# Patient Record
Sex: MALE | Race: WHITE | NOT HISPANIC OR LATINO | Employment: OTHER | URBAN - METROPOLITAN AREA
[De-identification: names, ages, dates, MRNs, and addresses within clinical notes are randomized per-mention and may not be internally consistent; named-entity substitution may affect disease eponyms.]

---

## 2017-01-03 DIAGNOSIS — I25.10 CORONARY ARTERY DISEASE, ANGINA PRESENCE UNSPECIFIED, UNSPECIFIED VESSEL OR LESION TYPE, UNSPECIFIED WHETHER NATIVE OR TRANSPLANTED HEART: Primary | ICD-10-CM

## 2017-01-03 RX ORDER — ISOSORBIDE MONONITRATE 120 MG/1
120 TABLET, EXTENDED RELEASE ORAL DAILY
Qty: 90 TABLET | Refills: 4 | Status: ON HOLD | OUTPATIENT
Start: 2017-01-03 | End: 2018-03-28

## 2017-01-07 ENCOUNTER — TELEPHONE (OUTPATIENT)
Dept: INTERNAL MEDICINE | Facility: CLINIC | Age: 77
End: 2017-01-07

## 2017-01-07 DIAGNOSIS — I10 ESSENTIAL HYPERTENSION: ICD-10-CM

## 2017-01-08 RX ORDER — METOPROLOL TARTRATE 50 MG/1
TABLET ORAL
Qty: 90 TABLET | Refills: 0 | Status: SHIPPED | OUTPATIENT
Start: 2017-01-08 | End: 2017-03-16 | Stop reason: SDUPTHER

## 2017-01-08 RX ORDER — GLIMEPIRIDE 1 MG/1
TABLET ORAL
Qty: 90 TABLET | Refills: 0 | Status: SHIPPED | OUTPATIENT
Start: 2017-01-08 | End: 2017-01-09 | Stop reason: SDUPTHER

## 2017-01-09 ENCOUNTER — TELEPHONE (OUTPATIENT)
Dept: INTERNAL MEDICINE | Facility: CLINIC | Age: 77
End: 2017-01-09

## 2017-01-09 RX ORDER — FOSINOPRIL SODIUM 40 MG/1
TABLET ORAL
Qty: 90 TABLET | Refills: 4 | Status: ON HOLD | OUTPATIENT
Start: 2017-01-09 | End: 2018-03-28 | Stop reason: HOSPADM

## 2017-01-09 RX ORDER — GLIMEPIRIDE 1 MG/1
1 TABLET ORAL EVERY MORNING
Qty: 90 TABLET | Refills: 0 | Status: SHIPPED | OUTPATIENT
Start: 2017-01-09 | End: 2017-01-12 | Stop reason: SDUPTHER

## 2017-01-09 NOTE — TELEPHONE ENCOUNTER
----- Message from Nelida Everett sent at 1/9/2017 12:47 PM CST -----  Contact: wife/suzanna/[phone]  Pt wife called in regards to getting a Rx refill sent to a local pharmacy for glimepiride (AMARYL) 1 MG tablet. Just until the pt receives the rest from [facility].        Please advise

## 2017-01-12 ENCOUNTER — ANTI-COAG VISIT (OUTPATIENT)
Dept: CARDIOLOGY | Facility: CLINIC | Age: 77
End: 2017-01-12
Payer: MEDICARE

## 2017-01-12 DIAGNOSIS — Z79.01 LONG TERM CURRENT USE OF ANTICOAGULANT THERAPY: ICD-10-CM

## 2017-01-12 DIAGNOSIS — Z95.2 H/O MECHANICAL AORTIC VALVE REPLACEMENT: ICD-10-CM

## 2017-01-12 LAB
CTP QC/QA: NORMAL
INR PPP: 2 (ref 2–3)

## 2017-01-12 PROCEDURE — 85610 PROTHROMBIN TIME: CPT | Mod: QW,S$GLB,,

## 2017-01-12 NOTE — TELEPHONE ENCOUNTER
----- Message from Gonzalez Fierro sent at 1/12/2017 10:40 AM CST -----  Mrs. Urbina called with concerns about Mr. Urbina's medication.

## 2017-01-13 RX ORDER — GLIMEPIRIDE 1 MG/1
1 TABLET ORAL EVERY MORNING
Qty: 90 TABLET | Refills: 2 | Status: SHIPPED | OUTPATIENT
Start: 2017-01-13 | End: 2017-12-29 | Stop reason: SDUPTHER

## 2017-02-02 ENCOUNTER — LAB VISIT (OUTPATIENT)
Dept: LAB | Facility: HOSPITAL | Age: 77
End: 2017-02-02
Attending: INTERNAL MEDICINE
Payer: MEDICARE

## 2017-02-02 DIAGNOSIS — E03.9 HYPOTHYROIDISM: ICD-10-CM

## 2017-02-02 DIAGNOSIS — E11.22 TYPE 2 DIABETES MELLITUS WITH DIABETIC CHRONIC KIDNEY DISEASE: ICD-10-CM

## 2017-02-02 DIAGNOSIS — E78.5 HYPERLIPIDEMIA: ICD-10-CM

## 2017-02-02 LAB
ALBUMIN SERPL BCP-MCNC: 3.5 G/DL
ALP SERPL-CCNC: 95 U/L
ALT SERPL W/O P-5'-P-CCNC: 19 U/L
ANION GAP SERPL CALC-SCNC: 6 MMOL/L
AST SERPL-CCNC: 27 U/L
BILIRUB SERPL-MCNC: 0.6 MG/DL
BUN SERPL-MCNC: 33 MG/DL
CALCIUM SERPL-MCNC: 10.1 MG/DL
CHLORIDE SERPL-SCNC: 109 MMOL/L
CHOLEST/HDLC SERPL: 3.7 {RATIO}
CO2 SERPL-SCNC: 25 MMOL/L
CREAT SERPL-MCNC: 1.4 MG/DL
EST. GFR  (AFRICAN AMERICAN): 56.4 ML/MIN/1.73 M^2
EST. GFR  (NON AFRICAN AMERICAN): 48.8 ML/MIN/1.73 M^2
GLUCOSE SERPL-MCNC: 93 MG/DL
HDL/CHOLESTEROL RATIO: 27 %
HDLC SERPL-MCNC: 126 MG/DL
HDLC SERPL-MCNC: 34 MG/DL
LDLC SERPL CALC-MCNC: 62.2 MG/DL
NONHDLC SERPL-MCNC: 92 MG/DL
POTASSIUM SERPL-SCNC: 5.6 MMOL/L
PROT SERPL-MCNC: 7.4 G/DL
SODIUM SERPL-SCNC: 140 MMOL/L
TRIGL SERPL-MCNC: 149 MG/DL
TSH SERPL DL<=0.005 MIU/L-ACNC: 3.03 UIU/ML

## 2017-02-02 PROCEDURE — 84443 ASSAY THYROID STIM HORMONE: CPT

## 2017-02-02 PROCEDURE — 36415 COLL VENOUS BLD VENIPUNCTURE: CPT

## 2017-02-02 PROCEDURE — 80053 COMPREHEN METABOLIC PANEL: CPT

## 2017-02-02 PROCEDURE — 80061 LIPID PANEL: CPT

## 2017-02-02 PROCEDURE — 83036 HEMOGLOBIN GLYCOSYLATED A1C: CPT

## 2017-02-03 LAB
ESTIMATED AVG GLUCOSE: 120 MG/DL
HBA1C MFR BLD HPLC: 5.8 %

## 2017-02-09 ENCOUNTER — OFFICE VISIT (OUTPATIENT)
Dept: INTERNAL MEDICINE | Facility: CLINIC | Age: 77
End: 2017-02-09
Payer: MEDICARE

## 2017-02-09 VITALS
DIASTOLIC BLOOD PRESSURE: 58 MMHG | HEIGHT: 66 IN | HEART RATE: 50 BPM | BODY MASS INDEX: 31 KG/M2 | SYSTOLIC BLOOD PRESSURE: 137 MMHG | WEIGHT: 192.88 LBS

## 2017-02-09 VITALS
RESPIRATION RATE: 16 BRPM | HEIGHT: 66 IN | SYSTOLIC BLOOD PRESSURE: 137 MMHG | WEIGHT: 192.88 LBS | HEART RATE: 50 BPM | BODY MASS INDEX: 31 KG/M2 | DIASTOLIC BLOOD PRESSURE: 58 MMHG

## 2017-02-09 DIAGNOSIS — R19.7 DIARRHEA, UNSPECIFIED TYPE: ICD-10-CM

## 2017-02-09 DIAGNOSIS — E11.69 OBESITY, DIABETES, AND HYPERTENSION SYNDROME: ICD-10-CM

## 2017-02-09 DIAGNOSIS — I15.2 OBESITY, DIABETES, AND HYPERTENSION SYNDROME: ICD-10-CM

## 2017-02-09 DIAGNOSIS — I51.89 LEFT VENTRICULAR DIASTOLIC DYSFUNCTION WITH PRESERVED SYSTOLIC FUNCTION: ICD-10-CM

## 2017-02-09 DIAGNOSIS — Z98.890 HISTORY OF AORTIC VALVE REPAIR: ICD-10-CM

## 2017-02-09 DIAGNOSIS — E66.9 OBESITY, DIABETES, AND HYPERTENSION SYNDROME: ICD-10-CM

## 2017-02-09 DIAGNOSIS — N25.81 HYPERPARATHYROIDISM DUE TO RENAL INSUFFICIENCY: ICD-10-CM

## 2017-02-09 DIAGNOSIS — I73.9 CLAUDICATION OF LEFT LOWER EXTREMITY: ICD-10-CM

## 2017-02-09 DIAGNOSIS — I73.9 PVD (PERIPHERAL VASCULAR DISEASE): ICD-10-CM

## 2017-02-09 DIAGNOSIS — Z95.2 H/O MECHANICAL AORTIC VALVE REPLACEMENT: ICD-10-CM

## 2017-02-09 DIAGNOSIS — L98.9 SKIN ABNORMALITIES: ICD-10-CM

## 2017-02-09 DIAGNOSIS — N18.30 HYPERTENSIVE KIDNEY DISEASE WITH CHRONIC KIDNEY DISEASE STAGE III: ICD-10-CM

## 2017-02-09 DIAGNOSIS — E11.51 TYPE 2 DIABETES MELLITUS WITH DIABETIC PERIPHERAL ANGIOPATHY WITHOUT GANGRENE, WITHOUT LONG-TERM CURRENT USE OF INSULIN: ICD-10-CM

## 2017-02-09 DIAGNOSIS — I71.9 AORTIC ANEURYSM WITHOUT RUPTURE, UNSPECIFIED PORTION OF AORTA: ICD-10-CM

## 2017-02-09 DIAGNOSIS — I10 ESSENTIAL HYPERTENSION: ICD-10-CM

## 2017-02-09 DIAGNOSIS — N18.30 ANEMIA OF CHRONIC RENAL FAILURE, STAGE 3 (MODERATE): ICD-10-CM

## 2017-02-09 DIAGNOSIS — Z79.01 LONG TERM CURRENT USE OF ANTICOAGULANT THERAPY: ICD-10-CM

## 2017-02-09 DIAGNOSIS — I72.3 ANEURYSM ARTERY, ILIAC: ICD-10-CM

## 2017-02-09 DIAGNOSIS — N18.30 TYPE 2 DIABETES MELLITUS WITH STAGE 3 CHRONIC KIDNEY DISEASE, WITHOUT LONG-TERM CURRENT USE OF INSULIN: ICD-10-CM

## 2017-02-09 DIAGNOSIS — Z87.39 HISTORY OF GOUT: ICD-10-CM

## 2017-02-09 DIAGNOSIS — I25.119 ATHEROSCLEROSIS OF NATIVE CORONARY ARTERY OF NATIVE HEART WITH ANGINA PECTORIS: ICD-10-CM

## 2017-02-09 DIAGNOSIS — I73.9 PVD (PERIPHERAL VASCULAR DISEASE): Primary | ICD-10-CM

## 2017-02-09 DIAGNOSIS — E11.59 OBESITY, DIABETES, AND HYPERTENSION SYNDROME: ICD-10-CM

## 2017-02-09 DIAGNOSIS — Z86.79 HISTORY OF AORTIC VALVE REPAIR: ICD-10-CM

## 2017-02-09 DIAGNOSIS — Z90.49 HISTORY OF COLON RESECTION: ICD-10-CM

## 2017-02-09 DIAGNOSIS — D63.1 ANEMIA OF CHRONIC RENAL FAILURE, STAGE 3 (MODERATE): ICD-10-CM

## 2017-02-09 DIAGNOSIS — E78.5 HYPERLIPIDEMIA, UNSPECIFIED HYPERLIPIDEMIA TYPE: ICD-10-CM

## 2017-02-09 DIAGNOSIS — I12.9 HYPERTENSIVE KIDNEY DISEASE WITH CHRONIC KIDNEY DISEASE STAGE III: ICD-10-CM

## 2017-02-09 DIAGNOSIS — N18.30 CHRONIC KIDNEY DISEASE, STAGE III (MODERATE): ICD-10-CM

## 2017-02-09 DIAGNOSIS — E11.22 TYPE 2 DIABETES MELLITUS WITH STAGE 3 CHRONIC KIDNEY DISEASE, WITHOUT LONG-TERM CURRENT USE OF INSULIN: ICD-10-CM

## 2017-02-09 DIAGNOSIS — I71.40 ABDOMINAL AORTIC ANEURYSM (AAA) WITHOUT RUPTURE: ICD-10-CM

## 2017-02-09 DIAGNOSIS — Z00.00 ENCOUNTER FOR PREVENTIVE HEALTH EXAMINATION: Primary | ICD-10-CM

## 2017-02-09 DIAGNOSIS — I20.9 ANGINA, CLASS III: ICD-10-CM

## 2017-02-09 DIAGNOSIS — I77.9 BILATERAL CAROTID ARTERY DISEASE: ICD-10-CM

## 2017-02-09 PROCEDURE — 99214 OFFICE O/P EST MOD 30 MIN: CPT | Mod: 25,S$GLB,, | Performed by: INTERNAL MEDICINE

## 2017-02-09 PROCEDURE — 3044F HG A1C LEVEL LT 7.0%: CPT | Mod: S$GLB,,, | Performed by: INTERNAL MEDICINE

## 2017-02-09 PROCEDURE — 3075F SYST BP GE 130 - 139MM HG: CPT | Mod: S$GLB,,, | Performed by: INTERNAL MEDICINE

## 2017-02-09 PROCEDURE — 99999 PR PBB SHADOW E&M-EST. PATIENT-LVL IV: CPT | Mod: PBBFAC,,, | Performed by: INTERNAL MEDICINE

## 2017-02-09 PROCEDURE — 1160F RVW MEDS BY RX/DR IN RCRD: CPT | Mod: S$GLB,,, | Performed by: INTERNAL MEDICINE

## 2017-02-09 PROCEDURE — 3066F NEPHROPATHY DOC TX: CPT | Mod: S$GLB,,, | Performed by: INTERNAL MEDICINE

## 2017-02-09 PROCEDURE — 99499 UNLISTED E&M SERVICE: CPT | Mod: S$GLB,,, | Performed by: INTERNAL MEDICINE

## 2017-02-09 PROCEDURE — 1157F ADVNC CARE PLAN IN RCRD: CPT | Mod: S$GLB,,, | Performed by: INTERNAL MEDICINE

## 2017-02-09 PROCEDURE — 99499 UNLISTED E&M SERVICE: CPT | Mod: S$GLB,,, | Performed by: NURSE PRACTITIONER

## 2017-02-09 PROCEDURE — 99999 PR PBB SHADOW E&M-EST. PATIENT-LVL IV: CPT | Mod: PBBFAC,,, | Performed by: NURSE PRACTITIONER

## 2017-02-09 PROCEDURE — 3078F DIAST BP <80 MM HG: CPT | Mod: S$GLB,,, | Performed by: INTERNAL MEDICINE

## 2017-02-09 PROCEDURE — G0439 PPPS, SUBSEQ VISIT: HCPCS | Mod: S$GLB,,, | Performed by: NURSE PRACTITIONER

## 2017-02-09 PROCEDURE — 90732 PPSV23 VACC 2 YRS+ SUBQ/IM: CPT | Mod: S$GLB,,, | Performed by: INTERNAL MEDICINE

## 2017-02-09 PROCEDURE — G0009 ADMIN PNEUMOCOCCAL VACCINE: HCPCS | Mod: S$GLB,,, | Performed by: INTERNAL MEDICINE

## 2017-02-09 PROCEDURE — 1159F MED LIST DOCD IN RCRD: CPT | Mod: S$GLB,,, | Performed by: INTERNAL MEDICINE

## 2017-02-09 NOTE — PROGRESS NOTES
"Dariel Urbina presented for a  Medicare AWV and comprehensive Health Risk Assessment today. The following components were reviewed and updated:    · Medical history  · Family History  · Social history  · Allergies and Current Medications  · Health Risk Assessment  · Health Maintenance  · Care Team     ** See Completed Assessments for Annual Wellness Visit within the encounter summary.**       The following assessments were completed:  · Living Situation  · CAGE  · Depression Screening  · Timed Get Up and Go  · Whisper Test  · Cognitive Function Screening  · Nutrition Screening  · ADL Screening  · PAQ Screening            Vitals:    02/09/17 1114   BP: (!) 137/58   Pulse: (!) 50   Weight: 87.5 kg (192 lb 14.4 oz)   Height: 5' 6" (1.676 m)     Body mass index is 31.14 kg/(m^2).  Physical Exam   Constitutional: He is oriented to person, place, and time. He appears well-developed and well-nourished. No distress.   HENT:   Head: Normocephalic and atraumatic.   Eyes: Conjunctivae are normal. No scleral icterus.   Neck: Normal range of motion. Neck supple.   Cardiovascular: Regular rhythm and normal heart sounds.  Bradycardia present.    Pulses:       Dorsalis pedis pulses are 1+ on the right side, and 1+ on the left side.   Mechanical click noted   Pulmonary/Chest: Effort normal and breath sounds normal. No respiratory distress.   Musculoskeletal: Normal range of motion. He exhibits no edema.   Neurological: He is alert and oriented to person, place, and time.   Skin: Skin is warm and dry. He is not diaphoretic.   Psychiatric: He has a normal mood and affect. His behavior is normal.         Diagnoses and health risks identified today and associated recommendations/orders:    1. Encounter for preventive health examination  Assessments completed  Preventative health recommendations reviewed    2. Aortic aneurysm without rupture, unspecified portion of aorta  Stable.Last seen on ultrasound from 05/23/16.  Abdominal aortic us " ordered by PCP and scheduled by patient  Followed by cardiology and PCP.     3. Aneurysm artery, iliac  Stable.Last seen on ultrasound from 05/23/16.  Abdominal aortic us ordered by PCP and scheduled by patient  Followed by cardiology and PCP.     4. Claudication of left lower extremity  Stable.   Controlled with current medical therapy  Followed by cardiology     5. Atherosclerosis of native coronary artery of native heart with angina pectoris  Stable.   Controlled with current medical therapy  Followed by cardiology      6. Angina, class III  Stable.   Controlled with current medical therapy  Followed by cardiology.     7. Type 2 diabetes mellitus with diabetic peripheral angiopathy without gangrene, without long-term current use of insulin  Stable. Last HgbA1C was 5.8  Controlled with current medical therapy  Followed by PCP.     8. PVD (peripheral vascular disease)  Stable.   Controlled with current medical therapy  Followed by cardiology.     9. Type 2 diabetes mellitus with stage 3 chronic kidney disease, without long-term current use of insulin  Stable.   Controlled with current medical therapy  Followed by nephrology and PCP.     10. Hypertensive kidney disease with chronic kidney disease stage III  Stable.   Controlled with current medical therapy  Followed by nephrology and PCP.     11. Hyperparathyroidism due to renal insufficiency  Stable.   Followed by nephrology    12. Anemia of chronic renal failure, stage 3 (moderate)  Stable.   Controlled with current medical therapy  Followed by nephrology.     13. H/O mechanical aortic valve replacement  Stable.   Followed by cardiology.     14. Essential hypertension  Stable.   Controlled with current medical therapy  Followed by cardiology and PCP.     15. Left ventricular diastolic dysfunction with preserved systolic function  Stable. Seen on ECHO from 05/18/16  Controlled with current medical therapy  Followed by cardiology.     16. Hyperlipidemia, unspecified  hyperlipidemia type  Stable.   Controlled with current medical therapy  Followed by cardiology and PCP.     17. Obesity, diabetes, and hypertension syndrome  Stable.   Discussed diet and exercise  Followed by PCP.     18. History of colon resection  Stable. Last colonoscopy was in 2010.  Discussed follow up.   Followed by PCP.     19. Bilateral carotid artery disease  Stable.  Seen on  from 03/11/15  Controlled with current medical therapy  Followed by cardiology and PCP      Provided aDriel with a 5-10 year written screening schedule and personal prevention plan. Recommendations were developed using the USPSTF age appropriate recommendations. Education, counseling, and referrals were provided as needed. After Visit Summary printed and given to patient which includes a list of additional screenings\tests needed.    Return in 4 months (on 6/5/2017) for routine visit with your primary care provider or sooner if problems arise. .    Joyce Ruano, NP

## 2017-02-09 NOTE — PROGRESS NOTES
Patient ID: Dariel Urbina is a 76 y.o. male.    Chief Complaint: Follow-up    HPI    74 yo M here for follow up appointment for multiple medical problems.      Diabetes mellitus type 2. Last A1c was 5.8 -- . He was put on amaryl 1mg last visit. His weight is down 12#.  . Weight today is 193 # lbs. Patient denies polyuria, polydipsia, visual disturbances.         Hyperlipidemia. On lipitor 40 mg , lovaza. And Fenofibrate --Recent lipid panel showed CHol 126, Hdl 34, LDL 62.2, and . He says he is taking all his cholesterol meds.         HTN. On monopril, lasix, imdur, lopressor. Pt does not measure BPs at home. BP today is 137/58. . There is some discussion about stopping the ACE due to hyperkalemia. K=5.6.  He saw nephrology in August.          CKD stage III. Pt saw Nephrology in 8/2016-- than note was reviewed. Cr back to baseline of 1.4.    BP has been well  controled at home .  Possible secondary hyperparathyroidism (- in July 2016) ). Hyperkalemic, counseled on low potassium diet.       H/o partial colon resection due to diverticulosis. He has been having diarrhea.  2 weeks- has gotten better  For last 2 days- watery worse right after eating.  NO N/V.  No recent travel.  No sick contacts.  NO recent antibiotics recently.      .    H/o CAD s/p CABG 2002, multiple stents, aortic valve replacement on coumadin. NO chest pain . He saw Dr. Vences In recently and that note was reviewed--  . He has LÓPEZ but this is chronic and some left sided claudication.      AAA- last us Showed 3.35 cm AAA- largest supra renal area.              H/o gout. On allopurinol. Last flare was 12 mo ago in left foot. He remains on Allopurinol.      Valvular heart disease- with mechanical AV, some MVR and some TVR-- NO He has jazzy eCP with walkin gout to mail box and his has discussed this with cards.  Reviewed recent PET stress test.   SOB with walking 50 feet- unchanged since seeing cards.         Review of Systems     Constitutional: Negative for fever, chills and unexpected weight change.    HENT: Negative for congestion, rhinorrhea and sinus pressure.    Eyes: Negative for visual disturbance.    Respiratory: He Has some SOB, but this is unchanged. He can mow the lawn with walk behind mower.    This has not changed since last visit.    Cardiovascular:as above.  .    Gastrointestinal: Negative for nausea, vomiting, abdominal pain, and constipation.  He does have jazzy e diarrhea-  For last 2 weeks - as above.    Genitourinary: Negative for dysuria, urgency and difficulty urinating.    Musculoskeletal: He reports his back- that was bothering him last vsiit- is not bothering him recently.    Skin: Negative.    Neurological: Negative for dizziness, syncope and headaches.    Hematological: Negative.    Psychiatric/Behavioral: Negative.    All other systems reviewed and are negative.    Objective:      Physical Exam   Constitutional: He is oriented to person, place, and time. He appears well-developed and well-nourished. No distress.    HENT:    Head: Normocephalic and atraumatic.    Mouth/Throat: Oropharynx is clear and moist. No oropharyngeal exudate.    Eyes: Conjunctivae and EOM are normal.    Neck: Normal range of motion. Neck supple. No JVD present. No tracheal deviation present.    Cardiovascular: Regular rhythm and normal heart sounds. Exam reveals no gallop and no friction rub.    Systolic murmur heard.  Pulse is 50  Pulmonary/Chest: Effort normal and breath sounds normal. No respiratory distress. He has no wheezes. He has no rales.    Abdominal: Soft. Bowel sounds are normal. He exhibits no distension. There is no tenderness. There is no rebound.   Musculoskeletal: Normal range of motion. He exhibits no edema and no tenderness.   Neurological: He is alert and oriented to person, place, and time.    Skin: Skin is warm and dry. No rash noted. He is not diaphoretic. No erythema.   Psychiatric: He has a normal mood and affect.  His behavior is normal.    Foot exam- Protective Sensation (w/ 10 gram monofilament):  Right: Decreased  Left: Decreased    Visual Inspection:  Normal -  Bilateral    Pedal Pulses:   Right: Diminshed  Left: Diminshed    Posterior tibialis:   Right:Present  Left: Present    .ffot Decrease sensation to Monofilament test on both feet more distally.          Assessment:      1.   DM (diabetes mellitus)     2.   Hyperlipidemia     3.   Chronic kidney disease, stage III (moderate)     4.   History of colon resection     5.   Hypertension     6.   CAD (coronary atherosclerotic disease)     7.    8.  9. History of aortic valve repair    AAA  [facility]- on coumadin    Plan:      1. DM type 2 for stable-- Hgb A1c controled.  Will refer back to his opthomologist. - follow up in 6 months   2. HLD. Lipid panel better since last visit. On lipitor, lovaza.  lipitor to 80 mg a day.   3. HTN. Stable   4. CKD, stage III. Seeing nephrology. Counseled on importance of BP control. Cont BP meds.    5. H/o partial colon resection. No complaints with N/V/D, constipation. c-scope due in 2020.    6. H/o CAD s/p CABG 2002, multiple stents, aortic valve replacement on coumadin. Cont coumadin and BP meds.  7. H/o gout.  Cont allopurinol.    8. hyperkalemia on low k diet--this is better-- recheck in 4 months   9. Will check stool OCP.  Wbc, and for C.diff.  Increase fluid intake,

## 2017-02-09 NOTE — MR AVS SNAPSHOT
[facility] Medicine  [address] [address]  Phone: [phone]  Fax: [phone]                  Dariel Urbina   2017 11:00 AM   Office Visit    Description:  Male : 1940   Provider:  S[facility] 2   Department:  [facility] - Internal Medicine           Reason for Visit     Health Risk Assessment           Diagnoses this Visit        Comments    Encounter for preventive health examination    -  Primary            To Do List           Future Appointments        Provider Department Dept Phone    2017 8:00 AM Marcie Lundy, PharmD [facility] - Coumadin [phone]    2017 9:00 AM VASCULAR, CARDIOLOGY [facility] - Vascular Cardiology [phone]    3/17/2017 4:40 PM Andrew Shaffer MD [facility] - Nephrology [phone]    3/27/2017 8:00 AM Priscilla Harrison MD [facility] - Dermatology [phone]    3/27/2017 9:30 AM Joey Vasquez OD [facility] - Optometry [phone]      Goals (5 Years of Data)     None      Follow-Up and Disposition     Return in 4 months (on 2017) for routine visit with your primary care provider or sooner if problems arise. .      [facility] On Call     [facility] On Call Nurse Care Line -  Assistance  Registered nurses in the [facility] On Call Center provide clinical advisement, health education, appointment booking, and other advisory services.  Call for this free service at [phone].             Medications           Message regarding Medications     Verify the changes and/or additions to your medication regime listed below are the same as discussed with your clinician today.  If any of these changes or additions are incorrect, please notify your healthcare provider.             Verify that the below list of medications is an accurate representation of the medications you are currently taking.  If none reported, the list may be blank. If incorrect, please contact your healthcare provider. Carry this list with you in case of  emergency.           Current Medications     allopurinol (ZYLOPRIM) 100 MG tablet TAKE 1 TABLET ONE TIME DAILY    atorvastatin (LIPITOR) 80 MG tablet     fosinopril (MONOPRIL) 40 MG tablet TAKE 1 TABLET ONE TIME DAILY    furosemide (LASIX) 20 MG tablet TAKE 1 TABLET ONE TIME DAILY    glimepiride (AMARYL) 1 MG tablet Take 1 tablet (1 mg total) by mouth every morning.    hydrALAZINE (APRESOLINE) 25 MG tablet Take 1 tablet (25 mg total) by mouth 3 (three) times daily.    isosorbide mononitrate (IMDUR) 120 MG 24 hr tablet Take 1 tablet (120 mg total) by mouth once daily.    metoprolol tartrate (LOPRESSOR) 50 MG tablet TAKE 1 TABLET EVERY DAY    nitroGLYCERIN (NITROSTAT) 0.4 MG SL tablet Place 1 tablet (0.4 mg total) under the tongue every 5 (five) minutes as needed. As needed for chest pain    omega-3 acid ethyl esters (LOVAZA) 1 gram capsule Take 2 capsules (2 g total) by mouth 2 (two) times daily.    warfarin (COUMADIN) 5 MG tablet Take 1 tablet (5 mg total) by mouth Daily. Current Dose (7.5 mg on Mon, Wed, Fri; 5 mg all other days)    fenofibrate micronized (LOFIBRA) 200 MG Cap Take 200 mg by mouth daily with breakfast.           Clinical Reference Information           Allergies as of 2/9/2017     No Known Allergies      Immunizations Administered on Date of Encounter - 2/9/2017     Name Date Dose VIS Date Route    Pneumococcal Polysaccharide - 23 Valent 2/9/2017 0.5 mL 4/24/2015 Intramuscular      [facility] Sign-Up     Activating your [facility] account is as easy as 1-2-3!     1) Visit [email or web address], select Sign Up Now, enter this activation code and your date of birth, then select Next.  [email or web address]  Expires: 3/26/2017 10:16 AM      2) Create a username and password to use when you visit [facility] in the future and select a security question in case you lose your password and select Next.    3) Enter your e-mail address and click Sign Up!    Additional Information  If you have questions, please e-mail  [email or web address] or call [phone] to talk to our [facility] staff. Remember, [facility] is NOT to be used for urgent needs. For medical emergencies, dial 911.         Instructions      Counseling and Referral of Other Preventative  (Italic type indicates deductible and co-insurance are waived)    Patient Name: Dariel Urbina  Today's Date: 2/9/2017      SERVICE LIMITATIONS RECOMMENDATION    Vaccines    · Pneumococcal (once after 65)    · Influenza (annually)    · Hepatitis B (if medium/high risk)    · Prevnar 13      Hepatitis B medium/high risk factors:       - End-stage renal disease       - Hemophiliacs who received Factor VII or         IX concentrates       - Clients of institutions for the mentally             retarded       - Persons who live in the same house as          a HepB carrier       - Homosexual men       - Illicit injectable drug abusers     Pneumococcal: Done, no repeat necessary     Influenza: Recommended, patient declined today.  Will get next flu season     Hepatitis B: N/A : defer to PCP     Prevnar 13: Done    Prostate cancer screening (annually to age 75)     Prostate specific antigen (PSA) Shared decision making with Provider. Sometimes a co-pay may be required if the patient decides to have this test. The USPSTF no longer recommends prostate cancer screening routinely in medicine: defer to PCP    Colorectal cancer screening (to age 75)    · Fecal occult blood test (annual)  · Flexible sigmoidoscopy (5y)  · Screening colonoscopy (10y)  · Barium enema   Last done 06/08/2010, recommend to repeat every 10  years    Diabetes self-management training (no USPSTF recommendations)  Requires referral by treating physician for patient with diabetes or renal disease. 10 hours of initial DSMT sessions of no less than 30 minutes each in a continuous 12-month period. 2 hours of follow-up DSMT in subsequent years.  Recommended, patient declined at this time    Glaucoma screening (no USPSTF  recommendation)  Diabetes mellitus, family history   , age 50 or over    American, age 65 or over  Last done 03/07/16, recommend to repeat every 1  year    Medical nutrition therapy for diabetes or renal disease (no recommended schedule)  Requires referral by treating physician for patient with diabetes or renal disease or kidney transplant within the past 3 years.  Can be provided in same year as diabetes self-management training (DSMT), and CMS recommends medical nutrition therapy take place after DSMT. Up to 3 hours for initial year and 2 hours in subsequent years.  N/A : n/a    Cardiovascular screening blood tests (every 5 years)  · Fasting lipid panel  Order as a panel if possible  Done this year, repeat every year    Diabetes screening tests (at least every 3 years, Medicare covers annually or at 6-month intervals for prediabetic patients)  · Fasting blood sugar (FBS) or glucose tolerance test (GTT)  Patient must be diagnosed with one of the following:       - Hypertension       - Dyslipidemia       - Obesity (BMI 30kg/m2)       - Previous elevated impaired FBS or GTT       ... or any two of the following:       - Overweight (BMI 25 but <30)       - Family history of diabetes       - Age 65 or older       - History of gestational diabetes or birth of baby weighing more than 9 pounds  Last done 02/02/17, recommend to repeat every 6  months    Abdominal aortic aneurysm screening (once)  · Sonogram   Limited to patients who meet one of the following criteria:       - Men who are 65-75 years old and have smoked more than 100 cigarette in their lifetime       - Anyone with a family history of abdominal aortic aneurysm       - Anyone recommended for screening by the USPSTF  Ordered by Dr Langston    HIV screening (annually for increased risk patients)  · HIV-1 and HIV-2 by EIA, or ILSA, rapid antibody test or oral mucosa transudate  Patients must be at increased risk for HIV infection per  USPSTF guidelines or pregnant. Tests covered annually for patient at increased risk or as requested by the patient. Pregnant patients may receive up to 3 tests during pregnancy.  Risks discussed, screening is not recommended    Smoking cessation counseling (up to 8 sessions per year)  Patients must be asymptomatic of tobacco-related conditions to receive as a preventative service.  n/a    Subsequent annual wellness visit  At least 12 months since last AWV  Return in one year     The following information is provided to all patients.  This information is to help you find resources for any of the problems found today that may be affecting your health:                Living healthy guide: www.[facility].[place]      Understanding Diabetes: www.diabetes.org      Eating healthy: www.cdc.gov/healthyweight      CDC home safety checklist: www.cdc.gov/steadi/patient.html      Agency on Aging: www.goea.[place].[facility]      Alcoholics anonymous (AA): www.aa.org      Physical Activity: www.ra.nih.gov/nv6ksrk      Tobacco use: www.quitwithusla.org          Language Assistance Services     ATTENTION: Language assistance services are available, free of charge. Please call [phone].      ATENCIÓN: Si habla español, tiene a guevara disposición servicios gratuitos de asistencia lingüística. Llame al [phone].     CHÚ Ý: N?u b?n nói Ti?ng Vi?t, có các d?ch v? h? tr? ngôn ng? mi?n phí dành cho b?n. G?i s? [phone].         [facility] complies with applicable Federal civil rights laws and does not discriminate on the basis of race, color, national origin, age, disability, or sex.

## 2017-02-09 NOTE — PATIENT INSTRUCTIONS
Counseling and Referral of Other Preventative  (Italic type indicates deductible and co-insurance are waived)    Patient Name: Dariel Urbina  Today's Date: 2/9/2017      SERVICE LIMITATIONS RECOMMENDATION    Vaccines    · Pneumococcal (once after 65)    · Influenza (annually)    · Hepatitis B (if medium/high risk)    · Prevnar 13      Hepatitis B medium/high risk factors:       - End-stage renal disease       - Hemophiliacs who received Factor VII or         IX concentrates       - Clients of institutions for the mentally             retarded       - Persons who live in the same house as          a HepB carrier       - Homosexual men       - Illicit injectable drug abusers     Pneumococcal: Done, no repeat necessary     Influenza: Recommended, patient declined today.  Will get next flu season     Hepatitis B: N/A : defer to PCP     Prevnar 13: Done    Prostate cancer screening (annually to age 75)     Prostate specific antigen (PSA) Shared decision making with Provider. Sometimes a co-pay may be required if the patient decides to have this test. The USPSTF no longer recommends prostate cancer screening routinely in medicine: defer to PCP    Colorectal cancer screening (to age 75)    · Fecal occult blood test (annual)  · Flexible sigmoidoscopy (5y)  · Screening colonoscopy (10y)  · Barium enema   Last done 06/08/2010, recommend to repeat every 10  years    Diabetes self-management training (no USPSTF recommendations)  Requires referral by treating physician for patient with diabetes or renal disease. 10 hours of initial DSMT sessions of no less than 30 minutes each in a continuous 12-month period. 2 hours of follow-up DSMT in subsequent years.  Recommended, patient declined at this time    Glaucoma screening (no USPSTF recommendation)  Diabetes mellitus, family history   , age 50 or over    American, age 65 or over  Last done 03/07/16, recommend to repeat every 1  year    Medical nutrition  therapy for diabetes or renal disease (no recommended schedule)  Requires referral by treating physician for patient with diabetes or renal disease or kidney transplant within the past 3 years.  Can be provided in same year as diabetes self-management training (DSMT), and CMS recommends medical nutrition therapy take place after DSMT. Up to 3 hours for initial year and 2 hours in subsequent years.  N/A : n/a    Cardiovascular screening blood tests (every 5 years)  · Fasting lipid panel  Order as a panel if possible  Done this year, repeat every year    Diabetes screening tests (at least every 3 years, Medicare covers annually or at 6-month intervals for prediabetic patients)  · Fasting blood sugar (FBS) or glucose tolerance test (GTT)  Patient must be diagnosed with one of the following:       - Hypertension       - Dyslipidemia       - Obesity (BMI 30kg/m2)       - Previous elevated impaired FBS or GTT       ... or any two of the following:       - Overweight (BMI 25 but <30)       - Family history of diabetes       - Age 65 or older       - History of gestational diabetes or birth of baby weighing more than 9 pounds  Last done 02/02/17, recommend to repeat every 6  months    Abdominal aortic aneurysm screening (once)  · Sonogram   Limited to patients who meet one of the following criteria:       - Men who are 65-75 years old and have smoked more than 100 cigarette in their lifetime       - Anyone with a family history of abdominal aortic aneurysm       - Anyone recommended for screening by the USPSTF  Ordered by Dr Langston    HIV screening (annually for increased risk patients)  · HIV-1 and HIV-2 by EIA, or ILSA, rapid antibody test or oral mucosa transudate  Patients must be at increased risk for HIV infection per USPSTF guidelines or pregnant. Tests covered annually for patient at increased risk or as requested by the patient. Pregnant patients may receive up to 3 tests during pregnancy.  Risks discussed,  screening is not recommended    Smoking cessation counseling (up to 8 sessions per year)  Patients must be asymptomatic of tobacco-related conditions to receive as a preventative service.  n/a    Subsequent annual wellness visit  At least 12 months since last AWV  Return in one year     The following information is provided to all patients.  This information is to help you find resources for any of the problems found today that may be affecting your health:                Living healthy guide: www.[facility].[place].[facility]      Understanding Diabetes: www.diabetes.org      Eating healthy: www.cdc.gov/healthyweight      [facility] home safety checklist: www.cdc.gov/steadi/patient.html      Agency on Aging: www.goea.[place].[facility]      Alcoholics anonymous (AA): www.aa.org      Physical Activity: www.ra.nih.gov/bp2nhto      Tobacco use: www.quitwithusla.org

## 2017-02-09 NOTE — MR AVS SNAPSHOT
[facility]  [address]  [facility] [address]  Phone: [phone]  Fax: [phone]                  Dariel Osorio Ciara   2017 9:00 AM   Office Visit    Description:  Male : 1940   Provider:  Devon Langston Jr., MD   Department:  [facility]           Reason for Visit     Follow-up     Diarrhea           Diagnoses this Visit        Comments    PVD (peripheral vascular disease)    -  Primary     Atherosclerosis of native coronary artery of native heart with angina pectoris         Long term current use of anticoagulant therapy         Type 2 diabetes mellitus with stage 3 chronic kidney disease, without long-term current use of insulin         Hyperlipidemia, unspecified hyperlipidemia type         Chronic kidney disease, stage III (moderate)         History of colon resection         Essential hypertension         History of aortic valve repair         History of gout         H/O mechanical aortic valve replacement         Obesity, diabetes, and hypertension syndrome         Hyperparathyroidism due to renal insufficiency         Type 2 diabetes mellitus with diabetic peripheral angiopathy without gangrene, without long-term current use of insulin         Diarrhea, unspecified type         Abdominal aortic aneurysm (AAA) without rupture         Skin abnormalities                To Do List           Future Appointments        Provider Department Dept Phone    2017 11:00 AM [facility], NOM 2 [facility] [phone]    2017 8:00 AM Marcie Lundy, PharmD [facility] - Coumadin [phone]    3/17/2017 4:40 PM MD [facility] Nephrology [phone]      Goals (5 Years of Data)     None      [facility] On [facility]     [facility] On Call Nurse Care Line -  Assistance  Registered nurses in the [facility] On Call Center provide clinical advisement, health education, appointment booking, and other advisory services.  Call for this free  "service at [phone].             Medications           Message regarding Medications     Verify the changes and/or additions to your medication regime listed below are the same as discussed with your clinician today.  If any of these changes or additions are incorrect, please notify your healthcare provider.             Verify that the below list of medications is an accurate representation of the medications you are currently taking.  If none reported, the list may be blank. If incorrect, please contact your healthcare provider. Carry this list with you in case of emergency.           Current Medications     allopurinol (ZYLOPRIM) 100 MG tablet TAKE 1 TABLET ONE TIME DAILY    atorvastatin (LIPITOR) 80 MG tablet     fosinopril (MONOPRIL) 40 MG tablet TAKE 1 TABLET ONE TIME DAILY    furosemide (LASIX) 20 MG tablet TAKE 1 TABLET ONE TIME DAILY    glimepiride (AMARYL) 1 MG tablet Take 1 tablet (1 mg total) by mouth every morning.    hydrALAZINE (APRESOLINE) 25 MG tablet Take 1 tablet (25 mg total) by mouth 3 (three) times daily.    isosorbide mononitrate (IMDUR) 120 MG 24 hr tablet Take 1 tablet (120 mg total) by mouth once daily.    metoprolol tartrate (LOPRESSOR) 50 MG tablet TAKE 1 TABLET EVERY DAY    nitroGLYCERIN (NITROSTAT) 0.4 MG SL tablet Place 1 tablet (0.4 mg total) under the tongue every 5 (five) minutes as needed. As needed for chest pain    omega-3 acid ethyl esters (LOVAZA) 1 gram capsule Take 2 capsules (2 g total) by mouth 2 (two) times daily.    warfarin (COUMADIN) 5 MG tablet Take 1 tablet (5 mg total) by mouth Daily. Current Dose (7.5 mg on Mon, Wed, Fri; 5 mg all other days)    fenofibrate micronized (LOFIBRA) 200 MG Cap Take 200 mg by mouth daily with breakfast.           Clinical Reference Information           Your Vitals Were     BP Pulse Resp Height Weight BMI    137/58 (BP Location: Left leg, Patient Position: Sitting) 50 16 5' 6" (1.676 m) 87.5 kg (192 lb 14.4 oz) 31.14 kg/m2      Blood " Pressure          Most Recent Value    BP  (!)  137/58      Allergies as of 2/9/2017     No Known Allergies      Immunizations Administered on Date of Encounter - 2/9/2017     Name Date Dose VIS Date Route    Pneumococcal Polysaccharide - 23 Valent 2/9/2017 0.5 mL 4/24/2015 Intramuscular      Orders Placed During Today's Visit      Normal Orders This Visit    Ambulatory consult to Dermatology     Ambulatory consult to Optometry     Pneumococcal Polysaccharide Vaccine (23 Valent) (SQ/IM)     Future Labs/Procedures Expected by Expires    Comprehensive metabolic panel  2/9/2017 4/10/2018    Hemoglobin A1c  2/9/2017 4/10/2018    Stool culture  2/9/2017 2/9/2018    Stool Exam-Ova,Cysts,Parasites  2/9/2017 2/9/2018    WBC, Stool  2/9/2017 2/9/2018    Vascular Lab ()  AAA Screening  As directed 2/9/2018      [facility] Sign-Up     Activating your [facility] account is as easy as 1-2-3!     1) Visit [email or web address], select Sign Up Now, enter this activation code and your date of birth, then select Next.  [email or web address]  Expires: 3/26/2017 10:16 AM      2) Create a username and password to use when you visit [facility] in the future and select a security question in case you lose your password and select Next.    3) Enter your e-mail address and click Sign Up!    Additional Information  If you have questions, please e-mail [email or web address] or call [phone] to talk to our [facility] staff. Remember, [facility] is NOT to be used for urgent needs. For medical emergencies, dial 911.         Language Assistance Services     ATTENTION: Language assistance services are available, free of charge. Please call [phone].      ATENCIÓN: Si habla español, tiene a guevara disposición servicios gratuitos de asistencia lingüística. Llame al [phone].     CHÚ Ý: N?u b?n nói Ti?ng Vi?t, có các d?ch v? h? tr? ngôn ng? mi?n phí dành cho b?n. G?i s? [phone].         [facility] complies with applicable  Federal civil rights laws and does not discriminate on the basis of race, color, national origin, age, disability, or sex.

## 2017-02-13 ENCOUNTER — LAB VISIT (OUTPATIENT)
Dept: LAB | Facility: HOSPITAL | Age: 77
End: 2017-02-13
Attending: INTERNAL MEDICINE
Payer: MEDICARE

## 2017-02-13 DIAGNOSIS — R19.7 DIARRHEA, UNSPECIFIED TYPE: ICD-10-CM

## 2017-02-13 PROCEDURE — 87046 STOOL CULTR AEROBIC BACT EA: CPT | Mod: 59

## 2017-02-13 PROCEDURE — 89055 LEUKOCYTE ASSESSMENT FECAL: CPT

## 2017-02-13 PROCEDURE — 87209 SMEAR COMPLEX STAIN: CPT

## 2017-02-13 PROCEDURE — 87045 FECES CULTURE AEROBIC BACT: CPT

## 2017-02-13 PROCEDURE — 87427 SHIGA-LIKE TOXIN AG IA: CPT | Mod: 59

## 2017-02-14 LAB
O+P STL TRI STN: NORMAL
WBC #/AREA STL HPF: NORMAL /[HPF]

## 2017-02-15 LAB
E COLI SXT1 STL QL IA: NEGATIVE
E COLI SXT2 STL QL IA: NEGATIVE

## 2017-02-17 ENCOUNTER — TELEPHONE (OUTPATIENT)
Dept: INTERNAL MEDICINE | Facility: CLINIC | Age: 77
End: 2017-02-17

## 2017-02-17 DIAGNOSIS — R19.7 DIARRHEA, UNSPECIFIED TYPE: Primary | ICD-10-CM

## 2017-02-17 LAB — BACTERIA STL CULT: NORMAL

## 2017-02-20 NOTE — TELEPHONE ENCOUNTER
Spoke to pt and advised. Pt stated that it stopped for 2 days but resumed this morning. Pt denied abdominal pain or visible blood. Pt did say that he is unable to sleep at night. Please advise.

## 2017-02-21 ENCOUNTER — OFFICE VISIT (OUTPATIENT)
Dept: GASTROENTEROLOGY | Facility: CLINIC | Age: 77
End: 2017-02-21
Payer: MEDICARE

## 2017-02-21 VITALS
HEART RATE: 49 BPM | TEMPERATURE: 98 F | WEIGHT: 192.25 LBS | HEIGHT: 66 IN | SYSTOLIC BLOOD PRESSURE: 144 MMHG | BODY MASS INDEX: 30.9 KG/M2 | DIASTOLIC BLOOD PRESSURE: 59 MMHG

## 2017-02-21 DIAGNOSIS — R19.7 DIARRHEA OF PRESUMED INFECTIOUS ORIGIN: Primary | ICD-10-CM

## 2017-02-21 PROCEDURE — 99203 OFFICE O/P NEW LOW 30 MIN: CPT | Mod: S$GLB,,, | Performed by: PHYSICIAN ASSISTANT

## 2017-02-21 PROCEDURE — 3078F DIAST BP <80 MM HG: CPT | Mod: S$GLB,,, | Performed by: PHYSICIAN ASSISTANT

## 2017-02-21 PROCEDURE — 3074F SYST BP LT 130 MM HG: CPT | Mod: S$GLB,,, | Performed by: PHYSICIAN ASSISTANT

## 2017-02-21 PROCEDURE — 1125F AMNT PAIN NOTED PAIN PRSNT: CPT | Mod: S$GLB,,, | Performed by: PHYSICIAN ASSISTANT

## 2017-02-21 PROCEDURE — 1160F RVW MEDS BY RX/DR IN RCRD: CPT | Mod: S$GLB,,, | Performed by: PHYSICIAN ASSISTANT

## 2017-02-21 PROCEDURE — 1159F MED LIST DOCD IN RCRD: CPT | Mod: S$GLB,,, | Performed by: PHYSICIAN ASSISTANT

## 2017-02-21 PROCEDURE — 99999 PR PBB SHADOW E&M-EST. PATIENT-LVL V: CPT | Mod: PBBFAC,,, | Performed by: PHYSICIAN ASSISTANT

## 2017-02-21 PROCEDURE — 1157F ADVNC CARE PLAN IN RCRD: CPT | Mod: S$GLB,,, | Performed by: PHYSICIAN ASSISTANT

## 2017-02-21 RX ORDER — DIPHENOXYLATE HYDROCHLORIDE AND ATROPINE SULFATE 2.5; .025 MG/1; MG/1
1 TABLET ORAL 4 TIMES DAILY PRN
Qty: 120 TABLET | Refills: 0 | Status: SHIPPED | OUTPATIENT
Start: 2017-02-21 | End: 2017-03-17

## 2017-02-21 NOTE — PATIENT INSTRUCTIONS
Start the probiotic called culturelle    Avoid mild, cheese and butter    Avoid artificial sweeteners    Do the stool sample prior to starting the lomotil

## 2017-02-21 NOTE — PROGRESS NOTES
[facility] Consultation Note    Reason for Consult:  The encounter diagnosis was Diarrhea of presumed infectious origin.    PCP: Devon Langston   [address] / [place] [address]  REF MD: Devon Langston Jr., MD  [address] [place] [address]    HPI:  This is a 76 y.o. male here for evaluation of diarrhea.  Here today with his wife.  His wifer reports that he had 18 inches of the colon removeed    Bowel movements a day - 7-8 BM  Duration - 2 months, worse in the past 3 weeks  Antibiotics/Travel/Well Water - no  Blood/Mucus/Pus/Oily - no  Nocturnal stools - yes  Fasting - no change  +Post prandial  No lactose intolerance  No abdominal pain  Only new med is hydralazine   No antibiotics  Uses sweet n low - stopped about once a week  Minimal relief taking imodium  Stool studies - neg, although C. Diff was not ordered    Chest pain at night with lying down. Associated with tachycardia. Seen by cardio. Has stress test that revealed small sized mild ischemia in the distal inferolateral wall in the usual distribution of the distal Left Circumflex Artery. This defect comprises 10 % of the left ventricular myocardium. Dr. Vences had not make note of further workup at this time. No pyrosis. No regugitation    6/2010 colonoscopy was normal. Bx for microscopic colitis was normal.     + weight loss due to the not eating as much = 13lbs in the past 4 months    ROS:  Constitutional: No fevers, chills, + weight loss  ENT: No allergies  CV: +chest pain  Pulm: No cough, + shortness of breath  Ophtho: No vision changes  GI: see HPI  Derm: No rash  Heme: No lymphadenopathy, No bruising  MSK: No arthritis  : No dysuria, No hematuria  Endo: No hot or cold intolerance  Neuro: No syncope, No seizure  Psych: No anxiety, No depression    Medical History:  has a past medical history of Bilateral carotid artery disease (2/9/2017); Bleeding; CAD (coronary atherosclerotic disease) (9/11/2012);  Cataract; Chronic kidney disease; DM (diabetes mellitus) (9/26/2012); History of gout (9/26/2012); Hyperlipidemia; Hypertension; Renal insufficiency (9/11/2012); and Type II or unspecified type diabetes mellitus without mention of complication, not stated as uncontrolled.    Surgical History:  has a past surgical history that includes Cardiac valuve replacement; Cardiac catheterization; Coronary artery bypass graft; Coronary angioplasty; Cardiac valve replacement; Spine surgery; Tonsillectomy; Adenoidectomy; Vasectomy; and Colon surgery.    Family History: family history includes Alcohol abuse in his father; Diabetes in his brother; Heart disease in his brother, father, mother, and sister; Heart failure in his brother, father, and mother; No Known Problems in his maternal aunt, maternal grandfather, maternal grandmother, maternal uncle, paternal aunt, paternal grandfather, paternal grandmother, paternal uncle, and sister. There is no history of Amblyopia, Blindness, Cancer, Cataracts, Glaucoma, Hypertension, Macular degeneration, Retinal detachment, Strabismus, Stroke, Thyroid disease, Anemia, Arrhythmia, Asthma, Clotting disorder, Fainting, Heart attack, Hyperlipidemia, or Atrial Septal Defect..     Social History:  reports that he quit smoking about 36 years ago. He has a 20.00 pack-year smoking history. He has never used smokeless tobacco. He reports that he does not drink alcohol or use illicit drugs.    Review of patient's allergies indicates:  No Known Allergies    Current Outpatient Prescriptions   Medication Sig    allopurinol (ZYLOPRIM) 100 MG tablet TAKE 1 TABLET ONE TIME DAILY    atorvastatin (LIPITOR) 80 MG tablet     fenofibrate micronized (LOFIBRA) 200 MG Cap Take 200 mg by mouth daily with breakfast.    fosinopril (MONOPRIL) 40 MG tablet TAKE 1 TABLET ONE TIME DAILY    furosemide (LASIX) 20 MG tablet TAKE 1 TABLET ONE TIME DAILY    glimepiride (AMARYL) 1 MG tablet Take 1 tablet (1 mg total) by  "mouth every morning.    hydrALAZINE (APRESOLINE) 25 MG tablet Take 1 tablet (25 mg total) by mouth 3 (three) times daily.    isosorbide mononitrate (IMDUR) 120 MG 24 hr tablet Take 1 tablet (120 mg total) by mouth once daily.    metoprolol tartrate (LOPRESSOR) 50 MG tablet TAKE 1 TABLET EVERY DAY    nitroGLYCERIN (NITROSTAT) 0.4 MG SL tablet Place 1 tablet (0.4 mg total) under the tongue every 5 (five) minutes as needed. As needed for chest pain    omega-3 acid ethyl esters (LOVAZA) 1 gram capsule Take 2 capsules (2 g total) by mouth 2 (two) times daily.    warfarin (COUMADIN) 5 MG tablet Take 1 tablet (5 mg total) by mouth Daily. Current Dose (7.5 mg on Mon, Wed, Fri; 5 mg all other days)    diphenoxylate-atropine 2.5-0.025 mg (LOMOTIL) 2.5-0.025 mg per tablet Take 1 tablet by mouth 4 (four) times daily as needed for Diarrhea.     No current facility-administered medications for this visit.          Objective Findings:    Vital Signs:  Visit Vitals    BP (!) 144/59 (BP Location: Left arm, Patient Position: Sitting)    Pulse (!) 49    Temp 97.8 °F (36.6 °C)    Ht 5' 6" (1.676 m)    Wt 87.2 kg (192 lb 3.9 oz)    BMI 31.03 kg/m2     Body mass index is 31.03 kg/(m^2).    Physical Exam:  General Appearance: Well appearing in no acute distress  Head:   Normocephalic, without obvious abnormality  Eyes:    No scleral icterus  ENT: Neck supple, Lips, mucosa, and tongue normal  Lungs: CTA bilaterally in anterior and posterior fields, no wheezes, no crackles.  Heart:  Regular rate and rhythm, S1, S2 normal, no murmurs heard  Abdomen: Soft, non tender, non distended with positive bowel sounds in all four quadrants.   Extremities: 2+ pulses, no  edema  Skin: No rash  Neurologic: AAO x 3      Labs:  Lab Results   Component Value Date    WBC 5.72 07/29/2016    HGB 12.4 (L) 07/29/2016    HCT 37.9 (L) 07/29/2016     07/29/2016    CHOL 126 02/02/2017    TRIG 149 02/02/2017    HDL 34 (L) 02/02/2017    ALT 19 " 02/02/2017    AST 27 02/02/2017     02/02/2017    K 5.6 (H) 02/02/2017     02/02/2017    CREATININE 1.4 02/02/2017    BUN 33 (H) 02/02/2017    CO2 25 02/02/2017    TSH 3.029 02/02/2017    PSA 0.35 07/27/2012    INR 2.0 01/12/2017    HGBA1C 5.8 02/02/2017         Endoscopy:    6/2010 colonoscopy was normal. Bx for microscopic colitis was normal.  Assessment:  1. Diarrhea of presumed infectious origin      Diarrhea x 2 months. Postprandial and nocturnal. Hydralazine was started around this time.     Recommendations:  1. Rule out C. Diff with stool studies  2. lomotil prn for diarrhea  3.Start the probiotic called culturelle  4.Avoid mild, cheese and butter  5.Avoid artificial sweeteners    If negative repeat colonoscopy  No Follow-up on file.      Order summary:  Orders Placed This Encounter    Clostridium difficile EIA    diphenoxylate-atropine 2.5-0.025 mg (LOMOTIL) 2.5-0.025 mg per tablet         Thank you so much for allowing me to participate in the care of Dariel Laws MD

## 2017-02-21 NOTE — LETTER
February 22, 2017      Devon Langston Jr., MD  [address]  [place] [address]           [facility] - Gastroenterology  [address]  [place] [address]  Phone: [phone]  Fax: [phone]          Patient: Dariel Urbina   MR Number: [id number]   YOB: 1940   Date of Visit: 2/21/2017       Dear Dr. Devon Langston Jr.:    Thank you for referring Dariel Urbina to me for evaluation. Attached you will find relevant portions of my assessment and plan of care.    If you have questions, please do not hesitate to call me. I look forward to following Dariel Urbina along with you.    Sincerely,    Cristi Laws PA-C    Enclosure  CC:  No Recipients    If you would like to receive this communication electronically, please contact [email or web address] or [phone] to request more information on [employer] Link access.    For providers and/or their staff who would like to refer a patient to [facility], please contact us through our one-stop-shop provider referral line, [facility], at [phone].    If you feel you have received this communication in error or would no longer like to receive these types of communications, please e-mail [email or web address]

## 2017-02-23 ENCOUNTER — CLINICAL SUPPORT (OUTPATIENT)
Dept: CARDIOLOGY | Facility: CLINIC | Age: 77
End: 2017-02-23
Attending: INTERNAL MEDICINE
Payer: MEDICARE

## 2017-02-23 ENCOUNTER — ANTI-COAG VISIT (OUTPATIENT)
Dept: CARDIOLOGY | Facility: CLINIC | Age: 77
End: 2017-02-23
Payer: MEDICARE

## 2017-02-23 DIAGNOSIS — Z95.2 H/O MECHANICAL AORTIC VALVE REPLACEMENT: ICD-10-CM

## 2017-02-23 DIAGNOSIS — Z79.01 LONG TERM CURRENT USE OF ANTICOAGULANT THERAPY: Primary | ICD-10-CM

## 2017-02-23 DIAGNOSIS — I71.40 ABDOMINAL AORTIC ANEURYSM (AAA) WITHOUT RUPTURE: ICD-10-CM

## 2017-02-23 LAB
INR PPP: 2.2 (ref 2–3)
VASCULAR ABDOMINAL AORTIC ANEURYSM (AAA): 3.35

## 2017-02-23 PROCEDURE — 93978 VASCULAR STUDY: CPT | Mod: S$GLB,,, | Performed by: INTERNAL MEDICINE

## 2017-02-23 PROCEDURE — 99999 PR PBB SHADOW E&M-EST. PATIENT-LVL I: CPT | Mod: PBBFAC,,,

## 2017-02-23 PROCEDURE — 85610 PROTHROMBIN TIME: CPT | Mod: QW,S$GLB,,

## 2017-02-23 PROCEDURE — 99211 OFF/OP EST MAY X REQ PHY/QHP: CPT | Mod: 25,S$GLB,,

## 2017-02-23 NOTE — PROGRESS NOTES
The patient reports today with an INR within therapeutic range.  He denies any missed doses, problems with medications, or changes in diet.  He stated that he is starting to feel a burning sensation in his chest that can keep him up at night.  Upon further questioning, it seems like it might be related to GERD and he was advised to try ranitidine or famotidine in the mean time since he states that pain is in relation to when he eats.  He report that he has had less nose-bleeds recently.  He will f/u in 7 weeks timed to his next cardiology appointment.  The patient will continue the warfarin regimen without any changes.  The patient understands to contact us if there are any new changes with medications, health, or dietary habits.

## 2017-02-27 ENCOUNTER — TELEPHONE (OUTPATIENT)
Dept: INTERNAL MEDICINE | Facility: CLINIC | Age: 77
End: 2017-02-27

## 2017-03-08 ENCOUNTER — LAB VISIT (OUTPATIENT)
Dept: LAB | Facility: HOSPITAL | Age: 77
End: 2017-03-08
Attending: INTERNAL MEDICINE
Payer: MEDICARE

## 2017-03-08 DIAGNOSIS — R19.7 DIARRHEA OF PRESUMED INFECTIOUS ORIGIN: ICD-10-CM

## 2017-03-08 LAB
C DIFF GDH STL QL: NEGATIVE
C DIFF TOX A+B STL QL IA: NEGATIVE

## 2017-03-08 PROCEDURE — 87449 NOS EACH ORGANISM AG IA: CPT

## 2017-03-10 ENCOUNTER — TELEPHONE (OUTPATIENT)
Dept: ENDOSCOPY | Facility: HOSPITAL | Age: 77
End: 2017-03-10

## 2017-03-10 ENCOUNTER — TELEPHONE (OUTPATIENT)
Dept: GASTROENTEROLOGY | Facility: CLINIC | Age: 77
End: 2017-03-10

## 2017-03-10 DIAGNOSIS — R63.4 WEIGHT LOSS: ICD-10-CM

## 2017-03-10 DIAGNOSIS — Z12.11 COLON CANCER SCREENING: Primary | ICD-10-CM

## 2017-03-10 DIAGNOSIS — K59.1 FUNCTIONAL DIARRHEA: ICD-10-CM

## 2017-03-10 DIAGNOSIS — Z79.01 ANTICOAGULATED ON COUMADIN: Primary | ICD-10-CM

## 2017-03-10 DIAGNOSIS — Z12.11 SPECIAL SCREENING FOR MALIGNANT NEOPLASMS, COLON: Primary | ICD-10-CM

## 2017-03-10 RX ORDER — POLYETHYLENE GLYCOL 3350, SODIUM SULFATE ANHYDROUS, SODIUM BICARBONATE, SODIUM CHLORIDE, POTASSIUM CHLORIDE 236; 22.74; 6.74; 5.86; 2.97 G/4L; G/4L; G/4L; G/4L; G/4L
4 POWDER, FOR SOLUTION ORAL ONCE
Qty: 4000 ML | Refills: 0 | Status: SHIPPED | OUTPATIENT
Start: 2017-03-10 | End: 2017-03-10

## 2017-03-10 NOTE — PROGRESS NOTES
The pt is scheduled for a c-scope on 3/31/17 and will need to hold coumadin ~3 days prior.  He has a hx of a [facility] aortic valve and will require a lovenox bridge while holding his coumadin.  We will see him for an INR roughly 1 week prior to this procedure and provide lovenox instructions at that time.

## 2017-03-10 NOTE — TELEPHONE ENCOUNTER
Spoke with pt ,test results given pt verbalized understanding , pt was also given the number to the endo schedulers

## 2017-03-10 NOTE — TELEPHONE ENCOUNTER
Patient is scheduled for Colonoscopy 3/31/2017 with Dr. Raymond.  Instructions sent via mail.  Prep used: PEG.  Labs prior.  Called Coumadin Clinic, spoke to Sonja and informed her of the date and time of the procedure.  Requested that the Coumadin Clinic contact the patient and instruct on Coumadin hold time.

## 2017-03-16 RX ORDER — METOPROLOL TARTRATE 50 MG/1
50 TABLET ORAL DAILY
Qty: 90 TABLET | Refills: 3 | Status: ON HOLD | OUTPATIENT
Start: 2017-03-16 | End: 2018-03-28 | Stop reason: HOSPADM

## 2017-03-16 NOTE — TELEPHONE ENCOUNTER
----- Message from Shweta Armstrong sent at 3/15/2017  1:30 PM CDT -----  Contact: Ameena wife- at [phone]  RX request - refill or new RX.  Is this a refill or new RX:  New RX  RX name and strength: metoprolol tartrate (LOPRESSOR) 50 MG tablet  Directions:   Is this a 30 day or 90 day RX:  90 day  Pharmacy name and phone #: [facility]  [phone] (Phone)  [phone] (Fax)  Comments:

## 2017-03-17 ENCOUNTER — OFFICE VISIT (OUTPATIENT)
Dept: NEPHROLOGY | Facility: CLINIC | Age: 77
End: 2017-03-17
Payer: MEDICARE

## 2017-03-17 VITALS
BODY MASS INDEX: 31.57 KG/M2 | SYSTOLIC BLOOD PRESSURE: 144 MMHG | HEART RATE: 61 BPM | OXYGEN SATURATION: 97 % | HEIGHT: 66 IN | DIASTOLIC BLOOD PRESSURE: 60 MMHG | WEIGHT: 196.44 LBS

## 2017-03-17 DIAGNOSIS — E11.22 CONTROLLED TYPE 2 DIABETES MELLITUS WITH STAGE 3 CHRONIC KIDNEY DISEASE, WITHOUT LONG-TERM CURRENT USE OF INSULIN: Primary | ICD-10-CM

## 2017-03-17 DIAGNOSIS — N18.30 CONTROLLED TYPE 2 DIABETES MELLITUS WITH STAGE 3 CHRONIC KIDNEY DISEASE, WITHOUT LONG-TERM CURRENT USE OF INSULIN: Primary | ICD-10-CM

## 2017-03-17 DIAGNOSIS — E87.5 HYPERKALEMIA: ICD-10-CM

## 2017-03-17 PROCEDURE — 3044F HG A1C LEVEL LT 7.0%: CPT | Mod: S$GLB,,, | Performed by: INTERNAL MEDICINE

## 2017-03-17 PROCEDURE — 1126F AMNT PAIN NOTED NONE PRSNT: CPT | Mod: S$GLB,,, | Performed by: INTERNAL MEDICINE

## 2017-03-17 PROCEDURE — 3077F SYST BP >= 140 MM HG: CPT | Mod: S$GLB,,, | Performed by: INTERNAL MEDICINE

## 2017-03-17 PROCEDURE — 99999 PR PBB SHADOW E&M-EST. PATIENT-LVL III: CPT | Mod: PBBFAC,,, | Performed by: INTERNAL MEDICINE

## 2017-03-17 PROCEDURE — 1160F RVW MEDS BY RX/DR IN RCRD: CPT | Mod: S$GLB,,, | Performed by: INTERNAL MEDICINE

## 2017-03-17 PROCEDURE — 3078F DIAST BP <80 MM HG: CPT | Mod: S$GLB,,, | Performed by: INTERNAL MEDICINE

## 2017-03-17 PROCEDURE — 3066F NEPHROPATHY DOC TX: CPT | Mod: S$GLB,,, | Performed by: INTERNAL MEDICINE

## 2017-03-17 PROCEDURE — 1159F MED LIST DOCD IN RCRD: CPT | Mod: S$GLB,,, | Performed by: INTERNAL MEDICINE

## 2017-03-17 PROCEDURE — 1157F ADVNC CARE PLAN IN RCRD: CPT | Mod: S$GLB,,, | Performed by: INTERNAL MEDICINE

## 2017-03-17 PROCEDURE — 99213 OFFICE O/P EST LOW 20 MIN: CPT | Mod: S$GLB,,, | Performed by: INTERNAL MEDICINE

## 2017-03-17 PROCEDURE — 99499 UNLISTED E&M SERVICE: CPT | Mod: S$GLB,,, | Performed by: INTERNAL MEDICINE

## 2017-03-21 NOTE — PROGRESS NOTES
Subjective:       Patient ID: Dariel Urbina is a 76 y.o. White male who presents for follow-up evaluation of Chronic Kidney Disease    HPI      Mr. Urbina is a 76 year old man with past medical history of hypertension presenting for follow up of chronic kidney disease.  Patient reports chronic shortness of breath with exertion, otherwise denies any symptoms, no fever, chest pain, abdominal pain, diarrhea, dysuria/hematuria. He reports blood pressure usually well-controlled, less than 130 systolic.  He reports adherence to low potassium diet most of the time.    Review of Systems   Constitutional: Negative for appetite change, fatigue and fever.   Respiratory: Negative for cough and shortness of breath.    Cardiovascular: Negative for chest pain and leg swelling.   Gastrointestinal: Negative for abdominal pain, constipation, diarrhea, nausea and vomiting.   Genitourinary: Negative for dysuria, flank pain, frequency, hematuria and urgency.   Musculoskeletal: Negative for arthralgias, back pain and joint swelling.   Skin: Negative for rash.   Neurological: Negative for dizziness and light-headedness.   All other systems reviewed and are negative.      Objective:      Physical Exam   Constitutional: He appears well-developed and well-nourished.   Cardiovascular: Normal rate and regular rhythm.  Exam reveals no gallop and no friction rub.    No murmur heard.  Pulmonary/Chest: Effort normal and breath sounds normal. No respiratory distress. He has no wheezes. He has no rales.   Musculoskeletal: He exhibits no edema.   Neurological: He is alert.   Skin: Skin is warm and dry. No rash noted.   Vitals reviewed.      Assessment:       1. Controlled type 2 diabetes mellitus with stage 3 chronic kidney disease, without long-term current use of insulin    2. Hyperkalemia        Plan:      Mr. Urbina is a 76 year old man with past medical history of hypertension presenting for follow up of chronic kidney disease stage III.   Creatinine within baseline range, improved since last visit, will continue to trend for now. Stressed importance of blood pressure/sugar control to prevent any further progression of kidney disease, patient voiced understanding. Encouraged weight loss and exercise as tolerated, along with fluid intake.   - Hypertension: blood pressure at goal, continue current meds   - Anemia: Hgb at goal, no indication for erythropoetin therapy  - Bone/mineral metabolism: patient with secondary hyperparathyroidism, will trend PTH  - Hyperkalemia: improved, again discussed low potassium diet, patient previously given handout.      Return to clinic 5-6 months with renal panel within 4 weeks, then with renal/heme panel, iron/TIBC/ferritin, urinalysis/culture, urine protein/creatinine ratio, PTH/VitD prior to next visit

## 2017-03-27 ENCOUNTER — ANTI-COAG VISIT (OUTPATIENT)
Dept: CARDIOLOGY | Facility: CLINIC | Age: 77
End: 2017-03-27
Payer: MEDICARE

## 2017-03-27 ENCOUNTER — INITIAL CONSULT (OUTPATIENT)
Dept: DERMATOLOGY | Facility: CLINIC | Age: 77
End: 2017-03-27
Payer: MEDICARE

## 2017-03-27 ENCOUNTER — INITIAL CONSULT (OUTPATIENT)
Dept: OPTOMETRY | Facility: CLINIC | Age: 77
End: 2017-03-27
Payer: MEDICARE

## 2017-03-27 ENCOUNTER — LAB VISIT (OUTPATIENT)
Dept: LAB | Facility: HOSPITAL | Age: 77
End: 2017-03-27
Payer: MEDICARE

## 2017-03-27 DIAGNOSIS — H04.203 EPIPHORA, BILATERAL: ICD-10-CM

## 2017-03-27 DIAGNOSIS — Z95.2 H/O MECHANICAL AORTIC VALVE REPLACEMENT: ICD-10-CM

## 2017-03-27 DIAGNOSIS — H26.9 CORTICAL CATARACT OF BOTH EYES: ICD-10-CM

## 2017-03-27 DIAGNOSIS — Z79.01 LONG TERM CURRENT USE OF ANTICOAGULANT THERAPY: Primary | ICD-10-CM

## 2017-03-27 DIAGNOSIS — H25.13 NUCLEAR SCLEROSIS, BILATERAL: Primary | ICD-10-CM

## 2017-03-27 DIAGNOSIS — L82.1 SEBORRHEIC KERATOSES: ICD-10-CM

## 2017-03-27 DIAGNOSIS — H35.372 EPIRETINAL MEMBRANE (ERM) OF LEFT EYE: ICD-10-CM

## 2017-03-27 DIAGNOSIS — H52.4 PRESBYOPIA OU: ICD-10-CM

## 2017-03-27 DIAGNOSIS — L81.4 LENTIGINES: ICD-10-CM

## 2017-03-27 DIAGNOSIS — D18.00 ANGIOMA: ICD-10-CM

## 2017-03-27 DIAGNOSIS — Z79.01 LONG TERM CURRENT USE OF ANTICOAGULANT THERAPY: ICD-10-CM

## 2017-03-27 DIAGNOSIS — L21.9 SEBORRHEIC DERMATITIS: Primary | ICD-10-CM

## 2017-03-27 DIAGNOSIS — H52.203 ASTIGMATISM, BILATERAL: ICD-10-CM

## 2017-03-27 LAB
CREAT SERPL-MCNC: 1.3 MG/DL
EST. GFR  (AFRICAN AMERICAN): >60 ML/MIN/1.73 M^2
EST. GFR  (NON AFRICAN AMERICAN): 53.4 ML/MIN/1.73 M^2
INR PPP: 2.2 (ref 2–3)

## 2017-03-27 PROCEDURE — 85610 PROTHROMBIN TIME: CPT | Mod: QW,S$GLB,, | Performed by: PHARMACIST

## 2017-03-27 PROCEDURE — 1159F MED LIST DOCD IN RCRD: CPT | Mod: S$GLB,,, | Performed by: DERMATOLOGY

## 2017-03-27 PROCEDURE — 99202 OFFICE O/P NEW SF 15 MIN: CPT | Mod: S$GLB,,, | Performed by: DERMATOLOGY

## 2017-03-27 PROCEDURE — 92014 COMPRE OPH EXAM EST PT 1/>: CPT | Mod: S$GLB,,, | Performed by: OPTOMETRIST

## 2017-03-27 PROCEDURE — 99211 OFF/OP EST MAY X REQ PHY/QHP: CPT | Mod: 25,S$GLB,, | Performed by: PHARMACIST

## 2017-03-27 PROCEDURE — 92134 CPTRZ OPH DX IMG PST SGM RTA: CPT | Mod: LT,S$GLB,, | Performed by: OPTOMETRIST

## 2017-03-27 PROCEDURE — 36415 COLL VENOUS BLD VENIPUNCTURE: CPT

## 2017-03-27 PROCEDURE — 99999 PR PBB SHADOW E&M-EST. PATIENT-LVL II: CPT | Mod: PBBFAC,,, | Performed by: OPTOMETRIST

## 2017-03-27 PROCEDURE — 99999 PR PBB SHADOW E&M-EST. PATIENT-LVL II: CPT | Mod: PBBFAC,,, | Performed by: DERMATOLOGY

## 2017-03-27 PROCEDURE — 1160F RVW MEDS BY RX/DR IN RCRD: CPT | Mod: S$GLB,,, | Performed by: DERMATOLOGY

## 2017-03-27 PROCEDURE — 1157F ADVNC CARE PLAN IN RCRD: CPT | Mod: S$GLB,,, | Performed by: DERMATOLOGY

## 2017-03-27 PROCEDURE — 92015 DETERMINE REFRACTIVE STATE: CPT | Mod: S$GLB,,, | Performed by: OPTOMETRIST

## 2017-03-27 PROCEDURE — 82565 ASSAY OF CREATININE: CPT

## 2017-03-27 NOTE — MR AVS SNAPSHOT
[facility] - Optometry  [address]  [place] [address]  Phone: [phone]  Fax: [phone]                  Dariel Castanedajean   3/27/2017 9:30 AM   Initial consult    Description:  Male : 1940   Provider:  Joey Vasquez OD   Department:  [facility]           Reason for Visit     Diabetic Eye Exam           Diagnoses this Visit        Comments    Nuclear sclerosis, bilateral    -  Primary     Cortical cataract of both eyes         Epiretinal membrane (ERM) of left eye                To Do List           Future Appointments        Provider Department Dept Phone    3/31/2017 10:15 AM LAB, APPOINTMENT [place] [facility] [phone]    2017 10:00 AM Marcie Lundy, PharmD [facility] Coumad[facility] [phone]    2017 10:40 AM José Miguel Vences MD [facility]-Inter[facility] [phone]    2017 7:00 AM LAB, SAME DAY [facility] [phone]    2017 8:00 AM Devon Langston Jr., MD [facility] - Internal Medicine [phone]      Your Future Surgeries/Procedures     Mar 31, 2017   Surgery with Bruno Raymond MD   [facility]    [address] [address] [phone]              Goals (5 Years of Data)     None      [facility]     [facility] Nurse Care Line - 24/7 Assistance  Registered nurses in the [facility] provide clinical advisement, health education, appointment booking, and other advisory services.  Call for this free service at [phone].             Medications           Message regarding Medications     Verify the changes and/or additions to your medication regime listed below are the same as discussed with your clinician today.  If any of these changes or additions are incorrect, please notify your healthcare provider.             Verify that the below list of medications is an accurate representation of  the medications you are currently taking.  If none reported, the list may be blank. If incorrect, please contact your healthcare provider. Carry this list with you in case of emergency.           Current Medications     allopurinol (ZYLOPRIM) 100 MG tablet TAKE 1 TABLET ONE TIME DAILY    atorvastatin (LIPITOR) 80 MG tablet     fenofibrate micronized (LOFIBRA) 200 MG Cap Take 200 mg by mouth daily with breakfast.    fosinopril (MONOPRIL) 40 MG tablet TAKE 1 TABLET ONE TIME DAILY    furosemide (LASIX) 20 MG tablet TAKE 1 TABLET ONE TIME DAILY    glimepiride (AMARYL) 1 MG tablet Take 1 tablet (1 mg total) by mouth every morning.    hydrALAZINE (APRESOLINE) 25 MG tablet Take 1 tablet (25 mg total) by mouth 3 (three) times daily.    isosorbide mononitrate (IMDUR) 120 MG 24 hr tablet Take 1 tablet (120 mg total) by mouth once daily.    metoprolol tartrate (LOPRESSOR) 50 MG tablet Take 1 tablet (50 mg total) by mouth once daily.    nitroGLYCERIN (NITROSTAT) 0.4 MG SL tablet Place 1 tablet (0.4 mg total) under the tongue every 5 (five) minutes as needed. As needed for chest pain    omega-3 acid ethyl esters (LOVAZA) 1 gram capsule Take 2 capsules (2 g total) by mouth 2 (two) times daily.    warfarin (COUMADIN) 5 MG tablet Take 1 tablet (5 mg total) by mouth Daily. Current Dose (7.5 mg on Mon, Wed, Fri; 5 mg all other days)           Clinical Reference Information           Allergies as of 3/27/2017     No Known Allergies      Immunizations Administered on Date of Encounter - 3/27/2017     None      Orders Placed During Today's Visit      Normal Orders This Visit    Posterior Segment OCT Retina-Both eyes       [facility] Sign-Up     Activating your [facility] account is as easy as 1-2-3!     1) Visit [email or web address], select Sign Up Now, enter this activation code and your date of birth, then select Next.  [email or web address]  Expires: 5/11/2017 10:39 AM      2) Create a username and password to use when you visit [facility] in the  future and select a security question in case you lose your password and select Next.    3) Enter your e-mail address and click Sign Up!    Additional Information  If you have questions, please e-mail [email or web address] or call [phone] to talk to our [facility] staff. Remember, [facility] is NOT to be used for urgent needs. For medical emergencies, dial 911.         Instructions    Bilateral early nuclear sclerosis of lens of both eyes, consistent with age, and bilateral early cortical cataract in both eyes.   No need for cataract surgery in either eye at this time.  Type 2 diabetes without evidence of diabetic retinopathy in either eye.  Mild epiretinal membrane at macula/posterior pole of the left eye.  Order OCT of retina at macula, for baseline.  Regular astigmatism in each eye, with satisfactory correctable VA in each eye.  Presbyopia.  New spectacle lens Rx issued for use as desired.  Repeat general eye exam and refraction in one year.        Language Assistance Services     ATTENTION: Language assistance services are available, free of charge. Please call [phone].      ATENCIÓN: Si habla stanislaw, tiene a guevara disposición servicios gratuitos de asistencia lingüística. Llame al [phone].     OLIVE Ý: N?u b?n nói Ti?ng Vi?t, có các d?ch v? h? tr? ngôn ng? mi?n phí dành cho b?n. G?i s? [phone].         [facility] complies with applicable Federal civil rights laws and does not discriminate on the basis of race, color, national origin, age, disability, or sex.

## 2017-03-27 NOTE — LETTER
March 27, 2017      Devon Langston Jr., MD  [address]  [place] [address]           [facility] - Dermatology  [address]  [facility] [address]  Phone: [phone]  Fax: [phone]          Patient: Dariel Urbina   MR Number: [id number]   YOB: 1940   Date of Visit: 3/27/2017       Dear Dr. Devon Langston Jr.:    Thank you for referring Dariel Urbina to me for evaluation. Attached you will find relevant portions of my assessment and plan of care.    If you have questions, please do not hesitate to call me. I look forward to following Dariel Urbina along with you.    Sincerely,    Priscilla Harrison MD    Enclosure  CC:  No Recipients    If you would like to receive this communication electronically, please contact [email or web address] or [phone] to request more information on [employer] Link access.    For providers and/or their staff who would like to refer a patient to [facility], please contact us through our one-stop-shop provider referral line, [facility], at [phone].    If you feel you have received this communication in error or would no longer like to receive these types of communications, please e-mail [email or web address]

## 2017-03-27 NOTE — PROGRESS NOTES
Subjective:       Patient ID:  Dariel Urbina is a 76 y.o. male who presents for   Chief Complaint   Patient presents with    Skin Check     face     HPI  77 yo M presents with his wife for evaluation of a few spots on his face.  He noticed a few red spots on his nose and cheeks x several years, persistent.  No prior treatments.  He also noticed some scaling in his scalp.  He uses Head and Shoulders shampoo with mild improvement    Denies personal or family h/o skin cancer    Past Medical History:   Diagnosis Date    Bilateral carotid artery disease 2/9/2017    Bleeding     CAD (coronary atherosclerotic disease) 9/11/2012    Cataract     Chronic kidney disease     DM (diabetes mellitus) 9/26/2012    History of gout 9/26/2012    Hyperlipidemia     Hypertension     Mechanical heart valve present     Renal insufficiency 9/11/2012    Type II or unspecified type diabetes mellitus without mention of complication, not stated as uncontrolled      Review of Systems   Skin: Positive for activity-related sunscreen use. Negative for daily sunscreen use and recent sunburn.   Hematologic/Lymphatic: Does not bruise/bleed easily.        Objective:    Physical Exam   Constitutional: He appears well-developed and well-nourished. No distress.   Neurological: He is alert and oriented to person, place, and time. He is not disoriented.   Psychiatric: He has a normal mood and affect.   Skin:   Areas Examined (abnormalities noted in diagram):   Scalp / Hair Palpated and Inspected  Head / Face Inspection Performed  Neck Inspection Performed  Chest / Axilla Inspection Performed  Abdomen Inspection Performed  Back Inspection Performed  RUE Inspected  LUE Inspection Performed                   Diagram Legend     Erythematous scaling macule/papule c/w actinic keratosis       Vascular papule c/w angioma      Pigmented verrucoid papule/plaque c/w seborrheic keratosis      Yellow umbilicated papule c/w sebaceous hyperplasia       Irregularly shaped tan macule c/w lentigo     1-2 mm smooth white papules consistent with Milia      Movable subcutaneous cyst with punctum c/w epidermal inclusion cyst      Subcutaneous movable cyst c/w pilar cyst      Firm pink to brown papule c/w dermatofibroma      Pedunculated fleshy papule(s) c/w skin tag(s)      Evenly pigmented macule c/w junctional nevus     Mildly variegated pigmented, slightly irregular-bordered macule c/w mildly atypical nevus      Flesh colored to evenly pigmented papule c/w intradermal nevus       Pink pearly papule/plaque c/w basal cell carcinoma      Erythematous hyperkeratotic cursted plaque c/w SCC      Surgical scar with no sign of skin cancer recurrence      Open and closed comedones      Inflammatory papules and pustules      Verrucoid papule consistent consistent with wart     Erythematous eczematous patches and plaques     Dystrophic onycholytic nail with subungual debris c/w onychomycosis     Umbilicated papule    Erythematous-base heme-crusted tan verrucoid plaque consistent with inflamed seborrheic keratosis     Erythematous Silvery Scaling Plaque c/w Psoriasis     See annotation      Assessment / Plan:        Seborrheic dermatitis  Recommended Selsun blue  If this is not sufficient, will try ketoconazole shampoo +/- lidex soln.  Pt will call    Angioma  This is a benign vascular lesion. Reassurance given. No treatment required.     Lentigines  These are benign hyperpigmented sun induced lesions. Daily sun protection will reduce the number of new lesions.     Seborrheic keratoses  These are benign inherited growths without a malignant potential. Reassurance given to patient. No treatment is necessary.            Return in about 1 year (around 3/27/2018).

## 2017-03-27 NOTE — LETTER
April 1, 2017      Devon Langston Jr., MD  [address]  [facility] [address]           [facility]  [address]  [facility] [address]  Phone: [phone]  Fax: [phone]          Patient: Dariel Urbina   MR Number: [id number]   YOB: 1940   Date of Visit: 3/27/2017       Dear Dr. Devon Langston Jr.:    Thank you for referring Dariel Urbina to me for evaluation. Attached you will find relevant portions of my assessment and plan of care.    If you have questions, please do not hesitate to call me. I look forward to following Dariel Urbina along with you.    Sincerely,    Joey Vasquez, OD    Enclosure  CC:  No Recipients    If you would like to receive this communication electronically, please contact [email or web address] or [phone] to request more information on [employer] Link access.    For providers and/or their staff who would like to refer a patient to [facility], please contact us through our one-stop-shop provider referral line, [facility], at [phone].    If you feel you have received this communication in error or would no longer like to receive these types of communications, please e-mail [email or web address]

## 2017-03-27 NOTE — PATIENT INSTRUCTIONS
Bilateral early nuclear sclerosis of lens of both eyes, consistent with age, and bilateral early cortical cataract in both eyes.   No need for cataract surgery in either eye at this time.  Type 2 diabetes without evidence of diabetic retinopathy in either eye.  Mild epiretinal membrane at macula/posterior pole of the left eye.  Order OCT of retina at macula, for baseline.  Regular astigmatism in each eye, with satisfactory correctable VA in each eye.  Presbyopia.  New spectacle lens Rx issued for use as desired.  Repeat general eye exam and refraction in one year.

## 2017-03-27 NOTE — PROGRESS NOTES
Patient scheduled to have C-scope 3/31.He has already taken his coumadin this morning so since his procedure is 3/31, he will hold coumadin 3 days (3/28-3/30). INR is scheduled for 3/31 prior to procedure. (77yo, ht=5'6, wt=89.1kg, Scr=1.3) Crcl>30; lovenox dose=90mg every 12 hours. (3/29 PM; 3/30 AM ONLY, NONE 3/31, 4/1-4/2=AM & PM) Patient was given dosing instructions per calendar and advised to follow this unless otherwise advised by performing physician.   rx for lovenox called in to [facility] for enoxaparin 100mg syringes #12 with 1 refill.

## 2017-03-27 NOTE — PROGRESS NOTES
HPI     Diabetic Eye Exam    Additional comments: C/O epiphora OU.  Recent apparent allergy-related   itchinng (2/2 pollen), which has resolved.  General eye examination and   refraction.  Diabetes x 2 years.  NIDDM.           Comments   Patient's age: 76 y.o. WM   Occupation: Retired   Approximate date of last eye examination:  03/07/2016  Name of last eye doctor seen: Dr. Manny Ballard  City/State: [facility]  Wears glasses? Yes, Only to drive and do small work      If yes, wears  Full-time or part-time?  Part time   Present glasses are: Bifocal, SV Distance, SV Reading?  Bifocal   Approximate age of present glasses:  1 yr   Got new glasses following last exam, or subsequently?:  yes   Any problem with VA with glasses?  No  Wears CLs?:  No  Headaches?  No  Eye pain/discomfort?  No                                                                                     Flashes?  Yes , for the past 12 months +/-.   Not sure which eye (or   both?).   Floaters No  Diplopia/Double vision? No  Patient's Ocular History:         Any eye surgeries? No         Any eye injury?  No         Any treatment for eye disease?  No  Family history of eye disease?  No  Significant patient medical history:         1. Diabetes?  yes       If yes, IDDM or NIDDM?  NIDDM  ..Hemoglobin A1C       Date                     Value               Ref Range             Status                02/02/2017               5.8                 4.5 - 6.2 %           Final              Comment:    According to ADA guidelines, hemoglobin A1C <7.0% represents  optimal   control in non-pregnant diabetic patients.  Different  metrics may apply   to specific populations.   Standards of Medical Care in Diabetes -   2016.  For the purpose of screening for the presence of diabetes:  <5.7%       Consistent with the absence of diabetes  5.7-6.4%  Consistent with   increasing risk for diabetes   (prediabetes)  >or=6.5%  Consistent with   diabetes  Currently no consensus exists for  "use of hemoglobin A1C  for   diagnosis of diabetes for children.         02/25/2016               6.7 (H)             4.5 - 6.2 %           Final                 02/24/2014               7.0 (H)             4.5 - 6.2 %           Final            ----------   2. HBP?  "I don't think so".  But he states he takes "four different kind   of blood pills."                                   Problem list does list HBP.                 ! OTC eyedrops currently using:  No   ! Prescription eye meds currently using:  No   ! Any history of allergy/adverse reaction to any eye meds used   previously?  No    ! Any history of allergy/adverse reaction to eyedrops used during prior   eye exam(s)? No    ! Any history of allergy/adverse reaction to Novacaine or similar meds?   No   ! Any history of allergy/adverse reaction to Epinephrine or similar meds?   No    ! Patient okay with use of anesthetic eyedrops to check eye pressure?    yes        ! Patient okay with use of eyedrops to dilate pupils today?  yes   !  Allergies/Medications/Medical History/Family History reviewed today?    Yes       PD =   64/60  Desired reading distance =  13"                                                                Last edited by Joey Vasquez, OD on 3/27/2017 10:09 AM. (History)            Assessment /Plan     For exam results, see Encounter Report.    1. Nuclear sclerosis, bilateral     2. Cortical cataract of both eyes     3. Epiretinal membrane (ERM) of left eye  Posterior Segment OCT Retina-Both eyes   4. Epiphora, bilateral     5. Astigmatism, bilateral     6. Presbyopia OU                      Bilateral early nuclear sclerosis of lens of both eyes, consistent with age, and bilateral early cortical cataract in both eyes.   No need for cataract surgery in either eye at this time.  Type 2 diabetes without evidence of diabetic retinopathy in either eye.  Mild epiretinal membrane at macula/posterior pole of the left eye.  Order OCT of retina at " macula, for baseline.  Regular astigmatism in each eye, with satisfactory correctable VA in each eye.  Presbyopia.  New spectacle lens Rx issued for use as desired.  Repeat general eye exam and refraction in one year.

## 2017-03-31 ENCOUNTER — ANESTHESIA (OUTPATIENT)
Dept: ENDOSCOPY | Facility: HOSPITAL | Age: 77
End: 2017-03-31
Payer: MEDICARE

## 2017-03-31 ENCOUNTER — SURGERY (OUTPATIENT)
Age: 77
End: 2017-03-31

## 2017-03-31 ENCOUNTER — HOSPITAL ENCOUNTER (OUTPATIENT)
Facility: HOSPITAL | Age: 77
Discharge: HOME | End: 2017-03-31
Attending: INTERNAL MEDICINE | Admitting: INTERNAL MEDICINE
Payer: MEDICARE

## 2017-03-31 ENCOUNTER — ANESTHESIA EVENT (OUTPATIENT)
Dept: ENDOSCOPY | Facility: HOSPITAL | Age: 77
End: 2017-03-31
Payer: MEDICARE

## 2017-03-31 VITALS
WEIGHT: 190 LBS | RESPIRATION RATE: 16 BRPM | HEART RATE: 53 BPM | RESPIRATION RATE: 21 BRPM | HEIGHT: 66 IN | BODY MASS INDEX: 30.53 KG/M2 | TEMPERATURE: 98 F | DIASTOLIC BLOOD PRESSURE: 59 MMHG | SYSTOLIC BLOOD PRESSURE: 103 MMHG | OXYGEN SATURATION: 97 %

## 2017-03-31 DIAGNOSIS — I25.10 CORONARY ARTERY DISEASE: ICD-10-CM

## 2017-03-31 DIAGNOSIS — R19.7 DIARRHEA: ICD-10-CM

## 2017-03-31 DIAGNOSIS — R19.7 DIARRHEA, UNSPECIFIED TYPE: Primary | ICD-10-CM

## 2017-03-31 LAB — POCT GLUCOSE: 99 MG/DL (ref 70–110)

## 2017-03-31 PROCEDURE — D9220A PRA ANESTHESIA: Mod: CRNA,,, | Performed by: NURSE ANESTHETIST, CERTIFIED REGISTERED

## 2017-03-31 PROCEDURE — 25000003 PHARM REV CODE 250: Performed by: INTERNAL MEDICINE

## 2017-03-31 PROCEDURE — 93010 ELECTROCARDIOGRAM REPORT: CPT | Mod: ,,, | Performed by: INTERNAL MEDICINE

## 2017-03-31 PROCEDURE — 37000009 HC ANESTHESIA EA ADD 15 MINS: Performed by: INTERNAL MEDICINE

## 2017-03-31 PROCEDURE — 27201012 HC FORCEPS, HOT/COLD, DISP: Performed by: INTERNAL MEDICINE

## 2017-03-31 PROCEDURE — 63600175 PHARM REV CODE 636 W HCPCS: Performed by: NURSE ANESTHETIST, CERTIFIED REGISTERED

## 2017-03-31 PROCEDURE — 25000003 PHARM REV CODE 250: Performed by: NURSE ANESTHETIST, CERTIFIED REGISTERED

## 2017-03-31 PROCEDURE — 82962 GLUCOSE BLOOD TEST: CPT | Performed by: INTERNAL MEDICINE

## 2017-03-31 PROCEDURE — D9220A PRA ANESTHESIA: Mod: ANES,,, | Performed by: ANESTHESIOLOGY

## 2017-03-31 PROCEDURE — 93005 ELECTROCARDIOGRAM TRACING: CPT | Mod: 59

## 2017-03-31 PROCEDURE — 45380 COLONOSCOPY AND BIOPSY: CPT | Performed by: INTERNAL MEDICINE

## 2017-03-31 PROCEDURE — 37000008 HC ANESTHESIA 1ST 15 MINUTES: Performed by: INTERNAL MEDICINE

## 2017-03-31 PROCEDURE — 45380 COLONOSCOPY AND BIOPSY: CPT | Mod: ,,, | Performed by: INTERNAL MEDICINE

## 2017-03-31 PROCEDURE — 88305 TISSUE EXAM BY PATHOLOGIST: CPT | Performed by: PATHOLOGY

## 2017-03-31 RX ORDER — SODIUM CHLORIDE 9 MG/ML
INJECTION, SOLUTION INTRAVENOUS CONTINUOUS
Status: DISCONTINUED | OUTPATIENT
Start: 2017-03-31 | End: 2017-03-31 | Stop reason: HOSPADM

## 2017-03-31 RX ORDER — LIDOCAINE HCL/PF 100 MG/5ML
SYRINGE (ML) INTRAVENOUS
Status: DISCONTINUED | OUTPATIENT
Start: 2017-03-31 | End: 2017-03-31

## 2017-03-31 RX ORDER — PROPOFOL 10 MG/ML
VIAL (ML) INTRAVENOUS
Status: DISCONTINUED | OUTPATIENT
Start: 2017-03-31 | End: 2017-03-31

## 2017-03-31 RX ADMIN — LIDOCAINE HYDROCHLORIDE 50 MG: 20 INJECTION, SOLUTION INTRAVENOUS at 11:03

## 2017-03-31 RX ADMIN — SODIUM CHLORIDE: 0.9 INJECTION, SOLUTION INTRAVENOUS at 09:03

## 2017-03-31 RX ADMIN — PROPOFOL 50 MG: 10 INJECTION, EMULSION INTRAVENOUS at 11:03

## 2017-03-31 NOTE — IP AVS SNAPSHOT
[facility]  [address]  [facility] [address]  Phone: [phone]           Patient Discharge Instructions   Our goal is to set you up for success. This packet includes information on your condition, medications, and your home care.  It will help you care for yourself to prevent having to return to the hospital.     Please ask your nurse if you have any questions.      There are many details to remember when preparing to leave the hospital. Here is what you will need to do:    1. Take your medicine. If you are prescribed medications, review your Medication List on the following pages. You may have new medications to  at the pharmacy and others that you'll need to stop taking. Review the instructions for how and when to take your medications. Talk with your doctor or nurses if you are unsure of what to do.     2. Go to your follow-up appointments. Specific follow-up information is listed in the following pages. Your may be contacted by a nurse or clinical provider about future appointments. Be sure we have all of the phone numbers to reach you. Please contact your provider's office if you are unable to make an appointment.     3. Watch for warning signs. Your doctor or nurse will give you detailed warning signs to watch for and when to call for assistance. These instructions may also include educational information about your condition. If you experience any of warning signs to your health, call your doctor.           [facility]  Unless otherwise directed by your provider, please   contact [facility], our nurse care line   that is available for 24/7 assistance.     [phone] (toll-free)     Registered nurses in the [facility]   provide: appointment scheduling, clinical advisement, health education, and other advisory services.                  ** Verify the list of medication(s) below is accurate and up to date. Carry this with you in case of  emergency. If your medications have changed, please notify your healthcare provider.             Medication List      CONTINUE taking these medications        Additional Info                      allopurinol 100 MG tablet   Commonly known as:  ZYLOPRIM   Quantity:  90 tablet   Refills:  4    Instructions:  TAKE 1 TABLET ONE TIME DAILY     Begin Date    AM    Noon    PM    Bedtime       atorvastatin 80 MG tablet   Commonly known as:  LIPITOR   Refills:  0      Begin Date    AM    Noon    PM    Bedtime       fenofibrate micronized 200 MG Cap   Commonly known as:  LOFIBRA   Refills:  0   Dose:  200 mg    Instructions:  Take 200 mg by mouth daily with breakfast.     Begin Date    AM    Noon    PM    Bedtime       fosinopril 40 MG tablet   Commonly known as:  MONOPRIL   Quantity:  90 tablet   Refills:  4    Instructions:  TAKE 1 TABLET ONE TIME DAILY     Begin Date    AM    Noon    PM    Bedtime       furosemide 20 MG tablet   Commonly known as:  LASIX   Quantity:  90 tablet   Refills:  4    Instructions:  TAKE 1 TABLET ONE TIME DAILY     Begin Date    AM    Noon    PM    Bedtime       glimepiride 1 MG tablet   Commonly known as:  AMARYL   Quantity:  90 tablet   Refills:  2   Dose:  1 mg    Instructions:  Take 1 tablet (1 mg total) by mouth every morning.     Begin Date    AM    Noon    PM    Bedtime       hydrALAZINE 25 MG tablet   Commonly known as:  APRESOLINE   Quantity:  279 tablet   Refills:  4   Dose:  25 mg    Instructions:  Take 1 tablet (25 mg total) by mouth 3 (three) times daily.     Begin Date    AM    Noon    PM    Bedtime       isosorbide mononitrate 120 MG 24 hr tablet   Commonly known as:  IMDUR   Quantity:  90 tablet   Refills:  4   Dose:  120 mg    Instructions:  Take 1 tablet (120 mg total) by mouth once daily.     Begin Date    AM    Noon    PM    Bedtime       metoprolol tartrate 50 MG tablet   Commonly known as:  LOPRESSOR   Quantity:  90 tablet   Refills:  3   Dose:  50 mg    Instructions:  Take 1  tablet (50 mg total) by mouth once daily.     Begin Date    AM    Noon    PM    Bedtime       nitroGLYCERIN 0.4 MG SL tablet   Commonly known as:  NITROSTAT   Quantity:  90 tablet   Refills:  4   Dose:  0.4 mg    Instructions:  Place 1 tablet (0.4 mg total) under the tongue every 5 (five) minutes as needed. As needed for chest pain     Begin Date    AM    Noon    PM    Bedtime       omega-3 acid ethyl esters 1 gram capsule   Commonly known as:  LOVAZA   Quantity:  360 capsule   Refills:  5   Dose:  2 g    Instructions:  Take 2 capsules (2 g total) by mouth 2 (two) times daily.     Begin Date    AM    Noon    PM    Bedtime       warfarin 5 MG tablet   Commonly known as:  COUMADIN   Quantity:  120 tablet   Refills:  3   Dose:  5 mg   Comments:  I.D ([id number])    Instructions:  Take 1 tablet (5 mg total) by mouth Daily. Current Dose (7.5 mg on Mon, Wed, Fri; 5 mg all other days)     Begin Date    AM    Noon    PM    Bedtime                  Please bring to all follow up appointments:    1. A copy of your discharge instructions.  2. All medicines you are currently taking in their original bottles.  3. Identification and insurance card.    Please arrive 15 minutes ahead of scheduled appointment time.    Please call 24 hours in advance if you must reschedule your appointment and/or time.        Your Scheduled Appointments     Apr 03, 2017  9:45 AM CDT   Anticoagulation with Ellen Banegas - Coumadin ([facility] )    [address]  [place] [address]   [phone]            Apr 12, 2017 10:00 AM CDT   Anticoagulation with Ellen Banegas - Coumadin ([facility] )    [address]  [place] [address]   [phone]            Apr 12, 2017 10:40 AM CDT   Established Patient Visit with MD Abdulkadir Blanchard-Interventional Card ([facility] )    [address]  [place] [address]   [phone]            May 29,  2017  7:00 AM CDT   Fasting Lab with LAB, SAME DAY NOMC INTMED   [facility] ([facility])    [address]  [place] [id number] [phone]            Jun 05, 2017  8:00 AM CDT   Established Patient Visit with Devon Langston Jr., MD   [facility] - Internal Medicine ([facility])    [address]  [place] [id number] [phone]              Your Future Surgeries/Procedures     Mar 31, 2017   Surgery with Bruno Raymond MD   [facility] ([facility])    [address] [facility] [address] [phone]              Follow-up Information     Follow up with Devon Langston MD.    Specialty:  Internal Medicine    Contact information:    [address]  [place] [id number] [phone]          Discharge Instructions     Future Orders    Diet general     Questions:    Total calories:      Fat restriction, if any:      Protein restriction, if any:      Na restriction, if any:      Fluid restriction:      Additional restrictions:          Discharge Instructions         Colonoscopy     A camera attached to a flexible tube with a viewing lens is used to take video pictures.     Colonoscopy is a test to view the inside of your lower digestive tract (colon and rectum). Sometimes it can show the last part of the small intestine (ileum). During the test, small pieces of tissue may be removed for testing. This is called a biopsy. Small growths, such as polyps, may also be removed.   Why is colonoscopy done?  The test is done to help look for colon cancer. And it can help find the source of abdominal pain, bleeding, and changes in bowel habits. It may be needed once a year, depending on factors such as your:  · Age  · Health history  · Family health history  · Symptoms  · Results from any prior colonoscopy  Risks and possible complications  These  include:  · Bleeding               · A puncture or tear in the colon   · Risks of anesthesia  · A cancer lesion not being seen  Getting ready   To prepare for the test:  · Talk with your healthcare provider about the risks of the test (see below). Also ask your healthcare provider about alternatives to the test.  · Tell your healthcare provider about any medicines you take. Also tell him or her about any health conditions you may have.  · Make sure your rectum and colon are empty for the test. Follow the diet and bowel prep instructions exactly. If you dont, the test may need to be rescheduled.  · Plan for a friend or family member to drive you home after the test.     Colonoscopy provides an inside view of the entire colon.     You may discuss the results with your doctor right away or at a future visit.  During the test   The test is usually done in the hospital on an outpatient basis. This means you go home the same day. The procedure takes about 30 minutes. During that time:  · You are given relaxing (sedating) medicine through an IV line. You may be drowsy, or fully asleep.  · The healthcare provider will first give you a physical exam to check for anal and rectal problems.  · Then the anus is lubricated and the scope inserted.  · If you are awake, you may have a feeling similar to needing to have a bowel movement. You may also feel pressure as air is pumped into the colon. Its OK to pass gas during the procedure.  · Biopsy, polyp removal, or other treatments may be done during the test.  After the test   You may have gas right after the test. It can help to try to pass it to help prevent later bloating. Your healthcare provider may discuss the results with you right away. Or you may need to schedule a follow-up visit to talk about the results. After the test, you can go back to your normal eating and other activities. You may be tired from the sedation and need to rest for a few hours.  Date Last Reviewed:  "11/1/2016  © 8468-6937 [employer]. [address] [place] [address], [place] [address]. All rights reserved. This information is not intended as a substitute for professional medical care. Always follow your healthcare professional's instructions.            Admission Information     Date & Time Provider Department CSN    3/31/2017  8:58 AM Bonifacio Pelletier MD [facility] [id number]      Care Providers     Provider Role Specialty Primary office phone    Bonifacio Pelletier MD Attending Provider Gastroenterology [phone]    Bruno Raymond MD Surgeon  Gastroenterology [phone]      Your Vitals Were     BP Pulse Temp Resp Height Weight    103/54 61 98 °F (36.7 °C) 20 5' 6" (1.676 m) 86.2 kg (190 lb)    SpO2 BMI             95% 30.67 kg/m2         Recent Lab Values        4/27/2010 5/8/2012 7/27/2012 12/28/2012 6/27/2013 2/24/2014 2/25/2016 2/2/2017      7:16 AM 12:33 PM  7:25 AM  7:11 AM  8:57 AM  7:29 AM 10:47 AM  7:50 AM    A1C 7.0 (H) 7.1 (H) 6.6 (H) 7.2 (H) 7.5 (H) 7.0 (H) 6.7 (H) 5.8    Comment for A1C at  7:50 AM on 2/2/2017:  According to ADA guidelines, hemoglobin A1C <7.0% represents  optimal control in non-pregnant diabetic patients.  Different  metrics may apply to specific populations.   Standards of Medical Care in Diabetes - 2016.  For the purpose of screening for the presence of diabetes:  <5.7%     Consistent with the absence of diabetes  5.7-6.4%  Consistent with increasing risk for diabetes   (prediabetes)  >or=6.5%  Consistent with diabetes  Currently no consensus exists for use of hemoglobin A1C  for diagnosis of diabetes for children.        Pending Labs     Order Current Status    Specimen to Pathology - Surgery Collected (03/31/17 1112)    Specimen to Pathology - Surgery Collected (03/31/17 1118)      Allergies as of 3/31/2017     No Known Allergies      Advance Directives     An advance directive is a document which, in the event you are no longer able to make " decisions for yourself, tells your healthcare team what kind of treatment you do or do not want to receive, or who you would like to make those decisions for you.  If you do not currently have an advance directive, [facility] encourages you to create one.  For more information call:  [phone]), [phone] ([phone]),  or log on to [email or web address].        Smoking Cessation     If you would like to quit smoking:   You may be eligible for free services if you are a [place] resident and started smoking cigarettes before September 1, 1988.  Call the Smoking Cessation Trust ([facility]) toll free at [phone] or [phone].   Call 9-434-QUIT-NOW if you do not meet the above criteria.   Contact us via email: [email or web address]   View our website for more information: [email or web address]        Language Assistance Services     ATTENTION: Language assistance services are available, free of charge. Please call [phone].      ATENCIÓN: Si habla español, tiene a guevara disposición servicios gratuitos de asistencia lingüística. Llame al [phone].     CHÚ Ý: N?u b?n nói Ti?ng Vi?t, có các d?ch v? h? tr? ngôn ng? mi?n phí dành cho b?n. G?i s? [phone].        Heart Failure Education       Heart Failure: Being Active  You have a condition called heart failure. Being active doesnt mean that you have to wear yourself out. Even a little movement each day helps to strengthen your heart. If you cant get out to exercise, you can do simple stretching and strengthening exercises at home. These are good ways to keep you well-conditioned and prevent you and your heart from becoming excessively weak.    Ideas to get you started  · Add a little movement to things you do now. Walk to mail letters. Park your car at the far end of the parking lot and walk to the store. Walk up a flight of stairs instead of taking the elevator.  · Choose activities you enjoy. You might walk, swim,  or ride an exercise bike. Things like gardening and washing the car count, too. Other possibilities include: washing dishes, walking the dog, walking around the mall, and doing aerobic activities with friends.  · Join a group exercise program at a [facility] or [facility], a senior center, or a community center. Or look into a hospital cardiac rehabilitation program. Ask your doctor if you qualify.  Tips to keep you going  · Get up and get dressed each day. Go to a coffee shop and read a newspaper or go somewhere that you'll be in the presence of other active people. Youll feel more like being active.  · Make a plan. Choose one or more activities that you enjoy and that you can easily do. Then plan to do at least one each day. You might write your plan on a calendar.  · Go with a friend or a group if you like company. This can help you feel supported and stay motivated, too.  · Plan social events that you enjoy. This will keep you mentally engaged as well as physically motivated to do things you find pleasure in.  For your safety  · Talk with your healthcare provider before starting an exercise program.  · Exercise indoors when its too hot or too cold outside, or when the air quality is poor. Try walking at a shopping mall.  · Wear socks and sturdy shoes to maintain your balance and prevent falls.  · Start slowly. Do a few minutes several times a day at first. Increase your time and speed little by little.  · Stop and rest whenever you feel tired or get short of breath.  · Dont push yourself on days when you dont feel well.  Date Last Reviewed: 3/20/2016  © 0301-0548 [employer]. [address] [place] [address], [place] [address]. All rights reserved. This information is not intended as a substitute for professional medical care. Always follow your healthcare professional's instructions.              Heart Failure: Evaluating Your Heart  You have a condition called heart failure. To evaluate your condition, your doctor  will examine you, ask questions, and do some tests. Along with looking for signs of heart failure, the doctor looks for any other health problems that may have led to heart failure. The results of your evaluation will help your doctor form a treatment plan.  Health history and physical exam  Your visit will start with a health history. Tell the doctor about any symptoms youve noticed and about all medicines you take. Then youll have a physical exam. This includes listening to your heartbeat and breathing. Youll also be checked for swelling (edema) in your legs and neck. When you have fluid buildup or fluid in the lungs, it may be called congestive heart failure.  Diagnosing heart failure     During an echocardiogram, sound waves bounce off the heart. These are converted into a picture on the screen.   The following may be done to help your doctor form a diagnosis:  · X-rays show the size and shape of your heart. These pictures can also show fluid in your lungs.  · An electrocardiogram (ECG or EKG) shows the pattern of your heartbeat. Small pads (electrodes) are placed on your chest, arms, and legs. Wires connect the pads to the ECG machine, which records your hearts electrical signals. This can give the doctor information about heart function.  · An echocardiogram uses ultrasound waves to show the structure and movement of your heart muscle. This shows how well the heart pumps. It also shows the thickness of the heart walls, and if the heart is enlarged. It is one of the most useful, non-invasive tests as it provides information about the heart's general function. This helps your doctor make treatment decisions.  · Lab tests evaluate small amounts of blood or urine for signs of problems. A BNP lab test can help diagnose and evaluate heart failure. BNP stands for B-type natriuretic peptide. The ventricles secrete more BNP when heart failure worsens. Lab tests can also provide information about metabolic  dysfunction or heart dysfunction.  Your treatment plan  Based on the results of your evaluation and tests, your doctor will develop a treatment plan. This plan is designed to relieve some of your heart failure symptoms and help make you more comfortable. Your treatment plan may include:  · Medicine to help your heart work better and improve your quality of life  · Changes in what you eat and drink to help prevent fluid from backing up in your body  · Daily monitoring of your weight and heart failure symptoms to see how well your treatment plan is working  · Exercise to help you stay healthy  · Help with quitting smoking  · Emotional and psychological support to help adjust to the changes  · Referrals to other specialists to make sure you are being treated comprehensively  Date Last Reviewed: 3/21/2016  © 5224-4689 [employer]. [address] [place] [address], [place] [address]. All rights reserved. This information is not intended as a substitute for professional medical care. Always follow your healthcare professional's instructions.              Heart Failure: Making Changes to Your Diet  You have a condition called heart failure. When you have heart failure, excess fluid is more likely to build up in your body because your heart isn't working well. This makes the heart work harder to pump blood. Fluid buildup causes symptoms such as shortness of breath and swelling (edema). This is often referred to as congestive heart failure or CHF. Controlling the amount of salt (sodium) you eat may help stop fluid from building up. Your doctor may also tell you to reduce the amount of fluid you drink.  Reading food labels    Your healthcare provider will tell you how much sodium you can eat each day. Read food labels to keep track. Keep in mind that certain foods are high in salt. These include canned, frozen, and processed foods. Check the amount of sodium in each serving. Watch out for high-sodium ingredients. These  include MSG (monosodium glutamate), baking soda, and sodium phosphate.   Eating less salt  Give yourself time to get used to eating less salt. It may take a little while. Here are some tips to help:  · Take the saltshaker off the table. Replace it with salt-free herb mixes and spices.  · Eat fresh or plain frozen vegetables. These have much less salt than canned vegetables.  · Choose low-sodium snacks like sodium-free pretzels, crackers, or air-popped popcorn.  · Dont add salt to your food when youre cooking. Instead, season your foods with pepper, lemon, garlic, or onion.  · When you eat out, ask that your food be cooked without added salt.  · Avoid eating fried foods as these often have a great deal of salt.  If youre told to limit fluids  You may need to limit how much fluid you have to help prevent swelling. This includes anything that is liquid at room temperature, such as ice cream and soup. If your doctor tells you to limit fluid, try these tips:  · Measure drinks in a measuring cup before you drink them. This will help you meet daily goals.  · Chill drinks to make them more refreshing.  · Suck on frozen lemon wedges to quench thirst.  · Only drink when youre thirsty.  · Chew sugarless gum or suck on hard candy to keep your mouth moist.  · Weigh yourself daily to know if your body's fluid content is rising.  My sodium goal  Your healthcare provider may give you a sodium goal to meet each day. This includes sodium found in food as well as salt that you add. My goal is to eat no more than ___________ mg of sodium per day.     When to call your doctor  Call your doctor right away if you have any symptoms of worsening heart failure. These can include:  · Sudden weight gain  · Increased swelling of your legs or ankles  · Trouble breathing when youre resting or at night  · Increase in the number of pillows you have to sleep on  · Chest pain, pressure, discomfort, or pain in the jaw, neck, or back   Date Last  Reviewed: 3/21/2016  © 7385-3700 [employer]. [address] [place] [address], [place] [address]. All rights reserved. This information is not intended as a substitute for professional medical care. Always follow your healthcare professional's instructions.              Heart Failure: Medicines to Help Your Heart    You have a condition called heart failure (also known as congestive heart failure, or CHF). Your doctor will likely prescribe medicines for heart failure and any underlying health problems you have. Most heart failure patients take one or more types of medicinen. Your healthcare provider will work to find the combination of medicines that works best for you.  Heart failure medicines  Here are the most common heart failure medicines:  · ACE inhibitors lower blood pressure and decrease strain on the heart. This makes it easier for the heart to pump. Angiotensin receptor blockers have similar effects. These are prescribed for some patients instead of ACE inhibitors.  · Beta-blockers relieve stress on the heart. They also improve symptoms. They may also improve the heart's pumping action over time.  · Diuretics (also called water pills) help rid your body of excess water. This can help rid your body of swelling (edema). Having less fluid to pump means your heart doesnt have to work as hard. Some diuretics make your body lose a mineral called potassium. Your doctor will tell you if you need to take supplements or eat more foods high in potassium.  · Digoxin helps your heart pump with more strength. This helps your heart pump more blood with each beat. So, more oxygen-rich blood travels to the rest of the body.  · Aldosterone antagonists help alter hormones and decrease strain on the heart.  · Hydralazine and nitrates are two separate medicines used together to treat heart failure. They may come in one combination pill. They lower blood pressure and decrease how hard the heart has to pump.  Medicines  for related conditions  Controlling other heart problems helps keep heart failure under control, too. Depending on other heart problems you have, medicines may be prescribed to:  · Lower blood pressure (antihypertensives).  · Lower cholesterol levels (statins).  · Prevent blood clots (anticoagulants or aspirin).  · Keep the heartbeat steady (antiarrhythmics).  Date Last Reviewed: 3/5/2016  © 9862-2862 [employer]. [address] [place] [address], [place] [address]. All rights reserved. This information is not intended as a substitute for professional medical care. Always follow your healthcare professional's instructions.              Heart Failure: Procedures That May Help    The heart is a muscle that pumps oxygen-rich blood to all parts of the body. When you have heart failure, the heart is not able to pump as well as it should. Blood and fluid may back up into the lungs (congestive heart failure), and some parts of the body dont get enough oxygen-rich blood to work normally. These problems lead to the symptoms of heart failure.     Certain procedures may help the heart pump better in some cases of heart failure. Some procedures are done to treat health problems that may have caused the heart failure such as coronary artery disease or heart rhythm problems. For more serious heart failure, other options are available.  Treating artery and valve problems  If you have coronary artery disease or valve disease, procedures may be done to improve blood flow. This helps the heart pump better, which can improve heart failure symptoms. First, your doctor may do a cardiac catheterization to help detect clogged blood vessels or valve damage. During this procedure, a  thin tube (catheter) in inserted into a blood vessel and guided to the heart. There a dye is injected and a special type of X-ray (angiogram) is taken of the blood vessels. Procedures to open a blocked artery or fix damaged valves can also be done using  catheterization.  · Angioplasty uses a balloon-tipped instrument at the end of the catheter. The balloon is inflated to widen the narrowed artery. In many cases, a stent is expanded to further support the narrowed artery. A stent is a metal mesh tube.  · Valve surgery repairs or replacement of faulty valves can also be done during catheterization so blood can flow properly through the chambers of the heart.  Bypass surgery is another option to help treat blocked arteries. It uses a healthy blood vessel from elsewhere in the body. The healthy blood vessel is attached above and below the blocked area so that blood can flow around the blocked artery.  Treating heart rhythm problems  A device may be placed in the chest to help a weak heart maintain a healthy, heartbeat so the heart can pump more effectively:  · Pacemaker. A pacemaker is an implanted device that regulates your heartbeat electronically. It monitors your heart's rhythm and generates a painless electric impulse that helps the heart beat in a regular rhythm. A pacemaker is programmed to meet your specific heart rhythm needs.  · Biventricular pacing/cardiac resynchronization therapy. A type of pacemaker that paces both pumping chambers of the heart at the same time to coordinate contractions and to improve the heart's function. Some people with heart failure are candidates for this therapy.  · Implantable cardioverter defibrillator. A device similar to a pacemaker that senses when the heart is beating too fast and delivers an electrical shock to convert the fast rhythm to a normal rhythm. This can be a life saving device.  In severe cases  In more serious cases of heart failure when other treatments no longer work, other options may include:  · Ventricular assist devices (VADs). These are mechanical devices used to take over the pumping function for one or both of the heart's ventricles, or pumping chambers. A VAD may be necessary when heart failure  progresses to the point that medicines and other treatments no longer help. In some cases, a VAD may be used as a bridge to transplant.  · Heart transplant. This is replacing the diseased heart with a healthy one from a donor. This is an option for a few people who are very sick. A heart transplant is very serious and not an option for all patients. Your doctor can tell you more.  Date Last Reviewed: 3/20/2016  © 3694-9601 [employer]. [address] [place] [address], [place] [address]. All rights reserved. This information is not intended as a substitute for professional medical care. Always follow your healthcare professional's instructions.              Heart Failure: Tracking Your Weight  You have a condition called heart failure. When you have heart failure, a sudden weight gain or a steady rise in weight is a warning sign that your body is retaining too much water and salt. This could mean your heart failure is getting worse. If left untreated, it can cause problems for your lungs and result in shortness of breath. Weighing yourself each day is the best way to know if youre retaining water. If your weight goes up quickly, call your doctor. You will be given instructions on how to get rid of the excess water. You will likely need medicines and to avoid salt. This will help your heart work better.  Call your doctor if you gain more than 2 pounds in 1 day, more than 5 pounds in 1 week, or whatever weight gain you were told to report by your doctor. This is often a sign of worsening heart failure and needs to be evaluated and treated. Your doctor will tell you what to do next.   Tips for weighing yourself    · Weigh yourself at the same time each morning, wearing the same clothes. Weigh yourself after urinating and before eating.  · Use the same scale each day. Make sure the numbers are easy to read. Put the scale on a flat, hard surface -- not on a rug or carpet.  · Do not stop weighing yourself. If you  forget one day, weigh again the next morning.  How to use your weight chart  · Keep your weight chart near the scale. Write your weight on the chart as soon as you get off the scale.  · Fill in the month and the start date on the chart. Then write down your weight each day. Your chart will look like this:    · If you miss a day, leave the space blank. Weigh yourself the next day and write your weight in the next space.  · Take your weight chart with you when you go to see your doctor.  Date Last Reviewed: 3/20/2016  © 0945-7743 [employer]. [address] [place] [address], [place] [address]. All rights reserved. This information is not intended as a substitute for professional medical care. Always follow your healthcare professional's instructions.              Heart Failure: Warning Signs of a Flare-Up  You have a condition called heart failure. Once you have heart failure, flare-ups can happen. Below are signs that can mean your heart failure is getting worse. If you notice any of these warning signs, call your healthcare provider.  Swelling    · Your feet, ankles, or lower legs get puffier.  · You notice skin changes on your lower legs.  · Your shoes feel too tight.  · Your clothes are tighter in the waist.  · You have trouble getting rings on or off your fingers.  Shortness of breath  · You have to breathe harder even when youre doing your normal activities or when youre resting.  · You are short of breath walking up stairs or even short distances.  · You wake up at night short of breath or coughing.  · You need to use more pillows or sit up to sleep.  · You wake up tired or restless.  Other warning signs  · You feel weaker, dizzy, or more tired.  · You have chest pain or changes in your heartbeat.  · You have a cough that wont go away.  · You cant remember things or dont feel like eating.  Tracking your weight  Gaining weight is often the first warning sign that heart failure is getting worse. Gaining  even a few pounds can be a sign that your body is retaining excess water and salt. Weighing yourself each day in the morning after you urinate and before you eat, is the best way to know if you're retaining water. Get a scale that is easy to read and make sure you wear the same clothes and use the same scale every time you weigh. Your healthcare provider will show you how to track your weight. Call your doctor if you gain more than 2 pounds in 1 day, 5 pounds in 1 week, or whatever weight gain you were told to report by your doctor. This is often a sign of worsening heart failure and needs to be evaluated and treated before it compromises your breathing. Your doctor will tell you what to do next.    Date Last Reviewed: 3/15/2016  © 4371-4595 [employer]. [address] [place] [address]. All rights reserved. This information is not intended as a substitute for professional medical care. Always follow your healthcare professional's instructions.              Coumadin Discharge Instructions                         Chronic Kindey Disease Education             Diabetes Discharge Instructions                                   [facility] Sign-Up     Activating your [facility] account is as easy as 1-2-3!     1) Visit [email or web address], select Sign Up Now, enter this activation code and your date of birth, then select Next.  [email or web address]  Expires: 5/11/2017 10:39 AM      2) Create a username and password to use when you visit [facility] in the future and select a security question in case you lose your password and select Next.    3) Enter your e-mail address and click Sign Up!    Additional Information  If you have questions, please e-mail [email or web address] or call [phone] to talk to our [facility] staff. Remember, [facility] is NOT to be used for urgent needs. For medical emergencies, dial 911.          [facility] complies with applicable Federal civil rights laws and does  not discriminate on the basis of race, color, national origin, age, disability, or sex.

## 2017-03-31 NOTE — ANESTHESIA POSTPROCEDURE EVALUATION
"Anesthesia Post Evaluation    Patient: Dariel Urbina    Procedure(s) Performed: Procedure(s) (LRB):  COLONOSCOPY (N/A)    Final Anesthesia Type: general  Patient location during evaluation: GI PACU  Patient participation: Yes- Able to Participate  Level of consciousness: awake and alert  Post-procedure vital signs: reviewed and stable  Pain management: adequate  Airway patency: patent  PONV status at discharge: No PONV  Anesthetic complications: no      Cardiovascular status: blood pressure returned to baseline  Respiratory status: unassisted  Hydration status: euvolemic  Follow-up not needed.        Visit Vitals    BP (!) 103/59 (BP Location: Left arm, Patient Position: Lying, BP Method: Automatic)    Pulse (!) 53    Temp 36.7 °C (98 °F)    Resp 16    Ht 5' 6" (1.676 m)    Wt 86.2 kg (190 lb)    SpO2 97%    BMI 30.67 kg/m2       Pain/Sanjeev Score: Pain Assessment Performed: Yes (3/31/2017 11:54 AM)  Presence of Pain: denies (3/31/2017 11:54 AM)  Sanjeev Score: 10 (3/31/2017 11:54 AM)      "

## 2017-03-31 NOTE — PROGRESS NOTES
I spoke to the pt's wife today to confirm his warfarin and lovenox dosing over the weekend.  He will follow the plan already given to him with his INR on 3/27.

## 2017-03-31 NOTE — DISCHARGE INSTRUCTIONS
Colonoscopy     A camera attached to a flexible tube with a viewing lens is used to take video pictures.     Colonoscopy is a test to view the inside of your lower digestive tract (colon and rectum). Sometimes it can show the last part of the small intestine (ileum). During the test, small pieces of tissue may be removed for testing. This is called a biopsy. Small growths, such as polyps, may also be removed.   Why is colonoscopy done?  The test is done to help look for colon cancer. And it can help find the source of abdominal pain, bleeding, and changes in bowel habits. It may be needed once a year, depending on factors such as your:  · Age  · Health history  · Family health history  · Symptoms  · Results from any prior colonoscopy  Risks and possible complications  These include:  · Bleeding               · A puncture or tear in the colon   · Risks of anesthesia  · A cancer lesion not being seen  Getting ready   To prepare for the test:  · Talk with your healthcare provider about the risks of the test (see below). Also ask your healthcare provider about alternatives to the test.  · Tell your healthcare provider about any medicines you take. Also tell him or her about any health conditions you may have.  · Make sure your rectum and colon are empty for the test. Follow the diet and bowel prep instructions exactly. If you dont, the test may need to be rescheduled.  · Plan for a friend or family member to drive you home after the test.     Colonoscopy provides an inside view of the entire colon.     You may discuss the results with your doctor right away or at a future visit.  During the test   The test is usually done in the hospital on an outpatient basis. This means you go home the same day. The procedure takes about 30 minutes. During that time:  · You are given relaxing (sedating) medicine through an IV line. You may be drowsy, or fully asleep.  · The healthcare provider will first give you a physical exam to  check for anal and rectal problems.  · Then the anus is lubricated and the scope inserted.  · If you are awake, you may have a feeling similar to needing to have a bowel movement. You may also feel pressure as air is pumped into the colon. Its OK to pass gas during the procedure.  · Biopsy, polyp removal, or other treatments may be done during the test.  After the test   You may have gas right after the test. It can help to try to pass it to help prevent later bloating. Your healthcare provider may discuss the results with you right away. Or you may need to schedule a follow-up visit to talk about the results. After the test, you can go back to your normal eating and other activities. You may be tired from the sedation and need to rest for a few hours.  Date Last Reviewed: 11/1/2016  © 5193-7512 [employer]. [address] [place] [address], [place] [address]. All rights reserved. This information is not intended as a substitute for professional medical care. Always follow your healthcare professional's instructions.

## 2017-03-31 NOTE — NURSING
Patient placed in bed Q - Admit.  VS as charted.  Pt denies pain, alert and oriented.  Waiting for 12 lead ekg.  Bed in low position with side rails up. Call light within reach.  All questions answered.  Pt. In NAD at this time.  Attached to BP, pulse ox, and 3 lead ekg.

## 2017-03-31 NOTE — ANESTHESIA RELEASE NOTE
Anesthesia Release from PACU note    Patient: Dariel Urbina    Procedure(s) Performed: Procedure(s) (LRB):  COLONOSCOPY (N/A)    Anesthesia type: general    Post pain: Adequate analgesia    Post assessment: no apparent anesthetic complications, tolerated procedure well and no evidence of recall    Last Vitals:   Vitals:    03/31/17 1154   BP: (!) 103/59   Pulse: (!) 53   Resp: 16   Temp:    SpO2: 97%       Post vital signs: stable    Level of consciousness: awake, alert  and oriented    Nausea/Vomiting: no nausea/no vomiting    Complications: none    Airway Patency: patent    Respiratory: unassisted    Cardiovascular: stable and blood pressure at baseline    Hydration: euvolemic

## 2017-03-31 NOTE — ANESTHESIA PREPROCEDURE EVALUATION
03/31/2017  Dariel Urbina is a 76 y.o., male.    [facility] Anesthesia Evaluation    I have reviewed the Patient Summary Reports.    I have reviewed the Nursing Notes.   I have reviewed the Medications.     Review of Systems  Anesthesia Hx:  No problems with previous Anesthesia  History of prior surgery of interest to airway management or planning: Denies Family Hx of Anesthesia complications.    Social:  Former Smoker, No Alcohol Use    Cardiovascular:   Exercise tolerance: poor Hypertension CAD  CABG/stent  Angina, with exertion LÓPEZ S/p AVR   Pulmonary:   Denies Asthma.  Denies Sleep Apnea.    Renal/:   Chronic Renal Disease, CRI    Hepatic/GI:   Denies GERD.    Endocrine:   Diabetes        Physical Exam  General:  Well nourished    Airway/Jaw/Neck:  Airway Findings: Mouth Opening: Normal Tongue: Normal  General Airway Assessment: Adult  Mallampati: II      Dental:  Dental Findings: In tact   Chest/Lungs:  Chest/Lungs Findings: Clear to auscultation, Normal Respiratory Rate     Heart/Vascular:  Heart Findings: Rate: Normal  Rhythm: Regular Rhythm        Mental Status:  Mental Status Findings:  Cooperative, Alert and Oriented         Anesthesia Plan  Type of Anesthesia, risks & benefits discussed:  Anesthesia Type:  general  Patient's Preference:   Intra-op Monitoring Plan: standard ASA monitors  Intra-op Monitoring Plan Comments:   Post Op Pain Control Plan:   Post Op Pain Control Plan Comments:   Induction:   IV  Beta Blocker:  Patient is on a Beta-Blocker and has received one dose within the past 24 hours (No further documentation required).       Informed Consent: Patient understands risks and agrees with Anesthesia plan.  Questions answered. Anesthesia consent signed with patient.  ASA Score: 3     Day of Surgery Review of History & Physical:    H&P update referred to the provider.     Anesthesia Plan  Notes: Will get 12 lead EKG for baseline prior to procedure because of his chronic stable angina and no baseline EKG        Ready For Surgery From Anesthesia Perspective.

## 2017-03-31 NOTE — TRANSFER OF CARE
"Anesthesia Transfer of Care Note    Patient: Dariel Urbina    Procedure(s) Performed: Procedure(s) (LRB):  COLONOSCOPY (N/A)    Patient location: PACU    Anesthesia Type: general    Transport from OR: Transported from OR on room air with adequate spontaneous ventilation    Post pain: adequate analgesia    Post assessment: no apparent anesthetic complications and tolerated procedure well    Post vital signs: stable    Level of consciousness: awake    Nausea/Vomiting: no nausea/vomiting    Complications: none          Last vitals:   Visit Vitals    BP (!) 159/69 (BP Location: Left arm, Patient Position: Lying, BP Method: Automatic)    Pulse (!) 52    Temp 36.4 °C (97.6 °F) (Oral)    Resp 14    Ht 5' 6" (1.676 m)    Wt 86.2 kg (190 lb)    SpO2 95%    BMI 30.67 kg/m2     "

## 2017-03-31 NOTE — H&P
Short Stay Endoscopy History and Physical    PCP - Devon Langston MD    Procedure - Colonoscopy  Sedation: GA    ASA - per anesthesia  Mallampati - per anesthesia  History of Anesthesia problems - no  Family history Anesthesia problems -  no     HPI:  This is a 76 y.o. male here for evaluation of : Diarrhea    Reflux - no  Dysphagia - no  Abdominal pain - no  Diarrhea - yes    ROS:  Constitutional: No fevers, chills, No weight loss  ENT: No allergies  CV: No chest pain  Pulm: No cough, No shortness of breath  Ophtho: No vision changes  GI: see HPI      Medical History:  has a past medical history of Bilateral carotid artery disease (2/9/2017); Bleeding; CAD (coronary atherosclerotic disease) (9/11/2012); Cataract; Chronic kidney disease; DM (diabetes mellitus) (9/26/2012); History of gout (9/26/2012); Hyperlipidemia; Hypertension; Mechanical heart valve present; Renal insufficiency (9/11/2012); and Type II or unspecified type diabetes mellitus without mention of complication, not stated as uncontrolled.    Surgical History:  has a past surgical history that includes Cardiac valuve replacement; Cardiac catheterization; Coronary artery bypass graft; Coronary angioplasty; Cardiac valve replacement; Spine surgery; Tonsillectomy; Adenoidectomy; Vasectomy; and Colon surgery.    Family History: family history includes Alcohol abuse in his father; Diabetes in his brother; Heart disease in his brother, father, mother, and sister; Heart failure in his brother, father, and mother; No Known Problems in his maternal aunt, maternal grandfather, maternal grandmother, maternal uncle, paternal aunt, paternal grandfather, paternal grandmother, paternal uncle, and sister. There is no history of Amblyopia, Blindness, Cancer, Cataracts, Glaucoma, Hypertension, Macular degeneration, Retinal detachment, Strabismus, Stroke, Thyroid disease, Anemia, Arrhythmia, Asthma, Clotting disorder, Fainting, Heart attack, Hyperlipidemia, Atrial Septal  Defect, or Melanoma.. Otherwise no colon cancer, inflammatory bowel disease, or GI malignancies.    Social History:  reports that he quit smoking about 36 years ago. He has a 20.00 pack-year smoking history. He has never used smokeless tobacco. He reports that he does not drink alcohol or use illicit drugs.    Review of patient's allergies indicates:  No Known Allergies    Medications:   Prescriptions Prior to Admission   Medication Sig Dispense Refill Last Dose    allopurinol (ZYLOPRIM) 100 MG tablet TAKE 1 TABLET ONE TIME DAILY 90 tablet 4 Past Week at Unknown time    atorvastatin (LIPITOR) 80 MG tablet    Past Week at Unknown time    fenofibrate micronized (LOFIBRA) 200 MG Cap Take 200 mg by mouth daily with breakfast.   Past Week at Unknown time    fosinopril (MONOPRIL) 40 MG tablet TAKE 1 TABLET ONE TIME DAILY 90 tablet 4 3/31/2017 at Unknown time    glimepiride (AMARYL) 1 MG tablet Take 1 tablet (1 mg total) by mouth every morning. 90 tablet 2 Past Week at Unknown time    hydrALAZINE (APRESOLINE) 25 MG tablet Take 1 tablet (25 mg total) by mouth 3 (three) times daily. 279 tablet 4 3/31/2017 at Unknown time    isosorbide mononitrate (IMDUR) 120 MG 24 hr tablet Take 1 tablet (120 mg total) by mouth once daily. 90 tablet 4 3/31/2017 at Unknown time    metoprolol tartrate (LOPRESSOR) 50 MG tablet Take 1 tablet (50 mg total) by mouth once daily. 90 tablet 3 3/31/2017 at Unknown time    omega-3 acid ethyl esters (LOVAZA) 1 gram capsule Take 2 capsules (2 g total) by mouth 2 (two) times daily. 360 capsule 5 Past Week at Unknown time    furosemide (LASIX) 20 MG tablet TAKE 1 TABLET ONE TIME DAILY 90 tablet 4 Taking    nitroGLYCERIN (NITROSTAT) 0.4 MG SL tablet Place 1 tablet (0.4 mg total) under the tongue every 5 (five) minutes as needed. As needed for chest pain 90 tablet 4 Taking    warfarin (COUMADIN) 5 MG tablet Take 1 tablet (5 mg total) by mouth Daily. Current Dose (7.5 mg on Mon, Wed, Fri; 5 mg all  other days) 120 tablet 3 3/27/2017       Objective Findings:    Vital Signs: Per nursing notes.    Physical Exam:  General Appearance: Well appearing in no acute distress  Head:   Normocephalic, without obvious abnormality  Eyes:    No scleral icterus  Airway: Open  Neck: No restriction in mobility  Lungs: CTA bilaterally in anterior and posterior fields, no wheezes, no crackles.  Heart:  Regular rate and rhythm, S1, S2 normal, no murmurs heard  Abdomen: Soft, non tender, non distended      Labs:  Lab Results   Component Value Date    WBC 5.72 07/29/2016    HGB 12.4 (L) 07/29/2016    HCT 37.9 (L) 07/29/2016     07/29/2016    CHOL 126 02/02/2017    TRIG 149 02/02/2017    HDL 34 (L) 02/02/2017    ALT 19 02/02/2017    AST 27 02/02/2017     03/31/2017    K 5.7 (H) 03/31/2017     (H) 03/31/2017    CREATININE 1.7 (H) 03/31/2017    BUN 31 (H) 03/31/2017    CO2 24 03/31/2017    TSH 3.029 02/02/2017    PSA 0.35 07/27/2012    INR 1.6 (H) 03/31/2017    HGBA1C 5.8 02/02/2017         I have explained the risks and benefits of endoscopy procedures to the patient including but not limited to bleeding, perforation, infection, and death.    Thank you so much for allowing me to participate in the care of Dariel Raymond MD

## 2017-04-03 ENCOUNTER — ANTI-COAG VISIT (OUTPATIENT)
Dept: CARDIOLOGY | Facility: CLINIC | Age: 77
End: 2017-04-03
Payer: MEDICARE

## 2017-04-03 DIAGNOSIS — Z79.01 LONG TERM CURRENT USE OF ANTICOAGULANT THERAPY: ICD-10-CM

## 2017-04-03 DIAGNOSIS — Z95.2 H/O MECHANICAL AORTIC VALVE REPLACEMENT: ICD-10-CM

## 2017-04-03 LAB — INR PPP: 1.5 (ref 2–3)

## 2017-04-03 PROCEDURE — 85610 PROTHROMBIN TIME: CPT | Mod: QW,S$GLB,,

## 2017-04-03 PROCEDURE — 99211 OFF/OP EST MAY X REQ PHY/QHP: CPT | Mod: 25,S$GLB,,

## 2017-04-03 NOTE — PROGRESS NOTES
Pt reports holding dose 3/31 post cscope but resumed boosted dose. INR lower than day of procedure. Will boost again and continue lovneox until 4/4. INR 4/6 as pt is traveling and can not return sooner. I have reviewed the student's initial findings and agree with their assessment. I have personally spoken with and assessed the patient in clinic to devise care plan.

## 2017-04-06 ENCOUNTER — ANTI-COAG VISIT (OUTPATIENT)
Dept: CARDIOLOGY | Facility: CLINIC | Age: 77
End: 2017-04-06
Payer: MEDICARE

## 2017-04-06 DIAGNOSIS — Z95.2 H/O MECHANICAL AORTIC VALVE REPLACEMENT: ICD-10-CM

## 2017-04-06 DIAGNOSIS — Z79.01 LONG TERM CURRENT USE OF ANTICOAGULANT THERAPY: Primary | ICD-10-CM

## 2017-04-06 LAB — INR PPP: 1.9 (ref 2–3)

## 2017-04-06 PROCEDURE — 99211 OFF/OP EST MAY X REQ PHY/QHP: CPT | Mod: 25,S$GLB,,

## 2017-04-06 PROCEDURE — 85610 PROTHROMBIN TIME: CPT | Mod: QW,S$GLB,, | Performed by: INTERNAL MEDICINE

## 2017-04-06 NOTE — PROGRESS NOTES
Today was a quick follow up appointment due to subtherapeutic INR 4/3.  INR remains slightly subtherapeutic but improving.  Patient reports last dose lovenox 4/5 in the morning.  He denies other changes.  Continue past stable warfarin regimen which will provide slightly higher weekly dose.  As INR was stable prior to interruption in therapy, repeat  INR 3-4 weeks.  If INR remains therapeutic on f/u, can likely resume prior 6-7 week monitoring interval.  Patient advised to contact clinic with any changes, questions, or concerns.  I have reviewed the student's initial findings and agree with their assessment.  I have personally spoken with and assessed the patient in clinic to devise care plan.

## 2017-04-07 ENCOUNTER — TELEPHONE (OUTPATIENT)
Dept: GASTROENTEROLOGY | Facility: CLINIC | Age: 77
End: 2017-04-07

## 2017-04-07 ENCOUNTER — TELEPHONE (OUTPATIENT)
Dept: ENDOSCOPY | Facility: HOSPITAL | Age: 77
End: 2017-04-07

## 2017-04-07 NOTE — TELEPHONE ENCOUNTER
----- Message from Bruno Raymond MD sent at 4/7/2017 10:43 AM CDT -----  Colon biopsies were normal.

## 2017-04-15 ENCOUNTER — TELEPHONE (OUTPATIENT)
Dept: GASTROENTEROLOGY | Facility: CLINIC | Age: 77
End: 2017-04-15

## 2017-05-03 ENCOUNTER — ANTI-COAG VISIT (OUTPATIENT)
Dept: CARDIOLOGY | Facility: CLINIC | Age: 77
End: 2017-05-03
Payer: MEDICARE

## 2017-05-03 ENCOUNTER — TELEPHONE (OUTPATIENT)
Dept: CARDIOLOGY | Facility: CLINIC | Age: 77
End: 2017-05-03

## 2017-05-03 ENCOUNTER — OFFICE VISIT (OUTPATIENT)
Dept: CARDIOLOGY | Facility: CLINIC | Age: 77
End: 2017-05-03
Payer: MEDICARE

## 2017-05-03 ENCOUNTER — HOSPITAL ENCOUNTER (OUTPATIENT)
Dept: CARDIOLOGY | Facility: CLINIC | Age: 77
Discharge: HOME | End: 2017-05-03
Payer: MEDICARE

## 2017-05-03 VITALS
BODY MASS INDEX: 29.94 KG/M2 | HEIGHT: 66 IN | DIASTOLIC BLOOD PRESSURE: 64 MMHG | OXYGEN SATURATION: 99 % | WEIGHT: 186.31 LBS | SYSTOLIC BLOOD PRESSURE: 166 MMHG | HEART RATE: 64 BPM

## 2017-05-03 DIAGNOSIS — D64.9 ANEMIA, UNSPECIFIED TYPE: Primary | ICD-10-CM

## 2017-05-03 DIAGNOSIS — Z79.01 LONG TERM CURRENT USE OF ANTICOAGULANT THERAPY: Primary | ICD-10-CM

## 2017-05-03 DIAGNOSIS — I25.118 CORONARY ARTERY DISEASE OF NATIVE ARTERY OF NATIVE HEART WITH STABLE ANGINA PECTORIS: ICD-10-CM

## 2017-05-03 DIAGNOSIS — Z95.2 H/O MECHANICAL AORTIC VALVE REPLACEMENT: Primary | ICD-10-CM

## 2017-05-03 DIAGNOSIS — I10 ESSENTIAL HYPERTENSION: ICD-10-CM

## 2017-05-03 DIAGNOSIS — E11.40 TYPE 2 DIABETES, CONTROLLED, WITH NEUROPATHY: ICD-10-CM

## 2017-05-03 DIAGNOSIS — I73.9 PVD (PERIPHERAL VASCULAR DISEASE): ICD-10-CM

## 2017-05-03 DIAGNOSIS — I25.10 CORONARY ARTERY DISEASE, ANGINA PRESENCE UNSPECIFIED, UNSPECIFIED VESSEL OR LESION TYPE, UNSPECIFIED WHETHER NATIVE OR TRANSPLANTED HEART: ICD-10-CM

## 2017-05-03 DIAGNOSIS — I25.10 CORONARY ARTERY DISEASE, ANGINA PRESENCE UNSPECIFIED, UNSPECIFIED VESSEL OR LESION TYPE, UNSPECIFIED WHETHER NATIVE OR TRANSPLANTED HEART: Primary | ICD-10-CM

## 2017-05-03 DIAGNOSIS — N28.9 RENAL INSUFFICIENCY: ICD-10-CM

## 2017-05-03 DIAGNOSIS — I71.40 AAA (ABDOMINAL AORTIC ANEURYSM) WITHOUT RUPTURE: ICD-10-CM

## 2017-05-03 DIAGNOSIS — Z95.2 H/O MECHANICAL AORTIC VALVE REPLACEMENT: ICD-10-CM

## 2017-05-03 LAB
AORTIC VALVE REGURGITATION: ABNORMAL
CTP QC/QA: YES
ESTIMATED PA SYSTOLIC PRESSURE: 53.97
INR PPP: 2.9 (ref 2–3)
MITRAL VALVE MOBILITY: NORMAL
MITRAL VALVE REGURGITATION: ABNORMAL
PROTHROMBIN TIME, POC: NORMAL (ref 2–3)
RETIRED EF AND QEF - SEE NOTES: 65 (ref 55–65)
TRICUSPID VALVE REGURGITATION: ABNORMAL

## 2017-05-03 PROCEDURE — 1126F AMNT PAIN NOTED NONE PRSNT: CPT | Mod: S$GLB,,, | Performed by: INTERNAL MEDICINE

## 2017-05-03 PROCEDURE — 99211 OFF/OP EST MAY X REQ PHY/QHP: CPT | Mod: 25,S$GLB,,

## 2017-05-03 PROCEDURE — 3066F NEPHROPATHY DOC TX: CPT | Mod: S$GLB,,, | Performed by: INTERNAL MEDICINE

## 2017-05-03 PROCEDURE — 3044F HG A1C LEVEL LT 7.0%: CPT | Mod: S$GLB,,, | Performed by: INTERNAL MEDICINE

## 2017-05-03 PROCEDURE — 1160F RVW MEDS BY RX/DR IN RCRD: CPT | Mod: S$GLB,,, | Performed by: INTERNAL MEDICINE

## 2017-05-03 PROCEDURE — 99999 PR PBB SHADOW E&M-EST. PATIENT-LVL IV: CPT | Mod: PBBFAC,,, | Performed by: INTERNAL MEDICINE

## 2017-05-03 PROCEDURE — 85610 PROTHROMBIN TIME: CPT | Mod: QW,S$GLB,,

## 2017-05-03 PROCEDURE — 93306 TTE W/DOPPLER COMPLETE: CPT | Mod: S$GLB,,, | Performed by: INTERNAL MEDICINE

## 2017-05-03 PROCEDURE — 1159F MED LIST DOCD IN RCRD: CPT | Mod: S$GLB,,, | Performed by: INTERNAL MEDICINE

## 2017-05-03 PROCEDURE — 3078F DIAST BP <80 MM HG: CPT | Mod: S$GLB,,, | Performed by: INTERNAL MEDICINE

## 2017-05-03 PROCEDURE — 99499 UNLISTED E&M SERVICE: CPT | Mod: S$GLB,,, | Performed by: INTERNAL MEDICINE

## 2017-05-03 PROCEDURE — 99213 OFFICE O/P EST LOW 20 MIN: CPT | Mod: S$GLB,,, | Performed by: INTERNAL MEDICINE

## 2017-05-03 PROCEDURE — 3077F SYST BP >= 140 MM HG: CPT | Mod: S$GLB,,, | Performed by: INTERNAL MEDICINE

## 2017-05-03 RX ORDER — NIFEDIPINE 30 MG/1
30 TABLET, EXTENDED RELEASE ORAL DAILY
Qty: 30 TABLET | Refills: 11 | Status: ON HOLD | OUTPATIENT
Start: 2017-05-03 | End: 2018-03-28

## 2017-05-03 RX ORDER — FERROUS SULFATE 325(65) MG
325 TABLET, DELAYED RELEASE (ENTERIC COATED) ORAL DAILY
COMMUNITY
End: 2017-05-03 | Stop reason: SDUPTHER

## 2017-05-03 NOTE — TELEPHONE ENCOUNTER
Called results of bloodwork and echo to patient and wife. Dr. Vences gave orders for patient to start Ferrous sulfate 325mg PO Qday. Prescription sent to pharmacy. Patient and wife given these instructions. Verbalized understanding.

## 2017-05-03 NOTE — MR AVS SNAPSHOT
[facility]  [address]  [facility] [address]  Phone: [phone]                  Dariel Devon Urbina   5/3/2017 8:20 AM   Office Visit    Description:  Male : 1940   Provider:  José Miguel Vences MD   Department:  [facility]           Reason for Visit     Coronary Artery Disease           Diagnoses this Visit        Comments    H/O mechanical aortic valve replacement    -  Primary     Coronary artery disease of native artery of native heart with stable angina pectoris         Essential hypertension         AAA (abdominal aortic aneurysm) without rupture         Renal insufficiency         PVD (peripheral vascular disease)         Type 2 diabetes, controlled, with neuropathy                To Do List           Future Appointments        Provider Department Dept Phone    5/3/2017 8:40 AM LAB, APPOINT[place] [facility] [phone]    5/3/2017 9:15 AM Reyna Suarez, PharmD [facility] [phone]    5/3/2017 10:15 AM ECHO, [facility] - Echo/Stress Lab [phone]    2017 7:00 AM LAB, SAME DAY [facility] [phone]    2017 8:00 AM Devon Langston Jr., MD [facility] - Internal Medicine [phone]      Goals (5 Years of Data)     None      [facility]     [facility] Care [facility] - 24/7 Assistance  Unless otherwise directed by your provider, please contact [facility], our nurse care line that is available for 24/ assistance.     Registered nurses in the [facility] provide: appointment scheduling, clinical advisement, health education, and other advisory services.  Call: [phone] (toll free)               Medications           Message regarding Medications     Verify the changes and/or additions to your medication regime listed below are the same as discussed with your clinician today.  If any of these changes or additions are incorrect,  "please notify your healthcare provider.        STOP taking these medications     hydrALAZINE (APRESOLINE) 25 MG tablet Take 1 tablet (25 mg total) by mouth 3 (three) times daily.           Verify that the below list of medications is an accurate representation of the medications you are currently taking.  If none reported, the list may be blank. If incorrect, please contact your healthcare provider. Carry this list with you in case of emergency.           Current Medications     allopurinol (ZYLOPRIM) 100 MG tablet TAKE 1 TABLET ONE TIME DAILY    atorvastatin (LIPITOR) 80 MG tablet     fenofibrate micronized (LOFIBRA) 200 MG Cap Take 200 mg by mouth daily with breakfast.    fosinopril (MONOPRIL) 40 MG tablet TAKE 1 TABLET ONE TIME DAILY    furosemide (LASIX) 20 MG tablet TAKE 1 TABLET ONE TIME DAILY    glimepiride (AMARYL) 1 MG tablet Take 1 tablet (1 mg total) by mouth every morning.    isosorbide mononitrate (IMDUR) 120 MG 24 hr tablet Take 1 tablet (120 mg total) by mouth once daily.    metoprolol tartrate (LOPRESSOR) 50 MG tablet Take 1 tablet (50 mg total) by mouth once daily.    omega-3 acid ethyl esters (LOVAZA) 1 gram capsule Take 2 capsules (2 g total) by mouth 2 (two) times daily.    warfarin (COUMADIN) 5 MG tablet Take 1 tablet (5 mg total) by mouth Daily. Current Dose (7.5 mg on Mon, Wed, Fri; 5 mg all other days)    nitroGLYCERIN (NITROSTAT) 0.4 MG SL tablet Place 1 tablet (0.4 mg total) under the tongue every 5 (five) minutes as needed. As needed for chest pain           Clinical Reference Information           Your Vitals Were     BP Pulse Height Weight SpO2 BMI    166/64 (BP Location: Right arm, Patient Position: Sitting, BP Method: Automatic) 64 5' 6" (1.676 m) 84.5 kg (186 lb 4.6 oz) 99% 30.07 kg/m2      Blood Pressure          Most Recent Value    BP  (!)  166/64      Allergies as of 5/3/2017     No Known Allergies      Immunizations Administered on Date of Encounter - 5/3/2017     None    "   [facility] Sign-Up     Activating your [facility] account is as easy as 1-2-3!     1) Visit [email or web address], select Sign Up Now, enter this activation code and your date of birth, then select Next.  [email or web address]  Expires: 5/11/2017 10:39 AM      2) Create a username and password to use when you visit [facility] in the future and select a security question in case you lose your password and select Next.    3) Enter your e-mail address and click Sign Up!    Additional Information  If you have questions, please e-mail [email or web address] or call [phone] to talk to our [facility] staff. Remember, [facility] is NOT to be used for urgent needs. For medical emergencies, dial 911.         Language Assistance Services     ATTENTION: Language assistance services are available, free of charge. Please call [phone].      ATENCIÓN: Si habla daphneañol, tiene a guevara disposición servicios gratuitos de asistencia lingüística. Llame al [phone].     OLIVE Ý: N?u b?n nói Ti?ng Vi?t, có các d?ch v? h? tr? ngôn ng? mi?n phí dành cho b?n. G?i s? [phone].         [facility] complies with applicable Federal civil rights laws and does not discriminate on the basis of race, color, national origin, age, disability, or sex.

## 2017-05-03 NOTE — PROGRESS NOTES
Subjective:    Patient ID:  Dariel Urbina is a 76 y.o. male who presents for follow-up of Coronary Artery Disease      HPI He is having increasing chest pain that sometimes responds to sl NTG. Chronic diarrhea since started hydralazine.    Review of Systems   Constitution: Positive for weight loss. Negative for chills and fever.   Cardiovascular: Positive for chest pain. Negative for irregular heartbeat, orthopnea and palpitations.   Musculoskeletal: Positive for back pain.   Gastrointestinal: Positive for diarrhea. Negative for abdominal pain and nausea.        Objective:    Physical Exam   Constitutional: He is oriented to person, place, and time. He appears well-developed and well-nourished. No distress.   HENT:   Head: Normocephalic.   Neck: No JVD present.   Cardiovascular: Normal rate.    Murmur heard.  Pulmonary/Chest: No respiratory distress. He has no wheezes. He has no rales. He exhibits no tenderness.   Neurological: He is alert and oriented to person, place, and time.   Skin: Skin is warm and dry. No rash noted. He is not diaphoretic. No erythema.   Psychiatric: He has a normal mood and affect. His behavior is normal. Judgment and thought content normal.         Assessment:       1. H/O mechanical aortic valve replacement    2. Coronary artery disease of native artery of native heart with stable angina pectoris    3. Essential hypertension    4. AAA (abdominal aortic aneurysm) without rupture    5. Renal insufficiency    6. PVD (peripheral vascular disease)    7. Type 2 diabetes, controlled, with neuropathy         Plan:    1. See changes.

## 2017-05-04 RX ORDER — FERROUS SULFATE 325(65) MG
325 TABLET, DELAYED RELEASE (ENTERIC COATED) ORAL DAILY
Qty: 90 TABLET | Refills: 4 | COMMUNITY
Start: 2017-05-04 | End: 2020-01-14

## 2017-05-24 ENCOUNTER — LAB VISIT (OUTPATIENT)
Dept: LAB | Facility: HOSPITAL | Age: 77
End: 2017-05-24
Attending: INTERNAL MEDICINE
Payer: MEDICARE

## 2017-05-24 ENCOUNTER — ANTI-COAG VISIT (OUTPATIENT)
Dept: CARDIOLOGY | Facility: CLINIC | Age: 77
End: 2017-05-24
Payer: MEDICARE

## 2017-05-24 DIAGNOSIS — N18.30 TYPE 2 DIABETES MELLITUS WITH STAGE 3 CHRONIC KIDNEY DISEASE, WITHOUT LONG-TERM CURRENT USE OF INSULIN: ICD-10-CM

## 2017-05-24 DIAGNOSIS — Z95.2 H/O MECHANICAL AORTIC VALVE REPLACEMENT: ICD-10-CM

## 2017-05-24 DIAGNOSIS — Z79.01 LONG TERM CURRENT USE OF ANTICOAGULANT THERAPY: Primary | ICD-10-CM

## 2017-05-24 DIAGNOSIS — E11.22 TYPE 2 DIABETES MELLITUS WITH STAGE 3 CHRONIC KIDNEY DISEASE, WITHOUT LONG-TERM CURRENT USE OF INSULIN: ICD-10-CM

## 2017-05-24 LAB
ALBUMIN SERPL BCP-MCNC: 3.4 G/DL
ALP SERPL-CCNC: 102 U/L
ALT SERPL W/O P-5'-P-CCNC: 20 U/L
ANION GAP SERPL CALC-SCNC: 5 MMOL/L
AST SERPL-CCNC: 25 U/L
BILIRUB SERPL-MCNC: 0.4 MG/DL
BUN SERPL-MCNC: 39 MG/DL
CALCIUM SERPL-MCNC: 9.7 MG/DL
CHLORIDE SERPL-SCNC: 113 MMOL/L
CO2 SERPL-SCNC: 22 MMOL/L
CREAT SERPL-MCNC: 1.5 MG/DL
EST. GFR  (AFRICAN AMERICAN): 51.9 ML/MIN/1.73 M^2
EST. GFR  (NON AFRICAN AMERICAN): 44.9 ML/MIN/1.73 M^2
ESTIMATED AVG GLUCOSE: 117 MG/DL
GLUCOSE SERPL-MCNC: 73 MG/DL
HBA1C MFR BLD HPLC: 5.7 %
INR PPP: 3.2 (ref 2–3)
POTASSIUM SERPL-SCNC: 5.4 MMOL/L
PROT SERPL-MCNC: 7.3 G/DL
SODIUM SERPL-SCNC: 140 MMOL/L

## 2017-05-24 PROCEDURE — 99211 OFF/OP EST MAY X REQ PHY/QHP: CPT | Mod: S$GLB,,,

## 2017-05-24 PROCEDURE — 85610 PROTHROMBIN TIME: CPT | Mod: QW,S$GLB,, | Performed by: INTERNAL MEDICINE

## 2017-05-24 NOTE — PROGRESS NOTES
INR slightly elevated without overt complication.  As noted at last appointment, patient reported recent illness/flu.  He states he has felt back to his normal self for about the past week but reports weight loss during illness.  Change in weight and illness are likely contributing to changing warfarin dose requirements.  Lower then decrease weekly warfarin regimen.  May need to resume higher weekly dose if patient regains weight in future.  Patient advised to contact clinic with any changes, questions, or concerns.

## 2017-06-05 ENCOUNTER — OFFICE VISIT (OUTPATIENT)
Dept: INTERNAL MEDICINE | Facility: CLINIC | Age: 77
End: 2017-06-05
Payer: MEDICARE

## 2017-06-05 VITALS
SYSTOLIC BLOOD PRESSURE: 122 MMHG | HEART RATE: 55 BPM | OXYGEN SATURATION: 97 % | DIASTOLIC BLOOD PRESSURE: 60 MMHG | HEIGHT: 66 IN | BODY MASS INDEX: 29.9 KG/M2 | WEIGHT: 186.06 LBS

## 2017-06-05 DIAGNOSIS — Z90.49 HISTORY OF COLON RESECTION: ICD-10-CM

## 2017-06-05 DIAGNOSIS — I73.9 PVD (PERIPHERAL VASCULAR DISEASE): Primary | ICD-10-CM

## 2017-06-05 DIAGNOSIS — Z98.890 HISTORY OF AORTIC VALVE REPAIR: ICD-10-CM

## 2017-06-05 DIAGNOSIS — R19.7 DIARRHEA, UNSPECIFIED TYPE: ICD-10-CM

## 2017-06-05 DIAGNOSIS — E78.5 HYPERLIPIDEMIA, UNSPECIFIED HYPERLIPIDEMIA TYPE: ICD-10-CM

## 2017-06-05 DIAGNOSIS — I77.9 BILATERAL CAROTID ARTERY DISEASE: ICD-10-CM

## 2017-06-05 DIAGNOSIS — Z86.79 HISTORY OF AORTIC VALVE REPAIR: ICD-10-CM

## 2017-06-05 DIAGNOSIS — I10 ESSENTIAL HYPERTENSION: ICD-10-CM

## 2017-06-05 DIAGNOSIS — I25.119 ATHEROSCLEROSIS OF NATIVE CORONARY ARTERY OF NATIVE HEART WITH ANGINA PECTORIS: ICD-10-CM

## 2017-06-05 DIAGNOSIS — Z87.39 HISTORY OF GOUT: ICD-10-CM

## 2017-06-05 DIAGNOSIS — E11.51 TYPE 2 DIABETES MELLITUS WITH DIABETIC PERIPHERAL ANGIOPATHY WITHOUT GANGRENE, WITHOUT LONG-TERM CURRENT USE OF INSULIN: ICD-10-CM

## 2017-06-05 PROCEDURE — 3066F NEPHROPATHY DOC TX: CPT | Mod: S$GLB,,, | Performed by: INTERNAL MEDICINE

## 2017-06-05 PROCEDURE — 3044F HG A1C LEVEL LT 7.0%: CPT | Mod: S$GLB,,, | Performed by: INTERNAL MEDICINE

## 2017-06-05 PROCEDURE — 99499 UNLISTED E&M SERVICE: CPT | Mod: S$GLB,,, | Performed by: INTERNAL MEDICINE

## 2017-06-05 PROCEDURE — 99999 PR PBB SHADOW E&M-EST. PATIENT-LVL IV: CPT | Mod: PBBFAC,,, | Performed by: INTERNAL MEDICINE

## 2017-06-05 PROCEDURE — 99214 OFFICE O/P EST MOD 30 MIN: CPT | Mod: S$GLB,,, | Performed by: INTERNAL MEDICINE

## 2017-06-05 PROCEDURE — 1126F AMNT PAIN NOTED NONE PRSNT: CPT | Mod: S$GLB,,, | Performed by: INTERNAL MEDICINE

## 2017-06-05 PROCEDURE — 1159F MED LIST DOCD IN RCRD: CPT | Mod: S$GLB,,, | Performed by: INTERNAL MEDICINE

## 2017-06-05 NOTE — PROGRESS NOTES
Patient ID: Dariel Urbina is a 76 y.o. male.    Chief Complaint: Follow-up    HPI    77 yo M here for follow up appointment for multiple medical problems.      Diabetes mellitus type 2. Last A1c was 5.7 -- . He was put on amaryl 1mg last visit. Weight is down 7 # since last time .  . Weight today is 186 # lbs. Patient denies polyuria, polydipsia, visual disturbances.         Hyperlipidemia. On lipitor 40 mg , lovaza. And Fenofibrate --Recent lipid panel showed CHol 126, Hdl 34, LDL 62.2, and . He says he is taking all his cholesterol meds.         HTN. On monopril, lasix, imdur, lopressor. Pt does not measure BPs at home. BP today is 122/60. . There is some discussion about stopping the ACE due to hyperkalemia. K=5.6.  He saw nephrology in August.          CKD stage III. Pt saw Nephrology in 3/2017-- than note was reviewed. Cr back to baseline of 1.5.  His potasium is   5.4. He has been instructed on a a low potasium diet by nephrology, but is not following one.  He continues on his ACE- fosinopril.    BP has been well  controled at home .  Possible secondary hyperparathyroidism (- in July 2016) ). Hyperkalemic, counseled on low potassium diet.         H/o partial colon resection due to diverticulosis. He has been having diarrhea.  2 weeks- has gotten better  For last 2 days- watery worse right after eating.  NO N/V.  No recent travel.  No sick contacts.  NO recent antibiotics recently.       .    H/o CAD s/p CABG 2002, multiple stents, aortic valve replacement on coumadin. NO chest pain . He saw Dr. Vences In recently and that note was reviewed--  . He has LÓPEZ but this is chronic and some left sided claudication.      AAA- last us Showed 3.35 cm AAA- largest supra renal area.             H/o gout. On allopurinol. Last flare was 12 mo ago in left foot. He remains on Allopurinol.      Valvular heart disease- with mechanical AV, some MVR and some TVR-- NO He has jazzy eCP with walkin gout to mail box  and his has discussed this with cards.  Reviewed recent PET stress test.   SOB with walking 50 feet- unchanged since seeing cards.  He also has some chest pain with carrying weight- he has discussed this with cards-- it is not changing.         Review of Systems    Constitutional: Negative for fever, chills and unexpected weight change.    HENT: Negative for congestion, rhinorrhea and sinus pressure.    Eyes: Negative for visual disturbance.    Respiratory: He Has some SOB, but this is unchanged. He can mow the lawn with walk behind mower.    This has not changed since last visit.    Cardiovascular:as above.  .    Gastrointestinal: Negative for nausea, vomiting, abdominal pain, and constipation.  He does have some diarrhea-  - as above.    Genitourinary: Negative for dysuria, urgency and difficulty urinating.    Musculoskeletal: He reports his back- that was bothering him last vsiit- is not bothering him recently.    Skin: Negative.    Neurological: Negative for dizziness, syncope and headaches.    Hematological: Negative.    Psychiatric/Behavioral: Negative.    All other systems reviewed and are negative.    Objective:      Physical Exam   Constitutional: He is oriented to person, place, and time. He appears well-developed and well-nourished. No distress.    HENT:    Head: Normocephalic and atraumatic.    Mouth/Throat: Oropharynx is clear and moist. No oropharyngeal exudate.    Eyes: Conjunctivae and EOM are normal.    Neck: Normal range of motion. Neck supple. No JVD present. No tracheal deviation present.    Cardiovascular: Regular rhythm and normal heart sounds. Exam reveals no gallop and no friction rub.    Systolic murmur heard.  Pulse is 50  Pulmonary/Chest: Effort normal and breath sounds normal. No respiratory distress. He has no wheezes. He has no rales.    Abdominal: Soft. Bowel sounds are normal. He exhibits no distension. There is no tenderness. There is no rebound.   Musculoskeletal: Normal range of  motion. He exhibits no edema and no tenderness.   Neurological: He is alert and oriented to person, place, and time.    Skin: Skin is warm and dry. No rash noted. He is not diaphoretic. No erythema.   Psychiatric: He has a normal mood and affect. His behavior is normal.    Foot exam- Protective Sensation (w/ 10 gram monofilament):  Right: Decreased  Left: Decreased     Visual Inspection:  Normal -  Bilateral     Pedal Pulses:   Right: Diminshed  Left: Diminshed     Posterior tibialis:   Right:Present  Left: Present     .ffot Decrease sensation to Monofilament test on both feet more distally.          Assessment:      1.   DM (diabetes mellitus)     2.   Hyperlipidemia     3.   Chronic kidney disease, stage III (moderate)     4.   History of colon resection     5.   Hypertension     6.   CAD (coronary atherosclerotic disease)     7.    8.  9. History of aortic valve repair    AAA  [facility]- on coumadin    Plan:      1. DM type 2 for stable-- Hgb A1c controled.  . - follow up in 6 months   2. HLD. Lipid panel better since last visit. On lipitor, lovaza.  lipitor to 80 mg a day.   3. HTN. Stable   4. CKD, stage III. Seeing nephrology. Counseled on importance of BP control. Cont BP meds. Discussed low potassium diet.    5. H/o partial colon resection. No complaints with N/V/D, constipation. c-scope due in 2020.    6. H/o CAD s/p CABG 2002, multiple stents, aortic valve replacement on coumadin. Cont coumadin and BP meds.  7. H/o gout.  Cont allopurinol.    8. hyperkalemia --this is better-- recheck in 4 months will need to follow  a low k diet  9. Needs to take  Imodium regularly.

## 2017-06-05 NOTE — MR AVS SNAPSHOT
[facility]  [address]  [facility] [address]  Phone: [phone]  Fax: [phone]                  Dariel Castanedajean   2017 8:00 AM   Appointment    Description:  Male : 1940   Provider:  Devon Langston Jr., MD   Department:  [facility]                To Do List           Future Appointments        Provider Department Dept Phone    2017 7:10 AM LAB, APPOINTMENT [place] [facility] [phone]    2017 8:00 AM Reyna Suarez, PharmD [facility] [phone]    2017 8:00 AM Devon Langston Jr., MD [facility] [phone]      Goals (5 Years of Data)     None      [facility]     [facility] Care [facility] -  Assistance  Unless otherwise directed by your provider, please contact [facility], our nurse care line that is available for  assistance.     Registered nurses in the [facility] provide: appointment scheduling, clinical advisement, health education, and other advisory services.  Call: [phone] (toll free)               Medications           Message regarding Medications     Verify the changes and/or additions to your medication regime listed below are the same as discussed with your clinician today.  If any of these changes or additions are incorrect, please notify your healthcare provider.             Verify that the below list of medications is an accurate representation of the medications you are currently taking.  If none reported, the list may be blank. If incorrect, please contact your healthcare provider. Carry this list with you in case of emergency.           Current Medications     allopurinol (ZYLOPRIM) 100 MG tablet TAKE 1 TABLET ONE TIME DAILY    atorvastatin (LIPITOR) 80 MG tablet     fenofibrate micronized (LOFIBRA) 200 MG Cap Take 200 mg by mouth daily with breakfast.    ferrous sulfate 325 (65 FE) MG EC tablet Take 1 tablet (325  mg total) by mouth once daily.    fosinopril (MONOPRIL) 40 MG tablet TAKE 1 TABLET ONE TIME DAILY    furosemide (LASIX) 20 MG tablet TAKE 1 TABLET ONE TIME DAILY    glimepiride (AMARYL) 1 MG tablet Take 1 tablet (1 mg total) by mouth every morning.    isosorbide mononitrate (IMDUR) 120 MG 24 hr tablet Take 1 tablet (120 mg total) by mouth once daily.    metoprolol tartrate (LOPRESSOR) 50 MG tablet Take 1 tablet (50 mg total) by mouth once daily.    nifedipine (PROCARDIA-XL) 30 MG (OSM) 24 hr tablet Take 1 tablet (30 mg total) by mouth once daily.    nitroGLYCERIN (NITROSTAT) 0.4 MG SL tablet Place 1 tablet (0.4 mg total) under the tongue every 5 (five) minutes as needed. As needed for chest pain    omega-3 acid ethyl esters (LOVAZA) 1 gram capsule Take 2 capsules (2 g total) by mouth 2 (two) times daily.    warfarin (COUMADIN) 5 MG tablet Take 1 tablet (5 mg total) by mouth Daily. Current Dose (7.5 mg on Mon, Wed, Fri; 5 mg all other days)           Clinical Reference Information           Allergies as of 6/5/2017     No Known Allergies      Immunizations Administered on Date of Encounter - 6/5/2017     None      [facility] Sign-Up     Activating your [facility] account is as easy as 1-2-3!     1) Visit [email or web address], select Sign Up Now, enter this activation code and your date of birth, then select Next.  [email or web address]  Expires: 6/29/2017 11:17 AM      2) Create a username and password to use when you visit [facility] in the future and select a security question in case you lose your password and select Next.    3) Enter your e-mail address and click Sign Up!    Additional Information  If you have questions, please e-mail [email or web address] or call [phone] to talk to our [facility] staff. Remember, [facility] is NOT to be used for urgent needs. For medical emergencies, dial 911.         Language Assistance Services     ATTENTION: Language assistance services are available, free of charge. Please call  [phone].      ATENCIÓN: Si habla español, tiene a guevara disposición servicios gratuitos de asistencia lingüística. Llame al [phone].     CHÚ Ý: N?u b?n nói Ti?ng Vi?t, có các d?ch v? h? tr? ngôn ng? mi?n phí dành cho b?n. G?i s? [phone].         [facility] complies with applicable Federal civil rights laws and does not discriminate on the basis of race, color, national origin, age, disability, or sex.

## 2017-06-13 RX ORDER — ATORVASTATIN CALCIUM 80 MG/1
TABLET, FILM COATED ORAL
Qty: 90 TABLET | Refills: 3 | Status: SHIPPED | OUTPATIENT
Start: 2017-06-13 | End: 2018-06-15 | Stop reason: SDUPTHER

## 2017-06-14 ENCOUNTER — ANTI-COAG VISIT (OUTPATIENT)
Dept: CARDIOLOGY | Facility: CLINIC | Age: 77
End: 2017-06-14
Payer: MEDICARE

## 2017-06-14 DIAGNOSIS — Z95.2 H/O MECHANICAL AORTIC VALVE REPLACEMENT: ICD-10-CM

## 2017-06-14 DIAGNOSIS — Z79.01 LONG TERM CURRENT USE OF ANTICOAGULANT THERAPY: ICD-10-CM

## 2017-06-14 LAB — INR PPP: 1.8 (ref 2–3)

## 2017-06-14 PROCEDURE — 85610 PROTHROMBIN TIME: CPT | Mod: QW,S$GLB,,

## 2017-06-14 PROCEDURE — 99211 OFF/OP EST MAY X REQ PHY/QHP: CPT | Mod: 25,S$GLB,,

## 2017-06-29 ENCOUNTER — ANTI-COAG VISIT (OUTPATIENT)
Dept: CARDIOLOGY | Facility: CLINIC | Age: 77
End: 2017-06-29
Payer: MEDICARE

## 2017-06-29 DIAGNOSIS — Z95.2 H/O MECHANICAL AORTIC VALVE REPLACEMENT: ICD-10-CM

## 2017-06-29 DIAGNOSIS — Z79.01 LONG TERM CURRENT USE OF ANTICOAGULANT THERAPY: ICD-10-CM

## 2017-06-29 LAB — INR PPP: 1.5 (ref 2–3)

## 2017-06-29 PROCEDURE — 85610 PROTHROMBIN TIME: CPT | Mod: QW,S$GLB,,

## 2017-06-29 PROCEDURE — 99211 OFF/OP EST MAY X REQ PHY/QHP: CPT | Mod: 25,S$GLB,,

## 2017-06-29 NOTE — PROGRESS NOTES
Patient confirms dose. Denies any changes to alter INR. Will increase further. I have reviewed the student's initial findings and agree with their assessment. I have personally spoken with and assessed the patient in clinic to devise care plan.

## 2017-07-07 ENCOUNTER — ANTI-COAG VISIT (OUTPATIENT)
Dept: CARDIOLOGY | Facility: CLINIC | Age: 77
End: 2017-07-07
Payer: MEDICARE

## 2017-07-07 DIAGNOSIS — Z79.01 LONG TERM CURRENT USE OF ANTICOAGULANT THERAPY: ICD-10-CM

## 2017-07-07 DIAGNOSIS — Z95.2 H/O MECHANICAL AORTIC VALVE REPLACEMENT: ICD-10-CM

## 2017-07-07 LAB — INR PPP: 1.9 (ref 2–3)

## 2017-07-07 PROCEDURE — 99211 OFF/OP EST MAY X REQ PHY/QHP: CPT | Mod: 25,S$GLB,,

## 2017-07-07 PROCEDURE — 85610 PROTHROMBIN TIME: CPT | Mod: QW,S$GLB,,

## 2017-07-07 NOTE — PROGRESS NOTES
Patient confirms dose. INR improving on dose. Denies any further changes. Will continue dose and allow time for INR to respond to new dose. Follow up 7/20 to evaluate response.

## 2017-07-20 ENCOUNTER — ANTI-COAG VISIT (OUTPATIENT)
Dept: CARDIOLOGY | Facility: CLINIC | Age: 77
End: 2017-07-20
Payer: MEDICARE

## 2017-07-20 DIAGNOSIS — Z79.01 LONG TERM CURRENT USE OF ANTICOAGULANT THERAPY: ICD-10-CM

## 2017-07-20 DIAGNOSIS — Z95.2 H/O MECHANICAL AORTIC VALVE REPLACEMENT: ICD-10-CM

## 2017-07-20 LAB
CTP QC/QA: YES
INR PPP: 2 (ref 2–3)

## 2017-07-20 PROCEDURE — 85610 PROTHROMBIN TIME: CPT | Mod: QW,S$GLB,,

## 2017-08-16 ENCOUNTER — ANTI-COAG VISIT (OUTPATIENT)
Dept: CARDIOLOGY | Facility: CLINIC | Age: 77
End: 2017-08-16
Payer: MEDICARE

## 2017-08-16 DIAGNOSIS — Z95.2 H/O MECHANICAL AORTIC VALVE REPLACEMENT: ICD-10-CM

## 2017-08-16 DIAGNOSIS — Z79.01 LONG TERM CURRENT USE OF ANTICOAGULANT THERAPY: Primary | ICD-10-CM

## 2017-08-16 LAB — INR PPP: 2.1 (ref 2–3)

## 2017-08-16 PROCEDURE — 85610 PROTHROMBIN TIME: CPT | Mod: QW,S$GLB,,

## 2017-08-16 NOTE — PROGRESS NOTES
Quick follow up visit to the clinic ~ 3-4 weeks ago, INR previously therapeutic. INR therapeutic today with no overt complications. Patient denies any new signs/symptoms of bleeding or bruising. Patient denies any changes in diet/vitamin K intake or medications. Patient correctly stated weekly warfarin regimen with little prompting, and denies any issues with missed or doubled warfarin doses. Will maintain weekly warfarin regimen with no changes, with plans to follow-up in 3-4 weeks. Patient advised to contact clinic with any changes, questions, or concerns.

## 2017-09-13 ENCOUNTER — ANTI-COAG VISIT (OUTPATIENT)
Dept: CARDIOLOGY | Facility: CLINIC | Age: 77
End: 2017-09-13
Payer: MEDICARE

## 2017-09-13 DIAGNOSIS — Z95.2 H/O MECHANICAL AORTIC VALVE REPLACEMENT: ICD-10-CM

## 2017-09-13 DIAGNOSIS — Z79.01 LONG TERM CURRENT USE OF ANTICOAGULANT THERAPY: ICD-10-CM

## 2017-09-13 LAB — INR PPP: 2 (ref 2–3)

## 2017-09-13 PROCEDURE — 85610 PROTHROMBIN TIME: CPT | Mod: QW,S$GLB,,

## 2017-09-19 ENCOUNTER — OFFICE VISIT (OUTPATIENT)
Dept: NEPHROLOGY | Facility: CLINIC | Age: 77
End: 2017-09-19
Payer: MEDICARE

## 2017-09-19 VITALS
HEART RATE: 55 BPM | WEIGHT: 189.13 LBS | OXYGEN SATURATION: 96 % | HEIGHT: 66 IN | BODY MASS INDEX: 30.4 KG/M2 | SYSTOLIC BLOOD PRESSURE: 128 MMHG | DIASTOLIC BLOOD PRESSURE: 64 MMHG

## 2017-09-19 DIAGNOSIS — N18.30 CONTROLLED TYPE 2 DIABETES MELLITUS WITH STAGE 3 CHRONIC KIDNEY DISEASE, WITHOUT LONG-TERM CURRENT USE OF INSULIN: Primary | ICD-10-CM

## 2017-09-19 DIAGNOSIS — E87.5 HYPERKALEMIA: ICD-10-CM

## 2017-09-19 DIAGNOSIS — N25.81 HYPERPARATHYROIDISM DUE TO RENAL INSUFFICIENCY: ICD-10-CM

## 2017-09-19 DIAGNOSIS — D63.1 ANEMIA OF CHRONIC RENAL FAILURE, STAGE 3 (MODERATE): ICD-10-CM

## 2017-09-19 DIAGNOSIS — N18.30 ANEMIA OF CHRONIC RENAL FAILURE, STAGE 3 (MODERATE): ICD-10-CM

## 2017-09-19 DIAGNOSIS — I12.9 HYPERTENSIVE KIDNEY DISEASE WITH CHRONIC KIDNEY DISEASE, STAGE 1-4 OR UNSPECIFIED CHRONIC KIDNEY DISEASE: ICD-10-CM

## 2017-09-19 DIAGNOSIS — N18.30 CHRONIC KIDNEY DISEASE, STAGE III (MODERATE): ICD-10-CM

## 2017-09-19 DIAGNOSIS — E11.22 CONTROLLED TYPE 2 DIABETES MELLITUS WITH STAGE 3 CHRONIC KIDNEY DISEASE, WITHOUT LONG-TERM CURRENT USE OF INSULIN: Primary | ICD-10-CM

## 2017-09-19 PROCEDURE — 1159F MED LIST DOCD IN RCRD: CPT | Mod: S$GLB,,, | Performed by: INTERNAL MEDICINE

## 2017-09-19 PROCEDURE — 3074F SYST BP LT 130 MM HG: CPT | Mod: S$GLB,,, | Performed by: INTERNAL MEDICINE

## 2017-09-19 PROCEDURE — 1126F AMNT PAIN NOTED NONE PRSNT: CPT | Mod: S$GLB,,, | Performed by: INTERNAL MEDICINE

## 2017-09-19 PROCEDURE — 3008F BODY MASS INDEX DOCD: CPT | Mod: S$GLB,,, | Performed by: INTERNAL MEDICINE

## 2017-09-19 PROCEDURE — 99499 UNLISTED E&M SERVICE: CPT | Mod: S$GLB,,, | Performed by: INTERNAL MEDICINE

## 2017-09-19 PROCEDURE — 99213 OFFICE O/P EST LOW 20 MIN: CPT | Mod: S$GLB,,, | Performed by: INTERNAL MEDICINE

## 2017-09-19 PROCEDURE — 3078F DIAST BP <80 MM HG: CPT | Mod: S$GLB,,, | Performed by: INTERNAL MEDICINE

## 2017-09-19 PROCEDURE — 99999 PR PBB SHADOW E&M-EST. PATIENT-LVL III: CPT | Mod: PBBFAC,,, | Performed by: INTERNAL MEDICINE

## 2017-09-19 NOTE — PROGRESS NOTES
Subjective:       Patient ID: Dariel Urbina is a 77 y.o. White male who presents for follow-up evaluation of Chronic Kidney Disease    HPI      Mr. Urbina is a 77 year old man with past medical history of hypertension presenting for follow up of chronic kidney disease.  Patient reports chronic shortness of breath with exertion, otherwise denies any symptoms, no fever, chest pain, abdominal pain, diarrhea, dysuria/hematuria. He reports blood pressure usually well-controlled, less than 130 systolic.  He reports adherence to low potassium diet most of the time.    Review of Systems   Constitutional: Negative for appetite change, fatigue and fever.   Respiratory: Negative for cough and shortness of breath.    Cardiovascular: Negative for chest pain and leg swelling.   Gastrointestinal: Negative for abdominal pain, constipation, diarrhea, nausea and vomiting.   Genitourinary: Negative for dysuria, flank pain, frequency, hematuria and urgency.   Musculoskeletal: Negative for arthralgias, back pain and joint swelling.   Skin: Negative for rash.   Neurological: Negative for dizziness and light-headedness.   All other systems reviewed and are negative.      Objective:      Physical Exam   Constitutional: He appears well-developed and well-nourished.   Cardiovascular: Normal rate and regular rhythm.  Exam reveals no gallop and no friction rub.    No murmur heard.  Pulmonary/Chest: Effort normal and breath sounds normal. No respiratory distress. He has no wheezes. He has no rales.   Musculoskeletal: He exhibits no edema.   Neurological: He is alert.   Skin: Skin is warm and dry. No rash noted.   Vitals reviewed.      Assessment:       1. Controlled type 2 diabetes mellitus with stage 3 chronic kidney disease, without long-term current use of insulin    2. Chronic kidney disease, stage III (moderate)    3. Hyperkalemia    4. Hypertensive kidney disease with chronic kidney disease, stage 1-4 or unspecified chronic kidney  disease    5. Anemia of chronic renal failure, stage 3 (moderate)    6. Hyperparathyroidism due to renal insufficiency        Plan:      Mr. Urbina is a 77 year old man with past medical history of hypertension presenting for follow up of chronic kidney disease stage III.  Creatinine within baseline range, improved since last visit, will continue to trend for now. Stressed importance of blood pressure/sugar control to prevent any further progression of kidney disease, patient voiced understanding.  Encouraged weight loss and exercise as tolerated, along with fluid intake.   - Hypertension: blood pressure at goal, continue current meds   - Anemia: Hgb at goal, no indication for erythropoetin therapy  - Bone/mineral metabolism: patient with secondary hyperparathyroidism, will trend PTH  - Hyperkalemia: improved, again discussed low potassium diet, patient previously given handout.      Return to clinic 5-6 months with renal panel within 4 weeks, then with renal/heme panel, iron/TIBC/ferritin, urinalysis/culture, urine protein/creatinine ratio, PTH/VitD prior to next visit

## 2017-10-12 ENCOUNTER — LAB VISIT (OUTPATIENT)
Dept: LAB | Facility: HOSPITAL | Age: 77
End: 2017-10-12
Attending: INTERNAL MEDICINE
Payer: MEDICARE

## 2017-10-12 ENCOUNTER — ANTI-COAG VISIT (OUTPATIENT)
Dept: CARDIOLOGY | Facility: CLINIC | Age: 77
End: 2017-10-12

## 2017-10-12 DIAGNOSIS — Z79.01 LONG TERM CURRENT USE OF ANTICOAGULANT THERAPY: Primary | ICD-10-CM

## 2017-10-12 DIAGNOSIS — Z79.01 LONG TERM CURRENT USE OF ANTICOAGULANT THERAPY: ICD-10-CM

## 2017-10-12 DIAGNOSIS — Z95.2 H/O MECHANICAL AORTIC VALVE REPLACEMENT: ICD-10-CM

## 2017-10-12 LAB
INR PPP: 2.4
PROTHROMBIN TIME: 24.5 SEC

## 2017-10-12 PROCEDURE — 36415 COLL VENOUS BLD VENIPUNCTURE: CPT

## 2017-10-12 PROCEDURE — 85610 PROTHROMBIN TIME: CPT

## 2017-11-15 ENCOUNTER — LAB VISIT (OUTPATIENT)
Dept: LAB | Facility: HOSPITAL | Age: 77
End: 2017-11-15
Attending: INTERNAL MEDICINE
Payer: MEDICARE

## 2017-11-15 ENCOUNTER — ANTI-COAG VISIT (OUTPATIENT)
Dept: CARDIOLOGY | Facility: CLINIC | Age: 77
End: 2017-11-15

## 2017-11-15 DIAGNOSIS — Z95.2 H/O MECHANICAL AORTIC VALVE REPLACEMENT: ICD-10-CM

## 2017-11-15 DIAGNOSIS — I25.10 CORONARY ARTERY DISEASE, ANGINA PRESENCE UNSPECIFIED, UNSPECIFIED VESSEL OR LESION TYPE, UNSPECIFIED WHETHER NATIVE OR TRANSPLANTED HEART: ICD-10-CM

## 2017-11-15 DIAGNOSIS — Z79.01 LONG TERM CURRENT USE OF ANTICOAGULANT THERAPY: ICD-10-CM

## 2017-11-15 LAB
ALBUMIN SERPL BCP-MCNC: 3.4 G/DL
ALP SERPL-CCNC: 92 U/L
ALT SERPL W/O P-5'-P-CCNC: 21 U/L
ANION GAP SERPL CALC-SCNC: 6 MMOL/L
AST SERPL-CCNC: 21 U/L
BASOPHILS # BLD AUTO: 0.04 K/UL
BASOPHILS NFR BLD: 0.6 %
BILIRUB SERPL-MCNC: 0.6 MG/DL
BUN SERPL-MCNC: 34 MG/DL
CALCIUM SERPL-MCNC: 10.1 MG/DL
CHLORIDE SERPL-SCNC: 111 MMOL/L
CO2 SERPL-SCNC: 25 MMOL/L
CREAT SERPL-MCNC: 1.5 MG/DL
DIFFERENTIAL METHOD: ABNORMAL
EOSINOPHIL # BLD AUTO: 0.2 K/UL
EOSINOPHIL NFR BLD: 3 %
ERYTHROCYTE [DISTWIDTH] IN BLOOD BY AUTOMATED COUNT: 14.5 %
EST. GFR  (AFRICAN AMERICAN): 51.5 ML/MIN/1.73 M^2
EST. GFR  (NON AFRICAN AMERICAN): 44.6 ML/MIN/1.73 M^2
GLUCOSE SERPL-MCNC: 74 MG/DL
HCT VFR BLD AUTO: 35.7 %
HGB BLD-MCNC: 11.3 G/DL
IMM GRANULOCYTES # BLD AUTO: 0.02 K/UL
IMM GRANULOCYTES NFR BLD AUTO: 0.3 %
INR PPP: 2.3
LYMPHOCYTES # BLD AUTO: 2.2 K/UL
LYMPHOCYTES NFR BLD: 33 %
MCH RBC QN AUTO: 29.1 PG
MCHC RBC AUTO-ENTMCNC: 31.7 G/DL
MCV RBC AUTO: 92 FL
MONOCYTES # BLD AUTO: 0.6 K/UL
MONOCYTES NFR BLD: 9.3 %
NEUTROPHILS # BLD AUTO: 3.6 K/UL
NEUTROPHILS NFR BLD: 53.8 %
NRBC BLD-RTO: 0 /100 WBC
PLATELET # BLD AUTO: 183 K/UL
PMV BLD AUTO: 9.7 FL
POTASSIUM SERPL-SCNC: 5.5 MMOL/L
PROT SERPL-MCNC: 7.7 G/DL
PROTHROMBIN TIME: 22.6 SEC
RBC # BLD AUTO: 3.88 M/UL
SODIUM SERPL-SCNC: 142 MMOL/L
WBC # BLD AUTO: 6.75 K/UL

## 2017-11-15 PROCEDURE — 85025 COMPLETE CBC W/AUTO DIFF WBC: CPT

## 2017-11-15 PROCEDURE — 36415 COLL VENOUS BLD VENIPUNCTURE: CPT

## 2017-11-15 PROCEDURE — 85610 PROTHROMBIN TIME: CPT

## 2017-11-15 PROCEDURE — 80053 COMPREHEN METABOLIC PANEL: CPT

## 2017-11-16 DIAGNOSIS — Z95.2 H/O MECHANICAL AORTIC VALVE REPLACEMENT: ICD-10-CM

## 2017-11-16 DIAGNOSIS — Z79.01 LONG TERM CURRENT USE OF ANTICOAGULANT THERAPY: ICD-10-CM

## 2017-11-16 RX ORDER — WARFARIN SODIUM 5 MG/1
5 TABLET ORAL DAILY
Qty: 120 TABLET | Refills: 3 | Status: SHIPPED | OUTPATIENT
Start: 2017-11-16 | End: 2018-06-04 | Stop reason: SDUPTHER

## 2017-11-16 RX ORDER — WARFARIN SODIUM 5 MG/1
TABLET ORAL
Qty: 111 TABLET | Refills: 3 | Status: ON HOLD | OUTPATIENT
Start: 2017-11-16 | End: 2018-03-19

## 2017-11-22 ENCOUNTER — OFFICE VISIT (OUTPATIENT)
Dept: CARDIOLOGY | Facility: CLINIC | Age: 77
End: 2017-11-22
Payer: MEDICARE

## 2017-11-22 VITALS
DIASTOLIC BLOOD PRESSURE: 67 MMHG | BODY MASS INDEX: 30.89 KG/M2 | WEIGHT: 185.44 LBS | HEART RATE: 51 BPM | HEIGHT: 65 IN | OXYGEN SATURATION: 99 % | SYSTOLIC BLOOD PRESSURE: 144 MMHG

## 2017-11-22 DIAGNOSIS — I25.119 ATHEROSCLEROSIS OF NATIVE CORONARY ARTERY OF NATIVE HEART WITH ANGINA PECTORIS: Primary | ICD-10-CM

## 2017-11-22 DIAGNOSIS — I77.9 BILATERAL CAROTID ARTERY DISEASE: ICD-10-CM

## 2017-11-22 DIAGNOSIS — I12.9 HYPERTENSIVE KIDNEY DISEASE WITH CHRONIC KIDNEY DISEASE STAGE III: ICD-10-CM

## 2017-11-22 DIAGNOSIS — N18.30 HYPERTENSIVE KIDNEY DISEASE WITH CHRONIC KIDNEY DISEASE STAGE III: ICD-10-CM

## 2017-11-22 DIAGNOSIS — E78.5 HYPERLIPIDEMIA, UNSPECIFIED HYPERLIPIDEMIA TYPE: ICD-10-CM

## 2017-11-22 DIAGNOSIS — Z95.2 H/O MECHANICAL AORTIC VALVE REPLACEMENT: ICD-10-CM

## 2017-11-22 DIAGNOSIS — I73.9 PVD (PERIPHERAL VASCULAR DISEASE): ICD-10-CM

## 2017-11-22 DIAGNOSIS — Z98.890 HISTORY OF AORTIC VALVE REPAIR: ICD-10-CM

## 2017-11-22 DIAGNOSIS — Z86.79 HISTORY OF AORTIC VALVE REPAIR: ICD-10-CM

## 2017-11-22 DIAGNOSIS — I10 ESSENTIAL HYPERTENSION: ICD-10-CM

## 2017-11-22 PROCEDURE — 99214 OFFICE O/P EST MOD 30 MIN: CPT | Mod: S$GLB,,, | Performed by: INTERNAL MEDICINE

## 2017-11-22 PROCEDURE — 99999 PR PBB SHADOW E&M-EST. PATIENT-LVL IV: CPT | Mod: PBBFAC,,, | Performed by: INTERNAL MEDICINE

## 2017-11-22 PROCEDURE — 99499 UNLISTED E&M SERVICE: CPT | Mod: S$GLB,,, | Performed by: INTERNAL MEDICINE

## 2017-11-22 NOTE — PROGRESS NOTES
Interventional Cardiology Clinic Note  Reason for Visit: CAD / PAD    HPI:   77 y.o male with a PMHx of CAD (s/p CABG in 2002 - LIMA-LAD, SVG-OM2, multiple PCIs), AS (s/p mechanical AVR), HTN, HLD, PAD, T2DM, CKD III that presents to clinic today for a 6 month follow-up visit.    Since he was last seen, he reports improvement of his chest pain and LE claudication. Currently reports CCS Class II sx and Dianna IIb claudication which he reports is an improvement since previous. Has only required NTG once in the last month. During last visit, he had significant diarrhea, hydralazine was stopped at that time. Reports he has chronic diarrhea. Otherwise, denies chest pain at rest or SOB at rest. Starts to have R buttocks pain if he walks beyond 50 ft.    Denies palpitations, lightheadedness, dizziness, syncope, LE edema.    ROS:    Constitution: Negative for fever or chills. Negative for weight loss or gain.   HENT: Negative for sore throat or headaches. Negative for rhinorrhea.  Eyes: Negative for blurred or double vision.   Cardiovascular: See above  Pulmonary: Negative for SOB. Negative for cough.   Gastrointestinal: Negative for abdominal pain. Negative for nausea/ vomiting.  : Negative for dysuria.   Neurological: Negative for focal weakness or sensory changes.  PMH:     Past Medical History:   Diagnosis Date    Bilateral carotid artery disease 2/9/2017    Bleeding     CAD (coronary atherosclerotic disease) 9/11/2012    Cataract     Chronic kidney disease     DM (diabetes mellitus) 9/26/2012    History of gout 9/26/2012    Hyperlipidemia     Hypertension     Mechanical heart valve present     Renal insufficiency 9/11/2012    Type II or unspecified type diabetes mellitus without mention of complication, not stated as uncontrolled      Past Surgical History:   Procedure Laterality Date    ADENOIDECTOMY      CARDIAC CATHETERIZATION      CARDIAC VALVE REPLACEMENT      CARDIAC VALVE SURGERY      COLON  SURGERY      COLONOSCOPY N/A 3/31/2017    Procedure: COLONOSCOPY;  Surgeon: Bruno Raymond MD;  Location: [facility] ([address] [place] [address]);  Service: Endoscopy;  Laterality: N/A;  Patient's wife requesting date.    CORONARY ANGIOPLASTY      CORONARY ARTERY BYPASS GRAFT      SPINE SURGERY      TONSILLECTOMY      VASECTOMY       Allergies:   Review of patient's allergies indicates:  No Known Allergies  Medications:     Current Outpatient Prescriptions on File Prior to Visit   Medication Sig Dispense Refill    allopurinol (ZYLOPRIM) 100 MG tablet TAKE 1 TABLET ONE TIME DAILY 90 tablet 4    atorvastatin (LIPITOR) 80 MG tablet TAKE 1 TABLET ONE TIME DAILY 90 tablet 3    fenofibrate micronized (LOFIBRA) 200 MG Cap Take 200 mg by mouth daily with breakfast.      fosinopril (MONOPRIL) 40 MG tablet TAKE 1 TABLET ONE TIME DAILY 90 tablet 4    furosemide (LASIX) 20 MG tablet TAKE 1 TABLET ONE TIME DAILY 90 tablet 4    glimepiride (AMARYL) 1 MG tablet Take 1 tablet (1 mg total) by mouth every morning. 90 tablet 2    isosorbide mononitrate (IMDUR) 120 MG 24 hr tablet Take 1 tablet (120 mg total) by mouth once daily. 90 tablet 4    metoprolol tartrate (LOPRESSOR) 50 MG tablet Take 1 tablet (50 mg total) by mouth once daily. 90 tablet 3    nifedipine (PROCARDIA-XL) 30 MG (OSM) 24 hr tablet Take 1 tablet (30 mg total) by mouth once daily. 30 tablet 11    omega-3 acid ethyl esters (LOVAZA) 1 gram capsule Take 2 capsules (2 g total) by mouth 2 (two) times daily. 360 capsule 5    warfarin (COUMADIN) 5 MG tablet Take 1 tablet (5 mg total) by mouth Daily. Current Dose (7.5 mg on Mon, Wed, Fri; 5 mg all other days) 120 tablet 3    warfarin (COUMADIN) 5 MG tablet TAKE 1 AND 1/2 TABLETS (7.5 MG) ON MONDAY, WEDNESDAY, FRIDAY AND 1 TABLET (5 MG) ALL OTHER DAYS (CURRENT DOSE) 111 tablet 3    ferrous sulfate 325 (65 FE) MG EC tablet Take 1 tablet (325 mg total) by mouth once daily. 90 tablet 4    nitroGLYCERIN (NITROSTAT) 0.4 MG SL  "tablet Place 1 tablet (0.4 mg total) under the tongue every 5 (five) minutes as needed. As needed for chest pain 90 tablet 4     No current facility-administered medications on file prior to visit.      Social History:     Social History   Substance Use Topics    Smoking status: Former Smoker     Packs/day: 1.00     Years: 20.00     Quit date: 9/11/1980    Smokeless tobacco: Never Used    Alcohol use No     Family History:     Family History   Problem Relation Age of Onset    Heart failure Mother     Heart disease Mother     Heart failure Father     Heart disease Father     Alcohol abuse Father     Heart failure Brother     Heart disease Brother     Diabetes Brother     No Known Problems Sister     No Known Problems Maternal Grandmother     No Known Problems Maternal Grandfather     No Known Problems Paternal Grandmother     No Known Problems Paternal Grandfather     Heart disease Sister     No Known Problems Maternal Aunt     No Known Problems Maternal Uncle     No Known Problems Paternal Aunt     No Known Problems Paternal Uncle     Amblyopia Neg Hx     Blindness Neg Hx     Cancer Neg Hx     Cataracts Neg Hx     Glaucoma Neg Hx     Hypertension Neg Hx     Macular degeneration Neg Hx     Retinal detachment Neg Hx     Strabismus Neg Hx     Stroke Neg Hx     Thyroid disease Neg Hx     Anemia Neg Hx     Arrhythmia Neg Hx     Asthma Neg Hx     Clotting disorder Neg Hx     Fainting Neg Hx     Heart attack Neg Hx     Hyperlipidemia Neg Hx     Atrial Septal Defect Neg Hx     Melanoma Neg Hx      Physical Exam:   BP (!) 144/67 (BP Location: Right arm, Patient Position: Sitting, BP Method: Large (Automatic))   Pulse (!) 51   Ht 5' 4.5" (1.638 m)   Wt 84.1 kg (185 lb 6.5 oz)   SpO2 99%   BMI 31.33 kg/m²      Constitutional: NAD, conversant  HEENT: Sclera anicteric, EOMI, OP clear  Neck: No JVD, no carotid bruits  CV: RRR, +mechanical click, normal S1/S2  Pulm: CTAB, no wheezes, " rales, or ronchi  GI: Abdomen soft, NTND, +BS  Extremities: No LE edema, warm. Pulses: (1+ BL DP and PT)  Skin: No ecchymosis, erythema, or ulcers  Psych: AOx3, appropriate affect  Neuro: No focal deficits    Labs:     Lab Results   Component Value Date     11/15/2017    K 5.5 (H) 11/15/2017     (H) 11/15/2017    CO2 25 11/15/2017    BUN 34 (H) 11/15/2017    CREATININE 1.5 (H) 11/15/2017    ANIONGAP 6 (L) 11/15/2017     Lab Results   Component Value Date    AST 21 11/15/2017    ALT 21 11/15/2017    ALKPHOS 92 11/15/2017    BILITOT 0.6 11/15/2017    BILIDIR 0.1 05/25/2010    ALBUMIN 3.4 (L) 11/15/2017     Lab Results   Component Value Date    CALCIUM 10.1 11/15/2017    PHOS 3.0 10/12/2017     No results found for: BNP, BNPTRIAGEBLO Lab Results   Component Value Date    WBC 6.75 11/15/2017    HGB 11.3 (L) 11/15/2017    HCT 35.7 (L) 11/15/2017     11/15/2017    GRAN 3.6 11/15/2017    GRAN 53.8 11/15/2017     Lab Results   Component Value Date    INR 2.3 (H) 11/15/2017    INR 2.0 (H) 03/15/2010     Lab Results   Component Value Date    CHOL 126 02/02/2017    HDL 34 (L) 02/02/2017    LDLCALC 62.2 (L) 02/02/2017    TRIG 149 02/02/2017     Lab Results   Component Value Date    HGBA1C 5.7 05/24/2017     Lab Results   Component Value Date    TSH 3.029 02/02/2017    G8TDRVY 4.5 06/01/2004          Imaging:         EF   Date Value Ref Range Status   05/03/2017 65 55 - 65    05/18/2016 55 55 - 65    03/11/2015 60 55 - 65        Assessment and Plan:    77 y.o male with a PMHx of CAD (s/p CABG in 2002 - LIMA-LAD, SVG-OM2, multiple PCIs), AS (s/p mechanical AVR), HTN, HLD, PAD, T2DM, CKD III that presents to clinic today for a 6 month follow-up visit. Doing well compared to previous. Stable CCS class II angina and Dianna II b claudication.  Continue current medical management.     Plan  Continue current medication regimen  F/U in 6 months with Dr. Vences with labs and 2D echo w/ CFD      Signed:  Conor  MD Zoraida  Cardiovascular Disease Fellow, PGY-V  Pager: [phone]  11/22/2017 8:56 AM    Follow-up:   Future Appointments  Date Time Provider Department Center   12/5/2017 7:00 AM LAB, APPOINTMENT [facility]   12/27/2017 8:00 AM Marcie Lundy, PharmD [facility]

## 2017-11-22 NOTE — LETTER
November 22, 2017      Devon Langston Jr., MD  [address]  [facility] [address]           [facility]  [address]  [facility] [address]  Phone: [phone]          Patient: Dariel Urbina   MR Number: [id number]   YOB: 1940   Date of Visit: 11/22/2017       Dear Dr. Devon Langston Jr.:    Thank you for referring Dariel Urbina to me for evaluation. Attached you will find relevant portions of my assessment and plan of care.    If you have questions, please do not hesitate to call me. I look forward to following Dariel Urbina along with you.    Sincerely,    José Miguel Vences MD    Enclosure  CC:  No Recipients    If you would like to receive this communication electronically, please contact [email or web address] or [phone] to request more information on [employer] access.    For providers and/or their staff who would like to refer a patient to [facility], please contact us through our one-stop-shop provider referral line, [facility] , at [phone].    If you feel you have received this communication in error or would no longer like to receive these types of communications, please e-mail [email or web address]

## 2017-11-24 NOTE — PROGRESS NOTES
Patient, Dariel Urbina (MRN #[id number]), presented with a recent Estimated PA Systolic Pressure greater than 40 mmHG consistent with the definition of pulmonary hypertension (ICD10 - I27.0).    Est. PA Systolic Pressure   Date Value Ref Range Status   05/03/2017 53.97 (A)       The patient's pulmonary hypertension was monitored, evaluated, addressed and/or treated. This addendum to the medical record is made on 11/24/2017.

## 2017-12-04 NOTE — PROGRESS NOTES
Wife called 12/04/17 to reschedule appointment 12/27/17 to 12/22/17. Patient is going out of town.

## 2017-12-05 ENCOUNTER — LAB VISIT (OUTPATIENT)
Dept: LAB | Facility: HOSPITAL | Age: 77
End: 2017-12-05
Attending: INTERNAL MEDICINE
Payer: MEDICARE

## 2017-12-05 DIAGNOSIS — E78.5 HYPERLIPIDEMIA, UNSPECIFIED HYPERLIPIDEMIA TYPE: ICD-10-CM

## 2017-12-05 DIAGNOSIS — E11.51 TYPE 2 DIABETES MELLITUS WITH DIABETIC PERIPHERAL ANGIOPATHY WITHOUT GANGRENE, WITHOUT LONG-TERM CURRENT USE OF INSULIN: ICD-10-CM

## 2017-12-05 LAB
ALBUMIN SERPL BCP-MCNC: 3.4 G/DL
ALP SERPL-CCNC: 74 U/L
ALT SERPL W/O P-5'-P-CCNC: 20 U/L
ANION GAP SERPL CALC-SCNC: 4 MMOL/L
AST SERPL-CCNC: 23 U/L
BILIRUB SERPL-MCNC: 0.4 MG/DL
BUN SERPL-MCNC: 35 MG/DL
CALCIUM SERPL-MCNC: 9.5 MG/DL
CHLORIDE SERPL-SCNC: 113 MMOL/L
CHOLEST SERPL-MCNC: 143 MG/DL
CHOLEST/HDLC SERPL: 4.2 {RATIO}
CO2 SERPL-SCNC: 25 MMOL/L
CREAT SERPL-MCNC: 1.3 MG/DL
EST. GFR  (AFRICAN AMERICAN): >60 ML/MIN/1.73 M^2
EST. GFR  (NON AFRICAN AMERICAN): 53 ML/MIN/1.73 M^2
ESTIMATED AVG GLUCOSE: 111 MG/DL
GLUCOSE SERPL-MCNC: 78 MG/DL
HBA1C MFR BLD HPLC: 5.5 %
HDLC SERPL-MCNC: 34 MG/DL
HDLC SERPL: 23.8 %
LDLC SERPL CALC-MCNC: 72.6 MG/DL
NONHDLC SERPL-MCNC: 109 MG/DL
POTASSIUM SERPL-SCNC: 5.2 MMOL/L
PROT SERPL-MCNC: 7.3 G/DL
SODIUM SERPL-SCNC: 142 MMOL/L
TRIGL SERPL-MCNC: 182 MG/DL

## 2017-12-05 PROCEDURE — 80061 LIPID PANEL: CPT

## 2017-12-05 PROCEDURE — 83036 HEMOGLOBIN GLYCOSYLATED A1C: CPT

## 2017-12-05 PROCEDURE — 36415 COLL VENOUS BLD VENIPUNCTURE: CPT

## 2017-12-05 PROCEDURE — 80053 COMPREHEN METABOLIC PANEL: CPT

## 2017-12-14 ENCOUNTER — TELEPHONE (OUTPATIENT)
Dept: INTERNAL MEDICINE | Facility: CLINIC | Age: 77
End: 2017-12-14

## 2017-12-14 NOTE — TELEPHONE ENCOUNTER
----- Message from Reny Shook sent at 12/14/2017  9:51 AM CST -----  Contact: Mari/Spouse/[phone]  Mari called in regards needing to talk with Dr Langston about getting a f/u appointment for patient for 12/22/17 (earliest appointment that I was able to find is for 03/01/18). Please call and advise.       Thank you!!!

## 2017-12-22 ENCOUNTER — ANTI-COAG VISIT (OUTPATIENT)
Dept: CARDIOLOGY | Facility: CLINIC | Age: 77
End: 2017-12-22
Payer: MEDICARE

## 2017-12-22 DIAGNOSIS — Z79.01 LONG-TERM (CURRENT) USE OF ANTICOAGULANTS: Primary | ICD-10-CM

## 2017-12-22 DIAGNOSIS — Z79.01 LONG TERM CURRENT USE OF ANTICOAGULANT THERAPY: ICD-10-CM

## 2017-12-22 DIAGNOSIS — Z95.2 H/O MECHANICAL AORTIC VALVE REPLACEMENT: ICD-10-CM

## 2017-12-22 LAB — INR PPP: 2.2 (ref 2–3)

## 2017-12-22 PROCEDURE — 85610 PROTHROMBIN TIME: CPT | Mod: QW,S$GLB,,

## 2017-12-22 NOTE — PROGRESS NOTES
INR within normal range today. Patient denies any bleeding, bruising or other changes. Patient is stable on this dose. We will maintain his current dose and follow up in 6 weeks. Advised patient to notify us of any changes or concerns.

## 2017-12-29 RX ORDER — GLIMEPIRIDE 1 MG/1
TABLET ORAL
Qty: 90 TABLET | Refills: 2 | Status: SHIPPED | OUTPATIENT
Start: 2017-12-29 | End: 2018-01-04 | Stop reason: SDUPTHER

## 2018-01-04 ENCOUNTER — OFFICE VISIT (OUTPATIENT)
Dept: INTERNAL MEDICINE | Facility: CLINIC | Age: 78
End: 2018-01-04
Payer: MEDICARE

## 2018-01-04 VITALS
HEIGHT: 64 IN | SYSTOLIC BLOOD PRESSURE: 132 MMHG | WEIGHT: 192.69 LBS | HEART RATE: 53 BPM | BODY MASS INDEX: 32.9 KG/M2 | DIASTOLIC BLOOD PRESSURE: 60 MMHG

## 2018-01-04 DIAGNOSIS — I71.9 AORTIC ANEURYSM WITHOUT RUPTURE, UNSPECIFIED PORTION OF AORTA: ICD-10-CM

## 2018-01-04 DIAGNOSIS — E66.9 OBESITY, DIABETES, AND HYPERTENSION SYNDROME: ICD-10-CM

## 2018-01-04 DIAGNOSIS — I25.119 ATHEROSCLEROSIS OF NATIVE CORONARY ARTERY OF NATIVE HEART WITH ANGINA PECTORIS: ICD-10-CM

## 2018-01-04 DIAGNOSIS — E11.22 TYPE 2 DIABETES MELLITUS WITH STAGE 3 CHRONIC KIDNEY DISEASE, WITHOUT LONG-TERM CURRENT USE OF INSULIN: ICD-10-CM

## 2018-01-04 DIAGNOSIS — D63.1 ANEMIA OF CHRONIC RENAL FAILURE, STAGE 3 (MODERATE): Primary | ICD-10-CM

## 2018-01-04 DIAGNOSIS — N25.81 HYPERPARATHYROIDISM DUE TO RENAL INSUFFICIENCY: ICD-10-CM

## 2018-01-04 DIAGNOSIS — I10 ESSENTIAL HYPERTENSION: ICD-10-CM

## 2018-01-04 DIAGNOSIS — N18.30 TYPE 2 DIABETES MELLITUS WITH STAGE 3 CHRONIC KIDNEY DISEASE, WITHOUT LONG-TERM CURRENT USE OF INSULIN: ICD-10-CM

## 2018-01-04 DIAGNOSIS — E11.69 OBESITY, DIABETES, AND HYPERTENSION SYNDROME: ICD-10-CM

## 2018-01-04 DIAGNOSIS — E11.51 TYPE 2 DIABETES MELLITUS WITH DIABETIC PERIPHERAL ANGIOPATHY WITHOUT GANGRENE, WITHOUT LONG-TERM CURRENT USE OF INSULIN: ICD-10-CM

## 2018-01-04 DIAGNOSIS — I73.9 PVD (PERIPHERAL VASCULAR DISEASE): ICD-10-CM

## 2018-01-04 DIAGNOSIS — I12.9 HYPERTENSIVE KIDNEY DISEASE WITH CHRONIC KIDNEY DISEASE STAGE III: ICD-10-CM

## 2018-01-04 DIAGNOSIS — N18.30 HYPERTENSIVE KIDNEY DISEASE WITH CHRONIC KIDNEY DISEASE STAGE III: ICD-10-CM

## 2018-01-04 DIAGNOSIS — N18.30 ANEMIA OF CHRONIC RENAL FAILURE, STAGE 3 (MODERATE): Primary | ICD-10-CM

## 2018-01-04 DIAGNOSIS — R19.7 DIARRHEA, UNSPECIFIED TYPE: ICD-10-CM

## 2018-01-04 DIAGNOSIS — I73.9 CLAUDICATION OF LEFT LOWER EXTREMITY: ICD-10-CM

## 2018-01-04 DIAGNOSIS — E11.59 OBESITY, DIABETES, AND HYPERTENSION SYNDROME: ICD-10-CM

## 2018-01-04 DIAGNOSIS — I15.2 OBESITY, DIABETES, AND HYPERTENSION SYNDROME: ICD-10-CM

## 2018-01-04 DIAGNOSIS — Z79.01 LONG TERM CURRENT USE OF ANTICOAGULANT THERAPY: ICD-10-CM

## 2018-01-04 PROCEDURE — 99999 PR PBB SHADOW E&M-EST. PATIENT-LVL III: CPT | Mod: PBBFAC,,, | Performed by: INTERNAL MEDICINE

## 2018-01-04 PROCEDURE — 99499 UNLISTED E&M SERVICE: CPT | Mod: S$GLB,,, | Performed by: INTERNAL MEDICINE

## 2018-01-04 PROCEDURE — 99214 OFFICE O/P EST MOD 30 MIN: CPT | Mod: S$GLB,,, | Performed by: INTERNAL MEDICINE

## 2018-01-04 RX ORDER — GLIMEPIRIDE 1 MG/1
1 TABLET ORAL EVERY MORNING
Qty: 90 TABLET | Refills: 2 | Status: SHIPPED | OUTPATIENT
Start: 2018-01-04 | End: 2018-10-17 | Stop reason: SDUPTHER

## 2018-01-04 NOTE — PROGRESS NOTES
Patient ID: Dariel Urbina is a 77 y.o. male.    Chief Complaint: Follow-up    HPI    75 yo M here for follow up appointment for multiple medical problems.      Diabetes mellitus type 2. Last A1c was 5.5 -- . He was put on amaryl 1mg last visit. Weight is down 7 # since last time .  . Weight today is 192 # lbs- heavly dressed.. Patient denies polyuria, polydipsia, visual disturbances.         Hyperlipidemia. On lipitor 40 mg , lovaza. And Fenofibrate --Recent lipid panel showed CHol 143, Hdl 34, LDL 72.6, and . He says he is taking all his cholesterol meds.         HTN. On monopril, lasix, imdur, lopressor. Pt does not measure BPs at home. BP today is 132/60. . There is some discussion about stopping the ACE due to hyperkalemia. K=5.2.  He saw nephrology in August.          CKD stage III. Pt saw Nephrology in 3/2017-- than note was reviewed. Cr back down to baseline of 1.3.  His potasium is   5.2. He has been instructed on a a low potasium diet by nephrology, but is not following one.  He continues on his ACE- fosinopril.    BP has been well  controled at home .  Possible secondary hyperparathyroidism (- in July 2016) ).         H/o partial colon resection due to diverticulosis. He has been having diarrhea since -- He take imodium and it helps.  NO N/V.  No recent travel.  No sick contacts.  NO recent antibiotics recently.       .    H/o CAD s/p CABG 2002, multiple stents, aortic valve replacement on coumadin. He saws he been having  chest pain for about 3 years.   . He saw Dr. Vences In recently and that note was reviewed--  . He has LÓPEZ but this is chronic and some left sided claudication.   The chest pain he says in in his throat and her takes NTG and it does not help.  It is not with exertion but is with lying down.  He says the pain he is having is a burning in the throat. It has not changed in 3 years.        AAA- last us Showed 3.35 cm AAA- largest supra renal area.             H/o gout. On  "allopurinol. Last flare was 18 mo ago in left foot. He remains on Allopurinol.      Valvular heart disease- with mechanical AV, some MVR and some TVR-- NO He has jazzy eCP with walkin gout to mail box and his has discussed this with cards.  Reviewed recent PET stress test.   SOB with walking 50 feet- unchanged since seeing cards.  He also has some chest pain with carrying weight- he has discussed this with cards-- it is not changing.         Review of Systems    Constitutional: Negative for fever, chills and unexpected weight change.    HENT: Negative for congestion, rhinorrhea and sinus pressure.    Eyes: Negative for visual disturbance.    Respiratory: He Has some SOB, but this is unchanged. He can mow the lawn with walk behind mower.    This has not changed since last visit.    Cardiovascular:as above.  .    Gastrointestinal: Negative for nausea, vomiting, abdominal pain, and constipation.  He does have some diarrhea-  - as above.    Genitourinary: Negative for dysuria, urgency and difficulty urinating.    Musculoskeletal: He reports his back- that was bothering him last vsiit- is not bothering him recently.    Skin: Negative.    Neurological: Negative for dizziness, syncope and headaches.    Hematological: Negative.    Psychiatric/Behavioral: Negative.    All other systems reviewed and are negative.    Objective:      Physical Exam /60   Pulse (!) 53   Ht 5' 4" (1.626 m)   Wt 87.4 kg (192 lb 10.9 oz)   BMI 33.07 kg/m²     Constitutional: He is oriented to person, place, and time. He appears well-developed and well-nourished. No distress.    HENT:    Head: Normocephalic and atraumatic.    Mouth/Throat: Oropharynx is clear and moist. No oropharyngeal exudate.    Eyes: Conjunctivae and EOM are normal.    Neck: Normal range of motion. Neck supple. No JVD present. No tracheal deviation present.    Cardiovascular: Regular rhythm and normal heart sounds. Exam reveals no gallop and no friction rub.    Systolic " murmur heard.  Pulse is 50  Pulmonary/Chest: Effort normal and breath sounds normal. No respiratory distress. He has no wheezes. He has no rales.    Abdominal: Soft. Bowel sounds are normal. He exhibits no distension. There is no tenderness. There is no rebound.   Musculoskeletal: Normal range of motion. He exhibits no edema and no tenderness.   Neurological: He is alert and oriented to person, place, and time.    Skin: Skin is warm and dry. No rash noted. He is not diaphoretic. No erythema.   Psychiatric: He has a normal mood and affect. His behavior is normal.    Foot exam- Protective Sensation (w/ 10 gram monofilament):  Right: Decreased  Left: Decreased     Visual Inspection:  Normal -  Bilateral     Pedal Pulses:   Right: Diminshed  Left: Diminshed     Posterior tibialis:   Right:Present  Left: Present     .ffot Decrease sensation to Monofilament test on both feet more distally.          Assessment:      1.   DM (diabetes mellitus)     2.   Hyperlipidemia     3.   Chronic kidney disease, stage III (moderate)     4.   History of colon resection     5.   Hypertension     6.   CAD (coronary atherosclerotic disease)     7.    8.  9.  10.  History of aortic valve repair    AAA  AVR-[facility]- on coumadin   CP- will try ranitidine 300 at bed time and see if it helps-- he can also try tums.     Plan:      1. DM type 2 for stable-- Hgb A1c controled.  . - follow up in 6 months   2. HLD. Lipid panel better since last visit. On lipitor, lovaza.  lipitor to 80 mg a day.   3. HTN. Stable   4. CKD, stage III. Seeing nephrology. Counseled on importance of BP control. Cont BP meds. Discussed low potassium diet.    5. H/o partial colon resection. No complaints with N/V/D, constipation. c-scope due in 2020.    6. H/o CAD s/p CABG 2002, multiple stents, aortic valve replacement on coumadin. Cont coumadin and BP meds.  7. H/o gout.  Cont allopurinol.    8. hyperkalemia --this is better-- recheck in 4 months will need to follow  a low  k diet  9. Needs to take  Imodium regularly.

## 2018-01-19 ENCOUNTER — PES CALL (OUTPATIENT)
Dept: ADMINISTRATIVE | Facility: CLINIC | Age: 78
End: 2018-01-19

## 2018-01-31 ENCOUNTER — ANTI-COAG VISIT (OUTPATIENT)
Dept: CARDIOLOGY | Facility: CLINIC | Age: 78
End: 2018-01-31
Payer: MEDICARE

## 2018-01-31 DIAGNOSIS — Z79.01 LONG TERM CURRENT USE OF ANTICOAGULANT THERAPY: Primary | ICD-10-CM

## 2018-01-31 DIAGNOSIS — Z95.2 H/O MECHANICAL AORTIC VALVE REPLACEMENT: ICD-10-CM

## 2018-01-31 LAB — INR PPP: 1.8 (ref 2–3)

## 2018-01-31 PROCEDURE — 99211 OFF/OP EST MAY X REQ PHY/QHP: CPT | Mod: 25,S$GLB,, | Performed by: PHARMACIST

## 2018-01-31 PROCEDURE — 85610 PROTHROMBIN TIME: CPT | Mod: QW,S$GLB,, | Performed by: PHARMACIST

## 2018-01-31 NOTE — PROGRESS NOTES
Patient reports having a seeving of spinach recently, which he does not usually have. Otherwise, Patient denies any changes in diet, medications, or health that would effect warfarin therapy. INR has been previously very stable on this dose so we will boost his coumadin today then re challenge his previous weekly dose. Patient was re-educated on situations that would require placing a call to the coumadin clinic, including bleeding or unusual bruising issues, changes in health, diet or medications, upcoming procedures that require warfarin interruption, and missed coumadin dose(s). Patient expressed understanding that avoidance of consistency with these parameters could cause fluctuations in INR, leading to more frequent visits and increase risk of adverse events.

## 2018-02-20 ENCOUNTER — OFFICE VISIT (OUTPATIENT)
Dept: INTERNAL MEDICINE | Facility: CLINIC | Age: 78
End: 2018-02-20
Payer: MEDICARE

## 2018-02-20 ENCOUNTER — ANTI-COAG VISIT (OUTPATIENT)
Dept: CARDIOLOGY | Facility: CLINIC | Age: 78
End: 2018-02-20
Payer: MEDICARE

## 2018-02-20 VITALS
HEIGHT: 64 IN | HEART RATE: 51 BPM | DIASTOLIC BLOOD PRESSURE: 60 MMHG | OXYGEN SATURATION: 97 % | SYSTOLIC BLOOD PRESSURE: 124 MMHG | BODY MASS INDEX: 32.37 KG/M2 | WEIGHT: 189.63 LBS

## 2018-02-20 DIAGNOSIS — I72.3 ANEURYSM ARTERY, ILIAC: ICD-10-CM

## 2018-02-20 DIAGNOSIS — I10 ESSENTIAL HYPERTENSION: ICD-10-CM

## 2018-02-20 DIAGNOSIS — Z86.79 HISTORY OF AORTIC VALVE REPAIR: ICD-10-CM

## 2018-02-20 DIAGNOSIS — D63.1 ANEMIA OF CHRONIC RENAL FAILURE, STAGE 3 (MODERATE): ICD-10-CM

## 2018-02-20 DIAGNOSIS — I27.9 PULMONARY HEART DISEASE: ICD-10-CM

## 2018-02-20 DIAGNOSIS — I12.9 HYPERTENSIVE KIDNEY DISEASE WITH CHRONIC KIDNEY DISEASE STAGE III: ICD-10-CM

## 2018-02-20 DIAGNOSIS — E11.59 OBESITY, DIABETES, AND HYPERTENSION SYNDROME: ICD-10-CM

## 2018-02-20 DIAGNOSIS — Z98.890 HISTORY OF AORTIC VALVE REPAIR: ICD-10-CM

## 2018-02-20 DIAGNOSIS — Z79.01 LONG TERM CURRENT USE OF ANTICOAGULANT THERAPY: ICD-10-CM

## 2018-02-20 DIAGNOSIS — Z00.00 ENCOUNTER FOR PREVENTIVE HEALTH EXAMINATION: Primary | ICD-10-CM

## 2018-02-20 DIAGNOSIS — E11.22 TYPE 2 DIABETES MELLITUS WITH STAGE 3 CHRONIC KIDNEY DISEASE, WITHOUT LONG-TERM CURRENT USE OF INSULIN: ICD-10-CM

## 2018-02-20 DIAGNOSIS — I71.9 AORTIC ANEURYSM WITHOUT RUPTURE, UNSPECIFIED PORTION OF AORTA: ICD-10-CM

## 2018-02-20 DIAGNOSIS — E78.5 HYPERLIPIDEMIA, UNSPECIFIED HYPERLIPIDEMIA TYPE: ICD-10-CM

## 2018-02-20 DIAGNOSIS — I15.2 OBESITY, DIABETES, AND HYPERTENSION SYNDROME: ICD-10-CM

## 2018-02-20 DIAGNOSIS — N18.30 TYPE 2 DIABETES MELLITUS WITH STAGE 3 CHRONIC KIDNEY DISEASE, WITHOUT LONG-TERM CURRENT USE OF INSULIN: ICD-10-CM

## 2018-02-20 DIAGNOSIS — E11.69 OBESITY, DIABETES, AND HYPERTENSION SYNDROME: ICD-10-CM

## 2018-02-20 DIAGNOSIS — I51.89 LEFT VENTRICULAR DIASTOLIC DYSFUNCTION WITH PRESERVED SYSTOLIC FUNCTION: ICD-10-CM

## 2018-02-20 DIAGNOSIS — Z95.2 H/O MECHANICAL AORTIC VALVE REPLACEMENT: ICD-10-CM

## 2018-02-20 DIAGNOSIS — Z90.49 HISTORY OF COLON RESECTION: ICD-10-CM

## 2018-02-20 DIAGNOSIS — N25.81 HYPERPARATHYROIDISM DUE TO RENAL INSUFFICIENCY: ICD-10-CM

## 2018-02-20 DIAGNOSIS — I73.9 CLAUDICATION OF LEFT LOWER EXTREMITY: ICD-10-CM

## 2018-02-20 DIAGNOSIS — E11.9 ENCOUNTER FOR DIABETIC FOOT EXAM: ICD-10-CM

## 2018-02-20 DIAGNOSIS — I25.119 ATHEROSCLEROSIS OF NATIVE CORONARY ARTERY OF NATIVE HEART WITH ANGINA PECTORIS: ICD-10-CM

## 2018-02-20 DIAGNOSIS — I73.9 PVD (PERIPHERAL VASCULAR DISEASE): ICD-10-CM

## 2018-02-20 DIAGNOSIS — E11.51 TYPE 2 DIABETES MELLITUS WITH DIABETIC PERIPHERAL ANGIOPATHY WITHOUT GANGRENE, WITHOUT LONG-TERM CURRENT USE OF INSULIN: ICD-10-CM

## 2018-02-20 DIAGNOSIS — N18.30 HYPERTENSIVE KIDNEY DISEASE WITH CHRONIC KIDNEY DISEASE STAGE III: ICD-10-CM

## 2018-02-20 DIAGNOSIS — Z79.01 LONG TERM CURRENT USE OF ANTICOAGULANT THERAPY: Primary | ICD-10-CM

## 2018-02-20 DIAGNOSIS — I77.9 BILATERAL CAROTID ARTERY DISEASE: ICD-10-CM

## 2018-02-20 DIAGNOSIS — N18.30 ANEMIA OF CHRONIC RENAL FAILURE, STAGE 3 (MODERATE): ICD-10-CM

## 2018-02-20 DIAGNOSIS — I20.9 ANGINA, CLASS III: ICD-10-CM

## 2018-02-20 DIAGNOSIS — E66.9 OBESITY, DIABETES, AND HYPERTENSION SYNDROME: ICD-10-CM

## 2018-02-20 LAB — INR PPP: 2.3 (ref 2–3)

## 2018-02-20 PROCEDURE — 99499 UNLISTED E&M SERVICE: CPT | Mod: S$GLB,,, | Performed by: NURSE PRACTITIONER

## 2018-02-20 PROCEDURE — 85610 PROTHROMBIN TIME: CPT | Mod: QW,S$GLB,, | Performed by: PHARMACIST

## 2018-02-20 PROCEDURE — 99999 PR PBB SHADOW E&M-EST. PATIENT-LVL V: CPT | Mod: PBBFAC,,, | Performed by: NURSE PRACTITIONER

## 2018-02-20 PROCEDURE — G0439 PPPS, SUBSEQ VISIT: HCPCS | Mod: S$GLB,,, | Performed by: NURSE PRACTITIONER

## 2018-02-20 NOTE — PROGRESS NOTES
"Dariel Urbina presented for a  Medicare AWV and comprehensive Health Risk Assessment today. The following components were reviewed and updated:    · Medical history  · Family History  · Social history  · Allergies and Current Medications  · Health Risk Assessment  · Health Maintenance  · Care Team     ** See Completed Assessments for Annual Wellness Visit within the encounter summary.**       The following assessments were completed:  · Living Situation  · CAGE  · Depression Screening  · Timed Get Up and Go  · Whisper Test  · Cognitive Function Screening  ·   ·   · Nutrition Screening  · ADL Screening  · PAQ Screening    Vitals:    02/20/18 0943   BP: 124/60   Pulse: (!) 51   SpO2: 97%   Weight: 86 kg (189 lb 9.5 oz)   Height: 5' 4" (1.626 m)     Body mass index is 32.54 kg/m².  Physical Exam   Constitutional: He is oriented to person, place, and time. He appears well-developed and well-nourished.   HENT:   Head: Normocephalic and atraumatic.   Nose: Nose normal.   Eyes: Conjunctivae and EOM are normal.   Cardiovascular: Normal rate, regular rhythm, normal heart sounds and intact distal pulses.    Pulses:       Dorsalis pedis pulses are 1+ on the right side, and 1+ on the left side.   Pulmonary/Chest: Effort normal and breath sounds normal.   Musculoskeletal: Normal range of motion.        Right foot: There is normal range of motion and no deformity.        Left foot: There is normal range of motion and no deformity.   Feet:   Right Foot:   Protective Sensation: 6 sites tested. 6 sites sensed.   Left Foot:   Protective Sensation: 6 sites tested. 6 sites sensed.   Skin Integrity: Positive for skin breakdown.   Neurological: He is alert and oriented to person, place, and time.   Skin: Skin is warm and dry.   Psychiatric: He has a normal mood and affect. His behavior is normal. Judgment and thought content normal.   Nursing note and vitals reviewed.        Diagnoses and health risks identified today and associated " recommendations/orders:    1. Encounter for preventive health examination  Assessment performed. Health maintenance updated. Chart review completed.    2. Encounter for diabetic foot exam  - Ambulatory Referral to Podiatry    3. Aortic aneurysm without rupture, unspecified portion of aorta  Imaging 2/23/2017.  CONCLUSIONS   There is an Abdominal Aortic Aneurysm largest at the Suprarenal level measuring 3.35 cm.    There is an accelerated PSV at the left common iliac level of 317 cm/s  indicating a possible stenosis.  Followed by Cardiology.    4. Aneurysm artery, iliac  maging 2/23/2017.  CONCLUSIONS   There is an Abdominal Aortic Aneurysm largest at the Suprarenal level measuring 3.35 cm.    There is an accelerated PSV at the left common iliac level of 317 cm/s  indicating a possible stenosis.  Followed by Cardiology.    5. Angina, class III  Chronic. Stable with current regimen. Followed by Cardiology.    6. Bilateral carotid artery disease  Chronic. Stable with current regimen. Followed by Cardiology.    7. Claudication of left lower extremity  Chronic. Stable with current regimen. Followed by Cardiology.    8. Atherosclerosis of native coronary artery of native heart with angina pectoris  Chronic. Stable with current regimen. Followed by Cardiology.    9. PVD (peripheral vascular disease)  Chronic. Stable with current regimen. Followed by Cardiology.    10. Type 2 diabetes mellitus with stage 3 chronic kidney disease, without long-term current use of insulin  Component      Latest Ref Rng & Units 12/5/2017   Hemoglobin A1C      4.0 - 5.6 % 5.5   Chronic. Stable with current regimen. Followed by PCP.    11. Type 2 diabetes mellitus with diabetic peripheral angiopathy without gangrene, without long-term current use of insulin  Component      Latest Ref Rng & Units 12/5/2017   Hemoglobin A1C      4.0 - 5.6 % 5.5   Chronic. Stable with current regimen. Followed by PCP.    12. Obesity, diabetes, and hypertension  syndrome  Chronic. Stable with current regimen. Followed by PCP.    13. Hyperparathyroidism due to renal insufficiency  Chronic. Stable. Followed by Nephrology.    14. Anemia of chronic renal failure, stage 3 (moderate)  Chronic. Stable. Followed by Nephrology.    15. Hypertensive kidney disease with chronic kidney disease stage III  Chronic. Stable. Followed by Nephrology.    16. History of colon resection  Stable. Followed by PCP.    17. Long term current use of anticoagulant therapy  Chronic. Stable. Followed by Coumadin Clinic.    18. H/O mechanical aortic valve replacement  Stable with current regimen. Followed by Cardiology.    19. History of aortic valve repair  Stable with current regimen. Followed by Cardiology.    20. Hyperlipidemia, unspecified hyperlipidemia type  Chronic. Stable with current regimen. Followed by PCP.    21. Essential hypertension  Chronic. Stable with current regimen. Followed by PCP.    22. Left ventricular diastolic dysfunction with preserved systolic function  Chronic. Stable with current regimen. Followed by Cardiology.    23. Pulmonary heart disease  Chronic. Stable with current regimen. Followed by Cardiology.      Provided Dariel with a 5-10 year written screening schedule and personal prevention plan. Recommendations were developed using the USPSTF age appropriate recommendations. Education, counseling, and referrals were provided as needed. After Visit Summary printed and given to patient which includes a list of additional screenings\tests needed.    Follow-up for follow up with Primary Care Provider as instructed, ;sooner if problems, HRA in 1 year.    CORBY Castillo

## 2018-02-20 NOTE — PATIENT INSTRUCTIONS
Counseling and Referral of Other Preventative  (Italic type indicates deductible and co-insurance are waived)    Patient Name: Dariel Urbina  Today's Date: 2/20/2018    Health Maintenance       Date Due Completion Date    Foot Exam 02/09/2018 2/9/2017 (Done)    Override on 2/9/2017: Done (Done by pcp)    Hemoglobin A1c 06/05/2018 12/5/2017    Eye Exam 06/05/2018 6/5/2017    Lipid Panel 12/05/2018 12/5/2017    TETANUS VACCINE 05/25/2026 5/25/2016        Orders Placed This Encounter   Procedures    Ambulatory Referral to Podiatry     The following information is provided to all patients.  This information is to help you find resources for any of the problems found today that may be affecting your health:                Living healthy guide: www.[facility].gov      Understanding Diabetes: www.diabetes.org      Eating healthy: www.cdc.gov/healthyweight      [facility] home safety checklist: www.cdc.gov/steadi/patient.html      Agency on Aging: www.goea.[place].[facility]      Alcoholics anonymous (AA): www.aa.org      Physical Activity: www.ra.nih.gov/su6jbff      Tobacco use: www.quitwithusla.org

## 2018-02-20 NOTE — PROGRESS NOTES
Quick follow up for low INR on 1/31. INR within therapeutic range today. Patient denies any bleeding, bruising, or other changes that would affect warfarin therapy. Patient is stable on current dose. Will maintain and follow up in 4 weeks. Advised patient to notify us of any changes or concerns.

## 2018-02-20 NOTE — PROGRESS NOTES
Pt seen by Yaneth DUFFY . I have reviewed her initial findings and agree with her assessment and plan

## 2018-03-05 ENCOUNTER — OFFICE VISIT (OUTPATIENT)
Dept: PODIATRY | Facility: CLINIC | Age: 78
End: 2018-03-05
Payer: MEDICARE

## 2018-03-05 VITALS
HEIGHT: 64 IN | HEART RATE: 52 BPM | SYSTOLIC BLOOD PRESSURE: 126 MMHG | DIASTOLIC BLOOD PRESSURE: 56 MMHG | BODY MASS INDEX: 31.92 KG/M2 | WEIGHT: 187 LBS

## 2018-03-05 DIAGNOSIS — E11.51 TYPE II DIABETES MELLITUS WITH PERIPHERAL CIRCULATORY DISORDER: Primary | ICD-10-CM

## 2018-03-05 DIAGNOSIS — B35.3 TINEA PEDIS OF LEFT FOOT: ICD-10-CM

## 2018-03-05 PROCEDURE — 99999 PR PBB SHADOW E&M-EST. PATIENT-LVL III: CPT | Mod: PBBFAC,,, | Performed by: PODIATRIST

## 2018-03-05 PROCEDURE — 3074F SYST BP LT 130 MM HG: CPT | Mod: S$GLB,,, | Performed by: PODIATRIST

## 2018-03-05 PROCEDURE — 99499 UNLISTED E&M SERVICE: CPT | Mod: S$GLB,,, | Performed by: PODIATRIST

## 2018-03-05 PROCEDURE — 3078F DIAST BP <80 MM HG: CPT | Mod: S$GLB,,, | Performed by: PODIATRIST

## 2018-03-05 PROCEDURE — 99204 OFFICE O/P NEW MOD 45 MIN: CPT | Mod: S$GLB,,, | Performed by: PODIATRIST

## 2018-03-05 RX ORDER — CLOTRIMAZOLE AND BETAMETHASONE DIPROPIONATE 10; .64 MG/G; MG/G
CREAM TOPICAL DAILY
Qty: 45 G | Refills: 1 | Status: SHIPPED | OUTPATIENT
Start: 2018-03-05 | End: 2020-01-14

## 2018-03-05 NOTE — PROGRESS NOTES
Chief Complaint   Patient presents with    Diabetes Mellitus     Alberto Langston 1/4/18    Diabetic Foot Exam    Routine Foot Care              HPI:   The patient is a 77 y.o.  male  who presents for a diabetic foot exam.     Patient reports no presence of abnormal sensation to the feet .    History of diabetic foot ulcers: none   History of foot surgery: none.     Shoes worn today:   Slip on soft casual shoes.   He states that he was seen in Internal Medicine and that they were concerned about a something in between his toes.  He states that it does not bother him and he is not sure how it got there.       Primary care doctor is: Devon Langston MD  Chief Complaint   Patient presents with    Diabetes Mellitus     Alberto Langston 1/4/18    Diabetic Foot Exam    Routine Foot Care          Past Medical History:   Diagnosis Date    Bilateral carotid artery disease 2/9/2017    Bleeding     CAD (coronary atherosclerotic disease) 9/11/2012    Cataract     Chronic kidney disease     DM (diabetes mellitus) 9/26/2012    History of gout 9/26/2012    Hyperlipidemia     Hypertension     Mechanical heart valve present     Renal insufficiency 9/11/2012    Type II or unspecified type diabetes mellitus without mention of complication, not stated as uncontrolled            Current Outpatient Prescriptions on File Prior to Visit   Medication Sig Dispense Refill    allopurinol (ZYLOPRIM) 100 MG tablet TAKE 1 TABLET ONE TIME DAILY 90 tablet 4    atorvastatin (LIPITOR) 80 MG tablet TAKE 1 TABLET ONE TIME DAILY 90 tablet 3    fenofibrate micronized (LOFIBRA) 200 MG Cap Take 200 mg by mouth daily with breakfast.      ferrous sulfate 325 (65 FE) MG EC tablet Take 1 tablet (325 mg total) by mouth once daily. 90 tablet 4    fosinopril (MONOPRIL) 40 MG tablet TAKE 1 TABLET ONE TIME DAILY 90 tablet 4    furosemide (LASIX) 20 MG tablet TAKE 1 TABLET ONE TIME DAILY 90 tablet 4    glimepiride (AMARYL) 1 MG tablet Take 1 tablet  (1 mg total) by mouth every morning. 90 tablet 2    metoprolol tartrate (LOPRESSOR) 50 MG tablet Take 1 tablet (50 mg total) by mouth once daily. 90 tablet 3    nifedipine (PROCARDIA-XL) 30 MG (OSM) 24 hr tablet Take 1 tablet (30 mg total) by mouth once daily. 30 tablet 11    nitroGLYCERIN (NITROSTAT) 0.4 MG SL tablet Place 1 tablet (0.4 mg total) under the tongue every 5 (five) minutes as needed. As needed for chest pain 90 tablet 4    omega-3 acid ethyl esters (LOVAZA) 1 gram capsule Take 2 capsules (2 g total) by mouth 2 (two) times daily. 360 capsule 5    ranitidine (ZANTAC) 300 MG tablet Take 1 tablet (300 mg total) by mouth every evening. 90 tablet 3    warfarin (COUMADIN) 5 MG tablet Take 1 tablet (5 mg total) by mouth Daily. Current Dose (7.5 mg on Mon, Wed, Fri; 5 mg all other days) 120 tablet 3    warfarin (COUMADIN) 5 MG tablet TAKE 1 AND 1/2 TABLETS (7.5 MG) ON MONDAY, WEDNESDAY, FRIDAY AND 1 TABLET (5 MG) ALL OTHER DAYS (CURRENT DOSE) 111 tablet 3    isosorbide mononitrate (IMDUR) 120 MG 24 hr tablet Take 1 tablet (120 mg total) by mouth once daily. 90 tablet 4     No current facility-administered medications on file prior to visit.            Review of patient's allergies indicates:  No Known Allergies        Social History     Social History    Marital status:      Spouse name: Ameena    Number of children: 3    Years of education: N/A     Occupational History    retired      Social History Main Topics    Smoking status: Former Smoker     Packs/day: 1.00     Years: 20.00     Quit date: 9/11/1980    Smokeless tobacco: Never Used    Alcohol use No    Drug use: No    Sexual activity: No     Other Topics Concern    Not on file     Social History Narrative    No narrative on file           ROS:  General ROS: negative  Respiratory ROS: no cough, shortness of breath, or wheezing  Cardiovascular ROS: no chest pain or dyspnea on exertion  Musculoskeletal ROS: negative  Neurological  ROS:   negative for - impaired coordination/balance or numbness/tingling  Dermatological ROS: negative      LAST HbA1c:   Hemoglobin A1C   Date Value Ref Range Status   12/05/2017 5.5 4.0 - 5.6 % Final     Comment:     According to ADA guidelines, hemoglobin A1c <7.0% represents  optimal control in non-pregnant diabetic patients. Different  metrics may apply to specific patient populations.   Standards of Medical Care in Diabetes-2016.  For the purpose of screening for the presence of diabetes:  <5.7%     Consistent with the absence of diabetes  5.7-6.4%  Consistent with increasing risk for diabetes   (prediabetes)  >or=6.5%  Consistent with diabetes  Currently, no consensus exists for use of hemoglobin A1c  for diagnosis of diabetes for children.  This Hemoglobin A1c assay has significant interference with fetal   hemoglobin   (HbF). The results are invalid for patients with abnormal amounts of   HbF,   including those with known Hereditary Persistence   of Fetal Hemoglobin. Heterozygous hemoglobin variants (HbAS, HbAC,   HbAD, HbAE, HbA2) do not significantly interfere with this assay;   however, presence of multiple variants in a sample may impact the %   interference.     05/24/2017 5.7 4.5 - 6.2 % Final     Comment:     According to ADA guidelines, hemoglobin A1C <7.0% represents  optimal control in non-pregnant diabetic patients.  Different  metrics may apply to specific populations.   Standards of Medical Care in Diabetes - 2016.  For the purpose of screening for the presence of diabetes:  <5.7%     Consistent with the absence of diabetes  5.7-6.4%  Consistent with increasing risk for diabetes   (prediabetes)  >or=6.5%  Consistent with diabetes  Currently no consensus exists for use of hemoglobin A1C  for diagnosis of diabetes for children.     02/02/2017 5.8 4.5 - 6.2 % Final     Comment:     According to ADA guidelines, hemoglobin A1C <7.0% represents  optimal control in non-pregnant diabetic patients.   "Different  metrics may apply to specific populations.   Standards of Medical Care in Diabetes - 2016.  For the purpose of screening for the presence of diabetes:  <5.7%     Consistent with the absence of diabetes  5.7-6.4%  Consistent with increasing risk for diabetes   (prediabetes)  >or=6.5%  Consistent with diabetes  Currently no consensus exists for use of hemoglobin A1C  for diagnosis of diabetes for children.             EXAM:   Vitals:    03/05/18 0803   BP: (!) 126/56   Pulse: (!) 52   Weight: 84.8 kg (187 lb)   Height: 5' 4" (1.626 m)       General: alert, no distress, cooperative      Vasc:  PT and DP pulses:  Non palpable.   Capillary refill 3 secs.     Neurological:  Light touch, proprioception, and sharp/dull sensation are all intact bilaterally. Protective threshold with the [place]-Wienstein monofilament is intact bilaterally.    No sensorimotor deficits    Musculoskeletal:  Muscle strength is 5/5 in all groups bilaterally.  Metatarsophalangeal, subtalar, and ankle range of motion are within normal limits without crepitus bilaterally.    No overall gross deformity.     Dermatological:  There is intact skin bilateral. No open wounds. Toenails normal and short.  There is soft slightly macerated hyperkeratosis in the left 4th webspace, with a slight green discoloration.  No open wounds or break in the skin.  No drainage.  No redness or swelling noted to the area.  No tenderness to palpation.   Negative with Wood's lamp.                ASSESSMENT/PLAN:      I counseled the patient on the patient's conditions, their implications and medical management.       Type II diabetes mellitus with peripheral circulatory disorder    Tinea pedis of left foot  -     clotrimazole-betamethasone 1-0.05% (LOTRISONE) cream; Apply topically once daily. in between the 4th and 5th toes left foot.  Dispense: 45 g; Refill: 1      -     No acute infection and likely unrelated to diabetes.   Continue to monitor.   Maintain good " foot hygiene.       follow up annual for foot exam or sooner if concerned.           Alley Arzola DPM  NPI: [id number]

## 2018-03-05 NOTE — LETTER
March 5, 2018      Kaylie Berman, FNP  [address]  [place] [address]           [facility] - Podiatry  [address]  [facility] [address]  Phone: [phone]          Patient: Dariel Urbina   MR Number: [id number]   YOB: 1940   Date of Visit: 3/5/2018       Dear Kaylie Berman:    Thank you for referring Dariel Urbina to me for evaluation. Attached you will find relevant portions of my assessment and plan of care.    If you have questions, please do not hesitate to call me. I look forward to following Dariel Urbina along with you.    Sincerely,    Alley Arzola DPM    Enclosure  CC:  No Recipients    If you would like to receive this communication electronically, please contact [email or web address] or [phone] to request more information on [employer] access.    For providers and/or their staff who would like to refer a patient to [facility], please contact us through our one-stop-shop provider referral line, [facility] , at [phone].    If you feel you have received this communication in error or would no longer like to receive these types of communications, please e-mail [email or web address]

## 2018-03-05 NOTE — PATIENT INSTRUCTIONS
How to Check Your Feet    Below are tips to help you look for foot problems. Try to check your feet at the same time each day, such as when you get out of bed in the morning.    · Check the top of each foot. The tops of toes, back of the heel, and outer edge of the foot can get a lot of rubbing from poor-fitting shoes.    · Check the bottom of each foot. Daily wear and tear often leads to problems at pressure spots.    · Check the toes and nails. Fungal infections often occur between toes. Toenail problems can also be a sign of fungal infections or lead to breaks in the skin.    · Check your shoes, too. Loose objects inside a shoe can injure the foot. Use your hand to feel inside your shoes for things like josefina, loose stitching, or rough areas that could irritate your skin.        Diabetic Foot Care    Diabetes can lead to a number of different foot complications. Fortunately, most of these complications can be prevented with a little extra foot care. If diabetes is not well controlled, the high blood sugar can cause damage to blood vessels and result in poor circulation to the foot. When the skin does not get enough blood flow, it becomes prone to pressure sores and ulcers, which heal slowly.  High blood sugar can also damage nerves, interfering with the ability to feel pain and pressure. When you cant feel your foot normally, it is easy to injure your skin, bones and joints without knowing it. For these reasons diabetes increases the risk of fungal infections, bunions and ulcers. Deep ulcers can lead to bone infection. Gangrene is the most serious foot complication of diabetes. It usually occurs on the tips of the toes as blacked areas of skin. The black area is dead tissue. In severe cases, gangrene spreads to involve the entire toe, other toes and the entire foot. Foot or toe amputation may be required. Good foot care and blood sugar control can prevent this.    Home Care  1. Wear comfortable, proper fitting  shoes.  2. Wash your feet daily with warm water and mild soap.  3. After drying, apply a moisturizing cream or lotion.  4. Check your feet daily for skin breaks, blisters, swelling, or redness. Look between your toes also.  5. Wear cotton socks and change them every day.  6. Trim toe nails carefully and do not cut your cuticles.  7. Strive to keep your blood sugar under control with a combination of medicines, diet and activity.  8. If you smoke and have diabetes, it is very important that you stop. Smoking reduces blood flow to your foot.  9. Avoid activities that increase your risk of foot injury:  · Do not walk barefoot.  · Do not use heating pads or hot water bottles on your feet.  · Do not put your foot in a hot tub without first checking the temperature with your hand.  10) Schedule yearly foot exams.    Follow Up  with your doctor or as advised by our staff. Report any cut, puncture, scrape, other injury, blister, ingrown toenail or ulcer on your foot.    Get Prompt Medical Attention  if any of the following occur:  -- Open ulcer with pus draining from the wound  -- Increasing foot or leg pain  -- New areas of redness or swelling or tender areas of the foot    © 3176-2345 [employer]. [address] [place] [address], [place] [address]. All rights reserved. This information is not intended as a substitute for professional medical care. Always follow your healthcare professional's instructions.

## 2018-03-19 ENCOUNTER — HOSPITAL ENCOUNTER (INPATIENT)
Facility: HOSPITAL | Age: 78
LOS: 9 days | Discharge: HOME | DRG: 682 | End: 2018-03-28
Attending: EMERGENCY MEDICINE | Admitting: HOSPITALIST
Payer: MEDICARE

## 2018-03-19 DIAGNOSIS — E11.69 OBESITY, DIABETES, AND HYPERTENSION SYNDROME: ICD-10-CM

## 2018-03-19 DIAGNOSIS — E11.51 TYPE 2 DIABETES MELLITUS WITH DIABETIC PERIPHERAL ANGIOPATHY WITHOUT GANGRENE, UNSPECIFIED LONG TERM INSULIN USE STATUS: ICD-10-CM

## 2018-03-19 DIAGNOSIS — N17.9 ACUTE RENAL FAILURE SUPERIMPOSED ON STAGE 3 CHRONIC KIDNEY DISEASE, UNSPECIFIED ACUTE RENAL FAILURE TYPE: ICD-10-CM

## 2018-03-19 DIAGNOSIS — N17.9 ACUTE RENAL FAILURE SUPERIMPOSED ON STAGE 3 CHRONIC KIDNEY DISEASE: ICD-10-CM

## 2018-03-19 DIAGNOSIS — Z95.2 H/O MECHANICAL AORTIC VALVE REPLACEMENT: ICD-10-CM

## 2018-03-19 DIAGNOSIS — E78.2 MIXED HYPERLIPIDEMIA: ICD-10-CM

## 2018-03-19 DIAGNOSIS — E87.5 HYPERKALEMIA: ICD-10-CM

## 2018-03-19 DIAGNOSIS — N25.81 HYPERPARATHYROIDISM DUE TO RENAL INSUFFICIENCY: ICD-10-CM

## 2018-03-19 DIAGNOSIS — N18.30 ACUTE RENAL FAILURE SUPERIMPOSED ON STAGE 3 CHRONIC KIDNEY DISEASE: ICD-10-CM

## 2018-03-19 DIAGNOSIS — R04.0 BLEEDING NOSE: ICD-10-CM

## 2018-03-19 DIAGNOSIS — I10 ESSENTIAL HYPERTENSION: ICD-10-CM

## 2018-03-19 DIAGNOSIS — N18.30 ACUTE RENAL FAILURE WITH ACUTE TUBULAR NECROSIS SUPERIMPOSED ON STAGE 3 CHRONIC KIDNEY DISEASE: ICD-10-CM

## 2018-03-19 DIAGNOSIS — I73.9 PVD (PERIPHERAL VASCULAR DISEASE): ICD-10-CM

## 2018-03-19 DIAGNOSIS — N18.30 ANEMIA OF CHRONIC RENAL FAILURE, STAGE 3 (MODERATE): ICD-10-CM

## 2018-03-19 DIAGNOSIS — I25.10 CORONARY ARTERY DISEASE, ANGINA PRESENCE UNSPECIFIED, UNSPECIFIED VESSEL OR LESION TYPE, UNSPECIFIED WHETHER NATIVE OR TRANSPLANTED HEART: ICD-10-CM

## 2018-03-19 DIAGNOSIS — K21.9 GASTROESOPHAGEAL REFLUX DISEASE WITHOUT ESOPHAGITIS: ICD-10-CM

## 2018-03-19 DIAGNOSIS — I49.9 ARRHYTHMIA: ICD-10-CM

## 2018-03-19 DIAGNOSIS — N18.3 ACUTE RENAL FAILURE SUPERIMPOSED ON STAGE 3 CHRONIC KIDNEY DISEASE, UNSPECIFIED ACUTE RENAL FAILURE TYPE: ICD-10-CM

## 2018-03-19 DIAGNOSIS — K52.9 SEVERE DIARRHEA: ICD-10-CM

## 2018-03-19 DIAGNOSIS — E87.5 ACUTE HYPERKALEMIA: ICD-10-CM

## 2018-03-19 DIAGNOSIS — I15.2 OBESITY, DIABETES, AND HYPERTENSION SYNDROME: ICD-10-CM

## 2018-03-19 DIAGNOSIS — N17.0 ACUTE RENAL FAILURE WITH ACUTE TUBULAR NECROSIS SUPERIMPOSED ON STAGE 3 CHRONIC KIDNEY DISEASE: ICD-10-CM

## 2018-03-19 DIAGNOSIS — R19.7 DIARRHEA, UNSPECIFIED TYPE: ICD-10-CM

## 2018-03-19 DIAGNOSIS — I25.10 ATHEROSCLEROSIS OF NATIVE CORONARY ARTERY OF NATIVE HEART, ANGINA PRESENCE UNSPECIFIED: ICD-10-CM

## 2018-03-19 DIAGNOSIS — E11.59 OBESITY, DIABETES, AND HYPERTENSION SYNDROME: ICD-10-CM

## 2018-03-19 DIAGNOSIS — N17.9 ACUTE RENAL FAILURE, UNSPECIFIED ACUTE RENAL FAILURE TYPE: Primary | ICD-10-CM

## 2018-03-19 DIAGNOSIS — E87.20 METABOLIC ACIDOSIS WITH NORMAL ANION GAP AND BICARBONATE LOSSES: ICD-10-CM

## 2018-03-19 DIAGNOSIS — R07.9 CHEST PAIN: ICD-10-CM

## 2018-03-19 DIAGNOSIS — D63.1 ANEMIA OF CHRONIC RENAL FAILURE, STAGE 3 (MODERATE): ICD-10-CM

## 2018-03-19 DIAGNOSIS — I51.89 LEFT VENTRICULAR DIASTOLIC DYSFUNCTION WITH PRESERVED SYSTOLIC FUNCTION: ICD-10-CM

## 2018-03-19 DIAGNOSIS — R79.89 TROPONIN LEVEL ELEVATED: ICD-10-CM

## 2018-03-19 DIAGNOSIS — E66.9 OBESITY, DIABETES, AND HYPERTENSION SYNDROME: ICD-10-CM

## 2018-03-19 DIAGNOSIS — N18.30 CKD (CHRONIC KIDNEY DISEASE), STAGE III: ICD-10-CM

## 2018-03-19 DIAGNOSIS — Z95.2 PRESENCE OF PROSTHETIC HEART VALVE: ICD-10-CM

## 2018-03-19 LAB
ALBUMIN SERPL BCP-MCNC: 4.2 G/DL
ALLENS TEST: ABNORMAL
ALP SERPL-CCNC: 78 U/L
ALT SERPL W/O P-5'-P-CCNC: 16 U/L
ANION GAP SERPL CALC-SCNC: 13 MMOL/L
AST SERPL-CCNC: 20 U/L
BASOPHILS # BLD AUTO: 0.03 K/UL
BASOPHILS NFR BLD: 0.3 %
BILIRUB SERPL-MCNC: 0.6 MG/DL
BILIRUB UR QL STRIP: NEGATIVE
BUN SERPL-MCNC: 85 MG/DL
C DIFF GDH STL QL: NEGATIVE
C DIFF TOX A+B STL QL IA: NEGATIVE
CALCIUM SERPL-MCNC: 10.2 MG/DL
CHLORIDE SERPL-SCNC: 109 MMOL/L
CLARITY UR REFRACT.AUTO: ABNORMAL
CO2 SERPL-SCNC: 14 MMOL/L
COLOR UR AUTO: YELLOW
CREAT SERPL-MCNC: 6.6 MG/DL
CREAT UR-MCNC: 322 MG/DL
DIFFERENTIAL METHOD: ABNORMAL
EOSINOPHIL # BLD AUTO: 0.2 K/UL
EOSINOPHIL NFR BLD: 2.1 %
ERYTHROCYTE [DISTWIDTH] IN BLOOD BY AUTOMATED COUNT: 14 %
EST. GFR  (AFRICAN AMERICAN): 8.6 ML/MIN/1.73 M^2
EST. GFR  (NON AFRICAN AMERICAN): 7.4 ML/MIN/1.73 M^2
ESTIMATED AVG GLUCOSE: 111 MG/DL
GLUCOSE SERPL-MCNC: 73 MG/DL
GLUCOSE UR QL STRIP: NEGATIVE
HBA1C MFR BLD HPLC: 5.5 %
HCO3 UR-SCNC: 15 MMOL/L (ref 24–28)
HCT VFR BLD AUTO: 42.1 %
HGB BLD-MCNC: 13.6 G/DL
HGB UR QL STRIP: NEGATIVE
IMM GRANULOCYTES # BLD AUTO: 0.03 K/UL
IMM GRANULOCYTES NFR BLD AUTO: 0.3 %
INR PPP: 4.4
KETONES UR QL STRIP: NEGATIVE
LACTATE SERPL-SCNC: 0.8 MMOL/L
LDH SERPL L TO P-CCNC: 2.52 MMOL/L (ref 0.5–2.2)
LEUKOCYTE ESTERASE UR QL STRIP: NEGATIVE
LYMPHOCYTES # BLD AUTO: 2.3 K/UL
LYMPHOCYTES NFR BLD: 24.5 %
MCH RBC QN AUTO: 30.4 PG
MCHC RBC AUTO-ENTMCNC: 32.3 G/DL
MCV RBC AUTO: 94 FL
MONOCYTES # BLD AUTO: 0.9 K/UL
MONOCYTES NFR BLD: 9.2 %
NEUTROPHILS # BLD AUTO: 5.9 K/UL
NEUTROPHILS NFR BLD: 63.6 %
NITRITE UR QL STRIP: NEGATIVE
NRBC BLD-RTO: 0 /100 WBC
PCO2 BLDA: 35.7 MMHG (ref 35–45)
PH SMN: 7.23 [PH] (ref 7.35–7.45)
PH UR STRIP: 5 [PH] (ref 5–8)
PLATELET # BLD AUTO: 158 K/UL
PMV BLD AUTO: 11 FL
PO2 BLDA: 43 MMHG (ref 40–60)
POC BE: -12 MMOL/L
POC SATURATED O2: 70 % (ref 95–100)
POC TCO2: 16 MMOL/L (ref 24–29)
POCT GLUCOSE: 104 MG/DL (ref 70–110)
POTASSIUM SERPL-SCNC: 5 MMOL/L
PROCALCITONIN SERPL IA-MCNC: 0.2 NG/ML
PROT SERPL-MCNC: 8.7 G/DL
PROT UR QL STRIP: NEGATIVE
PROTHROMBIN TIME: 42.8 SEC
RBC # BLD AUTO: 4.48 M/UL
SAMPLE: ABNORMAL
SITE: ABNORMAL
SODIUM SERPL-SCNC: 136 MMOL/L
SODIUM UR-SCNC: 33 MMOL/L
SP GR UR STRIP: 1.01 (ref 1–1.03)
URN SPEC COLLECT METH UR: ABNORMAL
UROBILINOGEN UR STRIP-ACNC: NEGATIVE EU/DL
UUN UR-MCNC: 426 MG/DL
WBC # BLD AUTO: 9.35 K/UL
WBC #/AREA STL HPF: NORMAL /[HPF]

## 2018-03-19 PROCEDURE — 87046 STOOL CULTR AEROBIC BACT EA: CPT

## 2018-03-19 PROCEDURE — 96360 HYDRATION IV INFUSION INIT: CPT

## 2018-03-19 PROCEDURE — 25000003 PHARM REV CODE 250: Performed by: STUDENT IN AN ORGANIZED HEALTH CARE EDUCATION/TRAINING PROGRAM

## 2018-03-19 PROCEDURE — 85025 COMPLETE CBC W/AUTO DIFF WBC: CPT

## 2018-03-19 PROCEDURE — 20600001 HC STEP DOWN PRIVATE ROOM

## 2018-03-19 PROCEDURE — 84540 ASSAY OF URINE/UREA-N: CPT

## 2018-03-19 PROCEDURE — 80053 COMPREHEN METABOLIC PANEL: CPT

## 2018-03-19 PROCEDURE — 82803 BLOOD GASES ANY COMBINATION: CPT

## 2018-03-19 PROCEDURE — 81003 URINALYSIS AUTO W/O SCOPE: CPT

## 2018-03-19 PROCEDURE — 83036 HEMOGLOBIN GLYCOSYLATED A1C: CPT

## 2018-03-19 PROCEDURE — 84300 ASSAY OF URINE SODIUM: CPT

## 2018-03-19 PROCEDURE — 93010 ELECTROCARDIOGRAM REPORT: CPT | Mod: ,,, | Performed by: INTERNAL MEDICINE

## 2018-03-19 PROCEDURE — 82570 ASSAY OF URINE CREATININE: CPT

## 2018-03-19 PROCEDURE — 87449 NOS EACH ORGANISM AG IA: CPT

## 2018-03-19 PROCEDURE — 99285 EMERGENCY DEPT VISIT HI MDM: CPT | Mod: 25

## 2018-03-19 PROCEDURE — 99284 EMERGENCY DEPT VISIT MOD MDM: CPT | Mod: ,,, | Performed by: EMERGENCY MEDICINE

## 2018-03-19 PROCEDURE — 96361 HYDRATE IV INFUSION ADD-ON: CPT

## 2018-03-19 PROCEDURE — 83605 ASSAY OF LACTIC ACID: CPT

## 2018-03-19 PROCEDURE — 85610 PROTHROMBIN TIME: CPT

## 2018-03-19 PROCEDURE — 99223 1ST HOSP IP/OBS HIGH 75: CPT | Mod: AI,GC,, | Performed by: HOSPITALIST

## 2018-03-19 PROCEDURE — 89055 LEUKOCYTE ASSESSMENT FECAL: CPT

## 2018-03-19 PROCEDURE — 87045 FECES CULTURE AEROBIC BACT: CPT

## 2018-03-19 PROCEDURE — 87040 BLOOD CULTURE FOR BACTERIA: CPT

## 2018-03-19 PROCEDURE — 94761 N-INVAS EAR/PLS OXIMETRY MLT: CPT

## 2018-03-19 PROCEDURE — 84145 PROCALCITONIN (PCT): CPT

## 2018-03-19 PROCEDURE — 84100 ASSAY OF PHOSPHORUS: CPT

## 2018-03-19 PROCEDURE — 87427 SHIGA-LIKE TOXIN AG IA: CPT | Mod: 59

## 2018-03-19 PROCEDURE — 25000003 PHARM REV CODE 250: Performed by: EMERGENCY MEDICINE

## 2018-03-19 RX ORDER — FERROUS SULFATE 325(65) MG
325 TABLET, DELAYED RELEASE (ENTERIC COATED) ORAL DAILY
Status: DISCONTINUED | OUTPATIENT
Start: 2018-03-19 | End: 2018-03-28 | Stop reason: HOSPADM

## 2018-03-19 RX ORDER — SODIUM CHLORIDE, SODIUM LACTATE, POTASSIUM CHLORIDE, CALCIUM CHLORIDE 600; 310; 30; 20 MG/100ML; MG/100ML; MG/100ML; MG/100ML
INJECTION, SOLUTION INTRAVENOUS CONTINUOUS
Status: DISCONTINUED | OUTPATIENT
Start: 2018-03-19 | End: 2018-03-19

## 2018-03-19 RX ORDER — ISOSORBIDE MONONITRATE 30 MG/1
120 TABLET, EXTENDED RELEASE ORAL DAILY
Status: DISCONTINUED | OUTPATIENT
Start: 2018-03-19 | End: 2018-03-28 | Stop reason: HOSPADM

## 2018-03-19 RX ORDER — IBUPROFEN 200 MG
24 TABLET ORAL
Status: DISCONTINUED | OUTPATIENT
Start: 2018-03-19 | End: 2018-03-28 | Stop reason: HOSPADM

## 2018-03-19 RX ORDER — SODIUM,POTASSIUM PHOSPHATES 280-250MG
2 POWDER IN PACKET (EA) ORAL
Status: DISCONTINUED | OUTPATIENT
Start: 2018-03-19 | End: 2018-03-28 | Stop reason: HOSPADM

## 2018-03-19 RX ORDER — GLUCAGON 1 MG
1 KIT INJECTION
Status: DISCONTINUED | OUTPATIENT
Start: 2018-03-19 | End: 2018-03-28 | Stop reason: HOSPADM

## 2018-03-19 RX ORDER — POTASSIUM CHLORIDE 20 MEQ/15ML
40 SOLUTION ORAL
Status: DISCONTINUED | OUTPATIENT
Start: 2018-03-19 | End: 2018-03-28 | Stop reason: HOSPADM

## 2018-03-19 RX ORDER — METOPROLOL TARTRATE 50 MG/1
50 TABLET ORAL DAILY
Status: DISCONTINUED | OUTPATIENT
Start: 2018-03-19 | End: 2018-03-19

## 2018-03-19 RX ORDER — LOPERAMIDE HYDROCHLORIDE 2 MG/1
2 CAPSULE ORAL 4 TIMES DAILY PRN
Status: ON HOLD | COMMUNITY
End: 2018-03-28 | Stop reason: HOSPADM

## 2018-03-19 RX ORDER — WARFARIN SODIUM 5 MG/1
5 TABLET ORAL DAILY
Status: DISCONTINUED | OUTPATIENT
Start: 2018-03-19 | End: 2018-03-19

## 2018-03-19 RX ORDER — RAMELTEON 8 MG/1
8 TABLET ORAL NIGHTLY PRN
Status: DISCONTINUED | OUTPATIENT
Start: 2018-03-19 | End: 2018-03-28 | Stop reason: HOSPADM

## 2018-03-19 RX ORDER — FOSINOPRIL SODIUM 20 MG/1
40 TABLET ORAL DAILY
Status: DISCONTINUED | OUTPATIENT
Start: 2018-03-19 | End: 2018-03-19 | Stop reason: SINTOL

## 2018-03-19 RX ORDER — ACETAMINOPHEN 650 MG/1
650 SUPPOSITORY RECTAL EVERY 4 HOURS PRN
Status: DISCONTINUED | OUTPATIENT
Start: 2018-03-19 | End: 2018-03-28 | Stop reason: HOSPADM

## 2018-03-19 RX ORDER — LANOLIN ALCOHOL/MO/W.PET/CERES
800 CREAM (GRAM) TOPICAL
Status: DISCONTINUED | OUTPATIENT
Start: 2018-03-19 | End: 2018-03-28 | Stop reason: HOSPADM

## 2018-03-19 RX ORDER — ALLOPURINOL 100 MG/1
100 TABLET ORAL DAILY
Status: DISCONTINUED | OUTPATIENT
Start: 2018-03-19 | End: 2018-03-28 | Stop reason: HOSPADM

## 2018-03-19 RX ORDER — METOPROLOL TARTRATE 25 MG/1
25 TABLET, FILM COATED ORAL 2 TIMES DAILY
Status: DISCONTINUED | OUTPATIENT
Start: 2018-03-20 | End: 2018-03-23

## 2018-03-19 RX ORDER — FAMOTIDINE 20 MG/1
20 TABLET, FILM COATED ORAL 2 TIMES DAILY
Status: DISCONTINUED | OUTPATIENT
Start: 2018-03-19 | End: 2018-03-19

## 2018-03-19 RX ORDER — NIFEDIPINE 30 MG/1
30 TABLET, EXTENDED RELEASE ORAL DAILY
Status: DISCONTINUED | OUTPATIENT
Start: 2018-03-19 | End: 2018-03-22

## 2018-03-19 RX ORDER — ATORVASTATIN CALCIUM 20 MG/1
80 TABLET, FILM COATED ORAL DAILY
Status: DISCONTINUED | OUTPATIENT
Start: 2018-03-19 | End: 2018-03-28 | Stop reason: HOSPADM

## 2018-03-19 RX ORDER — SODIUM CHLORIDE 0.9 % (FLUSH) 0.9 %
5 SYRINGE (ML) INJECTION
Status: DISCONTINUED | OUTPATIENT
Start: 2018-03-19 | End: 2018-03-28 | Stop reason: HOSPADM

## 2018-03-19 RX ORDER — IBUPROFEN 200 MG
16 TABLET ORAL
Status: DISCONTINUED | OUTPATIENT
Start: 2018-03-19 | End: 2018-03-28 | Stop reason: HOSPADM

## 2018-03-19 RX ORDER — INSULIN ASPART 100 [IU]/ML
0-5 INJECTION, SOLUTION INTRAVENOUS; SUBCUTANEOUS
Status: DISCONTINUED | OUTPATIENT
Start: 2018-03-19 | End: 2018-03-28 | Stop reason: HOSPADM

## 2018-03-19 RX ORDER — FAMOTIDINE 20 MG/1
20 TABLET, FILM COATED ORAL DAILY
Status: DISCONTINUED | OUTPATIENT
Start: 2018-03-19 | End: 2018-03-28 | Stop reason: HOSPADM

## 2018-03-19 RX ADMIN — METOPROLOL TARTRATE 50 MG: 50 TABLET ORAL at 09:03

## 2018-03-19 RX ADMIN — FAMOTIDINE 20 MG: 20 TABLET, FILM COATED ORAL at 09:03

## 2018-03-19 RX ADMIN — ALLOPURINOL 100 MG: 100 TABLET ORAL at 09:03

## 2018-03-19 RX ADMIN — FERROUS SULFATE TAB EC 325 MG (65 MG FE EQUIVALENT) 325 MG: 325 (65 FE) TABLET DELAYED RESPONSE at 09:03

## 2018-03-19 RX ADMIN — SODIUM CHLORIDE, POTASSIUM CHLORIDE, SODIUM LACTATE AND CALCIUM CHLORIDE 1000 ML: 600; 310; 30; 20 INJECTION, SOLUTION INTRAVENOUS at 08:03

## 2018-03-19 RX ADMIN — SODIUM CHLORIDE, POTASSIUM CHLORIDE, SODIUM LACTATE AND CALCIUM CHLORIDE: 600; 310; 30; 20 INJECTION, SOLUTION INTRAVENOUS at 04:03

## 2018-03-19 RX ADMIN — NIFEDIPINE 30 MG: 30 TABLET, FILM COATED, EXTENDED RELEASE ORAL at 09:03

## 2018-03-19 RX ADMIN — SODIUM CHLORIDE, POTASSIUM CHLORIDE, SODIUM LACTATE AND CALCIUM CHLORIDE 1000 ML: 600; 310; 30; 20 INJECTION, SOLUTION INTRAVENOUS at 06:03

## 2018-03-19 RX ADMIN — ATORVASTATIN CALCIUM 80 MG: 20 TABLET, FILM COATED ORAL at 09:03

## 2018-03-19 RX ADMIN — ISOSORBIDE MONONITRATE 120 MG: 60 TABLET, EXTENDED RELEASE ORAL at 09:03

## 2018-03-19 NOTE — ASSESSMENT & PLAN NOTE
HYPERPARATHYROIDISM DUE TO RENAL INSUFFICIENCY  ANEMIA OF CHRONIC RENAL FAILURE, STAGE 3    GFR is usually in the 50's in the setting of high blood pressure. Corrected calcium was 10.0 mg/dL.  - Will continue to monitor and treat his acute renal failure.   - Continue ferrous sulfate, 325 mg qd (per outpatient regimen)

## 2018-03-19 NOTE — ED NOTES
Lab draw from IV site, Rt Ac, for VBG. RT notified and aware blood ready. IV flushed 0.9% mL NaCl.

## 2018-03-19 NOTE — PLAN OF CARE
Problem: Patient Care Overview  Goal: Plan of Care Review  Outcome: Ongoing (interventions implemented as appropriate)  Patient is AAOx4, AFVSS. Steady gait, up ad ailyn. Pt reports feeling much better after receiving 2L LR bolus in ED. Urine sample obtained. Continues to have liquid BMs x2.  Pt denies pain or discomfort at this time. LR @ 100mL/hr. Pt reports he does not check BS at home as he has only been diagnosed as borderline diabetic, A1C WNL.  Safety precautions in place. No acute events. POC discussed with pt and wife at bedside, all questions answered. Will continue to monitor.

## 2018-03-19 NOTE — PLAN OF CARE
03/19/18 1022   Discharge Assessment   Assessment Type Discharge Planning Assessment   Confirmed/corrected address and phone number on facesheet? Yes   Assessment information obtained from? Patient;Caregiver;Medical Record  (wife at bedside)   Expected Length of Stay (days) 3   Communicated expected length of stay with patient/caregiver yes   Prior to hospitilization cognitive status: Alert/Oriented   Prior to hospitalization functional status: Independent   Current cognitive status: Alert/Oriented   Current Functional Status: Independent   Lives With spouse   Able to Return to Prior Arrangements yes   Is patient able to care for self after discharge? Yes   Who are your caregiver(s) and their phone number(s)? wife   Patient's perception of discharge disposition home or selfcare   Readmission Within The Last 30 Days no previous admission in last 30 days   Patient currently being followed by outpatient case management? No   Patient currently receives any other outside agency services? No   Equipment Currently Used at Home none   Do you have any problems affording any of your prescribed medications? No   Is the patient taking medications as prescribed? yes   Does the patient have transportation home? Yes   Transportation Available family or friend will provide   Does the patient receive services at the [facility]? No   Discharge Plan A Home with family   Discharge Plan B Home with family;Home Health   Patient/Family In Agreement With Plan yes

## 2018-03-19 NOTE — HPI
Mr Urbina is a 76 yo male with PMH significant for HTN, CKD III, HLD, CAD, DMT2, gout, mechanical aortic valve, diastolic heart failure, and history of left-sided colectomy for diverticulosis who presents with diarrhea of three day duration with sudden onset. Diarrhea is watery nigh on clear without any mucus or blood. Pt c/o greater than 6 episodes of diarrhea per day for the past three days. Associated symptoms include weakness, fatigue and decreased urine output. He has not urinated for the past two days. He denies any abdominal pain or cramps currently. Pt has tried immodium, kaopectate and pepto bismal without any symptomatic benefit. He has never had an episode of diarrhea like this before. Pt cannot attribute this episode of diarrhea to any particular cause.     He denies any recent changes in diet or recent travel. Denies sick contacts, fevers, chills, headache, or confusion. Denies recent antibiotics or hospitalizations. Denies changes to medications. Review of systems was positive for shortness of breath, cough while eating, and decreased urine output. Pt also endorses a decreased appetite over the past two months resulting in an unintentional twenty pound weight loss.

## 2018-03-19 NOTE — ASSESSMENT & PLAN NOTE
Three day history of profuse watery diarrhea with resultant dehydration. Pt's bicarbonate was 14 in the emergency room. Greater than 6 bowel movements per day.   - Lactated ringer's to avoid hyperchloremic metabolic acidosis. Nil peritonitic signs, nil rebound tenderness or guarding.   - C. Diff EIA  - Stool cultures  - KUB flat and erect  - Nil empiric antibiotic  - Nil antimotility agent until infectious etiology is ruled out

## 2018-03-19 NOTE — ASSESSMENT & PLAN NOTE
Home regimen includes fosinopril 40 mg qd, nifedipine 30 mg qd.  - Will hold fosinopril in setting of JIM  - Continue nifedipine.

## 2018-03-19 NOTE — ASSESSMENT & PLAN NOTE
Baseline BUN:Cr is 35:1.3; was 85 and 6.6 on admission. Likely prerenal by history. Denies suprapubic pain and nil pain on palpation. Secondary to dehydration resulting from his severe diarrhea. Will treat with fluid replacement. Bladder scan was negative in the emergency room.   - 2L LR bolus  - Low UOP, will monitor.

## 2018-03-19 NOTE — MEDICAL/APP STUDENT
"HISTORY & PHYSICAL  Hospital Medicine    Team: [facility] HOSP MED 3    PRESENTING HISTORY     Chief Complaint/Reason for Admission: Watery Diarrhea    History of Present Illness:  Mr. Dariel Urbina is a 77 y.o. male who presented with profuse watery diarrhea. Onset of symptoms was {desc; hpi onset:59173} with {Desc; clinical condition:17::"unchanged"} course since that time. Patient denies chest and abdo pain.     Review of Systems:  Constitutional: no fever or chills  Eyes: positive for slight blurriness  ENT: no nasal congestion or sore throat  Respiratory: positive for dyspnea on exertion  Cardiovascular: no chest pain or palpitations  Gastrointestinal: positive for diarrhea  Genitourinary: no hematuria or dysuria  Integument/Breast: no rash or pruritis  Hematologic/Lymphatic: {:[id number]}  Allergy/Immunology: {:070110}  Musculoskeletal: no arthralgias or myalgias  Neurological: positive for dizziness  Behavioral/Psych: no auditory or visual hallucinations  Endocrine: {:[id number]}    PAST HISTORY:     Past Medical History:   Diagnosis Date    Bilateral carotid artery disease 2/9/2017    CAD (coronary atherosclerotic disease) 9/11/2012    Cataract     Chronic kidney disease     DM (diabetes mellitus) 9/26/2012    History of gout 9/26/2012    Hyperlipidemia     Hypertension     Mechanical heart valve present        Past Surgical History:   Procedure Laterality Date    CARDIAC CATHETERIZATION      CARDIAC VALVE REPLACEMENT      CARDIAC VALVE SURGERY      COLON SURGERY      COLONOSCOPY N/A 3/31/2017    Procedure: COLONOSCOPY;  Surgeon: Bruno Raymond MD;  Location: [address];  Service: Endoscopy;  Laterality: N/A;  Patient's wife requesting date.    CORONARY ANGIOPLASTY      CORONARY ARTERY BYPASS GRAFT      HERNIA REPAIR      SPINE SURGERY      VASECTOMY         Family History   Problem Relation Age of Onset    Heart failure Mother     Heart disease Mother     Heart failure Father  "    Heart disease Father     Alcohol abuse Father     Heart failure Brother     Heart disease Brother     Diabetes Brother     No Known Problems Sister     No Known Problems Maternal Grandmother     No Known Problems Maternal Grandfather     No Known Problems Paternal Grandmother     No Known Problems Paternal Grandfather     Heart disease Sister     No Known Problems Maternal Aunt     No Known Problems Maternal Uncle     No Known Problems Paternal Aunt     No Known Problems Paternal Uncle     Amblyopia Neg Hx     Blindness Neg Hx     Cancer Neg Hx     Cataracts Neg Hx     Glaucoma Neg Hx     Hypertension Neg Hx     Macular degeneration Neg Hx     Retinal detachment Neg Hx     Strabismus Neg Hx     Stroke Neg Hx     Thyroid disease Neg Hx     Anemia Neg Hx     Arrhythmia Neg Hx     Asthma Neg Hx     Clotting disorder Neg Hx     Fainting Neg Hx     Heart attack Neg Hx     Hyperlipidemia Neg Hx     Atrial Septal Defect Neg Hx     Melanoma Neg Hx        Social History     Social History    Marital status:      Spouse name: Ameena    Number of children: 3    Years of education: N/A     Occupational History    retired      Social History Main Topics    Smoking status: Former Smoker     Packs/day: 1.00     Years: 20.00     Quit date: 9/11/1980    Smokeless tobacco: Never Used    Alcohol use No    Drug use: No    Sexual activity: No     Other Topics Concern    None     Social History Narrative    None       MEDICATIONS & ALLERGIES:     No current facility-administered medications on file prior to encounter.      Current Outpatient Prescriptions on File Prior to Encounter   Medication Sig Dispense Refill    allopurinol (ZYLOPRIM) 100 MG tablet TAKE 1 TABLET ONE TIME DAILY 90 tablet 4    atorvastatin (LIPITOR) 80 MG tablet TAKE 1 TABLET ONE TIME DAILY 90 tablet 3    fenofibrate micronized (LOFIBRA) 200 MG Cap Take 200 mg by mouth daily with breakfast.      ferrous  sulfate 325 (65 FE) MG EC tablet Take 1 tablet (325 mg total) by mouth once daily. 90 tablet 4    fosinopril (MONOPRIL) 40 MG tablet TAKE 1 TABLET ONE TIME DAILY 90 tablet 4    furosemide (LASIX) 20 MG tablet TAKE 1 TABLET ONE TIME DAILY 90 tablet 4    glimepiride (AMARYL) 1 MG tablet Take 1 tablet (1 mg total) by mouth every morning. 90 tablet 2    isosorbide mononitrate (IMDUR) 120 MG 24 hr tablet Take 1 tablet (120 mg total) by mouth once daily. 90 tablet 4    metoprolol tartrate (LOPRESSOR) 50 MG tablet Take 1 tablet (50 mg total) by mouth once daily. 90 tablet 3    nifedipine (PROCARDIA-XL) 30 MG (OSM) 24 hr tablet Take 1 tablet (30 mg total) by mouth once daily. 30 tablet 11    nitroGLYCERIN (NITROSTAT) 0.4 MG SL tablet Place 1 tablet (0.4 mg total) under the tongue every 5 (five) minutes as needed. As needed for chest pain 90 tablet 4    omega-3 acid ethyl esters (LOVAZA) 1 gram capsule Take 2 capsules (2 g total) by mouth 2 (two) times daily. 360 capsule 5    ranitidine (ZANTAC) 300 MG tablet Take 1 tablet (300 mg total) by mouth every evening. 90 tablet 3    warfarin (COUMADIN) 5 MG tablet Take 1 tablet (5 mg total) by mouth Daily. Current Dose (7.5 mg on Mon, Wed, Fri; 5 mg all other days) 120 tablet 3    clotrimazole-betamethasone 1-0.05% (LOTRISONE) cream Apply topically once daily. in between the 4th and 5th toes left foot. 45 g 1    warfarin (COUMADIN) 5 MG tablet TAKE 1 AND 1/2 TABLETS (7.5 MG) ON MONDAY, WEDNESDAY, FRIDAY AND 1 TABLET (5 MG) ALL OTHER DAYS (CURRENT DOSE) 111 tablet 3        Review of patient's allergies indicates:  No Known Allergies    OBJECTIVE:     Vital Signs:  Temp:  [97.6 °F (36.4 °C)] 97.6 °F (36.4 °C)  Pulse:  [52-66] 56  Resp:  [14-18] 18  SpO2:  [95 %-98 %] 98 %  BP: (109-152)/(50-73) 116/60  Body mass index is 30.95 kg/m².     Physical Exam:  General: well developed, well nourished, fatigued, no distress, pale  Cardiovascular: Heart: regular rate and rhythm,  S1, S2 normal, no murmur, click, rub or gallop and mechanical aortic valve was present. Chest Wall: no tenderness. Extremities: no cyanosis or edema, or clubbing, no edema, redness or tenderness in the calves or thighs and no ulcers, gangrene or trophic changes. Pulses: Left pedial pulse was regular; R pedial pulse was not palpable.  Abdomen/Rectal: Abdomen: soft, non-tender non-distented; bowel sounds normal; no masses,  no organomegaly. Rectal: not examined    Laboratory  Lab Results   Component Value Date    WBC 9.35 03/19/2018    HGB 13.6 (L) 03/19/2018    HCT 42.1 03/19/2018    MCV 94 03/19/2018     03/19/2018       Recent Labs  Lab 03/19/18  0530   GLU 73      K 5.0      CO2 14*   BUN 85*   CREATININE 6.6*   CALCIUM 10.2     Lab Results   Component Value Date    INR 4.4 (H) 03/19/2018    INR 2.3 02/20/2018    INR 1.8 (A) 01/31/2018     Lab Results   Component Value Date    HGBA1C 5.5 03/19/2018       ASSESSMENT & PLAN:     Current Problems List:  Active Hospital Problems    Diagnosis  POA    *Acute renal failure [N17.9]  Yes    Gastroesophageal reflux disease without esophagitis [K21.9]  Yes    Diarrhea [R19.7]  Yes    Obesity, diabetes, and hypertension syndrome [E11.9, I10, E66.9]  Yes    Hyperparathyroidism due to renal insufficiency [N25.81]  Yes    CKD (chronic kidney disease), stage III [N18.3]  Yes    Anemia of chronic renal failure, stage 3 (moderate) [N18.3, D63.1]  Yes    Left ventricular diastolic dysfunction with preserved systolic function [I51.9]  Yes    Type 2 diabetes mellitus with diabetic peripheral angiopathy without gangrene [E11.51]  Yes    H/O mechanical aortic valve replacement [Z95.2]  Not Applicable    History of gout [Z87.39]  Yes    Long term current use of anticoagulant therapy [Z79.01]  Not Applicable    Hyperlipidemia [E78.5]  Yes    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]  Yes    History of aortic valve repair [Z98.890]  Not Applicable    PVD  (peripheral vascular disease) [I73.9]  Yes    CAD (coronary atherosclerotic disease) [I25.10]  Yes      Resolved Hospital Problems    Diagnosis Date Resolved POA   No resolved problems to display.         Problem Assessment & Treatment Plan:    Patient is a 78 yo male with T2DM and heart disease has presented to the ED with profuse watery diarrhea and acute renal failure. Ddx includes HUS, viral gastroenteritis, C. Diff, bacterial gastroenteritis, and cholera.     Treat pt with ringers lactate solution and monitor hypovolemia and kidney function. Culture stool for bacterial infection: C. Diff, and cholera.         Signing Physician:  Jessica Connors

## 2018-03-19 NOTE — ASSESSMENT & PLAN NOTE
LONG TERM CURRENT USE OF ANTICOAGULATION THERAPY    Pt on anticoagulation at home with daily warfarin. INR was 4.4 on admission. Has decreased PO intake for the past two months   - Hold wafarin for now

## 2018-03-19 NOTE — SUBJECTIVE & OBJECTIVE
Past Medical History:   Diagnosis Date    Bilateral carotid artery disease 2/9/2017    CAD (coronary atherosclerotic disease) 9/11/2012    Cataract     Chronic kidney disease     DM (diabetes mellitus) 9/26/2012    History of gout 9/26/2012    Hyperlipidemia     Hypertension     Mechanical heart valve present        Past Surgical History:   Procedure Laterality Date    CARDIAC CATHETERIZATION      CARDIAC VALVE REPLACEMENT      CARDIAC VALVE SURGERY      COLON SURGERY      COLONOSCOPY N/A 3/31/2017    Procedure: COLONOSCOPY;  Surgeon: Bruno Raymond MD;  Location: [facility] ([address] [place] [address]);  Service: Endoscopy;  Laterality: N/A;  Patient's wife requesting date.    CORONARY ANGIOPLASTY      CORONARY ARTERY BYPASS GRAFT      HERNIA REPAIR      SPINE SURGERY      VASECTOMY         Review of patient's allergies indicates:  No Known Allergies    No current facility-administered medications on file prior to encounter.      Current Outpatient Prescriptions on File Prior to Encounter   Medication Sig    allopurinol (ZYLOPRIM) 100 MG tablet TAKE 1 TABLET ONE TIME DAILY    atorvastatin (LIPITOR) 80 MG tablet TAKE 1 TABLET ONE TIME DAILY    fenofibrate micronized (LOFIBRA) 200 MG Cap Take 200 mg by mouth daily with breakfast.    ferrous sulfate 325 (65 FE) MG EC tablet Take 1 tablet (325 mg total) by mouth once daily.    fosinopril (MONOPRIL) 40 MG tablet TAKE 1 TABLET ONE TIME DAILY    furosemide (LASIX) 20 MG tablet TAKE 1 TABLET ONE TIME DAILY    glimepiride (AMARYL) 1 MG tablet Take 1 tablet (1 mg total) by mouth every morning.    isosorbide mononitrate (IMDUR) 120 MG 24 hr tablet Take 1 tablet (120 mg total) by mouth once daily.    metoprolol tartrate (LOPRESSOR) 50 MG tablet Take 1 tablet (50 mg total) by mouth once daily.    nifedipine (PROCARDIA-XL) 30 MG (OSM) 24 hr tablet Take 1 tablet (30 mg total) by mouth once daily.    nitroGLYCERIN (NITROSTAT) 0.4 MG SL tablet Place 1 tablet (0.4 mg  total) under the tongue every 5 (five) minutes as needed. As needed for chest pain    omega-3 acid ethyl esters (LOVAZA) 1 gram capsule Take 2 capsules (2 g total) by mouth 2 (two) times daily.    ranitidine (ZANTAC) 300 MG tablet Take 1 tablet (300 mg total) by mouth every evening.    warfarin (COUMADIN) 5 MG tablet Take 1 tablet (5 mg total) by mouth Daily. Current Dose (7.5 mg on Mon, Wed, Fri; 5 mg all other days)    clotrimazole-betamethasone 1-0.05% (LOTRISONE) cream Apply topically once daily. in between the 4th and 5th toes left foot.    warfarin (COUMADIN) 5 MG tablet TAKE 1 AND 1/2 TABLETS (7.5 MG) ON MONDAY, WEDNESDAY, FRIDAY AND 1 TABLET (5 MG) ALL OTHER DAYS (CURRENT DOSE)     Family History     Problem Relation (Age of Onset)    Alcohol abuse Father    Diabetes Brother    Heart disease Mother, Father, Brother, Sister    Heart failure Mother, Father, Brother    No Known Problems Sister, Maternal Grandmother, Maternal Grandfather, Paternal Grandmother, Paternal Grandfather, Maternal Aunt, Maternal Uncle, Paternal Aunt, Paternal Uncle        Social History Main Topics    Smoking status: Former Smoker     Packs/day: 1.00     Years: 20.00     Quit date: 9/11/1980    Smokeless tobacco: Never Used    Alcohol use No    Drug use: No    Sexual activity: No     Review of Systems   Constitutional: Positive for appetite change (Ongoing for two months ). Negative for chills, fatigue and fever. Unexpected weight change: Wt was 206 two months ago. 186 on admission    HENT: Negative for facial swelling, sneezing and sore throat.    Eyes: Negative for pain and visual disturbance.   Respiratory: Positive for shortness of breath (Mild). Negative for cough and wheezing.    Cardiovascular: Negative for chest pain and leg swelling.   Gastrointestinal: Positive for diarrhea. Negative for abdominal distention, abdominal pain, blood in stool, nausea, rectal pain and vomiting.   Endocrine: Negative for polydipsia and  polyuria.   Genitourinary: Negative for decreased urine volume, difficulty urinating, dysuria and urgency.   Musculoskeletal: Negative for arthralgias and myalgias.   Skin: Negative for pallor, rash and wound.   Allergic/Immunologic: Negative for environmental allergies and food allergies.   Neurological: Negative for dizziness, light-headedness and headaches.   Psychiatric/Behavioral: Negative for confusion. The patient is not nervous/anxious.      Objective:     Vital Signs (Most Recent):  Temp: 97.6 °F (36.4 °C) (03/19/18 0826)  Pulse: (!) 56 (03/19/18 0826)  Resp: 18 (03/19/18 0826)  BP: 116/60 (03/19/18 0826)  SpO2: 98 % (03/19/18 0732) Vital Signs (24h Range):  Temp:  [97.6 °F (36.4 °C)] 97.6 °F (36.4 °C)  Pulse:  [52-66] 56  Resp:  [14-18] 18  SpO2:  [95 %-98 %] 98 %  BP: (109-152)/(50-73) 116/60     Weight: 84.4 kg (186 lb)  Body mass index is 30.95 kg/m².    Physical Exam   Constitutional: He is oriented to person, place, and time. He appears well-developed and well-nourished.   HENT:   Head: Normocephalic and atraumatic.   Eyes: Conjunctivae and EOM are normal. Pupils are equal, round, and reactive to light.   Neck: Normal range of motion. Neck supple.   Cardiovascular: Normal rate, regular rhythm, normal heart sounds and intact distal pulses.    No murmur heard.  Pulmonary/Chest: Effort normal and breath sounds normal. No respiratory distress. He has no wheezes.   Abdominal: Soft. Bowel sounds are normal. He exhibits no distension and no mass. There is no tenderness. There is no guarding, no CVA tenderness, no tenderness at McBurney's point and negative Dove's sign.   Lymphadenopathy:     He has no cervical adenopathy.   Neurological: He is alert and oriented to person, place, and time. GCS eye subscore is 4. GCS verbal subscore is 5. GCS motor subscore is 6.   Skin: Skin is warm and dry.   Vitals reviewed.        CRANIAL NERVES     CN III, IV, VI   Pupils are equal, round, and reactive to  light.  Extraocular motions are normal.        Significant Labs: All pertinent labs within the past 24 hours have been reviewed.    Significant Imaging: I have reviewed all pertinent imaging results/findings within the past 24 hours.

## 2018-03-19 NOTE — H&P
[facility] Hospital Medicine  History & Physical    Patient Name: Dariel Urbina  MRN: [id number]  Admission Date: 3/19/2018  Attending Physician: Abdi Watson MD   Primary Care Provider: Devon Langston MD    [facility] Medicine Team: [facility] HOSP MED 3 Behram Khan, MD     Patient information was obtained from patient, spouse/SO, past medical records and ER records.     Subjective:     Principal Problem:Acute renal failure    Chief Complaint:   Chief Complaint   Patient presents with    Diarrhea     Several episodes of diarrhea since Friday, reports weakness and fatigue, no other symptoms reported.         HPI: Mr Urbina is a 78 yo male with PMH significant for HTN, CKD III, HLD, CAD, DMT2, gout, mechanical aortic valve, diastolic heart failure, and history of left-sided colectomy for diverticulosis who presents with diarrhea of three day duration with sudden onset. Diarrhea is watery nigh on clear without any mucus or blood. Pt c/o greater than 6 episodes of diarrhea per day for the past three days. Associated symptoms include weakness, fatigue and decreased urine output. He has not urinated for the past two days. He denies any abdominal pain or cramps currently. Pt has tried immodium, kaopectate and pepto bismal without any symptomatic benefit. He has never had an episode of diarrhea like this before. Pt cannot attribute this episode of diarrhea to any particular cause.     He denies any recent changes in diet or recent travel. Denies sick contacts, fevers, chills, headache, or confusion. Denies recent antibiotics or hospitalizations. Denies changes to medications. Review of systems was positive for shortness of breath, cough while eating, and decreased urine output. Pt also endorses a decreased appetite over the past two months resulting in an unintentional twenty pound weight loss.     Past Medical History:   Diagnosis Date    Bilateral carotid artery disease 2/9/2017    CAD (coronary  atherosclerotic disease) 9/11/2012    Cataract     Chronic kidney disease     DM (diabetes mellitus) 9/26/2012    History of gout 9/26/2012    Hyperlipidemia     Hypertension     Mechanical heart valve present        Past Surgical History:   Procedure Laterality Date    CARDIAC CATHETERIZATION      CARDIAC VALVE REPLACEMENT      CARDIAC VALVE SURGERY      COLON SURGERY      COLONOSCOPY N/A 3/31/2017    Procedure: COLONOSCOPY;  Surgeon: Bruno Raymond MD;  Location: [facility] ([address] [place] [address]);  Service: Endoscopy;  Laterality: N/A;  Patient's wife requesting date.    CORONARY ANGIOPLASTY      CORONARY ARTERY BYPASS GRAFT      HERNIA REPAIR      SPINE SURGERY      VASECTOMY         Review of patient's allergies indicates:  No Known Allergies    No current facility-administered medications on file prior to encounter.      Current Outpatient Prescriptions on File Prior to Encounter   Medication Sig    allopurinol (ZYLOPRIM) 100 MG tablet TAKE 1 TABLET ONE TIME DAILY    atorvastatin (LIPITOR) 80 MG tablet TAKE 1 TABLET ONE TIME DAILY    fenofibrate micronized (LOFIBRA) 200 MG Cap Take 200 mg by mouth daily with breakfast.    ferrous sulfate 325 (65 FE) MG EC tablet Take 1 tablet (325 mg total) by mouth once daily.    fosinopril (MONOPRIL) 40 MG tablet TAKE 1 TABLET ONE TIME DAILY    furosemide (LASIX) 20 MG tablet TAKE 1 TABLET ONE TIME DAILY    glimepiride (AMARYL) 1 MG tablet Take 1 tablet (1 mg total) by mouth every morning.    isosorbide mononitrate (IMDUR) 120 MG 24 hr tablet Take 1 tablet (120 mg total) by mouth once daily.    metoprolol tartrate (LOPRESSOR) 50 MG tablet Take 1 tablet (50 mg total) by mouth once daily.    nifedipine (PROCARDIA-XL) 30 MG (OSM) 24 hr tablet Take 1 tablet (30 mg total) by mouth once daily.    nitroGLYCERIN (NITROSTAT) 0.4 MG SL tablet Place 1 tablet (0.4 mg total) under the tongue every 5 (five) minutes as needed. As needed for chest pain    omega-3 acid ethyl  esters (LOVAZA) 1 gram capsule Take 2 capsules (2 g total) by mouth 2 (two) times daily.    ranitidine (ZANTAC) 300 MG tablet Take 1 tablet (300 mg total) by mouth every evening.    warfarin (COUMADIN) 5 MG tablet Take 1 tablet (5 mg total) by mouth Daily. Current Dose (7.5 mg on Mon, Wed, Fri; 5 mg all other days)    clotrimazole-betamethasone 1-0.05% (LOTRISONE) cream Apply topically once daily. in between the 4th and 5th toes left foot.    warfarin (COUMADIN) 5 MG tablet TAKE 1 AND 1/2 TABLETS (7.5 MG) ON MONDAY, WEDNESDAY, FRIDAY AND 1 TABLET (5 MG) ALL OTHER DAYS (CURRENT DOSE)     Family History     Problem Relation (Age of Onset)    Alcohol abuse Father    Diabetes Brother    Heart disease Mother, Father, Brother, Sister    Heart failure Mother, Father, Brother    No Known Problems Sister, Maternal Grandmother, Maternal Grandfather, Paternal Grandmother, Paternal Grandfather, Maternal Aunt, Maternal Uncle, Paternal Aunt, Paternal Uncle        Social History Main Topics    Smoking status: Former Smoker     Packs/day: 1.00     Years: 20.00     Quit date: 9/11/1980    Smokeless tobacco: Never Used    Alcohol use No    Drug use: No    Sexual activity: No     Review of Systems   Constitutional: Positive for appetite change (Ongoing for two months ). Negative for chills, fatigue and fever. Unexpected weight change: Wt was 206 two months ago. 186 on admission    HENT: Negative for facial swelling, sneezing and sore throat.    Eyes: Negative for pain and visual disturbance.   Respiratory: Positive for shortness of breath (Mild). Negative for cough and wheezing.    Cardiovascular: Negative for chest pain and leg swelling.   Gastrointestinal: Positive for diarrhea. Negative for abdominal distention, abdominal pain, blood in stool, nausea, rectal pain and vomiting.   Endocrine: Negative for polydipsia and polyuria.   Genitourinary: Negative for decreased urine volume, difficulty urinating, dysuria and urgency.    Musculoskeletal: Negative for arthralgias and myalgias.   Skin: Negative for pallor, rash and wound.   Allergic/Immunologic: Negative for environmental allergies and food allergies.   Neurological: Negative for dizziness, light-headedness and headaches.   Psychiatric/Behavioral: Negative for confusion. The patient is not nervous/anxious.      Objective:     Vital Signs (Most Recent):  Temp: 97.6 °F (36.4 °C) (03/19/18 0826)  Pulse: (!) 56 (03/19/18 0826)  Resp: 18 (03/19/18 0826)  BP: 116/60 (03/19/18 0826)  SpO2: 98 % (03/19/18 0732) Vital Signs (24h Range):  Temp:  [97.6 °F (36.4 °C)] 97.6 °F (36.4 °C)  Pulse:  [52-66] 56  Resp:  [14-18] 18  SpO2:  [95 %-98 %] 98 %  BP: (109-152)/(50-73) 116/60     Weight: 84.4 kg (186 lb)  Body mass index is 30.95 kg/m².    Physical Exam   Constitutional: He is oriented to person, place, and time. He appears well-developed and well-nourished.   HENT:   Head: Normocephalic and atraumatic.   Eyes: Conjunctivae and EOM are normal. Pupils are equal, round, and reactive to light.   Neck: Normal range of motion. Neck supple.   Cardiovascular: Normal rate, regular rhythm, normal heart sounds and intact distal pulses.    No murmur heard.  Pulmonary/Chest: Effort normal and breath sounds normal. No respiratory distress. He has no wheezes.   Abdominal: Soft. Bowel sounds are normal. He exhibits no distension and no mass. There is no tenderness. There is no guarding, no CVA tenderness, no tenderness at McBurney's point and negative Dove's sign.   Lymphadenopathy:     He has no cervical adenopathy.   Neurological: He is alert and oriented to person, place, and time. GCS eye subscore is 4. GCS verbal subscore is 5. GCS motor subscore is 6.   Skin: Skin is warm and dry.   Vitals reviewed.        CRANIAL NERVES     CN III, IV, VI   Pupils are equal, round, and reactive to light.  Extraocular motions are normal.        Significant Labs: All pertinent labs within the past 24 hours have been  reviewed.    Significant Imaging: I have reviewed all pertinent imaging results/findings within the past 24 hours.    Assessment/Plan:     * Acute renal failure    Baseline BUN:Cr is 35:1.3; was 85 and 6.6 on admission. Likely prerenal by history. Denies suprapubic pain and nil pain on palpation. Secondary to dehydration resulting from his severe diarrhea. Will treat with fluid replacement. Bladder scan was negative in the emergency room.   - 2L LR bolus  - Low UOP, will monitor.              Severe diarrhea    Three day history of profuse watery diarrhea with resultant dehydration. Pt's bicarbonate was 14 in the emergency room. Greater than 6 bowel movements per day.   - Lactated ringer's to avoid hyperchloremic metabolic acidosis. Nil peritonitic signs, nil rebound tenderness or guarding.   - C. Diff EIA  - Stool cultures  - KUB flat and erect  - Nil empiric antibiotic  - Nil antimotility agent until infectious etiology is ruled out             CAD (coronary atherosclerotic disease)    PERIPHERAL VASCULAR DISEASE    Takes high-intensity statin. Also has nitroglycerin 0.4 mg PRN. Denies every requiring nitro. Nil aspirin in the outpatient setting.     - Atorvastatin 80 mg QD  - Isosorbide mononitrate 120 mg qd              H/O mechanical aortic valve replacement    LONG TERM CURRENT USE OF ANTICOAGULATION THERAPY    Pt on anticoagulation at home with daily warfarin. INR was 4.4 on admission. Has decreased PO intake for the past two months   - Hold wafarin for now          Renovascular hypertension    Home regimen includes fosinopril 40 mg qd, nifedipine 30 mg qd.  - Will hold fosinopril in setting of JIM  - Continue nifedipine.             History of gout    Allopurinol 100 mg qd          Left ventricular diastolic dysfunction with preserved systolic function    ACE-inhibitor and furosemide at home. Will hold in setting of prerenal JIM.             Type 2 diabetes mellitus with diabetic peripheral angiopathy  without gangrene    A1c was 5.5. On glimiperide at home.   - SSI           Hyperlipidemia    On fenofibrate 200 mg at home and atorvastatin 80 mg qd  - Will continue statin          CKD (chronic kidney disease), stage III    HYPERPARATHYROIDISM DUE TO RENAL INSUFFICIENCY  ANEMIA OF CHRONIC RENAL FAILURE, STAGE 3    GFR is usually in the 50's in the setting of high blood pressure. Corrected calcium was 10.0 mg/dL.  - Will continue to monitor and treat his acute renal failure.   - Continue ferrous sulfate, 325 mg qd (per outpatient regimen)          Gastroesophageal reflux disease without esophagitis    On H2-inhibitor, famotidine at home.   - Will hold in setting of JIM          Obesity, diabetes, and hypertension syndrome    Pt reports 20 lbs of unintentional weight loss d/t decreased appetite   - Cardiac diet          Hyperparathyroidism due to renal insufficiency              Anemia of chronic renal failure, stage 3 (moderate)              History of aortic valve repair              Long term current use of anticoagulant therapy              PVD (peripheral vascular disease)                VTE Risk Mitigation     None         DISPO: Discharge after resolution of JIM.      Behram Khan, MD  Department of Hospital Medicine   [facility]

## 2018-03-19 NOTE — ED PROVIDER NOTES
Encounter Date: 3/19/2018    SCRIBE #1 NOTE: I, Gita Lozano, am scribing for, and in the presence of,  Dr. Archer. I have scribed the entire note.       History     Chief Complaint   Patient presents with    Diarrhea     Several episodes of diarrhea since Friday, reports weakness and fatigue, no other symptoms reported.      Time patient was seen by the provider: 5:28 AM      The patient is a 77 y.o. male with co-morbidities including: HTN, CKD, HLD, CAD, DM , mechanical heart valve who presents to the ED with a complaint of several episodes of diarrhea for 3 days. Pt also endorses some weakness and fatigue that started last night.  Pt denies blood in stool, nausea, abd pain,  fever, chills, sick contacts. He is on Warfarin for heart valve replacement. Has hx of colon surgery.           The history is provided by the patient, medical records and the spouse.     Review of patient's allergies indicates:  No Known Allergies  Past Medical History:   Diagnosis Date    Bilateral carotid artery disease 2/9/2017    Bleeding     CAD (coronary atherosclerotic disease) 9/11/2012    Cataract     Chronic kidney disease     DM (diabetes mellitus) 9/26/2012    History of gout 9/26/2012    Hyperlipidemia     Hypertension     Mechanical heart valve present     Renal insufficiency 9/11/2012    Type II or unspecified type diabetes mellitus without mention of complication, not stated as uncontrolled      Past Surgical History:   Procedure Laterality Date    CARDIAC CATHETERIZATION      CARDIAC VALVE REPLACEMENT      CARDIAC VALVE SURGERY      COLON SURGERY      COLONOSCOPY N/A 3/31/2017    Procedure: COLONOSCOPY;  Surgeon: Bruno Raymond MD;  Location: [address];  Service: Endoscopy;  Laterality: N/A;  Patient's wife requesting date.    CORONARY ANGIOPLASTY      CORONARY ARTERY BYPASS GRAFT      HERNIA REPAIR      SPINE SURGERY      VASECTOMY       Family History   Problem Relation Age of Onset     Heart failure Mother     Heart disease Mother     Heart failure Father     Heart disease Father     Alcohol abuse Father     Heart failure Brother     Heart disease Brother     Diabetes Brother     No Known Problems Sister     No Known Problems Maternal Grandmother     No Known Problems Maternal Grandfather     No Known Problems Paternal Grandmother     No Known Problems Paternal Grandfather     Heart disease Sister     No Known Problems Maternal Aunt     No Known Problems Maternal Uncle     No Known Problems Paternal Aunt     No Known Problems Paternal Uncle     Amblyopia Neg Hx     Blindness Neg Hx     Cancer Neg Hx     Cataracts Neg Hx     Glaucoma Neg Hx     Hypertension Neg Hx     Macular degeneration Neg Hx     Retinal detachment Neg Hx     Strabismus Neg Hx     Stroke Neg Hx     Thyroid disease Neg Hx     Anemia Neg Hx     Arrhythmia Neg Hx     Asthma Neg Hx     Clotting disorder Neg Hx     Fainting Neg Hx     Heart attack Neg Hx     Hyperlipidemia Neg Hx     Atrial Septal Defect Neg Hx     Melanoma Neg Hx      Social History   Substance Use Topics    Smoking status: Former Smoker     Packs/day: 1.00     Years: 20.00     Quit date: 9/11/1980    Smokeless tobacco: Never Used    Alcohol use No     Review of Systems   Constitutional: Positive for fatigue. Negative for chills and fever.        (+) weakness    HENT: Negative for sore throat.    Respiratory: Negative for shortness of breath.    Cardiovascular: Negative for chest pain.   Gastrointestinal: Positive for diarrhea. Negative for abdominal pain, blood in stool and nausea.   Genitourinary: Negative for dysuria.   Musculoskeletal: Negative for back pain.   Skin: Negative for rash.   Neurological: Negative for weakness.   Hematological: Does not bruise/bleed easily.       Physical Exam     Initial Vitals [03/19/18 0510]   BP Pulse Resp Temp SpO2   (!) 116/50 (!) 52 14 97.6 °F (36.4 °C) 98 %      MAP       72          Physical Exam    Nursing note and vitals reviewed.  Constitutional: He appears well-developed and well-nourished. He is not diaphoretic. No distress.   HENT:   Head: Normocephalic and atraumatic.   Dry mucous membranes    Neck: Normal range of motion. Neck supple. No JVD present.   Cardiovascular: Normal rate, regular rhythm, normal heart sounds and intact distal pulses.   Pulmonary/Chest: Breath sounds normal. No respiratory distress. He has no wheezes. He has no rhonchi. He has no rales.   Abdominal: Soft. He exhibits no distension. There is no tenderness. There is no rebound and no guarding.   Musculoskeletal: Normal range of motion. He exhibits no edema.   Lymphadenopathy:     He has no cervical adenopathy.   Neurological: He is alert and oriented to person, place, and time. No cranial nerve deficit or sensory deficit.   Skin: Skin is warm and dry.         ED Course   Procedures  Labs Reviewed   CBC W/ AUTO DIFFERENTIAL - Abnormal; Notable for the following:        Result Value    RBC 4.48 (*)     Hemoglobin 13.6 (*)     All other components within normal limits   COMPREHENSIVE METABOLIC PANEL - Abnormal; Notable for the following:     CO2 14 (*)     BUN, Bld 85 (*)     Creatinine 6.6 (*)     Total Protein 8.7 (*)     eGFR if  8.6 (*)     eGFR if non  7.4 (*)     All other components within normal limits   CLOSTRIDIUM DIFFICILE   LACTIC ACID, PLASMA   URINALYSIS, REFLEX TO URINE CULTURE   WBC, STOOL             Medical Decision Making:   History:   Old Medical Records: I decided to obtain old medical records.  Old Records Summarized: other records.       <> Summary of Records: echocardiogram on 5/3/17 shows normal EF with moderate to serve mitral regurg.  Initial Assessment:   Emergent evaluation of 76 yo male with diarrhea and weakness.  Differential Diagnosis:   Ddx include gastroenteritis, electrolyte derangement, acute kidney injury, orthostatic hypotension.   Clinical  Tests:   Lab Tests: Ordered and Reviewed  ED Management:  Labs ordered.   Patient treated with 1 L of LR.    Labs reviewed significant for acute kidney failure.  Creatinine is elevated to 6.6 from 1.3.  Discussed with hospital medicine, patient will be admitted to -3 Dr. Watson.    Patient family aware plan.              Scribe Attestation:   Scribe #1: I performed the above scribed service and the documentation accurately describes the services I performed. I attest to the accuracy of the note.    Attending Attestation:           Physician Attestation for Scribe:      Comments: I, Dr. Brandy Archer, personally performed the services described in this documentation. All medical record entries made by the scribe were at my direction and in my presence.  I have reviewed the chart and agree that the record reflects my personal performance and is accurate and complete. Brandy Archer MD.                 Clinical Impression:   The primary encounter diagnosis was Acute renal failure, unspecified acute renal failure type. A diagnosis of Diarrhea, unspecified type was also pertinent to this visit.    Disposition:   Disposition: Discharged  Condition: Stable                        Brandy rAcher MD  03/19/18 0631

## 2018-03-19 NOTE — ED NOTES
Pt AAOx3, resting in bed. No distress noted. Respirations even and unlabored. Denies any current complaint. Updated on plan of care- waiting on room, verbalized understanding. Side rails up x2. Call light within reach. Wife at bedside. Will continue to monitor.

## 2018-03-19 NOTE — ED TRIAGE NOTES
Pt presents to the ED c/o watery diarrhea since Friday. Reports having about 7 episodes of diarrhea in the last 2.5 hours alone. Denies abdominal pain, n/v, chest pain, and SOB. Denies being around anyone sick recently. Endorses feeling weak and fatigued.     Pt identifiers Dariel Urbina checked and correct  LOC: The patient is awake, alert, aware of environment with an appropriate affect. Oriented x3, speaking appropriately.   APPEARANCE: Pt resting comfortably, in no acute distress, pt is clean and well groomed, clothing properly fastened  SKIN: Skin warm, dry and intact, normal skin turgor, moist mucus membranes  RESPIRATORY: Airway is open and patent, respirations are spontaneous, even and unlabored, normal effort and rate  CARDIAC: Regular rate, no peripheral edema noted, capillary refill < 3 seconds, bilateral radial pulses 2+  ABDOMEN: Soft, nontender, nondistended. Bowel sounds present.

## 2018-03-19 NOTE — ASSESSMENT & PLAN NOTE
PERIPHERAL VASCULAR DISEASE    Takes high-intensity statin. Also has nitroglycerin 0.4 mg PRN. Denies every requiring nitro. Nil aspirin in the outpatient setting.     - Atorvastatin 80 mg QD  - Isosorbide mononitrate 120 mg qd

## 2018-03-20 PROBLEM — N18.30 ACUTE RENAL FAILURE SUPERIMPOSED ON STAGE 3 CHRONIC KIDNEY DISEASE: Status: ACTIVE | Noted: 2018-03-19

## 2018-03-20 PROBLEM — E87.20 METABOLIC ACIDOSIS WITH NORMAL ANION GAP AND BICARBONATE LOSSES: Status: ACTIVE | Noted: 2018-03-20

## 2018-03-20 PROBLEM — E87.5 ACUTE HYPERKALEMIA: Status: ACTIVE | Noted: 2018-03-20

## 2018-03-20 PROBLEM — E87.5 ACUTE HYPERKALEMIA: Chronic | Status: ACTIVE | Noted: 2018-03-20

## 2018-03-20 LAB
25(OH)D3+25(OH)D2 SERPL-MCNC: 31 NG/ML
ALBUMIN SERPL BCP-MCNC: 3.8 G/DL
ALP SERPL-CCNC: 73 U/L
ALT SERPL W/O P-5'-P-CCNC: 13 U/L
ANION GAP SERPL CALC-SCNC: 12 MMOL/L
ANION GAP SERPL CALC-SCNC: 13 MMOL/L
ANION GAP SERPL CALC-SCNC: 14 MMOL/L
ANION GAP SERPL CALC-SCNC: 9 MMOL/L
AST SERPL-CCNC: 18 U/L
BASOPHILS # BLD AUTO: 0.02 K/UL
BASOPHILS NFR BLD: 0.3 %
BILIRUB SERPL-MCNC: 0.7 MG/DL
BNP SERPL-MCNC: 197 PG/ML
BUN SERPL-MCNC: 100 MG/DL
BUN SERPL-MCNC: 96 MG/DL
BUN SERPL-MCNC: 97 MG/DL
BUN SERPL-MCNC: 99 MG/DL
CA-I BLDV-SCNC: 1.29 MMOL/L
CALCIUM SERPL-MCNC: 8.9 MG/DL
CALCIUM SERPL-MCNC: 9.2 MG/DL
CALCIUM SERPL-MCNC: 9.2 MG/DL
CALCIUM SERPL-MCNC: 9.4 MG/DL
CHLORIDE SERPL-SCNC: 108 MMOL/L
CHLORIDE SERPL-SCNC: 108 MMOL/L
CHLORIDE SERPL-SCNC: 109 MMOL/L
CHLORIDE SERPL-SCNC: 110 MMOL/L
CHLORIDE UR-SCNC: <20 MMOL/L
CO2 SERPL-SCNC: 11 MMOL/L
CO2 SERPL-SCNC: 13 MMOL/L
CO2 SERPL-SCNC: 15 MMOL/L
CO2 SERPL-SCNC: 17 MMOL/L
CREAT SERPL-MCNC: 5 MG/DL
CREAT SERPL-MCNC: 5.4 MG/DL
CREAT SERPL-MCNC: 5.5 MG/DL
CREAT SERPL-MCNC: 6 MG/DL
CREAT UR-MCNC: 335 MG/DL
DIFFERENTIAL METHOD: ABNORMAL
E COLI SXT1 STL QL IA: NEGATIVE
E COLI SXT2 STL QL IA: NEGATIVE
EOSINOPHIL # BLD AUTO: 0.2 K/UL
EOSINOPHIL NFR BLD: 2.7 %
ERYTHROCYTE [DISTWIDTH] IN BLOOD BY AUTOMATED COUNT: 14.1 %
EST. GFR  (AFRICAN AMERICAN): 10.7 ML/MIN/1.73 M^2
EST. GFR  (AFRICAN AMERICAN): 11 ML/MIN/1.73 M^2
EST. GFR  (AFRICAN AMERICAN): 12 ML/MIN/1.73 M^2
EST. GFR  (AFRICAN AMERICAN): 9.6 ML/MIN/1.73 M^2
EST. GFR  (NON AFRICAN AMERICAN): 10.4 ML/MIN/1.73 M^2
EST. GFR  (NON AFRICAN AMERICAN): 8.3 ML/MIN/1.73 M^2
EST. GFR  (NON AFRICAN AMERICAN): 9.3 ML/MIN/1.73 M^2
EST. GFR  (NON AFRICAN AMERICAN): 9.5 ML/MIN/1.73 M^2
GLUCOSE SERPL-MCNC: 139 MG/DL
GLUCOSE SERPL-MCNC: 158 MG/DL
GLUCOSE SERPL-MCNC: 195 MG/DL
GLUCOSE SERPL-MCNC: 72 MG/DL
HCT VFR BLD AUTO: 35.4 %
HGB BLD-MCNC: 11.7 G/DL
IMM GRANULOCYTES # BLD AUTO: 0.02 K/UL
IMM GRANULOCYTES NFR BLD AUTO: 0.3 %
INR PPP: 4.1
LYMPHOCYTES # BLD AUTO: 2.3 K/UL
LYMPHOCYTES NFR BLD: 29 %
MAGNESIUM SERPL-MCNC: 1.4 MG/DL
MCH RBC QN AUTO: 30 PG
MCHC RBC AUTO-ENTMCNC: 33.1 G/DL
MCV RBC AUTO: 91 FL
MONOCYTES # BLD AUTO: 0.8 K/UL
MONOCYTES NFR BLD: 10.3 %
NEUTROPHILS # BLD AUTO: 4.5 K/UL
NEUTROPHILS NFR BLD: 57.4 %
NRBC BLD-RTO: 0 /100 WBC
OSMOLALITY UR: 412 MOSM/KG
PHOSPHATE SERPL-MCNC: 4.2 MG/DL
PLATELET # BLD AUTO: 146 K/UL
PMV BLD AUTO: 10.9 FL
POCT GLUCOSE: 199 MG/DL (ref 70–110)
POTASSIUM SERPL-SCNC: 4.6 MMOL/L
POTASSIUM SERPL-SCNC: 4.6 MMOL/L
POTASSIUM SERPL-SCNC: 5.1 MMOL/L
POTASSIUM SERPL-SCNC: 5.3 MMOL/L
POTASSIUM SERPL-SCNC: 6.1 MMOL/L
POTASSIUM SERPL-SCNC: 6.8 MMOL/L
POTASSIUM UR-SCNC: 45 MMOL/L
POTASSIUM UR-SCNC: 45 MMOL/L
PROT SERPL-MCNC: 7.5 G/DL
PROTHROMBIN TIME: 40.3 SEC
PTH-INTACT SERPL-MCNC: 200 PG/ML
RBC # BLD AUTO: 3.9 M/UL
SODIUM SERPL-SCNC: 133 MMOL/L
SODIUM SERPL-SCNC: 134 MMOL/L
SODIUM SERPL-SCNC: 135 MMOL/L
SODIUM SERPL-SCNC: 137 MMOL/L
SODIUM UR-SCNC: 32 MMOL/L
SODIUM UR-SCNC: 32 MMOL/L
TROPONIN I SERPL DL<=0.01 NG/ML-MCNC: 0.04 NG/ML
TROPONIN I SERPL DL<=0.01 NG/ML-MCNC: 11.24 NG/ML
TROPONIN I SERPL DL<=0.01 NG/ML-MCNC: 2.9 NG/ML
WBC # BLD AUTO: 7.79 K/UL

## 2018-03-20 PROCEDURE — 84133 ASSAY OF URINE POTASSIUM: CPT

## 2018-03-20 PROCEDURE — 82570 ASSAY OF URINE CREATININE: CPT

## 2018-03-20 PROCEDURE — 25000003 PHARM REV CODE 250: Performed by: STUDENT IN AN ORGANIZED HEALTH CARE EDUCATION/TRAINING PROGRAM

## 2018-03-20 PROCEDURE — 82436 ASSAY OF URINE CHLORIDE: CPT

## 2018-03-20 PROCEDURE — 83970 ASSAY OF PARATHORMONE: CPT

## 2018-03-20 PROCEDURE — 80053 COMPREHEN METABOLIC PANEL: CPT

## 2018-03-20 PROCEDURE — 83735 ASSAY OF MAGNESIUM: CPT

## 2018-03-20 PROCEDURE — 84100 ASSAY OF PHOSPHORUS: CPT

## 2018-03-20 PROCEDURE — 85025 COMPLETE CBC W/AUTO DIFF WBC: CPT

## 2018-03-20 PROCEDURE — 93005 ELECTROCARDIOGRAM TRACING: CPT

## 2018-03-20 PROCEDURE — 94640 AIRWAY INHALATION TREATMENT: CPT

## 2018-03-20 PROCEDURE — 85610 PROTHROMBIN TIME: CPT

## 2018-03-20 PROCEDURE — 84165 PROTEIN E-PHORESIS SERUM: CPT

## 2018-03-20 PROCEDURE — 36415 COLL VENOUS BLD VENIPUNCTURE: CPT

## 2018-03-20 PROCEDURE — 63600175 PHARM REV CODE 636 W HCPCS: Performed by: STUDENT IN AN ORGANIZED HEALTH CARE EDUCATION/TRAINING PROGRAM

## 2018-03-20 PROCEDURE — 99233 SBSQ HOSP IP/OBS HIGH 50: CPT | Mod: GC,,, | Performed by: INTERNAL MEDICINE

## 2018-03-20 PROCEDURE — 20600001 HC STEP DOWN PRIVATE ROOM

## 2018-03-20 PROCEDURE — 83880 ASSAY OF NATRIURETIC PEPTIDE: CPT

## 2018-03-20 PROCEDURE — 82330 ASSAY OF CALCIUM: CPT

## 2018-03-20 PROCEDURE — 86334 IMMUNOFIX E-PHORESIS SERUM: CPT

## 2018-03-20 PROCEDURE — 84165 PROTEIN E-PHORESIS SERUM: CPT | Mod: 26,,, | Performed by: PATHOLOGY

## 2018-03-20 PROCEDURE — 82306 VITAMIN D 25 HYDROXY: CPT

## 2018-03-20 PROCEDURE — 84132 ASSAY OF SERUM POTASSIUM: CPT

## 2018-03-20 PROCEDURE — 93010 ELECTROCARDIOGRAM REPORT: CPT | Mod: ,,, | Performed by: INTERNAL MEDICINE

## 2018-03-20 PROCEDURE — 25000242 PHARM REV CODE 250 ALT 637 W/ HCPCS: Performed by: STUDENT IN AN ORGANIZED HEALTH CARE EDUCATION/TRAINING PROGRAM

## 2018-03-20 PROCEDURE — 84484 ASSAY OF TROPONIN QUANT: CPT

## 2018-03-20 PROCEDURE — 99233 SBSQ HOSP IP/OBS HIGH 50: CPT | Mod: ,,, | Performed by: INTERNAL MEDICINE

## 2018-03-20 PROCEDURE — 80048 BASIC METABOLIC PNL TOTAL CA: CPT | Mod: 91

## 2018-03-20 PROCEDURE — 86334 IMMUNOFIX E-PHORESIS SERUM: CPT | Mod: 26,,, | Performed by: PATHOLOGY

## 2018-03-20 PROCEDURE — 83935 ASSAY OF URINE OSMOLALITY: CPT

## 2018-03-20 PROCEDURE — 84484 ASSAY OF TROPONIN QUANT: CPT | Mod: 91

## 2018-03-20 PROCEDURE — 84300 ASSAY OF URINE SODIUM: CPT

## 2018-03-20 RX ORDER — DEXTROSE 50 % IN WATER (D50W) INTRAVENOUS SYRINGE
25 ONCE
Status: DISCONTINUED | OUTPATIENT
Start: 2018-03-20 | End: 2018-03-20

## 2018-03-20 RX ORDER — DEXTROSE 50 % IN WATER (D50W) INTRAVENOUS SYRINGE
25
Status: ACTIVE | OUTPATIENT
Start: 2018-03-20 | End: 2018-03-21

## 2018-03-20 RX ORDER — ALBUTEROL SULFATE 0.83 MG/ML
10 SOLUTION RESPIRATORY (INHALATION) ONCE
Status: COMPLETED | OUTPATIENT
Start: 2018-03-20 | End: 2018-03-20

## 2018-03-20 RX ORDER — NITROGLYCERIN 0.4 MG/1
0.4 TABLET SUBLINGUAL EVERY 5 MIN PRN
Status: DISCONTINUED | OUTPATIENT
Start: 2018-03-20 | End: 2018-03-28 | Stop reason: HOSPADM

## 2018-03-20 RX ORDER — METRONIDAZOLE 500 MG/1
500 TABLET ORAL EVERY 12 HOURS
Status: DISCONTINUED | OUTPATIENT
Start: 2018-03-20 | End: 2018-03-23

## 2018-03-20 RX ORDER — INDOMETHACIN 25 MG/1
100 CAPSULE ORAL ONCE
Status: DISCONTINUED | OUTPATIENT
Start: 2018-03-20 | End: 2018-03-20

## 2018-03-20 RX ORDER — SODIUM BICARBONATE 650 MG/1
1 TABLET ORAL 3 TIMES DAILY
Status: DISCONTINUED | OUTPATIENT
Start: 2018-03-20 | End: 2018-03-20

## 2018-03-20 RX ORDER — CLOPIDOGREL BISULFATE 75 MG/1
300 TABLET ORAL ONCE
Status: COMPLETED | OUTPATIENT
Start: 2018-03-20 | End: 2018-03-20

## 2018-03-20 RX ORDER — DEXTROSE 50 % IN WATER (D50W) INTRAVENOUS SYRINGE
25 ONCE
Status: COMPLETED | OUTPATIENT
Start: 2018-03-20 | End: 2018-03-20

## 2018-03-20 RX ORDER — CLOPIDOGREL BISULFATE 75 MG/1
75 TABLET ORAL DAILY
Status: DISCONTINUED | OUTPATIENT
Start: 2018-03-21 | End: 2018-03-27

## 2018-03-20 RX ORDER — HEPARIN SODIUM 5000 [USP'U]/ML
5000 INJECTION, SOLUTION INTRAVENOUS; SUBCUTANEOUS EVERY 8 HOURS
Status: DISCONTINUED | OUTPATIENT
Start: 2018-03-20 | End: 2018-03-21

## 2018-03-20 RX ORDER — ALBUTEROL SULFATE 0.83 MG/ML
10 SOLUTION RESPIRATORY (INHALATION) ONCE
Status: DISCONTINUED | OUTPATIENT
Start: 2018-03-20 | End: 2018-03-20

## 2018-03-20 RX ORDER — INDOMETHACIN 25 MG/1
100 CAPSULE ORAL ONCE
Status: COMPLETED | OUTPATIENT
Start: 2018-03-20 | End: 2018-03-20

## 2018-03-20 RX ORDER — DEXTROSE 50 % IN WATER (D50W) INTRAVENOUS SYRINGE
25
Status: DISCONTINUED | OUTPATIENT
Start: 2018-03-20 | End: 2018-03-20

## 2018-03-20 RX ADMIN — DEXTROSE MONOHYDRATE 25 G: 25 INJECTION, SOLUTION INTRAVENOUS at 09:03

## 2018-03-20 RX ADMIN — SODIUM POLYSTYRENE SULFONATE 15 G: 15 SUSPENSION ORAL; RECTAL at 09:03

## 2018-03-20 RX ADMIN — SODIUM CHLORIDE 1000 ML: 0.9 INJECTION, SOLUTION INTRAVENOUS at 09:03

## 2018-03-20 RX ADMIN — ALBUTEROL SULFATE 10 MG: 2.5 SOLUTION RESPIRATORY (INHALATION) at 09:03

## 2018-03-20 RX ADMIN — ALLOPURINOL 100 MG: 100 TABLET ORAL at 09:03

## 2018-03-20 RX ADMIN — ISOSORBIDE MONONITRATE 120 MG: 60 TABLET, EXTENDED RELEASE ORAL at 09:03

## 2018-03-20 RX ADMIN — METRONIDAZOLE 500 MG: 500 TABLET ORAL at 11:03

## 2018-03-20 RX ADMIN — HEPARIN SODIUM 5000 UNITS: 5000 INJECTION, SOLUTION INTRAVENOUS; SUBCUTANEOUS at 09:03

## 2018-03-20 RX ADMIN — METOPROLOL TARTRATE 25 MG: 25 TABLET ORAL at 09:03

## 2018-03-20 RX ADMIN — FAMOTIDINE 20 MG: 20 TABLET, FILM COATED ORAL at 09:03

## 2018-03-20 RX ADMIN — METRONIDAZOLE 500 MG: 500 TABLET ORAL at 09:03

## 2018-03-20 RX ADMIN — SODIUM BICARBONATE: 84 INJECTION, SOLUTION INTRAVENOUS at 02:03

## 2018-03-20 RX ADMIN — CLOPIDOGREL 300 MG: 75 TABLET, FILM COATED ORAL at 06:03

## 2018-03-20 RX ADMIN — ATORVASTATIN CALCIUM 80 MG: 20 TABLET, FILM COATED ORAL at 09:03

## 2018-03-20 RX ADMIN — NIFEDIPINE 30 MG: 30 TABLET, FILM COATED, EXTENDED RELEASE ORAL at 09:03

## 2018-03-20 RX ADMIN — SODIUM BICARBONATE 100 MEQ: 84 INJECTION, SOLUTION INTRAVENOUS at 11:03

## 2018-03-20 RX ADMIN — HEPARIN SODIUM 5000 UNITS: 5000 INJECTION, SOLUTION INTRAVENOUS; SUBCUTANEOUS at 02:03

## 2018-03-20 RX ADMIN — INSULIN HUMAN 4 UNITS: 100 INJECTION, SOLUTION PARENTERAL at 09:03

## 2018-03-20 RX ADMIN — FERROUS SULFATE TAB EC 325 MG (65 MG FE EQUIVALENT) 325 MG: 325 (65 FE) TABLET DELAYED RESPONSE at 09:03

## 2018-03-20 NOTE — SUBJECTIVE & OBJECTIVE
Interval History: Pt reports feeling much better today than he had yesterday. Also endorses improvement in his UOP.     Review of Systems  Objective:     Vital Signs (Most Recent):  Temp: 97.9 °F (36.6 °C) (03/20/18 0747)  Pulse: 62 (03/20/18 0747)  Resp: 16 (03/20/18 0747)  BP: (!) 113/56 (03/20/18 0747)  SpO2: (!) 92 % (03/20/18 0747) Vital Signs (24h Range):  Temp:  [97.5 °F (36.4 °C)-98.5 °F (36.9 °C)] 97.9 °F (36.6 °C)  Pulse:  [56-70] 62  Resp:  [16-18] 16  SpO2:  [92 %-97 %] 92 %  BP: ()/(49-63) 113/56     Weight: 85.3 kg (188 lb 0.8 oz)  Body mass index is 31.29 kg/m².  No intake or output data in the 24 hours ending 03/20/18 0835   Physical Exam   Constitutional: He is oriented to person, place, and time. He appears well-developed and well-nourished.   HENT:   Head: Normocephalic and atraumatic.   Eyes: Conjunctivae and EOM are normal. Pupils are equal, round, and reactive to light.   Neck: Normal range of motion. Neck supple.   Cardiovascular: Normal rate, regular rhythm, normal heart sounds and intact distal pulses.    No murmur heard.  Pulmonary/Chest: Effort normal and breath sounds normal. No respiratory distress. He has no wheezes.   Abdominal: Soft. Bowel sounds are normal. He exhibits no distension and no mass. There is no tenderness. There is no guarding, no CVA tenderness, no tenderness at McBurney's point and negative Dove's sign.   Lymphadenopathy:     He has no cervical adenopathy.   Neurological: He is alert and oriented to person, place, and time. GCS eye subscore is 4. GCS verbal subscore is 5. GCS motor subscore is 6.   Skin: Skin is warm and dry.   Vitals reviewed.      Significant Labs: All pertinent labs within the past 24 hours have been reviewed.    Significant Imaging: I have reviewed all pertinent imaging results/findings within the past 24 hours.

## 2018-03-20 NOTE — CARE UPDATE
Patient's repeat troponin this afternoon with considerable elevation from 0.04 to 2.9. EKG without dynamic changes, patient was evaluated at bedside and was eating dinner comfortably without complaint, denied any CP/SOB. Spoke with the cardiology consult fellow and she made the recommendation to load patient on Plavix and continue serial troponin as patient was asymptomatic, INR already elevated at 4 and in the setting of ARF, medical management would be the most appropriate course regardless. Stat BMP ordered as well to determine if acidosis is worsening in which case MICU will be formally consulted for cental line placement and potentially dialysis as this procedure would likely be unsafe on the floor. Nephrology following. MICU being made aware of this patients condition in the interim, night float aware as well.     Too Muñoz MD  Internal Medicine PGY-1  [facility]

## 2018-03-20 NOTE — CONSULTS
[facility]  Nephrology  Consult Note    Patient Name: Dariel Urbina  MRN: [id number]  Admission Date: 3/19/2018  Hospital Length of Stay: 1 days  Attending Provider: Lamberto Miranda MD   Primary Care Physician: Devon Langston MD  Principal Problem:Acute renal failure superimposed on stage 3 chronic kidney disease    Inpatient consult to Nephrology  Consult performed by: NEETU GIANG  Consult ordered by: KHAN, BEHRAM        Subjective:     HPI: This is Mr. Dariel Urbina, 77 year old male with PMHx significant for PVD, CAD, mechanical aortic valve, on long term current use of anticoagulant therapy, HFpEF,  Hyperparathyroidism, GERD and HTN. He presented on 3/19/2018 with history of high output watery diarrhea for the last three days associated with weakness, fatigue and decrease oral intake, tried diarrhea medication with no success. No fever, sick contacts, recent exposure to antibiotics or recent hospitalization for other reasons. Nephrology consulted for Hyperkalemia in the setting of JIM.     Past Medical History:   Diagnosis Date    Bilateral carotid artery disease 2/9/2017    CAD (coronary atherosclerotic disease) 9/11/2012    Cataract     Chronic kidney disease     DM (diabetes mellitus) 9/26/2012    History of gout 9/26/2012    Hyperlipidemia     Hypertension     Mechanical heart valve present        Past Surgical History:   Procedure Laterality Date    CARDIAC CATHETERIZATION      CARDIAC VALVE REPLACEMENT      CARDIAC VALVE SURGERY      COLON SURGERY      COLONOSCOPY N/A 3/31/2017    Procedure: COLONOSCOPY;  Surgeon: Bruno Raymond MD;  Location: [address];  Service: Endoscopy;  Laterality: N/A;  Patient's wife requesting date.    CORONARY ANGIOPLASTY      CORONARY ARTERY BYPASS GRAFT      HERNIA REPAIR      SPINE SURGERY      VASECTOMY         Review of patient's allergies indicates:  No Known Allergies  Current Facility-Administered  Medications   Medication Frequency    acetaminophen suppository 650 mg Q4H PRN    albuterol nebulizer solution 10 mg Once    allopurinol tablet 100 mg Daily    atorvastatin tablet 80 mg Daily    insulin regular injection 4 Units Once    And    dextrose 50 % in water (D50W) injection 25 g Once    And    dextrose 50 % in water (D50W) injection 25 g PRN    dextrose 50% injection 12.5 g PRN    dextrose 50% injection 25 g PRN    famotidine tablet 20 mg Daily    ferrous sulfate EC tablet 325 mg Daily    glucagon (human recombinant) injection 1 mg PRN    glucose chewable tablet 16 g PRN    glucose chewable tablet 24 g PRN    insulin aspart U-100 pen 0-5 Units QID (AC + HS) PRN    isosorbide mononitrate 24 hr tablet 120 mg Daily    magnesium oxide tablet 800 mg PRN    metoprolol tartrate (LOPRESSOR) tablet 25 mg BID    NIFEdipine 24 hr tablet 30 mg Daily    potassium chloride 10% solution 40 mEq PRN    potassium, sodium phosphates 280-160-250 mg packet 2 packet PRN    ramelteon tablet 8 mg Nightly PRN    sodium chloride 0.9% flush 5 mL PRN    sodium polystyrene 15 gram/60 mL suspension 15 g Once     Family History     Problem Relation (Age of Onset)    Alcohol abuse Father    Diabetes Brother    Heart disease Mother, Father, Brother, Sister    Heart failure Mother, Father, Brother    No Known Problems Sister, Maternal Grandmother, Maternal Grandfather, Paternal Grandmother, Paternal Grandfather, Maternal Aunt, Maternal Uncle, Paternal Aunt, Paternal Uncle        Social History Main Topics    Smoking status: Former Smoker     Packs/day: 1.00     Years: 20.00     Quit date: 9/11/1980    Smokeless tobacco: Never Used    Alcohol use No    Drug use: No    Sexual activity: No     Review of Systems   Constitutional: Positive for activity change, appetite change and fatigue. Negative for fever.   HENT: Negative for congestion.    Eyes: Negative for discharge.   Respiratory: Negative for cough and  shortness of breath.    Cardiovascular: Negative for chest pain.   Gastrointestinal: Negative for abdominal distention, abdominal pain, constipation and diarrhea.   Endocrine: Negative for cold intolerance.   Genitourinary: Negative for difficulty urinating.   Musculoskeletal: Negative for back pain.   Neurological: Negative for weakness.   Psychiatric/Behavioral: Negative for agitation.     Objective:     Vital Signs (Most Recent):  Temp: 97.9 °F (36.6 °C) (03/20/18 0747)  Pulse: 62 (03/20/18 0747)  Resp: 16 (03/20/18 0747)  BP: (!) 113/56 (03/20/18 0747)  SpO2: (!) 92 % (03/20/18 0747)  O2 Device (Oxygen Therapy): room air (03/20/18 0747) Vital Signs (24h Range):  Temp:  [97.5 °F (36.4 °C)-98.5 °F (36.9 °C)] 97.9 °F (36.6 °C)  Pulse:  [56-70] 62  Resp:  [16-18] 16  SpO2:  [92 %-97 %] 92 %  BP: ()/(49-63) 113/56     Weight: 85.3 kg (188 lb 0.8 oz) (03/19/18 2303)  Body mass index is 31.29 kg/m².  Body surface area is 1.98 meters squared.    I/O last 3 completed shifts:  In: 1000 [IV Piggyback:1000]  Out: -     Physical Exam   Constitutional: He is oriented to person, place, and time. He appears well-developed and well-nourished.   HENT:   Head: Normocephalic.   Eyes: Pupils are equal, round, and reactive to light. Right eye exhibits no discharge. Left eye exhibits no discharge.   Neck: No JVD present.   Cardiovascular: Normal rate and regular rhythm.    Pulmonary/Chest: Effort normal. He has no wheezes. He has no rales.   Abdominal: Soft. He exhibits no distension. There is no tenderness.   Musculoskeletal: He exhibits no edema.   Neurological: He is alert and oriented to person, place, and time.   Skin: Skin is warm.   Vitals reviewed.    CBC:   Recent Labs  Lab 03/19/18  0530 03/20/18  0558   WBC 9.35 7.79   RBC 4.48* 3.90*   HGB 13.6* 11.7*   HCT 42.1 35.4*    146*   MCV 94 91   MCH 30.4 30.0   MCHC 32.3 33.1     BMP:   Recent Labs  Lab 03/19/18  0530 03/20/18  0558   GLU 73 72    133*   K 5.0  6.8*    109   CO2 14* 15*   BUN 85* 97*   CREATININE 6.6* 6.0*   CALCIUM 10.2 9.4   MG  --  1.4*     Coagulation:   Recent Labs  Lab 03/19/18  0530   INR 4.4*     Significant Labs:  All labs within the past 24 hours have been reviewed.    Assessment/Plan:     * Acute renal failure superimposed on stage 3 chronic kidney disease    - Known to have CKD stage III, with baseline GFR 30-60 and baseline sCr ~ 1.5-1.7  - Initial presentation with sCr 6.6 => 6.0   - Differential Diagnoses include pre-renal etiology given his history of acute diarrhea and decrease oral intake and slight improvement with low hydration.   - Will continue trending urine output and renal function    - Avoid NSAIDs, contrast, nephrotoxins, Monitor strict I/Os, daily weights  - Renally dose medications to current GFR  - Ordered renal ultrasonography to rule out obstruction or post renal etiology  - Consider more hydration (NaCl 1 L bolus), since he only received very gentle hydration of 75 cc/hr, don't overhydrate him in the setting of HFpEF.   - CXR, Ensure no obstruction by Post void bladder scan and Orthostatic vitals  - Low threshold to consult MICU for Troponinemia, HyperK and Metabolic acidosis       Metabolic acidosis with normal anion gap and bicarbonate losses    - Most likely etiology is Diarrhea and bicarb loss  - Expect to improve upon treating his JIM and Hyperkalemia   - Agree with Sodium Bicarbs 100 cc/hr (make sure it is Isotonic), trend his BMP Q 6.       Acute hyperkalemia    - Has multiple previous episodes of Hyperkalemia at outpatient visits, on one medication known to cause hyperkalemia (ACEi), which has been discontinued.   - ECG and Cardiac Tele  - Avoid Albuterol 10 mg and Polystyrene sulfonate (Kayexalate)  - Use Insulin and D5; cautious with insulin as he usually having Hypoglycemia.  - Repeat serum BMP(Q6H) to ensure resolution of problem.         Thank you for your consult. Staff attestation to follow. I will  follow-up with patient. Please contact us if you have any additional questions.    Dustin Ramsay MD  Nephrology  [facility]

## 2018-03-20 NOTE — HPI
This is . Dariel Urbina, 77 year old male with PMHx significant for PVD, CAD, mechanical aortic valve, on long term current use of anticoagulant therapy, HFpEF,  Hyperparathyroidism, GERD and HTN. He presented on 3/19/2018 with history of high output watery diarrhea for the last three days associated with weakness, fatigue and decrease oral intake, tried diarrhea medication with no success. No fever, sick contacts, recent exposure to antibiotics or recent hospitalization for other reasons. Nephrology consulted for Hyperkalemia in the setting of JIM.

## 2018-03-20 NOTE — ASSESSMENT & PLAN NOTE
ACUTE HYPERKALEMIA    Baseline BUN:Cr is 35:1.3; was 85 and 6.6 on admission. Likely prerenal by history. Denies suprapubic pain and nil pain on palpation. Secondary to dehydration resulting from his severe diarrhea. Will treat with fluid replacement. Bladder scan was negative in the emergency room.   - 2L LR bolus yesterday  - UOP remains low, but improved from yesterday  - Strict intake and output  - BUN increased to 97  - Cr decreased to 6.0   - VBG revealed pH of 7.232  - Will shift with 4 units of insulin, albuterol 3g, will encourage excretion with kayexalate.   - Nephrology consulted for hyperkalemia

## 2018-03-20 NOTE — ASSESSMENT & PLAN NOTE
- Has multiple previous episodes of Hyperkalemia at outpatient visits, not on medications known to cause hyperkalemia   - ECG and Cardiac Tele  - Agree with shifting treatment (Albuterol 10 mg), (Insulin and D5; cautious with insulin as he usually having Hypoglycemia), would recommend hold off Polystyrene sulfonate (Kayexalate) in the setting of Diarrhea.  - Repeat serum Potasium (Q6H) to ensure resolution of problem.

## 2018-03-20 NOTE — PLAN OF CARE
Problem: Patient Care Overview  Goal: Plan of Care Review  No acute changes overnight. Pt alert and oriented. Ambulatory and independent. Denies any pain when asked. Fluids d/c'd on shift. Pt voiding but low output (1 occurrence unmeasured). Pt educated on importance of strict intake and output. No other issues noted at this time. Will continue to monitor.

## 2018-03-20 NOTE — PHARMACY MED REC
"Admission Medication Reconciliation - Pharmacy Consult Note    The home medication history was taken by Nathalie Khan, pharmacy technician.  Based on information gathered and subsequent review by the clinical pharmacist, the items below may need attention.     You may go to "Admission" then "Reconcile Home Medications" tabs to review and/or act upon these items. Based on information gathered and subsequent review by the clinical pharmacist, the items below may need attention.    Potentially problematic discrepancies with current MAR  o Patient IS taking the following which was not ordered upon admit  o Zantac 300 mg QPM   o Fenofibrate 200 mg QAM   o Fosinopril 40 mg QD (held)   o Furosemide 20 mg QD (held)   o Amaryl 1 mg QAM  (held)   o Lovaza 2 gram BID  o Warfarin 7.5 mg M/W/F and 5 mg all other days (held)     Please address this information as you see fit.  Feel free to contact us if you have any questions or require assistance.    Radha Cottrell  EXT [id number]    Patient's prior to admission medication regimen was as follows:  Current Outpatient Prescriptions on File Prior to Encounter   Medication Sig Dispense Refill Last Dose    allopurinol (ZYLOPRIM) 100 MG tablet TAKE 1 TABLET ONE TIME DAILY 90 tablet 4 Taking    atorvastatin (LIPITOR) 80 MG tablet TAKE 1 TABLET ONE TIME DAILY 90 tablet 3 Taking    clotrimazole-betamethasone 1-0.05% (LOTRISONE) cream Apply topically once daily. in between the 4th and 5th toes left foot. 45 g 1     fenofibrate micronized (LOFIBRA) 200 MG Cap Take 200 mg by mouth daily with breakfast.   Taking    ferrous sulfate 325 (65 FE) MG EC tablet Take 1 tablet (325 mg total) by mouth once daily. 90 tablet 4 Taking    fosinopril (MONOPRIL) 40 MG tablet TAKE 1 TABLET ONE TIME DAILY 90 tablet 4 Taking    furosemide (LASIX) 20 MG tablet TAKE 1 TABLET ONE TIME DAILY 90 tablet 4 Taking    glimepiride (AMARYL) 1 MG tablet Take 1 tablet (1 mg total) by mouth every morning. 90 tablet 2 " Taking    isosorbide mononitrate (IMDUR) 120 MG 24 hr tablet Take 1 tablet (120 mg total) by mouth once daily. 90 tablet 4 Taking    metoprolol tartrate (LOPRESSOR) 50 MG tablet Take 1 tablet (50 mg total) by mouth once daily. 90 tablet 3 Taking    nifedipine (PROCARDIA-XL) 30 MG (OSM) 24 hr tablet Take 1 tablet (30 mg total) by mouth once daily. 30 tablet 11 Taking    nitroGLYCERIN (NITROSTAT) 0.4 MG SL tablet Place 1 tablet (0.4 mg total) under the tongue every 5 (five) minutes as needed. As needed for chest pain 90 tablet 4 Taking    omega-3 acid ethyl esters (LOVAZA) 1 gram capsule Take 2 capsules (2 g total) by mouth 2 (two) times daily. 360 capsule 5 Taking    ranitidine (ZANTAC) 300 MG tablet Take 1 tablet (300 mg total) by mouth every evening. 90 tablet 3 Taking    warfarin (COUMADIN) 5 MG tablet Take 1 tablet (5 mg total) by mouth Daily. Current Dose (7.5 mg on Mon, Wed, Fri; 5 mg all other days) 120 tablet 3 Taking         .    .

## 2018-03-20 NOTE — PROGRESS NOTES
[facility] Hospital Medicine  Progress Note    Patient Name: Dariel Urbina  MRN: [id number]  Patient Class: IP- Inpatient   Admission Date: 3/19/2018  Length of Stay: 1 days  Attending Physician: Lamberto Miranda MD  Primary Care Provider: Devon Langston MD    [facility] Medicine Team: [facility]OSP MED 3 Behram Khan, MD    Subjective:     Principal Problem:Acute renal failure superimposed on stage 3 chronic kidney disease    HPI:  Mr Urbina is a 76 yo male with PMH significant for HTN, CKD III, HLD, CAD, DMT2, gout, mechanical aortic valve, diastolic heart failure, and history of left-sided colectomy for diverticulosis who presents with diarrhea of three day duration with sudden onset. Diarrhea is watery nigh on clear without any mucus or blood. Pt c/o greater than 6 episodes of diarrhea per day for the past three days. Associated symptoms include weakness, fatigue and decreased urine output. He has not urinated for the past two days. He denies any abdominal pain or cramps currently. Pt has tried immodium, kaopectate and pepto bismal without any symptomatic benefit. He has never had an episode of diarrhea like this before. Pt cannot attribute this episode of diarrhea to any particular cause.     He denies any recent changes in diet or recent travel. Denies sick contacts, fevers, chills, headache, or confusion. Denies recent antibiotics or hospitalizations. Denies changes to medications. Review of systems was positive for shortness of breath, cough while eating, and decreased urine output. Pt also endorses a decreased appetite over the past two months resulting in an unintentional twenty pound weight loss.     Hospital Course:  No notes on file    Interval History: Pt reports feeling much better today than he had yesterday. Also endorses improvement in his UOP.     Review of Systems  Objective:     Vital Signs (Most Recent):  Temp: 97.9 °F (36.6 °C) (03/20/18 0747)  Pulse: 62 (03/20/18 0747)  Resp: 16  (03/20/18 0747)  BP: (!) 113/56 (03/20/18 0747)  SpO2: (!) 92 % (03/20/18 0747) Vital Signs (24h Range):  Temp:  [97.5 °F (36.4 °C)-98.5 °F (36.9 °C)] 97.9 °F (36.6 °C)  Pulse:  [56-70] 62  Resp:  [16-18] 16  SpO2:  [92 %-97 %] 92 %  BP: ()/(49-63) 113/56     Weight: 85.3 kg (188 lb 0.8 oz)  Body mass index is 31.29 kg/m².  No intake or output data in the 24 hours ending 03/20/18 0835   Physical Exam   Constitutional: He is oriented to person, place, and time. He appears well-developed and well-nourished.   HENT:   Head: Normocephalic and atraumatic.   Eyes: Conjunctivae and EOM are normal. Pupils are equal, round, and reactive to light.   Neck: Normal range of motion. Neck supple.   Cardiovascular: Normal rate, regular rhythm, normal heart sounds and intact distal pulses.    No murmur heard.  Pulmonary/Chest: Effort normal and breath sounds normal. No respiratory distress. He has no wheezes.   Abdominal: Soft. Bowel sounds are normal. He exhibits no distension and no mass. There is no tenderness. There is no guarding, no CVA tenderness, no tenderness at McBurney's point and negative Dove's sign.   Lymphadenopathy:     He has no cervical adenopathy.   Neurological: He is alert and oriented to person, place, and time. GCS eye subscore is 4. GCS verbal subscore is 5. GCS motor subscore is 6.   Skin: Skin is warm and dry.   Vitals reviewed.      Significant Labs: All pertinent labs within the past 24 hours have been reviewed.    Significant Imaging: I have reviewed all pertinent imaging results/findings within the past 24 hours.    Assessment/Plan:      * Acute renal failure superimposed on stage 3 chronic kidney disease    ACUTE HYPERKALEMIA    Baseline BUN:Cr is 35:1.3; was 85 and 6.6 on admission. Likely prerenal by history. Denies suprapubic pain and nil pain on palpation. Secondary to dehydration resulting from his severe diarrhea. Will treat with fluid replacement. Bladder scan was negative in the  emergency room.   - 2L LR bolus yesterday  - UOP remains low, but improved from yesterday  - Strict intake and output  - BUN increased to 97  - Cr decreased to 6.0   - VBG revealed pH of 7.232  - Will shift with 4 units of insulin, albuterol 3g, will encourage excretion with kayexalate.   - Nephrology consulted for hyperkalemia               Metabolic acidosis with normal anion gap and bicarbonate losses    SEVERE DIARRHEA    Three day history of profuse watery diarrhea with resultant dehydration. Pt's bicarbonate was 14 in the emergency room. Greater than 6 bowel movements per day. Pt's pH is 7.232. Bicarbonate remains low at 16. Anion gap is normal.   - Lactated ringer's to avoid hyperchloremic metabolic acidosis. Nil peritonitic signs, nil rebound tenderness or guarding.   - C. Diff EIA  - Stool cultures  - KUB flat and erect  - Nil empiric antibiotic  - Nil antimotility agent until infectious etiology is ruled out    - Urine sodium, urine potassium and urine chloride for urine anion gap   - Will evaluate for RTA   - Most likely related to his severe diarrhea          CAD (coronary atherosclerotic disease)    PERIPHERAL VASCULAR DISEASE    Takes high-intensity statin. Also has nitroglycerin 0.4 mg PRN. Denies every requiring nitro. Nil aspirin in the outpatient setting.     - Atorvastatin 80 mg QD  - Isosorbide mononitrate 120 mg qd              H/O mechanical aortic valve replacement    LONG TERM CURRENT USE OF ANTICOAGULATION THERAPY    Pt on anticoagulation at home with daily warfarin. INR was 4.4 on admission. Has decreased PO intake for the past two months   - Hold wafarin for now          Renovascular hypertension    Home regimen includes fosinopril 40 mg qd, nifedipine 30 mg qd.  - Will hold fosinopril in setting of JIM  - Continue nifedipine.             History of gout    Allopurinol 100 mg qd          Left ventricular diastolic dysfunction with preserved systolic function    ACE-inhibitor and furosemide  at home. Will hold in setting of prerenal JIM.             Type 2 diabetes mellitus with diabetic peripheral angiopathy without gangrene    A1c was 5.5. On glimiperide at home.   - SSI           Hyperlipidemia    On fenofibrate 200 mg at home and atorvastatin 80 mg qd  - Will continue statin          CKD (chronic kidney disease), stage III    HYPERPARATHYROIDISM DUE TO RENAL INSUFFICIENCY  ANEMIA OF CHRONIC RENAL FAILURE, STAGE 3    GFR is usually in the 50's in the setting of high blood pressure. Corrected calcium was 10.0 mg/dL.  - Will continue to monitor and treat his acute renal failure.   - Continue ferrous sulfate, 325 mg qd (per outpatient regimen)          Gastroesophageal reflux disease without esophagitis    On H2-inhibitor, famotidine at home.   - Will hold in setting of JIM          Obesity, diabetes, and hypertension syndrome    Pt reports 20 lbs of unintentional weight loss d/t decreased appetite   - Cardiac diet          Acute hyperkalemia              Severe diarrhea                Hyperparathyroidism due to renal insufficiency              Anemia of chronic renal failure, stage 3 (moderate)              History of aortic valve repair              Long term current use of anticoagulant therapy              PVD (peripheral vascular disease)                VTE Risk Mitigation     None              Behram Khan, MD  Department of Hospital Medicine   [facility]

## 2018-03-20 NOTE — PROGRESS NOTES
Radiology Progress Note    Mr Urbina is a 78 yo male who presented to Ultrasound department for retroperitoneal ultrasound. Upon arrival to radiology department patient complained of new onset chest pain beginning during transport. Patient seen at bedside in mild physical distress. Pain is described as substernal, burning pain without radiation. Additionally, patient reports associated weakness and mild SOB.  Primary team notified who states they will see patient at this time for further evaluation. Will plan to reschedule retroperitoneal ultrasound once patients acute symptoms have resolved.     Eulogio Back MD  PGY-III  Dept of Radiology   Pager: [phone]

## 2018-03-20 NOTE — ASSESSMENT & PLAN NOTE
- Most likely etiology is Diarrhea and bicarb loss  - Expect to improve upon treating his JIM and Hyperkalemia   - If no improvement occur, will plan to start temporary oral sodium bicarp 650 mg PO BID

## 2018-03-20 NOTE — ASSESSMENT & PLAN NOTE
SEVERE DIARRHEA    Three day history of profuse watery diarrhea with resultant dehydration. Pt's bicarbonate was 14 in the emergency room. Greater than 6 bowel movements per day. Pt's pH is 7.232. Bicarbonate remains low at 16. Anion gap is normal.   - Lactated ringer's to avoid hyperchloremic metabolic acidosis. Nil peritonitic signs, nil rebound tenderness or guarding.   - C. Diff EIA  - Stool cultures  - KUB flat and erect  - Nil empiric antibiotic  - Nil antimotility agent until infectious etiology is ruled out    - Urine sodium, urine potassium and urine chloride for urine anion gap   - Will evaluate for RTA   - Most likely related to his severe diarrhea

## 2018-03-20 NOTE — SUBJECTIVE & OBJECTIVE
Past Medical History:   Diagnosis Date    Bilateral carotid artery disease 2/9/2017    CAD (coronary atherosclerotic disease) 9/11/2012    Cataract     Chronic kidney disease     DM (diabetes mellitus) 9/26/2012    History of gout 9/26/2012    Hyperlipidemia     Hypertension     Mechanical heart valve present        Past Surgical History:   Procedure Laterality Date    CARDIAC CATHETERIZATION      CARDIAC VALVE REPLACEMENT      CARDIAC VALVE SURGERY      COLON SURGERY      COLONOSCOPY N/A 3/31/2017    Procedure: COLONOSCOPY;  Surgeon: Bruno Raymond MD;  Location: [facility] ([address] [place] [address]);  Service: Endoscopy;  Laterality: N/A;  Patient's wife requesting date.    CORONARY ANGIOPLASTY      CORONARY ARTERY BYPASS GRAFT      HERNIA REPAIR      SPINE SURGERY      VASECTOMY         Review of patient's allergies indicates:  No Known Allergies  Current Facility-Administered Medications   Medication Frequency    acetaminophen suppository 650 mg Q4H PRN    albuterol nebulizer solution 10 mg Once    allopurinol tablet 100 mg Daily    atorvastatin tablet 80 mg Daily    insulin regular injection 4 Units Once    And    dextrose 50 % in water (D50W) injection 25 g Once    And    dextrose 50 % in water (D50W) injection 25 g PRN    dextrose 50% injection 12.5 g PRN    dextrose 50% injection 25 g PRN    famotidine tablet 20 mg Daily    ferrous sulfate EC tablet 325 mg Daily    glucagon (human recombinant) injection 1 mg PRN    glucose chewable tablet 16 g PRN    glucose chewable tablet 24 g PRN    insulin aspart U-100 pen 0-5 Units QID (AC + HS) PRN    isosorbide mononitrate 24 hr tablet 120 mg Daily    magnesium oxide tablet 800 mg PRN    metoprolol tartrate (LOPRESSOR) tablet 25 mg BID    NIFEdipine 24 hr tablet 30 mg Daily    potassium chloride 10% solution 40 mEq PRN    potassium, sodium phosphates 280-160-250 mg packet 2 packet PRN    ramelteon tablet 8 mg Nightly PRN    sodium chloride  0.9% flush 5 mL PRN    sodium polystyrene 15 gram/60 mL suspension 15 g Once     Family History     Problem Relation (Age of Onset)    Alcohol abuse Father    Diabetes Brother    Heart disease Mother, Father, Brother, Sister    Heart failure Mother, Father, Brother    No Known Problems Sister, Maternal Grandmother, Maternal Grandfather, Paternal Grandmother, Paternal Grandfather, Maternal Aunt, Maternal Uncle, Paternal Aunt, Paternal Uncle        Social History Main Topics    Smoking status: Former Smoker     Packs/day: 1.00     Years: 20.00     Quit date: 9/11/1980    Smokeless tobacco: Never Used    Alcohol use No    Drug use: No    Sexual activity: No     Review of Systems   Constitutional: Positive for activity change, appetite change and fatigue. Negative for fever.   HENT: Negative for congestion.    Eyes: Negative for discharge.   Respiratory: Negative for cough and shortness of breath.    Cardiovascular: Negative for chest pain.   Gastrointestinal: Negative for abdominal distention, abdominal pain, constipation and diarrhea.   Endocrine: Negative for cold intolerance.   Genitourinary: Negative for difficulty urinating.   Musculoskeletal: Negative for back pain.   Neurological: Negative for weakness.   Psychiatric/Behavioral: Negative for agitation.     Objective:     Vital Signs (Most Recent):  Temp: 97.9 °F (36.6 °C) (03/20/18 0747)  Pulse: 62 (03/20/18 0747)  Resp: 16 (03/20/18 0747)  BP: (!) 113/56 (03/20/18 0747)  SpO2: (!) 92 % (03/20/18 0747)  O2 Device (Oxygen Therapy): room air (03/20/18 0747) Vital Signs (24h Range):  Temp:  [97.5 °F (36.4 °C)-98.5 °F (36.9 °C)] 97.9 °F (36.6 °C)  Pulse:  [56-70] 62  Resp:  [16-18] 16  SpO2:  [92 %-97 %] 92 %  BP: ()/(49-63) 113/56     Weight: 85.3 kg (188 lb 0.8 oz) (03/19/18 2303)  Body mass index is 31.29 kg/m².  Body surface area is 1.98 meters squared.    I/O last 3 completed shifts:  In: 1000 [IV Piggyback:1000]  Out: -     Physical Exam    Constitutional: He is oriented to person, place, and time. He appears well-developed and well-nourished.   HENT:   Head: Normocephalic.   Eyes: Pupils are equal, round, and reactive to light. Right eye exhibits no discharge. Left eye exhibits no discharge.   Neck: No JVD present.   Cardiovascular: Normal rate and regular rhythm.    Pulmonary/Chest: Effort normal. He has no wheezes. He has no rales.   Abdominal: Soft. He exhibits no distension. There is no tenderness.   Musculoskeletal: He exhibits no edema.   Neurological: He is alert and oriented to person, place, and time.   Skin: Skin is warm.   Vitals reviewed.    CBC:   Recent Labs  Lab 03/19/18 0530 03/20/18  0558   WBC 9.35 7.79   RBC 4.48* 3.90*   HGB 13.6* 11.7*   HCT 42.1 35.4*    146*   MCV 94 91   MCH 30.4 30.0   MCHC 32.3 33.1     BMP:   Recent Labs  Lab 03/19/18 0530 03/20/18  0558   GLU 73 72    133*   K 5.0 6.8*    109   CO2 14* 15*   BUN 85* 97*   CREATININE 6.6* 6.0*   CALCIUM 10.2 9.4   MG  --  1.4*     Coagulation:   Recent Labs  Lab 03/19/18  0530   INR 4.4*     Significant Labs:  All labs within the past 24 hours have been reviewed.

## 2018-03-21 PROBLEM — E87.5 ACUTE HYPERKALEMIA: Status: RESOLVED | Noted: 2018-03-20 | Resolved: 2018-03-21

## 2018-03-21 PROBLEM — R04.0 BLEEDING NOSE: Status: ACTIVE | Noted: 2018-03-21

## 2018-03-21 PROBLEM — I21.4 NSTEMI (NON-ST ELEVATED MYOCARDIAL INFARCTION): Status: ACTIVE | Noted: 2018-03-21

## 2018-03-21 LAB
ALBUMIN SERPL BCP-MCNC: 3.4 G/DL
ALBUMIN SERPL BCP-MCNC: 3.4 G/DL
ALBUMIN SERPL ELPH-MCNC: 4.2 G/DL
ALP SERPL-CCNC: 77 U/L
ALPHA1 GLOB SERPL ELPH-MCNC: 0.35 G/DL
ALPHA2 GLOB SERPL ELPH-MCNC: 0.76 G/DL
ALT SERPL W/O P-5'-P-CCNC: 13 U/L
ANION GAP SERPL CALC-SCNC: 10 MMOL/L
ANION GAP SERPL CALC-SCNC: 11 MMOL/L
ANION GAP SERPL CALC-SCNC: 8 MMOL/L
AST SERPL-CCNC: 43 U/L
B-GLOBULIN SERPL ELPH-MCNC: 0.81 G/DL
BASOPHILS # BLD AUTO: 0.01 K/UL
BASOPHILS # BLD AUTO: 0.02 K/UL
BASOPHILS NFR BLD: 0.2 %
BASOPHILS NFR BLD: 0.3 %
BILIRUB SERPL-MCNC: 0.7 MG/DL
BUN SERPL-MCNC: 84 MG/DL
BUN SERPL-MCNC: 89 MG/DL
BUN SERPL-MCNC: 92 MG/DL
BUN SERPL-MCNC: 93 MG/DL
BUN SERPL-MCNC: 96 MG/DL
CALCIUM SERPL-MCNC: 8.6 MG/DL
CALCIUM SERPL-MCNC: 8.9 MG/DL
CALCIUM SERPL-MCNC: 8.9 MG/DL
CALCIUM SERPL-MCNC: 9 MG/DL
CALCIUM SERPL-MCNC: 9.5 MG/DL
CHLORIDE SERPL-SCNC: 107 MMOL/L
CHLORIDE SERPL-SCNC: 107 MMOL/L
CHLORIDE SERPL-SCNC: 108 MMOL/L
CHLORIDE SERPL-SCNC: 109 MMOL/L
CHLORIDE SERPL-SCNC: 110 MMOL/L
CHLORIDE STL-SCNC: 68 MMOL/L
CO2 SERPL-SCNC: 18 MMOL/L
CO2 SERPL-SCNC: 19 MMOL/L
CO2 SERPL-SCNC: 20 MMOL/L
CO2 SERPL-SCNC: 20 MMOL/L
CO2 SERPL-SCNC: 23 MMOL/L
CREAT SERPL-MCNC: 3 MG/DL
CREAT SERPL-MCNC: 3.6 MG/DL
CREAT SERPL-MCNC: 3.7 MG/DL
CREAT SERPL-MCNC: 4.4 MG/DL
DIASTOLIC DYSFUNCTION: NO
DIFFERENTIAL METHOD: ABNORMAL
DIFFERENTIAL METHOD: ABNORMAL
EOSINOPHIL # BLD AUTO: 0.2 K/UL
EOSINOPHIL # BLD AUTO: 0.2 K/UL
EOSINOPHIL NFR BLD: 2.6 %
EOSINOPHIL NFR BLD: 2.9 %
ERYTHROCYTE [DISTWIDTH] IN BLOOD BY AUTOMATED COUNT: 13.6 %
ERYTHROCYTE [DISTWIDTH] IN BLOOD BY AUTOMATED COUNT: 13.8 %
EST. GFR  (AFRICAN AMERICAN): 14 ML/MIN/1.73 M^2
EST. GFR  (AFRICAN AMERICAN): 17.3 ML/MIN/1.73 M^2
EST. GFR  (AFRICAN AMERICAN): 17.9 ML/MIN/1.73 M^2
EST. GFR  (AFRICAN AMERICAN): 22.3 ML/MIN/1.73 M^2
EST. GFR  (NON AFRICAN AMERICAN): 12.1 ML/MIN/1.73 M^2
EST. GFR  (NON AFRICAN AMERICAN): 15 ML/MIN/1.73 M^2
EST. GFR  (NON AFRICAN AMERICAN): 15.5 ML/MIN/1.73 M^2
EST. GFR  (NON AFRICAN AMERICAN): 19.3 ML/MIN/1.73 M^2
GAMMA GLOB SERPL ELPH-MCNC: 1.48 G/DL
GLUCOSE SERPL-MCNC: 101 MG/DL
GLUCOSE SERPL-MCNC: 101 MG/DL
GLUCOSE SERPL-MCNC: 103 MG/DL
GLUCOSE SERPL-MCNC: 104 MG/DL
GLUCOSE SERPL-MCNC: 105 MG/DL
GLUCOSE SERPL-MCNC: 108 MG/DL
GLUCOSE SERPL-MCNC: 131 MG/DL
HCT VFR BLD AUTO: 32 %
HCT VFR BLD AUTO: 32.3 %
HGB BLD-MCNC: 10.9 G/DL
HGB BLD-MCNC: 11 G/DL
IMM GRANULOCYTES # BLD AUTO: 0.02 K/UL
IMM GRANULOCYTES # BLD AUTO: 0.02 K/UL
IMM GRANULOCYTES NFR BLD AUTO: 0.3 %
IMM GRANULOCYTES NFR BLD AUTO: 0.3 %
INR PPP: 2.7
LYMPHOCYTES # BLD AUTO: 1.6 K/UL
LYMPHOCYTES # BLD AUTO: 1.8 K/UL
LYMPHOCYTES NFR BLD: 27.4 %
LYMPHOCYTES NFR BLD: 28.7 %
MAGNESIUM FLD-MCNC: 5 MG/DL
MAGNESIUM SERPL-MCNC: 1 MG/DL
MCH RBC QN AUTO: 30.2 PG
MCH RBC QN AUTO: 30.4 PG
MCHC RBC AUTO-ENTMCNC: 34.1 G/DL
MCHC RBC AUTO-ENTMCNC: 34.1 G/DL
MCV RBC AUTO: 89 FL
MCV RBC AUTO: 89 FL
MITRAL VALVE MOBILITY: NORMAL
MITRAL VALVE REGURGITATION: NORMAL
MONOCYTES # BLD AUTO: 0.6 K/UL
MONOCYTES # BLD AUTO: 0.8 K/UL
MONOCYTES NFR BLD: 10.8 %
MONOCYTES NFR BLD: 12 %
NEUTROPHILS # BLD AUTO: 3.5 K/UL
NEUTROPHILS # BLD AUTO: 3.5 K/UL
NEUTROPHILS NFR BLD: 56.1 %
NEUTROPHILS NFR BLD: 58.4 %
NRBC BLD-RTO: 0 /100 WBC
NRBC BLD-RTO: 0 /100 WBC
OSMOL GAP STL: -40 MOSM/KG
OSMOLALITY STL: 321 MOSM/KG
PHOSPHATE SERPL-MCNC: 3.3 MG/DL
PHOSPHATE SERPL-MCNC: 3.4 MG/DL
PHOSPHORUS, FECES: 40 MG/DL
PLATELET # BLD AUTO: 134 K/UL
PLATELET # BLD AUTO: 143 K/UL
PMV BLD AUTO: 11 FL
PMV BLD AUTO: 11.4 FL
POTASSIUM FLD-SCNC: 30 MMOL/L
POTASSIUM SERPL-SCNC: 4.3 MMOL/L
POTASSIUM SERPL-SCNC: 4.4 MMOL/L
POTASSIUM SERPL-SCNC: 4.5 MMOL/L
POTASSIUM SERPL-SCNC: 4.6 MMOL/L
POTASSIUM SERPL-SCNC: 4.8 MMOL/L
PROT 24H UR-MRATE: 196 MG/SPEC
PROT SERPL-MCNC: 6.9 G/DL
PROT SERPL-MCNC: 7.6 G/DL
PROT UR-MCNC: 8 MG/DL
PROTHROMBIN TIME: 26.4 SEC
RBC # BLD AUTO: 3.58 M/UL
RBC # BLD AUTO: 3.64 M/UL
RETIRED EF AND QEF - SEE NOTES: 50 (ref 55–65)
SODIUM SERPL-SCNC: 137 MMOL/L
SODIUM SERPL-SCNC: 138 MMOL/L
SODIUM SERPL-SCNC: 139 MMOL/L
SODIUM SERPL-SCNC: 140 MMOL/L
SODIUM SERPL-SCNC: 140 MMOL/L
SODIUM STL-SCNC: 135 MMOL/L
TROPONIN I SERPL DL<=0.01 NG/ML-MCNC: 10.56 NG/ML
TROPONIN I SERPL DL<=0.01 NG/ML-MCNC: 13.09 NG/ML
TROPONIN I SERPL DL<=0.01 NG/ML-MCNC: 6.63 NG/ML
TROPONIN I SERPL DL<=0.01 NG/ML-MCNC: 7.89 NG/ML
URINE COLLECTION DURATION: 24 HR
URINE VOLUME: 2450 ML
WBC # BLD AUTO: 5.91 K/UL
WBC # BLD AUTO: 6.23 K/UL

## 2018-03-21 PROCEDURE — 80053 COMPREHEN METABOLIC PANEL: CPT

## 2018-03-21 PROCEDURE — 84484 ASSAY OF TROPONIN QUANT: CPT | Mod: 91

## 2018-03-21 PROCEDURE — 80048 BASIC METABOLIC PNL TOTAL CA: CPT | Mod: 91

## 2018-03-21 PROCEDURE — 99232 SBSQ HOSP IP/OBS MODERATE 35: CPT | Mod: GC,,, | Performed by: INTERNAL MEDICINE

## 2018-03-21 PROCEDURE — 25000003 PHARM REV CODE 250: Performed by: STUDENT IN AN ORGANIZED HEALTH CARE EDUCATION/TRAINING PROGRAM

## 2018-03-21 PROCEDURE — 84166 PROTEIN E-PHORESIS/URINE/CSF: CPT | Mod: 26,,, | Performed by: PATHOLOGY

## 2018-03-21 PROCEDURE — 84100 ASSAY OF PHOSPHORUS: CPT

## 2018-03-21 PROCEDURE — 99233 SBSQ HOSP IP/OBS HIGH 50: CPT | Mod: GC,,, | Performed by: INTERNAL MEDICINE

## 2018-03-21 PROCEDURE — 63600175 PHARM REV CODE 636 W HCPCS: Performed by: STUDENT IN AN ORGANIZED HEALTH CARE EDUCATION/TRAINING PROGRAM

## 2018-03-21 PROCEDURE — 51798 US URINE CAPACITY MEASURE: CPT

## 2018-03-21 PROCEDURE — 85610 PROTHROMBIN TIME: CPT

## 2018-03-21 PROCEDURE — 80069 RENAL FUNCTION PANEL: CPT

## 2018-03-21 PROCEDURE — 84166 PROTEIN E-PHORESIS/URINE/CSF: CPT

## 2018-03-21 PROCEDURE — 93306 TTE W/DOPPLER COMPLETE: CPT | Mod: 26,,, | Performed by: INTERNAL MEDICINE

## 2018-03-21 PROCEDURE — 93307 TTE W/O DOPPLER COMPLETE: CPT

## 2018-03-21 PROCEDURE — 85025 COMPLETE CBC W/AUTO DIFF WBC: CPT

## 2018-03-21 PROCEDURE — 36415 COLL VENOUS BLD VENIPUNCTURE: CPT

## 2018-03-21 PROCEDURE — 84156 ASSAY OF PROTEIN URINE: CPT

## 2018-03-21 PROCEDURE — 20600001 HC STEP DOWN PRIVATE ROOM

## 2018-03-21 PROCEDURE — 83735 ASSAY OF MAGNESIUM: CPT

## 2018-03-21 PROCEDURE — 99222 1ST HOSP IP/OBS MODERATE 55: CPT | Mod: ,,, | Performed by: INTERNAL MEDICINE

## 2018-03-21 RX ORDER — SODIUM CHLORIDE 9 MG/ML
INJECTION, SOLUTION INTRAVENOUS CONTINUOUS
Status: DISCONTINUED | OUTPATIENT
Start: 2018-03-21 | End: 2018-03-21

## 2018-03-21 RX ORDER — WARFARIN 2.5 MG/1
5 TABLET ORAL DAILY
Status: DISCONTINUED | OUTPATIENT
Start: 2018-03-21 | End: 2018-03-22

## 2018-03-21 RX ORDER — OXYMETAZOLINE HCL 0.05 %
1 SPRAY, NON-AEROSOL (ML) NASAL 2 TIMES DAILY
Status: COMPLETED | OUTPATIENT
Start: 2018-03-21 | End: 2018-03-23

## 2018-03-21 RX ORDER — SODIUM BICARBONATE 650 MG/1
1 TABLET ORAL DAILY
Status: DISCONTINUED | OUTPATIENT
Start: 2018-03-21 | End: 2018-03-23

## 2018-03-21 RX ORDER — ASPIRIN 325 MG
325 TABLET ORAL DAILY
Status: DISCONTINUED | OUTPATIENT
Start: 2018-03-21 | End: 2018-03-21

## 2018-03-21 RX ORDER — MAGNESIUM SULFATE/D5W 2 G/50 ML
2 INTRAVENOUS SOLUTION, PIGGYBACK (ML) INTRAVENOUS
Status: COMPLETED | OUTPATIENT
Start: 2018-03-21 | End: 2018-03-21

## 2018-03-21 RX ORDER — ASPIRIN 81 MG/1
81 TABLET ORAL DAILY
Status: DISCONTINUED | OUTPATIENT
Start: 2018-03-21 | End: 2018-03-28 | Stop reason: HOSPADM

## 2018-03-21 RX ADMIN — WARFARIN SODIUM 5 MG: 2.5 TABLET ORAL at 05:03

## 2018-03-21 RX ADMIN — METRONIDAZOLE 500 MG: 500 TABLET ORAL at 08:03

## 2018-03-21 RX ADMIN — SODIUM BICARBONATE: 84 INJECTION, SOLUTION INTRAVENOUS at 01:03

## 2018-03-21 RX ADMIN — FERROUS SULFATE TAB EC 325 MG (65 MG FE EQUIVALENT) 325 MG: 325 (65 FE) TABLET DELAYED RESPONSE at 08:03

## 2018-03-21 RX ADMIN — ASPIRIN 81 MG: 81 TABLET, COATED ORAL at 03:03

## 2018-03-21 RX ADMIN — SODIUM CHLORIDE: 0.9 INJECTION, SOLUTION INTRAVENOUS at 10:03

## 2018-03-21 RX ADMIN — METOPROLOL TARTRATE 25 MG: 25 TABLET ORAL at 08:03

## 2018-03-21 RX ADMIN — ATORVASTATIN CALCIUM 80 MG: 20 TABLET, FILM COATED ORAL at 09:03

## 2018-03-21 RX ADMIN — ASPIRIN 325 MG ORAL TABLET 325 MG: 325 PILL ORAL at 06:03

## 2018-03-21 RX ADMIN — CLOPIDOGREL BISULFATE 75 MG: 75 TABLET, FILM COATED ORAL at 08:03

## 2018-03-21 RX ADMIN — OXYMETAZOLINE HYDROCHLORIDE 1 SPRAY: 5 SPRAY NASAL at 03:03

## 2018-03-21 RX ADMIN — HEPARIN SODIUM 5000 UNITS: 5000 INJECTION, SOLUTION INTRAVENOUS; SUBCUTANEOUS at 06:03

## 2018-03-21 RX ADMIN — FAMOTIDINE 20 MG: 20 TABLET, FILM COATED ORAL at 08:03

## 2018-03-21 RX ADMIN — OXYMETAZOLINE HYDROCHLORIDE 1 SPRAY: 5 SPRAY NASAL at 08:03

## 2018-03-21 RX ADMIN — Medication 2 G: at 05:03

## 2018-03-21 RX ADMIN — Medication 2 G: at 03:03

## 2018-03-21 RX ADMIN — SODIUM BICARBONATE 650 MG TABLET 650 MG: at 10:03

## 2018-03-21 RX ADMIN — NIFEDIPINE 30 MG: 30 TABLET, FILM COATED, EXTENDED RELEASE ORAL at 09:03

## 2018-03-21 RX ADMIN — ALLOPURINOL 100 MG: 100 TABLET ORAL at 08:03

## 2018-03-21 RX ADMIN — ISOSORBIDE MONONITRATE 120 MG: 60 TABLET, EXTENDED RELEASE ORAL at 08:03

## 2018-03-21 NOTE — HPI
Patient is a 76 yo male with a past medical hx significant for CAD (s/p CABG in 2002 - LIMA-LAD, SVG-OM2, multiple PCIs), AS (s/p mechanical AVR), HTN, HLD, PAD, T2DM, CKD III [place] [facility] [place] with a hx of watery diarrhea for the last three days and profound JIM on CKD as indicated by his labwork. He also complained for weakness, fatigue, and decreased oral intake. On this admission, the primary team has consulted nephrology for a pre-renal etiology behind his JIM. Today when the patient went down for his US retroperitoneal he started to have substernal, burning pain without radiation. Additionally, patient reported associated weakness and mild SOB. His EKG showed his pre-existing LBBB and NSR with 1st AV block. Patient's cp spontaneously resolved and his first troponin came back at 0.042. It has since trended upwards to 13 just recently. Notably his Cr on admit was 6.6 and has since trended downwards to 4.4. Also on labwork the patient shows that he is losing his bicarb and having a non-gap metabolic acidosis secondary to this (pH is 7.232 and bicarb is 16). His INR is also elevated at 4.4. Cardiology was consulted for the troponin rise to 13 in a patient with severe JIM.     Currently, the patient is hemodynamically stable and is adequately BB'd. Denies any chest pain or SOB currently. CXR shows no signs of pulmonary overload.     Previously patient had a PET stress in 2016 which showed mild ischemia in the distal inferolateral wall in the distribution of the distal LCx. Previous 2D echo with CFD in 05/17 showed an EF of 60-65%, no regional wall abnormalities, normal RV, Mod-severe MR and mod TR.

## 2018-03-21 NOTE — CONSULTS
[facility]  Cardiology  Consult Note    Patient Name: Dariel Urbina  MRN: [id number]  Admission Date: 3/19/2018  Hospital Length of Stay: 2 days  Code Status: Full Code   Attending Provider: Lamberto Miranda MD   Consulting Provider: Fito Herrera MD  Primary Care Physician: Devon Langston MD  Principal Problem:Acute renal failure superimposed on stage 3 chronic kidney disease    Patient information was obtained from patient, past medical records and ER records.     Inpatient consult to Cardiology  Consult performed by: FITO HERRERA  Consult ordered by: MIGUEL GHOSH  Reason for consult: NSTEMI        Subjective:     Chief Complaint:  NSTEMI     HPI:   Patient is a 78 yo male with a past medical hx significant for CAD (s/p CABG in 2002 - LIMA-LAD, SVG-OM2, multiple PCIs), AS (s/p mechanical AVR), HTN, HLD, PAD, T2DM, CKD III [facility] with a hx of watery diarrhea for the last three days and profound JIM on CKD as indicated by his labwork. He also complained for weakness, fatigue, and decreased oral intake. On this admission, the primary team has consulted nephrology for a pre-renal etiology behind his JIM. Today when the patient went down for his US retroperitoneal he started to have substernal, burning pain without radiation. Additionally, patient reported associated weakness and mild SOB. His EKG showed his pre-existing LBBB and NSR with 1st AV block. Patient's cp spontaneously resolved and his first troponin came back at 0.042. It has since trended upwards to 13 just recently. Notably his Cr on admit was 6.6 and has since trended downwards to 4.4. Also on labwork the patient shows that he is losing his bicarb and having a non-gap metabolic acidosis secondary to this (pH is 7.232 and bicarb is 16). His INR is also elevated at 4.4. Cardiology was consulted for the troponin rise to 13 in a patient with severe JIM.     Currently, the patient is  hemodynamically stable and is adequately BB'd. Denies any chest pain and reports mild SOB (chronic and unchanged). Has chronic SOB as well. CXR shows no signs of pulmonary overload.     Previously patient had a PET stress in 2016 which showed mild ischemia in the distal inferolateral wall in the distribution of the distal LCx. Previous 2D echo with CFD in 05/17 showed an EF of 60-65%, no regional wall abnormalities, normal RV, Mod-severe MR and mod TR.    Past Medical History:   Diagnosis Date    Bilateral carotid artery disease 2/9/2017    CAD (coronary atherosclerotic disease) 9/11/2012    Cataract     Chronic kidney disease     DM (diabetes mellitus) 9/26/2012    History of gout 9/26/2012    Hyperlipidemia     Hypertension     Mechanical heart valve present        Past Surgical History:   Procedure Laterality Date    CARDIAC CATHETERIZATION      CARDIAC VALVE REPLACEMENT      CARDIAC VALVE SURGERY      COLON SURGERY      COLONOSCOPY N/A 3/31/2017    Procedure: COLONOSCOPY;  Surgeon: Bruno Raymond MD;  [facility] ([address] [place] [address]);  Service: Endoscopy;  Laterality: N/A;  Patient's wife requesting date.    CORONARY ANGIOPLASTY      CORONARY ARTERY BYPASS GRAFT      HERNIA REPAIR      SPINE SURGERY      VASECTOMY         Review of patient's allergies indicates:  No Known Allergies    No current facility-administered medications on file prior to encounter.      Current Outpatient Prescriptions on File Prior to Encounter   Medication Sig    allopurinol (ZYLOPRIM) 100 MG tablet TAKE 1 TABLET ONE TIME DAILY    atorvastatin (LIPITOR) 80 MG tablet TAKE 1 TABLET ONE TIME DAILY    clotrimazole-betamethasone 1-0.05% (LOTRISONE) cream Apply topically once daily. in between the 4th and 5th toes left foot.    fenofibrate micronized (LOFIBRA) 200 MG Cap Take 200 mg by mouth daily with breakfast.    ferrous sulfate 325 (65 FE) MG EC tablet Take 1 tablet (325 mg total) by mouth once daily.     fosinopril (MONOPRIL) 40 MG tablet TAKE 1 TABLET ONE TIME DAILY    furosemide (LASIX) 20 MG tablet TAKE 1 TABLET ONE TIME DAILY    glimepiride (AMARYL) 1 MG tablet Take 1 tablet (1 mg total) by mouth every morning.    isosorbide mononitrate (IMDUR) 120 MG 24 hr tablet Take 1 tablet (120 mg total) by mouth once daily.    metoprolol tartrate (LOPRESSOR) 50 MG tablet Take 1 tablet (50 mg total) by mouth once daily.    nifedipine (PROCARDIA-XL) 30 MG (OSM) 24 hr tablet Take 1 tablet (30 mg total) by mouth once daily.    nitroGLYCERIN (NITROSTAT) 0.4 MG SL tablet Place 1 tablet (0.4 mg total) under the tongue every 5 (five) minutes as needed. As needed for chest pain    omega-3 acid ethyl esters (LOVAZA) 1 gram capsule Take 2 capsules (2 g total) by mouth 2 (two) times daily.    ranitidine (ZANTAC) 300 MG tablet Take 1 tablet (300 mg total) by mouth every evening.    warfarin (COUMADIN) 5 MG tablet Take 1 tablet (5 mg total) by mouth Daily. Current Dose (7.5 mg on Mon, Wed, Fri; 5 mg all other days)     Family History     Problem Relation (Age of Onset)    Alcohol abuse Father    Diabetes Brother    Heart disease Mother, Father, Brother, Sister    Heart failure Mother, Father, Brother    No Known Problems Sister, Maternal Grandmother, Maternal Grandfather, Paternal Grandmother, Paternal Grandfather, Maternal Aunt, Maternal Uncle, Paternal Aunt, Paternal Uncle        Social History Main Topics    Smoking status: Former Smoker     Packs/day: 1.00     Years: 20.00     Quit date: 9/11/1980    Smokeless tobacco: Never Used    Alcohol use No    Drug use: No    Sexual activity: No     Review of Systems   Constitution: Negative for chills and fever.   HENT: Negative for congestion.    Cardiovascular: Positive for dyspnea on exertion (has chronic LÓPEZ and has not worsened). Negative for chest pain, irregular heartbeat, leg swelling, orthopnea, palpitations and syncope.   Respiratory: Positive for shortness of breath  (mild chronic not worsened). Negative for cough.    Gastrointestinal: Positive for diarrhea. Negative for abdominal pain, nausea and vomiting.   Neurological: Negative for dizziness and headaches.     Objective:     Vital Signs (Most Recent):  Temp: 97.9 °F (36.6 °C) (03/20/18 2316)  Pulse: 64 (03/20/18 2322)  Resp: 18 (03/20/18 2316)  BP: (!) 119/53 (03/20/18 2316)  SpO2: 97 % (03/20/18 2316) Vital Signs (24h Range):  Temp:  [97.4 °F (36.3 °C)-98 °F (36.7 °C)] 97.9 °F (36.6 °C)  Pulse:  [58-95] 64  Resp:  [16-20] 18  SpO2:  [92 %-99 %] 97 %  BP: (113-134)/(53-64) 119/53     Weight: 85.3 kg (188 lb 0.8 oz)  Body mass index is 31.29 kg/m².    SpO2: 97 %  O2 Device (Oxygen Therapy): room air      Intake/Output Summary (Last 24 hours) at 03/21/18 0249  Last data filed at 03/21/18 0109   Gross per 24 hour   Intake             1700 ml   Output             1025 ml   Net              675 ml       Lines/Drains/Airways     Peripheral Intravenous Line                 Peripheral IV - Single Lumen 03/19/18 0529 Right Antecubital 1 day                Physical Exam   Constitutional: He is oriented to person, place, and time. He appears well-developed and well-nourished.   HENT:   Head: Normocephalic and atraumatic.   Eyes: EOM are normal. Pupils are equal, round, and reactive to light.   Neck: Normal range of motion. Neck supple. No JVD present.   Cardiovascular: Normal rate, regular rhythm and intact distal pulses.    Murmur (holosystolic mumur heard loudes in the 4th and 5th spaces mid-clavicular line) heard.  Pulmonary/Chest: Effort normal and breath sounds normal. He has no wheezes.   Abdominal: Soft. Bowel sounds are normal. There is no tenderness.   Musculoskeletal: Normal range of motion. He exhibits no edema.   Neurological: He is alert and oriented to person, place, and time. He has normal reflexes.   Vitals reviewed.      Significant Labs:   CMP   Recent Labs  Lab 03/19/18  0530 03/20/18  0558  03/20/18  1546  03/20/18  1851 03/20/18  2348    133*  < > 135* 137 137   K 5.0 6.8*  < > 5.3* 5.1 4.6    109  < > 108 108 109   CO2 14* 15*  < > 13* 17* 18*   GLU 73 72  < > 158* 139* 108   BUN 85* 97*  < > 100* 99* 96*   CREATININE 6.6* 6.0*  < > 5.4* 5.0* 4.4*   CALCIUM 10.2 9.4  < > 9.2 8.9 8.6*   PROT 8.7* 7.5  --   --   --   --    ALBUMIN 4.2 3.8  --   --   --   --    BILITOT 0.6 0.7  --   --   --   --    ALKPHOS 78 73  --   --   --   --    AST 20 18  --   --   --   --    ALT 16 13  --   --   --   --    ANIONGAP 13 9  < > 14 12 10   ESTGFRAFRICA 8.6* 9.6*  < > 11.0* 12.0* 14.0*   EGFRNONAA 7.4* 8.3*  < > 9.5* 10.4* 12.1*   < > = values in this interval not displayed., CBC   Recent Labs  Lab 03/19/18  0530 03/20/18  0558   WBC 9.35 7.79   HGB 13.6* 11.7*   HCT 42.1 35.4*    146*   , Lipid Panel No results for input(s): CHOL, HDL, LDLCALC, TRIG, CHOLHDL in the last 48 hours., Troponin   Recent Labs  Lab 03/20/18  1547 03/20/18  2021 03/20/18  2348   TROPONINI 2.898* 11.245* 13.088*    and All pertinent lab results from the last 24 hours have been reviewed.    Significant Imaging: Echocardiogram:   2D echo with color flow doppler:   Results for orders placed or performed during the hospital encounter of 05/03/17   2D Echo w/ Color Flow Doppler   Result Value Ref Range    EF 65 55 - 65    Mitral Valve Regurgitation MODERATE TO SEVERE (A)     Aortic Valve Regurgitation TRIVIAL     Est. PA Systolic Pressure 53.97 (A)     Mitral Valve Mobility NORMAL     Tricuspid Valve Regurgitation MODERATE (A)     and X-Ray: CXR: X-Ray Chest 1 View (CXR):   Results for orders placed or performed during the hospital encounter of 03/19/18   X-Ray Chest 1 View    Narrative    EXAMINATION:  XR CHEST 1 VIEW    CLINICAL HISTORY:  SOB;    TECHNIQUE:  Single frontal view of the chest was performed.    COMPARISON:  03/22/2003    FINDINGS:  Post-operative changes are again identified in the thorax.  Heart size and pulmonary vascularity  are within normal limits.  Lungs are well expanded and clear.  No pleural fluid or pneumothorax.  Degenerative changes involving the thoracic spine and both AC joints.      Impression    No active cardiopulmonary disease and no detrimental interval change      Electronically signed by: Ayad Baptiste MD  Date:    03/20/2018  Time:    15:41     Assessment and Plan:     NSTEMI (non-ST elevated myocardial infarction)    Patient is a 78 yo male with a past medical hx significant for CAD (s/p CABG in 2002 - LIMA-LAD, SVG-OM2, multiple PCIs), AS (s/p mechanical AVR), HTN, HLD, PAD, T2DM, CKD III here with a NSTEMI superimposed upon his JIM on CKD and hypovolemia secondary to acute diarrhea. Cr is 4.4 and EKG shows no dynamic changes. Not on ACS protocol (not on DAPT) and not heparinized due to supratherapeutic INR. On BB and statin therapy and his antianginal therapy.     2D echo performed at bedside prelim: EF 25-30% with inferolateral wall hypokinesis/Akinesis. Mid and apical anterolateral wall akinesis. RAP<3, Moderate MR and TR     Plan:  - Initiate Aspirin, c/w plavix and hold heparin as the patient is supratherapeutic  - Would BB<70 and treat for goal BP<130/80  - C/w imdur 120 mg daily and lopressor 25 mg BID  - c/w lipitor 80 mg daily.  - defer to nephrology for management of JIM  - Would plan for inpatient LHC once Cr normalizes and metabolic derangements normalize.     Will discuss with staff,    Fito Ramirez MD, PGY-4             VTE Risk Mitigation         Ordered     heparin (porcine) injection 5,000 Units  Every 8 hours     Route:  Subcutaneous        03/20/18 1251          Thank you for your consult. I will follow-up with patient. Please contact us if you have any additional questions.    Fito Ramirez MD  Cardiology   [facility]

## 2018-03-21 NOTE — ASSESSMENT & PLAN NOTE
ACUTE HYPERKALEMIA    Baseline BUN:Cr is 35:1.3; was 85 and 6.6 on admission. Likely prerenal by history. Denies suprapubic pain and nil pain on palpation. Secondary to dehydration resulting from his severe diarrhea. Will treat with fluid replacement. Bladder scan was negative in the emergency room.   - 2L LR bolus and 1 L NS bolus  - UOP improved dramatically overnight  - VBG revealed pH of 7.232  - Strict intake and output  - BUN increased to 86  - Cr decreased to 3.6   - Shifted with 4 units of insulin, albuterol 3g, and excreted with kayexelate   - Nephrology consulted for hyperkalemia   - Prerenal kidney injury is improving with iv fluid hydration

## 2018-03-21 NOTE — PLAN OF CARE
Problem: Patient Care Overview  Goal: Plan of Care Review  Pts troponin trending up all shift. Pt denies any CP and vitals have been stable. STAT EKG performed, no changes from last EKG. 2D echo done at bedside. Pt with a significant increase in urine output (see flowsheet). 24 hour urine collection in process, to end 3/21/18 at 1544. Sodium bicarb gtt infusing at 100 ml/hr. No other issues noted. Will monitor.

## 2018-03-21 NOTE — CONSULTS
Otolaryngology - Head and Neck Surgery  Consultation Report    Consultation From: Hospital medicine     Chief Complaint: nose bleed    History of Present Illness: 77 y.o. male admitted for acute onset diarrhea experiencing epistaxis. He has an extensive PMH including HTN, CKD III, HLD, CAD, DMII, gout, s/p mechanical aortic valve placement, diastolic heart failure, and left-sided colectomy for diverticulosis. This morning his INR was supra-therapeutic at 4. Coumadin, Plavix (w/ loading dose), and Aspirin were recently started. ENT was consulted for epistaxis management.     The patient reported an active nose bleed on the left side. He describes it as a slow drip. He has been holding pressure on it and has not been able to get it to stop. He has a long history of nose bleeds in the past. Dry weather and riding on his boat makes it worse. He has had previous nasal septum cauterization by Dr. Trivedi years ago, but unfortunately has had persistent issues was epistaxis bilaterally. Before this admission, he last nose bleed was about 2 weeks ago, but reports overall this past year hasn't been bad with nose bleeds.     Review of Systems - Negative except for above.     Past Medical History: Patient has a past medical history of Bilateral carotid artery disease (2/9/2017); CAD (coronary atherosclerotic disease) (9/11/2012); Cataract; Chronic kidney disease; DM (diabetes mellitus) (9/26/2012); History of gout (9/26/2012); Hyperlipidemia; Hypertension; Mechanical heart valve present; and NSTEMI (non-ST elevated myocardial infarction) (3/21/2018).    Past Surgical History: Patient has a past surgical history that includes Cardiac valuve replacement; Cardiac catheterization; Coronary artery bypass graft; Coronary angioplasty; Cardiac valve replacement; Spine surgery; Vasectomy; Colon surgery; Colonoscopy (N/A, 3/31/2017); and Hernia repair.    Social History: Patient reports that he quit smoking about 37 years ago. He has a  20.00 pack-year smoking history. He has never used smokeless tobacco. He reports that he does not drink alcohol or use drugs.    Family History: family history includes Alcohol abuse in his father; Diabetes in his brother; Heart disease in his brother, father, mother, and sister; Heart failure in his brother, father, and mother; No Known Problems in his maternal aunt, maternal grandfather, maternal grandmother, maternal uncle, paternal aunt, paternal grandfather, paternal grandmother, paternal uncle, and sister.    Medications:    allopurinol  100 mg Oral Daily    aspirin  81 mg Oral Daily    atorvastatin  80 mg Oral Daily    clopidogrel  75 mg Oral Daily    famotidine  20 mg Oral Daily    ferrous sulfate  325 mg Oral Daily    isosorbide mononitrate  120 mg Oral Daily    magnesium sulfate IVPB  2 g Intravenous Q2H    metoprolol tartrate  25 mg Oral BID    metroNIDAZOLE  500 mg Oral Q12H    NIFEdipine  30 mg Oral Daily    oxymetazoline  1 spray Each Nare BID    sodium bicarbonate  1 tablet Oral Daily    warfarin  5 mg Oral Daily       Allergies: Patient has No Known Allergies.    Physical Exam:  Temp:  [97.4 °F (36.3 °C)-98 °F (36.7 °C)] 97.8 °F (36.6 °C)  Pulse:  [55-72] 61  Resp:  [16-18] 16  SpO2:  [93 %-97 %] 96 %  BP: (118-151)/(53-70) 140/63        Constitutional: Well appearing / communicating.  NAD.  Eyes: EOM I Bilaterally  Head/Face: Normocephalic.  Negative paranasal sinus pressure/tenderness.  Salivary glands WNL.  House Brackmann I Bilaterally.  Right Ear: External Auditory Canal WNL.  Auricle WNL.  Left Ear: External Auditory Canal WNL. Auricle WNL.  Nose: No gross nasal septal deviation. Inferior Turbinates 2+ bilaterally. No septal perforation. No masses/lesions. External nasal skin without masses/lesions. Epistaxis identified on inferior septum midway posteriorly.   Oral Cavity: Gingiva/lips WNL.  FOM Soft, no masses palpated. Oral Tongue mobile. Hard Palate WNL.   Oropharynx: BOT WNL.  No masses/lesions noted. Tonsillar fossa/pharyngeal wall without lesions. Posterior oropharynx WNL.  Soft palate without masses. Midline uvula.   Neck/Lymphatic: No LAD I-VI bilaterally.  No thyromegaly.  No masses noted on exam.  Neuro/Psychiatric: AOx3.  Normal mood and affect.   Cardiovascular: Normal carotid pulses bilaterally, no increasing jugular venous distention noted at cervical region bilaterally.    Respiratory: Normal respiratory effort, no stridor, no retractions noted.    Procedure:   Hampton tip suction was used to clear blood from left nare. Slow ooze was identified on nasal septum - inferior aspect, midway posteriorly. Surgicel was used to pack the nose. Achieved hemostasis for about 30 minutes and then began to re-bleed. Afrin was instilled in the nose. A 8 cm Merocel pack was placed in the nose. Hemostasis was achieved.       CBC    Recent Labs  Lab 03/21/18  1316   WBC 6.23   HGB 11.0*   HCT 32.3*   MCV 89   *     BMP    Recent Labs  Lab 03/21/18  0630  03/21/18  1731     104  < > 131*     140  < > 139   K 4.3  4.4  < > 4.8     110  < > 108   CO2 19*  20*  < > 20*   BUN 93*  92*  < > 84*   CREATININE 3.7*  3.6*  < > 3.0*   CALCIUM 8.9  8.9  < > 9.5   MG 1.0*  --   --    < > = values in this interval not displayed.    Imaging Results          X-Ray Abdomen Flat And Erect (Final result)  Result time 03/19/18 11:54:08    Final result by Raudel Maza III, MD (03/19/18 11:54:08)                 Narrative:    EXAMINATION:  XR ABDOMEN FLAT AND ERECT    CLINICAL HISTORY:  Diarrhea;    TECHNIQUE:  Flat and erect AP views of the abdomen were preformed.    COMPARISON:  None    FINDINGS:  No obstruction, ileus, or perforation seen.  There is postsurgical change.      Electronically signed by: Raudel Maza MD  Date:    03/19/2018  Time:    11:54                               Assessment: 76 y/o male with multiple medical co-morbidities with left-sided epistaxis. He is  currently on aspirin, plavix, and coumadin. Controlled with Merocel nasal pack.     Plan:    - keep nasal pack in place, will re-evaluate in the morning   - anti-staph antibiotics while pack is in place such as Keflex   - telemetry monitoring recommended with pack being posterior in nasal cavity   - Nasal saline to bilateral nares q 4 hrs   - Afrin nasal spray 2 sprays q 4 hrs prn epistaxis x 3 days   - Avoid nasal cannula, humidified 02 only   - ENT will follow and de-escalate pack when appropriate      Francesca Tomas MD  Otolaryngology - Head & Neck Surgery   [phone]

## 2018-03-21 NOTE — SUBJECTIVE & OBJECTIVE
Interval History: Pt is doing very well this morning despite an eventful night. Troponin was elevated to a high of 13 overnight. Cardiology saw patient and recommended initiation of ACS protocol with DAPT. Holding on heparin infusion as INR was supratherapeutic at 4.4 the previous day. Pt denies any current chest pain or shortness of breath.     Review of Systems  Objective:     Vital Signs (Most Recent):  Temp: 97.8 °F (36.6 °C) (03/21/18 1621)  Pulse: 61 (03/21/18 1621)  Resp: 16 (03/21/18 1621)  BP: (!) 140/63 (03/21/18 1621)  SpO2: 96 % (03/21/18 1621) Vital Signs (24h Range):  Temp:  [97.4 °F (36.3 °C)-98 °F (36.7 °C)] 97.8 °F (36.6 °C)  Pulse:  [55-85] 61  Resp:  [16-18] 16  SpO2:  [93 %-97 %] 96 %  BP: (118-151)/(53-70) 140/63     Weight: 85.3 kg (188 lb 0.8 oz)  Body mass index is 31.29 kg/m².    Intake/Output Summary (Last 24 hours) at 03/21/18 1639  Last data filed at 03/21/18 1003   Gross per 24 hour   Intake          2451.67 ml   Output             2250 ml   Net           201.67 ml      Physical Exam   Constitutional: He is oriented to person, place, and time. He appears well-developed and well-nourished.   HENT:   Head: Normocephalic and atraumatic.   Eyes: Conjunctivae and EOM are normal. Pupils are equal, round, and reactive to light.   Neck: Normal range of motion. Neck supple.   Cardiovascular: Normal rate, regular rhythm, normal heart sounds and intact distal pulses.    No murmur heard.  Pulmonary/Chest: Effort normal and breath sounds normal. No respiratory distress. He has no wheezes.   Abdominal: Soft. Bowel sounds are normal. He exhibits no distension and no mass. There is no tenderness. There is no guarding, no CVA tenderness, no tenderness at McBurney's point and negative Dove's sign.   Lymphadenopathy:     He has no cervical adenopathy.   Neurological: He is alert and oriented to person, place, and time. GCS eye subscore is 4. GCS verbal subscore is 5. GCS motor subscore is 6.   Skin: Skin  is warm and dry.   Vitals reviewed.      Significant Labs: All pertinent labs within the past 24 hours have been reviewed.    Significant Imaging: I have reviewed all pertinent imaging results/findings within the past 24 hours.

## 2018-03-21 NOTE — ASSESSMENT & PLAN NOTE
- Known to have CKD stage III, with baseline GFR 30-60 and baseline sCr ~ 1.5-1.7  - Differential Diagnoses include pre-renal etiology given his history of acute diarrhea and decrease oral intake and slight improvement with low hydration.   - Will continue trending urine output and renal function    - Avoid NSAIDs, contrast, nephrotoxins, Monitor strict I/Os, daily weights  - Renally dose medications to current GFR  - Renal ultrasonography ruled out obstruction or post renal etiology  - Contiue improvement in his sCr with good hydration   - Carefully monitor his volume status in the setting of HFpEF and NSTEMI

## 2018-03-21 NOTE — SUBJECTIVE & OBJECTIVE
Interval History: She has complain of chest pain yesterday and had positive troponin and continued to elevate and NSTEMI called and starting medical management deemed improvement in his sCr and possible LHC later on.     Review of patient's allergies indicates:  No Known Allergies  Current Facility-Administered Medications   Medication Frequency    acetaminophen suppository 650 mg Q4H PRN    allopurinol tablet 100 mg Daily    aspirin tablet 325 mg Daily    atorvastatin tablet 80 mg Daily    clopidogrel tablet 75 mg Daily    dextrose 50% injection 12.5 g PRN    dextrose 50% injection 25 g PRN    famotidine tablet 20 mg Daily    ferrous sulfate EC tablet 325 mg Daily    glucagon (human recombinant) injection 1 mg PRN    glucose chewable tablet 16 g PRN    glucose chewable tablet 24 g PRN    insulin aspart U-100 pen 0-5 Units QID (AC + HS) PRN    isosorbide mononitrate 24 hr tablet 120 mg Daily    magnesium oxide tablet 800 mg PRN    magnesium sulfate 2 g in dextrose 5% 50 ml IVPB Q2H    metoprolol tartrate (LOPRESSOR) tablet 25 mg BID    metroNIDAZOLE tablet 500 mg Q12H    NIFEdipine 24 hr tablet 30 mg Daily    nitroGLYCERIN SL tablet 0.4 mg Q5 Min PRN    potassium chloride 10% solution 40 mEq PRN    potassium, sodium phosphates 280-160-250 mg packet 2 packet PRN    ramelteon tablet 8 mg Nightly PRN    sodium bicarbonate tablet 650 mg Daily    sodium chloride 0.9% flush 5 mL PRN    warfarin (COUMADIN) tablet 5 mg Daily       Objective:     Vital Signs (Most Recent):  Temp: 98 °F (36.7 °C) (03/21/18 1119)  Pulse: (!) 59 (03/21/18 1119)  Resp: 16 (03/21/18 1119)  BP: (!) 126/59 (03/21/18 1119)  SpO2: (!) 93 % (03/21/18 1119)  O2 Device (Oxygen Therapy): room air (03/21/18 1119) Vital Signs (24h Range):  Temp:  [97.4 °F (36.3 °C)-98 °F (36.7 °C)] 98 °F (36.7 °C)  Pulse:  [55-85] 59  Resp:  [16-18] 16  SpO2:  [93 %-97 %] 93 %  BP: (118-151)/(53-70) 126/59     Weight: 85.3 kg (188 lb 0.8 oz)  (03/19/18 2303)  Body mass index is 31.29 kg/m².  Body surface area is 1.98 meters squared.    I/O last 3 completed shifts:  In: 2731.7 [P.O.:1160; I.V.:1571.7]  Out: 2125 [Urine:2125]    Physical Exam   Constitutional: He is oriented to person, place, and time. He appears well-developed and well-nourished.   HENT:   Head: Normocephalic.   Recent complain of minor nose bleeding    Eyes: Pupils are equal, round, and reactive to light. Right eye exhibits no discharge. Left eye exhibits no discharge.   Neck: No JVD present.   Cardiovascular: Normal rate and regular rhythm.    No murmur heard.  Pulmonary/Chest: Effort normal. He has no wheezes. He has no rales.   Abdominal: Soft. He exhibits no distension. There is no tenderness.   Musculoskeletal: He exhibits no edema.   Neurological: He is alert and oriented to person, place, and time.   Skin: Skin is warm.   Vitals reviewed.    CBC:   Recent Labs  Lab 03/19/18  0530 03/20/18  0558 03/21/18  0630   WBC 9.35 7.79 5.91   RBC 4.48* 3.90* 3.58*   HGB 13.6* 11.7* 10.9*   HCT 42.1 35.4* 32.0*    146* 134*   MCV 94 91 89   MCH 30.4 30.0 30.4   MCHC 32.3 33.1 34.1     BMP:   Recent Labs  Lab 03/21/18  0630 03/21/18  0749 03/21/18  0750     104 101  101 103     140 138  138 140   K 4.3  4.4 4.5  4.5 4.5     110 107  107 109   CO2 19*  20* 23  23 23   BUN 93*  92* 89*  89* 89*   CREATININE 3.7*  3.6* 3.6*  3.6* 3.6*   CALCIUM 8.9  8.9 9.0  9.0 9.0   MG 1.0*  --   --      Significant Labs:  All labs within the past 24 hours have been reviewed.

## 2018-03-21 NOTE — ASSESSMENT & PLAN NOTE
SEVERE DIARRHEA    Three day history of profuse watery diarrhea with resultant dehydration. Pt's bicarbonate was 14 in the emergency room. Greater than 6 bowel movements per day. Pt's pH is 7.232. Bicarbonate remains low at 16. Anion gap is normal.   - Lactated ringer's to avoid hyperchloremic metabolic acidosis initially but transitioned to NS d/t hyperkalemia. Nil peritonitic signs, nil rebound tenderness or guarding.   - C. Diff EIA negative  - Stool cultures: pending  - KUB flat and erect: nil acute processes such as megacolon  - Nil empiric antibiotic  - Urine sodium, urine potassium and urine chloride for urine anion gap   - Will evaluate for RTA: urine anion gap is elevated. If an RTA then likely type 4 given hyperkalemia    - Most likely related to his severe diarrhea  - Nil episodes of diarrhea this morning

## 2018-03-21 NOTE — NURSING
Received call from telemetry stating that pt had a 6 beat run of vtach. Pt asymptomatic and vitals stable. Repeat troponin elevated. IM 3 aware. Will order EKG. Will monitor.

## 2018-03-21 NOTE — PLAN OF CARE
Problem: Patient Care Overview  Goal: Plan of Care Review  Outcome: Ongoing (interventions implemented as appropriate)  Patient with critical labs this morning, new orders from team completed. Pending 24 hour urine due to low output. Telemetry initiated and NSR. Vitals WNL. No more complaints of chest pain after this morning episode at US. Still complaining of many bowel movements but patient also had Kayexalate this morning. Sent down to US again at 1900. Pending stat BMP. Night team notified of having to redraw due to mistake from lab.

## 2018-03-21 NOTE — SUBJECTIVE & OBJECTIVE
Past Medical History:   Diagnosis Date    Bilateral carotid artery disease 2/9/2017    CAD (coronary atherosclerotic disease) 9/11/2012    Cataract     Chronic kidney disease     DM (diabetes mellitus) 9/26/2012    History of gout 9/26/2012    Hyperlipidemia     Hypertension     Mechanical heart valve present        Past Surgical History:   Procedure Laterality Date    CARDIAC CATHETERIZATION      CARDIAC VALVE REPLACEMENT      CARDIAC VALVE SURGERY      COLON SURGERY      COLONOSCOPY N/A 3/31/2017    Procedure: COLONOSCOPY;  Surgeon: Bruno Raymond MD;  Location: [facility] ([address] [place] [address]);  Service: Endoscopy;  Laterality: N/A;  Patient's wife requesting date.    CORONARY ANGIOPLASTY      CORONARY ARTERY BYPASS GRAFT      HERNIA REPAIR      SPINE SURGERY      VASECTOMY         Review of patient's allergies indicates:  No Known Allergies    No current facility-administered medications on file prior to encounter.      Current Outpatient Prescriptions on File Prior to Encounter   Medication Sig    allopurinol (ZYLOPRIM) 100 MG tablet TAKE 1 TABLET ONE TIME DAILY    atorvastatin (LIPITOR) 80 MG tablet TAKE 1 TABLET ONE TIME DAILY    clotrimazole-betamethasone 1-0.05% (LOTRISONE) cream Apply topically once daily. in between the 4th and 5th toes left foot.    fenofibrate micronized (LOFIBRA) 200 MG Cap Take 200 mg by mouth daily with breakfast.    ferrous sulfate 325 (65 FE) MG EC tablet Take 1 tablet (325 mg total) by mouth once daily.    fosinopril (MONOPRIL) 40 MG tablet TAKE 1 TABLET ONE TIME DAILY    furosemide (LASIX) 20 MG tablet TAKE 1 TABLET ONE TIME DAILY    glimepiride (AMARYL) 1 MG tablet Take 1 tablet (1 mg total) by mouth every morning.    isosorbide mononitrate (IMDUR) 120 MG 24 hr tablet Take 1 tablet (120 mg total) by mouth once daily.    metoprolol tartrate (LOPRESSOR) 50 MG tablet Take 1 tablet (50 mg total) by mouth once daily.    nifedipine (PROCARDIA-XL) 30 MG (OSM) 24 hr  tablet Take 1 tablet (30 mg total) by mouth once daily.    nitroGLYCERIN (NITROSTAT) 0.4 MG SL tablet Place 1 tablet (0.4 mg total) under the tongue every 5 (five) minutes as needed. As needed for chest pain    omega-3 acid ethyl esters (LOVAZA) 1 gram capsule Take 2 capsules (2 g total) by mouth 2 (two) times daily.    ranitidine (ZANTAC) 300 MG tablet Take 1 tablet (300 mg total) by mouth every evening.    warfarin (COUMADIN) 5 MG tablet Take 1 tablet (5 mg total) by mouth Daily. Current Dose (7.5 mg on Mon, Wed, Fri; 5 mg all other days)     Family History     Problem Relation (Age of Onset)    Alcohol abuse Father    Diabetes Brother    Heart disease Mother, Father, Brother, Sister    Heart failure Mother, Father, Brother    No Known Problems Sister, Maternal Grandmother, Maternal Grandfather, Paternal Grandmother, Paternal Grandfather, Maternal Aunt, Maternal Uncle, Paternal Aunt, Paternal Uncle        Social History Main Topics    Smoking status: Former Smoker     Packs/day: 1.00     Years: 20.00     Quit date: 9/11/1980    Smokeless tobacco: Never Used    Alcohol use No    Drug use: No    Sexual activity: No     Review of Systems   Constitution: Negative for chills and fever.   HENT: Negative for congestion.    Cardiovascular: Positive for dyspnea on exertion (has chronic LÓPEZ and has not worsened). Negative for chest pain, irregular heartbeat, leg swelling, orthopnea, palpitations and syncope.   Respiratory: Positive for shortness of breath (mild chronic not worsened). Negative for cough.    Gastrointestinal: Positive for diarrhea. Negative for abdominal pain, nausea and vomiting.   Neurological: Negative for dizziness and headaches.     Objective:     Vital Signs (Most Recent):  Temp: 97.9 °F (36.6 °C) (03/20/18 2316)  Pulse: 64 (03/20/18 2322)  Resp: 18 (03/20/18 2316)  BP: (!) 119/53 (03/20/18 2316)  SpO2: 97 % (03/20/18 2316) Vital Signs (24h Range):  Temp:  [97.4 °F (36.3 °C)-98 °F (36.7 °C)]  97.9 °F (36.6 °C)  Pulse:  [58-95] 64  Resp:  [16-20] 18  SpO2:  [92 %-99 %] 97 %  BP: (113-134)/(53-64) 119/53     Weight: 85.3 kg (188 lb 0.8 oz)  Body mass index is 31.29 kg/m².    SpO2: 97 %  O2 Device (Oxygen Therapy): room air      Intake/Output Summary (Last 24 hours) at 03/21/18 0249  Last data filed at 03/21/18 0109   Gross per 24 hour   Intake             1700 ml   Output             1025 ml   Net              675 ml       Lines/Drains/Airways     Peripheral Intravenous Line                 Peripheral IV - Single Lumen 03/19/18 0529 Right Antecubital 1 day                Physical Exam   Constitutional: He is oriented to person, place, and time. He appears well-developed and well-nourished.   HENT:   Head: Normocephalic and atraumatic.   Eyes: EOM are normal. Pupils are equal, round, and reactive to light.   Neck: Normal range of motion. Neck supple. No JVD present.   Cardiovascular: Normal rate, regular rhythm and intact distal pulses.    Murmur (holosystolic mumur heard loudes in the 4th and 5th spaces mid-clavicular line) heard.  Pulmonary/Chest: Effort normal and breath sounds normal. He has no wheezes.   Abdominal: Soft. Bowel sounds are normal. There is no tenderness.   Musculoskeletal: Normal range of motion. He exhibits no edema.   Neurological: He is alert and oriented to person, place, and time. He has normal reflexes.   Vitals reviewed.      Significant Labs:   CMP   Recent Labs  Lab 03/19/18  0530 03/20/18  0558  03/20/18  1547 03/20/18  1851 03/20/18  2348    133*  < > 135* 137 137   K 5.0 6.8*  < > 5.3* 5.1 4.6    109  < > 108 108 109   CO2 14* 15*  < > 13* 17* 18*   GLU 73 72  < > 158* 139* 108   BUN 85* 97*  < > 100* 99* 96*   CREATININE 6.6* 6.0*  < > 5.4* 5.0* 4.4*   CALCIUM 10.2 9.4  < > 9.2 8.9 8.6*   PROT 8.7* 7.5  --   --   --   --    ALBUMIN 4.2 3.8  --   --   --   --    BILITOT 0.6 0.7  --   --   --   --    ALKPHOS 78 73  --   --   --   --    AST 20 18  --   --   --   --     ALT 16 13  --   --   --   --    ANIONGAP 13 9  < > 14 12 10   ESTGFRAFRICA 8.6* 9.6*  < > 11.0* 12.0* 14.0*   EGFRNONAA 7.4* 8.3*  < > 9.5* 10.4* 12.1*   < > = values in this interval not displayed., CBC   Recent Labs  Lab 03/19/18  0530 03/20/18  0558   WBC 9.35 7.79   HGB 13.6* 11.7*   HCT 42.1 35.4*    146*   , Lipid Panel No results for input(s): CHOL, HDL, LDLCALC, TRIG, CHOLHDL in the last 48 hours., Troponin   Recent Labs  Lab 03/20/18  1547 03/20/18  2021 03/20/18  2348   TROPONINI 2.898* 11.245* 13.088*    and All pertinent lab results from the last 24 hours have been reviewed.    Significant Imaging: Echocardiogram:   2D echo with color flow doppler:   Results for orders placed or performed during the hospital encounter of 05/03/17   2D Echo w/ Color Flow Doppler   Result Value Ref Range    EF 65 55 - 65    Mitral Valve Regurgitation MODERATE TO SEVERE (A)     Aortic Valve Regurgitation TRIVIAL     Est. PA Systolic Pressure 53.97 (A)     Mitral Valve Mobility NORMAL     Tricuspid Valve Regurgitation MODERATE (A)     and X-Ray: CXR: X-Ray Chest 1 View (CXR):   Results for orders placed or performed during the hospital encounter of 03/19/18   X-Ray Chest 1 View    Narrative    EXAMINATION:  XR CHEST 1 VIEW    CLINICAL HISTORY:  SOB;    TECHNIQUE:  Single frontal view of the chest was performed.    COMPARISON:  03/22/2003    FINDINGS:  Post-operative changes are again identified in the thorax.  Heart size and pulmonary vascularity are within normal limits.  Lungs are well expanded and clear.  No pleural fluid or pneumothorax.  Degenerative changes involving the thoracic spine and both AC joints.      Impression    No active cardiopulmonary disease and no detrimental interval change      Electronically signed by: Ayad Baptiste MD  Date:    03/20/2018  Time:    15:41

## 2018-03-21 NOTE — CARE UPDATE
Pt with epistaxis with failed anterior pressure and packing. Requires rhino rocket placement by ENT. Decreased aspirin dose from 325 mg to 81 mg d/t concern for hemorrhage.     Behram Khan, MD  Internal Medicine, PGY-1  #[phone]

## 2018-03-21 NOTE — ASSESSMENT & PLAN NOTE
Patient is a 78 yo male with a past medical hx significant for CAD (s/p CABG in 2002 - LIMA-LAD, SVG-OM2, multiple PCIs), AS (s/p mechanical AVR), HTN, HLD, PAD, T2DM, CKD III here with a NSTEMI superimposed upon his JIM on CKD and hypovolemia secondary to acute diarrhea. Cr is 4.4 and EKG shows no dynamic changes. Not on ACS protocol (not on DAPT) and not heparinized due to supratherapeutic INR. On BB and statin therapy and his antianginal therapy.     2D echo performed at bedside prelim: EF 25-30% with inferolateral wall hypokinesis/Akinesis. Mid and apical anterolateral wall akinesis. RAP<3, Moderate MR and TR     Plan:  - Initiate Aspirin, c/w plavix and hold heparin as the patient is supratherapeutic  - Would BB<70 and treat for goal BP<130/80  - C/w imdur 120 mg daily and lopressor 25 mg BID  - c/w lipitor 80 mg daily.  - defer to nephrology for management of JIM  - Would plan for inpatient LHC once Cr normalizes and metabolic derangements normalize.     Will discuss with staff,    Fito Ramirez MD, PGY-4

## 2018-03-21 NOTE — PROGRESS NOTES
[facility] Hospital Medicine  Progress Note    Patient Name: Dariel Urbina  MRN: [id number]  Patient Class: IP- Inpatient   Admission Date: 3/19/2018  Length of Stay: 2 days  Attending Physician: Lamberto Miranda MD  Primary Care Provider: Devon Langston MD    [facility] Medicine Team: [facility] HOSP MED 3 Behram Khan, MD    Subjective:     Principal Problem:Acute renal failure superimposed on stage 3 chronic kidney disease    HPI:  Mr Urbina is a 78 yo male with PMH significant for HTN, CKD III, HLD, CAD, DMT2, gout, mechanical aortic valve, diastolic heart failure, and history of left-sided colectomy for diverticulosis who presents with diarrhea of three day duration with sudden onset. Diarrhea is watery nigh on clear without any mucus or blood. Pt c/o greater than 6 episodes of diarrhea per day for the past three days. Associated symptoms include weakness, fatigue and decreased urine output. He has not urinated for the past two days. He denies any abdominal pain or cramps currently. Pt has tried immodium, kaopectate and pepto bismal without any symptomatic benefit. He has never had an episode of diarrhea like this before. Pt cannot attribute this episode of diarrhea to any particular cause.     He denies any recent changes in diet or recent travel. Denies sick contacts, fevers, chills, headache, or confusion. Denies recent antibiotics or hospitalizations. Denies changes to medications. Review of systems was positive for shortness of breath, cough while eating, and decreased urine output. Pt also endorses a decreased appetite over the past two months resulting in an unintentional twenty pound weight loss.     Hospital Course:  No notes on file    Interval History: Pt is doing very well this morning despite an eventful night. Troponin was elevated to a high of 13 overnight. Cardiology saw patient and recommended initiation of ACS protocol with DAPT. Holding on heparin infusion as INR was  supratherapeutic at 4.4 the previous day. Pt denies any current chest pain or shortness of breath.     Review of Systems  Objective:     Vital Signs (Most Recent):  Temp: 97.8 °F (36.6 °C) (03/21/18 1621)  Pulse: 61 (03/21/18 1621)  Resp: 16 (03/21/18 1621)  BP: (!) 140/63 (03/21/18 1621)  SpO2: 96 % (03/21/18 1621) Vital Signs (24h Range):  Temp:  [97.4 °F (36.3 °C)-98 °F (36.7 °C)] 97.8 °F (36.6 °C)  Pulse:  [55-85] 61  Resp:  [16-18] 16  SpO2:  [93 %-97 %] 96 %  BP: (118-151)/(53-70) 140/63     Weight: 85.3 kg (188 lb 0.8 oz)  Body mass index is 31.29 kg/m².    Intake/Output Summary (Last 24 hours) at 03/21/18 1639  Last data filed at 03/21/18 1003   Gross per 24 hour   Intake          2451.67 ml   Output             2250 ml   Net           201.67 ml      Physical Exam   Constitutional: He is oriented to person, place, and time. He appears well-developed and well-nourished.   HENT:   Head: Normocephalic and atraumatic.   Eyes: Conjunctivae and EOM are normal. Pupils are equal, round, and reactive to light.   Neck: Normal range of motion. Neck supple.   Cardiovascular: Normal rate, regular rhythm, normal heart sounds and intact distal pulses.    No murmur heard.  Pulmonary/Chest: Effort normal and breath sounds normal. No respiratory distress. He has no wheezes.   Abdominal: Soft. Bowel sounds are normal. He exhibits no distension and no mass. There is no tenderness. There is no guarding, no CVA tenderness, no tenderness at McBurney's point and negative Dove's sign.   Lymphadenopathy:     He has no cervical adenopathy.   Neurological: He is alert and oriented to person, place, and time. GCS eye subscore is 4. GCS verbal subscore is 5. GCS motor subscore is 6.   Skin: Skin is warm and dry.   Vitals reviewed.      Significant Labs: All pertinent labs within the past 24 hours have been reviewed.    Significant Imaging: I have reviewed all pertinent imaging results/findings within the past 24  hours.    Assessment/Plan:      * Acute renal failure superimposed on stage 3 chronic kidney disease    ACUTE HYPERKALEMIA    Baseline BUN:Cr is 35:1.3; was 85 and 6.6 on admission. Likely prerenal by history. Denies suprapubic pain and nil pain on palpation. Secondary to dehydration resulting from his severe diarrhea. Will treat with fluid replacement. Bladder scan was negative in the emergency room.   - 2L LR bolus and 1 L NS bolus  - UOP improved dramatically overnight  - VBG revealed pH of 7.232  - Strict intake and output  - BUN increased to 86  - Cr decreased to 3.6   - Shifted with 4 units of insulin, albuterol 3g, and excreted with kayexelate   - Nephrology consulted for hyperkalemia   - Prerenal kidney injury is improving with iv fluid hydration        NSTEMI (non-ST elevated myocardial infarction)    Bedside preliminary echocardiogram performed by cards fellow: EF 25-30% with inferolateral wall hypokinesis/akinesis. Mid and apical anterolateral wall akinesis. RAP<3, Moderate MR and TR. Official read revealed decrease in EF to 50-55% and hypokinesis in the following areas: mid anteroseptum, apical septum.  - Aspirin, 81 mg PO   - Plavix load initiated   - BB<70 and treat for goal BP<130/80.   - C/w imdur 120 mg daily and lopressor 25 mg BID  - c/w lipitor 80 mg daily.  - Planned for inpatient LHC once Cr normalizes and metabolic derangements normalize            Metabolic acidosis with normal anion gap and bicarbonate losses    SEVERE DIARRHEA    Three day history of profuse watery diarrhea with resultant dehydration. Pt's bicarbonate was 14 in the emergency room. Greater than 6 bowel movements per day. Pt's pH is 7.232. Bicarbonate remains low at 16. Anion gap is normal.   - Lactated ringer's to avoid hyperchloremic metabolic acidosis initially but transitioned to NS d/t hyperkalemia. Nil peritonitic signs, nil rebound tenderness or guarding.   - C. Diff EIA negative  - Stool cultures: pending  - KUB flat  and erect: nil acute processes such as megacolon  - Nil empiric antibiotic  - Urine sodium, urine potassium and urine chloride for urine anion gap   - Will evaluate for RTA: urine anion gap is elevated. If an RTA then likely type 4 given hyperkalemia    - Most likely related to his severe diarrhea  - Nil episodes of diarrhea this morning          CAD (coronary atherosclerotic disease)    PERIPHERAL VASCULAR DISEASE    Takes high-intensity statin. Also has nitroglycerin 0.4 mg PRN. Denies every requiring nitro. Nil aspirin in the outpatient setting.   - Aspirin 81 mg  - Plavix 300 then 75 mg  - Atorvastatin 80 mg QD  - Isosorbide mononitrate 120 mg qd              H/O mechanical aortic valve replacement    LONG TERM CURRENT USE OF ANTICOAGULATION THERAPY    Pt on anticoagulation at home with daily warfarin. INR was 4.4 on admission. Has decreased PO intake for the past two months. INR 2.2 today. Will re-initiate warfarin.    - Warfarin 5 mg qd        Renovascular hypertension    Home regimen includes fosinopril 40 mg qd, nifedipine 30 mg qd.  - Will hold fosinopril in setting of JIM  - Continue nifedipine.             History of gout    Allopurinol 100 mg qd          Left ventricular diastolic dysfunction with preserved systolic function    ACE-inhibitor and furosemide at home. Will hold in setting of prerenal JIM.             Type 2 diabetes mellitus with diabetic peripheral angiopathy without gangrene    A1c was 5.5. On glimiperide at home.   - SSI           Hyperlipidemia    On fenofibrate 200 mg at home and atorvastatin 80 mg qd  - Will continue statin          CKD (chronic kidney disease), stage III    HYPERPARATHYROIDISM DUE TO RENAL INSUFFICIENCY  ANEMIA OF CHRONIC RENAL FAILURE, STAGE 3    GFR is usually in the 50's in the setting of high blood pressure. Corrected calcium was 10.0 mg/dL.  - Will continue to monitor and treat his acute renal failure.   - Continue ferrous sulfate, 325 mg qd (per outpatient  regimen)          Gastroesophageal reflux disease without esophagitis    On H2-inhibitor, famotidine at home.   - Will hold in setting of JIM          Obesity, diabetes, and hypertension syndrome    Pt reports 20 lbs of unintentional weight loss d/t decreased appetite   - Cardiac diet          Severe diarrhea                Hyperparathyroidism due to renal insufficiency              Anemia of chronic renal failure, stage 3 (moderate)              Long term current use of anticoagulant therapy              PVD (peripheral vascular disease)                VTE Risk Mitigation         Ordered     warfarin (COUMADIN) tablet 5 mg  Daily     Route:  Oral        03/21/18 0914        I have reviewed and discussed this patient with staff attending Dr Miranda of hospital medicine.      Behram Khan, MD  Department of Hospital Medicine   [facility]

## 2018-03-21 NOTE — ASSESSMENT & PLAN NOTE
Bedside preliminary echocardiogram performed by cards fellow: EF 25-30% with inferolateral wall hypokinesis/akinesis. Mid and apical anterolateral wall akinesis. RAP<3, Moderate MR and TR. Official read revealed decrease in EF to 50-55% and hypokinesis in the following areas: mid anteroseptum, apical septum.  - Aspirin, 81 mg PO   - Plavix load initiated   - BB<70 and treat for goal BP<130/80.   - C/w imdur 120 mg daily and lopressor 25 mg BID  - c/w lipitor 80 mg daily.  - Planned for inpatient LHC once Cr normalizes and metabolic derangements normalize

## 2018-03-21 NOTE — PLAN OF CARE
Problem: Patient Care Overview  Goal: Plan of Care Review  Outcome: Ongoing (interventions implemented as appropriate)  Patient had no new complaints of chest pain or discomfort today. Continues to urinate well, 24 hour urine test finished and sent in. No episodes of diarrhea, had one formed bowel movement. Sinus bradycardia on monitor, all other vitals WNL. New episode of nosebleed in afternoon, stabilized by ENT consult. Magnesium replaced. Continuing to monitor for any changes.

## 2018-03-21 NOTE — ASSESSMENT & PLAN NOTE
LONG TERM CURRENT USE OF ANTICOAGULATION THERAPY    Pt on anticoagulation at home with daily warfarin. INR was 4.4 on admission. Has decreased PO intake for the past two months. INR 2.2 today. Will re-initiate warfarin.    - Warfarin 5 mg qd

## 2018-03-21 NOTE — ASSESSMENT & PLAN NOTE
PERIPHERAL VASCULAR DISEASE    Takes high-intensity statin. Also has nitroglycerin 0.4 mg PRN. Denies every requiring nitro. Nil aspirin in the outpatient setting.   - Aspirin 81 mg  - Plavix 300 then 75 mg  - Atorvastatin 80 mg QD  - Isosorbide mononitrate 120 mg qd

## 2018-03-21 NOTE — PROGRESS NOTES
[facility]  Nephrology  Progress Note    Patient Name: Dariel Urbina  MRN: [id number]  Admission Date: 3/19/2018  Hospital Length of Stay: 2 days  Attending Provider: Lamberto Miranda MD   Primary Care Physician: Devon Langston MD  Principal Problem:Acute renal failure superimposed on stage 3 chronic kidney disease    Subjective:     HPI: This is Mr. Dariel Urbina, 77 year old male with PMHx significant for PVD, CAD, mechanical aortic valve, on long term current use of anticoagulant therapy, HFpEF,  Hyperparathyroidism, GERD and HTN. He presented on 3/19/2018 with history of high output watery diarrhea for the last three days associated with weakness, fatigue and decrease oral intake, tried diarrhea medication with no success. No fever, sick contacts, recent exposure to antibiotics or recent hospitalization for other reasons. Nephrology consulted for Hyperkalemia in the setting of JIM.     Interval History: She has complain of chest pain yesterday and had positive troponin and continued to elevate and NSTEMI called and starting medical management deemed improvement in his sCr and possible LHC later on.     Review of patient's allergies indicates:  No Known Allergies  Current Facility-Administered Medications   Medication Frequency    acetaminophen suppository 650 mg Q4H PRN    allopurinol tablet 100 mg Daily    aspirin tablet 325 mg Daily    atorvastatin tablet 80 mg Daily    clopidogrel tablet 75 mg Daily    dextrose 50% injection 12.5 g PRN    dextrose 50% injection 25 g PRN    famotidine tablet 20 mg Daily    ferrous sulfate EC tablet 325 mg Daily    glucagon (human recombinant) injection 1 mg PRN    glucose chewable tablet 16 g PRN    glucose chewable tablet 24 g PRN    insulin aspart U-100 pen 0-5 Units QID (AC + HS) PRN    isosorbide mononitrate 24 hr tablet 120 mg Daily    magnesium oxide tablet 800 mg PRN    magnesium sulfate 2 g in dextrose 5% 50 ml IVPB Q2H     metoprolol tartrate (LOPRESSOR) tablet 25 mg BID    metroNIDAZOLE tablet 500 mg Q12H    NIFEdipine 24 hr tablet 30 mg Daily    nitroGLYCERIN SL tablet 0.4 mg Q5 Min PRN    potassium chloride 10% solution 40 mEq PRN    potassium, sodium phosphates 280-160-250 mg packet 2 packet PRN    ramelteon tablet 8 mg Nightly PRN    sodium bicarbonate tablet 650 mg Daily    sodium chloride 0.9% flush 5 mL PRN    warfarin (COUMADIN) tablet 5 mg Daily       Objective:     Vital Signs (Most Recent):  Temp: 98 °F (36.7 °C) (03/21/18 1119)  Pulse: (!) 59 (03/21/18 1119)  Resp: 16 (03/21/18 1119)  BP: (!) 126/59 (03/21/18 1119)  SpO2: (!) 93 % (03/21/18 1119)  O2 Device (Oxygen Therapy): room air (03/21/18 1119) Vital Signs (24h Range):  Temp:  [97.4 °F (36.3 °C)-98 °F (36.7 °C)] 98 °F (36.7 °C)  Pulse:  [55-85] 59  Resp:  [16-18] 16  SpO2:  [93 %-97 %] 93 %  BP: (118-151)/(53-70) 126/59     Weight: 85.3 kg (188 lb 0.8 oz) (03/19/18 2303)  Body mass index is 31.29 kg/m².  Body surface area is 1.98 meters squared.    I/O last 3 completed shifts:  In: 2731.7 [P.O.:1160; I.V.:1571.7]  Out: 2125 [Urine:2125]    Physical Exam   Constitutional: He is oriented to person, place, and time. He appears well-developed and well-nourished.   HENT:   Head: Normocephalic.   Recent complain of minor nose bleeding    Eyes: Pupils are equal, round, and reactive to light. Right eye exhibits no discharge. Left eye exhibits no discharge.   Neck: No JVD present.   Cardiovascular: Normal rate and regular rhythm.    No murmur heard.  Pulmonary/Chest: Effort normal. He has no wheezes. He has no rales.   Abdominal: Soft. He exhibits no distension. There is no tenderness.   Musculoskeletal: He exhibits no edema.   Neurological: He is alert and oriented to person, place, and time.   Skin: Skin is warm.   Vitals reviewed.    CBC:   Recent Labs  Lab 03/19/18  0530 03/20/18  0558 03/21/18  0630   WBC 9.35 7.79 5.91   RBC 4.48* 3.90* 3.58*   HGB 13.6* 11.7*  10.9*   HCT 42.1 35.4* 32.0*    146* 134*   MCV 94 91 89   MCH 30.4 30.0 30.4   MCHC 32.3 33.1 34.1     BMP:   Recent Labs  Lab 03/21/18  0630 03/21/18  0749 03/21/18  0750     104 101  101 103     140 138  138 140   K 4.3  4.4 4.5  4.5 4.5     110 107  107 109   CO2 19*  20* 23  23 23   BUN 93*  92* 89*  89* 89*   CREATININE 3.7*  3.6* 3.6*  3.6* 3.6*   CALCIUM 8.9  8.9 9.0  9.0 9.0   MG 1.0*  --   --      Significant Labs:  All labs within the past 24 hours have been reviewed.       Assessment/Plan:     * Acute renal failure superimposed on stage 3 chronic kidney disease    - Known to have CKD stage III, with baseline GFR 30-60 and baseline sCr ~ 1.5-1.7  - Differential Diagnoses include pre-renal etiology given his history of acute diarrhea and decrease oral intake and slight improvement with low hydration.   - Will continue trending urine output and renal function    - Avoid NSAIDs, contrast, nephrotoxins, Monitor strict I/Os, daily weights  - Renally dose medications to current GFR  - Renal ultrasonography ruled out obstruction or post renal etiology  - Contiue improvement in his sCr with good hydration   - Carefully monitor his volume status in the setting of HFpEF and NSTEMI         Metabolic acidosis with normal anion gap and bicarbonate losses    - Most likely etiology is Diarrhea and bicarb loss  - Agreed to switch his NaHCO3 to NaCl, however be careful with volume status in the setting of recent NSTEMI and follow 2d Echo        Thank you for your consult. Staff attestation to follow. Please contact us if you have any additional questions.    Dustin Ramsay MD  Nephrology  [facility]    ATTENDING PHYSICIAN ATTESTATION  I have personally interviewed and examined the patient. I thoroughly reviewed the demographic, clinical, laboratorial and imaging information available in medical records. I agree with the assessment and recommendations provided  by the subspecialty resident. Dr. Ramsay was under my supervision.

## 2018-03-22 PROBLEM — K52.9 SEVERE DIARRHEA: Status: RESOLVED | Noted: 2017-03-31 | Resolved: 2018-03-22

## 2018-03-22 LAB
ANION GAP SERPL CALC-SCNC: 8 MMOL/L
ANISOCYTOSIS BLD QL SMEAR: SLIGHT
BACTERIA STL CULT: NORMAL
BASOPHILS # BLD AUTO: 0.02 K/UL
BASOPHILS NFR BLD: 0.3 %
BUN SERPL-MCNC: 75 MG/DL
BURR CELLS BLD QL SMEAR: ABNORMAL
CALCIUM SERPL-MCNC: 9.8 MG/DL
CHLORIDE SERPL-SCNC: 110 MMOL/L
CO2 SERPL-SCNC: 23 MMOL/L
CREAT SERPL-MCNC: 2.3 MG/DL
DIFFERENTIAL METHOD: ABNORMAL
EOSINOPHIL # BLD AUTO: 0.1 K/UL
EOSINOPHIL NFR BLD: 2.1 %
ERYTHROCYTE [DISTWIDTH] IN BLOOD BY AUTOMATED COUNT: 13.6 %
EST. GFR  (AFRICAN AMERICAN): 30.7 ML/MIN/1.73 M^2
EST. GFR  (NON AFRICAN AMERICAN): 26.6 ML/MIN/1.73 M^2
GLUCOSE SERPL-MCNC: 95 MG/DL
HCT VFR BLD AUTO: 33 %
HGB BLD-MCNC: 11.2 G/DL
IMM GRANULOCYTES # BLD AUTO: 0.01 K/UL
IMM GRANULOCYTES NFR BLD AUTO: 0.2 %
INR PPP: 1.8
INTERPRETATION SERPL IFE-IMP: NORMAL
LYMPHOCYTES # BLD AUTO: 1.8 K/UL
LYMPHOCYTES NFR BLD: 30.8 %
MAGNESIUM SERPL-MCNC: 1.7 MG/DL
MCH RBC QN AUTO: 30.5 PG
MCHC RBC AUTO-ENTMCNC: 33.9 G/DL
MCV RBC AUTO: 90 FL
MONOCYTES # BLD AUTO: 0.6 K/UL
MONOCYTES NFR BLD: 11.1 %
NEUTROPHILS # BLD AUTO: 3.2 K/UL
NEUTROPHILS NFR BLD: 55.5 %
NRBC BLD-RTO: 0 /100 WBC
PATHOLOGIST INTERPRETATION IFE: NORMAL
PATHOLOGIST INTERPRETATION UPE: NORMAL
PHOSPHATE SERPL-MCNC: 2.6 MG/DL
PLATELET # BLD AUTO: 138 K/UL
PMV BLD AUTO: 11 FL
POIKILOCYTOSIS BLD QL SMEAR: SLIGHT
POTASSIUM SERPL-SCNC: 4.8 MMOL/L
PROT PATTERN UR ELPH-IMP: NORMAL
PROTHROMBIN TIME: 17.7 SEC
RBC # BLD AUTO: 3.67 M/UL
SCHISTOCYTES BLD QL SMEAR: ABNORMAL
SODIUM SERPL-SCNC: 141 MMOL/L
TROPONIN I SERPL DL<=0.01 NG/ML-MCNC: 4.89 NG/ML
WBC # BLD AUTO: 5.74 K/UL

## 2018-03-22 PROCEDURE — 80048 BASIC METABOLIC PNL TOTAL CA: CPT

## 2018-03-22 PROCEDURE — 36415 COLL VENOUS BLD VENIPUNCTURE: CPT

## 2018-03-22 PROCEDURE — 83735 ASSAY OF MAGNESIUM: CPT

## 2018-03-22 PROCEDURE — 99233 SBSQ HOSP IP/OBS HIGH 50: CPT | Mod: GC,,, | Performed by: INTERNAL MEDICINE

## 2018-03-22 PROCEDURE — 85025 COMPLETE CBC W/AUTO DIFF WBC: CPT

## 2018-03-22 PROCEDURE — 99232 SBSQ HOSP IP/OBS MODERATE 35: CPT | Mod: ,,, | Performed by: INTERNAL MEDICINE

## 2018-03-22 PROCEDURE — 25000003 PHARM REV CODE 250: Performed by: STUDENT IN AN ORGANIZED HEALTH CARE EDUCATION/TRAINING PROGRAM

## 2018-03-22 PROCEDURE — 84100 ASSAY OF PHOSPHORUS: CPT

## 2018-03-22 PROCEDURE — 30905 CONTROL OF NOSEBLEED: CPT | Mod: LT,GC,, | Performed by: OTOLARYNGOLOGY

## 2018-03-22 PROCEDURE — 99232 SBSQ HOSP IP/OBS MODERATE 35: CPT | Mod: GC,,, | Performed by: INTERNAL MEDICINE

## 2018-03-22 PROCEDURE — 20600001 HC STEP DOWN PRIVATE ROOM

## 2018-03-22 PROCEDURE — 99223 1ST HOSP IP/OBS HIGH 75: CPT | Mod: 25,GC,, | Performed by: OTOLARYNGOLOGY

## 2018-03-22 PROCEDURE — 85610 PROTHROMBIN TIME: CPT

## 2018-03-22 RX ORDER — WARFARIN 7.5 MG/1
7.5 TABLET ORAL
Status: DISCONTINUED | OUTPATIENT
Start: 2018-03-24 | End: 2018-03-25

## 2018-03-22 RX ORDER — CEPHALEXIN 500 MG/1
500 CAPSULE ORAL EVERY 12 HOURS
Status: COMPLETED | OUTPATIENT
Start: 2018-03-22 | End: 2018-03-26

## 2018-03-22 RX ORDER — NIFEDIPINE 60 MG/1
60 TABLET, EXTENDED RELEASE ORAL DAILY
Status: DISCONTINUED | OUTPATIENT
Start: 2018-03-23 | End: 2018-03-25

## 2018-03-22 RX ORDER — WARFARIN 2.5 MG/1
5 TABLET ORAL
Status: DISCONTINUED | OUTPATIENT
Start: 2018-03-25 | End: 2018-03-25

## 2018-03-22 RX ORDER — NIFEDIPINE 30 MG/1
30 TABLET, EXTENDED RELEASE ORAL ONCE
Status: COMPLETED | OUTPATIENT
Start: 2018-03-22 | End: 2018-03-22

## 2018-03-22 RX ORDER — CEPHALEXIN 250 MG/1
250 CAPSULE ORAL DAILY
Status: DISCONTINUED | OUTPATIENT
Start: 2018-03-22 | End: 2018-03-22

## 2018-03-22 RX ORDER — WARFARIN 2.5 MG/1
5 TABLET ORAL
Status: DISCONTINUED | OUTPATIENT
Start: 2018-03-23 | End: 2018-03-25

## 2018-03-22 RX ADMIN — METRONIDAZOLE 500 MG: 500 TABLET ORAL at 09:03

## 2018-03-22 RX ADMIN — ISOSORBIDE MONONITRATE 120 MG: 60 TABLET, EXTENDED RELEASE ORAL at 09:03

## 2018-03-22 RX ADMIN — METOPROLOL TARTRATE 25 MG: 25 TABLET ORAL at 09:03

## 2018-03-22 RX ADMIN — ALLOPURINOL 100 MG: 100 TABLET ORAL at 09:03

## 2018-03-22 RX ADMIN — OXYMETAZOLINE HYDROCHLORIDE 1 SPRAY: 5 SPRAY NASAL at 09:03

## 2018-03-22 RX ADMIN — ATORVASTATIN CALCIUM 80 MG: 20 TABLET, FILM COATED ORAL at 09:03

## 2018-03-22 RX ADMIN — CEPHALEXIN 250 MG: 250 CAPSULE ORAL at 12:03

## 2018-03-22 RX ADMIN — CEPHALEXIN 500 MG: 500 CAPSULE ORAL at 06:03

## 2018-03-22 RX ADMIN — FERROUS SULFATE TAB EC 325 MG (65 MG FE EQUIVALENT) 325 MG: 325 (65 FE) TABLET DELAYED RESPONSE at 09:03

## 2018-03-22 RX ADMIN — CEPHALEXIN 250 MG: 250 CAPSULE ORAL at 09:03

## 2018-03-22 RX ADMIN — CLOPIDOGREL BISULFATE 75 MG: 75 TABLET, FILM COATED ORAL at 09:03

## 2018-03-22 RX ADMIN — NIFEDIPINE 30 MG: 30 TABLET, FILM COATED, EXTENDED RELEASE ORAL at 09:03

## 2018-03-22 RX ADMIN — FAMOTIDINE 20 MG: 20 TABLET, FILM COATED ORAL at 09:03

## 2018-03-22 RX ADMIN — SODIUM BICARBONATE 650 MG TABLET 650 MG: at 09:03

## 2018-03-22 RX ADMIN — ASPIRIN 81 MG: 81 TABLET, COATED ORAL at 09:03

## 2018-03-22 NOTE — HOSPITAL COURSE
Cardiology consulted initially for pt with elevated troponin to 13 with chest pain, no EKG changes, likely representing NSTEMI. Started on medical therapy with plan for further evaluation pending pt's SCr improvement. Chest pain resolved after <30 mins from initial event, troponins decreased after initial peak. Pt's JAMILAH score is 5, will recommend PET-Stress test while inpatient to rule out ongoing ischemia or other ischemic areas with [facility] to follow

## 2018-03-22 NOTE — SUBJECTIVE & OBJECTIVE
Interval History: No acute events. Yesterday he had an episode of nose bleeding which was recurrent on previous occasions. Has good urine output without lasix.     Review of patient's allergies indicates:  No Known Allergies  Current Facility-Administered Medications   Medication Frequency    acetaminophen suppository 650 mg Q4H PRN    allopurinol tablet 100 mg Daily    aspirin EC tablet 81 mg Daily    atorvastatin tablet 80 mg Daily    cephALEXin capsule 250 mg Daily    clopidogrel tablet 75 mg Daily    dextrose 50% injection 12.5 g PRN    dextrose 50% injection 25 g PRN    famotidine tablet 20 mg Daily    ferrous sulfate EC tablet 325 mg Daily    glucagon (human recombinant) injection 1 mg PRN    glucose chewable tablet 16 g PRN    glucose chewable tablet 24 g PRN    insulin aspart U-100 pen 0-5 Units QID (AC + HS) PRN    isosorbide mononitrate 24 hr tablet 120 mg Daily    magnesium oxide tablet 800 mg PRN    metoprolol tartrate (LOPRESSOR) tablet 25 mg BID    metroNIDAZOLE tablet 500 mg Q12H    [START ON 3/23/2018] NIFEdipine 24 hr tablet 60 mg Daily    nitroGLYCERIN SL tablet 0.4 mg Q5 Min PRN    oxymetazoline 0.05 % nasal spray 1 spray BID    potassium chloride 10% solution 40 mEq PRN    potassium, sodium phosphates 280-160-250 mg packet 2 packet PRN    ramelteon tablet 8 mg Nightly PRN    sodium bicarbonate tablet 650 mg Daily    sodium chloride 0.9% flush 5 mL PRN    [START ON 3/23/2018] warfarin (COUMADIN) tablet 5 mg Every Mon, Wed, Fri    [START ON 3/25/2018] warfarin (COUMADIN) tablet 5 mg Every Sun    [START ON 3/24/2018] warfarin (COUMADIN) tablet 7.5 mg Every Tues, Thurs, Sat       Objective:     Vital Signs (Most Recent):  Temp: 98.1 °F (36.7 °C) (03/22/18 1139)  Pulse: (!) 55 (03/22/18 1139)  Resp: 17 (03/22/18 1139)  BP: (!) 155/70 (03/22/18 1139)  SpO2: 96 % (03/22/18 1139)  O2 Device (Oxygen Therapy): room air (03/22/18 1139) Vital Signs (24h Range):  Temp:  [97.5 °F  (36.4 °C)-98.1 °F (36.7 °C)] 98.1 °F (36.7 °C)  Pulse:  [53-67] 55  Resp:  [16-18] 17  SpO2:  [95 %-98 %] 96 %  BP: (140-172)/(63-75) 155/70     Weight: 85.3 kg (188 lb 0.8 oz) (03/22/18 0428)  Body mass index is 31.29 kg/m².  Body surface area is 1.98 meters squared.    I/O last 3 completed shifts:  In: 2861.7 [P.O.:1290; I.V.:1571.7]  Out: 4251 [Urine:4250; Stool:1]    Physical Exam   Constitutional: He is oriented to person, place, and time. He appears well-developed and well-nourished.   HENT:   Head: Normocephalic.   Eyes: Pupils are equal, round, and reactive to light. Right eye exhibits no discharge. Left eye exhibits no discharge.   Neck: No JVD present.   Cardiovascular: Normal rate and regular rhythm.    Pulmonary/Chest: Effort normal. He has no wheezes. He has no rales.   Abdominal: Soft. He exhibits no distension. There is no tenderness.   Musculoskeletal: He exhibits no edema.   Neurological: He is alert and oriented to person, place, and time.   Skin: Skin is warm.   Vitals reviewed.    CBC:   Recent Labs  Lab 03/21/18  0630 03/21/18  1316 03/22/18  0717   WBC 5.91 6.23 5.74   RBC 3.58* 3.64* 3.67*   HGB 10.9* 11.0* 11.2*   HCT 32.0* 32.3* 33.0*   * 143* 138*   MCV 89 89 90   MCH 30.4 30.2 30.5   MCHC 34.1 34.1 33.9     BMP:   Recent Labs  Lab 03/21/18  0750 03/21/18  1731 03/22/18  0717    131* 95    139 141   K 4.5 4.8 4.8    108 110   CO2 23 20* 23   BUN 89* 84* 75*   CREATININE 3.6* 3.0* 2.3*   CALCIUM 9.0 9.5 9.8   MG  --   --  1.7     Significant Labs:  All labs within the past 24 hours have been reviewed.

## 2018-03-22 NOTE — ASSESSMENT & PLAN NOTE
- Known to have CKD stage III, with baseline GFR 30-60 and baseline sCr ~ 1.5-1.7  - Differential Diagnoses include pre-renal etiology given his history of acute diarrhea and decrease oral intake and slight improvement with low hydration.   - Will continue trending urine output and renal function    - Avoid NSAIDs, contrast, nephrotoxins, Monitor strict I/Os, daily weights  - Renally dose medications to current GFR  - Renal ultrasonography ruled out obstruction or post renal etiology  - Contiue improvement in his sCr with good hydration   - Carefully monitor his volume status and ensure good oral hydration, as his urine output increasing and net output is negative.

## 2018-03-22 NOTE — SUBJECTIVE & OBJECTIVE
Interval History: no bleeding from nasal cavity with merocel in place on left    Medications:  Continuous Infusions:  Scheduled Meds:   allopurinol  100 mg Oral Daily    aspirin  81 mg Oral Daily    atorvastatin  80 mg Oral Daily    cephALEXin  250 mg Oral Daily    clopidogrel  75 mg Oral Daily    famotidine  20 mg Oral Daily    ferrous sulfate  325 mg Oral Daily    isosorbide mononitrate  120 mg Oral Daily    metoprolol tartrate  25 mg Oral BID    metroNIDAZOLE  500 mg Oral Q12H    NIFEdipine  30 mg Oral Daily    oxymetazoline  1 spray Each Nare BID    sodium bicarbonate  1 tablet Oral Daily    warfarin  5 mg Oral Daily     PRN Meds:acetaminophen, dextrose 50%, dextrose 50%, glucagon (human recombinant), glucose, glucose, insulin aspart U-100, magnesium oxide, nitroGLYCERIN, potassium chloride 10%, potassium, sodium phosphates, ramelteon, sodium chloride 0.9%     Review of patient's allergies indicates:  No Known Allergies  Objective:     Vital Signs (24h Range):  Temp:  [97.5 °F (36.4 °C)-98 °F (36.7 °C)] 97.8 °F (36.6 °C)  Pulse:  [54-67] 56  Resp:  [16-18] 16  SpO2:  [93 %-98 %] 98 %  BP: (126-160)/(59-70) 160/70        Lines/Drains/Airways     Peripheral Intravenous Line                 Peripheral IV - Single Lumen 03/19/18 0529 Right Antecubital 3 days                Physical Exam    Significant Labs:  All pertinent labs from the last 24 hours have been reviewed.    CBC    Recent Labs  Lab 03/20/18  0558 03/21/18  0630 03/21/18  1316   WBC 7.79 5.91 6.23   HGB 11.7* 10.9* 11.0*   HCT 35.4* 32.0* 32.3*   MCV 91 89 89   * 134* 143*     BMP    Recent Labs  Lab 03/20/18  0558  03/21/18  0630 03/21/18  0749 03/21/18  0750 03/21/18  1731   GLU 72  < > 105  104 101  101 103 131*   *  < > 138  140 138  138 140 139   K 6.8*  < > 4.3  4.4 4.5  4.5 4.5 4.8     < > 109  110 107  107 109 108   CO2 15*  < > 19*  20* 23  23 23 20*   BUN 97*  < > 93*  92* 89*  89* 89* 84*    CREATININE 6.0*  < > 3.7*  3.6* 3.6*  3.6* 3.6* 3.0*   CALCIUM 9.4  < > 8.9  8.9 9.0  9.0 9.0 9.5   PHOS 4.2  --  3.4 3.3  --   --    MG 1.4*  --  1.0*  --   --   --    < > = values in this interval not displayed.  COAGS    Recent Labs  Lab 03/20/18  0825 03/21/18  0630   INR 4.1* 2.7*       Significant Diagnostics:  None

## 2018-03-22 NOTE — ASSESSMENT & PLAN NOTE
Bedside preliminary echocardiogram performed by cards fellow: EF 25-30% with inferolateral wall hypokinesis/akinesis. Mid and apical anterolateral wall akinesis. RAP<3, Moderate MR and TR. Official read revealed decrease in EF to 50-55% and hypokinesis in the following areas: mid anteroseptum, apical septum.  - Aspirin, 81 mg PO continue plaix   - BB<70 and treat for goal BP<130/80.   - C/w imdur 120 mg daily and lopressor 25 mg BID  - c/w lipitor 80 mg daily.  - Planned for inpatient LHC or Pet stress once Cr normalizes and metabolic derangements normalize

## 2018-03-22 NOTE — ASSESSMENT & PLAN NOTE
Assessment: 78 y/o male with multiple medical co-morbidities with left-sided epistaxis. He is currently on aspirin, plavix, and coumadin.   Controlled with Merocel nasal pack placed 3/21/18.      Plan:   - anti-staph antibiotics while pack is in place such as Keflex   - Nasal saline to bilateral nares q 4 hrs   - Afrin nasal spray 2 sprays q 4 hrs x 3 days  - Avoid nasal cannula; recommend humidified 02   - ENT will follow and remove pack when appropriate, likely Monday

## 2018-03-22 NOTE — PROGRESS NOTES
[facility]  Otorhinolaryngology-Head & Neck Surgery  Progress Note    Subjective:     Post-Op Info:  * No surgery found *      Hospital Day: 4     Interval History: no bleeding from nasal cavity with merocel in place on left    Medications:  Continuous Infusions:  Scheduled Meds:   allopurinol  100 mg Oral Daily    aspirin  81 mg Oral Daily    atorvastatin  80 mg Oral Daily    cephALEXin  250 mg Oral Daily    clopidogrel  75 mg Oral Daily    famotidine  20 mg Oral Daily    ferrous sulfate  325 mg Oral Daily    isosorbide mononitrate  120 mg Oral Daily    metoprolol tartrate  25 mg Oral BID    metroNIDAZOLE  500 mg Oral Q12H    NIFEdipine  30 mg Oral Daily    oxymetazoline  1 spray Each Nare BID    sodium bicarbonate  1 tablet Oral Daily    warfarin  5 mg Oral Daily     PRN Meds:acetaminophen, dextrose 50%, dextrose 50%, glucagon (human recombinant), glucose, glucose, insulin aspart U-100, magnesium oxide, nitroGLYCERIN, potassium chloride 10%, potassium, sodium phosphates, ramelteon, sodium chloride 0.9%     Review of patient's allergies indicates:  No Known Allergies  Objective:     Vital Signs (24h Range):  Temp:  [97.5 °F (36.4 °C)-98 °F (36.7 °C)] 97.8 °F (36.6 °C)  Pulse:  [54-67] 56  Resp:  [16-18] 16  SpO2:  [93 %-98 %] 98 %  BP: (126-160)/(59-70) 160/70        Lines/Drains/Airways     Peripheral Intravenous Line                 Peripheral IV - Single Lumen 03/19/18 0529 Right Antecubital 3 days                Physical Exam    Significant Labs:  All pertinent labs from the last 24 hours have been reviewed.    CBC    Recent Labs  Lab 03/20/18  0558 03/21/18  0630 03/21/18  1316   WBC 7.79 5.91 6.23   HGB 11.7* 10.9* 11.0*   HCT 35.4* 32.0* 32.3*   MCV 91 89 89   * 134* 143*     BMP    Recent Labs  Lab 03/20/18  0558  03/21/18  0630 03/21/18  0749 03/21/18  0750 03/21/18  1731   GLU 72  < > 105  104 101  101 103 131*   *  < > 138  140 138  138 140 139   K 6.8*   < > 4.3  4.4 4.5  4.5 4.5 4.8     < > 109  110 107  107 109 108   CO2 15*  < > 19*  20* 23  23 23 20*   BUN 97*  < > 93*  92* 89*  89* 89* 84*   CREATININE 6.0*  < > 3.7*  3.6* 3.6*  3.6* 3.6* 3.0*   CALCIUM 9.4  < > 8.9  8.9 9.0  9.0 9.0 9.5   PHOS 4.2  --  3.4 3.3  --   --    MG 1.4*  --  1.0*  --   --   --    < > = values in this interval not displayed.  COAGS    Recent Labs  Lab 03/20/18  0825 03/21/18  0630   INR 4.1* 2.7*       Significant Diagnostics:  None    Assessment/Plan:     Bleeding nose    Assessment: 76 y/o male with multiple medical co-morbidities with left-sided epistaxis. He is currently on aspirin, plavix, and coumadin.   Controlled with Merocel nasal pack placed 3/21/18.      Plan:   - anti-staph antibiotics while pack is in place such as Keflex   - Nasal saline to bilateral nares q 4 hrs   - Afrin nasal spray 2 sprays q 4 hrs x 3 days  - Avoid nasal cannula; recommend humidified 02   - ENT will follow and remove pack when appropriate, likely Monday             Donta Mix MD  Otorhinolaryngology-Head & Neck Surgery  [facility]

## 2018-03-22 NOTE — SUBJECTIVE & OBJECTIVE
Interval History: Pt is doing very well this morning, Epistaxis improved.  Denies chest pain.  Creatine continues to improve.       Review of Systems   HENT: Positive for nosebleeds.    Respiratory: Negative for shortness of breath.    Cardiovascular: Negative for chest pain and palpitations.     Objective:     Vital Signs (Most Recent):  Temp: 98.1 °F (36.7 °C) (03/22/18 1139)  Pulse: (!) 56 (03/22/18 1459)  Resp: 17 (03/22/18 1139)  BP: (!) 155/70 (03/22/18 1139)  SpO2: 96 % (03/22/18 1139) Vital Signs (24h Range):  Temp:  [97.5 °F (36.4 °C)-98.1 °F (36.7 °C)] 98.1 °F (36.7 °C)  Pulse:  [53-67] 56  Resp:  [16-18] 17  SpO2:  [95 %-98 %] 96 %  BP: (140-172)/(63-75) 155/70     Weight: 85.3 kg (188 lb 0.8 oz)  Body mass index is 31.29 kg/m².    Intake/Output Summary (Last 24 hours) at 03/22/18 1502  Last data filed at 03/22/18 1200   Gross per 24 hour   Intake              300 ml   Output             3001 ml   Net            -2701 ml      Physical Exam   Constitutional: He is oriented to person, place, and time. He appears well-developed and well-nourished.   HENT:   Head: Normocephalic and atraumatic.   Eyes: Conjunctivae and EOM are normal. Pupils are equal, round, and reactive to light.   Neck: Normal range of motion. Neck supple.   Cardiovascular: Normal rate, regular rhythm, normal heart sounds and intact distal pulses.    No murmur heard.  Pulmonary/Chest: Effort normal and breath sounds normal. No respiratory distress. He has no wheezes.   Abdominal: Soft. Bowel sounds are normal. He exhibits no distension and no mass. There is no tenderness. There is no guarding, no CVA tenderness, no tenderness at McBurney's point and negative Dove's sign.   Lymphadenopathy:     He has no cervical adenopathy.   Neurological: He is alert and oriented to person, place, and time. GCS eye subscore is 4. GCS verbal subscore is 5. GCS motor subscore is 6.   Skin: Skin is warm and dry.   Vitals reviewed.      Significant Labs: All  pertinent labs within the past 24 hours have been reviewed.    Significant Imaging: I have reviewed all pertinent imaging results/findings within the past 24 hours.

## 2018-03-22 NOTE — NURSING
Pt sitting up in recliner watching tv with family at bedside, awake and alert, no distress or complaints, denies pain, rhino rocket remains to left nare, no bleeding noted, recliner wheels locked, call button within reach, pt with no current needs or complaints

## 2018-03-22 NOTE — PROGRESS NOTES
[facility] Hospital Medicine  Progress Note    Patient Name: Dariel Urbina  MRN: [id number]  Patient Class: IP- Inpatient   Admission Date: 3/19/2018  Length of Stay: 3 days  Attending Physician: Lamberto Miranda MD  Primary Care Provider: Devon Langston MD    [facility] Medicine Team: [facility] Ike Ingram MD    Subjective:     Principal Problem:Acute renal failure superimposed on stage 3 chronic kidney disease    HPI:  Mr Urbina is a 76 yo male with PMH significant for HTN, CKD III, HLD, CAD, DMT2, gout, mechanical aortic valve, diastolic heart failure, and history of left-sided colectomy for diverticulosis who presents with diarrhea of three day duration with sudden onset. Diarrhea is watery nigh on clear without any mucus or blood. Pt c/o greater than 6 episodes of diarrhea per day for the past three days. Associated symptoms include weakness, fatigue and decreased urine output. He has not urinated for the past two days. He denies any abdominal pain or cramps currently. Pt has tried immodium, kaopectate and pepto bismal without any symptomatic benefit. He has never had an episode of diarrhea like this before. Pt cannot attribute this episode of diarrhea to any particular cause.     He denies any recent changes in diet or recent travel. Denies sick contacts, fevers, chills, headache, or confusion. Denies recent antibiotics or hospitalizations. Denies changes to medications. Review of systems was positive for shortness of breath, cough while eating, and decreased urine output. Pt also endorses a decreased appetite over the past two months resulting in an unintentional twenty pound weight loss.     Interval History: Pt is doing very well this morning, Epistaxis improved.  Denies chest pain.  Creatine continues to improve.       Review of Systems   HENT: Positive for nosebleeds.    Respiratory: Negative for shortness of breath.    Cardiovascular: Negative for chest pain and  palpitations.     Objective:     Vital Signs (Most Recent):  Temp: 98.1 °F (36.7 °C) (03/22/18 1139)  Pulse: (!) 56 (03/22/18 1459)  Resp: 17 (03/22/18 1139)  BP: (!) 155/70 (03/22/18 1139)  SpO2: 96 % (03/22/18 1139) Vital Signs (24h Range):  Temp:  [97.5 °F (36.4 °C)-98.1 °F (36.7 °C)] 98.1 °F (36.7 °C)  Pulse:  [53-67] 56  Resp:  [16-18] 17  SpO2:  [95 %-98 %] 96 %  BP: (140-172)/(63-75) 155/70     Weight: 85.3 kg (188 lb 0.8 oz)  Body mass index is 31.29 kg/m².    Intake/Output Summary (Last 24 hours) at 03/22/18 1502  Last data filed at 03/22/18 1200   Gross per 24 hour   Intake              300 ml   Output             3001 ml   Net            -2701 ml      Physical Exam   Constitutional: He is oriented to person, place, and time. He appears well-developed and well-nourished.   HENT:   Head: Normocephalic and atraumatic.   Eyes: Conjunctivae and EOM are normal. Pupils are equal, round, and reactive to light.   Neck: Normal range of motion. Neck supple.   Cardiovascular: Normal rate, regular rhythm, normal heart sounds and intact distal pulses.    No murmur heard.  Pulmonary/Chest: Effort normal and breath sounds normal. No respiratory distress. He has no wheezes.   Abdominal: Soft. Bowel sounds are normal. He exhibits no distension and no mass. There is no tenderness. There is no guarding, no CVA tenderness, no tenderness at McBurney's point and negative Dove's sign.   Lymphadenopathy:     He has no cervical adenopathy.   Neurological: He is alert and oriented to person, place, and time. GCS eye subscore is 4. GCS verbal subscore is 5. GCS motor subscore is 6.   Skin: Skin is warm and dry.   Vitals reviewed.      Significant Labs: All pertinent labs within the past 24 hours have been reviewed.    Significant Imaging: I have reviewed all pertinent imaging results/findings within the past 24 hours.    Assessment/Plan:      * Acute renal failure superimposed on stage 3 chronic kidney disease    Improving now s/p  IV fluids and resolution of diarrhea.  Hyperkalemia resolved.  Will continue to monitor.          Epistaxis    Now improved s/p ENT intervention, continue ancef for staph prophy           NSTEMI (non-ST elevated myocardial infarction)    Bedside preliminary echocardiogram performed by cards fellow: EF 25-30% with inferolateral wall hypokinesis/akinesis. Mid and apical anterolateral wall akinesis. RAP<3, Moderate MR and TR. Official read revealed decrease in EF to 50-55% and hypokinesis in the following areas: mid anteroseptum, apical septum.  - Aspirin, 81 mg PO continue plaix   - BB<70 and treat for goal BP<130/80.   - C/w imdur 120 mg daily and lopressor 25 mg BID  - c/w lipitor 80 mg daily.  - Planned for inpatient LHC or Pet stress once Cr normalizes and metabolic derangements normalize            Metabolic acidosis with normal anion gap and bicarbonate losses    Associated with severe diarrhea and kidney failure now improved, continue PO bicarb.           Gastroesophageal reflux disease without esophagitis    On H2-inhibitor, famotidine at home.   - Will hold in setting of JIM          Obesity, diabetes, and hypertension syndrome    Pt reports 20 lbs of unintentional weight loss d/t decreased appetite   - Cardiac diet          Hyperparathyroidism due to renal insufficiency              Type 2 diabetes mellitus with diabetic peripheral angiopathy without gangrene    A1c was 5.5. On glimiperide at home.   - SSI           Left ventricular diastolic dysfunction with preserved systolic function    ACE-inhibitor and furosemide at home. Will hold in setting of prerenal JIM.             Anemia of chronic renal failure, stage 3 (moderate)              CKD (chronic kidney disease), stage III    HYPERPARATHYROIDISM DUE TO RENAL INSUFFICIENCY  ANEMIA OF CHRONIC RENAL FAILURE, STAGE 3    GFR is usually in the 50's in the setting of high blood pressure. Corrected calcium was 10.0 mg/dL.  - Will continue to monitor and treat  his acute renal failure.   - Continue ferrous sulfate, 325 mg qd (per outpatient regimen)          H/O mechanical aortic valve replacement    LONG TERM CURRENT USE OF ANTICOAGULATION THERAPY    Pt on anticoagulation at home with daily warfarin. INR was 4.4 on admission. Has decreased PO intake for the past two months. INR 2.2 today. Will re-initiate warfarin.    - Warfarin 5 mg qd        History of gout    Allopurinol 100 mg qd          Renovascular hypertension    Home regimen includes fosinopril 40 mg qd, nifedipine 30 mg qd.  - Will hold fosinopril in setting of JIM  - Continue nifedipine.             Hyperlipidemia    On fenofibrate 200 mg at home and atorvastatin 80 mg qd  - Will continue statin          Long term current use of anticoagulant therapy              CAD (coronary atherosclerotic disease)    PERIPHERAL VASCULAR DISEASE    Takes high-intensity statin. Also has nitroglycerin 0.4 mg PRN. Denies every requiring nitro. Nil aspirin in the outpatient setting.   - Aspirin 81 mg  - Plavix 300 then 75 mg  - Atorvastatin 80 mg QD  - Isosorbide mononitrate 120 mg qd              PVD (peripheral vascular disease)                VTE Risk Mitigation         Ordered     warfarin (COUMADIN) tablet 5 mg  Every Sunday     Route:  Oral        03/22/18 0912     warfarin (COUMADIN) tablet 7.5 mg  Every Tues, Thurs, Sat     Route:  Oral        03/22/18 0911     warfarin (COUMADIN) tablet 5 mg  Every Mon, Wed, Fri     Route:  Oral        03/22/18 0912          Dispo: Continue inpatient admission for ARF, MI, and possible stress trest/ intervention.  Anticipate discharge home.     Ike Ingram MD  Department of Hospital Medicine   [facility]

## 2018-03-22 NOTE — SUBJECTIVE & OBJECTIVE
Interval History: NAEO, no new chest pain, no new EKG changes, trop trending down.    Review of Systems   Constitution: Negative for chills and fever.   HENT: Negative for congestion.    Cardiovascular: Positive for dyspnea on exertion (has chronic LÓPEZ and has not worsened). Negative for chest pain, irregular heartbeat, leg swelling, orthopnea, palpitations and syncope.   Respiratory: Positive for shortness of breath (mild chronic not worsened). Negative for cough.    Gastrointestinal: Positive for diarrhea. Negative for abdominal pain, nausea and vomiting.   Neurological: Negative for dizziness and headaches.     Objective:     Vital Signs (Most Recent):  Temp: 97.9 °F (36.6 °C) (03/22/18 0900)  Pulse: 63 (03/22/18 0900)  Resp: 17 (03/22/18 0900)  BP: (!) 172/75 (03/22/18 0900)  SpO2: 97 % (03/22/18 0900) Vital Signs (24h Range):  Temp:  [97.5 °F (36.4 °C)-98 °F (36.7 °C)] 97.9 °F (36.6 °C)  Pulse:  [54-67] 63  Resp:  [16-18] 17  SpO2:  [93 %-98 %] 97 %  BP: (126-172)/(59-75) 172/75     Weight: 85.3 kg (188 lb 0.8 oz)  Body mass index is 31.29 kg/m².     SpO2: 97 %  O2 Device (Oxygen Therapy): room air      Intake/Output Summary (Last 24 hours) at 03/22/18 1108  Last data filed at 03/22/18 0428   Gross per 24 hour   Intake              650 ml   Output             2001 ml   Net            -1351 ml       Lines/Drains/Airways     Peripheral Intravenous Line                 Peripheral IV - Single Lumen 03/19/18 0529 Right Antecubital 3 days                Physical Exam   Constitutional: He is oriented to person, place, and time. He appears well-developed and well-nourished.   HENT:   Head: Normocephalic and atraumatic.   Eyes: EOM are normal. Pupils are equal, round, and reactive to light.   Neck: Normal range of motion. Neck supple. No JVD present.   Cardiovascular: Normal rate, regular rhythm and intact distal pulses.    Murmur (holosystolic mumur heard loudes in the 4th and 5th spaces mid-clavicular line)  heard.  Pulmonary/Chest: Effort normal and breath sounds normal. He has no wheezes.   Abdominal: Soft. Bowel sounds are normal. There is no tenderness.   Musculoskeletal: Normal range of motion. He exhibits no edema.   Neurological: He is alert and oriented to person, place, and time. He has normal reflexes.   Vitals reviewed.      Significant Labs:   Troponin   Recent Labs  Lab 03/21/18  1150 03/21/18  1731 03/21/18  2353   TROPONINI 7.892* 6.627* 4.887*       Significant Imaging: EKG: Sinus with 1st deg AV block and LBBB

## 2018-03-22 NOTE — PROGRESS NOTES
[facility]  Cardiology  Progress Note    Patient Name: Dariel Urbina  MRN: [id number]  Admission Date: 3/19/2018  Hospital Length of Stay: 3 days  Code Status: Full Code   Attending Physician: Lamberto Miranda MD   Primary Care Physician: Devon Langston MD  Expected Discharge Date: 3/23/2018  Principal Problem:Acute renal failure superimposed on stage 3 chronic kidney disease    Subjective:     Hospital Course:   Cardiology consulted initially for pt with elevated troponin to 13 with chest pain, no EKG changes, likely representing NSTEMI. Started on medical therapy with plan for further evaluation pending pt's SCr improvement. Chest pain resolved after <30 mins from initial event, troponins decreased after initial peak. Pt's JAMILAH score is 5, will recommend PET-Stress test while inpatient to rule out ongoing ischemia or other ischemic areas.    Interval History: NAEO, no new chest pain, no new EKG changes, trop trending down.    Review of Systems   Constitution: Negative for chills and fever.   HENT: Negative for congestion.    Cardiovascular: Positive for dyspnea on exertion (has chronic LÓPEZ and has not worsened). Negative for chest pain, irregular heartbeat, leg swelling, orthopnea, palpitations and syncope.   Respiratory: Positive for shortness of breath (mild chronic not worsened). Negative for cough.    Gastrointestinal: Positive for diarrhea. Negative for abdominal pain, nausea and vomiting.   Neurological: Negative for dizziness and headaches.     Objective:     Vital Signs (Most Recent):  Temp: 97.9 °F (36.6 °C) (03/22/18 0900)  Pulse: 63 (03/22/18 0900)  Resp: 17 (03/22/18 0900)  BP: (!) 172/75 (03/22/18 0900)  SpO2: 97 % (03/22/18 0900) Vital Signs (24h Range):  Temp:  [97.5 °F (36.4 °C)-98 °F (36.7 °C)] 97.9 °F (36.6 °C)  Pulse:  [54-67] 63  Resp:  [16-18] 17  SpO2:  [93 %-98 %] 97 %  BP: (126-172)/(59-75) 172/75     Weight: 85.3 kg (188 lb 0.8 oz)  Body mass index is 31.29 kg/m².      SpO2: 97 %  O2 Device (Oxygen Therapy): room air      Intake/Output Summary (Last 24 hours) at 03/22/18 1108  Last data filed at 03/22/18 0428   Gross per 24 hour   Intake              650 ml   Output             2001 ml   Net            -1351 ml       Lines/Drains/Airways     Peripheral Intravenous Line                 Peripheral IV - Single Lumen 03/19/18 0529 Right Antecubital 3 days                Physical Exam   Constitutional: He is oriented to person, place, and time. He appears well-developed and well-nourished.   HENT:   Head: Normocephalic and atraumatic.   Eyes: EOM are normal. Pupils are equal, round, and reactive to light.   Neck: Normal range of motion. Neck supple. No JVD present.   Cardiovascular: Normal rate, regular rhythm and intact distal pulses.    Murmur (holosystolic mumur heard loudes in the 4th and 5th spaces mid-clavicular line) heard.  Pulmonary/Chest: Effort normal and breath sounds normal. He has no wheezes.   Abdominal: Soft. Bowel sounds are normal. There is no tenderness.   Musculoskeletal: Normal range of motion. He exhibits no edema.   Neurological: He is alert and oriented to person, place, and time. He has normal reflexes.   Vitals reviewed.      Significant Labs:   Troponin   Recent Labs  Lab 03/21/18  1150 03/21/18  1731 03/21/18  2353   TROPONINI 7.892* 6.627* 4.887*       Significant Imaging: EKG: Sinus with 1st deg AV block and LBBB    Assessment and Plan:       NSTEMI (non-ST elevated myocardial infarction)    Patient is a 78 yo male with a past medical hx significant for CAD (s/p CABG in 2002 - LIMA-LAD, SVG-OM2, multiple PCIs), AS (s/p mechanical AVR), HTN, HLD, PAD, T2DM, CKD III here with a NSTEMI superimposed upon his JIM on CKD and hypovolemia secondary to acute diarrhea. Cr is 4.4 and EKG shows no dynamic changes. Not on ACS protocol (not on DAPT) and not heparinized due to supratherapeutic INR. On BB and statin therapy and his antianginal therapy.     2D echo EF  40% without hypo/akinesis.    Plan:  - Initiate Aspirin, c/w plavix and hold heparin as the patient is supratherapeutic  - Would BB<70 and treat for goal BP<130/80  - C/w imdur 120 mg daily and lopressor 25 mg BID  - c/w lipitor 80 mg daily.  - defer to nephrology for management of JIM      Recommend inpatient PET stress to rule out ongoing ischemia or other areas of stress-induced ischemia which may be amenable to intervention.                VTE Risk Mitigation         Ordered     warfarin (COUMADIN) tablet 5 mg  Every Sunday     Route:  Oral        03/22/18 0912     warfarin (COUMADIN) tablet 7.5 mg  Every Tues, Thurs, Sat     Route:  Oral        03/22/18 0911     warfarin (COUMADIN) tablet 5 mg  Every Mon, Wed, Fri     Route:  Oral        03/22/18 0912          Florencio Mcdonough MD  Cardiology  [facility]

## 2018-03-22 NOTE — PROGRESS NOTES
[facility]  Nephrology  Progress Note    Patient Name: Dariel Urbina  MRN: [id number]  Admission Date: 3/19/2018  Hospital Length of Stay: 3 days  Attending Provider: Lamberto Miranda MD   Primary Care Physician: Devon Langston MD  Principal Problem:Acute renal failure superimposed on stage 3 chronic kidney disease    Subjective:     HPI: This is Mr. Dariel Urbina, 77 year old male with PMHx significant for PVD, CAD, mechanical aortic valve, on long term current use of anticoagulant therapy, HFpEF,  Hyperparathyroidism, GERD and HTN. He presented on 3/19/2018 with history of high output watery diarrhea for the last three days associated with weakness, fatigue and decrease oral intake, tried diarrhea medication with no success. No fever, sick contacts, recent exposure to antibiotics or recent hospitalization for other reasons. Nephrology consulted for Hyperkalemia in the setting of JIM.     Interval History: No acute events. Yesterday he had an episode of nose bleeding which was recurrent on previous occasions. Has good urine output without lasix.     Review of patient's allergies indicates:  No Known Allergies  Current Facility-Administered Medications   Medication Frequency    acetaminophen suppository 650 mg Q4H PRN    allopurinol tablet 100 mg Daily    aspirin EC tablet 81 mg Daily    atorvastatin tablet 80 mg Daily    cephALEXin capsule 250 mg Daily    clopidogrel tablet 75 mg Daily    dextrose 50% injection 12.5 g PRN    dextrose 50% injection 25 g PRN    famotidine tablet 20 mg Daily    ferrous sulfate EC tablet 325 mg Daily    glucagon (human recombinant) injection 1 mg PRN    glucose chewable tablet 16 g PRN    glucose chewable tablet 24 g PRN    insulin aspart U-100 pen 0-5 Units QID (AC + HS) PRN    isosorbide mononitrate 24 hr tablet 120 mg Daily    magnesium oxide tablet 800 mg PRN    metoprolol tartrate (LOPRESSOR) tablet 25 mg BID    metroNIDAZOLE tablet 500  mg Q12H    [START ON 3/23/2018] NIFEdipine 24 hr tablet 60 mg Daily    nitroGLYCERIN SL tablet 0.4 mg Q5 Min PRN    oxymetazoline 0.05 % nasal spray 1 spray BID    potassium chloride 10% solution 40 mEq PRN    potassium, sodium phosphates 280-160-250 mg packet 2 packet PRN    ramelteon tablet 8 mg Nightly PRN    sodium bicarbonate tablet 650 mg Daily    sodium chloride 0.9% flush 5 mL PRN    [START ON 3/23/2018] warfarin (COUMADIN) tablet 5 mg Every Mon, Wed, Fri    [START ON 3/25/2018] warfarin (COUMADIN) tablet 5 mg Every Sun    [START ON 3/24/2018] warfarin (COUMADIN) tablet 7.5 mg Every Tues, Thurs, Sat       Objective:     Vital Signs (Most Recent):  Temp: 98.1 °F (36.7 °C) (03/22/18 1139)  Pulse: (!) 55 (03/22/18 1139)  Resp: 17 (03/22/18 1139)  BP: (!) 155/70 (03/22/18 1139)  SpO2: 96 % (03/22/18 1139)  O2 Device (Oxygen Therapy): room air (03/22/18 1139) Vital Signs (24h Range):  Temp:  [97.5 °F (36.4 °C)-98.1 °F (36.7 °C)] 98.1 °F (36.7 °C)  Pulse:  [53-67] 55  Resp:  [16-18] 17  SpO2:  [95 %-98 %] 96 %  BP: (140-172)/(63-75) 155/70     Weight: 85.3 kg (188 lb 0.8 oz) (03/22/18 0428)  Body mass index is 31.29 kg/m².  Body surface area is 1.98 meters squared.    I/O last 3 completed shifts:  In: 2861.7 [P.O.:1290; I.V.:1571.7]  Out: 4251 [Urine:4250; Stool:1]    Physical Exam   Constitutional: He is oriented to person, place, and time. He appears well-developed and well-nourished.   HENT:   Head: Normocephalic.   Eyes: Pupils are equal, round, and reactive to light. Right eye exhibits no discharge. Left eye exhibits no discharge.   Neck: No JVD present.   Cardiovascular: Normal rate and regular rhythm.    Pulmonary/Chest: Effort normal. He has no wheezes. He has no rales.   Abdominal: Soft. He exhibits no distension. There is no tenderness.   Musculoskeletal: He exhibits no edema.   Neurological: He is alert and oriented to person, place, and time.   Skin: Skin is warm.   Vitals reviewed.    CBC:    Recent Labs  Lab 03/21/18  0630 03/21/18  1316 03/22/18  0717   WBC 5.91 6.23 5.74   RBC 3.58* 3.64* 3.67*   HGB 10.9* 11.0* 11.2*   HCT 32.0* 32.3* 33.0*   * 143* 138*   MCV 89 89 90   MCH 30.4 30.2 30.5   MCHC 34.1 34.1 33.9     BMP:   Recent Labs  Lab 03/21/18  0750 03/21/18  1731 03/22/18  0717    131* 95    139 141   K 4.5 4.8 4.8    108 110   CO2 23 20* 23   BUN 89* 84* 75*   CREATININE 3.6* 3.0* 2.3*   CALCIUM 9.0 9.5 9.8   MG  --   --  1.7     Significant Labs:  All labs within the past 24 hours have been reviewed.     Assessment/Plan:     * Acute renal failure superimposed on stage 3 chronic kidney disease    - Known to have CKD stage III, with baseline GFR 30-60 and baseline sCr ~ 1.5-1.7  - Differential Diagnoses include pre-renal etiology given his history of acute diarrhea and decrease oral intake and slight improvement with low hydration.   - Will continue trending urine output and renal function    - Avoid NSAIDs, contrast, nephrotoxins, Monitor strict I/Os, daily weights  - Renally dose medications to current GFR  - Renal ultrasonography ruled out obstruction or post renal etiology  - Contiue improvement in his sCr with good hydration   - Carefully monitor his volume status and ensure good oral hydration, as his urine output increasing and net output is negative.         Metabolic acidosis with normal anion gap and bicarbonate losses    - Most likely etiology is Diarrhea and bicarb loss  - Resolved.        Thank you for your consult. I will sign off. Please contact us if you have any additional questions. Please call with question or concern.     Dustin Ramsay MD  Nephrology  [facility]    ATTENDING PHYSICIAN ATTESTATION  I have personally interviewed and examined the patient. I thoroughly reviewed the demographic, clinical, laboratorial and imaging information available in medical records. I agree with the assessment and recommendations provided by  the subspecialty resident. Dr. Ramsay was under my supervision.

## 2018-03-22 NOTE — ASSESSMENT & PLAN NOTE
Patient is a 76 yo male with a past medical hx significant for CAD (s/p CABG in 2002 - LIMA-LAD, SVG-OM2, multiple PCIs), AS (s/p mechanical AVR), HTN, HLD, PAD, T2DM, CKD III here with a NSTEMI superimposed upon his JIM on CKD and hypovolemia secondary to acute diarrhea. Cr is 4.4 and EKG shows no dynamic changes. Not on ACS protocol (not on DAPT) and not heparinized due to supratherapeutic INR. On BB and statin therapy and his antianginal therapy.     2D echo EF 40% without hypo/akinesis.    Plan:  - Initiate Aspirin, c/w plavix and hold heparin as the patient is supratherapeutic  - Would BB<70 and treat for goal BP<130/80  - C/w imdur 120 mg daily and lopressor 25 mg BID  - c/w lipitor 80 mg daily.  - defer to nephrology for management of JIM      Recommend inpatient PET stress to rule out ongoing ischemia or other areas of stress-induced ischemia which may be amenable to intervention.

## 2018-03-22 NOTE — ASSESSMENT & PLAN NOTE
Improving now s/p IV fluids and resolution of diarrhea.  Hyperkalemia resolved.  Will continue to monitor.

## 2018-03-23 ENCOUNTER — CLINICAL SUPPORT (OUTPATIENT)
Dept: CARDIOLOGY | Facility: CLINIC | Age: 78
DRG: 682 | End: 2018-03-23
Payer: MEDICARE

## 2018-03-23 LAB
ANION GAP SERPL CALC-SCNC: 10 MMOL/L
BUN SERPL-MCNC: 65 MG/DL
CALCIUM SERPL-MCNC: 10.2 MG/DL
CHLORIDE SERPL-SCNC: 108 MMOL/L
CO2 SERPL-SCNC: 24 MMOL/L
CREAT SERPL-MCNC: 1.9 MG/DL
DIASTOLIC DYSFUNCTION: NO
EST. GFR  (AFRICAN AMERICAN): 38.7 ML/MIN/1.73 M^2
EST. GFR  (NON AFRICAN AMERICAN): 33.5 ML/MIN/1.73 M^2
GLUCOSE SERPL-MCNC: 93 MG/DL
INR PPP: 1.4
MAGNESIUM SERPL-MCNC: 1.4 MG/DL
PATHOLOGIST INTERPRETATION SPE: NORMAL
PHOSPHATE SERPL-MCNC: 2.6 MG/DL
POTASSIUM SERPL-SCNC: 5.4 MMOL/L
PROTHROMBIN TIME: 14.4 SEC
SODIUM SERPL-SCNC: 142 MMOL/L

## 2018-03-23 PROCEDURE — 63600175 PHARM REV CODE 636 W HCPCS: Performed by: STUDENT IN AN ORGANIZED HEALTH CARE EDUCATION/TRAINING PROGRAM

## 2018-03-23 PROCEDURE — 25000003 PHARM REV CODE 250: Performed by: STUDENT IN AN ORGANIZED HEALTH CARE EDUCATION/TRAINING PROGRAM

## 2018-03-23 PROCEDURE — 78492 MYOCRD IMG PET MLT RST&STRS: CPT | Mod: 26,,, | Performed by: INTERNAL MEDICINE

## 2018-03-23 PROCEDURE — 25000003 PHARM REV CODE 250: Performed by: INTERNAL MEDICINE

## 2018-03-23 PROCEDURE — 20600001 HC STEP DOWN PRIVATE ROOM

## 2018-03-23 PROCEDURE — 85610 PROTHROMBIN TIME: CPT

## 2018-03-23 PROCEDURE — 80048 BASIC METABOLIC PNL TOTAL CA: CPT

## 2018-03-23 PROCEDURE — 93016 CV STRESS TEST SUPVJ ONLY: CPT | Mod: ,,, | Performed by: INTERNAL MEDICINE

## 2018-03-23 PROCEDURE — 84100 ASSAY OF PHOSPHORUS: CPT

## 2018-03-23 PROCEDURE — 93018 CV STRESS TEST I&R ONLY: CPT | Mod: ,,, | Performed by: INTERNAL MEDICINE

## 2018-03-23 PROCEDURE — 99232 SBSQ HOSP IP/OBS MODERATE 35: CPT | Mod: GC,,, | Performed by: INTERNAL MEDICINE

## 2018-03-23 PROCEDURE — 83735 ASSAY OF MAGNESIUM: CPT

## 2018-03-23 RX ORDER — WARFARIN 2.5 MG/1
5 TABLET ORAL ONCE
Status: COMPLETED | OUTPATIENT
Start: 2018-03-23 | End: 2018-03-23

## 2018-03-23 RX ORDER — LANOLIN ALCOHOL/MO/W.PET/CERES
400 CREAM (GRAM) TOPICAL ONCE
Status: COMPLETED | OUTPATIENT
Start: 2018-03-23 | End: 2018-03-23

## 2018-03-23 RX ORDER — SODIUM BICARBONATE 650 MG/1
1 TABLET ORAL 2 TIMES DAILY
Status: DISCONTINUED | OUTPATIENT
Start: 2018-03-23 | End: 2018-03-23

## 2018-03-23 RX ORDER — CARVEDILOL 6.25 MG/1
6.25 TABLET ORAL 2 TIMES DAILY
Status: DISCONTINUED | OUTPATIENT
Start: 2018-03-23 | End: 2018-03-23 | Stop reason: SDUPTHER

## 2018-03-23 RX ORDER — CARVEDILOL 6.25 MG/1
6.25 TABLET ORAL 2 TIMES DAILY
Status: DISCONTINUED | OUTPATIENT
Start: 2018-03-23 | End: 2018-03-25

## 2018-03-23 RX ORDER — ENOXAPARIN SODIUM 150 MG/ML
1.5 INJECTION SUBCUTANEOUS
Status: DISCONTINUED | OUTPATIENT
Start: 2018-03-23 | End: 2018-03-25

## 2018-03-23 RX ADMIN — WARFARIN SODIUM 5 MG: 2.5 TABLET ORAL at 05:03

## 2018-03-23 RX ADMIN — Medication 1 CAPSULE: at 11:03

## 2018-03-23 RX ADMIN — MAGNESIUM OXIDE TAB 400 MG (241.3 MG ELEMENTAL MG) 400 MG: 400 (241.3 MG) TAB at 09:03

## 2018-03-23 RX ADMIN — OXYMETAZOLINE HYDROCHLORIDE 1 SPRAY: 5 SPRAY NASAL at 09:03

## 2018-03-23 RX ADMIN — ATORVASTATIN CALCIUM 80 MG: 20 TABLET, FILM COATED ORAL at 09:03

## 2018-03-23 RX ADMIN — ENOXAPARIN SODIUM 130 MG: 150 INJECTION SUBCUTANEOUS at 12:03

## 2018-03-23 RX ADMIN — FAMOTIDINE 20 MG: 20 TABLET, FILM COATED ORAL at 09:03

## 2018-03-23 RX ADMIN — CEPHALEXIN 500 MG: 500 CAPSULE ORAL at 09:03

## 2018-03-23 RX ADMIN — WARFARIN SODIUM 5 MG: 2.5 TABLET ORAL at 09:03

## 2018-03-23 RX ADMIN — SODIUM BICARBONATE 650 MG TABLET 650 MG: at 09:03

## 2018-03-23 RX ADMIN — ALLOPURINOL 100 MG: 100 TABLET ORAL at 09:03

## 2018-03-23 RX ADMIN — FERROUS SULFATE TAB EC 325 MG (65 MG FE EQUIVALENT) 325 MG: 325 (65 FE) TABLET DELAYED RESPONSE at 09:03

## 2018-03-23 RX ADMIN — METRONIDAZOLE 500 MG: 500 TABLET ORAL at 09:03

## 2018-03-23 RX ADMIN — SODIUM POLYSTYRENE SULFONATE 15 G: 15 SUSPENSION ORAL; RECTAL at 11:03

## 2018-03-23 RX ADMIN — Medication 1 CAPSULE: at 09:03

## 2018-03-23 RX ADMIN — CARVEDILOL 6.25 MG: 6.25 TABLET, FILM COATED ORAL at 09:03

## 2018-03-23 RX ADMIN — ISOSORBIDE MONONITRATE 120 MG: 60 TABLET, EXTENDED RELEASE ORAL at 09:03

## 2018-03-23 RX ADMIN — METOPROLOL TARTRATE 25 MG: 25 TABLET ORAL at 09:03

## 2018-03-23 RX ADMIN — NIFEDIPINE 60 MG: 60 TABLET, FILM COATED, EXTENDED RELEASE ORAL at 09:03

## 2018-03-23 RX ADMIN — CLOPIDOGREL BISULFATE 75 MG: 75 TABLET, FILM COATED ORAL at 09:03

## 2018-03-23 RX ADMIN — ASPIRIN 81 MG: 81 TABLET, COATED ORAL at 09:03

## 2018-03-23 NOTE — ASSESSMENT & PLAN NOTE
Pt reports 20 lbs of unintentional weight loss d/t decreased appetite   - Cardiac diet  - Follow up decreased appetite and weight loss in OP setting

## 2018-03-23 NOTE — ASSESSMENT & PLAN NOTE
Seen by ENT  - Cephalexin 500 mg BID   - Nasal saline to bilateral nares q 4 hrs   - Afrin nasal spray 2 sprays q 4 hrs x 3 days  - Avoid nasal cannula; recommend humidified 02   - ENT will follow and remove pack when appropriate, likely Monday

## 2018-03-23 NOTE — SUBJECTIVE & OBJECTIVE
Interval History: NAEO, no new chest pain, no new EKG changes, trop trending down.    Review of Systems   Constitution: Negative for chills and fever.   HENT: Negative for congestion.    Cardiovascular: Positive for dyspnea on exertion (has chronic LÓPEZ and has not worsened). Negative for chest pain, irregular heartbeat, leg swelling, orthopnea, palpitations and syncope.   Respiratory: Positive for shortness of breath (mild chronic not worsened). Negative for cough.    Gastrointestinal: Positive for diarrhea. Negative for abdominal pain, nausea and vomiting.   Neurological: Negative for dizziness and headaches.     Objective:     Vital Signs (Most Recent):  Temp: 98.1 °F (36.7 °C) (03/23/18 1155)  Pulse: 68 (03/23/18 1155)  Resp: 20 (03/23/18 1155)  BP: (!) 180/82 (03/23/18 1155)  SpO2: 97 % (03/23/18 1155) Vital Signs (24h Range):  Temp:  [97.7 °F (36.5 °C)-99 °F (37.2 °C)] 98.1 °F (36.7 °C)  Pulse:  [53-72] 68  Resp:  [16-20] 20  SpO2:  [93 %-98 %] 97 %  BP: (134-190)/(64-82) 180/82     Weight: 86.2 kg (190 lb 0.6 oz)  Body mass index is 31.62 kg/m².     SpO2: 97 %  O2 Device (Oxygen Therapy): room air      Intake/Output Summary (Last 24 hours) at 03/23/18 1238  Last data filed at 03/23/18 0701   Gross per 24 hour   Intake              480 ml   Output             1650 ml   Net            -1170 ml       Lines/Drains/Airways     Peripheral Intravenous Line                 Peripheral IV - Single Lumen 03/19/18 0529 Right Antecubital 4 days                Physical Exam   Constitutional: He is oriented to person, place, and time. He appears well-developed and well-nourished.   HENT:   Head: Normocephalic and atraumatic.   Eyes: EOM are normal. Pupils are equal, round, and reactive to light.   Neck: Normal range of motion. Neck supple. No JVD present.   Cardiovascular: Normal rate, regular rhythm and intact distal pulses.    Murmur (holosystolic mumur heard loudes in the 4th and 5th spaces mid-clavicular line)  heard.  Pulmonary/Chest: Effort normal and breath sounds normal. He has no wheezes.   Abdominal: Soft. Bowel sounds are normal. There is no tenderness.   Musculoskeletal: Normal range of motion. He exhibits no edema.   Neurological: He is alert and oriented to person, place, and time. He has normal reflexes.   Vitals reviewed.      Significant Labs:   Troponin     Recent Labs  Lab 03/21/18  1731 03/21/18  2353   TROPONINI 6.627* 4.887*       Significant Imaging: EKG: Sinus with 1st deg AV block and LBBB

## 2018-03-23 NOTE — ASSESSMENT & PLAN NOTE
Patient is a 76 yo male with a past medical hx significant for CAD (s/p CABG in 2002 - LIMA-LAD, SVG-OM2, multiple PCIs), AS (s/p mechanical AVR), HTN, HLD, PAD, T2DM, CKD III here with a NSTEMI superimposed upon his JIM on CKD and hypovolemia secondary to acute diarrhea. Cr is 4.4 and EKG shows no dynamic changes. Not on ACS protocol (not on DAPT) and not heparinized due to supratherapeutic INR. On BB and statin therapy and his antianginal therapy.     2D echo EF 40% without hypo/akinesis.    Plan:  - Initiate Aspirin, c/w plavix and hold heparin as the patient is supratherapeutic  - Would BB<70 and treat for goal BP<130/80  - C/w imdur 120 mg daily and lopressor 25 mg BID  - c/w lipitor 80 mg daily.  - defer to nephrology for management of JIM    PET completed, given pt's high risk and known CAD history as well as significant trop rise, WMAs, will pursue inpatient LHC early next week if pt is inpatient or outpatient if pt is discharged by that time.

## 2018-03-23 NOTE — PHYSICIAN QUERY
PT Name: Dariel Urbina  MR #: [id number]     Physician Query Form - Documentation Clarification      CDS/: Joyce Tellez RN              Contact information: [phone]    This form is a permanent document in the medical record.     Query Date: March 23, 2018    By submitting this query, we are merely seeking further clarification of documentation. Please utilize your independent clinical judgment when addressing the question(s) below.    The Medical record reflects the following:    Supporting Clinical Findings Location in Medical Record   6 beat run of vtach. Pt asymptomatic and vitals stable. Repeat troponin elevated. IM 3 aware. Will order EKG. Will monitor.        3/20 RN note   Cardiac monitoring  EKG       3/20 Nursing orders                                                                            Doctor, Please specify diagnosis or diagnoses associated with above clinical findings.                                                                                                          [ x  ] non-sustained ventricular tachycardia    [   ] other_________________________________    [  ] Clinically undetermined

## 2018-03-23 NOTE — NURSING
Pt sitting in chair, tolerating cardiac diet well. Denies CP, SOB. Stress test done in am. IM 3 notified of /82 after Coreg administered. Pt asymptomatic. Awaiting orders.

## 2018-03-23 NOTE — SUBJECTIVE & OBJECTIVE
Interval History: no bleeding from nasal cavity with merocel in place on left    Medications:  Continuous Infusions:  Scheduled Meds:   allopurinol  100 mg Oral Daily    aspirin  81 mg Oral Daily    atorvastatin  80 mg Oral Daily    cephALEXin  500 mg Oral Q12H    clopidogrel  75 mg Oral Daily    famotidine  20 mg Oral Daily    ferrous sulfate  325 mg Oral Daily    isosorbide mononitrate  120 mg Oral Daily    metoprolol tartrate  25 mg Oral BID    metroNIDAZOLE  500 mg Oral Q12H    NIFEdipine  60 mg Oral Daily    oxymetazoline  1 spray Each Nare BID    sodium bicarbonate  1 tablet Oral Daily    warfarin  5 mg Oral Every Mon, Wed, Fri    [START ON 3/25/2018] warfarin  5 mg Oral Every Sun    [START ON 3/24/2018] warfarin  7.5 mg Oral Every Tues, Thurs, Sat     PRN Meds:acetaminophen, dextrose 50%, dextrose 50%, glucagon (human recombinant), glucose, glucose, insulin aspart U-100, magnesium oxide, nitroGLYCERIN, potassium chloride 10%, potassium, sodium phosphates, ramelteon, sodium chloride 0.9%     Review of patient's allergies indicates:  No Known Allergies  Objective:     Vital Signs (24h Range):  Temp:  [97.7 °F (36.5 °C)-99 °F (37.2 °C)] 97.7 °F (36.5 °C)  Pulse:  [53-72] 53  Resp:  [16-18] 16  SpO2:  [93 %-98 %] 98 %  BP: (134-190)/(64-76) 190/76       Date 03/23/18 0700 - 03/24/18 0659   Shift 1747-5934 2209-4623 5700-1900 24 Hour Total   I  N  T  A  K  E   P.O. 0   0    Shift Total  (mL/kg) 0  (0)   0  (0)   O  U  T  P  U  T   Urine  (mL/kg/hr) 750   750    Shift Total  (mL/kg) 750  (8.7)   750  (8.7)   Weight (kg) 86.2 86.2 86.2 86.2     Lines/Drains/Airways     Peripheral Intravenous Line                 Peripheral IV - Single Lumen 03/19/18 0529 Right Antecubital 4 days                Physical Exam  NAD aao x 3  No bleeding from nasal cavity or oral cavity    Significant Labs:  All pertinent labs from the last 24 hours have been reviewed.    CBC    Recent Labs  Lab 03/21/18  0630  03/21/18  1316 03/22/18  0717   WBC 5.91 6.23 5.74   HGB 10.9* 11.0* 11.2*   HCT 32.0* 32.3* 33.0*   MCV 89 89 90   * 143* 138*     BMP    Recent Labs  Lab 03/21/18  0630 03/21/18  0749 03/21/18  0750 03/21/18  1731 03/22/18  0717     104 101  101 103 131* 95     140 138  138 140 139 141   K 4.3  4.4 4.5  4.5 4.5 4.8 4.8     110 107  107 109 108 110   CO2 19*  20* 23  23 23 20* 23   BUN 93*  92* 89*  89* 89* 84* 75*   CREATININE 3.7*  3.6* 3.6*  3.6* 3.6* 3.0* 2.3*   CALCIUM 8.9  8.9 9.0  9.0 9.0 9.5 9.8   PHOS 3.4 3.3  --   --  2.6*   MG 1.0*  --   --   --  1.7     COAGS    Recent Labs  Lab 03/20/18  0825 03/21/18  0630 03/22/18  0717   INR 4.1* 2.7* 1.8*       Significant Diagnostics:  None

## 2018-03-23 NOTE — PROGRESS NOTES
[facility]  Otorhinolaryngology-Head & Neck Surgery  Progress Note    Subjective:     Post-Op Info:  * No surgery found *      Hospital Day: 5     Interval History: no bleeding from nasal cavity with merocel in place on left    Medications:  Continuous Infusions:  Scheduled Meds:   allopurinol  100 mg Oral Daily    aspirin  81 mg Oral Daily    atorvastatin  80 mg Oral Daily    cephALEXin  500 mg Oral Q12H    clopidogrel  75 mg Oral Daily    famotidine  20 mg Oral Daily    ferrous sulfate  325 mg Oral Daily    isosorbide mononitrate  120 mg Oral Daily    metoprolol tartrate  25 mg Oral BID    metroNIDAZOLE  500 mg Oral Q12H    NIFEdipine  60 mg Oral Daily    oxymetazoline  1 spray Each Nare BID    sodium bicarbonate  1 tablet Oral Daily    warfarin  5 mg Oral Every Mon, Wed, Fri    [START ON 3/25/2018] warfarin  5 mg Oral Every Sun    [START ON 3/24/2018] warfarin  7.5 mg Oral Every Tues, Thurs, Sat     PRN Meds:acetaminophen, dextrose 50%, dextrose 50%, glucagon (human recombinant), glucose, glucose, insulin aspart U-100, magnesium oxide, nitroGLYCERIN, potassium chloride 10%, potassium, sodium phosphates, ramelteon, sodium chloride 0.9%     Review of patient's allergies indicates:  No Known Allergies  Objective:     Vital Signs (24h Range):  Temp:  [97.7 °F (36.5 °C)-99 °F (37.2 °C)] 97.7 °F (36.5 °C)  Pulse:  [53-72] 53  Resp:  [16-18] 16  SpO2:  [93 %-98 %] 98 %  BP: (134-190)/(64-76) 190/76       Date 03/23/18 0700 - 03/24/18 0659   Shift 9567-1906 6029-1726 1731-4936 24 Hour Total   I  N  T  A  K  E   P.O. 0   0    Shift Total  (mL/kg) 0  (0)   0  (0)   O  U  T  P  U  T   Urine  (mL/kg/hr) 750   750    Shift Total  (mL/kg) 750  (8.7)   750  (8.7)   Weight (kg) 86.2 86.2 86.2 86.2     Lines/Drains/Airways     Peripheral Intravenous Line                 Peripheral IV - Single Lumen 03/19/18 0529 Right Antecubital 4 days                Physical Exam  NAD aao x 3  No bleeding  from nasal cavity or oral cavity    Significant Labs:  All pertinent labs from the last 24 hours have been reviewed.    CBC    Recent Labs  Lab 03/21/18  0630 03/21/18  1316 03/22/18  0717   WBC 5.91 6.23 5.74   HGB 10.9* 11.0* 11.2*   HCT 32.0* 32.3* 33.0*   MCV 89 89 90   * 143* 138*     BMP    Recent Labs  Lab 03/21/18  0630 03/21/18  0749 03/21/18  0750 03/21/18  1731 03/22/18  0717     104 101  101 103 131* 95     140 138  138 140 139 141   K 4.3  4.4 4.5  4.5 4.5 4.8 4.8     110 107  107 109 108 110   CO2 19*  20* 23  23 23 20* 23   BUN 93*  92* 89*  89* 89* 84* 75*   CREATININE 3.7*  3.6* 3.6*  3.6* 3.6* 3.0* 2.3*   CALCIUM 8.9  8.9 9.0  9.0 9.0 9.5 9.8   PHOS 3.4 3.3  --   --  2.6*   MG 1.0*  --   --   --  1.7     COAGS    Recent Labs  Lab 03/20/18  0825 03/21/18  0630 03/22/18  0717   INR 4.1* 2.7* 1.8*       Significant Diagnostics:  None    Assessment/Plan:     Epistaxis    Assessment: 78 y/o male with multiple medical co-morbidities with left-sided epistaxis. He is currently on aspirin, plavix, and coumadin.   Controlled with Merocel nasal pack placed 3/21/18.      Plan:   - anti-staph antibiotics while pack is in place such as Keflex   - Nasal saline to bilateral nares q 4 hrs   - Afrin nasal spray 2 sprays q 4 hrs x 3 days  - Avoid nasal cannula; recommend humidified 02   - ENT will follow and remove pack when appropriate, likely Monday             Donta Mix MD  Otorhinolaryngology-Head & Neck Surgery  [facility]

## 2018-03-23 NOTE — ASSESSMENT & PLAN NOTE
On H2-inhibitor, famotidine at home.   - Will hold in setting of JIM  - Will restart on discharge

## 2018-03-23 NOTE — ASSESSMENT & PLAN NOTE
LONG TERM CURRENT USE OF ANTICOAGULATION THERAPY    Pt on anticoagulation at home with daily warfarin. INR was 4.4 on admission. Has decreased PO intake for the past two months. INR 1.4 today. Will re-initiate warfarin.   - Missed warfarin dose last night.   - One dose this AM with additional dose in the afternoon  - Warfarin 5 mg every Sun, Mon, Wed, Fri  - Warfarin 7.5 mg every Tue, Thu, Sat  - Target INR of 2.5 (range 2.0 to 3.0)

## 2018-03-23 NOTE — SUBJECTIVE & OBJECTIVE
Interval History: Pt is doing well this morning. Relayed to him the damage to myocardium noted on PET stress. Aware that he is scheduled for a LHC on Monday. He has had this procedure in the past.     Review of Systems  Objective:     Vital Signs (Most Recent):  Temp: 97.7 °F (36.5 °C) (03/23/18 0721)  Pulse: (!) 53 (03/23/18 0721)  Resp: 16 (03/23/18 0721)  BP: (!) 190/76 (03/23/18 0721)  SpO2: 98 % (03/23/18 0721) Vital Signs (24h Range):  Temp:  [97.7 °F (36.5 °C)-99 °F (37.2 °C)] 97.7 °F (36.5 °C)  Pulse:  [53-72] 53  Resp:  [16-18] 16  SpO2:  [93 %-98 %] 98 %  BP: (134-190)/(64-76) 190/76     Weight: 86.2 kg (190 lb 0.6 oz)  Body mass index is 31.62 kg/m².    Intake/Output Summary (Last 24 hours) at 03/23/18 0748  Last data filed at 03/23/18 0701   Gross per 24 hour   Intake              480 ml   Output             2650 ml   Net            -2170 ml      Physical Exam   Constitutional: He is oriented to person, place, and time. He appears well-developed and well-nourished.   HENT:   Head: Normocephalic and atraumatic.   Nasal packing in place   Eyes: Conjunctivae and EOM are normal. Pupils are equal, round, and reactive to light.   Neck: Normal range of motion. Neck supple.   Cardiovascular: Normal rate, regular rhythm, normal heart sounds and intact distal pulses.    No murmur heard.  Pulmonary/Chest: Effort normal and breath sounds normal. No respiratory distress. He has no wheezes.   Abdominal: Soft. Bowel sounds are normal. He exhibits no distension and no mass. There is no tenderness. There is no guarding, no CVA tenderness, no tenderness at McBurney's point and negative Dove's sign.   Lymphadenopathy:     He has no cervical adenopathy.   Neurological: He is alert and oriented to person, place, and time. GCS eye subscore is 4. GCS verbal subscore is 5. GCS motor subscore is 6.   Skin: Skin is warm and dry.   Vitals reviewed.      Significant Labs: All pertinent labs within the past 24 hours have been  reviewed.    Significant Imaging: I have reviewed all pertinent imaging results/findings within the past 24 hours.

## 2018-03-23 NOTE — ASSESSMENT & PLAN NOTE
Home regimen includes fosinopril 40 mg qd, nifedipine 30 mg qd.  - Will hold fosinopril in setting of JIM  - Continue nifedipine  - Will hold fosinopril d/t difficult to manage hyperkalemia

## 2018-03-23 NOTE — PLAN OF CARE
Problem: Patient Care Overview  Goal: Plan of Care Review  Outcome: Ongoing (interventions implemented as appropriate)  No acute events overnight.  All vital signs stable.  No complaints or chest pain.  AAOx4 ambulating independently.  Nasal packing placed by ent in place. Voiding in urinal for accurate measurement. Morning weight obtained.  Safety maintained.  WCTM.  Plan for cardiac pet scan stress test today.

## 2018-03-23 NOTE — ASSESSMENT & PLAN NOTE
HYPERPARATHYROIDISM DUE TO RENAL INSUFFICIENCY  ANEMIA OF CHRONIC RENAL FAILURE, STAGE 3    GFR is usually in the 50's in the setting of high blood pressure. Corrected calcium was 10.0 mg/dL.  - Acute renal failure has resolved   - Continue ferrous sulfate, 325 mg qd (per outpatient regimen)

## 2018-03-23 NOTE — PROGRESS NOTES
[facility]  Cardiology  Progress Note    Patient Name: Dariel Urbina  MRN: [id number]  Admission Date: 3/19/2018  Hospital Length of Stay: 4 days  Code Status: Full Code   Attending Physician: Lamberto Miranda MD   Primary Care Physician: Devon Langston MD  Expected Discharge Date: 3/23/2018  Principal Problem:Acute renal failure superimposed on stage 3 chronic kidney disease    Subjective:     Hospital Course:   Cardiology consulted initially for pt with elevated troponin to 13 with chest pain, no EKG changes, likely representing NSTEMI. Started on medical therapy with plan for further evaluation pending pt's SCr improvement. Chest pain resolved after <30 mins from initial event, troponins decreased after initial peak. Pt's JAMILAH score is 5, will recommend PET-Stress test while inpatient to rule out ongoing ischemia or other ischemic areas with [facility] to follow    Interval History: NAEO, no new chest pain, no new EKG changes, trop trending down.    Review of Systems   Constitution: Negative for chills and fever.   HENT: Negative for congestion.    Cardiovascular: Positive for dyspnea on exertion (has chronic LÓPEZ and has not worsened). Negative for chest pain, irregular heartbeat, leg swelling, orthopnea, palpitations and syncope.   Respiratory: Positive for shortness of breath (mild chronic not worsened). Negative for cough.    Gastrointestinal: Positive for diarrhea. Negative for abdominal pain, nausea and vomiting.   Neurological: Negative for dizziness and headaches.     Objective:     Vital Signs (Most Recent):  Temp: 98.1 °F (36.7 °C) (03/23/18 1155)  Pulse: 68 (03/23/18 1155)  Resp: 20 (03/23/18 1155)  BP: (!) 180/82 (03/23/18 1155)  SpO2: 97 % (03/23/18 1155) Vital Signs (24h Range):  Temp:  [97.7 °F (36.5 °C)-99 °F (37.2 °C)] 98.1 °F (36.7 °C)  Pulse:  [53-72] 68  Resp:  [16-20] 20  SpO2:  [93 %-98 %] 97 %  BP: (134-190)/(64-82) 180/82     Weight: 86.2 kg (190 lb 0.6 oz)  Body mass index is  31.62 kg/m².     SpO2: 97 %  O2 Device (Oxygen Therapy): room air      Intake/Output Summary (Last 24 hours) at 03/23/18 1238  Last data filed at 03/23/18 0701   Gross per 24 hour   Intake              480 ml   Output             1650 ml   Net            -1170 ml       Lines/Drains/Airways     Peripheral Intravenous Line                 Peripheral IV - Single Lumen 03/19/18 0529 Right Antecubital 4 days                Physical Exam   Constitutional: He is oriented to person, place, and time. He appears well-developed and well-nourished.   HENT:   Head: Normocephalic and atraumatic.   Eyes: EOM are normal. Pupils are equal, round, and reactive to light.   Neck: Normal range of motion. Neck supple. No JVD present.   Cardiovascular: Normal rate, regular rhythm and intact distal pulses.    Murmur (holosystolic mumur heard loudes in the 4th and 5th spaces mid-clavicular line) heard.  Pulmonary/Chest: Effort normal and breath sounds normal. He has no wheezes.   Abdominal: Soft. Bowel sounds are normal. There is no tenderness.   Musculoskeletal: Normal range of motion. He exhibits no edema.   Neurological: He is alert and oriented to person, place, and time. He has normal reflexes.   Vitals reviewed.      Significant Labs:   Troponin     Recent Labs  Lab 03/21/18  1731 03/21/18  2353   TROPONINI 6.627* 4.887*       Significant Imaging: EKG: Sinus with 1st deg AV block and LBBB    Assessment and Plan:     NSTEMI (non-ST elevated myocardial infarction)    Patient is a 76 yo male with a past medical hx significant for CAD (s/p CABG in 2002 - LIMA-LAD, SVG-OM2, multiple PCIs), AS (s/p mechanical AVR), HTN, HLD, PAD, T2DM, CKD III here with a NSTEMI superimposed upon his JIM on CKD and hypovolemia secondary to acute diarrhea. Cr is 4.4 and EKG shows no dynamic changes. Not on ACS protocol (not on DAPT) and not heparinized due to supratherapeutic INR. On BB and statin therapy and his antianginal therapy.     2D echo EF 40%  without hypo/akinesis.    Plan:  - Initiate Aspirin, c/w plavix and hold heparin as the patient is supratherapeutic  - Would BB<70 and treat for goal BP<130/80  - C/w imdur 120 mg daily and lopressor 25 mg BID  - c/w lipitor 80 mg daily.  - defer to nephrology for management of JIM    PET completed, given pt's high risk and known CAD history as well as significant trop rise, WMAs, will pursue inpatient LHC early next week if pt is inpatient or outpatient if pt is discharged by that time.            VTE Risk Mitigation         Ordered     warfarin (COUMADIN) tablet 5 mg  Every Sunday     Route:  Oral        03/22/18 0912     warfarin (COUMADIN) tablet 7.5 mg  Every Tues, Thurs, Sat     Route:  Oral        03/22/18 0911     warfarin (COUMADIN) tablet 5 mg  Every Mon, Wed, Fri     Route:  Oral        03/22/18 0912     enoxaparin injection 130 mg  Every 24 hours (non-standard times)     Route:  Subcutaneous        03/23/18 1132          Florencio Mcdonough MD  Cardiology  [facility]

## 2018-03-23 NOTE — ASSESSMENT & PLAN NOTE
Bedside preliminary echocardiogram performed by cards fellow: EF 25-30% with inferolateral wall hypokinesis/akinesis. Mid and apical anterolateral wall akinesis. RAP<3, Moderate MR and TR. Official read revealed decrease in EF to 50-55% and hypokinesis in the following areas: mid anteroseptum, apical septum.  - Aspirin, 81 mg PO continue plavix   - BB <70 and treat for goal BP<130/80.   - C/w imdur 120 mg daily   - Transitioned to carvedilol 6.25 mg BID for HTN  - c/w lipitor 80 mg daily  - PET stress resulted as follows:     CONCLUSIONS: ABNORMAL MYOCARDIAL PERFUSION PET STRESS TEST  1. There is a moderate sized mild to moderate ischemia in the base to distal inferolateral wall. This defect comprises 15 % of the left ventricular myocardium.   2. Resting wall motion is physiologic. Stress wall motion is physiologic.   3. LV function is normal at rest and stress.  (normal is >= 51%)  4. The ventricular volumes are normal at rest and stress.   5. The extracardiac distribution of radioactivity is normal.   6. When compared to the previous study from 12/8/16, there is no signficant change.    - Cardiology plans for [facility] early next week.

## 2018-03-24 LAB
ANION GAP SERPL CALC-SCNC: 10 MMOL/L
BACTERIA BLD CULT: NORMAL
BACTERIA BLD CULT: NORMAL
BUN SERPL-MCNC: 70 MG/DL
CALCIUM SERPL-MCNC: 9.6 MG/DL
CHLORIDE SERPL-SCNC: 106 MMOL/L
CO2 SERPL-SCNC: 22 MMOL/L
CREAT SERPL-MCNC: 2 MG/DL
EST. GFR  (AFRICAN AMERICAN): 36.4 ML/MIN/1.73 M^2
EST. GFR  (NON AFRICAN AMERICAN): 31.5 ML/MIN/1.73 M^2
GLUCOSE SERPL-MCNC: 87 MG/DL
INR PPP: 1.4
MAGNESIUM SERPL-MCNC: 1.5 MG/DL
PHOSPHATE SERPL-MCNC: 3.8 MG/DL
POTASSIUM SERPL-SCNC: 4.9 MMOL/L
PROTHROMBIN TIME: 14.1 SEC
SODIUM SERPL-SCNC: 138 MMOL/L

## 2018-03-24 PROCEDURE — 84100 ASSAY OF PHOSPHORUS: CPT

## 2018-03-24 PROCEDURE — 80048 BASIC METABOLIC PNL TOTAL CA: CPT

## 2018-03-24 PROCEDURE — 94761 N-INVAS EAR/PLS OXIMETRY MLT: CPT

## 2018-03-24 PROCEDURE — 99232 SBSQ HOSP IP/OBS MODERATE 35: CPT | Mod: GC,,, | Performed by: INTERNAL MEDICINE

## 2018-03-24 PROCEDURE — 63600175 PHARM REV CODE 636 W HCPCS: Performed by: STUDENT IN AN ORGANIZED HEALTH CARE EDUCATION/TRAINING PROGRAM

## 2018-03-24 PROCEDURE — 83735 ASSAY OF MAGNESIUM: CPT

## 2018-03-24 PROCEDURE — 25000003 PHARM REV CODE 250: Performed by: INTERNAL MEDICINE

## 2018-03-24 PROCEDURE — 85610 PROTHROMBIN TIME: CPT

## 2018-03-24 PROCEDURE — 20600001 HC STEP DOWN PRIVATE ROOM

## 2018-03-24 PROCEDURE — 25000003 PHARM REV CODE 250: Performed by: STUDENT IN AN ORGANIZED HEALTH CARE EDUCATION/TRAINING PROGRAM

## 2018-03-24 PROCEDURE — 36415 COLL VENOUS BLD VENIPUNCTURE: CPT

## 2018-03-24 RX ORDER — METRONIDAZOLE 500 MG/1
500 TABLET ORAL EVERY 12 HOURS
Status: DISCONTINUED | OUTPATIENT
Start: 2018-03-24 | End: 2018-03-28 | Stop reason: HOSPADM

## 2018-03-24 RX ADMIN — ATORVASTATIN CALCIUM 80 MG: 20 TABLET, FILM COATED ORAL at 08:03

## 2018-03-24 RX ADMIN — CARVEDILOL 6.25 MG: 6.25 TABLET, FILM COATED ORAL at 08:03

## 2018-03-24 RX ADMIN — NIFEDIPINE 60 MG: 60 TABLET, FILM COATED, EXTENDED RELEASE ORAL at 08:03

## 2018-03-24 RX ADMIN — CEPHALEXIN 500 MG: 500 CAPSULE ORAL at 08:03

## 2018-03-24 RX ADMIN — Medication 1 CAPSULE: at 08:03

## 2018-03-24 RX ADMIN — ASPIRIN 81 MG: 81 TABLET, COATED ORAL at 08:03

## 2018-03-24 RX ADMIN — SODIUM CHLORIDE 500 ML: 9 INJECTION, SOLUTION INTRAVENOUS at 01:03

## 2018-03-24 RX ADMIN — CLOPIDOGREL BISULFATE 75 MG: 75 TABLET, FILM COATED ORAL at 08:03

## 2018-03-24 RX ADMIN — FERROUS SULFATE TAB EC 325 MG (65 MG FE EQUIVALENT) 325 MG: 325 (65 FE) TABLET DELAYED RESPONSE at 08:03

## 2018-03-24 RX ADMIN — ENOXAPARIN SODIUM 130 MG: 150 INJECTION SUBCUTANEOUS at 04:03

## 2018-03-24 RX ADMIN — ALLOPURINOL 100 MG: 100 TABLET ORAL at 08:03

## 2018-03-24 RX ADMIN — WARFARIN SODIUM 7.5 MG: 7.5 TABLET ORAL at 05:03

## 2018-03-24 RX ADMIN — ISOSORBIDE MONONITRATE 120 MG: 60 TABLET, EXTENDED RELEASE ORAL at 08:03

## 2018-03-24 RX ADMIN — METRONIDAZOLE 500 MG: 500 TABLET ORAL at 08:03

## 2018-03-24 RX ADMIN — FAMOTIDINE 20 MG: 20 TABLET, FILM COATED ORAL at 08:03

## 2018-03-24 NOTE — PROGRESS NOTES
[facility]  Otorhinolaryngology-Head & Neck Surgery  Progress Note    Subjective:     Post-Op Info:  * No surgery found *      Hospital Day: 6     Interval History: no bleeding from nasal cavity with merocel in place on left    Medications:  Continuous Infusions:  Scheduled Meds:   allopurinol  100 mg Oral Daily    aspirin  81 mg Oral Daily    atorvastatin  80 mg Oral Daily    carvedilol  6.25 mg Oral BID    cephALEXin  500 mg Oral Q12H    clopidogrel  75 mg Oral Daily    enoxaparin  1.5 mg/kg Subcutaneous Q24H    famotidine  20 mg Oral Daily    ferrous sulfate  325 mg Oral Daily    isosorbide mononitrate  120 mg Oral Daily    Lactobacillus rhamnosus GG  1 capsule Oral BID    NIFEdipine  60 mg Oral Daily    warfarin  5 mg Oral Every Mon, Wed, Fri    [START ON 3/25/2018] warfarin  5 mg Oral Every Sun    warfarin  7.5 mg Oral Every Tues, Thurs, Sat     PRN Meds:acetaminophen, dextrose 50%, dextrose 50%, glucagon (human recombinant), glucose, glucose, insulin aspart U-100, magnesium oxide, nitroGLYCERIN, potassium chloride 10%, potassium, sodium phosphates, ramelteon, sodium chloride 0.9%     Review of patient's allergies indicates:  No Known Allergies  Objective:     Vital Signs (24h Range):  Temp:  [97.4 °F (36.3 °C)-98.6 °F (37 °C)] 97.7 °F (36.5 °C)  Pulse:  [58-77] 76  Resp:  [16-20] 16  SpO2:  [93 %-97 %] 96 %  BP: (112-180)/(53-92) 175/77        Lines/Drains/Airways     Peripheral Intravenous Line                 Peripheral IV - Single Lumen 03/23/18 1835 Left Forearm less than 1 day                Physical Exam  NAD aao x 3  No bleeding from nasal cavity or oral cavity  No resp distress    Significant Labs:  All pertinent labs from the last 24 hours have been reviewed.    CBC    Recent Labs  Lab 03/21/18  1316 03/22/18  0717   WBC 6.23 5.74   HGB 11.0* 11.2*   HCT 32.3* 33.0*   MCV 89 90   * 138*     BMP    Recent Labs  Lab 03/22/18  0717 03/23/18  0520 03/23/18  0521  03/24/18  0330   GLU 95 93  --  87    142  --  138   K 4.8 5.4*  --  4.9    108  --  106   CO2 23 24  --  22*   BUN 75* 65*  --  70*   CREATININE 2.3* 1.9*  --  2.0*   CALCIUM 9.8 10.2  --  9.6   PHOS 2.6*  --  2.6* 3.8   MG 1.7  --  1.4* 1.5*     COAGS    Recent Labs  Lab 03/22/18  0717 03/23/18  0521 03/24/18  0330   INR 1.8* 1.4* 1.4*       Significant Diagnostics:  None    Assessment/Plan:     Bleeding nose    Assessment: 76 y/o male with multiple medical co-morbidities with left-sided epistaxis. He is currently on aspirin, plavix, and coumadin.   Controlled with Merocel nasal pack placed 3/21/18.      Plan:   - anti-staph antibiotics while pack is in place such as Keflex   - Nasal saline to bilateral nares q 4 hrs   - Afrin nasal spray 2 sprays q 4 hrs x 3 days  - Avoid nasal cannula; recommend humidified 02   - ENT will follow and remove pack when appropriate, likely Monday             Donta Mix MD  Otorhinolaryngology-Head & Neck Surgery  [facility]

## 2018-03-24 NOTE — PLAN OF CARE
Problem: Patient Care Overview  Goal: Plan of Care Review  Outcome: Ongoing (interventions implemented as appropriate)   03/24/18 1842   Coping/Psychosocial   Plan Of Care Reviewed With patient;spouse       Pt up in chair, ambulates to bathroom without difficulty, denies pain or SOB at rest or upon exertion.  VSS, BP > 130 Mds aware, TABATHA meds per MAR, POC reviewed with pt and wife, IV bolus per orders, adequate output, I Os per flowsheet.  Pt denies pain or further needs , call light within reach, will continue to monitor.     Problem: Fall Risk (Adult)  Intervention: Safety Promotion/Fall Prevention   03/24/18 1842   Safety Interventions   Safety Promotion/Fall Prevention nonskid shoes/socks when out of bed;medications reviewed;Fall Risk reviewed with patient/family         Problem: Renal Failure/Kidney Injury, Acute (Adult)  Intervention: Monitor/Manage Fluid, Acid Base Balance   03/24/18 1842   Nutrition Interventions   Fluid/Electrolyte Management fluids provided   Safety Interventions   Medication Review/Management medications reviewed

## 2018-03-24 NOTE — MEDICAL/APP STUDENT
Progress Note  Hospital Medicine    Patient Name: Dariel Urbina  YOB: 1940    Admit Date: 3/19/2018                     LOS: 5    SUBJECTIVE:     Reason for Admission:  Acute renal failure superimposed on stage 3 chronic kidney disease  See H&P for detailed presentating history and ROS.      Interval history: Pt is AAOx4. Ambulates independently. Pt denies chest pain, SOB, and fatigue. Pt slept through the night and is in no apparent distress.     Review of Systems   Constitution: Negative for chills and fever.   HENT: Negative for congestion.    Cardiovascular: Positive for dyspnea on exertion. Negative for chest pain, irregular heartbeat, leg swelling, orthopnea, palpitations and syncope.   Respiratory: Pt denies SOB. Negative for cough.    Gastrointestinal: Negative for diarrhea. Had no BM overnight. Urination is normal  Neurological: Negative for dizziness and headaches.     OBJECTIVE:     Vital Signs Range (Last 24H):  Temp:  [97.4 °F (36.3 °C)-98.6 °F (37 °C)]   Pulse:  [53-77]   Resp:  [16-20]   BP: (112-190)/(53-92)   SpO2:  [93 %-98 %] Body mass index is 30.78 kg/m².  Wt Readings from Last 1 Encounters:   03/24/18 0507 83.9 kg (184 lb 15.5 oz)   03/23/18 0318 86.2 kg (190 lb 0.6 oz)   03/22/18 0428 85.3 kg (188 lb 0.8 oz)   03/19/18 2303 85.3 kg (188 lb 0.8 oz)   03/19/18 1432 84.4 kg (186 lb)   03/19/18 0510 84.4 kg (186 lb)       I & O (Last 24H):  Intake/Output Summary (Last 24 hours) at 03/24/18 0711  Last data filed at 03/24/18 0507   Gross per 24 hour   Intake              960 ml   Output             1325 ml   Net             -365 ml       Physical Exam:  Dried blood present around left nostril.   Cardio- Aortic mechanical valve click is present.   Resp- lungs are clear to ascultation  GI- No pain, guarding or rebound tenderness      Diagnostic Results:  Lab Results   Component Value Date    WBC 5.74 03/22/2018    HGB 11.2 (L) 03/22/2018    HCT 33.0 (L) 03/22/2018    MCV 90  03/22/2018     (L) 03/22/2018       Recent Labs  Lab 03/24/18  0330   GLU 87      K 4.9      CO2 22*   BUN 70*   CREATININE 2.0*   CALCIUM 9.6   MG 1.5*     Lab Results   Component Value Date    INR 1.4 (H) 03/24/2018    INR 1.4 (H) 03/23/2018    INR 1.8 (H) 03/22/2018       ASSESSMENT/PLAN:     Pt is a 76 yo male with CAD, HTN, CKDIII, and aortic valve replacement in 1993, presented to the ED with JIM and had an NSTEMI in hospital    Problems Addressed Today:    JIM  -kidney function is improving.    NSTEMI-  - Waiting for pt kidney function to improve to receive a cardiac catheterization.  -Given an increase dose of carvedilol to decrease Bp. Debated using hydralazine, but was worried about rebound increase BP.    AS  -Pt INR has been at 1.4 since yesterday. Pt was given an extra dose of warfarin. We should monitor INR and get above 2.    Nosebleed-  -Merocel pack will be removed Monday  -keflex will be continued      DISCHARGE PLANNING:    TIME SPENT: I spent ***minutes evaluating, treating, counseling, and coordinating care for the patient.    Signing Physician:  Jessica Connors

## 2018-03-24 NOTE — PROGRESS NOTES
[facility] Hospital Medicine  Progress Note    Patient Name: Dariel Urbina  MRN: [id number]  Patient Class: IP- Inpatient   Admission Date: 3/19/2018  Length of Stay: 5 days  Attending Physician: Lamberto Miranda MD  Primary Care Provider: Devon Langston MD    [facility] Medicine Team: [facility] HOSP MED 3 Behram Khan, MD    Subjective:     Principal Problem:Acute renal failure superimposed on stage 3 chronic kidney disease    HPI:  Mr Urbina is a 78 yo male with PMH significant for HTN, CKD III, HLD, CAD, DMT2, gout, mechanical aortic valve, diastolic heart failure, and history of left-sided colectomy for diverticulosis who presents with diarrhea of three day duration with sudden onset. Diarrhea is watery nigh on clear without any mucus or blood. Pt c/o greater than 6 episodes of diarrhea per day for the past three days. Associated symptoms include weakness, fatigue and decreased urine output. He has not urinated for the past two days. He denies any abdominal pain or cramps currently. Pt has tried immodium, kaopectate and pepto bismal without any symptomatic benefit. He has never had an episode of diarrhea like this before. Pt cannot attribute this episode of diarrhea to any particular cause.     He denies any recent changes in diet or recent travel. Denies sick contacts, fevers, chills, headache, or confusion. Denies recent antibiotics or hospitalizations. Denies changes to medications. Review of systems was positive for shortness of breath, cough while eating, and decreased urine output. Pt also endorses a decreased appetite over the past two months resulting in an unintentional twenty pound weight loss.     Hospital Course:  No notes on file    Interval History: Pt is doing well this morning. Relayed to him the damage to myocardium noted on PET stress. Aware that he is scheduled for a LHC on Monday. He has had this procedure in the past.     Review of Systems  Objective:     Vital Signs (Most  Recent):  Temp: 97.7 °F (36.5 °C) (03/23/18 0721)  Pulse: (!) 53 (03/23/18 0721)  Resp: 16 (03/23/18 0721)  BP: (!) 190/76 (03/23/18 0721)  SpO2: 98 % (03/23/18 0721) Vital Signs (24h Range):  Temp:  [97.7 °F (36.5 °C)-99 °F (37.2 °C)] 97.7 °F (36.5 °C)  Pulse:  [53-72] 53  Resp:  [16-18] 16  SpO2:  [93 %-98 %] 98 %  BP: (134-190)/(64-76) 190/76     Weight: 86.2 kg (190 lb 0.6 oz)  Body mass index is 31.62 kg/m².    Intake/Output Summary (Last 24 hours) at 03/23/18 0748  Last data filed at 03/23/18 0701   Gross per 24 hour   Intake              480 ml   Output             2650 ml   Net            -2170 ml      Physical Exam   Constitutional: He is oriented to person, place, and time. He appears well-developed and well-nourished.   HENT:   Head: Normocephalic and atraumatic.   Nasal packing in place   Eyes: Conjunctivae and EOM are normal. Pupils are equal, round, and reactive to light.   Neck: Normal range of motion. Neck supple.   Cardiovascular: Normal rate, regular rhythm, normal heart sounds and intact distal pulses.    No murmur heard.  Pulmonary/Chest: Effort normal and breath sounds normal. No respiratory distress. He has no wheezes.   Abdominal: Soft. Bowel sounds are normal. He exhibits no distension and no mass. There is no tenderness. There is no guarding, no CVA tenderness, no tenderness at McBurney's point and negative Dove's sign.   Lymphadenopathy:     He has no cervical adenopathy.   Neurological: He is alert and oriented to person, place, and time. GCS eye subscore is 4. GCS verbal subscore is 5. GCS motor subscore is 6.   Skin: Skin is warm and dry.   Vitals reviewed.      Significant Labs: All pertinent labs within the past 24 hours have been reviewed.    Significant Imaging: I have reviewed all pertinent imaging results/findings within the past 24 hours.    Assessment/Plan:      * Acute renal failure superimposed on stage 3 chronic kidney disease    Improving now s/p IV fluids and resolution of  diarrhea. Will continue to monitor.        NSTEMI (non-ST elevated myocardial infarction)    Bedside preliminary echocardiogram performed by cards fellow: EF 25-30% with inferolateral wall hypokinesis/akinesis. Mid and apical anterolateral wall akinesis. RAP<3, Moderate MR and TR. Official read revealed decrease in EF to 50-55% and hypokinesis in the following areas: mid anteroseptum, apical septum.  - Aspirin, 81 mg PO continue plavix   - BB <70 and treat for goal BP<130/80.   - C/w imdur 120 mg daily   - Transitioned to carvedilol 6.25 mg BID for HTN  - c/w lipitor 80 mg daily  - PET stress resulted as follows:     CONCLUSIONS: ABNORMAL MYOCARDIAL PERFUSION PET STRESS TEST  1. There is a moderate sized mild to moderate ischemia in the base to distal inferolateral wall. This defect comprises 15 % of the left ventricular myocardium.   2. Resting wall motion is physiologic. Stress wall motion is physiologic.   3. LV function is normal at rest and stress.  (normal is >= 51%)  4. The ventricular volumes are normal at rest and stress.   5. The extracardiac distribution of radioactivity is normal.   6. When compared to the previous study from 12/8/16, there is no signficant change.    - Cardiology plans for [facility] early next week.           Bleeding nose    Seen by ENT  - Cephalexin 500 mg BID   - Nasal saline to bilateral nares q 4 hrs   - Afrin nasal spray 2 sprays q 4 hrs x 3 days  - Avoid nasal cannula; recommend humidified 02   - ENT will follow and remove pack when appropriate, likely Monday         Metabolic acidosis with normal anion gap and bicarbonate losses    Associated with severe diarrhea and kidney failure now improved        CAD (coronary atherosclerotic disease)    PERIPHERAL VASCULAR DISEASE    Takes high-intensity statin. Also has nitroglycerin 0.4 mg PRN. Denies every requiring nitro. Nil aspirin in the outpatient setting.   - Aspirin 81 mg  - Plavix 300 then 75 mg  - Atorvastatin 80 mg QD  - Isosorbide  mononitrate 120 mg qd              H/O mechanical aortic valve replacement    LONG TERM CURRENT USE OF ANTICOAGULATION THERAPY    Pt on anticoagulation at home with daily warfarin. INR was 4.4 on admission. Has decreased PO intake for the past two months. INR 1.4 today. Will re-initiate warfarin.   - Missed warfarin dose last night.   - One dose this AM with additional dose in the afternoon  - Warfarin 5 mg every Sun, Mon, Wed, Fri  - Warfarin 7.5 mg every Tue, Thu, Sat  - Target INR of 2.5 (range 2.0 to 3.0)        CKD (chronic kidney disease), stage III    HYPERPARATHYROIDISM DUE TO RENAL INSUFFICIENCY  ANEMIA OF CHRONIC RENAL FAILURE, STAGE 3    GFR is usually in the 50's in the setting of high blood pressure. Corrected calcium was 10.0 mg/dL.  - Acute renal failure has resolved   - Continue ferrous sulfate, 325 mg qd (per outpatient regimen)          Renovascular hypertension    Home regimen includes fosinopril 40 mg qd, nifedipine 30 mg qd.  - Will hold fosinopril in setting of JIM  - Continue nifedipine  - Will hold fosinopril d/t difficult to manage hyperkalemia            History of gout    Allopurinol 100 mg qd          Left ventricular diastolic dysfunction with preserved systolic function    ACE-inhibitor and furosemide at home. Held in setting of prerenal JIM.             Type 2 diabetes mellitus with diabetic peripheral angiopathy without gangrene    A1c was 5.5. On glimiperide at home.   - Low-dose SSI           Hyperlipidemia    On fenofibrate 200 mg at home and atorvastatin 80 mg qd  - Will continue statin          Gastroesophageal reflux disease without esophagitis    On H2-inhibitor, famotidine at home.   - Will hold in setting of JIM  - Will restart on discharge          Obesity, diabetes, and hypertension syndrome    Pt reports 20 lbs of unintentional weight loss d/t decreased appetite   - Cardiac diet  - Follow up decreased appetite and weight loss in OP setting          Hyperparathyroidism due  to renal insufficiency              Anemia of chronic renal failure, stage 3 (moderate)              Long term current use of anticoagulant therapy              PVD (peripheral vascular disease)                VTE Risk Mitigation         Ordered     warfarin (COUMADIN) tablet 5 mg  Every Sunday     Route:  Oral        03/22/18 0912     warfarin (COUMADIN) tablet 7.5 mg  Every Tues, Thurs, Sat     Route:  Oral        03/22/18 0911     warfarin (COUMADIN) tablet 5 mg  Every Mon, Wed, Fri     Route:  Oral        03/22/18 0912     enoxaparin injection 130 mg  Every 24 hours (non-standard times)     Route:  Subcutaneous        03/23/18 1132              Behram Khan, MD  Department of Hospital Medicine   [facility]

## 2018-03-24 NOTE — PLAN OF CARE
"Pt up in chair with wife at bedside. Denies pain on assessment. Rhinorocket in place securely. No BM yet after Kayexalate. No acute events. WCTM.     BP (!) 119/58 (Patient Position: Sitting)   Pulse 68   Temp 97.4 °F (36.3 °C) (Oral)   Resp 20   Ht 5' 5" (1.651 m)   Wt 86.2 kg (190 lb 0.6 oz)   SpO2 97%   BMI 31.62 kg/m²     "

## 2018-03-24 NOTE — SUBJECTIVE & OBJECTIVE
Interval History: no bleeding from nasal cavity with merocel in place on left    Medications:  Continuous Infusions:  Scheduled Meds:   allopurinol  100 mg Oral Daily    aspirin  81 mg Oral Daily    atorvastatin  80 mg Oral Daily    carvedilol  6.25 mg Oral BID    cephALEXin  500 mg Oral Q12H    clopidogrel  75 mg Oral Daily    enoxaparin  1.5 mg/kg Subcutaneous Q24H    famotidine  20 mg Oral Daily    ferrous sulfate  325 mg Oral Daily    isosorbide mononitrate  120 mg Oral Daily    Lactobacillus rhamnosus GG  1 capsule Oral BID    NIFEdipine  60 mg Oral Daily    warfarin  5 mg Oral Every Mon, Wed, Fri    [START ON 3/25/2018] warfarin  5 mg Oral Every Sun    warfarin  7.5 mg Oral Every Tues, Thurs, Sat     PRN Meds:acetaminophen, dextrose 50%, dextrose 50%, glucagon (human recombinant), glucose, glucose, insulin aspart U-100, magnesium oxide, nitroGLYCERIN, potassium chloride 10%, potassium, sodium phosphates, ramelteon, sodium chloride 0.9%     Review of patient's allergies indicates:  No Known Allergies  Objective:     Vital Signs (24h Range):  Temp:  [97.4 °F (36.3 °C)-98.6 °F (37 °C)] 97.7 °F (36.5 °C)  Pulse:  [58-77] 76  Resp:  [16-20] 16  SpO2:  [93 %-97 %] 96 %  BP: (112-180)/(53-92) 175/77        Lines/Drains/Airways     Peripheral Intravenous Line                 Peripheral IV - Single Lumen 03/23/18 1835 Left Forearm less than 1 day                Physical Exam  NAD aao x 3  No bleeding from nasal cavity or oral cavity  No resp distress    Significant Labs:  All pertinent labs from the last 24 hours have been reviewed.    CBC    Recent Labs  Lab 03/21/18  1316 03/22/18  0717   WBC 6.23 5.74   HGB 11.0* 11.2*   HCT 32.3* 33.0*   MCV 89 90   * 138*     BMP    Recent Labs  Lab 03/22/18  0717 03/23/18  0520 03/23/18  0521 03/24/18  0330   GLU 95 93  --  87    142  --  138   K 4.8 5.4*  --  4.9    108  --  106   CO2 23 24  --  22*   BUN 75* 65*  --  70*   CREATININE 2.3* 1.9*   --  2.0*   CALCIUM 9.8 10.2  --  9.6   PHOS 2.6*  --  2.6* 3.8   MG 1.7  --  1.4* 1.5*     HARLEY    Recent Labs  Lab 03/22/18  0717 03/23/18  0521 03/24/18  0330   INR 1.8* 1.4* 1.4*       Significant Diagnostics:  None

## 2018-03-24 NOTE — PLAN OF CARE
Problem: Patient Care Overview  Goal: Plan of Care Review  Outcome: Ongoing (interventions implemented as appropriate)  Pt AAOx4, afebrile, free of falls. Pt ambulates independently. Pt denies pain, SOB, distress throughout shift. Rhinorocket remains in place to left nare, mild discharge present. Possible removal Monday. No acute changes, WCTM.

## 2018-03-25 LAB
ANION GAP SERPL CALC-SCNC: 10 MMOL/L
BASOPHILS # BLD AUTO: 0.06 K/UL
BASOPHILS NFR BLD: 1 %
BUN SERPL-MCNC: 54 MG/DL
CALCIUM SERPL-MCNC: 9.5 MG/DL
CHLORIDE SERPL-SCNC: 110 MMOL/L
CO2 SERPL-SCNC: 19 MMOL/L
CREAT SERPL-MCNC: 1.6 MG/DL
DIFFERENTIAL METHOD: ABNORMAL
EOSINOPHIL # BLD AUTO: 0.2 K/UL
EOSINOPHIL NFR BLD: 2.6 %
ERYTHROCYTE [DISTWIDTH] IN BLOOD BY AUTOMATED COUNT: 13.3 %
ERYTHROCYTE [DISTWIDTH] IN BLOOD BY AUTOMATED COUNT: 13.3 %
EST. GFR  (AFRICAN AMERICAN): 47.7 ML/MIN/1.73 M^2
EST. GFR  (NON AFRICAN AMERICAN): 41.2 ML/MIN/1.73 M^2
FACT X PPP CHRO-ACNC: 1.52 IU/ML
GLUCOSE SERPL-MCNC: 86 MG/DL
HCT VFR BLD AUTO: 31.3 %
HCT VFR BLD AUTO: 33.5 %
HGB BLD-MCNC: 10.8 G/DL
HGB BLD-MCNC: 11.1 G/DL
IMM GRANULOCYTES # BLD AUTO: 0.01 K/UL
IMM GRANULOCYTES # BLD AUTO: 0.01 K/UL
IMM GRANULOCYTES NFR BLD AUTO: 0.2 %
INR PPP: 1.5
LYMPHOCYTES # BLD AUTO: 2.1 K/UL
LYMPHOCYTES NFR BLD: 34.3 %
MCH RBC QN AUTO: 30.2 PG
MCH RBC QN AUTO: 31.1 PG
MCHC RBC AUTO-ENTMCNC: 33.1 G/DL
MCHC RBC AUTO-ENTMCNC: 34.5 G/DL
MCV RBC AUTO: 90 FL
MCV RBC AUTO: 91 FL
MONOCYTES # BLD AUTO: 0.7 K/UL
MONOCYTES NFR BLD: 11.1 %
NEUTROPHILS # BLD AUTO: 3.1 K/UL
NEUTROPHILS # BLD AUTO: 3.2 K/UL
NEUTROPHILS NFR BLD: 50.8 %
NRBC BLD-RTO: 0 /100 WBC
PLATELET # BLD AUTO: 153 K/UL
PLATELET # BLD AUTO: 161 K/UL
PMV BLD AUTO: 11 FL
PMV BLD AUTO: 11 FL
POTASSIUM SERPL-SCNC: 4.8 MMOL/L
PROTHROMBIN TIME: 14.8 SEC
RBC # BLD AUTO: 3.47 M/UL
RBC # BLD AUTO: 3.68 M/UL
SODIUM SERPL-SCNC: 139 MMOL/L
WBC # BLD AUTO: 5.62 K/UL
WBC # BLD AUTO: 6.04 K/UL

## 2018-03-25 PROCEDURE — 80048 BASIC METABOLIC PNL TOTAL CA: CPT

## 2018-03-25 PROCEDURE — 94761 N-INVAS EAR/PLS OXIMETRY MLT: CPT

## 2018-03-25 PROCEDURE — 99232 SBSQ HOSP IP/OBS MODERATE 35: CPT | Mod: GC,,, | Performed by: INTERNAL MEDICINE

## 2018-03-25 PROCEDURE — 25000003 PHARM REV CODE 250: Performed by: INTERNAL MEDICINE

## 2018-03-25 PROCEDURE — 20600001 HC STEP DOWN PRIVATE ROOM

## 2018-03-25 PROCEDURE — 85520 HEPARIN ASSAY: CPT

## 2018-03-25 PROCEDURE — 85520 HEPARIN ASSAY: CPT | Mod: 91

## 2018-03-25 PROCEDURE — 85027 COMPLETE CBC AUTOMATED: CPT

## 2018-03-25 PROCEDURE — 85610 PROTHROMBIN TIME: CPT

## 2018-03-25 PROCEDURE — 36415 COLL VENOUS BLD VENIPUNCTURE: CPT

## 2018-03-25 PROCEDURE — 63600175 PHARM REV CODE 636 W HCPCS: Performed by: STUDENT IN AN ORGANIZED HEALTH CARE EDUCATION/TRAINING PROGRAM

## 2018-03-25 PROCEDURE — 85025 COMPLETE CBC W/AUTO DIFF WBC: CPT

## 2018-03-25 PROCEDURE — 25000003 PHARM REV CODE 250: Performed by: STUDENT IN AN ORGANIZED HEALTH CARE EDUCATION/TRAINING PROGRAM

## 2018-03-25 RX ORDER — LANOLIN ALCOHOL/MO/W.PET/CERES
400 CREAM (GRAM) TOPICAL ONCE
Status: COMPLETED | OUTPATIENT
Start: 2018-03-25 | End: 2018-03-25

## 2018-03-25 RX ORDER — CARVEDILOL 12.5 MG/1
12.5 TABLET ORAL 2 TIMES DAILY
Status: DISCONTINUED | OUTPATIENT
Start: 2018-03-25 | End: 2018-03-28

## 2018-03-25 RX ORDER — HEPARIN SODIUM,PORCINE/D5W 25000/250
14 INTRAVENOUS SOLUTION INTRAVENOUS CONTINUOUS
Status: DISCONTINUED | OUTPATIENT
Start: 2018-03-25 | End: 2018-03-27

## 2018-03-25 RX ORDER — NIFEDIPINE 30 MG/1
30 TABLET, EXTENDED RELEASE ORAL DAILY
Status: DISCONTINUED | OUTPATIENT
Start: 2018-03-26 | End: 2018-03-28

## 2018-03-25 RX ADMIN — METRONIDAZOLE 500 MG: 500 TABLET ORAL at 08:03

## 2018-03-25 RX ADMIN — CEPHALEXIN 500 MG: 500 CAPSULE ORAL at 09:03

## 2018-03-25 RX ADMIN — FAMOTIDINE 20 MG: 20 TABLET, FILM COATED ORAL at 08:03

## 2018-03-25 RX ADMIN — Medication 1 CAPSULE: at 09:03

## 2018-03-25 RX ADMIN — ISOSORBIDE MONONITRATE 120 MG: 60 TABLET, EXTENDED RELEASE ORAL at 08:03

## 2018-03-25 RX ADMIN — ALLOPURINOL 100 MG: 100 TABLET ORAL at 08:03

## 2018-03-25 RX ADMIN — ASPIRIN 81 MG: 81 TABLET, COATED ORAL at 08:03

## 2018-03-25 RX ADMIN — MAGNESIUM OXIDE TAB 400 MG (241.3 MG ELEMENTAL MG) 400 MG: 400 (241.3 MG) TAB at 06:03

## 2018-03-25 RX ADMIN — NIFEDIPINE 60 MG: 60 TABLET, FILM COATED, EXTENDED RELEASE ORAL at 08:03

## 2018-03-25 RX ADMIN — CARVEDILOL 12.5 MG: 12.5 TABLET, FILM COATED ORAL at 08:03

## 2018-03-25 RX ADMIN — CARVEDILOL 12.5 MG: 12.5 TABLET, FILM COATED ORAL at 09:03

## 2018-03-25 RX ADMIN — ENOXAPARIN SODIUM 130 MG: 150 INJECTION SUBCUTANEOUS at 11:03

## 2018-03-25 RX ADMIN — Medication 1 CAPSULE: at 11:03

## 2018-03-25 RX ADMIN — CLOPIDOGREL BISULFATE 75 MG: 75 TABLET, FILM COATED ORAL at 08:03

## 2018-03-25 RX ADMIN — FERROUS SULFATE TAB EC 325 MG (65 MG FE EQUIVALENT) 325 MG: 325 (65 FE) TABLET DELAYED RESPONSE at 08:03

## 2018-03-25 RX ADMIN — SODIUM BICARBONATE: 84 INJECTION, SOLUTION INTRAVENOUS at 05:03

## 2018-03-25 RX ADMIN — ATORVASTATIN CALCIUM 80 MG: 20 TABLET, FILM COATED ORAL at 08:03

## 2018-03-25 RX ADMIN — HEPARIN SODIUM 17 UNITS/KG/HR: 10000 INJECTION, SOLUTION INTRAVENOUS at 05:03

## 2018-03-25 RX ADMIN — METRONIDAZOLE 500 MG: 500 TABLET ORAL at 09:03

## 2018-03-25 NOTE — PLAN OF CARE
Problem: Patient Care Overview  Goal: Plan of Care Review  Outcome: Ongoing (interventions implemented as appropriate)   03/25/18 1743   Coping/Psychosocial   Plan Of Care Reviewed With patient;spouse       Problem: Renal Failure/Kidney Injury, Acute (Adult)  Intervention: Monitor/Manage Fluid, Acid Base Balance   03/25/18 1743   Nutrition Interventions   Fluid/Electrolyte Management fluids adjusted   Safety Interventions   Medication Review/Management medications reviewed   Positioning   Body Position ambulate in room       Pt resting in bed, VSS, Aox 4, wife at bedside today assisting with shower and ADLs, heparin gtt and fluids initiated, pending procedure in am.  Pt denies pain or further needs, call light within reach.  Will continue to monitor.

## 2018-03-25 NOTE — ASSESSMENT & PLAN NOTE
Bedside preliminary echocardiogram performed by cards fellow: EF 25-30% with inferolateral wall hypokinesis/akinesis. Mid and apical anterolateral wall akinesis. RAP<3, Moderate MR and TR. Official read revealed decrease in EF to 50-55% and hypokinesis in the following areas: mid anteroseptum, apical septum.  - Aspirin, 81 mg PO continue plavix   - BB <70 and treat for goal BP<130/80.   - C/w imdur 120 mg daily   - Transitioned to carvedilol 6.25 mg BID for HTN  - c/w lipitor 80 mg daily  - PET stress resulted as follows:     CONCLUSIONS: ABNORMAL MYOCARDIAL PERFUSION PET STRESS TEST  1. There is a moderate sized mild to moderate ischemia in the base to distal inferolateral wall. This defect comprises 15 % of the left ventricular myocardium.   2. Resting wall motion is physiologic. Stress wall motion is physiologic.   3. LV function is normal at rest and stress.  (normal is >= 51%)  4. The ventricular volumes are normal at rest and stress.   5. The extracardiac distribution of radioactivity is normal.   6. When compared to the previous study from 12/8/16, there is no signficant change.    - Cardiology plans for [facility] on Monday

## 2018-03-25 NOTE — PROGRESS NOTES
[facility]  Cardiology  Progress Note    Patient Name: Dariel Urbina  MRN: [id number]  Admission Date: 3/19/2018  Hospital Length of Stay: 6 days  Code Status: Full Code   Attending Physician: Lamberto Miranda MD   Primary Care Physician: Devon Langston MD  Expected Discharge Date: 3/26/2018  Principal Problem:Acute renal failure superimposed on stage 3 chronic kidney disease    Subjective:     Hospital Course:   Cardiology consulted initially for pt with elevated troponin to 13 with chest pain, no EKG changes, likely representing NSTEMI. Started on medical therapy with plan for further evaluation pending pt's SCr improvement. Chest pain resolved after <30 mins from initial event, troponins decreased after initial peak.     Interval History:   NAEON, no new chest pain, no new EKG changes, trop trending down. Patient walked the floor w/o any chest pain    Review of Systems   Constitution: Negative for chills and fever.   HENT: Negative for congestion.    Cardiovascular: Positive for dyspnea on exertion (has chronic LÓPEZ and has not worsened). Negative for chest pain, irregular heartbeat, leg swelling, orthopnea, palpitations and syncope.   Respiratory: Positive for shortness of breath (mild chronic not worsened). Negative for cough.    Gastrointestinal: Negative for abdominal pain, diarrhea, nausea and vomiting.   Neurological: Negative for dizziness and headaches.     Objective:     Vital Signs (Most Recent):  Temp: 97.7 °F (36.5 °C) (03/25/18 1118)  Pulse: (!) 58 (03/25/18 1118)  Resp: 16 (03/25/18 1118)  BP: (!) 117/56 (03/25/18 1118)  SpO2: 97 % (03/25/18 1118) Vital Signs (24h Range):  Temp:  [97.6 °F (36.4 °C)-98.1 °F (36.7 °C)] 97.7 °F (36.5 °C)  Pulse:  [57-72] 58  Resp:  [16-18] 16  SpO2:  [94 %-98 %] 97 %  BP: (117-172)/(56-79) 117/56     Weight: 83.6 kg (184 lb 4.9 oz)  Body mass index is 30.67 kg/m².     SpO2: 97 %  O2 Device (Oxygen Therapy): room air      Intake/Output Summary (Last  24 hours) at 03/25/18 1149  Last data filed at 03/25/18 0649   Gross per 24 hour   Intake                0 ml   Output             1850 ml   Net            -1850 ml       Lines/Drains/Airways     Peripheral Intravenous Line                 Peripheral IV - Single Lumen 03/23/18 1835 Left Forearm 1 day                Physical Exam   Constitutional: He is oriented to person, place, and time. He appears well-developed and well-nourished.   HENT:   Head: Normocephalic and atraumatic.   Eyes: EOM are normal. Pupils are equal, round, and reactive to light.   Neck: Normal range of motion. Neck supple. No JVD present.   Cardiovascular: Normal rate, regular rhythm and intact distal pulses.    Murmur (holosystolic mumur heard loudes in the 4th and 5th spaces mid-clavicular line) heard.  Pulmonary/Chest: Effort normal and breath sounds normal. He has no wheezes.   Abdominal: Soft. Bowel sounds are normal. There is no tenderness.   Musculoskeletal: Normal range of motion. He exhibits no edema.   Neurological: He is alert and oriented to person, place, and time. He has normal reflexes.   Vitals reviewed.      Significant Labs:   Troponin   No results for input(s): TROPONINI in the last 48 hours.    Significant Imaging: EKG: Sinus with 1st deg AV block and LBBB    Assessment and Plan:     Brief HPI:     NSTEMI (non-ST elevated myocardial infarction)    Patient is a 78 yo male with a past medical hx significant for CAD (s/p CABG in 2002 - LIMA-LAD, SVG-OM2, multiple PCIs), AS (s/p mechanical AVR), HTN, HLD, PAD, T2DM, CKD III here with a NSTEMI superimposed upon his JIM on CKD and hypovolemia secondary to acute diarrhea. Cr is 4.4 and EKG shows no dynamic changes. Not on ACS protocol (not on DAPT) and not heparinized due to supratherapeutic INR. On BB and statin therapy and his antianginal therapy.     2D echo EF 40% without hypo/akinesis.    Plan:  - c/w Aspirin, c/w plavix and lovonox therapeutic doses  - Would BB<70 and treat  for goal BP<130/80  - C/w imdur 120 mg daily and coreg 12.5 mg BID. If patient is still HTNsive in 24 hrs would start bidil (can break up in hydralazine and isordil individual orders)  - c/w lipitor 80 mg daily.  - Will place Interventional cardiology consult for [facility] for tomorrow. Pending Cr for tomorrow AM.    Discussed with Dr. Armas,              VTE Risk Mitigation         Ordered     warfarin (COUMADIN) tablet 5 mg  Every Sunday     Route:  Oral        03/22/18 0912     warfarin (COUMADIN) tablet 7.5 mg  Every Tues, Thurs, Sat     Route:  Oral        03/22/18 0911     warfarin (COUMADIN) tablet 5 mg  Every Mon, Wed, Fri     Route:  Oral        03/22/18 0912     enoxaparin injection 130 mg  Every 24 hours (non-standard times)     Route:  Subcutaneous        03/23/18 0922          Fito Ramirez MD  Cardiology  [facility]

## 2018-03-25 NOTE — PROGRESS NOTES
[facility] Hospital Medicine  Progress Note    Patient Name: Dariel Urbina  MRN: [id number]  Patient Class: IP- Inpatient   Admission Date: 3/19/2018  Length of Stay: 6 days  Attending Physician: Lamberto Miranda MD  Primary Care Provider: Devon Langston MD    [facility] Medicine Team: [facility] 3 Behram Khan, MD    Subjective:     Principal Problem:Acute renal failure superimposed on stage 3 chronic kidney disease    HPI:  Mr Urbina is a 76 yo male with PMH significant for HTN, CKD III, HLD, CAD, DMT2, gout, mechanical aortic valve, diastolic heart failure, and history of left-sided colectomy for diverticulosis who presents with diarrhea of three day duration with sudden onset. Diarrhea is watery nigh on clear without any mucus or blood. Pt c/o greater than 6 episodes of diarrhea per day for the past three days. Associated symptoms include weakness, fatigue and decreased urine output. He has not urinated for the past two days. He denies any abdominal pain or cramps currently. Pt has tried immodium, kaopectate and pepto bismal without any symptomatic benefit. He has never had an episode of diarrhea like this before. Pt cannot attribute this episode of diarrhea to any particular cause.     He denies any recent changes in diet or recent travel. Denies sick contacts, fevers, chills, headache, or confusion. Denies recent antibiotics or hospitalizations. Denies changes to medications. Review of systems was positive for shortness of breath, cough while eating, and decreased urine output. Pt also endorses a decreased appetite over the past two months resulting in an unintentional twenty pound weight loss.     Hospital Course:  No notes on file    Interval History: Doing well this morning. Aware of [facility] procedure tomorrow.     Review of Systems  Objective:     Vital Signs (Most Recent):  Temp: 98.1 °F (36.7 °C) (03/25/18 0531)  Pulse: 68 (03/25/18 0655)  Resp: 16 (03/25/18 0531)  BP: (!) 172/75  (03/25/18 0531)  SpO2: (!) 94 % (03/25/18 0531) Vital Signs (24h Range):  Temp:  [97.6 °F (36.4 °C)-98.1 °F (36.7 °C)] 98.1 °F (36.7 °C)  Pulse:  [57-76] 68  Resp:  [16-18] 16  SpO2:  [94 %-98 %] 94 %  BP: (140-175)/(60-79) 172/75     Weight: 83.6 kg (184 lb 4.9 oz)  Body mass index is 30.67 kg/m².    Intake/Output Summary (Last 24 hours) at 03/25/18 0711  Last data filed at 03/25/18 0649   Gross per 24 hour   Intake                0 ml   Output             1850 ml   Net            -1850 ml      Physical Exam   Constitutional: He is oriented to person, place, and time. He appears well-developed and well-nourished.   HENT:   Head: Normocephalic and atraumatic.   Nasal packing in place   Eyes: Conjunctivae and EOM are normal. Pupils are equal, round, and reactive to light.   Neck: Normal range of motion. Neck supple.   Cardiovascular: Normal rate, regular rhythm, normal heart sounds and intact distal pulses.    No murmur heard.  Pulmonary/Chest: Effort normal and breath sounds normal. No respiratory distress. He has no wheezes.   Abdominal: Soft. Bowel sounds are normal. He exhibits no distension and no mass. There is no tenderness. There is no guarding, no CVA tenderness, no tenderness at McBurney's point and negative Dove's sign.   Lymphadenopathy:     He has no cervical adenopathy.   Neurological: He is alert and oriented to person, place, and time. GCS eye subscore is 4. GCS verbal subscore is 5. GCS motor subscore is 6.   Skin: Skin is warm and dry.   Vitals reviewed.      Significant Labs: All pertinent labs within the past 24 hours have been reviewed.    Significant Imaging: I have reviewed all pertinent imaging results/findings within the past 24 hours.    Assessment/Plan:      * Acute renal failure superimposed on stage 3 chronic kidney disease    Improving now s/p IV fluids and resolution of diarrhea. Will continue to monitor.        NSTEMI (non-ST elevated myocardial infarction)    Bedside preliminary  echocardiogram performed by cards fellow: EF 25-30% with inferolateral wall hypokinesis/akinesis. Mid and apical anterolateral wall akinesis. RAP<3, Moderate MR and TR. Official read revealed decrease in EF to 50-55% and hypokinesis in the following areas: mid anteroseptum, apical septum.  - Aspirin, 81 mg PO continue plavix   - BB <70 and treat for goal BP<130/80.   - C/w imdur 120 mg daily   - Transitioned to carvedilol 6.25 mg BID for HTN  - c/w lipitor 80 mg daily  - PET stress resulted as follows:     CONCLUSIONS: ABNORMAL MYOCARDIAL PERFUSION PET STRESS TEST  1. There is a moderate sized mild to moderate ischemia in the base to distal inferolateral wall. This defect comprises 15 % of the left ventricular myocardium.   2. Resting wall motion is physiologic. Stress wall motion is physiologic.   3. LV function is normal at rest and stress.  (normal is >= 51%)  4. The ventricular volumes are normal at rest and stress.   5. The extracardiac distribution of radioactivity is normal.   6. When compared to the previous study from 12/8/16, there is no signficant change.    - Cardiology plans for [facility] on Monday           Bleeding nose    Seen by ENT  - Cephalexin 500 mg BID   - Nasal saline to bilateral nares q 4 hrs   - Afrin nasal spray 2 sprays q 4 hrs x 3 days  - Avoid nasal cannula; recommend humidified 02   - ENT will follow and remove pack when appropriate, likely Monday         Metabolic acidosis with normal anion gap and bicarbonate losses    Associated with severe diarrhea and kidney failure now improved        CAD (coronary atherosclerotic disease)    PERIPHERAL VASCULAR DISEASE    Takes high-intensity statin. Also has nitroglycerin 0.4 mg PRN. Denies every requiring nitro. Nil aspirin in the outpatient setting.   - Aspirin 81 mg  - Plavix 300 then 75 mg  - Atorvastatin 80 mg QD  - Isosorbide mononitrate 120 mg qd              H/O mechanical aortic valve replacement    LONG TERM CURRENT USE OF ANTICOAGULATION  THERAPY    Pt on anticoagulation at home with daily warfarin. INR was 4.4 on admission. Has decreased PO intake for the past two months. INR 1.4 today. Will re-initiate warfarin.   - One dose this AM with additional dose in the afternoon  - Warfarin 5 mg every Sun, Mon, Wed, Fri  - Warfarin 7.5 mg every Tue, Thu, Sat  - Target INR of 2.5 (range 2.0 to 3.0)        CKD (chronic kidney disease), stage III    HYPERPARATHYROIDISM DUE TO RENAL INSUFFICIENCY  ANEMIA OF CHRONIC RENAL FAILURE, STAGE 3    GFR is usually in the 50's in the setting of high blood pressure. Corrected calcium was 10.0 mg/dL.  - Acute renal failure has resolved   - Continue ferrous sulfate, 325 mg qd (per outpatient regimen)          Renovascular hypertension    Home regimen includes fosinopril 40 mg qd, nifedipine 30 mg qd.  - Will hold fosinopril in setting of JIM  - Continue nifedipine  - Will hold fosinopril d/t difficult to manage hyperkalemia            History of gout    Allopurinol 100 mg qd          Left ventricular diastolic dysfunction with preserved systolic function    ACE-inhibitor and furosemide at home. Held in setting of prerenal JIM.             Type 2 diabetes mellitus with diabetic peripheral angiopathy without gangrene    A1c was 5.5. On glimiperide at home.   - Low-dose SSI           Hyperlipidemia    On fenofibrate 200 mg at home and atorvastatin 80 mg qd  - Will continue statin          Gastroesophageal reflux disease without esophagitis    On H2-inhibitor, famotidine at home.   - Will hold in setting of JIM  - Will restart on discharge          Obesity, diabetes, and hypertension syndrome    Pt reports 20 lbs of unintentional weight loss d/t decreased appetite   - Cardiac diet  - Follow up decreased appetite and weight loss in OP setting          Hyperparathyroidism due to renal insufficiency              Anemia of chronic renal failure, stage 3 (moderate)              Long term current use of anticoagulant therapy               PVD (peripheral vascular disease)                VTE Risk Mitigation         Ordered     warfarin (COUMADIN) tablet 5 mg  Every Sunday     Route:  Oral        03/22/18 0912     warfarin (COUMADIN) tablet 7.5 mg  Every Tues, Thurs, Sat     Route:  Oral        03/22/18 0911     warfarin (COUMADIN) tablet 5 mg  Every Mon, Wed, Fri     Route:  Oral        03/22/18 0912     enoxaparin injection 130 mg  Every 24 hours (non-standard times)     Route:  Subcutaneous        03/23/18 1132              Behram Khan, MD  Department of Hospital Medicine   [facility]

## 2018-03-25 NOTE — HOSPITAL COURSE
Mr Urbina is a 76 yo male who presented for diarrhea resulting in prerenal acute kidney injury and non-anion gap metabolic acidosis. He was treated for both with IV hydration, initially with lactated ringer's to avoid superimposing hyperchloremic metabolic acidosis, however he was then noted to have hyperkalemia and thus was transitioned to normal saline. Hyperkalemia was treated with kayexelate and shifting. During treatment of hyperkalemia, pt developed chest pain that was evaluated with troponin and EKG. Overnight, his troponinemia worsened enough to raise concern for NSTEMI. He was evaluated with PET stress test which revealed inferolateral wall ischemia. He was planned for inpatient LHC for coronary evaluation on 3/26, however this was postponed to 3/27. LHC revealed two vessel disease, which was not intervened. He was discharged home on 3/28 with instructions to follow up with cardiology as an outpatient in one month for consideration of further PCI. He was discharged home with lovenox injections to bridge to warfarin.

## 2018-03-25 NOTE — HPI
Patient is a 78 yo male with a past medical hx significant for CAD (s/p CABG in 2002 - LIMA-LAD, SVG-OM2, multiple PCIs, PCI to RCA 2003, PCI to LCx x 2 in 92'), AS (s/p mechanical AVR in 2003), HTN, HLD, PAD, T2DM, CKD III presented to the [facility] on 03/19/2018 with a hx of watery diarrhea for the last three days and profound JIM on CKD as indicated by his labwork. After admission, cardiology consulted for chest pain which occurred while he was getting his retroperitoneal US. Patient's cp spontaneously resolved and his first troponin came back at 0.042. It has since trended upwards to 13 and now has downtrended. His EKG's showed his His EKG showed his pre-existing LBBB and NSR with 1st AV block. He was placed on DAPT and due to his INR being supratherapeutic he was not initiated on anti-coagulation. He was treated for his JIM with hydration and IVF and his diarrhea resolved which corrected his non-gap metabolic acidosis.      On this admission, the patient's HR and BP were treated with GDMT. Coreg 12.5 mg BID was initiated and ACE (patient takes fosinopril) was held due to JIM. PET stress was obtained on this admission and it showed large ischemic area in the distribution of the previous bypass SVG to OM2. His 2D echo here showed low normal EF 50% and wall motion abnormalities (apex, apical septum).     Interventional cardiology consulted for [facility].

## 2018-03-25 NOTE — PLAN OF CARE
Problem: Patient Care Overview  Goal: Plan of Care Review  Outcome: Ongoing (interventions implemented as appropriate)  AAOx4, Pt denies pain or discomfort. Rhinorocket in left nare in place with minimal clear drainage.BP closely monitored. Adequate urine output documented. Ambulates independently, safety maintained.

## 2018-03-25 NOTE — ASSESSMENT & PLAN NOTE
Patient is a 78 yo male with a past medical hx significant for CAD (s/p CABG in 2002 - LIMA-LAD, SVG-OM2, multiple PCIs), AS (s/p mechanical AVR), HTN, HLD, PAD, T2DM, CKD III here with a NSTEMI superimposed upon his JIM on CKD and hypovolemia secondary to acute diarrhea. Cr is 4.4 and EKG shows no dynamic changes. Not on ACS protocol (not on DAPT) and not heparinized due to supratherapeutic INR. On BB and statin therapy and his antianginal therapy.     2D echo EF 40% without hypo/akinesis.    Plan:  - c/w Aspirin, c/w plavix and lovonox therapeutic doses  - Would BB<70 and treat for goal BP<130/80  - C/w imdur 120 mg daily and coreg 12.5 mg BID. If patient is still HTNsive in 24 hrs would start bidil (can break up in hydralazine and isordil individual orders)  - c/w lipitor 80 mg daily.  - Will place Interventional cardiology consult for [facility] for tomorrow. Pending Cr for tomorrow AM.    Discussed with Dr. Armas,

## 2018-03-25 NOTE — SUBJECTIVE & OBJECTIVE
Past Medical History:   Diagnosis Date    Angina, class III 9/17/2014    Bilateral carotid artery disease 2/9/2017    CAD (coronary atherosclerotic disease) 9/11/2012    Cataract     Chronic kidney disease     Claudication of left lower extremity 9/17/2014    DM (diabetes mellitus) 9/26/2012    History of gout 9/26/2012    Hyperlipidemia     Hypertension     Mechanical heart valve present     NSTEMI (non-ST elevated myocardial infarction) 3/21/2018       Past Surgical History:   Procedure Laterality Date    CARDIAC CATHETERIZATION      CARDIAC VALVE REPLACEMENT      CARDIAC VALVE SURGERY      COLON SURGERY      COLONOSCOPY N/A 3/31/2017    Procedure: COLONOSCOPY;  Surgeon: Bruno Raymond MD;  Location: [facility] ([address] [place] [address]);  Service: Endoscopy;  Laterality: N/A;  Patient's wife requesting date.    CORONARY ANGIOPLASTY      CORONARY ARTERY BYPASS GRAFT      HERNIA REPAIR      SPINE SURGERY      VASECTOMY         Review of patient's allergies indicates:  No Known Allergies    PTA Medications   Medication Sig    allopurinol (ZYLOPRIM) 100 MG tablet TAKE 1 TABLET ONE TIME DAILY    atorvastatin (LIPITOR) 80 MG tablet TAKE 1 TABLET ONE TIME DAILY    clotrimazole-betamethasone 1-0.05% (LOTRISONE) cream Apply topically once daily. in between the 4th and 5th toes left foot.    fenofibrate micronized (LOFIBRA) 200 MG Cap Take 200 mg by mouth daily with breakfast.    ferrous sulfate 325 (65 FE) MG EC tablet Take 1 tablet (325 mg total) by mouth once daily.    fosinopril (MONOPRIL) 40 MG tablet TAKE 1 TABLET ONE TIME DAILY    furosemide (LASIX) 20 MG tablet TAKE 1 TABLET ONE TIME DAILY    glimepiride (AMARYL) 1 MG tablet Take 1 tablet (1 mg total) by mouth every morning.    isosorbide mononitrate (IMDUR) 120 MG 24 hr tablet Take 1 tablet (120 mg total) by mouth once daily.    loperamide (IMODIUM) 2 mg capsule Take 2 mg by mouth 4 (four) times daily as needed for Diarrhea.    metoprolol  tartrate (LOPRESSOR) 50 MG tablet Take 1 tablet (50 mg total) by mouth once daily.    nifedipine (PROCARDIA-XL) 30 MG (OSM) 24 hr tablet Take 1 tablet (30 mg total) by mouth once daily.    nitroGLYCERIN (NITROSTAT) 0.4 MG SL tablet Place 1 tablet (0.4 mg total) under the tongue every 5 (five) minutes as needed. As needed for chest pain    omega-3 acid ethyl esters (LOVAZA) 1 gram capsule Take 2 capsules (2 g total) by mouth 2 (two) times daily.    ranitidine (ZANTAC) 300 MG tablet Take 1 tablet (300 mg total) by mouth every evening.    warfarin (COUMADIN) 5 MG tablet Take 1 tablet (5 mg total) by mouth Daily. Current Dose (7.5 mg on Mon, Wed, Fri; 5 mg all other days)     Family History     Problem Relation (Age of Onset)    Alcohol abuse Father    Diabetes Brother    Heart disease Mother, Father, Brother, Sister    Heart failure Mother, Father, Brother    No Known Problems Sister, Maternal Grandmother, Maternal Grandfather, Paternal Grandmother, Paternal Grandfather, Maternal Aunt, Maternal Uncle, Paternal Aunt, Paternal Uncle        Social History Main Topics    Smoking status: Former Smoker     Packs/day: 1.00     Years: 20.00     Quit date: 9/11/1980    Smokeless tobacco: Never Used    Alcohol use No    Drug use: No    Sexual activity: No     Review of Systems   Constitution: Negative for chills and fever.   HENT: Negative for congestion.    Cardiovascular: Positive for dyspnea on exertion (has chronic LÓPEZ and has not worsened). Negative for chest pain, irregular heartbeat, leg swelling, orthopnea, palpitations and syncope.   Respiratory: Positive for shortness of breath (mild chronic not worsened). Negative for cough.    Gastrointestinal: Negative for abdominal pain, diarrhea, nausea and vomiting.   Neurological: Negative for dizziness and headaches.     Objective:     Vital Signs (Most Recent):  Temp: 97.7 °F (36.5 °C) (03/25/18 1118)  Pulse: 86 (03/25/18 1200)  Resp: 16 (03/25/18 1200)  BP: (!)  117/56 (03/25/18 1118)  SpO2: 96 % (03/25/18 1200) Vital Signs (24h Range):  Temp:  [97.6 °F (36.4 °C)-98.1 °F (36.7 °C)] 97.7 °F (36.5 °C)  Pulse:  [57-86] 86  Resp:  [16-18] 16  SpO2:  [94 %-98 %] 96 %  BP: (117-172)/(56-79) 117/56     Weight: 83.6 kg (184 lb 4.9 oz)  Body mass index is 30.67 kg/m².    SpO2: 96 %  O2 Device (Oxygen Therapy): room air      Intake/Output Summary (Last 24 hours) at 03/25/18 1558  Last data filed at 03/25/18 0649   Gross per 24 hour   Intake                0 ml   Output             1850 ml   Net            -1850 ml       Lines/Drains/Airways     Peripheral Intravenous Line                 Peripheral IV - Single Lumen 03/23/18 1835 Left Forearm 1 day                Physical Exam   Constitutional: He is oriented to person, place, and time. He appears well-developed and well-nourished.   HENT:   Head: Normocephalic and atraumatic.   Eyes: EOM are normal. Pupils are equal, round, and reactive to light.   Neck: Normal range of motion. Neck supple. No JVD present.   Cardiovascular: Normal rate, regular rhythm and intact distal pulses.    Murmur (holosystolic mumur heard loudes in the 4th and 5th spaces mid-clavicular line) heard.  Pulmonary/Chest: Effort normal and breath sounds normal. He has no wheezes.   Abdominal: Soft. Bowel sounds are normal. There is no tenderness.   Musculoskeletal: Normal range of motion. He exhibits no edema.   Neurological: He is alert and oriented to person, place, and time. He has normal reflexes.   Vitals reviewed.      Significant Labs:   CMP   Recent Labs  Lab 03/24/18  0330 03/25/18  0347    139   K 4.9 4.8    110   CO2 22* 19*   GLU 87 86   BUN 70* 54*   CREATININE 2.0* 1.6*   CALCIUM 9.6 9.5   ANIONGAP 10 10   ESTGFRAFRICA 36.4* 47.7*   EGFRNONAA 31.5* 41.2*   , CBC   Recent Labs  Lab 03/25/18  0849   WBC 5.62   HGB 11.1*   HCT 33.5*      , INR   Recent Labs  Lab 03/24/18  0330 03/25/18  0346   INR 1.4* 1.5*   ,   Pathology Results   (Last 10 years)    None      , Troponin No results for input(s): TROPONINI in the last 48 hours. and All pertinent lab results from the last 24 hours have been reviewed.    Significant Imaging: Echocardiogram:   2D echo with color flow doppler:   Results for orders placed or performed during the hospital encounter of 03/19/18   2D echo with color flow doppler   Result Value Ref Range    EF 50 55 - 65    Mitral Valve Regurgitation MILD     Diastolic Dysfunction No     Mitral Valve Mobility NORMAL     and X-Ray: CXR: X-Ray Chest 1 View (CXR):   Results for orders placed or performed during the hospital encounter of 03/19/18   X-Ray Chest 1 View    Narrative    EXAMINATION:  XR CHEST 1 VIEW    CLINICAL HISTORY:  SOB;    TECHNIQUE:  Single frontal view of the chest was performed.    COMPARISON:  03/22/2003    FINDINGS:  Post-operative changes are again identified in the thorax.  Heart size and pulmonary vascularity are within normal limits.  Lungs are well expanded and clear.  No pleural fluid or pneumothorax.  Degenerative changes involving the thoracic spine and both AC joints.      Impression    No active cardiopulmonary disease and no detrimental interval change      Electronically signed by: Ayad Baptiste MD  Date:    03/20/2018  Time:    15:41

## 2018-03-25 NOTE — ASSESSMENT & PLAN NOTE
LONG TERM CURRENT USE OF ANTICOAGULATION THERAPY    Pt on anticoagulation at home with daily warfarin. INR was 4.4 on admission. Has decreased PO intake for the past two months. INR 1.4 today. Will re-initiate warfarin.   - One dose this AM with additional dose in the afternoon  - Warfarin 5 mg every Sun, Mon, Wed, Fri  - Warfarin 7.5 mg every Tue, Thu, Sat  - Target INR of 2.5 (range 2.0 to 3.0)

## 2018-03-25 NOTE — SUBJECTIVE & OBJECTIVE
Interval History: Doing well this morning. Aware of [facility] procedure tomorrow.     Review of Systems  Objective:     Vital Signs (Most Recent):  Temp: 98.1 °F (36.7 °C) (03/25/18 0531)  Pulse: 68 (03/25/18 0655)  Resp: 16 (03/25/18 0531)  BP: (!) 172/75 (03/25/18 0531)  SpO2: (!) 94 % (03/25/18 0531) Vital Signs (24h Range):  Temp:  [97.6 °F (36.4 °C)-98.1 °F (36.7 °C)] 98.1 °F (36.7 °C)  Pulse:  [57-76] 68  Resp:  [16-18] 16  SpO2:  [94 %-98 %] 94 %  BP: (140-175)/(60-79) 172/75     Weight: 83.6 kg (184 lb 4.9 oz)  Body mass index is 30.67 kg/m².    Intake/Output Summary (Last 24 hours) at 03/25/18 0711  Last data filed at 03/25/18 0649   Gross per 24 hour   Intake                0 ml   Output             1850 ml   Net            -1850 ml      Physical Exam   Constitutional: He is oriented to person, place, and time. He appears well-developed and well-nourished.   HENT:   Head: Normocephalic and atraumatic.   Nasal packing in place   Eyes: Conjunctivae and EOM are normal. Pupils are equal, round, and reactive to light.   Neck: Normal range of motion. Neck supple.   Cardiovascular: Normal rate, regular rhythm, normal heart sounds and intact distal pulses.    No murmur heard.  Pulmonary/Chest: Effort normal and breath sounds normal. No respiratory distress. He has no wheezes.   Abdominal: Soft. Bowel sounds are normal. He exhibits no distension and no mass. There is no tenderness. There is no guarding, no CVA tenderness, no tenderness at McBurney's point and negative Dove's sign.   Lymphadenopathy:     He has no cervical adenopathy.   Neurological: He is alert and oriented to person, place, and time. GCS eye subscore is 4. GCS verbal subscore is 5. GCS motor subscore is 6.   Skin: Skin is warm and dry.   Vitals reviewed.      Significant Labs: All pertinent labs within the past 24 hours have been reviewed.    Significant Imaging: I have reviewed all pertinent imaging results/findings within the past 24 hours.

## 2018-03-25 NOTE — ASSESSMENT & PLAN NOTE
Patient is a 76 yo male with a past medical hx significant for CAD (s/p CABG in 2002 - LIMA-LAD, SVG-OM2, multiple PCIs), AS (s/p mechanical AVR), HTN, HLD, PAD, T2DM, CKD III here with a NSTEMI superimposed upon his SWAPNIL on CKD and hypovolemia secondary to acute diarrhea. His Swapnil has stablized and needs better BP control. Consented for LHC however SOFYA risk is 27% and there is a 1% risk of HD.     Plan:  - Consented and made NPO.   - Now on heparin gtt  -The risks, benefits & alternatives of the procedure were explained to the patient. Staff will discuss with patient    -The risks of coronary angiography include but are not limited to:  Bleeding, infection, heart rhythm abnormalities, allergic reactions, kidney injury, stroke and death.    -Should stenting be indicated, the patient has agreed to dual anti-platelet therapy for 1-consecutive year with a drug-eluting stent and a minimum of 1-month with the use of a bare metal stent.    -The risks of moderate sedation include hypotension, respiratory depression, arrhythmias, bronchospasm, & death.    -Informed consent was obtained & the patient is agreeable to proceed with the procedure.  -This patient was discussed with the attending interventional cardiologist who agrees with the above assessment & plan.

## 2018-03-25 NOTE — ASSESSMENT & PLAN NOTE
Assessment: 76 y/o male with multiple medical co-morbidities with left-sided epistaxis. He is currently on aspirin, plavix, and coumadin. Pack removed at bedside today      Plan:   - may stop anti-staph antibiotics   - Nasal saline to bilateral nares q 4 hrs   - Avoid nasal cannula; recommend humidified 02   - avoid nasal trauma, sneeze with mouth open, no nose blowing x 2 weeks   - please contact ENT team with any further questions

## 2018-03-25 NOTE — SUBJECTIVE & OBJECTIVE
Interval History:   NAEON, no new chest pain, no new EKG changes, trop trending down. Patient walked the floor w/o any chest pain    Review of Systems   Constitution: Negative for chills and fever.   HENT: Negative for congestion.    Cardiovascular: Positive for dyspnea on exertion (has chronic LÓPEZ and has not worsened). Negative for chest pain, irregular heartbeat, leg swelling, orthopnea, palpitations and syncope.   Respiratory: Positive for shortness of breath (mild chronic not worsened). Negative for cough.    Gastrointestinal: Negative for abdominal pain, diarrhea, nausea and vomiting.   Neurological: Negative for dizziness and headaches.     Objective:     Vital Signs (Most Recent):  Temp: 97.7 °F (36.5 °C) (03/25/18 1118)  Pulse: (!) 58 (03/25/18 1118)  Resp: 16 (03/25/18 1118)  BP: (!) 117/56 (03/25/18 1118)  SpO2: 97 % (03/25/18 1118) Vital Signs (24h Range):  Temp:  [97.6 °F (36.4 °C)-98.1 °F (36.7 °C)] 97.7 °F (36.5 °C)  Pulse:  [57-72] 58  Resp:  [16-18] 16  SpO2:  [94 %-98 %] 97 %  BP: (117-172)/(56-79) 117/56     Weight: 83.6 kg (184 lb 4.9 oz)  Body mass index is 30.67 kg/m².     SpO2: 97 %  O2 Device (Oxygen Therapy): room air      Intake/Output Summary (Last 24 hours) at 03/25/18 1149  Last data filed at 03/25/18 0649   Gross per 24 hour   Intake                0 ml   Output             1850 ml   Net            -1850 ml       Lines/Drains/Airways     Peripheral Intravenous Line                 Peripheral IV - Single Lumen 03/23/18 1835 Left Forearm 1 day                Physical Exam   Constitutional: He is oriented to person, place, and time. He appears well-developed and well-nourished.   HENT:   Head: Normocephalic and atraumatic.   Eyes: EOM are normal. Pupils are equal, round, and reactive to light.   Neck: Normal range of motion. Neck supple. No JVD present.   Cardiovascular: Normal rate, regular rhythm and intact distal pulses.    Murmur (holosystolic mumur heard loudes in the 4th and 5th  spaces mid-clavicular line) heard.  Pulmonary/Chest: Effort normal and breath sounds normal. He has no wheezes.   Abdominal: Soft. Bowel sounds are normal. There is no tenderness.   Musculoskeletal: Normal range of motion. He exhibits no edema.   Neurological: He is alert and oriented to person, place, and time. He has normal reflexes.   Vitals reviewed.      Significant Labs:   Troponin   No results for input(s): TROPONINI in the last 48 hours.    Significant Imaging: EKG: Sinus with 1st deg AV block and LBBB

## 2018-03-25 NOTE — MEDICAL/APP STUDENT
Progress Note  Hospital Medicine    Patient Name: Dariel Urbina  YOB: 1940    Admit Date: 3/19/2018                     LOS: 6    SUBJECTIVE:     Reason for Admission:  Acute renal failure superimposed on stage 3 chronic kidney disease  See H&P for detailed presentating history and ROS.       Interval history: Pt is AAOx4. Ambulates independently. Pt denies chest pain, SOB, and fatigue. Pt slept through the night and is in no apparent distress.      Review of Systems   Constitution: Negative for chills and fever.   HENT: Negative for congestion.    Cardiovascular: Positive for dyspnea on exertion. Negative for chest pain, irregular heartbeat, leg swelling, orthopnea, palpitations and syncope.   Respiratory: Pt denies SOB. Negative for cough.    Gastrointestinal: Negative for diarrhea. Had no BM overnight. Urination is normal  Neurological: Negative for dizziness and headaches.     OBJECTIVE:     Vital Signs Range (Last 24H):  Temp:  [97.6 °F (36.4 °C)-98.1 °F (36.7 °C)]   Pulse:  [57-86]   Resp:  [16-18]   BP: (117-172)/(56-79)   SpO2:  [94 %-98 %] Body mass index is 30.67 kg/m².  Wt Readings from Last 1 Encounters:   03/25/18 0527 83.6 kg (184 lb 4.9 oz)   03/24/18 0507 83.9 kg (184 lb 15.5 oz)   03/23/18 0318 86.2 kg (190 lb 0.6 oz)   03/22/18 0428 85.3 kg (188 lb 0.8 oz)   03/19/18 2303 85.3 kg (188 lb 0.8 oz)   03/19/18 1432 84.4 kg (186 lb)   03/19/18 0510 84.4 kg (186 lb)       I & O (Last 24H):  Intake/Output Summary (Last 24 hours) at 03/25/18 1444  Last data filed at 03/25/18 0649   Gross per 24 hour   Intake                0 ml   Output             1850 ml   Net            -1850 ml     Physical Exam:  Dried blood present around left nostril.   Cardio- Aortic mechanical valve click is present.   Resp- lungs are clear to ascultation  GI- No pain, guarding or rebound tenderness    Diagnostic Results:  Lab Results   Component Value Date    WBC 5.62 03/25/2018    HGB 11.1 (L) 03/25/2018     HCT 33.5 (L) 03/25/2018    MCV 91 03/25/2018     03/25/2018       Recent Labs  Lab 03/25/18  0347   GLU 86      K 4.8      CO2 19*   BUN 54*   CREATININE 1.6*   CALCIUM 9.5     Lab Results   Component Value Date    INR 1.5 (H) 03/25/2018    INR 1.4 (H) 03/24/2018    INR 1.4 (H) 03/23/2018     Lab Results   Component Value Date    HGBA1C 5.5 03/19/2018     No results for input(s): POCTGLUCOSE in the last 72 hours.    ASSESSMENT/PLAN:     Active Hospital Problems    Diagnosis  POA    *Acute renal failure superimposed on stage 3 chronic kidney disease [N17.9, N18.3]  Yes    NSTEMI (non-ST elevated myocardial infarction) [I21.4]  Yes    Bleeding nose [R04.0]  Yes    Metabolic acidosis with normal anion gap and bicarbonate losses [E87.2]  Yes    Gastroesophageal reflux disease without esophagitis [K21.9]  Yes    Obesity, diabetes, and hypertension syndrome [E11.9, I10, E66.9]  Yes    Hyperparathyroidism due to renal insufficiency [N25.81]  Yes    CKD (chronic kidney disease), stage III [N18.3]  Yes    Anemia of chronic renal failure, stage 3 (moderate) [N18.3, D63.1]  Yes    Left ventricular diastolic dysfunction with preserved systolic function [I51.9]  Yes    Type 2 diabetes mellitus with diabetic peripheral angiopathy without gangrene [E11.51]  Yes    H/O mechanical aortic valve replacement [Z95.2]  Not Applicable    History of gout [Z87.39]  Yes    Long term current use of anticoagulant therapy [Z79.01]  Not Applicable    Hyperlipidemia [E78.5]  Yes    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]  Yes    PVD (peripheral vascular disease) [I73.9]  Yes    CAD (coronary atherosclerotic disease) [I25.10]  Yes      Resolved Hospital Problems    Diagnosis Date Resolved POA    Acute hyperkalemia [E87.5] 03/21/2018 No    Severe diarrhea [K52.9] 03/22/2018 Yes       Pt is a 76 yo male with CAD, HTN, CKDIII, and aortic valve replacement in 1993, presented to the ED with JIM and had an NSTEMI in  hospital     Problems Addressed Today:     JIM  -kidney function is improving.     NSTEMI-  - Waiting for pt kidney function to improve to receive a cardiac catheterization.  -Given an increase dose of carvedilol to decrease Bp. Debated using hydralazine, but was worried about rebound increase BP.     AS  -Pt INR has been at 1.4 since yesterday. Pt was given an extra dose of warfarin. We should monitor INR and get above 2.     Nosebleed-  -Merocel pack will be removed Monday  -keflex will be continued    DISCHARGE PLANNING:    TIME SPENT: I spent ***minutes evaluating, treating, counseling, and coordinating care for the patient.    Signing Physician:  Jessica Connors

## 2018-03-26 LAB
ANION GAP SERPL CALC-SCNC: 9 MMOL/L
BASOPHILS # BLD AUTO: 0.07 K/UL
BASOPHILS NFR BLD: 1.3 %
BUN SERPL-MCNC: 56 MG/DL
CALCIUM SERPL-MCNC: 9.7 MG/DL
CHLORIDE SERPL-SCNC: 107 MMOL/L
CO2 SERPL-SCNC: 23 MMOL/L
CREAT SERPL-MCNC: 1.7 MG/DL
DIFFERENTIAL METHOD: ABNORMAL
EOSINOPHIL # BLD AUTO: 0.2 K/UL
EOSINOPHIL NFR BLD: 3.4 %
ERYTHROCYTE [DISTWIDTH] IN BLOOD BY AUTOMATED COUNT: 13.2 %
EST. GFR  (AFRICAN AMERICAN): 44.3 ML/MIN/1.73 M^2
EST. GFR  (NON AFRICAN AMERICAN): 38.3 ML/MIN/1.73 M^2
FACT X PPP CHRO-ACNC: >1.86 IU/ML
GLUCOSE SERPL-MCNC: 100 MG/DL
HCT VFR BLD AUTO: 32.9 %
HGB BLD-MCNC: 11 G/DL
IMM GRANULOCYTES # BLD AUTO: 0.02 K/UL
IMM GRANULOCYTES NFR BLD AUTO: 0.4 %
INR PPP: 1.7
LYMPHOCYTES # BLD AUTO: 2 K/UL
LYMPHOCYTES NFR BLD: 36 %
MCH RBC QN AUTO: 29.9 PG
MCHC RBC AUTO-ENTMCNC: 33.4 G/DL
MCV RBC AUTO: 89 FL
MONOCYTES # BLD AUTO: 0.6 K/UL
MONOCYTES NFR BLD: 10.8 %
NEUTROPHILS # BLD AUTO: 2.7 K/UL
NEUTROPHILS NFR BLD: 48.1 %
NRBC BLD-RTO: 0 /100 WBC
PLATELET # BLD AUTO: 160 K/UL
PMV BLD AUTO: 10.6 FL
POTASSIUM SERPL-SCNC: 4.8 MMOL/L
PROTHROMBIN TIME: 17 SEC
RBC # BLD AUTO: 3.68 M/UL
SODIUM SERPL-SCNC: 139 MMOL/L
WBC # BLD AUTO: 5.56 K/UL

## 2018-03-26 PROCEDURE — 25000003 PHARM REV CODE 250: Performed by: STUDENT IN AN ORGANIZED HEALTH CARE EDUCATION/TRAINING PROGRAM

## 2018-03-26 PROCEDURE — 85520 HEPARIN ASSAY: CPT | Mod: 91

## 2018-03-26 PROCEDURE — 80048 BASIC METABOLIC PNL TOTAL CA: CPT

## 2018-03-26 PROCEDURE — 85025 COMPLETE CBC W/AUTO DIFF WBC: CPT

## 2018-03-26 PROCEDURE — 36415 COLL VENOUS BLD VENIPUNCTURE: CPT

## 2018-03-26 PROCEDURE — 63600175 PHARM REV CODE 636 W HCPCS: Performed by: INTERNAL MEDICINE

## 2018-03-26 PROCEDURE — 85610 PROTHROMBIN TIME: CPT

## 2018-03-26 PROCEDURE — 20600001 HC STEP DOWN PRIVATE ROOM

## 2018-03-26 PROCEDURE — 25000003 PHARM REV CODE 250: Performed by: INTERNAL MEDICINE

## 2018-03-26 PROCEDURE — 99233 SBSQ HOSP IP/OBS HIGH 50: CPT | Mod: GC,,, | Performed by: INTERNAL MEDICINE

## 2018-03-26 RX ORDER — POLYETHYLENE GLYCOL 3350 17 G/17G
17 POWDER, FOR SOLUTION ORAL DAILY
Status: DISCONTINUED | OUTPATIENT
Start: 2018-03-26 | End: 2018-03-28 | Stop reason: HOSPADM

## 2018-03-26 RX ORDER — SODIUM CHLORIDE 9 MG/ML
INJECTION, SOLUTION INTRAVENOUS CONTINUOUS
Status: ACTIVE | OUTPATIENT
Start: 2018-03-26 | End: 2018-03-27

## 2018-03-26 RX ADMIN — POLYETHYLENE GLYCOL 3350 17 G: 17 POWDER, FOR SOLUTION ORAL at 10:03

## 2018-03-26 RX ADMIN — NIFEDIPINE 30 MG: 30 TABLET, FILM COATED, EXTENDED RELEASE ORAL at 08:03

## 2018-03-26 RX ADMIN — FERROUS SULFATE TAB EC 325 MG (65 MG FE EQUIVALENT) 325 MG: 325 (65 FE) TABLET DELAYED RESPONSE at 08:03

## 2018-03-26 RX ADMIN — ALLOPURINOL 100 MG: 100 TABLET ORAL at 08:03

## 2018-03-26 RX ADMIN — Medication 1 CAPSULE: at 08:03

## 2018-03-26 RX ADMIN — CARVEDILOL 12.5 MG: 12.5 TABLET, FILM COATED ORAL at 08:03

## 2018-03-26 RX ADMIN — FAMOTIDINE 20 MG: 20 TABLET, FILM COATED ORAL at 08:03

## 2018-03-26 RX ADMIN — SODIUM CHLORIDE: 0.9 INJECTION, SOLUTION INTRAVENOUS at 06:03

## 2018-03-26 RX ADMIN — SODIUM BICARBONATE: 84 INJECTION, SOLUTION INTRAVENOUS at 02:03

## 2018-03-26 RX ADMIN — METRONIDAZOLE 500 MG: 500 TABLET ORAL at 08:03

## 2018-03-26 RX ADMIN — HEPARIN SODIUM 14 UNITS/KG/HR: 10000 INJECTION, SOLUTION INTRAVENOUS at 02:03

## 2018-03-26 RX ADMIN — ISOSORBIDE MONONITRATE 120 MG: 60 TABLET, EXTENDED RELEASE ORAL at 08:03

## 2018-03-26 RX ADMIN — CEPHALEXIN 500 MG: 500 CAPSULE ORAL at 08:03

## 2018-03-26 RX ADMIN — ASPIRIN 81 MG: 81 TABLET, COATED ORAL at 08:03

## 2018-03-26 RX ADMIN — ATORVASTATIN CALCIUM 80 MG: 20 TABLET, FILM COATED ORAL at 08:03

## 2018-03-26 RX ADMIN — CLOPIDOGREL BISULFATE 75 MG: 75 TABLET, FILM COATED ORAL at 09:03

## 2018-03-26 NOTE — ASSESSMENT & PLAN NOTE
Seen by ENT  Packing removed 3/26- discontinued antibiotics  - Nasal saline to bilateral nares q 4 hrs   - Avoid nasal cannula; recommend humidified 02

## 2018-03-26 NOTE — SUBJECTIVE & OBJECTIVE
Interval History: Patient to undergo [facility] today. Patient feels well with no complaints except for constipation. No chest pain or shortness of breath.     Review of Systems   Constitutional: Negative for chills and fever.   Respiratory: Negative for cough and shortness of breath.    Cardiovascular: Negative for chest pain and palpitations.   Gastrointestinal: Negative for abdominal pain, diarrhea, nausea and vomiting.   Genitourinary: Negative for dysuria and hematuria.   Musculoskeletal: Negative for back pain.   Neurological: Negative for light-headedness and headaches.   Psychiatric/Behavioral: Negative for agitation and behavioral problems.     Objective:     Vital Signs (Most Recent):  Temp: 97.5 °F (36.4 °C) (03/26/18 1149)  Pulse: (!) 50 (03/26/18 1149)  Resp: 18 (03/26/18 1149)  BP: 133/63 (03/26/18 1149)  SpO2: 97 % (03/26/18 1149) Vital Signs (24h Range):  Temp:  [97.5 °F (36.4 °C)-98.4 °F (36.9 °C)] 97.5 °F (36.4 °C)  Pulse:  [50-74] 50  Resp:  [16-18] 18  SpO2:  [92 %-97 %] 97 %  BP: (122-149)/(58-66) 133/63     Weight: 83.6 kg (184 lb 4.9 oz)  Body mass index is 30.67 kg/m².    Intake/Output Summary (Last 24 hours) at 03/26/18 1246  Last data filed at 03/26/18 0951   Gross per 24 hour   Intake           804.68 ml   Output             2300 ml   Net         -1495.32 ml      Physical Exam   Constitutional: He is oriented to person, place, and time. He appears well-developed and well-nourished. No distress.   HENT:   Head: Normocephalic and atraumatic.   Mouth/Throat: Oropharynx is clear and moist.   Eyes: Conjunctivae and EOM are normal.   Neck: Normal range of motion. Neck supple.   Cardiovascular: Normal rate, regular rhythm and normal heart sounds.  Exam reveals no friction rub.    No murmur heard.  Pulmonary/Chest: Effort normal and breath sounds normal. No respiratory distress. He has no wheezes. He has no rales.   Abdominal: Soft. Bowel sounds are normal. He exhibits no distension. There is no  tenderness. There is no guarding.   Musculoskeletal: Normal range of motion. He exhibits no edema.   Neurological: He is alert and oriented to person, place, and time.   Skin: Skin is warm and dry. No rash noted. He is not diaphoretic.   Psychiatric: He has a normal mood and affect. His behavior is normal.       Significant Labs:   CBC:   Recent Labs  Lab 03/25/18  0849 03/25/18  1621 03/26/18  0451   WBC 5.62 6.04 5.56   HGB 11.1* 10.8* 11.0*   HCT 33.5* 31.3* 32.9*    153 160     CMP:   Recent Labs  Lab 03/25/18  0347 03/26/18  0451    139   K 4.8 4.8    107   CO2 19* 23   GLU 86 100   BUN 54* 56*   CREATININE 1.6* 1.7*   CALCIUM 9.5 9.7   ANIONGAP 10 9   EGFRNONAA 41.2* 38.3*       Significant Imaging: none

## 2018-03-26 NOTE — SUBJECTIVE & OBJECTIVE
Interval History: NAEON no bleeding     Medications:  Continuous Infusions:   heparin (porcine) in D5W 14 Units/kg/hr (03/26/18 0231)    sodium bicarbonate drip 50 mL/hr at 03/25/18 1707     Scheduled Meds:   allopurinol  100 mg Oral Daily    aspirin  81 mg Oral Daily    atorvastatin  80 mg Oral Daily    carvedilol  12.5 mg Oral BID    cephALEXin  500 mg Oral Q12H    clopidogrel  75 mg Oral Daily    famotidine  20 mg Oral Daily    ferrous sulfate  325 mg Oral Daily    isosorbide mononitrate  120 mg Oral Daily    Lactobacillus rhamnosus GG  1 capsule Oral BID    metroNIDAZOLE  500 mg Oral Q12H    NIFEdipine  30 mg Oral Daily     PRN Meds:acetaminophen, dextrose 50%, dextrose 50%, glucagon (human recombinant), glucose, glucose, insulin aspart U-100, magnesium oxide, nitroGLYCERIN, potassium chloride 10%, potassium, sodium phosphates, ramelteon, sodium chloride 0.9%     Review of patient's allergies indicates:  No Known Allergies  Objective:     Vital Signs (24h Range):  Temp:  [97.7 °F (36.5 °C)-98.4 °F (36.9 °C)] 98.4 °F (36.9 °C)  Pulse:  [56-86] 67  Resp:  [16-18] 18  SpO2:  [92 %-97 %] 97 %  BP: (117-167)/(56-72) 122/60        Lines/Drains/Airways     Peripheral Intravenous Line                 Peripheral IV - Single Lumen 03/23/18 1835 Left Forearm 2 days                Physical Exam  NAD aao x 3  No bleeding from nasal cavity or oral cavity  No resp distress     Significant Labs:  CBC:   Recent Labs  Lab 03/26/18  0451   WBC 5.56   RBC 3.68*   HGB 11.0*   HCT 32.9*      MCV 89   MCH 29.9   MCHC 33.4     CMP:   Recent Labs  Lab 03/21/18  0630 03/21/18  0749  03/26/18  0451     104 101  101  < > 100   CALCIUM 8.9  8.9 9.0  9.0  < > 9.7   ALBUMIN 3.4* 3.4*  --   --    PROT 6.9  --   --   --      140 138  138  < > 139   K 4.3  4.4 4.5  4.5  < > 4.8   CO2 19*  20* 23  23  < > 23     110 107  107  < > 107   BUN 93*  92* 89*  89*  < > 56*   CREATININE 3.7*  3.6*  3.6*  3.6*  < > 1.7*   ALKPHOS 77  --   --   --    ALT 13  --   --   --    AST 43*  --   --   --    BILITOT 0.7  --   --   --    < > = values in this interval not displayed.  Coagulation:   Recent Labs  Lab 03/26/18  0451   LABPROT 17.0*   INR 1.7*       Significant Diagnostics:  None

## 2018-03-26 NOTE — ASSESSMENT & PLAN NOTE
Bedside preliminary echocardiogram performed by cards fellow: EF 25-30% with inferolateral wall hypokinesis/akinesis. Mid and apical anterolateral wall akinesis. RAP<3, Moderate MR and TR. Official read revealed decrease in EF to 50-55% and hypokinesis in the following areas: mid anteroseptum, apical septum.  - Aspirin, 81 mg PO continue plavix   - BB <70 and treat for goal BP<130/80.   - C/w imdur 120 mg daily   - Transitioned to carvedilol 6.25 mg BID for HTN  - c/w lipitor 80 mg daily  - PET stress resulted as follows:     CONCLUSIONS: ABNORMAL MYOCARDIAL PERFUSION PET STRESS TEST  1. There is a moderate sized mild to moderate ischemia in the base to distal inferolateral wall. This defect comprises 15 % of the left ventricular myocardium.   2. Resting wall motion is physiologic. Stress wall motion is physiologic.   3. LV function is normal at rest and stress.  (normal is >= 51%)  4. The ventricular volumes are normal at rest and stress.   5. The extracardiac distribution of radioactivity is normal.   6. When compared to the previous study from 12/8/16, there is no signficant change.    - Patient to undergo [facility] 3/26

## 2018-03-26 NOTE — PROGRESS NOTES
[facility] Hospital Medicine  Progress Note    Patient Name: Dariel Urbina  MRN: [id number]  Patient Class: IP- Inpatient   Admission Date: 3/19/2018  Length of Stay: 7 days  Attending Physician: Lamberto Miranda MD  Primary Care Provider: Devon Langston MD    [facility] Medicine Team: [facility] HOSP MED 3 Liz Mata DO    Subjective:     Principal Problem:NSTEMI (non-ST elevated myocardial infarction)    HPI:  Mr Urbina is a 78 yo male with PMH significant for HTN, CKD III, HLD, CAD, DMT2, gout, mechanical aortic valve, diastolic heart failure, and history of left-sided colectomy for diverticulosis who presents with diarrhea of three day duration with sudden onset. Diarrhea is watery nigh on clear without any mucus or blood. Pt c/o greater than 6 episodes of diarrhea per day for the past three days. Associated symptoms include weakness, fatigue and decreased urine output. He has not urinated for the past two days. He denies any abdominal pain or cramps currently. Pt has tried immodium, kaopectate and pepto bismal without any symptomatic benefit. He has never had an episode of diarrhea like this before. Pt cannot attribute this episode of diarrhea to any particular cause.     He denies any recent changes in diet or recent travel. Denies sick contacts, fevers, chills, headache, or confusion. Denies recent antibiotics or hospitalizations. Denies changes to medications. Review of systems was positive for shortness of breath, cough while eating, and decreased urine output. Pt also endorses a decreased appetite over the past two months resulting in an unintentional twenty pound weight loss.     Hospital Course:  Mr Urbina is a 78 yo male who presented for diarrhea resulting in prerenal acute kidney injury and non-anion gap metabolic acidosis. He was treated for both with IV hydration, initially with lactated ringer's to avoid superimposing hyperchloremic metabolic acidosis, however he was then  noted to have hyperkalemia and thus was transitioned to normal saline. Hyperkalemia was treated with kayexelate and shifting. During treatment of hyperkalemia, pt developed chest pain that was evaluated with troponin and EKG. Overnight, his troponinemia worsened enough to raise concern for NSTEMI. He was evaluated with PET stress test which revealed inferolateral wall ischemia. He underwent inpatient LHC for coronary evaluation on 3/26.     Interval History: Patient to undergo LHC today. Patient feels well with no complaints except for constipation. No chest pain or shortness of breath.     Review of Systems   Constitutional: Negative for chills and fever.   Respiratory: Negative for cough and shortness of breath.    Cardiovascular: Negative for chest pain and palpitations.   Gastrointestinal: Negative for abdominal pain, diarrhea, nausea and vomiting.   Genitourinary: Negative for dysuria and hematuria.   Musculoskeletal: Negative for back pain.   Neurological: Negative for light-headedness and headaches.   Psychiatric/Behavioral: Negative for agitation and behavioral problems.     Objective:     Vital Signs (Most Recent):  Temp: 97.5 °F (36.4 °C) (03/26/18 1149)  Pulse: (!) 50 (03/26/18 1149)  Resp: 18 (03/26/18 1149)  BP: 133/63 (03/26/18 1149)  SpO2: 97 % (03/26/18 1149) Vital Signs (24h Range):  Temp:  [97.5 °F (36.4 °C)-98.4 °F (36.9 °C)] 97.5 °F (36.4 °C)  Pulse:  [50-74] 50  Resp:  [16-18] 18  SpO2:  [92 %-97 %] 97 %  BP: (122-149)/(58-66) 133/63     Weight: 83.6 kg (184 lb 4.9 oz)  Body mass index is 30.67 kg/m².    Intake/Output Summary (Last 24 hours) at 03/26/18 1246  Last data filed at 03/26/18 0951   Gross per 24 hour   Intake           804.68 ml   Output             2300 ml   Net         -1495.32 ml      Physical Exam   Constitutional: He is oriented to person, place, and time. He appears well-developed and well-nourished. No distress.   HENT:   Head: Normocephalic and atraumatic.   Mouth/Throat:  Oropharynx is clear and moist.   Eyes: Conjunctivae and EOM are normal.   Neck: Normal range of motion. Neck supple.   Cardiovascular: Normal rate, regular rhythm and normal heart sounds.  Exam reveals no friction rub.    No murmur heard.  Pulmonary/Chest: Effort normal and breath sounds normal. No respiratory distress. He has no wheezes. He has no rales.   Abdominal: Soft. Bowel sounds are normal. He exhibits no distension. There is no tenderness. There is no guarding.   Musculoskeletal: Normal range of motion. He exhibits no edema.   Neurological: He is alert and oriented to person, place, and time.   Skin: Skin is warm and dry. No rash noted. He is not diaphoretic.   Psychiatric: He has a normal mood and affect. His behavior is normal.       Significant Labs:   CBC:   Recent Labs  Lab 03/25/18  0849 03/25/18  1621 03/26/18  0451   WBC 5.62 6.04 5.56   HGB 11.1* 10.8* 11.0*   HCT 33.5* 31.3* 32.9*    153 160     CMP:   Recent Labs  Lab 03/25/18  0347 03/26/18  0451    139   K 4.8 4.8    107   CO2 19* 23   GLU 86 100   BUN 54* 56*   CREATININE 1.6* 1.7*   CALCIUM 9.5 9.7   ANIONGAP 10 9   EGFRNONAA 41.2* 38.3*       Significant Imaging: none    Assessment/Plan:      * NSTEMI (non-ST elevated myocardial infarction)    Bedside preliminary echocardiogram performed by cards fellow: EF 25-30% with inferolateral wall hypokinesis/akinesis. Mid and apical anterolateral wall akinesis. RAP<3, Moderate MR and TR. Official read revealed decrease in EF to 50-55% and hypokinesis in the following areas: mid anteroseptum, apical septum.  - Aspirin, 81 mg PO continue plavix   - BB <70 and treat for goal BP<130/80.   - C/w imdur 120 mg daily   - Transitioned to carvedilol 6.25 mg BID for HTN  - c/w lipitor 80 mg daily  - PET stress resulted as follows:     CONCLUSIONS: ABNORMAL MYOCARDIAL PERFUSION PET STRESS TEST  1. There is a moderate sized mild to moderate ischemia in the base to distal inferolateral wall.  This defect comprises 15 % of the left ventricular myocardium.   2. Resting wall motion is physiologic. Stress wall motion is physiologic.   3. LV function is normal at rest and stress.  (normal is >= 51%)  4. The ventricular volumes are normal at rest and stress.   5. The extracardiac distribution of radioactivity is normal.   6. When compared to the previous study from 12/8/16, there is no signficant change.    - Patient to undergo LHC 3/26          Bleeding nose    Seen by ENT  Packing removed 3/26- discontinued antibiotics  - Nasal saline to bilateral nares q 4 hrs   - Avoid nasal cannula; recommend humidified 02         Acute renal failure superimposed on stage 3 chronic kidney disease    Now stable with Cr 1.7  Will monitor as this could worsen with contrast after LHC  Holding fosinopril for hyperkalemia (3% of patients taking fosinopril develop hyerkalemia)        H/O mechanical aortic valve replacement    LONG TERM CURRENT USE OF ANTICOAGULATION THERAPY    Pt on anticoagulation at home with daily warfarin. INR was 4.4 on admission. Has decreased PO intake for the past two months. INR 1.4 today. Will re-initiate warfarin.   - One dose this AM with additional dose in the afternoon  - Warfarin 5 mg every Sun, Mon, Wed, Fri  - Warfarin 7.5 mg every Tue, Thu, Sat  - Target INR of 2.5 (range 2.0 to 3.0)    -holding anticoagulation, as patient is on heparin. Will restart after LHC        Metabolic acidosis with normal anion gap and bicarbonate losses    Associated with severe diarrhea and kidney failure now improved  - continue bicarbonate gtt        CAD (coronary atherosclerotic disease)    PERIPHERAL VASCULAR DISEASE    Takes high-intensity statin. Also has nitroglycerin 0.4 mg PRN. Denies every requiring nitro. Nil aspirin in the outpatient setting.   - Aspirin 81 mg  - Plavix 300 then 75 mg  - Atorvastatin 80 mg QD  - Isosorbide mononitrate 120 mg qd                Gastroesophageal reflux disease without  esophagitis    On H2-inhibitor, famotidine at home.   - Will hold in setting of JIM  - Will restart on discharge          Obesity, diabetes, and hypertension syndrome    Pt reports 20 lbs of unintentional weight loss d/t decreased appetite   - Cardiac diet  - Follow up decreased appetite and weight loss in OP setting          Hyperparathyroidism due to renal insufficiency              Type 2 diabetes mellitus with diabetic peripheral angiopathy without gangrene    A1c was 5.5. On glimiperide at home.   - Low-dose SSI           Left ventricular diastolic dysfunction with preserved systolic function    ACE-inhibitor and furosemide at home. Held in setting of prerenal JIM.             Anemia of chronic renal failure, stage 3 (moderate)              CKD (chronic kidney disease), stage III    HYPERPARATHYROIDISM DUE TO RENAL INSUFFICIENCY  ANEMIA OF CHRONIC RENAL FAILURE, STAGE 3    GFR is usually in the 50's in the setting of high blood pressure. Corrected calcium was 10.0 mg/dL.  - Acute renal failure has resolved   - Continue ferrous sulfate, 325 mg qd (per outpatient regimen)          History of gout    Allopurinol 100 mg qd          Renovascular hypertension    Home regimen includes fosinopril 40 mg qd, nifedipine 30 mg qd.  - Will hold fosinopril in setting of JIM  - Continue nifedipine  - Will hold fosinopril d/t difficult to manage hyperkalemia            Hyperlipidemia    On fenofibrate 200 mg at home and atorvastatin 80 mg qd  - Will continue statin          Long term current use of anticoagulant therapy              PVD (peripheral vascular disease)                VTE Risk Mitigation         Ordered     heparin 25,000 units in dextrose 5% 250 mL (100 units/mL) infusion  Continuous     Route:  Intravenous        03/25/18 6337              Liz Mata DO  Department of Hospital Medicine   [facility]

## 2018-03-26 NOTE — CONSULTS
[facility]  Interventional Cardiology  Consult Note    Patient Name: Dariel Urbina  MRN: [id number]  Admission Date: 3/19/2018  Hospital Length of Stay: 7 days  Code Status: Full Code   Attending Provider: Lamberto Miranda MD  Consulting Provider: Fito Herrera MD  Primary Care Physician: Devon Langston MD  Principal Problem:Acute renal failure superimposed on stage 3 chronic kidney disease    Patient information was obtained from patient, past medical records and ER records.     Inpatient consult to Interventional Cardiology  Consult performed by: FITO HERRERA  Consult ordered by: FITO HERRERA  Reason for consult: [facility]        Subjective:     Chief Complaint:  NSTEMI      HPI:  Patient is a 76 yo male with a past medical hx significant for CAD (s/p CABG in 2002 - LIMA-LAD, SVG-OM2, multiple PCIs, PCI to RCA 2003, PCI to LCx x 2 in 92'), AS (s/p mechanical AVR in 2003), HTN, HLD, PAD, T2DM, CKD III presented to the [facility] on 03/19/2018 with a hx of watery diarrhea for the last three days and profound JIM on CKD as indicated by his labwork. After admission, cardiology consulted for chest pain which occurred while he was getting his retroperitoneal US. Patient's cp spontaneously resolved and his first troponin came back at 0.042. It has since trended upwards to 13 and now has downtrended. His EKG's showed his His EKG showed his pre-existing LBBB and NSR with 1st AV block. He was placed on DAPT and due to his INR being supratherapeutic he was not initiated on anti-coagulation. He was treated for his JIM with hydration and IVF and his diarrhea resolved which corrected his non-gap metabolic acidosis.      On this admission, the patient's HR and BP were treated with GDMT. Coreg 12.5 mg BID was initiated and ACE (patient takes fosinopril) was held due to JIM. PET stress was obtained on this admission and it showed large ischemic area in the distribution of the previous bypass SVG to OM2. His  2D echo here showed low normal EF 50% and wall motion abnormalities (apex, apical septum).     Interventional cardiology consulted for [facility].      Past Medical History:   Diagnosis Date    Angina, class III 9/17/2014    Bilateral carotid artery disease 2/9/2017    CAD (coronary atherosclerotic disease) 9/11/2012    Cataract     Chronic kidney disease     Claudication of left lower extremity 9/17/2014    DM (diabetes mellitus) 9/26/2012    History of gout 9/26/2012    Hyperlipidemia     Hypertension     Mechanical heart valve present     NSTEMI (non-ST elevated myocardial infarction) 3/21/2018       Past Surgical History:   Procedure Laterality Date    CARDIAC CATHETERIZATION      CARDIAC VALVE REPLACEMENT      CARDIAC VALVE SURGERY      COLON SURGERY      COLONOSCOPY N/A 3/31/2017    Procedure: COLONOSCOPY;  Surgeon: Bruno Raymond MD;  Location: [facility] ([address] [place] [address]);  Service: Endoscopy;  Laterality: N/A;  Patient's wife requesting date.    CORONARY ANGIOPLASTY      CORONARY ARTERY BYPASS GRAFT      HERNIA REPAIR      SPINE SURGERY      VASECTOMY         Review of patient's allergies indicates:  No Known Allergies    PTA Medications   Medication Sig    allopurinol (ZYLOPRIM) 100 MG tablet TAKE 1 TABLET ONE TIME DAILY    atorvastatin (LIPITOR) 80 MG tablet TAKE 1 TABLET ONE TIME DAILY    clotrimazole-betamethasone 1-0.05% (LOTRISONE) cream Apply topically once daily. in between the 4th and 5th toes left foot.    fenofibrate micronized (LOFIBRA) 200 MG Cap Take 200 mg by mouth daily with breakfast.    ferrous sulfate 325 (65 FE) MG EC tablet Take 1 tablet (325 mg total) by mouth once daily.    fosinopril (MONOPRIL) 40 MG tablet TAKE 1 TABLET ONE TIME DAILY    furosemide (LASIX) 20 MG tablet TAKE 1 TABLET ONE TIME DAILY    glimepiride (AMARYL) 1 MG tablet Take 1 tablet (1 mg total) by mouth every morning.    isosorbide mononitrate (IMDUR) 120 MG 24 hr tablet Take 1 tablet (120 mg total)  by mouth once daily.    loperamide (IMODIUM) 2 mg capsule Take 2 mg by mouth 4 (four) times daily as needed for Diarrhea.    metoprolol tartrate (LOPRESSOR) 50 MG tablet Take 1 tablet (50 mg total) by mouth once daily.    nifedipine (PROCARDIA-XL) 30 MG (OSM) 24 hr tablet Take 1 tablet (30 mg total) by mouth once daily.    nitroGLYCERIN (NITROSTAT) 0.4 MG SL tablet Place 1 tablet (0.4 mg total) under the tongue every 5 (five) minutes as needed. As needed for chest pain    omega-3 acid ethyl esters (LOVAZA) 1 gram capsule Take 2 capsules (2 g total) by mouth 2 (two) times daily.    ranitidine (ZANTAC) 300 MG tablet Take 1 tablet (300 mg total) by mouth every evening.    warfarin (COUMADIN) 5 MG tablet Take 1 tablet (5 mg total) by mouth Daily. Current Dose (7.5 mg on Mon, Wed, Fri; 5 mg all other days)     Family History     Problem Relation (Age of Onset)    Alcohol abuse Father    Diabetes Brother    Heart disease Mother, Father, Brother, Sister    Heart failure Mother, Father, Brother    No Known Problems Sister, Maternal Grandmother, Maternal Grandfather, Paternal Grandmother, Paternal Grandfather, Maternal Aunt, Maternal Uncle, Paternal Aunt, Paternal Uncle        Social History Main Topics    Smoking status: Former Smoker     Packs/day: 1.00     Years: 20.00     Quit date: 9/11/1980    Smokeless tobacco: Never Used    Alcohol use No    Drug use: No    Sexual activity: No     Review of Systems   Constitution: Negative for chills and fever.   HENT: Negative for congestion.    Cardiovascular: Positive for dyspnea on exertion (has chronic LÓPEZ and has not worsened). Negative for chest pain, irregular heartbeat, leg swelling, orthopnea, palpitations and syncope.   Respiratory: Positive for shortness of breath (mild chronic not worsened). Negative for cough.    Gastrointestinal: Negative for abdominal pain, diarrhea, nausea and vomiting.   Neurological: Negative for dizziness and headaches.     Objective:      Vital Signs (Most Recent):  Temp: 97.7 °F (36.5 °C) (03/25/18 1118)  Pulse: 86 (03/25/18 1200)  Resp: 16 (03/25/18 1200)  BP: (!) 117/56 (03/25/18 1118)  SpO2: 96 % (03/25/18 1200) Vital Signs (24h Range):  Temp:  [97.6 °F (36.4 °C)-98.1 °F (36.7 °C)] 97.7 °F (36.5 °C)  Pulse:  [57-86] 86  Resp:  [16-18] 16  SpO2:  [94 %-98 %] 96 %  BP: (117-172)/(56-79) 117/56     Weight: 83.6 kg (184 lb 4.9 oz)  Body mass index is 30.67 kg/m².    SpO2: 96 %  O2 Device (Oxygen Therapy): room air      Intake/Output Summary (Last 24 hours) at 03/25/18 1558  Last data filed at 03/25/18 0649   Gross per 24 hour   Intake                0 ml   Output             1850 ml   Net            -1850 ml       Lines/Drains/Airways     Peripheral Intravenous Line                 Peripheral IV - Single Lumen 03/23/18 1835 Left Forearm 1 day                Physical Exam   Constitutional: He is oriented to person, place, and time. He appears well-developed and well-nourished.   HENT:   Head: Normocephalic and atraumatic.   Eyes: EOM are normal. Pupils are equal, round, and reactive to light.   Neck: Normal range of motion. Neck supple. No JVD present.   Cardiovascular: Normal rate, regular rhythm and intact distal pulses.    Murmur (holosystolic mumur heard loudes in the 4th and 5th spaces mid-clavicular line) heard.  Pulmonary/Chest: Effort normal and breath sounds normal. He has no wheezes.   Abdominal: Soft. Bowel sounds are normal. There is no tenderness.   Musculoskeletal: Normal range of motion. He exhibits no edema.   Neurological: He is alert and oriented to person, place, and time. He has normal reflexes.   Vitals reviewed.      Significant Labs:   CMP   Recent Labs  Lab 03/24/18  0330 03/25/18  0347    139   K 4.9 4.8    110   CO2 22* 19*   GLU 87 86   BUN 70* 54*   CREATININE 2.0* 1.6*   CALCIUM 9.6 9.5   ANIONGAP 10 10   ESTGFRAFRICA 36.4* 47.7*   EGFRNONAA 31.5* 41.2*   , CBC   Recent Labs  Lab 03/25/18  0849   WBC 5.62    HGB 11.1*   HCT 33.5*      , INR   Recent Labs  Lab 03/24/18  0330 03/25/18  0346   INR 1.4* 1.5*   ,   Pathology Results  (Last 10 years)    None      , Troponin No results for input(s): TROPONINI in the last 48 hours. and All pertinent lab results from the last 24 hours have been reviewed.    Significant Imaging: Echocardiogram:   2D echo with color flow doppler:   Results for orders placed or performed during the hospital encounter of 03/19/18   2D echo with color flow doppler   Result Value Ref Range    EF 50 55 - 65    Mitral Valve Regurgitation MILD     Diastolic Dysfunction No     Mitral Valve Mobility NORMAL     and X-Ray: CXR: X-Ray Chest 1 View (CXR):   Results for orders placed or performed during the hospital encounter of 03/19/18   X-Ray Chest 1 View    Narrative    EXAMINATION:  XR CHEST 1 VIEW    CLINICAL HISTORY:  SOB;    TECHNIQUE:  Single frontal view of the chest was performed.    COMPARISON:  03/22/2003    FINDINGS:  Post-operative changes are again identified in the thorax.  Heart size and pulmonary vascularity are within normal limits.  Lungs are well expanded and clear.  No pleural fluid or pneumothorax.  Degenerative changes involving the thoracic spine and both AC joints.      Impression    No active cardiopulmonary disease and no detrimental interval change      Electronically signed by: Ayad Baptiste MD  Date:    03/20/2018  Time:    15:41     Assessment and Plan:     NSTEMI (non-ST elevated myocardial infarction)    Patient is a 78 yo male with a past medical hx significant for CAD (s/p CABG in 2002 - LIMA-LAD, SVG-OM2, multiple PCIs), AS (s/p mechanical AVR), HTN, HLD, PAD, T2DM, CKD III here with a NSTEMI superimposed upon his SWAPNIL on CKD and hypovolemia secondary to acute diarrhea. His Swapnil has stablized and needs better BP control. Consented for [facility] however SOFYA risk is 27% and there is a 1% risk of HD.     Plan:  - Consented and made NPO.   - Now on heparin gtt  -The risks,  benefits & alternatives of the procedure were explained to the patient. Staff will discuss with patient    -The risks of coronary angiography include but are not limited to:  Bleeding, infection, heart rhythm abnormalities, allergic reactions, kidney injury, stroke and death.    -Should stenting be indicated, the patient has agreed to dual anti-platelet therapy for 1-consecutive year with a drug-eluting stent and a minimum of 1-month with the use of a bare metal stent.    -The risks of moderate sedation include hypotension, respiratory depression, arrhythmias, bronchospasm, & death.    -Informed consent was obtained & the patient is agreeable to proceed with the procedure.  -This patient was discussed with the attending interventional cardiologist who agrees with the above assessment & plan.             Metabolic acidosis with normal anion gap and bicarbonate losses    Relatively resolved bicarb this morning is 23         Type 2 diabetes mellitus with diabetic peripheral angiopathy without gangrene    Last hb1ac is A1c was 5.5. On glimiperide at home.   Defer to primary team.        Left ventricular diastolic dysfunction with preserved systolic function    See NSTEMI        Renovascular hypertension    Needs better goal   Would restart patient's         Hyperlipidemia    C/w high intensity statin        Long term current use of anticoagulant therapy    S/p MVR   Will hold Warfarin and start the patient on heparin gtt for [facility] tomorrow.        CAD (coronary atherosclerotic disease)    See NSTEMI            VTE Risk Mitigation         Ordered     heparin 25,000 units in dextrose 5% 250 mL (100 units/mL) infusion  Continuous     Route:  Intravenous        03/25/18 8499          Thank you for your consult. I will follow-up with patient. Please contact us if you have any additional questions.    Fito Ramirez MD  Interventional Cardiology   [facility]

## 2018-03-26 NOTE — ASSESSMENT & PLAN NOTE
LONG TERM CURRENT USE OF ANTICOAGULATION THERAPY    Pt on anticoagulation at home with daily warfarin. INR was 4.4 on admission. Has decreased PO intake for the past two months. INR 1.4 today. Will re-initiate warfarin.   - One dose this AM with additional dose in the afternoon  - Warfarin 5 mg every Sun, Mon, Wed, Fri  - Warfarin 7.5 mg every Tue, Thu, Sat  - Target INR of 2.5 (range 2.0 to 3.0)    -holding anticoagulation, as patient is on heparin. Will restart after [facility]

## 2018-03-26 NOTE — ASSESSMENT & PLAN NOTE
Now stable with Cr 1.7  Will monitor as this could worsen with contrast after [facility]  Holding fosinopril for hyperkalemia (3% of patients taking fosinopril develop hyerkalemia)

## 2018-03-26 NOTE — PROGRESS NOTES
[facility]  Otorhinolaryngology-Head & Neck Surgery  Progress Note    Subjective:     Post-Op Info:  Procedure(s) (LRB):  Left heart cath (Left)      Hospital Day: 8     Interval History: NAEON no bleeding     Medications:  Continuous Infusions:   heparin (porcine) in D5W 14 Units/kg/hr (03/26/18 0231)    sodium bicarbonate drip 50 mL/hr at 03/25/18 1707     Scheduled Meds:   allopurinol  100 mg Oral Daily    aspirin  81 mg Oral Daily    atorvastatin  80 mg Oral Daily    carvedilol  12.5 mg Oral BID    cephALEXin  500 mg Oral Q12H    clopidogrel  75 mg Oral Daily    famotidine  20 mg Oral Daily    ferrous sulfate  325 mg Oral Daily    isosorbide mononitrate  120 mg Oral Daily    Lactobacillus rhamnosus GG  1 capsule Oral BID    metroNIDAZOLE  500 mg Oral Q12H    NIFEdipine  30 mg Oral Daily     PRN Meds:acetaminophen, dextrose 50%, dextrose 50%, glucagon (human recombinant), glucose, glucose, insulin aspart U-100, magnesium oxide, nitroGLYCERIN, potassium chloride 10%, potassium, sodium phosphates, ramelteon, sodium chloride 0.9%     Review of patient's allergies indicates:  No Known Allergies  Objective:     Vital Signs (24h Range):  Temp:  [97.7 °F (36.5 °C)-98.4 °F (36.9 °C)] 98.4 °F (36.9 °C)  Pulse:  [56-86] 67  Resp:  [16-18] 18  SpO2:  [92 %-97 %] 97 %  BP: (117-167)/(56-72) 122/60        Lines/Drains/Airways     Peripheral Intravenous Line                 Peripheral IV - Single Lumen 03/23/18 1835 Left Forearm 2 days                Physical Exam  NAD aao x 3  No bleeding from nasal cavity or oral cavity  No resp distress     Significant Labs:  CBC:   Recent Labs  Lab 03/26/18  0451   WBC 5.56   RBC 3.68*   HGB 11.0*   HCT 32.9*      MCV 89   MCH 29.9   MCHC 33.4     CMP:   Recent Labs  Lab 03/21/18  0630 03/21/18  0749  03/26/18  0451     104 101  101  < > 100   CALCIUM 8.9  8.9 9.0  9.0  < > 9.7   ALBUMIN 3.4* 3.4*  --   --    PROT 6.9  --   --   --    NA  138  140 138  138  < > 139   K 4.3  4.4 4.5  4.5  < > 4.8   CO2 19*  20* 23  23  < > 23     110 107  107  < > 107   BUN 93*  92* 89*  89*  < > 56*   CREATININE 3.7*  3.6* 3.6*  3.6*  < > 1.7*   ALKPHOS 77  --   --   --    ALT 13  --   --   --    AST 43*  --   --   --    BILITOT 0.7  --   --   --    < > = values in this interval not displayed.  Coagulation:   Recent Labs  Lab 03/26/18  0451   LABPROT 17.0*   INR 1.7*       Significant Diagnostics:  None       Assessment/Plan:     Bleeding nose    Assessment: 76 y/o male with multiple medical co-morbidities with left-sided epistaxis. He is currently on aspirin, plavix, and coumadin. Pack removed at bedside today      Plan:   - may stop anti-staph antibiotics   - Nasal saline to bilateral nares q 4 hrs   - Avoid nasal cannula; recommend humidified 02   - avoid nasal trauma, sneeze with mouth open, no nose blowing x 2 weeks   - please contact ENT team with any further questions                 Evert Alfredo MD  Otorhinolaryngology-Head & Neck Surgery  [facility]

## 2018-03-26 NOTE — PLAN OF CARE
Problem: Patient Care Overview  Goal: Plan of Care Review  Outcome: Ongoing (interventions implemented as appropriate)  Patient A&O x 4. Pleasant and cooperative with care. Vitals stable. Heparin gtt infusing. Nasal packing in place, no evidence of bleeding. Ambulates with steady gait independently. NPO at midnight. No acute changes overnight, no complaints.

## 2018-03-27 LAB
ANION GAP SERPL CALC-SCNC: 9 MMOL/L
BUN SERPL-MCNC: 47 MG/DL
CALCIUM SERPL-MCNC: 9.5 MG/DL
CHLORIDE SERPL-SCNC: 108 MMOL/L
CO2 SERPL-SCNC: 23 MMOL/L
CREAT SERPL-MCNC: 1.6 MG/DL
EST. GFR  (AFRICAN AMERICAN): 47.7 ML/MIN/1.73 M^2
EST. GFR  (NON AFRICAN AMERICAN): 41.2 ML/MIN/1.73 M^2
FACT X PPP CHRO-ACNC: 0.83 IU/ML
FACT X PPP CHRO-ACNC: 1.16 IU/ML
GLUCOSE SERPL-MCNC: 82 MG/DL
INR PPP: 1.5
POTASSIUM SERPL-SCNC: 4.8 MMOL/L
PROTHROMBIN TIME: 14.5 SEC
SODIUM SERPL-SCNC: 140 MMOL/L

## 2018-03-27 PROCEDURE — 94761 N-INVAS EAR/PLS OXIMETRY MLT: CPT

## 2018-03-27 PROCEDURE — 80048 BASIC METABOLIC PNL TOTAL CA: CPT

## 2018-03-27 PROCEDURE — 20600001 HC STEP DOWN PRIVATE ROOM

## 2018-03-27 PROCEDURE — 25000003 PHARM REV CODE 250: Performed by: STUDENT IN AN ORGANIZED HEALTH CARE EDUCATION/TRAINING PROGRAM

## 2018-03-27 PROCEDURE — 25000003 PHARM REV CODE 250: Performed by: INTERNAL MEDICINE

## 2018-03-27 PROCEDURE — 93005 ELECTROCARDIOGRAM TRACING: CPT

## 2018-03-27 PROCEDURE — 63600175 PHARM REV CODE 636 W HCPCS: Performed by: STUDENT IN AN ORGANIZED HEALTH CARE EDUCATION/TRAINING PROGRAM

## 2018-03-27 PROCEDURE — C1887 CATHETER, GUIDING: HCPCS

## 2018-03-27 PROCEDURE — 85520 HEPARIN ASSAY: CPT | Mod: 91

## 2018-03-27 PROCEDURE — 93455 CORONARY ART/GRFT ANGIO S&I: CPT | Mod: 26,,, | Performed by: INTERNAL MEDICINE

## 2018-03-27 PROCEDURE — 85520 HEPARIN ASSAY: CPT

## 2018-03-27 PROCEDURE — 93010 ELECTROCARDIOGRAM REPORT: CPT | Mod: ,,, | Performed by: INTERNAL MEDICINE

## 2018-03-27 PROCEDURE — 99152 MOD SED SAME PHYS/QHP 5/>YRS: CPT | Mod: ,,, | Performed by: INTERNAL MEDICINE

## 2018-03-27 PROCEDURE — 99232 SBSQ HOSP IP/OBS MODERATE 35: CPT | Mod: GC,,, | Performed by: HOSPITALIST

## 2018-03-27 PROCEDURE — B2111ZZ FLUOROSCOPY OF MULTIPLE CORONARY ARTERIES USING LOW OSMOLAR CONTRAST: ICD-10-PCS | Performed by: INTERNAL MEDICINE

## 2018-03-27 PROCEDURE — 25000003 PHARM REV CODE 250

## 2018-03-27 PROCEDURE — 63600175 PHARM REV CODE 636 W HCPCS

## 2018-03-27 PROCEDURE — 85610 PROTHROMBIN TIME: CPT

## 2018-03-27 PROCEDURE — 36415 COLL VENOUS BLD VENIPUNCTURE: CPT

## 2018-03-27 PROCEDURE — 99152 MOD SED SAME PHYS/QHP 5/>YRS: CPT

## 2018-03-27 RX ORDER — LOSARTAN POTASSIUM 25 MG/1
25 TABLET ORAL DAILY
Status: DISCONTINUED | OUTPATIENT
Start: 2018-03-27 | End: 2018-03-28

## 2018-03-27 RX ORDER — ENOXAPARIN SODIUM 150 MG/ML
1 INJECTION SUBCUTANEOUS EVERY 12 HOURS
Status: DISCONTINUED | OUTPATIENT
Start: 2018-03-27 | End: 2018-03-28 | Stop reason: HOSPADM

## 2018-03-27 RX ORDER — SODIUM CHLORIDE 9 MG/ML
INJECTION, SOLUTION INTRAVENOUS ONCE
Status: COMPLETED | OUTPATIENT
Start: 2018-03-27 | End: 2018-03-27

## 2018-03-27 RX ORDER — WARFARIN SODIUM 5 MG/1
5 TABLET ORAL DAILY
Status: DISCONTINUED | OUTPATIENT
Start: 2018-03-27 | End: 2018-03-28 | Stop reason: HOSPADM

## 2018-03-27 RX ADMIN — CARVEDILOL 12.5 MG: 12.5 TABLET, FILM COATED ORAL at 09:03

## 2018-03-27 RX ADMIN — SODIUM CHLORIDE 1.5 ML/KG/HR: 0.9 INJECTION, SOLUTION INTRAVENOUS at 05:03

## 2018-03-27 RX ADMIN — METRONIDAZOLE 500 MG: 500 TABLET ORAL at 09:03

## 2018-03-27 RX ADMIN — FERROUS SULFATE TAB EC 325 MG (65 MG FE EQUIVALENT) 325 MG: 325 (65 FE) TABLET DELAYED RESPONSE at 09:03

## 2018-03-27 RX ADMIN — NIFEDIPINE 30 MG: 30 TABLET, FILM COATED, EXTENDED RELEASE ORAL at 09:03

## 2018-03-27 RX ADMIN — ISOSORBIDE MONONITRATE 120 MG: 60 TABLET, EXTENDED RELEASE ORAL at 09:03

## 2018-03-27 RX ADMIN — LOSARTAN POTASSIUM 25 MG: 25 TABLET ORAL at 05:03

## 2018-03-27 RX ADMIN — SALINE NASAL SPRAY 1 SPRAY: 1.5 SOLUTION NASAL at 12:03

## 2018-03-27 RX ADMIN — SALINE NASAL SPRAY 1 SPRAY: 1.5 SOLUTION NASAL at 05:03

## 2018-03-27 RX ADMIN — SALINE NASAL SPRAY 1 SPRAY: 1.5 SOLUTION NASAL at 04:03

## 2018-03-27 RX ADMIN — CLOPIDOGREL BISULFATE 75 MG: 75 TABLET, FILM COATED ORAL at 09:03

## 2018-03-27 RX ADMIN — SALINE NASAL SPRAY 1 SPRAY: 1.5 SOLUTION NASAL at 09:03

## 2018-03-27 RX ADMIN — ATORVASTATIN CALCIUM 80 MG: 20 TABLET, FILM COATED ORAL at 09:03

## 2018-03-27 RX ADMIN — FAMOTIDINE 20 MG: 20 TABLET, FILM COATED ORAL at 09:03

## 2018-03-27 RX ADMIN — ALLOPURINOL 100 MG: 100 TABLET ORAL at 09:03

## 2018-03-27 RX ADMIN — ASPIRIN 81 MG: 81 TABLET, COATED ORAL at 09:03

## 2018-03-27 RX ADMIN — ENOXAPARIN SODIUM 80 MG: 150 INJECTION SUBCUTANEOUS at 09:03

## 2018-03-27 RX ADMIN — WARFARIN SODIUM 5 MG: 5 TABLET ORAL at 05:03

## 2018-03-27 RX ADMIN — POLYETHYLENE GLYCOL 3350 17 G: 17 POWDER, FOR SOLUTION ORAL at 09:03

## 2018-03-27 RX ADMIN — SODIUM CHLORIDE 1000 ML: 0.9 INJECTION, SOLUTION INTRAVENOUS at 11:03

## 2018-03-27 RX ADMIN — Medication 1 CAPSULE: at 09:03

## 2018-03-27 NOTE — PROGRESS NOTES
Angiogram reviewed and SVG to OM2 not found and likely the cause of the NSTEMI.  In addition, pt has 75% distal LM stenosis.  Plan will be for patient to follow up in clinic with Dr. Vences in one month for consideration of LM stenting. Would continue warfarin and plavix at discharge as pt had NSTEMI with troponin of 13. Can stop ASA 81 mg to avoid triple therapy.    Rosalia Neal MD, MPH  Cardiology Fellow

## 2018-03-27 NOTE — PLAN OF CARE
Problem: Patient Care Overview  Goal: Plan of Care Review  Outcome: Ongoing (interventions implemented as appropriate)  Patient NPO for heart cath. He denies SOB, chest pain, or nausea vomiting. Patient to receive a second liter of NS for heart cath today. Family educated on accurate I/o.

## 2018-03-27 NOTE — ASSESSMENT & PLAN NOTE
Now stable with Cr 1.6  Will monitor as this could worsen with contrast after [facility]  Holding fosinopril for hyperkalemia (3% of patients taking fosinopril develop hyerkalemia)

## 2018-03-27 NOTE — NURSING
Received the patient from the cath lab. The patient reports some increased numbness to the left had in comparison to the right.Spo2 is 98% on room air.

## 2018-03-27 NOTE — ASSESSMENT & PLAN NOTE
HYPERTENSION  HYPERKALEMIA    Bedside preliminary echocardiogram performed by cards fellow: EF 25-30% with inferolateral wall hypokinesis/akinesis. Mid and apical anterolateral wall akinesis. RAP<3, Moderate MR and TR. Official read revealed decrease in EF to 50-55% and hypokinesis in the following areas: mid anteroseptum, apical septum.  - Aspirin, 81 mg PO  - BB <70 and treat for goal BP<130/80.   - C/w imdur 120 mg daily   - Transitioned to carvedilol 6.25 mg BID for HTN  - c/w lipitor 80 mg daily  - PET stress resulted as follows:     CONCLUSIONS: ABNORMAL MYOCARDIAL PERFUSION PET STRESS TEST  1. There is a moderate sized mild to moderate ischemia in the base to distal inferolateral wall. This defect comprises 15 % of the left ventricular myocardium.   2. Resting wall motion is physiologic. Stress wall motion is physiologic.   3. LV function is normal at rest and stress.  (normal is >= 51%)  4. The ventricular volumes are normal at rest and stress.   5. The extracardiac distribution of radioactivity is normal.   6. When compared to the previous study from 12/8/16, there is no signficant change.    LHC: 75% stenosis of left main, and diffuse disease of left circumflex. No intervention done    Attempted to start Losartan on 3/27, however this caused the patient to become hyperkalemic  Changed regimen to increase nifedipine, and forego ace/arb (as these cause hyperkalemia in this patient)  Given one dose kayexalate before discharge  Discharged home on plavix/warfarin

## 2018-03-27 NOTE — SUBJECTIVE & OBJECTIVE
Interval History:   Consented for [facility] today. No complaints of chest pain or SOB. Left radial access.     Objective:     Vital Signs (Most Recent):  Temp: 97.5 °F (36.4 °C) (03/27/18 0756)  Pulse: 63 (03/27/18 0800)  Resp: 12 (03/27/18 0756)  BP: (!) 163/73 (03/27/18 0756)  SpO2: (!) 94 % (03/27/18 0756) Vital Signs (24h Range):  Temp:  [97.5 °F (36.4 °C)-98.1 °F (36.7 °C)] 97.5 °F (36.4 °C)  Pulse:  [50-90] 63  Resp:  [12-18] 12  SpO2:  [93 %-97 %] 94 %  BP: (129-169)/(63-73) 163/73     Weight: 84.1 kg (185 lb 6.5 oz)  Body mass index is 30.85 kg/m².    SpO2: (!) 94 %  O2 Device (Oxygen Therapy): room air      Intake/Output Summary (Last 24 hours) at 03/27/18 0907  Last data filed at 03/27/18 0600   Gross per 24 hour   Intake           912.29 ml   Output             2275 ml   Net         -1362.71 ml       Lines/Drains/Airways     Peripheral Intravenous Line                 Peripheral IV - Single Lumen 03/23/18 1835 Left Forearm 3 days                Physical Exam   Constitutional: He is oriented to person, place, and time. He appears well-developed and well-nourished.   HENT:   Head: Normocephalic and atraumatic.   Eyes: EOM are normal. Pupils are equal, round, and reactive to light.   Neck: Normal range of motion. Neck supple. No JVD present.   Cardiovascular: Normal rate, regular rhythm and intact distal pulses.    Murmur (holosystolic mumur heard loudes in the 4th and 5th spaces mid-clavicular line) heard.  Pulmonary/Chest: Effort normal and breath sounds normal. He has no wheezes.   Abdominal: Soft. Bowel sounds are normal. There is no tenderness.   Musculoskeletal: Normal range of motion. He exhibits no edema.   Neurological: He is alert and oriented to person, place, and time. He has normal reflexes.   Vitals reviewed.      Significant Labs:   CMP   Recent Labs  Lab 03/26/18  0451 03/27/18  0416    140   K 4.8 4.8    108   CO2 23 23    82   BUN 56* 47*   CREATININE 1.7* 1.6*   CALCIUM 9.7  9.5   ANIONGAP 9 9   ESTGFRAFRICA 44.3* 47.7*   EGFRNONAA 38.3* 41.2*   , CBC   Recent Labs  Lab 03/25/18  1621 03/26/18  0451   WBC 6.04 5.56   HGB 10.8* 11.0*   HCT 31.3* 32.9*    160   , Lipid Panel No results for input(s): CHOL, HDL, LDLCALC, TRIG, CHOLHDL in the last 48 hours., Troponin No results for input(s): TROPONINI in the last 48 hours. and All pertinent lab results from the last 24 hours have been reviewed.    Significant Imaging: Echocardiogram:   2D echo with color flow doppler:   Results for orders placed or performed during the hospital encounter of 03/19/18   2D echo with color flow doppler   Result Value Ref Range    EF 50 55 - 65    Mitral Valve Regurgitation MILD     Diastolic Dysfunction No     Mitral Valve Mobility NORMAL     and X-Ray: CXR: X-Ray Chest 1 View (CXR):   Results for orders placed or performed during the hospital encounter of 03/19/18   X-Ray Chest 1 View    Narrative    EXAMINATION:  XR CHEST 1 VIEW    CLINICAL HISTORY:  SOB;    TECHNIQUE:  Single frontal view of the chest was performed.    COMPARISON:  03/22/2003    FINDINGS:  Post-operative changes are again identified in the thorax.  Heart size and pulmonary vascularity are within normal limits.  Lungs are well expanded and clear.  No pleural fluid or pneumothorax.  Degenerative changes involving the thoracic spine and both AC joints.      Impression    No active cardiopulmonary disease and no detrimental interval change      Electronically signed by: Ayad Baptiste MD  Date:    03/20/2018  Time:    15:41

## 2018-03-27 NOTE — ASSESSMENT & PLAN NOTE
Now stable with Cr 1.6  Will monitor as this could worsen with contrast after [facility]  Holding fosinopril for hyperkalemia (3% of patients taking fosinopril develop hyerkalemia)

## 2018-03-27 NOTE — ASSESSMENT & PLAN NOTE
Seen by ENT  Packing removed 3/26- discontinued antibiotics  - Nasal saline to bilateral nares q 4 hrs   - Avoid nasal cannula; recommend humidified 02   - continue 10 day course of moxifloxacin, prescribed for sinusitis, as this could have precipitated the nosebleed

## 2018-03-27 NOTE — ASSESSMENT & PLAN NOTE
Bedside preliminary echocardiogram performed by cards fellow: EF 25-30% with inferolateral wall hypokinesis/akinesis. Mid and apical anterolateral wall akinesis. RAP<3, Moderate MR and TR. Official read revealed decrease in EF to 50-55% and hypokinesis in the following areas: mid anteroseptum, apical septum.  - Aspirin, 81 mg PO continue plavix   - BB <70 and treat for goal BP<130/80.   - C/w imdur 120 mg daily   - Transitioned to carvedilol 6.25 mg BID for HTN  - c/w lipitor 80 mg daily  - PET stress resulted as follows:     CONCLUSIONS: ABNORMAL MYOCARDIAL PERFUSION PET STRESS TEST  1. There is a moderate sized mild to moderate ischemia in the base to distal inferolateral wall. This defect comprises 15 % of the left ventricular myocardium.   2. Resting wall motion is physiologic. Stress wall motion is physiologic.   3. LV function is normal at rest and stress.  (normal is >= 51%)  4. The ventricular volumes are normal at rest and stress.   5. The extracardiac distribution of radioactivity is normal.   6. When compared to the previous study from 12/8/16, there is no signficant change.    - Patient to undergo LHC 3/27, then likely discharge home 3/28

## 2018-03-27 NOTE — SUBJECTIVE & OBJECTIVE
Interval History: Patient's LHC was rescheduled to to concern over elevated creatinine (which likely represents new baseline). However, will undergo LHC today. No chest pain or shortness of breath.     Review of Systems   Constitutional: Negative for chills and fever.   Respiratory: Negative for cough and shortness of breath.    Cardiovascular: Negative for chest pain and palpitations.   Gastrointestinal: Negative for abdominal pain, diarrhea, nausea and vomiting.   Genitourinary: Negative for dysuria and hematuria.   Musculoskeletal: Negative for back pain.   Neurological: Negative for light-headedness and headaches.   Psychiatric/Behavioral: Negative for agitation and behavioral problems.     Objective:     Vital Signs (Most Recent):  Temp: 97.5 °F (36.4 °C) (03/27/18 0756)  Pulse: (!) 47 (03/27/18 1100)  Resp: 12 (03/27/18 0756)  BP: (!) 163/73 (03/27/18 0756)  SpO2: (!) 94 % (03/27/18 0756) Vital Signs (24h Range):  Temp:  [97.5 °F (36.4 °C)-98.1 °F (36.7 °C)] 97.5 °F (36.4 °C)  Pulse:  [47-90] 47  Resp:  [12-18] 12  SpO2:  [93 %-97 %] 94 %  BP: (129-169)/(64-73) 163/73     Weight: 84.1 kg (185 lb 6.5 oz)  Body mass index is 30.85 kg/m².    Intake/Output Summary (Last 24 hours) at 03/27/18 1334  Last data filed at 03/27/18 0600   Gross per 24 hour   Intake           912.29 ml   Output             1375 ml   Net          -462.71 ml      Physical Exam   Constitutional: He is oriented to person, place, and time. He appears well-developed and well-nourished. No distress.   HENT:   Head: Normocephalic and atraumatic.   Mouth/Throat: Oropharynx is clear and moist.   Eyes: Conjunctivae and EOM are normal.   Neck: Normal range of motion. Neck supple.   Cardiovascular: Normal rate, regular rhythm and normal heart sounds.  Exam reveals no friction rub.    No murmur heard.  Pulmonary/Chest: Effort normal and breath sounds normal. No respiratory distress. He has no wheezes. He has no rales.   Abdominal: Soft. Bowel sounds  are normal. He exhibits no distension. There is no tenderness. There is no guarding.   Musculoskeletal: Normal range of motion. He exhibits no edema.   Neurological: He is alert and oriented to person, place, and time.   Skin: Skin is warm and dry. No rash noted. He is not diaphoretic.   Psychiatric: He has a normal mood and affect. His behavior is normal.       Significant Labs:   CBC:   Recent Labs  Lab 03/25/18  1621 03/26/18  0451   WBC 6.04 5.56   HGB 10.8* 11.0*   HCT 31.3* 32.9*    160     CMP:   Recent Labs  Lab 03/26/18  0451 03/27/18  0416    140   K 4.8 4.8    108   CO2 23 23    82   BUN 56* 47*   CREATININE 1.7* 1.6*   CALCIUM 9.7 9.5   ANIONGAP 9 9   EGFRNONAA 38.3* 41.2*       Significant Imaging: I have reviewed all pertinent imaging results/findings within the past 24 hours.

## 2018-03-27 NOTE — PLAN OF CARE
Rounded with IM3. Patient getting LHC today and will probably discharge home tomorrow. Hospital follow up scheduled in [facility] with Dr Miranda, 4/3/18 at 8am.

## 2018-03-27 NOTE — PLAN OF CARE
Problem: Patient Care Overview  Goal: Plan of Care Review  Outcome: Ongoing (interventions implemented as appropriate)  Patient A&O x 4. Pleasant and cooperative with care. Vitals stable. Heparin gtt infusing. NPO at midnight for heart cath later today, time TBD. No complaints of pain or discomfort. No signs of bleeding. No complaints or acute changes overnight.

## 2018-03-27 NOTE — PROCEDURES
"    Post Cath Note  Referring Physician: Abdi Watson MD  Procedure: Left heart cath (Left)       Access: Left radial  SVG to OM2 not found  LM 75% stenosis  LIMA to LAD patent  RCA non-obstructive disease  Porcelain Aorta    See full report for further details    Intervention:     Closure device: Radial band    Post Cath Exam:   BP (!) 163/73 (Patient Position: Sitting)   Pulse (!) 47   Temp 97.5 °F (36.4 °C) (Oral)   Resp 12   Ht 5' 5" (1.651 m)   Wt 84.1 kg (185 lb 6.5 oz)   SpO2 (!) 94%   BMI 30.85 kg/m²   No unusual pain, hematoma, thrill or bruit at vascular access site.  Distal pulse present without signs of ischemia.    Recommendations:   - Routine post-cath care  - IVF at 1.5 cc/kg/hr for 4 hrs  - may consider LM PCI in the future as outpatient, Continue medical management, Risk factor reduction, ASA 81 mg indefinitely, can stop plavix for now,     Follow-up with Dr. Vences as outpatient in 1 month,    Fito Ramirez MD, PGY-4    "

## 2018-03-27 NOTE — ASSESSMENT & PLAN NOTE
Patient is a 76 yo male with a past medical hx significant for CAD (s/p CABG in 2002 - LIMA-LAD, SVG-OM2, multiple PCIs), AS (s/p mechanical AVR), HTN, HLD, PAD, T2DM, CKD III here with a NSTEMI superimposed upon his SWAPNIL on CKD and hypovolemia secondary to acute diarrhea. His Swapnil has stablized. Consented for LHC and will carry LHC today.    Plan:  - Consented and made NPO.   - Now on heparin gtt  -The risks, benefits & alternatives of the procedure were explained to the patient. Staff will discuss with patient    -The risks of coronary angiography include but are not limited to:  Bleeding, infection, heart rhythm abnormalities, allergic reactions, kidney injury, stroke and death.    -Should stenting be indicated, the patient has agreed to dual anti-platelet therapy for 1-consecutive year with a drug-eluting stent and a minimum of 1-month with the use of a bare metal stent.    -The risks of moderate sedation include hypotension, respiratory depression, arrhythmias, bronchospasm, & death.    -Informed consent was obtained & the patient is agreeable to proceed with the procedure.  -This patient was discussed with the attending interventional cardiologist who agrees with the above assessment & plan.

## 2018-03-27 NOTE — PROGRESS NOTES
[facility] Hospital Medicine  Progress Note    Patient Name: Dariel Urbina  MRN: [id number]  Patient Class: IP- Inpatient   Admission Date: 3/19/2018  Length of Stay: 8 days  Attending Physician: Abdi Watson MD  Primary Care Provider: Devon Langston MD    [facility] Medicine Team: [facility] 3 Liz Mata DO    Subjective:     Principal Problem:Acute renal failure superimposed on stage 3 chronic kidney disease    HPI:  Mr Urbina is a 78 yo male with PMH significant for HTN, CKD III, HLD, CAD, DMT2, gout, mechanical aortic valve, diastolic heart failure, and history of left-sided colectomy for diverticulosis who presents with diarrhea of three day duration with sudden onset. Diarrhea is watery nigh on clear without any mucus or blood. Pt c/o greater than 6 episodes of diarrhea per day for the past three days. Associated symptoms include weakness, fatigue and decreased urine output. He has not urinated for the past two days. He denies any abdominal pain or cramps currently. Pt has tried immodium, kaopectate and pepto bismal without any symptomatic benefit. He has never had an episode of diarrhea like this before. Pt cannot attribute this episode of diarrhea to any particular cause.     He denies any recent changes in diet or recent travel. Denies sick contacts, fevers, chills, headache, or confusion. Denies recent antibiotics or hospitalizations. Denies changes to medications. Review of systems was positive for shortness of breath, cough while eating, and decreased urine output. Pt also endorses a decreased appetite over the past two months resulting in an unintentional twenty pound weight loss.     Hospital Course:  Mr Urbina is a 78 yo male who presented for diarrhea resulting in prerenal acute kidney injury and non-anion gap metabolic acidosis. He was treated for both with IV hydration, initially with lactated ringer's to avoid superimposing hyperchloremic metabolic  acidosis, however he was then noted to have hyperkalemia and thus was transitioned to normal saline. Hyperkalemia was treated with kayexelate and shifting. During treatment of hyperkalemia, pt developed chest pain that was evaluated with troponin and EKG. Overnight, his troponinemia worsened enough to raise concern for NSTEMI. He was evaluated with PET stress test which revealed inferolateral wall ischemia. He was planned for inpatient LHC for coronary evaluation on 3/26, however this was postponed to 3/27.     Interval History: Patient's LHC was rescheduled to to concern over elevated creatinine (which likely represents new baseline). However, will undergo LHC today. No chest pain or shortness of breath.     Review of Systems   Constitutional: Negative for chills and fever.   Respiratory: Negative for cough and shortness of breath.    Cardiovascular: Negative for chest pain and palpitations.   Gastrointestinal: Negative for abdominal pain, diarrhea, nausea and vomiting.   Genitourinary: Negative for dysuria and hematuria.   Musculoskeletal: Negative for back pain.   Neurological: Negative for light-headedness and headaches.   Psychiatric/Behavioral: Negative for agitation and behavioral problems.     Objective:     Vital Signs (Most Recent):  Temp: 97.5 °F (36.4 °C) (03/27/18 0756)  Pulse: (!) 47 (03/27/18 1100)  Resp: 12 (03/27/18 0756)  BP: (!) 163/73 (03/27/18 0756)  SpO2: (!) 94 % (03/27/18 0756) Vital Signs (24h Range):  Temp:  [97.5 °F (36.4 °C)-98.1 °F (36.7 °C)] 97.5 °F (36.4 °C)  Pulse:  [47-90] 47  Resp:  [12-18] 12  SpO2:  [93 %-97 %] 94 %  BP: (129-169)/(64-73) 163/73     Weight: 84.1 kg (185 lb 6.5 oz)  Body mass index is 30.85 kg/m².    Intake/Output Summary (Last 24 hours) at 03/27/18 1334  Last data filed at 03/27/18 0600   Gross per 24 hour   Intake           912.29 ml   Output             1375 ml   Net          -462.71 ml      Physical Exam   Constitutional: He is oriented to person, place, and  time. He appears well-developed and well-nourished. No distress.   HENT:   Head: Normocephalic and atraumatic.   Mouth/Throat: Oropharynx is clear and moist.   Eyes: Conjunctivae and EOM are normal.   Neck: Normal range of motion. Neck supple.   Cardiovascular: Normal rate, regular rhythm and normal heart sounds.  Exam reveals no friction rub.    No murmur heard.  Pulmonary/Chest: Effort normal and breath sounds normal. No respiratory distress. He has no wheezes. He has no rales.   Abdominal: Soft. Bowel sounds are normal. He exhibits no distension. There is no tenderness. There is no guarding.   Musculoskeletal: Normal range of motion. He exhibits no edema.   Neurological: He is alert and oriented to person, place, and time.   Skin: Skin is warm and dry. No rash noted. He is not diaphoretic.   Psychiatric: He has a normal mood and affect. His behavior is normal.       Significant Labs:   CBC:   Recent Labs  Lab 03/25/18  1621 03/26/18  0451   WBC 6.04 5.56   HGB 10.8* 11.0*   HCT 31.3* 32.9*    160     CMP:   Recent Labs  Lab 03/26/18  0451 03/27/18  0416    140   K 4.8 4.8    108   CO2 23 23    82   BUN 56* 47*   CREATININE 1.7* 1.6*   CALCIUM 9.7 9.5   ANIONGAP 9 9   EGFRNONAA 38.3* 41.2*       Significant Imaging: I have reviewed all pertinent imaging results/findings within the past 24 hours.    Assessment/Plan:      * Acute renal failure superimposed on stage 3 chronic kidney disease    Now stable with Cr 1.6  Will monitor as this could worsen with contrast after [facility]  Holding fosinopril for hyperkalemia (3% of patients taking fosinopril develop hyerkalemia)        NSTEMI (non-ST elevated myocardial infarction)    Bedside preliminary echocardiogram performed by cards fellow: EF 25-30% with inferolateral wall hypokinesis/akinesis. Mid and apical anterolateral wall akinesis. RAP<3, Moderate MR and TR. Official read revealed decrease in EF to 50-55% and hypokinesis in the following areas:  mid anteroseptum, apical septum.  - Aspirin, 81 mg PO  - BB <70 and treat for goal BP<130/80.   - C/w imdur 120 mg daily   - Transitioned to carvedilol 6.25 mg BID for HTN  - c/w lipitor 80 mg daily  - PET stress resulted as follows:     CONCLUSIONS: ABNORMAL MYOCARDIAL PERFUSION PET STRESS TEST  1. There is a moderate sized mild to moderate ischemia in the base to distal inferolateral wall. This defect comprises 15 % of the left ventricular myocardium.   2. Resting wall motion is physiologic. Stress wall motion is physiologic.   3. LV function is normal at rest and stress.  (normal is >= 51%)  4. The ventricular volumes are normal at rest and stress.   5. The extracardiac distribution of radioactivity is normal.   6. When compared to the previous study from 12/8/16, there is no signficant change.    - Patient to undergo LHC 3/27, then likely discharge home 3/28  - add losartan 25mg daily (cannot add back ace inhibitor as this has caused hyperkalemia in the past)  - discontinued plavix  - continue heparin for now while bridging back to warfarin          Bleeding nose    Seen by ENT  Packing removed 3/26- discontinued antibiotics  - Nasal saline to bilateral nares q 4 hrs   - Avoid nasal cannula; recommend humidified 02   - continue 10 day course of moxifloxacin, prescribed for sinusitis, as this could have precipitated the nosebleed        H/O mechanical aortic valve replacement    LONG TERM CURRENT USE OF ANTICOAGULATION THERAPY    Pt on anticoagulation at home with daily warfarin. INR was 4.4 on admission. Has decreased PO intake for the past two months. INR 1.4 today. Will re-initiate warfarin.   - One dose this AM with additional dose in the afternoon  - Warfarin 5 mg every Sun, Mon, Wed, Fri  - Warfarin 7.5 mg every Tue, Thu, Sat  - Target INR of 2.5 (range 2.0 to 3.0)    -holding anticoagulation, as patient is on heparin. Will restart after LHC        Metabolic acidosis with normal anion gap and bicarbonate  losses    Associated with severe diarrhea and kidney failure now improved  - continue bicarbonate gtt        CAD (coronary atherosclerotic disease)    PERIPHERAL VASCULAR DISEASE    Takes high-intensity statin. Also has nitroglycerin 0.4 mg PRN. Denies every requiring nitro. Nil aspirin in the outpatient setting.   - Aspirin 81 mg  - Plavix 300 then 75 mg  - Atorvastatin 80 mg QD  - Isosorbide mononitrate 120 mg qd                Gastroesophageal reflux disease without esophagitis    On H2-inhibitor, famotidine at home.   - Will hold in setting of JIM  - Will restart on discharge          Obesity, diabetes, and hypertension syndrome    Pt reports 20 lbs of unintentional weight loss d/t decreased appetite   - Cardiac diet  - Follow up decreased appetite and weight loss in OP setting          Hyperparathyroidism due to renal insufficiency              Type 2 diabetes mellitus with diabetic peripheral angiopathy without gangrene    A1c was 5.5. On glimiperide at home.   - Low-dose SSI           Left ventricular diastolic dysfunction with preserved systolic function    ACE-inhibitor and furosemide at home. Held in setting of prerenal JIM.             Anemia of chronic renal failure, stage 3 (moderate)              CKD (chronic kidney disease), stage III    HYPERPARATHYROIDISM DUE TO RENAL INSUFFICIENCY  ANEMIA OF CHRONIC RENAL FAILURE, STAGE 3    GFR is usually in the 50's in the setting of high blood pressure. Corrected calcium was 10.0 mg/dL.  - Acute renal failure has resolved   - Continue ferrous sulfate, 325 mg qd (per outpatient regimen)          History of gout    Allopurinol 100 mg qd          Renovascular hypertension    Home regimen includes fosinopril 40 mg qd, nifedipine 30 mg qd.  - Will hold fosinopril in setting of JIM  - Continue nifedipine  - Will hold fosinopril d/t difficult to manage hyperkalemia            Hyperlipidemia    On fenofibrate 200 mg at home and atorvastatin 80 mg qd  - Will continue  statin          Long term current use of anticoagulant therapy              PVD (peripheral vascular disease)                VTE Risk Mitigation         Ordered     heparin 25,000 units in dextrose 5% 250 mL (100 units/mL) infusion  Continuous     Route:  Intravenous        03/25/18 1614              Liz Mata DO  Department of Hospital Medicine   [facility]

## 2018-03-27 NOTE — PROGRESS NOTES
[facility]  Interventional Cardiology  Progress Note    Patient Name: Dariel Urbina  MRN: [id number]  Admission Date: 3/19/2018  Hospital Length of Stay: 8 days  Code Status: Full Code   Attending Physician: Abdi Watson MD   Primary Care Physician: Devon Langston MD  Principal Problem:Acute renal failure superimposed on stage 3 chronic kidney disease    Subjective:     Interval History:   Consented for [facility] today. No complaints of chest pain or SOB. Left radial access.     Objective:     Vital Signs (Most Recent):  Temp: 97.5 °F (36.4 °C) (03/27/18 0756)  Pulse: 63 (03/27/18 0800)  Resp: 12 (03/27/18 0756)  BP: (!) 163/73 (03/27/18 0756)  SpO2: (!) 94 % (03/27/18 0756) Vital Signs (24h Range):  Temp:  [97.5 °F (36.4 °C)-98.1 °F (36.7 °C)] 97.5 °F (36.4 °C)  Pulse:  [50-90] 63  Resp:  [12-18] 12  SpO2:  [93 %-97 %] 94 %  BP: (129-169)/(63-73) 163/73     Weight: 84.1 kg (185 lb 6.5 oz)  Body mass index is 30.85 kg/m².    SpO2: (!) 94 %  O2 Device (Oxygen Therapy): room air      Intake/Output Summary (Last 24 hours) at 03/27/18 0907  Last data filed at 03/27/18 0600   Gross per 24 hour   Intake           912.29 ml   Output             2275 ml   Net         -1362.71 ml       Lines/Drains/Airways     Peripheral Intravenous Line                 Peripheral IV - Single Lumen 03/23/18 1835 Left Forearm 3 days                Physical Exam   Constitutional: He is oriented to person, place, and time. He appears well-developed and well-nourished.   HENT:   Head: Normocephalic and atraumatic.   Eyes: EOM are normal. Pupils are equal, round, and reactive to light.   Neck: Normal range of motion. Neck supple. No JVD present.   Cardiovascular: Normal rate, regular rhythm and intact distal pulses.    Murmur (holosystolic mumur heard loudes in the 4th and 5th spaces mid-clavicular line) heard.  Pulmonary/Chest: Effort normal and breath sounds normal. He has no wheezes.   Abdominal: Soft. Bowel sounds are  normal. There is no tenderness.   Musculoskeletal: Normal range of motion. He exhibits no edema.   Neurological: He is alert and oriented to person, place, and time. He has normal reflexes.   Vitals reviewed.      Significant Labs:   CMP   Recent Labs  Lab 03/26/18  0451 03/27/18  0416    140   K 4.8 4.8    108   CO2 23 23    82   BUN 56* 47*   CREATININE 1.7* 1.6*   CALCIUM 9.7 9.5   ANIONGAP 9 9   ESTGFRAFRICA 44.3* 47.7*   EGFRNONAA 38.3* 41.2*   , CBC   Recent Labs  Lab 03/25/18  1621 03/26/18  0451   WBC 6.04 5.56   HGB 10.8* 11.0*   HCT 31.3* 32.9*    160   , Lipid Panel No results for input(s): CHOL, HDL, LDLCALC, TRIG, CHOLHDL in the last 48 hours., Troponin No results for input(s): TROPONINI in the last 48 hours. and All pertinent lab results from the last 24 hours have been reviewed.    Significant Imaging: Echocardiogram:   2D echo with color flow doppler:   Results for orders placed or performed during the hospital encounter of 03/19/18   2D echo with color flow doppler   Result Value Ref Range    EF 50 55 - 65    Mitral Valve Regurgitation MILD     Diastolic Dysfunction No     Mitral Valve Mobility NORMAL     and X-Ray: CXR: X-Ray Chest 1 View (CXR):   Results for orders placed or performed during the hospital encounter of 03/19/18   X-Ray Chest 1 View    Narrative    EXAMINATION:  XR CHEST 1 VIEW    CLINICAL HISTORY:  SOB;    TECHNIQUE:  Single frontal view of the chest was performed.    COMPARISON:  03/22/2003    FINDINGS:  Post-operative changes are again identified in the thorax.  Heart size and pulmonary vascularity are within normal limits.  Lungs are well expanded and clear.  No pleural fluid or pneumothorax.  Degenerative changes involving the thoracic spine and both AC joints.      Impression    No active cardiopulmonary disease and no detrimental interval change      Electronically signed by: Ayad Baptiste MD  Date:    03/20/2018  Time:    15:41     Assessment and Plan:      Patient is a 77 y.o. male presenting with:    NSTEMI (non-ST elevated myocardial infarction)    Patient is a 78 yo male with a past medical hx significant for CAD (s/p CABG in 2002 - LIMA-LAD, SVG-OM2, multiple PCIs), AS (s/p mechanical AVR), HTN, HLD, PAD, T2DM, CKD III here with a NSTEMI superimposed upon his SWAPNIL on CKD and hypovolemia secondary to acute diarrhea. His Swapnil has stablized. Consented for LHC and will carry LHC today.    Plan:  - Consented and made NPO.   - Now on heparin gtt  -The risks, benefits & alternatives of the procedure were explained to the patient. Staff will discuss with patient    -The risks of coronary angiography include but are not limited to:  Bleeding, infection, heart rhythm abnormalities, allergic reactions, kidney injury, stroke and death.    -Should stenting be indicated, the patient has agreed to dual anti-platelet therapy for 1-consecutive year with a drug-eluting stent and a minimum of 1-month with the use of a bare metal stent.    -The risks of moderate sedation include hypotension, respiratory depression, arrhythmias, bronchospasm, & death.    -Informed consent was obtained & the patient is agreeable to proceed with the procedure.  -This patient was discussed with the attending interventional cardiologist who agrees with the above assessment & plan.             Metabolic acidosis with normal anion gap and bicarbonate losses    Relatively resolved bicarb this morning is 23        Type 2 diabetes mellitus with diabetic peripheral angiopathy without gangrene    Last hb1ac is A1c was 5.5. On glimiperide at home.   Defer to primary team.        Left ventricular diastolic dysfunction with preserved systolic function    See NSTEMI        Renovascular hypertension    Would restart patient's fosinopril 40 mg for better BP control        Hyperlipidemia    C/w high intensity statin        CAD (coronary atherosclerotic disease)    See NSTEMI            VTE Risk Mitigation          Ordered     heparin 25,000 units in dextrose 5% 250 mL (100 units/mL) infusion  Continuous     Route:  Intravenous        03/25/18 6014          Fito Ramirez MD  Interventional Cardiology  [facility]

## 2018-03-28 VITALS
DIASTOLIC BLOOD PRESSURE: 64 MMHG | WEIGHT: 185.44 LBS | HEIGHT: 65 IN | RESPIRATION RATE: 12 BRPM | SYSTOLIC BLOOD PRESSURE: 147 MMHG | BODY MASS INDEX: 30.89 KG/M2 | OXYGEN SATURATION: 95 % | TEMPERATURE: 98 F | HEART RATE: 54 BPM

## 2018-03-28 DIAGNOSIS — I25.10 CORONARY ARTERY DISEASE, ANGINA PRESENCE UNSPECIFIED, UNSPECIFIED VESSEL OR LESION TYPE, UNSPECIFIED WHETHER NATIVE OR TRANSPLANTED HEART: Primary | ICD-10-CM

## 2018-03-28 LAB
ANION GAP SERPL CALC-SCNC: 7 MMOL/L
BASOPHILS # BLD AUTO: 0.04 K/UL
BASOPHILS NFR BLD: 0.6 %
BUN SERPL-MCNC: 39 MG/DL
CALCIUM SERPL-MCNC: 9.4 MG/DL
CHLORIDE SERPL-SCNC: 109 MMOL/L
CO2 SERPL-SCNC: 23 MMOL/L
CREAT SERPL-MCNC: 1.6 MG/DL
DIFFERENTIAL METHOD: ABNORMAL
EOSINOPHIL # BLD AUTO: 0.1 K/UL
EOSINOPHIL NFR BLD: 1.7 %
ERYTHROCYTE [DISTWIDTH] IN BLOOD BY AUTOMATED COUNT: 13.3 %
EST. GFR  (AFRICAN AMERICAN): 47.7 ML/MIN/1.73 M^2
EST. GFR  (NON AFRICAN AMERICAN): 41.2 ML/MIN/1.73 M^2
GLUCOSE SERPL-MCNC: 102 MG/DL
HCT VFR BLD AUTO: 32.7 %
HGB BLD-MCNC: 10.7 G/DL
IMM GRANULOCYTES # BLD AUTO: 0.02 K/UL
IMM GRANULOCYTES NFR BLD AUTO: 0.3 %
INR PPP: 1.3
LYMPHOCYTES # BLD AUTO: 1.9 K/UL
LYMPHOCYTES NFR BLD: 26.5 %
MCH RBC QN AUTO: 30.7 PG
MCHC RBC AUTO-ENTMCNC: 32.7 G/DL
MCV RBC AUTO: 94 FL
MONOCYTES # BLD AUTO: 0.7 K/UL
MONOCYTES NFR BLD: 10 %
NEUTROPHILS # BLD AUTO: 4.3 K/UL
NEUTROPHILS NFR BLD: 60.9 %
NRBC BLD-RTO: 0 /100 WBC
PLATELET # BLD AUTO: 157 K/UL
PLATELET RESPONSE PLAVIX: 262 PRU
PMV BLD AUTO: 10.8 FL
POC ACTIVATED CLOTTING TIME K: 235 SEC (ref 74–137)
POTASSIUM SERPL-SCNC: 5.2 MMOL/L
PROTHROMBIN TIME: 13.4 SEC
RBC # BLD AUTO: 3.48 M/UL
SAMPLE: ABNORMAL
SODIUM SERPL-SCNC: 139 MMOL/L
WBC # BLD AUTO: 7.1 K/UL

## 2018-03-28 PROCEDURE — 85576 BLOOD PLATELET AGGREGATION: CPT

## 2018-03-28 PROCEDURE — 25000003 PHARM REV CODE 250: Performed by: STUDENT IN AN ORGANIZED HEALTH CARE EDUCATION/TRAINING PROGRAM

## 2018-03-28 PROCEDURE — 82272 OCCULT BLD FECES 1-3 TESTS: CPT

## 2018-03-28 PROCEDURE — 63600175 PHARM REV CODE 636 W HCPCS: Performed by: STUDENT IN AN ORGANIZED HEALTH CARE EDUCATION/TRAINING PROGRAM

## 2018-03-28 PROCEDURE — 36415 COLL VENOUS BLD VENIPUNCTURE: CPT

## 2018-03-28 PROCEDURE — 93010 ELECTROCARDIOGRAM REPORT: CPT | Mod: ,,, | Performed by: INTERNAL MEDICINE

## 2018-03-28 PROCEDURE — 99238 HOSP IP/OBS DSCHRG MGMT 30/<: CPT | Mod: GC,,, | Performed by: HOSPITALIST

## 2018-03-28 PROCEDURE — 93005 ELECTROCARDIOGRAM TRACING: CPT

## 2018-03-28 PROCEDURE — 85025 COMPLETE CBC W/AUTO DIFF WBC: CPT

## 2018-03-28 PROCEDURE — 85610 PROTHROMBIN TIME: CPT

## 2018-03-28 PROCEDURE — 80048 BASIC METABOLIC PNL TOTAL CA: CPT

## 2018-03-28 PROCEDURE — 25000003 PHARM REV CODE 250: Performed by: INTERNAL MEDICINE

## 2018-03-28 RX ORDER — CARVEDILOL 6.25 MG/1
12.5 TABLET ORAL 2 TIMES DAILY
Qty: 60 TABLET | Refills: 3 | Status: SHIPPED | OUTPATIENT
Start: 2018-03-28 | End: 2018-04-09 | Stop reason: SDUPTHER

## 2018-03-28 RX ORDER — ENOXAPARIN SODIUM 150 MG/ML
1.5 INJECTION SUBCUTANEOUS DAILY
Qty: 4.8 ML | Refills: 0 | Status: ON HOLD | OUTPATIENT
Start: 2018-03-29 | End: 2018-04-04

## 2018-03-28 RX ORDER — CARVEDILOL 12.5 MG/1
12.5 TABLET ORAL 2 TIMES DAILY
Status: DISCONTINUED | OUTPATIENT
Start: 2018-03-28 | End: 2018-03-28 | Stop reason: HOSPADM

## 2018-03-28 RX ORDER — NIFEDIPINE 30 MG/1
60 TABLET, EXTENDED RELEASE ORAL DAILY
Qty: 60 TABLET | Refills: 4 | Status: SHIPPED | OUTPATIENT
Start: 2018-03-28 | End: 2018-04-09 | Stop reason: SDUPTHER

## 2018-03-28 RX ORDER — NIFEDIPINE 60 MG/1
60 TABLET, EXTENDED RELEASE ORAL DAILY
Status: DISCONTINUED | OUTPATIENT
Start: 2018-03-29 | End: 2018-03-28 | Stop reason: HOSPADM

## 2018-03-28 RX ORDER — CARVEDILOL 6.25 MG/1
6.25 TABLET ORAL 2 TIMES DAILY
Status: DISCONTINUED | OUTPATIENT
Start: 2018-03-28 | End: 2018-03-28

## 2018-03-28 RX ORDER — ISOSORBIDE MONONITRATE 120 MG/1
120 TABLET, EXTENDED RELEASE ORAL DAILY
Qty: 90 TABLET | Refills: 3 | Status: SHIPPED | OUTPATIENT
Start: 2018-03-28 | End: 2018-05-16 | Stop reason: SDUPTHER

## 2018-03-28 RX ORDER — NIFEDIPINE 30 MG/1
30 TABLET, EXTENDED RELEASE ORAL ONCE
Status: COMPLETED | OUTPATIENT
Start: 2018-03-28 | End: 2018-03-28

## 2018-03-28 RX ORDER — METRONIDAZOLE 500 MG/1
500 TABLET ORAL EVERY 12 HOURS
Qty: 5 TABLET | Refills: 0 | Status: ON HOLD | OUTPATIENT
Start: 2018-03-28 | End: 2018-04-02

## 2018-03-28 RX ADMIN — CARVEDILOL 6.25 MG: 6.25 TABLET, FILM COATED ORAL at 10:03

## 2018-03-28 RX ADMIN — ENOXAPARIN SODIUM 80 MG: 150 INJECTION SUBCUTANEOUS at 08:03

## 2018-03-28 RX ADMIN — Medication 1 CAPSULE: at 08:03

## 2018-03-28 RX ADMIN — LOSARTAN POTASSIUM 25 MG: 25 TABLET ORAL at 08:03

## 2018-03-28 RX ADMIN — SODIUM POLYSTYRENE SULFONATE 30 G: 15 SUSPENSION ORAL; RECTAL at 08:03

## 2018-03-28 RX ADMIN — ASPIRIN 81 MG: 81 TABLET, COATED ORAL at 08:03

## 2018-03-28 RX ADMIN — FAMOTIDINE 20 MG: 20 TABLET, FILM COATED ORAL at 08:03

## 2018-03-28 RX ADMIN — NIFEDIPINE 30 MG: 30 TABLET, FILM COATED, EXTENDED RELEASE ORAL at 10:03

## 2018-03-28 RX ADMIN — NIFEDIPINE 30 MG: 30 TABLET, FILM COATED, EXTENDED RELEASE ORAL at 08:03

## 2018-03-28 RX ADMIN — ISOSORBIDE MONONITRATE 120 MG: 60 TABLET, EXTENDED RELEASE ORAL at 08:03

## 2018-03-28 RX ADMIN — FERROUS SULFATE TAB EC 325 MG (65 MG FE EQUIVALENT) 325 MG: 325 (65 FE) TABLET DELAYED RESPONSE at 08:03

## 2018-03-28 RX ADMIN — METRONIDAZOLE 500 MG: 500 TABLET ORAL at 08:03

## 2018-03-28 RX ADMIN — ALLOPURINOL 100 MG: 100 TABLET ORAL at 08:03

## 2018-03-28 RX ADMIN — ATORVASTATIN CALCIUM 80 MG: 20 TABLET, FILM COATED ORAL at 08:03

## 2018-03-28 RX ADMIN — SALINE NASAL SPRAY 1 SPRAY: 1.5 SOLUTION NASAL at 10:03

## 2018-03-28 NOTE — PLAN OF CARE
Pt VSS, Aox4, pt denies pain in radial incision site. Pt tolerated all meds well overnight and plan is for pt to go home today. Safety maintained and WCTM.

## 2018-03-28 NOTE — PLAN OF CARE
Problem: Patient Care Overview  Goal: Plan of Care Review  Outcome: Ongoing (interventions implemented as appropriate)  Patient resting in the recliner. No complaints, he is anxious to go home.

## 2018-03-28 NOTE — ASSESSMENT & PLAN NOTE
LONG TERM CURRENT USE OF ANTICOAGULATION THERAPY    Pt on anticoagulation at home with daily warfarin. INR was 4.4 on admission. Has decreased PO intake for the past two months. INR 1.4 today. Will re-initiate warfarin.   - One dose this AM with additional dose in the afternoon  - Warfarin 5 mg every Sun, Mon, Wed, Fri  - Warfarin 7.5 mg every Tue, Thu, Sat  - Target INR of 2.5 (range 2.0 to 3.0)    There was some concern that patient had melena- however nurse reports that patient's stool was green and not black tarry stool. Hemoglobin was checked and was stable over two day period.     Discharged home with lovenox to bridge to warfarin

## 2018-03-28 NOTE — ASSESSMENT & PLAN NOTE
Now stable with Cr 1.6  Will monitor as this could worsen with contrast after [facility]  Holding fosinopril for hyperkalemia (3% of patients taking fosinopril develop hyerkalemia)

## 2018-03-28 NOTE — NURSING
Patient educated on discharge instructions. IV and tele to be removed. Patient going home under the care of family.

## 2018-03-28 NOTE — DISCHARGE SUMMARY
[facility] Hospital Medicine  Discharge Summary      Patient Name: Dariel Urbina  MRN: [id number]  Admission Date: 3/19/2018  Hospital Length of Stay: 9 days  Discharge Date and Time:  03/28/2018 4:16 PM  Attending Physician: Abdi Watson MD   Discharging Provider: Liz Mata DO  Primary Care Provider: Devon Langston MD  [facility] Medicine Team: [facility] 3 Liz Mata DO    HPI:   Mr Urbina is a 78 yo male with PMH significant for HTN, CKD III, HLD, CAD, DMT2, gout, mechanical aortic valve, diastolic heart failure, and history of left-sided colectomy for diverticulosis who presents with diarrhea of three day duration with sudden onset. Diarrhea is watery nigh on clear without any mucus or blood. Pt c/o greater than 6 episodes of diarrhea per day for the past three days. Associated symptoms include weakness, fatigue and decreased urine output. He has not urinated for the past two days. He denies any abdominal pain or cramps currently. Pt has tried immodium, kaopectate and pepto bismal without any symptomatic benefit. He has never had an episode of diarrhea like this before. Pt cannot attribute this episode of diarrhea to any particular cause.     He denies any recent changes in diet or recent travel. Denies sick contacts, fevers, chills, headache, or confusion. Denies recent antibiotics or hospitalizations. Denies changes to medications. Review of systems was positive for shortness of breath, cough while eating, and decreased urine output. Pt also endorses a decreased appetite over the past two months resulting in an unintentional twenty pound weight loss.     Procedure(s) (LRB):  Coronary angiography (N/A)      Hospital Course:   Mr Urbina is a 78 yo male who presented for diarrhea resulting in prerenal acute kidney injury and non-anion gap metabolic acidosis. He was treated for both with IV hydration, initially with lactated ringer's to avoid superimposing  hyperchloremic metabolic acidosis, however he was then noted to have hyperkalemia and thus was transitioned to normal saline. Hyperkalemia was treated with kayexelate and shifting. During treatment of hyperkalemia, pt developed chest pain that was evaluated with troponin and EKG. Overnight, his troponinemia worsened enough to raise concern for NSTEMI. He was evaluated with PET stress test which revealed inferolateral wall ischemia. He was planned for inpatient LHC for coronary evaluation on 3/26, however this was postponed to 3/27. LHC revealed two vessel disease, which was not intervened. He was discharged home on 3/28 with instructions to follow up with cardiology as an outpatient in one month for consideration of further PCI. He was discharged home with lovenox injections to bridge to warfarin.      Consults:   Consults         Status Ordering Provider     Inpatient consult to Cardiology  Once     Provider:  (Not yet assigned)    Completed MIGUEL GHOSH     Inpatient consult to ENT  Once     Provider:  (Not yet assigned)    Completed TALHA PIZANO     Inpatient consult to Interventional Cardiology  Once     Provider:  (Not yet assigned)    Completed PAULA HERRERA     Inpatient consult to Nephrology  Once     Provider:  (Not yet assigned)    Completed KHAN, BEHRAM        Interval History: Patient underwent LHC yesterday which showed 75% stenosis of the left main corotnary artery, as well as diffuse disease of the left circumflex. No intervention was done. Patient feels well and has no complains. No chest pain.     Review of Systems   Constitutional: Negative for chills and fever.   Respiratory: Negative for cough and shortness of breath.    Cardiovascular: Negative for chest pain and palpitations.   Gastrointestinal: Negative for abdominal pain, diarrhea, nausea and vomiting.   Genitourinary: Negative for dysuria and hematuria.   Musculoskeletal: Negative for back pain.   Neurological: Negative  for light-headedness and headaches.   Psychiatric/Behavioral: Negative for agitation and behavioral problems.     Objective:     Vital Signs (Most Recent):  Temp: 97.6 °F (36.4 °C) (03/28/18 1157)  Pulse: (!) 54 (03/28/18 1157)  Resp: 12 (03/28/18 0727)  BP: (!) 147/64 (03/28/18 1157)  SpO2: 95 % (03/28/18 1157) Vital Signs (24h Range):  Temp:  [97.5 °F (36.4 °C)-98.5 °F (36.9 °C)] 97.6 °F (36.4 °C)  Pulse:  [51-82] 54  Resp:  [12-18] 12  SpO2:  [93 %-98 %] 95 %  BP: (140-164)/(62-71) 147/64     Weight: 84.1 kg (185 lb 6.5 oz)  Body mass index is 30.85 kg/m².    Intake/Output Summary (Last 24 hours) at 03/28/18 1608  Last data filed at 03/27/18 1800   Gross per 24 hour   Intake           549.99 ml   Output             1200 ml   Net          -650.01 ml      Physical Exam   Constitutional: He is oriented to person, place, and time. He appears well-developed and well-nourished. No distress.   HENT:   Head: Normocephalic and atraumatic.   Mouth/Throat: Oropharynx is clear and moist.   Eyes: Conjunctivae and EOM are normal.   Neck: Normal range of motion. Neck supple.   Cardiovascular: Normal rate, regular rhythm and normal heart sounds.  Exam reveals no friction rub.    No murmur heard.  Pulmonary/Chest: Effort normal and breath sounds normal. No respiratory distress. He has no wheezes. He has no rales.   Abdominal: Soft. Bowel sounds are normal. He exhibits no distension. There is no tenderness. There is no guarding.   Musculoskeletal: Normal range of motion. He exhibits no edema.   Neurological: He is alert and oriented to person, place, and time.   Skin: Skin is warm and dry. No rash noted. He is not diaphoretic.   Psychiatric: He has a normal mood and affect. His behavior is normal.       Significant Labs:   CBC:   Recent Labs  Lab 03/28/18  1116   WBC 7.10   HGB 10.7*   HCT 32.7*        CMP:   Recent Labs  Lab 03/27/18  0416 03/28/18  0453    139   K 4.8 5.2*    109   CO2 23 23   GLU 82 102   BUN  47* 39*   CREATININE 1.6* 1.6*   CALCIUM 9.5 9.4   ANIONGAP 9 7*   EGFRNONAA 41.2* 41.2*       Significant Imaging: I have reviewed all pertinent imaging results/findings within the past 24 hours.        * Acute renal failure superimposed on stage 3 chronic kidney disease    Now stable with Cr 1.6  Will monitor as this could worsen with contrast after LHC  Holding fosinopril for hyperkalemia (3% of patients taking fosinopril develop hyerkalemia)        NSTEMI (non-ST elevated myocardial infarction)    HYPERTENSION  HYPERKALEMIA    Bedside preliminary echocardiogram performed by cards fellow: EF 25-30% with inferolateral wall hypokinesis/akinesis. Mid and apical anterolateral wall akinesis. RAP<3, Moderate MR and TR. Official read revealed decrease in EF to 50-55% and hypokinesis in the following areas: mid anteroseptum, apical septum.  - Aspirin, 81 mg PO  - BB <70 and treat for goal BP<130/80.   - C/w imdur 120 mg daily   - Transitioned to carvedilol 6.25 mg BID for HTN  - c/w lipitor 80 mg daily  - PET stress resulted as follows:     CONCLUSIONS: ABNORMAL MYOCARDIAL PERFUSION PET STRESS TEST  1. There is a moderate sized mild to moderate ischemia in the base to distal inferolateral wall. This defect comprises 15 % of the left ventricular myocardium.   2. Resting wall motion is physiologic. Stress wall motion is physiologic.   3. LV function is normal at rest and stress.  (normal is >= 51%)  4. The ventricular volumes are normal at rest and stress.   5. The extracardiac distribution of radioactivity is normal.   6. When compared to the previous study from 12/8/16, there is no signficant change.    LHC: 75% stenosis of left main, and diffuse disease of left circumflex. No intervention done    Attempted to start Losartan on 3/27, however this caused the patient to become hyperkalemic  Changed regimen to increase nifedipine, and forego ace/arb (as these cause hyperkalemia in this patient)  Given one dose kayexalate  before discharge  Discharged home on plavix/warfarin          Bleeding nose    Seen by ENT  Packing removed 3/26- discontinued antibiotics  - Nasal saline to bilateral nares q 4 hrs   - Avoid nasal cannula; recommend humidified 02   - continue 10 day course of moxifloxacin, prescribed for sinusitis, as this could have precipitated the nosebleed        H/O mechanical aortic valve replacement    LONG TERM CURRENT USE OF ANTICOAGULATION THERAPY    Pt on anticoagulation at home with daily warfarin. INR was 4.4 on admission. Has decreased PO intake for the past two months. INR 1.4 today. Will re-initiate warfarin.   - One dose this AM with additional dose in the afternoon  - Warfarin 5 mg every Sun, Mon, Wed, Fri  - Warfarin 7.5 mg every Tue, Thu, Sat  - Target INR of 2.5 (range 2.0 to 3.0)    There was some concern that patient had melena- however nurse reports that patient's stool was green and not black tarry stool. Hemoglobin was checked and was stable over two day period.     Discharged home with lovenox to bridge to warfarin            CAD (coronary atherosclerotic disease)    PERIPHERAL VASCULAR DISEASE    Takes high-intensity statin. Also has nitroglycerin 0.4 mg PRN. Denies every requiring nitro. Nil aspirin in the outpatient setting.   - Aspirin 81 mg (held after restarting warfarin/lovenox)  - Plavix 300 then 75 mg  - Atorvastatin 80 mg QD  - Isosorbide mononitrate 120 mg qd                Long term current use of anticoagulant therapy                Final Active Diagnoses:    Diagnosis Date Noted POA    PRINCIPAL PROBLEM:  Acute renal failure superimposed on stage 3 chronic kidney disease [N17.9, N18.3] 03/19/2018 Yes    NSTEMI (non-ST elevated myocardial infarction) [I21.4] 03/21/2018 Yes    Bleeding nose [R04.0] 03/21/2018 Yes    H/O mechanical aortic valve replacement [Z95.2] 09/17/2014 Not Applicable    Metabolic acidosis with normal anion gap and bicarbonate losses [E87.2] 03/20/2018 Yes    CAD  (coronary atherosclerotic disease) [I25.10] 09/11/2012 Yes    Gastroesophageal reflux disease without esophagitis [K21.9] 03/19/2018 Yes    Obesity, diabetes, and hypertension syndrome [E11.9, I10, E66.9] 02/23/2016 Yes    Hyperparathyroidism due to renal insufficiency [N25.81] 07/27/2015 Yes    CKD (chronic kidney disease), stage III [N18.3] 05/27/2015 Yes    Anemia of chronic renal failure, stage 3 (moderate) [N18.3, D63.1] 05/27/2015 Yes    Left ventricular diastolic dysfunction with preserved systolic function [I51.9] 05/27/2015 Yes    Type 2 diabetes mellitus with diabetic peripheral angiopathy without gangrene [E11.51] 05/27/2015 Yes    History of gout [Z87.39] 09/26/2012 Yes    Long term current use of anticoagulant therapy [Z79.01] 09/26/2012 Not Applicable    Hyperlipidemia [E78.5] 09/26/2012 Yes    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    Why:  8:00 am with Dr Miranda  Contact information:  [id number]  [facility] [address]  [phone]             José Miguel Vences MD In 4 weeks.    Specialties:  Cardiology, INTERVENTIONAL CARDIOLOGY  Contact information:  [address]  [facility] [address]  [phone]                 Patient Instructions:     Ambulatory referral to Cardiology   Referral Priority: Routine Referral Type: Consultation   Referral Reason: Specialty Services Required    Requested Specialty: Cardiology    Number of Visits Requested: 1      Diet Cardiac     Activity as tolerated     Notify your health care provider if you experience any of the following:   persistent dizziness, light-headedness, or visual disturbances         Significant Diagnostic Studies: Labs:   CMP   Recent Labs  Lab 03/27/18  0416 03/28/18  0453    139   K 4.8 5.2*    109   CO2 23 23   GLU 82 102   BUN 47* 39*   CREATININE 1.6* 1.6*   CALCIUM 9.5 9.4   ANIONGAP 9 7*   ESTGFRAFRICA 47.7* 47.7*   EGFRNONAA 41.2* 41.2*   , CBC   Recent Labs  Lab 03/28/18  1116   WBC 7.10   HGB 10.7*   HCT 32.7*       and Troponin   Recent Labs  Lab 03/21/18  2353   TROPONINI 4.887*       Pending Diagnostic Studies:     Procedure Component Value Units Date/Time    Occult blood x 1, stool [[id number]] Collected:  03/28/18 1230    Order Status:  Sent Lab Status:  In process Updated:  03/28/18 1230    Specimen:  Stool from Stool          Medications:  Reconciled Home Medications:      Medication List      START taking these medications    carvedilol 6.25 MG tablet  Commonly known as:  COREG  Take 2 tablets (12.5 mg total) by mouth 2 (two) times daily.     enoxaparin 120 mg/0.8 mL Syrg  Commonly known as:  LOVENOX  Inject 0.8 mLs (120 mg total) into the skin once daily.  Start taking on:  3/29/2018     metroNIDAZOLE 500 MG tablet  Commonly known as:  FLAGYL  Take 1 tablet (500 mg total) by mouth every 12 (twelve) hours.        CHANGE how you take these medications    NIFEdipine 30 MG (OSM) 24 hr tablet  Commonly known as:  PROCARDIA-XL  Take 2 tablets (60 mg total) by mouth once daily.  What changed:  how much to take        CONTINUE taking these medications    allopurinol 100 MG tablet  Commonly known as:  ZYLOPRIM  TAKE 1 TABLET ONE TIME DAILY     atorvastatin 80 MG tablet  Commonly known as:  LIPITOR  TAKE 1 TABLET ONE TIME DAILY     clotrimazole-betamethasone 1-0.05% cream  Commonly known as:  LOTRISONE  Apply topically once daily. in between the 4th and 5th toes left foot.     fenofibrate micronized 200 MG Cap  Commonly known as:  LOFIBRA     ferrous sulfate 325 (65 FE) MG EC tablet  Take 1 tablet  (325 mg total) by mouth once daily.     furosemide 20 MG tablet  Commonly known as:  LASIX  TAKE 1 TABLET ONE TIME DAILY     glimepiride 1 MG tablet  Commonly known as:  AMARYL  Take 1 tablet (1 mg total) by mouth every morning.     isosorbide mononitrate 120 MG 24 hr tablet  Commonly known as:  IMDUR  Take 1 tablet (120 mg total) by mouth once daily.     nitroGLYCERIN 0.4 MG SL tablet  Commonly known as:  NITROSTAT  Place 1 tablet (0.4 mg total) under the tongue every 5 (five) minutes as needed. As needed for chest pain     omega-3 acid ethyl esters 1 gram capsule  Commonly known as:  LOVAZA  Take 2 capsules (2 g total) by mouth 2 (two) times daily.     ranitidine 300 MG tablet  Commonly known as:  ZANTAC  Take 1 tablet (300 mg total) by mouth every evening.     warfarin 5 MG tablet  Commonly known as:  COUMADIN  Take 1 tablet (5 mg total) by mouth Daily. Current Dose (7.5 mg on Mon, Wed, Fri; 5 mg all other days)        STOP taking these medications    fosinopril 40 MG tablet  Commonly known as:  MONOPRIL     loperamide 2 mg capsule  Commonly known as:  IMODIUM     metoprolol tartrate 50 MG tablet  Commonly known as:  LOPRESSOR           Where to Get Your Medications      These medications were sent to [facility] [address] [address]    Phone:  [phone]   · carvedilol 6.25 MG tablet  · enoxaparin 120 mg/0.8 mL Syrg  · isosorbide mononitrate 120 MG 24 hr tablet  · metroNIDAZOLE 500 MG tablet  · NIFEdipine 30 MG (OSM) 24 hr tablet         Indwelling Lines/Drains at time of discharge:   Lines/Drains/Airways          No matching active lines, drains, or airways          Time spent on the discharge of patient: 30 minutes  Patient was seen and examined on the date of discharge and determined to be suitable for discharge.         Liz Mata DO  Department of Hospital Medicine  [facility]

## 2018-03-28 NOTE — PROGRESS NOTES
"The pt was recently admitted from 3/19 through 3/28 for diarrhea (6x/day x 3 days) and was found to have ARF and NSTEMI.  He is now s/p C on 3/27/2018.  See calendar for recent INRs and warfarin doses while admitted.  I was able to speak to his wife today and she confirmed that he was discharged with Lovenox 120mg Q24hrs, nitroglycerin, nifedipine, flagyl 500mg BID x 5 doses.  The pt is 76yo M with Height: 5'5" (varying entries in EPIC), Weight: 185lbs./84kg, Scr: 1.6 and CrCl: Greater than 30mL/min.  He will take the warfarin dosing as found in the calendar and will get an INR for us on Monday at the lab, as the clinic has no open appts.  "

## 2018-03-28 NOTE — PLAN OF CARE
03/28/18 1501   Final Note   Assessment Type Final Discharge Note   Discharge Disposition Home   Hospital Follow Up  Appt(s) scheduled? Yes   Discharge plans and expectations educations in teach back method with documentation complete? Yes

## 2018-03-28 NOTE — SUBJECTIVE & OBJECTIVE
Interval History: Patient underwent LHC yesterday which showed 75% stenosis of the left main corotnary artery, as well as diffuse disease of the left circumflex. No intervention was done. Patient feels well and has no complains. No chest pain.     Review of Systems   Constitutional: Negative for chills and fever.   Respiratory: Negative for cough and shortness of breath.    Cardiovascular: Negative for chest pain and palpitations.   Gastrointestinal: Negative for abdominal pain, diarrhea, nausea and vomiting.   Genitourinary: Negative for dysuria and hematuria.   Musculoskeletal: Negative for back pain.   Neurological: Negative for light-headedness and headaches.   Psychiatric/Behavioral: Negative for agitation and behavioral problems.     Objective:     Vital Signs (Most Recent):  Temp: 97.6 °F (36.4 °C) (03/28/18 1157)  Pulse: (!) 54 (03/28/18 1157)  Resp: 12 (03/28/18 0727)  BP: (!) 147/64 (03/28/18 1157)  SpO2: 95 % (03/28/18 1157) Vital Signs (24h Range):  Temp:  [97.5 °F (36.4 °C)-98.5 °F (36.9 °C)] 97.6 °F (36.4 °C)  Pulse:  [51-82] 54  Resp:  [12-18] 12  SpO2:  [93 %-98 %] 95 %  BP: (140-164)/(62-71) 147/64     Weight: 84.1 kg (185 lb 6.5 oz)  Body mass index is 30.85 kg/m².    Intake/Output Summary (Last 24 hours) at 03/28/18 1608  Last data filed at 03/27/18 1800   Gross per 24 hour   Intake           549.99 ml   Output             1200 ml   Net          -650.01 ml      Physical Exam   Constitutional: He is oriented to person, place, and time. He appears well-developed and well-nourished. No distress.   HENT:   Head: Normocephalic and atraumatic.   Mouth/Throat: Oropharynx is clear and moist.   Eyes: Conjunctivae and EOM are normal.   Neck: Normal range of motion. Neck supple.   Cardiovascular: Normal rate, regular rhythm and normal heart sounds.  Exam reveals no friction rub.    No murmur heard.  Pulmonary/Chest: Effort normal and breath sounds normal. No respiratory distress. He has no wheezes. He has no  rales.   Abdominal: Soft. Bowel sounds are normal. He exhibits no distension. There is no tenderness. There is no guarding.   Musculoskeletal: Normal range of motion. He exhibits no edema.   Neurological: He is alert and oriented to person, place, and time.   Skin: Skin is warm and dry. No rash noted. He is not diaphoretic.   Psychiatric: He has a normal mood and affect. His behavior is normal.       Significant Labs:   CBC:   Recent Labs  Lab 03/28/18  1116   WBC 7.10   HGB 10.7*   HCT 32.7*        CMP:   Recent Labs  Lab 03/27/18  0416 03/28/18  0453    139   K 4.8 5.2*    109   CO2 23 23   GLU 82 102   BUN 47* 39*   CREATININE 1.6* 1.6*   CALCIUM 9.5 9.4   ANIONGAP 9 7*   EGFRNONAA 41.2* 41.2*       Significant Imaging: I have reviewed all pertinent imaging results/findings within the past 24 hours.

## 2018-03-28 NOTE — ASSESSMENT & PLAN NOTE
PERIPHERAL VASCULAR DISEASE    Takes high-intensity statin. Also has nitroglycerin 0.4 mg PRN. Denies every requiring nitro. Nil aspirin in the outpatient setting.   - Aspirin 81 mg (held after restarting warfarin/lovenox)  - Plavix 300 then 75 mg  - Atorvastatin 80 mg QD  - Isosorbide mononitrate 120 mg qd

## 2018-03-29 LAB — OB PNL STL: NEGATIVE

## 2018-03-30 ENCOUNTER — PATIENT OUTREACH (OUTPATIENT)
Dept: ADMINISTRATIVE | Facility: CLINIC | Age: 78
End: 2018-03-30

## 2018-03-30 NOTE — PATIENT INSTRUCTIONS
Discharge Instructions for Heart Attack  You have had a heart attack (acute myocardial infarction). A heart attack occurs when a vessel that sends blood to your heart suddenly becomes blocked. This causes your heart not to work as well as it should. Follow these guidelines for home care and lifestyle changes.  Home care  · Take your medicines exactly as directed. Dont skip doses. Talk with your healthcare provider if your medicines aren't working for you. Together you can come up with another treatment plan.  · Remember that recovery after a heart attack takes time. Plan to rest for at least 4 to 8 weeks while you recover. Then return to normal activity when your doctor says its OK.  · Ask your doctor about joining a heart rehabilitation program. This can help strengthen your heart and lungs and give you more energy and confidence.  · Tell your doctor if you are feeling depressed. Feelings of sadness are common after a heart attack. But it is important to speak to someone or seek counseling if you are feeling overwhelmed by these feelings.  · Call 911 right away if you have chest pain or pain that goes to your shoulder, neck, or back. Don't drive yourself to the hospital.  · Ask your family members to learn CPR. This is an important skill that can save lives when it's needed.  · Learn to take your own blood pressure and pulse. Keep a record of your results. Ask your doctor when you should seek emergency medical attention. He or she will tell you which blood pressure reading is dangerous.  Lifestyle changes  Your heart attack might have been caused by cardiovascular disease. Your healthcare provider will work with you to make changes to your lifestyle. This will help the heart disease from getting worse. These changes will most likely be a combination of diet and exercise.  Diet  Your healthcare provider will tell you what changes you need to make to your diet. You may need to see a registered dietitian for help  with these diet changes. These changes may include:  · Cutting back on how much fat and cholesterol you eat  · Cutting back on how much salt (sodium) you eat, especially if you have high blood pressure  · Eating more fresh vegetables and fruits  · Eating lean proteins such as fish, poultry, beans, and peas, and eating less red meat and processed meats  · Using low-fat dairy products  · Using vegetable and nut oils in limited amounts  · Limiting how many sweets and processed foods such as chips, cookies, and baked goods you eat  · Limiting how often you eat out. And when you do eat out, making better food choices.  · Not eating fried or greasy foods, or foods high in saturated fat  Exercise  Your healthcare provider may tell you to get more exercise if you haven't been physically active. Depending on your case, your provider may recommend that you get moderate to vigorous physical activity for at least 40 minutes each day, and for at least 3 to 4 days each week. A few examples of moderate to vigorous activity include:  · Walking at a brisk pace, about 3 to 4 miles per hour  · Jogging or running  · Swimming or water aerobics  · Hiking  · Dancing  · Martial arts  · Tennis  · Riding a bicycle or stationary bike  Other changes  Your healthcare provider may also recommend that you:  · Lose weight. If you are overweight or obese, your provider will work with you to lose extra pounds. Making diet changes and getting more exercise can help. A good goal is to lose your 10% of your body weight in one year.  · Stop smoking. Sign up for a stop-smoking program to make it more likely for you to quit for good. You can join a stop-smoking support group. Or ask your doctor about nicotine replacement products.  · Learn to manage stress. Stress management techniques to help you deal with stress in your home and work life. This will help you feel better emotionally and ease the strain on your heart.  Follow-up  Make a follow-up  appointment as directed by our staff.     When to seek medical advice  Call 911 right away if you have:  · Chest pain that goes to your neck, jaw, back, or shoulder  · Shortness of breath  Otherwise, call your doctor immediately if you have:  · Lightheadedness, dizziness, or fainting  · Feeling of irregular heartbeat or fast pulse.   Date Last Reviewed: 10/1/2016  © 7961-4900 [employer]. [address] [place] [address], [place] [address]. All rights reserved. This information is not intended as a substitute for professional medical care. Always follow your healthcare professional's instructions.

## 2018-04-01 ENCOUNTER — NURSE TRIAGE (OUTPATIENT)
Dept: ADMINISTRATIVE | Facility: CLINIC | Age: 78
End: 2018-04-01

## 2018-04-01 ENCOUNTER — HOSPITAL ENCOUNTER (OUTPATIENT)
Facility: HOSPITAL | Age: 78
Discharge: HOME | End: 2018-04-04
Attending: EMERGENCY MEDICINE | Admitting: HOSPITALIST
Payer: MEDICARE

## 2018-04-01 DIAGNOSIS — K92.2 GASTROINTESTINAL HEMORRHAGE, UNSPECIFIED GASTROINTESTINAL HEMORRHAGE TYPE: Primary | ICD-10-CM

## 2018-04-01 DIAGNOSIS — Z95.2 H/O MECHANICAL AORTIC VALVE REPLACEMENT: ICD-10-CM

## 2018-04-01 DIAGNOSIS — Z79.01 LONG TERM CURRENT USE OF ANTICOAGULANT THERAPY: ICD-10-CM

## 2018-04-01 DIAGNOSIS — E11.9 DM (DIABETES MELLITUS): ICD-10-CM

## 2018-04-01 LAB
ABO + RH BLD: NORMAL
ALBUMIN SERPL BCP-MCNC: 3.6 G/DL
ALP SERPL-CCNC: 69 U/L
ALT SERPL W/O P-5'-P-CCNC: 55 U/L
ANION GAP SERPL CALC-SCNC: 7 MMOL/L
APTT BLDCRRT: 36.4 SEC
AST SERPL-CCNC: 62 U/L
BACTERIA #/AREA URNS AUTO: NORMAL /HPF
BASOPHILS # BLD AUTO: 0.04 K/UL
BASOPHILS # BLD AUTO: 0.04 K/UL
BASOPHILS NFR BLD: 0.7 %
BASOPHILS NFR BLD: 0.7 %
BILIRUB SERPL-MCNC: 0.3 MG/DL
BILIRUB UR QL STRIP: NEGATIVE
BLD GP AB SCN CELLS X3 SERPL QL: NORMAL
BUN SERPL-MCNC: 50 MG/DL
CALCIUM SERPL-MCNC: 9.5 MG/DL
CHLORIDE SERPL-SCNC: 110 MMOL/L
CLARITY UR REFRACT.AUTO: CLEAR
CO2 SERPL-SCNC: 22 MMOL/L
COLOR UR AUTO: YELLOW
CREAT SERPL-MCNC: 1.9 MG/DL
DIFFERENTIAL METHOD: ABNORMAL
DIFFERENTIAL METHOD: ABNORMAL
EOSINOPHIL # BLD AUTO: 0.1 K/UL
EOSINOPHIL # BLD AUTO: 0.1 K/UL
EOSINOPHIL NFR BLD: 2.2 %
EOSINOPHIL NFR BLD: 2.2 %
ERYTHROCYTE [DISTWIDTH] IN BLOOD BY AUTOMATED COUNT: 13.7 %
ERYTHROCYTE [DISTWIDTH] IN BLOOD BY AUTOMATED COUNT: 13.8 %
EST. GFR  (AFRICAN AMERICAN): 38.7 ML/MIN/1.73 M^2
EST. GFR  (NON AFRICAN AMERICAN): 33.5 ML/MIN/1.73 M^2
GLUCOSE SERPL-MCNC: 103 MG/DL
GLUCOSE UR QL STRIP: NEGATIVE
HCT VFR BLD AUTO: 30.9 %
HCT VFR BLD AUTO: 31 %
HGB BLD-MCNC: 10.2 G/DL
HGB BLD-MCNC: 10.2 G/DL
HGB UR QL STRIP: NEGATIVE
HYALINE CASTS UR QL AUTO: 0 /LPF
IMM GRANULOCYTES # BLD AUTO: 0.01 K/UL
IMM GRANULOCYTES # BLD AUTO: 0.02 K/UL
IMM GRANULOCYTES NFR BLD AUTO: 0.2 %
IMM GRANULOCYTES NFR BLD AUTO: 0.3 %
INR PPP: 1.6
KETONES UR QL STRIP: NEGATIVE
LACTATE SERPL-SCNC: 0.6 MMOL/L
LEUKOCYTE ESTERASE UR QL STRIP: ABNORMAL
LIPASE SERPL-CCNC: 82 U/L
LYMPHOCYTES # BLD AUTO: 1.9 K/UL
LYMPHOCYTES # BLD AUTO: 2.3 K/UL
LYMPHOCYTES NFR BLD: 35.3 %
LYMPHOCYTES NFR BLD: 39.6 %
MAGNESIUM SERPL-MCNC: 2 MG/DL
MCH RBC QN AUTO: 30.7 PG
MCH RBC QN AUTO: 30.9 PG
MCHC RBC AUTO-ENTMCNC: 32.9 G/DL
MCHC RBC AUTO-ENTMCNC: 33 G/DL
MCV RBC AUTO: 93 FL
MCV RBC AUTO: 94 FL
MICROSCOPIC COMMENT: NORMAL
MONOCYTES # BLD AUTO: 0.6 K/UL
MONOCYTES # BLD AUTO: 0.6 K/UL
MONOCYTES NFR BLD: 10.8 %
MONOCYTES NFR BLD: 11 %
NEUTROPHILS # BLD AUTO: 2.7 K/UL
NEUTROPHILS # BLD AUTO: 2.8 K/UL
NEUTROPHILS NFR BLD: 46.4 %
NEUTROPHILS NFR BLD: 50.6 %
NITRITE UR QL STRIP: NEGATIVE
NRBC BLD-RTO: 0 /100 WBC
NRBC BLD-RTO: 0 /100 WBC
PH UR STRIP: 5 [PH] (ref 5–8)
PLATELET # BLD AUTO: 179 K/UL
PLATELET # BLD AUTO: 190 K/UL
PMV BLD AUTO: 10.9 FL
PMV BLD AUTO: 10.9 FL
POTASSIUM SERPL-SCNC: 5 MMOL/L
PROT SERPL-MCNC: 7.6 G/DL
PROT UR QL STRIP: ABNORMAL
PROTHROMBIN TIME: 16.1 SEC
RBC # BLD AUTO: 3.3 M/UL
RBC # BLD AUTO: 3.32 M/UL
RBC #/AREA URNS AUTO: 1 /HPF (ref 0–4)
SODIUM SERPL-SCNC: 139 MMOL/L
SP GR UR STRIP: 1.01 (ref 1–1.03)
SQUAMOUS #/AREA URNS AUTO: 1 /HPF
URN SPEC COLLECT METH UR: ABNORMAL
UROBILINOGEN UR STRIP-ACNC: NEGATIVE EU/DL
WBC # BLD AUTO: 5.47 K/UL
WBC # BLD AUTO: 5.86 K/UL
WBC #/AREA URNS AUTO: 1 /HPF (ref 0–5)

## 2018-04-01 PROCEDURE — 99284 EMERGENCY DEPT VISIT MOD MDM: CPT | Mod: 25

## 2018-04-01 PROCEDURE — 94761 N-INVAS EAR/PLS OXIMETRY MLT: CPT

## 2018-04-01 PROCEDURE — 83735 ASSAY OF MAGNESIUM: CPT

## 2018-04-01 PROCEDURE — 63600175 PHARM REV CODE 636 W HCPCS: Performed by: HOSPITALIST

## 2018-04-01 PROCEDURE — 93010 ELECTROCARDIOGRAM REPORT: CPT | Mod: ,,, | Performed by: INTERNAL MEDICINE

## 2018-04-01 PROCEDURE — 96374 THER/PROPH/DIAG INJ IV PUSH: CPT

## 2018-04-01 PROCEDURE — 83036 HEMOGLOBIN GLYCOSYLATED A1C: CPT

## 2018-04-01 PROCEDURE — 86850 RBC ANTIBODY SCREEN: CPT

## 2018-04-01 PROCEDURE — 85520 HEPARIN ASSAY: CPT

## 2018-04-01 PROCEDURE — 96361 HYDRATE IV INFUSION ADD-ON: CPT

## 2018-04-01 PROCEDURE — 36415 COLL VENOUS BLD VENIPUNCTURE: CPT

## 2018-04-01 PROCEDURE — 83605 ASSAY OF LACTIC ACID: CPT

## 2018-04-01 PROCEDURE — 99283 EMERGENCY DEPT VISIT LOW MDM: CPT | Mod: ,,, | Performed by: EMERGENCY MEDICINE

## 2018-04-01 PROCEDURE — 85730 THROMBOPLASTIN TIME PARTIAL: CPT

## 2018-04-01 PROCEDURE — 80053 COMPREHEN METABOLIC PANEL: CPT

## 2018-04-01 PROCEDURE — 25000003 PHARM REV CODE 250: Performed by: PHYSICIAN ASSISTANT

## 2018-04-01 PROCEDURE — 99220 PR INITIAL OBSERVATION CARE,LEVL III: CPT | Mod: GC,,, | Performed by: HOSPITALIST

## 2018-04-01 PROCEDURE — 85610 PROTHROMBIN TIME: CPT

## 2018-04-01 PROCEDURE — 81001 URINALYSIS AUTO W/SCOPE: CPT

## 2018-04-01 PROCEDURE — G0378 HOSPITAL OBSERVATION PER HR: HCPCS

## 2018-04-01 PROCEDURE — 25000003 PHARM REV CODE 250: Performed by: STUDENT IN AN ORGANIZED HEALTH CARE EDUCATION/TRAINING PROGRAM

## 2018-04-01 PROCEDURE — 85025 COMPLETE CBC W/AUTO DIFF WBC: CPT

## 2018-04-01 PROCEDURE — 83690 ASSAY OF LIPASE: CPT

## 2018-04-01 PROCEDURE — 25000003 PHARM REV CODE 250: Performed by: HOSPITALIST

## 2018-04-01 PROCEDURE — C9113 INJ PANTOPRAZOLE SODIUM, VIA: HCPCS | Performed by: HOSPITALIST

## 2018-04-01 RX ORDER — GLUCAGON 1 MG
1 KIT INJECTION
Status: DISCONTINUED | OUTPATIENT
Start: 2018-04-01 | End: 2018-04-04 | Stop reason: HOSPADM

## 2018-04-01 RX ORDER — FENOFIBRATE 200 MG/1
200 CAPSULE ORAL
Status: DISCONTINUED | OUTPATIENT
Start: 2018-04-02 | End: 2018-04-04 | Stop reason: HOSPADM

## 2018-04-01 RX ORDER — PANTOPRAZOLE SODIUM 40 MG/1
40 TABLET, DELAYED RELEASE ORAL
Status: DISCONTINUED | OUTPATIENT
Start: 2018-04-02 | End: 2018-04-04 | Stop reason: HOSPADM

## 2018-04-01 RX ORDER — ALLOPURINOL 100 MG/1
100 TABLET ORAL DAILY
Status: DISCONTINUED | OUTPATIENT
Start: 2018-04-02 | End: 2018-04-04 | Stop reason: HOSPADM

## 2018-04-01 RX ORDER — ISOSORBIDE MONONITRATE 120 MG/1
120 TABLET, EXTENDED RELEASE ORAL DAILY
Status: DISCONTINUED | OUTPATIENT
Start: 2018-04-02 | End: 2018-04-04 | Stop reason: HOSPADM

## 2018-04-01 RX ORDER — SODIUM CHLORIDE 0.9 % (FLUSH) 0.9 %
5 SYRINGE (ML) INJECTION
Status: DISCONTINUED | OUTPATIENT
Start: 2018-04-01 | End: 2018-04-04 | Stop reason: HOSPADM

## 2018-04-01 RX ORDER — IBUPROFEN 200 MG
16 TABLET ORAL
Status: DISCONTINUED | OUTPATIENT
Start: 2018-04-01 | End: 2018-04-04 | Stop reason: HOSPADM

## 2018-04-01 RX ORDER — PANTOPRAZOLE SODIUM 40 MG/1
40 TABLET, DELAYED RELEASE ORAL DAILY
Status: DISCONTINUED | OUTPATIENT
Start: 2018-04-02 | End: 2018-04-01

## 2018-04-01 RX ORDER — CARVEDILOL 12.5 MG/1
12.5 TABLET ORAL 2 TIMES DAILY
Status: DISCONTINUED | OUTPATIENT
Start: 2018-04-01 | End: 2018-04-04 | Stop reason: HOSPADM

## 2018-04-01 RX ORDER — FUROSEMIDE 40 MG/1
40 TABLET ORAL DAILY
Status: DISCONTINUED | OUTPATIENT
Start: 2018-04-02 | End: 2018-04-01

## 2018-04-01 RX ORDER — ATORVASTATIN CALCIUM 20 MG/1
80 TABLET, FILM COATED ORAL DAILY
Status: DISCONTINUED | OUTPATIENT
Start: 2018-04-02 | End: 2018-04-04 | Stop reason: HOSPADM

## 2018-04-01 RX ORDER — HEPARIN SODIUM,PORCINE/D5W 25000/250
18 INTRAVENOUS SOLUTION INTRAVENOUS CONTINUOUS
Status: DISCONTINUED | OUTPATIENT
Start: 2018-04-01 | End: 2018-04-02

## 2018-04-01 RX ORDER — CLOTRIMAZOLE AND BETAMETHASONE DIPROPIONATE 10; .64 MG/G; MG/G
CREAM TOPICAL DAILY
Status: DISCONTINUED | OUTPATIENT
Start: 2018-04-02 | End: 2018-04-04 | Stop reason: HOSPADM

## 2018-04-01 RX ORDER — NIFEDIPINE 60 MG/1
60 TABLET, EXTENDED RELEASE ORAL DAILY
Status: DISCONTINUED | OUTPATIENT
Start: 2018-04-02 | End: 2018-04-03

## 2018-04-01 RX ORDER — OMEGA-3-ACID ETHYL ESTERS 1 G/1
2 CAPSULE, LIQUID FILLED ORAL 2 TIMES DAILY
Status: DISCONTINUED | OUTPATIENT
Start: 2018-04-01 | End: 2018-04-04 | Stop reason: HOSPADM

## 2018-04-01 RX ORDER — IBUPROFEN 200 MG
24 TABLET ORAL
Status: DISCONTINUED | OUTPATIENT
Start: 2018-04-01 | End: 2018-04-04 | Stop reason: HOSPADM

## 2018-04-01 RX ORDER — PANTOPRAZOLE SODIUM 40 MG/10ML
80 INJECTION, POWDER, LYOPHILIZED, FOR SOLUTION INTRAVENOUS ONCE
Status: DISCONTINUED | OUTPATIENT
Start: 2018-04-01 | End: 2018-04-01

## 2018-04-01 RX ADMIN — SODIUM CHLORIDE, POTASSIUM CHLORIDE, SODIUM LACTATE AND CALCIUM CHLORIDE 500 ML: 600; 310; 30; 20 INJECTION, SOLUTION INTRAVENOUS at 06:04

## 2018-04-01 RX ADMIN — SODIUM CHLORIDE 500 ML: 9 INJECTION, SOLUTION INTRAVENOUS at 06:04

## 2018-04-01 RX ADMIN — CARVEDILOL 12.5 MG: 12.5 TABLET, FILM COATED ORAL at 11:04

## 2018-04-01 RX ADMIN — OMEGA-3-ACID ETHYL ESTERS 2 G: 1 CAPSULE, LIQUID FILLED ORAL at 11:04

## 2018-04-01 RX ADMIN — DEXTROSE 80 MG: 50 INJECTION, SOLUTION INTRAVENOUS at 08:04

## 2018-04-01 NOTE — ED TRIAGE NOTES
Patient identifiers for Dariel Urbina 77 y.o. male checked and correct. Patient presents to the ED complaining of bright red blood in stools which happened twice today. Patient is currently on Lovenox and Warfarin. He also reports of diarrhea but denies abdominal pain, N/V. Per wife's report, patient was just discharged from hospital on Wednesday for dehydration.      Patient lying in stretcher, appears to be resting comfortably and in no acute distress. Patient is clean and well groomed and clothing is properly fastened.    NEUROLOGICAL: The patient is awake, alert and oriented to person, time, place, and situation and speaking clearly and appropriately. Eyes open spontaneously, behavior appropriate to situation, follows commands, facial expression symmetrical, purposeful motor response noted, normal sensation in all extremities when touched with a finger.  CARDIOVASCULAR: Patient has an irregular rhythm. Pulses intact. No peripheral edema noted, capillary refill < 3 seconds.   RESPIRATORY: Airway is open, respirations are spontaneous, patient has a normal effort and rate, no accessory muscle use noted.   GASTROINTESTINAL: Abdomen is obese, soft and non-tender to palpation, no distention noted, active bowel sounds present in all four quadrants.   GENITOURINARY: Patient voids spontaneously without difficulty. Patient is continent.   MUSCULOSKELETAL: Patient moving all extremities spontaneously, no obvious swelling or deformities noted. Generalized weakness reported.  INTEGUMENTARY: The skin is warm and dry, color consistent with ethnicity, patient has normal skin turgor and moist mucus membranes. Bruises to left and right lower abdomen from Lovenox injections.    PSYCHOSOCIAL: Calm, pleasant, and cooperative. Spouse present at bedside.

## 2018-04-01 NOTE — TELEPHONE ENCOUNTER
Reason for Disposition   [1] MODERATE rectal bleeding (small blood clots, passing blood without stool, or toilet water turns red) AND [2] more than once a day    Protocols used: ST RECTAL BLEEDING-A-AH    Patient taking coumadin and lovenox. Wife called and is concerned that he passed a lot of blood in his stool 2 times today. He is feeling well otherwise. Advised her to bring the patient to the ED for evaluation. She verbalized understanding.

## 2018-04-01 NOTE — ED PROVIDER NOTES
Encounter Date: 4/1/2018       History     Chief Complaint   Patient presents with    Rectal Bleeding     bleeding began tjhis am . Just dc'd from hospital  and is on blood thinners. Onset bleeding 2 hrs ago. Deneis SOB or chest manjeet      Patient is a 77-year-old male with a history of hypertension, mechanical heart valve, the ED, diabetes currently on Lovenox and Coumadin is presenting to the ER for evaluation of rectal bleeding.  Patient states that he was recently admitted to the hospital and discharged on the 29th for diarrhea and dehydration.  He states that he went into renal failure and had a non-STEMI at the time.  He states that since his discharge she continued to have diarrhea which was initially non-mucousy or bloody.  He states that today he had 2 episodes of bloody diarrhea.  It was red in color.  No melena.  Wife states that she took a look at it and was concerned, felt it was a large amount.  He denies any abdominal pain associated with this.  No nausea vomiting.  Denies any lightheadedness or dizziness.  No fever or chills.  No recent travel or changes in diet.  No sick contact. He does have a history of abdominal surgeries including a colon resection secondary to diverticulitis many years ago.  He denies any chest pain or palpitations at this time.  No shortness of breath.      The history is provided by the patient and the spouse.     Review of patient's allergies indicates:   Allergen Reactions    Fosinopril      Intolerance- elevates potassium level      Losartan      Intolerance- elevates potassium level     Past Medical History:   Diagnosis Date    Angina, class III 9/17/2014    Bilateral carotid artery disease 2/9/2017    CAD (coronary atherosclerotic disease) 9/11/2012    Cataract     Chronic kidney disease     Claudication of left lower extremity 9/17/2014    DM (diabetes mellitus) 9/26/2012    History of gout 9/26/2012    Hyperlipidemia     Hypertension     Mechanical heart  valve present     NSTEMI (non-ST elevated myocardial infarction) 3/21/2018     Past Surgical History:   Procedure Laterality Date    CARDIAC CATHETERIZATION      CARDIAC VALVE REPLACEMENT      CARDIAC VALVE SURGERY      COLON SURGERY      COLONOSCOPY N/A 3/31/2017    Procedure: COLONOSCOPY;  Surgeon: Bruno Raymond MD;  Location: [facility] ([address] [place] [address]);  Service: Endoscopy;  Laterality: N/A;  Patient's wife requesting date.    CORONARY ANGIOPLASTY      CORONARY ARTERY BYPASS GRAFT      HERNIA REPAIR      SPINE SURGERY      VASECTOMY       Family History   Problem Relation Age of Onset    Heart failure Mother     Heart disease Mother     Heart failure Father     Heart disease Father     Alcohol abuse Father     Heart failure Brother     Heart disease Brother     Diabetes Brother     No Known Problems Sister     No Known Problems Maternal Grandmother     No Known Problems Maternal Grandfather     No Known Problems Paternal Grandmother     No Known Problems Paternal Grandfather     Heart disease Sister     No Known Problems Maternal Aunt     No Known Problems Maternal Uncle     No Known Problems Paternal Aunt     No Known Problems Paternal Uncle     Amblyopia Neg Hx     Blindness Neg Hx     Cancer Neg Hx     Cataracts Neg Hx     Glaucoma Neg Hx     Hypertension Neg Hx     Macular degeneration Neg Hx     Retinal detachment Neg Hx     Strabismus Neg Hx     Stroke Neg Hx     Thyroid disease Neg Hx     Anemia Neg Hx     Arrhythmia Neg Hx     Asthma Neg Hx     Clotting disorder Neg Hx     Fainting Neg Hx     Heart attack Neg Hx     Hyperlipidemia Neg Hx     Atrial Septal Defect Neg Hx     Melanoma Neg Hx      Social History   Substance Use Topics    Smoking status: Former Smoker     Packs/day: 1.00     Years: 20.00     Quit date: 9/11/1980    Smokeless tobacco: Never Used    Alcohol use No     Review of Systems   Constitutional: Negative for chills and fever.   HENT:  Negative for congestion.    Respiratory: Negative for cough and shortness of breath.    Cardiovascular: Negative for chest pain.   Gastrointestinal: Positive for blood in stool and diarrhea. Negative for abdominal pain, nausea, rectal pain and vomiting.   Genitourinary: Negative for dysuria and flank pain.   Musculoskeletal: Negative for back pain.   Skin: Negative for rash.   Allergic/Immunologic: Negative for immunocompromised state.   Neurological: Negative for dizziness and weakness.   Hematological: Bruises/bleeds easily.   Psychiatric/Behavioral: Negative for confusion.       Physical Exam     Initial Vitals [04/01/18 1717]   BP Pulse Resp Temp SpO2   (!) 203/84 (!) 55 -- 98.3 °F (36.8 °C) 97 %      MAP       123.67         Physical Exam    Constitutional: He appears well-developed and well-nourished. He is not diaphoretic. No distress.   HENT:   Head: Normocephalic and atraumatic.   Mouth/Throat: Mucous membranes are dry.   Eyes: Conjunctivae and EOM are normal. Pupils are equal, round, and reactive to light.   Cardiovascular: Normal rate and intact distal pulses.   Murmur heard.  Pulmonary/Chest: Breath sounds normal. No respiratory distress. He has no wheezes.   Abdominal: Soft. He exhibits no distension. There is no tenderness. There is no rebound and no guarding.   Genitourinary: Rectal exam shows guaiac positive stool. Guaiac positive stool. : Acceptable.  Neurological: He is alert and oriented to person, place, and time.   Skin: Skin is warm and dry.         ED Course   Procedures  Labs Reviewed   CBC W/ AUTO DIFFERENTIAL - Abnormal; Notable for the following:        Result Value    RBC 3.30 (*)     Hemoglobin 10.2 (*)     Hematocrit 30.9 (*)     All other components within normal limits   URINALYSIS, REFLEX TO URINE CULTURE - Abnormal; Notable for the following:     Protein, UA 1+ (*)     Leukocytes, UA Trace (*)     All other components within normal limits    Narrative:     Preferred  Collection Type->Urine, Clean Catch   URINALYSIS MICROSCOPIC    Narrative:     Preferred Collection Type->Urine, Clean Catch   COMPREHENSIVE METABOLIC PANEL   LACTIC ACID, PLASMA   LIPASE   PROTIME-INR   APTT   TYPE & SCREEN                   APC / Resident Notes:   Patient was seen in the ER promptly upon arrival.  He is afebrile, no acute distress.  Physical examination does not reveal any tenderness with palpation of the abdomen.  The abdomen is otherwise soft, nondistended.  Rectal examination was done.  Red blood is noted with stool.  Hemoccult positive.  IV access established labs ordered.  Patient gently hydrated with LR    Laboratory studies show normal white count of 5.4.  Hemoglobin stable at 10.2.  Normal lactic acid.  Kidney functions to baseline.  Lipase minimally elevated.  Urinalysis unremarkable.    Upon reassessment patient is resting comfortably.  Do feel given that given the continued episodes of diarrhea and worsening with multiple bloody stool patient will require observation.  Discussed with hospitalist for admission.    The care of this patient was overseen by attending physician who agrees with treatment, plan, and disposition.           Attending Attestation:     Physician Attestation Statement for NP/PA:   I discussed this assessment and plan of this patient with the NP/PA, but I did not personally examine the patient. The face to face encounter was performed by the NP/PA.                     Clinical Impression:   The encounter diagnosis was Gastrointestinal hemorrhage, unspecified gastrointestinal hemorrhage type.                           Kye Lafleur MD  04/04/18 2035

## 2018-04-02 ENCOUNTER — ANESTHESIA EVENT (OUTPATIENT)
Dept: ENDOSCOPY | Facility: HOSPITAL | Age: 78
End: 2018-04-02
Payer: MEDICARE

## 2018-04-02 PROBLEM — N17.9 ACUTE RENAL FAILURE SUPERIMPOSED ON STAGE 3 CHRONIC KIDNEY DISEASE: Status: RESOLVED | Noted: 2018-03-19 | Resolved: 2018-04-02

## 2018-04-02 PROBLEM — N18.30 ACUTE RENAL FAILURE SUPERIMPOSED ON STAGE 3 CHRONIC KIDNEY DISEASE: Status: RESOLVED | Noted: 2018-03-19 | Resolved: 2018-04-02

## 2018-04-02 LAB
ANION GAP SERPL CALC-SCNC: 7 MMOL/L
APTT BLDCRRT: 85.2 SEC
BASOPHILS # BLD AUTO: 0.02 K/UL
BASOPHILS # BLD AUTO: 0.03 K/UL
BASOPHILS # BLD AUTO: 0.04 K/UL
BASOPHILS NFR BLD: 0.3 %
BASOPHILS NFR BLD: 0.5 %
BASOPHILS NFR BLD: 0.7 %
BUN SERPL-MCNC: 42 MG/DL
CALCIUM SERPL-MCNC: 9.8 MG/DL
CHLORIDE SERPL-SCNC: 111 MMOL/L
CO2 SERPL-SCNC: 22 MMOL/L
CREAT SERPL-MCNC: 1.6 MG/DL
CREAT UR-MCNC: 102 MG/DL
DIFFERENTIAL METHOD: ABNORMAL
EOSINOPHIL # BLD AUTO: 0.1 K/UL
EOSINOPHIL NFR BLD: 1.5 %
EOSINOPHIL NFR BLD: 1.7 %
EOSINOPHIL NFR BLD: 2.6 %
ERYTHROCYTE [DISTWIDTH] IN BLOOD BY AUTOMATED COUNT: 13.9 %
ERYTHROCYTE [DISTWIDTH] IN BLOOD BY AUTOMATED COUNT: 14 %
ERYTHROCYTE [DISTWIDTH] IN BLOOD BY AUTOMATED COUNT: 14.1 %
EST. GFR  (AFRICAN AMERICAN): 47.7 ML/MIN/1.73 M^2
EST. GFR  (NON AFRICAN AMERICAN): 41.2 ML/MIN/1.73 M^2
ESTIMATED AVG GLUCOSE: 108 MG/DL
FACT X PPP CHRO-ACNC: >1.86 IU/ML
FERRITIN SERPL-MCNC: 267 NG/ML
FOLATE SERPL-MCNC: 5.9 NG/ML
GLUCOSE SERPL-MCNC: 61 MG/DL
HBA1C MFR BLD HPLC: 5.4 %
HCT VFR BLD AUTO: 30.7 %
HCT VFR BLD AUTO: 31.2 %
HCT VFR BLD AUTO: 32.2 %
HGB BLD-MCNC: 10.2 G/DL
HGB BLD-MCNC: 10.2 G/DL
HGB BLD-MCNC: 10.4 G/DL
IMM GRANULOCYTES # BLD AUTO: 0.01 K/UL
IMM GRANULOCYTES # BLD AUTO: 0.01 K/UL
IMM GRANULOCYTES # BLD AUTO: 0.02 K/UL
IMM GRANULOCYTES NFR BLD AUTO: 0.2 %
IMM GRANULOCYTES NFR BLD AUTO: 0.2 %
IMM GRANULOCYTES NFR BLD AUTO: 0.3 %
INR PPP: 1.7
IRON SERPL-MCNC: 83 UG/DL
LYMPHOCYTES # BLD AUTO: 1.8 K/UL
LYMPHOCYTES # BLD AUTO: 2 K/UL
LYMPHOCYTES # BLD AUTO: 2.1 K/UL
LYMPHOCYTES NFR BLD: 30.7 %
LYMPHOCYTES NFR BLD: 33.2 %
LYMPHOCYTES NFR BLD: 39.5 %
MCH RBC QN AUTO: 29.6 PG
MCH RBC QN AUTO: 30 PG
MCH RBC QN AUTO: 30.6 PG
MCHC RBC AUTO-ENTMCNC: 32.3 G/DL
MCHC RBC AUTO-ENTMCNC: 32.7 G/DL
MCHC RBC AUTO-ENTMCNC: 33.2 G/DL
MCV RBC AUTO: 90 FL
MCV RBC AUTO: 92 FL
MCV RBC AUTO: 94 FL
MONOCYTES # BLD AUTO: 0.5 K/UL
MONOCYTES # BLD AUTO: 0.6 K/UL
MONOCYTES # BLD AUTO: 0.6 K/UL
MONOCYTES NFR BLD: 10.1 %
MONOCYTES NFR BLD: 8.4 %
MONOCYTES NFR BLD: 9.3 %
NEUTROPHILS # BLD AUTO: 2.5 K/UL
NEUTROPHILS # BLD AUTO: 3.4 K/UL
NEUTROPHILS # BLD AUTO: 3.5 K/UL
NEUTROPHILS NFR BLD: 46.9 %
NEUTROPHILS NFR BLD: 55.3 %
NEUTROPHILS NFR BLD: 58.6 %
NRBC BLD-RTO: 0 /100 WBC
PLATELET # BLD AUTO: 175 K/UL
PLATELET # BLD AUTO: 176 K/UL
PLATELET # BLD AUTO: 187 K/UL
PMV BLD AUTO: 10.8 FL
PMV BLD AUTO: 11.1 FL
PMV BLD AUTO: 11.4 FL
POCT GLUCOSE: 114 MG/DL (ref 70–110)
POCT GLUCOSE: 80 MG/DL (ref 70–110)
POTASSIUM SERPL-SCNC: 5.4 MMOL/L
PROTHROMBIN TIME: 16.7 SEC
RBC # BLD AUTO: 3.33 M/UL
RBC # BLD AUTO: 3.4 M/UL
RBC # BLD AUTO: 3.51 M/UL
SATURATED IRON: 31 %
SODIUM SERPL-SCNC: 140 MMOL/L
SODIUM UR-SCNC: 71 MMOL/L
TOTAL IRON BINDING CAPACITY: 265 UG/DL
TRANSFERRIN SERPL-MCNC: 179 MG/DL
UUN UR-MCNC: 837 MG/DL
VIT B12 SERPL-MCNC: 396 PG/ML
WBC # BLD AUTO: 5.42 K/UL
WBC # BLD AUTO: 5.92 K/UL
WBC # BLD AUTO: 6.11 K/UL

## 2018-04-02 PROCEDURE — 83540 ASSAY OF IRON: CPT

## 2018-04-02 PROCEDURE — 80048 BASIC METABOLIC PNL TOTAL CA: CPT

## 2018-04-02 PROCEDURE — 63600175 PHARM REV CODE 636 W HCPCS: Performed by: STUDENT IN AN ORGANIZED HEALTH CARE EDUCATION/TRAINING PROGRAM

## 2018-04-02 PROCEDURE — 99226 PR SUBSEQUENT OBSERVATION CARE,LEVEL III: CPT | Mod: GC,,, | Performed by: HOSPITALIST

## 2018-04-02 PROCEDURE — 85520 HEPARIN ASSAY: CPT | Mod: 91

## 2018-04-02 PROCEDURE — 82746 ASSAY OF FOLIC ACID SERUM: CPT

## 2018-04-02 PROCEDURE — 36415 COLL VENOUS BLD VENIPUNCTURE: CPT

## 2018-04-02 PROCEDURE — 85520 HEPARIN ASSAY: CPT

## 2018-04-02 PROCEDURE — 84300 ASSAY OF URINE SODIUM: CPT

## 2018-04-02 PROCEDURE — 84540 ASSAY OF URINE/UREA-N: CPT

## 2018-04-02 PROCEDURE — 25000003 PHARM REV CODE 250: Performed by: INTERNAL MEDICINE

## 2018-04-02 PROCEDURE — G0378 HOSPITAL OBSERVATION PER HR: HCPCS

## 2018-04-02 PROCEDURE — 85610 PROTHROMBIN TIME: CPT

## 2018-04-02 PROCEDURE — 99215 OFFICE O/P EST HI 40 MIN: CPT | Mod: GC,,, | Performed by: INTERNAL MEDICINE

## 2018-04-02 PROCEDURE — 82607 VITAMIN B-12: CPT

## 2018-04-02 PROCEDURE — 85730 THROMBOPLASTIN TIME PARTIAL: CPT

## 2018-04-02 PROCEDURE — 82728 ASSAY OF FERRITIN: CPT

## 2018-04-02 PROCEDURE — 25000003 PHARM REV CODE 250: Performed by: STUDENT IN AN ORGANIZED HEALTH CARE EDUCATION/TRAINING PROGRAM

## 2018-04-02 PROCEDURE — 85025 COMPLETE CBC W/AUTO DIFF WBC: CPT

## 2018-04-02 PROCEDURE — 82570 ASSAY OF URINE CREATININE: CPT

## 2018-04-02 RX ORDER — HEPARIN SODIUM,PORCINE/D5W 25000/250
18 INTRAVENOUS SOLUTION INTRAVENOUS CONTINUOUS
Status: DISCONTINUED | OUTPATIENT
Start: 2018-04-02 | End: 2018-04-04

## 2018-04-02 RX ORDER — POLYETHYLENE GLYCOL 3350, SODIUM SULFATE ANHYDROUS, SODIUM BICARBONATE, SODIUM CHLORIDE, POTASSIUM CHLORIDE 236; 22.74; 6.74; 5.86; 2.97 G/4L; G/4L; G/4L; G/4L; G/4L
4000 POWDER, FOR SOLUTION ORAL ONCE
Status: COMPLETED | OUTPATIENT
Start: 2018-04-02 | End: 2018-04-02

## 2018-04-02 RX ADMIN — CARVEDILOL 12.5 MG: 12.5 TABLET, FILM COATED ORAL at 09:04

## 2018-04-02 RX ADMIN — ATORVASTATIN CALCIUM 80 MG: 20 TABLET, FILM COATED ORAL at 08:04

## 2018-04-02 RX ADMIN — CARVEDILOL 12.5 MG: 12.5 TABLET, FILM COATED ORAL at 08:04

## 2018-04-02 RX ADMIN — PANTOPRAZOLE SODIUM 40 MG: 40 TABLET, DELAYED RELEASE ORAL at 04:04

## 2018-04-02 RX ADMIN — NIFEDIPINE 60 MG: 60 TABLET, FILM COATED, EXTENDED RELEASE ORAL at 08:04

## 2018-04-02 RX ADMIN — HEPARIN SODIUM 18 UNITS/KG/HR: 10000 INJECTION, SOLUTION INTRAVENOUS at 12:04

## 2018-04-02 RX ADMIN — ALLOPURINOL 100 MG: 100 TABLET ORAL at 08:04

## 2018-04-02 RX ADMIN — HEPARIN SODIUM 12.01 UNITS/KG/HR: 10000 INJECTION, SOLUTION INTRAVENOUS at 04:04

## 2018-04-02 RX ADMIN — FENOFIBRATE 200 MG: 200 CAPSULE ORAL at 09:04

## 2018-04-02 RX ADMIN — HEPARIN SODIUM 12.01 UNITS/KG/HR: 10000 INJECTION, SOLUTION INTRAVENOUS at 06:04

## 2018-04-02 RX ADMIN — SODIUM POLYSTYRENE SULFONATE 15 G: 15 SUSPENSION ORAL; RECTAL at 08:04

## 2018-04-02 RX ADMIN — POLYETHYLENE GLYCOL 3350, SODIUM SULFATE ANHYDROUS, SODIUM BICARBONATE, SODIUM CHLORIDE, POTASSIUM CHLORIDE 4000 ML: 236; 22.74; 6.74; 5.86; 2.97 POWDER, FOR SOLUTION ORAL at 06:04

## 2018-04-02 RX ADMIN — OMEGA-3-ACID ETHYL ESTERS 2 G: 1 CAPSULE, LIQUID FILLED ORAL at 08:04

## 2018-04-02 RX ADMIN — ISOSORBIDE MONONITRATE 120 MG: 120 TABLET ORAL at 08:04

## 2018-04-02 RX ADMIN — PANTOPRAZOLE SODIUM 40 MG: 40 TABLET, DELAYED RELEASE ORAL at 08:04

## 2018-04-02 RX ADMIN — OMEGA-3-ACID ETHYL ESTERS 2 G: 1 CAPSULE, LIQUID FILLED ORAL at 09:04

## 2018-04-02 NOTE — HPI
Patient is a 77-year-old man with diabetes, hypertension, CAD, HLD, gout and mechanical aortic valve who presents with bloody stool.  Patient was recently admitted for diarrhea that was watery, and non-bloody. During his admission the patient developed an JIM, and with fluid replacement he developed hyperkalemia, which when being corrected the patient developed an NSTEMI. [facility] determined no acute intervention was needed but patient is instructed to follow up. Patient also had a nose bleed during admission and was found to have an elevated INR of 4.4, but that came down to 1.6 and the patient had ASA, Plavix during hospitalization and was discharged with Lovenox and coumadin. At the end of hospitalization, the patient had normal BM. After hospitalization, patient had 2 days of no BM, followed by 3-5 loose stools on Saturday. Today the patient notes that he had a loose bowel movement and noted pinkish water in the bowl after word. At 1 pm the patient had a second BM, in which he and his wife noted the same pinkish red bloody color in the bowl as before. Patient's wife was concerned and brought him to the hospital. Patient had been taking 1 lovenox per day for the last 3 days, and coumadin and was supposed to present to the coumadin clinic tomorrow. Pt states that he had 1 BM while in ED that was not bloody, although this was not witnessed by others. Pt states that he has had diarrhea for months now, losing around 30lb from November until now. He has not seen a GI physician for workup of diarrhea. Pt denies abdo pain, chest pain, SOB, dizziness, lightheadedness, fatigue, leg swelling.

## 2018-04-02 NOTE — PLAN OF CARE
Problem: Patient Care Overview  Goal: Plan of Care Review  Outcome: Ongoing (interventions implemented as appropriate)  Pt remains free from falls and injury. Pt makes statement of no pain. Infusing Heparin at 14.9ml/hr as ordered. Bed low and locked, call light within reach.

## 2018-04-02 NOTE — PROGRESS NOTES
Wife called to cancel today's lab appointment due to the Patient being in the hospital, advised wife to call coumadin clinic when the Patient's discharged

## 2018-04-02 NOTE — ASSESSMENT & PLAN NOTE
- Patient is a 77 y.o. male with mechanical aortic valve on warfarin and lovenox, who presents with hematochezia.   - Suspect: GIB due to anticoagulation use in hospital and upon discharge combined with constipation/diarrhea. Patient notes the blood to be in the bowl, and not on the stool. As well there is no abdominal pain and a normal colonoscopy 1 year ago with no change in stool caliber. No significant NSAID use. This is also the first time  - DDx: PUD- no abdo pain vs diverticulosis- no abdo pain, normal colonoscopy 1 year ago vs colon cancer- no change in stool caliber or additional symptoms (weight loss likely due to diarrhea and lack of appetite) vs hemmorhoids- possible but this is less likely being the first time  - Lab/rad/micro: INR subtherapeutic & aPTT subtherapeutic- unclear reason as to why because patient has been on chronic anticoagulation, but bleed may be due to constipation/diarrhea mechanical irritation of the tract.  - Consult: can consider GI if bleeding persists, but at this time we will not pursue this.  - Treatment: PPI IV to oral tomorrow, stop Lovenox and coumadin and start heparin drip, NPO at midnight- in case the bleeding gets much worse  - Status: currently patient is asymptomatic/stable, continue to monitor

## 2018-04-02 NOTE — HPI
This is a 78 y/o male with hx chronic diarrhea, diveritculitis s/p left sided colon resection?, DM, HTN, CAD, mechanical AV who presented with blood in stool.  Pt recently had admission for watery nonbloody diarrhea. Discharged last Wednesday. Also recently had NSTEMI, no intervention on [facility].  After discharge, patient had 2 days of constipation and this was followed by 3 -5 loose stools on Saturday.   That progressed to pinkish red color in the toilet bowl. His wife made him come in for evaluation.  He has been taking lovenox daily as outpatient as well as coumadin. He is not on asa or plavix.    Labs show Hgb stable and at baseline. Vital and hemodynamics stable.    Had colonoscopy 1 year ago - normal. Biopsies taken for micro colitis - negative.

## 2018-04-02 NOTE — ASSESSMENT & PLAN NOTE
- Patient is asymptomatic and stable- Patient with pre-diabetes, not on insulin.   - Holding medication as patient has normal sugars, can monitor and start SSI as needed

## 2018-04-02 NOTE — ASSESSMENT & PLAN NOTE
- Patient is asymptomatic and stable  - Stopped home meds- patient was being bridged from lovenox to coumadin, but due to bleeding the patient was placed on heparin drip to ensure stoppage of acute bleed  - Continue to monitor

## 2018-04-02 NOTE — ASSESSMENT & PLAN NOTE
- Patient with Cr baseline appearing to be 1.6-1.9, with patient now at 1.9.  - Suspect: Pre-renal losses due to dehydration.  - Labs: Will run Tiago, UCr, Uurea to calculate FENa and FEUrea and better determine cause of JIM  - Treatment: LR 1L bolus- will be careful as patient has diastolic dysfn   - Status: currently patient is asymptomatic/stable, continue to monitor

## 2018-04-02 NOTE — ASSESSMENT & PLAN NOTE
- Patient is asymptomatic and stable- elevated BP upon admission 203/84, down to 160's/70s- patient did not have his evening doses of BP medication  - Treating with home meds- coreg, Imdur, nifedipine  - holding lasix given loose stools

## 2018-04-02 NOTE — ASSESSMENT & PLAN NOTE
Maroon stool produced on 4/2 which was guiac positive  Patient is on chronic anticoagulation for mechanical aortic valve, had been bridging to coumadin on lovenox at home  Continued on heparin gtt on admission  Suspect lower GI bleed based on hemodynamic stability and unchanging hemoglobin (from one week ago)  - continue PPI  - keep NPO  - keep on heparin gtt for now, pending possible intervention  - consulted CRS  - consulted GI  - CBC q6h

## 2018-04-02 NOTE — H&P
[facility] Hospital Medicine  History & Physical    Patient Name: Dariel Urbina  MRN: [id number]  Admission Date: 4/1/2018  Attending Physician: Abdi Watson MD   Primary Care Provider: Devon Langston MD    [facility] Medicine Team: [facility] HOSP MED 3 Papa Ghotra MD     Patient information was obtained from patient and ER records.     Subjective:     Principal Problem:Gastrointestinal hemorrhage    Chief Complaint:   Chief Complaint   Patient presents with    Rectal Bleeding     bleeding began tjhis am . Just dc'd from hospital  and is on blood thinners. Onset bleeding 2 hrs ago. Deneis SOB or chest manjeet         HPI: Patient is a 77-year-old man with diabetes, hypertension, CAD, HLD, gout and mechanical aortic valve who presents with bloody stool.  Patient was recently admitted for diarrhea that was watery, and non-bloody. During his admission the patient developed an JIM, and with fluid replacement he developed hyperkalemia, which when being corrected the patient developed an NSTEMI. [facility] determined no acute intervention was needed but patient is instructed to follow up. Patient also had a nose bleed during admission and was found to have an elevated INR of 4.4, but that came down to 1.6 and the patient had ASA, Plavix during hospitalization and was discharged with Lovenox and coumadin. At the end of hospitalization, the patient had normal BM. After hospitalization, patient had 2 days of no BM, followed by 3-5 loose stools on Saturday. Today the patient notes that he had a loose bowel movement and noted pinkish water in the bowl after word. At 1 pm the patient had a second BM, in which he and his wife noted the same pinkish red bloody color in the bowl as before. Patient's wife was concerned and brought him to the hospital. Patient had been taking 1 lovenox per day for the last 3 days, and coumadin and was supposed to present to the coumadin clinic tomorrow. Pt states that he had 1 BM  while in ED that was not bloody, although this was not witnessed by others. Pt states that he has had diarrhea for months now, losing around 30lb from November until now. He has not seen a GI physician for workup of diarrhea. Pt denies abdo pain, chest pain, SOB, dizziness, lightheadedness, fatigue, leg swelling.      Past Medical History:   Diagnosis Date    Angina, class III 9/17/2014    Bilateral carotid artery disease 2/9/2017    CAD (coronary atherosclerotic disease) 9/11/2012    Cataract     Chronic kidney disease     Claudication of left lower extremity 9/17/2014    DM (diabetes mellitus) 9/26/2012    History of gout 9/26/2012    Hyperlipidemia     Hypertension     Mechanical heart valve present     NSTEMI (non-ST elevated myocardial infarction) 3/21/2018       Past Surgical History:   Procedure Laterality Date    CARDIAC CATHETERIZATION      CARDIAC VALVE REPLACEMENT      CARDIAC VALVE SURGERY      COLON SURGERY      COLONOSCOPY N/A 3/31/2017    Procedure: COLONOSCOPY;  Surgeon: Bruno Raymond MD;  Location: [address]);  Service: Endoscopy;  Laterality: N/A;  Patient's wife requesting date.    CORONARY ANGIOPLASTY      CORONARY ARTERY BYPASS GRAFT      HERNIA REPAIR      SPINE SURGERY      VASECTOMY         Review of patient's allergies indicates:   Allergen Reactions    Fosinopril      Intolerance- elevates potassium level      Losartan      Intolerance- elevates potassium level       No current facility-administered medications on file prior to encounter.      Current Outpatient Prescriptions on File Prior to Encounter   Medication Sig    allopurinol (ZYLOPRIM) 100 MG tablet TAKE 1 TABLET ONE TIME DAILY    atorvastatin (LIPITOR) 80 MG tablet TAKE 1 TABLET ONE TIME DAILY    carvedilol (COREG) 6.25 MG tablet Take 2 tablets (12.5 mg total) by mouth 2 (two) times daily.    clotrimazole-betamethasone 1-0.05% (LOTRISONE) cream Apply topically once daily. in between the 4th  and 5th toes left foot.    enoxaparin (LOVENOX) 120 mg/0.8 mL Syrg Inject 0.8 mLs (120 mg total) into the skin once daily.    fenofibrate micronized (LOFIBRA) 200 MG Cap Take 200 mg by mouth daily with breakfast.    ferrous sulfate 325 (65 FE) MG EC tablet Take 1 tablet (325 mg total) by mouth once daily.    furosemide (LASIX) 20 MG tablet TAKE 1 TABLET ONE TIME DAILY    glimepiride (AMARYL) 1 MG tablet Take 1 tablet (1 mg total) by mouth every morning.    isosorbide mononitrate (IMDUR) 120 MG 24 hr tablet Take 1 tablet (120 mg total) by mouth once daily.    NIFEdipine (PROCARDIA-XL) 30 MG (OSM) 24 hr tablet Take 2 tablets (60 mg total) by mouth once daily.    omega-3 acid ethyl esters (LOVAZA) 1 gram capsule Take 2 capsules (2 g total) by mouth 2 (two) times daily.    ranitidine (ZANTAC) 300 MG tablet Take 1 tablet (300 mg total) by mouth every evening.    warfarin (COUMADIN) 5 MG tablet Take 1 tablet (5 mg total) by mouth Daily. Current Dose (7.5 mg on Mon, Wed, Fri; 5 mg all other days)    [] metroNIDAZOLE (FLAGYL) 500 MG tablet Take 1 tablet (500 mg total) by mouth every 12 (twelve) hours.    nitroGLYCERIN (NITROSTAT) 0.4 MG SL tablet Place 1 tablet (0.4 mg total) under the tongue every 5 (five) minutes as needed. As needed for chest pain     Family History     Problem Relation (Age of Onset)    Alcohol abuse Father    Diabetes Brother    Heart disease Mother, Father, Brother, Sister    Heart failure Mother, Father, Brother    No Known Problems Sister, Maternal Grandmother, Maternal Grandfather, Paternal Grandmother, Paternal Grandfather, Maternal Aunt, Maternal Uncle, Paternal Aunt, Paternal Uncle        Social History Main Topics    Smoking status: Former Smoker     Packs/day: 1.00     Years: 20.00     Quit date: 1980    Smokeless tobacco: Never Used    Alcohol use No    Drug use: No    Sexual activity: No     Review of Systems   Constitutional: Negative for chills, fatigue, fever  and unexpected weight change.   HENT: Negative for hearing loss.    Eyes: Negative for visual disturbance.   Respiratory: Negative for shortness of breath.    Cardiovascular: Negative for chest pain.   Gastrointestinal: Positive for blood in stool, constipation and diarrhea. Negative for abdominal pain, nausea and vomiting.   Genitourinary: Negative for dysuria and hematuria.   Musculoskeletal: Negative for arthralgias.   Skin: Negative for rash.   Neurological: Negative for dizziness, seizures, weakness, light-headedness and headaches.   Hematological: Negative for adenopathy. Does not bruise/bleed easily.     Objective:     Vital Signs (Most Recent):  Temp: 98.3 °F (36.8 °C) (04/01/18 1717)  Pulse: (!) 56 (04/01/18 2131)  Resp: 18 (04/01/18 2131)  BP: (!) 173/74 (04/01/18 2117)  SpO2: 97 % (04/01/18 2131) Vital Signs (24h Range):  Temp:  [98.3 °F (36.8 °C)] 98.3 °F (36.8 °C)  Pulse:  [51-62] 56  Resp:  [15-19] 18  SpO2:  [97 %-99 %] 97 %  BP: (165-203)/(72-84) 173/74     Weight: 82.6 kg (182 lb)  Body mass index is 29.38 kg/m².    Physical Exam   Constitutional: He is oriented to person, place, and time. He appears well-developed and well-nourished. No distress.   HENT:   Head: Normocephalic and atraumatic.   Eyes: EOM are normal. Pupils are equal, round, and reactive to light.   Neck: No tracheal deviation present. No thyromegaly present.   Cardiovascular: Normal rate and regular rhythm.    Murmur (mechanical valve) heard.  Pulmonary/Chest: Effort normal and breath sounds normal. No respiratory distress. He has no wheezes. He has no rales.   Abdominal: Soft. There is no tenderness. No hernia.   Musculoskeletal: Normal range of motion. He exhibits no edema or tenderness.   Neurological: He is alert and oriented to person, place, and time. No cranial nerve deficit. He exhibits normal muscle tone.   Skin: Skin is warm. No rash noted. He is not diaphoretic.   Psychiatric: He has a normal mood and affect.   Vitals  reviewed.        CRANIAL NERVES     CN III, IV, VI   Pupils are equal, round, and reactive to light.  Extraocular motions are normal.        Significant Labs:   CBC:   Recent Labs  Lab 04/01/18  1808   WBC 5.47   HGB 10.2*   HCT 30.9*        CMP:   Recent Labs  Lab 04/01/18  1808      K 5.0      CO2 22*      BUN 50*   CREATININE 1.9*   CALCIUM 9.5   PROT 7.6   ALBUMIN 3.6   BILITOT 0.3   ALKPHOS 69   AST 62*   ALT 55*   ANIONGAP 7*   EGFRNONAA 33.5*     Coagulation:   Recent Labs  Lab 04/01/18  1808   INR 1.6*   APTT 36.4*       Significant Imaging: I have reviewed all pertinent imaging results/findings within the past 24 hours.       Assessment/Plan:     * Gastrointestinal hemorrhage    - Patient is a 77 y.o. male with mechanical aortic valve on warfarin and lovenox, who presents with hematochezia.   - Suspect: GIB due to anticoagulation use in hospital and upon discharge combined with constipation/diarrhea. Patient notes the blood to be in the bowl, and not on the stool. As well there is no abdominal pain and a normal colonoscopy 1 year ago with no change in stool caliber. No significant NSAID use. This is also the first time  - DDx: PUD- no abdo pain vs diverticulosis- no abdo pain, normal colonoscopy 1 year ago vs colon cancer- no change in stool caliber or additional symptoms (weight loss likely due to diarrhea and lack of appetite) vs hemmorhoids- possible but this is less likely being the first time  - Lab/rad/micro: INR subtherapeutic & aPTT subtherapeutic- unclear reason as to why because patient has been on chronic anticoagulation, but bleed may be due to constipation/diarrhea mechanical irritation of the tract.  - Consult: can consider GI if bleeding persists, but at this time we will not pursue this.  - Treatment: PPI IV to oral tomorrow, stop Lovenox and coumadin and start heparin drip, NPO at midnight- in case the bleeding gets much worse  - Status: currently patient is  asymptomatic/stable, continue to monitor            Acute renal failure superimposed on stage 3 chronic kidney disease    - Patient with Cr baseline appearing to be 1.6-1.9, with patient now at 1.9.  - Suspect: Pre-renal losses due to dehydration.  - Labs: Will run Tiago, UCr, Uurea to calculate FENa and FEUrea and better determine cause of JIM  - Treatment: LR 1L bolus- will be careful as patient has diastolic dysfn   - Status: currently patient is asymptomatic/stable, continue to monitor          CAD (coronary atherosclerotic disease)    - Patient is asymptomatic- without chest pain, SOB  - Currently not on ASA or plavix due to recurrent nosebleeds  - Maintain HTN and HLD medications  - Continue to monitor            H/O mechanical aortic valve replacement    - Patient is asymptomatic and stable  - Stopped home meds- patient was being bridged from lovenox to coumadin, but due to bleeding the patient was placed on heparin drip to ensure stoppage of acute bleed  - Continue to monitor            Long term current use of anticoagulant therapy    - see above          Type 2 diabetes mellitus with stage 3 chronic kidney disease, without long-term current use of insulin    - Patient is asymptomatic and stable- Patient with pre-diabetes, not on insulin.   - Holding medication as patient has normal sugars, can monitor and start SSI as needed              Renovascular hypertension    - Patient is asymptomatic and stable- elevated BP upon admission 203/84, down to 160's/70s- patient did not have his evening doses of BP medication  - Treating with home meds- coreg, Imdur, nifedipine  - Hold lasix for now as patient appears volume down with diarrhea and JIM, re-institute once JIM resolves  - Continue to monitor            Hyperlipidemia    - Patient is asymptomatic and stable  - Treating with home meds- lipitor and fenofibrate  - Continue to monitor            VTE Risk Mitigation         Ordered     heparin 25,000 units in  "dextrose 5% 250 mL (100 units/mL) infusion  Continuous     Route:  Intravenous        04/01/18 9289             Papa Ghotra MD  Department of Hospital Medicine   [facility]    STAFF PHYSICIAN NOTE  Attending Attestation for Rounds with Resident                          I have seen the patient, reviewed the resident's history, physical, assessment, and plan. I have personally interviewed and examined the patient at bedside and agree with the resident's findings but with my additional findings.                                ________________________________________    [facility] Medicine Team: 1                                                   REASON FOR ADMISSION: Gastrointestinal hemorrhage    Blood pressure (!) 100/48, pulse (!) 57, temperature 97.7 °F (36.5 °C), temperature source Oral, resp. rate 18, height 5' 6" (1.676 m), weight 75.8 kg (167 lb 1.7 oz), SpO2 (!) 94 %. Body mass index is 26.97 kg/m².    PERTINENT DIAGNOSTIC FINDINGS:  I have personally reviewed patient's pertinent labs, medications and diagnostic studies.                                                          ASSESSMENT & PLANS    Active Hospital Problems    Diagnosis  POA    *Gastrointestinal hemorrhage [K92.2]  Yes    Diverticulosis of intestine with bleeding [K57.91]  Yes    Hemorrhoid [K64.9]  Yes    CKD (chronic kidney disease) stage 3, GFR 30-59 ml/min [N18.3]  Yes    H/O mechanical aortic valve replacement [Z95.2]  Not Applicable    Type 2 diabetes mellitus with stage 3 chronic kidney disease, without long-term current use of insulin [E11.22, N18.3]  Yes    Long term current use of anticoagulant therapy [Z79.01]  Not Applicable    Hyperlipidemia [E78.5]  Yes    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]  Yes    CAD (coronary atherosclerotic disease) [I25.10]  Yes      Resolved Hospital Problems    Diagnosis Date Resolved POA    Acute renal failure superimposed on stage 3 chronic kidney disease [N17.9, N18.3] " 04/02/2018 Yes         Lenin Rosenthal MD  Attending Staff Physician   Hospital Medicine  Pager: [phone]  Spectralink: [id number]

## 2018-04-02 NOTE — ASSESSMENT & PLAN NOTE
- Patient is asymptomatic and stable  - Stopped home meds- patient was being bridged from lovenox to coumadin, but due to bleeding the patient was placed on heparin drip for short half life

## 2018-04-02 NOTE — MEDICAL/APP STUDENT
[facility]  Gastroenterology  Consult Note    Patient Name: Dariel Urbina  MRN: [id number]  Admission Date: 4/1/2018  Hospital Length of Stay: 0 days  Code Status: Full Code   Attending Provider: Abdi Watson MD   Consulting Provider: Kasia Castillo  Primary Care Physician: Devon Langston MD  Principal Problem:Gastrointestinal hemorrhage    Consults  Subjective:     HPI: Dariel Urbina is a 77M with PMH has PMH for HTN, CKD III, HLD, CAD, DMII, s/p mechanical aortic valve placement, diastolic heart failure, diverticulosis (s/p left-sided colectomy), chronic diarrhea presenting with a 1 day history of BRBPR. GI was consulted to work-up a possible GIB.    Patient was discharged from [facility] on 3/28 for watery, non-bloody diarrhea. On admission he developed JIM and then with fluid replacement developed hyperkalemia and subsequently an NSTEMI. He was found to have elevated INR of 4.4 which was corrected. Patient was previously on ASA and Plavix and was discharged with Lovenox and Coumadin. The day prior to discharge he had one episode of melanic stools which stopped prior to discharge. He had normal bowel movements up until Saturday when he began having watery diarrhea again.     Sunday he had two episodes of significantly bloody diarrhea with no melena in which his wife brought him to the ED. He denies N/V, F/C or any abdominal or rectal pain. Pt also denies chest pain, SOB, dizziness, lightheadedness, fatigue, leg swelling. He currently states that he feels well and his H/H and VS are currently stable.       Patient denies NSAID use, smoking or alcohol use. Last colonoscopy was performed by Dr. Raymond one year ago showing normal colonic mucosa and normal colonic biopsies.    Past Medical History:   Diagnosis Date    Angina, class III 9/17/2014    Bilateral carotid artery disease 2/9/2017    CAD (coronary atherosclerotic disease) 9/11/2012    Cataract     Chronic kidney disease      Claudication of left lower extremity 9/17/2014    DM (diabetes mellitus) 9/26/2012    History of gout 9/26/2012    Hyperlipidemia     Hypertension     Mechanical heart valve present     NSTEMI (non-ST elevated myocardial infarction) 3/21/2018       Past Surgical History:   Procedure Laterality Date    CARDIAC CATHETERIZATION      CARDIAC VALVE REPLACEMENT      CARDIAC VALVE SURGERY      COLON SURGERY      COLONOSCOPY N/A 3/31/2017    Procedure: COLONOSCOPY;  Surgeon: Bruno Raymond MD;  Location: [address]);  Service: Endoscopy;  Laterality: N/A;  Patient's wife requesting date.    CORONARY ANGIOPLASTY      CORONARY ARTERY BYPASS GRAFT      HERNIA REPAIR      SPINE SURGERY      VASECTOMY         Review of patient's allergies indicates:   Allergen Reactions    Fosinopril      Intolerance- elevates potassium level      Losartan      Intolerance- elevates potassium level     Family History     Problem Relation (Age of Onset)    Alcohol abuse Father    Diabetes Brother    Heart disease Mother, Father, Brother, Sister    Heart failure Mother, Father, Brother    No Known Problems Sister, Maternal Grandmother, Maternal Grandfather, Paternal Grandmother, Paternal Grandfather, Maternal Aunt, Maternal Uncle, Paternal Aunt, Paternal Uncle        Social History Main Topics    Smoking status: Former Smoker     Packs/day: 1.00     Years: 20.00     Quit date: 9/11/1980    Smokeless tobacco: Never Used    Alcohol use No    Drug use: No    Sexual activity: No     Review of Systems   Constitutional: Negative for activity change, chills and fever.   Gastrointestinal: Positive for anal bleeding and diarrhea. Negative for abdominal distention, abdominal pain, blood in stool, nausea, rectal pain and vomiting.   Endocrine: Negative for polydipsia, polyphagia and polyuria.   Neurological: Negative for weakness and headaches.   Hematological: Bruises/bleeds easily.     Objective:     Vital Signs (Most  Recent):  Temp: 97.1 °F (36.2 °C) (04/02/18 1153)  Pulse: (!) 56 (04/02/18 1156)  Resp: 16 (04/02/18 1153)  BP: (!) 147/67 (04/02/18 1153)  SpO2: 96 % (04/02/18 1153) Vital Signs (24h Range):  Temp:  [97.1 °F (36.2 °C)-98.3 °F (36.8 °C)] 97.1 °F (36.2 °C)  Pulse:  [49-62] 56  Resp:  [15-19] 16  SpO2:  [93 %-99 %] 96 %  BP: (147-203)/(67-84) 147/67     Weight: 75.8 kg (167 lb 1.7 oz) (04/02/18 0000)  Body mass index is 26.97 kg/m².      Intake/Output Summary (Last 24 hours) at 04/02/18 1314  Last data filed at 04/01/18 1855   Gross per 24 hour   Intake              510 ml   Output                0 ml   Net              510 ml       Lines/Drains/Airways     Peripheral Intravenous Line                 Peripheral IV - Single Lumen 04/01/18 1808 Left Antecubital less than 1 day                Physical Exam   Constitutional: He is oriented to person, place, and time. He appears well-developed and well-nourished. No distress.   HENT:   Head: Normocephalic and atraumatic.   Mouth/Throat: No oropharyngeal exudate.   Eyes: Conjunctivae and EOM are normal. Pupils are equal, round, and reactive to light. Right eye exhibits no discharge. Left eye exhibits no discharge. No scleral icterus.   Neck: Normal range of motion. Neck supple. No tracheal deviation present. No thyromegaly present.   Cardiovascular: Normal rate, regular rhythm and normal heart sounds.  Exam reveals no gallop and no friction rub.    No murmur heard.  Pulmonary/Chest: Effort normal and breath sounds normal. No respiratory distress.   Abdominal: Soft. Bowel sounds are normal. He exhibits no distension and no mass. There is no tenderness. There is no rebound and no guarding. No hernia.   Genitourinary: Rectum normal. Rectal exam shows no external hemorrhoid, no internal hemorrhoid, no fissure, no mass, no tenderness, anal tone normal and guaiac negative stool. No penile tenderness.   Genitourinary Comments: Small amount of blood mixed stool present on RADHA    Musculoskeletal: Normal range of motion. He exhibits no edema or deformity.   Neurological: He is alert and oriented to person, place, and time. He displays normal reflexes. No cranial nerve deficit or sensory deficit. He exhibits normal muscle tone. Coordination normal.   Skin: Skin is warm and dry. He is not diaphoretic. No erythema.   Psychiatric: He has a normal mood and affect. His behavior is normal. Judgment and thought content normal.   Nursing note and vitals reviewed.      Significant Labs:     Recent Labs  Lab 04/01/18  2308 04/02/18  0408 04/02/18  1241   WBC 5.86 5.42 5.92   HGB 10.2* 10.4* 10.2*   HCT 31.0* 32.2* 30.7*    187 175     BMP:   Recent Labs  Lab 04/01/18  1808 04/02/18  0407    61*    140   K 5.0 5.4*    111*   CO2 22* 22*   BUN 50* 42*   CREATININE 1.9* 1.6*   CALCIUM 9.5 9.8   MG 2.0  --      CMP:   Recent Labs  Lab 04/01/18  1808 04/02/18  0407    61*   CALCIUM 9.5 9.8   ALBUMIN 3.6  --    PROT 7.6  --     140   K 5.0 5.4*   CO2 22* 22*    111*   BUN 50* 42*   CREATININE 1.9* 1.6*   ALKPHOS 69  --    ALT 55*  --    AST 62*  --    BILITOT 0.3  --      Coagulation:   Recent Labs  Lab 04/02/18  0407   INR 1.7*   APTT 85.2*     Lipase:   Recent Labs  Lab 04/01/18  1808   LIPASE 82*     Liver Function Test:   Recent Labs  Lab 04/01/18 1808   ALT 55*   AST 62*   ALKPHOS 69   BILITOT 0.3   PROT 7.6   ALBUMIN 3.6       Significant Imaging:  Imaging results within the past 24 hours have been reviewed.    Assessment/Plan:     Active Diagnoses:    Diagnosis Date Noted POA    PRINCIPAL PROBLEM:  Gastrointestinal hemorrhage [K92.2] 04/01/2018 Yes    Acute renal failure superimposed on stage 3 chronic kidney disease [N17.9, N18.3] 03/19/2018 Yes    H/O mechanical aortic valve replacement [Z95.2] 09/17/2014 Not Applicable    Type 2 diabetes mellitus with stage 3 chronic kidney disease, without long-term current use of insulin [E11.22, N18.3]  10/02/2013 Yes    Long term current use of anticoagulant therapy [Z79.01] 09/26/2012 Not Applicable    Hyperlipidemia [E78.5] 09/26/2012 Yes    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] 09/26/2012 Yes    CAD (coronary atherosclerotic disease) [I25.10] 09/11/2012 Yes      Problems Resolved During this Admission:    Diagnosis Date Noted Date Resolved POA       ***    Thank you for your consult. I will follow-up with patient. Please contact us if you have any additional questions.    Kasia Castillo  Gastroenterology  [facility]

## 2018-04-02 NOTE — ASSESSMENT & PLAN NOTE
HYPERTENSION  HYPERLIPIDEMIA    - Patient is asymptomatic- without chest pain, SOB  - Currently not on ASA or plavix due to recurrent nosebleeds  - Maintain HTN and HLD medications  - Continue to monitor

## 2018-04-02 NOTE — ASSESSMENT & PLAN NOTE
- Patient is asymptomatic- without chest pain, SOB  - Currently not on ASA or plavix due to recurrent nosebleeds  - Maintain HTN and HLD medications  - Continue to monitor

## 2018-04-02 NOTE — HOSPITAL COURSE
Mr. Urbina was admitted to hospital medicine on 4/1 for bloody bowel movements. On 4/2, he had an observed maroon bowel movement which was FOBT positive. Colorectal surgery was consulted, who requested that GI be consulted as well. Patient underwent colonoscopy on 4/3 which revealed bleeding diverticulum, which was clipped, as well as two polyps (not resected) and hemorrhoids. Bloody stools stopped on 4/4 and the patient was discharged home, with instructions to resume lovenox bridge to warfarin and follow up in 3 months for repeat colonoscopy.

## 2018-04-02 NOTE — ASSESSMENT & PLAN NOTE
- Patient is asymptomatic and stable  - Treating with home meds- lipitor and fenofibrate  - Continue to monitor

## 2018-04-02 NOTE — PROGRESS NOTES
[facility] Hospital Medicine  Progress Note    Patient Name: Dariel Urbina  MRN: [id number]  Patient Class: OP- Observation   Admission Date: 4/1/2018  Length of Stay: 0 days  Attending Physician: Abdi Watson MD  Primary Care Provider: Devon Langston MD    [facility] Medicine Team: [facility] HOSP MED 3 Liz Mata DO    Subjective:     Principal Problem:Gastrointestinal hemorrhage    HPI:  Patient is a 77-year-old man with diabetes, hypertension, CAD, HLD, gout and mechanical aortic valve who presents with bloody stool.  Patient was recently admitted for diarrhea that was watery, and non-bloody. During his admission the patient developed an JIM, and with fluid replacement he developed hyperkalemia, which when being corrected the patient developed an NSTEMI. [facility] determined no acute intervention was needed but patient is instructed to follow up. Patient also had a nose bleed during admission and was found to have an elevated INR of 4.4, but that came down to 1.6 and the patient had ASA, Plavix during hospitalization and was discharged with Lovenox and coumadin. At the end of hospitalization, the patient had normal BM. After hospitalization, patient had 2 days of no BM, followed by 3-5 loose stools on Saturday. Today the patient notes that he had a loose bowel movement and noted pinkish water in the bowl after word. At 1 pm the patient had a second BM, in which he and his wife noted the same pinkish red bloody color in the bowl as before. Patient's wife was concerned and brought him to the hospital. Patient had been taking 1 lovenox per day for the last 3 days, and coumadin and was supposed to present to the coumadin clinic tomorrow. Pt states that he had 1 BM while in ED that was not bloody, although this was not witnessed by others. Pt states that he has had diarrhea for months now, losing around 30lb from November until now. He has not seen a GI physician for workup of diarrhea. Pt  denies abdo pain, chest pain, SOB, dizziness, lightheadedness, fatigue, leg swelling.      Hospital Course:  Mr. Urbina was admitted to hospital medicine on 4/1 for bloody bowel movements. On 4/2, he had an observed maroon bowel movement which was FOBT positive. Colorectal surgery was consulted, who requested that GI be consulted as well.     Interval History: Patient had one episode of loose stool this morning which was maroon-colored. Patient denies any lightheadedness, chest pain, or shortness of breath. No abdominal pain or n/v.     Review of Systems   Constitutional: Negative for chills and fever.   Respiratory: Negative for cough, shortness of breath and wheezing.    Cardiovascular: Negative for chest pain and palpitations.   Gastrointestinal: Positive for blood in stool and diarrhea. Negative for abdominal pain, constipation, nausea and vomiting.   Genitourinary: Negative for dysuria and hematuria.   Musculoskeletal: Negative for back pain and neck pain.   Skin: Negative for color change and rash.   Neurological: Negative for light-headedness and headaches.     Objective:     Vital Signs (Most Recent):  Temp: 97.1 °F (36.2 °C) (04/02/18 1153)  Pulse: (!) 56 (04/02/18 1156)  Resp: 16 (04/02/18 1153)  BP: (!) 147/67 (04/02/18 1153)  SpO2: 96 % (04/02/18 1153) Vital Signs (24h Range):  Temp:  [97.1 °F (36.2 °C)-98.3 °F (36.8 °C)] 97.1 °F (36.2 °C)  Pulse:  [49-62] 56  Resp:  [15-19] 16  SpO2:  [93 %-99 %] 96 %  BP: (147-203)/(67-84) 147/67     Weight: 75.8 kg (167 lb 1.7 oz)  Body mass index is 26.97 kg/m².    Intake/Output Summary (Last 24 hours) at 04/02/18 1437  Last data filed at 04/01/18 1855   Gross per 24 hour   Intake              510 ml   Output                0 ml   Net              510 ml      Physical Exam   Constitutional: He is oriented to person, place, and time. He appears well-developed and well-nourished. No distress.   HENT:   Head: Normocephalic and atraumatic.   Mouth/Throat: Oropharynx is  clear and moist.   Eyes: Conjunctivae and EOM are normal. No scleral icterus.   Neck: Normal range of motion. Neck supple.   Cardiovascular: Normal rate and regular rhythm.    Audible click   Pulmonary/Chest: Effort normal and breath sounds normal. No respiratory distress. He has no wheezes. He has no rales.   Abdominal: Soft. Bowel sounds are normal. He exhibits no distension. There is no tenderness. There is no guarding.   FOBT performed on maroon stool (not RADHA) was guiac positive   Musculoskeletal: Normal range of motion. He exhibits no edema.   Neurological: He is alert and oriented to person, place, and time.   Skin: Skin is warm and dry. No rash noted. He is not diaphoretic.   Psychiatric: He has a normal mood and affect. His behavior is normal.       Significant Labs:   CBC:   Recent Labs  Lab 04/01/18  2308 04/02/18  0408 04/02/18  1241   WBC 5.86 5.42 5.92   HGB 10.2* 10.4* 10.2*   HCT 31.0* 32.2* 30.7*    187 175     CMP:   Recent Labs  Lab 04/01/18  1808 04/02/18  0407    140   K 5.0 5.4*    111*   CO2 22* 22*    61*   BUN 50* 42*   CREATININE 1.9* 1.6*   CALCIUM 9.5 9.8   PROT 7.6  --    ALBUMIN 3.6  --    BILITOT 0.3  --    ALKPHOS 69  --    AST 62*  --    ALT 55*  --    ANIONGAP 7* 7*   EGFRNONAA 33.5* 41.2*       Significant Imaging: I have reviewed all pertinent imaging results/findings within the past 24 hours.    Assessment/Plan:      * Gastrointestinal hemorrhage    Maroon stool produced on 4/2 which was guiac positive  Patient is on chronic anticoagulation for mechanical aortic valve, had been bridging to coumadin on lovenox at home  Continued on heparin gtt on admission  Suspect lower GI bleed based on hemodynamic stability and unchanging hemoglobin (from one week ago)  - continue PPI  - keep NPO  - keep on heparin gtt for now, pending possible intervention  - consulted CRS  - consulted GI  - CBC q6h        H/O mechanical aortic valve replacement    - Patient is  asymptomatic and stable  - Stopped home meds- patient was being bridged from lovenox to coumadin, but due to bleeding the patient was placed on heparin drip for short half life            CKD (chronic kidney disease) stage 3, GFR 30-59 ml/min      Baseline creatinine 1.6-1.9  Creatinine 1.6 on 4/2        CAD (coronary atherosclerotic disease)    HYPERTENSION  HYPERLIPIDEMIA    - Patient is asymptomatic- without chest pain, SOB  - Currently not on ASA or plavix due to recurrent nosebleeds  - Maintain HTN and HLD medications  - Continue to monitor            Type 2 diabetes mellitus with stage 3 chronic kidney disease, without long-term current use of insulin    A1c 5.4 on recent admission, not on any antihyperglycemics  - monitor with daily CMP              Renovascular hypertension    - Patient is asymptomatic and stable- elevated BP upon admission 203/84, down to 160's/70s- patient did not have his evening doses of BP medication  - Treating with home meds- coreg, Imdur, nifedipine  - holding lasix given loose stools            Hyperlipidemia    - Patient is asymptomatic and stable  - Treating with home meds- lipitor and fenofibrate  - Continue to monitor          Long term current use of anticoagulant therapy    - see above            VTE Risk Mitigation         Ordered     heparin 25,000 units in dextrose 5% 250 mL (100 units/mL) infusion  Continuous     Route:  Intravenous        04/02/18 1312     heparin 25,000 units in dextrose 5% 250 mL (100 units/mL) bolus from bag; ADDITIONAL PRN BOLUS  As needed (PRN)     Route:  Intravenous        04/02/18 1034     heparin 25,000 units in dextrose 5% 250 mL (100 units/mL) bolus from bag; ADDITIONAL PRN BOLUS  As needed (PRN)     Route:  Intravenous        04/02/18 1034              Liz Mata DO  Department of Hospital Medicine   [facility]

## 2018-04-02 NOTE — SUBJECTIVE & OBJECTIVE
Past Medical History:   Diagnosis Date    Angina, class III 9/17/2014    Bilateral carotid artery disease 2/9/2017    CAD (coronary atherosclerotic disease) 9/11/2012    Cataract     Chronic kidney disease     Claudication of left lower extremity 9/17/2014    DM (diabetes mellitus) 9/26/2012    History of gout 9/26/2012    Hyperlipidemia     Hypertension     Mechanical heart valve present     NSTEMI (non-ST elevated myocardial infarction) 3/21/2018       Past Surgical History:   Procedure Laterality Date    CARDIAC CATHETERIZATION      CARDIAC VALVE REPLACEMENT      CARDIAC VALVE SURGERY      COLON SURGERY      COLONOSCOPY N/A 3/31/2017    Procedure: COLONOSCOPY;  Surgeon: Bruno Raymond MD;  Location: [facility] ([address] [place] [address]);  Service: Endoscopy;  Laterality: N/A;  Patient's wife requesting date.    CORONARY ANGIOPLASTY      CORONARY ARTERY BYPASS GRAFT      HERNIA REPAIR      SPINE SURGERY      VASECTOMY         Review of patient's allergies indicates:   Allergen Reactions    Fosinopril      Intolerance- elevates potassium level      Losartan      Intolerance- elevates potassium level       No current facility-administered medications on file prior to encounter.      Current Outpatient Prescriptions on File Prior to Encounter   Medication Sig    allopurinol (ZYLOPRIM) 100 MG tablet TAKE 1 TABLET ONE TIME DAILY    atorvastatin (LIPITOR) 80 MG tablet TAKE 1 TABLET ONE TIME DAILY    carvedilol (COREG) 6.25 MG tablet Take 2 tablets (12.5 mg total) by mouth 2 (two) times daily.    clotrimazole-betamethasone 1-0.05% (LOTRISONE) cream Apply topically once daily. in between the 4th and 5th toes left foot.    enoxaparin (LOVENOX) 120 mg/0.8 mL Syrg Inject 0.8 mLs (120 mg total) into the skin once daily.    fenofibrate micronized (LOFIBRA) 200 MG Cap Take 200 mg by mouth daily with breakfast.    ferrous sulfate 325 (65 FE) MG EC tablet Take 1 tablet (325 mg total) by mouth once daily.     furosemide (LASIX) 20 MG tablet TAKE 1 TABLET ONE TIME DAILY    glimepiride (AMARYL) 1 MG tablet Take 1 tablet (1 mg total) by mouth every morning.    isosorbide mononitrate (IMDUR) 120 MG 24 hr tablet Take 1 tablet (120 mg total) by mouth once daily.    NIFEdipine (PROCARDIA-XL) 30 MG (OSM) 24 hr tablet Take 2 tablets (60 mg total) by mouth once daily.    omega-3 acid ethyl esters (LOVAZA) 1 gram capsule Take 2 capsules (2 g total) by mouth 2 (two) times daily.    ranitidine (ZANTAC) 300 MG tablet Take 1 tablet (300 mg total) by mouth every evening.    warfarin (COUMADIN) 5 MG tablet Take 1 tablet (5 mg total) by mouth Daily. Current Dose (7.5 mg on Mon, Wed, Fri; 5 mg all other days)    [] metroNIDAZOLE (FLAGYL) 500 MG tablet Take 1 tablet (500 mg total) by mouth every 12 (twelve) hours.    nitroGLYCERIN (NITROSTAT) 0.4 MG SL tablet Place 1 tablet (0.4 mg total) under the tongue every 5 (five) minutes as needed. As needed for chest pain     Family History     Problem Relation (Age of Onset)    Alcohol abuse Father    Diabetes Brother    Heart disease Mother, Father, Brother, Sister    Heart failure Mother, Father, Brother    No Known Problems Sister, Maternal Grandmother, Maternal Grandfather, Paternal Grandmother, Paternal Grandfather, Maternal Aunt, Maternal Uncle, Paternal Aunt, Paternal Uncle        Social History Main Topics    Smoking status: Former Smoker     Packs/day: 1.00     Years: 20.00     Quit date: 1980    Smokeless tobacco: Never Used    Alcohol use No    Drug use: No    Sexual activity: No     Review of Systems   Constitutional: Negative for chills, fatigue, fever and unexpected weight change.   HENT: Negative for hearing loss.    Eyes: Negative for visual disturbance.   Respiratory: Negative for shortness of breath.    Cardiovascular: Negative for chest pain.   Gastrointestinal: Positive for blood in stool, constipation and diarrhea. Negative for abdominal pain, nausea  and vomiting.   Genitourinary: Negative for dysuria and hematuria.   Musculoskeletal: Negative for arthralgias.   Skin: Negative for rash.   Neurological: Negative for dizziness, seizures, weakness, light-headedness and headaches.   Hematological: Negative for adenopathy. Does not bruise/bleed easily.     Objective:     Vital Signs (Most Recent):  Temp: 98.3 °F (36.8 °C) (04/01/18 1717)  Pulse: (!) 56 (04/01/18 2131)  Resp: 18 (04/01/18 2131)  BP: (!) 173/74 (04/01/18 2117)  SpO2: 97 % (04/01/18 2131) Vital Signs (24h Range):  Temp:  [98.3 °F (36.8 °C)] 98.3 °F (36.8 °C)  Pulse:  [51-62] 56  Resp:  [15-19] 18  SpO2:  [97 %-99 %] 97 %  BP: (165-203)/(72-84) 173/74     Weight: 82.6 kg (182 lb)  Body mass index is 29.38 kg/m².    Physical Exam   Constitutional: He is oriented to person, place, and time. He appears well-developed and well-nourished. No distress.   HENT:   Head: Normocephalic and atraumatic.   Eyes: EOM are normal. Pupils are equal, round, and reactive to light.   Neck: No tracheal deviation present. No thyromegaly present.   Cardiovascular: Normal rate and regular rhythm.    Murmur (mechanical valve) heard.  Pulmonary/Chest: Effort normal and breath sounds normal. No respiratory distress. He has no wheezes. He has no rales.   Abdominal: Soft. There is no tenderness. No hernia.   Musculoskeletal: Normal range of motion. He exhibits no edema or tenderness.   Neurological: He is alert and oriented to person, place, and time. No cranial nerve deficit. He exhibits normal muscle tone.   Skin: Skin is warm. No rash noted. He is not diaphoretic.   Psychiatric: He has a normal mood and affect.   Vitals reviewed.        CRANIAL NERVES     CN III, IV, VI   Pupils are equal, round, and reactive to light.  Extraocular motions are normal.        Significant Labs:   CBC:   Recent Labs  Lab 04/01/18  1808   WBC 5.47   HGB 10.2*   HCT 30.9*        CMP:   Recent Labs  Lab 04/01/18  1808      K 5.0       CO2 22*      BUN 50*   CREATININE 1.9*   CALCIUM 9.5   PROT 7.6   ALBUMIN 3.6   BILITOT 0.3   ALKPHOS 69   AST 62*   ALT 55*   ANIONGAP 7*   EGFRNONAA 33.5*     Coagulation:   Recent Labs  Lab 04/01/18  1808   INR 1.6*   APTT 36.4*       Significant Imaging: I have reviewed all pertinent imaging results/findings within the past 24 hours.

## 2018-04-02 NOTE — PHYSICIAN QUERY
"PT Name: Dariel Urbina  MR #: [id number]    Physician Query Form -Present on Admission (POA) Diagnosis Clarification     CDS/: Joyce Tellez RN              Contact information:  [phone]    This form is a permanent document in the medical record.     Query Date: April 2, 2018    By submitting this query, we are merely seeking further clarification of documentation. Please utilize your independent clinical judgment when addressing the question(s) below.       The Medical record contains the following:    Diagnosis      Supporting Clinical Information   Location in Medical Record   NSTEMI         NSTEMI- POA yes     "presented for diarrhea resulting in prerenal acute kidney injury and non-anion gap metabolic acidosis. He was treated for both with IV hydration, initially with lactated ringer's to avoid superimposing hyperchloremic metabolic acidosis, however he was then noted to have hyperkalemia and thus was transitioned to normal saline. Hyperkalemia was treated with kayexelate and shifting.     During treatment of hyperkalemia, pt developed chest pain that was evaluated with troponin and EKG. Overnight, his troponinemia worsened enough to raise concern for NSTEMI. He was evaluated with PET stress test which revealed inferolateral wall ischemia. He was planned for inpatient LHC for coronary evaluation on 3/26, however this was postponed to 3/27. LHC revealed two vessel disease, which was not intervened. He was discharged home on 3/28"   3/28 DC summary           Doctor, Please specify Present On Admission (POA) status of NSTEMI .    [  ] Present on Admission   [x  ] Not Present on Admission  [  ] Clinically undetermined            "

## 2018-04-02 NOTE — CONSULTS
[facility]  Gastroenterology  Consult Note    Patient Name: Dariel Urbina  MRN: [id number]  Admission Date: 4/1/2018  Hospital Length of Stay: 0 days  Code Status: Full Code   Attending Provider: Abdi Watson MD   Consulting Provider: Jorge Steel MD  Primary Care Physician: Devon Langston MD  Principal Problem:Gastrointestinal hemorrhage    Inpatient consult to Gastroenterology  Consult performed by: JORGE STEEL  Consult ordered by: TOYA RUIZ        Subjective:     HPI:  This is a 76 y/o male with hx chronic diarrhea, diveritculitis s/p left sided colon resection?, DM, HTN, CAD, mechanical AV who presented with blood in stool.  Pt recently had admission for watery nonbloody diarrhea. Discharged last Wednesday. Also recently had NSTEMI, no intervention on [facility].  After discharge, patient had 2 days of constipation and this was followed by 3 -5 loose stools on Saturday.   That progressed to pinkish red color in the toilet bowl. His wife made him come in for evaluation.  He has been taking lovenox daily as outpatient as well as coumadin. He is not on asa or plavix.    Labs show Hgb stable and at baseline. Vital and hemodynamics stable.    Had colonoscopy 1 year ago - normal. Biopsies taken for micro colitis - negative.    Past Medical History:   Diagnosis Date    Angina, class III 9/17/2014    Bilateral carotid artery disease 2/9/2017    CAD (coronary atherosclerotic disease) 9/11/2012    Cataract     Chronic kidney disease     Claudication of left lower extremity 9/17/2014    DM (diabetes mellitus) 9/26/2012    History of gout 9/26/2012    Hyperlipidemia     Hypertension     Mechanical heart valve present     NSTEMI (non-ST elevated myocardial infarction) 3/21/2018       Past Surgical History:   Procedure Laterality Date    CARDIAC CATHETERIZATION      CARDIAC VALVE REPLACEMENT      CARDIAC VALVE SURGERY      COLON SURGERY      COLONOSCOPY N/A  3/31/2017    Procedure: COLONOSCOPY;  Surgeon: Bruno Raymond MD;  Location: [facility] ([address] [place] [address]);  Service: Endoscopy;  Laterality: N/A;  Patient's wife requesting date.    CORONARY ANGIOPLASTY      CORONARY ARTERY BYPASS GRAFT      HERNIA REPAIR      SPINE SURGERY      VASECTOMY         Review of patient's allergies indicates:   Allergen Reactions    Fosinopril      Intolerance- elevates potassium level      Losartan      Intolerance- elevates potassium level     Family History     Problem Relation (Age of Onset)    Alcohol abuse Father    Diabetes Brother    Heart disease Mother, Father, Brother, Sister    Heart failure Mother, Father, Brother    No Known Problems Sister, Maternal Grandmother, Maternal Grandfather, Paternal Grandmother, Paternal Grandfather, Maternal Aunt, Maternal Uncle, Paternal Aunt, Paternal Uncle        Social History Main Topics    Smoking status: Former Smoker     Packs/day: 1.00     Years: 20.00     Quit date: 9/11/1980    Smokeless tobacco: Never Used    Alcohol use No    Drug use: No    Sexual activity: No     Review of Systems   Constitutional: Positive for fatigue. Negative for chills and fever.   HENT: Negative for facial swelling, mouth sores and trouble swallowing.    Eyes: Negative for pain and redness.   Respiratory: Negative for cough and shortness of breath.    Cardiovascular: Negative for chest pain and leg swelling.   Gastrointestinal: Positive for anal bleeding and blood in stool. Negative for abdominal pain.   Genitourinary: Negative for dysuria and hematuria.   Musculoskeletal: Negative for gait problem and neck stiffness.   Skin: Negative for rash and wound.   Neurological: Negative for seizures and headaches.   Hematological: Bruises/bleeds easily.   Psychiatric/Behavioral: Negative for confusion and hallucinations.     Objective:     Vital Signs (Most Recent):  Temp: 97.1 °F (36.2 °C) (04/02/18 1153)  Pulse: (!) 56 (04/02/18 1156)  Resp: 16 (04/02/18  1153)  BP: (!) 147/67 (04/02/18 1153)  SpO2: 96 % (04/02/18 1153) Vital Signs (24h Range):  Temp:  [97.1 °F (36.2 °C)-98.3 °F (36.8 °C)] 97.1 °F (36.2 °C)  Pulse:  [49-62] 56  Resp:  [15-19] 16  SpO2:  [93 %-99 %] 96 %  BP: (147-203)/(67-84) 147/67     Weight: 75.8 kg (167 lb 1.7 oz) (04/02/18 0000)  Body mass index is 26.97 kg/m².      Intake/Output Summary (Last 24 hours) at 04/02/18 1404  Last data filed at 04/01/18 1855   Gross per 24 hour   Intake              510 ml   Output                0 ml   Net              510 ml       Lines/Drains/Airways     Peripheral Intravenous Line                 Peripheral IV - Single Lumen 04/01/18 1808 Left Antecubital less than 1 day                Physical Exam   Constitutional: He is oriented to person, place, and time. He appears well-developed and well-nourished. No distress.   HENT:   Head: Normocephalic and atraumatic.   Eyes: Conjunctivae and EOM are normal.   Neck: Neck supple. No thyromegaly present.   Cardiovascular: Normal rate, regular rhythm and intact distal pulses.    Mechanical valve click   Pulmonary/Chest: Effort normal. No respiratory distress.   Abdominal: Soft. He exhibits no distension. There is no tenderness.   Genitourinary:   Genitourinary Comments: Rectal: no external lesions ; red blood in vault ; toilet with liquid red blood mixed with stool.   Musculoskeletal: Normal range of motion. He exhibits no tenderness.   Neurological: He is alert and oriented to person, place, and time. No cranial nerve deficit.   Skin: Skin is warm and dry. No rash noted.   Psychiatric: He has a normal mood and affect. His behavior is normal.   Vitals reviewed.      Significant Labs:  Acute Hepatitis Panel: No results for input(s): HEPBSAG, HEPAIGM, HEPCAB in the last 48 hours.    Invalid input(s): HAPBIGM  Amylase: No results for input(s): AMYLASE in the last 48 hours.  Blood Culture: No results for input(s): LABBLOO in the last 48 hours.  CBC:   Recent Labs  Lab  04/01/18  2308 04/02/18  0408 04/02/18  1241   WBC 5.86 5.42 5.92   HGB 10.2* 10.4* 10.2*   HCT 31.0* 32.2* 30.7*    187 175     BMP:   Recent Labs  Lab 04/01/18  1808 04/02/18  0407    61*    140   K 5.0 5.4*    111*   CO2 22* 22*   BUN 50* 42*   CREATININE 1.9* 1.6*   CALCIUM 9.5 9.8   MG 2.0  --      CMP:   Recent Labs  Lab 04/01/18  1808 04/02/18  0407    61*   CALCIUM 9.5 9.8   ALBUMIN 3.6  --    PROT 7.6  --     140   K 5.0 5.4*   CO2 22* 22*    111*   BUN 50* 42*   CREATININE 1.9* 1.6*   ALKPHOS 69  --    ALT 55*  --    AST 62*  --    BILITOT 0.3  --      Coagulation:   Recent Labs  Lab 04/02/18  0407   INR 1.7*   APTT 85.2*     CRP: No results for input(s): CRP in the last 48 hours.  ESR: No results for input(s): SEDRATE in the last 48 hours.  H.Pylori Ab IgG: No results for input(s): HPYLORIIGG in the last 48 hours.  Lipase:   Recent Labs  Lab 04/01/18  1808   LIPASE 82*     Liver Function Test:   Recent Labs  Lab 04/01/18  1808   ALT 55*   AST 62*   ALKPHOS 69   BILITOT 0.3   PROT 7.6   ALBUMIN 3.6     Peritoneal Fluid Cultures: No results for input(s): AEROBICCULTU, LABGRAM in the last 48 hours.  Stool C. diff: No results for input(s): CDIFFICILEAN, CDIFFTOX in the last 48 hours.  Stool Culture: No results for input(s): STOOLCULTURE in the last 48 hours.  Stool Ova/Cysts/Parasites: No results for input(s): STLEXAMOCP in the last 48 hours.  Stool Giardia/Crypto: No results for input(s): GIARDIAANTIG, CRSPAG in the last 48 hours.  Stool WBCs: No results for input(s): STOOLWBC in the last 48 hours.  Stool Lactoferrin: No results for input(s): LACTOFERRINS in the last 48 hours.    Significant Imaging:  Imaging results within the past 24 hours have been reviewed.    Assessment/Plan:     * Gastrointestinal hemorrhage    Currently HDS and Hgb is at baseline.  Suspect possible hemorrhoids vs. Diverticular source.  Given need for continued anticoagulation, will proceed  with full colonoscopy to further eval.    Recs:  Colonoscopy tomorrow  Clear liquid diet today and NPO after midnight  Bedside commode and Tuck's wipes to bedside  Golytely 1 gallon:   1/2 gallon between 6-8pm (8oz q15min)   1/2 gallon between 4-6am (8oz until complete)  Trend CBC BID for now  Continue ppi po daily  Continue heparin gtt, would hold1 hour prior to time of procedure.                Thank you for your consult. I will follow-up with patient. Please contact us if you have any additional questions.    David Steel MD  Gastroenterology  [facility]

## 2018-04-02 NOTE — SUBJECTIVE & OBJECTIVE
Past Medical History:   Diagnosis Date    Angina, class III 9/17/2014    Bilateral carotid artery disease 2/9/2017    CAD (coronary atherosclerotic disease) 9/11/2012    Cataract     Chronic kidney disease     Claudication of left lower extremity 9/17/2014    DM (diabetes mellitus) 9/26/2012    History of gout 9/26/2012    Hyperlipidemia     Hypertension     Mechanical heart valve present     NSTEMI (non-ST elevated myocardial infarction) 3/21/2018       Past Surgical History:   Procedure Laterality Date    CARDIAC CATHETERIZATION      CARDIAC VALVE REPLACEMENT      CARDIAC VALVE SURGERY      COLON SURGERY      COLONOSCOPY N/A 3/31/2017    Procedure: COLONOSCOPY;  Surgeon: Bruno Raymond MD;  Location: [facility] ([address] [place] [address]);  Service: Endoscopy;  Laterality: N/A;  Patient's wife requesting date.    CORONARY ANGIOPLASTY      CORONARY ARTERY BYPASS GRAFT      HERNIA REPAIR      SPINE SURGERY      VASECTOMY         Review of patient's allergies indicates:   Allergen Reactions    Fosinopril      Intolerance- elevates potassium level      Losartan      Intolerance- elevates potassium level     Family History     Problem Relation (Age of Onset)    Alcohol abuse Father    Diabetes Brother    Heart disease Mother, Father, Brother, Sister    Heart failure Mother, Father, Brother    No Known Problems Sister, Maternal Grandmother, Maternal Grandfather, Paternal Grandmother, Paternal Grandfather, Maternal Aunt, Maternal Uncle, Paternal Aunt, Paternal Uncle        Social History Main Topics    Smoking status: Former Smoker     Packs/day: 1.00     Years: 20.00     Quit date: 9/11/1980    Smokeless tobacco: Never Used    Alcohol use No    Drug use: No    Sexual activity: No     Review of Systems   Constitutional: Positive for fatigue. Negative for chills and fever.   HENT: Negative for facial swelling, mouth sores and trouble swallowing.    Eyes: Negative for pain and redness.   Respiratory: Negative  for cough and shortness of breath.    Cardiovascular: Negative for chest pain and leg swelling.   Gastrointestinal: Positive for anal bleeding and blood in stool. Negative for abdominal pain.   Genitourinary: Negative for dysuria and hematuria.   Musculoskeletal: Negative for gait problem and neck stiffness.   Skin: Negative for rash and wound.   Neurological: Negative for seizures and headaches.   Hematological: Bruises/bleeds easily.   Psychiatric/Behavioral: Negative for confusion and hallucinations.     Objective:     Vital Signs (Most Recent):  Temp: 97.1 °F (36.2 °C) (04/02/18 1153)  Pulse: (!) 56 (04/02/18 1156)  Resp: 16 (04/02/18 1153)  BP: (!) 147/67 (04/02/18 1153)  SpO2: 96 % (04/02/18 1153) Vital Signs (24h Range):  Temp:  [97.1 °F (36.2 °C)-98.3 °F (36.8 °C)] 97.1 °F (36.2 °C)  Pulse:  [49-62] 56  Resp:  [15-19] 16  SpO2:  [93 %-99 %] 96 %  BP: (147-203)/(67-84) 147/67     Weight: 75.8 kg (167 lb 1.7 oz) (04/02/18 0000)  Body mass index is 26.97 kg/m².      Intake/Output Summary (Last 24 hours) at 04/02/18 1404  Last data filed at 04/01/18 1855   Gross per 24 hour   Intake              510 ml   Output                0 ml   Net              510 ml       Lines/Drains/Airways     Peripheral Intravenous Line                 Peripheral IV - Single Lumen 04/01/18 1808 Left Antecubital less than 1 day                Physical Exam   Constitutional: He is oriented to person, place, and time. He appears well-developed and well-nourished. No distress.   HENT:   Head: Normocephalic and atraumatic.   Eyes: Conjunctivae and EOM are normal.   Neck: Neck supple. No thyromegaly present.   Cardiovascular: Normal rate, regular rhythm and intact distal pulses.    Mechanical valve click   Pulmonary/Chest: Effort normal. No respiratory distress.   Abdominal: Soft. He exhibits no distension. There is no tenderness.   Genitourinary:   Genitourinary Comments: Rectal: no external lesions ; red blood in vault ; toilet with  liquid red blood mixed with stool.   Musculoskeletal: Normal range of motion. He exhibits no tenderness.   Neurological: He is alert and oriented to person, place, and time. No cranial nerve deficit.   Skin: Skin is warm and dry. No rash noted.   Psychiatric: He has a normal mood and affect. His behavior is normal.   Vitals reviewed.      Significant Labs:  Acute Hepatitis Panel: No results for input(s): HEPBSAG, HEPAIGM, HEPCAB in the last 48 hours.    Invalid input(s): HAPBIGM  Amylase: No results for input(s): AMYLASE in the last 48 hours.  Blood Culture: No results for input(s): LABBLOO in the last 48 hours.  CBC:   Recent Labs  Lab 04/01/18  2308 04/02/18  0408 04/02/18  1241   WBC 5.86 5.42 5.92   HGB 10.2* 10.4* 10.2*   HCT 31.0* 32.2* 30.7*    187 175     BMP:   Recent Labs  Lab 04/01/18 1808 04/02/18  0407    61*    140   K 5.0 5.4*    111*   CO2 22* 22*   BUN 50* 42*   CREATININE 1.9* 1.6*   CALCIUM 9.5 9.8   MG 2.0  --      CMP:   Recent Labs  Lab 04/01/18 1808 04/02/18  0407    61*   CALCIUM 9.5 9.8   ALBUMIN 3.6  --    PROT 7.6  --     140   K 5.0 5.4*   CO2 22* 22*    111*   BUN 50* 42*   CREATININE 1.9* 1.6*   ALKPHOS 69  --    ALT 55*  --    AST 62*  --    BILITOT 0.3  --      Coagulation:   Recent Labs  Lab 04/02/18  0407   INR 1.7*   APTT 85.2*     CRP: No results for input(s): CRP in the last 48 hours.  ESR: No results for input(s): SEDRATE in the last 48 hours.  H.Pylori Ab IgG: No results for input(s): HPYLORIIGG in the last 48 hours.  Lipase:   Recent Labs  Lab 04/01/18 1808   LIPASE 82*     Liver Function Test:   Recent Labs  Lab 04/01/18 1808   ALT 55*   AST 62*   ALKPHOS 69   BILITOT 0.3   PROT 7.6   ALBUMIN 3.6     Peritoneal Fluid Cultures: No results for input(s): AEROBICCULTU, LABGRAM in the last 48 hours.  Stool C. diff: No results for input(s): CDIFFICILEAN, CDIFFTOX in the last 48 hours.  Stool Culture: No results for input(s):  STOOLCULTURE in the last 48 hours.  Stool Ova/Cysts/Parasites: No results for input(s): STLEXAMOCP in the last 48 hours.  Stool Giardia/Crypto: No results for input(s): GIARDIAANTIG, CRSPAG in the last 48 hours.  Stool WBCs: No results for input(s): STOOLWBC in the last 48 hours.  Stool Lactoferrin: No results for input(s): LACTOFERRINS in the last 48 hours.    Significant Imaging:  Imaging results within the past 24 hours have been reviewed.

## 2018-04-02 NOTE — ASSESSMENT & PLAN NOTE
- Patient is asymptomatic and stable- elevated BP upon admission 203/84, down to 160's/70s- patient did not have his evening doses of BP medication  - Treating with home meds- coreg, Imdur, nifedipine  - Hold lasix for now as patient appears volume down with diarrhea and JIM, re-institute once JIM resolves  - Continue to monitor

## 2018-04-02 NOTE — ASSESSMENT & PLAN NOTE
Currently HDS and Hgb is at baseline.  Suspect possible hemorrhoids vs. Diverticular source.  Given need for continued anticoagulation, will proceed with full colonoscopy to further eval.    Recs:  Colonoscopy tomorrow  Clear liquid diet today and NPO after midnight  Bedside commode and Justinck's wipes to bedside  Golytely 1 gallon:   1/2 gallon between 6-8pm (8oz q15min)   1/2 gallon between 4-6am (8oz until complete)  Trend CBC BID for now  Continue ppi po daily  Continue heparin gtt, would hold1 hour prior to time of procedure.

## 2018-04-02 NOTE — ANESTHESIA PREPROCEDURE EVALUATION
04/02/2018  Pre-operative evaluation for Procedure(s) (LRB):  COLONOSCOPY (N/A)    Dariel Urbina is a 77 y.o. male with PMH has PMH for HTN, CKD III, HLD, CAD, DMII, s/p mechanical aortic valve placement, diastolic heart failure, diverticulosis (s/p left-sided colectomy), chronic diarrhea presenting with a 1 day history of BRBPR. GI was consulted to work-up a possible GIB.    Labs show Hgb stable and at baseline. Vital and hemodynamics stable.    LDA: L AC 18g    Prev airway: None on file    Drips: Heparin    Patient Active Problem List   Diagnosis    PVD (peripheral vascular disease)    CAD (coronary atherosclerotic disease)    Long term current use of anticoagulant therapy    Hyperlipidemia    History of colon resection    Renovascular hypertension    History of gout    Type 2 diabetes mellitus with stage 3 chronic kidney disease, without long-term current use of insulin    H/O mechanical aortic valve replacement    CKD (chronic kidney disease) stage 3, GFR 30-59 ml/min    Anemia of chronic renal failure, stage 3 (moderate)    Aneurysm artery, iliac    Aneurysm of aorta    Left ventricular diastolic dysfunction with preserved systolic function    Type 2 diabetes mellitus with diabetic peripheral angiopathy without gangrene    Hyperparathyroidism due to renal insufficiency    Obesity, diabetes, and hypertension syndrome    Bilateral carotid artery disease    Pulmonary heart disease    Gastroesophageal reflux disease without esophagitis    Metabolic acidosis with normal anion gap and bicarbonate losses    NSTEMI (non-ST elevated myocardial infarction)    Bleeding nose    Gastrointestinal hemorrhage       Review of patient's allergies indicates:   Allergen Reactions    Fosinopril      Intolerance- elevates potassium level      Losartan      Intolerance- elevates potassium level         No current facility-administered medications on file prior to encounter.      Current Outpatient Prescriptions on File Prior to Encounter   Medication Sig Dispense Refill    allopurinol (ZYLOPRIM) 100 MG tablet TAKE 1 TABLET ONE TIME DAILY 90 tablet 4    atorvastatin (LIPITOR) 80 MG tablet TAKE 1 TABLET ONE TIME DAILY 90 tablet 3    carvedilol (COREG) 6.25 MG tablet Take 2 tablets (12.5 mg total) by mouth 2 (two) times daily. 60 tablet 3    clotrimazole-betamethasone 1-0.05% (LOTRISONE) cream Apply topically once daily. in between the 4th and 5th toes left foot. 45 g 1    enoxaparin (LOVENOX) 120 mg/0.8 mL Syrg Inject 0.8 mLs (120 mg total) into the skin once daily. 4.8 mL 0    fenofibrate micronized (LOFIBRA) 200 MG Cap Take 200 mg by mouth daily with breakfast.      ferrous sulfate 325 (65 FE) MG EC tablet Take 1 tablet (325 mg total) by mouth once daily. 90 tablet 4    furosemide (LASIX) 20 MG tablet TAKE 1 TABLET ONE TIME DAILY 90 tablet 4    glimepiride (AMARYL) 1 MG tablet Take 1 tablet (1 mg total) by mouth every morning. 90 tablet 2    isosorbide mononitrate (IMDUR) 120 MG 24 hr tablet Take 1 tablet (120 mg total) by mouth once daily. 90 tablet 3    NIFEdipine (PROCARDIA-XL) 30 MG (OSM) 24 hr tablet Take 2 tablets (60 mg total) by mouth once daily. 60 tablet 4    nitroGLYCERIN (NITROSTAT) 0.4 MG SL tablet Place 1 tablet (0.4 mg total) under the tongue every 5 (five) minutes as needed. As needed for chest pain 90 tablet 4    omega-3 acid ethyl esters (LOVAZA) 1 gram capsule Take 2 capsules (2 g total) by mouth 2 (two) times daily. 360 capsule 5    ranitidine (ZANTAC) 300 MG tablet Take 1 tablet (300 mg total) by mouth every evening. 90 tablet 3    warfarin (COUMADIN) 5 MG tablet Take 1 tablet (5 mg total) by mouth Daily. Current Dose (7.5 mg on Mon, Wed, Fri; 5 mg all other days) 120 tablet 3       Past Surgical History:   Procedure Laterality Date    CARDIAC CATHETERIZATION       CARDIAC VALVE REPLACEMENT      CARDIAC VALVE SURGERY      COLON SURGERY      COLONOSCOPY N/A 3/31/2017    Procedure: COLONOSCOPY;  Surgeon: Bruno Raymond MD;  Location: [facility] ([address] [place] [address]);  Service: Endoscopy;  Laterality: N/A;  Patient's wife requesting date.    CORONARY ANGIOPLASTY      CORONARY ARTERY BYPASS GRAFT      HERNIA REPAIR      SPINE SURGERY      VASECTOMY         Social History     Social History    Marital status:      Spouse name: Ameena    Number of children: 3    Years of education: N/A     Occupational History    retired      Social History Main Topics    Smoking status: Former Smoker     Packs/day: 1.00     Years: 20.00     Quit date: 9/11/1980    Smokeless tobacco: Never Used    Alcohol use No    Drug use: No    Sexual activity: No     Other Topics Concern    Not on file     Social History Narrative    No narrative on file         Vital Signs Range (Last 24H):  Temp:  [36.2 °C (97.1 °F)-36.8 °C (98.3 °F)]   Pulse:  [49-62]   Resp:  [15-19]   BP: (147-203)/(67-84)   SpO2:  [93 %-99 %]       CBC:   Recent Labs      04/02/18   0408  04/02/18   1241   WBC  5.42  5.92   RBC  3.51*  3.40*   HGB  10.4*  10.2*   HCT  32.2*  30.7*   PLT  187  175   MCV  92  90   MCH  29.6  30.0   MCHC  32.3  33.2       CMP:   Recent Labs      04/01/18   1808  04/02/18   0407   NA  139  140   K  5.0  5.4*   CL  110  111*   CO2  22*  22*   BUN  50*  42*   CREATININE  1.9*  1.6*   GLU  103  61*   MG  2.0   --    CALCIUM  9.5  9.8   ALBUMIN  3.6   --    PROT  7.6   --    ALKPHOS  69   --    ALT  55*   --    AST  62*   --    BILITOT  0.3   --        INR  Recent Labs      04/01/18   1808  04/02/18   0407   INR  1.6*  1.7*   APTT  36.4*  85.2*       Diagnostic Studies:      EKG:  Sinus bradycardia with 1st degree A-V block with occasional Premature  ventricular complexes  Left atrial abnormality/enlargement  Left bundle branch block  Abnormal ECG  When compared with ECG of 28-MAR-2018 07:59,  T  wave inversion more evident in Inferior leads  Confirmed by MARK CONLEY MD ([id number]) on 4/2/2018 11:19:52 AM    2D Echo:  CONCLUSIONS: ABNORMAL MYOCARDIAL PERFUSION PET STRESS TEST  1. There is a moderate sized mild to moderate ischemia in the base to distal inferolateral wall. This defect comprises 15 % of the left ventricular myocardium.   2. Resting wall motion is physiologic. Stress wall motion is physiologic.   3. LV function is normal at rest and stress.  (normal is >= 51%)  4. The ventricular volumes are normal at rest and stress.   5. The extracardiac distribution of radioactivity is normal.   6. When compared to the previous study from 12/8/16, there is no signficant change.    Anesthesia Evaluation    I have reviewed the Patient Summary Reports.    I have reviewed the Nursing Notes.   I have reviewed the Medications.     Review of Systems  Anesthesia Hx:  No problems with previous Anesthesia  History of prior surgery of interest to airway management or planning: Denies Family Hx of Anesthesia complications.    Social:  Former Smoker, No Alcohol Use    Cardiovascular:   Exercise tolerance: poor Hypertension CAD  CABG/stent  Angina, with exertion LÓPEZ S/p AVR   Pulmonary:   Denies Asthma.  Denies Sleep Apnea.    Renal/:   Chronic Renal Disease, CRI    Hepatic/GI:   Denies GERD.    Endocrine:   Diabetes        Physical Exam  General:  Well nourished    Airway/Jaw/Neck:  Airway Findings: Mouth Opening: Normal Tongue: Normal  General Airway Assessment: Adult  Mallampati: II  TM Distance: Normal, at least 6 cm  Jaw/Neck Findings:  Neck ROM: Normal ROM      Dental:  Dental Findings: In tact, Periodontal disease, Mild    Chest/Lungs:  Chest/Lungs Findings: Clear to auscultation, Normal Respiratory Rate     Heart/Vascular:  Heart Findings: Rate: Normal  Rhythm: Regular Rhythm        Mental Status:  Mental Status Findings:  Cooperative, Alert and Oriented         Anesthesia Plan  Type of Anesthesia, risks & benefits  discussed:  Anesthesia Type:  MAC, general  Patient's Preference:   Intra-op Monitoring Plan: standard ASA monitors  Intra-op Monitoring Plan Comments:   Post Op Pain Control Plan: per primary service following discharge from PACU and IV/PO Opioids PRN  Post Op Pain Control Plan Comments:   Induction:   IV  Beta Blocker:  Patient is on a Beta-Blocker and has received one dose within the past 24 hours (No further documentation required).       Informed Consent: Patient understands risks and agrees with Anesthesia plan.  Questions answered. Anesthesia consent signed with patient.  ASA Score: 3     Day of Surgery Review of History & Physical:    H&P update referred to the provider.         Ready For Surgery From Anesthesia Perspective.

## 2018-04-02 NOTE — SUBJECTIVE & OBJECTIVE
Interval History: Patient had one episode of loose stool this morning which was maroon-colored. Patient denies any lightheadedness, chest pain, or shortness of breath. No abdominal pain or n/v.     Review of Systems   Constitutional: Negative for chills and fever.   Respiratory: Negative for cough, shortness of breath and wheezing.    Cardiovascular: Negative for chest pain and palpitations.   Gastrointestinal: Positive for blood in stool and diarrhea. Negative for abdominal pain, constipation, nausea and vomiting.   Genitourinary: Negative for dysuria and hematuria.   Musculoskeletal: Negative for back pain and neck pain.   Skin: Negative for color change and rash.   Neurological: Negative for light-headedness and headaches.     Objective:     Vital Signs (Most Recent):  Temp: 97.1 °F (36.2 °C) (04/02/18 1153)  Pulse: (!) 56 (04/02/18 1156)  Resp: 16 (04/02/18 1153)  BP: (!) 147/67 (04/02/18 1153)  SpO2: 96 % (04/02/18 1153) Vital Signs (24h Range):  Temp:  [97.1 °F (36.2 °C)-98.3 °F (36.8 °C)] 97.1 °F (36.2 °C)  Pulse:  [49-62] 56  Resp:  [15-19] 16  SpO2:  [93 %-99 %] 96 %  BP: (147-203)/(67-84) 147/67     Weight: 75.8 kg (167 lb 1.7 oz)  Body mass index is 26.97 kg/m².    Intake/Output Summary (Last 24 hours) at 04/02/18 1437  Last data filed at 04/01/18 1855   Gross per 24 hour   Intake              510 ml   Output                0 ml   Net              510 ml      Physical Exam   Constitutional: He is oriented to person, place, and time. He appears well-developed and well-nourished. No distress.   HENT:   Head: Normocephalic and atraumatic.   Mouth/Throat: Oropharynx is clear and moist.   Eyes: Conjunctivae and EOM are normal. No scleral icterus.   Neck: Normal range of motion. Neck supple.   Cardiovascular: Normal rate and regular rhythm.    Audible click   Pulmonary/Chest: Effort normal and breath sounds normal. No respiratory distress. He has no wheezes. He has no rales.   Abdominal: Soft. Bowel sounds are  normal. He exhibits no distension. There is no tenderness. There is no guarding.   FOBT performed on maroon stool (not RADHA) was guiac positive   Musculoskeletal: Normal range of motion. He exhibits no edema.   Neurological: He is alert and oriented to person, place, and time.   Skin: Skin is warm and dry. No rash noted. He is not diaphoretic.   Psychiatric: He has a normal mood and affect. His behavior is normal.       Significant Labs:   CBC:   Recent Labs  Lab 04/01/18  2308 04/02/18  0408 04/02/18  1241   WBC 5.86 5.42 5.92   HGB 10.2* 10.4* 10.2*   HCT 31.0* 32.2* 30.7*    187 175     CMP:   Recent Labs  Lab 04/01/18  1808 04/02/18  0407    140   K 5.0 5.4*    111*   CO2 22* 22*    61*   BUN 50* 42*   CREATININE 1.9* 1.6*   CALCIUM 9.5 9.8   PROT 7.6  --    ALBUMIN 3.6  --    BILITOT 0.3  --    ALKPHOS 69  --    AST 62*  --    ALT 55*  --    ANIONGAP 7* 7*   EGFRNONAA 33.5* 41.2*       Significant Imaging: I have reviewed all pertinent imaging results/findings within the past 24 hours.

## 2018-04-02 NOTE — PLAN OF CARE
04/02/18 1042   Discharge Assessment   Assessment Type Discharge Planning Assessment   Confirmed/corrected address and phone number on facesheet? Yes   Assessment information obtained from? Patient;Medical Record;Caregiver  (wife at bedside)   Expected Length of Stay (days) 1   Communicated expected length of stay with patient/caregiver yes   Prior to hospitilization cognitive status: Alert/Oriented   Prior to hospitalization functional status: Independent   Current cognitive status: Alert/Oriented   Current Functional Status: Independent   Lives With spouse   Able to Return to Prior Arrangements yes   Is patient able to care for self after discharge? Yes   Who are your caregiver(s) and their phone number(s)? self care   Patient's perception of discharge disposition home or selfcare   Readmission Within The Last 30 Days no previous admission in last 30 days   Patient currently being followed by outpatient case management? No   Patient currently receives any other outside agency services? Yes   Equipment Currently Used at Home none   Do you have any problems affording any of your prescribed medications? No   Is the patient taking medications as prescribed? yes   Does the patient have transportation home? Yes   Transportation Available family or friend will provide   Does the patient receive services at the [facility]? No   Discharge Plan A Home with family   Discharge Plan B Home Health   Patient/Family In Agreement With Plan yes

## 2018-04-03 ENCOUNTER — SURGERY (OUTPATIENT)
Age: 78
End: 2018-04-03

## 2018-04-03 ENCOUNTER — ANESTHESIA (OUTPATIENT)
Dept: ENDOSCOPY | Facility: HOSPITAL | Age: 78
End: 2018-04-03
Payer: MEDICARE

## 2018-04-03 DIAGNOSIS — K63.5 POLYP OF COLON, UNSPECIFIED PART OF COLON, UNSPECIFIED TYPE: Primary | ICD-10-CM

## 2018-04-03 PROBLEM — K57.91 DIVERTICULOSIS OF INTESTINE WITH BLEEDING: Status: ACTIVE | Noted: 2018-04-03

## 2018-04-03 PROBLEM — K64.9 HEMORRHOID: Status: ACTIVE | Noted: 2018-04-03

## 2018-04-03 LAB
ANION GAP SERPL CALC-SCNC: 9 MMOL/L
APTT BLDCRRT: >150 SEC
BASOPHILS # BLD AUTO: 0.03 K/UL
BASOPHILS # BLD AUTO: 0.04 K/UL
BASOPHILS NFR BLD: 0.6 %
BASOPHILS NFR BLD: 0.9 %
BUN SERPL-MCNC: 32 MG/DL
CALCIUM SERPL-MCNC: 9.7 MG/DL
CHLORIDE SERPL-SCNC: 109 MMOL/L
CO2 SERPL-SCNC: 21 MMOL/L
CREAT SERPL-MCNC: 1.4 MG/DL
DIFFERENTIAL METHOD: ABNORMAL
DIFFERENTIAL METHOD: ABNORMAL
EOSINOPHIL # BLD AUTO: 0.1 K/UL
EOSINOPHIL # BLD AUTO: 0.2 K/UL
EOSINOPHIL NFR BLD: 2.2 %
EOSINOPHIL NFR BLD: 2.8 %
ERYTHROCYTE [DISTWIDTH] IN BLOOD BY AUTOMATED COUNT: 13.8 %
ERYTHROCYTE [DISTWIDTH] IN BLOOD BY AUTOMATED COUNT: 13.9 %
EST. GFR  (AFRICAN AMERICAN): 56 ML/MIN/1.73 M^2
EST. GFR  (NON AFRICAN AMERICAN): 48.5 ML/MIN/1.73 M^2
FACT X PPP CHRO-ACNC: 1.42 IU/ML
FACT X PPP CHRO-ACNC: 1.71 IU/ML
FACT X PPP CHRO-ACNC: >1.86 IU/ML
GLUCOSE SERPL-MCNC: 86 MG/DL
HCT VFR BLD AUTO: 31.2 %
HCT VFR BLD AUTO: 34 %
HGB BLD-MCNC: 10.1 G/DL
HGB BLD-MCNC: 11 G/DL
IMM GRANULOCYTES # BLD AUTO: 0.01 K/UL
IMM GRANULOCYTES # BLD AUTO: 0.01 K/UL
IMM GRANULOCYTES NFR BLD AUTO: 0.2 %
IMM GRANULOCYTES NFR BLD AUTO: 0.2 %
INR PPP: 1.5
LYMPHOCYTES # BLD AUTO: 1.8 K/UL
LYMPHOCYTES # BLD AUTO: 1.9 K/UL
LYMPHOCYTES NFR BLD: 33.4 %
LYMPHOCYTES NFR BLD: 42.8 %
MCH RBC QN AUTO: 29.9 PG
MCH RBC QN AUTO: 30.3 PG
MCHC RBC AUTO-ENTMCNC: 32.4 G/DL
MCHC RBC AUTO-ENTMCNC: 32.4 G/DL
MCV RBC AUTO: 92 FL
MCV RBC AUTO: 94 FL
MONOCYTES # BLD AUTO: 0.5 K/UL
MONOCYTES # BLD AUTO: 0.5 K/UL
MONOCYTES NFR BLD: 11.9 %
MONOCYTES NFR BLD: 9.6 %
NEUTROPHILS # BLD AUTO: 1.9 K/UL
NEUTROPHILS # BLD AUTO: 2.9 K/UL
NEUTROPHILS NFR BLD: 42 %
NEUTROPHILS NFR BLD: 53.4 %
NRBC BLD-RTO: 0 /100 WBC
NRBC BLD-RTO: 0 /100 WBC
PLATELET # BLD AUTO: 169 K/UL
PLATELET # BLD AUTO: 190 K/UL
PMV BLD AUTO: 10.8 FL
PMV BLD AUTO: 10.8 FL
POCT GLUCOSE: 87 MG/DL (ref 70–110)
POCT GLUCOSE: 88 MG/DL (ref 70–110)
POCT GLUCOSE: 94 MG/DL (ref 70–110)
POTASSIUM SERPL-SCNC: 4.5 MMOL/L
PROTHROMBIN TIME: 15.2 SEC
RBC # BLD AUTO: 3.38 M/UL
RBC # BLD AUTO: 3.63 M/UL
SODIUM SERPL-SCNC: 139 MMOL/L
WBC # BLD AUTO: 4.53 K/UL
WBC # BLD AUTO: 5.42 K/UL

## 2018-04-03 PROCEDURE — 25000003 PHARM REV CODE 250: Performed by: STUDENT IN AN ORGANIZED HEALTH CARE EDUCATION/TRAINING PROGRAM

## 2018-04-03 PROCEDURE — 36415 COLL VENOUS BLD VENIPUNCTURE: CPT

## 2018-04-03 PROCEDURE — G0378 HOSPITAL OBSERVATION PER HR: HCPCS

## 2018-04-03 PROCEDURE — 82962 GLUCOSE BLOOD TEST: CPT | Performed by: INTERNAL MEDICINE

## 2018-04-03 PROCEDURE — 80048 BASIC METABOLIC PNL TOTAL CA: CPT

## 2018-04-03 PROCEDURE — 99225 PR SUBSEQUENT OBSERVATION CARE,LEVEL II: CPT | Mod: GC,,, | Performed by: HOSPITALIST

## 2018-04-03 PROCEDURE — 45382 COLONOSCOPY W/CONTROL BLEED: CPT | Mod: ,,, | Performed by: INTERNAL MEDICINE

## 2018-04-03 PROCEDURE — 63600175 PHARM REV CODE 636 W HCPCS: Performed by: NURSE ANESTHETIST, CERTIFIED REGISTERED

## 2018-04-03 PROCEDURE — 37000008 HC ANESTHESIA 1ST 15 MINUTES: Performed by: INTERNAL MEDICINE

## 2018-04-03 PROCEDURE — 27200997: Performed by: INTERNAL MEDICINE

## 2018-04-03 PROCEDURE — 25000003 PHARM REV CODE 250: Performed by: NURSE ANESTHETIST, CERTIFIED REGISTERED

## 2018-04-03 PROCEDURE — D9220A PRA ANESTHESIA: Mod: ANES,,, | Performed by: ANESTHESIOLOGY

## 2018-04-03 PROCEDURE — D9220A PRA ANESTHESIA: Mod: CRNA,,, | Performed by: NURSE ANESTHETIST, CERTIFIED REGISTERED

## 2018-04-03 PROCEDURE — 63600175 PHARM REV CODE 636 W HCPCS: Performed by: STUDENT IN AN ORGANIZED HEALTH CARE EDUCATION/TRAINING PROGRAM

## 2018-04-03 PROCEDURE — 45381 COLONOSCOPY SUBMUCOUS NJX: CPT | Mod: 59,,, | Performed by: INTERNAL MEDICINE

## 2018-04-03 PROCEDURE — 85610 PROTHROMBIN TIME: CPT

## 2018-04-03 PROCEDURE — 45382 COLONOSCOPY W/CONTROL BLEED: CPT | Performed by: INTERNAL MEDICINE

## 2018-04-03 PROCEDURE — 85520 HEPARIN ASSAY: CPT | Mod: 91

## 2018-04-03 PROCEDURE — 85025 COMPLETE CBC W/AUTO DIFF WBC: CPT | Mod: 91

## 2018-04-03 PROCEDURE — 85520 HEPARIN ASSAY: CPT

## 2018-04-03 PROCEDURE — 45381 COLONOSCOPY SUBMUCOUS NJX: CPT | Performed by: INTERNAL MEDICINE

## 2018-04-03 PROCEDURE — 37000009 HC ANESTHESIA EA ADD 15 MINS: Performed by: INTERNAL MEDICINE

## 2018-04-03 PROCEDURE — 85730 THROMBOPLASTIN TIME PARTIAL: CPT

## 2018-04-03 RX ORDER — MEPERIDINE HYDROCHLORIDE 50 MG/ML
12.5 INJECTION INTRAMUSCULAR; INTRAVENOUS; SUBCUTANEOUS ONCE AS NEEDED
Status: DISCONTINUED | OUTPATIENT
Start: 2018-04-03 | End: 2018-04-03 | Stop reason: HOSPADM

## 2018-04-03 RX ORDER — WARFARIN SODIUM 5 MG/1
5 TABLET ORAL
Status: DISCONTINUED | OUTPATIENT
Start: 2018-04-04 | End: 2018-04-04 | Stop reason: HOSPADM

## 2018-04-03 RX ORDER — SODIUM CHLORIDE 0.9 % (FLUSH) 0.9 %
3 SYRINGE (ML) INJECTION
Status: DISCONTINUED | OUTPATIENT
Start: 2018-04-03 | End: 2018-04-03 | Stop reason: HOSPADM

## 2018-04-03 RX ORDER — PROPOFOL 10 MG/ML
VIAL (ML) INTRAVENOUS
Status: DISCONTINUED | OUTPATIENT
Start: 2018-04-03 | End: 2018-04-03

## 2018-04-03 RX ORDER — FENTANYL CITRATE 50 UG/ML
INJECTION, SOLUTION INTRAMUSCULAR; INTRAVENOUS
Status: DISCONTINUED | OUTPATIENT
Start: 2018-04-03 | End: 2018-04-03

## 2018-04-03 RX ORDER — LIDOCAINE HCL/PF 100 MG/5ML
SYRINGE (ML) INTRAVENOUS
Status: DISCONTINUED | OUTPATIENT
Start: 2018-04-03 | End: 2018-04-03

## 2018-04-03 RX ORDER — WARFARIN SODIUM 5 MG/1
5 TABLET ORAL DAILY
Status: DISCONTINUED | OUTPATIENT
Start: 2018-04-04 | End: 2018-04-03

## 2018-04-03 RX ORDER — PROPOFOL 10 MG/ML
VIAL (ML) INTRAVENOUS CONTINUOUS PRN
Status: DISCONTINUED | OUTPATIENT
Start: 2018-04-03 | End: 2018-04-03

## 2018-04-03 RX ORDER — PHENYLEPHRINE HYDROCHLORIDE 10 MG/ML
INJECTION INTRAVENOUS
Status: DISCONTINUED | OUTPATIENT
Start: 2018-04-03 | End: 2018-04-03

## 2018-04-03 RX ORDER — FERROUS SULFATE 325(65) MG
325 TABLET, DELAYED RELEASE (ENTERIC COATED) ORAL DAILY
Status: DISCONTINUED | OUTPATIENT
Start: 2018-04-03 | End: 2018-04-04 | Stop reason: HOSPADM

## 2018-04-03 RX ORDER — SODIUM CHLORIDE 9 MG/ML
INJECTION, SOLUTION INTRAVENOUS CONTINUOUS PRN
Status: DISCONTINUED | OUTPATIENT
Start: 2018-04-03 | End: 2018-04-03

## 2018-04-03 RX ADMIN — SODIUM CHLORIDE: 0.9 INJECTION, SOLUTION INTRAVENOUS at 08:04

## 2018-04-03 RX ADMIN — HEPARIN SODIUM 6 UNITS/KG/HR: 10000 INJECTION, SOLUTION INTRAVENOUS at 11:04

## 2018-04-03 RX ADMIN — FENTANYL CITRATE 50 MCG: 50 INJECTION, SOLUTION INTRAMUSCULAR; INTRAVENOUS at 09:04

## 2018-04-03 RX ADMIN — HEPARIN SODIUM 3 UNITS/KG/HR: 10000 INJECTION, SOLUTION INTRAVENOUS at 08:04

## 2018-04-03 RX ADMIN — OMEGA-3-ACID ETHYL ESTERS 2 G: 1 CAPSULE, LIQUID FILLED ORAL at 08:04

## 2018-04-03 RX ADMIN — CARVEDILOL 12.5 MG: 12.5 TABLET, FILM COATED ORAL at 11:04

## 2018-04-03 RX ADMIN — OMEGA-3-ACID ETHYL ESTERS 2 G: 1 CAPSULE, LIQUID FILLED ORAL at 11:04

## 2018-04-03 RX ADMIN — PHENYLEPHRINE HYDROCHLORIDE 200 MCG: 10 INJECTION INTRAVENOUS at 09:04

## 2018-04-03 RX ADMIN — CLOTRIMAZOLE AND BETAMETHASONE DIPROPIONATE: 10; .5 CREAM TOPICAL at 11:04

## 2018-04-03 RX ADMIN — NIFEDIPINE 90 MG: 30 TABLET, FILM COATED, EXTENDED RELEASE ORAL at 11:04

## 2018-04-03 RX ADMIN — CARVEDILOL 12.5 MG: 12.5 TABLET, FILM COATED ORAL at 08:04

## 2018-04-03 RX ADMIN — WARFARIN SODIUM 7.5 MG: 2.5 TABLET ORAL at 05:04

## 2018-04-03 RX ADMIN — HEPARIN SODIUM 9 UNITS/KG/HR: 10000 INJECTION, SOLUTION INTRAVENOUS at 02:04

## 2018-04-03 RX ADMIN — ISOSORBIDE MONONITRATE 120 MG: 120 TABLET ORAL at 11:04

## 2018-04-03 RX ADMIN — ATORVASTATIN CALCIUM 80 MG: 20 TABLET, FILM COATED ORAL at 01:04

## 2018-04-03 RX ADMIN — PROPOFOL 75 MCG/KG/MIN: 10 INJECTION, EMULSION INTRAVENOUS at 08:04

## 2018-04-03 RX ADMIN — ALLOPURINOL 100 MG: 100 TABLET ORAL at 11:04

## 2018-04-03 RX ADMIN — LIDOCAINE HYDROCHLORIDE 60 MG: 20 INJECTION, SOLUTION INTRAVENOUS at 08:04

## 2018-04-03 RX ADMIN — FENOFIBRATE 200 MG: 200 CAPSULE ORAL at 11:04

## 2018-04-03 RX ADMIN — PANTOPRAZOLE SODIUM 40 MG: 40 TABLET, DELAYED RELEASE ORAL at 03:04

## 2018-04-03 RX ADMIN — FERROUS SULFATE TAB EC 325 MG (65 MG FE EQUIVALENT) 325 MG: 325 (65 FE) TABLET DELAYED RESPONSE at 03:04

## 2018-04-03 RX ADMIN — PROPOFOL 50 MG: 10 INJECTION, EMULSION INTRAVENOUS at 08:04

## 2018-04-03 RX ADMIN — PANTOPRAZOLE SODIUM 40 MG: 40 TABLET, DELAYED RELEASE ORAL at 06:04

## 2018-04-03 RX ADMIN — FENTANYL CITRATE 50 MCG: 50 INJECTION, SOLUTION INTRAMUSCULAR; INTRAVENOUS at 08:04

## 2018-04-03 NOTE — ASSESSMENT & PLAN NOTE
Maroon stool produced on 4/2 which was guiac positive  Patient is on chronic anticoagulation for mechanical aortic valve, had been bridging to coumadin on lovenox at home  Continued on heparin gtt on admission  Suspect lower GI bleed based on hemodynamic stability and unchanging hemoglobin (from one week ago)  - continue oral PPI  - keep NPO  - heparin gtt for anticoagulation (held for supratherapeutic PTT)  - consulted CRS  - consulted GI- plan for colonoscopy today  - CBC bid

## 2018-04-03 NOTE — SUBJECTIVE & OBJECTIVE
Interval History: Patient has had three bloody bowel movements over the past day. However, he continues to deny any weakness, lightheadedness, chest pain, or shortness of breath. Plan for colonoscopy today.     Review of Systems   Constitutional: Negative for chills and fever.   Respiratory: Negative for cough, shortness of breath and wheezing.    Cardiovascular: Negative for chest pain and palpitations.   Gastrointestinal: Positive for blood in stool and diarrhea. Negative for abdominal pain, constipation, nausea and vomiting.   Genitourinary: Negative for dysuria and hematuria.   Musculoskeletal: Negative for back pain and neck pain.   Skin: Negative for color change and rash.   Neurological: Negative for light-headedness and headaches.     Objective:     Vital Signs (Most Recent):  Temp: 97.5 °F (36.4 °C) (04/03/18 0817)  Pulse: 63 (04/03/18 0846)  Resp: 18 (04/03/18 0817)  BP: (!) 192/83 (04/03/18 0817)  SpO2: 100 % (04/03/18 0817) Vital Signs (24h Range):  Temp:  [97.1 °F (36.2 °C)-98 °F (36.7 °C)] 97.5 °F (36.4 °C)  Pulse:  [54-66] 63  Resp:  [16-18] 18  SpO2:  [94 %-100 %] 100 %  BP: (125-192)/() 192/83     Weight: 75.8 kg (167 lb 1.7 oz)  Body mass index is 26.97 kg/m².  No intake or output data in the 24 hours ending 04/03/18 0846   Physical Exam   Constitutional: He is oriented to person, place, and time. He appears well-developed and well-nourished. No distress.   HENT:   Head: Normocephalic and atraumatic.   Mouth/Throat: Oropharynx is clear and moist.   Eyes: Conjunctivae and EOM are normal. No scleral icterus.   Neck: Normal range of motion. Neck supple.   Cardiovascular: Normal rate and regular rhythm.    Audible click   Pulmonary/Chest: Effort normal and breath sounds normal. No respiratory distress. He has no wheezes. He has no rales.   Abdominal: Soft. Bowel sounds are normal. He exhibits no distension. There is no tenderness. There is no guarding.   Musculoskeletal: Normal range of motion.  He exhibits no edema.   Neurological: He is alert and oriented to person, place, and time.   Skin: Skin is warm and dry. No rash noted. He is not diaphoretic.   Psychiatric: He has a normal mood and affect. His behavior is normal.       Significant Labs:   CBC:   Recent Labs  Lab 04/02/18  1241 04/02/18  1740 04/03/18  0446   WBC 5.92 6.11 5.42   HGB 10.2* 10.2* 11.0*   HCT 30.7* 31.2* 34.0*    176 190     CMP:   Recent Labs  Lab 04/01/18  1808 04/02/18  0407 04/03/18  0446    140 139   K 5.0 5.4* 4.5    111* 109   CO2 22* 22* 21*    61* 86   BUN 50* 42* 32*   CREATININE 1.9* 1.6* 1.4   CALCIUM 9.5 9.8 9.7   PROT 7.6  --   --    ALBUMIN 3.6  --   --    BILITOT 0.3  --   --    ALKPHOS 69  --   --    AST 62*  --   --    ALT 55*  --   --    ANIONGAP 7* 7* 9   EGFRNONAA 33.5* 41.2* 48.5*       Significant Imaging: I have reviewed all pertinent imaging results/findings within the past 24 hours.

## 2018-04-03 NOTE — PROGRESS NOTES
[facility] Hospital Medicine  Progress Note    Patient Name: Dariel Urbina  MRN: [id number]  Patient Class: OP- Observation   Admission Date: 4/1/2018  Length of Stay: 0 days  Attending Physician: Abdi Watson MD  Primary Care Provider: Devon aLngston MD    [facility] Medicine Team: [facility] MED 3 Liz Mata DO    Subjective:     Principal Problem:Gastrointestinal hemorrhage    HPI:  Patient is a 77-year-old man with diabetes, hypertension, CAD, HLD, gout and mechanical aortic valve who presents with bloody stool.  Patient was recently admitted for diarrhea that was watery, and non-bloody. During his admission the patient developed an JIM, and with fluid replacement he developed hyperkalemia, which when being corrected the patient developed an NSTEMI. [facility] determined no acute intervention was needed but patient is instructed to follow up. Patient also had a nose bleed during admission and was found to have an elevated INR of 4.4, but that came down to 1.6 and the patient had ASA, Plavix during hospitalization and was discharged with Lovenox and coumadin. At the end of hospitalization, the patient had normal BM. After hospitalization, patient had 2 days of no BM, followed by 3-5 loose stools on Saturday. Today the patient notes that he had a loose bowel movement and noted pinkish water in the bowl after word. At 1 pm the patient had a second BM, in which he and his wife noted the same pinkish red bloody color in the bowl as before. Patient's wife was concerned and brought him to the hospital. Patient had been taking 1 lovenox per day for the last 3 days, and coumadin and was supposed to present to the coumadin clinic tomorrow. Pt states that he had 1 BM while in ED that was not bloody, although this was not witnessed by others. Pt states that he has had diarrhea for months now, losing around 30lb from November until now. He has not seen a GI physician for workup of diarrhea. Pt  denies abdo pain, chest pain, SOB, dizziness, lightheadedness, fatigue, leg swelling.      Hospital Course:  Mr. Urbina was admitted to hospital medicine on 4/1 for bloody bowel movements. On 4/2, he had an observed maroon bowel movement which was FOBT positive. Colorectal surgery was consulted, who requested that GI be consulted as well. Patient underwent colonoscopy on 4/3.     Interval History: Patient has had three bloody bowel movements over the past day. However, he continues to deny any weakness, lightheadedness, chest pain, or shortness of breath. Plan for colonoscopy today.     Review of Systems   Constitutional: Negative for chills and fever.   Respiratory: Negative for cough, shortness of breath and wheezing.    Cardiovascular: Negative for chest pain and palpitations.   Gastrointestinal: Positive for blood in stool and diarrhea. Negative for abdominal pain, constipation, nausea and vomiting.   Genitourinary: Negative for dysuria and hematuria.   Musculoskeletal: Negative for back pain and neck pain.   Skin: Negative for color change and rash.   Neurological: Negative for light-headedness and headaches.     Objective:     Vital Signs (Most Recent):  Temp: 97.5 °F (36.4 °C) (04/03/18 0817)  Pulse: 63 (04/03/18 0846)  Resp: 18 (04/03/18 0817)  BP: (!) 192/83 (04/03/18 0817)  SpO2: 100 % (04/03/18 0817) Vital Signs (24h Range):  Temp:  [97.1 °F (36.2 °C)-98 °F (36.7 °C)] 97.5 °F (36.4 °C)  Pulse:  [54-66] 63  Resp:  [16-18] 18  SpO2:  [94 %-100 %] 100 %  BP: (125-192)/() 192/83     Weight: 75.8 kg (167 lb 1.7 oz)  Body mass index is 26.97 kg/m².  No intake or output data in the 24 hours ending 04/03/18 0846   Physical Exam   Constitutional: He is oriented to person, place, and time. He appears well-developed and well-nourished. No distress.   HENT:   Head: Normocephalic and atraumatic.   Mouth/Throat: Oropharynx is clear and moist.   Eyes: Conjunctivae and EOM are normal. No scleral icterus.   Neck:  Normal range of motion. Neck supple.   Cardiovascular: Normal rate and regular rhythm.    Audible click   Pulmonary/Chest: Effort normal and breath sounds normal. No respiratory distress. He has no wheezes. He has no rales.   Abdominal: Soft. Bowel sounds are normal. He exhibits no distension. There is no tenderness. There is no guarding.   Musculoskeletal: Normal range of motion. He exhibits no edema.   Neurological: He is alert and oriented to person, place, and time.   Skin: Skin is warm and dry. No rash noted. He is not diaphoretic.   Psychiatric: He has a normal mood and affect. His behavior is normal.       Significant Labs:   CBC:   Recent Labs  Lab 04/02/18  1241 04/02/18  1740 04/03/18  0446   WBC 5.92 6.11 5.42   HGB 10.2* 10.2* 11.0*   HCT 30.7* 31.2* 34.0*    176 190     CMP:   Recent Labs  Lab 04/01/18  1808 04/02/18  0407 04/03/18  0446    140 139   K 5.0 5.4* 4.5    111* 109   CO2 22* 22* 21*    61* 86   BUN 50* 42* 32*   CREATININE 1.9* 1.6* 1.4   CALCIUM 9.5 9.8 9.7   PROT 7.6  --   --    ALBUMIN 3.6  --   --    BILITOT 0.3  --   --    ALKPHOS 69  --   --    AST 62*  --   --    ALT 55*  --   --    ANIONGAP 7* 7* 9   EGFRNONAA 33.5* 41.2* 48.5*       Significant Imaging: I have reviewed all pertinent imaging results/findings within the past 24 hours.    Assessment/Plan:      * Gastrointestinal hemorrhage    Maroon stool produced on 4/2 which was guiac positive  Patient is on chronic anticoagulation for mechanical aortic valve, had been bridging to coumadin on lovenox at home  Continued on heparin gtt on admission  Suspect lower GI bleed based on hemodynamic stability and unchanging hemoglobin (from one week ago)  - continue oral PPI  - keep NPO  - heparin gtt for anticoagulation (held for supratherapeutic PTT)  - consulted CRS  - consulted GI- plan for colonoscopy today  - CBC bid        H/O mechanical aortic valve replacement    - Patient is asymptomatic and stable  -  Stopped home meds- patient was being bridged from lovenox to coumadin, but due to bleeding the patient was placed on heparin drip for short half life            CKD (chronic kidney disease) stage 3, GFR 30-59 ml/min      Baseline creatinine 1.6-1.9  Creatinine 1.6 on 4/2        CAD (coronary atherosclerotic disease)    HYPERTENSION  HYPERLIPIDEMIA    - Patient is asymptomatic- without chest pain, SOB  - Currently not on ASA or plavix due to recurrent nosebleeds  - Maintain HTN and HLD medications  - Continue to monitor            Type 2 diabetes mellitus with stage 3 chronic kidney disease, without long-term current use of insulin    A1c 5.4 on recent admission, not on any antihyperglycemics  - monitor with daily CMP              Renovascular hypertension    - Patient is asymptomatic and stable- elevated BP upon admission 203/84, down to 160's/70s- patient did not have his evening doses of BP medication  - Treating with home meds- coreg, Imdur, nifedipine  - holding lasix given loose stools            Hyperlipidemia    - Patient is asymptomatic and stable  - Treating with home meds- lipitor and fenofibrate  - Continue to monitor          Long term current use of anticoagulant therapy    - see above            VTE Risk Mitigation         Ordered     heparin 25,000 units in dextrose 5% 250 mL (100 units/mL) infusion  Continuous     Route:  Intravenous        04/02/18 1312     heparin 25,000 units in dextrose 5% 250 mL (100 units/mL) bolus from bag; ADDITIONAL PRN BOLUS  As needed (PRN)     Route:  Intravenous        04/02/18 1034     heparin 25,000 units in dextrose 5% 250 mL (100 units/mL) bolus from bag; ADDITIONAL PRN BOLUS  As needed (PRN)     Route:  Intravenous        04/02/18 1034              Liz Mata DO  Department of Hospital Medicine   [facility]

## 2018-04-03 NOTE — DISCHARGE INSTRUCTIONS
Colonoscopy     A camera attached to a flexible tube with a viewing lens is used to take video pictures.     Colonoscopy is a test to view the inside of your lower digestive tract (colon and rectum). Sometimes it can show the last part of the small intestine (ileum). During the test, small pieces of tissue may be removed for testing. This is called a biopsy. Small growths, such as polyps, may also be removed.   Why is colonoscopy done?  The test is done to help look for colon cancer. And it can help find the source of abdominal pain, bleeding, and changes in bowel habits. It may be needed once a year, depending on factors such as your:  · Age  · Health history  · Family health history  · Symptoms  · Results from any prior colonoscopy  Risks and possible complications  These include:  · Bleeding               · A puncture or tear in the colon   · Risks of anesthesia  · A cancer lesion not being seen  Getting ready   To prepare for the test:  · Talk with your healthcare provider about the risks of the test (see below). Also ask your healthcare provider about alternatives to the test.  · Tell your healthcare provider about any medicines you take. Also tell him or her about any health conditions you may have.  · Make sure your rectum and colon are empty for the test. Follow the diet and bowel prep instructions exactly. If you dont, the test may need to be rescheduled.  · Plan for a friend or family member to drive you home after the test.     Colonoscopy provides an inside view of the entire colon.     You may discuss the results with your doctor right away or at a future visit.  During the test   The test is usually done in the hospital on an outpatient basis. This means you go home the same day. The procedure takes about 30 minutes. During that time:  · You are given relaxing (sedating) medicine through an IV line. You may be drowsy, or fully asleep.  · The healthcare provider will first give you a physical exam to  check for anal and rectal problems.  · Then the anus is lubricated and the scope inserted.  · If you are awake, you may have a feeling similar to needing to have a bowel movement. You may also feel pressure as air is pumped into the colon. Its OK to pass gas during the procedure.  · Biopsy, polyp removal, or other treatments may be done during the test.  After the test   You may have gas right after the test. It can help to try to pass it to help prevent later bloating. Your healthcare provider may discuss the results with you right away. Or you may need to schedule a follow-up visit to talk about the results. After the test, you can go back to your normal eating and other activities. You may be tired from the sedation and need to rest for a few hours.  Date Last Reviewed: 11/1/2016 © 2000-2017 [employer]. [address] [place] [address], [place] [address]. All rights reserved. This information is not intended as a substitute for professional medical care. Always follow your healthcare professional's instructions.

## 2018-04-03 NOTE — INTERVAL H&P NOTE
Pre-Procedure H and P Addendum    Patient seen and examined.  History and exam unchanged from prior history and physical.      Procedure: Colonoscopy   Indication: Hematochezia  ASA Class: per anesthesiology  Airway: normal  Neck Mobility: full range of motion  Mallampatti score: per anesthesia  History of anesthesia problems: no  Family history of anesthesia problems: no  Anesthesia Plan: MAC    Anesthesia/Surgery risks, benefits and alternative options discussed and understood by patient/family.          Active Hospital Problems    Diagnosis  POA    *Gastrointestinal hemorrhage [K92.2]  Yes    CKD (chronic kidney disease) stage 3, GFR 30-59 ml/min [N18.3]  Yes    H/O mechanical aortic valve replacement [Z95.2]  Not Applicable    Type 2 diabetes mellitus with stage 3 chronic kidney disease, without long-term current use of insulin [E11.22, N18.3]  Yes    Long term current use of anticoagulant therapy [Z79.01]  Not Applicable    Hyperlipidemia [E78.5]  Yes    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]  Yes    CAD (coronary atherosclerotic disease) [I25.10]  Yes      Resolved Hospital Problems    Diagnosis Date Resolved POA    Acute renal failure superimposed on stage 3 chronic kidney disease [N17.9, N18.3] 04/02/2018 Yes

## 2018-04-03 NOTE — PROVATION PATIENT INSTRUCTIONS
Discharge Summary/Instructions after an Endoscopic Procedure  Patient Name: Dariel Urbina  Patient MRN: [id number]  Patient YOB: 1940 Tuesday, April 03, 2018  Bonifacio Pelletier MD  RESTRICTIONS:  During your procedure today, you received medications for sedation.  These   medications may affect your judgment, balance and coordination.  Therefore,   for 24 hours, you have the following restrictions:   - DO NOT drive a car, operate machinery, make legal/financial decisions,   sign important papers or drink alcohol.    ACTIVITY:  The following day: return to full activity including work, except no heavy   lifting, straining or running for 3 days if polyps were removed.  DIET:  Eat and drink normally unless instructed otherwise.     TREATMENT FOR COMMON SIDE EFFECTS:  - Mild abdominal pain, nausea, belching, bloating or excessive gas:  rest,   eat lightly and use a heating pad.  - Sore Throat: treat with throat lozenges and/or gargle with warm salt   water.  - Because air was used during the procedure, expelling large amounts of air   from your rectum or belching is normal.  - If a bowel prep was taken, you may not have a bowel movement for 1-3 days.    This is normal.  SYMPTOMS TO WATCH FOR AND REPORT TO YOUR PHYSICIAN:  1. Abdominal pain or bloating, other than gas cramps.  2. Chest pain.  3. Back pain.  4. Signs of infection such as: chills or fever occurring within 24 hours   after the procedure.  5. Rectal bleeding, which would show as bright red, maroon, or black stools.   (A tablespoon of blood from the rectum is not serious, especially if   hemorrhoids are present.)  6. Vomiting.  7. Weakness or dizziness.  GO DIRECTLY TO THE NEAREST EMERGENCY ROOM IF YOU HAVE ANY OF THE FOLLOWING:      Difficulty breathing              Chills and/or fever over 101 F   Persistent vomiting and/or vomiting blood   Severe abdominal pain   Severe chest pain   Black, tarry stools   Bleeding- more than one tablespoon   Any  other symptom or condition that you feel may need urgent attention  Your doctor recommends these additional instructions:  If any biopsies were taken, your doctors clinic will contact you in 1 to 2   weeks with any results.  - [facility] patient to [facility] for [facility] care.   - Clear liquid diet.   - Continue present medications.   - Resume Coumadin (warfarin) tomorrow and heparin today at prior doses.   - Repeat colonoscopy in 3 months for retreatment.   For questions, problems or results please call your physician - Bonifacio Pelletier MD at Work:  [phone].  [facility], EMERGENCY ROOM PHONE NUMBER: [phone]  IF A COMPLICATION OR EMERGENCY SITUATION ARISES AND YOU ARE UNABLE TO REACH   YOUR PHYSICIAN - GO DIRECTLY TO THE EMERGENCY ROOM.  Bonifacio Pelletier MD  4/3/2018 10:02:39 AM  This report has been verified and signed electronically.

## 2018-04-03 NOTE — PLAN OF CARE
Patient arrived from Endoscopy with JILLIAN Dunn RN and MICHAEL Reis CRNA.  Patient stable.  Report received at this time.  Assumed care of patient at this time.

## 2018-04-03 NOTE — TREATMENT PLAN
GI Follow-up Note    Impression:           - Hemorrhoids found on perianal exam.                        - Blood in the rectum and in the sigmoid colon.                        - Patent end-to-end colo-colonic anastomosis,                         characterized by healthy appearing mucosa.                        - Diverticulosis in the sigmoid colon.                        - Diverticulosis in the sigmoid colon. There was                         active bleeding coming from the diverticular                         opening. Clip (MR conditional) was placed.                        - One 4 mm polyp in the descending colon. Resection                         not attempted.                        - One 10 mm polyp at the splenic flexure. Resection                         not attempted. Tattooed.                        - The examination was otherwise normal.                        - The examined portion of the ileum was normal.                        - No specimens collected.    Recommendation:       - Return patient to [facility] for ongoing care.                        - Clear liquid diet.                        - Continue present medications.                        - Resume Coumadin (warfarin) tomorrow and heparin                         today at prior doses.                        - Repeat colonoscopy in 3 months for retreatment.    Watch for rebleeding.  Call us back if rebleeds.  Will arrange outpatient colon in 3 months to remove polyps.    Will sign off at this time.  Please call with any additional concerns /questions    David Steel MD  Gastroenterology Fellow (PGY-V)  Pager: [phone]

## 2018-04-03 NOTE — PHARMACY MED REC
"Admission Medication Reconciliation - Pharmacy Consult Note    The home medication history was taken by Nathalie Khan, pharmacy technician.  Based on information gathered and subsequent review by the clinical pharmacist, the items below may need attention.     You may go to "Admission" then "Reconcile Home Medications" tabs to review and/or act upon these items. Based on information gathered and subsequent review by the clinical pharmacist, the items below may need attention.    Potentially problematic discrepancies with current MAR  o Patient IS taking the following which was not ordered upon admit  o Ferrous sulfate 325 mg QD  o Furosemide 20 mg QD (held)   o Glimepiride 1 mg QAM (held)   o Warfarin 7.5 mg M/W/F and 5 mg all other days  o Patient is taking a drug DIFFERENTLY than how ordered upon admit  o Procardia XL 60 mg QD (ordered as Procardia XL 90 mg QD)     Please address this information as you see fit.  Feel free to contact us if you have any questions or require assistance.  Radha Cottrell  EXT [id number]    Patient's prior to admission medication regimen was as follows:  Current Outpatient Prescriptions on File Prior to Encounter   Medication Sig Dispense Refill Last Dose    allopurinol (ZYLOPRIM) 100 MG tablet TAKE 1 TABLET ONE TIME DAILY 90 tablet 4 4/1/2018    atorvastatin (LIPITOR) 80 MG tablet TAKE 1 TABLET ONE TIME DAILY 90 tablet 3 4/1/2018    carvedilol (COREG) 6.25 MG tablet Take 2 tablets (12.5 mg total) by mouth 2 (two) times daily. 60 tablet 3 4/1/2018    clotrimazole-betamethasone 1-0.05% (LOTRISONE) cream Apply topically once daily. in between the 4th and 5th toes left foot. 45 g 1 4/1/2018    enoxaparin (LOVENOX) 120 mg/0.8 mL Syrg Inject 0.8 mLs (120 mg total) into the skin once daily. 4.8 mL 0 4/1/2018    fenofibrate micronized (LOFIBRA) 200 MG Cap Take 200 mg by mouth daily with breakfast.   4/1/2018    ferrous sulfate 325 (65 FE) MG EC tablet Take 1 tablet (325 mg total) by " mouth once daily. 90 tablet 4 4/1/2018    furosemide (LASIX) 20 MG tablet TAKE 1 TABLET ONE TIME DAILY 90 tablet 4 4/1/2018    glimepiride (AMARYL) 1 MG tablet Take 1 tablet (1 mg total) by mouth every morning. 90 tablet 2 4/1/2018    isosorbide mononitrate (IMDUR) 120 MG 24 hr tablet Take 1 tablet (120 mg total) by mouth once daily. 90 tablet 3 4/1/2018    NIFEdipine (PROCARDIA-XL) 30 MG (OSM) 24 hr tablet Take 2 tablets (60 mg total) by mouth once daily. 60 tablet 4 4/1/2018    nitroGLYCERIN (NITROSTAT) 0.4 MG SL tablet Place 1 tablet (0.4 mg total) under the tongue every 5 (five) minutes as needed. As needed for chest pain 90 tablet 4     omega-3 acid ethyl esters (LOVAZA) 1 gram capsule Take 2 capsules (2 g total) by mouth 2 (two) times daily. 360 capsule 5 4/1/2018    ranitidine (ZANTAC) 300 MG tablet Take 1 tablet (300 mg total) by mouth every evening. 90 tablet 3 3/31/2018    warfarin (COUMADIN) 5 MG tablet Take 1 tablet (5 mg total) by mouth Daily. Current Dose (7.5 mg on Mon, Wed, Fri; 5 mg all other days) 120 tablet 3 3/31/2018         .    .

## 2018-04-03 NOTE — H&P (VIEW-ONLY)
[facility]  Gastroenterology  Consult Note    Patient Name: Dariel Urbina  MRN: [id number]  Admission Date: 4/1/2018  Hospital Length of Stay: 0 days  Code Status: Full Code   Attending Provider: Abdi Watson MD   Consulting Provider: Jorge Steel MD  Primary Care Physician: Devon Langston MD  Principal Problem:Gastrointestinal hemorrhage    Inpatient consult to Gastroenterology  Consult performed by: JORGE STEEL  Consult ordered by: TOYA RUIZ        Subjective:     HPI:  This is a 78 y/o male with hx chronic diarrhea, diveritculitis s/p left sided colon resection?, DM, HTN, CAD, mechanical AV who presented with blood in stool.  Pt recently had admission for watery nonbloody diarrhea. Discharged last Wednesday. Also recently had NSTEMI, no intervention on [facility].  After discharge, patient had 2 days of constipation and this was followed by 3 -5 loose stools on Saturday.   That progressed to pinkish red color in the toilet bowl. His wife made him come in for evaluation.  He has been taking lovenox daily as outpatient as well as coumadin. He is not on asa or plavix.    Labs show Hgb stable and at baseline. Vital and hemodynamics stable.    Had colonoscopy 1 year ago - normal. Biopsies taken for micro colitis - negative.    Past Medical History:   Diagnosis Date    Angina, class III 9/17/2014    Bilateral carotid artery disease 2/9/2017    CAD (coronary atherosclerotic disease) 9/11/2012    Cataract     Chronic kidney disease     Claudication of left lower extremity 9/17/2014    DM (diabetes mellitus) 9/26/2012    History of gout 9/26/2012    Hyperlipidemia     Hypertension     Mechanical heart valve present     NSTEMI (non-ST elevated myocardial infarction) 3/21/2018       Past Surgical History:   Procedure Laterality Date    CARDIAC CATHETERIZATION      CARDIAC VALVE REPLACEMENT      CARDIAC VALVE SURGERY      COLON SURGERY      COLONOSCOPY N/A  3/31/2017    Procedure: COLONOSCOPY;  Surgeon: Bruno Raymond MD;  Location: [facility] ([address] [place] [address]);  Service: Endoscopy;  Laterality: N/A;  Patient's wife requesting date.    CORONARY ANGIOPLASTY      CORONARY ARTERY BYPASS GRAFT      HERNIA REPAIR      SPINE SURGERY      VASECTOMY         Review of patient's allergies indicates:   Allergen Reactions    Fosinopril      Intolerance- elevates potassium level      Losartan      Intolerance- elevates potassium level     Family History     Problem Relation (Age of Onset)    Alcohol abuse Father    Diabetes Brother    Heart disease Mother, Father, Brother, Sister    Heart failure Mother, Father, Brother    No Known Problems Sister, Maternal Grandmother, Maternal Grandfather, Paternal Grandmother, Paternal Grandfather, Maternal Aunt, Maternal Uncle, Paternal Aunt, Paternal Uncle        Social History Main Topics    Smoking status: Former Smoker     Packs/day: 1.00     Years: 20.00     Quit date: 9/11/1980    Smokeless tobacco: Never Used    Alcohol use No    Drug use: No    Sexual activity: No     Review of Systems   Constitutional: Positive for fatigue. Negative for chills and fever.   HENT: Negative for facial swelling, mouth sores and trouble swallowing.    Eyes: Negative for pain and redness.   Respiratory: Negative for cough and shortness of breath.    Cardiovascular: Negative for chest pain and leg swelling.   Gastrointestinal: Positive for anal bleeding and blood in stool. Negative for abdominal pain.   Genitourinary: Negative for dysuria and hematuria.   Musculoskeletal: Negative for gait problem and neck stiffness.   Skin: Negative for rash and wound.   Neurological: Negative for seizures and headaches.   Hematological: Bruises/bleeds easily.   Psychiatric/Behavioral: Negative for confusion and hallucinations.     Objective:     Vital Signs (Most Recent):  Temp: 97.1 °F (36.2 °C) (04/02/18 1153)  Pulse: (!) 56 (04/02/18 1156)  Resp: 16 (04/02/18  1153)  BP: (!) 147/67 (04/02/18 1153)  SpO2: 96 % (04/02/18 1153) Vital Signs (24h Range):  Temp:  [97.1 °F (36.2 °C)-98.3 °F (36.8 °C)] 97.1 °F (36.2 °C)  Pulse:  [49-62] 56  Resp:  [15-19] 16  SpO2:  [93 %-99 %] 96 %  BP: (147-203)/(67-84) 147/67     Weight: 75.8 kg (167 lb 1.7 oz) (04/02/18 0000)  Body mass index is 26.97 kg/m².      Intake/Output Summary (Last 24 hours) at 04/02/18 1404  Last data filed at 04/01/18 1855   Gross per 24 hour   Intake              510 ml   Output                0 ml   Net              510 ml       Lines/Drains/Airways     Peripheral Intravenous Line                 Peripheral IV - Single Lumen 04/01/18 1808 Left Antecubital less than 1 day                Physical Exam   Constitutional: He is oriented to person, place, and time. He appears well-developed and well-nourished. No distress.   HENT:   Head: Normocephalic and atraumatic.   Eyes: Conjunctivae and EOM are normal.   Neck: Neck supple. No thyromegaly present.   Cardiovascular: Normal rate, regular rhythm and intact distal pulses.    Mechanical valve click   Pulmonary/Chest: Effort normal. No respiratory distress.   Abdominal: Soft. He exhibits no distension. There is no tenderness.   Genitourinary:   Genitourinary Comments: Rectal: no external lesions ; red blood in vault ; toilet with liquid red blood mixed with stool.   Musculoskeletal: Normal range of motion. He exhibits no tenderness.   Neurological: He is alert and oriented to person, place, and time. No cranial nerve deficit.   Skin: Skin is warm and dry. No rash noted.   Psychiatric: He has a normal mood and affect. His behavior is normal.   Vitals reviewed.      Significant Labs:  Acute Hepatitis Panel: No results for input(s): HEPBSAG, HEPAIGM, HEPCAB in the last 48 hours.    Invalid input(s): HAPBIGM  Amylase: No results for input(s): AMYLASE in the last 48 hours.  Blood Culture: No results for input(s): LABBLOO in the last 48 hours.  CBC:   Recent Labs  Lab  04/01/18  2308 04/02/18  0408 04/02/18  1241   WBC 5.86 5.42 5.92   HGB 10.2* 10.4* 10.2*   HCT 31.0* 32.2* 30.7*    187 175     BMP:   Recent Labs  Lab 04/01/18  1808 04/02/18  0407    61*    140   K 5.0 5.4*    111*   CO2 22* 22*   BUN 50* 42*   CREATININE 1.9* 1.6*   CALCIUM 9.5 9.8   MG 2.0  --      CMP:   Recent Labs  Lab 04/01/18  1808 04/02/18  0407    61*   CALCIUM 9.5 9.8   ALBUMIN 3.6  --    PROT 7.6  --     140   K 5.0 5.4*   CO2 22* 22*    111*   BUN 50* 42*   CREATININE 1.9* 1.6*   ALKPHOS 69  --    ALT 55*  --    AST 62*  --    BILITOT 0.3  --      Coagulation:   Recent Labs  Lab 04/02/18  0407   INR 1.7*   APTT 85.2*     CRP: No results for input(s): CRP in the last 48 hours.  ESR: No results for input(s): SEDRATE in the last 48 hours.  H.Pylori Ab IgG: No results for input(s): HPYLORIIGG in the last 48 hours.  Lipase:   Recent Labs  Lab 04/01/18  1808   LIPASE 82*     Liver Function Test:   Recent Labs  Lab 04/01/18  1808   ALT 55*   AST 62*   ALKPHOS 69   BILITOT 0.3   PROT 7.6   ALBUMIN 3.6     Peritoneal Fluid Cultures: No results for input(s): AEROBICCULTU, LABGRAM in the last 48 hours.  Stool C. diff: No results for input(s): CDIFFICILEAN, CDIFFTOX in the last 48 hours.  Stool Culture: No results for input(s): STOOLCULTURE in the last 48 hours.  Stool Ova/Cysts/Parasites: No results for input(s): STLEXAMOCP in the last 48 hours.  Stool Giardia/Crypto: No results for input(s): GIARDIAANTIG, CRSPAG in the last 48 hours.  Stool WBCs: No results for input(s): STOOLWBC in the last 48 hours.  Stool Lactoferrin: No results for input(s): LACTOFERRINS in the last 48 hours.    Significant Imaging:  Imaging results within the past 24 hours have been reviewed.    Assessment/Plan:     * Gastrointestinal hemorrhage    Currently HDS and Hgb is at baseline.  Suspect possible hemorrhoids vs. Diverticular source.  Given need for continued anticoagulation, will proceed  with full colonoscopy to further eval.    Recs:  Colonoscopy tomorrow  Clear liquid diet today and NPO after midnight  Bedside commode and Tuck's wipes to bedside  Golytely 1 gallon:   1/2 gallon between 6-8pm (8oz q15min)   1/2 gallon between 4-6am (8oz until complete)  Trend CBC BID for now  Continue ppi po daily  Continue heparin gtt, would hold1 hour prior to time of procedure.                Thank you for your consult. I will follow-up with patient. Please contact us if you have any additional questions.    David Steel MD  Gastroenterology  [facility]

## 2018-04-03 NOTE — ASSESSMENT & PLAN NOTE
DIVERTICULOSIS    Maroon stool produced on 4/2 which was guiac positive  Patient is on chronic anticoagulation for mechanical aortic valve, had been bridging to coumadin on lovenox at home  Continued on heparin gtt on admission  Suspect lower GI bleed based on hemodynamic stability and unchanging hemoglobin (from one week ago)  Per colonoscopy report: Hemorrhoids found on perianal exam. Blood in the rectum and in the sigmoid colon. Diverticulosis in the sigmoid colon. There was active bleeding coming from the diverticular opening. Clip (MR conditional) was placed. One 4mm polyp in descending colon and one 10mm polyp at splenic flexure, not resected    - continue oral PPI  - clear liquid diet  - heparin gtt for anticoagulation  - CBC bid  - will resume coumadin on 4/4  - will need to follow up in 3 months for repeat colonoscopy  - will monitor for continued bloody stools

## 2018-04-03 NOTE — TRANSFER OF CARE
"Anesthesia Transfer of Care Note    Patient: Dariel Urbina    Procedure(s) Performed: Procedure(s) (LRB):  COLONOSCOPY (N/A)    Patient location: [facility]    Anesthesia Type: general    Transport from OR: Transported from OR on room air with adequate spontaneous ventilation    Post pain: adequate analgesia    Post assessment: no apparent anesthetic complications    Post vital signs: stable    Level of consciousness: awake and alert    Nausea/Vomiting: no nausea/vomiting    Complications: none    Transfer of care protocol was followed      Last vitals:   Visit Vitals  BP (!) 192/83 (BP Location: Left arm, Patient Position: Lying)   Pulse 63   Temp 36.4 °C (97.5 °F) (Temporal)   Resp 18   Ht 5' 6" (1.676 m)   Wt 75.8 kg (167 lb 1.7 oz)   SpO2 100%   BMI 26.97 kg/m²     "

## 2018-04-03 NOTE — PLAN OF CARE
Problem: Patient Care Overview  Goal: Plan of Care Review  Outcome: Ongoing (interventions implemented as appropriate)  POC reviewed with pt. Pt. AAA x 4 and verbalizes understanding. Colonoscopy completed, tolerating clear liquid diet well. Heparin drip in progress. Safety precautions in progress, will continue to monitor.

## 2018-04-03 NOTE — ANESTHESIA POSTPROCEDURE EVALUATION
"Anesthesia Post Evaluation    Patient: Dariel Urbina    Procedure(s) Performed: Procedure(s) (LRB):  COLONOSCOPY (N/A)    Final Anesthesia Type: general  Patient location during evaluation: PACU  Patient participation: Yes- Able to Participate  Level of consciousness: awake and alert  Post-procedure vital signs: reviewed and stable  Pain management: adequate  Airway patency: patent  PONV status at discharge: No PONV  Anesthetic complications: no      Cardiovascular status: blood pressure returned to baseline and hemodynamically stable  Respiratory status: unassisted, spontaneous ventilation and room air  Hydration status: euvolemic  Follow-up not needed.        Visit Vitals  BP (!) 148/77 (BP Location: Right arm, Patient Position: Lying)   Pulse 86   Temp 36.6 °C (97.9 °F) (Temporal)   Resp 18   Ht 5' 6" (1.676 m)   Wt 75.8 kg (167 lb 1.7 oz)   SpO2 99%   BMI 26.97 kg/m²       Pain/Sanjeev Score: Pain Assessment Performed: Yes (4/3/2018  8:20 AM)  Presence of Pain: denies (4/3/2018  8:20 AM)      "

## 2018-04-03 NOTE — PLAN OF CARE
Problem: Patient Care Overview  Goal: Plan of Care Review  Outcome: Ongoing (interventions implemented as appropriate)  Pt remains free of injury and falls. Pt tolerated bowel prep well. New IV placed to Lt Fa. Pt makes statement of no pain. Bed low and locked, Call light within reach.

## 2018-04-03 NOTE — PROGRESS NOTES
Verified with Central Telemetry Monitoring that patient is visible on their monitor prior to patient's transfer back to room 921A.  Telemetry Tech verified that patient is visible on monitor.

## 2018-04-04 VITALS
WEIGHT: 167.13 LBS | OXYGEN SATURATION: 95 % | DIASTOLIC BLOOD PRESSURE: 60 MMHG | BODY MASS INDEX: 26.86 KG/M2 | SYSTOLIC BLOOD PRESSURE: 130 MMHG | HEIGHT: 66 IN | TEMPERATURE: 98 F | RESPIRATION RATE: 20 BRPM | HEART RATE: 65 BPM

## 2018-04-04 PROBLEM — K64.8 INTERNAL HEMORRHOID: Status: ACTIVE | Noted: 2018-04-03

## 2018-04-04 PROBLEM — K57.91 GASTROINTESTINAL HEMORRHAGE ASSOCIATED WITH INTESTINAL DIVERTICULOSIS: Status: ACTIVE | Noted: 2018-04-01

## 2018-04-04 LAB
ANION GAP SERPL CALC-SCNC: 6 MMOL/L
BASOPHILS # BLD AUTO: 0.03 K/UL
BASOPHILS # BLD AUTO: 0.03 K/UL
BASOPHILS NFR BLD: 0.6 %
BASOPHILS NFR BLD: 0.7 %
BUN SERPL-MCNC: 24 MG/DL
CALCIUM SERPL-MCNC: 9.6 MG/DL
CHLORIDE SERPL-SCNC: 110 MMOL/L
CO2 SERPL-SCNC: 24 MMOL/L
CREAT SERPL-MCNC: 1.4 MG/DL
DIFFERENTIAL METHOD: ABNORMAL
DIFFERENTIAL METHOD: ABNORMAL
EOSINOPHIL # BLD AUTO: 0.1 K/UL
EOSINOPHIL # BLD AUTO: 0.1 K/UL
EOSINOPHIL NFR BLD: 1.7 %
EOSINOPHIL NFR BLD: 2.6 %
ERYTHROCYTE [DISTWIDTH] IN BLOOD BY AUTOMATED COUNT: 13.9 %
ERYTHROCYTE [DISTWIDTH] IN BLOOD BY AUTOMATED COUNT: 14 %
EST. GFR  (AFRICAN AMERICAN): 56 ML/MIN/1.73 M^2
EST. GFR  (NON AFRICAN AMERICAN): 48.5 ML/MIN/1.73 M^2
FACT X PPP CHRO-ACNC: 0.62 IU/ML
FACT X PPP CHRO-ACNC: 0.91 IU/ML
GLUCOSE SERPL-MCNC: 82 MG/DL
HCT VFR BLD AUTO: 30.3 %
HCT VFR BLD AUTO: 31.6 %
HGB BLD-MCNC: 10 G/DL
HGB BLD-MCNC: 10.2 G/DL
IMM GRANULOCYTES # BLD AUTO: 0.02 K/UL
IMM GRANULOCYTES # BLD AUTO: 0.02 K/UL
IMM GRANULOCYTES NFR BLD AUTO: 0.4 %
IMM GRANULOCYTES NFR BLD AUTO: 0.5 %
INR PPP: 1.3
LYMPHOCYTES # BLD AUTO: 1.5 K/UL
LYMPHOCYTES # BLD AUTO: 1.7 K/UL
LYMPHOCYTES NFR BLD: 29.7 %
LYMPHOCYTES NFR BLD: 39.6 %
MCH RBC QN AUTO: 30 PG
MCH RBC QN AUTO: 30 PG
MCHC RBC AUTO-ENTMCNC: 32.3 G/DL
MCHC RBC AUTO-ENTMCNC: 33 G/DL
MCV RBC AUTO: 91 FL
MCV RBC AUTO: 93 FL
MONOCYTES # BLD AUTO: 0.4 K/UL
MONOCYTES # BLD AUTO: 0.5 K/UL
MONOCYTES NFR BLD: 10.7 %
MONOCYTES NFR BLD: 8.3 %
NEUTROPHILS # BLD AUTO: 1.9 K/UL
NEUTROPHILS # BLD AUTO: 3.1 K/UL
NEUTROPHILS NFR BLD: 45.9 %
NEUTROPHILS NFR BLD: 59.3 %
NRBC BLD-RTO: 0 /100 WBC
NRBC BLD-RTO: 0 /100 WBC
PLATELET # BLD AUTO: 174 K/UL
PLATELET # BLD AUTO: 177 K/UL
PMV BLD AUTO: 10.2 FL
PMV BLD AUTO: 10.9 FL
POCT GLUCOSE: 106 MG/DL (ref 70–110)
POCT GLUCOSE: 95 MG/DL (ref 70–110)
POTASSIUM SERPL-SCNC: 5.2 MMOL/L
PROTHROMBIN TIME: 12.7 SEC
RBC # BLD AUTO: 3.33 M/UL
RBC # BLD AUTO: 3.4 M/UL
SODIUM SERPL-SCNC: 140 MMOL/L
WBC # BLD AUTO: 4.22 K/UL
WBC # BLD AUTO: 5.19 K/UL

## 2018-04-04 PROCEDURE — 85025 COMPLETE CBC W/AUTO DIFF WBC: CPT

## 2018-04-04 PROCEDURE — 80048 BASIC METABOLIC PNL TOTAL CA: CPT

## 2018-04-04 PROCEDURE — G0378 HOSPITAL OBSERVATION PER HR: HCPCS

## 2018-04-04 PROCEDURE — 63600175 PHARM REV CODE 636 W HCPCS: Performed by: STUDENT IN AN ORGANIZED HEALTH CARE EDUCATION/TRAINING PROGRAM

## 2018-04-04 PROCEDURE — 85520 HEPARIN ASSAY: CPT

## 2018-04-04 PROCEDURE — 99225 PR SUBSEQUENT OBSERVATION CARE,LEVEL II: CPT | Mod: GC,,, | Performed by: HOSPITALIST

## 2018-04-04 PROCEDURE — 25000003 PHARM REV CODE 250: Performed by: STUDENT IN AN ORGANIZED HEALTH CARE EDUCATION/TRAINING PROGRAM

## 2018-04-04 PROCEDURE — 36415 COLL VENOUS BLD VENIPUNCTURE: CPT

## 2018-04-04 PROCEDURE — 85610 PROTHROMBIN TIME: CPT

## 2018-04-04 PROCEDURE — 85520 HEPARIN ASSAY: CPT | Mod: 91

## 2018-04-04 RX ORDER — ENOXAPARIN SODIUM 150 MG/ML
1.5 INJECTION SUBCUTANEOUS DAILY
Qty: 2.4 ML | Refills: 0 | Status: SHIPPED | OUTPATIENT
Start: 2018-04-05 | End: 2018-04-08

## 2018-04-04 RX ORDER — ENOXAPARIN SODIUM 100 MG/ML
75 INJECTION SUBCUTANEOUS ONCE
Status: COMPLETED | OUTPATIENT
Start: 2018-04-04 | End: 2018-04-04

## 2018-04-04 RX ADMIN — CARVEDILOL 12.5 MG: 12.5 TABLET, FILM COATED ORAL at 08:04

## 2018-04-04 RX ADMIN — SODIUM POLYSTYRENE SULFONATE 15 G: 15 SUSPENSION ORAL; RECTAL at 08:04

## 2018-04-04 RX ADMIN — OMEGA-3-ACID ETHYL ESTERS 2 G: 1 CAPSULE, LIQUID FILLED ORAL at 09:04

## 2018-04-04 RX ADMIN — ALLOPURINOL 100 MG: 100 TABLET ORAL at 08:04

## 2018-04-04 RX ADMIN — CLOTRIMAZOLE AND BETAMETHASONE DIPROPIONATE: 10; .5 CREAM TOPICAL at 09:04

## 2018-04-04 RX ADMIN — PANTOPRAZOLE SODIUM 40 MG: 40 TABLET, DELAYED RELEASE ORAL at 04:04

## 2018-04-04 RX ADMIN — ENOXAPARIN SODIUM 80 MG: 100 INJECTION SUBCUTANEOUS at 04:04

## 2018-04-04 RX ADMIN — FERROUS SULFATE TAB EC 325 MG (65 MG FE EQUIVALENT) 325 MG: 325 (65 FE) TABLET DELAYED RESPONSE at 08:04

## 2018-04-04 RX ADMIN — ATORVASTATIN CALCIUM 80 MG: 20 TABLET, FILM COATED ORAL at 08:04

## 2018-04-04 RX ADMIN — ISOSORBIDE MONONITRATE 120 MG: 120 TABLET ORAL at 08:04

## 2018-04-04 RX ADMIN — NIFEDIPINE 90 MG: 30 TABLET, FILM COATED, EXTENDED RELEASE ORAL at 08:04

## 2018-04-04 RX ADMIN — PANTOPRAZOLE SODIUM 40 MG: 40 TABLET, DELAYED RELEASE ORAL at 06:04

## 2018-04-04 RX ADMIN — FENOFIBRATE 200 MG: 200 CAPSULE ORAL at 08:04

## 2018-04-04 RX ADMIN — WARFARIN SODIUM 5 MG: 5 TABLET ORAL at 04:04

## 2018-04-04 NOTE — DISCHARGE SUMMARY
[facility] Hospital Medicine  Discharge Summary      Patient Name: Dariel Urbina  MRN: [id number]  Admission Date: 4/1/2018  Hospital Length of Stay: 0 days  Discharge Date and Time:  04/04/2018 3:58 PM  Attending Physician: Abdi Watson MD   Discharging Provider: Liz Mata DO  Primary Care Provider: Devon Langston MD  [facility] Medicine Team: [facility] 3 Liz Mata DO    HPI:   Patient is a 77-year-old man with diabetes, hypertension, CAD, HLD, gout and mechanical aortic valve who presents with bloody stool.  Patient was recently admitted for diarrhea that was watery, and non-bloody. During his admission the patient developed an JIM, and with fluid replacement he developed hyperkalemia, which when being corrected the patient developed an NSTEMI. [facility] determined no acute intervention was needed but patient is instructed to follow up. Patient also had a nose bleed during admission and was found to have an elevated INR of 4.4, but that came down to 1.6 and the patient had ASA, Plavix during hospitalization and was discharged with Lovenox and coumadin. At the end of hospitalization, the patient had normal BM. After hospitalization, patient had 2 days of no BM, followed by 3-5 loose stools on Saturday. Today the patient notes that he had a loose bowel movement and noted pinkish water in the bowl after word. At 1 pm the patient had a second BM, in which he and his wife noted the same pinkish red bloody color in the bowl as before. Patient's wife was concerned and brought him to the hospital. Patient had been taking 1 lovenox per day for the last 3 days, and coumadin and was supposed to present to the coumadin clinic tomorrow. Pt states that he had 1 BM while in ED that was not bloody, although this was not witnessed by others. Pt states that he has had diarrhea for months now, losing around 30lb from November until now. He has not seen a GI physician for workup of  diarrhea. Pt denies abdo pain, chest pain, SOB, dizziness, lightheadedness, fatigue, leg swelling.      Procedure(s) (LRB):  COLONOSCOPY (N/A)      Hospital Course:   Mr. Urbina was admitted to hospital medicine on 4/1 for bloody bowel movements. On 4/2, he had an observed maroon bowel movement which was FOBT positive. Colorectal surgery was consulted, who requested that GI be consulted as well. Patient underwent colonoscopy on 4/3 which revealed bleeding diverticulum, which was clipped, as well as two polyps (not resected) and hemorrhoids. Bloody stools stopped on 4/4 and the patient was discharged home, with instructions to resume lovenox bridge to warfarin and follow up in 3 months for repeat colonoscopy.      Consults:   Consults         Status Ordering Provider     Inpatient consult to Gastroenterology  Once     Provider:  (Not yet assigned)    TOYA Patino          * Gastrointestinal hemorrhage associated with intestinal diverticulosis    DIVERTICULOSIS    Maroon stool produced on 4/2 which was guiac positive  Patient is on chronic anticoagulation for mechanical aortic valve, had been bridging to coumadin on lovenox at home  Continued on heparin gtt on admission  Suspect lower GI bleed based on hemodynamic stability and unchanging hemoglobin (from one week ago)  Per colonoscopy report: Hemorrhoids found on perianal exam. Blood in the rectum and in the sigmoid colon. Diverticulosis in the sigmoid colon. There was active bleeding coming from the diverticular opening. Clip (MR conditional) was placed. One 4mm polyp in descending colon and one 10mm polyp at splenic flexure, not resected    - bloody stools stopped on 4/4  - continue lovenox -> warfarin as an outpatient  - will need to follow up in 3 months for repeat colonoscopy        H/O mechanical aortic valve replacement    - Patient is asymptomatic and stable  - Stopped home meds- patient was being bridged from lovenox to coumadin, but  due to bleeding the patient was placed on heparin drip for short half life            CKD (chronic kidney disease) stage 3, GFR 30-59 ml/min      Baseline creatinine 1.6-1.9  Creatinine 1.6 on 4/2        Metabolic acidosis with normal anion gap and bicarbonate losses              Long term current use of anticoagulant therapy    - see above            Final Active Diagnoses:    Diagnosis Date Noted POA    PRINCIPAL PROBLEM:  Gastrointestinal hemorrhage associated with intestinal diverticulosis [K57.91] 04/01/2018 Yes    H/O mechanical aortic valve replacement [Z95.2] 09/17/2014 Not Applicable    CKD (chronic kidney disease) stage 3, GFR 30-59 ml/min [N18.3] 05/27/2015 Yes    Internal hemorrhoid [K64.8] 04/03/2018 Yes    Type 2 diabetes mellitus with stage 3 chronic kidney disease, without long-term current use of insulin [E11.22, N18.3] 10/02/2013 Yes    Long term current use of anticoagulant therapy [Z79.01] 09/26/2012 Not Applicable    Hyperlipidemia [E78.5] 09/26/2012 Yes    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    Why:  1:00pm with Dr Miranda  Contact information:  [address]  [place] [address] [phone]                 Patient Instructions:     Diet Cardiac     Activity as tolerated         Significant Diagnostic Studies: Labs:   CMP   Recent Labs  Lab 04/03/18  0446 04/04/18  0544    140   K 4.5 5.2*    110   CO2 21* 24   GLU 86 82   BUN 32* 24*   CREATININE 1.4 1.4   CALCIUM 9.7 9.6   ANIONGAP 9 6*   ESTGFRAFRICA 56.0* 56.0*   EGFRNONAA 48.5* 48.5*   , CBC   Recent Labs  Lab 04/03/18  0446 04/03/18  1653 04/04/18  0544   WBC  5.42 4.53 4.22   HGB 11.0* 10.1* 10.0*   HCT 34.0* 31.2* 30.3*    169 174    and INR   Lab Results   Component Value Date    INR 1.3 (H) 04/04/2018    INR 1.5 (H) 04/03/2018    INR 1.7 (H) 04/02/2018       Pending Diagnostic Studies:     Procedure Component Value Units Date/Time    Occult blood x 1, stool [[id number]] Collected:  04/02/18 1146    Order Status:  Sent Lab Status:  In process Updated:  04/02/18 1147    Specimen:  Stool from Stool          Medications:  Reconciled Home Medications:      Medication List      CONTINUE taking these medications    allopurinol 100 MG tablet  Commonly known as:  ZYLOPRIM  TAKE 1 TABLET ONE TIME DAILY     atorvastatin 80 MG tablet  Commonly known as:  LIPITOR  TAKE 1 TABLET ONE TIME DAILY     carvedilol 6.25 MG tablet  Commonly known as:  COREG  Take 2 tablets (12.5 mg total) by mouth 2 (two) times daily.     clotrimazole-betamethasone 1-0.05% cream  Commonly known as:  LOTRISONE  Apply topically once daily. in between the 4th and 5th toes left foot.     enoxaparin 120 mg/0.8 mL Syrg  Commonly known as:  LOVENOX  Inject 0.8 mLs (120 mg total) into the skin once daily.  Start taking on:  4/5/2018     fenofibrate micronized 200 MG Cap  Commonly known as:  LOFIBRA     ferrous sulfate 325 (65 FE) MG EC tablet  Take 1 tablet (325 mg total) by mouth once daily.     furosemide 20 MG tablet  Commonly known as:  LASIX  TAKE 1 TABLET ONE TIME DAILY     glimepiride 1 MG tablet  Commonly known as:  AMARYL  Take 1 tablet (1 mg total) by mouth every morning.     isosorbide mononitrate 120 MG 24 hr tablet  Commonly known as:  IMDUR  Take 1 tablet (120 mg total) by mouth once daily.     NIFEdipine 30 MG (OSM) 24 hr tablet  Commonly known as:  PROCARDIA-XL  Take 2 tablets (60 mg total) by mouth once daily.     nitroGLYCERIN 0.4 MG SL tablet  Commonly known as:  NITROSTAT  Place 1 tablet (0.4 mg total) under the tongue every 5 (five) minutes as needed. As needed for chest pain     omega-3  acid ethyl esters 1 gram capsule  Commonly known as:  LOVAZA  Take 2 capsules (2 g total) by mouth 2 (two) times daily.     ranitidine 300 MG tablet  Commonly known as:  ZANTAC  Take 1 tablet (300 mg total) by mouth every evening.     warfarin 5 MG tablet  Commonly known as:  COUMADIN  Take 1 tablet (5 mg total) by mouth Daily. Current Dose (7.5 mg on Mon, Wed, Fri; 5 mg all other days)           Where to Get Your Medications      These medications were sent to [facility] - [place], [place] - [address], [place] [address]    Phone:  [phone]   · enoxaparin 120 mg/0.8 mL Syrg         Indwelling Lines/Drains at time of discharge:   Lines/Drains/Airways          No matching active lines, drains, or airways          Time spent on the discharge of patient: 30 minutes  Patient was seen and examined on the date of discharge and determined to be suitable for discharge.         Liz Mata DO  Department of Hospital Medicine  [facility]

## 2018-04-04 NOTE — SUBJECTIVE & OBJECTIVE
Interval History: Patient reports bloody BM x 2 this morning, described as dark red stool. He continues to deny chest pain, SOB, or lightheadedness/weakness.     Review of Systems   Constitutional: Negative for chills and fever.   Respiratory: Negative for cough, shortness of breath and wheezing.    Cardiovascular: Negative for chest pain and palpitations.   Gastrointestinal: Positive for blood in stool and diarrhea. Negative for abdominal pain, constipation, nausea and vomiting.   Genitourinary: Negative for dysuria and hematuria.   Musculoskeletal: Negative for back pain and neck pain.   Skin: Negative for color change and rash.   Neurological: Negative for light-headedness and headaches.     Objective:     Vital Signs (Most Recent):  Temp: 98.1 °F (36.7 °C) (04/04/18 1156)  Pulse: 88 (04/04/18 1156)  Resp: 20 (04/04/18 1156)  BP: (!) 117/57 (04/04/18 1156)  SpO2: (!) 94 % (04/04/18 1156) Vital Signs (24h Range):  Temp:  [97.6 °F (36.4 °C)-98.1 °F (36.7 °C)] 98.1 °F (36.7 °C)  Pulse:  [49-88] 88  Resp:  [18-20] 20  SpO2:  [93 %-97 %] 94 %  BP: (100-145)/(48-65) 117/57     Weight: 75.8 kg (167 lb 1.7 oz)  Body mass index is 26.97 kg/m².    Intake/Output Summary (Last 24 hours) at 04/04/18 1455  Last data filed at 04/04/18 1150   Gross per 24 hour   Intake              780 ml   Output                0 ml   Net              780 ml      Physical Exam   Constitutional: He is oriented to person, place, and time. He appears well-developed and well-nourished. No distress.   HENT:   Head: Normocephalic and atraumatic.   Mouth/Throat: Oropharynx is clear and moist.   Eyes: Conjunctivae and EOM are normal. No scleral icterus.   Neck: Normal range of motion. Neck supple.   Cardiovascular: Normal rate and regular rhythm.    Murmur heard.  Audible click   Pulmonary/Chest: Effort normal and breath sounds normal. No respiratory distress. He has no wheezes. He has no rales.   Abdominal: Soft. Bowel sounds are normal. He exhibits  no distension. There is no tenderness. There is no guarding.   Musculoskeletal: Normal range of motion. He exhibits no edema.   Neurological: He is alert and oriented to person, place, and time.   Skin: Skin is warm and dry. No rash noted. He is not diaphoretic.   Psychiatric: He has a normal mood and affect. His behavior is normal.       Significant Labs:   CBC:   Recent Labs  Lab 04/03/18  0446 04/03/18  1653 04/04/18  0544   WBC 5.42 4.53 4.22   HGB 11.0* 10.1* 10.0*   HCT 34.0* 31.2* 30.3*    169 174     CMP:   Recent Labs  Lab 04/03/18  0446 04/04/18  0544    140   K 4.5 5.2*    110   CO2 21* 24   GLU 86 82   BUN 32* 24*   CREATININE 1.4 1.4   CALCIUM 9.7 9.6   ANIONGAP 9 6*   EGFRNONAA 48.5* 48.5*       Significant Imaging: I have reviewed all pertinent imaging results/findings within the past 24 hours.

## 2018-04-04 NOTE — PLAN OF CARE
Problem: Patient Care Overview  Goal: Plan of Care Review  Outcome: Ongoing (interventions implemented as appropriate)   04/04/18 9656   Coping/Psychosocial   Plan Of Care Reviewed With patient     Patient anti-xa @ 0023 was 0.91 decreased gtt by 2 current heparin rate 0.3ml/hr next anti-xa draw put in for 0740

## 2018-04-04 NOTE — PROGRESS NOTES
[facility] Hospital Medicine  Progress Note    Patient Name: Dariel Urbina  MRN: [id number]  Patient Class: OP- Observation   Admission Date: 4/1/2018  Length of Stay: 0 days  Attending Physician: Abdi Watson MD  Primary Care Provider: Devon Langston MD    [facility] Medicine Team: [facility] MED 3 Liz Doloreslee Mata DO    Subjective:     Principal Problem:<principal problem not specified>    HPI:  Patient is a 77-year-old man with diabetes, hypertension, CAD, HLD, gout and mechanical aortic valve who presents with bloody stool.  Patient was recently admitted for diarrhea that was watery, and non-bloody. During his admission the patient developed an JIM, and with fluid replacement he developed hyperkalemia, which when being corrected the patient developed an NSTEMI. [facility] determined no acute intervention was needed but patient is instructed to follow up. Patient also had a nose bleed during admission and was found to have an elevated INR of 4.4, but that came down to 1.6 and the patient had ASA, Plavix during hospitalization and was discharged with Lovenox and coumadin. At the end of hospitalization, the patient had normal BM. After hospitalization, patient had 2 days of no BM, followed by 3-5 loose stools on Saturday. Today the patient notes that he had a loose bowel movement and noted pinkish water in the bowl after word. At 1 pm the patient had a second BM, in which he and his wife noted the same pinkish red bloody color in the bowl as before. Patient's wife was concerned and brought him to the hospital. Patient had been taking 1 lovenox per day for the last 3 days, and coumadin and was supposed to present to the coumadin clinic tomorrow. Pt states that he had 1 BM while in ED that was not bloody, although this was not witnessed by others. Pt states that he has had diarrhea for months now, losing around 30lb from November until now. He has not seen a GI physician for workup of  diarrhea. Pt denies abdo pain, chest pain, SOB, dizziness, lightheadedness, fatigue, leg swelling.      Hospital Course:  Mr. Urbina was admitted to hospital medicine on 4/1 for bloody bowel movements. On 4/2, he had an observed maroon bowel movement which was FOBT positive. Colorectal surgery was consulted, who requested that GI be consulted as well. Patient underwent colonoscopy on 4/3 which revealed bleeding diverticulum, which was clipped, as well as two polyps (not resected) and hemorrhoids.     Interval History: Patient reports bloody BM x 2 this morning, described as dark red stool. He continues to deny chest pain, SOB, or lightheadedness/weakness.     Review of Systems   Constitutional: Negative for chills and fever.   Respiratory: Negative for cough, shortness of breath and wheezing.    Cardiovascular: Negative for chest pain and palpitations.   Gastrointestinal: Positive for blood in stool and diarrhea. Negative for abdominal pain, constipation, nausea and vomiting.   Genitourinary: Negative for dysuria and hematuria.   Musculoskeletal: Negative for back pain and neck pain.   Skin: Negative for color change and rash.   Neurological: Negative for light-headedness and headaches.     Objective:     Vital Signs (Most Recent):  Temp: 98.1 °F (36.7 °C) (04/04/18 1156)  Pulse: 88 (04/04/18 1156)  Resp: 20 (04/04/18 1156)  BP: (!) 117/57 (04/04/18 1156)  SpO2: (!) 94 % (04/04/18 1156) Vital Signs (24h Range):  Temp:  [97.6 °F (36.4 °C)-98.1 °F (36.7 °C)] 98.1 °F (36.7 °C)  Pulse:  [49-88] 88  Resp:  [18-20] 20  SpO2:  [93 %-97 %] 94 %  BP: (100-145)/(48-65) 117/57     Weight: 75.8 kg (167 lb 1.7 oz)  Body mass index is 26.97 kg/m².    Intake/Output Summary (Last 24 hours) at 04/04/18 1455  Last data filed at 04/04/18 1150   Gross per 24 hour   Intake              780 ml   Output                0 ml   Net              780 ml      Physical Exam   Constitutional: He is oriented to person, place, and time. He appears  well-developed and well-nourished. No distress.   HENT:   Head: Normocephalic and atraumatic.   Mouth/Throat: Oropharynx is clear and moist.   Eyes: Conjunctivae and EOM are normal. No scleral icterus.   Neck: Normal range of motion. Neck supple.   Cardiovascular: Normal rate and regular rhythm.    Murmur heard.  Audible click   Pulmonary/Chest: Effort normal and breath sounds normal. No respiratory distress. He has no wheezes. He has no rales.   Abdominal: Soft. Bowel sounds are normal. He exhibits no distension. There is no tenderness. There is no guarding.   Musculoskeletal: Normal range of motion. He exhibits no edema.   Neurological: He is alert and oriented to person, place, and time.   Skin: Skin is warm and dry. No rash noted. He is not diaphoretic.   Psychiatric: He has a normal mood and affect. His behavior is normal.       Significant Labs:   CBC:   Recent Labs  Lab 04/03/18  0446 04/03/18  1653 04/04/18  0544   WBC 5.42 4.53 4.22   HGB 11.0* 10.1* 10.0*   HCT 34.0* 31.2* 30.3*    169 174     CMP:   Recent Labs  Lab 04/03/18  0446 04/04/18  0544    140   K 4.5 5.2*    110   CO2 21* 24   GLU 86 82   BUN 32* 24*   CREATININE 1.4 1.4   CALCIUM 9.7 9.6   ANIONGAP 9 6*   EGFRNONAA 48.5* 48.5*       Significant Imaging: I have reviewed all pertinent imaging results/findings within the past 24 hours.    Assessment/Plan:      Gastrointestinal hemorrhage associated with intestinal diverticulosis    DIVERTICULOSIS    Maroon stool produced on 4/2 which was guiac positive  Patient is on chronic anticoagulation for mechanical aortic valve, had been bridging to coumadin on lovenox at home  Continued on heparin gtt on admission  Suspect lower GI bleed based on hemodynamic stability and unchanging hemoglobin (from one week ago)  Per colonoscopy report: Hemorrhoids found on perianal exam. Blood in the rectum and in the sigmoid colon. Diverticulosis in the sigmoid colon. There was active bleeding coming  from the diverticular opening. Clip (MR conditional) was placed. One 4mm polyp in descending colon and one 10mm polyp at splenic flexure, not resected    - continue oral PPI  - clear liquid diet  - heparin gtt for anticoagulation  - CBC bid  - will resume coumadin on 4/4  - will need to follow up in 3 months for repeat colonoscopy  - will monitor for continued bloody stools        H/O mechanical aortic valve replacement    - Patient is asymptomatic and stable  - Stopped home meds- patient was being bridged from lovenox to coumadin, but due to bleeding the patient was placed on heparin drip for short half life            CKD (chronic kidney disease) stage 3, GFR 30-59 ml/min      Baseline creatinine 1.6-1.9  Creatinine 1.6 on 4/2        CAD (coronary atherosclerotic disease)    HYPERTENSION  HYPERLIPIDEMIA    - Patient is asymptomatic- without chest pain, SOB  - Currently not on ASA or plavix due to recurrent nosebleeds  - Maintain HTN and HLD medications  - Continue to monitor            Type 2 diabetes mellitus with stage 3 chronic kidney disease, without long-term current use of insulin    A1c 5.4 on recent admission, not on any antihyperglycemics  - monitor with daily CMP              Renovascular hypertension    - Patient is asymptomatic and stable- elevated BP upon admission 203/84, down to 160's/70s- patient did not have his evening doses of BP medication  - Treating with home meds- coreg, Imdur, nifedipine  - holding lasix given loose stools            Hyperlipidemia    - Patient is asymptomatic and stable  - Treating with home meds- lipitor and fenofibrate  - Continue to monitor          Long term current use of anticoagulant therapy    - see above            VTE Risk Mitigation         Ordered     warfarin (COUMADIN) tablet 5 mg  Every Mon, Wed, Fri     Route:  Oral        04/03/18 1607     warfarin tablet 7.5 mg  Every Tues, Thurs, Sat, Sun     Route:  Oral        04/03/18 1607     heparin 25,000 units in  dextrose 5% 250 mL (100 units/mL) infusion  Continuous     Route:  Intravenous        04/02/18 1312     heparin 25,000 units in dextrose 5% 250 mL (100 units/mL) bolus from bag; ADDITIONAL PRN BOLUS  As needed (PRN)     Route:  Intravenous        04/02/18 1034     heparin 25,000 units in dextrose 5% 250 mL (100 units/mL) bolus from bag; ADDITIONAL PRN BOLUS  As needed (PRN)     Route:  Intravenous        04/02/18 1034              Liz Mata DO  Department of Hospital Medicine   [facility]

## 2018-04-04 NOTE — ASSESSMENT & PLAN NOTE
DIVERTICULOSIS    Maroon stool produced on 4/2 which was guiac positive  Patient is on chronic anticoagulation for mechanical aortic valve, had been bridging to coumadin on lovenox at home  Continued on heparin gtt on admission  Suspect lower GI bleed based on hemodynamic stability and unchanging hemoglobin (from one week ago)  Per colonoscopy report: Hemorrhoids found on perianal exam. Blood in the rectum and in the sigmoid colon. Diverticulosis in the sigmoid colon. There was active bleeding coming from the diverticular opening. Clip (MR conditional) was placed. One 4mm polyp in descending colon and one 10mm polyp at splenic flexure, not resected    - bloody stools stopped on 4/4  - continue lovenox -> warfarin as an outpatient  - will need to follow up in 3 months for repeat colonoscopy

## 2018-04-05 ENCOUNTER — ANTI-COAG VISIT (OUTPATIENT)
Dept: CARDIOLOGY | Facility: CLINIC | Age: 78
End: 2018-04-05

## 2018-04-05 DIAGNOSIS — Z95.2 H/O MECHANICAL AORTIC VALVE REPLACEMENT: ICD-10-CM

## 2018-04-05 DIAGNOSIS — Z79.01 LONG TERM CURRENT USE OF ANTICOAGULANT THERAPY: Primary | ICD-10-CM

## 2018-04-05 NOTE — PROGRESS NOTES
"The pt was recently admitted from 4/1 through 4/4 for bloody stool.  Per discharge note: "Patient underwent colonoscopy on 4/3 which revealed bleeding diverticulum, which was clipped, as well as two polyps (not resected) and hemorrhoids. Bloody stools stopped on 4/4 and the patient was discharged home, with instructions to resume lovenox bridge to warfarin and follow up in 3 months for repeat colonoscopy."  INR upon admit was 1.6.  No Vit K was given.  See calendar for recent INRs and Warfarin doses.  He was discharged with Lovenox 120mg Q24hrs.  "

## 2018-04-06 ENCOUNTER — ANTI-COAG VISIT (OUTPATIENT)
Dept: CARDIOLOGY | Facility: CLINIC | Age: 78
End: 2018-04-06
Payer: MEDICARE

## 2018-04-06 DIAGNOSIS — Z95.2 H/O MECHANICAL AORTIC VALVE REPLACEMENT: ICD-10-CM

## 2018-04-06 DIAGNOSIS — Z79.01 LONG TERM CURRENT USE OF ANTICOAGULANT THERAPY: Primary | ICD-10-CM

## 2018-04-06 LAB — INR PPP: 1.6 (ref 2–3)

## 2018-04-06 PROCEDURE — 99211 OFF/OP EST MAY X REQ PHY/QHP: CPT | Mod: 25,S$GLB,,

## 2018-04-06 PROCEDURE — 85610 PROTHROMBIN TIME: CPT | Mod: QW,S$GLB,,

## 2018-04-06 NOTE — PROGRESS NOTES
Quick follow up from hospital admission from 3/30-4/4. Patient reports he had bloody stool and had a bleeding diverticulum. Patient reports no more bleeding. Reports bruising to the arms from use. Will stop lovenox injections and resume with weekly dose because patient had a extra boost yesterday. Follow up in 3 days for close monitoring. Advised patient to call with any changes or concerns.

## 2018-04-06 NOTE — PROGRESS NOTES
I have reviewed the nurse's initial findings and agree with their assessment.  I have personally spoken with and assessed the patient in clinic to devise care plan.

## 2018-04-09 ENCOUNTER — LAB VISIT (OUTPATIENT)
Dept: LAB | Facility: HOSPITAL | Age: 78
End: 2018-04-09
Attending: INTERNAL MEDICINE
Payer: MEDICARE

## 2018-04-09 ENCOUNTER — ANTI-COAG VISIT (OUTPATIENT)
Dept: CARDIOLOGY | Facility: CLINIC | Age: 78
End: 2018-04-09

## 2018-04-09 ENCOUNTER — OFFICE VISIT (OUTPATIENT)
Dept: PRIMARY CARE CLINIC | Facility: CLINIC | Age: 78
End: 2018-04-09
Payer: MEDICARE

## 2018-04-09 VITALS
HEART RATE: 58 BPM | DIASTOLIC BLOOD PRESSURE: 58 MMHG | OXYGEN SATURATION: 98 % | WEIGHT: 180.56 LBS | BODY MASS INDEX: 30.08 KG/M2 | SYSTOLIC BLOOD PRESSURE: 112 MMHG | HEIGHT: 65 IN

## 2018-04-09 DIAGNOSIS — N18.30 ANEMIA OF CHRONIC RENAL FAILURE, STAGE 3 (MODERATE): ICD-10-CM

## 2018-04-09 DIAGNOSIS — Z95.2 H/O MECHANICAL AORTIC VALVE REPLACEMENT: ICD-10-CM

## 2018-04-09 DIAGNOSIS — N18.30 TYPE 2 DIABETES MELLITUS WITH STAGE 3 CHRONIC KIDNEY DISEASE, WITHOUT LONG-TERM CURRENT USE OF INSULIN: ICD-10-CM

## 2018-04-09 DIAGNOSIS — K57.91 GASTROINTESTINAL HEMORRHAGE ASSOCIATED WITH INTESTINAL DIVERTICULOSIS: Primary | ICD-10-CM

## 2018-04-09 DIAGNOSIS — E11.51 TYPE 2 DIABETES MELLITUS WITH DIABETIC PERIPHERAL ANGIOPATHY WITHOUT GANGRENE, WITHOUT LONG-TERM CURRENT USE OF INSULIN: ICD-10-CM

## 2018-04-09 DIAGNOSIS — Z79.01 LONG TERM CURRENT USE OF ANTICOAGULANT THERAPY: Primary | ICD-10-CM

## 2018-04-09 DIAGNOSIS — Z79.01 LONG TERM CURRENT USE OF ANTICOAGULANT THERAPY: ICD-10-CM

## 2018-04-09 DIAGNOSIS — I25.810 ATHEROSCLEROSIS OF CORONARY ARTERY BYPASS GRAFT OF NATIVE HEART WITHOUT ANGINA PECTORIS: ICD-10-CM

## 2018-04-09 DIAGNOSIS — Z87.39 HISTORY OF GOUT: ICD-10-CM

## 2018-04-09 DIAGNOSIS — E87.20 METABOLIC ACIDOSIS WITH NORMAL ANION GAP AND BICARBONATE LOSSES: ICD-10-CM

## 2018-04-09 DIAGNOSIS — E78.2 MIXED HYPERLIPIDEMIA: ICD-10-CM

## 2018-04-09 DIAGNOSIS — E11.22 TYPE 2 DIABETES MELLITUS WITH STAGE 3 CHRONIC KIDNEY DISEASE, WITHOUT LONG-TERM CURRENT USE OF INSULIN: ICD-10-CM

## 2018-04-09 DIAGNOSIS — N18.30 CKD (CHRONIC KIDNEY DISEASE) STAGE 3, GFR 30-59 ML/MIN: ICD-10-CM

## 2018-04-09 DIAGNOSIS — N25.81 HYPERPARATHYROIDISM DUE TO RENAL INSUFFICIENCY: ICD-10-CM

## 2018-04-09 DIAGNOSIS — I15.0 RENOVASCULAR HYPERTENSION: ICD-10-CM

## 2018-04-09 DIAGNOSIS — D63.1 ANEMIA OF CHRONIC RENAL FAILURE, STAGE 3 (MODERATE): ICD-10-CM

## 2018-04-09 DIAGNOSIS — I73.9 PVD (PERIPHERAL VASCULAR DISEASE): ICD-10-CM

## 2018-04-09 PROBLEM — K64.8 INTERNAL HEMORRHOID: Status: RESOLVED | Noted: 2018-04-03 | Resolved: 2018-04-09

## 2018-04-09 PROBLEM — R04.0 BLEEDING NOSE: Status: RESOLVED | Noted: 2018-03-21 | Resolved: 2018-04-09

## 2018-04-09 PROBLEM — I21.4 NSTEMI (NON-ST ELEVATED MYOCARDIAL INFARCTION): Status: RESOLVED | Noted: 2018-03-21 | Resolved: 2018-04-09

## 2018-04-09 LAB
INR PPP: 1.7
PROTHROMBIN TIME: 17.2 SEC

## 2018-04-09 PROCEDURE — 99999 PR PBB SHADOW E&M-EST. PATIENT-LVL III: CPT | Mod: PBBFAC,,, | Performed by: INTERNAL MEDICINE

## 2018-04-09 PROCEDURE — 85610 PROTHROMBIN TIME: CPT

## 2018-04-09 PROCEDURE — 36415 COLL VENOUS BLD VENIPUNCTURE: CPT

## 2018-04-09 PROCEDURE — 99499 UNLISTED E&M SERVICE: CPT | Mod: S$PBB,,, | Performed by: INTERNAL MEDICINE

## 2018-04-09 PROCEDURE — 99495 TRANSJ CARE MGMT MOD F2F 14D: CPT | Mod: S$GLB,,, | Performed by: INTERNAL MEDICINE

## 2018-04-09 PROCEDURE — 99499 UNLISTED E&M SERVICE: CPT | Mod: S$GLB,,, | Performed by: INTERNAL MEDICINE

## 2018-04-09 RX ORDER — CARVEDILOL 12.5 MG/1
12.5 TABLET ORAL 2 TIMES DAILY
Qty: 180 TABLET | Refills: 4 | Status: SHIPPED | OUTPATIENT
Start: 2018-04-09 | End: 2019-10-21 | Stop reason: SDUPTHER

## 2018-04-09 RX ORDER — ASPIRIN 81 MG/1
81 TABLET ORAL DAILY
Refills: 0 | Status: ON HOLD | COMMUNITY
Start: 2018-04-09 | End: 2021-10-04

## 2018-04-09 RX ORDER — NIFEDIPINE 60 MG/1
60 TABLET, EXTENDED RELEASE ORAL DAILY
Qty: 90 TABLET | Refills: 4 | Status: SHIPPED | OUTPATIENT
Start: 2018-04-09 | End: 2019-07-22 | Stop reason: SDUPTHER

## 2018-04-09 NOTE — PROGRESS NOTES
[facility]  New Visit Progress Note   Recent Hospital Discharge     PRESENTING HISTORY     Chief Complaint/Reason for Visit:  Follow up Hospital Discharge   Chief Complaint   Patient presents with    Hospital Follow Up     PCP: Devon Langston MD    History of Present Illness: Mr. Dariel Urbina is a 77 y.o. male who was recently admitted to the hospital.    Admission Date: 4/1/2018  Hospital Length of Stay: 0 days  Discharge Date and Time:  04/04/2018 3:58 PM  Attending Physician: Abdi Watson MD   Discharging Provider: Liz Mata DO  Primary Care Provider: Devon Langston MD  [facility] Medicine Team: [facility] 3 Liz Mata DO     HPI:   Patient is a 77-year-old man with diabetes, hypertension, CAD, HLD, gout and mechanical aortic valve who presents with bloody stool.  Patient was recently admitted for diarrhea that was watery, and non-bloody. During his admission the patient developed an JIM, and with fluid replacement he developed hyperkalemia, which when being corrected the patient developed an NSTEMI. [facility] determined no acute intervention was needed but patient is instructed to follow up. Patient also had a nose bleed during admission and was found to have an elevated INR of 4.4, but that came down to 1.6 and the patient had ASA, Plavix during hospitalization and was discharged with Lovenox and coumadin. At the end of hospitalization, the patient had normal BM. After hospitalization, patient had 2 days of no BM, followed by 3-5 loose stools on Saturday. Today the patient notes that he had a loose bowel movement and noted pinkish water in the bowl after word. At 1 pm the patient had a second BM, in which he and his wife noted the same pinkish red bloody color in the bowl as before. Patient's wife was concerned and brought him to the hospital. Patient had been taking 1 lovenox per day for the last 3 days, and coumadin and was supposed to present to the coumadin clinic  tomorrow. Pt states that he had 1 BM while in ED that was not bloody, although this was not witnessed by others. Pt states that he has had diarrhea for months now, losing around 30lb from November until now. He has not seen a GI physician for workup of diarrhea. Pt denies abdo pain, chest pain, SOB, dizziness, lightheadedness, fatigue, leg swelling.       Procedure(s) (LRB):  COLONOSCOPY (N/A)       Hospital Course:   Mr. Urbina was admitted to hospital medicine on 4/1 for bloody bowel movements. On 4/2, he had an observed maroon bowel movement which was FOBT positive. Colorectal surgery was consulted, who requested that GI be consulted as well. Patient underwent colonoscopy on 4/3 which revealed bleeding diverticulum, which was clipped, as well as two polyps (not resected) and hemorrhoids. Bloody stools stopped on 4/4 and the patient was discharged home, with instructions to resume lovenox bridge to warfarin and follow up in 3 months for repeat colonoscopy.       Consults:          Consults          Status Ordering Provider       Inpatient consult to Gastroenterology  Once     Provider:  (Not yet assigned)    TOYA Patino                 * Gastrointestinal hemorrhage associated with intestinal diverticulosis     DIVERTICULOSIS     Maroon stool produced on 4/2 which was guiac positive  Patient is on chronic anticoagulation for mechanical aortic valve, had been bridging to coumadin on lovenox at home  Continued on heparin gtt on admission  Suspect lower GI bleed based on hemodynamic stability and unchanging hemoglobin (from one week ago)  Per colonoscopy report: Hemorrhoids found on perianal exam. Blood in the rectum and in the sigmoid colon. Diverticulosis in the sigmoid colon. There was active bleeding coming from the diverticular opening. Clip (MR conditional) was placed. One 4mm polyp in descending colon and one 10mm polyp at splenic flexure, not resected  - bloody stools stopped on 4/4  -  continue lovenox -> warfarin as an outpatient  - will need to follow up in 3 months for repeat colonoscopy          H/O mechanical aortic valve replacement     - Patient is asymptomatic and stable  - Stopped home meds- patient was being bridged from lovenox to coumadin, but due to bleeding the patient was placed on heparin drip for short half life       CKD (chronic kidney disease) stage 3, GFR 30-59 ml/min        Baseline creatinine 1.6-1.9  Creatinine 1.6 on 4/2         Metabolic acidosis with normal anion gap and bicarbonate losses            Long term current use of anticoagulant therapy     - see above          Final Active Diagnoses:     Diagnosis Date Noted POA    PRINCIPAL PROBLEM:  Gastrointestinal hemorrhage associated with intestinal diverticulosis [K57.91] 04/01/2018 Yes    H/O mechanical aortic valve replacement [Z95.2] 09/17/2014 Not Applicable    CKD (chronic kidney disease) stage 3, GFR 30-59 ml/min [N18.3] 05/27/2015 Yes    Internal hemorrhoid [K64.8] 04/03/2018 Yes    Type 2 diabetes mellitus with stage 3 chronic kidney disease, without long-term current use of insulin [E11.22, N18.3] 10/02/2013 Yes    Long term current use of anticoagulant therapy [Z79.01] 09/26/2012 Not Applicable    Hyperlipidemia [E78.5] 09/26/2012 Yes    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  No chest pain. No SOB. No diarrhea.      PAST HISTORY:     Past Medical History:   Diagnosis Date    Anemia of chronic renal failure, stage 3 (moderate) 5/27/2015    Atherosclerosis of coronary artery bypass graft of native heart without angina pectoris 9/11/2012    3-27-18 [facility] Two vessel coronary artery disease.   Prosthetic  aortic valve.   Porcelain aorta.   Patent LIMA graft.    Bilateral carotid artery disease 2/9/2017    Bleeding nose 3/21/2018    Cataract     CKD (chronic kidney disease) stage 3, GFR 30-59 ml/min 5/27/2015    Claudication of left lower extremity 9/17/2014    Gastroesophageal reflux disease without esophagitis 3/19/2018    Gastrointestinal hemorrhage associated with intestinal diverticulosis 4/1/2018    H/O mechanical aortic valve replacement 9/17/2014    History of gout 9/26/2012    Hyperparathyroidism due to renal insufficiency 7/27/2015    Internal hemorrhoid 4/3/2018    Long term current use of anticoagulant therapy 9/26/2012    Mechanical heart valve present     Metabolic acidosis with normal anion gap and bicarbonate losses 3/20/2018    Mixed hyperlipidemia 9/26/2012    NSTEMI (non-ST elevated myocardial infarction) 3/21/2018    Obesity, diabetes, and hypertension syndrome 2/23/2016    PVD (peripheral vascular disease) 9/11/2012    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/2012    Type 2 diabetes mellitus with diabetic peripheral angiopathy without gangrene 5/27/2015    Type 2 diabetes mellitus with stage 3 chronic kidney disease, without long-term current use of insulin 10/2/2013       Past Surgical History:   Procedure Laterality Date    CARDIAC CATHETERIZATION      CARDIAC VALVE REPLACEMENT      CARDIAC VALVE SURGERY      COLON SURGERY      COLONOSCOPY N/A 3/31/2017    Procedure: COLONOSCOPY;  Surgeon: Bruno Raymond MD;  [facility]  Service: Endoscopy;  Laterality: N/A;  Patient's wife requesting date.    COLONOSCOPY N/A 4/3/2018    Procedure: COLONOSCOPY;  Surgeon: Bonifacio Pelletier MD;  Location: [facility] ENDO ([facility]);  Service: Endoscopy;  Laterality: N/A;    CORONARY ANGIOPLASTY      CORONARY ARTERY BYPASS GRAFT      HERNIA REPAIR      SPINE SURGERY      VASECTOMY         Family History   Problem Relation Age of Onset    Heart failure Mother     Heart disease  Mother     Heart failure Father     Heart disease Father     Alcohol abuse Father     Heart failure Brother     Heart disease Brother     Diabetes Brother     No Known Problems Sister     No Known Problems Maternal Grandmother     No Known Problems Maternal Grandfather     No Known Problems Paternal Grandmother     No Known Problems Paternal Grandfather     Heart disease Sister     No Known Problems Maternal Aunt     No Known Problems Maternal Uncle     No Known Problems Paternal Aunt     No Known Problems Paternal Uncle     Amblyopia Neg Hx     Blindness Neg Hx     Cancer Neg Hx     Cataracts Neg Hx     Glaucoma Neg Hx     Hypertension Neg Hx     Macular degeneration Neg Hx     Retinal detachment Neg Hx     Strabismus Neg Hx     Stroke Neg Hx     Thyroid disease Neg Hx     Anemia Neg Hx     Arrhythmia Neg Hx     Asthma Neg Hx     Clotting disorder Neg Hx     Fainting Neg Hx     Heart attack Neg Hx     Hyperlipidemia Neg Hx     Atrial Septal Defect Neg Hx     Melanoma Neg Hx        Social History     Social History    Marital status:      Spouse name: Ameena    Number of children: 3    Years of education: N/A     Occupational History    retired      Social History Main Topics    Smoking status: Former Smoker     Packs/day: 1.00     Years: 20.00     Quit date: 9/11/1980    Smokeless tobacco: Never Used    Alcohol use No    Drug use: No    Sexual activity: No     Other Topics Concern    None     Social History Narrative    None       MEDICATIONS & ALLERGIES:     Current Outpatient Prescriptions on File Prior to Visit   Medication Sig Dispense Refill    allopurinol (ZYLOPRIM) 100 MG tablet TAKE 1 TABLET ONE TIME DAILY 90 tablet 4    atorvastatin (LIPITOR) 80 MG tablet TAKE 1 TABLET ONE TIME DAILY 90 tablet 3    carvedilol (COREG) 6.25 MG tablet Take 2 tablets (12.5 mg total) by mouth 2 (two) times daily. 60 tablet 3    clotrimazole-betamethasone 1-0.05%  (LOTRISONE) cream Apply topically once daily. in between the 4th and 5th toes left foot. 45 g 1    [] enoxaparin (LOVENOX) 120 mg/0.8 mL Syrg Inject 0.8 mLs (120 mg total) into the skin once daily. 2.4 mL 0    fenofibrate micronized (LOFIBRA) 200 MG Cap Take 200 mg by mouth daily with breakfast.      ferrous sulfate 325 (65 FE) MG EC tablet Take 1 tablet (325 mg total) by mouth once daily. 90 tablet 4    furosemide (LASIX) 20 MG tablet TAKE 1 TABLET ONE TIME DAILY 90 tablet 4    glimepiride (AMARYL) 1 MG tablet Take 1 tablet (1 mg total) by mouth every morning. 90 tablet 2    isosorbide mononitrate (IMDUR) 120 MG 24 hr tablet Take 1 tablet (120 mg total) by mouth once daily. 90 tablet 3    NIFEdipine (PROCARDIA-XL) 30 MG (OSM) 24 hr tablet Take 2 tablets (60 mg total) by mouth once daily. 60 tablet 4    nitroGLYCERIN (NITROSTAT) 0.4 MG SL tablet Place 1 tablet (0.4 mg total) under the tongue every 5 (five) minutes as needed. As needed for chest pain 90 tablet 4    omega-3 acid ethyl esters (LOVAZA) 1 gram capsule Take 2 capsules (2 g total) by mouth 2 (two) times daily. 360 capsule 5    ranitidine (ZANTAC) 300 MG tablet Take 1 tablet (300 mg total) by mouth every evening. 90 tablet 3    warfarin (COUMADIN) 5 MG tablet Take 1 tablet (5 mg total) by mouth Daily. Current Dose (7.5 mg on Mon, Wed, Fri; 5 mg all other days) 120 tablet 3     No current facility-administered medications on file prior to visit.         Review of patient's allergies indicates:   Allergen Reactions    Fosinopril      Intolerance- elevates potassium level      Losartan      Intolerance- elevates potassium level       OBJECTIVE:     Vital Signs:  Vitals:    18   BP: (!) 112/58   Pulse: (!) 58     Wt Readings from Last 1 Encounters:   18 81.9 kg (180 lb 8.9 oz)     Body mass index is 30.05 kg/m².     Physical Exam:  General: Well developed, well nourished. No distress.  HEENT: Head is normocephalic,  atraumatic   Eyes: Clear conjunctiva.  Neck: Supple, symmetrical neck; trachea midline.  Lungs: Clear to auscultation bilaterally and normal respiratory effort.  Cardiovascular: Heart with regular rate and rhythm.    Extremities: No LE edema   Abdomen: Abdomen is soft, non-tender non-distended with normal bowel sounds.  Skin: Skin color, texture, turgor normal. No rashes.  Musculoskeletal: Normal gait.   Neurologic: Normal strength and tone. No focal numbness or weakness.   Psychiatric: Normal affect. Alert.    Laboratory  Lab Results   Component Value Date    WBC 5.19 04/04/2018    HGB 10.2 (L) 04/04/2018    HCT 31.6 (L) 04/04/2018    MCV 93 04/04/2018     04/04/2018     BMP  Lab Results   Component Value Date     04/04/2018    K 5.2 (H) 04/04/2018     04/04/2018    CO2 24 04/04/2018    BUN 24 (H) 04/04/2018    CREATININE 1.4 04/04/2018    CALCIUM 9.6 04/04/2018    ANIONGAP 6 (L) 04/04/2018    ESTGFRAFRICA 56.0 (A) 04/04/2018    EGFRNONAA 48.5 (A) 04/04/2018     Lab Results   Component Value Date    ALT 55 (H) 04/01/2018    AST 62 (H) 04/01/2018    ALKPHOS 69 04/01/2018    BILITOT 0.3 04/01/2018     Lab Results   Component Value Date    INR 1.6 04/06/2018    INR 1.3 (H) 04/04/2018    INR 1.5 (H) 04/03/2018     Lab Results   Component Value Date    HGBA1C 5.4 04/01/2018       TRANSITION OF CARE:     [facility] On Call Contact Note: 3-30-18    Family and/or Caretaker present at visit?  Yes.  Diagnostic tests reviewed/disposition: No diagnosic tests pending after this hospitalization.  Disease/illness education: CAD, Diverticulosis.  Home health/community services discussion/referrals: Patient does not have home health established from hospital visit.  They do not need home health.  If needed, we will set up home health for the patient.   Establishment or re-establishment of referral orders for community resources: No other necessary community resources.   Discussion with other health care providers:  No discussion with other health care providers necessary.     Transition of Care Visit:     I have reviewed and updated the history and problem list.  I have reconciled the medication list.  I have discussed the hospitalization and current medical issues, prognosis and plans with the patient/family.  I  spent more than 50% of time discussing the care with the patient/family.  Total Encounter in the [facility]: 60 minutes.    Medications Reconciliation:   I have reconciled the patient's home medications and discharge medications with the patient/family. I have updated all changes.  Refer to After-Visit Medication List.    ASSESSMENT & PLAN:     Gastrointestinal hemorrhage associated with intestinal diverticulosis  - Had clipping for acute diverticular bleeding.  No bleeding since.    Need future follow up with colorectal surgery for polyps.    Atherosclerosis of coronary artery bypass graft of native heart without angina pectoris  - Likely need PCI of LMCA via femoral route per Cardiology.    Has follow up later this month.  - On Coreg, Imdur and Procardia for BP and antianginal.  - Restart aspirin:  -     aspirin (ECOTRIN) 81 MG EC tablet; Take 1 tablet (81 mg total) by mouth once daily.; Refill: 0    H/O mechanical aortic valve replacement  Long term current use of anticoagulant therapy  - On Warfarin. Managed by Coumadin Clinic.    Metabolic acidosis with normal anion gap and bicarbonate losses  CKD (chronic kidney disease) stage 3, GFR 30-59 ml/min  Hyperparathyroidism due to renal insufficiency  Anemia of chronic renal failure, stage 3 (moderate)  - Had recent acute renal failure due to dehydration from diarrhea.    Resolved.  - Has mild hyperkalemia.  Off ACE-I and ARB.    Discussed low potassium diet.    Type 2 diabetes mellitus with stage 3 chronic kidney disease, without long-term current use of insulin  Type 2 diabetes mellitus with diabetic peripheral angiopathy without gangrene, without long-term  current use of insulin  - A1c 5.4 on oral Amaryl 1 mg daily.    Renovascular hypertension  - BP controlled.    Refilled:  -     carvedilol (COREG) 12.5 MG tablet; Take 1 tablet (12.5 mg total) by mouth 2 (two) times daily.  Dispense: 180 tablet; Refill: 4  -     NIFEdipine (PROCARDIA-XL) 60 MG (OSM) 24 hr tablet; Take 1 tablet (60 mg total) by mouth once daily.  Dispense: 90 tablet; Refill: 4    PVD (peripheral vascular disease)  Mixed hyperlipidemia  - On high dose statin.    History of gout  - On allopurinol.    Instructions for the patient:    Low Potassium Diet.    Scheduled Follow-up :  Future Appointments  Date Time Provider Department Center   4/26/2018 7:30 AM LAB, SAME DAY [facility] LAB VNP [facility]   4/26/2018 8:00 AM ECHO, [facility] ECHOLAB [facility]   4/26/2018 8:40 AM José Miguel Vences MD [facility] CARDVAL Abdulkadir travis   5/9/2018 9:00 AM Devon Langston Jr., MD [facility] PCW       After Visit Medication List :     Medication List          Accurate as of 4/9/18 10:10 AM. If you have any questions, ask your nurse or doctor.               START taking these medications    aspirin 81 MG EC tablet  Commonly known as:  ECOTRIN  Take 1 tablet (81 mg total) by mouth once daily.  Started by:  Lamberto Miranda MD        CHANGE how you take these medications    carvedilol 12.5 MG tablet  Commonly known as:  COREG  Take 1 tablet (12.5 mg total) by mouth 2 (two) times daily.  What changed:  medication strength  Changed by:  Lamberto Miranda MD     NIFEdipine 60 MG (OSM) 24 hr tablet  Commonly known as:  PROCARDIA-XL  Take 1 tablet (60 mg total) by mouth once daily.  What changed:  medication strength  Changed by:  Lamberto Miranda MD        CONTINUE taking these medications    allopurinol 100 MG tablet  Commonly known as:  ZYLOPRIM  TAKE 1 TABLET ONE TIME DAILY     atorvastatin 80 MG tablet  Commonly known as:  LIPITOR  TAKE 1 TABLET ONE TIME DAILY     clotrimazole-betamethasone 1-0.05% cream  Commonly known as:   LOTRISONE  Apply topically once daily. in between the 4th and 5th toes left foot.     fenofibrate micronized 200 MG Cap  Commonly known as:  LOFIBRA     ferrous sulfate 325 (65 FE) MG EC tablet  Take 1 tablet (325 mg total) by mouth once daily.     furosemide 20 MG tablet  Commonly known as:  LASIX  TAKE 1 TABLET ONE TIME DAILY     glimepiride 1 MG tablet  Commonly known as:  AMARYL  Take 1 tablet (1 mg total) by mouth every morning.     isosorbide mononitrate 120 MG 24 hr tablet  Commonly known as:  IMDUR  Take 1 tablet (120 mg total) by mouth once daily.     nitroGLYCERIN 0.4 MG SL tablet  Commonly known as:  NITROSTAT  Place 1 tablet (0.4 mg total) under the tongue every 5 (five) minutes as needed. As needed for chest pain     omega-3 acid ethyl esters 1 gram capsule  Commonly known as:  LOVAZA  Take 2 capsules (2 g total) by mouth 2 (two) times daily.     warfarin 5 MG tablet  Commonly known as:  COUMADIN  Take 1 tablet (5 mg total) by mouth Daily. Current Dose (7.5 mg on Mon, Wed, Fri; 5 mg all other days)        STOP taking these medications    ranitidine 300 MG tablet  Commonly known as:  ZANTAC  Stopped by:  Lamberto Miranda MD           Where to Get Your Medications      These medications were sent to [facility] - [facility] [address]  [address], [place] [address]    Phone:  [phone]   · carvedilol 12.5 MG tablet  · NIFEdipine 60 MG (OSM) 24 hr tablet     You can get these medications from any pharmacy    You don't need a prescription for these medications  · aspirin 81 MG EC tablet           Signing Physician:  Lamberto Miranda MD

## 2018-04-09 NOTE — Clinical Note
[facility] Visit (Post Discharge Follow-up) Today:  - Our clinic physicians and nurses plan to follow the patient up for any medical issues in the [facility] for 30 days post discharge.  Future Appointments: 4/26/2018  7:30 AM    LAB, SAME DAY              [facility] LAB VNP   [facility]  4/26/2018  8:00 AM    ECHO, [facility] ECHOLAB   [facility] 4/26/2018  8:40 AM    José Miguel Vences MD      [facility] CARDVAL   [facility] 5/9/2018   9:00 AM    Devon Langston Jr., MD     [facility]

## 2018-04-09 NOTE — PATIENT INSTRUCTIONS
Discharge Instructions: Eating a Low-Potassium Diet  Your health care provider has prescribed a low-potassium diet for you. This kind of diet is advised for people who have certain kidney problems. Potassium is needed for muscle function. But too much potassium is a health risk. Potassium is found in many foods. Read below to find out how to change your diet.  Foods to limit  Some foods are high in potassium. Limit your daily intake of the foods in the list below.  · Fruits: apricots (canned and fresh), bananas, cantaloupe, honeydew melon, kiwi, nectarines, pomegranates, oranges, orange juice, pears, dried fruits (apricots, dates, figs, prunes), and prune juice  · Vegetables: asparagus, avocado, artichoke, bamboo shoots, beets, brussels sprouts, cabbage, celery, chard, okra, potatoes (white and sweet), pumpkin, rutabaga, spinach (cooked), squash, tomato, tomato sauce, tomato juice, and vegetable juice cocktail  · Legumes: black-eyed peas, chickpeas, lentils, lima beans, navy beans, red kidney beans, soybeans, and split peas  · Nuts and seeds: almonds, Brazil nuts, cashews, peanuts, peanut butter, pecans, pumpkin seeds, sunflower seeds, and walnuts  · Breads and cereals: bran and whole-grain products  · Dairy foods: milk, cheese, ice cream, yogurt  · Animal protein: all forms of animal protein  · Other: chocolate, cocoa, coconut milk, and molasses  Tips  · Ask your health care provider how much potassium you are allowed each day. This will help you figure out serving sizes for your needs.  · Check labels for potassium. It may be listed as potassium chloride.  · Do not use salt substitutes. These often have potassium in them.  · Cook frozen fruits and vegetables in water. Rinse and drain them well before eating.  · Drain liquid from all canned fruits and vegetables. Rinse them before eating.  · Reduce the potassium in potatoes. Peel them, slice thinly, and soak in water for at least 4 hours.  · Reduce the potassium  in green leafy vegetables. Soak them in water for at least 4 hours.  · Eat white rice and refined white flour products. These include white bread, pasta, and grits.  Follow-up  Make a follow-up appointment as advised by our staff.  When to call your health care provider  Call your health care provider right away if you have any of the following:  · Fatigue  · Shortness of breath  · Chest pain  · Slow, irregular heartbeat  · Fainting  · Dizziness  · Lightheadedness  · Confusion   Date Last Reviewed: 6/21/2015  © 2342-1779 [employer]. [address] [place] [address]. All rights reserved. This information is not intended as a substitute for professional medical care. Always follow your healthcare professional's instructions.

## 2018-04-11 ENCOUNTER — LAB VISIT (OUTPATIENT)
Dept: LAB | Facility: HOSPITAL | Age: 78
End: 2018-04-11
Attending: INTERNAL MEDICINE
Payer: MEDICARE

## 2018-04-11 ENCOUNTER — ANTI-COAG VISIT (OUTPATIENT)
Dept: CARDIOLOGY | Facility: CLINIC | Age: 78
End: 2018-04-11

## 2018-04-11 DIAGNOSIS — Z79.01 LONG TERM CURRENT USE OF ANTICOAGULANT THERAPY: ICD-10-CM

## 2018-04-11 DIAGNOSIS — Z95.2 H/O MECHANICAL AORTIC VALVE REPLACEMENT: ICD-10-CM

## 2018-04-11 LAB
INR PPP: 1.9
PROTHROMBIN TIME: 19.2 SEC

## 2018-04-11 PROCEDURE — 85610 PROTHROMBIN TIME: CPT

## 2018-04-11 PROCEDURE — 36415 COLL VENOUS BLD VENIPUNCTURE: CPT

## 2018-04-18 ENCOUNTER — ANTI-COAG VISIT (OUTPATIENT)
Dept: CARDIOLOGY | Facility: CLINIC | Age: 78
End: 2018-04-18
Payer: MEDICARE

## 2018-04-18 DIAGNOSIS — Z79.01 LONG TERM CURRENT USE OF ANTICOAGULANT THERAPY: Primary | ICD-10-CM

## 2018-04-18 DIAGNOSIS — Z95.2 H/O MECHANICAL AORTIC VALVE REPLACEMENT: ICD-10-CM

## 2018-04-18 LAB — INR PPP: 1.6 (ref 2–3)

## 2018-04-18 PROCEDURE — 85610 PROTHROMBIN TIME: CPT | Mod: QW,S$GLB,, | Performed by: INTERNAL MEDICINE

## 2018-04-18 PROCEDURE — 99211 OFF/OP EST MAY X REQ PHY/QHP: CPT | Mod: 25,S$GLB,, | Performed by: PHARMACIST

## 2018-04-18 NOTE — PROGRESS NOTES
Patient reports no bruising, no new medications and no diet changes.  He denied any missed doses of warfarin and reports no increased intake of vit K.  He actually reported an overall decrease in appetite.  He reported a brief nosebleed this morning, which has stopped.  As his INR is low again today, I am boosting his warfarin, increasing his weekly dose, asking him to resume Lovenox 120mg Q24hs and re-checking another INR in 2 days.  I reminded the patient to call with any problems, changes or questions before the next visit.  I have reviewed the student's initial findings and agree with their assessment.  I have personally spoken with and assessed the patient in clinic to devise care plan.

## 2018-04-20 ENCOUNTER — ANTI-COAG VISIT (OUTPATIENT)
Dept: CARDIOLOGY | Facility: CLINIC | Age: 78
End: 2018-04-20
Payer: MEDICARE

## 2018-04-20 DIAGNOSIS — Z95.2 H/O MECHANICAL AORTIC VALVE REPLACEMENT: ICD-10-CM

## 2018-04-20 DIAGNOSIS — Z79.01 LONG TERM CURRENT USE OF ANTICOAGULANT THERAPY: Primary | ICD-10-CM

## 2018-04-20 LAB — INR PPP: 1.7 (ref 2–3)

## 2018-04-20 PROCEDURE — 99211 OFF/OP EST MAY X REQ PHY/QHP: CPT | Mod: 25,S$GLB,, | Performed by: PHARMACIST

## 2018-04-20 PROCEDURE — 85610 PROTHROMBIN TIME: CPT | Mod: QW,S$GLB,, | Performed by: INTERNAL MEDICINE

## 2018-04-20 NOTE — PROGRESS NOTES
Patient is here for a quick follow up status post low INR two days ago on 4/18. INR still low. Patient confirms taking coumadin dose as prescribed. He denies any more nosebleeds and reports no blood in stool. He only has one injection of lovenox left and is refusing to get refills despite my recommendation to do so. Therefore, as he was due for a 5mg dose today, I will ask that he take 7.5mg today and then resume previous dose with repeat INR in 3 days. Patient was re-educated on situations that would require placing a call to the coumadin clinic, including bleeding or unusual bruising issues, changes in health, diet or medications, upcoming procedures that require warfarin interruption, and missed coumadin dose(s). Patient expressed understanding that avoidance of consistency with these parameters could cause fluctuations in INR, leading to more frequent visits and increase risk of adverse events.

## 2018-04-23 ENCOUNTER — ANTI-COAG VISIT (OUTPATIENT)
Dept: CARDIOLOGY | Facility: CLINIC | Age: 78
End: 2018-04-23
Payer: MEDICARE

## 2018-04-23 DIAGNOSIS — Z79.01 LONG TERM CURRENT USE OF ANTICOAGULANT THERAPY: Primary | ICD-10-CM

## 2018-04-23 DIAGNOSIS — Z95.2 H/O MECHANICAL AORTIC VALVE REPLACEMENT: ICD-10-CM

## 2018-04-23 LAB — INR PPP: 1.8 (ref 2–3)

## 2018-04-23 PROCEDURE — 85610 PROTHROMBIN TIME: CPT | Mod: QW,S$GLB,, | Performed by: INTERNAL MEDICINE

## 2018-04-23 PROCEDURE — 99211 OFF/OP EST MAY X REQ PHY/QHP: CPT | Mod: 25,S$GLB,, | Performed by: PHARMACIST

## 2018-04-23 NOTE — PROGRESS NOTES
Patient refusing more lovenox. INR did not move much since Friday despite boosted dose of coumadin. We will boost dose at this time and repeat INR in 3 days. He denies bleeding issues. Patient was re-educated on situations that would require placing a call to the coumadin clinic, including bleeding or unusual bruising issues, changes in health, diet or medications, upcoming procedures that require warfarin interruption, and missed coumadin dose(s). Patient expressed understanding that avoidance of consistency with these parameters could cause fluctuations in INR, leading to more frequent visits and increase risk of adverse events.

## 2018-04-26 ENCOUNTER — ANTI-COAG VISIT (OUTPATIENT)
Dept: CARDIOLOGY | Facility: CLINIC | Age: 78
End: 2018-04-26

## 2018-04-26 ENCOUNTER — OFFICE VISIT (OUTPATIENT)
Dept: CARDIOLOGY | Facility: CLINIC | Age: 78
End: 2018-04-26
Payer: MEDICARE

## 2018-04-26 ENCOUNTER — HOSPITAL ENCOUNTER (OUTPATIENT)
Dept: CARDIOLOGY | Facility: CLINIC | Age: 78
Discharge: HOME | End: 2018-04-26
Attending: INTERNAL MEDICINE
Payer: MEDICARE

## 2018-04-26 VITALS
OXYGEN SATURATION: 98 % | HEART RATE: 56 BPM | HEIGHT: 65 IN | BODY MASS INDEX: 30.81 KG/M2 | SYSTOLIC BLOOD PRESSURE: 158 MMHG | DIASTOLIC BLOOD PRESSURE: 71 MMHG | WEIGHT: 184.94 LBS

## 2018-04-26 DIAGNOSIS — Z79.01 LONG TERM CURRENT USE OF ANTICOAGULANT THERAPY: ICD-10-CM

## 2018-04-26 DIAGNOSIS — I73.9 PVD (PERIPHERAL VASCULAR DISEASE): ICD-10-CM

## 2018-04-26 DIAGNOSIS — N28.9 RENAL INSUFFICIENCY: ICD-10-CM

## 2018-04-26 DIAGNOSIS — Z98.890 HISTORY OF AORTIC VALVE REPAIR: ICD-10-CM

## 2018-04-26 DIAGNOSIS — Z86.79 HISTORY OF AORTIC VALVE REPAIR: ICD-10-CM

## 2018-04-26 DIAGNOSIS — I25.119 ATHEROSCLEROSIS OF NATIVE CORONARY ARTERY OF NATIVE HEART WITH ANGINA PECTORIS: ICD-10-CM

## 2018-04-26 DIAGNOSIS — Z95.2 H/O MECHANICAL AORTIC VALVE REPLACEMENT: Primary | ICD-10-CM

## 2018-04-26 DIAGNOSIS — Z95.2 H/O MECHANICAL AORTIC VALVE REPLACEMENT: ICD-10-CM

## 2018-04-26 LAB
AORTIC VALVE REGURGITATION: ABNORMAL
CORONARY STENOSIS: ABNORMAL
ESTIMATED PA SYSTOLIC PRESSURE: 48.7
MITRAL VALVE REGURGITATION: ABNORMAL
RETIRED EF AND QEF - SEE NOTES: 50 (ref 55–65)
TRICUSPID VALVE REGURGITATION: ABNORMAL

## 2018-04-26 PROCEDURE — 99499 UNLISTED E&M SERVICE: CPT | Mod: S$PBB,,, | Performed by: INTERNAL MEDICINE

## 2018-04-26 PROCEDURE — 99212 OFFICE O/P EST SF 10 MIN: CPT | Mod: S$GLB,,, | Performed by: INTERNAL MEDICINE

## 2018-04-26 PROCEDURE — 3077F SYST BP >= 140 MM HG: CPT | Mod: CPTII,S$GLB,, | Performed by: INTERNAL MEDICINE

## 2018-04-26 PROCEDURE — 3078F DIAST BP <80 MM HG: CPT | Mod: CPTII,S$GLB,, | Performed by: INTERNAL MEDICINE

## 2018-04-26 PROCEDURE — 93306 TTE W/DOPPLER COMPLETE: CPT | Mod: S$GLB,,, | Performed by: INTERNAL MEDICINE

## 2018-04-26 PROCEDURE — 99999 PR PBB SHADOW E&M-EST. PATIENT-LVL IV: CPT | Mod: PBBFAC,,, | Performed by: INTERNAL MEDICINE

## 2018-04-26 NOTE — PROGRESS NOTES
Subjective:    Patient ID:  Dariel Urbina is a 77 y.o. male who presents for follow-up of AS and CAD.  HPI bc has had rare fleeting chest pains.  Claudication Dianna IIb.    Review of Systems   Constitution: Negative for chills and fever.   HENT: Negative for nosebleeds.    Cardiovascular: Positive for chest pain and claudication. Negative for dyspnea on exertion, irregular heartbeat, leg swelling, near-syncope, orthopnea, palpitations and paroxysmal nocturnal dyspnea.   Respiratory: Negative for cough and shortness of breath.    Gastrointestinal: Negative for abdominal pain, constipation, diarrhea, heartburn, hematemesis, hematochezia and melena.        Objective:    Physical Exam   Constitutional: He is oriented to person, place, and time. He appears well-developed and well-nourished. No distress.   Neck: No JVD present.   Cardiovascular: Normal rate and regular rhythm.    Murmur heard.  Pulmonary/Chest: Effort normal and breath sounds normal.   Musculoskeletal: He exhibits no edema, tenderness or deformity.   Neurological: He is alert and oriented to person, place, and time.   Skin: Skin is warm and dry. He is not diaphoretic.         Assessment:       1. H/O mechanical aortic valve replacement    2. PVD (peripheral vascular disease)    3. Atherosclerosis of native coronary artery of native heart with angina pectoris    4. Long term current use of anticoagulant therapy    5. Renal insufficiency         Plan:       1.  Should proceed with colonoscopy.  2.  Routine follow up.

## 2018-04-26 NOTE — LETTER
April 26, 2018      Liz Mata, DO  [address]  [facility] [address]           [facility]  [address]  [facility] [address]  Phone: [phone]          Patient: Dariel Urbina   MR Number: [id number]   YOB: 1940   Date of Visit: 4/26/2018       Dear Dr. Liz Mata:    Thank you for referring Dariel Urbina to me for evaluation. Attached you will find relevant portions of my assessment and plan of care.    If you have questions, please do not hesitate to call me. I look forward to following Dariel Urbina along with you.    Sincerely,    José Miguel Vences MD    Enclosure  CC:  No Recipients    If you would like to receive this communication electronically, please contact [email or web address] or [phone] to request more information on [facility] access.    For providers and/or their staff who would like to refer a patient to [facility], please contact us through our one-stop-shop provider referral line, [facility], at [phone].    If you feel you have received this communication in error or would no longer like to receive these types of communications, please e-mail [email or web address]

## 2018-05-02 ENCOUNTER — ANTI-COAG VISIT (OUTPATIENT)
Dept: CARDIOLOGY | Facility: CLINIC | Age: 78
End: 2018-05-02
Payer: MEDICARE

## 2018-05-02 DIAGNOSIS — Z95.2 H/O MECHANICAL AORTIC VALVE REPLACEMENT: ICD-10-CM

## 2018-05-02 DIAGNOSIS — Z79.01 LONG TERM CURRENT USE OF ANTICOAGULANT THERAPY: Primary | ICD-10-CM

## 2018-05-02 LAB — INR PPP: 2 (ref 2–3)

## 2018-05-02 PROCEDURE — 85610 PROTHROMBIN TIME: CPT | Mod: QW,S$GLB,, | Performed by: INTERNAL MEDICINE

## 2018-05-02 NOTE — PROGRESS NOTES
Quick follow-up for dose change 4/26. INR within normal range today. Patient with bruises on body from use. Denies any bleeding or changes. Will maintain weekly dose until follow-up next week, will continue to monitor closely due to recent hospital stay. Advised him to call with any changes or concerns.

## 2018-05-09 ENCOUNTER — LAB VISIT (OUTPATIENT)
Dept: LAB | Facility: HOSPITAL | Age: 78
End: 2018-05-09
Attending: INTERNAL MEDICINE
Payer: MEDICARE

## 2018-05-09 ENCOUNTER — ANTI-COAG VISIT (OUTPATIENT)
Dept: CARDIOLOGY | Facility: CLINIC | Age: 78
End: 2018-05-09
Payer: MEDICARE

## 2018-05-09 ENCOUNTER — OFFICE VISIT (OUTPATIENT)
Dept: INTERNAL MEDICINE | Facility: CLINIC | Age: 78
End: 2018-05-09
Payer: MEDICARE

## 2018-05-09 VITALS
DIASTOLIC BLOOD PRESSURE: 58 MMHG | WEIGHT: 189.13 LBS | HEIGHT: 65 IN | BODY MASS INDEX: 31.51 KG/M2 | HEART RATE: 60 BPM | SYSTOLIC BLOOD PRESSURE: 130 MMHG

## 2018-05-09 DIAGNOSIS — Z95.2 H/O MECHANICAL AORTIC VALVE REPLACEMENT: ICD-10-CM

## 2018-05-09 DIAGNOSIS — N18.30 CKD (CHRONIC KIDNEY DISEASE) STAGE 3, GFR 30-59 ML/MIN: ICD-10-CM

## 2018-05-09 DIAGNOSIS — E78.2 MIXED HYPERLIPIDEMIA: ICD-10-CM

## 2018-05-09 DIAGNOSIS — K57.91 GASTROINTESTINAL HEMORRHAGE ASSOCIATED WITH INTESTINAL DIVERTICULOSIS: ICD-10-CM

## 2018-05-09 DIAGNOSIS — E11.22 TYPE 2 DIABETES MELLITUS WITH STAGE 3 CHRONIC KIDNEY DISEASE, WITHOUT LONG-TERM CURRENT USE OF INSULIN: ICD-10-CM

## 2018-05-09 DIAGNOSIS — Z79.01 LONG TERM CURRENT USE OF ANTICOAGULANT THERAPY: ICD-10-CM

## 2018-05-09 DIAGNOSIS — Z79.01 LONG TERM (CURRENT) USE OF ANTICOAGULANTS: Primary | ICD-10-CM

## 2018-05-09 DIAGNOSIS — D63.1 ANEMIA OF CHRONIC RENAL FAILURE, STAGE 3 (MODERATE): ICD-10-CM

## 2018-05-09 DIAGNOSIS — N18.30 ANEMIA OF CHRONIC RENAL FAILURE, STAGE 3 (MODERATE): ICD-10-CM

## 2018-05-09 DIAGNOSIS — I77.9 BILATERAL CAROTID ARTERY DISEASE: ICD-10-CM

## 2018-05-09 DIAGNOSIS — E11.22 TYPE 2 DIABETES MELLITUS WITH STAGE 3 CHRONIC KIDNEY DISEASE, WITHOUT LONG-TERM CURRENT USE OF INSULIN: Primary | ICD-10-CM

## 2018-05-09 DIAGNOSIS — N18.30 TYPE 2 DIABETES MELLITUS WITH STAGE 3 CHRONIC KIDNEY DISEASE, WITHOUT LONG-TERM CURRENT USE OF INSULIN: Primary | ICD-10-CM

## 2018-05-09 DIAGNOSIS — I71.9 AORTIC ANEURYSM WITHOUT RUPTURE, UNSPECIFIED PORTION OF AORTA: ICD-10-CM

## 2018-05-09 DIAGNOSIS — E11.51 TYPE 2 DIABETES MELLITUS WITH DIABETIC PERIPHERAL ANGIOPATHY WITHOUT GANGRENE, WITHOUT LONG-TERM CURRENT USE OF INSULIN: ICD-10-CM

## 2018-05-09 DIAGNOSIS — N18.30 TYPE 2 DIABETES MELLITUS WITH STAGE 3 CHRONIC KIDNEY DISEASE, WITHOUT LONG-TERM CURRENT USE OF INSULIN: ICD-10-CM

## 2018-05-09 LAB
ALBUMIN SERPL BCP-MCNC: 3.8 G/DL
ALP SERPL-CCNC: 65 U/L
ALT SERPL W/O P-5'-P-CCNC: 22 U/L
ANION GAP SERPL CALC-SCNC: 8 MMOL/L
AST SERPL-CCNC: 23 U/L
BASOPHILS # BLD AUTO: 0.03 K/UL
BASOPHILS NFR BLD: 0.5 %
BILIRUB SERPL-MCNC: 0.4 MG/DL
BUN SERPL-MCNC: 41 MG/DL
CALCIUM SERPL-MCNC: 9.8 MG/DL
CHLORIDE SERPL-SCNC: 112 MMOL/L
CO2 SERPL-SCNC: 22 MMOL/L
CREAT SERPL-MCNC: 1.3 MG/DL
DIFFERENTIAL METHOD: ABNORMAL
EOSINOPHIL # BLD AUTO: 0.1 K/UL
EOSINOPHIL NFR BLD: 1.5 %
ERYTHROCYTE [DISTWIDTH] IN BLOOD BY AUTOMATED COUNT: 14.7 %
EST. GFR  (AFRICAN AMERICAN): >60 ML/MIN/1.73 M^2
EST. GFR  (NON AFRICAN AMERICAN): 53 ML/MIN/1.73 M^2
GLUCOSE SERPL-MCNC: 95 MG/DL
HCT VFR BLD AUTO: 32.8 %
HGB BLD-MCNC: 10.7 G/DL
INR PPP: 1.8 (ref 2–3)
LYMPHOCYTES # BLD AUTO: 1.7 K/UL
LYMPHOCYTES NFR BLD: 29.7 %
MCH RBC QN AUTO: 30.5 PG
MCHC RBC AUTO-ENTMCNC: 32.6 G/DL
MCV RBC AUTO: 93 FL
MONOCYTES # BLD AUTO: 0.7 K/UL
MONOCYTES NFR BLD: 11.9 %
NEUTROPHILS # BLD AUTO: 3.3 K/UL
NEUTROPHILS NFR BLD: 56.2 %
PLATELET # BLD AUTO: 177 K/UL
PMV BLD AUTO: 11.2 FL
POTASSIUM SERPL-SCNC: 4.5 MMOL/L
PROT SERPL-MCNC: 7.1 G/DL
RBC # BLD AUTO: 3.51 M/UL
SODIUM SERPL-SCNC: 142 MMOL/L
WBC # BLD AUTO: 5.86 K/UL

## 2018-05-09 PROCEDURE — 99499 UNLISTED E&M SERVICE: CPT | Mod: S$GLB,,, | Performed by: INTERNAL MEDICINE

## 2018-05-09 PROCEDURE — 3078F DIAST BP <80 MM HG: CPT | Mod: CPTII,S$GLB,, | Performed by: INTERNAL MEDICINE

## 2018-05-09 PROCEDURE — 99999 PR PBB SHADOW E&M-EST. PATIENT-LVL IV: CPT | Mod: PBBFAC,,, | Performed by: INTERNAL MEDICINE

## 2018-05-09 PROCEDURE — 3075F SYST BP GE 130 - 139MM HG: CPT | Mod: CPTII,S$GLB,, | Performed by: INTERNAL MEDICINE

## 2018-05-09 PROCEDURE — 36415 COLL VENOUS BLD VENIPUNCTURE: CPT

## 2018-05-09 PROCEDURE — 85610 PROTHROMBIN TIME: CPT | Mod: QW,S$GLB,, | Performed by: INTERNAL MEDICINE

## 2018-05-09 PROCEDURE — 85025 COMPLETE CBC W/AUTO DIFF WBC: CPT

## 2018-05-09 PROCEDURE — 99214 OFFICE O/P EST MOD 30 MIN: CPT | Mod: S$GLB,,, | Performed by: INTERNAL MEDICINE

## 2018-05-09 PROCEDURE — 80053 COMPREHEN METABOLIC PANEL: CPT

## 2018-05-09 NOTE — PROGRESS NOTES
HPI    78 yo M here for follow up appointment for follow up of April hospital visit for GI bleed amd MI.    He has  diabetes, hypertension, CAD, HLD, gout and mechanical aortic valve who presented  with bloody stool.  Patient was recently admitted 4/2018) for diarrhea that was watery, and non-bloody. During his admission the patient developed an JIM, and with fluid replacement he developed hyperkalemia, which when being corrected the patient developed an NSTEMI. [facility] determined no acute intervention was needed ( saw Dr Vences for follow 4/26/18)  . Patient also had a nose bleed during admission and was found to have an elevated INR of 4.4, but that came down to 1.6 and the patient had ASA, Plavix during hospitalization and was discharged with Lovenox and coumadin. At the end of hospitalization, the patient had normal BM. . Pt denies abdo pain, chest pain, SOB, dizziness, lightheadedness, fatigue, leg swelling.  They want him to do C-scope but his wife wants to wait .        Diabetes mellitus type 2. Last A1c was 5.4 -- . He was put on amaryl 1mg last visit.  .  . Weight today is 189 # lbs- heavly dressed. He has lost 3 #. Patient denies polyuria, polydipsia, visual disturbances.         Hyperlipidemia. On lipitor 40 mg , lovaza. And Fenofibrate --Recent lipid panel showed CHol 143, Hdl 34, LDL 72.6, and .12/2017 He says he is taking all his cholesterol meds.         HTN. On monopril, lasix, imdur, lopressor. Pt does not measure BPs at home. BP today is 130/58. . HIs  ACE was stopped due to JIM and hyperkalemia. K=5.2.  He saw nephrology in Sept 2017.          CKD stage III. Pt saw Nephrology in 9/2017-- than note was reviewed. Cr back down to baseline of 1.6.  His potasium is   5.2. He has been instructed on a a low potasium diet by nephrology, but is not following one.  .  Possible secondary hyperparathyroidism (- in March 2018) ).         H/o partial colon resection due to diverticulosis. He has been  having diarrhea since,- hospitalized in March for JIM and diarrhea-- but now diarrhea has stopped.   --   NO N/V.  No recent travel.  No sick contacts.  NO recent antibiotics recently.       .    H/o CAD s/p CABG 2002, multiple stents, aortic valve replacement on coumadin. He saws he been having  chest pain for about 3 years.   . He saw Dr. Vences In recently and that note was reviewed--  . He has LÓPEZ but this is chronic and some left sided claudication.   The chest pain he says in in his throat and her takes NTG and it does not help.  It is not with exertion but is with lying down.  He says the pain he is having is a burning in the throat. It has not changed in 3 years.        AAA- last us Showed 3.35 cm AAA- (2/2017 )largest supra renal area.             H/o gout. On allopurinol. Last flare was 18 mo ago in left foot. He remains on Allopurinol.      Valvular heart disease- with mechanical AV, some MVR and some TVR-- NO He has jazzy eCP with walkin gout to mail box and his has discussed this with cards.  Reviewed recent PET stress test.   SOB with walking 50 feet- unchanged since seeing cards.  He also has some chest pain with carrying weight- he has discussed this with cards-- it is not changing.         Review of Systems    Constitutional: Negative for fever, chills and unexpected weight change.    HENT: Negative for congestion, rhinorrhea and sinus pressure.    Eyes: Negative for visual disturbance.    Respiratory: He Has some SOB, but this is unchanged. He can mow the lawn with walk behind mower.    This has not changed since last visit.    Cardiovascular:as above.  .    Gastrointestinal: Negative for nausea, vomiting, abdominal pain, and constipation.  He does have some diarrhea-  - as above.    Genitourinary: Negative for dysuria, urgency and difficulty urinating.    Musculoskeletal: He reports his back- that was bothering him last vsiit- is not bothering him recently.    Skin: Negative.    Neurological:  "Negative for dizziness, syncope and headaches.    Hematological: Negative.    Psychiatric/Behavioral: Negative.    All other systems reviewed and are negative.    Objective:      Physical Exam BP (!) 130/58 (BP Location: Right arm, Patient Position: Sitting, BP Method: Large (Manual))   Pulse 60   Ht 5' 5" (1.651 m)   Wt 85.8 kg (189 lb 2.5 oz)   BMI 31.48 kg/m²        Constitutional: He is oriented to person, place, and time. He appears well-developed and well-nourished. No distress.    HENT:    Head: Normocephalic and atraumatic.    Mouth/Throat: Oropharynx is clear and moist. No oropharyngeal exudate.    Eyes: Conjunctivae and EOM are normal.    Neck: Normal range of motion. Neck supple. No JVD present. No tracheal deviation present.    Cardiovascular: Regular rhythm and normal heart sounds. Exam reveals no gallop and no friction rub.    Systolic murmur heard.  Pulse is 60  Pulmonary/Chest: Effort normal and breath sounds normal. No respiratory distress. He has no wheezes. He has no rales.    Abdominal: Soft. Bowel sounds are normal. He exhibits no distension. There is no tenderness. There is no rebound.   Musculoskeletal: Normal range of motion. He exhibits no edema and no tenderness.   Neurological: He is alert and oriented to person, place, and time.    Skin: Skin is warm and dry. No rash noted. He is not diaphoretic. No erythema.   Psychiatric: He has a normal mood and affect. His behavior is normal.       .ffot Decrease sensation to Monofilament test on both feet more distally.          Assessment:      1.   DM (diabetes mellitus)     2.   Hyperlipidemia     3.   Chronic kidney disease, stage III (moderate)     4.   History of colon resection     5.   Hypertension     6.   CAD (coronary atherosclerotic disease)     7.    8.  9.  10.  History of aortic valve repair    AAA  [facility]- on coumadin   Colon polyps-- will refer back to GI.  Cards - ok'd getting c-scope      Plan:      1. GI bleed- resolved-- he " has 2 polyps that need to removed. -- will check labs and make referral back to GI   2. HLD. Lipid panel better since last visit. On lipitor, lovaza.  lipitor to 80 mg a day.   3. DM type 2 for stable-- Hgb A1c controled.  . - follow up in 6 months   4. CKD, stage III. Seeing nephrology. Counseled on importance of BP control. Cont BP meds. Discussed low potassium diet.    5. H/o partial colon resection. No complaints with N/V/D, constipation. c-scope due in 2020.    6. H/o CAD s/p CABG 2002, multiple stents, aortic valve replacement on coumadin. Cont coumadin and BP meds.  7. H/o gout.  Cont allopurinol.    8. hyperkalemia --this is better-- recheck in 4 months will need to follow  a low k diet  9.   HTN. Stable   10 will get AAA us for follow up

## 2018-05-09 NOTE — PROGRESS NOTES
INR low today. Patient states that he missed his dose 5/2, reminded him to please notify us of all missed doses. He has bruises from use, denies any bleeding or other changes. Will boost dose today and then retry weekly dose until follow-up next week. Advised him to call with any changes or concerns.

## 2018-05-10 ENCOUNTER — CLINICAL SUPPORT (OUTPATIENT)
Dept: CARDIOLOGY | Facility: CLINIC | Age: 78
End: 2018-05-10
Attending: INTERNAL MEDICINE
Payer: MEDICARE

## 2018-05-10 DIAGNOSIS — I71.9 AORTIC ANEURYSM WITHOUT RUPTURE, UNSPECIFIED PORTION OF AORTA: ICD-10-CM

## 2018-05-10 LAB — VASCULAR ABDOMINAL AORTIC ANEURYSM (AAA): 3.32

## 2018-05-10 PROCEDURE — 93978 VASCULAR STUDY: CPT | Mod: S$GLB,,, | Performed by: INTERNAL MEDICINE

## 2018-05-14 NOTE — ASSESSMENT & PLAN NOTE
Resolved        CARDIOLOGY NP PROGRESS NOTE    Subjective: Patient seen and examined at bedside. Patient states abdominal pain and nausea slightly improved, has been consistent for the past few days. Still c/o BACK and SOB at rest at times. Denies CP, dizziness/diaphoresis, vomiting, palpitations.  Remainder ROS otherwise negative.    Overnight Events: Patient had abdominal pain a/w nausea, EKG unchanged, Zofran administered.     TELEMETRY: Afib (80s-90s), 130s on exertion       VITAL SIGNS:  T(C): 36.6 (05-14-18 @ 10:30), Max: 37.3 (05-14-18 @ 05:11)  HR: 100 (05-14-18 @ 12:41) (78 - 100)  BP: 130/83 (05-14-18 @ 12:41) (112/62 - 163/67)  RR: 16 (05-14-18 @ 12:41) (16 - 16)  SpO2: 96% (05-14-18 @ 12:41) (94% - 98%)  Wt(kg): --    I&O's Summary    13 May 2018 07:01  -  14 May 2018 07:00  --------------------------------------------------------  IN: 828 mL / OUT: 3775 mL / NET: -2947 mL    14 May 2018 07:01  -  14 May 2018 12:51  --------------------------------------------------------  IN: 152 mL / OUT: 575 mL / NET: -423 mL          PHYSICAL EXAM:    General: A/ox 3, No acute Distress  Neck: Supple, NO JVD  Cardiac: S1 S2, No M/R/G  Pulmonary:  Diminished breath sounds throughout with Bibasilar crackles. No wheezing   Abdomen: Soft, Non -tender, +BS x 4 quads  Extremities: +3 pitting edema RLE/+2 pitting edema LLE extending to knees   Neuro: A/o x 3, No focal deficits          LABS:                          11.4   9.4   )-----------( 186      ( 14 May 2018 06:28 )             35.0                              05-14    130<L>  |  89<L>  |  50<H>  ----------------------------<  114<H>  3.4<L>   |  33<H>  |  1.67<H>    Ca    8.6      14 May 2018 06:28  Mg     2.1     05-14    TPro  5.8<L>  /  Alb  2.5<L>  /  TBili  0.3  /  DBili  <0.2  /  AST  24  /  ALT  14  /  AlkPhos  73  05-14    LIVER FUNCTIONS - ( 14 May 2018 06:28 )  Alb: 2.5 g/dL / Pro: 5.8 g/dL / ALK PHOS: 73 U/L / ALT: 14 U/L / AST: 24 U/L / GGT: x                                 PT/INR - ( 14 May 2018 06:28 )   PT: 11.9 sec;   INR: 1.07          PTT - ( 14 May 2018 06:28 )  PTT:60.8 sec  CAPILLARY BLOOD GLUCOSE      POCT Blood Glucose.: 122 mg/dL (13 May 2018 21:11)            Allergies:  metoprolol (Unknown)  penicillin (Rash)    MEDICATIONS  (STANDING):  albumin human 25% IVPB 50 milliLiter(s) IV Intermittent every 8 hours  aspirin  chewable 81 milliGRAM(s) Oral daily  atorvastatin 40 milliGRAM(s) Oral at bedtime  buMETAnide Injectable 2 milliGRAM(s) IV Push every 8 hours  carvedilol 6.25 milliGRAM(s) Oral every 12 hours  clopidogrel Tablet 75 milliGRAM(s) Oral daily  heparin  Infusion 700 Unit(s)/Hr (7 mL/Hr) IV Continuous <Continuous>  metolazone 5 milliGRAM(s) Oral daily    MEDICATIONS  (PRN):  acetaminophen   Tablet. 650 milliGRAM(s) Oral every 6 hours PRN Moderate Pain (4 - 6)  aluminum hydroxide/magnesium hydroxide/simethicone Suspension 30 milliLiter(s) Oral every 6 hours PRN Dyspepsia

## 2018-05-16 DIAGNOSIS — I25.10 CORONARY ARTERY DISEASE, ANGINA PRESENCE UNSPECIFIED, UNSPECIFIED VESSEL OR LESION TYPE, UNSPECIFIED WHETHER NATIVE OR TRANSPLANTED HEART: ICD-10-CM

## 2018-05-16 RX ORDER — ISOSORBIDE MONONITRATE 120 MG/1
TABLET, EXTENDED RELEASE ORAL
Qty: 90 TABLET | Refills: 4 | Status: SHIPPED | OUTPATIENT
Start: 2018-05-16 | End: 2019-06-13 | Stop reason: SDUPTHER

## 2018-05-17 ENCOUNTER — ANTI-COAG VISIT (OUTPATIENT)
Dept: CARDIOLOGY | Facility: CLINIC | Age: 78
End: 2018-05-17
Payer: MEDICARE

## 2018-05-17 DIAGNOSIS — Z95.2 H/O MECHANICAL AORTIC VALVE REPLACEMENT: ICD-10-CM

## 2018-05-17 DIAGNOSIS — Z79.01 LONG TERM CURRENT USE OF ANTICOAGULANT THERAPY: Primary | ICD-10-CM

## 2018-05-17 LAB — INR PPP: 1.2 (ref 2–3)

## 2018-05-17 PROCEDURE — 85610 PROTHROMBIN TIME: CPT | Mod: QW,S$GLB,, | Performed by: INTERNAL MEDICINE

## 2018-05-17 NOTE — PROGRESS NOTES
Quick follow-up for low INR 5/9. INR lower today. Patient states that he missed a dose 5/16. He has bruises from use, denies any bleeding or other changes. Will boost dose today and increase weekly dose until follow-up in 1 week. Advised him to call with any changes or concerns.

## 2018-05-17 NOTE — PROGRESS NOTES
Pt seen by Basia DUFFY. I have reviewed her documentation and agree with her assessment and plan.  Even though the pt reports missing one dose of his warfarin, his INR should not be THIS subtherapeutic.  Therefore, we are boosting and increasing his warfarin at this time.

## 2018-05-25 ENCOUNTER — ANTI-COAG VISIT (OUTPATIENT)
Dept: CARDIOLOGY | Facility: CLINIC | Age: 78
End: 2018-05-25
Payer: MEDICARE

## 2018-05-25 DIAGNOSIS — Z79.01 LONG TERM CURRENT USE OF ANTICOAGULANT THERAPY: Primary | ICD-10-CM

## 2018-05-25 DIAGNOSIS — Z95.2 H/O MECHANICAL AORTIC VALVE REPLACEMENT: ICD-10-CM

## 2018-05-25 LAB — INR PPP: 2.2 (ref 2–3)

## 2018-05-25 PROCEDURE — 85610 PROTHROMBIN TIME: CPT | Mod: QW,S$GLB,, | Performed by: INTERNAL MEDICINE

## 2018-05-25 PROCEDURE — 99211 OFF/OP EST MAY X REQ PHY/QHP: CPT | Mod: 25,S$GLB,,

## 2018-06-01 RX ORDER — WARFARIN SODIUM 5 MG/1
5 TABLET ORAL DAILY
Qty: 120 TABLET | Refills: 3 | Status: CANCELLED | OUTPATIENT
Start: 2018-06-01

## 2018-06-04 DIAGNOSIS — Z79.01 LONG TERM CURRENT USE OF ANTICOAGULANT THERAPY: ICD-10-CM

## 2018-06-04 DIAGNOSIS — Z95.2 H/O MECHANICAL AORTIC VALVE REPLACEMENT: ICD-10-CM

## 2018-06-04 RX ORDER — WARFARIN SODIUM 5 MG/1
7.5-1 TABLET ORAL DAILY
Qty: 150 TABLET | Refills: 3 | Status: SHIPPED | OUTPATIENT
Start: 2018-06-04 | End: 2019-05-23 | Stop reason: SDUPTHER

## 2018-06-04 RX ORDER — WARFARIN SODIUM 5 MG/1
5 TABLET ORAL DAILY
Qty: 120 TABLET | Refills: 3 | Status: SHIPPED | OUTPATIENT
Start: 2018-06-04 | End: 2018-06-04 | Stop reason: DRUGHIGH

## 2018-06-06 ENCOUNTER — ANTI-COAG VISIT (OUTPATIENT)
Dept: CARDIOLOGY | Facility: CLINIC | Age: 78
End: 2018-06-06
Payer: MEDICARE

## 2018-06-06 DIAGNOSIS — Z79.01 LONG TERM CURRENT USE OF ANTICOAGULANT THERAPY: Primary | ICD-10-CM

## 2018-06-06 DIAGNOSIS — Z95.2 H/O MECHANICAL AORTIC VALVE REPLACEMENT: ICD-10-CM

## 2018-06-06 LAB — INR PPP: 2.2 (ref 2–3)

## 2018-06-06 PROCEDURE — 85610 PROTHROMBIN TIME: CPT | Mod: QW,S$GLB,, | Performed by: INTERNAL MEDICINE

## 2018-06-06 NOTE — PROGRESS NOTES
INR within normal range today. Patient with bruises on body from use, denies any bleeding or changes. Patient appears stable on this dose. He will continue this dose until follow-up in 3 weeks. I advised him to contact us with any changes or problems.

## 2018-06-09 NOTE — PROGRESS NOTES
Patient seen by Basia DUFFY. I have reviewed her initial findings and agree with her assessment.

## 2018-06-15 DIAGNOSIS — M10.9 GOUT, UNSPECIFIED CAUSE, UNSPECIFIED CHRONICITY, UNSPECIFIED SITE: ICD-10-CM

## 2018-06-15 RX ORDER — ATORVASTATIN CALCIUM 80 MG/1
TABLET, FILM COATED ORAL
Qty: 90 TABLET | Refills: 3 | Status: SHIPPED | OUTPATIENT
Start: 2018-06-15 | End: 2018-06-18 | Stop reason: SDUPTHER

## 2018-06-15 RX ORDER — ALLOPURINOL 100 MG/1
TABLET ORAL
Qty: 90 TABLET | Refills: 0 | Status: SHIPPED | OUTPATIENT
Start: 2018-06-15 | End: 2018-06-25 | Stop reason: SDUPTHER

## 2018-06-18 DIAGNOSIS — E78.2 MIXED HYPERLIPIDEMIA: Primary | ICD-10-CM

## 2018-06-18 RX ORDER — ATORVASTATIN CALCIUM 80 MG/1
TABLET, FILM COATED ORAL
Qty: 90 TABLET | Refills: 3 | Status: SHIPPED | OUTPATIENT
Start: 2018-06-18 | End: 2018-10-24 | Stop reason: SDUPTHER

## 2018-06-22 ENCOUNTER — HOSPITAL ENCOUNTER (EMERGENCY)
Facility: HOSPITAL | Age: 78
Discharge: HOME | End: 2018-06-22
Attending: EMERGENCY MEDICINE
Payer: MEDICARE

## 2018-06-22 VITALS
RESPIRATION RATE: 18 BRPM | SYSTOLIC BLOOD PRESSURE: 141 MMHG | DIASTOLIC BLOOD PRESSURE: 65 MMHG | HEIGHT: 65 IN | OXYGEN SATURATION: 96 % | WEIGHT: 189 LBS | BODY MASS INDEX: 31.49 KG/M2 | HEART RATE: 65 BPM | TEMPERATURE: 98 F

## 2018-06-22 DIAGNOSIS — S00.82XA: Primary | ICD-10-CM

## 2018-06-22 LAB
APTT BLDCRRT: 30.8 SEC
INR PPP: 2.4
PROTHROMBIN TIME: 23.1 SEC

## 2018-06-22 PROCEDURE — 85610 PROTHROMBIN TIME: CPT

## 2018-06-22 PROCEDURE — 85730 THROMBOPLASTIN TIME PARTIAL: CPT

## 2018-06-22 PROCEDURE — 99283 EMERGENCY DEPT VISIT LOW MDM: CPT

## 2018-06-22 PROCEDURE — 99284 EMERGENCY DEPT VISIT MOD MDM: CPT | Mod: ,,, | Performed by: EMERGENCY MEDICINE

## 2018-06-22 NOTE — ED NOTES
Pt has a blood blister on his left cheek. Last night around 1930 while pt was in the shower the blister opened up. Pt has been unsuccessful in getting it to stop bleeding. Pt is on coumadin. Pt denies injury.

## 2018-06-22 NOTE — ED PROVIDER NOTES
"Encounter Date: 6/22/2018       History     Chief Complaint   Patient presents with    Bleeding     Pt presents to ED c/o bleeding from a "blood blister" on his face since around 1930 last night. Denies injury. Pt is on coumadin. + active bleeding       77-year-old male coming in today due to some bleeding from a blister from his left face.  Patient states he is on Coumadin due to a heart valve.  He is taking his medication as prescribed.  He last had his INR checked about 2 weeks ago and everything was within normal limits per patient.  States he has a checkup in 2 weeks from now.  Upon arrival to the room patient's holding firm pressure.  Appears that the bleeding has resolved at this time.  He denies any other acute issues.  No chest pain shortness of breath.  No weakness loss of consciousness.  States that he had some mild using throughout the night and this is the reason why he presented to the ER be seen and evaluated.  Denies any recent fevers chills.  No nausea vomiting diarrhea.  No headache vision changes.  He denies any other acute modifying factors at this time.          Review of patient's allergies indicates:   Allergen Reactions    Fosinopril      Intolerance- elevates potassium level      Losartan      Intolerance- elevates potassium level     Past Medical History:   Diagnosis Date    Anemia of chronic renal failure, stage 3 (moderate) 5/27/2015    Atherosclerosis of coronary artery bypass graft of native heart without angina pectoris 9/11/2012    3-27-18 [facility] Two vessel coronary artery disease.   Prosthetic aortic valve.   Porcelain aorta.   Patent LIMA graft.    Bilateral carotid artery disease 2/9/2017    Bleeding nose 3/21/2018    Cataract     CKD (chronic kidney disease) stage 3, GFR 30-59 ml/min 5/27/2015    Claudication of left lower extremity 9/17/2014    Gastroesophageal reflux disease without esophagitis 3/19/2018    Gastrointestinal hemorrhage associated with intestinal " diverticulosis 4/1/2018    H/O mechanical aortic valve replacement 9/17/2014    History of gout 9/26/2012    Hyperparathyroidism due to renal insufficiency 7/27/2015    Internal hemorrhoid 4/3/2018    Long term current use of anticoagulant therapy 9/26/2012    Mechanical heart valve present     Metabolic acidosis with normal anion gap and bicarbonate losses 3/20/2018    Mixed hyperlipidemia 9/26/2012    NSTEMI (non-ST elevated myocardial infarction) 3/21/2018    Obesity, diabetes, and hypertension syndrome 2/23/2016    PVD (peripheral vascular disease) 9/11/2012    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/2012    Type 2 diabetes mellitus with diabetic peripheral angiopathy without gangrene 5/27/2015    Type 2 diabetes mellitus with stage 3 chronic kidney disease, without long-term current use of insulin 10/2/2013     Past Surgical History:   Procedure Laterality Date    CARDIAC CATHETERIZATION      CARDIAC VALVE REPLACEMENT      CARDIAC VALVE SURGERY      COLON SURGERY      COLONOSCOPY N/A 3/31/2017    Procedure: COLONOSCOPY;  Surgeon: Bruno Raymond MD;  Location: [facility] ENDO (4TH FLR);  Service: Endoscopy;  Laterality: N/A;  Patient's wife requesting date.    COLONOSCOPY N/A 4/3/2018    Procedure: COLONOSCOPY;  Surgeon: Bonifacio Pelletier MD;  Location: [facility] ENDO (2ND FLR);  Service: Endoscopy;  Laterality: N/A;    CORONARY ANGIOPLASTY      CORONARY ARTERY BYPASS GRAFT      HERNIA REPAIR      SPINE SURGERY      VASECTOMY       Family History   Problem Relation Age of Onset    Heart failure Mother     Heart disease Mother     Heart failure Father     Heart disease Father     Alcohol abuse Father     Heart failure Brother     Heart disease Brother     Diabetes Brother     No Known Problems Sister     No Known Problems Maternal Grandmother     No Known Problems Maternal Grandfather     No Known Problems Paternal Grandmother     No Known Problems Paternal Grandfather     Heart disease  Sister     No Known Problems Maternal Aunt     No Known Problems Maternal Uncle     No Known Problems Paternal Aunt     No Known Problems Paternal Uncle     Amblyopia Neg Hx     Blindness Neg Hx     Cancer Neg Hx     Cataracts Neg Hx     Glaucoma Neg Hx     Hypertension Neg Hx     Macular degeneration Neg Hx     Retinal detachment Neg Hx     Strabismus Neg Hx     Stroke Neg Hx     Thyroid disease Neg Hx     Anemia Neg Hx     Arrhythmia Neg Hx     Asthma Neg Hx     Clotting disorder Neg Hx     Fainting Neg Hx     Heart attack Neg Hx     Hyperlipidemia Neg Hx     Atrial Septal Defect Neg Hx     Melanoma Neg Hx      Social History   Substance Use Topics    Smoking status: Former Smoker     Packs/day: 1.00     Years: 20.00     Quit date: 9/11/1980    Smokeless tobacco: Never Used    Alcohol use No     Review of Systems   Constitutional:        ----I have reviewed the PMHx, PSHx, FamHX, social hx, allergies and other nursing notes and agree with the nursing notes.         Constitutional: Negative for fever, negative for chills, negative for weight loss    Eyes: Negative for visual changes, negative for eye pain, negative for discharge.    ENMT: Negative for hearing changes, negative for ear pain, negative for ear discharge. negative for neck pain, negative for neck stiffness. negative for epistaxis, negative for rhinorrhea.    Cardiac: Negative for chest pain, negative for palpitations, negative for lower extremity edema.     Respiratory: Negative for cough, negative for shortness of breath.    GI: Negative for nausea, negative for vomiting, negative for diarrhea, negative for abdominal pain. negative for rectal bleeding.    : Negative for dysuria, negative for frequency, negative for hematuria.    MS: Negative for muscle weakness, negative for joint pain, negative for back pain.    Neuro: Negative for headache, negative for focal weakness. negative for LOC. Denies any trauma.    Skin:  Negative for skin rash. See hpi    Endocrine: Negative for polyuria, negative for polydypsia.       Physical Exam     Initial Vitals [06/22/18 0439]   BP Pulse Resp Temp SpO2   (!) 141/65 65 18 97.5 °F (36.4 °C) 96 %      MAP       --         Physical Exam    Constitutional:   Constitutional: A+Ox4, NAD    Eyes: PERRLA, EOMI, Negative for scleral erythema, Negative for eye discharge, Negative for periorbital swelling.    ENT: Tympanic membranes are normal in appearance without erythema bilaterally, Negative for external canal exudate and bleeding, Pharynx: Negative for erythema; no exudate noted; Negative for rhinorrhea.    Neck: Negative for neck stiffness, Negative for signs of meningismus.    Heart: RRR without M/G/R    Lungs: CTA-B, Negative for wheezes, stridor, or respiratory distress.    Abdomen: Soft, Bowel sounds x4, Negative for distension, Negative for tenderness in all 4 quadrants, Negative for guarding, Negative for rebound, Negative for rigidity, NOT a surgical acute abdomen.    Back: No costovertebral angle tenderness noted.    Skin: Warm, dry, intact.  Small superficial blister to the left anterior cheek.  No active bleeding at this time.  No signs of cellulitis erythema.  No signs of any other acute lesions.    MS: Full ROM in all ext.    Psych: Mood appropriate.    Neuro: 5/5 strength in all extremities. 2+ distal pulses and 2+ sensation throughout all extremities. Negative for slurred speech, Negative for facial droop. Negative for any focal neuro deficits. Gait: Walks with a steady gait.          ----Parts of this note were created using Dragon voice recognition software program. Significant efforts were made to correct any mistakes made by this voice recognition software program however some mistakes, errors, or omissions may remain in the note that were not caught when the note was originally created.         ED Course   Procedures  Labs Reviewed   PROTIME-INR - Abnormal; Notable for the  following:        Result Value    Prothrombin Time 23.1 (*)     INR 2.4 (*)     All other components within normal limits   APTT          Imaging Results    None          Medical Decision Making:   Initial Assessment:   Patient here is nontoxic and well-appearing.  Had a skin blister to his left cheek which was bleeding initially however resolved with firm pressure here in the ER.  However will place him hemostatic dressing and pressure dressing to the area to make sure does not rebleed.  I'll check his INR here to make sure his INR is within normal limits.  Patient here otherwise looks well is denying any other acute complaints and therefore no further extensive workup and evaluation will be done.  We'll monitor the patient closely here in the emergency room.  ED Management:  INR is in the range of where it needs to be.  Still not having any bleeding from a small blister on the left face.  Patient be discharged home instructed to follow closely the primary care physician.  Patient was instructed to return to the ER for any worsening condition or any other emergent concerns                      Clinical Impression:   The encounter diagnosis was Blister of cheek without infection, initial encounter.      Disposition:   Disposition: Discharged  Condition: Stable                        Ike Alatorre MD  06/22/18 0540

## 2018-06-25 DIAGNOSIS — M10.9 GOUT, UNSPECIFIED CAUSE, UNSPECIFIED CHRONICITY, UNSPECIFIED SITE: ICD-10-CM

## 2018-06-25 RX ORDER — ALLOPURINOL 100 MG/1
TABLET ORAL
Qty: 90 TABLET | Refills: 3 | Status: SHIPPED | OUTPATIENT
Start: 2018-06-25 | End: 2019-07-02 | Stop reason: SDUPTHER

## 2018-06-29 ENCOUNTER — OFFICE VISIT (OUTPATIENT)
Dept: NEPHROLOGY | Facility: CLINIC | Age: 78
End: 2018-06-29
Payer: MEDICARE

## 2018-06-29 ENCOUNTER — ANTI-COAG VISIT (OUTPATIENT)
Dept: CARDIOLOGY | Facility: CLINIC | Age: 78
End: 2018-06-29
Payer: MEDICARE

## 2018-06-29 VITALS
WEIGHT: 186.94 LBS | BODY MASS INDEX: 31.14 KG/M2 | HEIGHT: 65 IN | DIASTOLIC BLOOD PRESSURE: 52 MMHG | HEART RATE: 54 BPM | SYSTOLIC BLOOD PRESSURE: 150 MMHG | OXYGEN SATURATION: 97 %

## 2018-06-29 DIAGNOSIS — D63.1 ANEMIA OF CHRONIC RENAL FAILURE, STAGE 3 (MODERATE): ICD-10-CM

## 2018-06-29 DIAGNOSIS — N25.81 HYPERPARATHYROIDISM DUE TO RENAL INSUFFICIENCY: ICD-10-CM

## 2018-06-29 DIAGNOSIS — Z95.2 H/O MECHANICAL AORTIC VALVE REPLACEMENT: ICD-10-CM

## 2018-06-29 DIAGNOSIS — E11.22 CONTROLLED TYPE 2 DIABETES MELLITUS WITH STAGE 3 CHRONIC KIDNEY DISEASE, WITHOUT LONG-TERM CURRENT USE OF INSULIN: Primary | ICD-10-CM

## 2018-06-29 DIAGNOSIS — Z79.01 LONG TERM CURRENT USE OF ANTICOAGULANT THERAPY: Primary | ICD-10-CM

## 2018-06-29 DIAGNOSIS — N18.30 ANEMIA OF CHRONIC RENAL FAILURE, STAGE 3 (MODERATE): ICD-10-CM

## 2018-06-29 DIAGNOSIS — I12.9 HYPERTENSIVE KIDNEY DISEASE WITH CHRONIC KIDNEY DISEASE, STAGE 1-4 OR UNSPECIFIED CHRONIC KIDNEY DISEASE: ICD-10-CM

## 2018-06-29 DIAGNOSIS — N18.30 CONTROLLED TYPE 2 DIABETES MELLITUS WITH STAGE 3 CHRONIC KIDNEY DISEASE, WITHOUT LONG-TERM CURRENT USE OF INSULIN: Primary | ICD-10-CM

## 2018-06-29 DIAGNOSIS — E87.5 HYPERKALEMIA: ICD-10-CM

## 2018-06-29 LAB — INR PPP: 2.2 (ref 2–3)

## 2018-06-29 PROCEDURE — 3078F DIAST BP <80 MM HG: CPT | Mod: CPTII,S$GLB,, | Performed by: INTERNAL MEDICINE

## 2018-06-29 PROCEDURE — 99999 PR PBB SHADOW E&M-EST. PATIENT-LVL III: CPT | Mod: PBBFAC,,, | Performed by: INTERNAL MEDICINE

## 2018-06-29 PROCEDURE — 99211 OFF/OP EST MAY X REQ PHY/QHP: CPT | Mod: 25,S$GLB,,

## 2018-06-29 PROCEDURE — 99499 UNLISTED E&M SERVICE: CPT | Mod: S$GLB,,, | Performed by: INTERNAL MEDICINE

## 2018-06-29 PROCEDURE — 3077F SYST BP >= 140 MM HG: CPT | Mod: CPTII,S$GLB,, | Performed by: INTERNAL MEDICINE

## 2018-06-29 PROCEDURE — 85610 PROTHROMBIN TIME: CPT | Mod: QW,S$GLB,, | Performed by: INTERNAL MEDICINE

## 2018-06-29 PROCEDURE — 99213 OFFICE O/P EST LOW 20 MIN: CPT | Mod: S$GLB,,, | Performed by: INTERNAL MEDICINE

## 2018-06-29 NOTE — PROGRESS NOTES
Subjective:       Patient ID: Dariel Urbina is a 77 y.o. White male who presents for follow-up evaluation of Chronic Kidney Disease    HPI      Mr. Urbina is a 77 year old man with past medical history of hypertension presenting for follow up of chronic kidney disease.  Patient reports chronic shortness of breath with exertion, otherwise denies any symptoms, no fever, chest pain, abdominal pain, diarrhea, dysuria/hematuria. He reports blood pressure usually well-controlled, less than 130 systolic.  He reports adherence to low potassium diet most of the time.    Review of Systems   Constitutional: Negative for appetite change, fatigue and fever.   Respiratory: Negative for cough and shortness of breath.    Cardiovascular: Negative for chest pain and leg swelling.   Gastrointestinal: Negative for abdominal pain, constipation, diarrhea, nausea and vomiting.   Genitourinary: Negative for dysuria, flank pain, frequency, hematuria and urgency.   Musculoskeletal: Negative for arthralgias, back pain and joint swelling.   Skin: Negative for rash.   Neurological: Negative for dizziness and light-headedness.   All other systems reviewed and are negative.      Objective:      Physical Exam   Constitutional: He appears well-developed and well-nourished.   Cardiovascular: Normal rate and regular rhythm.  Exam reveals no gallop and no friction rub.    No murmur heard.  Pulmonary/Chest: Effort normal and breath sounds normal. No respiratory distress. He has no wheezes. He has no rales.   Musculoskeletal: He exhibits no edema.   Neurological: He is alert.   Skin: Skin is warm and dry. No rash noted.   Vitals reviewed.      Assessment:       1. Controlled type 2 diabetes mellitus with stage 3 chronic kidney disease, without long-term current use of insulin    2. Hyperkalemia    3. Hypertensive kidney disease with chronic kidney disease, stage 1-4 or unspecified chronic kidney disease    4. Anemia of chronic renal failure,  stage 3 (moderate)    5. Hyperparathyroidism due to renal insufficiency        Plan:      Mr. Urbina is a 77 year old man with past medical history of hypertension presenting for follow up of chronic kidney disease stage III.  Creatinine within baseline range, improved since last visit, will continue to trend for now. Stressed importance of blood pressure/sugar control to prevent any further progression of kidney disease, patient voiced understanding.  Encouraged weight loss and exercise as tolerated, along with fluid intake.   - Hypertension: blood pressure at goal, continue current meds   - Anemia: Hgb at goal, no indication for erythropoetin therapy  - Bone/mineral metabolism: patient with secondary hyperparathyroidism, PTH at goal for stage CKD  - Hyperkalemia: improved, again discussed low potassium diet, patient previously given handout.      Return to clinic 5-6 months with renal/heme panel, iron/TIBC/ferritin, urinalysis/culture, urine protein/creatinine ratio prior to next visit

## 2018-06-29 NOTE — PROGRESS NOTES
INR within therapeutic range today. Patient reports occasional bruising from use. He reports recent ER visit because of a blister that bled for longer than 5 minutes. He states that the bleeding stopped after applying pressure and a bandage.  He denies any other changes that may affect warfarin therapy. Will maintain current dose. Patient reminded to call coumadin clinic with any changes or concerns.

## 2018-07-25 ENCOUNTER — OFFICE VISIT (OUTPATIENT)
Dept: INTERNAL MEDICINE | Facility: CLINIC | Age: 78
End: 2018-07-25
Payer: MEDICARE

## 2018-07-25 ENCOUNTER — LAB VISIT (OUTPATIENT)
Dept: LAB | Facility: HOSPITAL | Age: 78
End: 2018-07-25
Attending: INTERNAL MEDICINE
Payer: MEDICARE

## 2018-07-25 ENCOUNTER — ANTI-COAG VISIT (OUTPATIENT)
Dept: CARDIOLOGY | Facility: CLINIC | Age: 78
End: 2018-07-25

## 2018-07-25 VITALS
HEART RATE: 80 BPM | SYSTOLIC BLOOD PRESSURE: 130 MMHG | DIASTOLIC BLOOD PRESSURE: 60 MMHG | OXYGEN SATURATION: 96 % | HEIGHT: 66 IN | WEIGHT: 189.13 LBS | BODY MASS INDEX: 30.4 KG/M2

## 2018-07-25 DIAGNOSIS — I71.9 AORTIC ANEURYSM WITHOUT RUPTURE, UNSPECIFIED PORTION OF AORTA: ICD-10-CM

## 2018-07-25 DIAGNOSIS — Z79.01 LONG TERM CURRENT USE OF ANTICOAGULANT THERAPY: ICD-10-CM

## 2018-07-25 DIAGNOSIS — Z90.49 HISTORY OF COLON RESECTION: ICD-10-CM

## 2018-07-25 DIAGNOSIS — I73.9 PVD (PERIPHERAL VASCULAR DISEASE): ICD-10-CM

## 2018-07-25 DIAGNOSIS — Z95.2 H/O MECHANICAL AORTIC VALVE REPLACEMENT: ICD-10-CM

## 2018-07-25 DIAGNOSIS — N18.30 ANEMIA OF CHRONIC RENAL FAILURE, STAGE 3 (MODERATE): Primary | ICD-10-CM

## 2018-07-25 DIAGNOSIS — D63.1 ANEMIA OF CHRONIC RENAL FAILURE, STAGE 3 (MODERATE): Primary | ICD-10-CM

## 2018-07-25 DIAGNOSIS — E11.51 TYPE 2 DIABETES MELLITUS WITH DIABETIC PERIPHERAL ANGIOPATHY WITHOUT GANGRENE, WITHOUT LONG-TERM CURRENT USE OF INSULIN: ICD-10-CM

## 2018-07-25 DIAGNOSIS — N18.30 CKD (CHRONIC KIDNEY DISEASE) STAGE 3, GFR 30-59 ML/MIN: ICD-10-CM

## 2018-07-25 DIAGNOSIS — E78.2 MIXED HYPERLIPIDEMIA: ICD-10-CM

## 2018-07-25 LAB
INR PPP: 2.7
PROTHROMBIN TIME: 27.2 SEC

## 2018-07-25 PROCEDURE — 3078F DIAST BP <80 MM HG: CPT | Mod: CPTII,S$GLB,, | Performed by: INTERNAL MEDICINE

## 2018-07-25 PROCEDURE — 99499 UNLISTED E&M SERVICE: CPT | Mod: HCNC,S$GLB,, | Performed by: INTERNAL MEDICINE

## 2018-07-25 PROCEDURE — 99214 OFFICE O/P EST MOD 30 MIN: CPT | Mod: S$GLB,,, | Performed by: INTERNAL MEDICINE

## 2018-07-25 PROCEDURE — 99999 PR PBB SHADOW E&M-EST. PATIENT-LVL IV: CPT | Mod: PBBFAC,,, | Performed by: INTERNAL MEDICINE

## 2018-07-25 PROCEDURE — 3075F SYST BP GE 130 - 139MM HG: CPT | Mod: CPTII,S$GLB,, | Performed by: INTERNAL MEDICINE

## 2018-07-25 PROCEDURE — 85610 PROTHROMBIN TIME: CPT

## 2018-07-25 PROCEDURE — 36415 COLL VENOUS BLD VENIPUNCTURE: CPT

## 2018-07-25 NOTE — PROGRESS NOTES
HPI    78 yo M here for follow up appointment for follow up of April hospital visit for GI bleed and MI. SInce May- HIs last visit he has been to the ed due to blister on check bleeding-- [facility] clinic just saw him- last IMR was 2.2      He has  diabetes, hypertension, CAD, HLD, gout and mechanical aortic valve who presented  with bloody stool.  Patient was recently admitted 4/2018) for diarrhea that was watery, and non-bloody. During his admission the patient developed an JIM, and with fluid replacement he developed hyperkalemia, which when being corrected the patient developed an NSTEMI. [facility] [place] determined no acute intervention was needed ( saw Dr Vences for follow up 4/26/18)  . Patient also had a nose bleed during admission and was found to have an elevated INR of 4.4, but that came down to 1.6 and the patient had ASA, Plavix during hospitalization and was discharged with Lovenox and coumadin. At the end of hospitalization, the patient had normal BM. . Pt denies abdo pain, chest pain, SOB, dizziness, lightheadedness, fatigue, leg swelling.  They want him to do C-scope but his wife wanted to wait .  He has a follow up with GI on Friday        Diabetes mellitus type 2. Last A1c was 5.4 -- . He was put on amaryl 1mg last visit.  .  . Weight today is 189 # lbs- heavly dressed. Weight stable sine last visit. . Patient denies polyuria, polydipsia, visual disturbances.         Hyperlipidemia. On lipitor 40 mg , lovaza. And Fenofibrate --Recent lipid panel showed CHol 143, Hdl 34, LDL 72.6, and .12/2017 He says he is taking all his cholesterol meds.         HTN. On monopril, lasix, imdur, lopressor. Pt does not measure BPs at home. BP today is 130/60. . HIs  ACE was stopped due to JIM and hyperkalemia. K=5.2.  He saw nephrology in Sept 2017.          CKD stage III. Pt saw Nephrology in 9/2017-- than note was reviewed. Cr back down to baseline of 1.3.  His potasium is   4.5. He has been instructed on a a low  potasium diet by nephrology, but is not following one.  .  Possible secondary hyperparathyroidism (- in March 2018) -- He saw Nephrology last month.         H/o partial colon resection due to diverticulosis. He has been having diarrhea since,- hospitalized in March for JIM and diarrhea-- but now diarrhea has stopped.   --   NO N/V.  No recent travel.  No sick contacts.  NO recent antibiotics recently.  HHis diarrhea has resolved.      .    H/o CAD s/p CABG 2002, multiple stents, aortic valve replacement on coumadin. He saws he been having  chest pain for about 3 years.   . He saw Dr. Vences In recently and that note was reviewed--  . He has LÓPEZ but this is chronic and some left sided claudication.   The chest pain he says in in his throat and her takes NTG and it does not help.  It is not with exertion but is with lying down.  He says the pain he is having is a burning in the throat. It has not changed in 3 years.        AAA- last us Showed 3.35 cm AAA- (2/2017 )largest supra renal area. since last visit - He had repeat us- and it was measured at 3.32 cm-- there was a comment about stenosis- but he is not having any claudication signs.             H/o gout. On allopurinol. Last flare was 18 mo ago in left foot. He remains on Allopurinol.      Valvular heart disease- with mechanical AV, some MVR and some TVR-- NO NO recent chest pains .  Reviewed recent PET stress test.   SOB with walking 50 feet- unchanged since last visit.       Review of Systems    Constitutional: Negative for fever, chills and unexpected weight change.    HENT: Negative for congestion, rhinorrhea and sinus pressure.    Eyes: Negative for visual disturbance.    Respiratory: He Has some SOB, but this is unchanged. He can mow the lawn with walk behind mower.    This has not changed since last visit.    Cardiovascular:as above.  .    Gastrointestinal: Negative for nausea, vomiting, abdominal pain, and constipation.  He does have some  "diarrhea-  - as above.    Genitourinary: Negative for dysuria, urgency and difficulty urinating.    Musculoskeletal: He reports his back- that was bothering him last vsiit- is not bothering him recently.    Skin: Negative.    Neurological: Negative for dizziness, syncope and headaches.    Hematological: Negative.    Psychiatric/Behavioral: Negative.    All other systems reviewed and are negative.    Objective:      /60   Pulse 80   Ht 5' 6" (1.676 m)   Wt 85.8 kg (189 lb 2.5 oz)   SpO2 96%   BMI 30.53 kg/m²           Constitutional: He is oriented to person, place, and time. He appears well-developed and well-nourished. No distress.    HENT:    Head: Normocephalic and atraumatic.    Mouth/Throat: Oropharynx is clear and moist. No oropharyngeal exudate.    Eyes: Conjunctivae and EOM are normal.    Neck: Normal range of motion. Neck supple. No JVD present. No tracheal deviation present.    Cardiovascular: Regular rhythm and normal heart sounds. Exam reveals no gallop and no friction rub.    Systolic murmur heard.  Pulse is 80  Pulmonary/Chest: Effort normal and breath sounds normal. No respiratory distress. He has no wheezes. He has no rales.    Abdominal: Soft. Bowel sounds are normal. He exhibits no distension. There is no tenderness. There is no rebound.   Musculoskeletal: Normal range of motion. He exhibits no edema and no tenderness.   Neurological: He is alert and oriented to person, place, and time.    Skin: Skin is warm and dry. No rash noted. He is not diaphoretic. No erythema.   Psychiatric: He has a normal mood and affect. His behavior is normal.       .ffot Decrease sensation to Monofilament test on both feet more distally.          Assessment:      1.   DM (diabetes mellitus)     2.   Hyperlipidemia     3.   Chronic kidney disease, stage III (moderate)     4.   History of colon resection     5.   Hypertension     6.   CAD (coronary atherosclerotic disease)     7.    8.  9.  10.  History of " aortic valve repair    AAA  [facility]- on coumadin   Colon polyps-- will refer back to GI.  Cards - ok'd getting c-scope      Plan:      1. GI bleed- resolved-- He has follow up  With GI   2. HLD. Lipid panel better since last visit. On lipitor, lovaza.  lipitor to 80 mg a day.   3. DM type 2 for stable-- Hgb A1c controled.  . - follow up in 6 months   4. CKD, stage III. Seeing nephrology. Counseled on importance of BP control. Cont BP meds. Discussed low potassium diet.    5. H/o partial colon resection. No complaints with N/V/D, constipation. c-scope due in 2020.    6. H/o CAD s/p CABG 2002, multiple stents, aortic valve replacement on coumadin. Cont coumadin and BP meds.  7. H/o gout.  Cont allopurinol.    8. hyperkalemia --this is better-- recheck in 4 months will need to follow  a low k diet  9.   HTN. Stable     Will set up for eye exam     WIll follow up in 6 months

## 2018-07-27 ENCOUNTER — OFFICE VISIT (OUTPATIENT)
Dept: GASTROENTEROLOGY | Facility: CLINIC | Age: 78
End: 2018-07-27
Payer: MEDICARE

## 2018-07-27 ENCOUNTER — TELEPHONE (OUTPATIENT)
Dept: ENDOSCOPY | Facility: HOSPITAL | Age: 78
End: 2018-07-27

## 2018-07-27 VITALS
SYSTOLIC BLOOD PRESSURE: 130 MMHG | HEIGHT: 66 IN | WEIGHT: 190.06 LBS | HEART RATE: 54 BPM | DIASTOLIC BLOOD PRESSURE: 61 MMHG | BODY MASS INDEX: 30.54 KG/M2

## 2018-07-27 DIAGNOSIS — Z79.01 ENCOUNTER FOR CURRENT LONG-TERM USE OF ANTICOAGULANTS: ICD-10-CM

## 2018-07-27 DIAGNOSIS — Z86.010 HX OF COLONIC POLYP: Primary | ICD-10-CM

## 2018-07-27 DIAGNOSIS — I25.810 ATHEROSCLEROSIS OF CORONARY ARTERY BYPASS GRAFT OF NATIVE HEART WITHOUT ANGINA PECTORIS: ICD-10-CM

## 2018-07-27 DIAGNOSIS — I15.0 RENOVASCULAR HYPERTENSION: ICD-10-CM

## 2018-07-27 DIAGNOSIS — I27.20 PULMONARY HTN: ICD-10-CM

## 2018-07-27 PROCEDURE — 99215 OFFICE O/P EST HI 40 MIN: CPT | Mod: S$GLB,,, | Performed by: PHYSICIAN ASSISTANT

## 2018-07-27 PROCEDURE — 99999 PR PBB SHADOW E&M-EST. PATIENT-LVL IV: CPT | Mod: PBBFAC,,, | Performed by: PHYSICIAN ASSISTANT

## 2018-07-27 PROCEDURE — 3078F DIAST BP <80 MM HG: CPT | Mod: CPTII,S$GLB,, | Performed by: PHYSICIAN ASSISTANT

## 2018-07-27 PROCEDURE — 3075F SYST BP GE 130 - 139MM HG: CPT | Mod: CPTII,S$GLB,, | Performed by: PHYSICIAN ASSISTANT

## 2018-07-27 NOTE — TELEPHONE ENCOUNTER
Patient has an order for a colonoscopy.  Is it ok to hold Coumadin 5 days prior to procedure?  Please advise.  Thank you.  Robyn

## 2018-07-27 NOTE — LETTER
July 28, 2018      Devon Langston Jr., MD  [address]  [place] [address]           [facility]  [address]  [place] [address]  Phone: [phone]  Fax: [phone]          Patient: Dariel Urbina   MR Number: [id number]   YOB: 1940   Date of Visit: 7/27/2018       Dear Dr. Devon Langston Jr.:    Thank you for referring Dariel Urbina to me for evaluation. Attached you will find relevant portions of my assessment and plan of care.    If you have questions, please do not hesitate to call me. I look forward to following Dariel Urbina along with you.    Sincerely,    Cristi Laws PA-C    Enclosure  CC:  No Recipients    If you would like to receive this communication electronically, please contact [email or web address] or [phone] to request more information on [facility] access.    For providers and/or their staff who would like to refer a patient to [facility], please contact us through our one-stop-shop provider referral line, [facility], at [phone].    If you feel you have received this communication in error or would no longer like to receive these types of communications, please e-mail [email or web address]

## 2018-07-28 PROBLEM — Z86.010 HX OF COLONIC POLYP: Status: ACTIVE | Noted: 2018-07-28

## 2018-07-28 PROBLEM — Z86.0100 HX OF COLONIC POLYP: Status: ACTIVE | Noted: 2018-07-28

## 2018-07-28 NOTE — PROGRESS NOTES
[facility] Consultation Note    Reason for Consult:  The primary encounter diagnosis was Hx of colonic polyp. Diagnoses of Encounter for current long-term use of anticoagulants, Renovascular hypertension, Atherosclerosis of coronary artery bypass graft of native heart without angina pectoris, and Pulmonary HTN were also pertinent to this visit.    PCP:   Devon Langston   [address] / [place] [address]    Referring MD:  Devon Langston Jr., Md  [address] [place] [address]    HPI:  This is a 77 y.o. male here for a hospital follow up    4/2/18 GI consult notes  This is a 76 y/o male with hx chronic diarrhea, diveritculitis s/p left sided colon resection?, DM, HTN, CAD, mechanical AV who presented with blood in stool.  Pt recently had admission for watery nonbloody diarrhea. Discharged last Wednesday. Also recently had NSTEMI, no intervention on [facility].  After discharge, patient had 2 days of constipation and this was followed by 3 -5 loose stools on Saturday.   That progressed to pinkish red color in the toilet bowl. His wife made him come in for evaluation.  He has been taking lovenox daily as outpatient as well as coumadin. He is not on asa or plavix.  Labs show Hgb stable and at baseline. Vital and hemodynamics stable.  Had colonoscopy 1 year ago - normal. Biopsies taken for micro colitis - negative.    4/3/18 colonoscopy    - Hemorrhoids found on perianal exam.                        - Blood in the rectum and in the sigmoid colon.                        - Patent end-to-end colo-colonic anastomosis,                         characterized by healthy appearing mucosa.                        - Diverticulosis in the sigmoid colon.                        - Diverticulosis in the sigmoid colon. There was                         active bleeding coming from the diverticular                         opening. Clip (MR conditional) was placed.                        - One 4 mm polyp  in the descending colon. Resection                         not attempted.                        - One 10 mm polyp at the splenic flexure. Resection                         not attempted. Tattooed.                        - The examination was otherwise normal.                        - The examined portion of the ileum was normal.                        - No specimens collected.  Will arrange outpatient colon in 3 months to remove polyps.    Interval Hx  He is here today to schedule his colonoscopy.   Today he has no complaints. He denies abdominal pain, nausea, vomiting, GERD, dysphagia, constipation, diarrhea, BRBPR, or melena.    ROS:  Constitutional: No fevers, chills, No weight loss  ENT: No allergies  CV: No chest pain  Pulm: No cough, No shortness of breath  Ophtho: No vision changes  GI: see HPI  Derm: No rash  Heme: No lymphadenopathy, No bruising  MSK: No arthritis  : No dysuria, No hematuria  Endo: No hot or cold intolerance  Neuro: No syncope, No seizure  Psych: No anxiety, No depression    Medical History:  has a past medical history of Anemia of chronic renal failure, stage 3 (moderate) (5/27/2015); Atherosclerosis of coronary artery bypass graft of native heart without angina pectoris (9/11/2012); Bilateral carotid artery disease (2/9/2017); Bleeding from the nose; Bleeding nose (3/21/2018); Cataract; CKD (chronic kidney disease) stage 3, GFR 30-59 ml/min (5/27/2015); Claudication of left lower extremity (9/17/2014); Colon polyp; Gastroesophageal reflux disease without esophagitis (3/19/2018); Gastrointestinal hemorrhage associated with intestinal diverticulosis (4/1/2018); H/O mechanical aortic valve replacement (9/17/2014); History of gout (9/26/2012); Hyperparathyroidism due to renal insufficiency (7/27/2015); Internal hemorrhoid (4/3/2018); Long term current use of anticoagulant therapy (9/26/2012); Mechanical heart valve present; Metabolic acidosis with normal anion gap and bicarbonate losses  (3/20/2018); Mixed hyperlipidemia (9/26/2012); NSTEMI (non-ST elevated myocardial infarction) (3/21/2018); Obesity, diabetes, and hypertension syndrome (2/23/2016); PVD (peripheral vascular disease) (9/11/2012); Renovascular hypertension (9/26/2012); Type 2 diabetes mellitus with diabetic peripheral angiopathy without gangrene (5/27/2015); and Type 2 diabetes mellitus with stage 3 chronic kidney disease, without long-term current use of insulin (10/2/2013).    Surgical History:  has a past surgical history that includes Cardiac valuve replacement; Cardiac catheterization; Coronary artery bypass graft; Coronary angioplasty; Cardiac valve replacement; Spine surgery; Vasectomy; Colon surgery; Colonoscopy (N/A, 3/31/2017); Hernia repair; and Colonoscopy (N/A, 4/3/2018).    Family History: family history includes Alcohol abuse in his father; Diabetes in his brother; Heart disease in his brother, father, mother, and sister; Heart failure in his brother, father, and mother; No Known Problems in his maternal aunt, maternal grandfather, maternal grandmother, maternal uncle, paternal aunt, paternal grandfather, paternal grandmother, paternal uncle, and sister..     Social History:  reports that he quit smoking about 37 years ago. He has a 20.00 pack-year smoking history. He has never used smokeless tobacco. He reports that he does not drink alcohol or use drugs.    Review of patient's allergies indicates:   Allergen Reactions    Fosinopril      Intolerance- elevates potassium level      Losartan      Intolerance- elevates potassium level       Current Outpatient Prescriptions on File Prior to Visit   Medication Sig Dispense Refill    allopurinol (ZYLOPRIM) 100 MG tablet TAKE 1 TABLET ONE TIME DAILY 90 tablet 3    aspirin (ECOTRIN) 81 MG EC tablet Take 1 tablet (81 mg total) by mouth once daily.  0    atorvastatin (LIPITOR) 80 MG tablet TAKE 1 TABLET ONE TIME DAILY 90 tablet 3    carvedilol (COREG) 12.5 MG tablet Take 1  "tablet (12.5 mg total) by mouth 2 (two) times daily. 180 tablet 4    clotrimazole-betamethasone 1-0.05% (LOTRISONE) cream Apply topically once daily. in between the 4th and 5th toes left foot. 45 g 1    fenofibrate micronized (LOFIBRA) 200 MG Cap Take 200 mg by mouth daily with breakfast.      ferrous sulfate 325 (65 FE) MG EC tablet Take 1 tablet (325 mg total) by mouth once daily. 90 tablet 4    furosemide (LASIX) 20 MG tablet TAKE 1 TABLET ONE TIME DAILY 90 tablet 4    glimepiride (AMARYL) 1 MG tablet Take 1 tablet (1 mg total) by mouth every morning. 90 tablet 2    isosorbide mononitrate (IMDUR) 120 MG 24 hr tablet TAKE 1 TABLET ONE TIME DAILY 90 tablet 4    NIFEdipine (PROCARDIA-XL) 60 MG (OSM) 24 hr tablet Take 1 tablet (60 mg total) by mouth once daily. 90 tablet 4    nitroGLYCERIN (NITROSTAT) 0.4 MG SL tablet Place 1 tablet (0.4 mg total) under the tongue every 5 (five) minutes as needed. As needed for chest pain 90 tablet 4    omega-3 acid ethyl esters (LOVAZA) 1 gram capsule Take 2 capsules (2 g total) by mouth 2 (two) times daily. 360 capsule 5    ranitidine (ZANTAC) 300 MG tablet       warfarin (COUMADIN) 5 MG tablet Take 1.5-2 tablets (7.5-10 mg total) by mouth Daily. As directed by coumadin clinic 150 tablet 3     No current facility-administered medications on file prior to visit.          Objective Findings:    Vital Signs:  /61   Pulse (!) 54   Ht 5' 6" (1.676 m)   Wt 86.2 kg (190 lb 0.6 oz)   BMI 30.67 kg/m²   Body mass index is 30.67 kg/m².    Physical Exam:  General Appearance: Well appearing in no acute distress  Head:   Normocephalic, without obvious abnormality  Eyes:    No scleral icterus  ENT: Neck supple, Lips, mucosa, and tongue normal  Lungs: CTA bilaterally in anterior and posterior fields, no wheezes, no crackles.  Heart:  Regular rate and rhythm, S1, S2 normal, + murmurs heard  Abdomen: Soft, non tender, non distended with positive bowel sounds in all four " quadrants.   Extremities: no edema  Skin: No rash  Neurologic: AAO x 3      Labs:  Lab Results   Component Value Date    WBC 5.86 05/09/2018    HGB 10.7 (L) 05/09/2018    HCT 32.8 (L) 05/09/2018     05/09/2018    CHOL 143 12/05/2017    TRIG 182 (H) 12/05/2017    HDL 34 (L) 12/05/2017    ALT 22 05/09/2018    AST 23 05/09/2018     05/09/2018    K 4.5 05/09/2018     (H) 05/09/2018    CREATININE 1.3 05/09/2018    BUN 41 (H) 05/09/2018    CO2 22 (L) 05/09/2018    TSH 3.029 02/02/2017    PSA 0.35 07/27/2012    INR 2.7 (H) 07/25/2018    HGBA1C 5.4 04/01/2018       Imaging:    Endoscopy:      Assessment:  1. Hx of colonic polyp    2. Encounter for current long-term use of anticoagulants    3. Renovascular hypertension    4. Atherosclerosis of coronary artery bypass graft of native heart without angina pectoris    5. Pulmonary HTN       78yo F seen in the hospital for a diverticular bleed. Colonoscopy revealed polyps which were tattooed but not removed due to the blood in the colon    Recommendations:  1. Schedule colonoscopy for polyp removal. He is high risk for endoscopy with pulm htn  PA pressure 49, CAD. He will require 2nd floor endoscopy    No Follow-up on file.      Order summary:  Orders Placed This Encounter    Case request GI: COLONOSCOPY         Thank you so much for allowing me to participate in the care of Dariel Devon Laws PA-C

## 2018-07-30 NOTE — PROGRESS NOTES
Received request for clearance to have cscope. Patient can hold 5 days but will need lovenox bridge due to h/o of mechanical valve and cardiac risks. Will ask them to notify us of date for further planning.     Cscope scheduled 8/13. Booked 30 min visit on 8/7 to give instructions. Wife advised. Lovenox dose 120mg q24h as he did for his last bridge (1.5mg/kg q24 dose). CrCl>30ml/min. Weight 86.2 kg. Rx sent to [facility] pharmacy in [place].

## 2018-07-31 ENCOUNTER — TELEPHONE (OUTPATIENT)
Dept: ENDOSCOPY | Facility: HOSPITAL | Age: 78
End: 2018-07-31

## 2018-07-31 DIAGNOSIS — Z12.11 SPECIAL SCREENING FOR MALIGNANT NEOPLASMS, COLON: Primary | ICD-10-CM

## 2018-07-31 RX ORDER — ENOXAPARIN SODIUM 150 MG/ML
120 INJECTION SUBCUTANEOUS
Qty: 10 SYRINGE | Refills: 0 | Status: SHIPPED | OUTPATIENT
Start: 2018-07-31 | End: 2018-08-17 | Stop reason: SDUPTHER

## 2018-07-31 RX ORDER — POLYETHYLENE GLYCOL 3350, SODIUM SULFATE ANHYDROUS, SODIUM BICARBONATE, SODIUM CHLORIDE, POTASSIUM CHLORIDE 236; 22.74; 6.74; 5.86; 2.97 G/4L; G/4L; G/4L; G/4L; G/4L
4 POWDER, FOR SOLUTION ORAL ONCE
Qty: 4000 ML | Refills: 0 | Status: SHIPPED | OUTPATIENT
Start: 2018-07-31 | End: 2018-08-08

## 2018-07-31 NOTE — TELEPHONE ENCOUNTER
July 31, 2018              11:45 AM   You routed this conversation to [facility] Coumad Provider    Me          11:44 AM   Note      Patient scheduled for colonoscopy on 8/13/18 at 9:30 am with Dr Barba on the 2nd floor.         July 30, 2018              12:17 PM   Domonique Rosales PharmD routed this conversation to Me    Domonique Rosales PharmD          12:16 PM   Note      Patient can hold 5 days with lovenox bridge. Please let us know once scheduled. Thanks      July 27, 2018              11:08 AM   Domonique Rosales PharmD routed this conversation to Patrick MortensenD              9:03 AM   Kareen Meneses PharmD routed this conversation to Patrick MortensenD               8:51 AM   You routed this conversation to [facility] Coumad Provider    Me          8:49 AM   Note      Patient has an order for a colonoscopy.  Is it ok to hold Coumadin 5 days prior to procedure?  Please advise.  Thank you.  Robyn

## 2018-08-07 ENCOUNTER — ANTI-COAG VISIT (OUTPATIENT)
Dept: CARDIOLOGY | Facility: CLINIC | Age: 78
End: 2018-08-07
Payer: MEDICARE

## 2018-08-07 DIAGNOSIS — Z79.01 LONG TERM CURRENT USE OF ANTICOAGULANT THERAPY: Primary | ICD-10-CM

## 2018-08-07 DIAGNOSIS — Z95.2 H/O MECHANICAL AORTIC VALVE REPLACEMENT: ICD-10-CM

## 2018-08-07 LAB — INR PPP: 3.9 (ref 2–3)

## 2018-08-07 PROCEDURE — 85610 PROTHROMBIN TIME: CPT | Mod: QW,S$GLB,, | Performed by: INTERNAL MEDICINE

## 2018-08-07 NOTE — PROGRESS NOTES
INR elevated today for no apparent reason.  Patient scheduled for colonoscopy on 8/13 with Lovenox 120 mg every 24 hours (pm dose) per Lovenox bridge 8/8 - 8/11 then 8/14 - /16.  Will hold Coumadin 8/7 - 8/12 then plan to resume on 8/13 post colonoscopy see calendar.  Will follow up 8/17.  I instructed the patient and his wife on plan of care with Lovenox bridge and they verbalized understanding.  They are both experienced with the Lovenox injections from prior use.  Bruising from use but denies bleeding or other issues.  MICHAEL Rosales Pharm D assisted with dosing

## 2018-08-13 ENCOUNTER — ANESTHESIA (OUTPATIENT)
Dept: ENDOSCOPY | Facility: HOSPITAL | Age: 78
End: 2018-08-13
Payer: MEDICARE

## 2018-08-13 ENCOUNTER — HOSPITAL ENCOUNTER (OUTPATIENT)
Facility: HOSPITAL | Age: 78
Discharge: HOME | End: 2018-08-13
Attending: INTERNAL MEDICINE | Admitting: INTERNAL MEDICINE
Payer: MEDICARE

## 2018-08-13 ENCOUNTER — ANESTHESIA EVENT (OUTPATIENT)
Dept: ENDOSCOPY | Facility: HOSPITAL | Age: 78
End: 2018-08-13
Payer: MEDICARE

## 2018-08-13 VITALS
RESPIRATION RATE: 16 BRPM | OXYGEN SATURATION: 96 % | BODY MASS INDEX: 31.82 KG/M2 | HEIGHT: 65 IN | SYSTOLIC BLOOD PRESSURE: 145 MMHG | TEMPERATURE: 97 F | WEIGHT: 191 LBS | HEART RATE: 56 BPM | DIASTOLIC BLOOD PRESSURE: 51 MMHG

## 2018-08-13 DIAGNOSIS — Z90.49 HISTORY OF COLON RESECTION: Primary | ICD-10-CM

## 2018-08-13 DIAGNOSIS — K92.1 HEMATOCHEZIA: ICD-10-CM

## 2018-08-13 LAB
POCT GLUCOSE: 103 MG/DL (ref 70–110)
POCT GLUCOSE: 92 MG/DL (ref 70–110)
POCT GLUCOSE: <20 MG/DL (ref 70–110)

## 2018-08-13 PROCEDURE — D9220A PRA ANESTHESIA: Mod: ANES,,, | Performed by: ANESTHESIOLOGY

## 2018-08-13 PROCEDURE — 82962 GLUCOSE BLOOD TEST: CPT | Performed by: INTERNAL MEDICINE

## 2018-08-13 PROCEDURE — 37000009 HC ANESTHESIA EA ADD 15 MINS: Performed by: INTERNAL MEDICINE

## 2018-08-13 PROCEDURE — 25000003 PHARM REV CODE 250: Performed by: INTERNAL MEDICINE

## 2018-08-13 PROCEDURE — 27201089 HC SNARE, DISP (ANY): Performed by: INTERNAL MEDICINE

## 2018-08-13 PROCEDURE — 88305 TISSUE EXAM BY PATHOLOGIST: CPT | Performed by: PATHOLOGY

## 2018-08-13 PROCEDURE — 37000008 HC ANESTHESIA 1ST 15 MINUTES: Performed by: INTERNAL MEDICINE

## 2018-08-13 PROCEDURE — 45385 COLONOSCOPY W/LESION REMOVAL: CPT | Mod: ,,, | Performed by: INTERNAL MEDICINE

## 2018-08-13 PROCEDURE — 45385 COLONOSCOPY W/LESION REMOVAL: CPT | Performed by: INTERNAL MEDICINE

## 2018-08-13 PROCEDURE — 63600175 PHARM REV CODE 636 W HCPCS: Performed by: NURSE ANESTHETIST, CERTIFIED REGISTERED

## 2018-08-13 PROCEDURE — D9220A PRA ANESTHESIA: Mod: CRNA,,, | Performed by: NURSE ANESTHETIST, CERTIFIED REGISTERED

## 2018-08-13 RX ORDER — SODIUM CHLORIDE 0.9 % (FLUSH) 0.9 %
3 SYRINGE (ML) INJECTION
Status: DISCONTINUED | OUTPATIENT
Start: 2018-08-13 | End: 2018-08-13 | Stop reason: HOSPADM

## 2018-08-13 RX ORDER — SODIUM CHLORIDE 9 MG/ML
INJECTION, SOLUTION INTRAVENOUS CONTINUOUS
Status: DISCONTINUED | OUTPATIENT
Start: 2018-08-13 | End: 2018-08-13 | Stop reason: HOSPADM

## 2018-08-13 RX ORDER — PROPOFOL 10 MG/ML
VIAL (ML) INTRAVENOUS
Status: DISCONTINUED | OUTPATIENT
Start: 2018-08-13 | End: 2018-08-13

## 2018-08-13 RX ORDER — LIDOCAINE HCL/PF 100 MG/5ML
SYRINGE (ML) INTRAVENOUS
Status: DISCONTINUED | OUTPATIENT
Start: 2018-08-13 | End: 2018-08-13

## 2018-08-13 RX ORDER — PROPOFOL 10 MG/ML
VIAL (ML) INTRAVENOUS CONTINUOUS PRN
Status: DISCONTINUED | OUTPATIENT
Start: 2018-08-13 | End: 2018-08-13

## 2018-08-13 RX ADMIN — PROPOFOL 50 MG: 10 INJECTION, EMULSION INTRAVENOUS at 09:08

## 2018-08-13 RX ADMIN — LIDOCAINE HYDROCHLORIDE 50 MG: 20 INJECTION, SOLUTION INTRAVENOUS at 09:08

## 2018-08-13 RX ADMIN — SODIUM CHLORIDE: 0.9 INJECTION, SOLUTION INTRAVENOUS at 09:08

## 2018-08-13 RX ADMIN — PROPOFOL 150 MCG/KG/MIN: 10 INJECTION, EMULSION INTRAVENOUS at 09:08

## 2018-08-13 NOTE — H&P
Short Stay Endoscopy History and Physical    PCP - Devon Langston MD  Referring Physician - Cristi Laws PA-C  [address] [place] [address]    Procedure - colonoscopy  ASA - per anesthesia  Mallampati - per anesthesia  History of Anesthesia problems - no  Family history Anesthesia problems -  no   Plan of anesthesia - General    HPI:  This is a 78 y.o. male here for evaluation of: polyps removal    Reflux - no  Dysphagia - no  Abdominal pain - no  Diarrhea - no    ROS:  Constitutional: No fevers, chills, No weight loss  CV: No chest pain  Pulm: No cough, No shortness of breath  Ophtho: No vision changes  GI: see HPI  Derm: No rash    Medical History:  has a past medical history of Anemia of chronic renal failure, stage 3 (moderate) (5/27/2015), Atherosclerosis of coronary artery bypass graft of native heart without angina pectoris (9/11/2012), Bilateral carotid artery disease (2/9/2017), Bleeding from the nose, Bleeding nose (3/21/2018), Cataract, CKD (chronic kidney disease) stage 3, GFR 30-59 ml/min (5/27/2015), Claudication of left lower extremity (9/17/2014), Colon polyp, Gastroesophageal reflux disease without esophagitis (3/19/2018), Gastrointestinal hemorrhage associated with intestinal diverticulosis (4/1/2018), H/O mechanical aortic valve replacement (9/17/2014), History of gout (9/26/2012), Hyperparathyroidism due to renal insufficiency (7/27/2015), Internal hemorrhoid (4/3/2018), Long term current use of anticoagulant therapy (9/26/2012), Mechanical heart valve present, Metabolic acidosis with normal anion gap and bicarbonate losses (3/20/2018), Mixed hyperlipidemia (9/26/2012), NSTEMI (non-ST elevated myocardial infarction) (3/21/2018), Obesity, diabetes, and hypertension syndrome (2/23/2016), PVD (peripheral vascular disease) (9/11/2012), Renovascular hypertension (9/26/2012), Type 2 diabetes mellitus with diabetic peripheral angiopathy without gangrene (5/27/2015), and Type 2  diabetes mellitus with stage 3 chronic kidney disease, without long-term current use of insulin (10/2/2013).    Surgical History:  has a past surgical history that includes Cardiac valuve replacement; Cardiac catheterization; Coronary artery bypass graft; Coronary angioplasty; Cardiac valve replacement; Spine surgery; Vasectomy; Colon surgery; Hernia repair; COLONOSCOPY (N/A, 4/3/2018); Coronary angiography (N/A, 3/27/2018); and COLONOSCOPY (N/A, 3/31/2017).    Family History: family history includes Alcohol abuse in his father; Diabetes in his brother; Heart disease in his brother, father, mother, and sister; Heart failure in his brother, father, and mother; No Known Problems in his maternal aunt, maternal grandfather, maternal grandmother, maternal uncle, paternal aunt, paternal grandfather, paternal grandmother, paternal uncle, and sister..    Social History:  reports that he quit smoking about 37 years ago. He has a 20.00 pack-year smoking history. he has never used smokeless tobacco. He reports that he does not drink alcohol or use drugs.    Review of patient's allergies indicates:   Allergen Reactions    Fosinopril      Intolerance- elevates potassium level      Losartan      Intolerance- elevates potassium level       Medications:   Medications Prior to Admission   Medication Sig Dispense Refill Last Dose    allopurinol (ZYLOPRIM) 100 MG tablet TAKE 1 TABLET ONE TIME DAILY 90 tablet 3 Past Week at Unknown time    aspirin (ECOTRIN) 81 MG EC tablet Take 1 tablet (81 mg total) by mouth once daily.  0 Past Week at Unknown time    atorvastatin (LIPITOR) 80 MG tablet TAKE 1 TABLET ONE TIME DAILY 90 tablet 3 8/13/2018 at Unknown time    carvedilol (COREG) 12.5 MG tablet Take 1 tablet (12.5 mg total) by mouth 2 (two) times daily. 180 tablet 4 8/13/2018 at Unknown time    clotrimazole-betamethasone 1-0.05% (LOTRISONE) cream Apply topically once daily. in between the 4th and 5th toes left foot. 45 g 1 Past Week  at Unknown time    enoxaparin (LOVENOX) 120 mg/0.8 mL Syrg Inject 0.8 mLs (120 mg total) into the skin every 24 hours as needed (as directed by coumadin clinic). 10 Syringe 0 Past Week at Unknown time    fenofibrate micronized (LOFIBRA) 200 MG Cap Take 200 mg by mouth daily with breakfast.   Past Week at Unknown time    glimepiride (AMARYL) 1 MG tablet Take 1 tablet (1 mg total) by mouth every morning. 90 tablet 2 8/12/2018 at Unknown time    NIFEdipine (PROCARDIA-XL) 60 MG (OSM) 24 hr tablet Take 1 tablet (60 mg total) by mouth once daily. 90 tablet 4 8/13/2018 at Unknown time    warfarin (COUMADIN) 5 MG tablet Take 1.5-2 tablets (7.5-10 mg total) by mouth Daily. As directed by coumadin clinic 150 tablet 3 Past Week at Unknown time    ferrous sulfate 325 (65 FE) MG EC tablet Take 1 tablet (325 mg total) by mouth once daily. 90 tablet 4 Unknown at Unknown time    furosemide (LASIX) 20 MG tablet TAKE 1 TABLET ONE TIME DAILY 90 tablet 4 Unknown at Unknown time    isosorbide mononitrate (IMDUR) 120 MG 24 hr tablet TAKE 1 TABLET ONE TIME DAILY 90 tablet 4 Unknown at Unknown time    nitroGLYCERIN (NITROSTAT) 0.4 MG SL tablet Place 1 tablet (0.4 mg total) under the tongue every 5 (five) minutes as needed. As needed for chest pain 90 tablet 4 Unknown at Unknown time    omega-3 acid ethyl esters (LOVAZA) 1 gram capsule Take 2 capsules (2 g total) by mouth 2 (two) times daily. 360 capsule 5 Unknown at Unknown time    ranitidine (ZANTAC) 300 MG tablet    Unknown at Unknown time       Physical Exam:    Vital Signs:   Vitals:    08/13/18 0834   BP: (!) 159/85   Pulse: (!) 59   Resp: 17   Temp: 97.7 °F (36.5 °C)       General Appearance: Well appearing in no acute distress  Eyes:    No scleral icterus  ENT: Neck supple  Lungs: CTA anteriorly  Heart:  Regular rate  Abdomen: Soft, non tender, non distended with normal bowel sounds.  Extremities: No edema  Skin: No rash    Labs:  Lab Results   Component Value Date    WBC  5.86 05/09/2018    HGB 10.7 (L) 05/09/2018    HCT 32.8 (L) 05/09/2018     05/09/2018    CHOL 143 12/05/2017    TRIG 182 (H) 12/05/2017    HDL 34 (L) 12/05/2017    ALT 22 05/09/2018    AST 23 05/09/2018     05/09/2018    K 4.5 05/09/2018     (H) 05/09/2018    CREATININE 1.3 05/09/2018    BUN 41 (H) 05/09/2018    CO2 22 (L) 05/09/2018    TSH 3.029 02/02/2017    PSA 0.35 07/27/2012    INR 3.9 08/07/2018    HGBA1C 5.4 04/01/2018       I have explained the risks and benefits of this endoscopic procedure to the patient including but not limited to bleeding, inflammation, infection, perforation, and death.      Kam Barba MD

## 2018-08-13 NOTE — PROGRESS NOTES
Wife/ called today 08/13/18 to inform us that the Dr requested for pt to start coumadin not today 08/13 but on 08/14/18. Please advise.

## 2018-08-13 NOTE — DISCHARGE INSTRUCTIONS
Colonoscopy     A camera attached to a flexible tube with a viewing lens is used to take video pictures.     Colonoscopy is a test to view the inside of your lower digestive tract (colon and rectum). Sometimes it can show the last part of the small intestine (ileum). During the test, small pieces of tissue may be removed for testing. This is called a biopsy. Small growths, such as polyps, may also be removed.     Why is colonoscopy done?  The test is done to help look for colon cancer. And it can help find the source of abdominal pain, bleeding, and changes in bowel habits. It may be needed once a year, depending on factors such as your:  · Age  · Health history  · Family health history  · Symptoms  · Results from any prior colonoscopy    Risks and possible complications  These include:  · Bleeding               · A puncture or tear in the colon   · Risks of anesthesia  · A cancer lesion not being seen    Getting ready   To prepare for the test:  · Talk with your healthcare provider about the risks of the test (see below). Also ask your healthcare provider about alternatives to the test.  · Tell your healthcare provider about any medicines you take. Also tell him or her about any health conditions you may have.  · Make sure your rectum and colon are empty for the test. Follow the diet and bowel prep instructions exactly. If you dont, the test may need to be rescheduled.  · Plan for a friend or family member to drive you home after the test.     Colonoscopy provides an inside view of the entire colon.     You may discuss the results with your doctor right away or at a future visit.    During the test   The test is usually done in the hospital on an outpatient basis. This means you go home the same day. The procedure takes about 30 minutes. During that time:  · You are given relaxing (sedating) medicine through an IV line. You may be drowsy, or fully asleep.  · The healthcare provider will first give you a physical  exam to check for anal and rectal problems.  · Then the anus is lubricated and the scope inserted.  · If you are awake, you may have a feeling similar to needing to have a bowel movement. You may also feel pressure as air is pumped into the colon. Its OK to pass gas during the procedure.  · Biopsy, polyp removal, or other treatments may be done during the test.  After the test   You may have gas right after the test. It can help to try to pass it to help prevent later bloating. Your healthcare provider may discuss the results with you right away. Or you may need to schedule a follow-up visit to talk about the results. After the test, you can go back to your normal eating and other activities. You may be tired from the sedation and need to rest for a few hours.      Recovery After Procedural Sedation (Adult)    You have been given medicine by vein to make you sleep during your surgery. This may have included both a pain medicine and sleeping medicine. Most of the effects have worn off. But you may still have some drowsiness for the next 6 to 8 hours.    Home care    Follow these guidelines when you get home:  · For the next 8 hours, you should be watched by a responsible adult. This person should make sure your condition is not getting worse.  · Don't drink any alcohol for the next 24 hours.  · Don't drive, operate dangerous machinery, or make important business or personal decisions during the next 24 hours.  Note: Your healthcare provider may tell you not to take any medicine by mouth for pain or sleep in the next 4 hours. These medicines may react with the medicines you were given in the hospital. This could cause a much stronger response than usual.    Follow-up care    Follow up with your healthcare provider if you are not alert and back to your usual level of activity within 12 hours.    When to seek medical advice    Call your healthcare provider right away if any of these occur:  · Drowsiness gets  worse  · Weakness or dizziness gets worse  · Repeated vomiting  · You can't be awakened

## 2018-08-13 NOTE — ANESTHESIA PREPROCEDURE EVALUATION
08/13/2018  Pre-operative evaluation for Procedure(s) (LRB):  COLONOSCOPY (N/A)    Dariel Urbina is a 78 y.o. male with PMH has PMH for HTN, CKD III, HLD, CAD, DMII, s/p mechanical aortic valve placement, diastolic heart failure, diverticulosis (s/p left-sided colectomy), chronic diarrhea presenting with a 1 day history of BRBPR. GI was consulted to work-up a possible GIB.    Labs show Hgb stable and at baseline. Vital and hemodynamics stable.    LDA: L AC 18g    Prev airway: None on file    Drips: Heparin    Patient Active Problem List   Diagnosis    PVD (peripheral vascular disease)    Atherosclerosis of coronary artery bypass graft of native heart without angina pectoris    Long term current use of anticoagulant therapy    Mixed hyperlipidemia    History of colon resection    Renovascular hypertension    History of gout    Type 2 diabetes mellitus with stage 3 chronic kidney disease, without long-term current use of insulin    H/O mechanical aortic valve replacement    CKD (chronic kidney disease) stage 3, GFR 30-59 ml/min    Anemia of chronic renal failure, stage 3 (moderate)    Aneurysm artery, iliac    Aneurysm of aorta    Type 2 diabetes mellitus with diabetic peripheral angiopathy without gangrene    Hyperparathyroidism due to renal insufficiency    Obesity, diabetes, and hypertension syndrome    Bilateral carotid artery disease    Pulmonary heart disease    Metabolic acidosis with normal anion gap and bicarbonate losses    Gastrointestinal hemorrhage associated with intestinal diverticulosis    Hx of colonic polyp       Review of patient's allergies indicates:   Allergen Reactions    Fosinopril      Intolerance- elevates potassium level      Losartan      Intolerance- elevates potassium level        Current Outpatient Medications on File Prior to Visit   Medication Sig  Dispense Refill    allopurinol (ZYLOPRIM) 100 MG tablet TAKE 1 TABLET ONE TIME DAILY 90 tablet 3    aspirin (ECOTRIN) 81 MG EC tablet Take 1 tablet (81 mg total) by mouth once daily.  0    atorvastatin (LIPITOR) 80 MG tablet TAKE 1 TABLET ONE TIME DAILY 90 tablet 3    carvedilol (COREG) 12.5 MG tablet Take 1 tablet (12.5 mg total) by mouth 2 (two) times daily. 180 tablet 4    clotrimazole-betamethasone 1-0.05% (LOTRISONE) cream Apply topically once daily. in between the 4th and 5th toes left foot. 45 g 1    enoxaparin (LOVENOX) 120 mg/0.8 mL Syrg Inject 0.8 mLs (120 mg total) into the skin every 24 hours as needed (as directed by coumadin clinic). 10 Syringe 0    fenofibrate micronized (LOFIBRA) 200 MG Cap Take 200 mg by mouth daily with breakfast.      ferrous sulfate 325 (65 FE) MG EC tablet Take 1 tablet (325 mg total) by mouth once daily. 90 tablet 4    furosemide (LASIX) 20 MG tablet TAKE 1 TABLET ONE TIME DAILY 90 tablet 4    glimepiride (AMARYL) 1 MG tablet Take 1 tablet (1 mg total) by mouth every morning. 90 tablet 2    isosorbide mononitrate (IMDUR) 120 MG 24 hr tablet TAKE 1 TABLET ONE TIME DAILY 90 tablet 4    NIFEdipine (PROCARDIA-XL) 60 MG (OSM) 24 hr tablet Take 1 tablet (60 mg total) by mouth once daily. 90 tablet 4    nitroGLYCERIN (NITROSTAT) 0.4 MG SL tablet Place 1 tablet (0.4 mg total) under the tongue every 5 (five) minutes as needed. As needed for chest pain 90 tablet 4    omega-3 acid ethyl esters (LOVAZA) 1 gram capsule Take 2 capsules (2 g total) by mouth 2 (two) times daily. 360 capsule 5    ranitidine (ZANTAC) 300 MG tablet       warfarin (COUMADIN) 5 MG tablet Take 1.5-2 tablets (7.5-10 mg total) by mouth Daily. As directed by coumadin clinic 150 tablet 3     No current facility-administered medications on file prior to visit.        Past Surgical History:   Procedure Laterality Date    CARDIAC CATHETERIZATION      CARDIAC VALVE REPLACEMENT      CARDIAC VALVE SURGERY       COLON SURGERY      CORONARY ANGIOPLASTY      CORONARY ARTERY BYPASS GRAFT      HERNIA REPAIR      SPINE SURGERY      VASECTOMY         Social History     Socioeconomic History    Marital status:      Spouse name: Ameena    Number of children: 3    Years of education: Not on file    Highest education level: Not on file   Social Needs    Financial resource strain: Not on file    Food insecurity - worry: Not on file    Food insecurity - inability: Not on file    Transportation needs - medical: Not on file    Transportation needs - non-medical: Not on file   Occupational History    Occupation: retired   Tobacco Use    Smoking status: Former Smoker     Packs/day: 1.00     Years: 20.00     Pack years: 20.00     Last attempt to quit: 1980     Years since quittin.9    Smokeless tobacco: Never Used   Substance and Sexual Activity    Alcohol use: No    Drug use: No    Sexual activity: No   Other Topics Concern    Not on file   Social History Narrative    Not on file         Vital Signs Range (Last 24H):  BP: ()/()   Arterial Line BP: ()/()       CBC:   No results for input(s): WBC, RBC, HGB, HCT, PLT, MCV, MCH, MCHC in the last 72 hours.    CMP:   No results for input(s): NA, K, CL, CO2, BUN, CREATININE, GLU, MG, PHOS, CALCIUM, ALBUMIN, PROT, ALKPHOS, ALT, AST, BILITOT in the last 72 hours.    INR  No results for input(s): PT, INR, PROTIME, APTT in the last 72 hours.    Diagnostic Studies:      EKG:  Sinus bradycardia with 1st degree A-V block with occasional Premature  ventricular complexes  Left atrial abnormality/enlargement  Left bundle branch block  Abnormal ECG  When compared with ECG of 28-MAR-2018 07:59,  T wave inversion more evident in Inferior leads  Confirmed by MARK CONLEY MD ([id number]) on 2018 11:19:52 AM    2D Echo:  CONCLUSIONS: ABNORMAL MYOCARDIAL PERFUSION PET STRESS TEST  1. There is a moderate sized mild to moderate ischemia in the base to distal inferolateral wall.  This defect comprises 15 % of the left ventricular myocardium.   2. Resting wall motion is physiologic. Stress wall motion is physiologic.   3. LV function is normal at rest and stress.  (normal is >= 51%)  4. The ventricular volumes are normal at rest and stress.   5. The extracardiac distribution of radioactivity is normal.   6. When compared to the previous study from 12/8/16, there is no signficant change.    Pre-op Assessment    I have reviewed the Patient Summary Reports.     I have reviewed the Nursing Notes.   I have reviewed the Medications.     Review of Systems  Anesthesia Hx:  No problems with previous Anesthesia  History of prior surgery of interest to airway management or planning: Denies Family Hx of Anesthesia complications.    Social:  Former Smoker, No Alcohol Use    Cardiovascular:   Exercise tolerance: poor Hypertension CAD  CABG/stent  Angina, with exertion LÓPEZ S/p AVR   Pulmonary:   Denies Asthma.  Denies Sleep Apnea.    Renal/:   Chronic Renal Disease, CRI    Hepatic/GI:   Denies GERD.    Endocrine:   Diabetes        Physical Exam  General:  Well nourished    Airway/Jaw/Neck:  Airway Findings: Mouth Opening: Normal Tongue: Normal  General Airway Assessment: Adult  Mallampati: II  TM Distance: Normal, at least 6 cm  Jaw/Neck Findings:  Neck ROM: Normal ROM      Dental:  Dental Findings: In tact, Periodontal disease, Mild    Chest/Lungs:  Chest/Lungs Findings: Clear to auscultation, Normal Respiratory Rate     Heart/Vascular:  Heart Findings: Rate: Normal  Rhythm: Regular Rhythm        Mental Status:  Mental Status Findings:  Cooperative, Alert and Oriented         Anesthesia Plan  Type of Anesthesia, risks & benefits discussed:  Anesthesia Type:  MAC, general  Patient's Preference:   Intra-op Monitoring Plan: standard ASA monitors  Intra-op Monitoring Plan Comments:   Post Op Pain Control Plan: per primary service following discharge from PACU and IV/PO Opioids PRN  Post Op Pain Control Plan  Comments:   Induction:   IV  Beta Blocker:  Patient is on a Beta-Blocker and has received one dose within the past 24 hours (No further documentation required).       Informed Consent: Patient understands risks and agrees with Anesthesia plan.  Questions answered. Anesthesia consent signed with patient.  ASA Score: 3     Day of Surgery Review of History & Physical:    H&P update referred to the provider.         Ready For Surgery From Anesthesia Perspective.

## 2018-08-13 NOTE — [employer] PATIENT INSTRUCTIONS
Discharge Summary/Instructions after an Endoscopic Procedure  Patient Name: Dariel Urbina  Patient MRN: [id number]  Patient YOB: 1940 Monday, August 13, 2018  Kam Barba MD  RESTRICTIONS:  During your procedure today, you received medications for sedation.  These   medications may affect your judgment, balance and coordination.  Therefore,   for 24 hours, you have the following restrictions:   - DO NOT drive a car, operate machinery, make legal/financial decisions,   sign important papers or drink alcohol.    ACTIVITY:  Today: no heavy lifting, straining or running due to procedural   sedation/anesthesia.  The following day: return to full activity including work.  DIET:  Eat and drink normally unless instructed otherwise.     TREATMENT FOR COMMON SIDE EFFECTS:  - Mild abdominal pain, nausea, belching, bloating or excessive gas:  rest,   eat lightly and use a heating pad.  - Sore Throat: treat with throat lozenges and/or gargle with warm salt   water.  - Because air was used during the procedure, expelling large amounts of air   from your rectum or belching is normal.  - If a bowel prep was taken, you may not have a bowel movement for 1-3 days.    This is normal.  SYMPTOMS TO WATCH FOR AND REPORT TO YOUR PHYSICIAN:  1. Abdominal pain or bloating, other than gas cramps.  2. Chest pain.  3. Back pain.  4. Signs of infection such as: chills or fever occurring within 24 hours   after the procedure.  5. Rectal bleeding, which would show as bright red, maroon, or black stools.   (A tablespoon of blood from the rectum is not serious, especially if   hemorrhoids are present.)  6. Vomiting.  7. Weakness or dizziness.  GO DIRECTLY TO THE NEAREST EMERGENCY ROOM IF YOU HAVE ANY OF THE FOLLOWING:      Difficulty breathing              Chills and/or fever over 101 F   Persistent vomiting and/or vomiting blood   Severe abdominal pain   Severe chest pain   Black, tarry stools   Bleeding- more than one tablespoon   Any  other symptom or condition that you feel may need urgent attention  Your doctor recommends these additional instructions:  If any biopsies were taken, your doctors clinic will contact you in 1 to 2   weeks with any results.  - Discharge patient to home.   - Resume previous diet.   - Continue present medications.   - Await pathology results.   - Repeat colonoscopy is not recommended based on age  For questions, problems or results please call your physician - Kam Barba MD at Work:  [phone].  [facility], EMERGENCY ROOM PHONE NUMBER: [phone]  IF A COMPLICATION OR EMERGENCY SITUATION ARISES AND YOU ARE UNABLE TO REACH   YOUR PHYSICIAN - GO DIRECTLY TO THE EMERGENCY ROOM.  Kam Barba MD  8/13/2018 9:43:07 AM  This report has been verified and signed electronically.  [employer]

## 2018-08-13 NOTE — PLAN OF CARE
AVS reviewed with pt and wife. Pt and wife verbalize understanding of all instructions, medications, and follow-up appointments. Pt stable, tolerating fluids, no complaints noted. Pt appropriate for discharge at this time.

## 2018-08-13 NOTE — ANESTHESIA POSTPROCEDURE EVALUATION
"Anesthesia Post Evaluation    Patient: Dariel Urbina    Procedure(s) Performed: Procedure(s) (LRB):  COLONOSCOPY (N/A)    Final Anesthesia Type: general  Patient location during evaluation: PACU  Patient participation: Yes- Able to Participate  Level of consciousness: awake and alert  Post-procedure vital signs: reviewed and stable  Pain management: adequate  Airway patency: patent  PONV status at discharge: No PONV  Anesthetic complications: no      Cardiovascular status: blood pressure returned to baseline  Respiratory status: unassisted  Hydration status: euvolemic  Follow-up not needed.        Visit Vitals  BP (!) 145/51 (BP Location: Left arm, Patient Position: Lying)   Pulse (!) 56   Temp 36.3 °C (97.4 °F) (Temporal)   Resp 16   Ht 5' 5" (1.651 m)   Wt 86.6 kg (191 lb)   SpO2 96%   BMI 31.78 kg/m²       Pain/Sanjeev Score: Pain Assessment Performed: Yes (8/13/2018 10:31 AM)  Presence of Pain: denies (8/13/2018 10:31 AM)  Sanjeev Score: 9 (8/13/2018 10:15 AM)        "

## 2018-08-15 ENCOUNTER — TELEPHONE (OUTPATIENT)
Dept: GASTROENTEROLOGY | Facility: CLINIC | Age: 78
End: 2018-08-15

## 2018-08-15 NOTE — TELEPHONE ENCOUNTER
"----- Message from Kam Barba MD sent at 8/15/2018  2:01 PM CDT -----  Can you let him know "The polyp we removed at your colonoscopy were adenomatous" .  They have been completely removed and pose no threat to you. He may no longer need colorectal screening as he has " aged out".  "

## 2018-08-17 ENCOUNTER — ANTI-COAG VISIT (OUTPATIENT)
Dept: CARDIOLOGY | Facility: CLINIC | Age: 78
End: 2018-08-17
Payer: MEDICARE

## 2018-08-17 DIAGNOSIS — Z95.2 H/O MECHANICAL AORTIC VALVE REPLACEMENT: ICD-10-CM

## 2018-08-17 DIAGNOSIS — Z79.01 LONG TERM CURRENT USE OF ANTICOAGULANT THERAPY: ICD-10-CM

## 2018-08-17 LAB — INR PPP: 1.4 (ref 2–3)

## 2018-08-17 PROCEDURE — 99211 OFF/OP EST MAY X REQ PHY/QHP: CPT | Mod: 25,S$GLB,,

## 2018-08-17 PROCEDURE — 85610 PROTHROMBIN TIME: CPT | Mod: QW,S$GLB,, | Performed by: INTERNAL MEDICINE

## 2018-08-17 RX ORDER — ENOXAPARIN SODIUM 150 MG/ML
120 INJECTION SUBCUTANEOUS
Qty: 10 SYRINGE | Refills: 1 | Status: SHIPPED | OUTPATIENT
Start: 2018-08-17 | End: 2020-01-14

## 2018-08-17 NOTE — PROGRESS NOTES
INR low today.  Follow up post colonoscopy. Patient will remain on Lovenox 120mg every 24 hours (am injection) 8/17 - 8/19, he will resume his normal dosing of Coumadin, follow up 8/20 at [facility] for blood draw.  Lovenox refill sent to patient's pharmacy of choice per MICHAEL Lundy.  Bruising from use but denies bleeding or other issues. MICHAEL Lundy Pharm D assisted with dosing

## 2018-08-20 ENCOUNTER — LAB VISIT (OUTPATIENT)
Dept: LAB | Facility: HOSPITAL | Age: 78
End: 2018-08-20
Payer: MEDICARE

## 2018-08-20 ENCOUNTER — ANTI-COAG VISIT (OUTPATIENT)
Dept: CARDIOLOGY | Facility: CLINIC | Age: 78
End: 2018-08-20

## 2018-08-20 DIAGNOSIS — Z79.01 LONG TERM CURRENT USE OF ANTICOAGULANT THERAPY: ICD-10-CM

## 2018-08-20 DIAGNOSIS — Z95.2 H/O MECHANICAL AORTIC VALVE REPLACEMENT: ICD-10-CM

## 2018-08-20 LAB
INR PPP: 1.9
PROTHROMBIN TIME: 19.4 SEC

## 2018-08-20 PROCEDURE — 36415 COLL VENOUS BLD VENIPUNCTURE: CPT

## 2018-08-20 PROCEDURE — 85610 PROTHROMBIN TIME: CPT

## 2018-09-05 ENCOUNTER — ANTI-COAG VISIT (OUTPATIENT)
Dept: CARDIOLOGY | Facility: CLINIC | Age: 78
End: 2018-09-05
Payer: MEDICARE

## 2018-09-05 DIAGNOSIS — Z79.01 LONG TERM CURRENT USE OF ANTICOAGULANT THERAPY: Primary | ICD-10-CM

## 2018-09-05 DIAGNOSIS — Z95.2 H/O MECHANICAL AORTIC VALVE REPLACEMENT: ICD-10-CM

## 2018-09-05 LAB — INR PPP: 3.1 (ref 2–3)

## 2018-09-05 PROCEDURE — 99211 OFF/OP EST MAY X REQ PHY/QHP: CPT | Mod: S$PBB,25,,

## 2018-09-05 PROCEDURE — 85610 PROTHROMBIN TIME: CPT | Mod: PBBFAC

## 2018-09-05 NOTE — PROGRESS NOTES
Quick follow-up for slightly low INR 8/20. INR slightly elevated today. Patient with bruises on body from use. Denies any bleeding or changes. Will give reduced dose today and then re challenge weekly dose until follow-up in 2 weeks. Advised him to call with any changes or concerns.

## 2018-09-20 ENCOUNTER — ANTI-COAG VISIT (OUTPATIENT)
Dept: CARDIOLOGY | Facility: CLINIC | Age: 78
End: 2018-09-20
Payer: MEDICARE

## 2018-09-20 DIAGNOSIS — Z79.01 LONG TERM CURRENT USE OF ANTICOAGULANT THERAPY: Primary | ICD-10-CM

## 2018-09-20 DIAGNOSIS — Z95.2 H/O MECHANICAL AORTIC VALVE REPLACEMENT: ICD-10-CM

## 2018-09-20 LAB — INR PPP: 2.2 (ref 2–3)

## 2018-09-20 PROCEDURE — 85610 PROTHROMBIN TIME: CPT | Mod: PBBFAC

## 2018-09-20 PROCEDURE — 99211 OFF/OP EST MAY X REQ PHY/QHP: CPT | Mod: S$PBB,25,,

## 2018-09-20 NOTE — PROGRESS NOTES
I have reviewed the chart and recent INR.  I agree with the plan outlined by MICHAEL Murray LPN.

## 2018-09-20 NOTE — PROGRESS NOTES
Quick follow up for elevated INR on 9/5. INR within therapeutic range today. Bruising noted to arms from use. Patient denies any bleeding other changes that would affect warfarin therapy. Will maintain current dose and follow up in 2 weeks. Patient advised to call coumadin clinic with any changes or concerns.

## 2018-10-04 ENCOUNTER — ANTI-COAG VISIT (OUTPATIENT)
Dept: CARDIOLOGY | Facility: CLINIC | Age: 78
End: 2018-10-04
Payer: MEDICARE

## 2018-10-04 DIAGNOSIS — Z79.01 LONG TERM CURRENT USE OF ANTICOAGULANT THERAPY: Primary | ICD-10-CM

## 2018-10-04 DIAGNOSIS — Z95.2 H/O MECHANICAL AORTIC VALVE REPLACEMENT: ICD-10-CM

## 2018-10-04 LAB — INR PPP: 2.3 (ref 2–3)

## 2018-10-04 PROCEDURE — 99211 OFF/OP EST MAY X REQ PHY/QHP: CPT | Mod: S$PBB,25,,

## 2018-10-04 PROCEDURE — 85610 PROTHROMBIN TIME: CPT | Mod: PBBFAC

## 2018-10-04 NOTE — PROGRESS NOTES
INR within normal range today. Patient with bruises on arms and hands from use, denies any bleeding or changes. Patient appears stable on this dose. He will continue this dose until follow-up in 4 weeks. I advised him to contact us with any changes or problems.

## 2018-10-17 RX ORDER — GLIMEPIRIDE 1 MG/1
TABLET ORAL
Qty: 90 TABLET | Refills: 2 | Status: SHIPPED | OUTPATIENT
Start: 2018-10-17 | End: 2019-07-11 | Stop reason: SDUPTHER

## 2018-10-24 DIAGNOSIS — I25.119 ATHEROSCLEROSIS OF NATIVE CORONARY ARTERY OF NATIVE HEART WITH ANGINA PECTORIS: ICD-10-CM

## 2018-10-24 DIAGNOSIS — I10 ESSENTIAL HYPERTENSION: ICD-10-CM

## 2018-10-24 DIAGNOSIS — E78.2 MIXED HYPERLIPIDEMIA: ICD-10-CM

## 2018-10-25 RX ORDER — ATORVASTATIN CALCIUM 80 MG/1
TABLET, FILM COATED ORAL
Qty: 90 TABLET | Refills: 3 | Status: SHIPPED | OUTPATIENT
Start: 2018-10-25 | End: 2019-11-11 | Stop reason: SDUPTHER

## 2018-10-25 RX ORDER — FUROSEMIDE 20 MG/1
TABLET ORAL
Qty: 90 TABLET | Refills: 4 | Status: SHIPPED | OUTPATIENT
Start: 2018-10-25 | End: 2020-01-06 | Stop reason: SDUPTHER

## 2018-11-01 ENCOUNTER — ANTI-COAG VISIT (OUTPATIENT)
Dept: CARDIOLOGY | Facility: CLINIC | Age: 78
End: 2018-11-01
Payer: MEDICARE

## 2018-11-01 DIAGNOSIS — Z95.2 H/O MECHANICAL AORTIC VALVE REPLACEMENT: ICD-10-CM

## 2018-11-01 DIAGNOSIS — Z79.01 LONG TERM CURRENT USE OF ANTICOAGULANT THERAPY: Primary | ICD-10-CM

## 2018-11-01 LAB — INR PPP: 2.1 (ref 2–3)

## 2018-11-01 PROCEDURE — 85610 PROTHROMBIN TIME: CPT | Mod: QW,HCNC,S$GLB, | Performed by: INTERNAL MEDICINE

## 2018-11-01 PROCEDURE — 99211 OFF/OP EST MAY X REQ PHY/QHP: CPT | Mod: 25,HCNC,S$GLB,

## 2018-11-01 NOTE — PROGRESS NOTES
INR within normal range today. Patient with bruises on arms from use, denies any bleeding or changes. Patient is stable on this dose. He will continue this dose until follow-up in 4 weeks. I advised him to contact us with any changes or problems.

## 2018-11-29 ENCOUNTER — ANTI-COAG VISIT (OUTPATIENT)
Dept: CARDIOLOGY | Facility: CLINIC | Age: 78
End: 2018-11-29
Payer: MEDICARE

## 2018-11-29 DIAGNOSIS — Z95.2 H/O MECHANICAL AORTIC VALVE REPLACEMENT: ICD-10-CM

## 2018-11-29 DIAGNOSIS — Z79.01 LONG TERM CURRENT USE OF ANTICOAGULANT THERAPY: Primary | ICD-10-CM

## 2018-11-29 LAB — INR PPP: 2.2 (ref 2–3)

## 2018-11-29 PROCEDURE — 85610 PROTHROMBIN TIME: CPT | Mod: QW,HCNC,S$GLB, | Performed by: INTERNAL MEDICINE

## 2018-11-29 PROCEDURE — 99211 OFF/OP EST MAY X REQ PHY/QHP: CPT | Mod: 25,HCNC,S$GLB,

## 2018-11-29 NOTE — PROGRESS NOTES
INR within normal range today. Patient with bruises on body from use, denies any bleeding or changes. Patient is stable on this dose. He will continue this dose until follow-up in 5 weeks. I advised him to contact us with any changes or problems.

## 2018-12-23 NOTE — ASSESSMENT & PLAN NOTE
- Known to have CKD stage III, with baseline GFR 30-60 and baseline sCr ~ 1.5-1.7  - Initial presentation with sCr 6.6 => 6.0   - Differential Diagnoses include pre-renal etiology given his history of acute diarrhea and decrease oral intake and slight improvement with low hydration.   - Will continue trending urine output and renal function    - Avoid NSAIDs, contrast, nephrotoxins, Monitor strict I/Os, daily weights  - Renally dose medications to current GFR  - Ordered renal ultrasonography to rule out obstruction or post renal etiology  - Consider more hydration (NaCl 1 L bolus), since he only received very gentle hydration of 75 cc/hr, don't overhydrate him in the setting of HFpEF.    17

## 2018-12-26 ENCOUNTER — HOSPITAL ENCOUNTER (OUTPATIENT)
Dept: RADIOLOGY | Facility: HOSPITAL | Age: 78
Discharge: HOME | End: 2018-12-26
Attending: INTERNAL MEDICINE
Payer: MEDICARE

## 2018-12-26 ENCOUNTER — OFFICE VISIT (OUTPATIENT)
Dept: CARDIOLOGY | Facility: CLINIC | Age: 78
End: 2018-12-26
Payer: MEDICARE

## 2018-12-26 VITALS
DIASTOLIC BLOOD PRESSURE: 86 MMHG | SYSTOLIC BLOOD PRESSURE: 163 MMHG | HEART RATE: 58 BPM | HEIGHT: 65 IN | BODY MASS INDEX: 32.47 KG/M2 | WEIGHT: 194.88 LBS | OXYGEN SATURATION: 97 %

## 2018-12-26 DIAGNOSIS — Z79.01 LONG TERM CURRENT USE OF ANTICOAGULANT THERAPY: ICD-10-CM

## 2018-12-26 DIAGNOSIS — I73.9 PVD (PERIPHERAL VASCULAR DISEASE): ICD-10-CM

## 2018-12-26 DIAGNOSIS — R20.0 NUMBNESS AND TINGLING IN BOTH HANDS: ICD-10-CM

## 2018-12-26 DIAGNOSIS — Z95.2 H/O MECHANICAL AORTIC VALVE REPLACEMENT: Primary | ICD-10-CM

## 2018-12-26 DIAGNOSIS — R20.2 NUMBNESS AND TINGLING IN BOTH HANDS: Primary | ICD-10-CM

## 2018-12-26 DIAGNOSIS — R20.0 NUMBNESS AND TINGLING IN BOTH HANDS: Primary | ICD-10-CM

## 2018-12-26 DIAGNOSIS — I25.118 CORONARY ARTERY DISEASE OF NATIVE ARTERY OF NATIVE HEART WITH STABLE ANGINA PECTORIS: ICD-10-CM

## 2018-12-26 DIAGNOSIS — N28.9 RENAL INSUFFICIENCY: ICD-10-CM

## 2018-12-26 DIAGNOSIS — R20.2 NUMBNESS AND TINGLING IN BOTH HANDS: ICD-10-CM

## 2018-12-26 PROCEDURE — 72050 X-RAY EXAM NECK SPINE 4/5VWS: CPT | Mod: TC,HCNC

## 2018-12-26 PROCEDURE — 99999 PR PBB SHADOW E&M-EST. PATIENT-LVL IV: CPT | Mod: PBBFAC,HCNC,, | Performed by: INTERNAL MEDICINE

## 2018-12-26 PROCEDURE — 3077F SYST BP >= 140 MM HG: CPT | Mod: CPTII,HCNC,S$GLB, | Performed by: INTERNAL MEDICINE

## 2018-12-26 PROCEDURE — 99212 OFFICE O/P EST SF 10 MIN: CPT | Mod: HCNC,S$GLB,, | Performed by: INTERNAL MEDICINE

## 2018-12-26 PROCEDURE — 3079F DIAST BP 80-89 MM HG: CPT | Mod: CPTII,HCNC,S$GLB, | Performed by: INTERNAL MEDICINE

## 2018-12-26 PROCEDURE — 1101F PT FALLS ASSESS-DOCD LE1/YR: CPT | Mod: CPTII,HCNC,S$GLB, | Performed by: INTERNAL MEDICINE

## 2018-12-26 PROCEDURE — 72050 X-RAY EXAM NECK SPINE 4/5VWS: CPT | Mod: 26,HCNC,, | Performed by: RADIOLOGY

## 2018-12-26 NOTE — PROGRESS NOTES
Subjective:    Patient ID:  Dariel Urbina is a 78 y.o. male who presents for follow-up of Aortic Stenosis      HPI  Still with intermittent effort angina that does not require sl NTG.  No resting symptoms.  Has ocassional bilateral          Hand numbness and has dropped things.  Review of Systems   Constitution: Negative for chills, decreased appetite and diaphoresis.   Eyes: Negative for blurred vision and double vision.   Cardiovascular: Positive for claudication and dyspnea on exertion. Negative for irregular heartbeat, leg swelling, near-syncope, orthopnea, palpitations, paroxysmal nocturnal dyspnea and syncope.   Respiratory: Positive for shortness of breath. Negative for cough.    Musculoskeletal: Positive for joint pain. Negative for arthritis, back pain, joint swelling, muscle cramps, muscle weakness, myalgias, neck pain and stiffness.   Gastrointestinal: Negative for diarrhea.   Neurological: Positive for paresthesias.   Psychiatric/Behavioral: Negative for altered mental status.        Objective:    Physical Exam   Constitutional: He is oriented to person, place, and time. He appears well-developed and well-nourished. No distress.   HENT:   Head: Normocephalic and atraumatic.   Eyes: Pupils are equal, round, and reactive to light.   Neck: Neck supple. No JVD present.   Cardiovascular: Normal rate and regular rhythm.   Murmur heard.  Pulmonary/Chest: Effort normal and breath sounds normal. No respiratory distress. He has no wheezes. He has no rales.   Musculoskeletal: He exhibits no edema or tenderness.   Neurological: He is alert and oriented to person, place, and time.   Skin: Skin is warm and dry. No rash noted. No erythema.   Psychiatric: He has a normal mood and affect. His behavior is normal. Judgment and thought content normal.         Assessment:       1. H/O mechanical aortic valve replacement    2. Long term current use of anticoagulant therapy    3. PVD (peripheral vascular disease)    4.  Renal insufficiency    5. Coronary artery disease of native artery of native heart with stable angina pectoris         Plan:       1.  C -spine Xray  2.  Routine follow-up.

## 2018-12-28 ENCOUNTER — OFFICE VISIT (OUTPATIENT)
Dept: NEPHROLOGY | Facility: CLINIC | Age: 78
End: 2018-12-28
Payer: MEDICARE

## 2018-12-28 VITALS
HEIGHT: 65 IN | HEART RATE: 63 BPM | WEIGHT: 196 LBS | SYSTOLIC BLOOD PRESSURE: 140 MMHG | OXYGEN SATURATION: 96 % | BODY MASS INDEX: 32.65 KG/M2 | DIASTOLIC BLOOD PRESSURE: 72 MMHG

## 2018-12-28 DIAGNOSIS — N18.30 CHRONIC KIDNEY DISEASE, STAGE III (MODERATE): ICD-10-CM

## 2018-12-28 DIAGNOSIS — E11.22 CONTROLLED TYPE 2 DIABETES MELLITUS WITH STAGE 3 CHRONIC KIDNEY DISEASE, WITHOUT LONG-TERM CURRENT USE OF INSULIN: Primary | ICD-10-CM

## 2018-12-28 DIAGNOSIS — N18.30 ANEMIA OF CHRONIC RENAL FAILURE, STAGE 3 (MODERATE): ICD-10-CM

## 2018-12-28 DIAGNOSIS — D63.1 ANEMIA OF CHRONIC RENAL FAILURE, STAGE 3 (MODERATE): ICD-10-CM

## 2018-12-28 DIAGNOSIS — N18.30 CONTROLLED TYPE 2 DIABETES MELLITUS WITH STAGE 3 CHRONIC KIDNEY DISEASE, WITHOUT LONG-TERM CURRENT USE OF INSULIN: Primary | ICD-10-CM

## 2018-12-28 DIAGNOSIS — E87.5 HYPERKALEMIA: ICD-10-CM

## 2018-12-28 DIAGNOSIS — I12.9 HYPERTENSIVE KIDNEY DISEASE WITH CHRONIC KIDNEY DISEASE, STAGE 1-4 OR UNSPECIFIED CHRONIC KIDNEY DISEASE: ICD-10-CM

## 2018-12-28 DIAGNOSIS — N25.81 HYPERPARATHYROIDISM DUE TO RENAL INSUFFICIENCY: ICD-10-CM

## 2018-12-28 PROCEDURE — 1101F PT FALLS ASSESS-DOCD LE1/YR: CPT | Mod: CPTII,HCNC,S$GLB, | Performed by: INTERNAL MEDICINE

## 2018-12-28 PROCEDURE — 3078F DIAST BP <80 MM HG: CPT | Mod: CPTII,HCNC,S$GLB, | Performed by: INTERNAL MEDICINE

## 2018-12-28 PROCEDURE — 99214 OFFICE O/P EST MOD 30 MIN: CPT | Mod: HCNC,S$GLB,, | Performed by: INTERNAL MEDICINE

## 2018-12-28 PROCEDURE — 99499 UNLISTED E&M SERVICE: CPT | Mod: HCNC,S$GLB,, | Performed by: INTERNAL MEDICINE

## 2018-12-28 PROCEDURE — 99999 PR PBB SHADOW E&M-EST. PATIENT-LVL II: CPT | Mod: PBBFAC,HCNC,, | Performed by: INTERNAL MEDICINE

## 2018-12-28 PROCEDURE — 3077F SYST BP >= 140 MM HG: CPT | Mod: CPTII,HCNC,S$GLB, | Performed by: INTERNAL MEDICINE

## 2018-12-31 NOTE — PROGRESS NOTES
Subjective:       Patient ID: Dariel Urbina is a 78 y.o. White male who presents for follow-up evaluation of Chronic Kidney Disease    HPI      Mr. Urbina is a 78 year old man with past medical history of hypertension presenting for follow up of chronic kidney disease.  Patient reports chronic shortness of breath with exertion, otherwise denies any symptoms, no fever, chest pain, abdominal pain, diarrhea, dysuria/hematuria. He reports blood pressure usually well-controlled, less than 130 systolic.  He reports adherence to low potassium diet most of the time.    Review of Systems   Constitutional: Negative for appetite change, fatigue and fever.   Respiratory: Negative for cough and shortness of breath.    Cardiovascular: Negative for chest pain and leg swelling.   Gastrointestinal: Negative for abdominal pain, constipation, diarrhea, nausea and vomiting.   Genitourinary: Negative for dysuria, flank pain, frequency, hematuria and urgency.   Musculoskeletal: Negative for arthralgias, back pain and joint swelling.   Skin: Negative for rash.   Neurological: Negative for dizziness and light-headedness.   All other systems reviewed and are negative.      Objective:      Physical Exam   Constitutional: He appears well-developed and well-nourished.   Cardiovascular: Normal rate and regular rhythm. Exam reveals no gallop and no friction rub.   No murmur heard.  Pulmonary/Chest: Effort normal and breath sounds normal. No respiratory distress. He has no wheezes. He has no rales.   Musculoskeletal: He exhibits no edema.   Neurological: He is alert.   Skin: Skin is warm and dry. No rash noted.   Vitals reviewed.      Assessment:       1. Controlled type 2 diabetes mellitus with stage 3 chronic kidney disease, without long-term current use of insulin    2. Chronic kidney disease, stage III (moderate)    3. Hypertensive kidney disease with chronic kidney disease, stage 1-4 or unspecified chronic kidney disease    4. Anemia  of chronic renal failure, stage 3 (moderate)    5. Hyperparathyroidism due to renal insufficiency    6. Hyperkalemia        Plan:      Mr. Urbina is a 78 year old man with past medical history of hypertension presenting for follow up of chronic kidney disease stage III.  Creatinine within baseline range, improved since last visit, will continue to trend for now. Stressed importance of blood pressure/glycemic control to prevent any further progression of kidney disease, patient voiced understanding.  Encouraged weight loss and exercise as tolerated, along with fluid intake.   - Hypertension: blood pressure at goal, continue current meds   - Anemia: Hgb at goal, no indication for erythropoetin therapy  - Bone/mineral metabolism: patient with secondary hyperparathyroidism, PTH at goal for stage CKD  - Hyperkalemia: improved, again discussed low potassium diet, patient previously given handout.      Return to clinic 5-6 months pending renal panel within a month, then with renal/heme panel, iron/TIBC/ferritin, urinalysis/culture, urine protein/creatinine ratio prior to next visit

## 2019-01-03 ENCOUNTER — ANTI-COAG VISIT (OUTPATIENT)
Dept: CARDIOLOGY | Facility: CLINIC | Age: 79
End: 2019-01-03
Payer: MEDICARE

## 2019-01-03 DIAGNOSIS — Z95.2 H/O MECHANICAL AORTIC VALVE REPLACEMENT: ICD-10-CM

## 2019-01-03 DIAGNOSIS — Z79.01 LONG TERM CURRENT USE OF ANTICOAGULANT THERAPY: Primary | ICD-10-CM

## 2019-01-03 LAB — INR PPP: 2.4 (ref 2–3)

## 2019-01-03 PROCEDURE — 85610 PROTHROMBIN TIME: CPT | Mod: QW,HCNC,S$GLB, | Performed by: PHARMACIST

## 2019-01-03 PROCEDURE — 99211 OFF/OP EST MAY X REQ PHY/QHP: CPT | Mod: 25,HCNC,S$GLB, | Performed by: PHARMACIST

## 2019-01-03 PROCEDURE — 85610 POCT INR: ICD-10-PCS | Mod: QW,HCNC,S$GLB, | Performed by: PHARMACIST

## 2019-01-03 PROCEDURE — 99211 PR OFFICE/OUTPT VISIT, EST, LEVL I: ICD-10-PCS | Mod: 25,HCNC,S$GLB, | Performed by: PHARMACIST

## 2019-01-03 NOTE — PROGRESS NOTES
Patient was re-educated on situations that would require placing a call to the coumadin clinic, including bleeding or unusual bruising issues, changes in health, diet or medications, upcoming procedures that require warfarin interruption, and missed coumadin dose(s). Patient expressed understanding that avoidance of consistency with these parameters could cause fluctuations in INR, leading to more frequent visits and increase risk of adverse events.     Patient denies any changes in diet, medications, or health that would effect warfarin therapy.

## 2019-01-18 ENCOUNTER — PES CALL (OUTPATIENT)
Dept: ADMINISTRATIVE | Facility: CLINIC | Age: 79
End: 2019-01-18

## 2019-01-28 NOTE — ASSESSMENT & PLAN NOTE
PERIPHERAL VASCULAR DISEASE    Takes high-intensity statin. Also has nitroglycerin 0.4 mg PRN. Denies every requiring nitro. Nil aspirin in the outpatient setting.   - Aspirin 81 mg  - Plavix 300 then 75 mg  - Atorvastatin 80 mg QD  - Isosorbide mononitrate 120 mg qd           no fever and no chills.

## 2019-02-14 ENCOUNTER — ANTI-COAG VISIT (OUTPATIENT)
Dept: CARDIOLOGY | Facility: CLINIC | Age: 79
End: 2019-02-14
Payer: MEDICARE

## 2019-02-14 DIAGNOSIS — Z79.01 LONG TERM CURRENT USE OF ANTICOAGULANT THERAPY: Primary | ICD-10-CM

## 2019-02-14 DIAGNOSIS — Z95.2 H/O MECHANICAL AORTIC VALVE REPLACEMENT: ICD-10-CM

## 2019-02-14 LAB — INR PPP: 2.1 (ref 2–3)

## 2019-02-14 PROCEDURE — 85610 POCT INR: ICD-10-PCS | Mod: QW,HCNC,S$GLB, | Performed by: PHARMACIST

## 2019-02-14 PROCEDURE — 85610 PROTHROMBIN TIME: CPT | Mod: QW,HCNC,S$GLB, | Performed by: PHARMACIST

## 2019-03-28 ENCOUNTER — ANTI-COAG VISIT (OUTPATIENT)
Dept: CARDIOLOGY | Facility: CLINIC | Age: 79
End: 2019-03-28
Payer: MEDICARE

## 2019-03-28 DIAGNOSIS — Z95.2 H/O MECHANICAL AORTIC VALVE REPLACEMENT: ICD-10-CM

## 2019-03-28 DIAGNOSIS — Z79.01 LONG TERM CURRENT USE OF ANTICOAGULANT THERAPY: Primary | ICD-10-CM

## 2019-03-28 LAB — INR PPP: 2.6 (ref 2–3)

## 2019-03-28 PROCEDURE — 85610 POCT INR: ICD-10-PCS | Mod: QW,HCNC,S$GLB, | Performed by: INTERNAL MEDICINE

## 2019-03-28 PROCEDURE — 85610 PROTHROMBIN TIME: CPT | Mod: QW,HCNC,S$GLB, | Performed by: INTERNAL MEDICINE

## 2019-05-03 ENCOUNTER — TELEPHONE (OUTPATIENT)
Dept: INTERNAL MEDICINE | Facility: CLINIC | Age: 79
End: 2019-05-03

## 2019-05-03 DIAGNOSIS — M10.9 GOUT, UNSPECIFIED CAUSE, UNSPECIFIED CHRONICITY, UNSPECIFIED SITE: Primary | ICD-10-CM

## 2019-05-03 RX ORDER — COLCHICINE 0.6 MG/1
0.6 TABLET ORAL 2 TIMES DAILY PRN
Qty: 60 TABLET | Refills: 0 | Status: SHIPPED | OUTPATIENT
Start: 2019-05-03 | End: 2019-05-30 | Stop reason: SDUPTHER

## 2019-05-03 NOTE — TELEPHONE ENCOUNTER
----- Message from Gi Gore sent at 5/3/2019  8:04 AM CDT -----  Contact: [phone]  Patient is requesting a call from the office in regards to his gout in his foot. His foot is swollen. He is requesting if MD can prescribe him something to help.    [facility]/pharmacy #[id number] - [place], [place] - [address]   [phone] (Phone)  [phone] (Fax)    Please advise, thanks

## 2019-05-03 NOTE — TELEPHONE ENCOUNTER
Please call.  I sent a prescription in to his [facility] pharmacy for colchicine for his gout.  He can take 1 twice a day for the gout.  Let me know how this works.

## 2019-05-09 ENCOUNTER — ANTI-COAG VISIT (OUTPATIENT)
Dept: CARDIOLOGY | Facility: CLINIC | Age: 79
End: 2019-05-09
Payer: MEDICARE

## 2019-05-09 DIAGNOSIS — Z79.01 LONG TERM CURRENT USE OF ANTICOAGULANT THERAPY: Primary | ICD-10-CM

## 2019-05-09 DIAGNOSIS — Z95.2 H/O MECHANICAL AORTIC VALVE REPLACEMENT: ICD-10-CM

## 2019-05-09 LAB — INR PPP: 2.2 (ref 2–3)

## 2019-05-09 PROCEDURE — 93793 ANTICOAG MGMT PT WARFARIN: CPT | Mod: HCNC,S$GLB,, | Performed by: PHARMACIST

## 2019-05-09 PROCEDURE — 93793 PR ANTICOAGULANT MGMT FOR PT TAKING WARFARIN: ICD-10-PCS | Mod: HCNC,S$GLB,, | Performed by: PHARMACIST

## 2019-05-09 PROCEDURE — 85610 PROTHROMBIN TIME: CPT | Mod: QW,HCNC,S$GLB, | Performed by: INTERNAL MEDICINE

## 2019-05-09 PROCEDURE — 85610 POCT INR: ICD-10-PCS | Mod: QW,HCNC,S$GLB, | Performed by: INTERNAL MEDICINE

## 2019-05-23 DIAGNOSIS — Z79.01 LONG TERM CURRENT USE OF ANTICOAGULANT THERAPY: ICD-10-CM

## 2019-05-23 DIAGNOSIS — Z95.2 H/O MECHANICAL AORTIC VALVE REPLACEMENT: ICD-10-CM

## 2019-05-24 RX ORDER — WARFARIN SODIUM 5 MG/1
TABLET ORAL
Qty: 180 TABLET | Refills: 3 | Status: SHIPPED | OUTPATIENT
Start: 2019-05-24 | End: 2020-05-18

## 2019-05-30 DIAGNOSIS — M10.9 GOUT, UNSPECIFIED CAUSE, UNSPECIFIED CHRONICITY, UNSPECIFIED SITE: ICD-10-CM

## 2019-05-30 RX ORDER — COLCHICINE 0.6 MG/1
TABLET, FILM COATED ORAL
Qty: 60 TABLET | Refills: 0 | Status: SHIPPED | OUTPATIENT
Start: 2019-05-30 | End: 2020-01-14

## 2019-06-13 DIAGNOSIS — I25.10 CORONARY ARTERY DISEASE, ANGINA PRESENCE UNSPECIFIED, UNSPECIFIED VESSEL OR LESION TYPE, UNSPECIFIED WHETHER NATIVE OR TRANSPLANTED HEART: ICD-10-CM

## 2019-06-14 RX ORDER — ISOSORBIDE MONONITRATE 120 MG/1
TABLET, EXTENDED RELEASE ORAL
Qty: 90 TABLET | Refills: 4 | Status: SHIPPED | OUTPATIENT
Start: 2019-06-14 | End: 2020-07-20 | Stop reason: SDUPTHER

## 2019-06-20 ENCOUNTER — ANTI-COAG VISIT (OUTPATIENT)
Dept: CARDIOLOGY | Facility: CLINIC | Age: 79
End: 2019-06-20
Payer: MEDICARE

## 2019-06-20 DIAGNOSIS — Z79.01 LONG TERM CURRENT USE OF ANTICOAGULANT THERAPY: Primary | ICD-10-CM

## 2019-06-20 DIAGNOSIS — Z95.2 H/O MECHANICAL AORTIC VALVE REPLACEMENT: ICD-10-CM

## 2019-06-20 LAB — INR PPP: 2.7 (ref 2–3)

## 2019-06-20 PROCEDURE — 85610 POCT INR: ICD-10-PCS | Mod: QW,HCNC,S$GLB, | Performed by: INTERNAL MEDICINE

## 2019-06-20 PROCEDURE — 85610 PROTHROMBIN TIME: CPT | Mod: QW,HCNC,S$GLB, | Performed by: INTERNAL MEDICINE

## 2019-06-20 PROCEDURE — 93793 PR ANTICOAGULANT MGMT FOR PT TAKING WARFARIN: ICD-10-PCS | Mod: HCNC,S$GLB,, | Performed by: PHARMACIST

## 2019-06-20 PROCEDURE — 93793 ANTICOAG MGMT PT WARFARIN: CPT | Mod: HCNC,S$GLB,, | Performed by: PHARMACIST

## 2019-07-02 DIAGNOSIS — M10.9 GOUT, UNSPECIFIED CAUSE, UNSPECIFIED CHRONICITY, UNSPECIFIED SITE: ICD-10-CM

## 2019-07-03 RX ORDER — ALLOPURINOL 100 MG/1
TABLET ORAL
Qty: 90 TABLET | Refills: 3 | Status: SHIPPED | OUTPATIENT
Start: 2019-07-03 | End: 2020-06-24 | Stop reason: SDUPTHER

## 2019-07-11 ENCOUNTER — TELEPHONE (OUTPATIENT)
Dept: INTERNAL MEDICINE | Facility: CLINIC | Age: 79
End: 2019-07-11

## 2019-07-11 DIAGNOSIS — E78.2 MIXED HYPERLIPIDEMIA: ICD-10-CM

## 2019-07-11 DIAGNOSIS — N18.30 ANEMIA OF CHRONIC RENAL FAILURE, STAGE 3 (MODERATE): Primary | ICD-10-CM

## 2019-07-11 DIAGNOSIS — D63.1 ANEMIA OF CHRONIC RENAL FAILURE, STAGE 3 (MODERATE): Primary | ICD-10-CM

## 2019-07-11 DIAGNOSIS — E11.51 TYPE 2 DIABETES MELLITUS WITH DIABETIC PERIPHERAL ANGIOPATHY WITHOUT GANGRENE, WITHOUT LONG-TERM CURRENT USE OF INSULIN: ICD-10-CM

## 2019-07-11 NOTE — TELEPHONE ENCOUNTER
----- Message from Abril Wynne sent at 7/11/2019  4:15 PM CDT -----  Contact: [phone]  Type: Rx    Name of medication(s):  glimepiride (AMARYL) 1 MG tablet    Is this a refill? New rx? Refill      Who prescribed medication?    Pharmacy Name, Phone, & Location: [facility]  Mail order pharmacy     Comments: please call and advise, Thanks

## 2019-07-12 RX ORDER — GLIMEPIRIDE 1 MG/1
1 TABLET ORAL EVERY MORNING
Qty: 30 TABLET | Refills: 0 | Status: SHIPPED | OUTPATIENT
Start: 2019-07-12 | End: 2019-08-16 | Stop reason: SDUPTHER

## 2019-07-12 NOTE — TELEPHONE ENCOUNTER
I refilled his medication for 30 days but he has not had labs or follow-up with you for over a year.  He is diabetic and needs routine labs, etc.

## 2019-07-14 NOTE — PROGRESS NOTES
I have reviewed the nurse's initial findings and agree with their assessment.  I have   assessed the patient in clinic to devise care plan.    
Quick follow up from low INR on 5/17 and close monitoring for new dose. INR in therapeutic range. Patient reports bruising to the arms from use. No bleeding or other changes. Will maintain new increased dose until follow up in 12 days. Advised patient to call with any changes or concerns.   
14-Jul-2019

## 2019-07-15 NOTE — TELEPHONE ENCOUNTER
Please get labs on him and make him an appointment to see me 7/24 at 8 am.  I know my schedule is closed that day, but open it up  For him

## 2019-07-17 ENCOUNTER — LAB VISIT (OUTPATIENT)
Dept: LAB | Facility: HOSPITAL | Age: 79
End: 2019-07-17
Attending: INTERNAL MEDICINE
Payer: MEDICARE

## 2019-07-17 DIAGNOSIS — E11.51 TYPE 2 DIABETES MELLITUS WITH DIABETIC PERIPHERAL ANGIOPATHY WITHOUT GANGRENE, WITHOUT LONG-TERM CURRENT USE OF INSULIN: ICD-10-CM

## 2019-07-17 DIAGNOSIS — N18.30 ANEMIA OF CHRONIC RENAL FAILURE, STAGE 3 (MODERATE): ICD-10-CM

## 2019-07-17 DIAGNOSIS — D63.1 ANEMIA OF CHRONIC RENAL FAILURE, STAGE 3 (MODERATE): ICD-10-CM

## 2019-07-17 LAB
ALBUMIN SERPL BCP-MCNC: 3.8 G/DL (ref 3.5–5.2)
ALP SERPL-CCNC: 72 U/L (ref 55–135)
ALT SERPL W/O P-5'-P-CCNC: 19 U/L (ref 10–44)
ANION GAP SERPL CALC-SCNC: 7 MMOL/L (ref 8–16)
AST SERPL-CCNC: 23 U/L (ref 10–40)
BASOPHILS # BLD AUTO: 0.05 K/UL (ref 0–0.2)
BASOPHILS NFR BLD: 0.9 % (ref 0–1.9)
BILIRUB SERPL-MCNC: 0.6 MG/DL (ref 0.1–1)
BUN SERPL-MCNC: 40 MG/DL (ref 8–23)
CALCIUM SERPL-MCNC: 10.3 MG/DL (ref 8.7–10.5)
CHLORIDE SERPL-SCNC: 106 MMOL/L (ref 95–110)
CHOLEST SERPL-MCNC: 194 MG/DL (ref 120–199)
CHOLEST/HDLC SERPL: 4.9 {RATIO} (ref 2–5)
CO2 SERPL-SCNC: 27 MMOL/L (ref 23–29)
CREAT SERPL-MCNC: 1.5 MG/DL (ref 0.5–1.4)
DIFFERENTIAL METHOD: ABNORMAL
EOSINOPHIL # BLD AUTO: 0.3 K/UL (ref 0–0.5)
EOSINOPHIL NFR BLD: 4.3 % (ref 0–8)
ERYTHROCYTE [DISTWIDTH] IN BLOOD BY AUTOMATED COUNT: 13.8 % (ref 11.5–14.5)
EST. GFR  (AFRICAN AMERICAN): 51.2 ML/MIN/1.73 M^2
EST. GFR  (NON AFRICAN AMERICAN): 44.3 ML/MIN/1.73 M^2
ESTIMATED AVG GLUCOSE: 111 MG/DL (ref 68–131)
GLUCOSE SERPL-MCNC: 95 MG/DL (ref 70–110)
HBA1C MFR BLD HPLC: 5.5 % (ref 4–5.6)
HCT VFR BLD AUTO: 39.6 % (ref 40–54)
HDLC SERPL-MCNC: 40 MG/DL (ref 40–75)
HDLC SERPL: 20.6 % (ref 20–50)
HGB BLD-MCNC: 12.8 G/DL (ref 14–18)
IMM GRANULOCYTES # BLD AUTO: 0.03 K/UL (ref 0–0.04)
IMM GRANULOCYTES NFR BLD AUTO: 0.5 % (ref 0–0.5)
LDLC SERPL CALC-MCNC: 98.6 MG/DL (ref 63–159)
LYMPHOCYTES # BLD AUTO: 1.3 K/UL (ref 1–4.8)
LYMPHOCYTES NFR BLD: 21.8 % (ref 18–48)
MCH RBC QN AUTO: 30.8 PG (ref 27–31)
MCHC RBC AUTO-ENTMCNC: 32.3 G/DL (ref 32–36)
MCV RBC AUTO: 95 FL (ref 82–98)
MONOCYTES # BLD AUTO: 0.7 K/UL (ref 0.3–1)
MONOCYTES NFR BLD: 12.4 % (ref 4–15)
NEUTROPHILS # BLD AUTO: 3.5 K/UL (ref 1.8–7.7)
NEUTROPHILS NFR BLD: 60.1 % (ref 38–73)
NONHDLC SERPL-MCNC: 154 MG/DL
NRBC BLD-RTO: 0 /100 WBC
PLATELET # BLD AUTO: 161 K/UL (ref 150–350)
PMV BLD AUTO: 11.5 FL (ref 9.2–12.9)
POTASSIUM SERPL-SCNC: 4.5 MMOL/L (ref 3.5–5.1)
PROT SERPL-MCNC: 7.3 G/DL (ref 6–8.4)
RBC # BLD AUTO: 4.15 M/UL (ref 4.6–6.2)
SODIUM SERPL-SCNC: 140 MMOL/L (ref 136–145)
TRIGL SERPL-MCNC: 277 MG/DL (ref 30–150)
WBC # BLD AUTO: 5.88 K/UL (ref 3.9–12.7)

## 2019-07-17 PROCEDURE — 85025 COMPLETE CBC W/AUTO DIFF WBC: CPT | Mod: HCNC

## 2019-07-17 PROCEDURE — 80053 COMPREHEN METABOLIC PANEL: CPT | Mod: HCNC

## 2019-07-17 PROCEDURE — 83036 HEMOGLOBIN GLYCOSYLATED A1C: CPT | Mod: HCNC

## 2019-07-17 PROCEDURE — 80061 LIPID PANEL: CPT | Mod: HCNC

## 2019-07-17 PROCEDURE — 36415 COLL VENOUS BLD VENIPUNCTURE: CPT | Mod: HCNC

## 2019-07-22 DIAGNOSIS — I15.0 RENOVASCULAR HYPERTENSION: ICD-10-CM

## 2019-07-22 RX ORDER — NIFEDIPINE 60 MG/1
60 TABLET, EXTENDED RELEASE ORAL DAILY
Qty: 90 TABLET | Refills: 4 | Status: SHIPPED | OUTPATIENT
Start: 2019-07-22 | End: 2020-09-23

## 2019-07-22 RX ORDER — NIFEDIPINE 60 MG/1
60 TABLET, EXTENDED RELEASE ORAL DAILY
Qty: 90 TABLET | Refills: 4 | OUTPATIENT
Start: 2019-07-22

## 2019-07-22 NOTE — TELEPHONE ENCOUNTER
----- Message from Katelin Kemp sent at 7/22/2019 11:36 AM CDT -----  Contact: PT wife   Pt needs prescription refill, only has 6 left, Please contact [facility] as well because they are waiting on the office to contact them     NIFEdipine (PROCARDIA-XL) 60 MG (OSM) 24 hr tablet

## 2019-07-22 NOTE — TELEPHONE ENCOUNTER
Previously seen in Urgent Care / Same Day Appointment > 30 days ago.  Please should follow with PCP (or establish PCP if none) for future refills.

## 2019-07-24 ENCOUNTER — OFFICE VISIT (OUTPATIENT)
Dept: INTERNAL MEDICINE | Facility: CLINIC | Age: 79
End: 2019-07-24
Payer: MEDICARE

## 2019-07-24 VITALS
WEIGHT: 195.75 LBS | SYSTOLIC BLOOD PRESSURE: 130 MMHG | BODY MASS INDEX: 31.46 KG/M2 | DIASTOLIC BLOOD PRESSURE: 62 MMHG | HEIGHT: 66 IN | HEART RATE: 63 BPM

## 2019-07-24 VITALS
HEIGHT: 66 IN | DIASTOLIC BLOOD PRESSURE: 62 MMHG | HEART RATE: 63 BPM | OXYGEN SATURATION: 97 % | SYSTOLIC BLOOD PRESSURE: 130 MMHG | BODY MASS INDEX: 31.46 KG/M2 | WEIGHT: 195.75 LBS

## 2019-07-24 DIAGNOSIS — Z90.49 HISTORY OF COLON RESECTION: ICD-10-CM

## 2019-07-24 DIAGNOSIS — D63.1 ANEMIA OF CHRONIC RENAL FAILURE, STAGE 3 (MODERATE): ICD-10-CM

## 2019-07-24 DIAGNOSIS — Z86.010 HX OF COLONIC POLYP: ICD-10-CM

## 2019-07-24 DIAGNOSIS — E66.9 OBESITY, DIABETES, AND HYPERTENSION SYNDROME: ICD-10-CM

## 2019-07-24 DIAGNOSIS — I25.810 ATHEROSCLEROSIS OF CORONARY ARTERY BYPASS GRAFT OF NATIVE HEART WITHOUT ANGINA PECTORIS: ICD-10-CM

## 2019-07-24 DIAGNOSIS — I27.9 PULMONARY HEART DISEASE: ICD-10-CM

## 2019-07-24 DIAGNOSIS — I77.9 BILATERAL CAROTID ARTERY DISEASE, UNSPECIFIED TYPE: ICD-10-CM

## 2019-07-24 DIAGNOSIS — Z79.01 LONG TERM CURRENT USE OF ANTICOAGULANT THERAPY: ICD-10-CM

## 2019-07-24 DIAGNOSIS — E11.69 OBESITY, DIABETES, AND HYPERTENSION SYNDROME: ICD-10-CM

## 2019-07-24 DIAGNOSIS — N25.81 HYPERPARATHYROIDISM DUE TO RENAL INSUFFICIENCY: ICD-10-CM

## 2019-07-24 DIAGNOSIS — E11.22 TYPE 2 DIABETES MELLITUS WITH STAGE 3 CHRONIC KIDNEY DISEASE, WITHOUT LONG-TERM CURRENT USE OF INSULIN: ICD-10-CM

## 2019-07-24 DIAGNOSIS — N18.30 ANEMIA OF CHRONIC RENAL FAILURE, STAGE 3 (MODERATE): ICD-10-CM

## 2019-07-24 DIAGNOSIS — E11.59 OBESITY, DIABETES, AND HYPERTENSION SYNDROME: ICD-10-CM

## 2019-07-24 DIAGNOSIS — I15.2 OBESITY, DIABETES, AND HYPERTENSION SYNDROME: ICD-10-CM

## 2019-07-24 DIAGNOSIS — I73.9 PVD (PERIPHERAL VASCULAR DISEASE): ICD-10-CM

## 2019-07-24 DIAGNOSIS — N18.30 TYPE 2 DIABETES MELLITUS WITH STAGE 3 CHRONIC KIDNEY DISEASE, WITHOUT LONG-TERM CURRENT USE OF INSULIN: ICD-10-CM

## 2019-07-24 DIAGNOSIS — Z95.2 H/O MECHANICAL AORTIC VALVE REPLACEMENT: Primary | ICD-10-CM

## 2019-07-24 DIAGNOSIS — N18.30 CKD (CHRONIC KIDNEY DISEASE) STAGE 3, GFR 30-59 ML/MIN: ICD-10-CM

## 2019-07-24 DIAGNOSIS — E11.51 TYPE 2 DIABETES MELLITUS WITH DIABETIC PERIPHERAL ANGIOPATHY WITHOUT GANGRENE, WITHOUT LONG-TERM CURRENT USE OF INSULIN: ICD-10-CM

## 2019-07-24 DIAGNOSIS — E78.2 MIXED HYPERLIPIDEMIA: ICD-10-CM

## 2019-07-24 DIAGNOSIS — I71.9 AORTIC ANEURYSM WITHOUT RUPTURE, UNSPECIFIED PORTION OF AORTA: ICD-10-CM

## 2019-07-24 DIAGNOSIS — Z95.2 H/O MECHANICAL AORTIC VALVE REPLACEMENT: ICD-10-CM

## 2019-07-24 DIAGNOSIS — Z00.00 ENCOUNTER FOR PREVENTIVE HEALTH EXAMINATION: Primary | ICD-10-CM

## 2019-07-24 DIAGNOSIS — I72.3 ANEURYSM ARTERY, ILIAC: ICD-10-CM

## 2019-07-24 DIAGNOSIS — E66.9 OBESITY (BMI 30.0-34.9): ICD-10-CM

## 2019-07-24 DIAGNOSIS — I15.0 RENOVASCULAR HYPERTENSION: ICD-10-CM

## 2019-07-24 PROBLEM — E66.811 OBESITY (BMI 30.0-34.9): Status: ACTIVE | Noted: 2019-07-24

## 2019-07-24 PROCEDURE — 3075F PR MOST RECENT SYSTOLIC BLOOD PRESS GE 130-139MM HG: ICD-10-PCS | Mod: HCNC,CPTII,S$GLB, | Performed by: NURSE PRACTITIONER

## 2019-07-24 PROCEDURE — 3075F SYST BP GE 130 - 139MM HG: CPT | Mod: HCNC,CPTII,S$GLB, | Performed by: NURSE PRACTITIONER

## 2019-07-24 PROCEDURE — 99214 OFFICE O/P EST MOD 30 MIN: CPT | Mod: HCNC,S$GLB,, | Performed by: INTERNAL MEDICINE

## 2019-07-24 PROCEDURE — 1101F PR PT FALLS ASSESS DOC 0-1 FALLS W/OUT INJ PAST YR: ICD-10-PCS | Mod: HCNC,CPTII,S$GLB, | Performed by: INTERNAL MEDICINE

## 2019-07-24 PROCEDURE — 99499 UNLISTED E&M SERVICE: CPT | Mod: HCNC,S$GLB,, | Performed by: NURSE PRACTITIONER

## 2019-07-24 PROCEDURE — G0439 PR MEDICARE ANNUAL WELLNESS SUBSEQUENT VISIT: ICD-10-PCS | Mod: HCNC,S$GLB,, | Performed by: NURSE PRACTITIONER

## 2019-07-24 PROCEDURE — 3078F PR MOST RECENT DIASTOLIC BLOOD PRESSURE < 80 MM HG: ICD-10-PCS | Mod: HCNC,CPTII,S$GLB, | Performed by: NURSE PRACTITIONER

## 2019-07-24 PROCEDURE — 3078F DIAST BP <80 MM HG: CPT | Mod: HCNC,CPTII,S$GLB, | Performed by: INTERNAL MEDICINE

## 2019-07-24 PROCEDURE — 99214 PR OFFICE/OUTPT VISIT, EST, LEVL IV, 30-39 MIN: ICD-10-PCS | Mod: HCNC,S$GLB,, | Performed by: INTERNAL MEDICINE

## 2019-07-24 PROCEDURE — 99499 RISK ADDL DX/OHS AUDIT: ICD-10-PCS | Mod: HCNC,S$GLB,, | Performed by: INTERNAL MEDICINE

## 2019-07-24 PROCEDURE — 99999 PR PBB SHADOW E&M-EST. PATIENT-LVL V: CPT | Mod: PBBFAC,HCNC,, | Performed by: INTERNAL MEDICINE

## 2019-07-24 PROCEDURE — 99999 PR PBB SHADOW E&M-EST. PATIENT-LVL V: ICD-10-PCS | Mod: PBBFAC,HCNC,, | Performed by: INTERNAL MEDICINE

## 2019-07-24 PROCEDURE — 99999 PR PBB SHADOW E&M-EST. PATIENT-LVL IV: CPT | Mod: PBBFAC,HCNC,, | Performed by: NURSE PRACTITIONER

## 2019-07-24 PROCEDURE — G0439 PPPS, SUBSEQ VISIT: HCPCS | Mod: HCNC,S$GLB,, | Performed by: NURSE PRACTITIONER

## 2019-07-24 PROCEDURE — 1101F PT FALLS ASSESS-DOCD LE1/YR: CPT | Mod: HCNC,CPTII,S$GLB, | Performed by: INTERNAL MEDICINE

## 2019-07-24 PROCEDURE — 3075F SYST BP GE 130 - 139MM HG: CPT | Mod: HCNC,CPTII,S$GLB, | Performed by: INTERNAL MEDICINE

## 2019-07-24 PROCEDURE — 3075F PR MOST RECENT SYSTOLIC BLOOD PRESS GE 130-139MM HG: ICD-10-PCS | Mod: HCNC,CPTII,S$GLB, | Performed by: INTERNAL MEDICINE

## 2019-07-24 PROCEDURE — 3078F DIAST BP <80 MM HG: CPT | Mod: HCNC,CPTII,S$GLB, | Performed by: NURSE PRACTITIONER

## 2019-07-24 PROCEDURE — 3078F PR MOST RECENT DIASTOLIC BLOOD PRESSURE < 80 MM HG: ICD-10-PCS | Mod: HCNC,CPTII,S$GLB, | Performed by: INTERNAL MEDICINE

## 2019-07-24 PROCEDURE — 99999 PR PBB SHADOW E&M-EST. PATIENT-LVL IV: ICD-10-PCS | Mod: PBBFAC,HCNC,, | Performed by: NURSE PRACTITIONER

## 2019-07-24 PROCEDURE — 99499 UNLISTED E&M SERVICE: CPT | Mod: HCNC,S$GLB,, | Performed by: INTERNAL MEDICINE

## 2019-07-24 PROCEDURE — 99499 RISK ADDL DX/OHS AUDIT: ICD-10-PCS | Mod: HCNC,S$GLB,, | Performed by: NURSE PRACTITIONER

## 2019-07-24 NOTE — PROGRESS NOTES
"Dariel Urbina presented for a  Medicare AWV and comprehensive Health Risk Assessment today. The following components were reviewed and updated:    · Medical history  · Family History  · Social history  · Allergies and Current Medications  · Health Risk Assessment  · Health Maintenance  · Care Team     ** See Completed Assessments for Annual Wellness Visit within the encounter summary.**       The following assessments were completed:  · Living Situation  · CAGE  · Depression Screening  · Timed Get Up and Go  · Whisper Test  · Cognitive Function Screening  · Nutrition Screening  · ADL Screening  · PAQ Screening        Vitals:    07/24/19 0906   BP: 130/62   Pulse: 63   Weight: 88.8 kg (195 lb 12.3 oz)   Height: 5' 6" (1.676 m)     Body mass index is 31.6 kg/m².     Physical Exam   Constitutional: He is oriented to person, place, and time. He appears well-developed and well-nourished. No distress.   Obese stature   HENT:   Head: Normocephalic and atraumatic.   Eyes: No scleral icterus.   Neck: Normal range of motion. Neck supple.   Cardiovascular: Normal rate, regular rhythm, normal heart sounds and intact distal pulses.   Mechanical click noted   Pulmonary/Chest: Effort normal and breath sounds normal. No respiratory distress.   Abdominal: He exhibits no distension.   Musculoskeletal: Normal range of motion. He exhibits no edema.   Neurological: He is alert and oriented to person, place, and time.   Skin: Skin is warm and dry. He is not diaphoretic.   Psychiatric: He has a normal mood and affect. His behavior is normal.       Diagnoses and health risks identified today and associated recommendations/orders:    1. Encounter for preventive health examination  Assessments completed  Preventive health recommendations reviewed    2. Type 2 diabetes mellitus with diabetic peripheral angiopathy without gangrene, without long-term current use of insulin  Stable. Last HgbA1C was 5.5  Controlled with current medical " therapy  Followed by PCP.     3. Pulmonary heart disease  Stable. Last echo from 04/26/19 showed pa pressure of 49  Controlled with current medical therapy  Followed by cardiology.     4. Type 2 diabetes mellitus with stage 3 chronic kidney disease, without long-term current use of insulin  Stable. Last HgbA1C was 5.5  Controlled with current medical therapy  Followed by PCP and nephrology     5. Aneurysm artery, iliac  Stable. Seen on imaging from 05/23/16  Controlled with current medical therapy  Followed by PCP and cardiology.     6. Aortic aneurysm without rupture, unspecified portion of aorta  Stable. Seen last on us from 05/10/18  Controlled with current medical therapy  Followed by PCP and cardiology.     7. Atherosclerosis of coronary artery bypass graft of native heart without angina pectoris  Stable.   Controlled with current medical therapy  Followed by cardiology     8. Bilateral carotid artery disease, unspecified type  Stable. Seen on us from 03/11/15  Controlled with current medical therapy  Followed by PCP.     9. PVD (peripheral vascular disease)  Stable.   Controlled with current medical therapy  Followed by PCP.     10. H/O mechanical aortic valve replacement  Stable.   Controlled with current medical therapy  Followed by cardiology     11. CKD (chronic kidney disease) stage 3, GFR 30-59 ml/min  Stable.   Controlled with current medical therapy  Followed by nephrology     12. Anemia of chronic renal failure, stage 3 (moderate)  Stable.   Controlled with current medical therapy  Followed by PCP and nephrology     13. Hyperparathyroidism due to renal insufficiency  Stable.   Controlled with current medical therapy  Followed by PCP and nephrology.     14. Obesity, diabetes, and hypertension syndrome  Stable.   Controlled with current medical therapy  Followed by PCP.     15. Renovascular hypertension  Stable.   Controlled with current medical therapy  Followed by PCP and nephrology.     16. Mixed  hyperlipidemia  Stable.   Controlled with current medical therapy  Followed by PCP.     17. Long term current use of anticoagulant therapy  Stable.   Followed by the coumadin clinic     18. Obesity (BMI 30.0-34.9)  Stable.   Followed by PCP.     Provided Dariel with a 5-10 year written screening schedule and personal prevention plan. Recommendations were developed using the USPSTF age appropriate recommendations. Education, counseling, and referrals were provided as needed. After Visit Summary printed and given to patient which includes a list of additional screenings\tests needed.    Follow up in about 6 months (around 1/24/2020) for a routine visit with your primary care provider or sooner if problems arise. .    Joyce Ruano, NP

## 2019-07-24 NOTE — PROGRESS NOTES
I offered to discuss end of life issues, including information on how to make advance directives that the patient could use to name someone who would make medical decisions on their behalf if they became too ill to make themselves.    _x__Patient declined  __Patient is interested, I provided paper work and offered to discuss.

## 2019-07-24 NOTE — PATIENT INSTRUCTIONS
Counseling and Referral of Other Preventative  (Italic type indicates deductible and co-insurance are waived)    Patient Name: Dariel Urbina  Today's Date: 7/24/2019    Health Maintenance       Date Due Completion Date    Shingles Vaccine (1 of 2) 08/07/1991 ---    Eye Exam 06/05/2018 6/5/2017    Influenza Vaccine 08/01/2019 12/13/2017 (Done)    Override on 12/13/2017: Done (outside)    Override on 2/9/2017: Declined    Hemoglobin A1c 01/17/2020 7/17/2019    Lipid Panel 07/17/2020 7/17/2019    Foot Exam 07/24/2020 7/24/2019 (Done)    Override on 7/24/2019: Done    Override on 2/20/2018: Done    Override on 2/9/2017: Done (Done by pcp)    TETANUS VACCINE 05/25/2026 5/25/2016        No orders of the defined types were placed in this encounter.    The following information is provided to all patients.  This information is to help you find resources for any of the problems found today that may be affecting your health:                Living healthy guide: www.[facility].[place].gov      Understanding Diabetes: www.diabetes.org      Eating healthy: www.cdc.gov/healthyweight      CDC home safety checklist: www.cdc.gov/steadi/patient.html      Agency on Aging: www.goea.[place].gov      Alcoholics anonymous (AA): www.aa.org      Physical Activity: www.ra.nih.gov/jz8lwrb      Tobacco use: www.quitwithusla.org

## 2019-07-24 NOTE — PROGRESS NOTES
HPI    77 yo M here for follow up his medical problems.  I last saw him a year ago.           He has  diabetes, hypertension, CAD, HLD, gout and mechanical aortic valve who Had a GI bleed in APril last year.    . During his admission the patient developed an JIM, and with fluid replacement he developed hyperkalemia, which when being corrected the patient developed an NSTEMI. [facility] determined no acute intervention was needed ( saw Dr Vences for follow up 4/26/18)  . . Pt denies abdo pain, chest pain, SOB, dizziness, lightheadedness, fatigue, leg swelling. C-scope results from 4/2018:      Diverticulosis in the sigmoid colon. There was                         active bleeding coming from the diverticular                         opening. Clip (MR conditional) was placed.                        - One 4 mm polyp in the descending colon. Resection                         not attempted.                        - One 10 mm polyp at the splenic flexure. Resection                         not attempted. Tattooed.  .  He has a follow up with GI.        Diabetes mellitus type 2. Last A1c was 5.5  -- . He was put on amaryl 1mg last visit.  .  . Weight today is 195 # lbs-  Weight  Up a 5 #  Since last visit. . Patient denies polyuria, polydipsia, visual disturbances.         Hyperlipidemia. On lipitor 40 mg , lovaza. And Fenofibrate --Recent lipid panel showed CHol 194, Hdl 34, LDL 98, and . He says he is taking all his cholesterol meds.         HTN. On monopril, lasix, imdur, lopressor. Pt does not measure BPs at home. BP today is 130/62. . HIs  ACE was stopped due to JIM and hyperkalemia. K=5.2.  He see nephrology in August- recent creatine was 1.5-- K was 4.5.          CKD stage III. Pt saw Nephrology in 9/2017-- than note was reviewed. Cr back down to baseline of 1.5.  His potasium is   4.5. He has been instructed on a a low potasium diet by nephrology, but is not following one.  .         H/o partial colon resection due to  diverticulosis. See above.  NO GI  bleeding since last visit.  NO recent diarrhea.         .    H/o CAD s/p CABG 2002, multiple stents, aortic valve replacement on coumadin. He saws he been having  chest pain for about 4 years ( not really per him)-- he has limited activity .   . He saw Dr. Vences in Dec and will see him  Again in Sept 2019.  The note form Dec was reviewed--  . He has LÓPEZ but this is chronic and some left sided claudication.   The chest pain he says in in his throat and her takes NTG and it does not help.  It is not with exertion but is with lying down.  He says the pain he is having is a burning in the throat. It has not changed in 3 years.        AAA- last us Showed 3.32 cm AAA- (3/2018 )largest supra renal area. since last visit -            H/o gout. On allopurinol. Last flare was 18 mo ago in left foot. He remains on Allopurinol.      Valvular heart disease- with mechanical AV, some MVR and some TVR-- NO NO recent chest pains .  Reviewed recent PET stress test.   SOB with walking 50 feet- unchanged since last visit, and the one previous to that.           He has a benign angioma of his left check that he saw derm for in back in 2017.  He was given reassurance.  But he and his wife jet it is bleeding all the time with any touching or sometimes spontaneously. .  It has not grown.        Review of Systems    Constitutional: Negative for fever, chills and unexpected weight change.    HENT: Negative for congestion, rhinorrhea and sinus pressure.    Eyes: Negative for visual disturbance.    Respiratory: He Has some SOB, but this is unchanged. He can mow the lawn with walk behind mower.    This has not changed since last visit.    Cardiovascular:as above.  .    Gastrointestinal: Negative for nausea, vomiting, abdominal pain, and constipation.  He does have some diarrhea-  - as above.    Genitourinary: Negative for dysuria, urgency and difficulty urinating.    Musculoskeletal: He reports his back-  "that was bothering him last vsiit- is not bothering him recently.    Skin: Negative.    Neurological: Negative for dizziness, syncope and headaches.    Hematological: Negative.    Psychiatric/Behavioral: Negative.    All other systems reviewed and are negative.    Objective:      /62   Pulse 63   Ht 5' 6" (1.676 m)   Wt 88.8 kg (195 lb 12.3 oz)   SpO2 97%   BMI 31.60 kg/m²              Constitutional: He is oriented to person, place, and time. He appears well-developed and well-nourished. No distress.    HENT:    Head: Normocephalic and atraumatic. He has a benign lesion on his left check.    Mouth/Throat: Oropharynx is clear and moist. No oropharyngeal exudate.    Eyes: Conjunctivae and EOM are normal.    Neck: Normal range of motion. Neck supple. No JVD present. No tracheal deviation present.    Cardiovascular: Regular rhythm and normal heart sounds. Exam reveals no gallop and no friction rub.    Systolic murmur heard.    Pulmonary/Chest: Effort normal and breath sounds normal. No respiratory distress. He has no wheezes. He has no rales.    Abdominal: Soft. Bowel sounds are normal. He exhibits no distension. There is no tenderness. There is no rebound.   Musculoskeletal: Normal range of motion. He exhibits no edema and no tenderness.   Neurological: He is alert and oriented to person, place, and time.    Skin: Skin is warm and dry. No rash noted. He is not diaphoretic. No erythema.   Psychiatric: He has a normal mood and affect. His behavior is normal.    Protective Sensation (w/ 10 gram monofilament):  Right: Decreased  Left: Decreased    Visual Inspection:  Normal -  Bilateral    Pedal Pulses:   Right: Present  Left: Present    Posterior tibialis:   Right:Present  Left: Present                 Assessment:      1.   DM (diabetes mellitus)     2.   Hyperlipidemia     3.   Chronic kidney disease, stage III (moderate)     4.   History of colon resection     5.   Hypertension     6.   CAD (coronary " atherosclerotic disease)     7.    8.  9.  10.  History of aortic valve repair    AAA  AVR-mec- on coumadin   Colon polyps-- will refer back to GI.  Cards - ok'd getting c-scope      Plan:      1. GI bleed- resolved--   2. HLD. continue  On lipitor, lovaza.  lipitor to 80 mg a day.   3. DM type 2 for stable-- Hgb A1c controled.  . - follow up in 6 months   4. CKD, stage III. Seeing nephrology. Counseled on importance of BP control. Cont BP meds. Discussed low potassium diet.    5. H/o partial colon resection. No complaints with N/V/D, constipation. GI follow up in Sept.  .    6. H/o CAD s/p CABG 2002, multiple stents, aortic valve replacement on coumadin. Cont coumadin and BP meds.  7. H/o gout.  Cont allopurinol.    8. hyperkalemia --this is better-- recheck in 4 months will need to follow  a low k diet  9.   HTN. Stable   10 skin lesion -- benign- is gong to try a styptic pencil and if not better will go back and see Derm        Will set up for eye exam      WIll follow up in 6 months

## 2019-08-02 ENCOUNTER — OFFICE VISIT (OUTPATIENT)
Dept: OPTOMETRY | Facility: CLINIC | Age: 79
End: 2019-08-02
Payer: COMMERCIAL

## 2019-08-02 ENCOUNTER — ANTI-COAG VISIT (OUTPATIENT)
Dept: CARDIOLOGY | Facility: CLINIC | Age: 79
End: 2019-08-02
Payer: MEDICARE

## 2019-08-02 DIAGNOSIS — H26.9 CORTICAL CATARACT OF BOTH EYES: ICD-10-CM

## 2019-08-02 DIAGNOSIS — H35.372 EPIRETINAL MEMBRANE (ERM) OF LEFT EYE: ICD-10-CM

## 2019-08-02 DIAGNOSIS — Z79.01 LONG TERM CURRENT USE OF ANTICOAGULANT THERAPY: Primary | ICD-10-CM

## 2019-08-02 DIAGNOSIS — E11.9 TYPE 2 DIABETES MELLITUS WITHOUT OPHTHALMIC MANIFESTATIONS: ICD-10-CM

## 2019-08-02 DIAGNOSIS — H25.13 NS (NUCLEAR SCLEROSIS), BILATERAL: ICD-10-CM

## 2019-08-02 DIAGNOSIS — Z95.2 H/O MECHANICAL AORTIC VALVE REPLACEMENT: ICD-10-CM

## 2019-08-02 DIAGNOSIS — H52.4 PRESBYOPIA: Primary | ICD-10-CM

## 2019-08-02 LAB — INR PPP: 2.7 (ref 2–3)

## 2019-08-02 PROCEDURE — 92015 DETERMINE REFRACTIVE STATE: CPT | Mod: S$GLB,,, | Performed by: OPTOMETRIST

## 2019-08-02 PROCEDURE — 99999 PR PBB SHADOW E&M-EST. PATIENT-LVL III: CPT | Mod: PBBFAC,,, | Performed by: OPTOMETRIST

## 2019-08-02 PROCEDURE — 92014 PR EYE EXAM, EST PATIENT,COMPREHESV: ICD-10-PCS | Mod: S$GLB,,, | Performed by: OPTOMETRIST

## 2019-08-02 PROCEDURE — 93793 ANTICOAG MGMT PT WARFARIN: CPT | Mod: HCNC,S$GLB,,

## 2019-08-02 PROCEDURE — 99999 PR PBB SHADOW E&M-EST. PATIENT-LVL III: ICD-10-PCS | Mod: PBBFAC,,, | Performed by: OPTOMETRIST

## 2019-08-02 PROCEDURE — 92014 COMPRE OPH EXAM EST PT 1/>: CPT | Mod: S$GLB,,, | Performed by: OPTOMETRIST

## 2019-08-02 PROCEDURE — 92015 PR REFRACTION: ICD-10-PCS | Mod: S$GLB,,, | Performed by: OPTOMETRIST

## 2019-08-02 PROCEDURE — 85610 POCT INR: ICD-10-PCS | Mod: QW,HCNC,S$GLB, | Performed by: INTERNAL MEDICINE

## 2019-08-02 PROCEDURE — 93793 PR ANTICOAGULANT MGMT FOR PT TAKING WARFARIN: ICD-10-PCS | Mod: HCNC,S$GLB,,

## 2019-08-02 PROCEDURE — 92134 CPTRZ OPH DX IMG PST SGM RTA: CPT | Mod: S$GLB,,, | Performed by: OPTOMETRIST

## 2019-08-02 PROCEDURE — 85610 PROTHROMBIN TIME: CPT | Mod: QW,HCNC,S$GLB, | Performed by: INTERNAL MEDICINE

## 2019-08-02 PROCEDURE — 92134 POSTERIOR SEGMENT OCT RETINA (OCULAR COHERENCE TOMOGRAPHY)-BOTH EYES: ICD-10-PCS | Mod: S$GLB,,, | Performed by: OPTOMETRIST

## 2019-08-02 NOTE — PROGRESS NOTES
HPI     Last eye exam was 3/27/17 with Dr Vasquez.    Pt here for annual diabetic eye exam.  Pt has glasses but not happy with them because he feels like the bifocal   was cut too high so he does not wear them hardly  Pt did not bring glasses today  Pt feels like distance is not as good.   Pt says OU get watery every day  Patient denies diplopia, headaches, flashes/floaters, pain, and   itching/burning.   Pt does not use any eye drops.    Hemoglobin A1C       Date                     Value               Ref Range             Status                07/17/2019               5.5                 4.0 - 5.6 %           Final                     04/01/2018               5.4                 4.0 - 5.6 %           Final                     03/19/2018               5.5                 4.0 - 5.6 %           Final                  Last edited by Jenn Peguero on 8/2/2019  9:33 AM. (History)            Assessment /Plan     For exam results, see Encounter Report.    Presbyopia  Rx specs    Epiretinal membrane (ERM) of left eye  -     Posterior Segment OCT Retina-Both eyes OD WNL OS ERM   Monitor yearly    NS (nuclear sclerosis), bilateral  Cortical cataract of both eyes   Mild, borderline, not visually significant     Type 2 diabetes mellitus without ophthalmic manifestations   Good overall ocular health, monitor yearly   A1c control

## 2019-08-02 NOTE — PROGRESS NOTES
INR at goal. Medications and chart reviewed. No changes noted to necessitate adjustment of warfarin or follow-up plan. See calendar.  Pt denies any changes. Will maintain current regimen & re-assess in 6 weeks.    Patient was re-educated on situations that would require placing a call to the Coumadin Clinic, including bleeding or unusual bruising issues, changes in health, diet or medications,upcoming procedures that require warfarin interruption, and missed Coumadin dose(s). Patient expressed understanding that avoidance of consistency with these parameters could cause fluctuations in INR, leading to more frequent visits and increase risk of adverse events.

## 2019-08-02 NOTE — LETTER
August 2, 2019      Devon Langston Jr., MD  [address]  [facility] [address]           [facility]  [address]  [facility] [address]  Phone: [phone]  Fax: [phone]          Patient: Dariel Urbina   MR Number: [id number]   YOB: 1940   Date of Visit: 8/2/2019       Dear Dr. Devon Langston Jr.:    Thank you for referring Dariel Urbina to me for evaluation. Attached you will find relevant portions of my assessment and plan of care.    If you have questions, please do not hesitate to call me. I look forward to following Dariel Urbina along with you.    Sincerely,    Maricel Narvaez, OD    Enclosure  CC:  No Recipients    If you would like to receive this communication electronically, please contact [email or web address] or [phone] to request more information on Patient Communicator Link access.    For providers and/or their staff who would like to refer a patient to [facility], please contact us through our one-stop-shop provider referral line, [facility], at [phone].    If you feel you have received this communication in error or would no longer like to receive these types of communications, please e-mail [email or web address]

## 2019-08-16 ENCOUNTER — OFFICE VISIT (OUTPATIENT)
Dept: NEPHROLOGY | Facility: CLINIC | Age: 79
End: 2019-08-16
Payer: MEDICARE

## 2019-08-16 VITALS
BODY MASS INDEX: 31.85 KG/M2 | HEIGHT: 66 IN | DIASTOLIC BLOOD PRESSURE: 60 MMHG | WEIGHT: 198.19 LBS | HEART RATE: 70 BPM | SYSTOLIC BLOOD PRESSURE: 140 MMHG | OXYGEN SATURATION: 96 %

## 2019-08-16 DIAGNOSIS — N18.30 ANEMIA OF CHRONIC RENAL FAILURE, STAGE 3 (MODERATE): ICD-10-CM

## 2019-08-16 DIAGNOSIS — I12.9 HYPERTENSIVE KIDNEY DISEASE WITH CHRONIC KIDNEY DISEASE, STAGE 1-4 OR UNSPECIFIED CHRONIC KIDNEY DISEASE: ICD-10-CM

## 2019-08-16 DIAGNOSIS — N18.30 CONTROLLED TYPE 2 DIABETES MELLITUS WITH STAGE 3 CHRONIC KIDNEY DISEASE, WITHOUT LONG-TERM CURRENT USE OF INSULIN: Primary | ICD-10-CM

## 2019-08-16 DIAGNOSIS — E11.22 CONTROLLED TYPE 2 DIABETES MELLITUS WITH STAGE 3 CHRONIC KIDNEY DISEASE, WITHOUT LONG-TERM CURRENT USE OF INSULIN: Primary | ICD-10-CM

## 2019-08-16 DIAGNOSIS — D63.1 ANEMIA OF CHRONIC RENAL FAILURE, STAGE 3 (MODERATE): ICD-10-CM

## 2019-08-16 DIAGNOSIS — N25.81 HYPERPARATHYROIDISM DUE TO RENAL INSUFFICIENCY: ICD-10-CM

## 2019-08-16 DIAGNOSIS — N18.30 CHRONIC KIDNEY DISEASE, STAGE III (MODERATE): ICD-10-CM

## 2019-08-16 PROCEDURE — 3077F PR MOST RECENT SYSTOLIC BLOOD PRESSURE >= 140 MM HG: ICD-10-PCS | Mod: HCNC,CPTII,S$GLB, | Performed by: INTERNAL MEDICINE

## 2019-08-16 PROCEDURE — 1101F PR PT FALLS ASSESS DOC 0-1 FALLS W/OUT INJ PAST YR: ICD-10-PCS | Mod: HCNC,CPTII,S$GLB, | Performed by: INTERNAL MEDICINE

## 2019-08-16 PROCEDURE — 99499 RISK ADDL DX/OHS AUDIT: ICD-10-PCS | Mod: HCNC,S$GLB,, | Performed by: INTERNAL MEDICINE

## 2019-08-16 PROCEDURE — 99499 UNLISTED E&M SERVICE: CPT | Mod: HCNC,S$GLB,, | Performed by: INTERNAL MEDICINE

## 2019-08-16 PROCEDURE — 99999 PR PBB SHADOW E&M-EST. PATIENT-LVL III: CPT | Mod: PBBFAC,HCNC,, | Performed by: INTERNAL MEDICINE

## 2019-08-16 PROCEDURE — 3077F SYST BP >= 140 MM HG: CPT | Mod: HCNC,CPTII,S$GLB, | Performed by: INTERNAL MEDICINE

## 2019-08-16 PROCEDURE — 3078F DIAST BP <80 MM HG: CPT | Mod: HCNC,CPTII,S$GLB, | Performed by: INTERNAL MEDICINE

## 2019-08-16 PROCEDURE — 99213 OFFICE O/P EST LOW 20 MIN: CPT | Mod: HCNC,S$GLB,, | Performed by: INTERNAL MEDICINE

## 2019-08-16 PROCEDURE — 99213 PR OFFICE/OUTPT VISIT, EST, LEVL III, 20-29 MIN: ICD-10-PCS | Mod: HCNC,S$GLB,, | Performed by: INTERNAL MEDICINE

## 2019-08-16 PROCEDURE — 1101F PT FALLS ASSESS-DOCD LE1/YR: CPT | Mod: HCNC,CPTII,S$GLB, | Performed by: INTERNAL MEDICINE

## 2019-08-16 PROCEDURE — 99999 PR PBB SHADOW E&M-EST. PATIENT-LVL III: ICD-10-PCS | Mod: PBBFAC,HCNC,, | Performed by: INTERNAL MEDICINE

## 2019-08-16 PROCEDURE — 3078F PR MOST RECENT DIASTOLIC BLOOD PRESSURE < 80 MM HG: ICD-10-PCS | Mod: HCNC,CPTII,S$GLB, | Performed by: INTERNAL MEDICINE

## 2019-08-16 RX ORDER — GLIMEPIRIDE 1 MG/1
1 TABLET ORAL EVERY MORNING
Qty: 90 TABLET | Refills: 3 | Status: SHIPPED | OUTPATIENT
Start: 2019-08-16 | End: 2020-06-24 | Stop reason: SDUPTHER

## 2019-08-16 NOTE — TELEPHONE ENCOUNTER
----- Message from Rasheeda Frye sent at 8/16/2019  1:01 PM CDT -----  Contact: Ameena/ Pt's Wife/ [phone]  Type: Rx    Name of medication(s): glimepiride (AMARYL) 1 MG tablet    Is this a refill? New rx?New    Who prescribed medication?Hemalatha Longo MD    [email or web address] [address]    Comments:Please fill.      Thanks

## 2019-09-12 ENCOUNTER — ANTI-COAG VISIT (OUTPATIENT)
Dept: CARDIOLOGY | Facility: CLINIC | Age: 79
End: 2019-09-12
Payer: MEDICARE

## 2019-09-12 DIAGNOSIS — Z79.01 LONG TERM CURRENT USE OF ANTICOAGULANT THERAPY: Primary | ICD-10-CM

## 2019-09-12 DIAGNOSIS — Z95.2 H/O MECHANICAL AORTIC VALVE REPLACEMENT: ICD-10-CM

## 2019-09-12 LAB — INR PPP: 2.7 (ref 2–3)

## 2019-09-12 PROCEDURE — 93793 PR ANTICOAGULANT MGMT FOR PT TAKING WARFARIN: ICD-10-PCS | Mod: HCNC,S$GLB,,

## 2019-09-12 PROCEDURE — 93793 ANTICOAG MGMT PT WARFARIN: CPT | Mod: HCNC,S$GLB,,

## 2019-09-12 PROCEDURE — 85610 PROTHROMBIN TIME: CPT | Mod: QW,HCNC,S$GLB, | Performed by: INTERNAL MEDICINE

## 2019-09-12 PROCEDURE — 85610 POCT INR: ICD-10-PCS | Mod: QW,HCNC,S$GLB, | Performed by: INTERNAL MEDICINE

## 2019-09-12 NOTE — PROGRESS NOTES
INR at goal. Medications and chart reviewed. No changes noted to necessitate adjustment of warfarin or follow-up plan. See calendar.  Findings: Pt states that he has some swelling in his ankles.  He has an appt w/ cardiology tomorrow.  He denies any other changes.  Maintain current regimen & re-assess in 6 weeks.   Patient was re-educated on situations that would require placing a call to the Coumadin Clinic, including bleeding or unusual bruising issues, changes in health, diet or medications,upcoming procedures that require warfarin interruption, and missed Coumadin dose(s). Patient expressed understanding that avoidance of consistency with these parameters could cause fluctuations in INR, leading to more frequent visits and increase risk of adverse events.

## 2019-09-24 ENCOUNTER — PATIENT OUTREACH (OUTPATIENT)
Dept: ADMINISTRATIVE | Facility: OTHER | Age: 79
End: 2019-09-24

## 2019-09-25 ENCOUNTER — OFFICE VISIT (OUTPATIENT)
Dept: CARDIOLOGY | Facility: CLINIC | Age: 79
End: 2019-09-25
Payer: MEDICARE

## 2019-09-25 VITALS
WEIGHT: 197.56 LBS | SYSTOLIC BLOOD PRESSURE: 149 MMHG | HEART RATE: 64 BPM | DIASTOLIC BLOOD PRESSURE: 64 MMHG | BODY MASS INDEX: 31.75 KG/M2 | HEIGHT: 66 IN | OXYGEN SATURATION: 97 %

## 2019-09-25 DIAGNOSIS — I65.23 BILATERAL CAROTID ARTERY STENOSIS: Primary | ICD-10-CM

## 2019-09-25 DIAGNOSIS — E78.2 MIXED HYPERLIPIDEMIA: ICD-10-CM

## 2019-09-25 DIAGNOSIS — N18.30 CKD (CHRONIC KIDNEY DISEASE) STAGE 3, GFR 30-59 ML/MIN: ICD-10-CM

## 2019-09-25 DIAGNOSIS — Z95.2 H/O MECHANICAL AORTIC VALVE REPLACEMENT: ICD-10-CM

## 2019-09-25 DIAGNOSIS — I10 HYPERTENSION, UNSPECIFIED TYPE: ICD-10-CM

## 2019-09-25 DIAGNOSIS — I25.708 CORONARY ARTERY DISEASE OF BYPASS GRAFT OF NATIVE HEART WITH STABLE ANGINA PECTORIS: ICD-10-CM

## 2019-09-25 DIAGNOSIS — R29.898 BILATERAL ARM WEAKNESS: ICD-10-CM

## 2019-09-25 DIAGNOSIS — R60.0 EDEMA OF BOTH LOWER EXTREMITIES: ICD-10-CM

## 2019-09-25 DIAGNOSIS — R60.0 BILATERAL LOWER EXTREMITY EDEMA: ICD-10-CM

## 2019-09-25 DIAGNOSIS — E66.9 OBESITY (BMI 30.0-34.9): ICD-10-CM

## 2019-09-25 DIAGNOSIS — E11.22 TYPE 2 DIABETES MELLITUS WITH STAGE 3 CHRONIC KIDNEY DISEASE, WITHOUT LONG-TERM CURRENT USE OF INSULIN: ICD-10-CM

## 2019-09-25 DIAGNOSIS — N18.30 TYPE 2 DIABETES MELLITUS WITH STAGE 3 CHRONIC KIDNEY DISEASE, WITHOUT LONG-TERM CURRENT USE OF INSULIN: ICD-10-CM

## 2019-09-25 DIAGNOSIS — I65.23 BILATERAL CAROTID ARTERY STENOSIS: ICD-10-CM

## 2019-09-25 DIAGNOSIS — Z79.01 LONG TERM CURRENT USE OF ANTICOAGULANT THERAPY: ICD-10-CM

## 2019-09-25 DIAGNOSIS — R29.898 UPPER EXTREMITY WEAKNESS: Primary | ICD-10-CM

## 2019-09-25 DIAGNOSIS — I73.9 PVD (PERIPHERAL VASCULAR DISEASE): ICD-10-CM

## 2019-09-25 DIAGNOSIS — I71.40 ABDOMINAL AORTIC ANEURYSM (AAA) WITHOUT RUPTURE: ICD-10-CM

## 2019-09-25 PROCEDURE — 99499 RISK ADDL DX/OHS AUDIT: ICD-10-PCS | Mod: HCNC,S$GLB,, | Performed by: PHYSICIAN ASSISTANT

## 2019-09-25 PROCEDURE — 99999 PR PBB SHADOW E&M-EST. PATIENT-LVL III: CPT | Mod: PBBFAC,HCNC,, | Performed by: INTERNAL MEDICINE

## 2019-09-25 PROCEDURE — 99499 UNLISTED E&M SERVICE: CPT | Mod: HCNC,S$GLB,, | Performed by: PHYSICIAN ASSISTANT

## 2019-09-25 PROCEDURE — 1101F PR PT FALLS ASSESS DOC 0-1 FALLS W/OUT INJ PAST YR: ICD-10-PCS | Mod: HCNC,CPTII,S$GLB, | Performed by: INTERNAL MEDICINE

## 2019-09-25 PROCEDURE — 99213 OFFICE O/P EST LOW 20 MIN: CPT | Mod: HCNC,S$GLB,, | Performed by: INTERNAL MEDICINE

## 2019-09-25 PROCEDURE — 3077F PR MOST RECENT SYSTOLIC BLOOD PRESSURE >= 140 MM HG: ICD-10-PCS | Mod: HCNC,CPTII,S$GLB, | Performed by: INTERNAL MEDICINE

## 2019-09-25 PROCEDURE — 1101F PT FALLS ASSESS-DOCD LE1/YR: CPT | Mod: HCNC,CPTII,S$GLB, | Performed by: INTERNAL MEDICINE

## 2019-09-25 PROCEDURE — 3077F SYST BP >= 140 MM HG: CPT | Mod: HCNC,CPTII,S$GLB, | Performed by: INTERNAL MEDICINE

## 2019-09-25 PROCEDURE — 3078F DIAST BP <80 MM HG: CPT | Mod: HCNC,CPTII,S$GLB, | Performed by: INTERNAL MEDICINE

## 2019-09-25 PROCEDURE — 99213 PR OFFICE/OUTPT VISIT, EST, LEVL III, 20-29 MIN: ICD-10-PCS | Mod: HCNC,S$GLB,, | Performed by: INTERNAL MEDICINE

## 2019-09-25 PROCEDURE — 99999 PR PBB SHADOW E&M-EST. PATIENT-LVL III: ICD-10-PCS | Mod: PBBFAC,HCNC,, | Performed by: INTERNAL MEDICINE

## 2019-09-25 PROCEDURE — 3078F PR MOST RECENT DIASTOLIC BLOOD PRESSURE < 80 MM HG: ICD-10-PCS | Mod: HCNC,CPTII,S$GLB, | Performed by: INTERNAL MEDICINE

## 2019-09-25 NOTE — PROGRESS NOTES
Interventional Cardiology Clinic Note  Reason for Visit: Aortic stenosis    HPI:   Dariel Urbina is a 79 y.o. male who presents for Aortic Stenosis  Mr. Urbina has a PMH significant for CAD s/p CABG, mechanical aortic valve, AAA, carotid stenosis, PVD, DM, CKD III, and HLD who presents for 6 month follow up. Patient states he still has constant b/l arm numbness which causes him to constantly drop things. His right arm is worse than his left arm. This has been occurring since his last visit 6 months ago and has been stable. He had a cervical x-ray which showed chronic degenerative changes. He also continues to have stable LÓPEZ and anginal chest pain. He becomes dyspneic after walking about 70-80 feet. His chest pain is exertional, substernal, non-radiating, and relieved with about 1 minute of rest. He has not taken any Nitro for it. He states the LÓPEZ and chest pain have been stable for years.    Over the past few months, he has developed lower extremity edema bilaterally, but mostly in his LLE. He saw his nephrologist one month ago who advised him to double his Lasix for two days which did not help. He tried doubling his Lasix again for 2 days on his own, but his swelling did not decrease. His LE edema is present upon waking up in the morning.     He otherwise reports compliance with his medications. He denies any dizziness, syncope, palpitations, orthopnea, PND, or bleeding problems. His last INR was 2.7. His last Cr was 1.5.    ROS:    Constitution: Negative for diaphoresis and malaise/fatigue.   HENT: Negative for nosebleeds.  Cardiovascular: Negative for claudication, near-syncope, orthopnea, palpitations, paroxysmal nocturnal dyspnea and syncope. Positive for chest pain, dyspnea on exertion, and leg swelling.   Respiratory: Negative for snoring and wheezing. Positive for shortness of breath.  Hematologic/Lymphatic: Negative for bleeding problem. Does not bruise/bleed easily.   Musculoskeletal: Negative  for muscle cramps and myalgias.   Gastrointestinal: Negative for bloating, abdominal pain, nausea and vomiting.   Neurological: Negative for dizziness, headaches and light-headedness.   PMH:     Past Medical History:   Diagnosis Date    Anemia of chronic renal failure, stage 3 (moderate) 5/27/2015    Atherosclerosis of coronary artery bypass graft of native heart without angina pectoris 9/11/2012    3-27-18 [facility] Two vessel coronary artery disease.   Prosthetic aortic valve.   Porcelain aorta.   Patent LIMA graft.    Bilateral carotid artery disease 2/9/2017    Bleeding from the nose     Bleeding nose 3/21/2018    Cataract     CKD (chronic kidney disease) stage 3, GFR 30-59 ml/min 5/27/2015    Claudication of left lower extremity 9/17/2014    Colon polyp     Gastroesophageal reflux disease without esophagitis 3/19/2018    Gastrointestinal hemorrhage associated with intestinal diverticulosis 4/1/2018    H/O mechanical aortic valve replacement 9/17/2014    History of gout 9/26/2012    Hyperparathyroidism due to renal insufficiency 7/27/2015    Internal hemorrhoid 4/3/2018    Long term current use of anticoagulant therapy 9/26/2012    Mechanical heart valve present     Metabolic acidosis with normal anion gap and bicarbonate losses 3/20/2018    Mixed hyperlipidemia 9/26/2012    NSTEMI (non-ST elevated myocardial infarction) 3/21/2018    Obesity, diabetes, and hypertension syndrome 2/23/2016    PVD (peripheral vascular disease) 9/11/2012    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/2012    Type 2 diabetes mellitus with diabetic peripheral angiopathy without gangrene 5/27/2015    Type 2 diabetes mellitus with stage 3 chronic kidney disease, without long-term current use of insulin 10/2/2013     Past Surgical History:   Procedure Laterality Date    CARDIAC CATHETERIZATION      CARDIAC VALVE REPLACEMENT      CARDIAC VALVE SURGERY      COLON SURGERY      COLONOSCOPY N/A 3/31/2017    Procedure: COLONOSCOPY;   Surgeon: Bruno Raymond MD;  Location: [facility] ENDO (4TH FLR);  Service: Endoscopy;  Laterality: N/A;  Patient's wife requesting date.    COLONOSCOPY N/A 4/3/2018    Procedure: COLONOSCOPY;  Surgeon: Bonifacio Pelletier MD;  Location: [facility] ENDO (2ND FLR);  Service: Endoscopy;  Laterality: N/A;    COLONOSCOPY N/A 8/13/2018    Procedure: COLONOSCOPY;  Surgeon: Kam Barba MD;  Location: [facility] ENDO (2ND FLR);  Service: Endoscopy;  Laterality: N/A;  2nd floor: PA pressure 49; hx of moderate-severe valve disease     per Coumadin clinic-Patient can hold 5 days with lovenox bridge       ok to schedule per [facility]    CORONARY ANGIOPLASTY      CORONARY ARTERY BYPASS GRAFT      HERNIA REPAIR      SPINE SURGERY      VASECTOMY       Allergies:     Review of patient's allergies indicates:   Allergen Reactions    Fosinopril      Intolerance- elevates potassium level      Losartan      Intolerance- elevates potassium level     Medications:     Current Outpatient Medications on File Prior to Visit   Medication Sig Dispense Refill    allopurinol (ZYLOPRIM) 100 MG tablet TAKE 1 TABLET EVERY DAY 90 tablet 3    aspirin (ECOTRIN) 81 MG EC tablet Take 1 tablet (81 mg total) by mouth once daily.  0    atorvastatin (LIPITOR) 80 MG tablet TAKE 1 TABLET ONE TIME DAILY 90 tablet 3    carvedilol (COREG) 12.5 MG tablet Take 1 tablet (12.5 mg total) by mouth 2 (two) times daily. 180 tablet 4    clotrimazole-betamethasone 1-0.05% (LOTRISONE) cream Apply topically once daily. in between the 4th and 5th toes left foot. 45 g 1    COLCRYS 0.6 mg tablet TAKE 1 TABLET (0.6 MG TOTAL) BY MOUTH 2 (TWO) TIMES DAILY AS NEEDED (GOUT). 60 tablet 0    fenofibrate micronized (LOFIBRA) 200 MG Cap Take 200 mg by mouth daily with breakfast.      ferrous sulfate 325 (65 FE) MG EC tablet Take 1 tablet (325 mg total) by mouth once daily. 90 tablet 4    furosemide (LASIX) 20 MG tablet One tablet one time daily 90 tablet 4    glimepiride (AMARYL) 1 MG  tablet Take 1 tablet (1 mg total) by mouth every morning. 90 tablet 3    isosorbide mononitrate (IMDUR) 120 MG 24 hr tablet TAKE 1 TABLET EVERY DAY 90 tablet 4    NIFEdipine (PROCARDIA-XL) 60 MG (OSM) 24 hr tablet Take 1 tablet (60 mg total) by mouth once daily. 90 tablet 4    omega-3 acid ethyl esters (LOVAZA) 1 gram capsule Take 2 capsules (2 g total) by mouth 2 (two) times daily. 360 capsule 5    ranitidine (ZANTAC) 300 MG tablet       warfarin (COUMADIN) 5 MG tablet TAKE 1 & 1/2 TO 2 TABLETS (7.5 TO 10 MG TOTAL) BY  MOUTH DAILY AS DIRECTED BY COUMADIN CLINIC 180 tablet 3    enoxaparin (LOVENOX) 120 mg/0.8 mL Syrg Inject 0.8 mLs (120 mg total) into the skin every 24 hours as needed (as directed by coumadin clinic). (Patient not taking: Reported on 2019) 10 Syringe 1    nitroGLYCERIN (NITROSTAT) 0.4 MG SL tablet Place 1 tablet (0.4 mg total) under the tongue every 5 (five) minutes as needed. As needed for chest pain (Patient not taking: Reported on 2019) 90 tablet 4     No current facility-administered medications on file prior to visit.      Social History:     Social History     Tobacco Use    Smoking status: Former Smoker     Packs/day: 1.00     Years: 20.00     Pack years: 20.00     Last attempt to quit: 1980     Years since quittin.0    Smokeless tobacco: Never Used   Substance Use Topics    Alcohol use: No     Family History:     Family History   Problem Relation Age of Onset    Heart failure Mother     Heart disease Mother     Heart failure Father     Heart disease Father     Alcohol abuse Father     Heart failure Brother     Heart disease Brother     Diabetes Brother     No Known Problems Sister     No Known Problems Maternal Grandmother     No Known Problems Maternal Grandfather     No Known Problems Paternal Grandmother     No Known Problems Paternal Grandfather     Heart disease Sister     No Known Problems Maternal Aunt     No Known Problems Maternal Uncle   "   No Known Problems Paternal Aunt     No Known Problems Paternal Uncle     Amblyopia Neg Hx     Blindness Neg Hx     Cancer Neg Hx     Cataracts Neg Hx     Glaucoma Neg Hx     Hypertension Neg Hx     Macular degeneration Neg Hx     Retinal detachment Neg Hx     Strabismus Neg Hx     Stroke Neg Hx     Thyroid disease Neg Hx     Anemia Neg Hx     Arrhythmia Neg Hx     Asthma Neg Hx     Clotting disorder Neg Hx     Fainting Neg Hx     Heart attack Neg Hx     Hyperlipidemia Neg Hx     Atrial Septal Defect Neg Hx     Melanoma Neg Hx      Physical Exam:   BP (!) 149/64 (BP Location: Right arm, Patient Position: Sitting, BP Method: Large (Automatic))   Pulse 64   Ht 5' 6" (1.676 m)   Wt 89.6 kg (197 lb 8.5 oz)   SpO2 97%   BMI 31.88 kg/m²      Constitutional: NAD, conversant  HEENT: Sclera anicteric, PERRLA, EOMI  Neck: No JVD, no carotid bruits  CV: RRR, 2/6 systolic ejection murmur with mechanical click, normal S1/S2  Pulm: Few crackles  GI: Abdomen soft, NTND, +BS  Extremities: 2+ LLE edema, 1+ RLE edema LE edema, warm and well perfused  Skin: No ecchymosis, erythema, or ulcers  Psych: AOx3, appropriate affect  Neuro: No gross deficits, normal  strength b/l    Labs:     Lab Results   Component Value Date     2019    K 4.5 2019     2019    CO2 27 2019    BUN 40 (H) 2019    CREATININE 1.5 (H) 2019    ANIONGAP 7 (L) 2019     Lab Results   Component Value Date    HGBA1C 5.5 2019     Lab Results   Component Value Date     (H) 2018    Lab Results   Component Value Date    WBC 5.88 2019    HGB 12.8 (L) 2019    HCT 39.6 (L) 2019     2019    GRAN 3.5 2019    GRAN 60.1 2019     Lab Results   Component Value Date    CHOL 194 2019    HDL 40 2019    LDLCALC 98.6 2019    TRIG 277 (H) 2019          Imagin. TTE (19)  CONCLUSIONS     1 - Mild left " ventricular enlargement.     2 - Low normal to mildly depressed left ventricular systolic function (EF 50-55%).     3 - Eccentric hypertrophy.     4 - Wall motion abnormalities.     5 - Impaired LV relaxation, elevated LAP (grade 2 diastolic dysfunction).     6 - Biatrial enlargement.     7 - Right ventricular enlargement with low normal systolic function.     8 - Aortic valve prosthesis, MATIAS = 0.96 cm2, AVAi = 0.5 cm2/m2, peak velocity = 2.62 m/s, mean gradient = 17 mmHg.     9 - Trivial aortic regurgitation.     10 - Moderate to moderate-severe (2-3+) mitral regurgitation.     11 - Moderate tricuspid regurgitation.     12 - Mild pulmonic regurgitation.     13 - Pulmonary hypertension. The estimated PA systolic pressure is 49 mmHg.     Assessment:     1. Upper extremity weakness    2. Coronary artery disease of bypass graft of native heart with stable angina pectoris    3. H/O mechanical aortic valve replacement    4. Bilateral carotid artery stenosis    5. Abdominal aortic aneurysm (AAA) without rupture    6. PVD (peripheral vascular disease)    7. Mixed hyperlipidemia    8. CKD (chronic kidney disease) stage 3, GFR 30-59 ml/min    9. Long term current use of anticoagulant therapy    10. Type 2 diabetes mellitus with stage 3 chronic kidney disease, without long-term current use of insulin    11. Obesity (BMI 30.0-34.9)    12. Hypertension   13. Edema of both lower extremities       Plan:   Upper extremity weakness  - Cervical x-ray last visit showed chronic degenerative changes   - Refer to neurology     Lower extremity edema   - Pt with b/l lower extremity edema L>R  - Order venous US    Coronary artery disease of bypass graft of native heart with stable angina pectoris  -- Cont ASA, statin, fibrate, and Imdur    H/O mechanical aortic valve replacement  -- Cont Warfarin. Last INR 2.7    Bilateral carotid artery disease  -- Carotid US from 3/11/15 reviewed   - 20-39% stenosis of b/l internal carotid arteries  -  Cont ASA and statin  - RTC in 6 months for follow up and carotid ultrasound     Aneurysm of aorta  -- Abdominal ultrasound from 5/10/18 reviewed  - Suprarenal AAA 3.32 cm    HTN  - Continue Coreg and Nifedipine     Signed:  Chantel Wilder PA-C  Interventional Cardiology   9/25/2019 8:45 AM

## 2019-10-04 ENCOUNTER — TELEPHONE (OUTPATIENT)
Dept: CARDIOLOGY | Facility: CLINIC | Age: 79
End: 2019-10-04

## 2019-10-04 ENCOUNTER — CLINICAL SUPPORT (OUTPATIENT)
Dept: CARDIOLOGY | Facility: CLINIC | Age: 79
End: 2019-10-04
Attending: PHYSICIAN ASSISTANT
Payer: MEDICARE

## 2019-10-04 DIAGNOSIS — I73.9 PVD (PERIPHERAL VASCULAR DISEASE): ICD-10-CM

## 2019-10-04 DIAGNOSIS — R60.0 EDEMA OF BOTH LOWER EXTREMITIES: ICD-10-CM

## 2019-10-04 PROCEDURE — 93970 CV US DOPPLER VENOUS LEGS BILATERAL (CUPID ONLY): ICD-10-PCS | Mod: HCNC,S$GLB,, | Performed by: INTERNAL MEDICINE

## 2019-10-04 PROCEDURE — 93970 EXTREMITY STUDY: CPT | Mod: HCNC,S$GLB,, | Performed by: INTERNAL MEDICINE

## 2019-10-04 NOTE — TELEPHONE ENCOUNTER
Spoke with patient and his wife regarding venous LE US result. Pt has no LE DVTs. Patient's wife expressed understanding.

## 2019-10-21 DIAGNOSIS — I15.0 RENOVASCULAR HYPERTENSION: ICD-10-CM

## 2019-10-21 RX ORDER — CARVEDILOL 12.5 MG/1
12.5 TABLET ORAL 2 TIMES DAILY
Qty: 180 TABLET | Refills: 4 | Status: SHIPPED | OUTPATIENT
Start: 2019-10-21 | End: 2021-01-28

## 2019-10-21 NOTE — TELEPHONE ENCOUNTER
----- Message from Bernarda Frye sent at 10/21/2019  8:41 AM CDT -----  Contact: Mari/wife/ [phone]  Patient is calling for an RX refill or new RX.  Is this a refill or new RX:    RX name and strength: carvedilol (COREG) 12.5 MG tablet 180 tablet   Directions (copy/paste from chart):   Take 1 tablet (12.5 mg total) by mouth 2 (two) times daily  Is this a 30 day or 90 day RX:  90  Local pharmacy or mail order pharmacy:    Pharmacy name and phone # (copy/paste from chart):   [facility] - [facility] [id number] [phone] (Phone)  [phone] (Fax)  Comments:

## 2019-10-24 ENCOUNTER — ANTI-COAG VISIT (OUTPATIENT)
Dept: CARDIOLOGY | Facility: CLINIC | Age: 79
End: 2019-10-24
Payer: MEDICARE

## 2019-10-24 DIAGNOSIS — Z79.01 LONG TERM CURRENT USE OF ANTICOAGULANT THERAPY: Primary | ICD-10-CM

## 2019-10-24 DIAGNOSIS — Z95.2 H/O MECHANICAL AORTIC VALVE REPLACEMENT: ICD-10-CM

## 2019-10-24 LAB — INR PPP: 1.7 (ref 2–3)

## 2019-10-24 PROCEDURE — 85610 POCT INR: ICD-10-PCS | Mod: QW,HCNC,S$GLB, | Performed by: INTERNAL MEDICINE

## 2019-10-24 PROCEDURE — 85610 PROTHROMBIN TIME: CPT | Mod: QW,HCNC,S$GLB, | Performed by: INTERNAL MEDICINE

## 2019-10-24 PROCEDURE — 93793 ANTICOAG MGMT PT WARFARIN: CPT | Mod: HCNC,S$GLB,,

## 2019-10-24 PROCEDURE — 93793 PR ANTICOAGULANT MGMT FOR PT TAKING WARFARIN: ICD-10-PCS | Mod: HCNC,S$GLB,,

## 2019-10-24 NOTE — PROGRESS NOTES
INR not at goal. Medications, chart, and patient findings reviewed. See calendar for adjustments to dose and follow up plan.  Findings: Pt states he missed a dose Sunday; he continues to have SOB on exertion & swelling in his LE (MD aware).  He denies any other changes.  Will instruct pt to take 10mg 10/24 & resume maintenance regimen.  Plan to re-assess in 6 weeks.  Pt advised on compliance & to contact CC when missed doses.   Patient was re-educated on situations that would require placing a call to the Coumadin Clinic, including bleeding or unusual bruising issues, changes in health, diet or medications,upcoming procedures that require warfarin interruption, and missed Coumadin dose(s). Patient expressed understanding that avoidance of consistency with these parameters could cause fluctuations in INR, leading to more frequent visits and increase risk of adverse events.

## 2019-11-11 DIAGNOSIS — E78.2 MIXED HYPERLIPIDEMIA: ICD-10-CM

## 2019-11-11 RX ORDER — ATORVASTATIN CALCIUM 80 MG/1
TABLET, FILM COATED ORAL
Qty: 90 TABLET | Refills: 0 | Status: SHIPPED | OUTPATIENT
Start: 2019-11-11 | End: 2020-01-14

## 2019-11-11 RX ORDER — ATORVASTATIN CALCIUM 80 MG/1
TABLET, FILM COATED ORAL
Qty: 90 TABLET | Refills: 3 | Status: SHIPPED | OUTPATIENT
Start: 2019-11-11 | End: 2020-01-14

## 2019-11-19 ENCOUNTER — LAB VISIT (OUTPATIENT)
Dept: LAB | Facility: HOSPITAL | Age: 79
End: 2019-11-19
Attending: INTERNAL MEDICINE
Payer: MEDICARE

## 2019-11-19 DIAGNOSIS — N18.30 CONTROLLED TYPE 2 DIABETES MELLITUS WITH STAGE 3 CHRONIC KIDNEY DISEASE, WITHOUT LONG-TERM CURRENT USE OF INSULIN: ICD-10-CM

## 2019-11-19 DIAGNOSIS — E11.22 CONTROLLED TYPE 2 DIABETES MELLITUS WITH STAGE 3 CHRONIC KIDNEY DISEASE, WITHOUT LONG-TERM CURRENT USE OF INSULIN: ICD-10-CM

## 2019-11-19 LAB
25(OH)D3+25(OH)D2 SERPL-MCNC: 33 NG/ML (ref 30–96)
ALBUMIN SERPL BCP-MCNC: 3.7 G/DL (ref 3.5–5.2)
ANION GAP SERPL CALC-SCNC: 6 MMOL/L (ref 8–16)
BASOPHILS # BLD AUTO: 0.04 K/UL (ref 0–0.2)
BASOPHILS NFR BLD: 0.7 % (ref 0–1.9)
BUN SERPL-MCNC: 46 MG/DL (ref 8–23)
CALCIUM SERPL-MCNC: 10 MG/DL (ref 8.7–10.5)
CHLORIDE SERPL-SCNC: 108 MMOL/L (ref 95–110)
CO2 SERPL-SCNC: 27 MMOL/L (ref 23–29)
CREAT SERPL-MCNC: 1.7 MG/DL (ref 0.5–1.4)
DIFFERENTIAL METHOD: ABNORMAL
EOSINOPHIL # BLD AUTO: 0.2 K/UL (ref 0–0.5)
EOSINOPHIL NFR BLD: 4.4 % (ref 0–8)
ERYTHROCYTE [DISTWIDTH] IN BLOOD BY AUTOMATED COUNT: 13.8 % (ref 11.5–14.5)
EST. GFR  (AFRICAN AMERICAN): 43.7 ML/MIN/1.73 M^2
EST. GFR  (NON AFRICAN AMERICAN): 37.8 ML/MIN/1.73 M^2
FERRITIN SERPL-MCNC: 128 NG/ML (ref 20–300)
GLUCOSE SERPL-MCNC: 116 MG/DL (ref 70–110)
HCT VFR BLD AUTO: 38.9 % (ref 40–54)
HGB BLD-MCNC: 12.4 G/DL (ref 14–18)
IMM GRANULOCYTES # BLD AUTO: 0.01 K/UL (ref 0–0.04)
IMM GRANULOCYTES NFR BLD AUTO: 0.2 % (ref 0–0.5)
IRON SERPL-MCNC: 83 UG/DL (ref 45–160)
LYMPHOCYTES # BLD AUTO: 1.8 K/UL (ref 1–4.8)
LYMPHOCYTES NFR BLD: 33.1 % (ref 18–48)
MAGNESIUM SERPL-MCNC: 1.8 MG/DL (ref 1.6–2.6)
MCH RBC QN AUTO: 30.4 PG (ref 27–31)
MCHC RBC AUTO-ENTMCNC: 31.9 G/DL (ref 32–36)
MCV RBC AUTO: 95 FL (ref 82–98)
MONOCYTES # BLD AUTO: 0.6 K/UL (ref 0.3–1)
MONOCYTES NFR BLD: 10.9 % (ref 4–15)
NEUTROPHILS # BLD AUTO: 2.7 K/UL (ref 1.8–7.7)
NEUTROPHILS NFR BLD: 50.7 % (ref 38–73)
NRBC BLD-RTO: 0 /100 WBC
PHOSPHATE SERPL-MCNC: 3.6 MG/DL (ref 2.7–4.5)
PLATELET # BLD AUTO: 152 K/UL (ref 150–350)
PMV BLD AUTO: 11.6 FL (ref 9.2–12.9)
POTASSIUM SERPL-SCNC: 5 MMOL/L (ref 3.5–5.1)
PTH-INTACT SERPL-MCNC: 127 PG/ML (ref 9–77)
RBC # BLD AUTO: 4.08 M/UL (ref 4.6–6.2)
SATURATED IRON: 26 % (ref 20–50)
SODIUM SERPL-SCNC: 141 MMOL/L (ref 136–145)
TOTAL IRON BINDING CAPACITY: 321 UG/DL (ref 250–450)
TRANSFERRIN SERPL-MCNC: 217 MG/DL (ref 200–375)
WBC # BLD AUTO: 5.4 K/UL (ref 3.9–12.7)

## 2019-11-19 PROCEDURE — 85025 COMPLETE CBC W/AUTO DIFF WBC: CPT | Mod: HCNC

## 2019-11-19 PROCEDURE — 82728 ASSAY OF FERRITIN: CPT | Mod: HCNC

## 2019-11-19 PROCEDURE — 83540 ASSAY OF IRON: CPT | Mod: HCNC

## 2019-11-19 PROCEDURE — 83970 ASSAY OF PARATHORMONE: CPT | Mod: HCNC

## 2019-11-19 PROCEDURE — 83735 ASSAY OF MAGNESIUM: CPT | Mod: HCNC

## 2019-11-19 PROCEDURE — 80069 RENAL FUNCTION PANEL: CPT | Mod: HCNC

## 2019-11-19 PROCEDURE — 82306 VITAMIN D 25 HYDROXY: CPT | Mod: HCNC

## 2019-11-19 PROCEDURE — 36415 COLL VENOUS BLD VENIPUNCTURE: CPT | Mod: HCNC

## 2019-12-05 ENCOUNTER — ANTI-COAG VISIT (OUTPATIENT)
Dept: CARDIOLOGY | Facility: CLINIC | Age: 79
End: 2019-12-05
Payer: MEDICARE

## 2019-12-05 DIAGNOSIS — Z95.2 H/O MECHANICAL AORTIC VALVE REPLACEMENT: ICD-10-CM

## 2019-12-05 DIAGNOSIS — Z79.01 LONG TERM CURRENT USE OF ANTICOAGULANT THERAPY: Primary | ICD-10-CM

## 2019-12-05 LAB — INR PPP: 2.1 (ref 2–3)

## 2019-12-05 PROCEDURE — 85610 POCT INR: ICD-10-PCS | Mod: QW,HCNC,S$GLB, | Performed by: INTERNAL MEDICINE

## 2019-12-05 PROCEDURE — 85610 PROTHROMBIN TIME: CPT | Mod: QW,HCNC,S$GLB, | Performed by: INTERNAL MEDICINE

## 2019-12-05 PROCEDURE — 93793 PR ANTICOAGULANT MGMT FOR PT TAKING WARFARIN: ICD-10-PCS | Mod: HCNC,S$GLB,,

## 2019-12-05 PROCEDURE — 93793 ANTICOAG MGMT PT WARFARIN: CPT | Mod: HCNC,S$GLB,,

## 2019-12-05 NOTE — PROGRESS NOTES
INR at goal. Medications and chart reviewed. No changes noted to necessitate adjustment of warfarin or follow-up plan. See calendar.  Findings: Discussed recall on Zantac w/ pt.  He is not sure if he still takes medication.  He will check.  He continues to have nosebleeds on occassions; however this occurred prior to his being placed on coumadin & is seasonal lasting ~15min.  Will maintain current regimen & re-assess in 6 weeks.   Patient was re-educated on situations that would require placing a call to the Coumadin Clinic, including bleeding or unusual bruising issues, changes in health, diet or medications,upcoming procedures that require warfarin interruption, and missed Coumadin dose(s). Patient expressed understanding that avoidance of consistency with these parameters could cause fluctuations in INR, leading to more frequent visits and increase risk of adverse events.

## 2020-01-03 ENCOUNTER — TELEPHONE (OUTPATIENT)
Dept: INTERNAL MEDICINE | Facility: CLINIC | Age: 80
End: 2020-01-03

## 2020-01-03 NOTE — TELEPHONE ENCOUNTER
----- Message from Brianna Tian sent at 1/3/2020 10:11 AM CST -----  Contact: spoouse [phone]   Patient needs a sooner appt due to swollen feet and numbness in his fingers.    Request call back.    Please call and advise  Thank you

## 2020-01-06 ENCOUNTER — OFFICE VISIT (OUTPATIENT)
Dept: INTERNAL MEDICINE | Facility: CLINIC | Age: 80
End: 2020-01-06
Payer: MEDICARE

## 2020-01-06 ENCOUNTER — LAB VISIT (OUTPATIENT)
Dept: LAB | Facility: HOSPITAL | Age: 80
End: 2020-01-06
Attending: INTERNAL MEDICINE
Payer: MEDICARE

## 2020-01-06 VITALS
WEIGHT: 202.63 LBS | DIASTOLIC BLOOD PRESSURE: 62 MMHG | SYSTOLIC BLOOD PRESSURE: 114 MMHG | HEART RATE: 56 BPM | HEIGHT: 66 IN | OXYGEN SATURATION: 95 % | BODY MASS INDEX: 32.56 KG/M2

## 2020-01-06 DIAGNOSIS — I25.119 ATHEROSCLEROSIS OF NATIVE CORONARY ARTERY OF NATIVE HEART WITH ANGINA PECTORIS: ICD-10-CM

## 2020-01-06 DIAGNOSIS — I72.3 ANEURYSM ARTERY, ILIAC: ICD-10-CM

## 2020-01-06 DIAGNOSIS — I10 ESSENTIAL HYPERTENSION: ICD-10-CM

## 2020-01-06 DIAGNOSIS — E11.22 TYPE 2 DIABETES MELLITUS WITH STAGE 3 CHRONIC KIDNEY DISEASE, WITHOUT LONG-TERM CURRENT USE OF INSULIN: ICD-10-CM

## 2020-01-06 DIAGNOSIS — N18.30 TYPE 2 DIABETES MELLITUS WITH STAGE 3 CHRONIC KIDNEY DISEASE, WITHOUT LONG-TERM CURRENT USE OF INSULIN: ICD-10-CM

## 2020-01-06 DIAGNOSIS — N25.81 HYPERPARATHYROIDISM DUE TO RENAL INSUFFICIENCY: ICD-10-CM

## 2020-01-06 DIAGNOSIS — R06.09 OTHER FORMS OF DYSPNEA: ICD-10-CM

## 2020-01-06 DIAGNOSIS — I25.708 CORONARY ARTERY DISEASE OF BYPASS GRAFT OF NATIVE HEART WITH STABLE ANGINA PECTORIS: ICD-10-CM

## 2020-01-06 DIAGNOSIS — I50.9 ACUTE ON CHRONIC CONGESTIVE HEART FAILURE, UNSPECIFIED HEART FAILURE TYPE: ICD-10-CM

## 2020-01-06 DIAGNOSIS — I71.40 ABDOMINAL AORTIC ANEURYSM (AAA) WITHOUT RUPTURE: ICD-10-CM

## 2020-01-06 DIAGNOSIS — I27.20 PULMONARY HYPERTENSION, UNSPECIFIED: ICD-10-CM

## 2020-01-06 DIAGNOSIS — I50.9 ACUTE ON CHRONIC CONGESTIVE HEART FAILURE, UNSPECIFIED HEART FAILURE TYPE: Primary | ICD-10-CM

## 2020-01-06 LAB
ALBUMIN SERPL BCP-MCNC: 3.8 G/DL (ref 3.5–5.2)
ALP SERPL-CCNC: 67 U/L (ref 55–135)
ALT SERPL W/O P-5'-P-CCNC: 16 U/L (ref 10–44)
ANION GAP SERPL CALC-SCNC: 6 MMOL/L (ref 8–16)
AST SERPL-CCNC: 20 U/L (ref 10–40)
BASOPHILS # BLD AUTO: 0.04 K/UL (ref 0–0.2)
BASOPHILS NFR BLD: 0.7 % (ref 0–1.9)
BILIRUB SERPL-MCNC: 0.6 MG/DL (ref 0.1–1)
BNP SERPL-MCNC: 489 PG/ML (ref 0–99)
BUN SERPL-MCNC: 38 MG/DL (ref 8–23)
CALCIUM SERPL-MCNC: 9.7 MG/DL (ref 8.7–10.5)
CHLORIDE SERPL-SCNC: 109 MMOL/L (ref 95–110)
CO2 SERPL-SCNC: 27 MMOL/L (ref 23–29)
CREAT SERPL-MCNC: 1.7 MG/DL (ref 0.5–1.4)
DIFFERENTIAL METHOD: ABNORMAL
EOSINOPHIL # BLD AUTO: 0.2 K/UL (ref 0–0.5)
EOSINOPHIL NFR BLD: 3.9 % (ref 0–8)
ERYTHROCYTE [DISTWIDTH] IN BLOOD BY AUTOMATED COUNT: 13.8 % (ref 11.5–14.5)
EST. GFR  (AFRICAN AMERICAN): 43.7 ML/MIN/1.73 M^2
EST. GFR  (NON AFRICAN AMERICAN): 37.8 ML/MIN/1.73 M^2
GLUCOSE SERPL-MCNC: 74 MG/DL (ref 70–110)
HCT VFR BLD AUTO: 35.4 % (ref 40–54)
HGB BLD-MCNC: 11.9 G/DL (ref 14–18)
LYMPHOCYTES # BLD AUTO: 1.7 K/UL (ref 1–4.8)
LYMPHOCYTES NFR BLD: 29.2 % (ref 18–48)
MCH RBC QN AUTO: 30.7 PG (ref 27–31)
MCHC RBC AUTO-ENTMCNC: 33.6 G/DL (ref 32–36)
MCV RBC AUTO: 92 FL (ref 82–98)
MONOCYTES # BLD AUTO: 0.7 K/UL (ref 0.3–1)
MONOCYTES NFR BLD: 11.6 % (ref 4–15)
NEUTROPHILS # BLD AUTO: 3.1 K/UL (ref 1.8–7.7)
NEUTROPHILS NFR BLD: 54.6 % (ref 38–73)
PLATELET # BLD AUTO: 138 K/UL (ref 150–350)
PMV BLD AUTO: 10.6 FL (ref 9.2–12.9)
POTASSIUM SERPL-SCNC: 5 MMOL/L (ref 3.5–5.1)
PROT SERPL-MCNC: 7.2 G/DL (ref 6–8.4)
RBC # BLD AUTO: 3.87 M/UL (ref 4.6–6.2)
SODIUM SERPL-SCNC: 142 MMOL/L (ref 136–145)
WBC # BLD AUTO: 5.69 K/UL (ref 3.9–12.7)

## 2020-01-06 PROCEDURE — 1159F PR MEDICATION LIST DOCUMENTED IN MEDICAL RECORD: ICD-10-PCS | Mod: HCNC,S$GLB,, | Performed by: INTERNAL MEDICINE

## 2020-01-06 PROCEDURE — 99999 PR PBB SHADOW E&M-EST. PATIENT-LVL V: ICD-10-PCS | Mod: PBBFAC,HCNC,, | Performed by: INTERNAL MEDICINE

## 2020-01-06 PROCEDURE — 3074F PR MOST RECENT SYSTOLIC BLOOD PRESSURE < 130 MM HG: ICD-10-PCS | Mod: HCNC,CPTII,S$GLB, | Performed by: INTERNAL MEDICINE

## 2020-01-06 PROCEDURE — 99214 PR OFFICE/OUTPT VISIT, EST, LEVL IV, 30-39 MIN: ICD-10-PCS | Mod: HCNC,S$GLB,, | Performed by: INTERNAL MEDICINE

## 2020-01-06 PROCEDURE — 99999 PR PBB SHADOW E&M-EST. PATIENT-LVL V: CPT | Mod: PBBFAC,HCNC,, | Performed by: INTERNAL MEDICINE

## 2020-01-06 PROCEDURE — 85025 COMPLETE CBC W/AUTO DIFF WBC: CPT | Mod: HCNC

## 2020-01-06 PROCEDURE — 3078F DIAST BP <80 MM HG: CPT | Mod: HCNC,CPTII,S$GLB, | Performed by: INTERNAL MEDICINE

## 2020-01-06 PROCEDURE — 99214 OFFICE O/P EST MOD 30 MIN: CPT | Mod: HCNC,S$GLB,, | Performed by: INTERNAL MEDICINE

## 2020-01-06 PROCEDURE — 3078F PR MOST RECENT DIASTOLIC BLOOD PRESSURE < 80 MM HG: ICD-10-PCS | Mod: HCNC,CPTII,S$GLB, | Performed by: INTERNAL MEDICINE

## 2020-01-06 PROCEDURE — 1101F PT FALLS ASSESS-DOCD LE1/YR: CPT | Mod: HCNC,CPTII,S$GLB, | Performed by: INTERNAL MEDICINE

## 2020-01-06 PROCEDURE — 1101F PR PT FALLS ASSESS DOC 0-1 FALLS W/OUT INJ PAST YR: ICD-10-PCS | Mod: HCNC,CPTII,S$GLB, | Performed by: INTERNAL MEDICINE

## 2020-01-06 PROCEDURE — 80053 COMPREHEN METABOLIC PANEL: CPT | Mod: HCNC

## 2020-01-06 PROCEDURE — 3074F SYST BP LT 130 MM HG: CPT | Mod: HCNC,CPTII,S$GLB, | Performed by: INTERNAL MEDICINE

## 2020-01-06 PROCEDURE — 1159F MED LIST DOCD IN RCRD: CPT | Mod: HCNC,S$GLB,, | Performed by: INTERNAL MEDICINE

## 2020-01-06 PROCEDURE — 1126F PR PAIN SEVERITY QUANTIFIED, NO PAIN PRESENT: ICD-10-PCS | Mod: HCNC,S$GLB,, | Performed by: INTERNAL MEDICINE

## 2020-01-06 PROCEDURE — 83880 ASSAY OF NATRIURETIC PEPTIDE: CPT | Mod: HCNC

## 2020-01-06 PROCEDURE — 36415 COLL VENOUS BLD VENIPUNCTURE: CPT | Mod: HCNC

## 2020-01-06 PROCEDURE — 1126F AMNT PAIN NOTED NONE PRSNT: CPT | Mod: HCNC,S$GLB,, | Performed by: INTERNAL MEDICINE

## 2020-01-06 RX ORDER — FUROSEMIDE 20 MG/1
TABLET ORAL
Qty: 90 TABLET | Refills: 4 | Status: SHIPPED | OUTPATIENT
Start: 2020-01-06 | End: 2020-01-14

## 2020-01-06 NOTE — PROGRESS NOTES
Subjective:       Patient ID: Dariel Urbina is a 79 y.o. male.    Chief Complaint: Foot Swelling    Mr. Urbina is a 79 year-old male with CAD s/p CABG, s/p AVR (on warfarin), HTN, HLD, AAA, HFpEF (EF 50-55%) that presents for follow-up.    He has been experiencing worsening LÓPEZ over the last few months. He states he gets winded from taking the garbage out (about 10-20 feet). He was seen by cardiology in 9/2019. At that time, he was able to walk 70-80 feet before feeling SOB. He has gained 7lbs since that visit. He now describes SOB while lying down (more SOB when lying on L side than R side). He has chronic lower extremity edema. Echo (2018) showed EF 50-55%, grade II dd, mod-severe MR, and PASP 49mmHg. He takes lasix, but is unsure how much. He thinks he takes 40mg daily, however the med reconciliation he brought in only says 20mg.    He has also been experiencing numbness/tingling in his hands. He has chronic R-sided numbness in digits 1-3 2/2 cervical radiculopathy. He has now been experiencing numbness/tingling in L hand (3 episodes).     Review of Systems   Constitutional: Negative for chills and fever.   HENT: Negative for congestion, sore throat and trouble swallowing.    Eyes: Negative for visual disturbance.   Respiratory: Positive for shortness of breath (see HPI). Negative for cough and wheezing.    Cardiovascular: Positive for leg swelling. Negative for chest pain and palpitations.   Gastrointestinal: Negative for abdominal pain, blood in stool, constipation, diarrhea, nausea and vomiting.   Genitourinary: Negative for dysuria and hematuria.   Musculoskeletal: Negative for arthralgias and myalgias.   Skin: Negative for rash.   Neurological: Positive for numbness (see HPI). Negative for dizziness, light-headedness and headaches.   Psychiatric/Behavioral: Negative for agitation and confusion.       Objective:      Physical Exam   Constitutional: He appears well-developed and well-nourished. No  distress.   HENT:   Head: Normocephalic and atraumatic.   Mouth/Throat: Oropharynx is clear and moist. No oropharyngeal exudate.   Eyes: Pupils are equal, round, and reactive to light. Conjunctivae and EOM are normal.   Neck: Normal range of motion. Neck supple.   Cardiovascular: Normal rate and regular rhythm.   Murmur (ejection click with systolic murmur best heard at LSB in 2nd IC space) heard.  Pulmonary/Chest: Effort normal and breath sounds normal. No respiratory distress. He has no wheezes. He has no rales.   Abdominal: Soft. Bowel sounds are normal. He exhibits no distension. There is no tenderness. There is no guarding.   Musculoskeletal: He exhibits edema (2+ pitting edema BL lower extremities). He exhibits no tenderness.   Neurological: He is alert. He displays normal reflexes. He exhibits normal muscle tone.   Skin: Skin is warm and dry. No rash noted.   Psychiatric: He has a normal mood and affect. His behavior is normal.       Assessment/Plan:       Acute on chronic congestive heart failure, unspecified heart failure type  Coronary artery disease of bypass graft of native heart with stable angina pectoris  Other forms of dyspnea  Worsening LÓPEZ + new PND likely related to worsening HF  BNP + Echo ordered as last echo was from 2018  CBC to r/o anemia as etiology of SOB in patient with extensive CAD  Will refer back to Cardioloy    Type 2 diabetes mellitus with stage 3 chronic kidney disease, without long-term current use of insulin  sCr trending up over last year  Baseline sCr ~1.3-1.4, was 1.7 in November  CMP today    Pulmonary hypertension, unspecified  Echo (2018) showed pHTN with PASP 49 mmHg  Ambulatory referral to Sleep Medicine        Follow-up in 1 month    Abdi Kumar MD  Medical Resident  [facility]  Pager: [phone]

## 2020-01-07 NOTE — PROGRESS NOTES
Cardiology Clinic Note  Reason for Visit: CAD s/p CABG    HPI:     Dariel Urbina is a 79 y.o. M, who presents to establish care with general cardiology.    He reports that he has been having progressive bilateral lower extremity edema, orthopnea, PND over the past 3-4 weeks. He denies dyspnea at rest but is short of breath after walking 10-15 minutes. For the edema, he was placed on lasix 20mg daily without improvement. He tried doubling the dose without improvement.     He has chronic issues with nosebleeds that have been cauterized by ENT. No further cautery can be performed, per the patient.    To note, he had a prior PET stress in 3/2018 with ischemia in the inferolateral wall from base to distal. OM2 graft not found of [facility] 3/2018.    Medical: CAD s/p CABG (3/27/18; LIMA-LAD, SVG-OM2 [likely occluded]), porcelain aorta, mechanical aortic valve (~1992/93), suprarenal AAA, bilateral carotid artery stenosis, DM, HLD, CKD III  Surgical: CABG, hernia, spine, vasectomy, aortic valve replacement  Family: mother, father, and brother with heart disease/heart failure   Social: previously smoked 1 PPD x20y (quit 1980), no alcohol use    ROS:    Constitution: Negative for fever or chills.  HENT: Negative for  headaches.  Eyes: Negative for blurred vision.   Cardiovascular: See above  Pulmonary: Negative for SOB. Negative for cough.   Gastrointestinal: Negative for nausea/vomiting.   : Negative for dysuria.   Skin: Negative for rashes.  Neurological: Negative for focal weakness.  Psychological: Negative for depression.  PMH:     Past Medical History:   Diagnosis Date    Anemia of chronic renal failure, stage 3 (moderate) 5/27/2015    Atherosclerosis of coronary artery bypass graft of native heart without angina pectoris 9/11/2012    3-27-18 [facility] Two vessel coronary artery disease.   Prosthetic aortic valve.   Porcelain aorta.   Patent LIMA graft.    Bilateral carotid artery disease 2/9/2017    Bleeding from  the nose     Bleeding nose 3/21/2018    Cataract     CKD (chronic kidney disease) stage 3, GFR 30-59 ml/min 5/27/2015    Claudication of left lower extremity 9/17/2014    Colon polyp     Gastroesophageal reflux disease without esophagitis 3/19/2018    Gastrointestinal hemorrhage associated with intestinal diverticulosis 4/1/2018    H/O mechanical aortic valve replacement 9/17/2014    History of gout 9/26/2012    Hyperparathyroidism due to renal insufficiency 7/27/2015    Internal hemorrhoid 4/3/2018    Long term current use of anticoagulant therapy 9/26/2012    Mechanical heart valve present     Metabolic acidosis with normal anion gap and bicarbonate losses 3/20/2018    Mixed hyperlipidemia 9/26/2012    NSTEMI (non-ST elevated myocardial infarction) 3/21/2018    Obesity, diabetes, and hypertension syndrome 2/23/2016    PVD (peripheral vascular disease) 9/11/2012    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/2012    Type 2 diabetes mellitus with diabetic peripheral angiopathy without gangrene 5/27/2015    Type 2 diabetes mellitus with stage 3 chronic kidney disease, without long-term current use of insulin 10/2/2013     Past Surgical History:   Procedure Laterality Date    CARDIAC CATHETERIZATION      CARDIAC VALVE REPLACEMENT      CARDIAC VALVE SURGERY      COLON SURGERY      COLONOSCOPY N/A 3/31/2017    Procedure: COLONOSCOPY;  Surgeon: Bruno Raymond MD;  Location: [facility] (4TH FLR);  Service: Endoscopy;  Laterality: N/A;  Patient's wife requesting date.    COLONOSCOPY N/A 4/3/2018    Procedure: COLONOSCOPY;  Surgeon: Bonifacio Pelletier MD;  Location: [facility]O (2ND FLR);  Service: Endoscopy;  Laterality: N/A;    COLONOSCOPY N/A 8/13/2018    Procedure: COLONOSCOPY;  Surgeon: Kam Barba MD;  Location: [facility] ENDO (2ND FLR);  Service: Endoscopy;  Laterality: N/A;  2nd floor: PA pressure 49; hx of moderate-severe valve disease     per Coumadin clinic-Patient can hold 5 days with lovenox bridge        ok to schedule per Katarina    CORONARY ANGIOPLASTY      CORONARY ARTERY BYPASS GRAFT      HERNIA REPAIR      SPINE SURGERY      VASECTOMY       Allergies:     Review of patient's allergies indicates:   Allergen Reactions    Fosinopril      Intolerance- elevates potassium level      Losartan      Intolerance- elevates potassium level     Medications:     Current Outpatient Medications on File Prior to Visit   Medication Sig Dispense Refill    allopurinol (ZYLOPRIM) 100 MG tablet TAKE 1 TABLET EVERY DAY 90 tablet 3    aspirin (ECOTRIN) 81 MG EC tablet Take 1 tablet (81 mg total) by mouth once daily.  0    atorvastatin (LIPITOR) 80 MG tablet TAKE 1 TABLET ONE TIME DAILY 90 tablet 3    atorvastatin (LIPITOR) 80 MG tablet TAKE 1 TABLET EVERY DAY (Patient not taking: Reported on 1/6/2020) 90 tablet 0    carvedilol (COREG) 12.5 MG tablet Take 1 tablet (12.5 mg total) by mouth 2 (two) times daily. 180 tablet 4    clotrimazole-betamethasone 1-0.05% (LOTRISONE) cream Apply topically once daily. in between the 4th and 5th toes left foot. (Patient not taking: Reported on 1/6/2020) 45 g 1    COLCRYS 0.6 mg tablet TAKE 1 TABLET (0.6 MG TOTAL) BY MOUTH 2 (TWO) TIMES DAILY AS NEEDED (GOUT). (Patient not taking: Reported on 1/6/2020) 60 tablet 0    enoxaparin (LOVENOX) 120 mg/0.8 mL Syrg Inject 0.8 mLs (120 mg total) into the skin every 24 hours as needed (as directed by coumadin clinic). (Patient not taking: Reported on 9/25/2019) 10 Syringe 1    fenofibrate micronized (LOFIBRA) 200 MG Cap Take 200 mg by mouth daily with breakfast.      ferrous sulfate 325 (65 FE) MG EC tablet Take 1 tablet (325 mg total) by mouth once daily. (Patient not taking: Reported on 1/6/2020) 90 tablet 4    furosemide (LASIX) 20 MG tablet One tablet one time daily 90 tablet 4    glimepiride (AMARYL) 1 MG tablet Take 1 tablet (1 mg total) by mouth every morning. 90 tablet 3    isosorbide mononitrate (IMDUR) 120 MG 24 hr tablet TAKE 1  TABLET EVERY DAY 90 tablet 4    NIFEdipine (PROCARDIA-XL) 60 MG (OSM) 24 hr tablet Take 1 tablet (60 mg total) by mouth once daily. 90 tablet 4    nitroGLYCERIN (NITROSTAT) 0.4 MG SL tablet Place 1 tablet (0.4 mg total) under the tongue every 5 (five) minutes as needed. As needed for chest pain 90 tablet 4    omega-3 acid ethyl esters (LOVAZA) 1 gram capsule Take 2 capsules (2 g total) by mouth 2 (two) times daily. 360 capsule 5    ranitidine (ZANTAC) 300 MG tablet       warfarin (COUMADIN) 5 MG tablet TAKE 1 & 1/2 TO 2 TABLETS (7.5 TO 10 MG TOTAL) BY  MOUTH DAILY AS DIRECTED BY COUMADIN CLINIC 180 tablet 3     No current facility-administered medications on file prior to visit.      Social History:     Social History     Tobacco Use    Smoking status: Former Smoker     Packs/day: 1.00     Years: 20.00     Pack years: 20.00     Last attempt to quit: 1980     Years since quittin.3    Smokeless tobacco: Never Used   Substance Use Topics    Alcohol use: No     Family History:     Family History   Problem Relation Age of Onset    Heart failure Mother     Heart disease Mother     Heart failure Father     Heart disease Father     Alcohol abuse Father     Heart failure Brother     Heart disease Brother     Diabetes Brother     No Known Problems Sister     No Known Problems Maternal Grandmother     No Known Problems Maternal Grandfather     No Known Problems Paternal Grandmother     No Known Problems Paternal Grandfather     Heart disease Sister     No Known Problems Maternal Aunt     No Known Problems Maternal Uncle     No Known Problems Paternal Aunt     No Known Problems Paternal Uncle     Amblyopia Neg Hx     Blindness Neg Hx     Cancer Neg Hx     Cataracts Neg Hx     Glaucoma Neg Hx     Hypertension Neg Hx     Macular degeneration Neg Hx     Retinal detachment Neg Hx     Strabismus Neg Hx     Stroke Neg Hx     Thyroid disease Neg Hx     Anemia Neg Hx     Arrhythmia Neg  "Hx     Asthma Neg Hx     Clotting disorder Neg Hx     Fainting Neg Hx     Heart attack Neg Hx     Hyperlipidemia Neg Hx     Atrial Septal Defect Neg Hx     Melanoma Neg Hx      Physical Exam:   /72   Pulse (!) 58   Ht 5' 6" (1.676 m)   Wt 91.6 kg (201 lb 15.1 oz)   SpO2 97%   BMI 32.59 kg/m²      Constitutional: No apparent distress, conversant  HEENT: Sclera anicteric, extraocular movements intact  Neck: Jugular venous distension to mid neck at 45 degrees, +HJR, no carotid bruits  CV: Regular rate and rhythm, 1/6 systolic murmur at RUSB with valve clicks, 2/6 holosystolic murmur that increases with respiration at LLSB, 2/6 holosystolic murmur at apex  Pulm: Bibasilar crackles  GI: Abdomen soft, no palpable masses  Extremities: 1+ tense bilateral lower extremity edema, warm with palpable pulses  Skin: No ecchymosis, erythema, or ulcers  Psych: Alert and oriented to person place location, appropriate affect  Neuro: No focal deficits    Labs:     Blood Tests:  Lab Results   Component Value Date     (H) 01/06/2020     01/06/2020    K 5.0 01/06/2020     01/06/2020    CO2 27 01/06/2020    BUN 38 (H) 01/06/2020    CREATININE 1.7 (H) 01/06/2020    GLU 74 01/06/2020    HGBA1C 5.5 07/17/2019    MG 1.8 11/19/2019    AST 20 01/06/2020    ALT 16 01/06/2020    ALBUMIN 3.8 01/06/2020    PROT 7.2 01/06/2020    BILITOT 0.6 01/06/2020    WBC 5.69 01/06/2020    HGB 11.9 (L) 01/06/2020    HCT 35.4 (L) 01/06/2020    MCV 92 01/06/2020     (L) 01/06/2020    INR 2.1 12/05/2019    INR 1.9 (H) 08/20/2018    INR 2.0 (H) 03/15/2010    PSA 0.35 07/27/2012    TSH 3.029 02/02/2017       Lab Results   Component Value Date    CHOL 194 07/17/2019    HDL 40 07/17/2019    TRIG 277 (H) 07/17/2019       Lab Results   Component Value Date    LDLCALC 98.6 07/17/2019       Urine Tests:  Lab Results   Component Value Date    COLORU Yellow 11/19/2019    APPEARANCEUA Clear 11/19/2019    PHUR 5.0 11/19/2019    " SPECGRAV 1.015 11/19/2019    PROTEINUA 2+ (A) 11/19/2019    GLUCUA Negative 11/19/2019    KETONESU Negative 11/19/2019    BILIRUBINUA Negative 11/19/2019    OCCULTUA Negative 11/19/2019    NITRITE Negative 11/19/2019    UROBILINOGEN Negative 04/01/2018    LEUKOCYTESUR Negative 11/19/2019    PROTEINURINE 126 (H) 11/19/2019    PROTEINURINE 8 03/21/2018    CREATRANDUR 102.0 11/19/2019    UTPCR 1.24 (H) 11/19/2019       Imaging:     Echocardiogram  TTE 4/26/18  CONCLUSIONS     1 - Mild left ventricular enlargement.     2 - Low normal to mildly depressed left ventricular systolic function (EF 50-55%).     3 - Eccentric hypertrophy.     4 - Wall motion abnormalities.     5 - Impaired LV relaxation, elevated LAP (grade 2 diastolic dysfunction).     6 - Biatrial enlargement.     7 - Right ventricular enlargement with low normal systolic function.     8 - Aortic valve prosthesis, MATIAS = 0.96 cm2, AVAi = 0.5 cm2/m2, peak velocity = 2.62 m/s, mean gradient = 17 mmHg.     9 - Trivial aortic regurgitation.     10 - Moderate to moderate-severe (2-3+) mitral regurgitation.     11 - Moderate tricuspid regurgitation.     12 - Mild pulmonic regurgitation.     13 - Pulmonary hypertension. The estimated PA systolic pressure is 49 mmHg.     TTE 3/21/18  CONCLUSIONS     1 - Limited portable study.     2 - Low normal to mildly depressed left ventricular systolic function (EF 50-55%).     3 - Normal right ventricular systolic function .     4 - Normal left ventricular diastolic function.     5 - Mild mitral regurgitation.     6 - Aortic valve mechanical prosthesis - no Doppler interrogation performed.     7 - Consider full color Doppler study if clinically indicated.     TTE 5/3/17  CONCLUSIONS     1 - Eccentric LVH with normal left ventricular systolic function (EF 60-65%).     2 - Severe left atrial enlargement.     3 - Normal right ventricular systolic function .     4 - Aortic valve mechanical prosthesis with a mean gradient of 17mmHg.      5 - Moderate to severe mitral regurgitation.     6 - Moderate tricuspid regurgitation.     7 - Pulmonary hypertension. The estimated PA systolic pressure is 54 mmHg.     Stress testing  PET 3/23/18  CONCLUSIONS: ABNORMAL MYOCARDIAL PERFUSION PET STRESS TEST  1. There is a moderate sized mild to moderate ischemia in the base to distal inferolateral wall. This defect comprises 15 % of the left ventricular myocardium.   2. Resting wall motion is physiologic. Stress wall motion is physiologic.   3. LV function is normal at rest and stress.  (normal is >= 51%)  4. The ventricular volumes are normal at rest and stress.   5. The extracardiac distribution of radioactivity is normal.   6. When compared to the previous study from 12/8/16, there is no signficant change.    PET 12/8/16  CONCLUSIONS: ABNORMAL MYOCARDIAL PERFUSION PET STRESS TEST  1. There is a small sized mild ischemia in the distal inferolateral wall in the usual distribution of the distal Left Circumflex Artery. This defect comprises 10 % of the left ventricular myocardium.   2. Resting wall motion is physiologic. Stress wall motion is physiologic.   3. Visually estimated LV function is normal at rest and normal at stress.   4. The ventricular volumes are normal at rest and stress.   5. The extracardiac distribution of radioactivity is normal.   6. There was no previous study available to compare.    Cath Lab  [facility] 3/27/18  Diagnostic:      Patient has a right dominant coronary artery.      - Left Main Coronary Artery:           The distal LM has a 75% stenosis. There is JAMILAH 3 flow.     - Left Anterior Descending Artery:           The mid LAD has chronic total occlusion. There is JAMILAH 0 flow.     - Left Circumflex Artery:           The LCX is diffusely diseased (75). There is JAMILAH 3 flow.     - Right Coronary Artery:           The RCA has luminal irregularities. There is JAMILAH 3 flow.     - CAMARGO To LAD:           The LIMA to LAD is normal. There is JAMILAH 3  flow.     - SVG To OM2:           The SVG to OM2 was not found.     - Radial:           The radial was not studied.    Other  Abdominal aorta 5/10/18  CONCLUSIONS   There is an Abdominal Aortic Aneurysm largest at the Suprarenal level measuring 3.32 cm.  There is an accelerated PSV at the left common iliac level of 310 cm/s  indicating a possible stenosis.    Abdominal aorta 17  CONCLUSIONS   There is an Abdominal Aortic Aneurysm largest at the Suprarenal level measuring 3.35 cm.  There is an accelerated PSV at the left common iliac level of 317 cm/s  indicating a possible stenosis.    EK/1/18  Sinus bradycardia with 1st degree A-V block with occasional Premature ventricular complexes  Left atrial abnormality/enlargement  Left bundle branch block    Assessment:     1. H/O mechanical aortic valve replacement    2. Coronary artery disease of bypass graft of native heart with stable angina pectoris    3. Bilateral carotid artery stenosis    4. Abdominal aortic aneurysm (AAA) without rupture    5. CKD (chronic kidney disease) stage 3, GFR 30-59 ml/min    6. Essential hypertension    7. Mixed hyperlipidemia    8. PVD (peripheral vascular disease)    9. Obesity (BMI 30.0-34.9)    10. Type 2 diabetes mellitus with stage 3 chronic kidney disease, without long-term current use of insulin    11. Acute on chronic combined systolic and diastolic heart failure        Plan:     Acute on chronic heart failure with mid range EF  Stop lasix. Start on bumex 2mg BID.  Check CMP in 1 week.  Continue coreg 12.5mg BID  Obtain echo    Coronary artery disease of bypass graft of native heart with stable angina pectoris  Continue ASA 81mg daily  Stop atorvastatin. Start rosuvastatin 40mg daily.  Continue beta blocker, as above  Continue nifedipine 60mg daily, ISMN 120mg daily    H/O mechanical aortic valve replacement  Echo, as above  Continue warfarin for anticoagulation    Bilateral carotid artery stenosis  Mixed  hyperlipidemia  PVD (peripheral vascular disease)  Abdominal aortic aneurysm (AAA) without rupture  Lipid panel 7/17/19 with LDL 98,   Change statin, as above  Continue fenofibrate daily, omega 3 FFA    Suprarenal AAA measuring 3.32 cm (5/2018)  BP and lipid control, ASA 81mg qd.    Essential hypertension  CKD (chronic kidney disease) stage 3, GFR 30-59 ml/min  BP at goal  Continue current medications.    Baseline Cr 1.7 (1/2020)     Obesity (BMI 30.0-34.9)  Type 2 diabetes mellitus with stage 3 chronic kidney disease, without long-term current use of insulin  Followed by PCP    I spent 40 minutes face-to-face with the patient, >50% in discussion of the diagnosis and the importance of compliance with the treatment plan.    Signed:  Yuriy Shirley MD  Cardiology     1/14/2020 8:40 AM    Follow-up:     Future Appointments   Date Time Provider Department Center   1/14/2020  8:00 AM Liborio Shirley III, MD [facility] CARDIO [facility]   1/16/2020  8:00 AM LAB, COUMADIN [facility] COUMAD [facility]

## 2020-01-08 NOTE — PROGRESS NOTES
"I have personally taken the history and examined this patient and agree with the resident's note as stated above with the following thoughts:  /62 (BP Location: Right arm, Patient Position: Sitting, BP Method: Large (Manual))   Pulse (!) 56   Ht 5' 6" (1.676 m)   Wt 91.9 kg (202 lb 9.6 oz)   SpO2 95%   BMI 32.70 kg/m²     I would like him to follow up Cardiology.  He has multiple reasons for his shortness of breath.  We will check his CBC and chest x-ray and also we will will recheck at echo on him.  Check a BNP.  I know he has had some renal failure with over diuresis in the past some not going to increase his diuresis at this time.    "

## 2020-01-14 ENCOUNTER — ANTI-COAG VISIT (OUTPATIENT)
Dept: CARDIOLOGY | Facility: CLINIC | Age: 80
End: 2020-01-14
Payer: MEDICARE

## 2020-01-14 ENCOUNTER — OFFICE VISIT (OUTPATIENT)
Dept: CARDIOLOGY | Facility: CLINIC | Age: 80
End: 2020-01-14
Payer: MEDICARE

## 2020-01-14 VITALS
DIASTOLIC BLOOD PRESSURE: 72 MMHG | OXYGEN SATURATION: 97 % | HEIGHT: 66 IN | BODY MASS INDEX: 32.45 KG/M2 | WEIGHT: 201.94 LBS | HEART RATE: 58 BPM | SYSTOLIC BLOOD PRESSURE: 124 MMHG

## 2020-01-14 DIAGNOSIS — Z95.2 H/O MECHANICAL AORTIC VALVE REPLACEMENT: ICD-10-CM

## 2020-01-14 DIAGNOSIS — I73.9 PVD (PERIPHERAL VASCULAR DISEASE): ICD-10-CM

## 2020-01-14 DIAGNOSIS — Z95.2 H/O MECHANICAL AORTIC VALVE REPLACEMENT: Primary | ICD-10-CM

## 2020-01-14 DIAGNOSIS — Z79.01 LONG TERM CURRENT USE OF ANTICOAGULANT THERAPY: Primary | ICD-10-CM

## 2020-01-14 DIAGNOSIS — N18.30 TYPE 2 DIABETES MELLITUS WITH STAGE 3 CHRONIC KIDNEY DISEASE, WITHOUT LONG-TERM CURRENT USE OF INSULIN: ICD-10-CM

## 2020-01-14 DIAGNOSIS — I71.40 ABDOMINAL AORTIC ANEURYSM (AAA) WITHOUT RUPTURE: ICD-10-CM

## 2020-01-14 DIAGNOSIS — I50.43 ACUTE ON CHRONIC COMBINED SYSTOLIC AND DIASTOLIC HEART FAILURE: ICD-10-CM

## 2020-01-14 DIAGNOSIS — I65.23 BILATERAL CAROTID ARTERY STENOSIS: ICD-10-CM

## 2020-01-14 DIAGNOSIS — N18.30 CKD (CHRONIC KIDNEY DISEASE) STAGE 3, GFR 30-59 ML/MIN: ICD-10-CM

## 2020-01-14 DIAGNOSIS — E66.9 OBESITY (BMI 30.0-34.9): ICD-10-CM

## 2020-01-14 DIAGNOSIS — I25.708 CORONARY ARTERY DISEASE OF BYPASS GRAFT OF NATIVE HEART WITH STABLE ANGINA PECTORIS: ICD-10-CM

## 2020-01-14 DIAGNOSIS — E78.2 MIXED HYPERLIPIDEMIA: ICD-10-CM

## 2020-01-14 DIAGNOSIS — E11.22 TYPE 2 DIABETES MELLITUS WITH STAGE 3 CHRONIC KIDNEY DISEASE, WITHOUT LONG-TERM CURRENT USE OF INSULIN: ICD-10-CM

## 2020-01-14 DIAGNOSIS — I10 ESSENTIAL HYPERTENSION: ICD-10-CM

## 2020-01-14 LAB — INR PPP: 2.8 (ref 2–3)

## 2020-01-14 PROCEDURE — 3074F SYST BP LT 130 MM HG: CPT | Mod: HCNC,CPTII,S$GLB, | Performed by: INTERNAL MEDICINE

## 2020-01-14 PROCEDURE — 1126F AMNT PAIN NOTED NONE PRSNT: CPT | Mod: HCNC,S$GLB,, | Performed by: INTERNAL MEDICINE

## 2020-01-14 PROCEDURE — 3078F PR MOST RECENT DIASTOLIC BLOOD PRESSURE < 80 MM HG: ICD-10-PCS | Mod: HCNC,CPTII,S$GLB, | Performed by: INTERNAL MEDICINE

## 2020-01-14 PROCEDURE — 1101F PR PT FALLS ASSESS DOC 0-1 FALLS W/OUT INJ PAST YR: ICD-10-PCS | Mod: HCNC,CPTII,S$GLB, | Performed by: INTERNAL MEDICINE

## 2020-01-14 PROCEDURE — 99205 OFFICE O/P NEW HI 60 MIN: CPT | Mod: HCNC,S$GLB,, | Performed by: INTERNAL MEDICINE

## 2020-01-14 PROCEDURE — 85610 PROTHROMBIN TIME: CPT | Mod: QW,HCNC,S$GLB, | Performed by: INTERNAL MEDICINE

## 2020-01-14 PROCEDURE — 3074F PR MOST RECENT SYSTOLIC BLOOD PRESSURE < 130 MM HG: ICD-10-PCS | Mod: HCNC,CPTII,S$GLB, | Performed by: INTERNAL MEDICINE

## 2020-01-14 PROCEDURE — 1101F PT FALLS ASSESS-DOCD LE1/YR: CPT | Mod: HCNC,CPTII,S$GLB, | Performed by: INTERNAL MEDICINE

## 2020-01-14 PROCEDURE — 85610 POCT INR: ICD-10-PCS | Mod: QW,HCNC,S$GLB, | Performed by: INTERNAL MEDICINE

## 2020-01-14 PROCEDURE — 99205 PR OFFICE/OUTPT VISIT, NEW, LEVL V, 60-74 MIN: ICD-10-PCS | Mod: HCNC,S$GLB,, | Performed by: INTERNAL MEDICINE

## 2020-01-14 PROCEDURE — 1159F PR MEDICATION LIST DOCUMENTED IN MEDICAL RECORD: ICD-10-PCS | Mod: HCNC,S$GLB,, | Performed by: INTERNAL MEDICINE

## 2020-01-14 PROCEDURE — 99499 UNLISTED E&M SERVICE: CPT | Mod: HCNC,S$GLB,, | Performed by: INTERNAL MEDICINE

## 2020-01-14 PROCEDURE — 93793 PR ANTICOAGULANT MGMT FOR PT TAKING WARFARIN: ICD-10-PCS | Mod: HCNC,S$GLB,, | Performed by: PHARMACIST

## 2020-01-14 PROCEDURE — 3078F DIAST BP <80 MM HG: CPT | Mod: HCNC,CPTII,S$GLB, | Performed by: INTERNAL MEDICINE

## 2020-01-14 PROCEDURE — 1126F PR PAIN SEVERITY QUANTIFIED, NO PAIN PRESENT: ICD-10-PCS | Mod: HCNC,S$GLB,, | Performed by: INTERNAL MEDICINE

## 2020-01-14 PROCEDURE — 93793 ANTICOAG MGMT PT WARFARIN: CPT | Mod: HCNC,S$GLB,, | Performed by: PHARMACIST

## 2020-01-14 PROCEDURE — 1159F MED LIST DOCD IN RCRD: CPT | Mod: HCNC,S$GLB,, | Performed by: INTERNAL MEDICINE

## 2020-01-14 PROCEDURE — 99999 PR PBB SHADOW E&M-EST. PATIENT-LVL IV: CPT | Mod: PBBFAC,HCNC,, | Performed by: INTERNAL MEDICINE

## 2020-01-14 PROCEDURE — 99999 PR PBB SHADOW E&M-EST. PATIENT-LVL IV: ICD-10-PCS | Mod: PBBFAC,HCNC,, | Performed by: INTERNAL MEDICINE

## 2020-01-14 PROCEDURE — 99499 RISK ADDL DX/OHS AUDIT: ICD-10-PCS | Mod: HCNC,S$GLB,, | Performed by: INTERNAL MEDICINE

## 2020-01-14 RX ORDER — BUMETANIDE 2 MG/1
TABLET ORAL
Qty: 60 TABLET | Refills: 11 | Status: SHIPPED | OUTPATIENT
Start: 2020-01-14 | End: 2020-01-31 | Stop reason: SDUPTHER

## 2020-01-14 RX ORDER — ROSUVASTATIN CALCIUM 40 MG/1
40 TABLET, COATED ORAL NIGHTLY
Qty: 90 TABLET | Refills: 3 | Status: SHIPPED | OUTPATIENT
Start: 2020-01-14 | End: 2021-02-18

## 2020-01-14 NOTE — LETTER
January 14, 2020      Devon Langston Jr., MD  [address]  [place] [address]           [facility] - Cardiology  [address]  [place] [address]  Phone: [phone]          Patient: Dariel Urbina   MR Number: [id number]   YOB: 1940   Date of Visit: 1/14/2020       Dear Dr. Devon Langston Jr.:    Thank you for referring Dariel Urbina to me for evaluation. Attached you will find relevant portions of my assessment and plan of care.    If you have questions, please do not hesitate to call me. I look forward to following Dariel Urbina along with you.    Sincerely,    Liborio Shirley III, MD    Enclosure  CC:  No Recipients    If you would like to receive this communication electronically, please contact [email or web address] or [phone] to request more information on [facility] access.    For providers and/or their staff who would like to refer a patient to [facility], please contact us through our one-stop-shop provider referral line, [facility], at [phone].    If you feel you have received this communication in error or would no longer like to receive these types of communications, please e-mail [email or web address]

## 2020-01-14 NOTE — PROGRESS NOTES
Patient reports he will be starting bumex. Otherwise, Patient denies any changes in diet, medications, or health that would effect warfarin therapy. Patient was re-educated on situations that would require placing a call to the coumadin clinic, including bleeding or unusual bruising issues, changes in health, diet or medications, upcoming procedures that require warfarin interruption, and missed coumadin dose(s). Patient expressed understanding that avoidance of consistency with these parameters could cause fluctuations in INR, leading to more frequent visits and increase risk of adverse events.

## 2020-01-21 ENCOUNTER — HOSPITAL ENCOUNTER (OUTPATIENT)
Dept: CARDIOLOGY | Facility: CLINIC | Age: 80
Discharge: HOME | End: 2020-01-21
Attending: INTERNAL MEDICINE
Payer: MEDICARE

## 2020-01-21 VITALS
DIASTOLIC BLOOD PRESSURE: 70 MMHG | HEIGHT: 66 IN | WEIGHT: 201 LBS | BODY MASS INDEX: 32.3 KG/M2 | HEART RATE: 60 BPM | SYSTOLIC BLOOD PRESSURE: 120 MMHG

## 2020-01-21 DIAGNOSIS — I10 ESSENTIAL HYPERTENSION: ICD-10-CM

## 2020-01-21 DIAGNOSIS — I73.9 PVD (PERIPHERAL VASCULAR DISEASE): ICD-10-CM

## 2020-01-21 DIAGNOSIS — E11.22 TYPE 2 DIABETES MELLITUS WITH STAGE 3 CHRONIC KIDNEY DISEASE, WITHOUT LONG-TERM CURRENT USE OF INSULIN: ICD-10-CM

## 2020-01-21 DIAGNOSIS — E66.9 OBESITY (BMI 30.0-34.9): ICD-10-CM

## 2020-01-21 DIAGNOSIS — I71.40 ABDOMINAL AORTIC ANEURYSM (AAA) WITHOUT RUPTURE: ICD-10-CM

## 2020-01-21 DIAGNOSIS — N18.30 CKD (CHRONIC KIDNEY DISEASE) STAGE 3, GFR 30-59 ML/MIN: ICD-10-CM

## 2020-01-21 DIAGNOSIS — Z95.2 H/O MECHANICAL AORTIC VALVE REPLACEMENT: ICD-10-CM

## 2020-01-21 DIAGNOSIS — I50.43 ACUTE ON CHRONIC COMBINED SYSTOLIC AND DIASTOLIC HEART FAILURE: ICD-10-CM

## 2020-01-21 DIAGNOSIS — I65.23 BILATERAL CAROTID ARTERY STENOSIS: ICD-10-CM

## 2020-01-21 DIAGNOSIS — I25.708 CORONARY ARTERY DISEASE OF BYPASS GRAFT OF NATIVE HEART WITH STABLE ANGINA PECTORIS: ICD-10-CM

## 2020-01-21 DIAGNOSIS — E78.2 MIXED HYPERLIPIDEMIA: ICD-10-CM

## 2020-01-21 DIAGNOSIS — N18.30 TYPE 2 DIABETES MELLITUS WITH STAGE 3 CHRONIC KIDNEY DISEASE, WITHOUT LONG-TERM CURRENT USE OF INSULIN: ICD-10-CM

## 2020-01-21 LAB
ASCENDING AORTA: 3.24 CM
AV INDEX (PROSTH): 0.34
AV MEAN GRADIENT: 12 MMHG
AV PEAK GRADIENT: 28 MMHG
AV VALVE AREA: 1.04 CM2
AV VELOCITY RATIO: 0.3
BSA FOR ECHO PROCEDURE: 2.06 M2
CV ECHO LV RWT: 0.37 CM
DOP CALC AO PEAK VEL: 2.64 M/S
DOP CALC AO VTI: 51.36 CM
DOP CALC LVOT AREA: 3.1 CM2
DOP CALC LVOT DIAMETER: 1.99 CM
DOP CALC LVOT PEAK VEL: 0.8 M/S
DOP CALC LVOT STROKE VOLUME: 53.59 CM3
DOP CALCLVOT PEAK VEL VTI: 17.24 CM
E WAVE DECELERATION TIME: 237.22 MSEC
E/A RATIO: 0.63
E/E' RATIO: 12 M/S
ECHO LV POSTERIOR WALL: 1 CM (ref 0.6–1.1)
FRACTIONAL SHORTENING: 24 % (ref 28–44)
INTERVENTRICULAR SEPTUM: 1.2 CM (ref 0.6–1.1)
IVRT: 0.11 MSEC
LA MAJOR: 6.6 CM
LA MINOR: 6.55 CM
LA WIDTH: 5.56 CM
LEFT ATRIUM SIZE: 5.36 CM
LEFT ATRIUM VOLUME INDEX: 83.1 ML/M2
LEFT ATRIUM VOLUME: 166.55 CM3
LEFT INTERNAL DIMENSION IN SYSTOLE: 4.11 CM (ref 2.1–4)
LEFT VENTRICLE DIASTOLIC VOLUME INDEX: 70.52 ML/M2
LEFT VENTRICLE DIASTOLIC VOLUME: 141.34 ML
LEFT VENTRICLE MASS INDEX: 117 G/M2
LEFT VENTRICLE SYSTOLIC VOLUME INDEX: 37.4 ML/M2
LEFT VENTRICLE SYSTOLIC VOLUME: 74.87 ML
LEFT VENTRICULAR INTERNAL DIMENSION IN DIASTOLE: 5.4 CM (ref 3.5–6)
LEFT VENTRICULAR MASS: 234.82 G
LV LATERAL E/E' RATIO: 10 M/S
LV SEPTAL E/E' RATIO: 15 M/S
MV PEAK A VEL: 0.95 M/S
MV PEAK E VEL: 0.6 M/S
PISA TR MAX VEL: 2.83 M/S
PULM VEIN S/D RATIO: 0.74
PV PEAK D VEL: 0.35 M/S
PV PEAK S VEL: 0.26 M/S
RA MAJOR: 5.38 CM
RA PRESSURE: 3 MMHG
RA WIDTH: 4.43 CM
RIGHT VENTRICULAR END-DIASTOLIC DIMENSION: 4.3 CM
RV TISSUE DOPPLER FREE WALL SYSTOLIC VELOCITY 1 (APICAL 4 CHAMBER VIEW): 8.18 CM/S
SINUS: 3.57 CM
STJ: 2.98 CM
TDI LATERAL: 0.06 M/S
TDI SEPTAL: 0.04 M/S
TDI: 0.05 M/S
TR MAX PG: 32 MMHG
TRICUSPID ANNULAR PLANE SYSTOLIC EXCURSION: 1.62 CM
TV REST PULMONARY ARTERY PRESSURE: 35 MMHG

## 2020-01-21 PROCEDURE — 93306 TTE W/DOPPLER COMPLETE: CPT | Mod: 26,HCNC,, | Performed by: INTERNAL MEDICINE

## 2020-01-21 PROCEDURE — 93306 TTE W/DOPPLER COMPLETE: CPT | Mod: HCNC

## 2020-01-21 PROCEDURE — 93306 ECHO (CUPID ONLY): ICD-10-PCS | Mod: 26,HCNC,, | Performed by: INTERNAL MEDICINE

## 2020-01-23 ENCOUNTER — TELEPHONE (OUTPATIENT)
Dept: CARDIOLOGY | Facility: CLINIC | Age: 80
End: 2020-01-23

## 2020-01-23 NOTE — TELEPHONE ENCOUNTER
Labs and echo reviewed with Mr Wallace. Edema resolved. Feeling better. Bumex decreased to once daily. Has follow up with me in 2 weeks.

## 2020-01-31 ENCOUNTER — TELEPHONE (OUTPATIENT)
Dept: CARDIOLOGY | Facility: CLINIC | Age: 80
End: 2020-01-31

## 2020-01-31 RX ORDER — BUMETANIDE 2 MG/1
TABLET ORAL
Qty: 180 TABLET | Refills: 3 | Status: SHIPPED | OUTPATIENT
Start: 2020-01-31 | End: 2021-08-19 | Stop reason: SDUPTHER

## 2020-01-31 NOTE — TELEPHONE ENCOUNTER
"C/o of returning of LE edema, states not as bad like at last visit but "getting there". Denied shortness of breath. Bumex decreased to once a day from twice daily. Would like to know if can go back to twice daily. Upcoming OV 2/21/20. Message routed to Dr. Shirley for review.   "

## 2020-01-31 NOTE — TELEPHONE ENCOUNTER
Patient advised as per Dr. Shirley: should resume twice daily bumex. When the swelling has resolved again, he should return to the bumex once daily with an extra dose for 3lb weight gain in 1 day or 5lb in 1 week in order to prevent this recurrence. He needs to make sure he is eating a low sodium diet.  Patient verbalized understanding

## 2020-01-31 NOTE — TELEPHONE ENCOUNTER
----- Message from Carol Foreman sent at 1/31/2020 10:51 AM CST -----  Contact: Ameena pt wife [phone]  Pt wife would like a call stating he's beginning to retain fluid again. She wants to know if the doctor would like him to go back to taking 2 pills daily.  LOV 1/14/20 Dr. Shirley    Thanks

## 2020-02-17 ENCOUNTER — PES CALL (OUTPATIENT)
Dept: ADMINISTRATIVE | Facility: CLINIC | Age: 80
End: 2020-02-17

## 2020-02-18 ENCOUNTER — PES CALL (OUTPATIENT)
Dept: ADMINISTRATIVE | Facility: CLINIC | Age: 80
End: 2020-02-18

## 2020-02-18 NOTE — PROGRESS NOTES
Cardiology Clinic Note  Reason for Visit: CAD s/p CABG, HF    HPI:     Dariel Urbina is a 79 y.o. M, who for follow up of HF symptoms.    He reports that his swelling, orthopnea, and PND have improved since our last visit, since changing lasix to bumex. He continues to be very short of breath with exertion. He is unsure of his symptoms prior to CABG. His baseline home weight is 190-193lb. His lowest was 189lb while on bumex 2mg BID.    To note, he had a prior PET stress in 3/2018 with ischemia in the inferolateral wall from base to distal. OM2 graft not found of [facility] 3/2018.    Medical: CAD s/p CABG (3/27/18; LIMA-LAD, SVG-OM2 [likely occluded]), porcelain aorta, mechanical aortic valve (~1992/93), suprarenal AAA, bilateral carotid artery stenosis, DM, HLD, CKD III, chronic nose bleeds s/p cautery with ENT  Surgical: CABG, hernia, spine, vasectomy, aortic valve replacement  Family: mother, father, and brother with heart disease/heart failure   Social: previously smoked 1 PPD x20y (quit 1980), no alcohol use    ROS:    Constitution: Negative for fever or chills.  HENT: Negative for  headaches.  Eyes: Negative for blurred vision.   Cardiovascular: See above  Pulmonary: Positive for SOB. Negative for cough.   Gastrointestinal: Negative for nausea/vomiting.   : Negative for dysuria.   Skin: Negative for rashes.  Neurological: Negative for focal weakness.  Psychological: Negative for depression.  PMH:     Past Medical History:   Diagnosis Date    Anemia of chronic renal failure, stage 3 (moderate) 5/27/2015    Atherosclerosis of coronary artery bypass graft of native heart without angina pectoris 9/11/2012    3-27-18 [facility] Two vessel coronary artery disease.   Prosthetic aortic valve.   Porcelain aorta.   Patent LIMA graft.    Bilateral carotid artery disease 2/9/2017    Bleeding from the nose     Bleeding nose 3/21/2018    Cataract     CKD (chronic kidney disease) stage 3, GFR 30-59 ml/min 5/27/2015     Claudication of left lower extremity 9/17/2014    Colon polyp     Gastroesophageal reflux disease without esophagitis 3/19/2018    Gastrointestinal hemorrhage associated with intestinal diverticulosis 4/1/2018    H/O mechanical aortic valve replacement 9/17/2014    History of gout 9/26/2012    Hyperparathyroidism due to renal insufficiency 7/27/2015    Internal hemorrhoid 4/3/2018    Long term current use of anticoagulant therapy 9/26/2012    Mechanical heart valve present     Metabolic acidosis with normal anion gap and bicarbonate losses 3/20/2018    Mixed hyperlipidemia 9/26/2012    NSTEMI (non-ST elevated myocardial infarction) 3/21/2018    Obesity, diabetes, and hypertension syndrome 2/23/2016    PVD (peripheral vascular disease) 9/11/2012    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/2012    Type 2 diabetes mellitus with diabetic peripheral angiopathy without gangrene 5/27/2015    Type 2 diabetes mellitus with stage 3 chronic kidney disease, without long-term current use of insulin 10/2/2013     Past Surgical History:   Procedure Laterality Date    CARDIAC CATHETERIZATION      CARDIAC VALVE REPLACEMENT      CARDIAC VALVE SURGERY      COLON SURGERY      COLONOSCOPY N/A 3/31/2017    Procedure: COLONOSCOPY;  Surgeon: Bruno Raymond MD;  Location: [facility] (4TH FLR);  Service: Endoscopy;  Laterality: N/A;  Patient's wife requesting date.    COLONOSCOPY N/A 4/3/2018    Procedure: COLONOSCOPY;  Surgeon: Bonifacio Pelletier MD;  Location: [facility] (2ND FLR);  Service: Endoscopy;  Laterality: N/A;    COLONOSCOPY N/A 8/13/2018    Procedure: COLONOSCOPY;  Surgeon: Kam Barba MD;  Location: [facility] (2ND FLR);  Service: Endoscopy;  Laterality: N/A;  2nd floor: PA pressure 49; hx of moderate-severe valve disease     per Coumadin clinic-Patient can hold 5 days with lovenox bridge       ok to schedule per Katarina    CORONARY ANGIOPLASTY      CORONARY ARTERY BYPASS GRAFT      HERNIA REPAIR      SPINE  SURGERY      VASECTOMY       Allergies:     Review of patient's allergies indicates:   Allergen Reactions    Fosinopril      Intolerance- elevates potassium level      Losartan      Intolerance- elevates potassium level     Medications:     Current Outpatient Medications on File Prior to Visit   Medication Sig Dispense Refill    allopurinol (ZYLOPRIM) 100 MG tablet TAKE 1 TABLET EVERY DAY 90 tablet 3    aspirin (ECOTRIN) 81 MG EC tablet Take 1 tablet (81 mg total) by mouth once daily.  0    bumetanide (BUMEX) 2 MG tablet Take 2mg Once to twice daily or as directed 180 tablet 3    carvedilol (COREG) 12.5 MG tablet Take 1 tablet (12.5 mg total) by mouth 2 (two) times daily. 180 tablet 4    fenofibrate micronized (LOFIBRA) 200 MG Cap Take 200 mg by mouth daily with breakfast.      glimepiride (AMARYL) 1 MG tablet Take 1 tablet (1 mg total) by mouth every morning. 90 tablet 3    isosorbide mononitrate (IMDUR) 120 MG 24 hr tablet TAKE 1 TABLET EVERY DAY 90 tablet 4    NIFEdipine (PROCARDIA-XL) 60 MG (OSM) 24 hr tablet Take 1 tablet (60 mg total) by mouth once daily. 90 tablet 4    nitroGLYCERIN (NITROSTAT) 0.4 MG SL tablet Place 1 tablet (0.4 mg total) under the tongue every 5 (five) minutes as needed. As needed for chest pain 90 tablet 4    omega-3 acid ethyl esters (LOVAZA) 1 gram capsule Take 2 capsules (2 g total) by mouth 2 (two) times daily. 360 capsule 5    ranitidine (ZANTAC) 300 MG tablet       rosuvastatin (CRESTOR) 40 MG Tab Take 1 tablet (40 mg total) by mouth every evening. 90 tablet 3    warfarin (COUMADIN) 5 MG tablet TAKE 1 & 1/2 TO 2 TABLETS (7.5 TO 10 MG TOTAL) BY  MOUTH DAILY AS DIRECTED BY COUMADIN CLINIC 180 tablet 3     No current facility-administered medications on file prior to visit.      Social History:     Social History     Tobacco Use    Smoking status: Former Smoker     Packs/day: 1.00     Years: 20.00     Pack years: 20.00     Last attempt to quit: 9/11/1980     Years  "since quittin.4    Smokeless tobacco: Never Used   Substance Use Topics    Alcohol use: No     Family History:     Family History   Problem Relation Age of Onset    Heart failure Mother     Heart disease Mother     Heart failure Father     Heart disease Father     Alcohol abuse Father     Heart failure Brother     Heart disease Brother     Diabetes Brother     No Known Problems Sister     No Known Problems Maternal Grandmother     No Known Problems Maternal Grandfather     No Known Problems Paternal Grandmother     No Known Problems Paternal Grandfather     Heart disease Sister     No Known Problems Maternal Aunt     No Known Problems Maternal Uncle     No Known Problems Paternal Aunt     No Known Problems Paternal Uncle     Amblyopia Neg Hx     Blindness Neg Hx     Cancer Neg Hx     Cataracts Neg Hx     Glaucoma Neg Hx     Hypertension Neg Hx     Macular degeneration Neg Hx     Retinal detachment Neg Hx     Strabismus Neg Hx     Stroke Neg Hx     Thyroid disease Neg Hx     Anemia Neg Hx     Arrhythmia Neg Hx     Asthma Neg Hx     Clotting disorder Neg Hx     Fainting Neg Hx     Heart attack Neg Hx     Hyperlipidemia Neg Hx     Atrial Septal Defect Neg Hx     Melanoma Neg Hx      Physical Exam:   /60   Pulse (!) 54   Ht 5' 6" (1.676 m)   Wt 88.6 kg (195 lb 5.2 oz)   SpO2 96%   BMI 31.53 kg/m²      Constitutional: No apparent distress, conversant  HEENT: Sclera anicteric, extraocular movements intact  Neck: No JVD, no carotid bruits  CV: Regular rate and rhythm, 2/6 systolic murmur at RUSB with valve clicks, 1/6 holosystolic murmur at LLSB, 2/6 holosystolic murmur at apex  Pulm: Clear to auscultation  GI: Abdomen soft, no palpable masses  Extremities: 1+ pitting bilateral lower extremity edema, warm with palpable pulses  Skin: No ecchymosis, erythema, or ulcers  Psych: Alert and oriented to person place location, appropriate affect  Neuro: No focal " deficits    Labs:     Blood Tests:  Lab Results   Component Value Date     (H) 01/06/2020     01/21/2020    K 4.6 01/21/2020    CL 99 01/21/2020    CO2 31 (H) 01/21/2020    BUN 54 (H) 01/21/2020    CREATININE 1.9 (H) 01/21/2020     (H) 01/21/2020    HGBA1C 5.5 07/17/2019    MG 1.8 11/19/2019    AST 25 01/21/2020    ALT 23 01/21/2020    ALBUMIN 4.2 01/21/2020    PROT 8.5 (H) 01/21/2020    BILITOT 0.5 01/21/2020    WBC 5.69 01/06/2020    HGB 11.9 (L) 01/06/2020    HCT 35.4 (L) 01/06/2020    MCV 92 01/06/2020     (L) 01/06/2020    INR 2.8 01/14/2020    INR 1.9 (H) 08/20/2018    INR 2.0 (H) 03/15/2010    PSA 0.35 07/27/2012    TSH 3.029 02/02/2017       Lab Results   Component Value Date    CHOL 194 07/17/2019    HDL 40 07/17/2019    TRIG 277 (H) 07/17/2019       Lab Results   Component Value Date    LDLCALC 98.6 07/17/2019       Urine Tests:  Lab Results   Component Value Date    COLORU Yellow 11/19/2019    APPEARANCEUA Clear 11/19/2019    PHUR 5.0 11/19/2019    SPECGRAV 1.015 11/19/2019    PROTEINUA 2+ (A) 11/19/2019    GLUCUA Negative 11/19/2019    KETONESU Negative 11/19/2019    BILIRUBINUA Negative 11/19/2019    OCCULTUA Negative 11/19/2019    NITRITE Negative 11/19/2019    UROBILINOGEN Negative 04/01/2018    LEUKOCYTESUR Negative 11/19/2019    PROTEINURINE 126 (H) 11/19/2019    PROTEINURINE 8 03/21/2018    CREATRANDUR 102.0 11/19/2019    UTPCR 1.24 (H) 11/19/2019       Imaging:     Echocardiogram  TTE 4/26/18  CONCLUSIONS     1 - Mild left ventricular enlargement.     2 - Low normal to mildly depressed left ventricular systolic function (EF 50-55%).     3 - Eccentric hypertrophy.     4 - Wall motion abnormalities.     5 - Impaired LV relaxation, elevated LAP (grade 2 diastolic dysfunction).     6 - Biatrial enlargement.     7 - Right ventricular enlargement with low normal systolic function.     8 - Aortic valve prosthesis, MATIAS = 0.96 cm2, AVAi = 0.5 cm2/m2, peak velocity = 2.62 m/s,  mean gradient = 17 mmHg.     9 - Trivial aortic regurgitation.     10 - Moderate to moderate-severe (2-3+) mitral regurgitation.     11 - Moderate tricuspid regurgitation.     12 - Mild pulmonic regurgitation.     13 - Pulmonary hypertension. The estimated PA systolic pressure is 49 mmHg.      TTE 3/21/18  CONCLUSIONS     1 - Limited portable study.     2 - Low normal to mildly depressed left ventricular systolic function (EF 50-55%).     3 - Normal right ventricular systolic function .     4 - Normal left ventricular diastolic function.     5 - Mild mitral regurgitation.     6 - Aortic valve mechanical prosthesis - no Doppler interrogation performed.     7 - Consider full color Doppler study if clinically indicated.     TTE 5/3/17  CONCLUSIONS     1 - Eccentric LVH with normal left ventricular systolic function (EF 60-65%).     2 - Severe left atrial enlargement.     3 - Normal right ventricular systolic function .     4 - Aortic valve mechanical prosthesis with a mean gradient of 17mmHg.     5 - Moderate to severe mitral regurgitation.     6 - Moderate tricuspid regurgitation.     7 - Pulmonary hypertension. The estimated PA systolic pressure is 54 mmHg.     Stress testing  PET 3/23/18  CONCLUSIONS: ABNORMAL MYOCARDIAL PERFUSION PET STRESS TEST  1. There is a moderate sized mild to moderate ischemia in the base to distal inferolateral wall. This defect comprises 15 % of the left ventricular myocardium.   2. Resting wall motion is physiologic. Stress wall motion is physiologic.   3. LV function is normal at rest and stress.  (normal is >= 51%)  4. The ventricular volumes are normal at rest and stress.   5. The extracardiac distribution of radioactivity is normal.   6. When compared to the previous study from 12/8/16, there is no signficant change.    PET 12/8/16  CONCLUSIONS: ABNORMAL MYOCARDIAL PERFUSION PET STRESS TEST  1. There is a small sized mild ischemia in the distal inferolateral wall in the usual  distribution of the distal Left Circumflex Artery. This defect comprises 10 % of the left ventricular myocardium.   2. Resting wall motion is physiologic. Stress wall motion is physiologic.   3. Visually estimated LV function is normal at rest and normal at stress.   4. The ventricular volumes are normal at rest and stress.   5. The extracardiac distribution of radioactivity is normal.   6. There was no previous study available to compare.    Cath Lab  [facility] 3/27/18  Diagnostic:      Patient has a right dominant coronary artery.      - Left Main Coronary Artery:           The distal LM has a 75% stenosis. There is JAMILAH 3 flow.     - Left Anterior Descending Artery:           The mid LAD has chronic total occlusion. There is JAMILAH 0 flow.     - Left Circumflex Artery:           The LCX is diffusely diseased (75). There is JAMILAH 3 flow.     - Right Coronary Artery:           The RCA has luminal irregularities. There is JAMILAH 3 flow.     - CAMARGO To LAD:           The LIMA to LAD is normal. There is JAMILAH 3 flow.     - SVG To OM2:           The SVG to OM2 was not found.     - Radial:           The radial was not studied.    Other  Abdominal aorta 5/10/18  CONCLUSIONS   There is an Abdominal Aortic Aneurysm largest at the Suprarenal level measuring 3.32 cm.  There is an accelerated PSV at the left common iliac level of 310 cm/s  indicating a possible stenosis.    Abdominal aorta 17  CONCLUSIONS   There is an Abdominal Aortic Aneurysm largest at the Suprarenal level measuring 3.35 cm.  There is an accelerated PSV at the left common iliac level of 317 cm/s  indicating a possible stenosis.    EK/1/18  Sinus bradycardia with 1st degree A-V block with occasional Premature ventricular complexes  Left atrial abnormality/enlargement  Left bundle branch block    Assessment:     1. Shortness of breath    2. H/O mechanical aortic valve replacement    3. Coronary artery disease of bypass graft of native heart with stable angina  pectoris    4. Mixed hyperlipidemia    5. Renovascular hypertension    6. Abdominal aortic aneurysm (AAA) without rupture    7. Bilateral carotid artery stenosis    8. PVD (peripheral vascular disease)    9. CKD (chronic kidney disease) stage 3, GFR 30-59 ml/min    10. Long term current use of anticoagulant therapy    11. Type 2 diabetes mellitus with diabetic peripheral angiopathy without gangrene, without long-term current use of insulin    12. Obesity (BMI 30.0-34.9)        Plan:     Shortness of breath  Multiple possible etiologies - CAD with 15% ischemia on PET, h/o MVR, mod/sev MR, CHF  Suspect valvular heart disease is most likely the etiology of his current symptoms.   Obtain echo    Chronic heart failure with mid range EF  Baseline home weight 190-193lb. At his baseline today.  Continue bumex 2mg daily with extra dose prn.  Continue coreg 12.5mg BID    Coronary artery disease of bypass graft of native heart with stable angina pectoris  Continue ASA, rosuvastatin 40mg daily, coreg 12.5mg BID, nifedipine 60mg daily, ISMN 120mg daily  Has 15% ischemia in territory of occluded OM2 graft. Suspect this is not the sole etiology of dyspnea.    H/O mechanical aortic valve replacement  Echo, as above  Continue warfarin for anticoagulation    Bilateral carotid artery stenosis  Mixed hyperlipidemia  PVD (peripheral vascular disease)  Abdominal aortic aneurysm (AAA) without rupture  Stable  Continue ASA, statin    Essential hypertension  CKD (chronic kidney disease) stage 3, GFR 30-59 ml/min  BP mildly elevated in clinic today  Continue current medications    Instructed to keep BP log for 7-10 days and call with values for my review    Baseline Cr 1.7 (1/2020)     Obesity (BMI 30.0-34.9)  Type 2 diabetes mellitus with stage 3 chronic kidney disease, without long-term current use of insulin  Followed by PCP    Signed:  Yuriy Shirley MD  Cardiology     3/2/2020 8:50 AM    Follow-up:     Future Appointments   Date Time  Provider Department [place]   2/21/2020  8:45 AM LAB, COUMADIN 2 [facility] COUMAD [facility]   3/2/2020  8:30 AM Liborio Shirley III, MD [facility] CARDIO [place]   3/18/2020  9:00 AM VASCULAR, CARDIOLOGY [facility] VASCARD [facility]   3/18/2020 11:00 AM Chantel Wilder PA-C [facility] CARDVAL [facility]   4/17/2020  9:30 AM Ruthie Romo NP [address]

## 2020-02-21 ENCOUNTER — ANTI-COAG VISIT (OUTPATIENT)
Dept: CARDIOLOGY | Facility: CLINIC | Age: 80
End: 2020-02-21
Payer: MEDICARE

## 2020-02-21 DIAGNOSIS — Z95.2 H/O MECHANICAL AORTIC VALVE REPLACEMENT: ICD-10-CM

## 2020-02-21 DIAGNOSIS — Z79.01 LONG TERM CURRENT USE OF ANTICOAGULANT THERAPY: Primary | ICD-10-CM

## 2020-02-21 LAB — INR PPP: 1.6 (ref 2–3)

## 2020-02-21 PROCEDURE — 85610 PROTHROMBIN TIME: CPT | Mod: QW,HCNC,S$GLB, | Performed by: INTERNAL MEDICINE

## 2020-02-21 PROCEDURE — 93793 PR ANTICOAGULANT MGMT FOR PT TAKING WARFARIN: ICD-10-PCS | Mod: HCNC,S$GLB,,

## 2020-02-21 PROCEDURE — 93793 ANTICOAG MGMT PT WARFARIN: CPT | Mod: HCNC,S$GLB,,

## 2020-02-21 PROCEDURE — 85610 POCT INR: ICD-10-PCS | Mod: QW,HCNC,S$GLB, | Performed by: INTERNAL MEDICINE

## 2020-02-21 NOTE — PROGRESS NOTES
INR not at goal. Medications, chart, and patient findings reviewed. See calendar for adjustments to dose and follow up plan.  Findings: Pt states he missed his coumadin dose last Sunday; he has also been eating a handful of chocolate covered almonds daily & denies any other changes. Pt's subtherapeutic lab likely due to change in diet or missed dosing.  Will instruct pt to take 15mg 2/21 & resume maintenance dose.  Plan to re-assess in 2 weeks.  Pt to resume normal diet.   Patient was re-educated on situations that would require placing a call to the Coumadin Clinic, including bleeding or unusual bruising issues, changes in health, diet or medications,upcoming procedures that require warfarin interruption, and missed Coumadin dose(s). Patient expressed understanding that avoidance of consistency with these parameters could cause fluctuations in INR, leading to more frequent visits and increase risk of adverse events.

## 2020-02-29 ENCOUNTER — PATIENT OUTREACH (OUTPATIENT)
Dept: ADMINISTRATIVE | Facility: OTHER | Age: 80
End: 2020-02-29

## 2020-03-02 ENCOUNTER — LAB VISIT (OUTPATIENT)
Dept: LAB | Facility: HOSPITAL | Age: 80
End: 2020-03-02
Attending: INTERNAL MEDICINE
Payer: MEDICARE

## 2020-03-02 ENCOUNTER — OFFICE VISIT (OUTPATIENT)
Dept: CARDIOLOGY | Facility: CLINIC | Age: 80
End: 2020-03-02
Payer: MEDICARE

## 2020-03-02 ENCOUNTER — ANTI-COAG VISIT (OUTPATIENT)
Dept: CARDIOLOGY | Facility: CLINIC | Age: 80
End: 2020-03-02
Payer: MEDICARE

## 2020-03-02 VITALS
BODY MASS INDEX: 31.39 KG/M2 | SYSTOLIC BLOOD PRESSURE: 136 MMHG | OXYGEN SATURATION: 96 % | WEIGHT: 195.31 LBS | HEIGHT: 66 IN | HEART RATE: 54 BPM | DIASTOLIC BLOOD PRESSURE: 60 MMHG

## 2020-03-02 DIAGNOSIS — Z95.2 H/O MECHANICAL AORTIC VALVE REPLACEMENT: ICD-10-CM

## 2020-03-02 DIAGNOSIS — N18.30 CKD (CHRONIC KIDNEY DISEASE) STAGE 3, GFR 30-59 ML/MIN: ICD-10-CM

## 2020-03-02 DIAGNOSIS — I15.0 RENOVASCULAR HYPERTENSION: ICD-10-CM

## 2020-03-02 DIAGNOSIS — Z79.01 LONG TERM CURRENT USE OF ANTICOAGULANT THERAPY: ICD-10-CM

## 2020-03-02 DIAGNOSIS — E66.9 OBESITY (BMI 30.0-34.9): ICD-10-CM

## 2020-03-02 DIAGNOSIS — E78.2 MIXED HYPERLIPIDEMIA: ICD-10-CM

## 2020-03-02 DIAGNOSIS — R06.02 SHORTNESS OF BREATH: Primary | ICD-10-CM

## 2020-03-02 DIAGNOSIS — I25.708 CORONARY ARTERY DISEASE OF BYPASS GRAFT OF NATIVE HEART WITH STABLE ANGINA PECTORIS: ICD-10-CM

## 2020-03-02 DIAGNOSIS — I71.40 ABDOMINAL AORTIC ANEURYSM (AAA) WITHOUT RUPTURE: ICD-10-CM

## 2020-03-02 DIAGNOSIS — I65.23 BILATERAL CAROTID ARTERY STENOSIS: ICD-10-CM

## 2020-03-02 DIAGNOSIS — I73.9 PVD (PERIPHERAL VASCULAR DISEASE): ICD-10-CM

## 2020-03-02 DIAGNOSIS — E11.51 TYPE 2 DIABETES MELLITUS WITH DIABETIC PERIPHERAL ANGIOPATHY WITHOUT GANGRENE, WITHOUT LONG-TERM CURRENT USE OF INSULIN: ICD-10-CM

## 2020-03-02 LAB
INR PPP: 2.7 (ref 0.8–1.2)
PROTHROMBIN TIME: 25.2 SEC (ref 9–12.5)

## 2020-03-02 PROCEDURE — 99999 PR PBB SHADOW E&M-EST. PATIENT-LVL IV: CPT | Mod: PBBFAC,HCNC,, | Performed by: INTERNAL MEDICINE

## 2020-03-02 PROCEDURE — 1126F AMNT PAIN NOTED NONE PRSNT: CPT | Mod: HCNC,S$GLB,, | Performed by: INTERNAL MEDICINE

## 2020-03-02 PROCEDURE — 99214 OFFICE O/P EST MOD 30 MIN: CPT | Mod: HCNC,S$GLB,, | Performed by: INTERNAL MEDICINE

## 2020-03-02 PROCEDURE — 1101F PR PT FALLS ASSESS DOC 0-1 FALLS W/OUT INJ PAST YR: ICD-10-PCS | Mod: HCNC,CPTII,S$GLB, | Performed by: INTERNAL MEDICINE

## 2020-03-02 PROCEDURE — 1126F PR PAIN SEVERITY QUANTIFIED, NO PAIN PRESENT: ICD-10-PCS | Mod: HCNC,S$GLB,, | Performed by: INTERNAL MEDICINE

## 2020-03-02 PROCEDURE — 3075F SYST BP GE 130 - 139MM HG: CPT | Mod: HCNC,CPTII,S$GLB, | Performed by: INTERNAL MEDICINE

## 2020-03-02 PROCEDURE — 85610 PROTHROMBIN TIME: CPT | Mod: HCNC

## 2020-03-02 PROCEDURE — 93793 PR ANTICOAGULANT MGMT FOR PT TAKING WARFARIN: ICD-10-PCS | Mod: S$GLB,,,

## 2020-03-02 PROCEDURE — 93793 ANTICOAG MGMT PT WARFARIN: CPT | Mod: S$GLB,,,

## 2020-03-02 PROCEDURE — 3078F PR MOST RECENT DIASTOLIC BLOOD PRESSURE < 80 MM HG: ICD-10-PCS | Mod: HCNC,CPTII,S$GLB, | Performed by: INTERNAL MEDICINE

## 2020-03-02 PROCEDURE — 1159F MED LIST DOCD IN RCRD: CPT | Mod: HCNC,S$GLB,, | Performed by: INTERNAL MEDICINE

## 2020-03-02 PROCEDURE — 1159F PR MEDICATION LIST DOCUMENTED IN MEDICAL RECORD: ICD-10-PCS | Mod: HCNC,S$GLB,, | Performed by: INTERNAL MEDICINE

## 2020-03-02 PROCEDURE — 99214 PR OFFICE/OUTPT VISIT, EST, LEVL IV, 30-39 MIN: ICD-10-PCS | Mod: HCNC,S$GLB,, | Performed by: INTERNAL MEDICINE

## 2020-03-02 PROCEDURE — 1101F PT FALLS ASSESS-DOCD LE1/YR: CPT | Mod: HCNC,CPTII,S$GLB, | Performed by: INTERNAL MEDICINE

## 2020-03-02 PROCEDURE — 3078F DIAST BP <80 MM HG: CPT | Mod: HCNC,CPTII,S$GLB, | Performed by: INTERNAL MEDICINE

## 2020-03-02 PROCEDURE — 3075F PR MOST RECENT SYSTOLIC BLOOD PRESS GE 130-139MM HG: ICD-10-PCS | Mod: HCNC,CPTII,S$GLB, | Performed by: INTERNAL MEDICINE

## 2020-03-02 PROCEDURE — 36415 COLL VENOUS BLD VENIPUNCTURE: CPT | Mod: HCNC,PO

## 2020-03-02 PROCEDURE — 99999 PR PBB SHADOW E&M-EST. PATIENT-LVL IV: ICD-10-PCS | Mod: PBBFAC,HCNC,, | Performed by: INTERNAL MEDICINE

## 2020-03-06 ENCOUNTER — TELEPHONE (OUTPATIENT)
Dept: CARDIOLOGY | Facility: CLINIC | Age: 80
End: 2020-03-06

## 2020-03-06 ENCOUNTER — HOSPITAL ENCOUNTER (OUTPATIENT)
Dept: CARDIOLOGY | Facility: CLINIC | Age: 80
Discharge: HOME | End: 2020-03-06
Attending: INTERNAL MEDICINE
Payer: MEDICARE

## 2020-03-06 VITALS
DIASTOLIC BLOOD PRESSURE: 60 MMHG | SYSTOLIC BLOOD PRESSURE: 136 MMHG | BODY MASS INDEX: 31.34 KG/M2 | HEART RATE: 51 BPM | WEIGHT: 195 LBS | HEIGHT: 66 IN

## 2020-03-06 DIAGNOSIS — Z95.2 H/O MECHANICAL AORTIC VALVE REPLACEMENT: ICD-10-CM

## 2020-03-06 LAB
ASCENDING AORTA: 3.33 CM
AV INDEX (PROSTH): 0.27
AV MEAN GRADIENT: 23 MMHG
AV PEAK GRADIENT: 38 MMHG
AV VALVE AREA: 0.84 CM2
AV VELOCITY RATIO: 0.24
BSA FOR ECHO PROCEDURE: 2.03 M2
CV ECHO LV RWT: 0.37 CM
DOP CALC AO PEAK VEL: 3.1 M/S
DOP CALC AO VTI: 73.34 CM
DOP CALC LVOT AREA: 3.1 CM2
DOP CALC LVOT DIAMETER: 2 CM
DOP CALC LVOT PEAK VEL: 0.75 M/S
DOP CALC LVOT STROKE VOLUME: 61.45 CM3
DOP CALCLVOT PEAK VEL VTI: 19.57 CM
E WAVE DECELERATION TIME: 179.3 MSEC
E/A RATIO: 1.19
E/E' RATIO: 17.47 M/S
ECHO LV POSTERIOR WALL: 1 CM (ref 0.6–1.1)
FRACTIONAL SHORTENING: 25 % (ref 28–44)
INTERVENTRICULAR SEPTUM: 1 CM (ref 0.6–1.1)
IVRT: 57.09 MSEC
LA MAJOR: 6.39 CM
LA MINOR: 6.56 CM
LA WIDTH: 5.43 CM
LEFT ATRIUM SIZE: 5.6 CM
LEFT ATRIUM VOLUME INDEX: 84.6 ML/M2
LEFT ATRIUM VOLUME: 167.33 CM3
LEFT INTERNAL DIMENSION IN SYSTOLE: 4.05 CM (ref 2.1–4)
LEFT VENTRICLE DIASTOLIC VOLUME INDEX: 70.33 ML/M2
LEFT VENTRICLE DIASTOLIC VOLUME: 139.17 ML
LEFT VENTRICLE MASS INDEX: 104 G/M2
LEFT VENTRICLE SYSTOLIC VOLUME INDEX: 36.3 ML/M2
LEFT VENTRICLE SYSTOLIC VOLUME: 71.9 ML
LEFT VENTRICULAR INTERNAL DIMENSION IN DIASTOLE: 5.4 CM (ref 3.5–6)
LEFT VENTRICULAR MASS: 206.74 G
LV LATERAL E/E' RATIO: 13.1 M/S
LV SEPTAL E/E' RATIO: 26.2 M/S
MV PEAK A VEL: 1.1 M/S
MV PEAK E VEL: 1.31 M/S
PISA TR MAX VEL: 3.41 M/S
PULM VEIN S/D RATIO: 0.73
PV PEAK D VEL: 0.59 M/S
PV PEAK S VEL: 0.43 M/S
RA MAJOR: 5.76 CM
RA PRESSURE: 3 MMHG
RA WIDTH: 3.58 CM
RETIRED EF AND QEF - SEE NOTES: 44 %
RIGHT VENTRICULAR END-DIASTOLIC DIMENSION: 4.2 CM
RV TISSUE DOPPLER FREE WALL SYSTOLIC VELOCITY 1 (APICAL 4 CHAMBER VIEW): 9.44 CM/S
SINUS: 2.62 CM
STJ: 2.64 CM
TDI LATERAL: 0.1 M/S
TDI SEPTAL: 0.05 M/S
TDI: 0.08 M/S
TR MAX PG: 47 MMHG
TRICUSPID ANNULAR PLANE SYSTOLIC EXCURSION: 2.42 CM
TV REST PULMONARY ARTERY PRESSURE: 50 MMHG

## 2020-03-06 PROCEDURE — 93306 ECHO (CUPID ONLY): ICD-10-PCS | Mod: HCNC,S$GLB,, | Performed by: INTERNAL MEDICINE

## 2020-03-06 PROCEDURE — 93306 TTE W/DOPPLER COMPLETE: CPT | Mod: HCNC,S$GLB,, | Performed by: INTERNAL MEDICINE

## 2020-03-06 NOTE — TELEPHONE ENCOUNTER
----- Message from Liborio Shirley III, MD sent at 3/6/2020  1:17 PM CST -----  Can you please let him know that the echo shows everything remains stable? It shows the same pumping function of 45%. The mechanical aortic valve is doing fine. No extra fluid in him to explain his shortness of breath. He needs to be active and walk regularly.    Thanks,  Yuriy

## 2020-03-16 ENCOUNTER — PATIENT OUTREACH (OUTPATIENT)
Dept: ADMINISTRATIVE | Facility: OTHER | Age: 80
End: 2020-03-16

## 2020-03-18 ENCOUNTER — OFFICE VISIT (OUTPATIENT)
Dept: CARDIOLOGY | Facility: CLINIC | Age: 80
End: 2020-03-18
Payer: MEDICARE

## 2020-03-18 ENCOUNTER — CLINICAL SUPPORT (OUTPATIENT)
Dept: CARDIOLOGY | Facility: CLINIC | Age: 80
End: 2020-03-18
Attending: INTERNAL MEDICINE
Payer: MEDICARE

## 2020-03-18 ENCOUNTER — TELEPHONE (OUTPATIENT)
Dept: CARDIAC CATH/INVASIVE PROCEDURES | Facility: HOSPITAL | Age: 80
End: 2020-03-18

## 2020-03-18 VITALS
WEIGHT: 189.13 LBS | OXYGEN SATURATION: 98 % | BODY MASS INDEX: 30.4 KG/M2 | DIASTOLIC BLOOD PRESSURE: 77 MMHG | HEIGHT: 66 IN | HEART RATE: 57 BPM | SYSTOLIC BLOOD PRESSURE: 176 MMHG

## 2020-03-18 DIAGNOSIS — I65.23 BILATERAL CAROTID ARTERY STENOSIS: ICD-10-CM

## 2020-03-18 DIAGNOSIS — E66.9 OBESITY (BMI 30.0-34.9): ICD-10-CM

## 2020-03-18 DIAGNOSIS — Z95.2 H/O MECHANICAL AORTIC VALVE REPLACEMENT: ICD-10-CM

## 2020-03-18 DIAGNOSIS — N18.30 TYPE 2 DIABETES MELLITUS WITH STAGE 3 CHRONIC KIDNEY DISEASE, WITHOUT LONG-TERM CURRENT USE OF INSULIN: ICD-10-CM

## 2020-03-18 DIAGNOSIS — I71.40 ABDOMINAL AORTIC ANEURYSM (AAA) WITHOUT RUPTURE: ICD-10-CM

## 2020-03-18 DIAGNOSIS — E78.2 MIXED HYPERLIPIDEMIA: ICD-10-CM

## 2020-03-18 DIAGNOSIS — R29.898 UPPER EXTREMITY WEAKNESS: ICD-10-CM

## 2020-03-18 DIAGNOSIS — E11.22 TYPE 2 DIABETES MELLITUS WITH STAGE 3 CHRONIC KIDNEY DISEASE, WITHOUT LONG-TERM CURRENT USE OF INSULIN: ICD-10-CM

## 2020-03-18 DIAGNOSIS — Z79.01 LONG TERM CURRENT USE OF ANTICOAGULANT THERAPY: ICD-10-CM

## 2020-03-18 DIAGNOSIS — I25.708 CORONARY ARTERY DISEASE OF BYPASS GRAFT OF NATIVE HEART WITH STABLE ANGINA PECTORIS: Primary | ICD-10-CM

## 2020-03-18 DIAGNOSIS — N18.30 CKD (CHRONIC KIDNEY DISEASE) STAGE 3, GFR 30-59 ML/MIN: ICD-10-CM

## 2020-03-18 DIAGNOSIS — I10 ESSENTIAL HYPERTENSION: ICD-10-CM

## 2020-03-18 LAB
LEFT CBA DIAS: 13 CM/S
LEFT CBA SYS: 91 CM/S
LEFT CCA DIST DIAS: 13 CM/S
LEFT CCA DIST SYS: 74 CM/S
LEFT CCA MID DIAS: 8 CM/S
LEFT CCA MID SYS: 50 CM/S
LEFT CCA PROX DIAS: 8 CM/S
LEFT CCA PROX SYS: 63 CM/S
LEFT ECA DIAS: 0 CM/S
LEFT ECA SYS: 84 CM/S
LEFT ICA DIST DIAS: 21 CM/S
LEFT ICA DIST SYS: 111 CM/S
LEFT ICA MID DIAS: 22 CM/S
LEFT ICA MID SYS: 116 CM/S
LEFT ICA PROX DIAS: 25 CM/S
LEFT ICA PROX SYS: 150 CM/S
LEFT VERTEBRAL DIAS: 7 CM/S
LEFT VERTEBRAL SYS: 46 CM/S
OHS CV CAROTID RIGHT ICA EDV HIGHEST: 20
OHS CV CAROTID ULTRASOUND LEFT ICA/CCA RATIO: 2.03
OHS CV CAROTID ULTRASOUND RIGHT ICA/CCA RATIO: 1.88
OHS CV PV CAROTID LEFT HIGHEST CCA: 74
OHS CV PV CAROTID LEFT HIGHEST ICA: 150
OHS CV PV CAROTID RIGHT HIGHEST CCA: 66
OHS CV PV CAROTID RIGHT HIGHEST ICA: 124
OHS CV US CAROTID LEFT HIGHEST EDV: 25
RIGHT ARM DIASTOLIC BLOOD PRESSURE: 77 MMHG
RIGHT ARM SYSTOLIC BLOOD PRESSURE: 176 MMHG
RIGHT CBA DIAS: 13 CM/S
RIGHT CBA SYS: 82 CM/S
RIGHT CCA DIST DIAS: 10 CM/S
RIGHT CCA DIST SYS: 66 CM/S
RIGHT CCA MID DIAS: 8 CM/S
RIGHT CCA MID SYS: 57 CM/S
RIGHT CCA PROX DIAS: 7 CM/S
RIGHT CCA PROX SYS: 49 CM/S
RIGHT ECA DIAS: 0 CM/S
RIGHT ECA SYS: 334 CM/S
RIGHT ICA DIST DIAS: 20 CM/S
RIGHT ICA DIST SYS: 81 CM/S
RIGHT ICA MID DIAS: 14 CM/S
RIGHT ICA MID SYS: 90 CM/S
RIGHT ICA PROX DIAS: 15 CM/S
RIGHT ICA PROX SYS: 124 CM/S
RIGHT VERTEBRAL DIAS: 10 CM/S
RIGHT VERTEBRAL SYS: 74 CM/S

## 2020-03-18 PROCEDURE — 3078F DIAST BP <80 MM HG: CPT | Mod: HCNC,CPTII,S$GLB, | Performed by: PHYSICIAN ASSISTANT

## 2020-03-18 PROCEDURE — 3077F SYST BP >= 140 MM HG: CPT | Mod: HCNC,CPTII,S$GLB, | Performed by: PHYSICIAN ASSISTANT

## 2020-03-18 PROCEDURE — 3078F PR MOST RECENT DIASTOLIC BLOOD PRESSURE < 80 MM HG: ICD-10-PCS | Mod: HCNC,CPTII,S$GLB, | Performed by: PHYSICIAN ASSISTANT

## 2020-03-18 PROCEDURE — 1126F AMNT PAIN NOTED NONE PRSNT: CPT | Mod: HCNC,S$GLB,, | Performed by: PHYSICIAN ASSISTANT

## 2020-03-18 PROCEDURE — 99214 PR OFFICE/OUTPT VISIT, EST, LEVL IV, 30-39 MIN: ICD-10-PCS | Mod: HCNC,S$GLB,, | Performed by: PHYSICIAN ASSISTANT

## 2020-03-18 PROCEDURE — 1126F PR PAIN SEVERITY QUANTIFIED, NO PAIN PRESENT: ICD-10-PCS | Mod: HCNC,S$GLB,, | Performed by: PHYSICIAN ASSISTANT

## 2020-03-18 PROCEDURE — 1101F PT FALLS ASSESS-DOCD LE1/YR: CPT | Mod: HCNC,CPTII,S$GLB, | Performed by: PHYSICIAN ASSISTANT

## 2020-03-18 PROCEDURE — 93880 CV US DOPPLER CAROTID (CUPID ONLY): ICD-10-PCS | Mod: HCNC,S$GLB,, | Performed by: INTERNAL MEDICINE

## 2020-03-18 PROCEDURE — 1101F PR PT FALLS ASSESS DOC 0-1 FALLS W/OUT INJ PAST YR: ICD-10-PCS | Mod: HCNC,CPTII,S$GLB, | Performed by: PHYSICIAN ASSISTANT

## 2020-03-18 PROCEDURE — 99999 PR PBB SHADOW E&M-EST. PATIENT-LVL III: CPT | Mod: PBBFAC,HCNC,, | Performed by: PHYSICIAN ASSISTANT

## 2020-03-18 PROCEDURE — 99214 OFFICE O/P EST MOD 30 MIN: CPT | Mod: HCNC,S$GLB,, | Performed by: PHYSICIAN ASSISTANT

## 2020-03-18 PROCEDURE — 93880 EXTRACRANIAL BILAT STUDY: CPT | Mod: HCNC,S$GLB,, | Performed by: INTERNAL MEDICINE

## 2020-03-18 PROCEDURE — 1159F PR MEDICATION LIST DOCUMENTED IN MEDICAL RECORD: ICD-10-PCS | Mod: HCNC,S$GLB,, | Performed by: PHYSICIAN ASSISTANT

## 2020-03-18 PROCEDURE — 3077F PR MOST RECENT SYSTOLIC BLOOD PRESSURE >= 140 MM HG: ICD-10-PCS | Mod: HCNC,CPTII,S$GLB, | Performed by: PHYSICIAN ASSISTANT

## 2020-03-18 PROCEDURE — 1159F MED LIST DOCD IN RCRD: CPT | Mod: HCNC,S$GLB,, | Performed by: PHYSICIAN ASSISTANT

## 2020-03-18 PROCEDURE — 99999 PR PBB SHADOW E&M-EST. PATIENT-LVL III: ICD-10-PCS | Mod: PBBFAC,HCNC,, | Performed by: PHYSICIAN ASSISTANT

## 2020-03-18 NOTE — TELEPHONE ENCOUNTER
Spoke with Mrs. Urbina regarding her 's carotid ultrasound results showing mild-mod b/l ICA stenosis and recommendation for continued medical management. She voiced understanding

## 2020-03-18 NOTE — PROGRESS NOTES
"    Interventional Cardiology Clinic Note  Reason for Visit: Coronary artery disease/peripheral vascular disease    HPI:   Dariel Urbina is a 79 y.o. male who presents for 6 month follow up.     Mr. Urbina has a PMH significant for CAD s/p CABG, mechanical aortic valve, AAA, carotid stenosis, PVD, DM, CKD III, and HLD who presents for 6 month follow up. Patient states he still has constant b/l arm numbness which causes him to constantly drop things. His right arm is worse than his left arm, and the numbness/weakness is mostly in his first 3 digits. He also complains of low back pain and b/l leg pain with walking, which is relieved with leaning forward. He states as long as he has a shopping cart to lean on, "he can walk all day". He was referred to neuro last visit but an appt was never made.     He also continues to have stable LÓPEZ and anginal chest pain. He becomes dyspneic after walking about 30-40 feet. His chest pain is exertional, substernal, non-radiating, and relieved with about 1 minute of rest. Last week, there were three days where he took one tab of nitro for symptom relief while he was using a leaf blower. He states the LÓPEZ and chest pain have been stable for years; he states he just decided to take Nitro this time to see if it would work. He has not had any angina at rest.     During the last clinic visit, he was complaining of lower extremity edema bilaterally, but mostly in his LLE. He had a LE venous US which was negative for DVT. He was switched to Bumex from Lasix by Dr. Shirley, and now the swelling is significantly reduced. He complies with a low sodium diet. He underwent a TTE last week which showed a mildly reduced LVEF of 45%, global hypokinesis, G2DD, Mechanical AV with peak velocity of 3.1 m/s, and PASP of 50 mmHg. There were no significant changes from prior study. Last time he saw Dr. Shirley, he was advised to keep a home blood pressure diary and update him in a week. The patient " states his blood pressure has been running 160-170s systolic. The patient is compliant with his medications. He denies palpitations, dizziness, syncope,  PND, orthopnea, difficulty speaking, TIAs, facial weakness, and hemiparesis.     ROS:    Constitution: Negative for decreased appetite, diaphoresis, fever, malaise/fatigue, weight gain and weight loss.   HENT: Negative for congestion, nosebleeds and sore throat.    Eyes: Negative for blurred vision, vision loss in left eye, vision loss in right eye and visual disturbance.  Cardiovascular: Negative for claudication, near-syncope, orthopnea, palpitations, paroxysmal nocturnal dyspnea and syncope. Positive for chest pain, dyspnea on exertion, leg swelling.   Respiratory: Negative for cough, hemoptysis, snoring, shortness of breath and wheezing..    Hematologic/Lymphatic: Negative for bleeding problem. Does not bruise/bleed easily.   Endocrine: Negative for polyuria  Musculoskeletal: Negative for muscle cramps and myalgias. Positive for back and b/l leg pain  Gastrointestinal: Negative for abdominal pain, change in bowel habit, diarrhea, heartburn, hematemesis, hematochezia, melena, nausea and vomiting.   Neurological: Negative for dizziness, headaches and light-headedness. Positive for b/l hand weakness/numbness R>L  Psychiatric/Behavioral: Negative for depression.   Allergic/Immunologic: Negative for hives.   PMH:     Past Medical History:   Diagnosis Date    Anemia of chronic renal failure, stage 3 (moderate) 5/27/2015    Atherosclerosis of coronary artery bypass graft of native heart without angina pectoris 9/11/2012    3-27-18 [facility] Two vessel coronary artery disease.   Prosthetic aortic valve.   Porcelain aorta.   Patent LIMA graft.    Bilateral carotid artery disease 2/9/2017    Bleeding from the nose     Bleeding nose 3/21/2018    Cataract     CKD (chronic kidney disease) stage 3, GFR 30-59 ml/min 5/27/2015    Claudication of left lower extremity  9/17/2014    Colon polyp     Gastroesophageal reflux disease without esophagitis 3/19/2018    Gastrointestinal hemorrhage associated with intestinal diverticulosis 4/1/2018    H/O mechanical aortic valve replacement 9/17/2014    History of gout 9/26/2012    Hyperparathyroidism due to renal insufficiency 7/27/2015    Internal hemorrhoid 4/3/2018    Long term current use of anticoagulant therapy 9/26/2012    Mechanical heart valve present     Metabolic acidosis with normal anion gap and bicarbonate losses 3/20/2018    Mixed hyperlipidemia 9/26/2012    NSTEMI (non-ST elevated myocardial infarction) 3/21/2018    Obesity, diabetes, and hypertension syndrome 2/23/2016    PVD (peripheral vascular disease) 9/11/2012    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/2012    Type 2 diabetes mellitus with diabetic peripheral angiopathy without gangrene 5/27/2015    Type 2 diabetes mellitus with stage 3 chronic kidney disease, without long-term current use of insulin 10/2/2013     Past Surgical History:   Procedure Laterality Date    CARDIAC CATHETERIZATION      CARDIAC VALVE REPLACEMENT      CARDIAC VALVE SURGERY      COLON SURGERY      COLONOSCOPY N/A 3/31/2017    Procedure: COLONOSCOPY;  Surgeon: Bruno Raymond MD;  Location: [facility] (4TH FLR);  Service: Endoscopy;  Laterality: N/A;  Patient's wife requesting date.    COLONOSCOPY N/A 4/3/2018    Procedure: COLONOSCOPY;  Surgeon: Bonifacio Pelletier MD;  Location: [facility] ENDO (2ND FLR);  Service: Endoscopy;  Laterality: N/A;    COLONOSCOPY N/A 8/13/2018    Procedure: COLONOSCOPY;  Surgeon: Kam Barba MD;  Location: [facility] ENDO (2ND FLR);  Service: Endoscopy;  Laterality: N/A;  2nd floor: PA pressure 49; hx of moderate-severe valve disease     per Coumadin clinic-Patient can hold 5 days with lovenox bridge       ok to schedule per Katarina    CORONARY ANGIOPLASTY      CORONARY ARTERY BYPASS GRAFT      HERNIA REPAIR      SPINE SURGERY      VASECTOMY        Allergies:     Review of patient's allergies indicates:   Allergen Reactions    Fosinopril      Intolerance- elevates potassium level      Losartan      Intolerance- elevates potassium level     Medications:     Current Outpatient Medications on File Prior to Visit   Medication Sig Dispense Refill    allopurinol (ZYLOPRIM) 100 MG tablet TAKE 1 TABLET EVERY DAY 90 tablet 3    aspirin (ECOTRIN) 81 MG EC tablet Take 1 tablet (81 mg total) by mouth once daily.  0    bumetanide (BUMEX) 2 MG tablet Take 2mg Once to twice daily or as directed 180 tablet 3    carvedilol (COREG) 12.5 MG tablet Take 1 tablet (12.5 mg total) by mouth 2 (two) times daily. 180 tablet 4    glimepiride (AMARYL) 1 MG tablet Take 1 tablet (1 mg total) by mouth every morning. 90 tablet 3    isosorbide mononitrate (IMDUR) 120 MG 24 hr tablet TAKE 1 TABLET EVERY DAY 90 tablet 4    NIFEdipine (PROCARDIA-XL) 60 MG (OSM) 24 hr tablet Take 1 tablet (60 mg total) by mouth once daily. 90 tablet 4    omega-3 acid ethyl esters (LOVAZA) 1 gram capsule Take 2 capsules (2 g total) by mouth 2 (two) times daily. 360 capsule 5    rosuvastatin (CRESTOR) 40 MG Tab Take 1 tablet (40 mg total) by mouth every evening. 90 tablet 3    warfarin (COUMADIN) 5 MG tablet TAKE 1 & 1/2 TO 2 TABLETS (7.5 TO 10 MG TOTAL) BY  MOUTH DAILY AS DIRECTED BY COUMADIN CLINIC 180 tablet 3    nitroGLYCERIN (NITROSTAT) 0.4 MG SL tablet Place 1 tablet (0.4 mg total) under the tongue every 5 (five) minutes as needed. As needed for chest pain (Patient not taking: Reported on 3/18/2020) 90 tablet 4     No current facility-administered medications on file prior to visit.      Social History:     Social History     Tobacco Use    Smoking status: Former Smoker     Packs/day: 1.00     Years: 20.00     Pack years: 20.00     Last attempt to quit: 1980     Years since quittin.5    Smokeless tobacco: Never Used   Substance Use Topics    Alcohol use: No     Family History:  "    Family History   Problem Relation Age of Onset    Heart failure Mother     Heart disease Mother     Heart failure Father     Heart disease Father     Alcohol abuse Father     Heart failure Brother     Heart disease Brother     Diabetes Brother     No Known Problems Sister     No Known Problems Maternal Grandmother     No Known Problems Maternal Grandfather     No Known Problems Paternal Grandmother     No Known Problems Paternal Grandfather     Heart disease Sister     No Known Problems Maternal Aunt     No Known Problems Maternal Uncle     No Known Problems Paternal Aunt     No Known Problems Paternal Uncle     Amblyopia Neg Hx     Blindness Neg Hx     Cancer Neg Hx     Cataracts Neg Hx     Glaucoma Neg Hx     Hypertension Neg Hx     Macular degeneration Neg Hx     Retinal detachment Neg Hx     Strabismus Neg Hx     Stroke Neg Hx     Thyroid disease Neg Hx     Anemia Neg Hx     Arrhythmia Neg Hx     Asthma Neg Hx     Clotting disorder Neg Hx     Fainting Neg Hx     Heart attack Neg Hx     Hyperlipidemia Neg Hx     Atrial Septal Defect Neg Hx     Melanoma Neg Hx      Physical Exam:   BP (!) 176/77 (BP Location: Right arm, Patient Position: Sitting, BP Method: Large (Automatic))   Pulse (!) 57   Ht 5' 6" (1.676 m)   Wt 85.8 kg (189 lb 2.5 oz)   SpO2 98%   BMI 30.53 kg/m²      Constitutional: NAD, conversant  HEENT: Sclera anicteric, PERRLA, EOMI  Neck: No JVD, no carotid bruits  CV: RRR, 2/6 systolic ejection murmur with mechanical click, normal S1/S2  Pulm: Effort normal and breath sounds normal. No respiratory distress. He has no wheezes. He has no rales.   GI: Abdomen soft, NTND, +BS  Extremities: 1+ LLE edema, 1+ RLE edema, warm and well perfused  Skin: No ecchymosis, erythema, or ulcers  Psychiatric: He has a normal mood and affect. His behavior is normal. Judgment and thought content normal.   Neuro: No gross deficits, normal  strength b/l    Labs:     Lab " Results   Component Value Date     2020    K 4.6 2020    CL 99 2020    CO2 31 (H) 2020    BUN 54 (H) 2020    CREATININE 1.9 (H) 2020    ANIONGAP 11 2020     Lab Results   Component Value Date    HGBA1C 5.5 2019     Lab Results   Component Value Date     (H) 2020     (H) 2018    Lab Results   Component Value Date    WBC 5.69 2020    HGB 11.9 (L) 2020    HCT 35.4 (L) 2020     (L) 2020    GRAN 3.1 2020    GRAN 54.6 2020     Lab Results   Component Value Date    CHOL 194 2019    HDL 40 2019    LDLCALC 98.6 2019    TRIG 277 (H) 2019          Imagin. TTE (3/6/20)  · Mildly decreased left ventricular systolic function. The estimated ejection fraction is 45%. QEF 44%. Global hypokinetic wall motion.  · Grade II (moderate) left ventricular diastolic dysfunction consistent with pseudonormalization.  · There is a mechanical aortic valve present. Peak velocity = 3.1m/s, acceleration time <100msec, DI 0.27. Trace aortic regurgitation.  · Mild-to-moderate mitral regurgitation.  · Normal right ventricular systolic function.  · Mild tricuspid regurgitation.  · The estimated PA systolic pressure is 50 mmHg.  · Normal central venous pressure (3 mmHg).  · Pulmonary hypertension present.  · Severe left atrial enlargement.    2. Carotid ultrasound (3/18/20)  · There is 20-39% right Internal Carotid Stenosis.  · There is 40-49% left Internal Carotid Stenosis.  Assessment:     1. Coronary artery disease of bypass graft of native heart with stable angina pectoris    2. Mixed hyperlipidemia    3. H/O mechanical aortic valve replacement    4. Abdominal aortic aneurysm (AAA) without rupture    5. Bilateral carotid artery stenosis    6. Essential hypertension    7. CKD (chronic kidney disease) stage 3, GFR 30-59 ml/min    8. Long term current use of anticoagulant therapy    9. Obesity (BMI  30.0-34.9)    10. Upper extremity weakness    11. Type 2 diabetes mellitus with stage 3 chronic kidney disease, without long-term current use of insulin      Plan:     Coronary artery disease of bypass graft of native heart with stable angina pectoris  -- Stable angina and LÓPEZ. Relieved with 1-2 minutes of rest of one tab of Nitro. Denies sxs at rest or worsening intensity/frequency. Discussed with patient that he could try Ranexa for additional anginal relief, with the goal to allow him to be as active as possible without developing angina. The patient states he prefers to stay on his current medication regimen at this time. Advised him to let us know if his symptoms seem to be worsening. Discussed with Dr. Vences.   -- Continue Aspirin 81 mg daily   -- Continue Crestor 40 mg qhs   -- Continue Imdur 120 mg daily   -- Continue fibrate  -- Continue Nitro PRN.   -- Follow up in 6 months    Mixed hyperlipidemia  -- Continue Crestor 40 mg qhs. Goal LDL <70.   -- Transition to a low salt, mediterranean-style diet which emphasizes:     · Eating primarily plant-based foods, such as fruits and vegetables, whole grains, legumes and nuts   · Replacing butter with healthy fats, such as olive oil   · Using herbs and spices instead of salt to flavor foods   · Limiting red meat to no more than a few times a month   · Eating fish and poultry at least twice a week     H/O mechanical aortic valve replacement  -- Continue Warfarin. Goal 2.5-3.5    Aneurysm of aorta  - Abdominal ultrasound from 5/10/18 reviewed  - Suprarenal AAA 3.32 cm    Bilateral carotid artery disease  -- Asymptomatic. 40-49% EZEQUIEL stenosis.   -- Continue medical management    HTN (hypertension)  -- Uncontrolled. Patient is keeping blood pressure diary and is going to notify Dr. Shirley of home readings for further medication adjustments.   -- Cont Coreg 12.5 mg bid   -- Cont Nifedipine 60 mg daily     CKD (chronic kidney disease) stage 3, GFR 30-59 ml/min  --  Patient is high risk for any interventional procedures given CKD as well as bleeding risk on Warfarin.   -- Not on ACE/ARB for reduced EF. Defer to Dr. Shirley.     Obesity (BMI 30.0-34.9)  -- Recommend increasing aerobic exercise to the best of his ability and targeted weight loss.     Upper extremity weakness  -- Patient scheduled for visit with neuro later today.       Signed:  Chantel Wilder PA-C  Interventional Cardiology   [id number]  3/18/2020 10:25 AM

## 2020-03-19 ENCOUNTER — TELEPHONE (OUTPATIENT)
Dept: NEUROLOGY | Facility: CLINIC | Age: 80
End: 2020-03-19

## 2020-03-30 ENCOUNTER — ANTI-COAG VISIT (OUTPATIENT)
Dept: CARDIOLOGY | Facility: CLINIC | Age: 80
End: 2020-03-30
Payer: MEDICARE

## 2020-03-30 ENCOUNTER — LAB VISIT (OUTPATIENT)
Dept: LAB | Facility: HOSPITAL | Age: 80
End: 2020-03-30
Attending: INTERNAL MEDICINE
Payer: MEDICARE

## 2020-03-30 DIAGNOSIS — Z79.01 LONG TERM CURRENT USE OF ANTICOAGULANT THERAPY: ICD-10-CM

## 2020-03-30 DIAGNOSIS — Z95.2 H/O MECHANICAL AORTIC VALVE REPLACEMENT: ICD-10-CM

## 2020-03-30 LAB
INR PPP: 2.8 (ref 0.8–1.2)
PROTHROMBIN TIME: 26.7 SEC (ref 9–12.5)

## 2020-03-30 PROCEDURE — 93793 PR ANTICOAGULANT MGMT FOR PT TAKING WARFARIN: ICD-10-PCS | Mod: S$GLB,,,

## 2020-03-30 PROCEDURE — 85610 PROTHROMBIN TIME: CPT | Mod: HCNC

## 2020-03-30 PROCEDURE — 93793 ANTICOAG MGMT PT WARFARIN: CPT | Mod: S$GLB,,,

## 2020-03-30 PROCEDURE — 36415 COLL VENOUS BLD VENIPUNCTURE: CPT | Mod: HCNC

## 2020-04-23 ENCOUNTER — TELEPHONE (OUTPATIENT)
Dept: NEUROLOGY | Facility: CLINIC | Age: 80
End: 2020-04-23

## 2020-04-27 ENCOUNTER — LAB VISIT (OUTPATIENT)
Dept: LAB | Facility: HOSPITAL | Age: 80
End: 2020-04-27
Attending: INTERNAL MEDICINE
Payer: MEDICARE

## 2020-04-27 ENCOUNTER — ANTI-COAG VISIT (OUTPATIENT)
Dept: CARDIOLOGY | Facility: CLINIC | Age: 80
End: 2020-04-27
Payer: MEDICARE

## 2020-04-27 DIAGNOSIS — Z95.2 H/O MECHANICAL AORTIC VALVE REPLACEMENT: ICD-10-CM

## 2020-04-27 DIAGNOSIS — Z79.01 LONG TERM CURRENT USE OF ANTICOAGULANT THERAPY: ICD-10-CM

## 2020-04-27 LAB
INR PPP: 3.7 (ref 0.8–1.2)
PROTHROMBIN TIME: 35.1 SEC (ref 9–12.5)

## 2020-04-27 PROCEDURE — 36415 COLL VENOUS BLD VENIPUNCTURE: CPT | Mod: HCNC

## 2020-04-27 PROCEDURE — 93793 PR ANTICOAGULANT MGMT FOR PT TAKING WARFARIN: ICD-10-PCS | Mod: S$GLB,,, | Performed by: PHARMACIST

## 2020-04-27 PROCEDURE — 93793 ANTICOAG MGMT PT WARFARIN: CPT | Mod: S$GLB,,, | Performed by: PHARMACIST

## 2020-04-27 PROCEDURE — 85610 PROTHROMBIN TIME: CPT | Mod: HCNC

## 2020-04-30 ENCOUNTER — OFFICE VISIT (OUTPATIENT)
Dept: NEUROLOGY | Facility: CLINIC | Age: 80
End: 2020-04-30
Payer: MEDICARE

## 2020-04-30 ENCOUNTER — PATIENT OUTREACH (OUTPATIENT)
Dept: ADMINISTRATIVE | Facility: OTHER | Age: 80
End: 2020-04-30

## 2020-04-30 DIAGNOSIS — R20.0 NUMBNESS AND TINGLING IN BOTH HANDS: ICD-10-CM

## 2020-04-30 DIAGNOSIS — R20.2 NUMBNESS AND TINGLING IN BOTH HANDS: ICD-10-CM

## 2020-04-30 DIAGNOSIS — G56.03 BILATERAL CARPAL TUNNEL SYNDROME: Primary | ICD-10-CM

## 2020-04-30 PROCEDURE — 1101F PT FALLS ASSESS-DOCD LE1/YR: CPT | Mod: 95,HCNC,CPTII,GC | Performed by: PSYCHIATRY & NEUROLOGY

## 2020-04-30 PROCEDURE — 1159F MED LIST DOCD IN RCRD: CPT | Mod: HCNC,95,GC, | Performed by: PSYCHIATRY & NEUROLOGY

## 2020-04-30 PROCEDURE — 1159F PR MEDICATION LIST DOCUMENTED IN MEDICAL RECORD: ICD-10-PCS | Mod: HCNC,95,GC, | Performed by: PSYCHIATRY & NEUROLOGY

## 2020-04-30 PROCEDURE — 1101F PR PT FALLS ASSESS DOC 0-1 FALLS W/OUT INJ PAST YR: ICD-10-PCS | Mod: 95,HCNC,CPTII,GC | Performed by: PSYCHIATRY & NEUROLOGY

## 2020-04-30 PROCEDURE — 99443 PR PHYSICIAN TELEPHONE EVALUATION 21-30 MIN: CPT | Mod: HCNC,95,GC, | Performed by: PSYCHIATRY & NEUROLOGY

## 2020-04-30 PROCEDURE — 99443 PR PHYSICIAN TELEPHONE EVALUATION 21-30 MIN: ICD-10-PCS | Mod: HCNC,95,GC, | Performed by: PSYCHIATRY & NEUROLOGY

## 2020-04-30 NOTE — PROGRESS NOTES
"Established Patient - Audio Only Telehealth Visit     The patient location is: home  The chief complaint leading to consultation is: weakness, numbness and tingling   Visit type: Virtual visit with audio only (telephone)  Time Spent with Patient: 40 minutes     The reason for the audio only service rather than synchronous audio and video virtual visit was related to technical difficulties or patient preference/necessity.     Each patient to whom I provide medical services by telemedicine is:  (1) informed of the relationship between the physician and patient and the respective role of any other health care provider with respect to management of the patient; and (2) notified that they may decline to receive medical services by telemedicine and may withdraw from such care at any time. Patient verbally consented to receive this service via voice-only telephone call.       HPI:   Mr. Urbina is a 79 year-old male with CAD s/p CABG, s/p AVR (on warfarin), DM II, HTN, HLD, AAA, HFpEF (EF 50-55%) who is here for an audio only visit for reported weakness and numbness in the R>L hand/ States that this has been going on for "months".    Reports that there are 3 digits that are numb and tingling on the R hand (Thumb, pointer and middle finger)  - states that the feeling is constant, worse at night, and does wake him up. Reports that recently he has had a similar feeling, although intermittent, in his L hand, but states that all of the digits are involved on that hand.  States that it is difficult for him to  and hold things, especially w/ his R hand and that he has dropped objects as well. He denies any sharp/shooting pain originating from the neck on either of the side.   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   Wife is also on the phone and provides information - states that she does not note wasting of the "fat pad" by the thumb on either of the sides, but does state that the 3 affected digits on the R hand " "always look "white" to her, like they are "not getting enough blood".     As per Dr. Snyder ( ) note on 01/06/2020)  "He has also been experiencing numbness/tingling in his hands. He has chronic R-sided numbness in digits 1-3 2/2 cervical radiculopathy. He has now been experiencing numbness/tingling in L hand (3 episodes)."    Imaging reviewed personally  X-Ray Cervical Spine 12/26/2018 - Alignment shows grade 1 anterolisthesis at C7-T1 and T1-2, likely secondary to degenerative change.  Degenerative disc disease is again noted at C4-5 and C5-6 with narrowing and osteophytes. C6-7 disc space looks solidly fused.  The oblique views show spurs encroaching on the neural foramina on both sides from C3 to C6.  Mild degenerative change is also seen at the C7-T1 disc space.    Assessment and plan:    Discussed patient's symptoms over the phone. Based on the description, patient likely suffers from CTS. He only reports numbness and pain in his hands, denies any shooting pain from the neck down, making radiculopathy less likely.   Recommendations for splinting at night ( discussed that braces are available OTC), as well as a NCS/EMG prior to referral to Hand Surgery clinic.   Discussed plan w/ patient and wife.   Reviewed recommendations w/ Dr. Mcclendon.   Will follow up in clinic, hopefully w/ a face to face visit.        This service was not originating from a related E/M service provided within the previous 7 days nor will  to an E/M service or procedure within the next 24 hours or my soonest available appointment.  Prevailing standard of care was able to be met in this audio-only visit.      "

## 2020-05-05 ENCOUNTER — PROCEDURE VISIT (OUTPATIENT)
Dept: NEUROLOGY | Facility: CLINIC | Age: 80
End: 2020-05-05
Payer: MEDICARE

## 2020-05-05 DIAGNOSIS — G56.03 BILATERAL CARPAL TUNNEL SYNDROME: ICD-10-CM

## 2020-05-05 DIAGNOSIS — R20.2 NUMBNESS AND TINGLING IN BOTH HANDS: ICD-10-CM

## 2020-05-05 DIAGNOSIS — R20.0 NUMBNESS AND TINGLING IN BOTH HANDS: ICD-10-CM

## 2020-05-05 PROCEDURE — 95886 PR EMG COMPLETE, W/ NERVE CONDUCTION STUDIES, 5+ MUSCLES: ICD-10-PCS | Mod: HCNC,S$GLB,, | Performed by: PSYCHIATRY & NEUROLOGY

## 2020-05-05 PROCEDURE — 95913 PR NERVE CONDUCTION STUDY; 13 OR MORE STUDIES: ICD-10-PCS | Mod: HCNC,S$GLB,, | Performed by: PSYCHIATRY & NEUROLOGY

## 2020-05-05 PROCEDURE — 95886 MUSC TEST DONE W/N TEST COMP: CPT | Mod: HCNC,S$GLB,, | Performed by: PSYCHIATRY & NEUROLOGY

## 2020-05-05 PROCEDURE — 95913 NRV CNDJ TEST 13/> STUDIES: CPT | Mod: HCNC,S$GLB,, | Performed by: PSYCHIATRY & NEUROLOGY

## 2020-05-05 NOTE — PROCEDURES
Procedures     Please see NCS/EMG procedure report    Pierre Mcclendon MD  [facility]

## 2020-05-06 ENCOUNTER — TELEPHONE (OUTPATIENT)
Dept: NEUROLOGY | Facility: CLINIC | Age: 80
End: 2020-05-06

## 2020-05-06 DIAGNOSIS — G56.03 BILATERAL CARPAL TUNNEL SYNDROME: Primary | ICD-10-CM

## 2020-05-06 NOTE — TELEPHONE ENCOUNTER
----- Message from January Taylor MD sent at 5/6/2020  9:42 AM CDT -----  Morning, Dom!  Would you mind helping out with scheduling this gentleman in hand clinic ASAP for his carpal tunnel? Thank you!  January

## 2020-05-07 ENCOUNTER — PATIENT OUTREACH (OUTPATIENT)
Dept: ADMINISTRATIVE | Facility: OTHER | Age: 80
End: 2020-05-07

## 2020-05-07 ENCOUNTER — TELEPHONE (OUTPATIENT)
Dept: ORTHOPEDICS | Facility: CLINIC | Age: 80
End: 2020-05-07

## 2020-05-07 DIAGNOSIS — M79.641 BILATERAL HAND PAIN: Primary | ICD-10-CM

## 2020-05-07 DIAGNOSIS — M79.642 BILATERAL HAND PAIN: Primary | ICD-10-CM

## 2020-05-07 NOTE — PROGRESS NOTES
Chart reviewed.   Immunizations: Triggered Imm Registry     Orders placed: n/a  Upcoming appts to satisfy ANTONIO topics: n/a

## 2020-05-07 NOTE — TELEPHONE ENCOUNTER
Informed patient's spouse about upcoming appointment and x-ray. Provided the address. Patient's spouse verbalized understanding.

## 2020-05-08 ENCOUNTER — HOSPITAL ENCOUNTER (OUTPATIENT)
Dept: RADIOLOGY | Facility: OTHER | Age: 80
Discharge: HOME | End: 2020-05-08
Attending: PLASTIC SURGERY
Payer: MEDICARE

## 2020-05-08 ENCOUNTER — OFFICE VISIT (OUTPATIENT)
Dept: ORTHOPEDICS | Facility: CLINIC | Age: 80
End: 2020-05-08
Payer: MEDICARE

## 2020-05-08 ENCOUNTER — TELEPHONE (OUTPATIENT)
Dept: ORTHOPEDICS | Facility: CLINIC | Age: 80
End: 2020-05-08

## 2020-05-08 VITALS
DIASTOLIC BLOOD PRESSURE: 58 MMHG | WEIGHT: 189 LBS | HEIGHT: 66 IN | SYSTOLIC BLOOD PRESSURE: 133 MMHG | BODY MASS INDEX: 30.37 KG/M2 | HEART RATE: 58 BPM

## 2020-05-08 DIAGNOSIS — M79.641 BILATERAL HAND PAIN: ICD-10-CM

## 2020-05-08 DIAGNOSIS — G56.02 LEFT CARPAL TUNNEL SYNDROME: ICD-10-CM

## 2020-05-08 DIAGNOSIS — Z01.818 PREOP EXAMINATION: Primary | ICD-10-CM

## 2020-05-08 DIAGNOSIS — M79.642 BILATERAL HAND PAIN: ICD-10-CM

## 2020-05-08 DIAGNOSIS — G56.01 SEVERE CARPAL TUNNEL SYNDROME OF RIGHT WRIST: Primary | ICD-10-CM

## 2020-05-08 DIAGNOSIS — G56.03 BILATERAL CARPAL TUNNEL SYNDROME: ICD-10-CM

## 2020-05-08 PROCEDURE — 1101F PT FALLS ASSESS-DOCD LE1/YR: CPT | Mod: HCNC,CPTII,S$GLB, | Performed by: PLASTIC SURGERY

## 2020-05-08 PROCEDURE — 1126F AMNT PAIN NOTED NONE PRSNT: CPT | Mod: HCNC,S$GLB,, | Performed by: PLASTIC SURGERY

## 2020-05-08 PROCEDURE — 3078F PR MOST RECENT DIASTOLIC BLOOD PRESSURE < 80 MM HG: ICD-10-PCS | Mod: HCNC,CPTII,S$GLB, | Performed by: PLASTIC SURGERY

## 2020-05-08 PROCEDURE — 73130 X-RAY EXAM OF HAND: CPT | Mod: TC,50,HCNC,FY

## 2020-05-08 PROCEDURE — 1126F PR PAIN SEVERITY QUANTIFIED, NO PAIN PRESENT: ICD-10-PCS | Mod: HCNC,S$GLB,, | Performed by: PLASTIC SURGERY

## 2020-05-08 PROCEDURE — 1159F MED LIST DOCD IN RCRD: CPT | Mod: HCNC,S$GLB,, | Performed by: PLASTIC SURGERY

## 2020-05-08 PROCEDURE — 99999 PR PBB SHADOW E&M-EST. PATIENT-LVL V: ICD-10-PCS | Mod: PBBFAC,HCNC,, | Performed by: PLASTIC SURGERY

## 2020-05-08 PROCEDURE — 1101F PR PT FALLS ASSESS DOC 0-1 FALLS W/OUT INJ PAST YR: ICD-10-PCS | Mod: HCNC,CPTII,S$GLB, | Performed by: PLASTIC SURGERY

## 2020-05-08 PROCEDURE — 99204 PR OFFICE/OUTPT VISIT, NEW, LEVL IV, 45-59 MIN: ICD-10-PCS | Mod: 25,HCNC,S$GLB, | Performed by: PLASTIC SURGERY

## 2020-05-08 PROCEDURE — 99204 OFFICE O/P NEW MOD 45 MIN: CPT | Mod: 25,HCNC,S$GLB, | Performed by: PLASTIC SURGERY

## 2020-05-08 PROCEDURE — 3075F PR MOST RECENT SYSTOLIC BLOOD PRESS GE 130-139MM HG: ICD-10-PCS | Mod: HCNC,CPTII,S$GLB, | Performed by: PLASTIC SURGERY

## 2020-05-08 PROCEDURE — 73130 XR HAND COMPLETE 3 VIEWS BILATERAL: ICD-10-PCS | Mod: 26,50,HCNC, | Performed by: RADIOLOGY

## 2020-05-08 PROCEDURE — 73130 X-RAY EXAM OF HAND: CPT | Mod: 26,50,HCNC, | Performed by: RADIOLOGY

## 2020-05-08 PROCEDURE — 3075F SYST BP GE 130 - 139MM HG: CPT | Mod: HCNC,CPTII,S$GLB, | Performed by: PLASTIC SURGERY

## 2020-05-08 PROCEDURE — 20526 PR INJECT CARPAL TUNNEL: ICD-10-PCS | Mod: HCNC,LT,S$GLB, | Performed by: PLASTIC SURGERY

## 2020-05-08 PROCEDURE — 20526 THER INJECTION CARP TUNNEL: CPT | Mod: HCNC,LT,S$GLB, | Performed by: PLASTIC SURGERY

## 2020-05-08 PROCEDURE — 3078F DIAST BP <80 MM HG: CPT | Mod: HCNC,CPTII,S$GLB, | Performed by: PLASTIC SURGERY

## 2020-05-08 PROCEDURE — 99999 PR PBB SHADOW E&M-EST. PATIENT-LVL V: CPT | Mod: PBBFAC,HCNC,, | Performed by: PLASTIC SURGERY

## 2020-05-08 PROCEDURE — 1159F PR MEDICATION LIST DOCUMENTED IN MEDICAL RECORD: ICD-10-PCS | Mod: HCNC,S$GLB,, | Performed by: PLASTIC SURGERY

## 2020-05-08 RX ORDER — SODIUM CHLORIDE 9 MG/ML
INJECTION, SOLUTION INTRAVENOUS CONTINUOUS
Status: CANCELLED | OUTPATIENT
Start: 2020-05-08

## 2020-05-08 RX ORDER — TRIAMCINOLONE ACETONIDE 40 MG/ML
40 INJECTION, SUSPENSION INTRA-ARTICULAR; INTRAMUSCULAR
Status: COMPLETED | OUTPATIENT
Start: 2020-05-08 | End: 2020-05-08

## 2020-05-08 RX ADMIN — TRIAMCINOLONE ACETONIDE 40 MG: 40 INJECTION, SUSPENSION INTRA-ARTICULAR; INTRAMUSCULAR at 10:05

## 2020-05-08 NOTE — PROGRESS NOTES
Chief Complaint: Numbness of the Left Hand and Numbness of the Right Hand      HPI:    Dariel Urbina is a right hand dominant 79 y.o. male presenting today for bilateral carpal tunnel syndrome.  The patient states that over last year he has begun to notice near constant numbness within the thumb index and middle fingers of the right hand.  He also experiences numbness and tingling within the left hand.  His right hand is much worse.  He states that has become more difficult to perform daily activities such is is a bring his pants or buttoning his shirt.  He was recently seen by Dr. Hodgson at [facility] who performed an EMG which demonstrated severe bilateral carpal tunnel syndrome and evidence of underlying peripheral neuropathy.  [facility].  He denies any recent or previous history of trauma and has no other complaints today.    Past Medical History:   Diagnosis Date    Anemia of chronic renal failure, stage 3 (moderate) 5/27/2015    Atherosclerosis of coronary artery bypass graft of native heart without angina pectoris 9/11/2012    3-27-18 [facility] Two vessel coronary artery disease.   Prosthetic aortic valve.   Porcelain aorta.   Patent LIMA graft.    Bilateral carotid artery disease 2/9/2017    Bleeding from the nose     Bleeding nose 3/21/2018    Cataract     CKD (chronic kidney disease) stage 3, GFR 30-59 ml/min 5/27/2015    Claudication of left lower extremity 9/17/2014    Colon polyp     Gastroesophageal reflux disease without esophagitis 3/19/2018    Gastrointestinal hemorrhage associated with intestinal diverticulosis 4/1/2018    H/O mechanical aortic valve replacement 9/17/2014    History of gout 9/26/2012    Hyperparathyroidism due to renal insufficiency 7/27/2015    Internal hemorrhoid 4/3/2018    Long term current use of anticoagulant therapy 9/26/2012    Mechanical heart valve present     Metabolic acidosis with normal anion  gap and bicarbonate losses 3/20/2018    Mixed hyperlipidemia 9/26/2012    NSTEMI (non-ST elevated myocardial infarction) 3/21/2018    Obesity, diabetes, and hypertension syndrome 2/23/2016    PVD (peripheral vascular disease) 9/11/2012    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/2012    Type 2 diabetes mellitus with diabetic peripheral angiopathy without gangrene 5/27/2015    Type 2 diabetes mellitus with stage 3 chronic kidney disease, without long-term current use of insulin 10/2/2013       Past Surgical History:   Procedure Laterality Date    CARDIAC CATHETERIZATION      CARDIAC VALVE REPLACEMENT      CARDIAC VALVE SURGERY      COLON SURGERY      COLONOSCOPY N/A 3/31/2017    Procedure: COLONOSCOPY;  Surgeon: Bruno Raymond MD;  Location: [facility] ENDO (4TH FLR);  Service: Endoscopy;  Laterality: N/A;  Patient's wife requesting date.    COLONOSCOPY N/A 4/3/2018    Procedure: COLONOSCOPY;  Surgeon: Bonifacio Pelletier MD;  Location: [facility] ENDO (2ND FLR);  Service: Endoscopy;  Laterality: N/A;    COLONOSCOPY N/A 8/13/2018    Procedure: COLONOSCOPY;  Surgeon: Kam Barba MD;  Location: [facility] ENDO (2ND FLR);  Service: Endoscopy;  Laterality: N/A;  2nd floor: PA pressure 49; hx of moderate-severe valve disease     per Coumadin clinic-Patient can hold 5 days with lovenox bridge       ok to schedule per Katarina    CORONARY ANGIOPLASTY      CORONARY ARTERY BYPASS GRAFT      HERNIA REPAIR      SPINE SURGERY      VASECTOMY          Family History   Problem Relation Age of Onset    Heart failure Mother     Heart disease Mother     Heart failure Father     Heart disease Father     Alcohol abuse Father     Heart failure Brother     Heart disease Brother     Diabetes Brother     No Known Problems Sister     No Known Problems Maternal Grandmother     No Known Problems Maternal Grandfather     No Known Problems Paternal Grandmother     No Known Problems Paternal Grandfather     Heart disease Sister     No  Known Problems Maternal Aunt     No Known Problems Maternal Uncle     No Known Problems Paternal Aunt     No Known Problems Paternal Uncle     Amblyopia Neg Hx     Blindness Neg Hx     Cancer Neg Hx     Cataracts Neg Hx     Glaucoma Neg Hx     Hypertension Neg Hx     Macular degeneration Neg Hx     Retinal detachment Neg Hx     Strabismus Neg Hx     Stroke Neg Hx     Thyroid disease Neg Hx     Anemia Neg Hx     Arrhythmia Neg Hx     Asthma Neg Hx     Clotting disorder Neg Hx     Fainting Neg Hx     Heart attack Neg Hx     Hyperlipidemia Neg Hx     Atrial Septal Defect Neg Hx     Melanoma Neg Hx        Social History     Socioeconomic History    Marital status:      Spouse name: Ameena    Number of children: 3    Years of education: Not on file    Highest education level: Not on file   Occupational History    Occupation: retired   Social Needs    Financial resource strain: Not on file    Food insecurity:     Worry: Not on file     Inability: Not on file    Transportation needs:     Medical: Not on file     Non-medical: Not on file   Tobacco Use    Smoking status: Former Smoker     Packs/day: 1.00     Years: 20.00     Pack years: 20.00     Last attempt to quit: 1980     Years since quittin.6    Smokeless tobacco: Never Used   Substance and Sexual Activity    Alcohol use: No    Drug use: No    Sexual activity: Never   Lifestyle    Physical activity:     Days per week: Not on file     Minutes per session: Not on file    Stress: Not on file   Relationships    Social connections:     Talks on phone: Not on file     Gets together: Not on file     Attends Christian service: Not on file     Active member of club or organization: Not on file     Attends meetings of clubs or organizations: Not on file     Relationship status: Not on file   Other Topics Concern    Not on file   Social History Narrative    Not on file       Review of patient's allergies indicates:    Allergen Reactions    Fosinopril      Intolerance- elevates potassium level      Losartan      Intolerance- elevates potassium level         Current Outpatient Medications:     allopurinol (ZYLOPRIM) 100 MG tablet, TAKE 1 TABLET EVERY DAY, Disp: 90 tablet, Rfl: 3    bumetanide (BUMEX) 2 MG tablet, Take 2mg Once to twice daily or as directed, Disp: 180 tablet, Rfl: 3    carvedilol (COREG) 12.5 MG tablet, Take 1 tablet (12.5 mg total) by mouth 2 (two) times daily., Disp: 180 tablet, Rfl: 4    glimepiride (AMARYL) 1 MG tablet, Take 1 tablet (1 mg total) by mouth every morning., Disp: 90 tablet, Rfl: 3    isosorbide mononitrate (IMDUR) 120 MG 24 hr tablet, TAKE 1 TABLET EVERY DAY, Disp: 90 tablet, Rfl: 4    NIFEdipine (PROCARDIA-XL) 60 MG (OSM) 24 hr tablet, Take 1 tablet (60 mg total) by mouth once daily., Disp: 90 tablet, Rfl: 4    nitroGLYCERIN (NITROSTAT) 0.4 MG SL tablet, Place 1 tablet (0.4 mg total) under the tongue every 5 (five) minutes as needed. As needed for chest pain, Disp: 90 tablet, Rfl: 4    omega-3 acid ethyl esters (LOVAZA) 1 gram capsule, Take 2 capsules (2 g total) by mouth 2 (two) times daily., Disp: 360 capsule, Rfl: 5    rosuvastatin (CRESTOR) 40 MG Tab, Take 1 tablet (40 mg total) by mouth every evening., Disp: 90 tablet, Rfl: 3    warfarin (COUMADIN) 5 MG tablet, TAKE 1 & 1/2 TO 2 TABLETS (7.5 TO 10 MG TOTAL) BY  MOUTH DAILY AS DIRECTED BY COUMADIN CLINIC, Disp: 180 tablet, Rfl: 3    aspirin (ECOTRIN) 81 MG EC tablet, Take 1 tablet (81 mg total) by mouth once daily., Disp: , Rfl: 0    Current Facility-Administered Medications:     triamcinolone acetonide injection 40 mg, 40 mg, Other, 1 time in Clinic/HOD, Rupesh Norris Jr., MD        Review of Systems:  Constitutional: no fever or chills  ENT: no nasal congestion or sore throat  Respiratory: no cough or shortness of breath  Cardiovascular: no chest pain or palpitations  Gastrointestinal: no nausea or vomiting, PUD, GERD,  "NSAID intolerance  Genitourinary: no hematuria or dysuria  Integument/Breast: no rash or pruritis  Hematologic/Lymphatic: no easy bruising or lymphadenopathy  Musculoskeletal: see HPI  Neurological: no seizures or tremors  Behavioral/Psych: no auditory or visual hallucinations      Objective:      PHYSICAL EXAM:  Vitals:    05/08/20 0914   BP: (!) 133/58   Pulse: (!) 58   Weight: 85.7 kg (189 lb)   Height: 5' 6" (1.676 m)   PainSc: 0-No pain     General Appearance: WDWN, NAD  Neuro/Psych: Mood & affect appropriate  Lungs: Respirations equal and unlabored.   CV: No apparent CV dysfunction, distal pulses intact, RRR  Skin: Intact throughout  Extremities:   Right Hand Exam     Tenderness   The patient is experiencing no tenderness.     Tests   Tinel's sign (median nerve): positive    Other   Erythema: absent  Sensation: decreased  Pulse: present    Comments:  Severe thenar atrophy with poor opposition no hypothenar atrophy, he is able to make composite fist    Decreased sensation to light touch in the median distribution compared to the ulnar      Left Hand Exam     Tenderness   The patient is experiencing no tenderness.     Tests   Tinel's sign (median nerve): positive    Other   Erythema: absent  Sensation: decreased  Pulse: present    Comments:  No thenar or hypothenar atrophy noted, able to make composite fist  Sensation intact in the median radial ulnar distributions to light touch              DIAGNOSTICS/IMAGING:    Radiologist's Impression:     EXAMINATION:  XR HAND COMPLETE 3 VIEWS BILATERAL    CLINICAL HISTORY:  . Pain in right hand    TECHNIQUE:  PA, lateral, and oblique views of both hands were performed.    COMPARISON:  None.    FINDINGS:  The skeletal structures are intact with satisfactory overall alignment.  Mild to moderate osteoarthritis is present at multiple joints with narrowing and marginal spurring; these include 1st CMC joints at the wrist, left   3rd MCP joint, right 1st MCP joint, and many of " the IP joints of the digits.  No erosive change or focal soft tissue swelling is detected.  Incidental note is made of a small cystic area in the scaphoid on the right and a small soft tissue calcification next to the lunate on the left.    Soft tissues show prominent vascular calcifications at bilateral radial and ulnar arteries.  A 5 mm foreign body is noted in the thenar area on the left.    Comments: I have personnaly reviewed the imaging and I agree with the above radiologist's report.    I have explained the risks, benefits, and alternatives of the procedure in detail.  The patient voices understanding and all questions have been answered.  The patient agrees to proceed as planned. After a sterile prep of the skin the left carpal tunnel is injected from the volar approach using a 25 gauge needle with a combination of 1cc 1% plain xylocaine and 40 mg of Kenalog.  The patient is cautioned and immediate relief of pain is secondary to the local anesthetic and will be temporary.  After the anesthetic wears off there may be a increase in pain that may last for a few hours or a few days and they should use ice to help alleviate this flair up of pain.         Assessment:     Encounter Diagnoses   Name Primary?    Bilateral carpal tunnel syndrome     Bilateral hand pain     Severe carpal tunnel syndrome of right wrist Yes    Left carpal tunnel syndrome           Plan/Discussion:   Dariel was seen today for numbness and numbness.    Diagnoses and all orders for this visit:    Severe carpal tunnel syndrome of right wrist    Bilateral carpal tunnel syndrome  -     Ambulatory referral/consult to Hand Surgery    Bilateral hand pain    Left carpal tunnel syndrome    Other orders  -     triamcinolone acetonide injection 40 mg      Today Mr. Vieira was found to have bilateral carpal tunnel syndrome diagnosed by EMG as well as on exam he is noted to have severe right carpal tunnel syndrome with severe thenar atrophy.  I  discussed the pathophysiology progression treatment of carpal tunnel syndrome with the patient detail.  Because of his severe symptoms and findings on exam I suggested move forward with an open carpal tunnel release of the right hand to remove the pressure off of the median nerve and stop the progression of nerve loss.  The patient is currently on Coumadin therapy for his mechanical heart valve.  I discussed that because this is a minor procedure he may remain on his Coumadin therapy however I would like to ask if we can bring him closer to the minimum range allowed for his mechanical valve.  I discussed that I will send a message to Dr. Vences in regards to his dosing prior to surgery.  I discussed that there is a slightly higher bleeding risk due to the fact that he is on anticoagulation however because it is a minor procedure it is acceptable to continue the therapy.  I also discussed the possibility due to his severe carpal tunnel on the right that he may have worsen symptoms or failure to improve he also has underlying diabetes which contributes to peripheral neuropathy which could contribute to a decreased chance of improvement.  The patient understood this and he wished to move forward surgery we discussed performing this on May 19th.  In addition he was offered a corticosteroid injection to the left carpal tunnel to help alleviate his symptoms for the meantime.  After he recovers from the right carpal tunnel we may discussed performing a left carpal tunnel release in the future.  Patient agree with this above assessment plan all questions concerns were addressed prior to discharge

## 2020-05-08 NOTE — H&P (VIEW-ONLY)
Chief Complaint: Numbness of the Left Hand and Numbness of the Right Hand      HPI:    Dariel Urbina is a right hand dominant 79 y.o. male presenting today for bilateral carpal tunnel syndrome.  The patient states that over last year he has begun to notice near constant numbness within the thumb index and middle fingers of the right hand.  He also experiences numbness and tingling within the left hand.  His right hand is much worse.  He states that has become more difficult to perform daily activities such is is a bring his pants or buttoning his shirt.  He was recently seen by Dr. Hodgson at [facility] who performed an EMG which demonstrated severe bilateral carpal tunnel syndrome and evidence of underlying peripheral neuropathy.  [facility] treatment.  He denies any recent or previous history of trauma and has no other complaints today.    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   Prosthetic aortic valve.   Porcelain aorta.   Patent LIMA graft.    Bilateral carotid artery disease 2/9/2017    Bleeding from the nose     Bleeding nose 3/21/2018    Cataract     CKD (chronic kidney disease) stage 3, GFR 30-59 ml/min 5/27/2015    Claudication of left lower extremity 9/17/2014    Colon polyp     Gastroesophageal reflux disease without esophagitis 3/19/2018    Gastrointestinal hemorrhage associated with intestinal diverticulosis 4/1/2018    H/O mechanical aortic valve replacement 9/17/2014    History of gout 9/26/2012    Hyperparathyroidism due to renal insufficiency 7/27/2015    Internal hemorrhoid 4/3/2018    Long term current use of anticoagulant therapy 9/26/2012    Mechanical heart valve present     Metabolic acidosis with normal anion  gap and bicarbonate losses 3/20/2018    Mixed hyperlipidemia 9/26/2012    NSTEMI (non-ST elevated myocardial infarction) 3/21/2018    Obesity, diabetes, and hypertension syndrome 2/23/2016    PVD (peripheral vascular disease) 9/11/2012    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/2012    Type 2 diabetes mellitus with diabetic peripheral angiopathy without gangrene 5/27/2015    Type 2 diabetes mellitus with stage 3 chronic kidney disease, without long-term current use of insulin 10/2/2013       Past Surgical History:   Procedure Laterality Date    CARDIAC CATHETERIZATION      CARDIAC VALVE REPLACEMENT      CARDIAC VALVE SURGERY      COLON SURGERY      COLONOSCOPY N/A 3/31/2017    Procedure: COLONOSCOPY;  Surgeon: Bruno Raymond MD;  Location: [facility] ENDO (4TH FLR);  Service: Endoscopy;  Laterality: N/A;  Patient's wife requesting date.    COLONOSCOPY N/A 4/3/2018    Procedure: COLONOSCOPY;  Surgeon: Bonifacio Pelletier MD;  Location: [facility] ENDO (2ND FLR);  Service: Endoscopy;  Laterality: N/A;    COLONOSCOPY N/A 8/13/2018    Procedure: COLONOSCOPY;  Surgeon: Kam Barba MD;  Location: [facility] ENDO (2ND FLR);  Service: Endoscopy;  Laterality: N/A;  2nd floor: PA pressure 49; hx of moderate-severe valve disease     per Coumadin clinic-Patient can hold 5 days with lovenox bridge       ok to schedule per Katarina    CORONARY ANGIOPLASTY      CORONARY ARTERY BYPASS GRAFT      HERNIA REPAIR      SPINE SURGERY      VASECTOMY          Family History   Problem Relation Age of Onset    Heart failure Mother     Heart disease Mother     Heart failure Father     Heart disease Father     Alcohol abuse Father     Heart failure Brother     Heart disease Brother     Diabetes Brother     No Known Problems Sister     No Known Problems Maternal Grandmother     No Known Problems Maternal Grandfather     No Known Problems Paternal Grandmother     No Known Problems Paternal Grandfather     Heart disease Sister     No  Known Problems Maternal Aunt     No Known Problems Maternal Uncle     No Known Problems Paternal Aunt     No Known Problems Paternal Uncle     Amblyopia Neg Hx     Blindness Neg Hx     Cancer Neg Hx     Cataracts Neg Hx     Glaucoma Neg Hx     Hypertension Neg Hx     Macular degeneration Neg Hx     Retinal detachment Neg Hx     Strabismus Neg Hx     Stroke Neg Hx     Thyroid disease Neg Hx     Anemia Neg Hx     Arrhythmia Neg Hx     Asthma Neg Hx     Clotting disorder Neg Hx     Fainting Neg Hx     Heart attack Neg Hx     Hyperlipidemia Neg Hx     Atrial Septal Defect Neg Hx     Melanoma Neg Hx        Social History     Socioeconomic History    Marital status:      Spouse name: Ameena    Number of children: 3    Years of education: Not on file    Highest education level: Not on file   Occupational History    Occupation: retired   Social Needs    Financial resource strain: Not on file    Food insecurity:     Worry: Not on file     Inability: Not on file    Transportation needs:     Medical: Not on file     Non-medical: Not on file   Tobacco Use    Smoking status: Former Smoker     Packs/day: 1.00     Years: 20.00     Pack years: 20.00     Last attempt to quit: 1980     Years since quittin.6    Smokeless tobacco: Never Used   Substance and Sexual Activity    Alcohol use: No    Drug use: No    Sexual activity: Never   Lifestyle    Physical activity:     Days per week: Not on file     Minutes per session: Not on file    Stress: Not on file   Relationships    Social connections:     Talks on phone: Not on file     Gets together: Not on file     Attends Shinto service: Not on file     Active member of club or organization: Not on file     Attends meetings of clubs or organizations: Not on file     Relationship status: Not on file   Other Topics Concern    Not on file   Social History Narrative    Not on file       Review of patient's allergies indicates:    Allergen Reactions    Fosinopril      Intolerance- elevates potassium level      Losartan      Intolerance- elevates potassium level         Current Outpatient Medications:     allopurinol (ZYLOPRIM) 100 MG tablet, TAKE 1 TABLET EVERY DAY, Disp: 90 tablet, Rfl: 3    bumetanide (BUMEX) 2 MG tablet, Take 2mg Once to twice daily or as directed, Disp: 180 tablet, Rfl: 3    carvedilol (COREG) 12.5 MG tablet, Take 1 tablet (12.5 mg total) by mouth 2 (two) times daily., Disp: 180 tablet, Rfl: 4    glimepiride (AMARYL) 1 MG tablet, Take 1 tablet (1 mg total) by mouth every morning., Disp: 90 tablet, Rfl: 3    isosorbide mononitrate (IMDUR) 120 MG 24 hr tablet, TAKE 1 TABLET EVERY DAY, Disp: 90 tablet, Rfl: 4    NIFEdipine (PROCARDIA-XL) 60 MG (OSM) 24 hr tablet, Take 1 tablet (60 mg total) by mouth once daily., Disp: 90 tablet, Rfl: 4    nitroGLYCERIN (NITROSTAT) 0.4 MG SL tablet, Place 1 tablet (0.4 mg total) under the tongue every 5 (five) minutes as needed. As needed for chest pain, Disp: 90 tablet, Rfl: 4    omega-3 acid ethyl esters (LOVAZA) 1 gram capsule, Take 2 capsules (2 g total) by mouth 2 (two) times daily., Disp: 360 capsule, Rfl: 5    rosuvastatin (CRESTOR) 40 MG Tab, Take 1 tablet (40 mg total) by mouth every evening., Disp: 90 tablet, Rfl: 3    warfarin (COUMADIN) 5 MG tablet, TAKE 1 & 1/2 TO 2 TABLETS (7.5 TO 10 MG TOTAL) BY  MOUTH DAILY AS DIRECTED BY COUMADIN CLINIC, Disp: 180 tablet, Rfl: 3    aspirin (ECOTRIN) 81 MG EC tablet, Take 1 tablet (81 mg total) by mouth once daily., Disp: , Rfl: 0    Current Facility-Administered Medications:     triamcinolone acetonide injection 40 mg, 40 mg, Other, 1 time in Clinic/HOD, Rupesh Norris Jr., MD        Review of Systems:  Constitutional: no fever or chills  ENT: no nasal congestion or sore throat  Respiratory: no cough or shortness of breath  Cardiovascular: no chest pain or palpitations  Gastrointestinal: no nausea or vomiting, PUD, GERD,  "NSAID intolerance  Genitourinary: no hematuria or dysuria  Integument/Breast: no rash or pruritis  Hematologic/Lymphatic: no easy bruising or lymphadenopathy  Musculoskeletal: see HPI  Neurological: no seizures or tremors  Behavioral/Psych: no auditory or visual hallucinations      Objective:      PHYSICAL EXAM:  Vitals:    05/08/20 0914   BP: (!) 133/58   Pulse: (!) 58   Weight: 85.7 kg (189 lb)   Height: 5' 6" (1.676 m)   PainSc: 0-No pain     General Appearance: WDWN, NAD  Neuro/Psych: Mood & affect appropriate  Lungs: Respirations equal and unlabored.   CV: No apparent CV dysfunction, distal pulses intact, RRR  Skin: Intact throughout  Extremities:   Right Hand Exam     Tenderness   The patient is experiencing no tenderness.     Tests   Tinel's sign (median nerve): positive    Other   Erythema: absent  Sensation: decreased  Pulse: present    Comments:  Severe thenar atrophy with poor opposition no hypothenar atrophy, he is able to make composite fist    Decreased sensation to light touch in the median distribution compared to the ulnar      Left Hand Exam     Tenderness   The patient is experiencing no tenderness.     Tests   Tinel's sign (median nerve): positive    Other   Erythema: absent  Sensation: decreased  Pulse: present    Comments:  No thenar or hypothenar atrophy noted, able to make composite fist  Sensation intact in the median radial ulnar distributions to light touch              DIAGNOSTICS/IMAGING:    Radiologist's Impression:     EXAMINATION:  XR HAND COMPLETE 3 VIEWS BILATERAL    CLINICAL HISTORY:  . Pain in right hand    TECHNIQUE:  PA, lateral, and oblique views of both hands were performed.    COMPARISON:  None.    FINDINGS:  The skeletal structures are intact with satisfactory overall alignment.  Mild to moderate osteoarthritis is present at multiple joints with narrowing and marginal spurring; these include 1st CMC joints at the wrist, left   3rd MCP joint, right 1st MCP joint, and many of " the IP joints of the digits.  No erosive change or focal soft tissue swelling is detected.  Incidental note is made of a small cystic area in the scaphoid on the right and a small soft tissue calcification next to the lunate on the left.    Soft tissues show prominent vascular calcifications at bilateral radial and ulnar arteries.  A 5 mm foreign body is noted in the thenar area on the left.    Comments: I have personnaly reviewed the imaging and I agree with the above radiologist's report.    I have explained the risks, benefits, and alternatives of the procedure in detail.  The patient voices understanding and all questions have been answered.  The patient agrees to proceed as planned. After a sterile prep of the skin the left carpal tunnel is injected from the volar approach using a 25 gauge needle with a combination of 1cc 1% plain xylocaine and 40 mg of Kenalog.  The patient is cautioned and immediate relief of pain is secondary to the local anesthetic and will be temporary.  After the anesthetic wears off there may be a increase in pain that may last for a few hours or a few days and they should use ice to help alleviate this flair up of pain.         Assessment:     Encounter Diagnoses   Name Primary?    Bilateral carpal tunnel syndrome     Bilateral hand pain     Severe carpal tunnel syndrome of right wrist Yes    Left carpal tunnel syndrome           Plan/Discussion:   Dariel was seen today for numbness and numbness.    Diagnoses and all orders for this visit:    Severe carpal tunnel syndrome of right wrist    Bilateral carpal tunnel syndrome  -     Ambulatory referral/consult to Hand Surgery    Bilateral hand pain    Left carpal tunnel syndrome    Other orders  -     triamcinolone acetonide injection 40 mg      Today Mr. Vieira was found to have bilateral carpal tunnel syndrome diagnosed by EMG as well as on exam he is noted to have severe right carpal tunnel syndrome with severe thenar atrophy.  I  discussed the pathophysiology progression treatment of carpal tunnel syndrome with the patient detail.  Because of his severe symptoms and findings on exam I suggested move forward with an open carpal tunnel release of the right hand to remove the pressure off of the median nerve and stop the progression of nerve loss.  The patient is currently on Coumadin therapy for his mechanical heart valve.  I discussed that because this is a minor procedure he may remain on his Coumadin therapy however I would like to ask if we can bring him closer to the minimum range allowed for his mechanical valve.  I discussed that I will send a message to Dr. Vences in regards to his dosing prior to surgery.  I discussed that there is a slightly higher bleeding risk due to the fact that he is on anticoagulation however because it is a minor procedure it is acceptable to continue the therapy.  I also discussed the possibility due to his severe carpal tunnel on the right that he may have worsen symptoms or failure to improve he also has underlying diabetes which contributes to peripheral neuropathy which could contribute to a decreased chance of improvement.  The patient understood this and he wished to move forward surgery we discussed performing this on May 19th.  In addition he was offered a corticosteroid injection to the left carpal tunnel to help alleviate his symptoms for the meantime.  After he recovers from the right carpal tunnel we may discussed performing a left carpal tunnel release in the future.  Patient agree with this above assessment plan all questions concerns were addressed prior to discharge

## 2020-05-08 NOTE — PROGRESS NOTES
5/8 - received message from surgeon that patient is to have simple carpel tunnel release procedure on 5/19. MD would like to target low end of INR goal (does not necessarily need to stop warfarin). Will repeat INR 5/14 to prepare.

## 2020-05-14 ENCOUNTER — ANTI-COAG VISIT (OUTPATIENT)
Dept: CARDIOLOGY | Facility: CLINIC | Age: 80
End: 2020-05-14
Payer: MEDICARE

## 2020-05-14 ENCOUNTER — LAB VISIT (OUTPATIENT)
Dept: LAB | Facility: HOSPITAL | Age: 80
End: 2020-05-14
Attending: INTERNAL MEDICINE
Payer: MEDICARE

## 2020-05-14 DIAGNOSIS — Z95.2 H/O MECHANICAL AORTIC VALVE REPLACEMENT: ICD-10-CM

## 2020-05-14 DIAGNOSIS — Z79.01 LONG TERM CURRENT USE OF ANTICOAGULANT THERAPY: Primary | ICD-10-CM

## 2020-05-14 DIAGNOSIS — Z79.01 LONG TERM CURRENT USE OF ANTICOAGULANT THERAPY: ICD-10-CM

## 2020-05-14 LAB
INR PPP: 2.4 (ref 0.8–1.2)
PROTHROMBIN TIME: 22.6 SEC (ref 9–12.5)

## 2020-05-14 PROCEDURE — 85610 PROTHROMBIN TIME: CPT | Mod: HCNC

## 2020-05-14 PROCEDURE — 93793 PR ANTICOAGULANT MGMT FOR PT TAKING WARFARIN: ICD-10-PCS | Mod: S$GLB,,,

## 2020-05-14 PROCEDURE — 36415 COLL VENOUS BLD VENIPUNCTURE: CPT | Mod: HCNC

## 2020-05-14 PROCEDURE — 93793 ANTICOAG MGMT PT WARFARIN: CPT | Mod: S$GLB,,,

## 2020-05-14 NOTE — PROGRESS NOTES
INR not at goal. Medications, chart, and patient findings reviewed. See calendar for adjustments to dose and follow up plan.  Pt has carpel tunnel release scheduled 5/19.  Low target INR requested.

## 2020-05-18 ENCOUNTER — ANESTHESIA EVENT (OUTPATIENT)
Dept: SURGERY | Facility: OTHER | Age: 80
End: 2020-05-18
Payer: MEDICARE

## 2020-05-18 ENCOUNTER — TELEPHONE (OUTPATIENT)
Dept: ORTHOPEDICS | Facility: CLINIC | Age: 80
End: 2020-05-18

## 2020-05-18 ENCOUNTER — LAB VISIT (OUTPATIENT)
Dept: INTERNAL MEDICINE | Facility: CLINIC | Age: 80
End: 2020-05-18
Payer: MEDICARE

## 2020-05-18 DIAGNOSIS — Z01.818 PREOP EXAMINATION: ICD-10-CM

## 2020-05-18 DIAGNOSIS — Z79.01 LONG TERM CURRENT USE OF ANTICOAGULANT THERAPY: ICD-10-CM

## 2020-05-18 DIAGNOSIS — Z95.2 H/O MECHANICAL AORTIC VALVE REPLACEMENT: ICD-10-CM

## 2020-05-18 LAB — SARS-COV-2 RNA RESP QL NAA+PROBE: NOT DETECTED

## 2020-05-18 PROCEDURE — U0003 INFECTIOUS AGENT DETECTION BY NUCLEIC ACID (DNA OR RNA); SEVERE ACUTE RESPIRATORY SYNDROME CORONAVIRUS 2 (SARS-COV-2) (CORONAVIRUS DISEASE [COVID-19]), AMPLIFIED PROBE TECHNIQUE, MAKING USE OF HIGH THROUGHPUT TECHNOLOGIES AS DESCRIBED BY CMS-2020-01-R: HCPCS | Mod: HCNC

## 2020-05-18 RX ORDER — WARFARIN SODIUM 5 MG/1
5 TABLET ORAL DAILY
Qty: 45 TABLET | Refills: 3 | Status: SHIPPED | OUTPATIENT
Start: 2020-05-18 | End: 2020-05-21

## 2020-05-18 RX ORDER — WARFARIN SODIUM 5 MG/1
TABLET ORAL
Qty: 180 TABLET | Refills: 3 | Status: ON HOLD | OUTPATIENT
Start: 2020-05-18 | End: 2021-10-04 | Stop reason: SDUPTHER

## 2020-05-18 NOTE — TELEPHONE ENCOUNTER
Informed patient of arrival time for surgery (0500), location, and scheduled patient for 2 week post op visit. Patient verbalized understanding.

## 2020-05-18 NOTE — PRE ADMISSION SCREENING
Pre admit phone call completed.    Instructions given to patient about NPO status as follows:     The evening before surgery do not eat anything after 9 p.m. ( this includes hard candy, chewing gum and mints).  You may only have GATORADE, POWERADE AND WATER from 9 p.m. until you leave your home. DO NOT  DRINK ANY LIQUIDS ON THE WAY TO THE HOSPITAL.      Patient was also instructed on the below information:    Park in the Parking lot behind the hospital or in the [employer] [facility] across the street from the parking lot.  Parking is complimentary.  If you will be discharged the same day as your procedure, please arrange for a responsible adult to drive you home or  to accompany you if traveling by taxi.  YOU WILL NOT BE PERMITTED TO DRIVE OR TO LEAVE THE HOSPITAL ALONE AFTER SURGERY.  It is strongly recommended that you arrange for someone to remain with you for the first 24 hrs following your surgery.    Patient verbalized understanding of above instructions.

## 2020-05-19 ENCOUNTER — ANESTHESIA (OUTPATIENT)
Dept: SURGERY | Facility: OTHER | Age: 80
End: 2020-05-19
Payer: MEDICARE

## 2020-05-19 ENCOUNTER — HOSPITAL ENCOUNTER (OUTPATIENT)
Facility: OTHER | Age: 80
Discharge: HOME | End: 2020-05-19
Attending: PLASTIC SURGERY | Admitting: PLASTIC SURGERY
Payer: MEDICARE

## 2020-05-19 VITALS
HEART RATE: 61 BPM | DIASTOLIC BLOOD PRESSURE: 58 MMHG | SYSTOLIC BLOOD PRESSURE: 143 MMHG | RESPIRATION RATE: 16 BRPM | OXYGEN SATURATION: 95 % | WEIGHT: 189 LBS | TEMPERATURE: 98 F | HEIGHT: 66 IN | BODY MASS INDEX: 30.37 KG/M2

## 2020-05-19 DIAGNOSIS — G56.01 SEVERE CARPAL TUNNEL SYNDROME OF RIGHT WRIST: Primary | ICD-10-CM

## 2020-05-19 LAB — POCT GLUCOSE: 102 MG/DL (ref 70–110)

## 2020-05-19 PROCEDURE — 25000003 PHARM REV CODE 250: Mod: HCNC | Performed by: PLASTIC SURGERY

## 2020-05-19 PROCEDURE — 36000706: Mod: HCNC | Performed by: PLASTIC SURGERY

## 2020-05-19 PROCEDURE — 63600175 PHARM REV CODE 636 W HCPCS: Mod: HCNC | Performed by: ANESTHESIOLOGY

## 2020-05-19 PROCEDURE — 64721 PR REVISE MEDIAN N/CARPAL TUNNEL SURG: ICD-10-PCS | Mod: HCNC,RT,, | Performed by: PLASTIC SURGERY

## 2020-05-19 PROCEDURE — 64721 CARPAL TUNNEL SURGERY: CPT | Mod: HCNC,RT,, | Performed by: PLASTIC SURGERY

## 2020-05-19 PROCEDURE — 37000008 HC ANESTHESIA 1ST 15 MINUTES: Mod: HCNC | Performed by: PLASTIC SURGERY

## 2020-05-19 PROCEDURE — 63600175 PHARM REV CODE 636 W HCPCS: Mod: HCNC | Performed by: PLASTIC SURGERY

## 2020-05-19 PROCEDURE — 63600175 PHARM REV CODE 636 W HCPCS: Mod: HCNC | Performed by: NURSE ANESTHETIST, CERTIFIED REGISTERED

## 2020-05-19 PROCEDURE — 71000016 HC POSTOP RECOV ADDL HR: Mod: HCNC | Performed by: PLASTIC SURGERY

## 2020-05-19 PROCEDURE — 82962 GLUCOSE BLOOD TEST: CPT | Mod: HCNC | Performed by: PLASTIC SURGERY

## 2020-05-19 PROCEDURE — 71000015 HC POSTOP RECOV 1ST HR: Mod: HCNC | Performed by: PLASTIC SURGERY

## 2020-05-19 PROCEDURE — 36000707: Mod: HCNC | Performed by: PLASTIC SURGERY

## 2020-05-19 PROCEDURE — 37000009 HC ANESTHESIA EA ADD 15 MINS: Mod: HCNC | Performed by: PLASTIC SURGERY

## 2020-05-19 RX ORDER — MUPIROCIN 20 MG/G
OINTMENT TOPICAL 2 TIMES DAILY
Status: DISCONTINUED | OUTPATIENT
Start: 2020-05-19 | End: 2020-05-19 | Stop reason: HOSPADM

## 2020-05-19 RX ORDER — OXYCODONE HYDROCHLORIDE 5 MG/1
5 TABLET ORAL
Status: DISCONTINUED | OUTPATIENT
Start: 2020-05-19 | End: 2020-05-19 | Stop reason: HOSPADM

## 2020-05-19 RX ORDER — HYDROMORPHONE HYDROCHLORIDE 2 MG/ML
0.2 INJECTION, SOLUTION INTRAMUSCULAR; INTRAVENOUS; SUBCUTANEOUS EVERY 5 MIN PRN
Status: DISCONTINUED | OUTPATIENT
Start: 2020-05-19 | End: 2020-05-19 | Stop reason: HOSPADM

## 2020-05-19 RX ORDER — HYDROCODONE BITARTRATE AND ACETAMINOPHEN 5; 325 MG/1; MG/1
1 TABLET ORAL EVERY 4 HOURS PRN
Status: DISCONTINUED | OUTPATIENT
Start: 2020-05-19 | End: 2020-05-19 | Stop reason: HOSPADM

## 2020-05-19 RX ORDER — SODIUM CHLORIDE, SODIUM LACTATE, POTASSIUM CHLORIDE, CALCIUM CHLORIDE 600; 310; 30; 20 MG/100ML; MG/100ML; MG/100ML; MG/100ML
INJECTION, SOLUTION INTRAVENOUS CONTINUOUS
Status: DISCONTINUED | OUTPATIENT
Start: 2020-05-19 | End: 2020-05-19 | Stop reason: HOSPADM

## 2020-05-19 RX ORDER — LIDOCAINE HYDROCHLORIDE 20 MG/ML
INJECTION INTRAVENOUS
Status: DISCONTINUED | OUTPATIENT
Start: 2020-05-19 | End: 2020-05-19

## 2020-05-19 RX ORDER — LIDOCAINE HYDROCHLORIDE AND EPINEPHRINE 10; 10 MG/ML; UG/ML
INJECTION, SOLUTION INFILTRATION; PERINEURAL
Status: DISCONTINUED | OUTPATIENT
Start: 2020-05-19 | End: 2020-05-19 | Stop reason: HOSPADM

## 2020-05-19 RX ORDER — PROPOFOL 10 MG/ML
VIAL (ML) INTRAVENOUS CONTINUOUS PRN
Status: DISCONTINUED | OUTPATIENT
Start: 2020-05-19 | End: 2020-05-19

## 2020-05-19 RX ORDER — CEFAZOLIN SODIUM 1 G/3ML
2 INJECTION, POWDER, FOR SOLUTION INTRAMUSCULAR; INTRAVENOUS
Status: COMPLETED | OUTPATIENT
Start: 2020-05-19 | End: 2020-05-19

## 2020-05-19 RX ORDER — SODIUM CHLORIDE 0.9 % (FLUSH) 0.9 %
10 SYRINGE (ML) INJECTION
Status: DISCONTINUED | OUTPATIENT
Start: 2020-05-19 | End: 2020-05-19 | Stop reason: HOSPADM

## 2020-05-19 RX ORDER — FENTANYL CITRATE 50 UG/ML
INJECTION, SOLUTION INTRAMUSCULAR; INTRAVENOUS
Status: DISCONTINUED | OUTPATIENT
Start: 2020-05-19 | End: 2020-05-19

## 2020-05-19 RX ORDER — SODIUM CHLORIDE 9 MG/ML
INJECTION, SOLUTION INTRAVENOUS CONTINUOUS
Status: DISCONTINUED | OUTPATIENT
Start: 2020-05-19 | End: 2020-05-19 | Stop reason: HOSPADM

## 2020-05-19 RX ORDER — BUPIVACAINE HYDROCHLORIDE 2.5 MG/ML
INJECTION, SOLUTION EPIDURAL; INFILTRATION; INTRACAUDAL
Status: DISCONTINUED | OUTPATIENT
Start: 2020-05-19 | End: 2020-05-19 | Stop reason: HOSPADM

## 2020-05-19 RX ORDER — HYDROCODONE BITARTRATE AND ACETAMINOPHEN 5; 325 MG/1; MG/1
1 TABLET ORAL EVERY 6 HOURS PRN
Qty: 20 TABLET | Refills: 0 | Status: SHIPPED | OUTPATIENT
Start: 2020-05-19 | End: 2020-06-22

## 2020-05-19 RX ORDER — MIDAZOLAM HYDROCHLORIDE 1 MG/ML
INJECTION INTRAMUSCULAR; INTRAVENOUS
Status: DISCONTINUED | OUTPATIENT
Start: 2020-05-19 | End: 2020-05-19

## 2020-05-19 RX ORDER — SODIUM CHLORIDE 0.9 % (FLUSH) 0.9 %
3 SYRINGE (ML) INJECTION
Status: DISCONTINUED | OUTPATIENT
Start: 2020-05-19 | End: 2020-05-19 | Stop reason: HOSPADM

## 2020-05-19 RX ADMIN — PROPOFOL 50 MCG/KG/MIN: 10 INJECTION, EMULSION INTRAVENOUS at 07:05

## 2020-05-19 RX ADMIN — CEFAZOLIN 2 G: 330 INJECTION, POWDER, FOR SOLUTION INTRAMUSCULAR; INTRAVENOUS at 07:05

## 2020-05-19 RX ADMIN — FENTANYL CITRATE 50 MCG: 50 INJECTION, SOLUTION INTRAMUSCULAR; INTRAVENOUS at 07:05

## 2020-05-19 RX ADMIN — LIDOCAINE HYDROCHLORIDE 50 MG: 20 INJECTION, SOLUTION INTRAVENOUS at 07:05

## 2020-05-19 RX ADMIN — SODIUM CHLORIDE, SODIUM LACTATE, POTASSIUM CHLORIDE, AND CALCIUM CHLORIDE: 600; 310; 30; 20 INJECTION, SOLUTION INTRAVENOUS at 06:05

## 2020-05-19 RX ADMIN — MIDAZOLAM HYDROCHLORIDE 1 MG: 1 INJECTION, SOLUTION INTRAMUSCULAR; INTRAVENOUS at 06:05

## 2020-05-19 NOTE — ANESTHESIA POSTPROCEDURE EVALUATION
Anesthesia Post Evaluation    Patient: Dariel Urbina    Procedure(s) Performed: Procedure(s) (LRB):  RELEASE, CARPAL TUNNEL (Right)    Final Anesthesia Type: general    Patient location during evaluation: OPS  Patient participation: Yes- Able to Participate  Level of consciousness: awake and alert and oriented  Post-procedure vital signs: reviewed and stable  Pain management: adequate  Airway patency: patent    PONV status at discharge: No PONV  Anesthetic complications: no      Cardiovascular status: stable  Respiratory status: unassisted, room air and spontaneous ventilation  Hydration status: euvolemic  Follow-up not needed.          Vitals Value Taken Time   /65 5/19/2020  5:44 AM   Temp 36.4 °C (97.5 °F) 5/19/2020  5:44 AM   Pulse 50 5/19/2020  5:44 AM   Resp 18 5/19/2020  5:44 AM   SpO2 95 % 5/19/2020  5:44 AM         No case tracking events are documented in the log.      Pain/Sanjeev Score: No data recorded

## 2020-05-19 NOTE — ANESTHESIA PREPROCEDURE EVALUATION
05/19/2020  Dariel Urbina is a 79 y.o., male.    Anesthesia Evaluation    I have reviewed the Patient Summary Reports.    I have reviewed the Nursing Notes.   I have reviewed the Medications.     Review of Systems  Anesthesia Hx:  Denies Family Hx of Anesthesia complications.   Denies Personal Hx of Anesthesia complications.   Social:  Non-Smoker    Hematology/Oncology:     Oncology Normal    -- Anemia:   EENT/Dental:EENT/Dental Normal   Cardiovascular:   Hypertension Past MI CABG/stent (CABG/AVR 2000)  Angina PVD hyperlipidemia ECG has been reviewed. Echo: 3/20  Left Ventricle Mild decreased ejection fraction at 45%. The quantitatively dervived ejection fraction is 44%. Normal left ventricular cavity size. Normal wall thickness observed. Grade II (moderate) left ventricular diastolic dysfunction consistent with pseudonormalization. Elevated left atrial pressure. Global hypokinesis.  Right Ventricle Normal cavity size and systolic function.  Left Atrium There is severe left atrial enlargement. Left atrial volume index is 84.6 mL/m2.  Right Atrium There is normal right atrial size.  Aortic Valve Trace regurgitation. There is a mechanical valve present. The LVOT diameter is 2.00 cm.  Mitral Valve The mitral valve appears structurally normal. There is normal leaflet mobility. Moderate mitral sclerosis. There is mild mitral annular calcification. Mild to moderate regurgitation.  Tricuspid Valve The tricuspid valve appears structurally normal. Mild regurgitation. Pulmonary hypertension present. There is normal leaflet mobility. The estimated PA systolic pressure is 50 mmHg.  Pulmonic Valve Normal valve structure. Trace regurgitation. There is normal leaflet mobility.  IVC/SVC Normal central venous pressure (3 mm Hg).  Ascending Aorta The aortic root and ascending aorta are normal in size.  Pericardium No  pericardial effusion.     Pulmonary:   Shortness of breath    Renal/:   Chronic Renal Disease, CRI    Hepatic/GI:   GERD    Musculoskeletal:  Spine Disorders: lumbar    Neurological:   Carotid dz, denies CVA  Peripheral Neuropathy    Endocrine:   Diabetes, type 2 hyperparathyroid   Dermatological:  Skin Normal    Psych:  Psychiatric Normal           Physical Exam  General:  Well nourished    Airway/Jaw/Neck:  Airway Findings: Mouth Opening: Normal Tongue: Normal  General Airway Assessment: Adult  Mallampati: II  TM Distance: Normal, at least 6 cm  Jaw/Neck Findings:  Neck ROM: Normal ROM      Dental:  Dental Findings: In tact, Periodontal disease, Mild         Mental Status:  Mental Status Findings:  Cooperative, Alert and Oriented         Anesthesia Plan  Type of Anesthesia, risks & benefits discussed:  Anesthesia Type:  general  Patient's Preference:   Intra-op Monitoring Plan: standard ASA monitors  Intra-op Monitoring Plan Comments:   Post Op Pain Control Plan: per primary service following discharge from PACU  Post Op Pain Control Plan Comments:   Induction:    Beta Blocker:         Informed Consent: Patient understands risks and agrees with Anesthesia plan.  Questions answered. Anesthesia consent signed with patient.  ASA Score: 4     Day of Surgery Review of History & Physical:    H&P update referred to the surgeon.     Anesthesia Plan Notes: IV propofol for local injection by surgeon          Ready For Surgery From Anesthesia Perspective.

## 2020-05-19 NOTE — DISCHARGE INSTRUCTIONS
Anesthesia: Monitored Anesthesia Care (MAC)    Youre due to have surgery. During surgery, youll be given medicine called anesthesia. This will keep you comfortable and pain-free. Your surgeon will use monitored anesthesia care (MAC). This sheet tells you more about this type of anesthesia.  What is monitored anesthesia care?  MAC keeps you very drowsy during surgery. You may be awake, but you will likely not remember much. And you wont feel pain. With MAC, medicines are given through an IV line into a vein in your arm or hand. A local anesthetic will usually be injected into the skin and muscle around the surgical site to numb it. The anesthesia provider monitors you during the procedure. He or she checks your heart rate and rhythm, blood pressure, and blood oxygen level.  Anesthesia tools and medicines that may be near you during your procedure  You will likely have:  · A pulse oximeter on the end of your finger. This measures your blood oxygen level.  · Electrocardiography leads (electrodes) on your chest. These record your heart rate and rhythm.  · Medicines given through an IV. These relax you and prevent pain. You may be awake or sleep lightly. If you have local anesthetic, it is injected directly into your skin.  · A facemask to give you oxygen, if needed.  Risks and possible complications  MAC has some risks. These include:  · Breathing problems  · Nausea and vomiting  · Allergic reaction to the anesthetic    Anesthesia safety  Tips for anesthesia safety include the following:   · Follow all instructions you are given for how long not to eat or drink before your procedure.  · Be sure your healthcare provider knows what medicines you take, especially any anti-inflammatory medicine or blood thinners. This includes aspirin and any other over-the-counter medicines, herbs, and supplements.  · Have an adult family member or friend drive you home after the procedure.  · For the first 24 hours after your  surgery:  ¨ Do not drive or use heavy equipment.  ¨ Do not make important decisions or sign documents.  ¨ Avoid alcohol.  ¨ Have someone stay with you, if possible. They can watch for problems and help keep you safe.  Date Last Reviewed: 12/1/2016  © 6053-4579 [employer]. [address] [place] [address]. All rights reserved. This information is not intended as a substitute for professional medical care. Always follow your healthcare professional's instructions.        Discharge Instructions for Carpal Tunnel Release  You had a carpal tunnel release procedure to help relieve the symptoms of carpal tunnel syndrome. In carpal tunnel syndrome, a nerve in the wrist is compressed and irritated. This causes numbness and pain in the fingers and hand. Carpal tunnel release relieves the compression of the nerve. Here are instructions that will help you care for your arm and wrist when you are at home.  Home care  · Avoid gripping objects tightly or lifting with your affected arm.  · Wear your bandage, splint, or cast as directed by your doctor.  · Always keep the dressing, splint, or cast dry and clean.  · When showering, cover your hand and  wrist with plastic and use tape or rubberbands to keep the dressing, splint, or cast dry. Shower as necessary.    · Use an ice pack or bag of frozen peas -- or something similar -- wrapped in a thin towel on your wrist to reduce swelling for the first 48 hours. Leave the ice pack on for 20 minutes; then take it off for 20 minutes. Repeat as needed.  · Keep your arm elevated above your heart for 24 to 48 hours after surgery.  · Do the exercises you learned in the hospital, or as instructed by your doctor.  · Take pain medicine as directed.  · Dont drive until your doctor says its OK. Never drive while you are taking opioid pain medicine.  · Ask your doctor when you can return to work. If your job requires heavy lifting, you may not be able to begin working again  for several weeks.  Follow-up care  Make a follow-up appointment as directed by your doctor.     When to seek medical care  Call 911 right away if you have any of the following:  · Chest pain  · Shortness of breath  Otherwise, call your doctor immediately if you have any of the following:  · A splint, cast, or dressing that has gotten wet  · Increased bleeding or drainage from the incision (cut)  · Opening of the incision  · Fever above 100.4°F (38.9°C) taken by mouth, or shaking chills  · Any new numbness in the fingers or thumb  · Blue hand or fingers  · Increased pain with or without activity  · Increased redness, tenderness, or swelling of the incision   Date Last Reviewed: 11/15/2015  © 1294-1549 [employer]. [address] [place] [address], [place] [address]. All rights reserved. This information is not intended as a substitute for professional medical care. Always follow your healthcare professional's instructions.

## 2020-05-19 NOTE — INTERVAL H&P NOTE
The patient has been examined and the H&P has been reviewed:    I concur with the findings and no changes have occurred since H&P was written.    Anesthesia/Surgery risks, benefits and alternative options discussed and understood by patient/family.          Active Hospital Problems    Diagnosis  POA    Severe carpal tunnel syndrome of right wrist [G56.01]  Yes      Resolved Hospital Problems   No resolved problems to display.

## 2020-05-19 NOTE — OP NOTE
DATE OF PROCEDURE:   05/19/2020     PREOPERATIVE DIAGNOSIS:   severe right carpal tunnel syndrome.     POSTOPERATIVE DIAGNOSIS:   severe right carpal tunnel syndrome.     PROCEDURE:   right open carpal tunnel release.     SURGEON:  Rupesh Norris Jr., M.D.     ANESTHESIA:  Local with MAC.     FLUIDS:  300 mL crystalloid.     ESTIMATED BLOOD LOSS:  Minimal.     SPECIMENS:  None.     FINDINGS:  Tight transverse retinacular ligament of the right carpal tunnel.     IMPLANTS AND GRAFTS:  None.     COMPLICATIONS:  None.     DISPOSITION:  To PACU, then home.     INDICATIONS:   Dariel Urbina is a 79 y.o. male who presented   with longstanding history of right hand numbness and tingling.  An EMG was found to have profound right carpal tunnel syndrome.  I suggested would be in the patient's best interest to move forward with a right open carpal tunnel release.  We discussed the complications due to his anticoagulation because of a mechanical valve.  I discussed that I would like to have his INR just that prior to surgery to decrease his chance of bleeding.  After discussing his patient wished to move forward.    we additionally discussed  risks, benefits and alternatives in detail, he wished to proceed and informed   consent was obtained and the patient was then scheduled for the procedure.     PROCEDURE IN DETAIL:  After proper identification of Dariel Urbina   in the   preoperative area, the patient was wheeled to the Operating Room on a hospital   stretcher.  Pressure points were padded and checked this time.  A timeout was   called when surgeons, nurses and Anesthesia agreed upon the patient and   procedure.  He was given Ancef for prophylactic antibiotics.  A padded 18-inch   tourniquet was then placed to right forearm and the upper extremity was then   blocked at the carpal tunnel using 1% lidocaine with epinephrine.  The lidocaine with epinephrine was allowed to remain in place for approximately 15 min  prior to prepping and draping.   Next, the   hand was then prepped and draped in usual sterile fashion.  An Esmarch   tourniquet was used to exsanguinate the hand and tourniquet was inflated to 250   mmHg.  A longitudinal incision was made through skin, dermis and subcutaneous   tissue using a #15 blade scalpel. The incision was then carried through the   palmaris fascia to the flexor retinacular ligament.  The ligament was then   divided under direct visualization and antebrachial fasciotomy was performed   using Littler scissors in a distal to proximal direction under direct   visualization.  I then inspected the median nerve, which was in continuity and   uninjured.  I then digitally palpated through the carpal tunnel and felt that   the carpal tunnel was adequately released.  The tourniquet was then released at   this time and hemostasis was achieved using electrocautery and the wound was   irrigated with copious amounts of normal saline.  It was then closed using 4-0   nylon sutures in interrupted fashion.  Wound was then cleaned and dried.  A   0.25% Marcaine was injected around the operative site for postoperative   anesthesia.  It was then dressed with Xeroform and a dry sterile dressing   followed by a volar splint with the wrist in neutral and fingers free.  This   concluded the procedure.  The patient tolerated the procedure well without any   complications.  At the end of the case, needle and sponge counts were correct   x2, and I was present and scrubbed all aspects of procedure.  The patient was then taken   back for further recovery.

## 2020-05-19 NOTE — BRIEF OP NOTE
[facility]  Brief Operative Note    Surgery Date: 5/19/2020     Surgeon(s) and Role:     * Rupesh Norris Jr., MD - Primary    Assisting Surgeon: None    Pre-op Diagnosis:  Severe carpal tunnel syndrome of right wrist [G56.01]    Post-op Diagnosis:  Post-Op Diagnosis Codes:     * Severe carpal tunnel syndrome of right wrist [G56.01]    Procedure(s) (LRB):  RELEASE, CARPAL TUNNEL (Right)    Anesthesia: Local MAC    Description of the findings of the procedure(s): severe right carpal tunnel syndrome    Estimated Blood Loss: * No values recorded between 5/19/2020  7:29 AM and 5/19/2020  7:50 AM *         Specimens:   Specimen (12h ago, onward)    None            Discharge Note    OUTCOME: Patient tolerated treatment/procedure well without complication and is now ready for discharge.    DISPOSITION: Home or Self Care    FINAL DIAGNOSIS:  Severe carpal tunnel syndrome of right wrist    FOLLOWUP: In clinic    DISCHARGE INSTRUCTIONS:    Discharge Procedure Orders   Diet general     Call MD for:  temperature >100.4     Call MD for:  persistent nausea and vomiting     Call MD for:  severe uncontrolled pain     Call MD for:  difficulty breathing, headache or visual disturbances     Call MD for:  redness, tenderness, or signs of infection (pain, swelling, redness, odor or green/yellow discharge around incision site)     Call MD for:  hives     Call MD for:  persistent dizziness or light-headedness     Call MD for:  extreme fatigue     Keep surgical extremity elevated     Lifting restrictions     No driving, operating heavy equipment or signing legal documents while taking pain medication.     Change dressing (specify)   Order Comments: Remove splint and dressings in one week

## 2020-05-19 NOTE — PLAN OF CARE
Dariel Devon Urbina has met all discharge criteria from Phase II. Vital Signs are stable, ambulating  without difficulty. Discharge instructions given, patient verbalized understanding. Discharged from facility via wheelchair in stable condition.

## 2020-05-21 RX ORDER — WARFARIN SODIUM 5 MG/1
TABLET ORAL
Qty: 143 TABLET | Refills: 0 | Status: ON HOLD | OUTPATIENT
Start: 2020-05-21 | End: 2021-10-04 | Stop reason: SDUPTHER

## 2020-05-26 ENCOUNTER — LAB VISIT (OUTPATIENT)
Dept: LAB | Facility: HOSPITAL | Age: 80
End: 2020-05-26
Attending: INTERNAL MEDICINE
Payer: MEDICARE

## 2020-05-26 ENCOUNTER — ANTI-COAG VISIT (OUTPATIENT)
Dept: CARDIOLOGY | Facility: CLINIC | Age: 80
End: 2020-05-26
Payer: MEDICARE

## 2020-05-26 DIAGNOSIS — Z79.01 LONG TERM CURRENT USE OF ANTICOAGULANT THERAPY: ICD-10-CM

## 2020-05-26 DIAGNOSIS — Z95.2 H/O MECHANICAL AORTIC VALVE REPLACEMENT: ICD-10-CM

## 2020-05-26 LAB
INR PPP: 1.7 (ref 0.8–1.2)
PROTHROMBIN TIME: 16.9 SEC (ref 9–12.5)

## 2020-05-26 PROCEDURE — 93793 ANTICOAG MGMT PT WARFARIN: CPT | Mod: S$GLB,,,

## 2020-05-26 PROCEDURE — 36415 COLL VENOUS BLD VENIPUNCTURE: CPT | Mod: HCNC

## 2020-05-26 PROCEDURE — 85610 PROTHROMBIN TIME: CPT | Mod: HCNC

## 2020-05-26 PROCEDURE — 93793 PR ANTICOAGULANT MGMT FOR PT TAKING WARFARIN: ICD-10-PCS | Mod: S$GLB,,,

## 2020-05-26 NOTE — PROGRESS NOTES
INR not at goal. Medications, chart, and patient findings reviewed. See calendar for adjustments to dose and follow up plan.  Wife advised & indicated understanding.   Patient was re-educated on situations that would require placing a call to the [facility], including bleeding or unusual bruising issues, changes in health, diet or medications,upcoming procedures that require warfarin interruption, and missed Coumadin dose(s). Patient expressed understanding that avoidance of consistency with these parameters could cause fluctuations in INR, leading to more frequent visits and increase risk of adverse events.

## 2020-05-26 NOTE — PROGRESS NOTES
Spoke with pt wife, states the patient had surgery a week ago,5/19/2020. Pt wife denies any changes. Pt wife confirmed doses.

## 2020-06-01 ENCOUNTER — NURSE TRIAGE (OUTPATIENT)
Dept: ADMINISTRATIVE | Facility: CLINIC | Age: 80
End: 2020-06-01

## 2020-06-03 ENCOUNTER — OFFICE VISIT (OUTPATIENT)
Dept: ORTHOPEDICS | Facility: CLINIC | Age: 80
End: 2020-06-03
Payer: MEDICARE

## 2020-06-03 VITALS
WEIGHT: 189 LBS | HEART RATE: 65 BPM | BODY MASS INDEX: 30.37 KG/M2 | HEIGHT: 66 IN | DIASTOLIC BLOOD PRESSURE: 81 MMHG | SYSTOLIC BLOOD PRESSURE: 149 MMHG

## 2020-06-03 DIAGNOSIS — Z98.890 STATUS POST SURGERY: Primary | ICD-10-CM

## 2020-06-03 PROCEDURE — 99999 PR PBB SHADOW E&M-EST. PATIENT-LVL III: CPT | Mod: PBBFAC,HCNC,, | Performed by: PLASTIC SURGERY

## 2020-06-03 PROCEDURE — 99024 POSTOP FOLLOW-UP VISIT: CPT | Mod: HCNC,S$GLB,, | Performed by: PLASTIC SURGERY

## 2020-06-03 PROCEDURE — 99999 PR PBB SHADOW E&M-EST. PATIENT-LVL III: ICD-10-PCS | Mod: PBBFAC,HCNC,, | Performed by: PLASTIC SURGERY

## 2020-06-03 PROCEDURE — 99024 PR POST-OP FOLLOW-UP VISIT: ICD-10-PCS | Mod: HCNC,S$GLB,, | Performed by: PLASTIC SURGERY

## 2020-06-03 NOTE — PROGRESS NOTES
"Mr. Urbina is here today for a post-operative visit.he is 15 days status post right carpal tunnel release. he reports that he is doing well.  Pain is minimal.  he is not taking pain medication he continues to have numbness within the median distribution of the right hand. he denies fever, chills, and sweats since the time of the surgery.     Physical exam:    Vitals:    06/03/20 1053   BP: (!) 149/81   Pulse: 65   Weight: 85.7 kg (189 lb)   Height: 5' 6" (1.676 m)   PainSc: 0-No pain     Sutures in place  Incision is clean, dry and intact.  There is no erythema or exudate.  There is no sign of any infection. he is NVI.  Sensation intact to light touch in the median distribution however this is decreased compared to the ulnar.  Poor opposition thenar atrophy noted    Assessment:  Status post surgery  Plan:  Dariel was seen today for post-op evaluation.    Diagnoses and all orders for this visit:    Status post surgery        - sutures removed today in clinic, may get it wet in the shower and use regular soap  - encourage patient to Continue Range of motion exercises   - Tylenol 500 mg and/or Ibuprofen 400mg every 4-6 hours with food for pain as tolerated  - - Follow up:  3 months    "

## 2020-06-15 NOTE — PROGRESS NOTES
Cardiology Clinic Note  Reason for Visit: CAD s/p CABG, HF    HPI:     Dariel Urbina is a 79 y.o. M, who for follow up of HF symptoms.    Still having dyspnea with exertion. No edema, orthopnea.  Does not check BP regularly at home, last few weeks ago  Had two episodes of atypical chest discomfort while at rest (going to sleep), lasted short seconds  No blood in urine or stool. Had nosebleed this week, first in 1yr.    Medical: CAD s/p CABG (3/27/18; LIMA-LAD, SVG-OM2 [likely occluded]), porcelain aorta, mechanical aortic valve (~1992/93), suprarenal AAA, bilateral carotid artery stenosis (L>R), DM, HLD, CKD III, chronic nose bleeds s/p cautery with ENT  Surgical: CABG, hernia, spine, vasectomy, aortic valve replacement  Family: mother, father, and brother with heart disease/heart failure   Social: previously smoked 1 PPD x20y (quit 1980), no alcohol use    ROS:    Constitution: Negative for fever or chills.  HENT: Negative for  headaches.  Eyes: Negative for blurred vision.   Cardiovascular: See above  Pulmonary: Positive for SOB. Negative for cough.   Gastrointestinal: Negative for nausea/vomiting.   : Negative for dysuria.   Skin: Negative for rashes.  Neurological: Negative for focal weakness.  Psychological: Negative for depression.  PMH:     Past Medical History:   Diagnosis Date    Anemia of chronic renal failure, stage 3 (moderate) 5/27/2015    Anticoagulant long-term use     Atherosclerosis of coronary artery bypass graft of native heart without angina pectoris 9/11/2012    3-27-18 [facility] Two vessel coronary artery disease.   Prosthetic aortic valve.   Porcelain aorta.   Patent LIMA graft.    Bilateral carotid artery disease 2/9/2017    Bleeding from the nose     Bleeding nose 3/21/2018    Cataract     CKD (chronic kidney disease) stage 3, GFR 30-59 ml/min 5/27/2015    Claudication of left lower extremity 9/17/2014    Colon polyp     Gastroesophageal reflux disease without esophagitis  3/19/2018    Gastrointestinal hemorrhage associated with intestinal diverticulosis 4/1/2018    H/O mechanical aortic valve replacement 9/17/2014    History of gout 9/26/2012    Hyperparathyroidism due to renal insufficiency 7/27/2015    Internal hemorrhoid 4/3/2018    Long term current use of anticoagulant therapy 9/26/2012    Mechanical heart valve present     Metabolic acidosis with normal anion gap and bicarbonate losses 3/20/2018    Mixed hyperlipidemia 9/26/2012    NSTEMI (non-ST elevated myocardial infarction) 3/21/2018    Obesity, diabetes, and hypertension syndrome 2/23/2016    PVD (peripheral vascular disease) 9/11/2012    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/2012    Type 2 diabetes mellitus with diabetic peripheral angiopathy without gangrene 5/27/2015    Type 2 diabetes mellitus with stage 3 chronic kidney disease, without long-term current use of insulin 10/2/2013     Past Surgical History:   Procedure Laterality Date    CARDIAC CATHETERIZATION      CARDIAC VALVE REPLACEMENT      CARDIAC VALVE SURGERY      CARPAL TUNNEL RELEASE Right 5/19/2020    Procedure: RELEASE, CARPAL TUNNEL;  Surgeon: Rupesh Norris Jr., MD;  Location: [facility];  Service: Plastics;  Laterality: Right;    COLON SURGERY      COLONOSCOPY N/A 3/31/2017    Procedure: COLONOSCOPY;  Surgeon: Bruno Raymond MD;  Location: [facility] (4TH FLR);  Service: Endoscopy;  Laterality: N/A;  Patient's wife requesting date.    COLONOSCOPY N/A 4/3/2018    Procedure: COLONOSCOPY;  Surgeon: Bonifacio Pelletier MD;  Location: [facility] ENDO (2ND FLR);  Service: Endoscopy;  Laterality: N/A;    COLONOSCOPY N/A 8/13/2018    Procedure: COLONOSCOPY;  Surgeon: Kam Barba MD;  Location: [facility] ENDO (2ND FLR);  Service: Endoscopy;  Laterality: N/A;  2nd floor: PA pressure 49; hx of moderate-severe valve disease     per Coumadin clinic-Patient can hold 5 days with lovenox bridge       ok to schedule per Katarina    CORONARY ANGIOPLASTY       CORONARY ARTERY BYPASS GRAFT      HERNIA REPAIR      SPINE SURGERY      VASECTOMY       Allergies:     Review of patient's allergies indicates:   Allergen Reactions    Fosinopril      Intolerance- elevates potassium level      Losartan      Intolerance- elevates potassium level     Medications:     Current Outpatient Medications on File Prior to Visit   Medication Sig Dispense Refill    allopurinol (ZYLOPRIM) 100 MG tablet TAKE 1 TABLET EVERY DAY 90 tablet 3    aspirin (ECOTRIN) 81 MG EC tablet Take 1 tablet (81 mg total) by mouth once daily.  0    bumetanide (BUMEX) 2 MG tablet Take 2mg Once to twice daily or as directed 180 tablet 3    carvedilol (COREG) 12.5 MG tablet Take 1 tablet (12.5 mg total) by mouth 2 (two) times daily. 180 tablet 4    glimepiride (AMARYL) 1 MG tablet Take 1 tablet (1 mg total) by mouth every morning. 90 tablet 3    HYDROcodone-acetaminophen (NORCO) 5-325 mg per tablet Take 1 tablet by mouth every 6 (six) hours as needed for Pain. 20 tablet 0    isosorbide mononitrate (IMDUR) 120 MG 24 hr tablet TAKE 1 TABLET EVERY DAY 90 tablet 4    NIFEdipine (PROCARDIA-XL) 60 MG (OSM) 24 hr tablet Take 1 tablet (60 mg total) by mouth once daily. 90 tablet 4    nitroGLYCERIN (NITROSTAT) 0.4 MG SL tablet Place 1 tablet (0.4 mg total) under the tongue every 5 (five) minutes as needed. As needed for chest pain 90 tablet 4    omega-3 acid ethyl esters (LOVAZA) 1 gram capsule Take 2 capsules (2 g total) by mouth 2 (two) times daily. 360 capsule 5    rosuvastatin (CRESTOR) 40 MG Tab Take 1 tablet (40 mg total) by mouth every evening. 90 tablet 3    warfarin (COUMADIN) 5 MG tablet TAKE 1 AND 1/2 TO 2 TABLETS (7.5 TO 10 MG TOTAL) BY  MOUTH DAILY AS DIRECTED BY COUMADIN CLINIC 180 tablet 3    warfarin (COUMADIN) 5 MG tablet Take 2 tablets (10mg) by mouth Sunday, then 1.5 tablets (7.5mg) by mouth all other days as instructed by Coumadin Clinic. 143 tablet 0     No current facility-administered  "medications on file prior to visit.      Social History:     Social History     Tobacco Use    Smoking status: Former Smoker     Packs/day: 1.00     Years: 20.00     Pack years: 20.00     Quit date: 1980     Years since quittin.7    Smokeless tobacco: Never Used   Substance Use Topics    Alcohol use: No     Family History:     Family History   Problem Relation Age of Onset    Heart failure Mother     Heart disease Mother     Heart failure Father     Heart disease Father     Alcohol abuse Father     Heart failure Brother     Heart disease Brother     Diabetes Brother     No Known Problems Sister     No Known Problems Maternal Grandmother     No Known Problems Maternal Grandfather     No Known Problems Paternal Grandmother     No Known Problems Paternal Grandfather     Heart disease Sister     No Known Problems Maternal Aunt     No Known Problems Maternal Uncle     No Known Problems Paternal Aunt     No Known Problems Paternal Uncle     Amblyopia Neg Hx     Blindness Neg Hx     Cancer Neg Hx     Cataracts Neg Hx     Glaucoma Neg Hx     Hypertension Neg Hx     Macular degeneration Neg Hx     Retinal detachment Neg Hx     Strabismus Neg Hx     Stroke Neg Hx     Thyroid disease Neg Hx     Anemia Neg Hx     Arrhythmia Neg Hx     Asthma Neg Hx     Clotting disorder Neg Hx     Fainting Neg Hx     Heart attack Neg Hx     Hyperlipidemia Neg Hx     Atrial Septal Defect Neg Hx     Melanoma Neg Hx      Physical Exam:   Ht 5' 6" (1.676 m)   Wt 86.6 kg (190 lb 14.7 oz)   BMI 30.81 kg/m²      Constitutional: No apparent distress, conversant  HEENT: Sclera anicteric, extraocular movements intact  Neck: No JVD, no carotid bruits  CV: Regular rate and rhythm, 2/6 systolic murmur at RUSB with valve clicks, 1/6 holosystolic murmur at RUSB, 2/6 holosystolic murmur at apex  Pulm: Clear to auscultation  GI: Abdomen soft, no palpable masses  Extremities: No edema, warm with palpable " pulses  Skin: No ecchymosis, erythema, or ulcers  Psych: Alert and oriented to person place location, appropriate affect  Neuro: No focal deficits    Labs:     Blood Tests:  Lab Results   Component Value Date     (H) 01/06/2020     01/21/2020    K 4.6 01/21/2020    CL 99 01/21/2020    CO2 31 (H) 01/21/2020    BUN 54 (H) 01/21/2020    CREATININE 1.9 (H) 01/21/2020     (H) 01/21/2020    HGBA1C 5.5 07/17/2019    MG 1.8 11/19/2019    AST 25 01/21/2020    ALT 23 01/21/2020    ALBUMIN 4.2 01/21/2020    PROT 8.5 (H) 01/21/2020    BILITOT 0.5 01/21/2020    WBC 5.69 01/06/2020    HGB 11.9 (L) 01/06/2020    HCT 35.4 (L) 01/06/2020    MCV 92 01/06/2020     (L) 01/06/2020    INR 1.7 (H) 05/26/2020    INR 2.0 (H) 03/15/2010    PSA 0.35 07/27/2012    TSH 3.029 02/02/2017       Lab Results   Component Value Date    CHOL 194 07/17/2019    HDL 40 07/17/2019    TRIG 277 (H) 07/17/2019       Lab Results   Component Value Date    LDLCALC 98.6 07/17/2019       Urine Tests:  Lab Results   Component Value Date    COLORU Yellow 11/19/2019    APPEARANCEUA Clear 11/19/2019    PHUR 5.0 11/19/2019    SPECGRAV 1.015 11/19/2019    PROTEINUA 2+ (A) 11/19/2019    GLUCUA Negative 11/19/2019    KETONESU Negative 11/19/2019    BILIRUBINUA Negative 11/19/2019    OCCULTUA Negative 11/19/2019    NITRITE Negative 11/19/2019    UROBILINOGEN Negative 04/01/2018    LEUKOCYTESUR Negative 11/19/2019    PROTEINURINE 126 (H) 11/19/2019    PROTEINURINE 8 03/21/2018    CREATRANDUR 102.0 11/19/2019    UTPCR 1.24 (H) 11/19/2019       Imaging:     Echocardiogram  TTE 3/6/20  · Mildly decreased left ventricular systolic function. The estimated ejection fraction is 45%. QEF 44%. Global hypokinetic wall motion.  · Grade II (moderate) left ventricular diastolic dysfunction consistent with pseudonormalization.  · There is a mechanical aortic valve present. Peak velocity = 3.1m/s, acceleration time <100msec, DI 0.27. Trace aortic  regurgitation.  · Mild-to-moderate mitral regurgitation.  · Normal right ventricular systolic function.  · Mild tricuspid regurgitation.  · The estimated PA systolic pressure is 50 mmHg.  · Normal central venous pressure (3 mmHg).  · Pulmonary hypertension present.   · Severe left atrial enlargement.    TTE 1/21/20  · Mildly decreased left ventricular systolic function. The estimated ejection fraction is 45%.  · Eccentric left ventricular hypertrophy.  · Septal wall has abnormal motion consistent with left bundle branch block.  · Grade I (mild) left ventricular diastolic dysfunction consistent with impaired relaxation.  · Normal right ventricular systolic function.  · Severe left atrial enlargement.  · Mild-to-moderate mitral regurgitation.  · There is a mechanical aortic valve present., Mean gradient = 12 mmHg,. DI= 0.38. Trace to mild centeral AI.  · The estimated PA systolic pressure is 35 mmHg.  · Normal central venous pressure (3 mmHg).    TTE 4/26/18  CONCLUSIONS     1 - Mild left ventricular enlargement.     2 - Low normal to mildly depressed left ventricular systolic function (EF 50-55%).     3 - Eccentric hypertrophy.     4 - Wall motion abnormalities.     5 - Impaired LV relaxation, elevated LAP (grade 2 diastolic dysfunction).     6 - Biatrial enlargement.     7 - Right ventricular enlargement with low normal systolic function.     8 - Aortic valve prosthesis, MATIAS = 0.96 cm2, AVAi = 0.5 cm2/m2, peak velocity = 2.62 m/s, mean gradient = 17 mmHg.     9 - Trivial aortic regurgitation.     10 - Moderate to moderate-severe (2-3+) mitral regurgitation.     11 - Moderate tricuspid regurgitation.     12 - Mild pulmonic regurgitation.     13 - Pulmonary hypertension. The estimated PA systolic pressure is 49 mmHg.      TTE 3/21/18  CONCLUSIONS     1 - Limited portable study.     2 - Low normal to mildly depressed left ventricular systolic function (EF 50-55%).     3 - Normal right ventricular systolic function .      4 - Normal left ventricular diastolic function.     5 - Mild mitral regurgitation.     6 - Aortic valve mechanical prosthesis - no Doppler interrogation performed.     7 - Consider full color Doppler study if clinically indicated.     TTE 5/3/17  CONCLUSIONS     1 - Eccentric LVH with normal left ventricular systolic function (EF 60-65%).     2 - Severe left atrial enlargement.     3 - Normal right ventricular systolic function .     4 - Aortic valve mechanical prosthesis with a mean gradient of 17mmHg.     5 - Moderate to severe mitral regurgitation.     6 - Moderate tricuspid regurgitation.     7 - Pulmonary hypertension. The estimated PA systolic pressure is 54 mmHg.     Stress testing  PET 3/23/18  CONCLUSIONS: ABNORMAL MYOCARDIAL PERFUSION PET STRESS TEST  1. There is a moderate sized mild to moderate ischemia in the base to distal inferolateral wall. This defect comprises 15 % of the left ventricular myocardium.   2. Resting wall motion is physiologic. Stress wall motion is physiologic.   3. LV function is normal at rest and stress.  (normal is >= 51%)  4. The ventricular volumes are normal at rest and stress.   5. The extracardiac distribution of radioactivity is normal.   6. When compared to the previous study from 12/8/16, there is no signficant change.    PET 12/8/16  CONCLUSIONS: ABNORMAL MYOCARDIAL PERFUSION PET STRESS TEST  1. There is a small sized mild ischemia in the distal inferolateral wall in the usual distribution of the distal Left Circumflex Artery. This defect comprises 10 % of the left ventricular myocardium.   2. Resting wall motion is physiologic. Stress wall motion is physiologic.   3. Visually estimated LV function is normal at rest and normal at stress.   4. The ventricular volumes are normal at rest and stress.   5. The extracardiac distribution of radioactivity is normal.   6. There was no previous study available to compare.    Cath Lab  [facility] 3/27/18  Diagnostic:      Patient has a  right dominant coronary artery.      - Left Main Coronary Artery:           The distal LM has a 75% stenosis. There is JAMILAH 3 flow.     - Left Anterior Descending Artery:           The mid LAD has chronic total occlusion. There is JAMILAH 0 flow.     - Left Circumflex Artery:           The LCX is diffusely diseased (75). There is JAMILAH 3 flow.     - Right Coronary Artery:           The RCA has luminal irregularities. There is JAMILAH 3 flow.     - CAMARGO To LAD:           The LIMA to LAD is normal. There is JAMILAH 3 flow.     - SVG To OM2:           The SVG to OM2 was not found.     - Radial:           The radial was not studied.    Other  Carotid US 3/18/20  Right Carotid The right distal common carotid artery is visualized. There is mild heterogeneous plaque in the right distal common carotid artery.     The right carotid bulb artery is visualized. There is mild heterogeneous plaque in the right carotid bulb artery.     The right proximal internal carotid artery has 20-39% stenosis. There is moderate heterogeneous plaque in the right proximal internal carotid artery.     There is acceleration in the right external carotid artery. There is severe heterogeneous plaque in the right external carotid artery.     The right vertebral artery is visualized.   The right vertebral artery is associated with anterograde flow.     The highest right ICA velocity divided by the highest right CCA velocity is 1.88 .   Left Carotid The left distal common carotid artery is visualized. There is mild heterogeneous plaque in the left distal common carotid artery.    The left carotid bulb artery is visualized. There is mild calcific plaque in the left carotid bulb artery.    The left proximal internal carotid artery has 40-49% stenosis. There is mild heterogeneous plaque in the left proximal internal carotid artery.    The left external carotid artery is visualized.     The left vertebral artery is visualized.   The left vertebral artery is  associated with anterograde flow.     The highest left ICA velocity divided by the highest left CCA velocity is 2.03 .     Abdominal aorta 5/10/18  CONCLUSIONS   There is an Abdominal Aortic Aneurysm largest at the Suprarenal level measuring 3.32 cm.  There is an accelerated PSV at the left common iliac level of 310 cm/s  indicating a possible stenosis.    Abdominal aorta 17  CONCLUSIONS   There is an Abdominal Aortic Aneurysm largest at the Suprarenal level measuring 3.35 cm.  There is an accelerated PSV at the left common iliac level of 317 cm/s  indicating a possible stenosis.    EK/1/18  Sinus bradycardia with 1st degree A-V block with occasional Premature ventricular complexes  Left atrial abnormality/enlargement  Left bundle branch block    Assessment:     1. Mixed hyperlipidemia    2. Bilateral carotid artery stenosis    3. Renovascular hypertension    4. PVD (peripheral vascular disease)    5. Essential hypertension    6. H/O mechanical aortic valve replacement    7. Coronary artery disease of bypass graft of native heart with stable angina pectoris    8. Abdominal aortic aneurysm (AAA) without rupture    9. CKD (chronic kidney disease) stage 3, GFR 30-59 ml/min    10. Type 2 diabetes mellitus with stage 3 chronic kidney disease, without long-term current use of insulin    11. Obesity, diabetes, and hypertension syndrome    12. Shortness of breath    13. Bilateral carpal tunnel syndrome      Plan:     Shortness of breath  Chronic heart failure with mid range EF  Baseline home weight 182lb. Stable. Euvolemic on exam.  Continue bumex 2mg daily with extra dose prn.  Continue coreg 12.5mg BID  Check CMP for renal function. Will likely increase BB vs add ACEi.    Coronary artery disease of bypass graft of native heart with stable angina pectoris  Has 15% ischemia in territory of occluded OM2 graft.   Continue current medications:    ASA, rosuvastatin 40mg daily, coreg 12.5mg BID, nifedipine 60mg daily,  ISMN 120mg daily    H/O mechanical aortic valve replacement  Stable  Echo with normal AVR function  Repeat in 1yr    Bilateral carotid artery stenosis  Mixed hyperlipidemia  PVD (peripheral vascular disease)  Abdominal aortic aneurysm (AAA) without rupture  Stable  LDL not at goal in 7/2019 on crestor 40mg daily  Check lipids today. Will likely discuss zetia vs [id number]    Essential hypertension  CKD (chronic kidney disease) stage 3, GFR 30-59 ml/min  BP improved today  Continue current medications    Instructed to keep BP log for 7-10 days and call with values for my review  Check CMP, as above    Obesity (BMI 30.0-34.9)  Type 2 diabetes mellitus with stage 3 chronic kidney disease, without long-term current use of insulin  Followed by PCP    Signed:  Yuriy Shirley MD  Cardiology     6/22/2020 11:10 AM    Follow-up:     Future Appointments   Date Time Provider Department Center   6/16/2020  7:10 AM LAB, APPOINTMENT [facility] LAB [facility]   6/22/2020 10:30 AM Liborio Shirley III, MD [facility] CARDIO [facility]   9/4/2020  8:45 AM Rupesh Norris Jr., MD [facility] HAND [facility]

## 2020-06-16 ENCOUNTER — LAB VISIT (OUTPATIENT)
Dept: LAB | Facility: HOSPITAL | Age: 80
End: 2020-06-16
Attending: INTERNAL MEDICINE
Payer: MEDICARE

## 2020-06-16 ENCOUNTER — ANTI-COAG VISIT (OUTPATIENT)
Dept: CARDIOLOGY | Facility: CLINIC | Age: 80
End: 2020-06-16
Payer: MEDICARE

## 2020-06-16 DIAGNOSIS — Z95.2 H/O MECHANICAL AORTIC VALVE REPLACEMENT: ICD-10-CM

## 2020-06-16 DIAGNOSIS — Z79.01 LONG TERM CURRENT USE OF ANTICOAGULANT THERAPY: ICD-10-CM

## 2020-06-16 LAB
INR PPP: 2.2 (ref 0.8–1.2)
PROTHROMBIN TIME: 20.5 SEC (ref 9–12.5)

## 2020-06-16 PROCEDURE — 93793 PR ANTICOAGULANT MGMT FOR PT TAKING WARFARIN: ICD-10-PCS | Mod: S$GLB,,,

## 2020-06-16 PROCEDURE — 85610 PROTHROMBIN TIME: CPT | Mod: HCNC

## 2020-06-16 PROCEDURE — 36415 COLL VENOUS BLD VENIPUNCTURE: CPT | Mod: HCNC

## 2020-06-16 PROCEDURE — 93793 ANTICOAG MGMT PT WARFARIN: CPT | Mod: S$GLB,,,

## 2020-06-21 ENCOUNTER — PATIENT OUTREACH (OUTPATIENT)
Dept: ADMINISTRATIVE | Facility: OTHER | Age: 80
End: 2020-06-21

## 2020-06-21 NOTE — PROGRESS NOTES
Care Everywhere: updated  Immunization: updated  Health Maintenance: updated  Media Review:   Legacy Review:   Order placed:   Upcoming appts: hemoglobin a1c 7.7.2020

## 2020-06-22 ENCOUNTER — OFFICE VISIT (OUTPATIENT)
Dept: CARDIOLOGY | Facility: CLINIC | Age: 80
End: 2020-06-22
Payer: MEDICARE

## 2020-06-22 ENCOUNTER — LAB VISIT (OUTPATIENT)
Dept: LAB | Facility: HOSPITAL | Age: 80
End: 2020-06-22
Attending: INTERNAL MEDICINE
Payer: MEDICARE

## 2020-06-22 VITALS
BODY MASS INDEX: 30.69 KG/M2 | WEIGHT: 190.94 LBS | HEART RATE: 72 BPM | SYSTOLIC BLOOD PRESSURE: 130 MMHG | HEIGHT: 66 IN | DIASTOLIC BLOOD PRESSURE: 60 MMHG

## 2020-06-22 DIAGNOSIS — E11.59 OBESITY, DIABETES, AND HYPERTENSION SYNDROME: ICD-10-CM

## 2020-06-22 DIAGNOSIS — R06.02 SHORTNESS OF BREATH: ICD-10-CM

## 2020-06-22 DIAGNOSIS — I25.708 CORONARY ARTERY DISEASE OF BYPASS GRAFT OF NATIVE HEART WITH STABLE ANGINA PECTORIS: ICD-10-CM

## 2020-06-22 DIAGNOSIS — Z95.2 H/O MECHANICAL AORTIC VALVE REPLACEMENT: ICD-10-CM

## 2020-06-22 DIAGNOSIS — E66.9 OBESITY, DIABETES, AND HYPERTENSION SYNDROME: ICD-10-CM

## 2020-06-22 DIAGNOSIS — I15.0 RENOVASCULAR HYPERTENSION: ICD-10-CM

## 2020-06-22 DIAGNOSIS — E78.2 MIXED HYPERLIPIDEMIA: Primary | ICD-10-CM

## 2020-06-22 DIAGNOSIS — I15.2 OBESITY, DIABETES, AND HYPERTENSION SYNDROME: ICD-10-CM

## 2020-06-22 DIAGNOSIS — I73.9 PVD (PERIPHERAL VASCULAR DISEASE): ICD-10-CM

## 2020-06-22 DIAGNOSIS — N18.30 CKD (CHRONIC KIDNEY DISEASE) STAGE 3, GFR 30-59 ML/MIN: ICD-10-CM

## 2020-06-22 DIAGNOSIS — N18.30 TYPE 2 DIABETES MELLITUS WITH STAGE 3 CHRONIC KIDNEY DISEASE, WITHOUT LONG-TERM CURRENT USE OF INSULIN: ICD-10-CM

## 2020-06-22 DIAGNOSIS — I65.23 BILATERAL CAROTID ARTERY STENOSIS: ICD-10-CM

## 2020-06-22 DIAGNOSIS — E11.22 TYPE 2 DIABETES MELLITUS WITH STAGE 3 CHRONIC KIDNEY DISEASE, WITHOUT LONG-TERM CURRENT USE OF INSULIN: ICD-10-CM

## 2020-06-22 DIAGNOSIS — G56.03 BILATERAL CARPAL TUNNEL SYNDROME: ICD-10-CM

## 2020-06-22 DIAGNOSIS — E11.69 OBESITY, DIABETES, AND HYPERTENSION SYNDROME: ICD-10-CM

## 2020-06-22 DIAGNOSIS — I71.40 ABDOMINAL AORTIC ANEURYSM (AAA) WITHOUT RUPTURE: ICD-10-CM

## 2020-06-22 DIAGNOSIS — E78.2 MIXED HYPERLIPIDEMIA: ICD-10-CM

## 2020-06-22 DIAGNOSIS — I10 ESSENTIAL HYPERTENSION: ICD-10-CM

## 2020-06-22 PROBLEM — R60.0 BILATERAL LOWER EXTREMITY EDEMA: Status: RESOLVED | Noted: 2019-09-25 | Resolved: 2020-06-22

## 2020-06-22 LAB
ALBUMIN SERPL BCP-MCNC: 3.8 G/DL (ref 3.5–5.2)
ALP SERPL-CCNC: 59 U/L (ref 55–135)
ALT SERPL W/O P-5'-P-CCNC: 30 U/L (ref 10–44)
ANION GAP SERPL CALC-SCNC: 8 MMOL/L (ref 8–16)
AST SERPL-CCNC: 28 U/L (ref 10–40)
BILIRUB SERPL-MCNC: 0.4 MG/DL (ref 0.1–1)
BUN SERPL-MCNC: 49 MG/DL (ref 8–23)
CALCIUM SERPL-MCNC: 10 MG/DL (ref 8.7–10.5)
CHLORIDE SERPL-SCNC: 106 MMOL/L (ref 95–110)
CHOLEST SERPL-MCNC: 150 MG/DL (ref 120–199)
CHOLEST/HDLC SERPL: 3.9 {RATIO} (ref 2–5)
CO2 SERPL-SCNC: 27 MMOL/L (ref 23–29)
CREAT SERPL-MCNC: 1.6 MG/DL (ref 0.5–1.4)
EST. GFR  (AFRICAN AMERICAN): 47 ML/MIN/1.73 M^2
EST. GFR  (NON AFRICAN AMERICAN): 40.7 ML/MIN/1.73 M^2
GLUCOSE SERPL-MCNC: 127 MG/DL (ref 70–110)
HDLC SERPL-MCNC: 38 MG/DL (ref 40–75)
HDLC SERPL: 25.3 % (ref 20–50)
LDLC SERPL CALC-MCNC: 70 MG/DL (ref 63–159)
NONHDLC SERPL-MCNC: 112 MG/DL
POTASSIUM SERPL-SCNC: 4.8 MMOL/L (ref 3.5–5.1)
PROT SERPL-MCNC: 7.5 G/DL (ref 6–8.4)
SODIUM SERPL-SCNC: 141 MMOL/L (ref 136–145)
TRIGL SERPL-MCNC: 210 MG/DL (ref 30–150)

## 2020-06-22 PROCEDURE — 99499 UNLISTED E&M SERVICE: CPT | Mod: HCNC,S$GLB,, | Performed by: INTERNAL MEDICINE

## 2020-06-22 PROCEDURE — 3075F SYST BP GE 130 - 139MM HG: CPT | Mod: HCNC,CPTII,S$GLB, | Performed by: INTERNAL MEDICINE

## 2020-06-22 PROCEDURE — 99214 OFFICE O/P EST MOD 30 MIN: CPT | Mod: HCNC,S$GLB,, | Performed by: INTERNAL MEDICINE

## 2020-06-22 PROCEDURE — 1126F AMNT PAIN NOTED NONE PRSNT: CPT | Mod: HCNC,S$GLB,, | Performed by: INTERNAL MEDICINE

## 2020-06-22 PROCEDURE — 99214 PR OFFICE/OUTPT VISIT, EST, LEVL IV, 30-39 MIN: ICD-10-PCS | Mod: HCNC,S$GLB,, | Performed by: INTERNAL MEDICINE

## 2020-06-22 PROCEDURE — 36415 COLL VENOUS BLD VENIPUNCTURE: CPT | Mod: HCNC,PO

## 2020-06-22 PROCEDURE — 3078F PR MOST RECENT DIASTOLIC BLOOD PRESSURE < 80 MM HG: ICD-10-PCS | Mod: HCNC,CPTII,S$GLB, | Performed by: INTERNAL MEDICINE

## 2020-06-22 PROCEDURE — 3078F DIAST BP <80 MM HG: CPT | Mod: HCNC,CPTII,S$GLB, | Performed by: INTERNAL MEDICINE

## 2020-06-22 PROCEDURE — 99999 PR PBB SHADOW E&M-EST. PATIENT-LVL III: CPT | Mod: PBBFAC,HCNC,, | Performed by: INTERNAL MEDICINE

## 2020-06-22 PROCEDURE — 99499 RISK ADDL DX/OHS AUDIT: ICD-10-PCS | Mod: HCNC,S$GLB,, | Performed by: INTERNAL MEDICINE

## 2020-06-22 PROCEDURE — 99999 PR PBB SHADOW E&M-EST. PATIENT-LVL III: ICD-10-PCS | Mod: PBBFAC,HCNC,, | Performed by: INTERNAL MEDICINE

## 2020-06-22 PROCEDURE — 80053 COMPREHEN METABOLIC PANEL: CPT | Mod: HCNC

## 2020-06-22 PROCEDURE — 80061 LIPID PANEL: CPT | Mod: HCNC

## 2020-06-22 PROCEDURE — 1126F PR PAIN SEVERITY QUANTIFIED, NO PAIN PRESENT: ICD-10-PCS | Mod: HCNC,S$GLB,, | Performed by: INTERNAL MEDICINE

## 2020-06-22 PROCEDURE — 1101F PT FALLS ASSESS-DOCD LE1/YR: CPT | Mod: HCNC,CPTII,S$GLB, | Performed by: INTERNAL MEDICINE

## 2020-06-22 PROCEDURE — 3075F PR MOST RECENT SYSTOLIC BLOOD PRESS GE 130-139MM HG: ICD-10-PCS | Mod: HCNC,CPTII,S$GLB, | Performed by: INTERNAL MEDICINE

## 2020-06-22 PROCEDURE — 1101F PR PT FALLS ASSESS DOC 0-1 FALLS W/OUT INJ PAST YR: ICD-10-PCS | Mod: HCNC,CPTII,S$GLB, | Performed by: INTERNAL MEDICINE

## 2020-06-22 PROCEDURE — 1159F PR MEDICATION LIST DOCUMENTED IN MEDICAL RECORD: ICD-10-PCS | Mod: HCNC,S$GLB,, | Performed by: INTERNAL MEDICINE

## 2020-06-22 PROCEDURE — 1159F MED LIST DOCD IN RCRD: CPT | Mod: HCNC,S$GLB,, | Performed by: INTERNAL MEDICINE

## 2020-06-24 ENCOUNTER — TELEPHONE (OUTPATIENT)
Dept: CARDIOLOGY | Facility: CLINIC | Age: 80
End: 2020-06-24

## 2020-06-24 DIAGNOSIS — M10.9 GOUT, UNSPECIFIED CAUSE, UNSPECIFIED CHRONICITY, UNSPECIFIED SITE: ICD-10-CM

## 2020-06-24 RX ORDER — GLIMEPIRIDE 1 MG/1
1 TABLET ORAL EVERY MORNING
Qty: 90 TABLET | Refills: 3 | Status: SHIPPED | OUTPATIENT
Start: 2020-06-24 | End: 2021-08-04 | Stop reason: SDUPTHER

## 2020-06-24 RX ORDER — ALLOPURINOL 100 MG/1
TABLET ORAL
Qty: 90 TABLET | Refills: 3 | Status: SHIPPED | OUTPATIENT
Start: 2020-06-24 | End: 2021-07-02 | Stop reason: SDUPTHER

## 2020-06-24 NOTE — TELEPHONE ENCOUNTER
----- Message from Xiomara Serenevinicius sent at 6/24/2020  9:25 AM CDT -----  Contact: [facility] [phone]  Requesting an RX refill or new RX.  Is this a refill or new RX:  refill  RX name and strength: glimepiride (AMARYL) 1 MG tablet  Directions (copy/paste from chart):    Is this a 30 day or 90 day RX:    Local pharmacy or mail order pharmacy:  mail  Pharmacy name and phone # (copy/paste from chart):  [facility] - [place], [place] - [address] [phone] (Phone)  [phone] (Fax)       Comments:          Requesting an RX refill or new RX.  Is this a refill or new RX:  refill  RX name and strength: allopurinol (ZYLOPRIM) 100 MG tablet  Directions (copy/paste from chart):    Is this a 30 day or 90 day RX:    Local pharmacy or mail order pharmacy:  mail  Pharmacy name and phone # (copy/paste from chart):   [facility] - [place], [place] - [address] [phone] (Phone)  [phone] (Fax)      Comments:

## 2020-06-24 NOTE — TELEPHONE ENCOUNTER
----- Message from Liborio Shirley III, MD sent at 6/24/2020  3:07 PM CDT -----  Can you please discuss his recent labs with him? They show improved kidney function (back to his baseline) and cholesterol is now at goal. I want him to call me in ~1 week with his BP readings, so we can adjust as needed.    Thanks,Yuriy

## 2020-07-07 ENCOUNTER — LAB VISIT (OUTPATIENT)
Dept: LAB | Facility: HOSPITAL | Age: 80
End: 2020-07-07
Attending: INTERNAL MEDICINE
Payer: MEDICARE

## 2020-07-07 ENCOUNTER — ANTI-COAG VISIT (OUTPATIENT)
Dept: CARDIOLOGY | Facility: CLINIC | Age: 80
End: 2020-07-07
Payer: MEDICARE

## 2020-07-07 DIAGNOSIS — Z95.2 H/O MECHANICAL AORTIC VALVE REPLACEMENT: ICD-10-CM

## 2020-07-07 DIAGNOSIS — Z79.01 LONG TERM CURRENT USE OF ANTICOAGULANT THERAPY: ICD-10-CM

## 2020-07-07 DIAGNOSIS — E11.22 TYPE 2 DIABETES MELLITUS WITH STAGE 3 CHRONIC KIDNEY DISEASE, WITHOUT LONG-TERM CURRENT USE OF INSULIN: ICD-10-CM

## 2020-07-07 DIAGNOSIS — N18.30 TYPE 2 DIABETES MELLITUS WITH STAGE 3 CHRONIC KIDNEY DISEASE, WITHOUT LONG-TERM CURRENT USE OF INSULIN: ICD-10-CM

## 2020-07-07 LAB
ESTIMATED AVG GLUCOSE: 123 MG/DL (ref 68–131)
HBA1C MFR BLD HPLC: 5.9 % (ref 4–5.6)
INR PPP: 2.2 (ref 0.8–1.2)
PROTHROMBIN TIME: 21.4 SEC (ref 9–12.5)

## 2020-07-07 PROCEDURE — 83036 HEMOGLOBIN GLYCOSYLATED A1C: CPT | Mod: HCNC

## 2020-07-07 PROCEDURE — 36415 COLL VENOUS BLD VENIPUNCTURE: CPT | Mod: HCNC

## 2020-07-07 PROCEDURE — 93793 ANTICOAG MGMT PT WARFARIN: CPT | Mod: S$GLB,,,

## 2020-07-07 PROCEDURE — 85610 PROTHROMBIN TIME: CPT | Mod: HCNC

## 2020-07-07 PROCEDURE — 93793 PR ANTICOAGULANT MGMT FOR PT TAKING WARFARIN: ICD-10-PCS | Mod: S$GLB,,,

## 2020-07-20 DIAGNOSIS — I25.10 CORONARY ARTERY DISEASE, ANGINA PRESENCE UNSPECIFIED, UNSPECIFIED VESSEL OR LESION TYPE, UNSPECIFIED WHETHER NATIVE OR TRANSPLANTED HEART: ICD-10-CM

## 2020-07-20 RX ORDER — ISOSORBIDE MONONITRATE 120 MG/1
120 TABLET, EXTENDED RELEASE ORAL DAILY
Qty: 90 TABLET | Refills: 4 | Status: SHIPPED | OUTPATIENT
Start: 2020-07-20 | End: 2021-08-02

## 2020-07-28 ENCOUNTER — ANTI-COAG VISIT (OUTPATIENT)
Dept: CARDIOLOGY | Facility: CLINIC | Age: 80
End: 2020-07-28
Payer: MEDICARE

## 2020-07-28 ENCOUNTER — LAB VISIT (OUTPATIENT)
Dept: LAB | Facility: HOSPITAL | Age: 80
End: 2020-07-28
Attending: INTERNAL MEDICINE
Payer: MEDICARE

## 2020-07-28 DIAGNOSIS — Z95.2 H/O MECHANICAL AORTIC VALVE REPLACEMENT: ICD-10-CM

## 2020-07-28 DIAGNOSIS — Z79.01 LONG TERM CURRENT USE OF ANTICOAGULANT THERAPY: ICD-10-CM

## 2020-07-28 LAB
INR PPP: 2.2 (ref 0.8–1.2)
PROTHROMBIN TIME: 23.7 SEC (ref 9–12.5)

## 2020-07-28 PROCEDURE — 93793 PR ANTICOAGULANT MGMT FOR PT TAKING WARFARIN: ICD-10-PCS | Mod: S$GLB,,,

## 2020-07-28 PROCEDURE — 36415 COLL VENOUS BLD VENIPUNCTURE: CPT | Mod: HCNC

## 2020-07-28 PROCEDURE — 93793 ANTICOAG MGMT PT WARFARIN: CPT | Mod: S$GLB,,,

## 2020-07-28 PROCEDURE — 85610 PROTHROMBIN TIME: CPT | Mod: HCNC

## 2020-08-17 ENCOUNTER — PES CALL (OUTPATIENT)
Dept: ADMINISTRATIVE | Facility: CLINIC | Age: 80
End: 2020-08-17
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
## 2020-09-01 ENCOUNTER — LAB VISIT (OUTPATIENT)
Dept: LAB | Facility: HOSPITAL | Age: 80
End: 2020-09-01
Attending: INTERNAL MEDICINE
Payer: MEDICARE

## 2020-09-01 ENCOUNTER — ANTI-COAG VISIT (OUTPATIENT)
Dept: CARDIOLOGY | Facility: CLINIC | Age: 80
End: 2020-09-01
Payer: MEDICARE

## 2020-09-01 DIAGNOSIS — Z95.2 H/O MECHANICAL AORTIC VALVE REPLACEMENT: ICD-10-CM

## 2020-09-01 DIAGNOSIS — Z79.01 LONG TERM CURRENT USE OF ANTICOAGULANT THERAPY: ICD-10-CM

## 2020-09-01 LAB
INR PPP: 3 (ref 0.8–1.2)
PROTHROMBIN TIME: 31.3 SEC (ref 9–12.5)

## 2020-09-01 PROCEDURE — 36415 COLL VENOUS BLD VENIPUNCTURE: CPT | Mod: HCNC

## 2020-09-01 PROCEDURE — 93793 ANTICOAG MGMT PT WARFARIN: CPT | Mod: S$GLB,,,

## 2020-09-01 PROCEDURE — 85610 PROTHROMBIN TIME: CPT | Mod: HCNC

## 2020-09-01 PROCEDURE — 93793 PR ANTICOAGULANT MGMT FOR PT TAKING WARFARIN: ICD-10-PCS | Mod: S$GLB,,,

## 2020-09-01 NOTE — PROGRESS NOTES
INR at goal. Medications and chart reviewed. No changes noted to necessitate adjustment of warfarin or follow-up plan. See calendar.  Pt to have a serving of greens.

## 2020-09-03 ENCOUNTER — PATIENT OUTREACH (OUTPATIENT)
Dept: ADMINISTRATIVE | Facility: OTHER | Age: 80
End: 2020-09-03

## 2020-09-03 DIAGNOSIS — E11.22 TYPE 2 DIABETES MELLITUS WITH STAGE 3 CHRONIC KIDNEY DISEASE, WITHOUT LONG-TERM CURRENT USE OF INSULIN: Primary | ICD-10-CM

## 2020-09-03 DIAGNOSIS — N18.30 TYPE 2 DIABETES MELLITUS WITH STAGE 3 CHRONIC KIDNEY DISEASE, WITHOUT LONG-TERM CURRENT USE OF INSULIN: Primary | ICD-10-CM

## 2020-09-03 NOTE — PROGRESS NOTES
Chart reviewed.   Immunizations: Triggered Imm Registry     Orders placed: eye exam   Upcoming appts to satisfy ANTONIO topics: n/a

## 2020-09-25 ENCOUNTER — LAB VISIT (OUTPATIENT)
Dept: LAB | Facility: HOSPITAL | Age: 80
End: 2020-09-25
Attending: INTERNAL MEDICINE
Payer: MEDICARE

## 2020-09-25 ENCOUNTER — ANTI-COAG VISIT (OUTPATIENT)
Dept: CARDIOLOGY | Facility: CLINIC | Age: 80
End: 2020-09-25
Payer: MEDICARE

## 2020-09-25 DIAGNOSIS — Z95.2 H/O MECHANICAL AORTIC VALVE REPLACEMENT: ICD-10-CM

## 2020-09-25 DIAGNOSIS — Z79.01 LONG TERM CURRENT USE OF ANTICOAGULANT THERAPY: ICD-10-CM

## 2020-09-25 LAB
INR PPP: 3.5 (ref 0.8–1.2)
PROTHROMBIN TIME: 36.4 SEC (ref 9–12.5)

## 2020-09-25 PROCEDURE — 93793 ANTICOAG MGMT PT WARFARIN: CPT | Mod: S$GLB,,,

## 2020-09-25 PROCEDURE — 85610 PROTHROMBIN TIME: CPT | Mod: HCNC

## 2020-09-25 PROCEDURE — 36415 COLL VENOUS BLD VENIPUNCTURE: CPT | Mod: HCNC

## 2020-09-25 PROCEDURE — 93793 PR ANTICOAGULANT MGMT FOR PT TAKING WARFARIN: ICD-10-PCS | Mod: S$GLB,,,

## 2020-09-29 ENCOUNTER — OFFICE VISIT (OUTPATIENT)
Dept: CARDIOLOGY | Facility: CLINIC | Age: 80
End: 2020-09-29
Payer: MEDICARE

## 2020-09-29 VITALS
OXYGEN SATURATION: 95 % | SYSTOLIC BLOOD PRESSURE: 142 MMHG | DIASTOLIC BLOOD PRESSURE: 63 MMHG | WEIGHT: 195.56 LBS | HEART RATE: 60 BPM | HEIGHT: 66 IN | BODY MASS INDEX: 31.43 KG/M2

## 2020-09-29 DIAGNOSIS — I25.708 CORONARY ARTERY DISEASE OF BYPASS GRAFT OF NATIVE HEART WITH STABLE ANGINA PECTORIS: Primary | ICD-10-CM

## 2020-09-29 DIAGNOSIS — E66.9 OBESITY (BMI 30.0-34.9): ICD-10-CM

## 2020-09-29 DIAGNOSIS — I10 ESSENTIAL HYPERTENSION: ICD-10-CM

## 2020-09-29 DIAGNOSIS — Z79.01 LONG TERM CURRENT USE OF ANTICOAGULANT THERAPY: ICD-10-CM

## 2020-09-29 DIAGNOSIS — I72.3 ANEURYSM ARTERY, ILIAC: ICD-10-CM

## 2020-09-29 DIAGNOSIS — D63.1 ANEMIA OF CHRONIC RENAL FAILURE, STAGE 3 (MODERATE): ICD-10-CM

## 2020-09-29 DIAGNOSIS — I73.9 PVD (PERIPHERAL VASCULAR DISEASE): ICD-10-CM

## 2020-09-29 DIAGNOSIS — E78.2 MIXED HYPERLIPIDEMIA: ICD-10-CM

## 2020-09-29 DIAGNOSIS — N18.30 ANEMIA OF CHRONIC RENAL FAILURE, STAGE 3 (MODERATE): ICD-10-CM

## 2020-09-29 DIAGNOSIS — E11.51 TYPE 2 DIABETES MELLITUS WITH DIABETIC PERIPHERAL ANGIOPATHY WITHOUT GANGRENE, WITHOUT LONG-TERM CURRENT USE OF INSULIN: ICD-10-CM

## 2020-09-29 DIAGNOSIS — I71.40 ABDOMINAL AORTIC ANEURYSM (AAA) WITHOUT RUPTURE: ICD-10-CM

## 2020-09-29 DIAGNOSIS — I65.23 BILATERAL CAROTID ARTERY STENOSIS: ICD-10-CM

## 2020-09-29 DIAGNOSIS — Z95.2 H/O MECHANICAL AORTIC VALVE REPLACEMENT: ICD-10-CM

## 2020-09-29 DIAGNOSIS — N18.30 CKD (CHRONIC KIDNEY DISEASE) STAGE 3, GFR 30-59 ML/MIN: ICD-10-CM

## 2020-09-29 PROCEDURE — 1159F PR MEDICATION LIST DOCUMENTED IN MEDICAL RECORD: ICD-10-PCS | Mod: HCNC,S$GLB,, | Performed by: PHYSICIAN ASSISTANT

## 2020-09-29 PROCEDURE — 99999 PR PBB SHADOW E&M-EST. PATIENT-LVL IV: ICD-10-PCS | Mod: PBBFAC,HCNC,, | Performed by: PHYSICIAN ASSISTANT

## 2020-09-29 PROCEDURE — 99214 OFFICE O/P EST MOD 30 MIN: CPT | Mod: HCNC,S$GLB,, | Performed by: PHYSICIAN ASSISTANT

## 2020-09-29 PROCEDURE — 1101F PR PT FALLS ASSESS DOC 0-1 FALLS W/OUT INJ PAST YR: ICD-10-PCS | Mod: HCNC,CPTII,S$GLB, | Performed by: PHYSICIAN ASSISTANT

## 2020-09-29 PROCEDURE — 3078F PR MOST RECENT DIASTOLIC BLOOD PRESSURE < 80 MM HG: ICD-10-PCS | Mod: HCNC,CPTII,S$GLB, | Performed by: PHYSICIAN ASSISTANT

## 2020-09-29 PROCEDURE — 99499 UNLISTED E&M SERVICE: CPT | Mod: HCNC,S$GLB,, | Performed by: PHYSICIAN ASSISTANT

## 2020-09-29 PROCEDURE — 1159F MED LIST DOCD IN RCRD: CPT | Mod: HCNC,S$GLB,, | Performed by: PHYSICIAN ASSISTANT

## 2020-09-29 PROCEDURE — 3077F SYST BP >= 140 MM HG: CPT | Mod: HCNC,CPTII,S$GLB, | Performed by: PHYSICIAN ASSISTANT

## 2020-09-29 PROCEDURE — 3077F PR MOST RECENT SYSTOLIC BLOOD PRESSURE >= 140 MM HG: ICD-10-PCS | Mod: HCNC,CPTII,S$GLB, | Performed by: PHYSICIAN ASSISTANT

## 2020-09-29 PROCEDURE — 3078F DIAST BP <80 MM HG: CPT | Mod: HCNC,CPTII,S$GLB, | Performed by: PHYSICIAN ASSISTANT

## 2020-09-29 PROCEDURE — 1126F PR PAIN SEVERITY QUANTIFIED, NO PAIN PRESENT: ICD-10-PCS | Mod: HCNC,S$GLB,, | Performed by: PHYSICIAN ASSISTANT

## 2020-09-29 PROCEDURE — 1101F PT FALLS ASSESS-DOCD LE1/YR: CPT | Mod: HCNC,CPTII,S$GLB, | Performed by: PHYSICIAN ASSISTANT

## 2020-09-29 PROCEDURE — 1126F AMNT PAIN NOTED NONE PRSNT: CPT | Mod: HCNC,S$GLB,, | Performed by: PHYSICIAN ASSISTANT

## 2020-09-29 PROCEDURE — 99499 RISK ADDL DX/OHS AUDIT: ICD-10-PCS | Mod: HCNC,S$GLB,, | Performed by: PHYSICIAN ASSISTANT

## 2020-09-29 PROCEDURE — 99999 PR PBB SHADOW E&M-EST. PATIENT-LVL IV: CPT | Mod: PBBFAC,HCNC,, | Performed by: PHYSICIAN ASSISTANT

## 2020-09-29 PROCEDURE — 99214 PR OFFICE/OUTPT VISIT, EST, LEVL IV, 30-39 MIN: ICD-10-PCS | Mod: HCNC,S$GLB,, | Performed by: PHYSICIAN ASSISTANT

## 2020-09-29 NOTE — PROGRESS NOTES
Interventional Cardiology Clinic Note  Reason for Visit: CAD/PAD  Primary Cardiologist: Dr. Shirley  Last Clinic Visit: 3/18/2020     HPI:   Dariel Urbina is a 80 y.o. male with pmhx of CAD s/p CABG (3/27/18; LIMA-LAD, SVG-OM2 [likely occluded]), porcelain aorta, mechanical aortic valve (~1992/93), suprarenal AAA, PAD, bilateral carotid artery stenosis (L>R), DM, HLD, CKD III, chronic nose bleeds s/p cautery with ENT who presents for 6 month follow up.    Mr. Urbina underwent right carpal tunnel surgery since his last visit which has not helped his numbness at all. He is not interested in doing the left hand surgery. He continues to have stable LÓPEZ and anginal chest pain for years. He actually has not had any angina in the past 6 months and has not needed Nitro. His main limitation to physical activity is chronic back pain. He cannot walk one block before needing to stop. He used to have classic claudication symptoms (tightness in his calves), but now he just gets back/hip pain. The LE edema resolved after being switched to Bumex from Lasix. His weight has been stable at around 191 lbs. He complies with a low sodium diet. The patient is compliant with his medications. He denies palpitations, dizziness, syncope,  PND, orthopnea, difficulty speaking, TIAs, facial weakness, and hemiparesis.     ROS:    Constitution: Negative for decreased appetite, diaphoresis, fever, malaise/fatigue, weight gain and weight loss.   HENT: Negative for congestion, nosebleeds and sore throat.    Eyes: Negative for blurred vision, vision loss in left eye, vision loss in right eye and visual disturbance.  Cardiovascular: Negative for chest pain,  leg swelling, near-syncope, orthopnea, palpitations, paroxysmal nocturnal dyspnea and syncope. Positive for dyspnea on exertion and claudication  Respiratory: Negative for cough, hemoptysis, snoring, shortness of breath and wheezing..    Hematologic/Lymphatic: Negative for bleeding  problem. Does not bruise/bleed easily.   Endocrine: Negative for polyuria  Musculoskeletal: Positive for back and hip pain  Gastrointestinal: Negative for abdominal pain, change in bowel habit, diarrhea, heartburn, hematemesis, hematochezia, melena, nausea and vomiting.   Neurological: Negative for extremity weakness, dizziness, headaches and light-headedness. Positive for bilateral hand numbness  Psychiatric/Behavioral: Negative for depression.   Allergic/Immunologic: Negative for hives.   PMH:     Past Medical History:   Diagnosis Date    Anemia of chronic renal failure, stage 3 (moderate) 5/27/2015    Anticoagulant long-term use     Atherosclerosis of coronary artery bypass graft of native heart without angina pectoris 9/11/2012    3-27-18 [facility] Two vessel coronary artery disease.   Prosthetic aortic valve.   Porcelain aorta.   Patent LIMA graft.    Bilateral carotid artery disease 2/9/2017    Bleeding from the nose     Bleeding nose 3/21/2018    Cataract     CKD (chronic kidney disease) stage 3, GFR 30-59 ml/min 5/27/2015    Claudication of left lower extremity 9/17/2014    Colon polyp     Gastroesophageal reflux disease without esophagitis 3/19/2018    Gastrointestinal hemorrhage associated with intestinal diverticulosis 4/1/2018    H/O mechanical aortic valve replacement 9/17/2014    History of gout 9/26/2012    Hyperparathyroidism due to renal insufficiency 7/27/2015    Internal hemorrhoid 4/3/2018    Long term current use of anticoagulant therapy 9/26/2012    Mechanical heart valve present     Metabolic acidosis with normal anion gap and bicarbonate losses 3/20/2018    Mixed hyperlipidemia 9/26/2012    NSTEMI (non-ST elevated myocardial infarction) 3/21/2018    Obesity, diabetes, and hypertension syndrome 2/23/2016    PVD (peripheral vascular disease) 9/11/2012    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/2012    Type 2 diabetes mellitus with diabetic peripheral angiopathy without gangrene  5/27/2015    Type 2 diabetes mellitus with stage 3 chronic kidney disease, without long-term current use of insulin 10/2/2013     Past Surgical History:   Procedure Laterality Date    CARDIAC CATHETERIZATION      CARDIAC VALVE REPLACEMENT      CARDIAC VALVE SURGERY      CARPAL TUNNEL RELEASE Right 5/19/2020    Procedure: RELEASE, CARPAL TUNNEL;  Surgeon: Rupesh Norris Jr., MD;  Location: [facility];  Service: Plastics;  Laterality: Right;    COLON SURGERY      COLONOSCOPY N/A 3/31/2017    Procedure: COLONOSCOPY;  Surgeon: Bruno Raymond MD;  Location: [facility] ENDO (4TH FLR);  Service: Endoscopy;  Laterality: N/A;  Patient's wife requesting date.    COLONOSCOPY N/A 4/3/2018    Procedure: COLONOSCOPY;  Surgeon: Bonifacio Pelletier MD;  Location: [facility] ENDO (2ND FLR);  Service: Endoscopy;  Laterality: N/A;    COLONOSCOPY N/A 8/13/2018    Procedure: COLONOSCOPY;  Surgeon: Kam Barba MD;  Location: [facility] ENDO (2ND FLR);  Service: Endoscopy;  Laterality: N/A;  2nd floor: PA pressure 49; hx of moderate-severe valve disease     per Coumadin clinic-Patient can hold 5 days with lovenox bridge       ok to schedule per Katarina    CORONARY ANGIOPLASTY      CORONARY ARTERY BYPASS GRAFT      HERNIA REPAIR      SPINE SURGERY      VASECTOMY       Allergies:     Review of patient's allergies indicates:   Allergen Reactions    Fosinopril      Intolerance- elevates potassium level      Losartan      Intolerance- elevates potassium level     Medications:     Current Outpatient Medications on File Prior to Visit   Medication Sig Dispense Refill    allopurinoL (ZYLOPRIM) 100 MG tablet TAKE 1 TABLET EVERY DAY 90 tablet 3    aspirin (ECOTRIN) 81 MG EC tablet Take 1 tablet (81 mg total) by mouth once daily.  0    bumetanide (BUMEX) 2 MG tablet Take 2mg Once to twice daily or as directed 180 tablet 3    carvedilol (COREG) 12.5 MG tablet Take 1 tablet (12.5 mg total) by mouth 2 (two) times daily. 180 tablet 4     glimepiride (AMARYL) 1 MG tablet Take 1 tablet (1 mg total) by mouth every morning. 90 tablet 3    isosorbide mononitrate (IMDUR) 120 MG 24 hr tablet Take 1 tablet (120 mg total) by mouth once daily. 90 tablet 4    NIFEdipine (PROCARDIA-XL) 60 MG (OSM) 24 hr tablet TAKE 1 TABLET EVERY DAY 90 tablet 4    nitroGLYCERIN (NITROSTAT) 0.4 MG SL tablet Place 1 tablet (0.4 mg total) under the tongue every 5 (five) minutes as needed. As needed for chest pain 90 tablet 4    omega-3 acid ethyl esters (LOVAZA) 1 gram capsule Take 2 capsules (2 g total) by mouth 2 (two) times daily. 360 capsule 5    rosuvastatin (CRESTOR) 40 MG Tab Take 1 tablet (40 mg total) by mouth every evening. 90 tablet 3    warfarin (COUMADIN) 5 MG tablet TAKE 1 AND 1/2 TO 2 TABLETS (7.5 TO 10 MG TOTAL) BY  MOUTH DAILY AS DIRECTED BY COUMADIN CLINIC 180 tablet 3    warfarin (COUMADIN) 5 MG tablet Take 2 tablets (10mg) by mouth , then 1.5 tablets (7.5mg) by mouth all other days as instructed by Coumadin Clinic. 143 tablet 0     No current facility-administered medications on file prior to visit.      Social History:     Social History     Tobacco Use    Smoking status: Former Smoker     Packs/day: 1.00     Years: 20.00     Pack years: 20.00     Quit date: 1980     Years since quittin.0    Smokeless tobacco: Never Used   Substance Use Topics    Alcohol use: No     Family History:     Family History   Problem Relation Age of Onset    Heart failure Mother     Heart disease Mother     Heart failure Father     Heart disease Father     Alcohol abuse Father     Heart failure Brother     Heart disease Brother     Diabetes Brother     No Known Problems Sister     No Known Problems Maternal Grandmother     No Known Problems Maternal Grandfather     No Known Problems Paternal Grandmother     No Known Problems Paternal Grandfather     Heart disease Sister     No Known Problems Maternal Aunt     No Known Problems Maternal Uncle  "    No Known Problems Paternal Aunt     No Known Problems Paternal Uncle     Amblyopia Neg Hx     Blindness Neg Hx     Cancer Neg Hx     Cataracts Neg Hx     Glaucoma Neg Hx     Hypertension Neg Hx     Macular degeneration Neg Hx     Retinal detachment Neg Hx     Strabismus Neg Hx     Stroke Neg Hx     Thyroid disease Neg Hx     Anemia Neg Hx     Arrhythmia Neg Hx     Asthma Neg Hx     Clotting disorder Neg Hx     Fainting Neg Hx     Heart attack Neg Hx     Hyperlipidemia Neg Hx     Atrial Septal Defect Neg Hx     Melanoma Neg Hx      Physical Exam:   BP (!) 142/63 (BP Location: Left arm, Patient Position: Sitting, BP Method: Large (Automatic))   Pulse 60   Ht 5' 6" (1.676 m)   Wt 88.7 kg (195 lb 8.8 oz)   SpO2 95%   BMI 31.56 kg/m²      Constitutional: NAD, conversant  HEENT: Sclera anicteric, PERRLA, EOMI  Neck: No JVD, no carotid bruits  CV:Bradycardic, intermittent bigeminal rhythm, 2/6 systolic ejection murmur with radiation to carotids and with mechanical click, normal S1/S2  Vascular: 2+ radial pulses bilaterally, 1+ L DP pulse, +doppler R DP pulse, 0 PT pulses bilaterally   Pulm: Effort normal and breath sounds normal. No respiratory distress. He has no wheezes. He has no rales.   GI: Abdomen soft, NTND, +BS  Extremities: No edema, warm   Skin: No ecchymosis, erythema, or ulcers  Psychiatric: He has a normal mood and affect. His behavior is normal. Judgment and thought content normal.   Neuro: No gross deficits, normal  strength b/l    Labs:     Lab Results   Component Value Date     06/22/2020    K 4.8 06/22/2020     06/22/2020    CO2 27 06/22/2020    BUN 49 (H) 06/22/2020    CREATININE 1.6 (H) 06/22/2020    ANIONGAP 8 06/22/2020     Lab Results   Component Value Date    HGBA1C 5.9 (H) 07/07/2020     Lab Results   Component Value Date     (H) 01/06/2020     (H) 03/20/2018    Lab Results   Component Value Date    WBC 5.69 01/06/2020    HGB 11.9 (L) " 01/06/2020    HCT 35.4 (L) 01/06/2020     (L) 01/06/2020    GRAN 3.1 01/06/2020    GRAN 54.6 01/06/2020     Lab Results   Component Value Date    CHOL 150 06/22/2020    HDL 38 (L) 06/22/2020    LDLCALC 70.0 06/22/2020    TRIG 210 (H) 06/22/2020          Imaging:   Carotid ultrasound 3/18/2020  · There is 20-39% right Internal Carotid Stenosis.  · There is 40-49% left Internal Carotid Stenosis.    TTE 3/6/2020  · Mildly decreased left ventricular systolic function. The estimated ejection fraction is 45%. QEF 44%. Global hypokinetic wall motion.  · Grade II (moderate) left ventricular diastolic dysfunction consistent with pseudonormalization.  · There is a mechanical aortic valve present. Peak velocity = 3.1m/s, acceleration time <100msec, DI 0.27. Trace aortic regurgitation.  · Mild-to-moderate mitral regurgitation.  · Normal right ventricular systolic function.  · Mild tricuspid regurgitation.  · The estimated PA systolic pressure is 50 mmHg.  · Normal central venous pressure (3 mmHg).  · Pulmonary hypertension present.  · Severe left atrial enlargement.    Abdominal aorta ultrasound  There is an Abdominal Aortic Aneurysm largest at the Suprarenal level measuring 3.32 cm.     There is an accelerated PSV at the left common iliac level of 310 cm/s  indicating a possible stenosis.     [facility] 3/27/18  Diagnostic:      Patient has a right dominant coronary artery.      - Left Main Coronary Artery:           The distal LM has a 75% stenosis. There is JAMILAH 3 flow.     - Left Anterior Descending Artery:           The mid LAD has chronic total occlusion. There is JAMILAH 0 flow.     - Left Circumflex Artery:           The LCX is diffusely diseased (75). There is JAMILAH 3 flow.     - Right Coronary Artery:           The RCA has luminal irregularities. There is JAMILAH 3 flow.     - CAMARGO To LAD:           The LIMA to LAD is normal. There is JAMILAH 3 flow.     - SVG To OM2:           The SVG to OM2 was not found.     - Radial:            The radial was not studied.    PET 3/23/18  CONCLUSIONS: ABNORMAL MYOCARDIAL PERFUSION PET STRESS TEST  1. There is a moderate sized mild to moderate ischemia in the base to distal inferolateral wall. This defect comprises 15 % of the left ventricular myocardium.   2. Resting wall motion is physiologic. Stress wall motion is physiologic.   3. LV function is normal at rest and stress.  (normal is >= 51%)  4. The ventricular volumes are normal at rest and stress.   5. The extracardiac distribution of radioactivity is normal.   6. When compared to the previous study from 12/8/16, there is no signficant change.    Assessment:     1. Coronary artery disease of bypass graft of native heart with stable angina pectoris    2. PVD (peripheral vascular disease)    3. Mixed hyperlipidemia    4. H/O mechanical aortic valve replacement    5. Essential hypertension    6. Bilateral carotid artery stenosis    7. Aneurysm artery, iliac    8. Abdominal aortic aneurysm (AAA) without rupture    9. CKD (chronic kidney disease) stage 3, GFR 30-59 ml/min    10. Anemia of chronic renal failure, stage 3 (moderate)    11. Long term current use of anticoagulant therapy    12. Type 2 diabetes mellitus with diabetic peripheral angiopathy without gangrene, without long-term current use of insulin    13. Obesity (BMI 30.0-34.9)      Plan:     Coronary artery disease of bypass graft of native heart with stable angina pectoris  -- Stable  -- Has 15% ischemia in territory of occluded OM2 graft.  -- Continue Aspirin 81 mg daily   -- Continue Crestor 40 mg qhs   -- Continue Imdur 120 mg daily   -- Continue fibrate  -- Continue Nitro PRN.   -- Follow up in 6 months     Mixed hyperlipidemia  -- At goal  -- Continue Crestor 40 mg qhs.   -- Transition to a low salt, mediterranean-style diet which emphasizes:     · Eating primarily plant-based foods, such as fruits and vegetables, whole grains, legumes and nuts   · Replacing butter with healthy fats,  such as olive oil   · Using herbs and spices instead of salt to flavor foods   · Limiting red meat to no more than a few times a month   · Eating fish and poultry at least twice a week      H/O mechanical aortic valve replacement  -- Stable  -- Continue Warfarin. Goal 2.5-3.5     Aneurysm of aorta  -- Abdominal ultrasound from 5/10/18 reviewed. Suprarenal AAA 3.32 cm  -- Schedule surveillance AAA ultrasound    Peripheral arterial disease  -- Dianna 2a symptoms. Denies rest pain, LE wounds.   -- Continue medical management. Intervention unlikely to benefit him given his activity limiting chronic back pain.      Bilateral carotid artery disease  -- Asymptomatic. 40-49% EZEQUIEL stenosis.   -- Continue medical management  -- Annual surveillance ultrasounds. Will repeat at next visit     HTN (hypertension)  -- Uncontrolled. Defer to Dr. Shirley   -- Cont Coreg 12.5 mg bid   -- Cont Nifedipine 60 mg daily       CKD (chronic kidney disease) stage 3, GFR 30-59 ml/min  -- Patient is high risk for any interventional procedures given CKD as well as bleeding risk on Warfarin.   -- Not on ACE/ARB for reduced EF. Defer to Dr. Shirley.      Obesity (BMI 30.0-34.9)  -- Recommend increasing aerobic exercise to the best of his ability and targeted weight loss.        Follow up in 6 months with carotid ultrasound.     Signed:  Chantel Wilder PA-C  Interventional Cardiology   [id number]  9/28/2020 11:05 PM

## 2020-09-30 ENCOUNTER — HOSPITAL ENCOUNTER (OUTPATIENT)
Dept: CARDIOLOGY | Facility: HOSPITAL | Age: 80
Discharge: HOME | End: 2020-09-30
Attending: PHYSICIAN ASSISTANT
Payer: MEDICARE

## 2020-09-30 DIAGNOSIS — I71.40 ABDOMINAL AORTIC ANEURYSM (AAA) WITHOUT RUPTURE: ICD-10-CM

## 2020-09-30 LAB
ABDOMINAL IMA AP: 2.1 CM
ABDOMINAL IMA ED VEL: 0 CM/S
ABDOMINAL IMA PS VEL: 63 CM/S
ABDOMINAL IMA TRANS: 2.13 CM
ABDOMINAL INFRARENAL AORTA AP: 2.41 CM
ABDOMINAL INFRARENAL AORTA ED VEL: 0 CM/S
ABDOMINAL INFRARENAL AORTA PS VEL: 42 CM/S
ABDOMINAL INFRARENAL AORTA TRANS: 2.44 CM
ABDOMINAL JUXTARENAL AORTA AP: 2.82 CM
ABDOMINAL JUXTARENAL AORTA ED VEL: 0 CM/S
ABDOMINAL JUXTARENAL AORTA PS VEL: 62 CM/S
ABDOMINAL JUXTARENAL AORTA TRANS: 2.84 CM
ABDOMINAL LT COM ILIAC AP: 1.13 CM
ABDOMINAL LT COM ILIAC TRANS: 1.22 CM
ABDOMINAL LT COM ILIAC VEL: 371 CM/S
ABDOMINAL LT COM ILLIAC ED VEL: 0 CM/S
ABDOMINAL RT COM ILIAC AP: 1.23 CM
ABDOMINAL RT COM ILIAC TRANS: 1.46 CM
ABDOMINAL SUPRARENAL AORTA AP: 3.11 CM
ABDOMINAL SUPRARENAL AORTA ED VEL: 15 CM/S
ABDOMINAL SUPRARENAL AORTA PS VEL: 51 CM/S
ABDOMINAL SUPRARENAL AORTA TRANS: 3.2 CM
OHS CV US ABDOMINAL AORTA PSV HIGHEST VALUE: 371 CM/S

## 2020-09-30 PROCEDURE — 93978 CV US ABDOMINAL AORTA EVALUATION (CUPID ONLY): ICD-10-PCS | Mod: 26,HCNC,, | Performed by: INTERNAL MEDICINE

## 2020-09-30 PROCEDURE — 93978 VASCULAR STUDY: CPT | Mod: HCNC

## 2020-09-30 PROCEDURE — 93978 VASCULAR STUDY: CPT | Mod: 26,HCNC,, | Performed by: INTERNAL MEDICINE

## 2020-10-01 ENCOUNTER — TELEPHONE (OUTPATIENT)
Dept: CARDIOLOGY | Facility: CLINIC | Age: 80
End: 2020-10-01

## 2020-10-01 NOTE — TELEPHONE ENCOUNTER
I called Mr. Urbina gave him the information of the U/S he ask that I give it to him wife she voice her understanding.    Jazzmine     Message  Received: Yesterday  Message Contents   TOSHIA Seay Staff             Please let patient know his ultrasound was negative for an aneurysm

## 2020-10-06 NOTE — PROGRESS NOTES
"         Cardiology Clinic Note  Reason for Visit: CAD s/p CABG, HF    HPI:     Dariel Urbina is a 80 y.o. M, who for follow up of HF symptoms.    No bleeding on warfarin.    He gets short winded and has a "small pain in chest" when lifting up heavy objects, decreases with rest.  He can walk around the house without symptoms.  No nitro use lately.    Lower extremity edema much improved, stable.  Doing well on bumex.    No symptoms of arrhythmia.    He has pains in his legs with walking about 50-60 feet. Stable, not progressing.  Pains were initially classic for claudication. He has cramping in the calf, which improves with rest.  Now, he reports pain goes into his hip and back.     Medical: CAD s/p CABG (3/27/18; LIMA-LAD, SVG-OM2 [likely occluded]), porcelain aorta, mechanical aortic valve (~1992/93), suprarenal AAA, bilateral carotid artery stenosis (L>R), DM, HLD, CKD III, chronic nose bleeds s/p cautery with ENT   Surgical: CABG, hernia, spine, vasectomy, aortic valve replacement  Family: mother, father, and brother with heart disease/heart failure   Social: previously smoked 1 PPD x20y (quit 1980), no alcohol use  Side effects/allergies: fosinopril caused hyperK, losartan caused hyperK    ROS:    Constitution: Negative for fever or chills.  HENT: Negative for  headaches.  Eyes: Negative for blurred vision.   Cardiovascular: See above  Pulmonary: Positive for SOB. Negative for cough.   Gastrointestinal: Negative for nausea/vomiting.   : Negative for dysuria.   Skin: Negative for rashes.  Neurological: Negative for focal weakness.  Psychological: Negative for depression.  PMH:     Past Medical History:   Diagnosis Date    Anemia of chronic renal failure, stage 3 (moderate) 5/27/2015    Anticoagulant long-term use     Atherosclerosis of coronary artery bypass graft of native heart without angina pectoris 9/11/2012    3-27-18 [facility] Two vessel coronary artery disease.   Prosthetic aortic valve.   Porcelain " aorta.   Patent LIMA graft.    Bilateral carotid artery disease 2/9/2017    Bleeding from the nose     Bleeding nose 3/21/2018    Cataract     CKD (chronic kidney disease) stage 3, GFR 30-59 ml/min 5/27/2015    Claudication of left lower extremity 9/17/2014    Colon polyp     Gastroesophageal reflux disease without esophagitis 3/19/2018    Gastrointestinal hemorrhage associated with intestinal diverticulosis 4/1/2018    H/O mechanical aortic valve replacement 9/17/2014    History of gout 9/26/2012    Hyperparathyroidism due to renal insufficiency 7/27/2015    Internal hemorrhoid 4/3/2018    Long term current use of anticoagulant therapy 9/26/2012    Mechanical heart valve present     Metabolic acidosis with normal anion gap and bicarbonate losses 3/20/2018    Mixed hyperlipidemia 9/26/2012    NSTEMI (non-ST elevated myocardial infarction) 3/21/2018    Obesity, diabetes, and hypertension syndrome 2/23/2016    PVD (peripheral vascular disease) 9/11/2012    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/2012    Type 2 diabetes mellitus with diabetic peripheral angiopathy without gangrene 5/27/2015    Type 2 diabetes mellitus with stage 3 chronic kidney disease, without long-term current use of insulin 10/2/2013     Past Surgical History:   Procedure Laterality Date    CARDIAC CATHETERIZATION      CARDIAC VALVE REPLACEMENT      CARDIAC VALVE SURGERY      CARPAL TUNNEL RELEASE Right 5/19/2020    Procedure: RELEASE, CARPAL TUNNEL;  Surgeon: Rupesh Norris Jr., MD;  Location: [facility];  Service: Plastics;  Laterality: Right;    COLON SURGERY      COLONOSCOPY N/A 3/31/2017    Procedure: COLONOSCOPY;  Surgeon: Bruno Raymond MD;  Location: [facility] (4TH FLR);  Service: Endoscopy;  Laterality: N/A;  Patient's wife requesting date.    COLONOSCOPY N/A 4/3/2018    Procedure: COLONOSCOPY;  Surgeon: Bonifacio Pelletier MD;  Location: [facility] ENDO (2ND FLR);  Service: Endoscopy;  Laterality: N/A;    COLONOSCOPY N/A  8/13/2018    Procedure: COLONOSCOPY;  Surgeon: Kam Barba MD;  Location: [facility] ([address] [place] [address]);  Service: Endoscopy;  Laterality: N/A;  2nd floor: PA pressure 49; hx of moderate-severe valve disease     per Coumadin clinic-Patient can hold 5 days with lovenox bridge       ok to schedule per [facility]    CORONARY ANGIOPLASTY      CORONARY ARTERY BYPASS GRAFT      HERNIA REPAIR      SPINE SURGERY      VASECTOMY       Allergies:     Review of patient's allergies indicates:   Allergen Reactions    Fosinopril      Intolerance- elevates potassium level      Losartan      Intolerance- elevates potassium level     Medications:     Current Outpatient Medications on File Prior to Visit   Medication Sig Dispense Refill    allopurinoL (ZYLOPRIM) 100 MG tablet TAKE 1 TABLET EVERY DAY 90 tablet 3    aspirin (ECOTRIN) 81 MG EC tablet Take 1 tablet (81 mg total) by mouth once daily.  0    bumetanide (BUMEX) 2 MG tablet Take 2mg Once to twice daily or as directed 180 tablet 3    carvedilol (COREG) 12.5 MG tablet Take 1 tablet (12.5 mg total) by mouth 2 (two) times daily. 180 tablet 4    glimepiride (AMARYL) 1 MG tablet Take 1 tablet (1 mg total) by mouth every morning. 90 tablet 3    isosorbide mononitrate (IMDUR) 120 MG 24 hr tablet Take 1 tablet (120 mg total) by mouth once daily. 90 tablet 4    NIFEdipine (PROCARDIA-XL) 60 MG (OSM) 24 hr tablet TAKE 1 TABLET EVERY DAY 90 tablet 4    nitroGLYCERIN (NITROSTAT) 0.4 MG SL tablet Place 1 tablet (0.4 mg total) under the tongue every 5 (five) minutes as needed. As needed for chest pain 90 tablet 4    omega-3 acid ethyl esters (LOVAZA) 1 gram capsule Take 2 capsules (2 g total) by mouth 2 (two) times daily. 360 capsule 5    rosuvastatin (CRESTOR) 40 MG Tab Take 1 tablet (40 mg total) by mouth every evening. 90 tablet 3    warfarin (COUMADIN) 5 MG tablet TAKE 1 AND 1/2 TO 2 TABLETS (7.5 TO 10 MG TOTAL) BY  MOUTH DAILY AS DIRECTED BY COUMADIN CLINIC 180 tablet 3     "warfarin (COUMADIN) 5 MG tablet Take 2 tablets (10mg) by mouth , then 1.5 tablets (7.5mg) by mouth all other days as instructed by Coumadin Clinic. 143 tablet 0     No current facility-administered medications on file prior to visit.      Social History:     Social History     Tobacco Use    Smoking status: Former Smoker     Packs/day: 1.00     Years: 20.00     Pack years: 20.00     Quit date: 1980     Years since quittin.0    Smokeless tobacco: Never Used   Substance Use Topics    Alcohol use: No     Family History:     Family History   Problem Relation Age of Onset    Heart failure Mother     Heart disease Mother     Heart failure Father     Heart disease Father     Alcohol abuse Father     Heart failure Brother     Heart disease Brother     Diabetes Brother     No Known Problems Sister     No Known Problems Maternal Grandmother     No Known Problems Maternal Grandfather     No Known Problems Paternal Grandmother     No Known Problems Paternal Grandfather     Heart disease Sister     No Known Problems Maternal Aunt     No Known Problems Maternal Uncle     No Known Problems Paternal Aunt     No Known Problems Paternal Uncle     Amblyopia Neg Hx     Blindness Neg Hx     Cancer Neg Hx     Cataracts Neg Hx     Glaucoma Neg Hx     Hypertension Neg Hx     Macular degeneration Neg Hx     Retinal detachment Neg Hx     Strabismus Neg Hx     Stroke Neg Hx     Thyroid disease Neg Hx     Anemia Neg Hx     Arrhythmia Neg Hx     Asthma Neg Hx     Clotting disorder Neg Hx     Fainting Neg Hx     Heart attack Neg Hx     Hyperlipidemia Neg Hx     Atrial Septal Defect Neg Hx     Melanoma Neg Hx      Physical Exam:   /78   Pulse 63   Ht 5' 6" (1.676 m)   Wt 89 kg (196 lb 3.4 oz)   SpO2 97%   BMI 31.67 kg/m²      Constitutional: No apparent distress, conversant  HEENT: Sclera anicteric, extraocular movements intact  Neck: No JVD, no carotid bruits  CV: Regular rate " and rhythm, 2/6 systolic murmur at RUSB with valve clicks, 2/6 holosystolic murmur at apex  Pulm: Clear to auscultation  GI: Abdomen soft, no palpable masses  Extremities: No edema, warm with faint pulses  Skin: No ecchymosis, erythema, or ulcers  Psych: Alert and oriented to person place location, appropriate affect  Neuro: No focal deficits    Labs:     Blood Tests:  Lab Results   Component Value Date     (H) 01/06/2020     06/22/2020    K 4.8 06/22/2020     06/22/2020    CO2 27 06/22/2020    BUN 49 (H) 06/22/2020    CREATININE 1.6 (H) 06/22/2020     (H) 06/22/2020    HGBA1C 5.9 (H) 07/07/2020    MG 1.8 11/19/2019    AST 28 06/22/2020    ALT 30 06/22/2020    ALBUMIN 3.8 06/22/2020    PROT 7.5 06/22/2020    BILITOT 0.4 06/22/2020    WBC 5.69 01/06/2020    HGB 11.9 (L) 01/06/2020    HCT 35.4 (L) 01/06/2020    MCV 92 01/06/2020     (L) 01/06/2020    INR 3.5 (H) 09/25/2020    INR 2.0 (H) 03/15/2010    PSA 0.35 07/27/2012    TSH 3.029 02/02/2017       Lab Results   Component Value Date    CHOL 150 06/22/2020    HDL 38 (L) 06/22/2020    TRIG 210 (H) 06/22/2020       Lab Results   Component Value Date    LDLCALC 70.0 06/22/2020       Urine Tests:  Lab Results   Component Value Date    COLORU Yellow 11/19/2019    APPEARANCEUA Clear 11/19/2019    PHUR 5.0 11/19/2019    SPECGRAV 1.015 11/19/2019    PROTEINUA 2+ (A) 11/19/2019    GLUCUA Negative 11/19/2019    KETONESU Negative 11/19/2019    BILIRUBINUA Negative 11/19/2019    OCCULTUA Negative 11/19/2019    NITRITE Negative 11/19/2019    UROBILINOGEN Negative 04/01/2018    LEUKOCYTESUR Negative 11/19/2019    PROTEINURINE 126 (H) 11/19/2019    PROTEINURINE 8 03/21/2018    CREATRANDUR 102.0 11/19/2019    UTPCR 1.24 (H) 11/19/2019       Imaging:     Echocardiogram  TTE 3/6/20  · Mildly decreased left ventricular systolic function. The estimated ejection fraction is 45%. QEF 44%. Global hypokinetic wall motion.  · Grade II (moderate) left  ventricular diastolic dysfunction consistent with pseudonormalization.  · There is a mechanical aortic valve present. Peak velocity = 3.1m/s, acceleration time <100msec, DI 0.27. Trace aortic regurgitation.  · Mild-to-moderate mitral regurgitation.  · Normal right ventricular systolic function.  · Mild tricuspid regurgitation.  · The estimated PA systolic pressure is 50 mmHg.  · Normal central venous pressure (3 mmHg).  · Pulmonary hypertension present.   · Severe left atrial enlargement.    TTE 1/21/20  · Mildly decreased left ventricular systolic function. The estimated ejection fraction is 45%.  · Eccentric left ventricular hypertrophy.  · Septal wall has abnormal motion consistent with left bundle branch block.  · Grade I (mild) left ventricular diastolic dysfunction consistent with impaired relaxation.  · Normal right ventricular systolic function.  · Severe left atrial enlargement.  · Mild-to-moderate mitral regurgitation.  · There is a mechanical aortic valve present., Mean gradient = 12 mmHg,. DI= 0.38. Trace to mild centeral AI.  · The estimated PA systolic pressure is 35 mmHg.  · Normal central venous pressure (3 mmHg).    TTE 4/26/18  CONCLUSIONS     1 - Mild left ventricular enlargement.     2 - Low normal to mildly depressed left ventricular systolic function (EF 50-55%).     3 - Eccentric hypertrophy.     4 - Wall motion abnormalities.     5 - Impaired LV relaxation, elevated LAP (grade 2 diastolic dysfunction).     6 - Biatrial enlargement.     7 - Right ventricular enlargement with low normal systolic function.     8 - Aortic valve prosthesis, MATIAS = 0.96 cm2, AVAi = 0.5 cm2/m2, peak velocity = 2.62 m/s, mean gradient = 17 mmHg.     9 - Trivial aortic regurgitation.     10 - Moderate to moderate-severe (2-3+) mitral regurgitation.     11 - Moderate tricuspid regurgitation.     12 - Mild pulmonic regurgitation.     13 - Pulmonary hypertension. The estimated PA systolic pressure is 49 mmHg.      TTE  3/21/18  CONCLUSIONS     1 - Limited portable study.     2 - Low normal to mildly depressed left ventricular systolic function (EF 50-55%).     3 - Normal right ventricular systolic function .     4 - Normal left ventricular diastolic function.     5 - Mild mitral regurgitation.     6 - Aortic valve mechanical prosthesis - no Doppler interrogation performed.     7 - Consider full color Doppler study if clinically indicated.     TTE 5/3/17  CONCLUSIONS     1 - Eccentric LVH with normal left ventricular systolic function (EF 60-65%).     2 - Severe left atrial enlargement.     3 - Normal right ventricular systolic function .     4 - Aortic valve mechanical prosthesis with a mean gradient of 17mmHg.     5 - Moderate to severe mitral regurgitation.     6 - Moderate tricuspid regurgitation.     7 - Pulmonary hypertension. The estimated PA systolic pressure is 54 mmHg.     Stress testing  PET 3/23/18  CONCLUSIONS: ABNORMAL MYOCARDIAL PERFUSION PET STRESS TEST  1. There is a moderate sized mild to moderate ischemia in the base to distal inferolateral wall. This defect comprises 15 % of the left ventricular myocardium.   2. Resting wall motion is physiologic. Stress wall motion is physiologic.   3. LV function is normal at rest and stress.  (normal is >= 51%)  4. The ventricular volumes are normal at rest and stress.   5. The extracardiac distribution of radioactivity is normal.   6. When compared to the previous study from 12/8/16, there is no signficant change.    PET 12/8/16  CONCLUSIONS: ABNORMAL MYOCARDIAL PERFUSION PET STRESS TEST  1. There is a small sized mild ischemia in the distal inferolateral wall in the usual distribution of the distal Left Circumflex Artery. This defect comprises 10 % of the left ventricular myocardium.   2. Resting wall motion is physiologic. Stress wall motion is physiologic.   3. Visually estimated LV function is normal at rest and normal at stress.   4. The ventricular volumes are normal  at rest and stress.   5. The extracardiac distribution of radioactivity is normal.   6. There was no previous study available to compare.    Cath Lab  [facility] 3/27/18  Diagnostic:      Patient has a right dominant coronary artery.      - Left Main Coronary Artery:           The distal LM has a 75% stenosis. There is JAMILAH 3 flow.     - Left Anterior Descending Artery:           The mid LAD has chronic total occlusion. There is JAMILAH 0 flow.     - Left Circumflex Artery:           The LCX is diffusely diseased (75). There is JAMILAH 3 flow.     - Right Coronary Artery:           The RCA has luminal irregularities. There is JAMILAH 3 flow.     - CAMARGO To LAD:           The LIMA to LAD is normal. There is JAMILAH 3 flow.     - SVG To OM2:           The SVG to OM2 was not found.     - Radial:           The radial was not studied.    Other  Carotid US 3/18/20  Right Carotid The right distal common carotid artery is visualized. There is mild heterogeneous plaque in the right distal common carotid artery.     The right carotid bulb artery is visualized. There is mild heterogeneous plaque in the right carotid bulb artery.     The right proximal internal carotid artery has 20-39% stenosis. There is moderate heterogeneous plaque in the right proximal internal carotid artery.     There is acceleration in the right external carotid artery. There is severe heterogeneous plaque in the right external carotid artery.     The right vertebral artery is visualized.   The right vertebral artery is associated with anterograde flow.     The highest right ICA velocity divided by the highest right CCA velocity is 1.88 .   Left Carotid The left distal common carotid artery is visualized. There is mild heterogeneous plaque in the left distal common carotid artery.    The left carotid bulb artery is visualized. There is mild calcific plaque in the left carotid bulb artery.    The left proximal internal carotid artery has 40-49% stenosis. There is mild  heterogeneous plaque in the left proximal internal carotid artery.    The left external carotid artery is visualized.     The left vertebral artery is visualized.   The left vertebral artery is associated with anterograde flow.     The highest left ICA velocity divided by the highest left CCA velocity is 2.03 .     Abdominal aorta 5/10/18  CONCLUSIONS   There is an Abdominal Aortic Aneurysm largest at the Suprarenal level measuring 3.32 cm.  There is an accelerated PSV at the left common iliac level of 310 cm/s  indicating a possible stenosis.    Abdominal aorta 17  CONCLUSIONS   There is an Abdominal Aortic Aneurysm largest at the Suprarenal level measuring 3.35 cm.  There is an accelerated PSV at the left common iliac level of 317 cm/s  indicating a possible stenosis.    EK/1/18  Sinus bradycardia with 1st degree A-V block with occasional Premature ventricular complexes  Left atrial abnormality/enlargement  Left bundle branch block    Assessment:     1. Chronic heart failure, unspecified heart failure type    2. Coronary artery disease of bypass graft of native heart with stable angina pectoris    3. Bilateral carotid artery stenosis    4. Aneurysm artery, iliac    5. Abdominal aortic aneurysm (AAA) without rupture    6. H/O mechanical aortic valve replacement    7. Essential hypertension    8. Mixed hyperlipidemia    9. PVD (peripheral vascular disease)    10. Renovascular hypertension    11. Anemia of chronic renal failure, stage 3 (moderate), unspecified whether stage 3a or 3b CKD    12. Type 2 diabetes mellitus with stage 3 chronic kidney disease, without long-term current use of insulin, unspecified whether stage 3a or 3b CKD         Plan:     Shortness of breath  Chronic heart failure with mid range EF  Stable, euvolemic  Continue bumex 2mg daily with extra dose prn.  Continue coreg 12.5mg BID  Start lisinopril 2.5mg daily. Check BMP in 2 weeks due to prior hyperkalemia with ACEi/ARBs in the  past.    Coronary artery disease of bypass graft of native heart with stable angina pectoris  Has 15% ischemia in territory of occluded OM2 graft.   Angina is stable.   Continue current medications    H/O mechanical aortic valve replacement  Stable  Echo with normal AVR function  Repeat echo at next visit  Continue warfarin    Bilateral carotid artery stenosis  Mixed hyperlipidemia  PVD (peripheral vascular disease)  Abdominal aortic aneurysm (AAA) without rupture  Stable, LDL at goal  Continue current meds    To note, he is not interested in further interventions. Will not pursue imaging of lower extremities or carotids at this time.    Essential hypertension  CKD (chronic kidney disease) stage 3, GFR 30-59 ml/min  BP at goal  Continue current medications  Add low dose lisinopril, as above    Obesity (BMI 30.0-34.9)  Type 2 diabetes mellitus with stage 3 chronic kidney disease, without long-term current use of insulin  Followed by PCP    Signed:  Yuriy Shirley MD  Cardiology     10/20/2020 9:00 AM    Follow-up:     Future Appointments   Date Time Provider Department Center   10/8/2020  8:00 AM LAB, APPOINTMENT [facility] LAB  [facility]   10/20/2020  8:30 AM Liborio Shirley III, MD [facility] CARDIO [facility]

## 2020-10-08 ENCOUNTER — ANTI-COAG VISIT (OUTPATIENT)
Dept: CARDIOLOGY | Facility: CLINIC | Age: 80
End: 2020-10-08
Payer: MEDICARE

## 2020-10-08 ENCOUNTER — LAB VISIT (OUTPATIENT)
Dept: LAB | Facility: HOSPITAL | Age: 80
End: 2020-10-08
Attending: INTERNAL MEDICINE
Payer: MEDICARE

## 2020-10-08 DIAGNOSIS — Z79.01 LONG TERM CURRENT USE OF ANTICOAGULANT THERAPY: ICD-10-CM

## 2020-10-08 DIAGNOSIS — Z95.2 H/O MECHANICAL AORTIC VALVE REPLACEMENT: ICD-10-CM

## 2020-10-08 LAB
INR PPP: 3.7 (ref 0.8–1.2)
PROTHROMBIN TIME: 38.5 SEC (ref 9–12.5)

## 2020-10-08 PROCEDURE — 85610 PROTHROMBIN TIME: CPT | Mod: HCNC

## 2020-10-08 PROCEDURE — 93793 ANTICOAG MGMT PT WARFARIN: CPT | Mod: S$GLB,,,

## 2020-10-08 PROCEDURE — 93793 PR ANTICOAGULANT MGMT FOR PT TAKING WARFARIN: ICD-10-PCS | Mod: S$GLB,,,

## 2020-10-08 PROCEDURE — 36415 COLL VENOUS BLD VENIPUNCTURE: CPT | Mod: HCNC

## 2020-10-08 NOTE — PROGRESS NOTES
INR not at goal. Medications, chart, and patient findings reviewed. See calendar for adjustments to dose and follow up plan.  Pt INR continues to rise despite holding & decreased dose.  Plan to decrease dose further & re-assess in 1 week.  Pt denies any changes.  Will continue to monitor closely.

## 2020-10-15 ENCOUNTER — ANTI-COAG VISIT (OUTPATIENT)
Dept: CARDIOLOGY | Facility: CLINIC | Age: 80
End: 2020-10-15
Payer: MEDICARE

## 2020-10-15 ENCOUNTER — LAB VISIT (OUTPATIENT)
Dept: LAB | Facility: HOSPITAL | Age: 80
End: 2020-10-15
Attending: INTERNAL MEDICINE
Payer: MEDICARE

## 2020-10-15 DIAGNOSIS — Z79.01 LONG TERM CURRENT USE OF ANTICOAGULANT THERAPY: ICD-10-CM

## 2020-10-15 DIAGNOSIS — Z95.2 H/O MECHANICAL AORTIC VALVE REPLACEMENT: ICD-10-CM

## 2020-10-15 LAB
INR PPP: 2.5 (ref 0.8–1.2)
PROTHROMBIN TIME: 26.9 SEC (ref 9–12.5)

## 2020-10-15 PROCEDURE — 93793 PR ANTICOAGULANT MGMT FOR PT TAKING WARFARIN: ICD-10-PCS | Mod: S$GLB,,,

## 2020-10-15 PROCEDURE — 93793 ANTICOAG MGMT PT WARFARIN: CPT | Mod: S$GLB,,,

## 2020-10-15 PROCEDURE — 36415 COLL VENOUS BLD VENIPUNCTURE: CPT | Mod: HCNC

## 2020-10-15 PROCEDURE — 85610 PROTHROMBIN TIME: CPT | Mod: HCNC

## 2020-10-18 ENCOUNTER — PATIENT OUTREACH (OUTPATIENT)
Dept: ADMINISTRATIVE | Facility: OTHER | Age: 80
End: 2020-10-18

## 2020-10-18 NOTE — PROGRESS NOTES
Requested updates within Care Everywhere.  Patient's chart was reviewed for overdue ANTONIO topics.  Immunizations reconciled.    Orders placed:n/a  Tasked appts:n/a  Labs Linked:n/a

## 2020-10-20 ENCOUNTER — OFFICE VISIT (OUTPATIENT)
Dept: CARDIOLOGY | Facility: CLINIC | Age: 80
End: 2020-10-20
Payer: MEDICARE

## 2020-10-20 VITALS
WEIGHT: 196.19 LBS | HEART RATE: 63 BPM | HEIGHT: 66 IN | SYSTOLIC BLOOD PRESSURE: 122 MMHG | BODY MASS INDEX: 31.53 KG/M2 | DIASTOLIC BLOOD PRESSURE: 78 MMHG | OXYGEN SATURATION: 97 %

## 2020-10-20 DIAGNOSIS — E11.22 TYPE 2 DIABETES MELLITUS WITH STAGE 3 CHRONIC KIDNEY DISEASE, WITHOUT LONG-TERM CURRENT USE OF INSULIN, UNSPECIFIED WHETHER STAGE 3A OR 3B CKD: ICD-10-CM

## 2020-10-20 DIAGNOSIS — Z95.2 H/O MECHANICAL AORTIC VALVE REPLACEMENT: ICD-10-CM

## 2020-10-20 DIAGNOSIS — E78.2 MIXED HYPERLIPIDEMIA: ICD-10-CM

## 2020-10-20 DIAGNOSIS — I50.9 CHRONIC HEART FAILURE, UNSPECIFIED HEART FAILURE TYPE: Primary | ICD-10-CM

## 2020-10-20 DIAGNOSIS — I15.0 RENOVASCULAR HYPERTENSION: ICD-10-CM

## 2020-10-20 DIAGNOSIS — N18.30 TYPE 2 DIABETES MELLITUS WITH STAGE 3 CHRONIC KIDNEY DISEASE, WITHOUT LONG-TERM CURRENT USE OF INSULIN, UNSPECIFIED WHETHER STAGE 3A OR 3B CKD: ICD-10-CM

## 2020-10-20 DIAGNOSIS — I65.23 BILATERAL CAROTID ARTERY STENOSIS: ICD-10-CM

## 2020-10-20 DIAGNOSIS — D63.1 ANEMIA OF CHRONIC RENAL FAILURE, STAGE 3 (MODERATE), UNSPECIFIED WHETHER STAGE 3A OR 3B CKD: ICD-10-CM

## 2020-10-20 DIAGNOSIS — I10 ESSENTIAL HYPERTENSION: ICD-10-CM

## 2020-10-20 DIAGNOSIS — I71.40 ABDOMINAL AORTIC ANEURYSM (AAA) WITHOUT RUPTURE: ICD-10-CM

## 2020-10-20 DIAGNOSIS — N18.30 ANEMIA OF CHRONIC RENAL FAILURE, STAGE 3 (MODERATE), UNSPECIFIED WHETHER STAGE 3A OR 3B CKD: ICD-10-CM

## 2020-10-20 DIAGNOSIS — I73.9 PVD (PERIPHERAL VASCULAR DISEASE): ICD-10-CM

## 2020-10-20 DIAGNOSIS — I72.3 ANEURYSM ARTERY, ILIAC: ICD-10-CM

## 2020-10-20 DIAGNOSIS — I25.708 CORONARY ARTERY DISEASE OF BYPASS GRAFT OF NATIVE HEART WITH STABLE ANGINA PECTORIS: ICD-10-CM

## 2020-10-20 PROCEDURE — 1126F PR PAIN SEVERITY QUANTIFIED, NO PAIN PRESENT: ICD-10-PCS | Mod: HCNC,S$GLB,, | Performed by: INTERNAL MEDICINE

## 2020-10-20 PROCEDURE — 1101F PR PT FALLS ASSESS DOC 0-1 FALLS W/OUT INJ PAST YR: ICD-10-PCS | Mod: HCNC,CPTII,S$GLB, | Performed by: INTERNAL MEDICINE

## 2020-10-20 PROCEDURE — 1159F PR MEDICATION LIST DOCUMENTED IN MEDICAL RECORD: ICD-10-PCS | Mod: HCNC,S$GLB,, | Performed by: INTERNAL MEDICINE

## 2020-10-20 PROCEDURE — 3074F PR MOST RECENT SYSTOLIC BLOOD PRESSURE < 130 MM HG: ICD-10-PCS | Mod: HCNC,CPTII,S$GLB, | Performed by: INTERNAL MEDICINE

## 2020-10-20 PROCEDURE — 99999 PR PBB SHADOW E&M-EST. PATIENT-LVL IV: ICD-10-PCS | Mod: PBBFAC,HCNC,, | Performed by: INTERNAL MEDICINE

## 2020-10-20 PROCEDURE — 99999 PR PBB SHADOW E&M-EST. PATIENT-LVL IV: CPT | Mod: PBBFAC,HCNC,, | Performed by: INTERNAL MEDICINE

## 2020-10-20 PROCEDURE — 99214 OFFICE O/P EST MOD 30 MIN: CPT | Mod: HCNC,S$GLB,, | Performed by: INTERNAL MEDICINE

## 2020-10-20 PROCEDURE — 3074F SYST BP LT 130 MM HG: CPT | Mod: HCNC,CPTII,S$GLB, | Performed by: INTERNAL MEDICINE

## 2020-10-20 PROCEDURE — 99499 UNLISTED E&M SERVICE: CPT | Mod: S$GLB,,, | Performed by: INTERNAL MEDICINE

## 2020-10-20 PROCEDURE — 1126F AMNT PAIN NOTED NONE PRSNT: CPT | Mod: HCNC,S$GLB,, | Performed by: INTERNAL MEDICINE

## 2020-10-20 PROCEDURE — 3078F PR MOST RECENT DIASTOLIC BLOOD PRESSURE < 80 MM HG: ICD-10-PCS | Mod: HCNC,CPTII,S$GLB, | Performed by: INTERNAL MEDICINE

## 2020-10-20 PROCEDURE — 3078F DIAST BP <80 MM HG: CPT | Mod: HCNC,CPTII,S$GLB, | Performed by: INTERNAL MEDICINE

## 2020-10-20 PROCEDURE — 99214 PR OFFICE/OUTPT VISIT, EST, LEVL IV, 30-39 MIN: ICD-10-PCS | Mod: HCNC,S$GLB,, | Performed by: INTERNAL MEDICINE

## 2020-10-20 PROCEDURE — 1159F MED LIST DOCD IN RCRD: CPT | Mod: HCNC,S$GLB,, | Performed by: INTERNAL MEDICINE

## 2020-10-20 PROCEDURE — 1101F PT FALLS ASSESS-DOCD LE1/YR: CPT | Mod: HCNC,CPTII,S$GLB, | Performed by: INTERNAL MEDICINE

## 2020-10-20 PROCEDURE — 99499 RISK ADDL DX/OHS AUDIT: ICD-10-PCS | Mod: S$GLB,,, | Performed by: INTERNAL MEDICINE

## 2020-10-20 RX ORDER — LISINOPRIL 2.5 MG/1
2.5 TABLET ORAL DAILY
Qty: 90 TABLET | Refills: 3 | Status: SHIPPED | OUTPATIENT
Start: 2020-10-20 | End: 2021-08-02

## 2020-10-28 ENCOUNTER — LAB VISIT (OUTPATIENT)
Dept: LAB | Facility: HOSPITAL | Age: 80
End: 2020-10-28
Attending: INTERNAL MEDICINE
Payer: MEDICARE

## 2020-10-28 DIAGNOSIS — Z79.01 LONG TERM CURRENT USE OF ANTICOAGULANT THERAPY: ICD-10-CM

## 2020-10-28 DIAGNOSIS — Z95.2 H/O MECHANICAL AORTIC VALVE REPLACEMENT: ICD-10-CM

## 2020-10-28 LAB
INR PPP: 2.4 (ref 0.8–1.2)
PROTHROMBIN TIME: 25.8 SEC (ref 9–12.5)

## 2020-10-28 PROCEDURE — 36415 COLL VENOUS BLD VENIPUNCTURE: CPT | Mod: HCNC

## 2020-10-28 PROCEDURE — 85610 PROTHROMBIN TIME: CPT | Mod: HCNC

## 2020-10-29 ENCOUNTER — ANTI-COAG VISIT (OUTPATIENT)
Dept: CARDIOLOGY | Facility: CLINIC | Age: 80
End: 2020-10-29
Payer: MEDICARE

## 2020-10-29 DIAGNOSIS — Z95.2 H/O MECHANICAL AORTIC VALVE REPLACEMENT: ICD-10-CM

## 2020-10-29 DIAGNOSIS — Z79.01 LONG TERM CURRENT USE OF ANTICOAGULANT THERAPY: ICD-10-CM

## 2020-10-29 PROCEDURE — 93793 PR ANTICOAGULANT MGMT FOR PT TAKING WARFARIN: ICD-10-PCS | Mod: S$GLB,,,

## 2020-10-29 PROCEDURE — 93793 ANTICOAG MGMT PT WARFARIN: CPT | Mod: S$GLB,,,

## 2020-11-10 ENCOUNTER — LAB VISIT (OUTPATIENT)
Dept: LAB | Facility: HOSPITAL | Age: 80
End: 2020-11-10
Attending: INTERNAL MEDICINE
Payer: MEDICARE

## 2020-11-10 ENCOUNTER — ANTI-COAG VISIT (OUTPATIENT)
Dept: CARDIOLOGY | Facility: CLINIC | Age: 80
End: 2020-11-10
Payer: MEDICARE

## 2020-11-10 DIAGNOSIS — Z95.2 H/O MECHANICAL AORTIC VALVE REPLACEMENT: ICD-10-CM

## 2020-11-10 DIAGNOSIS — Z79.01 LONG TERM CURRENT USE OF ANTICOAGULANT THERAPY: ICD-10-CM

## 2020-11-10 DIAGNOSIS — I50.9 CHRONIC HEART FAILURE, UNSPECIFIED HEART FAILURE TYPE: ICD-10-CM

## 2020-11-10 LAB
ANION GAP SERPL CALC-SCNC: 6 MMOL/L (ref 8–16)
BUN SERPL-MCNC: 59 MG/DL (ref 8–23)
CALCIUM SERPL-MCNC: 10.7 MG/DL (ref 8.7–10.5)
CHLORIDE SERPL-SCNC: 105 MMOL/L (ref 95–110)
CO2 SERPL-SCNC: 28 MMOL/L (ref 23–29)
CREAT SERPL-MCNC: 1.8 MG/DL (ref 0.5–1.4)
EST. GFR  (AFRICAN AMERICAN): 40.5 ML/MIN/1.73 M^2
EST. GFR  (NON AFRICAN AMERICAN): 35 ML/MIN/1.73 M^2
GLUCOSE SERPL-MCNC: 91 MG/DL (ref 70–110)
INR PPP: 1.9 (ref 0.8–1.2)
POTASSIUM SERPL-SCNC: 5.3 MMOL/L (ref 3.5–5.1)
PROTHROMBIN TIME: 20 SEC (ref 9–12.5)
SODIUM SERPL-SCNC: 139 MMOL/L (ref 136–145)

## 2020-11-10 PROCEDURE — 93793 PR ANTICOAGULANT MGMT FOR PT TAKING WARFARIN: ICD-10-PCS | Mod: S$GLB,,,

## 2020-11-10 PROCEDURE — 93793 ANTICOAG MGMT PT WARFARIN: CPT | Mod: S$GLB,,,

## 2020-11-10 PROCEDURE — 36415 COLL VENOUS BLD VENIPUNCTURE: CPT | Mod: HCNC

## 2020-11-10 PROCEDURE — 85610 PROTHROMBIN TIME: CPT | Mod: HCNC

## 2020-11-10 PROCEDURE — 80048 BASIC METABOLIC PNL TOTAL CA: CPT | Mod: HCNC

## 2020-11-24 ENCOUNTER — LAB VISIT (OUTPATIENT)
Dept: LAB | Facility: HOSPITAL | Age: 80
End: 2020-11-24
Attending: INTERNAL MEDICINE
Payer: MEDICARE

## 2020-11-24 ENCOUNTER — ANTI-COAG VISIT (OUTPATIENT)
Dept: CARDIOLOGY | Facility: CLINIC | Age: 80
End: 2020-11-24
Payer: MEDICARE

## 2020-11-24 DIAGNOSIS — Z79.01 LONG TERM CURRENT USE OF ANTICOAGULANT THERAPY: ICD-10-CM

## 2020-11-24 DIAGNOSIS — Z95.2 H/O MECHANICAL AORTIC VALVE REPLACEMENT: ICD-10-CM

## 2020-11-24 DIAGNOSIS — I50.9 CHRONIC HEART FAILURE, UNSPECIFIED HEART FAILURE TYPE: ICD-10-CM

## 2020-11-24 LAB
INR PPP: 1.8 (ref 0.8–1.2)
PROTHROMBIN TIME: 19.3 SEC (ref 9–12.5)

## 2020-11-24 PROCEDURE — 85610 PROTHROMBIN TIME: CPT | Mod: HCNC

## 2020-11-24 PROCEDURE — 93793 PR ANTICOAGULANT MGMT FOR PT TAKING WARFARIN: ICD-10-PCS | Mod: S$GLB,,,

## 2020-11-24 PROCEDURE — 36415 COLL VENOUS BLD VENIPUNCTURE: CPT | Mod: HCNC

## 2020-11-24 PROCEDURE — 93793 ANTICOAG MGMT PT WARFARIN: CPT | Mod: S$GLB,,,

## 2020-11-24 NOTE — PROGRESS NOTES
INR not at goal. Medications, chart, and patient findings reviewed. See calendar for adjustments to dose and follow up plan.  Pt continues to deny any changes.  Recommend pt takes 10mg 11/24 & increase maintenance dose.  Plan to re-assess in 2 weeks.

## 2020-12-08 ENCOUNTER — LAB VISIT (OUTPATIENT)
Dept: LAB | Facility: HOSPITAL | Age: 80
End: 2020-12-08
Attending: INTERNAL MEDICINE
Payer: MEDICARE

## 2020-12-08 ENCOUNTER — ANTI-COAG VISIT (OUTPATIENT)
Dept: CARDIOLOGY | Facility: CLINIC | Age: 80
End: 2020-12-08
Payer: MEDICARE

## 2020-12-08 DIAGNOSIS — Z79.01 LONG TERM CURRENT USE OF ANTICOAGULANT THERAPY: ICD-10-CM

## 2020-12-08 DIAGNOSIS — Z95.2 H/O MECHANICAL AORTIC VALVE REPLACEMENT: ICD-10-CM

## 2020-12-08 LAB
INR PPP: 2.1 (ref 0.8–1.2)
PROTHROMBIN TIME: 22.3 SEC (ref 9–12.5)

## 2020-12-08 PROCEDURE — 93793 ANTICOAG MGMT PT WARFARIN: CPT | Mod: S$GLB,,,

## 2020-12-08 PROCEDURE — 85610 PROTHROMBIN TIME: CPT | Mod: HCNC

## 2020-12-08 PROCEDURE — 36415 COLL VENOUS BLD VENIPUNCTURE: CPT | Mod: HCNC

## 2020-12-08 PROCEDURE — 93793 PR ANTICOAGULANT MGMT FOR PT TAKING WARFARIN: ICD-10-PCS | Mod: S$GLB,,,

## 2020-12-11 ENCOUNTER — PES CALL (OUTPATIENT)
Dept: ADMINISTRATIVE | Facility: CLINIC | Age: 80
End: 2020-12-11

## 2020-12-29 ENCOUNTER — LAB VISIT (OUTPATIENT)
Dept: LAB | Facility: HOSPITAL | Age: 80
End: 2020-12-29
Attending: INTERNAL MEDICINE
Payer: MEDICARE

## 2020-12-29 ENCOUNTER — ANTI-COAG VISIT (OUTPATIENT)
Dept: CARDIOLOGY | Facility: CLINIC | Age: 80
End: 2020-12-29
Payer: MEDICARE

## 2020-12-29 DIAGNOSIS — Z95.2 H/O MECHANICAL AORTIC VALVE REPLACEMENT: ICD-10-CM

## 2020-12-29 DIAGNOSIS — Z79.01 LONG TERM CURRENT USE OF ANTICOAGULANT THERAPY: ICD-10-CM

## 2020-12-29 LAB
INR PPP: 2.9 (ref 0.8–1.2)
PROTHROMBIN TIME: 30.1 SEC (ref 9–12.5)

## 2020-12-29 PROCEDURE — 93793 ANTICOAG MGMT PT WARFARIN: CPT | Mod: S$GLB,,,

## 2020-12-29 PROCEDURE — 36415 COLL VENOUS BLD VENIPUNCTURE: CPT | Mod: HCNC

## 2020-12-29 PROCEDURE — 85610 PROTHROMBIN TIME: CPT | Mod: HCNC

## 2020-12-29 PROCEDURE — 93793 PR ANTICOAGULANT MGMT FOR PT TAKING WARFARIN: ICD-10-PCS | Mod: S$GLB,,,

## 2021-01-04 ENCOUNTER — TELEPHONE (OUTPATIENT)
Dept: INTERNAL MEDICINE | Facility: CLINIC | Age: 81
End: 2021-01-04

## 2021-01-09 ENCOUNTER — IMMUNIZATION (OUTPATIENT)
Dept: INTERNAL MEDICINE | Facility: CLINIC | Age: 81
End: 2021-01-09
Payer: MEDICARE

## 2021-01-09 DIAGNOSIS — Z23 NEED FOR VACCINATION: ICD-10-CM

## 2021-01-09 PROCEDURE — 91300 COVID-19, MRNA, LNP-S, PF, 30 MCG/0.3 ML DOSE VACCINE: CPT | Mod: PBBFAC | Performed by: INTERNAL MEDICINE

## 2021-01-19 ENCOUNTER — LAB VISIT (OUTPATIENT)
Dept: LAB | Facility: HOSPITAL | Age: 81
End: 2021-01-19
Attending: INTERNAL MEDICINE
Payer: MEDICARE

## 2021-01-19 ENCOUNTER — OFFICE VISIT (OUTPATIENT)
Dept: INTERNAL MEDICINE | Facility: CLINIC | Age: 81
End: 2021-01-19
Payer: MEDICARE

## 2021-01-19 ENCOUNTER — ANTI-COAG VISIT (OUTPATIENT)
Dept: CARDIOLOGY | Facility: CLINIC | Age: 81
End: 2021-01-19
Payer: MEDICARE

## 2021-01-19 VITALS
DIASTOLIC BLOOD PRESSURE: 52 MMHG | BODY MASS INDEX: 31.15 KG/M2 | WEIGHT: 193.81 LBS | OXYGEN SATURATION: 97 % | HEART RATE: 67 BPM | HEIGHT: 66 IN | SYSTOLIC BLOOD PRESSURE: 132 MMHG

## 2021-01-19 DIAGNOSIS — I25.708 CORONARY ARTERY DISEASE OF BYPASS GRAFT OF NATIVE HEART WITH STABLE ANGINA PECTORIS: ICD-10-CM

## 2021-01-19 DIAGNOSIS — Z00.00 ENCOUNTER FOR PREVENTIVE HEALTH EXAMINATION: Primary | ICD-10-CM

## 2021-01-19 DIAGNOSIS — I27.9 PULMONARY HEART DISEASE: ICD-10-CM

## 2021-01-19 DIAGNOSIS — I73.9 PVD (PERIPHERAL VASCULAR DISEASE): ICD-10-CM

## 2021-01-19 DIAGNOSIS — E11.69 OBESITY, DIABETES, AND HYPERTENSION SYNDROME: ICD-10-CM

## 2021-01-19 DIAGNOSIS — N18.30 TYPE 2 DIABETES MELLITUS WITH STAGE 3 CHRONIC KIDNEY DISEASE, WITHOUT LONG-TERM CURRENT USE OF INSULIN, UNSPECIFIED WHETHER STAGE 3A OR 3B CKD: ICD-10-CM

## 2021-01-19 DIAGNOSIS — I15.2 OBESITY, DIABETES, AND HYPERTENSION SYNDROME: ICD-10-CM

## 2021-01-19 DIAGNOSIS — E11.59 OBESITY, DIABETES, AND HYPERTENSION SYNDROME: ICD-10-CM

## 2021-01-19 DIAGNOSIS — N25.81 HYPERPARATHYROIDISM DUE TO RENAL INSUFFICIENCY: ICD-10-CM

## 2021-01-19 DIAGNOSIS — I71.40 ABDOMINAL AORTIC ANEURYSM (AAA) WITHOUT RUPTURE: ICD-10-CM

## 2021-01-19 DIAGNOSIS — R26.9 ABNORMALITY OF GAIT AND MOBILITY: ICD-10-CM

## 2021-01-19 DIAGNOSIS — I10 ESSENTIAL HYPERTENSION: ICD-10-CM

## 2021-01-19 DIAGNOSIS — Z11.59 NEED FOR HEPATITIS C SCREENING TEST: ICD-10-CM

## 2021-01-19 DIAGNOSIS — I15.0 RENOVASCULAR HYPERTENSION: ICD-10-CM

## 2021-01-19 DIAGNOSIS — Z79.01 LONG TERM CURRENT USE OF ANTICOAGULANT THERAPY: ICD-10-CM

## 2021-01-19 DIAGNOSIS — E78.2 MIXED HYPERLIPIDEMIA: ICD-10-CM

## 2021-01-19 DIAGNOSIS — N18.30 STAGE 3 CHRONIC KIDNEY DISEASE, UNSPECIFIED WHETHER STAGE 3A OR 3B CKD: ICD-10-CM

## 2021-01-19 DIAGNOSIS — I65.23 BILATERAL CAROTID ARTERY STENOSIS: ICD-10-CM

## 2021-01-19 DIAGNOSIS — D63.1 ANEMIA OF CHRONIC RENAL FAILURE, STAGE 3 (MODERATE), UNSPECIFIED WHETHER STAGE 3A OR 3B CKD: ICD-10-CM

## 2021-01-19 DIAGNOSIS — E66.9 OBESITY, DIABETES, AND HYPERTENSION SYNDROME: ICD-10-CM

## 2021-01-19 DIAGNOSIS — N18.30 ANEMIA OF CHRONIC RENAL FAILURE, STAGE 3 (MODERATE), UNSPECIFIED WHETHER STAGE 3A OR 3B CKD: ICD-10-CM

## 2021-01-19 DIAGNOSIS — G56.03 BILATERAL CARPAL TUNNEL SYNDROME: ICD-10-CM

## 2021-01-19 DIAGNOSIS — E11.22 TYPE 2 DIABETES MELLITUS WITH STAGE 3 CHRONIC KIDNEY DISEASE, WITHOUT LONG-TERM CURRENT USE OF INSULIN, UNSPECIFIED WHETHER STAGE 3A OR 3B CKD: ICD-10-CM

## 2021-01-19 DIAGNOSIS — Z95.2 H/O MECHANICAL AORTIC VALVE REPLACEMENT: ICD-10-CM

## 2021-01-19 LAB
INR PPP: 2.8 (ref 0.8–1.2)
PROTHROMBIN TIME: 28.7 SEC (ref 9–12.5)

## 2021-01-19 PROCEDURE — 3078F DIAST BP <80 MM HG: CPT | Mod: CPTII,S$GLB,, | Performed by: PHYSICIAN ASSISTANT

## 2021-01-19 PROCEDURE — 1101F PT FALLS ASSESS-DOCD LE1/YR: CPT | Mod: CPTII,S$GLB,, | Performed by: PHYSICIAN ASSISTANT

## 2021-01-19 PROCEDURE — 36415 COLL VENOUS BLD VENIPUNCTURE: CPT

## 2021-01-19 PROCEDURE — 93793 ANTICOAG MGMT PT WARFARIN: CPT | Mod: S$GLB,,,

## 2021-01-19 PROCEDURE — 99499 RISK ADDL DX/OHS AUDIT: ICD-10-PCS | Mod: S$GLB,,, | Performed by: PHYSICIAN ASSISTANT

## 2021-01-19 PROCEDURE — 99999 PR PBB SHADOW E&M-EST. PATIENT-LVL V: CPT | Mod: PBBFAC,,, | Performed by: PHYSICIAN ASSISTANT

## 2021-01-19 PROCEDURE — 1126F PR PAIN SEVERITY QUANTIFIED, NO PAIN PRESENT: ICD-10-PCS | Mod: S$GLB,,, | Performed by: PHYSICIAN ASSISTANT

## 2021-01-19 PROCEDURE — 99499 UNLISTED E&M SERVICE: CPT | Mod: S$GLB,,, | Performed by: PHYSICIAN ASSISTANT

## 2021-01-19 PROCEDURE — 93793 PR ANTICOAGULANT MGMT FOR PT TAKING WARFARIN: ICD-10-PCS | Mod: S$GLB,,,

## 2021-01-19 PROCEDURE — 85610 PROTHROMBIN TIME: CPT

## 2021-01-19 PROCEDURE — 3078F PR MOST RECENT DIASTOLIC BLOOD PRESSURE < 80 MM HG: ICD-10-PCS | Mod: CPTII,S$GLB,, | Performed by: PHYSICIAN ASSISTANT

## 2021-01-19 PROCEDURE — 99999 PR PBB SHADOW E&M-EST. PATIENT-LVL V: ICD-10-PCS | Mod: PBBFAC,,, | Performed by: PHYSICIAN ASSISTANT

## 2021-01-19 PROCEDURE — 1126F AMNT PAIN NOTED NONE PRSNT: CPT | Mod: S$GLB,,, | Performed by: PHYSICIAN ASSISTANT

## 2021-01-19 PROCEDURE — 3075F SYST BP GE 130 - 139MM HG: CPT | Mod: CPTII,S$GLB,, | Performed by: PHYSICIAN ASSISTANT

## 2021-01-19 PROCEDURE — 3288F PR FALLS RISK ASSESSMENT DOCUMENTED: ICD-10-PCS | Mod: CPTII,S$GLB,, | Performed by: PHYSICIAN ASSISTANT

## 2021-01-19 PROCEDURE — 3288F FALL RISK ASSESSMENT DOCD: CPT | Mod: CPTII,S$GLB,, | Performed by: PHYSICIAN ASSISTANT

## 2021-01-19 PROCEDURE — 3075F PR MOST RECENT SYSTOLIC BLOOD PRESS GE 130-139MM HG: ICD-10-PCS | Mod: CPTII,S$GLB,, | Performed by: PHYSICIAN ASSISTANT

## 2021-01-19 PROCEDURE — 1101F PR PT FALLS ASSESS DOC 0-1 FALLS W/OUT INJ PAST YR: ICD-10-PCS | Mod: CPTII,S$GLB,, | Performed by: PHYSICIAN ASSISTANT

## 2021-01-19 PROCEDURE — G0439 PPPS, SUBSEQ VISIT: HCPCS | Mod: S$GLB,,, | Performed by: PHYSICIAN ASSISTANT

## 2021-01-19 PROCEDURE — G0439 PR MEDICARE ANNUAL WELLNESS SUBSEQUENT VISIT: ICD-10-PCS | Mod: S$GLB,,, | Performed by: PHYSICIAN ASSISTANT

## 2021-01-26 ENCOUNTER — PATIENT OUTREACH (OUTPATIENT)
Dept: ADMINISTRATIVE | Facility: HOSPITAL | Age: 81
End: 2021-01-26

## 2021-01-27 DIAGNOSIS — I15.0 RENOVASCULAR HYPERTENSION: ICD-10-CM

## 2021-01-28 RX ORDER — CARVEDILOL 12.5 MG/1
TABLET ORAL
Qty: 180 TABLET | Refills: 0 | Status: SHIPPED | OUTPATIENT
Start: 2021-01-28 | End: 2021-04-12 | Stop reason: SDUPTHER

## 2021-01-29 NOTE — LETTER
May 8, 2020      January Taylor MD  [address]  [facility] [address]           [place] [address]  [address], [address]  [facility] [address]  Phone: [phone]          Patient: Dariel Urbina   MR Number: [id number]   YOB: 1940   Date of Visit: 5/8/2020       Dear Dr. January Taylor:    Thank you for referring Dariel Urbina to me for evaluation. Attached you will find relevant portions of my assessment and plan of care.    If you have questions, please do not hesitate to call me. I look forward to following Dariel Urbina along with you.    Sincerely,    Rupesh Norris Jr., MD    Enclosure  CC:  No Recipients    If you would like to receive this communication electronically, please contact [email or web address] or [phone] to request more information on [employer] access.    For providers and/or their staff who would like to refer a patient to [facility], please contact us through our one-stop-shop provider referral line, [facility], at [phone].    If you feel you have received this communication in error or would no longer like to receive these types of communications, please e-mail [email or web address]          Please schedule patient an appointment per MD. Thanks

## 2021-01-30 ENCOUNTER — IMMUNIZATION (OUTPATIENT)
Dept: INTERNAL MEDICINE | Facility: CLINIC | Age: 81
End: 2021-01-30
Payer: MEDICARE

## 2021-01-30 DIAGNOSIS — Z23 NEED FOR VACCINATION: Primary | ICD-10-CM

## 2021-01-30 PROCEDURE — 0002A PR IMMUNIZ ADMIN, SARS-COV-2 COVID-19 VACC, 30MCG/0.3ML, 2ND DOSE: CPT | Mod: PBBFAC | Performed by: INTERNAL MEDICINE

## 2021-01-30 PROCEDURE — 91300 PR SARS-COV- 2 COVID-19 VACCINE, NO PRSV, 30MCG/0.3ML, IM: CPT | Mod: PBBFAC | Performed by: INTERNAL MEDICINE

## 2021-01-30 RX ADMIN — RNA INGREDIENT BNT-162B2 0.3 ML: 0.23 INJECTION, SUSPENSION INTRAMUSCULAR at 12:01

## 2021-02-09 ENCOUNTER — ANTI-COAG VISIT (OUTPATIENT)
Dept: CARDIOLOGY | Facility: CLINIC | Age: 81
End: 2021-02-09
Payer: MEDICARE

## 2021-02-09 ENCOUNTER — LAB VISIT (OUTPATIENT)
Dept: LAB | Facility: HOSPITAL | Age: 81
End: 2021-02-09
Attending: INTERNAL MEDICINE
Payer: MEDICARE

## 2021-02-09 ENCOUNTER — OFFICE VISIT (OUTPATIENT)
Dept: INTERNAL MEDICINE | Facility: CLINIC | Age: 81
End: 2021-02-09
Payer: MEDICARE

## 2021-02-09 VITALS
HEART RATE: 67 BPM | WEIGHT: 196.44 LBS | DIASTOLIC BLOOD PRESSURE: 60 MMHG | BODY MASS INDEX: 31.57 KG/M2 | HEIGHT: 66 IN | SYSTOLIC BLOOD PRESSURE: 114 MMHG | OXYGEN SATURATION: 94 %

## 2021-02-09 DIAGNOSIS — Z79.01 LONG TERM CURRENT USE OF ANTICOAGULANT THERAPY: Primary | ICD-10-CM

## 2021-02-09 DIAGNOSIS — I10 ESSENTIAL HYPERTENSION: ICD-10-CM

## 2021-02-09 DIAGNOSIS — Z79.01 LONG TERM CURRENT USE OF ANTICOAGULANT THERAPY: ICD-10-CM

## 2021-02-09 DIAGNOSIS — Z95.2 H/O MECHANICAL AORTIC VALVE REPLACEMENT: ICD-10-CM

## 2021-02-09 DIAGNOSIS — N18.32 STAGE 3B CHRONIC KIDNEY DISEASE: ICD-10-CM

## 2021-02-09 DIAGNOSIS — E87.5 HYPERKALEMIA: ICD-10-CM

## 2021-02-09 DIAGNOSIS — E11.22 TYPE 2 DIABETES MELLITUS WITH STAGE 3 CHRONIC KIDNEY DISEASE, WITHOUT LONG-TERM CURRENT USE OF INSULIN, UNSPECIFIED WHETHER STAGE 3A OR 3B CKD: Primary | ICD-10-CM

## 2021-02-09 DIAGNOSIS — E78.2 MIXED HYPERLIPIDEMIA: ICD-10-CM

## 2021-02-09 DIAGNOSIS — N18.30 TYPE 2 DIABETES MELLITUS WITH STAGE 3 CHRONIC KIDNEY DISEASE, WITHOUT LONG-TERM CURRENT USE OF INSULIN, UNSPECIFIED WHETHER STAGE 3A OR 3B CKD: Primary | ICD-10-CM

## 2021-02-09 LAB
INR PPP: 2.2 (ref 0.8–1.2)
PROTHROMBIN TIME: 22.9 SEC (ref 9–12.5)

## 2021-02-09 PROCEDURE — 1159F PR MEDICATION LIST DOCUMENTED IN MEDICAL RECORD: ICD-10-PCS | Mod: S$GLB,,, | Performed by: INTERNAL MEDICINE

## 2021-02-09 PROCEDURE — 99999 PR PBB SHADOW E&M-EST. PATIENT-LVL IV: ICD-10-PCS | Mod: PBBFAC,,, | Performed by: INTERNAL MEDICINE

## 2021-02-09 PROCEDURE — 93793 PR ANTICOAGULANT MGMT FOR PT TAKING WARFARIN: ICD-10-PCS | Mod: S$GLB,,,

## 2021-02-09 PROCEDURE — 99214 PR OFFICE/OUTPT VISIT, EST, LEVL IV, 30-39 MIN: ICD-10-PCS | Mod: S$GLB,,, | Performed by: INTERNAL MEDICINE

## 2021-02-09 PROCEDURE — 99499 RISK ADDL DX/OHS AUDIT: ICD-10-PCS | Mod: S$GLB,,, | Performed by: INTERNAL MEDICINE

## 2021-02-09 PROCEDURE — 99499 UNLISTED E&M SERVICE: CPT | Mod: S$GLB,,, | Performed by: INTERNAL MEDICINE

## 2021-02-09 PROCEDURE — 36415 COLL VENOUS BLD VENIPUNCTURE: CPT

## 2021-02-09 PROCEDURE — 93793 ANTICOAG MGMT PT WARFARIN: CPT | Mod: S$GLB,,,

## 2021-02-09 PROCEDURE — 3074F PR MOST RECENT SYSTOLIC BLOOD PRESSURE < 130 MM HG: ICD-10-PCS | Mod: CPTII,S$GLB,, | Performed by: INTERNAL MEDICINE

## 2021-02-09 PROCEDURE — 3074F SYST BP LT 130 MM HG: CPT | Mod: CPTII,S$GLB,, | Performed by: INTERNAL MEDICINE

## 2021-02-09 PROCEDURE — 3078F PR MOST RECENT DIASTOLIC BLOOD PRESSURE < 80 MM HG: ICD-10-PCS | Mod: CPTII,S$GLB,, | Performed by: INTERNAL MEDICINE

## 2021-02-09 PROCEDURE — 1159F MED LIST DOCD IN RCRD: CPT | Mod: S$GLB,,, | Performed by: INTERNAL MEDICINE

## 2021-02-09 PROCEDURE — 85610 PROTHROMBIN TIME: CPT

## 2021-02-09 PROCEDURE — 99999 PR PBB SHADOW E&M-EST. PATIENT-LVL IV: CPT | Mod: PBBFAC,,, | Performed by: INTERNAL MEDICINE

## 2021-02-09 PROCEDURE — 3078F DIAST BP <80 MM HG: CPT | Mod: CPTII,S$GLB,, | Performed by: INTERNAL MEDICINE

## 2021-02-09 PROCEDURE — 99214 OFFICE O/P EST MOD 30 MIN: CPT | Mod: S$GLB,,, | Performed by: INTERNAL MEDICINE

## 2021-03-03 ENCOUNTER — PATIENT OUTREACH (OUTPATIENT)
Dept: ADMINISTRATIVE | Facility: OTHER | Age: 81
End: 2021-03-03

## 2021-03-04 ENCOUNTER — OFFICE VISIT (OUTPATIENT)
Dept: OPTOMETRY | Facility: CLINIC | Age: 81
End: 2021-03-04
Payer: MEDICARE

## 2021-03-04 DIAGNOSIS — H52.203 MYOPIA WITH ASTIGMATISM AND PRESBYOPIA, BILATERAL: ICD-10-CM

## 2021-03-04 DIAGNOSIS — N18.30 TYPE 2 DIABETES MELLITUS WITH STAGE 3 CHRONIC KIDNEY DISEASE, WITHOUT LONG-TERM CURRENT USE OF INSULIN, UNSPECIFIED WHETHER STAGE 3A OR 3B CKD: ICD-10-CM

## 2021-03-04 DIAGNOSIS — E11.22 TYPE 2 DIABETES MELLITUS WITH STAGE 3 CHRONIC KIDNEY DISEASE, WITHOUT LONG-TERM CURRENT USE OF INSULIN, UNSPECIFIED WHETHER STAGE 3A OR 3B CKD: ICD-10-CM

## 2021-03-04 DIAGNOSIS — H25.813 COMBINED FORM OF AGE-RELATED CATARACT, BOTH EYES: ICD-10-CM

## 2021-03-04 DIAGNOSIS — H40.012 OAG (OPEN ANGLE GLAUCOMA) SUSPECT, LOW RISK, LEFT: ICD-10-CM

## 2021-03-04 DIAGNOSIS — E11.9 TYPE 2 DIABETES MELLITUS WITHOUT RETINOPATHY: Primary | ICD-10-CM

## 2021-03-04 DIAGNOSIS — H52.13 MYOPIA WITH ASTIGMATISM AND PRESBYOPIA, BILATERAL: ICD-10-CM

## 2021-03-04 DIAGNOSIS — H52.4 MYOPIA WITH ASTIGMATISM AND PRESBYOPIA, BILATERAL: ICD-10-CM

## 2021-03-04 DIAGNOSIS — H04.123 DRY EYE SYNDROME OF BOTH EYES: ICD-10-CM

## 2021-03-04 DIAGNOSIS — H35.372 EPIRETINAL MEMBRANE (ERM) OF LEFT EYE: ICD-10-CM

## 2021-03-04 PROCEDURE — 2023F PR DILATED RETINAL EXAM W/O EVID OF RETINOPATHY: ICD-10-PCS | Mod: S$GLB,,, | Performed by: OPTOMETRIST

## 2021-03-04 PROCEDURE — 92014 PR EYE EXAM, EST PATIENT,COMPREHESV: ICD-10-PCS | Mod: S$GLB,,, | Performed by: OPTOMETRIST

## 2021-03-04 PROCEDURE — 92015 PR REFRACTION: ICD-10-PCS | Mod: S$GLB,,, | Performed by: OPTOMETRIST

## 2021-03-04 PROCEDURE — 1126F AMNT PAIN NOTED NONE PRSNT: CPT | Mod: S$GLB,,, | Performed by: OPTOMETRIST

## 2021-03-04 PROCEDURE — 99999 PR PBB SHADOW E&M-EST. PATIENT-LVL III: CPT | Mod: PBBFAC,,, | Performed by: OPTOMETRIST

## 2021-03-04 PROCEDURE — 99999 PR PBB SHADOW E&M-EST. PATIENT-LVL III: ICD-10-PCS | Mod: PBBFAC,,, | Performed by: OPTOMETRIST

## 2021-03-04 PROCEDURE — 2023F DILAT RTA XM W/O RTNOPTHY: CPT | Mod: S$GLB,,, | Performed by: OPTOMETRIST

## 2021-03-04 PROCEDURE — 92014 COMPRE OPH EXAM EST PT 1/>: CPT | Mod: S$GLB,,, | Performed by: OPTOMETRIST

## 2021-03-04 PROCEDURE — 92015 DETERMINE REFRACTIVE STATE: CPT | Mod: S$GLB,,, | Performed by: OPTOMETRIST

## 2021-03-04 PROCEDURE — 1126F PR PAIN SEVERITY QUANTIFIED, NO PAIN PRESENT: ICD-10-PCS | Mod: S$GLB,,, | Performed by: OPTOMETRIST

## 2021-03-09 ENCOUNTER — ANTI-COAG VISIT (OUTPATIENT)
Dept: CARDIOLOGY | Facility: CLINIC | Age: 81
End: 2021-03-09
Payer: MEDICARE

## 2021-03-09 ENCOUNTER — LAB VISIT (OUTPATIENT)
Dept: LAB | Facility: HOSPITAL | Age: 81
End: 2021-03-09
Attending: INTERNAL MEDICINE
Payer: MEDICARE

## 2021-03-09 DIAGNOSIS — Z79.01 LONG TERM CURRENT USE OF ANTICOAGULANT THERAPY: ICD-10-CM

## 2021-03-09 DIAGNOSIS — Z95.2 H/O MECHANICAL AORTIC VALVE REPLACEMENT: ICD-10-CM

## 2021-03-09 DIAGNOSIS — Z79.01 LONG TERM CURRENT USE OF ANTICOAGULANT THERAPY: Primary | ICD-10-CM

## 2021-03-09 LAB
INR PPP: 2.4 (ref 0.8–1.2)
PROTHROMBIN TIME: 25.3 SEC (ref 9–12.5)

## 2021-03-09 PROCEDURE — 93793 ANTICOAG MGMT PT WARFARIN: CPT | Mod: S$GLB,,,

## 2021-03-09 PROCEDURE — 36415 COLL VENOUS BLD VENIPUNCTURE: CPT | Performed by: INTERNAL MEDICINE

## 2021-03-09 PROCEDURE — 93793 PR ANTICOAGULANT MGMT FOR PT TAKING WARFARIN: ICD-10-PCS | Mod: S$GLB,,,

## 2021-03-09 PROCEDURE — 85610 PROTHROMBIN TIME: CPT | Performed by: INTERNAL MEDICINE

## 2021-03-23 ENCOUNTER — OFFICE VISIT (OUTPATIENT)
Dept: CARDIOLOGY | Facility: CLINIC | Age: 81
End: 2021-03-23
Payer: MEDICARE

## 2021-03-23 ENCOUNTER — HOSPITAL ENCOUNTER (OUTPATIENT)
Dept: CARDIOLOGY | Facility: HOSPITAL | Age: 81
Discharge: HOME | End: 2021-03-23
Attending: PHYSICIAN ASSISTANT
Payer: MEDICARE

## 2021-03-23 ENCOUNTER — CLINICAL SUPPORT (OUTPATIENT)
Dept: OPHTHALMOLOGY | Facility: CLINIC | Age: 81
End: 2021-03-23
Payer: MEDICARE

## 2021-03-23 VITALS
HEART RATE: 57 BPM | WEIGHT: 196.44 LBS | HEIGHT: 66 IN | BODY MASS INDEX: 31.57 KG/M2 | SYSTOLIC BLOOD PRESSURE: 128 MMHG | DIASTOLIC BLOOD PRESSURE: 58 MMHG | OXYGEN SATURATION: 98 %

## 2021-03-23 DIAGNOSIS — N18.30 TYPE 2 DIABETES MELLITUS WITH STAGE 3 CHRONIC KIDNEY DISEASE, WITHOUT LONG-TERM CURRENT USE OF INSULIN, UNSPECIFIED WHETHER STAGE 3A OR 3B CKD: ICD-10-CM

## 2021-03-23 DIAGNOSIS — I71.40 ABDOMINAL AORTIC ANEURYSM (AAA) WITHOUT RUPTURE: ICD-10-CM

## 2021-03-23 DIAGNOSIS — E66.9 OBESITY (BMI 30.0-34.9): ICD-10-CM

## 2021-03-23 DIAGNOSIS — I10 ESSENTIAL HYPERTENSION: ICD-10-CM

## 2021-03-23 DIAGNOSIS — I25.708 CORONARY ARTERY DISEASE OF BYPASS GRAFT OF NATIVE HEART WITH STABLE ANGINA PECTORIS: ICD-10-CM

## 2021-03-23 DIAGNOSIS — D63.1 ANEMIA OF CHRONIC RENAL FAILURE, STAGE 3 (MODERATE), UNSPECIFIED WHETHER STAGE 3A OR 3B CKD: ICD-10-CM

## 2021-03-23 DIAGNOSIS — Z79.01 LONG TERM CURRENT USE OF ANTICOAGULANT THERAPY: ICD-10-CM

## 2021-03-23 DIAGNOSIS — I65.23 BILATERAL CAROTID ARTERY STENOSIS: Primary | ICD-10-CM

## 2021-03-23 DIAGNOSIS — E11.22 TYPE 2 DIABETES MELLITUS WITH STAGE 3 CHRONIC KIDNEY DISEASE, WITHOUT LONG-TERM CURRENT USE OF INSULIN, UNSPECIFIED WHETHER STAGE 3A OR 3B CKD: ICD-10-CM

## 2021-03-23 DIAGNOSIS — N18.30 ANEMIA OF CHRONIC RENAL FAILURE, STAGE 3 (MODERATE), UNSPECIFIED WHETHER STAGE 3A OR 3B CKD: ICD-10-CM

## 2021-03-23 DIAGNOSIS — E78.2 MIXED HYPERLIPIDEMIA: ICD-10-CM

## 2021-03-23 DIAGNOSIS — H40.012 OAG (OPEN ANGLE GLAUCOMA) SUSPECT, LOW RISK, LEFT: ICD-10-CM

## 2021-03-23 DIAGNOSIS — I73.9 PVD (PERIPHERAL VASCULAR DISEASE): ICD-10-CM

## 2021-03-23 DIAGNOSIS — Z95.2 H/O MECHANICAL AORTIC VALVE REPLACEMENT: ICD-10-CM

## 2021-03-23 DIAGNOSIS — N18.32 STAGE 3B CHRONIC KIDNEY DISEASE: ICD-10-CM

## 2021-03-23 DIAGNOSIS — I65.23 BILATERAL CAROTID ARTERY STENOSIS: ICD-10-CM

## 2021-03-23 LAB
LEFT ARM DIASTOLIC BLOOD PRESSURE: 58 MMHG
LEFT ARM SYSTOLIC BLOOD PRESSURE: 132 MMHG
LEFT CBA DIAS: 22 CM/S
LEFT CBA SYS: 130 CM/S
LEFT CCA DIST DIAS: 22 CM/S
LEFT CCA DIST SYS: 145 CM/S
LEFT CCA MID DIAS: 24 CM/S
LEFT CCA MID SYS: 127 CM/S
LEFT CCA PROX DIAS: 10 CM/S
LEFT CCA PROX SYS: 79 CM/S
LEFT ECA DIAS: 11 CM/S
LEFT ECA SYS: 196 CM/S
LEFT ICA DIST DIAS: 36 CM/S
LEFT ICA DIST SYS: 155 CM/S
LEFT ICA MID DIAS: 34 CM/S
LEFT ICA MID SYS: 194 CM/S
LEFT ICA PROX DIAS: 36 CM/S
LEFT ICA PROX SYS: 205 CM/S
LEFT VERTEBRAL DIAS: 15 CM/S
LEFT VERTEBRAL SYS: 77 CM/S
OHS CV CAROTID RIGHT ICA EDV HIGHEST: 32
OHS CV CAROTID ULTRASOUND LEFT ICA/CCA RATIO: 1.41
OHS CV CAROTID ULTRASOUND RIGHT ICA/CCA RATIO: 2.95
OHS CV PV CAROTID LEFT HIGHEST CCA: 145
OHS CV PV CAROTID LEFT HIGHEST ICA: 205
OHS CV PV CAROTID RIGHT HIGHEST CCA: 83
OHS CV PV CAROTID RIGHT HIGHEST ICA: 189
OHS CV US CAROTID LEFT HIGHEST EDV: 36
RIGHT ARM DIASTOLIC BLOOD PRESSURE: 63 MMHG
RIGHT ARM SYSTOLIC BLOOD PRESSURE: 130 MMHG
RIGHT CBA DIAS: 22 CM/S
RIGHT CBA SYS: 68 CM/S
RIGHT CCA DIST DIAS: 10 CM/S
RIGHT CCA DIST SYS: 64 CM/S
RIGHT CCA MID DIAS: 12 CM/S
RIGHT CCA MID SYS: 60 CM/S
RIGHT CCA PROX DIAS: 14 CM/S
RIGHT CCA PROX SYS: 83 CM/S
RIGHT ECA DIAS: 0 CM/S
RIGHT ECA SYS: 405 CM/S
RIGHT ICA DIST DIAS: 24 CM/S
RIGHT ICA DIST SYS: 123 CM/S
RIGHT ICA MID DIAS: 25 CM/S
RIGHT ICA MID SYS: 155 CM/S
RIGHT ICA PROX DIAS: 32 CM/S
RIGHT ICA PROX SYS: 189 CM/S
RIGHT VERTEBRAL DIAS: 15 CM/S
RIGHT VERTEBRAL SYS: 108 CM/S

## 2021-03-23 PROCEDURE — 99999 PR PBB SHADOW E&M-EST. PATIENT-LVL IV: ICD-10-PCS | Mod: PBBFAC,,, | Performed by: PHYSICIAN ASSISTANT

## 2021-03-23 PROCEDURE — 3078F DIAST BP <80 MM HG: CPT | Mod: CPTII,S$GLB,, | Performed by: PHYSICIAN ASSISTANT

## 2021-03-23 PROCEDURE — 99214 PR OFFICE/OUTPT VISIT, EST, LEVL IV, 30-39 MIN: ICD-10-PCS | Mod: S$GLB,,, | Performed by: PHYSICIAN ASSISTANT

## 2021-03-23 PROCEDURE — 1159F PR MEDICATION LIST DOCUMENTED IN MEDICAL RECORD: ICD-10-PCS | Mod: S$GLB,,, | Performed by: PHYSICIAN ASSISTANT

## 2021-03-23 PROCEDURE — 93880 EXTRACRANIAL BILAT STUDY: CPT

## 2021-03-23 PROCEDURE — 3078F PR MOST RECENT DIASTOLIC BLOOD PRESSURE < 80 MM HG: ICD-10-PCS | Mod: CPTII,S$GLB,, | Performed by: PHYSICIAN ASSISTANT

## 2021-03-23 PROCEDURE — 1126F PR PAIN SEVERITY QUANTIFIED, NO PAIN PRESENT: ICD-10-PCS | Mod: S$GLB,,, | Performed by: PHYSICIAN ASSISTANT

## 2021-03-23 PROCEDURE — 99214 OFFICE O/P EST MOD 30 MIN: CPT | Mod: S$GLB,,, | Performed by: PHYSICIAN ASSISTANT

## 2021-03-23 PROCEDURE — 93880 EXTRACRANIAL BILAT STUDY: CPT | Mod: 26,,, | Performed by: INTERNAL MEDICINE

## 2021-03-23 PROCEDURE — 3074F SYST BP LT 130 MM HG: CPT | Mod: CPTII,S$GLB,, | Performed by: PHYSICIAN ASSISTANT

## 2021-03-23 PROCEDURE — 1159F MED LIST DOCD IN RCRD: CPT | Mod: S$GLB,,, | Performed by: PHYSICIAN ASSISTANT

## 2021-03-23 PROCEDURE — 1126F AMNT PAIN NOTED NONE PRSNT: CPT | Mod: S$GLB,,, | Performed by: PHYSICIAN ASSISTANT

## 2021-03-23 PROCEDURE — 93880 CV US DOPPLER CAROTID (CUPID ONLY): ICD-10-PCS | Mod: 26,,, | Performed by: INTERNAL MEDICINE

## 2021-03-23 PROCEDURE — 99999 PR PBB SHADOW E&M-EST. PATIENT-LVL IV: CPT | Mod: PBBFAC,,, | Performed by: PHYSICIAN ASSISTANT

## 2021-03-23 PROCEDURE — 3074F PR MOST RECENT SYSTOLIC BLOOD PRESSURE < 130 MM HG: ICD-10-PCS | Mod: CPTII,S$GLB,, | Performed by: PHYSICIAN ASSISTANT

## 2021-03-26 ENCOUNTER — HOSPITAL ENCOUNTER (OUTPATIENT)
Dept: CARDIOLOGY | Facility: HOSPITAL | Age: 81
Discharge: HOME | End: 2021-03-26
Attending: PHYSICIAN ASSISTANT
Payer: MEDICARE

## 2021-03-26 ENCOUNTER — TELEPHONE (OUTPATIENT)
Dept: OPTOMETRY | Facility: CLINIC | Age: 81
End: 2021-03-26

## 2021-03-26 DIAGNOSIS — I73.9 PVD (PERIPHERAL VASCULAR DISEASE): Primary | ICD-10-CM

## 2021-03-26 DIAGNOSIS — I73.9 PVD (PERIPHERAL VASCULAR DISEASE): ICD-10-CM

## 2021-03-26 LAB
IMMEDIATE ARM BP: 159 MMHG
IMMEDIATE LEFT ABI: 0.77
IMMEDIATE LEFT TIBIAL: 123 MMHG
IMMEDIATE RIGHT ABI: 0.39
IMMEDIATE RIGHT TIBIAL: 62 MMHG
LEFT ABI: 1.06
LEFT ARM BP: 154 MMHG
LEFT DORSALIS PEDIS: 166 MMHG
LEFT POSTERIOR TIBIAL: 152 MMHG
RIGHT ABI: 0.89
RIGHT ARM BP: 157 MMHG
RIGHT DORSALIS PEDIS: 140 MMHG
RIGHT POSTERIOR TIBIAL: 105 MMHG
TREADMILL GRADE: 12 %
TREADMILL SPEED: 2 MPH
TREADMILL TIME: 1.08 MIN

## 2021-03-26 PROCEDURE — 93924 LWR XTR VASC STDY BILAT: CPT

## 2021-03-26 PROCEDURE — 93924 LWR XTR VASC STDY BILAT: CPT | Mod: 26,,, | Performed by: INTERNAL MEDICINE

## 2021-03-26 PROCEDURE — 93924 ANKLE BRACHIAL INDICES (ABI): ICD-10-PCS | Mod: 26,,, | Performed by: INTERNAL MEDICINE

## 2021-03-30 ENCOUNTER — ANTI-COAG VISIT (OUTPATIENT)
Dept: CARDIOLOGY | Facility: CLINIC | Age: 81
End: 2021-03-30
Payer: MEDICARE

## 2021-03-30 ENCOUNTER — LAB VISIT (OUTPATIENT)
Dept: LAB | Facility: HOSPITAL | Age: 81
End: 2021-03-30
Attending: INTERNAL MEDICINE
Payer: MEDICARE

## 2021-03-30 DIAGNOSIS — Z95.2 H/O MECHANICAL AORTIC VALVE REPLACEMENT: ICD-10-CM

## 2021-03-30 DIAGNOSIS — Z79.01 LONG TERM CURRENT USE OF ANTICOAGULANT THERAPY: Primary | ICD-10-CM

## 2021-03-30 DIAGNOSIS — Z79.01 LONG TERM CURRENT USE OF ANTICOAGULANT THERAPY: ICD-10-CM

## 2021-03-30 LAB
INR PPP: 2 (ref 0.8–1.2)
PROTHROMBIN TIME: 20.5 SEC (ref 9–12.5)

## 2021-03-30 PROCEDURE — 93793 ANTICOAG MGMT PT WARFARIN: CPT | Mod: S$GLB,,,

## 2021-03-30 PROCEDURE — 93793 PR ANTICOAGULANT MGMT FOR PT TAKING WARFARIN: ICD-10-PCS | Mod: S$GLB,,,

## 2021-03-30 PROCEDURE — 36415 COLL VENOUS BLD VENIPUNCTURE: CPT | Performed by: INTERNAL MEDICINE

## 2021-03-30 PROCEDURE — 85610 PROTHROMBIN TIME: CPT | Performed by: INTERNAL MEDICINE

## 2021-04-01 ENCOUNTER — HOSPITAL ENCOUNTER (OUTPATIENT)
Dept: CARDIOLOGY | Facility: HOSPITAL | Age: 81
Discharge: HOME | End: 2021-04-01
Attending: PHYSICIAN ASSISTANT
Payer: MEDICARE

## 2021-04-01 DIAGNOSIS — I73.9 PVD (PERIPHERAL VASCULAR DISEASE): ICD-10-CM

## 2021-04-01 LAB
LEFT ANT TIBIAL SYS PSV: 11 CM/S
LEFT CFA PSV: 28 CM/S
LEFT EXTERNAL ILIAC PSV: 91 CM/S
LEFT PERONEAL SYS PSV: 58 CM/S
LEFT POPLITEAL PSV: 36 CM/S
LEFT POST TIBIAL SYS PSV: 47 CM/S
LEFT PROFUNDA SYS PSV: 55 CM/S
LEFT SUPER FEMORAL DIST SYS PSV: 94 CM/S
LEFT SUPER FEMORAL MID SYS PSV: 243 CM/S
LEFT SUPER FEMORAL OSTIAL SYS PSV: 135 CM/S
LEFT SUPER FEMORAL PROX SYS PSV: 101 CM/S
LEFT TIB/PER TRUNK SYS PSV: 43 CM/S
OHS CV LEFT LOWER EXTREMITY ABI (NO CALC): 1.06
OHS CV RIGHT ABI LOWER EXTREMITY (NO CALC): 0.89
RIGHT ANT TIBIAL SYS PSV: 21 CM/S
RIGHT CFA PSV: 64 CM/S
RIGHT EXTERNAL ILLIAC PSV: 101 CM/S
RIGHT PERONEAL SYS PSV: 34 CM/S
RIGHT POPLITEAL PSV: 34 CM/S
RIGHT POST TIBIAL SYS PSV: 0 CM/S
RIGHT PROFUNDA SYS PSV: 142 CM/S
RIGHT SUPER FEMORAL DIST SYS PSV: 51 CM/S
RIGHT SUPER FEMORAL MID SYS PSV: 66 CM/S
RIGHT SUPER FEMORAL OSTIAL SYS PSV: 147 CM/S
RIGHT SUPER FEMORAL PROX SYS PSV: 62 CM/S
RIGHT TIB/PER TRUNK SYS PSV: 53 CM/S

## 2021-04-01 PROCEDURE — 93925 CV US DOPPLER ARTERIAL LEGS BILATERAL (CUPID ONLY): ICD-10-PCS | Mod: 26,,, | Performed by: INTERNAL MEDICINE

## 2021-04-01 PROCEDURE — 93925 LOWER EXTREMITY STUDY: CPT

## 2021-04-01 PROCEDURE — 93925 LOWER EXTREMITY STUDY: CPT | Mod: 26,,, | Performed by: INTERNAL MEDICINE

## 2021-04-05 ENCOUNTER — TELEPHONE (OUTPATIENT)
Dept: CARDIOLOGY | Facility: CLINIC | Age: 81
End: 2021-04-05

## 2021-04-05 ENCOUNTER — PATIENT MESSAGE (OUTPATIENT)
Dept: CARDIOLOGY | Facility: CLINIC | Age: 81
End: 2021-04-05

## 2021-04-06 ENCOUNTER — TELEPHONE (OUTPATIENT)
Dept: CARDIOLOGY | Facility: CLINIC | Age: 81
End: 2021-04-06

## 2021-04-12 ENCOUNTER — TELEPHONE (OUTPATIENT)
Dept: INTERNAL MEDICINE | Facility: CLINIC | Age: 81
End: 2021-04-12

## 2021-04-28 ENCOUNTER — LAB VISIT (OUTPATIENT)
Dept: LAB | Facility: HOSPITAL | Age: 81
End: 2021-04-28
Attending: INTERNAL MEDICINE
Payer: MEDICARE

## 2021-04-28 ENCOUNTER — ANTI-COAG VISIT (OUTPATIENT)
Dept: CARDIOLOGY | Facility: CLINIC | Age: 81
End: 2021-04-28
Payer: MEDICARE

## 2021-04-28 DIAGNOSIS — Z79.01 LONG TERM CURRENT USE OF ANTICOAGULANT THERAPY: ICD-10-CM

## 2021-04-28 DIAGNOSIS — Z95.2 H/O MECHANICAL AORTIC VALVE REPLACEMENT: ICD-10-CM

## 2021-04-28 DIAGNOSIS — Z79.01 LONG TERM CURRENT USE OF ANTICOAGULANT THERAPY: Primary | ICD-10-CM

## 2021-04-28 LAB
INR PPP: 3.1 (ref 0.8–1.2)
PROTHROMBIN TIME: 31.7 SEC (ref 9–12.5)

## 2021-04-28 PROCEDURE — 36415 COLL VENOUS BLD VENIPUNCTURE: CPT | Performed by: INTERNAL MEDICINE

## 2021-04-28 PROCEDURE — 93793 PR ANTICOAGULANT MGMT FOR PT TAKING WARFARIN: ICD-10-PCS | Mod: S$GLB,,,

## 2021-04-28 PROCEDURE — 85610 PROTHROMBIN TIME: CPT | Performed by: INTERNAL MEDICINE

## 2021-04-28 PROCEDURE — 93793 ANTICOAG MGMT PT WARFARIN: CPT | Mod: S$GLB,,,

## 2021-05-17 ENCOUNTER — ANTI-COAG VISIT (OUTPATIENT)
Dept: CARDIOLOGY | Facility: CLINIC | Age: 81
End: 2021-05-17
Payer: MEDICARE

## 2021-05-17 ENCOUNTER — LAB VISIT (OUTPATIENT)
Dept: LAB | Facility: HOSPITAL | Age: 81
End: 2021-05-17
Attending: INTERNAL MEDICINE
Payer: MEDICARE

## 2021-05-17 DIAGNOSIS — Z79.01 LONG TERM CURRENT USE OF ANTICOAGULANT THERAPY: ICD-10-CM

## 2021-05-17 DIAGNOSIS — Z95.2 H/O MECHANICAL AORTIC VALVE REPLACEMENT: ICD-10-CM

## 2021-05-17 DIAGNOSIS — Z79.01 LONG TERM CURRENT USE OF ANTICOAGULANT THERAPY: Primary | ICD-10-CM

## 2021-05-17 LAB
INR PPP: 2.7 (ref 0.8–1.2)
PROTHROMBIN TIME: 27.6 SEC (ref 9–12.5)

## 2021-05-17 PROCEDURE — 85610 PROTHROMBIN TIME: CPT | Performed by: INTERNAL MEDICINE

## 2021-05-17 PROCEDURE — 93793 ANTICOAG MGMT PT WARFARIN: CPT | Mod: S$GLB,,,

## 2021-05-17 PROCEDURE — 93793 PR ANTICOAGULANT MGMT FOR PT TAKING WARFARIN: ICD-10-PCS | Mod: S$GLB,,,

## 2021-05-17 PROCEDURE — 36415 COLL VENOUS BLD VENIPUNCTURE: CPT | Performed by: INTERNAL MEDICINE

## 2021-06-14 ENCOUNTER — LAB VISIT (OUTPATIENT)
Dept: LAB | Facility: HOSPITAL | Age: 81
End: 2021-06-14
Attending: INTERNAL MEDICINE
Payer: MEDICARE

## 2021-06-14 ENCOUNTER — ANTI-COAG VISIT (OUTPATIENT)
Dept: CARDIOLOGY | Facility: CLINIC | Age: 81
End: 2021-06-14
Payer: MEDICARE

## 2021-06-14 DIAGNOSIS — Z79.01 LONG TERM CURRENT USE OF ANTICOAGULANT THERAPY: ICD-10-CM

## 2021-06-14 DIAGNOSIS — Z95.2 H/O MECHANICAL AORTIC VALVE REPLACEMENT: ICD-10-CM

## 2021-06-14 DIAGNOSIS — Z79.01 LONG TERM CURRENT USE OF ANTICOAGULANT THERAPY: Primary | ICD-10-CM

## 2021-06-14 LAB
INR PPP: 2.4 (ref 0.8–1.2)
PROTHROMBIN TIME: 24.5 SEC (ref 9–12.5)

## 2021-06-14 PROCEDURE — 85610 PROTHROMBIN TIME: CPT | Performed by: INTERNAL MEDICINE

## 2021-06-14 PROCEDURE — 93793 ANTICOAG MGMT PT WARFARIN: CPT | Mod: S$GLB,,,

## 2021-06-14 PROCEDURE — 36415 COLL VENOUS BLD VENIPUNCTURE: CPT | Performed by: INTERNAL MEDICINE

## 2021-06-14 PROCEDURE — 93793 PR ANTICOAGULANT MGMT FOR PT TAKING WARFARIN: ICD-10-PCS | Mod: S$GLB,,,

## 2021-07-02 DIAGNOSIS — M10.9 GOUT, UNSPECIFIED CAUSE, UNSPECIFIED CHRONICITY, UNSPECIFIED SITE: ICD-10-CM

## 2021-07-03 RX ORDER — ALLOPURINOL 100 MG/1
TABLET ORAL
Qty: 90 TABLET | Refills: 3 | Status: SHIPPED | OUTPATIENT
Start: 2021-07-03 | End: 2022-07-14 | Stop reason: SDUPTHER

## 2021-07-08 ENCOUNTER — ANTI-COAG VISIT (OUTPATIENT)
Dept: CARDIOLOGY | Facility: CLINIC | Age: 81
End: 2021-07-08
Payer: MEDICARE

## 2021-07-08 ENCOUNTER — LAB VISIT (OUTPATIENT)
Dept: LAB | Facility: HOSPITAL | Age: 81
End: 2021-07-08
Attending: INTERNAL MEDICINE
Payer: MEDICARE

## 2021-07-08 DIAGNOSIS — Z79.01 LONG TERM CURRENT USE OF ANTICOAGULANT THERAPY: Primary | ICD-10-CM

## 2021-07-08 DIAGNOSIS — Z95.2 H/O MECHANICAL AORTIC VALVE REPLACEMENT: ICD-10-CM

## 2021-07-08 DIAGNOSIS — Z79.01 LONG TERM CURRENT USE OF ANTICOAGULANT THERAPY: ICD-10-CM

## 2021-07-08 LAB
INR PPP: 1.3 (ref 0.8–1.2)
PROTHROMBIN TIME: 14.5 SEC (ref 9–12.5)

## 2021-07-08 PROCEDURE — 93793 PR ANTICOAGULANT MGMT FOR PT TAKING WARFARIN: ICD-10-PCS | Mod: S$GLB,,,

## 2021-07-08 PROCEDURE — 93793 ANTICOAG MGMT PT WARFARIN: CPT | Mod: S$GLB,,,

## 2021-07-08 PROCEDURE — 85610 PROTHROMBIN TIME: CPT | Performed by: INTERNAL MEDICINE

## 2021-07-08 PROCEDURE — 36415 COLL VENOUS BLD VENIPUNCTURE: CPT | Performed by: INTERNAL MEDICINE

## 2021-07-19 ENCOUNTER — LAB VISIT (OUTPATIENT)
Dept: LAB | Facility: HOSPITAL | Age: 81
End: 2021-07-19
Attending: INTERNAL MEDICINE
Payer: MEDICARE

## 2021-07-19 ENCOUNTER — ANTI-COAG VISIT (OUTPATIENT)
Dept: CARDIOLOGY | Facility: CLINIC | Age: 81
End: 2021-07-19
Payer: MEDICARE

## 2021-07-19 DIAGNOSIS — Z95.2 H/O MECHANICAL AORTIC VALVE REPLACEMENT: ICD-10-CM

## 2021-07-19 DIAGNOSIS — Z79.01 LONG TERM CURRENT USE OF ANTICOAGULANT THERAPY: Primary | ICD-10-CM

## 2021-07-19 DIAGNOSIS — Z79.01 LONG TERM CURRENT USE OF ANTICOAGULANT THERAPY: ICD-10-CM

## 2021-07-19 LAB
INR PPP: 2.3 (ref 0.8–1.2)
PROTHROMBIN TIME: 23.5 SEC (ref 9–12.5)

## 2021-07-19 PROCEDURE — 36415 COLL VENOUS BLD VENIPUNCTURE: CPT | Performed by: INTERNAL MEDICINE

## 2021-07-19 PROCEDURE — 93793 ANTICOAG MGMT PT WARFARIN: CPT | Mod: S$GLB,,,

## 2021-07-19 PROCEDURE — 85610 PROTHROMBIN TIME: CPT | Performed by: INTERNAL MEDICINE

## 2021-07-19 PROCEDURE — 93793 PR ANTICOAGULANT MGMT FOR PT TAKING WARFARIN: ICD-10-PCS | Mod: S$GLB,,,

## 2021-08-02 ENCOUNTER — LAB VISIT (OUTPATIENT)
Dept: LAB | Facility: HOSPITAL | Age: 81
End: 2021-08-02
Attending: INTERNAL MEDICINE
Payer: MEDICARE

## 2021-08-02 ENCOUNTER — ANTI-COAG VISIT (OUTPATIENT)
Dept: CARDIOLOGY | Facility: CLINIC | Age: 81
End: 2021-08-02
Payer: MEDICARE

## 2021-08-02 DIAGNOSIS — Z79.01 LONG TERM CURRENT USE OF ANTICOAGULANT THERAPY: Primary | ICD-10-CM

## 2021-08-02 DIAGNOSIS — Z95.2 H/O MECHANICAL AORTIC VALVE REPLACEMENT: ICD-10-CM

## 2021-08-02 DIAGNOSIS — Z79.01 LONG TERM CURRENT USE OF ANTICOAGULANT THERAPY: ICD-10-CM

## 2021-08-02 LAB
INR PPP: 2.6 (ref 0.8–1.2)
PROTHROMBIN TIME: 27.2 SEC (ref 9–12.5)

## 2021-08-02 PROCEDURE — 93793 PR ANTICOAGULANT MGMT FOR PT TAKING WARFARIN: ICD-10-PCS | Mod: S$GLB,,,

## 2021-08-02 PROCEDURE — 36415 COLL VENOUS BLD VENIPUNCTURE: CPT | Performed by: INTERNAL MEDICINE

## 2021-08-02 PROCEDURE — 85610 PROTHROMBIN TIME: CPT | Performed by: INTERNAL MEDICINE

## 2021-08-02 PROCEDURE — 93793 ANTICOAG MGMT PT WARFARIN: CPT | Mod: S$GLB,,,

## 2021-08-04 RX ORDER — GLIMEPIRIDE 1 MG/1
1 TABLET ORAL EVERY MORNING
Qty: 90 TABLET | Refills: 3 | Status: SHIPPED | OUTPATIENT
Start: 2021-08-04 | End: 2022-07-21

## 2021-08-19 RX ORDER — BUMETANIDE 2 MG/1
TABLET ORAL
Qty: 180 TABLET | Refills: 3 | Status: SHIPPED | OUTPATIENT
Start: 2021-08-19 | End: 2022-08-22

## 2021-08-24 ENCOUNTER — ANTI-COAG VISIT (OUTPATIENT)
Dept: CARDIOLOGY | Facility: CLINIC | Age: 81
End: 2021-08-24
Payer: MEDICARE

## 2021-08-24 ENCOUNTER — LAB VISIT (OUTPATIENT)
Dept: LAB | Facility: HOSPITAL | Age: 81
End: 2021-08-24
Attending: INTERNAL MEDICINE
Payer: MEDICARE

## 2021-08-24 DIAGNOSIS — Z79.01 LONG TERM CURRENT USE OF ANTICOAGULANT THERAPY: ICD-10-CM

## 2021-08-24 DIAGNOSIS — Z95.2 H/O MECHANICAL AORTIC VALVE REPLACEMENT: ICD-10-CM

## 2021-08-24 DIAGNOSIS — Z79.01 LONG TERM CURRENT USE OF ANTICOAGULANT THERAPY: Primary | ICD-10-CM

## 2021-08-24 LAB
INR PPP: 5 (ref 0.8–1.2)
INR PPP: 5.03
PROTHROMBIN TIME: 49.8 SEC (ref 9–12.5)

## 2021-08-24 PROCEDURE — 36415 COLL VENOUS BLD VENIPUNCTURE: CPT | Performed by: INTERNAL MEDICINE

## 2021-08-24 PROCEDURE — 93793 PR ANTICOAGULANT MGMT FOR PT TAKING WARFARIN: ICD-10-PCS | Mod: S$GLB,,,

## 2021-08-24 PROCEDURE — 93793 ANTICOAG MGMT PT WARFARIN: CPT | Mod: S$GLB,,,

## 2021-08-24 PROCEDURE — 85610 PROTHROMBIN TIME: CPT | Performed by: INTERNAL MEDICINE

## 2021-08-26 ENCOUNTER — LAB VISIT (OUTPATIENT)
Dept: LAB | Facility: HOSPITAL | Age: 81
End: 2021-08-26
Attending: INTERNAL MEDICINE
Payer: MEDICARE

## 2021-08-26 ENCOUNTER — ANTI-COAG VISIT (OUTPATIENT)
Dept: CARDIOLOGY | Facility: CLINIC | Age: 81
End: 2021-08-26
Payer: MEDICARE

## 2021-08-26 DIAGNOSIS — Z95.2 H/O MECHANICAL AORTIC VALVE REPLACEMENT: ICD-10-CM

## 2021-08-26 DIAGNOSIS — Z79.01 LONG TERM CURRENT USE OF ANTICOAGULANT THERAPY: Primary | ICD-10-CM

## 2021-08-26 DIAGNOSIS — Z79.01 LONG TERM CURRENT USE OF ANTICOAGULANT THERAPY: ICD-10-CM

## 2021-08-26 LAB
INR PPP: 2.9 (ref 0.8–1.2)
PROTHROMBIN TIME: 29.4 SEC (ref 9–12.5)

## 2021-08-26 PROCEDURE — 93793 PR ANTICOAGULANT MGMT FOR PT TAKING WARFARIN: ICD-10-PCS | Mod: S$GLB,,,

## 2021-08-26 PROCEDURE — 85610 PROTHROMBIN TIME: CPT | Performed by: INTERNAL MEDICINE

## 2021-08-26 PROCEDURE — 36415 COLL VENOUS BLD VENIPUNCTURE: CPT | Performed by: INTERNAL MEDICINE

## 2021-08-26 PROCEDURE — 93793 ANTICOAG MGMT PT WARFARIN: CPT | Mod: S$GLB,,,

## 2021-09-08 ENCOUNTER — ANTI-COAG VISIT (OUTPATIENT)
Dept: CARDIOLOGY | Facility: CLINIC | Age: 81
End: 2021-09-08
Payer: MEDICARE

## 2021-09-08 DIAGNOSIS — Z79.01 LONG TERM CURRENT USE OF ANTICOAGULANT THERAPY: Primary | ICD-10-CM

## 2021-09-08 DIAGNOSIS — Z95.2 H/O MECHANICAL AORTIC VALVE REPLACEMENT: ICD-10-CM

## 2021-09-08 LAB — INR PPP: 2.36

## 2021-09-08 PROCEDURE — 93793 PR ANTICOAGULANT MGMT FOR PT TAKING WARFARIN: ICD-10-PCS | Mod: S$GLB,,,

## 2021-09-08 PROCEDURE — 93793 ANTICOAG MGMT PT WARFARIN: CPT | Mod: S$GLB,,,

## 2021-09-21 ENCOUNTER — ANTI-COAG VISIT (OUTPATIENT)
Dept: CARDIOLOGY | Facility: CLINIC | Age: 81
End: 2021-09-21
Payer: MEDICARE

## 2021-09-21 ENCOUNTER — LAB VISIT (OUTPATIENT)
Dept: LAB | Facility: HOSPITAL | Age: 81
End: 2021-09-21
Attending: INTERNAL MEDICINE
Payer: MEDICARE

## 2021-09-21 DIAGNOSIS — Z95.2 H/O MECHANICAL AORTIC VALVE REPLACEMENT: ICD-10-CM

## 2021-09-21 DIAGNOSIS — Z79.01 LONG TERM CURRENT USE OF ANTICOAGULANT THERAPY: ICD-10-CM

## 2021-09-21 DIAGNOSIS — Z79.01 LONG TERM CURRENT USE OF ANTICOAGULANT THERAPY: Primary | ICD-10-CM

## 2021-09-21 LAB
INR PPP: 2.8 (ref 0.8–1.2)
PROTHROMBIN TIME: 28.7 SEC (ref 9–12.5)

## 2021-09-21 PROCEDURE — 36415 COLL VENOUS BLD VENIPUNCTURE: CPT | Performed by: INTERNAL MEDICINE

## 2021-09-21 PROCEDURE — 93793 PR ANTICOAGULANT MGMT FOR PT TAKING WARFARIN: ICD-10-PCS | Mod: S$GLB,,,

## 2021-09-21 PROCEDURE — 93793 ANTICOAG MGMT PT WARFARIN: CPT | Mod: S$GLB,,,

## 2021-09-21 PROCEDURE — 85610 PROTHROMBIN TIME: CPT | Performed by: INTERNAL MEDICINE

## 2021-09-29 ENCOUNTER — OFFICE VISIT (OUTPATIENT)
Dept: CARDIOLOGY | Facility: CLINIC | Age: 81
End: 2021-09-29
Payer: MEDICARE

## 2021-09-29 ENCOUNTER — HOSPITAL ENCOUNTER (OUTPATIENT)
Dept: CARDIOLOGY | Facility: HOSPITAL | Age: 81
Discharge: HOME | End: 2021-09-29
Attending: PHYSICIAN ASSISTANT
Payer: MEDICARE

## 2021-09-29 ENCOUNTER — DOCUMENTATION ONLY (OUTPATIENT)
Dept: CARDIOLOGY | Facility: CLINIC | Age: 81
End: 2021-09-29

## 2021-09-29 VITALS
HEART RATE: 55 BPM | BODY MASS INDEX: 30.76 KG/M2 | SYSTOLIC BLOOD PRESSURE: 132 MMHG | HEIGHT: 66 IN | OXYGEN SATURATION: 97 % | DIASTOLIC BLOOD PRESSURE: 62 MMHG | WEIGHT: 191.38 LBS

## 2021-09-29 DIAGNOSIS — I65.23 BILATERAL CAROTID ARTERY STENOSIS: ICD-10-CM

## 2021-09-29 DIAGNOSIS — E66.9 OBESITY (BMI 30.0-34.9): ICD-10-CM

## 2021-09-29 DIAGNOSIS — I25.708 CORONARY ARTERY DISEASE OF BYPASS GRAFT OF NATIVE HEART WITH STABLE ANGINA PECTORIS: Primary | ICD-10-CM

## 2021-09-29 DIAGNOSIS — N18.2 STAGE 2 CHRONIC KIDNEY DISEASE: Primary | ICD-10-CM

## 2021-09-29 DIAGNOSIS — Z79.01 LONG TERM CURRENT USE OF ANTICOAGULANT THERAPY: ICD-10-CM

## 2021-09-29 DIAGNOSIS — I25.118 CORONARY ARTERY DISEASE OF NATIVE ARTERY OF NATIVE HEART WITH STABLE ANGINA PECTORIS: ICD-10-CM

## 2021-09-29 DIAGNOSIS — Z95.2 H/O MECHANICAL AORTIC VALVE REPLACEMENT: ICD-10-CM

## 2021-09-29 DIAGNOSIS — I73.9 PAD (PERIPHERAL ARTERY DISEASE): ICD-10-CM

## 2021-09-29 DIAGNOSIS — I73.9 CLAUDICATION: ICD-10-CM

## 2021-09-29 DIAGNOSIS — R06.09 DOE (DYSPNEA ON EXERTION): ICD-10-CM

## 2021-09-29 LAB
LEFT CBA DIAS: 9 CM/S
LEFT CBA SYS: 128 CM/S
LEFT CCA DIST DIAS: 11 CM/S
LEFT CCA DIST SYS: 100 CM/S
LEFT CCA MID DIAS: 12 CM/S
LEFT CCA MID SYS: 114 CM/S
LEFT CCA PROX DIAS: 5 CM/S
LEFT CCA PROX SYS: 67 CM/S
LEFT ECA DIAS: 0 CM/S
LEFT ECA SYS: 135 CM/S
LEFT ICA DIST DIAS: 19 CM/S
LEFT ICA DIST SYS: 87 CM/S
LEFT ICA MID DIAS: 61 CM/S
LEFT ICA MID SYS: 133 CM/S
LEFT ICA PROX DIAS: 33 CM/S
LEFT ICA PROX SYS: 169 CM/S
LEFT VERTEBRAL DIAS: 9 CM/S
LEFT VERTEBRAL SYS: 43 CM/S
OHS CV CAROTID RIGHT ICA EDV HIGHEST: 19
OHS CV CAROTID ULTRASOUND LEFT ICA/CCA RATIO: 1.69
OHS CV CAROTID ULTRASOUND RIGHT ICA/CCA RATIO: 2.01
OHS CV PV CAROTID LEFT HIGHEST CCA: 114
OHS CV PV CAROTID LEFT HIGHEST ICA: 169
OHS CV PV CAROTID RIGHT HIGHEST CCA: 69
OHS CV PV CAROTID RIGHT HIGHEST ICA: 139
OHS CV US CAROTID LEFT HIGHEST EDV: 61
RIGHT ARM DIASTOLIC BLOOD PRESSURE: 60 MMHG
RIGHT ARM SYSTOLIC BLOOD PRESSURE: 110 MMHG
RIGHT CBA DIAS: 17 CM/S
RIGHT CBA SYS: 96 CM/S
RIGHT CCA DIST DIAS: 9 CM/S
RIGHT CCA DIST SYS: 69 CM/S
RIGHT CCA MID DIAS: 10 CM/S
RIGHT CCA MID SYS: 60 CM/S
RIGHT CCA PROX DIAS: 7 CM/S
RIGHT CCA PROX SYS: 62 CM/S
RIGHT ECA DIAS: 0 CM/S
RIGHT ECA SYS: 255 CM/S
RIGHT ICA DIST DIAS: 13 CM/S
RIGHT ICA DIST SYS: 93 CM/S
RIGHT ICA MID DIAS: 17 CM/S
RIGHT ICA MID SYS: 128 CM/S
RIGHT ICA PROX DIAS: 19 CM/S
RIGHT ICA PROX SYS: 139 CM/S
RIGHT VERTEBRAL DIAS: 10 CM/S
RIGHT VERTEBRAL SYS: 81 CM/S

## 2021-09-29 PROCEDURE — 99999 PR PBB SHADOW E&M-EST. PATIENT-LVL III: CPT | Mod: PBBFAC,HCNC,, | Performed by: INTERNAL MEDICINE

## 2021-09-29 PROCEDURE — 1125F AMNT PAIN NOTED PAIN PRSNT: CPT | Mod: HCNC,CPTII,S$GLB, | Performed by: INTERNAL MEDICINE

## 2021-09-29 PROCEDURE — 93880 EXTRACRANIAL BILAT STUDY: CPT | Mod: HCNC

## 2021-09-29 PROCEDURE — 99215 PR OFFICE/OUTPT VISIT, EST, LEVL V, 40-54 MIN: ICD-10-PCS | Mod: HCNC,GC,S$GLB, | Performed by: INTERNAL MEDICINE

## 2021-09-29 PROCEDURE — 1160F RVW MEDS BY RX/DR IN RCRD: CPT | Mod: HCNC,CPTII,S$GLB, | Performed by: INTERNAL MEDICINE

## 2021-09-29 PROCEDURE — 99999 PR PBB SHADOW E&M-EST. PATIENT-LVL III: ICD-10-PCS | Mod: PBBFAC,HCNC,, | Performed by: INTERNAL MEDICINE

## 2021-09-29 PROCEDURE — 93880 EXTRACRANIAL BILAT STUDY: CPT | Mod: 26,HCNC,, | Performed by: INTERNAL MEDICINE

## 2021-09-29 PROCEDURE — 93880 CV US DOPPLER CAROTID (CUPID ONLY): ICD-10-PCS | Mod: 26,HCNC,, | Performed by: INTERNAL MEDICINE

## 2021-09-29 PROCEDURE — 1160F PR REVIEW ALL MEDS BY PRESCRIBER/CLIN PHARMACIST DOCUMENTED: ICD-10-PCS | Mod: HCNC,CPTII,S$GLB, | Performed by: INTERNAL MEDICINE

## 2021-09-29 PROCEDURE — 3078F PR MOST RECENT DIASTOLIC BLOOD PRESSURE < 80 MM HG: ICD-10-PCS | Mod: HCNC,CPTII,S$GLB, | Performed by: INTERNAL MEDICINE

## 2021-09-29 PROCEDURE — 1159F PR MEDICATION LIST DOCUMENTED IN MEDICAL RECORD: ICD-10-PCS | Mod: HCNC,CPTII,S$GLB, | Performed by: INTERNAL MEDICINE

## 2021-09-29 PROCEDURE — 3075F PR MOST RECENT SYSTOLIC BLOOD PRESS GE 130-139MM HG: ICD-10-PCS | Mod: HCNC,CPTII,S$GLB, | Performed by: INTERNAL MEDICINE

## 2021-09-29 PROCEDURE — 1159F MED LIST DOCD IN RCRD: CPT | Mod: HCNC,CPTII,S$GLB, | Performed by: INTERNAL MEDICINE

## 2021-09-29 PROCEDURE — 99215 OFFICE O/P EST HI 40 MIN: CPT | Mod: HCNC,GC,S$GLB, | Performed by: INTERNAL MEDICINE

## 2021-09-29 PROCEDURE — 1125F PR PAIN SEVERITY QUANTIFIED, PAIN PRESENT: ICD-10-PCS | Mod: HCNC,CPTII,S$GLB, | Performed by: INTERNAL MEDICINE

## 2021-09-29 PROCEDURE — 3075F SYST BP GE 130 - 139MM HG: CPT | Mod: HCNC,CPTII,S$GLB, | Performed by: INTERNAL MEDICINE

## 2021-09-29 PROCEDURE — 3078F DIAST BP <80 MM HG: CPT | Mod: HCNC,CPTII,S$GLB, | Performed by: INTERNAL MEDICINE

## 2021-09-29 RX ORDER — CLOPIDOGREL BISULFATE 75 MG/1
300 TABLET ORAL ONCE
Qty: 4 TABLET | Refills: 0 | Status: SHIPPED | OUTPATIENT
Start: 2021-09-29 | End: 2021-09-29

## 2021-09-29 RX ORDER — SODIUM CHLORIDE 9 MG/ML
INJECTION, SOLUTION INTRAVENOUS ONCE
Status: CANCELLED | OUTPATIENT
Start: 2021-09-29

## 2021-09-29 RX ORDER — DIPHENHYDRAMINE HCL 50 MG
50 CAPSULE ORAL ONCE
Status: CANCELLED | OUTPATIENT
Start: 2021-09-29 | End: 2021-09-29

## 2021-09-29 RX ORDER — ENOXAPARIN SODIUM 150 MG/ML
120 INJECTION SUBCUTANEOUS DAILY
Qty: 5 SYRINGE | Refills: 0 | Status: SHIPPED | OUTPATIENT
Start: 2021-09-29 | End: 2021-09-29

## 2021-09-29 RX ORDER — ENOXAPARIN SODIUM 100 MG/ML
1 INJECTION SUBCUTANEOUS 2 TIMES DAILY
Qty: 9 ML | Refills: 0 | Status: SHIPPED | OUTPATIENT
Start: 2021-09-29 | End: 2021-09-29

## 2021-09-29 RX ORDER — ENOXAPARIN SODIUM 100 MG/ML
1 INJECTION SUBCUTANEOUS
Status: DISCONTINUED | OUTPATIENT
Start: 2021-09-29 | End: 2021-09-29

## 2021-09-29 RX ORDER — ENOXAPARIN SODIUM 150 MG/ML
120 INJECTION SUBCUTANEOUS DAILY
Qty: 4 ML | Refills: 0 | Status: SHIPPED | OUTPATIENT
Start: 2021-09-29 | End: 2021-10-07 | Stop reason: SDUPTHER

## 2021-09-30 ENCOUNTER — TELEPHONE (OUTPATIENT)
Dept: CARDIOLOGY | Facility: CLINIC | Age: 81
End: 2021-09-30

## 2021-09-30 PROBLEM — I25.110 ATHEROSCLEROSIS OF NATIVE CORONARY ARTERY OF NATIVE HEART WITH UNSTABLE ANGINA PECTORIS: Status: ACTIVE | Noted: 2021-09-29

## 2021-10-01 ENCOUNTER — TELEPHONE (OUTPATIENT)
Dept: CARDIOLOGY | Facility: CLINIC | Age: 81
End: 2021-10-01

## 2021-10-01 ENCOUNTER — LAB VISIT (OUTPATIENT)
Dept: SPORTS MEDICINE | Facility: CLINIC | Age: 81
End: 2021-10-01
Payer: MEDICARE

## 2021-10-01 DIAGNOSIS — I25.708 CORONARY ARTERY DISEASE OF BYPASS GRAFT OF NATIVE HEART WITH STABLE ANGINA PECTORIS: ICD-10-CM

## 2021-10-01 LAB
SARS-COV-2 RNA RESP QL NAA+PROBE: NOT DETECTED
SARS-COV-2- CYCLE NUMBER: NORMAL

## 2021-10-01 PROCEDURE — U0003 INFECTIOUS AGENT DETECTION BY NUCLEIC ACID (DNA OR RNA); SEVERE ACUTE RESPIRATORY SYNDROME CORONAVIRUS 2 (SARS-COV-2) (CORONAVIRUS DISEASE [COVID-19]), AMPLIFIED PROBE TECHNIQUE, MAKING USE OF HIGH THROUGHPUT TECHNOLOGIES AS DESCRIBED BY CMS-2020-01-R: HCPCS | Mod: HCNC | Performed by: INTERNAL MEDICINE

## 2021-10-01 PROCEDURE — U0005 INFEC AGEN DETEC AMPLI PROBE: HCPCS | Performed by: INTERNAL MEDICINE

## 2021-10-04 ENCOUNTER — HOSPITAL ENCOUNTER (OUTPATIENT)
Facility: HOSPITAL | Age: 81
Discharge: HOME | End: 2021-10-04
Attending: INTERNAL MEDICINE | Admitting: INTERNAL MEDICINE
Payer: MEDICARE

## 2021-10-04 VITALS
OXYGEN SATURATION: 95 % | BODY MASS INDEX: 30.86 KG/M2 | WEIGHT: 192 LBS | TEMPERATURE: 98 F | DIASTOLIC BLOOD PRESSURE: 68 MMHG | SYSTOLIC BLOOD PRESSURE: 156 MMHG | HEIGHT: 66 IN | HEART RATE: 53 BPM | RESPIRATION RATE: 18 BRPM

## 2021-10-04 DIAGNOSIS — I25.118 CORONARY ARTERY DISEASE OF NATIVE ARTERY OF NATIVE HEART WITH STABLE ANGINA PECTORIS: ICD-10-CM

## 2021-10-04 DIAGNOSIS — N18.2 STAGE 2 CHRONIC KIDNEY DISEASE: ICD-10-CM

## 2021-10-04 DIAGNOSIS — R06.09 DOE (DYSPNEA ON EXERTION): ICD-10-CM

## 2021-10-04 DIAGNOSIS — Z79.01 LONG TERM CURRENT USE OF ANTICOAGULANT THERAPY: ICD-10-CM

## 2021-10-04 DIAGNOSIS — Z95.2 H/O MECHANICAL AORTIC VALVE REPLACEMENT: ICD-10-CM

## 2021-10-04 LAB — POCT GLUCOSE: 86 MG/DL (ref 70–110)

## 2021-10-04 PROCEDURE — C1894 INTRO/SHEATH, NON-LASER: HCPCS | Mod: HCNC | Performed by: INTERNAL MEDICINE

## 2021-10-04 PROCEDURE — 75625 CONTRAST EXAM ABDOMINL AORTA: CPT | Mod: HCNC | Performed by: INTERNAL MEDICINE

## 2021-10-04 PROCEDURE — 99152 MOD SED SAME PHYS/QHP 5/>YRS: CPT | Mod: HCNC | Performed by: INTERNAL MEDICINE

## 2021-10-04 PROCEDURE — 25500020 PHARM REV CODE 255: Mod: HCNC | Performed by: INTERNAL MEDICINE

## 2021-10-04 PROCEDURE — 99152 MOD SED SAME PHYS/QHP 5/>YRS: CPT | Mod: HCNC,,, | Performed by: INTERNAL MEDICINE

## 2021-10-04 PROCEDURE — 75625 CONTRAST EXAM ABDOMINL AORTA: CPT | Mod: 26,HCNC,59, | Performed by: INTERNAL MEDICINE

## 2021-10-04 PROCEDURE — 93010 ELECTROCARDIOGRAM REPORT: CPT | Mod: HCNC,,, | Performed by: INTERNAL MEDICINE

## 2021-10-04 PROCEDURE — 75625 PR  ANGIO AORTOGRAM ABD SERIAL: ICD-10-PCS | Mod: 26,HCNC,59, | Performed by: INTERNAL MEDICINE

## 2021-10-04 PROCEDURE — 99153 MOD SED SAME PHYS/QHP EA: CPT | Mod: HCNC | Performed by: INTERNAL MEDICINE

## 2021-10-04 PROCEDURE — 93005 ELECTROCARDIOGRAM TRACING: CPT | Mod: HCNC,59

## 2021-10-04 PROCEDURE — 93459: ICD-10-PCS | Mod: 26,HCNC,, | Performed by: INTERNAL MEDICINE

## 2021-10-04 PROCEDURE — 25000003 PHARM REV CODE 250: Mod: HCNC | Performed by: INTERNAL MEDICINE

## 2021-10-04 PROCEDURE — 93010 EKG 12-LEAD: ICD-10-PCS | Mod: HCNC,,, | Performed by: INTERNAL MEDICINE

## 2021-10-04 PROCEDURE — 93459 L HRT ART/GRFT ANGIO: CPT | Mod: HCNC | Performed by: INTERNAL MEDICINE

## 2021-10-04 PROCEDURE — 99152 PR MOD CONSCIOUS SEDATION, SAME PHYS, 5+ YRS, FIRST 15 MIN: ICD-10-PCS | Mod: HCNC,,, | Performed by: INTERNAL MEDICINE

## 2021-10-04 PROCEDURE — 93459 L HRT ART/GRFT ANGIO: CPT | Mod: 26,HCNC,, | Performed by: INTERNAL MEDICINE

## 2021-10-04 PROCEDURE — C1769 GUIDE WIRE: HCPCS | Mod: HCNC | Performed by: INTERNAL MEDICINE

## 2021-10-04 PROCEDURE — 63600175 PHARM REV CODE 636 W HCPCS: Mod: HCNC | Performed by: INTERNAL MEDICINE

## 2021-10-04 PROCEDURE — 82962 GLUCOSE BLOOD TEST: CPT | Mod: HCNC | Performed by: INTERNAL MEDICINE

## 2021-10-04 RX ORDER — HEPARIN SODIUM 1000 [USP'U]/ML
INJECTION, SOLUTION INTRAVENOUS; SUBCUTANEOUS
Status: DISCONTINUED | OUTPATIENT
Start: 2021-10-04 | End: 2021-10-04 | Stop reason: HOSPADM

## 2021-10-04 RX ORDER — WARFARIN SODIUM 5 MG/1
TABLET ORAL
Qty: 143 TABLET | Refills: 0 | Status: ON HOLD | OUTPATIENT
Start: 2021-10-05 | End: 2022-10-25 | Stop reason: SDUPTHER

## 2021-10-04 RX ORDER — DIPHENHYDRAMINE HCL 50 MG
50 CAPSULE ORAL ONCE
Status: COMPLETED | OUTPATIENT
Start: 2021-10-04 | End: 2021-10-04

## 2021-10-04 RX ORDER — MIDAZOLAM HYDROCHLORIDE 1 MG/ML
INJECTION, SOLUTION INTRAMUSCULAR; INTRAVENOUS
Status: DISCONTINUED | OUTPATIENT
Start: 2021-10-04 | End: 2021-10-04 | Stop reason: HOSPADM

## 2021-10-04 RX ORDER — HEPARIN SOD,PORCINE/0.9 % NACL 1000/500ML
INTRAVENOUS SOLUTION INTRAVENOUS
Status: DISCONTINUED | OUTPATIENT
Start: 2021-10-04 | End: 2021-10-04 | Stop reason: HOSPADM

## 2021-10-04 RX ORDER — WARFARIN SODIUM 5 MG/1
TABLET ORAL
Qty: 180 TABLET | Refills: 3 | Status: SHIPPED | OUTPATIENT
Start: 2021-10-05 | End: 2022-02-03

## 2021-10-04 RX ORDER — FENTANYL CITRATE 50 UG/ML
INJECTION, SOLUTION INTRAMUSCULAR; INTRAVENOUS
Status: DISCONTINUED | OUTPATIENT
Start: 2021-10-04 | End: 2021-10-04 | Stop reason: HOSPADM

## 2021-10-04 RX ORDER — LIDOCAINE HYDROCHLORIDE 20 MG/ML
INJECTION, SOLUTION INFILTRATION; PERINEURAL
Status: DISCONTINUED | OUTPATIENT
Start: 2021-10-04 | End: 2021-10-04 | Stop reason: HOSPADM

## 2021-10-04 RX ORDER — SODIUM CHLORIDE 9 MG/ML
INJECTION, SOLUTION INTRAVENOUS ONCE
Status: COMPLETED | OUTPATIENT
Start: 2021-10-04 | End: 2021-10-04

## 2021-10-04 RX ORDER — SODIUM CHLORIDE 9 MG/ML
INJECTION, SOLUTION INTRAVENOUS CONTINUOUS
Status: ACTIVE | OUTPATIENT
Start: 2021-10-04 | End: 2021-10-04

## 2021-10-04 RX ADMIN — DIPHENHYDRAMINE HYDROCHLORIDE 50 MG: 50 CAPSULE ORAL at 08:10

## 2021-10-04 RX ADMIN — SODIUM CHLORIDE: 0.9 INJECTION, SOLUTION INTRAVENOUS at 08:10

## 2021-10-05 DIAGNOSIS — I20.0 UNSTABLE ANGINA: Primary | ICD-10-CM

## 2021-10-05 DIAGNOSIS — I25.118 ATHEROSCLEROSIS OF NATIVE CORONARY ARTERY OF NATIVE HEART WITH STABLE ANGINA PECTORIS: ICD-10-CM

## 2021-10-05 DIAGNOSIS — N18.32 STAGE 3B CHRONIC KIDNEY DISEASE: Primary | ICD-10-CM

## 2021-10-05 RX ORDER — DIPHENHYDRAMINE HCL 50 MG
50 CAPSULE ORAL ONCE
Status: CANCELLED | OUTPATIENT
Start: 2021-10-05 | End: 2021-10-05

## 2021-10-05 RX ORDER — SODIUM CHLORIDE 9 MG/ML
INJECTION, SOLUTION INTRAVENOUS CONTINUOUS
Status: CANCELLED | OUTPATIENT
Start: 2021-10-05 | End: 2021-10-05

## 2021-10-07 ENCOUNTER — ANTI-COAG VISIT (OUTPATIENT)
Dept: CARDIOLOGY | Facility: CLINIC | Age: 81
End: 2021-10-07
Payer: MEDICARE

## 2021-10-07 ENCOUNTER — LAB VISIT (OUTPATIENT)
Dept: LAB | Facility: HOSPITAL | Age: 81
End: 2021-10-07
Attending: INTERNAL MEDICINE
Payer: MEDICARE

## 2021-10-07 DIAGNOSIS — Z79.01 LONG TERM CURRENT USE OF ANTICOAGULANT THERAPY: ICD-10-CM

## 2021-10-07 DIAGNOSIS — Z95.2 H/O MECHANICAL AORTIC VALVE REPLACEMENT: ICD-10-CM

## 2021-10-07 DIAGNOSIS — Z79.01 LONG TERM CURRENT USE OF ANTICOAGULANT THERAPY: Primary | ICD-10-CM

## 2021-10-07 LAB
INR PPP: 1.5 (ref 0.8–1.2)
PROTHROMBIN TIME: 16.1 SEC (ref 9–12.5)

## 2021-10-07 PROCEDURE — 85610 PROTHROMBIN TIME: CPT | Performed by: INTERNAL MEDICINE

## 2021-10-07 PROCEDURE — 36415 COLL VENOUS BLD VENIPUNCTURE: CPT | Performed by: INTERNAL MEDICINE

## 2021-10-07 PROCEDURE — 93793 PR ANTICOAGULANT MGMT FOR PT TAKING WARFARIN: ICD-10-PCS | Mod: S$GLB,,,

## 2021-10-07 PROCEDURE — 93793 ANTICOAG MGMT PT WARFARIN: CPT | Mod: S$GLB,,,

## 2021-10-07 RX ORDER — ENOXAPARIN SODIUM 150 MG/ML
120 INJECTION SUBCUTANEOUS DAILY
Qty: 8 ML | Refills: 0 | Status: SHIPPED | OUTPATIENT
Start: 2021-10-07 | End: 2021-10-17

## 2021-10-11 ENCOUNTER — ANTI-COAG VISIT (OUTPATIENT)
Dept: CARDIOLOGY | Facility: CLINIC | Age: 81
End: 2021-10-11
Payer: MEDICARE

## 2021-10-11 ENCOUNTER — LAB VISIT (OUTPATIENT)
Dept: LAB | Facility: HOSPITAL | Age: 81
End: 2021-10-11
Attending: INTERNAL MEDICINE
Payer: MEDICARE

## 2021-10-11 DIAGNOSIS — Z95.2 H/O MECHANICAL AORTIC VALVE REPLACEMENT: ICD-10-CM

## 2021-10-11 DIAGNOSIS — Z79.01 LONG TERM CURRENT USE OF ANTICOAGULANT THERAPY: Primary | ICD-10-CM

## 2021-10-11 DIAGNOSIS — Z79.01 LONG TERM CURRENT USE OF ANTICOAGULANT THERAPY: ICD-10-CM

## 2021-10-11 LAB
INR PPP: 2.1 (ref 0.8–1.2)
PROTHROMBIN TIME: 22.4 SEC (ref 9–12.5)

## 2021-10-11 PROCEDURE — 93793 ANTICOAG MGMT PT WARFARIN: CPT | Mod: S$GLB,,,

## 2021-10-11 PROCEDURE — 93793 PR ANTICOAGULANT MGMT FOR PT TAKING WARFARIN: ICD-10-PCS | Mod: S$GLB,,,

## 2021-10-11 PROCEDURE — 85610 PROTHROMBIN TIME: CPT | Performed by: INTERNAL MEDICINE

## 2021-10-11 PROCEDURE — 36415 COLL VENOUS BLD VENIPUNCTURE: CPT | Performed by: INTERNAL MEDICINE

## 2021-10-25 ENCOUNTER — ANTI-COAG VISIT (OUTPATIENT)
Dept: CARDIOLOGY | Facility: CLINIC | Age: 81
End: 2021-10-25
Payer: MEDICARE

## 2021-10-25 ENCOUNTER — LAB VISIT (OUTPATIENT)
Dept: LAB | Facility: HOSPITAL | Age: 81
End: 2021-10-25
Attending: INTERNAL MEDICINE
Payer: MEDICARE

## 2021-10-25 DIAGNOSIS — Z79.01 LONG TERM CURRENT USE OF ANTICOAGULANT THERAPY: Primary | ICD-10-CM

## 2021-10-25 DIAGNOSIS — Z95.2 H/O MECHANICAL AORTIC VALVE REPLACEMENT: ICD-10-CM

## 2021-10-25 DIAGNOSIS — Z79.01 LONG TERM CURRENT USE OF ANTICOAGULANT THERAPY: ICD-10-CM

## 2021-10-25 LAB
INR PPP: 1.7 (ref 0.8–1.2)
PROTHROMBIN TIME: 18.5 SEC (ref 9–12.5)

## 2021-10-25 PROCEDURE — 93793 PR ANTICOAGULANT MGMT FOR PT TAKING WARFARIN: ICD-10-PCS | Mod: S$GLB,,,

## 2021-10-25 PROCEDURE — 93793 ANTICOAG MGMT PT WARFARIN: CPT | Mod: S$GLB,,,

## 2021-10-25 PROCEDURE — 36415 COLL VENOUS BLD VENIPUNCTURE: CPT | Mod: HCNC | Performed by: INTERNAL MEDICINE

## 2021-10-25 PROCEDURE — 85610 PROTHROMBIN TIME: CPT | Mod: HCNC | Performed by: INTERNAL MEDICINE

## 2021-11-01 ENCOUNTER — TELEPHONE (OUTPATIENT)
Dept: CARDIOLOGY | Facility: HOSPITAL | Age: 81
End: 2021-11-01
Payer: MEDICARE

## 2021-11-03 ENCOUNTER — HOSPITAL ENCOUNTER (OUTPATIENT)
Dept: CARDIOLOGY | Facility: HOSPITAL | Age: 81
Discharge: HOME | End: 2021-11-03
Payer: MEDICARE

## 2021-11-03 VITALS
HEART RATE: 57 BPM | SYSTOLIC BLOOD PRESSURE: 138 MMHG | WEIGHT: 192 LBS | HEIGHT: 66 IN | DIASTOLIC BLOOD PRESSURE: 65 MMHG | BODY MASS INDEX: 30.86 KG/M2

## 2021-11-03 DIAGNOSIS — I20.0 UNSTABLE ANGINA: ICD-10-CM

## 2021-11-03 LAB
ASCENDING AORTA: 3.16 CM
AV INDEX (PROSTH): 0.82
AV MEAN GRADIENT: 5 MMHG
AV PEAK GRADIENT: 7 MMHG
AV VALVE AREA: 2.64 CM2
AV VELOCITY RATIO: 0.63
CFR FLOW - ANTERIOR: 1.26
CFR FLOW - INFERIOR: 1.24
CFR FLOW - LATERAL: 1.19
CFR FLOW - MAX: 1.65
CFR FLOW - MIN: 0.83
CFR FLOW - SEPTAL: 1.29
CFR FLOW - WHOLE HEART: 1.25
CV ECHO LV RWT: 0.44 CM
CV PHARM DOSE: 48.9 MG
CV STRESS BASE HR: 57 BPM
DIASTOLIC BLOOD PRESSURE: 65 MMHG
DOP CALC AO PEAK VEL: 1.35 M/S
DOP CALC AO VTI: 23.06 CM
DOP CALC LVOT AREA: 3.2 CM2
DOP CALC LVOT DIAMETER: 2.03 CM
DOP CALC LVOT PEAK VEL: 0.85 M/S
DOP CALC LVOT STROKE VOLUME: 60.98 CM3
DOP CALCLVOT PEAK VEL VTI: 18.85 CM
E WAVE DECELERATION TIME: 228.36 MSEC
E/A RATIO: 0.95
E/E' RATIO: 13.87 M/S
ECHO LV POSTERIOR WALL: 1.01 CM (ref 0.6–1.1)
EJECTION FRACTION- HIGH: 65 %
END DIASTOLIC INDEX-HIGH: 153 ML/M2
END DIASTOLIC INDEX-LOW: 93 ML/M2
END SYSTOLIC INDEX-HIGH: 71 ML/M2
END SYSTOLIC INDEX-LOW: 31 ML/M2
FRACTIONAL SHORTENING: 24 % (ref 28–44)
INTERVENTRICULAR SEPTUM: 0.97 CM (ref 0.6–1.1)
LA MAJOR: 5.1 CM
LA MINOR: 4.92 CM
LA WIDTH: 4.41 CM
LEFT ATRIUM SIZE: 3.15 CM
LEFT ATRIUM VOLUME INDEX: 30 ML/M2
LEFT ATRIUM VOLUME: 59.14 CM3
LEFT INTERNAL DIMENSION IN SYSTOLE: 3.46 CM (ref 2.1–4)
LEFT VENTRICLE DIASTOLIC VOLUME INDEX: 48.57 ML/M2
LEFT VENTRICLE DIASTOLIC VOLUME: 95.69 ML
LEFT VENTRICLE MASS INDEX: 79 G/M2
LEFT VENTRICLE SYSTOLIC VOLUME INDEX: 25.2 ML/M2
LEFT VENTRICLE SYSTOLIC VOLUME: 49.61 ML
LEFT VENTRICULAR INTERNAL DIMENSION IN DIASTOLE: 4.57 CM (ref 3.5–6)
LEFT VENTRICULAR MASS: 154.99 G
LV LATERAL E/E' RATIO: 13 M/S
LV SEPTAL E/E' RATIO: 14.86 M/S
MV PEAK A VEL: 1.1 M/S
MV PEAK E VEL: 1.04 M/S
MV STENOSIS PRESSURE HALF TIME: 66.22 MS
MV VALVE AREA P 1/2 METHOD: 3.32 CM2
NUC REST DIASTOLIC VOLUME INDEX: 186
NUC REST EJECTION FRACTION: 44
NUC REST SYSTOLIC VOLUME INDEX: 104
NUC STRESS DIASTOLIC VOLUME INDEX: 186
NUC STRESS EJECTION FRACTION: 46 %
NUC STRESS SYSTOLIC VOLUME INDEX: 100
OHS CV CPX 85 PERCENT MAX PREDICTED HEART RATE MALE: 119
OHS CV CPX MAX PREDICTED HEART RATE: 140
OHS CV CPX PATIENT IS FEMALE: 0
OHS CV CPX PATIENT IS MALE: 1
OHS CV CPX PEAK DIASTOLIC BLOOD PRESSURE: 55 MMHG
OHS CV CPX PEAK HEAR RATE: 56 BPM
OHS CV CPX PEAK RATE PRESSURE PRODUCT: 6384
OHS CV CPX PEAK SYSTOLIC BLOOD PRESSURE: 114 MMHG
OHS CV CPX PERCENT MAX PREDICTED HEART RATE ACHIEVED: 40
OHS CV CPX RATE PRESSURE PRODUCT PRESENTING: 7866
PERFUSION DEFECT 1 SIZE IN %: 10 %
PISA TR MAX VEL: 3.13 M/S
RA MAJOR: 4.32 CM
RA WIDTH: 3.06 CM
REST FLOW - ANTERIOR: 0.7 CC/MIN/G
REST FLOW - INFERIOR: 0.57 CC/MIN/G
REST FLOW - LATERAL: 0.64 CC/MIN/G
REST FLOW - MAX: 0.89 CC/MIN/G
REST FLOW - MIN: 0.33 CC/MIN/G
REST FLOW - SEPTAL: 0.67 CC/MIN/G
REST FLOW - WHOLE HEART: 0.65 CC/MIN/G
RETIRED EF AND QEF - SEE NOTES: 53 %
RIGHT VENTRICULAR END-DIASTOLIC DIMENSION: 3.05 CM
SINUS: 2.64 CM
STJ: 2.55 CM
STRESS FLOW - ANTERIOR: 0.88 CC/MIN/G
STRESS FLOW - INFERIOR: 0.7 CC/MIN/G
STRESS FLOW - LATERAL: 0.77 CC/MIN/G
STRESS FLOW - MAX: 1.27 CC/MIN/G
STRESS FLOW - MIN: 0.37 CC/MIN/G
STRESS FLOW - SEPTAL: 0.86 CC/MIN/G
STRESS FLOW - WHOLE HEART: 0.8 CC/MIN/G
SYSTOLIC BLOOD PRESSURE: 138 MMHG
TDI LATERAL: 0.08 M/S
TDI SEPTAL: 0.07 M/S
TDI: 0.08 M/S
TR MAX PG: 39 MMHG
TRICUSPID ANNULAR PLANE SYSTOLIC EXCURSION: 2.54 CM

## 2021-11-03 PROCEDURE — 63600175 PHARM REV CODE 636 W HCPCS: Mod: HCNC

## 2021-11-03 PROCEDURE — 93018 CV STRESS TEST I&R ONLY: CPT | Mod: HCNC,,, | Performed by: INTERNAL MEDICINE

## 2021-11-03 PROCEDURE — 93016 CV STRESS TEST SUPVJ ONLY: CPT | Mod: HCNC,,, | Performed by: INTERNAL MEDICINE

## 2021-11-03 PROCEDURE — 78434 AQMBF PET REST & RX STRESS: CPT | Mod: 26,HCNC,, | Performed by: INTERNAL MEDICINE

## 2021-11-03 PROCEDURE — 78434 AQMBF PET REST & RX STRESS: CPT | Mod: HCNC

## 2021-11-03 PROCEDURE — 93018 CARDIAC PET SCAN STRESS (CUPID ONLY): ICD-10-PCS | Mod: HCNC,,, | Performed by: INTERNAL MEDICINE

## 2021-11-03 PROCEDURE — 78431 MYOCRD IMG PET RST&STRS CT: CPT | Mod: 26,HCNC,, | Performed by: INTERNAL MEDICINE

## 2021-11-03 PROCEDURE — 78434 CARDIAC PET SCAN STRESS (CUPID ONLY): ICD-10-PCS | Mod: 26,HCNC,, | Performed by: INTERNAL MEDICINE

## 2021-11-03 PROCEDURE — 78431 CARDIAC PET SCAN STRESS (CUPID ONLY): ICD-10-PCS | Mod: 26,HCNC,, | Performed by: INTERNAL MEDICINE

## 2021-11-03 PROCEDURE — 93016 CARDIAC PET SCAN STRESS (CUPID ONLY): ICD-10-PCS | Mod: HCNC,,, | Performed by: INTERNAL MEDICINE

## 2021-11-03 RX ORDER — DIPYRIDAMOLE 5 MG/ML
48.87 INJECTION INTRAVENOUS ONCE
Status: COMPLETED | OUTPATIENT
Start: 2021-11-03 | End: 2021-11-03

## 2021-11-03 RX ADMIN — DIPYRIDAMOLE 48.85 MG: 5 INJECTION INTRAVENOUS at 09:11

## 2021-11-05 ENCOUNTER — PES CALL (OUTPATIENT)
Dept: ADMINISTRATIVE | Facility: CLINIC | Age: 81
End: 2021-11-05
Payer: MEDICARE

## 2021-11-08 ENCOUNTER — LAB VISIT (OUTPATIENT)
Dept: LAB | Facility: HOSPITAL | Age: 81
End: 2021-11-08
Attending: INTERNAL MEDICINE
Payer: MEDICARE

## 2021-11-08 ENCOUNTER — ANTI-COAG VISIT (OUTPATIENT)
Dept: CARDIOLOGY | Facility: CLINIC | Age: 81
End: 2021-11-08
Payer: MEDICARE

## 2021-11-08 DIAGNOSIS — Z95.2 H/O MECHANICAL AORTIC VALVE REPLACEMENT: ICD-10-CM

## 2021-11-08 DIAGNOSIS — Z79.01 LONG TERM CURRENT USE OF ANTICOAGULANT THERAPY: ICD-10-CM

## 2021-11-08 DIAGNOSIS — Z79.01 LONG TERM CURRENT USE OF ANTICOAGULANT THERAPY: Primary | ICD-10-CM

## 2021-11-08 LAB
INR PPP: 2.1 (ref 0.8–1.2)
PROTHROMBIN TIME: 22 SEC (ref 9–12.5)

## 2021-11-08 PROCEDURE — 93793 ANTICOAG MGMT PT WARFARIN: CPT | Mod: S$GLB,,,

## 2021-11-08 PROCEDURE — 93793 PR ANTICOAGULANT MGMT FOR PT TAKING WARFARIN: ICD-10-PCS | Mod: S$GLB,,,

## 2021-11-08 PROCEDURE — 85610 PROTHROMBIN TIME: CPT | Mod: HCNC | Performed by: INTERNAL MEDICINE

## 2021-11-08 PROCEDURE — 36415 COLL VENOUS BLD VENIPUNCTURE: CPT | Mod: HCNC | Performed by: INTERNAL MEDICINE

## 2021-11-09 ENCOUNTER — TELEPHONE (OUTPATIENT)
Dept: INTERNAL MEDICINE | Facility: CLINIC | Age: 81
End: 2021-11-09
Payer: MEDICARE

## 2021-11-10 ENCOUNTER — IMMUNIZATION (OUTPATIENT)
Dept: INTERNAL MEDICINE | Facility: CLINIC | Age: 81
End: 2021-11-10
Payer: MEDICARE

## 2021-11-10 ENCOUNTER — OFFICE VISIT (OUTPATIENT)
Dept: INTERNAL MEDICINE | Facility: CLINIC | Age: 81
End: 2021-11-10
Payer: MEDICARE

## 2021-11-10 ENCOUNTER — LAB VISIT (OUTPATIENT)
Dept: LAB | Facility: HOSPITAL | Age: 81
End: 2021-11-10
Payer: MEDICARE

## 2021-11-10 VITALS
HEIGHT: 66 IN | OXYGEN SATURATION: 98 % | SYSTOLIC BLOOD PRESSURE: 120 MMHG | BODY MASS INDEX: 31.04 KG/M2 | WEIGHT: 193.13 LBS | HEART RATE: 62 BPM | DIASTOLIC BLOOD PRESSURE: 62 MMHG

## 2021-11-10 DIAGNOSIS — E11.51 TYPE 2 DIABETES MELLITUS WITH DIABETIC PERIPHERAL ANGIOPATHY WITHOUT GANGRENE, WITHOUT LONG-TERM CURRENT USE OF INSULIN: ICD-10-CM

## 2021-11-10 DIAGNOSIS — R26.9 ABNORMALITY OF GAIT AND MOBILITY: ICD-10-CM

## 2021-11-10 DIAGNOSIS — N18.32 TYPE 2 DIABETES MELLITUS WITH STAGE 3B CHRONIC KIDNEY DISEASE, WITHOUT LONG-TERM CURRENT USE OF INSULIN: ICD-10-CM

## 2021-11-10 DIAGNOSIS — Z00.00 ENCOUNTER FOR PREVENTIVE HEALTH EXAMINATION: Primary | ICD-10-CM

## 2021-11-10 DIAGNOSIS — Z79.01 LONG TERM CURRENT USE OF ANTICOAGULANT THERAPY: ICD-10-CM

## 2021-11-10 DIAGNOSIS — D63.1 ANEMIA OF CHRONIC RENAL FAILURE, STAGE 3 (MODERATE), UNSPECIFIED WHETHER STAGE 3A OR 3B CKD: ICD-10-CM

## 2021-11-10 DIAGNOSIS — N18.30 ANEMIA OF CHRONIC RENAL FAILURE, STAGE 3 (MODERATE), UNSPECIFIED WHETHER STAGE 3A OR 3B CKD: ICD-10-CM

## 2021-11-10 DIAGNOSIS — I15.2 OBESITY, DIABETES, AND HYPERTENSION SYNDROME: ICD-10-CM

## 2021-11-10 DIAGNOSIS — I70.219 ATHEROSCLEROSIS OF LOWER EXTREMITY WITH CLAUDICATION: ICD-10-CM

## 2021-11-10 DIAGNOSIS — N18.32 STAGE 3B CHRONIC KIDNEY DISEASE: ICD-10-CM

## 2021-11-10 DIAGNOSIS — D69.6 THROMBOCYTOPENIA: ICD-10-CM

## 2021-11-10 DIAGNOSIS — E11.22 TYPE 2 DIABETES MELLITUS WITH STAGE 3B CHRONIC KIDNEY DISEASE, WITHOUT LONG-TERM CURRENT USE OF INSULIN: ICD-10-CM

## 2021-11-10 DIAGNOSIS — E11.69 OBESITY, DIABETES, AND HYPERTENSION SYNDROME: ICD-10-CM

## 2021-11-10 DIAGNOSIS — N25.81 HYPERPARATHYROIDISM DUE TO RENAL INSUFFICIENCY: ICD-10-CM

## 2021-11-10 DIAGNOSIS — I15.2 HYPERTENSION ASSOCIATED WITH DIABETES: ICD-10-CM

## 2021-11-10 DIAGNOSIS — E11.59 OBESITY, DIABETES, AND HYPERTENSION SYNDROME: ICD-10-CM

## 2021-11-10 DIAGNOSIS — E78.5 HYPERLIPIDEMIA ASSOCIATED WITH TYPE 2 DIABETES MELLITUS: ICD-10-CM

## 2021-11-10 DIAGNOSIS — E11.69 HYPERLIPIDEMIA ASSOCIATED WITH TYPE 2 DIABETES MELLITUS: ICD-10-CM

## 2021-11-10 DIAGNOSIS — E66.9 OBESITY, DIABETES, AND HYPERTENSION SYNDROME: ICD-10-CM

## 2021-11-10 DIAGNOSIS — E11.59 HYPERTENSION ASSOCIATED WITH DIABETES: ICD-10-CM

## 2021-11-10 DIAGNOSIS — I25.708 CORONARY ARTERY DISEASE OF BYPASS GRAFT OF NATIVE HEART WITH STABLE ANGINA PECTORIS: ICD-10-CM

## 2021-11-10 DIAGNOSIS — Z95.2 H/O MECHANICAL AORTIC VALVE REPLACEMENT: ICD-10-CM

## 2021-11-10 DIAGNOSIS — I27.9 PULMONARY HEART DISEASE: ICD-10-CM

## 2021-11-10 DIAGNOSIS — E66.9 OBESITY (BMI 30.0-34.9): ICD-10-CM

## 2021-11-10 DIAGNOSIS — I65.23 BILATERAL CAROTID ARTERY STENOSIS: ICD-10-CM

## 2021-11-10 LAB
ALBUMIN/CREAT UR: 266.7 UG/MG (ref 0–30)
CREAT UR-MCNC: 30 MG/DL (ref 23–375)
MICROALBUMIN UR DL<=1MG/L-MCNC: 80 UG/ML

## 2021-11-10 PROCEDURE — G0439 PR MEDICARE ANNUAL WELLNESS SUBSEQUENT VISIT: ICD-10-PCS | Mod: HCNC,S$GLB,, | Performed by: NURSE PRACTITIONER

## 2021-11-10 PROCEDURE — 1170F FXNL STATUS ASSESSED: CPT | Mod: HCNC,CPTII,S$GLB, | Performed by: NURSE PRACTITIONER

## 2021-11-10 PROCEDURE — 90694 FLU VACCINE - QUADRIVALENT - ADJUVANTED: ICD-10-PCS | Mod: HCNC,S$GLB,, | Performed by: INTERNAL MEDICINE

## 2021-11-10 PROCEDURE — G0008 FLU VACCINE - QUADRIVALENT - ADJUVANTED: ICD-10-PCS | Mod: HCNC,S$GLB,, | Performed by: INTERNAL MEDICINE

## 2021-11-10 PROCEDURE — 1160F PR REVIEW ALL MEDS BY PRESCRIBER/CLIN PHARMACIST DOCUMENTED: ICD-10-PCS | Mod: HCNC,CPTII,S$GLB, | Performed by: NURSE PRACTITIONER

## 2021-11-10 PROCEDURE — 99999 PR PBB SHADOW E&M-EST. PATIENT-LVL III: CPT | Mod: PBBFAC,HCNC,, | Performed by: NURSE PRACTITIONER

## 2021-11-10 PROCEDURE — 3078F PR MOST RECENT DIASTOLIC BLOOD PRESSURE < 80 MM HG: ICD-10-PCS | Mod: HCNC,CPTII,S$GLB, | Performed by: NURSE PRACTITIONER

## 2021-11-10 PROCEDURE — 3074F SYST BP LT 130 MM HG: CPT | Mod: HCNC,CPTII,S$GLB, | Performed by: NURSE PRACTITIONER

## 2021-11-10 PROCEDURE — 3288F FALL RISK ASSESSMENT DOCD: CPT | Mod: HCNC,CPTII,S$GLB, | Performed by: NURSE PRACTITIONER

## 2021-11-10 PROCEDURE — 1101F PR PT FALLS ASSESS DOC 0-1 FALLS W/OUT INJ PAST YR: ICD-10-PCS | Mod: HCNC,CPTII,S$GLB, | Performed by: NURSE PRACTITIONER

## 2021-11-10 PROCEDURE — 82570 ASSAY OF URINE CREATININE: CPT | Mod: HCNC | Performed by: NURSE PRACTITIONER

## 2021-11-10 PROCEDURE — 1126F PR PAIN SEVERITY QUANTIFIED, NO PAIN PRESENT: ICD-10-PCS | Mod: HCNC,CPTII,S$GLB, | Performed by: NURSE PRACTITIONER

## 2021-11-10 PROCEDURE — 3078F DIAST BP <80 MM HG: CPT | Mod: HCNC,CPTII,S$GLB, | Performed by: NURSE PRACTITIONER

## 2021-11-10 PROCEDURE — 1126F AMNT PAIN NOTED NONE PRSNT: CPT | Mod: HCNC,CPTII,S$GLB, | Performed by: NURSE PRACTITIONER

## 2021-11-10 PROCEDURE — G0439 PPPS, SUBSEQ VISIT: HCPCS | Mod: HCNC,S$GLB,, | Performed by: NURSE PRACTITIONER

## 2021-11-10 PROCEDURE — 1159F MED LIST DOCD IN RCRD: CPT | Mod: HCNC,CPTII,S$GLB, | Performed by: NURSE PRACTITIONER

## 2021-11-10 PROCEDURE — 99499 UNLISTED E&M SERVICE: CPT | Mod: HCNC,S$GLB,, | Performed by: NURSE PRACTITIONER

## 2021-11-10 PROCEDURE — 90694 VACC AIIV4 NO PRSRV 0.5ML IM: CPT | Mod: HCNC,S$GLB,, | Performed by: INTERNAL MEDICINE

## 2021-11-10 PROCEDURE — 99499 RISK ADDL DX/OHS AUDIT: ICD-10-PCS | Mod: HCNC,S$GLB,, | Performed by: NURSE PRACTITIONER

## 2021-11-10 PROCEDURE — 1160F RVW MEDS BY RX/DR IN RCRD: CPT | Mod: HCNC,CPTII,S$GLB, | Performed by: NURSE PRACTITIONER

## 2021-11-10 PROCEDURE — 3074F PR MOST RECENT SYSTOLIC BLOOD PRESSURE < 130 MM HG: ICD-10-PCS | Mod: HCNC,CPTII,S$GLB, | Performed by: NURSE PRACTITIONER

## 2021-11-10 PROCEDURE — 1101F PT FALLS ASSESS-DOCD LE1/YR: CPT | Mod: HCNC,CPTII,S$GLB, | Performed by: NURSE PRACTITIONER

## 2021-11-10 PROCEDURE — 1159F PR MEDICATION LIST DOCUMENTED IN MEDICAL RECORD: ICD-10-PCS | Mod: HCNC,CPTII,S$GLB, | Performed by: NURSE PRACTITIONER

## 2021-11-10 PROCEDURE — G0008 ADMIN INFLUENZA VIRUS VAC: HCPCS | Mod: HCNC,S$GLB,, | Performed by: INTERNAL MEDICINE

## 2021-11-10 PROCEDURE — 99999 PR PBB SHADOW E&M-EST. PATIENT-LVL III: ICD-10-PCS | Mod: PBBFAC,HCNC,, | Performed by: NURSE PRACTITIONER

## 2021-11-10 PROCEDURE — 3288F PR FALLS RISK ASSESSMENT DOCUMENTED: ICD-10-PCS | Mod: HCNC,CPTII,S$GLB, | Performed by: NURSE PRACTITIONER

## 2021-11-10 PROCEDURE — 1170F PR FUNCTIONAL STATUS ASSESSED: ICD-10-PCS | Mod: HCNC,CPTII,S$GLB, | Performed by: NURSE PRACTITIONER

## 2021-11-11 DIAGNOSIS — R94.39 POSITIVE CARDIAC STRESS TEST: ICD-10-CM

## 2021-11-11 DIAGNOSIS — I20.89 STABLE ANGINA PECTORIS: Primary | ICD-10-CM

## 2021-11-11 RX ORDER — SODIUM CHLORIDE 9 MG/ML
INJECTION, SOLUTION INTRAVENOUS CONTINUOUS
Status: CANCELLED | OUTPATIENT
Start: 2021-11-11 | End: 2021-11-11

## 2021-11-11 RX ORDER — DIPHENHYDRAMINE HCL 50 MG
50 CAPSULE ORAL ONCE
Status: CANCELLED | OUTPATIENT
Start: 2021-11-11 | End: 2021-11-11

## 2021-11-12 ENCOUNTER — DOCUMENTATION ONLY (OUTPATIENT)
Dept: CARDIOLOGY | Facility: CLINIC | Age: 81
End: 2021-11-12
Payer: MEDICARE

## 2021-11-12 DIAGNOSIS — Z01.818 PRE-OP TESTING: Primary | ICD-10-CM

## 2021-11-12 RX ORDER — CLOPIDOGREL BISULFATE 75 MG/1
75 TABLET ORAL DAILY
Qty: 30 TABLET | Refills: 0 | Status: ON HOLD | OUTPATIENT
Start: 2021-11-12 | End: 2021-11-30 | Stop reason: SDUPTHER

## 2021-11-13 ENCOUNTER — IMMUNIZATION (OUTPATIENT)
Dept: INTERNAL MEDICINE | Facility: CLINIC | Age: 81
End: 2021-11-13
Payer: MEDICARE

## 2021-11-13 DIAGNOSIS — Z23 NEED FOR VACCINATION: Primary | ICD-10-CM

## 2021-11-13 PROCEDURE — 0004A COVID-19, MRNA, LNP-S, PF, 30 MCG/0.3 ML DOSE VACCINE: CPT | Mod: HCNC,CV19,PBBFAC | Performed by: INTERNAL MEDICINE

## 2021-11-23 ENCOUNTER — LAB VISIT (OUTPATIENT)
Dept: LAB | Facility: HOSPITAL | Age: 81
End: 2021-11-23
Attending: INTERNAL MEDICINE
Payer: MEDICARE

## 2021-11-23 ENCOUNTER — ANTI-COAG VISIT (OUTPATIENT)
Dept: CARDIOLOGY | Facility: CLINIC | Age: 81
End: 2021-11-23
Payer: MEDICARE

## 2021-11-23 DIAGNOSIS — Z79.01 LONG TERM CURRENT USE OF ANTICOAGULANT THERAPY: ICD-10-CM

## 2021-11-23 DIAGNOSIS — Z95.2 H/O MECHANICAL AORTIC VALVE REPLACEMENT: ICD-10-CM

## 2021-11-23 DIAGNOSIS — Z79.01 LONG TERM CURRENT USE OF ANTICOAGULANT THERAPY: Primary | ICD-10-CM

## 2021-11-23 LAB
INR PPP: 1.8 (ref 0.8–1.2)
PROTHROMBIN TIME: 19.3 SEC (ref 9–12.5)

## 2021-11-23 PROCEDURE — 36415 COLL VENOUS BLD VENIPUNCTURE: CPT | Mod: HCNC | Performed by: INTERNAL MEDICINE

## 2021-11-23 PROCEDURE — 85610 PROTHROMBIN TIME: CPT | Mod: HCNC | Performed by: INTERNAL MEDICINE

## 2021-11-23 PROCEDURE — 93793 ANTICOAG MGMT PT WARFARIN: CPT | Mod: S$GLB,,,

## 2021-11-23 PROCEDURE — 93793 PR ANTICOAGULANT MGMT FOR PT TAKING WARFARIN: ICD-10-PCS | Mod: S$GLB,,,

## 2021-11-24 RX ORDER — ENOXAPARIN SODIUM 150 MG/ML
INJECTION SUBCUTANEOUS
Qty: 10 EACH | Refills: 1 | Status: SHIPPED | OUTPATIENT
Start: 2021-11-24 | End: 2022-02-03

## 2021-11-24 RX ORDER — ENOXAPARIN SODIUM 150 MG/ML
130 INJECTION SUBCUTANEOUS DAILY
Qty: 10 EACH | Refills: 1 | Status: SHIPPED | OUTPATIENT
Start: 2021-11-24 | End: 2021-11-24 | Stop reason: SDUPTHER

## 2021-11-26 ENCOUNTER — LAB VISIT (OUTPATIENT)
Dept: LAB | Facility: HOSPITAL | Age: 81
End: 2021-11-26
Attending: INTERNAL MEDICINE
Payer: MEDICARE

## 2021-11-26 DIAGNOSIS — N18.32 STAGE 3B CHRONIC KIDNEY DISEASE: ICD-10-CM

## 2021-11-26 DIAGNOSIS — Z01.818 PRE-OP TESTING: ICD-10-CM

## 2021-11-26 DIAGNOSIS — I73.9 CLAUDICATION: ICD-10-CM

## 2021-11-26 DIAGNOSIS — I73.9 PAD (PERIPHERAL ARTERY DISEASE): ICD-10-CM

## 2021-11-26 LAB
ABO + RH BLD: NORMAL
ANION GAP SERPL CALC-SCNC: 8 MMOL/L (ref 8–16)
BASOPHILS # BLD AUTO: 0.04 K/UL (ref 0–0.2)
BASOPHILS NFR BLD: 0.7 % (ref 0–1.9)
BLD GP AB SCN CELLS X3 SERPL QL: NORMAL
BUN SERPL-MCNC: 48 MG/DL (ref 8–23)
CALCIUM SERPL-MCNC: 10.3 MG/DL (ref 8.7–10.5)
CHLORIDE SERPL-SCNC: 106 MMOL/L (ref 95–110)
CO2 SERPL-SCNC: 25 MMOL/L (ref 23–29)
CREAT SERPL-MCNC: 1.7 MG/DL (ref 0.5–1.4)
DIFFERENTIAL METHOD: ABNORMAL
EOSINOPHIL # BLD AUTO: 0.2 K/UL (ref 0–0.5)
EOSINOPHIL NFR BLD: 2.8 % (ref 0–8)
ERYTHROCYTE [DISTWIDTH] IN BLOOD BY AUTOMATED COUNT: 14 % (ref 11.5–14.5)
EST. GFR  (AFRICAN AMERICAN): 43.1 ML/MIN/1.73 M^2
EST. GFR  (NON AFRICAN AMERICAN): 37.3 ML/MIN/1.73 M^2
GLUCOSE SERPL-MCNC: 85 MG/DL (ref 70–110)
HCT VFR BLD AUTO: 31.8 % (ref 40–54)
HGB BLD-MCNC: 10 G/DL (ref 14–18)
IMM GRANULOCYTES # BLD AUTO: 0.01 K/UL (ref 0–0.04)
IMM GRANULOCYTES NFR BLD AUTO: 0.2 % (ref 0–0.5)
LYMPHOCYTES # BLD AUTO: 1.6 K/UL (ref 1–4.8)
LYMPHOCYTES NFR BLD: 29 % (ref 18–48)
MCH RBC QN AUTO: 30.3 PG (ref 27–31)
MCHC RBC AUTO-ENTMCNC: 31.4 G/DL (ref 32–36)
MCV RBC AUTO: 96 FL (ref 82–98)
MONOCYTES # BLD AUTO: 0.6 K/UL (ref 0.3–1)
MONOCYTES NFR BLD: 11.2 % (ref 4–15)
NEUTROPHILS # BLD AUTO: 3 K/UL (ref 1.8–7.7)
NEUTROPHILS NFR BLD: 56.1 % (ref 38–73)
NRBC BLD-RTO: 0 /100 WBC
PLATELET # BLD AUTO: 172 K/UL (ref 150–450)
PMV BLD AUTO: 11.4 FL (ref 9.2–12.9)
POTASSIUM SERPL-SCNC: 5.2 MMOL/L (ref 3.5–5.1)
RBC # BLD AUTO: 3.3 M/UL (ref 4.6–6.2)
SODIUM SERPL-SCNC: 139 MMOL/L (ref 136–145)
WBC # BLD AUTO: 5.34 K/UL (ref 3.9–12.7)

## 2021-11-26 PROCEDURE — 80048 BASIC METABOLIC PNL TOTAL CA: CPT | Mod: HCNC | Performed by: INTERNAL MEDICINE

## 2021-11-26 PROCEDURE — 36415 COLL VENOUS BLD VENIPUNCTURE: CPT | Mod: HCNC | Performed by: INTERNAL MEDICINE

## 2021-11-26 PROCEDURE — 86900 BLOOD TYPING SEROLOGIC ABO: CPT | Mod: HCNC | Performed by: INTERNAL MEDICINE

## 2021-11-26 PROCEDURE — 85025 COMPLETE CBC W/AUTO DIFF WBC: CPT | Mod: HCNC | Performed by: INTERNAL MEDICINE

## 2021-11-30 ENCOUNTER — HOSPITAL ENCOUNTER (OUTPATIENT)
Facility: HOSPITAL | Age: 81
Discharge: HOME | End: 2021-11-30
Attending: INTERNAL MEDICINE | Admitting: INTERNAL MEDICINE
Payer: MEDICARE

## 2021-11-30 VITALS
TEMPERATURE: 98 F | RESPIRATION RATE: 28 BRPM | DIASTOLIC BLOOD PRESSURE: 63 MMHG | BODY MASS INDEX: 31.02 KG/M2 | WEIGHT: 193 LBS | SYSTOLIC BLOOD PRESSURE: 146 MMHG | OXYGEN SATURATION: 99 % | HEIGHT: 66 IN | HEART RATE: 63 BPM

## 2021-11-30 DIAGNOSIS — I25.810 ATHEROSCLEROSIS OF CORONARY ARTERY BYPASS GRAFT OF NATIVE HEART WITHOUT ANGINA PECTORIS: ICD-10-CM

## 2021-11-30 DIAGNOSIS — I20.89 STABLE ANGINA PECTORIS: ICD-10-CM

## 2021-11-30 DIAGNOSIS — N18.32 STAGE 3B CHRONIC KIDNEY DISEASE: ICD-10-CM

## 2021-11-30 DIAGNOSIS — Z98.61 POSTSURGICAL PERCUTANEOUS TRANSLUMINAL CORONARY ANGIOPLASTY STATUS: Primary | ICD-10-CM

## 2021-11-30 DIAGNOSIS — I25.118 ATHEROSCLEROSIS OF NATIVE CORONARY ARTERY OF NATIVE HEART WITH STABLE ANGINA PECTORIS: ICD-10-CM

## 2021-11-30 DIAGNOSIS — I25.10 CAD (CORONARY ARTERY DISEASE): ICD-10-CM

## 2021-11-30 DIAGNOSIS — I25.110 ATHEROSCLEROSIS OF NATIVE CORONARY ARTERY OF NATIVE HEART WITH UNSTABLE ANGINA PECTORIS: Primary | ICD-10-CM

## 2021-11-30 LAB
ABO + RH BLD: NORMAL
ANION GAP SERPL CALC-SCNC: 7 MMOL/L (ref 8–16)
BASOPHILS # BLD AUTO: 0.05 K/UL (ref 0–0.2)
BASOPHILS NFR BLD: 1.1 % (ref 0–1.9)
BLD GP AB SCN CELLS X3 SERPL QL: NORMAL
BUN SERPL-MCNC: 54 MG/DL (ref 8–23)
CALCIUM SERPL-MCNC: 9.9 MG/DL (ref 8.7–10.5)
CHLORIDE SERPL-SCNC: 107 MMOL/L (ref 95–110)
CO2 SERPL-SCNC: 22 MMOL/L (ref 23–29)
CREAT SERPL-MCNC: 1.7 MG/DL (ref 0.5–1.4)
DIFFERENTIAL METHOD: ABNORMAL
EOSINOPHIL # BLD AUTO: 0.1 K/UL (ref 0–0.5)
EOSINOPHIL NFR BLD: 2.8 % (ref 0–8)
ERYTHROCYTE [DISTWIDTH] IN BLOOD BY AUTOMATED COUNT: 13.8 % (ref 11.5–14.5)
EST. GFR  (AFRICAN AMERICAN): 43.1 ML/MIN/1.73 M^2
EST. GFR  (NON AFRICAN AMERICAN): 37.3 ML/MIN/1.73 M^2
GLUCOSE SERPL-MCNC: 72 MG/DL (ref 70–110)
HCT VFR BLD AUTO: 30.5 % (ref 40–54)
HGB BLD-MCNC: 10.3 G/DL (ref 14–18)
IMM GRANULOCYTES # BLD AUTO: 0.01 K/UL (ref 0–0.04)
IMM GRANULOCYTES NFR BLD AUTO: 0.2 % (ref 0–0.5)
INR PPP: 1.2 (ref 0.8–1.2)
LYMPHOCYTES # BLD AUTO: 1.2 K/UL (ref 1–4.8)
LYMPHOCYTES NFR BLD: 26.6 % (ref 18–48)
MCH RBC QN AUTO: 31.3 PG (ref 27–31)
MCHC RBC AUTO-ENTMCNC: 33.8 G/DL (ref 32–36)
MCV RBC AUTO: 93 FL (ref 82–98)
MONOCYTES # BLD AUTO: 0.6 K/UL (ref 0.3–1)
MONOCYTES NFR BLD: 13.3 % (ref 4–15)
NEUTROPHILS # BLD AUTO: 2.6 K/UL (ref 1.8–7.7)
NEUTROPHILS NFR BLD: 56 % (ref 38–73)
NRBC BLD-RTO: 0 /100 WBC
PLATELET # BLD AUTO: 166 K/UL (ref 150–450)
PMV BLD AUTO: 11.2 FL (ref 9.2–12.9)
POCT GLUCOSE: 78 MG/DL (ref 70–110)
POTASSIUM SERPL-SCNC: 4.5 MMOL/L (ref 3.5–5.1)
PROTHROMBIN TIME: 13 SEC (ref 9–12.5)
RBC # BLD AUTO: 3.29 M/UL (ref 4.6–6.2)
SODIUM SERPL-SCNC: 136 MMOL/L (ref 136–145)
WBC # BLD AUTO: 4.66 K/UL (ref 3.9–12.7)

## 2021-11-30 PROCEDURE — 99152 MOD SED SAME PHYS/QHP 5/>YRS: CPT | Mod: HCNC | Performed by: INTERNAL MEDICINE

## 2021-11-30 PROCEDURE — 93010 ELECTROCARDIOGRAM REPORT: CPT | Mod: HCNC,,, | Performed by: INTERNAL MEDICINE

## 2021-11-30 PROCEDURE — 86900 BLOOD TYPING SEROLOGIC ABO: CPT | Mod: HCNC | Performed by: INTERNAL MEDICINE

## 2021-11-30 PROCEDURE — 93454 CORONARY ARTERY ANGIO S&I: CPT | Mod: 59,HCNC | Performed by: INTERNAL MEDICINE

## 2021-11-30 PROCEDURE — C9600 PERC DRUG-EL COR STENT SING: HCPCS | Mod: LC,HCNC | Performed by: INTERNAL MEDICINE

## 2021-11-30 PROCEDURE — C1725 CATH, TRANSLUMIN NON-LASER: HCPCS | Mod: HCNC | Performed by: INTERNAL MEDICINE

## 2021-11-30 PROCEDURE — C1894 INTRO/SHEATH, NON-LASER: HCPCS | Mod: HCNC | Performed by: INTERNAL MEDICINE

## 2021-11-30 PROCEDURE — 93005 ELECTROCARDIOGRAM TRACING: CPT | Mod: HCNC

## 2021-11-30 PROCEDURE — 85610 PROTHROMBIN TIME: CPT | Mod: HCNC | Performed by: INTERNAL MEDICINE

## 2021-11-30 PROCEDURE — 99153 MOD SED SAME PHYS/QHP EA: CPT | Mod: HCNC | Performed by: INTERNAL MEDICINE

## 2021-11-30 PROCEDURE — 82962 GLUCOSE BLOOD TEST: CPT | Mod: HCNC | Performed by: INTERNAL MEDICINE

## 2021-11-30 PROCEDURE — 99152 PR MOD CONSCIOUS SEDATION, SAME PHYS, 5+ YRS, FIRST 15 MIN: ICD-10-PCS | Mod: HCNC,,, | Performed by: INTERNAL MEDICINE

## 2021-11-30 PROCEDURE — C1887 CATHETER, GUIDING: HCPCS | Mod: HCNC | Performed by: INTERNAL MEDICINE

## 2021-11-30 PROCEDURE — C1874 STENT, COATED/COV W/DEL SYS: HCPCS | Mod: HCNC | Performed by: INTERNAL MEDICINE

## 2021-11-30 PROCEDURE — 85347 COAGULATION TIME ACTIVATED: CPT | Mod: HCNC | Performed by: INTERNAL MEDICINE

## 2021-11-30 PROCEDURE — 93010 EKG 12-LEAD: ICD-10-PCS | Mod: HCNC,,, | Performed by: INTERNAL MEDICINE

## 2021-11-30 PROCEDURE — C1753 CATH, INTRAVAS ULTRASOUND: HCPCS | Mod: HCNC | Performed by: INTERNAL MEDICINE

## 2021-11-30 PROCEDURE — 27201423 OPTIME MED/SURG SUP & DEVICES STERILE SUPPLY: Mod: HCNC | Performed by: INTERNAL MEDICINE

## 2021-11-30 PROCEDURE — 93454 PR CATH PLACE/CORONARY ANGIO, IMG SUPER/INTERP: ICD-10-PCS | Mod: 26,51,59,HCNC | Performed by: INTERNAL MEDICINE

## 2021-11-30 PROCEDURE — 25000003 PHARM REV CODE 250: Mod: HCNC | Performed by: INTERNAL MEDICINE

## 2021-11-30 PROCEDURE — 92928 PRQ TCAT PLMT NTRAC ST 1 LES: CPT | Mod: LC,HCNC,, | Performed by: INTERNAL MEDICINE

## 2021-11-30 PROCEDURE — 80048 BASIC METABOLIC PNL TOTAL CA: CPT | Mod: HCNC | Performed by: INTERNAL MEDICINE

## 2021-11-30 PROCEDURE — C1769 GUIDE WIRE: HCPCS | Mod: HCNC | Performed by: INTERNAL MEDICINE

## 2021-11-30 PROCEDURE — 85025 COMPLETE CBC W/AUTO DIFF WBC: CPT | Mod: HCNC | Performed by: INTERNAL MEDICINE

## 2021-11-30 PROCEDURE — 93454 CORONARY ARTERY ANGIO S&I: CPT | Mod: 26,51,59,HCNC | Performed by: INTERNAL MEDICINE

## 2021-11-30 PROCEDURE — 92978 PR IVUS, CORONARY, 1ST VESSEL: ICD-10-PCS | Mod: 26,LC,HCNC, | Performed by: INTERNAL MEDICINE

## 2021-11-30 PROCEDURE — 92978 ENDOLUMINL IVUS OCT C 1ST: CPT | Mod: LC,HCNC | Performed by: INTERNAL MEDICINE

## 2021-11-30 PROCEDURE — 25500020 PHARM REV CODE 255: Mod: HCNC | Performed by: INTERNAL MEDICINE

## 2021-11-30 PROCEDURE — 92928 PR STENT: ICD-10-PCS | Mod: LC,HCNC,, | Performed by: INTERNAL MEDICINE

## 2021-11-30 PROCEDURE — 92978 ENDOLUMINL IVUS OCT C 1ST: CPT | Mod: 26,LC,HCNC, | Performed by: INTERNAL MEDICINE

## 2021-11-30 PROCEDURE — 63600175 PHARM REV CODE 636 W HCPCS: Mod: HCNC | Performed by: INTERNAL MEDICINE

## 2021-11-30 PROCEDURE — 99152 MOD SED SAME PHYS/QHP 5/>YRS: CPT | Mod: HCNC,,, | Performed by: INTERNAL MEDICINE

## 2021-11-30 DEVICE — STENT RONYX25012UX RESOLUTE ONYX 2.50X12
Type: IMPLANTABLE DEVICE | Site: CORONARY | Status: FUNCTIONAL
Brand: RESOLUTE ONYX™

## 2021-11-30 DEVICE — STENT RONYX30012UX RESOLUTE ONYX 3.00X12
Type: IMPLANTABLE DEVICE | Site: CORONARY | Status: FUNCTIONAL
Brand: RESOLUTE ONYX™

## 2021-11-30 RX ORDER — FENTANYL CITRATE 50 UG/ML
INJECTION, SOLUTION INTRAMUSCULAR; INTRAVENOUS
Status: DISCONTINUED | OUTPATIENT
Start: 2021-11-30 | End: 2021-11-30 | Stop reason: HOSPADM

## 2021-11-30 RX ORDER — ONDANSETRON 8 MG/1
8 TABLET, ORALLY DISINTEGRATING ORAL EVERY 8 HOURS PRN
Status: DISCONTINUED | OUTPATIENT
Start: 2021-11-30 | End: 2021-11-30 | Stop reason: HOSPADM

## 2021-11-30 RX ORDER — ACETAMINOPHEN 325 MG/1
650 TABLET ORAL EVERY 4 HOURS PRN
Status: DISCONTINUED | OUTPATIENT
Start: 2021-11-30 | End: 2021-11-30 | Stop reason: HOSPADM

## 2021-11-30 RX ORDER — MIDAZOLAM HYDROCHLORIDE 1 MG/ML
INJECTION, SOLUTION INTRAMUSCULAR; INTRAVENOUS
Status: DISCONTINUED | OUTPATIENT
Start: 2021-11-30 | End: 2021-11-30 | Stop reason: HOSPADM

## 2021-11-30 RX ORDER — LIDOCAINE HYDROCHLORIDE 20 MG/ML
INJECTION, SOLUTION EPIDURAL; INFILTRATION; INTRACAUDAL; PERINEURAL
Status: DISCONTINUED | OUTPATIENT
Start: 2021-11-30 | End: 2021-11-30 | Stop reason: HOSPADM

## 2021-11-30 RX ORDER — DIPHENHYDRAMINE HCL 50 MG
50 CAPSULE ORAL ONCE
Status: COMPLETED | OUTPATIENT
Start: 2021-11-30 | End: 2021-11-30

## 2021-11-30 RX ORDER — ASPIRIN 81 MG/1
81 TABLET ORAL DAILY
Refills: 0 | Status: ON HOLD | COMMUNITY
Start: 2021-11-30 | End: 2022-02-04 | Stop reason: HOSPADM

## 2021-11-30 RX ORDER — HEPARIN SODIUM 1000 [USP'U]/ML
INJECTION, SOLUTION INTRAVENOUS; SUBCUTANEOUS
Status: DISCONTINUED | OUTPATIENT
Start: 2021-11-30 | End: 2021-11-30 | Stop reason: HOSPADM

## 2021-11-30 RX ORDER — SODIUM CHLORIDE 9 MG/ML
INJECTION, SOLUTION INTRAVENOUS CONTINUOUS
Status: ACTIVE | OUTPATIENT
Start: 2021-11-30 | End: 2021-11-30

## 2021-11-30 RX ORDER — SODIUM CHLORIDE 9 MG/ML
INJECTION, SOLUTION INTRAVENOUS CONTINUOUS
Status: DISCONTINUED | OUTPATIENT
Start: 2021-11-30 | End: 2021-11-30 | Stop reason: HOSPADM

## 2021-11-30 RX ORDER — CLOPIDOGREL BISULFATE 75 MG/1
75 TABLET ORAL DAILY
Qty: 30 TABLET | Refills: 11 | Status: SHIPPED | OUTPATIENT
Start: 2021-11-30 | End: 2022-11-10

## 2021-11-30 RX ORDER — HEPARIN SOD,PORCINE/0.9 % NACL 1000/500ML
INTRAVENOUS SOLUTION INTRAVENOUS
Status: DISCONTINUED | OUTPATIENT
Start: 2021-11-30 | End: 2021-11-30 | Stop reason: HOSPADM

## 2021-11-30 RX ADMIN — SODIUM CHLORIDE: 0.9 INJECTION, SOLUTION INTRAVENOUS at 06:11

## 2021-11-30 RX ADMIN — DIPHENHYDRAMINE HYDROCHLORIDE 50 MG: 50 CAPSULE ORAL at 06:11

## 2021-12-01 LAB
POC ACTIVATED CLOTTING TIME K: 225 SEC (ref 74–137)
POC ACTIVATED CLOTTING TIME K: 297 SEC (ref 74–137)
SAMPLE: ABNORMAL
SAMPLE: ABNORMAL

## 2021-12-03 ENCOUNTER — LAB VISIT (OUTPATIENT)
Dept: LAB | Facility: HOSPITAL | Age: 81
End: 2021-12-03
Attending: INTERNAL MEDICINE
Payer: MEDICARE

## 2021-12-03 ENCOUNTER — ANTI-COAG VISIT (OUTPATIENT)
Dept: CARDIOLOGY | Facility: CLINIC | Age: 81
End: 2021-12-03
Payer: MEDICARE

## 2021-12-03 DIAGNOSIS — Z95.2 H/O MECHANICAL AORTIC VALVE REPLACEMENT: ICD-10-CM

## 2021-12-03 DIAGNOSIS — Z79.01 LONG TERM CURRENT USE OF ANTICOAGULANT THERAPY: ICD-10-CM

## 2021-12-03 DIAGNOSIS — Z79.01 LONG TERM CURRENT USE OF ANTICOAGULANT THERAPY: Primary | ICD-10-CM

## 2021-12-03 LAB
INR PPP: 1.4 (ref 0.8–1.2)
PROTHROMBIN TIME: 15.1 SEC (ref 9–12.5)

## 2021-12-03 PROCEDURE — 85610 PROTHROMBIN TIME: CPT | Mod: HCNC | Performed by: INTERNAL MEDICINE

## 2021-12-03 PROCEDURE — 99211 PR OFFICE/OUTPT VISIT, EST, LEVL I: ICD-10-PCS | Mod: S$GLB,,, | Performed by: INTERNAL MEDICINE

## 2021-12-03 PROCEDURE — 99211 OFF/OP EST MAY X REQ PHY/QHP: CPT | Mod: S$GLB,,, | Performed by: INTERNAL MEDICINE

## 2021-12-03 PROCEDURE — 36415 COLL VENOUS BLD VENIPUNCTURE: CPT | Mod: HCNC | Performed by: INTERNAL MEDICINE

## 2021-12-07 ENCOUNTER — ANTI-COAG VISIT (OUTPATIENT)
Dept: CARDIOLOGY | Facility: CLINIC | Age: 81
End: 2021-12-07
Payer: MEDICARE

## 2021-12-07 ENCOUNTER — LAB VISIT (OUTPATIENT)
Dept: LAB | Facility: HOSPITAL | Age: 81
End: 2021-12-07
Attending: INTERNAL MEDICINE
Payer: MEDICARE

## 2021-12-07 DIAGNOSIS — Z95.2 H/O MECHANICAL AORTIC VALVE REPLACEMENT: ICD-10-CM

## 2021-12-07 DIAGNOSIS — Z79.01 LONG TERM CURRENT USE OF ANTICOAGULANT THERAPY: ICD-10-CM

## 2021-12-07 DIAGNOSIS — Z79.01 LONG TERM CURRENT USE OF ANTICOAGULANT THERAPY: Primary | ICD-10-CM

## 2021-12-07 LAB
INR PPP: 2 (ref 0.8–1.2)
PROTHROMBIN TIME: 20.9 SEC (ref 9–12.5)

## 2021-12-07 PROCEDURE — 85610 PROTHROMBIN TIME: CPT | Mod: HCNC | Performed by: INTERNAL MEDICINE

## 2021-12-07 PROCEDURE — 99211 PR OFFICE/OUTPT VISIT, EST, LEVL I: ICD-10-PCS | Mod: S$GLB,,, | Performed by: INTERNAL MEDICINE

## 2021-12-07 PROCEDURE — 36415 COLL VENOUS BLD VENIPUNCTURE: CPT | Mod: HCNC | Performed by: INTERNAL MEDICINE

## 2021-12-07 PROCEDURE — 99211 OFF/OP EST MAY X REQ PHY/QHP: CPT | Mod: S$GLB,,, | Performed by: INTERNAL MEDICINE

## 2021-12-14 PROBLEM — E66.01 MORBID OBESITY: Chronic | Status: ACTIVE | Noted: 2019-07-24

## 2021-12-15 ENCOUNTER — TELEPHONE (OUTPATIENT)
Dept: CARDIAC REHAB | Facility: CLINIC | Age: 81
End: 2021-12-15
Payer: MEDICARE

## 2021-12-15 ENCOUNTER — OFFICE VISIT (OUTPATIENT)
Dept: CARDIOLOGY | Facility: CLINIC | Age: 81
End: 2021-12-15
Payer: MEDICARE

## 2021-12-15 VITALS
BODY MASS INDEX: 30.79 KG/M2 | DIASTOLIC BLOOD PRESSURE: 55 MMHG | SYSTOLIC BLOOD PRESSURE: 123 MMHG | OXYGEN SATURATION: 98 % | WEIGHT: 191.56 LBS | HEIGHT: 66 IN | HEART RATE: 60 BPM

## 2021-12-15 DIAGNOSIS — E66.01 MORBID OBESITY: Chronic | ICD-10-CM

## 2021-12-15 DIAGNOSIS — I15.0 RENOVASCULAR HYPERTENSION: ICD-10-CM

## 2021-12-15 DIAGNOSIS — I25.708 CORONARY ARTERY DISEASE OF BYPASS GRAFT OF NATIVE HEART WITH STABLE ANGINA PECTORIS: Primary | ICD-10-CM

## 2021-12-15 DIAGNOSIS — N18.30 ANEMIA OF CHRONIC RENAL FAILURE, STAGE 3 (MODERATE), UNSPECIFIED WHETHER STAGE 3A OR 3B CKD: ICD-10-CM

## 2021-12-15 DIAGNOSIS — I65.23 BILATERAL CAROTID ARTERY STENOSIS: ICD-10-CM

## 2021-12-15 DIAGNOSIS — E11.59 HYPERTENSION ASSOCIATED WITH DIABETES: ICD-10-CM

## 2021-12-15 DIAGNOSIS — D69.6 THROMBOCYTOPENIA: ICD-10-CM

## 2021-12-15 DIAGNOSIS — D63.1 ANEMIA OF CHRONIC RENAL FAILURE, STAGE 3 (MODERATE), UNSPECIFIED WHETHER STAGE 3A OR 3B CKD: ICD-10-CM

## 2021-12-15 DIAGNOSIS — I25.110 ATHEROSCLEROSIS OF NATIVE CORONARY ARTERY OF NATIVE HEART WITH UNSTABLE ANGINA PECTORIS: ICD-10-CM

## 2021-12-15 DIAGNOSIS — I70.219 ATHEROSCLEROSIS OF LOWER EXTREMITY WITH CLAUDICATION: ICD-10-CM

## 2021-12-15 DIAGNOSIS — I15.2 HYPERTENSION ASSOCIATED WITH DIABETES: ICD-10-CM

## 2021-12-15 DIAGNOSIS — E78.5 HYPERLIPIDEMIA ASSOCIATED WITH TYPE 2 DIABETES MELLITUS: ICD-10-CM

## 2021-12-15 DIAGNOSIS — E11.69 HYPERLIPIDEMIA ASSOCIATED WITH TYPE 2 DIABETES MELLITUS: ICD-10-CM

## 2021-12-15 DIAGNOSIS — Z95.2 H/O MECHANICAL AORTIC VALVE REPLACEMENT: ICD-10-CM

## 2021-12-15 DIAGNOSIS — N18.31 STAGE 3A CHRONIC KIDNEY DISEASE: ICD-10-CM

## 2021-12-15 PROCEDURE — 99999 PR PBB SHADOW E&M-EST. PATIENT-LVL III: ICD-10-PCS | Mod: PBBFAC,HCNC,, | Performed by: INTERNAL MEDICINE

## 2021-12-15 PROCEDURE — 99214 PR OFFICE/OUTPT VISIT, EST, LEVL IV, 30-39 MIN: ICD-10-PCS | Mod: HCNC,S$GLB,, | Performed by: INTERNAL MEDICINE

## 2021-12-15 PROCEDURE — 99214 OFFICE O/P EST MOD 30 MIN: CPT | Mod: HCNC,S$GLB,, | Performed by: INTERNAL MEDICINE

## 2021-12-15 PROCEDURE — 99999 PR PBB SHADOW E&M-EST. PATIENT-LVL III: CPT | Mod: PBBFAC,HCNC,, | Performed by: INTERNAL MEDICINE

## 2021-12-20 DIAGNOSIS — I15.0 RENOVASCULAR HYPERTENSION: ICD-10-CM

## 2021-12-21 ENCOUNTER — OFFICE VISIT (OUTPATIENT)
Dept: OTOLARYNGOLOGY | Facility: CLINIC | Age: 81
End: 2021-12-21
Payer: MEDICARE

## 2021-12-21 ENCOUNTER — CLINICAL SUPPORT (OUTPATIENT)
Dept: AUDIOLOGY | Facility: CLINIC | Age: 81
End: 2021-12-21
Payer: MEDICARE

## 2021-12-21 ENCOUNTER — LAB VISIT (OUTPATIENT)
Dept: LAB | Facility: HOSPITAL | Age: 81
End: 2021-12-21
Attending: INTERNAL MEDICINE
Payer: MEDICARE

## 2021-12-21 ENCOUNTER — ANTI-COAG VISIT (OUTPATIENT)
Dept: CARDIOLOGY | Facility: CLINIC | Age: 81
End: 2021-12-21
Payer: MEDICARE

## 2021-12-21 VITALS — DIASTOLIC BLOOD PRESSURE: 61 MMHG | SYSTOLIC BLOOD PRESSURE: 121 MMHG | HEART RATE: 54 BPM

## 2021-12-21 DIAGNOSIS — Z95.2 H/O MECHANICAL AORTIC VALVE REPLACEMENT: ICD-10-CM

## 2021-12-21 DIAGNOSIS — I25.708 CORONARY ARTERY DISEASE OF BYPASS GRAFT OF NATIVE HEART WITH STABLE ANGINA PECTORIS: ICD-10-CM

## 2021-12-21 DIAGNOSIS — H90.3 SENSORINEURAL HEARING LOSS, BILATERAL: Primary | ICD-10-CM

## 2021-12-21 DIAGNOSIS — H90.3 SENSORINEURAL HEARING LOSS (SNHL) OF BOTH EARS: Primary | ICD-10-CM

## 2021-12-21 DIAGNOSIS — Z79.01 LONG TERM CURRENT USE OF ANTICOAGULANT THERAPY: Primary | ICD-10-CM

## 2021-12-21 DIAGNOSIS — H61.21 RIGHT EAR IMPACTED CERUMEN: ICD-10-CM

## 2021-12-21 LAB
ANION GAP SERPL CALC-SCNC: 7 MMOL/L (ref 8–16)
BUN SERPL-MCNC: 78 MG/DL (ref 8–23)
CALCIUM SERPL-MCNC: 10.3 MG/DL (ref 8.7–10.5)
CHLORIDE SERPL-SCNC: 109 MMOL/L (ref 95–110)
CO2 SERPL-SCNC: 22 MMOL/L (ref 23–29)
CREAT SERPL-MCNC: 2.4 MG/DL (ref 0.5–1.4)
EST. GFR  (AFRICAN AMERICAN): 28.4 ML/MIN/1.73 M^2
EST. GFR  (NON AFRICAN AMERICAN): 24.6 ML/MIN/1.73 M^2
GLUCOSE SERPL-MCNC: 83 MG/DL (ref 70–110)
POTASSIUM SERPL-SCNC: 5.8 MMOL/L (ref 3.5–5.1)
SODIUM SERPL-SCNC: 138 MMOL/L (ref 136–145)

## 2021-12-21 PROCEDURE — 99203 PR OFFICE/OUTPT VISIT, NEW, LEVL III, 30-44 MIN: ICD-10-PCS | Mod: 25,HCNC,S$GLB, | Performed by: NURSE PRACTITIONER

## 2021-12-21 PROCEDURE — 69210 REMOVE IMPACTED EAR WAX UNI: CPT | Mod: HCNC,S$GLB,, | Performed by: NURSE PRACTITIONER

## 2021-12-21 PROCEDURE — 36415 COLL VENOUS BLD VENIPUNCTURE: CPT | Mod: HCNC | Performed by: INTERNAL MEDICINE

## 2021-12-21 PROCEDURE — 99999 PR PBB SHADOW E&M-EST. PATIENT-LVL I: ICD-10-PCS | Mod: PBBFAC,HCNC,, | Performed by: AUDIOLOGIST

## 2021-12-21 PROCEDURE — 80048 BASIC METABOLIC PNL TOTAL CA: CPT | Mod: HCNC | Performed by: INTERNAL MEDICINE

## 2021-12-21 PROCEDURE — 69210 EAR CERUMEN REMOVAL: ICD-10-PCS | Mod: HCNC,S$GLB,, | Performed by: NURSE PRACTITIONER

## 2021-12-21 PROCEDURE — 99211 OFF/OP EST MAY X REQ PHY/QHP: CPT | Mod: S$GLB,,, | Performed by: INTERNAL MEDICINE

## 2021-12-21 PROCEDURE — 92557 PR COMPREHENSIVE HEARING TEST: ICD-10-PCS | Mod: HCNC,S$GLB,, | Performed by: AUDIOLOGIST

## 2021-12-21 PROCEDURE — 99999 PR PBB SHADOW E&M-EST. PATIENT-LVL III: ICD-10-PCS | Mod: PBBFAC,HCNC,, | Performed by: NURSE PRACTITIONER

## 2021-12-21 PROCEDURE — 92567 TYMPANOMETRY: CPT | Mod: HCNC,S$GLB,, | Performed by: AUDIOLOGIST

## 2021-12-21 PROCEDURE — 99999 PR PBB SHADOW E&M-EST. PATIENT-LVL III: CPT | Mod: PBBFAC,HCNC,, | Performed by: NURSE PRACTITIONER

## 2021-12-21 PROCEDURE — 99211 PR OFFICE/OUTPT VISIT, EST, LEVL I: ICD-10-PCS | Mod: S$GLB,,, | Performed by: INTERNAL MEDICINE

## 2021-12-21 PROCEDURE — 99203 OFFICE O/P NEW LOW 30 MIN: CPT | Mod: 25,HCNC,S$GLB, | Performed by: NURSE PRACTITIONER

## 2021-12-21 PROCEDURE — 92557 COMPREHENSIVE HEARING TEST: CPT | Mod: HCNC,S$GLB,, | Performed by: AUDIOLOGIST

## 2021-12-21 PROCEDURE — 92567 PR TYMPA2METRY: ICD-10-PCS | Mod: HCNC,S$GLB,, | Performed by: AUDIOLOGIST

## 2021-12-21 PROCEDURE — 99999 PR PBB SHADOW E&M-EST. PATIENT-LVL I: CPT | Mod: PBBFAC,HCNC,, | Performed by: AUDIOLOGIST

## 2021-12-21 RX ORDER — NIFEDIPINE 60 MG/1
60 TABLET, EXTENDED RELEASE ORAL DAILY
Qty: 90 TABLET | Refills: 4 | Status: ON HOLD | OUTPATIENT
Start: 2021-12-21 | End: 2022-10-01 | Stop reason: SDUPTHER

## 2022-01-06 ENCOUNTER — ANTI-COAG VISIT (OUTPATIENT)
Dept: CARDIOLOGY | Facility: CLINIC | Age: 82
End: 2022-01-06
Payer: MEDICARE

## 2022-01-06 ENCOUNTER — LAB VISIT (OUTPATIENT)
Dept: LAB | Facility: HOSPITAL | Age: 82
End: 2022-01-06
Attending: INTERNAL MEDICINE
Payer: MEDICARE

## 2022-01-06 DIAGNOSIS — Z79.01 LONG TERM CURRENT USE OF ANTICOAGULANT THERAPY: Primary | ICD-10-CM

## 2022-01-06 DIAGNOSIS — Z95.2 H/O MECHANICAL AORTIC VALVE REPLACEMENT: ICD-10-CM

## 2022-01-06 DIAGNOSIS — Z79.01 LONG TERM CURRENT USE OF ANTICOAGULANT THERAPY: ICD-10-CM

## 2022-01-06 LAB
INR PPP: 2.4 (ref 0.8–1.2)
PROTHROMBIN TIME: 25.1 SEC (ref 9–12.5)

## 2022-01-06 PROCEDURE — 99211 OFF/OP EST MAY X REQ PHY/QHP: CPT | Mod: S$GLB,,, | Performed by: INTERNAL MEDICINE

## 2022-01-06 PROCEDURE — 36415 COLL VENOUS BLD VENIPUNCTURE: CPT | Mod: HCNC | Performed by: INTERNAL MEDICINE

## 2022-01-06 PROCEDURE — 85610 PROTHROMBIN TIME: CPT | Mod: HCNC | Performed by: INTERNAL MEDICINE

## 2022-01-06 PROCEDURE — 99211 PR OFFICE/OUTPT VISIT, EST, LEVL I: ICD-10-PCS | Mod: S$GLB,,, | Performed by: INTERNAL MEDICINE

## 2022-01-06 NOTE — PROGRESS NOTES
INR at goal. Medications and chart reviewed. No changes noted to necessitate adjustment of warfarin or follow-up plan. See calendar.         Left knee TKR failure

## 2022-01-19 NOTE — TELEPHONE ENCOUNTER
Called the patient to inform of receiving approval from Coumadin clinic to hold Coumadin. Spoke to wife Ameena.  Scheduled for 8/13/18 at 9:30 am with Dr Barba on the 2nd floor.  Went over prep instructions and informed to hold Coumadin 5 days prior to procedure and will receive further instructions from Coumadin clinic.  Verbally stated understanding.  Instructions mailed to address in chart.  No further questions at this time.     Unable to assess due to patient's cognitive impairment

## 2022-01-20 ENCOUNTER — LAB VISIT (OUTPATIENT)
Dept: LAB | Facility: HOSPITAL | Age: 82
End: 2022-01-20
Attending: INTERNAL MEDICINE
Payer: MEDICARE

## 2022-01-20 ENCOUNTER — ANTI-COAG VISIT (OUTPATIENT)
Dept: CARDIOLOGY | Facility: CLINIC | Age: 82
End: 2022-01-20
Payer: MEDICARE

## 2022-01-20 DIAGNOSIS — Z79.01 LONG TERM CURRENT USE OF ANTICOAGULANT THERAPY: ICD-10-CM

## 2022-01-20 DIAGNOSIS — Z79.01 LONG TERM CURRENT USE OF ANTICOAGULANT THERAPY: Primary | ICD-10-CM

## 2022-01-20 DIAGNOSIS — Z95.2 H/O MECHANICAL AORTIC VALVE REPLACEMENT: ICD-10-CM

## 2022-01-20 LAB
INR PPP: 2.6 (ref 0.8–1.2)
PROTHROMBIN TIME: 27.1 SEC (ref 9–12.5)

## 2022-01-20 PROCEDURE — 99211 OFF/OP EST MAY X REQ PHY/QHP: CPT | Mod: S$GLB,,, | Performed by: INTERNAL MEDICINE

## 2022-01-20 PROCEDURE — 99211 PR OFFICE/OUTPT VISIT, EST, LEVL I: ICD-10-PCS | Mod: S$GLB,,, | Performed by: INTERNAL MEDICINE

## 2022-01-20 PROCEDURE — 85610 PROTHROMBIN TIME: CPT | Mod: HCNC | Performed by: INTERNAL MEDICINE

## 2022-01-20 PROCEDURE — 36415 COLL VENOUS BLD VENIPUNCTURE: CPT | Mod: HCNC | Performed by: INTERNAL MEDICINE

## 2022-01-25 DIAGNOSIS — I73.9 PVD (PERIPHERAL VASCULAR DISEASE): Primary | ICD-10-CM

## 2022-01-25 DIAGNOSIS — N18.9 CHRONIC KIDNEY DISEASE, UNSPECIFIED CKD STAGE: ICD-10-CM

## 2022-01-25 DIAGNOSIS — R74.9 ABNORMAL SERUM ENZYME LEVEL, UNSPECIFIED: ICD-10-CM

## 2022-01-25 DIAGNOSIS — I73.9 PVD (PERIPHERAL VASCULAR DISEASE): ICD-10-CM

## 2022-01-25 RX ORDER — SODIUM CHLORIDE 9 MG/ML
INJECTION, SOLUTION INTRAVENOUS CONTINUOUS
Status: CANCELLED | OUTPATIENT
Start: 2022-01-25 | End: 2022-01-25

## 2022-01-25 RX ORDER — DIPHENHYDRAMINE HCL 50 MG
50 CAPSULE ORAL ONCE
Status: CANCELLED | OUTPATIENT
Start: 2022-01-25 | End: 2022-01-25

## 2022-01-26 ENCOUNTER — TELEPHONE (OUTPATIENT)
Dept: INTERNAL MEDICINE | Facility: CLINIC | Age: 82
End: 2022-01-26
Payer: MEDICARE

## 2022-01-26 ENCOUNTER — CLINICAL SUPPORT (OUTPATIENT)
Dept: INTERNAL MEDICINE | Facility: CLINIC | Age: 82
End: 2022-01-26
Payer: MEDICARE

## 2022-01-26 DIAGNOSIS — R19.7 DIARRHEA: ICD-10-CM

## 2022-01-26 DIAGNOSIS — R50.9 FEVER: ICD-10-CM

## 2022-01-26 DIAGNOSIS — R05.9 COUGH: ICD-10-CM

## 2022-01-26 LAB
SARS-COV-2 RNA RESP QL NAA+PROBE: DETECTED
SARS-COV-2- CYCLE NUMBER: 38

## 2022-01-26 PROCEDURE — U0005 INFEC AGEN DETEC AMPLI PROBE: HCPCS | Performed by: INTERNAL MEDICINE

## 2022-01-26 PROCEDURE — U0003 INFECTIOUS AGENT DETECTION BY NUCLEIC ACID (DNA OR RNA); SEVERE ACUTE RESPIRATORY SYNDROME CORONAVIRUS 2 (SARS-COV-2) (CORONAVIRUS DISEASE [COVID-19]), AMPLIFIED PROBE TECHNIQUE, MAKING USE OF HIGH THROUGHPUT TECHNOLOGIES AS DESCRIBED BY CMS-2020-01-R: HCPCS | Mod: HCNC | Performed by: INTERNAL MEDICINE

## 2022-01-26 NOTE — TELEPHONE ENCOUNTER
Spoke to patient,s wife and advised of recommendation and recommendation for medication . Patient's wife verbalized understanding.

## 2022-01-26 NOTE — TELEPHONE ENCOUNTER
Needs to be tested fr covid-- order in     He can take some peptobismol for the diarrhea -  He can take some tylenol for the fever and muccinex DM or Guaifenesin for the cough

## 2022-01-26 NOTE — TELEPHONE ENCOUNTER
Spoke to wife stated that patient has diarrhea, sore  throat, and fever.     Symptoms have been going on for two days.     Asked with has patient been tested for covid, she stated no.    Please advise...

## 2022-01-26 NOTE — TELEPHONE ENCOUNTER
----- Message from Nelida Man sent at 1/26/2022  7:06 AM CST -----  Contact: Spouse/Ameena [phone]  Would like to get medical advice  Symptoms: fever/diarrhea  How long has patient had these symptoms: 2 days  Would the patient like a call back, or a response through their [facility] portal?: call  Pharmacy name and phone #:   [facility]  [address] [facility] [address]  Phone: [phone] Fax: [phone]    Comments:    Please call and advise.    Thank you

## 2022-01-26 NOTE — TELEPHONE ENCOUNTER
----- Message from Nelida Man sent at 1/26/2022  7:06 AM CST -----  Contact: Spouse/Ameena [phone]  Would like to get medical advice  Symptoms: fever/diarrhea  How long has patient had these symptoms: 2 days  Would the patient like a call back, or a response through their [facility] portal?: call  Pharmacy name and phone #:   [facility]  [address] [facility] [address]  Phone: [phone] Fax: [phone]    Comments:    Please call and advise.    Thank you

## 2022-01-27 ENCOUNTER — TELEPHONE (OUTPATIENT)
Dept: INTERNAL MEDICINE | Facility: CLINIC | Age: 82
End: 2022-01-27
Payer: MEDICARE

## 2022-01-27 DIAGNOSIS — U07.1 COVID-19 VIRUS DETECTED: ICD-10-CM

## 2022-01-27 NOTE — TELEPHONE ENCOUNTER
Pt tested positive for covid. Pt wife would like to know if it is ok for him to take imodium for diarrhea    DChris LLAMAS.

## 2022-01-31 ENCOUNTER — HOSPITAL ENCOUNTER (EMERGENCY)
Facility: HOSPITAL | Age: 82
Discharge: HOME | End: 2022-01-31
Attending: EMERGENCY MEDICINE
Payer: MEDICARE

## 2022-01-31 VITALS
HEART RATE: 61 BPM | BODY MASS INDEX: 29.25 KG/M2 | HEIGHT: 66 IN | DIASTOLIC BLOOD PRESSURE: 70 MMHG | WEIGHT: 182 LBS | SYSTOLIC BLOOD PRESSURE: 118 MMHG | TEMPERATURE: 98 F | RESPIRATION RATE: 18 BRPM | OXYGEN SATURATION: 97 %

## 2022-01-31 DIAGNOSIS — R04.0 EPISTAXIS: Primary | ICD-10-CM

## 2022-01-31 LAB
ANION GAP SERPL CALC-SCNC: 10 MMOL/L (ref 8–16)
BASOPHILS # BLD AUTO: 0.03 K/UL (ref 0–0.2)
BASOPHILS NFR BLD: 0.5 % (ref 0–1.9)
BUN SERPL-MCNC: 79 MG/DL (ref 8–23)
CALCIUM SERPL-MCNC: 10.4 MG/DL (ref 8.7–10.5)
CHLORIDE SERPL-SCNC: 109 MMOL/L (ref 95–110)
CO2 SERPL-SCNC: 19 MMOL/L (ref 23–29)
CREAT SERPL-MCNC: 2.6 MG/DL (ref 0.5–1.4)
DIFFERENTIAL METHOD: ABNORMAL
EOSINOPHIL # BLD AUTO: 0.2 K/UL (ref 0–0.5)
EOSINOPHIL NFR BLD: 3.1 % (ref 0–8)
ERYTHROCYTE [DISTWIDTH] IN BLOOD BY AUTOMATED COUNT: 13.9 % (ref 11.5–14.5)
EST. GFR  (AFRICAN AMERICAN): 25.8 ML/MIN/1.73 M^2
EST. GFR  (NON AFRICAN AMERICAN): 22.3 ML/MIN/1.73 M^2
GLUCOSE SERPL-MCNC: 73 MG/DL (ref 70–110)
HCT VFR BLD AUTO: 31.1 % (ref 40–54)
HGB BLD-MCNC: 10 G/DL (ref 14–18)
IMM GRANULOCYTES # BLD AUTO: 0.01 K/UL (ref 0–0.04)
IMM GRANULOCYTES NFR BLD AUTO: 0.2 % (ref 0–0.5)
INR PPP: 3.7
INR PPP: 3.7 (ref 0.8–1.2)
LYMPHOCYTES # BLD AUTO: 1.7 K/UL (ref 1–4.8)
LYMPHOCYTES NFR BLD: 28.3 % (ref 18–48)
MCH RBC QN AUTO: 29.7 PG (ref 27–31)
MCHC RBC AUTO-ENTMCNC: 32.2 G/DL (ref 32–36)
MCV RBC AUTO: 92 FL (ref 82–98)
MONOCYTES # BLD AUTO: 0.6 K/UL (ref 0.3–1)
MONOCYTES NFR BLD: 9.9 % (ref 4–15)
NEUTROPHILS # BLD AUTO: 3.4 K/UL (ref 1.8–7.7)
NEUTROPHILS NFR BLD: 58 % (ref 38–73)
NRBC BLD-RTO: 0 /100 WBC
PLATELET # BLD AUTO: 126 K/UL (ref 150–450)
PMV BLD AUTO: 10.4 FL (ref 9.2–12.9)
POTASSIUM SERPL-SCNC: 5.4 MMOL/L (ref 3.5–5.1)
PROTHROMBIN TIME: 37.5 SEC (ref 9–12.5)
RBC # BLD AUTO: 3.37 M/UL (ref 4.6–6.2)
SODIUM SERPL-SCNC: 138 MMOL/L (ref 136–145)
WBC # BLD AUTO: 5.83 K/UL (ref 3.9–12.7)

## 2022-01-31 PROCEDURE — 25000003 PHARM REV CODE 250: Mod: HCNC | Performed by: EMERGENCY MEDICINE

## 2022-01-31 PROCEDURE — 85025 COMPLETE CBC W/AUTO DIFF WBC: CPT | Mod: HCNC | Performed by: EMERGENCY MEDICINE

## 2022-01-31 PROCEDURE — 99283 EMERGENCY DEPT VISIT LOW MDM: CPT | Mod: HCNC,,, | Performed by: EMERGENCY MEDICINE

## 2022-01-31 PROCEDURE — 80048 BASIC METABOLIC PNL TOTAL CA: CPT | Mod: HCNC | Performed by: EMERGENCY MEDICINE

## 2022-01-31 PROCEDURE — 85610 PROTHROMBIN TIME: CPT | Mod: HCNC | Performed by: EMERGENCY MEDICINE

## 2022-01-31 PROCEDURE — 99283 PR EMERGENCY DEPT VISIT,LEVEL III: ICD-10-PCS | Mod: HCNC,,, | Performed by: EMERGENCY MEDICINE

## 2022-01-31 PROCEDURE — 99283 EMERGENCY DEPT VISIT LOW MDM: CPT | Mod: 25,HCNC

## 2022-01-31 RX ORDER — OXYMETAZOLINE HCL 0.05 %
3 SPRAY, NON-AEROSOL (ML) NASAL
Status: COMPLETED | OUTPATIENT
Start: 2022-01-31 | End: 2022-01-31

## 2022-01-31 RX ORDER — TRANEXAMIC ACID 100 MG/ML
500 INJECTION, SOLUTION INTRAVENOUS ONCE
Status: COMPLETED | OUTPATIENT
Start: 2022-01-31 | End: 2022-01-31

## 2022-01-31 RX ADMIN — Medication 3 SPRAY: at 05:01

## 2022-01-31 RX ADMIN — TRANEXAMIC ACID 500 MG: 100 INJECTION, SOLUTION INTRAVENOUS at 07:01

## 2022-01-31 NOTE — PROGRESS NOTES
"1/31/22-pt wife states her 's nosebleeds are causing him to wake at night and they are not able to put pressure to relieve due to location.  She states it is better today; however, quantifies it as "a good amount of blood".  Referred pt to urgent care for evaluation.     "

## 2022-01-31 NOTE — PROGRESS NOTES
Pt's wife called to report pt has had a nosebleed since last night. She said it stopped for a little while then resumed this morning and has lasted over 15mins. They are also on day 5 of COVID quarantine

## 2022-02-01 ENCOUNTER — ANTI-COAG VISIT (OUTPATIENT)
Dept: CARDIOLOGY | Facility: CLINIC | Age: 82
End: 2022-02-01
Payer: MEDICARE

## 2022-02-01 DIAGNOSIS — Z95.2 H/O MECHANICAL AORTIC VALVE REPLACEMENT: ICD-10-CM

## 2022-02-01 DIAGNOSIS — Z79.01 LONG TERM CURRENT USE OF ANTICOAGULANT THERAPY: Primary | ICD-10-CM

## 2022-02-01 NOTE — DISCHARGE INSTRUCTIONS
Please follow-up with your primary care provider for your nose bleed, low platelet count.  You can continue your Coumadin.  Return to the ED if the bleeding recurrent and uncontrolled with direct pressure, or other concerns

## 2022-02-01 NOTE — ED PROVIDER NOTES
Encounter Date: 1/31/2022       History     Chief Complaint   Patient presents with    Covid Positive    Epistaxis     On coumadin , packed both sides of nose at home still bleeding     81 y.o. M with history of CKD, CAD, carotid artery disease, chronic warfarin use, recurrent epistaxis, presents to the ED for nose bleed. Off note, patient is currently infected with Covid-19, no hospitalization or outpatient treatment. Patient reports that his nose started bleeding after sneezing since last night. He has tried direct pressure and self packing but the bleeding persist. States that he still can feel blood dripping down to his throat. He says that he was treated with rhino-rocket and ENT cauterization before. States that his nosebleed usually happen during winter when the air is dry. Patient denies trauma to his head or face, no fever, chill, SOA, CP, abdominal pain.         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   Prosthetic aortic valve.   Porcelain aorta.   Patent LIMA graft.    Bilateral carotid artery disease 2/9/2017    Bleeding from the nose     Bleeding nose 3/21/2018    Cataract     CKD (chronic kidney disease) stage 3, GFR 30-59 ml/min 5/27/2015    Claudication of left lower extremity 9/17/2014    Colon polyp     Encounter for blood transfusion     Gastroesophageal reflux disease without esophagitis 3/19/2018    Gastrointestinal hemorrhage associated with intestinal diverticulosis 4/1/2018    H/O mechanical aortic valve replacement 09/17/2014    History of gout  9/26/2012    Hyperparathyroidism due to renal insufficiency 7/27/2015    Internal hemorrhoid 4/3/2018    Long term current use of anticoagulant therapy 9/26/2012    Mechanical heart valve present     Metabolic acidosis with normal anion gap and bicarbonate losses 3/20/2018    Mixed hyperlipidemia 9/26/2012    NSTEMI (non-ST elevated myocardial infarction) 3/21/2018    Obesity, diabetes, and hypertension syndrome 2/23/2016    PVD (peripheral vascular disease) 9/11/2012    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/2012    Type 2 diabetes mellitus with diabetic peripheral angiopathy without gangrene 5/27/2015    Type 2 diabetes mellitus with stage 3 chronic kidney disease, without long-term current use of insulin 10/2/2013     Past Surgical History:   Procedure Laterality Date    CARDIAC CATHETERIZATION      CARDIAC VALVE REPLACEMENT      CARDIAC VALVE SURGERY      CARPAL TUNNEL RELEASE Right 5/19/2020    Procedure: RELEASE, CARPAL TUNNEL;  Surgeon: Rupesh Norris Jr., MD;  Location: [facility];  Service: Plastics;  Laterality: Right;    COLON SURGERY      COLONOSCOPY N/A 3/31/2017    Procedure: COLONOSCOPY;  Surgeon: Bruno Raymond MD;  Location: [facility] (4TH FLR);  Service: Endoscopy;  Laterality: N/A;  Patient's wife requesting date.    COLONOSCOPY N/A 4/3/2018    Procedure: COLONOSCOPY;  Surgeon: Bonifacio Pelletier MD;  Location: [facility] (2ND FLR);  Service: Endoscopy;  Laterality: N/A;    COLONOSCOPY N/A 8/13/2018    Procedure: COLONOSCOPY;  Surgeon: Kam Barba MD;  Location: [facility] ENDO (2ND FLR);  Service: Endoscopy;  Laterality: N/A;  2nd floor: PA pressure 49; hx of moderate-severe valve disease     per Coumadin clinic-Patient can hold 5 days with lovenox bridge       ok to schedule per Katarina    CORONARY ANGIOGRAPHY N/A 10/4/2021    Procedure: Left heart cath +/- peripheral angiogram;  Surgeon: Jose Ruiz MD;  Location: [facility] CATH LAB;  Service: Cardiology;  Laterality: N/A;    CORONARY  ANGIOPLASTY      CORONARY ARTERY BYPASS GRAFT      CORONARY BYPASS GRAFT ANGIOGRAPHY  10/4/2021    Procedure: Bypass graft study;  Surgeon: Jose Ruiz MD;  Location: [facility] CATH LAB;  Service: Cardiology;;    HERNIA REPAIR      SPINE SURGERY      VASECTOMY       Family History   Problem Relation Age of Onset    Heart failure Mother     Heart disease Mother     Heart failure Father     Heart disease Father     Alcohol abuse Father     Heart failure Brother     Heart disease Brother     Diabetes Brother     No Known Problems Sister     No Known Problems Maternal Grandmother     No Known Problems Maternal Grandfather     No Known Problems Paternal Grandmother     No Known Problems Paternal Grandfather     Heart disease Sister     No Known Problems Maternal Aunt     No Known Problems Maternal Uncle     No Known Problems Paternal Aunt     No Known Problems Paternal Uncle     Amblyopia Neg Hx     Blindness Neg Hx     Cancer Neg Hx     Cataracts Neg Hx     Glaucoma Neg Hx     Hypertension Neg Hx     Macular degeneration Neg Hx     Retinal detachment Neg Hx     Strabismus Neg Hx     Stroke Neg Hx     Thyroid disease Neg Hx     Anemia Neg Hx     Arrhythmia Neg Hx     Asthma Neg Hx     Clotting disorder Neg Hx     Fainting Neg Hx     Heart attack Neg Hx     Hyperlipidemia Neg Hx     Atrial Septal Defect Neg Hx     Melanoma Neg Hx      Social History     Tobacco Use    Smoking status: Former Smoker     Packs/day: 1.00     Years: 20.00     Pack years: 20.00     Quit date: 1980     Years since quittin.4    Smokeless tobacco: Never Used   Substance Use Topics    Alcohol use: No    Drug use: No     Review of Systems   Constitutional: Negative for fever.   HENT: Positive for nosebleeds. Negative for sinus pain.    Eyes: Negative for pain and visual disturbance.   Respiratory: Negative for cough and shortness of breath.    Cardiovascular: Negative for chest pain and  palpitations.   Gastrointestinal: Negative for abdominal distention, abdominal pain, nausea and vomiting.   Genitourinary: Negative for dysuria and flank pain.   Musculoskeletal: Negative for back pain and neck pain.   Skin: Negative for rash.   Neurological: Negative for numbness and headaches.       Physical Exam     Initial Vitals [01/31/22 1455]   BP Pulse Resp Temp SpO2   118/70 61 18 97.5 °F (36.4 °C) 97 %      MAP       --         Physical Exam    Nursing note and vitals reviewed.  Constitutional: He appears well-developed. No distress.   HENT:   Head: Normocephalic and atraumatic.   Nose: Nose normal.   Mouth/Throat: Oropharynx is clear and moist.   Packing presents in both of his nostril, soaking with blood. Fresh blood present in his oropharyngeal cavity   Eyes: Conjunctivae and EOM are normal.   Neck: Neck supple.   Normal range of motion.  Cardiovascular: Normal rate and regular rhythm.   Pulmonary/Chest: Breath sounds normal. No respiratory distress.   Abdominal: Abdomen is soft. He exhibits no distension. There is no abdominal tenderness.   Musculoskeletal:         General: No tenderness or edema. Normal range of motion.      Cervical back: Normal range of motion and neck supple.     Neurological: He is alert and oriented to person, place, and time. He has normal strength.   Skin: Skin is warm and dry. Capillary refill takes less than 2 seconds. No pallor.         ED Course   Procedures  Labs Reviewed   CBC W/ AUTO DIFFERENTIAL - Abnormal; Notable for the following components:       Result Value    RBC 3.37 (*)     Hemoglobin 10.0 (*)     Hematocrit 31.1 (*)     Platelets 126 (*)     All other components within normal limits   BASIC METABOLIC PANEL - Abnormal; Notable for the following components:    Potassium 5.4 (*)     CO2 19 (*)     BUN 79 (*)     Creatinine 2.6 (*)     eGFR if  25.8 (*)     eGFR if non  22.3 (*)     All other components within normal limits    PROTIME-INR - Abnormal; Notable for the following components:    Prothrombin Time 37.5 (*)     INR 3.7 (*)     All other components within normal limits          Imaging Results    None          Medications   oxymetazoline 0.05 % nasal spray 3 spray (3 sprays Each Nostril Given 1/31/22 1720)   tranexamic acid injection Soln 500 mg (500 mg Nasal Given 1/31/22 1940)     Medical Decision Making:   History:   Old Medical Records: I decided to obtain old medical records.  Initial Assessment:   80 yo with history of chronic warfarin use and epistaxis, presents to the ED for 1 day of nosebleed. Patient is well appearing, atraumatic head, bleeding present from his left nostril, reassuring physical exam. No halo sign around the blood on his napkin.Vital signs stable.   Differential Diagnosis:   Anterior vs. Posterior epistaxis. Doubt trauma.   Clinical Tests:   Lab Tests: Ordered and Reviewed  ED Management:  Labs results negative for acute anemia. Noted CKD and thrombocytopenia, INR within theraputic range. Epistaxis was initially treated with Afrin spray, following direct pressure. Bleeding recurrent. Given TXA nebulizer, following direct pressure. Bleeding controlled. Patient was observed for another 30 minutes, no re-bleeding. Patient is stable to be discharge home, recommended f/u with his PCP for platelet count follow up and ENT for his epistaxis management. Coumadin is safe to continue. Patient agreed with disposition, no other concerns.              ED Course as of 02/01/22 0203   Mon Jan 31, 2022 1914 Sodium: 138 [NC]   1914 Potassium(!): 5.4 [NC]   1914 BUN(!): 79 [NC]   1914 Creatinine(!): 2.6  History of CKD, no new JIM [NC]   1914 WBC: 5.83 [NC]   1915 Hemoglobin(!): 10.0  Anemia at baseline, no significant drop of Hgb [NC]   1915 Hematocrit(!): 31.1 [NC]   1915 Platelets(!): 126 [NC]      ED Course User Index  [NC] Rudi Torres MD             Clinical Impression:   Final diagnoses:  [R04.0] Epistaxis  (Primary)          ED Disposition Condition    Discharge Stable        ED Prescriptions     None        Follow-up Information     Follow up With Specialties Details Why Contact Info    Devon Langston Jr., MD Internal Medicine In 1 week  [address]  [place] [address]  [phone]             Rudi Torres MD  Resident  02/01/22 0124

## 2022-02-03 ENCOUNTER — HOSPITAL ENCOUNTER (OUTPATIENT)
Facility: HOSPITAL | Age: 82
Discharge: HOME | End: 2022-02-04
Attending: EMERGENCY MEDICINE | Admitting: INTERNAL MEDICINE
Payer: MEDICARE

## 2022-02-03 DIAGNOSIS — R07.9 CHEST PAIN: ICD-10-CM

## 2022-02-03 DIAGNOSIS — N18.32 STAGE 3B CHRONIC KIDNEY DISEASE: Chronic | ICD-10-CM

## 2022-02-03 DIAGNOSIS — R04.0 EPISTAXIS: Primary | ICD-10-CM

## 2022-02-03 DIAGNOSIS — U07.1 COVID-19: ICD-10-CM

## 2022-02-03 DIAGNOSIS — R53.1 WEAKNESS: ICD-10-CM

## 2022-02-03 PROBLEM — D64.9 ANEMIA: Status: ACTIVE | Noted: 2022-02-03

## 2022-02-03 LAB
ALBUMIN SERPL BCP-MCNC: 3.7 G/DL (ref 3.5–5.2)
ALP SERPL-CCNC: 58 U/L (ref 55–135)
ALT SERPL W/O P-5'-P-CCNC: 19 U/L (ref 10–44)
ANION GAP SERPL CALC-SCNC: 9 MMOL/L (ref 8–16)
ANION GAP SERPL CALC-SCNC: 9 MMOL/L (ref 8–16)
AST SERPL-CCNC: 23 U/L (ref 10–40)
BASOPHILS # BLD AUTO: 0.02 K/UL (ref 0–0.2)
BASOPHILS # BLD AUTO: 0.03 K/UL (ref 0–0.2)
BASOPHILS # BLD AUTO: 0.03 K/UL (ref 0–0.2)
BASOPHILS # BLD AUTO: 0.04 K/UL (ref 0–0.2)
BASOPHILS NFR BLD: 0.3 % (ref 0–1.9)
BASOPHILS NFR BLD: 0.6 % (ref 0–1.9)
BASOPHILS NFR BLD: 0.6 % (ref 0–1.9)
BASOPHILS NFR BLD: 0.8 % (ref 0–1.9)
BILIRUB SERPL-MCNC: 0.4 MG/DL (ref 0.1–1)
BUN SERPL-MCNC: 70 MG/DL (ref 8–23)
BUN SERPL-MCNC: 79 MG/DL (ref 8–23)
CALCIUM SERPL-MCNC: 10 MG/DL (ref 8.7–10.5)
CALCIUM SERPL-MCNC: 9.7 MG/DL (ref 8.7–10.5)
CHLORIDE SERPL-SCNC: 108 MMOL/L (ref 95–110)
CHLORIDE SERPL-SCNC: 109 MMOL/L (ref 95–110)
CO2 SERPL-SCNC: 19 MMOL/L (ref 23–29)
CO2 SERPL-SCNC: 21 MMOL/L (ref 23–29)
CREAT SERPL-MCNC: 2.7 MG/DL (ref 0.5–1.4)
CREAT SERPL-MCNC: 3.1 MG/DL (ref 0.5–1.4)
CREAT UR-MCNC: 67 MG/DL (ref 23–375)
DIFFERENTIAL METHOD: ABNORMAL
EOSINOPHIL # BLD AUTO: 0.2 K/UL (ref 0–0.5)
EOSINOPHIL NFR BLD: 3 % (ref 0–8)
EOSINOPHIL NFR BLD: 3.2 % (ref 0–8)
EOSINOPHIL NFR BLD: 3.3 % (ref 0–8)
EOSINOPHIL NFR BLD: 3.8 % (ref 0–8)
ERYTHROCYTE [DISTWIDTH] IN BLOOD BY AUTOMATED COUNT: 13.6 % (ref 11.5–14.5)
ERYTHROCYTE [DISTWIDTH] IN BLOOD BY AUTOMATED COUNT: 13.7 % (ref 11.5–14.5)
ERYTHROCYTE [DISTWIDTH] IN BLOOD BY AUTOMATED COUNT: 13.7 % (ref 11.5–14.5)
ERYTHROCYTE [DISTWIDTH] IN BLOOD BY AUTOMATED COUNT: 13.8 % (ref 11.5–14.5)
EST. GFR  (AFRICAN AMERICAN): 20.8 ML/MIN/1.73 M^2
EST. GFR  (AFRICAN AMERICAN): 24.6 ML/MIN/1.73 M^2
EST. GFR  (NON AFRICAN AMERICAN): 18 ML/MIN/1.73 M^2
EST. GFR  (NON AFRICAN AMERICAN): 21.3 ML/MIN/1.73 M^2
GLUCOSE SERPL-MCNC: 65 MG/DL (ref 70–110)
GLUCOSE SERPL-MCNC: 78 MG/DL (ref 70–110)
HCT VFR BLD AUTO: 27 % (ref 40–54)
HCT VFR BLD AUTO: 27 % (ref 40–54)
HCT VFR BLD AUTO: 27.8 % (ref 40–54)
HCT VFR BLD AUTO: 28.5 % (ref 40–54)
HGB BLD-MCNC: 8.5 G/DL (ref 14–18)
HGB BLD-MCNC: 8.8 G/DL (ref 14–18)
HGB BLD-MCNC: 8.9 G/DL (ref 14–18)
HGB BLD-MCNC: 9.4 G/DL (ref 14–18)
IMM GRANULOCYTES # BLD AUTO: 0.01 K/UL (ref 0–0.04)
IMM GRANULOCYTES # BLD AUTO: 0.01 K/UL (ref 0–0.04)
IMM GRANULOCYTES # BLD AUTO: 0.02 K/UL (ref 0–0.04)
IMM GRANULOCYTES # BLD AUTO: 0.02 K/UL (ref 0–0.04)
IMM GRANULOCYTES NFR BLD AUTO: 0.2 % (ref 0–0.5)
IMM GRANULOCYTES NFR BLD AUTO: 0.2 % (ref 0–0.5)
IMM GRANULOCYTES NFR BLD AUTO: 0.3 % (ref 0–0.5)
IMM GRANULOCYTES NFR BLD AUTO: 0.4 % (ref 0–0.5)
INR PPP: 3.4 (ref 0.8–1.2)
LYMPHOCYTES # BLD AUTO: 1.2 K/UL (ref 1–4.8)
LYMPHOCYTES # BLD AUTO: 1.4 K/UL (ref 1–4.8)
LYMPHOCYTES NFR BLD: 23.8 % (ref 18–48)
LYMPHOCYTES NFR BLD: 24.4 % (ref 18–48)
LYMPHOCYTES NFR BLD: 29.3 % (ref 18–48)
LYMPHOCYTES NFR BLD: 30 % (ref 18–48)
MAGNESIUM SERPL-MCNC: 1.3 MG/DL (ref 1.6–2.6)
MCH RBC QN AUTO: 29.2 PG (ref 27–31)
MCH RBC QN AUTO: 29.5 PG (ref 27–31)
MCH RBC QN AUTO: 29.8 PG (ref 27–31)
MCH RBC QN AUTO: 30.1 PG (ref 27–31)
MCHC RBC AUTO-ENTMCNC: 31.5 G/DL (ref 32–36)
MCHC RBC AUTO-ENTMCNC: 32 G/DL (ref 32–36)
MCHC RBC AUTO-ENTMCNC: 32.6 G/DL (ref 32–36)
MCHC RBC AUTO-ENTMCNC: 33 G/DL (ref 32–36)
MCV RBC AUTO: 91 FL (ref 82–98)
MCV RBC AUTO: 91 FL (ref 82–98)
MCV RBC AUTO: 93 FL (ref 82–98)
MCV RBC AUTO: 93 FL (ref 82–98)
MONOCYTES # BLD AUTO: 0.5 K/UL (ref 0.3–1)
MONOCYTES # BLD AUTO: 0.6 K/UL (ref 0.3–1)
MONOCYTES NFR BLD: 10.5 % (ref 4–15)
MONOCYTES NFR BLD: 10.7 % (ref 4–15)
MONOCYTES NFR BLD: 12.7 % (ref 4–15)
MONOCYTES NFR BLD: 13.5 % (ref 4–15)
NEUTROPHILS # BLD AUTO: 2.5 K/UL (ref 1.8–7.7)
NEUTROPHILS # BLD AUTO: 2.6 K/UL (ref 1.8–7.7)
NEUTROPHILS # BLD AUTO: 3 K/UL (ref 1.8–7.7)
NEUTROPHILS # BLD AUTO: 3.6 K/UL (ref 1.8–7.7)
NEUTROPHILS NFR BLD: 51.9 % (ref 38–73)
NEUTROPHILS NFR BLD: 53.9 % (ref 38–73)
NEUTROPHILS NFR BLD: 60.9 % (ref 38–73)
NEUTROPHILS NFR BLD: 61.7 % (ref 38–73)
NRBC BLD-RTO: 0 /100 WBC
OSMOLALITY UR: 375 MOSM/KG (ref 50–1200)
PHOSPHATE SERPL-MCNC: 3.2 MG/DL (ref 2.7–4.5)
PLATELET # BLD AUTO: 113 K/UL (ref 150–450)
PLATELET # BLD AUTO: 114 K/UL (ref 150–450)
PLATELET # BLD AUTO: 122 K/UL (ref 150–450)
PLATELET # BLD AUTO: 124 K/UL (ref 150–450)
PMV BLD AUTO: 10.4 FL (ref 9.2–12.9)
PMV BLD AUTO: 10.7 FL (ref 9.2–12.9)
PMV BLD AUTO: 10.8 FL (ref 9.2–12.9)
PMV BLD AUTO: 11.1 FL (ref 9.2–12.9)
POTASSIUM SERPL-SCNC: 4.3 MMOL/L (ref 3.5–5.1)
POTASSIUM SERPL-SCNC: 4.6 MMOL/L (ref 3.5–5.1)
PROT SERPL-MCNC: 8 G/DL (ref 6–8.4)
PROTHROMBIN TIME: 34.3 SEC (ref 9–12.5)
RBC # BLD AUTO: 2.91 M/UL (ref 4.6–6.2)
RBC # BLD AUTO: 2.98 M/UL (ref 4.6–6.2)
RBC # BLD AUTO: 2.99 M/UL (ref 4.6–6.2)
RBC # BLD AUTO: 3.12 M/UL (ref 4.6–6.2)
SODIUM SERPL-SCNC: 137 MMOL/L (ref 136–145)
SODIUM SERPL-SCNC: 138 MMOL/L (ref 136–145)
SODIUM UR-SCNC: 56 MMOL/L (ref 20–250)
UUN UR-MCNC: 538 MG/DL (ref 140–1050)
WBC # BLD AUTO: 4.73 K/UL (ref 3.9–12.7)
WBC # BLD AUTO: 4.88 K/UL (ref 3.9–12.7)
WBC # BLD AUTO: 4.96 K/UL (ref 3.9–12.7)
WBC # BLD AUTO: 5.89 K/UL (ref 3.9–12.7)

## 2022-02-03 PROCEDURE — 96361 HYDRATE IV INFUSION ADD-ON: CPT | Mod: HCNC

## 2022-02-03 PROCEDURE — 99214 OFFICE O/P EST MOD 30 MIN: CPT | Mod: HCNC,,, | Performed by: OTOLARYNGOLOGY

## 2022-02-03 PROCEDURE — 96367 TX/PROPH/DG ADDL SEQ IV INF: CPT | Mod: HCNC

## 2022-02-03 PROCEDURE — 63600175 PHARM REV CODE 636 W HCPCS: Mod: HCNC | Performed by: STUDENT IN AN ORGANIZED HEALTH CARE EDUCATION/TRAINING PROGRAM

## 2022-02-03 PROCEDURE — 80048 BASIC METABOLIC PNL TOTAL CA: CPT | Mod: HCNC | Performed by: STUDENT IN AN ORGANIZED HEALTH CARE EDUCATION/TRAINING PROGRAM

## 2022-02-03 PROCEDURE — G0378 HOSPITAL OBSERVATION PER HR: HCPCS | Mod: HCNC

## 2022-02-03 PROCEDURE — 96365 THER/PROPH/DIAG IV INF INIT: CPT | Mod: HCNC

## 2022-02-03 PROCEDURE — 99214 PR OFFICE/OUTPT VISIT, EST, LEVL IV, 30-39 MIN: ICD-10-PCS | Mod: HCNC,,, | Performed by: OTOLARYNGOLOGY

## 2022-02-03 PROCEDURE — 83935 ASSAY OF URINE OSMOLALITY: CPT | Mod: HCNC

## 2022-02-03 PROCEDURE — 82570 ASSAY OF URINE CREATININE: CPT | Mod: HCNC

## 2022-02-03 PROCEDURE — 99284 PR EMERGENCY DEPT VISIT,LEVEL IV: ICD-10-PCS | Mod: HCNC,,, | Performed by: EMERGENCY MEDICINE

## 2022-02-03 PROCEDURE — 93010 EKG 12-LEAD: ICD-10-PCS | Mod: HCNC,,, | Performed by: INTERNAL MEDICINE

## 2022-02-03 PROCEDURE — 85025 COMPLETE CBC W/AUTO DIFF WBC: CPT | Mod: 91,HCNC

## 2022-02-03 PROCEDURE — 93010 ELECTROCARDIOGRAM REPORT: CPT | Mod: HCNC,,, | Performed by: INTERNAL MEDICINE

## 2022-02-03 PROCEDURE — 36415 COLL VENOUS BLD VENIPUNCTURE: CPT | Mod: HCNC

## 2022-02-03 PROCEDURE — 25000003 PHARM REV CODE 250: Mod: HCNC | Performed by: STUDENT IN AN ORGANIZED HEALTH CARE EDUCATION/TRAINING PROGRAM

## 2022-02-03 PROCEDURE — 99220 PR INITIAL OBSERVATION CARE,LEVL III: ICD-10-PCS | Mod: HCNC,GC,, | Performed by: INTERNAL MEDICINE

## 2022-02-03 PROCEDURE — 63600175 PHARM REV CODE 636 W HCPCS: Mod: HCNC

## 2022-02-03 PROCEDURE — 85025 COMPLETE CBC W/AUTO DIFF WBC: CPT | Mod: HCNC | Performed by: EMERGENCY MEDICINE

## 2022-02-03 PROCEDURE — 93005 ELECTROCARDIOGRAM TRACING: CPT | Mod: HCNC

## 2022-02-03 PROCEDURE — 84100 ASSAY OF PHOSPHORUS: CPT | Mod: HCNC

## 2022-02-03 PROCEDURE — 25000003 PHARM REV CODE 250: Mod: HCNC

## 2022-02-03 PROCEDURE — 85610 PROTHROMBIN TIME: CPT | Mod: HCNC | Performed by: EMERGENCY MEDICINE

## 2022-02-03 PROCEDURE — 80053 COMPREHEN METABOLIC PANEL: CPT | Mod: HCNC | Performed by: EMERGENCY MEDICINE

## 2022-02-03 PROCEDURE — 99285 EMERGENCY DEPT VISIT HI MDM: CPT | Mod: 25,HCNC

## 2022-02-03 PROCEDURE — 84300 ASSAY OF URINE SODIUM: CPT | Mod: HCNC

## 2022-02-03 PROCEDURE — 96366 THER/PROPH/DIAG IV INF ADDON: CPT | Mod: HCNC

## 2022-02-03 PROCEDURE — 99220 PR INITIAL OBSERVATION CARE,LEVL III: CPT | Mod: HCNC,GC,, | Performed by: INTERNAL MEDICINE

## 2022-02-03 PROCEDURE — 83735 ASSAY OF MAGNESIUM: CPT | Mod: HCNC

## 2022-02-03 PROCEDURE — 84540 ASSAY OF URINE/UREA-N: CPT | Mod: HCNC

## 2022-02-03 PROCEDURE — 99284 EMERGENCY DEPT VISIT MOD MDM: CPT | Mod: HCNC,,, | Performed by: EMERGENCY MEDICINE

## 2022-02-03 RX ORDER — IBUPROFEN 200 MG
16 TABLET ORAL
Status: DISCONTINUED | OUTPATIENT
Start: 2022-02-03 | End: 2022-02-04 | Stop reason: HOSPADM

## 2022-02-03 RX ORDER — MAGNESIUM SULFATE HEPTAHYDRATE 40 MG/ML
2 INJECTION, SOLUTION INTRAVENOUS ONCE
Status: COMPLETED | OUTPATIENT
Start: 2022-02-03 | End: 2022-02-03

## 2022-02-03 RX ORDER — NALOXONE HCL 0.4 MG/ML
0.02 VIAL (ML) INJECTION
Status: DISCONTINUED | OUTPATIENT
Start: 2022-02-03 | End: 2022-02-04 | Stop reason: HOSPADM

## 2022-02-03 RX ORDER — OXYMETAZOLINE HCL 0.05 %
2 SPRAY, NON-AEROSOL (ML) NASAL EVERY 6 HOURS PRN
Status: DISCONTINUED | OUTPATIENT
Start: 2022-02-03 | End: 2022-02-04 | Stop reason: HOSPADM

## 2022-02-03 RX ORDER — SODIUM CHLORIDE 0.9 % (FLUSH) 0.9 %
10 SYRINGE (ML) INJECTION
Status: DISCONTINUED | OUTPATIENT
Start: 2022-02-03 | End: 2022-02-04 | Stop reason: HOSPADM

## 2022-02-03 RX ORDER — TALC
6 POWDER (GRAM) TOPICAL NIGHTLY PRN
Status: DISCONTINUED | OUTPATIENT
Start: 2022-02-03 | End: 2022-02-04 | Stop reason: HOSPADM

## 2022-02-03 RX ORDER — ATORVASTATIN CALCIUM 20 MG/1
80 TABLET, FILM COATED ORAL DAILY
Status: DISCONTINUED | OUTPATIENT
Start: 2022-02-03 | End: 2022-02-04 | Stop reason: HOSPADM

## 2022-02-03 RX ORDER — CLOPIDOGREL BISULFATE 75 MG/1
75 TABLET ORAL DAILY
Status: DISCONTINUED | OUTPATIENT
Start: 2022-02-03 | End: 2022-02-03

## 2022-02-03 RX ORDER — OXYMETAZOLINE HCL 0.05 %
1 SPRAY, NON-AEROSOL (ML) NASAL
Status: DISCONTINUED | OUTPATIENT
Start: 2022-02-03 | End: 2022-02-03

## 2022-02-03 RX ORDER — SODIUM CHLORIDE 0.9 % (FLUSH) 0.9 %
10 SYRINGE (ML) INJECTION EVERY 12 HOURS PRN
Status: DISCONTINUED | OUTPATIENT
Start: 2022-02-03 | End: 2022-02-04 | Stop reason: HOSPADM

## 2022-02-03 RX ORDER — SODIUM CHLORIDE 9 MG/ML
INJECTION, SOLUTION INTRAVENOUS CONTINUOUS
Status: ACTIVE | OUTPATIENT
Start: 2022-02-03 | End: 2022-02-03

## 2022-02-03 RX ORDER — TALC
6 POWDER (GRAM) TOPICAL NIGHTLY PRN
Status: DISCONTINUED | OUTPATIENT
Start: 2022-02-03 | End: 2022-02-03

## 2022-02-03 RX ORDER — IBUPROFEN 200 MG
24 TABLET ORAL
Status: DISCONTINUED | OUTPATIENT
Start: 2022-02-03 | End: 2022-02-04 | Stop reason: HOSPADM

## 2022-02-03 RX ORDER — CLOPIDOGREL BISULFATE 75 MG/1
75 TABLET ORAL DAILY
Status: DISCONTINUED | OUTPATIENT
Start: 2022-02-03 | End: 2022-02-04 | Stop reason: HOSPADM

## 2022-02-03 RX ORDER — LOPERAMIDE HCL 2 MG
TABLET ORAL
Status: ON HOLD | COMMUNITY
End: 2022-10-24

## 2022-02-03 RX ORDER — GLUCAGON 1 MG
1 KIT INJECTION
Status: DISCONTINUED | OUTPATIENT
Start: 2022-02-03 | End: 2022-02-04 | Stop reason: HOSPADM

## 2022-02-03 RX ORDER — WARFARIN 2.5 MG/1
7.5 TABLET ORAL
Status: DISCONTINUED | OUTPATIENT
Start: 2022-02-05 | End: 2022-02-04 | Stop reason: HOSPADM

## 2022-02-03 RX ORDER — WARFARIN 2.5 MG/1
5 TABLET ORAL
Status: DISCONTINUED | OUTPATIENT
Start: 2022-02-04 | End: 2022-02-04 | Stop reason: HOSPADM

## 2022-02-03 RX ORDER — ONDANSETRON 8 MG/1
8 TABLET, ORALLY DISINTEGRATING ORAL EVERY 8 HOURS PRN
Status: DISCONTINUED | OUTPATIENT
Start: 2022-02-03 | End: 2022-02-04 | Stop reason: HOSPADM

## 2022-02-03 RX ORDER — ACETAMINOPHEN 325 MG/1
650 TABLET ORAL EVERY 4 HOURS PRN
Status: DISCONTINUED | OUTPATIENT
Start: 2022-02-03 | End: 2022-02-04 | Stop reason: HOSPADM

## 2022-02-03 RX ORDER — MUPIROCIN 20 MG/G
OINTMENT TOPICAL 2 TIMES DAILY
Status: DISCONTINUED | OUTPATIENT
Start: 2022-02-03 | End: 2022-02-04 | Stop reason: HOSPADM

## 2022-02-03 RX ADMIN — OXYMETAZOLINE HCL 2 SPRAY: 0.05 SPRAY NASAL at 11:02

## 2022-02-03 RX ADMIN — CLOPIDOGREL 75 MG: 75 TABLET, FILM COATED ORAL at 10:02

## 2022-02-03 RX ADMIN — REMDESIVIR 200 MG: 100 INJECTION, POWDER, LYOPHILIZED, FOR SOLUTION INTRAVENOUS at 10:02

## 2022-02-03 RX ADMIN — NASAL 1 SPRAY: 6.5 SPRAY NASAL at 03:02

## 2022-02-03 RX ADMIN — ATORVASTATIN CALCIUM 80 MG: 20 TABLET, FILM COATED ORAL at 08:02

## 2022-02-03 RX ADMIN — MAGNESIUM SULFATE 2 G: 2 INJECTION INTRAVENOUS at 08:02

## 2022-02-03 RX ADMIN — SODIUM CHLORIDE: 0.9 INJECTION, SOLUTION INTRAVENOUS at 01:02

## 2022-02-03 RX ADMIN — MUPIROCIN: 20 OINTMENT TOPICAL at 08:02

## 2022-02-03 RX ADMIN — NASAL 1 SPRAY: 6.5 SPRAY NASAL at 08:02

## 2022-02-03 RX ADMIN — SODIUM CHLORIDE, SODIUM LACTATE, POTASSIUM CHLORIDE, AND CALCIUM CHLORIDE 1000 ML: .6; .31; .03; .02 INJECTION, SOLUTION INTRAVENOUS at 04:02

## 2022-02-03 NOTE — ED NOTES
Pt unable to provide urine sample at this time.    Spoke with daughter, patient is not having any edema or swelling, advised to try taking 1/2 dose of lasix (20mg) daily until see's Dr. Rocha in office 11/13/17 to see if this improves dizziness. Advised also ok to stop potassium after tomorrow or only take every other day until see's Dr. Rocha in office. Daughter agrees with plan and will call office if symptoms do not improve or worsen at any time.

## 2022-02-03 NOTE — SUBJECTIVE & OBJECTIVE
Past Medical History:   Diagnosis Date    Anemia of chronic renal failure, stage 3 (moderate) 5/27/2015    Anticoagulant long-term use     Atherosclerosis of coronary artery bypass graft of native heart without angina pectoris 9/11/2012    3-27-18 [facility] Two vessel coronary artery disease.   Prosthetic aortic valve.   Porcelain aorta.   Patent LIMA graft.    Bilateral carotid artery disease 2/9/2017    Bleeding from the nose     Bleeding nose 3/21/2018    Cataract     CKD (chronic kidney disease) stage 3, GFR 30-59 ml/min 5/27/2015    Claudication of left lower extremity 9/17/2014    Colon polyp     Encounter for blood transfusion     Gastroesophageal reflux disease without esophagitis 3/19/2018    Gastrointestinal hemorrhage associated with intestinal diverticulosis 4/1/2018    H/O mechanical aortic valve replacement 09/17/2014    History of gout 9/26/2012    Hyperparathyroidism due to renal insufficiency 7/27/2015    Internal hemorrhoid 4/3/2018    Long term current use of anticoagulant therapy 9/26/2012    Mechanical heart valve present     Metabolic acidosis with normal anion gap and bicarbonate losses 3/20/2018    Mixed hyperlipidemia 9/26/2012    NSTEMI (non-ST elevated myocardial infarction) 3/21/2018    Obesity, diabetes, and hypertension syndrome 2/23/2016    PVD (peripheral vascular disease) 9/11/2012    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/2012    Type 2 diabetes mellitus with diabetic peripheral angiopathy without gangrene 5/27/2015    Type 2 diabetes mellitus with stage 3 chronic kidney disease, without long-term current use of insulin 10/2/2013       Past Surgical History:   Procedure Laterality Date    CARDIAC CATHETERIZATION      CARDIAC VALVE REPLACEMENT      CARDIAC VALVE SURGERY      CARPAL TUNNEL RELEASE Right 5/19/2020    Procedure: RELEASE, CARPAL TUNNEL;  Surgeon: Rupesh Norris Jr., MD;  Location[facility];  Service: Plastics;  Laterality: Right;    COLON SURGERY       COLONOSCOPY N/A 3/31/2017    Procedure: COLONOSCOPY;  Surgeon: Bruno Raymond MD;  Location: [facility] ENDO (4TH FLR);  Service: Endoscopy;  Laterality: N/A;  Patient's wife requesting date.    COLONOSCOPY N/A 4/3/2018    Procedure: COLONOSCOPY;  Surgeon: Bonifacio Pelletier MD;  Location: [facility] ENDO (2ND FLR);  Service: Endoscopy;  Laterality: N/A;    COLONOSCOPY N/A 8/13/2018    Procedure: COLONOSCOPY;  Surgeon: Kam Barba MD;  Location: [facility] ENDO (2ND FLR);  Service: Endoscopy;  Laterality: N/A;  2nd floor: PA pressure 49; hx of moderate-severe valve disease     per Coumadin clinic-Patient can hold 5 days with lovenox bridge       ok to schedule per Katarina    CORONARY ANGIOGRAPHY N/A 10/4/2021    Procedure: Left heart cath +/- peripheral angiogram;  Surgeon: Jose Ruiz MD;  Location: [facility] CATH LAB;  Service: Cardiology;  Laterality: N/A;    CORONARY ANGIOPLASTY      CORONARY ARTERY BYPASS GRAFT      CORONARY BYPASS GRAFT ANGIOGRAPHY  10/4/2021    Procedure: Bypass graft study;  Surgeon: Jose Ruiz MD;  Location: [facility] CATH LAB;  Service: Cardiology;;    HERNIA REPAIR      SPINE SURGERY      VASECTOMY         Review of patient's allergies indicates:   Allergen Reactions    Fosinopril      Intolerance- elevates potassium level      Losartan      Intolerance- elevates potassium level       No current facility-administered medications on file prior to encounter.     Current Outpatient Medications on File Prior to Encounter   Medication Sig    allopurinoL (ZYLOPRIM) 100 MG tablet TAKE 1 TABLET EVERY DAY    aspirin (ECOTRIN) 81 MG EC tablet Take 1 tablet (81 mg total) by mouth once daily.    bumetanide (BUMEX) 2 MG tablet Take 2mg Once to twice daily or as directed    carvediloL (COREG) 12.5 MG tablet Take 1 tablet (12.5 mg total) by mouth 2 (two) times daily.    clopidogreL (PLAVIX) 75 mg tablet Take 1 tablet (75 mg total) by mouth once daily.    enoxaparin (LOVENOX) 150 mg/mL Syrg Inject  130mg SUBQ daily    glimepiride (AMARYL) 1 MG tablet Take 1 tablet (1 mg total) by mouth every morning.    isosorbide mononitrate (IMDUR) 120 MG 24 hr tablet TAKE 1 TABLET EVERY DAY    lisinopriL (PRINIVIL,ZESTRIL) 2.5 MG tablet TAKE 1 TABLET (2.5 MG TOTAL) BY MOUTH ONCE DAILY.    NIFEdipine (PROCARDIA-XL) 60 MG (OSM) 24 hr tablet Take 1 tablet (60 mg total) by mouth once daily.    nitroGLYCERIN (NITROSTAT) 0.4 MG SL tablet Place 1 tablet (0.4 mg total) under the tongue every 5 (five) minutes as needed. As needed for chest pain    omega-3 acid ethyl esters (LOVAZA) 1 gram capsule Take 2 capsules (2 g total) by mouth 2 (two) times daily. (Patient taking differently: Take 2 g by mouth once daily.)    rosuvastatin (CRESTOR) 40 MG Tab TAKE 1 TABLET EVERY EVENING.    warfarin (COUMADIN) 5 MG tablet Take 2 tablets (10mg) by mouth , then 1.5 tablets (7.5mg) by mouth all other days as instructed by Coumadin Clinic.    warfarin (COUMADIN) 5 MG tablet TAKE 1 AND 1/2 TO 2 TABLETS (7.5 TO 10 MG TOTAL) BY  MOUTH DAILY AS DIRECTED BY COUMADIN CLINIC     Family History     Problem Relation (Age of Onset)    Alcohol abuse Father    Diabetes Brother    Heart disease Mother, Father, Brother, Sister    Heart failure Mother, Father, Brother    No Known Problems Sister, Maternal Grandmother, Maternal Grandfather, Paternal Grandmother, Paternal Grandfather, Maternal Aunt, Maternal Uncle, Paternal Aunt, Paternal Uncle        Tobacco Use    Smoking status: Former Smoker     Packs/day: 1.00     Years: 20.00     Pack years: 20.00     Quit date: 1980     Years since quittin.4    Smokeless tobacco: Never Used   Substance and Sexual Activity    Alcohol use: No    Drug use: No    Sexual activity: Not Currently     Review of Systems   Constitutional: Positive for activity change, fatigue and unexpected weight change. Negative for chills and fever.   HENT: Positive for nosebleeds and sinus pressure. Negative for  drooling, sinus pain and trouble swallowing.    Eyes: Negative for photophobia and visual disturbance.   Respiratory: Negative for cough, shortness of breath and wheezing.    Cardiovascular: Negative for chest pain and palpitations.   Gastrointestinal: Positive for diarrhea. Negative for blood in stool, constipation, nausea and vomiting.   Genitourinary: Negative for difficulty urinating, hematuria and urgency.   Musculoskeletal: Negative for back pain and myalgias.   Skin: Negative for color change and rash.   Neurological: Negative for dizziness, syncope, weakness, light-headedness and headaches.   Psychiatric/Behavioral: Negative for agitation and behavioral problems.     Objective:     Vital Signs (Most Recent):  Temp: 98.1 °F (36.7 °C) (02/03/22 0545)  Pulse: 66 (02/03/22 0545)  Resp: 18 (02/03/22 0545)  BP: 126/61 (02/03/22 0545)  SpO2: 98 % (02/03/22 0545) Vital Signs (24h Range):  Temp:  [97.8 °F (36.6 °C)-98.1 °F (36.7 °C)] 98.1 °F (36.7 °C)  Pulse:  [63-66] 66  Resp:  [16-20] 18  SpO2:  [98 %-99 %] 98 %  BP: (125-131)/(57-63) 126/61     Weight: 81.6 kg (180 lb)  Body mass index is 29.05 kg/m².    Physical Exam  Constitutional:       Appearance: Normal appearance. He is not ill-appearing.   HENT:      Head: Normocephalic and atraumatic.      Right Ear: External ear normal.      Left Ear: External ear normal.      Nose:      Comments: Dried blood in bilateral nostrils      Mouth/Throat:      Mouth: Mucous membranes are dry.      Pharynx: Oropharynx is clear.   Eyes:      General: No scleral icterus.     Extraocular Movements: Extraocular movements intact.      Conjunctiva/sclera: Conjunctivae normal.   Cardiovascular:      Rate and Rhythm: Normal rate and regular rhythm.      Heart sounds: Normal heart sounds. No murmur heard.      Pulmonary:      Effort: Pulmonary effort is normal.      Breath sounds: Normal breath sounds.   Abdominal:      General: Bowel sounds are normal. There is no distension.       Palpations: Abdomen is soft. There is no mass.      Tenderness: There is no abdominal tenderness.   Musculoskeletal:         General: No swelling or tenderness. Normal range of motion.   Skin:     General: Skin is dry.      Capillary Refill: Capillary refill takes more than 3 seconds.      Coloration: Skin is pale.      Findings: No bruising, lesion or rash.   Neurological:      General: No focal deficit present.      Mental Status: He is alert and oriented to person, place, and time.             Significant Labs:   All pertinent labs within the past 24 hours have been reviewed.  CBC:   Recent Labs   Lab 02/03/22  0337   WBC 5.89   HGB 9.4*   HCT 28.5*   *     CMP:   Recent Labs   Lab 02/03/22  0337      K 4.6      CO2 21*   GLU 78   BUN 79*   CREATININE 3.1*   CALCIUM 10.0   PROT 8.0   ALBUMIN 3.7   BILITOT 0.4   ALKPHOS 58   AST 23   ALT 19   ANIONGAP 9   EGFRNONAA 18.0*       Significant Imaging: I have reviewed all pertinent imaging results/findings within the past 24 hours.

## 2022-02-03 NOTE — ASSESSMENT & PLAN NOTE
- patient w/ recent COVID-19 diagnosis  - PCR continues to be COVID-19 (+)  - patient w/ no URI symptoms; not hypoxic     Plan:  - COVID-19 risk score = 9  - will plan for 3 days of Remdesivir

## 2022-02-03 NOTE — H&P
[facility] - Emergency Dept  Hospital Medicine  History & Physical    Patient Name: Dariel Urbina  MRN: [id number]  Patient Class: OP- Observation  Admission Date: 2/3/2022  Attending Physician: Beverley Juarez MD   Primary Care Provider: Devon Langston MD         Patient information was obtained from patient, spouse/SO and ER records.     Subjective:     Principal Problem:Epistaxis    Chief Complaint:   Chief Complaint   Patient presents with    Epistaxis     Pt reports nosebleed since 2200 tonight. Recently seen for same thing. Pt's wife reports pt became very pale and weak. +blood thinners. Pt reports feeling blood go down throat.         HPI: Mr. Urbina is a 82 yo M w/ a hx of anemia, CKD3, mechanical heart valve on warfarin, HTN, and gout presenting to the ED w/ persistent epistaxis. Patient originally presented to [facility] [place] for the same complaint - epistaxis was eventually controlled w/ nebulized TXA. At this time, Hgb was stable and patient was instructed to skip 1 x dose of warfarin due to increased INR (goal 2-3). Since then, nose has been bleeding on and off. His wife states patient began to appear weak and pale which triggered presentation to the ED. Patient denies HA, change in vision, dizziness, light-headedness, syncope, CP, SOB, abdominal pain, hematuria or bloody stools. He does endorse decreased PO intake and ~10 lb weight loss in the past 7 days. Of note, patient tested COVID-19 (+) last week. At the time of diagnosis, patient did not have any URI symptoms but did endorse diarrhea which is now improving.     In the ED, VSS. Patient's epistaxis had spontaneously stopped - he appears dry and pale on examination. Labs significant for Hgb 9.4 (baseline Hgb ~11), Cr 3.1, PT 34.3, and INR 3.4. Patient continues to test COVID-19 (+). Patient received IVFs and was admitted to hospital medicine for further management.       Past Medical History:   Diagnosis Date    Anemia of chronic renal failure,  stage 3 (moderate) 5/27/2015    Anticoagulant long-term use     Atherosclerosis of coronary artery bypass graft of native heart without angina pectoris 9/11/2012    3-27-18 [facility] Two vessel coronary artery disease.   Prosthetic aortic valve.   Porcelain aorta.   Patent LIMA graft.    Bilateral carotid artery disease 2/9/2017    Bleeding from the nose     Bleeding nose 3/21/2018    Cataract     CKD (chronic kidney disease) stage 3, GFR 30-59 ml/min 5/27/2015    Claudication of left lower extremity 9/17/2014    Colon polyp     Encounter for blood transfusion     Gastroesophageal reflux disease without esophagitis 3/19/2018    Gastrointestinal hemorrhage associated with intestinal diverticulosis 4/1/2018    H/O mechanical aortic valve replacement 09/17/2014    History of gout 9/26/2012    Hyperparathyroidism due to renal insufficiency 7/27/2015    Internal hemorrhoid 4/3/2018    Long term current use of anticoagulant therapy 9/26/2012    Mechanical heart valve present     Metabolic acidosis with normal anion gap and bicarbonate losses 3/20/2018    Mixed hyperlipidemia 9/26/2012    NSTEMI (non-ST elevated myocardial infarction) 3/21/2018    Obesity, diabetes, and hypertension syndrome 2/23/2016    PVD (peripheral vascular disease) 9/11/2012    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/2012    Type 2 diabetes mellitus with diabetic peripheral angiopathy without gangrene 5/27/2015    Type 2 diabetes mellitus with stage 3 chronic kidney disease, without long-term current use of insulin 10/2/2013       Past Surgical History:   Procedure Laterality Date    CARDIAC CATHETERIZATION      CARDIAC VALVE REPLACEMENT      CARDIAC VALVE SURGERY      CARPAL TUNNEL RELEASE Right 5/19/2020    Procedure: RELEASE, CARPAL TUNNEL;  Surgeon: Rupesh Norris Jr., MD;  Location[facility];  Service: Plastics;  Laterality: Right;    COLON SURGERY      COLONOSCOPY N/A 3/31/2017    Procedure: COLONOSCOPY;  Surgeon: Bruno SCHAFER  MD Segundo;  Location: [facility] ENDO (4TH FLR);  Service: Endoscopy;  Laterality: N/A;  Patient's wife requesting date.    COLONOSCOPY N/A 4/3/2018    Procedure: COLONOSCOPY;  Surgeon: Bonifacio Pelletier MD;  Location: [facility] (2ND FLR);  Service: Endoscopy;  Laterality: N/A;    COLONOSCOPY N/A 8/13/2018    Procedure: COLONOSCOPY;  Surgeon: Kam Barba MD;  Location: [facility] ENDO (2ND FLR);  Service: Endoscopy;  Laterality: N/A;  2nd floor: PA pressure 49; hx of moderate-severe valve disease     per Coumadin clinic-Patient can hold 5 days with lovenox bridge       ok to schedule per Katarina    CORONARY ANGIOGRAPHY N/A 10/4/2021    Procedure: Left heart cath +/- peripheral angiogram;  Surgeon: Jose Ruiz MD;  Location: [facility] CATH LAB;  Service: Cardiology;  Laterality: N/A;    CORONARY ANGIOPLASTY      CORONARY ARTERY BYPASS GRAFT      CORONARY BYPASS GRAFT ANGIOGRAPHY  10/4/2021    Procedure: Bypass graft study;  Surgeon: Jose Ruiz MD;  Location: [facility] CATH LAB;  Service: Cardiology;;    HERNIA REPAIR      SPINE SURGERY      VASECTOMY         Review of patient's allergies indicates:   Allergen Reactions    Fosinopril      Intolerance- elevates potassium level      Losartan      Intolerance- elevates potassium level       No current facility-administered medications on file prior to encounter.     Current Outpatient Medications on File Prior to Encounter   Medication Sig    allopurinoL (ZYLOPRIM) 100 MG tablet TAKE 1 TABLET EVERY DAY    aspirin (ECOTRIN) 81 MG EC tablet Take 1 tablet (81 mg total) by mouth once daily.    bumetanide (BUMEX) 2 MG tablet Take 2mg Once to twice daily or as directed    carvediloL (COREG) 12.5 MG tablet Take 1 tablet (12.5 mg total) by mouth 2 (two) times daily.    clopidogreL (PLAVIX) 75 mg tablet Take 1 tablet (75 mg total) by mouth once daily.    enoxaparin (LOVENOX) 150 mg/mL Syrg Inject 130mg SUBQ daily    glimepiride (AMARYL) 1 MG tablet Take 1 tablet (1 mg total)  by mouth every morning.    isosorbide mononitrate (IMDUR) 120 MG 24 hr tablet TAKE 1 TABLET EVERY DAY    lisinopriL (PRINIVIL,ZESTRIL) 2.5 MG tablet TAKE 1 TABLET (2.5 MG TOTAL) BY MOUTH ONCE DAILY.    NIFEdipine (PROCARDIA-XL) 60 MG (OSM) 24 hr tablet Take 1 tablet (60 mg total) by mouth once daily.    nitroGLYCERIN (NITROSTAT) 0.4 MG SL tablet Place 1 tablet (0.4 mg total) under the tongue every 5 (five) minutes as needed. As needed for chest pain    omega-3 acid ethyl esters (LOVAZA) 1 gram capsule Take 2 capsules (2 g total) by mouth 2 (two) times daily. (Patient taking differently: Take 2 g by mouth once daily.)    rosuvastatin (CRESTOR) 40 MG Tab TAKE 1 TABLET EVERY EVENING.    warfarin (COUMADIN) 5 MG tablet Take 2 tablets (10mg) by mouth , then 1.5 tablets (7.5mg) by mouth all other days as instructed by [facility].    warfarin (COUMADIN) 5 MG tablet TAKE 1 AND 1/2 TO 2 TABLETS (7.5 TO 10 MG TOTAL) BY  MOUTH DAILY AS DIRECTED BY COUMADIN CLINIC     Family History     Problem Relation (Age of Onset)    Alcohol abuse Father    Diabetes Brother    Heart disease Mother, Father, Brother, Sister    Heart failure Mother, Father, Brother    No Known Problems Sister, Maternal Grandmother, Maternal Grandfather, Paternal Grandmother, Paternal Grandfather, Maternal Aunt, Maternal Uncle, Paternal Aunt, Paternal Uncle        Tobacco Use    Smoking status: Former Smoker     Packs/day: 1.00     Years: 20.00     Pack years: 20.00     Quit date: 1980     Years since quittin.4    Smokeless tobacco: Never Used   Substance and Sexual Activity    Alcohol use: No    Drug use: No    Sexual activity: Not Currently     Review of Systems   Constitutional: Positive for activity change, fatigue and unexpected weight change. Negative for chills and fever.   HENT: Positive for nosebleeds and sinus pressure. Negative for drooling, sinus pain and trouble swallowing.    Eyes: Negative for photophobia and  visual disturbance.   Respiratory: Negative for cough, shortness of breath and wheezing.    Cardiovascular: Negative for chest pain and palpitations.   Gastrointestinal: Positive for diarrhea. Negative for blood in stool, constipation, nausea and vomiting.   Genitourinary: Negative for difficulty urinating, hematuria and urgency.   Musculoskeletal: Negative for back pain and myalgias.   Skin: Negative for color change and rash.   Neurological: Negative for dizziness, syncope, weakness, light-headedness and headaches.   Psychiatric/Behavioral: Negative for agitation and behavioral problems.     Objective:     Vital Signs (Most Recent):  Temp: 98.1 °F (36.7 °C) (02/03/22 0545)  Pulse: 66 (02/03/22 0545)  Resp: 18 (02/03/22 0545)  BP: 126/61 (02/03/22 0545)  SpO2: 98 % (02/03/22 0545) Vital Signs (24h Range):  Temp:  [97.8 °F (36.6 °C)-98.1 °F (36.7 °C)] 98.1 °F (36.7 °C)  Pulse:  [63-66] 66  Resp:  [16-20] 18  SpO2:  [98 %-99 %] 98 %  BP: (125-131)/(57-63) 126/61     Weight: 81.6 kg (180 lb)  Body mass index is 29.05 kg/m².    Physical Exam  Constitutional:       Appearance: Normal appearance. He is not ill-appearing.   HENT:      Head: Normocephalic and atraumatic.      Right Ear: External ear normal.      Left Ear: External ear normal.      Nose:      Comments: Dried blood in bilateral nostrils      Mouth/Throat:      Mouth: Mucous membranes are dry.      Pharynx: Oropharynx is clear.   Eyes:      General: No scleral icterus.     Extraocular Movements: Extraocular movements intact.      Conjunctiva/sclera: Conjunctivae normal.   Cardiovascular:      Rate and Rhythm: Normal rate and regular rhythm.      Heart sounds: Normal heart sounds. No murmur heard.      Pulmonary:      Effort: Pulmonary effort is normal.      Breath sounds: Normal breath sounds.   Abdominal:      General: Bowel sounds are normal. There is no distension.      Palpations: Abdomen is soft. There is no mass.      Tenderness: There is no abdominal  tenderness.   Musculoskeletal:         General: No swelling or tenderness. Normal range of motion.   Skin:     General: Skin is dry.      Capillary Refill: Capillary refill takes more than 3 seconds.      Coloration: Skin is pale.      Findings: No bruising, lesion or rash.   Neurological:      General: No focal deficit present.      Mental Status: He is alert and oriented to person, place, and time.             Significant Labs:   All pertinent labs within the past 24 hours have been reviewed.  CBC:   Recent Labs   Lab 02/03/22  0337   WBC 5.89   HGB 9.4*   HCT 28.5*   *     CMP:   Recent Labs   Lab 02/03/22 0337      K 4.6      CO2 21*   GLU 78   BUN 79*   CREATININE 3.1*   CALCIUM 10.0   PROT 8.0   ALBUMIN 3.7   BILITOT 0.4   ALKPHOS 58   AST 23   ALT 19   ANIONGAP 9   EGFRNONAA 18.0*       Significant Imaging: I have reviewed all pertinent imaging results/findings within the past 24 hours.    Assessment/Plan:     Epistaxis**  Weakness  82 yo M w/ a hx of anemia, CKD3, mechanical heart valve on warfarin, HTN, and gout re-presenting w/ persistent epistaxis associated w/ weakness, decreased PO intake, fatigue, ~10 lb weight loss over 7 days. VSS. Appears dry and pale on examination. Hgb 9.4 (b/l ~11), Cr 3.1 (b/l ~1.9), PT 34.3, and INR 3.4. Patient received IVFs in the ED.     Admitted to hospital medicine for further management of persistent epistaxis in setting of JIM and long term anticoagulation use for mechanical heart valve.     Plan:   - IVFs as needed  - NPO  - ENT consult   - CBC Q8H to monitor Hg  - Transfuse if Hgb <7 or signs of active bleed      Anemia  - likely chronic component of anemia from CKD in addition to acute blood loss anemia from persistent epistaxis   - continue to monitor w/ CBC Q8H  - transfuse if Hgb <7      H/O mechanical aortic valve replacement  - follows Dr. Ruiz outpatient; last seen 12/15/2021   - continue Coumadin  - hold today's dose of Coumadin whilst INR  "is above goal 2-3      Long term current use of anticoagulant therapy  - see aortic valve replacement       Coronary artery disease of bypass graft of native heart with stable angina pectoris  - CAD s/p CABG 3/17/2018   - s/p PCI of LCX 11/30/2021  - hold ASA in the setting of persistent epistaxis      COVID  - patient w/ recent COVID-19 diagnosis  - PCR continues to be COVID-19 (+)  - patient w/ no URI symptoms; not hypoxic     Plan:  - COVID-19 risk score = 9  - will plan for 3 days of Remdesivir       Thrombocytopenia  - platelets 124 on admission  - continue to monitor      Hypertension associated with diabetes  - hold anti-hypertensives in setting of hypotension and JIM   - continue to monitor BP and restart as needed   - continue to monitor BG (goal 140-180) and start LDSSI as needed         Stage 3b chronic kidney disease  - Cr 3.1 on admission  - baseline sCr ~1.9  - likely pre-renal in setting of diarrhea and epistaxis; diarrhea now improved    Plan:  - IVFs as needed  - urine lytes pending  - retroperitoneal U/S pending  - daily BMPs to monitor Cr   - hold nephrotoxic drugs  - renally dose medications       History of gout  - patient states not taking allopurinal  - re-start as needed       Hyperlipidemia associated with type 2 diabetes mellitus  - continue statin       Atherosclerosis of lower extremity with claudication  - hold plavix in the setting of persistent epistaxis       VTE Risk Mitigation (From admission, onward)         Ordered     warfarin tablet 7.5 mg  Every Tues, Thurs, Sat, Sun        "And" Linked Group Details    02/03/22 0652     warfarin (COUMADIN) tablet 5 mg  Every Mon, Wed, Fri        "And" Linked Group Details    02/03/22 0652     IP VTE HIGH RISK PATIENT  Once         02/03/22 0535     Place sequential compression device  Until discontinued         02/03/22 0535     Reason for No Pharmacological VTE Prophylaxis  Once        Question Answer Comment   Reasons: Risk of Bleeding  "   Reasons: Active Bleeding        02/03/22 0535                   Sushma Limon MD  Department of Hospital Medicine   [facility] - Emergency Dept

## 2022-02-03 NOTE — HPI
Mr. Urbina is a 80 yo M w/ a hx of anemia, CKD3, mechanical heart valve on warfarin, HTN, and gout presenting to the ED w/ persistent epistaxis. Patient originally presented to [facility] [place] ED 1/31 for the same complaint - epistaxis was eventually controlled w/ nebulized TXA. At this time, Hgb was stable and patient was instructed to skip 1 x dose of warfarin due to increased INR (goal 2-3). Since then, nose has been bleeding on and off. His wife states patient began to appear weak and pale which triggered presentation to the ED. Patient denies HA, change in vision, dizziness, light-headedness, syncope, CP, SOB, abdominal pain, hematuria or bloody stools. He does endorse decreased PO intake and ~10 lb weight loss in the past 7 days. Of note, patient tested COVID-19 (+) last week. At the time of diagnosis, patient did not have any URI symptoms but did endorse diarrhea which is now improving.     In the ED, VSS. Patient's epistaxis had spontaneously stopped - he appears dry and pale on examination. Labs significant for Hgb 9.4 (baseline Hgb ~11), Cr 3.1, PT 34.3, and INR 3.4. Patient continues to test COVID-19 (+). Patient received IVFs and was admitted to hospital medicine for further management.

## 2022-02-03 NOTE — ASSESSMENT & PLAN NOTE
- Cr 3.1 on admission  - baseline sCr ~1.9  - likely pre-renal in setting of diarrhea and epistaxis; diarrhea now improved    Plan:  - IVFs as needed  - urine lytes pending  - retroperitoneal U/S pending  - daily BMPs to monitor Cr   - hold nephrotoxic drugs  - renally dose medications

## 2022-02-03 NOTE — SUBJECTIVE & OBJECTIVE
Medications:  Continuous Infusions:  Scheduled Meds:   atorvastatin  80 mg Oral Daily    clopidogreL  75 mg Oral Daily    [START ON 2/4/2022] remdesivir infusion  100 mg Intravenous Daily    [START ON 2/4/2022] warfarin  5 mg Oral Every Mon, Wed, Fri    And    [START ON 2/5/2022] warfarin  7.5 mg Oral Every Tues, Thurs, Sat, Sun     PRN Meds:acetaminophen, dextrose 10%, dextrose 10%, glucagon (human recombinant), glucose, glucose, melatonin, naloxone, ondansetron, oxymetazoline, sodium chloride 0.9%, sodium chloride 0.9%     No current facility-administered medications on file prior to encounter.     Current Outpatient Medications on File Prior to Encounter   Medication Sig    allopurinoL (ZYLOPRIM) 100 MG tablet TAKE 1 TABLET EVERY DAY    bumetanide (BUMEX) 2 MG tablet Take 2mg Once to twice daily or as directed (Patient taking differently: Take 2 mg by mouth 2 (two) times daily.)    carvediloL (COREG) 12.5 MG tablet Take 1 tablet (12.5 mg total) by mouth 2 (two) times daily.    clopidogreL (PLAVIX) 75 mg tablet Take 1 tablet (75 mg total) by mouth once daily.    glimepiride (AMARYL) 1 MG tablet Take 1 tablet (1 mg total) by mouth every morning.    isosorbide mononitrate (IMDUR) 120 MG 24 hr tablet TAKE 1 TABLET EVERY DAY    lisinopriL (PRINIVIL,ZESTRIL) 2.5 MG tablet TAKE 1 TABLET (2.5 MG TOTAL) BY MOUTH ONCE DAILY.    loperamide (IMODIUM A-D) 2 mg Tab Take 2 capsules at onset then 1 capsule as needed for diarrhea.    NIFEdipine (PROCARDIA-XL) 60 MG (OSM) 24 hr tablet Take 1 tablet (60 mg total) by mouth once daily.    nitroGLYCERIN (NITROSTAT) 0.4 MG SL tablet Place 1 tablet (0.4 mg total) under the tongue every 5 (five) minutes as needed. As needed for chest pain (Patient taking differently: Place 0.4 mg under the tongue every 5 (five) minutes as needed for Chest pain.)    omega-3 acid ethyl esters (LOVAZA) 1 gram capsule Take 2 capsules (2 g total) by mouth 2 (two) times daily. (Patient taking  differently: Take 1 g by mouth 2 (two) times daily.)    oxymetazoline HCl (AFRIN, OXYMETAZOLINE, NASL) 1 spray by Each Nostril route once as needed (congestion).    rosuvastatin (CRESTOR) 40 MG Tab TAKE 1 TABLET EVERY EVENING.    warfarin (COUMADIN) 5 MG tablet Take 2 tablets (10mg) by mouth Sunday, then 1.5 tablets (7.5mg) by mouth all other days as instructed by [facility]. (Patient taking differently: Take 1 tablet (5 mg) by mouth Monday, Wednesday & Friday then 1.5 tablets (7.5mg) by mouth all other days (Tues, Thurs, Sat & Sun) as instructed by [facility].)    aspirin (ECOTRIN) 81 MG EC tablet Take 1 tablet (81 mg total) by mouth once daily. (Patient not taking: Reported on 2/3/2022)    enoxaparin (LOVENOX) 150 mg/mL Syrg Inject 130mg SUBQ daily (Patient not taking: Reported on 2/3/2022)    [DISCONTINUED] warfarin (COUMADIN) 5 MG tablet TAKE 1 AND 1/2 TO 2 TABLETS (7.5 TO 10 MG TOTAL) BY  MOUTH DAILY AS DIRECTED BY COUMADIN CLINIC       Review of patient's allergies indicates:   Allergen Reactions    Fosinopril      Intolerance- elevates potassium level      Losartan      Intolerance- elevates potassium level       Past Medical History:   Diagnosis Date    Anemia of chronic renal failure, stage 3 (moderate) 5/27/2015    Anticoagulant long-term use     Atherosclerosis of coronary artery bypass graft of native heart without angina pectoris 9/11/2012    3-27-18 [facility] Two vessel coronary artery disease.   Prosthetic aortic valve.   Porcelain aorta.   Patent LIMA graft.    Bilateral carotid artery disease 2/9/2017    Bleeding from the nose     Bleeding nose 3/21/2018    Cataract     CKD (chronic kidney disease) stage 3, GFR 30-59 ml/min 5/27/2015    Claudication of left lower extremity 9/17/2014    Colon polyp     Encounter for blood transfusion     Gastroesophageal reflux disease without esophagitis 3/19/2018    Gastrointestinal hemorrhage associated with intestinal diverticulosis  4/1/2018    H/O mechanical aortic valve replacement 09/17/2014    History of gout 9/26/2012    Hyperparathyroidism due to renal insufficiency 7/27/2015    Internal hemorrhoid 4/3/2018    Long term current use of anticoagulant therapy 9/26/2012    Mechanical heart valve present     Metabolic acidosis with normal anion gap and bicarbonate losses 3/20/2018    Mixed hyperlipidemia 9/26/2012    NSTEMI (non-ST elevated myocardial infarction) 3/21/2018    Obesity, diabetes, and hypertension syndrome 2/23/2016    PVD (peripheral vascular disease) 9/11/2012    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/2012    Type 2 diabetes mellitus with diabetic peripheral angiopathy without gangrene 5/27/2015    Type 2 diabetes mellitus with stage 3 chronic kidney disease, without long-term current use of insulin 10/2/2013     Past Surgical History:   Procedure Laterality Date    CARDIAC CATHETERIZATION      CARDIAC VALVE REPLACEMENT      CARDIAC VALVE SURGERY      CARPAL TUNNEL RELEASE Right 5/19/2020    Procedure: RELEASE, CARPAL TUNNEL;  Surgeon: Rupesh Norris Jr., MD;  Location: [facility];  Service: Plastics;  Laterality: Right;    COLON SURGERY      COLONOSCOPY N/A 3/31/2017    Procedure: COLONOSCOPY;  Surgeon: Bruno Raymond MD;  Location: [facility] (4TH FLR);  Service: Endoscopy;  Laterality: N/A;  Patient's wife requesting date.    COLONOSCOPY N/A 4/3/2018    Procedure: COLONOSCOPY;  Surgeon: Bonifacio Pelletier MD;  Location: [facility] (2ND FLR);  Service: Endoscopy;  Laterality: N/A;    COLONOSCOPY N/A 8/13/2018    Procedure: COLONOSCOPY;  Surgeon: Kam Barba MD;  Location: [facility] ENDO (2ND FLR);  Service: Endoscopy;  Laterality: N/A;  2nd floor: PA pressure 49; hx of moderate-severe valve disease     per Coumadin clinic-Patient can hold 5 days with lovenox bridge       ok to schedule per Katarina    CORONARY ANGIOGRAPHY N/A 10/4/2021    Procedure: Left heart cath +/- peripheral angiogram;  Surgeon: Jose  MD Sara;  Location: [facility] CATH LAB;  Service: Cardiology;  Laterality: N/A;    CORONARY ANGIOPLASTY      CORONARY ARTERY BYPASS GRAFT      CORONARY BYPASS GRAFT ANGIOGRAPHY  10/4/2021    Procedure: Bypass graft study;  Surgeon: Jose Ruiz MD;  Location: [facility] CATH LAB;  Service: Cardiology;;    HERNIA REPAIR      SPINE SURGERY      VASECTOMY       Family History     Problem Relation (Age of Onset)    Alcohol abuse Father    Diabetes Brother    Heart disease Mother, Father, Brother, Sister    Heart failure Mother, Father, Brother    No Known Problems Sister, Maternal Grandmother, Maternal Grandfather, Paternal Grandmother, Paternal Grandfather, Maternal Aunt, Maternal Uncle, Paternal Aunt, Paternal Uncle        Tobacco Use    Smoking status: Former Smoker     Packs/day: 1.00     Years: 20.00     Pack years: 20.00     Quit date: 1980     Years since quittin.4    Smokeless tobacco: Never Used   Substance and Sexual Activity    Alcohol use: No    Drug use: No    Sexual activity: Not Currently     Review of Systems   Constitutional: Negative for chills and fever.   HENT: Positive for nosebleeds. Negative for congestion, ear pain, facial swelling, sore throat, trouble swallowing and voice change.    Eyes: Negative for visual disturbance.   Respiratory: Negative for shortness of breath and stridor.    Cardiovascular: Negative for chest pain.   Gastrointestinal: Negative for abdominal pain, nausea and vomiting.   Musculoskeletal: Negative for neck pain.   Skin: Negative for wound.   Allergic/Immunologic: Negative for immunocompromised state.   Neurological: Negative for dizziness and headaches.   Hematological: Does not bruise/bleed easily.   Psychiatric/Behavioral: Negative for decreased concentration and dysphoric mood. The patient is not nervous/anxious.      Objective:     Vital Signs (Most Recent):  Temp: 97.7 °F (36.5 °C) (22 1130)  Pulse: 76 (22 1130)  Resp: 19 (22  1130)  BP: (!) 123/58 (02/03/22 1045)  SpO2: 96 % (02/03/22 1130) Vital Signs (24h Range):  Temp:  [97.5 °F (36.4 °C)-98.1 °F (36.7 °C)] 97.7 °F (36.5 °C)  Pulse:  [61-78] 76  Resp:  [16-20] 19  SpO2:  [96 %-99 %] 96 %  BP: (115-134)/(57-75) 123/58     Weight: 81.6 kg (180 lb)  Body mass index is 29.05 kg/m².        Physical Exam   Constitutional: Well appearing/communicating. Voice clear. NAD.  Eyes: EOM I Bilaterally  Head/Face: Normocephalic.  Negative paranasal sinus pressure/tenderness.  Salivary glands WNL.  House Brackmann I Bilaterally.  Right Ear: External Auditory Canal WN.  Auricle WNL.  Left Ear: External Auditory Canal WNL. Auricle WNL.  Nose: No gross nasal septal deviation. Inferior Turbinates 2+ bilaterally. No septal perforation. No masses/lesions. External nasal skin without masses/lesions. Bleeding from left middle turbinate head. Middle meatus packed with fibrillar.   Oral Cavity: Gingiva/lips WNL.  FOM Soft, no masses palpated. Oral Tongue mobile. Hard Palate WNL.   Oropharynx: Tonsillar fossa/pharyngeal wall without lesions. Posterior oropharynx WNL without active bleeding.  Soft palate without masses. Midline uvula.   Neck/Lymphatic: No LAD I-VI bilaterally.  No thyromegaly.  No masses noted on exam.  Neuro/Psychiatric: AOx3.  Normal mood and affect.   Respiratory: Normal respiratory effort, no stridor/stertor, no retractions noted.        Significant Labs:  CBC:   Recent Labs   Lab 02/03/22  0935   WBC 4.73   RBC 2.91*   HGB 8.5*   HCT 27.0*   *   MCV 93   MCH 29.2   MCHC 31.5*     CMP:   Recent Labs   Lab 02/03/22  0337 02/03/22  0337 02/03/22  0935   GLU 78   < > 65*   CALCIUM 10.0   < > 9.7   ALBUMIN 3.7  --   --    PROT 8.0  --   --       < > 137   K 4.6   < > 4.3   CO2 21*   < > 19*      < > 109   BUN 79*   < > 70*   CREATININE 3.1*   < > 2.7*   ALKPHOS 58  --   --    ALT 19  --   --    AST 23  --   --    BILITOT 0.4  --   --     < > = values in this interval not  displayed.       Significant Diagnostics:  None

## 2022-02-03 NOTE — ASSESSMENT & PLAN NOTE
- follows Dr. Ruiz outpatient; last seen 12/15/2021   - continue Coumadin  - hold today's dose of Coumadin whilst INR is above goal 2-3

## 2022-02-03 NOTE — ASSESSMENT & PLAN NOTE
- likely chronic component of anemia from CKD in addition to acute blood loss anemia from persistent epistaxis   - continue to monitor w/ CBC Q8H  - transfuse if Hgb <7

## 2022-02-03 NOTE — HOSPITAL COURSE
Patient placed on maintenance IVF for JIM with improvement in sCr. Initiated remdesivir for 3 days given elevated COVID-19 risk score, however patient is w/o URI symptoms and not hypoxic. ENT consulted. He undergone left nares fibrillar packing and bleeding stops Recommends CT sinus w/o contrast given history of recurrent epistaxis. shows no evidence of erosion of significant findings of the source of bleeding, but showed interspersed air occupying the majority the left nasal cavity which is likely from packing material. . Pt started on Afrin and ocean nasal spray. Pt is stable after packing and HG is stable. He will discharged home with ENT F/U.

## 2022-02-03 NOTE — ED NOTES
Pt received to ED bed 17. Pt complaining of epistaxis. No active bleeding at this time. No blood noted to mask. Awaiting MD orders/disposition. Bed rails up x 2 with bed locked in lowest position. Call light in reach. Spouse at bedside. ED eval continues. Pt verbalizes understanding of plan of care.    Blood work sent to lab via tube system.

## 2022-02-03 NOTE — CONSULTS
[facility] - Emergency Dept  Otorhinolaryngology-Head & Neck Surgery  Consult Note    Patient Name: Dariel Urbina  MRN: [id number]  Code Status: Full Code  Admission Date: 2/3/2022  Hospital Length of Stay: 0 days  Attending Physician: Beverley Juarez MD  Primary Care Provider: Devon Langston MD    Patient information was obtained from patient, past medical records, ER records and primary team.     Inpatient consult to ENT  Consult performed by: Francesca Tomas MD  Consult ordered by: Sushma Limon MD        Subjective:     Chief Complaint/Reason for Admission: epistaxis/JIM    History of Present Illness: Mr. Urbina is an 82 y/o male with history of CKD3, mechanical heart valve on warfarin, and CABG on Plavix who presented to the ED with epistaxis. He is currently admitted for JIM to medicine. ENT was consulted for help with epistaxis which has been intermittent. He has a long history of epistaxis for years - I have seen in previously for this in 2018. He reports that it seems to always be left sided. He uses ocean nasal spray at home. He had had nasal packing in the past.           Medications:  Continuous Infusions:  Scheduled Meds:   atorvastatin  80 mg Oral Daily    clopidogreL  75 mg Oral Daily    [START ON 2/4/2022] remdesivir infusion  100 mg Intravenous Daily    [START ON 2/4/2022] warfarin  5 mg Oral Every Mon, Wed, Fri    And    [START ON 2/5/2022] warfarin  7.5 mg Oral Every Tues, Thurs, Sat, Sun     PRN Meds:acetaminophen, dextrose 10%, dextrose 10%, glucagon (human recombinant), glucose, glucose, melatonin, naloxone, ondansetron, oxymetazoline, sodium chloride 0.9%, sodium chloride 0.9%     No current facility-administered medications on file prior to encounter.     Current Outpatient Medications on File Prior to Encounter   Medication Sig    allopurinoL (ZYLOPRIM) 100 MG tablet TAKE 1 TABLET EVERY DAY    bumetanide (BUMEX) 2 MG tablet Take 2mg Once to twice daily or as directed  (Patient taking differently: Take 2 mg by mouth 2 (two) times daily.)    carvediloL (COREG) 12.5 MG tablet Take 1 tablet (12.5 mg total) by mouth 2 (two) times daily.    clopidogreL (PLAVIX) 75 mg tablet Take 1 tablet (75 mg total) by mouth once daily.    glimepiride (AMARYL) 1 MG tablet Take 1 tablet (1 mg total) by mouth every morning.    isosorbide mononitrate (IMDUR) 120 MG 24 hr tablet TAKE 1 TABLET EVERY DAY    lisinopriL (PRINIVIL,ZESTRIL) 2.5 MG tablet TAKE 1 TABLET (2.5 MG TOTAL) BY MOUTH ONCE DAILY.    loperamide (IMODIUM A-D) 2 mg Tab Take 2 capsules at onset then 1 capsule as needed for diarrhea.    NIFEdipine (PROCARDIA-XL) 60 MG (OSM) 24 hr tablet Take 1 tablet (60 mg total) by mouth once daily.    nitroGLYCERIN (NITROSTAT) 0.4 MG SL tablet Place 1 tablet (0.4 mg total) under the tongue every 5 (five) minutes as needed. As needed for chest pain (Patient taking differently: Place 0.4 mg under the tongue every 5 (five) minutes as needed for Chest pain.)    omega-3 acid ethyl esters (LOVAZA) 1 gram capsule Take 2 capsules (2 g total) by mouth 2 (two) times daily. (Patient taking differently: Take 1 g by mouth 2 (two) times daily.)    oxymetazoline HCl (AFRIN, OXYMETAZOLINE, NASL) 1 spray by Each Nostril route once as needed (congestion).    rosuvastatin (CRESTOR) 40 MG Tab TAKE 1 TABLET EVERY EVENING.    warfarin (COUMADIN) 5 MG tablet Take 2 tablets (10mg) by mouth Sunday, then 1.5 tablets (7.5mg) by mouth all other days as instructed by [facility]. (Patient taking differently: Take 1 tablet (5 mg) by mouth Monday, Wednesday & Friday then 1.5 tablets (7.5mg) by mouth all other days (Tues, Thurs, Sat & Sun) as instructed by [facility]in Clinic.)    aspirin (ECOTRIN) 81 MG EC tablet Take 1 tablet (81 mg total) by mouth once daily. (Patient not taking: Reported on 2/3/2022)    enoxaparin (LOVENOX) 150 mg/mL Syrg Inject 130mg SUBQ daily (Patient not taking: Reported on 2/3/2022)     [DISCONTINUED] warfarin (COUMADIN) 5 MG tablet TAKE 1 AND 1/2 TO 2 TABLETS (7.5 TO 10 MG TOTAL) BY  MOUTH DAILY AS DIRECTED BY COUMADIN CLINIC       Review of patient's allergies indicates:   Allergen Reactions    Fosinopril      Intolerance- elevates potassium level      Losartan      Intolerance- elevates potassium level       Past Medical History:   Diagnosis Date    Anemia of chronic renal failure, stage 3 (moderate) 5/27/2015    Anticoagulant long-term use     Atherosclerosis of coronary artery bypass graft of native heart without angina pectoris 9/11/2012    3-27-18 [facility] Two vessel coronary artery disease.   Prosthetic aortic valve.   Porcelain aorta.   Patent LIMA graft.    Bilateral carotid artery disease 2/9/2017    Bleeding from the nose     Bleeding nose 3/21/2018    Cataract     CKD (chronic kidney disease) stage 3, GFR 30-59 ml/min 5/27/2015    Claudication of left lower extremity 9/17/2014    Colon polyp     Encounter for blood transfusion     Gastroesophageal reflux disease without esophagitis 3/19/2018    Gastrointestinal hemorrhage associated with intestinal diverticulosis 4/1/2018    H/O mechanical aortic valve replacement 09/17/2014    History of gout 9/26/2012    Hyperparathyroidism due to renal insufficiency 7/27/2015    Internal hemorrhoid 4/3/2018    Long term current use of anticoagulant therapy 9/26/2012    Mechanical heart valve present     Metabolic acidosis with normal anion gap and bicarbonate losses 3/20/2018    Mixed hyperlipidemia 9/26/2012    NSTEMI (non-ST elevated myocardial infarction) 3/21/2018    Obesity, diabetes, and hypertension syndrome 2/23/2016    PVD (peripheral vascular disease) 9/11/2012    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/2012    Type 2 diabetes mellitus with diabetic peripheral angiopathy without gangrene 5/27/2015    Type 2 diabetes mellitus with stage 3 chronic kidney disease, without long-term current use of insulin 10/2/2013     Past  Surgical History:   Procedure Laterality Date    CARDIAC CATHETERIZATION      CARDIAC VALVE REPLACEMENT      CARDIAC VALVE SURGERY      CARPAL TUNNEL RELEASE Right 5/19/2020    Procedure: RELEASE, CARPAL TUNNEL;  Surgeon: Rupesh Norris Jr., MD;  Location[facility];  Service: Plastics;  Laterality: Right;    COLON SURGERY      COLONOSCOPY N/A 3/31/2017    Procedure: COLONOSCOPY;  Surgeon: Bruno Raymond MD;  Location: [facility] ENDO (4TH FLR);  Service: Endoscopy;  Laterality: N/A;  Patient's wife requesting date.    COLONOSCOPY N/A 4/3/2018    Procedure: COLONOSCOPY;  Surgeon: Bonifacio Pelletier MD;  Location: [facility] ENDO (2ND FLR);  Service: Endoscopy;  Laterality: N/A;    COLONOSCOPY N/A 8/13/2018    Procedure: COLONOSCOPY;  Surgeon: Kam Barba MD;  Location: [facility] ENDO (2ND FLR);  Service: Endoscopy;  Laterality: N/A;  2nd floor: PA pressure 49; hx of moderate-severe valve disease     per Coumadin clinic-Patient can hold 5 days with lovenox bridge       ok to schedule per Katarina    CORONARY ANGIOGRAPHY N/A 10/4/2021    Procedure: Left heart cath +/- peripheral angiogram;  Surgeon: Jsoe Ruiz MD;  Location: [facility] CATH LAB;  Service: Cardiology;  Laterality: N/A;    CORONARY ANGIOPLASTY      CORONARY ARTERY BYPASS GRAFT      CORONARY BYPASS GRAFT ANGIOGRAPHY  10/4/2021    Procedure: Bypass graft study;  Surgeon: Jose Ruiz MD;  Location: [facility] CATH LAB;  Service: Cardiology;;    HERNIA REPAIR      SPINE SURGERY      VASECTOMY       Family History     Problem Relation (Age of Onset)    Alcohol abuse Father    Diabetes Brother    Heart disease Mother, Father, Brother, Sister    Heart failure Mother, Father, Brother    No Known Problems Sister, Maternal Grandmother, Maternal Grandfather, Paternal Grandmother, Paternal Grandfather, Maternal Aunt, Maternal Uncle, Paternal Aunt, Paternal Uncle        Tobacco Use    Smoking status: Former Smoker     Packs/day: 1.00     Years: 20.00     Pack years:  20.00     Quit date: 1980     Years since quittin.4    Smokeless tobacco: Never Used   Substance and Sexual Activity    Alcohol use: No    Drug use: No    Sexual activity: Not Currently     Review of Systems   Constitutional: Negative for chills and fever.   HENT: Positive for nosebleeds. Negative for congestion, ear pain, facial swelling, sore throat, trouble swallowing and voice change.    Eyes: Negative for visual disturbance.   Respiratory: Negative for shortness of breath and stridor.    Cardiovascular: Negative for chest pain.   Gastrointestinal: Negative for abdominal pain, nausea and vomiting.   Musculoskeletal: Negative for neck pain.   Skin: Negative for wound.   Allergic/Immunologic: Negative for immunocompromised state.   Neurological: Negative for dizziness and headaches.   Hematological: Does not bruise/bleed easily.   Psychiatric/Behavioral: Negative for decreased concentration and dysphoric mood. The patient is not nervous/anxious.      Objective:     Vital Signs (Most Recent):  Temp: 97.7 °F (36.5 °C) (22 1130)  Pulse: 76 (22 1130)  Resp: 19 (22 1130)  BP: (!) 123/58 (22 1045)  SpO2: 96 % (22 1130) Vital Signs (24h Range):  Temp:  [97.5 °F (36.4 °C)-98.1 °F (36.7 °C)] 97.7 °F (36.5 °C)  Pulse:  [61-78] 76  Resp:  [16-20] 19  SpO2:  [96 %-99 %] 96 %  BP: (115-134)/(57-75) 123/58     Weight: 81.6 kg (180 lb)  Body mass index is 29.05 kg/m².        Physical Exam   Constitutional: Well appearing/communicating. Voice clear. NAD.  Eyes: EOM I Bilaterally  Head/Face: Normocephalic.  Negative paranasal sinus pressure/tenderness.  Salivary glands WNL.  House Brackmann I Bilaterally.  Right Ear: External Auditory Canal WN.  Auricle WNL.  Left Ear: External Auditory Canal WNL. Auricle WNL.  Nose: No gross nasal septal deviation. Inferior Turbinates 2+ bilaterally. No septal perforation. No masses/lesions. External nasal skin without masses/lesions. Bleeding from left  "middle turbinate head. Middle meatus packed with fibrillar.   Oral Cavity: Gingiva/lips WNL.  FOM Soft, no masses palpated. Oral Tongue mobile. Hard Palate WNL.   Oropharynx: Tonsillar fossa/pharyngeal wall without lesions. Posterior oropharynx WNL without active bleeding.  Soft palate without masses. Midline uvula.   Neck/Lymphatic: No LAD I-VI bilaterally.  No thyromegaly.  No masses noted on exam.  Neuro/Psychiatric: AOx3.  Normal mood and affect.   Respiratory: Normal respiratory effort, no stridor/stertor, no retractions noted.        Significant Labs:  CBC:   Recent Labs   Lab 02/03/22  0935   WBC 4.73   RBC 2.91*   HGB 8.5*   HCT 27.0*   *   MCV 93   MCH 29.2   MCHC 31.5*     CMP:   Recent Labs   Lab 02/03/22  0337 02/03/22  0337 02/03/22  0935   GLU 78   < > 65*   CALCIUM 10.0   < > 9.7   ALBUMIN 3.7  --   --    PROT 8.0  --   --       < > 137   K 4.6   < > 4.3   CO2 21*   < > 19*      < > 109   BUN 79*   < > 70*   CREATININE 3.1*   < > 2.7*   ALKPHOS 58  --   --    ALT 19  --   --    AST 23  --   --    BILITOT 0.4  --   --     < > = values in this interval not displayed.       Significant Diagnostics:  None    Assessment/Plan:     * Epistaxis  81M with long history of left sided epistaxis on coumadin and warfarin presenting with recurrent epistaxis. Controlled with fibrillar packing in the left middle meatus.     -CT sinus w/o contrast ordered (given history of recurrent left epistaxis and unusual location in middle meatus, r/o inverted papilloma vs other pathology)   -recommend ocean nasal spray at least TID   -Bactroban ointment to bilateral nares BID   -Call ENT with questions                 VTE Risk Mitigation (From admission, onward)         Ordered     warfarin (COUMADIN) tablet 7.5 mg  Every Tues, Thurs, Sat, Sun        "And" Linked Group Details    02/03/22 0652     warfarin (COUMADIN) tablet 5 mg  Every Mon, Wed, Fri        "And" Linked Group Details    02/03/22 0652     IP VTE " HIGH RISK PATIENT  Once         02/03/22 0535     Place sequential compression device  Until discontinued         02/03/22 0535     Reason for No Pharmacological VTE Prophylaxis  Once        Question Answer Comment   Reasons: Risk of Bleeding    Reasons: Active Bleeding        02/03/22 0535                Thank you for your consult. I will follow-up with patient. Please contact us if you have any additional questions.    Francesca Tomas MD  Otorhinolaryngology-Head & Neck Surgery  [facility] - Emergency Dept

## 2022-02-03 NOTE — ASSESSMENT & PLAN NOTE
82 yo M w/ a hx of anemia, CKD3, mechanical heart valve on warfarin, HTN, and gout re-presenting w/ persistent epistaxis associated w/ weakness, decreased PO intake, fatigue, ~10 lb weight loss over 7 days. VSS. Appears dry and pale on examination. Hgb 9.4 (b/l ~11), Cr 3.1 (b/l ~1.9), PT 34.3, and INR 3.4. Patient received IVFs in the ED.     Admitted to hospital medicine for further management of persistent epistaxis in setting of JIM and long term anticoagulation use for mechanical heart valve.     Plan:   - IVFs as needed  - NPO  - ENT consult   - CBC Q8H to monitor Hg  - Transfuse if Hgb <7 or signs of active bleed

## 2022-02-03 NOTE — ED NOTES
Assumed care of patient. Patient on continuous cardiac monitor, automatic blood pressure cuff and continuous pulse oximeter. VSS. AAOx3. Pt resting comfortably in stretcher. Respirations even and unlabored. Urinal placed within reach, pt aware needing urine sample. Side rails up and bed in lowest position. Call light in reach. Instructed patient to call staff for assistance with mobility. Will continue to monitor.

## 2022-02-03 NOTE — ASSESSMENT & PLAN NOTE
- hold anti-hypertensives in setting of hypotension and JIM   - continue to monitor BP and restart as needed   - continue to monitor BG (goal 140-180) and start LDSSI as needed

## 2022-02-03 NOTE — ED PROVIDER NOTES
Encounter Date: 2/3/2022       History     Chief Complaint   Patient presents with    Epistaxis     Pt reports nosebleed since 2200 tonight. Recently seen for same thing. Pt's wife reports pt became very pale and weak. +blood thinners. Pt reports feeling blood go down throat.      HPI     Dariel Urbina is an 81 year old male with history of Anemia with Stage 3 CKD, valve replacement on coumadin, who presents to the ED for evaluation of nose bleeding. Patient states around 10pm on 2/2 the patient began to have a nose bleed. States attempted pressure but when it wasn't working he got nervous and wanted to come in for evaluation. Patient states he has history of chronic nose bleeds and has had numerous procedures in the past in an attempt to cauterize any bleeding vessels.  States since his covid 19 diagnosis he has had 2 nose bleeds in the last week and prior to that was doing well for the year.  Patient denies H/A, blurred or double vision, chest pain, shortness of breath, nausea, vomiting, or weakness at this time.     Review of patient's allergies indicates:   Allergen Reactions    Fosinopril      Intolerance- elevates potassium level      Losartan      Intolerance- elevates potassium level     Past Medical History:   Diagnosis Date    Anemia of chronic renal failure, stage 3 (moderate) 5/27/2015    Anticoagulant long-term use     Atherosclerosis of coronary artery bypass graft of native heart without angina pectoris 9/11/2012    3-27-18 [facility] Two vessel coronary artery disease.   Prosthetic aortic valve.   Porcelain aorta.   Patent LIMA graft.    Bilateral carotid artery disease 2/9/2017    Bleeding from the nose     Bleeding nose 3/21/2018    Cataract     CKD (chronic kidney disease) stage 3, GFR 30-59 ml/min 5/27/2015    Claudication of left lower extremity 9/17/2014    Colon polyp     Encounter for blood transfusion     Gastroesophageal reflux disease without esophagitis 3/19/2018     Gastrointestinal hemorrhage associated with intestinal diverticulosis 4/1/2018    H/O mechanical aortic valve replacement 09/17/2014    History of gout 9/26/2012    Hyperparathyroidism due to renal insufficiency 7/27/2015    Internal hemorrhoid 4/3/2018    Long term current use of anticoagulant therapy 9/26/2012    Mechanical heart valve present     Metabolic acidosis with normal anion gap and bicarbonate losses 3/20/2018    Mixed hyperlipidemia 9/26/2012    NSTEMI (non-ST elevated myocardial infarction) 3/21/2018    Obesity, diabetes, and hypertension syndrome 2/23/2016    PVD (peripheral vascular disease) 9/11/2012    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/2012    Type 2 diabetes mellitus with diabetic peripheral angiopathy without gangrene 5/27/2015    Type 2 diabetes mellitus with stage 3 chronic kidney disease, without long-term current use of insulin 10/2/2013     Past Surgical History:   Procedure Laterality Date    CARDIAC CATHETERIZATION      CARDIAC VALVE REPLACEMENT      CARDIAC VALVE SURGERY      CARPAL TUNNEL RELEASE Right 5/19/2020    Procedure: RELEASE, CARPAL TUNNEL;  Surgeon: Rupesh Norris Jr., MD;  Location: [facility];  Service: Plastics;  Laterality: Right;    COLON SURGERY      COLONOSCOPY N/A 3/31/2017    Procedure: COLONOSCOPY;  Surgeon: Bruno Raymond MD;  Location: [facility] (4TH FLR);  Service: Endoscopy;  Laterality: N/A;  Patient's wife requesting date.    COLONOSCOPY N/A 4/3/2018    Procedure: COLONOSCOPY;  Surgeon: Bonifacio Pelletier MD;  Location: [facility] ENDO (2ND FLR);  Service: Endoscopy;  Laterality: N/A;    COLONOSCOPY N/A 8/13/2018    Procedure: COLONOSCOPY;  Surgeon: Kam Barba MD;  Location: [facility] ENDO (2ND FLR);  Service: Endoscopy;  Laterality: N/A;  2nd floor: PA pressure 49; hx of moderate-severe valve disease     per Coumadin clinic-Patient can hold 5 days with lovenox bridge       ok to schedule per Katarina    CORONARY ANGIOGRAPHY N/A 10/4/2021     Procedure: Left heart cath +/- peripheral angiogram;  Surgeon: Jose Ruiz MD;  Location: [facility] CATH LAB;  Service: Cardiology;  Laterality: N/A;    CORONARY ANGIOPLASTY      CORONARY ARTERY BYPASS GRAFT      CORONARY BYPASS GRAFT ANGIOGRAPHY  10/4/2021    Procedure: Bypass graft study;  Surgeon: Jose Ruiz MD;  Location: [facility] CATH LAB;  Service: Cardiology;;    HERNIA REPAIR      SPINE SURGERY      VASECTOMY       Family History   Problem Relation Age of Onset    Heart failure Mother     Heart disease Mother     Heart failure Father     Heart disease Father     Alcohol abuse Father     Heart failure Brother     Heart disease Brother     Diabetes Brother     No Known Problems Sister     No Known Problems Maternal Grandmother     No Known Problems Maternal Grandfather     No Known Problems Paternal Grandmother     No Known Problems Paternal Grandfather     Heart disease Sister     No Known Problems Maternal Aunt     No Known Problems Maternal Uncle     No Known Problems Paternal Aunt     No Known Problems Paternal Uncle     Amblyopia Neg Hx     Blindness Neg Hx     Cancer Neg Hx     Cataracts Neg Hx     Glaucoma Neg Hx     Hypertension Neg Hx     Macular degeneration Neg Hx     Retinal detachment Neg Hx     Strabismus Neg Hx     Stroke Neg Hx     Thyroid disease Neg Hx     Anemia Neg Hx     Arrhythmia Neg Hx     Asthma Neg Hx     Clotting disorder Neg Hx     Fainting Neg Hx     Heart attack Neg Hx     Hyperlipidemia Neg Hx     Atrial Septal Defect Neg Hx     Melanoma Neg Hx      Social History     Tobacco Use    Smoking status: Former Smoker     Packs/day: 1.00     Years: 20.00     Pack years: 20.00     Quit date: 1980     Years since quittin.4    Smokeless tobacco: Never Used   Substance Use Topics    Alcohol use: No    Drug use: No     Review of Systems   Constitutional: Negative for chills, fever, malaise/fatigue and weight loss.   HENT: Positive for  nosebleeds. Negative for hearing loss and tinnitus.    Eyes: Negative for blurred vision and double vision.   Respiratory: Negative for cough, sputum production and shortness of breath.    Cardiovascular: Negative for chest pain.   Gastrointestinal: Negative for abdominal pain, heartburn and nausea.   Genitourinary: Negative for dysuria, frequency and urgency.   Neurological: Negative for dizziness, sensory change and headaches.       Physical Exam     Initial Vitals   BP Pulse Resp Temp SpO2   02/03/22 0317 02/03/22 0316 02/03/22 0316 02/03/22 0316 02/03/22 0316   (!) 125/57 63 16 97.8 °F (36.6 °C) 98 %      MAP       --                Physical Exam    Nursing note and vitals reviewed.  Constitutional: He appears well-developed and well-nourished.   HENT:   Head: Normocephalic and atraumatic.   Right Ear: External ear normal.   Left Ear: External ear normal.   Bleeding on arrival to the ED has stopped. States he can feel a clot in the back of his throat.    Eyes: EOM are normal. Pupils are equal, round, and reactive to light.   Neck:   Normal range of motion.  Cardiovascular: Normal rate.   Pulmonary/Chest: Breath sounds normal. No respiratory distress. He has no wheezes. He has no rales.   Abdominal: Abdomen is soft. He exhibits no distension. There is no abdominal tenderness. There is no rebound.   Musculoskeletal:         General: Normal range of motion.      Cervical back: Normal range of motion.     Neurological: He is alert.   Skin: Skin is warm and dry. Capillary refill takes less than 2 seconds.         ED Course   Procedures  Labs Reviewed   CBC W/ AUTO DIFFERENTIAL - Abnormal; Notable for the following components:       Result Value    RBC 3.12 (*)     Hemoglobin 9.4 (*)     Hematocrit 28.5 (*)     Platelets 124 (*)     All other components within normal limits   COMPREHENSIVE METABOLIC PANEL - Abnormal; Notable for the following components:    CO2 21 (*)     BUN 79 (*)     Creatinine 3.1 (*)     eGFR if  " 20.8 (*)     eGFR if non  18.0 (*)     All other components within normal limits   PROTIME-INR - Abnormal; Notable for the following components:    Prothrombin Time 34.3 (*)     INR 3.4 (*)     All other components within normal limits          Imaging Results    None            Medications   lactated ringers bolus 1,000 mL ( Intravenous Canceled Entry 2/3/22 0547)   sodium chloride 0.9% flush 10 mL (has no administration in time range)   melatonin tablet 6 mg (has no administration in time range)     Medical Decision Making:   Initial Assessment:   Dariel Urbina is an 81 year old male with history of Anemia with Stage 3 CKD, valve replacement on coumadin, who presents to the ED for evaluation of nose bleeding.  ED Management:  Patient present for evaluation of nose bleeding that was controlled on arrival. Of note patient was seen a few days prior following a nose bleed in which the patient "lost a lot of blood" according to the patients wife. Today on his labs patient was found to have an JIM likely from the blood loss over the last 2 days. Started a liter of fluids and contacted Medicine for admission. Patient and family states they are good with the plan for admission. Case discussed with Dr. Maldonado who was present for all aspects of patient care.     Gokul Pruett MD  [facility] IM/EM PGY 3  Emergency Medicine                  ED Course as of 02/03/22 0517   Thu Feb 03, 2022   0357 Hemoglobin(!): 9.4 [CH]   0428 Creatinine(!): 3.1  This could be in the setting of blood loss over the last few days. Will give fluid and contact medicine for admission for JIM.  [CH]   0503 Spoke with medicine and will admit the patient for JIM. Spoke with the patient family and they are also good with being admitted for continued monitoring and evaluation.   [CH]      ED Course User Index  [CH] Gokul Purett MD             Clinical Impression:   Final diagnoses:  [R53.1] Weakness  [R04.0] " Epistaxis (Primary)          ED Disposition Condition    Observation               oGkul Pruett MD  Resident  02/03/22 0563

## 2022-02-03 NOTE — ASSESSMENT & PLAN NOTE
81M with long history of left sided epistaxis on coumadin and warfarin presenting with recurrent epistaxis. Controlled with fibrillar packing in the left middle meatus.     -CT sinus w/o contrast ordered (given history of recurrent left epistaxis and unusual location in middle meatus, r/o inverted papilloma vs other pathology)   -recommend ocean nasal spray at least TID   -Bactroban ointment to bilateral nares BID   -Call ENT with questions

## 2022-02-03 NOTE — PHARMACY MED REC
"Admission Medication History     The home medication history was taken by Selvin Miranda.    You may go to "Admission" then "Reconcile Home Medications" tabs to review and/or act upon these items.      The home medication list has been updated by the Pharmacy department.    Please read ALL comments highlighted in yellow.    Please address this information as you see fit.     Feel free to contact us if you have any questions or require assistance.      Medications listed below were obtained from: Patient/family     PTA Medications   Medication Sig    allopurinoL (ZYLOPRIM) 100 MG tablet TAKE 1 TABLET BY MOUTH EVERY DAY.    bumetanide (BUMEX) 2 MG tablet Take 2 mg by mouth 2 (two) times daily.    carvediloL (COREG) 12.5 MG tablet Take 1 tablet (12.5 mg total) by mouth 2 (two) times daily.    clopidogreL (PLAVIX) 75 mg tablet Take 1 tablet (75 mg total) by mouth once daily.    glimepiride (AMARYL) 1 MG tablet Take 1 tablet (1 mg total) by mouth every morning.    isosorbide mononitrate (IMDUR) 120 MG 24 hr tablet TAKE 1 TABLET BY MOUTH EVERY DAY.    lisinopriL (PRINIVIL,ZESTRIL) 2.5 MG tablet TAKE 1 TABLET (2.5 MG TOTAL) BY MOUTH ONCE DAILY.    loperamide (IMODIUM A-D) 2 mg Tab Take 2 capsules at onset and 1 capsule as needed for diarrhea.    NIFEdipine (PROCARDIA-XL) 60 MG (OSM) 24 hr tablet Take 1 tablet (60 mg total) by mouth once daily.    nitroGLYCERIN (NITROSTAT) 0.4 MG SL tablet Place 1 tablet (0.4 mg) under the tongue every 5 (five) minutes as needed for Chest pain.    omega-3 acid ethyl esters (LOVAZA) 1 gram capsule Take 1 g by mouth 2 (two) times daily.    oxymetazoline HCl (AFRIN, OXYMETAZOLINE, NASL) 1 spray by Each Nostril route once as needed (congestion).    rosuvastatin (CRESTOR) 40 MG Tab TAKE 1 TABLET BY MOUTH EVERY EVENING.    warfarin (COUMADIN) 5 MG tablet Take 1 tablet (5 mg) by mouth Monday, Wednesday & Friday then 1.5 tablets (7.5mg) by mouth all other days (Tues, Thurs, Sat & Sun) " as instructed by [facility]in Clinic.       Potential issues to be addressed PRIOR TO DISCHARGE  Patient reported not taking the following medications: (ASPIRIN, ENOXAPARIN). These medications remain on the home medication list. Please address accordingly.     Selvin Miranda CPhT  EXT [phone]                    .

## 2022-02-03 NOTE — ASSESSMENT & PLAN NOTE
- CAD s/p CABG 3/17/2018   - s/p PCI of LCX 11/30/2021  - hold ASA in the setting of persistent epistaxis

## 2022-02-03 NOTE — HPI
Mr. Urbina is an 82 y/o male with history of CKD3, mechanical heart valve on warfarin, and CABG on Plavix who presented to the ED with epistaxis. He is currently admitted for IJM to medicine. ENT was consulted for help with epistaxis which has been intermittent. He has a long history of epistaxis for years - I have seen in previously for this in 2018. He reports that it seems to always be left sided. He uses ocean nasal spray at home. He had had nasal packing in the past.

## 2022-02-04 VITALS
TEMPERATURE: 98 F | DIASTOLIC BLOOD PRESSURE: 66 MMHG | BODY MASS INDEX: 28.93 KG/M2 | SYSTOLIC BLOOD PRESSURE: 150 MMHG | OXYGEN SATURATION: 92 % | HEIGHT: 66 IN | WEIGHT: 180 LBS | RESPIRATION RATE: 16 BRPM | HEART RATE: 64 BPM

## 2022-02-04 LAB
ALBUMIN SERPL BCP-MCNC: 3.4 G/DL (ref 3.5–5.2)
ALP SERPL-CCNC: 46 U/L (ref 55–135)
ALT SERPL W/O P-5'-P-CCNC: 18 U/L (ref 10–44)
ANION GAP SERPL CALC-SCNC: 6 MMOL/L (ref 8–16)
AST SERPL-CCNC: 24 U/L (ref 10–40)
BASOPHILS # BLD AUTO: 0.04 K/UL (ref 0–0.2)
BASOPHILS NFR BLD: 0.8 % (ref 0–1.9)
BILIRUB SERPL-MCNC: 0.4 MG/DL (ref 0.1–1)
BUN SERPL-MCNC: 57 MG/DL (ref 8–23)
CALCIUM SERPL-MCNC: 9.6 MG/DL (ref 8.7–10.5)
CHLORIDE SERPL-SCNC: 106 MMOL/L (ref 95–110)
CO2 SERPL-SCNC: 22 MMOL/L (ref 23–29)
CREAT SERPL-MCNC: 2 MG/DL (ref 0.5–1.4)
DIFFERENTIAL METHOD: ABNORMAL
EOSINOPHIL # BLD AUTO: 0.2 K/UL (ref 0–0.5)
EOSINOPHIL NFR BLD: 3.6 % (ref 0–8)
ERYTHROCYTE [DISTWIDTH] IN BLOOD BY AUTOMATED COUNT: 13.9 % (ref 11.5–14.5)
EST. GFR  (AFRICAN AMERICAN): 35.4 ML/MIN/1.73 M^2
EST. GFR  (NON AFRICAN AMERICAN): 30.6 ML/MIN/1.73 M^2
GLUCOSE SERPL-MCNC: 59 MG/DL (ref 70–110)
HCT VFR BLD AUTO: 28.8 % (ref 40–54)
HGB BLD-MCNC: 9.3 G/DL (ref 14–18)
IMM GRANULOCYTES # BLD AUTO: 0.01 K/UL (ref 0–0.04)
IMM GRANULOCYTES NFR BLD AUTO: 0.2 % (ref 0–0.5)
INR PPP: 2.7 (ref 0.8–1.2)
LYMPHOCYTES # BLD AUTO: 1.4 K/UL (ref 1–4.8)
LYMPHOCYTES NFR BLD: 27.9 % (ref 18–48)
MAGNESIUM SERPL-MCNC: 1.8 MG/DL (ref 1.6–2.6)
MCH RBC QN AUTO: 29.7 PG (ref 27–31)
MCHC RBC AUTO-ENTMCNC: 32.3 G/DL (ref 32–36)
MCV RBC AUTO: 92 FL (ref 82–98)
MONOCYTES # BLD AUTO: 0.6 K/UL (ref 0.3–1)
MONOCYTES NFR BLD: 12 % (ref 4–15)
NEUTROPHILS # BLD AUTO: 2.8 K/UL (ref 1.8–7.7)
NEUTROPHILS NFR BLD: 55.5 % (ref 38–73)
NRBC BLD-RTO: 0 /100 WBC
PHOSPHATE SERPL-MCNC: 2.8 MG/DL (ref 2.7–4.5)
PLATELET # BLD AUTO: 124 K/UL (ref 150–450)
PMV BLD AUTO: 11.2 FL (ref 9.2–12.9)
POTASSIUM SERPL-SCNC: 4.8 MMOL/L (ref 3.5–5.1)
PROT SERPL-MCNC: 7.1 G/DL (ref 6–8.4)
PROTHROMBIN TIME: 26.5 SEC (ref 9–12.5)
RBC # BLD AUTO: 3.13 M/UL (ref 4.6–6.2)
SODIUM SERPL-SCNC: 134 MMOL/L (ref 136–145)
WBC # BLD AUTO: 4.98 K/UL (ref 3.9–12.7)

## 2022-02-04 PROCEDURE — 96361 HYDRATE IV INFUSION ADD-ON: CPT

## 2022-02-04 PROCEDURE — 96366 THER/PROPH/DIAG IV INF ADDON: CPT

## 2022-02-04 PROCEDURE — 36415 COLL VENOUS BLD VENIPUNCTURE: CPT | Mod: HCNC

## 2022-02-04 PROCEDURE — 63600175 PHARM REV CODE 636 W HCPCS: Mod: HCNC

## 2022-02-04 PROCEDURE — 25000003 PHARM REV CODE 250: Mod: HCNC | Performed by: STUDENT IN AN ORGANIZED HEALTH CARE EDUCATION/TRAINING PROGRAM

## 2022-02-04 PROCEDURE — 99217 PR OBSERVATION CARE DISCHARGE: CPT | Mod: HCNC,GC,, | Performed by: INTERNAL MEDICINE

## 2022-02-04 PROCEDURE — 80053 COMPREHEN METABOLIC PANEL: CPT | Mod: HCNC

## 2022-02-04 PROCEDURE — 84100 ASSAY OF PHOSPHORUS: CPT | Mod: HCNC

## 2022-02-04 PROCEDURE — 36415 COLL VENOUS BLD VENIPUNCTURE: CPT | Mod: HCNC | Performed by: INTERNAL MEDICINE

## 2022-02-04 PROCEDURE — 85610 PROTHROMBIN TIME: CPT | Mod: HCNC | Performed by: INTERNAL MEDICINE

## 2022-02-04 PROCEDURE — 85025 COMPLETE CBC W/AUTO DIFF WBC: CPT | Mod: HCNC

## 2022-02-04 PROCEDURE — G0378 HOSPITAL OBSERVATION PER HR: HCPCS | Mod: HCNC

## 2022-02-04 PROCEDURE — 25000003 PHARM REV CODE 250: Mod: HCNC

## 2022-02-04 PROCEDURE — 99217 PR OBSERVATION CARE DISCHARGE: ICD-10-PCS | Mod: HCNC,GC,, | Performed by: INTERNAL MEDICINE

## 2022-02-04 PROCEDURE — 83735 ASSAY OF MAGNESIUM: CPT | Mod: HCNC

## 2022-02-04 RX ORDER — NIFEDIPINE 30 MG/1
60 TABLET, EXTENDED RELEASE ORAL DAILY
Status: DISCONTINUED | OUTPATIENT
Start: 2022-02-04 | End: 2022-02-04 | Stop reason: HOSPADM

## 2022-02-04 RX ORDER — CARVEDILOL 12.5 MG/1
12.5 TABLET ORAL 2 TIMES DAILY
Status: DISCONTINUED | OUTPATIENT
Start: 2022-02-04 | End: 2022-02-04 | Stop reason: HOSPADM

## 2022-02-04 RX ORDER — OXYMETAZOLINE HCL 0.05 %
2 SPRAY, NON-AEROSOL (ML) NASAL EVERY 6 HOURS PRN
Qty: 30 ML | Refills: 0 | Status: SHIPPED | OUTPATIENT
Start: 2022-02-04 | End: 2022-02-07

## 2022-02-04 RX ADMIN — REMDESIVIR 100 MG: 100 INJECTION, POWDER, LYOPHILIZED, FOR SOLUTION INTRAVENOUS at 09:02

## 2022-02-04 RX ADMIN — MUPIROCIN: 20 OINTMENT TOPICAL at 09:02

## 2022-02-04 RX ADMIN — CARVEDILOL 12.5 MG: 12.5 TABLET, FILM COATED ORAL at 09:02

## 2022-02-04 RX ADMIN — CLOPIDOGREL 75 MG: 75 TABLET, FILM COATED ORAL at 09:02

## 2022-02-04 RX ADMIN — ATORVASTATIN CALCIUM 80 MG: 20 TABLET, FILM COATED ORAL at 09:02

## 2022-02-04 RX ADMIN — NIFEDIPINE 60 MG: 30 TABLET, FILM COATED, EXTENDED RELEASE ORAL at 09:02

## 2022-02-04 RX ADMIN — NASAL 1 SPRAY: 6.5 SPRAY NASAL at 09:02

## 2022-02-04 NOTE — PLAN OF CARE
Pt AAO X 4; able to express needs.  VSS.  No c/o pain.  Safety maintained.  Bed in low position,  call  light in reach.

## 2022-02-04 NOTE — PLAN OF CARE
Patient is being d/c today with no Social Service needs identified at this time.      02/04/22 1110   Post-Acute Status   Post-Acute Authorization Other   Other Status No Post-Acute Service Needs     January Pena LMSW   - [facility]  Ext. [phone]

## 2022-02-04 NOTE — PLAN OF CARE
Problem: Adult Inpatient Plan of Care  Goal: Plan of Care Review  Outcome: Ongoing, Not Progressing  Goal: Optimal Comfort and Wellbeing  Outcome: Ongoing, Not Progressing     Problem: Bleeding (Thrombolytic Therapy)  Goal: Absence of Bleeding  Outcome: Ongoing, Not Progressing     AAOx4. 02 sats wnl to ra. Denies discomfort. No evidence of bleed to nares at present time.

## 2022-02-04 NOTE — DISCHARGE SUMMARY
[facility] - Intensive Care ([place] [address])  [facility] Medicine  Discharge Summary      Patient Name: Dariel Urbina  MRN: [id number]  Patient Class: OP- Observation  Admission Date: 2/3/2022  Hospital Length of Stay: 0 days  Discharge Date and Time:  02/04/2022 2:22 PM  Attending Physician: Beverley Juarez MD   Discharging Provider: Lalo Booth MD  Primary Care Provider: Devon Langston MD  Hospital Medicine Team: [facility] HOSP MED 4 Lalo Booth MD    HPI:   Mr. Urbina is a 80 yo M w/ a hx of anemia, CKD3, mechanical heart valve on warfarin, HTN, and gout presenting to the ED w/ persistent epistaxis. Patient originally presented to [facility] ED 1/31 for the same complaint - epistaxis was eventually controlled w/ nebulized TXA. At this time, Hgb was stable and patient was instructed to skip 1 x dose of warfarin due to increased INR (goal 2-3). Since then, nose has been bleeding on and off. His wife states patient began to appear weak and pale which triggered presentation to the ED. Patient denies HA, change in vision, dizziness, light-headedness, syncope, CP, SOB, abdominal pain, hematuria or bloody stools. He does endorse decreased PO intake and ~10 lb weight loss in the past 7 days. Of note, patient tested COVID-19 (+) last week. At the time of diagnosis, patient did not have any URI symptoms but did endorse diarrhea which is now improving.     In the ED, VSS. Patient's epistaxis had spontaneously stopped - he appears dry and pale on examination. Labs significant for Hgb 9.4 (baseline Hgb ~11), Cr 3.1, PT 34.3, and INR 3.4. Patient continues to test COVID-19 (+). Patient received IVFs and was admitted to hospital medicine for further management.       * No surgery found *      Hospital Course:   Patient placed on maintenance IVF for JIM with improvement in sCr. Initiated remdesivir for 3 days given elevated COVID-19 risk score, however patient is w/o URI symptoms and not hypoxic. ENT consulted. He undergone left nares  fibrillar packing and bleeding stops Recommends CT sinus w/o contrast given history of recurrent epistaxis. shows no evidence of erosion of significant findings of the source of bleeding, but showed interspersed air occupying the majority the left nasal cavity which is likely from packing material. . Pt started on Afrin and ocean nasal spray. Pt is stable after packing and HG is stable. He will discharged home with ENT F/U.      Physical Exam  Constitutional:       Appearance: Normal appearance. He is not ill-appearing.   HENT:      Head: Normocephalic and atraumatic.      Right Ear: External ear normal.      Left Ear: External ear normal.      Nose:      Comments: left nares with packing material but no bleeding from either nares.     Mouth/Throat:      Mouth: Mucous membranes are dry.      Pharynx: Oropharynx is clear.   Eyes:      General: No scleral icterus.     Extraocular Movements: Extraocular movements intact.      Conjunctiva/sclera: Conjunctivae normal.   Cardiovascular:      Rate and Rhythm: Normal rate and regular rhythm.      Heart sounds: Normal heart sounds. No murmur heard.       Pulmonary:      Effort: Pulmonary effort is normal.      Breath sounds: Normal breath sounds.   Abdominal:      General: Bowel sounds are normal. There is no distension.      Palpations: Abdomen is soft. There is no mass.      Tenderness: There is no abdominal tenderness.   Musculoskeletal:         General: No swelling or tenderness. Normal range of motion.   Skin:     General: Skin is dry.      Capillary Refill: 2sec          Findings: No bruising, lesion or rash.   Neurological:      General: No focal deficit present.      Mental Status: He is alert and oriented to person, place, and time.       Consults:   Consults (From admission, onward)        Status Ordering Provider     Inpatient consult to ENT  Once        Provider:  (Not yet assigned)    NILSON Linda          No new Assessment & Plan notes have been  filed under this hospital service since the last note was generated.  Service: Hospital Medicine    Final Active Diagnoses:    Diagnosis Date Noted POA    PRINCIPAL PROBLEM:  Epistaxis [R04.0] 03/21/2018 Unknown    COVID [U07.1] 02/03/2022 Unknown    Weakness [R53.1] 02/03/2022 Unknown    Anemia [D64.9] 02/03/2022 Unknown    Thrombocytopenia [D69.6] 11/10/2021 Yes    Hypertension associated with diabetes [E11.59, I15.2] 09/25/2019 Yes    Stage 3b chronic kidney disease [N18.32] 05/27/2015 Yes     Chronic    H/O mechanical aortic valve replacement [Z95.2] 09/17/2014 Not Applicable    History of gout [Z87.39] 09/26/2012 Yes    Hyperlipidemia associated with type 2 diabetes mellitus [E11.69, E78.5] 09/26/2012 Yes    Long term current use of anticoagulant therapy [Z79.01] 09/26/2012 Not Applicable    Atherosclerosis of lower extremity with claudication [I70.219] 09/11/2012 Yes    Coronary artery disease of bypass graft of native heart with stable angina pectoris [I25.708] 09/11/2012 Yes      Problems Resolved During this Admission:       Discharged Condition: good    Disposition: Home or Self Care    Follow Up:   Follow-up Information     [facility] Sched[facility].    Why: Please schdule appointment with Lab for BMP to be drawn in one week before your MD appointment with Dr. Langston.  Contact information:  [phone]                     Patient Instructions:      BASIC METABOLIC PANEL   Standing Status: Future Standing Exp. Date: 04/05/23     Ambulatory referral/consult to ENT   Standing Status: Future   Referral Priority: Routine Referral Type: Consultation   Referral Reason: Specialty Services Required   Requested Specialty: Otolaryngology   Number of Visits Requested: 1     Ambulatory referral/consult to Internal Medicine   Standing Status: Future   Referral Priority: Routine Referral Type: Consultation   Referral Reason: Specialty Services Required   Requested Specialty: Internal Medicine   Number  of Visits Requested: 1     Diet Cardiac     Notify your health care provider if you experience any of the following:  temperature >100.4     Notify your health care provider if you experience any of the following:  persistent nausea and vomiting or diarrhea     Notify your health care provider if you experience any of the following:  severe uncontrolled pain     Notify your health care provider if you experience any of the following:  redness, tenderness, or signs of infection (pain, swelling, redness, odor or green/yellow discharge around incision site)     Notify your health care provider if you experience any of the following:  difficulty breathing or increased cough     Notify your health care provider if you experience any of the following:  severe persistent headache     Notify your health care provider if you experience any of the following:  worsening rash     Notify your health care provider if you experience any of the following:  persistent dizziness, light-headedness, or visual disturbances     Notify your health care provider if you experience any of the following:  increased confusion or weakness     Activity as tolerated       Significant Diagnostic Studies: Labs: All labs within the past 24 hours have been reviewed    Pending Diagnostic Studies:     Procedure Component Value Units Date/Time    CBC with Automated Differential [[id number]]     Order Status: Sent Lab Status: No result     Specimen: Blood          Medications:  Reconciled Home Medications:      Medication List      CHANGE how you take these medications    * oxymetazoline 0.05 % nasal spray  Commonly known as: AFRIN  2 sprays by Nasal route every 6 (six) hours as needed (Nose bleed).  What changed: You were already taking a medication with the same name, and this prescription was added. Make sure you understand how and when to take each.     * AFRIN (OXYMETAZOLINE) NASL  1 spray by Each Nostril route once as needed (congestion).  What  changed: Another medication with the same name was added. Make sure you understand how and when to take each.     bumetanide 2 MG tablet  Commonly known as: BUMEX  Take 2mg Once to twice daily or as directed  What changed:   · how much to take  · how to take this  · when to take this  · additional instructions     nitroGLYCERIN 0.4 MG SL tablet  Commonly known as: NITROSTAT  Place 1 tablet (0.4 mg total) under the tongue every 5 (five) minutes as needed. As needed for chest pain  What changed:   · reasons to take this  · additional instructions     omega-3 acid ethyl esters 1 gram capsule  Commonly known as: LOVAZA  Take 2 capsules (2 g total) by mouth 2 (two) times daily.  What changed: how much to take     warfarin 5 MG tablet  Commonly known as: COUMADIN  Take 2 tablets (10mg) by mouth Sunday, then 1.5 tablets (7.5mg) by mouth all other days as instructed by [facility].  What changed: additional instructions         * This list has 2 medication(s) that are the same as other medications prescribed for you. Read the directions carefully, and ask your doctor or other care provider to review them with you.            CONTINUE taking these medications    allopurinoL 100 MG tablet  Commonly known as: ZYLOPRIM  TAKE 1 TABLET EVERY DAY     carvediloL 12.5 MG tablet  Commonly known as: COREG  Take 1 tablet (12.5 mg total) by mouth 2 (two) times daily.     clopidogreL 75 mg tablet  Commonly known as: PLAVIX  Take 1 tablet (75 mg total) by mouth once daily.     glimepiride 1 MG tablet  Commonly known as: AMARYL  Take 1 tablet (1 mg total) by mouth every morning.     isosorbide mononitrate 120 MG 24 hr tablet  Commonly known as: IMDUR  TAKE 1 TABLET EVERY DAY     lisinopriL 2.5 MG tablet  Commonly known as: PRINIVIL,ZESTRIL  TAKE 1 TABLET (2.5 MG TOTAL) BY MOUTH ONCE DAILY.     loperamide 2 mg Tab  Commonly known as: IMODIUM A-D  Take 2 capsules at onset then 1 capsule as needed for diarrhea.     NIFEdipine 60 MG (OSM)  24 hr tablet  Commonly known as: PROCARDIA-XL  Take 1 tablet (60 mg total) by mouth once daily.     rosuvastatin 40 MG Tab  Commonly known as: CRESTOR  TAKE 1 TABLET EVERY EVENING.        STOP taking these medications    aspirin 81 MG EC tablet  Commonly known as: ECOTRIN            Indwelling Lines/Drains at time of discharge:   Lines/Drains/Airways     None                 Time spent on the discharge of patient: 45 minutes         Lalo Booth MD  Department of Hospital Medicine  [facility] - Intensive Care ([place])

## 2022-02-04 NOTE — NURSING
AAOx4. Discharged home by personal transportation and all belongings retrieved.  Educated on discharge, medications, and follow up appts; voiced understanding. Spouse arranged to  prescription medications at pharmacy.

## 2022-02-04 NOTE — PLAN OF CARE
[facility] - Int[facility] ([facility])  Discharge Assessment    Primary Care Provider: Devon Langston MD     Discharge Assessment (most recent)     BRIEF DISCHARGE ASSESSMENT - 02/04/22 1117        Discharge Planning    Assessment Type Discharge Planning Brief Assessment     Resource/Environmental Concerns none     Support Systems Spouse/significant other     Equipment Currently Used at Home none     Current Living Arrangements home/apartment/condo     Patient/Family Anticipates Transition to home with family     Patient/Family Anticipated Services at Transition none     DME Needed Upon Discharge  none     Discharge Plan A Home with family     Discharge Plan B Home with family;Home Health

## 2022-02-04 NOTE — PROGRESS NOTES
2/4/22-pt admitted 1/31 & 2/3 for epistaxis.  Pt could be supratherapeutic due to change in health. Will decrease dosing per calendar & re-assess Wednesday.

## 2022-02-04 NOTE — PROGRESS NOTES
Wife verified correct coumadin dose, wife given coumadin dosing instruction. Advised to call office on Monday if patient is not discharged.

## 2022-02-07 NOTE — PLAN OF CARE
[facility] - Intensive Care ([facility])  Discharge Final Note    Primary Care Provider: Devon Langston MD    Expected Discharge Date: 2/4/2022    Final Discharge Note (most recent)       Final Note - 02/07/22 0713          Final Note    Assessment Type Final Discharge Note     Anticipated Discharge Disposition Home or Self Care                   Future Appointments   Date Time Provider Department Center   2/9/2022  8:00 AM LAB, SAME DAY [facility] LAB [facility]   2/9/2022  8:20 AM Jose Ruiz MD [facility] CARDVAL [facility]   2/10/2022  8:40 AM Devon Langston Jr., MD [facility] IM [facility]   2/17/2022  9:45 AM Bhavik Patel MD [facility] ENT [facility]         Important Message from Medicare             Contact Info       [facility]    [phone]       Next Steps: Follow up    Instructions: Please schdule appointment with Lab for BMP to be drawn in one week before your MD appointment with Dr. Langston.

## 2022-02-08 ENCOUNTER — TELEPHONE (OUTPATIENT)
Dept: INTERNAL MEDICINE | Facility: CLINIC | Age: 82
End: 2022-02-08
Payer: MEDICARE

## 2022-02-08 NOTE — TELEPHONE ENCOUNTER
----- Message from Tasia Berumen sent at 2/8/2022 10:00 AM CST -----  Contact: [phone] Ameena  Patient would like to get medical advice.  Symptoms (please be specific):  nose bleed non stop   How long have you had these symptoms:   Would you like a call back, or a response through your [facility] portal?:   call back   Pharmacy name and phone # (copy from chart):    Comments:

## 2022-02-08 NOTE — PROGRESS NOTES
Wife called 02/08/2022 to inform us patient had (Nose Bleed) started @ 11am. She sais he has cotton in nose to stop. She is concern should he take warfarin tonight or what? Please advise

## 2022-02-08 NOTE — PROGRESS NOTES
2/8/22-spoke w/ pt's wife.  Dosing has been lowered throughout the week since hospitalization.  Will lower further since pt's nose bled this morning.  Wife states it is under control now.  They have an appt tomorrow w/ ENT. She has been re-advised to use Afrin nasal spray, apply pressure for recurrence.

## 2022-02-09 ENCOUNTER — LAB VISIT (OUTPATIENT)
Dept: LAB | Facility: HOSPITAL | Age: 82
End: 2022-02-09
Attending: INTERNAL MEDICINE
Payer: MEDICARE

## 2022-02-09 ENCOUNTER — OFFICE VISIT (OUTPATIENT)
Dept: CARDIOLOGY | Facility: CLINIC | Age: 82
End: 2022-02-09
Payer: MEDICARE

## 2022-02-09 ENCOUNTER — ANTI-COAG VISIT (OUTPATIENT)
Dept: CARDIOLOGY | Facility: CLINIC | Age: 82
End: 2022-02-09
Payer: MEDICARE

## 2022-02-09 ENCOUNTER — OFFICE VISIT (OUTPATIENT)
Dept: OTOLARYNGOLOGY | Facility: CLINIC | Age: 82
End: 2022-02-09
Payer: MEDICARE

## 2022-02-09 VITALS — BODY MASS INDEX: 29.76 KG/M2 | HEIGHT: 66 IN | WEIGHT: 185.19 LBS

## 2022-02-09 VITALS
OXYGEN SATURATION: 100 % | HEIGHT: 66 IN | SYSTOLIC BLOOD PRESSURE: 96 MMHG | HEART RATE: 56 BPM | BODY MASS INDEX: 29.76 KG/M2 | DIASTOLIC BLOOD PRESSURE: 53 MMHG | WEIGHT: 185.19 LBS

## 2022-02-09 DIAGNOSIS — I25.708 CORONARY ARTERY DISEASE OF BYPASS GRAFT OF NATIVE HEART WITH STABLE ANGINA PECTORIS: ICD-10-CM

## 2022-02-09 DIAGNOSIS — I15.2 HYPERTENSION ASSOCIATED WITH DIABETES: ICD-10-CM

## 2022-02-09 DIAGNOSIS — E78.5 HYPERLIPIDEMIA ASSOCIATED WITH TYPE 2 DIABETES MELLITUS: ICD-10-CM

## 2022-02-09 DIAGNOSIS — D64.9 ANEMIA, UNSPECIFIED TYPE: ICD-10-CM

## 2022-02-09 DIAGNOSIS — N18.32 STAGE 3B CHRONIC KIDNEY DISEASE: ICD-10-CM

## 2022-02-09 DIAGNOSIS — Z79.01 LONG TERM CURRENT USE OF ANTICOAGULANT THERAPY: ICD-10-CM

## 2022-02-09 DIAGNOSIS — I73.9 CLAUDICATION IN PERIPHERAL VASCULAR DISEASE: Primary | ICD-10-CM

## 2022-02-09 DIAGNOSIS — Z79.01 LONG TERM CURRENT USE OF ANTICOAGULANT THERAPY: Primary | ICD-10-CM

## 2022-02-09 DIAGNOSIS — Z95.2 H/O MECHANICAL AORTIC VALVE REPLACEMENT: ICD-10-CM

## 2022-02-09 DIAGNOSIS — R04.0 RECURRENT EPISTAXIS: Primary | ICD-10-CM

## 2022-02-09 DIAGNOSIS — E11.69 HYPERLIPIDEMIA ASSOCIATED WITH TYPE 2 DIABETES MELLITUS: ICD-10-CM

## 2022-02-09 DIAGNOSIS — Z79.01 ANTICOAGULATED BY ANTICOAGULATION TREATMENT: ICD-10-CM

## 2022-02-09 DIAGNOSIS — E11.59 HYPERTENSION ASSOCIATED WITH DIABETES: ICD-10-CM

## 2022-02-09 LAB
ABO + RH BLD: NORMAL
ANION GAP SERPL CALC-SCNC: 7 MMOL/L (ref 8–16)
BASOPHILS # BLD AUTO: 0.03 K/UL (ref 0–0.2)
BASOPHILS NFR BLD: 0.6 % (ref 0–1.9)
BLD GP AB SCN CELLS X3 SERPL QL: NORMAL
BUN SERPL-MCNC: 60 MG/DL (ref 8–23)
CALCIUM SERPL-MCNC: 9.9 MG/DL (ref 8.7–10.5)
CHLORIDE SERPL-SCNC: 109 MMOL/L (ref 95–110)
CO2 SERPL-SCNC: 23 MMOL/L (ref 23–29)
CREAT SERPL-MCNC: 2.6 MG/DL (ref 0.5–1.4)
DIFFERENTIAL METHOD: ABNORMAL
EOSINOPHIL # BLD AUTO: 0.2 K/UL (ref 0–0.5)
EOSINOPHIL NFR BLD: 2.9 % (ref 0–8)
ERYTHROCYTE [DISTWIDTH] IN BLOOD BY AUTOMATED COUNT: 14.4 % (ref 11.5–14.5)
EST. GFR  (AFRICAN AMERICAN): 25.8 ML/MIN/1.73 M^2
EST. GFR  (NON AFRICAN AMERICAN): 22.3 ML/MIN/1.73 M^2
GLUCOSE SERPL-MCNC: 88 MG/DL (ref 70–110)
HCT VFR BLD AUTO: 28.1 % (ref 40–54)
HGB BLD-MCNC: 9 G/DL (ref 14–18)
IMM GRANULOCYTES # BLD AUTO: 0.01 K/UL (ref 0–0.04)
IMM GRANULOCYTES NFR BLD AUTO: 0.2 % (ref 0–0.5)
INR PPP: 2.4 (ref 0.8–1.2)
LYMPHOCYTES # BLD AUTO: 1.5 K/UL (ref 1–4.8)
LYMPHOCYTES NFR BLD: 29 % (ref 18–48)
MCH RBC QN AUTO: 30.1 PG (ref 27–31)
MCHC RBC AUTO-ENTMCNC: 32 G/DL (ref 32–36)
MCV RBC AUTO: 94 FL (ref 82–98)
MONOCYTES # BLD AUTO: 0.6 K/UL (ref 0.3–1)
MONOCYTES NFR BLD: 12 % (ref 4–15)
NEUTROPHILS # BLD AUTO: 2.8 K/UL (ref 1.8–7.7)
NEUTROPHILS NFR BLD: 55.3 % (ref 38–73)
NRBC BLD-RTO: 0 /100 WBC
PLATELET # BLD AUTO: 136 K/UL (ref 150–450)
PMV BLD AUTO: 10.3 FL (ref 9.2–12.9)
POTASSIUM SERPL-SCNC: 5.1 MMOL/L (ref 3.5–5.1)
PROTHROMBIN TIME: 23.5 SEC (ref 9–12.5)
RBC # BLD AUTO: 2.99 M/UL (ref 4.6–6.2)
SODIUM SERPL-SCNC: 139 MMOL/L (ref 136–145)
WBC # BLD AUTO: 5.1 K/UL (ref 3.9–12.7)

## 2022-02-09 PROCEDURE — 99214 OFFICE O/P EST MOD 30 MIN: CPT | Mod: HCNC,GC,S$GLB, | Performed by: INTERNAL MEDICINE

## 2022-02-09 PROCEDURE — 3288F FALL RISK ASSESSMENT DOCD: CPT | Mod: HCNC,CPTII,S$GLB, | Performed by: OTOLARYNGOLOGY

## 2022-02-09 PROCEDURE — 85610 PROTHROMBIN TIME: CPT | Mod: HCNC | Performed by: INTERNAL MEDICINE

## 2022-02-09 PROCEDURE — 99214 OFFICE O/P EST MOD 30 MIN: CPT | Mod: 25,HCNC,S$GLB, | Performed by: OTOLARYNGOLOGY

## 2022-02-09 PROCEDURE — 1101F PR PT FALLS ASSESS DOC 0-1 FALLS W/OUT INJ PAST YR: ICD-10-PCS | Mod: HCNC,CPTII,S$GLB, | Performed by: OTOLARYNGOLOGY

## 2022-02-09 PROCEDURE — 3078F DIAST BP <80 MM HG: CPT | Mod: HCNC,CPTII,S$GLB, | Performed by: INTERNAL MEDICINE

## 2022-02-09 PROCEDURE — 99999 PR PBB SHADOW E&M-EST. PATIENT-LVL III: ICD-10-PCS | Mod: PBBFAC,HCNC,, | Performed by: INTERNAL MEDICINE

## 2022-02-09 PROCEDURE — 86901 BLOOD TYPING SEROLOGIC RH(D): CPT | Mod: HCNC | Performed by: INTERNAL MEDICINE

## 2022-02-09 PROCEDURE — 3074F PR MOST RECENT SYSTOLIC BLOOD PRESSURE < 130 MM HG: ICD-10-PCS | Mod: HCNC,CPTII,S$GLB, | Performed by: INTERNAL MEDICINE

## 2022-02-09 PROCEDURE — 3072F LOW RISK FOR RETINOPATHY: CPT | Mod: HCNC,CPTII,S$GLB, | Performed by: INTERNAL MEDICINE

## 2022-02-09 PROCEDURE — 1125F PR PAIN SEVERITY QUANTIFIED, PAIN PRESENT: ICD-10-PCS | Mod: HCNC,CPTII,S$GLB, | Performed by: INTERNAL MEDICINE

## 2022-02-09 PROCEDURE — 3072F PR LOW RISK FOR RETINOPATHY: ICD-10-PCS | Mod: HCNC,CPTII,S$GLB, | Performed by: OTOLARYNGOLOGY

## 2022-02-09 PROCEDURE — 85025 COMPLETE CBC W/AUTO DIFF WBC: CPT | Mod: HCNC | Performed by: INTERNAL MEDICINE

## 2022-02-09 PROCEDURE — 99214 PR OFFICE/OUTPT VISIT, EST, LEVL IV, 30-39 MIN: ICD-10-PCS | Mod: 25,HCNC,S$GLB, | Performed by: OTOLARYNGOLOGY

## 2022-02-09 PROCEDURE — 1126F AMNT PAIN NOTED NONE PRSNT: CPT | Mod: HCNC,CPTII,S$GLB, | Performed by: OTOLARYNGOLOGY

## 2022-02-09 PROCEDURE — 1126F PR PAIN SEVERITY QUANTIFIED, NO PAIN PRESENT: ICD-10-PCS | Mod: HCNC,CPTII,S$GLB, | Performed by: OTOLARYNGOLOGY

## 2022-02-09 PROCEDURE — 99499 UNLISTED E&M SERVICE: CPT | Mod: S$GLB,,, | Performed by: STUDENT IN AN ORGANIZED HEALTH CARE EDUCATION/TRAINING PROGRAM

## 2022-02-09 PROCEDURE — 1159F PR MEDICATION LIST DOCUMENTED IN MEDICAL RECORD: ICD-10-PCS | Mod: HCNC,CPTII,S$GLB, | Performed by: OTOLARYNGOLOGY

## 2022-02-09 PROCEDURE — 1101F PT FALLS ASSESS-DOCD LE1/YR: CPT | Mod: HCNC,CPTII,S$GLB, | Performed by: OTOLARYNGOLOGY

## 2022-02-09 PROCEDURE — 80048 BASIC METABOLIC PNL TOTAL CA: CPT | Mod: HCNC | Performed by: INTERNAL MEDICINE

## 2022-02-09 PROCEDURE — 1125F AMNT PAIN NOTED PAIN PRSNT: CPT | Mod: HCNC,CPTII,S$GLB, | Performed by: INTERNAL MEDICINE

## 2022-02-09 PROCEDURE — 99999 PR PBB SHADOW E&M-EST. PATIENT-LVL IV: CPT | Mod: PBBFAC,HCNC,, | Performed by: OTOLARYNGOLOGY

## 2022-02-09 PROCEDURE — 99214 PR OFFICE/OUTPT VISIT, EST, LEVL IV, 30-39 MIN: ICD-10-PCS | Mod: HCNC,GC,S$GLB, | Performed by: INTERNAL MEDICINE

## 2022-02-09 PROCEDURE — 3072F PR LOW RISK FOR RETINOPATHY: ICD-10-PCS | Mod: HCNC,CPTII,S$GLB, | Performed by: INTERNAL MEDICINE

## 2022-02-09 PROCEDURE — 99499 RISK ADDL DX/OHS AUDIT: ICD-10-PCS | Mod: S$GLB,,, | Performed by: STUDENT IN AN ORGANIZED HEALTH CARE EDUCATION/TRAINING PROGRAM

## 2022-02-09 PROCEDURE — 1159F MED LIST DOCD IN RCRD: CPT | Mod: HCNC,CPTII,S$GLB, | Performed by: OTOLARYNGOLOGY

## 2022-02-09 PROCEDURE — 3074F SYST BP LT 130 MM HG: CPT | Mod: HCNC,CPTII,S$GLB, | Performed by: INTERNAL MEDICINE

## 2022-02-09 PROCEDURE — 3072F LOW RISK FOR RETINOPATHY: CPT | Mod: HCNC,CPTII,S$GLB, | Performed by: OTOLARYNGOLOGY

## 2022-02-09 PROCEDURE — 1160F PR REVIEW ALL MEDS BY PRESCRIBER/CLIN PHARMACIST DOCUMENTED: ICD-10-PCS | Mod: HCNC,CPTII,S$GLB, | Performed by: OTOLARYNGOLOGY

## 2022-02-09 PROCEDURE — 1160F RVW MEDS BY RX/DR IN RCRD: CPT | Mod: HCNC,CPTII,S$GLB, | Performed by: OTOLARYNGOLOGY

## 2022-02-09 PROCEDURE — 30903 PR CTRL NOSEBLEED,ANTER,COMPLEX: ICD-10-PCS | Mod: HCNC,LT,S$GLB, | Performed by: OTOLARYNGOLOGY

## 2022-02-09 PROCEDURE — 99999 PR PBB SHADOW E&M-EST. PATIENT-LVL IV: ICD-10-PCS | Mod: PBBFAC,HCNC,, | Performed by: OTOLARYNGOLOGY

## 2022-02-09 PROCEDURE — 3288F PR FALLS RISK ASSESSMENT DOCUMENTED: ICD-10-PCS | Mod: HCNC,CPTII,S$GLB, | Performed by: OTOLARYNGOLOGY

## 2022-02-09 PROCEDURE — 3078F PR MOST RECENT DIASTOLIC BLOOD PRESSURE < 80 MM HG: ICD-10-PCS | Mod: HCNC,CPTII,S$GLB, | Performed by: INTERNAL MEDICINE

## 2022-02-09 PROCEDURE — 30903 CONTROL OF NOSEBLEED: CPT | Mod: HCNC,LT,S$GLB, | Performed by: OTOLARYNGOLOGY

## 2022-02-09 PROCEDURE — 99999 PR PBB SHADOW E&M-EST. PATIENT-LVL III: CPT | Mod: PBBFAC,HCNC,, | Performed by: INTERNAL MEDICINE

## 2022-02-09 RX ORDER — AZELASTINE 1 MG/ML
1 SPRAY, METERED NASAL 2 TIMES DAILY PRN
Status: ON HOLD | COMMUNITY
End: 2022-10-24

## 2022-02-09 NOTE — PROGRESS NOTES
Subjective:     Dariel Urbina is a 81 y.o. male who was referred to me by Dr. Rebecca Goff in consultation for nosebleeds.    He relates a long history for many years of recurrent epistaxis, always on the left side.  He notes that these have worsened in recent weeks and has been in the ED twice this past month including 4 days ago when he was packed with fibrillar by the ENT service.  He also relates having numerous procedures including arterial ligations by Dr. Trivedi a number of years ago.  He has previously had nasal cauterization.  The bleeding has required packing for control.  The last episode was 1 day ago, and is not currently active.  There is a prior history of nasal surgery, including septoplasty many years ago that seemed to initiate these bleeding episodes.  There is not a prior history of nasal trauma.  There is not a history of environmental allergies.  He does not currently use a nasal spray other than saline.  There is not a family history to suggest a clotting disorder.  He does currently take a blood-thinning agent, including both plavix and warfarin.      Past Medical History  He has a past medical history of Anemia of chronic renal failure, stage 3 (moderate), Anticoagulant long-term use, Atherosclerosis of coronary artery bypass graft of native heart without angina pectoris, Bilateral carotid artery disease, Bleeding from the nose, Bleeding nose, Cataract, CKD (chronic kidney disease) stage 3, GFR 30-59 ml/min, Claudication of left lower extremity, Colon polyp, Encounter for blood transfusion, Gastroesophageal reflux disease without esophagitis, Gastrointestinal hemorrhage associated with intestinal diverticulosis, H/O mechanical aortic valve replacement, History of gout, Hyperparathyroidism due to renal insufficiency, Internal hemorrhoid, Long term current use of anticoagulant therapy, Mechanical heart valve present, Metabolic acidosis with normal anion gap and bicarbonate losses,  Mixed hyperlipidemia, NSTEMI (non-ST elevated myocardial infarction), Obesity, diabetes, and hypertension syndrome, PVD (peripheral vascular disease), Renovascular hypertension, Type 2 diabetes mellitus with diabetic peripheral angiopathy without gangrene, and Type 2 diabetes mellitus with stage 3 chronic kidney disease, without long-term current use of insulin.    Past Surgical History  He has a past surgical history that includes Cardiac valuve replacement; Cardiac catheterization; Coronary artery bypass graft; Coronary angioplasty; Cardiac valve replacement; Spine surgery; Vasectomy; Colon surgery; Colonoscopy (N/A, 3/31/2017); Hernia repair; Colonoscopy (N/A, 4/3/2018); Colonoscopy (N/A, 8/13/2018); Carpal tunnel release (Right, 5/19/2020); Coronary angiography (N/A, 10/4/2021); and Coronary bypass graft angiography (10/4/2021).    Family History  His family history includes Alcohol abuse in his father; Diabetes in his brother; Heart disease in his brother, father, mother, and sister; Heart failure in his brother, father, and mother; No Known Problems in his maternal aunt, maternal grandfather, maternal grandmother, maternal uncle, paternal aunt, paternal grandfather, paternal grandmother, paternal uncle, and sister.    Social History  He reports that he quit smoking about 41 years ago. He has a 20.00 pack-year smoking history. He has never used smokeless tobacco. He reports that he does not drink alcohol and does not use drugs.    Allergies  He is allergic to fosinopril and losartan.    Medications  He has a current medication list which includes the following prescription(s): allopurinol, azelastine, bumetanide, carvedilol, clopidogrel, glimepiride, isosorbide mononitrate, lisinopril, loperamide, nifedipine, omega-3 acid ethyl esters, oxymetazoline hcl, rosuvastatin, warfarin, and nitroglycerin.    Review of Systems     Constitutional: Positive for appetite change.  Negative for chills, fatigue, fever and  "unexpected weight loss.      HENT: Positive for nosebleeds.  Negative for ear discharge, ear infection, ear pain, facial swelling, hearing loss, mouth sores, postnasal drip, ringing in the ears, runny nose, sinus infection, sinus pressure, sore throat, stuffy nose, tonsil infection, dental problems, trouble swallowing and voice change.      Eyes:  Positive for eye drainage. Negative for change in eyesight, eye itching and photophobia.     Respiratory:  Negative for cough, shortness of breath, sleep apnea, snoring and wheezing.      Cardiovascular:  Negative for chest pain, foot swelling, irregular heartbeat and swollen veins.     Gastrointestinal:  Negative for abdominal pain, acid reflux, constipation, diarrhea, heartburn and vomiting.     Genitourinary: Negative for difficulty urinating, sexual problems and frequent urination.     Musc: Positive for back pain. Negative for aching joints, aching muscles and neck pain.     Skin: Negative for rash.     Allergy: Negative for food allergies and seasonal allergies.     Endocrine: Positive for cold intolerance. Negative for heat intolerance.      Neurological: Negative for dizziness, headaches, light-headedness, seizures and tremors.      Hematologic: Positive for bruises/bleeds easily. Negative for swollen glands.      Psychiatric: Negative for decreased concentration, depression, nervous/anxious and sleep disturbance.               Objective:     Ht 5' 6" (1.676 m)   Wt 84 kg (185 lb 3 oz)   BMI 29.89 kg/m²      Constitutional:   He appears well-developed. He is cooperative. Normal speech.  No hoarse voice.      Head:  Normocephalic. Salivary glands normal.  Facial strength is normal.      Ears:    Right Ear: No drainage or tenderness. Tympanic membrane is not perforated. Tympanic membrane mobility is normal. No middle ear effusion. No decreased hearing is noted.   Left Ear: No drainage or tenderness. Tympanic membrane is not perforated. Tympanic membrane mobility is " normal.  No middle ear effusion. No decreased hearing is noted.     Nose:  No mucosal edema, rhinorrhea, septal deviation or polyps. Epistaxis is observed. Turbinates normal, no turbinate masses and no turbinate hypertrophy.  Right sinus exhibits no maxillary sinus tenderness and no frontal sinus tenderness. Left sinus exhibits no maxillary sinus tenderness and no frontal sinus tenderness.   Focal vascular papule at left Little's area at inferior junction with nasal floor  Smaller arborization of vessels superiorly    Mouth/Throat  Oropharynx clear and moist without lesions or asymmetry and normal uvula midline. He does not have dentures. Normal dentition. No oral lesions or mucous membrane lesions. No oropharyngeal exudate or posterior oropharyngeal erythema. Mirror exam not performed due to patient tolerance.  Mirror exam not performed due to patient tolerance.      Neck:  Neck normal without thyromegaly masses, asymmetry, normal tracheal structure, crepitus, and tenderness, thyroid normal, trachea normal and no adenopathy. Normal range of motion present.     He has no cervical adenopathy.     Cardiovascular:   Regular rhythm.      Pulmonary/Chest:   Effort normal.     Psychiatric:   He has a normal mood and affect. His speech is normal and behavior is normal.     Neurological:   No cranial nerve deficit.     Skin:   No rash noted.       Procedure    Control of Epistaxis    Indication:  Epistaxis    Informed consent was obtained prior to proceeding.  The left nasal cavity was anesthetized with topical 4% lidocaine.  Bleeding was localized to the left nasal cavity in Little's area along the nasal floor and cauterized with electrocautery with bipolar forceps.  Local anesthetic injection of lidocaine was utilized prior to the cauterization.    The patient tolerated the procedure well.    Data Reviewed    Hemoglobin (g/dL)   Date Value   02/09/2022 9.0 (L)     Hematocrit (%)   Date Value   02/09/2022 28.1 (L)      Platelets (K/uL)   Date Value   02/09/2022 136 (L)     Prothrombin Time (sec)   Date Value   02/09/2022 23.5 (H)     aPTT (sec)   Date Value   06/22/2018 30.8     Coumadin Monitoring INR (no units)   Date Value   03/15/2010 2.0 (H)     INR (no units)   Date Value   02/09/2022 2.4 (H)   01/31/2022 3.7            Assessment:     1. Recurrent epistaxis    2. Anticoagulated by anticoagulation treatment         Plan:     I had a long discussion with the patient and his wife regarding his condition and the further workup and management options.  I recommended the daily use of nasal saline spray to prevent mucosal dessiccation.    Follow up if symptoms worsen or fail to improve.

## 2022-02-09 NOTE — PROGRESS NOTES
INR at goal. Medications and chart reviewed. No changes noted to necessitate adjustment of warfarin or follow-up plan. See calendar.  Recommend to decrease maintenance dose.  Will f/u on ENT visit.  Cauterization done by ENT.

## 2022-02-09 NOTE — PROGRESS NOTES
Correct coumadin dose was verified. No changes to report. Further Coumadin instructions and re-draw date given. Patient repeated back dosing directions. Verbalized understanding and will comply.

## 2022-02-09 NOTE — ASSESSMENT & PLAN NOTE
- Patient having Dianna 2b symptoms with left greater then right, able to walk 100ft before having symptoms. Denies rest pain  - He would like intervention to bilateral iliacs, plan to intervene once patient follows up with nephrology and renal function is back at baseline (Cr 2.6 today, baseline closer to 1.7-2.0).  - Plan to proceed with bilateral iliac intervention once he has been evalauted by nephrology. Will follow up in 3-4 months with BMP. Consents were signed in clinic today, scanned in.   - Currently on plavix 75mg PO daily and warfarin (has mechanical aortic valve). Once scheduled he will need to hold warfarin 3 days prior to his procedure and bridge with therapeutic lovenox  - Access: left femoral artery  - Allergies: No shellfish/Iodine allergy  - Pre-Hydration: 3 cc/kg/hr IV, continuous, for 1 hour, Pre-Procedure  - Pre-Op Med: Diphenhydramine (Benadryl) 50 mg, Oral, Once, Pre-Procedure   - The risks, benefits & alternatives of the procedure were explained to the patient.  The risks of coronary angiography include but are not limited to: Bleeding, infection, heart rhythm abnormalities, allergic reactions, kidney injury requiring dilaysis, limb loss, stroke and death. Should stenting be indicated, the patient has agreed to dual anti-platelet therapy for 1-consecutive year with a drug-eluting stent and a minimum of 1-month with the use of a bare metal stent. Additionally, patient is aware that non-compliance is likely to result in stent clotting with heart attack, heart failure, and/or death. The risks of moderate sedation include hypotension, respiratory depression, arrhythmias, bronchospasm, & death. Informed consent was obtained & the patient is agreeable to proceed with the procedure. All patient's questions were answered.     The risks (including death, heart attack, limb loss, paralysis, emergency surgery, bleeding, renal failure, and stroke), the potential benefits of the procedure, and the  alternatives to the procedure, were discussed in detail and accepted by the patient. All questions were answered. Patient agrees to proceed.

## 2022-02-09 NOTE — ASSESSMENT & PLAN NOTE
Patient continues to have multiple episodes of epistaxis requiring ED visit, hemoglobin today 9 (baseline closer 10 to 11)  Currently on warfarin for mechanical aortic valve and plavix for recent PCI in 11/2021  Has follow up with ENT this afternoon

## 2022-02-09 NOTE — ASSESSMENT & PLAN NOTE
Patient Cr 2.6 today, elevated from baseline  Arranged for follow up with nephrology   Plan to intervene on bilateral iliacs once renal function is back at baseline

## 2022-02-09 NOTE — PROGRESS NOTES
Interventional Cardiology Clinic Note    Subjective:   Patient ID:  Dariel Urbina is a 81 y.o. male with anemia, CKD stage 3, mechanical aortic valve on warfarin, coronary artery disease s/p PCI to left circumflex in 11/2021 who presents for follow up of claudication. He had peripheral angiogram in 10/2021 that showed significant bilateral iliac disease.       HPI  Patient has been doing well since his angioplasty. He denies chest pain at rest and with exertion. Denies shortness of breath at rest and with exertion. He continues to have episodes of epistaxis that last hours and require ED visits, he went to the ED twice last week for epistaxsis. Also reports an episode last night that resolved. He is taking plavix and warfarin. He has bilateral claudication pain that radiates from his calves all the way up. The right is worse then the left but they are similar in pain. He walks 100 ft before having pain. Denies rest pain. Denies PND, orthopnea. Denies hematuria, bloody stools.     Review of Systems   Constitutional: Negative for chills and fever.   HENT: Positive for nosebleeds.    Eyes: Negative for redness.   Cardiovascular: Positive for claudication. Negative for chest pain, dyspnea on exertion, leg swelling, near-syncope, orthopnea, palpitations, paroxysmal nocturnal dyspnea and syncope.   Respiratory: Negative for cough, shortness of breath and sputum production.    Hematologic/Lymphatic: Negative for bleeding problem.   Musculoskeletal: Negative for joint swelling and muscle weakness.   Gastrointestinal: Negative for hematemesis, melena, nausea and vomiting.   Genitourinary: Negative for hematuria.   Neurological: Negative for dizziness and weakness.          History:       Past Medical History:   Diagnosis Date    Anemia of chronic renal failure, stage 3 (moderate) 5/27/2015    Anticoagulant long-term use     Atherosclerosis of coronary artery bypass graft of native heart without angina pectoris  9/11/2012    3-27-18 [facility] Two vessel coronary artery disease.   Prosthetic aortic valve.   Porcelain aorta.   Patent LIMA graft.    Bilateral carotid artery disease 2/9/2017    Bleeding from the nose     Bleeding nose 3/21/2018    Cataract     CKD (chronic kidney disease) stage 3, GFR 30-59 ml/min 5/27/2015    Claudication of left lower extremity 9/17/2014    Colon polyp     Encounter for blood transfusion     Gastroesophageal reflux disease without esophagitis 3/19/2018    Gastrointestinal hemorrhage associated with intestinal diverticulosis 4/1/2018    H/O mechanical aortic valve replacement 09/17/2014    History of gout 9/26/2012    Hyperparathyroidism due to renal insufficiency 7/27/2015    Internal hemorrhoid 4/3/2018    Long term current use of anticoagulant therapy 9/26/2012    Mechanical heart valve present     Metabolic acidosis with normal anion gap and bicarbonate losses 3/20/2018    Mixed hyperlipidemia 9/26/2012    NSTEMI (non-ST elevated myocardial infarction) 3/21/2018    Obesity, diabetes, and hypertension syndrome 2/23/2016    PVD (peripheral vascular disease) 9/11/2012    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/2012    Type 2 diabetes mellitus with diabetic peripheral angiopathy without gangrene 5/27/2015    Type 2 diabetes mellitus with stage 3 chronic kidney disease, without long-term current use of insulin 10/2/2013     Past Surgical History:   Procedure Laterality Date    CARDIAC CATHETERIZATION      CARDIAC VALVE REPLACEMENT      CARDIAC VALVE SURGERY      CARPAL TUNNEL RELEASE Right 5/19/2020    Procedure: RELEASE, CARPAL TUNNEL;  Surgeon: Rupesh Norris Jr., MD;  Location: [facility];  Service: Plastics;  Laterality: Right;    COLON SURGERY      COLONOSCOPY N/A 3/31/2017    Procedure: COLONOSCOPY;  Surgeon: Bruno Raymond MD;  Location: [address]);  Service: Endoscopy;  Laterality: N/A;  Patient's wife requesting date.    COLONOSCOPY N/A 4/3/2018     Procedure: COLONOSCOPY;  Surgeon: Bonifacio Pelletier MD;  Location: [facility] ENDO (2ND FLR);  Service: Endoscopy;  Laterality: N/A;    COLONOSCOPY N/A 2018    Procedure: COLONOSCOPY;  Surgeon: Kam Barba MD;  Location: [facility] ENDO (2ND FLR);  Service: Endoscopy;  Laterality: N/A;  2nd floor: PA pressure 49; hx of moderate-severe valve disease     per Coumadin clinic-Patient can hold 5 days with lovenox bridge       ok to schedule per Katarina    CORONARY ANGIOGRAPHY N/A 10/4/2021    Procedure: Left heart cath +/- peripheral angiogram;  Surgeon: Jose Ruiz MD;  Location: [facility] CATH LAB;  Service: Cardiology;  Laterality: N/A;    CORONARY ANGIOPLASTY      CORONARY ARTERY BYPASS GRAFT      CORONARY BYPASS GRAFT ANGIOGRAPHY  10/4/2021    Procedure: Bypass graft study;  Surgeon: Jose Ruiz MD;  Location: [facility] CATH LAB;  Service: Cardiology;;    HERNIA REPAIR      SPINE SURGERY      VASECTOMY       Social History     Socioeconomic History    Marital status:      Spouse name: Ameena    Number of children: 3   Occupational History    Occupation: retired   Tobacco Use    Smoking status: Former Smoker     Packs/day: 1.00     Years: 20.00     Pack years: 20.00     Quit date: 1980     Years since quittin.4    Smokeless tobacco: Never Used   Substance and Sexual Activity    Alcohol use: No    Drug use: No    Sexual activity: Not Currently     Social Determinants of Health     Financial Resource Strain: Low Risk     Difficulty of Paying Living Expenses: Not hard at all   Food Insecurity: No Food Insecurity    Worried About Running Out of Food in the Last Year: Never true    Ran Out of Food in the Last Year: Never true   Transportation Needs: No Transportation Needs    Lack of Transportation (Medical): No    Lack of Transportation (Non-Medical): No   Physical Activity: Inactive    Days of Exercise per Week: 0 days    Minutes of Exercise per Session: 0 min   Stress: No Stress Concern  Present    Feeling of Stress : Not at all   Social Connections: Moderately Integrated    Frequency of Communication with Friends and Family: Once a week    Frequency of Social Gatherings with Friends and Family: More than three times a week    Attends Faith Services: Never    Active Member of Clubs or Organizations: Yes    Attends Club or Organization Meetings: More than 4 times per year    Marital Status:    Housing Stability: Low Risk     Unable to Pay for Housing in the Last Year: No    Number of Places Lived in the Last Year: 1    Unstable Housing in the Last Year: No     Family History   Problem Relation Age of Onset    Heart failure Mother     Heart disease Mother     Heart failure Father     Heart disease Father     Alcohol abuse Father     Heart failure Brother     Heart disease Brother     Diabetes Brother     No Known Problems Sister     No Known Problems Maternal Grandmother     No Known Problems Maternal Grandfather     No Known Problems Paternal Grandmother     No Known Problems Paternal Grandfather     Heart disease Sister     No Known Problems Maternal Aunt     No Known Problems Maternal Uncle     No Known Problems Paternal Aunt     No Known Problems Paternal Uncle     Amblyopia Neg Hx     Blindness Neg Hx     Cancer Neg Hx     Cataracts Neg Hx     Glaucoma Neg Hx     Hypertension Neg Hx     Macular degeneration Neg Hx     Retinal detachment Neg Hx     Strabismus Neg Hx     Stroke Neg Hx     Thyroid disease Neg Hx     Anemia Neg Hx     Arrhythmia Neg Hx     Asthma Neg Hx     Clotting disorder Neg Hx     Fainting Neg Hx     Heart attack Neg Hx     Hyperlipidemia Neg Hx     Atrial Septal Defect Neg Hx     Melanoma Neg Hx       Review of patient's allergies indicates:   Allergen Reactions    Fosinopril      Intolerance- elevates potassium level      Losartan      Intolerance- elevates potassium level     has a current medication list which  includes the following prescription(s): allopurinol, azelastine, bumetanide, carvedilol, clopidogrel, glimepiride, isosorbide mononitrate, lisinopril, loperamide, nifedipine, omega-3 acid ethyl esters, oxymetazoline hcl, rosuvastatin, warfarin, and nitroglycerin.           Meds:     Review of patient's allergies indicates:   Allergen Reactions    Fosinopril      Intolerance- elevates potassium level      Losartan      Intolerance- elevates potassium level       Current Outpatient Medications:     allopurinoL (ZYLOPRIM) 100 MG tablet, TAKE 1 TABLET EVERY DAY, Disp: 90 tablet, Rfl: 3    azelastine (ASTELIN) 137 mcg (0.1 %) nasal spray, 1 spray by Nasal route 2 (two) times daily., Disp: , Rfl:     bumetanide (BUMEX) 2 MG tablet, Take 2mg Once to twice daily or as directed (Patient taking differently: Take 2 mg by mouth 2 (two) times daily.), Disp: 180 tablet, Rfl: 3    carvediloL (COREG) 12.5 MG tablet, Take 1 tablet (12.5 mg total) by mouth 2 (two) times daily., Disp: 180 tablet, Rfl: 3    clopidogreL (PLAVIX) 75 mg tablet, Take 1 tablet (75 mg total) by mouth once daily., Disp: 30 tablet, Rfl: 11    glimepiride (AMARYL) 1 MG tablet, Take 1 tablet (1 mg total) by mouth every morning., Disp: 90 tablet, Rfl: 3    isosorbide mononitrate (IMDUR) 120 MG 24 hr tablet, TAKE 1 TABLET EVERY DAY, Disp: 90 tablet, Rfl: 4    lisinopriL (PRINIVIL,ZESTRIL) 2.5 MG tablet, TAKE 1 TABLET (2.5 MG TOTAL) BY MOUTH ONCE DAILY., Disp: 90 tablet, Rfl: 3    loperamide (IMODIUM A-D) 2 mg Tab, Take 2 capsules at onset then 1 capsule as needed for diarrhea., Disp: , Rfl:     NIFEdipine (PROCARDIA-XL) 60 MG (OSM) 24 hr tablet, Take 1 tablet (60 mg total) by mouth once daily., Disp: 90 tablet, Rfl: 4    omega-3 acid ethyl esters (LOVAZA) 1 gram capsule, Take 2 capsules (2 g total) by mouth 2 (two) times daily. (Patient taking differently: Take 1 g by mouth 2 (two) times daily.), Disp: 360 capsule, Rfl: 5    oxymetazoline HCl (AFRIN,  "OXYMETAZOLINE, NASL), 1 spray by Each Nostril route once as needed (congestion)., Disp: , Rfl:     rosuvastatin (CRESTOR) 40 MG Tab, TAKE 1 TABLET EVERY EVENING., Disp: 90 tablet, Rfl: 3    warfarin (COUMADIN) 5 MG tablet, Take 2 tablets (10mg) by mouth Sunday, then 1.5 tablets (7.5mg) by mouth all other days as instructed by Coumadin Clinic. (Patient taking differently: Take 1 tablet (5 mg) by mouth Monday, Wednesday & Friday then 1.5 tablets (7.5mg) by mouth all other days (Tues, Thurs, Sat & Sun) as instructed by Coumadin Clinic.), Disp: 143 tablet, Rfl: 0    nitroGLYCERIN (NITROSTAT) 0.4 MG SL tablet, Place 1 tablet (0.4 mg total) under the tongue every 5 (five) minutes as needed. As needed for chest pain (Patient not taking: Reported on 2/9/2022), Disp: 90 tablet, Rfl: 4    Objective:   BP (!) 96/53 (BP Location: Right arm, Patient Position: Sitting, BP Method: Large (Automatic))   Pulse (!) 56   Ht 5' 6" (1.676 m)   Wt 84 kg (185 lb 3 oz)   SpO2 100%   BMI 29.89 kg/m²   Physical Exam  Constitutional:       Appearance: Normal appearance.   HENT:      Head: Normocephalic and atraumatic.      Mouth/Throat:      Mouth: Mucous membranes are moist.   Eyes:      Extraocular Movements: Extraocular movements intact.   Cardiovascular:      Rate and Rhythm: Normal rate and regular rhythm.      Pulses:           Radial pulses are 2+ on the left side.        Dorsalis pedis pulses are detected w/ Doppler on the right side and detected w/ Doppler on the left side.        Posterior tibial pulses are detected w/ Doppler on the right side and detected w/ Doppler on the left side.      Heart sounds: Normal heart sounds, S1 normal and S2 normal. No murmur heard.       Comments: R radial pulse absent, doppler signal present  Pulmonary:      Effort: Pulmonary effort is normal.      Breath sounds: Normal breath sounds. No wheezing, rhonchi or rales.   Abdominal:      General: Bowel sounds are normal. There is no distension. "      Palpations: Abdomen is soft.      Tenderness: There is no abdominal tenderness.   Musculoskeletal:      Cervical back: Normal range of motion.      Right lower leg: No edema.      Left lower leg: No edema.   Skin:     General: Skin is warm and dry.   Neurological:      Mental Status: He is alert and oriented to person, place, and time.   Psychiatric:         Mood and Affect: Mood normal.         Behavior: Behavior normal.         Labs:     Lab Results   Component Value Date     02/09/2022    K 5.1 02/09/2022     02/09/2022    CO2 23 02/09/2022    BUN 60 (H) 02/09/2022    CREATININE 2.6 (H) 02/09/2022    ANIONGAP 7 (L) 02/09/2022     Lab Results   Component Value Date    HGBA1C 5.4 11/10/2021     Lab Results   Component Value Date     (H) 01/06/2020     (H) 03/20/2018       Lab Results   Component Value Date    WBC 5.10 02/09/2022    HGB 9.0 (L) 02/09/2022    HCT 28.1 (L) 02/09/2022     (L) 02/09/2022    GRAN 2.8 02/09/2022    GRAN 55.3 02/09/2022     Lab Results   Component Value Date    CHOL 152 02/09/2021    HDL 34 (L) 02/09/2021    LDLCALC 70.4 02/09/2021    TRIG 238 (H) 02/09/2021       Lab Results   Component Value Date     02/09/2022    K 5.1 02/09/2022     02/09/2022    CO2 23 02/09/2022    BUN 60 (H) 02/09/2022    CREATININE 2.6 (H) 02/09/2022    ANIONGAP 7 (L) 02/09/2022     Lab Results   Component Value Date    HGBA1C 5.4 11/10/2021     Lab Results   Component Value Date     (H) 01/06/2020     (H) 03/20/2018    Lab Results   Component Value Date    WBC 5.10 02/09/2022    HGB 9.0 (L) 02/09/2022    HCT 28.1 (L) 02/09/2022     (L) 02/09/2022    GRAN 2.8 02/09/2022    GRAN 55.3 02/09/2022     Lab Results   Component Value Date    CHOL 152 02/09/2021    HDL 34 (L) 02/09/2021    LDLCALC 70.4 02/09/2021    TRIG 238 (H) 02/09/2021                Cardiovascular Imaging:     Echo:   Nuc Stress EF   Date Value Ref Range Status   11/03/2021 46 %  Final     Nuc Rest EF   Date Value Ref Range Status   11/03/2021 44  Final     11/2021    · Elecative PCI of LCX  · The Ost Cx lesion was 99% stenosed with 0% stenosis post-intervention. Two overlapping RESOLUTE ALEJO [id number] stent was successfully placed.  · The Prox Cx lesion was 75% stenosed with 0% stenosis post-intervention. A STENT RESOLUTE ALEJO [id number] stent was successfully placed at 12 BAIRON for 10 sec.  · IVUS of LCX and LM for stent sizing  · The Ost LAD lesion was 80% stenosed. patent LIMA to LAD graft in previous angiogram, graft not injected today  · The estimated blood loss was <50 mL.      10/2021    · There was three vessel coronary artery disease.  · The Mid LM to Dist LM lesion was 60% stenosed.  · Patent CAMARGO to LAD  · The Ost Cx to Prox Cx lesion was 99% stenosed.  · The Prox L MOOK lesion was 75% stenosed.  · The Prox R MOOK lesion was 70% stenosed.  · The Dist L SFA lesion was 50% stenosed.  · The estimated blood loss was <50 mL.        Assessment:       1. Stage 3b chronic kidney disease    2. Claudication in peripheral vascular disease    3. Anemia, unspecified type    4. Hyperlipidemia associated with type 2 diabetes mellitus    5. Hypertension associated with diabetes             Plan:     Claudication in peripheral vascular disease  - Patient having Dianna 2b symptoms with left greater then right, able to walk 100ft before having symptoms. Denies rest pain  - He would like intervention to bilateral iliacs, plan to intervene once patient follows up with nephrology and renal function is back at baseline (Cr 2.6 today, baseline closer to 1.7-2.0).  - Plan to proceed with bilateral iliac intervention once he has been evalauted by nephrology. Will follow up in 3-4 months with [facility]. Consents were signed in clinic today, scanned in.   - Currently on plavix 75mg PO daily and warfarin (has mechanical aortic valve). Once scheduled he will need to hold warfarin 3 days prior to his procedure and  bridge with therapeutic lovenox  - Access: left femoral artery  - Allergies: No shellfish/Iodine allergy  - Pre-Hydration: 3 cc/kg/hr IV, continuous, for 1 hour, Pre-Procedure  - Pre-Op Med: Diphenhydramine (Benadryl) 50 mg, Oral, Once, Pre-Procedure   - The risks, benefits & alternatives of the procedure were explained to the patient.  The risks of coronary angiography include but are not limited to: Bleeding, infection, heart rhythm abnormalities, allergic reactions, kidney injury requiring dilaysis, limb loss, stroke and death. Should stenting be indicated, the patient has agreed to dual anti-platelet therapy for 1-consecutive year with a drug-eluting stent and a minimum of 1-month with the use of a bare metal stent. Additionally, patient is aware that non-compliance is likely to result in stent clotting with heart attack, heart failure, and/or death. The risks of moderate sedation include hypotension, respiratory depression, arrhythmias, bronchospasm, & death. Informed consent was obtained & the patient is agreeable to proceed with the procedure. All patient's questions were answered.     The risks (including death, heart attack, limb loss, paralysis, emergency surgery, bleeding, renal failure, and stroke), the potential benefits of the procedure, and the alternatives to the procedure, were discussed in detail and accepted by the patient. All questions were answered. Patient agrees to proceed.    Stage 3b chronic kidney disease  Patient Cr 2.6 today, elevated from baseline  Arranged for follow up with nephrology   Plan to intervene on bilateral iliacs once renal function is back at baseline    Anemia  Patient continues to have multiple episodes of epistaxis requiring ED visit, hemoglobin today 9 (baseline closer 10 to 11)  Currently on warfarin for mechanical aortic valve and plavix for recent PCI in 11/2021  Has follow up with ENT this afternoon    Hyperlipidemia associated with type 2 diabetes  mellitus  Continue high intensity statin  Most recent LDL 70.4    Hypertension associated with diabetes  Blood pressure well controlled on current regimen  Continue anti hypertensives as prescribed      Signed:  Pete Adames M.D.  Interventional Cardiology Fellow PGY-7  [facility]   02/09/2022

## 2022-02-10 ENCOUNTER — TELEPHONE (OUTPATIENT)
Dept: NEPHROLOGY | Facility: CLINIC | Age: 82
End: 2022-02-10
Payer: MEDICARE

## 2022-02-10 ENCOUNTER — LAB VISIT (OUTPATIENT)
Dept: LAB | Facility: HOSPITAL | Age: 82
End: 2022-02-10
Attending: INTERNAL MEDICINE
Payer: MEDICARE

## 2022-02-10 ENCOUNTER — OFFICE VISIT (OUTPATIENT)
Dept: INTERNAL MEDICINE | Facility: CLINIC | Age: 82
End: 2022-02-10
Payer: MEDICARE

## 2022-02-10 VITALS
HEIGHT: 66 IN | SYSTOLIC BLOOD PRESSURE: 98 MMHG | DIASTOLIC BLOOD PRESSURE: 57 MMHG | WEIGHT: 186.06 LBS | HEART RATE: 55 BPM | BODY MASS INDEX: 29.9 KG/M2 | OXYGEN SATURATION: 97 %

## 2022-02-10 DIAGNOSIS — R04.0 EPISTAXIS: ICD-10-CM

## 2022-02-10 DIAGNOSIS — E78.5 HYPERLIPIDEMIA ASSOCIATED WITH TYPE 2 DIABETES MELLITUS: ICD-10-CM

## 2022-02-10 DIAGNOSIS — E11.51 TYPE 2 DIABETES MELLITUS WITH DIABETIC PERIPHERAL ANGIOPATHY WITHOUT GANGRENE, WITHOUT LONG-TERM CURRENT USE OF INSULIN: ICD-10-CM

## 2022-02-10 DIAGNOSIS — I27.9 PULMONARY HEART DISEASE: ICD-10-CM

## 2022-02-10 DIAGNOSIS — E66.01 MORBID OBESITY: ICD-10-CM

## 2022-02-10 DIAGNOSIS — N18.4 CHRONIC KIDNEY DISEASE (CKD), STAGE IV (SEVERE): ICD-10-CM

## 2022-02-10 DIAGNOSIS — E11.69 HYPERLIPIDEMIA ASSOCIATED WITH TYPE 2 DIABETES MELLITUS: ICD-10-CM

## 2022-02-10 DIAGNOSIS — N18.4 CHRONIC KIDNEY DISEASE (CKD), STAGE IV (SEVERE): Primary | ICD-10-CM

## 2022-02-10 DIAGNOSIS — R06.09 DOE (DYSPNEA ON EXERTION): ICD-10-CM

## 2022-02-10 DIAGNOSIS — D64.9 ANEMIA, UNSPECIFIED TYPE: ICD-10-CM

## 2022-02-10 DIAGNOSIS — N25.81 HYPERPARATHYROIDISM DUE TO RENAL INSUFFICIENCY: ICD-10-CM

## 2022-02-10 DIAGNOSIS — I73.9 CLAUDICATION IN PERIPHERAL VASCULAR DISEASE: ICD-10-CM

## 2022-02-10 PROBLEM — U07.1 COVID: Status: RESOLVED | Noted: 2022-02-03 | Resolved: 2022-02-10

## 2022-02-10 PROBLEM — K92.1 HEMATOCHEZIA: Status: RESOLVED | Noted: 2018-08-13 | Resolved: 2022-02-10

## 2022-02-10 LAB
ALBUMIN SERPL BCP-MCNC: 3.6 G/DL (ref 3.5–5.2)
ALP SERPL-CCNC: 52 U/L (ref 55–135)
ALT SERPL W/O P-5'-P-CCNC: 14 U/L (ref 10–44)
ANION GAP SERPL CALC-SCNC: 13 MMOL/L (ref 8–16)
AST SERPL-CCNC: 23 U/L (ref 10–40)
BASOPHILS # BLD AUTO: 0.03 K/UL (ref 0–0.2)
BASOPHILS NFR BLD: 0.5 % (ref 0–1.9)
BILIRUB SERPL-MCNC: 0.4 MG/DL (ref 0.1–1)
BUN SERPL-MCNC: 59 MG/DL (ref 8–23)
CALCIUM SERPL-MCNC: 9.8 MG/DL (ref 8.7–10.5)
CHLORIDE SERPL-SCNC: 105 MMOL/L (ref 95–110)
CHOLEST SERPL-MCNC: 103 MG/DL (ref 120–199)
CHOLEST/HDLC SERPL: 3 {RATIO} (ref 2–5)
CO2 SERPL-SCNC: 22 MMOL/L (ref 23–29)
CREAT SERPL-MCNC: 2.5 MG/DL (ref 0.5–1.4)
DIFFERENTIAL METHOD: ABNORMAL
EOSINOPHIL # BLD AUTO: 0.1 K/UL (ref 0–0.5)
EOSINOPHIL NFR BLD: 2.4 % (ref 0–8)
ERYTHROCYTE [DISTWIDTH] IN BLOOD BY AUTOMATED COUNT: 14.6 % (ref 11.5–14.5)
EST. GFR  (AFRICAN AMERICAN): 27 ML/MIN/1.73 M^2
EST. GFR  (NON AFRICAN AMERICAN): 23.4 ML/MIN/1.73 M^2
ESTIMATED AVG GLUCOSE: 117 MG/DL (ref 68–131)
GLUCOSE SERPL-MCNC: 67 MG/DL (ref 70–110)
HBA1C MFR BLD: 5.7 % (ref 4–5.6)
HCT VFR BLD AUTO: 28.4 % (ref 40–54)
HDLC SERPL-MCNC: 34 MG/DL (ref 40–75)
HDLC SERPL: 33 % (ref 20–50)
HGB BLD-MCNC: 8.9 G/DL (ref 14–18)
IMM GRANULOCYTES # BLD AUTO: 0.02 K/UL (ref 0–0.04)
IMM GRANULOCYTES NFR BLD AUTO: 0.3 % (ref 0–0.5)
LDLC SERPL CALC-MCNC: 43.4 MG/DL (ref 63–159)
LYMPHOCYTES # BLD AUTO: 1.3 K/UL (ref 1–4.8)
LYMPHOCYTES NFR BLD: 22.7 % (ref 18–48)
MCH RBC QN AUTO: 29.9 PG (ref 27–31)
MCHC RBC AUTO-ENTMCNC: 31.3 G/DL (ref 32–36)
MCV RBC AUTO: 95 FL (ref 82–98)
MONOCYTES # BLD AUTO: 0.6 K/UL (ref 0.3–1)
MONOCYTES NFR BLD: 10.6 % (ref 4–15)
NEUTROPHILS # BLD AUTO: 3.7 K/UL (ref 1.8–7.7)
NEUTROPHILS NFR BLD: 63.5 % (ref 38–73)
NONHDLC SERPL-MCNC: 69 MG/DL
NRBC BLD-RTO: 0 /100 WBC
PLATELET # BLD AUTO: 172 K/UL (ref 150–450)
PMV BLD AUTO: 11.7 FL (ref 9.2–12.9)
POTASSIUM SERPL-SCNC: 5 MMOL/L (ref 3.5–5.1)
PROT SERPL-MCNC: 7.4 G/DL (ref 6–8.4)
PTH-INTACT SERPL-MCNC: 344.1 PG/ML (ref 9–77)
RBC # BLD AUTO: 2.98 M/UL (ref 4.6–6.2)
SODIUM SERPL-SCNC: 140 MMOL/L (ref 136–145)
TRIGL SERPL-MCNC: 128 MG/DL (ref 30–150)
WBC # BLD AUTO: 5.85 K/UL (ref 3.9–12.7)

## 2022-02-10 PROCEDURE — 3072F PR LOW RISK FOR RETINOPATHY: ICD-10-PCS | Mod: HCNC,CPTII,S$GLB, | Performed by: INTERNAL MEDICINE

## 2022-02-10 PROCEDURE — 1101F PR PT FALLS ASSESS DOC 0-1 FALLS W/OUT INJ PAST YR: ICD-10-PCS | Mod: HCNC,CPTII,S$GLB, | Performed by: INTERNAL MEDICINE

## 2022-02-10 PROCEDURE — 3288F FALL RISK ASSESSMENT DOCD: CPT | Mod: HCNC,CPTII,S$GLB, | Performed by: INTERNAL MEDICINE

## 2022-02-10 PROCEDURE — 99214 OFFICE O/P EST MOD 30 MIN: CPT | Mod: HCNC,S$GLB,, | Performed by: INTERNAL MEDICINE

## 2022-02-10 PROCEDURE — 3078F DIAST BP <80 MM HG: CPT | Mod: HCNC,CPTII,S$GLB, | Performed by: INTERNAL MEDICINE

## 2022-02-10 PROCEDURE — 1126F PR PAIN SEVERITY QUANTIFIED, NO PAIN PRESENT: ICD-10-PCS | Mod: HCNC,CPTII,S$GLB, | Performed by: INTERNAL MEDICINE

## 2022-02-10 PROCEDURE — 99499 RISK ADDL DX/OHS AUDIT: ICD-10-PCS | Mod: S$GLB,,, | Performed by: INTERNAL MEDICINE

## 2022-02-10 PROCEDURE — 99499 UNLISTED E&M SERVICE: CPT | Mod: S$GLB,,, | Performed by: INTERNAL MEDICINE

## 2022-02-10 PROCEDURE — 1159F PR MEDICATION LIST DOCUMENTED IN MEDICAL RECORD: ICD-10-PCS | Mod: HCNC,CPTII,S$GLB, | Performed by: INTERNAL MEDICINE

## 2022-02-10 PROCEDURE — 80061 LIPID PANEL: CPT | Mod: HCNC | Performed by: INTERNAL MEDICINE

## 2022-02-10 PROCEDURE — 36415 COLL VENOUS BLD VENIPUNCTURE: CPT | Mod: HCNC | Performed by: INTERNAL MEDICINE

## 2022-02-10 PROCEDURE — 85025 COMPLETE CBC W/AUTO DIFF WBC: CPT | Mod: HCNC | Performed by: INTERNAL MEDICINE

## 2022-02-10 PROCEDURE — 1101F PT FALLS ASSESS-DOCD LE1/YR: CPT | Mod: HCNC,CPTII,S$GLB, | Performed by: INTERNAL MEDICINE

## 2022-02-10 PROCEDURE — 99999 PR PBB SHADOW E&M-EST. PATIENT-LVL V: ICD-10-PCS | Mod: PBBFAC,HCNC,, | Performed by: INTERNAL MEDICINE

## 2022-02-10 PROCEDURE — 3288F PR FALLS RISK ASSESSMENT DOCUMENTED: ICD-10-PCS | Mod: HCNC,CPTII,S$GLB, | Performed by: INTERNAL MEDICINE

## 2022-02-10 PROCEDURE — 99999 PR PBB SHADOW E&M-EST. PATIENT-LVL V: CPT | Mod: PBBFAC,HCNC,, | Performed by: INTERNAL MEDICINE

## 2022-02-10 PROCEDURE — 3074F SYST BP LT 130 MM HG: CPT | Mod: HCNC,CPTII,S$GLB, | Performed by: INTERNAL MEDICINE

## 2022-02-10 PROCEDURE — 1159F MED LIST DOCD IN RCRD: CPT | Mod: HCNC,CPTII,S$GLB, | Performed by: INTERNAL MEDICINE

## 2022-02-10 PROCEDURE — 3078F PR MOST RECENT DIASTOLIC BLOOD PRESSURE < 80 MM HG: ICD-10-PCS | Mod: HCNC,CPTII,S$GLB, | Performed by: INTERNAL MEDICINE

## 2022-02-10 PROCEDURE — 99214 PR OFFICE/OUTPT VISIT, EST, LEVL IV, 30-39 MIN: ICD-10-PCS | Mod: HCNC,S$GLB,, | Performed by: INTERNAL MEDICINE

## 2022-02-10 PROCEDURE — 3074F PR MOST RECENT SYSTOLIC BLOOD PRESSURE < 130 MM HG: ICD-10-PCS | Mod: HCNC,CPTII,S$GLB, | Performed by: INTERNAL MEDICINE

## 2022-02-10 PROCEDURE — 3072F LOW RISK FOR RETINOPATHY: CPT | Mod: HCNC,CPTII,S$GLB, | Performed by: INTERNAL MEDICINE

## 2022-02-10 PROCEDURE — 80053 COMPREHEN METABOLIC PANEL: CPT | Mod: HCNC | Performed by: INTERNAL MEDICINE

## 2022-02-10 PROCEDURE — 83970 ASSAY OF PARATHORMONE: CPT | Mod: HCNC | Performed by: INTERNAL MEDICINE

## 2022-02-10 PROCEDURE — 83036 HEMOGLOBIN GLYCOSYLATED A1C: CPT | Mod: HCNC | Performed by: INTERNAL MEDICINE

## 2022-02-10 PROCEDURE — 1126F AMNT PAIN NOTED NONE PRSNT: CPT | Mod: HCNC,CPTII,S$GLB, | Performed by: INTERNAL MEDICINE

## 2022-02-10 NOTE — TELEPHONE ENCOUNTER
Called pt wife made appt     ----- Message from Jorge Man sent at 2/10/2022 10:15 AM CST -----  Contact: self  Pt provider Dr Langston would like him to make an appt  pt is having labs done on today [facility]    Please Call    Contact  [phone]

## 2022-02-10 NOTE — PROGRESS NOTES
[age] yo M here for follow up his medical problems.  I last saw him a year ago.             He has  diabetes, hypertension, CAD, HLD, gout and mechanical aortic valve who Had epistaxis earlier this month.  He was hospitalized late in January and then again in early February for nose bleed they could not stop.  He is on both Coumadin and Plavix any has history of anemia.  His last cardiovascular stent was in November of 2021. He did see ENT yesterday and they cauterized his nose he said he felt better and slept well last night.  He has not had any nose bleeds since his cauterization yesterday.      He had COVID 3 weeks ago.  He came to the hospital he was given Remdesiver.  He says that he has pretty much recovered from the COVID.  He denies any shortness of breath or cough.         Diabetes mellitus type 2. Last A1c was  5.7   -- . He was put on amaryl 1mg last visit.  .  . Weight today is 1 86 # lbs-  Weight   is down 9 lb Since last visit. . Patient denies polyuria, polydipsia, visual disturbances.         Hyperlipidemia. On lipitor 40 mg , lovaza. And Fenofibrate --Recent lipid panel was reviewed.  . He says he is taking all his cholesterol meds.         HTN. On monopril, lasix, imdur, lopressor. Pt does not measure BPs at home. BP today is 98/57. . HIs  ACE was stopped due to JIM and hyperkalemia. K=5.2.  He see nephrology in August- recent creatine was 1.5-- K was 4.5.          CKD stage III. Pt saw Nephrology in August 2019-- than note was reviewed.  He has had some acute on chronic kidney failure.  Most recent creatinine is 2.6 back on the 9th.  I year ago his creatinine was 1.9.  He has had several creatinines during hospitalization in the low threes.  His potasium is  5.0. He has been instructed on a a low potasium diet by nephrology, but is not following one.  .         H/o partial colon resection due to diverticulosis. See above.  NO GI  bleeding since last visit.  NO recent diarrhea.          .  "   H/o CAD s/p CABG 2002, multiple stents, aortic valve replacement on coumadin. He saws he been having  chest pain for about 4 years ( not really per him)-- he has limited activity .   He had another stent placed back in November 2021 a zone Plavix.  He also went to cardiac rehab.--  . He has LÓPEZ but this is chronic and some left sided claudication.   He has not had any chest pains recently.  He says the pain he is having is a burning in the throat. It has not changed in 3 years.        AAA- last us Showed 3.32 cm AAA- (3/2018 )largest supra renal area. since last visit -            H/o gout. On allopurinol. Last flare was 18 mo ago in left foot. He remains on Allopurinol.      Valvular heart disease- with mechanical AV, some MVR and some TVR-- NO NO recent chest pains .  Reviewed recent PET stress test.   SOB with walking 75 feet- unchanged since last visit, and the one previous to that.                  Review of Systems    Constitutional: Negative for fever, chills and unexpected weight change.    HENT: Negative for congestion, rhinorrhea and sinus pressure.    Eyes: Negative for visual disturbance.    Respiratory: He Has some SOB, but this is unchanged. He can mow the lawn with walk behind mower.    This has not changed since last visit.    Cardiovascular:as above.  .    Gastrointestinal: Negative for nausea, vomiting, abdominal pain, and constipation.  He does have some diarrhea-  - as above.    Genitourinary: Negative for dysuria, urgency and difficulty urinating.    Musculoskeletal: He reports his back- that was bothering him last vsiit- is not bothering him recently.    Skin: Negative.    Neurological: Negative for dizziness, syncope and headaches.    Hematological: Negative.    Psychiatric/Behavioral: Negative.    All other systems reviewed and are negative.    Objective:             BP (!) 98/57 (BP Location: Right arm, Patient Position: Sitting, BP Method: Large (Manual))   Pulse (!) 55   Ht 5' 6" " (1.676 m)   Wt 84.4 kg (186 lb 1.1 oz)   SpO2 97%   BMI 30.03 kg/m²         Constitutional: He is oriented to person, place, and time. He appears well-developed and well-nourished. No distress.    HENT:    Head: Normocephalic and atraumatic. He has a benign lesion on his left check.    Mouth/Throat: Oropharynx is clear and moist. No oropharyngeal exudate.    Eyes: Conjunctivae and EOM are normal.    Neck: Normal range of motion. Neck supple. No JVD present. No tracheal deviation present.    Cardiovascular: Regular rhythm and normal heart sounds. Exam reveals no gallop and no friction rub.    Systolic murmur heard.     Pulmonary/Chest: Effort normal and breath sounds normal. No respiratory distress. He has no wheezes. He has no rales.    Abdominal: Soft. Bowel sounds are normal. He exhibits no distension. There is no tenderness. There is no rebound.   Musculoskeletal: Normal range of motion. He exhibits no edema and no tenderness.   Neurological: He is alert and oriented to person, place, and time.    Skin: Skin is warm and dry. No rash noted. He is not diaphoretic. No erythema.   Psychiatric: He has a normal mood and affect. His behavior is normal.        Protective Sensation (w/ 10 gram monofilament):  Right: Intact  Left: Intact    Visual Inspection:  Normal -  Bilateral    Pedal Pulses:   Right: Present  Left: Present    Posterior tibialis:   Right:Present  Left: Present                         Assessment:      1.   DM (diabetes mellitus)     2.   Hyperlipidemia     3.   Chronic kidney disease, stage III (moderate)     4.   History of colon resection     5.   Hypertension     6.   CAD (coronary atherosclerotic disease)     7.    8.  9.  10.  History of aortic valve repair    AAA  [facility]- on coumadin   Colon polyps--     Plan:        Chronic kidney disease (CKD), stage IV (severe)  -     PTH, Intact; Future; Expected date: 02/10/2022    Morbid obesity    Pulmonary heart disease    Anemia, unspecified  type    Claudication in peripheral vascular disease    Epistaxis    LÓPEZ (dyspnea on exertion)    Hyperlipidemia associated with type 2 diabetes mellitus  -     Lipid Panel; Future; Expected date: 02/10/2022  -     CBC Auto Differential; Future; Expected date: 02/10/2022    Hyperparathyroidism due to renal insufficiency    Type 2 diabetes mellitus with diabetic peripheral angiopathy without gangrene, without long-term current use of insulin  -     Comprehensive Metabolic Panel; Future; Expected date: 02/10/2022  -     Hemoglobin A1C; Future; Expected date: 02/10/2022  -     Ambulatory referral/consult to Nephrology; Future; Expected date: 02/17/2022         Also going to have him see Nephrology since he is past due to follow-up with them.  Reviewed his anemia his recent labs.  He is on both Coumadin for his heart valve issues and he is on Plavix because of the stent he had but back in on November 2021. No sequelae epistaxis seen at the current time.       WIll follow up in 3 months

## 2022-02-23 ENCOUNTER — ANTI-COAG VISIT (OUTPATIENT)
Dept: CARDIOLOGY | Facility: CLINIC | Age: 82
End: 2022-02-23
Payer: MEDICARE

## 2022-02-23 ENCOUNTER — LAB VISIT (OUTPATIENT)
Dept: LAB | Facility: HOSPITAL | Age: 82
End: 2022-02-23
Attending: INTERNAL MEDICINE
Payer: MEDICARE

## 2022-02-23 DIAGNOSIS — Z95.2 H/O MECHANICAL AORTIC VALVE REPLACEMENT: ICD-10-CM

## 2022-02-23 DIAGNOSIS — Z79.01 LONG TERM CURRENT USE OF ANTICOAGULANT THERAPY: ICD-10-CM

## 2022-02-23 DIAGNOSIS — Z79.01 LONG TERM CURRENT USE OF ANTICOAGULANT THERAPY: Primary | ICD-10-CM

## 2022-02-23 LAB
INR PPP: 1.9 (ref 0.8–1.2)
PROTHROMBIN TIME: 19.3 SEC (ref 9–12.5)

## 2022-02-23 PROCEDURE — 99211 PR OFFICE/OUTPT VISIT, EST, LEVL I: ICD-10-PCS | Mod: S$GLB,,, | Performed by: INTERNAL MEDICINE

## 2022-02-23 PROCEDURE — 85610 PROTHROMBIN TIME: CPT | Mod: HCNC | Performed by: INTERNAL MEDICINE

## 2022-02-23 PROCEDURE — 99211 OFF/OP EST MAY X REQ PHY/QHP: CPT | Mod: S$GLB,,, | Performed by: INTERNAL MEDICINE

## 2022-02-23 PROCEDURE — 36415 COLL VENOUS BLD VENIPUNCTURE: CPT | Mod: HCNC | Performed by: INTERNAL MEDICINE

## 2022-02-23 NOTE — PROGRESS NOTES
Wife was given lab result, verified correct coumadin dose, reports no changes, wife was given coumadin instructions and next lab date, verbalized understanding

## 2022-03-10 ENCOUNTER — PATIENT OUTREACH (OUTPATIENT)
Dept: ADMINISTRATIVE | Facility: OTHER | Age: 82
End: 2022-03-10
Payer: MEDICARE

## 2022-03-11 ENCOUNTER — ANTI-COAG VISIT (OUTPATIENT)
Dept: CARDIOLOGY | Facility: CLINIC | Age: 82
End: 2022-03-11
Payer: MEDICARE

## 2022-03-11 ENCOUNTER — LAB VISIT (OUTPATIENT)
Dept: LAB | Facility: HOSPITAL | Age: 82
End: 2022-03-11
Attending: INTERNAL MEDICINE
Payer: MEDICARE

## 2022-03-11 ENCOUNTER — OFFICE VISIT (OUTPATIENT)
Dept: NEPHROLOGY | Facility: CLINIC | Age: 82
End: 2022-03-11
Payer: MEDICARE

## 2022-03-11 VITALS
DIASTOLIC BLOOD PRESSURE: 60 MMHG | HEART RATE: 60 BPM | HEIGHT: 66 IN | OXYGEN SATURATION: 97 % | SYSTOLIC BLOOD PRESSURE: 100 MMHG | WEIGHT: 185.19 LBS | BODY MASS INDEX: 29.76 KG/M2

## 2022-03-11 DIAGNOSIS — Z95.2 H/O MECHANICAL AORTIC VALVE REPLACEMENT: ICD-10-CM

## 2022-03-11 DIAGNOSIS — Z79.01 LONG TERM CURRENT USE OF ANTICOAGULANT THERAPY: Primary | ICD-10-CM

## 2022-03-11 DIAGNOSIS — N18.32 STAGE 3B CHRONIC KIDNEY DISEASE: Primary | ICD-10-CM

## 2022-03-11 DIAGNOSIS — N18.32 ANEMIA OF CHRONIC RENAL FAILURE, STAGE 3B: ICD-10-CM

## 2022-03-11 DIAGNOSIS — Z79.01 LONG TERM CURRENT USE OF ANTICOAGULANT THERAPY: ICD-10-CM

## 2022-03-11 DIAGNOSIS — D63.1 ANEMIA OF CHRONIC RENAL FAILURE, STAGE 3B: ICD-10-CM

## 2022-03-11 LAB
INR PPP: 1.6 (ref 0.8–1.2)
PROTHROMBIN TIME: 15.9 SEC (ref 9–12.5)

## 2022-03-11 PROCEDURE — 1126F AMNT PAIN NOTED NONE PRSNT: CPT | Mod: HCNC,CPTII,S$GLB, | Performed by: INTERNAL MEDICINE

## 2022-03-11 PROCEDURE — 3078F PR MOST RECENT DIASTOLIC BLOOD PRESSURE < 80 MM HG: ICD-10-PCS | Mod: HCNC,CPTII,S$GLB, | Performed by: INTERNAL MEDICINE

## 2022-03-11 PROCEDURE — 3074F PR MOST RECENT SYSTOLIC BLOOD PRESSURE < 130 MM HG: ICD-10-PCS | Mod: HCNC,CPTII,S$GLB, | Performed by: INTERNAL MEDICINE

## 2022-03-11 PROCEDURE — 99999 PR PBB SHADOW E&M-EST. PATIENT-LVL III: ICD-10-PCS | Mod: PBBFAC,HCNC,, | Performed by: INTERNAL MEDICINE

## 2022-03-11 PROCEDURE — 3288F PR FALLS RISK ASSESSMENT DOCUMENTED: ICD-10-PCS | Mod: HCNC,CPTII,S$GLB, | Performed by: INTERNAL MEDICINE

## 2022-03-11 PROCEDURE — 36415 COLL VENOUS BLD VENIPUNCTURE: CPT | Mod: HCNC | Performed by: INTERNAL MEDICINE

## 2022-03-11 PROCEDURE — 1126F PR PAIN SEVERITY QUANTIFIED, NO PAIN PRESENT: ICD-10-PCS | Mod: HCNC,CPTII,S$GLB, | Performed by: INTERNAL MEDICINE

## 2022-03-11 PROCEDURE — 99214 OFFICE O/P EST MOD 30 MIN: CPT | Mod: HCNC,S$GLB,, | Performed by: INTERNAL MEDICINE

## 2022-03-11 PROCEDURE — 99211 PR OFFICE/OUTPT VISIT, EST, LEVL I: ICD-10-PCS | Mod: S$GLB,,, | Performed by: INTERNAL MEDICINE

## 2022-03-11 PROCEDURE — 3072F LOW RISK FOR RETINOPATHY: CPT | Mod: HCNC,CPTII,S$GLB, | Performed by: INTERNAL MEDICINE

## 2022-03-11 PROCEDURE — 99999 PR PBB SHADOW E&M-EST. PATIENT-LVL III: CPT | Mod: PBBFAC,HCNC,, | Performed by: INTERNAL MEDICINE

## 2022-03-11 PROCEDURE — 99211 OFF/OP EST MAY X REQ PHY/QHP: CPT | Mod: S$GLB,,, | Performed by: INTERNAL MEDICINE

## 2022-03-11 PROCEDURE — 3074F SYST BP LT 130 MM HG: CPT | Mod: HCNC,CPTII,S$GLB, | Performed by: INTERNAL MEDICINE

## 2022-03-11 PROCEDURE — 1101F PR PT FALLS ASSESS DOC 0-1 FALLS W/OUT INJ PAST YR: ICD-10-PCS | Mod: HCNC,CPTII,S$GLB, | Performed by: INTERNAL MEDICINE

## 2022-03-11 PROCEDURE — 3288F FALL RISK ASSESSMENT DOCD: CPT | Mod: HCNC,CPTII,S$GLB, | Performed by: INTERNAL MEDICINE

## 2022-03-11 PROCEDURE — 85610 PROTHROMBIN TIME: CPT | Mod: HCNC | Performed by: INTERNAL MEDICINE

## 2022-03-11 PROCEDURE — 1101F PT FALLS ASSESS-DOCD LE1/YR: CPT | Mod: HCNC,CPTII,S$GLB, | Performed by: INTERNAL MEDICINE

## 2022-03-11 PROCEDURE — 99214 PR OFFICE/OUTPT VISIT, EST, LEVL IV, 30-39 MIN: ICD-10-PCS | Mod: HCNC,S$GLB,, | Performed by: INTERNAL MEDICINE

## 2022-03-11 PROCEDURE — 3072F PR LOW RISK FOR RETINOPATHY: ICD-10-PCS | Mod: HCNC,CPTII,S$GLB, | Performed by: INTERNAL MEDICINE

## 2022-03-11 PROCEDURE — 3078F DIAST BP <80 MM HG: CPT | Mod: HCNC,CPTII,S$GLB, | Performed by: INTERNAL MEDICINE

## 2022-03-11 NOTE — PROGRESS NOTES
Health Maintenance Due   Topic Date Due    Shingles Vaccine (1 of 2) Never done    Eye Exam  03/04/2022     Updates were requested from care everywhere.  Chart was reviewed for overdue Proactive [facility] Encounters ([facility]) topics (CRS, Breast Cancer Screening, Eye exam)  Health Maintenance has been updated.  LINKS immunization registry triggered.  Immunizations were reconciled.

## 2022-03-11 NOTE — PROGRESS NOTES
Subjective:       Patient ID: Dariel Urbina is an 81 y.o. White male who presents for follow-up evaluation of Chronic Kidney Disease    HPI    Mr. Urbina is an 81 year old man with past medical history of hypertension presenting for follow up of chronic kidney disease.  Patient last seen in 2019.  Patient recently noted to have JIM during admission for epistaxis, also with recent COVID infection.  Patient reports chronic shortness of breath with exertion, otherwise denies any symptoms, no fever, chest pain, abdominal pain, diarrhea, dysuria/hematuria. He reports blood pressure usually well-controlled, less than 130 systolic.  He reports adherence to low potassium diet most of the time.    Review of Systems   Constitutional: Negative for appetite change, fatigue and fever.   Respiratory: Negative for cough and shortness of breath.    Cardiovascular: Negative for chest pain and leg swelling.   Gastrointestinal: Negative for abdominal pain, constipation, diarrhea, nausea and vomiting.   Genitourinary: Negative for dysuria, flank pain, frequency, hematuria and urgency.   Musculoskeletal: Negative for arthralgias, back pain and joint swelling.   Skin: Negative for rash.   Neurological: Negative for dizziness and light-headedness.   All other systems reviewed and are negative.      Objective:      Physical Exam  Vitals reviewed.   Constitutional:       General: He is not in acute distress.     Appearance: He is well-developed.   Pulmonary:      Effort: Pulmonary effort is normal. No respiratory distress.   Musculoskeletal:         General: No swelling.   Neurological:      Mental Status: He is alert.         Assessment:       1. Stage 3b chronic kidney disease    2. Anemia of chronic renal failure, stage 3b        Plan:      Mr. Urbina is an 81 year old man with past medical history of hypertension presenting for follow up of chronic kidney disease stage IIIb.  Creatinine elevated from baseline range since  December 2021, though relatively stable (suspect acute kidney injury due to recent acute issues), will continue to trend for now.  Stressed importance of blood pressure/glycemic control to prevent any further progression of kidney disease, patient voiced understanding.  Encouraged weight loss and exercise as tolerated, along with fluid intake.   - Hypertension: blood pressure at goal, continue current meds   - Anemia: Hgb below goal, recently admitted with epistaxis, will repeat CBC/iron studies to trend, consider erythropoetin therapy  - Bone/mineral metabolism: patient with secondary hyperparathyroidism, PTH at goal for stage CKD  - Hyperkalemia: improved, again discussed low potassium diet, patient previously given handout.      Return to clinic 2-3 months pending renal panel, then with renal/heme panel, iron/TIBC/ferritin, urinalysis/culture, urine protein/creatinine ratio prior to next visit

## 2022-03-15 ENCOUNTER — LAB VISIT (OUTPATIENT)
Dept: LAB | Facility: HOSPITAL | Age: 82
End: 2022-03-15
Attending: INTERNAL MEDICINE
Payer: MEDICARE

## 2022-03-15 ENCOUNTER — ANTI-COAG VISIT (OUTPATIENT)
Dept: CARDIOLOGY | Facility: CLINIC | Age: 82
End: 2022-03-15
Payer: MEDICARE

## 2022-03-15 DIAGNOSIS — Z79.01 LONG TERM CURRENT USE OF ANTICOAGULANT THERAPY: Primary | ICD-10-CM

## 2022-03-15 DIAGNOSIS — Z95.2 H/O MECHANICAL AORTIC VALVE REPLACEMENT: ICD-10-CM

## 2022-03-15 DIAGNOSIS — Z79.01 LONG TERM CURRENT USE OF ANTICOAGULANT THERAPY: ICD-10-CM

## 2022-03-15 LAB
INR PPP: 2 (ref 0.8–1.2)
PROTHROMBIN TIME: 20.6 SEC (ref 9–12.5)

## 2022-03-15 PROCEDURE — 99211 OFF/OP EST MAY X REQ PHY/QHP: CPT | Mod: S$GLB,,, | Performed by: INTERNAL MEDICINE

## 2022-03-15 PROCEDURE — 99211 PR OFFICE/OUTPT VISIT, EST, LEVL I: ICD-10-PCS | Mod: S$GLB,,, | Performed by: INTERNAL MEDICINE

## 2022-03-15 PROCEDURE — 36415 COLL VENOUS BLD VENIPUNCTURE: CPT | Mod: HCNC | Performed by: INTERNAL MEDICINE

## 2022-03-15 PROCEDURE — 85610 PROTHROMBIN TIME: CPT | Mod: HCNC | Performed by: INTERNAL MEDICINE

## 2022-03-29 ENCOUNTER — LAB VISIT (OUTPATIENT)
Dept: LAB | Facility: HOSPITAL | Age: 82
End: 2022-03-29
Attending: INTERNAL MEDICINE
Payer: MEDICARE

## 2022-03-29 ENCOUNTER — ANTI-COAG VISIT (OUTPATIENT)
Dept: CARDIOLOGY | Facility: CLINIC | Age: 82
End: 2022-03-29
Payer: MEDICARE

## 2022-03-29 DIAGNOSIS — Z79.01 LONG TERM CURRENT USE OF ANTICOAGULANT THERAPY: Primary | ICD-10-CM

## 2022-03-29 DIAGNOSIS — Z95.2 H/O MECHANICAL AORTIC VALVE REPLACEMENT: ICD-10-CM

## 2022-03-29 DIAGNOSIS — Z79.01 LONG TERM CURRENT USE OF ANTICOAGULANT THERAPY: ICD-10-CM

## 2022-03-29 LAB
INR PPP: 1.8 (ref 0.8–1.2)
PROTHROMBIN TIME: 18 SEC (ref 9–12.5)

## 2022-03-29 PROCEDURE — 99211 PR OFFICE/OUTPT VISIT, EST, LEVL I: ICD-10-PCS | Mod: S$GLB,,, | Performed by: INTERNAL MEDICINE

## 2022-03-29 PROCEDURE — 36415 COLL VENOUS BLD VENIPUNCTURE: CPT | Performed by: INTERNAL MEDICINE

## 2022-03-29 PROCEDURE — 99211 OFF/OP EST MAY X REQ PHY/QHP: CPT | Mod: S$GLB,,, | Performed by: INTERNAL MEDICINE

## 2022-03-29 PROCEDURE — 85610 PROTHROMBIN TIME: CPT | Performed by: INTERNAL MEDICINE

## 2022-04-12 ENCOUNTER — LAB VISIT (OUTPATIENT)
Dept: LAB | Facility: HOSPITAL | Age: 82
End: 2022-04-12
Attending: INTERNAL MEDICINE
Payer: MEDICARE

## 2022-04-12 ENCOUNTER — ANTI-COAG VISIT (OUTPATIENT)
Dept: CARDIOLOGY | Facility: CLINIC | Age: 82
End: 2022-04-12
Payer: MEDICARE

## 2022-04-12 DIAGNOSIS — Z95.2 H/O MECHANICAL AORTIC VALVE REPLACEMENT: ICD-10-CM

## 2022-04-12 DIAGNOSIS — Z79.01 LONG TERM CURRENT USE OF ANTICOAGULANT THERAPY: Primary | ICD-10-CM

## 2022-04-12 DIAGNOSIS — Z79.01 LONG TERM CURRENT USE OF ANTICOAGULANT THERAPY: ICD-10-CM

## 2022-04-12 LAB
INR PPP: 3 (ref 0.8–1.2)
PROTHROMBIN TIME: 29.8 SEC (ref 9–12.5)

## 2022-04-12 PROCEDURE — 93793 PR ANTICOAGULANT MGMT FOR PT TAKING WARFARIN: ICD-10-PCS | Mod: S$GLB,,,

## 2022-04-12 PROCEDURE — 93793 ANTICOAG MGMT PT WARFARIN: CPT | Mod: S$GLB,,,

## 2022-04-12 PROCEDURE — 36415 COLL VENOUS BLD VENIPUNCTURE: CPT | Performed by: INTERNAL MEDICINE

## 2022-04-12 PROCEDURE — 85610 PROTHROMBIN TIME: CPT | Performed by: INTERNAL MEDICINE

## 2022-04-26 ENCOUNTER — ANTI-COAG VISIT (OUTPATIENT)
Dept: CARDIOLOGY | Facility: CLINIC | Age: 82
End: 2022-04-26
Payer: MEDICARE

## 2022-04-26 ENCOUNTER — LAB VISIT (OUTPATIENT)
Dept: LAB | Facility: HOSPITAL | Age: 82
End: 2022-04-26
Attending: INTERNAL MEDICINE
Payer: MEDICARE

## 2022-04-26 DIAGNOSIS — Z79.01 LONG TERM CURRENT USE OF ANTICOAGULANT THERAPY: ICD-10-CM

## 2022-04-26 DIAGNOSIS — Z79.01 LONG TERM CURRENT USE OF ANTICOAGULANT THERAPY: Primary | ICD-10-CM

## 2022-04-26 DIAGNOSIS — Z95.2 H/O MECHANICAL AORTIC VALVE REPLACEMENT: ICD-10-CM

## 2022-04-26 LAB
INR PPP: 2.7 (ref 0.8–1.2)
PROTHROMBIN TIME: 27.2 SEC (ref 9–12.5)

## 2022-04-26 PROCEDURE — 93793 PR ANTICOAGULANT MGMT FOR PT TAKING WARFARIN: ICD-10-PCS | Mod: S$GLB,,,

## 2022-04-26 PROCEDURE — 85610 PROTHROMBIN TIME: CPT | Performed by: INTERNAL MEDICINE

## 2022-04-26 PROCEDURE — 36415 COLL VENOUS BLD VENIPUNCTURE: CPT | Performed by: INTERNAL MEDICINE

## 2022-04-26 PROCEDURE — 93793 ANTICOAG MGMT PT WARFARIN: CPT | Mod: S$GLB,,,

## 2022-05-03 DIAGNOSIS — I15.0 RENOVASCULAR HYPERTENSION: ICD-10-CM

## 2022-05-03 NOTE — TELEPHONE ENCOUNTER
----- Message from Yudith Albarran sent at 5/3/2022  8:31 AM CDT -----  Contact: [phone]  Requesting an RX refill or new RX.  Is this a refill or new RX: refill  RX name and strength (copy/paste from chart):  carvediloL (COREG) 12.5 MG tablet  Is this a 30 day or 90 day RX: 90 day   Pharmacy name and phone # (copy/paste from chart):    [facility] - [place], [place] - [address]  [address] [facility] [address]  Phone: [phone] Fax: [phone]  The doctors have asked that we provide their patients with the following 2 reminders -- prescription refills can take up to 72 hours, and a friendly reminder that in the future you can use your [facility] account to request refills: aware

## 2022-05-03 NOTE — TELEPHONE ENCOUNTER
Care Due:                  Date            Visit Type   Department     Provider  --------------------------------------------------------------------------------                                HOSPITAL     [facility] INTERNAL  Last Visit: 02-      FOLLOW UP    MEDICINE       Devon Langston                               -                              PRIMARY      [facility]  Next Visit: 05-      CARE ([facility])   MEDICINE       Devon Langston                                                            Last  Test          Frequency    Reason                     Performed    Due Date  --------------------------------------------------------------------------------    Uric Acid...  12 months..  allopurinoL..............  Not Found    Overdue    Powered by [employer]. Reference number: [id number].   5/03/2022 8:34:36 AM CDT

## 2022-05-04 RX ORDER — CARVEDILOL 12.5 MG/1
12.5 TABLET ORAL 2 TIMES DAILY
Qty: 180 TABLET | Refills: 3 | Status: SHIPPED | OUTPATIENT
Start: 2022-05-04 | End: 2022-08-17 | Stop reason: SDUPTHER

## 2022-05-10 ENCOUNTER — OFFICE VISIT (OUTPATIENT)
Dept: INTERNAL MEDICINE | Facility: CLINIC | Age: 82
End: 2022-05-10
Payer: MEDICARE

## 2022-05-10 ENCOUNTER — IMMUNIZATION (OUTPATIENT)
Dept: INTERNAL MEDICINE | Facility: CLINIC | Age: 82
End: 2022-05-10
Payer: MEDICARE

## 2022-05-10 VITALS
DIASTOLIC BLOOD PRESSURE: 58 MMHG | HEART RATE: 85 BPM | SYSTOLIC BLOOD PRESSURE: 102 MMHG | HEIGHT: 66 IN | OXYGEN SATURATION: 97 % | WEIGHT: 185.19 LBS | BODY MASS INDEX: 29.76 KG/M2

## 2022-05-10 DIAGNOSIS — Z23 NEED FOR VACCINATION: Primary | ICD-10-CM

## 2022-05-10 DIAGNOSIS — Z95.2 H/O MECHANICAL AORTIC VALVE REPLACEMENT: Primary | ICD-10-CM

## 2022-05-10 DIAGNOSIS — E11.51 TYPE 2 DIABETES MELLITUS WITH DIABETIC PERIPHERAL ANGIOPATHY WITHOUT GANGRENE, WITHOUT LONG-TERM CURRENT USE OF INSULIN: ICD-10-CM

## 2022-05-10 PROCEDURE — 99999 PR PBB SHADOW E&M-EST. PATIENT-LVL III: CPT | Mod: PBBFAC,,, | Performed by: INTERNAL MEDICINE

## 2022-05-10 PROCEDURE — 99499 UNLISTED E&M SERVICE: CPT | Mod: S$GLB,,, | Performed by: INTERNAL MEDICINE

## 2022-05-10 PROCEDURE — 3074F SYST BP LT 130 MM HG: CPT | Mod: CPTII,S$GLB,, | Performed by: INTERNAL MEDICINE

## 2022-05-10 PROCEDURE — 1101F PT FALLS ASSESS-DOCD LE1/YR: CPT | Mod: CPTII,S$GLB,, | Performed by: INTERNAL MEDICINE

## 2022-05-10 PROCEDURE — 3072F LOW RISK FOR RETINOPATHY: CPT | Mod: CPTII,S$GLB,, | Performed by: INTERNAL MEDICINE

## 2022-05-10 PROCEDURE — 1159F MED LIST DOCD IN RCRD: CPT | Mod: CPTII,S$GLB,, | Performed by: INTERNAL MEDICINE

## 2022-05-10 PROCEDURE — 3288F PR FALLS RISK ASSESSMENT DOCUMENTED: ICD-10-PCS | Mod: CPTII,S$GLB,, | Performed by: INTERNAL MEDICINE

## 2022-05-10 PROCEDURE — 91300 COVID-19, MRNA, LNP-S, PF, 30 MCG/0.3 ML DOSE VACCINE: CPT | Mod: PBBFAC | Performed by: INTERNAL MEDICINE

## 2022-05-10 PROCEDURE — 99214 OFFICE O/P EST MOD 30 MIN: CPT | Mod: S$GLB,,, | Performed by: INTERNAL MEDICINE

## 2022-05-10 PROCEDURE — 99499 RISK ADDL DX/OHS AUDIT: ICD-10-PCS | Mod: S$GLB,,, | Performed by: INTERNAL MEDICINE

## 2022-05-10 PROCEDURE — 3078F PR MOST RECENT DIASTOLIC BLOOD PRESSURE < 80 MM HG: ICD-10-PCS | Mod: CPTII,S$GLB,, | Performed by: INTERNAL MEDICINE

## 2022-05-10 PROCEDURE — 99214 PR OFFICE/OUTPT VISIT, EST, LEVL IV, 30-39 MIN: ICD-10-PCS | Mod: S$GLB,,, | Performed by: INTERNAL MEDICINE

## 2022-05-10 PROCEDURE — 3074F PR MOST RECENT SYSTOLIC BLOOD PRESSURE < 130 MM HG: ICD-10-PCS | Mod: CPTII,S$GLB,, | Performed by: INTERNAL MEDICINE

## 2022-05-10 PROCEDURE — 3078F DIAST BP <80 MM HG: CPT | Mod: CPTII,S$GLB,, | Performed by: INTERNAL MEDICINE

## 2022-05-10 PROCEDURE — 1126F AMNT PAIN NOTED NONE PRSNT: CPT | Mod: CPTII,S$GLB,, | Performed by: INTERNAL MEDICINE

## 2022-05-10 PROCEDURE — 1159F PR MEDICATION LIST DOCUMENTED IN MEDICAL RECORD: ICD-10-PCS | Mod: CPTII,S$GLB,, | Performed by: INTERNAL MEDICINE

## 2022-05-10 PROCEDURE — 3072F PR LOW RISK FOR RETINOPATHY: ICD-10-PCS | Mod: CPTII,S$GLB,, | Performed by: INTERNAL MEDICINE

## 2022-05-10 PROCEDURE — 3288F FALL RISK ASSESSMENT DOCD: CPT | Mod: CPTII,S$GLB,, | Performed by: INTERNAL MEDICINE

## 2022-05-10 PROCEDURE — 1101F PR PT FALLS ASSESS DOC 0-1 FALLS W/OUT INJ PAST YR: ICD-10-PCS | Mod: CPTII,S$GLB,, | Performed by: INTERNAL MEDICINE

## 2022-05-10 PROCEDURE — 99999 PR PBB SHADOW E&M-EST. PATIENT-LVL III: ICD-10-PCS | Mod: PBBFAC,,, | Performed by: INTERNAL MEDICINE

## 2022-05-10 PROCEDURE — 1126F PR PAIN SEVERITY QUANTIFIED, NO PAIN PRESENT: ICD-10-PCS | Mod: CPTII,S$GLB,, | Performed by: INTERNAL MEDICINE

## 2022-05-10 NOTE — PROGRESS NOTES
[age] yo M here for follow up his medical problems.   I saw him in Feb 2022.             He has  diabetes, hypertension, CAD, HLD, gout and mechanical aortic valve who Had epistaxis earlier this year .  He was hospitalized late in January and then again in early February for nose bleed they could not stop.  He is on both Coumadin and Plavix any has history of anemia.  His last cardiovascular stent was in November of 2021. He did see ENT yesterday and they cauterized his nose he said he felt better and slept well last night.  He has had a couple nose bleeds but they are better.        He had COVID 3  Months  ago.  He came to the hospital he was given Remdesiver.  He says that he has pretty much recovered from the COVID.  He denies any shortness of breath or cough.          Diabetes mellitus type 2. Last A1c was  5.7   -- . He was put on amaryl 1mg last visit.  .  . Weight today is 1 85 # lbs-  Weight   is down 1 lb Since last visit. . Patient denies polyuria, polydipsia, visual disturbances.         Hyperlipidemia. On lipitor 40 mg , lovaza. And Fenofibrate --Recent lipid panel was reviewed.  . He says he is taking all his cholesterol meds. Chol 103, hdl 34, Ldl 43.4.         HTN. On monopril, lasix, imdur, lopressor. Pt does not measure BPs at home. BP today is 102/58 . HIs  ACE was stopped due to JIM and hyperkalemia. K=5.1.  He see nephrology in August- recent creatine was 2.3 -- K was 5.1 .          CKD stage III. Pt saw Nephrology in March 2022 -- than note was reviewed.  He has had some acute on chronic kidney failure.  Most recent creatinine is 2.3 last month   I year ago his creatinine was 2.3.  He has had several creatinines during hospitalization in the low threes.  His potasium is  5.1. He has been instructed on a a low potasium diet by nephrology, but is not following one.  Dr Carpenter retired and thye want helpi picking out new nnephrologits.          H/o partial colon resection due to  diverticulosis. See above.  NO GI  bleeding since last visit.  NO recent diarrhea.          .    H/o CAD s/p CABG 2002, multiple stents, aortic valve replacement on coumadin. He saws he been having  chest pain for about 4 years ( not really per him)-- he has limited activity .   He had another stent placed back in November 2021 a zone Plavix.  He also went to cardiac rehab.--  . He has LÓPEZ but this is chronic and some left sided claudication.   He has not had any chest pains recently.  He says the pain he is having is a burning in the throat. It has not changed in 3 years.        AAA- last us Showed 3.32 cm AAA- (3/2018 )largest supra renal area. since last visit -            H/o gout. On allopurinol. Last flare was 18 mo ago in left foot. He remains on Allopurinol.      Valvular heart disease- with mechanical AV, some MVR and some TVR-- NO NO recent chest pains .  Reviewed recent PET stress test.   SOB with walking 75 feet- unchanged since last visit, and the one previous to that.                  Review of Systems    Constitutional: Negative for fever, chills and unexpected weight change.    HENT: Negative for congestion, rhinorrhea and sinus pressure.    Eyes: Negative for visual disturbance.    Respiratory: He Has some SOB, but this is unchanged. He can mow the lawn with walk behind mower.    This has not changed since last visit.    Cardiovascular:as above.  .    Gastrointestinal: Negative for nausea, vomiting, abdominal pain, and constipation.  He does have some diarrhea-  - as above.    Genitourinary: Negative for dysuria, urgency and difficulty urinating.    Musculoskeletal: He reports his back- that was bothering him last vsiit- is not bothering him recently.    Skin: Negative.    Neurological: Negative for dizziness, syncope and headaches.    Hematological: Negative.    Psychiatric/Behavioral: Negative.    All other systems reviewed and are negative.    Objective:              BP (!) 102/58 (BP Location:  "Left arm, Patient Position: Sitting, BP Method: Large (Manual))   Pulse 85   Ht 5' 6" (1.676 m)   Wt 84 kg (185 lb 3 oz)   SpO2 97%   BMI 29.89 kg/m²        Constitutional: He is oriented to person, place, and time. He appears well-developed and well-nourished. No distress.    HENT:    Head: Normocephalic and atraumatic. He has a benign lesion on his left check.    Mouth/Throat: Oropharynx is clear and moist. No oropharyngeal exudate.    Eyes: Conjunctivae and EOM are normal.    Neck: Normal range of motion. Neck supple. No JVD present. No tracheal deviation present.    Cardiovascular: Regular rhythm and normal heart sounds. Exam reveals no gallop and no friction rub.    Systolic murmur heard.     Pulmonary/Chest: Effort normal and breath sounds normal. No respiratory distress. He has no wheezes. He has no rales.    Abdominal: Soft. Bowel sounds are normal. He exhibits no distension. There is no tenderness. There is no rebound.   Musculoskeletal: Normal range of motion. He exhibits no edema and no tenderness.   Neurological: He is alert and oriented to person, place, and time.    Skin: Skin is warm and dry. No rash noted. He is not diaphoretic. No erythema.   Psychiatric: He has a normal mood and affect. His behavior is normal.                                     Assessment:      1.   DM (diabetes mellitus)     2.   Hyperlipidemia     3.   Chronic kidney disease, stage III (moderate)     4.   History of colon resection     5.   Hypertension     6.   CAD (coronary atherosclerotic disease)     7.    8.  9.  10.  History of aortic valve repair    AAA  [facility]- on coumadin   Colon polyps--     Plan:         Chronic kidney disease (CKD), stage IV (severe)  -     PTH, Intact; Future; Expected date: 02/10/2022     Morbid obesity     Pulmonary heart disease     Anemia, unspecified type     Claudication in peripheral vascular disease     Epistaxis     LÓPEZ (dyspnea on exertion)     Hyperlipidemia associated with type 2 " diabetes mellitus  -     Lipid Panel; Future; Expected date: 02/10/2022  -     CBC Auto Differential; Future; Expected date: 02/10/2022     Hyperparathyroidism due to renal insufficiency     Type 2 diabetes mellitus with diabetic peripheral angiopathy without gangrene, without long-term current use of insulin  -     Comprehensive Metabolic Panel; Future; Expected date: 02/10/2022  -     Hemoglobin A1C; Future; Expected date: 02/10/2022  -     Ambulatory referral/consult to Nephrology; Future; Expected date: 02/17/2022           Also going to have him see Nephrology since he is past due to follow-up with them.  Reviewed his anemia his recent labs.  He is on both Coumadin for his heart valve issues and he is on Plavix because of the stent he had but back in on November 2021. No sequelae epistaxis seen at the current time.       WIll follow up in6 months   Dr Ru rahmanred and it looks like they are being charged for coumadin clinic.  I will try to contact coumadin clinic and straight the is out.  It would not let me put in new referral for coumaidn clinic.

## 2022-05-13 ENCOUNTER — TELEPHONE (OUTPATIENT)
Dept: INTERNAL MEDICINE | Facility: CLINIC | Age: 82
End: 2022-05-13
Payer: MEDICARE

## 2022-05-13 NOTE — TELEPHONE ENCOUNTER
He can take the amoxicillin.  The biggest thing is he needs to see a dentist though.  For the pain he can try some Tylenol but it sounds like he may need to see it did to his on emergency basis.

## 2022-05-13 NOTE — TELEPHONE ENCOUNTER
Pt wife stated that he has a tooth in the back that's loose and connected to his partial. Its hurting him really bad. She wants to know if he can take amoxicillin because she has a left over rx from when she had a root canal??? Or can you send something to the closest pharmacy for pain. Please advise.     Lory GALLAGHER

## 2022-05-13 NOTE — TELEPHONE ENCOUNTER
----- Message from Reny Shook sent at 5/13/2022  7:44 AM CDT -----  Contact: [phone]  .1 Patient would like to get medical advice.  Symptoms (please be specific): tooth abscess, patient is on coumadin.   How long has patient had these symptoms: 05/12/22  Pharmacy name and phone#:   Any drug allergies: on file  Comments: Patient would like to get medical advice while he see the dentist. Patient is in [place], please call and advise. Thank you

## 2022-05-17 ENCOUNTER — ANTI-COAG VISIT (OUTPATIENT)
Dept: CARDIOLOGY | Facility: CLINIC | Age: 82
End: 2022-05-17
Payer: MEDICARE

## 2022-05-17 ENCOUNTER — PATIENT MESSAGE (OUTPATIENT)
Dept: CARDIOLOGY | Facility: CLINIC | Age: 82
End: 2022-05-17

## 2022-05-17 ENCOUNTER — LAB VISIT (OUTPATIENT)
Dept: LAB | Facility: HOSPITAL | Age: 82
End: 2022-05-17
Attending: INTERNAL MEDICINE
Payer: MEDICARE

## 2022-05-17 DIAGNOSIS — Z79.01 LONG TERM CURRENT USE OF ANTICOAGULANT THERAPY: Primary | ICD-10-CM

## 2022-05-17 DIAGNOSIS — Z95.2 H/O MECHANICAL AORTIC VALVE REPLACEMENT: ICD-10-CM

## 2022-05-17 DIAGNOSIS — Z79.01 LONG TERM CURRENT USE OF ANTICOAGULANT THERAPY: ICD-10-CM

## 2022-05-17 LAB
INR PPP: 2.3 (ref 0.8–1.2)
PROTHROMBIN TIME: 22.9 SEC (ref 9–12.5)

## 2022-05-17 PROCEDURE — 36415 COLL VENOUS BLD VENIPUNCTURE: CPT | Performed by: INTERNAL MEDICINE

## 2022-05-17 PROCEDURE — 93793 PR ANTICOAGULANT MGMT FOR PT TAKING WARFARIN: ICD-10-PCS | Mod: S$GLB,,,

## 2022-05-17 PROCEDURE — 85610 PROTHROMBIN TIME: CPT | Performed by: INTERNAL MEDICINE

## 2022-05-17 PROCEDURE — 93793 ANTICOAG MGMT PT WARFARIN: CPT | Mod: S$GLB,,,

## 2022-05-17 NOTE — PROGRESS NOTES
Wife called and reported Patient has an abscess and since 3 days ago has been taking Amoxicillin -500mg twice a day for the rest of this week, will be calling Dentist on Monday and may need to have teeth extracted, advised wife to call Coumadin Clinic after call to Dentist

## 2022-06-01 ENCOUNTER — LAB VISIT (OUTPATIENT)
Dept: LAB | Facility: HOSPITAL | Age: 82
End: 2022-06-01
Attending: INTERNAL MEDICINE
Payer: MEDICARE

## 2022-06-01 ENCOUNTER — ANTI-COAG VISIT (OUTPATIENT)
Dept: CARDIOLOGY | Facility: CLINIC | Age: 82
End: 2022-06-01
Payer: MEDICARE

## 2022-06-01 ENCOUNTER — OFFICE VISIT (OUTPATIENT)
Dept: CARDIOLOGY | Facility: CLINIC | Age: 82
End: 2022-06-01
Payer: MEDICARE

## 2022-06-01 VITALS
OXYGEN SATURATION: 97 % | DIASTOLIC BLOOD PRESSURE: 52 MMHG | SYSTOLIC BLOOD PRESSURE: 100 MMHG | HEIGHT: 66 IN | HEART RATE: 57 BPM | BODY MASS INDEX: 29.12 KG/M2 | WEIGHT: 181.19 LBS

## 2022-06-01 DIAGNOSIS — Z95.2 H/O MECHANICAL AORTIC VALVE REPLACEMENT: ICD-10-CM

## 2022-06-01 DIAGNOSIS — Z79.01 LONG TERM CURRENT USE OF ANTICOAGULANT THERAPY: Primary | ICD-10-CM

## 2022-06-01 DIAGNOSIS — I25.708 CORONARY ARTERY DISEASE OF BYPASS GRAFT OF NATIVE HEART WITH STABLE ANGINA PECTORIS: Primary | ICD-10-CM

## 2022-06-01 DIAGNOSIS — N18.32 STAGE 3B CHRONIC KIDNEY DISEASE: ICD-10-CM

## 2022-06-01 DIAGNOSIS — Z79.01 LONG TERM CURRENT USE OF ANTICOAGULANT THERAPY: ICD-10-CM

## 2022-06-01 DIAGNOSIS — I15.2 HYPERTENSION ASSOCIATED WITH DIABETES: ICD-10-CM

## 2022-06-01 DIAGNOSIS — E78.5 HYPERLIPIDEMIA ASSOCIATED WITH TYPE 2 DIABETES MELLITUS: ICD-10-CM

## 2022-06-01 DIAGNOSIS — E11.59 HYPERTENSION ASSOCIATED WITH DIABETES: ICD-10-CM

## 2022-06-01 DIAGNOSIS — I73.9 CLAUDICATION IN PERIPHERAL VASCULAR DISEASE: ICD-10-CM

## 2022-06-01 DIAGNOSIS — E11.69 HYPERLIPIDEMIA ASSOCIATED WITH TYPE 2 DIABETES MELLITUS: ICD-10-CM

## 2022-06-01 LAB
ANION GAP SERPL CALC-SCNC: 10 MMOL/L (ref 8–16)
BUN SERPL-MCNC: 68 MG/DL (ref 8–23)
CALCIUM SERPL-MCNC: 9.8 MG/DL (ref 8.7–10.5)
CHLORIDE SERPL-SCNC: 109 MMOL/L (ref 95–110)
CO2 SERPL-SCNC: 19 MMOL/L (ref 23–29)
CREAT SERPL-MCNC: 3.2 MG/DL (ref 0.5–1.4)
EST. GFR  (AFRICAN AMERICAN): 20 ML/MIN/1.73 M^2
EST. GFR  (NON AFRICAN AMERICAN): 17.3 ML/MIN/1.73 M^2
GLUCOSE SERPL-MCNC: 73 MG/DL (ref 70–110)
INR PPP: 3 (ref 0.8–1.2)
POTASSIUM SERPL-SCNC: 5.1 MMOL/L (ref 3.5–5.1)
PROTHROMBIN TIME: 29.8 SEC (ref 9–12.5)
SODIUM SERPL-SCNC: 138 MMOL/L (ref 136–145)

## 2022-06-01 PROCEDURE — 99499 RISK ADDL DX/OHS AUDIT: ICD-10-PCS | Mod: S$GLB,,, | Performed by: INTERNAL MEDICINE

## 2022-06-01 PROCEDURE — 80048 BASIC METABOLIC PNL TOTAL CA: CPT | Performed by: STUDENT IN AN ORGANIZED HEALTH CARE EDUCATION/TRAINING PROGRAM

## 2022-06-01 PROCEDURE — 1126F PR PAIN SEVERITY QUANTIFIED, NO PAIN PRESENT: ICD-10-PCS | Mod: CPTII,S$GLB,, | Performed by: INTERNAL MEDICINE

## 2022-06-01 PROCEDURE — 3078F DIAST BP <80 MM HG: CPT | Mod: CPTII,S$GLB,, | Performed by: INTERNAL MEDICINE

## 2022-06-01 PROCEDURE — 99214 OFFICE O/P EST MOD 30 MIN: CPT | Mod: GC,S$GLB,, | Performed by: INTERNAL MEDICINE

## 2022-06-01 PROCEDURE — 99999 PR PBB SHADOW E&M-EST. PATIENT-LVL IV: CPT | Mod: PBBFAC,,, | Performed by: INTERNAL MEDICINE

## 2022-06-01 PROCEDURE — 99214 PR OFFICE/OUTPT VISIT, EST, LEVL IV, 30-39 MIN: ICD-10-PCS | Mod: GC,S$GLB,, | Performed by: INTERNAL MEDICINE

## 2022-06-01 PROCEDURE — 3074F SYST BP LT 130 MM HG: CPT | Mod: CPTII,S$GLB,, | Performed by: INTERNAL MEDICINE

## 2022-06-01 PROCEDURE — 3072F LOW RISK FOR RETINOPATHY: CPT | Mod: CPTII,S$GLB,, | Performed by: INTERNAL MEDICINE

## 2022-06-01 PROCEDURE — 3078F PR MOST RECENT DIASTOLIC BLOOD PRESSURE < 80 MM HG: ICD-10-PCS | Mod: CPTII,S$GLB,, | Performed by: INTERNAL MEDICINE

## 2022-06-01 PROCEDURE — 85610 PROTHROMBIN TIME: CPT | Performed by: INTERNAL MEDICINE

## 2022-06-01 PROCEDURE — 1126F AMNT PAIN NOTED NONE PRSNT: CPT | Mod: CPTII,S$GLB,, | Performed by: INTERNAL MEDICINE

## 2022-06-01 PROCEDURE — 3074F PR MOST RECENT SYSTOLIC BLOOD PRESSURE < 130 MM HG: ICD-10-PCS | Mod: CPTII,S$GLB,, | Performed by: INTERNAL MEDICINE

## 2022-06-01 PROCEDURE — 99999 PR PBB SHADOW E&M-EST. PATIENT-LVL IV: ICD-10-PCS | Mod: PBBFAC,,, | Performed by: INTERNAL MEDICINE

## 2022-06-01 PROCEDURE — 99499 UNLISTED E&M SERVICE: CPT | Mod: S$GLB,,, | Performed by: INTERNAL MEDICINE

## 2022-06-01 PROCEDURE — 3072F PR LOW RISK FOR RETINOPATHY: ICD-10-PCS | Mod: CPTII,S$GLB,, | Performed by: INTERNAL MEDICINE

## 2022-06-01 PROCEDURE — 36415 COLL VENOUS BLD VENIPUNCTURE: CPT | Performed by: INTERNAL MEDICINE

## 2022-06-01 NOTE — PROGRESS NOTES
Interventional Cardiology Clinic Note  Reason for Visit: Follow up PAD  Last office visit: 02/09/22    HPI:   Mr. Dariel Urbina is a 81 y.o. gentleman with history of anemia, CKD stage 3, mechanical aortic valve on warfarin, coronary artery disease s/p PCI LM to left circumflex in 11/2021, patent LIMA-LAD, mechanical aortic valve on coumadin, CKD III b who presents for follow up of claudication. He had peripheral angiogram in 10/2021 that showed significant bilateral iliac disease. Patient reports Gustavo III claudication with ambulation of 20 feet in b/l Lower extremities radiation from calf towards buttocks. Pain is relieved with rest. He denies any chest pain but has shortness of breath which has not changed. He denies any PND, orthopnea, leg swelling, dizziness, syncope. He denies any bleeding. He has seen nephrology for his CKD.     ROS:    Review of Systems   Constitutional: Negative for decreased appetite and fever.   HENT: Negative for hoarse voice and nosebleeds.    Eyes: Negative for blurred vision and photophobia.   Cardiovascular: Positive for claudication and dyspnea on exertion. Negative for chest pain, irregular heartbeat, orthopnea and palpitations.   Respiratory: Negative for cough and shortness of breath.    Hematologic/Lymphatic: Negative for bleeding problem. Does not bruise/bleed easily.   Skin: Negative for itching and rash.   Musculoskeletal: Negative for joint swelling and neck pain.   Gastrointestinal: Negative for abdominal pain, hematemesis, hematochezia, nausea and vomiting.   Genitourinary: Negative for hematuria.   Neurological: Negative for light-headedness and seizures.   Psychiatric/Behavioral: Negative for altered mental status. The patient is not nervous/anxious.      PMH:     Past Medical History:   Diagnosis Date    Anemia of chronic renal failure, stage 3 (moderate) 5/27/2015    Anticoagulant long-term use     Atherosclerosis of coronary artery bypass graft of  native heart without angina pectoris 9/11/2012    3-27-18 [facility] Two vessel coronary artery disease.   Prosthetic aortic valve.   Porcelain aorta.   Patent LIMA graft.    Bilateral carotid artery disease 2/9/2017    Bleeding from the nose     Bleeding nose 3/21/2018    Cataract     CKD (chronic kidney disease) stage 3, GFR 30-59 ml/min 5/27/2015    Claudication of left lower extremity 9/17/2014    Colon polyp     Encounter for blood transfusion     Gastroesophageal reflux disease without esophagitis 3/19/2018    Gastrointestinal hemorrhage associated with intestinal diverticulosis 4/1/2018    H/O mechanical aortic valve replacement 09/17/2014    History of gout 9/26/2012    Hyperparathyroidism due to renal insufficiency 7/27/2015    Internal hemorrhoid 4/3/2018    Long term current use of anticoagulant therapy 9/26/2012    Mechanical heart valve present     Metabolic acidosis with normal anion gap and bicarbonate losses 3/20/2018    Mixed hyperlipidemia 9/26/2012    NSTEMI (non-ST elevated myocardial infarction) 3/21/2018    Obesity, diabetes, and hypertension syndrome 2/23/2016    PVD (peripheral vascular disease) 9/11/2012    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/2012    Type 2 diabetes mellitus with diabetic peripheral angiopathy without gangrene 5/27/2015    Type 2 diabetes mellitus with stage 3 chronic kidney disease, without long-term current use of insulin 10/2/2013     Past Surgical History:   Procedure Laterality Date    CARDIAC CATHETERIZATION      CARDIAC VALVE REPLACEMENT      CARDIAC VALVE SURGERY      CARPAL TUNNEL RELEASE Right 5/19/2020    Procedure: RELEASE, CARPAL TUNNEL;  Surgeon: Rupesh Norris Jr., MD;  Location: [facility];  Service: Plastics;  Laterality: Right;    COLON SURGERY      COLONOSCOPY N/A 3/31/2017    Procedure: COLONOSCOPY;  Surgeon: Bruno Raymond MD;  Location: [address]);  Service: Endoscopy;  Laterality: N/A;  Patient's wife requesting date.     COLONOSCOPY N/A 4/3/2018    Procedure: COLONOSCOPY;  Surgeon: Bonifacio Pelletier MD;  Location: [facility] ENDO (2ND FLR);  Service: Endoscopy;  Laterality: N/A;    COLONOSCOPY N/A 8/13/2018    Procedure: COLONOSCOPY;  Surgeon: Kam Barba MD;  Location: [facility] ENDO (2ND FLR);  Service: Endoscopy;  Laterality: N/A;  2nd floor: PA pressure 49; hx of moderate-severe valve disease     per Coumadin clinic-Patient can hold 5 days with lovenox bridge       ok to schedule per Katarina    CORONARY ANGIOGRAPHY N/A 10/4/2021    Procedure: Left heart cath +/- peripheral angiogram;  Surgeon: Jose Ruiz MD;  Location: [facility] CATH LAB;  Service: Cardiology;  Laterality: N/A;    CORONARY ANGIOPLASTY      CORONARY ARTERY BYPASS GRAFT      CORONARY BYPASS GRAFT ANGIOGRAPHY  10/4/2021    Procedure: Bypass graft study;  Surgeon: Jose Ruiz MD;  Location: [facility] CATH LAB;  Service: Cardiology;;    HERNIA REPAIR      SPINE SURGERY      VASECTOMY       Allergies:     Review of patient's allergies indicates:   Allergen Reactions    Fosinopril      Intolerance- elevates potassium level      Losartan      Intolerance- elevates potassium level     Medications:     Current Outpatient Medications on File Prior to Visit   Medication Sig Dispense Refill    allopurinoL (ZYLOPRIM) 100 MG tablet TAKE 1 TABLET EVERY DAY 90 tablet 3    azelastine (ASTELIN) 137 mcg (0.1 %) nasal spray 1 spray by Nasal route 2 (two) times daily.      bumetanide (BUMEX) 2 MG tablet Take 2mg Once to twice daily or as directed (Patient taking differently: Take 2 mg by mouth 2 (two) times daily.) 180 tablet 3    carvediloL (COREG) 12.5 MG tablet Take 1 tablet (12.5 mg total) by mouth 2 (two) times daily. 180 tablet 3    clopidogreL (PLAVIX) 75 mg tablet Take 1 tablet (75 mg total) by mouth once daily. 30 tablet 11    glimepiride (AMARYL) 1 MG tablet Take 1 tablet (1 mg total) by mouth every morning. 90 tablet 3    isosorbide mononitrate (IMDUR) 120 MG 24 hr  tablet TAKE 1 TABLET EVERY DAY 90 tablet 4    lisinopriL (PRINIVIL,ZESTRIL) 2.5 MG tablet TAKE 1 TABLET (2.5 MG TOTAL) BY MOUTH ONCE DAILY. 90 tablet 3    NIFEdipine (PROCARDIA-XL) 60 MG (OSM) 24 hr tablet Take 1 tablet (60 mg total) by mouth once daily. 90 tablet 4    omega-3 acid ethyl esters (LOVAZA) 1 gram capsule Take 2 capsules (2 g total) by mouth 2 (two) times daily. (Patient taking differently: Take 1 g by mouth 2 (two) times daily.) 360 capsule 5    oxymetazoline HCl (AFRIN, OXYMETAZOLINE, NASL) 1 spray by Each Nostril route once as needed (congestion).      rosuvastatin (CRESTOR) 40 MG Tab TAKE 1 TABLET EVERY EVENING. 90 tablet 3    warfarin (COUMADIN) 5 MG tablet Take 2 tablets (10mg) by mouth , then 1.5 tablets (7.5mg) by mouth all other days as instructed by Coumadin Clinic. (Patient taking differently: Take 1 tablet (5 mg) by mouth Monday, Wednesday & Friday then 1.5 tablets (7.5mg) by mouth all other days (Tues, Thurs, Sat & Sun) as instructed by Coumadin Clinic.) 143 tablet 0    loperamide (IMODIUM A-D) 2 mg Tab Take 2 capsules at onset then 1 capsule as needed for diarrhea.      nitroGLYCERIN (NITROSTAT) 0.4 MG SL tablet Place 1 tablet (0.4 mg total) under the tongue every 5 (five) minutes as needed. As needed for chest pain (Patient not taking: Reported on 2022) 90 tablet 4     No current facility-administered medications on file prior to visit.     Social History:     Social History     Tobacco Use    Smoking status: Former Smoker     Packs/day: 1.00     Years: 20.00     Pack years: 20.00     Quit date: 1980     Years since quittin.7    Smokeless tobacco: Never Used   Substance Use Topics    Alcohol use: No     Family History:     Family History   Problem Relation Age of Onset    Heart failure Mother     Heart disease Mother     Heart failure Father     Heart disease Father     Alcohol abuse Father     Heart failure Brother     Heart disease Brother      "Diabetes Brother     No Known Problems Sister     No Known Problems Maternal Grandmother     No Known Problems Maternal Grandfather     No Known Problems Paternal Grandmother     No Known Problems Paternal Grandfather     Heart disease Sister     No Known Problems Maternal Aunt     No Known Problems Maternal Uncle     No Known Problems Paternal Aunt     No Known Problems Paternal Uncle     Amblyopia Neg Hx     Blindness Neg Hx     Cancer Neg Hx     Cataracts Neg Hx     Glaucoma Neg Hx     Hypertension Neg Hx     Macular degeneration Neg Hx     Retinal detachment Neg Hx     Strabismus Neg Hx     Stroke Neg Hx     Thyroid disease Neg Hx     Anemia Neg Hx     Arrhythmia Neg Hx     Asthma Neg Hx     Clotting disorder Neg Hx     Fainting Neg Hx     Heart attack Neg Hx     Hyperlipidemia Neg Hx     Atrial Septal Defect Neg Hx     Melanoma Neg Hx      Physical Exam:   BP (!) 100/52 (BP Location: Left arm, Patient Position: Sitting, BP Method: Large (Automatic))   Pulse (!) 57   Ht 5' 6" (1.676 m)   Wt 82.2 kg (181 lb 3.5 oz)   SpO2 97%   BMI 29.25 kg/m²      Physical Exam  General: alert, awake and oriented x 3  Eyes:PERRL.   Neck:no JVD   Lungs:  clear to auscultation bilaterally   Cardiovascular: Heart: regular rate and rhythm, mechanical S2, no murmur  Chest Wall: no tenderness.   Pulses-2+ radial,1+  femoral, doppler biphasic DP, PT b/l  Extremities: no cyanosis or edema.   Abdomen/Rectal: Abdomen: soft, non-tender non-distented; bowel sounds normal  Neurologic: Normal strength and tone. No focal numbness or weakness  Labs:     Lab Results   Component Value Date     06/01/2022    K 5.1 06/01/2022     06/01/2022    CO2 19 (L) 06/01/2022    BUN 68 (H) 06/01/2022    CREATININE 3.2 (H) 06/01/2022    ANIONGAP 10 06/01/2022     Lab Results   Component Value Date    HGBA1C 5.7 (H) 02/10/2022     Lab Results   Component Value Date     (H) 01/06/2020     (H) " 03/20/2018    Lab Results   Component Value Date    WBC 5.03 03/15/2022    HGB 9.3 (L) 03/15/2022    HCT 29.7 (L) 03/15/2022     03/15/2022    GRAN 3.0 03/15/2022    GRAN 58.7 03/15/2022     Lab Results   Component Value Date    CHOL 103 (L) 02/10/2022    HDL 34 (L) 02/10/2022    LDLCALC 43.4 (L) 02/10/2022    TRIG 128 02/10/2022            No results found for: EF  Medical decision making      Labs reviewed personally, reported, normal creatinine, normal platelet    Images   EKG: I  personally reviewed and interpreted the EKG, which shows, 02/03/22 EKG with LBBB      Echo, independently visualized the images and personally interpreted, showed, 03/06/2020 EF 45%    Coronary angiography findings/intervention (images independently visualized the images and personally interpreted): 11/30/21-PCI of LM-LCx    Old records from the chart reviewed, h/o mechanical aortic valve replacement and CABG  Imaging:     Nuc Stress EF   Date Value Ref Range Status   11/03/2021 46 % Final     Nuc Rest EF   Date Value Ref Range Status   11/03/2021 44  Final         Assessment:     1. Claudication in peripheral vascular disease    2. Coronary artery disease of bypass graft of native heart with stable angina pectoris    3. H/O mechanical aortic valve replacement    4. Stage 3b chronic kidney disease    5. Hyperlipidemia associated with type 2 diabetes mellitus    6. Hypertension associated with diabetes        Plan:   Claudication in peripheral vascular disease  -Patient having Dianna 2b, Monroe III symptoms -reports pain in b/l LE  -Significant b/l common iliac stenosis  -continue plavix  -continue high intensity statin and beta blocker  -Refer to [facility] for possible intervention using CO2 angiography due to CKD IIIb/IV    Coronary artery disease of bypass graft of native heart with stable angina pectoris  -s/p PCI of LM-LCx  -continue plavix, statin and beta blocker     H/O mechanical aortic valve  replacement  -current on coumadin-being managed by coumadin clinic  -No signs or symptoms of CHF    Stage 3b chronic kidney disease  -creatinine 3.2 today  -recommend follow up with nephrology    Hyperlipidemia associated with type 2 diabetes mellitus  -continue crestor 40 mg daily    Hypertension associated with diabetes  -Bp well controlled  -controlled carvedilol 12.5 mg BID, lisinopril 2.5 mg daily, nifedipine 60 mg daily             ONEYDA STONE  INTERVENTIONAL CARDIOLOGY FELLOW, PGY 7  [phone]

## 2022-06-03 NOTE — PROGRESS NOTES
Patient 's wife notified that clearance paperwork has been faxed. Does not have dates yet as the dentist office is waiting for clearance paperwork, wife will call the CC on Monday with dates of procedure.

## 2022-06-03 NOTE — PROGRESS NOTES
6/3/22-clearance fax to sent to Dr. Jean [phone]:  Pt is to have 2 teeth extracted and then full upper mouth extraction (~16 teeth).  Pt currently on warfarin for hx mechanical aortic valve.  Pt is cleared to continue warfarin for extraction of 2 teeth.  However, recommend to hold warfarin 5 days prior to second procedure and bridge with lovenox.  Please notify Coumadin once scheduled so we can provide instructions.  Let us know if you have any questions or concerns otherwise we will proceed as planned.     Coumadin Clinic   [phone] or [phone]

## 2022-06-14 ENCOUNTER — LAB VISIT (OUTPATIENT)
Dept: LAB | Facility: HOSPITAL | Age: 82
End: 2022-06-14
Attending: INTERNAL MEDICINE
Payer: MEDICARE

## 2022-06-14 ENCOUNTER — ANTI-COAG VISIT (OUTPATIENT)
Dept: CARDIOLOGY | Facility: CLINIC | Age: 82
End: 2022-06-14
Payer: MEDICARE

## 2022-06-14 DIAGNOSIS — Z95.2 H/O MECHANICAL AORTIC VALVE REPLACEMENT: ICD-10-CM

## 2022-06-14 DIAGNOSIS — Z79.01 LONG TERM CURRENT USE OF ANTICOAGULANT THERAPY: ICD-10-CM

## 2022-06-14 DIAGNOSIS — Z79.01 LONG TERM CURRENT USE OF ANTICOAGULANT THERAPY: Primary | ICD-10-CM

## 2022-06-14 LAB
INR PPP: 2.4 (ref 0.8–1.2)
PROTHROMBIN TIME: 23.5 SEC (ref 9–12.5)

## 2022-06-14 PROCEDURE — 93793 ANTICOAG MGMT PT WARFARIN: CPT | Mod: S$GLB,,,

## 2022-06-14 PROCEDURE — 93793 PR ANTICOAGULANT MGMT FOR PT TAKING WARFARIN: ICD-10-PCS | Mod: S$GLB,,,

## 2022-06-14 PROCEDURE — 85610 PROTHROMBIN TIME: CPT | Performed by: INTERNAL MEDICINE

## 2022-06-14 PROCEDURE — 36415 COLL VENOUS BLD VENIPUNCTURE: CPT | Performed by: INTERNAL MEDICINE

## 2022-06-14 NOTE — PROGRESS NOTES
INR at goal. Medications and chart reviewed. No changes noted to necessitate adjustment of warfarin or follow-up plan. See calendar.  Pt is scheduled to have first extraction of (2 teeth) on 6/15.

## 2022-06-20 ENCOUNTER — OFFICE VISIT (OUTPATIENT)
Dept: OPTOMETRY | Facility: CLINIC | Age: 82
End: 2022-06-20
Payer: COMMERCIAL

## 2022-06-20 DIAGNOSIS — E11.22 TYPE 2 DIABETES MELLITUS WITH STAGE 3 CHRONIC KIDNEY DISEASE, WITHOUT LONG-TERM CURRENT USE OF INSULIN, UNSPECIFIED WHETHER STAGE 3A OR 3B CKD: ICD-10-CM

## 2022-06-20 DIAGNOSIS — E11.9 TYPE 2 DIABETES MELLITUS WITHOUT RETINOPATHY: ICD-10-CM

## 2022-06-20 DIAGNOSIS — N18.30 TYPE 2 DIABETES MELLITUS WITH STAGE 3 CHRONIC KIDNEY DISEASE, WITHOUT LONG-TERM CURRENT USE OF INSULIN, UNSPECIFIED WHETHER STAGE 3A OR 3B CKD: ICD-10-CM

## 2022-06-20 DIAGNOSIS — H52.13 MYOPIA WITH ASTIGMATISM AND PRESBYOPIA, BILATERAL: Primary | ICD-10-CM

## 2022-06-20 DIAGNOSIS — H47.393 OPTIC NERVE CUPPING OF BOTH EYES: ICD-10-CM

## 2022-06-20 DIAGNOSIS — H52.4 MYOPIA WITH ASTIGMATISM AND PRESBYOPIA, BILATERAL: Primary | ICD-10-CM

## 2022-06-20 DIAGNOSIS — H52.203 MYOPIA WITH ASTIGMATISM AND PRESBYOPIA, BILATERAL: Primary | ICD-10-CM

## 2022-06-20 DIAGNOSIS — H35.372 EPIRETINAL MEMBRANE (ERM) OF LEFT EYE: ICD-10-CM

## 2022-06-20 DIAGNOSIS — H25.813 COMBINED FORM OF AGE-RELATED CATARACT, BOTH EYES: ICD-10-CM

## 2022-06-20 DIAGNOSIS — H40.012 OAG (OPEN ANGLE GLAUCOMA) SUSPECT, LOW RISK, LEFT: ICD-10-CM

## 2022-06-20 PROCEDURE — 99999 PR PBB SHADOW E&M-EST. PATIENT-LVL III: CPT | Mod: PBBFAC,,, | Performed by: OPTOMETRIST

## 2022-06-20 PROCEDURE — 92014 PR EYE EXAM, EST PATIENT,COMPREHESV: ICD-10-PCS | Mod: S$GLB,,, | Performed by: OPTOMETRIST

## 2022-06-20 PROCEDURE — 92015 PR REFRACTION: ICD-10-PCS | Mod: S$GLB,,, | Performed by: OPTOMETRIST

## 2022-06-20 PROCEDURE — 92133 CPTRZD OPH DX IMG PST SGM ON: CPT | Mod: S$GLB,,, | Performed by: OPTOMETRIST

## 2022-06-20 PROCEDURE — 92133 POSTERIOR SEGMENT OCT OPTIC NERVE(OCULAR COHERENCE TOMOGRAPHY) - OU - BOTH EYES: ICD-10-PCS | Mod: S$GLB,,, | Performed by: OPTOMETRIST

## 2022-06-20 PROCEDURE — 92014 COMPRE OPH EXAM EST PT 1/>: CPT | Mod: S$GLB,,, | Performed by: OPTOMETRIST

## 2022-06-20 PROCEDURE — 99999 PR PBB SHADOW E&M-EST. PATIENT-LVL III: ICD-10-PCS | Mod: PBBFAC,,, | Performed by: OPTOMETRIST

## 2022-06-20 PROCEDURE — 92015 DETERMINE REFRACTIVE STATE: CPT | Mod: S$GLB,,, | Performed by: OPTOMETRIST

## 2022-06-20 NOTE — PROGRESS NOTES
HPI     80 Y/o male is here for routine eye exam with C/o pt states when he wakes   up in the morning vision is blurry for a few min.  Pt denies pain and discomfort   Pt see's flashes occasionally     Eye med: no gtt    Last edited by Ford Webb MA on 6/20/2022  8:43 AM. (History)            Assessment /Plan     For exam results, see Encounter Report.    Myopia with astigmatism and presbyopia, bilateral     OAG (open angle glaucoma) suspect, low risk, left  Optic nerve cupping of both eyes  -     Posterior Segment OCT Optic Nerve- WNL OU    Epiretinal membrane (ERM) of left eye    Type 2 diabetes mellitus with stage 3 chronic kidney disease, without long-term current use of insulin, unspecified whether stage 3a or 3b CKD  Type 2 diabetes mellitus without retinopathy    Cataract OU   Consult for cat eval

## 2022-06-28 ENCOUNTER — ANTI-COAG VISIT (OUTPATIENT)
Dept: CARDIOLOGY | Facility: CLINIC | Age: 82
End: 2022-06-28
Payer: MEDICARE

## 2022-06-28 ENCOUNTER — LAB VISIT (OUTPATIENT)
Dept: LAB | Facility: HOSPITAL | Age: 82
End: 2022-06-28
Attending: INTERNAL MEDICINE
Payer: MEDICARE

## 2022-06-28 DIAGNOSIS — Z79.01 LONG TERM CURRENT USE OF ANTICOAGULANT THERAPY: Primary | ICD-10-CM

## 2022-06-28 DIAGNOSIS — Z95.2 H/O MECHANICAL AORTIC VALVE REPLACEMENT: ICD-10-CM

## 2022-06-28 DIAGNOSIS — Z79.01 LONG TERM CURRENT USE OF ANTICOAGULANT THERAPY: ICD-10-CM

## 2022-06-28 LAB
INR PPP: 2.6 (ref 0.8–1.2)
PROTHROMBIN TIME: 25.6 SEC (ref 9–12.5)

## 2022-06-28 PROCEDURE — 36415 COLL VENOUS BLD VENIPUNCTURE: CPT | Performed by: INTERNAL MEDICINE

## 2022-06-28 PROCEDURE — 93793 ANTICOAG MGMT PT WARFARIN: CPT | Mod: S$GLB,,,

## 2022-06-28 PROCEDURE — 85610 PROTHROMBIN TIME: CPT | Performed by: INTERNAL MEDICINE

## 2022-06-28 PROCEDURE — 93793 PR ANTICOAGULANT MGMT FOR PT TAKING WARFARIN: ICD-10-PCS | Mod: S$GLB,,,

## 2022-07-14 DIAGNOSIS — M10.9 GOUT, UNSPECIFIED CAUSE, UNSPECIFIED CHRONICITY, UNSPECIFIED SITE: ICD-10-CM

## 2022-07-14 NOTE — TELEPHONE ENCOUNTER
Care Due:                  Date            Visit Type   Department     Provider  --------------------------------------------------------------------------------                                EP -                              PRIMARY      [facility] INTERNAL  Last Visit: 05-      CARE ([facility])   JOSHUA Langston                              [facility]                              PRIMARY      [facility] INTERNAL  Next Visit: 10-      CARE ([facility])   [facility]       Devon Langston                                                            Last  Test          Frequency    Reason                     Performed    Due Date  --------------------------------------------------------------------------------    HBA1C.......  6 months...  glimepiride..............  02-   08-    Uric Acid...  12 months..  allopurinoL..............  Not Found    Overdue    [employer] Embedded Care Gaps. Reference number: [id number]. 7/14/2022   9:17:24 AM CDT

## 2022-07-14 NOTE — TELEPHONE ENCOUNTER
----- Message from Reny Shook sent at 7/14/2022  9:11 AM CDT -----  Contact: [phone]  Requesting an RX refill or new RX.  Is this a refill or new RX: refill 1  RX name and strength (copy/paste from chart):  allopurinoL (ZYLOPRIM) 100 MG tablet  Is this a 30 day or 90 day RX:   Pharmacy name and phone # (copy/paste from chart):  [facility] ([facility]) - [address]   Phone:  [phone]  Fax:  [phone]        The doctors have asked that we provide their patients with the following 2 reminders -- prescription refills can take up to 72 hours, and a friendly reminder that in the future you can use your [facility] account to request refills:

## 2022-07-15 RX ORDER — ALLOPURINOL 100 MG/1
TABLET ORAL
Qty: 90 TABLET | Refills: 3 | Status: SHIPPED | OUTPATIENT
Start: 2022-07-15 | End: 2023-05-29

## 2022-07-19 ENCOUNTER — LAB VISIT (OUTPATIENT)
Dept: LAB | Facility: HOSPITAL | Age: 82
End: 2022-07-19
Attending: INTERNAL MEDICINE
Payer: MEDICARE

## 2022-07-19 ENCOUNTER — ANTI-COAG VISIT (OUTPATIENT)
Dept: CARDIOLOGY | Facility: CLINIC | Age: 82
End: 2022-07-19
Payer: MEDICARE

## 2022-07-19 DIAGNOSIS — Z95.2 H/O MECHANICAL AORTIC VALVE REPLACEMENT: ICD-10-CM

## 2022-07-19 DIAGNOSIS — Z79.01 LONG TERM CURRENT USE OF ANTICOAGULANT THERAPY: Primary | ICD-10-CM

## 2022-07-19 DIAGNOSIS — Z79.01 LONG TERM CURRENT USE OF ANTICOAGULANT THERAPY: ICD-10-CM

## 2022-07-19 LAB
INR PPP: 2.2 (ref 0.8–1.2)
PROTHROMBIN TIME: 21.9 SEC (ref 9–12.5)

## 2022-07-19 PROCEDURE — 36415 COLL VENOUS BLD VENIPUNCTURE: CPT | Performed by: INTERNAL MEDICINE

## 2022-07-19 PROCEDURE — 85610 PROTHROMBIN TIME: CPT | Performed by: INTERNAL MEDICINE

## 2022-07-19 PROCEDURE — 93793 ANTICOAG MGMT PT WARFARIN: CPT | Mod: S$GLB,,,

## 2022-07-19 PROCEDURE — 93793 PR ANTICOAGULANT MGMT FOR PT TAKING WARFARIN: ICD-10-PCS | Mod: S$GLB,,,

## 2022-08-03 NOTE — NURSING
Compass Report submitted. Details of interactions below pulled from Compass Report.     -4/29/2022: First interaction with patient, gave them COVID vaccine. Patient was overally friendly.    -Two weeks later: Patient dropped off sweatshirt as a gift.    -8/2/2022: Patient has upcoming procedure on 8/5/2022 and showed up for COVID test without an appointment. Made patient an appointment and again patient was overally friendly and asking questions about personal life.     -8/3/2022: Patient showed up in COVID testing without an appointment. Patient stated doctor needed a picture of patient completing COVID test. I told the patient this was not necessary and they insisted they needed to do another test. Once patient was finished they lingered around and eventually asked to go on a date. I denied this request. Patient continued asking questions about personal life and work schedule.        Patient reports numbness and tingling is improved on the left hand

## 2022-08-09 ENCOUNTER — LAB VISIT (OUTPATIENT)
Dept: LAB | Facility: HOSPITAL | Age: 82
End: 2022-08-09
Attending: INTERNAL MEDICINE
Payer: MEDICARE

## 2022-08-09 ENCOUNTER — ANTI-COAG VISIT (OUTPATIENT)
Dept: CARDIOLOGY | Facility: CLINIC | Age: 82
End: 2022-08-09
Payer: MEDICARE

## 2022-08-09 DIAGNOSIS — Z79.01 LONG TERM CURRENT USE OF ANTICOAGULANT THERAPY: Primary | ICD-10-CM

## 2022-08-09 DIAGNOSIS — Z79.01 LONG TERM CURRENT USE OF ANTICOAGULANT THERAPY: ICD-10-CM

## 2022-08-09 DIAGNOSIS — Z95.2 H/O MECHANICAL AORTIC VALVE REPLACEMENT: ICD-10-CM

## 2022-08-09 LAB
INR PPP: 2.8 (ref 0.8–1.2)
PROTHROMBIN TIME: 28 SEC (ref 9–12.5)

## 2022-08-09 PROCEDURE — 36415 COLL VENOUS BLD VENIPUNCTURE: CPT | Performed by: INTERNAL MEDICINE

## 2022-08-09 PROCEDURE — 93793 ANTICOAG MGMT PT WARFARIN: CPT | Mod: S$GLB,,,

## 2022-08-09 PROCEDURE — 93793 PR ANTICOAGULANT MGMT FOR PT TAKING WARFARIN: ICD-10-PCS | Mod: S$GLB,,,

## 2022-08-09 PROCEDURE — 85610 PROTHROMBIN TIME: CPT | Performed by: INTERNAL MEDICINE

## 2022-08-17 DIAGNOSIS — I15.0 RENOVASCULAR HYPERTENSION: ICD-10-CM

## 2022-08-17 RX ORDER — CARVEDILOL 12.5 MG/1
12.5 TABLET ORAL 2 TIMES DAILY
Qty: 180 TABLET | Refills: 3 | Status: ON HOLD | OUTPATIENT
Start: 2022-08-17 | End: 2022-10-01 | Stop reason: SDUPTHER

## 2022-08-17 NOTE — TELEPHONE ENCOUNTER
No new care gaps identified.  [facility] Embedded Care Gaps. Reference number: [id number]. 8/17/2022   11:36:49 AM MAKT

## 2022-08-17 NOTE — TELEPHONE ENCOUNTER
----- Message from Chandrika Frye sent at 8/17/2022 11:31 AM CDT -----  Contact: Ameena (Wife) [phone]  Requesting an RX refill or new RX.    Is this a refill or new RX: refill    RX name and strength (copy/paste from chart): carvediloL (COREG) 12.5 MG tablet     Is this a 30 day or 90 day RX: 90    Pharmacy name and phone # (copy/paste from chart):      [facility] ([facility]) - [facility] [address]  [address] [address]  Phone: [phone] Fax: [phone]    Wife is requesting a response via [employer].

## 2022-08-19 ENCOUNTER — TELEPHONE (OUTPATIENT)
Dept: INTERNAL MEDICINE | Facility: CLINIC | Age: 82
End: 2022-08-19
Payer: MEDICARE

## 2022-08-19 NOTE — TELEPHONE ENCOUNTER
----- Message from Tiny Smith sent at 8/19/2022 10:19 AM CDT -----  Contact: Mobile    [phone]            Pts wife Mrs. Urbina Mobile#  Patients wife Mrs. Urbina is calling in regards to her saying that she put in an order about two weeks for her husbands carvediloL (COREG) 12.5 MG tablet and she wanted for you to send the script to [facility] but she said that the script was never sent in for the patient.     In the system it shows that three refills of the patients carvedilol was sent to [facility] (Now [facility]) - [place], [place] - [address]  [address] [facility] [address] on 08/17/2022 and Mrs. Urbina would like to speak with you about this please.     Comment: Patient is almost out of medication.

## 2022-08-23 NOTE — ASSESSMENT & PLAN NOTE
- Patient is asymptomatic and stable  - Stopped home meds- patient was being bridged from lovenox to coumadin, but due to bleeding the patient was placed on heparin drip for short half life       Detail Level: Detailed

## 2022-08-31 DIAGNOSIS — I25.10 CORONARY ARTERY DISEASE: ICD-10-CM

## 2022-08-31 NOTE — TELEPHONE ENCOUNTER
No new care gaps identified.  [facility] Embedded Care Gaps. Reference number: [id number]. 8/31/2022   2:48:33 PM CDT

## 2022-08-31 NOTE — TELEPHONE ENCOUNTER
----- Message from Davionbertha Bryan sent at 8/31/2022  2:26 PM CDT -----  Contact: self [phone]  Requesting an RX refill or new RX.  Is this a refill or new RX: refill  RX name and strength (copy/paste from chart):  isosorbide mononitrate (IMDUR) 120 MG 24 hr tablet  Is this a 30 day or 90 day RX:   Pharmacy name and phone # (copy/paste from chart):    [facility] ([facility]) - [place] [id number] [facility]  [address] [address]  Phone: [phone] Fax: [phone]    The doctors have asked that we provide their patients with the following 2 reminders -- prescription refills can take up to 72 hours, and a friendly reminder that in the future you can use your [facility] account to request refills: yes    Please call and advise

## 2022-09-02 RX ORDER — ISOSORBIDE MONONITRATE 120 MG/1
120 TABLET, EXTENDED RELEASE ORAL DAILY
Qty: 90 TABLET | Refills: 4 | Status: ON HOLD | OUTPATIENT
Start: 2022-09-02 | End: 2022-12-28 | Stop reason: HOSPADM

## 2022-09-06 ENCOUNTER — OFFICE VISIT (OUTPATIENT)
Dept: OPHTHALMOLOGY | Facility: CLINIC | Age: 82
End: 2022-09-06
Payer: MEDICARE

## 2022-09-06 ENCOUNTER — LAB VISIT (OUTPATIENT)
Dept: LAB | Facility: HOSPITAL | Age: 82
End: 2022-09-06
Attending: INTERNAL MEDICINE
Payer: MEDICARE

## 2022-09-06 ENCOUNTER — ANTI-COAG VISIT (OUTPATIENT)
Dept: CARDIOLOGY | Facility: CLINIC | Age: 82
End: 2022-09-06
Payer: MEDICARE

## 2022-09-06 DIAGNOSIS — Z79.01 LONG TERM CURRENT USE OF ANTICOAGULANT THERAPY: Primary | ICD-10-CM

## 2022-09-06 DIAGNOSIS — Z95.2 H/O MECHANICAL AORTIC VALVE REPLACEMENT: ICD-10-CM

## 2022-09-06 DIAGNOSIS — H25.12 NUCLEAR SCLEROTIC CATARACT OF LEFT EYE: Primary | ICD-10-CM

## 2022-09-06 DIAGNOSIS — Z79.01 LONG TERM CURRENT USE OF ANTICOAGULANT THERAPY: ICD-10-CM

## 2022-09-06 DIAGNOSIS — H25.11 NUCLEAR SCLEROTIC CATARACT OF RIGHT EYE: ICD-10-CM

## 2022-09-06 DIAGNOSIS — H35.372 EPIRETINAL MEMBRANE, LEFT: ICD-10-CM

## 2022-09-06 LAB
INR PPP: 2.6 (ref 0.8–1.2)
PROTHROMBIN TIME: 25.6 SEC (ref 9–12.5)

## 2022-09-06 PROCEDURE — 3288F FALL RISK ASSESSMENT DOCD: CPT | Mod: CPTII,S$GLB,, | Performed by: OPHTHALMOLOGY

## 2022-09-06 PROCEDURE — 1126F PR PAIN SEVERITY QUANTIFIED, NO PAIN PRESENT: ICD-10-PCS | Mod: CPTII,S$GLB,, | Performed by: OPHTHALMOLOGY

## 2022-09-06 PROCEDURE — 99204 PR OFFICE/OUTPT VISIT, NEW, LEVL IV, 45-59 MIN: ICD-10-PCS | Mod: S$GLB,,, | Performed by: OPHTHALMOLOGY

## 2022-09-06 PROCEDURE — 2023F PR DILATED RETINAL EXAM W/O EVID OF RETINOPATHY: ICD-10-PCS | Mod: CPTII,S$GLB,, | Performed by: OPHTHALMOLOGY

## 2022-09-06 PROCEDURE — 99204 OFFICE O/P NEW MOD 45 MIN: CPT | Mod: S$GLB,,, | Performed by: OPHTHALMOLOGY

## 2022-09-06 PROCEDURE — 92134 POSTERIOR SEGMENT OCT RETINA (OCULAR COHERENCE TOMOGRAPHY)-BOTH EYES: ICD-10-PCS | Mod: S$GLB,,, | Performed by: OPHTHALMOLOGY

## 2022-09-06 PROCEDURE — 1126F AMNT PAIN NOTED NONE PRSNT: CPT | Mod: CPTII,S$GLB,, | Performed by: OPHTHALMOLOGY

## 2022-09-06 PROCEDURE — 1101F PT FALLS ASSESS-DOCD LE1/YR: CPT | Mod: CPTII,S$GLB,, | Performed by: OPHTHALMOLOGY

## 2022-09-06 PROCEDURE — 2023F DILAT RTA XM W/O RTNOPTHY: CPT | Mod: CPTII,S$GLB,, | Performed by: OPHTHALMOLOGY

## 2022-09-06 PROCEDURE — 85610 PROTHROMBIN TIME: CPT | Performed by: INTERNAL MEDICINE

## 2022-09-06 PROCEDURE — 92134 CPTRZ OPH DX IMG PST SGM RTA: CPT | Mod: S$GLB,,, | Performed by: OPHTHALMOLOGY

## 2022-09-06 PROCEDURE — 3288F PR FALLS RISK ASSESSMENT DOCUMENTED: ICD-10-PCS | Mod: CPTII,S$GLB,, | Performed by: OPHTHALMOLOGY

## 2022-09-06 PROCEDURE — 36415 COLL VENOUS BLD VENIPUNCTURE: CPT | Performed by: INTERNAL MEDICINE

## 2022-09-06 PROCEDURE — 99999 PR PBB SHADOW E&M-EST. PATIENT-LVL III: CPT | Mod: PBBFAC,,, | Performed by: OPHTHALMOLOGY

## 2022-09-06 PROCEDURE — 92136 OPHTHALMIC BIOMETRY: CPT | Mod: LT,S$GLB,, | Performed by: OPHTHALMOLOGY

## 2022-09-06 PROCEDURE — 93793 PR ANTICOAGULANT MGMT FOR PT TAKING WARFARIN: ICD-10-PCS | Mod: S$GLB,,,

## 2022-09-06 PROCEDURE — 99999 PR PBB SHADOW E&M-EST. PATIENT-LVL III: ICD-10-PCS | Mod: PBBFAC,,, | Performed by: OPHTHALMOLOGY

## 2022-09-06 PROCEDURE — 93793 ANTICOAG MGMT PT WARFARIN: CPT | Mod: S$GLB,,,

## 2022-09-06 PROCEDURE — 92136 IOL MASTER - OU - BOTH EYES: ICD-10-PCS | Mod: LT,S$GLB,, | Performed by: OPHTHALMOLOGY

## 2022-09-06 PROCEDURE — 1101F PR PT FALLS ASSESS DOC 0-1 FALLS W/OUT INJ PAST YR: ICD-10-PCS | Mod: CPTII,S$GLB,, | Performed by: OPHTHALMOLOGY

## 2022-09-06 NOTE — PROGRESS NOTES
Wife called and was given lab result, verified correct coumadin dose, reports no changes, wife was given coumadin instructions and next lab date, verbalized understanding

## 2022-09-06 NOTE — PROGRESS NOTES
"HPI    Dr. Narvaez    Myopia with Astigmatism and PresbyopiaOU  OAG Suspect OS  Optic Nerve Cupping OU  ERM OS  Type2 DM no DR  NS OU    MEDS:  AT's PRN OU    Pt here for cataract evaluation per   Pt feels that vision is very blurry and states  that "he cant see"   Last edited by Akua Mckeon MA on 9/6/2022  1:12 PM.            Assessment /Plan     For exam results, see Encounter Report.    There are no diagnoses linked to this encounter.             Visually significant nuclear sclerotic cataract   - Interfering with activities of daily living.  Pt desires cataract surgery for [facility] rehabilitation.   - R/B/A discussed and pt agrees to proceed with surgery.   - IOL options discussed according to patient's goals and concomitant ocular pathology; and pt content with monofocal lens.    - Target: plano.    Diboo 20.5 OS     (Diboo 21.5 OD)    ERM OS    Astig OD>OS    Okay with rx/ readers post sx, defers upgraded lens technology        "

## 2022-09-22 ENCOUNTER — TELEPHONE (OUTPATIENT)
Dept: CARDIOLOGY | Facility: CLINIC | Age: 82
End: 2022-09-22
Payer: MEDICARE

## 2022-09-22 DIAGNOSIS — E78.5 HYPERLIPIDEMIA ASSOCIATED WITH TYPE 2 DIABETES MELLITUS: Primary | ICD-10-CM

## 2022-09-22 DIAGNOSIS — I70.219 ATHEROSCLEROSIS OF LOWER EXTREMITY WITH CLAUDICATION: ICD-10-CM

## 2022-09-22 DIAGNOSIS — M79.605 PAIN IN BOTH LOWER EXTREMITIES: ICD-10-CM

## 2022-09-22 DIAGNOSIS — M79.604 PAIN IN BOTH LOWER EXTREMITIES: ICD-10-CM

## 2022-09-22 DIAGNOSIS — E11.69 HYPERLIPIDEMIA ASSOCIATED WITH TYPE 2 DIABETES MELLITUS: Primary | ICD-10-CM

## 2022-09-22 DIAGNOSIS — I25.708 CORONARY ARTERY DISEASE OF BYPASS GRAFT OF NATIVE HEART WITH STABLE ANGINA PECTORIS: ICD-10-CM

## 2022-09-22 DIAGNOSIS — Z95.2 H/O MECHANICAL AORTIC VALVE REPLACEMENT: ICD-10-CM

## 2022-09-22 DIAGNOSIS — I27.9 PULMONARY HEART DISEASE: ICD-10-CM

## 2022-09-26 ENCOUNTER — OFFICE VISIT (OUTPATIENT)
Dept: CARDIOLOGY | Facility: CLINIC | Age: 82
End: 2022-09-26
Payer: MEDICARE

## 2022-09-26 VITALS
HEART RATE: 54 BPM | HEIGHT: 66 IN | WEIGHT: 169 LBS | SYSTOLIC BLOOD PRESSURE: 123 MMHG | BODY MASS INDEX: 27.16 KG/M2 | DIASTOLIC BLOOD PRESSURE: 50 MMHG

## 2022-09-26 DIAGNOSIS — E66.01 MORBID OBESITY: Chronic | ICD-10-CM

## 2022-09-26 DIAGNOSIS — E78.5 HYPERLIPIDEMIA ASSOCIATED WITH TYPE 2 DIABETES MELLITUS: ICD-10-CM

## 2022-09-26 DIAGNOSIS — R06.09 DOE (DYSPNEA ON EXERTION): ICD-10-CM

## 2022-09-26 DIAGNOSIS — I15.0 RENOVASCULAR HYPERTENSION: ICD-10-CM

## 2022-09-26 DIAGNOSIS — I70.219 ATHEROSCLEROSIS OF LOWER EXTREMITY WITH CLAUDICATION: ICD-10-CM

## 2022-09-26 DIAGNOSIS — Z95.2 H/O MECHANICAL AORTIC VALVE REPLACEMENT: ICD-10-CM

## 2022-09-26 DIAGNOSIS — Z79.01 LONG TERM CURRENT USE OF ANTICOAGULANT THERAPY: ICD-10-CM

## 2022-09-26 DIAGNOSIS — I25.708 CORONARY ARTERY DISEASE OF BYPASS GRAFT OF NATIVE HEART WITH STABLE ANGINA PECTORIS: ICD-10-CM

## 2022-09-26 DIAGNOSIS — E11.69 HYPERLIPIDEMIA ASSOCIATED WITH TYPE 2 DIABETES MELLITUS: ICD-10-CM

## 2022-09-26 DIAGNOSIS — I15.2 HYPERTENSION ASSOCIATED WITH DIABETES: ICD-10-CM

## 2022-09-26 DIAGNOSIS — N18.32 STAGE 3B CHRONIC KIDNEY DISEASE: Chronic | ICD-10-CM

## 2022-09-26 DIAGNOSIS — I27.9 PULMONARY HEART DISEASE: ICD-10-CM

## 2022-09-26 DIAGNOSIS — I70.213 ATHEROSCLEROSIS OF NATIVE ARTERY OF BOTH LOWER EXTREMITIES WITH INTERMITTENT CLAUDICATION: Primary | ICD-10-CM

## 2022-09-26 DIAGNOSIS — I65.23 BILATERAL CAROTID ARTERY STENOSIS: ICD-10-CM

## 2022-09-26 DIAGNOSIS — E11.59 HYPERTENSION ASSOCIATED WITH DIABETES: ICD-10-CM

## 2022-09-26 PROCEDURE — 1101F PR PT FALLS ASSESS DOC 0-1 FALLS W/OUT INJ PAST YR: ICD-10-PCS | Mod: CPTII,S$GLB,, | Performed by: INTERNAL MEDICINE

## 2022-09-26 PROCEDURE — 99214 PR OFFICE/OUTPT VISIT, EST, LEVL IV, 30-39 MIN: ICD-10-PCS | Mod: S$GLB,,, | Performed by: INTERNAL MEDICINE

## 2022-09-26 PROCEDURE — 3078F DIAST BP <80 MM HG: CPT | Mod: CPTII,S$GLB,, | Performed by: INTERNAL MEDICINE

## 2022-09-26 PROCEDURE — 99999 PR PBB SHADOW E&M-EST. PATIENT-LVL IV: ICD-10-PCS | Mod: PBBFAC,,, | Performed by: INTERNAL MEDICINE

## 2022-09-26 PROCEDURE — 99999 PR PBB SHADOW E&M-EST. PATIENT-LVL IV: CPT | Mod: PBBFAC,,, | Performed by: INTERNAL MEDICINE

## 2022-09-26 PROCEDURE — 1126F AMNT PAIN NOTED NONE PRSNT: CPT | Mod: CPTII,S$GLB,, | Performed by: INTERNAL MEDICINE

## 2022-09-26 PROCEDURE — 1126F PR PAIN SEVERITY QUANTIFIED, NO PAIN PRESENT: ICD-10-PCS | Mod: CPTII,S$GLB,, | Performed by: INTERNAL MEDICINE

## 2022-09-26 PROCEDURE — 3074F SYST BP LT 130 MM HG: CPT | Mod: CPTII,S$GLB,, | Performed by: INTERNAL MEDICINE

## 2022-09-26 PROCEDURE — 3072F PR LOW RISK FOR RETINOPATHY: ICD-10-PCS | Mod: CPTII,S$GLB,, | Performed by: INTERNAL MEDICINE

## 2022-09-26 PROCEDURE — 99499 UNLISTED E&M SERVICE: CPT | Mod: HCNC,S$GLB,, | Performed by: INTERNAL MEDICINE

## 2022-09-26 PROCEDURE — 3288F FALL RISK ASSESSMENT DOCD: CPT | Mod: CPTII,S$GLB,, | Performed by: INTERNAL MEDICINE

## 2022-09-26 PROCEDURE — 3288F PR FALLS RISK ASSESSMENT DOCUMENTED: ICD-10-PCS | Mod: CPTII,S$GLB,, | Performed by: INTERNAL MEDICINE

## 2022-09-26 PROCEDURE — 1101F PT FALLS ASSESS-DOCD LE1/YR: CPT | Mod: CPTII,S$GLB,, | Performed by: INTERNAL MEDICINE

## 2022-09-26 PROCEDURE — 1159F MED LIST DOCD IN RCRD: CPT | Mod: CPTII,S$GLB,, | Performed by: INTERNAL MEDICINE

## 2022-09-26 PROCEDURE — 1159F PR MEDICATION LIST DOCUMENTED IN MEDICAL RECORD: ICD-10-PCS | Mod: CPTII,S$GLB,, | Performed by: INTERNAL MEDICINE

## 2022-09-26 PROCEDURE — 3074F PR MOST RECENT SYSTOLIC BLOOD PRESSURE < 130 MM HG: ICD-10-PCS | Mod: CPTII,S$GLB,, | Performed by: INTERNAL MEDICINE

## 2022-09-26 PROCEDURE — 3072F LOW RISK FOR RETINOPATHY: CPT | Mod: CPTII,S$GLB,, | Performed by: INTERNAL MEDICINE

## 2022-09-26 PROCEDURE — 3078F PR MOST RECENT DIASTOLIC BLOOD PRESSURE < 80 MM HG: ICD-10-PCS | Mod: CPTII,S$GLB,, | Performed by: INTERNAL MEDICINE

## 2022-09-26 PROCEDURE — 99214 OFFICE O/P EST MOD 30 MIN: CPT | Mod: S$GLB,,, | Performed by: INTERNAL MEDICINE

## 2022-09-26 RX ORDER — DIPHENHYDRAMINE HCL 50 MG
50 CAPSULE ORAL ONCE
Status: CANCELLED | OUTPATIENT
Start: 2022-09-26 | End: 2022-09-26

## 2022-09-26 RX ORDER — SODIUM CHLORIDE 9 MG/ML
INJECTION, SOLUTION INTRAVENOUS CONTINUOUS
Status: CANCELLED | OUTPATIENT
Start: 2022-09-26 | End: 2022-09-26

## 2022-09-26 NOTE — PROGRESS NOTES
Subjective:   Patient ID:  Dariel Urbina is a 82 y.o. male who presents for  of Claudication, Peripheral Arterial Disease, Hyperlipidemia, Hypertension, Valvular Heart Disease, and Coronary Artery Disease      HPI:       82-year-old male who is here by recommendation of his care team to establish cardiovascular care.  He has a past medical history of coronary disease status post CABG, mechanical valve in aortic position, hypertension, hyperlipidemia, chronic kidney disease stage 4, chronic anticoagulation with Coumadin, peripheral arterial disease with worsening claudication to approaching 3, epistaxis, and congestive heart failure with ejection fraction 45% + grade 2 diastolic dysfunction.      After multivessel PCI in November 2021 the decision was made to proceed with revascularization using CO2 angiography, extravascular lotion, intravascular ultrasound however this procedure was delayed by many other medical problems. He is here today and ready for revascularization.  He has claudication that occurs with ambulation of less than 50 ft.    Labs 6/2022: Cr 3.2 gfr 17    Echo 3/2020     EF 45% + grade II DD   Mechanical valve in aortic position    Functioning well   Mild AI/MR   PASP 50 mmHg    AAA 3.11 (US 9/2020)     POOL 3/2021     R 0.89 to 0.39 after exercise    L 1.06 to 0.77 after exercise     Angiogram 9/2021          Patient Active Problem List    Diagnosis Date Noted    Chronic kidney disease (CKD), stage IV (severe) 02/10/2022    Weakness 02/03/2022    Anemia 02/03/2022    Thrombocytopenia 11/10/2021    Atherosclerosis of native coronary artery of native heart with unstable angina pectoris 09/29/2021    Severe carpal tunnel syndrome of right wrist 05/19/2020    Bilateral carpal tunnel syndrome 04/30/2020    Numbness and tingling in both hands 04/30/2020    LÓPEZ (dyspnea on exertion) 03/02/2020    Upper extremity weakness 09/25/2019    Hypertension associated with diabetes 09/25/2019    Morbid obesity  2019    Hx of colonic polyp 2018    Gastrointestinal hemorrhage associated with intestinal diverticulosis 2018     4-3-18 C-scope  - Hemorrhoids found on perianal exam.   - Blood in the rectum and in the sigmoid colon.  - Patent end-to-end colo-colonic anastomosis,  characterized by healthy appearing mucosa.  - Diverticulosis in the sigmoid colon.   - Diverticulosis in the sigmoid colon. There was  active bleeding coming from the diverticular      opening. Clip (MR conditional) was placed.   - One 4 mm polyp in the descending colon. Resection   not attempted.   - One 10 mm polyp at the splenic flexure. Resection  not attempted. Tattooed.      Epistaxis 2018    Metabolic acidosis with normal anion gap and bicarbonate losses 2018    Pulmonary heart disease 2018     Noted on imaging 5/3/2017.      Bilateral carotid artery disease 2017    Hyperparathyroidism due to renal insufficiency 2015    Stage 3b chronic kidney disease 2015    Type 2 diabetes mellitus with diabetic peripheral angiopathy without gangrene 2015    H/O mechanical aortic valve replacement 2014    Atherosclerosis of native artery of extremity with intermittent claudication 2014    Type 2 diabetes mellitus with stage 3 chronic kidney disease, without long-term current use of insulin 10/02/2013    Long term current use of anticoagulant therapy 2012    Hyperlipidemia associated with type 2 diabetes mellitus 2012    History of colon resection 2012    Renovascular hypertension 2012    History of gout 2012    Atherosclerosis of lower extremity with claudication 2012    Coronary artery disease of bypass graft of native heart with stable angina pectoris 2012     3-27-18 [facility]  Two vessel coronary artery disease.    Prosthetic aortic valve.    Porcelain aorta.    Patent LIMA graft.             Right Arm BP - Sittin/50  Left Arm BP - Sitting:  103/51        LABS    LAST HbA1c  Lab Results   Component Value Date    HGBA1C 5.7 (H) 02/10/2022       Lipid panel  Lab Results   Component Value Date    CHOL 103 (L) 02/10/2022    CHOL 152 02/09/2021    CHOL 150 06/22/2020     Lab Results   Component Value Date    HDL 34 (L) 02/10/2022    HDL 34 (L) 02/09/2021    HDL 38 (L) 06/22/2020     Lab Results   Component Value Date    LDLCALC 43.4 (L) 02/10/2022    LDLCALC 70.4 02/09/2021    LDLCALC 70.0 06/22/2020     Lab Results   Component Value Date    TRIG 128 02/10/2022    TRIG 238 (H) 02/09/2021    TRIG 210 (H) 06/22/2020     Lab Results   Component Value Date    CHOLHDL 33.0 02/10/2022    CHOLHDL 22.4 02/09/2021    CHOLHDL 25.3 06/22/2020            Review of Systems   Constitutional: Negative for diaphoresis, night sweats, weight gain and weight loss.   HENT:  Negative for congestion.    Eyes:  Negative for blurred vision, discharge and double vision.   Cardiovascular:  Positive for claudication. Negative for chest pain, cyanosis, dyspnea on exertion, irregular heartbeat, leg swelling, near-syncope, orthopnea, palpitations, paroxysmal nocturnal dyspnea and syncope.   Respiratory:  Positive for shortness of breath. Negative for cough and wheezing.    Endocrine: Negative for cold intolerance, heat intolerance and polyphagia.   Hematologic/Lymphatic: Negative for adenopathy and bleeding problem. Does not bruise/bleed easily.   Skin:  Negative for dry skin and nail changes.   Musculoskeletal:  Negative for arthritis, back pain, falls, joint pain, myalgias and neck pain.   Gastrointestinal:  Negative for bloating, abdominal pain, change in bowel habit and constipation.   Genitourinary:  Negative for bladder incontinence, dysuria, flank pain, genital sores and missed menses.   Neurological:  Negative for aphonia, brief paralysis, difficulty with concentration, dizziness and weakness.   Psychiatric/Behavioral:  Negative for altered mental status and memory loss. The  patient does not have insomnia.    Allergic/Immunologic: Negative for environmental allergies.     Objective:   Physical Exam  Constitutional:       Appearance: He is well-developed.      Interventions: He is not intubated.  HENT:      Head: Normocephalic and atraumatic.      Right Ear: External ear normal.      Left Ear: External ear normal.   Eyes:      General: No scleral icterus.        Right eye: No discharge.         Left eye: No discharge.      Conjunctiva/sclera: Conjunctivae normal.      Pupils: Pupils are equal, round, and reactive to light.   Neck:      Thyroid: No thyromegaly.      Vascular: Normal carotid pulses. No carotid bruit, hepatojugular reflux or JVD.      Trachea: No tracheal deviation.   Cardiovascular:      Rate and Rhythm: Normal rate and regular rhythm. No extrasystoles are present.     Chest Wall: PMI is not displaced.      Pulses:           Carotid pulses are 2+ on the right side with bruit and 2+ on the left side with bruit.       Radial pulses are 1+ on the right side and 1+ on the left side.        Femoral pulses are 1+ on the right side and 1+ on the left side.       Popliteal pulses are 0 on the right side and 0 on the left side.        Dorsalis pedis pulses are 0 on the right side and 0 on the left side.        Posterior tibial pulses are 0 on the right side and 0 on the left side.      Heart sounds: S1 normal and S2 normal. Heart sounds not distant. No midsystolic click. Murmur heard.   Systolic murmur is present.     Crisp metallic sound in aortic position        No friction rub. No gallop. No S3 sounds.      Comments:     Biphasic bilateral DP doppler signals       Monophasic bilateral PT doppler signals         Pulmonary:      Effort: Pulmonary effort is normal. No tachypnea, bradypnea, accessory muscle usage or respiratory distress. He is not intubated.      Breath sounds: Normal breath sounds. No stridor. No decreased breath sounds, wheezing or rales.   Chest:      Chest wall:  No tenderness.   Abdominal:      General: There is no distension or abdominal bruit.      Palpations: There is no mass or pulsatile mass.      Tenderness: There is no abdominal tenderness. There is no guarding or rebound.   Musculoskeletal:         General: No tenderness. Normal range of motion.      Cervical back: Normal range of motion and neck supple.   Lymphadenopathy:      Cervical: No cervical adenopathy.   Skin:     General: Skin is warm.      Coloration: Skin is not pale.      Findings: No erythema or rash.   Neurological:      Mental Status: He is alert and oriented to person, place, and time.      Cranial Nerves: No cranial nerve deficit.      Coordination: Coordination normal.      Deep Tendon Reflexes: Reflexes are normal and symmetric.   Psychiatric:         Behavior: Behavior normal.         Thought Content: Thought content normal.         Judgment: Judgment normal.       Assessment:     1. Atherosclerosis of native artery of both lower extremities with intermittent claudication    2. Atherosclerosis of lower extremity with claudication    3. Coronary artery disease of bypass graft of native heart with stable angina pectoris    4. Hyperlipidemia associated with type 2 diabetes mellitus    5. Renovascular hypertension    6. H/O mechanical aortic valve replacement    7. Bilateral carotid artery stenosis    8. Pulmonary heart disease    9. Hypertension associated with diabetes    10. LÓPEZ (dyspnea on exertion)    11. Stage 3b chronic kidney disease    12. Morbid obesity    13. Long term current use of anticoagulant therapy        Plan:     Aortogram with runoff of bilateral lower extremity.  Bilateral common femoral access with 6 possible upgrade to 7 [place] sheath.  Balloon angioplasty and stenting of bilateral common iliac without extending to the distal aorta.  Procedure will be done with CO2 angiography, extravascular ultrasound, and intravascular ultrasound in view of chronic kidney disease stage  4.    Risks, benefits and alternatives of peripheral catheterization and possible intervention were discussed with the patient. All questions were answered and informed consent obtained.     I discussed the importance of compliance with dual antiplatelet therapy with the patient to prevent acute or late stent thrombosis with premature discontinuation of the therapy.      Hold coumadin only 3 days before the case   He wants it done in 12/2022  Will obtain POOL + US prior to procedure         Continue with statin therapy.  Continue with plavix single antiplatelet therapy.  He cannot tolerate cilostazol in view of congestive heart failure with reduced ejection fraction.        Continue with current medical plan and lifestyle changes.  Return sooner for concerns or questions. If symptoms persist go to the ED  I have reviewed all pertinent data on this patient       I have reviewed the patient's medical history in detail and updated the computerized patient record.    Orders Placed This Encounter   Procedures    CV Ultrasound doppler arterial legs bilat     Standing Status:   Future     Standing Expiration Date:   9/26/2023     Order Specific Question:   Release to patient     Answer:   Immediate    Ankle Brachial Indices (POOL)     Standing Status:   Future     Standing Expiration Date:   9/26/2023     Order Specific Question:   Exam Type:     Answer:   Exercise     Order Specific Question:   Release to patient     Answer:   Immediate    Case Request-Cath Lab: AORTOGRAM, WITH SERIALOGRAPHY     Standing Status:   Standing     Number of Occurrences:   1     Order Specific Question:   CPT Code:     Answer:   RI  ANGIO AORTOBIFEMORAL W CATH [84103]     Order Specific Question:   CPT Code:     Answer:   RI REVASCULARIZE ILIAC ARTERY,ANGIOPLASTY/STENT, INITIAL VESSEL [46976]     Order Specific Question:   CPT Code:     Answer:   RI CORONARY IVUS ONLY [[id number]]     Order Specific Question:   PAT Visit Needed?     Answer:   No  PAT Needed [100]     Order Specific Question:   Medical Necessity:     Answer:   Medically Urgent [101]     Order Specific Question:   Is an on-site pathologist required for this procedure?     Answer:   N/A       Follow up as scheduled. Return sooner for concerns or questions            He expressed verbal understanding and agreed with the plan        Patient's Medications   New Prescriptions    No medications on file   Previous Medications    ALLOPURINOL (ZYLOPRIM) 100 MG TABLET    TAKE 1 TABLET EVERY DAY    AZELASTINE (ASTELIN) 137 MCG (0.1 %) NASAL SPRAY    1 spray by Nasal route 2 (two) times daily.    BUMETANIDE (BUMEX) 2 MG TABLET    Take 1 tablet (2 mg total) by mouth 2 (two) times daily.    CARVEDILOL (COREG) 12.5 MG TABLET    Take 1 tablet (12.5 mg total) by mouth 2 (two) times daily.    CLOPIDOGREL (PLAVIX) 75 MG TABLET    Take 1 tablet (75 mg total) by mouth once daily.    GLIMEPIRIDE (AMARYL) 1 MG TABLET    TAKE 1 TABLET (1 MG TOTAL) EVERY MORNING.    ISOSORBIDE MONONITRATE (IMDUR) 120 MG 24 HR TABLET    Take 1 tablet (120 mg total) by mouth once daily.    LISINOPRIL (PRINIVIL,ZESTRIL) 2.5 MG TABLET    TAKE 1 TABLET (2.5 MG TOTAL) BY MOUTH ONCE DAILY.    LOPERAMIDE (IMODIUM A-D) 2 MG TAB    Take 2 capsules at onset then 1 capsule as needed for diarrhea.    NIFEDIPINE (PROCARDIA-XL) 60 MG (OSM) 24 HR TABLET    Take 1 tablet (60 mg total) by mouth once daily.    NITROGLYCERIN (NITROSTAT) 0.4 MG SL TABLET    Place 1 tablet (0.4 mg total) under the tongue every 5 (five) minutes as needed. As needed for chest pain    OMEGA-3 ACID ETHYL ESTERS (LOVAZA) 1 GRAM CAPSULE    Take 2 capsules (2 g total) by mouth 2 (two) times daily.    OXYMETAZOLINE HCL (AFRIN, OXYMETAZOLINE, NASL)    1 spray by Each Nostril route once as needed (congestion).    ROSUVASTATIN (CRESTOR) 40 MG TAB    TAKE 1 TABLET EVERY EVENING.    WARFARIN (COUMADIN) 5 MG TABLET    Take 2 tablets (10mg) by mouth Sunday, then 1.5 tablets (7.5mg) by  mouth all other days as instructed by [facility].   Modified Medications    No medications on file   Discontinued Medications    No medications on file

## 2022-09-29 ENCOUNTER — HOSPITAL ENCOUNTER (OUTPATIENT)
Facility: HOSPITAL | Age: 82
Discharge: HOME | End: 2022-10-01
Attending: STUDENT IN AN ORGANIZED HEALTH CARE EDUCATION/TRAINING PROGRAM | Admitting: EMERGENCY MEDICINE
Payer: MEDICARE

## 2022-09-29 DIAGNOSIS — I15.0 RENOVASCULAR HYPERTENSION: ICD-10-CM

## 2022-09-29 DIAGNOSIS — R55 SYNCOPE: Primary | ICD-10-CM

## 2022-09-29 DIAGNOSIS — I49.9 ARRHYTHMIA: ICD-10-CM

## 2022-09-29 DIAGNOSIS — R07.9 CHEST PAIN: ICD-10-CM

## 2022-09-29 LAB
ALBUMIN SERPL BCP-MCNC: 4 G/DL (ref 3.5–5.2)
ALP SERPL-CCNC: 56 U/L (ref 55–135)
ALT SERPL W/O P-5'-P-CCNC: 19 U/L (ref 10–44)
ANION GAP SERPL CALC-SCNC: 12 MMOL/L (ref 8–16)
APTT BLDCRRT: 35 SEC (ref 21–32)
AST SERPL-CCNC: 20 U/L (ref 10–40)
AV INDEX (PROSTH): 0.78
AV MEAN GRADIENT: 17 MMHG
AV PEAK GRADIENT: 27 MMHG
AV VALVE AREA: 2.69 CM2
AV VELOCITY RATIO: 0.31
BACTERIA #/AREA URNS AUTO: NORMAL /HPF
BASOPHILS # BLD AUTO: 0.03 K/UL (ref 0–0.2)
BASOPHILS NFR BLD: 0.5 % (ref 0–1.9)
BILIRUB SERPL-MCNC: 0.4 MG/DL (ref 0.1–1)
BILIRUB UR QL STRIP: NEGATIVE
BNP SERPL-MCNC: 213 PG/ML (ref 0–99)
BSA FOR ECHO PROCEDURE: 1.93 M2
BUN SERPL-MCNC: 101 MG/DL (ref 8–23)
CALCIUM SERPL-MCNC: 10.7 MG/DL (ref 8.7–10.5)
CHLORIDE SERPL-SCNC: 108 MMOL/L (ref 95–110)
CLARITY UR REFRACT.AUTO: CLEAR
CO2 SERPL-SCNC: 20 MMOL/L (ref 23–29)
COLOR UR AUTO: COLORLESS
CREAT SERPL-MCNC: 3 MG/DL (ref 0.5–1.4)
CV ECHO LV RWT: 0.41 CM
DIFFERENTIAL METHOD: ABNORMAL
DOP CALC AO PEAK VEL: 2.6 M/S
DOP CALC AO VTI: 27.71 CM
DOP CALC LVOT AREA: 3.5 CM2
DOP CALC LVOT DIAMETER: 2.1 CM
DOP CALC LVOT PEAK VEL: 0.8 M/S
DOP CALC LVOT STROKE VOLUME: 74.43 CM3
DOP CALCLVOT PEAK VEL VTI: 21.5 CM
E WAVE DECELERATION TIME: 195.02 MSEC
E/A RATIO: 0.7
E/E' RATIO: 16 M/S
ECHO LV POSTERIOR WALL: 1.03 CM (ref 0.6–1.1)
EJECTION FRACTION: 50 %
EOSINOPHIL # BLD AUTO: 0.1 K/UL (ref 0–0.5)
EOSINOPHIL NFR BLD: 1.5 % (ref 0–8)
ERYTHROCYTE [DISTWIDTH] IN BLOOD BY AUTOMATED COUNT: 14.2 % (ref 11.5–14.5)
EST. GFR  (NO RACE VARIABLE): 20.2 ML/MIN/1.73 M^2
FRACTIONAL SHORTENING: 22 % (ref 28–44)
GLUCOSE SERPL-MCNC: 57 MG/DL (ref 70–110)
GLUCOSE UR QL STRIP: NEGATIVE
HCT VFR BLD AUTO: 33.2 % (ref 40–54)
HCV AB SERPL QL IA: NORMAL
HGB BLD-MCNC: 10.8 G/DL (ref 14–18)
HGB UR QL STRIP: ABNORMAL
HIV 1+2 AB+HIV1 P24 AG SERPL QL IA: NORMAL
HYALINE CASTS UR QL AUTO: 0 /LPF
IMM GRANULOCYTES # BLD AUTO: 0.01 K/UL (ref 0–0.04)
IMM GRANULOCYTES NFR BLD AUTO: 0.2 % (ref 0–0.5)
INR PPP: 2.6 (ref 0.8–1.2)
INTERVENTRICULAR SEPTUM: 1.04 CM (ref 0.6–1.1)
KETONES UR QL STRIP: NEGATIVE
LA MAJOR: 6.15 CM
LA MINOR: 6.31 CM
LA WIDTH: 5.32 CM
LACTATE SERPL-SCNC: 0.7 MMOL/L (ref 0.5–2.2)
LEFT ATRIUM SIZE: 4.68 CM
LEFT ATRIUM VOLUME INDEX MOD: 57.8 ML/M2
LEFT ATRIUM VOLUME INDEX: 68.7 ML/M2
LEFT ATRIUM VOLUME MOD: 110.94 CM3
LEFT ATRIUM VOLUME: 131.82 CM3
LEFT INTERNAL DIMENSION IN SYSTOLE: 3.93 CM (ref 2.1–4)
LEFT VENTRICLE DIASTOLIC VOLUME INDEX: 63.09 ML/M2
LEFT VENTRICLE DIASTOLIC VOLUME: 121.13 ML
LEFT VENTRICLE MASS INDEX: 101 G/M2
LEFT VENTRICLE SYSTOLIC VOLUME INDEX: 35 ML/M2
LEFT VENTRICLE SYSTOLIC VOLUME: 67.24 ML
LEFT VENTRICULAR INTERNAL DIMENSION IN DIASTOLE: 5.05 CM (ref 3.5–6)
LEFT VENTRICULAR MASS: 193.75 G
LEUKOCYTE ESTERASE UR QL STRIP: NEGATIVE
LV LATERAL E/E' RATIO: 11 M/S
LV SEPTAL E/E' RATIO: 29.33 M/S
LYMPHOCYTES # BLD AUTO: 1.5 K/UL (ref 1–4.8)
LYMPHOCYTES NFR BLD: 23.9 % (ref 18–48)
MAGNESIUM SERPL-MCNC: 1.7 MG/DL (ref 1.6–2.6)
MCH RBC QN AUTO: 30.4 PG (ref 27–31)
MCHC RBC AUTO-ENTMCNC: 32.5 G/DL (ref 32–36)
MCV RBC AUTO: 94 FL (ref 82–98)
MICROSCOPIC COMMENT: NORMAL
MONOCYTES # BLD AUTO: 0.6 K/UL (ref 0.3–1)
MONOCYTES NFR BLD: 9.9 % (ref 4–15)
MV PEAK A VEL: 1.25 M/S
MV PEAK E VEL: 0.88 M/S
MV STENOSIS PRESSURE HALF TIME: 56.56 MS
MV VALVE AREA P 1/2 METHOD: 3.89 CM2
NEUTROPHILS # BLD AUTO: 3.9 K/UL (ref 1.8–7.7)
NEUTROPHILS NFR BLD: 64 % (ref 38–73)
NITRITE UR QL STRIP: NEGATIVE
NRBC BLD-RTO: 0 /100 WBC
PH UR STRIP: 6 [PH] (ref 5–8)
PISA MRMAX VEL: 0.06 M/S
PISA TR MAX VEL: 3 M/S
PLATELET # BLD AUTO: 150 K/UL (ref 150–450)
PMV BLD AUTO: 10.8 FL (ref 9.2–12.9)
POCT GLUCOSE: 58 MG/DL (ref 70–110)
POCT GLUCOSE: 73 MG/DL (ref 70–110)
POCT GLUCOSE: 94 MG/DL (ref 70–110)
POTASSIUM SERPL-SCNC: 4.5 MMOL/L (ref 3.5–5.1)
PROT SERPL-MCNC: 8.1 G/DL (ref 6–8.4)
PROT UR QL STRIP: ABNORMAL
PROTHROMBIN TIME: 25.3 SEC (ref 9–12.5)
RA MAJOR: 5.37 CM
RA PRESSURE: 3 MMHG
RA WIDTH: 4.29 CM
RBC # BLD AUTO: 3.55 M/UL (ref 4.6–6.2)
RBC #/AREA URNS AUTO: 1 /HPF (ref 0–4)
RV TISSUE DOPPLER FREE WALL SYSTOLIC VELOCITY 1 (APICAL 4 CHAMBER VIEW): 9.55 CM/S
SINUS: 3.5 CM
SODIUM SERPL-SCNC: 140 MMOL/L (ref 136–145)
SP GR UR STRIP: 1.01 (ref 1–1.03)
STJ: 2.6 CM
TDI LATERAL: 0.08 M/S
TDI SEPTAL: 0.03 M/S
TDI: 0.06 M/S
TR MAX PG: 36 MMHG
TRICUSPID ANNULAR PLANE SYSTOLIC EXCURSION: 1.78 CM
TROPONIN I SERPL DL<=0.01 NG/ML-MCNC: 0.06 NG/ML (ref 0–0.03)
TSH SERPL DL<=0.005 MIU/L-ACNC: 3.71 UIU/ML (ref 0.4–4)
TV REST PULMONARY ARTERY PRESSURE: 39 MMHG
URN SPEC COLLECT METH UR: ABNORMAL
WBC # BLD AUTO: 6.06 K/UL (ref 3.9–12.7)
WBC #/AREA URNS AUTO: 1 /HPF (ref 0–5)

## 2022-09-29 PROCEDURE — 93005 ELECTROCARDIOGRAM TRACING: CPT

## 2022-09-29 PROCEDURE — 84484 ASSAY OF TROPONIN QUANT: CPT | Performed by: STUDENT IN AN ORGANIZED HEALTH CARE EDUCATION/TRAINING PROGRAM

## 2022-09-29 PROCEDURE — 96360 HYDRATION IV INFUSION INIT: CPT

## 2022-09-29 PROCEDURE — 99220 PR INITIAL OBSERVATION CARE,LEVL III: CPT | Mod: ,,, | Performed by: PHYSICIAN ASSISTANT

## 2022-09-29 PROCEDURE — 99285 EMERGENCY DEPT VISIT HI MDM: CPT | Mod: ,,, | Performed by: STUDENT IN AN ORGANIZED HEALTH CARE EDUCATION/TRAINING PROGRAM

## 2022-09-29 PROCEDURE — 86803 HEPATITIS C AB TEST: CPT | Performed by: PHYSICIAN ASSISTANT

## 2022-09-29 PROCEDURE — 99285 EMERGENCY DEPT VISIT HI MDM: CPT | Mod: 25

## 2022-09-29 PROCEDURE — 96361 HYDRATE IV INFUSION ADD-ON: CPT

## 2022-09-29 PROCEDURE — 83880 ASSAY OF NATRIURETIC PEPTIDE: CPT | Performed by: STUDENT IN AN ORGANIZED HEALTH CARE EDUCATION/TRAINING PROGRAM

## 2022-09-29 PROCEDURE — 87389 HIV-1 AG W/HIV-1&-2 AB AG IA: CPT | Performed by: PHYSICIAN ASSISTANT

## 2022-09-29 PROCEDURE — 87045 FECES CULTURE AEROBIC BACT: CPT

## 2022-09-29 PROCEDURE — 80053 COMPREHEN METABOLIC PANEL: CPT | Performed by: STUDENT IN AN ORGANIZED HEALTH CARE EDUCATION/TRAINING PROGRAM

## 2022-09-29 PROCEDURE — 81001 URINALYSIS AUTO W/SCOPE: CPT | Performed by: STUDENT IN AN ORGANIZED HEALTH CARE EDUCATION/TRAINING PROGRAM

## 2022-09-29 PROCEDURE — 82962 GLUCOSE BLOOD TEST: CPT | Mod: 91

## 2022-09-29 PROCEDURE — 99220 PR INITIAL OBSERVATION CARE,LEVL III: ICD-10-PCS | Mod: ,,, | Performed by: PHYSICIAN ASSISTANT

## 2022-09-29 PROCEDURE — 83735 ASSAY OF MAGNESIUM: CPT | Performed by: STUDENT IN AN ORGANIZED HEALTH CARE EDUCATION/TRAINING PROGRAM

## 2022-09-29 PROCEDURE — 94761 N-INVAS EAR/PLS OXIMETRY MLT: CPT

## 2022-09-29 PROCEDURE — 87427 SHIGA-LIKE TOXIN AG IA: CPT

## 2022-09-29 PROCEDURE — 90715 TDAP VACCINE 7 YRS/> IM: CPT | Performed by: STUDENT IN AN ORGANIZED HEALTH CARE EDUCATION/TRAINING PROGRAM

## 2022-09-29 PROCEDURE — 25000003 PHARM REV CODE 250

## 2022-09-29 PROCEDURE — 93010 EKG 12-LEAD: ICD-10-PCS | Mod: ,,, | Performed by: INTERNAL MEDICINE

## 2022-09-29 PROCEDURE — 87046 STOOL CULTR AEROBIC BACT EA: CPT

## 2022-09-29 PROCEDURE — 85610 PROTHROMBIN TIME: CPT | Performed by: STUDENT IN AN ORGANIZED HEALTH CARE EDUCATION/TRAINING PROGRAM

## 2022-09-29 PROCEDURE — 84443 ASSAY THYROID STIM HORMONE: CPT | Performed by: STUDENT IN AN ORGANIZED HEALTH CARE EDUCATION/TRAINING PROGRAM

## 2022-09-29 PROCEDURE — G0378 HOSPITAL OBSERVATION PER HR: HCPCS

## 2022-09-29 PROCEDURE — 85730 THROMBOPLASTIN TIME PARTIAL: CPT | Performed by: STUDENT IN AN ORGANIZED HEALTH CARE EDUCATION/TRAINING PROGRAM

## 2022-09-29 PROCEDURE — 63600175 PHARM REV CODE 636 W HCPCS: Performed by: STUDENT IN AN ORGANIZED HEALTH CARE EDUCATION/TRAINING PROGRAM

## 2022-09-29 PROCEDURE — 83605 ASSAY OF LACTIC ACID: CPT | Performed by: STUDENT IN AN ORGANIZED HEALTH CARE EDUCATION/TRAINING PROGRAM

## 2022-09-29 PROCEDURE — 90471 IMMUNIZATION ADMIN: CPT | Performed by: STUDENT IN AN ORGANIZED HEALTH CARE EDUCATION/TRAINING PROGRAM

## 2022-09-29 PROCEDURE — 93010 ELECTROCARDIOGRAM REPORT: CPT | Mod: ,,, | Performed by: INTERNAL MEDICINE

## 2022-09-29 PROCEDURE — 85025 COMPLETE CBC W/AUTO DIFF WBC: CPT | Performed by: STUDENT IN AN ORGANIZED HEALTH CARE EDUCATION/TRAINING PROGRAM

## 2022-09-29 PROCEDURE — 99285 PR EMERGENCY DEPT VISIT,LEVEL V: ICD-10-PCS | Mod: ,,, | Performed by: STUDENT IN AN ORGANIZED HEALTH CARE EDUCATION/TRAINING PROGRAM

## 2022-09-29 RX ORDER — BISACODYL 10 MG
10 SUPPOSITORY, RECTAL RECTAL DAILY PRN
Status: DISCONTINUED | OUTPATIENT
Start: 2022-09-29 | End: 2022-10-01 | Stop reason: HOSPADM

## 2022-09-29 RX ORDER — MAG HYDROX/ALUMINUM HYD/SIMETH 200-200-20
30 SUSPENSION, ORAL (FINAL DOSE FORM) ORAL 4 TIMES DAILY PRN
Status: DISCONTINUED | OUTPATIENT
Start: 2022-09-29 | End: 2022-10-01 | Stop reason: HOSPADM

## 2022-09-29 RX ORDER — LOPERAMIDE HYDROCHLORIDE 2 MG/1
2 CAPSULE ORAL 3 TIMES DAILY PRN
Status: DISCONTINUED | OUTPATIENT
Start: 2022-09-29 | End: 2022-09-30

## 2022-09-29 RX ORDER — SODIUM CHLORIDE 0.9 % (FLUSH) 0.9 %
5 SYRINGE (ML) INJECTION
Status: DISCONTINUED | OUTPATIENT
Start: 2022-09-29 | End: 2022-10-01 | Stop reason: HOSPADM

## 2022-09-29 RX ORDER — SODIUM CHLORIDE 0.9 % (FLUSH) 0.9 %
10 SYRINGE (ML) INJECTION
Status: DISCONTINUED | OUTPATIENT
Start: 2022-09-29 | End: 2022-10-01 | Stop reason: HOSPADM

## 2022-09-29 RX ORDER — ACETAMINOPHEN 325 MG/1
650 TABLET ORAL EVERY 4 HOURS PRN
Status: DISCONTINUED | OUTPATIENT
Start: 2022-09-29 | End: 2022-10-01 | Stop reason: HOSPADM

## 2022-09-29 RX ORDER — OMEGA-3-ACID ETHYL ESTERS 1 G/1
2 CAPSULE, LIQUID FILLED ORAL 2 TIMES DAILY
Status: DISCONTINUED | OUTPATIENT
Start: 2022-09-29 | End: 2022-10-01 | Stop reason: HOSPADM

## 2022-09-29 RX ORDER — LISINOPRIL 2.5 MG/1
2.5 TABLET ORAL DAILY
Status: DISCONTINUED | OUTPATIENT
Start: 2022-09-29 | End: 2022-09-30

## 2022-09-29 RX ORDER — WARFARIN SODIUM 5 MG/1
5 TABLET ORAL
Status: DISCONTINUED | OUTPATIENT
Start: 2022-09-30 | End: 2022-10-01 | Stop reason: HOSPADM

## 2022-09-29 RX ORDER — IPRATROPIUM BROMIDE AND ALBUTEROL SULFATE 2.5; .5 MG/3ML; MG/3ML
3 SOLUTION RESPIRATORY (INHALATION) EVERY 4 HOURS PRN
Status: DISCONTINUED | OUTPATIENT
Start: 2022-09-29 | End: 2022-10-01 | Stop reason: HOSPADM

## 2022-09-29 RX ORDER — TALC
6 POWDER (GRAM) TOPICAL NIGHTLY PRN
Status: DISCONTINUED | OUTPATIENT
Start: 2022-09-29 | End: 2022-10-01 | Stop reason: HOSPADM

## 2022-09-29 RX ORDER — ACETAMINOPHEN 500 MG
1000 TABLET ORAL EVERY 8 HOURS PRN
Status: DISCONTINUED | OUTPATIENT
Start: 2022-09-29 | End: 2022-10-01 | Stop reason: HOSPADM

## 2022-09-29 RX ORDER — WARFARIN 2 MG/1
2 TABLET ORAL DAILY
Status: DISCONTINUED | OUTPATIENT
Start: 2022-09-29 | End: 2022-09-29

## 2022-09-29 RX ORDER — NALOXONE HCL 0.4 MG/ML
0.02 VIAL (ML) INJECTION
Status: DISCONTINUED | OUTPATIENT
Start: 2022-09-29 | End: 2022-10-01 | Stop reason: HOSPADM

## 2022-09-29 RX ORDER — SIMETHICONE 80 MG
1 TABLET,CHEWABLE ORAL 4 TIMES DAILY PRN
Status: DISCONTINUED | OUTPATIENT
Start: 2022-09-29 | End: 2022-10-01 | Stop reason: HOSPADM

## 2022-09-29 RX ORDER — IBUPROFEN 200 MG
24 TABLET ORAL
Status: DISCONTINUED | OUTPATIENT
Start: 2022-09-29 | End: 2022-10-01 | Stop reason: HOSPADM

## 2022-09-29 RX ORDER — ATORVASTATIN CALCIUM 40 MG/1
80 TABLET, FILM COATED ORAL DAILY
Refills: 3 | Status: DISCONTINUED | OUTPATIENT
Start: 2022-09-29 | End: 2022-10-01 | Stop reason: HOSPADM

## 2022-09-29 RX ORDER — ALLOPURINOL 100 MG/1
100 TABLET ORAL DAILY
Status: DISCONTINUED | OUTPATIENT
Start: 2022-09-29 | End: 2022-10-01 | Stop reason: HOSPADM

## 2022-09-29 RX ORDER — WARFARIN 7.5 MG/1
7.5 TABLET ORAL
Status: DISCONTINUED | OUTPATIENT
Start: 2022-09-29 | End: 2022-10-01 | Stop reason: HOSPADM

## 2022-09-29 RX ORDER — PROCHLORPERAZINE EDISYLATE 5 MG/ML
5 INJECTION INTRAMUSCULAR; INTRAVENOUS EVERY 6 HOURS PRN
Status: DISCONTINUED | OUTPATIENT
Start: 2022-09-29 | End: 2022-10-01 | Stop reason: HOSPADM

## 2022-09-29 RX ORDER — IBUPROFEN 200 MG
16 TABLET ORAL
Status: DISCONTINUED | OUTPATIENT
Start: 2022-09-29 | End: 2022-10-01 | Stop reason: HOSPADM

## 2022-09-29 RX ORDER — GLUCAGON 1 MG
1 KIT INJECTION
Status: DISCONTINUED | OUTPATIENT
Start: 2022-09-29 | End: 2022-10-01 | Stop reason: HOSPADM

## 2022-09-29 RX ORDER — CARVEDILOL 12.5 MG/1
12.5 TABLET ORAL 2 TIMES DAILY
Status: DISCONTINUED | OUTPATIENT
Start: 2022-09-29 | End: 2022-10-01 | Stop reason: HOSPADM

## 2022-09-29 RX ORDER — NIFEDIPINE 30 MG/1
60 TABLET, EXTENDED RELEASE ORAL DAILY
Status: DISCONTINUED | OUTPATIENT
Start: 2022-09-29 | End: 2022-09-30

## 2022-09-29 RX ORDER — POLYETHYLENE GLYCOL 3350 17 G/17G
17 POWDER, FOR SOLUTION ORAL DAILY PRN
Status: DISCONTINUED | OUTPATIENT
Start: 2022-09-29 | End: 2022-10-01 | Stop reason: HOSPADM

## 2022-09-29 RX ORDER — ONDANSETRON 8 MG/1
8 TABLET, ORALLY DISINTEGRATING ORAL EVERY 8 HOURS PRN
Status: DISCONTINUED | OUTPATIENT
Start: 2022-09-29 | End: 2022-10-01 | Stop reason: HOSPADM

## 2022-09-29 RX ORDER — SODIUM CHLORIDE 9 MG/ML
INJECTION, SOLUTION INTRAVENOUS CONTINUOUS
Status: ACTIVE | OUTPATIENT
Start: 2022-09-29 | End: 2022-09-30

## 2022-09-29 RX ADMIN — ALLOPURINOL 100 MG: 100 TABLET ORAL at 02:09

## 2022-09-29 RX ADMIN — TETANUS TOXOID, REDUCED DIPHTHERIA TOXOID AND ACELLULAR PERTUSSIS VACCINE, ADSORBED 0.5 ML: 5; 2.5; 8; 8; 2.5 SUSPENSION INTRAMUSCULAR at 07:09

## 2022-09-29 RX ADMIN — NIFEDIPINE 60 MG: 30 TABLET, FILM COATED, EXTENDED RELEASE ORAL at 02:09

## 2022-09-29 RX ADMIN — LISINOPRIL 2.5 MG: 2.5 TABLET ORAL at 02:09

## 2022-09-29 RX ADMIN — CARVEDILOL 12.5 MG: 12.5 TABLET, FILM COATED ORAL at 09:09

## 2022-09-29 RX ADMIN — SODIUM CHLORIDE, SODIUM LACTATE, POTASSIUM CHLORIDE, AND CALCIUM CHLORIDE 500 ML: .6; .31; .03; .02 INJECTION, SOLUTION INTRAVENOUS at 07:09

## 2022-09-29 RX ADMIN — WARFARIN SODIUM 7.5 MG: 7.5 TABLET ORAL at 04:09

## 2022-09-29 RX ADMIN — SODIUM CHLORIDE 500 ML: 0.9 INJECTION, SOLUTION INTRAVENOUS at 02:09

## 2022-09-29 RX ADMIN — OMEGA-3-ACID ETHYL ESTERS 2 G: 1 CAPSULE, LIQUID FILLED ORAL at 09:09

## 2022-09-29 RX ADMIN — ATORVASTATIN CALCIUM 80 MG: 40 TABLET, FILM COATED ORAL at 02:09

## 2022-09-29 RX ADMIN — SODIUM CHLORIDE: 0.9 INJECTION, SOLUTION INTRAVENOUS at 10:09

## 2022-09-29 NOTE — PROGRESS NOTES
PharmD Consult received for:  1.) Admit medication history/reconciliation:    Pending.    2.) Renally adjust all medications:    Estimated Creatinine Clearance: 19.5 mL/min (A) (based on SCr of 3 mg/dL (H)).   Medications reviewed, no dose adjustments needed   Patient looks like he is in an JIM, consider holding the lisinopril while in an JIM    3.) Warfarin dosing:     PHARMACY CONSULT NOTE: WARFARIN  Dariel Urbina is a 82 y.o. male on warfarin therapy for Mechanical Heart Valve. PharmD has been consulted for warfarin dosing.    Current order: warfarin 7.5 mg Sun, Thurs; 5 mg all other days  Home dose: warfarin 7.5 mg Sun, Thurs; 5 mg all other days  Coumadin clinic enrollment: Active  INR goal: 2-3    Lab Results   Component Value Date    INR 2.6 (H) 09/29/2022    INR 2.6 (H) 09/06/2022    INR 2.8 (H) 08/09/2022     Significant drug interactions:  Diet: Adult Diabetic without supplements     Recommendation(s):   Continue home regimen of warfarin 7.5 mg Sun, Thurs; 5 mg all other days  Pharmacy will continue to follow and monitor warfarin    Thank you for the consult,  January Linda PharmD, [facility]  Internal Medicine Clinical Pharmacy Specialist   Ext [phone]     **Note: Consults are reviewed Monday-Friday 7:00am-3:30pm. THE ABOVE RECOMMENDATIONS ARE ONLY SUGGESTED.THE RECOMMENDATIONS SHOULD BE CONSIDERED IN CONJUNCTION WITH ALL PATIENT FACTORS. **

## 2022-09-29 NOTE — ASSESSMENT & PLAN NOTE
H/O mechanical aortic valve replacement  - Daily coumadin per home nneka, pharmacy consulted for dosage  - Daily INR with INR goal 2-3

## 2022-09-29 NOTE — ED PROVIDER NOTES
Source of History  Patient  wife    Chief Complaint    Fall (Pt fell around 4am this morning and hit L side of head and L forearm. Skin tear present to L forearm and lac + bruise present to L forehead. Denies loss of consciousness. + blood thinners. Pt A&Ox4 in triage. )      History of Present Illness    Dariel Urbina is a 82 y.o. male presenting with syncope.  The patient the last couple mornings has been getting up and feeling lightheaded and then passing out.  The his wife is concerned about dehydration.  For the last 6 weeks he has had significant brown loose stool, without abdominal pain or discomfort.  He reports about a 30 lb weight loss in this time frame.  He was just seen by Cardiology yesterday, who did not comment on this particular issue.  However when he passed out again this morning, his wife wanted to come to the emergency department.  He states he thinks he hit his forehead on the toilet, and has a skin tear to left forearm.  He is currently taking Coumadin for mechanical valve.    Review of Systems    As per HPI and below:  Constitutional symptoms:  No chills, no sweats, no fever   Skin symptoms:  No rash    Eye symptoms:  No blurred vision  ENMT symptoms:  No sore throat    Respiratory symptoms:  No shortness of breath  Cardiovascular symptoms:  No chest pain   Gastrointestinal symptoms:  No abdominal pain, no nausea, no vomiting, + loose brown watery diarrhea without hematochezia or melena  Genitourinary symptoms:  No dysuria  Musculoskeletal symptoms:  No back pain, No joint pains or aches    Neurologic symptoms:  No headache, no weakness  Endocrine symptoms:  None except as in HPI     Past History    As per HPI and below:  Past Medical History:   Diagnosis Date    Anemia of chronic renal failure, stage 3 (moderate) 05/27/2015    Anticoagulant long-term use     Atherosclerosis of coronary artery bypass graft of native heart without angina pectoris 09/11/2012    3-27-18 [facility] Two vessel  coronary artery disease.   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    Bilateral carotid artery disease 02/09/2017    Bleeding from the nose     Bleeding nose 03/21/2018    Cataract     CKD (chronic kidney disease) stage 3, GFR 30-59 ml/min 05/27/2015    Claudication of left lower extremity 09/17/2014    Colon polyp     Encounter for blood transfusion     Gastroesophageal reflux disease without esophagitis 03/19/2018    Gastrointestinal hemorrhage associated with intestinal diverticulosis 04/01/2018    Glaucoma     H/O mechanical aortic valve replacement 09/17/2014    History of gout 09/26/2012    Hyperparathyroidism due to renal insufficiency 07/27/2015    Internal hemorrhoid 04/03/2018    Long term current use of anticoagulant therapy 09/26/2012    Mechanical heart valve present     Metabolic acidosis with normal anion gap and bicarbonate losses 03/20/2018    Mixed hyperlipidemia 09/26/2012    NSTEMI (non-ST elevated myocardial infarction) 03/21/2018    Obesity, diabetes, and hypertension syndrome 02/23/2016    PVD (peripheral vascular disease) 09/11/2012    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    Type 2 diabetes mellitus with diabetic peripheral angiopathy without gangrene 05/27/2015    Type 2 diabetes mellitus with stage 3 chronic kidney disease, without long-term current use of insulin 10/02/2013       Past Surgical History:   Procedure Laterality Date    CARDIAC CATHETERIZATION      CARDIAC VALVE REPLACEMENT      CARDIAC VALVE SURGERY      CARPAL TUNNEL RELEASE Right 5/19/2020    Procedure: RELEASE, CARPAL TUNNEL;  Surgeon: Rupesh Norris Jr., MD;  Location: [facility];  Service: Plastics;  Laterality: Right;    COLON SURGERY      COLONOSCOPY N/A 3/31/2017    Procedure: COLONOSCOPY;  Surgeon: Bruno Raymond MD;  Location: [address];  Service: Endoscopy;  Laterality: N/A;  Patient's wife requesting date.    COLONOSCOPY N/A 4/3/2018    Procedure: COLONOSCOPY;  Surgeon:  Bonifacio Pelletier MD;  Location: [facility] ENDO (2ND FLR);  Service: Endoscopy;  Laterality: N/A;    COLONOSCOPY N/A 2018    Procedure: COLONOSCOPY;  Surgeon: Kam Barba MD;  Location: [facility] ENDO (2ND FLR);  Service: Endoscopy;  Laterality: N/A;  2nd floor: PA pressure 49; hx of moderate-severe valve disease     per Coumadin clinic-Patient can hold 5 days with lovenox bridge       ok to schedule per Katarina    CORONARY ANGIOGRAPHY N/A 10/4/2021    Procedure: Left heart cath +/- peripheral angiogram;  Surgeon: Jose Ruiz MD;  Location: [facility] CATH LAB;  Service: Cardiology;  Laterality: N/A;    CORONARY ANGIOPLASTY      CORONARY ARTERY BYPASS GRAFT      CORONARY BYPASS GRAFT ANGIOGRAPHY  10/4/2021    Procedure: Bypass graft study;  Surgeon: Jose Ruiz MD;  Location: [facility] CATH LAB;  Service: Cardiology;;    HERNIA REPAIR      SPINE SURGERY      VASECTOMY         Social History     Socioeconomic History    Marital status:      Spouse name: Ameena    Number of children: 3   Occupational History    Occupation: retired   Tobacco Use    Smoking status: Former     Packs/day: 1.00     Years: 20.00     Pack years: 20.00     Types: Cigarettes     Quit date: 1980     Years since quittin.0    Smokeless tobacco: Never   Substance and Sexual Activity    Alcohol use: No    Drug use: No    Sexual activity: Not Currently     Social Determinants of Health     Financial Resource Strain: Low Risk     Difficulty of Paying Living Expenses: Not hard at all   Food Insecurity: No Food Insecurity    Worried About Running Out of Food in the Last Year: Never true    Ran Out of Food in the Last Year: Never true   Transportation Needs: No Transportation Needs    Lack of Transportation (Medical): No    Lack of Transportation (Non-Medical): No   Physical Activity: Inactive    Days of Exercise per Week: 0 days    Minutes of Exercise per Session: 0 min   Stress: No Stress Concern Present    Feeling of Stress : Not at all    Social Connections: Moderately Integrated    Frequency of Communication with Friends and Family: Once a week    Frequency of Social Gatherings with Friends and Family: More than three times a week    Attends [employer] Services: Never    Active Member of Clubs or Organizations: Yes    Attends Club or Organization Meetings: More than 4 times per year    Marital Status:    Housing Stability: Low Risk     Unable to Pay for Housing in the Last Year: No    Number of Places Lived in the Last Year: 1    Unstable Housing in the Last Year: No       Family History   Problem Relation Age of Onset    Heart failure Mother     Heart disease Mother     Heart failure Father     Heart disease Father     Alcohol abuse Father     Heart failure Brother     Heart disease Brother     Diabetes Brother     No Known Problems Sister     No Known Problems Maternal Grandmother     No Known Problems Maternal Grandfather     No Known Problems Paternal Grandmother     No Known Problems Paternal Grandfather     Heart disease Sister     No Known Problems Maternal Aunt     No Known Problems Maternal Uncle     No Known Problems Paternal Aunt     No Known Problems Paternal Uncle     Amblyopia Neg Hx     Blindness Neg Hx     Cancer Neg Hx     Cataracts Neg Hx     Glaucoma Neg Hx     Hypertension Neg Hx     Macular degeneration Neg Hx     Retinal detachment Neg Hx     Strabismus Neg Hx     Stroke Neg Hx     Thyroid disease Neg Hx     Anemia Neg Hx     Arrhythmia Neg Hx     Asthma Neg Hx     Clotting disorder Neg Hx     Fainting Neg Hx     Heart attack Neg Hx     Hyperlipidemia Neg Hx     Atrial Septal Defect Neg Hx     Melanoma Neg Hx        Review of patient's allergies indicates:   Allergen Reactions    Fosinopril      Intolerance- elevates potassium level      Losartan      Intolerance- elevates potassium level       No current facility-administered medications on file prior to encounter.     Current Outpatient Medications on File Prior to  Encounter   Medication Sig Dispense Refill    allopurinoL (ZYLOPRIM) 100 MG tablet TAKE 1 TABLET EVERY DAY 90 tablet 3    bumetanide (BUMEX) 2 MG tablet Take 1 tablet (2 mg total) by mouth 2 (two) times daily. 60 tablet 11    carvediloL (COREG) 12.5 MG tablet Take 1 tablet (12.5 mg total) by mouth 2 (two) times daily. 180 tablet 3    glimepiride (AMARYL) 1 MG tablet TAKE 1 TABLET (1 MG TOTAL) EVERY MORNING. 90 tablet 3    isosorbide mononitrate (IMDUR) 120 MG 24 hr tablet Take 1 tablet (120 mg total) by mouth once daily. 90 tablet 4    lisinopriL (PRINIVIL,ZESTRIL) 2.5 MG tablet TAKE 1 TABLET (2.5 MG TOTAL) BY MOUTH ONCE DAILY. 90 tablet 3    loperamide (IMODIUM A-D) 2 mg Tab Take 2 capsules at onset then 1 capsule as needed for diarrhea.      NIFEdipine (PROCARDIA-XL) 60 MG (OSM) 24 hr tablet Take 1 tablet (60 mg total) by mouth once daily. 90 tablet 4    omega-3 acid ethyl esters (LOVAZA) 1 gram capsule Take 2 capsules (2 g total) by mouth 2 (two) times daily. (Patient taking differently: Take 1 g by mouth 2 (two) times daily.) 360 capsule 5    rosuvastatin (CRESTOR) 40 MG Tab TAKE 1 TABLET EVERY EVENING. 90 tablet 3    warfarin (COUMADIN) 5 MG tablet Take 2 tablets (10mg) by mouth Sunday, then 1.5 tablets (7.5mg) by mouth all other days as instructed by [facility] Clinic. (Patient taking differently: Take 1 tablet (5 mg) by mouth Monday, Wednesday & Friday then 1.5 tablets (7.5mg) by mouth all other days (Tues, Thurs, Sat & Sun) as instructed by Coumadin Clinic.) 143 tablet 0    azelastine (ASTELIN) 137 mcg (0.1 %) nasal spray 1 spray by Nasal route 2 (two) times daily.      clopidogreL (PLAVIX) 75 mg tablet Take 1 tablet (75 mg total) by mouth once daily. 30 tablet 11    nitroGLYCERIN (NITROSTAT) 0.4 MG SL tablet Place 1 tablet (0.4 mg total) under the tongue every 5 (five) minutes as needed. As needed for chest pain 90 tablet 4    oxymetazoline HCl (AFRIN, OXYMETAZOLINE, NASL) 1 spray by Each Nostril route once  as needed (congestion).         Physical Exam    Reviewed nursing notes.  Vitals:    09/29/22 0526   BP: (!) 122/58   BP Location: Right arm   Patient Position: Sitting   Pulse: 63   Resp: 18   Temp: 97.8 °F (36.6 °C)   TempSrc: Oral   SpO2: 98%   Weight: 76.7 kg (169 lb)     General:  Alert, no acute distress.    Skin:  Warm, dry, intact.  No rash.  Head:  Normocephalic, small hematoma to L forehead with abrasion   Neck:  Supple.   HEENT:  Pupils are equal and round, appropriate for room, extraocular movements are intact.  Normal phonation.  Dry mucous membranes.  Cardiovascular: bradycardic rate (50s) with PVCs and irregularity, Normal peripheral perfusion, No edema.  Systolic murmur   Respiratory:  Lungs are clear to auscultation, respirations are non-labored, breath sounds are equal.    Gastrointestinal:  Soft, Nontender, Non distended.    Back:  Nontender.   Musculoskeletal:  Normal range of motion observed. L forearm abrasion  Neurological:  Alert and oriented to person, place, time, and situation.  No focal deficits observed.   Psychiatric:  Cooperative, appropriate mood & affect.       Initial MDM    82-year-old male presenting after syncopal episode at home with a fall with a hematoma/abrasion to the forehead and abrasion left forearm without accompanying bony deformity.  Patient placed on monitor an EKG obtained which was concerning for frequent PVC, known left bundle branch block with concern for irregularly rhythm upon obseriving the cardiac monitor.  Differential for his syncope includes hypertension/hypovolemia secondary to ongoing diarrhea, coagulopathy. Ich, dysrhythmia, ACS.  Will check labs, TSH, magnesium, INR, CBC, renal function, and troponin.  Will check chest x-ray, CT head, CT C-spine for traumatic evaluation.  Will also check EKG and rhythm strip.  Will update tdap  His abrasions need wound care, irrigation and bandage, no sutures  I decided to obtain the patient's medical  records.    Medications   Tdap (BOOSTRIX) vaccine injection 0.5 mL (has no administration in time range)       Results and ED Course    Labs Reviewed   APTT - Abnormal; Notable for the following components:       Result Value    aPTT 35.0 (*)     All other components within normal limits    Narrative:     Release to patient->Immediate   CBC W/ AUTO DIFFERENTIAL - Abnormal; Notable for the following components:    RBC 3.55 (*)     Hemoglobin 10.8 (*)     Hematocrit 33.2 (*)     All other components within normal limits    Narrative:     Release to patient->Immediate   COMPREHENSIVE METABOLIC PANEL - Abnormal; Notable for the following components:    CO2 20 (*)     Glucose 57 (*)      (*)     Creatinine 3.0 (*)     Calcium 10.7 (*)     eGFR 20.2 (*)     All other components within normal limits    Narrative:     Release to patient->Immediate   PROTIME-INR - Abnormal; Notable for the following components:    Prothrombin Time 25.3 (*)     INR 2.6 (*)     All other components within normal limits    Narrative:     Release to patient->Immediate   TROPONIN I - Abnormal; Notable for the following components:    Troponin I 0.055 (*)     All other components within normal limits    Narrative:     Release to patient->Immediate   LACTIC ACID, PLASMA    Narrative:     Release to patient->Immediate   MAGNESIUM    Narrative:     Release to patient->Immediate   HIV 1 / 2 ANTIBODY   HEPATITIS C ANTIBODY   B-TYPE NATRIURETIC PEPTIDE   TSH   URINALYSIS, REFLEX TO URINE CULTURE       Imaging Results              CT Head Without Contrast (In process)                      CT Cervical Spine Without Contrast (In process)                      X-Ray Chest 1 View (Final result)  Result time 09/29/22 06:32:59      Final result by Loi Ponce MD (09/29/22 06:32:59)                   Impression:      No acute intrathoracic abnormality.  No detrimental change from prior.    Electronically signed by resident: Viji  Nella  Date:    09/29/2022  Time:    06:14    Electronically signed by: Loi Ponce MD  Date:    09/29/2022  Time:    06:32               Narrative:    EXAMINATION:  XR CHEST 1 VIEW    CLINICAL HISTORY:  Syncope and collapse    TECHNIQUE:  Single frontal view of the chest was performed.    COMPARISON:  Chest radiograph 03/20/2018    FINDINGS:  Cardiac monitoring wires overlie the chest wall.There is no consolidation, pleural effusion, or pneumothorax.    The cardiomediastinal silhouette is stable.  Calcifications at the aortic arch.  Prosthetic aortic valve is noted.    Sternal wires are intact and aligned.  No acute fracture.                                               EKG (if obtained)    EKG  Performed: 0551  Rate/Rhythm/Axis: 60 bpm, frequent PVC, irreg rhythm, L axis   ms  Qtc 510 ms  Impression: LBBB with LAD, with freq PVC and prolonged QTC, question irreg rhythm vs high PVC burden      EKG  Performed: 0622  Rate/Rhythm/Axis: 57 bpm, irreg rhythm, L axis   ms  Qtc 488 ms  Impression: LBBB with LAD with prolonged QTc irreg rhythm?      Impression and Plan    82 y.o. male with syncope in the setting of a mechanical valve on Coumadin with an irregular EKG and irregular rhythm strip concerning for possible heart block either Mobitz 1 or Mobitz 2 but it is unclear to me, there are P waves and likely freq PVC but I cannot discern a drop QRS after a P wave or complete dissociation of P waves from QRS complexes.  I discussed the case with on-call cardiology who is reviewing the EKGs for more complex interpretation to see if this could be related to syncope.  May be related to AV landon blockade which the patient is on carvedilol and nifedipine at baseline.  We will check orthostatics as well as volume depletion as stated above is likely also contributing factor - given small bolus due to concern for possible dysrhythmia until we can further delineate this.  Will need admission for the above  however at time of sign out to oncoming physician he is pending: CT read to r/o trauma, cardiology to call back for discussion of ECG findings and ultimate disposition.         Final diagnoses:  [R55] Syncope                 Patrice Hernandez, DO  09/29/22 6523

## 2022-09-29 NOTE — ASSESSMENT & PLAN NOTE
Patient's FSGs are controlled on current medication regimen.  Last A1c reviewed-   Lab Results   Component Value Date    HGBA1C 5.7 (H) 02/10/2022     Most recent fingerstick glucose reviewed-   Recent Labs   Lab 09/29/22  0846 09/29/22  0918   POCTGLUCOSE 58* 94     Current correctional scale  Low  Maintain anti-hyperglycemic dose as follows-   Antihyperglycemics (From admission, onward)    None        Hold Oral hypoglycemics while patient is in the hospital.

## 2022-09-29 NOTE — ASSESSMENT & PLAN NOTE
Patient experienced light-headedness and dizziness for the last 3 days. Today morning, patient experienced first episode of syncope w/ LOC and had an unwitnessed fall in the bathroom. Sustained a laceration to left arm and left forehead. Patient endorses on and off diarrhea for 6 weeks. Suspect syncope is provoked by volume depletion. Denies any chest pain or dyspnea. Admitted to hospital medicine for syncope work-up.     - Ordered Echo  - IV  ml bolus over 5 hours  - Maintenance fluids 100 ml/hr x 8 hours ordered  - Orthostatic vitals significant for drop in SBP from 150s to 107 from lying to standing  - Cardiology consult placed due to concern for heart block  - EKG shows LBBB w/ 1st degree AV block. No signs of 2nd or 3rd degree block

## 2022-09-29 NOTE — HPI
Dariel Urbina is a 82 y.o. male with PMHx of CAD s/p CABG, mechanical aortic valve, on chronic anticoagulation with Coumadin, CHF, HLD, HTN, T2DM and CKD presents to the ED with syncope. Patient states he has been feeling light headed when he wakes up for the past 3 mornings. Today morning, he woke up, went to use the restroom and had an unwitnessed syncopal episode w/ LOC. He hit his head on an unknown surface in the bathroom, sustained a laceration to his left forehead and left arm. He felt light headed and dizzy prior to the syncopal episode. The patient has also been having multiple episodes of diarrhea on and off for 6 weeks. States the diarrhea is very watery and brown in color, denies any hematochezia. Of note, patient states his appetite has decreased over the last 2-3 months, specifically since his 82st birthday. He states in drinks a lot of water still, but his food intake is minimal. Endorses recent weight loss, which he believes is due to his poor appetite. Denies any chest pain, dyspnea, F/N, recent sick contacts, recent antibiotic use or any abdominal pain.     ED: /103. CBC wnl. INR 2.6. CMP: , Cr 3.0, Glucose 57. . Troponin 0.055. UA wnl. CXR:    No acute intrathoracic abnormality.  No detrimental change from prior. CT Head and Cervical Spine without contrast: No acute intracranial abnormality, Multilevel degenerative changes of the cervical spine. Echo ordered.

## 2022-09-29 NOTE — PLAN OF CARE
Problem: Adult Inpatient Plan of Care  Goal: Plan of Care Review  Outcome: Ongoing, Progressing     Problem: Adult Inpatient Plan of Care  Goal: Absence of Hospital-Acquired Illness or Injury  Outcome: Ongoing, Progressing     Problem: Adult Inpatient Plan of Care  Goal: Readiness for Transition of Care  Outcome: Ongoing, Progressing     Problem: Infection  Goal: Absence of Infection Signs and Symptoms  Outcome: Ongoing, Progressing     Problem: Diabetes Comorbidity  Goal: Blood Glucose Level Within Targeted Range  Outcome: Ongoing, Progressing     Problem: Fall Injury Risk  Goal: Absence of Fall and Fall-Related Injury  Outcome: Ongoing, Progressing

## 2022-09-29 NOTE — Clinical Note
Diagnosis: Syncope [206001]   Future Attending Provider: DESTINEE MCNEILL [[id number]]   Admitting Provider:: BONNIE PARHAM [[id number]]

## 2022-09-29 NOTE — SUBJECTIVE & OBJECTIVE
Past Medical History:   Diagnosis Date    Anemia of chronic renal failure, stage 3 (moderate) 05/27/2015    Anticoagulant long-term use     Atherosclerosis of coronary artery bypass graft of native heart without angina pectoris 09/11/2012    3-27-18 [facility] Two vessel coronary artery disease.   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    Bilateral carotid artery disease 02/09/2017    Bleeding from the nose     Bleeding nose 03/21/2018    Cataract     CKD (chronic kidney disease) stage 3, GFR 30-59 ml/min 05/27/2015    Claudication of left lower extremity 09/17/2014    Colon polyp     Encounter for blood transfusion     Gastroesophageal reflux disease without esophagitis 03/19/2018    Gastrointestinal hemorrhage associated with intestinal diverticulosis 04/01/2018    Glaucoma     H/O mechanical aortic valve replacement 09/17/2014    History of gout 09/26/2012    Hyperparathyroidism due to renal insufficiency 07/27/2015    Internal hemorrhoid 04/03/2018    Long term current use of anticoagulant therapy 09/26/2012    Mechanical heart valve present     Metabolic acidosis with normal anion gap and bicarbonate losses 03/20/2018    Mixed hyperlipidemia 09/26/2012    NSTEMI (non-ST elevated myocardial infarction) 03/21/2018    Obesity, diabetes, and hypertension syndrome 02/23/2016    PVD (peripheral vascular disease) 09/11/2012    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    Syncope 9/29/2022    Type 2 diabetes mellitus with diabetic peripheral angiopathy without gangrene 05/27/2015    Type 2 diabetes mellitus with stage 3 chronic kidney disease, without long-term current use of insulin 10/02/2013       Past Surgical History:   Procedure Laterality Date    CARDIAC CATHETERIZATION      CARDIAC VALVE REPLACEMENT      CARDIAC VALVE SURGERY      CARPAL TUNNEL RELEASE Right 5/19/2020    Procedure: RELEASE, CARPAL TUNNEL;  Surgeon: Rupesh Norris Jr., MD;  Location: [facility];  Service: Plastics;  Laterality:  Right;    COLON SURGERY      COLONOSCOPY N/A 3/31/2017    Procedure: COLONOSCOPY;  Surgeon: Bruno Raymond MD;  Location: [facility] ENDO (4TH FLR);  Service: Endoscopy;  Laterality: N/A;  Patient's wife requesting date.    COLONOSCOPY N/A 4/3/2018    Procedure: COLONOSCOPY;  Surgeon: Bonifacio Pelletier MD;  Location: [facility] ENDO (2ND FLR);  Service: Endoscopy;  Laterality: N/A;    COLONOSCOPY N/A 2018    Procedure: COLONOSCOPY;  Surgeon: Kam Barba MD;  Location: [facility] ENDO (2ND FLR);  Service: Endoscopy;  Laterality: N/A;  2nd floor: PA pressure 49; hx of moderate-severe valve disease     per Coumadin clinic-Patient can hold 5 days with lovenox bridge       ok to schedule per Katarina    CORONARY ANGIOGRAPHY N/A 10/4/2021    Procedure: Left heart cath +/- peripheral angiogram;  Surgeon: Jose Ruiz MD;  Location: [facility] CATH LAB;  Service: Cardiology;  Laterality: N/A;    CORONARY ANGIOPLASTY      CORONARY ARTERY BYPASS GRAFT      CORONARY BYPASS GRAFT ANGIOGRAPHY  10/4/2021    Procedure: Bypass graft study;  Surgeon: Jose Ruiz MD;  Location: [facility] CATH LAB;  Service: Cardiology;;    HERNIA REPAIR      SPINE SURGERY      VASECTOMY         Review of patient's allergies indicates:   Allergen Reactions    Fosinopril      Intolerance- elevates potassium level      Losartan      Intolerance- elevates potassium level       (Not in a hospital admission)    Family History       Problem Relation (Age of Onset)    Alcohol abuse Father    Diabetes Brother    Heart disease Mother, Father, Brother, Sister    Heart failure Mother, Father, Brother    No Known Problems Sister, Maternal Grandmother, Maternal Grandfather, Paternal Grandmother, Paternal Grandfather, Maternal Aunt, Maternal Uncle, Paternal Aunt, Paternal Uncle          Tobacco Use    Smoking status: Former     Packs/day: 1.00     Years: 20.00     Pack years: 20.00     Types: Cigarettes     Quit date: 1980     Years since quittin.0    Smokeless tobacco:  Never   Substance and Sexual Activity    Alcohol use: No    Drug use: No    Sexual activity: Not Currently     Review of Systems   Constitutional: Positive for malaise/fatigue.   Cardiovascular:  Positive for syncope.   Gastrointestinal:  Positive for diarrhea.   All other systems reviewed and are negative.  Objective:     Vital Signs (Most Recent):  Temp: 98 °F (36.7 °C) (09/29/22 0750)  Pulse: 87 (09/29/22 0750)  Resp: 20 (09/29/22 0750)  BP: (!) 103/52 (09/29/22 0750)  SpO2: 100 % (09/29/22 0733)   Vital Signs (24h Range):  Temp:  [97.6 °F (36.4 °C)-98 °F (36.7 °C)] 98 °F (36.7 °C)  Pulse:  [63-87] 87  Resp:  [18-20] 20  SpO2:  [98 %-100 %] 100 %  BP: (103-159)/(52-70) 103/52     Weight: 76.7 kg (169 lb)  Body mass index is 27.28 kg/m².    SpO2: 100 %  O2 Device (Oxygen Therapy): room air    No intake or output data in the 24 hours ending 09/29/22 0852    Lines/Drains/Airways       Peripheral Intravenous Line  Duration                  Peripheral IV - Single Lumen 09/29/22 0605 20 G Left Antecubital <1 day                    Physical Exam  Vitals and nursing note reviewed.   Constitutional:       Appearance: Normal appearance.   HENT:      Head: Normocephalic and atraumatic.      Right Ear: External ear normal.      Left Ear: External ear normal.      Nose: Nose normal.      Mouth/Throat:      Mouth: Mucous membranes are moist.   Eyes:      Pupils: Pupils are equal, round, and reactive to light.   Neck:      Comments: No jvd   Cardiovascular:      Rate and Rhythm: Normal rate and regular rhythm.      Pulses: Normal pulses.      Comments: Mechanical click   Pulmonary:      Effort: Pulmonary effort is normal.   Abdominal:      General: Abdomen is flat.   Musculoskeletal:         General: Normal range of motion.      Cervical back: Normal range of motion.      Right lower leg: No edema.      Left lower leg: No edema.   Skin:     General: Skin is warm and dry.      Capillary Refill: Capillary refill takes less than 2  seconds.   Neurological:      General: No focal deficit present.      Mental Status: He is alert and oriented to person, place, and time.       Significant Labs: All pertinent lab results from the last 24 hours have been reviewed.    Significant Imaging:  reviewed

## 2022-09-29 NOTE — ED NOTES
Dariel Urbina, an 82 y.o. male presents to the ED following fall at home. PT reports that he got up from bed around 4 am and had a syncopal episode while in the bathroom. Hit L side of head, small lac noted to L forehead, bleeding controlled. Skin tear to L arm, dsg applied, bleeding controlled. Pt reports he felt dizzy prior to LOC. States this has been occurring every morning for the past few days. Reports he has been having episodes of diarrhea and is concerned he is dehydrated.     Denies any neck/back pain, blurry vision, headache. Denies CP, SOB, abd pain, N/V. AAOx4.     Review of patient's allergies indicates:   Allergen Reactions    Fosinopril      Intolerance- elevates potassium level      Losartan      Intolerance- elevates potassium level     Chief Complaint   Patient presents with    Fall     Pt fell around 4am this morning and hit L side of head and L forearm. Skin tear present to L forearm and lac + bruise present to L forehead. Denies loss of consciousness. + blood thinners. Pt A&Ox4 in triage.      Past Medical History:   Diagnosis Date    Anemia of chronic renal failure, stage 3 (moderate) 05/27/2015    Anticoagulant long-term use     Atherosclerosis of coronary artery bypass graft of native heart without angina pectoris 09/11/2012    3-27-18 [facility] Two vessel coronary artery disease.   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    Bilateral carotid artery disease 02/09/2017    Bleeding from the nose     Bleeding nose 03/21/2018    Cataract     CKD (chronic kidney disease) stage 3, GFR 30-59 ml/min 05/27/2015    Claudication of left lower extremity 09/17/2014    Colon polyp     Encounter for blood transfusion     Gastroesophageal reflux disease without esophagitis 03/19/2018    Gastrointestinal hemorrhage associated with intestinal diverticulosis 04/01/2018    Glaucoma     H/O mechanical aortic valve replacement 09/17/2014    History of gout 09/26/2012     Hyperparathyroidism due to renal insufficiency 07/27/2015    Internal hemorrhoid 04/03/2018    Long term current use of anticoagulant therapy 09/26/2012    Mechanical heart valve present     Metabolic acidosis with normal anion gap and bicarbonate losses 03/20/2018    Mixed hyperlipidemia 09/26/2012    NSTEMI (non-ST elevated myocardial infarction) 03/21/2018    Obesity, diabetes, and hypertension syndrome 02/23/2016    PVD (peripheral vascular disease) 09/11/2012    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    Type 2 diabetes mellitus with diabetic peripheral angiopathy without gangrene 05/27/2015    Type 2 diabetes mellitus with stage 3 chronic kidney disease, without long-term current use of insulin 10/02/2013

## 2022-09-29 NOTE — ED NOTES
Pt AAOx4 w/ VSS. Pt resting comfortably in NAD in ED bed low/locked. RR even & unlabored. Pt rates pain 0/10. Pt aware needing urine specimen.

## 2022-09-29 NOTE — ASSESSMENT & PLAN NOTE
- Continue home coreg 12.5 mg BID  - Continue lisinopril 2.5 mg daily  - Continue home nifedipine 60 mg daily  - Patient is orthostatic  - Fluid repletion and monitor BP with q4h vitals

## 2022-09-29 NOTE — SUBJECTIVE & OBJECTIVE
Past Medical History:   Diagnosis Date    Anemia of chronic renal failure, stage 3 (moderate) 05/27/2015    Anticoagulant long-term use     Atherosclerosis of coronary artery bypass graft of native heart without angina pectoris 09/11/2012    3-27-18 [facility] Two vessel coronary artery disease.   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    Bilateral carotid artery disease 02/09/2017    Bleeding from the nose     Bleeding nose 03/21/2018    Cataract     CKD (chronic kidney disease) stage 3, GFR 30-59 ml/min 05/27/2015    Claudication of left lower extremity 09/17/2014    Colon polyp     Encounter for blood transfusion     Gastroesophageal reflux disease without esophagitis 03/19/2018    Gastrointestinal hemorrhage associated with intestinal diverticulosis 04/01/2018    Glaucoma     H/O mechanical aortic valve replacement 09/17/2014    History of gout 09/26/2012    Hyperparathyroidism due to renal insufficiency 07/27/2015    Internal hemorrhoid 04/03/2018    Long term current use of anticoagulant therapy 09/26/2012    Mechanical heart valve present     Metabolic acidosis with normal anion gap and bicarbonate losses 03/20/2018    Mixed hyperlipidemia 09/26/2012    NSTEMI (non-ST elevated myocardial infarction) 03/21/2018    Obesity, diabetes, and hypertension syndrome 02/23/2016    PVD (peripheral vascular disease) 09/11/2012    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    Syncope 9/29/2022    Type 2 diabetes mellitus with diabetic peripheral angiopathy without gangrene 05/27/2015    Type 2 diabetes mellitus with stage 3 chronic kidney disease, without long-term current use of insulin 10/02/2013       Past Surgical History:   Procedure Laterality Date    CARDIAC CATHETERIZATION      CARDIAC VALVE REPLACEMENT      CARDIAC VALVE SURGERY      CARPAL TUNNEL RELEASE Right 5/19/2020    Procedure: RELEASE, CARPAL TUNNEL;  Surgeon: Rupesh Norris Jr., MD;  Location: [facility];  Service: Plastics;  Laterality:  Right;    COLON SURGERY      COLONOSCOPY N/A 3/31/2017    Procedure: COLONOSCOPY;  Surgeon: Bruno Raymond MD;  Location: [facility] ENDO (4TH FLR);  Service: Endoscopy;  Laterality: N/A;  Patient's wife requesting date.    COLONOSCOPY N/A 4/3/2018    Procedure: COLONOSCOPY;  Surgeon: Bonifacio Pelletier MD;  Location: [facility] ENDO (2ND FLR);  Service: Endoscopy;  Laterality: N/A;    COLONOSCOPY N/A 8/13/2018    Procedure: COLONOSCOPY;  Surgeon: Kam Barba MD;  Location: [facility] ENDO (2ND FLR);  Service: Endoscopy;  Laterality: N/A;  2nd floor: PA pressure 49; hx of moderate-severe valve disease     per Coumadin clinic-Patient can hold 5 days with lovenox bridge       ok to schedule per Katarina    CORONARY ANGIOGRAPHY N/A 10/4/2021    Procedure: Left heart cath +/- peripheral angiogram;  Surgeon: Jose Ruiz MD;  Location: [facility] CATH LAB;  Service: Cardiology;  Laterality: N/A;    CORONARY ANGIOPLASTY      CORONARY ARTERY BYPASS GRAFT      CORONARY BYPASS GRAFT ANGIOGRAPHY  10/4/2021    Procedure: Bypass graft study;  Surgeon: Jose Ruiz MD;  Location: [facility] CATH LAB;  Service: Cardiology;;    HERNIA REPAIR      SPINE SURGERY      VASECTOMY         Review of patient's allergies indicates:   Allergen Reactions    Fosinopril      Intolerance- elevates potassium level      Losartan      Intolerance- elevates potassium level       No current facility-administered medications on file prior to encounter.     Current Outpatient Medications on File Prior to Encounter   Medication Sig    allopurinoL (ZYLOPRIM) 100 MG tablet TAKE 1 TABLET EVERY DAY    bumetanide (BUMEX) 2 MG tablet Take 1 tablet (2 mg total) by mouth 2 (two) times daily.    carvediloL (COREG) 12.5 MG tablet Take 1 tablet (12.5 mg total) by mouth 2 (two) times daily.    glimepiride (AMARYL) 1 MG tablet TAKE 1 TABLET (1 MG TOTAL) EVERY MORNING.    isosorbide mononitrate (IMDUR) 120 MG 24 hr tablet Take 1 tablet (120 mg total) by mouth once daily.    lisinopriL  (PRINIVIL,ZESTRIL) 2.5 MG tablet TAKE 1 TABLET (2.5 MG TOTAL) BY MOUTH ONCE DAILY.    loperamide (IMODIUM A-D) 2 mg Tab Take 2 capsules at onset then 1 capsule as needed for diarrhea.    NIFEdipine (PROCARDIA-XL) 60 MG (OSM) 24 hr tablet Take 1 tablet (60 mg total) by mouth once daily.    omega-3 acid ethyl esters (LOVAZA) 1 gram capsule Take 2 capsules (2 g total) by mouth 2 (two) times daily. (Patient taking differently: Take 1 g by mouth 2 (two) times daily.)    rosuvastatin (CRESTOR) 40 MG Tab TAKE 1 TABLET EVERY EVENING.    warfarin (COUMADIN) 5 MG tablet Take 2 tablets (10mg) by mouth , then 1.5 tablets (7.5mg) by mouth all other days as instructed by [facility]. (Patient taking differently: Take 1 tablet (5 mg) by mouth Monday, Wednesday & Friday then 1.5 tablets (7.5mg) by mouth all other days (Tues, Thurs, Sat & Sun) as instructed by [facility].)    azelastine (ASTELIN) 137 mcg (0.1 %) nasal spray 1 spray by Nasal route 2 (two) times daily.    clopidogreL (PLAVIX) 75 mg tablet Take 1 tablet (75 mg total) by mouth once daily.    nitroGLYCERIN (NITROSTAT) 0.4 MG SL tablet Place 1 tablet (0.4 mg total) under the tongue every 5 (five) minutes as needed. As needed for chest pain    [DISCONTINUED] oxymetazoline HCl (AFRIN, OXYMETAZOLINE, NASL) 1 spray by Each Nostril route once as needed (congestion).     Family History       Problem Relation (Age of Onset)    Alcohol abuse Father    Diabetes Brother    Heart disease Mother, Father, Brother, Sister    Heart failure Mother, Father, Brother    No Known Problems Sister, Maternal Grandmother, Maternal Grandfather, Paternal Grandmother, Paternal Grandfather, Maternal Aunt, Maternal Uncle, Paternal Aunt, Paternal Uncle          Tobacco Use    Smoking status: Former     Packs/day: 1.00     Years: 20.00     Pack years: 20.00     Types: Cigarettes     Quit date: 1980     Years since quittin.0    Smokeless tobacco: Never   Substance and Sexual  Activity    Alcohol use: No    Drug use: No    Sexual activity: Not Currently     Review of Systems   Constitutional:  Positive for appetite change, fatigue and unexpected weight change (2/2 appetite change). Negative for activity change, chills and fever.   HENT:  Negative for facial swelling and trouble swallowing.    Eyes:  Negative for photophobia and visual disturbance.   Respiratory:  Negative for chest tightness, shortness of breath and wheezing.    Cardiovascular:  Negative for chest pain, palpitations and leg swelling.   Gastrointestinal:  Positive for diarrhea. Negative for abdominal pain, constipation, nausea and vomiting.   Genitourinary:  Negative for dysuria, frequency, hematuria and urgency.   Musculoskeletal:  Negative for arthralgias, back pain and gait problem.   Skin:  Negative for color change and rash.   Neurological:  Positive for dizziness, syncope and light-headedness. Negative for weakness, numbness and headaches.   Psychiatric/Behavioral:  Negative for agitation and confusion. The patient is not nervous/anxious.    Objective:     Vital Signs (Most Recent):  Temp: 98.1 °F (36.7 °C) (09/29/22 1300)  Pulse: 63 (09/29/22 1300)  Resp: 18 (09/29/22 1300)  BP: (!) 143/63 (09/29/22 1300)  SpO2: 95 % (09/29/22 1300)   Vital Signs (24h Range):  Temp:  [97.6 °F (36.4 °C)-98.1 °F (36.7 °C)] 98.1 °F (36.7 °C)  Pulse:  [60-87] 63  Resp:  [16-20] 18  SpO2:  [95 %-100 %] 95 %  BP: (103-167)/(52-89) 143/63     Weight: 82.5 kg (181 lb 14.1 oz)  Body mass index is 29.36 kg/m².    Physical Exam  Vitals and nursing note reviewed.   Constitutional:       General: He is not in acute distress.     Appearance: He is well-developed.   HENT:      Head: Normocephalic and atraumatic.      Mouth/Throat:      Pharynx: No oropharyngeal exudate.   Eyes:      Conjunctiva/sclera: Conjunctivae normal.      Pupils: Pupils are equal, round, and reactive to light.   Cardiovascular:      Rate and Rhythm: Normal rate and regular  rhythm.      Heart sounds: Normal heart sounds.   Pulmonary:      Effort: Pulmonary effort is normal. No respiratory distress.      Breath sounds: Normal breath sounds. No wheezing.   Abdominal:      General: Bowel sounds are normal. There is no distension.      Palpations: Abdomen is soft.      Tenderness: There is no abdominal tenderness.   Musculoskeletal:         General: No tenderness. Normal range of motion.      Cervical back: Normal range of motion and neck supple.   Lymphadenopathy:      Cervical: No cervical adenopathy.   Skin:     General: Skin is warm and dry.      Capillary Refill: Capillary refill takes less than 2 seconds.      Findings: Lesion (Laceration on L forehead and L arm) present. No rash.   Neurological:      Mental Status: He is alert and oriented to person, place, and time.      Cranial Nerves: No cranial nerve deficit.      Sensory: No sensory deficit.      Coordination: Coordination normal.   Psychiatric:         Behavior: Behavior normal.         Thought Content: Thought content normal.         Judgment: Judgment normal.         CRANIAL NERVES     CN III, IV, VI   Pupils are equal, round, and reactive to light.     Significant Labs: All pertinent labs within the past 24 hours have been reviewed.  BMP:   Recent Labs   Lab 09/29/22  0601   GLU 57*      K 4.5      CO2 20*   *   CREATININE 3.0*   CALCIUM 10.7*   MG 1.7     CBC:   Recent Labs   Lab 09/29/22  0601   WBC 6.06   HGB 10.8*   HCT 33.2*        CMP:   Recent Labs   Lab 09/29/22  0601      K 4.5      CO2 20*   GLU 57*   *   CREATININE 3.0*   CALCIUM 10.7*   PROT 8.1   ALBUMIN 4.0   BILITOT 0.4   ALKPHOS 56   AST 20   ALT 19   ANIONGAP 12     Magnesium:   Recent Labs   Lab 09/29/22  0601   MG 1.7     Troponin:   Recent Labs   Lab 09/29/22  0601   TROPONINI 0.055*     Urine Studies:   Recent Labs   Lab 09/29/22  1057   COLORU Colorless*   APPEARANCEUA Clear   PHUR 6.0   SPECGRAV 1.010    PROTEINUA 1+*   GLUCUA Negative   KETONESU Negative   BILIRUBINUA Negative   OCCULTUA 1+*   NITRITE Negative   LEUKOCYTESUR Negative   RBCUA 1   WBCUA 1   BACTERIA Occasional   HYALINECASTS 0       Significant Imaging: I have reviewed all pertinent imaging results/findings within the past 24 hours.  Imaging Results              CT Head Without Contrast (Final result)  Result time 09/29/22 07:17:08      Final result by Loi Ponce MD (09/29/22 07:17:08)                   Impression:      No acute intracranial abnormality.    Mild left frontal scalp soft tissue edema.  No cranial fracture.  No traumatic malalignment or fracture of the spine.    Findings consistent with chronic microvascular ischemic changes, remote lacunar infarcts, and generalized cerebral volume loss.    Multilevel degenerative changes of the cervical spine.    Electronically signed by resident: Viji Carmen  Date:    09/29/2022  Time:    06:52    Electronically signed by: Loi Ponce MD  Date:    09/29/2022  Time:    07:17               Narrative:    EXAMINATION:  CT HEAD WITHOUT CONTRAST; CT CERVICAL SPINE WITHOUT CONTRAST    CLINICAL HISTORY:  Syncope, recurrent;fall on coumadin, syncope;; Compression fracture, cervical;fall r/o fx;    TECHNIQUE:  Low dose axial CT images obtained throughout the head as well as the cervical spine without intravenous contrast. Sagittal and coronal reconstructions were performed.    COMPARISON:  MRI brain 11/26/2003    FINDINGS:  Head:    Intracranial compartment:    Prominence of the ventricles and sulci compatible with generalized cerebral volume loss.  No hydrocephalus.   No extra-axial blood or fluid collections.    There is hypoattenuation in the supratentorial white matter, nonspecific but most likely reflecting chronic microvascular ischemic changes.  Punctate hypodensity in left basal ganglia and thalamus, likely remote lacunar infarcts.  Additional small remote lacunar infarct in the left  cerebellum.  No major vascular distribution infarct. No acute hemorrhage.  No mass effect or midline shift.    Calcifications of the bilateral cavernous ICAs are noted.    Skull/extracranial contents (limited evaluation): Focal area of soft tissue swelling overlying the left frontal bone.  No underlying fracture.  No fracture. Diffuse paranasal sinus membrane thickening.    Cervical spine:    The predental space is maintained.  Atlantodental degenerative changes are noted.  Chondrocalcinosis of the transverse ligament.    Vertebral height loss at C4 and C5.  Osseous fusion noted at C6-7.  Loss of cervical lordosis with grade 1 anterolisthesis of C7 on T1 and T1 on T2.  No acute fracture.  Lucencies noted courses C6-C7 endplate in favored to represent degenerative change.    Multilevel intervertebral disc height loss without osseous fusion at C6-C7.  Multilevel degenerative changes including posterior disc osteophyte complexes, facet arthropathy, and uncovertebral spurring.  Moderate to severe neural foraminal narrowing involving the right neural foramina at C3-C4 and C4-C5.  No high-grade spinal canal stenosis.    Limited evaluation of the intraspinal contents demonstrates no hematoma or mass.Paraspinal soft tissues exhibit no acute abnormalities. No acute abnormality within the imaged portions of the lungs.                                       CT Cervical Spine Without Contrast (Final result)  Result time 09/29/22 07:17:08      Final result by Loi Ponce MD (09/29/22 07:17:08)                   Impression:      No acute intracranial abnormality.    Mild left frontal scalp soft tissue edema.  No cranial fracture.  No traumatic malalignment or fracture of the spine.    Findings consistent with chronic microvascular ischemic changes, remote lacunar infarcts, and generalized cerebral volume loss.    Multilevel degenerative changes of the cervical spine.    Electronically signed by resident: Viji  Nella  Date:    09/29/2022  Time:    06:52    Electronically signed by: Loi Ponce MD  Date:    09/29/2022  Time:    07:17               Narrative:    EXAMINATION:  CT HEAD WITHOUT CONTRAST; CT CERVICAL SPINE WITHOUT CONTRAST    CLINICAL HISTORY:  Syncope, recurrent;fall on coumadin, syncope;; Compression fracture, cervical;fall r/o fx;    TECHNIQUE:  Low dose axial CT images obtained throughout the head as well as the cervical spine without intravenous contrast. Sagittal and coronal reconstructions were performed.    COMPARISON:  MRI brain 11/26/2003    FINDINGS:  Head:    Intracranial compartment:    Prominence of the ventricles and sulci compatible with generalized cerebral volume loss.  No hydrocephalus.   No extra-axial blood or fluid collections.    There is hypoattenuation in the supratentorial white matter, nonspecific but most likely reflecting chronic microvascular ischemic changes.  Punctate hypodensity in left basal ganglia and thalamus, likely remote lacunar infarcts.  Additional small remote lacunar infarct in the left cerebellum.  No major vascular distribution infarct. No acute hemorrhage.  No mass effect or midline shift.    Calcifications of the bilateral cavernous ICAs are noted.    Skull/extracranial contents (limited evaluation): Focal area of soft tissue swelling overlying the left frontal bone.  No underlying fracture.  No fracture. Diffuse paranasal sinus membrane thickening.    Cervical spine:    The predental space is maintained.  Atlantodental degenerative changes are noted.  Chondrocalcinosis of the transverse ligament.    Vertebral height loss at C4 and C5.  Osseous fusion noted at C6-7.  Loss of cervical lordosis with grade 1 anterolisthesis of C7 on T1 and T1 on T2.  No acute fracture.  Lucencies noted courses C6-C7 endplate in favored to represent degenerative change.    Multilevel intervertebral disc height loss without osseous fusion at C6-C7.  Multilevel degenerative changes  including posterior disc osteophyte complexes, facet arthropathy, and uncovertebral spurring.  Moderate to severe neural foraminal narrowing involving the right neural foramina at C3-C4 and C4-C5.  No high-grade spinal canal stenosis.    Limited evaluation of the intraspinal contents demonstrates no hematoma or mass.Paraspinal soft tissues exhibit no acute abnormalities. No acute abnormality within the imaged portions of the lungs.                                       X-Ray Chest 1 View (Final result)  Result time 09/29/22 06:32:59      Final result by Loi Ponce MD (09/29/22 06:32:59)                   Impression:      No acute intrathoracic abnormality.  No detrimental change from prior.    Electronically signed by resident: Viji Carmen  Date:    09/29/2022  Time:    06:14    Electronically signed by: Loi Ponce MD  Date:    09/29/2022  Time:    06:32               Narrative:    EXAMINATION:  XR CHEST 1 VIEW    CLINICAL HISTORY:  Syncope and collapse    TECHNIQUE:  Single frontal view of the chest was performed.    COMPARISON:  Chest radiograph 03/20/2018    FINDINGS:  Cardiac monitoring wires overlie the chest wall.There is no consolidation, pleural effusion, or pneumothorax.    The cardiomediastinal silhouette is stable.  Calcifications at the aortic arch.  Prosthetic aortic valve is noted.    Sternal wires are intact and aligned.  No acute fracture.

## 2022-09-29 NOTE — CONSULTS
[facility] - Emergency Dept  Interventional Cardiology  Consult Note    Patient Name: Dariel Urbina  MRN: [id number]  Admission Date: 9/29/2022  Hospital Length of Stay: 0 days  Code Status: Full Code   Attending Provider: Manny Shine MD  Consulting Provider: Raudel Neal MD  Primary Care Physician: Devon Langston MD  Principal Problem:Syncope    Patient information was obtained from patient and past medical records.     Inpatient consult to Cardiology  Consult performed by: Raudel Neal MD  Consult ordered by: Chandrika Elliott MD        Subjective:     Chief Complaint:  Syncope     HPI:  Dariel Urbina is a 81 yo M with coronary disease status post CABG, mechanical valve in aortic position, hypertension, hyperlipidemia, chronic kidney disease stage 4, chronic anticoagulation with Coumadin, peripheral arterial disease with worsening claudication to approaching 3, epistaxis, and congestive heart failure with ejection fraction 45% + grade 2 diastolic dysfunction who presents to the ED with syncope. He states over the past 2-3 days he has had syncope in the morning after getting up and ambulating. He states prior to his syncope he begins to feel light-headed and flushed and knows he is about to pass out. Denies preceding palpitations or angina. Additionally, for the past 3 days he has had severe diarrhea and feels weak and fatigued. Denies angina or any other focal complaints.     In the ED, his initial vital signs were stable and head CT did not demonstrate signs of ICH. ECG obtained demonstrated LBBB similar to prior and PACs/PVCs and 1st degree AV block. Labs significant for  (BL in the 60s) with cr of 3 which is near recent baseline,  which is near his baseline, INR 2.6 and otherwise unrevealing. Orthostatic vitals significant for drop in SBP from 150s to 107 from lying to standing. Cardiology then called due to concern for heart block causing syncope.       Past Medical History:    Diagnosis Date    Anemia of chronic renal failure, stage 3 (moderate) 05/27/2015    Anticoagulant long-term use     Atherosclerosis of coronary artery bypass graft of native heart without angina pectoris 09/11/2012    3-27-18 [facility] Two vessel coronary artery disease.   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    Bilateral carotid artery disease 02/09/2017    Bleeding from the nose     Bleeding nose 03/21/2018    Cataract     CKD (chronic kidney disease) stage 3, GFR 30-59 ml/min 05/27/2015    Claudication of left lower extremity 09/17/2014    Colon polyp     Encounter for blood transfusion     Gastroesophageal reflux disease without esophagitis 03/19/2018    Gastrointestinal hemorrhage associated with intestinal diverticulosis 04/01/2018    Glaucoma     H/O mechanical aortic valve replacement 09/17/2014    History of gout 09/26/2012    Hyperparathyroidism due to renal insufficiency 07/27/2015    Internal hemorrhoid 04/03/2018    Long term current use of anticoagulant therapy 09/26/2012    Mechanical heart valve present     Metabolic acidosis with normal anion gap and bicarbonate losses 03/20/2018    Mixed hyperlipidemia 09/26/2012    NSTEMI (non-ST elevated myocardial infarction) 03/21/2018    Obesity, diabetes, and hypertension syndrome 02/23/2016    PVD (peripheral vascular disease) 09/11/2012    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    Syncope 9/29/2022    Type 2 diabetes mellitus with diabetic peripheral angiopathy without gangrene 05/27/2015    Type 2 diabetes mellitus with stage 3 chronic kidney disease, without long-term current use of insulin 10/02/2013       Past Surgical History:   Procedure Laterality Date    CARDIAC CATHETERIZATION      CARDIAC VALVE REPLACEMENT      CARDIAC VALVE SURGERY      CARPAL TUNNEL RELEASE Right 5/19/2020    Procedure: RELEASE, CARPAL TUNNEL;  Surgeon: Rupesh Norris Jr., MD;  Location: [facility];  Service: Plastics;   Laterality: Right;    COLON SURGERY      COLONOSCOPY N/A 3/31/2017    Procedure: COLONOSCOPY;  Surgeon: Bruno Raymond MD;  Location: [facility] ENDO (4TH FLR);  Service: Endoscopy;  Laterality: N/A;  Patient's wife requesting date.    COLONOSCOPY N/A 4/3/2018    Procedure: COLONOSCOPY;  Surgeon: Bonifacio Pelletier MD;  Location: [facility] ENDO (2ND FLR);  Service: Endoscopy;  Laterality: N/A;    COLONOSCOPY N/A 8/13/2018    Procedure: COLONOSCOPY;  Surgeon: Kam Barba MD;  Location: [facility] ENDO (2ND FLR);  Service: Endoscopy;  Laterality: N/A;  2nd floor: PA pressure 49; hx of moderate-severe valve disease     per Coumadin clinic-Patient can hold 5 days with lovenox bridge       ok to schedule per Katarina    CORONARY ANGIOGRAPHY N/A 10/4/2021    Procedure: Left heart cath +/- peripheral angiogram;  Surgeon: Jose Ruiz MD;  Location: [facility] CATH LAB;  Service: Cardiology;  Laterality: N/A;    CORONARY ANGIOPLASTY      CORONARY ARTERY BYPASS GRAFT      CORONARY BYPASS GRAFT ANGIOGRAPHY  10/4/2021    Procedure: Bypass graft study;  Surgeon: Jose Ruiz MD;  Location: [facility] CATH LAB;  Service: Cardiology;;    HERNIA REPAIR      SPINE SURGERY      VASECTOMY         Review of patient's allergies indicates:   Allergen Reactions    Fosinopril      Intolerance- elevates potassium level      Losartan      Intolerance- elevates potassium level       (Not in a hospital admission)    Family History       Problem Relation (Age of Onset)    Alcohol abuse Father    Diabetes Brother    Heart disease Mother, Father, Brother, Sister    Heart failure Mother, Father, Brother    No Known Problems Sister, Maternal Grandmother, Maternal Grandfather, Paternal Grandmother, Paternal Grandfather, Maternal Aunt, Maternal Uncle, Paternal Aunt, Paternal Uncle          Tobacco Use    Smoking status: Former     Packs/day: 1.00     Years: 20.00     Pack years: 20.00     Types: Cigarettes     Quit date: 9/11/1980     Years since quitting:  42.0    Smokeless tobacco: Never   Substance and Sexual Activity    Alcohol use: No    Drug use: No    Sexual activity: Not Currently     Review of Systems   Constitutional: Positive for malaise/fatigue.   Cardiovascular:  Positive for syncope.   Gastrointestinal:  Positive for diarrhea.   All other systems reviewed and are negative.  Objective:     Vital Signs (Most Recent):  Temp: 98 °F (36.7 °C) (09/29/22 0750)  Pulse: 87 (09/29/22 0750)  Resp: 20 (09/29/22 0750)  BP: (!) 103/52 (09/29/22 0750)  SpO2: 100 % (09/29/22 0733)   Vital Signs (24h Range):  Temp:  [97.6 °F (36.4 °C)-98 °F (36.7 °C)] 98 °F (36.7 °C)  Pulse:  [63-87] 87  Resp:  [18-20] 20  SpO2:  [98 %-100 %] 100 %  BP: (103-159)/(52-70) 103/52     Weight: 76.7 kg (169 lb)  Body mass index is 27.28 kg/m².    SpO2: 100 %  O2 Device (Oxygen Therapy): room air    No intake or output data in the 24 hours ending 09/29/22 0852    Lines/Drains/Airways       Peripheral Intravenous Line  Duration                  Peripheral IV - Single Lumen 09/29/22 0605 20 G Left Antecubital <1 day                    Physical Exam  Vitals and nursing note reviewed.   Constitutional:       Appearance: Normal appearance.   HENT:      Head: Normocephalic and atraumatic.      Right Ear: External ear normal.      Left Ear: External ear normal.      Nose: Nose normal.      Mouth/Throat:      Mouth: Mucous membranes are moist.   Eyes:      Pupils: Pupils are equal, round, and reactive to light.   Neck:      Comments: No jvd   Cardiovascular:      Rate and Rhythm: Normal rate and regular rhythm.      Pulses: Normal pulses.      Comments: Mechanical click   Pulmonary:      Effort: Pulmonary effort is normal.   Abdominal:      General: Abdomen is flat.   Musculoskeletal:         General: Normal range of motion.      Cervical back: Normal range of motion.      Right lower leg: No edema.      Left lower leg: No edema.   Skin:     General: Skin is warm and dry.      Capillary Refill:  Capillary refill takes less than 2 seconds.   Neurological:      General: No focal deficit present.      Mental Status: He is alert and oriented to person, place, and time.       Significant Labs: All pertinent lab results from the last 24 hours have been reviewed.    Significant Imaging:  reviewed    Assessment and Plan:     * Syncope  Suspect provoked by volume depletion given labs and recent diarrheal illness. ECG similar to prior ecg with LBBB and 1st degree AV block but no signs of 2nd or 3rd degree AV block on ECG.     Plan:   -admit to medicine  -defer workup of diarrheal illness and volume repletion to medicine colleagues  -obtain echo, please call for any changes     Hypertension associated with diabetes  -pt orthostatic   -fluid repletion and monitor BP    H/O mechanical aortic valve replacement  -repeat echo     Type 2 diabetes mellitus with stage 3 chronic kidney disease, without long-term current use of insulin  -blood sugar control per primary  -monitor renal function     Hyperlipidemia associated with type 2 diabetes mellitus  -statin  -blood sugar control     Long term current use of anticoagulant therapy  -daily INR with goal INR 2-3 with mechanical AV    Coronary artery disease of bypass graft of native heart with stable angina pectoris  -continue coumadin, plavix, bp control and statin         VTE Risk Mitigation (From admission, onward)         Ordered     IP VTE HIGH RISK PATIENT  Once         09/29/22 0856     Place sequential compression device  Until discontinued         09/29/22 0856     Place NATI hose  Until discontinued         09/29/22 0856     Reason for No Pharmacological VTE Prophylaxis  Once        Question:  Reasons:  Answer:  Already adequately anticoagulated on oral Anticoagulants    09/29/22 0856                Thank you for your consult. I will sign off. Please contact us if you have any additional questions.    Raudel Neal MD  Interventional Cardiology   [facility] - Emergency  Dept

## 2022-09-29 NOTE — ASSESSMENT & PLAN NOTE
Suspect provoked by volume depletion given labs and recent diarrheal illness. ECG similar to prior ecg with LBBB and 1st degree AV block but no signs of 2nd or 3rd degree AV block on ECG.     Plan:   -admit to medicine  -defer workup of diarrheal illness and volume repletion to medicine colleagues  -obtain echo, please call for any changes

## 2022-09-29 NOTE — HPI
Dariel Urbina is a 81 yo M with coronary disease status post CABG, mechanical valve in aortic position, hypertension, hyperlipidemia, chronic kidney disease stage 4, chronic anticoagulation with Coumadin, peripheral arterial disease with worsening claudication to approaching 3, epistaxis, and congestive heart failure with ejection fraction 45% + grade 2 diastolic dysfunction who presents to the ED with syncope. He states over the past 2-3 days he has had syncope in the morning after getting up and ambulating. He states prior to his syncope he begins to feel light-headed and flushed and knows he is about to pass out. Denies preceding palpitations or angina. Additionally, for the past 3 days he has had severe diarrhea and feels weak and fatigued. Denies angina or any other focal complaints.     In the ED, his initial vital signs were stable and head CT did not demonstrate signs of ICH. ECG obtained demonstrated LBBB similar to prior and PACs/PVCs and 1st degree AV block. Labs significant for  (BL in the 60s) with cr of 3 which is near recent baseline,  which is near his baseline, INR 2.6 and otherwise unrevealing. Orthostatic vitals significant for drop in SBP from 150s to 107 from lying to standing. Cardiology then called due to concern for heart block causing syncope.

## 2022-09-30 LAB
AFP SERPL-MCNC: <2 NG/ML (ref 0–8.4)
ANION GAP SERPL CALC-SCNC: 8 MMOL/L (ref 8–16)
BASOPHILS # BLD AUTO: 0.02 K/UL (ref 0–0.2)
BASOPHILS NFR BLD: 0.4 % (ref 0–1.9)
BUN SERPL-MCNC: 78 MG/DL (ref 8–23)
C DIFF GDH STL QL: NEGATIVE
C DIFF TOX A+B STL QL IA: NEGATIVE
CALCIUM SERPL-MCNC: 9.8 MG/DL (ref 8.7–10.5)
CEA SERPL-MCNC: <1.7 NG/ML (ref 0–5)
CHLORIDE SERPL-SCNC: 111 MMOL/L (ref 95–110)
CO2 SERPL-SCNC: 20 MMOL/L (ref 23–29)
CREAT SERPL-MCNC: 2.3 MG/DL (ref 0.5–1.4)
DIFFERENTIAL METHOD: ABNORMAL
EOSINOPHIL # BLD AUTO: 0.1 K/UL (ref 0–0.5)
EOSINOPHIL NFR BLD: 2.1 % (ref 0–8)
ERYTHROCYTE [DISTWIDTH] IN BLOOD BY AUTOMATED COUNT: 14.4 % (ref 11.5–14.5)
EST. GFR  (NO RACE VARIABLE): 27.8 ML/MIN/1.73 M^2
GLUCOSE SERPL-MCNC: 62 MG/DL (ref 70–110)
HCT VFR BLD AUTO: 29.6 % (ref 40–54)
HGB BLD-MCNC: 9.9 G/DL (ref 14–18)
IMM GRANULOCYTES # BLD AUTO: 0.01 K/UL (ref 0–0.04)
IMM GRANULOCYTES NFR BLD AUTO: 0.2 % (ref 0–0.5)
INR PPP: 2.5 (ref 0.8–1.2)
LYMPHOCYTES # BLD AUTO: 1.4 K/UL (ref 1–4.8)
LYMPHOCYTES NFR BLD: 28.1 % (ref 18–48)
MAGNESIUM SERPL-MCNC: 1.6 MG/DL (ref 1.6–2.6)
MCH RBC QN AUTO: 30.4 PG (ref 27–31)
MCHC RBC AUTO-ENTMCNC: 33.4 G/DL (ref 32–36)
MCV RBC AUTO: 91 FL (ref 82–98)
MONOCYTES # BLD AUTO: 0.6 K/UL (ref 0.3–1)
MONOCYTES NFR BLD: 11.7 % (ref 4–15)
NEUTROPHILS # BLD AUTO: 2.9 K/UL (ref 1.8–7.7)
NEUTROPHILS NFR BLD: 57.5 % (ref 38–73)
NRBC BLD-RTO: 0 /100 WBC
PHOSPHATE SERPL-MCNC: 5 MG/DL (ref 2.7–4.5)
PLATELET # BLD AUTO: 137 K/UL (ref 150–450)
PMV BLD AUTO: 10.7 FL (ref 9.2–12.9)
POCT GLUCOSE: 71 MG/DL (ref 70–110)
POCT GLUCOSE: 79 MG/DL (ref 70–110)
POTASSIUM SERPL-SCNC: 4.9 MMOL/L (ref 3.5–5.1)
PROTHROMBIN TIME: 24.7 SEC (ref 9–12.5)
RBC # BLD AUTO: 3.26 M/UL (ref 4.6–6.2)
SODIUM SERPL-SCNC: 139 MMOL/L (ref 136–145)
TROPONIN I SERPL DL<=0.01 NG/ML-MCNC: 0.07 NG/ML (ref 0–0.03)
TROPONIN I SERPL DL<=0.01 NG/ML-MCNC: 0.09 NG/ML (ref 0–0.03)
WBC # BLD AUTO: 5.12 K/UL (ref 3.9–12.7)

## 2022-09-30 PROCEDURE — 25000003 PHARM REV CODE 250

## 2022-09-30 PROCEDURE — 82105 ALPHA-FETOPROTEIN SERUM: CPT

## 2022-09-30 PROCEDURE — 25000003 PHARM REV CODE 250: Performed by: EMERGENCY MEDICINE

## 2022-09-30 PROCEDURE — 85025 COMPLETE CBC W/AUTO DIFF WBC: CPT

## 2022-09-30 PROCEDURE — 96361 HYDRATE IV INFUSION ADD-ON: CPT

## 2022-09-30 PROCEDURE — 36415 COLL VENOUS BLD VENIPUNCTURE: CPT

## 2022-09-30 PROCEDURE — 84484 ASSAY OF TROPONIN QUANT: CPT | Mod: 91

## 2022-09-30 PROCEDURE — G0378 HOSPITAL OBSERVATION PER HR: HCPCS

## 2022-09-30 PROCEDURE — 82962 GLUCOSE BLOOD TEST: CPT

## 2022-09-30 PROCEDURE — 83735 ASSAY OF MAGNESIUM: CPT

## 2022-09-30 PROCEDURE — 89055 LEUKOCYTE ASSESSMENT FECAL: CPT

## 2022-09-30 PROCEDURE — 85610 PROTHROMBIN TIME: CPT

## 2022-09-30 PROCEDURE — 87449 NOS EACH ORGANISM AG IA: CPT

## 2022-09-30 PROCEDURE — 99226 PR SUBSEQUENT OBSERVATION CARE,LEVEL III: ICD-10-PCS | Mod: ,,,

## 2022-09-30 PROCEDURE — 82378 CARCINOEMBRYONIC ANTIGEN: CPT

## 2022-09-30 PROCEDURE — 84100 ASSAY OF PHOSPHORUS: CPT

## 2022-09-30 PROCEDURE — 84484 ASSAY OF TROPONIN QUANT: CPT

## 2022-09-30 PROCEDURE — 80048 BASIC METABOLIC PNL TOTAL CA: CPT

## 2022-09-30 PROCEDURE — 83993 ASSAY FOR CALPROTECTIN FECAL: CPT

## 2022-09-30 PROCEDURE — 99226 PR SUBSEQUENT OBSERVATION CARE,LEVEL III: CPT | Mod: ,,,

## 2022-09-30 RX ORDER — BISMUTH SUBSALICYLATE 525 MG/30ML
30 LIQUID ORAL ONCE AS NEEDED
Status: ON HOLD | COMMUNITY
End: 2022-10-24

## 2022-09-30 RX ORDER — SODIUM CHLORIDE 9 MG/ML
INJECTION, SOLUTION INTRAVENOUS CONTINUOUS
Status: ACTIVE | OUTPATIENT
Start: 2022-09-30 | End: 2022-10-01

## 2022-09-30 RX ADMIN — SODIUM CHLORIDE 500 ML: 0.9 INJECTION, SOLUTION INTRAVENOUS at 12:09

## 2022-09-30 RX ADMIN — OMEGA-3-ACID ETHYL ESTERS 2 G: 1 CAPSULE, LIQUID FILLED ORAL at 08:09

## 2022-09-30 RX ADMIN — NIFEDIPINE 60 MG: 30 TABLET, FILM COATED, EXTENDED RELEASE ORAL at 08:09

## 2022-09-30 RX ADMIN — LISINOPRIL 2.5 MG: 2.5 TABLET ORAL at 08:09

## 2022-09-30 RX ADMIN — WARFARIN SODIUM 5 MG: 5 TABLET ORAL at 04:09

## 2022-09-30 RX ADMIN — CARVEDILOL 12.5 MG: 12.5 TABLET, FILM COATED ORAL at 08:09

## 2022-09-30 RX ADMIN — Medication 6 MG: at 12:09

## 2022-09-30 RX ADMIN — SODIUM CHLORIDE: 0.9 INJECTION, SOLUTION INTRAVENOUS at 09:09

## 2022-09-30 RX ADMIN — OMEGA-3-ACID ETHYL ESTERS 2 G: 1 CAPSULE, LIQUID FILLED ORAL at 09:09

## 2022-09-30 RX ADMIN — ALLOPURINOL 100 MG: 100 TABLET ORAL at 08:09

## 2022-09-30 RX ADMIN — ATORVASTATIN CALCIUM 80 MG: 40 TABLET, FILM COATED ORAL at 08:09

## 2022-09-30 RX ADMIN — CARVEDILOL 12.5 MG: 12.5 TABLET, FILM COATED ORAL at 09:09

## 2022-09-30 NOTE — ASSESSMENT & PLAN NOTE
Patient's FSGs are controlled on current medication regimen.  Last A1c reviewed-   Lab Results   Component Value Date    HGBA1C 5.7 (H) 02/10/2022     Most recent fingerstick glucose reviewed-   Recent Labs   Lab 09/29/22 2122 09/30/22  0005 09/30/22  0729   POCTGLUCOSE 73 79 71     Current correctional scale  Low  Maintain anti-hyperglycemic dose as follows-   Antihyperglycemics (From admission, onward)    None        Hold Oral hypoglycemics while patient is in the hospital.

## 2022-09-30 NOTE — ASSESSMENT & PLAN NOTE
- , Cr 3.0 on 9/29  - IV fluids given --> BUN 78, Cr 2.3 on 9/30  - Monitor renal function  - Avoid nephrotoxic agents

## 2022-09-30 NOTE — HOSPITAL COURSE
Patient admitted to hospital medicine observation unit with chief complaint of syncope and intermittent diarrhea x 6 weeks. Had unwitnessed fall at home in bathroom, suffered laceration to L forehead and L arm. CTH and CT Cervical spine negative for acute abnormalities. Interventional cardiology consulted due to concern for heart block. EKG showed LBBB w/ 1st degree AV block, no signs of 2nd or 3rd degree block. IC recommended echo, BP control and volume repletion 2/2 diarrhea. Echo showed EF 50% and Grade 1 LV DD - slight improvement from previous echo in 2020. Orthostatics positive, IVF repletion as indicated. CT A/P ordered for persistent diarrhea - no acute findings. Endorsed substantial weight loss, possibly 2/2 poor PO intake and decreased appetite. CEA/AFP negative. Stool culture, c diff, and WBC pending. Started gluten free/lactose free diet. Telemetry noted possible arrhythmia, EKG obtained - had undetermined rhythm so a repeat EKG was ordered. Repeat EKG showed no changes from his EKG at time of admission. Patient stable for discharge. To f/u with PCP and GI outpatient.

## 2022-09-30 NOTE — PROGRESS NOTES
PharmD Consult received for:  1.) Admit medication history/reconciliation:    Completed, please see pharmacy medication reconciliation note completed by Ana Miranda on 9/30.     2.) Renally adjust all medications:    Estimated Creatinine Clearance: 25.4 mL/min (A) (based on SCr of 2.3 mg/dL (H)).   Medications reviewed, no dose adjustments needed     3.) Warfarin dosing:     PHARMACY CONSULT NOTE: WARFARIN  Dariel Urbina is a 82 y.o. male on warfarin therapy for Mechanical Heart Valve. PharmD has been consulted for warfarin dosing.    Current order: warfarin 7.5 mg Sun, Thurs; 5 mg all other days  Home dose: warfarin 7.5 mg Sun, Thurs; 5 mg all other days  Coumadin clinic enrollment: Active  INR goal: 2-3    Lab Results   Component Value Date    INR 2.5 (H) 09/30/2022    INR 2.6 (H) 09/29/2022    INR 2.6 (H) 09/06/2022     Significant drug interactions:  Diet: Adult Diabetic without supplements     Recommendation(s):   Continue home regimen of warfarin 7.5 mg Sun, Thurs; 5 mg all other days  Pharmacy will continue to follow and monitor warfarin    Thank you for the consult,  January Linda PharmD, [facility]  Internal Medicine Clinical Pharmacy Specialist   Ext [phone]     **Note: Consults are reviewed Monday-Friday 7:00am-3:30pm. THE ABOVE RECOMMENDATIONS ARE ONLY SUGGESTED.THE RECOMMENDATIONS SHOULD BE CONSIDERED IN CONJUNCTION WITH ALL PATIENT FACTORS. **

## 2022-09-30 NOTE — ASSESSMENT & PLAN NOTE
- Patient not taking plavix, did not order  - Continue coumadin, BP control and statin  - Troponin trended: 0.055 --> 0.093 --> 0.07  - Monitor for any changes in symptoms

## 2022-09-30 NOTE — ASSESSMENT & PLAN NOTE
- Loperamide 2 mg PRN discontinued  - CT abdomen/pelvis complete - no acute findings  - Stool culture and C.diff culture pending  - AFP and CEA negative   - Gluten and lactose free diet ordered

## 2022-09-30 NOTE — PHARMACY MED REC
"Admission Medication History     The home medication history was taken by Selvin Miranda.    You may go to "Admission" then "Reconcile Home Medications" tabs to review and/or act upon these items.     The home medication list has been updated by the Pharmacy department.   Please read ALL comments highlighted in yellow.   Please address this information as you see fit.    Feel free to contact us if you have any questions or require assistance.      Medications listed below were obtained from: Ameena - spouse    PTA Medications   Medication Sig    allopurinoL (ZYLOPRIM) 100 MG tablet   TAKE 1 TABLET EVERY DAY    azelastine (ASTELIN) 137 mcg (0.1 %) nasal spray   1 spray by Nasal route 2 (two) times daily as needed for Rhinitis.    bismuth subsalicylate (PEPTO-BISMOL) 262 mg/15 mL suspension   Take 30 mLs by mouth once as needed (Diarrhea).    bumetanide (BUMEX) 2 MG tablet   Take 1 tablet (2 mg total) by mouth 2 (two) times daily.    carvediloL (COREG) 12.5 MG tablet   Take 1 tablet (12.5 mg total) by mouth 2 (two) times daily.    clopidogreL (PLAVIX) 75 mg tablet   Take 1 tablet (75 mg total) by mouth once daily.    glimepiride (AMARYL) 1 MG tablet   TAKE 1 TABLET (1 MG TOTAL) EVERY MORNING.    isosorbide mononitrate (IMDUR) 120 MG 24 hr tablet   Take 1 tablet (120 mg total) by mouth once daily.    lactose-reduced food (ENSURE ORAL)   Drink 1-2 Cans by mouth once daily.    lisinopriL (PRINIVIL,ZESTRIL) 2.5 MG tablet   TAKE 1 TABLET (2.5 MG TOTAL) BY MOUTH ONCE DAILY.    loperamide (IMODIUM A-D) 2 mg Tab   Take 2 capsules at onset then 1 capsule as needed for diarrhea.    NIFEdipine (PROCARDIA-XL) 60 MG (OSM) 24 hr tablet   Take 1 tablet (60 mg total) by mouth once daily.    omega-3 acid ethyl esters (LOVAZA) 1 gram capsule   Take 1 g by mouth once daily.    rosuvastatin (CRESTOR) 40 MG Tab   Take 40 mg by mouth every evening.    warfarin (COUMADIN) 5 MG tablet Take 1 tablet (5 mg) by mouth Monday, Tuesday, Wednesday, " Friday & Saturday then 1.5 tablets (7.5mg) by mouth all other days (Thurs & Sun) as instructed by Coumadin Clinic.      nitroGLYCERIN (NITROSTAT) 0.4 MG SL tablet Place 1 tablet (0.4 mg total) under the tongue every 5 (five) minutes as needed. As needed for chest pain         Selvin Miranda CPhT  EXT [phone]                    .

## 2022-09-30 NOTE — ASSESSMENT & PLAN NOTE
Patient experienced light-headedness and dizziness for the last 3 days. Today morning, patient experienced first episode of syncope w/ LOC and had an unwitnessed fall in the bathroom. Sustained a laceration to left arm and left forehead. Patient endorses on and off diarrhea for 6 weeks. Suspect syncope is provoked by volume depletion. Denies any chest pain or dyspnea. Admitted to hospital medicine for syncope work-up.     - Cardiology consulted due to concern for heart block    - EKG shows LBBB w/ 1st degree AV block. No signs of 2nd or 3rd degree block   - Recommended fluid repletion, monitor BP and workup diarrheal illness  - Ordered Echo - EF 50%, Grade 1 LV DD   - Orthostatic vitals positive   - IV  ml bolus over 5 hours + maintenance fluids 100 ml/hr x 8 hours given  - Repeat orthostatic vitals remain positive 9/30 despite fluid repletion    - Second bolus IV  mL + maintenance fluids 100 ml/hr x 10 hours ordered

## 2022-09-30 NOTE — SUBJECTIVE & OBJECTIVE
Interval History: NAEON. VSS. CT A/P negative. JIM improved, Cr 2.3, BUN 78. Repeat troponin ordered 0.093 --> 0.074. No complaints of chest pain or dyspnea. Patient states he did not have any light headedness when he woke up this morning. Wife states that his coloration is better and he appears to have improved symptomatically. Repeat orthostatics positive. Continues to have diarrhea this morning, x2 episodes. Diarrhea work-up pending. Continue IVF repletion and hold anti-hypertensives for now given persistent orthostatic hypotension.     Review of Systems   Constitutional:  Positive for appetite change, fatigue and unexpected weight change. Negative for chills and fever.   Respiratory:  Negative for chest tightness and shortness of breath.    Cardiovascular:  Negative for chest pain and leg swelling.   Gastrointestinal:  Positive for diarrhea. Negative for abdominal pain and nausea.   Neurological:  Positive for light-headedness (resolved). Negative for dizziness and weakness.   Objective:     Vital Signs (Most Recent):  Temp: 97.9 °F (36.6 °C) (09/30/22 1118)  Pulse: 76 (09/30/22 1120)  Resp: 20 (09/30/22 1120)  BP: (!) 80/45 (09/30/22 1120)  SpO2: 95 % (09/30/22 1120)   Vital Signs (24h Range):  Temp:  [97.5 °F (36.4 °C)-98.1 °F (36.7 °C)] 97.9 °F (36.6 °C)  Pulse:  [53-76] 76  Resp:  [15-20] 20  SpO2:  [93 %-100 %] 95 %  BP: ()/(45-80) 80/45     Weight: 82.5 kg (181 lb 14.1 oz)  Body mass index is 29.36 kg/m².    Intake/Output Summary (Last 24 hours) at 9/30/2022 1229  Last data filed at 9/30/2022 0723  Gross per 24 hour   Intake --   Output 1300 ml   Net -1300 ml      Physical Exam  Vitals and nursing note reviewed.   Constitutional:       General: He is not in acute distress.     Appearance: He is well-developed.   HENT:      Head: Normocephalic and atraumatic.      Mouth/Throat:      Pharynx: No oropharyngeal exudate.   Eyes:      Conjunctiva/sclera: Conjunctivae normal.      Pupils: Pupils are equal,  round, and reactive to light.   Cardiovascular:      Rate and Rhythm: Normal rate and regular rhythm.      Heart sounds: Normal heart sounds.   Pulmonary:      Effort: Pulmonary effort is normal. No respiratory distress.      Breath sounds: Normal breath sounds. No wheezing.   Abdominal:      General: Bowel sounds are normal. There is no distension.      Palpations: Abdomen is soft.      Tenderness: There is no abdominal tenderness.   Musculoskeletal:         General: No tenderness. Normal range of motion.      Cervical back: Normal range of motion and neck supple.   Lymphadenopathy:      Cervical: No cervical adenopathy.   Skin:     General: Skin is warm and dry.      Capillary Refill: Capillary refill takes less than 2 seconds.      Findings: Lesion (Laceration on L forehead and L arm) present. No rash.   Neurological:      Mental Status: He is alert and oriented to person, place, and time.      Cranial Nerves: No cranial nerve deficit.      Sensory: No sensory deficit.      Coordination: Coordination normal.   Psychiatric:         Behavior: Behavior normal.         Thought Content: Thought content normal.         Judgment: Judgment normal.       Significant Labs: All pertinent labs within the past 24 hours have been reviewed.    BMP:   Recent Labs   Lab 09/30/22  0250   GLU 62*      K 4.9   *   CO2 20*   BUN 78*   CREATININE 2.3*   CALCIUM 9.8   MG 1.6     CBC:   Recent Labs   Lab 09/29/22  0601 09/30/22  0250   WBC 6.06 5.12   HGB 10.8* 9.9*   HCT 33.2* 29.6*    137*     CMP:   Recent Labs   Lab 09/29/22  0601 09/30/22  0250    139   K 4.5 4.9    111*   CO2 20* 20*   GLU 57* 62*   * 78*   CREATININE 3.0* 2.3*   CALCIUM 10.7* 9.8   PROT 8.1  --    ALBUMIN 4.0  --    BILITOT 0.4  --    ALKPHOS 56  --    AST 20  --    ALT 19  --    ANIONGAP 12 8     POCT Glucose:   Recent Labs   Lab 09/29/22 2122 09/30/22  0005 09/30/22  0729   POCTGLUCOSE 73 79 71     Urine Studies:   Recent  Labs   Lab 09/29/22  1057   COLORU Colorless*   APPEARANCEUA Clear   PHUR 6.0   SPECGRAV 1.010   PROTEINUA 1+*   GLUCUA Negative   KETONESU Negative   BILIRUBINUA Negative   OCCULTUA 1+*   NITRITE Negative   LEUKOCYTESUR Negative   RBCUA 1   WBCUA 1   BACTERIA Occasional   HYALINECASTS 0     Significant Imaging: I have reviewed all pertinent imaging results/findings within the past 24 hours.

## 2022-09-30 NOTE — PLAN OF CARE
Abdulkadir Duval - Telemetry Stepdown ([facility])  Discharge Assessment    Primary Care Provider: Devon Langston MD     Discharge Assessment (most recent)       BRIEF DISCHARGE ASSESSMENT - 09/30/22 1153          Discharge Planning    Assessment Type Discharge Planning Brief Assessment (P)      Resource/Environmental Concerns none (P)      Support Systems Spouse/significant other (P)      Equipment Currently Used at Home none (P)      Current Living Arrangements home/apartment/condo (P)      Patient/Family Anticipates Transition to home (P)      Patient/Family Anticipated Services at Transition none (P)      DME Needed Upon Discharge  none (P)      Discharge Plan A Home (P)         Financial Resource Strain    How hard is it for you to pay for the very basics like food, housing, medical care, and heating? Not hard at all (P)         Housing Stability    In the last 12 months, was there a time when you were not able to pay the mortgage or rent on time? No (P)      In the last 12 months, was there a time when you did not have a steady place to sleep or slept in a shelter (including now)? No (P)         Transportation Needs    In the past 12 months, has lack of transportation kept you from medical appointments or from getting medications? No (P)      In the past 12 months, has lack of transportation kept you from meetings, work, or from getting things needed for daily living? No (P)         Food Insecurity    Within the past 12 months, you worried that your food would run out before you got the money to buy more. Never true (P)      Within the past 12 months, the food you bought just didn't last and you didn't have money to get more. Never true (P)         Social Connections    Are you , , , , never , or living with a partner?  (P)                    Spoke with pt's wife. Pt is IADLs and mobility. Pt does not have any DME or utilize any in home services. Pt plans to return home  at discharge and wife will provide transportation. CM will continue to follow for any discharge needs.    Zehra Ramirez RN Case Manager  [phone]

## 2022-09-30 NOTE — ASSESSMENT & PLAN NOTE
- On home coreg 12.5 mg BID, lisinopril 2.5 mg daily, home nifedipine 60 mg daily  - Patient is orthostatic  --> HOLD all anti-hypertensives for now; resume when appropriate  - Fluid repletion and monitor BP with q4h vitals

## 2022-09-30 NOTE — H&P
[facility] - Telemetry [facility] ([address] Medicine  History & Physical    Patient Name: Dariel Urbina  MRN: [id number]  Patient Class: OP- Observation  Admission Date: 9/29/2022  Attending Physician: Manny Shine MD   Primary Care Provider: Devon Langston MD         Patient information was obtained from patient, past medical records and ER records.     Subjective:     Principal Problem:Syncope    Chief Complaint:   Chief Complaint   Patient presents with    Fall     Pt fell around 4am this morning and hit L side of head and L forearm. Skin tear present to L forearm and lac + bruise present to L forehead. Denies loss of consciousness. + blood thinners. Pt A&Ox4 in triage.         HPI: Dariel Urbina is a 82 y.o. male with PMHx of CAD s/p CABG, mechanical aortic valve, on chronic anticoagulation with Coumadin, CHF, HLD, HTN, T2DM and CKD presents to the ED with syncope. Patient states he has been feeling light headed when he wakes up for the past 3 mornings. Today morning, he woke up, went to use the restroom and had an unwitnessed syncopal episode w/ LOC. He hit his head on an unknown surface in the bathroom, sustained a laceration to his left forehead and left arm. He felt light headed and dizzy prior to the syncopal episode. The patient has also been having multiple episodes of diarrhea on and off for 6 weeks. States the diarrhea is very watery and brown in color, denies any hematochezia. Of note, patient states his appetite has decreased over the last 2-3 months, specifically since his 82st birthday. He states in drinks a lot of water still, but his food intake is minimal. Endorses recent weight loss, which he believes is due to his poor appetite. Denies any chest pain, dyspnea, F/N, recent sick contacts, recent antibiotic use or any abdominal pain.     ED: /103. CBC wnl. INR 2.6. CMP: , Cr 3.0, Glucose 57. . Troponin 0.055. UA wnl. CXR:    No acute intrathoracic  abnormality.  No detrimental change from prior. CT Head and Cervical Spine without contrast: No acute intracranial abnormality, Multilevel degenerative changes of the cervical spine. Echo ordered.       Past Medical History:   Diagnosis Date    Anemia of chronic renal failure, stage 3 (moderate) 05/27/2015    Anticoagulant long-term use     Atherosclerosis of coronary artery bypass graft of native heart without angina pectoris 09/11/2012    3-27-18 [facility] Two vessel coronary artery disease.   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    Bilateral carotid artery disease 02/09/2017    Bleeding from the nose     Bleeding nose 03/21/2018    Cataract     CKD (chronic kidney disease) stage 3, GFR 30-59 ml/min 05/27/2015    Claudication of left lower extremity 09/17/2014    Colon polyp     Encounter for blood transfusion     Gastroesophageal reflux disease without esophagitis 03/19/2018    Gastrointestinal hemorrhage associated with intestinal diverticulosis 04/01/2018    Glaucoma     H/O mechanical aortic valve replacement 09/17/2014    History of gout 09/26/2012    Hyperparathyroidism due to renal insufficiency 07/27/2015    Internal hemorrhoid 04/03/2018    Long term current use of anticoagulant therapy 09/26/2012    Mechanical heart valve present     Metabolic acidosis with normal anion gap and bicarbonate losses 03/20/2018    Mixed hyperlipidemia 09/26/2012    NSTEMI (non-ST elevated myocardial infarction) 03/21/2018    Obesity, diabetes, and hypertension syndrome 02/23/2016    PVD (peripheral vascular disease) 09/11/2012    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    Syncope 9/29/2022    Type 2 diabetes mellitus with diabetic peripheral angiopathy without gangrene 05/27/2015    Type 2 diabetes mellitus with stage 3 chronic kidney disease, without long-term current use of insulin 10/02/2013       Past Surgical History:   Procedure Laterality Date    CARDIAC CATHETERIZATION       CARDIAC VALVE REPLACEMENT      CARDIAC VALVE SURGERY      CARPAL TUNNEL RELEASE Right 5/19/2020    Procedure: RELEASE, CARPAL TUNNEL;  Surgeon: Rupesh Norris Jr., MD;  Location: [facility];  Service: Plastics;  Laterality: Right;    COLON SURGERY      COLONOSCOPY N/A 3/31/2017    Procedure: COLONOSCOPY;  Surgeon: Bruno Raymond MD;  Location: [facility] ENDO (4TH FLR);  Service: Endoscopy;  Laterality: N/A;  Patient's wife requesting date.    COLONOSCOPY N/A 4/3/2018    Procedure: COLONOSCOPY;  Surgeon: Bonifacio Pelletier MD;  Location: [facility] ENDO (2ND FLR);  Service: Endoscopy;  Laterality: N/A;    COLONOSCOPY N/A 8/13/2018    Procedure: COLONOSCOPY;  Surgeon: Kam Barba MD;  Location: [facility] ENDO (2ND FLR);  Service: Endoscopy;  Laterality: N/A;  2nd floor: PA pressure 49; hx of moderate-severe valve disease     per Coumadin clinic-Patient can hold 5 days with lovenox bridge       ok to schedule per Katarina    CORONARY ANGIOGRAPHY N/A 10/4/2021    Procedure: Left heart cath +/- peripheral angiogram;  Surgeon: Jose Ruiz MD;  Location: [facility] CATH LAB;  Service: Cardiology;  Laterality: N/A;    CORONARY ANGIOPLASTY      CORONARY ARTERY BYPASS GRAFT      CORONARY BYPASS GRAFT ANGIOGRAPHY  10/4/2021    Procedure: Bypass graft study;  Surgeon: Jose Ruiz MD;  Location: [facility] CATH LAB;  Service: Cardiology;;    HERNIA REPAIR      SPINE SURGERY      VASECTOMY         Review of patient's allergies indicates:   Allergen Reactions    Fosinopril      Intolerance- elevates potassium level      Losartan      Intolerance- elevates potassium level       No current facility-administered medications on file prior to encounter.     Current Outpatient Medications on File Prior to Encounter   Medication Sig    allopurinoL (ZYLOPRIM) 100 MG tablet TAKE 1 TABLET EVERY DAY    bumetanide (BUMEX) 2 MG tablet Take 1 tablet (2 mg total) by mouth 2 (two) times daily.    carvediloL (COREG) 12.5 MG tablet Take 1  tablet (12.5 mg total) by mouth 2 (two) times daily.    glimepiride (AMARYL) 1 MG tablet TAKE 1 TABLET (1 MG TOTAL) EVERY MORNING.    isosorbide mononitrate (IMDUR) 120 MG 24 hr tablet Take 1 tablet (120 mg total) by mouth once daily.    lisinopriL (PRINIVIL,ZESTRIL) 2.5 MG tablet TAKE 1 TABLET (2.5 MG TOTAL) BY MOUTH ONCE DAILY.    loperamide (IMODIUM A-D) 2 mg Tab Take 2 capsules at onset then 1 capsule as needed for diarrhea.    NIFEdipine (PROCARDIA-XL) 60 MG (OSM) 24 hr tablet Take 1 tablet (60 mg total) by mouth once daily.    omega-3 acid ethyl esters (LOVAZA) 1 gram capsule Take 2 capsules (2 g total) by mouth 2 (two) times daily. (Patient taking differently: Take 1 g by mouth 2 (two) times daily.)    rosuvastatin (CRESTOR) 40 MG Tab TAKE 1 TABLET EVERY EVENING.    warfarin (COUMADIN) 5 MG tablet Take 2 tablets (10mg) by mouth Sunday, then 1.5 tablets (7.5mg) by mouth all other days as instructed by [facility] Clinic. (Patient taking differently: Take 1 tablet (5 mg) by mouth Monday, Wednesday & Friday then 1.5 tablets (7.5mg) by mouth all other days (Tues, Thurs, Sat & Sun) as instructed by [facility]madin Clinic.)    azelastine (ASTELIN) 137 mcg (0.1 %) nasal spray 1 spray by Nasal route 2 (two) times daily.    clopidogreL (PLAVIX) 75 mg tablet Take 1 tablet (75 mg total) by mouth once daily.    nitroGLYCERIN (NITROSTAT) 0.4 MG SL tablet Place 1 tablet (0.4 mg total) under the tongue every 5 (five) minutes as needed. As needed for chest pain    [DISCONTINUED] oxymetazoline HCl (AFRIN, OXYMETAZOLINE, NASL) 1 spray by Each Nostril route once as needed (congestion).     Family History       Problem Relation (Age of Onset)    Alcohol abuse Father    Diabetes Brother    Heart disease Mother, Father, Brother, Sister    Heart failure Mother, Father, Brother    No Known Problems Sister, Maternal Grandmother, Maternal Grandfather, Paternal Grandmother, Paternal Grandfather, Maternal Aunt, Maternal Uncle,  Paternal Aunt, Paternal Uncle          Tobacco Use    Smoking status: Former     Packs/day: 1.00     Years: 20.00     Pack years: 20.00     Types: Cigarettes     Quit date: 1980     Years since quittin.0    Smokeless tobacco: Never   Substance and Sexual Activity    Alcohol use: No    Drug use: No    Sexual activity: Not Currently     Review of Systems   Constitutional:  Positive for appetite change, fatigue and unexpected weight change (2/2 appetite change). Negative for activity change, chills and fever.   HENT:  Negative for facial swelling and trouble swallowing.    Eyes:  Negative for photophobia and visual disturbance.   Respiratory:  Negative for chest tightness, shortness of breath and wheezing.    Cardiovascular:  Negative for chest pain, palpitations and leg swelling.   Gastrointestinal:  Positive for diarrhea. Negative for abdominal pain, constipation, nausea and vomiting.   Genitourinary:  Negative for dysuria, frequency, hematuria and urgency.   Musculoskeletal:  Negative for arthralgias, back pain and gait problem.   Skin:  Negative for color change and rash.   Neurological:  Positive for dizziness, syncope and light-headedness. Negative for weakness, numbness and headaches.   Psychiatric/Behavioral:  Negative for agitation and confusion. The patient is not nervous/anxious.    Objective:     Vital Signs (Most Recent):  Temp: 98.1 °F (36.7 °C) (22 1300)  Pulse: 63 (22 1300)  Resp: 18 (22 1300)  BP: (!) 143/63 (22 1300)  SpO2: 95 % (22 1300)   Vital Signs (24h Range):  Temp:  [97.6 °F (36.4 °C)-98.1 °F (36.7 °C)] 98.1 °F (36.7 °C)  Pulse:  [60-87] 63  Resp:  [16-20] 18  SpO2:  [95 %-100 %] 95 %  BP: (103-167)/(52-89) 143/63     Weight: 82.5 kg (181 lb 14.1 oz)  Body mass index is 29.36 kg/m².    Physical Exam  Vitals and nursing note reviewed.   Constitutional:       General: He is not in acute distress.     Appearance: He is well-developed.   HENT:      Head:  Normocephalic and atraumatic.      Mouth/Throat:      Pharynx: No oropharyngeal exudate.   Eyes:      Conjunctiva/sclera: Conjunctivae normal.      Pupils: Pupils are equal, round, and reactive to light.   Cardiovascular:      Rate and Rhythm: Normal rate and regular rhythm.      Heart sounds: Normal heart sounds.   Pulmonary:      Effort: Pulmonary effort is normal. No respiratory distress.      Breath sounds: Normal breath sounds. No wheezing.   Abdominal:      General: Bowel sounds are normal. There is no distension.      Palpations: Abdomen is soft.      Tenderness: There is no abdominal tenderness.   Musculoskeletal:         General: No tenderness. Normal range of motion.      Cervical back: Normal range of motion and neck supple.   Lymphadenopathy:      Cervical: No cervical adenopathy.   Skin:     General: Skin is warm and dry.      Capillary Refill: Capillary refill takes less than 2 seconds.      Findings: Lesion (Laceration on L forehead and L arm) present. No rash.   Neurological:      Mental Status: He is alert and oriented to person, place, and time.      Cranial Nerves: No cranial nerve deficit.      Sensory: No sensory deficit.      Coordination: Coordination normal.   Psychiatric:         Behavior: Behavior normal.         Thought Content: Thought content normal.         Judgment: Judgment normal.         CRANIAL NERVES     CN III, IV, VI   Pupils are equal, round, and reactive to light.     Significant Labs: All pertinent labs within the past 24 hours have been reviewed.  BMP:   Recent Labs   Lab 09/29/22  0601   GLU 57*      K 4.5      CO2 20*   *   CREATININE 3.0*   CALCIUM 10.7*   MG 1.7     CBC:   Recent Labs   Lab 09/29/22  0601   WBC 6.06   HGB 10.8*   HCT 33.2*        CMP:   Recent Labs   Lab 09/29/22  0601      K 4.5      CO2 20*   GLU 57*   *   CREATININE 3.0*   CALCIUM 10.7*   PROT 8.1   ALBUMIN 4.0   BILITOT 0.4   ALKPHOS 56   AST 20   ALT 19    ANIONGAP 12     Magnesium:   Recent Labs   Lab 09/29/22  0601   MG 1.7     Troponin:   Recent Labs   Lab 09/29/22  0601   TROPONINI 0.055*     Urine Studies:   Recent Labs   Lab 09/29/22  1057   COLORU Colorless*   APPEARANCEUA Clear   PHUR 6.0   SPECGRAV 1.010   PROTEINUA 1+*   GLUCUA Negative   KETONESU Negative   BILIRUBINUA Negative   OCCULTUA 1+*   NITRITE Negative   LEUKOCYTESUR Negative   RBCUA 1   WBCUA 1   BACTERIA Occasional   HYALINECASTS 0       Significant Imaging: I have reviewed all pertinent imaging results/findings within the past 24 hours.  Imaging Results              CT Head Without Contrast (Final result)  Result time 09/29/22 07:17:08      Final result by Loi Ponce MD (09/29/22 07:17:08)                   Impression:      No acute intracranial abnormality.    Mild left frontal scalp soft tissue edema.  No cranial fracture.  No traumatic malalignment or fracture of the spine.    Findings consistent with chronic microvascular ischemic changes, remote lacunar infarcts, and generalized cerebral volume loss.    Multilevel degenerative changes of the cervical spine.    Electronically signed by resident: Viji Carmen  Date:    09/29/2022  Time:    06:52    Electronically signed by: Loi Ponce MD  Date:    09/29/2022  Time:    07:17               Narrative:    EXAMINATION:  CT HEAD WITHOUT CONTRAST; CT CERVICAL SPINE WITHOUT CONTRAST    CLINICAL HISTORY:  Syncope, recurrent;fall on coumadin, syncope;; Compression fracture, cervical;fall r/o fx;    TECHNIQUE:  Low dose axial CT images obtained throughout the head as well as the cervical spine without intravenous contrast. Sagittal and coronal reconstructions were performed.    COMPARISON:  MRI brain 11/26/2003    FINDINGS:  Head:    Intracranial compartment:    Prominence of the ventricles and sulci compatible with generalized cerebral volume loss.  No hydrocephalus.   No extra-axial blood or fluid collections.    There is  hypoattenuation in the supratentorial white matter, nonspecific but most likely reflecting chronic microvascular ischemic changes.  Punctate hypodensity in left basal ganglia and thalamus, likely remote lacunar infarcts.  Additional small remote lacunar infarct in the left cerebellum.  No major vascular distribution infarct. No acute hemorrhage.  No mass effect or midline shift.    Calcifications of the bilateral cavernous ICAs are noted.    Skull/extracranial contents (limited evaluation): Focal area of soft tissue swelling overlying the left frontal bone.  No underlying fracture.  No fracture. Diffuse paranasal sinus membrane thickening.    Cervical spine:    The predental space is maintained.  Atlantodental degenerative changes are noted.  Chondrocalcinosis of the transverse ligament.    Vertebral height loss at C4 and C5.  Osseous fusion noted at C6-7.  Loss of cervical lordosis with grade 1 anterolisthesis of C7 on T1 and T1 on T2.  No acute fracture.  Lucencies noted courses C6-C7 endplate in favored to represent degenerative change.    Multilevel intervertebral disc height loss without osseous fusion at C6-C7.  Multilevel degenerative changes including posterior disc osteophyte complexes, facet arthropathy, and uncovertebral spurring.  Moderate to severe neural foraminal narrowing involving the right neural foramina at C3-C4 and C4-C5.  No high-grade spinal canal stenosis.    Limited evaluation of the intraspinal contents demonstrates no hematoma or mass.Paraspinal soft tissues exhibit no acute abnormalities. No acute abnormality within the imaged portions of the lungs.                                       CT Cervical Spine Without Contrast (Final result)  Result time 09/29/22 07:17:08      Final result by Loi Ponce MD (09/29/22 07:17:08)                   Impression:      No acute intracranial abnormality.    Mild left frontal scalp soft tissue edema.  No cranial fracture.  No traumatic  malalignment or fracture of the spine.    Findings consistent with chronic microvascular ischemic changes, remote lacunar infarcts, and generalized cerebral volume loss.    Multilevel degenerative changes of the cervical spine.    Electronically signed by resident: Viji Carmen  Date:    09/29/2022  Time:    06:52    Electronically signed by: Loi Ponce MD  Date:    09/29/2022  Time:    07:17               Narrative:    EXAMINATION:  CT HEAD WITHOUT CONTRAST; CT CERVICAL SPINE WITHOUT CONTRAST    CLINICAL HISTORY:  Syncope, recurrent;fall on coumadin, syncope;; Compression fracture, cervical;fall r/o fx;    TECHNIQUE:  Low dose axial CT images obtained throughout the head as well as the cervical spine without intravenous contrast. Sagittal and coronal reconstructions were performed.    COMPARISON:  MRI brain 11/26/2003    FINDINGS:  Head:    Intracranial compartment:    Prominence of the ventricles and sulci compatible with generalized cerebral volume loss.  No hydrocephalus.   No extra-axial blood or fluid collections.    There is hypoattenuation in the supratentorial white matter, nonspecific but most likely reflecting chronic microvascular ischemic changes.  Punctate hypodensity in left basal ganglia and thalamus, likely remote lacunar infarcts.  Additional small remote lacunar infarct in the left cerebellum.  No major vascular distribution infarct. No acute hemorrhage.  No mass effect or midline shift.    Calcifications of the bilateral cavernous ICAs are noted.    Skull/extracranial contents (limited evaluation): Focal area of soft tissue swelling overlying the left frontal bone.  No underlying fracture.  No fracture. Diffuse paranasal sinus membrane thickening.    Cervical spine:    The predental space is maintained.  Atlantodental degenerative changes are noted.  Chondrocalcinosis of the transverse ligament.    Vertebral height loss at C4 and C5.  Osseous fusion noted at C6-7.  Loss of cervical  lordosis with grade 1 anterolisthesis of C7 on T1 and T1 on T2.  No acute fracture.  Lucencies noted courses C6-C7 endplate in favored to represent degenerative change.    Multilevel intervertebral disc height loss without osseous fusion at C6-C7.  Multilevel degenerative changes including posterior disc osteophyte complexes, facet arthropathy, and uncovertebral spurring.  Moderate to severe neural foraminal narrowing involving the right neural foramina at C3-C4 and C4-C5.  No high-grade spinal canal stenosis.    Limited evaluation of the intraspinal contents demonstrates no hematoma or mass.Paraspinal soft tissues exhibit no acute abnormalities. No acute abnormality within the imaged portions of the lungs.                                       X-Ray Chest 1 View (Final result)  Result time 09/29/22 06:32:59      Final result by Loi Ponce MD (09/29/22 06:32:59)                   Impression:      No acute intrathoracic abnormality.  No detrimental change from prior.    Electronically signed by resident: Viji Carmen  Date:    09/29/2022  Time:    06:14    Electronically signed by: Loi Ponce MD  Date:    09/29/2022  Time:    06:32               Narrative:    EXAMINATION:  XR CHEST 1 VIEW    CLINICAL HISTORY:  Syncope and collapse    TECHNIQUE:  Single frontal view of the chest was performed.    COMPARISON:  Chest radiograph 03/20/2018    FINDINGS:  Cardiac monitoring wires overlie the chest wall.There is no consolidation, pleural effusion, or pneumothorax.    The cardiomediastinal silhouette is stable.  Calcifications at the aortic arch.  Prosthetic aortic valve is noted.    Sternal wires are intact and aligned.  No acute fracture.                                        Assessment/Plan:     * Syncope  Patient experienced light-headedness and dizziness for the last 3 days. Today morning, patient experienced first episode of syncope w/ LOC and had an unwitnessed fall in the bathroom. Sustained a  laceration to left arm and left forehead. Patient endorses on and off diarrhea for 6 weeks. Suspect syncope is provoked by volume depletion. Denies any chest pain or dyspnea. Admitted to hospital medicine for syncope work-up.     - Ordered Echo  - IV  ml bolus over 5 hours  - Maintenance fluids 100 ml/hr x 8 hours ordered  - Orthostatic vitals significant for drop in SBP from 150s to 107 from lying to standing  - Cardiology consult placed due to concern for heart block  - EKG shows LBBB w/ 1st degree AV block. No signs of 2nd or 3rd degree block    Hypertension associated with diabetes  - Continue home coreg 12.5 mg BID  - Continue lisinopril 2.5 mg daily  - Continue home nifedipine 60 mg daily  - Patient is orthostatic  - Fluid repletion and monitor BP with q4h vitals    Diarrhea  - Loperamide 2 mg PRN  - CT abdomen/pelvis ordered  - Stool culture ordered      Stage 3b chronic kidney disease  - , Cr 3.0  - Monitor renal function  - Avoid nephrotoxic agents    Type 2 diabetes mellitus with stage 3 chronic kidney disease, without long-term current use of insulin  Patient's FSGs are controlled on current medication regimen.  Last A1c reviewed-   Lab Results   Component Value Date    HGBA1C 5.7 (H) 02/10/2022     Most recent fingerstick glucose reviewed-   Recent Labs   Lab 09/29/22  0846 09/29/22  0918   POCTGLUCOSE 58* 94     Current correctional scale  Low  Maintain anti-hyperglycemic dose as follows-   Antihyperglycemics (From admission, onward)    None        Hold Oral hypoglycemics while patient is in the hospital.    History of gout  - Continue allopurinol 100 mg daily    Hyperlipidemia associated with type 2 diabetes mellitus  - Home crestor 40 mg qhs not available on hospital formulary  - Start atorvastatin 80 mg daily    Long term current use of anticoagulant therapy  H/O mechanical aortic valve replacement  - Daily coumadin per home [facility] pharmacy consulted for dosage  - Daily INR with INR  goal 2-3    Coronary artery disease of bypass graft of native heart with stable angina pectoris  - Patient not taking plavix, did not order  - Continue coumadin, BP control and statin      VTE Risk Mitigation (From admission, onward)         Ordered     warfarin (COUMADIN) tablet 5 mg  Once per day on Mon Tue Wed Fri Sat         09/29/22 1415     warfarin (COUMADIN) tablet 7.5 mg  Once per day on Sun Thu         09/29/22 1415     IP VTE HIGH RISK PATIENT  Once         09/29/22 0856     Place sequential compression device  Until discontinued         09/29/22 0856     Place NATI hose  Until discontinued         09/29/22 0856     Reason for No Pharmacological VTE Prophylaxis  Once        Question:  Reasons:  Answer:  Already adequately anticoagulated on oral Anticoagulants    09/29/22 0856                   Lorne Angel PA-C  Department of Hospital Medicine   [facility] - Telemetry Stepdown ([place])

## 2022-09-30 NOTE — PROGRESS NOTES
[facility] - Telemetry Stepdown ([place] [id number])  [facility]  Progress Note    Patient Name: Dariel Urbina  MRN: [id number]  Patient Class: OP- Observation   Admission Date: 9/29/2022  Length of Stay: 0 days  Attending Physician: Manny Shine MD  Primary Care Provider: Devon Langston MD        Subjective:     Principal Problem:Syncope        HPI:  Dariel Urbina is a 82 y.o. male with PMHx of CAD s/p CABG, mechanical aortic valve, on chronic anticoagulation with Coumadin, CHF, HLD, HTN, T2DM and CKD presents to the ED with syncope. Patient states he has been feeling light headed when he wakes up for the past 3 mornings. Today morning, he woke up, went to use the restroom and had an unwitnessed syncopal episode w/ LOC. He hit his head on an unknown surface in the bathroom, sustained a laceration to his left forehead and left arm. He felt light headed and dizzy prior to the syncopal episode. The patient has also been having multiple episodes of diarrhea on and off for 6 weeks. States the diarrhea is very watery and brown in color, denies any hematochezia. Of note, patient states his appetite has decreased over the last 2-3 months, specifically since his 82st birthday. He states in drinks a lot of water still, but his food intake is minimal. Endorses recent weight loss, which he believes is due to his poor appetite. Denies any chest pain, dyspnea, F/N, recent sick contacts, recent antibiotic use or any abdominal pain.     ED: /103. CBC wnl. INR 2.6. CMP: , Cr 3.0, Glucose 57. . Troponin 0.055. UA wnl. CXR:    No acute intrathoracic abnormality.  No detrimental change from prior. CT Head and Cervical Spine without contrast: No acute intracranial abnormality, Multilevel degenerative changes of the cervical spine. Echo ordered.       Overview/Hospital Course:  Patient admitted to hospital medicine observation unit with chief complaint of syncope and intermittent diarrhea x 6 weeks. Had  unwitnessed fall at home in bathroom, suffered laceration to L forehead and L arm. CTH and CT Cervical spine negative for acute abnormalities. Interventional cardiology consulted due to concern for heart block. EKG showed LBBB w/ 1st degree AV block, no signs of 2nd or 3rd degree block. IC recommended echo, BP control and volume repletion 2/2 diarrhea. Echo showed EF 50% and Grade 1 LV DD - slight improvement from previous echo in 2020. Orthostatics positive, IVF repletion as indicated. CT A/P ordered for persistent diarrhea - no acute findings. Endorsed substantial weight loss, possibly 2/2 poor PO intake and decreased appetite. CEA/AFP negative. Stool culture, c diff, and WBC pending. Started gluten free/lactose free diet.       Interval History: NAEON. VSS. CT A/P negative. JIM improved, Cr 2.3, BUN 78. Repeat troponin ordered 0.093 --> 0.074. No complaints of chest pain or dyspnea. Patient states he did not have any light headedness when he woke up this morning. Wife states that his coloration is better and he appears to have improved symptomatically. Repeat orthostatics positive. Continues to have diarrhea this morning, x2 episodes. Diarrhea work-up pending. Continue IVF repletion and hold anti-hypertensives for now given persistent orthostatic hypotension.     Review of Systems   Constitutional:  Positive for appetite change, fatigue and unexpected weight change. Negative for chills and fever.   Respiratory:  Negative for chest tightness and shortness of breath.    Cardiovascular:  Negative for chest pain and leg swelling.   Gastrointestinal:  Positive for diarrhea. Negative for abdominal pain and nausea.   Neurological:  Positive for light-headedness (resolved). Negative for dizziness and weakness.   Objective:     Vital Signs (Most Recent):  Temp: 97.9 °F (36.6 °C) (09/30/22 1118)  Pulse: 76 (09/30/22 1120)  Resp: 20 (09/30/22 1120)  BP: (!) 80/45 (09/30/22 1120)  SpO2: 95 % (09/30/22 1120)   Vital Signs  (24h Range):  Temp:  [97.5 °F (36.4 °C)-98.1 °F (36.7 °C)] 97.9 °F (36.6 °C)  Pulse:  [53-76] 76  Resp:  [15-20] 20  SpO2:  [93 %-100 %] 95 %  BP: ()/(45-80) 80/45     Weight: 82.5 kg (181 lb 14.1 oz)  Body mass index is 29.36 kg/m².    Intake/Output Summary (Last 24 hours) at 9/30/2022 1229  Last data filed at 9/30/2022 0723  Gross per 24 hour   Intake --   Output 1300 ml   Net -1300 ml      Physical Exam  Vitals and nursing note reviewed.   Constitutional:       General: He is not in acute distress.     Appearance: He is well-developed.   HENT:      Head: Normocephalic and atraumatic.      Mouth/Throat:      Pharynx: No oropharyngeal exudate.   Eyes:      Conjunctiva/sclera: Conjunctivae normal.      Pupils: Pupils are equal, round, and reactive to light.   Cardiovascular:      Rate and Rhythm: Normal rate and regular rhythm.      Heart sounds: Normal heart sounds.   Pulmonary:      Effort: Pulmonary effort is normal. No respiratory distress.      Breath sounds: Normal breath sounds. No wheezing.   Abdominal:      General: Bowel sounds are normal. There is no distension.      Palpations: Abdomen is soft.      Tenderness: There is no abdominal tenderness.   Musculoskeletal:         General: No tenderness. Normal range of motion.      Cervical back: Normal range of motion and neck supple.   Lymphadenopathy:      Cervical: No cervical adenopathy.   Skin:     General: Skin is warm and dry.      Capillary Refill: Capillary refill takes less than 2 seconds.      Findings: Lesion (Laceration on L forehead and L arm) present. No rash.   Neurological:      Mental Status: He is alert and oriented to person, place, and time.      Cranial Nerves: No cranial nerve deficit.      Sensory: No sensory deficit.      Coordination: Coordination normal.   Psychiatric:         Behavior: Behavior normal.         Thought Content: Thought content normal.         Judgment: Judgment normal.       Significant Labs: All pertinent labs  within the past 24 hours have been reviewed.    BMP:   Recent Labs   Lab 09/30/22  0250   GLU 62*      K 4.9   *   CO2 20*   BUN 78*   CREATININE 2.3*   CALCIUM 9.8   MG 1.6     CBC:   Recent Labs   Lab 09/29/22  0601 09/30/22  0250   WBC 6.06 5.12   HGB 10.8* 9.9*   HCT 33.2* 29.6*    137*     CMP:   Recent Labs   Lab 09/29/22  0601 09/30/22  0250    139   K 4.5 4.9    111*   CO2 20* 20*   GLU 57* 62*   * 78*   CREATININE 3.0* 2.3*   CALCIUM 10.7* 9.8   PROT 8.1  --    ALBUMIN 4.0  --    BILITOT 0.4  --    ALKPHOS 56  --    AST 20  --    ALT 19  --    ANIONGAP 12 8     POCT Glucose:   Recent Labs   Lab 09/29/22 2122 09/30/22  0005 09/30/22  0729   POCTGLUCOSE 73 79 71     Urine Studies:   Recent Labs   Lab 09/29/22  1057   COLORU Colorless*   APPEARANCEUA Clear   PHUR 6.0   SPECGRAV 1.010   PROTEINUA 1+*   GLUCUA Negative   KETONESU Negative   BILIRUBINUA Negative   OCCULTUA 1+*   NITRITE Negative   LEUKOCYTESUR Negative   RBCUA 1   WBCUA 1   BACTERIA Occasional   HYALINECASTS 0     Significant Imaging: I have reviewed all pertinent imaging results/findings within the past 24 hours.      Assessment/Plan:      * Syncope  Patient experienced light-headedness and dizziness for the last 3 days. Today morning, patient experienced first episode of syncope w/ LOC and had an unwitnessed fall in the bathroom. Sustained a laceration to left arm and left forehead. Patient endorses on and off diarrhea for 6 weeks. Suspect syncope is provoked by volume depletion. Denies any chest pain or dyspnea. Admitted to hospital medicine for syncope work-up.     - Cardiology consulted due to concern for heart block    - EKG shows LBBB w/ 1st degree AV block. No signs of 2nd or 3rd degree block   - Recommended fluid repletion, monitor BP and workup diarrheal illness  - Ordered Echo - EF 50%, Grade 1 LV DD   - Orthostatic vitals positive   - IV  ml bolus over 5 hours + maintenance fluids 100 ml/hr  x 8 hours given  - Repeat orthostatic vitals remain positive 9/30 despite fluid repletion    - Second bolus IV  mL + maintenance fluids 100 ml/hr x 10 hours ordered    Hypertension associated with diabetes  - On home coreg 12.5 mg BID, lisinopril 2.5 mg daily, home nifedipine 60 mg daily  - Patient is orthostatic  --> HOLD all anti-hypertensives for now; resume when appropriate  - Fluid repletion and monitor BP with q4h vitals    Diarrhea  - Loperamide 2 mg PRN discontinued  - CT abdomen/pelvis complete - no acute findings  - Stool culture and C.diff culture pending  - AFP and CEA negative   - Gluten and lactose free diet ordered    Stage 3b chronic kidney disease  - , Cr 3.0 on 9/29  - IV fluids given --> BUN 78, Cr 2.3 on 9/30  - Monitor renal function  - Avoid nephrotoxic agents    Type 2 diabetes mellitus with stage 3 chronic kidney disease, without long-term current use of insulin  Patient's FSGs are controlled on current medication regimen.  Last A1c reviewed-   Lab Results   Component Value Date    HGBA1C 5.7 (H) 02/10/2022     Most recent fingerstick glucose reviewed-   Recent Labs   Lab 09/29/22  2122 09/30/22  0005 09/30/22  0729   POCTGLUCOSE 73 79 71     Current correctional scale  Low  Maintain anti-hyperglycemic dose as follows-   Antihyperglycemics (From admission, onward)    None        Hold Oral hypoglycemics while patient is in the hospital.    History of gout  - Continue allopurinol 100 mg daily    Hyperlipidemia associated with type 2 diabetes mellitus  - Home crestor 40 mg qhs not available on hospital formulary  - Start atorvastatin 80 mg daily    Long term current use of anticoagulant therapy  H/O mechanical aortic valve replacement  - Daily coumadin per home nneka pharmacy consulted for dosage  - Daily INR with INR goal 2-3    Coronary artery disease of bypass graft of native heart with stable angina pectoris  - Patient not taking plavix, did not order  - Continue coumadin, BP  control and statin  - Troponin trended: 0.055 --> 0.093 --> 0.07  - Monitor for any changes in symptoms      VTE Risk Mitigation (From admission, onward)         Ordered     warfarin (COUMADIN) tablet 5 mg  Once per day on Mon Tue Wed Fri Sat         09/29/22 1415     warfarin (COUMADIN) tablet 7.5 mg  Once per day on Sun Thu         09/29/22 1415     IP VTE HIGH RISK PATIENT  Once         09/29/22 0856     Place sequential compression device  Until discontinued         09/29/22 0856     Place NATI hose  Until discontinued         09/29/22 0856     Reason for No Pharmacological VTE Prophylaxis  Once        Question:  Reasons:  Answer:  Already adequately anticoagulated on oral Anticoagulants    09/29/22 0856                Discharge Planning   ASTER: 10/1/2022     Code Status: Full Code   Is the patient medically ready for discharge?: No    Reason for patient still in hospital (select all that apply): Patient trending condition, Laboratory test and Treatment  Discharge Plan A: Home                  Inga Singh PA-C  Department of Hospital Medicine   [facility] - Telemetry Stepdown ([place]-)

## 2022-10-01 VITALS
RESPIRATION RATE: 18 BRPM | TEMPERATURE: 98 F | HEIGHT: 66 IN | OXYGEN SATURATION: 95 % | BODY MASS INDEX: 29.23 KG/M2 | SYSTOLIC BLOOD PRESSURE: 153 MMHG | WEIGHT: 181.88 LBS | DIASTOLIC BLOOD PRESSURE: 67 MMHG | HEART RATE: 73 BPM

## 2022-10-01 LAB
ANION GAP SERPL CALC-SCNC: 9 MMOL/L (ref 8–16)
BASOPHILS # BLD AUTO: 0.03 K/UL (ref 0–0.2)
BASOPHILS NFR BLD: 0.6 % (ref 0–1.9)
BUN SERPL-MCNC: 60 MG/DL (ref 8–23)
CALCIUM SERPL-MCNC: 9.7 MG/DL (ref 8.7–10.5)
CHLORIDE SERPL-SCNC: 112 MMOL/L (ref 95–110)
CO2 SERPL-SCNC: 18 MMOL/L (ref 23–29)
CREAT SERPL-MCNC: 1.9 MG/DL (ref 0.5–1.4)
DIFFERENTIAL METHOD: ABNORMAL
EOSINOPHIL # BLD AUTO: 0.1 K/UL (ref 0–0.5)
EOSINOPHIL NFR BLD: 2.5 % (ref 0–8)
ERYTHROCYTE [DISTWIDTH] IN BLOOD BY AUTOMATED COUNT: 14.4 % (ref 11.5–14.5)
EST. GFR  (NO RACE VARIABLE): 35 ML/MIN/1.73 M^2
GLUCOSE SERPL-MCNC: 87 MG/DL (ref 70–110)
HCT VFR BLD AUTO: 27.9 % (ref 40–54)
HGB BLD-MCNC: 9.4 G/DL (ref 14–18)
IMM GRANULOCYTES # BLD AUTO: 0.02 K/UL (ref 0–0.04)
IMM GRANULOCYTES NFR BLD AUTO: 0.4 % (ref 0–0.5)
INR PPP: 2.7 (ref 0.8–1.2)
LYMPHOCYTES # BLD AUTO: 1.5 K/UL (ref 1–4.8)
LYMPHOCYTES NFR BLD: 29.7 % (ref 18–48)
MAGNESIUM SERPL-MCNC: 1.6 MG/DL (ref 1.6–2.6)
MCH RBC QN AUTO: 30.5 PG (ref 27–31)
MCHC RBC AUTO-ENTMCNC: 33.7 G/DL (ref 32–36)
MCV RBC AUTO: 91 FL (ref 82–98)
MONOCYTES # BLD AUTO: 0.5 K/UL (ref 0.3–1)
MONOCYTES NFR BLD: 10.6 % (ref 4–15)
NEUTROPHILS # BLD AUTO: 2.8 K/UL (ref 1.8–7.7)
NEUTROPHILS NFR BLD: 56.2 % (ref 38–73)
NRBC BLD-RTO: 0 /100 WBC
PHOSPHATE SERPL-MCNC: 3.7 MG/DL (ref 2.7–4.5)
PLATELET # BLD AUTO: 141 K/UL (ref 150–450)
PMV BLD AUTO: 10.9 FL (ref 9.2–12.9)
POCT GLUCOSE: 137 MG/DL (ref 70–110)
POTASSIUM SERPL-SCNC: 4.5 MMOL/L (ref 3.5–5.1)
PROTHROMBIN TIME: 26.2 SEC (ref 9–12.5)
RBC # BLD AUTO: 3.08 M/UL (ref 4.6–6.2)
SODIUM SERPL-SCNC: 139 MMOL/L (ref 136–145)
WBC # BLD AUTO: 4.89 K/UL (ref 3.9–12.7)
WBC #/AREA STL HPF: NORMAL /[HPF]

## 2022-10-01 PROCEDURE — 99217 PR OBSERVATION CARE DISCHARGE: CPT | Mod: ,,,

## 2022-10-01 PROCEDURE — G0378 HOSPITAL OBSERVATION PER HR: HCPCS

## 2022-10-01 PROCEDURE — 85610 PROTHROMBIN TIME: CPT

## 2022-10-01 PROCEDURE — 85025 COMPLETE CBC W/AUTO DIFF WBC: CPT

## 2022-10-01 PROCEDURE — 93005 ELECTROCARDIOGRAM TRACING: CPT

## 2022-10-01 PROCEDURE — 25000003 PHARM REV CODE 250

## 2022-10-01 PROCEDURE — 99217 PR OBSERVATION CARE DISCHARGE: ICD-10-PCS | Mod: ,,,

## 2022-10-01 PROCEDURE — 96361 HYDRATE IV INFUSION ADD-ON: CPT

## 2022-10-01 PROCEDURE — 36415 COLL VENOUS BLD VENIPUNCTURE: CPT

## 2022-10-01 PROCEDURE — 93010 ELECTROCARDIOGRAM REPORT: CPT | Mod: 76,,, | Performed by: INTERNAL MEDICINE

## 2022-10-01 PROCEDURE — 84100 ASSAY OF PHOSPHORUS: CPT

## 2022-10-01 PROCEDURE — 93010 ELECTROCARDIOGRAM REPORT: CPT | Mod: ,,, | Performed by: INTERNAL MEDICINE

## 2022-10-01 PROCEDURE — 80048 BASIC METABOLIC PNL TOTAL CA: CPT

## 2022-10-01 PROCEDURE — 83735 ASSAY OF MAGNESIUM: CPT

## 2022-10-01 PROCEDURE — 93010 EKG 12-LEAD: ICD-10-PCS | Mod: ,,, | Performed by: INTERNAL MEDICINE

## 2022-10-01 RX ORDER — CARVEDILOL 12.5 MG/1
12.5 TABLET ORAL 2 TIMES DAILY
Qty: 180 TABLET | Refills: 3
Start: 2022-10-01 | End: 2022-10-10

## 2022-10-01 RX ORDER — NIFEDIPINE 60 MG/1
60 TABLET, EXTENDED RELEASE ORAL DAILY
Qty: 90 TABLET | Refills: 4
Start: 2022-10-01 | End: 2022-10-10

## 2022-10-01 RX ORDER — BUMETANIDE 2 MG/1
2 TABLET ORAL 2 TIMES DAILY
Qty: 60 TABLET | Refills: 11
Start: 2022-10-01 | End: 2022-10-19

## 2022-10-01 RX ORDER — LISINOPRIL 2.5 MG/1
2.5 TABLET ORAL DAILY
Qty: 90 TABLET | Refills: 3
Start: 2022-10-01 | End: 2022-10-10

## 2022-10-01 RX ADMIN — OMEGA-3-ACID ETHYL ESTERS 2 G: 1 CAPSULE, LIQUID FILLED ORAL at 08:10

## 2022-10-01 RX ADMIN — ATORVASTATIN CALCIUM 80 MG: 40 TABLET, FILM COATED ORAL at 08:10

## 2022-10-01 RX ADMIN — ALLOPURINOL 100 MG: 100 TABLET ORAL at 08:10

## 2022-10-01 RX ADMIN — CARVEDILOL 12.5 MG: 12.5 TABLET, FILM COATED ORAL at 08:10

## 2022-10-01 RX ADMIN — SODIUM CHLORIDE 500 ML: 0.9 INJECTION, SOLUTION INTRAVENOUS at 10:10

## 2022-10-01 NOTE — PLAN OF CARE
Recommendations    1) Continue diet per MD.     2) If lactose-free supplement is warranted, rec'd [employer] Standard TID to optimize kcal/protein intake.     3) RD to monitor and f/u.    Goals: Meet % of kcal/protein needs by RD fu date  Nutrition Goal Status: new  Communication of RD Recs:  (POC)

## 2022-10-01 NOTE — ASSESSMENT & PLAN NOTE
Patient experienced light-headedness and dizziness for the last 3 days. Today morning, patient experienced first episode of syncope w/ LOC and had an unwitnessed fall in the bathroom. Sustained a laceration to left arm and left forehead. Patient endorses on and off diarrhea for 6 weeks. Suspect syncope is provoked by volume depletion. Denies any chest pain or dyspnea. Admitted to hospital medicine for syncope work-up.     - Cardiology consulted due to concern for heart block    - EKG shows LBBB w/ 1st degree AV block. No signs of 2nd or 3rd degree block   - Recommended fluid repletion, monitor BP and workup diarrheal illness  - Ordered Echo - EF 50%, Grade 1 LV DD   - Orthostatic vitals positive   - IV  ml bolus over 5 hours + maintenance fluids 100 ml/hr x 8 hours given  - Repeat orthostatic vitals remain positive 9/30 despite fluid repletion    - Second bolus IV  mL + maintenance fluids 100 ml/hr x 10 hours ordered  - Improvement in orthostatic vitals. Given IV  mL bolus prior to discharge  - Telemetry noted possible arrhthymia, EKG obtained, had undetermined rhythm, repeat EKG obtained  - Repeat EKG on 10/1 showed no changes in comparison to EKG at time of admission on 9/30

## 2022-10-01 NOTE — NURSING
Patient aaox4 sitting in recliner with wheels locked. Discharge instructions reviewed with patient and his spouse. They both verbalized understanding. Patient and spouse without questions and concerns at this time. Patient denies complaints at this time. No acute resp distress noted. Patient's transport requested.

## 2022-10-01 NOTE — ASSESSMENT & PLAN NOTE
- Continue Loperamide 2 mg PRN at home to prevent volume depletion  - CT abdomen/pelvis complete - no acute findings  - Stool culture pending   - C.diff culture negative  - AFP and CEA negative   - Gluten and lactose free diet trialed  - Ambulatory referral to GI placed

## 2022-10-01 NOTE — DISCHARGE INSTRUCTIONS
Hold ALL blood pressure medications until f/u with PCP in 1 week.    -Carvedilol   - Lisinopril   - Nifedipine   Hold Bumex until f/u with PCP in 1 week. You may take Bumex if you experience 3 lb weight gain in one day OR 5 lb weight gain in one week.

## 2022-10-01 NOTE — ASSESSMENT & PLAN NOTE
- On home coreg 12.5 mg BID, lisinopril 2.5 mg daily, home nifedipine 60 mg daily  - Patient orthostatic in hospital, held HTN medications  - Fluid repletion and monitor BP with q4h vitals  - Hold HTN meds at time of discharge  - F/u with PCP for HTN medication management

## 2022-10-01 NOTE — ASSESSMENT & PLAN NOTE
Patient's FSGs are controlled on current medication regimen.  Last A1c reviewed-   Lab Results   Component Value Date    HGBA1C 5.7 (H) 02/10/2022     Most recent fingerstick glucose reviewed-   No results for input(s): POCTGLUCOSE in the last 24 hours.  Current correctional scale  Low  Maintain anti-hyperglycemic dose as follows-   Antihyperglycemics (From admission, onward)    None        Hold Oral hypoglycemics while patient is in the hospital.

## 2022-10-01 NOTE — ASSESSMENT & PLAN NOTE
- Patient not taking plavix, did not order  - Continue coumadin, BP control and statin  - Troponin trended: 0.055 --> 0.093 --> 0.074  - Monitor for any changes in symptoms

## 2022-10-01 NOTE — ASSESSMENT & PLAN NOTE
H/O mechanical aortic valve replacement  - Daily coumadin per home nneka, pharmacy consulted for dosage  - Continue home regimen upon discharge  - Daily INR with INR goal 2-3

## 2022-10-01 NOTE — ASSESSMENT & PLAN NOTE
- , Cr 3.0 on 9/29  - IV fluids given overnight --> BUN 78, Cr 2.3 on 9/30  - IV fluids given overnight --> BUN 60, Cr 1.9 on 10/4  - Monitor renal function  - Avoid nephrotoxic agents

## 2022-10-01 NOTE — CONSULTS
"  [facility] - Telemetry Stepdown ([place] [id number])  Adult Nutrition  Consult Note    SUMMARY     Recommendations    1) Continue diet per MD.     2) If lactose-free supplement is warranted, rec'd [employer] Standard TID to optimize kcal/protein intake.     3) RD to monitor and f/u.    Goals: Meet % of kcal/protein needs by RD fu date  Nutrition Goal Status: new  Communication of RD Recs:  (POC)    Assessment and Plan  Nutrition Problem  Inadequate energy intake    Related to (etiology):   Altered GI structure/function, decreased appetite    Signs and Symptoms (as evidenced by):   Decreased PO intake PTA and during LOS     Interventions/Recommendations (treatment strategy):  Collaboration of nutrition care with other providers  Encourage meal intake as tolerated    Nutrition Diagnosis Status:   New    Reason for Assessment    Reason For Assessment: consult (diarrhea, poor appetite, wt loss)  Diagnosis:  (syncope, diarrhea)  Relevant Medical History: CAD, HLD, HTN, DMII, CKD3, gout  Interdisciplinary Rounds: did not attend    General Information Comments: Pt did not provide any past nutrition-related history d/t saying he is being discharged today. Though he did share that the frequency of diarrhea has improved since admit. Per chart review, wt appears stable. Pt on gluten free, lactose free diet currently; asking for handouts for home - appropriate education materials provided. Encouraged pt to speak with doctor regarding diet for d/c.    Nutrition Discharge Planning: adequate nutrition    Nutrition Risk Screen    Nutrition Risk Screen: no indicators present    Nutrition/Diet History    Food Allergies: NKFA    Anthropometrics    Temp: 97.9 °F (36.6 °C)  Height Method: Stated  Height: 5' 6" (167.6 cm)  Height (inches): 66 in  Weight Method: Bed Scale  Weight: 82.5 kg (181 lb 14.1 oz)  Weight (lb): 181.88 lb  Ideal Body Weight (IBW), Male: 142 lb  % Ideal Body Weight, Male (lb): 128.08 %  BMI (Calculated): " 29.4    Lab/Procedures/Meds    Pertinent Labs Reviewed: reviewed  Pertinent Medications Reviewed: reviewed    Estimated/Assessed Needs    Weight Used For Calorie Calculations: 82.5 kg (181 lb 14.1 oz)  Energy Calorie Requirements (kcal): 8856-3938 kcal/day (20-25 kcal/kg)  Energy Need Method: Kcal/kg  Protein Requirements: 83-99g pro/day  Weight Used For Protein Calculations: 82.5 kg (181 lb 14.1 oz)  Fluid Requirements (mL): 1ml/kcal or fluid per md  RDA Method (mL): 1650    Nutrition Prescription Ordered    Current Diet Order: Gluten free  Nutrition Order Comments: Lactose free    Evaluation of Received Nutrient/Fluid Intake    I/O: -1.7L since admit  Comments: LBM 9/30  Tolerance: tolerating  % Intake of Estimated Energy Needs: 25 - 50 %  % Meal Intake: 25 - 50 %    Nutrition Risk    Level of Risk/Frequency of Follow-up:  (1x/week) \    Monitor and Evaluation    Food and Nutrient Intake: energy intake, food and beverage intake  Anthropometric Measurements: weight change  Biochemical Data, Medical Tests and Procedures: electrolyte and renal panel, gastrointestinal profile, glucose/endocrine profile, inflammatory profile, lipid profile     Nutrition Follow-Up    RD Follow-up?: Yes

## 2022-10-01 NOTE — PLAN OF CARE
Problem: Adult Inpatient Plan of Care  Goal: Plan of Care Review  Outcome: Met  Goal: Patient-Specific Goal (Individualized)  Outcome: Met  Goal: Absence of Hospital-Acquired Illness or Injury  Outcome: Met  Goal: Optimal Comfort and Wellbeing  Outcome: Met  Goal: Readiness for Transition of Care  Outcome: Met     Problem: Infection  Goal: Absence of Infection Signs and Symptoms  Outcome: Met     Problem: Diabetes Comorbidity  Goal: Blood Glucose Level Within Targeted Range  Outcome: Met     Problem: Impaired Wound Healing  Goal: Optimal Wound Healing  Outcome: Met     Problem: Fall Injury Risk  Goal: Absence of Fall and Fall-Related Injury  Outcome: Met

## 2022-10-02 LAB
E COLI SXT1 STL QL IA: NEGATIVE
E COLI SXT2 STL QL IA: NEGATIVE

## 2022-10-03 ENCOUNTER — LAB VISIT (OUTPATIENT)
Dept: LAB | Facility: HOSPITAL | Age: 82
End: 2022-10-03
Attending: INTERNAL MEDICINE
Payer: MEDICARE

## 2022-10-03 DIAGNOSIS — E11.51 TYPE 2 DIABETES MELLITUS WITH DIABETIC PERIPHERAL ANGIOPATHY WITHOUT GANGRENE, WITHOUT LONG-TERM CURRENT USE OF INSULIN: ICD-10-CM

## 2022-10-03 LAB
ALBUMIN SERPL BCP-MCNC: 3.7 G/DL (ref 3.5–5.2)
ALP SERPL-CCNC: 54 U/L (ref 55–135)
ALT SERPL W/O P-5'-P-CCNC: 21 U/L (ref 10–44)
ANION GAP SERPL CALC-SCNC: 8 MMOL/L (ref 8–16)
AST SERPL-CCNC: 23 U/L (ref 10–40)
BACTERIA STL CULT: NORMAL
BILIRUB SERPL-MCNC: 0.4 MG/DL (ref 0.1–1)
BUN SERPL-MCNC: 45 MG/DL (ref 8–23)
CALCIUM SERPL-MCNC: 10.1 MG/DL (ref 8.7–10.5)
CHLORIDE SERPL-SCNC: 115 MMOL/L (ref 95–110)
CO2 SERPL-SCNC: 19 MMOL/L (ref 23–29)
CREAT SERPL-MCNC: 1.8 MG/DL (ref 0.5–1.4)
EST. GFR  (NO RACE VARIABLE): 37.3 ML/MIN/1.73 M^2
ESTIMATED AVG GLUCOSE: 108 MG/DL (ref 68–131)
GLUCOSE SERPL-MCNC: 67 MG/DL (ref 70–110)
HBA1C MFR BLD: 5.4 % (ref 4–5.6)
POTASSIUM SERPL-SCNC: 4.9 MMOL/L (ref 3.5–5.1)
PROT SERPL-MCNC: 6.8 G/DL (ref 6–8.4)
SODIUM SERPL-SCNC: 142 MMOL/L (ref 136–145)

## 2022-10-03 PROCEDURE — 80053 COMPREHEN METABOLIC PANEL: CPT | Performed by: INTERNAL MEDICINE

## 2022-10-03 PROCEDURE — 36415 COLL VENOUS BLD VENIPUNCTURE: CPT | Performed by: INTERNAL MEDICINE

## 2022-10-03 PROCEDURE — 83036 HEMOGLOBIN GLYCOSYLATED A1C: CPT | Performed by: INTERNAL MEDICINE

## 2022-10-04 ENCOUNTER — LAB VISIT (OUTPATIENT)
Dept: LAB | Facility: HOSPITAL | Age: 82
End: 2022-10-04
Attending: INTERNAL MEDICINE
Payer: MEDICARE

## 2022-10-04 ENCOUNTER — ANTI-COAG VISIT (OUTPATIENT)
Dept: CARDIOLOGY | Facility: CLINIC | Age: 82
End: 2022-10-04
Payer: MEDICARE

## 2022-10-04 DIAGNOSIS — Z95.2 H/O MECHANICAL AORTIC VALVE REPLACEMENT: ICD-10-CM

## 2022-10-04 DIAGNOSIS — Z79.01 LONG TERM CURRENT USE OF ANTICOAGULANT THERAPY: Primary | ICD-10-CM

## 2022-10-04 DIAGNOSIS — Z79.01 LONG TERM CURRENT USE OF ANTICOAGULANT THERAPY: ICD-10-CM

## 2022-10-04 LAB
CALPROTECTIN STL-MCNT: 32.6 MCG/G
INR PPP: 2.2 (ref 0.8–1.2)
PROTHROMBIN TIME: 21.9 SEC (ref 9–12.5)

## 2022-10-04 PROCEDURE — 93793 PR ANTICOAGULANT MGMT FOR PT TAKING WARFARIN: ICD-10-PCS | Mod: S$GLB,,,

## 2022-10-04 PROCEDURE — 36415 COLL VENOUS BLD VENIPUNCTURE: CPT | Performed by: INTERNAL MEDICINE

## 2022-10-04 PROCEDURE — 93793 ANTICOAG MGMT PT WARFARIN: CPT | Mod: S$GLB,,,

## 2022-10-04 PROCEDURE — 85610 PROTHROMBIN TIME: CPT | Performed by: INTERNAL MEDICINE

## 2022-10-05 ENCOUNTER — TELEPHONE (OUTPATIENT)
Dept: OPHTHALMOLOGY | Facility: CLINIC | Age: 82
End: 2022-10-05
Payer: MEDICARE

## 2022-10-05 ENCOUNTER — HOSPITAL ENCOUNTER (OUTPATIENT)
Dept: CARDIOLOGY | Facility: HOSPITAL | Age: 82
Discharge: HOME | End: 2022-10-05
Attending: INTERNAL MEDICINE
Payer: MEDICARE

## 2022-10-05 VITALS
HEART RATE: 65 BPM | WEIGHT: 181 LBS | DIASTOLIC BLOOD PRESSURE: 70 MMHG | BODY MASS INDEX: 29.09 KG/M2 | SYSTOLIC BLOOD PRESSURE: 140 MMHG | HEIGHT: 66 IN

## 2022-10-05 DIAGNOSIS — I27.9 PULMONARY HEART DISEASE: ICD-10-CM

## 2022-10-05 DIAGNOSIS — I25.708 CORONARY ARTERY DISEASE OF BYPASS GRAFT OF NATIVE HEART WITH STABLE ANGINA PECTORIS: ICD-10-CM

## 2022-10-05 DIAGNOSIS — Z95.2 H/O MECHANICAL AORTIC VALVE REPLACEMENT: ICD-10-CM

## 2022-10-05 DIAGNOSIS — I70.213 ATHEROSCLEROSIS OF NATIVE ARTERY OF BOTH LOWER EXTREMITIES WITH INTERMITTENT CLAUDICATION: ICD-10-CM

## 2022-10-05 DIAGNOSIS — H25.12 NUCLEAR SCLEROTIC CATARACT OF LEFT EYE: Primary | ICD-10-CM

## 2022-10-05 LAB
ASCENDING AORTA: 3.17 CM
AV INDEX (PROSTH): 0.36
AV MEAN GRADIENT: 13 MMHG
AV PEAK GRADIENT: 26 MMHG
AV VALVE AREA: 1.12 CM2
AV VELOCITY RATIO: 0.32
BSA FOR ECHO PROCEDURE: 1.96 M2
CV ECHO LV RWT: 0.34 CM
DOP CALC AO PEAK VEL: 2.57 M/S
DOP CALC AO VTI: 48.96 CM
DOP CALC LVOT AREA: 3.1 CM2
DOP CALC LVOT DIAMETER: 2 CM
DOP CALC LVOT PEAK VEL: 0.82 M/S
DOP CALC LVOT STROKE VOLUME: 55.08 CM3
DOP CALCLVOT PEAK VEL VTI: 17.54 CM
E/E' RATIO: 15.47 M/S
ECHO LV POSTERIOR WALL: 1 CM (ref 0.6–1.1)
EJECTION FRACTION: 45 %
FRACTIONAL SHORTENING: 27 % (ref 28–44)
IMMEDIATE ARM BP: 159 MMHG
IMMEDIATE LEFT ABI: 0.94
IMMEDIATE LEFT TIBIAL: 149 MMHG
IMMEDIATE RIGHT ABI: 0.47
IMMEDIATE RIGHT TIBIAL: 74 MMHG
INTERVENTRICULAR SEPTUM: 1.05 CM (ref 0.6–1.1)
IVRT: 71.36 MSEC
LA MAJOR: 6.31 CM
LA MINOR: 6.81 CM
LA WIDTH: 5.03 CM
LEFT ABI: 1.15
LEFT ANT TIBIAL SYS PSV: 38 CM/S
LEFT ARM BP: 157 MMHG
LEFT ATRIUM SIZE: 5.43 CM
LEFT ATRIUM VOLUME INDEX MOD: 59 ML/M2
LEFT ATRIUM VOLUME INDEX: 79.2 ML/M2
LEFT ATRIUM VOLUME MOD: 113.3 CM3
LEFT ATRIUM VOLUME: 152.08 CM3
LEFT CFA PSV: 111 CM/S
LEFT DORSALIS PEDIS PSV: 301 CM/S
LEFT DORSALIS PEDIS: 180 MMHG
LEFT EXTERNAL ILIAC PSV: 133 CM/S
LEFT INTERNAL DIMENSION IN SYSTOLE: 4.32 CM (ref 2.1–4)
LEFT PERONEAL SYS PSV: 43 CM/S
LEFT POPLITEAL PSV: 45 CM/S
LEFT POST TIBIAL SYS PSV: 25 CM/S
LEFT POSTERIOR TIBIAL: 151 MMHG
LEFT PROFUNDA SYS PSV: 93 CM/S
LEFT SUPER FEMORAL DIST SYS PSV: 120 CM/S
LEFT SUPER FEMORAL MID SYS PSV: 266 CM/S
LEFT SUPER FEMORAL OSTIAL SYS PSV: 161 CM/S
LEFT SUPER FEMORAL PROX SYS PSV: 186 CM/S
LEFT TIB/PER TRUNK SYS PSV: 97 CM/S
LEFT VENTRICLE DIASTOLIC VOLUME INDEX: 91.12 ML/M2
LEFT VENTRICLE DIASTOLIC VOLUME: 174.95 ML
LEFT VENTRICLE MASS INDEX: 130 G/M2
LEFT VENTRICLE SYSTOLIC VOLUME INDEX: 43.7 ML/M2
LEFT VENTRICLE SYSTOLIC VOLUME: 83.91 ML
LEFT VENTRICULAR INTERNAL DIMENSION IN DIASTOLE: 5.93 CM (ref 3.5–6)
LEFT VENTRICULAR MASS: 249.9 G
LV LATERAL E/E' RATIO: 11.6 M/S
LV SEPTAL E/E' RATIO: 23.2 M/S
MV PEAK E VEL: 1.16 M/S
OHS CV LEFT LOWER EXTREMITY ABI (NO CALC): 1.15
OHS CV RIGHT ABI LOWER EXTREMITY (NO CALC): 0.97
PISA TR MAX VEL: 3.11 M/S
RA MAJOR: 5.27 CM
RA PRESSURE: 3 MMHG
RA WIDTH: 5.1 CM
RIGHT ABI: 0.97
RIGHT ANT TIBIAL SYS PSV: 40 CM/S
RIGHT ARM BP: 152 MMHG
RIGHT CFA PSV: 418 CM/S
RIGHT DORSALIS PEDIS PSV: 38 CM/S
RIGHT DORSALIS PEDIS: 152 MMHG
RIGHT EXTERNAL ILLIAC PSV: 112 CM/S
RIGHT PERONEAL SYS PSV: 39 CM/S
RIGHT POPLITEAL PSV: 40 CM/S
RIGHT POST TIBIAL SYS PSV: 43 CM/S
RIGHT POSTERIOR TIBIAL: 119 MMHG
RIGHT PROFUNDA SYS PSV: 268 CM/S
RIGHT SUPER FEMORAL DIST SYS PSV: 56 CM/S
RIGHT SUPER FEMORAL MID SYS PSV: 65 CM/S
RIGHT SUPER FEMORAL OSTIAL SYS PSV: 130 CM/S
RIGHT SUPER FEMORAL PROX SYS PSV: 66 CM/S
RIGHT TIB/PER TRUNK SYS PSV: 6 CM/S
RIGHT VENTRICULAR END-DIASTOLIC DIMENSION: 5.44 CM
RV TISSUE DOPPLER FREE WALL SYSTOLIC VELOCITY 1 (APICAL 4 CHAMBER VIEW): 9.06 CM/S
SINUS: 3.42 CM
STJ: 2.79 CM
TDI LATERAL: 0.1 M/S
TDI SEPTAL: 0.05 M/S
TDI: 0.08 M/S
TOE RAISES: 50
TR MAX PG: 39 MMHG
TRICUSPID ANNULAR PLANE SYSTOLIC EXCURSION: 1.62 CM
TV REST PULMONARY ARTERY PRESSURE: 42 MMHG

## 2022-10-05 PROCEDURE — 93925 CV US DOPPLER ARTERIAL LEGS BILATERAL (CUPID ONLY): ICD-10-PCS | Mod: 26,,, | Performed by: INTERNAL MEDICINE

## 2022-10-05 PROCEDURE — 93924 ANKLE BRACHIAL INDICES (ABI): ICD-10-PCS | Mod: 26,,, | Performed by: INTERNAL MEDICINE

## 2022-10-05 PROCEDURE — 93306 ECHO (CUPID ONLY): ICD-10-PCS | Mod: 26,,, | Performed by: INTERNAL MEDICINE

## 2022-10-05 PROCEDURE — 93306 TTE W/DOPPLER COMPLETE: CPT | Mod: 26,,, | Performed by: INTERNAL MEDICINE

## 2022-10-05 PROCEDURE — 93925 LOWER EXTREMITY STUDY: CPT

## 2022-10-05 PROCEDURE — 93306 TTE W/DOPPLER COMPLETE: CPT

## 2022-10-05 PROCEDURE — 93924 LWR XTR VASC STDY BILAT: CPT | Mod: 26,,, | Performed by: INTERNAL MEDICINE

## 2022-10-05 PROCEDURE — 93925 LOWER EXTREMITY STUDY: CPT | Mod: 26,,, | Performed by: INTERNAL MEDICINE

## 2022-10-05 PROCEDURE — 93924 LWR XTR VASC STDY BILAT: CPT

## 2022-10-10 ENCOUNTER — OFFICE VISIT (OUTPATIENT)
Dept: INTERNAL MEDICINE | Facility: CLINIC | Age: 82
End: 2022-10-10
Payer: MEDICARE

## 2022-10-10 VITALS
HEIGHT: 66 IN | OXYGEN SATURATION: 98 % | DIASTOLIC BLOOD PRESSURE: 70 MMHG | BODY MASS INDEX: 28.49 KG/M2 | WEIGHT: 177.25 LBS | SYSTOLIC BLOOD PRESSURE: 130 MMHG | HEART RATE: 74 BPM

## 2022-10-10 DIAGNOSIS — I15.0 RENOVASCULAR HYPERTENSION: ICD-10-CM

## 2022-10-10 DIAGNOSIS — D69.6 THROMBOCYTOPENIA: ICD-10-CM

## 2022-10-10 DIAGNOSIS — E11.59 HYPERTENSION ASSOCIATED WITH DIABETES: ICD-10-CM

## 2022-10-10 DIAGNOSIS — N18.32 STAGE 3B CHRONIC KIDNEY DISEASE: Chronic | ICD-10-CM

## 2022-10-10 DIAGNOSIS — I15.2 HYPERTENSION ASSOCIATED WITH DIABETES: ICD-10-CM

## 2022-10-10 DIAGNOSIS — R19.7 DIARRHEA, UNSPECIFIED TYPE: ICD-10-CM

## 2022-10-10 DIAGNOSIS — E11.22 TYPE 2 DIABETES MELLITUS WITH STAGE 3B CHRONIC KIDNEY DISEASE, WITHOUT LONG-TERM CURRENT USE OF INSULIN: ICD-10-CM

## 2022-10-10 DIAGNOSIS — I25.708 CORONARY ARTERY DISEASE OF BYPASS GRAFT OF NATIVE HEART WITH STABLE ANGINA PECTORIS: ICD-10-CM

## 2022-10-10 DIAGNOSIS — E11.51 TYPE 2 DIABETES MELLITUS WITH DIABETIC PERIPHERAL ANGIOPATHY WITHOUT GANGRENE, WITHOUT LONG-TERM CURRENT USE OF INSULIN: ICD-10-CM

## 2022-10-10 DIAGNOSIS — Z87.39 HISTORY OF GOUT: ICD-10-CM

## 2022-10-10 DIAGNOSIS — R55 SYNCOPE, UNSPECIFIED SYNCOPE TYPE: Primary | ICD-10-CM

## 2022-10-10 DIAGNOSIS — N18.32 TYPE 2 DIABETES MELLITUS WITH STAGE 3B CHRONIC KIDNEY DISEASE, WITHOUT LONG-TERM CURRENT USE OF INSULIN: ICD-10-CM

## 2022-10-10 PROCEDURE — 1159F MED LIST DOCD IN RCRD: CPT | Mod: CPTII,S$GLB,, | Performed by: INTERNAL MEDICINE

## 2022-10-10 PROCEDURE — 99214 OFFICE O/P EST MOD 30 MIN: CPT | Mod: S$GLB,,, | Performed by: INTERNAL MEDICINE

## 2022-10-10 PROCEDURE — 3288F FALL RISK ASSESSMENT DOCD: CPT | Mod: CPTII,S$GLB,, | Performed by: INTERNAL MEDICINE

## 2022-10-10 PROCEDURE — 3072F LOW RISK FOR RETINOPATHY: CPT | Mod: CPTII,S$GLB,, | Performed by: INTERNAL MEDICINE

## 2022-10-10 PROCEDURE — 99499 RISK ADDL DX/OHS AUDIT: ICD-10-PCS | Mod: HCNC,S$GLB,, | Performed by: INTERNAL MEDICINE

## 2022-10-10 PROCEDURE — 1100F PTFALLS ASSESS-DOCD GE2>/YR: CPT | Mod: CPTII,S$GLB,, | Performed by: INTERNAL MEDICINE

## 2022-10-10 PROCEDURE — 3072F PR LOW RISK FOR RETINOPATHY: ICD-10-PCS | Mod: CPTII,S$GLB,, | Performed by: INTERNAL MEDICINE

## 2022-10-10 PROCEDURE — 1125F PR PAIN SEVERITY QUANTIFIED, PAIN PRESENT: ICD-10-PCS | Mod: CPTII,S$GLB,, | Performed by: INTERNAL MEDICINE

## 2022-10-10 PROCEDURE — 3078F PR MOST RECENT DIASTOLIC BLOOD PRESSURE < 80 MM HG: ICD-10-PCS | Mod: CPTII,S$GLB,, | Performed by: INTERNAL MEDICINE

## 2022-10-10 PROCEDURE — 99999 PR PBB SHADOW E&M-EST. PATIENT-LVL III: CPT | Mod: PBBFAC,,, | Performed by: INTERNAL MEDICINE

## 2022-10-10 PROCEDURE — 1159F PR MEDICATION LIST DOCUMENTED IN MEDICAL RECORD: ICD-10-PCS | Mod: CPTII,S$GLB,, | Performed by: INTERNAL MEDICINE

## 2022-10-10 PROCEDURE — 3075F PR MOST RECENT SYSTOLIC BLOOD PRESS GE 130-139MM HG: ICD-10-PCS | Mod: CPTII,S$GLB,, | Performed by: INTERNAL MEDICINE

## 2022-10-10 PROCEDURE — 1125F AMNT PAIN NOTED PAIN PRSNT: CPT | Mod: CPTII,S$GLB,, | Performed by: INTERNAL MEDICINE

## 2022-10-10 PROCEDURE — 99499 UNLISTED E&M SERVICE: CPT | Mod: HCNC,S$GLB,, | Performed by: INTERNAL MEDICINE

## 2022-10-10 PROCEDURE — 99999 PR PBB SHADOW E&M-EST. PATIENT-LVL III: ICD-10-PCS | Mod: PBBFAC,,, | Performed by: INTERNAL MEDICINE

## 2022-10-10 PROCEDURE — 3075F SYST BP GE 130 - 139MM HG: CPT | Mod: CPTII,S$GLB,, | Performed by: INTERNAL MEDICINE

## 2022-10-10 PROCEDURE — 3288F PR FALLS RISK ASSESSMENT DOCUMENTED: ICD-10-PCS | Mod: CPTII,S$GLB,, | Performed by: INTERNAL MEDICINE

## 2022-10-10 PROCEDURE — 1100F PR PT FALLS ASSESS DOC 2+ FALLS/FALL W/INJURY/YR: ICD-10-PCS | Mod: CPTII,S$GLB,, | Performed by: INTERNAL MEDICINE

## 2022-10-10 PROCEDURE — 3078F DIAST BP <80 MM HG: CPT | Mod: CPTII,S$GLB,, | Performed by: INTERNAL MEDICINE

## 2022-10-10 PROCEDURE — 99214 PR OFFICE/OUTPT VISIT, EST, LEVL IV, 30-39 MIN: ICD-10-PCS | Mod: S$GLB,,, | Performed by: INTERNAL MEDICINE

## 2022-10-10 RX ORDER — CARVEDILOL 12.5 MG/1
12.5 TABLET ORAL 2 TIMES DAILY WITH MEALS
Qty: 180 TABLET | Refills: 2 | Status: SHIPPED | OUTPATIENT
Start: 2022-10-10 | End: 2022-10-19

## 2022-10-10 NOTE — PROGRESS NOTES
81 yo M here for follow up his medical problems.   I saw him in May 2022.  He fell( passed out ) in bathroom after getting up to go to the bathroom.  He had a cut on his left forearm.  It was after urinating.  Went to the hospital and had a work up.  He had been having diarrhea.  This has resolved.  His carvedilol , lisinopril and nifedipine were stopped.  He was dehydrated and orthostatic .He was seen by nutrition.  Note was reviewed.    Other finding    CT of abdomen  aorta just below the level of the diaphragm (3.9 cm), infrarenal with mural thrombus (3.9 cm) and just superior to the aortic bifurcation (3.2 cm) (series 2 image 28, 65, 91).  Severe iliac calcific atherosclerosis..  Extensive calcification along the wall of the aorta and branches.  There is a segment of the mid abdominal aorta with calcification centrally, may represent short segment dissection, configuration similar to prior imaging.      Cardiology consulted due to concern for heart block           - EKG shows LBBB w/ 1st degree AV block. No signs of 2nd or 3rd degree block          - Recommended fluid repletion, monitor BP and workup diarrheal illness  - Ordered Echo - EF 50%, Grade 1 LV DD   - Orthostatic vitals positive   - IV  ml bolus -- given IV fluid.     Hypertension associated with diabetes  - On home coreg 12.5 mg BID, lisinopril 2.5 mg daily, home nifedipine 60 mg daily( all these were stopped)  -    Diarrhea  - Loperamide 2 mg PRN    - CT abdomen/pelvis complete - no acute findings  - Stool culture and C.diff culture pending ( negative culture no WBC seen)   - AFP and CEA negative   - Gluten and lactose free diet ordered   diarrhea better      He has  diabetes, hypertension, CAD, HLD, gout and mechanical aortic valve who Had epistaxis earlier this year .  He was hospitalized late in January and then again in early February for nose bleed they could not stop.  He is on both Coumadin and Plavix any has history of anemia.  His  last cardiovascular stent was in November of 2021. He did see ENT yesterday and they cauterized his nose he said he felt better and slept well last night.  He has had a couple nose bleeds but they are better.        He had COVID 3  Months  ago.  He came to the hospital he was given Remdesiver.  He says that he has pretty much recovered from the COVID.  He denies any shortness of breath or cough.          Diabetes mellitus type 2. Last A1c was  5.4   -- . He was put on amaryl 1mg last visit.   Weight today is 177 # lbs-  Weight   is down 8 lb Since last visit. . Patient denies polyuria, polydipsia, visual disturbances.         Hyperlipidemia. On lipitor 40 mg , lovaza. And Fenofibrate --Recent lipid panel was reviewed.  . He says he is taking all his cholesterol meds. Chol 103, hdl 34, Ldl 43.4.   2/10/22--        HTN.   Pt does not measure BPs at home. BP today is 130/70.  His  ACE was stopped due to JIM and hyperkalemia. K=4.9  He see nephrology in August- recent creatine was 1.8 -          CKD stage III. Pt saw Nephrology in March 2022 -- than note was reviewed.  He has had some acute on chronic kidney failure.  Most recent creatinine is 1.8 l   I year ago his creatinine was 2.3.  He has had several creatinines during hospitalization in the low threes.  His potasium is  4.9  He has been instructed on a a low potasium diet by nephrology, but is not following one.  Dr Carpenter retired and thye want helpi picking out new nnephrologits.          H/o partial colon resection due to diverticulosis. See above.  NO GI  bleeding since last visit.  NO recent diarrhea.          .    H/o CAD s/p CABG 2002, multiple stents, aortic valve replacement on coumadin. He saws he been having  chest pain for about 4 years ( not really per him)-- he has limited activity .   He had another stent placed back in November 2021 a zone Plavix.  He also went to cardiac rehab.--  . He has LÓPEZ but this is chronic and some left sided claudication.   " He has not had any chest pains recently.  He says the pain he is having is a burning in the throat. It has not changed in 3 years.        AAA- last us Showed 3.32 cm AAA- (9/29/22 )largest supra renal area. since last visit -            H/o gout. On allopurinol. Last flare was 18 mo ago in left foot. He remains on Allopurinol.      Valvular heart disease- with mechanical AV, some MVR and some TVR-- NO NO recent chest pains .  Reviewed recent PET stress test.   SOB with walking 75 feet- unchanged since last visit, and the one previous to that.                  Review of Systems    Constitutional: Negative for fever, chills and unexpected weight change.    HENT: Negative for congestion, rhinorrhea and sinus pressure.    Eyes: Negative for visual disturbance.    Respiratory: He Has some SOB, but this is unchanged. He can mow the lawn with walk behind mower.    This has not changed since last visit.    Cardiovascular:as above.  .    Gastrointestinal: Negative for nausea, vomiting, abdominal pain, and constipation.  He does have some diarrhea-  - as above.    Genitourinary: Negative for dysuria, urgency and difficulty urinating.    Musculoskeletal: He reports his back- that was bothering him last vsiit- is not bothering him recently.    Skin: Negative.    Neurological: Negative for dizziness, syncope and headaches.    Hematological: Negative.    Psychiatric/Behavioral: Negative.    All other systems reviewed and are negative.    Objective:          /70 (BP Location: Left arm, Patient Position: Sitting, BP Method: Medium (Manual))   Pulse 74   Ht 5' 6" (1.676 m)   Wt 80.4 kg (177 lb 4 oz)   SpO2 98%   BMI 28.61 kg/m²             Constitutional: He is oriented to person, place, and time. He appears well-developed and well-nourished. No distress.    HENT:    Head: Normocephalic and atraumatic. He has a benign lesion on his left check.    Mouth/Throat: Oropharynx is clear and moist. No oropharyngeal exudate.  "   Eyes: Conjunctivae and EOM are normal.    Neck: Normal range of motion. Neck supple. No JVD present. No tracheal deviation present.    Cardiovascular: Regular rhythm and normal heart sounds. Exam reveals no gallop and no friction rub.    Systolic murmur heard.     Pulmonary/Chest: Effort normal and breath sounds normal. No respiratory distress. He has no wheezes. He has no rales.    Abdominal: Soft. Bowel sounds are normal. He exhibits no distension. There is no tenderness. There is no rebound.   Musculoskeletal: Normal range of motion. He exhibits no edema and no tenderness.   Neurological: He is alert and oriented to person, place, and time.    Skin: Skin is warm and dry. No rash noted. He is not diaphoretic. No erythema.   Psychiatric: He has a normal mood and affect. His behavior is normal.                    resent hospital visit with syncope-- found to be dehydrated.  Given IV fluids.  See GI next week.  On immodium and on lactose free diet.  Off bp meds-- bp is fine.                   Assessment:      1.   DM (diabetes mellitus)     2.   Hyperlipidemia     3.   Chronic kidney disease, stage III (moderate)     4.   History of colon resection     5.   Hypertension     6.   CAD (coronary atherosclerotic disease)     7.    8.  9.  10.  History of aortic valve repair    AAA  AVR-mec- on coumadin   Colon polyps--     Plan:            will follo wup in 1 month   Will stop glimepride     Measure bp at home.      GI follow up coming up.  Cards follow up coming up.

## 2022-10-12 ENCOUNTER — LAB VISIT (OUTPATIENT)
Dept: LAB | Facility: HOSPITAL | Age: 82
End: 2022-10-12
Attending: INTERNAL MEDICINE
Payer: MEDICARE

## 2022-10-12 ENCOUNTER — ANTI-COAG VISIT (OUTPATIENT)
Dept: CARDIOLOGY | Facility: CLINIC | Age: 82
End: 2022-10-12
Payer: MEDICARE

## 2022-10-12 ENCOUNTER — TELEPHONE (OUTPATIENT)
Dept: OPHTHALMOLOGY | Facility: CLINIC | Age: 82
End: 2022-10-12
Payer: MEDICARE

## 2022-10-12 DIAGNOSIS — Z95.2 H/O MECHANICAL AORTIC VALVE REPLACEMENT: ICD-10-CM

## 2022-10-12 DIAGNOSIS — Z79.01 LONG TERM CURRENT USE OF ANTICOAGULANT THERAPY: ICD-10-CM

## 2022-10-12 DIAGNOSIS — Z79.01 LONG TERM CURRENT USE OF ANTICOAGULANT THERAPY: Primary | ICD-10-CM

## 2022-10-12 LAB
INR PPP: 2 (ref 0.8–1.2)
PROTHROMBIN TIME: 20.2 SEC (ref 9–12.5)

## 2022-10-12 PROCEDURE — 36415 COLL VENOUS BLD VENIPUNCTURE: CPT | Performed by: INTERNAL MEDICINE

## 2022-10-12 PROCEDURE — 85610 PROTHROMBIN TIME: CPT | Performed by: INTERNAL MEDICINE

## 2022-10-12 PROCEDURE — 93793 PR ANTICOAGULANT MGMT FOR PT TAKING WARFARIN: ICD-10-PCS | Mod: S$GLB,,,

## 2022-10-12 PROCEDURE — 93793 ANTICOAG MGMT PT WARFARIN: CPT | Mod: S$GLB,,,

## 2022-10-12 RX ORDER — ENOXAPARIN SODIUM 100 MG/ML
80 INJECTION SUBCUTANEOUS DAILY
Qty: 10 EACH | Refills: 1 | Status: SHIPPED | OUTPATIENT
Start: 2022-10-12 | End: 2022-10-13 | Stop reason: SDUPTHER

## 2022-10-12 NOTE — PROGRESS NOTES
INR at goal. Medications and chart reviewed. No changes noted to necessitate adjustment of warfarin or follow-up plan. See calendar.  Pt has upcoming Aortogram scheduled 10/20/22.     Patient lovenox bridging instructions    Height   66in             Weight    80.4kg          SCr     1.8           CrCl    ~29ml/min             Pre-Procedure Instructions:    Take last dose of warfarin on :        10/16/22                         Start enoxaparin injections the morning of:       10/18                          Enoxaparin dose is:         80mg               Once daily in the morning  Last dose of enoxaparin will be the morning of:        10/19/22                         Post-Procedure Instructions:    Restart warfarin dose on     10/20/22                        Unless directed otherwise by your physician  Restart lovenox injections on the morning of       10/21/22                    Unless directed otherwise by your physician  Call the coumadin clinic your physician has different recommendations post procedure

## 2022-10-13 RX ORDER — ENOXAPARIN SODIUM 100 MG/ML
80 INJECTION SUBCUTANEOUS DAILY
Qty: 8 ML | Refills: 1 | Status: ON HOLD | OUTPATIENT
Start: 2022-10-13 | End: 2022-10-24 | Stop reason: HOSPADM

## 2022-10-13 NOTE — PROGRESS NOTES
10/13/22  Wife called to report unable to get Lovenox at [facility] due to Insurance not covering it, needs prescription called in to [facility] [place] pharmacy in order for Insurance to cover it, wife's call back # [phone]

## 2022-10-18 ENCOUNTER — LAB VISIT (OUTPATIENT)
Dept: LAB | Facility: HOSPITAL | Age: 82
End: 2022-10-18
Attending: INTERNAL MEDICINE
Payer: MEDICARE

## 2022-10-18 DIAGNOSIS — H25.12 NUCLEAR SCLEROTIC CATARACT OF LEFT EYE: Primary | ICD-10-CM

## 2022-10-18 DIAGNOSIS — Z01.810 PRE-OPERATIVE CARDIOVASCULAR EXAMINATION: ICD-10-CM

## 2022-10-18 LAB
ANION GAP SERPL CALC-SCNC: 4 MMOL/L (ref 8–16)
BASOPHILS # BLD AUTO: 0.02 K/UL (ref 0–0.2)
BASOPHILS NFR BLD: 0.4 % (ref 0–1.9)
BUN SERPL-MCNC: 34 MG/DL (ref 8–23)
CALCIUM SERPL-MCNC: 9.3 MG/DL (ref 8.7–10.5)
CHLORIDE SERPL-SCNC: 115 MMOL/L (ref 95–110)
CO2 SERPL-SCNC: 20 MMOL/L (ref 23–29)
CREAT SERPL-MCNC: 1.9 MG/DL (ref 0.5–1.4)
DIFFERENTIAL METHOD: ABNORMAL
EOSINOPHIL # BLD AUTO: 0.1 K/UL (ref 0–0.5)
EOSINOPHIL NFR BLD: 1.8 % (ref 0–8)
ERYTHROCYTE [DISTWIDTH] IN BLOOD BY AUTOMATED COUNT: 15.8 % (ref 11.5–14.5)
EST. GFR  (NO RACE VARIABLE): 35 ML/MIN/1.73 M^2
GLUCOSE SERPL-MCNC: 80 MG/DL (ref 70–110)
HCT VFR BLD AUTO: 28.4 % (ref 40–54)
HGB BLD-MCNC: 8.8 G/DL (ref 14–18)
IMM GRANULOCYTES # BLD AUTO: 0.02 K/UL (ref 0–0.04)
IMM GRANULOCYTES NFR BLD AUTO: 0.4 % (ref 0–0.5)
LYMPHOCYTES # BLD AUTO: 1 K/UL (ref 1–4.8)
LYMPHOCYTES NFR BLD: 19.4 % (ref 18–48)
MCH RBC QN AUTO: 30.4 PG (ref 27–31)
MCHC RBC AUTO-ENTMCNC: 31 G/DL (ref 32–36)
MCV RBC AUTO: 98 FL (ref 82–98)
MONOCYTES # BLD AUTO: 0.5 K/UL (ref 0.3–1)
MONOCYTES NFR BLD: 10.7 % (ref 4–15)
NEUTROPHILS # BLD AUTO: 3.4 K/UL (ref 1.8–7.7)
NEUTROPHILS NFR BLD: 67.3 % (ref 38–73)
NRBC BLD-RTO: 0 /100 WBC
PLATELET # BLD AUTO: 171 K/UL (ref 150–450)
PMV BLD AUTO: 11 FL (ref 9.2–12.9)
POTASSIUM SERPL-SCNC: 5.7 MMOL/L (ref 3.5–5.1)
RBC # BLD AUTO: 2.89 M/UL (ref 4.6–6.2)
SODIUM SERPL-SCNC: 139 MMOL/L (ref 136–145)
WBC # BLD AUTO: 5.05 K/UL (ref 3.9–12.7)

## 2022-10-18 PROCEDURE — 36415 COLL VENOUS BLD VENIPUNCTURE: CPT | Performed by: INTERNAL MEDICINE

## 2022-10-18 PROCEDURE — 85025 COMPLETE CBC W/AUTO DIFF WBC: CPT | Performed by: INTERNAL MEDICINE

## 2022-10-18 PROCEDURE — 80048 BASIC METABOLIC PNL TOTAL CA: CPT | Performed by: INTERNAL MEDICINE

## 2022-10-18 RX ORDER — PREDNISOLONE ACETATE-GATIFLOXACIN-BROMFENAC .75; 5; 1 MG/ML; MG/ML; MG/ML
1 SUSPENSION/ DROPS OPHTHALMIC 3 TIMES DAILY
Qty: 5 ML | Refills: 3 | Status: ON HOLD | OUTPATIENT
Start: 2022-10-18 | End: 2022-12-23 | Stop reason: CLARIF

## 2022-10-18 NOTE — TELEPHONE ENCOUNTER
----- Message from Gokul Ac sent at 10/18/2022 11:39 AM CDT -----  Contact: @ [facility] [phone]  Pt wife calling regarding not [facility] not receiving the script he needs for sx. Please contact [facility] or the pt wife to further assist.

## 2022-10-19 ENCOUNTER — OFFICE VISIT (OUTPATIENT)
Dept: GASTROENTEROLOGY | Facility: CLINIC | Age: 82
End: 2022-10-19
Payer: MEDICARE

## 2022-10-19 ENCOUNTER — OFFICE VISIT (OUTPATIENT)
Dept: OTOLARYNGOLOGY | Facility: CLINIC | Age: 82
End: 2022-10-19
Payer: MEDICARE

## 2022-10-19 VITALS — HEIGHT: 66 IN | BODY MASS INDEX: 27.8 KG/M2 | WEIGHT: 173 LBS

## 2022-10-19 VITALS — BODY MASS INDEX: 28.81 KG/M2 | HEIGHT: 66 IN | WEIGHT: 179.25 LBS

## 2022-10-19 DIAGNOSIS — Z79.01 ANTICOAGULATED BY ANTICOAGULATION TREATMENT: ICD-10-CM

## 2022-10-19 DIAGNOSIS — D50.9 IRON DEFICIENCY ANEMIA, UNSPECIFIED IRON DEFICIENCY ANEMIA TYPE: ICD-10-CM

## 2022-10-19 DIAGNOSIS — R19.7 DIARRHEA IN ADULT PATIENT: Primary | ICD-10-CM

## 2022-10-19 DIAGNOSIS — K21.9 GASTROESOPHAGEAL REFLUX DISEASE, UNSPECIFIED WHETHER ESOPHAGITIS PRESENT: ICD-10-CM

## 2022-10-19 DIAGNOSIS — R55 SYNCOPE: ICD-10-CM

## 2022-10-19 DIAGNOSIS — R04.0 RECURRENT EPISTAXIS: Primary | ICD-10-CM

## 2022-10-19 DIAGNOSIS — I50.9 CONGESTIVE HEART FAILURE, UNSPECIFIED HF CHRONICITY, UNSPECIFIED HEART FAILURE TYPE: ICD-10-CM

## 2022-10-19 DIAGNOSIS — I27.20 PULMONARY HYPERTENSION: ICD-10-CM

## 2022-10-19 DIAGNOSIS — N18.9 CHRONIC RENAL IMPAIRMENT, UNSPECIFIED CKD STAGE: ICD-10-CM

## 2022-10-19 PROCEDURE — 1126F AMNT PAIN NOTED NONE PRSNT: CPT | Mod: CPTII,S$GLB,, | Performed by: INTERNAL MEDICINE

## 2022-10-19 PROCEDURE — 1101F PR PT FALLS ASSESS DOC 0-1 FALLS W/OUT INJ PAST YR: ICD-10-PCS | Mod: CPTII,S$GLB,, | Performed by: INTERNAL MEDICINE

## 2022-10-19 PROCEDURE — 99499 UNLISTED E&M SERVICE: CPT | Mod: HCNC,S$GLB,, | Performed by: INTERNAL MEDICINE

## 2022-10-19 PROCEDURE — 3288F FALL RISK ASSESSMENT DOCD: CPT | Mod: CPTII,S$GLB,, | Performed by: OTOLARYNGOLOGY

## 2022-10-19 PROCEDURE — 1160F PR REVIEW ALL MEDS BY PRESCRIBER/CLIN PHARMACIST DOCUMENTED: ICD-10-PCS | Mod: CPTII,S$GLB,, | Performed by: OTOLARYNGOLOGY

## 2022-10-19 PROCEDURE — 99213 OFFICE O/P EST LOW 20 MIN: CPT | Mod: 25,S$GLB,, | Performed by: OTOLARYNGOLOGY

## 2022-10-19 PROCEDURE — 99213 PR OFFICE/OUTPT VISIT, EST, LEVL III, 20-29 MIN: ICD-10-PCS | Mod: 25,S$GLB,, | Performed by: OTOLARYNGOLOGY

## 2022-10-19 PROCEDURE — 1160F RVW MEDS BY RX/DR IN RCRD: CPT | Mod: CPTII,S$GLB,, | Performed by: INTERNAL MEDICINE

## 2022-10-19 PROCEDURE — 1160F RVW MEDS BY RX/DR IN RCRD: CPT | Mod: CPTII,S$GLB,, | Performed by: OTOLARYNGOLOGY

## 2022-10-19 PROCEDURE — 99999 PR PBB SHADOW E&M-EST. PATIENT-LVL III: CPT | Mod: PBBFAC,,, | Performed by: OTOLARYNGOLOGY

## 2022-10-19 PROCEDURE — 1160F PR REVIEW ALL MEDS BY PRESCRIBER/CLIN PHARMACIST DOCUMENTED: ICD-10-PCS | Mod: CPTII,S$GLB,, | Performed by: INTERNAL MEDICINE

## 2022-10-19 PROCEDURE — 1126F PR PAIN SEVERITY QUANTIFIED, NO PAIN PRESENT: ICD-10-PCS | Mod: CPTII,S$GLB,, | Performed by: INTERNAL MEDICINE

## 2022-10-19 PROCEDURE — 3288F FALL RISK ASSESSMENT DOCD: CPT | Mod: CPTII,S$GLB,, | Performed by: INTERNAL MEDICINE

## 2022-10-19 PROCEDURE — 1159F MED LIST DOCD IN RCRD: CPT | Mod: CPTII,S$GLB,, | Performed by: INTERNAL MEDICINE

## 2022-10-19 PROCEDURE — 99999 PR PBB SHADOW E&M-EST. PATIENT-LVL IV: ICD-10-PCS | Mod: PBBFAC,,, | Performed by: INTERNAL MEDICINE

## 2022-10-19 PROCEDURE — 1126F AMNT PAIN NOTED NONE PRSNT: CPT | Mod: CPTII,S$GLB,, | Performed by: OTOLARYNGOLOGY

## 2022-10-19 PROCEDURE — 3072F LOW RISK FOR RETINOPATHY: CPT | Mod: CPTII,S$GLB,, | Performed by: INTERNAL MEDICINE

## 2022-10-19 PROCEDURE — 1101F PR PT FALLS ASSESS DOC 0-1 FALLS W/OUT INJ PAST YR: ICD-10-PCS | Mod: CPTII,S$GLB,, | Performed by: OTOLARYNGOLOGY

## 2022-10-19 PROCEDURE — 3288F PR FALLS RISK ASSESSMENT DOCUMENTED: ICD-10-PCS | Mod: CPTII,S$GLB,, | Performed by: OTOLARYNGOLOGY

## 2022-10-19 PROCEDURE — 3072F PR LOW RISK FOR RETINOPATHY: ICD-10-PCS | Mod: CPTII,S$GLB,, | Performed by: INTERNAL MEDICINE

## 2022-10-19 PROCEDURE — 1159F MED LIST DOCD IN RCRD: CPT | Mod: CPTII,S$GLB,, | Performed by: OTOLARYNGOLOGY

## 2022-10-19 PROCEDURE — 99999 PR PBB SHADOW E&M-EST. PATIENT-LVL IV: CPT | Mod: PBBFAC,,, | Performed by: INTERNAL MEDICINE

## 2022-10-19 PROCEDURE — 30903 CONTROL OF NOSEBLEED: CPT | Mod: LT,S$GLB,, | Performed by: OTOLARYNGOLOGY

## 2022-10-19 PROCEDURE — 1126F PR PAIN SEVERITY QUANTIFIED, NO PAIN PRESENT: ICD-10-PCS | Mod: CPTII,S$GLB,, | Performed by: OTOLARYNGOLOGY

## 2022-10-19 PROCEDURE — 1101F PT FALLS ASSESS-DOCD LE1/YR: CPT | Mod: CPTII,S$GLB,, | Performed by: INTERNAL MEDICINE

## 2022-10-19 PROCEDURE — 99203 PR OFFICE/OUTPT VISIT, NEW, LEVL III, 30-44 MIN: ICD-10-PCS | Mod: S$GLB,,, | Performed by: INTERNAL MEDICINE

## 2022-10-19 PROCEDURE — 1159F PR MEDICATION LIST DOCUMENTED IN MEDICAL RECORD: ICD-10-PCS | Mod: CPTII,S$GLB,, | Performed by: OTOLARYNGOLOGY

## 2022-10-19 PROCEDURE — 1101F PT FALLS ASSESS-DOCD LE1/YR: CPT | Mod: CPTII,S$GLB,, | Performed by: OTOLARYNGOLOGY

## 2022-10-19 PROCEDURE — 99203 OFFICE O/P NEW LOW 30 MIN: CPT | Mod: S$GLB,,, | Performed by: INTERNAL MEDICINE

## 2022-10-19 PROCEDURE — 3288F PR FALLS RISK ASSESSMENT DOCUMENTED: ICD-10-PCS | Mod: CPTII,S$GLB,, | Performed by: INTERNAL MEDICINE

## 2022-10-19 PROCEDURE — 3072F PR LOW RISK FOR RETINOPATHY: ICD-10-PCS | Mod: CPTII,S$GLB,, | Performed by: OTOLARYNGOLOGY

## 2022-10-19 PROCEDURE — 3072F LOW RISK FOR RETINOPATHY: CPT | Mod: CPTII,S$GLB,, | Performed by: OTOLARYNGOLOGY

## 2022-10-19 PROCEDURE — 99999 PR PBB SHADOW E&M-EST. PATIENT-LVL III: ICD-10-PCS | Mod: PBBFAC,,, | Performed by: OTOLARYNGOLOGY

## 2022-10-19 PROCEDURE — 1159F PR MEDICATION LIST DOCUMENTED IN MEDICAL RECORD: ICD-10-PCS | Mod: CPTII,S$GLB,, | Performed by: INTERNAL MEDICINE

## 2022-10-19 PROCEDURE — 30903 PR CTRL NOSEBLEED,ANTER,COMPLEX: ICD-10-PCS | Mod: LT,S$GLB,, | Performed by: OTOLARYNGOLOGY

## 2022-10-19 NOTE — Clinical Note
Lab work 2nd floor in stool studies tomorrow 2nd floor turn in Please schedule patient endoscopy schedule her appointment to schedule his EGD 2nd floor Return GI clinic 4 weeks for follow-up okay for telemedicine video visit Thank you

## 2022-10-19 NOTE — DISCHARGE SUMMARY
[facility] [address]  [facility] Medicine  Discharge Summary    Patient Name: Dariel Urbina  MRN: [id number]  Patient Class: OP- Observation  Admission Date: 9/29/2022  Hospital Length of Stay: 0 days  Discharge Date and Time: 10/1/2022  4:47 PM  Attending Physician: Chris Taylor MD   Discharging Provider: Shweta Diallo PA-C  Primary Care Provider: Devon Langston MD  [facility] Medicine Team: [facility] HOSP MED E Shweta Diallo PA-C    HPI:   Dariel Urbina is a 82 y.o. male with PMHx of CAD s/p CABG, mechanical aortic valve, on chronic anticoagulation with Coumadin, CHF, HLD, HTN, T2DM and CKD presents to the ED with syncope. Patient states he has been feeling light headed when he wakes up for the past 3 mornings. Today morning, he woke up, went to use the restroom and had an unwitnessed syncopal episode w/ LOC. He hit his head on an unknown surface in the bathroom, sustained a laceration to his left forehead and left arm. He felt light headed and dizzy prior to the syncopal episode. The patient has also been having multiple episodes of diarrhea on and off for 6 weeks. States the diarrhea is very watery and brown in color, denies any hematochezia. Of note, patient states his appetite has decreased over the last 2-3 months, specifically since his 82st birthday. He states in drinks a lot of water still, but his food intake is minimal. Endorses recent weight loss, which he believes is due to his poor appetite. Denies any chest pain, dyspnea, F/N, recent sick contacts, recent antibiotic use or any abdominal pain.     ED: /103. CBC wnl. INR 2.6. CMP: , Cr 3.0, Glucose 57. . Troponin 0.055. UA wnl. CXR:    No acute intrathoracic abnormality.  No detrimental change from prior. CT Head and Cervical Spine without contrast: No acute intracranial abnormality, Multilevel degenerative changes of the cervical spine. Echo ordered.     * No surgery found *      Hospital Course:    Patient admitted to hospital medicine observation unit with chief complaint of syncope and intermittent diarrhea x 6 weeks. Had unwitnessed fall at home in bathroom, suffered laceration to L forehead and L arm. CTH and CT Cervical spine negative for acute abnormalities. Interventional cardiology consulted due to concern for heart block. EKG showed LBBB w/ 1st degree AV block, no signs of 2nd or 3rd degree block. IC recommended echo, BP control and volume repletion 2/2 diarrhea. Echo showed EF 50% and Grade 1 LV DD - slight improvement from previous echo in 2020. Orthostatics positive, IVF repletion as indicated. CT A/P ordered for persistent diarrhea - no acute findings. Endorsed substantial weight loss, possibly 2/2 poor PO intake and decreased appetite. CEA/AFP negative. Stool culture, c diff, and WBC pending. Started gluten free/lactose free diet. Telemetry noted possible arrhythmia, EKG obtained - had undetermined rhythm so a repeat EKG was ordered. Repeat EKG showed no changes from his EKG at time of admission. Patient stable for discharge. To f/u with PCP and GI outpatient.      Goals of Care Treatment Preferences:  Code Status: Full Code    Consults:   Consults (From admission, onward)        Status Ordering Provider     Inpatient consult to Registered Dietitian/Nutritionist  Once        Provider:  (Not yet assigned)    Completed SHANA MALDONADO     Inpatient consult to Cardiology  Once        Provider:  (Not yet assigned)    Completed BONNIE PARHAM          * Syncope  Patient experienced light-headedness and dizziness for the last 3 days. Today morning, patient experienced first episode of syncope w/ LOC and had an unwitnessed fall in the bathroom. Sustained a laceration to left arm and left forehead. Patient endorses on and off diarrhea for 6 weeks. Suspect syncope is provoked by volume depletion. Denies any chest pain or dyspnea. Admitted to hospital medicine for syncope work-up.     - Cardiology  consulted due to concern for heart block    - EKG shows LBBB w/ 1st degree AV block. No signs of 2nd or 3rd degree block   - Recommended fluid repletion, monitor BP and workup diarrheal illness  - Ordered Echo - EF 50%, Grade 1 LV DD   - Orthostatic vitals positive   - IV  ml bolus over 5 hours + maintenance fluids 100 ml/hr x 8 hours given  - Repeat orthostatic vitals remain positive 9/30 despite fluid repletion    - Second bolus IV  mL + maintenance fluids 100 ml/hr x 10 hours ordered  - Improvement in orthostatic vitals. Given IV  mL bolus prior to discharge  - Telemetry noted possible arrhthymia, EKG obtained, had undetermined rhythm, repeat EKG obtained  - Repeat EKG on 10/1 showed no changes in comparison to EKG at time of admission on 9/30    Diarrhea  - Continue Loperamide 2 mg PRN at home to prevent volume depletion  - CT abdomen/pelvis complete - no acute findings  - Stool culture pending   - C.diff culture negative  - AFP and CEA negative   - Gluten and lactose free diet trialed  - Ambulatory referral to GI placed    Hypertension associated with diabetes  - On home coreg 12.5 mg BID, lisinopril 2.5 mg daily, home nifedipine 60 mg daily  - Patient orthostatic in hospital, held HTN medications  - Fluid repletion and monitor BP with q4h vitals  - Hold HTN meds at time of discharge  - F/u with PCP for HTN medication management    Stage 3b chronic kidney disease  - , Cr 3.0 on 9/29  - IV fluids given overnight --> BUN 78, Cr 2.3 on 9/30  - IV fluids given overnight --> BUN 60, Cr 1.9 on 10/4  - Monitor renal function  - Avoid nephrotoxic agents    Type 2 diabetes mellitus with stage 3 chronic kidney disease, without long-term current use of insulin  Patient's FSGs are controlled on current medication regimen.  Last A1c reviewed-   Lab Results   Component Value Date    HGBA1C 5.7 (H) 02/10/2022     Most recent fingerstick glucose reviewed-   No results for input(s): POCTGLUCOSE in the  last 24 hours.  Current correctional scale  Low  Maintain anti-hyperglycemic dose as follows-   Antihyperglycemics (From admission, onward)    None        Hold Oral hypoglycemics while patient is in the hospital.    History of gout  - Continue allopurinol 100 mg daily    Hyperlipidemia associated with type 2 diabetes mellitus  - Home crestor 40 mg qhs not available on hospital formulary  - Start atorvastatin 80 mg daily    Long term current use of anticoagulant therapy  H/O mechanical aortic valve replacement  - Daily coumadin per home nneka, pharmacy consulted for dosage  - Continue home regimen upon discharge  - Daily INR with INR goal 2-3    Coronary artery disease of bypass graft of native heart with stable angina pectoris  - Patient not taking plavix, did not order  - Continue coumadin, BP control and statin  - Troponin trended: 0.055 --> 0.093 --> 0.074  - Monitor for any changes in symptoms      Final Active Diagnoses:    Diagnosis Date Noted POA    PRINCIPAL PROBLEM:  Syncope [R55] 09/29/2022 Yes    Diarrhea [R19.7] 03/31/2017 Yes    Hypertension associated with diabetes [E11.59, I15.2] 09/25/2019 Yes    Stage 3b chronic kidney disease [N18.32] 05/27/2015 Yes     Chronic    H/O mechanical aortic valve replacement [Z95.2] 09/17/2014 Not Applicable    Type 2 diabetes mellitus with stage 3 chronic kidney disease, without long-term current use of insulin [E11.22, N18.30] 10/02/2013 Yes    Hyperlipidemia associated with type 2 diabetes mellitus [E11.69, E78.5] 09/26/2012 Yes    Long term current use of anticoagulant therapy [Z79.01] 09/26/2012 Not Applicable    History of gout [Z87.39] 09/26/2012 Yes    Coronary artery disease of bypass graft of native heart with stable angina pectoris [I25.708] 09/11/2012 Yes      Problems Resolved During this Admission:     Discharged Condition: good    Disposition: Home or Self Care    Follow Up:   Follow-up Information     Devon Langston MD Follow up in 1 week(s).     Specialty: Internal Medicine  Contact information:  [address]  [facility] [id number]  [phone]                       Patient Instructions:      Ambulatory referral/consult to Gastroenterology   Standing Status: Future   Referral Priority: Routine Referral Type: Consultation   Referral Reason: Specialty Services Required   Requested Specialty: Gastroenterology   Number of Visits Requested: 1     Diet Cardiac     Notify your health care provider if you experience any of the following:  temperature >100.4     Notify your health care provider if you experience any of the following:  persistent dizziness, light-headedness, or visual disturbances     Notify your health care provider if you experience any of the following:  severe uncontrolled pain     Notify your health care provider if you experience any of the following:  persistent nausea and vomiting or diarrhea     Notify your health care provider if you experience any of the following:  severe persistent headache     Activity as tolerated       Pending Diagnostic Studies:     None         Medications:  Reconciled Home Medications:      Medication List      CHANGE how you take these medications    bumetanide 2 MG tablet  Commonly known as: BUMEX  Take 1 tablet (2 mg total) by mouth 2 (two) times daily. Hold until f/u with PCP. May take for up to 3 days in setting of: weight gain of 5 lbs in a week or 3 lbs in one day  What changed: additional instructions     warfarin 5 MG tablet  Commonly known as: COUMADIN  Take 2 tablets (10mg) by mouth Sunday, then 1.5 tablets (7.5mg) by mouth all other days as instructed by Coumadin Clinic.  What changed: additional instructions        CONTINUE taking these medications    allopurinoL 100 MG tablet  Commonly known as: ZYLOPRIM  TAKE 1 TABLET EVERY DAY     azelastine 137 mcg (0.1 %) nasal spray  Commonly known as: ASTELIN  1 spray by Nasal route 2 (two) times daily as needed for Rhinitis.     bismuth subsalicylate  262 mg/15 mL suspension  Commonly known as: PEPTO BISMOL  Take 30 mLs by mouth once as needed (Diarrhea).     clopidogreL 75 mg tablet  Commonly known as: PLAVIX  Take 1 tablet (75 mg total) by mouth once daily.     ENSURE ORAL  Take 1-2 Cans by mouth once daily.     isosorbide mononitrate 120 MG 24 hr tablet  Commonly known as: IMDUR  Take 1 tablet (120 mg total) by mouth once daily.     loperamide 2 mg Tab  Commonly known as: IMODIUM A-D  Take 2 capsules at onset then 1 capsule as needed for diarrhea.     nitroGLYCERIN 0.4 MG SL tablet  Commonly known as: NITROSTAT  Place 1 tablet (0.4 mg total) under the tongue every 5 (five) minutes as needed. As needed for chest pain     rosuvastatin 40 MG Tab  Commonly known as: CRESTOR  TAKE 1 TABLET EVERY EVENING.        STOP taking these medications    carvediloL 12.5 MG tablet  Commonly known as: COREG     lisinopriL 2.5 MG tablet  Commonly known as: PRINIVIL,ZESTRIL     NIFEdipine 60 MG (OSM) 24 hr tablet  Commonly known as: PROCARDIA-XL        ASK your doctor about these medications    omega-3 acid ethyl esters 1 gram capsule  Commonly known as: LOVAZA  Take 2 capsules (2 g total) by mouth 2 (two) times daily.          Indwelling Lines/Drains at time of discharge:   Lines/Drains/Airways     None               Time spent on the discharge of patient: 36 minutes      Shweta Diallo PA-C  Department of Hospital Medicine  [facility] - Telemetry Stepdown ([place]-[facility])

## 2022-10-19 NOTE — PROGRESS NOTES
"  Subjective:      aDriel is a 82 y.o. male who comes for follow-up of nosebleeds.  His last visit with me was on 2/9/2022.  Cauterized at that visit, no bleeds until overnight this morning, had acute left sided nosebleed.  Now controlled with cotton in nose but has bloody drip in throat.  On Plavix and warfarin.        %       The patient's medications, allergies, past medical, surgical, social and family histories were reviewed and updated as appropriate.    A detailed review of systems was obtained with pertinent positives as per the above HPI, and otherwise negative.        Objective:     Ht 5' 6" (1.676 m)   Wt 78.5 kg (173 lb)   BMI 27.92 kg/m²        Constitutional:   He appears well-developed. He is cooperative.     Head:  Normocephalic.     Nose:  No mucosal edema, rhinorrhea, septal deviation or polyps. Epistaxis is observed. Turbinates normal, no turbinate masses and no turbinate hypertrophy.  Right sinus exhibits no maxillary sinus tenderness and no frontal sinus tenderness. Left sinus exhibits no maxillary sinus tenderness and no frontal sinus tenderness.     Mouth/Throat  Oropharynx clear and moist without lesions or asymmetry. No oropharyngeal exudate or posterior oropharyngeal erythema.     Neck:  No adenopathy. Normal range of motion present.     He has no cervical adenopathy.     Procedure    Control of Epistaxis    Indication:  Epistaxis    Informed consent was obtained prior to proceeding.  The left nasal cavity was anesthetized with topical 4% lidocaine.  Bleeding was localized to the left nasal cavity on the mid-septum and cauterized with electrocautery with bipolar forceps.  Local anesthetic injection of lidocaine was utilized prior to the cauterization.    The patient tolerated the procedure well.      Data Reviewed    WBC (K/uL)   Date Value   10/18/2022 5.05     Eosinophil % (%)   Date Value   10/18/2022 1.8     Eos # (K/uL)   Date Value   10/18/2022 0.1     Platelets (K/uL)   Date Value "   10/18/2022 171     Glucose (mg/dL)   Date Value   10/18/2022 80     No results found for: IGE        Assessment:     1. Recurrent epistaxis    2. Anticoagulated by anticoagulation treatment         Plan:     Saline spray BID for 5 days then prn.  Follow up if symptoms worsen or fail to improve.

## 2022-10-19 NOTE — PROGRESS NOTES
10/19/22-pt's wife called today stating he had bad nose bleed last night, he has appt with ENT today.  They have cancelled pt's procedure and a new date has not been schedule yet. Pt had last dose of coumadin on Sunday night and pt had a lovenox yesterday morning.      Recommend pt resumes lovenox unless otherwise advised by MD. Will resume warfarin tomorrow per calendar.

## 2022-10-19 NOTE — PROGRESS NOTES
[facility] Consultation Note    Reason for Consult:  The primary encounter diagnosis was Diarrhea in adult patient. Diagnoses of Syncope, Chronic renal impairment, unspecified CKD stage, and Iron deficiency anemia, unspecified iron deficiency anemia type were also pertinent to this visit.    PCP:   Devon Langston   No address on file    Referring MD:  Aaareferral Self  No address on file    Initial History of Present Illness (HPI):  This is a 82 y.o. male here for evaluation of chronic diarrhea patient was admitted to the hospital for dehydration chronic diarrhea had a syncopal episode had a nose bleed that started today had his nose cauterize he is on blood thinners that stopped he has chronic kidney disease and anemia of chronic disease but a recent drop in his H&H his colonoscopy is up-to-date has a history of adenomas colon polyps in her surveillance program never had an EGD before no dysphagia no odynophagia thinks sometimes maybe as heartburn symptoms or reflux symptoms no real dysphagia or odynophagia maybe once every few months if he drinks something cold he will get a spasm of his upper esophageal sphincter but no trouble swallowing food no trouble swallowing liquids he lost a few lb during the episode of his diarrhea they stopped a few of his hypertension meds his diarrhea has resolved he did take Imodium he has not had diarrhea in 2 days though.  No fever no chills no shortness of breath no abdominal pain no blood in his stool no throwing up blood no nausea or vomiting    Abdominal pain - no  Reflux - possible  Dysphagia - no   Bowel habits -as above  GI bleeding - none  NSAID usage - blood    Interval HPI 10/19/2022:  The patient's last visit with me was on Visit date not found.      ROS:  Constitutional: No fevers, chills, No weight loss  ENT:  No heartburn no dysphagia no odynophagia no hoarseness  CV: No chest pain, no palpitation  Pulm: No cough, No shortness of breath, no  wheezing  Ophtho: No vision changes  GI: see HPI  Derm: No rash, no itching  Heme: No lymphadenopathy, No easy bruising  MSK: No significant arthritis  : No dysuria, No hematuria  Endo: No hot or cold intolerance  Neuro: No syncope, No seizure, no strokes  Psych: No uncontrolled anxiety, No uncontrolled depression    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 without angina pectoris (09/11/2012), Bilateral carotid artery disease (02/09/2017), Bleeding from the nose, Bleeding nose (03/21/2018), Cataract, CKD (chronic kidney disease) stage 3, GFR 30-59 ml/min (05/27/2015), Claudication of left lower extremity (09/17/2014), Colon polyp, Encounter for blood transfusion, Gastroesophageal reflux disease without esophagitis (03/19/2018), Gastrointestinal hemorrhage associated with intestinal diverticulosis (04/01/2018), Glaucoma, H/O mechanical aortic valve replacement (09/17/2014), History of gout (09/26/2012), Hyperparathyroidism due to renal insufficiency (07/27/2015), Internal hemorrhoid (04/03/2018), Long term current use of anticoagulant therapy (09/26/2012), Mechanical heart valve present, Metabolic acidosis with normal anion gap and bicarbonate losses (03/20/2018), Mixed hyperlipidemia (09/26/2012), NSTEMI (non-ST elevated myocardial infarction) (03/21/2018), Obesity, diabetes, and hypertension syndrome (02/23/2016), PVD (peripheral vascular disease) (09/11/2012), 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    Surgical History:  has a past surgical history that includes Cardiac valuve replacement; Cardiac catheterization;  Coronary artery bypass graft; Coronary angioplasty; Cardiac valve replacement; Spine surgery; Vasectomy; Colon surgery; Colonoscopy (N/A, 3/31/2017); Hernia repair; Colonoscopy (N/A, 4/3/2018); Colonoscopy (N/A, 8/13/2018); Carpal tunnel release (Right, 5/19/2020); Coronary angiography (N/A, 10/4/2021); and Coronary bypass graft angiography (10/4/2021).    Family History: family history includes Alcohol abuse in his father; Diabetes in his brother; Heart disease in his brother, father, mother, and sister; Heart failure in his brother, father, and mother; No Known Problems in his maternal aunt, maternal grandfather, maternal grandmother, maternal uncle, paternal aunt, paternal grandfather, paternal grandmother, paternal uncle, and sister..     Social History:  reports that he quit smoking about 42 years ago. His smoking use included cigarettes. He has a 20.00 pack-year smoking history. He has never been exposed to tobacco smoke. He has never used smokeless tobacco. He reports that he does not drink alcohol and does not use drugs.    Review of patient's allergies indicates:   Allergen Reactions    Fosinopril      Intolerance- elevates potassium level      Losartan      Intolerance- elevates potassium level       Medication List with Changes/Refills   Current Medications    ALLOPURINOL (ZYLOPRIM) 100 MG TABLET    TAKE 1 TABLET EVERY DAY    AZELASTINE (ASTELIN) 137 MCG (0.1 %) NASAL SPRAY    1 spray by Nasal route 2 (two) times daily as needed for Rhinitis.    BISMUTH SUBSALICYLATE (PEPTO BISMOL) 262 MG/15 ML SUSPENSION    Take 30 mLs by mouth once as needed (Diarrhea).    CLOPIDOGREL (PLAVIX) 75 MG TABLET    Take 1 tablet (75 mg total) by mouth once daily.    ENOXAPARIN (LOVENOX) 80 MG/0.8 ML SYRG    Inject 0.8 mLs (80 mg total) into the skin once daily.    ISOSORBIDE MONONITRATE (IMDUR) 120 MG 24 HR TABLET    Take 1 tablet (120 mg total) by mouth once daily.    LACTOSE-REDUCED FOOD (ENSURE ORAL)    Take 1-2 Cans by  "mouth once daily.    LOPERAMIDE (IMODIUM A-D) 2 MG TAB    Take 2 capsules at onset then 1 capsule as needed for diarrhea.    NITROGLYCERIN (NITROSTAT) 0.4 MG SL TABLET    Place 1 tablet (0.4 mg total) under the tongue every 5 (five) minutes as needed. As needed for chest pain    OMEGA-3 ACID ETHYL ESTERS (LOVAZA) 1 GRAM CAPSULE    Take 2 capsules (2 g total) by mouth 2 (two) times daily.    PREDNISOLON/GATIFLOX/BROMFENAC (PREDNISOL ACE-GATIFLOX-BROMFEN) 1-0.5-0.075 % DRPS    Apply 1 drop to eye 3 (three) times daily. One drop 3 times a day in surgical eye    ROSUVASTATIN (CRESTOR) 40 MG TAB    TAKE 1 TABLET EVERY EVENING.    WARFARIN (COUMADIN) 5 MG TABLET    Take 2 tablets (10mg) by mouth Sunday, then 1.5 tablets (7.5mg) by mouth all other days as instructed by [facility].   Discontinued Medications    BUMETANIDE (BUMEX) 2 MG TABLET    Take 1 tablet (2 mg total) by mouth 2 (two) times daily. Hold until f/u with PCP. May take for up to 3 days in setting of: weight gain of 5 lbs in a week or 3 lbs in one day    CARVEDILOL (COREG) 12.5 MG TABLET    Take 1 tablet (12.5 mg total) by mouth 2 (two) times daily with meals.         Objective Findings:    Vital Signs:  Ht 5' 6" (1.676 m)   Wt 81.3 kg (179 lb 3.7 oz)   BMI 28.93 kg/m²   Body mass index is 28.93 kg/m².    Physical Exam:  General Appearance: Well appearing in no acute distress  Eyes:    No scleral icterus  ENT:  No lesions or masses   Lungs: CTA bilaterally, no wheezes, no rhonchi, no rales  Heart:  S1, S2 normal, no murmurs heard  Abdomen:  Non distended, soft, no guarding, no rebound, no tenderness, no appreciated ascites, no bruits, no hepatosplenomegaly,  No CVA tenderness, no appreciated hernias, no Dove sign, no McBurney point tenderness  Musculoskeletal:  No major joint deformities  Skin: No petechiae or rash on exposed skin areas  Neurologic:  Alert and oriented x4  Psychiatric:  Normal speech mentation and affect    Labs:  Lab Results "   Component Value Date    WBC 5.05 10/18/2022    HGB 8.8 (L) 10/18/2022    HCT 28.4 (L) 10/18/2022     10/18/2022    CHOL 103 (L) 02/10/2022    TRIG 128 02/10/2022    HDL 34 (L) 02/10/2022    ALT 21 10/03/2022    AST 23 10/03/2022     10/18/2022    K 5.7 (H) 10/18/2022     (H) 10/18/2022    CREATININE 1.9 (H) 10/18/2022    BUN 34 (H) 10/18/2022    CO2 20 (L) 10/18/2022    TSH 3.705 09/29/2022    PSA 0.35 07/27/2012    INR 2.0 (H) 10/12/2022    HGBA1C 5.4 10/03/2022               Medical Decision Making:  Lab work reviewed  EGD talk given  Lab work talk given  Stool studies talk given  History obtained from patient family member in the room today    Assessment:  1. Diarrhea in adult patient    2. Syncope    3. Chronic renal impairment, unspecified CKD stage    4. Iron deficiency anemia, unspecified iron deficiency anemia type         Recommendations:  1. Lab work tomorrow  2. Stool studies tomorrow  3. Referral endoscopy schedulers for EGD 2nd floor 1st provider available for evaluation of diarrhea anemia  4. Return GI clinic 4 weeks for follow-up okay for telemedicine video visit    No follow-ups on file.      Order summary:  Orders Placed This Encounter    Clostridium difficile EIA    Stool culture    Strongyloides IgG Antibodies    Anti-Ent. Histolytica Ab    Micropsoridia species, PCR    Cyclospora    Fecal fat, qualitative    Pancreatic elastase, fecal    Giardia / Cryptosporidum, EIA    Stool Exam-Ova,Cysts,Parasites    Iron and TIBC    Ferritin    TISSUE TRANSGLUTAMINASE (TTG), IGA    IgA    Lipase    Ambulatory referral/consult to Endo Procedure          Thank you so much for allowing me to participate in the care of Dariel Ayon MD

## 2022-10-23 ENCOUNTER — HOSPITAL ENCOUNTER (INPATIENT)
Facility: HOSPITAL | Age: 82
LOS: 2 days | Discharge: HOME | DRG: 813 | End: 2022-10-25
Attending: EMERGENCY MEDICINE | Admitting: STUDENT IN AN ORGANIZED HEALTH CARE EDUCATION/TRAINING PROGRAM
Payer: MEDICARE

## 2022-10-23 DIAGNOSIS — R04.0 ACUTE ANTERIOR EPISTAXIS: Primary | ICD-10-CM

## 2022-10-23 DIAGNOSIS — Z79.01 LONG TERM CURRENT USE OF ANTICOAGULANT THERAPY: ICD-10-CM

## 2022-10-23 DIAGNOSIS — Z95.2 H/O MECHANICAL AORTIC VALVE REPLACEMENT: ICD-10-CM

## 2022-10-23 DIAGNOSIS — R07.9 CHEST PAIN: ICD-10-CM

## 2022-10-23 PROBLEM — I50.9 ACUTE ON CHRONIC HEART FAILURE: Status: ACTIVE | Noted: 2022-10-23

## 2022-10-23 LAB
ALBUMIN SERPL BCP-MCNC: 3.4 G/DL (ref 3.5–5.2)
ALP SERPL-CCNC: 73 U/L (ref 55–135)
ALT SERPL W/O P-5'-P-CCNC: 19 U/L (ref 10–44)
ANION GAP SERPL CALC-SCNC: 6 MMOL/L (ref 8–16)
APTT BLDCRRT: 36.2 SEC (ref 21–32)
AST SERPL-CCNC: 22 U/L (ref 10–40)
BACTERIA #/AREA URNS AUTO: NORMAL /HPF
BASOPHILS # BLD AUTO: 0.04 K/UL (ref 0–0.2)
BASOPHILS NFR BLD: 0.6 % (ref 0–1.9)
BILIRUB SERPL-MCNC: 0.5 MG/DL (ref 0.1–1)
BILIRUB UR QL STRIP: NEGATIVE
BNP SERPL-MCNC: 2348 PG/ML (ref 0–99)
BUN SERPL-MCNC: 25 MG/DL (ref 8–23)
CALCIUM SERPL-MCNC: 9.4 MG/DL (ref 8.7–10.5)
CHLORIDE SERPL-SCNC: 112 MMOL/L (ref 95–110)
CLARITY UR REFRACT.AUTO: CLEAR
CO2 SERPL-SCNC: 20 MMOL/L (ref 23–29)
COLOR UR AUTO: YELLOW
CREAT SERPL-MCNC: 1.7 MG/DL (ref 0.5–1.4)
DIFFERENTIAL METHOD: ABNORMAL
EOSINOPHIL # BLD AUTO: 0.1 K/UL (ref 0–0.5)
EOSINOPHIL NFR BLD: 0.8 % (ref 0–8)
ERYTHROCYTE [DISTWIDTH] IN BLOOD BY AUTOMATED COUNT: 16.1 % (ref 11.5–14.5)
EST. GFR  (NO RACE VARIABLE): 40 ML/MIN/1.73 M^2
FACT X PPP CHRO-ACNC: 0.28 IU/ML (ref 0.3–0.7)
GLUCOSE SERPL-MCNC: 107 MG/DL (ref 70–110)
GLUCOSE UR QL STRIP: NEGATIVE
HCT VFR BLD AUTO: 27.7 % (ref 40–54)
HGB BLD-MCNC: 8.9 G/DL (ref 14–18)
HGB UR QL STRIP: ABNORMAL
HYALINE CASTS UR QL AUTO: 0 /LPF
IMM GRANULOCYTES # BLD AUTO: 0.03 K/UL (ref 0–0.04)
IMM GRANULOCYTES NFR BLD AUTO: 0.5 % (ref 0–0.5)
INR PPP: 2 (ref 0.8–1.2)
KETONES UR QL STRIP: NEGATIVE
LEUKOCYTE ESTERASE UR QL STRIP: NEGATIVE
LYMPHOCYTES # BLD AUTO: 0.9 K/UL (ref 1–4.8)
LYMPHOCYTES NFR BLD: 13.9 % (ref 18–48)
MCH RBC QN AUTO: 30.5 PG (ref 27–31)
MCHC RBC AUTO-ENTMCNC: 32.1 G/DL (ref 32–36)
MCV RBC AUTO: 95 FL (ref 82–98)
MICROSCOPIC COMMENT: NORMAL
MONOCYTES # BLD AUTO: 0.7 K/UL (ref 0.3–1)
MONOCYTES NFR BLD: 10.3 % (ref 4–15)
NEUTROPHILS # BLD AUTO: 4.8 K/UL (ref 1.8–7.7)
NEUTROPHILS NFR BLD: 73.9 % (ref 38–73)
NITRITE UR QL STRIP: NEGATIVE
NRBC BLD-RTO: 0 /100 WBC
PH UR STRIP: 6 [PH] (ref 5–8)
PLATELET # BLD AUTO: 158 K/UL (ref 150–450)
PMV BLD AUTO: 10.6 FL (ref 9.2–12.9)
POTASSIUM SERPL-SCNC: 5.1 MMOL/L (ref 3.5–5.1)
PROT SERPL-MCNC: 7.2 G/DL (ref 6–8.4)
PROT UR QL STRIP: ABNORMAL
PROTHROMBIN TIME: 20.3 SEC (ref 9–12.5)
RBC # BLD AUTO: 2.92 M/UL (ref 4.6–6.2)
RBC #/AREA URNS AUTO: 1 /HPF (ref 0–4)
SODIUM SERPL-SCNC: 138 MMOL/L (ref 136–145)
SP GR UR STRIP: 1.01 (ref 1–1.03)
TROPONIN I SERPL DL<=0.01 NG/ML-MCNC: 0.11 NG/ML (ref 0–0.03)
URN SPEC COLLECT METH UR: ABNORMAL
WBC # BLD AUTO: 6.42 K/UL (ref 3.9–12.7)
WBC #/AREA URNS AUTO: 1 /HPF (ref 0–5)

## 2022-10-23 PROCEDURE — 25000003 PHARM REV CODE 250: Performed by: STUDENT IN AN ORGANIZED HEALTH CARE EDUCATION/TRAINING PROGRAM

## 2022-10-23 PROCEDURE — 93005 ELECTROCARDIOGRAM TRACING: CPT

## 2022-10-23 PROCEDURE — 99223 1ST HOSP IP/OBS HIGH 75: CPT | Mod: AI,,, | Performed by: STUDENT IN AN ORGANIZED HEALTH CARE EDUCATION/TRAINING PROGRAM

## 2022-10-23 PROCEDURE — 81001 URINALYSIS AUTO W/SCOPE: CPT

## 2022-10-23 PROCEDURE — 11000001 HC ACUTE MED/SURG PRIVATE ROOM

## 2022-10-23 PROCEDURE — 99291 PR CRITICAL CARE, E/M 30-74 MINUTES: ICD-10-PCS | Mod: ,,, | Performed by: EMERGENCY MEDICINE

## 2022-10-23 PROCEDURE — 93010 ELECTROCARDIOGRAM REPORT: CPT | Mod: ,,, | Performed by: INTERNAL MEDICINE

## 2022-10-23 PROCEDURE — 99291 CRITICAL CARE FIRST HOUR: CPT | Mod: ,,, | Performed by: EMERGENCY MEDICINE

## 2022-10-23 PROCEDURE — 83880 ASSAY OF NATRIURETIC PEPTIDE: CPT

## 2022-10-23 PROCEDURE — 84484 ASSAY OF TROPONIN QUANT: CPT

## 2022-10-23 PROCEDURE — 63600175 PHARM REV CODE 636 W HCPCS

## 2022-10-23 PROCEDURE — 80053 COMPREHEN METABOLIC PANEL: CPT

## 2022-10-23 PROCEDURE — 93010 EKG 12-LEAD: ICD-10-PCS | Mod: ,,, | Performed by: INTERNAL MEDICINE

## 2022-10-23 PROCEDURE — 85520 HEPARIN ASSAY: CPT

## 2022-10-23 PROCEDURE — 63600175 PHARM REV CODE 636 W HCPCS: Performed by: STUDENT IN AN ORGANIZED HEALTH CARE EDUCATION/TRAINING PROGRAM

## 2022-10-23 PROCEDURE — 85610 PROTHROMBIN TIME: CPT

## 2022-10-23 PROCEDURE — 96374 THER/PROPH/DIAG INJ IV PUSH: CPT

## 2022-10-23 PROCEDURE — 85730 THROMBOPLASTIN TIME PARTIAL: CPT

## 2022-10-23 PROCEDURE — 99223 PR INITIAL HOSPITAL CARE,LEVL III: ICD-10-PCS | Mod: AI,,, | Performed by: STUDENT IN AN ORGANIZED HEALTH CARE EDUCATION/TRAINING PROGRAM

## 2022-10-23 PROCEDURE — 99285 EMERGENCY DEPT VISIT HI MDM: CPT | Mod: 25

## 2022-10-23 PROCEDURE — 25000003 PHARM REV CODE 250

## 2022-10-23 PROCEDURE — 85025 COMPLETE CBC W/AUTO DIFF WBC: CPT

## 2022-10-23 RX ORDER — FUROSEMIDE 10 MG/ML
80 INJECTION INTRAMUSCULAR; INTRAVENOUS
Status: COMPLETED | OUTPATIENT
Start: 2022-10-23 | End: 2022-10-23

## 2022-10-23 RX ORDER — ACETAMINOPHEN 325 MG/1
650 TABLET ORAL EVERY 4 HOURS PRN
Status: DISCONTINUED | OUTPATIENT
Start: 2022-10-23 | End: 2022-10-25 | Stop reason: HOSPADM

## 2022-10-23 RX ORDER — ISOSORBIDE MONONITRATE 60 MG/1
120 TABLET, EXTENDED RELEASE ORAL DAILY
Status: DISCONTINUED | OUTPATIENT
Start: 2022-10-24 | End: 2022-10-25 | Stop reason: HOSPADM

## 2022-10-23 RX ORDER — CLOPIDOGREL BISULFATE 75 MG/1
75 TABLET ORAL DAILY
Status: DISCONTINUED | OUTPATIENT
Start: 2022-10-24 | End: 2022-10-25 | Stop reason: HOSPADM

## 2022-10-23 RX ORDER — LOPERAMIDE HYDROCHLORIDE 2 MG/1
2 CAPSULE ORAL 2 TIMES DAILY PRN
Status: DISCONTINUED | OUTPATIENT
Start: 2022-10-23 | End: 2022-10-25 | Stop reason: HOSPADM

## 2022-10-23 RX ORDER — GLUCAGON 1 MG
1 KIT INJECTION
Status: DISCONTINUED | OUTPATIENT
Start: 2022-10-23 | End: 2022-10-25 | Stop reason: HOSPADM

## 2022-10-23 RX ORDER — NALOXONE HCL 0.4 MG/ML
0.02 VIAL (ML) INJECTION
Status: DISCONTINUED | OUTPATIENT
Start: 2022-10-23 | End: 2022-10-25 | Stop reason: HOSPADM

## 2022-10-23 RX ORDER — CARVEDILOL 6.25 MG/1
6.25 TABLET ORAL 2 TIMES DAILY
Status: DISCONTINUED | OUTPATIENT
Start: 2022-10-23 | End: 2022-10-25 | Stop reason: HOSPADM

## 2022-10-23 RX ORDER — FUROSEMIDE 10 MG/ML
80 INJECTION INTRAMUSCULAR; INTRAVENOUS 2 TIMES DAILY
Status: DISCONTINUED | OUTPATIENT
Start: 2022-10-23 | End: 2022-10-24

## 2022-10-23 RX ORDER — IBUPROFEN 200 MG
24 TABLET ORAL
Status: DISCONTINUED | OUTPATIENT
Start: 2022-10-23 | End: 2022-10-25 | Stop reason: HOSPADM

## 2022-10-23 RX ORDER — ATORVASTATIN CALCIUM 40 MG/1
80 TABLET, FILM COATED ORAL NIGHTLY
Status: DISCONTINUED | OUTPATIENT
Start: 2022-10-23 | End: 2022-10-25 | Stop reason: HOSPADM

## 2022-10-23 RX ORDER — SODIUM CHLORIDE 0.9 % (FLUSH) 0.9 %
10 SYRINGE (ML) INJECTION EVERY 12 HOURS PRN
Status: DISCONTINUED | OUTPATIENT
Start: 2022-10-23 | End: 2022-10-25 | Stop reason: HOSPADM

## 2022-10-23 RX ORDER — CALCIUM CARBONATE 200(500)MG
500 TABLET,CHEWABLE ORAL
Status: COMPLETED | OUTPATIENT
Start: 2022-10-23 | End: 2022-10-23

## 2022-10-23 RX ORDER — ALLOPURINOL 100 MG/1
100 TABLET ORAL DAILY
Status: DISCONTINUED | OUTPATIENT
Start: 2022-10-23 | End: 2022-10-25 | Stop reason: HOSPADM

## 2022-10-23 RX ORDER — FUROSEMIDE 10 MG/ML
40 INJECTION INTRAMUSCULAR; INTRAVENOUS
Status: DISCONTINUED | OUTPATIENT
Start: 2022-10-23 | End: 2022-10-23

## 2022-10-23 RX ORDER — IBUPROFEN 200 MG
16 TABLET ORAL
Status: DISCONTINUED | OUTPATIENT
Start: 2022-10-23 | End: 2022-10-25 | Stop reason: HOSPADM

## 2022-10-23 RX ADMIN — FUROSEMIDE 80 MG: 10 INJECTION, SOLUTION INTRAMUSCULAR; INTRAVENOUS at 08:10

## 2022-10-23 RX ADMIN — WARFARIN SODIUM 7.5 MG: 2.5 TABLET ORAL at 08:10

## 2022-10-23 RX ADMIN — FUROSEMIDE 80 MG: 10 INJECTION, SOLUTION INTRAMUSCULAR; INTRAVENOUS at 11:10

## 2022-10-23 RX ADMIN — CARVEDILOL 6.25 MG: 6.25 TABLET, FILM COATED ORAL at 12:10

## 2022-10-23 RX ADMIN — ALLOPURINOL 100 MG: 100 TABLET ORAL at 02:10

## 2022-10-23 RX ADMIN — CALCIUM CARBONATE (ANTACID) CHEW TAB 500 MG 500 MG: 500 CHEW TAB at 09:10

## 2022-10-23 RX ADMIN — CARVEDILOL 6.25 MG: 6.25 TABLET, FILM COATED ORAL at 08:10

## 2022-10-23 RX ADMIN — ATORVASTATIN CALCIUM 80 MG: 40 TABLET, FILM COATED ORAL at 08:10

## 2022-10-23 NOTE — ASSESSMENT & PLAN NOTE
83 yo M w/ hx of long term AC use currently on both warfarin and lovenox who presents to ED for 2 days of bleeding from bilateral nares after having the L septum cauterized on Thursday. Not actively bleeding on evaluation but did not areas of hypervascularity of the L septum, cauterized again in the ED.     - Fibrillar placed in L nare, dissolvable packing no need for removal or abx while in place   - Nasal saline to bilateral nares q 4 hrs   - Afrin nasal spray PRN epistaxis  - Avoid nasal cannula; recommend humidified 02   - Patient recommendations: keep head of bed elevated, no nose blowing, sneeze through an open mouth, avoid straining, treat HTN  - Counseled on management of acute epistaxis if occurs again while at home - use afrin and hold pressure for 15 minutes   - Ok for discharge from ENT standpoint   - Please page with any questions

## 2022-10-23 NOTE — ASSESSMENT & PLAN NOTE
82 y.o. male with CAD s/p CABG, PCI (11/2021), mechanical aortic valve (2014), HFmEF, CVA, CKD III, HTN, who presented with epistaxis.  Reports having had worsening shortness of breath with exertion over the last several weeks, orthopnea and lower extremity edema. BNP 2,348, CXR with faint reticular opacities present bilaterally  Likely secondary to holding bumex  - continue Lasix 80 IV BID  - Strict I/Os, daily weights, Low Na diet

## 2022-10-23 NOTE — PROVIDER PROGRESS NOTES - EMERGENCY DEPT.
Encounter Date: 10/23/2022    ED Physician Progress Notes            ECG, independently reviewed and interpreted by me, reveals a left bundle-branch block similar to prior with no ST changes that meet modified Sgarbossa criteria.

## 2022-10-23 NOTE — HPI
83 yo w/ hx of long term AC use currently being bridged from warfarin to lovenox, who complains of intermittent epistaxis with bleeding both anteriorly and posteriorly into mouth for the last 2 days. States he went to see his ENT on Thursday at which time he had the L side cauterized. Began having bleeding from both the right an left side shortly after that. Has been trying to use cotton balls and pressure to stop bleeding but notices it starts to drain down his throat anytime he tries to hold pressure. Currently denies any active bleeding or the sensation of blood draining down the back of his throat.

## 2022-10-23 NOTE — ED NOTES
Patient made aware of needing urine sample. Sample cup provided at bedside as well as call light to notify RN.

## 2022-10-23 NOTE — H&P
[facility] - Emergency Dept  [facility] Medicine  History & Physical    Patient Name: Dariel Urbina  MRN: [id number]  Patient Class: IP- Inpatient  Admission Date: 10/23/2022  Attending Physician: Oscar Farias*   Primary Care Provider: Devon Langston MD         Patient information was obtained from patient, past medical records and ER records.     Subjective:     Principal Problem:Acute anterior epistaxis    Chief Complaint:   Chief Complaint   Patient presents with    Chest Pain     Worse with ambulation. Cardiac hx. Also reports nose bleed since yesterday. Cauterized on Thursday. On blood thinners. Bleeding controlled in triage.        HPI: Dariel Urbina is a 82 y.o. male with past medical history of CAD s/p CABG, PCI (11/2021), mechanical aortic valve (2014), HFmEF, CVA, CKD III, HTN, who presented with epistaxis. Patient reports long-standing history of episodes of epistaxis. He first developed L sided epistaxis more recently last week and went to clinic with ENT where it was cauterized. Then two days ago he developed bleeding on the right side, followed by profuse recurrent bleeding from the L side yesterday. He then came into the ED. Of note patient was on lovenox bridge for planned aortogram, but this was cancelled when he developed bleeding. Prior to presentation he was taking lovenox shots and coumadin managed by coumadin clinic. He also takes plavix. Patient was also hospitalized 9/29- 10/1 following a syncopal episode in the setting of diarrhea. His diuretics and blood pressure medications were held. He reports having had worsening shortness of breath with exertion over the last several weeks, associated with chest tightness. He has had orthopnea and lower extremity edema. He denies weight gain.  In the ED today, patient was hypertensive but hemodynamically stable. Labs with Hb 8.9, wnc 6.4, plt 158, Cr 1.7, BUN 25, INR 2.0, BNP 2,348, CXR with faint reticular opacities present  bilaterally which could be due to mild interstitial edema or less likely an infectious process. He was given Lasix. Patient was evaluated by ENT in ED and cauterized. Patient was admitted to hospital medicine. On time of evaluation patient denied further epistaxis.       Past Medical History:   Diagnosis Date    Anemia of chronic renal failure, stage 3 (moderate) 05/27/2015    Anticoagulant long-term use     Atherosclerosis of coronary artery bypass graft of native heart without angina pectoris 09/11/2012    3-27-18 [facility] Two vessel coronary artery disease.   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    Bilateral carotid artery disease 02/09/2017    Bleeding from the nose     Bleeding nose 03/21/2018    Cataract     CKD (chronic kidney disease) stage 3, GFR 30-59 ml/min 05/27/2015    Claudication of left lower extremity 09/17/2014    Colon polyp     Encounter for blood transfusion     Gastroesophageal reflux disease without esophagitis 03/19/2018    Gastrointestinal hemorrhage associated with intestinal diverticulosis 04/01/2018    Glaucoma     H/O mechanical aortic valve replacement 09/17/2014    History of gout 09/26/2012    Hyperparathyroidism due to renal insufficiency 07/27/2015    Internal hemorrhoid 04/03/2018    Long term current use of anticoagulant therapy 09/26/2012    Mechanical heart valve present     Metabolic acidosis with normal anion gap and bicarbonate losses 03/20/2018    Mixed hyperlipidemia 09/26/2012    NSTEMI (non-ST elevated myocardial infarction) 03/21/2018    Obesity, diabetes, and hypertension syndrome 02/23/2016    PVD (peripheral vascular disease) 09/11/2012    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    Syncope 9/29/2022    Type 2 diabetes mellitus with diabetic peripheral angiopathy without gangrene 05/27/2015    Type 2 diabetes mellitus with stage 3 chronic kidney disease, without long-term current use of insulin 10/02/2013       Past  Surgical History:   Procedure Laterality Date    CARDIAC CATHETERIZATION      CARDIAC VALVE REPLACEMENT      CARDIAC VALVE SURGERY      CARPAL TUNNEL RELEASE Right 5/19/2020    Procedure: RELEASE, CARPAL TUNNEL;  Surgeon: Rupesh Norris Jr., MD;  Location[facility];  Service: Plastics;  Laterality: Right;    COLON SURGERY      COLONOSCOPY N/A 3/31/2017    Procedure: COLONOSCOPY;  Surgeon: Bruno Raymond MD;  Location: [facility] (4TH FLR);  Service: Endoscopy;  Laterality: N/A;  Patient's wife requesting date.    COLONOSCOPY N/A 4/3/2018    Procedure: COLONOSCOPY;  Surgeon: Bonifacio Pelletier MD;  Location: [facility] ENDO (2ND FLR);  Service: Endoscopy;  Laterality: N/A;    COLONOSCOPY N/A 8/13/2018    Procedure: COLONOSCOPY;  Surgeon: Kam Barba MD;  Location: [facility] ENDO (2ND FLR);  Service: Endoscopy;  Laterality: N/A;  2nd floor: PA pressure 49; hx of moderate-severe valve disease     per Coumadin clinic-Patient can hold 5 days with lovenox bridge       ok to schedule per Katarina    CORONARY ANGIOGRAPHY N/A 10/4/2021    Procedure: Left heart cath +/- peripheral angiogram;  Surgeon: Jose Ruiz MD;  Location: [facility] CATH LAB;  Service: Cardiology;  Laterality: N/A;    CORONARY ANGIOPLASTY      CORONARY ARTERY BYPASS GRAFT      CORONARY BYPASS GRAFT ANGIOGRAPHY  10/4/2021    Procedure: Bypass graft study;  Surgeon: Jose Ruiz MD;  Location: [facility] CATH LAB;  Service: Cardiology;;    HERNIA REPAIR      SPINE SURGERY      VASECTOMY         Review of patient's allergies indicates:   Allergen Reactions    Fosinopril      Intolerance- elevates potassium level      Losartan      Intolerance- elevates potassium level       No current facility-administered medications on file prior to encounter.     Current Outpatient Medications on File Prior to Encounter   Medication Sig    allopurinoL (ZYLOPRIM) 100 MG tablet TAKE 1 TABLET EVERY DAY    azelastine (ASTELIN) 137 mcg (0.1 %) nasal spray 1 spray by  Nasal route 2 (two) times daily as needed for Rhinitis.    clopidogreL (PLAVIX) 75 mg tablet Take 1 tablet (75 mg total) by mouth once daily.    enoxaparin (LOVENOX) 80 mg/0.8 mL Syrg Inject 0.8 mLs (80 mg total) into the skin once daily.    isosorbide mononitrate (IMDUR) 120 MG 24 hr tablet Take 1 tablet (120 mg total) by mouth once daily.    nitroGLYCERIN (NITROSTAT) 0.4 MG SL tablet Place 1 tablet (0.4 mg total) under the tongue every 5 (five) minutes as needed. As needed for chest pain    omega-3 acid ethyl esters (LOVAZA) 1 gram capsule Take 2 capsules (2 g total) by mouth 2 (two) times daily. (Patient taking differently: Take 1 g by mouth once daily.)    rosuvastatin (CRESTOR) 40 MG Tab TAKE 1 TABLET EVERY EVENING. (Patient taking differently: Take 40 mg by mouth every evening.)    warfarin (COUMADIN) 5 MG tablet Take 2 tablets (10mg) by mouth Sunday, then 1.5 tablets (7.5mg) by mouth all other days as instructed by [facility] Clinic. (Patient taking differently: Take 1 tablet (5 mg) by mouth Monday, Tuesday, Wednesday, Friday & Saturday then 1.5 tablets (7.5mg) by mouth all other days (Thurs & Sun) as instructed by Coumadin Clinic.)    bismuth subsalicylate (PEPTO BISMOL) 262 mg/15 mL suspension Take 30 mLs by mouth once as needed (Diarrhea).    lactose-reduced food (ENSURE ORAL) Take 1-2 Cans by mouth once daily.    loperamide (IMODIUM A-D) 2 mg Tab Take 2 capsules at onset then 1 capsule as needed for diarrhea.    prednisolon/gatiflox/bromfenac (PREDNISOL ACE-GATIFLOX-BROMFEN) 1-0.5-0.075 % DrpS Apply 1 drop to eye 3 (three) times daily. One drop 3 times a day in surgical eye     Family History       Problem Relation (Age of Onset)    Alcohol abuse Father    Diabetes Brother    Heart disease Mother, Father, Brother, Sister    Heart failure Mother, Father, Brother    No Known Problems Sister, Maternal Grandmother, Maternal Grandfather, Paternal Grandmother, Paternal Grandfather, Maternal Aunt,  Maternal Uncle, Paternal Aunt, Paternal Uncle          Tobacco Use    Smoking status: Former     Packs/day: 1.00     Years: 20.00     Pack years: 20.00     Types: Cigarettes     Quit date: 1980     Years since quittin.1     Passive exposure: Never    Smokeless tobacco: Never   Substance and Sexual Activity    Alcohol use: No    Drug use: No    Sexual activity: Not Currently     Review of Systems   Constitutional:  Negative for chills and fever.   HENT:  Positive for nosebleeds. Negative for sore throat and trouble swallowing.    Eyes:  Negative for photophobia and visual disturbance.   Respiratory:  Positive for shortness of breath. Negative for cough.    Cardiovascular:  Positive for leg swelling. Negative for chest pain.   Gastrointestinal:  Negative for abdominal pain, diarrhea, nausea and vomiting.   Genitourinary:  Negative for dysuria, frequency and urgency.   Musculoskeletal:  Negative for arthralgias and myalgias.   Neurological:  Negative for dizziness, syncope and light-headedness.   Psychiatric/Behavioral:  Negative for agitation and confusion.    Objective:     Vital Signs (Most Recent):  Temp: 98.7 °F (37.1 °C) (10/23/22 0748)  Pulse: 62 (10/23/22 1323)  Resp: 16 (10/23/22 1323)  BP: (!) 164/73 (10/23/22 1323)  SpO2: 96 % (10/23/22 1301)   Vital Signs (24h Range):  Temp:  [98.7 °F (37.1 °C)] 98.7 °F (37.1 °C)  Pulse:  [60-69] 62  Resp:  [16-19] 16  SpO2:  [94 %-98 %] 96 %  BP: (164-193)/(69-83) 164/73     Weight: 78.5 kg (173 lb)  Body mass index is 27.92 kg/m².    Physical Exam  Constitutional:       General: He is not in acute distress.     Appearance: Normal appearance. He is not toxic-appearing or diaphoretic.   HENT:      Head: Normocephalic and atraumatic.   Eyes:      Extraocular Movements: Extraocular movements intact.   Cardiovascular:      Rate and Rhythm: Normal rate and regular rhythm.      Heart sounds: No murmur heard.    No friction rub. No gallop.   Pulmonary:      Effort:  Pulmonary effort is normal. No respiratory distress.      Breath sounds: No wheezing or rhonchi.   Abdominal:      General: Abdomen is flat. There is no distension.      Palpations: Abdomen is soft.      Tenderness: There is no abdominal tenderness. There is no guarding or rebound.   Musculoskeletal:      Cervical back: Normal range of motion and neck supple.      Right lower leg: Edema present.      Left lower leg: Edema present.   Skin:     General: Skin is warm and dry.   Neurological:      Mental Status: He is alert.           Significant Labs: All pertinent labs within the past 24 hours have been reviewed.    Significant Imaging: I have reviewed all pertinent imaging results/findings within the past 24 hours.    Assessment/Plan:     Acute on chronic heart failure   82 y.o. male with CAD s/p CABG, PCI (11/2021), mechanical aortic valve (2014), HFmEF, CVA, CKD III, HTN, who presented with epistaxis.  Reports having had worsening shortness of breath with exertion over the last several weeks, orthopnea and lower extremity edema. BNP 2,348, CXR with faint reticular opacities present bilaterally  Likely secondary to holding bumex  - continue Lasix 80 IV BID  - Strict I/Os, daily weights, Low Na diet    Acute anterior epistaxis  ENT consulted, appreciate assistance  - Fibrillar placed in L nare, dissolvable packing no need for removal or abx while in place   - Nasal saline to bilateral nares q 4 hrs   - Afrin nasal spray PRN epistaxis  - Avoid nasal cannula; recommend humidified 02   - Patient recommendations: keep head of bed elevated, no nose blowing, sneeze through an open mouth, avoid straining, treat HTN  - Counseled on management of acute epistaxis if occurs again while at home - use afrin and hold pressure for 15 minutes       H/O mechanical aortic valve replacement  Long-term use of anticoagulation  Anemia  Resume warfarin  Pharmacy consulted to assist with dosing    History of gout  Continue  allopurinol      Renovascular hypertension  Continue imdur  Cautious resumption of coreg  Patient was also previously taking lisinopril before dc several weeks ago in setting of diarrhea/syncope      Hyperlipidemia associated with type 2 diabetes mellitus  Continue statin      VTE Risk Mitigation (From admission, onward)         Ordered     warfarin tablet 7.5 mg  Daily         10/23/22 1343     IP VTE HIGH RISK PATIENT  Once         10/23/22 1343     Place sequential compression device  Until discontinued         10/23/22 1343                   Oscar Herring MD  Department of Hospital Medicine   [facility] - Emergency Dept

## 2022-10-23 NOTE — ASSESSMENT & PLAN NOTE
ENT consulted, appreciate assistance  - Fibrillar placed in L nare, dissolvable packing no need for removal or abx while in place   - Nasal saline to bilateral nares q 4 hrs   - Afrin nasal spray PRN epistaxis  - Avoid nasal cannula; recommend humidified 02   - Patient recommendations: keep head of bed elevated, no nose blowing, sneeze through an open mouth, avoid straining, treat HTN  - Counseled on management of acute epistaxis if occurs again while at home - use afrin and hold pressure for 15 minutes

## 2022-10-23 NOTE — CONSULTS
[facility] - Emergency Dept  Otorhinolaryngology-Head & Neck Surgery  Consult Note    Patient Name: Dariel Urbina  MRN: [id number]  Code Status: Prior  Admission Date: 10/23/2022  Hospital Length of Stay: 0 days  Attending Physician: Oscar Farias*  Primary Care Provider: Devon Langston MD    Patient information was obtained from patient.     Inpatient consult to ENT  Consult performed by: Keira Mcmillan MD  Consult ordered by: Lexii Louis MD      Subjective:     Chief Complaint/Reason for Admission: Epistaxis     History of Present Illness: 83 yo w/ hx of long term AC use currently being bridged from warfarin to lovenox, who complains of intermittent epistaxis with bleeding both anteriorly and posteriorly into mouth for the last 2 days. States he went to see his ENT on Thursday at which time he had the L side cauterized. Began having bleeding from both the right an left side shortly after that. Has been trying to use cotton balls and pressure to stop bleeding but notices it starts to drain down his throat anytime he tries to hold pressure. Currently denies any active bleeding or the sensation of blood draining down the back of his throat.       Medications:  Continuous Infusions:  Scheduled Meds:  PRN Meds:     No current facility-administered medications on file prior to encounter.     Current Outpatient Medications on File Prior to Encounter   Medication Sig    allopurinoL (ZYLOPRIM) 100 MG tablet TAKE 1 TABLET EVERY DAY    azelastine (ASTELIN) 137 mcg (0.1 %) nasal spray 1 spray by Nasal route 2 (two) times daily as needed for Rhinitis.    clopidogreL (PLAVIX) 75 mg tablet Take 1 tablet (75 mg total) by mouth once daily.    enoxaparin (LOVENOX) 80 mg/0.8 mL Syrg Inject 0.8 mLs (80 mg total) into the skin once daily.    isosorbide mononitrate (IMDUR) 120 MG 24 hr tablet Take 1 tablet (120 mg total) by mouth once daily.    nitroGLYCERIN (NITROSTAT) 0.4 MG SL tablet Place 1 tablet (0.4 mg total)  under the tongue every 5 (five) minutes as needed. As needed for chest pain    omega-3 acid ethyl esters (LOVAZA) 1 gram capsule Take 2 capsules (2 g total) by mouth 2 (two) times daily. (Patient taking differently: Take 1 g by mouth once daily.)    rosuvastatin (CRESTOR) 40 MG Tab TAKE 1 TABLET EVERY EVENING. (Patient taking differently: Take 40 mg by mouth every evening.)    warfarin (COUMADIN) 5 MG tablet Take 2 tablets (10mg) by mouth Sunday, then 1.5 tablets (7.5mg) by mouth all other days as instructed by [facility] Clinic. (Patient taking differently: Take 1 tablet (5 mg) by mouth Monday, Tuesday, Wednesday, Friday & Saturday then 1.5 tablets (7.5mg) by mouth all other days (Thurs & Sun) as instructed by [facility]madin Clinic.)    bismuth subsalicylate (PEPTO BISMOL) 262 mg/15 mL suspension Take 30 mLs by mouth once as needed (Diarrhea).    lactose-reduced food (ENSURE ORAL) Take 1-2 Cans by mouth once daily.    loperamide (IMODIUM A-D) 2 mg Tab Take 2 capsules at onset then 1 capsule as needed for diarrhea.    prednisolon/gatiflox/bromfenac (PREDNISOL ACE-GATIFLOX-BROMFEN) 1-0.5-0.075 % DrpS Apply 1 drop to eye 3 (three) times daily. One drop 3 times a day in surgical eye       Review of patient's allergies indicates:   Allergen Reactions    Fosinopril      Intolerance- elevates potassium level      Losartan      Intolerance- elevates potassium level       Past Medical History:   Diagnosis Date    Anemia of chronic renal failure, stage 3 (moderate) 05/27/2015    Anticoagulant long-term use     Atherosclerosis of coronary artery bypass graft of native heart without angina pectoris 09/11/2012    3-27-18 [facility] Two vessel coronary artery disease.   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    Bilateral carotid artery disease 02/09/2017    Bleeding from the nose     Bleeding nose 03/21/2018    Cataract     CKD (chronic kidney disease) stage 3, GFR 30-59 ml/min 05/27/2015    Claudication of left lower  extremity 09/17/2014    Colon polyp     Encounter for blood transfusion     Gastroesophageal reflux disease without esophagitis 03/19/2018    Gastrointestinal hemorrhage associated with intestinal diverticulosis 04/01/2018    Glaucoma     H/O mechanical aortic valve replacement 09/17/2014    History of gout 09/26/2012    Hyperparathyroidism due to renal insufficiency 07/27/2015    Internal hemorrhoid 04/03/2018    Long term current use of anticoagulant therapy 09/26/2012    Mechanical heart valve present     Metabolic acidosis with normal anion gap and bicarbonate losses 03/20/2018    Mixed hyperlipidemia 09/26/2012    NSTEMI (non-ST elevated myocardial infarction) 03/21/2018    Obesity, diabetes, and hypertension syndrome 02/23/2016    PVD (peripheral vascular disease) 09/11/2012    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    Syncope 9/29/2022    Type 2 diabetes mellitus with diabetic peripheral angiopathy without gangrene 05/27/2015    Type 2 diabetes mellitus with stage 3 chronic kidney disease, without long-term current use of insulin 10/02/2013     Past Surgical History:   Procedure Laterality Date    CARDIAC CATHETERIZATION      CARDIAC VALVE REPLACEMENT      CARDIAC VALVE SURGERY      CARPAL TUNNEL RELEASE Right 5/19/2020    Procedure: RELEASE, CARPAL TUNNEL;  Surgeon: Rupesh Norris Jr., MD;  Location: [facility];  Service: Plastics;  Laterality: Right;    COLON SURGERY      COLONOSCOPY N/A 3/31/2017    Procedure: COLONOSCOPY;  Surgeon: Bruno Raymond MD;  Location: [facility] (4TH FLR);  Service: Endoscopy;  Laterality: N/A;  Patient's wife requesting date.    COLONOSCOPY N/A 4/3/2018    Procedure: COLONOSCOPY;  Surgeon: Bonifacio Pelletier MD;  Location: [facility] (2ND FLR);  Service: Endoscopy;  Laterality: N/A;    COLONOSCOPY N/A 8/13/2018    Procedure: COLONOSCOPY;  Surgeon: Kam Barba MD;  Location: [facility] ENDO (2ND FLR);  Service: Endoscopy;  Laterality: N/A;  2nd floor: PA pressure 49; hx of  moderate-severe valve disease     per Coumadin clinic-Patient can hold 5 days with lovenox bridge       ok to schedule per Katarina    CORONARY ANGIOGRAPHY N/A 10/4/2021    Procedure: Left heart cath +/- peripheral angiogram;  Surgeon: Jose Ruiz MD;  Location: [facility] CATH LAB;  Service: Cardiology;  Laterality: N/A;    CORONARY ANGIOPLASTY      CORONARY ARTERY BYPASS GRAFT      CORONARY BYPASS GRAFT ANGIOGRAPHY  10/4/2021    Procedure: Bypass graft study;  Surgeon: Jose Ruiz MD;  Location: [facility] CATH LAB;  Service: Cardiology;;    HERNIA REPAIR      SPINE SURGERY      VASECTOMY       Family History       Problem Relation (Age of Onset)    Alcohol abuse Father    Diabetes Brother    Heart disease Mother, Father, Brother, Sister    Heart failure Mother, Father, Brother    No Known Problems Sister, Maternal Grandmother, Maternal Grandfather, Paternal Grandmother, Paternal Grandfather, Maternal Aunt, Maternal Uncle, Paternal Aunt, Paternal Uncle          Tobacco Use    Smoking status: Former     Packs/day: 1.00     Years: 20.00     Pack years: 20.00     Types: Cigarettes     Quit date: 1980     Years since quittin.1     Passive exposure: Never    Smokeless tobacco: Never   Substance and Sexual Activity    Alcohol use: No    Drug use: No    Sexual activity: Not Currently     Review of Systems   Constitutional:  Negative for activity change, appetite change, fatigue and fever.   HENT:  Positive for nosebleeds. Negative for congestion, drooling, ear pain, facial swelling, sore throat and tinnitus.    Objective:     Vital Signs (Most Recent):  Temp: 98.7 °F (37.1 °C) (10/23/22 0748)  Pulse: 60 (10/23/22 1057)  Resp: 16 (10/23/22 1057)  BP: (!) 169/73 (10/23/22 1057)  SpO2: (!) 94 % (10/23/22 1057)   Vital Signs (24h Range):  Temp:  [98.7 °F (37.1 °C)] 98.7 °F (37.1 °C)  Pulse:  [60-61] 60  Resp:  [16] 16  SpO2:  [94 %-95 %] 94 %  BP: (168-169)/(70-73) 169/73     Weight: 78.5 kg (173 lb)  Body mass index is  27.92 kg/m².        Physical Exam  Constitutional:       General: He is not in acute distress.     Appearance: Normal appearance. He is not ill-appearing or toxic-appearing.   HENT:      Head: Normocephalic and atraumatic.      Right Ear: External ear normal.      Left Ear: External ear normal.      Nose:      Right Nostril: No epistaxis (Bloody cotton ball removed from nare. No areas of hypervascularity on the septum. No active bleeding noted.).      Left Nostril: No epistaxis (Site of hypervacularity on the anterior and posterior septum. No active bleeding noted at this time. Crusting along the septum with dried blood on the nasal yanira).      Mouth/Throat:      Mouth: Mucous membranes are moist.      Pharynx: Oropharynx is clear.      Comments: No active bleeding seen in the OP  Neurological:      Mental Status: He is alert.       Significant Labs:  CBC:   Recent Labs   Lab 10/23/22  0922   WBC 6.42   RBC 2.92*   HGB 8.9*   HCT 27.7*      MCV 95   MCH 30.5   MCHC 32.1       Significant Diagnostics:  None      Procedure:  AgNO3 Cauterization of the Nose     After verbal consent was obtained, 1 stick of AgNO3 was used to cauterized an area of hypervascularity on the L anterior septum an another site slightly more posterior on the L septum. The fibrillar was then placed adjacent to the sites of cauterization. Patient tolerate the procedure well.     Assessment/Plan:     Acute anterior epistaxis  81 yo M w/ hx of long term AC use currently on both warfarin and lovenox who presents to ED for 2 days of bleeding from bilateral nares after having the L septum cauterized on Thursday. Not actively bleeding on evaluation but did not areas of hypervascularity of the L septum, cauterized again in the ED.     - Fibrillar placed in L nare, dissolvable packing no need for removal or abx while in place   - Nasal saline to bilateral nares q 4 hrs   - Afrin nasal spray PRN epistaxis  - Avoid nasal cannula; recommend humidified  02   - Patient recommendations: keep head of bed elevated, no nose blowing, sneeze through an open mouth, avoid straining, treat HTN  - Counseled on management of acute epistaxis if occurs again while at home - use afrin and hold pressure for 15 minutes   - Ok for discharge from ENT standpoint   - Please page with any questions            VTE Risk Mitigation (From admission, onward)      None            Thank you for your consult. I will sign off. Please contact us if you have any additional questions.    Keira Mcmillan MD  Otorhinolaryngology-Head & Neck Surgery  [facility] - Emergency Dept

## 2022-10-23 NOTE — ASSESSMENT & PLAN NOTE
Continue imdur  Cautious resumption of coreg  Patient was also previously taking lisinopril before dc several weeks ago in setting of diarrhea/syncope

## 2022-10-23 NOTE — HPI
Dariel Urbina is a 82 y.o. male with past medical history of CAD s/p CABG, PCI (11/2021), mechanical aortic valve (2014), HFmEF, CVA, CKD III, HTN, who presented with epistaxis. Patient reports long-standing history of episodes of epistaxis. He first developed L sided epistaxis more recently last week and went to clinic with ENT where it was cauterized. Then two days ago he developed bleeding on the right side, followed by profuse recurrent bleeding from the L side yesterday. He then came into the ED. Of note patient was on lovenox bridge for planned aortogram, but this was cancelled when he developed bleeding. Prior to presentation he was taking lovenox shots and coumadin managed by coumadin clinic. He also takes plavix. Patient was also hospitalized 9/29- 10/1 following a syncopal episode in the setting of diarrhea. His diuretics and blood pressure medications were held. He reports having had worsening shortness of breath with exertion over the last several weeks, associated with chest tightness. He has had orthopnea and lower extremity edema. He denies weight gain.

## 2022-10-23 NOTE — ASSESSMENT & PLAN NOTE
Long-term use of anticoagulation  Anemia  Resume warfarin  Pharmacy consulted to assist with dosing

## 2022-10-23 NOTE — SUBJECTIVE & OBJECTIVE
Medications:  Continuous Infusions:  Scheduled Meds:  PRN Meds:     No current facility-administered medications on file prior to encounter.     Current Outpatient Medications on File Prior to Encounter   Medication Sig    allopurinoL (ZYLOPRIM) 100 MG tablet TAKE 1 TABLET EVERY DAY    azelastine (ASTELIN) 137 mcg (0.1 %) nasal spray 1 spray by Nasal route 2 (two) times daily as needed for Rhinitis.    clopidogreL (PLAVIX) 75 mg tablet Take 1 tablet (75 mg total) by mouth once daily.    enoxaparin (LOVENOX) 80 mg/0.8 mL Syrg Inject 0.8 mLs (80 mg total) into the skin once daily.    isosorbide mononitrate (IMDUR) 120 MG 24 hr tablet Take 1 tablet (120 mg total) by mouth once daily.    nitroGLYCERIN (NITROSTAT) 0.4 MG SL tablet Place 1 tablet (0.4 mg total) under the tongue every 5 (five) minutes as needed. As needed for chest pain    omega-3 acid ethyl esters (LOVAZA) 1 gram capsule Take 2 capsules (2 g total) by mouth 2 (two) times daily. (Patient taking differently: Take 1 g by mouth once daily.)    rosuvastatin (CRESTOR) 40 MG Tab TAKE 1 TABLET EVERY EVENING. (Patient taking differently: Take 40 mg by mouth every evening.)    warfarin (COUMADIN) 5 MG tablet Take 2 tablets (10mg) by mouth Sunday, then 1.5 tablets (7.5mg) by mouth all other days as instructed by [facility]. (Patient taking differently: Take 1 tablet (5 mg) by mouth Monday, Tuesday, Wednesday, Friday & Saturday then 1.5 tablets (7.5mg) by mouth all other days (Thurs & Sun) as instructed by [facility] Clinic.)    bismuth subsalicylate (PEPTO BISMOL) 262 mg/15 mL suspension Take 30 mLs by mouth once as needed (Diarrhea).    lactose-reduced food (ENSURE ORAL) Take 1-2 Cans by mouth once daily.    loperamide (IMODIUM A-D) 2 mg Tab Take 2 capsules at onset then 1 capsule as needed for diarrhea.    prednisolon/gatiflox/bromfenac (PREDNISOL ACE-GATIFLOX-BROMFEN) 1-0.5-0.075 % DrpS Apply 1 drop to eye 3 (three) times daily. One drop 3 times a day in  surgical eye       Review of patient's allergies indicates:   Allergen Reactions    Fosinopril      Intolerance- elevates potassium level      Losartan      Intolerance- elevates potassium level       Past Medical History:   Diagnosis Date    Anemia of chronic renal failure, stage 3 (moderate) 05/27/2015    Anticoagulant long-term use     Atherosclerosis of coronary artery bypass graft of native heart without angina pectoris 09/11/2012    3-27-18 [facility] Two vessel coronary artery disease.   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    Bilateral carotid artery disease 02/09/2017    Bleeding from the nose     Bleeding nose 03/21/2018    Cataract     CKD (chronic kidney disease) stage 3, GFR 30-59 ml/min 05/27/2015    Claudication of left lower extremity 09/17/2014    Colon polyp     Encounter for blood transfusion     Gastroesophageal reflux disease without esophagitis 03/19/2018    Gastrointestinal hemorrhage associated with intestinal diverticulosis 04/01/2018    Glaucoma     H/O mechanical aortic valve replacement 09/17/2014    History of gout 09/26/2012    Hyperparathyroidism due to renal insufficiency 07/27/2015    Internal hemorrhoid 04/03/2018    Long term current use of anticoagulant therapy 09/26/2012    Mechanical heart valve present     Metabolic acidosis with normal anion gap and bicarbonate losses 03/20/2018    Mixed hyperlipidemia 09/26/2012    NSTEMI (non-ST elevated myocardial infarction) 03/21/2018    Obesity, diabetes, and hypertension syndrome 02/23/2016    PVD (peripheral vascular disease) 09/11/2012    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    Syncope 9/29/2022    Type 2 diabetes mellitus with diabetic peripheral angiopathy without gangrene 05/27/2015    Type 2 diabetes mellitus with stage 3 chronic kidney disease, without long-term current use of insulin 10/02/2013     Past Surgical History:   Procedure Laterality Date    CARDIAC CATHETERIZATION      CARDIAC VALVE REPLACEMENT       CARDIAC VALVE SURGERY      CARPAL TUNNEL RELEASE Right 2020    Procedure: RELEASE, CARPAL TUNNEL;  Surgeon: Rupesh Norris Jr., MD;  Location: [facility];  Service: Plastics;  Laterality: Right;    COLON SURGERY      COLONOSCOPY N/A 3/31/2017    Procedure: COLONOSCOPY;  Surgeon: Bruno Raymond MD;  Location: [facility] (4TH FLR);  Service: Endoscopy;  Laterality: N/A;  Patient's wife requesting date.    COLONOSCOPY N/A 4/3/2018    Procedure: COLONOSCOPY;  Surgeon: Bonifacio Pelletier MD;  Location: [facility] (2ND FLR);  Service: Endoscopy;  Laterality: N/A;    COLONOSCOPY N/A 2018    Procedure: COLONOSCOPY;  Surgeon: Kam Barba MD;  Location: [facility] (2ND FLR);  Service: Endoscopy;  Laterality: N/A;  2nd floor: PA pressure 49; hx of moderate-severe valve disease     per Coumadin clinic-Patient can hold 5 days with lovenox bridge       ok to schedule per Katarina    CORONARY ANGIOGRAPHY N/A 10/4/2021    Procedure: Left heart cath +/- peripheral angiogram;  Surgeon: Jose Ruiz MD;  Location: [facility] CATH [facility];  Service: Cardiology;  Laterality: N/A;    CORONARY ANGIOPLASTY      CORONARY ARTERY BYPASS GRAFT      CORONARY BYPASS GRAFT ANGIOGRAPHY  10/4/2021    Procedure: Bypass graft study;  Surgeon: Jose Ruiz MD;  Location: [facility] CATH LAB;  Service: Cardiology;;    HERNIA REPAIR      SPINE SURGERY      VASECTOMY       Family History       Problem Relation (Age of Onset)    Alcohol abuse Father    Diabetes Brother    Heart disease Mother, Father, Brother, Sister    Heart failure Mother, Father, Brother    No Known Problems Sister, Maternal Grandmother, Maternal Grandfather, Paternal Grandmother, Paternal Grandfather, Maternal Aunt, Maternal Uncle, Paternal Aunt, Paternal Uncle          Tobacco Use    Smoking status: Former     Packs/day: 1.00     Years: 20.00     Pack years: 20.00     Types: Cigarettes     Quit date: 1980     Years since quittin.1     Passive exposure: Never    Smokeless  tobacco: Never   Substance and Sexual Activity    Alcohol use: No    Drug use: No    Sexual activity: Not Currently     Review of Systems   Constitutional:  Negative for activity change, appetite change, fatigue and fever.   HENT:  Positive for nosebleeds. Negative for congestion, drooling, ear pain, facial swelling, sore throat and tinnitus.    Objective:     Vital Signs (Most Recent):  Temp: 98.7 °F (37.1 °C) (10/23/22 0748)  Pulse: 60 (10/23/22 1057)  Resp: 16 (10/23/22 1057)  BP: (!) 169/73 (10/23/22 1057)  SpO2: (!) 94 % (10/23/22 1057)   Vital Signs (24h Range):  Temp:  [98.7 °F (37.1 °C)] 98.7 °F (37.1 °C)  Pulse:  [60-61] 60  Resp:  [16] 16  SpO2:  [94 %-95 %] 94 %  BP: (168-169)/(70-73) 169/73     Weight: 78.5 kg (173 lb)  Body mass index is 27.92 kg/m².        Physical Exam  Constitutional:       General: He is not in acute distress.     Appearance: Normal appearance. He is not ill-appearing or toxic-appearing.   HENT:      Head: Normocephalic and atraumatic.      Right Ear: External ear normal.      Left Ear: External ear normal.      Nose:      Right Nostril: No epistaxis (Bloody cotton ball removed from nare. No areas of hypervascularity on the septum. No active bleeding noted.).      Left Nostril: No epistaxis (Site of hypervacularity on the anterior and posterior septum. No active bleeding noted at this time. Crusting along the septum with dried blood on the nasal yanira).      Mouth/Throat:      Mouth: Mucous membranes are moist.      Pharynx: Oropharynx is clear.      Comments: No active bleeding seen in the OP  Neurological:      Mental Status: He is alert.       Significant Labs:  CBC:   Recent Labs   Lab 10/23/22  0922   WBC 6.42   RBC 2.92*   HGB 8.9*   HCT 27.7*      MCV 95   MCH 30.5   MCHC 32.1       Significant Diagnostics:  None      Procedure:  AgNO3 Cauterization of the Nose     After verbal consent was obtained, 1 stick of AgNO3 was used to cauterized an area of hypervascularity on  the L anterior septum an another site slightly more posterior on the L septum. The fibrillar was then placed adjacent to the sites of cauterization. Patient tolerate the procedure well.

## 2022-10-23 NOTE — ED NOTES
Dariel Urbina, an 82 y.o. male presents to the ED complaining of chest pain that he describes as indigestion sensation. Also endorses epistaxis that has been ongoing since yesterday that was recently cauterized left nare on Thursday. Both nares were bleeding all night he states. Has cotton balls in the nose. Pt on Lovenox and Warfarin. Endorses intermittent cough. Did take nitro at home with no relief. Did not take any TUMs, Mylanta, etc. Denies pain.      LOC: The patient is awake, alert and aware of environment with an appropriate affect, the patient is oriented x 3 and speaking appropriately.   APPEARANCE: Patient appears comfortable and in no acute distress, patient is clean and well groomed.  SKIN: The skin is warm and dry, color consistent with ethnicity.   MUSCULOSKELETAL: Patient moving all extremities spontaneously, no swelling noted.  RESPIRATORY: Airway is open and patent, respirations are spontaneous, patient has a normal effort and rate, no accessory muscle use noted.  CARDIAC: Patient has a normal rate and regular rhythm, no edema noted, capillary refill < 3 seconds. + chest pain that is described as burning.  GASTRO: Soft and non tender to palpation, no distention noted.   : Pt denies any pain or frequency with urination.  NEURO: Pt opens eyes spontaneously, behavior appropriate to situation, follows commands.  HEENT: + Bilateral nare epistaxis.      Chief Complaint   Patient presents with    Chest Pain     Worse with ambulation. Cardiac hx. Also reports nose bleed since yesterday. Cauterized on Thursday. On blood thinners. Bleeding controlled in triage.     Review of patient's allergies indicates:   Allergen Reactions    Fosinopril      Intolerance- elevates potassium level      Losartan      Intolerance- elevates potassium level     Past Medical History:   Diagnosis Date    Anemia of chronic renal failure, stage 3 (moderate) 05/27/2015    Anticoagulant long-term use     Atherosclerosis of  coronary artery bypass graft of native heart without angina pectoris 09/11/2012    3-27-18 [facility] Two vessel coronary artery disease.   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    Bilateral carotid artery disease 02/09/2017    Bleeding from the nose     Bleeding nose 03/21/2018    Cataract     CKD (chronic kidney disease) stage 3, GFR 30-59 ml/min 05/27/2015    Claudication of left lower extremity 09/17/2014    Colon polyp     Encounter for blood transfusion     Gastroesophageal reflux disease without esophagitis 03/19/2018    Gastrointestinal hemorrhage associated with intestinal diverticulosis 04/01/2018    Glaucoma     H/O mechanical aortic valve replacement 09/17/2014    History of gout 09/26/2012    Hyperparathyroidism due to renal insufficiency 07/27/2015    Internal hemorrhoid 04/03/2018    Long term current use of anticoagulant therapy 09/26/2012    Mechanical heart valve present     Metabolic acidosis with normal anion gap and bicarbonate losses 03/20/2018    Mixed hyperlipidemia 09/26/2012    NSTEMI (non-ST elevated myocardial infarction) 03/21/2018    Obesity, diabetes, and hypertension syndrome 02/23/2016    PVD (peripheral vascular disease) 09/11/2012    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    Syncope 9/29/2022    Type 2 diabetes mellitus with diabetic peripheral angiopathy without gangrene 05/27/2015    Type 2 diabetes mellitus with stage 3 chronic kidney disease, without long-term current use of insulin 10/02/2013

## 2022-10-23 NOTE — ED PROVIDER NOTES
Encounter Date: 10/23/2022       History     Chief Complaint   Patient presents with    Chest Pain     Worse with ambulation. Cardiac hx. Also reports nose bleed since yesterday. Cauterized on Thursday. On blood thinners. Bleeding controlled in triage.     82yoM w complex CAD hx including multiple MI s/p CABG and SILVIO, prosthetic aortic valve, CVA, CKDIII, diverticulitis with GI bleed, dyspepsia here for 1 month history of worsening epistaxis of left naris. Patient has had occasional epistaxis over the last year. Takes warfarin and plavix, followed by coumadin clinic. Was started on lovenox bridge for aortogram this month and has been having severe profuse epistaxis last week that was cauterized on Thursday 10/20 by ENT. Given saline nasal spray. Denies oxymetazoline, pseudoephedrine use. Pt began having epistaxis from right nostril 2 nights ago that prevented him from sleeping; sneezed last night and now rebleeding from left as well. Pt did not take lovenox this AM. Tamponaded temporarily with cotton shortly after arrival, began bleeding during exam. States he can feel bleeding into his throat. Denies melena/hematochezia. Came to ED last week for syncope 2/2 diarrhea, resolved with loperamide. Guaiac negative, lab workup pending. Discharged with instructions to stop Bumex until he saw his PCP. Since then pt has had increasing orthopnea, LÓPEZ, BLE edema.      Also complaining of known GERD, EGD was scheduled by GI but delayed due to bleeding. States his chest pain is a burning pain worsened post-prandially and with movement. Denies crushing pain, states his pain is unlike his prior Mis.  Review of patient's allergies indicates:   Allergen Reactions    Fosinopril      Intolerance- elevates potassium level      Losartan      Intolerance- elevates potassium level     Past Medical History:   Diagnosis Date    Anemia of chronic renal failure, stage 3 (moderate) 05/27/2015    Anticoagulant long-term use      Atherosclerosis of coronary artery bypass graft of native heart without angina pectoris 09/11/2012    3-27-18 [facility] Two vessel coronary artery disease.   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    Bilateral carotid artery disease 02/09/2017    Bleeding from the nose     Bleeding nose 03/21/2018    Cataract     CKD (chronic kidney disease) stage 3, GFR 30-59 ml/min 05/27/2015    Claudication of left lower extremity 09/17/2014    Colon polyp     Encounter for blood transfusion     Gastroesophageal reflux disease without esophagitis 03/19/2018    Gastrointestinal hemorrhage associated with intestinal diverticulosis 04/01/2018    Glaucoma     H/O mechanical aortic valve replacement 09/17/2014    History of gout 09/26/2012    Hyperparathyroidism due to renal insufficiency 07/27/2015    Internal hemorrhoid 04/03/2018    Long term current use of anticoagulant therapy 09/26/2012    Mechanical heart valve present     Metabolic acidosis with normal anion gap and bicarbonate losses 03/20/2018    Mixed hyperlipidemia 09/26/2012    NSTEMI (non-ST elevated myocardial infarction) 03/21/2018    Obesity, diabetes, and hypertension syndrome 02/23/2016    PVD (peripheral vascular disease) 09/11/2012    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    Syncope 9/29/2022    Type 2 diabetes mellitus with diabetic peripheral angiopathy without gangrene 05/27/2015    Type 2 diabetes mellitus with stage 3 chronic kidney disease, without long-term current use of insulin 10/02/2013     Past Surgical History:   Procedure Laterality Date    CARDIAC CATHETERIZATION      CARDIAC VALVE REPLACEMENT      CARDIAC VALVE SURGERY      CARPAL TUNNEL RELEASE Right 5/19/2020    Procedure: RELEASE, CARPAL TUNNEL;  Surgeon: Rupesh Norris Jr., MD;  Location: [facility];  Service: Plastics;  Laterality: Right;    COLON SURGERY      COLONOSCOPY N/A 3/31/2017    Procedure: COLONOSCOPY;  Surgeon: Bruno Raymond  MD;  Location: [facility] (4TH FLR);  Service: Endoscopy;  Laterality: N/A;  Patient's wife requesting date.    COLONOSCOPY N/A 4/3/2018    Procedure: COLONOSCOPY;  Surgeon: Bonifacio Pelletier MD;  Location: [facility] (2ND FLR);  Service: Endoscopy;  Laterality: N/A;    COLONOSCOPY N/A 8/13/2018    Procedure: COLONOSCOPY;  Surgeon: Kam Barba MD;  Location: [facility] ENDO (2ND FLR);  Service: Endoscopy;  Laterality: N/A;  2nd floor: PA pressure 49; hx of moderate-severe valve disease     per Coumadin clinic-Patient can hold 5 days with lovenox bridge       ok to schedule per Katarina    CORONARY ANGIOGRAPHY N/A 10/4/2021    Procedure: Left heart cath +/- peripheral angiogram;  Surgeon: Jose Ruiz MD;  Location: [facility] CATH LAB;  Service: Cardiology;  Laterality: N/A;    CORONARY ANGIOPLASTY      CORONARY ARTERY BYPASS GRAFT      CORONARY BYPASS GRAFT ANGIOGRAPHY  10/4/2021    Procedure: Bypass graft study;  Surgeon: Jose Ruiz MD;  Location: [facility] CATH LAB;  Service: Cardiology;;    HERNIA REPAIR      SPINE SURGERY      VASECTOMY       Family History   Problem Relation Age of Onset    Heart failure Mother     Heart disease Mother     Heart failure Father     Heart disease Father     Alcohol abuse Father     Heart failure Brother     Heart disease Brother     Diabetes Brother     No Known Problems Sister     No Known Problems Maternal Grandmother     No Known Problems Maternal Grandfather     No Known Problems Paternal Grandmother     No Known Problems Paternal Grandfather     Heart disease Sister     No Known Problems Maternal Aunt     No Known Problems Maternal Uncle     No Known Problems Paternal Aunt     No Known Problems Paternal Uncle     Amblyopia Neg Hx     Blindness Neg Hx     Cancer Neg Hx     Cataracts Neg Hx     Glaucoma Neg Hx     Hypertension Neg Hx     Macular degeneration Neg Hx     Retinal detachment Neg Hx     Strabismus Neg Hx     Stroke Neg Hx     Thyroid  disease Neg Hx     Anemia Neg Hx     Arrhythmia Neg Hx     Asthma Neg Hx     Clotting disorder Neg Hx     Fainting Neg Hx     Heart attack Neg Hx     Hyperlipidemia Neg Hx     Atrial Septal Defect Neg Hx     Melanoma Neg Hx      Social History     Tobacco Use    Smoking status: Former     Packs/day: 1.00     Years: 20.00     Pack years: 20.00     Types: Cigarettes     Quit date: 1980     Years since quittin.1     Passive exposure: Never    Smokeless tobacco: Never   Substance Use Topics    Alcohol use: No    Drug use: No     Review of Systems   Constitutional:  Positive for fatigue. Negative for activity change, diaphoresis and fever.   HENT:  Positive for nosebleeds. Negative for trouble swallowing and voice change.    Eyes:  Negative for visual disturbance.   Respiratory:  Positive for shortness of breath. Negative for apnea, cough and chest tightness.    Cardiovascular:  Positive for chest pain (dyspepsia) and leg swelling. Negative for palpitations.   Gastrointestinal:  Negative for abdominal distention, abdominal pain, blood in stool, nausea and vomiting.   Genitourinary:  Negative for difficulty urinating.   Musculoskeletal:  Negative for back pain.   Skin:  Negative for pallor and wound.   Neurological:  Negative for dizziness and headaches.   Hematological:  Negative for adenopathy. Bruises/bleeds easily.   Psychiatric/Behavioral:  Positive for sleep disturbance. Negative for confusion.      Physical Exam     Initial Vitals [10/23/22 0748]   BP Pulse Resp Temp SpO2   (!) 168/70 61 16 98.7 °F (37.1 °C) 95 %      MAP       --         Physical Exam    Constitutional: He appears well-developed and well-nourished.   HENT:   Head: Normocephalic and atraumatic.   Nose: No nasal deformity, septal deviation or nasal septal hematoma. Epistaxis is observed.   Blood at posterior oropharynx   Eyes: Conjunctivae and EOM are normal.   Neck: No JVD present.   Normal range of motion.  Cardiovascular:   Normal rate, regular rhythm and intact distal pulses.           Pulmonary/Chest: He has rales.   Abdominal: Abdomen is soft. Bowel sounds are normal.   Musculoskeletal:         General: Edema present. Normal range of motion.      Cervical back: Normal range of motion.     Neurological: He is alert and oriented to person, place, and time.   Skin: Skin is warm and dry. Capillary refill takes less than 2 seconds.   Psychiatric: He has a normal mood and affect. His behavior is normal. Judgment and thought content normal.       ED Course   Procedures  Labs Reviewed   CBC W/ AUTO DIFFERENTIAL - Abnormal; Notable for the following components:       Result Value    RBC 2.92 (*)     Hemoglobin 8.9 (*)     Hematocrit 27.7 (*)     RDW 16.1 (*)     Lymph # 0.9 (*)     Gran % 73.9 (*)     Lymph % 13.9 (*)     All other components within normal limits   PROTIME-INR - Abnormal; Notable for the following components:    Prothrombin Time 20.3 (*)     INR 2.0 (*)     All other components within normal limits   APTT - Abnormal; Notable for the following components:    aPTT 36.2 (*)     All other components within normal limits   ANTI-XA HEPARIN MONITORING - Abnormal; Notable for the following components:    Heparin Anti-Xa 0.28 (*)     All other components within normal limits   COMPREHENSIVE METABOLIC PANEL   TROPONIN I   B-TYPE NATRIURETIC PEPTIDE   URINALYSIS, REFLEX TO URINE CULTURE   URINALYSIS MICROSCOPIC     EKG Readings: (Independently Interpreted)   Known LBBB unchanged from prior EKG. Does not meet modified [place] STEMI criteria.   ECG Results              EKG 12-lead (Final result)  Result time 10/23/22 09:08:22      Final result by Interface, Lab In [facility] (10/23/22 09:08:22)                   Narrative:    Test Reason : R07.9,    Vent. Rate : 065 BPM     Atrial Rate : 000 BPM     P-R Int : 000 ms          QRS Dur : 146 ms      QT Int : 460 ms       P-R-T Axes : 000 -67 092 degrees     QTc Int : 478 ms    Atrial  fibrillation with some artifact  Left axis deviation  Left bundle branch block  Abnormal ECG  When compared with ECG of 01-OCT-2022 14:22,  Atrial fibrillation has replaced Sinus rhythm  T wave inversion no longer evident in Inferior leads  Confirmed by Jeremias APARICIO MD ([id number]) on 10/23/2022 9:08:16 AM    Referred By: KEELYERR   SELF           Confirmed By:Jeremias APARICIO MD                                  Imaging Results              X-Ray Chest AP Portable (Final result)  Result time 10/23/22 09:42:30      Final result by Joyce Mcclelland MD (10/23/22 09:42:30)                   Impression:      Faint reticular opacities are present bilaterally which could be due to mild interstitial edema or less likely an infectious process.      Electronically signed by: Joyce Mcclelland MD  Date:    10/23/2022  Time:    09:42               Narrative:    EXAMINATION:  XR CHEST AP PORTABLE    CLINICAL HISTORY:  Chest pain, unspecified    TECHNIQUE:  Single frontal view of the chest was performed.    COMPARISON:  Prior dated 09/29/2022    FINDINGS:  The mediastinal structures are midline.  The cardiac silhouette is enlarged and stable.  Sternotomy wires are intact.  Faint reticular opacities are present bilaterally which could be due to mild interstitial edema or less likely an infectious process.    There is degenerative change of the spine and shoulders.                                       Medications   calcium carbonate 200 mg calcium (500 mg) chewable tablet 500 mg (500 mg Oral Given 10/23/22 0945)     Medical Decision Making:   Initial Assessment:   82yoM w recurrent profuse epistaxis on multiple AC and antiplatelet agent for PAD and mechanical aortic valve along with volume overload after discontinuing diuretic.  Differential Diagnosis:   Anticoagulant overmedication  Acute decompensated heart failure 2/2 diuretic discontinuation  ED Management:  Goal INR 1-2  0930:  - ENT consulted  - coagulation labs  - Cardiac workup to  r/o ischemic chest pain.  - Lasix 80mg IV  - BNP 2348. CXR significant for vascular congestion.  82yoM w complex cardiac hx including mechanical aortic valve, CABG (on warfarin, plavix), SILVIO, CKDIII here for multiple complaints. Having profuse L naris epistaxis 2/2 lovenox bridge for EGD (GERD) that was subsequently cancelled due to aforementioned bleed. Cauterized on Thursday, R nostril started bleeding shortly afterwards and L is now rebleeding. Supposed to revisit coumadin clinic tomorrow 10/24. pTT supratherapeutic. PT/INR in goal. ENT consulted.  His dyspepsia has worsened as well, now in 6/10 burning epigastric pain. Also of note, he came to the ED on Thursday for dehydration 2/2 diarrhea. lab workup pending still but symptomatically improved with loperamide, he was discharged and told to stop his bumex. Now he's significantly overloaded, looks to be in decompensated heart failure: LÓPEZ, orthopnea, BLE edema. Wet CXR. BNP 2348. Giving lasix 80mg IV          Attending Attestation:   Physician Attestation Statement for Resident:  As the supervising MD   Physician Attestation Statement: I have personally seen and examined this patient.   I agree with the above history.  - with the following exceptions: This is an 82-year-old male who presents to the emergency department today with a complicated past medical history including coronary disease, prosthetic valve on anticoagulation long-term, and history of diverticular bleed amidst other comorbidities.  Most recently, he has had alterations to his anticoagulation plan as they were planning to perform a few procedures that required him to be bridged on Lovenox.  Since adding the Lovenox he has had difficulty with epistaxis.  Over the past few days he has been on both Lovenox and Coumadin as the procedure that they were planning for and bridging for was canceled.  Now they were trying to, per my observations reviewing the chart, get the patient's INR back to  therapeutic by bridging him with the Lovenox as starting the Coumadin back.  Since he has been on both agents he has had recurrent epistaxis.  He was seen by Dr. Muñoz in ENT clinic and had the left side cauterized which was successful for 2 days and then the bleeding resumed yesterday and now is emanating from both nares.  He also states he can feel a bit of epistaxis running down the back of his throat.  This is been intermittent since last night.  He does not have any other new symptoms.  He has some chronic problems with indigestion which was why they were planning for a scope.  He states that these chronic problems are no different than they typically are.  He has not had any fevers or chills.  He has known shortness of breath (even to the point that when he bends over to pick something up or walks across the room he gets short of breath - this has been going on for awhile now and is getting worse over time).  No vomiting.  No other bleeding diatheses-no bleeding from the gums and no bleeding in his stool to knowledge.  He also has had no syncope or near syncope.   As the supervising MD I agree with the above PE.   - with the following exceptions: VS upon arrival: 168/70; 61; 16; 95% RA; AF.   Consititutional: Pt is awake, alert, and oriented x 4. Does not appear to be in any dewayne distress.  HEENT: PERRL; EOMI; blood soaked packing in right nare; dried blood in left nare (I cannot see active bleeding on that side); op with scant blood running down back of throat (mild); mmm without lesions.  Neck: Supple with good ROM.  CV: Normal rate; regular rhythm; no mrg. Heart sounds normal. Subtle pedal and ankle bilateral peripheral edema. 2+ radials bilateral and symmetric.  Respiratory: Bibasilar crackles are present but no dewayne respiratory distress at rest.   GI: Abdomen soft, NTND. No rebound. No guarding. BS normal.  MSK: No long bone deformities; no focal joint swellings.  Neuro: MAEW.   Skin: No skin lesions  or rash.       As the supervising MD I agree with the above treatment, course, plan, and disposition.   - with the following exceptions: The patient has multiple ongoing issues that are complex.  First of all he has the epistaxis in the setting of multiple anticoagulants on board.  We consulted ENT given the fact that they had already seen him in clinic for this and he was having the continued bleeding.  They evaluated him here in the emergency department.  The bleeding is now stopped.  His INR is 2.  This is a complex issue given the fact that he takes this for a mechanical valve and this is his target range.  Additionally, he does have some chronic other symptoms including chronic indigestion and shortness of breath that has been slowly progressing over time.  We have found here today on our workup that he does indeed have evidence of volume overload with a BNP of 2348 and a chest x-ray that was independently reviewed and interpreted by me and interpreted by radiology that showed increased volume as well.  He will require admission for further care for decompensated heart failure and also to address his anticoagulation in the setting of a therapeutic INR, intermittent epistaxis, and a mechanical valve and to determine the approach that will be used as they anticipate these future diagnostic studies for which she was initially on Lovenox for in the first place. His hct right now is 27.7. He does not require any blood product transfusion. He will need close watch and discussion regarding his anticoagulation and continued treatment of his heart failure during his admission.   ED Diagnosis:  1. Acute decompensated heart failure exacerbation.   2. Acute recurrent epistaxis, complicated by current anticoagulation for mechanical valve.      I have reviewed and agree with the residents interpretation of the following: lab data, EKG and x-rays.  I have reviewed the following: old records at this facility.        Attending  Critical Care:   Critical Care Times:   Direct Patient Care (initial evaluation, reassessments, and time considering the case)................................................................18 minutes.   Additional History from reviewing old medical records or taking additional history from the family, EMS, PCP, etc.......................5 minutes.   Ordering, Reviewing, and Interpreting Diagnostic Studies...............................................................................................................7 minutes.   Documentation..................................................................................................................................................................................5 minutes.   Consultation with other Physicians. .................................................................................................................................................6 minutes.   ==============================================================  Total Critical Care Time - exclusive of procedural time: 41 minutes.  ==============================================================  Critical care reasons: epistaxis requiring ENT assistance and complicated by anticoagulation; decompensated heart failure exacerbation.   Critical care was time spent personally by me on the following activities: obtaining history from patient or relative, examination of patient, review of old charts, ordering lab, x-rays, and/or EKG, development of treatment plan with patient or relative, ordering and performing treatments and interventions, evaluation of patient's response to treatment, discussion with consultants and re-evaluation of patient's conition.   Critical Care Condition: critical         ED Course as of 10/23/22 [address] [place] [address] Oct 23, 2022   0856 EKG 12-lead [KR]      ED Course User Index  [KR] Lexii Louis MD                 Clinical Impression:   Final diagnoses:  [R07.9] Chest pain                Lexii Louis MD  Resident  10/23/22 1156       Yani nAdrade MD  10/26/22 3749

## 2022-10-23 NOTE — ED NOTES
Pt placed on telemetry box [id number]. Rhythm and rate confirmed by telemetry technician: Afib  at a rate of 60 bpm

## 2022-10-23 NOTE — SUBJECTIVE & OBJECTIVE
Past Medical History:   Diagnosis Date    Anemia of chronic renal failure, stage 3 (moderate) 05/27/2015    Anticoagulant long-term use     Atherosclerosis of coronary artery bypass graft of native heart without angina pectoris 09/11/2012    3-27-18 [facility] Two vessel coronary artery disease.   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    Bilateral carotid artery disease 02/09/2017    Bleeding from the nose     Bleeding nose 03/21/2018    Cataract     CKD (chronic kidney disease) stage 3, GFR 30-59 ml/min 05/27/2015    Claudication of left lower extremity 09/17/2014    Colon polyp     Encounter for blood transfusion     Gastroesophageal reflux disease without esophagitis 03/19/2018    Gastrointestinal hemorrhage associated with intestinal diverticulosis 04/01/2018    Glaucoma     H/O mechanical aortic valve replacement 09/17/2014    History of gout 09/26/2012    Hyperparathyroidism due to renal insufficiency 07/27/2015    Internal hemorrhoid 04/03/2018    Long term current use of anticoagulant therapy 09/26/2012    Mechanical heart valve present     Metabolic acidosis with normal anion gap and bicarbonate losses 03/20/2018    Mixed hyperlipidemia 09/26/2012    NSTEMI (non-ST elevated myocardial infarction) 03/21/2018    Obesity, diabetes, and hypertension syndrome 02/23/2016    PVD (peripheral vascular disease) 09/11/2012    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    Syncope 9/29/2022    Type 2 diabetes mellitus with diabetic peripheral angiopathy without gangrene 05/27/2015    Type 2 diabetes mellitus with stage 3 chronic kidney disease, without long-term current use of insulin 10/02/2013       Past Surgical History:   Procedure Laterality Date    CARDIAC CATHETERIZATION      CARDIAC VALVE REPLACEMENT      CARDIAC VALVE SURGERY      CARPAL TUNNEL RELEASE Right 5/19/2020    Procedure: RELEASE, CARPAL TUNNEL;  Surgeon: Rupesh Norris Jr., MD;  Location: [facility];  Service: Plastics;  Laterality:  Right;    COLON SURGERY      COLONOSCOPY N/A 3/31/2017    Procedure: COLONOSCOPY;  Surgeon: Bruno Raymond MD;  Location: [facility] ENDO (4TH FLR);  Service: Endoscopy;  Laterality: N/A;  Patient's wife requesting date.    COLONOSCOPY N/A 4/3/2018    Procedure: COLONOSCOPY;  Surgeon: Bonifacio Pelletier MD;  Location: [facility] ENDO (2ND FLR);  Service: Endoscopy;  Laterality: N/A;    COLONOSCOPY N/A 8/13/2018    Procedure: COLONOSCOPY;  Surgeon: Kam Barba MD;  Location: [facility] ENDO (2ND FLR);  Service: Endoscopy;  Laterality: N/A;  2nd floor: PA pressure 49; hx of moderate-severe valve disease     per Coumadin clinic-Patient can hold 5 days with lovenox bridge       ok to schedule per Katarina    CORONARY ANGIOGRAPHY N/A 10/4/2021    Procedure: Left heart cath +/- peripheral angiogram;  Surgeon: Jose Ruiz MD;  Location: [facility] CATH LAB;  Service: Cardiology;  Laterality: N/A;    CORONARY ANGIOPLASTY      CORONARY ARTERY BYPASS GRAFT      CORONARY BYPASS GRAFT ANGIOGRAPHY  10/4/2021    Procedure: Bypass graft study;  Surgeon: Jose Ruiz MD;  Location: [facility] CATH LAB;  Service: Cardiology;;    HERNIA REPAIR      SPINE SURGERY      VASECTOMY         Review of patient's allergies indicates:   Allergen Reactions    Fosinopril      Intolerance- elevates potassium level      Losartan      Intolerance- elevates potassium level       No current facility-administered medications on file prior to encounter.     Current Outpatient Medications on File Prior to Encounter   Medication Sig    allopurinoL (ZYLOPRIM) 100 MG tablet TAKE 1 TABLET EVERY DAY    azelastine (ASTELIN) 137 mcg (0.1 %) nasal spray 1 spray by Nasal route 2 (two) times daily as needed for Rhinitis.    clopidogreL (PLAVIX) 75 mg tablet Take 1 tablet (75 mg total) by mouth once daily.    enoxaparin (LOVENOX) 80 mg/0.8 mL Syrg Inject 0.8 mLs (80 mg total) into the skin once daily.    isosorbide mononitrate (IMDUR) 120 MG 24 hr tablet Take 1 tablet (120 mg total) by  mouth once daily.    nitroGLYCERIN (NITROSTAT) 0.4 MG SL tablet Place 1 tablet (0.4 mg total) under the tongue every 5 (five) minutes as needed. As needed for chest pain    omega-3 acid ethyl esters (LOVAZA) 1 gram capsule Take 2 capsules (2 g total) by mouth 2 (two) times daily. (Patient taking differently: Take 1 g by mouth once daily.)    rosuvastatin (CRESTOR) 40 MG Tab TAKE 1 TABLET EVERY EVENING. (Patient taking differently: Take 40 mg by mouth every evening.)    warfarin (COUMADIN) 5 MG tablet Take 2 tablets (10mg) by mouth , then 1.5 tablets (7.5mg) by mouth all other days as instructed by [facility] Clinic. (Patient taking differently: Take 1 tablet (5 mg) by mouth Monday, Tuesday, Wednesday, Friday & Saturday then 1.5 tablets (7.5mg) by mouth all other days (Thurs & Sun) as instructed by Coumadin Clinic.)    bismuth subsalicylate (PEPTO BISMOL) 262 mg/15 mL suspension Take 30 mLs by mouth once as needed (Diarrhea).    lactose-reduced food (ENSURE ORAL) Take 1-2 Cans by mouth once daily.    loperamide (IMODIUM A-D) 2 mg Tab Take 2 capsules at onset then 1 capsule as needed for diarrhea.    prednisolon/gatiflox/bromfenac (PREDNISOL ACE-GATIFLOX-BROMFEN) 1-0.5-0.075 % DrpS Apply 1 drop to eye 3 (three) times daily. One drop 3 times a day in surgical eye     Family History       Problem Relation (Age of Onset)    Alcohol abuse Father    Diabetes Brother    Heart disease Mother, Father, Brother, Sister    Heart failure Mother, Father, Brother    No Known Problems Sister, Maternal Grandmother, Maternal Grandfather, Paternal Grandmother, Paternal Grandfather, Maternal Aunt, Maternal Uncle, Paternal Aunt, Paternal Uncle          Tobacco Use    Smoking status: Former     Packs/day: 1.00     Years: 20.00     Pack years: 20.00     Types: Cigarettes     Quit date: 1980     Years since quittin.1     Passive exposure: Never    Smokeless tobacco: Never   Substance and Sexual Activity    Alcohol use: No     Drug use: No    Sexual activity: Not Currently     Review of Systems   Constitutional:  Negative for chills and fever.   HENT:  Positive for nosebleeds. Negative for sore throat and trouble swallowing.    Eyes:  Negative for photophobia and visual disturbance.   Respiratory:  Positive for shortness of breath. Negative for cough.    Cardiovascular:  Positive for leg swelling. Negative for chest pain.   Gastrointestinal:  Negative for abdominal pain, diarrhea, nausea and vomiting.   Genitourinary:  Negative for dysuria, frequency and urgency.   Musculoskeletal:  Negative for arthralgias and myalgias.   Neurological:  Negative for dizziness, syncope and light-headedness.   Psychiatric/Behavioral:  Negative for agitation and confusion.    Objective:     Vital Signs (Most Recent):  Temp: 98.7 °F (37.1 °C) (10/23/22 0748)  Pulse: 62 (10/23/22 1323)  Resp: 16 (10/23/22 1323)  BP: (!) 164/73 (10/23/22 1323)  SpO2: 96 % (10/23/22 1301)   Vital Signs (24h Range):  Temp:  [98.7 °F (37.1 °C)] 98.7 °F (37.1 °C)  Pulse:  [60-69] 62  Resp:  [16-19] 16  SpO2:  [94 %-98 %] 96 %  BP: (164-193)/(69-83) 164/73     Weight: 78.5 kg (173 lb)  Body mass index is 27.92 kg/m².    Physical Exam  Constitutional:       General: He is not in acute distress.     Appearance: Normal appearance. He is not toxic-appearing or diaphoretic.   HENT:      Head: Normocephalic and atraumatic.   Eyes:      Extraocular Movements: Extraocular movements intact.   Cardiovascular:      Rate and Rhythm: Normal rate and regular rhythm.      Heart sounds: No murmur heard.    No friction rub. No gallop.   Pulmonary:      Effort: Pulmonary effort is normal. No respiratory distress.      Breath sounds: No wheezing or rhonchi.   Abdominal:      General: Abdomen is flat. There is no distension.      Palpations: Abdomen is soft.      Tenderness: There is no abdominal tenderness. There is no guarding or rebound.   Musculoskeletal:      Cervical back: Normal range of motion  and neck supple.      Right lower leg: Edema present.      Left lower leg: Edema present.   Skin:     General: Skin is warm and dry.   Neurological:      Mental Status: He is alert.           Significant Labs: All pertinent labs within the past 24 hours have been reviewed.    Significant Imaging: I have reviewed all pertinent imaging results/findings within the past 24 hours.

## 2022-10-24 ENCOUNTER — TELEPHONE (OUTPATIENT)
Dept: OTOLARYNGOLOGY | Facility: CLINIC | Age: 82
End: 2022-10-24
Payer: MEDICARE

## 2022-10-24 LAB
ANION GAP SERPL CALC-SCNC: 8 MMOL/L (ref 8–16)
BASOPHILS # BLD AUTO: 0.03 K/UL (ref 0–0.2)
BASOPHILS NFR BLD: 0.5 % (ref 0–1.9)
BUN SERPL-MCNC: 25 MG/DL (ref 8–23)
CALCIUM SERPL-MCNC: 10.5 MG/DL (ref 8.7–10.5)
CHLORIDE SERPL-SCNC: 108 MMOL/L (ref 95–110)
CO2 SERPL-SCNC: 22 MMOL/L (ref 23–29)
CREAT SERPL-MCNC: 1.7 MG/DL (ref 0.5–1.4)
DIFFERENTIAL METHOD: ABNORMAL
EOSINOPHIL # BLD AUTO: 0.1 K/UL (ref 0–0.5)
EOSINOPHIL NFR BLD: 2 % (ref 0–8)
ERYTHROCYTE [DISTWIDTH] IN BLOOD BY AUTOMATED COUNT: 15.9 % (ref 11.5–14.5)
EST. GFR  (NO RACE VARIABLE): 40 ML/MIN/1.73 M^2
GLUCOSE SERPL-MCNC: 92 MG/DL (ref 70–110)
HCT VFR BLD AUTO: 30.1 % (ref 40–54)
HGB BLD-MCNC: 9.8 G/DL (ref 14–18)
IMM GRANULOCYTES # BLD AUTO: 0.02 K/UL (ref 0–0.04)
IMM GRANULOCYTES NFR BLD AUTO: 0.3 % (ref 0–0.5)
INR PPP: 2.1 (ref 0.8–1.2)
LYMPHOCYTES # BLD AUTO: 1.1 K/UL (ref 1–4.8)
LYMPHOCYTES NFR BLD: 17.6 % (ref 18–48)
MCH RBC QN AUTO: 30.3 PG (ref 27–31)
MCHC RBC AUTO-ENTMCNC: 32.6 G/DL (ref 32–36)
MCV RBC AUTO: 93 FL (ref 82–98)
MONOCYTES # BLD AUTO: 0.8 K/UL (ref 0.3–1)
MONOCYTES NFR BLD: 13.4 % (ref 4–15)
NEUTROPHILS # BLD AUTO: 4.1 K/UL (ref 1.8–7.7)
NEUTROPHILS NFR BLD: 66.2 % (ref 38–73)
NRBC BLD-RTO: 0 /100 WBC
PLATELET # BLD AUTO: 175 K/UL (ref 150–450)
PMV BLD AUTO: 11.3 FL (ref 9.2–12.9)
POTASSIUM SERPL-SCNC: 4.4 MMOL/L (ref 3.5–5.1)
PROTHROMBIN TIME: 20.7 SEC (ref 9–12.5)
RBC # BLD AUTO: 3.23 M/UL (ref 4.6–6.2)
SODIUM SERPL-SCNC: 138 MMOL/L (ref 136–145)
WBC # BLD AUTO: 6.12 K/UL (ref 3.9–12.7)

## 2022-10-24 PROCEDURE — 85610 PROTHROMBIN TIME: CPT | Performed by: STUDENT IN AN ORGANIZED HEALTH CARE EDUCATION/TRAINING PROGRAM

## 2022-10-24 PROCEDURE — 63600175 PHARM REV CODE 636 W HCPCS: Performed by: STUDENT IN AN ORGANIZED HEALTH CARE EDUCATION/TRAINING PROGRAM

## 2022-10-24 PROCEDURE — 11000001 HC ACUTE MED/SURG PRIVATE ROOM

## 2022-10-24 PROCEDURE — 85025 COMPLETE CBC W/AUTO DIFF WBC: CPT | Performed by: STUDENT IN AN ORGANIZED HEALTH CARE EDUCATION/TRAINING PROGRAM

## 2022-10-24 PROCEDURE — 36415 COLL VENOUS BLD VENIPUNCTURE: CPT | Performed by: STUDENT IN AN ORGANIZED HEALTH CARE EDUCATION/TRAINING PROGRAM

## 2022-10-24 PROCEDURE — 99233 SBSQ HOSP IP/OBS HIGH 50: CPT | Mod: ,,, | Performed by: STUDENT IN AN ORGANIZED HEALTH CARE EDUCATION/TRAINING PROGRAM

## 2022-10-24 PROCEDURE — 99233 PR SUBSEQUENT HOSPITAL CARE,LEVL III: ICD-10-PCS | Mod: ,,, | Performed by: STUDENT IN AN ORGANIZED HEALTH CARE EDUCATION/TRAINING PROGRAM

## 2022-10-24 PROCEDURE — 80048 BASIC METABOLIC PNL TOTAL CA: CPT | Performed by: STUDENT IN AN ORGANIZED HEALTH CARE EDUCATION/TRAINING PROGRAM

## 2022-10-24 PROCEDURE — 25000003 PHARM REV CODE 250: Performed by: STUDENT IN AN ORGANIZED HEALTH CARE EDUCATION/TRAINING PROGRAM

## 2022-10-24 RX ORDER — LISINOPRIL 2.5 MG/1
2.5 TABLET ORAL DAILY
Status: DISCONTINUED | OUTPATIENT
Start: 2022-10-24 | End: 2022-10-25 | Stop reason: HOSPADM

## 2022-10-24 RX ORDER — CARVEDILOL 12.5 MG/1
12.5 TABLET ORAL 2 TIMES DAILY WITH MEALS
Status: ON HOLD | COMMUNITY
End: 2022-10-25 | Stop reason: SDUPTHER

## 2022-10-24 RX ORDER — LISINOPRIL 2.5 MG/1
2.5 TABLET ORAL DAILY
Status: ON HOLD | COMMUNITY
End: 2022-10-25 | Stop reason: SDUPTHER

## 2022-10-24 RX ORDER — GLIMEPIRIDE 1 MG/1
1 TABLET ORAL
COMMUNITY
End: 2022-11-14

## 2022-10-24 RX ORDER — BUMETANIDE 1 MG/1
1 TABLET ORAL 2 TIMES DAILY
Status: DISCONTINUED | OUTPATIENT
Start: 2022-10-24 | End: 2022-10-24

## 2022-10-24 RX ORDER — BUMETANIDE 1 MG/1
1 TABLET ORAL 2 TIMES DAILY
Status: DISCONTINUED | OUTPATIENT
Start: 2022-10-25 | End: 2022-10-25 | Stop reason: HOSPADM

## 2022-10-24 RX ORDER — LOPERAMIDE HYDROCHLORIDE 2 MG/1
2 CAPSULE ORAL 4 TIMES DAILY PRN
Status: ON HOLD | COMMUNITY
End: 2023-12-15

## 2022-10-24 RX ORDER — WARFARIN SODIUM 5 MG/1
5 TABLET ORAL
Status: DISCONTINUED | OUTPATIENT
Start: 2022-10-24 | End: 2022-10-25 | Stop reason: HOSPADM

## 2022-10-24 RX ADMIN — CARVEDILOL 6.25 MG: 6.25 TABLET, FILM COATED ORAL at 10:10

## 2022-10-24 RX ADMIN — CARVEDILOL 6.25 MG: 6.25 TABLET, FILM COATED ORAL at 08:10

## 2022-10-24 RX ADMIN — LOPERAMIDE HYDROCHLORIDE 2 MG: 2 CAPSULE ORAL at 10:10

## 2022-10-24 RX ADMIN — WARFARIN SODIUM 5 MG: 5 TABLET ORAL at 06:10

## 2022-10-24 RX ADMIN — ATORVASTATIN CALCIUM 80 MG: 40 TABLET, FILM COATED ORAL at 08:10

## 2022-10-24 RX ADMIN — FUROSEMIDE 80 MG: 10 INJECTION, SOLUTION INTRAMUSCULAR; INTRAVENOUS at 10:10

## 2022-10-24 RX ADMIN — LISINOPRIL 2.5 MG: 2.5 TABLET ORAL at 10:10

## 2022-10-24 RX ADMIN — ISOSORBIDE MONONITRATE 120 MG: 60 TABLET, EXTENDED RELEASE ORAL at 09:10

## 2022-10-24 RX ADMIN — CLOPIDOGREL BISULFATE 75 MG: 75 TABLET ORAL at 10:10

## 2022-10-24 RX ADMIN — ALLOPURINOL 100 MG: 100 TABLET ORAL at 09:10

## 2022-10-24 NOTE — ASSESSMENT & PLAN NOTE
82 y.o. male with CAD s/p CABG, PCI (11/2021), mechanical aortic valve (2014), HFmEF, CVA, CKD III, HTN, who presented with epistaxis.  Reports having had worsening shortness of breath with exertion over the last several weeks, orthopnea and lower extremity edema. BNP 2,348, CXR with faint reticular opacities present bilaterally  Likely secondary to holding bumex  - Lasix 80 IV BID, transitioned back to bumex when able  - Strict I/Os, daily weights, Low Na diet

## 2022-10-24 NOTE — TELEPHONE ENCOUNTER
Spoke with patient wife about appointment request that was sent to office. I called in an attempt to get the patient scheduled. She mentioned he was currently in the ED for recurrent Nosebleed and had another cauterization done by ENT residents.     Told patient I will relay message to  for him to be aware and to have them follow up if needed. I offered 2 available appointments if they wanted to be seen in office.

## 2022-10-24 NOTE — PHARMACY MED REC
"Admission Medication History     The home medication history was taken by Harjit Arzola.    You may go to "Admission" then "Reconcile Home Medications" tabs to review and/or act upon these items.     The home medication list has been updated by the Pharmacy department.   Please read ALL comments highlighted in yellow.   Please address this information as you see fit.    Feel free to contact us if you have any questions or require assistance.      The medications listed below were removed from the home medication list. Please reorder if appropriate:  Patient reports no longer taking the following medication(s):  Bismuth subsalicylate (Pepto Bismol)  Azelastine nasal spray  Enoxaparin (Lovenox)      Medications listed below were obtained from: Patient/family, [employer], and Patient's pharmacy  PTA Medications   Medication Sig    allopurinoL (ZYLOPRIM) 100 MG tablet TAKE 1 TABLET EVERY DAY    carvediloL (COREG) 12.5 MG tablet Take 12.5 mg by mouth 2 (two) times daily with meals.    clopidogreL (PLAVIX) 75 mg tablet Take 1 tablet (75 mg total) by mouth once daily.    glimepiride (AMARYL) 1 MG tablet Take 1 mg by mouth before breakfast.    isosorbide mononitrate (IMDUR) 120 MG 24 hr tablet Take 1 tablet (120 mg total) by mouth once daily.    lactose-reduced food (ENSURE ORAL) Take by mouth. Take 1-2 cans by mouth once daily    lisinopriL (PRINIVIL,ZESTRIL) 2.5 MG tablet Take 2.5 mg by mouth once daily.    loperamide (IMODIUM) 2 mg capsule Take 2 mg by mouth 4 (four) times daily as needed for Diarrhea.    omega-3 acid ethyl esters (LOVAZA) 1 gram capsule Take 2 capsules (2 g total) by mouth 2 (two) times daily. (Patient taking differently: Take 1 g by mouth once daily.)    rosuvastatin (CRESTOR) 40 MG Tab TAKE 1 TABLET EVERY EVENING. (Patient taking differently: Take 40 mg by mouth every evening.)    warfarin (COUMADIN) 5 MG tablet Take 2 tablets (10mg) by mouth Sunday, then 1.5 tablets (7.5mg) by mouth " all other days as instructed by Coumadin Clinic. (Patient taking differently: Take 1 tablet (5 mg) by mouth Monday, Tuesday, Wednesday, Friday & Saturday then 1.5 tablets (7.5mg) by mouth all other days (Thurs & Sun) as instructed by Coumadin Clinic.)    prednisolon/gatiflox/bromfenac (PREDNISOL ACE-GATIFLOX-BROMFEN) 1-0.5-0.075 % DrpS Apply 1 drop to eye 3 (three) times daily. One drop 3 times a day in surgical eye    Patient and family mention this is an active script needed for planned eye surgery in a few weeks.      Potential issues to be addressed PRIOR TO DISCHARGE    -Clarify if patient is taking glimepiride 1 mg daily. A1c < 6%, renal function, recent fill in July 2022.    Harjit Arzola  EXT [id number]    .

## 2022-10-24 NOTE — SUBJECTIVE & OBJECTIVE
Interval History: Patient states he coughed up a large clot this morning. Had episode of diarrhea.     Review of Systems  Objective:     Vital Signs (Most Recent):  Temp: 98.1 °F (36.7 °C) (10/24/22 1104)  Pulse: (!) 59 (10/24/22 1221)  Resp: 18 (10/24/22 1104)  BP: (!) 89/60 (10/24/22 1104)  SpO2: 97 % (10/24/22 1104)   Vital Signs (24h Range):  Temp:  [97.1 °F (36.2 °C)-98.1 °F (36.7 °C)] 98.1 °F (36.7 °C)  Pulse:  [52-78] 59  Resp:  [16-19] 18  SpO2:  [91 %-97 %] 97 %  BP: ()/(60-76) 89/60     Weight: 79.3 kg (174 lb 13.2 oz)  Body mass index is 28.22 kg/m².    Intake/Output Summary (Last 24 hours) at 10/24/2022 1239  Last data filed at 10/24/2022 0626  Gross per 24 hour   Intake 250 ml   Output --   Net 250 ml      Physical Exam  Constitutional:       General: He is not in acute distress.     Appearance: Normal appearance. He is not toxic-appearing or diaphoretic.   Cardiovascular:      Rate and Rhythm: Normal rate and regular rhythm.      Heart sounds: No murmur heard.    No friction rub. No gallop.   Pulmonary:      Effort: Pulmonary effort is normal. No respiratory distress.      Breath sounds: No wheezing or rhonchi.   Abdominal:      General: Abdomen is flat. There is no distension.      Palpations: Abdomen is soft.      Tenderness: There is no abdominal tenderness. There is no guarding or rebound.   Musculoskeletal:      Right lower leg: No edema.      Left lower leg: No edema.   Skin:     General: Skin is warm and dry.   Neurological:      Mental Status: He is alert.       MELD-Na score: 20 at 10/24/2022  6:14 AM  MELD score: 20 at 10/24/2022  6:14 AM  Calculated from:  Serum Creatinine: 1.7 mg/dL at 10/24/2022  6:14 AM  Serum Sodium: 138 mmol/L (Using max of 137 mmol/L) at 10/24/2022  6:14 AM  Total Bilirubin: 0.5 mg/dL (Using min of 1 mg/dL) at 10/23/2022  9:22 AM  INR(ratio): 2.1 at 10/24/2022  6:14 AM  Age: 82 years    Significant Labs:  CBC:  Recent Labs   Lab 10/23/22  0922 10/24/22  0614    WBC 6.42 6.12   HGB 8.9* 9.8*   HCT 27.7* 30.1*    175     CMP:  Recent Labs   Lab 10/23/22  0922 10/24/22  0614    138   K 5.1 4.4   * 108   CO2 20* 22*    92   BUN 25* 25*   CREATININE 1.7* 1.7*   CALCIUM 9.4 10.5   PROT 7.2  --    ALBUMIN 3.4*  --    BILITOT 0.5  --    ALKPHOS 73  --    AST 22  --    ALT 19  --    ANIONGAP 6* 8     PTINR:  Recent Labs   Lab 10/23/22  0922 10/24/22  0614   INR 2.0* 2.1*       Significant Procedures:   Dobutamine Stress Test with Color Flow: No results found for this or any previous visit.

## 2022-10-24 NOTE — PROGRESS NOTES
[facility] Medicine  Progress Note    Patient Name: Dariel Urbina  MRN: [id number]  Patient Class: IP- Inpatient   Admission Date: 10/23/2022  Length of Stay: 1 days  Attending Physician: Oscar Farias*  Primary Care Provider: Devon Langston MD        Subjective:     Principal Problem:Acute anterior epistaxis        HPI:  Dariel Urbina is a 82 y.o. male with past medical history of CAD s/p CABG, PCI (11/2021), mechanical aortic valve (2014), HFmEF, CVA, CKD III, HTN, who presented with epistaxis. Patient reports long-standing history of episodes of epistaxis. He first developed L sided epistaxis more recently last week and went to clinic with ENT where it was cauterized. Then two days ago he developed bleeding on the right side, followed by profuse recurrent bleeding from the L side yesterday. He then came into the ED. Of note patient was on lovenox bridge for planned aortogram, but this was cancelled when he developed bleeding. Prior to presentation he was taking lovenox shots and coumadin managed by coumadin clinic. He also takes plavix. Patient was also hospitalized 9/29- 10/1 following a syncopal episode in the setting of diarrhea. His diuretics and blood pressure medications were held. He reports having had worsening shortness of breath with exertion over the last several weeks, associated with chest tightness. He has had orthopnea and lower extremity edema. He denies weight gain.      Overview/Hospital Course:  In the ED today, patient was hypertensive but hemodynamically stable. Labs with Hb 8.9, wnc 6.4, plt 158, Cr 1.7, BUN 25, INR 2.0, BNP 2,348, CXR with faint reticular opacities present bilaterally which could be due to mild interstitial edema or less likely an infectious process. He was given Lasix. Patient was evaluated by ENT in ED and cauterized. Patient was admitted to hospital medicine.He was diuresed.      Interval History: Patient states he coughed up a  large clot this morning. Had episode of diarrhea.   Resumed lisinopril this am for continued hypertension    Review of Systems  Objective:     Vital Signs (Most Recent):  Temp: 98.1 °F (36.7 °C) (10/24/22 1104)  Pulse: (!) 59 (10/24/22 1221)  Resp: 18 (10/24/22 1104)  BP: (!) 89/60 (10/24/22 1104)  SpO2: 97 % (10/24/22 1104)   Vital Signs (24h Range):  Temp:  [97.1 °F (36.2 °C)-98.1 °F (36.7 °C)] 98.1 °F (36.7 °C)  Pulse:  [52-78] 59  Resp:  [16-19] 18  SpO2:  [91 %-97 %] 97 %  BP: ()/(60-76) 89/60     Weight: 79.3 kg (174 lb 13.2 oz)  Body mass index is 28.22 kg/m².    Intake/Output Summary (Last 24 hours) at 10/24/2022 1239  Last data filed at 10/24/2022 0626  Gross per 24 hour   Intake 250 ml   Output --   Net 250 ml      Physical Exam  Constitutional:       General: He is not in acute distress.     Appearance: Normal appearance. He is not toxic-appearing or diaphoretic.   Cardiovascular:      Rate and Rhythm: Normal rate and regular rhythm.      Heart sounds: No murmur heard.    No friction rub. No gallop.   Pulmonary:      Effort: Pulmonary effort is normal. No respiratory distress.      Breath sounds: No wheezing or rhonchi.   Abdominal:      General: Abdomen is flat. There is no distension.      Palpations: Abdomen is soft.      Tenderness: There is no abdominal tenderness. There is no guarding or rebound.   Musculoskeletal:      Right lower leg: No edema.      Left lower leg: No edema.   Skin:     General: Skin is warm and dry.   Neurological:      Mental Status: He is alert.       MELD-Na score: 20 at 10/24/2022  6:14 AM  MELD score: 20 at 10/24/2022  6:14 AM  Calculated from:  Serum Creatinine: 1.7 mg/dL at 10/24/2022  6:14 AM  Serum Sodium: 138 mmol/L (Using max of 137 mmol/L) at 10/24/2022  6:14 AM  Total Bilirubin: 0.5 mg/dL (Using min of 1 mg/dL) at 10/23/2022  9:22 AM  INR(ratio): 2.1 at 10/24/2022  6:14 AM  Age: 82 years    Significant Labs:  CBC:  Recent Labs   Lab 10/23/22  0922  10/24/22  0614   WBC 6.42 6.12   HGB 8.9* 9.8*   HCT 27.7* 30.1*    175     CMP:  Recent Labs   Lab 10/23/22  0922 10/24/22  0614    138   K 5.1 4.4   * 108   CO2 20* 22*    92   BUN 25* 25*   CREATININE 1.7* 1.7*   CALCIUM 9.4 10.5   PROT 7.2  --    ALBUMIN 3.4*  --    BILITOT 0.5  --    ALKPHOS 73  --    AST 22  --    ALT 19  --    ANIONGAP 6* 8     PTINR:  Recent Labs   Lab 10/23/22  0922 10/24/22  0614   INR 2.0* 2.1*       Significant Procedures:   Dobutamine Stress Test with Color Flow: No results found for this or any previous visit.      Assessment/Plan:      * Acute anterior epistaxis  ENT consulted, appreciate assistance  - Fibrillar placed in L nare, dissolvable packing no need for removal or abx while in place   - Nasal saline to bilateral nares q 4 hrs   - Afrin nasal spray PRN epistaxis  - Avoid nasal cannula; recommend humidified 02   - Patient recommendations: keep head of bed elevated, no nose blowing, sneeze through an open mouth, avoid straining, treat HTN  - Counseled on management of acute epistaxis if occurs again while at home - use afrin and hold pressure for 15 minutes       Acute on chronic heart failure   82 y.o. male with CAD s/p CABG, PCI (11/2021), mechanical aortic valve (2014), HFmEF, CVA, CKD III, HTN, who presented with epistaxis.  Reports having had worsening shortness of breath with exertion over the last several weeks, orthopnea and lower extremity edema. BNP 2,348, CXR with faint reticular opacities present bilaterally  Likely secondary to holding bumex  - Lasix 80 IV BID, transitioned back to bumex when able  - Strict I/Os, daily weights, Low Na diet    H/O mechanical aortic valve replacement  Long-term use of anticoagulation  Anemia  Resume warfarin  Pharmacy consulted to assist with dosing    History of gout  Continue allopurinol      Renovascular hypertension  Continue imdur  Cautious resumption of coreg  Patient was also previously taking  lisinopril before dc several weeks ago in setting of diarrhea/syncope      Hyperlipidemia associated with type 2 diabetes mellitus  Continue statin    VTE Risk Mitigation (From admission, onward)         Ordered     warfarin tablet 7.5 mg  Once per day on Sun Thu        See Hyperspace for full Linked Orders Report.    10/24/22 1222     warfarin (COUMADIN) tablet 5 mg  Once per day on Mon Tue Wed Fri Sat        See Hyperspace for full Linked Orders Report.    10/24/22 1222     IP VTE HIGH RISK PATIENT  Once         10/23/22 1343     Place sequential compression device  Until discontinued         10/23/22 1343                Discharge Planning   ASTER:      Code Status: Full Code   Is the patient medically ready for discharge?:     Reason for patient still in hospital (select all that apply): Patient trending condition and Treatment  Discharge Plan A: Home with family          Oscar Herring MD  Department of Hospital Medicine   [facility] Surg

## 2022-10-24 NOTE — HOSPITAL COURSE
In the ED today, patient was hypertensive but hemodynamically stable. Labs with Hb 8.9, wnc 6.4, plt 158, Cr 1.7, BUN 25, INR 2.0, BNP 2,348, CXR with faint reticular opacities present bilaterally which could be due to mild interstitial edema or less likely an infectious process. He was given Lasix. Patient was evaluated by ENT in ED and cauterized. Patient was admitted to hospital medicine.He was diuresed. His shortness of breath improved. He did not have further epistaxis and warfarin was resumed and therapeutic. Patient deemed ready for discharge. Plan discussed with pt, who was agreeable and amenable; medications were discussed and reviewed, outpatient follow-up arranged, ER precautions were given, all questions were answered to the pt's satisfaction, and Dariel Urbina  was subsequently discharged.

## 2022-10-24 NOTE — PROGRESS NOTES
"PharmD Consult received for:    1.) Admit medication history/reconciliation: Completed. Please see note titled, "Pharmacy Med Rec."    2.) Renally adjust all medications:  Estimated Creatinine Clearance: 33.7 mL/min (A) (based on SCr of 1.7 mg/dL (H)). Medications reviewed, no adjustments required.  CKD3, Scr around baseline. CTM.    3.) Warfarin dosing (home regimen):       PHARMACY CONSULT NOTE: WARFARIN  Dariel Urbina is a 82 y.o. male on warfarin therapy for Mechanical Heart Valve. PharmD has been consulted for warfarin dosing.    Current order: Warfarin 7.5 mg PO daily   Home dose: Warfarin 7.5 mg on [place], Thu; warfarin 5 mg all other days.   Coumadin clinic enrollment: Active  INR goal: 2-3 (per Coumadin Clinic)    Lab Results   Component Value Date    INR 2.1 (H) 10/24/2022    INR 2.0 (H) 10/23/2022    INR 2.0 (H) 10/12/2022     Significant drug interactions: ---  Diet:  Low Sodium, 2 g  without supplements     Recommendation(s):   Recommend resuming home warfarin regimen as above.  INR of 2.1 is within therapeutic goal.  Trend PT-INR daily.   Pharmacy will continue to follow and monitor warfarin.    Thank you for the consult,  Harjit Arzola, PharmD, BCPS  Ext. [phone]    **Note: Consults are reviewed Monday-Friday 7:00am-3:30pm. THE ABOVE RECOMMENDATIONS ARE ONLY SUGGESTED.THE RECOMMENDATIONS SHOULD BE CONSIDERED IN CONJUNCTION WITH ALL PATIENT FACTORS. **      "

## 2022-10-25 ENCOUNTER — TELEPHONE (OUTPATIENT)
Dept: INTERNAL MEDICINE | Facility: CLINIC | Age: 82
End: 2022-10-25
Payer: MEDICARE

## 2022-10-25 VITALS
SYSTOLIC BLOOD PRESSURE: 99 MMHG | DIASTOLIC BLOOD PRESSURE: 52 MMHG | OXYGEN SATURATION: 97 % | WEIGHT: 174.81 LBS | RESPIRATION RATE: 18 BRPM | HEART RATE: 58 BPM | HEIGHT: 66 IN | BODY MASS INDEX: 28.09 KG/M2 | TEMPERATURE: 98 F

## 2022-10-25 LAB
ANION GAP SERPL CALC-SCNC: 7 MMOL/L (ref 8–16)
BASOPHILS # BLD AUTO: 0.04 K/UL (ref 0–0.2)
BASOPHILS NFR BLD: 0.7 % (ref 0–1.9)
BUN SERPL-MCNC: 39 MG/DL (ref 8–23)
CALCIUM SERPL-MCNC: 9.7 MG/DL (ref 8.7–10.5)
CHLORIDE SERPL-SCNC: 104 MMOL/L (ref 95–110)
CO2 SERPL-SCNC: 23 MMOL/L (ref 23–29)
CREAT SERPL-MCNC: 2.1 MG/DL (ref 0.5–1.4)
DIFFERENTIAL METHOD: ABNORMAL
EOSINOPHIL # BLD AUTO: 0.1 K/UL (ref 0–0.5)
EOSINOPHIL NFR BLD: 2 % (ref 0–8)
ERYTHROCYTE [DISTWIDTH] IN BLOOD BY AUTOMATED COUNT: 15.9 % (ref 11.5–14.5)
EST. GFR  (NO RACE VARIABLE): 31 ML/MIN/1.73 M^2
GLUCOSE SERPL-MCNC: 88 MG/DL (ref 70–110)
HCT VFR BLD AUTO: 30.1 % (ref 40–54)
HGB BLD-MCNC: 9.7 G/DL (ref 14–18)
IMM GRANULOCYTES # BLD AUTO: 0.03 K/UL (ref 0–0.04)
IMM GRANULOCYTES NFR BLD AUTO: 0.5 % (ref 0–0.5)
INR PPP: 2.1 (ref 0.8–1.2)
LYMPHOCYTES # BLD AUTO: 1.6 K/UL (ref 1–4.8)
LYMPHOCYTES NFR BLD: 26.2 % (ref 18–48)
MCH RBC QN AUTO: 30.5 PG (ref 27–31)
MCHC RBC AUTO-ENTMCNC: 32.2 G/DL (ref 32–36)
MCV RBC AUTO: 95 FL (ref 82–98)
MONOCYTES # BLD AUTO: 0.7 K/UL (ref 0.3–1)
MONOCYTES NFR BLD: 12.3 % (ref 4–15)
NEUTROPHILS # BLD AUTO: 3.5 K/UL (ref 1.8–7.7)
NEUTROPHILS NFR BLD: 58.3 % (ref 38–73)
NRBC BLD-RTO: 0 /100 WBC
PLATELET # BLD AUTO: 183 K/UL (ref 150–450)
PMV BLD AUTO: 11.5 FL (ref 9.2–12.9)
POTASSIUM SERPL-SCNC: 4.5 MMOL/L (ref 3.5–5.1)
PROTHROMBIN TIME: 20.9 SEC (ref 9–12.5)
RBC # BLD AUTO: 3.18 M/UL (ref 4.6–6.2)
SODIUM SERPL-SCNC: 134 MMOL/L (ref 136–145)
WBC # BLD AUTO: 6.03 K/UL (ref 3.9–12.7)

## 2022-10-25 PROCEDURE — 85025 COMPLETE CBC W/AUTO DIFF WBC: CPT | Performed by: STUDENT IN AN ORGANIZED HEALTH CARE EDUCATION/TRAINING PROGRAM

## 2022-10-25 PROCEDURE — 80048 BASIC METABOLIC PNL TOTAL CA: CPT | Performed by: STUDENT IN AN ORGANIZED HEALTH CARE EDUCATION/TRAINING PROGRAM

## 2022-10-25 PROCEDURE — 1111F PR DISCHARGE MEDS RECONCILED W/ CURRENT OUTPATIENT MED LIST: ICD-10-PCS | Mod: CPTII,,, | Performed by: STUDENT IN AN ORGANIZED HEALTH CARE EDUCATION/TRAINING PROGRAM

## 2022-10-25 PROCEDURE — 99239 PR HOSPITAL DISCHARGE DAY,>30 MIN: ICD-10-PCS | Mod: ,,, | Performed by: STUDENT IN AN ORGANIZED HEALTH CARE EDUCATION/TRAINING PROGRAM

## 2022-10-25 PROCEDURE — 85610 PROTHROMBIN TIME: CPT | Performed by: STUDENT IN AN ORGANIZED HEALTH CARE EDUCATION/TRAINING PROGRAM

## 2022-10-25 PROCEDURE — 25000003 PHARM REV CODE 250: Performed by: STUDENT IN AN ORGANIZED HEALTH CARE EDUCATION/TRAINING PROGRAM

## 2022-10-25 PROCEDURE — 36415 COLL VENOUS BLD VENIPUNCTURE: CPT | Performed by: STUDENT IN AN ORGANIZED HEALTH CARE EDUCATION/TRAINING PROGRAM

## 2022-10-25 PROCEDURE — 1111F DSCHRG MED/CURRENT MED MERGE: CPT | Mod: CPTII,,, | Performed by: STUDENT IN AN ORGANIZED HEALTH CARE EDUCATION/TRAINING PROGRAM

## 2022-10-25 PROCEDURE — 99239 HOSP IP/OBS DSCHRG MGMT >30: CPT | Mod: ,,, | Performed by: STUDENT IN AN ORGANIZED HEALTH CARE EDUCATION/TRAINING PROGRAM

## 2022-10-25 RX ORDER — CARVEDILOL 12.5 MG/1
6.25 TABLET ORAL 2 TIMES DAILY WITH MEALS
Qty: 30 TABLET | Refills: 1 | Status: SHIPPED | OUTPATIENT
Start: 2022-10-25 | End: 2022-10-28

## 2022-10-25 RX ORDER — BUMETANIDE 1 MG/1
1 TABLET ORAL DAILY
Qty: 30 TABLET | Refills: 11 | Status: SHIPPED | OUTPATIENT
Start: 2022-10-25 | End: 2022-12-06 | Stop reason: SDUPTHER

## 2022-10-25 RX ORDER — LISINOPRIL 2.5 MG/1
2.5 TABLET ORAL DAILY
Qty: 30 TABLET | Refills: 1 | Status: SHIPPED | OUTPATIENT
Start: 2022-10-25 | End: 2022-12-06 | Stop reason: SDUPTHER

## 2022-10-25 RX ORDER — WARFARIN SODIUM 5 MG/1
TABLET ORAL
Qty: 143 TABLET | Refills: 0 | Status: ON HOLD | OUTPATIENT
Start: 2022-10-25 | End: 2022-12-25 | Stop reason: HOSPADM

## 2022-10-25 RX ADMIN — ALLOPURINOL 100 MG: 100 TABLET ORAL at 09:10

## 2022-10-25 RX ADMIN — CLOPIDOGREL BISULFATE 75 MG: 75 TABLET ORAL at 09:10

## 2022-10-25 RX ADMIN — BUMETANIDE 1 MG: 1 TABLET ORAL at 08:10

## 2022-10-25 RX ADMIN — ISOSORBIDE MONONITRATE 120 MG: 60 TABLET, EXTENDED RELEASE ORAL at 09:10

## 2022-10-25 RX ADMIN — CARVEDILOL 6.25 MG: 6.25 TABLET, FILM COATED ORAL at 09:10

## 2022-10-25 RX ADMIN — LISINOPRIL 2.5 MG: 2.5 TABLET ORAL at 09:10

## 2022-10-25 NOTE — PROGRESS NOTES
IV access and tele removed, wife request a wheelchair to take him out to the automobile I offered to assist but she declined, pt left [facility] in wheelchair propelled bu his wife with all of his personal belongings.they voice no complaints or concerns.

## 2022-10-25 NOTE — PLAN OF CARE
Problem: Adult Inpatient Plan of Care  Goal: Plan of Care Review  Outcome: Ongoing, Progressing  Goal: Patient-Specific Goal (Individualized)  Outcome: Ongoing, Progressing  Goal: Absence of Hospital-Acquired Illness or Injury  Outcome: Ongoing, Progressing  Goal: Optimal Comfort and Wellbeing  Outcome: Ongoing, Progressing  Goal: Readiness for Transition of Care  Outcome: Ongoing, Progressing     Problem: Adult Inpatient Plan of Care  Goal: Plan of Care Review  Outcome: Ongoing, Progressing     Problem: Adult Inpatient Plan of Care  Goal: Patient-Specific Goal (Individualized)  Outcome: Ongoing, Progressing     Problem: Adult Inpatient Plan of Care  Goal: Optimal Comfort and Wellbeing  Outcome: Ongoing, Progressing     Problem: Adult Inpatient Plan of Care  Goal: Readiness for Transition of Care  Outcome: Ongoing, Progressing

## 2022-10-25 NOTE — TELEPHONE ENCOUNTER
Left pt a voicemail instructing him to call the office back so that he can be assisted with scheduling hosp f/u.    Lory GALLAGHER

## 2022-10-25 NOTE — PLAN OF CARE
Problem: Adult Inpatient Plan of Care  Goal: Plan of Care Review  Outcome: Met     Problem: Adult Inpatient Plan of Care  Goal: Patient-Specific Goal (Individualized)  Outcome: Met     Problem: Adult Inpatient Plan of Care  Goal: Absence of Hospital-Acquired Illness or Injury  Outcome: Met     Problem: Adult Inpatient Plan of Care  Goal: Optimal Comfort and Wellbeing  Outcome: Met     Problem: Adult Inpatient Plan of Care  Goal: Readiness for Transition of Care  Outcome: Met     Problem: Diabetes Comorbidity  Goal: Blood Glucose Level Within Targeted Range  Outcome: Met     Problem: Fall Injury Risk  Goal: Absence of Fall and Fall-Related Injury  Outcome: Met   Pt is JOJO CONROY, he has been up in the room and ambulating in the room and the hallway, plan of care as well as discharge instructions reviewed with pt and his wife. I stressed the importance of keeping follow up appointments, I discussed the importance of taking his medications as ordered especially the coumadin. I explained to them the importance of having blood drawn to check the levels in the blood pt is familiar with this because he has been on the coumadin for a while due to mechanical valve replacement. Questions answered and both verbalized their understanding.

## 2022-10-25 NOTE — PLAN OF CARE
[facility] - Med Surg  Discharge Final Note    Primary Care Provider: Devon Langston MD    Expected Discharge Date: 10/25/2022    Future Appointments   Date Time Provider Department Center   11/14/2022  9:40 AM Devon Langston Jr., MD [facility] IM [facility] [facility]   11/14/2022 11:10 AM Alia Mckeon MD [facility] O[address]THAL [address]   11/21/2022  5:00 PM Sergei Ayon MD [facility] GASTRO [address]   12/21/2022 11:20 AM Brant Devine MD [facility] CARDIO [address]        Final Discharge Note (most recent)       Final Note - 10/25/22 1234          Final Note    Assessment Type Final Discharge Note     Anticipated Discharge Disposition Home or Self Care     What phone number can be called within the next 1-3 days to see how you are doing after discharge? [phone]     Hospital Resources/Appts/Education Provided Appointments scheduled and added to AVS        Post-Acute Status    Discharge Delays None known at this time                     Important Message from Medicare  Important Message from Medicare regarding Discharge Appeal Rights: Given to patient/caregiver, Explained to patient/caregiver, Signed/date by patient/caregiver     Date IMM was signed: 10/25/22  Time IMM was signed: 0919    Contact Info       Devon Langston MD   Specialty: Internal Medicine   Relationship: PCP - General    [address]  [facility] [id number]   Phone: [phone]       Next Steps: Follow up on 11/14/2022    Instructions: Pt has a PCP Nov 14th for 940am. They will call Pt to see if they can see Pt sooner per

## 2022-10-25 NOTE — PROGRESS NOTES
Administered meds as ordered, encouraged pt to use the urinal to void so accurate intake and output could be monitored. Vital signs stable, pt has been kept safe, free from falls and injury, bed is in the low locked position with call light in easy reach.Pt did have a very small amount of blood from nose, he is on coumadin, bleeding monitored closely and pt has been encouraged to avoid picking at his nose which he declined. ice pack applied and closely monitored.

## 2022-10-25 NOTE — CONSULTS
"PharmD Consult received for:     1.) Admit medication history/reconciliation: Completed. Please see note titled, "Pharmacy Med Rec."     2.) Renally adjust all medications:  Estimated Creatinine Clearance: 27.3 mL/min (A) (based on SCr of 2.1 mg/dL (H)). Medications reviewed.  JIM on CKD3. Scr 1.7 > 2.1 mg/dL today.      3.) Warfarin dosing (home regimen):       PHARMACY CONSULT NOTE: WARFARIN  Dariel Urbina is a 82 y.o. male on warfarin therapy for Mechanical Heart Valve. PharmD has been consulted for warfarin dosing.     Current order: Warfarin 7.5 mg on Montano, Thu; warfarin 5 mg all other days.   Home dose: Warfarin 7.5 mg on Montano, Thu; warfarin 5 mg all other days.   Coumadin clinic enrollment: Active  INR goal: 2-3 (per Coumadin Clinic)    Recent Labs   Lab 10/23/22  0922 10/24/22  0614 10/25/22  0522   INR 2.0* 2.1* 2.1*       Significant drug interactions: ---  Diet:  Low Sodium, 2 g  without supplements      Recommendation(s):   Recommend continuing home warfarin regimen as above.  INR of 2.1 is within therapeutic goal.  Trend PT-INR daily.   Pharmacy will continue to follow and monitor warfarin.     Thank you for the consult,  Harjit Arzola, PharmD, BCPS  Ext. [phone]     **Note: Consults are reviewed Monday-Friday 7:00am-3:30pm. THE ABOVE RECOMMENDATIONS ARE ONLY SUGGESTED.THE RECOMMENDATIONS SHOULD BE CONSIDERED IN CONJUNCTION WITH ALL PATIENT FACTORS. **  "

## 2022-10-25 NOTE — TELEPHONE ENCOUNTER
----- Message from Joseph Price sent at 10/25/2022 12:46 PM CDT -----  Contact: [phone]  Pt needs a call back about hosp fu discharge today 10/25/22 needs to be seen by 11/01/22. Please call pt back.

## 2022-10-25 NOTE — TELEPHONE ENCOUNTER
----- Message from Joseph Price sent at 10/25/2022  2:51 PM CDT -----  Contact: [phone]  Pt missed a call and would like a call back.

## 2022-10-26 ENCOUNTER — PATIENT OUTREACH (OUTPATIENT)
Dept: ADMINISTRATIVE | Facility: CLINIC | Age: 82
End: 2022-10-26
Payer: MEDICARE

## 2022-10-26 ENCOUNTER — PES CALL (OUTPATIENT)
Dept: ADMINISTRATIVE | Facility: OTHER | Age: 82
End: 2022-10-26
Payer: MEDICARE

## 2022-10-26 NOTE — PROGRESS NOTES
C3 nurse spoke with Dariel Urbina 's wife for a TCC post hospital discharge follow up call. The patient has a scheduled [facility] appointment with Bill Espinosa on 10/28/22 @ 0900.

## 2022-10-26 NOTE — PROGRESS NOTES
10/26/2--pt admitted for management of acute/chronic CHF.  He was diuresed & INR has jumped up to therapeutic after preparation for procedure. Recommend pt to continue maintenance regimen, should have d/c'd lovenox & re-assess w/ upcoming appts.

## 2022-10-27 NOTE — PROGRESS NOTES
HOSPITAL FOLLOWUP NOTE (discharged on 10/26)    Discharge note information (in italics) was reviewed in detail and discussed with the patient:   Acute anterior epistaxis  ENT consulted, appreciate assistance  - Fibrillar placed in L nare, dissolvable packing no need for removal or abx while in place   - Nasal saline to bilateral nares q 4 hrs   - Afrin nasal spray PRN epistaxis  - Avoid nasal cannula; recommend humidified 02   - Patient recommendations: keep head of bed elevated, no nose blowing, sneeze through an open mouth, avoid straining, treat HTN  - Counseled on management of acute epistaxis if occurs again while at home - use afrin and hold pressure for 15 minutes         Acute on chronic heart failure   82 y.o. male with CAD s/p CABG, PCI (11/2021), mechanical aortic valve (2014), HFmEF, CVA, CKD III, HTN, who presented with epistaxis.  Reports having had worsening shortness of breath with exertion over the last several weeks, orthopnea and lower extremity edema. BNP 2,348, CXR with faint reticular opacities present bilaterally  Likely secondary to holding bumex  - Lasix 80 IV BID, transitioned back to bumex when able  - Strict I/Os, daily weights, Low Na diet    PMFSH: all information reviewed and updated as necessary during this encounter.  Medication list reviewed and reconciled.    He is still having nose bleeds every other day or so. He's been using nasal saline daily and afrin as needed and directed. He is still on his plavix and had a vascular procedure planned but had to cancel because of the recent hospital stays. He is on coumadin for life because of his mechanical heart valve.      Review of Systems   Constitutional: Negative.    HENT:  Positive for nosebleeds.    Respiratory: Negative.     Physical Exam  Constitutional:       Appearance: Normal appearance.   Cardiovascular:      Rate and Rhythm: Normal rate and regular rhythm.   Pulmonary:      Effort: Pulmonary effort is normal.   Neurological:       Mental Status: He is alert.      Assessment/plan:  1. Hospital discharge follow-up    2. History of epistaxis  Pt. Educated on how to stop nose bleeds at home.   Continue with afrin and nasal saline. Avoid nasal traumas.   Hold plavix for 2 weeks to see if this can help.  He can discuss this with his cardiologist also.     3. Congestive heart failure, unspecified HF chronicity, unspecified heart failure type  His BP was elevated today and has been elevated at home. He has been cutting the 12.5 tabs in half as directed on discharge but before admit he was on 12.5 BID. We will adjust him back to his normal dose today. Continue BP monitoring and f/u with cardiologist and PCP.   -     carvediloL (COREG) 12.5 MG tablet; Take 1 tablet (12.5 mg total) by mouth 2 (two) times daily with meals.  Dispense: 180 tablet; Refill: 0         No follow-ups on file.  Medication List with Changes/Refills   Current Medications    ALLOPURINOL (ZYLOPRIM) 100 MG TABLET    TAKE 1 TABLET EVERY DAY    BUMETANIDE (BUMEX) 1 MG TABLET    Take 1 tablet (1 mg total) by mouth once daily.    CLOPIDOGREL (PLAVIX) 75 MG TABLET    Take 1 tablet (75 mg total) by mouth once daily.    GLIMEPIRIDE (AMARYL) 1 MG TABLET    Take 1 mg by mouth before breakfast.    ISOSORBIDE MONONITRATE (IMDUR) 120 MG 24 HR TABLET    Take 1 tablet (120 mg total) by mouth once daily.    LACTOSE-REDUCED FOOD (ENSURE ORAL)    Take by mouth. Take 1-2 cans by mouth once daily    LISINOPRIL (PRINIVIL,ZESTRIL) 2.5 MG TABLET    Take 1 tablet (2.5 mg total) by mouth once daily.    LOPERAMIDE (IMODIUM) 2 MG CAPSULE    Take 2 mg by mouth 4 (four) times daily as needed for Diarrhea.    OMEGA-3 ACID ETHYL ESTERS (LOVAZA) 1 GRAM CAPSULE    Take 2 capsules (2 g total) by mouth 2 (two) times daily.    PREDNISOLON/GATIFLOX/BROMFENAC (PREDNISOL ACE-GATIFLOX-BROMFEN) 1-0.5-0.075 % DRPS    Apply 1 drop to eye 3 (three) times daily. One drop 3 times a day in surgical eye    ROSUVASTATIN  (CRESTOR) 40 MG TAB    TAKE 1 TABLET EVERY EVENING.    WARFARIN (COUMADIN) 5 MG TABLET    Take 1 tablet (5 mg) by mouth Monday, Tuesday, Wednesday, Friday & Saturday then 1.5 tablets (7.5mg) by mouth all other days (Thurs & Sun) as instructed by Coumadin Clinic.   Changed and/or Refilled Medications    Modified Medication Previous Medication    CARVEDILOL (COREG) 12.5 MG TABLET carvediloL (COREG) 12.5 MG tablet       Take 1 tablet (12.5 mg total) by mouth 2 (two) times daily with meals.    Take 0.5 tablets (6.25 mg total) by mouth 2 (two) times daily with meals.        Transitional Care Note (discharged 10/25)    Family and/or Caretaker present at visit?  Yes.  Diagnostic tests reviewed/disposition: No diagnosic tests pending after this hospitalization.  Disease/illness education: nose bleeds and anticoagulants/antiplatelet meds.   Home health/community services discussion/referrals: Patient does not have home health established from hospital visit.  They do not need home health.  If needed, we will set up home health for the patient.   Establishment or re-establishment of referral orders for community resources: No other necessary community resources.   Discussion with other health care providers: No discussion with other health care providers necessary.

## 2022-10-28 ENCOUNTER — IMMUNIZATION (OUTPATIENT)
Dept: INTERNAL MEDICINE | Facility: CLINIC | Age: 82
End: 2022-10-28
Payer: MEDICARE

## 2022-10-28 ENCOUNTER — LAB VISIT (OUTPATIENT)
Dept: LAB | Facility: HOSPITAL | Age: 82
End: 2022-10-28
Attending: INTERNAL MEDICINE
Payer: MEDICARE

## 2022-10-28 ENCOUNTER — OFFICE VISIT (OUTPATIENT)
Dept: INTERNAL MEDICINE | Facility: CLINIC | Age: 82
End: 2022-10-28
Payer: MEDICARE

## 2022-10-28 ENCOUNTER — ANTI-COAG VISIT (OUTPATIENT)
Dept: CARDIOLOGY | Facility: CLINIC | Age: 82
End: 2022-10-28
Payer: MEDICARE

## 2022-10-28 VITALS
BODY MASS INDEX: 28.03 KG/M2 | HEIGHT: 66 IN | OXYGEN SATURATION: 96 % | WEIGHT: 174.38 LBS | HEART RATE: 66 BPM | SYSTOLIC BLOOD PRESSURE: 150 MMHG | DIASTOLIC BLOOD PRESSURE: 62 MMHG

## 2022-10-28 DIAGNOSIS — Z79.01 LONG TERM CURRENT USE OF ANTICOAGULANT THERAPY: Primary | ICD-10-CM

## 2022-10-28 DIAGNOSIS — Z09 HOSPITAL DISCHARGE FOLLOW-UP: Primary | ICD-10-CM

## 2022-10-28 DIAGNOSIS — Z79.01 LONG TERM CURRENT USE OF ANTICOAGULANT THERAPY: ICD-10-CM

## 2022-10-28 DIAGNOSIS — Z87.898 HISTORY OF EPISTAXIS: ICD-10-CM

## 2022-10-28 DIAGNOSIS — Z95.2 H/O MECHANICAL AORTIC VALVE REPLACEMENT: ICD-10-CM

## 2022-10-28 DIAGNOSIS — I50.9 CONGESTIVE HEART FAILURE, UNSPECIFIED HF CHRONICITY, UNSPECIFIED HEART FAILURE TYPE: ICD-10-CM

## 2022-10-28 LAB
INR PPP: 1.6 (ref 0.8–1.2)
PROTHROMBIN TIME: 16.3 SEC (ref 9–12.5)

## 2022-10-28 PROCEDURE — 3078F PR MOST RECENT DIASTOLIC BLOOD PRESSURE < 80 MM HG: ICD-10-PCS | Mod: CPTII,S$GLB,, | Performed by: INTERNAL MEDICINE

## 2022-10-28 PROCEDURE — 3078F DIAST BP <80 MM HG: CPT | Mod: CPTII,S$GLB,, | Performed by: INTERNAL MEDICINE

## 2022-10-28 PROCEDURE — 3288F PR FALLS RISK ASSESSMENT DOCUMENTED: ICD-10-PCS | Mod: CPTII,S$GLB,, | Performed by: INTERNAL MEDICINE

## 2022-10-28 PROCEDURE — 90694 FLU VACCINE - QUADRIVALENT - ADJUVANTED: ICD-10-PCS | Mod: S$GLB,,, | Performed by: INTERNAL MEDICINE

## 2022-10-28 PROCEDURE — 1159F MED LIST DOCD IN RCRD: CPT | Mod: CPTII,S$GLB,, | Performed by: INTERNAL MEDICINE

## 2022-10-28 PROCEDURE — G0008 FLU VACCINE - QUADRIVALENT - ADJUVANTED: ICD-10-PCS | Mod: S$GLB,,, | Performed by: INTERNAL MEDICINE

## 2022-10-28 PROCEDURE — 85610 PROTHROMBIN TIME: CPT | Performed by: INTERNAL MEDICINE

## 2022-10-28 PROCEDURE — 99496 TRANSJ CARE MGMT HIGH F2F 7D: CPT | Mod: S$GLB,,, | Performed by: INTERNAL MEDICINE

## 2022-10-28 PROCEDURE — 1101F PR PT FALLS ASSESS DOC 0-1 FALLS W/OUT INJ PAST YR: ICD-10-PCS | Mod: CPTII,S$GLB,, | Performed by: INTERNAL MEDICINE

## 2022-10-28 PROCEDURE — 93793 PR ANTICOAGULANT MGMT FOR PT TAKING WARFARIN: ICD-10-PCS | Mod: S$GLB,,,

## 2022-10-28 PROCEDURE — 1159F PR MEDICATION LIST DOCUMENTED IN MEDICAL RECORD: ICD-10-PCS | Mod: CPTII,S$GLB,, | Performed by: INTERNAL MEDICINE

## 2022-10-28 PROCEDURE — G0008 ADMIN INFLUENZA VIRUS VAC: HCPCS | Mod: S$GLB,,, | Performed by: INTERNAL MEDICINE

## 2022-10-28 PROCEDURE — 3288F FALL RISK ASSESSMENT DOCD: CPT | Mod: CPTII,S$GLB,, | Performed by: INTERNAL MEDICINE

## 2022-10-28 PROCEDURE — 3077F PR MOST RECENT SYSTOLIC BLOOD PRESSURE >= 140 MM HG: ICD-10-PCS | Mod: CPTII,S$GLB,, | Performed by: INTERNAL MEDICINE

## 2022-10-28 PROCEDURE — 93793 ANTICOAG MGMT PT WARFARIN: CPT | Mod: S$GLB,,,

## 2022-10-28 PROCEDURE — 3077F SYST BP >= 140 MM HG: CPT | Mod: CPTII,S$GLB,, | Performed by: INTERNAL MEDICINE

## 2022-10-28 PROCEDURE — 1126F AMNT PAIN NOTED NONE PRSNT: CPT | Mod: CPTII,S$GLB,, | Performed by: INTERNAL MEDICINE

## 2022-10-28 PROCEDURE — 99999 PR PBB SHADOW E&M-EST. PATIENT-LVL IV: CPT | Mod: PBBFAC,,, | Performed by: INTERNAL MEDICINE

## 2022-10-28 PROCEDURE — 36415 COLL VENOUS BLD VENIPUNCTURE: CPT | Performed by: INTERNAL MEDICINE

## 2022-10-28 PROCEDURE — 3072F PR LOW RISK FOR RETINOPATHY: ICD-10-PCS | Mod: CPTII,S$GLB,, | Performed by: INTERNAL MEDICINE

## 2022-10-28 PROCEDURE — 1126F PR PAIN SEVERITY QUANTIFIED, NO PAIN PRESENT: ICD-10-PCS | Mod: CPTII,S$GLB,, | Performed by: INTERNAL MEDICINE

## 2022-10-28 PROCEDURE — 99496 TRANSITIONAL CARE MANAGE SERVICE 7 DAY DISCHARGE: ICD-10-PCS | Mod: S$GLB,,, | Performed by: INTERNAL MEDICINE

## 2022-10-28 PROCEDURE — 90694 VACC AIIV4 NO PRSRV 0.5ML IM: CPT | Mod: S$GLB,,, | Performed by: INTERNAL MEDICINE

## 2022-10-28 PROCEDURE — 1160F PR REVIEW ALL MEDS BY PRESCRIBER/CLIN PHARMACIST DOCUMENTED: ICD-10-PCS | Mod: CPTII,S$GLB,, | Performed by: INTERNAL MEDICINE

## 2022-10-28 PROCEDURE — 3072F LOW RISK FOR RETINOPATHY: CPT | Mod: CPTII,S$GLB,, | Performed by: INTERNAL MEDICINE

## 2022-10-28 PROCEDURE — 1101F PT FALLS ASSESS-DOCD LE1/YR: CPT | Mod: CPTII,S$GLB,, | Performed by: INTERNAL MEDICINE

## 2022-10-28 PROCEDURE — 1160F RVW MEDS BY RX/DR IN RCRD: CPT | Mod: CPTII,S$GLB,, | Performed by: INTERNAL MEDICINE

## 2022-10-28 PROCEDURE — 99999 PR PBB SHADOW E&M-EST. PATIENT-LVL IV: ICD-10-PCS | Mod: PBBFAC,,, | Performed by: INTERNAL MEDICINE

## 2022-10-28 RX ORDER — CARVEDILOL 12.5 MG/1
12.5 TABLET ORAL 2 TIMES DAILY WITH MEALS
Qty: 180 TABLET | Refills: 0 | Status: ON HOLD
Start: 2022-10-28 | End: 2022-12-25 | Stop reason: HOSPADM

## 2022-10-28 NOTE — PROGRESS NOTES
INR not at goal. Medications, chart, and patient findings reviewed. See calendar for adjustments to dose and follow up plan.  Pt to continue lovenox until INR >/=2.

## 2022-10-31 ENCOUNTER — TELEPHONE (OUTPATIENT)
Dept: CARDIOLOGY | Facility: CLINIC | Age: 82
End: 2022-10-31
Payer: MEDICARE

## 2022-10-31 ENCOUNTER — LAB VISIT (OUTPATIENT)
Dept: LAB | Facility: HOSPITAL | Age: 82
End: 2022-10-31
Attending: INTERNAL MEDICINE
Payer: MEDICARE

## 2022-10-31 ENCOUNTER — ANTI-COAG VISIT (OUTPATIENT)
Dept: CARDIOLOGY | Facility: CLINIC | Age: 82
End: 2022-10-31
Payer: MEDICARE

## 2022-10-31 DIAGNOSIS — Z95.2 H/O MECHANICAL AORTIC VALVE REPLACEMENT: ICD-10-CM

## 2022-10-31 DIAGNOSIS — Z79.01 LONG TERM CURRENT USE OF ANTICOAGULANT THERAPY: ICD-10-CM

## 2022-10-31 DIAGNOSIS — Z79.01 LONG TERM CURRENT USE OF ANTICOAGULANT THERAPY: Primary | ICD-10-CM

## 2022-10-31 LAB
INR PPP: 1.6 (ref 0.8–1.2)
PROTHROMBIN TIME: 16.7 SEC (ref 9–12.5)

## 2022-10-31 PROCEDURE — 36415 COLL VENOUS BLD VENIPUNCTURE: CPT | Performed by: INTERNAL MEDICINE

## 2022-10-31 PROCEDURE — 93793 PR ANTICOAGULANT MGMT FOR PT TAKING WARFARIN: ICD-10-PCS | Mod: S$GLB,,,

## 2022-10-31 PROCEDURE — 93793 ANTICOAG MGMT PT WARFARIN: CPT | Mod: S$GLB,,,

## 2022-10-31 PROCEDURE — 85610 PROTHROMBIN TIME: CPT | Performed by: INTERNAL MEDICINE

## 2022-10-31 NOTE — TELEPHONE ENCOUNTER
I reached out to ,    And she agreed with The Plan regarding  Plavix.    The will stop plavix until nose bleeding has been    resolved.    See   note.      Nw                  ----- Message from Brant Devine MD sent at 10/29/2022  8:37 AM CDT -----  Yes please      Stop plavix for now      SHEBA  ----- Message -----  From: Radha Winston MA  Sent: 10/28/2022  11:34 AM CDT  To: Brant Devine MD    Dear Sheba Anna,    Mrs. Urbina,  called and stated that they had to cx procedure 10-20-22 with you, due to Nose Bleed.    Patient  was Discharged 10-26-22.    They are not ready to reschedule procedure with you yet.    Both Patient and wife  want to know if they can stop Plavix for awhile until they reschedule with you.    Pt Phone # [phone].      Please advise-    Ciara

## 2022-11-01 NOTE — PHYSICIAN QUERY
PT Name: Dariel Urbina  MR #: [id number]    DOCUMENTATION CLARIFICATION     CDS/: Iris SANDOVAL, RN             Contact information: [email or web address]    This form is a permanent document in the medical record.     Query Date: November 1, 2022    By submitting this query, we are merely seeking further clarification of documentation. Please utilize your independent clinical judgment when addressing the question(s) below.    Supporting Clinical Findings Location in Medical Record      Principal Problem:Acute anterior epistaxis    HPI:   Dariel Urbina is a 82 y.o. male with past medical history of CAD s/p CABG, PCI (11/2021), mechanical aortic valve (2014), HFmEF, CVA, CKD III, HTN, who presented with epistaxis. Patient reports long-standing history of episodes of epistaxis. He first developed L sided epistaxis more recently last week and went to clinic with ENT where it was cauterized. Then two days ago he developed bleeding on the right side, followed by profuse recurrent bleeding from the L side yesterday. He then came into the ED. Of note patient was on lovenox bridge for planned aortogram, but this was cancelled when he developed bleeding. Prior to presentation he was taking lovenox shots and coumadin managed by coumadin clinic. He also takes plavix.      Acute anterior epistaxis  ENT consulted, appreciate assistance  - Fibrillar placed in L nare, dissolvable packing no need for removal or abx while in place   - Nasal saline to bilateral nares q 4 hrs   - Afrin nasal spray PRN epistaxis  - Avoid nasal cannula; recommend humidified 02     Long-term use of anticoagulation       Hospital Medicine progress note 10/23/22       Provider, please clarify if there is any clinical correlation between Epistaxis and Warfarin, Lovenox, Plavix.           Are the conditions:      [  x] Due to or associated with each other   [  ] Unrelated to each other   [  ] Other explanation (Please Specify):  ______________   [  ] Clinically Undetermined                                                                               Please document in your progress notes daily for the duration of treatment until resolved and include in your discharge summary.

## 2022-11-02 NOTE — H&P
History    Chief complaint:  Painless progressive vision loss    Present Ilness/Diagnosis: Nuclear sclerotic Cataract    Past 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 without angina pectoris (09/11/2012), Bilateral carotid artery disease (02/09/2017), Bleeding from the nose, Bleeding nose (03/21/2018), Cataract, CKD (chronic kidney disease) stage 3, GFR 30-59 ml/min (05/27/2015), Claudication of left lower extremity (09/17/2014), Colon polyp, Encounter for blood transfusion, Gastroesophageal reflux disease without esophagitis (03/19/2018), Gastrointestinal hemorrhage associated with intestinal diverticulosis (04/01/2018), Glaucoma, H/O mechanical aortic valve replacement (09/17/2014), History of gout (09/26/2012), Hyperparathyroidism due to renal insufficiency (07/27/2015), Internal hemorrhoid (04/03/2018), Long term current use of anticoagulant therapy (09/26/2012), Mechanical heart valve present, Metabolic acidosis with normal anion gap and bicarbonate losses (03/20/2018), Mixed hyperlipidemia (09/26/2012), NSTEMI (non-ST elevated myocardial infarction) (03/21/2018), Obesity, diabetes, and hypertension syndrome (02/23/2016), PVD (peripheral vascular disease) (09/11/2012), 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    Family History/Social History: refer to chart    Allergies:   Review of patient's allergies indicates:   Allergen Reactions    Fosinopril      Intolerance- elevates potassium level      Losartan      Intolerance- elevates potassium level       Current Medications: No current facility-administered medications for  this encounter.    Current Outpatient Medications:     allopurinoL (ZYLOPRIM) 100 MG tablet, TAKE 1 TABLET EVERY DAY, Disp: 90 tablet, Rfl: 3    bumetanide (BUMEX) 1 MG tablet, Take 1 tablet (1 mg total) by mouth once daily., Disp: 30 tablet, Rfl: 11    carvediloL (COREG) 12.5 MG tablet, Take 1 tablet (12.5 mg total) by mouth 2 (two) times daily with meals., Disp: 180 tablet, Rfl: 0    clopidogreL (PLAVIX) 75 mg tablet, Take 1 tablet (75 mg total) by mouth once daily., Disp: 30 tablet, Rfl: 11    glimepiride (AMARYL) 1 MG tablet, Take 1 mg by mouth before breakfast., Disp: , Rfl:     isosorbide mononitrate (IMDUR) 120 MG 24 hr tablet, Take 1 tablet (120 mg total) by mouth once daily., Disp: 90 tablet, Rfl: 4    lactose-reduced food (ENSURE ORAL), Take by mouth. Take 1-2 cans by mouth once daily, Disp: , Rfl:     lisinopriL (PRINIVIL,ZESTRIL) 2.5 MG tablet, Take 1 tablet (2.5 mg total) by mouth once daily., Disp: 30 tablet, Rfl: 1    loperamide (IMODIUM) 2 mg capsule, Take 2 mg by mouth 4 (four) times daily as needed for Diarrhea., Disp: , Rfl:     omega-3 acid ethyl esters (LOVAZA) 1 gram capsule, Take 2 capsules (2 g total) by mouth 2 (two) times daily. (Patient taking differently: Take 1 g by mouth once daily.), Disp: 360 capsule, Rfl: 5    prednisolon/gatiflox/bromfenac (PREDNISOL ACE-GATIFLOX-BROMFEN) 1-0.5-0.075 % DrpS, Apply 1 drop to eye 3 (three) times daily. One drop 3 times a day in surgical eye (Patient not taking: Reported on 10/26/2022), Disp: 5 mL, Rfl: 3    rosuvastatin (CRESTOR) 40 MG Tab, TAKE 1 TABLET EVERY EVENING. (Patient taking differently: Take 40 mg by mouth every evening.), Disp: 90 tablet, Rfl: 3    warfarin (COUMADIN) 5 MG tablet, Take 1 tablet (5 mg) by mouth Monday, Tuesday, Wednesday, Friday & Saturday then 1.5 tablets (7.5mg) by mouth all other days (Thurs & Sun) as instructed by Coumadin Clinic., Disp: 143 tablet, Rfl: 0    Physical Exam    BP: Vital signs stable  General: No apparent  distress  HEENT: nuclear sclerotic cataract  Lungs: adequate respirations  Heart: + pulses  Abdomen: soft  Rectal/pelvic: deferred    Impression: Visually significant Cataract.    See previous clinic notes for surgical indications.    Plan: Phacoemulsification with implantation of Intraocular lens

## 2022-11-03 ENCOUNTER — LAB VISIT (OUTPATIENT)
Dept: LAB | Facility: HOSPITAL | Age: 82
End: 2022-11-03
Attending: INTERNAL MEDICINE
Payer: MEDICARE

## 2022-11-03 ENCOUNTER — ANTI-COAG VISIT (OUTPATIENT)
Dept: CARDIOLOGY | Facility: CLINIC | Age: 82
End: 2022-11-03
Payer: MEDICARE

## 2022-11-03 DIAGNOSIS — Z95.2 H/O MECHANICAL AORTIC VALVE REPLACEMENT: ICD-10-CM

## 2022-11-03 DIAGNOSIS — Z79.01 LONG TERM CURRENT USE OF ANTICOAGULANT THERAPY: Primary | ICD-10-CM

## 2022-11-03 DIAGNOSIS — Z79.01 LONG TERM CURRENT USE OF ANTICOAGULANT THERAPY: ICD-10-CM

## 2022-11-03 LAB
INR PPP: 1.8 (ref 0.8–1.2)
PROTHROMBIN TIME: 18.3 SEC (ref 9–12.5)

## 2022-11-03 PROCEDURE — 36415 COLL VENOUS BLD VENIPUNCTURE: CPT | Performed by: INTERNAL MEDICINE

## 2022-11-03 PROCEDURE — 85610 PROTHROMBIN TIME: CPT | Performed by: INTERNAL MEDICINE

## 2022-11-03 PROCEDURE — 93793 ANTICOAG MGMT PT WARFARIN: CPT | Mod: S$GLB,,,

## 2022-11-03 PROCEDURE — 93793 PR ANTICOAGULANT MGMT FOR PT TAKING WARFARIN: ICD-10-PCS | Mod: S$GLB,,,

## 2022-11-03 NOTE — PROGRESS NOTES
INR not at goal. Medications, chart, and patient findings reviewed. See calendar for adjustments to dose and follow up plan.  Recommend pt continue lovenox until INR >2.0.

## 2022-11-05 DIAGNOSIS — D50.9 IRON DEFICIENCY ANEMIA, UNSPECIFIED IRON DEFICIENCY ANEMIA TYPE: Primary | ICD-10-CM

## 2022-11-05 RX ORDER — FERROUS GLUCONATE 324(38)MG
324 TABLET ORAL
Qty: 90 TABLET | Refills: 1 | Status: SHIPPED | OUTPATIENT
Start: 2022-11-05 | End: 2023-05-04

## 2022-11-05 NOTE — DISCHARGE SUMMARY
[facility] Medicine  Discharge Summary      Patient Name: Dariel Urbina  MRN: [id number]  EMELIA: [id number]  Patient Class: IP- Inpatient  Admission Date: 10/23/2022  Hospital Length of Stay: 2 days  Discharge Date and Time: 10/25/22  Attending Physician: No att. providers found   Discharging Provider: Oscar Herring MD  Primary Care Provider: Devon Langston MD  [facility] Medicine Team: [facility] MED A Osacr Herring MD  Primary Care Team: [facility] A    HPI:   Dariel Urbina is a 82 y.o. male with past medical history of CAD s/p CABG, PCI (11/2021), mechanical aortic valve (2014), HFmEF, CVA, CKD III, HTN, who presented with epistaxis. Patient reports long-standing history of episodes of epistaxis. He first developed L sided epistaxis more recently last week and went to clinic with ENT where it was cauterized. Then two days ago he developed bleeding on the right side, followed by profuse recurrent bleeding from the L side yesterday. He then came into the ED. Of note patient was on lovenox bridge for planned aortogram, but this was cancelled when he developed bleeding. Prior to presentation he was taking lovenox shots and coumadin managed by coumadin clinic. He also takes plavix. Patient was also hospitalized 9/29- 10/1 following a syncopal episode in the setting of diarrhea. His diuretics and blood pressure medications were held. He reports having had worsening shortness of breath with exertion over the last several weeks, associated with chest tightness. He has had orthopnea and lower extremity edema. He denies weight gain.      * No surgery found *      Hospital Course:   In the ED today, patient was hypertensive but hemodynamically stable. Labs with Hb 8.9, wnc 6.4, plt 158, Cr 1.7, BUN 25, INR 2.0, BNP 2,348, CXR with faint reticular opacities present bilaterally which could be due to mild interstitial edema or less likely an infectious process. He was given Lasix.  Patient was evaluated by ENT in ED and cauterized. Patient was admitted to hospital medicine.He was diuresed. His shortness of breath improved. He did not have further epistaxis and warfarin was resumed and therapeutic. Patient deemed ready for discharge. Plan discussed with pt, who was agreeable and amenable; medications were discussed and reviewed, outpatient follow-up arranged, ER precautions were given, all questions were answered to the pt's satisfaction, and Dariel Urbina  was subsequently discharged.         Goals of Care Treatment Preferences:  Code Status: Full Code      Consults:   Consults (From admission, onward)        Status Ordering Provider     Inpatient consult to ENT  Once        Provider:  (Not yet assigned)    Completed FREDA HERNÁNDEZ          No new Assessment & Plan notes have been filed under this hospital service since the last note was generated.  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    Specialty: Internal Medicine  Why: Pt has a PCP Nov 14th for 940am. They will call Pt to see if they can see Pt sooner per  Contact information:  [address]  [facility]  [id number]  [phone]                       Patient Instructions:      Diet Cardiac     Diet renal     Notify your health care provider if you experience any of the following:  temperature >100.4     Notify your health care provider if you experience any of the following:  persistent nausea and vomiting or diarrhea     Notify your health care provider if you experience any of the following:  severe uncontrolled pain     Notify your health care provider if you experience any of the following:  difficulty breathing or increased cough     Notify your health care provider if you experience any of the following:  severe persistent headache     Notify your health care provider if you experience any of the following:  worsening rash     Notify your health care provider if you experience any of the following:  persistent dizziness, light-headedness, or visual disturbances     Notify your health care provider if you experience any of the following:  increased confusion or weakness     Activity as tolerated         Pending Diagnostic Studies:     None         Medications:  Reconciled Home Medications:      Medication List      START taking these medications    bumetanide 1 MG tablet  Commonly known as: BUMEX  Take 1 tablet (1 mg total) by mouth once daily.        CONTINUE taking these medications    allopurinoL 100 MG tablet  Commonly known as: ZYLOPRIM  TAKE 1 TABLET EVERY DAY     clopidogreL 75 mg tablet  Commonly known as: PLAVIX  Take 1 tablet (75 mg total) by mouth once daily.     ENSURE ORAL  Take by mouth. Take 1-2 cans by mouth once daily     glimepiride 1 MG tablet  Commonly known as: AMARYL  Take 1 mg by mouth before breakfast.     isosorbide mononitrate 120 MG 24 hr tablet  Commonly known as: IMDUR  Take 1 tablet (120 mg total) by mouth once daily.     lisinopriL 2.5 MG tablet  Commonly known as: PRINIVIL,ZESTRIL  Take 1 tablet (2.5 mg total) by mouth once daily.     loperamide 2 mg capsule  Commonly known as:  IMODIUM  Take 2 mg by mouth 4 (four) times daily as needed for Diarrhea.     prednisol ace-gatiflox-bromfen 1-0.5-0.075 % Drps  Apply 1 drop to eye 3 (three) times daily. One drop 3 times a day in surgical eye     rosuvastatin 40 MG Tab  Commonly known as: CRESTOR  TAKE 1 TABLET EVERY EVENING.     warfarin 5 MG tablet  Commonly known as: COUMADIN  Take 1 tablet (5 mg) by mouth Monday, Tuesday, Wednesday, Friday & Saturday then 1.5 tablets (7.5mg) by mouth all other days (Thurs & Sun) as instructed by Coumadin Clinic.        STOP taking these medications    carvediloL 12.5 MG tablet  Commonly known as: COREG        ASK your doctor about these medications    omega-3 acid ethyl esters 1 gram capsule  Commonly known as: LOVAZA  Take 2 capsules (2 g total) by mouth 2 (two) times daily.            Indwelling Lines/Drains at time of discharge:   Lines/Drains/Airways     None                 Time spent on the discharge of patient: 35 minutes         Oscar Herring MD  Department of Hospital Medicine  [facility] Surg

## 2022-11-07 ENCOUNTER — ANESTHESIA EVENT (OUTPATIENT)
Dept: SURGERY | Facility: HOSPITAL | Age: 82
End: 2022-11-07
Payer: MEDICARE

## 2022-11-07 ENCOUNTER — ANTI-COAG VISIT (OUTPATIENT)
Dept: CARDIOLOGY | Facility: CLINIC | Age: 82
End: 2022-11-07
Payer: MEDICARE

## 2022-11-07 ENCOUNTER — LAB VISIT (OUTPATIENT)
Dept: LAB | Facility: HOSPITAL | Age: 82
End: 2022-11-07
Attending: INTERNAL MEDICINE
Payer: MEDICARE

## 2022-11-07 DIAGNOSIS — Z79.01 LONG TERM CURRENT USE OF ANTICOAGULANT THERAPY: Primary | ICD-10-CM

## 2022-11-07 DIAGNOSIS — Z95.2 H/O MECHANICAL AORTIC VALVE REPLACEMENT: ICD-10-CM

## 2022-11-07 DIAGNOSIS — Z79.01 LONG TERM CURRENT USE OF ANTICOAGULANT THERAPY: ICD-10-CM

## 2022-11-07 LAB
INR PPP: 1.6 (ref 0.8–1.2)
PROTHROMBIN TIME: 16.6 SEC (ref 9–12.5)

## 2022-11-07 PROCEDURE — 93793 PR ANTICOAGULANT MGMT FOR PT TAKING WARFARIN: ICD-10-PCS | Mod: S$GLB,,,

## 2022-11-07 PROCEDURE — 36415 COLL VENOUS BLD VENIPUNCTURE: CPT | Performed by: INTERNAL MEDICINE

## 2022-11-07 PROCEDURE — 93793 ANTICOAG MGMT PT WARFARIN: CPT | Mod: S$GLB,,,

## 2022-11-07 PROCEDURE — 85610 PROTHROMBIN TIME: CPT | Performed by: INTERNAL MEDICINE

## 2022-11-07 NOTE — PROGRESS NOTES
Pt requests to use lovenox 150mg syringe to avoid having to purchase 80mg syringes.  Will advise pt to take 70mg as syringe is scored for this dose.

## 2022-11-10 ENCOUNTER — TELEPHONE (OUTPATIENT)
Dept: OPHTHALMOLOGY | Facility: CLINIC | Age: 82
End: 2022-11-10
Payer: MEDICARE

## 2022-11-10 ENCOUNTER — LAB VISIT (OUTPATIENT)
Dept: LAB | Facility: HOSPITAL | Age: 82
End: 2022-11-10
Attending: INTERNAL MEDICINE
Payer: MEDICARE

## 2022-11-10 ENCOUNTER — ANTI-COAG VISIT (OUTPATIENT)
Dept: CARDIOLOGY | Facility: CLINIC | Age: 82
End: 2022-11-10
Payer: MEDICARE

## 2022-11-10 DIAGNOSIS — Z79.01 LONG TERM CURRENT USE OF ANTICOAGULANT THERAPY: Primary | ICD-10-CM

## 2022-11-10 DIAGNOSIS — Z95.2 H/O MECHANICAL AORTIC VALVE REPLACEMENT: ICD-10-CM

## 2022-11-10 DIAGNOSIS — Z79.01 LONG TERM CURRENT USE OF ANTICOAGULANT THERAPY: ICD-10-CM

## 2022-11-10 LAB
INR PPP: 2.2 (ref 0.8–1.2)
PROTHROMBIN TIME: 22.2 SEC (ref 9–12.5)

## 2022-11-10 PROCEDURE — 85610 PROTHROMBIN TIME: CPT | Performed by: INTERNAL MEDICINE

## 2022-11-10 PROCEDURE — 93793 ANTICOAG MGMT PT WARFARIN: CPT | Mod: S$GLB,,,

## 2022-11-10 PROCEDURE — 93793 PR ANTICOAGULANT MGMT FOR PT TAKING WARFARIN: ICD-10-PCS | Mod: S$GLB,,,

## 2022-11-10 PROCEDURE — 36415 COLL VENOUS BLD VENIPUNCTURE: CPT | Performed by: INTERNAL MEDICINE

## 2022-11-10 NOTE — PROGRESS NOTES
INR at goal. Medications and chart reviewed. No changes noted to necessitate adjustment of warfarin or follow-up plan. See calendar.  Pt can d/c lovenox at this time.  Will continue to monitor closely.

## 2022-11-13 ENCOUNTER — ANESTHESIA (OUTPATIENT)
Dept: SURGERY | Facility: HOSPITAL | Age: 82
End: 2022-11-13
Payer: MEDICARE

## 2022-11-13 ENCOUNTER — HOSPITAL ENCOUNTER (OUTPATIENT)
Facility: HOSPITAL | Age: 82
Discharge: HOME | End: 2022-11-13
Attending: OPHTHALMOLOGY | Admitting: OPHTHALMOLOGY
Payer: MEDICARE

## 2022-11-13 ENCOUNTER — TELEPHONE (OUTPATIENT)
Dept: OPHTHALMOLOGY | Facility: CLINIC | Age: 82
End: 2022-11-13
Payer: MEDICARE

## 2022-11-13 VITALS
BODY MASS INDEX: 27.16 KG/M2 | HEART RATE: 52 BPM | DIASTOLIC BLOOD PRESSURE: 65 MMHG | RESPIRATION RATE: 18 BRPM | TEMPERATURE: 98 F | OXYGEN SATURATION: 99 % | SYSTOLIC BLOOD PRESSURE: 125 MMHG | WEIGHT: 169 LBS | HEIGHT: 66 IN

## 2022-11-13 DIAGNOSIS — H25.12 NUCLEAR SCLEROTIC CATARACT OF LEFT EYE: ICD-10-CM

## 2022-11-13 DIAGNOSIS — H25.12 NUCLEAR SCLEROTIC CATARACT OF LEFT EYE: Primary | ICD-10-CM

## 2022-11-13 DIAGNOSIS — H25.11 NUCLEAR SCLEROTIC CATARACT OF RIGHT EYE: Primary | ICD-10-CM

## 2022-11-13 DIAGNOSIS — H25.10 SENILE NUCLEAR SCLEROSIS: ICD-10-CM

## 2022-11-13 PROCEDURE — V2632 POST CHMBR INTRAOCULAR LENS: HCPCS | Performed by: OPHTHALMOLOGY

## 2022-11-13 PROCEDURE — D9220A PRA ANESTHESIA: Mod: ANES,,, | Performed by: ANESTHESIOLOGY

## 2022-11-13 PROCEDURE — 37000008 HC ANESTHESIA 1ST 15 MINUTES: Performed by: OPHTHALMOLOGY

## 2022-11-13 PROCEDURE — D9220A PRA ANESTHESIA: ICD-10-PCS | Mod: ANES,,, | Performed by: ANESTHESIOLOGY

## 2022-11-13 PROCEDURE — 37000009 HC ANESTHESIA EA ADD 15 MINS: Performed by: OPHTHALMOLOGY

## 2022-11-13 PROCEDURE — 66984 XCAPSL CTRC RMVL W/O ECP: CPT | Mod: LT,,, | Performed by: OPHTHALMOLOGY

## 2022-11-13 PROCEDURE — D9220A PRA ANESTHESIA: ICD-10-PCS | Mod: CRNA,,, | Performed by: NURSE ANESTHETIST, CERTIFIED REGISTERED

## 2022-11-13 PROCEDURE — 36000707: Performed by: OPHTHALMOLOGY

## 2022-11-13 PROCEDURE — 25000003 PHARM REV CODE 250: Performed by: OPHTHALMOLOGY

## 2022-11-13 PROCEDURE — 36000706: Performed by: OPHTHALMOLOGY

## 2022-11-13 PROCEDURE — 63600175 PHARM REV CODE 636 W HCPCS: Performed by: NURSE ANESTHETIST, CERTIFIED REGISTERED

## 2022-11-13 PROCEDURE — 66984 PR REMOVAL, CATARACT, W/INSRT INTRAOC LENS, W/O ENDO CYCLO: ICD-10-PCS | Mod: LT,,, | Performed by: OPHTHALMOLOGY

## 2022-11-13 PROCEDURE — 71000044 HC DOSC ROUTINE RECOVERY FIRST HOUR: Performed by: OPHTHALMOLOGY

## 2022-11-13 PROCEDURE — 25000003 PHARM REV CODE 250: Performed by: NURSE ANESTHETIST, CERTIFIED REGISTERED

## 2022-11-13 PROCEDURE — 71000015 HC POSTOP RECOV 1ST HR: Performed by: OPHTHALMOLOGY

## 2022-11-13 PROCEDURE — D9220A PRA ANESTHESIA: Mod: CRNA,,, | Performed by: NURSE ANESTHETIST, CERTIFIED REGISTERED

## 2022-11-13 DEVICE — LENS EYHANCE +20.5D: Type: IMPLANTABLE DEVICE | Site: EYE | Status: FUNCTIONAL

## 2022-11-13 RX ORDER — KETOROLAC TROMETHAMINE 5 MG/ML
1 SOLUTION OPHTHALMIC ONCE
Status: COMPLETED | OUTPATIENT
Start: 2022-11-13 | End: 2022-11-13

## 2022-11-13 RX ORDER — MOXIFLOXACIN 5 MG/ML
1 SOLUTION/ DROPS OPHTHALMIC
Status: COMPLETED | OUTPATIENT
Start: 2022-11-13 | End: 2022-11-13

## 2022-11-13 RX ORDER — TROPICAMIDE 10 MG/ML
1 SOLUTION/ DROPS OPHTHALMIC
Status: DISCONTINUED | OUTPATIENT
Start: 2022-11-13 | End: 2023-05-16

## 2022-11-13 RX ORDER — ONDANSETRON 2 MG/ML
4 INJECTION INTRAMUSCULAR; INTRAVENOUS DAILY PRN
Status: DISCONTINUED | OUTPATIENT
Start: 2022-11-13 | End: 2023-05-16

## 2022-11-13 RX ORDER — PROPARACAINE HYDROCHLORIDE 5 MG/ML
1 SOLUTION/ DROPS OPHTHALMIC
Status: DISCONTINUED | OUTPATIENT
Start: 2022-11-13 | End: 2023-05-16

## 2022-11-13 RX ORDER — LIDOCAINE HYDROCHLORIDE 10 MG/ML
INJECTION, SOLUTION EPIDURAL; INFILTRATION; INTRACAUDAL; PERINEURAL
Status: DISCONTINUED
Start: 2022-11-13 | End: 2022-11-13 | Stop reason: HOSPADM

## 2022-11-13 RX ORDER — MIDAZOLAM HYDROCHLORIDE 1 MG/ML
INJECTION, SOLUTION INTRAMUSCULAR; INTRAVENOUS
Status: DISCONTINUED | OUTPATIENT
Start: 2022-11-13 | End: 2022-11-13

## 2022-11-13 RX ORDER — LIDOCAINE HYDROCHLORIDE 10 MG/ML
INJECTION, SOLUTION EPIDURAL; INFILTRATION; INTRACAUDAL; PERINEURAL
Status: DISCONTINUED | OUTPATIENT
Start: 2022-11-13 | End: 2022-11-13 | Stop reason: HOSPADM

## 2022-11-13 RX ORDER — LIDOCAINE HYDROCHLORIDE 40 MG/ML
INJECTION, SOLUTION RETROBULBAR
Status: DISCONTINUED | OUTPATIENT
Start: 2022-11-13 | End: 2022-11-13 | Stop reason: HOSPADM

## 2022-11-13 RX ORDER — ACETAMINOPHEN 325 MG/1
650 TABLET ORAL EVERY 4 HOURS PRN
Status: DISCONTINUED | OUTPATIENT
Start: 2022-11-13 | End: 2022-11-13 | Stop reason: HOSPADM

## 2022-11-13 RX ORDER — PHENYLEPHRINE HYDROCHLORIDE 25 MG/ML
1 SOLUTION/ DROPS OPHTHALMIC
Status: DISCONTINUED | OUTPATIENT
Start: 2022-11-13 | End: 2023-05-16

## 2022-11-13 RX ADMIN — TROPICAMIDE 1 DROP: 10 SOLUTION/ DROPS OPHTHALMIC at 06:11

## 2022-11-13 RX ADMIN — PHENYLEPHRINE HYDROCHLORIDE 1 DROP: 25 SOLUTION/ DROPS OPHTHALMIC at 06:11

## 2022-11-13 RX ADMIN — MIDAZOLAM HYDROCHLORIDE 1 MG: 1 INJECTION, SOLUTION INTRAMUSCULAR; INTRAVENOUS at 08:11

## 2022-11-13 RX ADMIN — SODIUM CHLORIDE: 0.9 INJECTION, SOLUTION INTRAVENOUS at 07:11

## 2022-11-13 RX ADMIN — PROPARACAINE HYDROCHLORIDE 1 DROP: 5 SOLUTION/ DROPS OPHTHALMIC at 06:11

## 2022-11-13 RX ADMIN — MOXIFLOXACIN OPHTHALMIC 1 DROP: 5 SOLUTION/ DROPS OPHTHALMIC at 06:11

## 2022-11-13 RX ADMIN — KETOROLAC TROMETHAMINE 1 DROP: 5 SOLUTION OPHTHALMIC at 06:11

## 2022-11-13 NOTE — ANESTHESIA POSTPROCEDURE EVALUATION
Anesthesia Post Evaluation    Patient: Dariel Urbina    Procedure(s) Performed: Procedure(s) (LRB):  PHACOEMULSIFICATION, CATARACT (Left)  INSERTION, IOL PROSTHESIS (Left)    Final Anesthesia Type: MAC      Patient location during evaluation: PACU  Patient participation: Yes- Able to Participate  Level of consciousness: awake and alert  Post-procedure vital signs: reviewed and stable  Pain management: adequate  Airway patency: patent    PONV status at discharge: No PONV  Anesthetic complications: no      Cardiovascular status: hemodynamically stable  Respiratory status: spontaneous ventilation and room air  Hydration status: euvolemic  Follow-up needed           Vitals Value Taken Time   /65 11/13/22 0850   Temp 36.8 °C (98.3 °F) 11/13/22 0850   Pulse 52 11/13/22 0850   Resp 18 11/13/22 0850   SpO2 99 % 11/13/22 0850         No case tracking events are documented in the log.      Pain/Sanjeev Score: Sanjeev Score: 10 (11/13/2022  8:49 AM)

## 2022-11-13 NOTE — OP NOTE
DATE OF PROCEDURE: 11/13/2022    SURGEON: PINKY WATSON MD    PREOPERATIVE DIAGNOSIS:  Senile nuclear sclerotic cataract left eye.     POSTOPERATIVE DIAGNOSIS: Senile nuclear sclerotic cataract left eye.     PROCEDURE PERFORMED:  Phacoemulsification with placement of intraocular lens, left eye.    IMPLANT:  DIB00 20.5    ANESTHESIA:  Topical and MAC    COMPLICATIONS: none    ESTIMATED BLOOD LOSS: <1cc    SPECIMENS: none    INDICATIONS FOR PROCEDURE:   The patient has a history of painless progressive vision loss.  The patient has described difficulties with activities of daily living, which specifically include driving, which is secondary to cataract formation and progression. After we had a thorough discussion about risks, benefits, and alternatives to cataract surgery, the patient agreed to proceed with phacoemulsification and implantation of a lens in the left eye.  These risks include, but are not limited to, hemorrhage, pain, infection, need for additional surgery, need for glasses or contacts, loss of vision, or even loss of the eye.    PROCEDURE IN DETAIL:  The patient was met in the preop holding area.  Consent was confirmed to be signed.  The operative site was marked.  The patient was brought into the operating room by the anesthesia team and placed under monitored anesthesia care.  The left eye was prepped and draped in a sterile ophthalmic fashion.  A Lianet speculum was placed into the left eye.   A paracentesis site was made and 1% preservative-free lidocaine was injected into the anterior chamber.  Viscoelastic  material was injected into the anterior chamber.  A keratome blade was used to make a clear corneal incision.  A cystotome was used to initiate the continuous curvilinear capsulorrhexis which was completed with Utrata forceps.  BSS on a hernandez cannula was used to perform hydrodissection.  The phacoemulsification tip was introduced into the eye and the nucleus was removed in a standard  divide-and-conquer fashion.  Remaining cortical material was removed from the eye using irrigation-aspiration.  The capsular bag was filled with viscoelastic material and the intraocular lens was injected and positioned into place. Remaining viscoelastic material was removed from the eye using irrigation and aspiration.  The corneal wounds were hydrated.  The eye was filled to physiologic pressure. The wounds were found to be watertight. Drops of Vigamox and prednisilone were placed into the eye.  The eye was washed, dried, and shielded.  The patient tolerated the procedure well and knows to follow up with me tomorrow morning, sooner if needed.

## 2022-11-13 NOTE — ANESTHESIA PREPROCEDURE EVALUATION
11/13/2022  Dariel Urbina is a 82 y.o., male.    Pre-op Assessment    I have reviewed the Patient Summary Reports.    I have reviewed the Nursing Notes.    I have reviewed the Medications.     Review of Systems  Anesthesia Hx:  No problems with previous Anesthesia  History of prior surgery of interest to airway management or planning: heart surgery. Previous anesthesia: General Denies Family Hx of Anesthesia complications.   Denies Personal Hx of Anesthesia complications.   Social:  Non-Smoker, Social Alcohol Use    Hematology/Oncology:     Oncology Normal    -- Anemia:   EENT/Dental:EENT/Dental Normal   Cardiovascular:   Hypertension Past MI CAD  CABG/stent (CABG/AVR 2000)  Angina PVD hyperlipidemia  Denies LÓPEZ. ECG has been reviewed.    Pulmonary:  Pulmonary Normal  Denies Shortness of breath.    Renal/:   Chronic Renal Disease, CRI    Hepatic/GI:   GERD    Musculoskeletal:  Spine Disorders: lumbar    Neurological:   Denies TIA. Denies CVA.   Peripheral Neuropathy    Endocrine:   Diabetes, type 2 Denies Hypothyroidism. Denies Hyperthyroidism. hyperparathyroid   Dermatological:  Skin Normal    Psych:  Psychiatric Normal           Physical Exam  General:  Well nourished, Cooperative, Alert and Oriented      Airway/Jaw/Neck:  Airway Findings: Mouth Opening: Normal   Tongue: Normal   General Airway Assessment: Adult Mallampati: II  TM Distance: Normal, at least 6 cm   Jaw/Neck Findings:  Neck ROM: Normal ROM       Dental:  Dental Findings: In tact, Periodontal disease, Mild      Chest/Lungs:  Chest/Lungs Findings: Normal Respiratory Rate      Heart/Vascular:  Heart Findings: Rate: Normal  Rhythm: Regular Rhythm        Mental Status:  Mental Status Findings:  Cooperative, Alert and Oriented         Anesthesia Plan  Type of Anesthesia, risks & benefits discussed:  Anesthesia Type:  MAC    Patient's  Preference:   Plan Factors:          Intra-op Monitoring Plan: standard ASA monitors  Intra-op Monitoring Plan Comments:   Post Op Pain Control Plan: per primary service following discharge from PACU  Post Op Pain Control Plan Comments:     Induction:   IV  Beta Blocker:         Informed Consent: Informed consent signed with the Patient and all parties understand the risks and agree with anesthesia plan.  All questions answered.  Anesthesia consent signed with patient.  ASA Score: 3     Day of Surgery Review of History & Physical:              Ready For Surgery From Anesthesia Perspective.           Physical Exam  General: Well nourished, Cooperative, Alert and Oriented    Airway:  Mallampati: II   Mouth Opening: Normal  TM Distance: Normal, at least 6 cm  Tongue: Normal  Neck ROM: Normal ROM    Dental:  In tact, Periodontal disease, Mild    Chest/Lungs:  Normal Respiratory Rate    Heart:  Rate: Normal  Rhythm: Regular Rhythm          Anesthesia Plan  Type of Anesthesia, risks & benefits discussed:    Anesthesia Type: MAC  Intra-op Monitoring Plan: standard ASA monitors  Post Op Pain Control Plan: per primary service following discharge from PACU  Induction:  IV  Informed Consent: Informed consent signed with the Patient and all parties understand the risks and agree with anesthesia plan.  All questions answered.   ASA Score: 3    Ready For Surgery From Anesthesia Perspective.       .

## 2022-11-13 NOTE — BRIEF OP NOTE
BRIEF DISCHARGE NOTE:    Date of discharge: 11/13/2022    Reason for hospitalization -  Cataract surgery     Final Diagnosis - Visually significant Cataract    Procedures and treatment provided - Status post phacoemulsification with placement of intraocular lens     Diet - Advance to regular as tolerated    Activity - as tolerated    Disposition at the end of the case - Good.    Discharge: to home    The patient tolerated the procedure well and knows to follow up with me tomorrow morning in the eye clinic, sooner if needed.    Patient and family instructions (as appropriate) - Given to patient on discharge    Alia Mckeon MD

## 2022-11-14 ENCOUNTER — OFFICE VISIT (OUTPATIENT)
Dept: OPHTHALMOLOGY | Facility: CLINIC | Age: 82
End: 2022-11-14
Payer: MEDICARE

## 2022-11-14 ENCOUNTER — OFFICE VISIT (OUTPATIENT)
Dept: DERMATOLOGY | Facility: CLINIC | Age: 82
End: 2022-11-14
Payer: MEDICARE

## 2022-11-14 ENCOUNTER — OFFICE VISIT (OUTPATIENT)
Dept: INTERNAL MEDICINE | Facility: CLINIC | Age: 82
End: 2022-11-14
Payer: MEDICARE

## 2022-11-14 VITALS
HEIGHT: 66 IN | DIASTOLIC BLOOD PRESSURE: 62 MMHG | BODY MASS INDEX: 28.34 KG/M2 | WEIGHT: 176.38 LBS | OXYGEN SATURATION: 95 % | SYSTOLIC BLOOD PRESSURE: 116 MMHG | HEART RATE: 45 BPM

## 2022-11-14 DIAGNOSIS — L82.1 SEBORRHEIC KERATOSES: ICD-10-CM

## 2022-11-14 DIAGNOSIS — L98.9 SKIN LESION: Primary | ICD-10-CM

## 2022-11-14 DIAGNOSIS — Z96.1 STATUS POST CATARACT EXTRACTION AND INSERTION OF INTRAOCULAR LENS, LEFT: Primary | ICD-10-CM

## 2022-11-14 DIAGNOSIS — Z98.42 STATUS POST CATARACT EXTRACTION AND INSERTION OF INTRAOCULAR LENS, LEFT: Primary | ICD-10-CM

## 2022-11-14 DIAGNOSIS — D48.5 NEOPLASM OF UNCERTAIN BEHAVIOR OF SKIN: Primary | ICD-10-CM

## 2022-11-14 DIAGNOSIS — N18.32 STAGE 3B CHRONIC KIDNEY DISEASE: Chronic | ICD-10-CM

## 2022-11-14 DIAGNOSIS — L98.9 SKIN LESION: ICD-10-CM

## 2022-11-14 DIAGNOSIS — E11.51 TYPE 2 DIABETES MELLITUS WITH DIABETIC PERIPHERAL ANGIOPATHY WITHOUT GANGRENE, WITHOUT LONG-TERM CURRENT USE OF INSULIN: ICD-10-CM

## 2022-11-14 LAB
ALBUMIN/CREAT UR: 355.4 UG/MG (ref 0–30)
CREAT UR-MCNC: 56 MG/DL (ref 23–375)
MICROALBUMIN UR DL<=1MG/L-MCNC: 199 UG/ML

## 2022-11-14 PROCEDURE — 88342 IMHCHEM/IMCYTCHM 1ST ANTB: CPT | Performed by: PATHOLOGY

## 2022-11-14 PROCEDURE — 99203 PR OFFICE/OUTPT VISIT, NEW, LEVL III, 30-44 MIN: ICD-10-PCS | Mod: 25,S$GLB,, | Performed by: DERMATOLOGY

## 2022-11-14 PROCEDURE — 88341 IMHCHEM/IMCYTCHM EA ADD ANTB: CPT | Mod: 26,,, | Performed by: PATHOLOGY

## 2022-11-14 PROCEDURE — 1101F PT FALLS ASSESS-DOCD LE1/YR: CPT | Mod: CPTII,S$GLB,, | Performed by: OPHTHALMOLOGY

## 2022-11-14 PROCEDURE — 3074F PR MOST RECENT SYSTOLIC BLOOD PRESSURE < 130 MM HG: ICD-10-PCS | Mod: CPTII,S$GLB,, | Performed by: INTERNAL MEDICINE

## 2022-11-14 PROCEDURE — 99999 PR PBB SHADOW E&M-EST. PATIENT-LVL III: ICD-10-PCS | Mod: PBBFAC,,, | Performed by: DERMATOLOGY

## 2022-11-14 PROCEDURE — 11102 TANGNTL BX SKIN SINGLE LES: CPT | Mod: S$GLB,,, | Performed by: DERMATOLOGY

## 2022-11-14 PROCEDURE — 1101F PR PT FALLS ASSESS DOC 0-1 FALLS W/OUT INJ PAST YR: ICD-10-PCS | Mod: CPTII,S$GLB,, | Performed by: INTERNAL MEDICINE

## 2022-11-14 PROCEDURE — 99024 PR POST-OP FOLLOW-UP VISIT: ICD-10-PCS | Mod: S$GLB,,, | Performed by: OPHTHALMOLOGY

## 2022-11-14 PROCEDURE — 88341 IMHCHEM/IMCYTCHM EA ADD ANTB: CPT | Mod: 59 | Performed by: PATHOLOGY

## 2022-11-14 PROCEDURE — 99999 PR PBB SHADOW E&M-EST. PATIENT-LVL III: ICD-10-PCS | Mod: PBBFAC,,, | Performed by: OPHTHALMOLOGY

## 2022-11-14 PROCEDURE — 11103 PR TANGENTIAL BIOPSY, SKIN, EA ADDTL LESION: ICD-10-PCS | Mod: 59,S$GLB,, | Performed by: DERMATOLOGY

## 2022-11-14 PROCEDURE — 3074F SYST BP LT 130 MM HG: CPT | Mod: CPTII,S$GLB,, | Performed by: INTERNAL MEDICINE

## 2022-11-14 PROCEDURE — 88341 PR IHC OR ICC EACH ADD'L SINGLE ANTIBODY  STAINPR: ICD-10-PCS | Mod: 26,,, | Performed by: PATHOLOGY

## 2022-11-14 PROCEDURE — 1159F PR MEDICATION LIST DOCUMENTED IN MEDICAL RECORD: ICD-10-PCS | Mod: CPTII,S$GLB,, | Performed by: INTERNAL MEDICINE

## 2022-11-14 PROCEDURE — 1101F PR PT FALLS ASSESS DOC 0-1 FALLS W/OUT INJ PAST YR: ICD-10-PCS | Mod: CPTII,S$GLB,, | Performed by: OPHTHALMOLOGY

## 2022-11-14 PROCEDURE — 88305 TISSUE EXAM BY PATHOLOGIST: CPT | Mod: 59 | Performed by: PATHOLOGY

## 2022-11-14 PROCEDURE — 88342 IMHCHEM/IMCYTCHM 1ST ANTB: CPT | Mod: 26,,, | Performed by: PATHOLOGY

## 2022-11-14 PROCEDURE — 11103 TANGNTL BX SKIN EA SEP/ADDL: CPT | Mod: 59,S$GLB,, | Performed by: DERMATOLOGY

## 2022-11-14 PROCEDURE — 1126F PR PAIN SEVERITY QUANTIFIED, NO PAIN PRESENT: ICD-10-PCS | Mod: CPTII,S$GLB,, | Performed by: INTERNAL MEDICINE

## 2022-11-14 PROCEDURE — 3072F LOW RISK FOR RETINOPATHY: CPT | Mod: CPTII,S$GLB,, | Performed by: DERMATOLOGY

## 2022-11-14 PROCEDURE — 1159F PR MEDICATION LIST DOCUMENTED IN MEDICAL RECORD: ICD-10-PCS | Mod: CPTII,S$GLB,, | Performed by: OPHTHALMOLOGY

## 2022-11-14 PROCEDURE — 1111F PR DISCHARGE MEDS RECONCILED W/ CURRENT OUTPATIENT MED LIST: ICD-10-PCS | Mod: CPTII,S$GLB,, | Performed by: INTERNAL MEDICINE

## 2022-11-14 PROCEDURE — 99203 OFFICE O/P NEW LOW 30 MIN: CPT | Mod: 25,S$GLB,, | Performed by: DERMATOLOGY

## 2022-11-14 PROCEDURE — 3288F PR FALLS RISK ASSESSMENT DOCUMENTED: ICD-10-PCS | Mod: CPTII,S$GLB,, | Performed by: OPHTHALMOLOGY

## 2022-11-14 PROCEDURE — 1111F DSCHRG MED/CURRENT MED MERGE: CPT | Mod: CPTII,S$GLB,, | Performed by: DERMATOLOGY

## 2022-11-14 PROCEDURE — 1126F AMNT PAIN NOTED NONE PRSNT: CPT | Mod: CPTII,S$GLB,, | Performed by: DERMATOLOGY

## 2022-11-14 PROCEDURE — 1101F PT FALLS ASSESS-DOCD LE1/YR: CPT | Mod: CPTII,S$GLB,, | Performed by: INTERNAL MEDICINE

## 2022-11-14 PROCEDURE — 1111F PR DISCHARGE MEDS RECONCILED W/ CURRENT OUTPATIENT MED LIST: ICD-10-PCS | Mod: CPTII,S$GLB,, | Performed by: DERMATOLOGY

## 2022-11-14 PROCEDURE — 3078F PR MOST RECENT DIASTOLIC BLOOD PRESSURE < 80 MM HG: ICD-10-PCS | Mod: CPTII,S$GLB,, | Performed by: INTERNAL MEDICINE

## 2022-11-14 PROCEDURE — 1159F MED LIST DOCD IN RCRD: CPT | Mod: CPTII,S$GLB,, | Performed by: DERMATOLOGY

## 2022-11-14 PROCEDURE — 88305 TISSUE EXAM BY PATHOLOGIST: CPT | Mod: 26,,, | Performed by: PATHOLOGY

## 2022-11-14 PROCEDURE — 99999 PR PBB SHADOW E&M-EST. PATIENT-LVL III: CPT | Mod: PBBFAC,,, | Performed by: OPHTHALMOLOGY

## 2022-11-14 PROCEDURE — 11102 PR TANGENTIAL BIOPSY, SKIN, SINGLE LESION: ICD-10-PCS | Mod: S$GLB,,, | Performed by: DERMATOLOGY

## 2022-11-14 PROCEDURE — 99999 PR PBB SHADOW E&M-EST. PATIENT-LVL III: CPT | Mod: PBBFAC,,, | Performed by: DERMATOLOGY

## 2022-11-14 PROCEDURE — 99499 RISK ADDL DX/OHS AUDIT: ICD-10-PCS | Mod: HCNC,S$GLB,, | Performed by: INTERNAL MEDICINE

## 2022-11-14 PROCEDURE — 99214 OFFICE O/P EST MOD 30 MIN: CPT | Mod: S$GLB,,, | Performed by: INTERNAL MEDICINE

## 2022-11-14 PROCEDURE — 99999 PR PBB SHADOW E&M-EST. PATIENT-LVL III: ICD-10-PCS | Mod: PBBFAC,,, | Performed by: INTERNAL MEDICINE

## 2022-11-14 PROCEDURE — 3288F FALL RISK ASSESSMENT DOCD: CPT | Mod: CPTII,S$GLB,, | Performed by: INTERNAL MEDICINE

## 2022-11-14 PROCEDURE — 82043 UR ALBUMIN QUANTITATIVE: CPT | Performed by: INTERNAL MEDICINE

## 2022-11-14 PROCEDURE — 99024 POSTOP FOLLOW-UP VISIT: CPT | Mod: S$GLB,,, | Performed by: OPHTHALMOLOGY

## 2022-11-14 PROCEDURE — 1159F MED LIST DOCD IN RCRD: CPT | Mod: CPTII,S$GLB,, | Performed by: OPHTHALMOLOGY

## 2022-11-14 PROCEDURE — 3078F DIAST BP <80 MM HG: CPT | Mod: CPTII,S$GLB,, | Performed by: INTERNAL MEDICINE

## 2022-11-14 PROCEDURE — 1126F PR PAIN SEVERITY QUANTIFIED, NO PAIN PRESENT: ICD-10-PCS | Mod: CPTII,S$GLB,, | Performed by: DERMATOLOGY

## 2022-11-14 PROCEDURE — 82570 ASSAY OF URINE CREATININE: CPT | Performed by: INTERNAL MEDICINE

## 2022-11-14 PROCEDURE — 1159F MED LIST DOCD IN RCRD: CPT | Mod: CPTII,S$GLB,, | Performed by: INTERNAL MEDICINE

## 2022-11-14 PROCEDURE — 1126F PR PAIN SEVERITY QUANTIFIED, NO PAIN PRESENT: ICD-10-PCS | Mod: CPTII,S$GLB,, | Performed by: OPHTHALMOLOGY

## 2022-11-14 PROCEDURE — 99499 UNLISTED E&M SERVICE: CPT | Mod: HCNC,S$GLB,, | Performed by: INTERNAL MEDICINE

## 2022-11-14 PROCEDURE — 88342 CHG IMMUNOCYTOCHEMISTRY: ICD-10-PCS | Mod: 26,,, | Performed by: PATHOLOGY

## 2022-11-14 PROCEDURE — 3288F PR FALLS RISK ASSESSMENT DOCUMENTED: ICD-10-PCS | Mod: CPTII,S$GLB,, | Performed by: INTERNAL MEDICINE

## 2022-11-14 PROCEDURE — 99999 PR PBB SHADOW E&M-EST. PATIENT-LVL III: CPT | Mod: PBBFAC,,, | Performed by: INTERNAL MEDICINE

## 2022-11-14 PROCEDURE — 1126F AMNT PAIN NOTED NONE PRSNT: CPT | Mod: CPTII,S$GLB,, | Performed by: INTERNAL MEDICINE

## 2022-11-14 PROCEDURE — 3288F FALL RISK ASSESSMENT DOCD: CPT | Mod: CPTII,S$GLB,, | Performed by: OPHTHALMOLOGY

## 2022-11-14 PROCEDURE — 3072F LOW RISK FOR RETINOPATHY: CPT | Mod: CPTII,S$GLB,, | Performed by: INTERNAL MEDICINE

## 2022-11-14 PROCEDURE — 1126F AMNT PAIN NOTED NONE PRSNT: CPT | Mod: CPTII,S$GLB,, | Performed by: OPHTHALMOLOGY

## 2022-11-14 PROCEDURE — 99214 PR OFFICE/OUTPT VISIT, EST, LEVL IV, 30-39 MIN: ICD-10-PCS | Mod: S$GLB,,, | Performed by: INTERNAL MEDICINE

## 2022-11-14 PROCEDURE — 3072F PR LOW RISK FOR RETINOPATHY: ICD-10-PCS | Mod: CPTII,S$GLB,, | Performed by: DERMATOLOGY

## 2022-11-14 PROCEDURE — 3072F PR LOW RISK FOR RETINOPATHY: ICD-10-PCS | Mod: CPTII,S$GLB,, | Performed by: INTERNAL MEDICINE

## 2022-11-14 PROCEDURE — 1111F DSCHRG MED/CURRENT MED MERGE: CPT | Mod: CPTII,S$GLB,, | Performed by: INTERNAL MEDICINE

## 2022-11-14 PROCEDURE — 1159F PR MEDICATION LIST DOCUMENTED IN MEDICAL RECORD: ICD-10-PCS | Mod: CPTII,S$GLB,, | Performed by: DERMATOLOGY

## 2022-11-14 PROCEDURE — 88305 TISSUE EXAM BY PATHOLOGIST: ICD-10-PCS | Mod: 26,,, | Performed by: PATHOLOGY

## 2022-11-14 NOTE — PROGRESS NOTES
Subjective:       Patient ID:  Dariel Urbina is a 82 y.o. male who presents for   Chief Complaint   Patient presents with    Lesion     Scalp, left ear, left cheek     Lesion - Initial  Affected locations: scalp, left ear and left cheek  Signs / symptoms: scaling and bleeding  Aggravated by: scratching    No known hx of skin cancer.  Has spot on scalp x years not healing, feels crusted.  Also has spot on left cheek that continues to bleed.  Otherwise The patient denies any moles or growths of the skin that are rapidly growing, hurting, itching, bleeding, or changing colors.  Here with wife today.  On Coumadin - last INR 2.2. hx of mechanical aortic valve replacement.    Review of Systems     Objective:    Physical Exam   Constitutional: He appears well-developed and well-nourished. No distress.   Neurological: He is alert and oriented to person, place, and time. He is not disoriented.   Psychiatric: He has a normal mood and affect.   Skin:   Areas Examined (abnormalities noted in diagram):   Scalp / Hair Palpated and Inspected  Head / Face Inspection Performed  Neck Inspection Performed  Chest / Axilla Inspection Performed  Abdomen Inspection Performed  Back Inspection Performed  RUE Inspected  LUE Inspection Performed                 Diagram Legend     Erythematous scaling macule/papule c/w actinic keratosis       Vascular papule c/w angioma      Pigmented verrucoid papule/plaque c/w seborrheic keratosis      Yellow umbilicated papule c/w sebaceous hyperplasia      Irregularly shaped tan macule c/w lentigo     1-2 mm smooth white papules consistent with Milia      Movable subcutaneous cyst with punctum c/w epidermal inclusion cyst      Subcutaneous movable cyst c/w pilar cyst      Firm pink to brown papule c/w dermatofibroma      Pedunculated fleshy papule(s) c/w skin tag(s)      Evenly pigmented macule c/w junctional nevus     Mildly variegated pigmented, slightly irregular-bordered macule c/w mildly  atypical nevus      Flesh colored to evenly pigmented papule c/w intradermal nevus       Pink pearly papule/plaque c/w basal cell carcinoma      Erythematous hyperkeratotic cursted plaque c/w SCC      Surgical scar with no sign of skin cancer recurrence      Open and closed comedones      Inflammatory papules and pustules      Verrucoid papule consistent consistent with wart     Erythematous eczematous patches and plaques     Dystrophic onycholytic nail with subungual debris c/w onychomycosis     Umbilicated papule    Erythematous-base heme-crusted tan verrucoid plaque consistent with inflamed seborrheic keratosis     Erythematous Silvery Scaling Plaque c/w Psoriasis     See annotation        Left mid cheek      Vertex scalp    Assessment / Plan:      Pathology Orders:       Normal Orders This Visit    Specimen to Pathology, Dermatology     Comments:    Number of Specimens:->2  ------------------------->-------------------------  Spec 1 Procedure:->Biopsy  Spec 1 Clinical Impression:->2 cm hyperkeratotic plaque r/o  SCC  Spec 1 Source:->vertex scalp  ------------------------->-------------------------  Spec 2 Procedure:->Biopsy  Spec 2 Clinical Impression:->bleeding papule 2 mm r/o ISK vs  NMSC  Spec 2 Source:->left mid cheek  Release to patient->Immediate    Questions:    Procedure Type: Dermatology and skin neoplasms    Number of Specimens: 2    ------------------------: -------------------------    Spec 1 Procedure: Biopsy    Spec 1 Clinical Impression: 2 cm hyperkeratotic plaque r/o SCC    Spec 1 Source: vertex scalp    ------------------------: -------------------------    Spec 2 Procedure: Biopsy    Spec 2 Clinical Impression: bleeding papule 2 mm r/o ISK vs NMSC    Spec 2 Source: left mid cheek    Clinical Information: nonhealing lesions    Release to patient: Immediate          Neoplasm of uncertain behavior of skin  -     Specimen to Pathology, Dermatology  Vertex scalp and left mid cheek - both r/o  SCC  Shave biopsy procedure note:    Shave biopsy performed after verbal consent including risk of infection, scar, recurrence, need for additional treatment of site. Area prepped with alcohol, anesthetized with approximately 1.0cc of 0.5% bupivicaine with epinephrine. Lesional tissue shaved with razor blade. Hemostasis achieved with application of aluminum chloride followed by hyfrecation. No complications. Dressing applied. Wound care explained.    If biopsy positive for malignancy, refer to Dr. Bueno for Mohs surgery consultation.    Seborrheic keratoses  These are benign inherited growths without a malignant potential. Reassurance given to patient. No treatment is necessary.          Follow up if symptoms worsen or fail to improve.

## 2022-11-14 NOTE — PROGRESS NOTES
HPI    Dr. Narvaez     Myopia with Astigmatism and PresbyopiaOU   OAG Suspect OS   Optic Nerve Cupping OU   ERM OS   Type2 DM no DR   NS OD  S/P Phaco w/IOL OS 11/13/2022    Eye Med's: PGB TID OS    82 y.o. male is here for 1 day PO OS. Denies eye pain and f/f. No   discomfort or blurred vision at distance.     Last edited by RUTH Jose on 11/14/2022 11:32 AM.            Assessment /Plan     For exam results, see Encounter Report.    Status post cataract extraction and insertion of intraocular lens, left    Slit Lamp Exam  L/L - normal  C/s - quiet  Cornea - clear  A/C - 1+ cell  Lens - PCIOL    POD #1 s/p phaco/IOL  - doing well  - continue the following drops:    vigamox or ocuflox TID x 1 wk then stop  Pred forte or durezol or dexamethasone TID x  4 wks  Ketorolac TID until runs out    Versus:    Combination drop - 1 drop TID x total of 1 month    Appropriate precautions and post op medications reviewed.  Patient instructed to call or come in if symptoms of redness, decreased vision, or pain are experienced.    -f/up 1-2wks, sooner PRN.     Cat OD - can sign consent next if ready to proceed.

## 2022-11-14 NOTE — PROGRESS NOTES
81 yo M here for follow up his medical problems.   I saw him in Oct 10th, 2022.  He is here because we stopped his glimepiride last visit.     He fell( passed out ) in bathroom after getting up to go to the bathroom.  He had a cut on his left forearm.  It was after urinating.  Went to the hospital and had a work up.  Looks like he is back on it.  He had an ED visit due to either having it place back on his list after recent ED visit or post op?   He was in ED due to epistaxis 10/26/2022.    His wife tells me he started feeling bad and has to suck on some hard candy.  He has been losing weight.  Wt today is 176 lbs.  Last month was 177lbs-- his appetite is better.            He is back on  coreg 12.5, bumex 1 mg q d, imdue 120 mg a day  and low dose lisinopril 2.5 mg a day    He was in the hospital 10/25/2022-- notes were reviewed.  Hospitalized 9/29- 10/1 for his syncope.          CT of abdomen  aorta just below the level of the diaphragm (3.9 cm), infrarenal with mural thrombus (3.9 cm) and just superior to the aortic bifurcation (3.2 cm) (series 2 image 28, 65, 91).  Severe iliac calcific atherosclerosis..  Extensive calcification along the wall of the aorta and branches.  There is a segment of the mid abdominal aorta with calcification centrally, may represent short segment dissection, configuration similar to prior imaging.      Cardiology consulted due to concern for heart block           - EKG shows LBBB w/ 1st degree AV block. No signs of 2nd or 3rd degree block          - Recommended fluid repletion, monitor BP and workup diarrheal illness  - Ordered Echo - EF 50%, Grade 1 LV DD   - Orthostatic vitals positive   - IV  ml bolus -- given IV fluid.        -          He has  diabetes, hypertension, CAD, HLD, gout and mechanical aortic valve who Had epistaxis 2-3x earlier this year .   He is on both Coumadin and Plavix any has history of anemia.  His last cardiovascular stent was in November of 2021.  [place] been seen by  ENT yesterday and they cauterized his nose .    He has had a couple nose bleeds but they are better.        He had COVID4   Months  ago.  He came to the hospital he was given Remdesiver.  He says that he has pretty much recovered from the COVID.  He denies any shortness of breath or cough.          Diabetes mellitus type 2. Last A1c was  5.4   -- . He was put on amaryl 1mg last visit.   Weight today is 176 # lbs-   . . Patient denies polyuria, polydipsia, visual disturbances.         Hyperlipidemia. On lipitor 40 mg , lovaza. And Fenofibrate --Recent lipid panel was reviewed.  . He says he is taking all his cholesterol meds. Chol 103, hdl 34, Ldl 43.4.   2/10/22--        HTN.   Pt does not measure BPs at home. BP today is 116/62  His  ACE was stopped due to JIM and hyperkalemia. K=4.9  He see nephrology in August- recent creatine was 1.8 -          CKD stage III. Pt saw Nephrology in March 2022 -- than note was reviewed.  He has had some acute on chronic kidney failure.  Most recent creatinine is 2.1.    I year ago his creatinine was 2.3.  He has had several creatinines during hospitalization in the low threes.  His potasium is  4.5.  He has been instructed on a a low potasium diet by nephrology, but is not following one.           H/o partial colon resection due to diverticulosis. See above.  NO GI  bleeding since last visit.  NO recent diarrhea.          .    H/o CAD s/p CABG 2002, multiple stents, aortic valve replacement on coumadin. He saws he been having  chest pain for about 4 years ( not really per him)-- he has limited activity .   He had another stent placed back in November 2021 a zone Plavix.  He also went to cardiac rehab.--  . He has LÓPEZ but this is chronic and some left sided claudication.   He has not had any chest pains recently.  He says the pain he is having is a burning in the throat. It has not changed in 3 years.        AAA- last us Showed 3.32 cm AAA- (9/29/22 )largest  "supra renal area. since last visit -            H/o gout. On allopurinol. Last flare was 18 mo ago in left foot. He remains on Allopurinol.      Valvular heart disease- with mechanical AV, some MVR and some TVR-- NO NO recent chest pains .  Reviewed recent PET stress test.   SOB with walking 75 feet- unchanged since last visit, and the one previous to that.          He had cataract surgery yesterday morning ( Sunday)         Review of Systems    Constitutional: Negative for fever, chills and unexpected weight change.    HENT: Negative for congestion, rhinorrhea and sinus pressure.    Eyes: Negative for visual disturbance.    Respiratory: He Has some SOB, but this is unchanged. He can mow the lawn with walk behind mower.    This has not changed since last visit.    Cardiovascular:as above.  .    Gastrointestinal: Negative for nausea, vomiting, abdominal pain, and constipation.  He does have some diarrhea-  - as above.    Genitourinary: Negative for dysuria, urgency and difficulty urinating.    Musculoskeletal: He reports his back- that was bothering him last vsiit- is not bothering him recently.    Skin: Negative.    Neurological: Negative for dizziness, syncope and headaches.    Hematological: Negative.    Psychiatric/Behavioral: Negative.    All other systems reviewed and are negative.    Objective:                /62 (BP Location: Left arm, Patient Position: Sitting, BP Method: Medium (Manual))   Pulse (!) 45   Ht 5' 6" (1.676 m)   Wt 80 kg (176 lb 5.9 oz)   SpO2 95%   BMI 28.47 kg/m²           Constitutional: He is oriented to person, place, and time. He appears well-developed and well-nourished. No distress.    HENT:    Head: Normocephalic and atraumatic. He has a benign lesion on his left check.    Mouth/Throat: Oropharynx is clear and moist. No oropharyngeal exudate.    Eyes: Conjunctivae and EOM are normal.    Neck: Normal range of motion. Neck supple. No JVD present. No tracheal deviation present. "    Cardiovascular: Regular rhythm and normal heart sounds. Exam reveals no gallop and no friction rub.    Systolic murmur heard.     Pulmonary/Chest: Effort normal and breath sounds normal. No respiratory distress. He has no wheezes. He has no rales.    Abdominal: Soft. Bowel sounds are normal. He exhibits no distension. There is no tenderness. There is no rebound.   Musculoskeletal: Normal range of motion. He exhibits no edema and no tenderness.   Neurological: He is alert and oriented to person, place, and time.    Skin: Skin is warm and dry. No rash noted. He is not diaphoretic. No erythema.   Psychiatric: He has a normal mood and affect. His behavior is normal.      He hs a hyperkeratotic spot on the top of his forehead.                  Assessment:      1.   DM (diabetes mellitus)     2.   Hyperlipidemia     3.   Chronic kidney disease, stage III (moderate)     4.   History of colon resection     5.   Hypertension     6.   CAD (coronary atherosclerotic disease)     7.    8.  9.  10.  History of aortic valve repair    AAA  [facility]- on coumadin   Colon polyps--     Plan:           Will are going to have him see derm for the hyperkeratotic lesion.        will akash webb in 1 month   Will stop glimepride      Measure bp at home.  Let me know if he has any low blood pressure.

## 2022-11-14 NOTE — ADDENDUM NOTE
Addended by: ELEANOR SEWELL on: 11/14/2022 10:15 AM     Modules accepted: Orders, Level of Service

## 2022-11-18 DIAGNOSIS — H25.11 NUCLEAR SCLEROTIC CATARACT OF RIGHT EYE: Primary | ICD-10-CM

## 2022-11-21 ENCOUNTER — ANTI-COAG VISIT (OUTPATIENT)
Dept: CARDIOLOGY | Facility: CLINIC | Age: 82
End: 2022-11-21
Payer: MEDICARE

## 2022-11-21 ENCOUNTER — OFFICE VISIT (OUTPATIENT)
Dept: OPHTHALMOLOGY | Facility: CLINIC | Age: 82
End: 2022-11-21
Payer: MEDICARE

## 2022-11-21 ENCOUNTER — LAB VISIT (OUTPATIENT)
Dept: LAB | Facility: HOSPITAL | Age: 82
End: 2022-11-21
Attending: INTERNAL MEDICINE
Payer: MEDICARE

## 2022-11-21 DIAGNOSIS — Z79.01 LONG TERM CURRENT USE OF ANTICOAGULANT THERAPY: ICD-10-CM

## 2022-11-21 DIAGNOSIS — H25.11 NUCLEAR SCLEROTIC CATARACT OF RIGHT EYE: ICD-10-CM

## 2022-11-21 DIAGNOSIS — Z95.2 H/O MECHANICAL AORTIC VALVE REPLACEMENT: ICD-10-CM

## 2022-11-21 DIAGNOSIS — Z98.42 STATUS POST CATARACT EXTRACTION AND INSERTION OF INTRAOCULAR LENS, LEFT: Primary | ICD-10-CM

## 2022-11-21 DIAGNOSIS — Z79.01 LONG TERM CURRENT USE OF ANTICOAGULANT THERAPY: Primary | ICD-10-CM

## 2022-11-21 DIAGNOSIS — Z96.1 STATUS POST CATARACT EXTRACTION AND INSERTION OF INTRAOCULAR LENS, LEFT: Primary | ICD-10-CM

## 2022-11-21 LAB
INR PPP: 2.3 (ref 0.8–1.2)
PROTHROMBIN TIME: 23.2 SEC (ref 9–12.5)

## 2022-11-21 PROCEDURE — 99999 PR PBB SHADOW E&M-EST. PATIENT-LVL III: CPT | Mod: PBBFAC,,, | Performed by: OPHTHALMOLOGY

## 2022-11-21 PROCEDURE — 1101F PR PT FALLS ASSESS DOC 0-1 FALLS W/OUT INJ PAST YR: ICD-10-PCS | Mod: CPTII,S$GLB,, | Performed by: OPHTHALMOLOGY

## 2022-11-21 PROCEDURE — 99024 PR POST-OP FOLLOW-UP VISIT: ICD-10-PCS | Mod: S$GLB,,, | Performed by: OPHTHALMOLOGY

## 2022-11-21 PROCEDURE — 92136 OPHTHALMIC BIOMETRY: CPT | Mod: RT,S$GLB,, | Performed by: OPHTHALMOLOGY

## 2022-11-21 PROCEDURE — 3288F FALL RISK ASSESSMENT DOCD: CPT | Mod: CPTII,S$GLB,, | Performed by: OPHTHALMOLOGY

## 2022-11-21 PROCEDURE — 1126F PR PAIN SEVERITY QUANTIFIED, NO PAIN PRESENT: ICD-10-PCS | Mod: CPTII,S$GLB,, | Performed by: OPHTHALMOLOGY

## 2022-11-21 PROCEDURE — 93793 PR ANTICOAGULANT MGMT FOR PT TAKING WARFARIN: ICD-10-PCS | Mod: S$GLB,,,

## 2022-11-21 PROCEDURE — 99024 POSTOP FOLLOW-UP VISIT: CPT | Mod: S$GLB,,, | Performed by: OPHTHALMOLOGY

## 2022-11-21 PROCEDURE — 1101F PT FALLS ASSESS-DOCD LE1/YR: CPT | Mod: CPTII,S$GLB,, | Performed by: OPHTHALMOLOGY

## 2022-11-21 PROCEDURE — 93793 ANTICOAG MGMT PT WARFARIN: CPT | Mod: S$GLB,,,

## 2022-11-21 PROCEDURE — 99999 PR PBB SHADOW E&M-EST. PATIENT-LVL III: ICD-10-PCS | Mod: PBBFAC,,, | Performed by: OPHTHALMOLOGY

## 2022-11-21 PROCEDURE — 85610 PROTHROMBIN TIME: CPT | Performed by: INTERNAL MEDICINE

## 2022-11-21 PROCEDURE — 92136 IOL MASTER - OD - RIGHT EYE: ICD-10-PCS | Mod: RT,S$GLB,, | Performed by: OPHTHALMOLOGY

## 2022-11-21 PROCEDURE — 36415 COLL VENOUS BLD VENIPUNCTURE: CPT | Performed by: INTERNAL MEDICINE

## 2022-11-21 PROCEDURE — 3288F PR FALLS RISK ASSESSMENT DOCUMENTED: ICD-10-PCS | Mod: CPTII,S$GLB,, | Performed by: OPHTHALMOLOGY

## 2022-11-21 PROCEDURE — 1126F AMNT PAIN NOTED NONE PRSNT: CPT | Mod: CPTII,S$GLB,, | Performed by: OPHTHALMOLOGY

## 2022-11-21 RX ORDER — PREDNISOLONE ACETATE-GATIFLOXACIN-BROMFENAC .75; 5; 1 MG/ML; MG/ML; MG/ML
1 SUSPENSION/ DROPS OPHTHALMIC 3 TIMES DAILY
Qty: 5 ML | Refills: 3 | Status: ON HOLD | OUTPATIENT
Start: 2022-11-21 | End: 2022-12-31 | Stop reason: HOSPADM

## 2022-11-21 NOTE — PROGRESS NOTES
HPI     Post-op Evaluation     Additional comments: Pt states eye is doing okay and has no complaints   today           Comments    Referred by     S/p Phaco IOL OS 11/13/22  NS OD  Glaucoma Suspect OS  ON Cupping OU  ERM OS  Type 2 DM no DR    PGB TID OS    Pt states no vision complaints today and that OS is doing okay          Last edited by Akua Mckeon MA on 11/21/2022  1:18 PM.            Assessment /Plan     For exam results, see Encounter Report.    Status post cataract extraction and insertion of intraocular lens, left    Nuclear sclerotic cataract of right eye  -     IOL Master - OD - Right Eye    PO week #1 s/p phaco/IOL -    - doing well, no issues    Continue combo drops for a total of 1 month versus: d/c abx gtt, continue PF/ketorolocTID for total of 1 month                          Visually significant nuclear sclerotic cataract   - Interfering with activities of daily living.  Pt desires cataract surgery for [facility] rehabilitation.   - R/B/A discussed and pt agrees to proceed with surgery.   - IOL options discussed according to patient's goals and concomitant ocular pathology; and pt content with monofocal lens.    - Target: plano.         (Diboo 21.5 OD)    ERM OS    Astig OD>OS    Okay with rx/ readers post sx, defers upgraded lens technology

## 2022-11-23 NOTE — PROGRESS NOTES
LPN notes reviewed - patient may continue using Afrin to help control nosebleed. Unless patient is experiencing excessive bleeding lasting >20 min and/or having any symptoms of anemia (SOB, dizziness, fatigue, lightheadedness), does not need to seek emergent care.      Nosebleeds are a nuisance but rarely an emergency. There are some situations  when nosebleeds require immediate medical attention:   Bleeding that does not stop in 30 minutes   Bleeding that is very heavy, pouring down the back of your throat and out  the front of your nose   Bleeding with other symptoms like:  o very high blood pressure  o light headedness  o chest pain  o rapid heart rate that may require treatment

## 2022-11-23 NOTE — PROGRESS NOTES
Pt's wife called to report pt has had a nose bleed started at 3am but has stopped. Pt using Afrin. Pt states he does not want to go to ER again.

## 2022-11-30 ENCOUNTER — TELEPHONE (OUTPATIENT)
Dept: OPHTHALMOLOGY | Facility: CLINIC | Age: 82
End: 2022-11-30
Payer: MEDICARE

## 2022-11-30 ENCOUNTER — TELEPHONE (OUTPATIENT)
Dept: OTOLARYNGOLOGY | Facility: CLINIC | Age: 82
End: 2022-11-30
Payer: MEDICARE

## 2022-11-30 ENCOUNTER — OFFICE VISIT (OUTPATIENT)
Dept: OTOLARYNGOLOGY | Facility: CLINIC | Age: 82
End: 2022-11-30
Payer: MEDICARE

## 2022-11-30 VITALS — HEIGHT: 66 IN | BODY MASS INDEX: 27.64 KG/M2 | WEIGHT: 172 LBS

## 2022-11-30 DIAGNOSIS — R04.0 RECURRENT EPISTAXIS: Primary | ICD-10-CM

## 2022-11-30 DIAGNOSIS — Z79.01 ANTICOAGULATED BY ANTICOAGULATION TREATMENT: ICD-10-CM

## 2022-11-30 LAB
FINAL PATHOLOGIC DIAGNOSIS: NORMAL
GROSS: NORMAL
Lab: NORMAL
MICROSCOPIC EXAM: NORMAL

## 2022-11-30 PROCEDURE — 99212 OFFICE O/P EST SF 10 MIN: CPT | Mod: 25,S$GLB,, | Performed by: OTOLARYNGOLOGY

## 2022-11-30 PROCEDURE — 1159F MED LIST DOCD IN RCRD: CPT | Mod: CPTII,S$GLB,, | Performed by: OTOLARYNGOLOGY

## 2022-11-30 PROCEDURE — 3288F PR FALLS RISK ASSESSMENT DOCUMENTED: ICD-10-PCS | Mod: CPTII,S$GLB,, | Performed by: OTOLARYNGOLOGY

## 2022-11-30 PROCEDURE — 30903 PR CTRL NOSEBLEED,ANTER,COMPLEX: ICD-10-PCS | Mod: LT,S$GLB,, | Performed by: OTOLARYNGOLOGY

## 2022-11-30 PROCEDURE — 99999 PR PBB SHADOW E&M-EST. PATIENT-LVL III: ICD-10-PCS | Mod: PBBFAC,,, | Performed by: OTOLARYNGOLOGY

## 2022-11-30 PROCEDURE — 1160F RVW MEDS BY RX/DR IN RCRD: CPT | Mod: CPTII,S$GLB,, | Performed by: OTOLARYNGOLOGY

## 2022-11-30 PROCEDURE — 3288F FALL RISK ASSESSMENT DOCD: CPT | Mod: CPTII,S$GLB,, | Performed by: OTOLARYNGOLOGY

## 2022-11-30 PROCEDURE — 1126F PR PAIN SEVERITY QUANTIFIED, NO PAIN PRESENT: ICD-10-PCS | Mod: CPTII,S$GLB,, | Performed by: OTOLARYNGOLOGY

## 2022-11-30 PROCEDURE — 3072F LOW RISK FOR RETINOPATHY: CPT | Mod: CPTII,S$GLB,, | Performed by: OTOLARYNGOLOGY

## 2022-11-30 PROCEDURE — 1101F PT FALLS ASSESS-DOCD LE1/YR: CPT | Mod: CPTII,S$GLB,, | Performed by: OTOLARYNGOLOGY

## 2022-11-30 PROCEDURE — 99999 PR PBB SHADOW E&M-EST. PATIENT-LVL III: CPT | Mod: PBBFAC,,, | Performed by: OTOLARYNGOLOGY

## 2022-11-30 PROCEDURE — 1101F PR PT FALLS ASSESS DOC 0-1 FALLS W/OUT INJ PAST YR: ICD-10-PCS | Mod: CPTII,S$GLB,, | Performed by: OTOLARYNGOLOGY

## 2022-11-30 PROCEDURE — 1159F PR MEDICATION LIST DOCUMENTED IN MEDICAL RECORD: ICD-10-PCS | Mod: CPTII,S$GLB,, | Performed by: OTOLARYNGOLOGY

## 2022-11-30 PROCEDURE — 1160F PR REVIEW ALL MEDS BY PRESCRIBER/CLIN PHARMACIST DOCUMENTED: ICD-10-PCS | Mod: CPTII,S$GLB,, | Performed by: OTOLARYNGOLOGY

## 2022-11-30 PROCEDURE — 30903 CONTROL OF NOSEBLEED: CPT | Mod: LT,S$GLB,, | Performed by: OTOLARYNGOLOGY

## 2022-11-30 PROCEDURE — 1126F AMNT PAIN NOTED NONE PRSNT: CPT | Mod: CPTII,S$GLB,, | Performed by: OTOLARYNGOLOGY

## 2022-11-30 PROCEDURE — 3072F PR LOW RISK FOR RETINOPATHY: ICD-10-PCS | Mod: CPTII,S$GLB,, | Performed by: OTOLARYNGOLOGY

## 2022-11-30 PROCEDURE — 99212 PR OFFICE/OUTPT VISIT, EST, LEVL II, 10-19 MIN: ICD-10-PCS | Mod: 25,S$GLB,, | Performed by: OTOLARYNGOLOGY

## 2022-11-30 NOTE — TELEPHONE ENCOUNTER
----- Message from Darius Tremayne Brown sent at 11/30/2022  8:07 AM CST -----  Regarding: pt advice / appt  Contact: Ameena (wife) @ [phone]  Needs Medical Advice    Who Called: Ameena, Wife of Pt     Symptoms (please be specific): Nose Bleed on both sides      How long has patient had these symptoms:  Yesterday ( Off and On)    Pharmacy name and phone #:      [facility] [address] [address]  Phone: [phone] Fax: [phone]     Would the patient rather a call back or a response via [facility]? Call Back     Best Call Back Number: [phone]    Additional Information: Pt is seeking same day appointment with any provider.

## 2022-12-01 NOTE — PROGRESS NOTES
"  Subjective:      Dariel is a 82 y.o. male who comes for follow-up of nosebleeds.  His last visit with me was on 10/19/2022.  Started bleeding about last night, first on left, then on right.  Stopped with pressure.  No blockage, pain, infection.        %       The patient's medications, allergies, past medical, surgical, social and family histories were reviewed and updated as appropriate.    A detailed review of systems was obtained with pertinent positives as per the above HPI, and otherwise negative.        Objective:     Ht 5' 6" (1.676 m)   Wt 78 kg (172 lb)   BMI 27.76 kg/m²        Constitutional:   He appears well-developed. He is cooperative.     Head:  Normocephalic.     Nose:  No mucosal edema, rhinorrhea, septal deviation or polyps. Epistaxis is observed. Turbinates normal, no turbinate masses and no turbinate hypertrophy.  Right sinus exhibits no maxillary sinus tenderness and no frontal sinus tenderness. Left sinus exhibits no maxillary sinus tenderness and no frontal sinus tenderness.   Prominent vessel on left Little's area    Mouth/Throat  Oropharynx clear and moist without lesions or asymmetry. No oropharyngeal exudate or posterior oropharyngeal erythema.     Neck:  No adenopathy. Normal range of motion present.     He has no cervical adenopathy.     Procedure    Control of Epistaxis    Indication:  Epistaxis    Informed consent was obtained prior to proceeding.  The left nasal cavity was anesthetized with topical 4% lidocaine.  Bleeding was localized to the left nasal cavity in Little's area and cauterized with electrocautery with bipolar forceps.        The patient tolerated the procedure well.      Data Reviewed    WBC (K/uL)   Date Value   10/25/2022 6.03     Eosinophil % (%)   Date Value   10/25/2022 2.0     Eos # (K/uL)   Date Value   10/25/2022 0.1     Platelets (K/uL)   Date Value   10/25/2022 183     Glucose (mg/dL)   Date Value   10/25/2022 88     No results found for: " IGE        Assessment:     1. Recurrent epistaxis    2. Anticoagulated by anticoagulation treatment         Plan:     Saline spray daily and prn.  Follow up if symptoms worsen or fail to improve.

## 2022-12-01 NOTE — H&P
History    Chief complaint:  Painless progressive vision loss    Present Ilness/Diagnosis: Nuclear sclerotic Cataract    Past 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 without angina pectoris (09/11/2012), Bilateral carotid artery disease (02/09/2017), Bleeding from the nose, Bleeding nose (03/21/2018), Cataract, CKD (chronic kidney disease) stage 3, GFR 30-59 ml/min (05/27/2015), Claudication of left lower extremity (09/17/2014), Colon polyp, Encounter for blood transfusion, Gastroesophageal reflux disease without esophagitis (03/19/2018), Gastrointestinal hemorrhage associated with intestinal diverticulosis (04/01/2018), Glaucoma, H/O mechanical aortic valve replacement (09/17/2014), History of gout (09/26/2012), Hyperparathyroidism due to renal insufficiency (07/27/2015), Internal hemorrhoid (04/03/2018), Long term current use of anticoagulant therapy (09/26/2012), Mechanical heart valve present, Metabolic acidosis with normal anion gap and bicarbonate losses (03/20/2018), Mixed hyperlipidemia (09/26/2012), NSTEMI (non-ST elevated myocardial infarction) (03/21/2018), Obesity, diabetes, and hypertension syndrome (02/23/2016), PVD (peripheral vascular disease) (09/11/2012), 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    Family History/Social History: refer to chart    Allergies:   Review of patient's allergies indicates:   Allergen Reactions    Fosinopril      Intolerance- elevates potassium level      Losartan      Intolerance- elevates potassium level       Current Medications: No current facility-administered medications  for this encounter.    Current Outpatient Medications:     allopurinoL (ZYLOPRIM) 100 MG tablet, TAKE 1 TABLET EVERY DAY, Disp: 90 tablet, Rfl: 3    bumetanide (BUMEX) 1 MG tablet, Take 1 tablet (1 mg total) by mouth once daily., Disp: 30 tablet, Rfl: 11    carvediloL (COREG) 12.5 MG tablet, Take 1 tablet (12.5 mg total) by mouth 2 (two) times daily with meals., Disp: 180 tablet, Rfl: 0    ferrous gluconate (FERGON) 324 MG tablet, Take 1 tablet (324 mg total) by mouth daily with breakfast., Disp: 90 tablet, Rfl: 1    isosorbide mononitrate (IMDUR) 120 MG 24 hr tablet, Take 1 tablet (120 mg total) by mouth once daily., Disp: 90 tablet, Rfl: 4    lactose-reduced food (ENSURE ORAL), Take 1-2 cans by mouth once daily, Disp: , Rfl:     lisinopriL (PRINIVIL,ZESTRIL) 2.5 MG tablet, Take 1 tablet (2.5 mg total) by mouth once daily., Disp: 30 tablet, Rfl: 1    loperamide (IMODIUM) 2 mg capsule, Take 2 mg by mouth 4 (four) times daily as needed for Diarrhea., Disp: , Rfl:     omega-3 acid ethyl esters (LOVAZA) 1 gram capsule, Take 2 capsules (2 g total) by mouth 2 (two) times daily., Disp: 360 capsule, Rfl: 5    prednisolon/gatiflox/bromfenac (PREDNISOL ACE-GATIFLOX-BROMFEN) 1-0.5-0.075 % DrpS, Apply 1 drop to eye 3 (three) times daily. One drop 3 times a day in surgical eye, Disp: 5 mL, Rfl: 3    prednisolon/gatiflox/bromfenac (PREDNISOL ACE-GATIFLOX-BROMFEN) 1-0.5-0.075 % DrpS, Apply 1 drop to eye 3 (three) times daily. One drop 3 times a day in surgical eye (Patient not taking: Reported on 11/21/2022), Disp: 5 mL, Rfl: 3    rosuvastatin (CRESTOR) 40 MG Tab, TAKE 1 TABLET EVERY EVENING., Disp: 90 tablet, Rfl: 3    warfarin (COUMADIN) 5 MG tablet, Take 1 tablet (5 mg) by mouth Monday, Tuesday, Wednesday, Friday & Saturday then 1.5 tablets (7.5mg) by mouth all other days (Thurs & Sun) as instructed by [facility] Clinic. (Patient taking differently: Take 1 tablet (5 mg) by mouth Monday&Friday then 1.5 tablets (7.5mg) by mouth  all other days as instructed by Coumadin Clinic.), Disp: 143 tablet, Rfl: 0    Facility-Administered Medications Ordered in Other Encounters:     ondansetron injection 4 mg, 4 mg, Intravenous, Daily PRN, Tara Hernandez MD    phenylephrine HCL 2.5% ophthalmic solution 1 drop, 1 drop, Left Eye, On Call Procedure, Alia Mckeon MD, 1 drop at 11/13/22 0630    proparacaine 0.5 % ophthalmic solution 1 drop, 1 drop, Left Eye, On Call Procedure, Alia Mckeon MD, 1 drop at 11/13/22 0620    tropicamide 1% ophthalmic solution 1 drop, 1 drop, Left Eye, On Call Procedure, Alia Mckeon MD, 1 drop at 11/13/22 0630    Physical Exam    BP: Vital signs stable  General: No apparent distress  HEENT: nuclear sclerotic cataract  Lungs: adequate respirations  Heart: + pulses  Abdomen: soft  Rectal/pelvic: deferred    Impression: Visually significant Cataract.    See previous clinic notes for surgical indications.    Plan: Phacoemulsification with implantation of Intraocular lens

## 2022-12-02 NOTE — PROGRESS NOTES
12/2/22 Wife called and states that patient is continuing to have nosebleeds. Had blood vessels in his nose cauterized on 11/30/22. Patient is scheduled for cataract surgery on 12/4/22. Please advise

## 2022-12-04 ENCOUNTER — ANESTHESIA EVENT (OUTPATIENT)
Dept: SURGERY | Facility: HOSPITAL | Age: 82
End: 2022-12-04
Payer: MEDICARE

## 2022-12-04 ENCOUNTER — ANESTHESIA (OUTPATIENT)
Dept: SURGERY | Facility: HOSPITAL | Age: 82
End: 2022-12-04
Payer: MEDICARE

## 2022-12-04 ENCOUNTER — HOSPITAL ENCOUNTER (OUTPATIENT)
Facility: HOSPITAL | Age: 82
Discharge: HOME | End: 2022-12-04
Attending: OPHTHALMOLOGY | Admitting: OPHTHALMOLOGY
Payer: MEDICARE

## 2022-12-04 VITALS
OXYGEN SATURATION: 100 % | TEMPERATURE: 98 F | SYSTOLIC BLOOD PRESSURE: 129 MMHG | DIASTOLIC BLOOD PRESSURE: 62 MMHG | RESPIRATION RATE: 20 BRPM | HEART RATE: 58 BPM | WEIGHT: 172 LBS | BODY MASS INDEX: 27.76 KG/M2

## 2022-12-04 DIAGNOSIS — H25.10 SENILE NUCLEAR SCLEROSIS: ICD-10-CM

## 2022-12-04 DIAGNOSIS — H25.11 NUCLEAR SCLEROTIC CATARACT OF RIGHT EYE: Primary | ICD-10-CM

## 2022-12-04 LAB — POCT GLUCOSE: 106 MG/DL (ref 70–110)

## 2022-12-04 PROCEDURE — 37000008 HC ANESTHESIA 1ST 15 MINUTES: Performed by: OPHTHALMOLOGY

## 2022-12-04 PROCEDURE — 82962 GLUCOSE BLOOD TEST: CPT | Performed by: OPHTHALMOLOGY

## 2022-12-04 PROCEDURE — D9220A PRA ANESTHESIA: ICD-10-PCS | Mod: CRNA,,, | Performed by: NURSE ANESTHETIST, CERTIFIED REGISTERED

## 2022-12-04 PROCEDURE — 36000706: Performed by: OPHTHALMOLOGY

## 2022-12-04 PROCEDURE — 71000044 HC DOSC ROUTINE RECOVERY FIRST HOUR: Performed by: OPHTHALMOLOGY

## 2022-12-04 PROCEDURE — 36000707: Performed by: OPHTHALMOLOGY

## 2022-12-04 PROCEDURE — 71000015 HC POSTOP RECOV 1ST HR: Performed by: OPHTHALMOLOGY

## 2022-12-04 PROCEDURE — 37000009 HC ANESTHESIA EA ADD 15 MINS: Performed by: OPHTHALMOLOGY

## 2022-12-04 PROCEDURE — D9220A PRA ANESTHESIA: Mod: CRNA,,, | Performed by: NURSE ANESTHETIST, CERTIFIED REGISTERED

## 2022-12-04 PROCEDURE — 66984 PR REMOVAL, CATARACT, W/INSRT INTRAOC LENS, W/O ENDO CYCLO: ICD-10-PCS | Mod: 79,RT,, | Performed by: OPHTHALMOLOGY

## 2022-12-04 PROCEDURE — D9220A PRA ANESTHESIA: Mod: ANES,,, | Performed by: ANESTHESIOLOGY

## 2022-12-04 PROCEDURE — 66984 XCAPSL CTRC RMVL W/O ECP: CPT | Mod: 79,RT,, | Performed by: OPHTHALMOLOGY

## 2022-12-04 PROCEDURE — 25000003 PHARM REV CODE 250: Performed by: NURSE ANESTHETIST, CERTIFIED REGISTERED

## 2022-12-04 PROCEDURE — D9220A PRA ANESTHESIA: ICD-10-PCS | Mod: ANES,,, | Performed by: ANESTHESIOLOGY

## 2022-12-04 PROCEDURE — 25000003 PHARM REV CODE 250: Performed by: OPHTHALMOLOGY

## 2022-12-04 PROCEDURE — V2632 POST CHMBR INTRAOCULAR LENS: HCPCS | Performed by: OPHTHALMOLOGY

## 2022-12-04 PROCEDURE — 63600175 PHARM REV CODE 636 W HCPCS: Performed by: NURSE ANESTHETIST, CERTIFIED REGISTERED

## 2022-12-04 PROCEDURE — 63600175 PHARM REV CODE 636 W HCPCS: Performed by: OPHTHALMOLOGY

## 2022-12-04 DEVICE — LENS EYHANCE +21.5D: Type: IMPLANTABLE DEVICE | Site: EYE | Status: FUNCTIONAL

## 2022-12-04 RX ORDER — MIDAZOLAM HYDROCHLORIDE 1 MG/ML
INJECTION, SOLUTION INTRAMUSCULAR; INTRAVENOUS
Status: DISCONTINUED | OUTPATIENT
Start: 2022-12-04 | End: 2022-12-04

## 2022-12-04 RX ORDER — PROPARACAINE HYDROCHLORIDE 5 MG/ML
1 SOLUTION/ DROPS OPHTHALMIC
Status: DISCONTINUED | OUTPATIENT
Start: 2022-12-04 | End: 2023-05-16

## 2022-12-04 RX ORDER — ONDANSETRON 2 MG/ML
4 INJECTION INTRAMUSCULAR; INTRAVENOUS DAILY PRN
Status: CANCELLED | OUTPATIENT
Start: 2022-12-04

## 2022-12-04 RX ORDER — TROPICAMIDE 10 MG/ML
1 SOLUTION/ DROPS OPHTHALMIC
Status: DISCONTINUED | OUTPATIENT
Start: 2022-12-04 | End: 2023-05-16

## 2022-12-04 RX ORDER — LIDOCAINE HYDROCHLORIDE 10 MG/ML
INJECTION, SOLUTION EPIDURAL; INFILTRATION; INTRACAUDAL; PERINEURAL
Status: DISCONTINUED | OUTPATIENT
Start: 2022-12-04 | End: 2022-12-04 | Stop reason: HOSPADM

## 2022-12-04 RX ORDER — ACETAMINOPHEN 325 MG/1
650 TABLET ORAL EVERY 4 HOURS PRN
Status: DISCONTINUED | OUTPATIENT
Start: 2022-12-04 | End: 2022-12-04 | Stop reason: HOSPADM

## 2022-12-04 RX ORDER — MOXIFLOXACIN 5 MG/ML
SOLUTION/ DROPS OPHTHALMIC
Status: DISCONTINUED | OUTPATIENT
Start: 2022-12-04 | End: 2022-12-04 | Stop reason: HOSPADM

## 2022-12-04 RX ORDER — MOXIFLOXACIN 5 MG/ML
1 SOLUTION/ DROPS OPHTHALMIC
Status: COMPLETED | OUTPATIENT
Start: 2022-12-04 | End: 2022-12-04

## 2022-12-04 RX ORDER — PREDNISOLONE ACETATE 10 MG/ML
SUSPENSION/ DROPS OPHTHALMIC
Status: DISCONTINUED | OUTPATIENT
Start: 2022-12-04 | End: 2022-12-04 | Stop reason: HOSPADM

## 2022-12-04 RX ORDER — KETOROLAC TROMETHAMINE 5 MG/ML
1 SOLUTION OPHTHALMIC ONCE
Status: COMPLETED | OUTPATIENT
Start: 2022-12-04 | End: 2022-12-04

## 2022-12-04 RX ORDER — PHENYLEPHRINE HYDROCHLORIDE 25 MG/ML
1 SOLUTION/ DROPS OPHTHALMIC
Status: DISCONTINUED | OUTPATIENT
Start: 2022-12-04 | End: 2023-05-16

## 2022-12-04 RX ORDER — ONDANSETRON 2 MG/ML
4 INJECTION INTRAMUSCULAR; INTRAVENOUS DAILY PRN
Status: DISCONTINUED | OUTPATIENT
Start: 2022-12-04 | End: 2023-05-16

## 2022-12-04 RX ORDER — LIDOCAINE HYDROCHLORIDE 40 MG/ML
INJECTION, SOLUTION RETROBULBAR
Status: DISCONTINUED | OUTPATIENT
Start: 2022-12-04 | End: 2022-12-04 | Stop reason: HOSPADM

## 2022-12-04 RX ADMIN — MIDAZOLAM HYDROCHLORIDE 1 MG: 1 INJECTION, SOLUTION INTRAMUSCULAR; INTRAVENOUS at 08:12

## 2022-12-04 RX ADMIN — PHENYLEPHRINE HYDROCHLORIDE 1 DROP: 25 SOLUTION/ DROPS OPHTHALMIC at 06:12

## 2022-12-04 RX ADMIN — SODIUM CHLORIDE: 0.9 INJECTION, SOLUTION INTRAVENOUS at 08:12

## 2022-12-04 RX ADMIN — TROPICAMIDE 1 DROP: 10 SOLUTION/ DROPS OPHTHALMIC at 06:12

## 2022-12-04 RX ADMIN — KETOROLAC TROMETHAMINE 1 DROP: 5 SOLUTION OPHTHALMIC at 06:12

## 2022-12-04 RX ADMIN — MOXIFLOXACIN OPHTHALMIC 1 DROP: 5 SOLUTION/ DROPS OPHTHALMIC at 06:12

## 2022-12-04 RX ADMIN — PROPARACAINE HYDROCHLORIDE 1 DROP: 5 SOLUTION/ DROPS OPHTHALMIC at 06:12

## 2022-12-04 NOTE — BRIEF OP NOTE
BRIEF DISCHARGE NOTE:    Date of discharge: 12/04/2022    Reason for hospitalization -  Cataract surgery     Final Diagnosis - Visually significant Cataract    Procedures and treatment provided - Status post phacoemulsification with placement of intraocular lens     Diet - Advance to regular as tolerated    Activity - as tolerated    Disposition at the end of the case - Good.    Discharge: to home    The patient tolerated the procedure well and knows to follow up with me tomorrow morning in the eye clinic, sooner if needed.    Patient and family instructions (as appropriate) - Given to patient on discharge    Alia Mckeon MD

## 2022-12-04 NOTE — OP NOTE
DATE OF PROCEDURE: 12/04/2022    SURGEON: PINKY WATSON MD    PREOPERATIVE DIAGNOSIS:  Senile nuclear sclerotic cataract right eye.     POSTOPERATIVE DIAGNOSIS: Senile nuclear sclerotic cataract right eye.     PROCEDURE PERFORMED:  Phacoemulsification with placement of intraocular lens, right eye.    IMPLANT:  DIBOO  21.5    ANESTHESIA:  Topical and MAC    COMPLICATIONS: none    ESTIMATED BLOOD LOSS: <1cc    SPECIMENS: none    INDICATIONS FOR PROCEDURE:   The patient has a history of painless progressive vision loss.  The patient has described difficulties with activities of daily living, which specifically include driving, which is secondary to cataract formation and progression. After we had a thorough discussion about risks, benefits, and alternatives to cataract surgery, the patient agreed to proceed with phacoemulsification and implantation of a lens in the right eye.  These risks include, but are not limited to, hemorrhage, pain, infection, need for additional surgery, need for glasses or contacts, loss of vision, or even loss of the eye.    PROCEDURE IN DETAIL:  The patient was met in the preop holding area.  Consent was confirmed to be signed.  The operative site was marked.  The patient was brought into the operating room by the anesthesia team and placed under monitored anesthesia care.  The right eye was prepped and draped in a sterile ophthalmic fashion.  A Lianet speculum was placed into the right eye.   A paracentesis site was made and 1% preservative-free lidocaine was injected into the anterior chamber.  Viscoelastic  material was injected into the anterior chamber.  A keratome blade was used to make a clear corneal incision.  A cystotome was used to initiate the continuous curvilinear capsulorrhexis which was completed with Utrata forceps.  BSS on a hernandez cannula was used to perform hydrodissection.  The phacoemulsification tip was introduced into the eye and the nucleus was removed in a  standard divide-and-conquer fashion.  Remaining cortical material was removed from the eye using irrigation-aspiration.  The capsular bag was filled with viscoelastic material and the intraocular lens was injected and positioned into place. Remaining viscoelastic material was removed from the eye using irrigation and aspiration.  The corneal wounds were hydrated.  The eye was filled to physiologic pressure. The wounds were found to be watertight. Drops of Vigamox and prednisilone were placed into the eye.  The eye was washed, dried, and shielded.  The patient tolerated the procedure well and knows to follow up with me tomorrow morning, sooner if needed.

## 2022-12-04 NOTE — ANESTHESIA PREPROCEDURE EVALUATION
12/04/2022  Dariel Urbina is a 82 y.o., male.      Pre-op Assessment    I have reviewed the Patient Summary Reports.     I have reviewed the Nursing Notes.    I have reviewed the Medications.     Review of Systems  Anesthesia Hx:  No problems with previous Anesthesia  History of prior surgery of interest to airway management or planning: heart surgery. Previous anesthesia: General Denies Family Hx of Anesthesia complications.   Denies Personal Hx of Anesthesia complications.   Social:  Non-Smoker, Social Alcohol Use    Hematology/Oncology:     Oncology Normal    -- Anemia:   EENT/Dental:EENT/Dental Normal   Cardiovascular:   Hypertension Past MI CAD  CABG/stent (CABG/AVR 2000)  Angina PVD hyperlipidemia  Denies LÓPEZ. ECG has been reviewed.    Pulmonary:  Pulmonary Normal  Denies Shortness of breath.    Renal/:   Chronic Renal Disease, CRI    Hepatic/GI:   GERD    Musculoskeletal:  Spine Disorders: lumbar    Neurological:   Denies TIA. Denies CVA.   Peripheral Neuropathy    Endocrine:   Diabetes, type 2 Denies Hypothyroidism. Denies Hyperthyroidism. hyperparathyroid   Dermatological:  Skin Normal    Psych:  Psychiatric Normal           Physical Exam  General: Well nourished, Alert, Cooperative and Oriented    Airway:  Mallampati: II   Mouth Opening: Normal  TM Distance: Normal  Tongue: Normal  Neck ROM: Normal ROM    Dental:        Anesthesia Plan  Type of Anesthesia, risks & benefits discussed:    Anesthesia Type: MAC  Intra-op Monitoring Plan: Standard ASA Monitors  Induction:  IV  Informed Consent: Informed consent signed with the Patient and all parties understand the risks and agree with anesthesia plan.  All questions answered.   ASA Score: 3    Ready For Surgery From Anesthesia Perspective.     .

## 2022-12-04 NOTE — ANESTHESIA POSTPROCEDURE EVALUATION
Anesthesia Post Evaluation    Patient: Dariel Urbina    Procedure(s) Performed: Procedure(s) (LRB):  PHACOEMULSIFICATION, CATARACT (Right)  INSERTION, IOL PROSTHESIS (Right)    Final Anesthesia Type: MAC      Patient location during evaluation: PACU  Patient participation: Yes- Able to Participate  Level of consciousness: awake and alert  Post-procedure vital signs: reviewed and stable  Pain management: adequate  Airway patency: patent    PONV status at discharge: No PONV  Anesthetic complications: no      Cardiovascular status: hemodynamically stable  Respiratory status: spontaneous ventilation, room air and unassisted  Hydration status: euvolemic  Follow-up not needed.          Vitals Value Taken Time   /62 12/04/22 0912   Temp 36.9 °C (98.4 °F) 12/04/22 0912   Pulse 58 12/04/22 0912   Resp 20 12/04/22 0912   SpO2 100 % 12/04/22 0912         No case tracking events are documented in the log.      Pain/Sanjeev Score: Sanjeev Score: 10 (12/4/2022  8:38 AM)

## 2022-12-04 NOTE — ADDENDUM NOTE
Addendum  created 12/04/22 1144 by Tara Hernandez MD    Order list changed, Pharmacy for encounter modified

## 2022-12-04 NOTE — TRANSFER OF CARE
Anesthesia Transfer of Care Note    Patient: Dariel Urbina    Procedure(s) Performed: Procedure(s) (LRB):  PHACOEMULSIFICATION, CATARACT (Right)  INSERTION, IOL PROSTHESIS (Right)    Patient location: [facility]    Anesthesia Type: MAC    Transport from OR: Transported from OR on room air with adequate spontaneous ventilation    Post pain: adequate analgesia    Post assessment: no apparent anesthetic complications and tolerated procedure well    Post vital signs: stable    Level of consciousness: awake    Nausea/Vomiting: no nausea/vomiting    Complications: none    Transfer of care protocol was followed      Last vitals:   Visit Vitals  BP (!) 151/67   Pulse (!) 57   Temp 36.9 °C (98.4 °F) (Temporal)   Resp 20   Wt 78 kg (172 lb)   SpO2 100%   BMI 27.76 kg/m²

## 2022-12-05 ENCOUNTER — LAB VISIT (OUTPATIENT)
Dept: LAB | Facility: HOSPITAL | Age: 82
End: 2022-12-05
Attending: INTERNAL MEDICINE
Payer: MEDICARE

## 2022-12-05 ENCOUNTER — OFFICE VISIT (OUTPATIENT)
Dept: OPHTHALMOLOGY | Facility: CLINIC | Age: 82
End: 2022-12-05
Payer: MEDICARE

## 2022-12-05 ENCOUNTER — ANTI-COAG VISIT (OUTPATIENT)
Dept: CARDIOLOGY | Facility: CLINIC | Age: 82
End: 2022-12-05
Payer: MEDICARE

## 2022-12-05 DIAGNOSIS — Z79.01 LONG TERM CURRENT USE OF ANTICOAGULANT THERAPY: ICD-10-CM

## 2022-12-05 DIAGNOSIS — Z79.01 LONG TERM CURRENT USE OF ANTICOAGULANT THERAPY: Primary | ICD-10-CM

## 2022-12-05 DIAGNOSIS — Z98.42 STATUS POST CATARACT EXTRACTION AND INSERTION OF INTRAOCULAR LENS, LEFT: ICD-10-CM

## 2022-12-05 DIAGNOSIS — Z95.2 H/O MECHANICAL AORTIC VALVE REPLACEMENT: ICD-10-CM

## 2022-12-05 DIAGNOSIS — Z96.1 STATUS POST CATARACT EXTRACTION AND INSERTION OF INTRAOCULAR LENS, RIGHT: Primary | ICD-10-CM

## 2022-12-05 DIAGNOSIS — Z98.41 STATUS POST CATARACT EXTRACTION AND INSERTION OF INTRAOCULAR LENS, RIGHT: Primary | ICD-10-CM

## 2022-12-05 DIAGNOSIS — H40.012 OAG (OPEN ANGLE GLAUCOMA) SUSPECT, LOW RISK, LEFT: ICD-10-CM

## 2022-12-05 DIAGNOSIS — H35.372 EPIRETINAL MEMBRANE, LEFT: ICD-10-CM

## 2022-12-05 DIAGNOSIS — Z96.1 STATUS POST CATARACT EXTRACTION AND INSERTION OF INTRAOCULAR LENS, LEFT: ICD-10-CM

## 2022-12-05 LAB
INR PPP: 3.2 (ref 0.8–1.2)
PROTHROMBIN TIME: 31.2 SEC (ref 9–12.5)

## 2022-12-05 PROCEDURE — 99024 PR POST-OP FOLLOW-UP VISIT: ICD-10-PCS | Mod: S$GLB,,, | Performed by: OPHTHALMOLOGY

## 2022-12-05 PROCEDURE — 99024 POSTOP FOLLOW-UP VISIT: CPT | Mod: S$GLB,,, | Performed by: OPHTHALMOLOGY

## 2022-12-05 PROCEDURE — 99999 PR PBB SHADOW E&M-EST. PATIENT-LVL III: ICD-10-PCS | Mod: PBBFAC,,, | Performed by: OPHTHALMOLOGY

## 2022-12-05 PROCEDURE — 1126F AMNT PAIN NOTED NONE PRSNT: CPT | Mod: CPTII,S$GLB,, | Performed by: OPHTHALMOLOGY

## 2022-12-05 PROCEDURE — 1159F PR MEDICATION LIST DOCUMENTED IN MEDICAL RECORD: ICD-10-PCS | Mod: CPTII,S$GLB,, | Performed by: OPHTHALMOLOGY

## 2022-12-05 PROCEDURE — 93793 PR ANTICOAGULANT MGMT FOR PT TAKING WARFARIN: ICD-10-PCS | Mod: S$GLB,,,

## 2022-12-05 PROCEDURE — 85610 PROTHROMBIN TIME: CPT | Performed by: INTERNAL MEDICINE

## 2022-12-05 PROCEDURE — 3288F FALL RISK ASSESSMENT DOCD: CPT | Mod: CPTII,S$GLB,, | Performed by: OPHTHALMOLOGY

## 2022-12-05 PROCEDURE — 1159F MED LIST DOCD IN RCRD: CPT | Mod: CPTII,S$GLB,, | Performed by: OPHTHALMOLOGY

## 2022-12-05 PROCEDURE — 1126F PR PAIN SEVERITY QUANTIFIED, NO PAIN PRESENT: ICD-10-PCS | Mod: CPTII,S$GLB,, | Performed by: OPHTHALMOLOGY

## 2022-12-05 PROCEDURE — 1101F PR PT FALLS ASSESS DOC 0-1 FALLS W/OUT INJ PAST YR: ICD-10-PCS | Mod: CPTII,S$GLB,, | Performed by: OPHTHALMOLOGY

## 2022-12-05 PROCEDURE — 36415 COLL VENOUS BLD VENIPUNCTURE: CPT | Performed by: INTERNAL MEDICINE

## 2022-12-05 PROCEDURE — 3288F PR FALLS RISK ASSESSMENT DOCUMENTED: ICD-10-PCS | Mod: CPTII,S$GLB,, | Performed by: OPHTHALMOLOGY

## 2022-12-05 PROCEDURE — 93793 ANTICOAG MGMT PT WARFARIN: CPT | Mod: S$GLB,,,

## 2022-12-05 PROCEDURE — 99999 PR PBB SHADOW E&M-EST. PATIENT-LVL III: CPT | Mod: PBBFAC,,, | Performed by: OPHTHALMOLOGY

## 2022-12-05 PROCEDURE — 1101F PT FALLS ASSESS-DOCD LE1/YR: CPT | Mod: CPTII,S$GLB,, | Performed by: OPHTHALMOLOGY

## 2022-12-05 NOTE — PROGRESS NOTES
HPI    Referred by      S/p Phaco w/IOL OS 11/13/2022   S/p Phaco w/IOL OD 12/04/2022  Glaucoma Suspect OS   ON Cupping OU   ERM OS   Type 2 DM no DR     PGB TID OD    82 y.o. male is here for 1 day PO OD. Denies eye pain and   flashes/floaters. Last night both eyes were watery.     Last edited by RUTH Jose on 12/5/2022  1:57 PM.            Assessment /Plan     For exam results, see Encounter Report.    Status post cataract extraction and insertion of intraocular lens, right    Status post cataract extraction and insertion of intraocular lens, left    Epiretinal membrane, left    OAG (open angle glaucoma) suspect, low risk, left    Slit Lamp Exam  L/L - normal  C/s - quiet  Cornea - clear  A/C - 1+ cell  Lens - PCIOL    POD #1 s/p phaco/IOL  - doing well  - continue the following drops:    vigamox or ocuflox TID x 1 wk then stop  Pred forte or durezol or dexamethasone TID x  4 wks  Ketorolac TID until runs out    Versus:    Combination drop - 1 drop TID x total of 1 month    Appropriate precautions and post op medications reviewed.  Patient instructed to call or come in if symptoms of redness, decreased vision, or pain are experienced.    -f/up 4wks, sooner PRN. optom

## 2022-12-06 NOTE — TELEPHONE ENCOUNTER
Provider Staff- Action is required     If a refill request needs assessment, Please pend appropriate medication(s) for patient and route the refill request to the Centralized Refill Staff Pool for assessment.     If medication is already pended in a previous encounter, please add any call/ encounter notes to that previous encounter and route that encounter to the [facility] staff pool High Priority.     If medication is not pended as a Refill Request the data required for the Refill Center to assess will not generate and the medications will not be able to be assessed properly by the Refill Center.     Thank you for your assistance!   [facility]     Note composed:4:13 PM 12/06/2022

## 2022-12-06 NOTE — TELEPHONE ENCOUNTER
----- Message from Nelida Everett sent at 12/6/2022  3:27 PM CST -----  Contact: pharmacy/[email or web address]/[phone]  Requesting an RX refill or new RX.  Is this a refill or new RX: refill  /paste from chart):  lisinopril (PRINIVIL,ZESTRIL) 2.5 MG tablet 30 tablet 1 10/25/2022 12/24/2022  Sig: Take 1 tablet (2.5 mg total) by mouth once daily.  Is this a 30 day or 90 day RX: 90  Pharmacy name and phone # (copy/paste from chart):    The doctors have asked that we provide their patients with the following 2 reminders -- prescription refills can take up to 72 hours, and a friendly reminder that in the future you can use your [facility] account to request refills: call back      Requesting an RX refill or new RX.  Is this a refill or new RX: refill  RX name and strength (copy/paste from chart):  bumetanide (BUMEX) 1 MG tablet 30 tablet 11 10/25/2022 10/25/2023  Sig: Take 1 tablet (1 mg total) by mouth once daily.  Is this a 30 day or 90 day RX: 90  Pharmacy name and phone # (copy/paste from chart):    The doctors have asked that we provide their patients with the following 2 reminders -- prescription refills can take up to 72 hours, and a friendly reminder that in the future you can use your [facility] account to request refills: call back      Please advise

## 2022-12-06 NOTE — TELEPHONE ENCOUNTER
Care Due:                  Date            Visit Type   Department     Provider  --------------------------------------------------------------------------------                                EP -                              PRIMARY      [facility] INTERNAL  Last Visit: 11-      CARE ([facility])   JOSHUA Langston                              [facility]                              PRIMARY      [facility] INTERNAL  Next Visit: 02-      CARE ([facility])   [facility]       Devon Langston                                                            Last  Test          Frequency    Reason                     Performed    Due Date  --------------------------------------------------------------------------------    Lipid Panel.  12 months..  rosuvastatin.............  02-   02-    Uric Acid...  12 months..  allopurinoL..............  Not Found    Overdue    [employer] Embedded Care Gaps. Reference number: [id number]. 12/06/2022   4:29:44 PM CST

## 2022-12-07 ENCOUNTER — TELEPHONE (OUTPATIENT)
Dept: DERMATOLOGY | Facility: CLINIC | Age: 82
End: 2022-12-07
Payer: MEDICARE

## 2022-12-07 RX ORDER — BUMETANIDE 1 MG/1
1 TABLET ORAL DAILY
Qty: 30 TABLET | Refills: 11 | Status: ON HOLD | OUTPATIENT
Start: 2022-12-07 | End: 2023-04-15 | Stop reason: SDUPTHER

## 2022-12-07 RX ORDER — LISINOPRIL 2.5 MG/1
2.5 TABLET ORAL DAILY
Qty: 30 TABLET | Refills: 1 | Status: SHIPPED | OUTPATIENT
Start: 2022-12-07 | End: 2023-03-01

## 2022-12-07 NOTE — TELEPHONE ENCOUNTER
Provider Staff:     Action is required for this patient.   Please see care gap opportunities below in Care Due Message.     Thanks!  [facility]     Appointments      Date Provider   Last Visit   11/14/2022 Devon Langston Jr., MD   Next Visit   2/14/2023 Devon Langston Jr., MD     Note composed:3:06 PM 12/07/2022

## 2022-12-07 NOTE — PROGRESS NOTES
1. Skin, vertex scalp, shave biopsy:   -SQUAMOUS CELL CARCINOMA IN-SITU, EXTENDING TO THE PERIPHERAL BIOPSY EDGES   This lesion is skin cancer. You will be contacted regarding treatment.   2. Skin, left mid cheek, shave biopsy:   -ACTINIC KERATOSIS, INVOLVING HAIR FOLLICLES AND EXTENDING TO THE BASE OF THE BIOPSY   This lesion is atypical and further treatment may be required. You will be   contacted by your provider's office.     Please coordinate Mohs surgery for treatment of #1 due to large size of lesion. Photo in chart. Of note, patient is on coumadin and has a heart valve replacement.

## 2022-12-07 NOTE — TELEPHONE ENCOUNTER
Refill Routing Note   Medication(s) are not appropriate for processing by [facility] for the following reason(s):      - Required laboratory values are abnormal (Cr)    [facility] action(s):  Defer Medication-related problems identified: Requires labs     Medication Therapy Plan: Labs (Lipid panel, uric acid)  Medication reconciliation completed: No     Appointments  past 12m or future 3m with PCP    Date Provider   Last Visit   11/14/2022 Devon Langston Jr., MD   Next Visit   2/14/2023 Devon Langston Jr., MD   ED visits in past 90 days: 0        Note composed:1:16 PM 12/07/2022

## 2022-12-07 NOTE — TELEPHONE ENCOUNTER
Spoke with Mr. Urbina's wife on phone about recent skin biopsy results.       Final Pathologic Diagnosis   Date Value Ref Range Status   11/14/2022   Final    1. Skin, vertex scalp, shave biopsy:  -SQUAMOUS CELL CARCINOMA IN-SITU, EXTENDING TO THE PERIPHERAL BIOPSY EDGES  This lesion is skin cancer. You will be contacted regarding treatment.  2. Skin, left mid cheek, shave biopsy:  -ACTINIC KERATOSIS, INVOLVING HAIR FOLLICLES AND EXTENDING TO THE BASE OF THE  BIOPSY  This lesion is atypical and further treatment may be required. You will be  contacted by your provider's office.       Comment:     Interp By Loi Bob M.D., Signed on 11/30/2022 at 14:36             Due to large tumor size and concern for underlying invasive SCC I feel that Mohs surgery will be best treatment option for him.  He is on Coumadin and has a heart valve replacement.  Referral sent to Mohs staff for this.  I would like to see him 3 months following procedure to check site and biopsy proven AK on his left cheek.

## 2022-12-19 ENCOUNTER — ANTI-COAG VISIT (OUTPATIENT)
Dept: CARDIOLOGY | Facility: CLINIC | Age: 82
End: 2022-12-19
Payer: MEDICARE

## 2022-12-19 DIAGNOSIS — Z79.01 LONG TERM CURRENT USE OF ANTICOAGULANT THERAPY: Primary | ICD-10-CM

## 2022-12-19 DIAGNOSIS — Z95.2 H/O MECHANICAL AORTIC VALVE REPLACEMENT: ICD-10-CM

## 2022-12-19 PROCEDURE — 93793 PR ANTICOAGULANT MGMT FOR PT TAKING WARFARIN: ICD-10-PCS | Mod: S$GLB,,,

## 2022-12-19 PROCEDURE — 93793 ANTICOAG MGMT PT WARFARIN: CPT | Mod: S$GLB,,,

## 2022-12-21 ENCOUNTER — HOSPITAL ENCOUNTER (INPATIENT)
Facility: HOSPITAL | Age: 82
LOS: 6 days | Discharge: HOME | DRG: 150 | End: 2022-12-28
Attending: EMERGENCY MEDICINE | Admitting: EMERGENCY MEDICINE
Payer: MEDICARE

## 2022-12-21 DIAGNOSIS — D62 ACUTE BLOOD LOSS ANEMIA: ICD-10-CM

## 2022-12-21 DIAGNOSIS — Z79.01 LONG TERM CURRENT USE OF ANTICOAGULANT THERAPY: ICD-10-CM

## 2022-12-21 DIAGNOSIS — R04.0 EPISTAXIS: Primary | ICD-10-CM

## 2022-12-21 DIAGNOSIS — R07.9 CHEST PAIN: ICD-10-CM

## 2022-12-21 DIAGNOSIS — I25.10 CORONARY ARTERY DISEASE: ICD-10-CM

## 2022-12-21 DIAGNOSIS — T45.511A POISONING BY WARFARIN SODIUM, ACCIDENTAL OR UNINTENTIONAL, INITIAL ENCOUNTER: ICD-10-CM

## 2022-12-21 DIAGNOSIS — N18.32 STAGE 3B CHRONIC KIDNEY DISEASE: Chronic | ICD-10-CM

## 2022-12-21 DIAGNOSIS — I21.4 NSTEMI (NON-ST ELEVATED MYOCARDIAL INFARCTION): ICD-10-CM

## 2022-12-21 DIAGNOSIS — D64.9 SYMPTOMATIC ANEMIA: ICD-10-CM

## 2022-12-21 DIAGNOSIS — H25.12 NUCLEAR SCLEROTIC CATARACT OF LEFT EYE: ICD-10-CM

## 2022-12-21 DIAGNOSIS — Z95.2 H/O MECHANICAL AORTIC VALVE REPLACEMENT: ICD-10-CM

## 2022-12-21 DIAGNOSIS — R79.1 SUPRATHERAPEUTIC INR: ICD-10-CM

## 2022-12-21 DIAGNOSIS — Z79.01 ON WARFARIN THERAPY: ICD-10-CM

## 2022-12-21 PROBLEM — N17.9 AKI (ACUTE KIDNEY INJURY): Status: ACTIVE | Noted: 2022-12-21

## 2022-12-21 PROBLEM — E66.01 MORBID OBESITY: Chronic | Status: RESOLVED | Noted: 2019-07-24 | Resolved: 2022-12-21

## 2022-12-21 LAB
ALBUMIN SERPL BCP-MCNC: 3.6 G/DL (ref 3.5–5.2)
ALP SERPL-CCNC: 55 U/L (ref 55–135)
ALT SERPL W/O P-5'-P-CCNC: 15 U/L (ref 10–44)
ANION GAP SERPL CALC-SCNC: 10 MMOL/L (ref 8–16)
AST SERPL-CCNC: 16 U/L (ref 10–40)
BASOPHILS # BLD AUTO: 0.03 K/UL (ref 0–0.2)
BASOPHILS # BLD AUTO: 0.04 K/UL (ref 0–0.2)
BASOPHILS NFR BLD: 0.4 % (ref 0–1.9)
BASOPHILS NFR BLD: 0.7 % (ref 0–1.9)
BILIRUB SERPL-MCNC: 0.6 MG/DL (ref 0.1–1)
BNP SERPL-MCNC: 385 PG/ML (ref 0–99)
BUN SERPL-MCNC: 62 MG/DL (ref 8–23)
CALCIUM SERPL-MCNC: 9.6 MG/DL (ref 8.7–10.5)
CHLORIDE SERPL-SCNC: 109 MMOL/L (ref 95–110)
CO2 SERPL-SCNC: 20 MMOL/L (ref 23–29)
CREAT SERPL-MCNC: 2.5 MG/DL (ref 0.5–1.4)
DIFFERENTIAL METHOD: ABNORMAL
DIFFERENTIAL METHOD: ABNORMAL
EOSINOPHIL # BLD AUTO: 0.2 K/UL (ref 0–0.5)
EOSINOPHIL # BLD AUTO: 0.2 K/UL (ref 0–0.5)
EOSINOPHIL NFR BLD: 2.4 % (ref 0–8)
EOSINOPHIL NFR BLD: 2.9 % (ref 0–8)
ERYTHROCYTE [DISTWIDTH] IN BLOOD BY AUTOMATED COUNT: 15.2 % (ref 11.5–14.5)
ERYTHROCYTE [DISTWIDTH] IN BLOOD BY AUTOMATED COUNT: 15.2 % (ref 11.5–14.5)
EST. GFR  (NO RACE VARIABLE): 25.2 ML/MIN/1.73 M^2
GLUCOSE SERPL-MCNC: 99 MG/DL (ref 70–110)
HCT VFR BLD AUTO: 24.4 % (ref 40–54)
HCT VFR BLD AUTO: 25.3 % (ref 40–54)
HGB BLD-MCNC: 7.7 G/DL (ref 14–18)
HGB BLD-MCNC: 7.8 G/DL (ref 14–18)
IMM GRANULOCYTES # BLD AUTO: 0.02 K/UL (ref 0–0.04)
IMM GRANULOCYTES # BLD AUTO: 0.02 K/UL (ref 0–0.04)
IMM GRANULOCYTES NFR BLD AUTO: 0.3 % (ref 0–0.5)
IMM GRANULOCYTES NFR BLD AUTO: 0.3 % (ref 0–0.5)
INR PPP: 4 (ref 0.8–1.2)
LYMPHOCYTES # BLD AUTO: 1.2 K/UL (ref 1–4.8)
LYMPHOCYTES # BLD AUTO: 1.4 K/UL (ref 1–4.8)
LYMPHOCYTES NFR BLD: 19.8 % (ref 18–48)
LYMPHOCYTES NFR BLD: 20.7 % (ref 18–48)
MCH RBC QN AUTO: 30.2 PG (ref 27–31)
MCH RBC QN AUTO: 30.9 PG (ref 27–31)
MCHC RBC AUTO-ENTMCNC: 30.8 G/DL (ref 32–36)
MCHC RBC AUTO-ENTMCNC: 31.6 G/DL (ref 32–36)
MCV RBC AUTO: 98 FL (ref 82–98)
MCV RBC AUTO: 98 FL (ref 82–98)
MONOCYTES # BLD AUTO: 0.5 K/UL (ref 0.3–1)
MONOCYTES # BLD AUTO: 0.6 K/UL (ref 0.3–1)
MONOCYTES NFR BLD: 7.7 % (ref 4–15)
MONOCYTES NFR BLD: 8.8 % (ref 4–15)
NEUTROPHILS # BLD AUTO: 4 K/UL (ref 1.8–7.7)
NEUTROPHILS # BLD AUTO: 4.5 K/UL (ref 1.8–7.7)
NEUTROPHILS NFR BLD: 67.4 % (ref 38–73)
NEUTROPHILS NFR BLD: 68.6 % (ref 38–73)
NRBC BLD-RTO: 0 /100 WBC
NRBC BLD-RTO: 0 /100 WBC
PLATELET # BLD AUTO: 137 K/UL (ref 150–450)
PLATELET # BLD AUTO: 137 K/UL (ref 150–450)
PMV BLD AUTO: 10.5 FL (ref 9.2–12.9)
PMV BLD AUTO: 10.5 FL (ref 9.2–12.9)
POTASSIUM SERPL-SCNC: 5.1 MMOL/L (ref 3.5–5.1)
PROT SERPL-MCNC: 7.1 G/DL (ref 6–8.4)
PROTHROMBIN TIME: 38.1 SEC (ref 9–12.5)
RBC # BLD AUTO: 2.49 M/UL (ref 4.6–6.2)
RBC # BLD AUTO: 2.58 M/UL (ref 4.6–6.2)
SODIUM SERPL-SCNC: 139 MMOL/L (ref 136–145)
TROPONIN I SERPL DL<=0.01 NG/ML-MCNC: 0.05 NG/ML (ref 0–0.03)
TROPONIN I SERPL DL<=0.01 NG/ML-MCNC: 0.05 NG/ML (ref 0–0.03)
TROPONIN I SERPL DL<=0.01 NG/ML-MCNC: 0.1 NG/ML (ref 0–0.03)
TROPONIN I SERPL DL<=0.01 NG/ML-MCNC: 0.41 NG/ML (ref 0–0.03)
WBC # BLD AUTO: 5.81 K/UL (ref 3.9–12.7)
WBC # BLD AUTO: 6.67 K/UL (ref 3.9–12.7)

## 2022-12-21 PROCEDURE — 36415 COLL VENOUS BLD VENIPUNCTURE: CPT | Mod: HCNC

## 2022-12-21 PROCEDURE — 93005 ELECTROCARDIOGRAM TRACING: CPT | Mod: HCNC

## 2022-12-21 PROCEDURE — 99220 PR INITIAL OBSERVATION CARE,LEVL III: ICD-10-PCS | Mod: HCNC,,,

## 2022-12-21 PROCEDURE — 80053 COMPREHEN METABOLIC PANEL: CPT | Mod: HCNC

## 2022-12-21 PROCEDURE — 93010 EKG 12-LEAD: ICD-10-PCS | Mod: HCNC,,, | Performed by: INTERNAL MEDICINE

## 2022-12-21 PROCEDURE — 30903 CONTROL OF NOSEBLEED: CPT | Mod: HCNC

## 2022-12-21 PROCEDURE — 84484 ASSAY OF TROPONIN QUANT: CPT | Mod: HCNC

## 2022-12-21 PROCEDURE — 30903 PR CTRL NOSEBLEED,ANTER,COMPLEX: ICD-10-PCS | Mod: LT,,, | Performed by: EMERGENCY MEDICINE

## 2022-12-21 PROCEDURE — 85025 COMPLETE CBC W/AUTO DIFF WBC: CPT | Mod: 91,HCNC

## 2022-12-21 PROCEDURE — 25000003 PHARM REV CODE 250: Mod: HCNC | Performed by: HOSPITALIST

## 2022-12-21 PROCEDURE — 99220 PR INITIAL OBSERVATION CARE,LEVL III: CPT | Mod: HCNC,,,

## 2022-12-21 PROCEDURE — 25000003 PHARM REV CODE 250: Mod: HCNC

## 2022-12-21 PROCEDURE — 99291 PR CRITICAL CARE, E/M 30-74 MINUTES: ICD-10-PCS | Mod: 25,HCNC,, | Performed by: EMERGENCY MEDICINE

## 2022-12-21 PROCEDURE — 99285 EMERGENCY DEPT VISIT HI MDM: CPT | Mod: 25,HCNC

## 2022-12-21 PROCEDURE — G0378 HOSPITAL OBSERVATION PER HR: HCPCS | Mod: HCNC

## 2022-12-21 PROCEDURE — 85610 PROTHROMBIN TIME: CPT | Mod: HCNC

## 2022-12-21 PROCEDURE — 84484 ASSAY OF TROPONIN QUANT: CPT | Mod: 91,HCNC

## 2022-12-21 PROCEDURE — 30903 CONTROL OF NOSEBLEED: CPT | Mod: LT,,, | Performed by: EMERGENCY MEDICINE

## 2022-12-21 PROCEDURE — 83880 ASSAY OF NATRIURETIC PEPTIDE: CPT | Mod: HCNC

## 2022-12-21 PROCEDURE — 99291 CRITICAL CARE FIRST HOUR: CPT | Mod: 25,HCNC,, | Performed by: EMERGENCY MEDICINE

## 2022-12-21 PROCEDURE — 94761 N-INVAS EAR/PLS OXIMETRY MLT: CPT | Mod: HCNC

## 2022-12-21 PROCEDURE — 93010 ELECTROCARDIOGRAM REPORT: CPT | Mod: HCNC,,, | Performed by: INTERNAL MEDICINE

## 2022-12-21 PROCEDURE — 85025 COMPLETE CBC W/AUTO DIFF WBC: CPT | Mod: HCNC

## 2022-12-21 RX ORDER — ONDANSETRON 4 MG/1
4 TABLET, ORALLY DISINTEGRATING ORAL
Status: COMPLETED | OUTPATIENT
Start: 2022-12-21 | End: 2022-12-21

## 2022-12-21 RX ORDER — CARVEDILOL 12.5 MG/1
12.5 TABLET ORAL 2 TIMES DAILY
Status: DISCONTINUED | OUTPATIENT
Start: 2022-12-21 | End: 2022-12-23

## 2022-12-21 RX ORDER — HYDRALAZINE HYDROCHLORIDE 25 MG/1
25 TABLET, FILM COATED ORAL EVERY 8 HOURS PRN
Status: DISCONTINUED | OUTPATIENT
Start: 2022-12-21 | End: 2022-12-28 | Stop reason: HOSPADM

## 2022-12-21 RX ORDER — ATORVASTATIN CALCIUM 40 MG/1
80 TABLET, FILM COATED ORAL NIGHTLY
Status: DISCONTINUED | OUTPATIENT
Start: 2022-12-21 | End: 2022-12-28 | Stop reason: HOSPADM

## 2022-12-21 RX ORDER — IBUPROFEN 200 MG
24 TABLET ORAL
Status: DISCONTINUED | OUTPATIENT
Start: 2022-12-21 | End: 2022-12-28 | Stop reason: HOSPADM

## 2022-12-21 RX ORDER — GLUCAGON 1 MG
1 KIT INJECTION
Status: DISCONTINUED | OUTPATIENT
Start: 2022-12-21 | End: 2022-12-28 | Stop reason: HOSPADM

## 2022-12-21 RX ORDER — OMEGA-3-ACID ETHYL ESTERS 1 G/1
2 CAPSULE, LIQUID FILLED ORAL 2 TIMES DAILY
Status: DISCONTINUED | OUTPATIENT
Start: 2022-12-21 | End: 2022-12-28 | Stop reason: HOSPADM

## 2022-12-21 RX ORDER — ALLOPURINOL 100 MG/1
100 TABLET ORAL DAILY
Status: DISCONTINUED | OUTPATIENT
Start: 2022-12-22 | End: 2022-12-28 | Stop reason: HOSPADM

## 2022-12-21 RX ORDER — LOPERAMIDE HYDROCHLORIDE 2 MG/1
2 CAPSULE ORAL 4 TIMES DAILY PRN
Status: DISCONTINUED | OUTPATIENT
Start: 2022-12-21 | End: 2022-12-28 | Stop reason: HOSPADM

## 2022-12-21 RX ORDER — LISINOPRIL 2.5 MG/1
2.5 TABLET ORAL DAILY
Status: DISCONTINUED | OUTPATIENT
Start: 2022-12-21 | End: 2022-12-21

## 2022-12-21 RX ORDER — ACETAMINOPHEN 325 MG/1
650 TABLET ORAL EVERY 6 HOURS PRN
Status: DISCONTINUED | OUTPATIENT
Start: 2022-12-21 | End: 2022-12-28 | Stop reason: HOSPADM

## 2022-12-21 RX ORDER — ALPRAZOLAM 0.25 MG/1
0.25 TABLET ORAL 2 TIMES DAILY PRN
Status: DISCONTINUED | OUTPATIENT
Start: 2022-12-21 | End: 2022-12-28 | Stop reason: HOSPADM

## 2022-12-21 RX ORDER — ONDANSETRON 4 MG/1
4 TABLET, FILM COATED ORAL EVERY 8 HOURS PRN
Status: DISCONTINUED | OUTPATIENT
Start: 2022-12-21 | End: 2022-12-28 | Stop reason: HOSPADM

## 2022-12-21 RX ORDER — NALOXONE HCL 0.4 MG/ML
0.02 VIAL (ML) INJECTION
Status: DISCONTINUED | OUTPATIENT
Start: 2022-12-21 | End: 2022-12-28 | Stop reason: HOSPADM

## 2022-12-21 RX ORDER — IBUPROFEN 200 MG
16 TABLET ORAL
Status: DISCONTINUED | OUTPATIENT
Start: 2022-12-21 | End: 2022-12-28 | Stop reason: HOSPADM

## 2022-12-21 RX ORDER — PROCHLORPERAZINE EDISYLATE 5 MG/ML
5 INJECTION INTRAMUSCULAR; INTRAVENOUS EVERY 6 HOURS PRN
Status: DISCONTINUED | OUTPATIENT
Start: 2022-12-21 | End: 2022-12-28 | Stop reason: HOSPADM

## 2022-12-21 RX ORDER — OXYMETAZOLINE HCL 0.05 %
2 SPRAY, NON-AEROSOL (ML) NASAL
Status: DISPENSED | OUTPATIENT
Start: 2022-12-21 | End: 2022-12-24

## 2022-12-21 RX ORDER — IPRATROPIUM BROMIDE AND ALBUTEROL SULFATE 2.5; .5 MG/3ML; MG/3ML
3 SOLUTION RESPIRATORY (INHALATION) EVERY 4 HOURS PRN
Status: DISCONTINUED | OUTPATIENT
Start: 2022-12-21 | End: 2022-12-28 | Stop reason: HOSPADM

## 2022-12-21 RX ORDER — PREDNISOLONE ACETATE-GATIFLOXACIN-BROMFENAC .75; 5; 1 MG/ML; MG/ML; MG/ML
1 SUSPENSION/ DROPS OPHTHALMIC 3 TIMES DAILY
Status: DISCONTINUED | OUTPATIENT
Start: 2022-12-21 | End: 2022-12-28 | Stop reason: HOSPADM

## 2022-12-21 RX ORDER — FERROUS GLUCONATE 324(37.5)
324 TABLET ORAL
Status: DISCONTINUED | OUTPATIENT
Start: 2022-12-22 | End: 2022-12-28 | Stop reason: HOSPADM

## 2022-12-21 RX ORDER — MAG HYDROX/ALUMINUM HYD/SIMETH 200-200-20
30 SUSPENSION, ORAL (FINAL DOSE FORM) ORAL 4 TIMES DAILY PRN
Status: DISCONTINUED | OUTPATIENT
Start: 2022-12-21 | End: 2022-12-28 | Stop reason: HOSPADM

## 2022-12-21 RX ADMIN — ALPRAZOLAM 0.25 MG: 0.25 TABLET ORAL at 08:12

## 2022-12-21 RX ADMIN — PHENYLEPHRINE HYDROCHLORIDE 2 SPRAY: 0.5 SPRAY NASAL at 10:12

## 2022-12-21 RX ADMIN — ATORVASTATIN CALCIUM 80 MG: 40 TABLET, FILM COATED ORAL at 08:12

## 2022-12-21 RX ADMIN — ONDANSETRON 4 MG: 4 TABLET, ORALLY DISINTEGRATING ORAL at 12:12

## 2022-12-21 RX ADMIN — OMEGA-3-ACID ETHYL ESTERS 2 G: 1 CAPSULE, LIQUID FILLED ORAL at 08:12

## 2022-12-21 RX ADMIN — LISINOPRIL 2.5 MG: 2.5 TABLET ORAL at 02:12

## 2022-12-21 RX ADMIN — CARVEDILOL 12.5 MG: 12.5 TABLET, FILM COATED ORAL at 04:12

## 2022-12-21 NOTE — ASSESSMENT & PLAN NOTE
The patient is an 82 year old male with recurrent epistaxis s/p multiple cauterizations in the office, now with recurrent bleeding. Rapid rhino was removed and replaced with merocel and doused with Afrin. No further bleeding at this time.     - Recommend continued Afrin q4h x24 hours  - Will plan to remove merocel in 24-48 hrs  - If bleeding occurs, spray Afrin in nose and hold pressure for 20 minutes straight. Call ENT if bleeding persists despite this.

## 2022-12-21 NOTE — HPI
The patient is an 82 year old male who presents to the ED with continued epistaxis since 10:30 pm 12/20/22. The ED placed a rapid rhino but the patient reports continued bleeding, so ENT was consulted. The patient reports a history of recurrent epistaxis and was cauterized in the office with Dr. Patel 11/30/22 with electrocautery to left Little's area. He denies any aggravating factors to the epistaxis.       12/25/22: ENT reconsulted as pt had epistaxis this AM. Patient endorses lifelong hx of recurrent epistaxis. This morning notes that the packing placed earlier this hospitalization he believes fell out entirely shortly after it was placed though bleeding was minor until this AM when it began steadily dripping from primarily the left side.

## 2022-12-21 NOTE — ASSESSMENT & PLAN NOTE
Recurrent Epistaxis   Thrombocytopenia  Long term AC   Supra therapeutic INR     82 y.o. male with CAD s/p CABG, PCI (11/2021), mechanical aortic valve (2014), HFmEF, CVA, CKD III, HTN, who presented with epistaxis.  Reports having had worsening shortness of breath with exertion over the last several weeks, orthopnea and lower extremity edema. BNP 2,348, CXR with faint reticular opacities present bilaterally  Likely secondary to holding bumex  - continue Lasix 80 IV BID  - Strict I/Os, daily weights, Low Na diet     Acute anterior epistaxis  ENT consulted, appreciate assistance  - Fibrillar placed in L nare, dissolvable packing no need for removal or abx while in place   - Nasal saline to bilateral nares q 4 hrs   - Afrin nasal spray PRN epistaxis  - Avoid nasal cannula; recommend humidified 02   - Patient recommendations: keep head of bed elevated, no nose blowing, sneeze through an open mouth, avoid straining, treat HTN  - Counseled on management of acute epistaxis if occurs again while at home - use afrin and hold pressure for 15 minutes

## 2022-12-21 NOTE — ASSESSMENT & PLAN NOTE
Does not appear to be in acute HF  -  (lower than baseline)  - CXR clear and does not appear to be volume overloaded on exam   - likely volume down given acute bleed and JIM   - holding bumex    Results for orders placed during the hospital encounter of 10/05/22    Echo    Interpretation Summary  · The left ventricle is mildly enlarged with eccentric hypertrophy and mildly decreased systolic function. The estimated ejection fraction is 45%.  · There is abnormal septal wall motion consistent with post-operative status.  · Moderate right ventricular enlargement with normal right ventricular systolic function.  · Left ventricular diastolic dysfunction.  · Biatrial enlargement.  · There is a mechanical aortic valve present. There is no aortic insufficiency present. Prosthetic aortic valve is normal.  · The aortic valve mean gradient is 13 mmHg with a dimensionless index of 0.36.  · Mild-to-moderate mitral regurgitation.  · Mild to moderate tricuspid regurgitation.  · The estimated PA systolic pressure is 42 mmHg.  · Normal central venous pressure (3 mmHg).

## 2022-12-21 NOTE — ED TRIAGE NOTES
Patient presents to the ED today with reports of Epistaxis and chest pain. Patient states Epistaxis started last night. Patient endorses taking coumadin but states has not taken since Sunday. Patient states bleeding worsening this morning. Patient also endorses chest pain while walking described as pressure rates 10/10. Patient states pain subsides upon resting. Patient endorses SOB denies nausea vomiting or abdominal pain

## 2022-12-21 NOTE — PROGRESS NOTES
12/21/22  INR is due today, Wife called to report the Patient has been having L-nostril bleed through out the night into today and is at ED now, labs were drawn, INR is in process

## 2022-12-21 NOTE — H&P
[facility] - Emergency Dept  [facility] Medicine  History & Physical    Patient Name: Dariel Urbina  MRN: [id number]  Patient Class: OP- Observation  Admission Date: 12/21/2022  Attending Physician: Aurelia Perry MD   Primary Care Provider: Devon Langston MD         Patient information was obtained from patient, spouse/SO and ER records.     Subjective:     Principal Problem:Acute blood loss anemia    Chief Complaint:   Chief Complaint   Patient presents with    Epistaxis     Since lastnight and c/p. Denies n/v.         HPI: Dariel Urbina is a 82 y.o. male with past medical history of CAD s/p CABG, PCI (11/2021), mechanical aortic valve (2014) on coumadin, HFmEF, CVA, CKD III, HTN, who presented to the ED with chest pain and epistaxis. Patient reports long-standing history of episodes of epistaxis and has had several cauterizations with ENT. Last admission for this issue was in October. Found to be supratherapeutic at warfarin clinic and was told to hold, so he has not taken it for the last 2 days.  This episode the bleeding began last night and has persisted this morning. He also c/o chronic recurrent exertional chest pain that is worse than his baseline. He says it is a central chest pressure that feels like indigestion. He endorses some lightheadedness and fatigue, as well as SOB which he says is baseline. He denies diaphoresis or nausea/ vomiting, fevers or chills, abdominal pain, N/V/D, blood in stool, blood in urine.    Patient hypertensive to 198/81 max on admit, otherwise VSS. AF. Hgb 7.8. . PT 38/ INR 4.0. Creatinine 2.5 (baseline ~1.9, though variable). . Trop 0.047 -> 0.052. EKG in NSR with 1st degree AV block. CXR stable. Rhinorocket placed in ED, though bleeding not controlled at time of my exam.         Past Medical History:   Diagnosis Date    Anemia of chronic renal failure, stage 3 (moderate) 05/27/2015    Anticoagulant long-term use     Atherosclerosis of coronary artery  bypass graft of native heart without angina pectoris 09/11/2012    3-27-18 [facility] Two vessel coronary artery disease.   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    Bilateral carotid artery disease 02/09/2017    Bleeding from the nose     Bleeding nose 03/21/2018    Cataract     CKD (chronic kidney disease) stage 3, GFR 30-59 ml/min 05/27/2015    Claudication of left lower extremity 09/17/2014    Colon polyp     Encounter for blood transfusion     Gastroesophageal reflux disease without esophagitis 03/19/2018    Gastrointestinal hemorrhage associated with intestinal diverticulosis 04/01/2018    Glaucoma     H/O mechanical aortic valve replacement 09/17/2014    History of gout 09/26/2012    Hyperparathyroidism due to renal insufficiency 07/27/2015    Internal hemorrhoid 04/03/2018    Long term current use of anticoagulant therapy 09/26/2012    Mechanical heart valve present     Metabolic acidosis with normal anion gap and bicarbonate losses 03/20/2018    Mixed hyperlipidemia 09/26/2012    NSTEMI (non-ST elevated myocardial infarction) 03/21/2018    Obesity, diabetes, and hypertension syndrome 02/23/2016    PVD (peripheral vascular disease) 09/11/2012    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    Syncope 09/29/2022    Type 2 diabetes mellitus with diabetic peripheral angiopathy without gangrene 05/27/2015    Type 2 diabetes mellitus with stage 3 chronic kidney disease, without long-term current use of insulin 10/02/2013       Past Surgical History:   Procedure Laterality Date    CARDIAC CATHETERIZATION      CARDIAC VALVE REPLACEMENT      CARDIAC VALVE SURGERY      CARPAL TUNNEL RELEASE Right 05/19/2020    Procedure: RELEASE, CARPAL TUNNEL;  Surgeon: Rupesh Norris Jr., MD;  Location: [facility];  Service: Plastics;  Laterality: Right;    CATARACT EXTRACTION Left 11/13/2022        COLON SURGERY      COLONOSCOPY N/A 03/31/2017    Procedure: COLONOSCOPY;  Surgeon: Bruno Rayomnd MD;   Location: [facility] ENDO (4TH FLR);  Service: Endoscopy;  Laterality: N/A;  Patient's wife requesting date.    COLONOSCOPY N/A 04/03/2018    Procedure: COLONOSCOPY;  Surgeon: Bonifacio Pelletier MD;  Location: [facility] ENDO (2ND FLR);  Service: Endoscopy;  Laterality: N/A;    COLONOSCOPY N/A 08/13/2018    Procedure: COLONOSCOPY;  Surgeon: Kam Barba MD;  Location: [facility] ENDO (2ND FLR);  Service: Endoscopy;  Laterality: N/A;  2nd floor: PA pressure 49; hx of moderate-severe valve disease     per Coumadin clinic-Patient can hold 5 days with lovenox bridge       ok to schedule per Katarina    CORONARY ANGIOGRAPHY N/A 10/04/2021    Procedure: Left heart cath +/- peripheral angiogram;  Surgeon: Jose Ruiz MD;  Location: [facility] CATH LAB;  Service: Cardiology;  Laterality: N/A;    CORONARY ANGIOPLASTY      CORONARY ARTERY BYPASS GRAFT      CORONARY BYPASS GRAFT ANGIOGRAPHY  10/04/2021    Procedure: Bypass graft study;  Surgeon: Jose Ruiz MD;  Location: [facility] CATH LAB;  Service: Cardiology;;    HERNIA REPAIR      INTRAOCULAR PROSTHESES INSERTION Left 11/13/2022    Procedure: INSERTION, IOL PROSTHESIS;  Surgeon: Alia Mckeon MD;  Location: [address];  Service: Ophthalmology;  Laterality: Left;    INTRAOCULAR PROSTHESES INSERTION Right 12/4/2022    Procedure: INSERTION, IOL PROSTHESIS;  Surgeon: Alia Mckeon MD;  Location: [address];  Service: Ophthalmology;  Laterality: Right;    PHACOEMULSIFICATION OF CATARACT Left 11/13/2022    Procedure: PHACOEMULSIFICATION, CATARACT;  Surgeon: Alia Mckeon MD;  Location: [address];  Service: Ophthalmology;  Laterality: Left;    PHACOEMULSIFICATION OF CATARACT Right 12/4/2022    Procedure: PHACOEMULSIFICATION, CATARACT;  Surgeon: Alia Mckeon MD;  Location: [address];  Service: Ophthalmology;  Laterality: Right;    SPINE SURGERY      VASECTOMY         Review of patient's allergies indicates:   Allergen Reactions    Fosinopril      Intolerance-  elevates potassium level      Losartan      Intolerance- elevates potassium level       Current Facility-Administered Medications on File Prior to Encounter   Medication    ondansetron injection 4 mg    ondansetron injection 4 mg    phenylephrine HCL 2.5% ophthalmic solution 1 drop    phenylephrine HCL 2.5% ophthalmic solution 1 drop    proparacaine 0.5 % ophthalmic solution 1 drop    proparacaine 0.5 % ophthalmic solution 1 drop    tropicamide 1% ophthalmic solution 1 drop    tropicamide 1% ophthalmic solution 1 drop     Current Outpatient Medications on File Prior to Encounter   Medication Sig    allopurinoL (ZYLOPRIM) 100 MG tablet TAKE 1 TABLET EVERY DAY    bumetanide (BUMEX) 1 MG tablet Take 1 tablet (1 mg total) by mouth once daily.    carvediloL (COREG) 12.5 MG tablet Take 1 tablet (12.5 mg total) by mouth 2 (two) times daily with meals.    ferrous gluconate (FERGON) 324 MG tablet Take 1 tablet (324 mg total) by mouth daily with breakfast.    isosorbide mononitrate (IMDUR) 120 MG 24 hr tablet Take 1 tablet (120 mg total) by mouth once daily.    lactose-reduced food (ENSURE ORAL) Take 1-2 cans by mouth once daily    lisinopriL (PRINIVIL,ZESTRIL) 2.5 MG tablet Take 1 tablet (2.5 mg total) by mouth once daily.    loperamide (IMODIUM) 2 mg capsule Take 2 mg by mouth 4 (four) times daily as needed for Diarrhea.    omega-3 acid ethyl esters (LOVAZA) 1 gram capsule Take 2 capsules (2 g total) by mouth 2 (two) times daily.    prednisolon/gatiflox/bromfenac (PREDNISOL ACE-GATIFLOX-BROMFEN) 1-0.5-0.075 % DrpS Apply 1 drop to eye 3 (three) times daily. One drop 3 times a day in surgical eye    prednisolon/gatiflox/bromfenac (PREDNISOL ACE-GATIFLOX-BROMFEN) 1-0.5-0.075 % DrpS Apply 1 drop to eye 3 (three) times daily. One drop 3 times a day in surgical eye    rosuvastatin (CRESTOR) 40 MG Tab TAKE 1 TABLET EVERY EVENING.    warfarin (COUMADIN) 5 MG tablet Take 1 tablet (5 mg) by mouth Monday, Tuesday, Wednesday, Friday &  Saturday then 1.5 tablets (7.5mg) by mouth all other days (Thurs & Sun) as instructed by Coumadin Clinic. (Patient taking differently: Take 1 tablet (5 mg) by mouth Monday&Friday then 1.5 tablets (7.5mg) by mouth all other days as instructed by Coumadin Clinic.)     Family History       Problem Relation (Age of Onset)    Alcohol abuse Father    Diabetes Brother    Heart disease Mother, Father, Brother, Sister    Heart failure Mother, Father, Brother    No Known Problems Sister, Maternal Grandmother, Maternal Grandfather, Paternal Grandmother, Paternal Grandfather, Maternal Aunt, Maternal Uncle, Paternal Aunt, Paternal Uncle          Tobacco Use    Smoking status: Former     Packs/day: 1.00     Years: 20.00     Pack years: 20.00     Types: Cigarettes     Quit date: 1980     Years since quittin.3     Passive exposure: Never    Smokeless tobacco: Never   Substance and Sexual Activity    Alcohol use: No    Drug use: No    Sexual activity: Not Currently     Review of Systems   Constitutional:  Positive for fatigue. Negative for activity change, chills, diaphoresis and fever.   HENT:  Positive for nosebleeds. Negative for trouble swallowing.    Eyes:  Negative for photophobia and visual disturbance.   Respiratory:  Positive for shortness of breath. Negative for chest tightness and wheezing.    Cardiovascular:  Positive for chest pain. Negative for palpitations and leg swelling.   Gastrointestinal:  Negative for abdominal pain, constipation, diarrhea, nausea and vomiting.   Genitourinary:  Negative for dysuria, frequency, hematuria and urgency.   Musculoskeletal:  Negative for arthralgias, back pain and gait problem.   Skin:  Negative for color change and rash.   Neurological:  Positive for light-headedness. Negative for dizziness, syncope, weakness, numbness and headaches.   Psychiatric/Behavioral:  Negative for agitation and confusion. The patient is not nervous/anxious.    Objective:     Vital Signs (Most  Recent):  Temp: 97.4 °F (36.3 °C) (12/21/22 0943)  Pulse: 80 (12/21/22 1602)  Resp: (!) 22 (12/21/22 1602)  BP: (!) 173/82 (12/21/22 1602)  SpO2: 97 % (12/21/22 1602)   Vital Signs (24h Range):  Temp:  [97.4 °F (36.3 °C)] 97.4 °F (36.3 °C)  Pulse:  [62-80] 80  Resp:  [16-22] 22  SpO2:  [97 %-100 %] 97 %  BP: (137-198)/(64-82) 173/82     Weight: 78 kg (171 lb 15.3 oz)  Body mass index is 28.62 kg/m².    Physical Exam  Vitals and nursing note reviewed.   Constitutional:       General: He is not in acute distress.     Appearance: He is well-developed. He is obese.   HENT:      Head: Normocephalic and atraumatic.      Nose:      Comments: Rhinorocket in L nare, active bleeding     Mouth/Throat:      Pharynx: No oropharyngeal exudate.   Eyes:      Conjunctiva/sclera: Conjunctivae normal.      Pupils: Pupils are equal, round, and reactive to light.   Cardiovascular:      Rate and Rhythm: Normal rate and regular rhythm.      Heart sounds: Murmur heard.      Comments: No JVD  Pulmonary:      Effort: Pulmonary effort is normal. No respiratory distress.      Breath sounds: Normal breath sounds. No wheezing.      Comments: Crackles in RLL  Abdominal:      General: Bowel sounds are normal. There is no distension.      Palpations: Abdomen is soft.      Tenderness: There is no abdominal tenderness.   Musculoskeletal:         General: No tenderness. Normal range of motion.      Cervical back: Normal range of motion and neck supple.      Right lower leg: No edema.      Left lower leg: No edema.   Lymphadenopathy:      Cervical: No cervical adenopathy.   Skin:     General: Skin is warm and dry.      Capillary Refill: Capillary refill takes less than 2 seconds.      Coloration: Skin is not pale.      Findings: No rash.   Neurological:      Mental Status: He is alert and oriented to person, place, and time.      Cranial Nerves: No cranial nerve deficit.      Sensory: No sensory deficit.      Coordination: Coordination normal.    Psychiatric:         Behavior: Behavior normal.         Thought Content: Thought content normal.         Judgment: Judgment normal.         CRANIAL NERVES     CN III, IV, VI   Pupils are equal, round, and reactive to light.     Significant Labs: All pertinent labs within the past 24 hours have been reviewed.  CBC:   Recent Labs   Lab 12/21/22  1034   WBC 5.81   HGB 7.8*   HCT 25.3*   *     CMP:   Recent Labs   Lab 12/21/22  1034      K 5.1      CO2 20*   GLU 99   BUN 62*   CREATININE 2.5*   CALCIUM 9.6   PROT 7.1   ALBUMIN 3.6   BILITOT 0.6   ALKPHOS 55   AST 16   ALT 15   ANIONGAP 10       Significant Imaging: I have reviewed all pertinent imaging results/findings within the past 24 hours.    Imaging Results              X-Ray Chest AP Portable (Final result)  Result time 12/21/22 13:42:26      Final result by Kam Dash MD (12/21/22 13:42:26)                   Impression:      See above      Electronically signed by: Kam Dash MD  Date:    12/21/2022  Time:    13:42               Narrative:    EXAMINATION:  XR CHEST AP PORTABLE    CLINICAL HISTORY:  chest pain;    TECHNIQUE:  Single frontal view of the chest was performed.    COMPARISON:  Non 10/23/2022 e    FINDINGS:  Postoperative changes as before.  Mild cardiomegaly.  The lungs are clear.  No pleural effusion                                        Assessment/Plan:     * Acute blood loss anemia  Recurrent Epistaxis   Thrombocytopenia  Long term AC   Supra therapeutic INR    - Bleeding persistent despite rhinorocket by ED   - Afrin prn   - consider abx ppx while packing in place  - ENT consulted, appreciate assistance   - close monitoring of BP while treating hypertension   - holding bumex for now   - holding warfarin for INR 4 (6.6 2 days ago). PharmD consulted for assistance.     Chest pain  Chest pain in setting of acute bleeding and Hypertension. Exertional CP is chronic though pt reports this episode was worse than  baseline.  -trop 0.047 -> 0.052, trending q4  -BNP elevated, but below baseline. No acute HF.   -ECG in NSR with 1st degree AV block unchanged from baseline  -CXR without acute change   -TTE 10/22 reviewed  -tele monitoring  -K>4, Mg>2  - continue coreg, imdur       Acute on chronic heart failure  Does not appear to be in acute HF  -  (lower than baseline)  - CXR clear and does not appear to be volume overloaded on exam   - likely volume down given acute bleed and JIM   - holding bumex    Results for orders placed during the hospital encounter of 10/05/22    Echo    Interpretation Summary  · The left ventricle is mildly enlarged with eccentric hypertrophy and mildly decreased systolic function. The estimated ejection fraction is 45%.  · There is abnormal septal wall motion consistent with post-operative status.  · Moderate right ventricular enlargement with normal right ventricular systolic function.  · Left ventricular diastolic dysfunction.  · Biatrial enlargement.  · There is a mechanical aortic valve present. There is no aortic insufficiency present. Prosthetic aortic valve is normal.  · The aortic valve mean gradient is 13 mmHg with a dimensionless index of 0.36.  · Mild-to-moderate mitral regurgitation.  · Mild to moderate tricuspid regurgitation.  · The estimated PA systolic pressure is 42 mmHg.  · Normal central venous pressure (3 mmHg).        Stage 3b chronic kidney disease  JIM  BMP reviewed- noted Estimated Creatinine Clearance: 22.3 mL/min (A) (based on SCr of 2.5 mg/dL (H)). according to latest data. Monitor UOP and serial BMP and adjust therapy as needed. Renally dose meds.  Baseline ~1.9.   Likely pre-renal from dehydration and volume losses in setting of persistent epistaxis  If no improvement with conservative measures overnight can further work up  Consider urine lytes and renal ultrasound  Strict I&Os  Gentle IVF  Avoid Nephrotoxic agents      H/O mechanical aortic valve replacement  Long  term AC    Holding AC for acute epistaxis and INR 4      History of gout  - continue allopurinol      Renovascular hypertension  - hypertensive on admit despite volume losses   - will continue coreg with caution and close monitoring; prn hydralazine PO  - hold lasix and lisinopril for JIM  - bleeding control as above       Hyperlipidemia associated with type 2 diabetes mellitus  - continue statin    Coronary artery disease of bypass graft of native heart with stable angina pectoris  Carotid disease     - continue statin   - see CP        VTE Risk Mitigation (From admission, onward)      None               BELTRAN LeachC  Department of Hospital Medicine   [facility] - Emergency Dept

## 2022-12-21 NOTE — SUBJECTIVE & OBJECTIVE
Past Medical History:   Diagnosis Date    Anemia of chronic renal failure, stage 3 (moderate) 05/27/2015    Anticoagulant long-term use     Atherosclerosis of coronary artery bypass graft of native heart without angina pectoris 09/11/2012    3-27-18 [facility] Two vessel coronary artery disease.   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    Bilateral carotid artery disease 02/09/2017    Bleeding from the nose     Bleeding nose 03/21/2018    Cataract     CKD (chronic kidney disease) stage 3, GFR 30-59 ml/min 05/27/2015    Claudication of left lower extremity 09/17/2014    Colon polyp     Encounter for blood transfusion     Gastroesophageal reflux disease without esophagitis 03/19/2018    Gastrointestinal hemorrhage associated with intestinal diverticulosis 04/01/2018    Glaucoma     H/O mechanical aortic valve replacement 09/17/2014    History of gout 09/26/2012    Hyperparathyroidism due to renal insufficiency 07/27/2015    Internal hemorrhoid 04/03/2018    Long term current use of anticoagulant therapy 09/26/2012    Mechanical heart valve present     Metabolic acidosis with normal anion gap and bicarbonate losses 03/20/2018    Mixed hyperlipidemia 09/26/2012    NSTEMI (non-ST elevated myocardial infarction) 03/21/2018    Obesity, diabetes, and hypertension syndrome 02/23/2016    PVD (peripheral vascular disease) 09/11/2012    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    Syncope 09/29/2022    Type 2 diabetes mellitus with diabetic peripheral angiopathy without gangrene 05/27/2015    Type 2 diabetes mellitus with stage 3 chronic kidney disease, without long-term current use of insulin 10/02/2013       Past Surgical History:   Procedure Laterality Date    CARDIAC CATHETERIZATION      CARDIAC VALVE REPLACEMENT      CARDIAC VALVE SURGERY      CARPAL TUNNEL RELEASE Right 05/19/2020    Procedure: RELEASE, CARPAL TUNNEL;  Surgeon: Rupesh Norris Jr., MD;  Location: [facility];  Service: Plastics;  Laterality:  Right;    CATARACT EXTRACTION Left 11/13/2022        COLON SURGERY      COLONOSCOPY N/A 03/31/2017    Procedure: COLONOSCOPY;  Surgeon: Bruno Raymond MD;  Location: [facility] (4TH FLR);  Service: Endoscopy;  Laterality: N/A;  Patient's wife requesting date.    COLONOSCOPY N/A 04/03/2018    Procedure: COLONOSCOPY;  Surgeon: Bonifacio Pelletier MD;  Location: [facility] ENDO (2ND FLR);  Service: Endoscopy;  Laterality: N/A;    COLONOSCOPY N/A 08/13/2018    Procedure: COLONOSCOPY;  Surgeon: Kam Barba MD;  Location: [facility] ENDO (2ND FLR);  Service: Endoscopy;  Laterality: N/A;  2nd floor: PA pressure 49; hx of moderate-severe valve disease     per Coumadin clinic-Patient can hold 5 days with lovenox bridge       ok to schedule per Katarina    CORONARY ANGIOGRAPHY N/A 10/04/2021    Procedure: Left heart cath +/- peripheral angiogram;  Surgeon: Jose Ruiz MD;  Location: [facility] CATH LAB;  Service: Cardiology;  Laterality: N/A;    CORONARY ANGIOPLASTY      CORONARY ARTERY BYPASS GRAFT      CORONARY BYPASS GRAFT ANGIOGRAPHY  10/04/2021    Procedure: Bypass graft study;  Surgeon: Jose Ruiz MD;  Location: [facility] CATH LAB;  Service: Cardiology;;    HERNIA REPAIR      INTRAOCULAR PROSTHESES INSERTION Left 11/13/2022    Procedure: INSERTION, IOL PROSTHESIS;  Surgeon: Alia Mckeon MD;  Location: [facility] OR [address] [place] [address];  Service: Ophthalmology;  Laterality: Left;    INTRAOCULAR PROSTHESES INSERTION Right 12/4/2022    Procedure: INSERTION, IOL PROSTHESIS;  Surgeon: Alia Mckeon MD;  Location: [address];  Service: Ophthalmology;  Laterality: Right;    PHACOEMULSIFICATION OF CATARACT Left 11/13/2022    Procedure: PHACOEMULSIFICATION, CATARACT;  Surgeon: Alia Mckeon MD;  Location: [facility] OR [address] [place] [address];  Service: Ophthalmology;  Laterality: Left;    PHACOEMULSIFICATION OF CATARACT Right 12/4/2022    Procedure: PHACOEMULSIFICATION, CATARACT;  Surgeon: Alia Mckeon MD;  Location: [facility] OR [address] [place] [address];  Service:  Ophthalmology;  Laterality: Right;    SPINE SURGERY      VASECTOMY         Review of patient's allergies indicates:   Allergen Reactions    Fosinopril      Intolerance- elevates potassium level      Losartan      Intolerance- elevates potassium level       Current Facility-Administered Medications on File Prior to Encounter   Medication    ondansetron injection 4 mg    ondansetron injection 4 mg    phenylephrine HCL 2.5% ophthalmic solution 1 drop    phenylephrine HCL 2.5% ophthalmic solution 1 drop    proparacaine 0.5 % ophthalmic solution 1 drop    proparacaine 0.5 % ophthalmic solution 1 drop    tropicamide 1% ophthalmic solution 1 drop    tropicamide 1% ophthalmic solution 1 drop     Current Outpatient Medications on File Prior to Encounter   Medication Sig    allopurinoL (ZYLOPRIM) 100 MG tablet TAKE 1 TABLET EVERY DAY    bumetanide (BUMEX) 1 MG tablet Take 1 tablet (1 mg total) by mouth once daily.    carvediloL (COREG) 12.5 MG tablet Take 1 tablet (12.5 mg total) by mouth 2 (two) times daily with meals.    ferrous gluconate (FERGON) 324 MG tablet Take 1 tablet (324 mg total) by mouth daily with breakfast.    isosorbide mononitrate (IMDUR) 120 MG 24 hr tablet Take 1 tablet (120 mg total) by mouth once daily.    lactose-reduced food (ENSURE ORAL) Take 1-2 cans by mouth once daily    lisinopriL (PRINIVIL,ZESTRIL) 2.5 MG tablet Take 1 tablet (2.5 mg total) by mouth once daily.    loperamide (IMODIUM) 2 mg capsule Take 2 mg by mouth 4 (four) times daily as needed for Diarrhea.    omega-3 acid ethyl esters (LOVAZA) 1 gram capsule Take 2 capsules (2 g total) by mouth 2 (two) times daily.    prednisolon/gatiflox/bromfenac (PREDNISOL ACE-GATIFLOX-BROMFEN) 1-0.5-0.075 % DrpS Apply 1 drop to eye 3 (three) times daily. One drop 3 times a day in surgical eye    prednisolon/gatiflox/bromfenac (PREDNISOL ACE-GATIFLOX-BROMFEN) 1-0.5-0.075 % DrpS Apply 1 drop to eye 3 (three) times daily. One drop 3 times a day in surgical  eye    rosuvastatin (CRESTOR) 40 MG Tab TAKE 1 TABLET EVERY EVENING.    warfarin (COUMADIN) 5 MG tablet Take 1 tablet (5 mg) by mouth Monday, Tuesday, Wednesday, Friday & Saturday then 1.5 tablets (7.5mg) by mouth all other days (Thurs & Sun) as instructed by [facility] Clinic. (Patient taking differently: Take 1 tablet (5 mg) by mouth Monday&Friday then 1.5 tablets (7.5mg) by mouth all other days as instructed by [facility]madin Clinic.)     Family History       Problem Relation (Age of Onset)    Alcohol abuse Father    Diabetes Brother    Heart disease Mother, Father, Brother, Sister    Heart failure Mother, Father, Brother    No Known Problems Sister, Maternal Grandmother, Maternal Grandfather, Paternal Grandmother, Paternal Grandfather, Maternal Aunt, Maternal Uncle, Paternal Aunt, Paternal Uncle          Tobacco Use    Smoking status: Former     Packs/day: 1.00     Years: 20.00     Pack years: 20.00     Types: Cigarettes     Quit date: 1980     Years since quittin.3     Passive exposure: Never    Smokeless tobacco: Never   Substance and Sexual Activity    Alcohol use: No    Drug use: No    Sexual activity: Not Currently     Review of Systems   Constitutional:  Positive for fatigue. Negative for activity change, chills, diaphoresis and fever.   HENT:  Positive for nosebleeds. Negative for trouble swallowing.    Eyes:  Negative for photophobia and visual disturbance.   Respiratory:  Positive for shortness of breath. Negative for chest tightness and wheezing.    Cardiovascular:  Positive for chest pain. Negative for palpitations and leg swelling.   Gastrointestinal:  Negative for abdominal pain, constipation, diarrhea, nausea and vomiting.   Genitourinary:  Negative for dysuria, frequency, hematuria and urgency.   Musculoskeletal:  Negative for arthralgias, back pain and gait problem.   Skin:  Negative for color change and rash.   Neurological:  Positive for light-headedness. Negative for dizziness, syncope,  weakness, numbness and headaches.   Psychiatric/Behavioral:  Negative for agitation and confusion. The patient is not nervous/anxious.    Objective:     Vital Signs (Most Recent):  Temp: 97.4 °F (36.3 °C) (12/21/22 0943)  Pulse: 80 (12/21/22 1602)  Resp: (!) 22 (12/21/22 1602)  BP: (!) 173/82 (12/21/22 1602)  SpO2: 97 % (12/21/22 1602)   Vital Signs (24h Range):  Temp:  [97.4 °F (36.3 °C)] 97.4 °F (36.3 °C)  Pulse:  [62-80] 80  Resp:  [16-22] 22  SpO2:  [97 %-100 %] 97 %  BP: (137-198)/(64-82) 173/82     Weight: 78 kg (171 lb 15.3 oz)  Body mass index is 28.62 kg/m².    Physical Exam  Vitals and nursing note reviewed.   Constitutional:       General: He is not in acute distress.     Appearance: He is well-developed. He is obese.   HENT:      Head: Normocephalic and atraumatic.      Nose:      Comments: Rhinorocket in L nare, active bleeding     Mouth/Throat:      Pharynx: No oropharyngeal exudate.   Eyes:      Conjunctiva/sclera: Conjunctivae normal.      Pupils: Pupils are equal, round, and reactive to light.   Cardiovascular:      Rate and Rhythm: Normal rate and regular rhythm.      Heart sounds: Murmur heard.      Comments: No JVD  Pulmonary:      Effort: Pulmonary effort is normal. No respiratory distress.      Breath sounds: Normal breath sounds. No wheezing.      Comments: Crackles in RLL  Abdominal:      General: Bowel sounds are normal. There is no distension.      Palpations: Abdomen is soft.      Tenderness: There is no abdominal tenderness.   Musculoskeletal:         General: No tenderness. Normal range of motion.      Cervical back: Normal range of motion and neck supple.      Right lower leg: No edema.      Left lower leg: No edema.   Lymphadenopathy:      Cervical: No cervical adenopathy.   Skin:     General: Skin is warm and dry.      Capillary Refill: Capillary refill takes less than 2 seconds.      Coloration: Skin is not pale.      Findings: No rash.   Neurological:      Mental Status: He is alert  and oriented to person, place, and time.      Cranial Nerves: No cranial nerve deficit.      Sensory: No sensory deficit.      Coordination: Coordination normal.   Psychiatric:         Behavior: Behavior normal.         Thought Content: Thought content normal.         Judgment: Judgment normal.         CRANIAL NERVES     CN III, IV, VI   Pupils are equal, round, and reactive to light.     Significant Labs: All pertinent labs within the past 24 hours have been reviewed.  CBC:   Recent Labs   Lab 12/21/22  1034   WBC 5.81   HGB 7.8*   HCT 25.3*   *     CMP:   Recent Labs   Lab 12/21/22  1034      K 5.1      CO2 20*   GLU 99   BUN 62*   CREATININE 2.5*   CALCIUM 9.6   PROT 7.1   ALBUMIN 3.6   BILITOT 0.6   ALKPHOS 55   AST 16   ALT 15   ANIONGAP 10       Significant Imaging: I have reviewed all pertinent imaging results/findings within the past 24 hours.    Imaging Results              X-Ray Chest AP Portable (Final result)  Result time 12/21/22 13:42:26      Final result by Kam Dash MD (12/21/22 13:42:26)                   Impression:      See above      Electronically signed by: Kam Dash MD  Date:    12/21/2022  Time:    13:42               Narrative:    EXAMINATION:  XR CHEST AP PORTABLE    CLINICAL HISTORY:  chest pain;    TECHNIQUE:  Single frontal view of the chest was performed.    COMPARISON:  Non 10/23/2022 e    FINDINGS:  Postoperative changes as before.  Mild cardiomegaly.  The lungs are clear.  No pleural effusion

## 2022-12-21 NOTE — ASSESSMENT & PLAN NOTE
BMP reviewed- noted Estimated Creatinine Clearance: 22.3 mL/min (A) (based on SCr of 2.5 mg/dL (H)). according to latest data. Monitor UOP and serial BMP and adjust therapy as needed. Renally dose meds.  Baseline ~1.9.   · Likely pre-renal from dehydration and volume losses in setting of persistent epistaxis  · If no improvement with conservative measures overnight can further work up  · Consider urine lytes and renal ultrasound  · Strict I&Os  · Gentle IVF  · Avoid Nephrotoxic agents

## 2022-12-21 NOTE — HPI
Dariel Urbina is a 82 y.o. male with past medical history of CAD s/p CABG, PCI (11/2021), mechanical aortic valve (2014) on coumadin, HFmEF, CVA, CKD III, HTN, who presented to the ED with chest pain and epistaxis. Patient reports long-standing history of episodes of epistaxis and has had several cauterizations with ENT. Last admission for this issue was in October. Found to be supratherapeutic at warfarin clinic and was told to hold, so he has not taken it for the last 2 days.  This episode the bleeding began last night and has persisted this morning. He also c/o chronic recurrent exertional chest pain that is worse than his baseline. He says it is a central chest pressure that feels like indigestion. He endorses some lightheadedness and fatigue, as well as SOB which he says is baseline. He denies diaphoresis or nausea/ vomiting, fevers or chills, abdominal pain, N/V/D, blood in stool, blood in urine.    Patient hypertensive to 198/81 max on admit, otherwise VSS. AF. Hgb 7.8. . PT 38/ INR 4.0. Creatinine 2.5 (baseline ~1.9, though variable). . Trop 0.047 -> 0.052. EKG in NSR with 1st degree AV block. CXR stable. Rhinorocket placed in ED, though bleeding not controlled at time of my exam.

## 2022-12-21 NOTE — ASSESSMENT & PLAN NOTE
Chest pain in setting of acute bleeding and Hypertension. Exertional CP is chronic though pt reports this episode was worse than baseline.  -trop 0.047 -> 0.052, trending q4  -BNP elevated, but below baseline. No acute HF.   -ECG in NSR with 1st degree AV block unchanged from baseline  -CXR without acute change   -TTE 10/22 reviewed  -tele monitoring  -K>4, Mg>2  - continue coreg, imdur

## 2022-12-21 NOTE — ASSESSMENT & PLAN NOTE
- hypertensive on admit despite volume losses   - will continue coreg with caution and close monitoring; prn hydralazine PO  - hold lasix and lisinopril for JIM  - bleeding control as above

## 2022-12-21 NOTE — ED NOTES
Patient placed in gown. [place] and urinal provided. Patient given call light and instructed to call if assistance needed

## 2022-12-21 NOTE — CONSULTS
[facility] - Emergency Dept  Otorhinolaryngology-Head & Neck Surgery  Consult Note    Patient Name: Dariel Urbina  MRN: [id number]  Code Status: Full Code  Admission Date: 12/21/2022  Hospital Length of Stay: 0 days  Attending Physician: Aurelia Perry MD  Primary Care Provider: Devon Langston MD    Patient information was obtained from patient and ER records.     Inpatient consult to ENT  Consult performed by: Yenifer Sandoval MD  Consult ordered by: Inga Singh PA-C        Subjective:     Chief Complaint/Reason for Admission: Epistaxis    History of Present Illness: The patient is an 82 year old male who presents to the ED with continued epistaxis since 10:30 pm last night. The ED placed a rapid rhino but the patient reports continued bleeding, so ENT was consulted. The patient reports a history of recurrent epistaxis and was cauterized in the office with Dr. Patel about 1 month ago. He denies any aggravating factors to the epistaxis.       Medications:  Continuous Infusions:  Scheduled Meds:   [START ON 12/22/2022] allopurinoL  100 mg Oral Daily    atorvastatin  80 mg Oral QHS    carvediloL  12.5 mg Oral BID    [START ON 12/22/2022] ferrous gluconate  324 mg Oral Daily with breakfast    omega-3 acid ethyl esters  2 g Oral BID    prednisol ace-gatiflox-bromfen  1 drop Ophthalmic TID     PRN Meds:acetaminophen, albuterol-ipratropium, ALPRAZolam, aluminum-magnesium hydroxide-simethicone, dextrose 10%, dextrose 10%, glucagon (human recombinant), glucose, glucose, hydrALAZINE, loperamide, naloxone, ondansetron, oxymetazoline, prochlorperazine     Current Facility-Administered Medications on File Prior to Encounter   Medication    ondansetron injection 4 mg    ondansetron injection 4 mg    phenylephrine HCL 2.5% ophthalmic solution 1 drop    phenylephrine HCL 2.5% ophthalmic solution 1 drop    proparacaine 0.5 % ophthalmic solution 1 drop    proparacaine 0.5 % ophthalmic solution 1 drop    tropicamide  1% ophthalmic solution 1 drop    tropicamide 1% ophthalmic solution 1 drop     Current Outpatient Medications on File Prior to Encounter   Medication Sig    allopurinoL (ZYLOPRIM) 100 MG tablet TAKE 1 TABLET EVERY DAY    bumetanide (BUMEX) 1 MG tablet Take 1 tablet (1 mg total) by mouth once daily.    carvediloL (COREG) 12.5 MG tablet Take 1 tablet (12.5 mg total) by mouth 2 (two) times daily with meals.    ferrous gluconate (FERGON) 324 MG tablet Take 1 tablet (324 mg total) by mouth daily with breakfast.    isosorbide mononitrate (IMDUR) 120 MG 24 hr tablet Take 1 tablet (120 mg total) by mouth once daily.    lactose-reduced food (ENSURE ORAL) Take 1-2 cans by mouth once daily    lisinopriL (PRINIVIL,ZESTRIL) 2.5 MG tablet Take 1 tablet (2.5 mg total) by mouth once daily.    loperamide (IMODIUM) 2 mg capsule Take 2 mg by mouth 4 (four) times daily as needed for Diarrhea.    omega-3 acid ethyl esters (LOVAZA) 1 gram capsule Take 2 capsules (2 g total) by mouth 2 (two) times daily.    prednisolon/gatiflox/bromfenac (PREDNISOL ACE-GATIFLOX-BROMFEN) 1-0.5-0.075 % DrpS Apply 1 drop to eye 3 (three) times daily. One drop 3 times a day in surgical eye    prednisolon/gatiflox/bromfenac (PREDNISOL ACE-GATIFLOX-BROMFEN) 1-0.5-0.075 % DrpS Apply 1 drop to eye 3 (three) times daily. One drop 3 times a day in surgical eye    rosuvastatin (CRESTOR) 40 MG Tab TAKE 1 TABLET EVERY EVENING.    warfarin (COUMADIN) 5 MG tablet Take 1 tablet (5 mg) by mouth Monday, Tuesday, Wednesday, Friday & Saturday then 1.5 tablets (7.5mg) by mouth all other days (Thurs & Sun) as instructed by [facility]. (Patient taking differently: Take 1 tablet (5 mg) by mouth Monday&Friday then 1.5 tablets (7.5mg) by mouth all other days as instructed by [facility].)       Review of patient's allergies indicates:   Allergen Reactions    Fosinopril      Intolerance- elevates potassium level      Losartan      Intolerance- elevates potassium level        Past Medical History:   Diagnosis Date    Anemia of chronic renal failure, stage 3 (moderate) 05/27/2015    Anticoagulant long-term use     Atherosclerosis of coronary artery bypass graft of native heart without angina pectoris 09/11/2012    3-27-18 [facility] Two vessel coronary artery disease.   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    Bilateral carotid artery disease 02/09/2017    Bleeding from the nose     Bleeding nose 03/21/2018    Cataract     CKD (chronic kidney disease) stage 3, GFR 30-59 ml/min 05/27/2015    Claudication of left lower extremity 09/17/2014    Colon polyp     Encounter for blood transfusion     Gastroesophageal reflux disease without esophagitis 03/19/2018    Gastrointestinal hemorrhage associated with intestinal diverticulosis 04/01/2018    Glaucoma     H/O mechanical aortic valve replacement 09/17/2014    History of gout 09/26/2012    Hyperparathyroidism due to renal insufficiency 07/27/2015    Internal hemorrhoid 04/03/2018    Long term current use of anticoagulant therapy 09/26/2012    Mechanical heart valve present     Metabolic acidosis with normal anion gap and bicarbonate losses 03/20/2018    Mixed hyperlipidemia 09/26/2012    NSTEMI (non-ST elevated myocardial infarction) 03/21/2018    Obesity, diabetes, and hypertension syndrome 02/23/2016    PVD (peripheral vascular disease) 09/11/2012    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    Syncope 09/29/2022    Type 2 diabetes mellitus with diabetic peripheral angiopathy without gangrene 05/27/2015    Type 2 diabetes mellitus with stage 3 chronic kidney disease, without long-term current use of insulin 10/02/2013     Past Surgical History:   Procedure Laterality Date    CARDIAC CATHETERIZATION      CARDIAC VALVE REPLACEMENT      CARDIAC VALVE SURGERY      CARPAL TUNNEL RELEASE Right 05/19/2020    Procedure: RELEASE, CARPAL TUNNEL;  Surgeon: Rupesh Norris Jr., MD;  Location: [facility];  Service: Plastics;   Laterality: Right;    CATARACT EXTRACTION Left 11/13/2022        COLON SURGERY      COLONOSCOPY N/A 03/31/2017    Procedure: COLONOSCOPY;  Surgeon: Bruno Raymond MD;  Location: [facility] ENDO (4TH FLR);  Service: Endoscopy;  Laterality: N/A;  Patient's wife requesting date.    COLONOSCOPY N/A 04/03/2018    Procedure: COLONOSCOPY;  Surgeon: Bonifacio Pelletier MD;  Location: [facility] ENDO (2ND FLR);  Service: Endoscopy;  Laterality: N/A;    COLONOSCOPY N/A 08/13/2018    Procedure: COLONOSCOPY;  Surgeon: Kam Barba MD;  Location: [facility] ENDO (2ND FLR);  Service: Endoscopy;  Laterality: N/A;  2nd floor: PA pressure 49; hx of moderate-severe valve disease     per Coumadin clinic-Patient can hold 5 days with lovenox bridge       ok to schedule per Katarina    CORONARY ANGIOGRAPHY N/A 10/04/2021    Procedure: Left heart cath +/- peripheral angiogram;  Surgeon: Jose Ruiz MD;  Location: [facility] CATH LAB;  Service: Cardiology;  Laterality: N/A;    CORONARY ANGIOPLASTY      CORONARY ARTERY BYPASS GRAFT      CORONARY BYPASS GRAFT ANGIOGRAPHY  10/04/2021    Procedure: Bypass graft study;  Surgeon: Jose Ruiz MD;  Location: [facility] CATH LAB;  Service: Cardiology;;    HERNIA REPAIR      INTRAOCULAR PROSTHESES INSERTION Left 11/13/2022    Procedure: INSERTION, IOL PROSTHESIS;  Surgeon: Alia Mckeon MD;  Location: [facility] OR [address] [place] [address];  Service: Ophthalmology;  Laterality: Left;    INTRAOCULAR PROSTHESES INSERTION Right 12/4/2022    Procedure: INSERTION, IOL PROSTHESIS;  Surgeon: Alia Mckeon MD;  Location: [address];  Service: Ophthalmology;  Laterality: Right;    PHACOEMULSIFICATION OF CATARACT Left 11/13/2022    Procedure: PHACOEMULSIFICATION, CATARACT;  Surgeon: Alia Mckeon MD;  Location: [facility] OR [address] [place] [address];  Service: Ophthalmology;  Laterality: Left;    PHACOEMULSIFICATION OF CATARACT Right 12/4/2022    Procedure: PHACOEMULSIFICATION, CATARACT;  Surgeon: Alia Mckeon MD;  Location: [facility] OR [address] [place] [address];   Service: Ophthalmology;  Laterality: Right;    SPINE SURGERY      VASECTOMY       Family History       Problem Relation (Age of Onset)    Alcohol abuse Father    Diabetes Brother    Heart disease Mother, Father, Brother, Sister    Heart failure Mother, Father, Brother    No Known Problems Sister, Maternal Grandmother, Maternal Grandfather, Paternal Grandmother, Paternal Grandfather, Maternal Aunt, Maternal Uncle, Paternal Aunt, Paternal Uncle          Tobacco Use    Smoking status: Former     Packs/day: 1.00     Years: 20.00     Pack years: 20.00     Types: Cigarettes     Quit date: 1980     Years since quittin.3     Passive exposure: Never    Smokeless tobacco: Never   Substance and Sexual Activity    Alcohol use: No    Drug use: No    Sexual activity: Not Currently     Review of Systems   Constitutional:  Negative for chills and fever.   HENT:  Negative for sore throat and trouble swallowing.    Eyes:  Negative for pain and redness.   Respiratory:  Negative for cough and stridor.    Cardiovascular:  Positive for chest pain. Negative for palpitations.   Gastrointestinal:  Negative for diarrhea and vomiting.   Endocrine: Negative for cold intolerance and heat intolerance.   Genitourinary:  Negative for flank pain and hematuria.   Musculoskeletal:  Negative for neck pain and neck stiffness.   Skin:  Negative for pallor and rash.   Allergic/Immunologic: Negative for environmental allergies and immunocompromised state.   Neurological:  Negative for seizures and headaches.   Hematological:  Negative for adenopathy. Does not bruise/bleed easily.   Psychiatric/Behavioral:  Negative for agitation and confusion.    Objective:     Vital Signs (Most Recent):  Temp: 97.4 °F (36.3 °C) (22 0943)  Pulse: 83 (22 1659)  Resp: (!) 22 (22 1602)  BP: (!) 166/73 (22 1659)  SpO2: 97 % (22 1602)   Vital Signs (24h Range):  Temp:  [97.4 °F (36.3 °C)] 97.4 °F (36.3 °C)  Pulse:  [62-83] 83  Resp:   [16-22] 22  SpO2:  [97 %-100 %] 97 %  BP: (137-198)/(64-82) 166/73     Weight: 78 kg (171 lb 15.3 oz)  Body mass index is 28.62 kg/m².        Physical Exam  Gen: in no acute distress  Nose: normal external nose, rapid rhino in place with dried blood anteriorly  Oropharynx: small amount of bright red blood in oropharynx  CV/Resp: breathing comfortably on room air, regular heart rate  Abd: flat  Neuro: aaox3, moves all extremities       Significant Labs:  CBC:   Recent Labs   Lab 12/21/22  1701   WBC 6.67   RBC 2.49*   HGB 7.7*   HCT 24.4*   *   MCV 98   MCH 30.9   MCHC 31.6*     CMP:   Recent Labs   Lab 12/21/22  1034   GLU 99   CALCIUM 9.6   ALBUMIN 3.6   PROT 7.1      K 5.1   CO2 20*      BUN 62*   CREATININE 2.5*   ALKPHOS 55   ALT 15   AST 16   BILITOT 0.6       Significant Diagnostics:  None      Assessment/Plan:     Recurrent epistaxis  The patient is an 82 year old male with recurrent epistaxis s/p multiple cauterizations in the office, now with recurrent bleeding. Rapid rhino was removed and left nasal septum cauterized with silver nitrate. Fibrillar was placed bilaterally and doused with Afrin. No further bleeding at this time.     - Recommend continued Afrin q4h x24 hours  - Packing will dissolve with time  - Recommend vaseline to bilateral nares once packing dissolves to keep nasal mucosa moist.  - If bleeding occurs, spray Afrin in nose and hold pressure for 20 minutes straight. Call ENT if bleeding persists despite this.        VTE Risk Mitigation (From admission, onward)           Ordered     IP VTE HIGH RISK PATIENT  Once         12/21/22 1717     Place sequential compression device  Until discontinued         12/21/22 1717                      Yenifer Sandoval MD  Otorhinolaryngology-Head & Neck Surgery  [facility] - Emergency Dept   back pain

## 2022-12-21 NOTE — ED PROVIDER NOTES
"Encounter Date: 12/21/2022       History     Chief Complaint   Patient presents with    Epistaxis     Since lastnight and c/p. Denies n/v.      Dariel Urbina is a 82 y.o. male with PMH of NSTEMI, mechanical valve on warfarin, type 2 diabetes, [place] [facility] [place].  Patient states that his nose started bleeding around 9:00 p.m. last evening on 12/20/2022.  Reports copious amount of blood boss.  States that he has been supratherapeutic on his warfarin and has not taken it for the last 2 days.  He has been attempting to use Afrin to stop his nosebleed but has been unsuccessful periods endorses shortness of breath at baseline but states that it has worsened over the last evening.  He started experiencing exertional chest pain this morning around 1:00 a.m..  States that it is located substernally, like "someone is grabbing my chest", alleviated by rest.  Denies recent fever, cough, congestion.    Review of patient's allergies indicates:   Allergen Reactions    Fosinopril      Intolerance- elevates potassium level      Losartan      Intolerance- elevates potassium level     Past Medical History:   Diagnosis Date    Anemia of chronic renal failure, stage 3 (moderate) 05/27/2015    Anticoagulant long-term use     Atherosclerosis of coronary artery bypass graft of native heart without angina pectoris 09/11/2012    3-27-18 [facility] Two vessel coronary artery disease.   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    Bilateral carotid artery disease 02/09/2017    Bleeding from the nose     Bleeding nose 03/21/2018    Cataract     CKD (chronic kidney disease) stage 3, GFR 30-59 ml/min 05/27/2015    Claudication of left lower extremity 09/17/2014    Colon polyp     Encounter for blood transfusion     Gastroesophageal reflux disease without esophagitis 03/19/2018    Gastrointestinal hemorrhage associated with intestinal diverticulosis 04/01/2018    Glaucoma     H/O mechanical aortic valve " replacement 09/17/2014    History of gout 09/26/2012    Hyperparathyroidism due to renal insufficiency 07/27/2015    Internal hemorrhoid 04/03/2018    Long term current use of anticoagulant therapy 09/26/2012    Mechanical heart valve present     Metabolic acidosis with normal anion gap and bicarbonate losses 03/20/2018    Mixed hyperlipidemia 09/26/2012    NSTEMI (non-ST elevated myocardial infarction) 03/21/2018    Obesity, diabetes, and hypertension syndrome 02/23/2016    PVD (peripheral vascular disease) 09/11/2012    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    Syncope 09/29/2022    Type 2 diabetes mellitus with diabetic peripheral angiopathy without gangrene 05/27/2015    Type 2 diabetes mellitus with stage 3 chronic kidney disease, without long-term current use of insulin 10/02/2013     Past Surgical History:   Procedure Laterality Date    CARDIAC CATHETERIZATION      CARDIAC VALVE REPLACEMENT      CARDIAC VALVE SURGERY      CARPAL TUNNEL RELEASE Right 05/19/2020    Procedure: RELEASE, CARPAL TUNNEL;  Surgeon: Rupesh Norris Jr., MD;  Location: [facility];  Service: Plastics;  Laterality: Right;    CATARACT EXTRACTION Left 11/13/2022        COLON SURGERY      COLONOSCOPY N/A 03/31/2017    Procedure: COLONOSCOPY;  Surgeon: Bruno Raymond MD;  Location: [facility] (4TH FLR);  Service: Endoscopy;  Laterality: N/A;  Patient's wife requesting date.    COLONOSCOPY N/A 04/03/2018    Procedure: COLONOSCOPY;  Surgeon: Bonifacio Pelletier MD;  Location: [facility] ENDO (2ND FLR);  Service: Endoscopy;  Laterality: N/A;    COLONOSCOPY N/A 08/13/2018    Procedure: COLONOSCOPY;  Surgeon: Kam Barba MD;  Location: [facility] ENDO (2ND FLR);  Service: Endoscopy;  Laterality: N/A;  2nd floor: PA pressure 49; hx of moderate-severe valve disease     per Coumadin clinic-Patient can hold 5 days with lovenox bridge       ok to schedule per Katarina    CORONARY ANGIOGRAPHY N/A 10/04/2021    Procedure: Left heart cath +/- peripheral  angiogram;  Surgeon: Jose Ruiz MD;  Location: [facility] CATH LAB;  Service: Cardiology;  Laterality: N/A;    CORONARY ANGIOPLASTY      CORONARY ARTERY BYPASS GRAFT      CORONARY BYPASS GRAFT ANGIOGRAPHY  10/04/2021    Procedure: Bypass graft study;  Surgeon: Jose Ruiz MD;  Location: [facility] CATH LAB;  Service: Cardiology;;    HERNIA REPAIR      INTRAOCULAR PROSTHESES INSERTION Left 11/13/2022    Procedure: INSERTION, IOL PROSTHESIS;  Surgeon: Alia Mckeon MD;  Location: [address];  Service: Ophthalmology;  Laterality: Left;    INTRAOCULAR PROSTHESES INSERTION Right 12/4/2022    Procedure: INSERTION, IOL PROSTHESIS;  Surgeon: Alia Mckeon MD;  Location: [address];  Service: Ophthalmology;  Laterality: Right;    PHACOEMULSIFICATION OF CATARACT Left 11/13/2022    Procedure: PHACOEMULSIFICATION, CATARACT;  Surgeon: Alia Mckeon MD;  Location: [address];  Service: Ophthalmology;  Laterality: Left;    PHACOEMULSIFICATION OF CATARACT Right 12/4/2022    Procedure: PHACOEMULSIFICATION, CATARACT;  Surgeon: Alia Mckeon MD;  Location: [address];  Service: Ophthalmology;  Laterality: Right;    SPINE SURGERY      VASECTOMY       Family History   Problem Relation Age of Onset    Heart failure Mother     Heart disease Mother     Heart failure Father     Heart disease Father     Alcohol abuse Father     Heart failure Brother     Heart disease Brother     Diabetes Brother     No Known Problems Sister     No Known Problems Maternal Grandmother     No Known Problems Maternal Grandfather     No Known Problems Paternal Grandmother     No Known Problems Paternal Grandfather     Heart disease Sister     No Known Problems Maternal Aunt     No Known Problems Maternal Uncle     No Known Problems Paternal Aunt     No Known Problems Paternal Uncle     Amblyopia Neg Hx     Blindness Neg Hx     Cancer Neg Hx     Cataracts Neg Hx     Glaucoma Neg Hx     Hypertension Neg Hx     Macular degeneration Neg  Hx     Retinal detachment Neg Hx     Strabismus Neg Hx     Stroke Neg Hx     Thyroid disease Neg Hx     Anemia Neg Hx     Arrhythmia Neg Hx     Asthma Neg Hx     Clotting disorder Neg Hx     Fainting Neg Hx     Heart attack Neg Hx     Hyperlipidemia Neg Hx     Atrial Septal Defect Neg Hx     Melanoma Neg Hx      Social History     Tobacco Use    Smoking status: Former     Packs/day: 1.00     Years: 20.00     Pack years: 20.00     Types: Cigarettes     Quit date: 1980     Years since quittin.3     Passive exposure: Never    Smokeless tobacco: Never   Substance Use Topics    Alcohol use: No    Drug use: No     Review of Systems    Physical Exam     Initial Vitals [22 0943]   BP Pulse Resp Temp SpO2   137/64 71 16 97.4 °F (36.3 °C) 98 %      MAP       --         Physical Exam    Nursing note and vitals reviewed.  Constitutional: Vital signs are normal. He appears well-developed and well-nourished. He is cooperative.   HENT:   Head: Normocephalic and atraumatic.   Nose: Epistaxis is observed.   Eyes: Conjunctivae and EOM are normal. Pupils are equal, round, and reactive to light.   Neck: Trachea normal and phonation normal. Neck supple. No tracheal deviation present.   Cardiovascular:  Normal rate, regular rhythm, normal heart sounds, intact distal pulses and normal pulses.     Exam reveals no gallop, no S3, no S4 and no friction rub.       No murmur heard.  Pulmonary/Chest: Breath sounds normal. No respiratory distress. He has no wheezes. He has no rhonchi. He has no rales. He exhibits no tenderness.   Abdominal: Abdomen is soft. He exhibits no distension. There is no abdominal tenderness. There is no rebound and no guarding.   Musculoskeletal:      Cervical back: Neck supple.      Comments: Extremities atraumatic x4.  No clubbing, cyanosis, edema.     Neurological: He is alert and oriented to person, place, and time. No cranial nerve deficit or sensory deficit. GCS score is 15. GCS eye subscore is 4.  GCS verbal subscore is 5. GCS motor subscore is 6.   Skin: Skin is warm, dry and intact. Capillary refill takes less than 2 seconds.   Psychiatric: He has a normal mood and affect. His speech is normal and behavior is normal. Thought content normal.       ED Course   Epistaxis Mgmt    Date/Time: 12/21/2022 2:00 PM  Performed by: Jenn Garrido MD  Authorized by: Lamberto Lee MD   Treatment site: left anterior  Repair method: nasal balloon  Post-procedure assessment: bleeding stopped  Treatment complexity: complex  Patient tolerance: Patient tolerated the procedure well with no immediate complications      Labs Reviewed   CBC W/ AUTO DIFFERENTIAL - Abnormal; Notable for the following components:       Result Value    RBC 2.58 (*)     Hemoglobin 7.8 (*)     Hematocrit 25.3 (*)     MCHC 30.8 (*)     RDW 15.2 (*)     Platelets 137 (*)     All other components within normal limits   COMPREHENSIVE METABOLIC PANEL - Abnormal; Notable for the following components:    CO2 20 (*)     BUN 62 (*)     Creatinine 2.5 (*)     eGFR 25.2 (*)     All other components within normal limits   TROPONIN I - Abnormal; Notable for the following components:    Troponin I 0.047 (*)     All other components within normal limits   TROPONIN I - Abnormal; Notable for the following components:    Troponin I 0.052 (*)     All other components within normal limits   B-TYPE NATRIURETIC PEPTIDE - Abnormal; Notable for the following components:     (*)     All other components within normal limits   PROTIME-INR - Abnormal; Notable for the following components:    Prothrombin Time 38.1 (*)     INR 4.0 (*)     All other components within normal limits   TROPONIN I - Abnormal; Notable for the following components:    Troponin I 0.101 (*)     All other components within normal limits   CBC W/ AUTO DIFFERENTIAL - Abnormal; Notable for the following components:    RBC 2.49 (*)     Hemoglobin 7.7 (*)     Hematocrit 24.4 (*)     MCHC 31.6 (*)      RDW 15.2 (*)     Platelets 137 (*)     All other components within normal limits     EKG Readings: (Independently Interpreted)   Initial Reading: No STEMI. Rhythm: Normal Sinus Rhythm. Heart Rate: 71. Ectopy: No Ectopy.   ECG Results              EKG 12-lead (Final result)  Result time 12/21/22 15:36:35      Final result by Interface, Lab In [facility] (12/21/22 15:36:35)                   Narrative:    Test Reason : R07.9,    Vent. Rate : 071 BPM     Atrial Rate : 071 BPM     P-R Int : 330 ms          QRS Dur : 158 ms      QT Int : 458 ms       P-R-T Axes : 089 029 150 degrees     QTc Int : 497 ms    Sinus rhythm with 1st degree A-V block  Nonspecific intraventricular block  Abnormal ECG  When compared with ECG of 21-DEC-2022 09:45,  No significant change was found  Confirmed by ANA ROLLINS MD ([id number]) on 12/21/2022 3:36:28 PM    Referred By: [email or web address]   SELF           Confirmed By:ANA ORLLINS MD                                     EKG 12-lead (Final result)  Result time 12/21/22 10:45:37      Final result by Interface, Lab In [facility] (12/21/22 10:45:37)                   Narrative:    Test Reason : R07.9,    Vent. Rate : 071 BPM     Atrial Rate : 070 BPM     P-R Int : 328 ms          QRS Dur : 160 ms      QT Int : 460 ms       P-R-T Axes : 137 041 150 degrees     QTc Int : 499 ms    Possibly NSR with first degree but cannot exclude AF with artifact  Nonspecific intraventricular block  Minimal voltage criteria for LVH, may be normal variant ( Alejandro product )  Abnormal ECG  When compared with ECG of 23-OCT-2022 07:50,  The axis Shifted right  T wave inversion now evident in Inferior leads  Confirmed by Jeremias APARICIO MD ([id number]) on 12/21/2022 10:45:32 AM    Referred By: [email or web address]   SELF           Confirmed By:Jeremias APARICIO MD                                  Imaging Results              X-Ray Chest AP Portable (Final result)  Result time 12/21/22 13:42:26      Final result by Kam Dash MD (12/21/22 13:42:26)                    Impression:      See above      Electronically signed by: Kam Dash MD  Date:    12/21/2022  Time:    13:42               Narrative:    EXAMINATION:  XR CHEST AP PORTABLE    CLINICAL HISTORY:  chest pain;    TECHNIQUE:  Single frontal view of the chest was performed.    COMPARISON:  Non 10/23/2022 e    FINDINGS:  Postoperative changes as before.  Mild cardiomegaly.  The lungs are clear.  No pleural effusion                                       Medications   carvediloL tablet 12.5 mg (0 mg Oral Hold 12/22/22 0900)   allopurinoL tablet 100 mg (0 mg Oral Hold 12/22/22 0900)   ferrous gluconate tablet 324 mg (0 mg Oral Hold 12/22/22 0745)   loperamide capsule 2 mg (has no administration in time range)   omega-3 acid ethyl esters capsule 2 g (0 g Oral Hold 12/22/22 0900)   prednisol ace-gatiflox-bromfen 1-0.5-0.075 % DrpS 1 drop (0 drops Ophthalmic Hold 12/22/22 0900)   atorvastatin tablet 80 mg (80 mg Oral Given 12/21/22 2047)   oxymetazoline 0.05 % nasal spray 2 spray (has no administration in time range)   ALPRAZolam tablet 0.25 mg (0.25 mg Oral Given 12/21/22 2047)   albuterol-ipratropium 2.5 mg-0.5 mg/3 mL nebulizer solution 3 mL (has no administration in time range)   prochlorperazine injection Soln 5 mg (has no administration in time range)   acetaminophen tablet 650 mg (has no administration in time range)   aluminum-magnesium hydroxide-simethicone 200-200-20 mg/5 mL suspension 30 mL (has no administration in time range)   naloxone 0.4 mg/mL injection 0.02 mg (has no administration in time range)   glucose chewable tablet 16 g (has no administration in time range)   glucose chewable tablet 24 g (has no administration in time range)   glucagon (human recombinant) injection 1 mg (has no administration in time range)   dextrose 10% bolus 125 mL 125 mL (has no administration in time range)   dextrose 10% bolus 250 mL 250 mL (has no administration in time range)   hydrALAZINE tablet 25 mg (has no  administration in time range)   ondansetron tablet 4 mg (has no administration in time range)   sodium chloride 0.9% bolus 500 mL 500 mL (has no administration in time range)   phenylephrine HCL 0.5% nasal spray 2 spray (2 sprays Each Nostril Given by Provider 12/21/22 0040)   ondansetron disintegrating tablet 4 mg (4 mg Oral Given 12/21/22 1214)     Medical Decision Making:   Initial Assessment:   Dariel Urbina is a 82 y.o. male with PMH of NSTEMI, mechanical valve on warfarin, type 2 diabetes, presenting to [facility] ED for nosebleed.  Initially, patient is hemodynamically stable and well-appearing.  Trace amount of epistaxis noted.  Differential Diagnosis:   Epistaxis, supratherapeutic on warfarin, anemia, ACS, stable angina, pneumonia, pneumothorax  ED Management:  Patient presents with epistaxis uncontrolled at home.  Additionally, patient reporting significant amount of blood loss at home with exertional chest pain concerning for stable angina.  Will conduct chest pain workup.     Workup as detailed below in ED course.  Workup significant for anemia, decrease in hemoglobin 2 points below baseline.  Patient's troponin is mildly elevated at 0.047, will trend.  Patient's BNP is elevated at 385, chest x-ray does not show any pulmonary edema.  Will not diurese at this time.    Initially attempted to control bleeding with phenylephrine and direct pressure.  Patient continued to bleed.  Rhino rocket placed with improvement in rate and bleeding.    Discussed patient's case with Hospital Medicine who agreed to admit patient to their service for symptomatic anemia and possible ENT consultation.            Attending Attestation:   Physician Attestation Statement for Resident:  As the supervising MD   Physician Attestation Statement: I have personally seen and examined this patient.   I agree with the above history.  -:   As the supervising MD I agree with the above PE.     As the supervising MD I agree with the above  treatment, course, plan, and disposition.   -: Complex epistaxis management provided by Dr. Garrido with nasal packing,.  Balloon inflated with good hemostasis.  Hgb drop noted, INR elevated, will obs to  for management.     I was personally present during the entire procedure.                ED Course as of 12/22/22 1029   Wed Dec 21, 2022   1122 HEMOGLOBIN(!): 7.8  Normocytic anemia, no transfusion indicated at this time.  Significant decrease from baseline [DC]   1215 Troponin I(!): 0.047 [ES]   1215 BNP(!): 385 [ES]   1258 Protime(!): 38.1 [ES]   1258 INR(!): 4.0 [ES]   1353 X-Ray Chest AP Portable  No pneumonia, pulmonary edema, pneumothorax. [ES]   1426 Troponin I(!): 0.052 [ES]      ED Course User Index  [DC] Lamberto Lee MD  [ES] Jenn Garrido MD          Critical Care   Date: 12/22/2022  Performed by: Lamberto Lee MD   Authorized by: Lamberto Lee MD    Total critical care time (exclusive of procedural time) : 30 minutes  Critical care was necessary to treat or prevent imminent or life-threatening deterioration of the following conditions:  Hemorrhage, epistaxis, warfarin poisoning         Clinical Impression:   Final diagnoses:  [R07.9] Chest pain  [D64.9] Symptomatic anemia  [R04.0] Epistaxis (Primary)  [Z79.01] On warfarin therapy  [T45.511A] Poisoning by warfarin sodium, accidental or unintentional, initial encounter        ED Disposition Condition    Observation Stable                Jenn Garrido MD  Resident  12/21/22 [id number]       Lamberto Lee MD  12/22/22 1030

## 2022-12-21 NOTE — ASSESSMENT & PLAN NOTE
Recurrent Epistaxis   Thrombocytopenia  Long term AC   Supra therapeutic INR    · Bleeding persistent despite rhinorocket by ED and Afrin  · Mupirocin while packing in place   · ENT consulted, appreciate assistance   · IR consulted for embolization given recurrent nose bleeds  · INR down to 3.4, goal INR 2-3  · Hgb 6.9 today, baseline around 9 - will transfuse 1 unit PRBCs

## 2022-12-21 NOTE — ED NOTES
Pt resting in bed. No complaints voiced.  Ice water provided at pt request. Urinal & call light in reach. Family  member at BS

## 2022-12-21 NOTE — SUBJECTIVE & OBJECTIVE
Medications:  Continuous Infusions:  Scheduled Meds:   [START ON 12/22/2022] allopurinoL  100 mg Oral Daily    atorvastatin  80 mg Oral QHS    carvediloL  12.5 mg Oral BID    [START ON 12/22/2022] ferrous gluconate  324 mg Oral Daily with breakfast    omega-3 acid ethyl esters  2 g Oral BID    prednisol ace-gatiflox-bromfen  1 drop Ophthalmic TID     PRN Meds:acetaminophen, albuterol-ipratropium, ALPRAZolam, aluminum-magnesium hydroxide-simethicone, dextrose 10%, dextrose 10%, glucagon (human recombinant), glucose, glucose, hydrALAZINE, loperamide, naloxone, ondansetron, oxymetazoline, prochlorperazine     Current Facility-Administered Medications on File Prior to Encounter   Medication    ondansetron injection 4 mg    ondansetron injection 4 mg    phenylephrine HCL 2.5% ophthalmic solution 1 drop    phenylephrine HCL 2.5% ophthalmic solution 1 drop    proparacaine 0.5 % ophthalmic solution 1 drop    proparacaine 0.5 % ophthalmic solution 1 drop    tropicamide 1% ophthalmic solution 1 drop    tropicamide 1% ophthalmic solution 1 drop     Current Outpatient Medications on File Prior to Encounter   Medication Sig    allopurinoL (ZYLOPRIM) 100 MG tablet TAKE 1 TABLET EVERY DAY    bumetanide (BUMEX) 1 MG tablet Take 1 tablet (1 mg total) by mouth once daily.    carvediloL (COREG) 12.5 MG tablet Take 1 tablet (12.5 mg total) by mouth 2 (two) times daily with meals.    ferrous gluconate (FERGON) 324 MG tablet Take 1 tablet (324 mg total) by mouth daily with breakfast.    isosorbide mononitrate (IMDUR) 120 MG 24 hr tablet Take 1 tablet (120 mg total) by mouth once daily.    lactose-reduced food (ENSURE ORAL) Take 1-2 cans by mouth once daily    lisinopriL (PRINIVIL,ZESTRIL) 2.5 MG tablet Take 1 tablet (2.5 mg total) by mouth once daily.    loperamide (IMODIUM) 2 mg capsule Take 2 mg by mouth 4 (four) times daily as needed for Diarrhea.    omega-3 acid ethyl esters (LOVAZA) 1 gram capsule Take 2 capsules (2 g total) by  mouth 2 (two) times daily.    prednisolon/gatiflox/bromfenac (PREDNISOL ACE-GATIFLOX-BROMFEN) 1-0.5-0.075 % DrpS Apply 1 drop to eye 3 (three) times daily. One drop 3 times a day in surgical eye    prednisolon/gatiflox/bromfenac (PREDNISOL ACE-GATIFLOX-BROMFEN) 1-0.5-0.075 % DrpS Apply 1 drop to eye 3 (three) times daily. One drop 3 times a day in surgical eye    rosuvastatin (CRESTOR) 40 MG Tab TAKE 1 TABLET EVERY EVENING.    warfarin (COUMADIN) 5 MG tablet Take 1 tablet (5 mg) by mouth Monday, Tuesday, Wednesday, Friday & Saturday then 1.5 tablets (7.5mg) by mouth all other days (Thurs & Sun) as instructed by [facility]. (Patient taking differently: Take 1 tablet (5 mg) by mouth Monday&Friday then 1.5 tablets (7.5mg) by mouth all other days as instructed by [facility]madin Clinic.)       Review of patient's allergies indicates:   Allergen Reactions    Fosinopril      Intolerance- elevates potassium level      Losartan      Intolerance- elevates potassium level       Past Medical History:   Diagnosis Date    Anemia of chronic renal failure, stage 3 (moderate) 05/27/2015    Anticoagulant long-term use     Atherosclerosis of coronary artery bypass graft of native heart without angina pectoris 09/11/2012    3-27-18 [facility] Two vessel coronary artery disease.   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    Bilateral carotid artery disease 02/09/2017    Bleeding from the nose     Bleeding nose 03/21/2018    Cataract     CKD (chronic kidney disease) stage 3, GFR 30-59 ml/min 05/27/2015    Claudication of left lower extremity 09/17/2014    Colon polyp     Encounter for blood transfusion     Gastroesophageal reflux disease without esophagitis 03/19/2018    Gastrointestinal hemorrhage associated with intestinal diverticulosis 04/01/2018    Glaucoma     H/O mechanical aortic valve replacement 09/17/2014    History of gout 09/26/2012    Hyperparathyroidism due to renal insufficiency 07/27/2015    Internal  hemorrhoid 04/03/2018    Long term current use of anticoagulant therapy 09/26/2012    Mechanical heart valve present     Metabolic acidosis with normal anion gap and bicarbonate losses 03/20/2018    Mixed hyperlipidemia 09/26/2012    NSTEMI (non-ST elevated myocardial infarction) 03/21/2018    Obesity, diabetes, and hypertension syndrome 02/23/2016    PVD (peripheral vascular disease) 09/11/2012    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    Syncope 09/29/2022    Type 2 diabetes mellitus with diabetic peripheral angiopathy without gangrene 05/27/2015    Type 2 diabetes mellitus with stage 3 chronic kidney disease, without long-term current use of insulin 10/02/2013     Past Surgical History:   Procedure Laterality Date    CARDIAC CATHETERIZATION      CARDIAC VALVE REPLACEMENT      CARDIAC VALVE SURGERY      CARPAL TUNNEL RELEASE Right 05/19/2020    Procedure: RELEASE, CARPAL TUNNEL;  Surgeon: Rupesh Norris Jr., MD;  [facility];  Service: Plastics;  Laterality: Right;    CATARACT EXTRACTION Left 11/13/2022        COLON SURGERY      COLONOSCOPY N/A 03/31/2017    Procedure: COLONOSCOPY;  Surgeon: Bruno Raymond MD;  Location: [facility] (4TH FLR);  Service: Endoscopy;  Laterality: N/A;  Patient's wife requesting date.    COLONOSCOPY N/A 04/03/2018    Procedure: COLONOSCOPY;  Surgeon: Bonifacio Pelletier MD;  Location: [facility] (2ND FLR);  Service: Endoscopy;  Laterality: N/A;    COLONOSCOPY N/A 08/13/2018    Procedure: COLONOSCOPY;  Surgeon: Kam Barba MD;  Location: [facility] (2ND FLR);  Service: Endoscopy;  Laterality: N/A;  2nd floor: PA pressure 49; hx of moderate-severe valve disease     per Coumadin clinic-Patient can hold 5 days with lovenox bridge       ok to schedule per Katarina    CORONARY ANGIOGRAPHY N/A 10/04/2021    Procedure: Left heart cath +/- peripheral angiogram;  Surgeon: Jose uRiz MD;  Location: [facility] CATH LAB;  Service: Cardiology;  Laterality: N/A;    CORONARY ANGIOPLASTY       CORONARY ARTERY BYPASS GRAFT      CORONARY BYPASS GRAFT ANGIOGRAPHY  10/04/2021    Procedure: Bypass graft study;  Surgeon: Jose Ruiz MD;  Location: [facility] CATH LAB;  Service: Cardiology;;    HERNIA REPAIR      INTRAOCULAR PROSTHESES INSERTION Left 2022    Procedure: INSERTION, IOL PROSTHESIS;  Surgeon: Alia Mckeon MD;  Location: [facility] OR [address] [place] [address];  Service: Ophthalmology;  Laterality: Left;    INTRAOCULAR PROSTHESES INSERTION Right 2022    Procedure: INSERTION, IOL PROSTHESIS;  Surgeon: Alia Mckeon MD;  Location: [facility] OR [address] [place] [address];  Service: Ophthalmology;  Laterality: Right;    PHACOEMULSIFICATION OF CATARACT Left 2022    Procedure: PHACOEMULSIFICATION, CATARACT;  Surgeon: Alia Mckeon MD;  Location: [facility] OR [address] [place] [address];  Service: Ophthalmology;  Laterality: Left;    PHACOEMULSIFICATION OF CATARACT Right 2022    Procedure: PHACOEMULSIFICATION, CATARACT;  Surgeon: Alia Mckeon MD;  Location: [facility] OR [address] [place] [address];  Service: Ophthalmology;  Laterality: Right;    SPINE SURGERY      VASECTOMY       Family History       Problem Relation (Age of Onset)    Alcohol abuse Father    Diabetes Brother    Heart disease Mother, Father, Brother, Sister    Heart failure Mother, Father, Brother    No Known Problems Sister, Maternal Grandmother, Maternal Grandfather, Paternal Grandmother, Paternal Grandfather, Maternal Aunt, Maternal Uncle, Paternal Aunt, Paternal Uncle          Tobacco Use    Smoking status: Former     Packs/day: 1.00     Years: 20.00     Pack years: 20.00     Types: Cigarettes     Quit date: 1980     Years since quittin.3     Passive exposure: Never    Smokeless tobacco: Never   Substance and Sexual Activity    Alcohol use: No    Drug use: No    Sexual activity: Not Currently     Review of Systems   Constitutional:  Negative for chills and fever.   HENT:  Negative for sore throat and trouble swallowing.    Eyes:  Negative for pain and redness.   Respiratory:   Negative for cough and stridor.    Cardiovascular:  Positive for chest pain. Negative for palpitations.   Gastrointestinal:  Negative for diarrhea and vomiting.   Endocrine: Negative for cold intolerance and heat intolerance.   Genitourinary:  Negative for flank pain and hematuria.   Musculoskeletal:  Negative for neck pain and neck stiffness.   Skin:  Negative for pallor and rash.   Allergic/Immunologic: Negative for environmental allergies and immunocompromised state.   Neurological:  Negative for seizures and headaches.   Hematological:  Negative for adenopathy. Does not bruise/bleed easily.   Psychiatric/Behavioral:  Negative for agitation and confusion.    Objective:     Vital Signs (Most Recent):  Temp: 97.4 °F (36.3 °C) (12/21/22 0943)  Pulse: 83 (12/21/22 1659)  Resp: (!) 22 (12/21/22 1602)  BP: (!) 166/73 (12/21/22 1659)  SpO2: 97 % (12/21/22 1602)   Vital Signs (24h Range):  Temp:  [97.4 °F (36.3 °C)] 97.4 °F (36.3 °C)  Pulse:  [62-83] 83  Resp:  [16-22] 22  SpO2:  [97 %-100 %] 97 %  BP: (137-198)/(64-82) 166/73     Weight: 78 kg (171 lb 15.3 oz)  Body mass index is 28.62 kg/m².        Physical Exam  Gen: in no acute distress  Nose: normal external nose, rapid rhino in place with dried blood anteriorly  Oropharynx: small amount of bright red blood in oropharynx  CV/Resp: breathing comfortably on room air, regular heart rate  Abd: flat  Neuro: aaox3, moves all extremities       Significant Labs:  CBC:   Recent Labs   Lab 12/21/22  1701   WBC 6.67   RBC 2.49*   HGB 7.7*   HCT 24.4*   *   MCV 98   MCH 30.9   MCHC 31.6*     CMP:   Recent Labs   Lab 12/21/22  1034   GLU 99   CALCIUM 9.6   ALBUMIN 3.6   PROT 7.1      K 5.1   CO2 20*      BUN 62*   CREATININE 2.5*   ALKPHOS 55   ALT 15   AST 16   BILITOT 0.6       Significant Diagnostics:  None

## 2022-12-21 NOTE — CONSULTS
PHARMACY CONSULT NOTE: WARFARIN  Dariel Urbina is a 82 y.o. male on warfarin therapy for Mechanical Heart Valve (aortic). PharmD has been consulted for warfarin dosing. Of note, patient admitted with Epistaxis.     Current order: held  Home dose: 5 mg every Mon, Fri; 7.5 mg all other days  Coumadin clinic enrollment: Active  INR goal: 2-3    Lab Results   Component Value Date    INR 4.0 (H) 12/21/2022    INR 6.6 (HH) 12/19/2022    INR 3.2 (H) 12/05/2022     Diet: Adult Cardiac with Boost supplements     Recommendation(s):   Hold warfarin until hemostasis is achieved and INR < 3.0  INR daily (ordered)  Pharmacy will continue to follow and monitor warfarin    Thank you for the consult,  Roselia Marino, PharmD, BCPS  [id number]     **Note: Consults are reviewed Monday-Friday 7:00am-3:30pm. THE ABOVE RECOMMENDATIONS ARE ONLY SUGGESTED.THE RECOMMENDATIONS SHOULD BE CONSIDERED IN CONJUNCTION WITH ALL PATIENT FACTORS. **

## 2022-12-21 NOTE — AI DETERIORATION ALERT
"RAPID RESPONSE NURSE AI ALERT       AI alert received.    Chart Reviewed: 12/21/2022, 5:52 PM    MRN: [id number]  Bed: ED 09/09    Dx: Acute blood loss anemia    Dariel Urbina has a past medical history of Anemia of chronic renal failure, stage 3 (moderate), Anticoagulant long-term use, Atherosclerosis of coronary artery bypass graft of native heart without angina pectoris, Bilateral carotid artery disease, Bleeding from the nose, Bleeding nose, Cataract, CKD (chronic kidney disease) stage 3, GFR 30-59 ml/min, Claudication of left lower extremity, Colon polyp, Encounter for blood transfusion, Gastroesophageal reflux disease without esophagitis, Gastrointestinal hemorrhage associated with intestinal diverticulosis, Glaucoma, H/O mechanical aortic valve replacement, History of gout, Hyperparathyroidism due to renal insufficiency, Internal hemorrhoid, Long term current use of anticoagulant therapy, Mechanical heart valve present, Metabolic acidosis with normal anion gap and bicarbonate losses, Mixed hyperlipidemia, NSTEMI (non-ST elevated myocardial infarction), Obesity, diabetes, and hypertension syndrome, PVD (peripheral vascular disease), Renovascular hypertension, Syncope, Type 2 diabetes mellitus with diabetic peripheral angiopathy without gangrene, and Type 2 diabetes mellitus with stage 3 chronic kidney disease, without long-term current use of insulin.    Last VS: BP (!) 166/73 (BP Location: Right arm, Patient Position: Lying)   Pulse 83   Temp 97.4 °F (36.3 °C) (Oral)   Resp (!) 21   Ht 5' 5" (1.651 m)   Wt 78 kg (171 lb 15.3 oz)   SpO2 100%   BMI 28.62 kg/m²     24H Vital Sign Range:  Temp:  [97.4 °F (36.3 °C)]   Pulse:  [62-83]   Resp:  [16-22]   BP: (137-198)/(64-82)   SpO2:  [96 %-100 %]     Level of Consciousness (AVPU): alert    Recent Labs     12/21/22  1034 12/21/22  1701   WBC 5.81 6.67   HGB 7.8* 7.7*   HCT 25.3* 24.4*   * 137*       Recent Labs     12/21/22  1034      K 5.1 "      CO2 20*   CREATININE 2.5*   GLU 99        No results for input(s): PH, PCO2, PO2, HCO3, POCSATURATED, BE in the last 72 hours.     OXYGEN:             MEWS score: 2    bedside RN, Rebecca  contacted. No concerns verbalized at this time. Instructed to call [phone] for further concerns or assistance.    Kyle Garza RN

## 2022-12-21 NOTE — ASSESSMENT & PLAN NOTE
- hypertensive on admit despite volume losses   - will continue lisinopril and coreg with caution and close monitoring   - bleeding control as above

## 2022-12-22 LAB
ABO + RH BLD: NORMAL
ALBUMIN SERPL BCP-MCNC: 3.3 G/DL (ref 3.5–5.2)
ALP SERPL-CCNC: 50 U/L (ref 55–135)
ALT SERPL W/O P-5'-P-CCNC: 13 U/L (ref 10–44)
ANION GAP SERPL CALC-SCNC: 8 MMOL/L (ref 8–16)
AST SERPL-CCNC: 20 U/L (ref 10–40)
BASOPHILS # BLD AUTO: 0.03 K/UL (ref 0–0.2)
BASOPHILS NFR BLD: 0.4 % (ref 0–1.9)
BILIRUB SERPL-MCNC: 0.6 MG/DL (ref 0.1–1)
BLD GP AB SCN CELLS X3 SERPL QL: NORMAL
BLD PROD TYP BPU: NORMAL
BLOOD UNIT EXPIRATION DATE: NORMAL
BLOOD UNIT TYPE CODE: 9500
BLOOD UNIT TYPE: NORMAL
BUN SERPL-MCNC: 90 MG/DL (ref 8–23)
CALCIUM SERPL-MCNC: 9.7 MG/DL (ref 8.7–10.5)
CHLORIDE SERPL-SCNC: 108 MMOL/L (ref 95–110)
CO2 SERPL-SCNC: 19 MMOL/L (ref 23–29)
CODING SYSTEM: NORMAL
CREAT SERPL-MCNC: 2.5 MG/DL (ref 0.5–1.4)
DIFFERENTIAL METHOD: ABNORMAL
DISPENSE STATUS: NORMAL
EOSINOPHIL # BLD AUTO: 0.2 K/UL (ref 0–0.5)
EOSINOPHIL NFR BLD: 2.8 % (ref 0–8)
ERYTHROCYTE [DISTWIDTH] IN BLOOD BY AUTOMATED COUNT: 15.1 % (ref 11.5–14.5)
EST. GFR  (NO RACE VARIABLE): 25.2 ML/MIN/1.73 M^2
GLUCOSE SERPL-MCNC: 105 MG/DL (ref 70–110)
HCT VFR BLD AUTO: 22.6 % (ref 40–54)
HGB BLD-MCNC: 6.9 G/DL (ref 14–18)
IMM GRANULOCYTES # BLD AUTO: 0.02 K/UL (ref 0–0.04)
IMM GRANULOCYTES NFR BLD AUTO: 0.3 % (ref 0–0.5)
INR PPP: 3.4 (ref 0.8–1.2)
LYMPHOCYTES # BLD AUTO: 1.6 K/UL (ref 1–4.8)
LYMPHOCYTES NFR BLD: 21.6 % (ref 18–48)
MAGNESIUM SERPL-MCNC: 1.8 MG/DL (ref 1.6–2.6)
MCH RBC QN AUTO: 29.7 PG (ref 27–31)
MCHC RBC AUTO-ENTMCNC: 30.5 G/DL (ref 32–36)
MCV RBC AUTO: 97 FL (ref 82–98)
MONOCYTES # BLD AUTO: 0.7 K/UL (ref 0.3–1)
MONOCYTES NFR BLD: 9.3 % (ref 4–15)
NEUTROPHILS # BLD AUTO: 4.9 K/UL (ref 1.8–7.7)
NEUTROPHILS NFR BLD: 65.6 % (ref 38–73)
NRBC BLD-RTO: 0 /100 WBC
PHOSPHATE SERPL-MCNC: 3 MG/DL (ref 2.7–4.5)
PLATELET # BLD AUTO: 137 K/UL (ref 150–450)
PMV BLD AUTO: 11 FL (ref 9.2–12.9)
POCT GLUCOSE: 162 MG/DL (ref 70–110)
POCT GLUCOSE: 67 MG/DL (ref 70–110)
POCT GLUCOSE: 92 MG/DL (ref 70–110)
POCT GLUCOSE: 98 MG/DL (ref 70–110)
POTASSIUM SERPL-SCNC: 5.3 MMOL/L (ref 3.5–5.1)
PROT SERPL-MCNC: 6.6 G/DL (ref 6–8.4)
PROTHROMBIN TIME: 33.1 SEC (ref 9–12.5)
RBC # BLD AUTO: 2.32 M/UL (ref 4.6–6.2)
SODIUM SERPL-SCNC: 135 MMOL/L (ref 136–145)
TRANS ERYTHROCYTES VOL PATIENT: NORMAL ML
TROPONIN I SERPL DL<=0.01 NG/ML-MCNC: 1.69 NG/ML (ref 0–0.03)
TROPONIN I SERPL DL<=0.01 NG/ML-MCNC: 2.2 NG/ML (ref 0–0.03)
TROPONIN I SERPL DL<=0.01 NG/ML-MCNC: 2.49 NG/ML (ref 0–0.03)
WBC # BLD AUTO: 7.44 K/UL (ref 3.9–12.7)

## 2022-12-22 PROCEDURE — 25000003 PHARM REV CODE 250: Mod: HCNC

## 2022-12-22 PROCEDURE — 84100 ASSAY OF PHOSPHORUS: CPT | Mod: HCNC | Performed by: HOSPITALIST

## 2022-12-22 PROCEDURE — 36415 COLL VENOUS BLD VENIPUNCTURE: CPT | Mod: HCNC

## 2022-12-22 PROCEDURE — 92610 EVALUATE SWALLOWING FUNCTION: CPT | Mod: HCNC

## 2022-12-22 PROCEDURE — 11000001 HC ACUTE MED/SURG PRIVATE ROOM: Mod: HCNC

## 2022-12-22 PROCEDURE — 84484 ASSAY OF TROPONIN QUANT: CPT | Mod: HCNC

## 2022-12-22 PROCEDURE — 99233 PR SUBSEQUENT HOSPITAL CARE,LEVL III: ICD-10-PCS | Mod: HCNC,GC,, | Performed by: INTERNAL MEDICINE

## 2022-12-22 PROCEDURE — 36430 TRANSFUSION BLD/BLD COMPNT: CPT

## 2022-12-22 PROCEDURE — 85610 PROTHROMBIN TIME: CPT | Mod: HCNC | Performed by: HOSPITALIST

## 2022-12-22 PROCEDURE — 84484 ASSAY OF TROPONIN QUANT: CPT | Mod: 91,HCNC

## 2022-12-22 PROCEDURE — 86850 RBC ANTIBODY SCREEN: CPT | Mod: HCNC | Performed by: HOSPITALIST

## 2022-12-22 PROCEDURE — 99291 CRITICAL CARE FIRST HOUR: CPT | Mod: HCNC,,, | Performed by: HOSPITALIST

## 2022-12-22 PROCEDURE — 86900 BLOOD TYPING SEROLOGIC ABO: CPT | Mod: HCNC | Performed by: HOSPITALIST

## 2022-12-22 PROCEDURE — 99291 PR CRITICAL CARE, E/M 30-74 MINUTES: ICD-10-PCS | Mod: HCNC,,, | Performed by: HOSPITALIST

## 2022-12-22 PROCEDURE — 99233 SBSQ HOSP IP/OBS HIGH 50: CPT | Mod: HCNC,GC,, | Performed by: INTERNAL MEDICINE

## 2022-12-22 PROCEDURE — P9021 RED BLOOD CELLS UNIT: HCPCS | Mod: HCNC | Performed by: HOSPITALIST

## 2022-12-22 PROCEDURE — 83735 ASSAY OF MAGNESIUM: CPT | Mod: HCNC | Performed by: HOSPITALIST

## 2022-12-22 PROCEDURE — 86920 COMPATIBILITY TEST SPIN: CPT | Mod: HCNC | Performed by: HOSPITALIST

## 2022-12-22 PROCEDURE — 94761 N-INVAS EAR/PLS OXIMETRY MLT: CPT | Mod: HCNC

## 2022-12-22 PROCEDURE — 80053 COMPREHEN METABOLIC PANEL: CPT | Mod: HCNC | Performed by: HOSPITALIST

## 2022-12-22 PROCEDURE — 85025 COMPLETE CBC W/AUTO DIFF WBC: CPT | Mod: HCNC | Performed by: HOSPITALIST

## 2022-12-22 PROCEDURE — 25000003 PHARM REV CODE 250: Mod: HCNC | Performed by: HOSPITALIST

## 2022-12-22 RX ORDER — CLOPIDOGREL BISULFATE 75 MG/1
75 TABLET ORAL DAILY
Status: DISCONTINUED | OUTPATIENT
Start: 2022-12-23 | End: 2022-12-24

## 2022-12-22 RX ORDER — FAMOTIDINE 20 MG/1
20 TABLET, FILM COATED ORAL DAILY
Status: DISCONTINUED | OUTPATIENT
Start: 2022-12-23 | End: 2022-12-28 | Stop reason: HOSPADM

## 2022-12-22 RX ORDER — CLOPIDOGREL BISULFATE 75 MG/1
150 TABLET ORAL ONCE
Status: COMPLETED | OUTPATIENT
Start: 2022-12-22 | End: 2022-12-22

## 2022-12-22 RX ORDER — HYDROCODONE BITARTRATE AND ACETAMINOPHEN 500; 5 MG/1; MG/1
TABLET ORAL
Status: DISCONTINUED | OUTPATIENT
Start: 2022-12-22 | End: 2022-12-28 | Stop reason: HOSPADM

## 2022-12-22 RX ORDER — ASPIRIN 81 MG/1
81 TABLET ORAL DAILY
Status: DISCONTINUED | OUTPATIENT
Start: 2022-12-23 | End: 2022-12-24

## 2022-12-22 RX ORDER — SODIUM CHLORIDE 9 MG/ML
INJECTION, SOLUTION INTRAVENOUS CONTINUOUS
Status: ACTIVE | OUTPATIENT
Start: 2022-12-22 | End: 2022-12-23

## 2022-12-22 RX ORDER — ASPIRIN 325 MG
325 TABLET ORAL ONCE
Status: COMPLETED | OUTPATIENT
Start: 2022-12-22 | End: 2022-12-22

## 2022-12-22 RX ADMIN — SODIUM CHLORIDE: 9 INJECTION, SOLUTION INTRAVENOUS at 06:12

## 2022-12-22 RX ADMIN — OMEGA-3-ACID ETHYL ESTERS 2 G: 1 CAPSULE, LIQUID FILLED ORAL at 09:12

## 2022-12-22 RX ADMIN — SODIUM CHLORIDE 500 ML: 9 INJECTION, SOLUTION INTRAVENOUS at 02:12

## 2022-12-22 RX ADMIN — CARVEDILOL 12.5 MG: 12.5 TABLET, FILM COATED ORAL at 04:12

## 2022-12-22 RX ADMIN — CLOPIDOGREL BISULFATE 150 MG: 75 TABLET ORAL at 09:12

## 2022-12-22 RX ADMIN — ATORVASTATIN CALCIUM 80 MG: 40 TABLET, FILM COATED ORAL at 09:12

## 2022-12-22 RX ADMIN — ASPIRIN 325 MG ORAL TABLET 325 MG: 325 PILL ORAL at 09:12

## 2022-12-22 NOTE — PROGRESS NOTES
[facility] Observation [address] [place] [address] Medicine  Progress Note    Patient Name: Dariel Urbina  MRN: [id number]  Patient Class: OP- Observation   Admission Date: 12/21/2022  Length of Stay: 0 days  Attending Physician: Aurelia Perry MD  Primary Care Provider: Devon Langston MD        Subjective:     Principal Problem:Acute blood loss anemia        HPI:  Dariel Urbina is a 82 y.o. male with past medical history of CAD s/p CABG, PCI (11/2021), mechanical aortic valve (2014) on coumadin, HFmEF, CVA, CKD III, HTN, who presented to the ED with chest pain and epistaxis. Patient reports long-standing history of episodes of epistaxis and has had several cauterizations with ENT. Last admission for this issue was in October. Found to be supratherapeutic at warfarin clinic and was told to hold, so he has not taken it for the last 2 days.  This episode the bleeding began last night and has persisted this morning. He also c/o chronic recurrent exertional chest pain that is worse than his baseline. He says it is a central chest pressure that feels like indigestion. He endorses some lightheadedness and fatigue, as well as SOB which he says is baseline. He denies diaphoresis or nausea/ vomiting, fevers or chills, abdominal pain, N/V/D, blood in stool, blood in urine.    Patient hypertensive to 198/81 max on admit, otherwise VSS. AF. Hgb 7.8. . PT 38/ INR 4.0. Creatinine 2.5 (baseline ~1.9, though variable). . Trop 0.047 -> 0.052. EKG in NSR with 1st degree AV block. CXR stable. Rhinorocket placed in ED, though bleeding not controlled at time of my exam.         Overview/Hospital Course:  Mr. Ocasio was admitted to Hospital Medicine for management of epistaxis in the setting of a supratherapeutic INR.  He was evaluated by ENT and had rhino rockets placed.  He continued to bleed despite rhino rocket and Afrin.  IR was consulted for embolization.  Troponin continued to rise to 2.197 without chest pain.   Cardiology was consulted.  He was given 1 unit PRBCs for Hgb 6.9.      Interval History: Overnight with continued epistaxis despite rhino rocket and Afrin.  Troponin juani to 2.197 but unable to started Heparin for ACS given epistaxis and supratherapeutic INR.  Cardiology consulted.  IR consult placed for IR embolization given recurrent bleeding.  INR down to 3.4 today, goal 2-3.  Hgb returned at 6.9, will transfuse.  Wife at bedside.  All questions addressed.    Review of Systems   Constitutional:  Negative for chills, fatigue and fever.   HENT:  Positive for nosebleeds and postnasal drip.    Respiratory:  Negative for cough and shortness of breath.    Cardiovascular:  Negative for chest pain, palpitations and leg swelling.   Gastrointestinal:  Negative for abdominal pain, diarrhea, nausea and vomiting.   Genitourinary:  Negative for dysuria and urgency.   Neurological:  Negative for dizziness and headaches.   All other systems reviewed and are negative.  Objective:     Vital Signs (Most Recent):  Temp: 97.8 °F (36.6 °C) (12/22/22 1108)  Pulse: 74 (12/22/22 1108)  Resp: 17 (12/22/22 1108)  BP: (!) 128/58 (12/22/22 1108)  SpO2: 98 % (12/22/22 1108)   Vital Signs (24h Range):  Temp:  [97.8 °F (36.6 °C)-98.4 °F (36.9 °C)] 97.8 °F (36.6 °C)  Pulse:  [62-89] 74  Resp:  [12-22] 17  SpO2:  [91 %-100 %] 98 %  BP: (110-198)/(54-82) 128/58     Weight: 78 kg (171 lb 15.3 oz)  Body mass index is 28.62 kg/m².    Intake/Output Summary (Last 24 hours) at 12/22/2022 1113  Last data filed at 12/21/2022 1749  Gross per 24 hour   Intake --   Output 500 ml   Net -500 ml      Physical Exam  Constitutional:       Appearance: Normal appearance. He is well-developed.   HENT:      Head: Normocephalic and atraumatic.      Nose:      Comments: Bleeding from bilateral nares  Cardiovascular:      Rate and Rhythm: Normal rate and regular rhythm.      Heart sounds: No murmur heard.  Pulmonary:      Effort: Pulmonary effort is normal. No  respiratory distress.      Breath sounds: Normal breath sounds. No wheezing or rales.   Abdominal:      General: There is no distension.      Palpations: Abdomen is soft.      Tenderness: There is no abdominal tenderness.   Musculoskeletal:         General: No deformity.   Skin:     General: Skin is warm.   Neurological:      Mental Status: He is alert.       Significant Labs: All pertinent labs within the past 24 hours have been reviewed.  CBC:   Recent Labs   Lab 12/21/22  1034 12/21/22  1701 12/22/22  0856   WBC 5.81 6.67 7.44   HGB 7.8* 7.7* 6.9*   HCT 25.3* 24.4* 22.6*   * 137* 137*     CMP:   Recent Labs   Lab 12/21/22  1034 12/22/22  0856    135*   K 5.1 5.3*    108   CO2 20* 19*   GLU 99 105   BUN 62* 90*   CREATININE 2.5* 2.5*   CALCIUM 9.6 9.7   PROT 7.1 6.6   ALBUMIN 3.6 3.3*   BILITOT 0.6 0.6   ALKPHOS 55 50*   AST 16 20   ALT 15 13   ANIONGAP 10 8     Coagulation:   Recent Labs   Lab 12/22/22  0505   INR 3.4*       Significant Imaging: I have reviewed all pertinent imaging results/findings within the past 24 hours.      Assessment/Plan:      * Acute blood loss anemia  Recurrent Epistaxis   Thrombocytopenia  Long term AC   Supra therapeutic INR    · Bleeding persistent despite rhinorocket by ED and Afrin  · Mupirocin while packing in place   · ENT consulted, appreciate assistance   · IR consulted for embolization given recurrent nose bleeds  · INR down to 3.4, goal INR 2-3  · Hgb 6.9 today, baseline around 9 - will transfuse 1 unit PRBCs      Recurrent epistaxis  · See above      JIM (acute kidney injury)  · Creatinine 2.5 on admit, baseline around 1.9 - 2.5 again today  · Estimated Creatinine Clearance: 22.3 mL/min (A) (based on SCr of 2.5 mg/dL (H)).  · Strict I&Os  · Gentle IVF  · Avoid Nephrotoxic agents    Chest pain  · Chest pain in setting of acute bleeding and Hypertension. Exertional CP is chronic though pt reports this episode was worse than baseline.  · Trop 0.047 on admit  that peaked at 2.19  · BNP elevated, but below baseline. No acute HF.   · ECG in NSR with 1st degree AV block unchanged from baseline  · CXR without acute change   · Likely 2/2 anemia from epistaxis  · Cardiology consulted    Supratherapeutic INR  · See above      Acute on chronic heart failure  · Does not appear to be in acute HF  ·  (lower than baseline)  · CXR clear and does not appear to be volume overloaded on exam   · Likely volume down given acute bleed and JIM   · Holding bumex    Results for orders placed during the hospital encounter of 10/05/22    Echo    Interpretation Summary  · The left ventricle is mildly enlarged with eccentric hypertrophy and mildly decreased systolic function. The estimated ejection fraction is 45%.  · There is abnormal septal wall motion consistent with post-operative status.  · Moderate right ventricular enlargement with normal right ventricular systolic function.  · Left ventricular diastolic dysfunction.  · Biatrial enlargement.  · There is a mechanical aortic valve present. There is no aortic insufficiency present. Prosthetic aortic valve is normal.  · The aortic valve mean gradient is 13 mmHg with a dimensionless index of 0.36.  · Mild-to-moderate mitral regurgitation.  · Mild to moderate tricuspid regurgitation.  · The estimated PA systolic pressure is 42 mmHg.  · Normal central venous pressure (3 mmHg).        Thrombocytopenia  · See above  · Transfuse Plt <50 given active bleeding    Bilateral carotid artery disease  · Chronic and stable  · No acute issues    Stage 3b chronic kidney disease  BMP reviewed- noted Estimated Creatinine Clearance: 22.3 mL/min (A) (based on SCr of 2.5 mg/dL (H)). according to latest data. Monitor UOP and serial BMP and adjust therapy as needed. Renally dose meds.  Baseline ~1.9.   · Likely pre-renal from dehydration and volume losses in setting of persistent epistaxis  · If no improvement with conservative measures overnight can further  work up  · Consider urine lytes and renal ultrasound  · Strict I&Os  · Gentle IVF  · Avoid Nephrotoxic agents              H/O mechanical aortic valve replacement  Long term AC  · Holding AC for acute epistaxis and supratherapeutic INR  · Goal INR 2-3      History of gout  · Chronic and stable  · Continue allopurinol      Renovascular hypertension  - hypertensive on admit despite volume losses   - will continue coreg with caution and close monitoring; prn hydralazine PO  - hold lasix and lisinopril for JIM  - bleeding control as above       Hyperlipidemia associated with type 2 diabetes mellitus  · Chronic and stable  · Continue statin    Long term current use of anticoagulant therapy  · For mechanical AV      Coronary artery disease of bypass graft of native heart with stable angina pectoris  Carotid disease     · Continue statin   · See CP        VTE Risk Mitigation (From admission, onward)         Ordered     IP VTE HIGH RISK PATIENT  Once         12/21/22 1717     Place sequential compression device  Until discontinued         12/21/22 1717                Discharge Planning   ASTER: 12/23/2022     Code Status: Full Code   Is the patient medically ready for discharge?: No    Reason for patient still in hospital (select all that apply): Patient trending condition and Consult recommendations           Critical Care Time: 35 minutes    Critical Care was time spent personally by me on the following activities: evaluating this patient's organ dysfunction, development of treatment plan, discussing treatment plan with patient or surrogate and bedside caregivers, discussions with consultants, evaluation of patient's response to treatment, examination of patient, ordering and performing treatments and interventions, ordering and review of laboratory studies, ordering and review of radiographic studies, re-evaluation of patient's condition. This critical care time did not overlap with that of any other provider or involve  time for any separately billed procedures    Diagnosis requiring critical care: Anemia requiring transfusions            Aurelia Perry MD  Department of Hospital Medicine   [facility] - Observation 11H

## 2022-12-22 NOTE — CARE UPDATE
Dr. Colon notified of elevated Troponin and elevated INR. Patient is asymptomatic at this time however still experiencing Epistaxis after he removed packing from his nose. Very restless and unwilling to apply pressure to nose as demonstrated and instructed. Vitals have remained within normal limits and orientation without abnormality at this time. Will continue to monitor.

## 2022-12-22 NOTE — SUBJECTIVE & OBJECTIVE
Past Medical History:   Diagnosis Date    Anemia of chronic renal failure, stage 3 (moderate) 05/27/2015    Anticoagulant long-term use     Atherosclerosis of coronary artery bypass graft of native heart without angina pectoris 09/11/2012    3-27-18 [facility] Two vessel coronary artery disease.   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    Bilateral carotid artery disease 02/09/2017    Bleeding from the nose     Bleeding nose 03/21/2018    Cataract     CKD (chronic kidney disease) stage 3, GFR 30-59 ml/min 05/27/2015    Claudication of left lower extremity 09/17/2014    Colon polyp     Encounter for blood transfusion     Gastroesophageal reflux disease without esophagitis 03/19/2018    Gastrointestinal hemorrhage associated with intestinal diverticulosis 04/01/2018    Glaucoma     H/O mechanical aortic valve replacement 09/17/2014    History of gout 09/26/2012    Hyperparathyroidism due to renal insufficiency 07/27/2015    Internal hemorrhoid 04/03/2018    Long term current use of anticoagulant therapy 09/26/2012    Mechanical heart valve present     Metabolic acidosis with normal anion gap and bicarbonate losses 03/20/2018    Mixed hyperlipidemia 09/26/2012    NSTEMI (non-ST elevated myocardial infarction) 03/21/2018    Obesity, diabetes, and hypertension syndrome 02/23/2016    PVD (peripheral vascular disease) 09/11/2012    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    Syncope 09/29/2022    Type 2 diabetes mellitus with diabetic peripheral angiopathy without gangrene 05/27/2015    Type 2 diabetes mellitus with stage 3 chronic kidney disease, without long-term current use of insulin 10/02/2013       Past Surgical History:   Procedure Laterality Date    CARDIAC CATHETERIZATION      CARDIAC VALVE REPLACEMENT      CARDIAC VALVE SURGERY      CARPAL TUNNEL RELEASE Right 05/19/2020    Procedure: RELEASE, CARPAL TUNNEL;  Surgeon: Rupesh Norris Jr., MD;  Location: [facility];  Service: Plastics;  Laterality:  Right;    CATARACT EXTRACTION Left 11/13/2022        COLON SURGERY      COLONOSCOPY N/A 03/31/2017    Procedure: COLONOSCOPY;  Surgeon: Bruno Raymond MD;  Location: [facility] (4TH FLR);  Service: Endoscopy;  Laterality: N/A;  Patient's wife requesting date.    COLONOSCOPY N/A 04/03/2018    Procedure: COLONOSCOPY;  Surgeon: Bonifacio Pelletier MD;  Location: [facility] ENDO (2ND FLR);  Service: Endoscopy;  Laterality: N/A;    COLONOSCOPY N/A 08/13/2018    Procedure: COLONOSCOPY;  Surgeon: Kam Barba MD;  Location: [facility] ENDO (2ND FLR);  Service: Endoscopy;  Laterality: N/A;  2nd floor: PA pressure 49; hx of moderate-severe valve disease     per Coumadin clinic-Patient can hold 5 days with lovenox bridge       ok to schedule per Katarina    CORONARY ANGIOGRAPHY N/A 10/04/2021    Procedure: Left heart cath +/- peripheral angiogram;  Surgeon: Jose Ruiz MD;  Location: [facility] CATH LAB;  Service: Cardiology;  Laterality: N/A;    CORONARY ANGIOPLASTY      CORONARY ARTERY BYPASS GRAFT      CORONARY BYPASS GRAFT ANGIOGRAPHY  10/04/2021    Procedure: Bypass graft study;  Surgeon: Jose Ruiz MD;  Location: [facility] CATH LAB;  Service: Cardiology;;    HERNIA REPAIR      INTRAOCULAR PROSTHESES INSERTION Left 11/13/2022    Procedure: INSERTION, IOL PROSTHESIS;  Surgeon: Alia Mckeon MD;  Location: [facility] OR [address] [place] [address];  Service: Ophthalmology;  Laterality: Left;    INTRAOCULAR PROSTHESES INSERTION Right 12/4/2022    Procedure: INSERTION, IOL PROSTHESIS;  Surgeon: Alia Mckeon MD;  Location: [address];  Service: Ophthalmology;  Laterality: Right;    PHACOEMULSIFICATION OF CATARACT Left 11/13/2022    Procedure: PHACOEMULSIFICATION, CATARACT;  Surgeon: Alia Mckeon MD;  Location: [facility] OR [address] [place] [address];  Service: Ophthalmology;  Laterality: Left;    PHACOEMULSIFICATION OF CATARACT Right 12/4/2022    Procedure: PHACOEMULSIFICATION, CATARACT;  Surgeon: Alia Mckeon MD;  Location: [facility] OR [address] [place] [address];  Service:  Ophthalmology;  Laterality: Right;    SPINE SURGERY      VASECTOMY         Review of patient's allergies indicates:   Allergen Reactions    Fosinopril      Intolerance- elevates potassium level      Losartan      Intolerance- elevates potassium level       Current Facility-Administered Medications on File Prior to Encounter   Medication    ondansetron injection 4 mg    ondansetron injection 4 mg    phenylephrine HCL 2.5% ophthalmic solution 1 drop    phenylephrine HCL 2.5% ophthalmic solution 1 drop    proparacaine 0.5 % ophthalmic solution 1 drop    proparacaine 0.5 % ophthalmic solution 1 drop    tropicamide 1% ophthalmic solution 1 drop    tropicamide 1% ophthalmic solution 1 drop     Current Outpatient Medications on File Prior to Encounter   Medication Sig    allopurinoL (ZYLOPRIM) 100 MG tablet TAKE 1 TABLET EVERY DAY    bumetanide (BUMEX) 1 MG tablet Take 1 tablet (1 mg total) by mouth once daily.    carvediloL (COREG) 12.5 MG tablet Take 1 tablet (12.5 mg total) by mouth 2 (two) times daily with meals.    ferrous gluconate (FERGON) 324 MG tablet Take 1 tablet (324 mg total) by mouth daily with breakfast.    isosorbide mononitrate (IMDUR) 120 MG 24 hr tablet Take 1 tablet (120 mg total) by mouth once daily.    lactose-reduced food (ENSURE ORAL) Take 1-2 cans by mouth once daily    lisinopriL (PRINIVIL,ZESTRIL) 2.5 MG tablet Take 1 tablet (2.5 mg total) by mouth once daily.    loperamide (IMODIUM) 2 mg capsule Take 2 mg by mouth 4 (four) times daily as needed for Diarrhea.    omega-3 acid ethyl esters (LOVAZA) 1 gram capsule Take 2 capsules (2 g total) by mouth 2 (two) times daily.    prednisolon/gatiflox/bromfenac (PREDNISOL ACE-GATIFLOX-BROMFEN) 1-0.5-0.075 % DrpS Apply 1 drop to eye 3 (three) times daily. One drop 3 times a day in surgical eye    prednisolon/gatiflox/bromfenac (PREDNISOL ACE-GATIFLOX-BROMFEN) 1-0.5-0.075 % DrpS Apply 1 drop to eye 3 (three) times daily. One drop 3 times a day in surgical  eye    rosuvastatin (CRESTOR) 40 MG Tab TAKE 1 TABLET EVERY EVENING.    warfarin (COUMADIN) 5 MG tablet Take 1 tablet (5 mg) by mouth Monday, Tuesday, Wednesday, Friday & Saturday then 1.5 tablets (7.5mg) by mouth all other days (Thurs & Sun) as instructed by [facility] Clinic. (Patient taking differently: Take 1 tablet (5 mg) by mouth Monday&Friday then 1.5 tablets (7.5mg) by mouth all other days as instructed by [facility]mad[facility] Clinic.)     Family History       Problem Relation (Age of Onset)    Alcohol abuse Father    Diabetes Brother    Heart disease Mother, Father, Brother, Sister    Heart failure Mother, Father, Brother    No Known Problems Sister, Maternal Grandmother, Maternal Grandfather, Paternal Grandmother, Paternal Grandfather, Maternal Aunt, Maternal Uncle, Paternal Aunt, Paternal Uncle          Tobacco Use    Smoking status: Former     Packs/day: 1.00     Years: 20.00     Pack years: 20.00     Types: Cigarettes     Quit date: 1980     Years since quittin.3     Passive exposure: Never    Smokeless tobacco: Never   Substance and Sexual Activity    Alcohol use: No    Drug use: No    Sexual activity: Not Currently     Review of Systems   Constitutional: Negative for fever and malaise/fatigue.   Eyes:  Negative for blurred vision and pain.   Cardiovascular:  Negative for chest pain, dyspnea on exertion, leg swelling, orthopnea, palpitations and paroxysmal nocturnal dyspnea.   Respiratory:  Negative for cough, shortness of breath, sputum production and wheezing.    Hematologic/Lymphatic: Negative for adenopathy and bleeding problem.   Skin:  Negative for rash.   Musculoskeletal:  Negative for back pain and neck pain.   Gastrointestinal:  Negative for abdominal pain, constipation, diarrhea, nausea and vomiting.   Genitourinary:  Negative for dysuria.   Neurological:  Negative for dizziness, headaches, light-headedness and weakness.   Objective:     Vital Signs (Most Recent):  Temp: (!) 100.4 °F (38 °C)  (12/22/22 1521)  Pulse: 69 (12/22/22 1521)  Resp: 17 (12/22/22 1521)  BP: (!) 132/54 (12/22/22 1521)  SpO2: 96 % (12/22/22 1521)   Vital Signs (24h Range):  Temp:  [97.8 °F (36.6 °C)-100.4 °F (38 °C)] 100.4 °F (38 °C)  Pulse:  [65-89] 69  Resp:  [12-20] 17  SpO2:  [91 %-98 %] 96 %  BP: (110-151)/(54-70) 132/54     Weight: 78 kg (171 lb 15.3 oz)  Body mass index is 28.62 kg/m².    SpO2: 96 %         Intake/Output Summary (Last 24 hours) at 12/22/2022 1736  Last data filed at 12/21/2022 1749  Gross per 24 hour   Intake --   Output 500 ml   Net -500 ml       Lines/Drains/Airways       Peripheral Intravenous Line  Duration                  Peripheral IV - Single Lumen 12/21/22 1040 18 G Left Antecubital 1 day         Peripheral IV - Single Lumen 12/22/22 1015 20 G;1 3/4 in Right Forearm <1 day                    Physical Exam  Constitutional:       General: He is not in acute distress.     Appearance: Normal appearance. He is not ill-appearing, toxic-appearing or diaphoretic.   HENT:      Head: Normocephalic and atraumatic.      Nose: Nose normal.   Eyes:      Extraocular Movements: Extraocular movements intact.      Pupils: Pupils are equal, round, and reactive to light.   Cardiovascular:      Rate and Rhythm: Normal rate and regular rhythm.      Heart sounds: No murmur heard.    No friction rub. No gallop.   Pulmonary:      Effort: Pulmonary effort is normal. No respiratory distress.      Breath sounds: Normal breath sounds. No wheezing or rales.   Abdominal:      General: Abdomen is flat. There is no distension.      Palpations: Abdomen is soft. There is no mass.      Tenderness: There is no abdominal tenderness.   Musculoskeletal:         General: No swelling. Normal range of motion.      Cervical back: Normal range of motion. No rigidity.      Right lower leg: No edema.      Left lower leg: No edema.   Skin:     General: Skin is warm and dry.      Coloration: Skin is not jaundiced.      Findings: No bruising.    Neurological:      General: No focal deficit present.      Mental Status: He is alert and oriented to person, place, and time.      Cranial Nerves: No cranial nerve deficit.      Motor: No weakness.       Significant Labs: BMP:   Recent Labs   Lab 12/21/22  1034 12/22/22  0856   GLU 99 105    135*   K 5.1 5.3*    108   CO2 20* 19*   BUN 62* 90*   CREATININE 2.5* 2.5*   CALCIUM 9.6 9.7   MG  --  1.8    and CBC   Recent Labs   Lab 12/21/22  1034 12/21/22  1701 12/22/22  0856   WBC 5.81 6.67 7.44   HGB 7.8* 7.7* 6.9*   HCT 25.3* 24.4* 22.6*   * 137* 137*       Significant Imaging: Reviewed

## 2022-12-22 NOTE — PT/OT/SLP EVAL
Speech Language Pathology Evaluation  Bedside Swallow    Patient Name:  Dareil Urbina   MRN:  [id number]  Admitting Diagnosis: Acute blood loss anemia    Recommendations:                 General Recommendations:  check swallowing post NPO status  Diet recommendations:  Regular, Thin   Aspiration Precautions: 1 bite/sip at a time, Meds whole 1 at a time, Small bites/sips, and Strict aspiration precautions   General Precautions: Standard, fall  Communication strategies:  none    History:     Past Medical History:   Diagnosis Date    Anemia of chronic renal failure, stage 3 (moderate) 05/27/2015    Anticoagulant long-term use     Atherosclerosis of coronary artery bypass graft of native heart without angina pectoris 09/11/2012    3-27-18 [facility] Two vessel coronary artery disease.   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    Bilateral carotid artery disease 02/09/2017    Bleeding from the nose     Bleeding nose 03/21/2018    Cataract     CKD (chronic kidney disease) stage 3, GFR 30-59 ml/min 05/27/2015    Claudication of left lower extremity 09/17/2014    Colon polyp     Encounter for blood transfusion     Gastroesophageal reflux disease without esophagitis 03/19/2018    Gastrointestinal hemorrhage associated with intestinal diverticulosis 04/01/2018    Glaucoma     H/O mechanical aortic valve replacement 09/17/2014    History of gout 09/26/2012    Hyperparathyroidism due to renal insufficiency 07/27/2015    Internal hemorrhoid 04/03/2018    Long term current use of anticoagulant therapy 09/26/2012    Mechanical heart valve present     Metabolic acidosis with normal anion gap and bicarbonate losses 03/20/2018    Mixed hyperlipidemia 09/26/2012    NSTEMI (non-ST elevated myocardial infarction) 03/21/2018    Obesity, diabetes, and hypertension syndrome 02/23/2016    PVD (peripheral vascular disease) 09/11/2012    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    Syncope 09/29/2022    Type 2 diabetes  mellitus with diabetic peripheral angiopathy without gangrene 05/27/2015    Type 2 diabetes mellitus with stage 3 chronic kidney disease, without long-term current use of insulin 10/02/2013       Past Surgical History:   Procedure Laterality Date    CARDIAC CATHETERIZATION      CARDIAC VALVE REPLACEMENT      CARDIAC VALVE SURGERY      CARPAL TUNNEL RELEASE Right 05/19/2020    Procedure: RELEASE, CARPAL TUNNEL;  Surgeon: Rupesh Norris Jr., MD;  Location: [facility];  Service: Plastics;  Laterality: Right;    CATARACT EXTRACTION Left 11/13/2022        COLON SURGERY      COLONOSCOPY N/A 03/31/2017    Procedure: COLONOSCOPY;  Surgeon: Bruno Raymond MD;  Location: [facility] ENDO (4TH FLR);  Service: Endoscopy;  Laterality: N/A;  Patient's wife requesting date.    COLONOSCOPY N/A 04/03/2018    Procedure: COLONOSCOPY;  Surgeon: Bonifacio Pelletier MD;  Location: [facility] ENDO (2ND FLR);  Service: Endoscopy;  Laterality: N/A;    COLONOSCOPY N/A 08/13/2018    Procedure: COLONOSCOPY;  Surgeon: Kam Barba MD;  Location: [facility] ENDO (2ND FLR);  Service: Endoscopy;  Laterality: N/A;  2nd floor: PA pressure 49; hx of moderate-severe valve disease     per Coumadin clinic-Patient can hold 5 days with lovenox bridge       ok to schedule per Katarina    CORONARY ANGIOGRAPHY N/A 10/04/2021    Procedure: Left heart cath +/- peripheral angiogram;  Surgeon: Jose Ruiz MD;  Location: [facility] CATH LAB;  Service: Cardiology;  Laterality: N/A;    CORONARY ANGIOPLASTY      CORONARY ARTERY BYPASS GRAFT      CORONARY BYPASS GRAFT ANGIOGRAPHY  10/04/2021    Procedure: Bypass graft study;  Surgeon: Jose Ruiz MD;  Location: [facility] CATH LAB;  Service: Cardiology;;    HERNIA REPAIR      INTRAOCULAR PROSTHESES INSERTION Left 11/13/2022    Procedure: INSERTION, IOL PROSTHESIS;  Surgeon: Alia Mckeon MD;  Location: [facility] 1ST FLR;  Service: Ophthalmology;  Laterality: Left;    INTRAOCULAR PROSTHESES INSERTION Right 12/4/2022     "Procedure: INSERTION, IOL PROSTHESIS;  Surgeon: Alia Mckeon MD;  Location: [address];  Service: Ophthalmology;  Laterality: Right;    PHACOEMULSIFICATION OF CATARACT Left 11/13/2022    Procedure: PHACOEMULSIFICATION, CATARACT;  Surgeon: Alia Mckeon MD;  Location: [address];  Service: Ophthalmology;  Laterality: Left;    PHACOEMULSIFICATION OF CATARACT Right 12/4/2022    Procedure: PHACOEMULSIFICATION, CATARACT;  Surgeon: Alia Mckeon MD;  Location: [facility] OR [address] [place] [address];  Service: Ophthalmology;  Laterality: Right;    SPINE SURGERY      VASECTOMY         Chest X-Rays: 12/21 Postoperative changes as before.  Mild cardiomegaly.  The lungs are clear.  No pleural effusion.     Prior diet: regular. Pt reports that he sometimes has difficulty with liquids stating "my throat closes up & I have to spit it out." Pt states that this happens sometimes 2-3x a week & sometimes it doesn't happen as often. He reports it helps if he slows down his rate of intake.     Subjective   Awake, alert. "I'm tired."     After having thin liquid trial, pt reported that he is having a procedure & not supposed to have anything by mouth. SLP was unable to reach NS by phone to confirm so only gave pt sips of water.     Pain/Comfort:  Pain Rating 1: 0/10  Pain Rating Post-Intervention 2: 0/10    Respiratory Status: Room air    Objective:     Oral Musculature Evaluation  Oral Musculature: WFL  Dentition: scattered dentition  Oral Labial Strength and Mobility: WFL  Lingual Strength and Mobility: WFL  Volitional Cough: adequate  Volitional Swallow: adequate  Voice Prior to PO Intake: clear    Bedside Swallow Eval:   Consistencies Assessed:  Thin liquids cup sips of thin , 3 large consecutive sips  PO trials limited as pt reports he is having a procedure (SLP unable to confirm)    Oral Phase:   WFL    Pharyngeal Phase:   no overt clinical signs/symptoms of aspiration      Assessment:     Dariel Urbina is a 82 y.o. male " with oropharyngeal swallow appears WFL. SLP recs ST follow up to ensure diet tolerance when pt is clear to accept all PO.     Goals:   Multidisciplinary Problems       SLP Goals          Problem: SLP    Goal Priority Disciplines Outcome   SLP Goal     SLP Ongoing, Progressing   Description: Speech Language Pathology Goals  Goals expected to be met by 12/29  1. Pt will tolerated regular diet & thin liquids without s/s aspiration                       Plan:     Patient to be seen:  3 x/week   Plan of Care expires:  01/20/23  Plan of Care reviewed with:  patient   SLP Follow-Up:  Yes       Discharge recommendations:   (tbd)     Time Tracking:     SLP Treatment Date:   12/22/22  Speech Start Time:  1026  Speech Stop Time:  1040     Speech Total Time (min):  14 min    Billable Minutes: Eval Swallow and Oral Function 14    12/22/2022

## 2022-12-22 NOTE — PROGRESS NOTES
PHARMACY CONSULT NOTE: WARFARIN  Dariel Urbina is a 82 y.o. male on warfarin therapy for Mechanical Heart Valve (aortic). PharmD has been consulted for warfarin dosing. Of note, patient admitted with Epistaxis.     Current order: held  Home dose: 5 mg every Mon, Fri; 7.5 mg all other days  Coumadin clinic enrollment: Active  INR goal: 2-3    Lab Results   Component Value Date    INR 3.4 (H) 12/22/2022    INR 4.0 (H) 12/21/2022    INR 6.6 (HH) 12/19/2022     Diet: Adult Cardiac with Boost supplements     Recommendation(s):   INR trending down, but patient still with epistaxis. Will continue to hold warfarin for today.   INR daily (ordered)  Pharmacy will continue to follow and monitor warfarin    Thank you for the consult,  Amna Salgado, PharmD, BCPS  [id number]    **Note: Consults are reviewed Monday-Friday 7:00am-3:30pm. THE ABOVE RECOMMENDATIONS ARE ONLY SUGGESTED.THE RECOMMENDATIONS SHOULD BE CONSIDERED IN CONJUNCTION WITH ALL PATIENT FACTORS. **

## 2022-12-22 NOTE — ASSESSMENT & PLAN NOTE
· Does not appear to be in acute HF  ·  (lower than baseline)  · CXR clear and does not appear to be volume overloaded on exam   · Likely volume down given acute bleed and JIM   · Holding bumex    Results for orders placed during the hospital encounter of 10/05/22    Echo    Interpretation Summary  · The left ventricle is mildly enlarged with eccentric hypertrophy and mildly decreased systolic function. The estimated ejection fraction is 45%.  · There is abnormal septal wall motion consistent with post-operative status.  · Moderate right ventricular enlargement with normal right ventricular systolic function.  · Left ventricular diastolic dysfunction.  · Biatrial enlargement.  · There is a mechanical aortic valve present. There is no aortic insufficiency present. Prosthetic aortic valve is normal.  · The aortic valve mean gradient is 13 mmHg with a dimensionless index of 0.36.  · Mild-to-moderate mitral regurgitation.  · Mild to moderate tricuspid regurgitation.  · The estimated PA systolic pressure is 42 mmHg.  · Normal central venous pressure (3 mmHg).

## 2022-12-22 NOTE — ASSESSMENT & PLAN NOTE
· Chest pain in setting of acute bleeding and Hypertension. Exertional CP is chronic though pt reports this episode was worse than baseline.  · Trop 0.047 on admit that peaked at 2.19  · BNP elevated, but below baseline. No acute HF.   · ECG in NSR with 1st degree AV block unchanged from baseline  · CXR without acute change   · Likely 2/2 anemia from epistaxis  · Cardiology consulted

## 2022-12-22 NOTE — CONSULTS
[facility]  Cardiology  Consult Note    Patient Name: Dariel Urbina  MRN: [id number]  Admission Date: 12/21/2022  Hospital Length of Stay: 0 days  Code Status: Full Code   Attending Provider: Aurelia Perry MD   Consulting Provider: Dany Cortez MD  Primary Care Physician: Deovn Langston MD  Principal Problem:Acute blood loss anemia    Patient information was obtained from patient and ER records.     Inpatient consult to Cardiology  Consult performed by: Dany Cortez MD  Consult ordered by: Atif Colon MD        Subjective:     Chief Complaint:  NSTEMI     HPI:   Dariel Urbina is a 82 y.o. male with past medical history of CAD s/p CABG, PCI (11/2021 with SILVIO to ostial LCX and prox LCX. Ost LAD lesion 80% stenosed, patent LIMA to LAD), mechanical aortic valve (2014) on coumadin, HFmEF, CVA, CKD III, HTN, who presented to the ED with chest pain and epistaxis. Patient reports long-standing history of episodes of epistaxis and has had several cauterizations with ENT. Last admission for this issue was in October. Found to be supratherapeutic at warfarin clinic and was told to hold, so he has not taken it for the last 2 days.  This episode the bleeding began 12/20/22 and has persisted this morning. He also c/o chronic recurrent exertional chest pain that is worse than his baseline. He says it is a central chest pressure that feels like his chest is being squeezed when he exerts himself.      AF. MERT in 110s-190s. Hgb 7.8 from 9.8. Creatinine 2.5 (baseline ~1.9, though variable). . Trop 0.047 -> 0.052. Troponin continued to rise to 2.197 without chest pain. ECG without St changes.      He was evaluated by ENT and had rhino rockets placed.  He continued to bleed despite rhino rocket and Afrin. He was given 1 unit PRBCs for Hgb 6.9. IR consult placed for IR embolization given recurrent bleeding.  INR down to 3.4 today, goal 2-3.  Hgb returned at 6.9 with plans to transfuse     10/5/22  TTE    The left ventricle is mildly enlarged with eccentric hypertrophy and mildly decreased systolic function. The estimated ejection fraction is 45%.   There is abnormal septal wall motion consistent with post-operative status.   Moderate right ventricular enlargement with normal right ventricular systolic function.   Left ventricular diastolic dysfunction.   Biatrial enlargement.   There is a mechanical aortic valve present. There is no aortic insufficiency present. Prosthetic aortic valve is normal.   The aortic valve mean gradient is 13 mmHg with a dimensionless index of 0.36.   Mild-to-moderate mitral regurgitation.   Mild to moderate tricuspid regurgitation.   The estimated PA systolic pressure is 42 mmHg.   Normal central venous pressure (3 mmHg).        Past Medical History:   Diagnosis Date    Anemia of chronic renal failure, stage 3 (moderate) 05/27/2015    Anticoagulant long-term use     Atherosclerosis of coronary artery bypass graft of native heart without angina pectoris 09/11/2012    3-27-18 [facility] Two vessel coronary artery disease.   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    Bilateral carotid artery disease 02/09/2017    Bleeding from the nose     Bleeding nose 03/21/2018    Cataract     CKD (chronic kidney disease) stage 3, GFR 30-59 ml/min 05/27/2015    Claudication of left lower extremity 09/17/2014    Colon polyp     Encounter for blood transfusion     Gastroesophageal reflux disease without esophagitis 03/19/2018    Gastrointestinal hemorrhage associated with intestinal diverticulosis 04/01/2018    Glaucoma     H/O mechanical aortic valve replacement 09/17/2014    History of gout 09/26/2012    Hyperparathyroidism due to renal insufficiency 07/27/2015    Internal hemorrhoid 04/03/2018    Long term current use of anticoagulant therapy 09/26/2012    Mechanical heart valve present     Metabolic acidosis with normal anion gap and bicarbonate losses 03/20/2018    Mixed  hyperlipidemia 09/26/2012    NSTEMI (non-ST elevated myocardial infarction) 03/21/2018    Obesity, diabetes, and hypertension syndrome 02/23/2016    PVD (peripheral vascular disease) 09/11/2012    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    Syncope 09/29/2022    Type 2 diabetes mellitus with diabetic peripheral angiopathy without gangrene 05/27/2015    Type 2 diabetes mellitus with stage 3 chronic kidney disease, without long-term current use of insulin 10/02/2013       Past Surgical History:   Procedure Laterality Date    CARDIAC CATHETERIZATION      CARDIAC VALVE REPLACEMENT      CARDIAC VALVE SURGERY      CARPAL TUNNEL RELEASE Right 05/19/2020    Procedure: RELEASE, CARPAL TUNNEL;  Surgeon: Rupesh Norris Jr., MD;  Location: [facility];  Service: Plastics;  Laterality: Right;    CATARACT EXTRACTION Left 11/13/2022        COLON SURGERY      COLONOSCOPY N/A 03/31/2017    Procedure: COLONOSCOPY;  Surgeon: Bruno Raymond MD;  Location: [facility] (4TH FLR);  Service: Endoscopy;  Laterality: N/A;  Patient's wife requesting date.    COLONOSCOPY N/A 04/03/2018    Procedure: COLONOSCOPY;  Surgeon: Bonifacio Pelletier MD;  Location: [facility] (2ND FLR);  Service: Endoscopy;  Laterality: N/A;    COLONOSCOPY N/A 08/13/2018    Procedure: COLONOSCOPY;  Surgeon: Kam Barba MD;  Location: [facility] (2ND FLR);  Service: Endoscopy;  Laterality: N/A;  2nd floor: PA pressure 49; hx of moderate-severe valve disease     per Coumadin clinic-Patient can hold 5 days with lovenox bridge       ok to schedule per Katarina    CORONARY ANGIOGRAPHY N/A 10/04/2021    Procedure: Left heart cath +/- peripheral angiogram;  Surgeon: Jose Ruiz MD;  Location: [facility] CATH LAB;  Service: Cardiology;  Laterality: N/A;    CORONARY ANGIOPLASTY      CORONARY ARTERY BYPASS GRAFT      CORONARY BYPASS GRAFT ANGIOGRAPHY  10/04/2021    Procedure: Bypass graft study;  Surgeon: Jose Ruiz MD;  Location: [facility] CATH LAB;  Service: Cardiology;;     HERNIA REPAIR      INTRAOCULAR PROSTHESES INSERTION Left 11/13/2022    Procedure: INSERTION, IOL PROSTHESIS;  Surgeon: Alia Mckeon MD;  Location: [facility] OR [address] [place] [address];  Service: Ophthalmology;  Laterality: Left;    INTRAOCULAR PROSTHESES INSERTION Right 12/4/2022    Procedure: INSERTION, IOL PROSTHESIS;  Surgeon: Alia Mckeon MD;  Location: [facility] OR [address] [place] [address];  Service: Ophthalmology;  Laterality: Right;    PHACOEMULSIFICATION OF CATARACT Left 11/13/2022    Procedure: PHACOEMULSIFICATION, CATARACT;  Surgeon: Alia Mckeon MD;  Location: [facility] OR [address] [place] [address];  Service: Ophthalmology;  Laterality: Left;    PHACOEMULSIFICATION OF CATARACT Right 12/4/2022    Procedure: PHACOEMULSIFICATION, CATARACT;  Surgeon: Alia Mckeon MD;  Location: [facility] OR [address] [place] [address];  Service: Ophthalmology;  Laterality: Right;    SPINE SURGERY      VASECTOMY         Review of patient's allergies indicates:   Allergen Reactions    Fosinopril      Intolerance- elevates potassium level      Losartan      Intolerance- elevates potassium level       Current Facility-Administered Medications on File Prior to Encounter   Medication    ondansetron injection 4 mg    ondansetron injection 4 mg    phenylephrine HCL 2.5% ophthalmic solution 1 drop    phenylephrine HCL 2.5% ophthalmic solution 1 drop    proparacaine 0.5 % ophthalmic solution 1 drop    proparacaine 0.5 % ophthalmic solution 1 drop    tropicamide 1% ophthalmic solution 1 drop    tropicamide 1% ophthalmic solution 1 drop     Current Outpatient Medications on File Prior to Encounter   Medication Sig    allopurinoL (ZYLOPRIM) 100 MG tablet TAKE 1 TABLET EVERY DAY    bumetanide (BUMEX) 1 MG tablet Take 1 tablet (1 mg total) by mouth once daily.    carvediloL (COREG) 12.5 MG tablet Take 1 tablet (12.5 mg total) by mouth 2 (two) times daily with meals.    ferrous gluconate (FERGON) 324 MG tablet Take 1 tablet (324 mg total) by mouth daily with breakfast.    isosorbide mononitrate (IMDUR) 120 MG  24 hr tablet Take 1 tablet (120 mg total) by mouth once daily.    lactose-reduced food (ENSURE ORAL) Take 1-2 cans by mouth once daily    lisinopriL (PRINIVIL,ZESTRIL) 2.5 MG tablet Take 1 tablet (2.5 mg total) by mouth once daily.    loperamide (IMODIUM) 2 mg capsule Take 2 mg by mouth 4 (four) times daily as needed for Diarrhea.    omega-3 acid ethyl esters (LOVAZA) 1 gram capsule Take 2 capsules (2 g total) by mouth 2 (two) times daily.    prednisolon/gatiflox/bromfenac (PREDNISOL ACE-GATIFLOX-BROMFEN) 1-0.5-0.075 % DrpS Apply 1 drop to eye 3 (three) times daily. One drop 3 times a day in surgical eye    prednisolon/gatiflox/bromfenac (PREDNISOL ACE-GATIFLOX-BROMFEN) 1-0.5-0.075 % DrpS Apply 1 drop to eye 3 (three) times daily. One drop 3 times a day in surgical eye    rosuvastatin (CRESTOR) 40 MG Tab TAKE 1 TABLET EVERY EVENING.    warfarin (COUMADIN) 5 MG tablet Take 1 tablet (5 mg) by mouth Monday, Tuesday, Wednesday, Friday & Saturday then 1.5 tablets (7.5mg) by mouth all other days (Thurs & Sun) as instructed by Coumadin Clinic. (Patient taking differently: Take 1 tablet (5 mg) by mouth Monday&Friday then 1.5 tablets (7.5mg) by mouth all other days as instructed by Coumadin Clinic.)     Family History       Problem Relation (Age of Onset)    Alcohol abuse Father    Diabetes Brother    Heart disease Mother, Father, Brother, Sister    Heart failure Mother, Father, Brother    No Known Problems Sister, Maternal Grandmother, Maternal Grandfather, Paternal Grandmother, Paternal Grandfather, Maternal Aunt, Maternal Uncle, Paternal Aunt, Paternal Uncle          Tobacco Use    Smoking status: Former     Packs/day: 1.00     Years: 20.00     Pack years: 20.00     Types: Cigarettes     Quit date: 1980     Years since quittin.3     Passive exposure: Never    Smokeless tobacco: Never   Substance and Sexual Activity    Alcohol use: No    Drug use: No    Sexual activity: Not Currently     Review of Systems    Constitutional: Negative for fever and malaise/fatigue.   Eyes:  Negative for blurred vision and pain.   Cardiovascular:  Negative for chest pain, dyspnea on exertion, leg swelling, orthopnea, palpitations and paroxysmal nocturnal dyspnea.   Respiratory:  Negative for cough, shortness of breath, sputum production and wheezing.    Hematologic/Lymphatic: Negative for adenopathy and bleeding problem.   Skin:  Negative for rash.   Musculoskeletal:  Negative for back pain and neck pain.   Gastrointestinal:  Negative for abdominal pain, constipation, diarrhea, nausea and vomiting.   Genitourinary:  Negative for dysuria.   Neurological:  Negative for dizziness, headaches, light-headedness and weakness.   Objective:     Vital Signs (Most Recent):  Temp: (!) 100.4 °F (38 °C) (12/22/22 1521)  Pulse: 69 (12/22/22 1521)  Resp: 17 (12/22/22 1521)  BP: (!) 132/54 (12/22/22 1521)  SpO2: 96 % (12/22/22 1521)   Vital Signs (24h Range):  Temp:  [97.8 °F (36.6 °C)-100.4 °F (38 °C)] 100.4 °F (38 °C)  Pulse:  [65-89] 69  Resp:  [12-20] 17  SpO2:  [91 %-98 %] 96 %  BP: (110-151)/(54-70) 132/54     Weight: 78 kg (171 lb 15.3 oz)  Body mass index is 28.62 kg/m².    SpO2: 96 %         Intake/Output Summary (Last 24 hours) at 12/22/2022 1736  Last data filed at 12/21/2022 1749  Gross per 24 hour   Intake --   Output 500 ml   Net -500 ml       Lines/Drains/Airways       Peripheral Intravenous Line  Duration                  Peripheral IV - Single Lumen 12/21/22 1040 18 G Left Antecubital 1 day         Peripheral IV - Single Lumen 12/22/22 1015 20 G;1 3/4 in Right Forearm <1 day                    Physical Exam  Constitutional:       General: He is not in acute distress.     Appearance: Normal appearance. He is not ill-appearing, toxic-appearing or diaphoretic.   HENT:      Head: Normocephalic and atraumatic.      Nose: Nose normal.   Eyes:      Extraocular Movements: Extraocular movements intact.      Pupils: Pupils are equal, round, and  reactive to light.   Cardiovascular:      Rate and Rhythm: Normal rate and regular rhythm.      Heart sounds: No murmur heard.    No friction rub. No gallop.   Pulmonary:      Effort: Pulmonary effort is normal. No respiratory distress.      Breath sounds: Normal breath sounds. No wheezing or rales.   Abdominal:      General: Abdomen is flat. There is no distension.      Palpations: Abdomen is soft. There is no mass.      Tenderness: There is no abdominal tenderness.   Musculoskeletal:         General: No swelling. Normal range of motion.      Cervical back: Normal range of motion. No rigidity.      Right lower leg: No edema.      Left lower leg: No edema.   Skin:     General: Skin is warm and dry.      Coloration: Skin is not jaundiced.      Findings: No bruising.   Neurological:      General: No focal deficit present.      Mental Status: He is alert and oriented to person, place, and time.      Cranial Nerves: No cranial nerve deficit.      Motor: No weakness.       Significant Labs: BMP:   Recent Labs   Lab 12/21/22  1034 12/22/22  0856   GLU 99 105    135*   K 5.1 5.3*    108   CO2 20* 19*   BUN 62* 90*   CREATININE 2.5* 2.5*   CALCIUM 9.6 9.7   MG  --  1.8    and CBC   Recent Labs   Lab 12/21/22  1034 12/21/22  1701 12/22/22  0856   WBC 5.81 6.67 7.44   HGB 7.8* 7.7* 6.9*   HCT 25.3* 24.4* 22.6*   * 137* 137*       Significant Imaging: Reviewed     Assessment and Plan:     NSTEMI (non-ST elevated myocardial infarction)  Pt with chest squeezing with exertion. Trop elevated to 2. Concern for ACS at this time although picture complicated by epistaxis     -Would start  mg and Plavix 600 mg  as tolerated from an ENT standpoint   -Continue pt on ASA 81 mg daily and Plavix 75 mg daily   -Pt will need angiogram prior to discharge; Plan to consult general IC in AM   -Recommend HI statin, BB   -Repeat ECHO   -Continue to trend trops and ECG   -Please call Cardiology with un-resolving chest pain  or clinical changes      H/O mechanical aortic valve replacement    -Would recommend heparin gtt when INR <3         VTE Risk Mitigation (From admission, onward)           Ordered     IP VTE HIGH RISK PATIENT  Once         12/21/22 1717     Place sequential compression device  Until discontinued         12/21/22 1717                    Thank you for your consult. I will follow-up with patient. Please contact us if you have any additional questions.    Dany Cortez MD  Cardiology   [facility]

## 2022-12-22 NOTE — HOSPITAL COURSE
Mr. Ocasio was admitted to Hospital Medicine for management of epistaxis in the setting of a supratherapeutic INR.  He was evaluated by ENT and had rhino rockets placed.  He continued to bleed despite rhino rocket and Afrin.  IR was consulted for embolization, but then bleeding resolved so IR felt like embolization was not needed.  He was given 1 unit PRBCs for Hgb 6.9 and INR normalized.  Troponin continued to rise to 2.197 without chest pain.  Cardiology was consulted.  They suggested full dose Aspirin and Plavix and home Warfarin for an NSTEMI, and Interventional Cardiology consult.  IC suggested medical management given renal function and goal Hgb >8.  Coreg was changed to Metoprolol and he was started on Imdur.  Cardiology wants outpatient cardiac pet and follow up outpatient.  He did have black stools on the morning of 12/23, but no further epistaxis - it seems like he swallowed blood during his epistaxis, so will hold on GI consult for now.

## 2022-12-22 NOTE — ASSESSMENT & PLAN NOTE
· Creatinine 2.5 on admit, baseline around 1.9 - 2.5 again today  · Estimated Creatinine Clearance: 22.3 mL/min (A) (based on SCr of 2.5 mg/dL (H)).  · Strict I&Os  · Gentle IVF  · Avoid Nephrotoxic agents

## 2022-12-22 NOTE — PLAN OF CARE
Patient with further elevation of troponin, no active chest pain, supratherapeutic INR and still with epistaxis. Cardiology called, at this point with risk of bleeding, low CBC, can not start heparin, but does have significant coronary disease, will consult cardiology, appreciate princesss

## 2022-12-22 NOTE — PLAN OF CARE
[facility]  Discharge Assessment    Primary Care Provider: Devon Langston MD     Discharge Assessment (most recent)       BRIEF DISCHARGE ASSESSMENT - 12/22/22 6318          Discharge Planning    Assessment Type Discharge Planning Brief Assessment     Resource/Environmental Concerns none     Support Systems Spouse/significant other     Equipment Currently Used at Home none     Current Living Arrangements home     Patient/Family Anticipates Transition to home     Patient/Family Anticipated Services at Transition none     DME Needed Upon Discharge  none     Discharge Plan A Home     Discharge Plan B Home        Physical Activity    On average, how many days per week do you engage in moderate to strenuous exercise (like a brisk walk)? 0 days     On average, how many minutes do you engage in exercise at this level? 0 min        Financial Resource Strain    How hard is it for you to pay for the very basics like food, housing, medical care, and heating? Not hard at all        Housing Stability    In the last 12 months, was there a time when you were not able to pay the mortgage or rent on time? No     In the last 12 months, how many places have you lived? 1     In the last 12 months, was there a time when you did not have a steady place to sleep or slept in a shelter (including now)? No        Transportation Needs    In the past 12 months, has lack of transportation kept you from medical appointments or from getting medications? No        Food Insecurity    Within the past 12 months, you worried that your food would run out before you got the money to buy more. Never true     Within the past 12 months, the food you bought just didn't last and you didn't have money to get more. Never true        Stress    Do you feel stress - tense, restless, nervous, or anxious, or unable to sleep at night because your mind is troubled all the time - these days? Not at all        Social Connections    In a typical week,  how many times do you talk on the phone with family, friends, or neighbors? Once a week     How often do you get together with friends or relatives? More than three times a week     How often do you attend [employer]ism or [employer] services? Never     Do you belong to any clubs or organizations such as Adventism groups, unions, fraternal or athletic groups, or school groups? Yes     How often do you attend meetings of the clubs or organizations you belong to? More than 4 times per year     Are you , , , , never , or living with a partner?         Alcohol Use    Q1: How often do you have a drink containing alcohol? Never     Q2: How many drinks containing alcohol do you have on a typical day when you are drinking? Patient does not drink     Q3: How often do you have six or more drinks on one occasion? Never                   Pt is a 82 y.o. female admitted with acute blood loss anemia. He has a PMH of CAD s/p CABG, Mechanical Aortic Valve on coumadin and has his INR monitored at the [facility]. He lives with his wife in a single story house with no steps to enter. He has not required DME in the past and is independent with his ADLs and IADLs and still drives. [facility] Dis[facility] Packet given to patient and/or family with understanding verbalized.   name and number and estimated discharge date written on white board in patient's room with request to call for any questions or concerns.  Will continue to follow for needs.  Kam Velasquez RN,BSN

## 2022-12-22 NOTE — SUBJECTIVE & OBJECTIVE
Interval History: Overnight with continued epistaxis despite rhino rocket and Afrin.  Troponin juani to 2.197 but unable to started Heparin for ACS given epistaxis and supratherapeutic INR.  Cardiology consulted.  IR consult placed for IR embolization given recurrent bleeding.  INR down to 3.4 today, goal 2-3.  Hgb returned at 6.9, will transfuse.  Wife at bedside.  All questions addressed.    Review of Systems   Constitutional:  Negative for chills, fatigue and fever.   HENT:  Positive for nosebleeds and postnasal drip.    Respiratory:  Negative for cough and shortness of breath.    Cardiovascular:  Negative for chest pain, palpitations and leg swelling.   Gastrointestinal:  Negative for abdominal pain, diarrhea, nausea and vomiting.   Genitourinary:  Negative for dysuria and urgency.   Neurological:  Negative for dizziness and headaches.   All other systems reviewed and are negative.  Objective:     Vital Signs (Most Recent):  Temp: 97.8 °F (36.6 °C) (12/22/22 1108)  Pulse: 74 (12/22/22 1108)  Resp: 17 (12/22/22 1108)  BP: (!) 128/58 (12/22/22 1108)  SpO2: 98 % (12/22/22 1108)   Vital Signs (24h Range):  Temp:  [97.8 °F (36.6 °C)-98.4 °F (36.9 °C)] 97.8 °F (36.6 °C)  Pulse:  [62-89] 74  Resp:  [12-22] 17  SpO2:  [91 %-100 %] 98 %  BP: (110-198)/(54-82) 128/58     Weight: 78 kg (171 lb 15.3 oz)  Body mass index is 28.62 kg/m².    Intake/Output Summary (Last 24 hours) at 12/22/2022 1113  Last data filed at 12/21/2022 1749  Gross per 24 hour   Intake --   Output 500 ml   Net -500 ml      Physical Exam  Constitutional:       Appearance: Normal appearance. He is well-developed.   HENT:      Head: Normocephalic and atraumatic.      Nose:      Comments: Bleeding from bilateral nares  Cardiovascular:      Rate and Rhythm: Normal rate and regular rhythm.      Heart sounds: No murmur heard.  Pulmonary:      Effort: Pulmonary effort is normal. No respiratory distress.      Breath sounds: Normal breath sounds. No wheezing or  rales.   Abdominal:      General: There is no distension.      Palpations: Abdomen is soft.      Tenderness: There is no abdominal tenderness.   Musculoskeletal:         General: No deformity.   Skin:     General: Skin is warm.   Neurological:      Mental Status: He is alert.       Significant Labs: All pertinent labs within the past 24 hours have been reviewed.  CBC:   Recent Labs   Lab 12/21/22  1034 12/21/22  1701 12/22/22  0856   WBC 5.81 6.67 7.44   HGB 7.8* 7.7* 6.9*   HCT 25.3* 24.4* 22.6*   * 137* 137*     CMP:   Recent Labs   Lab 12/21/22  1034 12/22/22  0856    135*   K 5.1 5.3*    108   CO2 20* 19*   GLU 99 105   BUN 62* 90*   CREATININE 2.5* 2.5*   CALCIUM 9.6 9.7   PROT 7.1 6.6   ALBUMIN 3.6 3.3*   BILITOT 0.6 0.6   ALKPHOS 55 50*   AST 16 20   ALT 15 13   ANIONGAP 10 8     Coagulation:   Recent Labs   Lab 12/22/22  0505   INR 3.4*       Significant Imaging: I have reviewed all pertinent imaging results/findings within the past 24 hours.

## 2022-12-22 NOTE — HPI
Dariel Urbina is a 82 y.o. male with past medical history of CAD s/p CABG, PCI (11/2021 with SILVIO to ostial LCX and prox LCX. Ost LAD lesion 80% stenosed, patent LIMA to LAD), mechanical aortic valve (2014) on coumadin, HFmEF, CVA, CKD III, HTN, who presented to the ED with chest pain and epistaxis. Patient reports long-standing history of episodes of epistaxis and has had several cauterizations with ENT. Last admission for this issue was in October. Found to be supratherapeutic at warfarin clinic and was told to hold, so he has not taken it for the last 2 days.  This episode the bleeding began 12/20/22 and has persisted this morning. He also c/o chronic recurrent exertional chest pain that is worse than his baseline. He says it is a central chest pressure that feels like his chest is being squeezed when he exerts himself.      AF. MERT in 110s-190s. Hgb 7.8 from 9.8. Creatinine 2.5 (baseline ~1.9, though variable). . Trop 0.047 -> 0.052. Troponin continued to rise to 2.197 without chest pain. ECG without St changes.      He was evaluated by ENT and had rhino rockets placed.  He continued to bleed despite rhino rocket and Afrin. He was given 1 unit PRBCs for Hgb 6.9. IR consult placed for IR embolization given recurrent bleeding.  INR down to 3.4 today, goal 2-3.  Hgb returned at 6.9 with plans to transfuse     10/5/22 TTE    The left ventricle is mildly enlarged with eccentric hypertrophy and mildly decreased systolic function. The estimated ejection fraction is 45%.   There is abnormal septal wall motion consistent with post-operative status.   Moderate right ventricular enlargement with normal right ventricular systolic function.   Left ventricular diastolic dysfunction.   Biatrial enlargement.   There is a mechanical aortic valve present. There is no aortic insufficiency present. Prosthetic aortic valve is normal.   The aortic valve mean gradient is 13 mmHg with a dimensionless index of  0.36.   Mild-to-moderate mitral regurgitation.   Mild to moderate tricuspid regurgitation.   The estimated PA systolic pressure is 42 mmHg.   Normal central venous pressure (3 mmHg).

## 2022-12-22 NOTE — PLAN OF CARE
Problem: SLP  Goal: SLP Goal  Description: Speech Language Pathology Goals  Goals expected to be met by 12/29  1. Pt will tolerated regular diet & thin liquids without s/s aspiration  Outcome: Ongoing, Progressing    SLP Clinical Swallow Evaluation completed. See note for details.

## 2022-12-22 NOTE — PROGRESS NOTES
12/22  Pt is currently admitted [facility] on 12/21. advised wife to call once the patient is D/C

## 2022-12-22 NOTE — PLAN OF CARE
Plan of care discussed with patient. Upon assessment patient was sitting on side bed with blood on floor, in bathroom and in trash ca. He states his packing came out his nose, sitting with two boxes of Kleenex. Contacted Dr. Sandoval who instructed me to spray Afrin inside patients nose and apply pressure for 20 minutes, intervention performed with minimal bleeding continued.

## 2022-12-22 NOTE — ASSESSMENT & PLAN NOTE
Pt with chest squeezing with exertion. Trop elevated to 2. Concern for ACS at this time although picture complicated by epistaxis     Would start ASA and Plavix as tolerated from an ENT standpoint   Pt will need angiogram prior to discharge   Recommend HI statin, BB   Repeat ECHO   Continue to trend trops

## 2022-12-23 ENCOUNTER — TELEPHONE (OUTPATIENT)
Dept: INTERNAL MEDICINE | Facility: CLINIC | Age: 82
End: 2022-12-23
Payer: MEDICARE

## 2022-12-23 LAB
ALBUMIN SERPL BCP-MCNC: 3.2 G/DL (ref 3.5–5.2)
ALP SERPL-CCNC: 50 U/L (ref 55–135)
ALT SERPL W/O P-5'-P-CCNC: 12 U/L (ref 10–44)
ANION GAP SERPL CALC-SCNC: 9 MMOL/L (ref 8–16)
APTT BLDCRRT: 34.2 SEC (ref 21–32)
AST SERPL-CCNC: 19 U/L (ref 10–40)
BASOPHILS # BLD AUTO: 0.03 K/UL (ref 0–0.2)
BASOPHILS NFR BLD: 0.5 % (ref 0–1.9)
BILIRUB SERPL-MCNC: 0.5 MG/DL (ref 0.1–1)
BLD PROD TYP BPU: NORMAL
BLOOD UNIT EXPIRATION DATE: NORMAL
BLOOD UNIT TYPE CODE: 9500
BLOOD UNIT TYPE: NORMAL
BUN SERPL-MCNC: 88 MG/DL (ref 8–23)
CALCIUM SERPL-MCNC: 9.6 MG/DL (ref 8.7–10.5)
CHLORIDE SERPL-SCNC: 110 MMOL/L (ref 95–110)
CO2 SERPL-SCNC: 18 MMOL/L (ref 23–29)
CODING SYSTEM: NORMAL
CREAT SERPL-MCNC: 2.6 MG/DL (ref 0.5–1.4)
DIFFERENTIAL METHOD: ABNORMAL
DISPENSE STATUS: NORMAL
EOSINOPHIL # BLD AUTO: 0.1 K/UL (ref 0–0.5)
EOSINOPHIL NFR BLD: 2.5 % (ref 0–8)
ERYTHROCYTE [DISTWIDTH] IN BLOOD BY AUTOMATED COUNT: 15.3 % (ref 11.5–14.5)
EST. GFR  (NO RACE VARIABLE): 24 ML/MIN/1.73 M^2
GLUCOSE SERPL-MCNC: 93 MG/DL (ref 70–110)
HCT VFR BLD AUTO: 23.2 % (ref 40–54)
HGB BLD-MCNC: 7.3 G/DL (ref 14–18)
IMM GRANULOCYTES # BLD AUTO: 0.02 K/UL (ref 0–0.04)
IMM GRANULOCYTES NFR BLD AUTO: 0.4 % (ref 0–0.5)
INR PPP: 2.3 (ref 0.8–1.2)
LYMPHOCYTES # BLD AUTO: 1.1 K/UL (ref 1–4.8)
LYMPHOCYTES NFR BLD: 19.3 % (ref 18–48)
MAGNESIUM SERPL-MCNC: 1.9 MG/DL (ref 1.6–2.6)
MCH RBC QN AUTO: 30.7 PG (ref 27–31)
MCHC RBC AUTO-ENTMCNC: 31.5 G/DL (ref 32–36)
MCV RBC AUTO: 98 FL (ref 82–98)
MONOCYTES # BLD AUTO: 0.6 K/UL (ref 0.3–1)
MONOCYTES NFR BLD: 10.7 % (ref 4–15)
NEUTROPHILS # BLD AUTO: 3.8 K/UL (ref 1.8–7.7)
NEUTROPHILS NFR BLD: 66.6 % (ref 38–73)
NRBC BLD-RTO: 0 /100 WBC
PHOSPHATE SERPL-MCNC: 3.1 MG/DL (ref 2.7–4.5)
PLATELET # BLD AUTO: 120 K/UL (ref 150–450)
PMV BLD AUTO: 11.3 FL (ref 9.2–12.9)
POCT GLUCOSE: 155 MG/DL (ref 70–110)
POCT GLUCOSE: 191 MG/DL (ref 70–110)
POCT GLUCOSE: 215 MG/DL (ref 70–110)
POCT GLUCOSE: 89 MG/DL (ref 70–110)
POTASSIUM SERPL-SCNC: 4.8 MMOL/L (ref 3.5–5.1)
PROT SERPL-MCNC: 6.3 G/DL (ref 6–8.4)
PROTHROMBIN TIME: 23 SEC (ref 9–12.5)
RBC # BLD AUTO: 2.38 M/UL (ref 4.6–6.2)
SODIUM SERPL-SCNC: 137 MMOL/L (ref 136–145)
TRANS ERYTHROCYTES VOL PATIENT: NORMAL ML
TROPONIN I SERPL DL<=0.01 NG/ML-MCNC: 1.72 NG/ML (ref 0–0.03)
WBC # BLD AUTO: 5.7 K/UL (ref 3.9–12.7)

## 2022-12-23 PROCEDURE — 11000001 HC ACUTE MED/SURG PRIVATE ROOM: Mod: HCNC

## 2022-12-23 PROCEDURE — P9021 RED BLOOD CELLS UNIT: HCPCS | Mod: HCNC | Performed by: HOSPITALIST

## 2022-12-23 PROCEDURE — 99291 PR CRITICAL CARE, E/M 30-74 MINUTES: ICD-10-PCS | Mod: HCNC,,, | Performed by: HOSPITALIST

## 2022-12-23 PROCEDURE — 99232 SBSQ HOSP IP/OBS MODERATE 35: CPT | Mod: HCNC,,, | Performed by: INTERNAL MEDICINE

## 2022-12-23 PROCEDURE — 25000003 PHARM REV CODE 250: Mod: HCNC

## 2022-12-23 PROCEDURE — 25000003 PHARM REV CODE 250: Mod: HCNC | Performed by: HOSPITALIST

## 2022-12-23 PROCEDURE — 83735 ASSAY OF MAGNESIUM: CPT | Mod: HCNC | Performed by: HOSPITALIST

## 2022-12-23 PROCEDURE — 99232 PR SUBSEQUENT HOSPITAL CARE,LEVL II: ICD-10-PCS | Mod: HCNC,,, | Performed by: INTERNAL MEDICINE

## 2022-12-23 PROCEDURE — 99233 PR SUBSEQUENT HOSPITAL CARE,LEVL III: ICD-10-PCS | Mod: HCNC,GC,, | Performed by: INTERNAL MEDICINE

## 2022-12-23 PROCEDURE — 80053 COMPREHEN METABOLIC PANEL: CPT | Mod: HCNC | Performed by: HOSPITALIST

## 2022-12-23 PROCEDURE — 85025 COMPLETE CBC W/AUTO DIFF WBC: CPT | Mod: HCNC | Performed by: HOSPITALIST

## 2022-12-23 PROCEDURE — 84100 ASSAY OF PHOSPHORUS: CPT | Mod: HCNC | Performed by: HOSPITALIST

## 2022-12-23 PROCEDURE — 85610 PROTHROMBIN TIME: CPT | Mod: HCNC | Performed by: HOSPITALIST

## 2022-12-23 PROCEDURE — 94761 N-INVAS EAR/PLS OXIMETRY MLT: CPT | Mod: HCNC

## 2022-12-23 PROCEDURE — 99291 CRITICAL CARE FIRST HOUR: CPT | Mod: HCNC,,, | Performed by: HOSPITALIST

## 2022-12-23 PROCEDURE — 99233 SBSQ HOSP IP/OBS HIGH 50: CPT | Mod: HCNC,GC,, | Performed by: INTERNAL MEDICINE

## 2022-12-23 PROCEDURE — 84484 ASSAY OF TROPONIN QUANT: CPT | Mod: HCNC | Performed by: HOSPITALIST

## 2022-12-23 PROCEDURE — 36415 COLL VENOUS BLD VENIPUNCTURE: CPT | Mod: HCNC | Performed by: HOSPITALIST

## 2022-12-23 PROCEDURE — 85730 THROMBOPLASTIN TIME PARTIAL: CPT | Mod: HCNC | Performed by: HOSPITALIST

## 2022-12-23 RX ORDER — OXYMETAZOLINE HCL 0.05 %
2 SPRAY, NON-AEROSOL (ML) NASAL DAILY PRN
Status: ON HOLD | COMMUNITY
End: 2022-12-31 | Stop reason: SDUPTHER

## 2022-12-23 RX ORDER — ISOSORBIDE DINITRATE 20 MG/1
20 TABLET ORAL 3 TIMES DAILY
Status: DISCONTINUED | OUTPATIENT
Start: 2022-12-23 | End: 2022-12-27

## 2022-12-23 RX ORDER — HEPARIN SODIUM,PORCINE/D5W 25000/250
0-40 INTRAVENOUS SOLUTION INTRAVENOUS CONTINUOUS
Status: DISCONTINUED | OUTPATIENT
Start: 2022-12-23 | End: 2022-12-23

## 2022-12-23 RX ORDER — WARFARIN 2.5 MG/1
5 TABLET ORAL DAILY
Status: DISCONTINUED | OUTPATIENT
Start: 2022-12-23 | End: 2022-12-24

## 2022-12-23 RX ORDER — HYDROCODONE BITARTRATE AND ACETAMINOPHEN 500; 5 MG/1; MG/1
TABLET ORAL
Status: DISCONTINUED | OUTPATIENT
Start: 2022-12-23 | End: 2022-12-28 | Stop reason: HOSPADM

## 2022-12-23 RX ORDER — METOPROLOL TARTRATE 25 MG/1
25 TABLET, FILM COATED ORAL 3 TIMES DAILY
Status: DISCONTINUED | OUTPATIENT
Start: 2022-12-23 | End: 2022-12-24

## 2022-12-23 RX ADMIN — METOPROLOL TARTRATE 25 MG: 25 TABLET, FILM COATED ORAL at 03:12

## 2022-12-23 RX ADMIN — ISOSORBIDE DINITRATE 20 MG: 20 TABLET ORAL at 03:12

## 2022-12-23 RX ADMIN — CARVEDILOL 12.5 MG: 12.5 TABLET, FILM COATED ORAL at 08:12

## 2022-12-23 RX ADMIN — FAMOTIDINE 20 MG: 20 TABLET ORAL at 08:12

## 2022-12-23 RX ADMIN — Medication 324 MG: at 08:12

## 2022-12-23 RX ADMIN — WARFARIN SODIUM 5 MG: 2.5 TABLET ORAL at 05:12

## 2022-12-23 RX ADMIN — ASPIRIN 81 MG: 81 TABLET, COATED ORAL at 08:12

## 2022-12-23 RX ADMIN — CLOPIDOGREL BISULFATE 75 MG: 75 TABLET ORAL at 08:12

## 2022-12-23 RX ADMIN — OMEGA-3-ACID ETHYL ESTERS 2 G: 1 CAPSULE, LIQUID FILLED ORAL at 09:12

## 2022-12-23 RX ADMIN — ALLOPURINOL 100 MG: 100 TABLET ORAL at 08:12

## 2022-12-23 RX ADMIN — OMEGA-3-ACID ETHYL ESTERS 2 G: 1 CAPSULE, LIQUID FILLED ORAL at 08:12

## 2022-12-23 RX ADMIN — ATORVASTATIN CALCIUM 80 MG: 40 TABLET, FILM COATED ORAL at 09:12

## 2022-12-23 NOTE — CONSULTS
82 y.o. male with past medical history of CAD s/p CABG, PCI (11/2021), mechanical aortic valve (2014) on coumadin (supratherapeutic on warfarin > 6), HFmEF, CVA, CKD III, HTN, who presented to the ED with chest pain and epistaxis. IR consulted for selective ECA arterial embolization for further management of epistaxis.     Course: Hemodynamic stable - down trending INR. On ENT management - Rhino rocket and Afrin. Suspected anterior source of bleeding / non tumor or hereditary pathology - with control on above management, with negative concerns of intractable epistaxis.     Recs:    Will hold off on any advanced endovascular embolization treatment at the moment - given the control on topical treatment and packing; and considering the possibility of groin/access related complications at the background of supra therapeutic INR.         Sixto Brooks MD     Neuro Endovascular Surgery Fellow   [facility]

## 2022-12-23 NOTE — ASSESSMENT & PLAN NOTE
· Chronic issue  · Coreg changed to Metoprolol and Isosorbide started per Cards  · ARB when renal function improves

## 2022-12-23 NOTE — SUBJECTIVE & OBJECTIVE
Interval History: No further epistaxis overnight.  Cardiology suggested full dose Aspirin and Plavix and home Warfarin for an NSTEMI, and Interventional Cardiology consult.  IC suggested medical management and goal Hgb >8.  He did have black stools on the morning of 12/23, but no further epistaxis - it seems like he swallowed blood during his epistaxis, so will hold on GI consult for now.  Wife at bedside.  Discussed increased bleeding risk with Aspirin, Plavix, Warfarin - she mentions that he has had epistaxis even with normal INR.  Plan to watch overnight, monitor renal function.    Review of Systems   Constitutional:  Negative for chills, fatigue and fever.   HENT:  Negative for nosebleeds and postnasal drip.    Respiratory:  Negative for cough and shortness of breath.    Cardiovascular:  Negative for chest pain, palpitations and leg swelling.   Gastrointestinal:  Positive for blood in stool. Negative for abdominal pain, diarrhea, nausea and vomiting.   Genitourinary:  Negative for dysuria and urgency.   Neurological:  Negative for dizziness and headaches.   All other systems reviewed and are negative.  Objective:     Vital Signs (Most Recent):  Temp: 98 °F (36.7 °C) (12/23/22 1120)  Pulse: (!) 57 (12/23/22 1120)  Resp: 17 (12/23/22 1120)  BP: 122/60 (12/23/22 1120)  SpO2: 96 % (12/23/22 1120)   Vital Signs (24h Range):  Temp:  [97.5 °F (36.4 °C)-100.4 °F (38 °C)] 98 °F (36.7 °C)  Pulse:  [56-83] 57  Resp:  [16-18] 17  SpO2:  [90 %-98 %] 96 %  BP: ()/(49-76) 122/60     Weight: 78 kg (171 lb 15.3 oz)  Body mass index is 28.62 kg/m².    Intake/Output Summary (Last 24 hours) at 12/23/2022 1124  Last data filed at 12/22/2022 2015  Gross per 24 hour   Intake 375 ml   Output --   Net 375 ml        Physical Exam  Constitutional:       Appearance: Normal appearance. He is well-developed.   HENT:      Head: Normocephalic and atraumatic.   Cardiovascular:      Rate and Rhythm: Regular rhythm. Bradycardia present.       Heart sounds: No murmur heard.  Pulmonary:      Effort: Pulmonary effort is normal. No respiratory distress.      Breath sounds: Normal breath sounds. No wheezing or rales.   Abdominal:      General: There is no distension.      Palpations: Abdomen is soft.      Tenderness: There is no abdominal tenderness.   Musculoskeletal:         General: No deformity.   Skin:     General: Skin is warm.   Neurological:      Mental Status: He is alert.       Significant Labs: All pertinent labs within the past 24 hours have been reviewed.  CBC:   Recent Labs   Lab 12/21/22  1701 12/22/22  0856 12/23/22  0513   WBC 6.67 7.44 5.70   HGB 7.7* 6.9* 7.3*   HCT 24.4* 22.6* 23.2*   * 137* 120*       CMP:   Recent Labs   Lab 12/22/22  0856 12/23/22  0513   * 137   K 5.3* 4.8    110   CO2 19* 18*    93   BUN 90* 88*   CREATININE 2.5* 2.6*   CALCIUM 9.7 9.6   PROT 6.6 6.3   ALBUMIN 3.3* 3.2*   BILITOT 0.6 0.5   ALKPHOS 50* 50*   AST 20 19   ALT 13 12   ANIONGAP 8 9       Coagulation:   Recent Labs   Lab 12/23/22  0513   INR 2.3*   APTT 34.2*         Significant Imaging: I have reviewed all pertinent imaging results/findings within the past 24 hours.

## 2022-12-23 NOTE — ASSESSMENT & PLAN NOTE
· Creatinine 2.5 on admit, baseline around 1.9 - 2.6 today  Estimated Creatinine Clearance: 21.5 mL/min (A) (based on SCr of 2.6 mg/dL (H)).  · Strict I&Os  · Gentle IVF  · Avoid Nephrotoxic agents

## 2022-12-23 NOTE — PLAN OF CARE
[facility] unable to schedule the hospital follow up as there were no available appointments. A message was sent to the provider requesting an appointment on the patients behalf. I added this information to the AVS as a reminder for the patient.

## 2022-12-23 NOTE — ASSESSMENT & PLAN NOTE
· Chest pain in setting of acute bleeding and Hypertension. Exertional CP is chronic though pt reports this episode was worse than baseline.  · Trop 0.047 on admit that peaked at 2.19  · BNP elevated, but below baseline. No acute HF.   · ECG in NSR with 1st degree AV block unchanged from baseline  · Cardiology and Interventional Cardiology consulted  · Medically manage NSTEMI given renal failure  · Goal Hgb >8  · S/p Aspirin 325mg - Continue Aspirin 81mg daily  · S/p Plavix 150mg - Continue Plavix 75mg daily  · Change Coreg to Metoprolol 25mg TID  · Star Imdur 20mg TID  · Unable to use Ranexa with renal function  · Cardiac pet outpatient  · F/u with Cardiology

## 2022-12-23 NOTE — PROGRESS NOTES
[facility]  Cardiology  Progress Note    Patient Name: Dariel Urbina  MRN: [id number]  Admission Date: 12/21/2022  Hospital Length of Stay: 1 days  Code Status: Full Code   Attending Physician: Aurelia Perry MD   Primary Care Physician: Devon Langston MD  Expected Discharge Date: 12/23/2022  Principal Problem:Acute blood loss anemia    Subjective:     Hospital Course:   No notes on file    Interval History: Hgb at 7.3. Pt reports some chest pressure whenever he moves around, but denies any nausea or dyspnea. Had one episode of epistaxis this morning.     Review of Systems   Constitutional: Negative for fever and malaise/fatigue.   HENT:  Positive for nosebleeds.    Eyes:  Negative for blurred vision and pain.   Cardiovascular:  Positive for chest pain. Negative for dyspnea on exertion, leg swelling, orthopnea, palpitations and paroxysmal nocturnal dyspnea.   Respiratory:  Negative for cough, shortness of breath, sputum production and wheezing.    Hematologic/Lymphatic: Negative for adenopathy and bleeding problem.   Skin:  Negative for rash.   Musculoskeletal:  Negative for back pain and neck pain.   Gastrointestinal:  Negative for abdominal pain, constipation, diarrhea, nausea and vomiting.   Genitourinary:  Negative for dysuria.   Neurological:  Negative for dizziness, headaches, light-headedness and weakness.   Objective:     Vital Signs (Most Recent):  Temp: 98 °F (36.7 °C) (12/23/22 1120)  Pulse: 64 (12/23/22 1146)  Resp: 17 (12/23/22 1120)  BP: 122/60 (12/23/22 1120)  SpO2: 96 % (12/23/22 1120) Vital Signs (24h Range):  Temp:  [97.5 °F (36.4 °C)-100.4 °F (38 °C)] 98 °F (36.7 °C)  Pulse:  [56-83] 64  Resp:  [16-18] 17  SpO2:  [90 %-97 %] 96 %  BP: ()/(49-76) 122/60     Weight: 78 kg (171 lb 15.3 oz)  Body mass index is 28.62 kg/m².     SpO2: 96 %         Intake/Output Summary (Last 24 hours) at 12/23/2022 1352  Last data filed at 12/23/2022 1330  Gross per 24 hour   Intake 632.5 ml    Output --   Net 632.5 ml       Lines/Drains/Airways       Peripheral Intravenous Line  Duration                  Peripheral IV - Single Lumen 12/21/22 1040 18 G Left Antecubital 2 days                    Physical Exam  Constitutional:       General: He is not in acute distress.     Appearance: Normal appearance. He is not ill-appearing, toxic-appearing or diaphoretic.   HENT:      Head: Normocephalic and atraumatic.      Nose:      Comments: Nasal packing present. Some dried blood present  Eyes:      Extraocular Movements: Extraocular movements intact.      Pupils: Pupils are equal, round, and reactive to light.   Cardiovascular:      Rate and Rhythm: Normal rate and regular rhythm.      Heart sounds: No murmur heard.    No friction rub. No gallop.   Pulmonary:      Effort: Pulmonary effort is normal. No respiratory distress.      Breath sounds: Normal breath sounds. No wheezing or rales.   Abdominal:      General: Abdomen is flat. There is no distension.      Palpations: Abdomen is soft. There is no mass.      Tenderness: There is no abdominal tenderness.   Musculoskeletal:         General: No swelling. Normal range of motion.      Cervical back: Normal range of motion. No rigidity.      Right lower leg: No edema.      Left lower leg: No edema.   Skin:     General: Skin is warm and dry.      Coloration: Skin is not jaundiced.      Findings: No bruising.   Neurological:      General: No focal deficit present.      Mental Status: He is alert and oriented to person, place, and time.      Cranial Nerves: No cranial nerve deficit.      Motor: No weakness.       Significant Labs: All pertinent lab results from the last 24 hours have been reviewed.    Significant Imaging: Echocardiogram: Transthoracic echo (TTE) complete (Cupid Only):   Results for orders placed or performed during the hospital encounter of 10/05/22   Echo   Result Value Ref Range    Ascending aorta 3.17 cm    STJ 2.79 cm    AV mean gradient 13 mmHg     Ao peak ashkan 2.57 m/s    Ao VTI 48.96 cm    IVRT 71.36 msec    IVS 1.05 0.6 - 1.1 cm    LA size 5.43 cm    Left Atrium Major Axis 6.31 cm    Left Atrium Minor Axis 6.81 cm    LVIDd 5.93 3.5 - 6.0 cm    LVIDs 4.32 (A) 2.1 - 4.0 cm    LVOT diameter 2.00 cm    LVOT peak VTI 17.54 cm    Posterior Wall 1.00 0.6 - 1.1 cm    MV Peak E Ashkan 1.16 m/s    RA Major Axis 5.27 cm    RA Width 5.10 cm    RVDD 5.44 cm    Sinus 3.42 cm    TAPSE 1.62 cm    TR Max Ashkan 3.11 m/s    TDI LATERAL 0.10 m/s    TDI SEPTAL 0.05 m/s    LA WIDTH 5.03 cm    LV Diastolic Volume 174.95 mL    LV Systolic Volume 83.91 mL    RV S' 9.06 cm/s    LVOT peak ashkan 0.82 m/s    LA volume (mod) 113.30 cm3    LV LATERAL E/E' RATIO 11.60 m/s    LV SEPTAL E/E' RATIO 23.20 m/s    FS 27 %    LA volume 152.08 cm3    LV mass 249.90 g    Left Ventricle Relative Wall Thickness 0.34 cm    AV valve area 1.12 cm2    AV Velocity Ratio 0.32     AV index (prosthetic) 0.36     Mean e' 0.08 m/s    LVOT area 3.1 cm2    LVOT stroke volume 55.08 cm3    AV peak gradient 26 mmHg    E/E' ratio 15.47 m/s    Triscuspid Valve Regurgitation Peak Gradient 39 mmHg    BSA 1.96 m2    LV Systolic Volume Index 43.7 mL/m2    LV Diastolic Volume Index 91.12 mL/m2    LA Volume Index 79.2 mL/m2    LV Mass Index 130 g/m2    LA Volume Index (Mod) 59.0 mL/m2    Right Atrial Pressure (from IVC) 3 mmHg    EF 45 %    TV rest pulmonary artery pressure 42 mmHg    Narrative    · The left ventricle is mildly enlarged with eccentric hypertrophy and   mildly decreased systolic function. The estimated ejection fraction is   45%.  · There is abnormal septal wall motion consistent with post-operative   status.  · Moderate right ventricular enlargement with normal right ventricular   systolic function.  · Left ventricular diastolic dysfunction.  · Biatrial enlargement.  · There is a mechanical aortic valve present. There is no aortic   insufficiency present. Prosthetic aortic valve is normal.  · The aortic valve  mean gradient is 13 mmHg with a dimensionless index of   0.36.  · Mild-to-moderate mitral regurgitation.  · Mild to moderate tricuspid regurgitation.  · The estimated PA systolic pressure is 42 mmHg.  · Normal central venous pressure (3 mmHg).        Assessment and Plan:     NSTEMI (non-ST elevated myocardial infarction)  Pt with chest squeezing with exertion. Trop elevated to 2. Concern for ACS at this time although picture complicated by epistaxis     Recommendations:  Cont ASA. DC Plavix  Switch Coreg to Metoprolol Tartrate 25 mg TID  Cont Atorvastatin 80 mg  Start Isosorbide dinitrate 20 mg TID   Treat anemia as he continues to have chest pain. Hgb goal >8  Can start Ranolazine 500 mg daily (would monitor Cr with BMP in 1 week outpatient)  Outpatient PET Stress Test  Resume ARB after JIM resolves  F/U with Cardiology outpatient      H/O mechanical aortic valve replacement  Can restart Warfarin as INR is between 2-3        VTE Risk Mitigation (From admission, onward)           Ordered     warfarin (COUMADIN) tablet 5 mg  Daily         12/23/22 0855     IP VTE HIGH RISK PATIENT  Once         12/21/22 1717     Place sequential compression device  Until discontinued         12/21/22 1717                    Louis Wynn MD  Cardiology  [facility]

## 2022-12-23 NOTE — ASSESSMENT & PLAN NOTE
BMP reviewed- noted Estimated Creatinine Clearance: 21.5 mL/min (A) (based on SCr of 2.6 mg/dL (H)). according to latest data. Monitor UOP and serial BMP and adjust therapy as needed. Renally dose meds.  Baseline ~1.9.   · Likely pre-renal from dehydration and volume losses in setting of persistent epistaxis  · If no improvement with conservative measures overnight can further work up  · Consider urine lytes and renal ultrasound  · Strict I&Os  · Gentle IVF  · Avoid Nephrotoxic agents

## 2022-12-23 NOTE — PHARMACY MED REC
"Admission Medication History     The home medication history was taken by Winifred Kemp.    You may go to "Admission" then "Reconcile Home Medications" tabs to review and/or act upon these items.     The home medication list has been updated by the Pharmacy department.   Please read ALL comments highlighted in yellow.   Please address this information as you see fit.    Feel free to contact us if you have any questions or require assistance.      The medications listed below were removed from the home medication list. Please reorder if appropriate:  Patient reports no longer taking the following medication(s):  ENSURE ORAL  OMEGA-3 ACID ETHYL ESTERS (LOVAZA) 1 GRAM    Medications listed below were obtained from: Patient/family  PTA Medications   Medication Sig    allopurinoL (ZYLOPRIM) 100 MG tablet   TAKE 1 TABLET EVERY DAY    bumetanide (BUMEX) 1 MG tablet   Take 1 mg by mouth once daily. Take an additional tablet if needed for edema    carvediloL (COREG) 12.5 MG tablet   Take 1 tablet (12.5 mg total) by mouth 2 (two) times daily with meals.    ferrous gluconate (FERGON) 324 MG tablet   Take 1 tablet (324 mg total) by mouth daily with breakfast.    isosorbide mononitrate (IMDUR) 120 MG 24 hr tablet   Take 1 tablet (120 mg total) by mouth once daily.    lisinopriL (PRINIVIL,ZESTRIL) 2.5 MG tablet   Take 1 tablet (2.5 mg total) by mouth once daily.    loperamide (IMODIUM) 2 mg capsule   Take 2 mg by mouth 4 (four) times daily as needed for Diarrhea.    oxymetazoline (AFRIN) 0.05 % nasal spray   2 sprays by Nasal route daily as needed (nose bleed).    prednisolon/gatiflox/bromfenac (PREDNISOL ACE-GATIFLOX-BROMFEN) 1-0.5-0.075 % DrpS   Apply 1 drop to eye 3 (three) times daily. One drop 3 times a day in surgical eye    rosuvastatin (CRESTOR) 40 MG Tab   TAKE 1 TABLET EVERY EVENING.    warfarin (COUMADIN) 5 MG tablet Take 1 tablet (5 mg) by mouth Monday&Friday then 1.5 tablets (7.5mg) by mouth all other days as " instructed by Coumadin Clinic.         Winifred Kemp  EXT [id number]                  .

## 2022-12-23 NOTE — HPI
Dariel Urbina is a 82 y.o. male with CAD s/p CABG, PCI (11/2021 with SILVIO to ostial LCX and prox LCX. Ost LAD lesion 80% stenosed, patent LIMA to LAD), mechanical aortic valve (2014) on coumadin, HFmEF, CVA, CKD III, HTN, who presented to the ED with chest pain and epistaxis. Patient reports long-standing history of episodes of epistaxis and has had several cauterizations with ENT. Last admission for this issue was in October. Found to be supratherapeutic at warfarin clinic and was told to hold, so he has not taken it for the last 2 days.  This episode the bleeding began 12/20/22 and has persisted since that time. He also c/o chronic recurrent exertional chest pain that is worse than his baseline. He says it is a central chest pressure that feels like his chest is being squeezed when he exerts himself. Of note, Hgb noted to be 6.9 yesterday which necessitated blood transfusion.     AF. SBP in 110s-190s. Hgb 7.8 from 9.8. Creatinine 2.5 (baseline ~1.9, though variable). . Trop 0.047 -> 0.052. Troponin peaked at 2.5 without chest pain. ECG without St changes.       He was evaluated by ENT and had rhino rockets placed.  He continued to bleed despite rhino rocket and Afrin. He was given 1 unit PRBCs for Hgb 6.9. IR consult placed for IR embolization given recurrent bleeding.  INR down to 3.4 today, goal 2-3.  Hgb returned at 6.9 with plans to transfuse      10/5/22 TTE               The left ventricle is mildly enlarged with eccentric hypertrophy and mildly decreased systolic function. The estimated ejection fraction is 45%.              There is abnormal septal wall motion consistent with post-operative status.              Moderate right ventricular enlargement with normal right ventricular systolic function.              Left ventricular diastolic dysfunction.              Biatrial enlargement.              There is a mechanical aortic valve present. There is no aortic insufficiency present. Prosthetic  aortic valve is normal.              The aortic valve mean gradient is 13 mmHg with a dimensionless index of 0.36.              Mild-to-moderate mitral regurgitation.              Mild to moderate tricuspid regurgitation.              The estimated PA systolic pressure is 42 mmHg.              Normal central venous pressure (3 mmHg).

## 2022-12-23 NOTE — SUBJECTIVE & OBJECTIVE
Interval History: Hgb at 7.3. Pt reports some chest pressure whenever he moves around, but denies any nausea or dyspnea. Had one episode of epistaxis this morning.     Review of Systems   Constitutional: Negative for fever and malaise/fatigue.   HENT:  Positive for nosebleeds.    Eyes:  Negative for blurred vision and pain.   Cardiovascular:  Positive for chest pain. Negative for dyspnea on exertion, leg swelling, orthopnea, palpitations and paroxysmal nocturnal dyspnea.   Respiratory:  Negative for cough, shortness of breath, sputum production and wheezing.    Hematologic/Lymphatic: Negative for adenopathy and bleeding problem.   Skin:  Negative for rash.   Musculoskeletal:  Negative for back pain and neck pain.   Gastrointestinal:  Negative for abdominal pain, constipation, diarrhea, nausea and vomiting.   Genitourinary:  Negative for dysuria.   Neurological:  Negative for dizziness, headaches, light-headedness and weakness.   Objective:     Vital Signs (Most Recent):  Temp: 98 °F (36.7 °C) (12/23/22 1120)  Pulse: 64 (12/23/22 1146)  Resp: 17 (12/23/22 1120)  BP: 122/60 (12/23/22 1120)  SpO2: 96 % (12/23/22 1120) Vital Signs (24h Range):  Temp:  [97.5 °F (36.4 °C)-100.4 °F (38 °C)] 98 °F (36.7 °C)  Pulse:  [56-83] 64  Resp:  [16-18] 17  SpO2:  [90 %-97 %] 96 %  BP: ()/(49-76) 122/60     Weight: 78 kg (171 lb 15.3 oz)  Body mass index is 28.62 kg/m².     SpO2: 96 %         Intake/Output Summary (Last 24 hours) at 12/23/2022 1352  Last data filed at 12/23/2022 1330  Gross per 24 hour   Intake 632.5 ml   Output --   Net 632.5 ml       Lines/Drains/Airways       Peripheral Intravenous Line  Duration                  Peripheral IV - Single Lumen 12/21/22 1040 18 G Left Antecubital 2 days                    Physical Exam  Constitutional:       General: He is not in acute distress.     Appearance: Normal appearance. He is not ill-appearing, toxic-appearing or diaphoretic.   HENT:      Head: Normocephalic and  atraumatic.      Nose:      Comments: Nasal packing present. Some dried blood present  Eyes:      Extraocular Movements: Extraocular movements intact.      Pupils: Pupils are equal, round, and reactive to light.   Cardiovascular:      Rate and Rhythm: Normal rate and regular rhythm.      Heart sounds: No murmur heard.    No friction rub. No gallop.   Pulmonary:      Effort: Pulmonary effort is normal. No respiratory distress.      Breath sounds: Normal breath sounds. No wheezing or rales.   Abdominal:      General: Abdomen is flat. There is no distension.      Palpations: Abdomen is soft. There is no mass.      Tenderness: There is no abdominal tenderness.   Musculoskeletal:         General: No swelling. Normal range of motion.      Cervical back: Normal range of motion. No rigidity.      Right lower leg: No edema.      Left lower leg: No edema.   Skin:     General: Skin is warm and dry.      Coloration: Skin is not jaundiced.      Findings: No bruising.   Neurological:      General: No focal deficit present.      Mental Status: He is alert and oriented to person, place, and time.      Cranial Nerves: No cranial nerve deficit.      Motor: No weakness.       Significant Labs: All pertinent lab results from the last 24 hours have been reviewed.    Significant Imaging: Echocardiogram: Transthoracic echo (TTE) complete (Cupid Only):   Results for orders placed or performed during the hospital encounter of 10/05/22   Echo   Result Value Ref Range    Ascending aorta 3.17 cm    STJ 2.79 cm    AV mean gradient 13 mmHg    Ao peak ashkan 2.57 m/s    Ao VTI 48.96 cm    IVRT 71.36 msec    IVS 1.05 0.6 - 1.1 cm    LA size 5.43 cm    Left Atrium Major Axis 6.31 cm    Left Atrium Minor Axis 6.81 cm    LVIDd 5.93 3.5 - 6.0 cm    LVIDs 4.32 (A) 2.1 - 4.0 cm    LVOT diameter 2.00 cm    LVOT peak VTI 17.54 cm    Posterior Wall 1.00 0.6 - 1.1 cm    MV Peak E Ashkan 1.16 m/s    RA Major Axis 5.27 cm    RA Width 5.10 cm    RVDD 5.44 cm     Sinus 3.42 cm    TAPSE 1.62 cm    TR Max Ashkan 3.11 m/s    TDI LATERAL 0.10 m/s    TDI SEPTAL 0.05 m/s    LA WIDTH 5.03 cm    LV Diastolic Volume 174.95 mL    LV Systolic Volume 83.91 mL    RV S' 9.06 cm/s    LVOT peak ashkan 0.82 m/s    LA volume (mod) 113.30 cm3    LV LATERAL E/E' RATIO 11.60 m/s    LV SEPTAL E/E' RATIO 23.20 m/s    FS 27 %    LA volume 152.08 cm3    LV mass 249.90 g    Left Ventricle Relative Wall Thickness 0.34 cm    AV valve area 1.12 cm2    AV Velocity Ratio 0.32     AV index (prosthetic) 0.36     Mean e' 0.08 m/s    LVOT area 3.1 cm2    LVOT stroke volume 55.08 cm3    AV peak gradient 26 mmHg    E/E' ratio 15.47 m/s    Triscuspid Valve Regurgitation Peak Gradient 39 mmHg    BSA 1.96 m2    LV Systolic Volume Index 43.7 mL/m2    LV Diastolic Volume Index 91.12 mL/m2    LA Volume Index 79.2 mL/m2    LV Mass Index 130 g/m2    LA Volume Index (Mod) 59.0 mL/m2    Right Atrial Pressure (from IVC) 3 mmHg    EF 45 %    TV rest pulmonary artery pressure 42 mmHg    Narrative    · The left ventricle is mildly enlarged with eccentric hypertrophy and   mildly decreased systolic function. The estimated ejection fraction is   45%.  · There is abnormal septal wall motion consistent with post-operative   status.  · Moderate right ventricular enlargement with normal right ventricular   systolic function.  · Left ventricular diastolic dysfunction.  · Biatrial enlargement.  · There is a mechanical aortic valve present. There is no aortic   insufficiency present. Prosthetic aortic valve is normal.  · The aortic valve mean gradient is 13 mmHg with a dimensionless index of   0.36.  · Mild-to-moderate mitral regurgitation.  · Mild to moderate tricuspid regurgitation.  · The estimated PA systolic pressure is 42 mmHg.  · Normal central venous pressure (3 mmHg).

## 2022-12-23 NOTE — ASSESSMENT & PLAN NOTE
Pt with chest squeezing with exertion. Trop elevated to 2. Concern for ACS at this time although picture complicated by epistaxis     Recommendations:  Cont ASA and Plavix  Switch Coreg to Metoprolol Tartrate 25 mg TID  Cont Atorvastatin 80 mg  Start Isosorbide dinitrate 20 mg TID   Treat anemia as he continues to have chest pain. Hgb goal >8  Can start Ranolazine 500 mg daily (would monitor Cr with BMP in 1 week outpatient)  Outpatient PET Stress Test  Resume ARB after JIM resolves  F/U with Cardiology outpatient

## 2022-12-23 NOTE — PLAN OF CARE
No acute events during shift, small nose bleed this am, controlled easily, provider aware. Received 1 unit RBCs, tolerated well. Bed locked and in lowest position, call light in reach, side rails up x2. Instructed to call for assistance, verbalized understanding. Wife at bedside, will continue with POC.    Problem: Adult Inpatient Plan of Care  Goal: Plan of Care Review  Outcome: Ongoing, Progressing     Problem: Adult Inpatient Plan of Care  Goal: Absence of Hospital-Acquired Illness or Injury  Outcome: Ongoing, Progressing     Problem: Adult Inpatient Plan of Care  Goal: Optimal Comfort and Wellbeing  Outcome: Ongoing, Progressing     Problem: Infection  Goal: Absence of Infection Signs and Symptoms  Outcome: Ongoing, Progressing     Problem: Diabetes Comorbidity  Goal: Blood Glucose Level Within Targeted Range  Outcome: Ongoing, Progressing

## 2022-12-23 NOTE — CONSULTS
[facility]  Interventional Cardiology  Consult Note    Patient Name: Dariel Urbina  MRN: [id number]  Admission Date: 12/21/2022  Hospital Length of Stay: 1 days  Code Status: Full Code   Attending Provider: Aurelia Perry MD  Consulting Provider: Raudel Neal MD  Primary Care Physician: Devon Langston MD  Principal Problem:Acute blood loss anemia    Patient information was obtained from patient and past medical records.     Inpatient consult to Interventional Cardiology  Consult performed by: Raudel Neal MD  Consult ordered by: Aurelia Perry MD        Subjective:     Chief Complaint:  Chest pressure, epistaxis      HPI:  Dariel Urbina is a 82 y.o. male with CAD s/p CABG, PCI (11/2021 with SILVIO to ostial LCX and prox LCX. Ost LAD lesion 80% stenosed, patent LIMA to LAD), mechanical aortic valve (2014) on coumadin, HFmEF, CVA, CKD III, HTN, who presented to the ED with chest pain and epistaxis. Patient reports long-standing history of episodes of epistaxis and has had several cauterizations with ENT. Last admission for this issue was in October. Found to be supratherapeutic at warfarin clinic and was told to hold, so he has not taken it for the last 2 days.  This episode the bleeding began 12/20/22 and has persisted since that time. He also c/o chronic recurrent exertional chest pain that is worse than his baseline. He says it is a central chest pressure that feels like his chest is being squeezed when he exerts himself. Of note, Hgb noted to be 6.9 yesterday which necessitated blood transfusion.     AF. SBP in 110s-190s. Hgb 7.8 from 9.8. Creatinine 2.5 (baseline ~1.9, though variable). . Trop 0.047 -> 0.052. Troponin peaked at 2.5 without chest pain. ECG without St changes.       He was evaluated by ENT and had rhino rockets placed.  He continued to bleed despite rhino rocket and Afrin. He was given 1 unit PRBCs for Hgb 6.9. IR consult placed for IR embolization given  recurrent bleeding.  INR down to 3.4 today, goal 2-3.  Hgb returned at 6.9 with plans to transfuse      10/5/22 TTE               The left ventricle is mildly enlarged with eccentric hypertrophy and mildly decreased systolic function. The estimated ejection fraction is 45%.              There is abnormal septal wall motion consistent with post-operative status.              Moderate right ventricular enlargement with normal right ventricular systolic function.              Left ventricular diastolic dysfunction.              Biatrial enlargement.              There is a mechanical aortic valve present. There is no aortic insufficiency present. Prosthetic aortic valve is normal.              The aortic valve mean gradient is 13 mmHg with a dimensionless index of 0.36.              Mild-to-moderate mitral regurgitation.              Mild to moderate tricuspid regurgitation.              The estimated PA systolic pressure is 42 mmHg.              Normal central venous pressure (3 mmHg).      Past Medical History:   Diagnosis Date    Anemia of chronic renal failure, stage 3 (moderate) 05/27/2015    Anticoagulant long-term use     Atherosclerosis of coronary artery bypass graft of native heart without angina pectoris 09/11/2012    3-27-18 [facility] Two vessel coronary artery disease.   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    Bilateral carotid artery disease 02/09/2017    Bleeding from the nose     Bleeding nose 03/21/2018    Cataract     CKD (chronic kidney disease) stage 3, GFR 30-59 ml/min 05/27/2015    Claudication of left lower extremity 09/17/2014    Colon polyp     Encounter for blood transfusion     Gastroesophageal reflux disease without esophagitis 03/19/2018    Gastrointestinal hemorrhage associated with intestinal diverticulosis 04/01/2018    Glaucoma     H/O mechanical aortic valve replacement 09/17/2014    History of gout 09/26/2012    Hyperparathyroidism due to renal  insufficiency 07/27/2015    Internal hemorrhoid 04/03/2018    Long term current use of anticoagulant therapy 09/26/2012    Mechanical heart valve present     Metabolic acidosis with normal anion gap and bicarbonate losses 03/20/2018    Mixed hyperlipidemia 09/26/2012    NSTEMI (non-ST elevated myocardial infarction) 03/21/2018    Obesity, diabetes, and hypertension syndrome 02/23/2016    PVD (peripheral vascular disease) 09/11/2012    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    Syncope 09/29/2022    Type 2 diabetes mellitus with diabetic peripheral angiopathy without gangrene 05/27/2015    Type 2 diabetes mellitus with stage 3 chronic kidney disease, without long-term current use of insulin 10/02/2013       Past Surgical History:   Procedure Laterality Date    CARDIAC CATHETERIZATION      CARDIAC VALVE REPLACEMENT      CARDIAC VALVE SURGERY      CARPAL TUNNEL RELEASE Right 05/19/2020    Procedure: RELEASE, CARPAL TUNNEL;  Surgeon: Rupesh Norris Jr., MD;  Location: [facility];  Service: Plastics;  Laterality: Right;    CATARACT EXTRACTION Left 11/13/2022        COLON SURGERY      COLONOSCOPY N/A 03/31/2017    Procedure: COLONOSCOPY;  Surgeon: Bruno Raymond MD;  Location: [facility] (4TH FLR);  Service: Endoscopy;  Laterality: N/A;  Patient's wife requesting date.    COLONOSCOPY N/A 04/03/2018    Procedure: COLONOSCOPY;  Surgeon: Bonifacio Pelletier MD;  Location: [facility] (2ND FLR);  Service: Endoscopy;  Laterality: N/A;    COLONOSCOPY N/A 08/13/2018    Procedure: COLONOSCOPY;  Surgeon: Kam Barba MD;  Location: [facility] (2ND FLR);  Service: Endoscopy;  Laterality: N/A;  2nd floor: PA pressure 49; hx of moderate-severe valve disease     per Coumadin clinic-Patient can hold 5 days with lovenox bridge       ok to schedule per Katarina    CORONARY ANGIOGRAPHY N/A 10/04/2021    Procedure: Left heart cath +/- peripheral angiogram;  Surgeon: Jose Ruiz MD;  Location: [facility] CATH LAB;   Service: Cardiology;  Laterality: N/A;    CORONARY ANGIOPLASTY      CORONARY ARTERY BYPASS GRAFT      CORONARY BYPASS GRAFT ANGIOGRAPHY  10/04/2021    Procedure: Bypass graft study;  Surgeon: Jose Ruiz MD;  Location: [facility] CATH LAB;  Service: Cardiology;;    HERNIA REPAIR      INTRAOCULAR PROSTHESES INSERTION Left 11/13/2022    Procedure: INSERTION, IOL PROSTHESIS;  Surgeon: Alia Mckeon MD;  Location: [facility] OR [address] [place] [address];  Service: Ophthalmology;  Laterality: Left;    INTRAOCULAR PROSTHESES INSERTION Right 12/4/2022    Procedure: INSERTION, IOL PROSTHESIS;  Surgeon: Alia Mckeon MD;  Location: [facility] OR [address] [place] [address];  Service: Ophthalmology;  Laterality: Right;    PHACOEMULSIFICATION OF CATARACT Left 11/13/2022    Procedure: PHACOEMULSIFICATION, CATARACT;  Surgeon: Alia Mckeon MD;  Location: [facility] OR [address] [place] [address];  Service: Ophthalmology;  Laterality: Left;    PHACOEMULSIFICATION OF CATARACT Right 12/4/2022    Procedure: PHACOEMULSIFICATION, CATARACT;  Surgeon: Alia Mckeon MD;  Location: [facility] OR [address] [place] [address];  Service: Ophthalmology;  Laterality: Right;    SPINE SURGERY      VASECTOMY         Review of patient's allergies indicates:   Allergen Reactions    Fosinopril      Intolerance- elevates potassium level      Losartan      Intolerance- elevates potassium level       PTA Medications   Medication Sig    allopurinoL (ZYLOPRIM) 100 MG tablet TAKE 1 TABLET EVERY DAY    bumetanide (BUMEX) 1 MG tablet Take 1 tablet (1 mg total) by mouth once daily.    carvediloL (COREG) 12.5 MG tablet Take 1 tablet (12.5 mg total) by mouth 2 (two) times daily with meals.    ferrous gluconate (FERGON) 324 MG tablet Take 1 tablet (324 mg total) by mouth daily with breakfast.    isosorbide mononitrate (IMDUR) 120 MG 24 hr tablet Take 1 tablet (120 mg total) by mouth once daily.    lactose-reduced food (ENSURE ORAL) Take 1-2 cans by mouth once daily    lisinopriL (PRINIVIL,ZESTRIL) 2.5 MG tablet Take 1 tablet (2.5  mg total) by mouth once daily.    loperamide (IMODIUM) 2 mg capsule Take 2 mg by mouth 4 (four) times daily as needed for Diarrhea.    omega-3 acid ethyl esters (LOVAZA) 1 gram capsule Take 2 capsules (2 g total) by mouth 2 (two) times daily.    prednisolon/gatiflox/bromfenac (PREDNISOL ACE-GATIFLOX-BROMFEN) 1-0.5-0.075 % DrpS Apply 1 drop to eye 3 (three) times daily. One drop 3 times a day in surgical eye    prednisolon/gatiflox/bromfenac (PREDNISOL ACE-GATIFLOX-BROMFEN) 1-0.5-0.075 % DrpS Apply 1 drop to eye 3 (three) times daily. One drop 3 times a day in surgical eye    rosuvastatin (CRESTOR) 40 MG Tab TAKE 1 TABLET EVERY EVENING.    warfarin (COUMADIN) 5 MG tablet Take 1 tablet (5 mg) by mouth Monday, Tuesday, Wednesday, Friday & Saturday then 1.5 tablets (7.5mg) by mouth all other days (Thurs & Sun) as instructed by [facility]mad[facility] Clinic. (Patient taking differently: Take 1 tablet (5 mg) by mouth Monday&Friday then 1.5 tablets (7.5mg) by mouth all other days as instructed by Coumadin Clinic.)     Family History       Problem Relation (Age of Onset)    Alcohol abuse Father    Diabetes Brother    Heart disease Mother, Father, Brother, Sister    Heart failure Mother, Father, Brother    No Known Problems Sister, Maternal Grandmother, Maternal Grandfather, Paternal Grandmother, Paternal Grandfather, Maternal Aunt, Maternal Uncle, Paternal Aunt, Paternal Uncle          Tobacco Use    Smoking status: Former     Packs/day: 1.00     Years: 20.00     Pack years: 20.00     Types: Cigarettes     Quit date: 1980     Years since quittin.3     Passive exposure: Never    Smokeless tobacco: Never   Substance and Sexual Activity    Alcohol use: No    Drug use: No    Sexual activity: Not Currently     Review of Systems   HENT:  Positive for nosebleeds.    Cardiovascular:  Positive for chest pain.   All other systems reviewed and are negative.  Objective:     Vital Signs (Most Recent):  Temp: 97.5 °F (36.4 °C)  (12/23/22 0927)  Pulse: 69 (12/23/22 0927)  Resp: 16 (12/23/22 0927)  BP: (!) 158/61 (12/23/22 0927)  SpO2: (!) 94 % (12/23/22 0927)   Vital Signs (24h Range):  Temp:  [97.5 °F (36.4 °C)-100.4 °F (38 °C)] 97.5 °F (36.4 °C)  Pulse:  [56-74] 69  Resp:  [16-18] 16  SpO2:  [90 %-98 %] 94 %  BP: ()/(49-70) 158/61     Weight: 78 kg (171 lb 15.3 oz)  Body mass index is 28.62 kg/m².    SpO2: (!) 94 %         Intake/Output Summary (Last 24 hours) at 12/23/2022 0928  Last data filed at 12/22/2022 2015  Gross per 24 hour   Intake 375 ml   Output --   Net 375 ml       Lines/Drains/Airways       Peripheral Intravenous Line  Duration                  Peripheral IV - Single Lumen 12/21/22 1040 18 G Left Antecubital 1 day                    Physical Exam  Vitals and nursing note reviewed.   Constitutional:       Appearance: Normal appearance.   HENT:      Head: Normocephalic and atraumatic.      Right Ear: External ear normal.      Left Ear: External ear normal.      Nose: Nose normal.      Comments: Crusted blood around nares      Mouth/Throat:      Mouth: Mucous membranes are moist.   Eyes:      Pupils: Pupils are equal, round, and reactive to light.   Cardiovascular:      Rate and Rhythm: Normal rate and regular rhythm.      Pulses: Normal pulses.   Pulmonary:      Effort: Pulmonary effort is normal.   Abdominal:      General: Abdomen is flat.   Musculoskeletal:         General: Normal range of motion.      Cervical back: Normal range of motion.      Right lower leg: No edema.      Left lower leg: No edema.   Skin:     General: Skin is warm and dry.      Capillary Refill: Capillary refill takes less than 2 seconds.   Neurological:      General: No focal deficit present.      Mental Status: He is alert and oriented to person, place, and time.       Significant Labs: All pertinent lab results from the last 24 hours have been reviewed.    Significant Imaging:  reviewed    Assessment and Plan:     * Acute blood loss  anemia  -per primary  -hgb > 8    JIM (acute kidney injury)  -prohibitive to angiogram     Acute on chronic heart failure  -per primary cards    NSTEMI (non-ST elevated myocardial infarction)  -patient with known cad presenting with chest pressure/epistaxis   -currently with jim and acute blood loss anemia requiring blood transfusions  -would recommend medical management at this time with treatment of jim and transfusions  -goal hgb>8    Stage 3b chronic kidney disease  -per primary  -avoid nephrotoxins     H/O mechanical aortic valve replacement  -blood thinners per primary     Hyperlipidemia associated with type 2 diabetes mellitus  -statin     Coronary artery disease of bypass graft of native heart with stable angina pectoris  -medical management for now        VTE Risk Mitigation (From admission, onward)         Ordered     warfarin (COUMADIN) tablet 5 mg  Daily         12/23/22 0855     IP VTE HIGH RISK PATIENT  Once         12/21/22 1717     Place sequential compression device  Until discontinued         12/21/22 1717                Thank you for your consult. I will sign off. Please contact us if you have any additional questions.    Raudel Neal MD  Interventional Cardiology   [facility]

## 2022-12-23 NOTE — ASSESSMENT & PLAN NOTE
Recurrent Epistaxis   Thrombocytopenia  Long term AC   Supra therapeutic INR    · Bleeding persistent despite rhinorocket by ED and Afrin  · Mupirocin while packing in place   · ENT consulted, appreciate assistance   · IR consulted for embolization given recurrent nose bleeds:  Not indicated as bleeding stopped  · INR down to 3.4, goal INR 2-3  · Hgb 6.9 today, baseline around 9.  Per Cardiology, goal Hgb >8 - Will transfuse 2nd unit today given Hgb 7.3

## 2022-12-23 NOTE — PLAN OF CARE
SSC met with patient/family at bedside. Patient experience rounding completed and reviewed the following.     Do you know your discharge plan? Yes         If yes, what is the plan? Home    If you are discharging home, do you have help at home? Yes     Do you think you will need help at home at discharge?  No     Have you discussed your needs and preferences with your SW/CM? Yes     Assigned SW/CM notified of any patient/family needs or concerns.

## 2022-12-23 NOTE — PROGRESS NOTES
[facility] - [facility] [address] [place] [address] Medicine  Progress Note    Patient Name: Dariel Urbina  MRN: [id number]  Patient Class: IP- Inpatient   Admission Date: 12/21/2022  Length of Stay: 1 days  Attending Physician: Aurelia Perry MD  Primary Care Provider: Devon Langston MD        Subjective:     Principal Problem:Acute blood loss anemia        HPI:  Dariel Urbina is a 82 y.o. male with past medical history of CAD s/p CABG, PCI (11/2021), mechanical aortic valve (2014) on coumadin, HFmEF, CVA, CKD III, HTN, who presented to the ED with chest pain and epistaxis. Patient reports long-standing history of episodes of epistaxis and has had several cauterizations with ENT. Last admission for this issue was in October. Found to be supratherapeutic at warfarin clinic and was told to hold, so he has not taken it for the last 2 days.  This episode the bleeding began last night and has persisted this morning. He also c/o chronic recurrent exertional chest pain that is worse than his baseline. He says it is a central chest pressure that feels like indigestion. He endorses some lightheadedness and fatigue, as well as SOB which he says is baseline. He denies diaphoresis or nausea/ vomiting, fevers or chills, abdominal pain, N/V/D, blood in stool, blood in urine.    Patient hypertensive to 198/81 max on admit, otherwise VSS. AF. Hgb 7.8. . PT 38/ INR 4.0. Creatinine 2.5 (baseline ~1.9, though variable). . Trop 0.047 -> 0.052. EKG in NSR with 1st degree AV block. CXR stable. Rhinorocket placed in ED, though bleeding not controlled at time of my exam.         Overview/Hospital Course:  Mr. Ocasio was admitted to Hospital Medicine for management of epistaxis in the setting of a supratherapeutic INR.  He was evaluated by ENT and had rhino rockets placed.  He continued to bleed despite rhino rocket and Afrin.  IR was consulted for embolization, but then bleeding resolved so IR felt like embolization was not  needed.  He was given 1 unit PRBCs for Hgb 6.9 and INR normalized.  Troponin continued to rise to 2.197 without chest pain.  Cardiology was consulted.  They suggested full dose Aspirin and Plavix and home Warfarin for an NSTEMI, and Interventional Cardiology consult.  IC suggested medical management given renal function and goal Hgb >8.  Coreg was changed to Metoprolol and he was started on Imdur.  Cardiology wants outpatient cardiac pet and follow up outpatient.  He did have black stools on the morning of 12/23, but no further epistaxis - it seems like he swallowed blood during his epistaxis, so will hold on GI consult for now.      Interval History: No further epistaxis overnight.  Cardiology suggested full dose Aspirin and Plavix and home Warfarin for an NSTEMI, and Interventional Cardiology consult.  IC suggested medical management and goal Hgb >8.  He did have black stools on the morning of 12/23, but no further epistaxis - it seems like he swallowed blood during his epistaxis, so will hold on GI consult for now.  Wife at bedside.  Discussed increased bleeding risk with Aspirin, Plavix, Warfarin - she mentions that he has had epistaxis even with normal INR.  Plan to watch overnight, monitor renal function.    Review of Systems   Constitutional:  Negative for chills, fatigue and fever.   HENT:  Negative for nosebleeds and postnasal drip.    Respiratory:  Negative for cough and shortness of breath.    Cardiovascular:  Negative for chest pain, palpitations and leg swelling.   Gastrointestinal:  Positive for blood in stool. Negative for abdominal pain, diarrhea, nausea and vomiting.   Genitourinary:  Negative for dysuria and urgency.   Neurological:  Negative for dizziness and headaches.   All other systems reviewed and are negative.  Objective:     Vital Signs (Most Recent):  Temp: 98 °F (36.7 °C) (12/23/22 1120)  Pulse: (!) 57 (12/23/22 1120)  Resp: 17 (12/23/22 1120)  BP: 122/60 (12/23/22 1120)  SpO2: 96 %  (12/23/22 1120)   Vital Signs (24h Range):  Temp:  [97.5 °F (36.4 °C)-100.4 °F (38 °C)] 98 °F (36.7 °C)  Pulse:  [56-83] 57  Resp:  [16-18] 17  SpO2:  [90 %-98 %] 96 %  BP: ()/(49-76) 122/60     Weight: 78 kg (171 lb 15.3 oz)  Body mass index is 28.62 kg/m².    Intake/Output Summary (Last 24 hours) at 12/23/2022 1124  Last data filed at 12/22/2022 2015  Gross per 24 hour   Intake 375 ml   Output --   Net 375 ml        Physical Exam  Constitutional:       Appearance: Normal appearance. He is well-developed.   HENT:      Head: Normocephalic and atraumatic.   Cardiovascular:      Rate and Rhythm: Regular rhythm. Bradycardia present.      Heart sounds: No murmur heard.  Pulmonary:      Effort: Pulmonary effort is normal. No respiratory distress.      Breath sounds: Normal breath sounds. No wheezing or rales.   Abdominal:      General: There is no distension.      Palpations: Abdomen is soft.      Tenderness: There is no abdominal tenderness.   Musculoskeletal:         General: No deformity.   Skin:     General: Skin is warm.   Neurological:      Mental Status: He is alert.       Significant Labs: All pertinent labs within the past 24 hours have been reviewed.  CBC:   Recent Labs   Lab 12/21/22  1701 12/22/22  0856 12/23/22  0513   WBC 6.67 7.44 5.70   HGB 7.7* 6.9* 7.3*   HCT 24.4* 22.6* 23.2*   * 137* 120*       CMP:   Recent Labs   Lab 12/22/22  0856 12/23/22  0513   * 137   K 5.3* 4.8    110   CO2 19* 18*    93   BUN 90* 88*   CREATININE 2.5* 2.6*   CALCIUM 9.7 9.6   PROT 6.6 6.3   ALBUMIN 3.3* 3.2*   BILITOT 0.6 0.5   ALKPHOS 50* 50*   AST 20 19   ALT 13 12   ANIONGAP 8 9       Coagulation:   Recent Labs   Lab 12/23/22  0513   INR 2.3*   APTT 34.2*         Significant Imaging: I have reviewed all pertinent imaging results/findings within the past 24 hours.      Assessment/Plan:      * Acute blood loss anemia  Recurrent Epistaxis   Thrombocytopenia  Long term AC   Supra therapeutic  INR    · Bleeding persistent despite rhinorocket by ED and Afrin  · Mupirocin while packing in place   · ENT consulted, appreciate assistance   · IR consulted for embolization given recurrent nose bleeds:  Not indicated as bleeding stopped  · INR down to 3.4, goal INR 2-3  · Hgb 6.9 today, baseline around 9.  Per Cardiology, goal Hgb >8 - Will transfuse 2nd unit today given Hgb 7.3      Recurrent epistaxis  · See above      JIM (acute kidney injury)  · Creatinine 2.5 on admit, baseline around 1.9 - 2.6 today  Estimated Creatinine Clearance: 21.5 mL/min (A) (based on SCr of 2.6 mg/dL (H)).  · Strict I&Os  · Gentle IVF  · Avoid Nephrotoxic agents    Chest pain  · Chest pain in setting of acute bleeding and Hypertension. Exertional CP is chronic though pt reports this episode was worse than baseline.  · Trop 0.047 on admit that peaked at 2.19  · BNP elevated, but below baseline. No acute HF.   · ECG in NSR with 1st degree AV block unchanged from baseline  · Cardiology and Interventional Cardiology consulted  · Medically manage NSTEMI given renal failure  · Goal Hgb >8  · S/p Aspirin 325mg - Continue Aspirin 81mg daily  · S/p Plavix 150mg - Continue Plavix 75mg daily  · Change Coreg to Metoprolol 25mg TID  · Star Imdur 20mg TID  · Unable to use Ranexa with renal function  · Cardiac pet outpatient  · F/u with Cardiology    Supratherapeutic INR  · See above      Acute on chronic heart failure  · Does not appear to be in acute HF  ·  (lower than baseline)  · CXR clear and does not appear to be volume overloaded on exam   · Likely volume down given acute bleed and JIM   · Holding bumex    Results for orders placed during the hospital encounter of 10/05/22    Echo    Interpretation Summary  · The left ventricle is mildly enlarged with eccentric hypertrophy and mildly decreased systolic function. The estimated ejection fraction is 45%.  · There is abnormal septal wall motion consistent with post-operative status.  ·  Moderate right ventricular enlargement with normal right ventricular systolic function.  · Left ventricular diastolic dysfunction.  · Biatrial enlargement.  · There is a mechanical aortic valve present. There is no aortic insufficiency present. Prosthetic aortic valve is normal.  · The aortic valve mean gradient is 13 mmHg with a dimensionless index of 0.36.  · Mild-to-moderate mitral regurgitation.  · Mild to moderate tricuspid regurgitation.  · The estimated PA systolic pressure is 42 mmHg.  · Normal central venous pressure (3 mmHg).        Thrombocytopenia  · See above  · Transfuse Plt <50 given active bleeding    NSTEMI (non-ST elevated myocardial infarction)  · See chest pain      Bilateral carotid artery disease  · Chronic and stable  · No acute issues    Stage 3b chronic kidney disease  BMP reviewed- noted Estimated Creatinine Clearance: 21.5 mL/min (A) (based on SCr of 2.6 mg/dL (H)). according to latest data. Monitor UOP and serial BMP and adjust therapy as needed. Renally dose meds.  Baseline ~1.9.   · Likely pre-renal from dehydration and volume losses in setting of persistent epistaxis  · If no improvement with conservative measures overnight can further work up  · Consider urine lytes and renal ultrasound  · Strict I&Os  · Gentle IVF  · Avoid Nephrotoxic agents              H/O mechanical aortic valve replacement  Long term AC  · Warfarin restarted  · Goal INR 2-3      History of gout  · Chronic and stable  · Continue allopurinol      Renovascular hypertension  · Chronic issue  · Coreg changed to Metoprolol and Isosorbide started per Cards  · ARB when renal function improves      Hyperlipidemia associated with type 2 diabetes mellitus  · Chronic and stable  · Continue statin    Long term current use of anticoagulant therapy  · For mechanical AV      Coronary artery disease of bypass graft of native heart with stable angina pectoris  Carotid disease     · Continue statin   · See CP        VTE Risk  Mitigation (From admission, onward)         Ordered     warfarin (COUMADIN) tablet 5 mg  Daily         12/23/22 0855     IP VTE HIGH RISK PATIENT  Once         12/21/22 1717     Place sequential compression device  Until discontinued         12/21/22 1717                Discharge Planning   ASTER: 12/23/2022     Code Status: Full Code   Is the patient medically ready for discharge?: No    Reason for patient still in hospital (select all that apply): Patient trending condition  Discharge Plan A: Home          Critical Care Time: 35 minutes    Critical Care was time spent personally by me on the following activities: evaluating this patient's organ dysfunction, development of treatment plan, discussing treatment plan with patient or surrogate and bedside caregivers, discussions with consultants, evaluation of patient's response to treatment, examination of patient, ordering and performing treatments and interventions, ordering and review of laboratory studies, ordering and review of radiographic studies, re-evaluation of patient's condition. This critical care time did not overlap with that of any other provider or involve time for any separately billed procedures    Diagnosis requiring critical care: Anemia requiring transfusions          Aurelia Perry MD  Department of Hospital Medicine   [facility] - Observation 11H

## 2022-12-23 NOTE — ASSESSMENT & PLAN NOTE
-patient with known cad presenting with chest pressure/epistaxis   -currently with angeles and acute blood loss anemia requiring blood transfusions  -would recommend medical management at this time with treatment of angeles and transfusions  -goal hgb>8

## 2022-12-23 NOTE — TELEPHONE ENCOUNTER
----- Message from Sowmya Sue sent at 12/23/2022 10:04 AM CST -----  Contact: [phone] Patient  Patient needs a Hosp follow up appt with their PCP only.       When is the next available appointment:  03/21/2023    Symptoms:      Discharge date:12/23/2022    Needs to be seen by:12/30/2022    Would you prefer an answer via [employer]?: Call Back     Comments:

## 2022-12-23 NOTE — SUBJECTIVE & OBJECTIVE
Past Medical History:   Diagnosis Date    Anemia of chronic renal failure, stage 3 (moderate) 05/27/2015    Anticoagulant long-term use     Atherosclerosis of coronary artery bypass graft of native heart without angina pectoris 09/11/2012    3-27-18 [facility] Two vessel coronary artery disease.   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    Bilateral carotid artery disease 02/09/2017    Bleeding from the nose     Bleeding nose 03/21/2018    Cataract     CKD (chronic kidney disease) stage 3, GFR 30-59 ml/min 05/27/2015    Claudication of left lower extremity 09/17/2014    Colon polyp     Encounter for blood transfusion     Gastroesophageal reflux disease without esophagitis 03/19/2018    Gastrointestinal hemorrhage associated with intestinal diverticulosis 04/01/2018    Glaucoma     H/O mechanical aortic valve replacement 09/17/2014    History of gout 09/26/2012    Hyperparathyroidism due to renal insufficiency 07/27/2015    Internal hemorrhoid 04/03/2018    Long term current use of anticoagulant therapy 09/26/2012    Mechanical heart valve present     Metabolic acidosis with normal anion gap and bicarbonate losses 03/20/2018    Mixed hyperlipidemia 09/26/2012    NSTEMI (non-ST elevated myocardial infarction) 03/21/2018    Obesity, diabetes, and hypertension syndrome 02/23/2016    PVD (peripheral vascular disease) 09/11/2012    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    Syncope 09/29/2022    Type 2 diabetes mellitus with diabetic peripheral angiopathy without gangrene 05/27/2015    Type 2 diabetes mellitus with stage 3 chronic kidney disease, without long-term current use of insulin 10/02/2013       Past Surgical History:   Procedure Laterality Date    CARDIAC CATHETERIZATION      CARDIAC VALVE REPLACEMENT      CARDIAC VALVE SURGERY      CARPAL TUNNEL RELEASE Right 05/19/2020    Procedure: RELEASE, CARPAL TUNNEL;  Surgeon: Rupesh Norris Jr., MD;  Location: [facility];  Service: Plastics;  Laterality:  Right;    CATARACT EXTRACTION Left 11/13/2022        COLON SURGERY      COLONOSCOPY N/A 03/31/2017    Procedure: COLONOSCOPY;  Surgeon: Bruno Raymond MD;  Location: [facility] (4TH FLR);  Service: Endoscopy;  Laterality: N/A;  Patient's wife requesting date.    COLONOSCOPY N/A 04/03/2018    Procedure: COLONOSCOPY;  Surgeon: Bonifacio Pelletier MD;  Location: [facility] ENDO (2ND FLR);  Service: Endoscopy;  Laterality: N/A;    COLONOSCOPY N/A 08/13/2018    Procedure: COLONOSCOPY;  Surgeon: Kam Barba MD;  Location: [facility] ENDO (2ND FLR);  Service: Endoscopy;  Laterality: N/A;  2nd floor: PA pressure 49; hx of moderate-severe valve disease     per Coumadin clinic-Patient can hold 5 days with lovenox bridge       ok to schedule per Katarina    CORONARY ANGIOGRAPHY N/A 10/04/2021    Procedure: Left heart cath +/- peripheral angiogram;  Surgeon: Jose Ruiz MD;  Location: [facility] CATH LAB;  Service: Cardiology;  Laterality: N/A;    CORONARY ANGIOPLASTY      CORONARY ARTERY BYPASS GRAFT      CORONARY BYPASS GRAFT ANGIOGRAPHY  10/04/2021    Procedure: Bypass graft study;  Surgeon: Jose Ruiz MD;  Location: [facility] CATH LAB;  Service: Cardiology;;    HERNIA REPAIR      INTRAOCULAR PROSTHESES INSERTION Left 11/13/2022    Procedure: INSERTION, IOL PROSTHESIS;  Surgeon: Alia Mckeon MD;  Location: [facility] OR [address] [place] [address];  Service: Ophthalmology;  Laterality: Left;    INTRAOCULAR PROSTHESES INSERTION Right 12/4/2022    Procedure: INSERTION, IOL PROSTHESIS;  Surgeon: Alia Mckeon MD;  Location: [address];  Service: Ophthalmology;  Laterality: Right;    PHACOEMULSIFICATION OF CATARACT Left 11/13/2022    Procedure: PHACOEMULSIFICATION, CATARACT;  Surgeon: Alia Mckeon MD;  Location: [facility] OR [address] [place] [address];  Service: Ophthalmology;  Laterality: Left;    PHACOEMULSIFICATION OF CATARACT Right 12/4/2022    Procedure: PHACOEMULSIFICATION, CATARACT;  Surgeon: Alia Mckeon MD;  Location: [facility] OR [address] [place] [address];  Service:  Ophthalmology;  Laterality: Right;    SPINE SURGERY      VASECTOMY         Review of patient's allergies indicates:   Allergen Reactions    Fosinopril      Intolerance- elevates potassium level      Losartan      Intolerance- elevates potassium level       PTA Medications   Medication Sig    allopurinoL (ZYLOPRIM) 100 MG tablet TAKE 1 TABLET EVERY DAY    bumetanide (BUMEX) 1 MG tablet Take 1 tablet (1 mg total) by mouth once daily.    carvediloL (COREG) 12.5 MG tablet Take 1 tablet (12.5 mg total) by mouth 2 (two) times daily with meals.    ferrous gluconate (FERGON) 324 MG tablet Take 1 tablet (324 mg total) by mouth daily with breakfast.    isosorbide mononitrate (IMDUR) 120 MG 24 hr tablet Take 1 tablet (120 mg total) by mouth once daily.    lactose-reduced food (ENSURE ORAL) Take 1-2 cans by mouth once daily    lisinopriL (PRINIVIL,ZESTRIL) 2.5 MG tablet Take 1 tablet (2.5 mg total) by mouth once daily.    loperamide (IMODIUM) 2 mg capsule Take 2 mg by mouth 4 (four) times daily as needed for Diarrhea.    omega-3 acid ethyl esters (LOVAZA) 1 gram capsule Take 2 capsules (2 g total) by mouth 2 (two) times daily.    prednisolon/gatiflox/bromfenac (PREDNISOL ACE-GATIFLOX-BROMFEN) 1-0.5-0.075 % DrpS Apply 1 drop to eye 3 (three) times daily. One drop 3 times a day in surgical eye    prednisolon/gatiflox/bromfenac (PREDNISOL ACE-GATIFLOX-BROMFEN) 1-0.5-0.075 % DrpS Apply 1 drop to eye 3 (three) times daily. One drop 3 times a day in surgical eye    rosuvastatin (CRESTOR) 40 MG Tab TAKE 1 TABLET EVERY EVENING.    warfarin (COUMADIN) 5 MG tablet Take 1 tablet (5 mg) by mouth Monday, Tuesday, Wednesday, Friday & Saturday then 1.5 tablets (7.5mg) by mouth all other days (Thurs & Sun) as instructed by [facility]. (Patient taking differently: Take 1 tablet (5 mg) by mouth Monday&Friday then 1.5 tablets (7.5mg) by mouth all other days as instructed by Coumadin Clinic.)     Family History       Problem Relation (Age of  Onset)    Alcohol abuse Father    Diabetes Brother    Heart disease Mother, Father, Brother, Sister    Heart failure Mother, Father, Brother    No Known Problems Sister, Maternal Grandmother, Maternal Grandfather, Paternal Grandmother, Paternal Grandfather, Maternal Aunt, Maternal Uncle, Paternal Aunt, Paternal Uncle          Tobacco Use    Smoking status: Former     Packs/day: 1.00     Years: 20.00     Pack years: 20.00     Types: Cigarettes     Quit date: 1980     Years since quittin.3     Passive exposure: Never    Smokeless tobacco: Never   Substance and Sexual Activity    Alcohol use: No    Drug use: No    Sexual activity: Not Currently     Review of Systems   HENT:  Positive for nosebleeds.    Cardiovascular:  Positive for chest pain.   All other systems reviewed and are negative.  Objective:     Vital Signs (Most Recent):  Temp: 97.5 °F (36.4 °C) (22)  Pulse: 69 (22)  Resp: 16 (22)  BP: (!) 158/61 (22)  SpO2: (!) 94 % (22)   Vital Signs (24h Range):  Temp:  [97.5 °F (36.4 °C)-100.4 °F (38 °C)] 97.5 °F (36.4 °C)  Pulse:  [56-74] 69  Resp:  [16-18] 16  SpO2:  [90 %-98 %] 94 %  BP: ()/(49-70) 158/61     Weight: 78 kg (171 lb 15.3 oz)  Body mass index is 28.62 kg/m².    SpO2: (!) 94 %         Intake/Output Summary (Last 24 hours) at 2022  Last data filed at 2022  Gross per 24 hour   Intake 375 ml   Output --   Net 375 ml       Lines/Drains/Airways       Peripheral Intravenous Line  Duration                  Peripheral IV - Single Lumen 22 1040 18 G Left Antecubital 1 day                    Physical Exam  Vitals and nursing note reviewed.   Constitutional:       Appearance: Normal appearance.   HENT:      Head: Normocephalic and atraumatic.      Right Ear: External ear normal.      Left Ear: External ear normal.      Nose: Nose normal.      Comments: Crusted blood around nares      Mouth/Throat:      Mouth: Mucous  membranes are moist.   Eyes:      Pupils: Pupils are equal, round, and reactive to light.   Cardiovascular:      Rate and Rhythm: Normal rate and regular rhythm.      Pulses: Normal pulses.   Pulmonary:      Effort: Pulmonary effort is normal.   Abdominal:      General: Abdomen is flat.   Musculoskeletal:         General: Normal range of motion.      Cervical back: Normal range of motion.      Right lower leg: No edema.      Left lower leg: No edema.   Skin:     General: Skin is warm and dry.      Capillary Refill: Capillary refill takes less than 2 seconds.   Neurological:      General: No focal deficit present.      Mental Status: He is alert and oriented to person, place, and time.       Significant Labs: All pertinent lab results from the last 24 hours have been reviewed.    Significant Imaging:  reviewed

## 2022-12-24 LAB
ALBUMIN SERPL BCP-MCNC: 3.1 G/DL (ref 3.5–5.2)
ALP SERPL-CCNC: 64 U/L (ref 55–135)
ALT SERPL W/O P-5'-P-CCNC: 11 U/L (ref 10–44)
ANION GAP SERPL CALC-SCNC: 9 MMOL/L (ref 8–16)
AST SERPL-CCNC: 18 U/L (ref 10–40)
BASOPHILS # BLD AUTO: 0.02 K/UL (ref 0–0.2)
BASOPHILS # BLD AUTO: 0.02 K/UL (ref 0–0.2)
BASOPHILS NFR BLD: 0.3 % (ref 0–1.9)
BASOPHILS NFR BLD: 0.4 % (ref 0–1.9)
BILIRUB SERPL-MCNC: 0.5 MG/DL (ref 0.1–1)
BUN SERPL-MCNC: 82 MG/DL (ref 8–23)
CALCIUM SERPL-MCNC: 9.6 MG/DL (ref 8.7–10.5)
CHLORIDE SERPL-SCNC: 110 MMOL/L (ref 95–110)
CO2 SERPL-SCNC: 16 MMOL/L (ref 23–29)
CREAT SERPL-MCNC: 2.2 MG/DL (ref 0.5–1.4)
DIFFERENTIAL METHOD: ABNORMAL
DIFFERENTIAL METHOD: ABNORMAL
EOSINOPHIL # BLD AUTO: 0.1 K/UL (ref 0–0.5)
EOSINOPHIL # BLD AUTO: 0.2 K/UL (ref 0–0.5)
EOSINOPHIL NFR BLD: 2.4 % (ref 0–8)
EOSINOPHIL NFR BLD: 2.6 % (ref 0–8)
ERYTHROCYTE [DISTWIDTH] IN BLOOD BY AUTOMATED COUNT: 15 % (ref 11.5–14.5)
ERYTHROCYTE [DISTWIDTH] IN BLOOD BY AUTOMATED COUNT: 15.1 % (ref 11.5–14.5)
EST. GFR  (NO RACE VARIABLE): 29.4 ML/MIN/1.73 M^2
GLUCOSE SERPL-MCNC: 110 MG/DL (ref 70–110)
HCT VFR BLD AUTO: 24.3 % (ref 40–54)
HCT VFR BLD AUTO: 25 % (ref 40–54)
HGB BLD-MCNC: 7.6 G/DL (ref 14–18)
HGB BLD-MCNC: 7.8 G/DL (ref 14–18)
IMM GRANULOCYTES # BLD AUTO: 0 K/UL (ref 0–0.04)
IMM GRANULOCYTES # BLD AUTO: 0.02 K/UL (ref 0–0.04)
IMM GRANULOCYTES NFR BLD AUTO: 0 % (ref 0–0.5)
IMM GRANULOCYTES NFR BLD AUTO: 0.3 % (ref 0–0.5)
INR PPP: 1.8 (ref 0.8–1.2)
LYMPHOCYTES # BLD AUTO: 0.8 K/UL (ref 1–4.8)
LYMPHOCYTES # BLD AUTO: 1.2 K/UL (ref 1–4.8)
LYMPHOCYTES NFR BLD: 17.7 % (ref 18–48)
LYMPHOCYTES NFR BLD: 20.4 % (ref 18–48)
MAGNESIUM SERPL-MCNC: 1.9 MG/DL (ref 1.6–2.6)
MCH RBC QN AUTO: 30.4 PG (ref 27–31)
MCH RBC QN AUTO: 31.3 PG (ref 27–31)
MCHC RBC AUTO-ENTMCNC: 31.2 G/DL (ref 32–36)
MCHC RBC AUTO-ENTMCNC: 31.3 G/DL (ref 32–36)
MCV RBC AUTO: 100 FL (ref 82–98)
MCV RBC AUTO: 97 FL (ref 82–98)
MONOCYTES # BLD AUTO: 0.5 K/UL (ref 0.3–1)
MONOCYTES # BLD AUTO: 0.7 K/UL (ref 0.3–1)
MONOCYTES NFR BLD: 10.9 % (ref 4–15)
MONOCYTES NFR BLD: 11.7 % (ref 4–15)
NEUTROPHILS # BLD AUTO: 3.1 K/UL (ref 1.8–7.7)
NEUTROPHILS # BLD AUTO: 3.7 K/UL (ref 1.8–7.7)
NEUTROPHILS NFR BLD: 64.7 % (ref 38–73)
NEUTROPHILS NFR BLD: 68.6 % (ref 38–73)
NRBC BLD-RTO: 0 /100 WBC
NRBC BLD-RTO: 0 /100 WBC
PHOSPHATE SERPL-MCNC: 3.4 MG/DL (ref 2.7–4.5)
PLATELET # BLD AUTO: 108 K/UL (ref 150–450)
PLATELET # BLD AUTO: 115 K/UL (ref 150–450)
PMV BLD AUTO: 10.9 FL (ref 9.2–12.9)
PMV BLD AUTO: 11 FL (ref 9.2–12.9)
POCT GLUCOSE: 121 MG/DL (ref 70–110)
POCT GLUCOSE: 132 MG/DL (ref 70–110)
POCT GLUCOSE: 147 MG/DL (ref 70–110)
POTASSIUM SERPL-SCNC: 5 MMOL/L (ref 3.5–5.1)
PROT SERPL-MCNC: 6.3 G/DL (ref 6–8.4)
PROTHROMBIN TIME: 18.3 SEC (ref 9–12.5)
RBC # BLD AUTO: 2.43 M/UL (ref 4.6–6.2)
RBC # BLD AUTO: 2.57 M/UL (ref 4.6–6.2)
SODIUM SERPL-SCNC: 135 MMOL/L (ref 136–145)
WBC # BLD AUTO: 4.51 K/UL (ref 3.9–12.7)
WBC # BLD AUTO: 5.73 K/UL (ref 3.9–12.7)

## 2022-12-24 PROCEDURE — 84100 ASSAY OF PHOSPHORUS: CPT | Mod: HCNC | Performed by: HOSPITALIST

## 2022-12-24 PROCEDURE — 25000003 PHARM REV CODE 250: Mod: HCNC

## 2022-12-24 PROCEDURE — 99225 PR SUBSEQUENT OBSERVATION CARE,LEVEL II: ICD-10-PCS | Mod: HCNC,,, | Performed by: INTERNAL MEDICINE

## 2022-12-24 PROCEDURE — 99232 PR SUBSEQUENT HOSPITAL CARE,LEVL II: ICD-10-PCS | Mod: HCNC,,, | Performed by: INTERNAL MEDICINE

## 2022-12-24 PROCEDURE — 99232 SBSQ HOSP IP/OBS MODERATE 35: CPT | Mod: HCNC,,, | Performed by: INTERNAL MEDICINE

## 2022-12-24 PROCEDURE — 94761 N-INVAS EAR/PLS OXIMETRY MLT: CPT | Mod: HCNC

## 2022-12-24 PROCEDURE — 36415 COLL VENOUS BLD VENIPUNCTURE: CPT | Mod: HCNC | Performed by: INTERNAL MEDICINE

## 2022-12-24 PROCEDURE — 11000001 HC ACUTE MED/SURG PRIVATE ROOM: Mod: HCNC

## 2022-12-24 PROCEDURE — 25000003 PHARM REV CODE 250: Mod: HCNC | Performed by: HOSPITALIST

## 2022-12-24 PROCEDURE — 85025 COMPLETE CBC W/AUTO DIFF WBC: CPT | Mod: HCNC | Performed by: HOSPITALIST

## 2022-12-24 PROCEDURE — 85025 COMPLETE CBC W/AUTO DIFF WBC: CPT | Mod: 91,HCNC | Performed by: INTERNAL MEDICINE

## 2022-12-24 PROCEDURE — 36415 COLL VENOUS BLD VENIPUNCTURE: CPT | Mod: HCNC | Performed by: HOSPITALIST

## 2022-12-24 PROCEDURE — 99225 PR SUBSEQUENT OBSERVATION CARE,LEVEL II: CPT | Mod: HCNC,,, | Performed by: INTERNAL MEDICINE

## 2022-12-24 PROCEDURE — 80053 COMPREHEN METABOLIC PANEL: CPT | Mod: HCNC | Performed by: HOSPITALIST

## 2022-12-24 PROCEDURE — 83735 ASSAY OF MAGNESIUM: CPT | Mod: HCNC | Performed by: HOSPITALIST

## 2022-12-24 PROCEDURE — 85610 PROTHROMBIN TIME: CPT | Mod: HCNC | Performed by: HOSPITALIST

## 2022-12-24 RX ADMIN — PREDNISOLONE ACETATE-GATIFLOXACIN-BROMFENAC 1 DROP: .75; 5; 1 SUSPENSION/ DROPS OPHTHALMIC at 09:12

## 2022-12-24 RX ADMIN — ISOSORBIDE DINITRATE 20 MG: 20 TABLET ORAL at 09:12

## 2022-12-24 RX ADMIN — Medication 324 MG: at 09:12

## 2022-12-24 RX ADMIN — OMEGA-3-ACID ETHYL ESTERS 2 G: 1 CAPSULE, LIQUID FILLED ORAL at 09:12

## 2022-12-24 RX ADMIN — ALLOPURINOL 100 MG: 100 TABLET ORAL at 09:12

## 2022-12-24 RX ADMIN — ATORVASTATIN CALCIUM 80 MG: 40 TABLET, FILM COATED ORAL at 09:12

## 2022-12-24 RX ADMIN — PREDNISOLONE ACETATE-GATIFLOXACIN-BROMFENAC 1 DROP: .75; 5; 1 SUSPENSION/ DROPS OPHTHALMIC at 03:12

## 2022-12-24 RX ADMIN — OXYMETAZOLINE HCL 2 SPRAY: 0.05 SPRAY NASAL at 02:12

## 2022-12-24 RX ADMIN — ISOSORBIDE DINITRATE 20 MG: 20 TABLET ORAL at 03:12

## 2022-12-24 RX ADMIN — FAMOTIDINE 20 MG: 20 TABLET ORAL at 09:12

## 2022-12-24 NOTE — NURSING
Patient has blood pressure of 111/50, with a heart rate ranging from 49-57.  MD Bhavin notified of patient's vitals and nightly scheduled doses of lopressor and isosorbide dinitrate ordered to be held.  Will continue to monitor.

## 2022-12-24 NOTE — SUBJECTIVE & OBJECTIVE
Interval History: Pt continued to have nosebleed overnight. ASA and plavix stopped. Hgb 7.8 this AM. Denies any chest pain at rest    Review of Systems   Constitutional: Negative for fever and malaise/fatigue.   HENT:  Positive for nosebleeds.    Eyes:  Negative for blurred vision and pain.   Cardiovascular:  Positive for chest pain. Negative for dyspnea on exertion, leg swelling, orthopnea, palpitations and paroxysmal nocturnal dyspnea.   Respiratory:  Negative for cough, shortness of breath, sputum production and wheezing.    Hematologic/Lymphatic: Negative for adenopathy and bleeding problem.   Skin:  Negative for rash.   Musculoskeletal:  Negative for back pain and neck pain.   Gastrointestinal:  Negative for abdominal pain, constipation, diarrhea, nausea and vomiting.   Genitourinary:  Negative for dysuria.   Neurological:  Negative for dizziness, headaches, light-headedness and weakness.   Objective:     Vital Signs (Most Recent):  Temp: 97.4 °F (36.3 °C) (12/24/22 0819)  Pulse: 60 (12/24/22 1122)  Resp: 18 (12/24/22 1122)  BP: (!) 114/56 (12/24/22 1122)  SpO2: 96 % (12/24/22 1122)   Vital Signs (24h Range):  Temp:  [97.4 °F (36.3 °C)-98.8 °F (37.1 °C)] 97.4 °F (36.3 °C)  Pulse:  [52-64] 60  Resp:  [17-18] 18  SpO2:  [93 %-99 %] 96 %  BP: (102-134)/(49-79) 114/56     Weight: 78 kg (171 lb 15.3 oz)  Body mass index is 28.62 kg/m².     SpO2: 96 %         Intake/Output Summary (Last 24 hours) at 12/24/2022 1128  Last data filed at 12/23/2022 1330  Gross per 24 hour   Intake 257.5 ml   Output --   Net 257.5 ml       Lines/Drains/Airways       Peripheral Intravenous Line  Duration                  Peripheral IV - Single Lumen 12/21/22 1040 18 G Left Antecubital 3 days                    Physical Exam  Constitutional:       General: He is not in acute distress.     Appearance: Normal appearance. He is not ill-appearing, toxic-appearing or diaphoretic.   HENT:      Head: Normocephalic and atraumatic.      Nose:       Comments: Nasal packing present. Some dried blood present  Eyes:      Extraocular Movements: Extraocular movements intact.      Pupils: Pupils are equal, round, and reactive to light.   Cardiovascular:      Rate and Rhythm: Normal rate and regular rhythm.      Heart sounds: No murmur heard.    No friction rub. No gallop.   Pulmonary:      Effort: Pulmonary effort is normal. No respiratory distress.      Breath sounds: Normal breath sounds. No wheezing or rales.   Abdominal:      General: Abdomen is flat. There is no distension.      Palpations: Abdomen is soft. There is no mass.      Tenderness: There is no abdominal tenderness.   Musculoskeletal:         General: No swelling. Normal range of motion.      Cervical back: Normal range of motion. No rigidity.      Right lower leg: No edema.      Left lower leg: No edema.   Skin:     General: Skin is warm and dry.      Coloration: Skin is not jaundiced.      Findings: No bruising.   Neurological:      General: No focal deficit present.      Mental Status: He is alert and oriented to person, place, and time.      Cranial Nerves: No cranial nerve deficit.      Motor: No weakness.       Significant Labs: All pertinent lab results from the last 24 hours have been reviewed.    Significant Imaging: Echocardiogram: Transthoracic echo (TTE) complete (Cupid Only):   Results for orders placed or performed during the hospital encounter of 10/05/22   Echo   Result Value Ref Range    Ascending aorta 3.17 cm    STJ 2.79 cm    AV mean gradient 13 mmHg    Ao peak ashkan 2.57 m/s    Ao VTI 48.96 cm    IVRT 71.36 msec    IVS 1.05 0.6 - 1.1 cm    LA size 5.43 cm    Left Atrium Major Axis 6.31 cm    Left Atrium Minor Axis 6.81 cm    LVIDd 5.93 3.5 - 6.0 cm    LVIDs 4.32 (A) 2.1 - 4.0 cm    LVOT diameter 2.00 cm    LVOT peak VTI 17.54 cm    Posterior Wall 1.00 0.6 - 1.1 cm    MV Peak E Ashkan 1.16 m/s    RA Major Axis 5.27 cm    RA Width 5.10 cm    RVDD 5.44 cm    Sinus 3.42 cm    TAPSE 1.62 cm     TR Max Ashkan 3.11 m/s    TDI LATERAL 0.10 m/s    TDI SEPTAL 0.05 m/s    LA WIDTH 5.03 cm    LV Diastolic Volume 174.95 mL    LV Systolic Volume 83.91 mL    RV S' 9.06 cm/s    LVOT peak ashkan 0.82 m/s    LA volume (mod) 113.30 cm3    LV LATERAL E/E' RATIO 11.60 m/s    LV SEPTAL E/E' RATIO 23.20 m/s    FS 27 %    LA volume 152.08 cm3    LV mass 249.90 g    Left Ventricle Relative Wall Thickness 0.34 cm    AV valve area 1.12 cm2    AV Velocity Ratio 0.32     AV index (prosthetic) 0.36     Mean e' 0.08 m/s    LVOT area 3.1 cm2    LVOT stroke volume 55.08 cm3    AV peak gradient 26 mmHg    E/E' ratio 15.47 m/s    Triscuspid Valve Regurgitation Peak Gradient 39 mmHg    BSA 1.96 m2    LV Systolic Volume Index 43.7 mL/m2    LV Diastolic Volume Index 91.12 mL/m2    LA Volume Index 79.2 mL/m2    LV Mass Index 130 g/m2    LA Volume Index (Mod) 59.0 mL/m2    Right Atrial Pressure (from IVC) 3 mmHg    EF 45 %    TV rest pulmonary artery pressure 42 mmHg    Narrative    · The left ventricle is mildly enlarged with eccentric hypertrophy and   mildly decreased systolic function. The estimated ejection fraction is   45%.  · There is abnormal septal wall motion consistent with post-operative   status.  · Moderate right ventricular enlargement with normal right ventricular   systolic function.  · Left ventricular diastolic dysfunction.  · Biatrial enlargement.  · There is a mechanical aortic valve present. There is no aortic   insufficiency present. Prosthetic aortic valve is normal.  · The aortic valve mean gradient is 13 mmHg with a dimensionless index of   0.36.  · Mild-to-moderate mitral regurgitation.  · Mild to moderate tricuspid regurgitation.  · The estimated PA systolic pressure is 42 mmHg.  · Normal central venous pressure (3 mmHg).

## 2022-12-24 NOTE — ASSESSMENT & PLAN NOTE
Pt with chest squeezing with exertion. Trop elevated to 2. Concern for ACS at this time although picture complicated by epistaxis     Recommendations:  Can hold ASA today due to continued bleeding  Cont Metoprolol Tartrate 25 mg TID  Cont Atorvastatin 80 mg  Transfuse 1 unit pRBC for Hgb goal >8  Start Entresto 24/26 BID   Cont Isosorbide dinitrate 20 mg TID   Treat anemia as he continues to have chest pain. Hgb goal >8  Can start Ranolazine 500 mg daily (would monitor Cr with BMP in 1 week outpatient)  Outpatient PET Stress Test  F/U with Cardiology outpatient

## 2022-12-24 NOTE — ASSESSMENT & PLAN NOTE
ASHUT[facility]H [facility]guin [facility] and states pt sees ENT, c/o sore throat x1 week, supposed to see ENT on 05/31/2022. Pt did not go. RN reports that pt stated she felt like her throat was swelling shut and is much more hoarse sounding than normal. RN states MD saw pt yesterday and did not provide any new orders. RN's direct line is [phone]. Continue Warfarin with goal INR 2-3. Hold if supratherapeutic

## 2022-12-24 NOTE — PROGRESS NOTES
[facility] [address] [place] [address] Medicine  Progress Note    Patient Name: Dariel Urbina  MRN: [id number]  Patient Class: IP- Inpatient   Admission Date: 12/21/2022  Length of Stay: 2 days  Attending Physician: Lamberto Blanton MD  Primary Care Provider: Devon Langston MD        Subjective:     Principal Problem:Acute blood loss anemia    Interval History:  82 y.o. male with past medical history of Melanoma CAD s/p CABG, PCI (11/2021), mechanical aortic valve (2014) on coumadin, HFmEF and diastolic dysfunction, CVA, CKD III, HTN, who presented to the ED with chest pain and epistaxis. Patient reports long-standing history of episodes of epistaxis, more so in colder climate and has had several cauterizations with ENT. He was found to have supratherapeutic INR and his warfarin was held. He experienced chest pain and troponin elevation during hospital stay, one time reading of hb 6.9. Cardiology was consulted and patient received blood products, with aim of hb 8. ENT and Interventional Radiology was consulted, and recommendations appreciated. Patient was started on Imdur, Coreg was changed Metoprolol and Cardiology recommended Outpatient follow up.     Upon reviewing patient today. He received 2 units of blood since yesterday. Warfarin resumed yesterday. He no longer experiences any chest pain or symptoms of anemia. However at midnight, he had reoccurence of his nose bleed and had to do nasal packing with afrin, unable to quantify. INR this morning of 1.8 and his hb 7.8. Still has black stool, he admits to swallowing blood. HR 54 this am.    Review of Systems   Constitutional:  Negative for activity change and appetite change.   HENT:  Positive for nosebleeds.    Respiratory:  Negative for cough and shortness of breath.    Cardiovascular:  Negative for chest pain.   Gastrointestinal:  Negative for abdominal pain and diarrhea.   Neurological:  Negative for dizziness and headaches.   Objective:     Vital Signs (Most  Recent):  Temp: 97.4 °F (36.3 °C) (12/24/22 0819)  Pulse: (!) 54 (12/24/22 0819)  Resp: 17 (12/24/22 0819)  BP: 129/79 (12/24/22 0819)  SpO2: 99 % (12/24/22 0819)   Vital Signs (24h Range):  Temp:  [97.4 °F (36.3 °C)-98.8 °F (37.1 °C)] 97.4 °F (36.3 °C)  Pulse:  [52-83] 54  Resp:  [16-17] 17  SpO2:  [93 %-99 %] 99 %  BP: (102-158)/(49-79) 129/79     Weight: 78 kg (171 lb 15.3 oz)  Body mass index is 28.62 kg/m².    Intake/Output Summary (Last 24 hours) at 12/24/2022 0907  Last data filed at 12/23/2022 1330  Gross per 24 hour   Intake 257.5 ml   Output --   Net 257.5 ml      Physical Exam  Constitutional:       General: He is not in acute distress.     Appearance: He is obese.   HENT:      Head: Normocephalic.      Right Ear: External ear normal.      Left Ear: External ear normal.      Nose:      Comments: Nasal packing     Mouth/Throat:      Mouth: Mucous membranes are moist.   Eyes:      General: No scleral icterus.  Cardiovascular:      Rate and Rhythm: Bradycardia present.      Comments: Thoracotomy scar. Mechanical click  Pulmonary:      Breath sounds: Normal breath sounds. No wheezing or rales.   Abdominal:      Palpations: Abdomen is soft.      Tenderness: There is no abdominal tenderness.   Musculoskeletal:      Right lower leg: No edema.      Left lower leg: No edema.   Skin:     General: Skin is warm.      Comments: Melanoma to scalp   Neurological:      General: No focal deficit present.      Mental Status: He is alert and oriented to person, place, and time.   Psychiatric:         Mood and Affect: Mood normal.         Thought Content: Thought content normal.             Significant Labs: labs reviewed        Assessment/Plan:      Active Diagnoses:    Diagnosis Date Noted POA    PRINCIPAL PROBLEM:  Acute blood loss anemia [D62] 12/21/2022 Yes    Recurrent epistaxis [R04.0] 12/21/2022 Yes    Supratherapeutic INR [R79.1] 12/21/2022 Yes    Chest pain [R07.9] 12/21/2022 Yes    JIM (acute kidney injury)  [N17.9] 12/21/2022 Yes    Acute on chronic heart failure [I50.9] 10/23/2022 Yes    Thrombocytopenia [D69.6] 11/10/2021 Yes    NSTEMI (non-ST elevated myocardial infarction) [I21.4] 03/21/2018 Yes    Bilateral carotid artery disease [I77.9] 02/09/2017 Yes    Stage 3b chronic kidney disease [N18.32] 05/27/2015 Yes     Chronic    H/O mechanical aortic valve replacement [Z95.2] 09/17/2014 Not Applicable    Long term current use of anticoagulant therapy [Z79.01] 09/26/2012 Not Applicable    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  Seen by ENT and IR. Patient had bleeding again last night and currently has nasal packing. Will hold warfarin and antiplatelets at this time. Will try to get a humidifier in patient's room. Will continue to monitor bleeding and do serial cbc.  Symptomatic anemia, notably chest pain. Chest pain resolved with blood transfusion. Probable demand ischemia NSTEMI. Underlying history of significant Coronary artery disease. Seen by Cardiology. Will have to hold antiplatlets and beta blocker at this time  Asymptomatic bradycardia. Beta blocker held, continue to monitor on tele  Mechanical Aortic Valve on Warfarin  Mild thrombocytopenia  Melanoma. Outpatient follow up  Diastolic HF and mildy reduced EF. Stable    DVT prophylaxis: SCD  Fall precautions     Melanoma    Lamberto Blanton MD  Department of Hospital Medicine   [facility]

## 2022-12-24 NOTE — PROGRESS NOTES
[facility]  Cardiology  Progress Note    Patient Name: Dariel Urbina  MRN: [id number]  Admission Date: 12/21/2022  Hospital Length of Stay: 2 days  Code Status: Full Code   Attending Physician: Lamberto Blanton MD   Primary Care Physician: Devon Langston MD  Expected Discharge Date: 12/23/2022  Principal Problem:Acute blood loss anemia    Subjective:     Hospital Course:   No notes on file    Interval History: Pt continued to have nosebleed overnight. ASA and plavix stopped. Hgb 7.8 this AM. Denies any chest pain at rest    Review of Systems   Constitutional: Negative for fever and malaise/fatigue.   HENT:  Positive for nosebleeds.    Eyes:  Negative for blurred vision and pain.   Cardiovascular:  Positive for chest pain. Negative for dyspnea on exertion, leg swelling, orthopnea, palpitations and paroxysmal nocturnal dyspnea.   Respiratory:  Negative for cough, shortness of breath, sputum production and wheezing.    Hematologic/Lymphatic: Negative for adenopathy and bleeding problem.   Skin:  Negative for rash.   Musculoskeletal:  Negative for back pain and neck pain.   Gastrointestinal:  Negative for abdominal pain, constipation, diarrhea, nausea and vomiting.   Genitourinary:  Negative for dysuria.   Neurological:  Negative for dizziness, headaches, light-headedness and weakness.   Objective:     Vital Signs (Most Recent):  Temp: 97.4 °F (36.3 °C) (12/24/22 0819)  Pulse: 60 (12/24/22 1122)  Resp: 18 (12/24/22 1122)  BP: (!) 114/56 (12/24/22 1122)  SpO2: 96 % (12/24/22 1122)   Vital Signs (24h Range):  Temp:  [97.4 °F (36.3 °C)-98.8 °F (37.1 °C)] 97.4 °F (36.3 °C)  Pulse:  [52-64] 60  Resp:  [17-18] 18  SpO2:  [93 %-99 %] 96 %  BP: (102-134)/(49-79) 114/56     Weight: 78 kg (171 lb 15.3 oz)  Body mass index is 28.62 kg/m².     SpO2: 96 %         Intake/Output Summary (Last 24 hours) at 12/24/2022 1128  Last data filed at 12/23/2022 1330  Gross per 24 hour   Intake 257.5 ml   Output --   Net 257.5 ml        Lines/Drains/Airways       Peripheral Intravenous Line  Duration                  Peripheral IV - Single Lumen 12/21/22 1040 18 G Left Antecubital 3 days                    Physical Exam  Constitutional:       General: He is not in acute distress.     Appearance: Normal appearance. He is not ill-appearing, toxic-appearing or diaphoretic.   HENT:      Head: Normocephalic and atraumatic.      Nose:      Comments: Nasal packing present. Some dried blood present  Eyes:      Extraocular Movements: Extraocular movements intact.      Pupils: Pupils are equal, round, and reactive to light.   Cardiovascular:      Rate and Rhythm: Normal rate and regular rhythm.      Heart sounds: No murmur heard.    No friction rub. No gallop.   Pulmonary:      Effort: Pulmonary effort is normal. No respiratory distress.      Breath sounds: Normal breath sounds. No wheezing or rales.   Abdominal:      General: Abdomen is flat. There is no distension.      Palpations: Abdomen is soft. There is no mass.      Tenderness: There is no abdominal tenderness.   Musculoskeletal:         General: No swelling. Normal range of motion.      Cervical back: Normal range of motion. No rigidity.      Right lower leg: No edema.      Left lower leg: No edema.   Skin:     General: Skin is warm and dry.      Coloration: Skin is not jaundiced.      Findings: No bruising.   Neurological:      General: No focal deficit present.      Mental Status: He is alert and oriented to person, place, and time.      Cranial Nerves: No cranial nerve deficit.      Motor: No weakness.       Significant Labs: All pertinent lab results from the last 24 hours have been reviewed.    Significant Imaging: Echocardiogram: Transthoracic echo (TTE) complete (Cupid Only):   Results for orders placed or performed during the hospital encounter of 10/05/22   Echo   Result Value Ref Range    Ascending aorta 3.17 cm    STJ 2.79 cm    AV mean gradient 13 mmHg    Ao peak lydia 2.57 m/s    Ao  VTI 48.96 cm    IVRT 71.36 msec    IVS 1.05 0.6 - 1.1 cm    LA size 5.43 cm    Left Atrium Major Axis 6.31 cm    Left Atrium Minor Axis 6.81 cm    LVIDd 5.93 3.5 - 6.0 cm    LVIDs 4.32 (A) 2.1 - 4.0 cm    LVOT diameter 2.00 cm    LVOT peak VTI 17.54 cm    Posterior Wall 1.00 0.6 - 1.1 cm    MV Peak E Ashkan 1.16 m/s    RA Major Axis 5.27 cm    RA Width 5.10 cm    RVDD 5.44 cm    Sinus 3.42 cm    TAPSE 1.62 cm    TR Max Ashkan 3.11 m/s    TDI LATERAL 0.10 m/s    TDI SEPTAL 0.05 m/s    LA WIDTH 5.03 cm    LV Diastolic Volume 174.95 mL    LV Systolic Volume 83.91 mL    RV S' 9.06 cm/s    LVOT peak ashkan 0.82 m/s    LA volume (mod) 113.30 cm3    LV LATERAL E/E' RATIO 11.60 m/s    LV SEPTAL E/E' RATIO 23.20 m/s    FS 27 %    LA volume 152.08 cm3    LV mass 249.90 g    Left Ventricle Relative Wall Thickness 0.34 cm    AV valve area 1.12 cm2    AV Velocity Ratio 0.32     AV index (prosthetic) 0.36     Mean e' 0.08 m/s    LVOT area 3.1 cm2    LVOT stroke volume 55.08 cm3    AV peak gradient 26 mmHg    E/E' ratio 15.47 m/s    Triscuspid Valve Regurgitation Peak Gradient 39 mmHg    BSA 1.96 m2    LV Systolic Volume Index 43.7 mL/m2    LV Diastolic Volume Index 91.12 mL/m2    LA Volume Index 79.2 mL/m2    LV Mass Index 130 g/m2    LA Volume Index (Mod) 59.0 mL/m2    Right Atrial Pressure (from IVC) 3 mmHg    EF 45 %    TV rest pulmonary artery pressure 42 mmHg    Narrative    · The left ventricle is mildly enlarged with eccentric hypertrophy and   mildly decreased systolic function. The estimated ejection fraction is   45%.  · There is abnormal septal wall motion consistent with post-operative   status.  · Moderate right ventricular enlargement with normal right ventricular   systolic function.  · Left ventricular diastolic dysfunction.  · Biatrial enlargement.  · There is a mechanical aortic valve present. There is no aortic   insufficiency present. Prosthetic aortic valve is normal.  · The aortic valve mean gradient is 13 mmHg with  a dimensionless index of   0.36.  · Mild-to-moderate mitral regurgitation.  · Mild to moderate tricuspid regurgitation.  · The estimated PA systolic pressure is 42 mmHg.  · Normal central venous pressure (3 mmHg).        Assessment and Plan:     NSTEMI (non-ST elevated myocardial infarction)  Pt with chest squeezing with exertion. Trop elevated to 2. Concern for ACS at this time although picture complicated by epistaxis     Recommendations:  Hold ASA today due to epistaxis  Cont Metoprolol Tartrate 25 mg TID  Cont Atorvastatin 80 mg  Transfuse 1 unit pRBC for Hgb goal >8  Restart ARB when JIM resolves  Cont Isosorbide dinitrate 20 mg TID   Treat anemia as he continues to have chest pain. Hgb goal >8  Can start Ranolazine 500 mg daily (would monitor Cr with BMP in 1 week outpatient)  Outpatient PET Stress Test  F/U with Cardiology outpatient      H/O mechanical aortic valve replacement  Continue Warfarin with goal INR 2-3. Hold if supratherapeutic        VTE Risk Mitigation (From admission, onward)           Ordered     Place sequential compression device  Until discontinued         12/24/22 0930     IP VTE HIGH RISK PATIENT  Once         12/21/22 1717     Place sequential compression device  Until discontinued         12/21/22 1717                    Louis Wynn MD  Cardiology  [facility]

## 2022-12-24 NOTE — PROGRESS NOTES
Patient reviewed. Nasal packing was removed and bleeding has subsided, he however stated that he may have tasted a little blood and was a little concern. No significant fall in hb. Will continue to monitor CBC. If bleeding ceases and hb stable will aim to resume Warfarin per Cardiology recommendations.

## 2022-12-24 NOTE — NURSING
Patient is having an episode of epistaxis.  Dr. Daksha DO notified of patient condition.  Afrin equivalent ordered to be administered.  Medication administered for 2 doses without positive effect.  DO Daksha  notified of continued bleeding.  AM CBC order verified, and aspirin, plavix, and warfarin ordered to be held.  DO Daksha stated that AM team will be notified of epistaxis overnight in order to discuss need for embolization by IR.  Will continue to monitor.

## 2022-12-25 PROBLEM — D62 ACUTE BLOOD LOSS ANEMIA: Status: RESOLVED | Noted: 2022-12-21 | Resolved: 2022-12-25

## 2022-12-25 PROBLEM — N17.9 AKI (ACUTE KIDNEY INJURY): Status: RESOLVED | Noted: 2022-12-21 | Resolved: 2022-12-25

## 2022-12-25 PROBLEM — R04.0 EPISTAXIS: Status: RESOLVED | Noted: 2022-12-21 | Resolved: 2022-12-25

## 2022-12-25 PROBLEM — I50.9 ACUTE ON CHRONIC HEART FAILURE: Status: RESOLVED | Noted: 2022-10-23 | Resolved: 2022-12-25

## 2022-12-25 PROBLEM — R79.1 SUPRATHERAPEUTIC INR: Status: RESOLVED | Noted: 2022-12-21 | Resolved: 2022-12-25

## 2022-12-25 PROBLEM — R07.9 CHEST PAIN: Status: RESOLVED | Noted: 2022-12-21 | Resolved: 2022-12-25

## 2022-12-25 LAB
ALBUMIN SERPL BCP-MCNC: 2.9 G/DL (ref 3.5–5.2)
ALP SERPL-CCNC: 54 U/L (ref 55–135)
ALT SERPL W/O P-5'-P-CCNC: 12 U/L (ref 10–44)
ANION GAP SERPL CALC-SCNC: 5 MMOL/L (ref 8–16)
AST SERPL-CCNC: 15 U/L (ref 10–40)
BASOPHILS # BLD AUTO: 0.02 K/UL (ref 0–0.2)
BASOPHILS # BLD AUTO: 0.03 K/UL (ref 0–0.2)
BASOPHILS # BLD AUTO: 0.03 K/UL (ref 0–0.2)
BASOPHILS NFR BLD: 0.4 % (ref 0–1.9)
BASOPHILS NFR BLD: 0.5 % (ref 0–1.9)
BASOPHILS NFR BLD: 0.6 % (ref 0–1.9)
BILIRUB SERPL-MCNC: 0.6 MG/DL (ref 0.1–1)
BUN SERPL-MCNC: 70 MG/DL (ref 8–23)
CALCIUM SERPL-MCNC: 9 MG/DL (ref 8.7–10.5)
CHLORIDE SERPL-SCNC: 115 MMOL/L (ref 95–110)
CO2 SERPL-SCNC: 19 MMOL/L (ref 23–29)
CREAT SERPL-MCNC: 2 MG/DL (ref 0.5–1.4)
DIFFERENTIAL METHOD: ABNORMAL
EOSINOPHIL # BLD AUTO: 0.2 K/UL (ref 0–0.5)
EOSINOPHIL NFR BLD: 2.4 % (ref 0–8)
EOSINOPHIL NFR BLD: 3 % (ref 0–8)
EOSINOPHIL NFR BLD: 3.2 % (ref 0–8)
EOSINOPHIL NFR BLD: 4 % (ref 0–8)
EOSINOPHIL NFR BLD: 4 % (ref 0–8)
ERYTHROCYTE [DISTWIDTH] IN BLOOD BY AUTOMATED COUNT: 15.1 % (ref 11.5–14.5)
ERYTHROCYTE [DISTWIDTH] IN BLOOD BY AUTOMATED COUNT: 15.1 % (ref 11.5–14.5)
ERYTHROCYTE [DISTWIDTH] IN BLOOD BY AUTOMATED COUNT: 15.2 % (ref 11.5–14.5)
EST. GFR  (NO RACE VARIABLE): 32.9 ML/MIN/1.73 M^2
GLUCOSE SERPL-MCNC: 76 MG/DL (ref 70–110)
HCT VFR BLD AUTO: 22.4 % (ref 40–54)
HCT VFR BLD AUTO: 22.6 % (ref 40–54)
HCT VFR BLD AUTO: 22.6 % (ref 40–54)
HCT VFR BLD AUTO: 23 % (ref 40–54)
HCT VFR BLD AUTO: 23.1 % (ref 40–54)
HGB BLD-MCNC: 7 G/DL (ref 14–18)
HGB BLD-MCNC: 7 G/DL (ref 14–18)
HGB BLD-MCNC: 7.1 G/DL (ref 14–18)
HGB BLD-MCNC: 7.2 G/DL (ref 14–18)
HGB BLD-MCNC: 7.3 G/DL (ref 14–18)
IMM GRANULOCYTES # BLD AUTO: 0.01 K/UL (ref 0–0.04)
IMM GRANULOCYTES # BLD AUTO: 0.02 K/UL (ref 0–0.04)
IMM GRANULOCYTES # BLD AUTO: 0.02 K/UL (ref 0–0.04)
IMM GRANULOCYTES NFR BLD AUTO: 0.2 % (ref 0–0.5)
IMM GRANULOCYTES NFR BLD AUTO: 0.3 % (ref 0–0.5)
IMM GRANULOCYTES NFR BLD AUTO: 0.4 % (ref 0–0.5)
INR PPP: 1.7 (ref 0.8–1.2)
INR PPP: 1.7 (ref 0.8–1.2)
LYMPHOCYTES # BLD AUTO: 0.9 K/UL (ref 1–4.8)
LYMPHOCYTES # BLD AUTO: 1 K/UL (ref 1–4.8)
LYMPHOCYTES NFR BLD: 15.5 % (ref 18–48)
LYMPHOCYTES NFR BLD: 16.3 % (ref 18–48)
LYMPHOCYTES NFR BLD: 20.1 % (ref 18–48)
LYMPHOCYTES NFR BLD: 22.7 % (ref 18–48)
LYMPHOCYTES NFR BLD: 22.7 % (ref 18–48)
MAGNESIUM SERPL-MCNC: 1.8 MG/DL (ref 1.6–2.6)
MCH RBC QN AUTO: 30.3 PG (ref 27–31)
MCH RBC QN AUTO: 30.8 PG (ref 27–31)
MCH RBC QN AUTO: 30.8 PG (ref 27–31)
MCH RBC QN AUTO: 30.9 PG (ref 27–31)
MCH RBC QN AUTO: 31.4 PG (ref 27–31)
MCHC RBC AUTO-ENTMCNC: 30.9 G/DL (ref 32–36)
MCHC RBC AUTO-ENTMCNC: 31 G/DL (ref 32–36)
MCHC RBC AUTO-ENTMCNC: 31 G/DL (ref 32–36)
MCHC RBC AUTO-ENTMCNC: 31.6 G/DL (ref 32–36)
MCHC RBC AUTO-ENTMCNC: 32.1 G/DL (ref 32–36)
MCV RBC AUTO: 100 FL (ref 82–98)
MCV RBC AUTO: 100 FL (ref 82–98)
MCV RBC AUTO: 98 FL (ref 82–98)
MONOCYTES # BLD AUTO: 0.6 K/UL (ref 0.3–1)
MONOCYTES # BLD AUTO: 0.6 K/UL (ref 0.3–1)
MONOCYTES # BLD AUTO: 0.7 K/UL (ref 0.3–1)
MONOCYTES NFR BLD: 10.7 % (ref 4–15)
MONOCYTES NFR BLD: 12.2 % (ref 4–15)
MONOCYTES NFR BLD: 13.2 % (ref 4–15)
MONOCYTES NFR BLD: 14.1 % (ref 4–15)
MONOCYTES NFR BLD: 14.1 % (ref 4–15)
NEUTROPHILS # BLD AUTO: 2.7 K/UL (ref 1.8–7.7)
NEUTROPHILS # BLD AUTO: 2.7 K/UL (ref 1.8–7.7)
NEUTROPHILS # BLD AUTO: 3.2 K/UL (ref 1.8–7.7)
NEUTROPHILS # BLD AUTO: 3.6 K/UL (ref 1.8–7.7)
NEUTROPHILS # BLD AUTO: 4.4 K/UL (ref 1.8–7.7)
NEUTROPHILS NFR BLD: 58.6 % (ref 38–73)
NEUTROPHILS NFR BLD: 58.6 % (ref 38–73)
NEUTROPHILS NFR BLD: 63.1 % (ref 38–73)
NEUTROPHILS NFR BLD: 67.3 % (ref 38–73)
NEUTROPHILS NFR BLD: 70.6 % (ref 38–73)
NRBC BLD-RTO: 0 /100 WBC
PHOSPHATE SERPL-MCNC: 2.8 MG/DL (ref 2.7–4.5)
PLATELET # BLD AUTO: 103 K/UL (ref 150–450)
PLATELET # BLD AUTO: 103 K/UL (ref 150–450)
PLATELET # BLD AUTO: 111 K/UL (ref 150–450)
PLATELET # BLD AUTO: 116 K/UL (ref 150–450)
PLATELET # BLD AUTO: 121 K/UL (ref 150–450)
PMV BLD AUTO: 10.8 FL (ref 9.2–12.9)
PMV BLD AUTO: 11.2 FL (ref 9.2–12.9)
PMV BLD AUTO: 11.4 FL (ref 9.2–12.9)
POCT GLUCOSE: 111 MG/DL (ref 70–110)
POCT GLUCOSE: 124 MG/DL (ref 70–110)
POCT GLUCOSE: 181 MG/DL (ref 70–110)
POCT GLUCOSE: 92 MG/DL (ref 70–110)
POTASSIUM SERPL-SCNC: 4.9 MMOL/L (ref 3.5–5.1)
PROT SERPL-MCNC: 5.6 G/DL (ref 6–8.4)
PROTHROMBIN TIME: 17.5 SEC (ref 9–12.5)
PROTHROMBIN TIME: 17.5 SEC (ref 9–12.5)
RBC # BLD AUTO: 2.27 M/UL (ref 4.6–6.2)
RBC # BLD AUTO: 2.27 M/UL (ref 4.6–6.2)
RBC # BLD AUTO: 2.29 M/UL (ref 4.6–6.2)
RBC # BLD AUTO: 2.34 M/UL (ref 4.6–6.2)
RBC # BLD AUTO: 2.36 M/UL (ref 4.6–6.2)
SODIUM SERPL-SCNC: 139 MMOL/L (ref 136–145)
WBC # BLD AUTO: 4.53 K/UL (ref 3.9–12.7)
WBC # BLD AUTO: 4.53 K/UL (ref 3.9–12.7)
WBC # BLD AUTO: 5.07 K/UL (ref 3.9–12.7)
WBC # BLD AUTO: 5.34 K/UL (ref 3.9–12.7)
WBC # BLD AUTO: 6.18 K/UL (ref 3.9–12.7)

## 2022-12-25 PROCEDURE — 84100 ASSAY OF PHOSPHORUS: CPT | Mod: HCNC | Performed by: HOSPITALIST

## 2022-12-25 PROCEDURE — 94761 N-INVAS EAR/PLS OXIMETRY MLT: CPT | Mod: HCNC

## 2022-12-25 PROCEDURE — 85610 PROTHROMBIN TIME: CPT | Mod: HCNC | Performed by: HOSPITALIST

## 2022-12-25 PROCEDURE — 25000003 PHARM REV CODE 250: Mod: HCNC | Performed by: HOSPITALIST

## 2022-12-25 PROCEDURE — 83735 ASSAY OF MAGNESIUM: CPT | Mod: HCNC | Performed by: HOSPITALIST

## 2022-12-25 PROCEDURE — 25000003 PHARM REV CODE 250: Mod: HCNC

## 2022-12-25 PROCEDURE — 99232 PR SUBSEQUENT HOSPITAL CARE,LEVL II: ICD-10-PCS | Mod: HCNC,,, | Performed by: INTERNAL MEDICINE

## 2022-12-25 PROCEDURE — 99232 SBSQ HOSP IP/OBS MODERATE 35: CPT | Mod: HCNC,,, | Performed by: INTERNAL MEDICINE

## 2022-12-25 PROCEDURE — 99222 PR INITIAL HOSPITAL CARE,LEVL II: ICD-10-PCS | Mod: HCNC,,, | Performed by: STUDENT IN AN ORGANIZED HEALTH CARE EDUCATION/TRAINING PROGRAM

## 2022-12-25 PROCEDURE — 99222 1ST HOSP IP/OBS MODERATE 55: CPT | Mod: HCNC,,, | Performed by: STUDENT IN AN ORGANIZED HEALTH CARE EDUCATION/TRAINING PROGRAM

## 2022-12-25 PROCEDURE — 99225 PR SUBSEQUENT OBSERVATION CARE,LEVEL II: ICD-10-PCS | Mod: HCNC,,, | Performed by: INTERNAL MEDICINE

## 2022-12-25 PROCEDURE — 85025 COMPLETE CBC W/AUTO DIFF WBC: CPT | Mod: 91,HCNC | Performed by: INTERNAL MEDICINE

## 2022-12-25 PROCEDURE — 36415 COLL VENOUS BLD VENIPUNCTURE: CPT | Mod: HCNC | Performed by: HOSPITALIST

## 2022-12-25 PROCEDURE — 85025 COMPLETE CBC W/AUTO DIFF WBC: CPT | Mod: HCNC | Performed by: HOSPITALIST

## 2022-12-25 PROCEDURE — 36415 COLL VENOUS BLD VENIPUNCTURE: CPT | Mod: HCNC | Performed by: INTERNAL MEDICINE

## 2022-12-25 PROCEDURE — 11000001 HC ACUTE MED/SURG PRIVATE ROOM: Mod: HCNC

## 2022-12-25 PROCEDURE — 99225 PR SUBSEQUENT OBSERVATION CARE,LEVEL II: CPT | Mod: HCNC,,, | Performed by: INTERNAL MEDICINE

## 2022-12-25 PROCEDURE — 80053 COMPREHEN METABOLIC PANEL: CPT | Mod: HCNC | Performed by: HOSPITALIST

## 2022-12-25 RX ORDER — WARFARIN SODIUM 5 MG/1
5 TABLET ORAL DAILY
Qty: 30 TABLET | Refills: 11 | Status: ON HOLD
Start: 2022-12-25 | End: 2022-12-31 | Stop reason: SDUPTHER

## 2022-12-25 RX ADMIN — PREDNISOLONE ACETATE-GATIFLOXACIN-BROMFENAC 1 DROP: .75; 5; 1 SUSPENSION/ DROPS OPHTHALMIC at 03:12

## 2022-12-25 RX ADMIN — ISOSORBIDE DINITRATE 20 MG: 20 TABLET ORAL at 08:12

## 2022-12-25 RX ADMIN — FAMOTIDINE 20 MG: 20 TABLET ORAL at 08:12

## 2022-12-25 RX ADMIN — PREDNISOLONE ACETATE-GATIFLOXACIN-BROMFENAC 1 DROP: .75; 5; 1 SUSPENSION/ DROPS OPHTHALMIC at 09:12

## 2022-12-25 RX ADMIN — ISOSORBIDE DINITRATE 20 MG: 20 TABLET ORAL at 05:12

## 2022-12-25 RX ADMIN — ALLOPURINOL 100 MG: 100 TABLET ORAL at 08:12

## 2022-12-25 RX ADMIN — ISOSORBIDE DINITRATE 20 MG: 20 TABLET ORAL at 09:12

## 2022-12-25 RX ADMIN — ATORVASTATIN CALCIUM 80 MG: 40 TABLET, FILM COATED ORAL at 09:12

## 2022-12-25 RX ADMIN — Medication 324 MG: at 08:12

## 2022-12-25 RX ADMIN — OMEGA-3-ACID ETHYL ESTERS 2 G: 1 CAPSULE, LIQUID FILLED ORAL at 09:12

## 2022-12-25 RX ADMIN — OMEGA-3-ACID ETHYL ESTERS 2 G: 1 CAPSULE, LIQUID FILLED ORAL at 08:12

## 2022-12-25 NOTE — PROGRESS NOTES
[facility]  Cardiology  Progress Note    Patient Name: Dariel Urbina  MRN: [id number]  Admission Date: 12/21/2022  Hospital Length of Stay: 3 days  Code Status: Full Code   Attending Physician: Lamberto Blanton MD   Primary Care Physician: Devon Langston MD  Expected Discharge Date: 12/23/2022  Principal Problem:Epistaxis    Subjective:       Dariel Urbina is a 82 y.o. male with past medical history of CAD s/p CABG, PCI (11/2021 with SILVIO to ostial LCX and prox LCX. Ost LAD lesion 80% stenosed, patent LIMA to LAD), mechanical aortic valve (2014) on coumadin, HFmEF, CVA, CKD III, HTN, who presented to the ED with chest pain and epistaxis. Patient reports long-standing history of episodes of epistaxis and has had several cauterizations with ENT. Last admission for this issue was in October. Found to be supratherapeutic at warfarin clinic and was told to hold, so he has not taken it for the last 2 days.  This episode the bleeding began 12/20/22 and has persisted this morning. He also c/o chronic recurrent exertional chest pain that is worse than his baseline. He says it is a central chest pressure that feels like his chest is being squeezed when he exerts himself.      AF. MERT in 110s-190s. Hgb 7.8 from 9.8. Creatinine 2.5 (baseline ~1.9, though variable). . Trop 0.047 -> 0.052. Troponin continued to rise to 2.197 without chest pain. ECG without St changes.       He was evaluated by ENT and had rhino rockets placed.    Was seen by Cardiology on 12/22/22  for NSTEMI and interventional cardiology were for medical management. As patient has angeles no recs for further interventions.      Review of Systems   Constitutional: Negative for fever and malaise/fatigue.   HENT:  Positive for nosebleeds.    Eyes:  Negative for blurred vision and pain.   Cardiovascular:  Positive for chest pain. Negative for dyspnea on exertion, leg swelling, orthopnea, palpitations and paroxysmal nocturnal dyspnea.  "  Respiratory:  Negative for cough, shortness of breath, sputum production and wheezing.    Hematologic/Lymphatic: Negative for adenopathy and bleeding problem.   Skin:  Negative for rash.   Musculoskeletal:  Negative for back pain and neck pain.   Gastrointestinal:  Negative for abdominal pain, constipation, diarrhea, nausea and vomiting.   Genitourinary:  Negative for dysuria.   Neurological:  Negative for dizziness      Objective:       /68 (Patient Position: Sitting)   Pulse 75   Temp 98.1 °F (36.7 °C) (Oral)   Resp 18   Ht 5' 5" (1.651 m)   Wt 78 kg (171 lb 15.3 oz)   SpO2 97%   BMI 28.62 kg/m²     Physical Exam  Constitutional:       Appearance: Normal appearance.   HENT:      Head: Atraumatic.   Cardiovascular:      Rate and Rhythm: Normal rate.   Pulmonary:      Effort: Pulmonary effort is normal. No respiratory distress.   Abdominal:      General: Abdomen is flat. Bowel sounds are normal.      Palpations: Abdomen is soft.   Musculoskeletal:      Cervical back: Neck supple.   Skin:     Capillary Refill: Capillary refill takes less than 2 seconds.   Neurological:      Mental Status: He is oriented to person, place, and time.      Assessment and Plan:     NSTEMI (non-ST elevated myocardial infarction)  Pt with chest squeezing with exertion. Trop elevated to 2. Concern for ACS at this time although picture complicated by epistaxis. Has one more epistaxis this am.     Recommendations:  Hold coumadin for today in the setting of  recurrent epistaxis this am (AVR has a lower risk of embolization), and need for tx ijn the past days.   Cont Metoprolol Tartrate 25 mg TID  Cont Atorvastatin 80 mg  Cont Isosorbide dinitrate 20 mg TID   Transfuse pRBC for Hgb goal >8  Restart ARB when JIM resolves  Hold Ranolazine  (would monitor Cr with BMP in 1 week outpatient)  Outpatient PET Stress Test  F/U with Cardiology outpatient    H/O mechanical aortic valve replacement  Will recommend to hold warfarin today in " the setting of epistaxis in AM. Thereafter continue Warfarin with goal INR 2-3.       VTE Risk Mitigation (From admission, onward)           Ordered     Place sequential compression device  Until discontinued         12/24/22 0930     IP VTE HIGH RISK PATIENT  Once         12/21/22 1717     Place sequential compression device  Until discontinued         12/21/22 1717                  Pt seen, examined and discussed with the attending physician, Dr. Ramirez of the cardiology consult service.   Current plan of care discussed with the patient, all questions answered.   Current plan of care discussed with primary service.     Saeed Tanner MD  Cardiology  [facility]

## 2022-12-25 NOTE — PROGRESS NOTES
[facility] [address] [place] [address] Medicine  Progress Note    Patient Name: Dariel Urbina  MRN: [id number]  Patient Class: IP- Inpatient   Admission Date: 12/21/2022  Length of Stay: 3 days  Attending Physician: Lamberto Blanton MD  Primary Care Provider: Devon Langston MD        Subjective:     Principal Problem:Epistaxis    Interval History: 82 y.o. male with past medical history of Melanoma CAD s/p CABG, PCI (11/2021), mechanical aortic valve (2014) on coumadin, HFmEF and diastolic dysfunction, CVA, CKD III, HTN, who presented to the ED with chest pain and epistaxis. Patient reports long-standing history of episodes of epistaxis, more so in colder climate and has had several cauterizations with ENT. He was found to have supratherapeutic INR and his warfarin was held. He experienced chest pain and troponin elevation during hospital stay, one time reading of hb 6.9. Cardiology was consulted and patient received blood products, with aim of hb 8. ENT and Interventional Radiology was consulted, and recommendations appreciated. Patient was started on Imdur, beta blocker was held due to bradycardia. Chest pain resolved with blood transfusion. Cardiology recommended Outpatient follow up.      Upon reviewing patient today. Patient had nose bleed this am. Hb fell from 7.8 to 7. Denies any chest pain or symptoms of anemia. INR 1.7 Plt 103. Low normal pulse noted    Review of Systems   HENT:  Positive for nosebleeds.    Respiratory:  Negative for chest tightness and shortness of breath.    Cardiovascular:  Negative for chest pain.   Objective:     Vital Signs (Most Recent):  Temp: 97.8 °F (36.6 °C) (12/25/22 0748)  Pulse: 68 (12/25/22 0748)  Resp: 18 (12/25/22 0748)  BP: (!) 154/68 (12/25/22 0748)  SpO2: 95 % (12/25/22 0748)   Vital Signs (24h Range):  Temp:  [97.4 °F (36.3 °C)-98.7 °F (37.1 °C)] 97.8 °F (36.6 °C)  Pulse:  [54-70] 68  Resp:  [17-18] 18  SpO2:  [95 %-99 %] 95 %  BP: (114-154)/(55-85) 154/68     Weight:  78 kg (171 lb 15.3 oz)  Body mass index is 28.62 kg/m².  No intake or output data in the 24 hours ending 12/25/22 0806   Physical Exam  Constitutional:       General: He is not in acute distress.     Appearance: He is obese.   HENT:      Head: Normocephalic.      Right Ear: External ear normal.      Left Ear: External ear normal.      Nose:      Comments: Mild epistaxis  Cardiovascular:      Comments: Low normal pulse  Pulmonary:      Effort: Pulmonary effort is normal.   Abdominal:      Palpations: Abdomen is soft.      Tenderness: There is no abdominal tenderness.   Musculoskeletal:      Right lower leg: No edema.      Left lower leg: No edema.   Skin:     General: Skin is warm.   Neurological:      General: No focal deficit present.      Mental Status: He is alert and oriented to person, place, and time.   Psychiatric:         Mood and Affect: Mood normal.             Significant Labs: All pertinent labs within the past 24 hours have been reviewed.        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] 09/26/2012 Yes    History of gout [Z87.39] 09/26/2012 Yes    Hyperlipidemia associated with type 2 diabetes mellitus [E11.69, E78.5] 09/26/2012 Yes      Problems Resolved During this Admission:    Diagnosis Date Noted Date Resolved POA    Acute blood loss anemia [D62] 12/21/2022 12/25/2022 Yes    Supratherapeutic INR [R79.1] 12/21/2022 12/25/2022 Yes    Chest pain [R07.9] 12/21/2022 12/25/2022 Yes    JIM (acute kidney injury)  [N17.9] 12/21/2022 12/25/2022 Yes    Acute on chronic heart failure [I50.9] 10/23/2022 12/25/2022 Yes    Coronary artery disease of bypass graft of native heart with stable angina pectoris [I25.708] 09/11/2012 12/25/2022 Yes     VTE Risk Mitigation (From admission, onward)           Ordered     Place sequential compression device  Until discontinued         12/24/22 0930     IP VTE HIGH RISK PATIENT  Once         12/21/22 1717     Place sequential compression device  Until discontinued         12/21/22 1717                  Recurrent Epistaxis.  Seen by ENT and IR. Patient has recurrent bleeding. He is off anticoagulation and antiplatelets. He had bleeding again today. Will hold warfarin and antiplatelets at this time(last plavix received at 8:14 am on 12/23/22). Will do nasal packing and reconsult ENT.Will continue to do serial CBC. Continue to hold anticoagulation and antiplatelets  Symptomatic anemia, notably chest pain resolved. Probable demand ischemia NSTEMI. Underlying history of significant Coronary artery disease. Seen by Cardiology, outpatient follow up. Will have to hold antiplatlets and beta blocker at this time. Agree with their input, warfarin would take priority when reintroducing anticoagulation/antiplatelets.  Asymptomatic bradycardia. Beta blocker held, continue to monitor on tele  Mechanical Aortic Valve   Mild thrombocytopenia, chronic  Melanoma. Outpatient follow up  Diastolic HF and mildy reduced EF. Stable, because of ongoing bleed, will hold diuretics for now.     DVT prophylaxis: SCD  Fall precautions    Lamberto Blanton MD  Department of Hospital Medicine   [facility]

## 2022-12-25 NOTE — ASSESSMENT & PLAN NOTE
The patient is an 82 year old male with recurrent epistaxis s/p multiple cauterizations in the office, now with recurrent bleeding. ENT consulted upon admit 12/21. Now 12/25 ENT reconsulted this admission for recurrent bleeding. Packed bilateral nasal cavities once active oozing identified bilaterally. Of note patient with thrombocytopenia and hypertension    L nasal cavity: nasopore with surgical  R nasal cavity: fibrillar, surgicel      - Bilateral absorbable packing will auto-dissolve  - Nasal saline q4h while packing in place; recommend continued Afrin q4h x24 hours with mustache dressing placed to absorb any minor oozing from nose around packs; ok if patient has some oozing around packs  - Afrin to bilateral nares if epistaxis recurs, see detailed instructions below  - Keep head of bed elevated  - Recommend tight control of BP   - Consider platelet replacement if appropriate given recurrent bleeds  - Please call ENT with questions, reassessed this afternoon with epistaxis controlled   - Will reassess again in AM and provide final recommendations  - AC/AP therapy per primary team  - Remainder of care per primary team        PATIENT INSTRUCTIONS:    Nasal precautions  - DO NOT blow your nose for 2 weeks. The only exception is that you may lightly blow your nose after using a sinus rinse.  - Sneeze/cough with mouth open  - Avoid irritating substances that might make you sneeze, such as dust, chalk, harsh chemicals, and allergic triggers. This might also include spicy foods.  - Do not smoke   - Avoid flying or swimming for 2 weeks.    - Avoid all heavy lifting, straining or bending for 2 weeks.   - Avoid semi-contact sports or vigorous exercising for 3-4 weeks.      Aftercare/ moisturizing recommendations to decrease frequency of nosebleeds:  - Daily nasal saline sprays/rinses  - Nightly application of vaseline/aquaphor to anterior nares  - Room humidification  - Avoidance of nasal cannula if possible (always with  humidification and please use face tent, nasal cup, facemask if possible)  Management of medical conditions contributing to epistaxis (coagulopathy, hypertension, etc.)  - Humidification of CPAP appliance if applicable    If rebleeding occurs:   - Apply Afrin liberally to both nostrils  - Apply pressure over the soft part of the nose for 15 minutes (clip or digital pressure, important to have pressure over soft part of nose and consistent pressure (do not release pressure at any point))  - Instruct patient to lean forward, avoid swallowing blood. This can cause nausea and vomiting  - Can repeat these steps above up to 3 times  - Call/reconsult ENT or return to the nearest emergency department if this is unsuccessful

## 2022-12-25 NOTE — SUBJECTIVE & OBJECTIVE
Medications:  Continuous Infusions:  Scheduled Meds:   allopurinoL  100 mg Oral Daily    atorvastatin  80 mg Oral QHS    famotidine  20 mg Oral Daily    ferrous gluconate  324 mg Oral Daily with breakfast    isosorbide dinitrate  20 mg Oral TID    omega-3 acid ethyl esters  2 g Oral BID    prednisol ace-gatiflox-bromfen  1 drop Ophthalmic TID    sodium chloride 0.9%  500 mL Intravenous Once     PRN Meds:sodium chloride, sodium chloride, acetaminophen, albuterol-ipratropium, ALPRAZolam, aluminum-magnesium hydroxide-simethicone, dextrose 10%, dextrose 10%, glucagon (human recombinant), glucose, glucose, hydrALAZINE, loperamide, naloxone, ondansetron, prochlorperazine     Current Facility-Administered Medications on File Prior to Encounter   Medication    ondansetron injection 4 mg    ondansetron injection 4 mg    phenylephrine HCL 2.5% ophthalmic solution 1 drop    phenylephrine HCL 2.5% ophthalmic solution 1 drop    proparacaine 0.5 % ophthalmic solution 1 drop    proparacaine 0.5 % ophthalmic solution 1 drop    tropicamide 1% ophthalmic solution 1 drop    tropicamide 1% ophthalmic solution 1 drop     Current Outpatient Medications on File Prior to Encounter   Medication Sig    allopurinoL (ZYLOPRIM) 100 MG tablet TAKE 1 TABLET EVERY DAY    bumetanide (BUMEX) 1 MG tablet Take 1 tablet (1 mg total) by mouth once daily.    ferrous gluconate (FERGON) 324 MG tablet Take 1 tablet (324 mg total) by mouth daily with breakfast.    isosorbide mononitrate (IMDUR) 120 MG 24 hr tablet Take 1 tablet (120 mg total) by mouth once daily.    lisinopriL (PRINIVIL,ZESTRIL) 2.5 MG tablet Take 1 tablet (2.5 mg total) by mouth once daily.    loperamide (IMODIUM) 2 mg capsule Take 2 mg by mouth 4 (four) times daily as needed for Diarrhea.    oxymetazoline (AFRIN) 0.05 % nasal spray 2 sprays by Nasal route daily as needed (nose bleed).    prednisolon/gatiflox/bromfenac (PREDNISOL ACE-GATIFLOX-BROMFEN) 1-0.5-0.075 % DrpS Apply 1 drop to eye  3 (three) times daily. One drop 3 times a day in surgical eye    rosuvastatin (CRESTOR) 40 MG Tab TAKE 1 TABLET EVERY EVENING.    [DISCONTINUED] carvediloL (COREG) 12.5 MG tablet Take 1 tablet (12.5 mg total) by mouth 2 (two) times daily with meals.    [DISCONTINUED] warfarin (COUMADIN) 5 MG tablet Take 1 tablet (5 mg) by mouth Monday, Tuesday, Wednesday, Friday & Saturday then 1.5 tablets (7.5mg) by mouth all other days (Thurs & Sun) as instructed by Coumadin Clinic. (Patient taking differently: Take 1 tablet (5 mg) by mouth Monday&Friday then 1.5 tablets (7.5mg) by mouth all other days as instructed by Coumadin Clinic.)       Review of patient's allergies indicates:   Allergen Reactions    Fosinopril      Intolerance- elevates potassium level      Losartan      Intolerance- elevates potassium level       Past Medical History:   Diagnosis Date    Anemia of chronic renal failure, stage 3 (moderate) 05/27/2015    Anticoagulant long-term use     Atherosclerosis of coronary artery bypass graft of native heart without angina pectoris 09/11/2012    3-27-18 [facility] Two vessel coronary artery disease.   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    Bilateral carotid artery disease 02/09/2017    Bleeding from the nose     Bleeding nose 03/21/2018    Cataract     CKD (chronic kidney disease) stage 3, GFR 30-59 ml/min 05/27/2015    Claudication of left lower extremity 09/17/2014    Colon polyp     Encounter for blood transfusion     Gastroesophageal reflux disease without esophagitis 03/19/2018    Gastrointestinal hemorrhage associated with intestinal diverticulosis 04/01/2018    Glaucoma     H/O mechanical aortic valve replacement 09/17/2014    History of gout 09/26/2012    Hyperparathyroidism due to renal insufficiency 07/27/2015    Internal hemorrhoid 04/03/2018    Long term current use of anticoagulant therapy 09/26/2012    Mechanical heart valve present     Metabolic acidosis with normal anion gap and  bicarbonate losses 03/20/2018    Mixed hyperlipidemia 09/26/2012    NSTEMI (non-ST elevated myocardial infarction) 03/21/2018    Obesity, diabetes, and hypertension syndrome 02/23/2016    PVD (peripheral vascular disease) 09/11/2012    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    Syncope 09/29/2022    Type 2 diabetes mellitus with diabetic peripheral angiopathy without gangrene 05/27/2015    Type 2 diabetes mellitus with stage 3 chronic kidney disease, without long-term current use of insulin 10/02/2013     Past Surgical History:   Procedure Laterality Date    CARDIAC CATHETERIZATION      CARDIAC VALVE REPLACEMENT      CARDIAC VALVE SURGERY      CARPAL TUNNEL RELEASE Right 05/19/2020    Procedure: RELEASE, CARPAL TUNNEL;  Surgeon: Rupesh Norris Jr., MD;  Location: [facility];  Service: Plastics;  Laterality: Right;    CATARACT EXTRACTION Left 11/13/2022        COLON SURGERY      COLONOSCOPY N/A 03/31/2017    Procedure: COLONOSCOPY;  Surgeon: Bruno Raymond MD;  Location: [facility] (4TH FLR);  Service: Endoscopy;  Laterality: N/A;  Patient's wife requesting date.    COLONOSCOPY N/A 04/03/2018    Procedure: COLONOSCOPY;  Surgeon: Bonifacio Pelletier MD;  Location: [facility] (2ND FLR);  Service: Endoscopy;  Laterality: N/A;    COLONOSCOPY N/A 08/13/2018    Procedure: COLONOSCOPY;  Surgeon: Kam Barba MD;  Location: [facility] (2ND FLR);  Service: Endoscopy;  Laterality: N/A;  2nd floor: PA pressure 49; hx of moderate-severe valve disease     per Coumadin clinic-Patient can hold 5 days with lovenox bridge       ok to schedule per Katarina    CORONARY ANGIOGRAPHY N/A 10/04/2021    Procedure: Left heart cath +/- peripheral angiogram;  Surgeon: Jose Ruiz MD;  Location: [facility] CATH LAB;  Service: Cardiology;  Laterality: N/A;    CORONARY ANGIOPLASTY      CORONARY ARTERY BYPASS GRAFT      CORONARY BYPASS GRAFT ANGIOGRAPHY  10/04/2021    Procedure: Bypass graft study;  Surgeon: Jose Ruiz MD;  Location: [facility]  CATH LAB;  Service: Cardiology;;    HERNIA REPAIR      INTRAOCULAR PROSTHESES INSERTION Left 2022    Procedure: INSERTION, IOL PROSTHESIS;  Surgeon: Alia Mckeon MD;  Location: [facility] OR [address] [place] [address];  Service: Ophthalmology;  Laterality: Left;    INTRAOCULAR PROSTHESES INSERTION Right 2022    Procedure: INSERTION, IOL PROSTHESIS;  Surgeon: Alia Mckeon MD;  Location: [facility] OR [address] [place] [address];  Service: Ophthalmology;  Laterality: Right;    PHACOEMULSIFICATION OF CATARACT Left 2022    Procedure: PHACOEMULSIFICATION, CATARACT;  Surgeon: Alia Mckeon MD;  Location: [facility] OR [address] [place] [address];  Service: Ophthalmology;  Laterality: Left;    PHACOEMULSIFICATION OF CATARACT Right 2022    Procedure: PHACOEMULSIFICATION, CATARACT;  Surgeon: Alia Mckeon MD;  Location: [facility] OR [address] [place] [address];  Service: Ophthalmology;  Laterality: Right;    SPINE SURGERY      VASECTOMY       Family History       Problem Relation (Age of Onset)    Alcohol abuse Father    Diabetes Brother    Heart disease Mother, Father, Brother, Sister    Heart failure Mother, Father, Brother    No Known Problems Sister, Maternal Grandmother, Maternal Grandfather, Paternal Grandmother, Paternal Grandfather, Maternal Aunt, Maternal Uncle, Paternal Aunt, Paternal Uncle          Tobacco Use    Smoking status: Former     Packs/day: 1.00     Years: 20.00     Pack years: 20.00     Types: Cigarettes     Quit date: 1980     Years since quittin.3     Passive exposure: Never    Smokeless tobacco: Never   Substance and Sexual Activity    Alcohol use: No    Drug use: No    Sexual activity: Not Currently     Review of Systems   HENT:  Positive for nosebleeds and postnasal drip. Negative for sore throat, trouble swallowing and voice change.    Respiratory:  Negative for cough, choking and shortness of breath.    Objective:     Vital Signs (Most Recent):  Temp: 97.8 °F (36.6 °C) (22)  Pulse: 68 (22)  Resp: 18 (22)  BP: (!)  154/68 (12/25/22 0748)  SpO2: 95 % (12/25/22 0748)   Vital Signs (24h Range):  Temp:  [97.4 °F (36.3 °C)-98.7 °F (37.1 °C)] 97.8 °F (36.6 °C)  Pulse:  [58-70] 68  Resp:  [17-18] 18  SpO2:  [95 %-98 %] 95 %  BP: (116-154)/(55-85) 154/68     Weight: 78 kg (171 lb 15.3 oz)  Body mass index is 28.62 kg/m².        Physical Exam  Bilateral oozing epistaxis noted, see procedure note  Bright red blood in oropharynx    Procedure: Control of epistaxis -- Chemical cauterization + nasal packing  After obtaining consent from the patient/family, anterior rhinoscopy performed. An active bleed was identified at the left anterior nasal septum in Little's area with diffuse oozing along anterior nasal septal mucosa. A large blood clot with some residual nasal packing was suctioned out of the left nasal cavity. Nasal pack consisting of nasopore in surgicel was placed to stop the bleed. At the conclusion of the procedure, no active bleeding around the nasal pack was seen. In left nasal cavity area of prominent vasculature along right anterior nasal septum with minimal active oozing. Silver nitrate was applied to the affected areas in anterior nasal septum for 3 seconds. The patient tolerated the procedure well. The septal mucosa appeared gray as expected without active bleeding at the end of the procedure at area of cauterization, per patient request right side packed with fibrillar and surgicel to ensure adequate tamponade bilaterally.       L nasal cavity: nasopore with surgical  R nasal cavity: fibrillar, surgicel          Significant Labs:  CBC:   Recent Labs   Lab 12/25/22 0213   WBC 4.53  4.53   RBC 2.27*  2.27*   HGB 7.0*  7.0*   HCT 22.6*  22.6*   *  103*   *  100*   MCH 30.8  30.8   MCHC 31.0*  31.0*

## 2022-12-25 NOTE — CONSULTS
[facility]  Otorhinolaryngology-Head & Neck Surgery  Consult Note    Patient Name: Dariel Urbina  MRN: [id number]  Code Status: Full Code  Admission Date: 12/21/2022  Hospital Length of Stay: 3 days  Attending Physician: Lamberto Blanton MD  Primary Care Provider: Devon Langston MD    Patient information was obtained from patient, spouse/SO, past medical records and ER records.     Inpatient consult to ENT  Consult performed by: Yesenia Cross MD  Consult ordered by: Lamberto Blanton MD        Subjective:     Chief Complaint/Reason for Admission: Chest pain    History of Present Illness: The patient is an 82 year old male who presents to the ED with continued epistaxis since 10:30 pm 12/20/22. The ED placed a rapid rhino but the patient reports continued bleeding, so ENT was consulted. The patient reports a history of recurrent epistaxis and was cauterized in the office with Dr. Patel 11/30/22 with electrocautery to left Little's area. He denies any aggravating factors to the epistaxis.       12/25/22: ENT reconsulted as pt had epistaxis this AM. Patient endorses lifelong hx of recurrent epistaxis. This morning notes that the packing placed earlier this hospitalization he believes fell out entirely shortly after it was placed though bleeding was minor until this AM when it began steadily dripping from primarily the left side.       Medications:  Continuous Infusions:  Scheduled Meds:   allopurinoL  100 mg Oral Daily    atorvastatin  80 mg Oral QHS    famotidine  20 mg Oral Daily    ferrous gluconate  324 mg Oral Daily with breakfast    isosorbide dinitrate  20 mg Oral TID    omega-3 acid ethyl esters  2 g Oral BID    prednisol ace-gatiflox-bromfen  1 drop Ophthalmic TID    sodium chloride 0.9%  500 mL Intravenous Once     PRN Meds:sodium chloride, sodium chloride, acetaminophen, albuterol-ipratropium, ALPRAZolam, aluminum-magnesium hydroxide-simethicone, dextrose 10%, dextrose 10%, glucagon (human  recombinant), glucose, glucose, hydrALAZINE, loperamide, naloxone, ondansetron, prochlorperazine     Current Facility-Administered Medications on File Prior to Encounter   Medication    ondansetron injection 4 mg    ondansetron injection 4 mg    phenylephrine HCL 2.5% ophthalmic solution 1 drop    phenylephrine HCL 2.5% ophthalmic solution 1 drop    proparacaine 0.5 % ophthalmic solution 1 drop    proparacaine 0.5 % ophthalmic solution 1 drop    tropicamide 1% ophthalmic solution 1 drop    tropicamide 1% ophthalmic solution 1 drop     Current Outpatient Medications on File Prior to Encounter   Medication Sig    allopurinoL (ZYLOPRIM) 100 MG tablet TAKE 1 TABLET EVERY DAY    bumetanide (BUMEX) 1 MG tablet Take 1 tablet (1 mg total) by mouth once daily.    ferrous gluconate (FERGON) 324 MG tablet Take 1 tablet (324 mg total) by mouth daily with breakfast.    isosorbide mononitrate (IMDUR) 120 MG 24 hr tablet Take 1 tablet (120 mg total) by mouth once daily.    lisinopriL (PRINIVIL,ZESTRIL) 2.5 MG tablet Take 1 tablet (2.5 mg total) by mouth once daily.    loperamide (IMODIUM) 2 mg capsule Take 2 mg by mouth 4 (four) times daily as needed for Diarrhea.    oxymetazoline (AFRIN) 0.05 % nasal spray 2 sprays by Nasal route daily as needed (nose bleed).    prednisolon/gatiflox/bromfenac (PREDNISOL ACE-GATIFLOX-BROMFEN) 1-0.5-0.075 % DrpS Apply 1 drop to eye 3 (three) times daily. One drop 3 times a day in surgical eye    rosuvastatin (CRESTOR) 40 MG Tab TAKE 1 TABLET EVERY EVENING.    [DISCONTINUED] carvediloL (COREG) 12.5 MG tablet Take 1 tablet (12.5 mg total) by mouth 2 (two) times daily with meals.    [DISCONTINUED] warfarin (COUMADIN) 5 MG tablet Take 1 tablet (5 mg) by mouth Monday, Tuesday, Wednesday, Friday & Saturday then 1.5 tablets (7.5mg) by mouth all other days (Thurs & Sun) as instructed by [facility]. (Patient taking differently: Take 1 tablet (5 mg) by mouth Monday&Friday then 1.5  tablets (7.5mg) by mouth all other days as instructed by [facility].)       Review of patient's allergies indicates:   Allergen Reactions    Fosinopril      Intolerance- elevates potassium level      Losartan      Intolerance- elevates potassium level       Past Medical History:   Diagnosis Date    Anemia of chronic renal failure, stage 3 (moderate) 05/27/2015    Anticoagulant long-term use     Atherosclerosis of coronary artery bypass graft of native heart without angina pectoris 09/11/2012    3-27-18 [facility] Two vessel coronary artery disease.   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    Bilateral carotid artery disease 02/09/2017    Bleeding from the nose     Bleeding nose 03/21/2018    Cataract     CKD (chronic kidney disease) stage 3, GFR 30-59 ml/min 05/27/2015    Claudication of left lower extremity 09/17/2014    Colon polyp     Encounter for blood transfusion     Gastroesophageal reflux disease without esophagitis 03/19/2018    Gastrointestinal hemorrhage associated with intestinal diverticulosis 04/01/2018    Glaucoma     H/O mechanical aortic valve replacement 09/17/2014    History of gout 09/26/2012    Hyperparathyroidism due to renal insufficiency 07/27/2015    Internal hemorrhoid 04/03/2018    Long term current use of anticoagulant therapy 09/26/2012    Mechanical heart valve present     Metabolic acidosis with normal anion gap and bicarbonate losses 03/20/2018    Mixed hyperlipidemia 09/26/2012    NSTEMI (non-ST elevated myocardial infarction) 03/21/2018    Obesity, diabetes, and hypertension syndrome 02/23/2016    PVD (peripheral vascular disease) 09/11/2012    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    Syncope 09/29/2022    Type 2 diabetes mellitus with diabetic peripheral angiopathy without gangrene 05/27/2015    Type 2 diabetes mellitus with stage 3 chronic kidney disease, without long-term current use of insulin 10/02/2013     Past Surgical  History:   Procedure Laterality Date    CARDIAC CATHETERIZATION      CARDIAC VALVE REPLACEMENT      CARDIAC VALVE SURGERY      CARPAL TUNNEL RELEASE Right 05/19/2020    Procedure: RELEASE, CARPAL TUNNEL;  Surgeon: Rupesh Norris Jr., MD;  Location: [facility];  Service: Plastics;  Laterality: Right;    CATARACT EXTRACTION Left 11/13/2022        COLON SURGERY      COLONOSCOPY N/A 03/31/2017    Procedure: COLONOSCOPY;  Surgeon: Bruno Raymond MD;  Location: [facility] (4TH FLR);  Service: Endoscopy;  Laterality: N/A;  Patient's wife requesting date.    COLONOSCOPY N/A 04/03/2018    Procedure: COLONOSCOPY;  Surgeon: Bonifacio Pelletier MD;  Location: [facility] ENDO (2ND FLR);  Service: Endoscopy;  Laterality: N/A;    COLONOSCOPY N/A 08/13/2018    Procedure: COLONOSCOPY;  Surgeon: Kam Barba MD;  Location: [facility] ENDO (2ND FLR);  Service: Endoscopy;  Laterality: N/A;  2nd floor: PA pressure 49; hx of moderate-severe valve disease     per Coumadin clinic-Patient can hold 5 days with lovenox bridge       ok to schedule per Katarina    CORONARY ANGIOGRAPHY N/A 10/04/2021    Procedure: Left heart cath +/- peripheral angiogram;  Surgeon: Jose Ruiz MD;  Location: [facility] CATH LAB;  Service: Cardiology;  Laterality: N/A;    CORONARY ANGIOPLASTY      CORONARY ARTERY BYPASS GRAFT      CORONARY BYPASS GRAFT ANGIOGRAPHY  10/04/2021    Procedure: Bypass graft study;  Surgeon: Jose Ruiz MD;  Location: [facility] CATH LAB;  Service: Cardiology;;    HERNIA REPAIR      INTRAOCULAR PROSTHESES INSERTION Left 11/13/2022    Procedure: INSERTION, IOL PROSTHESIS;  Surgeon: Alia Mckeon MD;  [address]  Service: Ophthalmology;  Laterality: Left;    INTRAOCULAR PROSTHESES INSERTION Right 12/4/2022    Procedure: INSERTION, IOL PROSTHESIS;  Surgeon: Alia Mckeon MD;  Location: [address];  Service: Ophthalmology;  Laterality: Right;    PHACOEMULSIFICATION OF CATARACT Left 11/13/2022     Procedure: PHACOEMULSIFICATION, CATARACT;  Surgeon: Alia Mckeon MD;  Location: [facility] OR [address] [place] [address];  Service: Ophthalmology;  Laterality: Left;    PHACOEMULSIFICATION OF CATARACT Right 2022    Procedure: PHACOEMULSIFICATION, CATARACT;  Surgeon: Alia Mckeon MD;  Location: [facility] OR [address] [place] [address];  Service: Ophthalmology;  Laterality: Right;    SPINE SURGERY      VASECTOMY       Family History       Problem Relation (Age of Onset)    Alcohol abuse Father    Diabetes Brother    Heart disease Mother, Father, Brother, Sister    Heart failure Mother, Father, Brother    No Known Problems Sister, Maternal Grandmother, Maternal Grandfather, Paternal Grandmother, Paternal Grandfather, Maternal Aunt, Maternal Uncle, Paternal Aunt, Paternal Uncle          Tobacco Use    Smoking status: Former     Packs/day: 1.00     Years: 20.00     Pack years: 20.00     Types: Cigarettes     Quit date: 1980     Years since quittin.3     Passive exposure: Never    Smokeless tobacco: Never   Substance and Sexual Activity    Alcohol use: No    Drug use: No    Sexual activity: Not Currently     Review of Systems   HENT:  Positive for nosebleeds and postnasal drip. Negative for sore throat, trouble swallowing and voice change.    Respiratory:  Negative for cough, choking and shortness of breath.    Objective:     Vital Signs (Most Recent):  Temp: 97.8 °F (36.6 °C) (22 0748)  Pulse: 68 (22 0748)  Resp: 18 (22 0748)  BP: (!) 154/68 (22 0748)  SpO2: 95 % (22 0748)   Vital Signs (24h Range):  Temp:  [97.4 °F (36.3 °C)-98.7 °F (37.1 °C)] 97.8 °F (36.6 °C)  Pulse:  [58-70] 68  Resp:  [17-18] 18  SpO2:  [95 %-98 %] 95 %  BP: (116-154)/(55-85) 154/68     Weight: 78 kg (171 lb 15.3 oz)  Body mass index is 28.62 kg/m².        Physical Exam  Bilateral oozing epistaxis noted, see procedure note  Bright red blood in oropharynx    Procedure: Control of epistaxis -- Chemical cauterization + nasal packing  After  obtaining consent from the patient/family, anterior rhinoscopy performed. An active bleed was identified at the left anterior nasal septum in Little's area with diffuse oozing along anterior nasal septal mucosa. A large blood clot with some residual nasal packing was suctioned out of the left nasal cavity. Nasal pack consisting of nasopore in surgicel was placed to stop the bleed. At the conclusion of the procedure, no active bleeding around the nasal pack was seen. In left nasal cavity area of prominent vasculature along right anterior nasal septum with minimal active oozing. Silver nitrate was applied to the affected areas in anterior nasal septum for 3 seconds. The patient tolerated the procedure well. The septal mucosa appeared gray as expected without active bleeding at the end of the procedure at area of cauterization, per patient request right side packed with fibrillar and surgicel to ensure adequate tamponade bilaterally.       L nasal cavity: nasopore with surgical  R nasal cavity: fibrillar, surgicel          Significant Labs:  CBC:   Recent Labs   Lab 12/25/22  0213   WBC 4.53  4.53   RBC 2.27*  2.27*   HGB 7.0*  7.0*   HCT 22.6*  22.6*   *  103*   *  100*   MCH 30.8  30.8   MCHC 31.0*  31.0*             Assessment/Plan:     * Epistaxis  The patient is an 82 year old male with recurrent epistaxis s/p multiple cauterizations in the office, now with recurrent bleeding. ENT consulted upon admit 12/21. Now 12/25 ENT reconsulted this admission for recurrent bleeding. Packed bilateral nasal cavities once active oozing identified bilaterally. Of note patient with thrombocytopenia and hypertension    L nasal cavity: nasopore with surgical  R nasal cavity: fibrillar, surgicel      - Bilateral absorbable packing will auto-dissolve  - Nasal saline q4h while packing in place; recommend continued Afrin q4h x24 hours with mustache dressing placed to absorb any minor oozing from nose around packs;  ok if patient has some oozing around packs  - Afrin to bilateral nares if epistaxis recurs, see detailed instructions below  - Keep head of bed elevated  - Recommend tight control of BP   - Consider platelet replacement if appropriate given recurrent bleeds  - Please call ENT with questions, reassessed this afternoon with epistaxis controlled   - Will reassess again in AM and provide final recommendations  - AC/AP therapy per primary team  - Remainder of care per primary team        PATIENT INSTRUCTIONS:    Nasal precautions  - DO NOT blow your nose for 2 weeks. The only exception is that you may lightly blow your nose after using a sinus rinse.  - Sneeze/cough with mouth open  - Avoid irritating substances that might make you sneeze, such as dust, chalk, harsh chemicals, and allergic triggers. This might also include spicy foods.  - Do not smoke   - Avoid flying or swimming for 2 weeks.    - Avoid all heavy lifting, straining or bending for 2 weeks.   - Avoid semi-contact sports or vigorous exercising for 3-4 weeks.      Aftercare/ moisturizing recommendations to decrease frequency of nosebleeds:  - Daily nasal saline sprays/rinses  - Nightly application of vaseline/aquaphor to anterior nares  - Room humidification  - Avoidance of nasal cannula if possible (always with humidification and please use face tent, nasal cup, facemask if possible)  Management of medical conditions contributing to epistaxis (coagulopathy, hypertension, etc.)  - Humidification of CPAP appliance if applicable    If rebleeding occurs:   - Apply Afrin liberally to both nostrils  - Apply pressure over the soft part of the nose for 15 minutes (clip or digital pressure, important to have pressure over soft part of nose and consistent pressure (do not release pressure at any point))  - Instruct patient to lean forward, avoid swallowing blood. This can cause nausea and vomiting  - Can repeat these steps above up to 3 times  - Call/reconsult ENT or  return to the nearest emergency department if this is unsuccessful            VTE Risk Mitigation (From admission, onward)         Ordered     Place sequential compression device  Until discontinued         12/24/22 0930     IP VTE HIGH RISK PATIENT  Once         12/21/22 1717     Place sequential compression device  Until discontinued         12/21/22 1717                Thank you for your consult. I will follow-up with patient. Please contact us if you have any additional questions.    Yesenia Cross MD  Otorhinolaryngology-Head & Neck Surgery  [facility]

## 2022-12-26 LAB
BASOPHILS # BLD AUTO: 0.04 K/UL (ref 0–0.2)
BASOPHILS NFR BLD: 0.9 % (ref 0–1.9)
DIFFERENTIAL METHOD: ABNORMAL
EOSINOPHIL # BLD AUTO: 0.2 K/UL (ref 0–0.5)
EOSINOPHIL NFR BLD: 3.7 % (ref 0–8)
ERYTHROCYTE [DISTWIDTH] IN BLOOD BY AUTOMATED COUNT: 15.4 % (ref 11.5–14.5)
HCT VFR BLD AUTO: 23.6 % (ref 40–54)
HGB BLD-MCNC: 7.1 G/DL (ref 14–18)
IMM GRANULOCYTES # BLD AUTO: 0.01 K/UL (ref 0–0.04)
IMM GRANULOCYTES NFR BLD AUTO: 0.2 % (ref 0–0.5)
INR PPP: 1.5 (ref 0.8–1.2)
LYMPHOCYTES # BLD AUTO: 1.1 K/UL (ref 1–4.8)
LYMPHOCYTES NFR BLD: 24.1 % (ref 18–48)
MCH RBC QN AUTO: 30.1 PG (ref 27–31)
MCHC RBC AUTO-ENTMCNC: 30.1 G/DL (ref 32–36)
MCV RBC AUTO: 100 FL (ref 82–98)
MONOCYTES # BLD AUTO: 0.5 K/UL (ref 0.3–1)
MONOCYTES NFR BLD: 11.2 % (ref 4–15)
NEUTROPHILS # BLD AUTO: 2.8 K/UL (ref 1.8–7.7)
NEUTROPHILS NFR BLD: 59.9 % (ref 38–73)
NRBC BLD-RTO: 0 /100 WBC
PLATELET # BLD AUTO: 128 K/UL (ref 150–450)
PMV BLD AUTO: 11.4 FL (ref 9.2–12.9)
POCT GLUCOSE: 206 MG/DL (ref 70–110)
PROTHROMBIN TIME: 15 SEC (ref 9–12.5)
RBC # BLD AUTO: 2.36 M/UL (ref 4.6–6.2)
WBC # BLD AUTO: 4.64 K/UL (ref 3.9–12.7)

## 2022-12-26 PROCEDURE — 99232 SBSQ HOSP IP/OBS MODERATE 35: CPT | Mod: HCNC,,, | Performed by: INTERNAL MEDICINE

## 2022-12-26 PROCEDURE — 99232 PR SUBSEQUENT HOSPITAL CARE,LEVL II: ICD-10-PCS | Mod: HCNC,,, | Performed by: INTERNAL MEDICINE

## 2022-12-26 PROCEDURE — 11000001 HC ACUTE MED/SURG PRIVATE ROOM: Mod: HCNC

## 2022-12-26 PROCEDURE — 85610 PROTHROMBIN TIME: CPT | Mod: HCNC | Performed by: HOSPITALIST

## 2022-12-26 PROCEDURE — 99225 PR SUBSEQUENT OBSERVATION CARE,LEVEL II: CPT | Mod: HCNC,,, | Performed by: INTERNAL MEDICINE

## 2022-12-26 PROCEDURE — 99225 PR SUBSEQUENT OBSERVATION CARE,LEVEL II: ICD-10-PCS | Mod: HCNC,,, | Performed by: INTERNAL MEDICINE

## 2022-12-26 PROCEDURE — 36415 COLL VENOUS BLD VENIPUNCTURE: CPT | Mod: HCNC | Performed by: HOSPITALIST

## 2022-12-26 PROCEDURE — 94761 N-INVAS EAR/PLS OXIMETRY MLT: CPT | Mod: HCNC

## 2022-12-26 PROCEDURE — 85025 COMPLETE CBC W/AUTO DIFF WBC: CPT | Mod: HCNC | Performed by: INTERNAL MEDICINE

## 2022-12-26 PROCEDURE — 25000003 PHARM REV CODE 250: Mod: HCNC

## 2022-12-26 PROCEDURE — 25000003 PHARM REV CODE 250: Mod: HCNC | Performed by: HOSPITALIST

## 2022-12-26 RX ADMIN — ATORVASTATIN CALCIUM 80 MG: 40 TABLET, FILM COATED ORAL at 09:12

## 2022-12-26 RX ADMIN — PREDNISOLONE ACETATE-GATIFLOXACIN-BROMFENAC 1 DROP: .75; 5; 1 SUSPENSION/ DROPS OPHTHALMIC at 09:12

## 2022-12-26 RX ADMIN — Medication 324 MG: at 09:12

## 2022-12-26 RX ADMIN — ISOSORBIDE DINITRATE 20 MG: 20 TABLET ORAL at 09:12

## 2022-12-26 RX ADMIN — OMEGA-3-ACID ETHYL ESTERS 2 G: 1 CAPSULE, LIQUID FILLED ORAL at 09:12

## 2022-12-26 RX ADMIN — ALLOPURINOL 100 MG: 100 TABLET ORAL at 09:12

## 2022-12-26 RX ADMIN — ISOSORBIDE DINITRATE 20 MG: 20 TABLET ORAL at 04:12

## 2022-12-26 RX ADMIN — FAMOTIDINE 20 MG: 20 TABLET ORAL at 09:12

## 2022-12-26 RX ADMIN — PREDNISOLONE ACETATE-GATIFLOXACIN-BROMFENAC 1 DROP: .75; 5; 1 SUSPENSION/ DROPS OPHTHALMIC at 03:12

## 2022-12-26 NOTE — SUBJECTIVE & OBJECTIVE
Interval History: NAEON. No further bleeding since packing.    Medications:  Continuous Infusions:  Scheduled Meds:   allopurinoL  100 mg Oral Daily    atorvastatin  80 mg Oral QHS    famotidine  20 mg Oral Daily    ferrous gluconate  324 mg Oral Daily with breakfast    isosorbide dinitrate  20 mg Oral TID    omega-3 acid ethyl esters  2 g Oral BID    prednisol ace-gatiflox-bromfen  1 drop Ophthalmic TID    sodium chloride 0.9%  500 mL Intravenous Once     PRN Meds:sodium chloride, sodium chloride, acetaminophen, albuterol-ipratropium, ALPRAZolam, aluminum-magnesium hydroxide-simethicone, dextrose 10%, dextrose 10%, glucagon (human recombinant), glucose, glucose, hydrALAZINE, loperamide, naloxone, ondansetron, prochlorperazine     Review of patient's allergies indicates:   Allergen Reactions    Fosinopril      Intolerance- elevates potassium level      Losartan      Intolerance- elevates potassium level     Objective:     Vital Signs (24h Range):  Temp:  [97.8 °F (36.6 °C)-98.6 °F (37 °C)] 97.8 °F (36.6 °C)  Pulse:  [57-81] 68  Resp:  [17-18] 17  SpO2:  [91 %-97 %] 97 %  BP: (128-186)/(61-72) 166/72       Lines/Drains/Airways       Peripheral Intravenous Line  Duration                  Peripheral IV - Single Lumen 12/21/22 1040 18 G Left Antecubital 4 days                    Physical Exam  NAD  L nasal cavity: nasopore with surgicel  R nasal cavity: fibrillar, surgicel  Oropharynx dry  Hemostatic   Normal WOB    Significant Labs:  BMP:   Recent Labs   Lab 12/25/22  0213   GLU 76   *   CO2 19*   BUN 70*   CREATININE 2.0*   CALCIUM 9.0   MG 1.8     CBC:   Recent Labs   Lab 12/25/22  1720   WBC 5.34   RBC 2.34*   HGB 7.1*   HCT 23.0*   *   MCV 98   MCH 30.3   MCHC 30.9*       Significant Diagnostics:  None

## 2022-12-26 NOTE — PROGRESS NOTES
[facility]  Otorhinolaryngology-Head & Neck Surgery  Progress Note    Subjective:     Post-Op Info:  * No surgery found *      Hospital Day: 6     Interval History: NAEON. No further bleeding since packing.    Medications:  Continuous Infusions:  Scheduled Meds:   allopurinoL  100 mg Oral Daily    atorvastatin  80 mg Oral QHS    famotidine  20 mg Oral Daily    ferrous gluconate  324 mg Oral Daily with breakfast    isosorbide dinitrate  20 mg Oral TID    omega-3 acid ethyl esters  2 g Oral BID    prednisol ace-gatiflox-bromfen  1 drop Ophthalmic TID    sodium chloride 0.9%  500 mL Intravenous Once     PRN Meds:sodium chloride, sodium chloride, acetaminophen, albuterol-ipratropium, ALPRAZolam, aluminum-magnesium hydroxide-simethicone, dextrose 10%, dextrose 10%, glucagon (human recombinant), glucose, glucose, hydrALAZINE, loperamide, naloxone, ondansetron, prochlorperazine     Review of patient's allergies indicates:   Allergen Reactions    Fosinopril      Intolerance- elevates potassium level      Losartan      Intolerance- elevates potassium level     Objective:     Vital Signs (24h Range):  Temp:  [97.8 °F (36.6 °C)-98.6 °F (37 °C)] 97.8 °F (36.6 °C)  Pulse:  [57-81] 68  Resp:  [17-18] 17  SpO2:  [91 %-97 %] 97 %  BP: (128-186)/(61-72) 166/72       Lines/Drains/Airways       Peripheral Intravenous Line  Duration                  Peripheral IV - Single Lumen 12/21/22 1040 18 G Left Antecubital 4 days                    Physical Exam  NAD  L nasal cavity: nasopore with surgicel  R nasal cavity: fibrillar, surgicel  Oropharynx dry  Hemostatic   Normal WOB    Significant Labs:  BMP:   Recent Labs   Lab 12/25/22  0213   GLU 76   *   CO2 19*   BUN 70*   CREATININE 2.0*   CALCIUM 9.0   MG 1.8     CBC:   Recent Labs   Lab 12/25/22  1720   WBC 5.34   RBC 2.34*   HGB 7.1*   HCT 23.0*   *   MCV 98   MCH 30.3   MCHC 30.9*       Significant Diagnostics:  None    Assessment/Plan:     *  Epistaxis  The patient is an 82 year old male with recurrent epistaxis s/p multiple cauterizations in the office, now with recurrent bleeding. ENT consulted upon admit 12/21. Now 12/25 ENT reconsulted this admission for recurrent bleeding. Packed bilateral nasal cavities once active oozing identified bilaterally. Of note patient with thrombocytopenia and hypertension  Hemostatic since packing.      - Bilateral absorbable packing will auto-dissolve  - Nasal saline q4h while packing in place; recommend continued Afrin TID x3 days with mustache dressing placed to absorb any minor oozing from nose around packs; ok if patient has some oozing around packs  - Afrin to bilateral nares if epistaxis recurs, see detailed instructions below  - Keep head of bed elevated  - Recommend tight control of BP   - Consider platelet replacement if appropriate given recurrent bleeds  - anticoagulation and rest of care per primary team  - Please call ENT with questions or concerns        PATIENT INSTRUCTIONS:    Nasal precautions  - DO NOT blow your nose for 2 weeks. The only exception is that you may lightly blow your nose after using a sinus rinse.  - Sneeze/cough with mouth open  - Avoid irritating substances that might make you sneeze, such as dust, chalk, harsh chemicals, and allergic triggers. This might also include spicy foods.  - Do not smoke   - Avoid flying or swimming for 2 weeks.    - Avoid all heavy lifting, straining or bending for 2 weeks.   - Avoid semi-contact sports or vigorous exercising for 3-4 weeks.      Aftercare/ moisturizing recommendations to decrease frequency of nosebleeds:  - Daily nasal saline sprays/rinses  - Nightly application of vaseline/aquaphor to anterior nares  - Room humidification  - Avoidance of nasal cannula if possible (always with humidification and please use face tent, nasal cup, facemask if possible)  Management of medical conditions contributing to epistaxis (coagulopathy, hypertension,  etc.)  - Humidification of CPAP appliance if applicable    If rebleeding occurs:   - Apply Afrin liberally to both nostrils  - Apply pressure over the soft part of the nose for 15 minutes (clip or digital pressure, important to have pressure over soft part of nose and consistent pressure (do not release pressure at any point))  - Instruct patient to lean forward, avoid swallowing blood. This can cause nausea and vomiting  - Can repeat these steps above up to 3 times  - Call/reconsult ENT or return to the nearest emergency department if this is unsuccessful              Joshua Washburn MD  Otorhinolaryngology-Head & Neck Surgery  [facility]

## 2022-12-26 NOTE — ASSESSMENT & PLAN NOTE
The patient is an 82 year old male with recurrent epistaxis s/p multiple cauterizations in the office, now with recurrent bleeding. ENT consulted upon admit 12/21. Now 12/25 ENT reconsulted this admission for recurrent bleeding. Packed bilateral nasal cavities once active oozing identified bilaterally. Of note patient with thrombocytopenia and hypertension  Hemostatic since packing.      - Bilateral absorbable packing will auto-dissolve  - Nasal saline q4h while packing in place; recommend continued Afrin TID x3 days with mustache dressing placed to absorb any minor oozing from nose around packs; ok if patient has some oozing around packs  - Afrin to bilateral nares if epistaxis recurs, see detailed instructions below  - Keep head of bed elevated  - Recommend tight control of BP   - Consider platelet replacement if appropriate given recurrent bleeds  - anticoagulation and rest of care per primary team  - Please call ENT with questions or concerns        PATIENT INSTRUCTIONS:    Nasal precautions  - DO NOT blow your nose for 2 weeks. The only exception is that you may lightly blow your nose after using a sinus rinse.  - Sneeze/cough with mouth open  - Avoid irritating substances that might make you sneeze, such as dust, chalk, harsh chemicals, and allergic triggers. This might also include spicy foods.  - Do not smoke   - Avoid flying or swimming for 2 weeks.    - Avoid all heavy lifting, straining or bending for 2 weeks.   - Avoid semi-contact sports or vigorous exercising for 3-4 weeks.      Aftercare/ moisturizing recommendations to decrease frequency of nosebleeds:  - Daily nasal saline sprays/rinses  - Nightly application of vaseline/aquaphor to anterior nares  - Room humidification  - Avoidance of nasal cannula if possible (always with humidification and please use face tent, nasal cup, facemask if possible)  Management of medical conditions contributing to epistaxis (coagulopathy, hypertension, etc.)  -  Humidification of CPAP appliance if applicable    If rebleeding occurs:   - Apply Afrin liberally to both nostrils  - Apply pressure over the soft part of the nose for 15 minutes (clip or digital pressure, important to have pressure over soft part of nose and consistent pressure (do not release pressure at any point))  - Instruct patient to lean forward, avoid swallowing blood. This can cause nausea and vomiting  - Can repeat these steps above up to 3 times  - Call/reconsult ENT or return to the nearest emergency department if this is unsuccessful

## 2022-12-26 NOTE — PROGRESS NOTES
[facility] [address] [place] [address] Medicine  Progress Note    Patient Name: Dariel Urbina  MRN: [id number]  Patient Class: IP- Inpatient   Admission Date: 12/21/2022  Length of Stay: 4 days  Attending Physician: Lamberto Blanton MD  Primary Care Provider: Devon Langston MD        Subjective:     Principal Problem:Epistaxis    Interval History: 82 y.o. male with past medical history of Melanoma CAD s/p CABG, PCI (11/2021), mechanical aortic valve (2014) on coumadin, HFmEF and diastolic dysfunction, CVA, CKD III, HTN, who presented to the ED with chest pain and epistaxis. Patient reports long-standing history of episodes of epistaxis, more so in colder climate and has had several cauterizations with ENT. He was found to have supratherapeutic INR and his warfarin was held. He experienced chest pain and troponin elevation during hospital stay, one time reading of hb 6.9. Cardiology was consulted and patient received blood products, with aim of hb 8. ENT and Interventional Radiology was consulted, and recommendations appreciated. Patient was started on Imdur, beta blocker was held due to bradycardia. Chest pain resolved with blood transfusion. Cardiology recommended Outpatient follow up.      Upon reviewing patient today. Patient still has some nose bleed, however no significant change in hb. Seen by ENT this am. Denies any chest pain or symptoms of anemia. INR 1.5 Plt 128. Low normal pulse noted    Review of Systems   HENT:  Positive for nosebleeds.    Respiratory:  Negative for shortness of breath.    Cardiovascular:  Negative for chest pain.   Objective:     Vital Signs (Most Recent):  Temp: 97.8 °F (36.6 °C) (12/26/22 0437)  Pulse: 68 (12/26/22 0437)  Resp: 17 (12/26/22 0437)  BP: (!) 166/72 (12/26/22 0437)  SpO2: 97 % (12/26/22 0437)   Vital Signs (24h Range):  Temp:  [97.8 °F (36.6 °C)-98.6 °F (37 °C)] 97.8 °F (36.6 °C)  Pulse:  [57-81] 68  Resp:  [17-18] 17  SpO2:  [91 %-97 %] 97 %  BP: (128-186)/(61-72)  166/72     Weight: 78 kg (171 lb 15.3 oz)  Body mass index is 28.62 kg/m².  No intake or output data in the 24 hours ending 12/26/22 0733   Physical Exam  Constitutional:       General: He is not in acute distress.     Appearance: He is obese.   HENT:      Head: Normocephalic.      Nose:      Comments: Nasal packing with blood noted   Eyes:      General: No scleral icterus.  Cardiovascular:      Rate and Rhythm: Normal rate.   Pulmonary:      Effort: Pulmonary effort is normal.   Skin:     General: Skin is warm.   Neurological:      General: No focal deficit present.      Mental Status: He is alert and oriented to person, place, and time.   Psychiatric:         Mood and Affect: Mood normal.         Significant Labs: All pertinent labs within the past 24 hours have been reviewed.        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] 09/26/2012 Yes    History of gout [Z87.39] 09/26/2012 Yes    Hyperlipidemia associated with type 2 diabetes mellitus [E11.69, E78.5] 09/26/2012 Yes      Problems Resolved During this Admission:    Diagnosis Date Noted Date Resolved POA    Acute blood loss anemia [D62] 12/21/2022 12/25/2022 Yes    Supratherapeutic INR [R79.1] 12/21/2022 12/25/2022 Yes    Chest pain [R07.9] 12/21/2022 12/25/2022 Yes    JIM (acute kidney injury) [N17.9] 12/21/2022 12/25/2022 Yes    Acute on chronic heart failure [I50.9] 10/23/2022 12/25/2022 Yes    Coronary artery disease of bypass graft of native heart  with stable angina pectoris [I25.708] 09/11/2012 12/25/2022 Yes     VTE Risk Mitigation (From admission, onward)           Ordered     Place sequential compression device  Until discontinued         12/24/22 0930     IP VTE HIGH RISK PATIENT  Once         12/21/22 1717     Place sequential compression device  Until discontinued         12/21/22 1717                   Recurrent Epistaxis.  Seen by ENT and IR. Patient still has bleeding, however no drop in hb.  He is off anticoagulation and antiplatelets. Continue to hold warfarin and antiplatelets at this time(last plavix received at 8:14 am on 12/23/22). Seen again by ENT, f/u recommendations.  Symptomatic anemia(s/p transfusion of PRBC), notably chest pain resolved. Probable demand ischemia NSTEMI. Underlying history of significant Coronary artery disease. Seen by Cardiology, outpatient follow up. Will have to hold antiplatlets and beta blocker at this time. Agree with their input, warfarin would take priority when reintroducing anticoagulation/antiplatelets. There is a critical blood shortage, and hb stable>7  Asymptomatic bradycardia. Beta blocker held, continue to monitor on tele  Mechanical Aortic Valve   Mild thrombocytopenia, chronic  Melanoma. Outpatient follow up  Diastolic HF and mildy reduced EF. Stable, because of ongoing bleed, will hold diuretics for now.     DVT prophylaxis: SCD  Fall precautions    Lamberto Blanton MD  Department of Hospital Medicine   [facility]

## 2022-12-26 NOTE — ASSESSMENT & PLAN NOTE
Recurrent epistaxis, unable to tolerate dual antiplatelet therapy. Discussed risks of bleeding vs. Benefits of AC with known hx of mechanical valve.     - can continue holding Warfarin today due to ongoing nosebleed this AM  - would rec resuming Warfarin at 5 mg daily tomorrow despite bleeding risk with goal INR closer to 2 due to risk of valve thrombosis   - management of epistaxis by ENT and primary team

## 2022-12-26 NOTE — PROGRESS NOTES
[facility]  Cardiology  Progress Note    Patient Name: Dariel Urbina  MRN: [id number]  Admission Date: 12/21/2022  Hospital Length of Stay: 4 days  Code Status: Full Code   Attending Physician: Lamberto Blanton MD   Primary Care Physician: Devon Langston MD  Expected Discharge Date: 12/23/2022  Principal Problem:Epistaxis    Subjective:     Hospital Course:   No notes on file    Interval History: Pt continued to have nosebleed this AM after nasal packing yesterday. Currently off of any antiplatelets or Coumadin. Discussed risks vs. Benefits of AC with known mechanical aortic valve. Pt otherwise denies any lightheadedness or chest pain.     Review of Systems   Constitutional: Negative for fever and malaise/fatigue.   HENT:  Positive for nosebleeds.    Eyes:  Negative for blurred vision and pain.   Cardiovascular:  Negative for chest pain, dyspnea on exertion, leg swelling, orthopnea, palpitations and paroxysmal nocturnal dyspnea.   Respiratory:  Negative for cough, shortness of breath, sputum production and wheezing.    Hematologic/Lymphatic: Negative for adenopathy and bleeding problem.   Skin:  Negative for rash.   Musculoskeletal:  Negative for back pain and neck pain.   Gastrointestinal:  Negative for abdominal pain, constipation, diarrhea, nausea and vomiting.   Genitourinary:  Negative for dysuria.   Neurological:  Negative for dizziness, headaches, light-headedness and weakness.   Objective:     Vital Signs (Most Recent):  Temp: 97.8 °F (36.6 °C) (12/26/22 1147)  Pulse: 69 (12/26/22 1147)  Resp: 18 (12/26/22 1147)  BP: (!) 153/83 (12/26/22 1147)  SpO2: 97 % (12/26/22 1147)   Vital Signs (24h Range):  Temp:  [97.8 °F (36.6 °C)-98.6 °F (37 °C)] 97.8 °F (36.6 °C)  Pulse:  [60-81] 69  Resp:  [17-18] 18  SpO2:  [91 %-97 %] 97 %  BP: (128-186)/(61-83) 153/83     Weight: 78 kg (171 lb 15.3 oz)  Body mass index is 28.62 kg/m².     SpO2: 97 %       No intake or output data in the 24 hours ending 12/26/22  1345      Lines/Drains/Airways       Peripheral Intravenous Line  Duration                  Peripheral IV - Single Lumen 12/21/22 1040 18 G Left Antecubital 5 days                    Physical Exam  Constitutional:       General: He is not in acute distress.     Appearance: Normal appearance. He is not ill-appearing, toxic-appearing or diaphoretic.   HENT:      Head: Normocephalic and atraumatic.      Nose:      Comments: Nasal packing present. Some dried blood present  Eyes:      Extraocular Movements: Extraocular movements intact.      Pupils: Pupils are equal, round, and reactive to light.   Cardiovascular:      Rate and Rhythm: Normal rate and regular rhythm.      Heart sounds: No murmur heard.    No friction rub. No gallop.   Pulmonary:      Effort: Pulmonary effort is normal. No respiratory distress.      Breath sounds: Normal breath sounds. No wheezing or rales.   Abdominal:      General: Abdomen is flat. There is no distension.      Palpations: Abdomen is soft. There is no mass.      Tenderness: There is no abdominal tenderness.   Musculoskeletal:         General: No swelling. Normal range of motion.      Cervical back: Normal range of motion. No rigidity.      Right lower leg: No edema.      Left lower leg: No edema.   Skin:     General: Skin is warm and dry.      Coloration: Skin is not jaundiced.      Findings: No bruising.   Neurological:      General: No focal deficit present.      Mental Status: He is alert and oriented to person, place, and time.      Cranial Nerves: No cranial nerve deficit.      Motor: No weakness.       Significant Labs: All pertinent lab results from the last 24 hours have been reviewed.    Significant Imaging: All significant imagine results from the last 24 hours have been reviewed.       Assessment and Plan:       NSTEMI (non-ST elevated myocardial infarction)  Pt with chest squeezing with exertion. Trop elevated to 2. Concern for ACS at this time although picture complicated by  recurrent epistaxis     Recommendations:  Can hold ASA due to continued bleeding  Cont Metoprolol Tartrate 25 mg TID  Cont Atorvastatin 80 mg  Transfuse 1 unit pRBC for Hgb goal >8 if blood available  Start Entresto 24/26 BID   Cont Isosorbide dinitrate 20 mg TID   Can start Ranolazine 500 mg daily (would monitor Cr with BMP in 1 week outpatient)  Outpatient PET Stress Test  F/U with Cardiology outpatient      H/O mechanical aortic valve replacement  Recurrent epistaxis, unable to tolerate dual antiplatelet therapy. Discussed risks of bleeding vs. Benefits of AC with known hx of mechanical valve.     - can continue holding Warfarin today due to ongoing nosebleed this AM  - would rec resuming Warfarin at 5 mg daily tomorrow despite bleeding risk with goal INR closer to 2 due to risk of valve thrombosis   - management of epistaxis by ENT and primary team         VTE Risk Mitigation (From admission, onward)         Ordered     Place sequential compression device  Until discontinued         12/24/22 0930     IP VTE HIGH RISK PATIENT  Once         12/21/22 1717     Place sequential compression device  Until discontinued         12/21/22 1717                Leticia Ren MD  Cardiology  [facility]

## 2022-12-26 NOTE — SUBJECTIVE & OBJECTIVE
Interval History: Pt continued to have nosebleed this AM after nasal packing yesterday. Currently off of any antiplatelets or Coumadin. Discussed risks vs. Benefits of AC with known mechanical aortic valve. Pt otherwise denies any lightheadedness or chest pain.     Review of Systems   Constitutional: Negative for fever and malaise/fatigue.   HENT:  Positive for nosebleeds.    Eyes:  Negative for blurred vision and pain.   Cardiovascular:  Negative for chest pain, dyspnea on exertion, leg swelling, orthopnea, palpitations and paroxysmal nocturnal dyspnea.   Respiratory:  Negative for cough, shortness of breath, sputum production and wheezing.    Hematologic/Lymphatic: Negative for adenopathy and bleeding problem.   Skin:  Negative for rash.   Musculoskeletal:  Negative for back pain and neck pain.   Gastrointestinal:  Negative for abdominal pain, constipation, diarrhea, nausea and vomiting.   Genitourinary:  Negative for dysuria.   Neurological:  Negative for dizziness, headaches, light-headedness and weakness.   Objective:     Vital Signs (Most Recent):  Temp: 97.8 °F (36.6 °C) (12/26/22 1147)  Pulse: 69 (12/26/22 1147)  Resp: 18 (12/26/22 1147)  BP: (!) 153/83 (12/26/22 1147)  SpO2: 97 % (12/26/22 1147)   Vital Signs (24h Range):  Temp:  [97.8 °F (36.6 °C)-98.6 °F (37 °C)] 97.8 °F (36.6 °C)  Pulse:  [60-81] 69  Resp:  [17-18] 18  SpO2:  [91 %-97 %] 97 %  BP: (128-186)/(61-83) 153/83     Weight: 78 kg (171 lb 15.3 oz)  Body mass index is 28.62 kg/m².     SpO2: 97 %       No intake or output data in the 24 hours ending 12/26/22 1345      Lines/Drains/Airways       Peripheral Intravenous Line  Duration                  Peripheral IV - Single Lumen 12/21/22 1040 18 G Left Antecubital 5 days                    Physical Exam  Constitutional:       General: He is not in acute distress.     Appearance: Normal appearance. He is not ill-appearing, toxic-appearing or diaphoretic.   HENT:      Head: Normocephalic and  atraumatic.      Nose:      Comments: Nasal packing present. Some dried blood present  Eyes:      Extraocular Movements: Extraocular movements intact.      Pupils: Pupils are equal, round, and reactive to light.   Cardiovascular:      Rate and Rhythm: Normal rate and regular rhythm.      Heart sounds: No murmur heard.    No friction rub. No gallop.   Pulmonary:      Effort: Pulmonary effort is normal. No respiratory distress.      Breath sounds: Normal breath sounds. No wheezing or rales.   Abdominal:      General: Abdomen is flat. There is no distension.      Palpations: Abdomen is soft. There is no mass.      Tenderness: There is no abdominal tenderness.   Musculoskeletal:         General: No swelling. Normal range of motion.      Cervical back: Normal range of motion. No rigidity.      Right lower leg: No edema.      Left lower leg: No edema.   Skin:     General: Skin is warm and dry.      Coloration: Skin is not jaundiced.      Findings: No bruising.   Neurological:      General: No focal deficit present.      Mental Status: He is alert and oriented to person, place, and time.      Cranial Nerves: No cranial nerve deficit.      Motor: No weakness.       Significant Labs: All pertinent lab results from the last 24 hours have been reviewed.    Significant Imaging: All significant imagine results from the last 24 hours have been reviewed.

## 2022-12-27 LAB
BASOPHILS # BLD AUTO: 0.02 K/UL (ref 0–0.2)
BASOPHILS NFR BLD: 0.3 % (ref 0–1.9)
DIFFERENTIAL METHOD: ABNORMAL
EOSINOPHIL # BLD AUTO: 0.2 K/UL (ref 0–0.5)
EOSINOPHIL NFR BLD: 3.5 % (ref 0–8)
ERYTHROCYTE [DISTWIDTH] IN BLOOD BY AUTOMATED COUNT: 15.8 % (ref 11.5–14.5)
HCT VFR BLD AUTO: 22.6 % (ref 40–54)
HGB BLD-MCNC: 7 G/DL (ref 14–18)
IMM GRANULOCYTES # BLD AUTO: 0.02 K/UL (ref 0–0.04)
IMM GRANULOCYTES NFR BLD AUTO: 0.3 % (ref 0–0.5)
INR PPP: 1.3 (ref 0.8–1.2)
LYMPHOCYTES # BLD AUTO: 1.3 K/UL (ref 1–4.8)
LYMPHOCYTES NFR BLD: 21.4 % (ref 18–48)
MCH RBC QN AUTO: 31.5 PG (ref 27–31)
MCHC RBC AUTO-ENTMCNC: 31 G/DL (ref 32–36)
MCV RBC AUTO: 102 FL (ref 82–98)
MONOCYTES # BLD AUTO: 0.9 K/UL (ref 0.3–1)
MONOCYTES NFR BLD: 14.2 % (ref 4–15)
NEUTROPHILS # BLD AUTO: 3.6 K/UL (ref 1.8–7.7)
NEUTROPHILS NFR BLD: 60.3 % (ref 38–73)
NRBC BLD-RTO: 0 /100 WBC
PLATELET # BLD AUTO: 118 K/UL (ref 150–450)
PMV BLD AUTO: 10.3 FL (ref 9.2–12.9)
PROTHROMBIN TIME: 13.7 SEC (ref 9–12.5)
RBC # BLD AUTO: 2.22 M/UL (ref 4.6–6.2)
WBC # BLD AUTO: 6.04 K/UL (ref 3.9–12.7)

## 2022-12-27 PROCEDURE — 99225 PR SUBSEQUENT OBSERVATION CARE,LEVEL II: ICD-10-PCS | Mod: HCNC,,, | Performed by: INTERNAL MEDICINE

## 2022-12-27 PROCEDURE — 99225 PR SUBSEQUENT OBSERVATION CARE,LEVEL II: CPT | Mod: HCNC,,, | Performed by: INTERNAL MEDICINE

## 2022-12-27 PROCEDURE — 85025 COMPLETE CBC W/AUTO DIFF WBC: CPT | Mod: HCNC | Performed by: INTERNAL MEDICINE

## 2022-12-27 PROCEDURE — 25000003 PHARM REV CODE 250: Mod: HCNC

## 2022-12-27 PROCEDURE — 11000001 HC ACUTE MED/SURG PRIVATE ROOM: Mod: HCNC

## 2022-12-27 PROCEDURE — 25000003 PHARM REV CODE 250: Mod: HCNC | Performed by: INTERNAL MEDICINE

## 2022-12-27 PROCEDURE — 99232 SBSQ HOSP IP/OBS MODERATE 35: CPT | Mod: HCNC,,, | Performed by: INTERNAL MEDICINE

## 2022-12-27 PROCEDURE — 85610 PROTHROMBIN TIME: CPT | Mod: HCNC | Performed by: HOSPITALIST

## 2022-12-27 PROCEDURE — 25000003 PHARM REV CODE 250: Mod: HCNC | Performed by: HOSPITALIST

## 2022-12-27 PROCEDURE — 99232 PR SUBSEQUENT HOSPITAL CARE,LEVL II: ICD-10-PCS | Mod: HCNC,,, | Performed by: INTERNAL MEDICINE

## 2022-12-27 PROCEDURE — 94761 N-INVAS EAR/PLS OXIMETRY MLT: CPT | Mod: HCNC

## 2022-12-27 PROCEDURE — 36415 COLL VENOUS BLD VENIPUNCTURE: CPT | Mod: HCNC | Performed by: HOSPITALIST

## 2022-12-27 RX ORDER — WARFARIN 2.5 MG/1
5 TABLET ORAL DAILY
Status: DISCONTINUED | OUTPATIENT
Start: 2022-12-27 | End: 2022-12-28 | Stop reason: HOSPADM

## 2022-12-27 RX ORDER — ISOSORBIDE MONONITRATE 60 MG/1
60 TABLET, EXTENDED RELEASE ORAL DAILY
Status: DISCONTINUED | OUTPATIENT
Start: 2022-12-28 | End: 2022-12-28 | Stop reason: HOSPADM

## 2022-12-27 RX ADMIN — OMEGA-3-ACID ETHYL ESTERS 2 G: 1 CAPSULE, LIQUID FILLED ORAL at 09:12

## 2022-12-27 RX ADMIN — Medication 324 MG: at 09:12

## 2022-12-27 RX ADMIN — LOPERAMIDE HYDROCHLORIDE 2 MG: 2 CAPSULE ORAL at 04:12

## 2022-12-27 RX ADMIN — OMEGA-3-ACID ETHYL ESTERS 2 G: 1 CAPSULE, LIQUID FILLED ORAL at 08:12

## 2022-12-27 RX ADMIN — PREDNISOLONE ACETATE-GATIFLOXACIN-BROMFENAC 1 DROP: .75; 5; 1 SUSPENSION/ DROPS OPHTHALMIC at 03:12

## 2022-12-27 RX ADMIN — ISOSORBIDE DINITRATE 20 MG: 20 TABLET ORAL at 09:12

## 2022-12-27 RX ADMIN — ALLOPURINOL 100 MG: 100 TABLET ORAL at 09:12

## 2022-12-27 RX ADMIN — WARFARIN SODIUM 5 MG: 2.5 TABLET ORAL at 04:12

## 2022-12-27 RX ADMIN — FAMOTIDINE 20 MG: 20 TABLET ORAL at 09:12

## 2022-12-27 RX ADMIN — METOPROLOL SUCCINATE 12.5 MG: 25 TABLET, EXTENDED RELEASE ORAL at 12:12

## 2022-12-27 RX ADMIN — ATORVASTATIN CALCIUM 80 MG: 40 TABLET, FILM COATED ORAL at 08:12

## 2022-12-27 RX ADMIN — PREDNISOLONE ACETATE-GATIFLOXACIN-BROMFENAC 1 DROP: .75; 5; 1 SUSPENSION/ DROPS OPHTHALMIC at 09:12

## 2022-12-27 NOTE — ASSESSMENT & PLAN NOTE
Recurrent epistaxis, unable to tolerate dual antiplatelet therapy. Discussed risks of bleeding vs. Benefits of AC with known hx of mechanical valve.     - resume Warfarin at 5 mg daily despite bleeding risk with goal INR closer to 2 due to risk of valve thrombosis   - management of epistaxis by ENT and primary team

## 2022-12-27 NOTE — SUBJECTIVE & OBJECTIVE
Interval History: Nasal packing from yesterday night in place. No new or worsening nosebleed that patient is aware about. Still spitting up old blood per patient. No recurrent chest pain.     Review of Systems   Constitutional: Negative for fever and malaise/fatigue.   HENT:  Positive for nosebleeds.    Eyes:  Negative for blurred vision and pain.   Cardiovascular:  Negative for chest pain, dyspnea on exertion, leg swelling, orthopnea, palpitations and paroxysmal nocturnal dyspnea.   Respiratory:  Negative for cough, shortness of breath, sputum production and wheezing.    Hematologic/Lymphatic: Negative for adenopathy and bleeding problem.   Skin:  Negative for rash.   Musculoskeletal:  Negative for back pain and neck pain.   Gastrointestinal:  Negative for abdominal pain, constipation, diarrhea, nausea and vomiting.   Genitourinary:  Negative for dysuria.   Neurological:  Negative for dizziness, headaches, light-headedness and weakness.   Objective:     Vital Signs (Most Recent):  Temp: 97.5 °F (36.4 °C) (12/27/22 1153)  Pulse: 67 (12/27/22 1153)  Resp: 17 (12/27/22 1153)  BP: (!) 144/63 (12/27/22 1153)  SpO2: 97 % (12/27/22 1153)   Vital Signs (24h Range):  Temp:  [97.3 °F (36.3 °C)-99.1 °F (37.3 °C)] 97.5 °F (36.4 °C)  Pulse:  [67-81] 67  Resp:  [17-19] 17  SpO2:  [94 %-98 %] 97 %  BP: (120-155)/(56-65) 144/63     Weight: 78 kg (171 lb 15.3 oz)  Body mass index is 28.62 kg/m².     SpO2: 97 %       No intake or output data in the 24 hours ending 12/27/22 1339      Lines/Drains/Airways       Peripheral Intravenous Line  Duration                  Peripheral IV - Single Lumen 12/21/22 1040 18 G Left Antecubital 6 days                    Physical Exam  Constitutional:       General: He is not in acute distress.     Appearance: Normal appearance. He is not ill-appearing, toxic-appearing or diaphoretic.   HENT:      Head: Normocephalic and atraumatic.      Nose:      Comments: Nasal packing present. Some dried blood  present  Eyes:      Extraocular Movements: Extraocular movements intact.      Pupils: Pupils are equal, round, and reactive to light.   Cardiovascular:      Rate and Rhythm: Normal rate and regular rhythm.      Heart sounds: No murmur heard.    No friction rub. No gallop.   Pulmonary:      Effort: Pulmonary effort is normal. No respiratory distress.      Breath sounds: Normal breath sounds. No wheezing or rales.   Abdominal:      General: Abdomen is flat. There is no distension.      Palpations: Abdomen is soft. There is no mass.      Tenderness: There is no abdominal tenderness.   Musculoskeletal:         General: No swelling. Normal range of motion.      Cervical back: Normal range of motion. No rigidity.      Right lower leg: No edema.      Left lower leg: No edema.   Skin:     General: Skin is warm and dry.      Coloration: Skin is not jaundiced.      Findings: No bruising.   Neurological:      General: No focal deficit present.      Mental Status: He is alert and oriented to person, place, and time.      Cranial Nerves: No cranial nerve deficit.      Motor: No weakness.       Significant Labs: All pertinent lab results from the last 24 hours have been reviewed.    Significant Imaging: All significant imagine results from the last 24 hours have been reviewed.

## 2022-12-27 NOTE — PROGRESS NOTES
[facility] [address] [place] [address] Medicine  Progress Note    Patient Name: Dariel Urbina  MRN: [id number]  Patient Class: IP- Inpatient   Admission Date: 12/21/2022  Length of Stay: 5 days  Attending Physician: Lamberto Blanton MD  Primary Care Provider: Devon Langston MD        Subjective:     Principal Problem:Epistaxis    Interval History: 82 y.o. male with past medical history of Melanoma CAD s/p CABG, PCI (11/2021), mechanical aortic valve (2014) on coumadin, HFmEF and diastolic dysfunction, CVA, CKD III, HTN, who presented to the ED with chest pain and epistaxis. Patient reports long-standing history of episodes of epistaxis, more so in colder climate and has had several cauterizations with ENT. He was found to have supratherapeutic INR and his warfarin was held. He experienced chest pain and troponin elevation during hospital stay, one time reading of hb 6.9. Cardiology was consulted and patient received blood products,. ENT and Interventional Radiology was consulted, and recommendations appreciated. Patient was started on Imdur, beta blocker was held due to bradycardia. Chest pain resolved with blood transfusion. Cardiology recommended Outpatient follow up. Patient continued to have recurrent bleeding and ENT was re consulted.    Packing still present from yesterday. No complaints today. Patient had discussion re risk and benefits with Cardiology and his warfarin therapy, this was relayed today to wife at bedside today, and collective decision was to resume warfarin at 5mg, understanding the risks and also noting that it will take about 2 days to start being effective. INR of 1.3 today. Heart rate improved    Review of Systems   All other systems reviewed and are negative.  Objective:     Vital Signs (Most Recent):  Temp: 98 °F (36.7 °C) (12/27/22 0747)  Pulse: 79 (12/27/22 0747)  Resp: 17 (12/27/22 0747)  BP: (!) 143/63 (12/27/22 0747)  SpO2: 98 % (12/27/22 0747)   Vital Signs (24h Range):  Temp:   [97.3 °F (36.3 °C)-99.1 °F (37.3 °C)] 98 °F (36.7 °C)  Pulse:  [66-81] 79  Resp:  [17-19] 17  SpO2:  [94 %-98 %] 98 %  BP: (120-155)/(56-83) 143/63     Weight: 78 kg (171 lb 15.3 oz)  Body mass index is 28.62 kg/m².  No intake or output data in the 24 hours ending 12/27/22 0843   Physical Exam  Constitutional:       General: He is not in acute distress.     Appearance: He is obese.   HENT:      Head: Normocephalic.      Right Ear: External ear normal.      Left Ear: External ear normal.      Nose:      Comments: Nasal Packing  Eyes:      General: No scleral icterus.  Cardiovascular:      Rate and Rhythm: Normal rate.   Pulmonary:      Effort: Pulmonary effort is normal.   Skin:     General: Skin is warm.   Neurological:      General: No focal deficit present.      Mental Status: He is alert and oriented to person, place, and time.   Psychiatric:         Mood and Affect: Mood normal.             Significant Labs: All pertinent labs within the past 24 hours have been reviewed.        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] 09/26/2012 Yes    History of gout [Z87.39] 09/26/2012 Yes    Hyperlipidemia associated with type 2 diabetes mellitus [E11.69, E78.5] 09/26/2012 Yes      Problems Resolved During this Admission:    Diagnosis Date Noted Date Resolved POA    Acute blood loss anemia [D62] 12/21/2022 12/25/2022 Yes    Supratherapeutic INR [R79.1] 12/21/2022 12/25/2022 Yes    Chest pain [R07.9] 12/21/2022  12/25/2022 Yes    JIM (acute kidney injury) [N17.9] 12/21/2022 12/25/2022 Yes    Acute on chronic heart failure [I50.9] 10/23/2022 12/25/2022 Yes    Coronary artery disease of bypass graft of native heart with stable angina pectoris [I25.708] 09/11/2012 12/25/2022 Yes     VTE Risk Mitigation (From admission, onward)           Ordered     Place sequential compression device  Until discontinued         12/24/22 0930     IP VTE HIGH RISK PATIENT  Once         12/21/22 1717     Place sequential compression device  Until discontinued         12/21/22 1717                   Recurrent Epistaxis.  Seen by ENT, f/u recommendations. Collective decision was made to resume warfarin at 5mg(a lower dose) today after discussing risks and benefits with patient and wife. Hold antiplatelets.  Symptomatic anemia(s/p transfusion of PRBC), notably chest pain resolved. Probable demand ischemia NSTEMI. Underlying history of significant Coronary artery disease. Seen by Cardiology, outpatient follow up. Will have to hold antiplatlets.  There is a critical blood shortage, and hb stable>7.  We will resume beta blocker with metoprolol succinate per cardiology's request however initiate a 12.5 mg daily.  We will also switch patient back to Imdur 60 mg daily  Bradycardia resolved.  Mechanical Aortic Valve. Warfarin resumed   Mild thrombocytopenia, chronic  Melanoma. Outpatient follow up  Diastolic HF and mildy reduced EF. Stable, because of ongoing bleed, will hold diuretics for now.     DVT prophylaxis: SCD  Fall precautions    Lamberto Blanton MD  Department of Hospital Medicine   [facility]

## 2022-12-27 NOTE — PROGRESS NOTES
PHARMACY CONSULT NOTE: WARFARIN  Dariel Urbina is a 82 y.o. male on warfarin therapy for Mechanical Heart Valve (aortic). PharmD has been consulted for warfarin dosing. Of note, patient admitted with Epistaxis.     Current order: warfarin 5 mg daily   Home dose: 5 mg every Mon, Fri; 7.5 mg all other days  Coumadin clinic enrollment: Active  INR goal: 2-3    Lab Results   Component Value Date    INR 1.3 (H) 12/27/2022    INR 1.5 (H) 12/26/2022    INR 1.7 (H) 12/25/2022    INR 1.7 (H) 12/25/2022     Diet: Adult Cardiac with Boost supplements     Recommendation(s):   Continue warfarin 5mg daily  INR daily (ordered)  Pharmacy will continue to follow and monitor warfarin    Thank you for the consult,  Roselia Marino, PharmD, BCPS  [id number]     **Note: Consults are reviewed Monday-Friday 7:00am-3:30pm. THE ABOVE RECOMMENDATIONS ARE ONLY SUGGESTED.THE RECOMMENDATIONS SHOULD BE CONSIDERED IN CONJUNCTION WITH ALL PATIENT FACTORS. **

## 2022-12-27 NOTE — NURSING
Report received from EMERY Rowe RN.  Patient awake, alert, and oriented x 4. Sitting on sofa at bedside. NAD noted. Tele monitor in place. Respirations are even and unlabored. Plan of care reviewed. Education provided. Instruction given on use of call light. Bed locked and in lowest position. All safety measures are in place. Communication board updated with direct nursing extension. RN will continue to monitor.

## 2022-12-27 NOTE — PLAN OF CARE
Plan of care reviewed with patient. Questions answered.  Problem: Adult Inpatient Plan of Care  Goal: Plan of Care Review  Outcome: Ongoing, Progressing  Goal: Patient-Specific Goal (Individualized)  Outcome: Ongoing, Progressing  Goal: Absence of Hospital-Acquired Illness or Injury  Outcome: Ongoing, Progressing  Goal: Optimal Comfort and Wellbeing  Outcome: Ongoing, Progressing  Goal: Readiness for Transition of Care  Outcome: Ongoing, Progressing     Problem: Infection  Goal: Absence of Infection Signs and Symptoms  Outcome: Ongoing, Progressing     Problem: Diabetes Comorbidity  Goal: Blood Glucose Level Within Targeted Range  Outcome: Ongoing, Progressing     Problem: Fluid and Electrolyte Imbalance (Acute Kidney Injury/Impairment)  Goal: Fluid and Electrolyte Balance  Outcome: Ongoing, Progressing     Problem: Oral Intake Inadequate (Acute Kidney Injury/Impairment)  Goal: Optimal Nutrition Intake  Outcome: Ongoing, Progressing     Problem: Renal Function Impairment (Acute Kidney Injury/Impairment)  Goal: Effective Renal Function  Outcome: Ongoing, Progressing     Problem: Fall Injury Risk  Goal: Absence of Fall and Fall-Related Injury  Outcome: Ongoing, Progressing

## 2022-12-27 NOTE — PROGRESS NOTES
[facility]  Cardiology  Progress Note    Patient Name: Dariel Urbina  MRN: [id number]  Admission Date: 12/21/2022  Hospital Length of Stay: 5 days  Code Status: Full Code   Attending Physician: Lamberto Blanton MD   Primary Care Physician: Devon Langston MD  Expected Discharge Date: 12/28/2022  Principal Problem:Epistaxis    Subjective:     Hospital Course:   No notes on file    Interval History: Nasal packing from yesterday night in place. No new or worsening nosebleed that patient is aware about. Still spitting up old blood per patient. No recurrent chest pain.     Review of Systems   Constitutional: Negative for fever and malaise/fatigue.   HENT:  Positive for nosebleeds.    Eyes:  Negative for blurred vision and pain.   Cardiovascular:  Negative for chest pain, dyspnea on exertion, leg swelling, orthopnea, palpitations and paroxysmal nocturnal dyspnea.   Respiratory:  Negative for cough, shortness of breath, sputum production and wheezing.    Hematologic/Lymphatic: Negative for adenopathy and bleeding problem.   Skin:  Negative for rash.   Musculoskeletal:  Negative for back pain and neck pain.   Gastrointestinal:  Negative for abdominal pain, constipation, diarrhea, nausea and vomiting.   Genitourinary:  Negative for dysuria.   Neurological:  Negative for dizziness, headaches, light-headedness and weakness.   Objective:     Vital Signs (Most Recent):  Temp: 97.5 °F (36.4 °C) (12/27/22 1153)  Pulse: 67 (12/27/22 1153)  Resp: 17 (12/27/22 1153)  BP: (!) 144/63 (12/27/22 1153)  SpO2: 97 % (12/27/22 1153)   Vital Signs (24h Range):  Temp:  [97.3 °F (36.3 °C)-99.1 °F (37.3 °C)] 97.5 °F (36.4 °C)  Pulse:  [67-81] 67  Resp:  [17-19] 17  SpO2:  [94 %-98 %] 97 %  BP: (120-155)/(56-65) 144/63     Weight: 78 kg (171 lb 15.3 oz)  Body mass index is 28.62 kg/m².     SpO2: 97 %       No intake or output data in the 24 hours ending 12/27/22 1339      Lines/Drains/Airways       Peripheral Intravenous Line   Duration                  Peripheral IV - Single Lumen 12/21/22 1040 18 G Left Antecubital 6 days                    Physical Exam  Constitutional:       General: He is not in acute distress.     Appearance: Normal appearance. He is not ill-appearing, toxic-appearing or diaphoretic.   HENT:      Head: Normocephalic and atraumatic.      Nose:      Comments: Nasal packing present. Some dried blood present  Eyes:      Extraocular Movements: Extraocular movements intact.      Pupils: Pupils are equal, round, and reactive to light.   Cardiovascular:      Rate and Rhythm: Normal rate and regular rhythm.      Heart sounds: No murmur heard.    No friction rub. No gallop.   Pulmonary:      Effort: Pulmonary effort is normal. No respiratory distress.      Breath sounds: Normal breath sounds. No wheezing or rales.   Abdominal:      General: Abdomen is flat. There is no distension.      Palpations: Abdomen is soft. There is no mass.      Tenderness: There is no abdominal tenderness.   Musculoskeletal:         General: No swelling. Normal range of motion.      Cervical back: Normal range of motion. No rigidity.      Right lower leg: No edema.      Left lower leg: No edema.   Skin:     General: Skin is warm and dry.      Coloration: Skin is not jaundiced.      Findings: No bruising.   Neurological:      General: No focal deficit present.      Mental Status: He is alert and oriented to person, place, and time.      Cranial Nerves: No cranial nerve deficit.      Motor: No weakness.       Significant Labs: All pertinent lab results from the last 24 hours have been reviewed.    Significant Imaging: All significant imagine results from the last 24 hours have been reviewed.       Assessment and Plan:     NSTEMI (non-ST elevated myocardial infarction)  Pt with chest squeezing with exertion. Trop elevated to 2. Concern for ACS at this time although picture complicated by epistaxis     Recommendations:  -Hold ASA in setting of AC with hx  of recurrent epistasix  -Transition pt to metoprolol succinate 25 mg daily with HR goal of 70-80s  -Cont Atorvastatin 80 mg  -Start Entresto 24/26 BID   -Treat anemia if he continues to have chest pain as likely demand ischemia, Hgb goal >8  -Outpatient PET Stress Test  -F/U with Cardiology outpatient      H/O mechanical aortic valve replacement  Recurrent epistaxis, unable to tolerate dual antiplatelet therapy. Discussed risks of bleeding vs. Benefits of AC with known hx of mechanical valve.     - resume Warfarin at 5 mg daily despite bleeding risk with goal INR closer to 2 due to risk of valve thrombosis   - management of epistaxis by ENT and primary team     We will sign off on the patient. If you have any questions or concerns, please do reach out to the them.    VTE Risk Mitigation (From admission, onward)           Ordered     warfarin (COUMADIN) tablet 5 mg  Daily         12/27/22 0853     Place sequential compression device  Until discontinued         12/24/22 0930     IP VTE HIGH RISK PATIENT  Once         12/21/22 1717     Place sequential compression device  Until discontinued         12/21/22 1717                    Leticia Ren MD  Cardiology  [facility]

## 2022-12-27 NOTE — ASSESSMENT & PLAN NOTE
Pt with chest squeezing with exertion. Trop elevated to 2. Concern for ACS at this time although picture complicated by epistaxis     Recommendations:  -Hold ASA in setting of AC with hx of recurrent epistasix  -Transition pt to metoprolol succinate 25 mg daily with HR goal of 70-80s  -Cont Atorvastatin 80 mg  -Start Entresto 24/26 BID   -Treat anemia if he continues to have chest pain as likely demand ischemia, Hgb goal >8  -Outpatient PET Stress Test  -F/U with Cardiology outpatient

## 2022-12-27 NOTE — NURSING
Nasal packaging still in place. Guaze placed outside of packing due to mild bleeding noted outside of packaging. No falls or injuries noted. Plan of care discussed with pt. Safety and fall precautions maintained

## 2022-12-28 ENCOUNTER — PATIENT MESSAGE (OUTPATIENT)
Dept: CARDIOLOGY | Facility: CLINIC | Age: 82
End: 2022-12-28
Payer: MEDICARE

## 2022-12-28 VITALS
RESPIRATION RATE: 18 BRPM | HEART RATE: 76 BPM | DIASTOLIC BLOOD PRESSURE: 70 MMHG | HEIGHT: 65 IN | BODY MASS INDEX: 28.65 KG/M2 | SYSTOLIC BLOOD PRESSURE: 161 MMHG | OXYGEN SATURATION: 92 % | TEMPERATURE: 98 F | WEIGHT: 171.94 LBS

## 2022-12-28 LAB
INR PPP: 1.2 (ref 0.8–1.2)
PROTHROMBIN TIME: 12.4 SEC (ref 9–12.5)

## 2022-12-28 PROCEDURE — 99217 PR OBSERVATION CARE DISCHARGE: CPT | Mod: HCNC,,, | Performed by: INTERNAL MEDICINE

## 2022-12-28 PROCEDURE — 85610 PROTHROMBIN TIME: CPT | Mod: HCNC | Performed by: HOSPITALIST

## 2022-12-28 PROCEDURE — 25000003 PHARM REV CODE 250: Mod: HCNC | Performed by: INTERNAL MEDICINE

## 2022-12-28 PROCEDURE — 25000003 PHARM REV CODE 250: Mod: HCNC | Performed by: HOSPITALIST

## 2022-12-28 PROCEDURE — 99217 PR OBSERVATION CARE DISCHARGE: ICD-10-PCS | Mod: HCNC,,, | Performed by: INTERNAL MEDICINE

## 2022-12-28 PROCEDURE — 1111F DSCHRG MED/CURRENT MED MERGE: CPT | Mod: HCNC,CPTII,, | Performed by: INTERNAL MEDICINE

## 2022-12-28 PROCEDURE — 36415 COLL VENOUS BLD VENIPUNCTURE: CPT | Mod: HCNC | Performed by: HOSPITALIST

## 2022-12-28 PROCEDURE — 25000003 PHARM REV CODE 250: Mod: HCNC

## 2022-12-28 PROCEDURE — 1111F PR DISCHARGE MEDS RECONCILED W/ CURRENT OUTPATIENT MED LIST: ICD-10-PCS | Mod: HCNC,CPTII,, | Performed by: INTERNAL MEDICINE

## 2022-12-28 RX ORDER — ISOSORBIDE MONONITRATE 60 MG/1
60 TABLET, EXTENDED RELEASE ORAL DAILY
Qty: 30 TABLET | Refills: 1 | Status: SHIPPED | OUTPATIENT
Start: 2022-12-28 | End: 2023-03-01

## 2022-12-28 RX ORDER — METOPROLOL SUCCINATE 25 MG/1
12.5 TABLET, EXTENDED RELEASE ORAL DAILY
Qty: 15 TABLET | Refills: 1 | Status: ON HOLD | OUTPATIENT
Start: 2022-12-28 | End: 2022-12-31 | Stop reason: HOSPADM

## 2022-12-28 RX ADMIN — ALLOPURINOL 100 MG: 100 TABLET ORAL at 08:12

## 2022-12-28 RX ADMIN — FAMOTIDINE 20 MG: 20 TABLET ORAL at 08:12

## 2022-12-28 RX ADMIN — ISOSORBIDE MONONITRATE 60 MG: 60 TABLET, EXTENDED RELEASE ORAL at 08:12

## 2022-12-28 RX ADMIN — METOPROLOL SUCCINATE 12.5 MG: 25 TABLET, EXTENDED RELEASE ORAL at 08:12

## 2022-12-28 RX ADMIN — OMEGA-3-ACID ETHYL ESTERS 2 G: 1 CAPSULE, LIQUID FILLED ORAL at 08:12

## 2022-12-28 RX ADMIN — PREDNISOLONE ACETATE-GATIFLOXACIN-BROMFENAC 1 DROP: .75; 5; 1 SUSPENSION/ DROPS OPHTHALMIC at 09:12

## 2022-12-28 RX ADMIN — Medication 324 MG: at 08:12

## 2022-12-28 NOTE — PROGRESS NOTES
[place] is being [place] [facility] [place]t scheduled for 12/29/22.

## 2022-12-28 NOTE — PROGRESS NOTES
Admitted 12/21-12/28 for management of recurrent epistaxis.    Per hospital notes:  Hospital Course:  82 y.o. male with past medical history of Melanoma, CAD s/p CABG, PCI (11/2021), mechanical aortic valve (2014) on coumadin, HFmEF and diastolic dysfunction, CVA, CKD III, HTN,  presented to the ED with chest pain and epistaxis. Patient reports long-standing history of recurrent epistaxis, more so in colder climate and has had several cauterizations with ENT. He was found to have supratherapeutic INR and his warfarin was held. He experienced chest pain and troponin elevation during hospital stay, at one point in time a reading of hb 6.9. Cardiology was consulted and patient was assessed as symptomatic anemia, type 2 MI/demand ischemia and received blood products with resolution of chest pain. ENT and Interventional Radiology was consulted.  No intervention by IR, initial nasal packing by ENT however patient subsequently had further epistaxis requiring repeat parking with dissolvable nasal packs.  Patient's beta-blocker was held at some points during his hospital course, his pulse rate improved and he was started on a lower dose of metoprolol succinate of which he tolerated, his Imdur dosing was reduced.  Long discussion was held with patient and wife in addition to Cardiology. Collective decision was made to hold antiplatelets for now.  Risks and benefits were discussed in details and patient was restarted on Coumadin however at a lower dose of 5 mg daily.  His INR on day of discharge was 1.2. Collective decision was made to continue warfarin and follow up with INR clinic tomorrow at which point in time to warfarin will likely start to have an effect, No bridging anticoagulation at this time as he just recovered from significant epistaxis.  On day of discharge patient had a period of greater than 24 hours without epistaxis and was asymptomatic.  He is to follow up with his primary care, Cardiology and ENT.  Patient  is to adhere to his follow-ups for melanoma.  His heart failure was stable during hospital stay.        Calendar updated with inpatient warfarin doses/INRs. See calendar for plan.

## 2022-12-28 NOTE — PROGRESS NOTES
Ameena/wife confirmed hospital discharging dosage of Coumadin as 5 mg daily and 12/29/22 INR lab appointment.

## 2022-12-28 NOTE — NURSING
Patient discharged home at this time with wife. Patient ambulating to vehicle with all of his belongings. Patient and wife will stop by outpatient pharmacy on 1st floor to get patients prescriptions for Imdur and Toprol-XL. Patient given discharge instructions, discharge medication reconciliation, follow up appointments/referrals, coumadin education and coumadin diet education. Patient and wife voiced understanding of discharge instructions with no further questions. Patient telemetry discontinued prior to discharge.

## 2022-12-28 NOTE — PLAN OF CARE
[facility]  Discharge Final Note    Primary Care Provider: Devon Langston MD    Expected Discharge Date: 12/28/2022    Future Appointments   Date Time Provider Department Center   12/29/2022  2:00 PM Devon Langston Jr., MD [facility] IM [facility]   12/29/2022  3:00 PM LAB, APPOINTMENT [facility] IN[facility] IM [address] PCW   1/3/2023 11:00 AM PRE-ADMIT, ENDO [facility] END[facility]   1/6/2023  9:00 AM Chandrika Larkin OD [facility] OPTOMTY [address]   1/9/2023 10:00 AM LAB, APPOINTMENT [place] [facility] VNP [facility]   1/25/2023  2:00 PM Brant Devine MD [facility] CARDIO [facility]   2/1/2023  8:00 AM Jesus Bueno MD [facility] DERMSUR [facility]   2/7/2023  7:00 AM LAB, APPOINTMENT [facility] IM [facility] PC[facility]   2/14/2023  9:40 AM Devon Langston Jr., MD [facility]       Pt discharged home with no services.    Kam Velasquez, RN,BSN        Final Discharge Note (most recent)       Final Note - 12/28/22 0940          Final Note    Assessment Type Final Discharge Note     Anticipated Discharge Disposition Home or Self Care     Hospital Resources/Appts/Education Provided Provided patient/caregiver with written discharge plan information;Appointments scheduled and added to AVS        Post-Acute Status    Discharge Delays None known at this time                     Important Message from Medicare  Important Message from Medicare regarding Discharge Appeal Rights: Given to patient/caregiver, Explained to patient/caregiver, Signed/date by patient/caregiver     Date IMM was signed: 12/28/22  Time IMM was signed: 0830    Contact Info       Bhavik Patel MD   Specialty: Otolaryngology    [address] [address]   Phone: [phone]       Next Steps: Follow up in 3 day(s)    Brant Devine MD   Specialty: Interventional Cardiology, Cardiology    [address]  [address]  [place] [address]   Phone: [phone]       Next Steps: Follow up in 1  week(s)

## 2022-12-28 NOTE — DISCHARGE SUMMARY
[facility] [address] [place] [address] Medicine  Discharge Summary      Patient Name: Dariel Urbina  MRN: [id number]  Admission Date: 12/21/2022  Hospital Length of Stay: 6 days  Discharge Date and Time:  12/28/2022 7:59 AM  Attending Physician: Lamberto Blanton MD   Discharging Provider: Lamberto Blanton MD  Primary Care Provider: Devon Langston MD          * No surgery found *      Hospital Course:  82 y.o. male with past medical history of Melanoma, CAD s/p CABG, PCI (11/2021), mechanical aortic valve (2014) on coumadin, HFmEF and diastolic dysfunction, CVA, CKD III, HTN,  presented to the ED with chest pain and epistaxis. Patient reports long-standing history of recurrent epistaxis, more so in colder climate and has had several cauterizations with ENT. He was found to have supratherapeutic INR and his warfarin was held. He experienced chest pain and troponin elevation during hospital stay, at one point in time a reading of hb 6.9. Cardiology was consulted and patient was assessed as symptomatic anemia, type 2 MI/demand ischemia and received blood products with resolution of chest pain. ENT and Interventional Radiology was consulted.  No intervention by IR, initial nasal packing by ENT however patient subsequently had further epistaxis requiring repeat parking with dissolvable nasal packs.  Patient's beta-blocker was held at some points during his hospital course, his pulse rate improved and he was started on a lower dose of metoprolol succinate of which he tolerated, his Imdur dosing was reduced.  Long discussion was held with patient and wife in addition to Cardiology. Collective decision was made to hold antiplatelets for now.  Risks and benefits were discussed in details and patient was restarted on Coumadin however at a lower dose of 5 mg daily.  His INR on day of discharge was 1.2. Collective decision was made to continue warfarin and follow up with INR clinic tomorrow at which point in time to warfarin will likely  start to have an effect, No bridging anticoagulation at this time as he just recovered from significant epistaxis.  On day of discharge patient had a period of greater than 24 hours without epistaxis and was asymptomatic.  He is to follow up with his primary care, Cardiology and ENT.  Patient is to adhere to his follow-ups for melanoma.  His heart failure was stable during hospital stay.        On physical examination.  Elderly obese male in no distress.  Normal external left and right ears.  He has dissolvable nasal packings, nares has no evidence of epistaxis.  He has normal heart rate.  Normal respiratory effort.  He is alert and oriented to time place and person, no focal deficit present.  His mood and thought content is normal.    Consults:   Consults (From admission, onward)          Status Ordering Provider     Inpatient consult to ENT  Once        Provider:  Yesenia Cross MD    Completed CADEN SOARES     Inpatient consult to Interventional Cardiology  Once        Provider:  (Not yet assigned)    Completed KENDRA BAR     [facility]  [facility]        Provider:  (Not yet assigned)    Completed KENDRA BAR     Inpatient consult to Interventional Radiology  Once        Provider:  (Not yet assigned)    Completed KENDRA BAR     Inpatient consult to Cardiology  Once        Provider:  (Not yet assigned)    Completed DEACON WARNER     Inpatient consult to ENT  Once        Provider:  (Not yet assigned)    Completed SHANA MALDONADO     [facility] Medicine PharmD Consult  Once        Provider:  (Not yet assigned)    Acknowledged SHANA MALDONADO            Final Active Diagnoses:    Diagnosis Date Noted POA    PRINCIPAL PROBLEM:  Epistaxis [R04.0] 12/21/2022 Clinically Undetermined    Thrombocytopenia [D69.6] 11/10/2021 Yes    NSTEMI (non-ST elevated myocardial infarction) [I21.4] 03/21/2018 Yes    Bilateral carotid artery disease [I77.9] 02/09/2017 Yes    Stage 3b chronic kidney  disease [N18.32] 05/27/2015 Yes     Chronic    H/O mechanical aortic valve replacement [Z95.2] 09/17/2014 Not Applicable    On warfarin therapy [Z79.01] 09/26/2012 Not Applicable    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] 09/26/2012 Yes    History of gout [Z87.39] 09/26/2012 Yes    Hyperlipidemia associated with type 2 diabetes mellitus [E11.69, E78.5] 09/26/2012 Yes      Problems Resolved During this Admission:    Diagnosis Date Noted Date Resolved POA    Acute blood loss anemia [D62] 12/21/2022 12/25/2022 Yes    Supratherapeutic INR [R79.1] 12/21/2022 12/25/2022 Yes    Chest pain [R07.9] 12/21/2022 12/25/2022 Yes    JIM (acute kidney injury) [N17.9] 12/21/2022 12/25/2022 Yes    Acute on chronic heart failure [I50.9] 10/23/2022 12/25/2022 Yes    Coronary artery disease of bypass graft of native heart with stable angina pectoris [I25.708] 09/11/2012 12/25/2022 Yes      Discharged Condition: stable    Disposition: Home or Self Care    Follow Up:   Follow-up Information       Devon Langston MD Follow up.    Specialty: Internal Medicine  Why: The nurse with Dr. Langston's office will contact you to schedule a hospital follow up visit. However, if you do not hear from them within 24 to 48 hours of discharge please call to schedule the appointment.  Contact information:  [address]  [facility] [address]  [phone]               Bhavik Patel MD Follow up in 3 day(s).    Specialty: Otolaryngology  Contact information:  [address]  [facility] [address]  [phone]               Brant Devine MD Follow up in 1 week(s).    Specialties: Interventional Cardiology, Cardiology  Contact information:  [address]  [facility] [id number] [phone]                           Patient Instructions:      Ambulatory referral/consult to Cardiology   Standing Status: Future   Referral Priority: Routine Referral Type: Consultation   Referral Reason: Specialty Services Required   Requested Specialty: Cardiology    Number of Visits Requested: 1     Notify your health care provider if you experience any of the following:   Order Comments: - Chest pain, Shortness of Breath  -Nosebleeds     Cardiac PET Scan Stress   Standing Status: Future Standing Exp. Date: 12/23/23     Order Specific Question Answer Comments   Which medicaton for the stress procedure? Dobutamine    Release to patient Immediate      Medications:  Reconciled Home Medications:      Medication List        START taking these medications      metoprolol succinate 25 MG 24 hr tablet  Commonly known as: TOPROL-XL  Take 0.5 tablets (12.5 mg total) by mouth once daily.            CHANGE how you take these medications      bumetanide 1 MG tablet  Commonly known as: BUMEX  Take 1 tablet (1 mg total) by mouth once daily.  What changed: additional instructions     isosorbide mononitrate 60 MG 24 hr tablet  Commonly known as: IMDUR  Take 1 tablet (60 mg total) by mouth once daily.  What changed:   medication strength  how much to take     warfarin 5 MG tablet  Commonly known as: COUMADIN  Take 1 tablet (5 mg total) by mouth Daily.  What changed:   how much to take  how to take this  when to take this  additional instructions            CONTINUE taking these medications      allopurinoL 100 MG tablet  Commonly known as: ZYLOPRIM  TAKE 1 TABLET EVERY DAY     ferrous gluconate 324 MG tablet  Commonly known as: FERGON  Take 1 tablet (324 mg total) by mouth daily with breakfast.     lisinopriL 2.5 MG tablet  Commonly known as: PRINIVIL,ZESTRIL  Take 1 tablet (2.5 mg total) by mouth once daily.     loperamide 2 mg capsule  Commonly known as: IMODIUM  Take 2 mg by mouth 4 (four) times daily as needed for Diarrhea.     oxymetazoline 0.05 % nasal spray  Commonly known as: AFRIN  2 sprays by Nasal route daily as needed (nose bleed).     prednisol ace-gatiflox-bromfen 1-0.5-0.075 % Drps  Apply 1 drop to eye 3 (three) times daily. One drop 3 times a day in surgical eye      rosuvastatin 40 MG Tab  Commonly known as: CRESTOR  TAKE 1 TABLET EVERY EVENING.            STOP taking these medications      carvediloL 12.5 MG tablet  Commonly known as: COREG                  Pending Diagnostic Studies:       Procedure Component Value Units Date/Time    CBC auto differential [[id number]] Collected: 12/24/22 1615    Order Status: Sent Lab Status: In process Updated: 12/24/22 1615    Specimen: Blood           Indwelling Lines/Drains at time of discharge:   Lines/Drains/Airways       None                   Time spent on the discharge of patient: 40 minutes         Lamberto Blanton MD  Department of Hospital Medicine  [facility] - Observation 11H

## 2022-12-29 ENCOUNTER — HOSPITAL ENCOUNTER (OUTPATIENT)
Facility: HOSPITAL | Age: 82
Discharge: HOME | End: 2022-12-31
Attending: STUDENT IN AN ORGANIZED HEALTH CARE EDUCATION/TRAINING PROGRAM | Admitting: HOSPITALIST
Payer: MEDICARE

## 2022-12-29 ENCOUNTER — OFFICE VISIT (OUTPATIENT)
Dept: INTERNAL MEDICINE | Facility: CLINIC | Age: 82
End: 2022-12-29
Payer: MEDICARE

## 2022-12-29 VITALS
WEIGHT: 173.31 LBS | OXYGEN SATURATION: 100 % | DIASTOLIC BLOOD PRESSURE: 62 MMHG | BODY MASS INDEX: 28.87 KG/M2 | HEIGHT: 65 IN | HEART RATE: 76 BPM | SYSTOLIC BLOOD PRESSURE: 110 MMHG

## 2022-12-29 DIAGNOSIS — R06.09 DYSPNEA ON EXERTION: Primary | ICD-10-CM

## 2022-12-29 DIAGNOSIS — D64.9 ANEMIA, UNSPECIFIED TYPE: ICD-10-CM

## 2022-12-29 DIAGNOSIS — I50.33 ACUTE ON CHRONIC DIASTOLIC HEART FAILURE: ICD-10-CM

## 2022-12-29 DIAGNOSIS — I48.91 AFIB: ICD-10-CM

## 2022-12-29 DIAGNOSIS — R06.02 SHORTNESS OF BREATH: ICD-10-CM

## 2022-12-29 DIAGNOSIS — R07.9 CHEST PAIN: ICD-10-CM

## 2022-12-29 DIAGNOSIS — I20.89 ANGINA AT REST: Primary | ICD-10-CM

## 2022-12-29 PROBLEM — I25.10 CORONARY ARTERY DISEASE INVOLVING NATIVE CORONARY ARTERY OF NATIVE HEART WITHOUT ANGINA PECTORIS: Chronic | Status: ACTIVE | Noted: 2022-12-29

## 2022-12-29 PROBLEM — I25.10 ATHEROSCLEROSIS OF NATIVE CORONARY ARTERY OF NATIVE HEART WITHOUT ANGINA PECTORIS: Status: ACTIVE | Noted: 2021-09-29

## 2022-12-29 LAB
ALBUMIN SERPL BCP-MCNC: 3.3 G/DL (ref 3.5–5.2)
ALP SERPL-CCNC: 68 U/L (ref 55–135)
ALT SERPL W/O P-5'-P-CCNC: 11 U/L (ref 10–44)
ANION GAP SERPL CALC-SCNC: 4 MMOL/L (ref 8–16)
AST SERPL-CCNC: 15 U/L (ref 10–40)
BASOPHILS # BLD AUTO: 0.03 K/UL (ref 0–0.2)
BASOPHILS NFR BLD: 0.5 % (ref 0–1.9)
BILIRUB SERPL-MCNC: 0.5 MG/DL (ref 0.1–1)
BNP SERPL-MCNC: 1188 PG/ML (ref 0–99)
BUN SERPL-MCNC: 40 MG/DL (ref 8–23)
CALCIUM SERPL-MCNC: 10 MG/DL (ref 8.7–10.5)
CHLORIDE SERPL-SCNC: 116 MMOL/L (ref 95–110)
CO2 SERPL-SCNC: 20 MMOL/L (ref 23–29)
CREAT SERPL-MCNC: 1.8 MG/DL (ref 0.5–1.4)
D DIMER PPP IA.FEU-MCNC: 1.66 MG/L FEU
DIFFERENTIAL METHOD: ABNORMAL
EOSINOPHIL # BLD AUTO: 0.2 K/UL (ref 0–0.5)
EOSINOPHIL NFR BLD: 2.6 % (ref 0–8)
ERYTHROCYTE [DISTWIDTH] IN BLOOD BY AUTOMATED COUNT: 16 % (ref 11.5–14.5)
EST. GFR  (NO RACE VARIABLE): 37.3 ML/MIN/1.73 M^2
GLUCOSE SERPL-MCNC: 89 MG/DL (ref 70–110)
HCT VFR BLD AUTO: 24.2 % (ref 40–54)
HGB BLD-MCNC: 7.6 G/DL (ref 14–18)
IMM GRANULOCYTES # BLD AUTO: 0.01 K/UL (ref 0–0.04)
IMM GRANULOCYTES NFR BLD AUTO: 0.2 % (ref 0–0.5)
INR PPP: 1.2 (ref 0.8–1.2)
LYMPHOCYTES # BLD AUTO: 1.7 K/UL (ref 1–4.8)
LYMPHOCYTES NFR BLD: 28.5 % (ref 18–48)
MCH RBC QN AUTO: 31.4 PG (ref 27–31)
MCHC RBC AUTO-ENTMCNC: 31.4 G/DL (ref 32–36)
MCV RBC AUTO: 100 FL (ref 82–98)
MONOCYTES # BLD AUTO: 0.6 K/UL (ref 0.3–1)
MONOCYTES NFR BLD: 10.2 % (ref 4–15)
NEUTROPHILS # BLD AUTO: 3.5 K/UL (ref 1.8–7.7)
NEUTROPHILS NFR BLD: 58 % (ref 38–73)
NRBC BLD-RTO: 0 /100 WBC
PLATELET # BLD AUTO: 149 K/UL (ref 150–450)
PMV BLD AUTO: 10.3 FL (ref 9.2–12.9)
POTASSIUM SERPL-SCNC: 5.3 MMOL/L (ref 3.5–5.1)
PROT SERPL-MCNC: 6.6 G/DL (ref 6–8.4)
PROTHROMBIN TIME: 12 SEC (ref 9–12.5)
RBC # BLD AUTO: 2.42 M/UL (ref 4.6–6.2)
SODIUM SERPL-SCNC: 140 MMOL/L (ref 136–145)
TROPONIN I SERPL DL<=0.01 NG/ML-MCNC: 0.16 NG/ML (ref 0–0.03)
WBC # BLD AUTO: 6.08 K/UL (ref 3.9–12.7)

## 2022-12-29 PROCEDURE — 99285 EMERGENCY DEPT VISIT HI MDM: CPT | Mod: 25,HCNC

## 2022-12-29 PROCEDURE — 85379 FIBRIN DEGRADATION QUANT: CPT | Mod: HCNC | Performed by: STUDENT IN AN ORGANIZED HEALTH CARE EDUCATION/TRAINING PROGRAM

## 2022-12-29 PROCEDURE — 3072F LOW RISK FOR RETINOPATHY: CPT | Mod: HCNC,CPTII,S$GLB, | Performed by: INTERNAL MEDICINE

## 2022-12-29 PROCEDURE — 93010 EKG 12-LEAD: ICD-10-PCS | Mod: HCNC,,, | Performed by: INTERNAL MEDICINE

## 2022-12-29 PROCEDURE — 93005 ELECTROCARDIOGRAM TRACING: CPT | Mod: HCNC

## 2022-12-29 PROCEDURE — 1125F AMNT PAIN NOTED PAIN PRSNT: CPT | Mod: HCNC,CPTII,S$GLB, | Performed by: INTERNAL MEDICINE

## 2022-12-29 PROCEDURE — 84484 ASSAY OF TROPONIN QUANT: CPT | Mod: HCNC | Performed by: STUDENT IN AN ORGANIZED HEALTH CARE EDUCATION/TRAINING PROGRAM

## 2022-12-29 PROCEDURE — 99999 PR PBB SHADOW E&M-EST. PATIENT-LVL III: CPT | Mod: PBBFAC,HCNC,, | Performed by: INTERNAL MEDICINE

## 2022-12-29 PROCEDURE — 3078F PR MOST RECENT DIASTOLIC BLOOD PRESSURE < 80 MM HG: ICD-10-PCS | Mod: HCNC,CPTII,S$GLB, | Performed by: INTERNAL MEDICINE

## 2022-12-29 PROCEDURE — 63600175 PHARM REV CODE 636 W HCPCS: Mod: HCNC | Performed by: STUDENT IN AN ORGANIZED HEALTH CARE EDUCATION/TRAINING PROGRAM

## 2022-12-29 PROCEDURE — 99499 UNLISTED E&M SERVICE: CPT | Mod: HCNC,S$GLB,, | Performed by: INTERNAL MEDICINE

## 2022-12-29 PROCEDURE — 80053 COMPREHEN METABOLIC PANEL: CPT | Mod: HCNC | Performed by: STUDENT IN AN ORGANIZED HEALTH CARE EDUCATION/TRAINING PROGRAM

## 2022-12-29 PROCEDURE — 3288F PR FALLS RISK ASSESSMENT DOCUMENTED: ICD-10-PCS | Mod: HCNC,CPTII,S$GLB, | Performed by: INTERNAL MEDICINE

## 2022-12-29 PROCEDURE — 3072F PR LOW RISK FOR RETINOPATHY: ICD-10-PCS | Mod: HCNC,CPTII,S$GLB, | Performed by: INTERNAL MEDICINE

## 2022-12-29 PROCEDURE — 99999 PR PBB SHADOW E&M-EST. PATIENT-LVL III: ICD-10-PCS | Mod: PBBFAC,HCNC,, | Performed by: INTERNAL MEDICINE

## 2022-12-29 PROCEDURE — 99495 TCM SERVICES (MODERATE COMPLEXITY): ICD-10-PCS | Mod: HCNC,S$GLB,, | Performed by: INTERNAL MEDICINE

## 2022-12-29 PROCEDURE — 3078F DIAST BP <80 MM HG: CPT | Mod: HCNC,CPTII,S$GLB, | Performed by: INTERNAL MEDICINE

## 2022-12-29 PROCEDURE — 85610 PROTHROMBIN TIME: CPT | Mod: 91,HCNC | Performed by: STUDENT IN AN ORGANIZED HEALTH CARE EDUCATION/TRAINING PROGRAM

## 2022-12-29 PROCEDURE — 83880 ASSAY OF NATRIURETIC PEPTIDE: CPT | Mod: HCNC | Performed by: STUDENT IN AN ORGANIZED HEALTH CARE EDUCATION/TRAINING PROGRAM

## 2022-12-29 PROCEDURE — 96375 TX/PRO/DX INJ NEW DRUG ADDON: CPT | Mod: HCNC

## 2022-12-29 PROCEDURE — 99499 RISK ADDL DX/OHS AUDIT: ICD-10-PCS | Mod: HCNC,S$GLB,, | Performed by: INTERNAL MEDICINE

## 2022-12-29 PROCEDURE — 3074F SYST BP LT 130 MM HG: CPT | Mod: HCNC,CPTII,S$GLB, | Performed by: INTERNAL MEDICINE

## 2022-12-29 PROCEDURE — 85025 COMPLETE CBC W/AUTO DIFF WBC: CPT | Mod: HCNC | Performed by: STUDENT IN AN ORGANIZED HEALTH CARE EDUCATION/TRAINING PROGRAM

## 2022-12-29 PROCEDURE — 99495 TRANSJ CARE MGMT MOD F2F 14D: CPT | Mod: HCNC,S$GLB,, | Performed by: INTERNAL MEDICINE

## 2022-12-29 PROCEDURE — 1101F PR PT FALLS ASSESS DOC 0-1 FALLS W/OUT INJ PAST YR: ICD-10-PCS | Mod: HCNC,CPTII,S$GLB, | Performed by: INTERNAL MEDICINE

## 2022-12-29 PROCEDURE — 93010 ELECTROCARDIOGRAM REPORT: CPT | Mod: HCNC,,, | Performed by: INTERNAL MEDICINE

## 2022-12-29 PROCEDURE — 3074F PR MOST RECENT SYSTOLIC BLOOD PRESSURE < 130 MM HG: ICD-10-PCS | Mod: HCNC,CPTII,S$GLB, | Performed by: INTERNAL MEDICINE

## 2022-12-29 PROCEDURE — G0378 HOSPITAL OBSERVATION PER HR: HCPCS | Mod: HCNC

## 2022-12-29 PROCEDURE — 1101F PT FALLS ASSESS-DOCD LE1/YR: CPT | Mod: HCNC,CPTII,S$GLB, | Performed by: INTERNAL MEDICINE

## 2022-12-29 PROCEDURE — 1125F PR PAIN SEVERITY QUANTIFIED, PAIN PRESENT: ICD-10-PCS | Mod: HCNC,CPTII,S$GLB, | Performed by: INTERNAL MEDICINE

## 2022-12-29 PROCEDURE — 3288F FALL RISK ASSESSMENT DOCD: CPT | Mod: HCNC,CPTII,S$GLB, | Performed by: INTERNAL MEDICINE

## 2022-12-29 PROCEDURE — 99285 EMERGENCY DEPT VISIT HI MDM: CPT | Mod: ,,, | Performed by: STUDENT IN AN ORGANIZED HEALTH CARE EDUCATION/TRAINING PROGRAM

## 2022-12-29 PROCEDURE — 99285 PR EMERGENCY DEPT VISIT,LEVEL V: ICD-10-PCS | Mod: ,,, | Performed by: STUDENT IN AN ORGANIZED HEALTH CARE EDUCATION/TRAINING PROGRAM

## 2022-12-29 RX ORDER — FUROSEMIDE 10 MG/ML
40 INJECTION INTRAMUSCULAR; INTRAVENOUS
Status: COMPLETED | OUTPATIENT
Start: 2022-12-29 | End: 2022-12-29

## 2022-12-29 RX ADMIN — FUROSEMIDE 40 MG: 10 INJECTION, SOLUTION INTRAMUSCULAR; INTRAVENOUS at 10:12

## 2022-12-30 PROBLEM — I50.9 ACUTE DECOMPENSATED HEART FAILURE: Status: ACTIVE | Noted: 2022-12-30

## 2022-12-30 LAB
ABO + RH BLD: NORMAL
ANION GAP SERPL CALC-SCNC: 9 MMOL/L (ref 8–16)
APTT BLDCRRT: 25 SEC (ref 21–32)
APTT BLDCRRT: 33.6 SEC (ref 21–32)
ASCENDING AORTA: 2.61 CM
AV INDEX (PROSTH): 0.38
AV MEAN GRADIENT: 17 MMHG
AV PEAK GRADIENT: 37 MMHG
AV VALVE AREA: 1.25 CM2
AV VELOCITY RATIO: 0.36
BASOPHILS # BLD AUTO: 0.03 K/UL (ref 0–0.2)
BASOPHILS # BLD AUTO: 0.04 K/UL (ref 0–0.2)
BASOPHILS NFR BLD: 0.5 % (ref 0–1.9)
BASOPHILS NFR BLD: 0.5 % (ref 0–1.9)
BLD GP AB SCN CELLS X3 SERPL QL: NORMAL
BSA FOR ECHO PROCEDURE: 1.9 M2
BUN SERPL-MCNC: 37 MG/DL (ref 8–23)
CALCIUM SERPL-MCNC: 9.7 MG/DL (ref 8.7–10.5)
CHLORIDE SERPL-SCNC: 111 MMOL/L (ref 95–110)
CO2 SERPL-SCNC: 19 MMOL/L (ref 23–29)
CREAT SERPL-MCNC: 1.8 MG/DL (ref 0.5–1.4)
CV ECHO LV RWT: 0.43 CM
DIFFERENTIAL METHOD: ABNORMAL
DIFFERENTIAL METHOD: ABNORMAL
DOP CALC AO PEAK VEL: 3.06 M/S
DOP CALC AO VTI: 55.81 CM
DOP CALC LVOT AREA: 3.3 CM2
DOP CALC LVOT DIAMETER: 2.06 CM
DOP CALC LVOT PEAK VEL: 1.09 M/S
DOP CALC LVOT STROKE VOLUME: 69.96 CM3
DOP CALC MV VTI: 29.04 CM
DOP CALCLVOT PEAK VEL VTI: 21 CM
E WAVE DECELERATION TIME: 163.38 MSEC
E/A RATIO: 4.33
E/E' RATIO: 20.43 M/S
ECHO LV POSTERIOR WALL: 1.12 CM (ref 0.6–1.1)
EJECTION FRACTION: 48 %
EOSINOPHIL # BLD AUTO: 0.1 K/UL (ref 0–0.5)
EOSINOPHIL # BLD AUTO: 0.2 K/UL (ref 0–0.5)
EOSINOPHIL NFR BLD: 2.1 % (ref 0–8)
EOSINOPHIL NFR BLD: 2.2 % (ref 0–8)
ERYTHROCYTE [DISTWIDTH] IN BLOOD BY AUTOMATED COUNT: 15.9 % (ref 11.5–14.5)
ERYTHROCYTE [DISTWIDTH] IN BLOOD BY AUTOMATED COUNT: 16 % (ref 11.5–14.5)
EST. GFR  (NO RACE VARIABLE): 37.3 ML/MIN/1.73 M^2
FRACTIONAL SHORTENING: 21 % (ref 28–44)
GLUCOSE SERPL-MCNC: 75 MG/DL (ref 70–110)
HCT VFR BLD AUTO: 24.6 % (ref 40–54)
HCT VFR BLD AUTO: 26.8 % (ref 40–54)
HGB BLD-MCNC: 7.5 G/DL (ref 14–18)
HGB BLD-MCNC: 8 G/DL (ref 14–18)
IMM GRANULOCYTES # BLD AUTO: 0.02 K/UL (ref 0–0.04)
IMM GRANULOCYTES # BLD AUTO: 0.03 K/UL (ref 0–0.04)
IMM GRANULOCYTES NFR BLD AUTO: 0.3 % (ref 0–0.5)
IMM GRANULOCYTES NFR BLD AUTO: 0.4 % (ref 0–0.5)
INR PPP: 1.2 (ref 0.8–1.2)
INR PPP: 1.2 (ref 0.8–1.2)
INTERVENTRICULAR SEPTUM: 1.15 CM (ref 0.6–1.1)
LA MAJOR: 7.17 CM
LA MINOR: 7.3 CM
LA WIDTH: 5.8 CM
LEFT ATRIUM SIZE: 5.44 CM
LEFT ATRIUM VOLUME INDEX MOD: 93.6 ML/M2
LEFT ATRIUM VOLUME INDEX: 104.3 ML/M2
LEFT ATRIUM VOLUME MOD: 174.14 CM3
LEFT ATRIUM VOLUME: 194.02 CM3
LEFT INTERNAL DIMENSION IN SYSTOLE: 4.09 CM (ref 2.1–4)
LEFT VENTRICLE DIASTOLIC VOLUME INDEX: 69.92 ML/M2
LEFT VENTRICLE DIASTOLIC VOLUME: 130.05 ML
LEFT VENTRICLE MASS INDEX: 124 G/M2
LEFT VENTRICLE SYSTOLIC VOLUME INDEX: 39.6 ML/M2
LEFT VENTRICLE SYSTOLIC VOLUME: 73.61 ML
LEFT VENTRICULAR INTERNAL DIMENSION IN DIASTOLE: 5.21 CM (ref 3.5–6)
LEFT VENTRICULAR MASS: 231.14 G
LV LATERAL E/E' RATIO: 17.88 M/S
LV SEPTAL E/E' RATIO: 23.83 M/S
LYMPHOCYTES # BLD AUTO: 1.5 K/UL (ref 1–4.8)
LYMPHOCYTES # BLD AUTO: 1.6 K/UL (ref 1–4.8)
LYMPHOCYTES NFR BLD: 22.1 % (ref 18–48)
LYMPHOCYTES NFR BLD: 26.5 % (ref 18–48)
MAGNESIUM SERPL-MCNC: 1.7 MG/DL (ref 1.6–2.6)
MCH RBC QN AUTO: 30.6 PG (ref 27–31)
MCH RBC QN AUTO: 30.7 PG (ref 27–31)
MCHC RBC AUTO-ENTMCNC: 29.9 G/DL (ref 32–36)
MCHC RBC AUTO-ENTMCNC: 30.5 G/DL (ref 32–36)
MCV RBC AUTO: 100 FL (ref 82–98)
MCV RBC AUTO: 103 FL (ref 82–98)
MONOCYTES # BLD AUTO: 0.6 K/UL (ref 0.3–1)
MONOCYTES # BLD AUTO: 0.8 K/UL (ref 0.3–1)
MONOCYTES NFR BLD: 10.4 % (ref 4–15)
MONOCYTES NFR BLD: 11 % (ref 4–15)
MV MEAN GRADIENT: 4 MMHG
MV PEAK A VEL: 0.33 M/S
MV PEAK E VEL: 1.43 M/S
MV PEAK GRADIENT: 11 MMHG
MV STENOSIS PRESSURE HALF TIME: 47.38 MS
MV VALVE AREA BY CONTINUITY EQUATION: 2.41 CM2
MV VALVE AREA P 1/2 METHOD: 4.64 CM2
NEUTROPHILS # BLD AUTO: 3.5 K/UL (ref 1.8–7.7)
NEUTROPHILS # BLD AUTO: 4.8 K/UL (ref 1.8–7.7)
NEUTROPHILS NFR BLD: 59.6 % (ref 38–73)
NEUTROPHILS NFR BLD: 64.4 % (ref 38–73)
NRBC BLD-RTO: 0 /100 WBC
NRBC BLD-RTO: 0 /100 WBC
PHOSPHATE SERPL-MCNC: 2.3 MG/DL (ref 2.7–4.5)
PISA MRMAX VEL: 0.06 M/S
PISA TR MAX VEL: 3.85 M/S
PLATELET # BLD AUTO: 167 K/UL (ref 150–450)
PLATELET # BLD AUTO: 179 K/UL (ref 150–450)
PMV BLD AUTO: 10 FL (ref 9.2–12.9)
PMV BLD AUTO: 11.1 FL (ref 9.2–12.9)
POCT GLUCOSE: 121 MG/DL (ref 70–110)
POCT GLUCOSE: 97 MG/DL (ref 70–110)
POTASSIUM SERPL-SCNC: 5 MMOL/L (ref 3.5–5.1)
PROTHROMBIN TIME: 12.1 SEC (ref 9–12.5)
PROTHROMBIN TIME: 12.1 SEC (ref 9–12.5)
RA MAJOR: 5.43 CM
RA PRESSURE: 3 MMHG
RA WIDTH: 3.59 CM
RBC # BLD AUTO: 2.45 M/UL (ref 4.6–6.2)
RBC # BLD AUTO: 2.61 M/UL (ref 4.6–6.2)
RIGHT VENTRICULAR END-DIASTOLIC DIMENSION: 3.95 CM
SINUS: 3.15 CM
SODIUM SERPL-SCNC: 139 MMOL/L (ref 136–145)
STJ: 2.49 CM
TDI LATERAL: 0.08 M/S
TDI SEPTAL: 0.06 M/S
TDI: 0.07 M/S
TR MAX PG: 59 MMHG
TRICUSPID ANNULAR PLANE SYSTOLIC EXCURSION: 1.73 CM
TV REST PULMONARY ARTERY PRESSURE: 62 MMHG
WBC # BLD AUTO: 5.82 K/UL (ref 3.9–12.7)
WBC # BLD AUTO: 7.42 K/UL (ref 3.9–12.7)

## 2022-12-30 PROCEDURE — 97161 PT EVAL LOW COMPLEX 20 MIN: CPT | Mod: HCNC

## 2022-12-30 PROCEDURE — 80048 BASIC METABOLIC PNL TOTAL CA: CPT | Mod: HCNC | Performed by: HOSPITALIST

## 2022-12-30 PROCEDURE — 25000003 PHARM REV CODE 250: Mod: HCNC | Performed by: HOSPITALIST

## 2022-12-30 PROCEDURE — G0378 HOSPITAL OBSERVATION PER HR: HCPCS | Mod: HCNC

## 2022-12-30 PROCEDURE — 96376 TX/PRO/DX INJ SAME DRUG ADON: CPT | Mod: HCNC,59

## 2022-12-30 PROCEDURE — 63600175 PHARM REV CODE 636 W HCPCS: Mod: HCNC

## 2022-12-30 PROCEDURE — 82962 GLUCOSE BLOOD TEST: CPT | Mod: HCNC

## 2022-12-30 PROCEDURE — 99219 PR INITIAL OBSERVATION CARE,LEVL II: ICD-10-PCS | Mod: HCNC,GC,, | Performed by: INTERNAL MEDICINE

## 2022-12-30 PROCEDURE — 85025 COMPLETE CBC W/AUTO DIFF WBC: CPT | Mod: 91,HCNC | Performed by: HOSPITALIST

## 2022-12-30 PROCEDURE — 85025 COMPLETE CBC W/AUTO DIFF WBC: CPT | Mod: HCNC | Performed by: HOSPITALIST

## 2022-12-30 PROCEDURE — 83735 ASSAY OF MAGNESIUM: CPT | Mod: HCNC | Performed by: HOSPITALIST

## 2022-12-30 PROCEDURE — 63600175 PHARM REV CODE 636 W HCPCS: Mod: HCNC | Performed by: HOSPITALIST

## 2022-12-30 PROCEDURE — 85610 PROTHROMBIN TIME: CPT | Mod: 91,HCNC | Performed by: HOSPITALIST

## 2022-12-30 PROCEDURE — 99220 PR INITIAL OBSERVATION CARE,LEVL III: CPT | Mod: HCNC,,, | Performed by: HOSPITALIST

## 2022-12-30 PROCEDURE — 99219 PR INITIAL OBSERVATION CARE,LEVL II: CPT | Mod: HCNC,GC,, | Performed by: INTERNAL MEDICINE

## 2022-12-30 PROCEDURE — 99220 PR INITIAL OBSERVATION CARE,LEVL III: ICD-10-PCS | Mod: HCNC,,, | Performed by: HOSPITALIST

## 2022-12-30 PROCEDURE — 84100 ASSAY OF PHOSPHORUS: CPT | Mod: HCNC | Performed by: HOSPITALIST

## 2022-12-30 PROCEDURE — 96366 THER/PROPH/DIAG IV INF ADDON: CPT

## 2022-12-30 PROCEDURE — 94761 N-INVAS EAR/PLS OXIMETRY MLT: CPT | Mod: HCNC

## 2022-12-30 PROCEDURE — 36415 COLL VENOUS BLD VENIPUNCTURE: CPT | Mod: HCNC | Performed by: HOSPITALIST

## 2022-12-30 PROCEDURE — 96365 THER/PROPH/DIAG IV INF INIT: CPT | Mod: 59

## 2022-12-30 PROCEDURE — 86900 BLOOD TYPING SEROLOGIC ABO: CPT | Mod: HCNC | Performed by: HOSPITALIST

## 2022-12-30 PROCEDURE — 85730 THROMBOPLASTIN TIME PARTIAL: CPT | Mod: HCNC | Performed by: HOSPITALIST

## 2022-12-30 RX ORDER — PREDNISOLONE ACETATE-GATIFLOXACIN-BROMFENAC .75; 5; 1 MG/ML; MG/ML; MG/ML
1 SUSPENSION/ DROPS OPHTHALMIC 3 TIMES DAILY
Status: DISCONTINUED | OUTPATIENT
Start: 2022-12-30 | End: 2022-12-31

## 2022-12-30 RX ORDER — NALOXONE HCL 0.4 MG/ML
0.02 VIAL (ML) INJECTION
Status: DISCONTINUED | OUTPATIENT
Start: 2022-12-30 | End: 2022-12-30

## 2022-12-30 RX ORDER — TALC
6 POWDER (GRAM) TOPICAL NIGHTLY PRN
Status: DISCONTINUED | OUTPATIENT
Start: 2022-12-30 | End: 2022-12-31 | Stop reason: HOSPADM

## 2022-12-30 RX ORDER — IBUPROFEN 200 MG
24 TABLET ORAL
Status: DISCONTINUED | OUTPATIENT
Start: 2022-12-30 | End: 2022-12-31 | Stop reason: HOSPADM

## 2022-12-30 RX ORDER — ALPRAZOLAM 0.25 MG/1
0.25 TABLET ORAL 2 TIMES DAILY PRN
Status: DISCONTINUED | OUTPATIENT
Start: 2022-12-30 | End: 2022-12-31 | Stop reason: HOSPADM

## 2022-12-30 RX ORDER — IBUPROFEN 200 MG
16 TABLET ORAL
Status: DISCONTINUED | OUTPATIENT
Start: 2022-12-30 | End: 2022-12-31 | Stop reason: HOSPADM

## 2022-12-30 RX ORDER — ATORVASTATIN CALCIUM 20 MG/1
80 TABLET, FILM COATED ORAL NIGHTLY
Status: DISCONTINUED | OUTPATIENT
Start: 2022-12-30 | End: 2022-12-31 | Stop reason: HOSPADM

## 2022-12-30 RX ORDER — FERROUS GLUCONATE 324(37.5)
324 TABLET ORAL
Status: DISCONTINUED | OUTPATIENT
Start: 2022-12-30 | End: 2022-12-31 | Stop reason: HOSPADM

## 2022-12-30 RX ORDER — ISOSORBIDE MONONITRATE 60 MG/1
60 TABLET, EXTENDED RELEASE ORAL DAILY
Status: DISCONTINUED | OUTPATIENT
Start: 2022-12-30 | End: 2022-12-31 | Stop reason: HOSPADM

## 2022-12-30 RX ORDER — LISINOPRIL 2.5 MG/1
2.5 TABLET ORAL DAILY
Status: DISCONTINUED | OUTPATIENT
Start: 2022-12-30 | End: 2022-12-30

## 2022-12-30 RX ORDER — OXYMETAZOLINE HCL 0.05 %
2 SPRAY, NON-AEROSOL (ML) NASAL DAILY PRN
Status: DISCONTINUED | OUTPATIENT
Start: 2022-12-30 | End: 2022-12-31 | Stop reason: HOSPADM

## 2022-12-30 RX ORDER — LOPERAMIDE HYDROCHLORIDE 2 MG/1
2 CAPSULE ORAL 4 TIMES DAILY PRN
Status: DISCONTINUED | OUTPATIENT
Start: 2022-12-30 | End: 2022-12-31 | Stop reason: HOSPADM

## 2022-12-30 RX ORDER — FUROSEMIDE 10 MG/ML
40 INJECTION INTRAMUSCULAR; INTRAVENOUS ONCE
Status: DISCONTINUED | OUTPATIENT
Start: 2022-12-30 | End: 2022-12-30

## 2022-12-30 RX ORDER — ONDANSETRON 2 MG/ML
4 INJECTION INTRAMUSCULAR; INTRAVENOUS EVERY 6 HOURS PRN
Status: DISCONTINUED | OUTPATIENT
Start: 2022-12-30 | End: 2022-12-31 | Stop reason: HOSPADM

## 2022-12-30 RX ORDER — PROCHLORPERAZINE EDISYLATE 5 MG/ML
5 INJECTION INTRAMUSCULAR; INTRAVENOUS EVERY 6 HOURS PRN
Status: DISCONTINUED | OUTPATIENT
Start: 2022-12-30 | End: 2022-12-31 | Stop reason: HOSPADM

## 2022-12-30 RX ORDER — HEPARIN SODIUM,PORCINE/D5W 25000/250
0-40 INTRAVENOUS SOLUTION INTRAVENOUS CONTINUOUS
Status: DISCONTINUED | OUTPATIENT
Start: 2022-12-30 | End: 2022-12-31

## 2022-12-30 RX ORDER — LISINOPRIL 2.5 MG/1
2.5 TABLET ORAL DAILY
Status: DISCONTINUED | OUTPATIENT
Start: 2022-12-31 | End: 2022-12-31 | Stop reason: HOSPADM

## 2022-12-30 RX ORDER — WARFARIN SODIUM 5 MG/1
5 TABLET ORAL ONCE
Status: COMPLETED | OUTPATIENT
Start: 2022-12-30 | End: 2022-12-30

## 2022-12-30 RX ORDER — FUROSEMIDE 10 MG/ML
60 INJECTION INTRAMUSCULAR; INTRAVENOUS DAILY
Status: DISCONTINUED | OUTPATIENT
Start: 2022-12-30 | End: 2022-12-30

## 2022-12-30 RX ORDER — SODIUM CHLORIDE 0.9 % (FLUSH) 0.9 %
10 SYRINGE (ML) INJECTION EVERY 6 HOURS PRN
Status: DISCONTINUED | OUTPATIENT
Start: 2022-12-30 | End: 2022-12-31 | Stop reason: HOSPADM

## 2022-12-30 RX ORDER — FUROSEMIDE 10 MG/ML
40 INJECTION INTRAMUSCULAR; INTRAVENOUS 2 TIMES DAILY
Status: DISCONTINUED | OUTPATIENT
Start: 2022-12-30 | End: 2022-12-30

## 2022-12-30 RX ORDER — FUROSEMIDE 10 MG/ML
80 INJECTION INTRAMUSCULAR; INTRAVENOUS
Status: DISCONTINUED | OUTPATIENT
Start: 2022-12-30 | End: 2022-12-31

## 2022-12-30 RX ORDER — ACETAMINOPHEN 325 MG/1
650 TABLET ORAL EVERY 6 HOURS PRN
Status: DISCONTINUED | OUTPATIENT
Start: 2022-12-30 | End: 2022-12-30

## 2022-12-30 RX ORDER — ACETAMINOPHEN 325 MG/1
650 TABLET ORAL EVERY 4 HOURS PRN
Status: DISCONTINUED | OUTPATIENT
Start: 2022-12-30 | End: 2022-12-31 | Stop reason: HOSPADM

## 2022-12-30 RX ORDER — MAG HYDROX/ALUMINUM HYD/SIMETH 200-200-20
30 SUSPENSION, ORAL (FINAL DOSE FORM) ORAL 4 TIMES DAILY PRN
Status: DISCONTINUED | OUTPATIENT
Start: 2022-12-30 | End: 2022-12-31 | Stop reason: HOSPADM

## 2022-12-30 RX ORDER — GLUCAGON 1 MG
1 KIT INJECTION
Status: DISCONTINUED | OUTPATIENT
Start: 2022-12-30 | End: 2022-12-31 | Stop reason: HOSPADM

## 2022-12-30 RX ORDER — NALOXONE HCL 0.4 MG/ML
0.02 VIAL (ML) INJECTION
Status: DISCONTINUED | OUTPATIENT
Start: 2022-12-30 | End: 2022-12-31 | Stop reason: HOSPADM

## 2022-12-30 RX ORDER — WARFARIN SODIUM 5 MG/1
5 TABLET ORAL DAILY
Status: DISCONTINUED | OUTPATIENT
Start: 2022-12-30 | End: 2022-12-31 | Stop reason: HOSPADM

## 2022-12-30 RX ORDER — INSULIN ASPART 100 [IU]/ML
0-5 INJECTION, SOLUTION INTRAVENOUS; SUBCUTANEOUS
Status: DISCONTINUED | OUTPATIENT
Start: 2022-12-30 | End: 2022-12-31 | Stop reason: HOSPADM

## 2022-12-30 RX ORDER — IPRATROPIUM BROMIDE AND ALBUTEROL SULFATE 2.5; .5 MG/3ML; MG/3ML
3 SOLUTION RESPIRATORY (INHALATION) EVERY 4 HOURS PRN
Status: DISCONTINUED | OUTPATIENT
Start: 2022-12-30 | End: 2022-12-31 | Stop reason: HOSPADM

## 2022-12-30 RX ADMIN — FUROSEMIDE 80 MG: 10 INJECTION, SOLUTION INTRAMUSCULAR; INTRAVENOUS at 02:12

## 2022-12-30 RX ADMIN — ATORVASTATIN CALCIUM 80 MG: 40 TABLET, FILM COATED ORAL at 01:12

## 2022-12-30 RX ADMIN — WARFARIN SODIUM 5 MG: 5 TABLET ORAL at 06:12

## 2022-12-30 RX ADMIN — ATORVASTATIN CALCIUM 80 MG: 40 TABLET, FILM COATED ORAL at 08:12

## 2022-12-30 RX ADMIN — ALLOPURINOL 100 MG: 100 TABLET ORAL at 09:12

## 2022-12-30 RX ADMIN — WARFARIN SODIUM 5 MG: 5 TABLET ORAL at 01:12

## 2022-12-30 RX ADMIN — HEPARIN SODIUM AND DEXTROSE 12 UNITS/KG/HR: 10000; 5 INJECTION INTRAVENOUS at 04:12

## 2022-12-30 RX ADMIN — ISOSORBIDE MONONITRATE 60 MG: 60 TABLET, EXTENDED RELEASE ORAL at 09:12

## 2022-12-30 RX ADMIN — FUROSEMIDE 40 MG: 10 INJECTION, SOLUTION INTRAMUSCULAR; INTRAVENOUS at 10:12

## 2022-12-30 NOTE — PROGRESS NOTES
"This note was generated with [employer] voice recognition software. I apologize for any possible typographical errors.      He is a 82-year-old gentleman hospitalized from the 22nd to 28th of this month.  He had epistaxis due to his anticoagulation.  He is had history of gastrointestinal hemorrhage is also.  He has a mechanical aortic valve that is placed though he needs to be on chronic anticoagulation.  He went in the hospital with a nosebleed and chest pain.  In the hospital he is troponins were positive and Cardiology was consulted and thought that this was a demand issue since he was so anemic.  H&H in the hospitalist always under 8 and 23.  When his hemoglobin got down to 6.9 he received 2 units of blood.  With the blood he stops having chest pain at rest and he was discharged yesterday.  Since then he has been short of breath and has chest pain in the center of his chest similar to his chest pain when he had his myocardial infarction.  He has chest pain when he gets up and takes 5 quick steps or when he walks a normal pace for about 20 ft.  He is had chronic shortness of breath and has history of heart failure but this is a much more acute issue and he is not able to do his much before he gets the shortness of breath and chest pain.  He has not had any more bleeding since he left the hospital yesterday.  His nose was packed in the hospital in the bleeding was stopped some around the middle of his hospitalization.  He is back on his Coumadin but at a lower dose.    /62 (BP Location: Right arm, Patient Position: Sitting, BP Method: Medium (Manual))   Pulse 76   Ht 5' 5" (1.651 m)   Wt 78.6 kg (173 lb 4.5 oz)   SpO2 100%   BMI 28.84 kg/m²   Eaten looks chronically ill.  He conjunctiva is pale.  Neck is supple.  Pulse ox is 100% pulse is 76.  Cardiovascular exam shows a normal rhythm.  Is having metallic click.    Assessment and plan:  Angina at rest    Anemia, unspecified type     This is a gentleman " that is discharged yesterday after having a MI due to anemia complaining of the same complaints he had before he went to the hospital.  His H&H on discharge was 7.0 and 22.6.  His bleeding has stopped but he is having angina with very minimum activity.  I advised him to go back to emergency room, had a CBC checked, probably due ruled out for another myocardial infarction and possibly received blood products.

## 2022-12-30 NOTE — ED TRIAGE NOTES
"Dariel Urbina, an 82 y.o. male presents to the ED from primary care for a blood transfusion. Recently d/c from the hospital where he was admitted for nose bleeds and chest pain. Received two blood transfusions while in the hospital. States he feels great until he takes a couple of steps and he becomes short of breath and gets chest pain.       LOC: The patient is awake, alert and aware of environment with an appropriate affect, the patient is oriented x 3 and speaking appropriately.   APPEARANCE: Patient appears comfortable and in no acute distress, patient is clean and well groomed.  SKIN: The skin is warm and dry, color consistent with ethnicity.   MUSCULOSKELETAL: Patient moving all extremities spontaneously, no swelling noted.  RESPIRATORY: Airway is open and patent, respirations are spontaneous, patient has a normal effort and rate, no accessory muscle use noted.  CARDIAC: Patient has a normal rate and regular rhythm, no edema noted, capillary refill < 3 seconds.   GASTRO: Soft and non tender to palpation, no distention noted.   : Pt denies any pain or frequency with urination.  NEURO: Pt opens eyes spontaneously, behavior appropriate to situation, follows commands.        Chief Complaint   Patient presents with    Abnormal Lab     Patient reports he was instructed to come to ER due to "needing a pint of blood". Patient reports he was discharged from the hospital yesterday after being treated for "chest pain and nosebleeds". Patient declines any pain.      Review of patient's allergies indicates:   Allergen Reactions    Fosinopril      Intolerance- elevates potassium level      Losartan      Intolerance- elevates potassium level     Past Medical History:   Diagnosis Date    Anemia of chronic renal failure, stage 3 (moderate) 05/27/2015    Anticoagulant long-term use     Atherosclerosis of coronary artery bypass graft of native heart without angina pectoris 09/11/2012    3-27-18 [facility] Two vessel coronary " artery disease.   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    Bilateral carotid artery disease 02/09/2017    Bleeding from the nose     Bleeding nose 03/21/2018    Cataract     CKD (chronic kidney disease) stage 3, GFR 30-59 ml/min 05/27/2015    Claudication of left lower extremity 09/17/2014    Colon polyp     Encounter for blood transfusion     Gastroesophageal reflux disease without esophagitis 03/19/2018    Gastrointestinal hemorrhage associated with intestinal diverticulosis 04/01/2018    Glaucoma     H/O mechanical aortic valve replacement 09/17/2014    History of gout 09/26/2012    Hyperparathyroidism due to renal insufficiency 07/27/2015    Internal hemorrhoid 04/03/2018    Long term current use of anticoagulant therapy 09/26/2012    Mechanical heart valve present     Metabolic acidosis with normal anion gap and bicarbonate losses 03/20/2018    Mixed hyperlipidemia 09/26/2012    NSTEMI (non-ST elevated myocardial infarction) 03/21/2018    Obesity, diabetes, and hypertension syndrome 02/23/2016    PVD (peripheral vascular disease) 09/11/2012    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    Syncope 09/29/2022    Type 2 diabetes mellitus with diabetic peripheral angiopathy without gangrene 05/27/2015    Type 2 diabetes mellitus with stage 3 chronic kidney disease, without long-term current use of insulin 10/02/2013

## 2022-12-30 NOTE — ASSESSMENT & PLAN NOTE
Patient's FSGs are controlled on current medication regimen.  Last A1c reviewed-   Lab Results   Component Value Date    HGBA1C 5.4 10/03/2022     Most recent fingerstick glucose reviewed- No results for input(s): POCTGLUCOSE in the last 24 hours.  Current correctional scale  Low  Maintain anti-hyperglycemic dose as follows-   Antihyperglycemics (From admission, onward)    Start     Stop Route Frequency Ordered    12/30/22 0107  insulin aspart U-100 pen 0-5 Units         -- SubQ Before meals & nightly PRN 12/30/22 0009        Hold Oral hypoglycemics while patient is in the hospital.

## 2022-12-30 NOTE — CONSULTS
[facility] - Emergency Dept  Cardiology  Consult Note    Patient Name: Dariel Urbina  MRN: [id number]  Admission Date: 12/29/2022  Hospital Length of Stay: 0 days  Code Status: Full Code   Attending Provider: Maeve Huang MD   Consulting Provider: Barbie Zelaya MD  Primary Care Physician: Devon Langston MD  Principal Problem:Acute decompensated heart failure    Patient information was obtained from patient, past medical records and ER records.     Consults  Subjective:     Chief Complaint:  sob     HPI:   82 y.o. male with past medical history of Melanoma, CAD s/p CABG, PCI (11/2021), mechanical aortic valve (2014) on coumadin, HFmEF and diastolic dysfunction, CVA, CKD III, HTN, discharged yesterday after admission for symptomatic anemia (Hb 6.9), type 2 MI/demand ischemia and received blood products (2-3u) with resolution of chest pain. Anemia was thought to be from long-standing history of recurrent epistaxis, more so in colder climate and has had several cauterizations with ENT. He was found to have supratherapeutic INR and his warfarin was held; however restarted at discharge without bridging as he had just recovered from significant epistaxis. He was also managed for NSTEMI given CP andTrop elevated to 2. Concern for ACS at the time although picture complicated by epistaxis therefore he was optimized medically as tolerated with Toprol XL 25MG daily, Entresto 24/26 BID, astatin 80 and advised to have outpatient PET stress test for ischemic evaluation.      Today he presents with complaints of worsening shortness of breath at rest and with minimal exertion. He reports a decrease in appetitie. No evidence of bleeding. No recurrence of epistaxis. He is on Bumex 1 mg daily. CXR on arrival clear bilaterally. Bedside echo done which showed EF approx 40-45% and inferolateral WMA; IVC 1.96cm and >50% collapsible suggestive of CVP 8. Troponin is downtrending from prior admission 0.164, bnp elevated at 1188. ECG  with no evidence of acute ischemia, AF, rate controlled.      Past Medical History:   Diagnosis Date    Anemia of chronic renal failure, stage 3 (moderate) 05/27/2015    Anticoagulant long-term use     Atherosclerosis of coronary artery bypass graft of native heart without angina pectoris 09/11/2012    3-27-18 [facility] Two vessel coronary artery disease.   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    Bilateral carotid artery disease 02/09/2017    Bleeding from the nose     Bleeding nose 03/21/2018    Cataract     CKD (chronic kidney disease) stage 3, GFR 30-59 ml/min 05/27/2015    Claudication of left lower extremity 09/17/2014    Colon polyp     Encounter for blood transfusion     Gastroesophageal reflux disease without esophagitis 03/19/2018    Gastrointestinal hemorrhage associated with intestinal diverticulosis 04/01/2018    Glaucoma     H/O mechanical aortic valve replacement 09/17/2014    History of gout 09/26/2012    Hyperparathyroidism due to renal insufficiency 07/27/2015    Internal hemorrhoid 04/03/2018    Long term current use of anticoagulant therapy 09/26/2012    Mechanical heart valve present     Metabolic acidosis with normal anion gap and bicarbonate losses 03/20/2018    Mixed hyperlipidemia 09/26/2012    NSTEMI (non-ST elevated myocardial infarction) 03/21/2018    Obesity, diabetes, and hypertension syndrome 02/23/2016    PVD (peripheral vascular disease) 09/11/2012    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    Syncope 09/29/2022    Type 2 diabetes mellitus with diabetic peripheral angiopathy without gangrene 05/27/2015    Type 2 diabetes mellitus with stage 3 chronic kidney disease, without long-term current use of insulin 10/02/2013       Past Surgical History:   Procedure Laterality Date    CARDIAC CATHETERIZATION      CARDIAC VALVE REPLACEMENT      CARDIAC VALVE SURGERY      CARPAL TUNNEL RELEASE Right 05/19/2020    Procedure: RELEASE, CARPAL  TUNNEL;  Surgeon: Rupesh Norris Jr., MD;  Location: [facility];  Service: Plastics;  Laterality: Right;    CATARACT EXTRACTION Left 11/13/2022        COLON SURGERY      COLONOSCOPY N/A 03/31/2017    Procedure: COLONOSCOPY;  Surgeon: Bruno Raymond MD;  Location: [facility] (4TH FLR);  Service: Endoscopy;  Laterality: N/A;  Patient's wife requesting date.    COLONOSCOPY N/A 04/03/2018    Procedure: COLONOSCOPY;  Surgeon: Bonifacio Pelletier MD;  Location: [facility] ENDO (2ND FLR);  Service: Endoscopy;  Laterality: N/A;    COLONOSCOPY N/A 08/13/2018    Procedure: COLONOSCOPY;  Surgeon: Kam Barba MD;  Location: [facility] (2ND FLR);  Service: Endoscopy;  Laterality: N/A;  2nd floor: PA pressure 49; hx of moderate-severe valve disease     per Coumadin clinic-Patient can hold 5 days with lovenox bridge       ok to schedule per Katarina    CORONARY ANGIOGRAPHY N/A 10/04/2021    Procedure: Left heart cath +/- peripheral angiogram;  Surgeon: Jose Ruiz MD;  Location: [facility] CATH LAB;  Service: Cardiology;  Laterality: N/A;    CORONARY ANGIOPLASTY      CORONARY ARTERY BYPASS GRAFT      CORONARY BYPASS GRAFT ANGIOGRAPHY  10/04/2021    Procedure: Bypass graft study;  Surgeon: Jose Ruiz MD;  Location: [facility] CATH LAB;  Service: Cardiology;;    HERNIA REPAIR      INTRAOCULAR PROSTHESES INSERTION Left 11/13/2022    Procedure: INSERTION, IOL PROSTHESIS;  Surgeon: Alia Mckeon MD;  Location: [address];  Service: Ophthalmology;  Laterality: Left;    INTRAOCULAR PROSTHESES INSERTION Right 12/4/2022    Procedure: INSERTION, IOL PROSTHESIS;  Surgeon: Alia Mckeon MD;  Location: [address];  Service: Ophthalmology;  Laterality: Right;    PHACOEMULSIFICATION OF CATARACT Left 11/13/2022    Procedure: PHACOEMULSIFICATION, CATARACT;  Surgeon: Alia Mckeon MD;  Location: [address];  Service: Ophthalmology;  Laterality: Left;    PHACOEMULSIFICATION OF CATARACT Right 12/4/2022     Procedure: PHACOEMULSIFICATION, CATARACT;  Surgeon: Alia Mckeon MD;  Location: [facility] OR [address] [place] [address];  Service: Ophthalmology;  Laterality: Right;    SPINE SURGERY      VASECTOMY         Review of patient's allergies indicates:   Allergen Reactions    Fosinopril      Intolerance- elevates potassium level      Losartan      Intolerance- elevates potassium level       Current Facility-Administered Medications on File Prior to Encounter   Medication    ondansetron injection 4 mg    ondansetron injection 4 mg    phenylephrine HCL 2.5% ophthalmic solution 1 drop    phenylephrine HCL 2.5% ophthalmic solution 1 drop    proparacaine 0.5 % ophthalmic solution 1 drop    proparacaine 0.5 % ophthalmic solution 1 drop    tropicamide 1% ophthalmic solution 1 drop    tropicamide 1% ophthalmic solution 1 drop     Current Outpatient Medications on File Prior to Encounter   Medication Sig    allopurinoL (ZYLOPRIM) 100 MG tablet TAKE 1 TABLET EVERY DAY    bumetanide (BUMEX) 1 MG tablet Take 1 tablet (1 mg total) by mouth once daily.    ferrous gluconate (FERGON) 324 MG tablet Take 1 tablet (324 mg total) by mouth daily with breakfast.    isosorbide mononitrate (IMDUR) 60 MG 24 hr tablet Take 1 tablet (60 mg total) by mouth once daily.    lisinopriL (PRINIVIL,ZESTRIL) 2.5 MG tablet Take 1 tablet (2.5 mg total) by mouth once daily.    loperamide (IMODIUM) 2 mg capsule Take 2 mg by mouth 4 (four) times daily as needed for Diarrhea.    metoprolol succinate (TOPROL-XL) 25 MG 24 hr tablet Take 0.5 tablets (12.5 mg total) by mouth once daily.    oxymetazoline (AFRIN) 0.05 % nasal spray 2 sprays by Nasal route daily as needed (nose bleed).    prednisolon/gatiflox/bromfenac (PREDNISOL ACE-GATIFLOX-BROMFEN) 1-0.5-0.075 % DrpS Apply 1 drop to eye 3 (three) times daily. One drop 3 times a day in surgical eye    rosuvastatin (CRESTOR) 40 MG Tab TAKE 1 TABLET EVERY EVENING.    warfarin (COUMADIN) 5 MG tablet Take 1 tablet (5  mg total) by mouth Daily.     Family History       Problem Relation (Age of Onset)    Alcohol abuse Father    Diabetes Brother    Heart disease Mother, Father, Brother, Sister    Heart failure Mother, Father, Brother    No Known Problems Sister, Maternal Grandmother, Maternal Grandfather, Paternal Grandmother, Paternal Grandfather, Maternal Aunt, Maternal Uncle, Paternal Aunt, Paternal Uncle          Tobacco Use    Smoking status: Former     Packs/day: 1.00     Years: 20.00     Pack years: 20.00     Types: Cigarettes     Quit date: 1980     Years since quittin.3     Passive exposure: Never    Smokeless tobacco: Never   Substance and Sexual Activity    Alcohol use: No    Drug use: No    Sexual activity: Not Currently     Review of Systems   Constitutional: Positive for decreased appetite. Negative for diaphoresis, fever and malaise/fatigue.   Cardiovascular:  Negative for chest pain, leg swelling and palpitations.   Respiratory:  Positive for shortness of breath. Negative for cough.    All other systems reviewed and are negative.  Objective:     Vital Signs (Most Recent):  Temp: 98.2 °F (36.8 °C) (22 2100)  Pulse: 71 (22 2300)  Resp: (!) 21 (22 2300)  BP: (!) 158/70 (22 2300)  SpO2: 97 % (22)   Vital Signs (24h Range):  Temp:  [98.1 °F (36.7 °C)-98.4 °F (36.9 °C)] 98.2 °F (36.8 °C)  Pulse:  [67-76] 71  Resp:  [16-22] 21  SpO2:  [95 %-100 %] 97 %  BP: (110-174)/(61-71) 158/70     Weight: 78.5 kg (173 lb)  Body mass index is 28.79 kg/m².    SpO2: 97 %       No intake or output data in the 24 hours ending 22 0002    Lines/Drains/Airways       Peripheral Intravenous Line  Duration                  Peripheral IV - Single Lumen 22 18 G Left Forearm <1 day                    Physical Exam  Vitals and nursing note reviewed.   Constitutional:       Appearance: Normal appearance.   HENT:      Head: Normocephalic.   Cardiovascular:      Rate and Rhythm: Normal  rate. Rhythm irregular.      Pulses: Normal pulses.      Heart sounds: Normal heart sounds.   Pulmonary:      Effort: Pulmonary effort is normal. No respiratory distress.      Breath sounds: Normal breath sounds.   Abdominal:      General: There is distension.      Palpations: Abdomen is soft.   Musculoskeletal:      Cervical back: Normal range of motion.      Right lower leg: No edema.      Left lower leg: No edema.   Skin:     General: Skin is warm.   Neurological:      General: No focal deficit present.      Mental Status: He is alert.       Significant Labs: All pertinent lab results from the last 24 hours have been reviewed.        Assessment and Plan:     * Acute decompensated heart failure  82 y.o. male with past medical history of Melanoma, CAD s/p CABG, PCI (11/2021), mechanical aortic valve (2014) on coumadin, HFmrEF and diastolic dysfunction, CVA, CKD III, HTN, discharged yesterday after admission for symptomatic anemia (Hb 6.9), type 2 MI/demand ischemia and received blood products (2-3u). During prior admission Hgb goal was >8 in the setting of ACS however at this time, patient not having chest pain. He is currently having dyspnea which I suspect is in the setting of acute decompensated heart failure after recent admission with multiple transfusions of RBC therefore recommend the following:    · Continue metoprolol succinate 25 mg daily with HR goal of 70-80s  · Cont Atorvastatin 80 mg  · Continue Entresto 24/26 BID   · Would obtain formal echo  · IV Lasix 40mg bid. Will reassess vol status in the morning and make further diuretic adjustments.  · Still recommend outpatient PET Stress Test  · Strict I/Os    H/O mechanical aortic valve replacement  Recurrent epistaxis, unable to tolerate dual antiplatelet therapy. Currently no more bleeding.   ·  continue Warfarin at 5 mg daily despite bleeding risk with goal INR closer to 2 due to risk of valve thrombosis             VTE Risk Mitigation (From admission,  onward)         Ordered     warfarin (COUMADIN) tablet 5 mg  Daily         12/30/22 0009     Reason for No Pharmacological VTE Prophylaxis  Once        Question:  Reasons:  Answer:  Already adequately anticoagulated on oral Anticoagulants    12/30/22 0009     Place sequential compression device  Until discontinued         12/30/22 0009     IP VTE HIGH RISK PATIENT  Once         12/30/22 0009     Place sequential compression device  Until discontinued         12/30/22 0009                Thank you for your consult. I will follow-up with patient. Please contact us if you have any additional questions.    Barbie Zelaya MD  Cardiology   [facility] - Emergency Dept

## 2022-12-30 NOTE — PROGRESS NOTES
Transitional Care Note  Subjective:       Patient ID: Dariel Urbina is a 82 y.o. male.  Chief Complaint: Follow-up    Family and/or Caretaker present at visit?  Yes.  Diagnostic tests reviewed/disposition: I have reviewed all completed as well as pending diagnostic tests at the time of discharge.  Disease/illness education:  Anemia and coronary artery disease  Home health/community services discussion/referrals: Patient does not have home health established from hospital visit.  They do not need home health at the current time because I am sending him back to the hospital today. need home health.  If needed, we will set up home health for the patient.   Establishment or re-establishment of referral orders for community resources: No other necessary community resources.   Discussion with other health care providers:  I did speak with the nurse triage in the emergency room and relayed my concerns in his hospital history.  His wife is going to drive him over to the emergency room .   HPI  Review of Systems    Objective:      Physical Exam    Assessment:       1. Angina at rest    2. Anemia, unspecified type          Plan:        Admit to the hospital to the emergency room to be evaluated for both worsening anemia and angina with very minimal activity.

## 2022-12-30 NOTE — H&P
"[facility] - Emergency Dept  [facility] Medicine  History & Physical    Patient Name: Dariel Urbina  MRN: [id number]  Patient Class: OP- Observation  Admission Date: 12/29/2022  Attending Physician: Maeve Huang MD [facility]  Admitting Physician: Jonathan Rawls MD  Primary Care Provider: Devon Langston MD         Patient information was obtained from patient, past medical records and ER records.     Subjective:     Principal Problem:Dyspnea on exertion    Chief Complaint:   Chief Complaint   Patient presents with    Abnormal Lab     Patient reports he was instructed to come to ER due to "needing a pint of blood". Patient reports he was discharged from the hospital yesterday after being treated for "chest pain and nosebleeds". Patient declines any pain.         HPI: 82 y.o. male with past medical history of Melanoma, CAD s/p CABG, PCI (11/2021), mechanical aortic valve (2014) on coumadin, HFmEF and diastolic dysfunction, CVA, CKD III, HTN,  presented to the ED with dyspnea on exertion.    He was discharged from [facility] yesterday after being admitted from 12/21-12/28 due to recurrent epistaxis causing blood loss anemia requiring transfusion and also with type 2 MI myocardial injury secondary to the anemia, with 2 units PRBCs given.  During his admission troponin peaked at 2.4.  He was seen by ENT and IR consults, received nasal packing without any surgical procedures, cardiology consultation recommended goal hemoglobin greater than 8 in that acute setting.  He was discharged to continue Coumadin INR on admission was supratherapeutic and at discharge was subtherapeutic but bridging anticoagulation contraindicated with his recent bleeding.    Patient reports that while in the hospital he would ambulate within his room and out to the nurses station to get coffee but no further distances states that since discharge he has felt dyspnea with exertion he has no chest pain at rest no chest pressure.  " He has not had any recurrence of epistaxis.  ED workup shows BNP 1 week ago was 300s now is 1188, patient is not on oxygen chest x-ray without any acute edema but concern is that patient has heart failure symptoms emergency department spoke with Cardiology and dosage of IV Lasix was recommended with further cardiac workup.    On my interview patient is urinated once at least 6-700 cc states he feels okay is okay with readmission and workup of potential heart failure also discussed that while he believed transfusion was the next step ruling out acute heart failure before administering transfusion would be important to avoid harm            Past Medical History:   Diagnosis Date    Anemia of chronic renal failure, stage 3 (moderate) 05/27/2015    Anticoagulant long-term use     Atherosclerosis of coronary artery bypass graft of native heart without angina pectoris 09/11/2012    3-27-18 [facility] Two vessel coronary artery disease.   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    Bilateral carotid artery disease 02/09/2017    Bleeding from the nose     Bleeding nose 03/21/2018    Cataract     CKD (chronic kidney disease) stage 3, GFR 30-59 ml/min 05/27/2015    Claudication of left lower extremity 09/17/2014    Colon polyp     Encounter for blood transfusion     Gastroesophageal reflux disease without esophagitis 03/19/2018    Gastrointestinal hemorrhage associated with intestinal diverticulosis 04/01/2018    Glaucoma     H/O mechanical aortic valve replacement 09/17/2014    History of gout 09/26/2012    Hyperparathyroidism due to renal insufficiency 07/27/2015    Internal hemorrhoid 04/03/2018    Long term current use of anticoagulant therapy 09/26/2012    Mechanical heart valve present     Metabolic acidosis with normal anion gap and bicarbonate losses 03/20/2018    Mixed hyperlipidemia 09/26/2012    NSTEMI (non-ST elevated myocardial infarction) 03/21/2018    Obesity, diabetes,  and hypertension syndrome 02/23/2016    PVD (peripheral vascular disease) 09/11/2012    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    Syncope 09/29/2022    Type 2 diabetes mellitus with diabetic peripheral angiopathy without gangrene 05/27/2015    Type 2 diabetes mellitus with stage 3 chronic kidney disease, without long-term current use of insulin 10/02/2013       Past Surgical History:   Procedure Laterality Date    CARDIAC CATHETERIZATION      CARDIAC VALVE REPLACEMENT      CARDIAC VALVE SURGERY      CARPAL TUNNEL RELEASE Right 05/19/2020    Procedure: RELEASE, CARPAL TUNNEL;  Surgeon: Rupesh Norris Jr., MD;  Location: [facility];  Service: Plastics;  Laterality: Right;    CATARACT EXTRACTION Left 11/13/2022        COLON SURGERY      COLONOSCOPY N/A 03/31/2017    Procedure: COLONOSCOPY;  Surgeon: Bruno Raymond MD;  Location: [facility] ENDO (4TH FLR);  Service: Endoscopy;  Laterality: N/A;  Patient's wife requesting date.    COLONOSCOPY N/A 04/03/2018    Procedure: COLONOSCOPY;  Surgeon: Bonifacio Pelletier MD;  Location: [facility] ENDO (2ND FLR);  Service: Endoscopy;  Laterality: N/A;    COLONOSCOPY N/A 08/13/2018    Procedure: COLONOSCOPY;  Surgeon: Kam Barba MD;  Location: [facility] ENDO (2ND FLR);  Service: Endoscopy;  Laterality: N/A;  2nd floor: PA pressure 49; hx of moderate-severe valve disease     per Coumadin clinic-Patient can hold 5 days with lovenox bridge       ok to schedule per Katarina    CORONARY ANGIOGRAPHY N/A 10/04/2021    Procedure: Left heart cath +/- peripheral angiogram;  Surgeon: Jose Ruiz MD;  Location: [facility] CATH LAB;  Service: Cardiology;  Laterality: N/A;    CORONARY ANGIOPLASTY      CORONARY ARTERY BYPASS GRAFT      CORONARY BYPASS GRAFT ANGIOGRAPHY  10/04/2021    Procedure: Bypass graft study;  Surgeon: Jose Ruzi MD;  Location: [facility] CATH LAB;  Service: Cardiology;;    HERNIA REPAIR      INTRAOCULAR PROSTHESES INSERTION Left 11/13/2022    Procedure:  INSERTION, IOL PROSTHESIS;  Surgeon: Alia Mckeon MD;  Location: [address];  Service: Ophthalmology;  Laterality: Left;    INTRAOCULAR PROSTHESES INSERTION Right 12/4/2022    Procedure: INSERTION, IOL PROSTHESIS;  Surgeon: Alia Mckeon MD;  Location: [address];  Service: Ophthalmology;  Laterality: Right;    PHACOEMULSIFICATION OF CATARACT Left 11/13/2022    Procedure: PHACOEMULSIFICATION, CATARACT;  Surgeon: Alia Mckeon MD;  Location: [facility] OR [address] [place] [address];  Service: Ophthalmology;  Laterality: Left;    PHACOEMULSIFICATION OF CATARACT Right 12/4/2022    Procedure: PHACOEMULSIFICATION, CATARACT;  Surgeon: Alia Mckeon MD;  Location: [address];  Service: Ophthalmology;  Laterality: Right;    SPINE SURGERY      VASECTOMY         Review of patient's allergies indicates:   Allergen Reactions    Fosinopril      Intolerance- elevates potassium level      Losartan      Intolerance- elevates potassium level       Current Facility-Administered Medications on File Prior to Encounter   Medication    ondansetron injection 4 mg    ondansetron injection 4 mg    phenylephrine HCL 2.5% ophthalmic solution 1 drop    phenylephrine HCL 2.5% ophthalmic solution 1 drop    proparacaine 0.5 % ophthalmic solution 1 drop    proparacaine 0.5 % ophthalmic solution 1 drop    tropicamide 1% ophthalmic solution 1 drop    tropicamide 1% ophthalmic solution 1 drop     Current Outpatient Medications on File Prior to Encounter   Medication Sig    allopurinoL (ZYLOPRIM) 100 MG tablet TAKE 1 TABLET EVERY DAY    bumetanide (BUMEX) 1 MG tablet Take 1 tablet (1 mg total) by mouth once daily.    ferrous gluconate (FERGON) 324 MG tablet Take 1 tablet (324 mg total) by mouth daily with breakfast.    isosorbide mononitrate (IMDUR) 60 MG 24 hr tablet Take 1 tablet (60 mg total) by mouth once daily.    lisinopriL (PRINIVIL,ZESTRIL) 2.5 MG tablet Take 1 tablet (2.5 mg total) by mouth once daily.     loperamide (IMODIUM) 2 mg capsule Take 2 mg by mouth 4 (four) times daily as needed for Diarrhea.    metoprolol succinate (TOPROL-XL) 25 MG 24 hr tablet Take 0.5 tablets (12.5 mg total) by mouth once daily.    oxymetazoline (AFRIN) 0.05 % nasal spray 2 sprays by Nasal route daily as needed (nose bleed).    prednisolon/gatiflox/bromfenac (PREDNISOL ACE-GATIFLOX-BROMFEN) 1-0.5-0.075 % DrpS Apply 1 drop to eye 3 (three) times daily. One drop 3 times a day in surgical eye    rosuvastatin (CRESTOR) 40 MG Tab TAKE 1 TABLET EVERY EVENING.    warfarin (COUMADIN) 5 MG tablet Take 1 tablet (5 mg total) by mouth Daily.     Family History       Problem Relation (Age of Onset)    Alcohol abuse Father    Diabetes Brother    Heart disease Mother, Father, Brother, Sister    Heart failure Mother, Father, Brother    No Known Problems Sister, Maternal Grandmother, Maternal Grandfather, Paternal Grandmother, Paternal Grandfather, Maternal Aunt, Maternal Uncle, Paternal Aunt, Paternal Uncle          Tobacco Use    Smoking status: Former     Packs/day: 1.00     Years: 20.00     Pack years: 20.00     Types: Cigarettes     Quit date: 1980     Years since quittin.3     Passive exposure: Never    Smokeless tobacco: Never   Substance and Sexual Activity    Alcohol use: No    Drug use: No    Sexual activity: Not Currently     Review of Systems   Constitutional:  Positive for activity change. Negative for appetite change, chills and fever.   HENT:  Negative for trouble swallowing.    Cardiovascular:  Negative for chest pain, palpitations and leg swelling.   Gastrointestinal:  Negative for abdominal pain.   Genitourinary:  Negative for dysuria.   Musculoskeletal:  Negative for back pain.   Neurological:  Positive for weakness.   Psychiatric/Behavioral:  Negative for confusion.    Objective:     Vital Signs (Most Recent):  Temp: 98.2 °F (36.8 °C) (22 2100)  Pulse: 71 (220)  Resp: (!) 21 (22)  BP: (!)  158/70 (22 2300)  SpO2: 97 % (22 2300)   Vital Signs (24h Range):  Temp:  [98.1 °F (36.7 °C)-98.4 °F (36.9 °C)] 98.2 °F (36.8 °C)  Pulse:  [67-76] 71  Resp:  [16-22] 21  SpO2:  [95 %-100 %] 97 %  BP: (110-174)/(61-71) 158/70     Weight: 78.5 kg (173 lb)  Body mass index is 28.79 kg/m².    Physical Exam  Constitutional:       Appearance: He is ill-appearing.   Neck:      Comments: JVD with abdominal jugular reflux  Cardiovascular:      Rate and Rhythm: Regular rhythm.      Heart sounds: S1 normal.      Comments: S2-mechanical click  Pulmonary:      Effort: No tachypnea or accessory muscle usage.      Breath sounds: Normal breath sounds.   Musculoskeletal:      Right lower leg: No edema.      Left lower le+ Pitting Edema present.   Skin:     General: Skin is warm and dry.      Comments: Scalp lesion   Neurological:      General: No focal deficit present.      Mental Status: He is alert and oriented to person, place, and time.   Psychiatric:         Mood and Affect: Mood normal.         Behavior: Behavior normal.           Significant Labs: All pertinent labs within the past 24 hours have been reviewed.  CBC:   Recent Labs   Lab 22   WBC 6.08   HGB 7.6*   HCT 24.2*   *     CMP:   Recent Labs   Lab 22      K 5.3*   *   CO2 20*   GLU 89   BUN 40*   CREATININE 1.8*   CALCIUM 10.0   PROT 6.6   ALBUMIN 3.3*   BILITOT 0.5   ALKPHOS 68   AST 15   ALT 11   ANIONGAP 4*     Cardiac Markers:   Recent Labs   Lab 22   BNP 1,188*     Magnesium: No results for input(s): MG in the last 48 hours.  Troponin:   Recent Labs   Lab 22   TROPONINI 0.164*       Significant Imaging: I have reviewed all pertinent imaging results/findings within the past 24 hours.    XR CHEST AP PORTABLE     CLINICAL HISTORY:  chest pain;     TECHNIQUE:  Single frontal view of the chest was performed.     COMPARISON:  Chest radiograph 2022     FINDINGS:  Monitoring leads  overlie the chest.  No detrimental change.     Sternotomy wires and previous cardiac valve repair with scattered mediastinal clips appear stable.  Cardiomediastinal silhouette is midline and stable without evidence of failure.  Similar nonspecific elevation of the right hemidiaphragm.     Suspected skin fold overlies the lateral right upper lung zone.  Lungs are well expanded and clear.  No pleural effusion or pneumothorax.     No acute osseous process seen.  PA and lateral views can be obtained.     Impression:     No detrimental change or radiographic acute intrathoracic process seen on this single view.        Electronically signed by: Delano Taveras MD  Date:                                            12/29/2022  Time:                                           19:50    Assessment/Plan:     * Dyspnea on exertion  Concern for acute on chronic diastolic heart failure exacerbation is the etiology  -also could be secondary to symptomatic anemia  -given that giving transfusion now if he was in heart failure which could exacerbate symptoms well hold any blood transfusions send a type and screen in the morning  -s/p 40 mg mg IV Lasix in the ED, approximately 600 cc urine output so far monitor final output in the morning and will give another 60 mg IV Lasix daily for next 2 days, (titrate as needed)  -repeat echocardiogram  Monitor CBC and if symptoms do not improve with diuresis and echo does not demonstrate acute congestion then consider administering 1 unit PRBCs  -also order PT OT      Anemia  As per primary problem      On warfarin therapy  Give 1 time dose of warfarin tonight and continue 5 mg p.o. daily  -PharmD consult to assist with warfarin management and daily PT INR      Renovascular hypertension  Continue Toprol-XL, Imdur, lisinopril      Stage 3b chronic kidney disease  Renal function improving since recent discharge      Type 2 diabetes mellitus with diabetic peripheral angiopathy without gangrene  Patient's  FSGs are controlled on current medication regimen.  Last A1c reviewed-   Lab Results   Component Value Date    HGBA1C 5.4 10/03/2022     Most recent fingerstick glucose reviewed- No results for input(s): POCTGLUCOSE in the last 24 hours.  Current correctional scale  Low  Maintain anti-hyperglycemic dose as follows-   Antihyperglycemics (From admission, onward)    Start     Stop Route Frequency Ordered    12/30/22 0107  insulin aspart U-100 pen 0-5 Units         -- SubQ Before meals & nightly PRN 12/30/22 0009        Hold Oral hypoglycemics while patient is in the hospital.      VTE Risk Mitigation (From admission, onward)         Ordered     warfarin (COUMADIN) tablet 5 mg  Daily         12/30/22 0009     warfarin (COUMADIN) tablet 5 mg  Once         12/30/22 0034     Reason for No Pharmacological VTE Prophylaxis  Once        Question:  Reasons:  Answer:  Already adequately anticoagulated on oral Anticoagulants    12/30/22 0009     Place sequential compression device  Until discontinued         12/30/22 0009     IP VTE HIGH RISK PATIENT  Once         12/30/22 0009     Place sequential compression device  Until discontinued         12/30/22 0009                   Jonathan Rawls MD  Department of Hospital Medicine   [facility] - Emergency Dept

## 2022-12-30 NOTE — PLAN OF CARE
Problem: Physical Therapy  Goal: Physical Therapy Goal  Description: PT evaluation complete - no acute needs at this time  Outcome: Met

## 2022-12-30 NOTE — ASSESSMENT & PLAN NOTE
Recurrent epistaxis, unable to tolerate dual antiplatelet therapy. Currently no more bleeding.   ·  continue Warfarin at 5 mg daily despite bleeding risk with goal INR closer to 2 due to risk of valve thrombosis

## 2022-12-30 NOTE — HPI
82 y.o. male with past medical history of Melanoma, CAD s/p CABG, PCI (11/2021), mechanical aortic valve (2014) on coumadin, HFmEF and diastolic dysfunction, CVA, CKD III, HTN, discharged yesterday after admission for symptomatic anemia (Hb 6.9), type 2 MI/demand ischemia and received blood products (2-3u) with resolution of chest pain. Anemia was thought to be from long-standing history of recurrent epistaxis, more so in colder climate and has had several cauterizations with ENT. He was found to have supratherapeutic INR and his warfarin was held; however restarted at discharge without bridging as he had just recovered from significant epistaxis. He was also managed for NSTEMI given CP andTrop elevated to 2. Concern for ACS at the time although picture complicated by epistaxis therefore he was optimized medically as tolerated with Toprol XL 25MG daily, Entresto 24/26 BID, astatin 80 and advised to have outpatient PET stress test for ischemic evaluation.      Today he presents with complaints of worsening shortness of breath at rest and with minimal exertion. He reports a decrease in appetitie. No evidence of bleeding. No recurrence of epistaxis. He is on Bumex 1 mg daily. CXR on arrival clear bilaterally. Bedside echo done which showed EF approx 40-45% and inferolateral WMA; IVC 1.96cm and >50% collapsible suggestive of CVP 8. Troponin is downtrending from prior admission 0.164, bnp elevated at 1188. ECG with no evidence of acute ischemia, AF, rate controlled.

## 2022-12-30 NOTE — ASSESSMENT & PLAN NOTE
Give 1 time dose of warfarin tonight and continue 5 mg p.o. daily  -PharmD consult to assist with warfarin management and daily PT INR

## 2022-12-30 NOTE — PROGRESS NOTES
PHARMACY CONSULT NOTE: WARFARIN  Dariel Urbina is a 82 y.o. male on warfarin therapy for mechanical aortic heart valve.PharmD has been consulted for warfarin dosing.    Current order: 5 mg QD  Home dose: 5 mg QD  Coumadin clinic enrollment: Active  INR goal: 2-3    Lab Results   Component Value Date    INR 1.2 12/30/2022    INR 1.2 12/29/2022    INR 1.1 12/29/2022     Significant drug interactions:None  Diet: Adult Cardiac     Recommendation(s):   Continue with warfarin 5 mg QD. Given INR-1.2 and presence of mechanical aortic valve will recommend low dose heparin gtt bridge until therapeutic INR range achieved.   Daily INR- ordered. Hgb, Hct, Plt- stable.   Pharmacy will continue to follow and monitor warfarin    Thank you for the consult,  Radha Cottrell  Extension [phone]     **Note: Consults are reviewed Monday-Friday 7:00am-3:30pm. THE ABOVE RECOMMENDATIONS ARE ONLY SUGGESTED.THE RECOMMENDATIONS SHOULD BE CONSIDERED IN CONJUNCTION WITH ALL PATIENT FACTORS. **

## 2022-12-30 NOTE — ED PROVIDER NOTES
"Encounter Date: 12/29/2022       History     Chief Complaint   Patient presents with    Abnormal Lab     Patient reports he was instructed to come to ER due to "needing a pint of blood". Patient reports he was discharged from the hospital yesterday after being treated for "chest pain and nosebleeds". Patient declines any pain.      HPI  Patient is an 82-year-old male with history of melanoma, CAD status post CABG, mechanical aortic valve on warfarin, CHF, prior CVA, CKD stage 3, hypertension who presents from his primary care clinic for anemia.  Patient was just discharged yesterday after an inpatient stay for anemia requiring blood transfusion, chest pain and NSTEMI.  It was thought that his chest pain was due to demand ischemia and improved after receiving blood products.  He also has history of recurrent epistaxis which was one of the causes for his anemia during his previous admission but has not had any epistaxis since discharge yesterday.  He states that his hemoglobin was low today at his outpatient clinic appointment.  Since his discharge, he has noticed that he has chest pain and shortness of breath but only with exertion.  Describes the chest pain as an indigestion type burning pain.  His symptoms resolve with rest.  He does state that while he was inpatient he did have black tarry stools but they attributed this to his epistaxis.  Denies melena, hematochezia, hematemesis, epistaxis since his discharge home.    Review of patient's allergies indicates:   Allergen Reactions    Fosinopril      Intolerance- elevates potassium level      Losartan      Intolerance- elevates potassium level     Past Medical History:   Diagnosis Date    Anemia of chronic renal failure, stage 3 (moderate) 05/27/2015    Anticoagulant long-term use     Atherosclerosis of coronary artery bypass graft of native heart without angina pectoris 09/11/2012    3-27-18 [facility] Two vessel coronary artery disease.   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    Bilateral carotid artery disease 02/09/2017    Bleeding from the nose     Bleeding nose 03/21/2018    Cataract     CKD (chronic kidney disease) stage 3, GFR 30-59 ml/min 05/27/2015    Claudication of left lower extremity 09/17/2014    Colon polyp     Encounter for blood transfusion     Gastroesophageal reflux disease without esophagitis 03/19/2018    Gastrointestinal hemorrhage associated with intestinal diverticulosis 04/01/2018    Glaucoma     H/O mechanical aortic valve replacement 09/17/2014    History of gout 09/26/2012    Hyperparathyroidism due to renal insufficiency 07/27/2015    Internal hemorrhoid 04/03/2018    Long term current use of anticoagulant therapy 09/26/2012    Mechanical heart valve present     Metabolic acidosis with normal anion gap and bicarbonate losses 03/20/2018    Mixed hyperlipidemia 09/26/2012    NSTEMI (non-ST elevated myocardial infarction) 03/21/2018    Obesity, diabetes, and hypertension syndrome 02/23/2016    PVD (peripheral vascular disease) 09/11/2012    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    Syncope 09/29/2022    Type 2 diabetes mellitus with diabetic peripheral angiopathy without gangrene 05/27/2015    Type 2 diabetes mellitus with stage 3 chronic kidney disease, without long-term current use of insulin 10/02/2013     Past Surgical History:   Procedure Laterality Date    CARDIAC CATHETERIZATION      CARDIAC VALVE REPLACEMENT      CARDIAC VALVE SURGERY      CARPAL TUNNEL RELEASE Right 05/19/2020    Procedure: RELEASE, CARPAL TUNNEL;  Surgeon: Rupesh Norris Jr., MD;  Location: [facility];  Service: Plastics;  Laterality: Right;    CATARACT EXTRACTION Left 11/13/2022        COLON SURGERY      COLONOSCOPY N/A 03/31/2017    Procedure: COLONOSCOPY;  Surgeon: Bruno Raymond MD;  Location: [address]);  Service: Endoscopy;  Laterality: N/A;  Patient's wife requesting date.    COLONOSCOPY N/A 04/03/2018    Procedure:  COLONOSCOPY;  Surgeon: Bonifacio Pelletier MD;  Location: [facility] ENDO (2ND FLR);  Service: Endoscopy;  Laterality: N/A;    COLONOSCOPY N/A 08/13/2018    Procedure: COLONOSCOPY;  Surgeon: Kam Barba MD;  Location: [facility] ENDO (2ND FLR);  Service: Endoscopy;  Laterality: N/A;  2nd floor: PA pressure 49; hx of moderate-severe valve disease     per Coumadin clinic-Patient can hold 5 days with lovenox bridge       ok to schedule per Katarina    CORONARY ANGIOGRAPHY N/A 10/04/2021    Procedure: Left heart cath +/- peripheral angiogram;  Surgeon: Jose Ruiz MD;  Location: [facility] CATH LAB;  Service: Cardiology;  Laterality: N/A;    CORONARY ANGIOPLASTY      CORONARY ARTERY BYPASS GRAFT      CORONARY BYPASS GRAFT ANGIOGRAPHY  10/04/2021    Procedure: Bypass graft study;  Surgeon: Jose Ruiz MD;  Location: [facility] CATH LAB;  Service: Cardiology;;    HERNIA REPAIR      INTRAOCULAR PROSTHESES INSERTION Left 11/13/2022    Procedure: INSERTION, IOL PROSTHESIS;  Surgeon: Alia Mckeon MD;  Location: [facility] OR [address] [place] [address];  Service: Ophthalmology;  Laterality: Left;    INTRAOCULAR PROSTHESES INSERTION Right 12/4/2022    Procedure: INSERTION, IOL PROSTHESIS;  Surgeon: Alia Mckeon MD;  Location: [facility] OR [address] [place] [address];  Service: Ophthalmology;  Laterality: Right;    PHACOEMULSIFICATION OF CATARACT Left 11/13/2022    Procedure: PHACOEMULSIFICATION, CATARACT;  Surgeon: Alia Mckeon MD;  Location: [facility] OR [address] [place] [address];  Service: Ophthalmology;  Laterality: Left;    PHACOEMULSIFICATION OF CATARACT Right 12/4/2022    Procedure: PHACOEMULSIFICATION, CATARACT;  Surgeon: Alia Mckeon MD;  Location: [facility] OR [address] [place] [address];  Service: Ophthalmology;  Laterality: Right;    SPINE SURGERY      VASECTOMY       Family History   Problem Relation Age of Onset    Heart failure Mother     Heart disease Mother     Heart failure Father     Heart disease Father     Alcohol abuse Father     Heart failure Brother     Heart disease Brother     Diabetes Brother      No Known Problems Sister     No Known Problems Maternal Grandmother     No Known Problems Maternal Grandfather     No Known Problems Paternal Grandmother     No Known Problems Paternal Grandfather     Heart disease Sister     No Known Problems Maternal Aunt     No Known Problems Maternal Uncle     No Known Problems Paternal Aunt     No Known Problems Paternal Uncle     Amblyopia Neg Hx     Blindness Neg Hx     Cancer Neg Hx     Cataracts Neg Hx     Glaucoma Neg Hx     Hypertension Neg Hx     Macular degeneration Neg Hx     Retinal detachment Neg Hx     Strabismus Neg Hx     Stroke Neg Hx     Thyroid disease Neg Hx     Anemia Neg Hx     Arrhythmia Neg Hx     Asthma Neg Hx     Clotting disorder Neg Hx     Fainting Neg Hx     Heart attack Neg Hx     Hyperlipidemia Neg Hx     Atrial Septal Defect Neg Hx     Melanoma Neg Hx      Social History     Tobacco Use    Smoking status: Former     Packs/day: 1.00     Years: 20.00     Pack years: 20.00     Types: Cigarettes     Quit date: 1980     Years since quittin.3     Passive exposure: Never    Smokeless tobacco: Never   Substance Use Topics    Alcohol use: No    Drug use: No     Review of Systems  Constitutional: No fever, no chills  HENT: No sore throat  Eyes: No eye pain  Respiratory: + shortness of breath with exertion  Cardiovascular: + chest pain with exertion  Gastrointestinal: No abdominal pain, no vomiting, no diarrhea, no melena, no hematochezia, no hematemesis  Genitourinary: No dysuria, no hematuria  Musculoskeletal: No back pain  Neurological: No headache  Psychiatric: No agitation     Physical Exam     Initial Vitals [22 1508]   BP Pulse Resp Temp SpO2   (!) 146/61 72 17 98.4 °F (36.9 °C) 95 %      MAP       --         Physical Exam  Constitutional: No acute distress, well appearing  Respiratory: Non-labored, lungs clear bilaterally  Cardiovascular: Well perfused, normal rate, regular rhythm, mechanical valve click auscultated on  exam  Gastrointestinal: Soft, non-tender, non-distended  Integumentary: Warm and dry  Musculoskeletal: No deformity  Neurological: Awake and alert, follows commands in all extremities  Psychiatric: Cooperative     ED Course   Procedures  Labs Reviewed   CBC W/ AUTO DIFFERENTIAL - Abnormal; Notable for the following components:       Result Value    RBC 2.42 (*)     Hemoglobin 7.6 (*)     Hematocrit 24.2 (*)      (*)     MCH 31.4 (*)     MCHC 31.4 (*)     RDW 16.0 (*)     Platelets 149 (*)     All other components within normal limits   COMPREHENSIVE METABOLIC PANEL - Abnormal; Notable for the following components:    Potassium 5.3 (*)     Chloride 116 (*)     CO2 20 (*)     BUN 40 (*)     Creatinine 1.8 (*)     Albumin 3.3 (*)     Anion Gap 4 (*)     eGFR 37.3 (*)     All other components within normal limits   TROPONIN I - Abnormal; Notable for the following components:    Troponin I 0.164 (*)     All other components within normal limits   B-TYPE NATRIURETIC PEPTIDE - Abnormal; Notable for the following components:    BNP 1,188 (*)     All other components within normal limits   D DIMER, QUANTITATIVE - Abnormal; Notable for the following components:    D-Dimer 1.66 (*)     All other components within normal limits   PROTIME-INR          Imaging Results              X-Ray Chest AP Portable (Final result)  Result time 12/29/22 19:50:43      Final result by Delano Taveras MD (12/29/22 19:50:43)                   Impression:      No detrimental change or radiographic acute intrathoracic process seen on this single view.      Electronically signed by: Delano Taveras MD  Date:    12/29/2022  Time:    19:50               Narrative:    EXAMINATION:  XR CHEST AP PORTABLE    CLINICAL HISTORY:  chest pain;    TECHNIQUE:  Single frontal view of the chest was performed.    COMPARISON:  Chest radiograph 12/21/2022    FINDINGS:  Monitoring leads overlie the chest.  No detrimental change.    Sternotomy wires and previous  cardiac valve repair with scattered mediastinal clips appear stable.  Cardiomediastinal silhouette is midline and stable without evidence of failure.  Similar nonspecific elevation of the right hemidiaphragm.    Suspected skin fold overlies the lateral right upper lung zone.  Lungs are well expanded and clear.  No pleural effusion or pneumothorax.    No acute osseous process seen.  PA and lateral views can be obtained.                                       Medications   prednisol ace-gatiflox-bromfen 1-0.5-0.075 % DrpS 1 drop (has no administration in time range)   allopurinoL tablet 100 mg (has no administration in time range)   ferrous gluconate tablet 324 mg (has no administration in time range)   isosorbide mononitrate 24 hr tablet 60 mg (has no administration in time range)   lisinopriL tablet 2.5 mg (has no administration in time range)   metoprolol succinate (TOPROL-XL) 24 hr split tablet 12.5 mg (has no administration in time range)   oxymetazoline 0.05 % nasal spray 2 spray (has no administration in time range)   warfarin (COUMADIN) tablet 5 mg (has no administration in time range)   atorvastatin tablet 80 mg (has no administration in time range)   acetaminophen tablet 650 mg (has no administration in time range)   albuterol-ipratropium 2.5 mg-0.5 mg/3 mL nebulizer solution 3 mL (has no administration in time range)   ALPRAZolam tablet 0.25 mg (has no administration in time range)   aluminum-magnesium hydroxide-simethicone 200-200-20 mg/5 mL suspension 30 mL (has no administration in time range)   dextrose 10% bolus 125 mL 125 mL (has no administration in time range)   dextrose 10% bolus 250 mL 250 mL (has no administration in time range)   glucagon (human recombinant) injection 1 mg (has no administration in time range)   glucose chewable tablet 16 g (has no administration in time range)   glucose chewable tablet 24 g (has no administration in time range)   loperamide capsule 2 mg (has no administration in  time range)   naloxone 0.4 mg/mL injection 0.02 mg (has no administration in time range)   prochlorperazine injection Soln 5 mg (has no administration in time range)   ondansetron injection 4 mg (has no administration in time range)   sodium chloride 0.9% flush 10 mL (has no administration in time range)   melatonin tablet 6 mg (has no administration in time range)   acetaminophen tablet 650 mg (has no administration in time range)   naloxone 0.4 mg/mL injection 0.02 mg (has no administration in time range)   insulin aspart U-100 pen 0-5 Units (has no administration in time range)   furosemide injection 40 mg (40 mg Intravenous Given 12/29/22 4099)     Medical Decision Making:   History:   Old Records Summarized: records from clinic visits and records from previous admission(s).  Clinical Tests:   Lab Tests: Ordered and Reviewed  Radiological Study: Ordered and Reviewed  Medical Tests: Ordered and Reviewed  ED Management:  Patient is an 82-year-old male who presents for shortness of breath and chest discomfort with exertion.  Improved with rest.  Was recently admitted for similar and it was thought that his symptoms were due to symptomatic anemia.  Received blood transfusions during his most recent hospitalization and was discharged yesterday.  Upon follow up with his primary care doctor, patient still having symptoms so he was sent to the emergency department.  He was told that his hemoglobin was low and that he might need a transfusion.  In the emergency department, his hemoglobin is 7.6 which is improved since his discharge yesterday.  He has not had any recurrent epistaxis and his stool is negative for blood.  He does continue to have dyspnea with exertion as well as some chest discomfort.  His BNP is significantly elevated.  His troponin is also elevated however is down trending since his admission.  I discussed with cardiology.  They recommend giving Lasix and were able to come down and do a bedside echo.   They recommend admitting to Hospital Medicine for diuresis and formal echo.  After discussion with Hospital Medicine, I also ordered a dimer which hospital Medicine will follow-up with.  Patient and his wife at bedside agreeable to admission.  They were updated on the plan.  Patient stable at time of admission to Hospital Medicine.           ED Course as of 12/30/22 0011   u Dec 29, 2022   1929 EKG with rate controlled AFib with PVCs, rate 77, no STEMI, morphology similar to prior [NN]   1952 Hemoccult negative [NN]   1952 RADHA with normal rectal tone, minimal amount of light brown stool, no gross melena or hematochezia, no palpable internal hemorrhoids, no visible external hemorrhoids [NN]      ED Course User Index  [NN] Yani Jack MD                 Clinical Impression:   Final diagnoses:  [R06.02] Shortness of breath  [R07.9] Chest pain        ED Disposition Condition    Observation                 Yani Jack MD  12/30/22 0015

## 2022-12-30 NOTE — PT/OT/SLP EVAL
"Physical Therapy Evaluation & Discharge    Patient Name:  Dariel Urbina   MRN:  [id number]  Admit Date: 12/29/2022  Admitting Diagnosis:  Dyspnea on exertion  Length of Stay: 0 days  Recent Surgery: * No surgery found *      Recommendations:     Discharge Recommendations:  home   Discharge Equipment Recommendations: none   Barriers to discharge: None    Assessment:     Dariel Urbina is a 82 y.o. male admitted with a medical diagnosis of Dyspnea on exertion. Medical history includes heart failure and stroke. Today he was able to perform all functional mobility without assistance. At this time, patient is at their functional baseline and acute PT services are not indicated. PT will discontinue orders. See detailed evaluation below:    Problem List:  none noted    Plan:     Discharge PT orders. Please continue to encourage patient mobility.    Subjective   Communicated with RN prior to session.  Patient found HOB elevated upon PT entry to room, agreeable to evaluation. "I'm doing ok"    Chief Complaint: Abnormal Lab (Patient reports he was instructed to come to ER due to "needing a pint of blood". Patient reports he was discharged from the hospital yesterday after being treated for "chest pain and nosebleeds". Patient declines any pain. )    Patient/Family Comments/goals: to go home  Pain/Comfort:  Pain Rating 1: 0/10  Pain Rating Post-Intervention 1: 0/10    Living Environment:  Patient lives with his wife in a single story home with no steps to enter.     Prior Level of Function:   Patient reports being independent with mobility & with ADLs. Patient uses DME as follows: none.     Roles/Repsonsibilities:   Work: Retired. Drive: yes. Managing Medicines/Managing Home: yes.     Patient reports they will have assistance from his wife upon discharge.    Objective:   Patient found with: peripheral IV     General Precautions: Standard, fall   Orthopedic Precautions:N/A   Braces: N/A   Oxygen Device: Room " "Air  Vitals: BP (!) 114/55 (BP Location: Left arm, Patient Position: Lying)   Pulse 74   Temp 97 °F (36.1 °C) (Oral)   Resp 16   Ht 5' 5" (1.651 m)   Wt 78.5 kg (173 lb)   SpO2 97%   BMI 28.79 kg/m²     Exams:  Cognition:   Alert and Cooperative  AxOx4  Command following: Follows multistep  commands  Fluency: clear/fluent  Hearing: Intact  Vision:  Intact visual fields  Skin Integrity: intact  Sensation: intact  Coordination: intact  LE Strength:  L Lower Extremity: WFL  R Lower Extremity: WFL  LE ROM:  L Lower Extremity: WFL  R Lower Extremity: WFL      Outcome Measures:  AM-PAC 6 CLICK MOBILITY  Turning over in bed (including adjusting bedclothes, sheets and blankets)?: 4  Sitting down on and standing up from a chair with arms (e.g., wheelchair, bedside commode, etc.): 4  Moving from lying on back to sitting on the side of the bed?: 4  Moving to and from a bed to a chair (including a wheelchair)?: 4  Need to walk in hospital room?: 4  Climbing 3-5 steps with a railing?: 4  Basic Mobility Total Score: 24     Functional Mobility:    Bed Mobility:  Supine to Sit: supervision  Scooting anteriorly to EOB to have both feet planted on floor: supervision  Sit to Supine: supervision    Sitting Balance at Edge of Bed:  Assistance Level Required: Supervision    Transfers:   Sit <> Stand Transfer: supervision with no assistive device   Standing Tolerance: supervision with no assistive device   Bed <> Chair Transfer: Step Transfer technique with supervision with no assistive device    Gait:   Patient ambulated: multiple laps in room   Patient required: supervision  Patient used: no assistive device  Gait Deviation(s): none noted    Education:   Patient was educated on continued safe mobility throughout this admission.       Patient left HOB elevated with all lines intact, call button in reach, and RN notified.    GOALS:   Multidisciplinary Problems       Physical Therapy Goals       Not on file              " Multidisciplinary Problems (Resolved)          Problem: Physical Therapy    Goal Priority Disciplines Outcome Goal Variances Interventions   Physical Therapy Goal   (Resolved)     PT, PT/OT Met     Description: PT evaluation complete - no acute needs at this time                       History:     Past Medical History:   Diagnosis Date    Anemia of chronic renal failure, stage 3 (moderate) 05/27/2015    Anticoagulant long-term use     Atherosclerosis of coronary artery bypass graft of native heart without angina pectoris 09/11/2012    3-27-18 [facility] Two vessel coronary artery disease.   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    Bilateral carotid artery disease 02/09/2017    Bleeding from the nose     Bleeding nose 03/21/2018    Cataract     CKD (chronic kidney disease) stage 3, GFR 30-59 ml/min 05/27/2015    Claudication of left lower extremity 09/17/2014    Colon polyp     Encounter for blood transfusion     Gastroesophageal reflux disease without esophagitis 03/19/2018    Gastrointestinal hemorrhage associated with intestinal diverticulosis 04/01/2018    Glaucoma     H/O mechanical aortic valve replacement 09/17/2014    History of gout 09/26/2012    Hyperparathyroidism due to renal insufficiency 07/27/2015    Internal hemorrhoid 04/03/2018    Long term current use of anticoagulant therapy 09/26/2012    Mechanical heart valve present     Metabolic acidosis with normal anion gap and bicarbonate losses 03/20/2018    Mixed hyperlipidemia 09/26/2012    NSTEMI (non-ST elevated myocardial infarction) 03/21/2018    Obesity, diabetes, and hypertension syndrome 02/23/2016    PVD (peripheral vascular disease) 09/11/2012    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    Syncope 09/29/2022    Type 2 diabetes mellitus with diabetic peripheral angiopathy without gangrene 05/27/2015    Type 2 diabetes mellitus with stage 3 chronic kidney disease, without long-term current use of insulin 10/02/2013       Past  Surgical History:   Procedure Laterality Date    CARDIAC CATHETERIZATION      CARDIAC VALVE REPLACEMENT      CARDIAC VALVE SURGERY      CARPAL TUNNEL RELEASE Right 05/19/2020    Procedure: RELEASE, CARPAL TUNNEL;  Surgeon: Rupesh Norris Jr., MD;  Location[facility];  Service: Plastics;  Laterality: Right;    CATARACT EXTRACTION Left 11/13/2022        COLON SURGERY      COLONOSCOPY N/A 03/31/2017    Procedure: COLONOSCOPY;  Surgeon: Bruno Raymond MD;  Location: [facility] (4TH FLR);  Service: Endoscopy;  Laterality: N/A;  Patient's wife requesting date.    COLONOSCOPY N/A 04/03/2018    Procedure: COLONOSCOPY;  Surgeon: Bonifacio Pelletier MD;  Location: [facility] ENDO (2ND FLR);  Service: Endoscopy;  Laterality: N/A;    COLONOSCOPY N/A 08/13/2018    Procedure: COLONOSCOPY;  Surgeon: Kam Barba MD;  Location: [facility] ENDO (2ND FLR);  Service: Endoscopy;  Laterality: N/A;  2nd floor: PA pressure 49; hx of moderate-severe valve disease     per Coumadin clinic-Patient can hold 5 days with lovenox bridge       ok to schedule per Katarina    CORONARY ANGIOGRAPHY N/A 10/04/2021    Procedure: Left heart cath +/- peripheral angiogram;  Surgeon: Jose Ruiz MD;  Location: [facility] CATH LAB;  Service: Cardiology;  Laterality: N/A;    CORONARY ANGIOPLASTY      CORONARY ARTERY BYPASS GRAFT      CORONARY BYPASS GRAFT ANGIOGRAPHY  10/04/2021    Procedure: Bypass graft study;  Surgeon: Jose Ruiz MD;  Location: [facility] CATH LAB;  Service: Cardiology;;    HERNIA REPAIR      INTRAOCULAR PROSTHESES INSERTION Left 11/13/2022    Procedure: INSERTION, IOL PROSTHESIS;  Surgeon: Alia Mckeon MD;  Location: [address];  Service: Ophthalmology;  Laterality: Left;    INTRAOCULAR PROSTHESES INSERTION Right 12/4/2022    Procedure: INSERTION, IOL PROSTHESIS;  Surgeon: Alia Mckeon MD;  Location: [address];  Service: Ophthalmology;  Laterality: Right;    PHACOEMULSIFICATION OF CATARACT Left 11/13/2022    Procedure:  PHACOEMULSIFICATION, CATARACT;  Surgeon: Alia Mckeon MD;  Location: [facility] OR [address] [place] [address];  Service: Ophthalmology;  Laterality: Left;    PHACOEMULSIFICATION OF CATARACT Right 12/4/2022    Procedure: PHACOEMULSIFICATION, CATARACT;  Surgeon: Alia Mckeon MD;  Location: [facility] OR [address] [place] [address];  Service: Ophthalmology;  Laterality: Right;    SPINE SURGERY      VASECTOMY         Time Tracking:     PT Received On: 12/30/22  PT Start Time: 1332     PT Stop Time: 1340  PT Total Time (min): 8 min     Billable Minutes: Evaluation 8    Amanda Jones PT, DPT  12/30/2022

## 2022-12-30 NOTE — SUBJECTIVE & OBJECTIVE
Past Medical History:   Diagnosis Date    Anemia of chronic renal failure, stage 3 (moderate) 05/27/2015    Anticoagulant long-term use     Atherosclerosis of coronary artery bypass graft of native heart without angina pectoris 09/11/2012    3-27-18 [facility] Two vessel coronary artery disease.   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    Bilateral carotid artery disease 02/09/2017    Bleeding from the nose     Bleeding nose 03/21/2018    Cataract     CKD (chronic kidney disease) stage 3, GFR 30-59 ml/min 05/27/2015    Claudication of left lower extremity 09/17/2014    Colon polyp     Encounter for blood transfusion     Gastroesophageal reflux disease without esophagitis 03/19/2018    Gastrointestinal hemorrhage associated with intestinal diverticulosis 04/01/2018    Glaucoma     H/O mechanical aortic valve replacement 09/17/2014    History of gout 09/26/2012    Hyperparathyroidism due to renal insufficiency 07/27/2015    Internal hemorrhoid 04/03/2018    Long term current use of anticoagulant therapy 09/26/2012    Mechanical heart valve present     Metabolic acidosis with normal anion gap and bicarbonate losses 03/20/2018    Mixed hyperlipidemia 09/26/2012    NSTEMI (non-ST elevated myocardial infarction) 03/21/2018    Obesity, diabetes, and hypertension syndrome 02/23/2016    PVD (peripheral vascular disease) 09/11/2012    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    Syncope 09/29/2022    Type 2 diabetes mellitus with diabetic peripheral angiopathy without gangrene 05/27/2015    Type 2 diabetes mellitus with stage 3 chronic kidney disease, without long-term current use of insulin 10/02/2013       Past Surgical History:   Procedure Laterality Date    CARDIAC CATHETERIZATION      CARDIAC VALVE REPLACEMENT      CARDIAC VALVE SURGERY      CARPAL TUNNEL RELEASE Right 05/19/2020    Procedure: RELEASE, CARPAL TUNNEL;  Surgeon: Rupesh Norris Jr., MD;  Location: [facility];  Service: Plastics;  Laterality:  Right;    CATARACT EXTRACTION Left 11/13/2022        COLON SURGERY      COLONOSCOPY N/A 03/31/2017    Procedure: COLONOSCOPY;  Surgeon: Bruno Raymond MD;  Location: [facility] (4TH FLR);  Service: Endoscopy;  Laterality: N/A;  Patient's wife requesting date.    COLONOSCOPY N/A 04/03/2018    Procedure: COLONOSCOPY;  Surgeon: Bonifacio Pelletier MD;  Location: [facility] ENDO (2ND FLR);  Service: Endoscopy;  Laterality: N/A;    COLONOSCOPY N/A 08/13/2018    Procedure: COLONOSCOPY;  Surgeon: Kam Barba MD;  Location: [facility] ENDO (2ND FLR);  Service: Endoscopy;  Laterality: N/A;  2nd floor: PA pressure 49; hx of moderate-severe valve disease     per Coumadin clinic-Patient can hold 5 days with lovenox bridge       ok to schedule per Katarina    CORONARY ANGIOGRAPHY N/A 10/04/2021    Procedure: Left heart cath +/- peripheral angiogram;  Surgeon: Jose Ruiz MD;  Location: [facility] CATH LAB;  Service: Cardiology;  Laterality: N/A;    CORONARY ANGIOPLASTY      CORONARY ARTERY BYPASS GRAFT      CORONARY BYPASS GRAFT ANGIOGRAPHY  10/04/2021    Procedure: Bypass graft study;  Surgeon: Jose Ruiz MD;  Location: [facility] CATH LAB;  Service: Cardiology;;    HERNIA REPAIR      INTRAOCULAR PROSTHESES INSERTION Left 11/13/2022    Procedure: INSERTION, IOL PROSTHESIS;  Surgeon: Alia Mckeon MD;  Location: [facility] OR [address] [place] [address];  Service: Ophthalmology;  Laterality: Left;    INTRAOCULAR PROSTHESES INSERTION Right 12/4/2022    Procedure: INSERTION, IOL PROSTHESIS;  Surgeon: Alia Mckeon MD;  Location: [address];  Service: Ophthalmology;  Laterality: Right;    PHACOEMULSIFICATION OF CATARACT Left 11/13/2022    Procedure: PHACOEMULSIFICATION, CATARACT;  Surgeon: Alia Mckeon MD;  Location: [facility] OR [address] [place] [address];  Service: Ophthalmology;  Laterality: Left;    PHACOEMULSIFICATION OF CATARACT Right 12/4/2022    Procedure: PHACOEMULSIFICATION, CATARACT;  Surgeon: Alia Mckeon MD;  Location: [facility] OR [address] [place] [address];  Service:  Ophthalmology;  Laterality: Right;    SPINE SURGERY      VASECTOMY         Review of patient's allergies indicates:   Allergen Reactions    Fosinopril      Intolerance- elevates potassium level      Losartan      Intolerance- elevates potassium level       Current Facility-Administered Medications on File Prior to Encounter   Medication    ondansetron injection 4 mg    ondansetron injection 4 mg    phenylephrine HCL 2.5% ophthalmic solution 1 drop    phenylephrine HCL 2.5% ophthalmic solution 1 drop    proparacaine 0.5 % ophthalmic solution 1 drop    proparacaine 0.5 % ophthalmic solution 1 drop    tropicamide 1% ophthalmic solution 1 drop    tropicamide 1% ophthalmic solution 1 drop     Current Outpatient Medications on File Prior to Encounter   Medication Sig    allopurinoL (ZYLOPRIM) 100 MG tablet TAKE 1 TABLET EVERY DAY    bumetanide (BUMEX) 1 MG tablet Take 1 tablet (1 mg total) by mouth once daily.    ferrous gluconate (FERGON) 324 MG tablet Take 1 tablet (324 mg total) by mouth daily with breakfast.    isosorbide mononitrate (IMDUR) 60 MG 24 hr tablet Take 1 tablet (60 mg total) by mouth once daily.    lisinopriL (PRINIVIL,ZESTRIL) 2.5 MG tablet Take 1 tablet (2.5 mg total) by mouth once daily.    loperamide (IMODIUM) 2 mg capsule Take 2 mg by mouth 4 (four) times daily as needed for Diarrhea.    metoprolol succinate (TOPROL-XL) 25 MG 24 hr tablet Take 0.5 tablets (12.5 mg total) by mouth once daily.    oxymetazoline (AFRIN) 0.05 % nasal spray 2 sprays by Nasal route daily as needed (nose bleed).    prednisolon/gatiflox/bromfenac (PREDNISOL ACE-GATIFLOX-BROMFEN) 1-0.5-0.075 % DrpS Apply 1 drop to eye 3 (three) times daily. One drop 3 times a day in surgical eye    rosuvastatin (CRESTOR) 40 MG Tab TAKE 1 TABLET EVERY EVENING.    warfarin (COUMADIN) 5 MG tablet Take 1 tablet (5 mg total) by mouth Daily.     Family History       Problem Relation (Age of Onset)    Alcohol abuse Father    Diabetes Brother    Heart  disease Mother, Father, Brother, Sister    Heart failure Mother, Father, Brother    No Known Problems Sister, Maternal Grandmother, Maternal Grandfather, Paternal Grandmother, Paternal Grandfather, Maternal Aunt, Maternal Uncle, Paternal Aunt, Paternal Uncle          Tobacco Use    Smoking status: Former     Packs/day: 1.00     Years: 20.00     Pack years: 20.00     Types: Cigarettes     Quit date: 1980     Years since quittin.3     Passive exposure: Never    Smokeless tobacco: Never   Substance and Sexual Activity    Alcohol use: No    Drug use: No    Sexual activity: Not Currently     Review of Systems   Constitutional: Positive for decreased appetite. Negative for diaphoresis, fever and malaise/fatigue.   Cardiovascular:  Negative for chest pain, leg swelling and palpitations.   Respiratory:  Positive for shortness of breath. Negative for cough.    All other systems reviewed and are negative.  Objective:     Vital Signs (Most Recent):  Temp: 98.2 °F (36.8 °C) (22 2100)  Pulse: 71 (22 2300)  Resp: (!) 21 (22 2300)  BP: (!) 158/70 (22 2300)  SpO2: 97 % (22 2300)   Vital Signs (24h Range):  Temp:  [98.1 °F (36.7 °C)-98.4 °F (36.9 °C)] 98.2 °F (36.8 °C)  Pulse:  [67-76] 71  Resp:  [16-22] 21  SpO2:  [95 %-100 %] 97 %  BP: (110-174)/(61-71) 158/70     Weight: 78.5 kg (173 lb)  Body mass index is 28.79 kg/m².    SpO2: 97 %       No intake or output data in the 24 hours ending 22 0002    Lines/Drains/Airways       Peripheral Intravenous Line  Duration                  Peripheral IV - Single Lumen 22 1955 18 G Left Forearm <1 day                    Physical Exam  Vitals and nursing note reviewed.   Constitutional:       Appearance: Normal appearance.   HENT:      Head: Normocephalic.   Cardiovascular:      Rate and Rhythm: Normal rate. Rhythm irregular.      Pulses: Normal pulses.      Heart sounds: Normal heart sounds.   Pulmonary:      Effort: Pulmonary effort is  normal. No respiratory distress.      Breath sounds: Normal breath sounds.   Abdominal:      General: There is distension.      Palpations: Abdomen is soft.   Musculoskeletal:      Cervical back: Normal range of motion.      Right lower leg: No edema.      Left lower leg: No edema.   Skin:     General: Skin is warm.   Neurological:      General: No focal deficit present.      Mental Status: He is alert.       Significant Labs: All pertinent lab results from the last 24 hours have been reviewed.

## 2022-12-30 NOTE — ASSESSMENT & PLAN NOTE
Concern for acute on chronic diastolic heart failure exacerbation is the etiology  -also could be secondary to symptomatic anemia  -given that giving transfusion now if he was in heart failure which could exacerbate symptoms well hold any blood transfusions send a type and screen in the morning  -s/p 40 mg mg IV Lasix in the ED, approximately 600 cc urine output so far monitor final output in the morning and will give another 60 mg IV Lasix daily for next 2 days, (titrate as needed)  -repeat echocardiogram  Monitor CBC and if symptoms do not improve with diuresis and echo does not demonstrate acute congestion then consider administering 1 unit PRBCs  -also order PT OT

## 2022-12-30 NOTE — PLAN OF CARE
Pt was seen and examined.  Have not tried to walk yet to see if he is SOB on exertion or not.   2 d echo was done and it showed   The left ventricle is normal in size with mild concentric hypertrophy and mildly decreased systolic function.  The estimated ejection fraction is 48%.  There are segmental left ventricular wall motion abnormalities.  There is abnormal septal wall motion.  Grade III left ventricular diastolic dysfunction.  Severe left atrial enlargement.  Normal right ventricular size with normal right ventricular systolic function.  Mild right atrial enlargement.  There is a mechanical aortic valve present.  The aortic valve mean gradient is 17 mmHg with a dimensionless index of 0.38.  Moderate to moderate -severe ( 2-3+) MR  Moderate tricuspid regurgitation.  Normal central venous pressure (3 mmHg).  The estimated PA systolic pressure is 62 mmHg.  There is at least moderate pulmonary hypertension.    Continue lasix 40 mg iv bid.  PT/OT consult.  Pending further cards recs.

## 2022-12-30 NOTE — PROGRESS NOTES
I agree with the resident's assessment and plan for this patient.  Medication change of nifedipine also noted which should not affect INR.  
Patient is here for close follow up from sub therapeutic INRs. INR is therapeutic today, however at the upper end of the INR range. Patient reports he has been sick the past couple of weeks and is currently feeling and getting better. This is likely the cause of his INR today. Due to the fact he is feeling better, I will continue his current dose and see him back in 3 weeks to assess for any dose changes needed at that time as he has previously been stable on this dose. He reports normal bruising. I advised to contact clinic with any changes, questions, or concerns.   
Parkinson's disease

## 2022-12-30 NOTE — DISCHARGE INSTRUCTIONS
You are scheduled to have PET scan done on January 17th at 2:30 pm. Please show up to the echo clinic at the third [facility] main for this test. If you have any question, please call [phone].    Please go back to home dose of bumex 1mg daily. Recommend daily weights and take additional 1mg of Bumex if increase in weight by 3lbs in 1 day or 5lbs in 1 week.    Take warfarin 7.5 mg (1.5 tablets of the 5 mg) sun and Thursday and 5mg other days. See Coumadin clinic Tuesday for further adjustments and inr check. Cardiology also increased toprol to 25 daily instead of 12.5 mg daily.

## 2022-12-30 NOTE — HPI
82 y.o. male with past medical history of Melanoma, CAD s/p CABG, PCI (11/2021), mechanical aortic valve (2014) on coumadin, HFmEF and diastolic dysfunction, CVA, CKD III, HTN,  presented to the ED with dyspnea on exertion.    He was discharged from [facility] yesterday after being admitted from 12/21-12/28 due to recurrent epistaxis causing blood loss anemia requiring transfusion and also with type 2 MI myocardial injury secondary to the anemia, with 2 units PRBCs given.  During his admission troponin peaked at 2.4.  He was seen by ENT and IR consults, received nasal packing without any surgical procedures, cardiology consultation recommended goal hemoglobin greater than 8 in that acute setting.  He was discharged to continue Coumadin INR on admission was supratherapeutic and at discharge was subtherapeutic but bridging anticoagulation contraindicated with his recent bleeding.    Patient reports that while in the hospital he would ambulate within his room and out to the nurses station to get coffee but no further distances states that since discharge he has felt dyspnea with exertion he has no chest pain at rest no chest pressure.  He has not had any recurrence of epistaxis.  ED workup shows BNP 1 week ago was 300s now is 1188, patient is not on oxygen chest x-ray without any acute edema but concern is that patient has heart failure symptoms emergency department spoke with Cardiology and dosage of IV Lasix was recommended with further cardiac workup.    On my interview patient is urinated once at least 6-700 cc states he feels okay is okay with readmission and workup of potential heart failure also discussed that while he believed transfusion was the next step ruling out acute heart failure before administering transfusion would be important to avoid harm

## 2022-12-30 NOTE — PHARMACY MED REC
"    Admission Medication History     The home medication history was taken by Selvin Miranda.    You may go to "Admission" then "Reconcile Home Medications" tabs to review and/or act upon these items.     The home medication list has been updated by the Pharmacy department.   Please read ALL comments highlighted in yellow.   Please address this information as you see fit.    Feel free to contact us if you have any questions or require assistance.      Medications listed below were obtained from: Ameena- spouse and Medical records:                                                                           Med Rec completed 12/23/22                                                                             Current Outpatient Medications on File Prior to Encounter   Medication Sig    allopurinoL (ZYLOPRIM) 100 MG tablet TAKE 1 TABLET EVERY DAY    bumetanide (BUMEX) 1 MG tablet Take 1 tablet (1 mg total) by mouth once daily.    ferrous gluconate (FERGON) 324 MG tablet Take 1 tablet (324 mg total) by mouth daily with breakfast.    isosorbide mononitrate (IMDUR) 60 MG 24 hr tablet Take 1 tablet (60 mg total) by mouth once daily.    lisinopriL (PRINIVIL,ZESTRIL) 2.5 MG tablet Take 1 tablet (2.5 mg total) by mouth once daily.    loperamide (IMODIUM) 2 mg capsule Take 2 mg by mouth 4 (four) times daily as needed for Diarrhea.    oxymetazoline (AFRIN) 0.05 % nasal spray 2 sprays by Nasal route daily as needed (nose bleed).    prednisolon/gatiflox/bromfenac (PREDNISOL ACE-GATIFLOX-BROMFEN) 1-0.5-0.075 % DrpS Apply 1 drop to eye 3 (three) times daily. One drop 3 times a day in surgical eye    rosuvastatin (CRESTOR) 40 MG Tab TAKE 1 TABLET EVERY EVENING.    warfarin (COUMADIN) 5 MG tablet Take 1 tablet (5 mg total) by mouth Daily.    metoprolol succinate (TOPROL-XL) 25 MG 24 hr tablet Take 0.5 tablets (12.5 mg total) by mouth once daily.               Selvin Miranda  EXT [id number]              .        "

## 2022-12-30 NOTE — SUBJECTIVE & OBJECTIVE
Past Medical History:   Diagnosis Date    Anemia of chronic renal failure, stage 3 (moderate) 05/27/2015    Anticoagulant long-term use     Atherosclerosis of coronary artery bypass graft of native heart without angina pectoris 09/11/2012    3-27-18 [facility] Two vessel coronary artery disease.   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    Bilateral carotid artery disease 02/09/2017    Bleeding from the nose     Bleeding nose 03/21/2018    Cataract     CKD (chronic kidney disease) stage 3, GFR 30-59 ml/min 05/27/2015    Claudication of left lower extremity 09/17/2014    Colon polyp     Encounter for blood transfusion     Gastroesophageal reflux disease without esophagitis 03/19/2018    Gastrointestinal hemorrhage associated with intestinal diverticulosis 04/01/2018    Glaucoma     H/O mechanical aortic valve replacement 09/17/2014    History of gout 09/26/2012    Hyperparathyroidism due to renal insufficiency 07/27/2015    Internal hemorrhoid 04/03/2018    Long term current use of anticoagulant therapy 09/26/2012    Mechanical heart valve present     Metabolic acidosis with normal anion gap and bicarbonate losses 03/20/2018    Mixed hyperlipidemia 09/26/2012    NSTEMI (non-ST elevated myocardial infarction) 03/21/2018    Obesity, diabetes, and hypertension syndrome 02/23/2016    PVD (peripheral vascular disease) 09/11/2012    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    Syncope 09/29/2022    Type 2 diabetes mellitus with diabetic peripheral angiopathy without gangrene 05/27/2015    Type 2 diabetes mellitus with stage 3 chronic kidney disease, without long-term current use of insulin 10/02/2013       Past Surgical History:   Procedure Laterality Date    CARDIAC CATHETERIZATION      CARDIAC VALVE REPLACEMENT      CARDIAC VALVE SURGERY      CARPAL TUNNEL RELEASE Right 05/19/2020    Procedure: RELEASE, CARPAL TUNNEL;  Surgeon: Rupesh Norris Jr., MD;  Location: [facility];  Service: Plastics;  Laterality:  Right;    CATARACT EXTRACTION Left 11/13/2022        COLON SURGERY      COLONOSCOPY N/A 03/31/2017    Procedure: COLONOSCOPY;  Surgeon: Bruno Raymond MD;  Location: [facility] (4TH FLR);  Service: Endoscopy;  Laterality: N/A;  Patient's wife requesting date.    COLONOSCOPY N/A 04/03/2018    Procedure: COLONOSCOPY;  Surgeon: Bonifacio Pelletier MD;  Location: [facility] ENDO (2ND FLR);  Service: Endoscopy;  Laterality: N/A;    COLONOSCOPY N/A 08/13/2018    Procedure: COLONOSCOPY;  Surgeon: Kam Barba MD;  Location: [facility] ENDO (2ND FLR);  Service: Endoscopy;  Laterality: N/A;  2nd floor: PA pressure 49; hx of moderate-severe valve disease     per Coumadin clinic-Patient can hold 5 days with lovenox bridge       ok to schedule per Katarina    CORONARY ANGIOGRAPHY N/A 10/04/2021    Procedure: Left heart cath +/- peripheral angiogram;  Surgeon: Jose Ruiz MD;  Location: [facility] CATH LAB;  Service: Cardiology;  Laterality: N/A;    CORONARY ANGIOPLASTY      CORONARY ARTERY BYPASS GRAFT      CORONARY BYPASS GRAFT ANGIOGRAPHY  10/04/2021    Procedure: Bypass graft study;  Surgeon: Jose Ruiz MD;  Location: [facility] CATH LAB;  Service: Cardiology;;    HERNIA REPAIR      INTRAOCULAR PROSTHESES INSERTION Left 11/13/2022    Procedure: INSERTION, IOL PROSTHESIS;  Surgeon: Alia Mckeon MD;  Location: [facility] OR [address] [place] [address];  Service: Ophthalmology;  Laterality: Left;    INTRAOCULAR PROSTHESES INSERTION Right 12/4/2022    Procedure: INSERTION, IOL PROSTHESIS;  Surgeon: Alia Mckeon MD;  Location: [address];  Service: Ophthalmology;  Laterality: Right;    PHACOEMULSIFICATION OF CATARACT Left 11/13/2022    Procedure: PHACOEMULSIFICATION, CATARACT;  Surgeon: Alia Mckeon MD;  Location: [facility] OR [address] [place] [address];  Service: Ophthalmology;  Laterality: Left;    PHACOEMULSIFICATION OF CATARACT Right 12/4/2022    Procedure: PHACOEMULSIFICATION, CATARACT;  Surgeon: Alia Mckeon MD;  Location: [facility] OR [address] [place] [address];  Service:  Ophthalmology;  Laterality: Right;    SPINE SURGERY      VASECTOMY         Review of patient's allergies indicates:   Allergen Reactions    Fosinopril      Intolerance- elevates potassium level      Losartan      Intolerance- elevates potassium level       Current Facility-Administered Medications on File Prior to Encounter   Medication    ondansetron injection 4 mg    ondansetron injection 4 mg    phenylephrine HCL 2.5% ophthalmic solution 1 drop    phenylephrine HCL 2.5% ophthalmic solution 1 drop    proparacaine 0.5 % ophthalmic solution 1 drop    proparacaine 0.5 % ophthalmic solution 1 drop    tropicamide 1% ophthalmic solution 1 drop    tropicamide 1% ophthalmic solution 1 drop     Current Outpatient Medications on File Prior to Encounter   Medication Sig    allopurinoL (ZYLOPRIM) 100 MG tablet TAKE 1 TABLET EVERY DAY    bumetanide (BUMEX) 1 MG tablet Take 1 tablet (1 mg total) by mouth once daily.    ferrous gluconate (FERGON) 324 MG tablet Take 1 tablet (324 mg total) by mouth daily with breakfast.    isosorbide mononitrate (IMDUR) 60 MG 24 hr tablet Take 1 tablet (60 mg total) by mouth once daily.    lisinopriL (PRINIVIL,ZESTRIL) 2.5 MG tablet Take 1 tablet (2.5 mg total) by mouth once daily.    loperamide (IMODIUM) 2 mg capsule Take 2 mg by mouth 4 (four) times daily as needed for Diarrhea.    metoprolol succinate (TOPROL-XL) 25 MG 24 hr tablet Take 0.5 tablets (12.5 mg total) by mouth once daily.    oxymetazoline (AFRIN) 0.05 % nasal spray 2 sprays by Nasal route daily as needed (nose bleed).    prednisolon/gatiflox/bromfenac (PREDNISOL ACE-GATIFLOX-BROMFEN) 1-0.5-0.075 % DrpS Apply 1 drop to eye 3 (three) times daily. One drop 3 times a day in surgical eye    rosuvastatin (CRESTOR) 40 MG Tab TAKE 1 TABLET EVERY EVENING.    warfarin (COUMADIN) 5 MG tablet Take 1 tablet (5 mg total) by mouth Daily.     Family History       Problem Relation (Age of Onset)    Alcohol abuse Father    Diabetes Brother    Heart  disease Mother, Father, Brother, Sister    Heart failure Mother, Father, Brother    No Known Problems Sister, Maternal Grandmother, Maternal Grandfather, Paternal Grandmother, Paternal Grandfather, Maternal Aunt, Maternal Uncle, Paternal Aunt, Paternal Uncle          Tobacco Use    Smoking status: Former     Packs/day: 1.00     Years: 20.00     Pack years: 20.00     Types: Cigarettes     Quit date: 1980     Years since quittin.3     Passive exposure: Never    Smokeless tobacco: Never   Substance and Sexual Activity    Alcohol use: No    Drug use: No    Sexual activity: Not Currently     Review of Systems   Constitutional:  Positive for activity change. Negative for appetite change, chills and fever.   HENT:  Negative for trouble swallowing.    Cardiovascular:  Negative for chest pain, palpitations and leg swelling.   Gastrointestinal:  Negative for abdominal pain.   Genitourinary:  Negative for dysuria.   Musculoskeletal:  Negative for back pain.   Neurological:  Positive for weakness.   Psychiatric/Behavioral:  Negative for confusion.    Objective:     Vital Signs (Most Recent):  Temp: 98.2 °F (36.8 °C) (22 2100)  Pulse: 71 (22 2300)  Resp: (!) 21 (22 2300)  BP: (!) 158/70 (22 2300)  SpO2: 97 % (22 2300)   Vital Signs (24h Range):  Temp:  [98.1 °F (36.7 °C)-98.4 °F (36.9 °C)] 98.2 °F (36.8 °C)  Pulse:  [67-76] 71  Resp:  [16-22] 21  SpO2:  [95 %-100 %] 97 %  BP: (110-174)/(61-71) 158/70     Weight: 78.5 kg (173 lb)  Body mass index is 28.79 kg/m².    Physical Exam  Constitutional:       Appearance: He is ill-appearing.   Neck:      Comments: JVD with abdominal jugular reflux  Cardiovascular:      Rate and Rhythm: Regular rhythm.      Heart sounds: S1 normal.      Comments: S2-mechanical click  Pulmonary:      Effort: No tachypnea or accessory muscle usage.      Breath sounds: Normal breath sounds.   Musculoskeletal:      Right lower leg: No edema.      Left lower le+  Pitting Edema present.   Skin:     General: Skin is warm and dry.      Comments: Scalp lesion   Neurological:      General: No focal deficit present.      Mental Status: He is alert and oriented to person, place, and time.   Psychiatric:         Mood and Affect: Mood normal.         Behavior: Behavior normal.           Significant Labs: All pertinent labs within the past 24 hours have been reviewed.  CBC:   Recent Labs   Lab 12/29/22 1956   WBC 6.08   HGB 7.6*   HCT 24.2*   *     CMP:   Recent Labs   Lab 12/29/22 1956      K 5.3*   *   CO2 20*   GLU 89   BUN 40*   CREATININE 1.8*   CALCIUM 10.0   PROT 6.6   ALBUMIN 3.3*   BILITOT 0.5   ALKPHOS 68   AST 15   ALT 11   ANIONGAP 4*     Cardiac Markers:   Recent Labs   Lab 12/29/22 1956   BNP 1,188*     Magnesium: No results for input(s): MG in the last 48 hours.  Troponin:   Recent Labs   Lab 12/29/22 1956   TROPONINI 0.164*       Significant Imaging: I have reviewed all pertinent imaging results/findings within the past 24 hours.    XR CHEST AP PORTABLE     CLINICAL HISTORY:  chest pain;     TECHNIQUE:  Single frontal view of the chest was performed.     COMPARISON:  Chest radiograph 12/21/2022     FINDINGS:  Monitoring leads overlie the chest.  No detrimental change.     Sternotomy wires and previous cardiac valve repair with scattered mediastinal clips appear stable.  Cardiomediastinal silhouette is midline and stable without evidence of failure.  Similar nonspecific elevation of the right hemidiaphragm.     Suspected skin fold overlies the lateral right upper lung zone.  Lungs are well expanded and clear.  No pleural effusion or pneumothorax.     No acute osseous process seen.  PA and lateral views can be obtained.     Impression:     No detrimental change or radiographic acute intrathoracic process seen on this single view.        Electronically signed by: Delano Taveras MD  Date:                                            12/29/2022  Time:                                            19:50

## 2022-12-30 NOTE — ASSESSMENT & PLAN NOTE
82 y.o. male with past medical history of Melanoma, CAD s/p CABG, PCI (11/2021), mechanical aortic valve (2014) on coumadin, HFmrEF and diastolic dysfunction, CVA, CKD III, HTN, discharged yesterday after admission for symptomatic anemia (Hb 6.9), type 2 MI/demand ischemia and received blood products (2-3u). During prior admission Hgb goal was >8 in the setting of ACS however at this time, patient not having chest pain. He is currently having dyspnea which I suspect is in the setting of acute decompensated heart failure after recent admission with multiple transfusions of RBC therefore recommend the following:    · Continue metoprolol succinate 25 mg daily with HR goal of 70-80s  · Cont Atorvastatin 80 mg  · Continue Entresto 24/26 BID   · Would obtain formal echo  · IV Lasix 40mg bid. Will reassess vol status in the morning and make further diuretic adjustments.  · Still recommend outpatient PET Stress Test  · Strict I/Os

## 2022-12-31 VITALS
RESPIRATION RATE: 18 BRPM | OXYGEN SATURATION: 98 % | BODY MASS INDEX: 27.25 KG/M2 | WEIGHT: 163.56 LBS | HEART RATE: 76 BPM | TEMPERATURE: 98 F | HEIGHT: 65 IN | SYSTOLIC BLOOD PRESSURE: 135 MMHG | DIASTOLIC BLOOD PRESSURE: 63 MMHG

## 2022-12-31 PROBLEM — I50.9 ACUTE DECOMPENSATED HEART FAILURE: Status: RESOLVED | Noted: 2022-12-30 | Resolved: 2022-12-31

## 2022-12-31 PROBLEM — I50.43 ACUTE ON CHRONIC COMBINED SYSTOLIC AND DIASTOLIC HEART FAILURE: Status: ACTIVE | Noted: 2022-12-31

## 2022-12-31 PROBLEM — I50.43 ACUTE ON CHRONIC COMBINED SYSTOLIC AND DIASTOLIC HEART FAILURE: Status: RESOLVED | Noted: 2022-12-31 | Resolved: 2022-12-31

## 2022-12-31 PROBLEM — R06.09 DYSPNEA ON EXERTION: Status: RESOLVED | Noted: 2020-03-02 | Resolved: 2022-12-31

## 2022-12-31 LAB
ANION GAP SERPL CALC-SCNC: 8 MMOL/L (ref 8–16)
APTT BLDCRRT: 65.8 SEC (ref 21–32)
APTT BLDCRRT: 68.3 SEC (ref 21–32)
BASOPHILS # BLD AUTO: 0.03 K/UL (ref 0–0.2)
BASOPHILS # BLD AUTO: 0.03 K/UL (ref 0–0.2)
BASOPHILS NFR BLD: 0.6 % (ref 0–1.9)
BASOPHILS NFR BLD: 0.6 % (ref 0–1.9)
BUN SERPL-MCNC: 40 MG/DL (ref 8–23)
CALCIUM SERPL-MCNC: 10.1 MG/DL (ref 8.7–10.5)
CHLORIDE SERPL-SCNC: 105 MMOL/L (ref 95–110)
CO2 SERPL-SCNC: 22 MMOL/L (ref 23–29)
CREAT SERPL-MCNC: 2 MG/DL (ref 0.5–1.4)
DIFFERENTIAL METHOD: ABNORMAL
DIFFERENTIAL METHOD: ABNORMAL
EOSINOPHIL # BLD AUTO: 0.2 K/UL (ref 0–0.5)
EOSINOPHIL # BLD AUTO: 0.2 K/UL (ref 0–0.5)
EOSINOPHIL NFR BLD: 3.4 % (ref 0–8)
EOSINOPHIL NFR BLD: 3.4 % (ref 0–8)
ERYTHROCYTE [DISTWIDTH] IN BLOOD BY AUTOMATED COUNT: 15.7 % (ref 11.5–14.5)
ERYTHROCYTE [DISTWIDTH] IN BLOOD BY AUTOMATED COUNT: 15.7 % (ref 11.5–14.5)
EST. GFR  (NO RACE VARIABLE): 32.9 ML/MIN/1.73 M^2
GLUCOSE SERPL-MCNC: 95 MG/DL (ref 70–110)
HCT VFR BLD AUTO: 27.5 % (ref 40–54)
HCT VFR BLD AUTO: 27.5 % (ref 40–54)
HGB BLD-MCNC: 8.5 G/DL (ref 14–18)
HGB BLD-MCNC: 8.5 G/DL (ref 14–18)
IMM GRANULOCYTES # BLD AUTO: 0.02 K/UL (ref 0–0.04)
IMM GRANULOCYTES # BLD AUTO: 0.02 K/UL (ref 0–0.04)
IMM GRANULOCYTES NFR BLD AUTO: 0.4 % (ref 0–0.5)
IMM GRANULOCYTES NFR BLD AUTO: 0.4 % (ref 0–0.5)
INR PPP: 1.3 (ref 0.8–1.2)
LYMPHOCYTES # BLD AUTO: 1.4 K/UL (ref 1–4.8)
LYMPHOCYTES # BLD AUTO: 1.4 K/UL (ref 1–4.8)
LYMPHOCYTES NFR BLD: 28.2 % (ref 18–48)
LYMPHOCYTES NFR BLD: 28.2 % (ref 18–48)
MAGNESIUM SERPL-MCNC: 1.7 MG/DL (ref 1.6–2.6)
MCH RBC QN AUTO: 31 PG (ref 27–31)
MCH RBC QN AUTO: 31 PG (ref 27–31)
MCHC RBC AUTO-ENTMCNC: 30.9 G/DL (ref 32–36)
MCHC RBC AUTO-ENTMCNC: 30.9 G/DL (ref 32–36)
MCV RBC AUTO: 100 FL (ref 82–98)
MCV RBC AUTO: 100 FL (ref 82–98)
MONOCYTES # BLD AUTO: 0.6 K/UL (ref 0.3–1)
MONOCYTES # BLD AUTO: 0.6 K/UL (ref 0.3–1)
MONOCYTES NFR BLD: 11.7 % (ref 4–15)
MONOCYTES NFR BLD: 11.7 % (ref 4–15)
NEUTROPHILS # BLD AUTO: 2.8 K/UL (ref 1.8–7.7)
NEUTROPHILS # BLD AUTO: 2.8 K/UL (ref 1.8–7.7)
NEUTROPHILS NFR BLD: 55.7 % (ref 38–73)
NEUTROPHILS NFR BLD: 55.7 % (ref 38–73)
NRBC BLD-RTO: 0 /100 WBC
NRBC BLD-RTO: 0 /100 WBC
PHOSPHATE SERPL-MCNC: 2.8 MG/DL (ref 2.7–4.5)
PLATELET # BLD AUTO: 189 K/UL (ref 150–450)
PLATELET # BLD AUTO: 189 K/UL (ref 150–450)
PMV BLD AUTO: 11.4 FL (ref 9.2–12.9)
PMV BLD AUTO: 11.4 FL (ref 9.2–12.9)
POCT GLUCOSE: 143 MG/DL (ref 70–110)
POCT GLUCOSE: 96 MG/DL (ref 70–110)
POTASSIUM SERPL-SCNC: 4.4 MMOL/L (ref 3.5–5.1)
PROTHROMBIN TIME: 13.2 SEC (ref 9–12.5)
RBC # BLD AUTO: 2.74 M/UL (ref 4.6–6.2)
RBC # BLD AUTO: 2.74 M/UL (ref 4.6–6.2)
SODIUM SERPL-SCNC: 135 MMOL/L (ref 136–145)
WBC # BLD AUTO: 4.96 K/UL (ref 3.9–12.7)
WBC # BLD AUTO: 4.96 K/UL (ref 3.9–12.7)

## 2022-12-31 PROCEDURE — 80048 BASIC METABOLIC PNL TOTAL CA: CPT | Mod: HCNC | Performed by: HOSPITALIST

## 2022-12-31 PROCEDURE — 96366 THER/PROPH/DIAG IV INF ADDON: CPT

## 2022-12-31 PROCEDURE — G0378 HOSPITAL OBSERVATION PER HR: HCPCS | Mod: HCNC

## 2022-12-31 PROCEDURE — 99217 PR OBSERVATION CARE DISCHARGE: ICD-10-PCS | Mod: HCNC,,, | Performed by: HOSPITALIST

## 2022-12-31 PROCEDURE — 93005 ELECTROCARDIOGRAM TRACING: CPT | Mod: HCNC

## 2022-12-31 PROCEDURE — 85610 PROTHROMBIN TIME: CPT | Mod: HCNC | Performed by: HOSPITALIST

## 2022-12-31 PROCEDURE — 93010 EKG 12-LEAD: ICD-10-PCS | Mod: HCNC,,, | Performed by: INTERNAL MEDICINE

## 2022-12-31 PROCEDURE — 97165 OT EVAL LOW COMPLEX 30 MIN: CPT | Mod: HCNC

## 2022-12-31 PROCEDURE — 83735 ASSAY OF MAGNESIUM: CPT | Mod: HCNC | Performed by: HOSPITALIST

## 2022-12-31 PROCEDURE — 85025 COMPLETE CBC W/AUTO DIFF WBC: CPT | Mod: HCNC | Performed by: HOSPITALIST

## 2022-12-31 PROCEDURE — 99225 PR SUBSEQUENT OBSERVATION CARE,LEVEL II: CPT | Mod: HCNC,,, | Performed by: INTERNAL MEDICINE

## 2022-12-31 PROCEDURE — 93010 ELECTROCARDIOGRAM REPORT: CPT | Mod: HCNC,,, | Performed by: INTERNAL MEDICINE

## 2022-12-31 PROCEDURE — 63600175 PHARM REV CODE 636 W HCPCS: Mod: HCNC

## 2022-12-31 PROCEDURE — 25000003 PHARM REV CODE 250: Mod: HCNC | Performed by: HOSPITALIST

## 2022-12-31 PROCEDURE — 84100 ASSAY OF PHOSPHORUS: CPT | Mod: HCNC | Performed by: HOSPITALIST

## 2022-12-31 PROCEDURE — 99225 PR SUBSEQUENT OBSERVATION CARE,LEVEL II: ICD-10-PCS | Mod: HCNC,,, | Performed by: INTERNAL MEDICINE

## 2022-12-31 PROCEDURE — 94761 N-INVAS EAR/PLS OXIMETRY MLT: CPT | Mod: HCNC

## 2022-12-31 PROCEDURE — 99217 PR OBSERVATION CARE DISCHARGE: CPT | Mod: HCNC,,, | Performed by: HOSPITALIST

## 2022-12-31 PROCEDURE — 85730 THROMBOPLASTIN TIME PARTIAL: CPT | Mod: HCNC | Performed by: HOSPITALIST

## 2022-12-31 PROCEDURE — 96376 TX/PRO/DX INJ SAME DRUG ADON: CPT

## 2022-12-31 RX ORDER — OXYMETAZOLINE HCL 0.05 %
2 SPRAY, NON-AEROSOL (ML) NASAL DAILY PRN
Qty: 30 ML | Refills: 0 | Status: ON HOLD | OUTPATIENT
Start: 2022-12-31 | End: 2023-12-15

## 2022-12-31 RX ORDER — WARFARIN SODIUM 5 MG/1
TABLET ORAL
Qty: 30 TABLET | Refills: 11 | Status: ON HOLD
Start: 2022-12-31 | End: 2023-04-15 | Stop reason: SDUPTHER

## 2022-12-31 RX ORDER — METOPROLOL SUCCINATE 25 MG/1
25 TABLET, EXTENDED RELEASE ORAL DAILY
Qty: 30 TABLET | Refills: 0 | Status: SHIPPED | OUTPATIENT
Start: 2022-12-31 | End: 2023-02-14 | Stop reason: SDUPTHER

## 2022-12-31 RX ADMIN — ISOSORBIDE MONONITRATE 60 MG: 60 TABLET, EXTENDED RELEASE ORAL at 08:12

## 2022-12-31 RX ADMIN — WARFARIN SODIUM 5 MG: 5 TABLET ORAL at 04:12

## 2022-12-31 RX ADMIN — LISINOPRIL 2.5 MG: 2.5 TABLET ORAL at 08:12

## 2022-12-31 RX ADMIN — Medication 324 MG: at 08:12

## 2022-12-31 RX ADMIN — METOPROLOL SUCCINATE 12.5 MG: 25 TABLET, EXTENDED RELEASE ORAL at 08:12

## 2022-12-31 RX ADMIN — ALLOPURINOL 100 MG: 100 TABLET ORAL at 08:12

## 2022-12-31 RX ADMIN — FUROSEMIDE 80 MG: 10 INJECTION, SOLUTION INTRAMUSCULAR; INTRAVENOUS at 04:12

## 2022-12-31 NOTE — SUBJECTIVE & OBJECTIVE
Interval History: feels better. His SOB has improved.  However, his nosebleed started this am and pt is very worried. Mild nose bleed, and so RN placed a nose packing. Will hold heparin and reassess in few hours.     Review of Systems   Constitutional:  Negative for chills and fever.   HENT:  Positive for nosebleeds. Negative for sore throat.    Eyes:  Negative for photophobia and visual disturbance.   Respiratory:  Positive for shortness of breath. Negative for cough.    Cardiovascular:  Negative for chest pain and palpitations.   Gastrointestinal:  Negative for abdominal pain, nausea and vomiting.   Genitourinary:  Negative for dysuria, frequency and urgency.   Skin:         Cancerous lesion on the top of his head.    Neurological:  Negative for syncope and headaches.   Psychiatric/Behavioral:  Negative for confusion, hallucinations and sleep disturbance.    Objective:     Vital Signs (Most Recent):  Temp: 97.8 °F (36.6 °C) (12/31/22 0750)  Pulse: 62 (12/31/22 0750)  Resp: 18 (12/31/22 0750)  BP: 131/63 (12/31/22 0750)  SpO2: 99 % (12/31/22 1014) Vital Signs (24h Range):  Temp:  [97 °F (36.1 °C)-98.4 °F (36.9 °C)] 97.8 °F (36.6 °C)  Pulse:  [62-79] 62  Resp:  [16-18] 18  SpO2:  [95 %-99 %] 99 %  BP: (114-166)/(55-75) 131/63     Weight: 74.2 kg (163 lb 9.3 oz)  Body mass index is 27.22 kg/m².    Intake/Output Summary (Last 24 hours) at 12/31/2022 1129  Last data filed at 12/31/2022 0842  Gross per 24 hour   Intake 120 ml   Output 2225 ml   Net -2105 ml      Physical Exam  Vitals reviewed.   Constitutional:       Appearance: Normal appearance.   HENT:      Head: Normocephalic and atraumatic.      Nose:      Comments: Dry bleed in left nostril.   Eyes:      Pupils: Pupils are equal, round, and reactive to light.   Cardiovascular:      Rate and Rhythm: Normal rate and regular rhythm.   Pulmonary:      Effort: Pulmonary effort is normal.      Breath sounds: Normal breath sounds.   Abdominal:      General: Abdomen is  flat. Bowel sounds are normal.      Palpations: Abdomen is soft.   Musculoskeletal:         General: No swelling. Normal range of motion.      Cervical back: Normal range of motion and neck supple.   Skin:     General: Skin is warm.      Comments: Large black irregular cancer appearing lesion at the top of his head.    Neurological:      General: No focal deficit present.      Mental Status: He is alert and oriented to person, place, and time.   Psychiatric:         Mood and Affect: Mood normal.         Behavior: Behavior normal.       Significant Labs: All pertinent labs within the past 24 hours have been reviewed.  BMP:   Recent Labs   Lab 12/31/22  0617   GLU 95   *   K 4.4      CO2 22*   BUN 40*   CREATININE 2.0*   CALCIUM 10.1   MG 1.7     CBC:   Recent Labs   Lab 12/30/22 0421 12/30/22  1543 12/31/22  0617   WBC 7.42 5.82 4.96  4.96   HGB 7.5* 8.0* 8.5*  8.5*   HCT 24.6* 26.8* 27.5*  27.5*    179 189  189     CMP:   Recent Labs   Lab 12/29/22  1956 12/30/22  0421 12/31/22  0617    139 135*   K 5.3* 5.0 4.4   * 111* 105   CO2 20* 19* 22*   GLU 89 75 95   BUN 40* 37* 40*   CREATININE 1.8* 1.8* 2.0*   CALCIUM 10.0 9.7 10.1   PROT 6.6  --   --    ALBUMIN 3.3*  --   --    BILITOT 0.5  --   --    ALKPHOS 68  --   --    AST 15  --   --    ALT 11  --   --    ANIONGAP 4* 9 8       Significant Imaging: I have reviewed all pertinent imaging results/findings within the past 24 hours.

## 2022-12-31 NOTE — HOSPITAL COURSE
Patient was admitted with the diagnosis of acute on chronic combined heart failure exacerbation.  He was started on Lasix IV which was later increased to 80 mg b.i.d. by Cardiology.  He diuresed well, and felt much better today.  As a result Cardiology cleared the patient for discharge today on his home dose of bumex 1 mg daily, with instructions to weigh himself everyday. If wt increased by 3 Ibs in one day or 5 Ibs in 1 week, then he can take an extra dose of bumex 1 mg. They also recommended to discharge pt on toprol xl 25 mg, lisinopril 2.5 mg daily and to have PET scan done outpt. Writer scheduled the PET scan on 1/17 prior to pt's discharge. Discussed with cardiology, Farxiga will be initiated outpt after his cardiology follow up.    Please note that on 12/30, Pharmacy recommended to bridge pt with heparin while being on coumadin given his INR of 1.2 in the light of his h/o aortic mechanical valve. Cardiology agreed with this plan of care. Heparin drip low intensity was started as well as Coumadin.  Today, patient had mild left nostril bleed.  Packing was applied.  Heparin drip was stopped.  Nosebleed stopped soon after.  Cardiology recommended to hold bridging meds on discharge given pt's nose bleed episode this am. They recommended to discharge pt on coumadin 7.5 mg Sunday and Thursday and 5 mg all other days, which was his prior home regimen.  Patient will follow up with the anticoagulation clinic on Tuesday January 3rd for INR check to adjust his Coumadin doses as needed.    Patient's tele showed AFib today prior to his discharge.  EKG was done which confirmed AFib.  I did contact Cardiology again prior to discharge, and per Cardiology, no further records at the current time, patient is already on Toprol for rate control, and Coumadin for anticoagulation.  He will need to see Cardiology outpatient for further management.    Given that his clinical status improved, we will discharge the patient home with  follow up with Cardiology and PCP outpatient.    Physical exam  Vitals; reviewed  General:  No acute distress  HENT; NC/AT, 2*1.5 cm black lesion at the top of his head irregular in shape and appears cancerous.   CV; RRR  Resp; CTA b/l  Abdomen; soft, NT, ND, +ve BS  Extremities; traces of edema bilateral LE.

## 2022-12-31 NOTE — PROGRESS NOTES
[facility] - Cardiology Stepdown  Cardiology  Progress Note    Patient Name: Dariel Urbina  MRN: [id number]  Admission Date: 12/29/2022  Hospital Length of Stay: 0 days  Code Status: Full Code   Attending Physician: Maeve Huang MD   Primary Care Physician: Devon Langston MD  Expected Discharge Date:   Principal Problem:Acute decompensated heart failure      Assessment and Plan:     * Acute decompensated heart failure  82 y.o. male with past medical history of Melanoma, CAD s/p CABG, PCI (11/2021), mechanical aortic valve (2014) on coumadin, HFmrEF and diastolic dysfunction, CVA, CKD III, HTN, discharged yesterday after admission for symptomatic anemia (Hb 6.9), type 2 MI/demand ischemia and received blood products (2-3u). During prior admission Hgb goal was >8 in the setting of ACS however at this time, patient not having chest pain. Admitted dyspnea which I suspect is in the setting of acute decompensated heart failure after recent admission with multiple transfusions of RBC. After adequate diuresis patient now looks euvolemic and recommend the following:    · Recommend increase to metoprolol succinate 25 mg daily with HR goal of 60-80s  · Cont Atorvastatin 80 mg  · Continue Lisinopril 2.5mg daily  · Continue Imdur 60mg daily  · Resume home regimen of Bumex 1mg daily.  · Recommend daily weights and take additional 1mg of Bumex if increase in weight by 3lbs in 1 day or 5lbs in 1 week  · Still recommend outpatient PET Stress Test  ·  Will need to reassess degree of MR as an outpatient with repeat once he is adequately diuresed.  · Recommend initiation of Farxiga 5mg daily at discharge- please send to [facility] and check have BMP checked in 1 week.  · Strict I/Os  · Follow up with Cardiology outpatient    H/O mechanical aortic valve replacement  Recurrent epistaxis, unable to tolerate dual antiplatelet therapy. Currently no more bleeding.   ·  discontinued heparin gtt for bridge given recurrence  of epistaxis  · Advised patient to resume home regimen on  Warfarin 7.5 mg Sun/Thurs and 5mg all other days despite bleeding risk with goal INR closer to 2 due to risk of valve thrombosis    · Recommend patient be seen by coumadin clinic Tuesday for INR check and further modification to warfarin regimen     At this time, Cardiology will sign off. Please reach out if any further questions. Thank you    Barbie Zelaya MD  Cardiology  [facility] - Cardiology Stepdown    Subjective:       Interval History:    No acute events over night.   Yesterday lasix was increased to 80MG IV BID with good response. Uop 1.6L in 24 hours and net -3.6L since admission. Creatinine slightly increasing to 2.0 suggesting good diuresis.   Patient reports improvement in SOB. Walking around and toelrating well   He was started on heparin gtt for bridging with subtherapeutic INR however had an episode of epistaxis this morning and therefore subsequently discontinued. INR remains subtherapeutic at 1.3    Review of Systems   Constitutional: Negative for decreased appetite, diaphoresis, fever and malaise/fatigue.   Cardiovascular:  Negative for chest pain, leg swelling and palpitations.   Respiratory:  Negative for cough and shortness of breath.    All other systems reviewed and are negative.  Objective:     Vital Signs (Most Recent):  Temp: 97.9 °F (36.6 °C) (12/31/22 1150)  Pulse: 82 (12/31/22 1150)  Resp: 18 (12/31/22 1150)  BP: (!) 108/58 (12/31/22 1150)  SpO2: 99 % (12/31/22 1150)   Vital Signs (24h Range):  Temp:  [97 °F (36.1 °C)-98.4 °F (36.9 °C)] 97.9 °F (36.6 °C)  Pulse:  [62-82] 82  Resp:  [16-18] 18  SpO2:  [95 %-99 %] 99 %  BP: (108-166)/(55-75) 108/58     Weight: 74.2 kg (163 lb 9.3 oz)  Body mass index is 27.22 kg/m².     SpO2: 99 %         Intake/Output Summary (Last 24 hours) at 12/31/2022 1214  Last data filed at 12/31/2022 1134  Gross per 24 hour   Intake 120 ml   Output 2525 ml   Net -2405 ml       Lines/Drains/Airways        Peripheral Intravenous Line  Duration                  Peripheral IV - Single Lumen 12/29/22 1955 18 G Left Forearm 1 day                    Physical Exam  Vitals and nursing note reviewed.   Constitutional:       Appearance: Normal appearance.   HENT:      Head: Normocephalic.   Cardiovascular:      Rate and Rhythm: Normal rate.      Pulses: Normal pulses.      Heart sounds: Normal heart sounds.   Pulmonary:      Effort: Pulmonary effort is normal. No respiratory distress.      Breath sounds: Normal breath sounds.   Abdominal:      General: There is distension.      Palpations: Abdomen is soft.   Musculoskeletal:      Cervical back: Normal range of motion.      Right lower leg: No edema.      Left lower leg: No edema.   Skin:     General: Skin is warm.   Neurological:      General: No focal deficit present.      Mental Status: He is alert.       Significant Labs: All pertinent lab results from the last 24 hours have been reviewed.

## 2022-12-31 NOTE — ASSESSMENT & PLAN NOTE
Patient's FSGs are controlled on current medication regimen.  Last A1c reviewed-   Lab Results   Component Value Date    HGBA1C 5.4 10/03/2022     Most recent fingerstick glucose reviewed-   Recent Labs   Lab 12/30/22 2209   POCTGLUCOSE 97     Current correctional scale  Low  Maintain anti-hyperglycemic dose as follows-   Antihyperglycemics (From admission, onward)    Start     Stop Route Frequency Ordered    12/30/22 0107  insulin aspart U-100 pen 0-5 Units         -- SubQ Before meals & nightly PRN 12/30/22 0009        Hold Oral hypoglycemics while patient is in the hospital.

## 2022-12-31 NOTE — SUBJECTIVE & OBJECTIVE
Interval History:   No acute events over night.  Yesterday lasix was increased to 80MG IV BID with good response. Uop 1.6L in 24 hours and net -3.6L since admission. Creatinine slightly increasing to 2.0 suggesting good diuresis.  Patient reports improvement in SOB. Walking around and toelrating well  He was started on heparin gtt for bridging with subtherapeutic INR however had an episode of epistaxis this morning and therefore subsequently discontinued. INR remains subtherapeutic at 1.3    Review of Systems   Constitutional: Negative for decreased appetite, diaphoresis, fever and malaise/fatigue.   Cardiovascular:  Negative for chest pain, leg swelling and palpitations.   Respiratory:  Negative for cough and shortness of breath.    All other systems reviewed and are negative.  Objective:     Vital Signs (Most Recent):  Temp: 97.9 °F (36.6 °C) (12/31/22 1150)  Pulse: 82 (12/31/22 1150)  Resp: 18 (12/31/22 1150)  BP: (!) 108/58 (12/31/22 1150)  SpO2: 99 % (12/31/22 1150)   Vital Signs (24h Range):  Temp:  [97 °F (36.1 °C)-98.4 °F (36.9 °C)] 97.9 °F (36.6 °C)  Pulse:  [62-82] 82  Resp:  [16-18] 18  SpO2:  [95 %-99 %] 99 %  BP: (108-166)/(55-75) 108/58     Weight: 74.2 kg (163 lb 9.3 oz)  Body mass index is 27.22 kg/m².     SpO2: 99 %         Intake/Output Summary (Last 24 hours) at 12/31/2022 1214  Last data filed at 12/31/2022 1134  Gross per 24 hour   Intake 120 ml   Output 2525 ml   Net -2405 ml       Lines/Drains/Airways       Peripheral Intravenous Line  Duration                  Peripheral IV - Single Lumen 12/29/22 1955 18 G Left Forearm 1 day                    Physical Exam  Vitals and nursing note reviewed.   Constitutional:       Appearance: Normal appearance.   HENT:      Head: Normocephalic.   Cardiovascular:      Rate and Rhythm: Normal rate.      Pulses: Normal pulses.      Heart sounds: Normal heart sounds.   Pulmonary:      Effort: Pulmonary effort is normal. No respiratory distress.      Breath  sounds: Normal breath sounds.   Abdominal:      General: There is distension.      Palpations: Abdomen is soft.   Musculoskeletal:      Cervical back: Normal range of motion.      Right lower leg: No edema.      Left lower leg: No edema.   Skin:     General: Skin is warm.   Neurological:      General: No focal deficit present.      Mental Status: He is alert.       Significant Labs: All pertinent lab results from the last 24 hours have been reviewed.

## 2022-12-31 NOTE — NURSING
Notified team Tele monitor shows Afib, HR stays in 60s to 80s, asymptomatic, WCTM. EKG ordered and done, notified team it's A fib on EKG.

## 2022-12-31 NOTE — ASSESSMENT & PLAN NOTE
Recurrent epistaxis, unable to tolerate dual antiplatelet therapy. Currently no more bleeding.   ·  discontinued heparin gtt for bridge given recurrence of epistaxis  · Advised patient to resume home regimen on  Warfarin 7.5 mg Sun/Thurs and 5mg all other days despite bleeding risk with goal INR closer to 2 due to risk of valve thrombosis    · Recommend patient be seen by coumadin clinic Tuesday for INR check and further modification to warfarin regimen

## 2022-12-31 NOTE — PLAN OF CARE
Vitals stable, no events. Titrated heparin gtt per nomogram. Patient remained free of falls and injury. Comfort and safety maintained. Continue to monitor.   Problem: Adult Inpatient Plan of Care  Goal: Plan of Care Review  Outcome: Ongoing, Progressing  Goal: Patient-Specific Goal (Individualized)  Outcome: Ongoing, Progressing  Goal: Absence of Hospital-Acquired Illness or Injury  Outcome: Ongoing, Progressing  Goal: Optimal Comfort and Wellbeing  Outcome: Ongoing, Progressing  Goal: Readiness for Transition of Care  Outcome: Ongoing, Progressing     Problem: Infection  Goal: Absence of Infection Signs and Symptoms  Outcome: Ongoing, Progressing     Problem: Diabetes Comorbidity  Goal: Blood Glucose Level Within Targeted Range  Outcome: Ongoing, Progressing     Problem: Fall Injury Risk  Goal: Absence of Fall and Fall-Related Injury  Outcome: Ongoing, Progressing

## 2022-12-31 NOTE — ASSESSMENT & PLAN NOTE
INR is subtherapeutic, and has been so since admission.  Today inr is 1.3  Heparin held this am due to nose bleed. Now nose bleed stopped. Will resume heparin in 2 hours.   Continue coumadin 5 mg daily.

## 2022-12-31 NOTE — ASSESSMENT & PLAN NOTE
82 y.o. male with past medical history of Melanoma, CAD s/p CABG, PCI (11/2021), mechanical aortic valve (2014) on coumadin, HFmrEF and diastolic dysfunction, CVA, CKD III, HTN, discharged yesterday after admission for symptomatic anemia (Hb 6.9), type 2 MI/demand ischemia and received blood products (2-3u). During prior admission Hgb goal was >8 in the setting of ACS however at this time, patient not having chest pain. Admitted dyspnea which I suspect is in the setting of acute decompensated heart failure after recent admission with multiple transfusions of RBC. After adequate diuresis patient now looks euvolemic and recommend the following:    · Continue metoprolol succinate 25 mg daily with HR goal of 70-80s  · Cont Atorvastatin 80 mg  · Continue Entresto 24/26 BID   · Would obtain formal echo  · IV Lasix 40mg bid. Will reassess vol status in the morning and make further diuretic adjustments.  · Still recommend outpatient PET Stress Test  · Strict I/Os

## 2022-12-31 NOTE — ASSESSMENT & PLAN NOTE
82 y.o. male with past medical history of Melanoma, CAD s/p CABG, PCI (11/2021), mechanical aortic valve (2014) on coumadin, HFmrEF and diastolic dysfunction, CVA, CKD III, HTN, discharged yesterday after admission for symptomatic anemia (Hb 6.9), type 2 MI/demand ischemia and received blood products (2-3u). During prior admission Hgb goal was >8 in the setting of ACS however at this time, patient not having chest pain. Admitted dyspnea which I suspect is in the setting of acute decompensated heart failure after recent admission with multiple transfusions of RBC. After adequate diuresis patient now looks euvolemic and recommend the following:    · Recommend increase to metoprolol succinate 25 mg daily with HR goal of 60-80s  · Cont Atorvastatin 80 mg  · Continue Lisinopril 2.5mg daily  · Continue Imdur 60mg daily  · Resume home regimen of Bumex 1mg daily.  · Recommend daily weights and take additional 1mg of Bumex if increase in weight by 3lbs in 1 day or 5lbs in 1 week  · Still recommend outpatient PET Stress Test  ·  Will need to reassess degree of MR as an outpatient with repeat once he is adequately diuresed.  · Recommend initiation of Farxiga 5mg daily at discharge- please send to [facility] and check have BMP checked in 1 week.  · Strict I/Os  · Follow up with Cardiology outpatient

## 2022-12-31 NOTE — PLAN OF CARE
Pt and spouse given discharge instruction, medication reviewed with the pt, follow up appts reviewed. All questions and concerns addressed. Pt verbalized understanding. IV, telemetry removed. Discharge paperwork given to patient. Pt in room waiting for transport.

## 2022-12-31 NOTE — PT/OT/SLP EVAL
Occupational Therapy   Evaluation and discharge     Name: Dariel Urbina  MRN: [id number]  Admitting Diagnosis: Acute decompensated heart failure    Recommendations:     Discharge Recommendations:    Home with family support.     Assessment:     Dariel Urbina is a 82 y.o. male with a medical diagnosis of Acute decompensated heart failure. Pt tolerated session well with good effort and performance. SOB noted with activity which did resolve with seated rest break. Anticipate pt will safe to return home with family support when medically ready     Plan:   D/C OT 12/31/2022     Subjective     Pt agreeable to therapy.     Occupational Profile:    Patient lives with his wife in a single story home with no steps to enter.   Patient reports being independent with mobility & with ADLs. Patient uses DME as follows: none.       Pain/Comfort:  Pain Rating 1: 0/10  Pt reports no pain at rest and pain in LE's with activity which fluctuates in level of intensity. Pt did not rate pain, but reports stopping activity resolves pain.     Patients cultural, spiritual, Yarsani conflicts given the current situation: no    Objective:     Communicated with: nsg prior to session.  Patient found supine in bed with no lines attached. Spouse in room.   General Precautions: Standard, fall    Occupational Performance:    Bed Mobility:    Supervision     Functional Mobility/Transfers:  Supervision     Activities of Daily Living:  Supervision basic ADL skills. Pt demo strength/balance and endurance needed for performance of basic ADL skills.   Pt reports SOB noted with bathing at home and reports techniques for activity modification which allows him to remain as independent as possible.     Cognitive/Visual Perceptual  Pt awake, alert and following commands.     Physical Exam:  Pt is right hand dominant and demo WFL UE strength/ROM, coordination and sensation.     AMPAC 6 Click ADL:  AMPAC Total Score: 19    Treatment & Education:  Pt  completed functional mobility in room and hallway with supervision approx 200 feet. SOB noted toward end of ambulation trial which subsided with seated rest break on EOB.   Pt also reports pain in legs with activity which has been happening since prior to arrival.   Pt reports pain waxes and wanes throughout activity and resolves with seated rest breaks.   Education provided re: importance of continued activity as he remains in hospital. Education provided re: OT POC including d/c of OT services.     Patient left sitting edge of bed with all lines intact, call button in reach, and spouse present    GOALS:   Multidisciplinary Problems       Occupational Therapy Goals       Not on file                    History:     Past Medical History:   Diagnosis Date    Anemia of chronic renal failure, stage 3 (moderate) 05/27/2015    Anticoagulant long-term use     Atherosclerosis of coronary artery bypass graft of native heart without angina pectoris 09/11/2012    3-27-18 [facility] Two vessel coronary artery disease.   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    Bilateral carotid artery disease 02/09/2017    Bleeding from the nose     Bleeding nose 03/21/2018    Cataract     CKD (chronic kidney disease) stage 3, GFR 30-59 ml/min 05/27/2015    Claudication of left lower extremity 09/17/2014    Colon polyp     Encounter for blood transfusion     Gastroesophageal reflux disease without esophagitis 03/19/2018    Gastrointestinal hemorrhage associated with intestinal diverticulosis 04/01/2018    Glaucoma     H/O mechanical aortic valve replacement 09/17/2014    History of gout 09/26/2012    Hyperparathyroidism due to renal insufficiency 07/27/2015    Internal hemorrhoid 04/03/2018    Long term current use of anticoagulant therapy 09/26/2012    Mechanical heart valve present     Metabolic acidosis with normal anion gap and bicarbonate losses 03/20/2018    Mixed hyperlipidemia 09/26/2012    NSTEMI (non-ST elevated  myocardial infarction) 03/21/2018    Obesity, diabetes, and hypertension syndrome 02/23/2016    PVD (peripheral vascular disease) 09/11/2012    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    Syncope 09/29/2022    Type 2 diabetes mellitus with diabetic peripheral angiopathy without gangrene 05/27/2015    Type 2 diabetes mellitus with stage 3 chronic kidney disease, without long-term current use of insulin 10/02/2013         Past Surgical History:   Procedure Laterality Date    CARDIAC CATHETERIZATION      CARDIAC VALVE REPLACEMENT      CARDIAC VALVE SURGERY      CARPAL TUNNEL RELEASE Right 05/19/2020    Procedure: RELEASE, CARPAL TUNNEL;  Surgeon: Rupesh Norris Jr., MD;  Location: [facility];  Service: Plastics;  Laterality: Right;    CATARACT EXTRACTION Left 11/13/2022        COLON SURGERY      COLONOSCOPY N/A 03/31/2017    Procedure: COLONOSCOPY;  Surgeon: Bruno Raymond MD;  Location: [facility] (4TH FLR);  Service: Endoscopy;  Laterality: N/A;  Patient's wife requesting date.    COLONOSCOPY N/A 04/03/2018    Procedure: COLONOSCOPY;  Surgeon: Bonifacio Pelletier MD;  Location: [facility] (2ND FLR);  Service: Endoscopy;  Laterality: N/A;    COLONOSCOPY N/A 08/13/2018    Procedure: COLONOSCOPY;  Surgeon: Kam Barba MD;  Location: [facility] (2ND FLR);  Service: Endoscopy;  Laterality: N/A;  2nd floor: PA pressure 49; hx of moderate-severe valve disease     per Coumadin clinic-Patient can hold 5 days with lovenox bridge       ok to schedule per Katarina    CORONARY ANGIOGRAPHY N/A 10/04/2021    Procedure: Left heart cath +/- peripheral angiogram;  Surgeon: Jose Ruiz MD;  Location: [facility] CATH LAB;  Service: Cardiology;  Laterality: N/A;    CORONARY ANGIOPLASTY      CORONARY ARTERY BYPASS GRAFT      CORONARY BYPASS GRAFT ANGIOGRAPHY  10/04/2021    Procedure: Bypass graft study;  Surgeon: Jose Ruiz MD;  Location: [facility] CATH LAB;  Service: Cardiology;;    HERNIA REPAIR      INTRAOCULAR PROSTHESES INSERTION  Left 11/13/2022    Procedure: INSERTION, IOL PROSTHESIS;  Surgeon: Alia Mckeon MD;  Location: [facility] OR [address] [place] [address];  Service: Ophthalmology;  Laterality: Left;    INTRAOCULAR PROSTHESES INSERTION Right 12/4/2022    Procedure: INSERTION, IOL PROSTHESIS;  Surgeon: Alia Mckeon MD;  Location: [facility] OR [address] [place] [address];  Service: Ophthalmology;  Laterality: Right;    PHACOEMULSIFICATION OF CATARACT Left 11/13/2022    Procedure: PHACOEMULSIFICATION, CATARACT;  Surgeon: Alia Mckeon MD;  Location: [facility] OR [address] [place] [address];  Service: Ophthalmology;  Laterality: Left;    PHACOEMULSIFICATION OF CATARACT Right 12/4/2022    Procedure: PHACOEMULSIFICATION, CATARACT;  Surgeon: Alia Mckeon MD;  Location: [facility] OR [address] [place] [address];  Service: Ophthalmology;  Laterality: Right;    SPINE SURGERY      VASECTOMY         Time Tracking:     OT Date of Treatment: 12/31/22  OT Start Time: 1340  OT Stop Time: 1350  OT Total Time (min): 10 min    Billable Minutes:Evaluation 10    12/31/2022

## 2023-01-01 NOTE — NURSING
Heparin gtt paused per MD order d/t nose bleeding. Pt stopped nose bleeding around 1110, notified MD. Huang, continue to hold heparin gtt per MD. Huang. Heparin order was discontinued at 1150.

## 2023-01-01 NOTE — DISCHARGE SUMMARY
[facility] - Cardiology [facility] Medicine  Discharge Summary      Patient Name: Dariel Urbina  MRN: [id number]  EMELIA: [id number]  Patient Class: OP- Observation  Admission Date: 12/29/2022  Hospital Length of Stay: 0 days  Discharge Date and Time:  12/31/2022 6:51 PM  Attending Physician: Maeve Huang MD   Discharging Provider: Maeve Huang MD  Primary Care Provider: Devon Langston MD  [place] [facility] Maeve Huang MD  Primary Care Team[place] [facility]    HPI:   82 y.o. male with past medical history of Melanoma, CAD s/p CABG, PCI (11/2021), mechanical aortic valve (2014) on coumadin, HFmEF and diastolic dysfunction, CVA, CKD III, HTN,  presented to the ED with dyspnea on exertion.    He was discharged from [facility] yesterday after being admitted from 12/21-12/28 due to recurrent epistaxis causing blood loss anemia requiring transfusion and also with type 2 MI myocardial injury secondary to the anemia, with 2 units PRBCs given.  During his admission troponin peaked at 2.4.  He was seen by ENT and IR consults, received nasal packing without any surgical procedures, cardiology consultation recommended goal hemoglobin greater than 8 in that acute setting.  He was discharged to continue Coumadin INR on admission was supratherapeutic and at discharge was subtherapeutic but bridging anticoagulation contraindicated with his recent bleeding.    Patient reports that while in the hospital he would ambulate within his room and out to the nurses station to get coffee but no further distances states that since discharge he has felt dyspnea with exertion he has no chest pain at rest no chest pressure.  He has not had any recurrence of epistaxis.  ED workup shows BNP 1 week ago was 300s now is 1188, patient is not on oxygen chest x-ray without any acute edema but concern is that patient has heart failure symptoms emergency department spoke with Cardiology and dosage of IV Lasix  was recommended with further cardiac workup.    On my interview patient is urinated once at least 6-700 cc states he feels okay is okay with readmission and workup of potential heart failure also discussed that while he believed transfusion was the next step ruling out acute heart failure before administering transfusion would be important to avoid harm            * No surgery found *      Hospital Course:   Patient was admitted with the diagnosis of acute on chronic combined heart failure exacerbation.  He was started on Lasix IV which was later increased to 80 mg b.i.d. by Cardiology.  He diuresed well, and felt much better today.  As a result Cardiology cleared the patient for discharge today on his home dose of bumex 1 mg daily, with instructions to weigh himself everyday. If wt increased by 3 Ibs in one day or 5 Ibs in 1 week, then he can take an extra dose of bumex 1 mg. They also recommended to discharge pt on toprol xl 25 mg, lisinopril 2.5 mg daily and to have PET scan done outpt. Writer scheduled the PET scan on 1/17 prior to pt's discharge. Discussed with cardiology, Farxiga will be initiated outpt after his cardiology follow up.    Please note that on 12/30, Pharmacy recommended to bridge pt with heparin while being on coumadin given his INR of 1.2 in the light of his h/o aortic mechanical valve. Cardiology agreed with this plan of care. Heparin drip low intensity was started as well as Coumadin.  Today, patient had mild left nostril bleed.  Packing was applied.  Heparin drip was stopped.  Nosebleed stopped soon after.  Cardiology recommended to hold bridging meds on discharge given pt's nose bleed episode this am. They recommended to discharge pt on coumadin 7.5 mg Sunday and Thursday and 5 mg all other days, which was his prior home regimen.  Patient will follow up with the anticoagulation clinic on Tuesday January 3rd for INR check to adjust his Coumadin doses as needed.    Patient's tele showed AFib  today prior to his discharge.  EKG was done which confirmed AFib.  I did contact Cardiology again prior to discharge, and per Cardiology, no further records at the current time, patient is already on Toprol for rate control, and Coumadin for anticoagulation.  He will need to see Cardiology outpatient for further management.    Given that his clinical status improved, we will discharge the patient home with follow up with Cardiology and PCP outpatient.    Physical exam  Vitals; reviewed  General:  No acute distress  HENT; NC/AT, 2*1.5 cm black lesion at the top of his head irregular in shape and appears cancerous.   CV; RRR  Resp; CTA b/l  Abdomen; soft, NT, ND, +ve BS  Extremities; traces of edema bilateral LE.          Goals of Care Treatment Preferences:  Code Status: Full Code      Consults:   Consults (From admission, onward)          Status Ordering Provider     [facility] Medicine PharmD Consult  Once        Provider:  (Not yet assigned)    Acknowledged DYLAN RIZVI                  Final Active Diagnoses:    Diagnosis Date Noted POA    PRINCIPAL PROBLEM:  Acute decompensated heart failure [I50.9] 12/30/2022 Unknown    Coronary artery disease involving native coronary artery of native heart without angina pectoris [I25.10] 12/29/2022 Yes     Chronic    H/O mechanical aortic valve replacement [Z95.2] 09/17/2014 Not Applicable    Stage 3b chronic kidney disease [N18.32] 05/27/2015 Yes     Chronic    Anemia [D64.9] 02/03/2022 Yes    Type 2 diabetes mellitus with diabetic peripheral angiopathy without gangrene [E11.51] 05/27/2015 Yes    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] 09/26/2012 Yes      Problems Resolved During this Admission:    Diagnosis Date Noted Date Resolved POA    Acute on chronic combined systolic and diastolic heart failure [I50.43] 12/31/2022 12/31/2022 Yes    Dyspnea on exertion [R06.09] 03/02/2020 12/31/2022 Yes    On warfarin therapy [Z79.01] 09/26/2012 12/31/2022 Not Applicable       Discharged  Condition: stable    Patient Instructions:   No discharge procedures on file.    Significant Diagnostic Studies: Labs:   BMP:   Recent Labs   Lab 12/29/22 1956 12/30/22 0421 12/31/22  0617   GLU 89 75 95    139 135*   K 5.3* 5.0 4.4   * 111* 105   CO2 20* 19* 22*   BUN 40* 37* 40*   CREATININE 1.8* 1.8* 2.0*   CALCIUM 10.0 9.7 10.1   MG  --  1.7 1.7   , CMP   Recent Labs   Lab 12/29/22 1956 12/30/22  0421 12/31/22  0617    139 135*   K 5.3* 5.0 4.4   * 111* 105   CO2 20* 19* 22*   GLU 89 75 95   BUN 40* 37* 40*   CREATININE 1.8* 1.8* 2.0*   CALCIUM 10.0 9.7 10.1   PROT 6.6  --   --    ALBUMIN 3.3*  --   --    BILITOT 0.5  --   --    ALKPHOS 68  --   --    AST 15  --   --    ALT 11  --   --    ANIONGAP 4* 9 8    and CBC   Recent Labs   Lab 12/30/22 0421 12/30/22  1543 12/31/22  0617   WBC 7.42 5.82 4.96  4.96   HGB 7.5* 8.0* 8.5*  8.5*   HCT 24.6* 26.8* 27.5*  27.5*    179 189  189       Pending Diagnostic Studies:       None           Medications:  Reconciled Home Medications:      Medication List        CHANGE how you take these medications      metoprolol succinate 25 MG 24 hr tablet  Commonly known as: TOPROL-XL  Take 1 tablet (25 mg total) by mouth once daily.  What changed: how much to take     warfarin 5 MG tablet  Commonly known as: COUMADIN  warfarin 7.5 (1.5 tablets) sun and Thursday, and 5mg other days  What changed:   how much to take  how to take this  when to take this  additional instructions            CONTINUE taking these medications      allopurinoL 100 MG tablet  Commonly known as: ZYLOPRIM  TAKE 1 TABLET EVERY DAY     bumetanide 1 MG tablet  Commonly known as: BUMEX  Take 1 tablet (1 mg total) by mouth once daily.     ferrous gluconate 324 MG tablet  Commonly known as: FERGON  Take 1 tablet (324 mg total) by mouth daily with breakfast.     isosorbide mononitrate 60 MG 24 hr tablet  Commonly known as: IMDUR  Take 1 tablet (60 mg total) by mouth once daily.      lisinopriL 2.5 MG tablet  Commonly known as: PRINIVIL,ZESTRIL  Take 1 tablet (2.5 mg total) by mouth once daily.     loperamide 2 mg capsule  Commonly known as: IMODIUM  Take 2 mg by mouth 4 (four) times daily as needed for Diarrhea.     NASAL DECONGESTANT (OXYMETAZL) 0.05 % nasal spray  Generic drug: oxymetazoline  Use 2 sprays by Nasal route daily as needed (nose bleed).     rosuvastatin 40 MG Tab  Commonly known as: CRESTOR  TAKE 1 TABLET EVERY EVENING.            STOP taking these medications      prednisol ace-gatiflox-bromfen 1-0.5-0.075 % Drps              Indwelling Lines/Drains at time of discharge:   Lines/Drains/Airways       None                   Time spent on the discharge of patient: 45 minutes         Maeve Huang MD  Department of Hospital Medicine  [facility] - Cardiology Stepdown

## 2023-01-03 ENCOUNTER — CLINICAL SUPPORT (OUTPATIENT)
Dept: ENDOSCOPY | Facility: HOSPITAL | Age: 83
End: 2023-01-03
Attending: INTERNAL MEDICINE
Payer: MEDICARE

## 2023-01-03 DIAGNOSIS — R19.7 DIARRHEA IN ADULT PATIENT: ICD-10-CM

## 2023-01-03 DIAGNOSIS — N18.9 CHRONIC RENAL IMPAIRMENT, UNSPECIFIED CKD STAGE: ICD-10-CM

## 2023-01-03 DIAGNOSIS — R55 SYNCOPE: ICD-10-CM

## 2023-01-03 NOTE — PROGRESS NOTES
Patient admitted 12/30/22 -12/31/22 for acute decompensated heart failure.  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  He was started on Lasix IV which was later increased to 80 mg b.i.d. by Cardiology.  He diuresed well, and felt much better today.  As a result Cardiology cleared the patient for discharge today on his home dose of bumex 1 mg daily, with instructions to weigh himself everyday. If wt increased by 3 Ibs in one day or 5 Ibs in 1 week, then he can take an extra dose of bumex 1 mg. They also recommended to discharge pt on toprol xl 25 mg, lisinopril 2.5 mg daily and to have PET scan done outpt. Writer scheduled the PET scan on 1/17 prior to pt's discharge. Discussed with cardiology, Farxiga will be initiated outpt after his cardiology follow up.     Please note that on 12/30, Pharmacy recommended to bridge pt with heparin while being on coumadin given his INR of 1.2 in the light of his h/o aortic mechanical valve. Cardiology agreed with this plan of care. Heparin drip low intensity was started as well as Coumadin.  Today, patient had mild left nostril bleed.  Packing was applied.  Heparin drip was stopped.  Nosebleed stopped soon after.  Cardiology recommended to hold bridging meds on discharge given pt's nose bleed episode this am. They recommended to discharge pt on coumadin 7.5 mg Sunday and Thursday and 5 mg all other days, which was his prior home regimen.  Patient will follow up with the anticoagulation clinic on Tuesday January 3rd for INR check to adjust his Coumadin doses as needed.        Coumadin given during admit:  12/30 10 mg, 12/31 5 mg    Coumadin discharged 7.5 mg Sundays, Thursdays and 5 mg all other days

## 2023-01-03 NOTE — PROGRESS NOTES
1/3/23-pt d/c'd 12/31.  Will r/s f/u. Pt advised to take warfarin 7.5mg Sun/Thurs x 5mg AOD (OLDER DOSING). He will likely need a dosing increase/to resume recommended dosing.

## 2023-01-03 NOTE — PHYSICIAN QUERY
PT Name: Dariel Urbina  MR #: [id number]     DOCUMENTATION CLARIFICATION     CDS: Nancy Wynne RN         Contact information:[email or web address] or (cell) [phone]     This form is a permanent document in the medical record.     Query Date: January 3, 2023    By submitting this query, we are merely seeking further clarification of documentation.  Please utilize your independent clinical judgment when addressing the question(s) below.    The Medical Record contains the following   Indicators Supporting Clinical Findings Location in Medical Record   x Heart Failure documented Acute on chronic heart failure  Does not appear to be in acute HF   H&P 12/21   x BNP  12/21/22 10:34 12/29/22 19:56    (H) 1,188 (H)      Lab Results    x EF/Echo The left ventricle is normal in size with mild concentric hypertrophy and mildly decreased systolic function.  The estimated ejection fraction is 48%.  There are segmental left ventricular wall motion abnormalities.  There is abnormal septal wall motion.  Grade III left ventricular diastolic dysfunction.  Severe left atrial enlargement.  Normal right ventricular size with normal right ventricular systolic function.  Mild right atrial enlargement.  There is a mechanical aortic valve present.  The aortic valve mean gradient is 17 mmHg with a dimensionless index of 0.38.  Moderate to moderate -severe ( 2-3+) MR  Moderate tricuspid regurgitation.  Normal central venous pressure (3 mmHg).  The estimated PA systolic pressure is 62 mmHg.  There is at least moderate pulmonary hypertension.   Echo 12/30   x Radiology findings FINDINGS:  Postoperative changes as before.  Mild cardiomegaly.  The lungs are clear.  No pleural effusion    Chest Xray 12/21    Subjective/Objective Respiratory Conditions      Recent/Current MI      Heart Transplant, LVAD      Edema, JVD      Ascites     x Diuretics/Meds furosemide injection 40 mg    Ordered Dose: 40 mg   Route:  Intravenous   Frequency: 2 times daily  Admin Dose: 40 mg    MAR    Other Treatment     x Other His heart failure was stable during hospital stay   DCS 12/28     Heart failure is a clinical diagnosis which includes symptomatic fluid retention, elevated intracardiac pressures, and/or the inability of the heart to deliver adequate blood flow.    Heart Failure with reduced Ejection Fraction (HFrEF) or Systolic Heart Failure (loses ability to contract normally, EF is <40%)    Heart Failure with preserved Ejection Fraction (HFpEF) or Diastolic Heart Failure (stiff ventricles, does not relax properly, EF is >50%)     Heart Failure with Combined Systolic and Diastolic Failure (stiff ventricles, does not relax properly and EF is <50%)    Mid-range or mildly reduced ejection fraction (HFmrEF) is classified as systolic heart failure.  Congestive heart failure with a recovered EF is classified as Diastolic Heart Failure.  Common clues to acute exacerbation:  Rapidly progressive symptoms (w/in 2 weeks of presentation), using IV diuretics, using supplemental O2, pulmonary edema on Xray, new or worsening pleural effusion, +JVD or other signs of volume overload, MI w/in 4 weeks, and/or BNP >500  The clinical guidelines noted are only system guidelines, and do not replace the providers clinical judgment.    Provider, please specify the diagnosis associated with the above clinical findings.    [   ]  Acute on Chronic Combined Systolic and Diastolic Heart Failure - worsening of CHF signs/symptoms in preexisting CHF     [  X ]  Chronic Combined Systolic and Diastolic Heart Failure - pre-existing and stable     [   ]  Other (please specify): ___________________________________     [  ]  Clinically Undetermined         References:  American Heart Association editorial staff. (2017, May). Ejection Fraction Heart Failure Measurement. American Heart Association.  https://www.heart.org/en/health-topics/heart-failure/diagnosing-heart-failure/ejection-fraction-heart-failure-measurement#:~:text=Ejection%20fraction%20(EF)%20is%20a,pushed%20out%20with%20each%20heartbeat  NITESH Dorantes (2020, December 15). Heart failure with preserved ejection fraction: Clinical manifestations and diagnosis. [employer]. https://www.[employer].ViewsIQ/contents/heart-failure-with-preserved-ejection-fraction-clinical-manifestations-and-diagnosis.  ICD-10-CM/PCS Coding Clinic Third Quarter ICD-10, Effective with discharges: September 8, 2020 [facility] § Heart failure with mid-range or mildly reduced ejection fraction (2020).  ICD-10-CM/PCS Coding Clinic Third Quarter ICD-10, Effective with discharges: September 8, 2020 [facility] § Heart failure with recovered ejection fraction (2020).  Form No. [id number]

## 2023-01-04 ENCOUNTER — ANTI-COAG VISIT (OUTPATIENT)
Dept: CARDIOLOGY | Facility: CLINIC | Age: 83
End: 2023-01-04
Payer: MEDICARE

## 2023-01-04 ENCOUNTER — LAB VISIT (OUTPATIENT)
Dept: LAB | Facility: HOSPITAL | Age: 83
End: 2023-01-04
Attending: INTERNAL MEDICINE
Payer: MEDICARE

## 2023-01-04 DIAGNOSIS — Z95.2 H/O MECHANICAL AORTIC VALVE REPLACEMENT: Primary | ICD-10-CM

## 2023-01-04 DIAGNOSIS — Z95.2 H/O MECHANICAL AORTIC VALVE REPLACEMENT: ICD-10-CM

## 2023-01-04 DIAGNOSIS — Z79.01 LONG TERM CURRENT USE OF ANTICOAGULANT THERAPY: ICD-10-CM

## 2023-01-04 LAB
INR PPP: 1.4 (ref 0.8–1.2)
PROTHROMBIN TIME: 14.1 SEC (ref 9–12.5)

## 2023-01-04 PROCEDURE — 36415 COLL VENOUS BLD VENIPUNCTURE: CPT | Mod: HCNC | Performed by: INTERNAL MEDICINE

## 2023-01-04 PROCEDURE — 93793 PR ANTICOAGULANT MGMT FOR PT TAKING WARFARIN: ICD-10-PCS | Mod: S$GLB,,,

## 2023-01-04 PROCEDURE — 85610 PROTHROMBIN TIME: CPT | Mod: HCNC | Performed by: INTERNAL MEDICINE

## 2023-01-04 PROCEDURE — 93793 ANTICOAG MGMT PT WARFARIN: CPT | Mod: S$GLB,,,

## 2023-01-04 NOTE — PROGRESS NOTES
INR not at goal. Medications, chart, and patient findings reviewed. See calendar for adjustments to dose and follow up plan.  Recommend pt resumes per calendar at this time.

## 2023-01-06 ENCOUNTER — OFFICE VISIT (OUTPATIENT)
Dept: OPTOMETRY | Facility: CLINIC | Age: 83
End: 2023-01-06
Payer: MEDICARE

## 2023-01-06 DIAGNOSIS — Z96.1 STATUS POST CATARACT EXTRACTION OF BOTH EYES WITH INSERTION OF INTRAOCULAR LENS: Primary | ICD-10-CM

## 2023-01-06 DIAGNOSIS — Z98.41 STATUS POST CATARACT EXTRACTION OF BOTH EYES WITH INSERTION OF INTRAOCULAR LENS: Primary | ICD-10-CM

## 2023-01-06 DIAGNOSIS — Z98.42 STATUS POST CATARACT EXTRACTION OF BOTH EYES WITH INSERTION OF INTRAOCULAR LENS: Primary | ICD-10-CM

## 2023-01-06 PROCEDURE — 99024 PR POST-OP FOLLOW-UP VISIT: ICD-10-PCS | Mod: HCNC,S$GLB,, | Performed by: OPTOMETRIST

## 2023-01-06 PROCEDURE — 92015 DETERMINE REFRACTIVE STATE: CPT | Mod: HCNC,S$GLB,, | Performed by: OPTOMETRIST

## 2023-01-06 PROCEDURE — 92015 PR REFRACTION: ICD-10-PCS | Mod: HCNC,S$GLB,, | Performed by: OPTOMETRIST

## 2023-01-06 PROCEDURE — 99024 POSTOP FOLLOW-UP VISIT: CPT | Mod: HCNC,S$GLB,, | Performed by: OPTOMETRIST

## 2023-01-06 NOTE — PROGRESS NOTES
HPI    1mo po  Doing well [facility] good  S/p Phaco w/IOL OS 11/13/2022   S/p Phaco w/IOL OD 12/04/2022  Glaucoma Suspect OS   ON Cupping OU   ERM OS   Type 2 DM no DR   Last edited by Chandrika Larkin, OD on 1/6/2023  9:49 AM.            Assessment /Plan     For exam results, see Encounter Report.    Status post cataract extraction of both eyes with insertion of intraocular lens      Educated pt on today's WNL findings OU. Eyes well healed from CE OU.     Eyeglass Final Rx       Eyeglass Final Rx         Sphere Cylinder Axis Add    Right [place] +1.00 180 +2.50    Left [place] +1.25 170 +2.50      Type: PAL    Expiration Date: 1/6/2024                   RTC in 1 year for annual eye exam unless changes noted sooner.

## 2023-01-09 ENCOUNTER — LAB VISIT (OUTPATIENT)
Dept: LAB | Facility: HOSPITAL | Age: 83
End: 2023-01-09
Attending: INTERNAL MEDICINE
Payer: MEDICARE

## 2023-01-09 ENCOUNTER — ANTI-COAG VISIT (OUTPATIENT)
Dept: CARDIOLOGY | Facility: CLINIC | Age: 83
End: 2023-01-09
Payer: MEDICARE

## 2023-01-09 DIAGNOSIS — I27.20 PULMONARY HYPERTENSION: ICD-10-CM

## 2023-01-09 DIAGNOSIS — D50.9 IRON DEFICIENCY ANEMIA, UNSPECIFIED IRON DEFICIENCY ANEMIA TYPE: Primary | ICD-10-CM

## 2023-01-09 DIAGNOSIS — D50.9 IRON DEFICIENCY ANEMIA, UNSPECIFIED IRON DEFICIENCY ANEMIA TYPE: ICD-10-CM

## 2023-01-09 DIAGNOSIS — Z95.2 H/O MECHANICAL AORTIC VALVE REPLACEMENT: Primary | ICD-10-CM

## 2023-01-09 DIAGNOSIS — Z86.010 HISTORY OF ADENOMATOUS POLYP OF COLON: ICD-10-CM

## 2023-01-09 DIAGNOSIS — Z79.01 LONG TERM CURRENT USE OF ANTICOAGULANT THERAPY: ICD-10-CM

## 2023-01-09 DIAGNOSIS — Z95.2 H/O MECHANICAL AORTIC VALVE REPLACEMENT: ICD-10-CM

## 2023-01-09 LAB
FERRITIN SERPL-MCNC: 71 NG/ML (ref 20–300)
HGB BLD-MCNC: 8.8 G/DL (ref 14–18)
INR PPP: 1.7 (ref 0.8–1.2)
IRON SERPL-MCNC: 99 UG/DL (ref 45–160)
PROTHROMBIN TIME: 17.2 SEC (ref 9–12.5)
SATURATED IRON: 31 % (ref 20–50)
TOTAL IRON BINDING CAPACITY: 324 UG/DL (ref 250–450)
TRANSFERRIN SERPL-MCNC: 219 MG/DL (ref 200–375)

## 2023-01-09 PROCEDURE — 36415 COLL VENOUS BLD VENIPUNCTURE: CPT | Mod: HCNC | Performed by: INTERNAL MEDICINE

## 2023-01-09 PROCEDURE — 84466 ASSAY OF TRANSFERRIN: CPT | Mod: HCNC | Performed by: INTERNAL MEDICINE

## 2023-01-09 PROCEDURE — 85018 HEMOGLOBIN: CPT | Mod: HCNC | Performed by: INTERNAL MEDICINE

## 2023-01-09 PROCEDURE — 85610 PROTHROMBIN TIME: CPT | Mod: HCNC | Performed by: INTERNAL MEDICINE

## 2023-01-09 PROCEDURE — 93793 ANTICOAG MGMT PT WARFARIN: CPT | Mod: S$GLB,,,

## 2023-01-09 PROCEDURE — 82728 ASSAY OF FERRITIN: CPT | Mod: HCNC | Performed by: INTERNAL MEDICINE

## 2023-01-09 PROCEDURE — 93793 PR ANTICOAGULANT MGMT FOR PT TAKING WARFARIN: ICD-10-PCS | Mod: S$GLB,,,

## 2023-01-09 NOTE — PROGRESS NOTES
Dariel your lab work shows your still anemic likely iron deficient based on your prior lab work a looked at your prior colonoscopy it was an adenoma in light of your anemia in your prior colon adenoma recommend EGD colonoscopy for further evaluation 2nd floor for cardiovascular protection referral to endoscopy schedulers placed.    Yusuf can you give him an endoscopy schedulers appointment.  Thank you

## 2023-01-09 NOTE — PROGRESS NOTES
INR not at goal. Medications, chart, and patient findings reviewed. See calendar for adjustments to dose and follow up plan.  Pt likely remains subtherapeutic as he continues to take a lower dose than recommended.  Will decrease maintenance dose per calendar and re-assess.

## 2023-01-12 NOTE — PHYSICIAN QUERY
PT Name: Dariel Urbina  MR #: [id number]    DOCUMENTATION CLARIFICATION     CDS: Nancy Wynne RN         Contact information:[email or web address] or (cell) [phone]     This form is a permanent document in the medical record.     Query Date: January 12, 2023    By submitting this query, we are merely seeking further clarification of documentation. Please utilize your independent clinical judgment when addressing the question(s) below.    Supporting Clinical Findings Location in Medical Record   82 y.o. male with past medical history of Melanoma, CAD s/p CABG, PCI (11/2021), mechanical aortic valve (2014) on coumadin, HFmEF and diastolic dysfunction, CVA, CKD III, HTN,  presented to the ED with chest pain and epistaxis. Patient reports long-standing history of recurrent epistaxis, more so in colder climate and has had several cauterizations with ENT. He was found to have supratherapeutic INR and his warfarin was held.      Collective decision was made to hold antiplatelets for now.  Risks and benefits were discussed in details and patient was restarted on Coumadin however at a lower dose of 5 mg daily.  His INR on day of discharge was 1.2. Collective decision was made to continue warfarin and follow up with INR clinic tomorrow at which point in time to warfarin will likely start to have an effect, No bridging anticoagulation at this time as he just recovered from significant epistaxis   DCS 12/28       Provider, please clarify if there is any clinical correlation between Epistaxis and Anticoagulants.           Are the conditions:      [  ] Due to or associated with each other     [  ] Unrelated to each other     [ X ] Other explanation (Please Specify): ____________Anticoagulation puts him at further risk for epistaxis__     [  ] Clinically Undetermined

## 2023-01-13 ENCOUNTER — TELEPHONE (OUTPATIENT)
Dept: CARDIOLOGY | Facility: HOSPITAL | Age: 83
End: 2023-01-13
Payer: MEDICARE

## 2023-01-17 ENCOUNTER — HOSPITAL ENCOUNTER (OUTPATIENT)
Dept: CARDIOLOGY | Facility: HOSPITAL | Age: 83
Discharge: HOME | End: 2023-01-17
Attending: HOSPITALIST
Payer: MEDICARE

## 2023-01-17 ENCOUNTER — ANTI-COAG VISIT (OUTPATIENT)
Dept: CARDIOLOGY | Facility: CLINIC | Age: 83
End: 2023-01-17
Payer: MEDICARE

## 2023-01-17 VITALS
WEIGHT: 165 LBS | BODY MASS INDEX: 27.49 KG/M2 | RESPIRATION RATE: 16 BRPM | DIASTOLIC BLOOD PRESSURE: 43 MMHG | HEIGHT: 65 IN | HEART RATE: 65 BPM | SYSTOLIC BLOOD PRESSURE: 112 MMHG

## 2023-01-17 DIAGNOSIS — Z95.2 H/O MECHANICAL AORTIC VALVE REPLACEMENT: Primary | ICD-10-CM

## 2023-01-17 DIAGNOSIS — I21.4 NSTEMI (NON-ST ELEVATED MYOCARDIAL INFARCTION): ICD-10-CM

## 2023-01-17 LAB
CFR FLOW - ANTERIOR: 1.35
CFR FLOW - INFERIOR: 1.08
CFR FLOW - LATERAL: 1.05
CFR FLOW - MAX: 1.78
CFR FLOW - MIN: 0.75
CFR FLOW - SEPTAL: 1.43
CFR FLOW - WHOLE HEART: 1.23
CFR FLOW- DEFECT 1: 0.95
CV STRESS BASE HR: 68 BPM
DIASTOLIC BLOOD PRESSURE: 51 MMHG
EJECTION FRACTION- HIGH: 59 %
END DIASTOLIC INDEX-HIGH: 155 ML/M2
END DIASTOLIC INDEX-LOW: 91 ML/M2
END SYSTOLIC INDEX-HIGH: 78 ML/M2
END SYSTOLIC INDEX-LOW: 40 ML/M2
NUC REST DIASTOLIC VOLUME INDEX: 156
NUC REST EJECTION FRACTION: 46
NUC REST SYSTOLIC VOLUME INDEX: 84
NUC STRESS DIASTOLIC VOLUME INDEX: 156
NUC STRESS EJECTION FRACTION: 46 %
NUC STRESS SYSTOLIC VOLUME INDEX: 84
OHS CV CPX 1 MINUTE RECOVERY HEART RATE: 80 BPM
OHS CV CPX 85 PERCENT MAX PREDICTED HEART RATE MALE: 118
OHS CV CPX MAX PREDICTED HEART RATE: 139
OHS CV CPX PATIENT IS FEMALE: 0
OHS CV CPX PATIENT IS MALE: 1
OHS CV CPX PEAK DIASTOLIC BLOOD PRESSURE: 52 MMHG
OHS CV CPX PEAK HEAR RATE: 64 BPM
OHS CV CPX PEAK RATE PRESSURE PRODUCT: 7552
OHS CV CPX PEAK SYSTOLIC BLOOD PRESSURE: 118 MMHG
OHS CV CPX PERCENT MAX PREDICTED HEART RATE ACHIEVED: 46
OHS CV CPX RATE PRESSURE PRODUCT PRESENTING: 9316
PERFUSION DEFECT 1 SIZE IN %: 3 %
PERFUSION DEFECT SIZE WORSENS % 1: 16 %
REST FLOW - ANTERIOR: 1.13 CC/MIN/G
REST FLOW - INFERIOR: 0.72 CC/MIN/G
REST FLOW - LATERAL: 0.87 CC/MIN/G
REST FLOW - MAX: 1.4 CC/MIN/G
REST FLOW - MIN: 0.41 CC/MIN/G
REST FLOW - SEPTAL: 0.83 CC/MIN/G
REST FLOW - WHOLE HEART: 0.89 CC/MIN/G
REST FLOW- DEFECT 1: 0.61 CC/MIN/G
RETIRED EF AND QEF - SEE NOTES: 47 %
STRESS FLOW - ANTERIOR: 1.5 CC/MIN/G
STRESS FLOW - INFERIOR: 0.79 CC/MIN/G
STRESS FLOW - LATERAL: 0.94 CC/MIN/G
STRESS FLOW - MAX: 1.77 CC/MIN/G
STRESS FLOW - MIN: 0.4 CC/MIN/G
STRESS FLOW - SEPTAL: 1.18 CC/MIN/G
STRESS FLOW - WHOLE HEART: 1.1 CC/MIN/G
STRESS FLOW- DEFECT 1: 0.58 CC/MIN/G
SYSTOLIC BLOOD PRESSURE: 137 MMHG

## 2023-01-17 PROCEDURE — 93016 CARDIAC PET SCAN STRESS (CUPID ONLY): ICD-10-PCS | Mod: HCNC,,, | Performed by: INTERNAL MEDICINE

## 2023-01-17 PROCEDURE — 93018 CARDIAC PET SCAN STRESS (CUPID ONLY): ICD-10-PCS | Mod: HCNC,,, | Performed by: INTERNAL MEDICINE

## 2023-01-17 PROCEDURE — 78434 AQMBF PET REST & RX STRESS: CPT | Mod: HCNC

## 2023-01-17 PROCEDURE — 63600175 PHARM REV CODE 636 W HCPCS: Mod: HCNC | Performed by: HOSPITALIST

## 2023-01-17 PROCEDURE — 78431 MYOCRD IMG PET RST&STRS CT: CPT | Mod: HCNC

## 2023-01-17 PROCEDURE — 93016 CV STRESS TEST SUPVJ ONLY: CPT | Mod: HCNC,,, | Performed by: INTERNAL MEDICINE

## 2023-01-17 PROCEDURE — 93793 PR ANTICOAGULANT MGMT FOR PT TAKING WARFARIN: ICD-10-PCS | Mod: S$GLB,,,

## 2023-01-17 PROCEDURE — 78431 CARDIAC PET SCAN STRESS (CUPID ONLY): ICD-10-PCS | Mod: 26,HCNC,, | Performed by: INTERNAL MEDICINE

## 2023-01-17 PROCEDURE — 78431 MYOCRD IMG PET RST&STRS CT: CPT | Mod: 26,HCNC,, | Performed by: INTERNAL MEDICINE

## 2023-01-17 PROCEDURE — 93793 ANTICOAG MGMT PT WARFARIN: CPT | Mod: S$GLB,,,

## 2023-01-17 PROCEDURE — 78434 AQMBF PET REST & RX STRESS: CPT | Mod: 26,HCNC,, | Performed by: INTERNAL MEDICINE

## 2023-01-17 PROCEDURE — 78434 CARDIAC PET SCAN STRESS (CUPID ONLY): ICD-10-PCS | Mod: 26,HCNC,, | Performed by: INTERNAL MEDICINE

## 2023-01-17 PROCEDURE — 93018 CV STRESS TEST I&R ONLY: CPT | Mod: HCNC,,, | Performed by: INTERNAL MEDICINE

## 2023-01-17 RX ORDER — CAFFEINE CITRATE 20 MG/ML
60 SOLUTION INTRAVENOUS ONCE
Status: COMPLETED | OUTPATIENT
Start: 2023-01-17 | End: 2023-01-17

## 2023-01-17 RX ORDER — REGADENOSON 0.08 MG/ML
0.4 INJECTION, SOLUTION INTRAVENOUS ONCE
Status: COMPLETED | OUTPATIENT
Start: 2023-01-17 | End: 2023-01-17

## 2023-01-17 RX ADMIN — REGADENOSON 0.4 MG: 0.08 INJECTION, SOLUTION INTRAVENOUS at 01:01

## 2023-01-17 RX ADMIN — CAFFEINE CITRATE 60 MG: 20 INJECTION INTRAVENOUS at 01:01

## 2023-01-24 ENCOUNTER — OUTPATIENT CASE MANAGEMENT (OUTPATIENT)
Dept: ADMINISTRATIVE | Facility: OTHER | Age: 83
End: 2023-01-24
Payer: MEDICARE

## 2023-01-24 NOTE — PROGRESS NOTES
Outpatient Care Management  Patient Does Not Consent    Patient: Dariel Urbina  MRN:  [id number]  Date of Service:  1/24/2023  Completed by:  Michaela Tang RN    Chief Complaint   Patient presents with    OPCM Chart Review     1/24/2023       Patient Summary     Program:  OPCM High Risk     Consent Received:  Decline  Decline Reason:  Needs met

## 2023-01-25 ENCOUNTER — LAB VISIT (OUTPATIENT)
Dept: LAB | Facility: HOSPITAL | Age: 83
End: 2023-01-25
Attending: INTERNAL MEDICINE
Payer: MEDICARE

## 2023-01-25 ENCOUNTER — OFFICE VISIT (OUTPATIENT)
Dept: CARDIOLOGY | Facility: CLINIC | Age: 83
End: 2023-01-25
Payer: MEDICARE

## 2023-01-25 ENCOUNTER — ANTI-COAG VISIT (OUTPATIENT)
Dept: CARDIOLOGY | Facility: CLINIC | Age: 83
End: 2023-01-25
Payer: MEDICARE

## 2023-01-25 DIAGNOSIS — Z79.01 LONG TERM CURRENT USE OF ANTICOAGULANT THERAPY: ICD-10-CM

## 2023-01-25 DIAGNOSIS — Z95.2 H/O MECHANICAL AORTIC VALVE REPLACEMENT: Primary | ICD-10-CM

## 2023-01-25 DIAGNOSIS — E78.5 HYPERLIPIDEMIA ASSOCIATED WITH TYPE 2 DIABETES MELLITUS: ICD-10-CM

## 2023-01-25 DIAGNOSIS — I50.42 CHRONIC COMBINED SYSTOLIC AND DIASTOLIC HEART FAILURE: ICD-10-CM

## 2023-01-25 DIAGNOSIS — I15.0 RENOVASCULAR HYPERTENSION: ICD-10-CM

## 2023-01-25 DIAGNOSIS — E11.69 HYPERLIPIDEMIA ASSOCIATED WITH TYPE 2 DIABETES MELLITUS: ICD-10-CM

## 2023-01-25 DIAGNOSIS — Z95.2 H/O MECHANICAL AORTIC VALVE REPLACEMENT: ICD-10-CM

## 2023-01-25 DIAGNOSIS — D64.9 ANEMIA, UNSPECIFIED TYPE: ICD-10-CM

## 2023-01-25 DIAGNOSIS — I25.10 CORONARY ARTERY DISEASE INVOLVING NATIVE CORONARY ARTERY OF NATIVE HEART WITHOUT ANGINA PECTORIS: Chronic | ICD-10-CM

## 2023-01-25 DIAGNOSIS — I70.219 ATHEROSCLEROSIS OF LOWER EXTREMITY WITH CLAUDICATION: ICD-10-CM

## 2023-01-25 DIAGNOSIS — I73.9 CLAUDICATION IN PERIPHERAL VASCULAR DISEASE: Primary | ICD-10-CM

## 2023-01-25 DIAGNOSIS — I21.4 NSTEMI (NON-ST ELEVATED MYOCARDIAL INFARCTION): ICD-10-CM

## 2023-01-25 DIAGNOSIS — Z79.01 ANTICOAGULANT LONG-TERM USE: ICD-10-CM

## 2023-01-25 DIAGNOSIS — I70.213 ATHEROSCLEROSIS OF NATIVE ARTERY OF BOTH LOWER EXTREMITIES WITH INTERMITTENT CLAUDICATION: ICD-10-CM

## 2023-01-25 DIAGNOSIS — N18.32 STAGE 3B CHRONIC KIDNEY DISEASE: Chronic | ICD-10-CM

## 2023-01-25 LAB
INR PPP: 3 (ref 0.8–1.2)
PROTHROMBIN TIME: 29.3 SEC (ref 9–12.5)

## 2023-01-25 PROCEDURE — 3078F PR MOST RECENT DIASTOLIC BLOOD PRESSURE < 80 MM HG: ICD-10-PCS | Mod: HCNC,CPTII,S$GLB, | Performed by: INTERNAL MEDICINE

## 2023-01-25 PROCEDURE — 85610 PROTHROMBIN TIME: CPT | Mod: HCNC | Performed by: INTERNAL MEDICINE

## 2023-01-25 PROCEDURE — 3078F DIAST BP <80 MM HG: CPT | Mod: HCNC,CPTII,S$GLB, | Performed by: INTERNAL MEDICINE

## 2023-01-25 PROCEDURE — 3288F FALL RISK ASSESSMENT DOCD: CPT | Mod: HCNC,CPTII,S$GLB, | Performed by: INTERNAL MEDICINE

## 2023-01-25 PROCEDURE — 1126F AMNT PAIN NOTED NONE PRSNT: CPT | Mod: HCNC,CPTII,S$GLB, | Performed by: INTERNAL MEDICINE

## 2023-01-25 PROCEDURE — 99215 OFFICE O/P EST HI 40 MIN: CPT | Mod: HCNC,S$GLB,, | Performed by: INTERNAL MEDICINE

## 2023-01-25 PROCEDURE — 36415 COLL VENOUS BLD VENIPUNCTURE: CPT | Mod: HCNC | Performed by: INTERNAL MEDICINE

## 2023-01-25 PROCEDURE — 99999 PR PBB SHADOW E&M-EST. PATIENT-LVL III: ICD-10-PCS | Mod: PBBFAC,HCNC,, | Performed by: INTERNAL MEDICINE

## 2023-01-25 PROCEDURE — 3075F PR MOST RECENT SYSTOLIC BLOOD PRESS GE 130-139MM HG: ICD-10-PCS | Mod: HCNC,CPTII,S$GLB, | Performed by: INTERNAL MEDICINE

## 2023-01-25 PROCEDURE — 1126F PR PAIN SEVERITY QUANTIFIED, NO PAIN PRESENT: ICD-10-PCS | Mod: HCNC,CPTII,S$GLB, | Performed by: INTERNAL MEDICINE

## 2023-01-25 PROCEDURE — 3288F PR FALLS RISK ASSESSMENT DOCUMENTED: ICD-10-PCS | Mod: HCNC,CPTII,S$GLB, | Performed by: INTERNAL MEDICINE

## 2023-01-25 PROCEDURE — 1101F PR PT FALLS ASSESS DOC 0-1 FALLS W/OUT INJ PAST YR: ICD-10-PCS | Mod: HCNC,CPTII,S$GLB, | Performed by: INTERNAL MEDICINE

## 2023-01-25 PROCEDURE — 3072F PR LOW RISK FOR RETINOPATHY: ICD-10-PCS | Mod: HCNC,CPTII,S$GLB, | Performed by: INTERNAL MEDICINE

## 2023-01-25 PROCEDURE — 3075F SYST BP GE 130 - 139MM HG: CPT | Mod: HCNC,CPTII,S$GLB, | Performed by: INTERNAL MEDICINE

## 2023-01-25 PROCEDURE — 1111F PR DISCHARGE MEDS RECONCILED W/ CURRENT OUTPATIENT MED LIST: ICD-10-PCS | Mod: HCNC,CPTII,S$GLB, | Performed by: INTERNAL MEDICINE

## 2023-01-25 PROCEDURE — 99215 PR OFFICE/OUTPT VISIT, EST, LEVL V, 40-54 MIN: ICD-10-PCS | Mod: HCNC,S$GLB,, | Performed by: INTERNAL MEDICINE

## 2023-01-25 PROCEDURE — 1111F DSCHRG MED/CURRENT MED MERGE: CPT | Mod: HCNC,CPTII,S$GLB, | Performed by: INTERNAL MEDICINE

## 2023-01-25 PROCEDURE — 1101F PT FALLS ASSESS-DOCD LE1/YR: CPT | Mod: HCNC,CPTII,S$GLB, | Performed by: INTERNAL MEDICINE

## 2023-01-25 PROCEDURE — 3072F LOW RISK FOR RETINOPATHY: CPT | Mod: HCNC,CPTII,S$GLB, | Performed by: INTERNAL MEDICINE

## 2023-01-25 PROCEDURE — 99999 PR PBB SHADOW E&M-EST. PATIENT-LVL III: CPT | Mod: PBBFAC,HCNC,, | Performed by: INTERNAL MEDICINE

## 2023-01-25 NOTE — PROGRESS NOTES
Subjective:   Patient ID:  Dariel Urbina is a 82 y.o. male who presents for  of Peripheral Arterial Disease, Claudication, Valvular Heart Disease, Hypertension, Hyperlipidemia, and Congestive Heart Failure        HPI:       82-year-old male who is here by recommendation of his care team to establish cardiovascular care.  He has a past medical history of coronary disease status post CABG, mechanical valve in aortic position, hypertension, hyperlipidemia, chronic kidney disease stage 4, chronic anticoagulation with Coumadin, peripheral arterial disease with evon IIb claudication, epistaxis, and congestive heart failure with ejection fraction 48% + grade 3 diastolic dysfunction.      After multivessel PCI in November 2021 the decision was made to proceed with peripheral revascularization using CO2 angiography, extravascular ultrasound, intravascular ultrasound for claudication. However this procedure was delayed by many other medical problems among others anemia and recurrent epistaxis. He is not taking any anti-platelet agents. He has claudication that occurs with ambulation of less than 50 ft. No CLI. No ALI.     Labs 12/2022: Cr 1.8 gfr 37       Echo 3/2020     EF 45% + grade II DD   Mechanical valve in aortic position    Functioning well   Mild AI/MR   PASP 50 mmHg    AAA 3.11 (US 9/2020)     POOL 3/2021     R 0.89 to 0.39 after exercise    L 1.06 to 0.77 after exercise     Angiogram 9/2021          POOL 10/2022     R 0.97 to 0.47 after 50 toe raises   L 1.15 to 0.94 after 50 toe raises    Arterial US 10/2022    Normal resting POOL's bilaterally.  Severe right CFA stenosis.  Moderate left SFA stenosis.  Moderate right PFA stenosis.  There are bilateral segmental PTA occlusions.       Echo 12/2022    The left ventricle is normal in size with mild concentric hypertrophy and mildly decreased systolic function.  The estimated ejection fraction is 48%.  There are segmental left ventricular wall motion  abnormalities.  There is abnormal septal wall motion.  Grade III left ventricular diastolic dysfunction.  Severe left atrial enlargement.  Normal right ventricular size with normal right ventricular systolic function.  Mild right atrial enlargement.  There is a mechanical aortic valve present.  The aortic valve mean gradient is 17 mmHg with a dimensionless index of 0.38.  Moderate to moderate -severe ( 2-3+) MR  Moderate tricuspid regurgitation.  Normal central venous pressure (3 mmHg).  The estimated PA systolic pressure is 62 mmHg.  There is at least moderate pulmonary hypertension.      Patient Active Problem List    Diagnosis Date Noted    Acute decompensated heart failure 12/30/2022    Coronary artery disease involving native coronary artery of native heart without angina pectoris 12/29/2022    Epistaxis 12/21/2022    Syncope 09/29/2022    Chronic kidney disease (CKD), stage IV (severe) 02/10/2022    Weakness 02/03/2022    Anemia 02/03/2022    Thrombocytopenia 11/10/2021    Atherosclerosis of native coronary artery of native heart without angina pectoris 09/29/2021    Severe carpal tunnel syndrome of right wrist 05/19/2020    Bilateral carpal tunnel syndrome 04/30/2020    Numbness and tingling in both hands 04/30/2020    Upper extremity weakness 09/25/2019    Hypertension associated with diabetes 09/25/2019    Hx of colonic polyp 07/28/2018    Gastrointestinal hemorrhage associated with intestinal diverticulosis 04/01/2018     4-3-18 C-scope  - Hemorrhoids found on perianal exam.   - Blood in the rectum and in the sigmoid colon.  - Patent end-to-end colo-colonic anastomosis,  characterized by healthy appearing mucosa.  - Diverticulosis in the sigmoid colon.   - Diverticulosis in the sigmoid colon. There was  active bleeding coming from the diverticular      opening. Clip (MR conditional) was placed.   - One 4 mm polyp in the descending colon. Resection   not attempted.   - One 10 mm polyp at the splenic  flexure. Resection  not attempted. Tattooed.      NSTEMI (non-ST elevated myocardial infarction) 2018    Acute anterior epistaxis 2018    Metabolic acidosis with normal anion gap and bicarbonate losses 2018    Pulmonary heart disease 2018     Noted on imaging 5/3/2017.      Diarrhea 2017    Bilateral carotid artery disease 2017    Hyperparathyroidism due to renal insufficiency 2015    Stage 3b chronic kidney disease 2015    Type 2 diabetes mellitus with diabetic peripheral angiopathy without gangrene 2015    H/O mechanical aortic valve replacement 2014    Atherosclerosis of native artery of extremity with intermittent claudication 2014    Hyperlipidemia associated with type 2 diabetes mellitus 2012    History of colon resection 2012    Renovascular hypertension 2012    History of gout 2012    Atherosclerosis of lower extremity with claudication 2012           Right Arm BP - Sittin/59  Left Arm BP - Sittin/69        LABS    LAST HbA1c  Lab Results   Component Value Date    HGBA1C 5.4 10/03/2022       Lipid panel  Lab Results   Component Value Date    CHOL 103 (L) 02/10/2022    CHOL 152 2021    CHOL 150 2020     Lab Results   Component Value Date    HDL 34 (L) 02/10/2022    HDL 34 (L) 2021    HDL 38 (L) 2020     Lab Results   Component Value Date    LDLCALC 43.4 (L) 02/10/2022    LDLCALC 70.4 2021    LDLCALC 70.0 2020     Lab Results   Component Value Date    TRIG 128 02/10/2022    TRIG 238 (H) 2021    TRIG 210 (H) 2020     Lab Results   Component Value Date    CHOLHDL 33.0 02/10/2022    CHOLHDL 22.4 2021    CHOLHDL 25.3 2020            Review of Systems   Constitutional: Negative for diaphoresis, night sweats, weight gain and weight loss.   HENT:  Negative for congestion.    Eyes:  Negative for blurred vision, discharge and double vision.    Cardiovascular:  Positive for claudication. Negative for chest pain, cyanosis, dyspnea on exertion, irregular heartbeat, leg swelling, near-syncope, orthopnea, palpitations, paroxysmal nocturnal dyspnea and syncope.   Respiratory:  Positive for shortness of breath. Negative for cough and wheezing.    Endocrine: Negative for cold intolerance, heat intolerance and polyphagia.   Hematologic/Lymphatic: Negative for adenopathy and bleeding problem. Does not bruise/bleed easily.   Skin:  Negative for dry skin and nail changes.   Musculoskeletal:  Negative for arthritis, back pain, falls, joint pain, myalgias and neck pain.   Gastrointestinal:  Negative for bloating, abdominal pain, change in bowel habit and constipation.   Genitourinary:  Negative for bladder incontinence, dysuria, flank pain, genital sores and missed menses.   Neurological:  Negative for aphonia, brief paralysis, difficulty with concentration, dizziness and weakness.   Psychiatric/Behavioral:  Negative for altered mental status and memory loss. The patient does not have insomnia.    Allergic/Immunologic: Negative for environmental allergies.     Objective:   Physical Exam  Constitutional:       Appearance: He is well-developed.      Interventions: He is not intubated.  HENT:      Head: Normocephalic and atraumatic.      Right Ear: External ear normal.      Left Ear: External ear normal.   Eyes:      General: No scleral icterus.        Right eye: No discharge.         Left eye: No discharge.      Conjunctiva/sclera: Conjunctivae normal.      Pupils: Pupils are equal, round, and reactive to light.   Neck:      Thyroid: No thyromegaly.      Vascular: Normal carotid pulses. No carotid bruit, hepatojugular reflux or JVD.      Trachea: No tracheal deviation.   Cardiovascular:      Rate and Rhythm: Normal rate and regular rhythm. No extrasystoles are present.     Chest Wall: PMI is not displaced.      Pulses:           Carotid pulses are 2+ on the right side  with bruit and 2+ on the left side with bruit.       Radial pulses are 1+ on the right side and 1+ on the left side.        Femoral pulses are 1+ on the right side and 1+ on the left side.       Popliteal pulses are 0 on the right side and 0 on the left side.        Dorsalis pedis pulses are 0 on the right side and 0 on the left side.        Posterior tibial pulses are 0 on the right side and 0 on the left side.      Heart sounds: S1 normal and S2 normal. Heart sounds not distant. No midsystolic click. Murmur heard.   Systolic murmur is present.     Crisp metallic sound in aortic position        No friction rub. No gallop. No S3 sounds.      Comments:     Biphasic bilateral DP doppler signals       Monophasic bilateral PT doppler signals         Pulmonary:      Effort: Pulmonary effort is normal. No tachypnea, bradypnea, accessory muscle usage or respiratory distress. He is not intubated.      Breath sounds: Normal breath sounds. No stridor. No decreased breath sounds, wheezing or rales.   Chest:      Chest wall: No tenderness.   Abdominal:      General: There is no distension or abdominal bruit.      Palpations: There is no mass or pulsatile mass.      Tenderness: There is no abdominal tenderness. There is no guarding or rebound.   Musculoskeletal:         General: No tenderness. Normal range of motion.      Cervical back: Normal range of motion and neck supple.   Lymphadenopathy:      Cervical: No cervical adenopathy.   Skin:     General: Skin is warm.      Coloration: Skin is not pale.      Findings: No erythema or rash.   Neurological:      Mental Status: He is alert and oriented to person, place, and time.      Cranial Nerves: No cranial nerve deficit.      Coordination: Coordination normal.      Deep Tendon Reflexes: Reflexes are normal and symmetric.   Psychiatric:         Behavior: Behavior normal.         Thought Content: Thought content normal.         Judgment: Judgment normal.       Assessment:     1.  Claudication in peripheral vascular disease    2. NSTEMI (non-ST elevated myocardial infarction)    3. Atherosclerosis of lower extremity with claudication    4. Coronary artery disease involving native coronary artery of native heart without angina pectoris    5. Hyperlipidemia associated with type 2 diabetes mellitus    6. Renovascular hypertension    7. H/O mechanical aortic valve replacement    8. Atherosclerosis of native artery of both lower extremities with intermittent claudication    9. Stage 3b chronic kidney disease    10. Anemia, unspecified type        Plan:         We had a long discussion regarding peripheral revascularization and the need for a minimum of a single antiplatelet agent.  In view of recurrent epistaxis with all decided to hold on any revascularization at this time. He has claudication but no CLI or ALI. He will continue with statin therapy.  He can not tolerate Pletal in view of congestive heart failure.  He be evaluated for candidacy of mitral clip in view of moderate to severe mitral regurgitation.  Continue with Coumadin for mechanical aortic valve.  Low-salt diet.  Exercise as tolerated.  Peripheral arterial disease rehabilitation if tolerated.  Cardiac rehabilitation. Patient and wife expressed verbal understanding of the plan.  All the questions were answered to their satisfaction.      GDMT for CAD + CHF as tolerated      Continue with current medical plan and lifestyle changes.  Return sooner for concerns or questions. If symptoms persist go to the ED  I have reviewed all pertinent data on this patient       I have reviewed the patient's medical history in detail and updated the computerized patient record.    Orders Placed This Encounter   Procedures    Peripheral Vascular Rehabilitation     Standing Status:   Future     Standing Expiration Date:   1/26/2024     Order Specific Question:   Department     Answer:   [facility] CARDIAC REHAB     Order Specific Question:   Select  qualifying diagnosis:     Answer:   I70.213 - Atherosclerosis of native arteries of extremities with intermittent claudication, bilateral legs    Cardiac Rehab Phase II     Standing Status:   Future     Standing Expiration Date:   1/26/2024     Order Specific Question:   Department     Answer:   [facility]     Order Specific Question:   Select qualifying diagnosis:     Answer:   I21.4 - Non-ST elevation (NSTEMI) myocardial infarction    Ankle Brachial Indices (POOL)     Standing Status:   Future     Standing Expiration Date:   1/26/2024     Order Specific Question:   Exam Type:     Answer:   Resting with toe tracings     Order Specific Question:   Release to patient     Answer:   Immediate       Follow up as scheduled. Return sooner for concerns or questions            He expressed verbal understanding and agreed with the plan        Patient's Medications   New Prescriptions    No medications on file   Previous Medications    ALLOPURINOL (ZYLOPRIM) 100 MG TABLET    TAKE 1 TABLET EVERY DAY    BUMETANIDE (BUMEX) 1 MG TABLET    Take 1 tablet (1 mg total) by mouth once daily.    FERROUS GLUCONATE (FERGON) 324 MG TABLET    Take 1 tablet (324 mg total) by mouth daily with breakfast.    ISOSORBIDE MONONITRATE (IMDUR) 60 MG 24 HR TABLET    Take 1 tablet (60 mg total) by mouth once daily.    LISINOPRIL (PRINIVIL,ZESTRIL) 2.5 MG TABLET    Take 1 tablet (2.5 mg total) by mouth once daily.    LOPERAMIDE (IMODIUM) 2 MG CAPSULE    Take 2 mg by mouth 4 (four) times daily as needed for Diarrhea.    METOPROLOL SUCCINATE (TOPROL-XL) 25 MG 24 HR TABLET    Take 1 tablet (25 mg total) by mouth once daily.    OXYMETAZOLINE (AFRIN) 0.05 % NASAL SPRAY    Use 2 sprays by Nasal route daily as needed (nose bleed).    ROSUVASTATIN (CRESTOR) 40 MG TAB    TAKE 1 TABLET EVERY EVENING.    WARFARIN (COUMADIN) 5 MG TABLET    warfarin 7.5 (1.5 tablets) sun and Thursday, and 5mg other days   Modified Medications    No medications on file    Discontinued Medications    No medications on file

## 2023-01-26 VITALS
DIASTOLIC BLOOD PRESSURE: 59 MMHG | SYSTOLIC BLOOD PRESSURE: 132 MMHG | WEIGHT: 174 LBS | BODY MASS INDEX: 28.99 KG/M2 | HEIGHT: 65 IN | HEART RATE: 61 BPM

## 2023-01-26 DIAGNOSIS — Z95.2 H/O MECHANICAL AORTIC VALVE REPLACEMENT: Primary | ICD-10-CM

## 2023-01-26 PROBLEM — I50.42 CHRONIC COMBINED SYSTOLIC AND DIASTOLIC HEART FAILURE: Status: ACTIVE | Noted: 2022-12-31

## 2023-02-01 ENCOUNTER — PROCEDURE VISIT (OUTPATIENT)
Dept: DERMATOLOGY | Facility: CLINIC | Age: 83
End: 2023-02-01
Payer: MEDICARE

## 2023-02-01 ENCOUNTER — LAB VISIT (OUTPATIENT)
Dept: LAB | Facility: HOSPITAL | Age: 83
End: 2023-02-01
Attending: INTERNAL MEDICINE
Payer: MEDICARE

## 2023-02-01 ENCOUNTER — ANTI-COAG VISIT (OUTPATIENT)
Dept: CARDIOLOGY | Facility: CLINIC | Age: 83
End: 2023-02-01
Payer: MEDICARE

## 2023-02-01 VITALS
HEIGHT: 65 IN | WEIGHT: 174 LBS | DIASTOLIC BLOOD PRESSURE: 71 MMHG | BODY MASS INDEX: 28.99 KG/M2 | HEART RATE: 73 BPM | SYSTOLIC BLOOD PRESSURE: 160 MMHG

## 2023-02-01 DIAGNOSIS — Z79.01 LONG TERM CURRENT USE OF ANTICOAGULANT THERAPY: ICD-10-CM

## 2023-02-01 DIAGNOSIS — Z79.01 ANTICOAGULANT LONG-TERM USE: Primary | ICD-10-CM

## 2023-02-01 DIAGNOSIS — Z95.2 H/O MECHANICAL AORTIC VALVE REPLACEMENT: ICD-10-CM

## 2023-02-01 DIAGNOSIS — E11.51 TYPE 2 DIABETES MELLITUS WITH DIABETIC PERIPHERAL ANGIOPATHY WITHOUT GANGRENE, WITHOUT LONG-TERM CURRENT USE OF INSULIN: ICD-10-CM

## 2023-02-01 DIAGNOSIS — D04.4 SQUAMOUS CELL CARCINOMA IN SITU OF SCALP: Primary | ICD-10-CM

## 2023-02-01 DIAGNOSIS — N18.32 STAGE 3B CHRONIC KIDNEY DISEASE: Chronic | ICD-10-CM

## 2023-02-01 LAB
ANION GAP SERPL CALC-SCNC: 9 MMOL/L (ref 8–16)
BUN SERPL-MCNC: 51 MG/DL (ref 8–23)
CALCIUM SERPL-MCNC: 10.5 MG/DL (ref 8.7–10.5)
CHLORIDE SERPL-SCNC: 107 MMOL/L (ref 95–110)
CO2 SERPL-SCNC: 23 MMOL/L (ref 23–29)
CREAT SERPL-MCNC: 2.2 MG/DL (ref 0.5–1.4)
EST. GFR  (NO RACE VARIABLE): 29.4 ML/MIN/1.73 M^2
ESTIMATED AVG GLUCOSE: 103 MG/DL (ref 68–131)
GLUCOSE SERPL-MCNC: 98 MG/DL (ref 70–110)
HBA1C MFR BLD: 5.2 % (ref 4–5.6)
INR PPP: 2.5 (ref 0.8–1.2)
POTASSIUM SERPL-SCNC: 5.1 MMOL/L (ref 3.5–5.1)
PROTHROMBIN TIME: 25.3 SEC (ref 9–12.5)
SODIUM SERPL-SCNC: 139 MMOL/L (ref 136–145)

## 2023-02-01 PROCEDURE — 80048 BASIC METABOLIC PNL TOTAL CA: CPT | Mod: HCNC | Performed by: INTERNAL MEDICINE

## 2023-02-01 PROCEDURE — 99499 NO LOS: ICD-10-PCS | Mod: HCNC,S$GLB,, | Performed by: DERMATOLOGY

## 2023-02-01 PROCEDURE — 13121 PR RECMPL WND SCALP,EXTR 2.6-7.5 CM: ICD-10-PCS | Mod: HCNC,S$GLB,, | Performed by: DERMATOLOGY

## 2023-02-01 PROCEDURE — 36415 COLL VENOUS BLD VENIPUNCTURE: CPT | Mod: HCNC | Performed by: INTERNAL MEDICINE

## 2023-02-01 PROCEDURE — 93793 PR ANTICOAGULANT MGMT FOR PT TAKING WARFARIN: ICD-10-PCS | Mod: S$GLB,,,

## 2023-02-01 PROCEDURE — 17312 MOHS ADDL STAGE: CPT | Mod: HCNC,S$GLB,, | Performed by: DERMATOLOGY

## 2023-02-01 PROCEDURE — 93793 ANTICOAG MGMT PT WARFARIN: CPT | Mod: S$GLB,,,

## 2023-02-01 PROCEDURE — 17311: ICD-10-PCS | Mod: HCNC,51,S$GLB, | Performed by: DERMATOLOGY

## 2023-02-01 PROCEDURE — 85610 PROTHROMBIN TIME: CPT | Mod: HCNC | Performed by: INTERNAL MEDICINE

## 2023-02-01 PROCEDURE — 13121 CMPLX RPR S/A/L 2.6-7.5 CM: CPT | Mod: HCNC,S$GLB,, | Performed by: DERMATOLOGY

## 2023-02-01 PROCEDURE — 83036 HEMOGLOBIN GLYCOSYLATED A1C: CPT | Mod: HCNC | Performed by: INTERNAL MEDICINE

## 2023-02-01 PROCEDURE — 99499 UNLISTED E&M SERVICE: CPT | Mod: HCNC,S$GLB,, | Performed by: DERMATOLOGY

## 2023-02-01 PROCEDURE — 17312: ICD-10-PCS | Mod: HCNC,S$GLB,, | Performed by: DERMATOLOGY

## 2023-02-01 PROCEDURE — 17311 MOHS 1 STAGE H/N/HF/G: CPT | Mod: HCNC,51,S$GLB, | Performed by: DERMATOLOGY

## 2023-02-01 RX ORDER — AMOXICILLIN 500 MG
1 CAPSULE ORAL DAILY
COMMUNITY

## 2023-02-01 RX ORDER — HYDROCODONE BITARTRATE AND ACETAMINOPHEN 5; 325 MG/1; MG/1
1 TABLET ORAL EVERY 6 HOURS PRN
Qty: 10 TABLET | Refills: 0 | Status: SHIPPED | OUTPATIENT
Start: 2023-02-01 | End: 2023-02-14

## 2023-02-01 RX ORDER — CEPHALEXIN 500 MG/1
500 CAPSULE ORAL 3 TIMES DAILY
Qty: 30 CAPSULE | Refills: 0 | Status: SHIPPED | OUTPATIENT
Start: 2023-02-01 | End: 2023-02-11

## 2023-02-01 NOTE — PROGRESS NOTES
PROCEDURE: Mohs' Micrographic Surgery    INDICATION: Location in non-mask areas of face where maximum conservation of tumor-free tissue is needed. Size > 1.0 cm on face. Biopsy-proven skin cancer of cosmetically and functionally important areas, including head, neck, genital, hand, foot, or areas known for having difficulty in healing, such as the lower anterior legs. Tumors with aggressive clinical behavior (rapidly growing, greater than 1 cm in diameter). Tumor with ill-defined borders.    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 Squamous cell carcinoma in situ: Epidermis with full-thickness atypia and variable epidermal maturation.   Depth of Invasion: epidermis  Perineural Invasion: No    HISTOLOGY OF SPECIMENS IN SUBSEQUENT STAGES:  Tumor Type: Tumor seen. Squamous cell carcinoma in situ: Epidermis with full-thickness atypia and variable epidermal maturation.   Depth of Invasion: epidermis  Perineural Invasion: No    STAGES OF MOHS' SURGERY PERFORMED: 4    TUMOR-FREE PLANE ACHIEVED: Yes    HEMOSTASIS: electrocoagulation     SPECIMENS: 9 (4 in stage A, 3 in stage B, 1 in stage C, and 1 in stage D)    LOCATION: vertex scalp. Location verified with Dr. Muñiz's clinical photograph. Patient also verified location with hand held mirror.    INITIAL LESION SIZE: 1.2 x 1.9 cm    FINAL DEFECT SIZE: 2.5 x 2.8 cm    WOUND REPAIR/DISPOSITION: The patient tolerated Mohs' Micrographic Surgery for a squamous cell carcinoma in situ very well. When the tumor was  completely removed, a repair of the surgical defect was undertaken.    PROCEDURE: Complex Linear Repair    INDICATION: Status post Mohs' Micrographic Surgery for squamous cell carcinoma in situ.    CASE NUMBER:     SURGEON: Jesus Bueno MD    ASSISTANTS: Kimmie Arzola PA-C and Nick Chopra    ANESTHETIC: 3 cc 1% Lidocaine with Epinephrine 1:100,000    SURGICAL PREP: Hibiclens, prepped by  Nick Chopra    LOCATION: vertex scalp    DEFECT SIZE: 2.5 x 2.8 cm    WOUND REPAIR/DISPOSITION:  After the patient's carcinoma had been completely removed with Mohs' Micrographic Surgery, a repair of the surgical defect was undertaken. The patient was returned to the operating suite where the area of vertex scalp was prepped, draped, and anesthetized in the usual sterile fashion. The wound was widely undermined in all directions. The wound was undermined to a distance at least the maximum width of the defect as measured perpendicular to the closure line along at least one entire edge of the defect, in this case 2.5 cm. Then, electrocoagulation was used to obtain meticulous hemostasis. 3-0 Vicryl buried vertical mattress sutures were placed into the subcutaneous and dermal plane to close the wound and birdie the cutaneous wound edge. Bilateral dog ears were identified and were removed by a standard Burow's triangle technique. The cutaneous wound edges were closed using interrupted 3-0 Prolene suture.    The patient tolerated the procedure well.    The area was cleaned and dressed appropriately and the patient was given wound care instructions, as well as appointment for follow-up evaluation and suture removal in 14 days. Patient was placed on Norco 5-325 mg prn postop pain and Keflex 500 mg TID x 10 days.    LENGTH OF REPAIR: 6.8 cm    Vitals:    02/01/23 0740 02/01/23 0758 02/01/23 1248   BP:  126/64 (!) 160/71   BP Location:  Right arm Right arm   Patient Position:  Sitting Sitting   BP Method:   "Medium (Manual) Small (Automatic)   Pulse:   73   Weight: 78.9 kg (174 lb)     Height: 5' 5" (1.651 m)           " sepsis w/ multiple sources ([place] more likely) which is resolved  Patient is afebrile, BP improved and appear clinically better.   Urine culture with Pseudomonas aeruginosa (1 strain resistant to carbapenem)  Blood cultures negative x2   s/p 3 days of Vancomycin & 5 days of Meropenem Q8H now on ciprofloxin 500 mg PEG Q12H (day 2) as urine culture positive for  Pseudomona aeruginosa (1 strain resistant to carbapenem).

## 2023-02-03 ENCOUNTER — CLINICAL SUPPORT (OUTPATIENT)
Dept: ENDOSCOPY | Facility: HOSPITAL | Age: 83
End: 2023-02-03
Attending: INTERNAL MEDICINE
Payer: MEDICARE

## 2023-02-03 VITALS — BODY MASS INDEX: 25.43 KG/M2 | WEIGHT: 162 LBS | HEIGHT: 67 IN

## 2023-02-03 DIAGNOSIS — N18.9 CHRONIC RENAL IMPAIRMENT, UNSPECIFIED CKD STAGE: ICD-10-CM

## 2023-02-03 DIAGNOSIS — R55 SYNCOPE: ICD-10-CM

## 2023-02-03 DIAGNOSIS — D50.9 IRON DEFICIENCY ANEMIA, UNSPECIFIED IRON DEFICIENCY ANEMIA TYPE: ICD-10-CM

## 2023-02-03 DIAGNOSIS — Z86.010 HISTORY OF ADENOMATOUS POLYP OF COLON: ICD-10-CM

## 2023-02-03 DIAGNOSIS — I27.20 PULMONARY HYPERTENSION: ICD-10-CM

## 2023-02-03 DIAGNOSIS — R19.7 DIARRHEA IN ADULT PATIENT: ICD-10-CM

## 2023-02-06 ENCOUNTER — DOCUMENTATION ONLY (OUTPATIENT)
Dept: CARDIOLOGY | Facility: CLINIC | Age: 83
End: 2023-02-06
Payer: MEDICARE

## 2023-02-06 ENCOUNTER — OFFICE VISIT (OUTPATIENT)
Dept: CARDIOLOGY | Facility: CLINIC | Age: 83
End: 2023-02-06
Payer: MEDICARE

## 2023-02-06 VITALS
DIASTOLIC BLOOD PRESSURE: 73 MMHG | WEIGHT: 171.94 LBS | SYSTOLIC BLOOD PRESSURE: 186 MMHG | HEART RATE: 32 BPM | HEIGHT: 65 IN | OXYGEN SATURATION: 98 % | BODY MASS INDEX: 28.65 KG/M2

## 2023-02-06 DIAGNOSIS — I48.0 PAROXYSMAL ATRIAL FIBRILLATION: ICD-10-CM

## 2023-02-06 DIAGNOSIS — Z95.2 H/O MECHANICAL AORTIC VALVE REPLACEMENT: ICD-10-CM

## 2023-02-06 DIAGNOSIS — I25.10 CORONARY ARTERY DISEASE INVOLVING NATIVE CORONARY ARTERY OF NATIVE HEART WITHOUT ANGINA PECTORIS: Primary | Chronic | ICD-10-CM

## 2023-02-06 DIAGNOSIS — Z95.2 H/O MECHANICAL AORTIC VALVE REPLACEMENT: Primary | ICD-10-CM

## 2023-02-06 PROCEDURE — 1159F MED LIST DOCD IN RCRD: CPT | Mod: HCNC,CPTII,S$GLB, | Performed by: INTERNAL MEDICINE

## 2023-02-06 PROCEDURE — 99999 PR PBB SHADOW E&M-EST. PATIENT-LVL III: ICD-10-PCS | Mod: PBBFAC,HCNC,, | Performed by: INTERNAL MEDICINE

## 2023-02-06 PROCEDURE — 3077F PR MOST RECENT SYSTOLIC BLOOD PRESSURE >= 140 MM HG: ICD-10-PCS | Mod: HCNC,CPTII,S$GLB, | Performed by: INTERNAL MEDICINE

## 2023-02-06 PROCEDURE — 99205 OFFICE O/P NEW HI 60 MIN: CPT | Mod: HCNC,S$GLB,, | Performed by: INTERNAL MEDICINE

## 2023-02-06 PROCEDURE — 1159F PR MEDICATION LIST DOCUMENTED IN MEDICAL RECORD: ICD-10-PCS | Mod: HCNC,CPTII,S$GLB, | Performed by: INTERNAL MEDICINE

## 2023-02-06 PROCEDURE — 3072F PR LOW RISK FOR RETINOPATHY: ICD-10-PCS | Mod: HCNC,CPTII,S$GLB, | Performed by: INTERNAL MEDICINE

## 2023-02-06 PROCEDURE — 1125F AMNT PAIN NOTED PAIN PRSNT: CPT | Mod: HCNC,CPTII,S$GLB, | Performed by: INTERNAL MEDICINE

## 2023-02-06 PROCEDURE — 1160F PR REVIEW ALL MEDS BY PRESCRIBER/CLIN PHARMACIST DOCUMENTED: ICD-10-PCS | Mod: HCNC,CPTII,S$GLB, | Performed by: INTERNAL MEDICINE

## 2023-02-06 PROCEDURE — 99205 PR OFFICE/OUTPT VISIT, NEW, LEVL V, 60-74 MIN: ICD-10-PCS | Mod: HCNC,S$GLB,, | Performed by: INTERNAL MEDICINE

## 2023-02-06 PROCEDURE — 3072F LOW RISK FOR RETINOPATHY: CPT | Mod: HCNC,CPTII,S$GLB, | Performed by: INTERNAL MEDICINE

## 2023-02-06 PROCEDURE — 1125F PR PAIN SEVERITY QUANTIFIED, PAIN PRESENT: ICD-10-PCS | Mod: HCNC,CPTII,S$GLB, | Performed by: INTERNAL MEDICINE

## 2023-02-06 PROCEDURE — 99999 PR PBB SHADOW E&M-EST. PATIENT-LVL III: CPT | Mod: PBBFAC,HCNC,, | Performed by: INTERNAL MEDICINE

## 2023-02-06 PROCEDURE — 3078F PR MOST RECENT DIASTOLIC BLOOD PRESSURE < 80 MM HG: ICD-10-PCS | Mod: HCNC,CPTII,S$GLB, | Performed by: INTERNAL MEDICINE

## 2023-02-06 PROCEDURE — 3077F SYST BP >= 140 MM HG: CPT | Mod: HCNC,CPTII,S$GLB, | Performed by: INTERNAL MEDICINE

## 2023-02-06 PROCEDURE — 1160F RVW MEDS BY RX/DR IN RCRD: CPT | Mod: HCNC,CPTII,S$GLB, | Performed by: INTERNAL MEDICINE

## 2023-02-06 PROCEDURE — 3078F DIAST BP <80 MM HG: CPT | Mod: HCNC,CPTII,S$GLB, | Performed by: INTERNAL MEDICINE

## 2023-02-06 RX ORDER — CARVEDILOL 12.5 MG/1
TABLET ORAL
COMMUNITY
Start: 2023-01-25 | End: 2023-02-14

## 2023-02-06 NOTE — H&P (VIEW-ONLY)
PCP - Devon Langston MD  Referring Physician:     Subjective:   Patient ID:  Dariel Urbina is a 82 y.o. 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 Devine for MitraClip evaluation. Patient states that over the past month he has been having intermittent chest pain while walking. Pain does not radiate and is relievedby rest. PET stress positive in RCA/Lcx territory. He has also experienced lower extremity edema and shortness of breath. He was seen by  who referred him for MitraClip. Denies palpitations and claudication. Patient also reports R arm pain due to recent fall.    History:     Social History     Tobacco Use    Smoking status: Former     Packs/day: 1.00     Years: 20.00     Pack years: 20.00     Types: Cigarettes     Quit date: 1980     Years since quittin.4     Passive exposure: Never    Smokeless tobacco: Never   Substance Use Topics    Alcohol use: No     Family History   Problem Relation Age of Onset    Heart failure Mother     Heart disease Mother     Heart failure Father     Heart disease Father     Alcohol abuse Father     Heart failure Brother     Heart disease Brother     Diabetes Brother     No Known Problems Sister     No Known Problems Maternal Grandmother     No Known Problems Maternal Grandfather     No Known Problems Paternal Grandmother     No Known Problems Paternal Grandfather     Heart disease Sister     No Known Problems Maternal Aunt     No Known Problems Maternal Uncle     No Known Problems Paternal Aunt     No Known Problems Paternal Uncle     Amblyopia Neg Hx     Blindness Neg Hx     Cancer Neg Hx     Cataracts Neg Hx     Glaucoma Neg Hx     Hypertension Neg Hx     Macular degeneration Neg Hx     Retinal detachment Neg Hx     Strabismus Neg Hx     Stroke Neg Hx     Thyroid disease Neg Hx     Anemia Neg Hx     Arrhythmia Neg Hx     Asthma Neg  Hx     Clotting disorder Neg Hx     Fainting Neg Hx     Heart attack Neg Hx     Hyperlipidemia Neg Hx     Atrial Septal Defect Neg Hx     Melanoma Neg Hx        Meds:     Review of patient's allergies indicates:   Allergen Reactions    Fosinopril      Intolerance- elevates potassium level      Losartan      Intolerance- elevates potassium level       Current Outpatient Medications:     allopurinoL (ZYLOPRIM) 100 MG tablet, TAKE 1 TABLET EVERY DAY, Disp: 90 tablet, Rfl: 3    bumetanide (BUMEX) 1 MG tablet, Take 1 tablet (1 mg total) by mouth once daily., Disp: 30 tablet, Rfl: 11    cephALEXin (KEFLEX) 500 MG capsule, Take 1 capsule (500 mg total) by mouth 3 (three) times daily. for 10 days, Disp: 30 capsule, Rfl: 0    ferrous gluconate (FERGON) 324 MG tablet, Take 1 tablet (324 mg total) by mouth daily with breakfast., Disp: 90 tablet, Rfl: 1    isosorbide mononitrate (IMDUR) 60 MG 24 hr tablet, Take 1 tablet (60 mg total) by mouth once daily., Disp: 30 tablet, Rfl: 1    lisinopriL (PRINIVIL,ZESTRIL) 2.5 MG tablet, Take 1 tablet (2.5 mg total) by mouth once daily., Disp: 30 tablet, Rfl: 1    loperamide (IMODIUM) 2 mg capsule, Take 2 mg by mouth 4 (four) times daily as needed for Diarrhea., Disp: , Rfl:     oxymetazoline (AFRIN) 0.05 % nasal spray, Use 2 sprays by Nasal route daily as needed (nose bleed)., Disp: 30 mL, Rfl: 0    rosuvastatin (CRESTOR) 40 MG Tab, TAKE 1 TABLET EVERY EVENING., Disp: 90 tablet, Rfl: 3    warfarin (COUMADIN) 5 MG tablet, warfarin 7.5 (1.5 tablets) sun and Thursday, and 5mg other days, Disp: 30 tablet, Rfl: 11    carvediloL (COREG) 12.5 MG tablet, , Disp: , Rfl:     HYDROcodone-acetaminophen (NORCO) 5-325 mg per tablet, Take 1 tablet by mouth every 6 (six) hours as needed for Pain. (Patient not taking: Reported on 2/6/2023), Disp: 10 tablet, Rfl: 0    metoprolol succinate (TOPROL-XL) 25 MG 24 hr tablet, Take 1 tablet (25 mg total) by mouth once daily. (Patient not taking: Reported on  "2/6/2023), Disp: 30 tablet, Rfl: 0    omega-3 fatty acids/fish oil (FISH OIL-OMEGA-3 FATTY ACIDS) 300-1,000 mg capsule, Take 1 capsule by mouth once daily., Disp: , Rfl:   No current facility-administered medications for this visit.    Facility-Administered Medications Ordered in Other Visits:     ondansetron injection 4 mg, 4 mg, Intravenous, Daily PRN, Tara Hernandez MD    ondansetron injection 4 mg, 4 mg, Intravenous, Daily PRN, Tara Hernandez MD    phenylephrine HCL 2.5% ophthalmic solution 1 drop, 1 drop, Left Eye, On Call Procedure, Alia Mckeon MD, 1 drop at 11/13/22 0630    phenylephrine HCL 2.5% ophthalmic solution 1 drop, 1 drop, Right Eye, On Call Procedure, Alia Mckeon MD, 1 drop at 12/04/22 0630    proparacaine 0.5 % ophthalmic solution 1 drop, 1 drop, Left Eye, On Call Procedure, Alia Mckeon MD, 1 drop at 11/13/22 0620    proparacaine 0.5 % ophthalmic solution 1 drop, 1 drop, Right Eye, On Call Procedure, Alia Mckeon MD, 1 drop at 12/04/22 0620    tropicamide 1% ophthalmic solution 1 drop, 1 drop, Left Eye, On Call Procedure, Alia Mckeon MD, 1 drop at 11/13/22 0630    tropicamide 1% ophthalmic solution 1 drop, 1 drop, Right Eye, On Call Procedure, Alia Mckeon MD, 1 drop at 12/04/22 0635        Objective:   BP (!) 186/73 (BP Location: Left arm, Patient Position: Sitting, BP Method: Large (Automatic))   Pulse (!) 32   Ht 5' 5" (1.651 m)   Wt 78 kg (171 lb 15.3 oz)   SpO2 98%   BMI 28.62 kg/m²     Physical Exam  Gen: No apparent distress, resting comfortably  HEENT: Pupils equal and reactive to light  Cardio: Regular rate, point of maximal impulse not displaced, no murmur noted, 2+ radial pulses bilaterally, 2+ DP pulses bilaterally  Resp: CTAB, no wheezing  Abd: Soft, non-tender, non-distended  Skin: Warm, dry, 1+ edema noted  Neuro: Alert and oriented x3  Psych: Normal mood and affect      Labs:     Lab Results   Component Value Date     02/01/2023 "    K 5.1 02/01/2023     02/01/2023    CO2 23 02/01/2023    BUN 51 (H) 02/01/2023    CREATININE 2.2 (H) 02/01/2023    ANIONGAP 9 02/01/2023     Lab Results   Component Value Date    HGBA1C 5.2 02/01/2023     Lab Results   Component Value Date    BNP 1,188 (H) 12/29/2022     (H) 12/21/2022    BNP 2,348 (H) 10/23/2022       Lab Results   Component Value Date    WBC 4.96 12/31/2022    WBC 4.96 12/31/2022    HGB 8.8 (L) 01/09/2023    HCT 27.5 (L) 12/31/2022    HCT 27.5 (L) 12/31/2022     12/31/2022     12/31/2022    GRAN 2.8 12/31/2022    GRAN 55.7 12/31/2022    GRAN 2.8 12/31/2022    GRAN 55.7 12/31/2022     Lab Results   Component Value Date    CHOL 103 (L) 02/10/2022    HDL 34 (L) 02/10/2022    LDLCALC 43.4 (L) 02/10/2022    TRIG 128 02/10/2022       Lab Results   Component Value Date     02/01/2023    K 5.1 02/01/2023     02/01/2023    CO2 23 02/01/2023    BUN 51 (H) 02/01/2023    CREATININE 2.2 (H) 02/01/2023    ANIONGAP 9 02/01/2023     Lab Results   Component Value Date    HGBA1C 5.2 02/01/2023     Lab Results   Component Value Date    BNP 1,188 (H) 12/29/2022     (H) 12/21/2022    BNP 2,348 (H) 10/23/2022    Lab Results   Component Value Date    WBC 4.96 12/31/2022    WBC 4.96 12/31/2022    HGB 8.8 (L) 01/09/2023    HCT 27.5 (L) 12/31/2022    HCT 27.5 (L) 12/31/2022     12/31/2022     12/31/2022    GRAN 2.8 12/31/2022    GRAN 55.7 12/31/2022    GRAN 2.8 12/31/2022    GRAN 55.7 12/31/2022     Lab Results   Component Value Date    CHOL 103 (L) 02/10/2022    HDL 34 (L) 02/10/2022    LDLCALC 43.4 (L) 02/10/2022    TRIG 128 02/10/2022                Cardiovascular Imaging:     Echo 12/30/22:  The left ventricle is normal in size with mild concentric hypertrophy and mildly decreased systolic function.  The estimated ejection fraction is 48%.  There are segmental left ventricular wall motion abnormalities.  There is abnormal septal wall motion.  Grade III left  ventricular diastolic dysfunction.  Severe left atrial enlargement.  Normal right ventricular size with normal right ventricular systolic function.  Mild right atrial enlargement.  There is a mechanical aortic valve present.  The aortic valve mean gradient is 17 mmHg with a dimensionless index of 0.38.  Moderate to moderate -severe ( 2-3+) MR  Moderate tricuspid regurgitation.  Normal central venous pressure (3 mmHg).  The estimated PA systolic pressure is 62 mmHg.  There is at least moderate pulmonary hypertension.     PET Stress test:   There is a very small (< 5%) sized, moderate intensity basal to distal inferior and inferolateral resting perfusion abnormality involving 3% of LV myocardium in the distribution of the LCX and RCA. After pharmacologic stress, this perfusion abnormality is larger and more severe involving 16% of the LV myocardium, indicative of ischemia with underlying scar.    Within the perfusion abnormality, absolute myocardial perfusion (cc/min/gm) averaged 0.61 cc/min/g at rest, 0.58 cc/min/g at stress and CFR was 0.95 , which equates to severely reduced coronary flow capacity within the LCX and RCA territory.    There are no other significant perfusion abnormalities.    The whole heart absolute myocardial perfusion values averaged 0.89 cc/min/g at rest, which is normal; 1.10 cc/min/g at stress, which is mildly reduced; and CFR is 1.23 , which is severely reduced.    CT attenuation images demonstrate severe diffuse coronary calcifications in the LAD, LCX, moderate calcification in the RCA territory and severe diffuse aortic calcifications in the ascending aorta and descending aorta.    The gated perfusion images showed an ejection fraction of 46% at rest and 46% during stress. A normal ejection fraction is greater than 47%.    There is mild global hypokinesis at rest and during stress.    The LV cavity size is normal at rest and stress.    The ECG portion of the study is uninterpretable due to  left bundle branch block.    During stress, rare PVCs are noted.    The patient reported chest pain during the stress test.    When compared to the previous study from 11/3/2021, the reversible inferolateral perfusion defect is still present.     [facility] 11/30/21:  Elecative PCI of LCX  The Ost Cx lesion was 99% stenosed with 0% stenosis post-intervention. Two overlapping RESOLUTE ALEJO [id number] stent was successfully placed.  The Prox Cx lesion was 75% stenosed with 0% stenosis post-intervention. A STENT RESOLUTE ALEJO [id number] stent was successfully placed at 12 BAIRON for 10 sec.  IVUS of LCX and LM for stent sizing  The Ost LAD lesion was 80% stenosed. patent LIMA to LAD graft in previous angiogram, graft not injected today  The estimated blood loss was <50 mL    [facility] 9/2021:  There was three vessel coronary artery disease.  The Mid LM to Dist LM lesion was 60% stenosed.  Patent CAMARGO to LAD  The Ost Cx to Prox Cx lesion was 99% stenosed.  The Prox L MOOK lesion was 75% stenosed.  The Prox R MOOK lesion was 70% stenosed.  The Dist L SFA lesion was 50% stenosed.    B/l U/S:  POOL 3/2021                 R 0.89 to 0.39 after exercise               L 1.06 to 0.77 after exercise .    Assessment & Plan:     MitraClip Evaluation  -Patient presents for MitraClip evaluation  -Needs [facility] first to look at Lcx stent  --[facility] +/- PCI, patient is a SILVIO candidate  - Anti-platelet Therapy: ASA. Stop Warfarin 3 days before  - Access: Right femoral  - Creatinine/CrCl: 2 (Patient has CKD IV)  - Allergies: No shellfish / Iodine allergy  - Pre-Hydration: NS x100 cc  - Pre-Op Med: Bendaryl 50mg pO   - All patient's questions were answered.  -The risks, benefits and alternatives of the procedure were explained to the patient.   -The risks of coronary angiography include but are not limited to: bleeding, infection, heart rhythm abnormalities, allergic reactions, kidney injury and potential need for dialysis, stroke and death.   - Should stenting be  indicated, the patient has agreed to dual anti-platelet therapy for 1-consecutive year with a drug-eluting stent and a minimum of 1-month with the use of a bare metal stent  - Additionally, pt is aware that non-compliance is likely to result in stent clotting with heart attack, heart failure, and/or death  -The risks of moderate sedation include hypotension, respiratory depression, arrhythmias, bronchospasm, and death.   - Informed consent was obtained and the  patient is agreeable to proceed with the procedure.    2. HFmrEF  -EF 48%    3. Recent fall  -Consult to Ortho for X-rays    Signed:  Pierre Mcclure MD  [facility] PGY-5    Staff attestation to follow.    I have seen the patient, reviewed the Fellow's history and physical, assessment and plan. I have personally interviewed and examined the patient and agree with the findings. Mod to severe AS. Cor angio and repeat ECHO if angioplasty.     RENETTA Garnica MD

## 2023-02-06 NOTE — PROGRESS NOTES
OUTPATIENT CATHETERIZATION INSTRUCTIONS    You have been scheduled for a procedure in the catheterization lab on Thursday, March 2, 2023.     Please report to the Cardiology Waiting Area on the Third floor of the hospital and check in at 7 AM.   You will then be taken to the SSCU (Short Stay Cardiac Unit) and prepared for your procedure. Please be aware that this is not the time of your procedure but the time you are to arrive. The procedures are scheduled on an hourly basis; however, emergency cases take precedence over all other cases.       No solid foods 8 hours prior to your procedure.  You may have clear liquids until the time of your admission which should be 2 hours prior to your procedure.  You are encouraged to drink at least 8 ounces of clear liquids prior to your admission to SSCU.  Patients with gastric emptying issues should be fasting for 6- 8 hours prior to the procedure.  Clear liquids include water, black coffee, clear juices, and performance drinks - no pulp or milk.    Heart failure or dialysis patients will be limited to 8 ounces (1 cup) of clear liquids up until 2 hours of the procedure.    3.   You may take your regular morning medications with water. If there are any medications that you should not take you will be instructed to hold them that morning. If you         are diabetic and on Metformin (Glucophage) do not take it the day before, the day of, and for 2 days after your procedure.  4.   If you are on Pradaxa, Eliquis or Coumadin , you can hold them 3 days prior to your procedure.      The procedure will take 1-2 hours to perform. After the procedure, you will return to SSCU on the third floor of the hospital. You will need to lie still (or keep your arm still) for the next 2 to 4 hours to minimize bleeding from the puncture site.  This time is determined by your physician.  Your family may remain in the room with you during this time.       You may be able to be discharged home that  "same afternoon if there is someone to drive you home and there are no complications.  Your doctor will determine, based on your progress, the date and time of your discharge. The results of your procedure will be discussed with you before you are discharged. Any further testing or procedures will be scheduled for you either before you leave or after your discharge..       If you should have any questions, concerns, or need to change the date of your procedure, please call "RADHA Hathaway @ [phone]            Special Instructions:    Your last dose of Coumadin will be on Monday, February 27, 2023. It has to be stopped three days before your procedure.  You will receive instructions on when to resume your medications.    You can continue to take your other prescribed medications.          THE ABOVE INSTRUCTIONS WERE GIVEN TO THE PATIENT VERBALLY AND THEY VERBALIZED UNDERSTANDING.  THEY DO NOT REQUIRE ANY SPECIAL NEEDS AND DO NOT HAVE ANY LEARNING BARRIERS.          Directions for Reporting to Cardiology Waiting Area in the Hospital  If you park in the Parking Garage:  Take elevators to the1st floor of the parking garage.  Continue past the gift shop, coffee shop, and piano.  Take a right and go to the gold elevators. (Elevator B)  Take the elevator to the 3rd floor.  Follow the arrow on the sign on the wall that says Cath Lab Registration/EP Lab Registration.  Follow the long hallway all the way around until you come to a big open area.  This is the registration area.  Check in at Reception Desk.    OR    If family is dropping you off:  Have them drop you off at the front of the Hospital under the green overhang.  Enter through the doors and take a right.  Take the E elevators to the 3rd floor Cardiology Waiting Area.  Check in at the Reception Desk in the waiting room.             "

## 2023-02-06 NOTE — ASSESSMENT & PLAN NOTE
--C +/- PCI, patient is a SILVIO candidate  - Anti-platelet Therapy: ASA. Stop Warfarin 3 days prior  - Access: Right femoral  - Creatinine/CrCl: 2  - Allergies: No shellfish / Iodine allergy  - Pre-Hydration: NS  - Pre-Op Med: Bendaryl 50mg pO   - All patient's questions were answered.  -The risks, benefits and alternatives of the procedure were explained to the patient.   -The risks of coronary angiography include but are not limited to: bleeding, infection, heart rhythm abnormalities, allergic reactions, kidney injury and potential need for dialysis, stroke and death.   - Should stenting be indicated, the patient has agreed to dual anti-platelet therapy for 1-consecutive year with a drug-eluting stent and a minimum of 1-month with the use of a bare metal stent  - Additionally, pt is aware that non-compliance is likely to result in stent clotting with heart attack, heart failure, and/or death  -The risks of moderate sedation include hypotension, respiratory depression, arrhythmias, bronchospasm, and death.   - Informed consent was obtained and the  patient is agreeable to proceed with the procedure.

## 2023-02-07 ENCOUNTER — OFFICE VISIT (OUTPATIENT)
Dept: URGENT CARE | Facility: CLINIC | Age: 83
End: 2023-02-07
Payer: MEDICARE

## 2023-02-07 VITALS
OXYGEN SATURATION: 98 % | HEART RATE: 70 BPM | TEMPERATURE: 97 F | HEIGHT: 65 IN | WEIGHT: 171 LBS | BODY MASS INDEX: 28.49 KG/M2 | SYSTOLIC BLOOD PRESSURE: 134 MMHG | DIASTOLIC BLOOD PRESSURE: 100 MMHG

## 2023-02-07 DIAGNOSIS — S22.41XA CLOSED FRACTURE OF MULTIPLE RIBS OF RIGHT SIDE, INITIAL ENCOUNTER: ICD-10-CM

## 2023-02-07 DIAGNOSIS — R05.8 SPUTUM PRODUCTION: ICD-10-CM
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
## 2023-02-07 PROCEDURE — 1125F PR PAIN SEVERITY QUANTIFIED, PAIN PRESENT: ICD-10-PCS | Mod: CPTII,S$GLB,, | Performed by: PHYSICIAN ASSISTANT

## 2023-02-07 PROCEDURE — 71101 XR RIBS MIN 3 VIEWS W/ PA CHEST RIGHT: ICD-10-PCS | Mod: RT,S$GLB,, | Performed by: RADIOLOGY

## 2023-02-07 PROCEDURE — 3072F LOW RISK FOR RETINOPATHY: CPT | Mod: CPTII,S$GLB,, | Performed by: PHYSICIAN ASSISTANT

## 2023-02-07 PROCEDURE — 99204 OFFICE O/P NEW MOD 45 MIN: CPT | Mod: S$GLB,,, | Performed by: PHYSICIAN ASSISTANT

## 2023-02-07 PROCEDURE — 71101 X-RAY EXAM UNILAT RIBS/CHEST: CPT | Mod: RT,S$GLB,, | Performed by: RADIOLOGY

## 2023-02-07 PROCEDURE — 3072F PR LOW RISK FOR RETINOPATHY: ICD-10-PCS | Mod: CPTII,S$GLB,, | Performed by: PHYSICIAN ASSISTANT

## 2023-02-07 PROCEDURE — 3075F SYST BP GE 130 - 139MM HG: CPT | Mod: CPTII,S$GLB,, | Performed by: PHYSICIAN ASSISTANT

## 2023-02-07 PROCEDURE — 1125F AMNT PAIN NOTED PAIN PRSNT: CPT | Mod: CPTII,S$GLB,, | Performed by: PHYSICIAN ASSISTANT

## 2023-02-07 PROCEDURE — 3075F PR MOST RECENT SYSTOLIC BLOOD PRESS GE 130-139MM HG: ICD-10-PCS | Mod: CPTII,S$GLB,, | Performed by: PHYSICIAN ASSISTANT

## 2023-02-07 PROCEDURE — 73030 XR SHOULDER COMPLETE 2 OR MORE VIEWS RIGHT: ICD-10-PCS | Mod: RT,S$GLB,, | Performed by: RADIOLOGY

## 2023-02-07 PROCEDURE — 3080F DIAST BP >= 90 MM HG: CPT | Mod: CPTII,S$GLB,, | Performed by: PHYSICIAN ASSISTANT

## 2023-02-07 PROCEDURE — 1159F MED LIST DOCD IN RCRD: CPT | Mod: CPTII,S$GLB,, | Performed by: PHYSICIAN ASSISTANT

## 2023-02-07 PROCEDURE — 1160F RVW MEDS BY RX/DR IN RCRD: CPT | Mod: CPTII,S$GLB,, | Performed by: PHYSICIAN ASSISTANT

## 2023-02-07 PROCEDURE — 99204 PR OFFICE/OUTPT VISIT, NEW, LEVL IV, 45-59 MIN: ICD-10-PCS | Mod: S$GLB,,, | Performed by: PHYSICIAN ASSISTANT

## 2023-02-07 PROCEDURE — 1159F PR MEDICATION LIST DOCUMENTED IN MEDICAL RECORD: ICD-10-PCS | Mod: CPTII,S$GLB,, | Performed by: PHYSICIAN ASSISTANT

## 2023-02-07 PROCEDURE — 73030 X-RAY EXAM OF SHOULDER: CPT | Mod: RT,S$GLB,, | Performed by: RADIOLOGY

## 2023-02-07 PROCEDURE — 1160F PR REVIEW ALL MEDS BY PRESCRIBER/CLIN PHARMACIST DOCUMENTED: ICD-10-PCS | Mod: CPTII,S$GLB,, | Performed by: PHYSICIAN ASSISTANT

## 2023-02-07 PROCEDURE — 3080F PR MOST RECENT DIASTOLIC BLOOD PRESSURE >= 90 MM HG: ICD-10-PCS | Mod: CPTII,S$GLB,, | Performed by: PHYSICIAN ASSISTANT

## 2023-02-07 RX ORDER — ALBUTEROL SULFATE 90 UG/1
2 AEROSOL, METERED RESPIRATORY (INHALATION) EVERY 6 HOURS PRN
Qty: 18 G | Refills: 1 | Status: SHIPPED | OUTPATIENT
Start: 2023-02-07 | End: 2023-10-08

## 2023-02-07 RX ORDER — TRAMADOL HYDROCHLORIDE AND ACETAMINOPHEN 37.5; 325 MG/1; MG/1
1 TABLET, FILM COATED ORAL EVERY 12 HOURS PRN
Qty: 20 TABLET | Refills: 0 | Status: ON HOLD | OUTPATIENT
Start: 2023-02-07 | End: 2023-04-11

## 2023-02-07 RX ORDER — MIDAZOLAM HYDROCHLORIDE 1 MG/ML
INJECTION, SOLUTION INTRAMUSCULAR; INTRAVENOUS
Status: ON HOLD | COMMUNITY
Start: 2022-12-04 | End: 2023-03-22 | Stop reason: HOSPADM

## 2023-02-07 RX ORDER — TRAMADOL HYDROCHLORIDE AND ACETAMINOPHEN 37.5; 325 MG/1; MG/1
1 TABLET, FILM COATED ORAL EVERY 12 HOURS PRN
Qty: 20 TABLET | Refills: 0 | Status: SHIPPED | OUTPATIENT
Start: 2023-02-07 | End: 2023-02-07

## 2023-02-07 RX ORDER — GLIMEPIRIDE 1 MG/1
TABLET ORAL
Status: ON HOLD | COMMUNITY
Start: 2023-01-25 | End: 2023-03-22 | Stop reason: HOSPADM

## 2023-02-07 NOTE — PROGRESS NOTES
"Subjective:       Patient ID: Dariel Urbina is a 82 y.o. male.    Vitals:  height is 5' 5" (1.651 m) and weight is 77.6 kg (171 lb). His tympanic temperature is 97.2 °F (36.2 °C). His blood pressure is 134/100 (abnormal) and his pulse is 70. His oxygen saturation is 98%.     Chief Complaint: Shoulder Injury    Last week the pt went outside, and didn't pick his foot up high enough, and he missed a step which he tried to catch his fall with his right arm, and his fist landed against his right side of his rib. NO other c/o    Shoulder Injury   The incident occurred at home. The right shoulder is affected. Incident onset: Last week. The injury mechanism was a fall. The quality of the pain is described as shooting. The pain radiates to the right arm. The pain is at a severity of 6/10. The pain is mild. Pertinent negatives include no chest pain, muscle weakness, numbness or tingling. The symptoms are aggravated by movement and overhead lifting. He has tried heat and rest for the symptoms. Improvement on treatment: Little to none.     Cardiovascular:  Negative for chest pain.   Musculoskeletal:  Positive for pain.   Skin:  Negative for erythema.   Neurological:  Negative for numbness.     Objective:      Physical Exam   Constitutional: He is oriented to person, place, and time. He appears well-developed. He is cooperative.  Non-toxic appearance. He does not appear ill. No distress.   HENT:   Head: Normocephalic and atraumatic.   Ears:   Right Ear: Hearing, tympanic membrane, external ear and ear canal normal.   Left Ear: Hearing, tympanic membrane, external ear and ear canal normal.   Nose: Nose normal. No mucosal edema, rhinorrhea or nasal deformity. No epistaxis. Right sinus exhibits no maxillary sinus tenderness and no frontal sinus tenderness. Left sinus exhibits no maxillary sinus tenderness and no frontal sinus tenderness.   Mouth/Throat: Uvula is midline, oropharynx is clear and moist and mucous membranes are " normal. No trismus in the jaw. Normal dentition. No uvula swelling. No oropharyngeal exudate, posterior oropharyngeal edema or posterior oropharyngeal erythema.   Eyes: Conjunctivae, EOM and lids are normal. Pupils are equal, round, and reactive to light. Right eye exhibits no discharge. Left eye exhibits no discharge. No scleral icterus.   Neck: Trachea normal and phonation normal. Neck supple. No JVD present. No tracheal deviation present. No thyromegaly present. No edema present. No erythema present. No neck rigidity present.   Cardiovascular: Normal rate, regular rhythm, normal heart sounds and normal pulses.   No murmur heard.Exam reveals no gallop and no friction rub.   Pulmonary/Chest: Effort normal and breath sounds normal. No stridor. No respiratory distress. He has no decreased breath sounds. He has no wheezes. He has no rhonchi. He has no rales. He exhibits no tenderness.   Abdominal: Normal appearance. He exhibits no distension. Soft. There is no abdominal tenderness. There is no rebound and no guarding.   Musculoskeletal: Normal range of motion.         General: No deformity. Normal range of motion.      Comments: There is mild TTP over rt lateral chest wall. No crepitus   Neurological: He is alert and oriented to person, place, and time. He exhibits normal muscle tone. Coordination normal.   Skin: Skin is warm, dry, intact, not diaphoretic, not pale and no rash. Capillary refill takes less than 2 seconds. No erythema   Psychiatric: His speech is normal and behavior is normal. Judgment and thought content normal.   Nursing note and vitals reviewed.       XR SHOULDER COMPLETE 2 OR MORE VIEWS RIGHT    Result Date: 2/7/2023  EXAMINATION: XR SHOULDER COMPLETE 2 OR MORE VIEWS RIGHT CLINICAL HISTORY: Unspecified fall, initial encounter TECHNIQUE: Two or three views of the right shoulder were performed. COMPARISON: None FINDINGS: Mildly displaced recent appearing fractures of the right 7th and 8th ribs.  No  definite additional acute fractures or dislocations noted elsewhere.  Degenerative change at the glenohumeral and acromioclavicular joints. Status post median sternotomy.  Chronic appearing fibrotic change the visualized lungs.  No definite acute abnormality in the visualized portions of the abdomen.     Mildly displaced recent appearing fracture of the right 7th and 8th ribs. No definite acute traumatic findings within the field of view examination. Electronically signed by: Yuri Kamara Date:    02/07/2023 Time:    11:08    XR RIB RIGHT W/ PA CHEST    Result Date: 2/7/2023  EXAMINATION: XR RIBS MIN 3 VIEWS W/ PA CHEST RIGHT TECHNIQUE: Four views were obtained, with a PA chest and AP, subdiaphragmatic AP, and oblique projections of the right sided ribs submitted. COMPARISON: Comparison is made to a the most recent prior chest radiograph, dated 12/29/2022.  Clinical information of trauma several days previously is obtained from the electronic medical record. FINDINGS: There are recent fractures involving the posterolateral aspects of the right 7th and 8th ribs. Postoperative changes are again noted in the thorax, including a valvular prosthesis.  Heart is minimally enlarged, with the cardiomediastinal silhouette demonstrating no detrimental change since the examination referenced above.  Pulmonary vascularity is normal.  Lung zones are clear, and are free of significant airspace consolidation or volume loss.  No pleural fluid of any substantial volume is seen on either side.  No hilar or mediastinal mass lesion.  No pneumothorax.  Prominent multilevel marginal vertebral endplate spurring is seen in the visualized thoracolumbar spine, as is calcification in the wall of the aortic arch.     1. Recent fractures involving the posterolateral aspects of the right 7th and 8th ribs. 2. No detrimental interval change in cardiopulmonary status since 12/29/2022.  No pneumothorax. This report was flagged in Epic as abnormal.  A SecureChat message was sent to Physician assistant Shanice on 02/07/2023 at 11:07, immediately upon discovery of the abnormality. Electronically signed by: Kam Patel MD Date:    02/07/2023 Time:    11:07    Assessment:       1. Fall, initial encounter    2. Closed fracture of multiple ribs of right side, initial encounter    3. Sputum production            Plan:         Fall, initial encounter  -     XR SHOULDER COMPLETE 2 OR MORE VIEWS RIGHT; Future; Expected date: 02/07/2023  -     XR RIB RIGHT W/ PA CHEST; Future; Expected date: 02/07/2023    Closed fracture of multiple ribs of right side, initial encounter  -     tramadol-acetaminophen 37.5-325 mg (ULTRACET) 37.5-325 mg Tab; 1 tablet by Per J Tube route every 12 (twelve) hours as needed for Pain (pain).  Dispense: 20 tablet; Refill: 0    Sputum production  -     albuterol (VENTOLIN HFA) 90 mcg/actuation inhaler; Inhale 2 puffs into the lungs every 6 (six) hours as needed for Wheezing. Rescue  Dispense: 18 g; Refill: 1    Follow up if symptoms worsen or fail to improve, for F/U with PCP or ED. There are no Patient Instructions on file for this visit.

## 2023-02-09 ENCOUNTER — TELEPHONE (OUTPATIENT)
Dept: CARDIOTHORACIC SURGERY | Facility: CLINIC | Age: 83
End: 2023-02-09
Payer: MEDICARE

## 2023-02-09 DIAGNOSIS — Z00.00 ENCOUNTER FOR MEDICARE ANNUAL WELLNESS EXAM: ICD-10-CM

## 2023-02-09 NOTE — TELEPHONE ENCOUNTER
Called pt and spoke to pt's wife in attempt to schedule consult . Pt's wife stated pt recently fell and currently have two broken ribs. Pt wife stated they go to see his PCP on Tuesday 2/14 and would like to wait until after they f/u with Dr. Langston before scheduling. Provided wife with back line contact information to  call when she is ready.

## 2023-02-14 ENCOUNTER — PATIENT MESSAGE (OUTPATIENT)
Dept: PHARMACY | Facility: CLINIC | Age: 83
End: 2023-02-14
Payer: MEDICARE

## 2023-02-14 ENCOUNTER — LAB VISIT (OUTPATIENT)
Dept: LAB | Facility: HOSPITAL | Age: 83
End: 2023-02-14
Attending: INTERNAL MEDICINE
Payer: MEDICARE

## 2023-02-14 ENCOUNTER — OFFICE VISIT (OUTPATIENT)
Dept: INTERNAL MEDICINE | Facility: CLINIC | Age: 83
End: 2023-02-14
Payer: MEDICARE

## 2023-02-14 ENCOUNTER — ANTI-COAG VISIT (OUTPATIENT)
Dept: CARDIOLOGY | Facility: CLINIC | Age: 83
End: 2023-02-14
Payer: MEDICARE

## 2023-02-14 ENCOUNTER — OFFICE VISIT (OUTPATIENT)
Dept: DERMATOLOGY | Facility: CLINIC | Age: 83
End: 2023-02-14
Payer: MEDICARE

## 2023-02-14 ENCOUNTER — OFFICE VISIT (OUTPATIENT)
Dept: RADIATION ONCOLOGY | Facility: CLINIC | Age: 83
End: 2023-02-14
Payer: MEDICARE

## 2023-02-14 VITALS
BODY MASS INDEX: 29.34 KG/M2 | SYSTOLIC BLOOD PRESSURE: 140 MMHG | DIASTOLIC BLOOD PRESSURE: 76 MMHG | HEART RATE: 86 BPM | OXYGEN SATURATION: 97 % | WEIGHT: 176.13 LBS | HEIGHT: 65 IN

## 2023-02-14 VITALS
WEIGHT: 176 LBS | TEMPERATURE: 98 F | DIASTOLIC BLOOD PRESSURE: 76 MMHG | SYSTOLIC BLOOD PRESSURE: 140 MMHG | BODY MASS INDEX: 29.29 KG/M2 | HEART RATE: 86 BPM

## 2023-02-14 DIAGNOSIS — E11.22 TYPE 2 DIABETES MELLITUS WITH STAGE 3B CHRONIC KIDNEY DISEASE, WITHOUT LONG-TERM CURRENT USE OF INSULIN: ICD-10-CM

## 2023-02-14 DIAGNOSIS — Z79.01 LONG TERM CURRENT USE OF ANTICOAGULANT THERAPY: ICD-10-CM

## 2023-02-14 DIAGNOSIS — Z23 NEED FOR VACCINATION: ICD-10-CM

## 2023-02-14 DIAGNOSIS — Z09 POSTOP CHECK: Primary | ICD-10-CM

## 2023-02-14 DIAGNOSIS — I25.10 CORONARY ARTERY DISEASE INVOLVING NATIVE CORONARY ARTERY OF NATIVE HEART WITHOUT ANGINA PECTORIS: ICD-10-CM

## 2023-02-14 DIAGNOSIS — N18.32 TYPE 2 DIABETES MELLITUS WITH STAGE 3B CHRONIC KIDNEY DISEASE, WITHOUT LONG-TERM CURRENT USE OF INSULIN: ICD-10-CM

## 2023-02-14 DIAGNOSIS — Z79.01 ANTICOAGULANT LONG-TERM USE: Primary | ICD-10-CM

## 2023-02-14 DIAGNOSIS — R06.09 DOE (DYSPNEA ON EXERTION): ICD-10-CM

## 2023-02-14 DIAGNOSIS — N18.4 STAGE 4 CHRONIC KIDNEY DISEASE: ICD-10-CM

## 2023-02-14 DIAGNOSIS — Z95.2 H/O MECHANICAL AORTIC VALVE REPLACEMENT: ICD-10-CM

## 2023-02-14 DIAGNOSIS — R04.0 EPISTAXIS: ICD-10-CM

## 2023-02-14 DIAGNOSIS — E11.59 HYPERTENSION ASSOCIATED WITH DIABETES: Primary | ICD-10-CM

## 2023-02-14 DIAGNOSIS — I25.10 CORONARY ARTERY DISEASE INVOLVING NATIVE CORONARY ARTERY OF NATIVE HEART WITHOUT ANGINA PECTORIS: Primary | ICD-10-CM

## 2023-02-14 DIAGNOSIS — I65.23 BILATERAL CAROTID ARTERY STENOSIS: ICD-10-CM

## 2023-02-14 DIAGNOSIS — D64.9 ANEMIA, UNSPECIFIED TYPE: ICD-10-CM

## 2023-02-14 DIAGNOSIS — I15.2 HYPERTENSION ASSOCIATED WITH DIABETES: Primary | ICD-10-CM

## 2023-02-14 DIAGNOSIS — N25.81 HYPERPARATHYROIDISM DUE TO RENAL INSUFFICIENCY: ICD-10-CM

## 2023-02-14 DIAGNOSIS — I50.9 CONGESTIVE HEART FAILURE, UNSPECIFIED HF CHRONICITY, UNSPECIFIED HEART FAILURE TYPE: ICD-10-CM

## 2023-02-14 LAB
INR PPP: 3.7 (ref 0.8–1.2)
PROTHROMBIN TIME: 35.6 SEC (ref 9–12.5)

## 2023-02-14 PROCEDURE — 1125F PR PAIN SEVERITY QUANTIFIED, PAIN PRESENT: ICD-10-PCS | Mod: HCNC,CPTII,S$GLB, | Performed by: INTERNAL MEDICINE

## 2023-02-14 PROCEDURE — 3288F PR FALLS RISK ASSESSMENT DOCUMENTED: ICD-10-PCS | Mod: HCNC,CPTII,S$GLB, | Performed by: INTERNAL MEDICINE

## 2023-02-14 PROCEDURE — 1101F PT FALLS ASSESS-DOCD LE1/YR: CPT | Mod: HCNC,CPTII,S$GLB, | Performed by: DERMATOLOGY

## 2023-02-14 PROCEDURE — 1100F PR PT FALLS ASSESS DOC 2+ FALLS/FALL W/INJURY/YR: ICD-10-PCS | Mod: HCNC,CPTII,S$GLB, | Performed by: INTERNAL MEDICINE

## 2023-02-14 PROCEDURE — 99999 PR PBB SHADOW E&M-EST. PATIENT-LVL III: CPT | Mod: PBBFAC,HCNC,, | Performed by: INTERNAL MEDICINE

## 2023-02-14 PROCEDURE — 99999 PR PBB SHADOW E&M-EST. PATIENT-LVL IV: CPT | Mod: PBBFAC,HCNC,, | Performed by: RADIOLOGY

## 2023-02-14 PROCEDURE — 99203 PR OFFICE/OUTPT VISIT, NEW, LEVL III, 30-44 MIN: ICD-10-PCS | Mod: HCNC,S$GLB,, | Performed by: RADIOLOGY

## 2023-02-14 PROCEDURE — 99214 OFFICE O/P EST MOD 30 MIN: CPT | Mod: HCNC,S$GLB,, | Performed by: INTERNAL MEDICINE

## 2023-02-14 PROCEDURE — 1101F PT FALLS ASSESS-DOCD LE1/YR: CPT | Mod: HCNC,CPTII,S$GLB, | Performed by: RADIOLOGY

## 2023-02-14 PROCEDURE — 3072F PR LOW RISK FOR RETINOPATHY: ICD-10-PCS | Mod: HCNC,CPTII,S$GLB, | Performed by: INTERNAL MEDICINE

## 2023-02-14 PROCEDURE — 1101F PR PT FALLS ASSESS DOC 0-1 FALLS W/OUT INJ PAST YR: ICD-10-PCS | Mod: HCNC,CPTII,S$GLB, | Performed by: DERMATOLOGY

## 2023-02-14 PROCEDURE — 99024 POSTOP FOLLOW-UP VISIT: CPT | Mod: HCNC,S$GLB,, | Performed by: DERMATOLOGY

## 2023-02-14 PROCEDURE — 99999 PR PBB SHADOW E&M-EST. PATIENT-LVL III: ICD-10-PCS | Mod: PBBFAC,HCNC,, | Performed by: DERMATOLOGY

## 2023-02-14 PROCEDURE — 93793 ANTICOAG MGMT PT WARFARIN: CPT | Mod: S$GLB,,,

## 2023-02-14 PROCEDURE — 3077F PR MOST RECENT SYSTOLIC BLOOD PRESSURE >= 140 MM HG: ICD-10-PCS | Mod: HCNC,CPTII,S$GLB, | Performed by: RADIOLOGY

## 2023-02-14 PROCEDURE — 3072F LOW RISK FOR RETINOPATHY: CPT | Mod: HCNC,CPTII,S$GLB, | Performed by: RADIOLOGY

## 2023-02-14 PROCEDURE — 99024 PR POST-OP FOLLOW-UP VISIT: ICD-10-PCS | Mod: HCNC,S$GLB,, | Performed by: DERMATOLOGY

## 2023-02-14 PROCEDURE — 93793 PR ANTICOAGULANT MGMT FOR PT TAKING WARFARIN: ICD-10-PCS | Mod: S$GLB,,,

## 2023-02-14 PROCEDURE — 3288F FALL RISK ASSESSMENT DOCD: CPT | Mod: HCNC,CPTII,S$GLB, | Performed by: INTERNAL MEDICINE

## 2023-02-14 PROCEDURE — 85610 PROTHROMBIN TIME: CPT | Mod: HCNC | Performed by: INTERNAL MEDICINE

## 2023-02-14 PROCEDURE — 3072F LOW RISK FOR RETINOPATHY: CPT | Mod: HCNC,CPTII,S$GLB, | Performed by: DERMATOLOGY

## 2023-02-14 PROCEDURE — 1100F PTFALLS ASSESS-DOCD GE2>/YR: CPT | Mod: HCNC,CPTII,S$GLB, | Performed by: INTERNAL MEDICINE

## 2023-02-14 PROCEDURE — 99999 PR PBB SHADOW E&M-EST. PATIENT-LVL III: ICD-10-PCS | Mod: PBBFAC,HCNC,, | Performed by: INTERNAL MEDICINE

## 2023-02-14 PROCEDURE — 36415 COLL VENOUS BLD VENIPUNCTURE: CPT | Mod: HCNC | Performed by: INTERNAL MEDICINE

## 2023-02-14 PROCEDURE — 3078F DIAST BP <80 MM HG: CPT | Mod: HCNC,CPTII,S$GLB, | Performed by: INTERNAL MEDICINE

## 2023-02-14 PROCEDURE — 99999 PR PBB SHADOW E&M-EST. PATIENT-LVL IV: ICD-10-PCS | Mod: PBBFAC,HCNC,, | Performed by: RADIOLOGY

## 2023-02-14 PROCEDURE — 3078F PR MOST RECENT DIASTOLIC BLOOD PRESSURE < 80 MM HG: ICD-10-PCS | Mod: HCNC,CPTII,S$GLB, | Performed by: INTERNAL MEDICINE

## 2023-02-14 PROCEDURE — 3288F PR FALLS RISK ASSESSMENT DOCUMENTED: ICD-10-PCS | Mod: HCNC,CPTII,S$GLB, | Performed by: DERMATOLOGY

## 2023-02-14 PROCEDURE — 3288F PR FALLS RISK ASSESSMENT DOCUMENTED: ICD-10-PCS | Mod: HCNC,CPTII,S$GLB, | Performed by: RADIOLOGY

## 2023-02-14 PROCEDURE — 1101F PR PT FALLS ASSESS DOC 0-1 FALLS W/OUT INJ PAST YR: ICD-10-PCS | Mod: HCNC,CPTII,S$GLB, | Performed by: RADIOLOGY

## 2023-02-14 PROCEDURE — 99499 UNLISTED E&M SERVICE: CPT | Mod: HCNC,S$GLB,,

## 2023-02-14 PROCEDURE — 1125F AMNT PAIN NOTED PAIN PRSNT: CPT | Mod: HCNC,CPTII,S$GLB, | Performed by: INTERNAL MEDICINE

## 2023-02-14 PROCEDURE — 3077F PR MOST RECENT SYSTOLIC BLOOD PRESSURE >= 140 MM HG: ICD-10-PCS | Mod: HCNC,CPTII,S$GLB, | Performed by: INTERNAL MEDICINE

## 2023-02-14 PROCEDURE — 3078F DIAST BP <80 MM HG: CPT | Mod: HCNC,CPTII,S$GLB, | Performed by: RADIOLOGY

## 2023-02-14 PROCEDURE — 1126F PR PAIN SEVERITY QUANTIFIED, NO PAIN PRESENT: ICD-10-PCS | Mod: HCNC,CPTII,S$GLB, | Performed by: RADIOLOGY

## 2023-02-14 PROCEDURE — 99203 OFFICE O/P NEW LOW 30 MIN: CPT | Mod: HCNC,S$GLB,, | Performed by: RADIOLOGY

## 2023-02-14 PROCEDURE — 3077F SYST BP >= 140 MM HG: CPT | Mod: HCNC,CPTII,S$GLB, | Performed by: RADIOLOGY

## 2023-02-14 PROCEDURE — 99499 RISK ADDL DX/OHS AUDIT: ICD-10-PCS | Mod: HCNC,S$GLB,,

## 2023-02-14 PROCEDURE — 1126F PR PAIN SEVERITY QUANTIFIED, NO PAIN PRESENT: ICD-10-PCS | Mod: HCNC,CPTII,S$GLB, | Performed by: DERMATOLOGY

## 2023-02-14 PROCEDURE — 1126F AMNT PAIN NOTED NONE PRSNT: CPT | Mod: HCNC,CPTII,S$GLB, | Performed by: DERMATOLOGY

## 2023-02-14 PROCEDURE — 3072F PR LOW RISK FOR RETINOPATHY: ICD-10-PCS | Mod: HCNC,CPTII,S$GLB, | Performed by: RADIOLOGY

## 2023-02-14 PROCEDURE — 3288F FALL RISK ASSESSMENT DOCD: CPT | Mod: HCNC,CPTII,S$GLB, | Performed by: RADIOLOGY

## 2023-02-14 PROCEDURE — 1159F MED LIST DOCD IN RCRD: CPT | Mod: HCNC,CPTII,S$GLB, | Performed by: RADIOLOGY

## 2023-02-14 PROCEDURE — 3072F PR LOW RISK FOR RETINOPATHY: ICD-10-PCS | Mod: HCNC,CPTII,S$GLB, | Performed by: DERMATOLOGY

## 2023-02-14 PROCEDURE — 1126F AMNT PAIN NOTED NONE PRSNT: CPT | Mod: HCNC,CPTII,S$GLB, | Performed by: RADIOLOGY

## 2023-02-14 PROCEDURE — 1159F PR MEDICATION LIST DOCUMENTED IN MEDICAL RECORD: ICD-10-PCS | Mod: HCNC,CPTII,S$GLB, | Performed by: RADIOLOGY

## 2023-02-14 PROCEDURE — 3288F FALL RISK ASSESSMENT DOCD: CPT | Mod: HCNC,CPTII,S$GLB, | Performed by: DERMATOLOGY

## 2023-02-14 PROCEDURE — 99214 PR OFFICE/OUTPT VISIT, EST, LEVL IV, 30-39 MIN: ICD-10-PCS | Mod: HCNC,S$GLB,, | Performed by: INTERNAL MEDICINE

## 2023-02-14 PROCEDURE — 3077F SYST BP >= 140 MM HG: CPT | Mod: HCNC,CPTII,S$GLB, | Performed by: INTERNAL MEDICINE

## 2023-02-14 PROCEDURE — 99999 PR PBB SHADOW E&M-EST. PATIENT-LVL III: CPT | Mod: PBBFAC,HCNC,, | Performed by: DERMATOLOGY

## 2023-02-14 PROCEDURE — 3072F LOW RISK FOR RETINOPATHY: CPT | Mod: HCNC,CPTII,S$GLB, | Performed by: INTERNAL MEDICINE

## 2023-02-14 PROCEDURE — 3078F PR MOST RECENT DIASTOLIC BLOOD PRESSURE < 80 MM HG: ICD-10-PCS | Mod: HCNC,CPTII,S$GLB, | Performed by: RADIOLOGY

## 2023-02-14 RX ORDER — METOPROLOL SUCCINATE 25 MG/1
25 TABLET, EXTENDED RELEASE ORAL DAILY
Qty: 90 TABLET | Refills: 3 | Status: SHIPPED | OUTPATIENT
Start: 2023-02-14 | End: 2023-08-14 | Stop reason: SDUPTHER

## 2023-02-14 NOTE — PROGRESS NOTES
Subjective:       Patient ID: Dariel Urbina is a 82 y.o. male.    Chief Complaint: No chief complaint on file.    HPI:  This patient is referred for consultation regarding cardiac brachytherapy for prevention of instent restenosis.      Dariel Urbina is a 82 y.o. male with a history of CAD and is status post angioplasty with coronary artery stent placement.  The patient has a history of instent restenosis.  We are consulted for consideration of cardiac brachytherapy for prevention of restenosis.          Objective:      Physical Exam:  Patient is alert and is oriented to person, place, and time. He appears well-developed and well-nourished.      Assessment/Plan:       History of CAD with instent restenosis.  Discussed the rational for cardiac brachytherapy in this setting.  Discussed the procedures, risks and benefits of therapy.  Will proceed with treatment if appropriate.

## 2023-02-14 NOTE — PROGRESS NOTES
INR not at goal. Medications, chart, and patient findings reviewed. See calendar for adjustments to dose and follow up plan.  Pt has upcoming LHC 3/2. Pt being evaluated for MitraClip. Recommend to decrease maintenance dose at this time as currently pt is supratherapeutic. Plan to re-assess prior to C.

## 2023-02-14 NOTE — PROGRESS NOTES
82 y.o. male patient is here for suture removal following Mohs' surgery.    Patient reports no problems.    WOUND PE:  The vertex scalp sutures intact. Wound healing well. Good skin edges. No signs or symptoms of infection.    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    PLAN:  Sutures removed today by Haley Matthews, Surg Tech.   Steri-strips applied.  Keep moist with Aquaphor.  Call if any issues arise    RTC:  In 3-6 months with Usha Muñiz MD for skin check or sooner if new concern arises.

## 2023-02-14 NOTE — PROGRESS NOTES
Subjective     Chief Complaint: 3 month f/u    History of Present Illness:  Mr. Dariel Urbina is a 82 y.o. male with    S/p mechanical fall x2 weeks ago. Seen by urgent care and imaging showed closed fractures of 7/8th ribs. Patient does not take pain medication as he states pain is tolerable atis time. Lungs clear on exam with adequate air movement.     Squamous cell carcinoma of scalp s/p resection by dermatology 2/2023..     CAD s/p CABG/PCI. Patient currently has LÓPEZ, PET stress 1/17 positive in RCA/Lcx territory. Seen by cardiology in January for evaluation of Mitraclip. [facility] scheduled 3/2 as part of workup. Patient denies chest pain, palpations, or SOB at rest.    Mechanical AV on coumadin c/b recurrent nose bleeds requiring transfusions. Last INR 2.5. Patient continues to have nose bleeds daily but is able to control them with nasal packing and Affrin. Scheduled ENT appointment on 3/1/2022. Denies lightheadedness, visual changes, or syncopal episodes.     HFmEF on GDMT  (Toprol 25mg, Crestor 40mg, Imdur 60mg) and Bumex 1mg. Compliant with all medications. Has mild lower extremity nonpitting edema, more likely due to venous insufficiency rather than CHF. Patient sits in recliner due to recent rib fracture, does not elevate lower extremity most of the day. Does not wear compression stockings.    CKD3 which is stable  HTN controlled  Gout, on allopurinol without flares  DM2 controlled. Last HgA1c 5.2. Last eye exam 9/2022 and urine screening 11/2022.    History of adenomatous colon polyps. Last colonoscopy in 2018. GI with recommendation for repeat testing as well as EGD. Patient would like to postpone test at this time given rib fractures, dermatological condition, recurrent nose bleeds, and cardiology evaluation.     Patient also would like to get Shingles vaccine today. Repeat cholesterol panel with scheduled CMP/CBC scheduled for 2/28.          Review of Systems   Constitutional:  Negative for chills,  diaphoresis, fever, malaise/fatigue and weight loss.   HENT:  Positive for nosebleeds. Negative for congestion, sinus pain and sore throat.    Eyes:  Negative for blurred vision and double vision.   Respiratory:  Positive for shortness of breath. Negative for cough and hemoptysis.    Cardiovascular:  Negative for chest pain, palpitations and leg swelling.   Gastrointestinal:  Negative for abdominal pain, constipation, diarrhea, nausea and vomiting.   Genitourinary:  Negative for dysuria and frequency.   Musculoskeletal:  Positive for falls. Negative for joint pain and myalgias.   Neurological:  Negative for weakness and headaches.   Psychiatric/Behavioral:  Negative for memory loss. The patient does not have insomnia.      PAST HISTORY:     Past Medical History:   Diagnosis Date    Anemia of chronic renal failure, stage 3 (moderate) 05/27/2015    Anticoagulant long-term use     Atherosclerosis of coronary artery bypass graft of native heart without angina pectoris 09/11/2012    3-27-18 [facility] Two vessel coronary artery disease.   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    Bilateral carotid artery disease 02/09/2017    Bleeding from the nose     Bleeding nose 03/21/2018    Cataract     CKD (chronic kidney disease) stage 3, GFR 30-59 ml/min 05/27/2015    Claudication of left lower extremity 09/17/2014    Colon polyp     Encounter for blood transfusion     Gastroesophageal reflux disease without esophagitis 03/19/2018    Gastrointestinal hemorrhage associated with intestinal diverticulosis 04/01/2018    Glaucoma     H/O mechanical aortic valve replacement 09/17/2014    History of gout 09/26/2012    Hyperparathyroidism due to renal insufficiency 07/27/2015    Internal hemorrhoid 04/03/2018    Long term current use of anticoagulant therapy 09/26/2012    Mechanical heart valve present     Metabolic acidosis with normal anion gap and bicarbonate losses 03/20/2018    Mixed hyperlipidemia 09/26/2012     NSTEMI (non-ST elevated myocardial infarction) 03/21/2018    Obesity, diabetes, and hypertension syndrome 02/23/2016    Paroxysmal atrial fibrillation 2/6/2023    PVD (peripheral vascular disease) 09/11/2012    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       Past Surgical History:   Procedure Laterality Date    CARDIAC CATHETERIZATION      CARDIAC VALVE REPLACEMENT      CARDIAC VALVE SURGERY      CARPAL TUNNEL RELEASE Right 05/19/2020    Procedure: RELEASE, CARPAL TUNNEL;  Surgeon: Rupesh Norris Jr., MD;  Location: [facility];  Service: Plastics;  Laterality: Right;    CATARACT EXTRACTION Left 11/13/2022        COLON SURGERY      COLONOSCOPY N/A 03/31/2017    Procedure: COLONOSCOPY;  Surgeon: Bruno Raymond MD;  Location: [facility] (4TH FLR);  Service: Endoscopy;  Laterality: N/A;  Patient's wife requesting date.    COLONOSCOPY N/A 04/03/2018    Procedure: COLONOSCOPY;  Surgeon: Bonifacio Pelletier MD;  Location: [facility] (2ND FLR);  Service: Endoscopy;  Laterality: N/A;    COLONOSCOPY N/A 08/13/2018    Procedure: COLONOSCOPY;  Surgeon: Kam Barba MD;  Location: [facility] (2ND FLR);  Service: Endoscopy;  Laterality: N/A;  2nd floor: PA pressure 49; hx of moderate-severe valve disease     per Coumadin clinic-Patient can hold 5 days with lovenox bridge       ok to schedule per Katarina    CORONARY ANGIOGRAPHY N/A 10/04/2021    Procedure: Left heart cath +/- peripheral angiogram;  Surgeon: Jose Ruiz MD;  Location: [facility] CATH LAB;  Service: Cardiology;  Laterality: N/A;    CORONARY ANGIOPLASTY      CORONARY ARTERY BYPASS GRAFT      CORONARY BYPASS GRAFT ANGIOGRAPHY  10/04/2021    Procedure: Bypass graft  study;  Surgeon: Jose Ruiz MD;  Location: [facility] CATH LAB;  Service: Cardiology;;    HERNIA REPAIR      INTRAOCULAR PROSTHESES INSERTION Left 11/13/2022    Procedure: INSERTION, IOL PROSTHESIS;  Surgeon: Alia Mckeon MD;  Location: [address];  Service: Ophthalmology;  Laterality: Left;    INTRAOCULAR PROSTHESES INSERTION Right 12/4/2022    Procedure: INSERTION, IOL PROSTHESIS;  Surgeon: Alia Mckeon MD;  Location: [address];  Service: Ophthalmology;  Laterality: Right;    PHACOEMULSIFICATION OF CATARACT Left 11/13/2022    Procedure: PHACOEMULSIFICATION, CATARACT;  Surgeon: Alia Mckeon MD;  Location: [address];  Service: Ophthalmology;  Laterality: Left;    PHACOEMULSIFICATION OF CATARACT Right 12/4/2022    Procedure: PHACOEMULSIFICATION, CATARACT;  Surgeon: Alia Mckeon MD;  Location: [address];  Service: Ophthalmology;  Laterality: Right;    SPINE SURGERY      VASECTOMY         Family History   Problem Relation Age of Onset    Heart failure Mother     Heart disease Mother     Heart failure Father     Heart disease Father     Alcohol abuse Father     Heart failure Brother     Heart disease Brother     Diabetes Brother     No Known Problems Sister     No Known Problems Maternal Grandmother     No Known Problems Maternal Grandfather     No Known Problems Paternal Grandmother     No Known Problems Paternal Grandfather     Heart disease Sister     No Known Problems Maternal Aunt     No Known Problems Maternal Uncle     No Known Problems Paternal Aunt     No Known Problems Paternal Uncle     Amblyopia Neg Hx     Blindness Neg Hx     Cancer Neg Hx     Cataracts Neg Hx     Glaucoma Neg Hx     Hypertension Neg Hx     Macular degeneration Neg Hx     Retinal detachment Neg Hx     Strabismus Neg Hx     Stroke Neg Hx     Thyroid disease Neg Hx     Anemia Neg Hx     Arrhythmia Neg Hx     Asthma Neg Hx     Clotting disorder Neg Hx     Fainting Neg Hx     Heart attack Neg Hx      Hyperlipidemia Neg Hx     Atrial Septal Defect Neg Hx     Melanoma Neg Hx        Social History     Socioeconomic History    Marital status:      Spouse name: Ameena    Number of children: 3   Occupational History    Occupation: retired   Tobacco Use    Smoking status: Former     Packs/day: 1.00     Years: 20.00     Pack years: 20.00     Types: Cigarettes     Quit date: 1980     Years since quittin.4     Passive exposure: Never    Smokeless tobacco: Never   Substance and Sexual Activity    Alcohol use: No    Drug use: No    Sexual activity: Not Currently     Social Determinants of Health     Financial Resource Strain: Low Risk     Difficulty of Paying Living Expenses: Not hard at all   Food Insecurity: No Food Insecurity    Worried About Running Out of Food in the Last Year: Never true    Ran Out of Food in the Last Year: Never true   Transportation Needs: No Transportation Needs    Lack of Transportation (Medical): No    Lack of Transportation (Non-Medical): No   Physical Activity: Inactive    Days of Exercise per Week: 0 days    Minutes of Exercise per Session: 0 min   Stress: No Stress Concern Present    Feeling of Stress : Not at all   Social Connections: Moderately Integrated    Frequency of Communication with Friends and Family: Once a week    Frequency of Social Gatherings with Friends and Family: More than three times a week    Attends Faith Services: Never    Active Member of Clubs or Organizations: Yes    Attends Club or Organization Meetings: More than 4 times per year    Marital Status:    Housing Stability: Low Risk     Unable to Pay for Housing in the Last Year: No    Number of Places Lived in the Last Year: 1    Unstable Housing in the Last Year: No       MEDICATIONS & ALLERGIES:     Current Outpatient Medications on File Prior to Visit   Medication Sig    albuterol (VENTOLIN HFA) 90 mcg/actuation inhaler Inhale 2 puffs into the lungs every 6 (six) hours as needed for  Wheezing. Rescue    allopurinoL (ZYLOPRIM) 100 MG tablet TAKE 1 TABLET EVERY DAY    bumetanide (BUMEX) 1 MG tablet Take 1 tablet (1 mg total) by mouth once daily.    ferrous gluconate (FERGON) 324 MG tablet Take 1 tablet (324 mg total) by mouth daily with breakfast.    glimepiride (AMARYL) 1 MG tablet     isosorbide mononitrate (IMDUR) 60 MG 24 hr tablet Take 1 tablet (60 mg total) by mouth once daily.    loperamide (IMODIUM) 2 mg capsule Take 2 mg by mouth 4 (four) times daily as needed for Diarrhea.    midazolam (VERSED) 1 mg/mL injection     omega-3 fatty acids/fish oil (FISH OIL-OMEGA-3 FATTY ACIDS) 300-1,000 mg capsule Take 1 capsule by mouth once daily.    oxymetazoline (AFRIN) 0.05 % nasal spray Use 2 sprays by Nasal route daily as needed (nose bleed).    rosuvastatin (CRESTOR) 40 MG Tab TAKE 1 TABLET EVERY EVENING.    tramadol-acetaminophen 37.5-325 mg (ULTRACET) 37.5-325 mg Tab 1 tablet by Per J Tube route every 12 (twelve) hours as needed for Pain (pain).    warfarin (COUMADIN) 5 MG tablet warfarin 7.5 (1.5 tablets) sun and Thursday, and 5mg other days    [DISCONTINUED] carvediloL (COREG) 12.5 MG tablet     [DISCONTINUED] metoprolol succinate (TOPROL-XL) 25 MG 24 hr tablet Take 1 tablet (25 mg total) by mouth once daily.    lisinopriL (PRINIVIL,ZESTRIL) 2.5 MG tablet Take 1 tablet (2.5 mg total) by mouth once daily.    [DISCONTINUED] HYDROcodone-acetaminophen (NORCO) 5-325 mg per tablet Take 1 tablet by mouth every 6 (six) hours as needed for Pain. (Patient not taking: Reported on 2/14/2023)     Current Facility-Administered Medications on File Prior to Visit   Medication    ondansetron injection 4 mg    ondansetron injection 4 mg    phenylephrine HCL 2.5% ophthalmic solution 1 drop    phenylephrine HCL 2.5% ophthalmic solution 1 drop    proparacaine 0.5 % ophthalmic solution 1 drop    proparacaine 0.5 % ophthalmic solution 1 drop    tropicamide 1% ophthalmic solution 1 drop    tropicamide 1% ophthalmic  "solution 1 drop       Review of patient's allergies indicates:   Allergen Reactions    Fosinopril      Intolerance- elevates potassium level      Losartan      Intolerance- elevates potassium level       OBJECTIVE:     Vital Signs:  Vitals:    02/14/23 0936   BP: (!) 140/76   BP Location: Right arm   Patient Position: Sitting   BP Method: Medium (Manual)   Pulse: 86   SpO2: 97%   Weight: 79.9 kg (176 lb 2.4 oz)   Height: 5' 5" (1.651 m)       Body mass index is 29.31 kg/m².     Physical Exam  Vitals reviewed.   Constitutional:       General: He is not in acute distress.     Appearance: Normal appearance. He is normal weight. He is not ill-appearing.   HENT:      Head:      Comments: S/p resection of SCC. Sutures in place.      Nose:      Comments: Nasal packing     Mouth/Throat:      Mouth: Mucous membranes are moist.      Pharynx: Oropharynx is clear. No oropharyngeal exudate.   Eyes:      General: No scleral icterus.     Extraocular Movements: Extraocular movements intact.      Pupils: Pupils are equal, round, and reactive to light.   Cardiovascular:      Rate and Rhythm: Normal rate and regular rhythm.      Pulses: Normal pulses.      Heart sounds: Normal heart sounds. No murmur heard.    No friction rub.   Pulmonary:      Effort: Pulmonary effort is normal. No respiratory distress.      Breath sounds: Normal breath sounds. No wheezing.   Chest:      Chest wall: Tenderness present.   Abdominal:      General: Abdomen is flat. There is no distension.      Palpations: Abdomen is soft.      Tenderness: There is no abdominal tenderness. There is no guarding.   Musculoskeletal:         General: Signs of injury present. No swelling or tenderness. Normal range of motion.      Cervical back: Normal range of motion and neck supple. No rigidity or tenderness.      Right lower leg: Edema present.      Left lower leg: Edema present.      Comments: Mild non pitting edema of lower extremities bilaterally   Skin:     General: " Skin is warm.      Capillary Refill: Capillary refill takes less than 2 seconds.      Coloration: Skin is not jaundiced.   Neurological:      General: No focal deficit present.      Mental Status: He is alert and oriented to person, place, and time. Mental status is at baseline.       Laboratory  Recent Results (from the past 24 hour(s))   Protime-INR    Collection Time: 02/14/23  8:23 AM   Result Value Ref Range    Prothrombin Time 35.6 (H) 9.0 - 12.5 sec    INR 3.7 (H) 0.8 - 1.2       Diagnostic Results:      Health Maintenance Due   Topic Date Due    Shingles Vaccine (1 of 2) Never done    COVID-19 Vaccine (5 - Booster for Pfizer series) 07/05/2022    Lipid Panel  02/10/2023         ASSESSMENT & PLAN:     Hypertension associated with diabetes    Type 2 diabetes mellitus with stage 3b chronic kidney disease, without long-term current use of insulin    Congestive heart failure, unspecified HF chronicity, unspecified heart failure type    Stage 3b chronic kidney disease    H/O mechanical aortic valve replacement    Epistaxis    LÓPEZ (dyspnea on exertion)    Need for vaccination    Anemia, unspecified type    Bilateral carotid artery stenosis  -     Lipid Panel; Future; Expected date: 02/14/2023    Coronary artery disease involving native coronary artery of native heart without angina pectoris  -     Lipid Panel; Future; Expected date: 02/14/2023    Other orders  -     metoprolol succinate (TOPROL-XL) 25 MG 24 hr tablet; Take 1 tablet (25 mg total) by mouth once daily.  Dispense: 90 tablet; Refill: 3        See above for further detail.    RTC in 3 months    Discussed with Dr Langston  - staff attestation to follow      Nikki Ryan  Internal Medicine PGY1

## 2023-02-17 NOTE — PROGRESS NOTES
PHARMACY CONSULT NOTE: WARFARIN  Dariel Urbina is a 82 y.o. male on warfarin therapy for Mechanical Heart Valve (aortic). PharmD has been consulted for warfarin dosing. Of note, patient admitted with Epistaxis.     Current order: held  Home dose: 5 mg every Mon, Fri; 7.5 mg all other days  Coumadin clinic enrollment: Active  INR goal: 2-3    Lab Results   Component Value Date    INR 2.3 (H) 12/23/2022    INR 3.4 (H) 12/22/2022    INR 4.0 (H) 12/21/2022     Diet: Adult Cardiac with Boost supplements     Recommendation(s):   INR within goal range, if safe from bleeding perspective, would resume warfarin 5mg daily.  If not, would start low intensity heparin drip.    INR daily (ordered)  Pharmacy will continue to follow and monitor warfarin    Thank you for the consult,  Amna Salgado, PharmD, BCPS  [id number]    **Note: Consults are reviewed Monday-Friday 7:00am-3:30pm. THE ABOVE RECOMMENDATIONS ARE ONLY SUGGESTED.THE RECOMMENDATIONS SHOULD BE CONSIDERED IN CONJUNCTION WITH ALL PATIENT FACTORS. **     [FreeTextEntry6] : Flexible scope #2 was used. Right nasal passage with eroded inferior, normal middle and superior turbinates. Nasal passage patent with clear middle meatus and sphenoethmoid recess. Left nasal passage with eroded inferior, normal middle and superior turbinates. Nasal passage was patent with clear middle meatus and sphenoethmoid recess. No mucopurulence or polyps appreciated. + anterior septal perforation with crusting. Nasopharynx clear.

## 2023-02-20 ENCOUNTER — TELEPHONE (OUTPATIENT)
Dept: ENDOSCOPY | Facility: HOSPITAL | Age: 83
End: 2023-02-20
Payer: MEDICARE

## 2023-02-20 DIAGNOSIS — N18.9 CHRONIC RENAL IMPAIRMENT, UNSPECIFIED CKD STAGE: ICD-10-CM

## 2023-02-20 DIAGNOSIS — R19.7 DIARRHEA, UNSPECIFIED TYPE: Primary | ICD-10-CM

## 2023-02-20 DIAGNOSIS — R55 SYNCOPE, UNSPECIFIED SYNCOPE TYPE: ICD-10-CM

## 2023-02-20 DIAGNOSIS — D50.8 OTHER IRON DEFICIENCY ANEMIA: ICD-10-CM

## 2023-02-20 DIAGNOSIS — D50.9 IRON DEFICIENCY ANEMIA, UNSPECIFIED IRON DEFICIENCY ANEMIA TYPE: ICD-10-CM

## 2023-02-20 PROBLEM — D69.6 THROMBOCYTOPENIA: Status: RESOLVED | Noted: 2021-11-10 | Resolved: 2023-02-20

## 2023-02-20 NOTE — TELEPHONE ENCOUNTER
Contacted patient for follow up on scheduling EGD. Spoke to wife, Ameena.  He was seen by Cardiology and has a procedure on 3/2/23.  They want to wait awhile until after his procedure.  Follow up PAT appointment scheduled.

## 2023-02-20 NOTE — PROGRESS NOTES
"I have personally taken the history and examined this patient and agree with the resident's note as stated above with the following thoughts:  BP (!) 140/76 (BP Location: Right arm, Patient Position: Sitting, BP Method: Medium (Manual))   Pulse 86   Ht 5' 5" (1.651 m)   Wt 79.9 kg (176 lb 2.4 oz)   SpO2 97%   BMI 29.31 kg/m²     Discussed getting vaccines up to date.  Discussed importance of low fat diet and exercise  .  We discussed fall prevention.  Recent platelet counts have been normal so he no longer has thrombocytopenia.  He still has some hyperparathyroidism due to his chronic kidney disease.  We will continue monitoring and treating this appropriately.  Recent estimated GFR was 29.4.  "

## 2023-02-23 ENCOUNTER — ANTI-COAG VISIT (OUTPATIENT)
Dept: CARDIOLOGY | Facility: CLINIC | Age: 83
End: 2023-02-23
Payer: MEDICARE

## 2023-02-23 ENCOUNTER — LAB VISIT (OUTPATIENT)
Dept: LAB | Facility: HOSPITAL | Age: 83
End: 2023-02-23
Payer: MEDICARE

## 2023-02-23 DIAGNOSIS — Z95.2 H/O MECHANICAL AORTIC VALVE REPLACEMENT: ICD-10-CM

## 2023-02-23 DIAGNOSIS — Z79.01 LONG TERM CURRENT USE OF ANTICOAGULANT THERAPY: ICD-10-CM

## 2023-02-23 DIAGNOSIS — Z79.01 ANTICOAGULANT LONG-TERM USE: Primary | ICD-10-CM

## 2023-02-23 LAB
INR PPP: 2.4 (ref 0.8–1.2)
PROTHROMBIN TIME: 23.9 SEC (ref 9–12.5)

## 2023-02-23 PROCEDURE — 85610 PROTHROMBIN TIME: CPT | Mod: HCNC | Performed by: INTERNAL MEDICINE

## 2023-02-23 PROCEDURE — 93793 ANTICOAG MGMT PT WARFARIN: CPT | Mod: S$GLB,,,

## 2023-02-23 PROCEDURE — 36415 COLL VENOUS BLD VENIPUNCTURE: CPT | Mod: HCNC | Performed by: INTERNAL MEDICINE

## 2023-02-23 PROCEDURE — 93793 PR ANTICOAGULANT MGMT FOR PT TAKING WARFARIN: ICD-10-PCS | Mod: S$GLB,,,

## 2023-02-23 NOTE — PROGRESS NOTES
INR at goal. Medications and chart reviewed. No changes noted to necessitate adjustment of warfarin or follow-up plan. See calendar.  Pt has pending C.  Recommend to dose per calendar & will re-assess.

## 2023-02-28 ENCOUNTER — LAB VISIT (OUTPATIENT)
Dept: LAB | Facility: HOSPITAL | Age: 83
End: 2023-02-28
Attending: INTERNAL MEDICINE
Payer: MEDICARE

## 2023-02-28 DIAGNOSIS — Z95.2 H/O MECHANICAL AORTIC VALVE REPLACEMENT: ICD-10-CM

## 2023-02-28 DIAGNOSIS — I25.10 CORONARY ARTERY DISEASE INVOLVING NATIVE CORONARY ARTERY OF NATIVE HEART WITHOUT ANGINA PECTORIS: ICD-10-CM

## 2023-02-28 DIAGNOSIS — I65.23 BILATERAL CAROTID ARTERY STENOSIS: ICD-10-CM

## 2023-02-28 LAB
ALBUMIN SERPL BCP-MCNC: 3.7 G/DL (ref 3.5–5.2)
ALP SERPL-CCNC: 65 U/L (ref 55–135)
ALT SERPL W/O P-5'-P-CCNC: 15 U/L (ref 10–44)
ANION GAP SERPL CALC-SCNC: 7 MMOL/L (ref 8–16)
AST SERPL-CCNC: 17 U/L (ref 10–40)
BASOPHILS # BLD AUTO: 0.05 K/UL (ref 0–0.2)
BASOPHILS NFR BLD: 1 % (ref 0–1.9)
BILIRUB SERPL-MCNC: 0.5 MG/DL (ref 0.1–1)
BUN SERPL-MCNC: 46 MG/DL (ref 8–23)
CALCIUM SERPL-MCNC: 10.4 MG/DL (ref 8.7–10.5)
CHLORIDE SERPL-SCNC: 112 MMOL/L (ref 95–110)
CHOLEST SERPL-MCNC: 115 MG/DL (ref 120–199)
CHOLEST/HDLC SERPL: 3.1 {RATIO} (ref 2–5)
CO2 SERPL-SCNC: 22 MMOL/L (ref 23–29)
CREAT SERPL-MCNC: 2.2 MG/DL (ref 0.5–1.4)
DIFFERENTIAL METHOD: ABNORMAL
EOSINOPHIL # BLD AUTO: 0.2 K/UL (ref 0–0.5)
EOSINOPHIL NFR BLD: 3.4 % (ref 0–8)
ERYTHROCYTE [DISTWIDTH] IN BLOOD BY AUTOMATED COUNT: 13.4 % (ref 11.5–14.5)
EST. GFR  (NO RACE VARIABLE): 29.4 ML/MIN/1.73 M^2
GLUCOSE SERPL-MCNC: 88 MG/DL (ref 70–110)
HCT VFR BLD AUTO: 32.8 % (ref 40–54)
HDLC SERPL-MCNC: 37 MG/DL (ref 40–75)
HDLC SERPL: 32.2 % (ref 20–50)
HGB BLD-MCNC: 10.2 G/DL (ref 14–18)
IMM GRANULOCYTES # BLD AUTO: 0.01 K/UL (ref 0–0.04)
IMM GRANULOCYTES NFR BLD AUTO: 0.2 % (ref 0–0.5)
INR PPP: 2.5 (ref 0.8–1.2)
LDLC SERPL CALC-MCNC: 47 MG/DL (ref 63–159)
LYMPHOCYTES # BLD AUTO: 1.2 K/UL (ref 1–4.8)
LYMPHOCYTES NFR BLD: 24.6 % (ref 18–48)
MCH RBC QN AUTO: 30.2 PG (ref 27–31)
MCHC RBC AUTO-ENTMCNC: 31.1 G/DL (ref 32–36)
MCV RBC AUTO: 97 FL (ref 82–98)
MONOCYTES # BLD AUTO: 0.5 K/UL (ref 0.3–1)
MONOCYTES NFR BLD: 10.4 % (ref 4–15)
NEUTROPHILS # BLD AUTO: 3 K/UL (ref 1.8–7.7)
NEUTROPHILS NFR BLD: 60.4 % (ref 38–73)
NONHDLC SERPL-MCNC: 78 MG/DL
NRBC BLD-RTO: 0 /100 WBC
PLATELET # BLD AUTO: 150 K/UL (ref 150–450)
PMV BLD AUTO: 11.5 FL (ref 9.2–12.9)
POTASSIUM SERPL-SCNC: 5.8 MMOL/L (ref 3.5–5.1)
PROT SERPL-MCNC: 7.6 G/DL (ref 6–8.4)
PROTHROMBIN TIME: 24.6 SEC (ref 9–12.5)
RBC # BLD AUTO: 3.38 M/UL (ref 4.6–6.2)
SODIUM SERPL-SCNC: 141 MMOL/L (ref 136–145)
TRIGL SERPL-MCNC: 155 MG/DL (ref 30–150)
WBC # BLD AUTO: 5 K/UL (ref 3.9–12.7)

## 2023-02-28 PROCEDURE — 85025 COMPLETE CBC W/AUTO DIFF WBC: CPT | Mod: HCNC | Performed by: INTERNAL MEDICINE

## 2023-02-28 PROCEDURE — 80053 COMPREHEN METABOLIC PANEL: CPT | Mod: HCNC | Performed by: INTERNAL MEDICINE

## 2023-02-28 PROCEDURE — 80061 LIPID PANEL: CPT | Mod: HCNC | Performed by: INTERNAL MEDICINE

## 2023-02-28 PROCEDURE — 36415 COLL VENOUS BLD VENIPUNCTURE: CPT | Mod: HCNC | Performed by: INTERNAL MEDICINE

## 2023-02-28 PROCEDURE — 85610 PROTHROMBIN TIME: CPT | Mod: HCNC | Performed by: INTERNAL MEDICINE

## 2023-03-01 ENCOUNTER — OFFICE VISIT (OUTPATIENT)
Dept: OTOLARYNGOLOGY | Facility: CLINIC | Age: 83
End: 2023-03-01
Payer: MEDICARE

## 2023-03-01 VITALS — WEIGHT: 171 LBS | HEIGHT: 65 IN | BODY MASS INDEX: 28.49 KG/M2

## 2023-03-01 DIAGNOSIS — R04.0 RECURRENT EPISTAXIS: Primary | ICD-10-CM

## 2023-03-01 DIAGNOSIS — Z79.01 ANTICOAGULATED BY ANTICOAGULATION TREATMENT: ICD-10-CM

## 2023-03-01 PROCEDURE — 3072F LOW RISK FOR RETINOPATHY: CPT | Mod: HCNC,CPTII,S$GLB, | Performed by: OTOLARYNGOLOGY

## 2023-03-01 PROCEDURE — 1159F PR MEDICATION LIST DOCUMENTED IN MEDICAL RECORD: ICD-10-PCS | Mod: HCNC,CPTII,S$GLB, | Performed by: OTOLARYNGOLOGY

## 2023-03-01 PROCEDURE — 1159F MED LIST DOCD IN RCRD: CPT | Mod: HCNC,CPTII,S$GLB, | Performed by: OTOLARYNGOLOGY

## 2023-03-01 PROCEDURE — 1160F RVW MEDS BY RX/DR IN RCRD: CPT | Mod: HCNC,CPTII,S$GLB, | Performed by: OTOLARYNGOLOGY

## 2023-03-01 PROCEDURE — 99999 PR PBB SHADOW E&M-EST. PATIENT-LVL III: CPT | Mod: PBBFAC,HCNC,, | Performed by: OTOLARYNGOLOGY

## 2023-03-01 PROCEDURE — 1125F PR PAIN SEVERITY QUANTIFIED, PAIN PRESENT: ICD-10-PCS | Mod: HCNC,CPTII,S$GLB, | Performed by: OTOLARYNGOLOGY

## 2023-03-01 PROCEDURE — 1101F PT FALLS ASSESS-DOCD LE1/YR: CPT | Mod: HCNC,CPTII,S$GLB, | Performed by: OTOLARYNGOLOGY

## 2023-03-01 PROCEDURE — 30903 CONTROL OF NOSEBLEED: CPT | Mod: HCNC,LT,S$GLB, | Performed by: OTOLARYNGOLOGY

## 2023-03-01 PROCEDURE — 3072F PR LOW RISK FOR RETINOPATHY: ICD-10-PCS | Mod: HCNC,CPTII,S$GLB, | Performed by: OTOLARYNGOLOGY

## 2023-03-01 PROCEDURE — 1125F AMNT PAIN NOTED PAIN PRSNT: CPT | Mod: HCNC,CPTII,S$GLB, | Performed by: OTOLARYNGOLOGY

## 2023-03-01 PROCEDURE — 1101F PR PT FALLS ASSESS DOC 0-1 FALLS W/OUT INJ PAST YR: ICD-10-PCS | Mod: HCNC,CPTII,S$GLB, | Performed by: OTOLARYNGOLOGY

## 2023-03-01 PROCEDURE — 1160F PR REVIEW ALL MEDS BY PRESCRIBER/CLIN PHARMACIST DOCUMENTED: ICD-10-PCS | Mod: HCNC,CPTII,S$GLB, | Performed by: OTOLARYNGOLOGY

## 2023-03-01 PROCEDURE — 3288F PR FALLS RISK ASSESSMENT DOCUMENTED: ICD-10-PCS | Mod: HCNC,CPTII,S$GLB, | Performed by: OTOLARYNGOLOGY

## 2023-03-01 PROCEDURE — 3288F FALL RISK ASSESSMENT DOCD: CPT | Mod: HCNC,CPTII,S$GLB, | Performed by: OTOLARYNGOLOGY

## 2023-03-01 PROCEDURE — 99213 OFFICE O/P EST LOW 20 MIN: CPT | Mod: 25,HCNC,S$GLB, | Performed by: OTOLARYNGOLOGY

## 2023-03-01 PROCEDURE — 30903 PR CTRL NOSEBLEED,ANTER,COMPLEX: ICD-10-PCS | Mod: HCNC,LT,S$GLB, | Performed by: OTOLARYNGOLOGY

## 2023-03-01 PROCEDURE — 99213 PR OFFICE/OUTPT VISIT, EST, LEVL III, 20-29 MIN: ICD-10-PCS | Mod: 25,HCNC,S$GLB, | Performed by: OTOLARYNGOLOGY

## 2023-03-01 PROCEDURE — 99999 PR PBB SHADOW E&M-EST. PATIENT-LVL III: ICD-10-PCS | Mod: PBBFAC,HCNC,, | Performed by: OTOLARYNGOLOGY

## 2023-03-01 NOTE — PROGRESS NOTES
"  Subjective:      Dariel is a 82 y.o. male who comes for follow-up of nosebleeds.  His last visit with me was on 11/30/2022.  After last cauterization lasted about 2 weeks before next nosebleed.  Since then had admission to hospital and transfusion.  Now continued left-sided bleeding, most recently 1 hour ago.        %       The patient's medications, allergies, past medical, surgical, social and family histories were reviewed and updated as appropriate.    A detailed review of systems was obtained with pertinent positives as per the above HPI, and otherwise negative.        Objective:     Ht 5' 5" (1.651 m)   Wt 77.6 kg (171 lb)   BMI 28.46 kg/m²        Constitutional:   He appears well-developed. He is cooperative.     Head:  Normocephalic.     Nose:  No mucosal edema, rhinorrhea, septal deviation or polyps. No epistaxis. Turbinates normal, no turbinate masses and no turbinate hypertrophy.  Right sinus exhibits no maxillary sinus tenderness and no frontal sinus tenderness. Left sinus exhibits no maxillary sinus tenderness and no frontal sinus tenderness.   0.3-0.4 cm hemorrhagic plaques on nasal dorsum  Isolated similar plaque on left caudal nasal septum at anterior nasal spine  Crusted blood around vestibule  Remainder of nasal cavity clear    Mouth/Throat  Oropharynx clear and moist without lesions or asymmetry. No oropharyngeal exudate or posterior oropharyngeal erythema.     Neck:  No adenopathy. Normal range of motion present.     He has no cervical adenopathy.     Procedure    Control of Epistaxis    Indication:  Epistaxis    Informed consent was obtained prior to proceeding.  The left nasal cavity was anesthetized with topical 4% lidocaine.  Bleeding was localized to the left nasal cavity  along the maxillary crest and nasal spine  and cauterized with electrocautery with bipolar forceps.        The patient tolerated the procedure well.        Data Reviewed    WBC (K/uL)   Date Value   02/28/2023 5.00 "     Eosinophil % (%)   Date Value   02/28/2023 3.4     Eos # (K/uL)   Date Value   02/28/2023 0.2     Platelets (K/uL)   Date Value   02/28/2023 150     Glucose (mg/dL)   Date Value   02/28/2023 88     No results found for: IGE        Assessment:     1. Recurrent epistaxis    2. Anticoagulated by anticoagulation treatment         Plan:     Discussed local care.  Follow up if symptoms worsen or fail to improve.

## 2023-03-02 ENCOUNTER — HOSPITAL ENCOUNTER (OUTPATIENT)
Facility: HOSPITAL | Age: 83
Discharge: HOME | End: 2023-03-02
Attending: INTERNAL MEDICINE | Admitting: INTERNAL MEDICINE
Payer: MEDICARE

## 2023-03-02 ENCOUNTER — DOCUMENTATION ONLY (OUTPATIENT)
Dept: CARDIOLOGY | Facility: CLINIC | Age: 83
End: 2023-03-02
Payer: MEDICARE

## 2023-03-02 VITALS
SYSTOLIC BLOOD PRESSURE: 153 MMHG | TEMPERATURE: 98 F | HEIGHT: 65 IN | BODY MASS INDEX: 28.49 KG/M2 | OXYGEN SATURATION: 99 % | WEIGHT: 171 LBS | RESPIRATION RATE: 18 BRPM | HEART RATE: 80 BPM | DIASTOLIC BLOOD PRESSURE: 72 MMHG

## 2023-03-02 DIAGNOSIS — I25.10 CORONARY ARTERY DISEASE INVOLVING NATIVE CORONARY ARTERY OF NATIVE HEART WITHOUT ANGINA PECTORIS: Primary | ICD-10-CM

## 2023-03-02 DIAGNOSIS — Z98.890 STATUS POST LEFT HEART CATHETERIZATION: ICD-10-CM

## 2023-03-02 DIAGNOSIS — Z95.2 H/O MECHANICAL AORTIC VALVE REPLACEMENT: ICD-10-CM

## 2023-03-02 DIAGNOSIS — Z51.89 ENCOUNTER FOR OTHER SPECIFIED AFTERCARE: ICD-10-CM

## 2023-03-02 LAB
ABO + RH BLD: NORMAL
ANION GAP SERPL CALC-SCNC: 7 MMOL/L (ref 8–16)
BLD GP AB SCN CELLS X3 SERPL QL: NORMAL
BUN SERPL-MCNC: 52 MG/DL (ref 8–23)
CALCIUM SERPL-MCNC: 10.8 MG/DL (ref 8.7–10.5)
CHLORIDE SERPL-SCNC: 110 MMOL/L (ref 95–110)
CO2 SERPL-SCNC: 23 MMOL/L (ref 23–29)
CREAT SERPL-MCNC: 2.3 MG/DL (ref 0.5–1.4)
EST. GFR  (NO RACE VARIABLE): 27.8 ML/MIN/1.73 M^2
GLUCOSE SERPL-MCNC: 94 MG/DL (ref 70–110)
INR PPP: 1.5 (ref 0.8–1.2)
POCT GLUCOSE: 106 MG/DL (ref 70–110)
POTASSIUM SERPL-SCNC: 5.4 MMOL/L (ref 3.5–5.1)
PROTHROMBIN TIME: 15.1 SEC (ref 9–12.5)
SODIUM SERPL-SCNC: 140 MMOL/L (ref 136–145)

## 2023-03-02 PROCEDURE — 93455 PR CATH PLACE/CORONARY ANGIO, IMG SUPER/INTERP, BYPASS ANGIO: ICD-10-PCS | Mod: 26,HCNC,, | Performed by: INTERNAL MEDICINE

## 2023-03-02 PROCEDURE — 99152 MOD SED SAME PHYS/QHP 5/>YRS: CPT | Mod: HCNC | Performed by: INTERNAL MEDICINE

## 2023-03-02 PROCEDURE — 25000003 PHARM REV CODE 250: Mod: HCNC | Performed by: STUDENT IN AN ORGANIZED HEALTH CARE EDUCATION/TRAINING PROGRAM

## 2023-03-02 PROCEDURE — 93455 CORONARY ART/GRFT ANGIO S&I: CPT | Mod: HCNC | Performed by: INTERNAL MEDICINE

## 2023-03-02 PROCEDURE — 25000003 PHARM REV CODE 250: Mod: HCNC | Performed by: INTERNAL MEDICINE

## 2023-03-02 PROCEDURE — 93010 EKG 12-LEAD: ICD-10-PCS | Mod: HCNC,,, | Performed by: INTERNAL MEDICINE

## 2023-03-02 PROCEDURE — 93010 ELECTROCARDIOGRAM REPORT: CPT | Mod: HCNC,,, | Performed by: INTERNAL MEDICINE

## 2023-03-02 PROCEDURE — C1894 INTRO/SHEATH, NON-LASER: HCPCS | Mod: HCNC | Performed by: INTERNAL MEDICINE

## 2023-03-02 PROCEDURE — 99152 PR MOD CONSCIOUS SEDATION, SAME PHYS, 5+ YRS, FIRST 15 MIN: ICD-10-PCS | Mod: HCNC,,, | Performed by: INTERNAL MEDICINE

## 2023-03-02 PROCEDURE — 80048 BASIC METABOLIC PNL TOTAL CA: CPT | Mod: HCNC | Performed by: INTERNAL MEDICINE

## 2023-03-02 PROCEDURE — 99152 MOD SED SAME PHYS/QHP 5/>YRS: CPT | Mod: HCNC,,, | Performed by: INTERNAL MEDICINE

## 2023-03-02 PROCEDURE — 93005 ELECTROCARDIOGRAM TRACING: CPT | Mod: HCNC

## 2023-03-02 PROCEDURE — 93455 CORONARY ART/GRFT ANGIO S&I: CPT | Mod: 26,HCNC,, | Performed by: INTERNAL MEDICINE

## 2023-03-02 PROCEDURE — C1769 GUIDE WIRE: HCPCS | Mod: HCNC | Performed by: INTERNAL MEDICINE

## 2023-03-02 PROCEDURE — 85610 PROTHROMBIN TIME: CPT | Mod: HCNC | Performed by: INTERNAL MEDICINE

## 2023-03-02 PROCEDURE — 25500020 PHARM REV CODE 255: Mod: HCNC | Performed by: INTERNAL MEDICINE

## 2023-03-02 PROCEDURE — 86900 BLOOD TYPING SEROLOGIC ABO: CPT | Mod: HCNC | Performed by: INTERNAL MEDICINE

## 2023-03-02 PROCEDURE — 63600175 PHARM REV CODE 636 W HCPCS: Mod: HCNC | Performed by: INTERNAL MEDICINE

## 2023-03-02 RX ORDER — ASPIRIN 325 MG
325 TABLET ORAL DAILY
Status: DISCONTINUED | OUTPATIENT
Start: 2023-03-02 | End: 2023-03-02 | Stop reason: HOSPADM

## 2023-03-02 RX ORDER — CLOPIDOGREL 300 MG/1
600 TABLET, FILM COATED ORAL ONCE
Status: COMPLETED | OUTPATIENT
Start: 2023-03-02 | End: 2023-03-02

## 2023-03-02 RX ORDER — DIPHENHYDRAMINE HCL 50 MG
50 CAPSULE ORAL ONCE
Status: COMPLETED | OUTPATIENT
Start: 2023-03-02 | End: 2023-03-02

## 2023-03-02 RX ORDER — SODIUM CHLORIDE 9 MG/ML
INJECTION, SOLUTION INTRAVENOUS CONTINUOUS
Status: DISCONTINUED | OUTPATIENT
Start: 2023-03-02 | End: 2023-03-02 | Stop reason: HOSPADM

## 2023-03-02 RX ORDER — FENTANYL CITRATE 50 UG/ML
INJECTION, SOLUTION INTRAMUSCULAR; INTRAVENOUS
Status: DISCONTINUED | OUTPATIENT
Start: 2023-03-02 | End: 2023-03-02 | Stop reason: HOSPADM

## 2023-03-02 RX ORDER — SODIUM CHLORIDE 9 MG/ML
INJECTION, SOLUTION INTRAVENOUS CONTINUOUS
Status: DISCONTINUED | OUTPATIENT
Start: 2023-03-02 | End: 2023-05-16

## 2023-03-02 RX ORDER — LIDOCAINE HYDROCHLORIDE 10 MG/ML
INJECTION INFILTRATION; PERINEURAL
Status: DISCONTINUED | OUTPATIENT
Start: 2023-03-02 | End: 2023-03-02 | Stop reason: HOSPADM

## 2023-03-02 RX ORDER — MIDAZOLAM HYDROCHLORIDE 1 MG/ML
INJECTION INTRAMUSCULAR; INTRAVENOUS
Status: DISCONTINUED | OUTPATIENT
Start: 2023-03-02 | End: 2023-03-02 | Stop reason: HOSPADM

## 2023-03-02 RX ORDER — HEPARIN SOD,PORCINE/0.9 % NACL 1000/500ML
INTRAVENOUS SOLUTION INTRAVENOUS
Status: DISCONTINUED | OUTPATIENT
Start: 2023-03-02 | End: 2023-03-02 | Stop reason: HOSPADM

## 2023-03-02 RX ADMIN — SODIUM CHLORIDE: 9 INJECTION, SOLUTION INTRAVENOUS at 08:03

## 2023-03-02 RX ADMIN — ASPIRIN 325 MG: 325 TABLET ORAL at 08:03

## 2023-03-02 RX ADMIN — CLOPIDOGREL BISULFATE 600 MG: 300 TABLET, FILM COATED ORAL at 08:03

## 2023-03-02 RX ADMIN — DIPHENHYDRAMINE HYDROCHLORIDE 50 MG: 50 CAPSULE ORAL at 08:03

## 2023-03-02 NOTE — DISCHARGE INSTRUCTIONS
For 5 days:    No lifting anything over 5 pounds.   No pushing or pulling heavy objects.  Showers only for 5 days.  Stairs as tolerated.  Do not strain.    Dressing may be removed tomorrow afternoon. Wash site gently with mild soap and water. Pat dry. No other dressing necessary to cover site. Do NOT put any lotions or ointments over the site.  Keep an eye on the left groin region for 5 days. Any signs of infection (redness, swelling, heat to site, temp 101.0 or higher, or pus/drainage) notify Dr. Devon Garnica office [phone].    If bleeding occurs in the next 24 hours lay flat and apply pressure to the site with 2-3 fingers. After 10 minutes check under dressing to see if bleeding has stopped. If it has stopped remain flat for 1 hour before resuming activity. If bleeding does not stop after 20 minutes of applying pressure, continue to hold pressure and call 911. DO NOT try to hold pressure and drive yourself to the ER.

## 2023-03-02 NOTE — PLAN OF CARE
Pt arrived to unit accompanied by his spouse.  Pre op orders and assessment initiated.  R groin site red rash noted and l groin has mild redness - Dr. Erickson notified.  Pt remains npo.  Call bell within reach.

## 2023-03-02 NOTE — BRIEF OP NOTE
Brief Operative Note:    : Devon Garnica MD     Referring Physician: Brant Devine     All Operators: Surgeon(s):  MD Devon Rosado MD     Preoperative Diagnosis: H/O mechanical aortic valve replacement [Z95.2]     Postop Diagnosis: H/O mechanical aortic valve replacement [Z95.2]    Treatments/Procedures: Procedure(s) (LRB):  Angiogram, Coronary (N/A)  Bypass graft study    Access: Left CFA    Findings:Severe coronary artery disease is present.     See catheterization report for full details.    Intervention: none,  of LCX stent, patent LIMA-LAD     See catheterization report for full details.    Closure device: Manual pressure       Plan:  - Post cath protocol   - IVF @ 150 cc/kg/hr x 4 hours  - Bed rest x 4 hours   - Resume Warfarin   - Continue high intensity statin therapy (LDL goal < 70)  - Risk factor reduction (BP <130/80 mmHg, glycemic control, etc)  - Cardiac rehab referral  - Follow up with outpatient cardiologist    Estimated Blood loss: 20 cc    Specimens removed: No    Cabrera Erickson MD

## 2023-03-02 NOTE — PROGRESS NOTES
Bedrest and ivf completed. Dressing to left groin remains clean, dry and intact. Soft to palpation. Assisted up to ambulate. Able to ambulate and void without complication. Dressing remains c/d/I. Getting dressed for discharge.

## 2023-03-02 NOTE — Clinical Note
The catheter was repositioned into the ostial LIMA graft. An angiography was performed of the graft. Multiple views were taken. LIMA to LAD

## 2023-03-02 NOTE — PROGRESS NOTES
You have been scheduled for a procedure in the echo lab on Tuesday, April 11, 2023.     Please report to the Cardiology Waiting Area on the Third floor of the hospital and check in at 8 AM.     You will then be taken to the SSCU (Short Stay Cardiac Unit) and prepared for your procedure. Please be aware that this is not the time of your procedure but the time you are to arrive. The procedures are scheduled on an hourly basis; however, emergency cases take precedence over all other cases.     You may not have anything to eat or drink after midnight the night before your test. You may take your regular morning medications with water.    The procedure will take 1-2 hours to perform. After the procedure, you will return to SSCU on the third floor of the hospital. Your family may remain in the room with you during this time.     You will be discharged home that same afternoon. You must have someone to drive you home.  Your doctor will determine, based on your progress, the time of your discharge. The results of your procedure will be discussed with you before you are discharged. Any further testing or procedures will be scheduled for you either before you leave or you will be called with these appointments.       If you should have any questions, concerns, or need to change the date of your procedure, please call  Mag GARCIA @ [phone]      Special Instructions:    Continue your current medications.    Keep appointment with the Coumadin clinic with weekly labs to monitor INR levels.        THE ABOVE INSTRUCTIONS WERE GIVEN TO THE PATIENT VERBALLY AND THEY VERBALIZED UNDERSTANDING.  THEY DO NOT REQUIRE ANY SPECIAL NEEDS AND DO NOT HAVE ANY LEARNING BARRIERS.          Directions for Reporting to Cardiology Waiting Area in the Hospital  If you park in the Parking Garage:  Take elevators to the1st floor of the parking garage.  Continue past the gift shop, coffee shop, and piano.  Take a right and go to the gold  elevators. (Elevator B)  Take the elevator to the 3rd floor.  Follow the arrow on the sign on the wall that says Cath Lab Registration/EP Lab Registration.  Follow the long hallway all the way around until you come to a big open area.  This is the registration area.  Check in at Reception Desk.    OR    If family is dropping you off:  Have them drop you off at the front of the Hospital under the green overhang.  Enter through the doors and take a right.  Take the E elevators to the 3rd floor Cardiology Waiting Area.  Check in at the Reception Desk in the waiting room.

## 2023-03-02 NOTE — PLAN OF CARE
Reviewed discharge material with patient and spouse. Verbalized understanding and given copy of discharge paperwork. Waiting on transport.

## 2023-03-02 NOTE — Clinical Note
The left groin was prepped. The site was prepped with ChloraPrep. The site was clipped. The patient was draped. The patient was positioned supine.

## 2023-03-02 NOTE — PROGRESS NOTES
Able to void clear yellow urine to urinal. Dressing to left groin site remains clean, dry, and intact. Soft to palpation. Family at bedside.

## 2023-03-04 ENCOUNTER — HOSPITAL ENCOUNTER (EMERGENCY)
Facility: HOSPITAL | Age: 83
Discharge: HOME | End: 2023-03-05
Attending: STUDENT IN AN ORGANIZED HEALTH CARE EDUCATION/TRAINING PROGRAM
Payer: MEDICARE

## 2023-03-04 VITALS
DIASTOLIC BLOOD PRESSURE: 62 MMHG | TEMPERATURE: 98 F | RESPIRATION RATE: 20 BRPM | HEART RATE: 81 BPM | OXYGEN SATURATION: 99 % | SYSTOLIC BLOOD PRESSURE: 131 MMHG

## 2023-03-04 DIAGNOSIS — R04.0 ACUTE ANTERIOR EPISTAXIS: Primary | ICD-10-CM

## 2023-03-04 LAB
BASOPHILS # BLD AUTO: 0.04 K/UL (ref 0–0.2)
BASOPHILS NFR BLD: 0.7 % (ref 0–1.9)
DIFFERENTIAL METHOD: ABNORMAL
EOSINOPHIL # BLD AUTO: 0.1 K/UL (ref 0–0.5)
EOSINOPHIL NFR BLD: 2.5 % (ref 0–8)
ERYTHROCYTE [DISTWIDTH] IN BLOOD BY AUTOMATED COUNT: 13.8 % (ref 11.5–14.5)
HCT VFR BLD AUTO: 30 % (ref 40–54)
HGB BLD-MCNC: 9.4 G/DL (ref 14–18)
IMM GRANULOCYTES # BLD AUTO: 0.01 K/UL (ref 0–0.04)
IMM GRANULOCYTES NFR BLD AUTO: 0.2 % (ref 0–0.5)
INR PPP: 1.3 (ref 0.8–1.2)
LYMPHOCYTES # BLD AUTO: 1.5 K/UL (ref 1–4.8)
LYMPHOCYTES NFR BLD: 25.7 % (ref 18–48)
MCH RBC QN AUTO: 30.5 PG (ref 27–31)
MCHC RBC AUTO-ENTMCNC: 31.3 G/DL (ref 32–36)
MCV RBC AUTO: 97 FL (ref 82–98)
MONOCYTES # BLD AUTO: 0.6 K/UL (ref 0.3–1)
MONOCYTES NFR BLD: 10.1 % (ref 4–15)
NEUTROPHILS # BLD AUTO: 3.4 K/UL (ref 1.8–7.7)
NEUTROPHILS NFR BLD: 60.8 % (ref 38–73)
NRBC BLD-RTO: 0 /100 WBC
PLATELET # BLD AUTO: 149 K/UL (ref 150–450)
PMV BLD AUTO: 10 FL (ref 9.2–12.9)
PROTHROMBIN TIME: 13 SEC (ref 9–12.5)
RBC # BLD AUTO: 3.08 M/UL (ref 4.6–6.2)
WBC # BLD AUTO: 5.65 K/UL (ref 3.9–12.7)

## 2023-03-04 PROCEDURE — 85025 COMPLETE CBC W/AUTO DIFF WBC: CPT | Mod: HCNC | Performed by: PHYSICIAN ASSISTANT

## 2023-03-04 PROCEDURE — 99284 PR EMERGENCY DEPT VISIT,LEVEL IV: ICD-10-PCS | Mod: 25,HCNC,, | Performed by: PHYSICIAN ASSISTANT

## 2023-03-04 PROCEDURE — 99283 EMERGENCY DEPT VISIT LOW MDM: CPT | Mod: HCNC,25

## 2023-03-04 PROCEDURE — 30901 CONTROL OF NOSEBLEED: CPT | Mod: LT,,, | Performed by: PHYSICIAN ASSISTANT

## 2023-03-04 PROCEDURE — 85610 PROTHROMBIN TIME: CPT | Mod: HCNC | Performed by: PHYSICIAN ASSISTANT

## 2023-03-04 PROCEDURE — 30901 CONTROL OF NOSEBLEED: CPT | Mod: LT

## 2023-03-04 PROCEDURE — 99284 EMERGENCY DEPT VISIT MOD MDM: CPT | Mod: 25,HCNC,, | Performed by: PHYSICIAN ASSISTANT

## 2023-03-04 PROCEDURE — 30901 PR CTRL 2SEBLEED,ANTER,SIMPLE: ICD-10-PCS | Mod: LT,,, | Performed by: PHYSICIAN ASSISTANT

## 2023-03-04 PROCEDURE — 25000003 PHARM REV CODE 250: Mod: HCNC | Performed by: PHYSICIAN ASSISTANT

## 2023-03-04 RX ORDER — OXYMETAZOLINE HCL 0.05 %
1 SPRAY, NON-AEROSOL (ML) NASAL
Status: COMPLETED | OUTPATIENT
Start: 2023-03-04 | End: 2023-03-04

## 2023-03-04 RX ORDER — CEPHALEXIN 500 MG/1
500 CAPSULE ORAL
Status: COMPLETED | OUTPATIENT
Start: 2023-03-05 | End: 2023-03-05

## 2023-03-04 RX ORDER — CEPHALEXIN 500 MG/1
500 CAPSULE ORAL EVERY 12 HOURS
Qty: 14 CAPSULE | Refills: 0 | Status: SHIPPED | OUTPATIENT
Start: 2023-03-04 | End: 2023-03-11

## 2023-03-04 RX ADMIN — OXYMETAZOLINE HCL 1 SPRAY: 0.05 SPRAY NASAL at 08:03

## 2023-03-05 PROCEDURE — 25000003 PHARM REV CODE 250: Mod: HCNC | Performed by: PHYSICIAN ASSISTANT

## 2023-03-05 RX ADMIN — CEPHALEXIN 500 MG: 500 CAPSULE ORAL at 12:03

## 2023-03-05 NOTE — ED PROVIDER NOTES
Encounter Date: 3/4/2023       History     Chief Complaint   Patient presents with    Epistaxis     Pt arrives c/o epistaxis that began around 1800. On blood thinners.     82-year-old male with history of CKD stage 3, CHF, bilateral carotid artery disease, DM T2, paroxysmal atrial fibrillation (on Coumadin) who presents emergency department for epistaxis.  Reports intermittent episodes for the past 3 days that he was able to resolve with direct pressure and cotton balls.  States 3 days ago had the left nare cauterized by ENT.  States at 6:00 p.m. was unable to control his epistaxis which prompted him to come to the ED. denies any digital trauma.    The history is provided by the patient.   Review of patient's allergies indicates:   Allergen Reactions    Fosinopril      Intolerance- elevates potassium level      Losartan      Intolerance- elevates potassium level     Past Medical History:   Diagnosis Date    Anemia of chronic renal failure, stage 3 (moderate) 05/27/2015    Anticoagulant long-term use     Atherosclerosis of coronary artery bypass graft of native heart without angina pectoris 09/11/2012    3-27-18 [facility] Two vessel coronary artery disease.   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    Bilateral carotid artery disease 02/09/2017    Bleeding from the nose     Bleeding nose 03/21/2018    Cataract     CHF (congestive heart failure)     CKD (chronic kidney disease) stage 3, GFR 30-59 ml/min 05/27/2015    Claudication of left lower extremity 09/17/2014    Colon polyp     Encounter for blood transfusion     Gastroesophageal reflux disease without esophagitis 03/19/2018    Gastrointestinal hemorrhage associated with intestinal diverticulosis 04/01/2018    Glaucoma     H/O mechanical aortic valve replacement 09/17/2014    History of gout 09/26/2012    Hyperparathyroidism due to renal insufficiency 07/27/2015    Internal hemorrhoid 04/03/2018    Long term current use of anticoagulant therapy  09/26/2012    Mechanical heart valve present     Metabolic acidosis with normal anion gap and bicarbonate losses 03/20/2018    Mixed hyperlipidemia 09/26/2012    NSTEMI (non-ST elevated myocardial infarction) 03/21/2018    Obesity, diabetes, and hypertension syndrome 02/23/2016    Paroxysmal atrial fibrillation 02/06/2023    PVD (peripheral vascular disease) 09/11/2012    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     Past Surgical History:   Procedure Laterality Date    BACK SURGERY      CARDIAC CATHETERIZATION      CARDIAC VALVE REPLACEMENT      CARDIAC VALVE SURGERY      CARPAL TUNNEL RELEASE Right 05/19/2020    Procedure: RELEASE, CARPAL TUNNEL;  Surgeon: Rupesh Norris Jr., MD;  Location: [facility];  Service: Plastics;  Laterality: Right;    CATARACT EXTRACTION Left 11/13/2022        COLON SURGERY      COLONOSCOPY N/A 03/31/2017    Procedure: COLONOSCOPY;  Surgeon: Bruno Raymond MD;  Location: [facility] (4TH FLR);  Service: Endoscopy;  Laterality: N/A;  Patient's wife requesting date.    COLONOSCOPY N/A 04/03/2018    Procedure: COLONOSCOPY;  Surgeon: Bonifacio Pelletier MD;  Location: [facility] (2ND FLR);  Service: Endoscopy;  Laterality: N/A;    COLONOSCOPY N/A 08/13/2018    Procedure: COLONOSCOPY;  Surgeon: Kam Barba MD;  Location: [facility] (2ND FLR);  Service: Endoscopy;  Laterality: N/A;  2nd floor: PA pressure 49; hx of moderate-severe valve disease     per Coumadin clinic-Patient can hold 5 days with lovenox bridge       ok to schedule per Katarina    CORONARY ANGIOGRAPHY N/A 10/04/2021    Procedure: Left heart cath +/- peripheral angiogram;  Surgeon: Jose Ruiz MD;  Location: [facility] CATH LAB;   Service: Cardiology;  Laterality: N/A;    CORONARY ANGIOPLASTY      CORONARY ARTERY BYPASS GRAFT      CORONARY BYPASS GRAFT ANGIOGRAPHY  10/04/2021    Procedure: Bypass graft study;  Surgeon: Jose Ruiz MD;  Location: [facility] CATH LAB;  Service: Cardiology;;    EYE SURGERY      HERNIA REPAIR      INTRAOCULAR PROSTHESES INSERTION Left 11/13/2022    Procedure: INSERTION, IOL PROSTHESIS;  Surgeon: Alia Mckeon MD;  Location: [facility] OR [address] [place] [address];  Service: Ophthalmology;  Laterality: Left;    INTRAOCULAR PROSTHESES INSERTION Right 12/04/2022    Procedure: INSERTION, IOL PROSTHESIS;  Surgeon: Alia Mckeon MD;  Location: [facility] OR [address] [place] [address];  Service: Ophthalmology;  Laterality: Right;    PHACOEMULSIFICATION OF CATARACT Left 11/13/2022    Procedure: PHACOEMULSIFICATION, CATARACT;  Surgeon: Alia Mckeon MD;  Location: [facility] OR [address] [place] [address];  Service: Ophthalmology;  Laterality: Left;    PHACOEMULSIFICATION OF CATARACT Right 12/04/2022    Procedure: PHACOEMULSIFICATION, CATARACT;  Surgeon: Alia Mckeon MD;  Location: [facility] OR [address] [place] [address];  Service: Ophthalmology;  Laterality: Right;    SPINE SURGERY      VASECTOMY       Family History   Problem Relation Age of Onset    Heart failure Mother     Heart disease Mother     Heart failure Father     Heart disease Father     Alcohol abuse Father     Heart failure Brother     Heart disease Brother     Diabetes Brother     No Known Problems Sister     No Known Problems Maternal Grandmother     No Known Problems Maternal Grandfather     No Known Problems Paternal Grandmother     No Known Problems Paternal Grandfather     Heart disease Sister     No Known Problems Maternal Aunt     No Known Problems Maternal Uncle     No Known Problems Paternal Aunt     No Known Problems Paternal Uncle     Amblyopia Neg Hx     Blindness Neg Hx     Cancer Neg Hx     Cataracts Neg Hx     Glaucoma Neg Hx     Hypertension Neg Hx     Macular degeneration Neg Hx     Retinal detachment Neg Hx     Strabismus  Neg Hx     Stroke Neg Hx     Thyroid disease Neg Hx     Anemia Neg Hx     Arrhythmia Neg Hx     Asthma Neg Hx     Clotting disorder Neg Hx     Fainting Neg Hx     Heart attack Neg Hx     Hyperlipidemia Neg Hx     Atrial Septal Defect Neg Hx     Melanoma Neg Hx      Social History     Tobacco Use    Smoking status: Former     Packs/day: 1.00     Years: 20.00     Pack years: 20.00     Types: Cigarettes     Quit date: 1980     Years since quittin.5     Passive exposure: Never    Smokeless tobacco: Never   Substance Use Topics    Alcohol use: No    Drug use: No     Review of Systems   Constitutional:  Negative for chills and fever.   HENT:  Positive for nosebleeds.    Eyes:  Negative for pain.   Respiratory:  Negative for cough and shortness of breath.    Cardiovascular:  Negative for chest pain.   Gastrointestinal:  Negative for abdominal pain.   Genitourinary:  Negative for difficulty urinating and dysuria.   Musculoskeletal: Negative.    Skin: Negative.    Neurological:  Negative for weakness.   Psychiatric/Behavioral:  Negative for confusion.      Physical Exam     Initial Vitals   BP Pulse Resp Temp SpO2   23 19323 193   (!) 174/74 80 18 97.9 °F (36.6 °C) 98 %      MAP       --                Physical Exam    Nursing note and vitals reviewed.  Constitutional: He appears well-developed and well-nourished.   HENT:   Head: Normocephalic and atraumatic.   Actively bleeding from the left Ch.  Bleeding from the anterior Kiesselbach plexus.  Some residual blood in the posterior oropharynx but no active blood posteriorly.   Eyes: Conjunctivae are normal. Pupils are equal, round, and reactive to light.   Neck: Neck supple.   Normal range of motion.  Cardiovascular:  Normal rate, regular rhythm, normal heart sounds and intact distal pulses.           Pulmonary/Chest: Breath sounds normal.   Abdominal: Abdomen is soft. Bowel sounds are normal. There is no  abdominal tenderness.   Musculoskeletal:         General: Normal range of motion.      Cervical back: Normal range of motion and neck supple.     Neurological: He is alert and oriented to person, place, and time. GCS score is 15. GCS eye subscore is 4. GCS verbal subscore is 5. GCS motor subscore is 6.   Skin: Skin is warm and dry. Capillary refill takes less than 2 seconds.   Psychiatric: He has a normal mood and affect.       ED Course   Procedures  Labs Reviewed   CBC W/ AUTO DIFFERENTIAL - Abnormal; Notable for the following components:       Result Value    RBC 3.08 (*)     Hemoglobin 9.4 (*)     Hematocrit 30.0 (*)     MCHC 31.3 (*)     Platelets 149 (*)     All other components within normal limits   PROTIME-INR - Abnormal; Notable for the following components:    Prothrombin Time 13.0 (*)     INR 1.3 (*)     All other components within normal limits          Imaging Results    None          Medications   cephALEXin capsule 500 mg (has no administration in time range)   oxymetazoline 0.05 % nasal spray 1 spray (1 spray Each Nostril Given by Provider 3/4/23 2054)     Medical Decision Making:   History:   Old Medical Records: I decided to obtain old medical records.  Initial Assessment:   82-year-old male presents emergency department for epistaxis.  Differential Diagnosis:   Epistaxis, anemia, supratherapeutic INR, posterior nosebleed  Clinical Tests:   Lab Tests: Ordered and Reviewed  ED Management:  On exam has a anterior nosebleed in the left Ch.  Direct pressure and Afrin with no improvement of his epistaxis.  Rhino rocket was then placed and he was monitored for 15 minutes with resolution of his bleeding with no blood in the posterior oropharynx.  Hemoglobin stable.  Does appear to be slightly subtherapeutic INR recommends he follow-up with his PCP for a repeat INR check.  Will discharge home with Keflex and close ENT follow-up.  Discussed return precautions for any new or worsening symptoms.                         Clinical Impression:   Final diagnoses:  [R04.0] Acute anterior epistaxis (Primary)        ED Disposition Condition    Discharge Stable          ED Prescriptions       Medication Sig Dispense Start Date End Date Auth. Provider    cephALEXin (KEFLEX) 500 MG capsule Take 1 capsule (500 mg total) by mouth every 12 (twelve) hours. for 7 days 14 capsule 3/4/2023 3/11/2023 Marques Espinoza PA-C          Follow-up Information       Follow up With Specialties Details Why Contact Info Additional Information    [facility] Call   [id number]  [facility] [address] [phone] [facility] - Main Building, 4th Floor Please park in [facility] and use Clinic elevator             Marques Espinoza PA-C  03/04/23 [id number]

## 2023-03-05 NOTE — ED TRIAGE NOTES
Rec'd with c/o nose bleed since 1800 today. Pt was getting up out of his chair when nose started to bleed. + for BT. States was cauterized Wednesday of this week for same problems.     Past Medical History:   Diagnosis Date    Anemia of chronic renal failure, stage 3 (moderate) 05/27/2015    Anticoagulant long-term use     Atherosclerosis of coronary artery bypass graft of native heart without angina pectoris 09/11/2012    3-27-18 [facility] Two vessel coronary artery disease.   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    Bilateral carotid artery disease 02/09/2017    Bleeding from the nose     Bleeding nose 03/21/2018    Cataract     CHF (congestive heart failure)     CKD (chronic kidney disease) stage 3, GFR 30-59 ml/min 05/27/2015    Claudication of left lower extremity 09/17/2014    Colon polyp     Encounter for blood transfusion     Gastroesophageal reflux disease without esophagitis 03/19/2018    Gastrointestinal hemorrhage associated with intestinal diverticulosis 04/01/2018    Glaucoma     H/O mechanical aortic valve replacement 09/17/2014    History of gout 09/26/2012    Hyperparathyroidism due to renal insufficiency 07/27/2015    Internal hemorrhoid 04/03/2018    Long term current use of anticoagulant therapy 09/26/2012    Mechanical heart valve present     Metabolic acidosis with normal anion gap and bicarbonate losses 03/20/2018    Mixed hyperlipidemia 09/26/2012    NSTEMI (non-ST elevated myocardial infarction) 03/21/2018    Obesity, diabetes, and hypertension syndrome 02/23/2016    Paroxysmal atrial fibrillation 02/06/2023    PVD (peripheral vascular disease) 09/11/2012    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 02/01/2023    vertex scalp    Syncope 09/29/2022    Type 2 diabetes mellitus with diabetic peripheral angiopathy without gangrene 05/27/2015    Type 2 diabetes mellitus with stage 3 chronic kidney disease, without long-term current use  of insulin 10/02/2013       Past Surgical History:   Procedure Laterality Date    BACK SURGERY      CARDIAC CATHETERIZATION      CARDIAC VALVE REPLACEMENT      CARDIAC VALVE SURGERY      CARPAL TUNNEL RELEASE Right 05/19/2020    Procedure: RELEASE, CARPAL TUNNEL;  Surgeon: Rupesh Norris Jr., MD;  Location: [facility];  Service: Plastics;  Laterality: Right;    CATARACT EXTRACTION Left 11/13/2022        COLON SURGERY      COLONOSCOPY N/A 03/31/2017    Procedure: COLONOSCOPY;  Surgeon: Bruno Raymond MD;  Location: [facility] (4TH FLR);  Service: Endoscopy;  Laterality: N/A;  Patient's wife requesting date.    COLONOSCOPY N/A 04/03/2018    Procedure: COLONOSCOPY;  Surgeon: Bonifacio Pelletier MD;  Location: [facility] ENDO (2ND FLR);  Service: Endoscopy;  Laterality: N/A;    COLONOSCOPY N/A 08/13/2018    Procedure: COLONOSCOPY;  Surgeon: Kam Barba MD;  Location: [facility] (2ND FLR);  Service: Endoscopy;  Laterality: N/A;  2nd floor: PA pressure 49; hx of moderate-severe valve disease     per Coumadin clinic-Patient can hold 5 days with lovenox bridge       ok to schedule per Katarina    CORONARY ANGIOGRAPHY N/A 10/04/2021    Procedure: Left heart cath +/- peripheral angiogram;  Surgeon: Jose Ruiz MD;  Location: [facility] CATH LAB;  Service: Cardiology;  Laterality: N/A;    CORONARY ANGIOPLASTY      CORONARY ARTERY BYPASS GRAFT      CORONARY BYPASS GRAFT ANGIOGRAPHY  10/04/2021    Procedure: Bypass graft study;  Surgeon: Jose Ruiz MD;  Location: [facility] CATH LAB;  Service: Cardiology;;    EYE SURGERY      HERNIA REPAIR      INTRAOCULAR PROSTHESES INSERTION Left 11/13/2022    Procedure: INSERTION, IOL PROSTHESIS;  Surgeon: Alia Mckeon MD;  Location: [address];  Service: Ophthalmology;  Laterality: Left;    INTRAOCULAR PROSTHESES INSERTION Right 12/04/2022    Procedure: INSERTION, IOL PROSTHESIS;  Surgeon: Alia Mckeon MD;  Location: [address];  Service: Ophthalmology;  Laterality:  Right;    PHACOEMULSIFICATION OF CATARACT Left 2022    Procedure: PHACOEMULSIFICATION, CATARACT;  Surgeon: Alia Mckeon MD;  Location: [facility] OR [address] [place] [address];  Service: Ophthalmology;  Laterality: Left;    PHACOEMULSIFICATION OF CATARACT Right 2022    Procedure: PHACOEMULSIFICATION, CATARACT;  Surgeon: Alia Mckeon MD;  Location: [facility] OR [address] [place] [address];  Service: Ophthalmology;  Laterality: Right;    SPINE SURGERY      VASECTOMY         Family History   Problem Relation Age of Onset    Heart failure Mother     Heart disease Mother     Heart failure Father     Heart disease Father     Alcohol abuse Father     Heart failure Brother     Heart disease Brother     Diabetes Brother     No Known Problems Sister     No Known Problems Maternal Grandmother     No Known Problems Maternal Grandfather     No Known Problems Paternal Grandmother     No Known Problems Paternal Grandfather     Heart disease Sister     No Known Problems Maternal Aunt     No Known Problems Maternal Uncle     No Known Problems Paternal Aunt     No Known Problems Paternal Uncle     Amblyopia Neg Hx     Blindness Neg Hx     Cancer Neg Hx     Cataracts Neg Hx     Glaucoma Neg Hx     Hypertension Neg Hx     Macular degeneration Neg Hx     Retinal detachment Neg Hx     Strabismus Neg Hx     Stroke Neg Hx     Thyroid disease Neg Hx     Anemia Neg Hx     Arrhythmia Neg Hx     Asthma Neg Hx     Clotting disorder Neg Hx     Fainting Neg Hx     Heart attack Neg Hx     Hyperlipidemia Neg Hx     Atrial Septal Defect Neg Hx     Melanoma Neg Hx        Social History     Socioeconomic History    Marital status:      Spouse name: Ameena    Number of children: 3   Occupational History    Occupation: retired   Tobacco Use    Smoking status: Former     Packs/day: 1.00     Years: 20.00     Pack years: 20.00     Types: Cigarettes     Quit date: 1980     Years since quittin.5     Passive exposure: Never    Smokeless tobacco: Never   Substance and  Sexual Activity    Alcohol use: No    Drug use: No    Sexual activity: Not Currently     Social Determinants of Health     Financial Resource Strain: Low Risk     Difficulty of Paying Living Expenses: Not hard at all   Food Insecurity: No Food Insecurity    Worried About Running Out of Food in the Last Year: Never true    Ran Out of Food in the Last Year: Never true   Transportation Needs: No Transportation Needs    Lack of Transportation (Medical): No    Lack of Transportation (Non-Medical): No   Physical Activity: Inactive    Days of Exercise per Week: 0 days    Minutes of Exercise per Session: 0 min   Stress: No Stress Concern Present    Feeling of Stress : Not at all   Social Connections: Moderately Integrated    Frequency of Communication with Friends and Family: Once a week    Frequency of Social Gatherings with Friends and Family: More than three times a week    Attends Rastafari Services: Never    Active Member of Clubs or Organizations: Yes    Attends Club or Organization Meetings: More than 4 times per year    Marital Status:    Housing Stability: Low Risk     Unable to Pay for Housing in the Last Year: No    Number of Places Lived in the Last Year: 1    Unstable Housing in the Last Year: No       No current facility-administered medications for this encounter.     Current Outpatient Medications   Medication Sig Dispense Refill    albuterol (VENTOLIN HFA) 90 mcg/actuation inhaler Inhale 2 puffs into the lungs every 6 (six) hours as needed for Wheezing. Rescue 18 g 1    allopurinoL (ZYLOPRIM) 100 MG tablet TAKE 1 TABLET EVERY DAY 90 tablet 3    bumetanide (BUMEX) 1 MG tablet Take 1 tablet (1 mg total) by mouth once daily. 30 tablet 11    ferrous gluconate (FERGON) 324 MG tablet Take 1 tablet (324 mg total) by mouth daily with breakfast. 90 tablet 1    glimepiride (AMARYL) 1 MG tablet       loperamide (IMODIUM) 2 mg capsule Take 2 mg by mouth 4 (four) times daily as needed for Diarrhea.       metoprolol succinate (TOPROL-XL) 25 MG 24 hr tablet Take 1 tablet (25 mg total) by mouth once daily. 90 tablet 3    midazolam (VERSED) 1 mg/mL injection       omega-3 fatty acids/fish oil (FISH OIL-OMEGA-3 FATTY ACIDS) 300-1,000 mg capsule Take 1 capsule by mouth once daily.      oxymetazoline (AFRIN) 0.05 % nasal spray Use 2 sprays by Nasal route daily as needed (nose bleed). 30 mL 0    rosuvastatin (CRESTOR) 40 MG Tab TAKE 1 TABLET EVERY EVENING. 90 tablet 3    tramadol-acetaminophen 37.5-325 mg (ULTRACET) 37.5-325 mg Tab 1 tablet by Per J Tube route every 12 (twelve) hours as needed for Pain (pain). 20 tablet 0    warfarin (COUMADIN) 5 MG tablet warfarin 7.5 (1.5 tablets) sun and Thursday, and 5mg other days 30 tablet 11     Facility-Administered Medications Ordered in Other Encounters   Medication Dose Route Frequency Provider Last Rate Last Admin    0.9%  NaCl infusion   Intravenous Continuous Devon Garnica  mL/hr at 03/02/23 0840 New Bag at 03/02/23 0840    ondansetron injection 4 mg  4 mg Intravenous Daily PRN Tara Hernandez MD        ondansetron injection 4 mg  4 mg Intravenous Daily PRN Tara Hernandez MD        phenylephrine HCL 2.5% ophthalmic solution 1 drop  1 drop Left Eye On Call Procedure Alia Mckeon MD   1 drop at 11/13/22 0630    phenylephrine HCL 2.5% ophthalmic solution 1 drop  1 drop Right Eye On Call Procedure Alia Mckeon MD   1 drop at 12/04/22 0630    proparacaine 0.5 % ophthalmic solution 1 drop  1 drop Left Eye On Call Procedure Alia Mckeon MD   1 drop at 11/13/22 0620    proparacaine 0.5 % ophthalmic solution 1 drop  1 drop Right Eye On Call Procedure Alia Mckeon MD   1 drop at 12/04/22 0620    tropicamide 1% ophthalmic solution 1 drop  1 drop Left Eye On Call Procedure Alia Mckeon MD   1 drop at 11/13/22 0630    tropicamide 1% ophthalmic solution 1 drop  1 drop Right Eye On Call Procedure Alia Mckeon MD   1 drop at 12/04/22 0635        Review of patient's allergies indicates:   Allergen Reactions    Fosinopril      Intolerance- elevates potassium level      Losartan      Intolerance- elevates potassium level

## 2023-03-09 ENCOUNTER — OFFICE VISIT (OUTPATIENT)
Dept: OTOLARYNGOLOGY | Facility: CLINIC | Age: 83
End: 2023-03-09
Payer: MEDICARE

## 2023-03-09 ENCOUNTER — LAB VISIT (OUTPATIENT)
Dept: LAB | Facility: HOSPITAL | Age: 83
End: 2023-03-09
Payer: MEDICARE

## 2023-03-09 ENCOUNTER — ANTI-COAG VISIT (OUTPATIENT)
Dept: CARDIOLOGY | Facility: CLINIC | Age: 83
End: 2023-03-09
Payer: MEDICARE

## 2023-03-09 VITALS
DIASTOLIC BLOOD PRESSURE: 73 MMHG | SYSTOLIC BLOOD PRESSURE: 133 MMHG | HEART RATE: 73 BPM | WEIGHT: 167.56 LBS | HEIGHT: 65 IN | BODY MASS INDEX: 27.92 KG/M2

## 2023-03-09 DIAGNOSIS — Z79.01 ANTICOAGULANT LONG-TERM USE: Primary | ICD-10-CM

## 2023-03-09 DIAGNOSIS — Z95.2 H/O MECHANICAL AORTIC VALVE REPLACEMENT: ICD-10-CM

## 2023-03-09 DIAGNOSIS — Z79.01 LONG TERM CURRENT USE OF ANTICOAGULANT THERAPY: ICD-10-CM

## 2023-03-09 DIAGNOSIS — R04.0 ACUTE ANTERIOR EPISTAXIS: ICD-10-CM

## 2023-03-09 DIAGNOSIS — N18.32 STAGE 3B CHRONIC KIDNEY DISEASE: Chronic | ICD-10-CM

## 2023-03-09 LAB
INR PPP: 1.1 (ref 0.8–1.2)
PROTHROMBIN TIME: 11.4 SEC (ref 9–12.5)

## 2023-03-09 PROCEDURE — 30906 REPEAT CONTROL OF NOSEBLEED: CPT | Mod: HCNC,S$GLB,, | Performed by: STUDENT IN AN ORGANIZED HEALTH CARE EDUCATION/TRAINING PROGRAM

## 2023-03-09 PROCEDURE — 1125F AMNT PAIN NOTED PAIN PRSNT: CPT | Mod: HCNC,CPTII,S$GLB, | Performed by: STUDENT IN AN ORGANIZED HEALTH CARE EDUCATION/TRAINING PROGRAM

## 2023-03-09 PROCEDURE — 85610 PROTHROMBIN TIME: CPT | Mod: HCNC | Performed by: INTERNAL MEDICINE

## 2023-03-09 PROCEDURE — 99214 OFFICE O/P EST MOD 30 MIN: CPT | Mod: 25,HCNC,S$GLB, | Performed by: STUDENT IN AN ORGANIZED HEALTH CARE EDUCATION/TRAINING PROGRAM

## 2023-03-09 PROCEDURE — 93793 PR ANTICOAGULANT MGMT FOR PT TAKING WARFARIN: ICD-10-PCS | Mod: S$GLB,,,

## 2023-03-09 PROCEDURE — 3075F PR MOST RECENT SYSTOLIC BLOOD PRESS GE 130-139MM HG: ICD-10-PCS | Mod: HCNC,CPTII,S$GLB, | Performed by: STUDENT IN AN ORGANIZED HEALTH CARE EDUCATION/TRAINING PROGRAM

## 2023-03-09 PROCEDURE — 3075F SYST BP GE 130 - 139MM HG: CPT | Mod: HCNC,CPTII,S$GLB, | Performed by: STUDENT IN AN ORGANIZED HEALTH CARE EDUCATION/TRAINING PROGRAM

## 2023-03-09 PROCEDURE — 3072F LOW RISK FOR RETINOPATHY: CPT | Mod: HCNC,CPTII,S$GLB, | Performed by: STUDENT IN AN ORGANIZED HEALTH CARE EDUCATION/TRAINING PROGRAM

## 2023-03-09 PROCEDURE — 99214 PR OFFICE/OUTPT VISIT, EST, LEVL IV, 30-39 MIN: ICD-10-PCS | Mod: 25,HCNC,S$GLB, | Performed by: STUDENT IN AN ORGANIZED HEALTH CARE EDUCATION/TRAINING PROGRAM

## 2023-03-09 PROCEDURE — 3078F DIAST BP <80 MM HG: CPT | Mod: HCNC,CPTII,S$GLB, | Performed by: STUDENT IN AN ORGANIZED HEALTH CARE EDUCATION/TRAINING PROGRAM

## 2023-03-09 PROCEDURE — 1159F PR MEDICATION LIST DOCUMENTED IN MEDICAL RECORD: ICD-10-PCS | Mod: HCNC,CPTII,S$GLB, | Performed by: STUDENT IN AN ORGANIZED HEALTH CARE EDUCATION/TRAINING PROGRAM

## 2023-03-09 PROCEDURE — 30906 PR REPEAT CONTROL OF 2SEBLEED: ICD-10-PCS | Mod: HCNC,S$GLB,, | Performed by: STUDENT IN AN ORGANIZED HEALTH CARE EDUCATION/TRAINING PROGRAM

## 2023-03-09 PROCEDURE — 1159F MED LIST DOCD IN RCRD: CPT | Mod: HCNC,CPTII,S$GLB, | Performed by: STUDENT IN AN ORGANIZED HEALTH CARE EDUCATION/TRAINING PROGRAM

## 2023-03-09 PROCEDURE — 3072F PR LOW RISK FOR RETINOPATHY: ICD-10-PCS | Mod: HCNC,CPTII,S$GLB, | Performed by: STUDENT IN AN ORGANIZED HEALTH CARE EDUCATION/TRAINING PROGRAM

## 2023-03-09 PROCEDURE — 36415 COLL VENOUS BLD VENIPUNCTURE: CPT | Mod: HCNC | Performed by: INTERNAL MEDICINE

## 2023-03-09 PROCEDURE — 1125F PR PAIN SEVERITY QUANTIFIED, PAIN PRESENT: ICD-10-PCS | Mod: HCNC,CPTII,S$GLB, | Performed by: STUDENT IN AN ORGANIZED HEALTH CARE EDUCATION/TRAINING PROGRAM

## 2023-03-09 PROCEDURE — 99999 PR PBB SHADOW E&M-EST. PATIENT-LVL IV: ICD-10-PCS | Mod: PBBFAC,HCNC,, | Performed by: STUDENT IN AN ORGANIZED HEALTH CARE EDUCATION/TRAINING PROGRAM

## 2023-03-09 PROCEDURE — 93793 ANTICOAG MGMT PT WARFARIN: CPT | Mod: S$GLB,,,

## 2023-03-09 PROCEDURE — 99999 PR PBB SHADOW E&M-EST. PATIENT-LVL IV: CPT | Mod: PBBFAC,HCNC,, | Performed by: STUDENT IN AN ORGANIZED HEALTH CARE EDUCATION/TRAINING PROGRAM

## 2023-03-09 PROCEDURE — 3078F PR MOST RECENT DIASTOLIC BLOOD PRESSURE < 80 MM HG: ICD-10-PCS | Mod: HCNC,CPTII,S$GLB, | Performed by: STUDENT IN AN ORGANIZED HEALTH CARE EDUCATION/TRAINING PROGRAM

## 2023-03-09 NOTE — PROGRESS NOTES
Subjective:     Dariel Urbina is a 82 y.o. male who was referred to me by Marques Espinoza in consultation for nosebleeds.    He has previously had nasal cauterization, he was previously seen by Dr. Patel on 3/1/23.  The bleeding has required packing for control, was seen in the ED on 3/4/23 where pack was placed.  The last episode was 4 days ago, and is not currently active.  There is a prior history of nasal surgery.  There is not a prior history of nasal trauma.  There is not a history of environmental allergies.  He does not currently use a nasal spray.  There is not a family history to suggest a clotting disorder.  He does currently take a blood-thinning agent, warfrin.    Past Medical History  He has a past medical history of Anemia of chronic renal failure, stage 3 (moderate), Anticoagulant long-term use, Atherosclerosis of coronary artery bypass graft of native heart without angina pectoris, Bilateral carotid artery disease, Bleeding from the nose, Bleeding nose, Cataract, CHF (congestive heart failure), CKD (chronic kidney disease) stage 3, GFR 30-59 ml/min, Claudication of left lower extremity, Colon polyp, Encounter for blood transfusion, Gastroesophageal reflux disease without esophagitis, Gastrointestinal hemorrhage associated with intestinal diverticulosis, Glaucoma, H/O mechanical aortic valve replacement, History of gout, Hyperparathyroidism due to renal insufficiency, Internal hemorrhoid, Long term current use of anticoagulant therapy, Mechanical heart valve present, Metabolic acidosis with normal anion gap and bicarbonate losses, Mixed hyperlipidemia, NSTEMI (non-ST elevated myocardial infarction), Obesity, diabetes, and hypertension syndrome, Paroxysmal atrial fibrillation, PVD (peripheral vascular disease), Renovascular hypertension, 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    Past Surgical History  He has a past surgical history that includes Cardiac valuve replacement; Cardiac catheterization; Coronary artery bypass graft; Coronary angioplasty; Cardiac valve replacement; Spine surgery; Vasectomy; Colon surgery; Colonoscopy (N/A, 03/31/2017); Hernia repair; Colonoscopy (N/A, 04/03/2018); Colonoscopy (N/A, 08/13/2018); Carpal tunnel release (Right, 05/19/2020); Coronary angiography (N/A, 10/04/2021); Coronary bypass graft angiography (10/04/2021); Phacoemulsification of cataract (Left, 11/13/2022); Intraocular prosthesis insertion (Left, 11/13/2022); Cataract extraction (Left, 11/13/2022); Phacoemulsification of cataract (Right, 12/04/2022); Intraocular prosthesis insertion (Right, 12/04/2022); Eye surgery; Back surgery; Coronary angiography (N/A, 3/2/2023); and Coronary bypass graft angiography (3/2/2023).    Family History  His family history includes Alcohol abuse in his father; Diabetes in his brother; Heart disease in his brother, father, mother, and sister; Heart failure in his brother, father, and mother; No Known Problems in his maternal aunt, maternal grandfather, maternal grandmother, maternal uncle, paternal aunt, paternal grandfather, paternal grandmother, paternal uncle, and sister.    Social History  He reports that he quit smoking about 42 years ago. His smoking use included cigarettes. He has a 20.00 pack-year smoking history. He has never been exposed to tobacco smoke. He has never used smokeless tobacco. He reports that he does not drink alcohol and does not use drugs.    Allergies  He is allergic to fosinopril and losartan.    Medications  He has a current medication list which includes the following prescription(s): albuterol, allopurinol, bumetanide, cephalexin, ferrous gluconate, glimepiride, loperamide, metoprolol succinate, midazolam, fish oil-omega-3 fatty acids, oxymetazoline,  "rosuvastatin, tramadol-acetaminophen 37.5-325 mg, and warfarin, and the following Facility-Administered Medications: sodium chloride 0.9%, ondansetron, ondansetron, phenylephrine hcl 2.5%, phenylephrine hcl 2.5%, proparacaine, proparacaine, tropicamide 1%, and tropicamide 1%.    Review of Systems   HENT: Positive for facial swelling, nosebleeds, runny nose and sinus pressure.      Respiratory:  Negative for cough.      Cardiovascular:  Positive for irregular heartbeat.            Objective:     /73 (BP Location: Right arm, Patient Position: Sitting, BP Method: Large (Automatic))   Pulse 73   Ht 5' 5" (1.651 m)   Wt 76 kg (167 lb 8.8 oz)   BMI 27.88 kg/m²        Constitutional:   Vital signs are normal. He appears well-developed and well-nourished.     Head:  Normocephalic and atraumatic.     Ears:  Hearing normal to normal and whispered voice; external ear normal without scars, lesions, or masses; ear canal, tympanic membrane, and middle ear normal..   Right Ear: No swelling. Tympanic membrane is not perforated and not bulging. No middle ear effusion.   Left Ear: No swelling. Tympanic membrane is not perforated and not bulging.  No middle ear effusion.     Nose:  Nose normal including turbinates, nasal mucosa, sinuses and nasal septum. No epistaxis.         Mouth/Throat  Oropharynx clear and moist without lesions or asymmetry and normal uvula midline. Normal dentition. No tonsillar abscesses. Tonsillar exudate.      Neck:  Neck normal without thyromegaly masses, asymmetry, normal tracheal structure, crepitus, and tenderness, thyroid normal, trachea normal, phonation normal, full range of motion with neck supple and no adenopathy. No stridor present.        Head (right side): No submental adenopathy present.        Head (left side): No submental adenopathy present.     He has no cervical adenopathy.     Pulmonary/Chest:   No stridor.     Procedure    Nasal cauterization performed.  See procedure " note.    Nasal Endoscopy with Repeat Control of Epistaxis    After written consent was obtained the nose was anesthetized with topical pontocaine and phenylephrine pledgets.  topical phenylephrine/lidocaine cotton pledgets was used to control the prominent vascularity on the septum. Posicept nasal packing was placed on the left to assist with clot formation and healing of the nasal mucosa.  The patient tolerated the procedure well and there were no complications.  Hemostasis was obtained.  Bacitracin ointment was placed after cauterization.                      Data Reviewed    Hemoglobin (g/dL)   Date Value   03/04/2023 9.4 (L)     Hematocrit (%)   Date Value   03/04/2023 30.0 (L)     Platelets (K/uL)   Date Value   03/04/2023 149 (L)     Prothrombin Time (sec)   Date Value   03/04/2023 13.0 (H)     aPTT (sec)   Date Value   12/31/2022 68.3 (H)   12/31/2022 65.8 (H)     Coumadin Monitoring INR (no units)   Date Value   03/15/2010 2.0 (H)     INR (no units)   Date Value   03/04/2023 1.3 (H)   01/31/2022 3.7         Assessment:     1. Anticoagulant long-term use    2. Acute anterior epistaxis    3. Stage 3b chronic kidney disease      Plan:     I had a long discussion with the patient and his wife  regarding his condition and the further workup and management options.      Dissolvable nasal pack placed on the left.     Do not blow nose for 1 week.  Sneeze with mouth open.  Do not take ibuprofen or aspirin for 1 week.  Place saline nasal gel in nostrils at bedtime.  May use saline nose drops during the day to prevent dryness.  If bleeding recurs, use oxymetazoline (Afrin) spray in the nostril and call for follow-up appointment.    Jono Russell MD

## 2023-03-10 RX ORDER — ENOXAPARIN SODIUM 100 MG/ML
80 INJECTION SUBCUTANEOUS DAILY
Qty: 8 ML | Refills: 1 | Status: ON HOLD | OUTPATIENT
Start: 2023-03-10 | End: 2023-03-22 | Stop reason: HOSPADM

## 2023-03-10 NOTE — PROGRESS NOTES
Mrs. Urbina advised on warfarin per calendar and Lovenox per pharmacist's note. She understands that she should contact the Coumadin Clinic for any questions or concerns, expressed understanding of these instructions via verbal teach back. INR scheduled for 03.13.23 at [facility].

## 2023-03-10 NOTE — PROGRESS NOTES
INR not at goal. Medications, chart, and patient findings reviewed. See calendar for adjustments to dose and follow up plan.  Pt s/p LHC & remains subtherapeutic.  Recommend he starts lovenox 80mg Q24hrs SUBQ. Ht=65in, wt=76kg, scr=2.3, crcl=24ml/min.

## 2023-03-13 ENCOUNTER — ANTI-COAG VISIT (OUTPATIENT)
Dept: CARDIOLOGY | Facility: CLINIC | Age: 83
End: 2023-03-13
Payer: MEDICARE

## 2023-03-13 ENCOUNTER — LAB VISIT (OUTPATIENT)
Dept: LAB | Facility: HOSPITAL | Age: 83
End: 2023-03-13
Payer: MEDICARE

## 2023-03-13 DIAGNOSIS — Z79.01 ANTICOAGULANT LONG-TERM USE: Primary | ICD-10-CM

## 2023-03-13 DIAGNOSIS — Z95.2 H/O MECHANICAL AORTIC VALVE REPLACEMENT: ICD-10-CM

## 2023-03-13 DIAGNOSIS — Z79.01 LONG TERM CURRENT USE OF ANTICOAGULANT THERAPY: ICD-10-CM

## 2023-03-13 LAB
INR PPP: 1.4 (ref 0.8–1.2)
PROTHROMBIN TIME: 14.5 SEC (ref 9–12.5)

## 2023-03-13 PROCEDURE — 93793 PR ANTICOAGULANT MGMT FOR PT TAKING WARFARIN: ICD-10-PCS | Mod: S$GLB,,,

## 2023-03-13 PROCEDURE — 85610 PROTHROMBIN TIME: CPT | Mod: HCNC | Performed by: INTERNAL MEDICINE

## 2023-03-13 PROCEDURE — 93793 ANTICOAG MGMT PT WARFARIN: CPT | Mod: S$GLB,,,

## 2023-03-13 PROCEDURE — 36415 COLL VENOUS BLD VENIPUNCTURE: CPT | Mod: HCNC | Performed by: INTERNAL MEDICINE

## 2023-03-13 NOTE — PROGRESS NOTES
INR not at goal. Medications, chart, and patient findings reviewed. See calendar for adjustments to dose and follow up plan.  Pt continues to be subtherapeutic.  Recommend to be more aggressive w/ dosing to get pt WNL.  Will adjust per calendar & pt to continue lovenox.  Will re-assess Thursday.

## 2023-03-16 ENCOUNTER — LAB VISIT (OUTPATIENT)
Dept: LAB | Facility: HOSPITAL | Age: 83
End: 2023-03-16
Payer: MEDICARE

## 2023-03-16 ENCOUNTER — ANTI-COAG VISIT (OUTPATIENT)
Dept: CARDIOLOGY | Facility: CLINIC | Age: 83
End: 2023-03-16
Payer: MEDICARE

## 2023-03-16 DIAGNOSIS — Z79.01 LONG TERM CURRENT USE OF ANTICOAGULANT THERAPY: ICD-10-CM

## 2023-03-16 DIAGNOSIS — Z79.01 ANTICOAGULANT LONG-TERM USE: Primary | ICD-10-CM

## 2023-03-16 DIAGNOSIS — Z95.2 H/O MECHANICAL AORTIC VALVE REPLACEMENT: ICD-10-CM

## 2023-03-16 LAB
INR PPP: 2 (ref 0.8–1.2)
PROTHROMBIN TIME: 19.5 SEC (ref 9–12.5)

## 2023-03-16 PROCEDURE — 36415 COLL VENOUS BLD VENIPUNCTURE: CPT | Mod: HCNC | Performed by: INTERNAL MEDICINE

## 2023-03-16 PROCEDURE — 93793 PR ANTICOAGULANT MGMT FOR PT TAKING WARFARIN: ICD-10-PCS | Mod: S$GLB,,,

## 2023-03-16 PROCEDURE — 93793 ANTICOAG MGMT PT WARFARIN: CPT | Mod: S$GLB,,,

## 2023-03-16 PROCEDURE — 85610 PROTHROMBIN TIME: CPT | Mod: HCNC | Performed by: INTERNAL MEDICINE

## 2023-03-16 NOTE — PROGRESS NOTES
INR at goal. Medications and chart reviewed. No changes noted to necessitate adjustment of warfarin or follow-up plan. See calendar.  Ok to d/c lovenox at this time.  Will increase per calendar to keep pt in range. Pending KIRSTEN.

## 2023-03-22 ENCOUNTER — HOSPITAL ENCOUNTER (OUTPATIENT)
Facility: HOSPITAL | Age: 83
Discharge: HOME | End: 2023-03-22
Attending: INTERNAL MEDICINE | Admitting: INTERNAL MEDICINE
Payer: MEDICARE

## 2023-03-22 ENCOUNTER — ANTI-COAG VISIT (OUTPATIENT)
Dept: CARDIOLOGY | Facility: CLINIC | Age: 83
End: 2023-03-22
Payer: MEDICARE

## 2023-03-22 ENCOUNTER — HOSPITAL ENCOUNTER (OUTPATIENT)
Dept: CARDIOLOGY | Facility: HOSPITAL | Age: 83
Discharge: HOME | End: 2023-03-22
Attending: INTERNAL MEDICINE | Admitting: INTERNAL MEDICINE
Payer: MEDICARE

## 2023-03-22 VITALS
OXYGEN SATURATION: 96 % | SYSTOLIC BLOOD PRESSURE: 156 MMHG | BODY MASS INDEX: 28.49 KG/M2 | WEIGHT: 171 LBS | DIASTOLIC BLOOD PRESSURE: 81 MMHG | HEIGHT: 65 IN | TEMPERATURE: 97 F | HEART RATE: 91 BPM | RESPIRATION RATE: 18 BRPM

## 2023-03-22 DIAGNOSIS — Z95.2 H/O MECHANICAL AORTIC VALVE REPLACEMENT: ICD-10-CM

## 2023-03-22 DIAGNOSIS — I34.0 MITRAL INSUFFICIENCY: ICD-10-CM

## 2023-03-22 DIAGNOSIS — Z79.01 ANTICOAGULANT LONG-TERM USE: Primary | ICD-10-CM

## 2023-03-22 LAB
INR PPP: 4.6 (ref 0.8–1.2)
PROTHROMBIN TIME: 44.6 SEC (ref 9–12.5)

## 2023-03-22 PROCEDURE — 85610 PROTHROMBIN TIME: CPT | Mod: HCNC | Performed by: INTERNAL MEDICINE

## 2023-03-22 PROCEDURE — 93793 ANTICOAG MGMT PT WARFARIN: CPT | Mod: S$GLB,,,

## 2023-03-22 PROCEDURE — 93793 PR ANTICOAGULANT MGMT FOR PT TAKING WARFARIN: ICD-10-PCS | Mod: S$GLB,,,

## 2023-03-22 RX ORDER — SODIUM CHLORIDE 0.9 % (FLUSH) 0.9 %
10 SYRINGE (ML) INJECTION
Status: DISCONTINUED | OUTPATIENT
Start: 2023-03-22 | End: 2023-03-22 | Stop reason: HOSPADM

## 2023-03-22 RX ORDER — SODIUM CHLORIDE 0.9 % (FLUSH) 0.9 %
10 SYRINGE (ML) INJECTION
Status: CANCELLED | OUTPATIENT
Start: 2023-03-22

## 2023-03-22 RX ORDER — ISOSORBIDE MONONITRATE 60 MG/1
60 TABLET, EXTENDED RELEASE ORAL NIGHTLY
COMMUNITY
End: 2023-06-29 | Stop reason: SDUPTHER

## 2023-03-22 RX ORDER — LISINOPRIL 2.5 MG/1
2.5 TABLET ORAL DAILY
Status: ON HOLD | COMMUNITY
End: 2023-04-15 | Stop reason: HOSPADM

## 2023-03-22 NOTE — HPI
Patient is an 82 year old male with history of CAD s/p CABG in 1990s, Mechanical AV on Warfarin, Mod-severe MR, CKD Stage IV that presents for KIRSTEN for mitral clip workup     TTE 12/2022: mod severe MR, mod TR, LVEF 48%, severe LAE, mechanical AV with DI 0.38 and mean grad 18 mm Hg    Dysphagia or odynophagia:  No  Liver Disease, esophageal disease, or known varices:  No  Upper GI Bleeding: No  Prior neck surgery or radiation:  No  History of anesthetic difficulties:  No  Family history of anesthetic difficulties:  No  Able to move neck in all directions:  Yes  Snoring:  Yes  Sleep Apnea:  No  Last oral intake:  12 hours ago

## 2023-03-22 NOTE — ASSESSMENT & PLAN NOTE
Patient is an 82 year old male with history of CAD s/p CABG in 1990s, Mechanical AV on Warfarin, Mod-severe MR, CKD Stage IV that presents for KIRSTEN for mitral clip workup     TTE 12/2022: mod severe MR, mod TR, LVEF 48%, severe LAE, mechanical AV with DI 0.38 and mean grad 18 mm Hg    1. KIRSTEN for evaluation of mitral valve  INR was 4.63 for mechanical AV valve anticoagulation , discussed with patient about risks and benefits of procedure, through shared decision making, patient will be rescheduled

## 2023-03-22 NOTE — H&P
[facility] - Short Stay Cardiac Unit  Cardiology  History and Physical     Patient Name: Dariel Urbina  MRN: [id number]  Admission Date: (Not on file)  Code Status: Prior   Attending Provider: No att. providers found   Primary Care Physician: Devon Langston MD  Principal Problem:Mitral insufficiency    Patient information was obtained from patient, past medical records and ER records.     Subjective:     Chief Complaint: mitral insufficiency    HPI:  Patient is an 82 year old male with history of CAD s/p CABG in 1990s, Mechanical AV on Warfarin, Mod-severe MR, CKD Stage IV that presents for IKRSTEN for mitral clip workup     TTE 12/2022: mod severe MR, mod TR, LVEF 48%, severe LAE, mechanical AV with DI 0.38 and mean grad 18 mm Hg    Dysphagia or odynophagia:  No  Liver Disease, esophageal disease, or known varices:  No  Upper GI Bleeding: No  Prior neck surgery or radiation:  No  History of anesthetic difficulties:  No  Family history of anesthetic difficulties:  No  Able to move neck in all directions:  Yes  Snoring:  Yes  Sleep Apnea:  No  Last oral intake:  12 hours ago        Past Medical History:   Diagnosis Date    Anemia of chronic renal failure, stage 3 (moderate) 05/27/2015    Anticoagulant long-term use     Atherosclerosis of coronary artery bypass graft of native heart without angina pectoris 09/11/2012    3-27-18 [facility] Two vessel coronary artery disease.   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    Bilateral carotid artery disease 02/09/2017    Bleeding from the nose     Bleeding nose 03/21/2018    Cataract     CHF (congestive heart failure)     CKD (chronic kidney disease) stage 3, GFR 30-59 ml/min 05/27/2015    Claudication of left lower extremity 09/17/2014    Colon polyp     Encounter for blood transfusion     Gastroesophageal reflux disease without esophagitis 03/19/2018    Gastrointestinal hemorrhage associated with intestinal diverticulosis 04/01/2018    Glaucoma     H/O  mechanical aortic valve replacement 09/17/2014    History of gout 09/26/2012    Hyperparathyroidism due to renal insufficiency 07/27/2015    Internal hemorrhoid 04/03/2018    Long term current use of anticoagulant therapy 09/26/2012    Mechanical heart valve present     Metabolic acidosis with normal anion gap and bicarbonate losses 03/20/2018    Mixed hyperlipidemia 09/26/2012    NSTEMI (non-ST elevated myocardial infarction) 03/21/2018    Obesity, diabetes, and hypertension syndrome 02/23/2016    Paroxysmal atrial fibrillation 02/06/2023    PVD (peripheral vascular disease) 09/11/2012    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       Past Surgical History:   Procedure Laterality Date    BACK SURGERY      CARDIAC CATHETERIZATION      CARDIAC VALVE REPLACEMENT      CARDIAC VALVE SURGERY      CARPAL TUNNEL RELEASE Right 05/19/2020    Procedure: RELEASE, CARPAL TUNNEL;  Surgeon: Rupesh Norris Jr., MD;  Location: [facility];  Service: Plastics;  Laterality: Right;    CATARACT EXTRACTION Left 11/13/2022        COLON SURGERY      COLONOSCOPY N/A 03/31/2017    Procedure: COLONOSCOPY;  Surgeon: Bruno Raymond MD;  Location: [facility] (4TH FLR);  Service: Endoscopy;  Laterality: N/A;  Patient's wife requesting date.    COLONOSCOPY N/A 04/03/2018    Procedure: COLONOSCOPY;  Surgeon: Bonifacio Pelletier MD;  Location: [facility] (2ND FLR);  Service: Endoscopy;  Laterality: N/A;    COLONOSCOPY N/A 08/13/2018    Procedure: COLONOSCOPY;  Surgeon: Kam Barba MD;  Location: [facility] ENDO (2ND FLR);  Service: Endoscopy;  Laterality: N/A;  2nd floor: PA pressure 49; hx of moderate-severe valve disease     per Coumadin  clinic-Patient can hold 5 days with lovenox bridge       ok to schedule per Katarina    CORONARY ANGIOGRAPHY N/A 10/04/2021    Procedure: Left heart cath +/- peripheral angiogram;  Surgeon: Jose Ruiz MD;  Location: [facility] CATH LAB;  Service: Cardiology;  Laterality: N/A;    CORONARY ANGIOGRAPHY N/A 3/2/2023    Procedure: Angiogram, Coronary;  Surgeon: Devon Garnica MD;  Location: [facility] CATH LAB;  Service: Cardiology;  Laterality: N/A;    CORONARY ANGIOPLASTY      CORONARY ARTERY BYPASS GRAFT      CORONARY BYPASS GRAFT ANGIOGRAPHY  10/04/2021    Procedure: Bypass graft study;  Surgeon: Jose Ruiz MD;  Location: [facility] CATH LAB;  Service: Cardiology;;    CORONARY BYPASS GRAFT ANGIOGRAPHY  3/2/2023    Procedure: Bypass graft study;  Surgeon: Devon Garnica MD;  Location: [facility] CATH LAB;  Service: Cardiology;;    EYE SURGERY      HERNIA REPAIR      INTRAOCULAR PROSTHESES INSERTION Left 11/13/2022    Procedure: INSERTION, IOL PROSTHESIS;  Surgeon: Alia Mckeon MD;  Location: [facility] OR [address] [place] [address];  Service: Ophthalmology;  Laterality: Left;    INTRAOCULAR PROSTHESES INSERTION Right 12/04/2022    Procedure: INSERTION, IOL PROSTHESIS;  Surgeon: Alia Mckeon MD;  Location: [facility] OR [address] [place] [address];  Service: Ophthalmology;  Laterality: Right;    PHACOEMULSIFICATION OF CATARACT Left 11/13/2022    Procedure: PHACOEMULSIFICATION, CATARACT;  Surgeon: Alia Mckeon MD;  Location: [facility] OR [address] [place] [address];  Service: Ophthalmology;  Laterality: Left;    PHACOEMULSIFICATION OF CATARACT Right 12/04/2022    Procedure: PHACOEMULSIFICATION, CATARACT;  Surgeon: Alia Mckeon MD;  Location: [facility] OR [address] [place] [address];  Service: Ophthalmology;  Laterality: Right;    SPINE SURGERY      VASECTOMY         Review of patient's allergies indicates:   Allergen Reactions    Fosinopril      Intolerance- elevates potassium level      Losartan      Intolerance- elevates potassium level       Current Facility-Administered Medications on File Prior to  Encounter   Medication    0.9%  NaCl infusion    ondansetron injection 4 mg    ondansetron injection 4 mg    phenylephrine HCL 2.5% ophthalmic solution 1 drop    phenylephrine HCL 2.5% ophthalmic solution 1 drop    proparacaine 0.5 % ophthalmic solution 1 drop    proparacaine 0.5 % ophthalmic solution 1 drop    tropicamide 1% ophthalmic solution 1 drop    tropicamide 1% ophthalmic solution 1 drop     Current Outpatient Medications on File Prior to Encounter   Medication Sig    albuterol (VENTOLIN HFA) 90 mcg/actuation inhaler Inhale 2 puffs into the lungs every 6 (six) hours as needed for Wheezing. Rescue    allopurinoL (ZYLOPRIM) 100 MG tablet TAKE 1 TABLET EVERY DAY    bumetanide (BUMEX) 1 MG tablet Take 1 tablet (1 mg total) by mouth once daily.    ferrous gluconate (FERGON) 324 MG tablet Take 1 tablet (324 mg total) by mouth daily with breakfast.    glimepiride (AMARYL) 1 MG tablet     loperamide (IMODIUM) 2 mg capsule Take 2 mg by mouth 4 (four) times daily as needed for Diarrhea.    metoprolol succinate (TOPROL-XL) 25 MG 24 hr tablet Take 1 tablet (25 mg total) by mouth once daily.    midazolam (VERSED) 1 mg/mL injection     omega-3 fatty acids/fish oil (FISH OIL-OMEGA-3 FATTY ACIDS) 300-1,000 mg capsule Take 1 capsule by mouth once daily.    oxymetazoline (AFRIN) 0.05 % nasal spray Use 2 sprays by Nasal route daily as needed (nose bleed).    rosuvastatin (CRESTOR) 40 MG Tab TAKE 1 TABLET EVERY EVENING.    tramadol-acetaminophen 37.5-325 mg (ULTRACET) 37.5-325 mg Tab 1 tablet by Per J Tube route every 12 (twelve) hours as needed for Pain (pain).    warfarin (COUMADIN) 5 MG tablet warfarin 7.5 (1.5 tablets) sun and Thursday, and 5mg other days     Family History       Problem Relation (Age of Onset)    Alcohol abuse Father    Diabetes Brother    Heart disease Mother, Father, Brother, Sister    Heart failure Mother, Father, Brother    No Known Problems Sister, Maternal Grandmother, Maternal Grandfather, Paternal  Grandmother, Paternal Grandfather, Maternal Aunt, Maternal Uncle, Paternal Aunt, Paternal Uncle          Tobacco Use    Smoking status: Former     Packs/day: 1.00     Years: 20.00     Pack years: 20.00     Types: Cigarettes     Quit date: 1980     Years since quittin.5     Passive exposure: Never    Smokeless tobacco: Never   Substance and Sexual Activity    Alcohol use: No    Drug use: No    Sexual activity: Not Currently     Partners: Female     Review of Systems   Constitutional: Negative for chills and fever.   Cardiovascular:  Negative for chest pain, orthopnea and palpitations.   Respiratory:  Negative for cough and shortness of breath.    Gastrointestinal:  Negative for abdominal pain.   Neurological:  Negative for dizziness, headaches and light-headedness.   Psychiatric/Behavioral:  Negative for altered mental status.    Objective:     Vital Signs (Most Recent):    Vital Signs (24h Range):  BP: ()/()   Arterial Line BP: ()/()         There is no height or weight on file to calculate BMI.            No intake or output data in the 24 hours ending 23 0646    Lines/Drains/Airways       None                   Physical Exam  Vitals and nursing note reviewed.   Constitutional:       General: He is not in acute distress.     Appearance: Normal appearance. He is not toxic-appearing.   HENT:      Head: Normocephalic and atraumatic.      Mouth/Throat:      Mouth: Mucous membranes are moist.   Cardiovascular:      Rate and Rhythm: Normal rate and regular rhythm.      Heart sounds: Murmur (at the apex at the 5th intercostal space) heard.     No friction rub. No gallop.   Pulmonary:      Effort: Pulmonary effort is normal. No respiratory distress.      Breath sounds: Normal breath sounds.   Abdominal:      Palpations: Abdomen is soft.   Musculoskeletal:      Right lower leg: No edema.      Left lower leg: No edema.   Skin:     General: Skin is warm.   Neurological:      Mental Status: He is alert and  oriented to person, place, and time. Mental status is at baseline.     Significant Labs: BMP: No results for input(s): GLU, NA, K, CL, CO2, BUN, CREATININE, CALCIUM, MG in the last 48 hours., CMP No results for input(s): NA, K, CL, CO2, GLU, BUN, CREATININE, CALCIUM, PROT, ALBUMIN, BILITOT, ALKPHOS, AST, ALT, ANIONGAP, ESTGFRAFRICA, EGFRNONAA in the last 48 hours., CBC No results for input(s): WBC, HGB, HCT, PLT in the last 48 hours., INR No results for input(s): INR, PROTIME in the last 48 hours., and All pertinent lab results from the last 24 hours have been reviewed.    Significant Imaging: Echocardiogram: Transthoracic echo (TTE) complete ([facility] Only):   Results for orders placed or performed during the hospital encounter of 12/29/22   Echo   Result Value Ref Range    BSA 1.9 m2    TDI SEPTAL 0.06 m/s    LV LATERAL E/E' RATIO 17.88 m/s    LV SEPTAL E/E' RATIO 23.83 m/s    LA WIDTH 5.80 cm    TDI LATERAL 0.08 m/s    LVIDd 5.21 3.5 - 6.0 cm    IVS 1.15 (A) 0.6 - 1.1 cm    Posterior Wall 1.12 (A) 0.6 - 1.1 cm    LVIDs 4.09 (A) 2.1 - 4.0 cm    FS 21 28 - 44 %    LA volume 194.02 cm3    Sinus 3.15 cm    STJ 2.49 cm    Ascending aorta 2.61 cm    LV mass 231.14 g    LA size 5.44 cm    RVDD 3.95 cm    TAPSE 1.73 cm    Left Ventricle Relative Wall Thickness 0.43 cm    AV mean gradient 17 mmHg    AV valve area 1.25 cm2    AV Velocity Ratio 0.36     AV index (prosthetic) 0.38     MV mean gradient 4 mmHg    MV valve area p 1/2 method 4.64 cm2    MV valve area by continuity eq 2.41 cm2    E/A ratio 4.33     Mean e' 0.07 m/s    E wave deceleration time 163.38 msec    LVOT diameter 2.06 cm    LVOT area 3.3 cm2    LVOT peak ashkan 1.09 m/s    LVOT peak VTI 21.00 cm    Ao peak ashkan 3.06 m/s    Ao VTI 55.81 cm    Mr max ashkan 0.06 m/s    LVOT stroke volume 69.96 cm3    AV peak gradient 37 mmHg    MV peak gradient 11 mmHg    E/E' ratio 20.43 m/s    MV Peak E Ash[place] 1.43 m/s    TR Max Ashkan 3.85 m/s    MV VTI 29.04 cm    MV stenosis pressure  1/2 time 47.38 ms    MV Peak A Ashkan 0.33 m/s    LV Systolic Volume 73.61 mL    LV Systolic Volume Index 39.6 mL/m2    LV Diastolic Volume 130.05 mL    LV Diastolic Volume Index 69.92 mL/m2    LA Volume Index 104.3 mL/m2    LV Mass Index 124 g/m2    RA Major Axis 5.43 cm    Left Atrium Minor Axis 7.30 cm    Left Atrium Major Axis 7.17 cm    Triscuspid Valve Regurgitation Peak Gradient 59 mmHg    LA Volume Index (Mod) 93.6 mL/m2    LA volume (mod) 174.14 cm3    RA Width 3.59 cm    Right Atrial Pressure (from IVC) 3 mmHg    EF 48 %    TV rest pulmonary artery pressure 62 mmHg    Narrative    · The left ventricle is normal in size with mild concentric hypertrophy   and mildly decreased systolic function.  · The estimated ejection fraction is 48%.  · There are segmental left ventricular wall motion abnormalities.  · There is abnormal septal wall motion.  · Grade III left ventricular diastolic dysfunction.  · Severe left atrial enlargement.  · Normal right ventricular size with normal right ventricular systolic   function.  · Mild right atrial enlargement.  · There is a mechanical aortic valve present.  · The aortic valve mean gradient is 17 mmHg with a dimensionless index of   0.38.  · Moderate to moderate -severe ( 2-3+) MR  · Moderate tricuspid regurgitation.  · Normal central venous pressure (3 mmHg).  · The estimated PA systolic pressure is 62 mmHg.  · There is at least moderate pulmonary hypertension.        Assessment and Plan:     Mitral insufficiency  Patient is an 82 year old male with history of CAD s/p CABG in 1990s, Mechanical AV on Warfarin, Mod-severe MR, CKD Stage IV that presents for KIRSTEN for mitral clip workup     TTE 12/2022: mod severe MR, mod TR, LVEF 48%, severe LAE, mechanical AV with DI 0.38 and mean grad 18 mm Hg    1. KIRSTEN for evaluation of mitral valve and level of insufficiency  INR today for mechanical AV valve         VTE Risk Mitigation (From admission, onward)      None          Staff  attestation to follow. Further recommendations per attending addendum    Anthony Camacho MD  Cardiology PGY5  [facility] - Short Stay Cardiac Unit

## 2023-03-22 NOTE — ASSESSMENT & PLAN NOTE
Patient is an 82 year old male with history of CAD s/p CABG in 1990s, Mechanical AV on Warfarin, Mod-severe MR, CKD Stage IV that presents for KIRSTEN for mitral clip workup     TTE 12/2022: mod severe MR, mod TR, LVEF 48%, severe LAE, mechanical AV with DI 0.38 and mean grad 18 mm Hg    1. KIRSTEN for evaluation of mitral valve and level of insufficiency  -No absolute contraindications of esophageal stricture, tumor, perforation, laceration,or diverticulum and/or active GI bleed  -The risks, benefits & alternatives of the procedure were explained to the patient.   -The risks of transesophageal echo include but are not limited to:  Dental trauma, esophageal trauma/perforation, bleeding, laryngospasm/brochospasm, aspiration, sore throat/hoarseness, & dislodgement of the endotracheal tube/nasogastric tube (where applicable).    -The risks of moderate sedation include hypotension, respiratory depression, arrhythmias, bronchospasm, & death.    -Informed consent was obtained. The patient is agreeable to proceed with the procedure and all questions and concerns addressed.

## 2023-03-22 NOTE — SUBJECTIVE & OBJECTIVE
Past Medical History:   Diagnosis Date    Anemia of chronic renal failure, stage 3 (moderate) 05/27/2015    Anticoagulant long-term use     Atherosclerosis of coronary artery bypass graft of native heart without angina pectoris 09/11/2012    3-27-18 [facility] Two vessel coronary artery disease.   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    Bilateral carotid artery disease 02/09/2017    Bleeding from the nose     Bleeding nose 03/21/2018    Cataract     CHF (congestive heart failure)     CKD (chronic kidney disease) stage 3, GFR 30-59 ml/min 05/27/2015    Claudication of left lower extremity 09/17/2014    Colon polyp     Encounter for blood transfusion     Gastroesophageal reflux disease without esophagitis 03/19/2018    Gastrointestinal hemorrhage associated with intestinal diverticulosis 04/01/2018    Glaucoma     H/O mechanical aortic valve replacement 09/17/2014    History of gout 09/26/2012    Hyperparathyroidism due to renal insufficiency 07/27/2015    Internal hemorrhoid 04/03/2018    Long term current use of anticoagulant therapy 09/26/2012    Mechanical heart valve present     Metabolic acidosis with normal anion gap and bicarbonate losses 03/20/2018    Mixed hyperlipidemia 09/26/2012    NSTEMI (non-ST elevated myocardial infarction) 03/21/2018    Obesity, diabetes, and hypertension syndrome 02/23/2016    Paroxysmal atrial fibrillation 02/06/2023    PVD (peripheral vascular disease) 09/11/2012    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       Past Surgical History:   Procedure Laterality Date    BACK SURGERY      CARDIAC CATHETERIZATION      CARDIAC VALVE REPLACEMENT      CARDIAC VALVE SURGERY       CARPAL TUNNEL RELEASE Right 05/19/2020    Procedure: RELEASE, CARPAL TUNNEL;  Surgeon: Rupesh Norris Jr., MD;  Location: [facility];  Service: Plastics;  Laterality: Right;    CATARACT EXTRACTION Left 11/13/2022        COLON SURGERY      COLONOSCOPY N/A 03/31/2017    Procedure: COLONOSCOPY;  Surgeon: Bruno Raymond MD;  Location: [facility] ENDO (4TH FLR);  Service: Endoscopy;  Laterality: N/A;  Patient's wife requesting date.    COLONOSCOPY N/A 04/03/2018    Procedure: COLONOSCOPY;  Surgeon: Bonifacio Pelletier MD;  Location: [facility] ENDO (2ND FLR);  Service: Endoscopy;  Laterality: N/A;    COLONOSCOPY N/A 08/13/2018    Procedure: COLONOSCOPY;  Surgeon: Kam Barba MD;  Location: [facility] ENDO (2ND FLR);  Service: Endoscopy;  Laterality: N/A;  2nd floor: PA pressure 49; hx of moderate-severe valve disease     per Coumadin clinic-Patient can hold 5 days with lovenox bridge       ok to schedule per Katarina    CORONARY ANGIOGRAPHY N/A 10/04/2021    Procedure: Left heart cath +/- peripheral angiogram;  Surgeon: Jose Ruiz MD;  Location: [facility] CATH LAB;  Service: Cardiology;  Laterality: N/A;    CORONARY ANGIOGRAPHY N/A 3/2/2023    Procedure: Angiogram, Coronary;  Surgeon: Devon Garnica MD;  Location: [facility] CATH LAB;  Service: Cardiology;  Laterality: N/A;    CORONARY ANGIOPLASTY      CORONARY ARTERY BYPASS GRAFT      CORONARY BYPASS GRAFT ANGIOGRAPHY  10/04/2021    Procedure: Bypass graft study;  Surgeon: Jose Ruiz MD;  Location: [facility] CATH LAB;  Service: Cardiology;;    CORONARY BYPASS GRAFT ANGIOGRAPHY  3/2/2023    Procedure: Bypass graft study;  Surgeon: Devon Garnica MD;  Location: [facility] CATH LAB;  Service: Cardiology;;    EYE SURGERY      HERNIA REPAIR      INTRAOCULAR PROSTHESES INSERTION Left 11/13/2022    Procedure: INSERTION, IOL PROSTHESIS;  Surgeon: Alia Mckeon MD;  Location: [facility] 1ST FLR;  Service: Ophthalmology;  Laterality: Left;    INTRAOCULAR PROSTHESES INSERTION Right 12/04/2022     Procedure: INSERTION, IOL PROSTHESIS;  Surgeon: Alia Mckeon MD;  Location: [facility] OR [address] [place] [address];  Service: Ophthalmology;  Laterality: Right;    PHACOEMULSIFICATION OF CATARACT Left 11/13/2022    Procedure: PHACOEMULSIFICATION, CATARACT;  Surgeon: Alia Mckeon MD;  Location: [facility] OR [address] [place] [address];  Service: Ophthalmology;  Laterality: Left;    PHACOEMULSIFICATION OF CATARACT Right 12/04/2022    Procedure: PHACOEMULSIFICATION, CATARACT;  Surgeon: Alia Mckeon MD;  Location: [facility] OR [address] [place] [address];  Service: Ophthalmology;  Laterality: Right;    SPINE SURGERY      VASECTOMY         Review of patient's allergies indicates:   Allergen Reactions    Fosinopril      Intolerance- elevates potassium level      Losartan      Intolerance- elevates potassium level       Current Facility-Administered Medications on File Prior to Encounter   Medication    0.9%  NaCl infusion    ondansetron injection 4 mg    ondansetron injection 4 mg    phenylephrine HCL 2.5% ophthalmic solution 1 drop    phenylephrine HCL 2.5% ophthalmic solution 1 drop    proparacaine 0.5 % ophthalmic solution 1 drop    proparacaine 0.5 % ophthalmic solution 1 drop    tropicamide 1% ophthalmic solution 1 drop    tropicamide 1% ophthalmic solution 1 drop     Current Outpatient Medications on File Prior to Encounter   Medication Sig    albuterol (VENTOLIN HFA) 90 mcg/actuation inhaler Inhale 2 puffs into the lungs every 6 (six) hours as needed for Wheezing. Rescue    allopurinoL (ZYLOPRIM) 100 MG tablet TAKE 1 TABLET EVERY DAY    bumetanide (BUMEX) 1 MG tablet Take 1 tablet (1 mg total) by mouth once daily.    ferrous gluconate (FERGON) 324 MG tablet Take 1 tablet (324 mg total) by mouth daily with breakfast.    glimepiride (AMARYL) 1 MG tablet     loperamide (IMODIUM) 2 mg capsule Take 2 mg by mouth 4 (four) times daily as needed for Diarrhea.    metoprolol succinate (TOPROL-XL) 25 MG 24 hr tablet Take 1 tablet (25 mg total) by mouth once daily.    midazolam  (VERSED) 1 mg/mL injection     omega-3 fatty acids/fish oil (FISH OIL-OMEGA-3 FATTY ACIDS) 300-1,000 mg capsule Take 1 capsule by mouth once daily.    oxymetazoline (AFRIN) 0.05 % nasal spray Use 2 sprays by Nasal route daily as needed (nose bleed).    rosuvastatin (CRESTOR) 40 MG Tab TAKE 1 TABLET EVERY EVENING.    tramadol-acetaminophen 37.5-325 mg (ULTRACET) 37.5-325 mg Tab 1 tablet by Per J Tube route every 12 (twelve) hours as needed for Pain (pain).    warfarin (COUMADIN) 5 MG tablet warfarin 7.5 (1.5 tablets) sun and Thursday, and 5mg other days     Family History       Problem Relation (Age of Onset)    Alcohol abuse Father    Diabetes Brother    Heart disease Mother, Father, Brother, Sister    Heart failure Mother, Father, Brother    No Known Problems Sister, Maternal Grandmother, Maternal Grandfather, Paternal Grandmother, Paternal Grandfather, Maternal Aunt, Maternal Uncle, Paternal Aunt, Paternal Uncle          Tobacco Use    Smoking status: Former     Packs/day: 1.00     Years: 20.00     Pack years: 20.00     Types: Cigarettes     Quit date: 1980     Years since quittin.5     Passive exposure: Never    Smokeless tobacco: Never   Substance and Sexual Activity    Alcohol use: No    Drug use: No    Sexual activity: Not Currently     Partners: Female     Review of Systems   Constitutional: Negative for chills and fever.   Cardiovascular:  Negative for chest pain, orthopnea and palpitations.   Respiratory:  Negative for cough and shortness of breath.    Gastrointestinal:  Negative for abdominal pain.   Neurological:  Negative for dizziness, headaches and light-headedness.   Psychiatric/Behavioral:  Negative for altered mental status.    Objective:     Vital Signs (Most Recent):    Vital Signs (24h Range):  BP: ()/()   Arterial Line BP: ()/()         There is no height or weight on file to calculate BMI.            No intake or output data in the 24 hours ending 23 0646    Lines/Drains/Airways        None                   Physical Exam  Vitals and nursing note reviewed.   Constitutional:       General: He is not in acute distress.     Appearance: Normal appearance. He is not toxic-appearing.   HENT:      Head: Normocephalic and atraumatic.      Mouth/Throat:      Mouth: Mucous membranes are moist.   Cardiovascular:      Rate and Rhythm: Normal rate and regular rhythm.      Heart sounds: Murmur (at the apex at the 5th intercostal space) heard.     No friction rub. No gallop.   Pulmonary:      Effort: Pulmonary effort is normal. No respiratory distress.      Breath sounds: Normal breath sounds.   Abdominal:      Palpations: Abdomen is soft.   Musculoskeletal:      Right lower leg: No edema.      Left lower leg: No edema.   Skin:     General: Skin is warm.   Neurological:      Mental Status: He is alert and oriented to person, place, and time. Mental status is at baseline.     Significant Labs: BMP: No results for input(s): GLU, NA, K, CL, CO2, BUN, CREATININE, CALCIUM, MG in the last 48 hours., CMP No results for input(s): NA, K, CL, CO2, GLU, BUN, CREATININE, CALCIUM, PROT, ALBUMIN, BILITOT, ALKPHOS, AST, ALT, ANIONGAP, ESTGFRAFRICA, EGFRNONAA in the last 48 hours., CBC No results for input(s): WBC, HGB, HCT, PLT in the last 48 hours., INR No results for input(s): INR, PROTIME in the last 48 hours., and All pertinent lab results from the last 24 hours have been reviewed.    Significant Imaging: Echocardiogram: Transthoracic echo (TTE) complete (Cupid Only):   Results for orders placed or performed during the hospital encounter of 12/29/22   Echo   Result Value Ref Range    BSA 1.9 m2    TDI SEPTAL 0.06 m/s    LV LATERAL E/E' RATIO 17.88 m/s    LV SEPTAL E/E' RATIO 23.83 m/s    LA WIDTH 5.80 cm    TDI LATERAL 0.08 m/s    LVIDd 5.21 3.5 - 6.0 cm    IVS 1.15 (A) 0.6 - 1.1 cm    Posterior Wall 1.12 (A) 0.6 - 1.1 cm    LVIDs 4.09 (A) 2.1 - 4.0 cm    FS 21 28 - 44 %    LA volume 194.02 cm3    Sinus 3.15 cm     STJ 2.49 cm    Ascending aorta 2.61 cm    LV mass 231.14 g    LA size 5.44 cm    RVDD 3.95 cm    TAPSE 1.73 cm    Left Ventricle Relative Wall Thickness 0.43 cm    AV mean gradient 17 mmHg    AV valve area 1.25 cm2    AV Velocity Ratio 0.36     AV index (prosthetic) 0.38     MV mean gradient 4 mmHg    MV valve area p 1/2 method 4.64 cm2    MV valve area by continuity eq 2.41 cm2    E/A ratio 4.33     Mean e' 0.07 m/s    E wave deceleration time 163.38 msec    LVOT diameter 2.06 cm    LVOT area 3.3 cm2    LVOT peak ashkan 1.09 m/s    LVOT peak VTI 21.00 cm    Ao peak ashkan 3.06 m/s    Ao VTI 55.81 cm    Mr max ashkan 0.06 m/s    LVOT stroke volume 69.96 cm3    AV peak gradient 37 mmHg    MV peak gradient 11 mmHg    E/E' ratio 20.43 m/s    MV Peak E Ashkan 1.43 m/s    TR Max Ashkan 3.85 m/s    MV VTI 29.04 cm    MV stenosis pressure 1/2 time 47.38 ms    MV Peak A Ashkan 0.33 m/s    LV Systolic Volume 73.61 mL    LV Systolic Volume Index 39.6 mL/m2    LV Diastolic Volume 130.05 mL    LV Diastolic Volume Index 69.92 mL/m2    LA Volume Index 104.3 mL/m2    LV Mass Index 124 g/m2    RA Major Axis 5.43 cm    Left Atrium Minor Axis 7.30 cm    Left Atrium Major Axis 7.17 cm    Triscuspid Valve Regurgitation Peak Gradient 59 mmHg    LA Volume Index (Mod) 93.6 mL/m2    LA volume (mod) 174.14 cm3    RA Width 3.59 cm    Right Atrial Pressure (from IVC) 3 mmHg    EF 48 %    TV rest pulmonary artery pressure 62 mmHg    Narrative    · The left ventricle is normal in size with mild concentric hypertrophy   and mildly decreased systolic function.  · The estimated ejection fraction is 48%.  · There are segmental left ventricular wall motion abnormalities.  · There is abnormal septal wall motion.  · Grade III left ventricular diastolic dysfunction.  · Severe left atrial enlargement.  · Normal right ventricular size with normal right ventricular systolic   function.  · Mild right atrial enlargement.  · There is a mechanical aortic valve present.  · The  aortic valve mean gradient is 17 mmHg with a dimensionless index of   0.38.  · Moderate to moderate -severe ( 2-3+) MR  · Moderate tricuspid regurgitation.  · Normal central venous pressure (3 mmHg).  · The estimated PA systolic pressure is 62 mmHg.  · There is at least moderate pulmonary hypertension.

## 2023-03-22 NOTE — NURSING
INR elevated. Dr Camacho notified. Procedure cancelled. Patient discharged per MD orders. Instructions given on medications, activity, when to call MD, and follow up appointments. Pt verbalized understanding.  Patient AAOx3, VSS, no c/o pain or discomfort at this time. Telemetry and PIV removed. Patient declined wheelchair transport, ambulated off unit in stable condition for discharge.

## 2023-03-22 NOTE — DISCHARGE SUMMARY
[facility] - Short Stay Cardiac Unit  Cardiology  Discharge Summary      Patient Name: Dariel Urbina  MRN: [id number]  Admission Date: 3/22/2023  Hospital Length of Stay: 0 days  Discharge Date and Time:  03/22/2023 9:50 AM  Attending Physician: Devon Garnica MD    Discharging Provider: Anthony Camacho MD  Primary Care Physician: Devon Langston MD    HPI:   Patient is an 82 year old male with history of CAD s/p CABG in 1990s, Mechanical AV on Warfarin, Mod-severe MR, CKD Stage IV that presents for KIRSTEN for mitral clip workup     TTE 12/2022: mod severe MR, mod TR, LVEF 48%, severe LAE, mechanical AV with DI 0.38 and mean grad 18 mm Hg    Dysphagia or odynophagia:  No  Liver Disease, esophageal disease, or known varices:  No  Upper GI Bleeding: No  Prior neck surgery or radiation:  No  History of anesthetic difficulties:  No  Family history of anesthetic difficulties:  No  Able to move neck in all directions:  Yes  Snoring:  Yes  Sleep Apnea:  No  Last oral intake:  12 hours ago        Procedure(s) (LRB):  ECHOCARDIOGRAM, TRANSESOPHAGEAL (N/A)     Indwelling Lines/Drains at time of discharge:  Lines/Drains/Airways     None                 Hospital Course:  INR was 4.63, discussed with patient about risks and benefits of procedure, through shared decision making, patient will be rescheduled for KIRSTEN at another time with repeat INR. Patient was advised to follow up with Dr Garnica nurse to reschedule.       Goals of Care Treatment Preferences:  Code Status: Full Code      Consults:     Significant Diagnostic Studies: Labs:   BMP: No results for input(s): GLU, NA, K, CL, CO2, BUN, CREATININE, CALCIUM, MG in the last 48 hours., CMP No results for input(s): NA, K, CL, CO2, GLU, BUN, CREATININE, CALCIUM, PROT, ALBUMIN, BILITOT, ALKPHOS, AST, ALT, ANIONGAP, ESTGFRAFRICA, EGFRNONAA in the last 48 hours., CBC No results for input(s): WBC, HGB, HCT, PLT in the last 48 hours. and INR   Lab Results   Component Value Date     INR 4.6 (H) 03/22/2023    INR 2.0 (H) 03/16/2023    INR 1.4 (H) 03/13/2023       Pending Diagnostic Studies:     Procedure Component Value Units Date/Time    Transesophageal echo (KIRSTEN) [[id number]]     Order Status: Sent Lab Status: No result           Final Active Diagnoses:    Diagnosis Date Noted POA    PRINCIPAL PROBLEM:  Mitral insufficiency [I34.0] 03/22/2023 Yes    H/O mechanical aortic valve replacement [Z95.2] 09/17/2014 Not Applicable      Problems Resolved During this Admission:     Cardiac/Vascular  * Mitral insufficiency  Patient is an 82 year old male with history of CAD s/p CABG in 1990s, Mechanical AV on Warfarin, Mod-severe MR, CKD Stage IV that presents for KIRSTEN for mitral clip workup     TTE 12/2022: mod severe MR, mod TR, LVEF 48%, severe LAE, mechanical AV with DI 0.38 and mean grad 18 mm Hg    1. KIRSTEN for evaluation of mitral valve  INR was 4.63 for mechanical AV valve anticoagulation , discussed with patient about risks and benefits of procedure, through shared decision making, patient will be rescheduled        Discharged Condition: stable    Disposition: Home or Self Care    Follow Up:   Follow-up Information     Devon Garnica MD Follow up in 1 week(s).    Specialties: Cardiology, Interventional Cardiology  Why: reschedule KIRSTEN  Contact information:  [address]  [facility] [address] [phone]                       Patient Instructions:   No discharge procedures on file.  Medications:  Reconciled Home Medications:      Medication List      CONTINUE taking these medications    albuterol 90 mcg/actuation inhaler  Commonly known as: VENTOLIN HFA  Inhale 2 puffs into the lungs every 6 (six) hours as needed for Wheezing. Rescue     allopurinoL 100 MG tablet  Commonly known as: ZYLOPRIM  TAKE 1 TABLET EVERY DAY     bumetanide 1 MG tablet  Commonly known as: BUMEX  Take 1 tablet (1 mg total) by mouth once daily.     ferrous gluconate 324 MG tablet  Commonly known as: FERGON  Take 1  tablet (324 mg total) by mouth daily with breakfast.     fish oil-omega-3 fatty acids 300-1,000 mg capsule  Take 1 capsule by mouth once daily.     isosorbide mononitrate 60 MG 24 hr tablet  Commonly known as: IMDUR  Take 60 mg by mouth once daily.     lisinopriL 2.5 MG tablet  Commonly known as: PRINIVIL,ZESTRIL  Take 2.5 mg by mouth once daily.     loperamide 2 mg capsule  Commonly known as: IMODIUM  Take 2 mg by mouth 4 (four) times daily as needed for Diarrhea.     metoprolol succinate 25 MG 24 hr tablet  Commonly known as: TOPROL-XL  Take 1 tablet (25 mg total) by mouth once daily.     NASAL DECONGESTANT (OXYMETAZL) 0.05 % nasal spray  Generic drug: oxymetazoline  Use 2 sprays by Nasal route daily as needed (nose bleed).     rosuvastatin 40 MG Tab  Commonly known as: CRESTOR  TAKE 1 TABLET EVERY EVENING.     warfarin 5 MG tablet  Commonly known as: COUMADIN  warfarin 7.5 (1.5 tablets) sun and Thursday, and 5mg other days        STOP taking these medications    enoxaparin 80 mg/0.8 mL Syrg  Commonly known as: LOVENOX     glimepiride 1 MG tablet  Commonly known as: AMARYL     midazolam 1 mg/mL injection  Commonly known as: VERSED        ASK your doctor about these medications    tramadol-acetaminophen 37.5-325 mg 37.5-325 mg Tab  Commonly known as: ULTRACET  1 tablet by Per J Tube route every 12 (twelve) hours as needed for Pain (pain).            Time spent on the discharge of patient: 35 minutes    Anthony Camacho MD  Cardiology PGY5  [facility] - Short Stay Cardiac Unit

## 2023-03-22 NOTE — HOSPITAL COURSE
INR was 4.63, discussed with patient about risks and benefits of procedure, through shared decision making, patient will be rescheduled for KIRSTEN at another time with repeat INR. Patient was advised to follow up with Dr Garnica nurse to reschedule.

## 2023-03-23 ENCOUNTER — PATIENT MESSAGE (OUTPATIENT)
Dept: CARDIOLOGY | Facility: CLINIC | Age: 83
End: 2023-03-23
Payer: MEDICARE

## 2023-03-24 ENCOUNTER — PATIENT MESSAGE (OUTPATIENT)
Dept: OTHER | Facility: OTHER | Age: 83
End: 2023-03-24
Payer: MEDICARE

## 2023-03-27 PROBLEM — I21.4 NSTEMI (NON-ST ELEVATED MYOCARDIAL INFARCTION): Status: RESOLVED | Noted: 2018-03-21 | Resolved: 2023-03-27

## 2023-03-29 ENCOUNTER — ANTI-COAG VISIT (OUTPATIENT)
Dept: CARDIOLOGY | Facility: CLINIC | Age: 83
End: 2023-03-29
Payer: MEDICARE

## 2023-03-29 ENCOUNTER — LAB VISIT (OUTPATIENT)
Dept: LAB | Facility: HOSPITAL | Age: 83
End: 2023-03-29
Payer: MEDICARE

## 2023-03-29 DIAGNOSIS — Z79.01 ANTICOAGULANT LONG-TERM USE: Primary | ICD-10-CM

## 2023-03-29 DIAGNOSIS — Z95.2 H/O MECHANICAL AORTIC VALVE REPLACEMENT: ICD-10-CM

## 2023-03-29 DIAGNOSIS — Z79.01 LONG TERM CURRENT USE OF ANTICOAGULANT THERAPY: ICD-10-CM

## 2023-03-29 LAB
INR PPP: 3.8 (ref 0.8–1.2)
PROTHROMBIN TIME: 36.6 SEC (ref 9–12.5)

## 2023-03-29 PROCEDURE — 85610 PROTHROMBIN TIME: CPT | Mod: HCNC | Performed by: INTERNAL MEDICINE

## 2023-03-29 PROCEDURE — 93793 PR ANTICOAGULANT MGMT FOR PT TAKING WARFARIN: ICD-10-PCS | Mod: S$GLB,,,

## 2023-03-29 PROCEDURE — 93793 ANTICOAG MGMT PT WARFARIN: CPT | Mod: S$GLB,,,

## 2023-03-29 PROCEDURE — 36415 COLL VENOUS BLD VENIPUNCTURE: CPT | Mod: HCNC | Performed by: INTERNAL MEDICINE

## 2023-04-05 ENCOUNTER — LAB VISIT (OUTPATIENT)
Dept: LAB | Facility: HOSPITAL | Age: 83
End: 2023-04-05
Payer: MEDICARE

## 2023-04-05 ENCOUNTER — ANTI-COAG VISIT (OUTPATIENT)
Dept: CARDIOLOGY | Facility: CLINIC | Age: 83
End: 2023-04-05
Payer: MEDICARE

## 2023-04-05 DIAGNOSIS — Z79.01 ANTICOAGULANT LONG-TERM USE: Primary | ICD-10-CM

## 2023-04-05 DIAGNOSIS — Z95.2 H/O MECHANICAL AORTIC VALVE REPLACEMENT: ICD-10-CM

## 2023-04-05 DIAGNOSIS — Z79.01 LONG TERM CURRENT USE OF ANTICOAGULANT THERAPY: ICD-10-CM

## 2023-04-05 LAB
INR PPP: 2.6 (ref 0.8–1.2)
PROTHROMBIN TIME: 26.2 SEC (ref 9–12.5)

## 2023-04-05 PROCEDURE — 36415 COLL VENOUS BLD VENIPUNCTURE: CPT | Mod: HCNC | Performed by: INTERNAL MEDICINE

## 2023-04-05 PROCEDURE — 93793 ANTICOAG MGMT PT WARFARIN: CPT | Mod: S$GLB,,,

## 2023-04-05 PROCEDURE — 93793 PR ANTICOAGULANT MGMT FOR PT TAKING WARFARIN: ICD-10-PCS | Mod: S$GLB,,,

## 2023-04-05 PROCEDURE — 85610 PROTHROMBIN TIME: CPT | Mod: HCNC | Performed by: INTERNAL MEDICINE

## 2023-04-06 ENCOUNTER — PATIENT OUTREACH (OUTPATIENT)
Dept: OTHER | Facility: OTHER | Age: 83
End: 2023-04-06
Payer: MEDICARE

## 2023-04-06 ENCOUNTER — PATIENT MESSAGE (OUTPATIENT)
Dept: OTHER | Facility: OTHER | Age: 83
End: 2023-04-06
Payer: MEDICARE

## 2023-04-11 ENCOUNTER — HOSPITAL ENCOUNTER (OUTPATIENT)
Facility: HOSPITAL | Age: 83
Discharge: HOME | End: 2023-04-15
Attending: INTERNAL MEDICINE | Admitting: INTERNAL MEDICINE
Payer: MEDICARE

## 2023-04-11 DIAGNOSIS — I34.0 MITRAL VALVE INSUFFICIENCY, UNSPECIFIED ETIOLOGY: Primary | ICD-10-CM

## 2023-04-11 DIAGNOSIS — N18.32 STAGE 3B CHRONIC KIDNEY DISEASE: ICD-10-CM

## 2023-04-11 DIAGNOSIS — R79.1 SUPRATHERAPEUTIC INR: ICD-10-CM

## 2023-04-11 DIAGNOSIS — R07.9 CHEST PAIN: ICD-10-CM

## 2023-04-11 DIAGNOSIS — N17.9 AKI (ACUTE KIDNEY INJURY): ICD-10-CM

## 2023-04-11 DIAGNOSIS — I34.0 MITRAL REGURGITATION: ICD-10-CM

## 2023-04-11 LAB
ALBUMIN SERPL BCP-MCNC: 3.5 G/DL (ref 3.5–5.2)
ALP SERPL-CCNC: 58 U/L (ref 55–135)
ALT SERPL W/O P-5'-P-CCNC: 12 U/L (ref 10–44)
ANION GAP SERPL CALC-SCNC: 11 MMOL/L (ref 8–16)
ANION GAP SERPL CALC-SCNC: 11 MMOL/L (ref 8–16)
AST SERPL-CCNC: 20 U/L (ref 10–40)
BASOPHILS # BLD AUTO: 0.03 K/UL (ref 0–0.2)
BASOPHILS NFR BLD: 0.7 % (ref 0–1.9)
BILIRUB SERPL-MCNC: 0.4 MG/DL (ref 0.1–1)
BUN SERPL-MCNC: 63 MG/DL (ref 8–23)
BUN SERPL-MCNC: 63 MG/DL (ref 8–23)
CALCIUM SERPL-MCNC: 10.2 MG/DL (ref 8.7–10.5)
CALCIUM SERPL-MCNC: 9.8 MG/DL (ref 8.7–10.5)
CHLORIDE SERPL-SCNC: 108 MMOL/L (ref 95–110)
CHLORIDE SERPL-SCNC: 108 MMOL/L (ref 95–110)
CO2 SERPL-SCNC: 18 MMOL/L (ref 23–29)
CO2 SERPL-SCNC: 18 MMOL/L (ref 23–29)
CREAT SERPL-MCNC: 3.4 MG/DL (ref 0.5–1.4)
CREAT SERPL-MCNC: 3.5 MG/DL (ref 0.5–1.4)
DIFFERENTIAL METHOD: ABNORMAL
EOSINOPHIL # BLD AUTO: 0.2 K/UL (ref 0–0.5)
EOSINOPHIL NFR BLD: 3.4 % (ref 0–8)
ERYTHROCYTE [DISTWIDTH] IN BLOOD BY AUTOMATED COUNT: 13.8 % (ref 11.5–14.5)
EST. GFR  (NO RACE VARIABLE): 16.8 ML/MIN/1.73 M^2
EST. GFR  (NO RACE VARIABLE): 17.4 ML/MIN/1.73 M^2
GLUCOSE SERPL-MCNC: 152 MG/DL (ref 70–110)
GLUCOSE SERPL-MCNC: 154 MG/DL (ref 70–110)
HCT VFR BLD AUTO: 29.7 % (ref 40–54)
HGB BLD-MCNC: 9.5 G/DL (ref 14–18)
IMM GRANULOCYTES # BLD AUTO: 0.01 K/UL (ref 0–0.04)
IMM GRANULOCYTES NFR BLD AUTO: 0.2 % (ref 0–0.5)
INR PPP: 4.9 (ref 0.8–1.2)
LYMPHOCYTES # BLD AUTO: 1.3 K/UL (ref 1–4.8)
LYMPHOCYTES NFR BLD: 29.5 % (ref 18–48)
MAGNESIUM SERPL-MCNC: 1.8 MG/DL (ref 1.6–2.6)
MCH RBC QN AUTO: 30.7 PG (ref 27–31)
MCHC RBC AUTO-ENTMCNC: 32 G/DL (ref 32–36)
MCV RBC AUTO: 96 FL (ref 82–98)
MONOCYTES # BLD AUTO: 0.5 K/UL (ref 0.3–1)
MONOCYTES NFR BLD: 11.1 % (ref 4–15)
NEUTROPHILS # BLD AUTO: 2.4 K/UL (ref 1.8–7.7)
NEUTROPHILS NFR BLD: 55.1 % (ref 38–73)
NRBC BLD-RTO: 0 /100 WBC
PHOSPHATE SERPL-MCNC: 4.2 MG/DL (ref 2.7–4.5)
PLATELET # BLD AUTO: 158 K/UL (ref 150–450)
PMV BLD AUTO: 10.6 FL (ref 9.2–12.9)
POCT GLUCOSE: 105 MG/DL (ref 70–110)
POTASSIUM SERPL-SCNC: 4.6 MMOL/L (ref 3.5–5.1)
POTASSIUM SERPL-SCNC: 4.8 MMOL/L (ref 3.5–5.1)
PROT SERPL-MCNC: 7.1 G/DL (ref 6–8.4)
PROTHROMBIN TIME: 46.6 SEC (ref 9–12.5)
RBC # BLD AUTO: 3.09 M/UL (ref 4.6–6.2)
SODIUM SERPL-SCNC: 137 MMOL/L (ref 136–145)
SODIUM SERPL-SCNC: 137 MMOL/L (ref 136–145)
WBC # BLD AUTO: 4.41 K/UL (ref 3.9–12.7)

## 2023-04-11 PROCEDURE — 93010 EKG 12-LEAD: ICD-10-PCS | Mod: HCNC,,, | Performed by: INTERNAL MEDICINE

## 2023-04-11 PROCEDURE — 93005 ELECTROCARDIOGRAM TRACING: CPT | Mod: HCNC

## 2023-04-11 PROCEDURE — 85025 COMPLETE CBC W/AUTO DIFF WBC: CPT | Mod: HCNC | Performed by: STUDENT IN AN ORGANIZED HEALTH CARE EDUCATION/TRAINING PROGRAM

## 2023-04-11 PROCEDURE — G0378 HOSPITAL OBSERVATION PER HR: HCPCS | Mod: HCNC

## 2023-04-11 PROCEDURE — 93010 ELECTROCARDIOGRAM REPORT: CPT | Mod: HCNC,,, | Performed by: INTERNAL MEDICINE

## 2023-04-11 PROCEDURE — 80048 BASIC METABOLIC PNL TOTAL CA: CPT | Mod: HCNC,XB | Performed by: STUDENT IN AN ORGANIZED HEALTH CARE EDUCATION/TRAINING PROGRAM

## 2023-04-11 PROCEDURE — 80053 COMPREHEN METABOLIC PANEL: CPT | Mod: HCNC | Performed by: STUDENT IN AN ORGANIZED HEALTH CARE EDUCATION/TRAINING PROGRAM

## 2023-04-11 PROCEDURE — 82962 GLUCOSE BLOOD TEST: CPT | Mod: HCNC

## 2023-04-11 PROCEDURE — G0379 DIRECT REFER HOSPITAL OBSERV: HCPCS | Mod: HCNC

## 2023-04-11 PROCEDURE — 25000003 PHARM REV CODE 250: Mod: HCNC | Performed by: STUDENT IN AN ORGANIZED HEALTH CARE EDUCATION/TRAINING PROGRAM

## 2023-04-11 PROCEDURE — 84100 ASSAY OF PHOSPHORUS: CPT | Mod: HCNC | Performed by: STUDENT IN AN ORGANIZED HEALTH CARE EDUCATION/TRAINING PROGRAM

## 2023-04-11 PROCEDURE — 83735 ASSAY OF MAGNESIUM: CPT | Mod: HCNC | Performed by: STUDENT IN AN ORGANIZED HEALTH CARE EDUCATION/TRAINING PROGRAM

## 2023-04-11 PROCEDURE — 36415 COLL VENOUS BLD VENIPUNCTURE: CPT | Mod: HCNC | Performed by: STUDENT IN AN ORGANIZED HEALTH CARE EDUCATION/TRAINING PROGRAM

## 2023-04-11 PROCEDURE — 85610 PROTHROMBIN TIME: CPT | Mod: HCNC | Performed by: HOSPITALIST

## 2023-04-11 PROCEDURE — 20600001 HC STEP DOWN PRIVATE ROOM: Mod: HCNC

## 2023-04-11 RX ORDER — ALBUTEROL SULFATE 90 UG/1
2 AEROSOL, METERED RESPIRATORY (INHALATION) EVERY 6 HOURS PRN
Status: DISCONTINUED | OUTPATIENT
Start: 2023-04-11 | End: 2023-04-15 | Stop reason: HOSPADM

## 2023-04-11 RX ORDER — FERROUS GLUCONATE 324(37.5)
324 TABLET ORAL
Status: DISCONTINUED | OUTPATIENT
Start: 2023-04-12 | End: 2023-04-15 | Stop reason: HOSPADM

## 2023-04-11 RX ORDER — DEXTROSE 40 %
30 GEL (GRAM) ORAL
Status: DISCONTINUED | OUTPATIENT
Start: 2023-04-11 | End: 2023-04-15 | Stop reason: HOSPADM

## 2023-04-11 RX ORDER — METOPROLOL SUCCINATE 25 MG/1
25 TABLET, EXTENDED RELEASE ORAL DAILY
Status: DISCONTINUED | OUTPATIENT
Start: 2023-04-11 | End: 2023-04-15 | Stop reason: HOSPADM

## 2023-04-11 RX ORDER — NALOXONE HCL 0.4 MG/ML
0.02 VIAL (ML) INJECTION
Status: DISCONTINUED | OUTPATIENT
Start: 2023-04-11 | End: 2023-04-15 | Stop reason: HOSPADM

## 2023-04-11 RX ORDER — ISOSORBIDE MONONITRATE 60 MG/1
60 TABLET, EXTENDED RELEASE ORAL DAILY
Status: DISCONTINUED | OUTPATIENT
Start: 2023-04-11 | End: 2023-04-15 | Stop reason: HOSPADM

## 2023-04-11 RX ORDER — ATORVASTATIN CALCIUM 40 MG/1
80 TABLET, FILM COATED ORAL DAILY
Status: DISCONTINUED | OUTPATIENT
Start: 2023-04-11 | End: 2023-04-15 | Stop reason: HOSPADM

## 2023-04-11 RX ORDER — SODIUM CHLORIDE 0.9 % (FLUSH) 0.9 %
10 SYRINGE (ML) INJECTION EVERY 12 HOURS PRN
Status: DISCONTINUED | OUTPATIENT
Start: 2023-04-11 | End: 2023-04-15 | Stop reason: HOSPADM

## 2023-04-11 RX ORDER — GLUCAGON 1 MG
1 KIT INJECTION
Status: DISCONTINUED | OUTPATIENT
Start: 2023-04-11 | End: 2023-04-15 | Stop reason: HOSPADM

## 2023-04-11 RX ORDER — DEXTROSE 40 %
15 GEL (GRAM) ORAL
Status: DISCONTINUED | OUTPATIENT
Start: 2023-04-11 | End: 2023-04-15 | Stop reason: HOSPADM

## 2023-04-11 RX ORDER — BUMETANIDE 1 MG/1
1 TABLET ORAL DAILY
Status: DISCONTINUED | OUTPATIENT
Start: 2023-04-11 | End: 2023-04-14

## 2023-04-11 RX ORDER — LISINOPRIL 2.5 MG/1
2.5 TABLET ORAL DAILY
Status: DISCONTINUED | OUTPATIENT
Start: 2023-04-12 | End: 2023-04-12

## 2023-04-11 RX ORDER — SODIUM CHLORIDE 0.9 % (FLUSH) 0.9 %
3 SYRINGE (ML) INJECTION
Status: DISCONTINUED | OUTPATIENT
Start: 2023-04-11 | End: 2023-04-15 | Stop reason: HOSPADM

## 2023-04-11 RX ORDER — HYDROMORPHONE HYDROCHLORIDE 1 MG/ML
0.2 INJECTION, SOLUTION INTRAMUSCULAR; INTRAVENOUS; SUBCUTANEOUS EVERY 5 MIN PRN
Status: DISCONTINUED | OUTPATIENT
Start: 2023-04-11 | End: 2023-04-13

## 2023-04-11 RX ADMIN — METOPROLOL SUCCINATE 25 MG: 25 TABLET, EXTENDED RELEASE ORAL at 03:04

## 2023-04-11 RX ADMIN — ISOSORBIDE MONONITRATE 60 MG: 60 TABLET, EXTENDED RELEASE ORAL at 03:04

## 2023-04-11 RX ADMIN — ATORVASTATIN CALCIUM 80 MG: 40 TABLET, FILM COATED ORAL at 03:04

## 2023-04-11 RX ADMIN — ALLOPURINOL 100 MG: 300 TABLET ORAL at 03:04

## 2023-04-11 RX ADMIN — BUMETANIDE 1 MG: 1 TABLET ORAL at 03:04

## 2023-04-11 NOTE — NURSING TRANSFER
Nursing Transfer Note      4/11/2023     Reason patient is being transferred: hospital admission    Transfer To: [id number]    Transfer via wheelchair    Transported by PCT    Medicines sent: n/a    Chart send with patient: Yes    Notified: spouse at bedside    Upon arrival to floor: cardiac monitor applied, patient oriented to room, call bell in reach, and bed in lowest position. Report given to RADHA Thorpe.

## 2023-04-11 NOTE — PLAN OF CARE
CCU Acceptance Note    Briefly, Mr Urbina is a 82 yoM with history of CAD (s/p CABG in 1990s), mAV on warfarin, mod-severe MR, CKD4 who presented for outpt KIRSTEN evaluation of suspected worsening of mitral insufficiency.  Upon presentation, INR elevated to 4.9 and KIRSTEN aborted.  Patient hemodynamically stable, satting well on RA.     Plan:  - patient to be admitted to CCU for INR monitoring  - CBC, INR daily  - hold warfarin  - NPO at MN if KIRSTEN able to be done tomorrow if INR allows  - monitor on tele    Alessio Wray MD  Internal Medicine PGY3  CCU service

## 2023-04-11 NOTE — HPI
82 y.o. male with past medical history of:   -CAD s/p CABG in 1990s  -Mechanical AV on Warfarin  -Mod-severe MR  -CKD Stage IV     Who presents as a referral from Dr. Devine for MitraClip evaluation. Patient states that over the past month he has been having intermittent chest pain while walking. Pain does not radiate and is relievedby rest. PET stress positive in RCA/Lcx territory. He has also experienced lower extremity edema and shortness of breath. He was seen by  who referred him for MitraClip. Denies palpitations and claudication. Patient also reports R arm pain due to recent fall.     Anticoagulant/antiplatelets:Coumadin   ECG:    Dysphagia or odynophagia:  No  Liver Disease, esophageal disease, or known varices:  No  Upper GI Bleeding: No  Snoring:  No  Sleep Apnea:  No  Prior neck surgery or radiation:  No  History of anesthetic difficulties:  No  Family history of anesthetic difficulties:  No  Last oral intake: yesterday before midnight  Able to move neck in all directions:  No  Platelet count:   INR:

## 2023-04-11 NOTE — H&P
[facility] - Short Stay Cardiac Unit  Cardiology  History and Physical     Patient Name: Dariel Urbina  MRN: [id number]  Admission Date: 4/11/2023  Code Status: Prior   Attending Provider: Devon Garnica MD   Primary Care Physician: Devon Langston MD  Principal Problem:<principal problem not specified>    Patient information was obtained from ER records.     Subjective:     Chief Complaint:  Presents for MR      HPI:  82 y.o. male with past medical history of:   -CAD s/p CABG in 1990s  -Mechanical AV on Warfarin  -Mod-severe MR  -CKD Stage IV     Who presents as a referral from Dr. Devine for MitraClip evaluation. Patient states that over the past month he has been having intermittent chest pain while walking. Pain does not radiate and is relievedby rest. PET stress positive in RCA/Lcx territory. He has also experienced lower extremity edema and shortness of breath. He was seen by  who referred him for MitraClip. Denies palpitations and claudication. Patient also reports R arm pain due to recent fall.     Anticoagulant/antiplatelets:Coumadin   ECG:    Dysphagia or odynophagia:  No  Liver Disease, esophageal disease, or known varices:  No  Upper GI Bleeding: No  Snoring:  No  Sleep Apnea:  No  Prior neck surgery or radiation:  No  History of anesthetic difficulties:  No  Family history of anesthetic difficulties:  No  Last oral intake: yesterday before midnight  Able to move neck in all directions:  No  Platelet count:   INR:       2D Echo      The left ventricle is normal in size with mild concentric hypertrophy and mildly decreased systolic function.   The estimated ejection fraction is 48%.   There are segmental left ventricular wall motion abnormalities.   There is abnormal septal wall motion.   Grade III left ventricular diastolic dysfunction.   Severe left atrial enlargement.   Normal right ventricular size with normal right ventricular systolic function.   Mild right atrial  enlargement.   There is a mechanical aortic valve present.   The aortic valve mean gradient is 17 mmHg with a dimensionless index of 0.38.   Moderate to moderate -severe ( 2-3+) MR   Moderate tricuspid regurgitation.   Normal central venous pressure (3 mmHg).   The estimated PA systolic pressure is 62 mmHg.   There is at least moderate pulmonary hypertension.         Past Medical History:   Diagnosis Date    Anemia of chronic renal failure, stage 3 (moderate) 05/27/2015    Anticoagulant long-term use     Atherosclerosis of coronary artery bypass graft of native heart without angina pectoris 09/11/2012    3-27-18 [facility] Two vessel coronary artery disease.   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    Bilateral carotid artery disease 02/09/2017    Bleeding from the nose     Bleeding nose 03/21/2018    Cancer     Cataract     CHF (congestive heart failure)     CKD (chronic kidney disease) stage 3, GFR 30-59 ml/min 05/27/2015    Claudication of left lower extremity 09/17/2014    Colon polyp     Encounter for blood transfusion     Gastroesophageal reflux disease without esophagitis 03/19/2018    Gastrointestinal hemorrhage associated with intestinal diverticulosis 04/01/2018    Glaucoma     H/O mechanical aortic valve replacement 09/17/2014    History of gout 09/26/2012    Hyperparathyroidism due to renal insufficiency 07/27/2015    Internal hemorrhoid 04/03/2018    Long term current use of anticoagulant therapy 09/26/2012    Mechanical heart valve present     Metabolic acidosis with normal anion gap and bicarbonate losses 03/20/2018    Mixed hyperlipidemia 09/26/2012    NSTEMI (non-ST elevated myocardial infarction) 03/21/2018    Obesity, diabetes, and hypertension syndrome 02/23/2016    Paroxysmal atrial fibrillation 02/06/2023    PVD (peripheral vascular disease) 09/11/2012    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    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  02/01/2023    vertex scalp    Syncope 09/29/2022    Type 2 diabetes mellitus with diabetic peripheral angiopathy without gangrene 05/27/2015    Type 2 diabetes mellitus with stage 3 chronic kidney disease, without long-term current use of insulin 10/02/2013       Past Surgical History:   Procedure Laterality Date    BACK SURGERY      CARDIAC CATHETERIZATION      CARDIAC VALVE REPLACEMENT      CARDIAC VALVE SURGERY      CARPAL TUNNEL RELEASE Right 05/19/2020    Procedure: RELEASE, CARPAL TUNNEL;  Surgeon: Rupesh Norris Jr., MD;  Location: [facility];  Service: Plastics;  Laterality: Right;    CATARACT EXTRACTION Left 11/13/2022        COLON SURGERY      COLONOSCOPY N/A 03/31/2017    Procedure: COLONOSCOPY;  Surgeon: Bruno Raymond MD;  Location: [facility] (4TH FLR);  Service: Endoscopy;  Laterality: N/A;  Patient's wife requesting date.    COLONOSCOPY N/A 04/03/2018    Procedure: COLONOSCOPY;  Surgeon: Bonifacio Pelletier MD;  [facility] (2ND F[facility]);  Service: Endoscopy;  Laterality: N/A;    COLONOSCOPY N/A 08/13/2018    Procedure: COLONOSCOPY;  Surgeon: Kam Barba MD;  Location: [facility] (2ND FLR);  Service: Endoscopy;  Laterality: N/A;  2nd floor: PA pressure 49; hx of moderate-severe valve disease     per Coumadin clinic-Patient can hold 5 days with lovenox bridge       ok to schedule per Katarina    CORONARY ANGIOGRAPHY N/A 10/04/2021    Procedure: Left heart cath +/- peripheral angiogram;  Surgeon: Jose Ruiz MD;  Location: [facility] C[facility];  Service: Cardiology;  Laterality: N/A;    CORONARY ANGIOGRAPHY N/A 3/2/2023    Procedure: Angiogram, Coronary;  Surgeon: Devon Garnica MD;  Location: [facility] C[facility] LAB;  Service: Cardiology;  Laterality: N/A;    CORONARY ANGIOPLASTY      CORONARY ARTERY BYPASS GRAFT      CORONARY BYPASS GRAFT ANGIOGRAPHY  10/04/2021    Procedure: Bypass graft study;  Surgeon: Jose Ruiz MD;  Location: [facility];  Service: Cardiology;;     CORONARY BYPASS GRAFT ANGIOGRAPHY  3/2/2023    Procedure: Bypass graft study;  Surgeon: Devon Garnica MD;  Location: [facility] CATH LAB;  Service: Cardiology;;    EYE SURGERY      HERNIA REPAIR      INTRAOCULAR PROSTHESES INSERTION Left 11/13/2022    Procedure: INSERTION, IOL PROSTHESIS;  Surgeon: Alia Mckeon MD;  [facility];  Service: Ophthalmology;  Laterality: Left;    INTRAOCULAR PROSTHESES INSERTION Right 12/04/2022    Procedure: INSERTION, IOL PROSTHESIS;  Surgeon: Alia Mckeon MD;  Location: [facility] OR [address] [place] [address];  Service: Ophthalmology;  Laterality: Right;    PHACOEMULSIFICATION OF CATARACT Left 11/13/2022    Procedure: PHACOEMULSIFICATION, CATARACT;  Surgeon: Alia Mckeon MD;  Location: [facility] OR [address] [place] [address];  Service: Ophthalmology;  Laterality: Left;    PHACOEMULSIFICATION OF CATARACT Right 12/04/2022    Procedure: PHACOEMULSIFICATION, CATARACT;  Surgeon: Alia Mckeon MD;  Location: [address];  Service: Ophthalmology;  Laterality: Right;    SPINE SURGERY      TRANSESOPHAGEAL ECHOCARDIOGRAPHY N/A 3/22/2023    Procedure: ECHOCARDIOGRAM, TRANSESOPHAGEAL;  Surgeon: Zenia Diagnostic Provider;  Location: [facility] EP LAB;  Service: Anesthesiology;  Laterality: N/A;    VASECTOMY         Review of patient's allergies indicates:   Allergen Reactions    Fosinopril      Intolerance- elevates potassium level      Losartan      Intolerance- elevates potassium level       Current Facility-Administered Medications on File Prior to Encounter   Medication    0.9%  NaCl infusion    ondansetron injection 4 mg    ondansetron injection 4 mg    phenylephrine HCL 2.5% ophthalmic solution 1 drop    phenylephrine HCL 2.5% ophthalmic solution 1 drop    proparacaine 0.5 % ophthalmic solution 1 drop    proparacaine 0.5 % ophthalmic solution 1 drop    tropicamide 1% ophthalmic solution 1 drop    tropicamide 1% ophthalmic solution 1 drop     Current Outpatient Medications on File Prior to  Encounter   Medication Sig    albuterol (VENTOLIN HFA) 90 mcg/actuation inhaler Inhale 2 puffs into the lungs every 6 (six) hours as needed for Wheezing. Rescue    allopurinoL (ZYLOPRIM) 100 MG tablet TAKE 1 TABLET EVERY DAY    bumetanide (BUMEX) 1 MG tablet Take 1 tablet (1 mg total) by mouth once daily.    ferrous gluconate (FERGON) 324 MG tablet Take 1 tablet (324 mg total) by mouth daily with breakfast.    isosorbide mononitrate (IMDUR) 60 MG 24 hr tablet Take 60 mg by mouth once daily.    lisinopriL (PRINIVIL,ZESTRIL) 2.5 MG tablet Take 2.5 mg by mouth once daily.    loperamide (IMODIUM) 2 mg capsule Take 2 mg by mouth 4 (four) times daily as needed for Diarrhea.    metoprolol succinate (TOPROL-XL) 25 MG 24 hr tablet Take 1 tablet (25 mg total) by mouth once daily.    omega-3 fatty acids/fish oil (FISH OIL-OMEGA-3 FATTY ACIDS) 300-1,000 mg capsule Take 1 capsule by mouth once daily.    oxymetazoline (AFRIN) 0.05 % nasal spray Use 2 sprays by Nasal route daily as needed (nose bleed).    rosuvastatin (CRESTOR) 40 MG Tab TAKE 1 TABLET EVERY EVENING.    tramadol-acetaminophen 37.5-325 mg (ULTRACET) 37.5-325 mg Tab 1 tablet by Per J Tube route every 12 (twelve) hours as needed for Pain (pain). (Patient not taking: Reported on 3/22/2023)    warfarin (COUMADIN) 5 MG tablet warfarin 7.5 (1.5 tablets) sun and Thursday, and 5mg other days     Family History       Problem Relation (Age of Onset)    Alcohol abuse Father    Diabetes Brother    Heart disease Mother, Father, Brother, Sister    Heart failure Mother, Father, Brother    No Known Problems Sister, Maternal Grandmother, Maternal Grandfather, Paternal Grandmother, Paternal Grandfather, Maternal Aunt, Maternal Uncle, Paternal Aunt, Paternal Uncle          Tobacco Use    Smoking status: Former     Packs/day: 1.00     Years: 20.00     Pack years: 20.00     Types: Cigarettes     Quit date: 1980     Years since quittin.6     Passive exposure:  Never    Smokeless tobacco: Never   Substance and Sexual Activity    Alcohol use: No    Drug use: No    Sexual activity: Not Currently     Partners: Female     Review of Systems   Constitutional: Negative.   HENT: Negative.     Eyes: Negative.    Cardiovascular: Negative.    Respiratory: Negative.     Endocrine: Negative.    Skin: Negative.    Musculoskeletal: Negative.    Gastrointestinal: Negative.    Genitourinary: Negative.    Psychiatric/Behavioral: Negative.     Objective:     Vital Signs (Most Recent):    Vital Signs (24h Range):           There is no height or weight on file to calculate BMI.            No intake or output data in the 24 hours ending 04/11/23 0743    Lines/Drains/Airways       None                   Physical Exam  Constitutional:       Appearance: Normal appearance.   HENT:      Head: Normocephalic and atraumatic.      Mouth/Throat:      Mouth: Mucous membranes are moist.   Cardiovascular:      Rate and Rhythm: Normal rate and regular rhythm.   Musculoskeletal:         General: Normal range of motion.      Cervical back: Normal range of motion.   Skin:     General: Skin is warm.      Capillary Refill: Capillary refill takes less than 2 seconds.   Neurological:      General: No focal deficit present.      Mental Status: He is alert and oriented to person, place, and time.       Significant Labs: All pertinent lab results from the last 24 hours have been reviewed.        Assessment and Plan:     Mitral insufficiency  Here today for KIRSTEN for Mitral regurgitation Eval   -No absolute contraindications of esophageal stricture, tumor, perforation, laceration,or diverticulum and/or active GI bleed  -The risks, benefits & alternatives of the procedure were explained to the patient.   -The risks of transesophageal echo include but are not limited to:  Dental trauma, esophageal trauma/perforation, bleeding, laryngospasm/brochospasm, aspiration, sore throat/hoarseness, & dislodgement of the  endotracheal tube/nasogastric tube (where applicable).    -The risks of ANES monitored sedation include hypotension, respiratory depression, arrhythmias, bronchospasm, & death.    -Informed consent was obtained. The patient is agreeable to proceed with the procedure and all questions and concerns addressed.    Case discussed with an attending in echocardiography lab.    Further recommendations per attending addendum          VTE Risk Mitigation (From admission, onward)    None          Bebeto Tafoya MD  Cardiology   [facility] - Short Stay Cardiac Unit

## 2023-04-11 NOTE — SUBJECTIVE & OBJECTIVE
Past Medical History:   Diagnosis Date    Anemia of chronic renal failure, stage 3 (moderate) 05/27/2015    Anticoagulant long-term use     Atherosclerosis of coronary artery bypass graft of native heart without angina pectoris 09/11/2012    3-27-18 [facility] Two vessel coronary artery disease.   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    Bilateral carotid artery disease 02/09/2017    Bleeding from the nose     Bleeding nose 03/21/2018    Cancer     Cataract     CHF (congestive heart failure)     CKD (chronic kidney disease) stage 3, GFR 30-59 ml/min 05/27/2015    Claudication of left lower extremity 09/17/2014    Colon polyp     Encounter for blood transfusion     Gastroesophageal reflux disease without esophagitis 03/19/2018    Gastrointestinal hemorrhage associated with intestinal diverticulosis 04/01/2018    Glaucoma     H/O mechanical aortic valve replacement 09/17/2014    History of gout 09/26/2012    Hyperparathyroidism due to renal insufficiency 07/27/2015    Internal hemorrhoid 04/03/2018    Long term current use of anticoagulant therapy 09/26/2012    Mechanical heart valve present     Metabolic acidosis with normal anion gap and bicarbonate losses 03/20/2018    Mixed hyperlipidemia 09/26/2012    NSTEMI (non-ST elevated myocardial infarction) 03/21/2018    Obesity, diabetes, and hypertension syndrome 02/23/2016    Paroxysmal atrial fibrillation 02/06/2023    PVD (peripheral vascular disease) 09/11/2012    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       Past Surgical History:   Procedure Laterality Date    BACK SURGERY      CARDIAC CATHETERIZATION      CARDIAC VALVE REPLACEMENT      CARDIAC VALVE  SURGERY      CARPAL TUNNEL RELEASE Right 05/19/2020    Procedure: RELEASE, CARPAL TUNNEL;  Surgeon: Rupesh Norris Jr., MD;  Location: [facility];  Service: Plastics;  Laterality: Right;    CATARACT EXTRACTION Left 11/13/2022        COLON SURGERY      COLONOSCOPY N/A 03/31/2017    Procedure: COLONOSCOPY;  Surgeon: Bruno Raymond MD;  Location: [facility] ENDO (4TH FLR);  Service: Endoscopy;  Laterality: N/A;  Patient's wife requesting date.    COLONOSCOPY N/A 04/03/2018    Procedure: COLONOSCOPY;  Surgeon: Bonifacio Pelletier MD;  Location: [facility] ENDO (2ND FLR);  Service: Endoscopy;  Laterality: N/A;    COLONOSCOPY N/A 08/13/2018    Procedure: COLONOSCOPY;  Surgeon: Kam Barba MD;  Location: [facility] ENDO (2ND FLR);  Service: Endoscopy;  Laterality: N/A;  2nd floor: PA pressure 49; hx of moderate-severe valve disease     per Coumadin clinic-Patient can hold 5 days with lovenox bridge       ok to schedule per Katarina    CORONARY ANGIOGRAPHY N/A 10/04/2021    Procedure: Left heart cath +/- peripheral angiogram;  Surgeon: Jose Ruiz MD;  Location: [facility] CATH LAB;  Service: Cardiology;  Laterality: N/A;    CORONARY ANGIOGRAPHY N/A 3/2/2023    Procedure: Angiogram, Coronary;  Surgeon: Devon Garnica MD;  Location: [facility] CATH LAB;  Service: Cardiology;  Laterality: N/A;    CORONARY ANGIOPLASTY      CORONARY ARTERY BYPASS GRAFT      CORONARY BYPASS GRAFT ANGIOGRAPHY  10/04/2021    Procedure: Bypass graft study;  Surgeon: Jose Ruiz MD;  Location: [facility] CATH LAB;  Service: Cardiology;;    CORONARY BYPASS GRAFT ANGIOGRAPHY  3/2/2023    Procedure: Bypass graft study;  Surgeon: Devon Garnica MD;  Location: [facility] CATH LAB;  Service: Cardiology;;    EYE SURGERY      HERNIA REPAIR      INTRAOCULAR PROSTHESES INSERTION Left 11/13/2022    Procedure: INSERTION, IOL PROSTHESIS;  Surgeon: Alia Mckeon MD;  Location: [facility] 1ST FLR;  Service: Ophthalmology;  Laterality: Left;    INTRAOCULAR PROSTHESES INSERTION  Right 12/04/2022    Procedure: INSERTION, IOL PROSTHESIS;  Surgeon: Alia Mckeon MD;  Location: [facility] OR [address] [place] [address];  Service: Ophthalmology;  Laterality: Right;    PHACOEMULSIFICATION OF CATARACT Left 11/13/2022    Procedure: PHACOEMULSIFICATION, CATARACT;  Surgeon: Alia Mckeon MD;  [facility];  Service: Ophthalmology;  Laterality: Left;    PHACOEMULSIFICATION OF CATARACT Right 12/04/2022    Procedure: PHACOEMULSIFICATION, CATARACT;  Surgeon: Alia Mckeon MD;  Location: [facility] OR [address] [place] [address];  Service: Ophthalmology;  Laterality: Right;    SPINE SURGERY      TRANSESOPHAGEAL ECHOCARDIOGRAPHY N/A 3/22/2023    Procedure: ECHOCARDIOGRAM, TRANSESOPHAGEAL;  Surgeon: Zenia Diagnostic Provider;  Location: [facility] EP LAB;  Service: Anesthesiology;  Laterality: N/A;    VASECTOMY         Review of patient's allergies indicates:   Allergen Reactions    Fosinopril      Intolerance- elevates potassium level      Losartan      Intolerance- elevates potassium level       Current Facility-Administered Medications on File Prior to Encounter   Medication    0.9%  NaCl infusion    ondansetron injection 4 mg    ondansetron injection 4 mg    phenylephrine HCL 2.5% ophthalmic solution 1 drop    phenylephrine HCL 2.5% ophthalmic solution 1 drop    proparacaine 0.5 % ophthalmic solution 1 drop    proparacaine 0.5 % ophthalmic solution 1 drop    tropicamide 1% ophthalmic solution 1 drop    tropicamide 1% ophthalmic solution 1 drop     Current Outpatient Medications on File Prior to Encounter   Medication Sig    albuterol (VENTOLIN HFA) 90 mcg/actuation inhaler Inhale 2 puffs into the lungs every 6 (six) hours as needed for Wheezing. Rescue    allopurinoL (ZYLOPRIM) 100 MG tablet TAKE 1 TABLET EVERY DAY    bumetanide (BUMEX) 1 MG tablet Take 1 tablet (1 mg total) by mouth once daily.    ferrous gluconate (FERGON) 324 MG tablet Take 1 tablet (324 mg total) by mouth daily with breakfast.    isosorbide mononitrate (IMDUR) 60 MG  24 hr tablet Take 60 mg by mouth once daily.    lisinopriL (PRINIVIL,ZESTRIL) 2.5 MG tablet Take 2.5 mg by mouth once daily.    loperamide (IMODIUM) 2 mg capsule Take 2 mg by mouth 4 (four) times daily as needed for Diarrhea.    metoprolol succinate (TOPROL-XL) 25 MG 24 hr tablet Take 1 tablet (25 mg total) by mouth once daily.    omega-3 fatty acids/fish oil (FISH OIL-OMEGA-3 FATTY ACIDS) 300-1,000 mg capsule Take 1 capsule by mouth once daily.    oxymetazoline (AFRIN) 0.05 % nasal spray Use 2 sprays by Nasal route daily as needed (nose bleed).    rosuvastatin (CRESTOR) 40 MG Tab TAKE 1 TABLET EVERY EVENING.    tramadol-acetaminophen 37.5-325 mg (ULTRACET) 37.5-325 mg Tab 1 tablet by Per J Tube route every 12 (twelve) hours as needed for Pain (pain). (Patient not taking: Reported on 3/22/2023)    warfarin (COUMADIN) 5 MG tablet warfarin 7.5 (1.5 tablets) sun and Thursday, and 5mg other days     Family History       Problem Relation (Age of Onset)    Alcohol abuse Father    Diabetes Brother    Heart disease Mother, Father, Brother, Sister    Heart failure Mother, Father, Brother    No Known Problems Sister, Maternal Grandmother, Maternal Grandfather, Paternal Grandmother, Paternal Grandfather, Maternal Aunt, Maternal Uncle, Paternal Aunt, Paternal Uncle          Tobacco Use    Smoking status: Former     Packs/day: 1.00     Years: 20.00     Pack years: 20.00     Types: Cigarettes     Quit date: 1980     Years since quittin.6     Passive exposure: Never    Smokeless tobacco: Never   Substance and Sexual Activity    Alcohol use: No    Drug use: No    Sexual activity: Not Currently     Partners: Female     Review of Systems   Constitutional: Negative.   HENT: Negative.     Eyes: Negative.    Cardiovascular: Negative.    Respiratory: Negative.     Endocrine: Negative.    Skin: Negative.    Musculoskeletal: Negative.    Gastrointestinal: Negative.    Genitourinary: Negative.    Psychiatric/Behavioral: Negative.      Objective:     Vital Signs (Most Recent):    Vital Signs (24h Range):           There is no height or weight on file to calculate BMI.            No intake or output data in the 24 hours ending 04/11/23 0743    Lines/Drains/Airways       None                   Physical Exam  Constitutional:       Appearance: Normal appearance.   HENT:      Head: Normocephalic and atraumatic.      Mouth/Throat:      Mouth: Mucous membranes are moist.   Cardiovascular:      Rate and Rhythm: Normal rate and regular rhythm.   Musculoskeletal:         General: Normal range of motion.      Cervical back: Normal range of motion.   Skin:     General: Skin is warm.      Capillary Refill: Capillary refill takes less than 2 seconds.   Neurological:      General: No focal deficit present.      Mental Status: He is alert and oriented to person, place, and time.       Significant Labs: All pertinent lab results from the last 24 hours have been reviewed.

## 2023-04-11 NOTE — ASSESSMENT & PLAN NOTE
Here today for KIRSTEN for Mitral regurgitation Eval   -No absolute contraindications of esophageal stricture, tumor, perforation, laceration,or diverticulum and/or active GI bleed  -The risks, benefits & alternatives of the procedure were explained to the patient.   -The risks of transesophageal echo include but are not limited to:  Dental trauma, esophageal trauma/perforation, bleeding, laryngospasm/brochospasm, aspiration, sore throat/hoarseness, & dislodgement of the endotracheal tube/nasogastric tube (where applicable).    -The risks of ANES monitored sedation include hypotension, respiratory depression, arrhythmias, bronchospasm, & death.    -Informed consent was obtained. The patient is agreeable to proceed with the procedure and all questions and concerns addressed.    Case discussed with an attending in echocardiography lab.    Further recommendations per attending addendum

## 2023-04-12 PROBLEM — N17.9 ACUTE KIDNEY INJURY SUPERIMPOSED ON CKD: Status: RESOLVED | Noted: 2022-12-21 | Resolved: 2023-04-12

## 2023-04-12 PROBLEM — N18.9 ACUTE KIDNEY INJURY SUPERIMPOSED ON CKD: Status: RESOLVED | Noted: 2022-12-21 | Resolved: 2023-04-12

## 2023-04-12 PROBLEM — N18.9 ACUTE KIDNEY INJURY SUPERIMPOSED ON CKD: Status: ACTIVE | Noted: 2022-12-21

## 2023-04-12 LAB
ALBUMIN SERPL BCP-MCNC: 3.4 G/DL (ref 3.5–5.2)
ALP SERPL-CCNC: 57 U/L (ref 55–135)
ALT SERPL W/O P-5'-P-CCNC: 12 U/L (ref 10–44)
ANION GAP SERPL CALC-SCNC: 11 MMOL/L (ref 8–16)
AST SERPL-CCNC: 19 U/L (ref 10–40)
BACTERIA #/AREA URNS AUTO: NORMAL /HPF
BASOPHILS # BLD AUTO: 0.04 K/UL (ref 0–0.2)
BASOPHILS NFR BLD: 0.9 % (ref 0–1.9)
BILIRUB SERPL-MCNC: 0.4 MG/DL (ref 0.1–1)
BILIRUB UR QL STRIP: NEGATIVE
BNP SERPL-MCNC: 354 PG/ML (ref 0–99)
BUN SERPL-MCNC: 65 MG/DL (ref 8–23)
CALCIUM SERPL-MCNC: 10 MG/DL (ref 8.7–10.5)
CHLORIDE SERPL-SCNC: 110 MMOL/L (ref 95–110)
CLARITY UR REFRACT.AUTO: CLEAR
CO2 SERPL-SCNC: 18 MMOL/L (ref 23–29)
COLOR UR AUTO: YELLOW
CREAT SERPL-MCNC: 3.2 MG/DL (ref 0.5–1.4)
CREAT UR-MCNC: 82 MG/DL (ref 23–375)
DIFFERENTIAL METHOD: ABNORMAL
EOSINOPHIL # BLD AUTO: 0.2 K/UL (ref 0–0.5)
EOSINOPHIL NFR BLD: 3.7 % (ref 0–8)
ERYTHROCYTE [DISTWIDTH] IN BLOOD BY AUTOMATED COUNT: 13.7 % (ref 11.5–14.5)
EST. GFR  (NO RACE VARIABLE): 18.7 ML/MIN/1.73 M^2
GLUCOSE SERPL-MCNC: 70 MG/DL (ref 70–110)
GLUCOSE UR QL STRIP: NEGATIVE
HCT VFR BLD AUTO: 29.3 % (ref 40–54)
HGB BLD-MCNC: 9.3 G/DL (ref 14–18)
HGB UR QL STRIP: ABNORMAL
HYALINE CASTS UR QL AUTO: 0 /LPF
IMM GRANULOCYTES # BLD AUTO: 0.02 K/UL (ref 0–0.04)
IMM GRANULOCYTES NFR BLD AUTO: 0.5 % (ref 0–0.5)
INR PPP: 4.6 (ref 0.8–1.2)
KETONES UR QL STRIP: NEGATIVE
LEUKOCYTE ESTERASE UR QL STRIP: NEGATIVE
LYMPHOCYTES # BLD AUTO: 1.4 K/UL (ref 1–4.8)
LYMPHOCYTES NFR BLD: 32.6 % (ref 18–48)
MAGNESIUM SERPL-MCNC: 1.8 MG/DL (ref 1.6–2.6)
MCH RBC QN AUTO: 30.6 PG (ref 27–31)
MCHC RBC AUTO-ENTMCNC: 31.7 G/DL (ref 32–36)
MCV RBC AUTO: 96 FL (ref 82–98)
MICROSCOPIC COMMENT: NORMAL
MONOCYTES # BLD AUTO: 0.5 K/UL (ref 0.3–1)
MONOCYTES NFR BLD: 11.8 % (ref 4–15)
NEUTROPHILS # BLD AUTO: 2.2 K/UL (ref 1.8–7.7)
NEUTROPHILS NFR BLD: 50.5 % (ref 38–73)
NITRITE UR QL STRIP: NEGATIVE
NRBC BLD-RTO: 0 /100 WBC
PH UR STRIP: 6 [PH] (ref 5–8)
PHOSPHATE SERPL-MCNC: 4.7 MG/DL (ref 2.7–4.5)
PLATELET # BLD AUTO: 150 K/UL (ref 150–450)
PMV BLD AUTO: 10.6 FL (ref 9.2–12.9)
POTASSIUM SERPL-SCNC: 4.4 MMOL/L (ref 3.5–5.1)
PROT SERPL-MCNC: 6.9 G/DL (ref 6–8.4)
PROT UR QL STRIP: ABNORMAL
PROTHROMBIN TIME: 44.4 SEC (ref 9–12.5)
RBC # BLD AUTO: 3.04 M/UL (ref 4.6–6.2)
RBC #/AREA URNS AUTO: 1 /HPF (ref 0–4)
SODIUM SERPL-SCNC: 139 MMOL/L (ref 136–145)
SODIUM UR-SCNC: 62 MMOL/L (ref 20–250)
SP GR UR STRIP: 1.01 (ref 1–1.03)
SQUAMOUS #/AREA URNS AUTO: 0 /HPF
URN SPEC COLLECT METH UR: ABNORMAL
UUN UR-MCNC: 569 MG/DL (ref 140–1050)
WBC # BLD AUTO: 4.33 K/UL (ref 3.9–12.7)
WBC #/AREA URNS AUTO: 3 /HPF (ref 0–5)

## 2023-04-12 PROCEDURE — 85025 COMPLETE CBC W/AUTO DIFF WBC: CPT | Mod: HCNC | Performed by: STUDENT IN AN ORGANIZED HEALTH CARE EDUCATION/TRAINING PROGRAM

## 2023-04-12 PROCEDURE — 82570 ASSAY OF URINE CREATININE: CPT | Mod: HCNC | Performed by: STUDENT IN AN ORGANIZED HEALTH CARE EDUCATION/TRAINING PROGRAM

## 2023-04-12 PROCEDURE — G0378 HOSPITAL OBSERVATION PER HR: HCPCS | Mod: HCNC

## 2023-04-12 PROCEDURE — 83735 ASSAY OF MAGNESIUM: CPT | Mod: HCNC | Performed by: STUDENT IN AN ORGANIZED HEALTH CARE EDUCATION/TRAINING PROGRAM

## 2023-04-12 PROCEDURE — 99231 PR SUBSEQUENT HOSPITAL CARE,LEVL I: ICD-10-PCS | Mod: HCNC,,, | Performed by: INTERNAL MEDICINE

## 2023-04-12 PROCEDURE — 81001 URINALYSIS AUTO W/SCOPE: CPT | Mod: HCNC | Performed by: STUDENT IN AN ORGANIZED HEALTH CARE EDUCATION/TRAINING PROGRAM

## 2023-04-12 PROCEDURE — 85610 PROTHROMBIN TIME: CPT | Mod: HCNC | Performed by: STUDENT IN AN ORGANIZED HEALTH CARE EDUCATION/TRAINING PROGRAM

## 2023-04-12 PROCEDURE — 84100 ASSAY OF PHOSPHORUS: CPT | Mod: HCNC | Performed by: STUDENT IN AN ORGANIZED HEALTH CARE EDUCATION/TRAINING PROGRAM

## 2023-04-12 PROCEDURE — 84540 ASSAY OF URINE/UREA-N: CPT | Mod: HCNC | Performed by: STUDENT IN AN ORGANIZED HEALTH CARE EDUCATION/TRAINING PROGRAM

## 2023-04-12 PROCEDURE — 36415 COLL VENOUS BLD VENIPUNCTURE: CPT | Mod: HCNC | Performed by: STUDENT IN AN ORGANIZED HEALTH CARE EDUCATION/TRAINING PROGRAM

## 2023-04-12 PROCEDURE — 99231 SBSQ HOSP IP/OBS SF/LOW 25: CPT | Mod: HCNC,,, | Performed by: INTERNAL MEDICINE

## 2023-04-12 PROCEDURE — 25000003 PHARM REV CODE 250: Mod: HCNC | Performed by: STUDENT IN AN ORGANIZED HEALTH CARE EDUCATION/TRAINING PROGRAM

## 2023-04-12 PROCEDURE — 80053 COMPREHEN METABOLIC PANEL: CPT | Mod: HCNC | Performed by: STUDENT IN AN ORGANIZED HEALTH CARE EDUCATION/TRAINING PROGRAM

## 2023-04-12 PROCEDURE — 83880 ASSAY OF NATRIURETIC PEPTIDE: CPT | Mod: HCNC | Performed by: STUDENT IN AN ORGANIZED HEALTH CARE EDUCATION/TRAINING PROGRAM

## 2023-04-12 PROCEDURE — 84300 ASSAY OF URINE SODIUM: CPT | Mod: HCNC | Performed by: STUDENT IN AN ORGANIZED HEALTH CARE EDUCATION/TRAINING PROGRAM

## 2023-04-12 PROCEDURE — 63600175 PHARM REV CODE 636 W HCPCS: Mod: HCNC | Performed by: STUDENT IN AN ORGANIZED HEALTH CARE EDUCATION/TRAINING PROGRAM

## 2023-04-12 RX ORDER — LISINOPRIL 2.5 MG/1
2.5 TABLET ORAL DAILY
Start: 2023-04-12

## 2023-04-12 RX ORDER — ATORVASTATIN CALCIUM 80 MG/1
80 TABLET, FILM COATED ORAL DAILY
Qty: 90 TABLET | Refills: 3 | OUTPATIENT
Start: 2023-04-12 | End: 2024-04-11

## 2023-04-12 RX ADMIN — ISOSORBIDE MONONITRATE 60 MG: 60 TABLET, EXTENDED RELEASE ORAL at 09:04

## 2023-04-12 RX ADMIN — Medication 324 MG: at 09:04

## 2023-04-12 RX ADMIN — ATORVASTATIN CALCIUM 80 MG: 40 TABLET, FILM COATED ORAL at 09:04

## 2023-04-12 RX ADMIN — METOPROLOL SUCCINATE 25 MG: 25 TABLET, EXTENDED RELEASE ORAL at 09:04

## 2023-04-12 RX ADMIN — BUMETANIDE 1 MG: 1 TABLET ORAL at 09:04

## 2023-04-12 RX ADMIN — ALLOPURINOL 100 MG: 300 TABLET ORAL at 09:04

## 2023-04-12 RX ADMIN — PHYTONADIONE 1 MG: 10 INJECTION, EMULSION INTRAMUSCULAR; INTRAVENOUS; SUBCUTANEOUS at 12:04

## 2023-04-12 NOTE — ASSESSMENT & PLAN NOTE
Lab Results   Component Value Date    TROPONINI 0.164 (H) 12/29/2022     (H) 04/12/2023     Results for orders placed during the hospital encounter of 12/29/22  Echo  Interpretation Summary  · The left ventricle is normal in size with mild concentric hypertrophy and mildly decreased systolic function.  · The estimated ejection fraction is 48%.  · There are segmental left ventricular wall motion abnormalities.  · There is abnormal septal wall motion.  · Grade III left ventricular diastolic dysfunction.  · Severe left atrial enlargement.  · Normal right ventricular size with normal right ventricular systolic function.  · Mild right atrial enlargement.  · There is a mechanical aortic valve present.  · The aortic valve mean gradient is 17 mmHg with a dimensionless index of 0.38.  · Moderate to moderate -severe ( 2-3+) MR  · Moderate tricuspid regurgitation.  · Normal central venous pressure (3 mmHg).  · The estimated PA systolic pressure is 62 mmHg.  · There is at least moderate pulmonary hypertension.  - appears compensated on exam  - continue home bumex   - hold ACE for JIM  - Cardiac diet, fluid restriction at 1500 cc with strict I/Os and daily standing weights  - Maintain Mg >2, K >4  - pending KIRSTEN once INR stabilizes

## 2023-04-12 NOTE — SUBJECTIVE & OBJECTIVE
Interval History: naeon, JIM and INR improving, though still subtherapeutic.      Review of Systems   Constitutional: Negative.   HENT: Negative.     Eyes: Negative.    Cardiovascular:  Positive for chest pain.   Respiratory:  Positive for shortness of breath.    Endocrine: Negative.    Skin: Negative.    Musculoskeletal: Negative.    Gastrointestinal: Negative.    Genitourinary: Negative.    Psychiatric/Behavioral: Negative.     Objective:     Vital Signs (Most Recent):  Temp: 97.3 °F (36.3 °C) (04/12/23 1133)  Pulse: 74 (04/12/23 1133)  Resp: 20 (04/12/23 1133)  BP: (!) 142/62 (04/12/23 1133)  SpO2: (!) 91 % (04/12/23 1133)   Vital Signs (24h Range):  Temp:  [97.3 °F (36.3 °C)-98.3 °F (36.8 °C)] 97.3 °F (36.3 °C)  Pulse:  [50-79] 74  Resp:  [16-20] 20  SpO2:  [91 %-100 %] 91 %  BP: ()/(53-74) 142/62     Weight: 73.5 kg (162 lb)  Body mass index is 26.96 kg/m².     SpO2: (!) 91 %       No intake or output data in the 24 hours ending 04/12/23 1334    Lines/Drains/Airways       Peripheral Intravenous Line  Duration                  Peripheral IV - Single Lumen 04/11/23 0823 20 G Left Antecubital 1 day                    Physical Exam  Constitutional:       Appearance: Normal appearance.   HENT:      Head: Normocephalic and atraumatic.      Mouth/Throat:      Mouth: Mucous membranes are moist.   Cardiovascular:      Rate and Rhythm: Normal rate and regular rhythm.   Musculoskeletal:         General: Normal range of motion.      Cervical back: Normal range of motion.      Right lower leg: No edema.      Left lower leg: No edema.   Skin:     General: Skin is warm.      Capillary Refill: Capillary refill takes less than 2 seconds.   Neurological:      General: No focal deficit present.      Mental Status: He is alert and oriented to person, place, and time.       Significant Labs: BMP:   Recent Labs   Lab 04/11/23  1932 04/12/23  0441   *  152* 70     137 139   K 4.6  4.8 4.4     108 110    CO2 18*  18* 18*   BUN 63*  63* 65*   CREATININE 3.4*  3.5* 3.2*   CALCIUM 9.8  10.2 10.0   MG 1.8 1.8    and CMP   Recent Labs   Lab 04/11/23  1932 04/12/23  0441     137 139   K 4.6  4.8 4.4     108 110   CO2 18*  18* 18*   *  152* 70   BUN 63*  63* 65*   CREATININE 3.4*  3.5* 3.2*   CALCIUM 9.8  10.2 10.0   PROT 7.1 6.9   ALBUMIN 3.5 3.4*   BILITOT 0.4 0.4   ALKPHOS 58 57   AST 20 19   ALT 12 12   ANIONGAP 11  11 11       Significant Imaging:  reviewed

## 2023-04-12 NOTE — ASSESSMENT & PLAN NOTE
INR 4.9 on admission, with recent changes by coumadin clinic.  Previously therapeutic one week prior to admission.  - trend INR  - hold warfarin  - giving 1 dose vit k today

## 2023-04-12 NOTE — PROGRESS NOTES
[facility] - [facility] Step[facility]  Cardiology  Progress Note    Patient Name: Dariel Urbina  MRN: [id number]  Admission Date: 4/11/2023  Hospital Length of Stay: 1 days  Code Status: Full Code   Attending Physician: Erika Carmen MD   Primary Care Physician: Devon Langston MD  Expected Discharge Date: 4/14/2023  Principal Problem:Supratherapeutic INR    Subjective:     Hospital Course:   Admitted to CCU to monitor INR for KIRSTEN.  Vit K given.  Also with JIM, improving.      Interval History: naeon, JIM and INR improving, though still subtherapeutic.      Review of Systems   Constitutional: Negative.   HENT: Negative.     Eyes: Negative.    Cardiovascular:  Positive for chest pain.   Respiratory:  Positive for shortness of breath.    Endocrine: Negative.    Skin: Negative.    Musculoskeletal: Negative.    Gastrointestinal: Negative.    Genitourinary: Negative.    Psychiatric/Behavioral: Negative.     Objective:     Vital Signs (Most Recent):  Temp: 97.3 °F (36.3 °C) (04/12/23 1133)  Pulse: 74 (04/12/23 1133)  Resp: 20 (04/12/23 1133)  BP: (!) 142/62 (04/12/23 1133)  SpO2: (!) 91 % (04/12/23 1133)   Vital Signs (24h Range):  Temp:  [97.3 °F (36.3 °C)-98.3 °F (36.8 °C)] 97.3 °F (36.3 °C)  Pulse:  [50-79] 74  Resp:  [16-20] 20  SpO2:  [91 %-100 %] 91 %  BP: ()/(53-74) 142/62     Weight: 73.5 kg (162 lb)  Body mass index is 26.96 kg/m².     SpO2: (!) 91 %       No intake or output data in the 24 hours ending 04/12/23 1334    Lines/Drains/Airways       Peripheral Intravenous Line  Duration                  Peripheral IV - Single Lumen 04/11/23 0823 20 G Left Antecubital 1 day                    Physical Exam  Constitutional:       Appearance: Normal appearance.   HENT:      Head: Normocephalic and atraumatic.      Mouth/Throat:      Mouth: Mucous membranes are moist.   Cardiovascular:      Rate and Rhythm: Normal rate and regular rhythm.   Musculoskeletal:         General: Normal range of motion.      Cervical  back: Normal range of motion.      Right lower leg: No edema.      Left lower leg: No edema.   Skin:     General: Skin is warm.      Capillary Refill: Capillary refill takes less than 2 seconds.   Neurological:      General: No focal deficit present.      Mental Status: He is alert and oriented to person, place, and time.       Significant Labs: BMP:   Recent Labs   Lab 04/11/23  1932 04/12/23  0441   *  152* 70     137 139   K 4.6  4.8 4.4     108 110   CO2 18*  18* 18*   BUN 63*  63* 65*   CREATININE 3.4*  3.5* 3.2*   CALCIUM 9.8  10.2 10.0   MG 1.8 1.8    and CMP   Recent Labs   Lab 04/11/23 1932 04/12/23  0441     137 139   K 4.6  4.8 4.4     108 110   CO2 18*  18* 18*   *  152* 70   BUN 63*  63* 65*   CREATININE 3.4*  3.5* 3.2*   CALCIUM 9.8  10.2 10.0   PROT 7.1 6.9   ALBUMIN 3.5 3.4*   BILITOT 0.4 0.4   ALKPHOS 58 57   AST 20 19   ALT 12 12   ANIONGAP 11  11 11       Significant Imaging:  reviewed    Assessment and Plan:     * Supratherapeutic INR  INR 4.9 on admission, with recent changes by coumadin clinic.  Previously therapeutic one week prior to admission.  - trend INR  - hold warfarin  - giving 1 dose vit k today    Mitral insufficiency  Presented for outpt KIRSTEN for mitralclip eval, however INR supratherapeutic (4.9) on presentation.  - will hold warfarin, trend INR until therapeutic  - keep on tele  - tentatively plan for KIRSTEN while inpatient later this week if INR allows      Chronic combined systolic and diastolic heart failure    Lab Results   Component Value Date    TROPONINI 0.164 (H) 12/29/2022     (H) 04/12/2023     Results for orders placed during the hospital encounter of 12/29/22  Echo  Interpretation Summary  · The left ventricle is normal in size with mild concentric hypertrophy and mildly decreased systolic function.  · The estimated ejection fraction is 48%.  · There are segmental left ventricular wall motion abnormalities.  ·  There is abnormal septal wall motion.  · Grade III left ventricular diastolic dysfunction.  · Severe left atrial enlargement.  · Normal right ventricular size with normal right ventricular systolic function.  · Mild right atrial enlargement.  · There is a mechanical aortic valve present.  · The aortic valve mean gradient is 17 mmHg with a dimensionless index of 0.38.  · Moderate to moderate -severe ( 2-3+) MR  · Moderate tricuspid regurgitation.  · Normal central venous pressure (3 mmHg).  · The estimated PA systolic pressure is 62 mmHg.  · There is at least moderate pulmonary hypertension.  - appears compensated on exam  - continue home bumex   - hold ACE for JIM  - Cardiac diet, fluid restriction at 1500 cc with strict I/Os and daily standing weights  - Maintain Mg >2, K >4  - pending KIRSTEN once INR stabilizes    Acute renal failure superimposed on stage 3 chronic kidney disease  Baseline Cr 1.8-2.2.    Lab Results   Component Value Date    BUN 65 (H) 04/12/2023    CREATININE 3.2 (H) 04/12/2023   - urine lytes, UA ordered  - hold home lisinopril  - strict I/O  - CMP qd, trend Cr  - renally dose all medications  - avoid nephrotoxic agents, NSAIDs, IV contrast, ACE/ARB        VTE Risk Mitigation (From admission, onward)         Ordered     Reason for No Pharmacological VTE Prophylaxis  Once        Question:  Reasons:  Answer:  Risk of Bleeding    04/11/23 1448     IP VTE HIGH RISK PATIENT  Once         04/11/23 1448     Place sequential compression device  Until discontinued         04/11/23 1448                Alessio Wray MD  Cardiology  [facility] - Card[facility] Step[facility]

## 2023-04-12 NOTE — PLAN OF CARE
[facility]  Initial Discharge Assessment       Primary Care Provider: Devon Langston MD    Admission Diagnosis: H/O mechanical aortic valve replacement [Z95.2]  Mitral valve insufficiency, unspecified etiology [I34.0]    Admission Date: 4/11/2023  Expected Discharge Date: 4/14/2023    Discharge Barriers Identified: None    Payor: [employer] MANAGED MEDICARE / Plan: [employer] MEDICARE HMO / Product Type: Capitation /     Extended Emergency Contact Information  Primary Emergency Contact: Ameena Urbina  Address: [address] [place] [address] [facility]  Home Phone: [phone]  Mobile Phone: [phone]  Relation: Spouse    Discharge Plan A: Home with family  Discharge Plan B: Home      [facility] - [facility] [address]  [address] [facility] [address]  Phone: [phone] Fax: [phone]    [facility] [place] [id number]  [facility] [address]  Phone: [phone] Fax: [phone]      Initial Assessment (most recent)       Adult Discharge Assessment - 04/12/23 1303          Discharge Assessment    Assessment Type Discharge Planning Assessment     Confirmed/corrected address, phone number and insurance Yes     Confirmed Demographics Correct on Facesheet     Source of Information patient;family     Communicated ASTER with patient/caregiver Date not available/Unable to determine     Reason For Admission H/O mechanical Aortic Valve     People in Home spouse     Facility Arrived From: Home     Do you expect to return to your current living situation? Yes     Do you have help at home or someone to help you manage your care at home? Yes     Who are your caregiver(s) and their phone number(s)? Ameena Urbina ( spouse) [phone]     Prior to hospitilization cognitive status: Alert/Oriented     Current cognitive status: Alert/Oriented     Mobility Management none     Equipment Currently Used at Home none     Readmission  within 30 days? Yes     Patient currently being followed by outpatient case management? No     Do you currently have service(s) that help you manage your care at home? No     Do you take prescription medications? Yes     Do you have prescription coverage? Yes     Coverage [employer] managed medicare     Do you have any problems affording any of your prescribed medications? No     Is the patient taking medications as prescribed? yes     Who is going to help you get home at discharge? Ameena Urbina ( spouse) [phone]     How do you get to doctors appointments? family or friend will provide     Are you on dialysis? No     Do you take coumadin? Yes     Who monitors your labs? [facility]     Discharge Plan A Home with family     Discharge Plan B Home     DME Needed Upon Discharge  none     Discharge Plan discussed with: Spouse/sig other;Patient     Name(s) and Number(s) Ameena Urbina ( spouse) [phone]     Discharge Barriers Identified None        Physical Activity    On average, how many days per week do you engage in moderate to strenuous exercise (like a brisk walk)? 0 days     On average, how many minutes do you engage in exercise at this level? 0 min        Financial Resource Strain    How hard is it for you to pay for the very basics like food, housing, medical care, and heating? Not hard at all        Housing Stability    In the last 12 months, was there a time when you were not able to pay the mortgage or rent on time? No     In the last 12 months, how many places have you lived? 1     In the last 12 months, was there a time when you did not have a steady place to sleep or slept in a shelter (including now)? No        Transportation Needs    In the past 12 months, has lack of transportation kept you from medical appointments or from getting medications? No     In the past 12 months, has lack of transportation kept you from meetings, work, or from getting things needed for daily living? No         Food Insecurity    Within the past 12 months, you worried that your food would run out before you got the money to buy more. Never true     Within the past 12 months, the food you bought just didn't last and you didn't have money to get more. Never true        Stress    Do you feel stress - tense, restless, nervous, or anxious, or unable to sleep at night because your mind is troubled all the time - these days? Not at all        Social Connections    In a typical week, how many times do you talk on the phone with family, friends, or neighbors? Once a week     How often do you get together with friends or relatives? More than three times a week     How often do you attend Congregation or Voodoo services? Never     Do you belong to any clubs or organizations such as Congregation groups, unions, fraternal or athletic groups, or school groups? No     How often do you attend meetings of the clubs or organizations you belong to? Never     Are you , , , , never , or living with a partner?         Alcohol Use    Q1: How often do you have a drink containing alcohol? Never     Q2: How many drinks containing alcohol do you have on a typical day when you are drinking? Patient does not drink     Q3: How often do you have six or more drinks on one occasion? Never        OTHER    Name(s) of People in Home Ameena Urbina ( spouse) [phone]                      CM met with patient and spouse at bedside and left contact number. Patient lives in a one story house with one step to port of entry. Verified PCP, insurance and pharmacy. Uses [facility] main [facility] Wants bedside delivery. Is on coumadin and uses [facility] coumadin clinic. Is not on dialysis. Plans on returning home. Wife will bring home upon discharge.     Rosa Lees RN    [phone]

## 2023-04-12 NOTE — ASSESSMENT & PLAN NOTE
Baseline Cr 1.8-2.2.    Lab Results   Component Value Date    BUN 65 (H) 04/12/2023    CREATININE 3.2 (H) 04/12/2023   - urine lytes, UA ordered  - hold home lisinopril  - strict I/O  - CMP qd, trend Cr  - renally dose all medications  - avoid nephrotoxic agents, NSAIDs, IV contrast, ACE/ARB

## 2023-04-12 NOTE — HOSPITAL COURSE
Admitted to CCU to monitor INR for KIRSTEN.  Vit K given.  Also with elevated Cr, somewhat improving, however unclear etiology, nephrology consulted.  Renal ultrasound showing nonobstructive bladder mass, urology consulted.  Unable to obtain recommened CT with contrast due to renal function.  INR subtherapeutic after vit K, started on bridging heparin drip.  KIRSTEN performed 4/14.

## 2023-04-12 NOTE — ASSESSMENT & PLAN NOTE
Presented for outpt KIRSTEN for mitralclip eval, however INR supratherapeutic (4.9) on presentation.  - will hold warfarin, trend INR until therapeutic  - keep on tele  - tentatively plan for KIRSTEN while inpatient later this week if INR allows

## 2023-04-13 ENCOUNTER — ANESTHESIA EVENT (OUTPATIENT)
Dept: MEDSURG UNIT | Facility: HOSPITAL | Age: 83
End: 2023-04-13
Payer: MEDICARE

## 2023-04-13 LAB
ALBUMIN SERPL BCP-MCNC: 3.4 G/DL (ref 3.5–5.2)
ALP SERPL-CCNC: 62 U/L (ref 55–135)
ALT SERPL W/O P-5'-P-CCNC: 12 U/L (ref 10–44)
ANION GAP SERPL CALC-SCNC: 11 MMOL/L (ref 8–16)
AST SERPL-CCNC: 18 U/L (ref 10–40)
BASOPHILS # BLD AUTO: 0.04 K/UL (ref 0–0.2)
BASOPHILS NFR BLD: 0.9 % (ref 0–1.9)
BILIRUB SERPL-MCNC: 0.4 MG/DL (ref 0.1–1)
BUN SERPL-MCNC: 69 MG/DL (ref 8–23)
CALCIUM SERPL-MCNC: 9.9 MG/DL (ref 8.7–10.5)
CHLORIDE SERPL-SCNC: 110 MMOL/L (ref 95–110)
CK SERPL-CCNC: 96 U/L (ref 20–200)
CO2 SERPL-SCNC: 18 MMOL/L (ref 23–29)
CREAT SERPL-MCNC: 3.2 MG/DL (ref 0.5–1.4)
CREAT UR-MCNC: 121 MG/DL (ref 23–375)
DIFFERENTIAL METHOD: ABNORMAL
EOSINOPHIL # BLD AUTO: 0.1 K/UL (ref 0–0.5)
EOSINOPHIL NFR BLD: 3.1 % (ref 0–8)
ERYTHROCYTE [DISTWIDTH] IN BLOOD BY AUTOMATED COUNT: 13.7 % (ref 11.5–14.5)
EST. GFR  (NO RACE VARIABLE): 18.7 ML/MIN/1.73 M^2
GLUCOSE SERPL-MCNC: 82 MG/DL (ref 70–110)
HCT VFR BLD AUTO: 28.7 % (ref 40–54)
HGB BLD-MCNC: 9.3 G/DL (ref 14–18)
IMM GRANULOCYTES # BLD AUTO: 0.01 K/UL (ref 0–0.04)
IMM GRANULOCYTES NFR BLD AUTO: 0.2 % (ref 0–0.5)
INR PPP: 2.4 (ref 0.8–1.2)
LYMPHOCYTES # BLD AUTO: 1.5 K/UL (ref 1–4.8)
LYMPHOCYTES NFR BLD: 35.2 % (ref 18–48)
MAGNESIUM SERPL-MCNC: 1.9 MG/DL (ref 1.6–2.6)
MCH RBC QN AUTO: 30.4 PG (ref 27–31)
MCHC RBC AUTO-ENTMCNC: 32.4 G/DL (ref 32–36)
MCV RBC AUTO: 94 FL (ref 82–98)
MONOCYTES # BLD AUTO: 0.5 K/UL (ref 0.3–1)
MONOCYTES NFR BLD: 12.2 % (ref 4–15)
NEUTROPHILS # BLD AUTO: 2.1 K/UL (ref 1.8–7.7)
NEUTROPHILS NFR BLD: 48.4 % (ref 38–73)
NRBC BLD-RTO: 0 /100 WBC
PHOSPHATE SERPL-MCNC: 4.6 MG/DL (ref 2.7–4.5)
PLATELET # BLD AUTO: 154 K/UL (ref 150–450)
PMV BLD AUTO: 10.6 FL (ref 9.2–12.9)
POTASSIUM SERPL-SCNC: 4.4 MMOL/L (ref 3.5–5.1)
PROT SERPL-MCNC: 7 G/DL (ref 6–8.4)
PROT UR-MCNC: 120 MG/DL (ref 0–15)
PROT/CREAT UR: 0.99 MG/G{CREAT} (ref 0–0.2)
PROTHROMBIN TIME: 23.6 SEC (ref 9–12.5)
RBC # BLD AUTO: 3.06 M/UL (ref 4.6–6.2)
SODIUM SERPL-SCNC: 139 MMOL/L (ref 136–145)
WBC # BLD AUTO: 4.26 K/UL (ref 3.9–12.7)

## 2023-04-13 PROCEDURE — 25000003 PHARM REV CODE 250: Mod: HCNC | Performed by: STUDENT IN AN ORGANIZED HEALTH CARE EDUCATION/TRAINING PROGRAM

## 2023-04-13 PROCEDURE — G0378 HOSPITAL OBSERVATION PER HR: HCPCS | Mod: HCNC

## 2023-04-13 PROCEDURE — 83735 ASSAY OF MAGNESIUM: CPT | Mod: HCNC | Performed by: STUDENT IN AN ORGANIZED HEALTH CARE EDUCATION/TRAINING PROGRAM

## 2023-04-13 PROCEDURE — 36415 COLL VENOUS BLD VENIPUNCTURE: CPT | Mod: HCNC | Performed by: STUDENT IN AN ORGANIZED HEALTH CARE EDUCATION/TRAINING PROGRAM

## 2023-04-13 PROCEDURE — 99222 PR INITIAL HOSPITAL CARE,LEVL II: ICD-10-PCS | Mod: HCNC,,, | Performed by: HOSPITALIST

## 2023-04-13 PROCEDURE — 85025 COMPLETE CBC W/AUTO DIFF WBC: CPT | Mod: HCNC | Performed by: STUDENT IN AN ORGANIZED HEALTH CARE EDUCATION/TRAINING PROGRAM

## 2023-04-13 PROCEDURE — 99231 SBSQ HOSP IP/OBS SF/LOW 25: CPT | Mod: HCNC,,, | Performed by: INTERNAL MEDICINE

## 2023-04-13 PROCEDURE — 84100 ASSAY OF PHOSPHORUS: CPT | Mod: HCNC | Performed by: STUDENT IN AN ORGANIZED HEALTH CARE EDUCATION/TRAINING PROGRAM

## 2023-04-13 PROCEDURE — 80053 COMPREHEN METABOLIC PANEL: CPT | Mod: HCNC | Performed by: STUDENT IN AN ORGANIZED HEALTH CARE EDUCATION/TRAINING PROGRAM

## 2023-04-13 PROCEDURE — 99231 PR SUBSEQUENT HOSPITAL CARE,LEVL I: ICD-10-PCS | Mod: HCNC,,, | Performed by: INTERNAL MEDICINE

## 2023-04-13 PROCEDURE — 99222 1ST HOSP IP/OBS MODERATE 55: CPT | Mod: HCNC,,, | Performed by: HOSPITALIST

## 2023-04-13 PROCEDURE — 85610 PROTHROMBIN TIME: CPT | Mod: HCNC | Performed by: STUDENT IN AN ORGANIZED HEALTH CARE EDUCATION/TRAINING PROGRAM

## 2023-04-13 PROCEDURE — 82550 ASSAY OF CK (CPK): CPT | Mod: HCNC

## 2023-04-13 PROCEDURE — 84156 ASSAY OF PROTEIN URINE: CPT | Mod: HCNC | Performed by: STUDENT IN AN ORGANIZED HEALTH CARE EDUCATION/TRAINING PROGRAM

## 2023-04-13 PROCEDURE — 36415 COLL VENOUS BLD VENIPUNCTURE: CPT | Mod: HCNC

## 2023-04-13 RX ORDER — WARFARIN 7.5 MG/1
7.5 TABLET ORAL
Status: DISCONTINUED | OUTPATIENT
Start: 2023-04-16 | End: 2023-04-15 | Stop reason: HOSPADM

## 2023-04-13 RX ORDER — WARFARIN SODIUM 5 MG/1
5 TABLET ORAL
Status: DISCONTINUED | OUTPATIENT
Start: 2023-04-14 | End: 2023-04-14

## 2023-04-13 RX ORDER — WARFARIN 7.5 MG/1
7.5 TABLET ORAL ONCE
Status: COMPLETED | OUTPATIENT
Start: 2023-04-13 | End: 2023-04-13

## 2023-04-13 RX ADMIN — ALLOPURINOL 100 MG: 300 TABLET ORAL at 08:04

## 2023-04-13 RX ADMIN — ISOSORBIDE MONONITRATE 60 MG: 60 TABLET, EXTENDED RELEASE ORAL at 08:04

## 2023-04-13 RX ADMIN — BUMETANIDE 1 MG: 1 TABLET ORAL at 08:04

## 2023-04-13 RX ADMIN — WARFARIN SODIUM 7.5 MG: 7.5 TABLET ORAL at 05:04

## 2023-04-13 RX ADMIN — METOPROLOL SUCCINATE 25 MG: 25 TABLET, EXTENDED RELEASE ORAL at 08:04

## 2023-04-13 RX ADMIN — ATORVASTATIN CALCIUM 80 MG: 40 TABLET, FILM COATED ORAL at 08:04

## 2023-04-13 RX ADMIN — Medication 324 MG: at 08:04

## 2023-04-13 NOTE — ASSESSMENT & PLAN NOTE
Baseline Cr 1.8-2.2.    Lab Results   Component Value Date    BUN 69 (H) 04/13/2023    CREATININE 3.2 (H) 04/13/2023   - nephro consulted  - renal ultrasound  - hold home lisinopril  - strict I/O  - CMP qd, trend Cr  - renally dose all medications  - avoid nephrotoxic agents, NSAIDs, IV contrast, ACE/ARB

## 2023-04-13 NOTE — SUBJECTIVE & OBJECTIVE
Interval History: naeon, INR 2.6 today, Cr stable.  NPO @ MN, KIRSTEN tomorrow.    Review of Systems   Constitutional: Negative.   HENT: Negative.     Eyes: Negative.    Cardiovascular:  Positive for chest pain.   Respiratory:  Positive for shortness of breath.    Endocrine: Negative.    Skin: Negative.    Musculoskeletal: Negative.    Gastrointestinal: Negative.    Genitourinary: Negative.    Psychiatric/Behavioral: Negative.     Objective:     Vital Signs (Most Recent):  Temp: 97.7 °F (36.5 °C) (04/13/23 1300)  Pulse: 65 (04/13/23 1300)  Resp: 16 (04/13/23 1300)  BP: (!) 130/59 (04/13/23 1300)  SpO2: 96 % (04/13/23 1300)   Vital Signs (24h Range):  Temp:  [97.4 °F (36.3 °C)-98 °F (36.7 °C)] 97.7 °F (36.5 °C)  Pulse:  [59-78] 65  Resp:  [16-18] 16  SpO2:  [93 %-96 %] 96 %  BP: (129-153)/(59-70) 130/59     Weight: 73.5 kg (162 lb)  Body mass index is 26.96 kg/m².     SpO2: 96 %         Intake/Output Summary (Last 24 hours) at 4/13/2023 1445  Last data filed at 4/13/2023 0800  Gross per 24 hour   Intake 480 ml   Output --   Net 480 ml       Lines/Drains/Airways       Peripheral Intravenous Line  Duration                  Peripheral IV - Single Lumen 04/11/23 0823 20 G Left Antecubital 2 days                    Physical Exam  Constitutional:       Appearance: Normal appearance.   HENT:      Head: Normocephalic and atraumatic.      Mouth/Throat:      Mouth: Mucous membranes are moist.   Cardiovascular:      Rate and Rhythm: Normal rate and regular rhythm.   Musculoskeletal:         General: Normal range of motion.      Cervical back: Normal range of motion.      Right lower leg: No edema.      Left lower leg: No edema.   Skin:     General: Skin is warm.      Capillary Refill: Capillary refill takes less than 2 seconds.   Neurological:      General: No focal deficit present.      Mental Status: He is alert and oriented to person, place, and time.       Significant Labs: CMP   Recent Labs   Lab 04/11/23  1932 04/12/23  0441  04/13/23  0408     137 139 139   K 4.6  4.8 4.4 4.4     108 110 110   CO2 18*  18* 18* 18*   *  152* 70 82   BUN 63*  63* 65* 69*   CREATININE 3.4*  3.5* 3.2* 3.2*   CALCIUM 9.8  10.2 10.0 9.9   PROT 7.1 6.9 7.0   ALBUMIN 3.5 3.4* 3.4*   BILITOT 0.4 0.4 0.4   ALKPHOS 58 57 62   AST 20 19 18   ALT 12 12 12   ANIONGAP 11  11 11 11    and CBC   Recent Labs   Lab 04/11/23  1932 04/12/23  0441 04/13/23  0408   WBC 4.41 4.33 4.26   HGB 9.5* 9.3* 9.3*   HCT 29.7* 29.3* 28.7*    150 154       Significant Imaging:  reviewed

## 2023-04-13 NOTE — ASSESSMENT & PLAN NOTE
INR 4.9 on admission, with recent changes by coumadin clinic.  Previously therapeutic one week prior to admission.  - trend INR  - restart warfarin

## 2023-04-13 NOTE — ANESTHESIA PREPROCEDURE EVALUATION
[facility]  Anesthesia Pre-Operative Evaluation         Patient Name: Dariel Urbina  YOB: 1940  MRN: [id number]    SUBJECTIVE:     Pre-operative evaluation for Procedure(s) (LRB):  Transesophageal echo (KIRSTEN) intra-procedure log documentation (N/A)     04/13/2023    Dariel Urbina is a 82 y.o. male w/ HTN, DM2, CKD, CAD, CHF, pAF, gout, and mitral insufficiency who presents for the above procedure.    **e-Consent wouldn't load. Paper consent completed with patient at bedside, witnessed by patient's RN, and placed in chart. Patient also has e-Consent in chart from previously scheduled KIRSTEN that was cancelled d/t high INR**    [facility] 3/2/23    The Ost LAD lesion was 50% stenosed.    The Ost Cx to Mid Cx lesion was 100% stenosed.    The Mid LAD lesion was 100% stenosed.    PET Stress 1/17/23    There is a very small (< 5%) sized, moderate intensity basal to distal inferior and inferolateral resting perfusion abnormality involving 3% of LV myocardium in the distribution of the LCX and RCA. After pharmacologic stress, this perfusion abnormality is larger and more severe involving 16% of the LV myocardium, indicative of ischemia with underlying scar.    Within the perfusion abnormality, absolute myocardial perfusion (cc/min/gm) averaged 0.61 cc/min/g at rest, 0.58 cc/min/g at stress and CFR was 0.95 , which equates to severely reduced coronary flow capacity within the LCX and RCA territory.    There are no other significant perfusion abnormalities.    The whole heart absolute myocardial perfusion values averaged 0.89 cc/min/g at rest, which is normal; 1.10 cc/min/g at stress, which is mildly reduced; and CFR is 1.23 , which is severely reduced.    CT attenuation images demonstrate severe diffuse coronary calcifications in the LAD, LCX, moderate calcification in the RCA territory and severe diffuse aortic calcifications in the ascending aorta and descending aorta.    The gated  perfusion images showed an ejection fraction of 46% at rest and 46% during stress. A normal ejection fraction is greater than 47%.    There is mild global hypokinesis at rest and during stress.    The LV cavity size is normal at rest and stress.    The ECG portion of the study is uninterpretable due to left bundle branch block.    During stress, rare PVCs are noted.    The patient reported chest pain during the stress test.    When compared to the previous study from 11/3/2021, the reversible inferolateral perfusion defect is still present.    Echo 12/29/22  · The left ventricle is normal in size with mild concentric hypertrophy and mildly decreased systolic function.  · The estimated ejection fraction is 48%.  · There are segmental left ventricular wall motion abnormalities.  · There is abnormal septal wall motion.  · Grade III left ventricular diastolic dysfunction.  · Severe left atrial enlargement.  · Normal right ventricular size with normal right ventricular systolic function.  · Mild right atrial enlargement.  · There is a mechanical aortic valve present.  · The aortic valve mean gradient is 17 mmHg with a dimensionless index of 0.38.  · Moderate to moderate -severe ( 2-3+) MR  · Moderate tricuspid regurgitation.  · Normal central venous pressure (3 mmHg).  · The estimated PA systolic pressure is 62 mmHg.  · There is at least moderate pulmonary hypertension.    Prev airway: None documented.       Patient Active Problem List   Diagnosis    Atherosclerosis of lower extremity with claudication    Anticoagulant long-term use    Hyperlipidemia associated with type 2 diabetes mellitus    History of colon resection    Renovascular hypertension    History of gout    H/O mechanical aortic valve replacement    Atherosclerosis of native artery of extremity with intermittent claudication    Stage 3b chronic kidney disease    Type 2 diabetes mellitus with diabetic peripheral angiopathy without gangrene     Bilateral carotid artery disease    Diarrhea    Pulmonary heart disease    Acute renal failure superimposed on stage 3 chronic kidney disease    Metabolic acidosis with normal anion gap and bicarbonate losses    Acute anterior epistaxis    Gastrointestinal hemorrhage associated with intestinal diverticulosis    Hx of colonic polyp    Upper extremity weakness    Hypertension associated with diabetes    Bilateral carpal tunnel syndrome    Numbness and tingling in both hands    Severe carpal tunnel syndrome of right wrist    Atherosclerosis of native coronary artery of native heart without angina pectoris    Weakness    Anemia    Chronic kidney disease (CKD), stage IV (severe)    Syncope    Epistaxis    Supratherapeutic INR    Coronary artery disease involving native coronary artery of native heart without angina pectoris    Acute decompensated heart failure    Chronic combined systolic and diastolic heart failure    Paroxysmal atrial fibrillation    Mitral insufficiency       Review of patient's allergies indicates:   Allergen Reactions    Fosinopril      Intolerance- elevates potassium level      Losartan      Intolerance- elevates potassium level       Current Inpatient Medications:   allopurinoL  100 mg Oral Daily    atorvastatin  80 mg Oral Daily    bumetanide  1 mg Oral Daily    ferrous gluconate  324 mg Oral Daily with breakfast    isosorbide mononitrate  60 mg Oral Daily    metoprolol succinate  25 mg Oral Daily       Current Facility-Administered Medications on File Prior to Encounter   Medication Dose Route Frequency Provider Last Rate Last Admin    0.9%  NaCl infusion   Intravenous Continuous Devon Garnica  mL/hr at 03/02/23 0840 New Bag at 03/02/23 0840    ondansetron injection 4 mg  4 mg Intravenous Daily PRN Tara Hernandez MD        ondansetron injection 4 mg  4 mg Intravenous Daily PRN Tara Hernandez MD        phenylephrine HCL 2.5% ophthalmic  solution 1 drop  1 drop Left Eye On Call Procedure Alia Mckeon MD   1 drop at 11/13/22 0630    phenylephrine HCL 2.5% ophthalmic solution 1 drop  1 drop Right Eye On Call Procedure Alia Mckeon MD   1 drop at 12/04/22 0630    proparacaine 0.5 % ophthalmic solution 1 drop  1 drop Left Eye On Call Procedure Alia Mckeon MD   1 drop at 11/13/22 0620    proparacaine 0.5 % ophthalmic solution 1 drop  1 drop Right Eye On Call Procedure Alia Mckeon MD   1 drop at 12/04/22 0620    tropicamide 1% ophthalmic solution 1 drop  1 drop Left Eye On Call Procedure Alia Mckeon MD   1 drop at 11/13/22 0630    tropicamide 1% ophthalmic solution 1 drop  1 drop Right Eye On Call Procedure Alia Mckeon MD   1 drop at 12/04/22 0635     Current Outpatient Medications on File Prior to Encounter   Medication Sig Dispense Refill    allopurinoL (ZYLOPRIM) 100 MG tablet TAKE 1 TABLET EVERY DAY 90 tablet 3    bumetanide (BUMEX) 1 MG tablet Take 1 tablet (1 mg total) by mouth once daily. 30 tablet 11    ferrous gluconate (FERGON) 324 MG tablet Take 1 tablet (324 mg total) by mouth daily with breakfast. 90 tablet 1    isosorbide mononitrate (IMDUR) 60 MG 24 hr tablet Take 60 mg by mouth once daily.      lisinopriL (PRINIVIL,ZESTRIL) 2.5 MG tablet Take 2.5 mg by mouth once daily.      metoprolol succinate (TOPROL-XL) 25 MG 24 hr tablet Take 1 tablet (25 mg total) by mouth once daily. 90 tablet 3    omega-3 fatty acids/fish oil (FISH OIL-OMEGA-3 FATTY ACIDS) 300-1,000 mg capsule Take 1 capsule by mouth once daily.      rosuvastatin (CRESTOR) 40 MG Tab TAKE 1 TABLET EVERY EVENING. 90 tablet 3    warfarin (COUMADIN) 5 MG tablet warfarin 7.5 (1.5 tablets) sun and Thursday, and 5mg other days 30 tablet 11    albuterol (VENTOLIN HFA) 90 mcg/actuation inhaler Inhale 2 puffs into the lungs every 6 (six) hours as needed for Wheezing. Rescue 18 g 1    loperamide (IMODIUM) 2 mg capsule Take 2 mg by mouth 4  (four) times daily as needed for Diarrhea.      oxymetazoline (AFRIN) 0.05 % nasal spray Use 2 sprays by Nasal route daily as needed (nose bleed). 30 mL 0       Past Surgical History:   Procedure Laterality Date    BACK SURGERY      CARDIAC CATHETERIZATION      CARDIAC VALVE REPLACEMENT      CARDIAC VALVE SURGERY      CARPAL TUNNEL RELEASE Right 05/19/2020    Procedure: RELEASE, CARPAL TUNNEL;  Surgeon: Rupesh Norris Jr., MD;  Location: [facility];  Service: Plastics;  Laterality: Right;    CATARACT EXTRACTION Left 11/13/2022        COLON SURGERY      COLONOSCOPY N/A 03/31/2017    Procedure: COLONOSCOPY;  Surgeon: Bruno Raymond MD;  Location: [facility] (4TH FLR);  Service: Endoscopy;  Laterality: N/A;  Patient's wife requesting date.    COLONOSCOPY N/A 04/03/2018    Procedure: COLONOSCOPY;  Surgeon: Bonifacio Pelletier MD;  Location: [facility] ENDO (2ND FLR);  Service: Endoscopy;  Laterality: N/A;    COLONOSCOPY N/A 08/13/2018    Procedure: COLONOSCOPY;  Surgeon: Kam Barba MD;  Location: [facility] (2ND FLR);  Service: Endoscopy;  Laterality: N/A;  2nd floor: PA pressure 49; hx of moderate-severe valve disease     per Coumadin clinic-Patient can hold 5 days with lovenox bridge       ok to schedule per Katarina    CORONARY ANGIOGRAPHY N/A 10/04/2021    Procedure: Left heart cath +/- peripheral angiogram;  Surgeon: Jose Ruiz MD;  Location: [facility] CATH LAB;  Service: Cardiology;  Laterality: N/A;    CORONARY ANGIOGRAPHY N/A 3/2/2023    Procedure: Angiogram, Coronary;  Surgeon: Devon Garnica MD;  Location: [facility] CATH LAB;  Service: Cardiology;  Laterality: N/A;    CORONARY ANGIOPLASTY      CORONARY ARTERY BYPASS GRAFT      CORONARY BYPASS GRAFT ANGIOGRAPHY  10/04/2021    Procedure: Bypass graft study;  Surgeon: Jose Ruiz MD;  Location: [facility] CATH LAB;  Service: Cardiology;;    CORONARY BYPASS GRAFT ANGIOGRAPHY  3/2/2023    Procedure: Bypass graft study;  Surgeon: Devon Garnica,  MD;  Location: [facility] CATH LAB;  Service: Cardiology;;    EYE SURGERY      HERNIA REPAIR      INTRAOCULAR PROSTHESES INSERTION Left 2022    Procedure: INSERTION, IOL PROSTHESIS;  Surgeon: Alia Mckeon MD;  Location: [facility] OR [address] [place] [address];  Service: Ophthalmology;  Laterality: Left;    INTRAOCULAR PROSTHESES INSERTION Right 2022    Procedure: INSERTION, IOL PROSTHESIS;  Surgeon: Alia Mckeon MD;  Location: [address];  Service: Ophthalmology;  Laterality: Right;    PHACOEMULSIFICATION OF CATARACT Left 2022    Procedure: PHACOEMULSIFICATION, CATARACT;  Surgeon: Alia Mckeon MD;  Location: [facility] OR [address] [place] [address];  Service: Ophthalmology;  Laterality: Left;    PHACOEMULSIFICATION OF CATARACT Right 2022    Procedure: PHACOEMULSIFICATION, CATARACT;  Surgeon: Alia Mckeon MD;  Location: [facility] OR [address] [place] [address];  Service: Ophthalmology;  Laterality: Right;    SPINE SURGERY      TRANSESOPHAGEAL ECHOCARDIOGRAPHY N/A 3/22/2023    Procedure: ECHOCARDIOGRAM, TRANSESOPHAGEAL;  Surgeon: Thuan Diagnostic Provider;  Location: [facility] EP LAB;  Service: Anesthesiology;  Laterality: N/A;    TRANSESOPHAGEAL ECHOCARDIOGRAPHY N/A 2023    Procedure: ECHOCARDIOGRAM, TRANSESOPHAGEAL;  Surgeon: Dosc Diagnostic Provider;  Location: [facility] EP LAB;  Service: Anesthesiology;  Laterality: N/A;    VASECTOMY         Social History     Socioeconomic History    Marital status:      Spouse name: Ameena    Number of children: 3   Occupational History    Occupation: retired   Tobacco Use    Smoking status: Former     Packs/day: 1.00     Years: 20.00     Pack years: 20.00     Types: Cigarettes     Quit date: 1980     Years since quittin.6     Passive exposure: Never    Smokeless tobacco: Never   Substance and Sexual Activity    Alcohol use: No    Drug use: No    Sexual activity: Not Currently     Partners: Female     Social Determinants of Health     Financial Resource Strain: Low Risk      Difficulty of Paying Living Expenses: Not hard at all   Food Insecurity: No Food Insecurity    Worried About Running Out of Food in the Last Year: Never true    Ran Out of Food in the Last Year: Never true   Transportation Needs: No Transportation Needs    Lack of Transportation (Medical): No    Lack of Transportation (Non-Medical): No   Physical Activity: Inactive    Days of Exercise per Week: 0 days    Minutes of Exercise per Session: 0 min   Stress: No Stress Concern Present    Feeling of Stress : Not at all   Social Connections: Moderately Isolated    Frequency of Communication with Friends and Family: Once a week    Frequency of Social Gatherings with Friends and Family: More than three times a week    Attends Religion Services: Never    Active Member of Clubs or Organizations: No    Attends Club or Organization Meetings: Never    Marital Status:    Housing Stability: Low Risk     Unable to Pay for Housing in the Last Year: No    Number of Places Lived in the Last Year: 1    Unstable Housing in the Last Year: No       OBJECTIVE:     Vital Signs Range (Last 24H):  Temp:  [36.3 °C (97.3 °F)-36.7 °C (98 °F)]   Pulse:  [59-78]   Resp:  [16-20]   BP: (129-153)/(60-70)   SpO2:  [91 %-96 %]       CBC:   Recent Labs     04/12/23  0441 04/13/23  0408   WBC 4.33 4.26   RBC 3.04* 3.06*   HGB 9.3* 9.3*   HCT 29.3* 28.7*    154   MCV 96 94   MCH 30.6 30.4   MCHC 31.7* 32.4       CMP:   Recent Labs     04/12/23 0441 04/13/23  0408    139   K 4.4 4.4    110   CO2 18* 18*   BUN 65* 69*   CREATININE 3.2* 3.2*   GLU 70 82   MG 1.8 1.9   PHOS 4.7* 4.6*   CALCIUM 10.0 9.9   ALBUMIN 3.4* 3.4*   PROT 6.9 7.0   ALKPHOS 57 62   ALT 12 12   AST 19 18   BILITOT 0.4 0.4       INR:  Recent Labs     04/11/23  0801 04/12/23 0441 04/13/23  0408   INR 4.9* 4.6* 2.4*       Diagnostic Studies: No relevant studies.    EKG:   Results for orders placed or performed during the hospital encounter of  04/11/23   EKG 12-LEAD    Collection Time: 04/11/23  8:51 AM    Narrative    Test Reason : I34.0,    Vent. Rate : 056 BPM     Atrial Rate : 300 BPM     P-R Int : 000 ms          QRS Dur : 162 ms      QT Int : 512 ms       P-R-T Axes : 000 -35 165 degrees     QTc Int : 494 ms    Atrial fibrillation with Premature ventricular complexes  Left axis deviation  Left bundle branch block  Abnormal ECG  When compared with ECG of 02-MAR-2023 07:46,  No significant change was found  Confirmed by CADEN MANZANO MD ([id number]) on 4/11/2023 6:10:33 PM    Referred By: ELEANOR DONG           Confirmed By:CADEN MANZANO MD        2D ECHO:   Results for orders placed during the hospital encounter of 12/29/22    Echo    Interpretation Summary  · The left ventricle is normal in size with mild concentric hypertrophy and mildly decreased systolic function.  · The estimated ejection fraction is 48%.  · There are segmental left ventricular wall motion abnormalities.  · There is abnormal septal wall motion.  · Grade III left ventricular diastolic dysfunction.  · Severe left atrial enlargement.  · Normal right ventricular size with normal right ventricular systolic function.  · Mild right atrial enlargement.  · There is a mechanical aortic valve present.  · The aortic valve mean gradient is 17 mmHg with a dimensionless index of 0.38.  · Moderate to moderate -severe ( 2-3+) MR  · Moderate tricuspid regurgitation.  · Normal central venous pressure (3 mmHg).  · The estimated PA systolic pressure is 62 mmHg.  · There is at least moderate pulmonary hypertension.         ASSESSMENT/PLAN:         Pre-op Assessment    I have reviewed the Patient Summary Reports.     I have reviewed the Nursing Notes.    I have reviewed the Medications.     Review of Systems  Anesthesia Hx:  No problems with previous Anesthesia  History of prior surgery of interest to airway management or planning: Denies Family Hx of Anesthesia complications.   Denies Personal Hx of  Anesthesia complications.   Social:  Non-Smoker, No Alcohol Use    Hematology/Oncology:         -- Denies Anemia: Hematology Comments: INR >4  Denies Current/Recent Cancer   EENT/Dental:   Nose bleeds, including this morning.   Cardiovascular:   Hypertension Past MI Denies CAD.   CABG/stent  Denies Dysrhythmias.   Denies CHF. hyperlipidemia    Pulmonary:   Denies COPD.  Denies Asthma.  Denies Sleep Apnea.    Renal/:   Denies Chronic Renal Disease.     Hepatic/GI:   Denies GERD. Denies Liver Disease.    Musculoskeletal:   Denies Arthritis.     Neurological:   Denies CVA. Denies Neuromuscular Disease.  Denies Seizures.   Denies Chronic Pain Syndrome   Endocrine:   Diabetes Denies Hyperthyroidism.  Denies Obesity / BMI > 30  Dermatological:  Skin Normal    Psych:   denies anxiety denies depression          Physical Exam  General: Alert, Oriented, Cooperative and Well nourished    Airway:  Mallampati: II / II  Mouth Opening: Normal  TM Distance: Normal  Tongue: Normal  Neck ROM: Normal ROM    Dental:  Partial Dentures, Periodontal disease  Partial dentures removed on bottom.      Anesthesia Plan  Type of Anesthesia, risks & benefits discussed:    Anesthesia Type: MAC, Gen ETT, Gen Natural Airway  Intra-op Monitoring Plan: Standard ASA Monitors and KIRSTEN  Post Op Pain Control Plan: multimodal analgesia and IV/PO Opioids PRN  Induction:  IV  Airway Plan: Direct  Informed Consent: Informed consent signed with the Patient and all parties understand the risks and agree with anesthesia plan.  All questions answered.   ASA Score: 3  Day of Surgery Review of History & Physical: H&P Update referred to the surgeon/provider.  Anesthesia Plan Notes: Cancelled a couple days ago due to high INR and nose bleed this morning. INR 2.4 this morning.    Ready For Surgery From Anesthesia Perspective.     .

## 2023-04-13 NOTE — PROGRESS NOTES
[facility] Step[facility]  Cardiology  Progress Note    Patient Name: Dariel Urbina  MRN: [id number]  Admission Date: 4/11/2023  Hospital Length of Stay: 1 days  Code Status: Full Code   Attending Physician: Erika Carmen MD   Primary Care Physician: Devon Langston MD  Expected Discharge Date: 4/14/2023  Principal Problem:Supratherapeutic INR    Subjective:     Hospital Course:   Admitted to CCU to monitor INR for KIRSTEN.  Vit K given.  Also with elevated Cr, somewhat improving, however unclear etiology, nephrology consulted.       Interval History: naeon, INR 2.6 today, Cr stable.  NPO @ [facility], KIRSTEN tomorrow.    Review of Systems   Constitutional: Negative.   HENT: Negative.     Eyes: Negative.    Cardiovascular:  Positive for chest pain.   Respiratory:  Positive for shortness of breath.    Endocrine: Negative.    Skin: Negative.    Musculoskeletal: Negative.    Gastrointestinal: Negative.    Genitourinary: Negative.    Psychiatric/Behavioral: Negative.     Objective:     Vital Signs (Most Recent):  Temp: 97.7 °F (36.5 °C) (04/13/23 1300)  Pulse: 65 (04/13/23 1300)  Resp: 16 (04/13/23 1300)  BP: (!) 130/59 (04/13/23 1300)  SpO2: 96 % (04/13/23 1300)   Vital Signs (24h Range):  Temp:  [97.4 °F (36.3 °C)-98 °F (36.7 °C)] 97.7 °F (36.5 °C)  Pulse:  [59-78] 65  Resp:  [16-18] 16  SpO2:  [93 %-96 %] 96 %  BP: (129-153)/(59-70) 130/59     Weight: 73.5 kg (162 lb)  Body mass index is 26.96 kg/m².     SpO2: 96 %         Intake/Output Summary (Last 24 hours) at 4/13/2023 1445  Last data filed at 4/13/2023 0800  Gross per 24 hour   Intake 480 ml   Output --   Net 480 ml       Lines/Drains/Airways       Peripheral Intravenous Line  Duration                  Peripheral IV - Single Lumen 04/11/23 0823 20 G Left Antecubital 2 days                    Physical Exam  Constitutional:       Appearance: Normal appearance.   HENT:      Head: Normocephalic and atraumatic.      Mouth/Throat:      Mouth: Mucous membranes are  moist.   Cardiovascular:      Rate and Rhythm: Normal rate and regular rhythm.   Musculoskeletal:         General: Normal range of motion.      Cervical back: Normal range of motion.      Right lower leg: No edema.      Left lower leg: No edema.   Skin:     General: Skin is warm.      Capillary Refill: Capillary refill takes less than 2 seconds.   Neurological:      General: No focal deficit present.      Mental Status: He is alert and oriented to person, place, and time.       Significant Labs: CMP   Recent Labs   Lab 04/11/23 1932 04/12/23 0441 04/13/23  0408     137 139 139   K 4.6  4.8 4.4 4.4     108 110 110   CO2 18*  18* 18* 18*   *  152* 70 82   BUN 63*  63* 65* 69*   CREATININE 3.4*  3.5* 3.2* 3.2*   CALCIUM 9.8  10.2 10.0 9.9   PROT 7.1 6.9 7.0   ALBUMIN 3.5 3.4* 3.4*   BILITOT 0.4 0.4 0.4   ALKPHOS 58 57 62   AST 20 19 18   ALT 12 12 12   ANIONGAP 11  11 11 11    and CBC   Recent Labs   Lab 04/11/23 1932 04/12/23 0441 04/13/23  0408   WBC 4.41 4.33 4.26   HGB 9.5* 9.3* 9.3*   HCT 29.7* 29.3* 28.7*    150 154       Significant Imaging:  reviewed    Assessment and Plan:       * Supratherapeutic INR  INR 4.9 on admission, with recent changes by coumadin clinic.  Previously therapeutic one week prior to admission.  - trend INR  - restart warfarin    Mitral insufficiency  Presented for outpt KIRSTEN for mitralclip eval, however INR supratherapeutic (4.9) on presentation.  - will hold warfarin, trend INR until therapeutic  - keep on tele  - tentatively plan for KIRSTEN tomorrow, NPO @ [facility]    Chronic combined systolic and diastolic heart failure    Lab Results   Component Value Date    TROPONINI 0.164 (H) 12/29/2022     (H) 04/12/2023     Results for orders placed during the hospital encounter of 12/29/22  Echo  Interpretation Summary  · The left ventricle is normal in size with mild concentric hypertrophy and mildly decreased systolic function.  · The estimated ejection  fraction is 48%.  · There are segmental left ventricular wall motion abnormalities.  · There is abnormal septal wall motion.  · Grade III left ventricular diastolic dysfunction.  · Severe left atrial enlargement.  · Normal right ventricular size with normal right ventricular systolic function.  · Mild right atrial enlargement.  · There is a mechanical aortic valve present.  · The aortic valve mean gradient is 17 mmHg with a dimensionless index of 0.38.  · Moderate to moderate -severe ( 2-3+) MR  · Moderate tricuspid regurgitation.  · Normal central venous pressure (3 mmHg).  · The estimated PA systolic pressure is 62 mmHg.  · There is at least moderate pulmonary hypertension.  - appears compensated on exam  - continue home bumex   - hold ACE for JIM  - Cardiac diet, fluid restriction at 1500 cc with strict I/Os and daily standing weights  - Maintain Mg >2, K >4  - pending KIRSTEN once INR stabilizes    Acute renal failure superimposed on stage 3 chronic kidney disease  Baseline Cr 1.8-2.2.    Lab Results   Component Value Date    BUN 69 (H) 04/13/2023    CREATININE 3.2 (H) 04/13/2023   - renal ultrasound  - hold home lisinopril  - strict I/O  - CMP qd, trend Cr  - renally dose all medications  - avoid nephrotoxic agents, NSAIDs, IV contrast, ACE/ARB        VTE Risk Mitigation (From admission, onward)         Ordered     warfarin (COUMADIN) tablet 7.5 mg  Once per day on Sun Tue Thu         04/13/23 1026     warfarin (COUMADIN) tablet 5 mg  Once per day on Mon Wed Fri Sat         04/13/23 1026     warfarin (COUMADIN) tablet 7.5 mg  Once         04/13/23 1026     Reason for No Pharmacological VTE Prophylaxis  Once        Question:  Reasons:  Answer:  Risk of Bleeding    04/11/23 1448     IP VTE HIGH RISK PATIENT  Once         04/11/23 1448     Place sequential compression device  Until discontinued         04/11/23 1448                Alessio Wray MD  Cardiology  [facility] - Cardiology Stepdown

## 2023-04-13 NOTE — CONSULTS
[facility] - Cardiology Stepdown  Nephrology  Consult Note    Patient Name: Dariel Urbina  MRN: [id number]  Admission Date: 4/11/2023  Hospital Length of Stay: 1 days  Attending Provider: Erika Carmen MD   Primary Care Physician: Devon Langston MD  Principal Problem:Supratherapeutic INR    Inpatient consult to Nephrology  Consult performed by: Dl Segal MD  Consult ordered by: Alessio Wray MD        Subjective:     HPI: 82M with HTN, DMII, CAD s/p CABG in 90s, s/p aortic valve replacement on warfarin, combined HF (grade III diastolic dysfunction, EF 45%), mod/severe MR (evaling for mitral clip), CKD4 here with elevated INR. He is being evaluated for mitral clip and was scheduled for KIRSTEN however found to have elevated INR so was hospitalized. Also noted to have elevated Cr to 3.5 on admission from baseline of 2-2.2. States he has longstanding chronic kidney disease but has not seen a nephrologist in some time. Denies any recent medication changes. No NSAID use. No vomiting and having normal PO intake. Unchanged urine output, no changes in color. He has noted some increased foaminess to his urine recently, unclear timeline. He is chronically SOB and has not noted any recent worsening.     Since admission, his Cr has decreased from 3.5 to 3.2, now stable. Has been hemodynamically stable with normal electrolytes. UA with 2+ protein and 2+ occult blood, normal T bili. Urine prot:Cr ratio 0.99. Renal US pending. Nephrology consulted for JIM on CKD vs progression of CKD with new baseline.      Past Medical History:   Diagnosis Date    Anemia of chronic renal failure, stage 3 (moderate) 05/27/2015    Anticoagulant long-term use     Atherosclerosis of coronary artery bypass graft of native heart without angina pectoris 09/11/2012    3-27-18 [facility] Two vessel coronary artery disease.   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    Bilateral carotid artery disease 02/09/2017    Bleeding  from the nose     Bleeding nose 03/21/2018    Cancer     Cataract     CHF (congestive heart failure)     CKD (chronic kidney disease) stage 3, GFR 30-59 ml/min 05/27/2015    Claudication of left lower extremity 09/17/2014    Colon polyp     Encounter for blood transfusion     Gastroesophageal reflux disease without esophagitis 03/19/2018    Gastrointestinal hemorrhage associated with intestinal diverticulosis 04/01/2018    Glaucoma     H/O mechanical aortic valve replacement 09/17/2014    History of gout 09/26/2012    Hyperparathyroidism due to renal insufficiency 07/27/2015    Internal hemorrhoid 04/03/2018    Long term current use of anticoagulant therapy 09/26/2012    Mechanical heart valve present     Metabolic acidosis with normal anion gap and bicarbonate losses 03/20/2018    Mixed hyperlipidemia 09/26/2012    NSTEMI (non-ST elevated myocardial infarction) 03/21/2018    Obesity, diabetes, and hypertension syndrome 02/23/2016    Paroxysmal atrial fibrillation 02/06/2023    PVD (peripheral vascular disease) 09/11/2012    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    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 02/01/2023    vertex scalp    Syncope 09/29/2022    Type 2 diabetes mellitus with diabetic peripheral angiopathy without gangrene 05/27/2015    Type 2 diabetes mellitus with stage 3 chronic kidney disease, without long-term current use of insulin 10/02/2013       Past Surgical History:   Procedure Laterality Date    BACK SURGERY      CARDIAC CATHETERIZATION      CARDIAC VALVE REPLACEMENT      CARDIAC VALVE SURGERY      CARPAL TUNNEL RELEASE Right 05/19/2020    Procedure: RELEASE, CARPAL TUNNEL;  Surgeon: Rupesh Norris Jr., MD;  Location[facility];  Service: Plastics;  Laterality: Right;    CATARACT EXTRACTION Left 11/13/2022        COLON SURGERY      COLONOSCOPY N/A 03/31/2017    Procedure: COLONOSCOPY;  Surgeon: Bruno Raymond MD;  Location: [facility] ENDO (4TH FLR);   Service: Endoscopy;  Laterality: N/A;  Patient's wife requesting date.    COLONOSCOPY N/A 04/03/2018    Procedure: COLONOSCOPY;  Surgeon: Bonifacio Pelletier MD;  Location: [facility] ENDO (2ND FLR);  Service: Endoscopy;  Laterality: N/A;    COLONOSCOPY N/A 08/13/2018    Procedure: COLONOSCOPY;  Surgeon: Kam Barba MD;  Location: [facility] ENDO (2ND FLR);  Service: Endoscopy;  Laterality: N/A;  2nd floor: PA pressure 49; hx of moderate-severe valve disease     per Coumadin clinic-Patient can hold 5 days with lovenox bridge       ok to schedule per Katarina    CORONARY ANGIOGRAPHY N/A 10/04/2021    Procedure: Left heart cath +/- peripheral angiogram;  Surgeon: Jose Ruiz MD;  Location: [facility] CATH LAB;  Service: Cardiology;  Laterality: N/A;    CORONARY ANGIOGRAPHY N/A 3/2/2023    Procedure: Angiogram, Coronary;  Surgeon: Devon Garnica MD;  Location: [facility] CATH LAB;  Service: Cardiology;  Laterality: N/A;    CORONARY ANGIOPLASTY      CORONARY ARTERY BYPASS GRAFT      CORONARY BYPASS GRAFT ANGIOGRAPHY  10/04/2021    Procedure: Bypass graft study;  Surgeon: Jose Ruiz MD;  Location: [facility] CATH LAB;  Service: Cardiology;;    CORONARY BYPASS GRAFT ANGIOGRAPHY  3/2/2023    Procedure: Bypass graft study;  Surgeon: Devon Garnica MD;  Location: [facility] CATH LAB;  Service: Cardiology;;    EYE SURGERY      HERNIA REPAIR      INTRAOCULAR PROSTHESES INSERTION Left 11/13/2022    Procedure: INSERTION, IOL PROSTHESIS;  Surgeon: Alia Mckeon MD;  Location: [address];  Service: Ophthalmology;  Laterality: Left;    INTRAOCULAR PROSTHESES INSERTION Right 12/04/2022    Procedure: INSERTION, IOL PROSTHESIS;  Surgeon: Alia Mckeon MD;  Location: [address];  Service: Ophthalmology;  Laterality: Right;    PHACOEMULSIFICATION OF CATARACT Left 11/13/2022    Procedure: PHACOEMULSIFICATION, CATARACT;  Surgeon: Alia Mckeon MD;  Location: [address];  Service: Ophthalmology;  Laterality: Left;     PHACOEMULSIFICATION OF CATARACT Right 12/04/2022    Procedure: PHACOEMULSIFICATION, CATARACT;  Surgeon: Alia Mckeon MD;  Location: [facility] OR [address] [place] [address];  Service: Ophthalmology;  Laterality: Right;    SPINE SURGERY      TRANSESOPHAGEAL ECHOCARDIOGRAPHY N/A 3/22/2023    Procedure: ECHOCARDIOGRAM, TRANSESOPHAGEAL;  Surgeon: [facility] Provider;  Location: [facility] EP LAB;  Service: Anesthesiology;  Laterality: N/A;    TRANSESOPHAGEAL ECHOCARDIOGRAPHY N/A 4/11/2023    Procedure: ECHOCARDIOGRAM, TRANSESOPHAGEAL;  Surgeon: [facility] Diagnostic Provider;  Location: [facility] EP LAB;  Service: Anesthesiology;  Laterality: N/A;    VASECTOMY         Review of patient's allergies indicates:   Allergen Reactions    Fosinopril      Intolerance- elevates potassium level      Losartan      Intolerance- elevates potassium level     Current Facility-Administered Medications   Medication Frequency    albuterol inhaler 2 puff Q6H PRN    allopurinol split tablet 100 mg Daily    atorvastatin tablet 80 mg Daily    bumetanide tablet 1 mg Daily    dextrose 10% bolus 125 mL 125 mL PRN    dextrose 10% bolus 250 mL 250 mL PRN    dextrose 40 % gel 15,000 mg PRN    dextrose 40 % gel 30,000 mg PRN    ferrous gluconate tablet 324 mg Daily with breakfast    glucagon (human recombinant) injection 1 mg PRN    isosorbide mononitrate 24 hr tablet 60 mg Daily    metoprolol succinate (TOPROL-XL) 24 hr tablet 25 mg Daily    naloxone 0.4 mg/mL injection 0.02 mg PRN    sodium chloride 0.9% flush 10 mL Q12H PRN    sodium chloride 0.9% flush 3 mL PRN    [START ON 4/14/2023] warfarin (COUMADIN) tablet 5 mg Once per day on Mon Wed Fri Sat    warfarin (COUMADIN) tablet 7.5 mg Once    [START ON 4/16/2023] warfarin (COUMADIN) tablet 7.5 mg Once per day on Sun Tue Thu     Facility-Administered Medications Ordered in Other Encounters   Medication Frequency    0.9%  NaCl infusion Continuous    ondansetron injection 4 mg Daily PRN    ondansetron  injection 4 mg Daily PRN    phenylephrine HCL 2.5% ophthalmic solution 1 drop On Call Procedure    phenylephrine HCL 2.5% ophthalmic solution 1 drop On Call Procedure    proparacaine 0.5 % ophthalmic solution 1 drop On Call Procedure    proparacaine 0.5 % ophthalmic solution 1 drop On Call Procedure    tropicamide 1% ophthalmic solution 1 drop On Call Procedure    tropicamide 1% ophthalmic solution 1 drop On Call Procedure     Family History       Problem Relation (Age of Onset)    Alcohol abuse Father    Diabetes Brother    Heart disease Mother, Father, Brother, Sister    Heart failure Mother, Father, Brother    No Known Problems Sister, Maternal Grandmother, Maternal Grandfather, Paternal Grandmother, Paternal Grandfather, Maternal Aunt, Maternal Uncle, Paternal Aunt, Paternal Uncle          Tobacco Use    Smoking status: Former     Packs/day: 1.00     Years: 20.00     Pack years: 20.00     Types: Cigarettes     Quit date: 1980     Years since quittin.6     Passive exposure: Never    Smokeless tobacco: Never   Substance and Sexual Activity    Alcohol use: No    Drug use: No    Sexual activity: Not Currently     Partners: Female     Review of Systems   Constitutional:  Negative for chills, fatigue and fever.   HENT:  Negative for sore throat and trouble swallowing.    Eyes:  Negative for pain and visual disturbance.   Respiratory:  Positive for shortness of breath. Negative for cough and chest tightness.    Cardiovascular:  Negative for chest pain and leg swelling.   Gastrointestinal:  Positive for diarrhea (intermittent). Negative for abdominal pain, constipation, nausea and vomiting.   Endocrine: Negative for polyuria.   Genitourinary:  Negative for dysuria, flank pain and frequency.   Musculoskeletal:  Negative for arthralgias and gait problem.   Neurological:  Negative for dizziness, numbness and headaches.   Psychiatric/Behavioral:  Negative for agitation and confusion.    Objective:      Vital Signs (Most Recent):  Temp: 97.7 °F (36.5 °C) (04/13/23 1300)  Pulse: 65 (04/13/23 1300)  Resp: 16 (04/13/23 1300)  BP: (!) 130/59 (04/13/23 1300)  SpO2: 96 % (04/13/23 1300)   Vital Signs (24h Range):  Temp:  [97.4 °F (36.3 °C)-98 °F (36.7 °C)] 97.7 °F (36.5 °C)  Pulse:  [59-78] 65  Resp:  [16-18] 16  SpO2:  [93 %-96 %] 96 %  BP: (129-153)/(59-70) 130/59     Weight: 73.5 kg (162 lb) (04/11/23 0821)  Body mass index is 26.96 kg/m².  Body surface area is 1.84 meters squared.    I/O last 3 completed shifts:  In: 702 [P.O.:702]  Out: -     Physical Exam  Vitals reviewed.   Constitutional:       General: He is not in acute distress.     Appearance: Normal appearance. He is not ill-appearing.   HENT:      Head: Normocephalic and atraumatic.      Right Ear: External ear normal.      Left Ear: External ear normal.      Nose: Nose normal.      Mouth/Throat:      Pharynx: Oropharynx is clear. No posterior oropharyngeal erythema.   Eyes:      General: No scleral icterus.     Conjunctiva/sclera: Conjunctivae normal.   Cardiovascular:      Rate and Rhythm: Normal rate and regular rhythm.      Pulses: Normal pulses.      Heart sounds: Murmur heard.      Comments: JVD  to ear lobe  Mechanical valve sound  Pulmonary:      Effort: Pulmonary effort is normal.      Breath sounds: Rales present.   Abdominal:      General: There is no distension.      Palpations: Abdomen is soft.      Tenderness: There is no abdominal tenderness.   Musculoskeletal:         General: Normal range of motion.      Cervical back: Normal range of motion.      Right lower leg: No edema.      Left lower leg: No edema.   Skin:     General: Skin is warm and dry.   Neurological:      General: No focal deficit present.      Mental Status: He is alert and oriented to person, place, and time.   Psychiatric:         Mood and Affect: Mood normal.         Behavior: Behavior normal.       Significant Labs:  CMP:   Recent Labs   Lab 04/13/23  0408   GLU 82    CALCIUM 9.9   ALBUMIN 3.4*   PROT 7.0      K 4.4   CO2 18*      BUN 69*   CREATININE 3.2*   ALKPHOS 62   ALT 12   AST 18   BILITOT 0.4     All labs within the past 24 hours have been reviewed.    Significant Imaging:  Reviewed    Assessment/Plan:     Renal/  Acute renal failure superimposed on stage 3 chronic kidney disease  Patient with longstanding CKD (presumed 2/2 HTN and DMII) here with JIM vs progression of disease. Cr stable at 3.2 from baseline of 2.2. No clear inciting factors to suggest pre-renal etiology. No NSAID use, medication changes. Reports normal urination so lower suspicion for obstructive uropathy however renal US is pending. ~1g proteinuria and has undergone workup for proteinuria (many years ago) which at that time was negative. Will need a new nephrologist outpatient for CKD management. It's possible he is having anticoagulation-related nephropathy given supratherapeutic INR in setting of mechanical valve. Will check urine microscopy.    Recommendations:  - Agree with retroperitoneal US, will follow  - CPK ordered given occult blood on UA  - Please place morrison catheter for strict I/Os  - Urine microscopy  - Continue holding lisinopril for now  - Renal diet when not NPO        Thank you for your consult. I will follow-up with patient. Please contact us if you have any additional questions.    Dl Segal MD  Nephrology  [facility] - Cardiology Stepdown

## 2023-04-13 NOTE — ASSESSMENT & PLAN NOTE
Patient with longstanding CKD (presumed 2/2 HTN and DMII) here with JIM vs progression of disease. Cr stable at 3.2 from baseline of 2.2. No clear inciting factors to suggest pre-renal etiology. No NSAID use, medication changes. Reports normal urination so lower suspicion for obstructive uropathy however renal US is pending. ~1g proteinuria and has undergone workup for proteinuria (many years ago) which at that time was negative. Will need a new nephrologist outpatient for CKD management. It's possible he is having anticoagulation-related nephropathy given supratherapeutic INR in setting of mechanical valve. Will check urine microscopy.    Recommendations:  - Agree with retroperitoneal US, will follow  - CPK ordered given occult blood on UA  - Please place morrison catheter for strict I/Os  - Urine microscopy  - Continue holding lisinopril for now  - Renal diet when not NPO

## 2023-04-13 NOTE — PLAN OF CARE
Problem: Adult Inpatient Plan of Care  Goal: Plan of Care Review  Outcome: Ongoing, Progressing  Goal: Patient-Specific Goal (Individualized)  Outcome: Ongoing, Progressing  Goal: Absence of Hospital-Acquired Illness or Injury  Outcome: Ongoing, Progressing  Goal: Optimal Comfort and Wellbeing  Outcome: Ongoing, Progressing  Goal: Readiness for Transition of Care  Outcome: Ongoing, Progressing     Problem: Diabetes Comorbidity  Goal: Blood Glucose Level Within Targeted Range  Outcome: Ongoing, Progressing     Problem: Fall Injury Risk  Goal: Absence of Fall and Fall-Related Injury  Outcome: Ongoing, Progressing     Problem: Fluid and Electrolyte Imbalance (Acute Kidney Injury/Impairment)  Goal: Fluid and Electrolyte Balance  Outcome: Ongoing, Progressing     Problem: Oral Intake Inadequate (Acute Kidney Injury/Impairment)  Goal: Optimal Nutrition Intake  Outcome: Ongoing, Progressing     Problem: Renal Function Impairment (Acute Kidney Injury/Impairment)  Goal: Effective Renal Function  Outcome: Ongoing, Progressing

## 2023-04-13 NOTE — SUBJECTIVE & OBJECTIVE
Past Medical History:   Diagnosis Date    Anemia of chronic renal failure, stage 3 (moderate) 05/27/2015    Anticoagulant long-term use     Atherosclerosis of coronary artery bypass graft of native heart without angina pectoris 09/11/2012    3-27-18 [facility] Two vessel coronary artery disease.   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    Bilateral carotid artery disease 02/09/2017    Bleeding from the nose     Bleeding nose 03/21/2018    Cancer     Cataract     CHF (congestive heart failure)     CKD (chronic kidney disease) stage 3, GFR 30-59 ml/min 05/27/2015    Claudication of left lower extremity 09/17/2014    Colon polyp     Encounter for blood transfusion     Gastroesophageal reflux disease without esophagitis 03/19/2018    Gastrointestinal hemorrhage associated with intestinal diverticulosis 04/01/2018    Glaucoma     H/O mechanical aortic valve replacement 09/17/2014    History of gout 09/26/2012    Hyperparathyroidism due to renal insufficiency 07/27/2015    Internal hemorrhoid 04/03/2018    Long term current use of anticoagulant therapy 09/26/2012    Mechanical heart valve present     Metabolic acidosis with normal anion gap and bicarbonate losses 03/20/2018    Mixed hyperlipidemia 09/26/2012    NSTEMI (non-ST elevated myocardial infarction) 03/21/2018    Obesity, diabetes, and hypertension syndrome 02/23/2016    Paroxysmal atrial fibrillation 02/06/2023    PVD (peripheral vascular disease) 09/11/2012    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       Past Surgical History:   Procedure Laterality Date    BACK SURGERY      CARDIAC CATHETERIZATION      CARDIAC VALVE REPLACEMENT      CARDIAC VALVE  SURGERY      CARPAL TUNNEL RELEASE Right 05/19/2020    Procedure: RELEASE, CARPAL TUNNEL;  Surgeon: Ruepsh Norris Jr., MD;  Location: [facility];  Service: Plastics;  Laterality: Right;    CATARACT EXTRACTION Left 11/13/2022        COLON SURGERY      COLONOSCOPY N/A 03/31/2017    Procedure: COLONOSCOPY;  Surgeon: Bruno Raymond MD;  Location: [facility] ENDO (4TH FLR);  Service: Endoscopy;  Laterality: N/A;  Patient's wife requesting date.    COLONOSCOPY N/A 04/03/2018    Procedure: COLONOSCOPY;  Surgeon: Bonifacio Pelletier MD;  Location: [facility] ENDO (2ND FLR);  Service: Endoscopy;  Laterality: N/A;    COLONOSCOPY N/A 08/13/2018    Procedure: COLONOSCOPY;  Surgeon: Kam Barba MD;  Location: [facility] ENDO (2ND FLR);  Service: Endoscopy;  Laterality: N/A;  2nd floor: PA pressure 49; hx of moderate-severe valve disease     per Coumadin clinic-Patient can hold 5 days with lovenox bridge       ok to schedule per Katarina    CORONARY ANGIOGRAPHY N/A 10/04/2021    Procedure: Left heart cath +/- peripheral angiogram;  Surgeon: Jose Ruiz MD;  Location: [facility] CATH LAB;  Service: Cardiology;  Laterality: N/A;    CORONARY ANGIOGRAPHY N/A 3/2/2023    Procedure: Angiogram, Coronary;  Surgeon: Devon Garnica MD;  Location: [facility] CATH LAB;  Service: Cardiology;  Laterality: N/A;    CORONARY ANGIOPLASTY      CORONARY ARTERY BYPASS GRAFT      CORONARY BYPASS GRAFT ANGIOGRAPHY  10/04/2021    Procedure: Bypass graft study;  Surgeon: Jose Ruiz MD;  Location: [facility] CATH LAB;  Service: Cardiology;;    CORONARY BYPASS GRAFT ANGIOGRAPHY  3/2/2023    Procedure: Bypass graft study;  Surgeon: Devon Garnica MD;  Location: [facility] CATH LAB;  Service: Cardiology;;    EYE SURGERY      HERNIA REPAIR      INTRAOCULAR PROSTHESES INSERTION Left 11/13/2022    Procedure: INSERTION, IOL PROSTHESIS;  Surgeon: Alia Mckeon MD;  Location: [facility] 1ST FLR;  Service: Ophthalmology;  Laterality: Left;    INTRAOCULAR PROSTHESES INSERTION  Right 12/04/2022    Procedure: INSERTION, IOL PROSTHESIS;  Surgeon: Alia Mckeon MD;  [facility];  Service: Ophthalmology;  Laterality: Right;    PHACOEMULSIFICATION OF CATARACT Left 11/13/2022    Procedure: PHACOEMULSIFICATION, CATARACT;  Surgeon: Alia Mckeon MD;  [facility];  Service: Ophthalmology;  Laterality: Left;    PHACOEMULSIFICATION OF CATARACT Right 12/04/2022    Procedure: PHACOEMULSIFICATION, CATARACT;  Surgeon: Alia Mckeon MD;  [facility];  Service: Ophthalmology;  Laterality: Right;    SPINE SURGERY      TRANSESOPHAGEAL ECHOCARDIOGRAPHY N/A 3/22/2023    Procedure: ECHOCARDIOGRAM, TRANSESOPHAGEAL;  Surgeon: [facility] Provider;  Location: [facility] EP LAB;  Service: Anesthesiology;  Laterality: N/A;    TRANSESOPHAGEAL ECHOCARDIOGRAPHY N/A 4/11/2023    Procedure: ECHOCARDIOGRAM, TRANSESOPHAGEAL;  Surgeon: [facility] Diagnostic Provider;  Location: [facility] EP LAB;  Service: Anesthesiology;  Laterality: N/A;    VASECTOMY         Review of patient's allergies indicates:   Allergen Reactions    Fosinopril      Intolerance- elevates potassium level      Losartan      Intolerance- elevates potassium level     Current Facility-Administered Medications   Medication Frequency    albuterol inhaler 2 puff Q6H PRN    allopurinol split tablet 100 mg Daily    atorvastatin tablet 80 mg Daily    bumetanide tablet 1 mg Daily    dextrose 10% bolus 125 mL 125 mL PRN    dextrose 10% bolus 250 mL 250 mL PRN    dextrose 40 % gel 15,000 mg PRN    dextrose 40 % gel 30,000 mg PRN    ferrous gluconate tablet 324 mg Daily with breakfast    glucagon (human recombinant) injection 1 mg PRN    isosorbide mononitrate 24 hr tablet 60 mg Daily    metoprolol succinate (TOPROL-XL) 24 hr tablet 25 mg Daily    naloxone 0.4 mg/mL injection 0.02 mg PRN    sodium chloride 0.9% flush 10 mL Q12H PRN    sodium chloride 0.9% flush 3 mL PRN    [START ON 4/14/2023] warfarin (COUMADIN) tablet 5 mg  Once per day on Mon Wed Fri Sat    warfarin (COUMADIN) tablet 7.5 mg Once    [START ON 2023] warfarin (COUMADIN) tablet 7.5 mg Once per day on Sun Tue Thu     Facility-Administered Medications Ordered in Other Encounters   Medication Frequency    0.9%  NaCl infusion Continuous    ondansetron injection 4 mg Daily PRN    ondansetron injection 4 mg Daily PRN    phenylephrine HCL 2.5% ophthalmic solution 1 drop On Call Procedure    phenylephrine HCL 2.5% ophthalmic solution 1 drop On Call Procedure    proparacaine 0.5 % ophthalmic solution 1 drop On Call Procedure    proparacaine 0.5 % ophthalmic solution 1 drop On Call Procedure    tropicamide 1% ophthalmic solution 1 drop On Call Procedure    tropicamide 1% ophthalmic solution 1 drop On Call Procedure     Family History       Problem Relation (Age of Onset)    Alcohol abuse Father    Diabetes Brother    Heart disease Mother, Father, Brother, Sister    Heart failure Mother, Father, Brother    No Known Problems Sister, Maternal Grandmother, Maternal Grandfather, Paternal Grandmother, Paternal Grandfather, Maternal Aunt, Maternal Uncle, Paternal Aunt, Paternal Uncle          Tobacco Use    Smoking status: Former     Packs/day: 1.00     Years: 20.00     Pack years: 20.00     Types: Cigarettes     Quit date: 1980     Years since quittin.6     Passive exposure: Never    Smokeless tobacco: Never   Substance and Sexual Activity    Alcohol use: No    Drug use: No    Sexual activity: Not Currently     Partners: Female     Review of Systems   Constitutional:  Negative for chills, fatigue and fever.   HENT:  Negative for sore throat and trouble swallowing.    Eyes:  Negative for pain and visual disturbance.   Respiratory:  Positive for shortness of breath. Negative for cough and chest tightness.    Cardiovascular:  Negative for chest pain and leg swelling.   Gastrointestinal:  Positive for diarrhea (intermittent). Negative for abdominal pain, constipation, nausea  and vomiting.   Endocrine: Negative for polyuria.   Genitourinary:  Negative for dysuria, flank pain and frequency.   Musculoskeletal:  Negative for arthralgias and gait problem.   Neurological:  Negative for dizziness, numbness and headaches.   Psychiatric/Behavioral:  Negative for agitation and confusion.    Objective:     Vital Signs (Most Recent):  Temp: 97.7 °F (36.5 °C) (04/13/23 1300)  Pulse: 65 (04/13/23 1300)  Resp: 16 (04/13/23 1300)  BP: (!) 130/59 (04/13/23 1300)  SpO2: 96 % (04/13/23 1300)   Vital Signs (24h Range):  Temp:  [97.4 °F (36.3 °C)-98 °F (36.7 °C)] 97.7 °F (36.5 °C)  Pulse:  [59-78] 65  Resp:  [16-18] 16  SpO2:  [93 %-96 %] 96 %  BP: (129-153)/(59-70) 130/59     Weight: 73.5 kg (162 lb) (04/11/23 0821)  Body mass index is 26.96 kg/m².  Body surface area is 1.84 meters squared.    I/O last 3 completed shifts:  In: 702 [P.O.:702]  Out: -     Physical Exam  Vitals reviewed.   Constitutional:       General: He is not in acute distress.     Appearance: Normal appearance. He is not ill-appearing.   HENT:      Head: Normocephalic and atraumatic.      Right Ear: External ear normal.      Left Ear: External ear normal.      Nose: Nose normal.      Mouth/Throat:      Pharynx: Oropharynx is clear. No posterior oropharyngeal erythema.   Eyes:      General: No scleral icterus.     Conjunctiva/sclera: Conjunctivae normal.   Cardiovascular:      Rate and Rhythm: Normal rate and regular rhythm.      Pulses: Normal pulses.      Heart sounds: Murmur heard.      Comments: JVD  to ear lobe  Mechanical valve sound  Pulmonary:      Effort: Pulmonary effort is normal.      Breath sounds: Rales present.   Abdominal:      General: There is no distension.      Palpations: Abdomen is soft.      Tenderness: There is no abdominal tenderness.   Musculoskeletal:         General: Normal range of motion.      Cervical back: Normal range of motion.      Right lower leg: No edema.      Left lower leg: No edema.   Skin:      General: Skin is warm and dry.   Neurological:      General: No focal deficit present.      Mental Status: He is alert and oriented to person, place, and time.   Psychiatric:         Mood and Affect: Mood normal.         Behavior: Behavior normal.       Significant Labs:  CMP:   Recent Labs   Lab 04/13/23  0408   GLU 82   CALCIUM 9.9   ALBUMIN 3.4*   PROT 7.0      K 4.4   CO2 18*      BUN 69*   CREATININE 3.2*   ALKPHOS 62   ALT 12   AST 18   BILITOT 0.4     All labs within the past 24 hours have been reviewed.    Significant Imaging:  Reviewed

## 2023-04-13 NOTE — HPI
82M with HTN, DMII, CAD s/p CABG in 90s, s/p aortic valve replacement on warfarin, combined HF (grade III diastolic dysfunction, EF 45%), mod/severe MR (evaling for mitral clip), CKD4 here with elevated INR. He is being evaluated for mitral clip and was scheduled for KIRSTEN however found to have elevated INR so was hospitalized. Also noted to have elevated Cr to 3.5 on admission from baseline of 2-2.2. States he has longstanding chronic kidney disease but has not seen a nephrologist in some time. Denies any recent medication changes. No NSAID use. No vomiting and having normal PO intake. Unchanged urine output, no changes in color. He has noted some increased foaminess to his urine recently, unclear timeline. He is chronically SOB and has not noted any recent worsening.     Since admission, his Cr has decreased from 3.5 to 3.2, now stable. Has been hemodynamically stable with normal electrolytes. UA with 2+ protein and 2+ occult blood, normal T bili. Urine prot:Cr ratio 0.99. Renal US pending. Nephrology consulted for JIM on CKD vs progression of CKD with new baseline.

## 2023-04-13 NOTE — ASSESSMENT & PLAN NOTE
Presented for outpt KIRSTEN for mitralclip eval, however INR supratherapeutic (4.9) on presentation.  - will hold warfarin, trend INR until therapeutic  - keep on tele  - tentatively plan for KIRSTEN tomorrow, NPO @ [facility]

## 2023-04-14 ENCOUNTER — ANESTHESIA (OUTPATIENT)
Dept: MEDSURG UNIT | Facility: HOSPITAL | Age: 83
End: 2023-04-14
Payer: MEDICARE

## 2023-04-14 PROBLEM — N32.9 LESION OF URINARY BLADDER: Status: ACTIVE | Noted: 2023-04-14

## 2023-04-14 PROBLEM — N32.89 BLADDER MASS: Status: ACTIVE | Noted: 2023-04-14

## 2023-04-14 LAB
ALBUMIN SERPL BCP-MCNC: 3.5 G/DL (ref 3.5–5.2)
ALP SERPL-CCNC: 68 U/L (ref 55–135)
ALT SERPL W/O P-5'-P-CCNC: 11 U/L (ref 10–44)
ANION GAP SERPL CALC-SCNC: 10 MMOL/L (ref 8–16)
APTT PPP: 29.9 SEC (ref 21–32)
APTT PPP: 85.8 SEC (ref 21–32)
AST SERPL-CCNC: 16 U/L (ref 10–40)
BASOPHILS # BLD AUTO: 0.04 K/UL (ref 0–0.2)
BASOPHILS NFR BLD: 0.8 % (ref 0–1.9)
BILIRUB SERPL-MCNC: 0.4 MG/DL (ref 0.1–1)
BSA FOR ECHO PROCEDURE: 1.84 M2
BUN SERPL-MCNC: 69 MG/DL (ref 8–23)
CALCIUM SERPL-MCNC: 10.5 MG/DL (ref 8.7–10.5)
CHLORIDE SERPL-SCNC: 109 MMOL/L (ref 95–110)
CO2 SERPL-SCNC: 19 MMOL/L (ref 23–29)
CREAT SERPL-MCNC: 2.7 MG/DL (ref 0.5–1.4)
DIFFERENTIAL METHOD: ABNORMAL
EJECTION FRACTION: 60 %
EOSINOPHIL # BLD AUTO: 0.1 K/UL (ref 0–0.5)
EOSINOPHIL NFR BLD: 2.8 % (ref 0–8)
ERYTHROCYTE [DISTWIDTH] IN BLOOD BY AUTOMATED COUNT: 13.5 % (ref 11.5–14.5)
EST. GFR  (NO RACE VARIABLE): 23 ML/MIN/1.73 M^2
GLUCOSE SERPL-MCNC: 94 MG/DL (ref 70–110)
HCT VFR BLD AUTO: 29.7 % (ref 40–54)
HGB BLD-MCNC: 9.8 G/DL (ref 14–18)
IMM GRANULOCYTES # BLD AUTO: 0.01 K/UL (ref 0–0.04)
IMM GRANULOCYTES NFR BLD AUTO: 0.2 % (ref 0–0.5)
INR PPP: 1.5 (ref 0.8–1.2)
INR PPP: 1.5 (ref 0.8–1.2)
LYMPHOCYTES # BLD AUTO: 1.4 K/UL (ref 1–4.8)
LYMPHOCYTES NFR BLD: 26.8 % (ref 18–48)
MAGNESIUM SERPL-MCNC: 1.8 MG/DL (ref 1.6–2.6)
MCH RBC QN AUTO: 30.9 PG (ref 27–31)
MCHC RBC AUTO-ENTMCNC: 33 G/DL (ref 32–36)
MCV RBC AUTO: 94 FL (ref 82–98)
MONOCYTES # BLD AUTO: 0.6 K/UL (ref 0.3–1)
MONOCYTES NFR BLD: 11.2 % (ref 4–15)
NEUTROPHILS # BLD AUTO: 3 K/UL (ref 1.8–7.7)
NEUTROPHILS NFR BLD: 58.2 % (ref 38–73)
NRBC BLD-RTO: 0 /100 WBC
PHOSPHATE SERPL-MCNC: 4.1 MG/DL (ref 2.7–4.5)
PLATELET # BLD AUTO: 158 K/UL (ref 150–450)
PMV BLD AUTO: 10.4 FL (ref 9.2–12.9)
POCT GLUCOSE: 93 MG/DL (ref 70–110)
POTASSIUM SERPL-SCNC: 4.3 MMOL/L (ref 3.5–5.1)
PROT SERPL-MCNC: 7.1 G/DL (ref 6–8.4)
PROTHROMBIN TIME: 15.1 SEC (ref 9–12.5)
PROTHROMBIN TIME: 15.2 SEC (ref 9–12.5)
RBC # BLD AUTO: 3.17 M/UL (ref 4.6–6.2)
SODIUM SERPL-SCNC: 138 MMOL/L (ref 136–145)
WBC # BLD AUTO: 5.08 K/UL (ref 3.9–12.7)

## 2023-04-14 PROCEDURE — D9220A PRA ANESTHESIA: ICD-10-PCS | Mod: HCNC,CRNA,, | Performed by: NURSE ANESTHETIST, CERTIFIED REGISTERED

## 2023-04-14 PROCEDURE — 25000003 PHARM REV CODE 250: Mod: HCNC | Performed by: INTERNAL MEDICINE

## 2023-04-14 PROCEDURE — 83735 ASSAY OF MAGNESIUM: CPT | Mod: HCNC | Performed by: STUDENT IN AN ORGANIZED HEALTH CARE EDUCATION/TRAINING PROGRAM

## 2023-04-14 PROCEDURE — 96365 THER/PROPH/DIAG IV INF INIT: CPT

## 2023-04-14 PROCEDURE — D9220A PRA ANESTHESIA: ICD-10-PCS | Mod: HCNC,ANES,, | Performed by: STUDENT IN AN ORGANIZED HEALTH CARE EDUCATION/TRAINING PROGRAM

## 2023-04-14 PROCEDURE — 80053 COMPREHEN METABOLIC PANEL: CPT | Mod: HCNC | Performed by: STUDENT IN AN ORGANIZED HEALTH CARE EDUCATION/TRAINING PROGRAM

## 2023-04-14 PROCEDURE — 99231 PR SUBSEQUENT HOSPITAL CARE,LEVL I: ICD-10-PCS | Mod: HCNC,,, | Performed by: INTERNAL MEDICINE

## 2023-04-14 PROCEDURE — 99232 PR SUBSEQUENT HOSPITAL CARE,LEVL II: ICD-10-PCS | Mod: HCNC,,, | Performed by: HOSPITALIST

## 2023-04-14 PROCEDURE — 99231 SBSQ HOSP IP/OBS SF/LOW 25: CPT | Mod: HCNC,,, | Performed by: INTERNAL MEDICINE

## 2023-04-14 PROCEDURE — 85610 PROTHROMBIN TIME: CPT | Mod: 91,HCNC | Performed by: STUDENT IN AN ORGANIZED HEALTH CARE EDUCATION/TRAINING PROGRAM

## 2023-04-14 PROCEDURE — 36415 COLL VENOUS BLD VENIPUNCTURE: CPT | Mod: HCNC | Performed by: STUDENT IN AN ORGANIZED HEALTH CARE EDUCATION/TRAINING PROGRAM

## 2023-04-14 PROCEDURE — 37000008 HC ANESTHESIA 1ST 15 MINUTES: Mod: HCNC

## 2023-04-14 PROCEDURE — 85730 THROMBOPLASTIN TIME PARTIAL: CPT | Mod: HCNC | Performed by: STUDENT IN AN ORGANIZED HEALTH CARE EDUCATION/TRAINING PROGRAM

## 2023-04-14 PROCEDURE — 82962 GLUCOSE BLOOD TEST: CPT | Mod: HCNC

## 2023-04-14 PROCEDURE — 99222 1ST HOSP IP/OBS MODERATE 55: CPT | Mod: HCNC,GC,, | Performed by: UROLOGY

## 2023-04-14 PROCEDURE — 85025 COMPLETE CBC W/AUTO DIFF WBC: CPT | Mod: HCNC | Performed by: STUDENT IN AN ORGANIZED HEALTH CARE EDUCATION/TRAINING PROGRAM

## 2023-04-14 PROCEDURE — D9220A PRA ANESTHESIA: Mod: HCNC,ANES,, | Performed by: STUDENT IN AN ORGANIZED HEALTH CARE EDUCATION/TRAINING PROGRAM

## 2023-04-14 PROCEDURE — 84100 ASSAY OF PHOSPHORUS: CPT | Mod: HCNC | Performed by: STUDENT IN AN ORGANIZED HEALTH CARE EDUCATION/TRAINING PROGRAM

## 2023-04-14 PROCEDURE — D9220A PRA ANESTHESIA: Mod: HCNC,CRNA,, | Performed by: NURSE ANESTHETIST, CERTIFIED REGISTERED

## 2023-04-14 PROCEDURE — 99232 SBSQ HOSP IP/OBS MODERATE 35: CPT | Mod: HCNC,,, | Performed by: HOSPITALIST

## 2023-04-14 PROCEDURE — G0378 HOSPITAL OBSERVATION PER HR: HCPCS | Mod: HCNC

## 2023-04-14 PROCEDURE — 37000009 HC ANESTHESIA EA ADD 15 MINS: Mod: HCNC

## 2023-04-14 PROCEDURE — 36415 COLL VENOUS BLD VENIPUNCTURE: CPT | Mod: HCNC | Performed by: INTERNAL MEDICINE

## 2023-04-14 PROCEDURE — 96366 THER/PROPH/DIAG IV INF ADDON: CPT

## 2023-04-14 PROCEDURE — 63600175 PHARM REV CODE 636 W HCPCS: Mod: HCNC | Performed by: STUDENT IN AN ORGANIZED HEALTH CARE EDUCATION/TRAINING PROGRAM

## 2023-04-14 PROCEDURE — 85730 THROMBOPLASTIN TIME PARTIAL: CPT | Mod: 91,HCNC | Performed by: INTERNAL MEDICINE

## 2023-04-14 PROCEDURE — 63600175 PHARM REV CODE 636 W HCPCS: Mod: HCNC | Performed by: NURSE ANESTHETIST, CERTIFIED REGISTERED

## 2023-04-14 PROCEDURE — 25000003 PHARM REV CODE 250: Mod: HCNC | Performed by: STUDENT IN AN ORGANIZED HEALTH CARE EDUCATION/TRAINING PROGRAM

## 2023-04-14 PROCEDURE — 99222 PR INITIAL HOSPITAL CARE,LEVL II: ICD-10-PCS | Mod: HCNC,GC,, | Performed by: UROLOGY

## 2023-04-14 PROCEDURE — 25000003 PHARM REV CODE 250: Mod: HCNC | Performed by: NURSE ANESTHETIST, CERTIFIED REGISTERED

## 2023-04-14 RX ORDER — WARFARIN 7.5 MG/1
7.5 TABLET ORAL ONCE
Status: COMPLETED | OUTPATIENT
Start: 2023-04-14 | End: 2023-04-14

## 2023-04-14 RX ORDER — PHENYLEPHRINE HYDROCHLORIDE 10 MG/ML
INJECTION INTRAVENOUS
Status: DISCONTINUED | OUTPATIENT
Start: 2023-04-14 | End: 2023-04-14

## 2023-04-14 RX ORDER — HEPARIN SODIUM,PORCINE/D5W 25000/250
0-40 INTRAVENOUS SOLUTION INTRAVENOUS CONTINUOUS
Status: DISCONTINUED | OUTPATIENT
Start: 2023-04-14 | End: 2023-04-15

## 2023-04-14 RX ORDER — SODIUM CHLORIDE 0.9 % (FLUSH) 0.9 %
3 SYRINGE (ML) INJECTION
Status: DISCONTINUED | OUTPATIENT
Start: 2023-04-14 | End: 2023-04-15 | Stop reason: HOSPADM

## 2023-04-14 RX ORDER — PROPOFOL 10 MG/ML
VIAL (ML) INTRAVENOUS CONTINUOUS PRN
Status: DISCONTINUED | OUTPATIENT
Start: 2023-04-14 | End: 2023-04-14

## 2023-04-14 RX ORDER — LIDOCAINE HYDROCHLORIDE 20 MG/ML
INJECTION INTRAVENOUS
Status: DISCONTINUED | OUTPATIENT
Start: 2023-04-14 | End: 2023-04-14

## 2023-04-14 RX ORDER — PROPOFOL 10 MG/ML
VIAL (ML) INTRAVENOUS
Status: DISCONTINUED | OUTPATIENT
Start: 2023-04-14 | End: 2023-04-14

## 2023-04-14 RX ORDER — LIDOCAINE HYDROCHLORIDE 20 MG/ML
SOLUTION OROPHARYNGEAL
Status: DISCONTINUED | OUTPATIENT
Start: 2023-04-14 | End: 2023-04-14

## 2023-04-14 RX ORDER — WARFARIN SODIUM 5 MG/1
5 TABLET ORAL
Status: DISCONTINUED | OUTPATIENT
Start: 2023-04-17 | End: 2023-04-15 | Stop reason: HOSPADM

## 2023-04-14 RX ADMIN — Medication 50 MCG/KG/MIN: at 02:04

## 2023-04-14 RX ADMIN — SODIUM CHLORIDE: 0.9 INJECTION, SOLUTION INTRAVENOUS at 02:04

## 2023-04-14 RX ADMIN — METOPROLOL SUCCINATE 25 MG: 25 TABLET, EXTENDED RELEASE ORAL at 09:04

## 2023-04-14 RX ADMIN — ISOSORBIDE MONONITRATE 60 MG: 60 TABLET, EXTENDED RELEASE ORAL at 09:04

## 2023-04-14 RX ADMIN — GLYCOPYRROLATE 0.2 MCG: 0.2 INJECTION, SOLUTION INTRAMUSCULAR; INTRAVENOUS at 02:04

## 2023-04-14 RX ADMIN — HEPARIN SODIUM AND DEXTROSE 9 UNITS/KG/HR: 10000; 5 INJECTION INTRAVENOUS at 07:04

## 2023-04-14 RX ADMIN — HEPARIN SODIUM AND DEXTROSE 12 UNITS/KG/HR: 10000; 5 INJECTION INTRAVENOUS at 10:04

## 2023-04-14 RX ADMIN — PHENYLEPHRINE HYDROCHLORIDE 200 MCG: 10 INJECTION INTRAVENOUS at 02:04

## 2023-04-14 RX ADMIN — WARFARIN SODIUM 7.5 MG: 7.5 TABLET ORAL at 04:04

## 2023-04-14 RX ADMIN — PROPOFOL 40 MG: 10 INJECTION, EMULSION INTRAVENOUS at 02:04

## 2023-04-14 RX ADMIN — Medication 324 MG: at 09:04

## 2023-04-14 RX ADMIN — ATORVASTATIN CALCIUM 80 MG: 40 TABLET, FILM COATED ORAL at 09:04

## 2023-04-14 RX ADMIN — LIDOCAINE HYDROCHLORIDE 50 MG: 20 INJECTION INTRAVENOUS at 02:04

## 2023-04-14 RX ADMIN — LIDOCAINE HYDROCHLORIDE 5 ML: 20 SOLUTION ORAL; TOPICAL at 02:04

## 2023-04-14 RX ADMIN — ALLOPURINOL 100 MG: 300 TABLET ORAL at 09:04

## 2023-04-14 NOTE — PLAN OF CARE
Problem: Adult Inpatient Plan of Care  Goal: Plan of Care Review  Outcome: Ongoing, Progressing  Goal: Patient-Specific Goal (Individualized)  Outcome: Ongoing, Progressing  Goal: Absence of Hospital-Acquired Illness or Injury  Outcome: Ongoing, Progressing  Goal: Optimal Comfort and Wellbeing  Outcome: Ongoing, Progressing  Goal: Readiness for Transition of Care  Outcome: Ongoing, Progressing     Problem: Diabetes Comorbidity  Goal: Blood Glucose Level Within Targeted Range  Outcome: Ongoing, Progressing     Problem: Fall Injury Risk  Goal: Absence of Fall and Fall-Related Injury  Outcome: Ongoing, Progressing     Problem: Fluid and Electrolyte Imbalance (Acute Kidney Injury/Impairment)  Goal: Fluid and Electrolyte Balance  Outcome: Ongoing, Progressing     Problem: Oral Intake Inadequate (Acute Kidney Injury/Impairment)  Goal: Optimal Nutrition Intake  Outcome: Ongoing, Progressing     Problem: Renal Function Impairment (Acute Kidney Injury/Impairment)  Goal: Effective Renal Function  Outcome: Ongoing, Progressing     Problem: Infection  Goal: Absence of Infection Signs and Symptoms  Outcome: Ongoing, Progressing

## 2023-04-14 NOTE — SUBJECTIVE & OBJECTIVE
Interval History: naeon.  INR subtherapeutic, started on heparin drip.  Plan for KIRSTEN today.  No complaints this morning.    Review of Systems   Constitutional: Negative.   HENT: Negative.     Eyes: Negative.    Cardiovascular:  Negative for chest pain.   Respiratory:  Negative for shortness of breath.    Endocrine: Negative.    Skin: Negative.    Musculoskeletal: Negative.    Gastrointestinal: Negative.    Genitourinary: Negative.    Psychiatric/Behavioral: Negative.     Objective:     Vital Signs (Most Recent):  Temp: 97.9 °F (36.6 °C) (04/14/23 1149)  Pulse: (!) 55 (04/14/23 1149)  Resp: 19 (04/14/23 1149)  BP: (!) 140/68 (04/14/23 1149)  SpO2: 98 % (04/14/23 1149)   Vital Signs (24h Range):  Temp:  [97.5 °F (36.4 °C)-98.5 °F (36.9 °C)] 97.9 °F (36.6 °C)  Pulse:  [53-67] 55  Resp:  [16-19] 19  SpO2:  [95 %-100 %] 98 %  BP: (114-140)/(56-86) 140/68     Weight: 73.5 kg (162 lb)  Body mass index is 26.96 kg/m².     SpO2: 98 %         Intake/Output Summary (Last 24 hours) at 4/14/2023 1411  Last data filed at 4/14/2023 0421  Gross per 24 hour   Intake 500 ml   Output 904 ml   Net -404 ml       Lines/Drains/Airways       Drain  Duration                  Urethral Catheter 04/13/23 1600 <1 day              Peripheral Intravenous Line  Duration                  Peripheral IV - Single Lumen 04/11/23 0823 20 G Left Antecubital 3 days                    Physical Exam  Constitutional:       Appearance: Normal appearance.   HENT:      Head: Normocephalic and atraumatic.      Mouth/Throat:      Mouth: Mucous membranes are moist.   Cardiovascular:      Rate and Rhythm: Normal rate and regular rhythm.   Musculoskeletal:         General: Normal range of motion.      Cervical back: Normal range of motion.      Right lower leg: No edema.      Left lower leg: No edema.   Skin:     General: Skin is warm.      Capillary Refill: Capillary refill takes less than 2 seconds.   Neurological:      General: No focal deficit present.       Mental Status: He is alert and oriented to person, place, and time.       Significant Labs: CMP   Recent Labs   Lab 04/13/23  0408 04/14/23  0703    138   K 4.4 4.3    109   CO2 18* 19*   GLU 82 94   BUN 69* 69*   CREATININE 3.2* 2.7*   CALCIUM 9.9 10.5   PROT 7.0 7.1   ALBUMIN 3.4* 3.5   BILITOT 0.4 0.4   ALKPHOS 62 68   AST 18 16   ALT 12 11   ANIONGAP 11 10    and CBC   Recent Labs   Lab 04/13/23  0408 04/14/23  0703   WBC 4.26 5.08   HGB 9.3* 9.8*   HCT 28.7* 29.7*    158       Significant Imaging:  reviewed

## 2023-04-14 NOTE — SUBJECTIVE & OBJECTIVE
Past Medical History:   Diagnosis Date    Anemia of chronic renal failure, stage 3 (moderate) 05/27/2015    Anticoagulant long-term use     Atherosclerosis of coronary artery bypass graft of native heart without angina pectoris 09/11/2012    3-27-18 [facility] Two vessel coronary artery disease.   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    Bilateral carotid artery disease 02/09/2017    Bleeding from the nose     Bleeding nose 03/21/2018    Cancer     Cataract     CHF (congestive heart failure)     CKD (chronic kidney disease) stage 3, GFR 30-59 ml/min 05/27/2015    Claudication of left lower extremity 09/17/2014    Colon polyp     Encounter for blood transfusion     Gastroesophageal reflux disease without esophagitis 03/19/2018    Gastrointestinal hemorrhage associated with intestinal diverticulosis 04/01/2018    Glaucoma     H/O mechanical aortic valve replacement 09/17/2014    History of gout 09/26/2012    Hyperparathyroidism due to renal insufficiency 07/27/2015    Internal hemorrhoid 04/03/2018    Long term current use of anticoagulant therapy 09/26/2012    Mechanical heart valve present     Metabolic acidosis with normal anion gap and bicarbonate losses 03/20/2018    Mixed hyperlipidemia 09/26/2012    NSTEMI (non-ST elevated myocardial infarction) 03/21/2018    Obesity, diabetes, and hypertension syndrome 02/23/2016    Paroxysmal atrial fibrillation 02/06/2023    PVD (peripheral vascular disease) 09/11/2012    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       Past Surgical History:   Procedure Laterality Date    BACK SURGERY      CARDIAC CATHETERIZATION      CARDIAC VALVE REPLACEMENT      CARDIAC VALVE  SURGERY      CARPAL TUNNEL RELEASE Right 05/19/2020    Procedure: RELEASE, CARPAL TUNNEL;  Surgeon: Rupesh Norris Jr., MD;  Location: [facility];  Service: Plastics;  Laterality: Right;    CATARACT EXTRACTION Left 11/13/2022        COLON SURGERY      COLONOSCOPY N/A 03/31/2017    Procedure: COLONOSCOPY;  Surgeon: Bruno Raymond MD;  Location: [facility] ENDO (4TH FLR);  Service: Endoscopy;  Laterality: N/A;  Patient's wife requesting date.    COLONOSCOPY N/A 04/03/2018    Procedure: COLONOSCOPY;  Surgeon: Bonifacio Pelletier MD;  Location: [facility] ENDO (2ND FLR);  Service: Endoscopy;  Laterality: N/A;    COLONOSCOPY N/A 08/13/2018    Procedure: COLONOSCOPY;  Surgeon: Kam Barba MD;  Location: [facility] ENDO (2ND FLR);  Service: Endoscopy;  Laterality: N/A;  2nd floor: PA pressure 49; hx of moderate-severe valve disease     per Coumadin clinic-Patient can hold 5 days with lovenox bridge       ok to schedule per Katarina    CORONARY ANGIOGRAPHY N/A 10/04/2021    Procedure: Left heart cath +/- peripheral angiogram;  Surgeon: Jose Ruiz MD;  Location: [facility] CATH LAB;  Service: Cardiology;  Laterality: N/A;    CORONARY ANGIOGRAPHY N/A 3/2/2023    Procedure: Angiogram, Coronary;  Surgeon: Devon Garnica MD;  Location: [facility] CATH LAB;  Service: Cardiology;  Laterality: N/A;    CORONARY ANGIOPLASTY      CORONARY ARTERY BYPASS GRAFT      CORONARY BYPASS GRAFT ANGIOGRAPHY  10/04/2021    Procedure: Bypass graft study;  Surgeon: Jose Ruiz MD;  Location: [facility] CATH LAB;  Service: Cardiology;;    CORONARY BYPASS GRAFT ANGIOGRAPHY  3/2/2023    Procedure: Bypass graft study;  Surgeon: Devon Garnica MD;  Location: [facility] CATH LAB;  Service: Cardiology;;    EYE SURGERY      HERNIA REPAIR      INTRAOCULAR PROSTHESES INSERTION Left 11/13/2022    Procedure: INSERTION, IOL PROSTHESIS;  Surgeon: Alia Mckeon MD;  Location: [facility] 1ST FLR;  Service: Ophthalmology;  Laterality: Left;    INTRAOCULAR PROSTHESES INSERTION  Right 2022    Procedure: INSERTION, IOL PROSTHESIS;  Surgeon: Alia Mckeon MD;  Location: [facility] OR [address] [place] [address];  Service: Ophthalmology;  Laterality: Right;    PHACOEMULSIFICATION OF CATARACT Left 2022    Procedure: PHACOEMULSIFICATION, CATARACT;  Surgeon: Alia Mckeon MD;  [facility];  Service: Ophthalmology;  Laterality: Left;    PHACOEMULSIFICATION OF CATARACT Right 2022    Procedure: PHACOEMULSIFICATION, CATARACT;  Surgeon: Alia Mckeon MD;  [facility];  Service: Ophthalmology;  Laterality: Right;    SPINE SURGERY      TRANSESOPHAGEAL ECHOCARDIOGRAPHY N/A 3/22/2023    Procedure: ECHOCARDIOGRAM, TRANSESOPHAGEAL;  Surgeon: [facility] Diagnostic Provider;  Location: [facility] EP LAB;  Service: Anesthesiology;  Laterality: N/A;    TRANSESOPHAGEAL ECHOCARDIOGRAPHY N/A 2023    Procedure: ECHOCARDIOGRAM, TRANSESOPHAGEAL;  Surgeon: [facility] Diagnostic Provider;  Location: [facility] EP LAB;  Service: Anesthesiology;  Laterality: N/A;    VASECTOMY         Review of patient's allergies indicates:   Allergen Reactions    Fosinopril      Intolerance- elevates potassium level      Losartan      Intolerance- elevates potassium level       Family History       Problem Relation (Age of Onset)    Alcohol abuse Father    Diabetes Brother    Heart disease Mother, Father, Brother, Sister    Heart failure Mother, Father, Brother    No Known Problems Sister, Maternal Grandmother, Maternal Grandfather, Paternal Grandmother, Paternal Grandfather, Maternal Aunt, Maternal Uncle, Paternal Aunt, Paternal Uncle            Tobacco Use    Smoking status: Former     Packs/day: 1.00     Years: 20.00     Pack years: 20.00     Types: Cigarettes     Quit date: 1980     Years since quittin.6     Passive exposure: Never    Smokeless tobacco: Never   Substance and Sexual Activity    Alcohol use: No    Drug use: No    Sexual activity: Not Currently     Partners: Female       Review of Systems    Constitutional:  Negative for activity change, appetite change, chills, fever and unexpected weight change.   Respiratory:  Negative for shortness of breath.    Cardiovascular:  Negative for chest pain.   Gastrointestinal:  Negative for abdominal pain, constipation, diarrhea, nausea and vomiting.   Genitourinary:  Negative for difficulty urinating, dysuria, flank pain and hematuria.   Musculoskeletal:  Negative for back pain.   Skin:  Negative for wound.   Neurological:  Negative for headaches.   Psychiatric/Behavioral:  Negative for confusion.      Objective:     Temp:  [97.5 °F (36.4 °C)-98.5 °F (36.9 °C)] 97.5 °F (36.4 °C)  Pulse:  [53-67] 54  Resp:  [16-19] 19  SpO2:  [95 %-100 %] 100 %  BP: (114-137)/(56-86) 130/59     Body mass index is 26.96 kg/m².           Drains       Drain  Duration                  Urethral Catheter 04/13/23 1600 <1 day                    Physical Exam  Vitals reviewed.   Constitutional:       General: He is not in acute distress.     Appearance: He is not diaphoretic.   HENT:      Head: Normocephalic and atraumatic.      Nose: Nose normal.   Eyes:      Conjunctiva/sclera: Conjunctivae normal.   Cardiovascular:      Rate and Rhythm: Normal rate.   Pulmonary:      Effort: Pulmonary effort is normal. No respiratory distress.   Abdominal:      General: There is no distension.      Palpations: Abdomen is soft. There is no mass.      Tenderness: There is no abdominal tenderness. There is no right CVA tenderness, left CVA tenderness, guarding or rebound.      Comments: Midline abdominal incision well healed. Bladder non-palpable. No lower abdominal mass palpable.    Genitourinary:     Comments: 16Fr indwelling Ku catheter draining clear yellow urine.   Musculoskeletal:         General: Normal range of motion.      Cervical back: Normal range of motion.   Skin:     General: Skin is dry.   Neurological:      Mental Status: He is alert.   Psychiatric:         Behavior: Behavior normal.          Thought Content: Thought content normal.       Significant Labs:    BMP:  Recent Labs   Lab 04/12/23  0441 04/13/23  0408 04/14/23  0703    139 138   K 4.4 4.4 4.3    110 109   CO2 18* 18* 19*   BUN 65* 69* 69*   CREATININE 3.2* 3.2* 2.7*   CALCIUM 10.0 9.9 10.5       CBC:  Recent Labs   Lab 04/12/23 0441 04/13/23  0408 04/14/23  0703   WBC 4.33 4.26 5.08   HGB 9.3* 9.3* 9.8*   HCT 29.3* 28.7* 29.7*    154 158       All pertinent labs results from the past 24 hours have been reviewed.    Significant Imaging:  All pertinent imaging results/findings from the past 24 hours have been reviewed.

## 2023-04-14 NOTE — ASSESSMENT & PLAN NOTE
Lab Results   Component Value Date    TROPONINI 0.164 (H) 12/29/2022     (H) 04/12/2023     Results for orders placed during the hospital encounter of 12/29/22  Echo  Interpretation Summary  · The left ventricle is normal in size with mild concentric hypertrophy and mildly decreased systolic function.  · The estimated ejection fraction is 48%.  · There are segmental left ventricular wall motion abnormalities.  · There is abnormal septal wall motion.  · Grade III left ventricular diastolic dysfunction.  · Severe left atrial enlargement.  · Normal right ventricular size with normal right ventricular systolic function.  · Mild right atrial enlargement.  · There is a mechanical aortic valve present.  · The aortic valve mean gradient is 17 mmHg with a dimensionless index of 0.38.  · Moderate to moderate -severe ( 2-3+) MR  · Moderate tricuspid regurgitation.  · Normal central venous pressure (3 mmHg).  · The estimated PA systolic pressure is 62 mmHg.  · There is at least moderate pulmonary hypertension.  - appears compensated on exam  - hold home bumex per nephro for renal function  - hold ACE for JIM  - Cardiac diet, fluid restriction at 1500 cc with strict I/Os and daily standing weights  - Maintain Mg >2, K >4

## 2023-04-14 NOTE — SUBJECTIVE & OBJECTIVE
Interval history: NAEON. Renal function improved. Good UOP with morrison. Urine microscopy bland. KIRSTEN for today per primary.    Review of Systems   Constitutional:  Negative for chills, fatigue and fever.   HENT:  Negative for sore throat and trouble swallowing.    Eyes:  Negative for pain and visual disturbance.   Respiratory:  Positive for shortness of breath. Negative for cough and chest tightness.    Cardiovascular:  Negative for chest pain and leg swelling.   Gastrointestinal:  Positive for diarrhea (intermittent). Negative for abdominal pain, constipation, nausea and vomiting.   Endocrine: Negative for polyuria.   Genitourinary:  Negative for dysuria, flank pain and frequency.   Musculoskeletal:  Negative for arthralgias and gait problem.   Neurological:  Negative for dizziness, numbness and headaches.   Psychiatric/Behavioral:  Negative for agitation and confusion.    Objective:     Vital Signs (Most Recent):  Temp: 97.5 °F (36.4 °C) (04/14/23 0720)  Pulse: (!) 54 (04/14/23 0720)  Resp: 19 (04/14/23 0720)  BP: (!) 130/59 (04/14/23 0720)  SpO2: 100 % (04/14/23 0720)   Vital Signs (24h Range):  Temp:  [97.5 °F (36.4 °C)-98.5 °F (36.9 °C)] 97.5 °F (36.4 °C)  Pulse:  [53-67] 54  Resp:  [16-19] 19  SpO2:  [95 %-100 %] 100 %  BP: (114-137)/(56-86) 130/59     Weight: 73.5 kg (162 lb) (04/11/23 0821)  Body mass index is 26.96 kg/m².  Body surface area is 1.84 meters squared.    I/O last 3 completed shifts:  In: 620 [P.O.:620]  Out: 904 [Urine:904]    Physical Exam  Vitals reviewed.   Constitutional:       General: He is not in acute distress.     Appearance: Normal appearance. He is not ill-appearing.   HENT:      Head: Normocephalic and atraumatic.      Right Ear: External ear normal.      Left Ear: External ear normal.      Nose: Nose normal.      Mouth/Throat:      Pharynx: Oropharynx is clear. No posterior oropharyngeal erythema.   Eyes:      General: No scleral icterus.     Conjunctiva/sclera: Conjunctivae normal.    Cardiovascular:      Rate and Rhythm: Normal rate and regular rhythm.      Pulses: Normal pulses.      Heart sounds: Murmur heard.      Comments: JVD  to ear lobe  Mechanical valve sound  Pulmonary:      Effort: Pulmonary effort is normal.      Breath sounds: Rales present.   Abdominal:      General: There is no distension.      Palpations: Abdomen is soft.      Tenderness: There is no abdominal tenderness.   Musculoskeletal:         General: Normal range of motion.      Cervical back: Normal range of motion.      Right lower leg: No edema.      Left lower leg: No edema.   Skin:     General: Skin is warm and dry.   Neurological:      General: No focal deficit present.      Mental Status: He is alert and oriented to person, place, and time.   Psychiatric:         Mood and Affect: Mood normal.         Behavior: Behavior normal.       Significant Labs:  CMP:   Recent Labs   Lab 04/14/23  0703   GLU 94   CALCIUM 10.5   ALBUMIN 3.5   PROT 7.1      K 4.3   CO2 19*      BUN 69*   CREATININE 2.7*   ALKPHOS 68   ALT 11   AST 16   BILITOT 0.4       All labs within the past 24 hours have been reviewed.    Significant Imaging:  Reviewed

## 2023-04-14 NOTE — CONSULTS
[facility] - Cardiology Stepdown  Urology  Consult Note    Patient Name: Dariel Urbina  MRN: [id number]  Admission Date: 4/11/2023  Hospital Length of Stay: 1   Code Status: Full Code   Attending Provider: Erika Carmen MD   Consulting Provider: Bert Alvarez MD  Primary Care Physician: Devon Langston MD  Principal Problem:Supratherapeutic INR    Inpatient consult to Urology  Consult performed by: Bert Alvarez MD  Consult ordered by: Alessio Wray MD          Subjective:     HPI:  Patient is an 83 yo M with PMH of HTN, DMII, CAD s/p CABG in 90s, s/p aortic valve replacement on warfarin, combined HF (grade III diastolic dysfunction, EF 45%), mod/severe MR (evaling for mitral clip), CKD4 admitted due to elevated INR. He is being evaluated for mitral clip and was scheduled for KIRSTEN however found to have elevated INR so was hospitalized. Also noted to have elevated Cr to 3.5 on admission from baseline of 2-2.2. He was found to be dehydrated and his Cr has improved to 2.7 today. Patient was voiding spontaneously on admission. No bladder scan has been performed. Ku was ordered for I/O's and was placed yesterday afternoon. He denies any Urologic history. He reports voiding 5-6x daily with strong stream and feels as though he empties completely. He denies fevers, chills, nausea, vomiting, difficulty voiding, dysuria, hematuria.     Renal US obtained 4/13/23 shows medical renal disease and small simple cyst on the right, no renal masses or hydro. Bladder is decompressed around the Ku catheter. There is an indeterminate lesion anterior to the bladder which is not well defined, could be bowel. Previous abdominal imaging showed no concerning  findings. WBC 5. Cr 2.7 (3.5 on admission, ~2.0-2.2 baseline). Clean catch UA nitrite negative, 1 RBC, 3 WBC, rare bacteria.         Past Medical History:   Diagnosis Date    Anemia of chronic renal failure, stage 3 (moderate) 05/27/2015    Anticoagulant  long-term use     Atherosclerosis of coronary artery bypass graft of native heart without angina pectoris 09/11/2012    3-27-18 [facility] Two vessel coronary artery disease.   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    Bilateral carotid artery disease 02/09/2017    Bleeding from the nose     Bleeding nose 03/21/2018    Cancer     Cataract     CHF (congestive heart failure)     CKD (chronic kidney disease) stage 3, GFR 30-59 ml/min 05/27/2015    Claudication of left lower extremity 09/17/2014    Colon polyp     Encounter for blood transfusion     Gastroesophageal reflux disease without esophagitis 03/19/2018    Gastrointestinal hemorrhage associated with intestinal diverticulosis 04/01/2018    Glaucoma     H/O mechanical aortic valve replacement 09/17/2014    History of gout 09/26/2012    Hyperparathyroidism due to renal insufficiency 07/27/2015    Internal hemorrhoid 04/03/2018    Long term current use of anticoagulant therapy 09/26/2012    Mechanical heart valve present     Metabolic acidosis with normal anion gap and bicarbonate losses 03/20/2018    Mixed hyperlipidemia 09/26/2012    NSTEMI (non-ST elevated myocardial infarction) 03/21/2018    Obesity, diabetes, and hypertension syndrome 02/23/2016    Paroxysmal atrial fibrillation 02/06/2023    PVD (peripheral vascular disease) 09/11/2012    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       Past Surgical History:   Procedure Laterality Date    BACK SURGERY      CARDIAC CATHETERIZATION      CARDIAC VALVE REPLACEMENT      CARDIAC VALVE SURGERY      CARPAL TUNNEL RELEASE Right 05/19/2020    Procedure: RELEASE, CARPAL TUNNEL;  Surgeon: Rupesh Norris  MD Delmi;  Location: [facility];  Service: Plastics;  Laterality: Right;    CATARACT EXTRACTION Left 11/13/2022        COLON SURGERY      COLONOSCOPY N/A 03/31/2017    Procedure: COLONOSCOPY;  Surgeon: Bruno Raymond MD;  Location: [facility] (4TH FLR);  Service: Endoscopy;  Laterality: N/A;  Patient's wife requesting date.    COLONOSCOPY N/A 04/03/2018    Procedure: COLONOSCOPY;  Surgeon: Bonifacio Pelletier MD;  Location: [facility] ENDO (2ND FLR);  Service: Endoscopy;  Laterality: N/A;    COLONOSCOPY N/A 08/13/2018    Procedure: COLONOSCOPY;  Surgeon: Kam Barba MD;  Location: [facility] ENDO (2ND FLR);  Service: Endoscopy;  Laterality: N/A;  2nd floor: PA pressure 49; hx of moderate-severe valve disease     per Coumadin clinic-Patient can hold 5 days with lovenox bridge       ok to schedule per Katarina    CORONARY ANGIOGRAPHY N/A 10/04/2021    Procedure: Left heart cath +/- peripheral angiogram;  Surgeon: Jose Ruiz MD;  Location: [facility] CATH LAB;  Service: Cardiology;  Laterality: N/A;    CORONARY ANGIOGRAPHY N/A 3/2/2023    Procedure: Angiogram, Coronary;  Surgeon: Devon Garnica MD;  Location: [facility] CATH LAB;  Service: Cardiology;  Laterality: N/A;    CORONARY ANGIOPLASTY      CORONARY ARTERY BYPASS GRAFT      CORONARY BYPASS GRAFT ANGIOGRAPHY  10/04/2021    Procedure: Bypass graft study;  Surgeon: Jose Ruiz MD;  Location: [facility] CATH LAB;  Service: Cardiology;;    CORONARY BYPASS GRAFT ANGIOGRAPHY  3/2/2023    Procedure: Bypass graft study;  Surgeon: Devon Garnica MD;  Location: [facility] CATH LAB;  Service: Cardiology;;    EYE SURGERY      HERNIA REPAIR      INTRAOCULAR PROSTHESES INSERTION Left 11/13/2022    Procedure: INSERTION, IOL PROSTHESIS;  Surgeon: Alia Mckeon MD;  Location: [address];  Service: Ophthalmology;  Laterality: Left;    INTRAOCULAR PROSTHESES INSERTION Right 12/04/2022    Procedure: INSERTION, IOL PROSTHESIS;  Surgeon: Alia Mckeon MD;  Location: [address];   Service: Ophthalmology;  Laterality: Right;    PHACOEMULSIFICATION OF CATARACT Left 2022    Procedure: PHACOEMULSIFICATION, CATARACT;  Surgeon: Alia Mckeon MD;  Location: [address];  Service: Ophthalmology;  Laterality: Left;    PHACOEMULSIFICATION OF CATARACT Right 2022    Procedure: PHACOEMULSIFICATION, CATARACT;  Surgeon: Alia Mckeon MD;  Location: [address];  Service: Ophthalmology;  Laterality: Right;    SPINE SURGERY      TRANSESOPHAGEAL ECHOCARDIOGRAPHY N/A 3/22/2023    Procedure: ECHOCARDIOGRAM, TRANSESOPHAGEAL;  Surgeon: [facility] Diagnostic Provider;  Location: [facility] EP LAB;  Service: Anesthesiology;  Laterality: N/A;    TRANSESOPHAGEAL ECHOCARDIOGRAPHY N/A 2023    Procedure: ECHOCARDIOGRAM, TRANSESOPHAGEAL;  Surgeon: [facility] Diagnostic Provider;  Location: [facility] EP LAB;  Service: Anesthesiology;  Laterality: N/A;    VASECTOMY         Review of patient's allergies indicates:   Allergen Reactions    Fosinopril      Intolerance- elevates potassium level      Losartan      Intolerance- elevates potassium level       Family History       Problem Relation (Age of Onset)    Alcohol abuse Father    Diabetes Brother    Heart disease Mother, Father, Brother, Sister    Heart failure Mother, Father, Brother    No Known Problems Sister, Maternal Grandmother, Maternal Grandfather, Paternal Grandmother, Paternal Grandfather, Maternal Aunt, Maternal Uncle, Paternal Aunt, Paternal Uncle            Tobacco Use    Smoking status: Former     Packs/day: 1.00     Years: 20.00     Pack years: 20.00     Types: Cigarettes     Quit date: 1980     Years since quittin.6     Passive exposure: Never    Smokeless tobacco: Never   Substance and Sexual Activity    Alcohol use: No    Drug use: No    Sexual activity: Not Currently     Partners: Female       Review of Systems   Constitutional:  Negative for activity change, appetite change, chills, fever and unexpected weight change.   Respiratory:   Negative for shortness of breath.    Cardiovascular:  Negative for chest pain.   Gastrointestinal:  Negative for abdominal pain, constipation, diarrhea, nausea and vomiting.   Genitourinary:  Negative for difficulty urinating, dysuria, flank pain and hematuria.   Musculoskeletal:  Negative for back pain.   Skin:  Negative for wound.   Neurological:  Negative for headaches.   Psychiatric/Behavioral:  Negative for confusion.      Objective:     Temp:  [97.5 °F (36.4 °C)-98.5 °F (36.9 °C)] 97.5 °F (36.4 °C)  Pulse:  [53-67] 54  Resp:  [16-19] 19  SpO2:  [95 %-100 %] 100 %  BP: (114-137)/(56-86) 130/59     Body mass index is 26.96 kg/m².           Drains       Drain  Duration                  Urethral Catheter 04/13/23 1600 <1 day                    Physical Exam  Vitals reviewed.   Constitutional:       General: He is not in acute distress.     Appearance: He is not diaphoretic.   HENT:      Head: Normocephalic and atraumatic.      Nose: Nose normal.   Eyes:      Conjunctiva/sclera: Conjunctivae normal.   Cardiovascular:      Rate and Rhythm: Normal rate.   Pulmonary:      Effort: Pulmonary effort is normal. No respiratory distress.   Abdominal:      General: There is no distension.      Palpations: Abdomen is soft. There is no mass.      Tenderness: There is no abdominal tenderness. There is no right CVA tenderness, left CVA tenderness, guarding or rebound.      Comments: Midline abdominal incision well healed. Bladder non-palpable. No lower abdominal mass palpable.    Genitourinary:     Comments: 16Fr indwelling Ku catheter draining clear yellow urine.   Musculoskeletal:         General: Normal range of motion.      Cervical back: Normal range of motion.   Skin:     General: Skin is dry.   Neurological:      Mental Status: He is alert.   Psychiatric:         Behavior: Behavior normal.         Thought Content: Thought content normal.       Significant Labs:    BMP:  Recent Labs   Lab 04/12/23  0441 04/13/23  1525  04/14/23  0703    139 138   K 4.4 4.4 4.3    110 109   CO2 18* 18* 19*   BUN 65* 69* 69*   CREATININE 3.2* 3.2* 2.7*   CALCIUM 10.0 9.9 10.5       CBC:  Recent Labs   Lab 04/12/23  0441 04/13/23  0408 04/14/23  0703   WBC 4.33 4.26 5.08   HGB 9.3* 9.3* 9.8*   HCT 29.3* 28.7* 29.7*    154 158       All pertinent labs results from the past 24 hours have been reviewed.    Significant Imaging:  All pertinent imaging results/findings from the past 24 hours have been reviewed.                      Assessment and Plan:     Acute renal failure superimposed on stage 3 chronic kidney disease  83 yo M with multiple medical morbidities admitted for elevated INR and found to have JIM on CKD. Urology consulted for indeterminate lesion seen anterior to the bladder on US.    - No indication for inpatient Urologic intervention.  - Patient's JIM seems to be resolving. Low suspicion that his JIM is obstructive in nature, particularly given high BUN:Cr ratio.  - Trend Cr.  - Ku management per primary.  - Ultrasound shows no concerning  lesions, particularly no lesions within the bladder. The solid focus anterior to the bladder was non-palpable on exam, could represent bowel. No further workup necessary from Urology standpoint.        VTE Risk Mitigation (From admission, onward)           Ordered     warfarin (COUMADIN) tablet 5 mg  Once per day on Mon Wed Fri Sat         04/14/23 1003     warfarin (COUMADIN) tablet 7.5 mg  Once per day on Sun Tue Thu         04/13/23 1026     warfarin (COUMADIN) tablet 7.5 mg  Once         04/14/23 1003     heparin 25,000 units in dextrose 5% (100 units/ml) IV bolus from bag - ADDITIONAL PRN BOLUS - 60 units/kg  As needed (PRN)        Question:  Heparin Infusion Adjustment (DO NOT MODIFY ANSWER)  Answer:  [email or web address]\epic\Images\Pharmacy\HeparinInfusions\heparin LOW INTENSITY nomogram for [facility] [id number]    04/14/23 0857     heparin 25,000 units in dextrose 5% (100 units/ml) IV  bolus from bag - ADDITIONAL PRN BOLUS - 30 units/kg  As needed (PRN)        Question:  Heparin Infusion Adjustment (DO NOT MODIFY ANSWER)  Answer:  [email or web address]\epic\Images\Pharmacy\HeparinInfusions\heparin LOW INTENSITY nomogram for [facility] [id number]    04/14/23 0857     heparin 25,000 units in dextrose 5% (100 units/ml) IV bolus from bag INITIAL BOLUS  Once        Question:  Heparin Infusion Adjustment (DO NOT MODIFY ANSWER)  Answer:  [email or web address]\epic\Images\Pharmacy\HeparinInfusions\heparin LOW INTENSITY nomogram for [facility] [id number].pdf    04/14/23 0857     heparin 25,000 units in dextrose 5% 250 mL (100 units/mL) infusion LOW INTENSITY nomogram - [facility]  Continuous        Question Answer Comment   Heparin Infusion Adjustment (DO NOT MODIFY ANSWER) [email or web address]\epic\Images\Pharmacy\HeparinInfusions\heparin LOW INTENSITY nomogram for [facility] [id number]    Begin at (in units/kg/hr) 12        04/14/23 0857     Reason for No Pharmacological VTE Prophylaxis  Once        Question:  Reasons:  Answer:  Risk of Bleeding    04/11/23 1448     IP VTE HIGH RISK PATIENT  Once         04/11/23 1448     Place sequential compression device  Until discontinued         04/11/23 1448                    Thank you for your consult. I will sign off. Please contact us if you have any additional questions.    Bert Alvarez MD  Urology  [facility] - Cardiology Stepdown    Patient was seen and examined at the bedside.  He has no suprapubic mass.  Given the previous imaging (CT scan in September 2022) and the present ultrasound both of which the images and reports were reviewed, no intervention needed.  Discussed the findings with the patient and his wife.

## 2023-04-14 NOTE — NURSING
Aptt came 85.8. Per Mar monogram hold infusion for 1 hour, until 1915, then decrease infusion by 3 units. From 12 U/kg to 9 U/kg.

## 2023-04-14 NOTE — ASSESSMENT & PLAN NOTE
Baseline Cr 1.8-2.2.    Lab Results   Component Value Date    BUN 69 (H) 04/14/2023    CREATININE 2.7 (H) 04/14/2023   - nephro consulted  - renal ultrasound without hydro, however does show nonobstructive nonspecific bladder mass  - hold home lisinopril  - strict I/O  - CMP qd, trend Cr  - renally dose all medications  - avoid nephrotoxic agents, NSAIDs, IV contrast, ACE/ARB  - holding bumex today

## 2023-04-14 NOTE — ASSESSMENT & PLAN NOTE
83 yo M with multiple medical morbidities admitted for elevated INR and found to have JIM on CKD. Urology consulted for indeterminate lesion seen anterior to the bladder on US.    - No indication for inpatient Urologic intervention.  - Patient's JIM seems to be resolving. Low suspicion that his JIM is obstructive in nature, particularly given high BUN:Cr ratio.  - Trend Cr.  - Ku management per primary.  - Ultrasound shows no concerning  lesions, particularly no lesions within the bladder. The solid focus anterior to the bladder was non-palpable on exam, could represent bowel. No further workup necessary from Urology standpoint.   [facility] [place]   [facility]     Patient: Milton Constantino Date: 2022     : 1965 Attending: Boy Chacon MD   56 year old male      Reason for Admission: Severe hyperglycemia.   Reason for Consult / Transfer of Care: Heart failure management     History of Present Illness:  Milton Constantino is a 56 year old year old male with a PMH significant for ICMP, chronic systolic HF, ICD, AFIB s/p DCCV 2022,  HLD, HTN and DM2.  He presented to AHF clinic in f/u on 2022 (last in clinic in ; had been following with Dr. Guerin and Dr. Alonzo). Recent hospitalizations in : [place] in February for decompensated HF (diuresed 13L with assistance of Milrinone) & [facility] in April with syncope (weaned off multiple meds / on midodrine).     History of BTT evaluation in , not completed d/t patients wishes - not interested in pursing advanced options at that time. During clinic visit on 2022 patient endorsed interest and insurance authorization obtained for BTT evaluation.     Ultimately sent to ED w/blood glucose >900 and admitted for further evaluation and care.     At time of consultation severe hyperglycemia has resolved; felt fine yesterday overall and feeling good today. Was nervous yesterday after hearing how elevated his glucose was - unsure why he got so high - did have pancakes with syrup prior to lab drawn. Noted plans for DC home soon (possibly today or tomorrow).     He endorses ~4 lb weight gain since last hospital DC; has occasional CORTEZ and has chronic LE swelling (thinks it's currently minimal), denies chest pain, abdominal distention/bloating, early satiety, dizziness, lightheadedness or palpitations. Endorses compliance with medications and dietary restrictions.     Past Medical History:   Diagnosis Date   • CAD (coronary artery disease)    • Cardiomyopathy, dilated (CMS/HCC)    • CHF (congestive heart failure) (CMS/HCC)    •  Chronic kidney disease    • COVID-19    • Depression 03/21/2016   • Diabetes mellitus (CMS/[facility])    • Erectile dysfunction    • Essential (primary) hypertension    • Financial difficulties    • Hemorrhoids    • History of noncompliance with medical treatment     due to lack of insurance   • History of use of hearing aid in both ears    • [place] (hard of hearing)    • MI (myocardial infarction) (CMS/[facility])      2002,12/2013, S/P multiple PCI   • Other and unspecified hyperlipidemia    • Paroxysmal atrial fibrillation (CMS/[facility])    • SOB (shortness of breath)    • Uses roller walker     roller walker w/seat   • Wears dentures     full upper   • Wears hearing aid in both ears    • Wears reading eyeglasses      Past Surgical History:   Procedure Laterality Date   • Cardiac catherization  07/01/2014   • Cardiac catherization  12/26/2013   • Cardioversion  10/04/2021   • Cardioversion  02/11/2022   • Echo m-mode/2d/doppler (routine)  02/08/2019   • Echo m-mode/2d/doppler (routine)  06/14/2017   • Echo m-mode/2d/doppler (routine)  05/04/2018   • Extracapsular cataract removal w insert io lens prosth wo ecp Left 02/01/2021    zcboo +21.0 Dr Huseyin Doe   • Extracapsular cataract removal w insert io lens prosth wo ecp Right 02/15/2021    zcboo +20.5    • Icd implant  10/21/2014    Single lead- [facility]   • Mouth surgery  07/06/2016    REMOVAL OF ALL UPPER TEETH AND TEETH #17 AND 32    • Ptca  12/27/2013    Stent RCA   • Right and left heart cath  09/16/2019   • Right heart cath  02/21/2019   • Right heart catheterization  03/19/2021     Social History     Socioeconomic History   • Marital status: Single     Spouse name: Not on file   • Number of children: 0   • Years of education: Not on file   • Highest education level: Not on file   Occupational History     Employer: [employer]     Comment: part time   • Occupation: unemployed     Comment: applying for disability   Tobacco Use   • Smoking status: Never Smoker    • Smokeless tobacco: Never Used   Vaping Use   • Vaping Use: never used   Substance and Sexual Activity   • Alcohol use: No     Alcohol/week: 0.0 standard drinks   • Drug use: No   • Sexual activity: Not on file   Other Topics Concern   • Not on file   Social History Narrative    Lives with sister Annie     Social Determinants of Health     Financial Resource Strain: Low Risk    • Social Determinants: Financial Resource Strain: None   Food Insecurity: No Food Insecurity   • Social Determinants: Food Insecurity: Never   Transportation Needs: No Transportation Needs   • Lack of Transportation (Medical): No   • Lack of Transportation (Non-Medical): No   Physical Activity: Insufficiently Active   • Days of Exercise per Week: 2 days   • Minutes of Exercise per Session: 10 min   Stress: Medium Risk   • Social Determinants: Stress: Somewhat   Social Connections: Socially Integrated   • Social Determinants: Social Connections: 5 or more times a week   Intimate Partner Violence: Not At Risk   • Social Determinants: Intimate Partner Violence Past Fear: No   • Social Determinants: Intimate Partner Violence Current Fear: No     Family History   Problem Relation Age of Onset   • Stroke Mother    • Diabetes Father    • Hypertension Father    • Heart disease Father    • Asthma Son    • Heart disease Sister    • Heart disease Brother      Review of Systems: Positives in HPI.  A comprehensive 10 point + review of systems is otherwise negative.    Medications/Infusions: Reviewed     Rhythm: Sinus rhythm / PVCs    Arrythmias: None noted    Vital Last Value 24 Hour Range   Temperature 97.2 °F (36.2 °C) Temp  Min: 97.2 °F (36.2 °C)  Max: 97.9 °F (36.6 °C)   Pulse 69 Pulse  Min: 60  Max: 72   Respiratory 16 Resp  Min: 15  Max: 18   Blood Pressure 96/68 BP  Min: 94/61  Max: 122/72   Pulse Oximetry 99 % SpO2  Min: 99 %  Max: 100 %     Vital Today Admitted   Weight 72.3 kg (159 lb 6.3 oz) Weight: 75.1 kg (165 lb 9.1 oz)   Height N/A  Height: 5' 5\" (165.1 cm)   BMI (body mass index) N/A BMI (Calculated): 26.52     Weight over the past 48 Hours:  Patient Vitals for the past 48 hrs:   Weight   04/29/22 1743 75.1 kg (165 lb 9.1 oz)   04/30/22 0705 72.3 kg (159 lb 6.3 oz)      Intake/Output:  I/O this shift:  In: 600 [P.O.:600]  Out: 250 [Urine:250]  I/O last 3 completed shifts:  In: 360 [P.O.:360]  Out: 460 [Urine:460]    Intake/Output Summary (Last 24 hours) at 4/30/2022 1356  Last data filed at 4/30/2022 1000  Gross per 24 hour   Intake 960 ml   Output 710 ml   Net 250 ml     Physical Assessment:  General:    No acute distress   Incision:    n/a   EENT:    PERRL   Oral Mucosa:    Moist   Neck:   Trachea midline, mild JVD appreciated    Lungs:     Clear to auscultation   Cardiovascular:   RRR, S1/S2 without murmur, +1 BLE edema    Abdomen:     Soft, non-tender, mildly-distended, BS+ x4   Pulses:   Normal bilaterally   Skin:   Warm core/periphery   Neurologic:   A&Ox3. No focal deficits      Laboratory Results:  Lab Results   Component Value Date    WBC 4.0 (L) 04/30/2022    HGB 12.6 (L) 04/30/2022    HCT 38.9 (L) 04/30/2022     04/30/2022    BUN 23 (H) 04/30/2022    CREATININE 1.63 (H) 04/30/2022    SODIUM 136 04/30/2022    POTASSIUM 3.7 04/30/2022    CO2 32 04/30/2022    MG 2.7 (H) 04/30/2022    BILIRUBIN 1.6 (H) 04/30/2022    INR 1.3 01/10/2022    PTT 29 01/10/2022    ALKPT 89 04/30/2022    AST 29 04/30/2022    GPT 41 04/30/2022    ALBUMIN 3.2 (L) 04/30/2022    GLUCOSE 165 (H) 04/30/2022    TSH 2.313 03/17/2022     ECHO 3/28/2022  Echo Limited & f/u without contrast.  Moderately increased left ventricular chamber size.  Left ventricular ejection fraction; 20 %.  Global left ventricular hypokinesis , worse inferoseptally.  MV E/e' septal = 18.  Moderately increased right ventricular chamber size.  Moderately/severely decreased right ventricular systolic function.  Catheter wire/s visualized in the right heart.  Pulmonary artery systolic  pressure 40-45 mmHg.  Severely increased left atrial chamber size.  Severely increased right atrial chamber size.  At least moderate mitral valve regurgitation.  Severe tricuspid valve regurgitation.  Dilated IVC with decreased respiratory variation.  No pericardial effusion.  Compared to prior study, dated 12/14/21 ,PA pressure is somewhat lower, now at lower HR, BP, weight.    CPX 2/12/2020  Peak VO2: 11.8 ml/kg/min   Peak VO2 Predicted: 38 %   Beta Blocker: Yes   Peak RER: 1.17   Exercise Effort: good   VE/VCO2 slope: 27.8   Oxygen pulse: 6.5   Oxygen Saturation: Rest 100 % & Peak 98 %     NM Viability Study 2019 Patient declined     RHC / LHC 9/16/2019 (83 kg / 183lbs):  Arterial Sat= 90.0%  AO= 104/60/76 mmHg  RA= 10 mmHg     RV= 48/12 mmHg             PAP= 44/23/30 mmHg   Sat= 54.0%  PCWP= 21 mmHg  Intracardiac pressures noted to have respiratory variation  TPG= 9 mmHg                PVR= 1.87 MORALES (Jennie)  JENNIE Cardiac output (L/min)/cardiac index (L/min/m2) 4.79/2.50  Prox RCA to Mid RCA lesion with 70% stenosis.  Mid RCA lesion with 60% stenosis.  Ost LAD lesion with 40% stenosis.  Ost 1st Diag lesion with 40% stenosis.  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 Furthermore, the combination of an afterload increasing agent and a negative inotrope may precipitate heart  failure decompensation.   -Off B-Blocker, ACEi/ARB/ARNI d/t blood pressure intolerance   -BP will not tolerate addition of hydralazine / nitrate    -SGLT2i: Jardiance      -Daily ASA / Plavix / statin    -Daily standing weights and strict I&O   -Recommend low sodium / fluid restricted diet    -Functional ICD    Advanced Heart Failure Therapies   -BTT eval insurance auth obtained; will arrange outpatient (will not hold up DC this weekend)   -Potential barriers include: Uncontrolled DM, undetermined support, patient preference    Type 2 Diabetes with Severe Hyperglycemia (improved)- Recommend close f/u with endocrinology    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 Mariee; please call with any questions or concerns.     Mima MITCHELL  Advanced Heart Failure / [facility] / Transplant / Pulmonary HTN   Pager [phone]  On-call physician available [phone]       CARDIOLOGY ATTENDING NOTE:  I was present with STEPHEN Schmitz, and I personally participated in the history, physical examination, review of studies, and assessment and plan as documented above. I have reviewed and edited and agree with the findings and plan of action as noted with the following additional comments:      Plan:   - Blood glucose management per primary team   - Continue recently increased dose of torsemide 40 mg BID   - Follow up in AHF Clinic.  We will arrange for a scheduled AHFT evaluation in the near future.   - Patient is ok for discharge from a HF standpoint when his blood glucose is adequately controlled.   - Rest of plan as above     Please feel free to call me if there are any further questions regarding this patient.     Kirill Mariee MD, PhD  Cardiology - Heart Failure & Transplant  [facility]

## 2023-04-14 NOTE — ASSESSMENT & PLAN NOTE
- renal ultrasound without hydro, however does show nonobstructive nonspecific bladder mass  - urology consulted

## 2023-04-14 NOTE — TRANSFER OF CARE
"Anesthesia Transfer of Care Note    Patient: Dariel Urbina    Procedure(s) Performed: Procedure(s) (LRB):  Transesophageal echo (KIRSTEN) intra-procedure log documentation (N/A)    Patient location: [facility]    Anesthesia Type: general    Transport from OR: Transported from OR on 2-3 L/min O2 by NC with adequate spontaneous ventilation    Post pain: adequate analgesia    Post assessment: no apparent anesthetic complications    Post vital signs: stable    Level of consciousness: awake and lethargic    Nausea/Vomiting: no nausea/vomiting    Complications: none    Transfer of care protocol was followed      Last vitals:   Visit Vitals  BP (!) 140/68   Pulse (!) 55   Temp 36.6 °C (97.9 °F) (Oral)   Resp 19   Ht 5' 5" (1.651 m)   Wt 73.5 kg (162 lb)   SpO2 98%   BMI 26.96 kg/m²     "

## 2023-04-14 NOTE — ASSESSMENT & PLAN NOTE
Presented for outpt KIRSTEN for mitralclip eval, however INR supratherapeutic (4.9) on presentation.  - will hold warfarin, trend INR until therapeutic  - keep on tele  - KIRSTEN today

## 2023-04-14 NOTE — ASSESSMENT & PLAN NOTE
Patient with longstanding CKD (presumed 2/2 HTN and DMII) here with JIM vs progression of disease. Cr stable at 3.2 from baseline of 2.2. No clear inciting factors to suggest pre-renal etiology. No NSAID use, medication changes. Reports normal urination so lower suspicion for obstructive uropathy however renal US is pending. ~1g proteinuria and has undergone workup for proteinuria (many years ago) which at that time was negative. Will need a new nephrologist outpatient for CKD management. It's possible he is having anticoagulation-related nephropathy given supratherapeutic INR in setting of mechanical valve however urine microscopy bland. Imaging with concerning lesion anterior to the bladder and with Cr improvement after morrison raises possibility of obstructive uropathy - will recommend evaluation by urology while inpatient.     Recommendations:  - Recommend urology consult for possible obstructive uropathy given imaging findings and Cr improvement after morrison placement  - Will need CT abdomen with contrast once JIM resolves for better characterization of lesion on US  - Would continue to hold bumex for now  - Continue holding lisinopril for now  - Renal diet when not NPO

## 2023-04-14 NOTE — HPI
Patient is an 81 yo M with PMH of HTN, DMII, CAD s/p CABG in 90s, s/p aortic valve replacement on warfarin, combined HF (grade III diastolic dysfunction, EF 45%), mod/severe MR (evaling for mitral clip), CKD4 admitted due to elevated INR. He is being evaluated for mitral clip and was scheduled for KIRSTEN however found to have elevated INR so was hospitalized. Also noted to have elevated Cr to 3.5 on admission from baseline of 2-2.2. He was found to be dehydrated and his Cr has improved to 2.7 today. Patient was voiding spontaneously on admission. No bladder scan has been performed. Ku was ordered for I/O's and was placed yesterday afternoon. He denies any Urologic history. He reports voiding 5-6x daily with strong stream and feels as though he empties completely. He denies fevers, chills, nausea, vomiting, difficulty voiding, dysuria, hematuria.     Renal US obtained 4/13/23 shows medical renal disease and small simple cyst on the right, no renal masses or hydro. Bladder is decompressed around the Ku catheter. There is an indeterminate lesion anterior to the bladder which is not well defined, could be bowel. Previous abdominal imaging showed no concerning  findings. WBC 5. Cr 2.7 (3.5 on admission, ~2.0-2.2 baseline). Clean catch UA nitrite negative, 1 RBC, 3 WBC, rare bacteria.

## 2023-04-14 NOTE — NURSING TRANSFER
Nursing Transfer Note      4/14/2023     Reason patient is being transferred: post procedure    Transfer To: [id number]    Transfer via bed    Transfer with cardiac monitoring, heparin drip infusing    Transported by transport    Medicines sent: none    Any special needs or follow-up needed: routine    Chart send with patient: Yes    Notified: no one on text    Patient reassessed at: 1550 4/14/2023

## 2023-04-14 NOTE — PROGRESS NOTES
[facility]  Cardiology  Progress Note    Patient Name: Dariel Urbina  MRN: [id number]  Admission Date: 4/11/2023  Hospital Length of Stay: 1 days  Code Status: Full Code   Attending Physician: Erika Carmen MD   Primary Care Physician: Devon Langston MD  Expected Discharge Date: 4/14/2023  Principal Problem:Supratherapeutic INR    Subjective:     Hospital Course:   Admitted to CCU to monitor INR for KIRSTEN.  Vit K given.  Also with elevated Cr, somewhat improving, however unclear etiology, nephrology consulted.  Renal ultrasound showing nonobstructive bladder mass, urology consulted.  Unable to obtain recommened CT with contrast due to renal function.  INR subtherapeutic after vit K, started on bridging heparin drip.  KIRSTEN performed 4/14.      Interval History: naeon.  INR subtherapeutic, started on heparin drip.  Plan for KIRSTEN today.  No complaints this morning.    Review of Systems   Constitutional: Negative.   HENT: Negative.     Eyes: Negative.    Cardiovascular:  Negative for chest pain.   Respiratory:  Negative for shortness of breath.    Endocrine: Negative.    Skin: Negative.    Musculoskeletal: Negative.    Gastrointestinal: Negative.    Genitourinary: Negative.    Psychiatric/Behavioral: Negative.     Objective:     Vital Signs (Most Recent):  Temp: 97.9 °F (36.6 °C) (04/14/23 1149)  Pulse: (!) 55 (04/14/23 1149)  Resp: 19 (04/14/23 1149)  BP: (!) 140/68 (04/14/23 1149)  SpO2: 98 % (04/14/23 1149)   Vital Signs (24h Range):  Temp:  [97.5 °F (36.4 °C)-98.5 °F (36.9 °C)] 97.9 °F (36.6 °C)  Pulse:  [53-67] 55  Resp:  [16-19] 19  SpO2:  [95 %-100 %] 98 %  BP: (114-140)/(56-86) 140/68     Weight: 73.5 kg (162 lb)  Body mass index is 26.96 kg/m².     SpO2: 98 %         Intake/Output Summary (Last 24 hours) at 4/14/2023 1411  Last data filed at 4/14/2023 0421  Gross per 24 hour   Intake 500 ml   Output 904 ml   Net -404 ml       Lines/Drains/Airways       Drain  Duration                   Urethral Catheter 04/13/23 1600 <1 day              Peripheral Intravenous Line  Duration                  Peripheral IV - Single Lumen 04/11/23 0823 20 G Left Antecubital 3 days                    Physical Exam  Constitutional:       Appearance: Normal appearance.   HENT:      Head: Normocephalic and atraumatic.      Mouth/Throat:      Mouth: Mucous membranes are moist.   Cardiovascular:      Rate and Rhythm: Normal rate and regular rhythm.   Musculoskeletal:         General: Normal range of motion.      Cervical back: Normal range of motion.      Right lower leg: No edema.      Left lower leg: No edema.   Skin:     General: Skin is warm.      Capillary Refill: Capillary refill takes less than 2 seconds.   Neurological:      General: No focal deficit present.      Mental Status: He is alert and oriented to person, place, and time.       Significant Labs: CMP   Recent Labs   Lab 04/13/23  0408 04/14/23  0703    138   K 4.4 4.3    109   CO2 18* 19*   GLU 82 94   BUN 69* 69*   CREATININE 3.2* 2.7*   CALCIUM 9.9 10.5   PROT 7.0 7.1   ALBUMIN 3.4* 3.5   BILITOT 0.4 0.4   ALKPHOS 62 68   AST 18 16   ALT 12 11   ANIONGAP 11 10    and CBC   Recent Labs   Lab 04/13/23  0408 04/14/23  0703   WBC 4.26 5.08   HGB 9.3* 9.8*   HCT 28.7* 29.7*    158       Significant Imaging:  reviewed    Assessment and Plan:     * Supratherapeutic INR  INR 4.9 on admission, with recent changes by coumadin clinic.  Previously therapeutic one week prior to admission.  - trend INR  - restart warfarin  - gave 1 dose vit k, inr now subtherapeutic, started on hep drip     Bladder mass  - renal ultrasound without hydro, however does show nonobstructive nonspecific bladder mass  - urology consulted    Mitral insufficiency  Presented for outpt KIRSTEN for mitralclip eval, however INR supratherapeutic (4.9) on presentation.  - will hold warfarin, trend INR until therapeutic  - keep on tele  - KIRSTEN today    Chronic combined systolic and  diastolic heart failure    Lab Results   Component Value Date    TROPONINI 0.164 (H) 12/29/2022     (H) 04/12/2023     Results for orders placed during the hospital encounter of 12/29/22  Echo  Interpretation Summary  · The left ventricle is normal in size with mild concentric hypertrophy and mildly decreased systolic function.  · The estimated ejection fraction is 48%.  · There are segmental left ventricular wall motion abnormalities.  · There is abnormal septal wall motion.  · Grade III left ventricular diastolic dysfunction.  · Severe left atrial enlargement.  · Normal right ventricular size with normal right ventricular systolic function.  · Mild right atrial enlargement.  · There is a mechanical aortic valve present.  · The aortic valve mean gradient is 17 mmHg with a dimensionless index of 0.38.  · Moderate to moderate -severe ( 2-3+) MR  · Moderate tricuspid regurgitation.  · Normal central venous pressure (3 mmHg).  · The estimated PA systolic pressure is 62 mmHg.  · There is at least moderate pulmonary hypertension.  - appears compensated on exam  - hold home bumex per nephro for renal function  - hold ACE for JIM  - Cardiac diet, fluid restriction at 1500 cc with strict I/Os and daily standing weights  - Maintain Mg >2, K >4    Acute renal failure superimposed on stage 3 chronic kidney disease  Baseline Cr 1.8-2.2.    Lab Results   Component Value Date    BUN 69 (H) 04/14/2023    CREATININE 2.7 (H) 04/14/2023   - nephro consulted  - renal ultrasound without hydro, however does show nonobstructive nonspecific bladder mass  - hold home lisinopril  - strict I/O  - CMP qd, trend Cr  - renally dose all medications  - avoid nephrotoxic agents, NSAIDs, IV contrast, ACE/ARB  - holding bumex today        VTE Risk Mitigation (From admission, onward)         Ordered     warfarin (COUMADIN) tablet 5 mg  Once per day on Mon Wed Fri Sat         04/14/23 1003     warfarin (COUMADIN) tablet 7.5 mg  Once per day on  Sun Tue Thu         04/13/23 1026     warfarin (COUMADIN) tablet 7.5 mg  Once         04/14/23 1003     heparin 25,000 units in dextrose 5% (100 units/ml) IV bolus from bag - ADDITIONAL PRN BOLUS - 60 units/kg  As needed (PRN)        Question:  Heparin Infusion Adjustment (DO NOT MODIFY ANSWER)  Answer:  [email or web address]\epic\Images\Pharmacy\HeparinInfusions\heparin LOW INTENSITY nomogram for [facility] [id number]    04/14/23 0857     heparin 25,000 units in dextrose 5% (100 units/ml) IV bolus from bag - ADDITIONAL PRN BOLUS - 30 units/kg  As needed (PRN)        Question:  Heparin Infusion Adjustment (DO NOT MODIFY ANSWER)  Answer:  [email or web address]\epic\Images\Pharmacy\HeparinInfusions\heparin LOW INTENSITY nomogram for [facility] [id number]    04/14/23 0857     heparin 25,000 units in dextrose 5% (100 units/ml) IV bolus from bag INITIAL BOLUS  Once        Question:  Heparin Infusion Adjustment (DO NOT MODIFY ANSWER)  Answer:  [email or web address]\epic\Images\Pharmacy\HeparinInfusions\heparin LOW INTENSITY nomogram for [facility] [id number].pdf    04/14/23 0857     heparin 25,000 units in dextrose 5% 250 mL (100 units/mL) infusion LOW INTENSITY nomogram - [facility]  Continuous        Question Answer Comment   Heparin Infusion Adjustment (DO NOT MODIFY ANSWER) [email or web address]\epic\Images\Pharmacy\HeparinInfusions\heparin LOW INTENSITY nomogram for [facility] [id number].pdf    Begin at (in units/kg/hr) 12        04/14/23 0857     Reason for No Pharmacological VTE Prophylaxis  Once        Question:  Reasons:  Answer:  Risk of Bleeding    04/11/23 1448     IP VTE HIGH RISK PATIENT  Once         04/11/23 1448     Place sequential compression device  Until discontinued         04/11/23 1448                Alessio Wray MD  Cardiology  [facility] - Cardiology Stepdown

## 2023-04-14 NOTE — PROGRESS NOTES
[facility] - [facility]  Nephrology  Progress Note    Patient Name: Dariel Urbina  MRN: [id number]  Admission Date: 4/11/2023  Hospital Length of Stay: 1 days  Attending Provider: Erika Carmen MD   Primary Care Physician: Devon Langston MD  Principal Problem:Supratherapeutic INR    Subjective:     HPI: 82M with HTN, DMII, CAD s/p CABG in 90s, s/p aortic valve replacement on warfarin, combined HF (grade III diastolic dysfunction, EF 45%), mod/severe MR (evaling for mitral clip), CKD4 here with elevated INR. He is being evaluated for mitral clip and was scheduled for KIRSTEN however found to have elevated INR so was hospitalized. Also noted to have elevated Cr to 3.5 on admission from baseline of 2-2.2. States he has longstanding chronic kidney disease but has not seen a nephrologist in some time. Denies any recent medication changes. No NSAID use. No vomiting and having normal PO intake. Unchanged urine output, no changes in color. He has noted some increased foaminess to his urine recently, unclear timeline. He is chronically SOB and has not noted any recent worsening.     Since admission, his Cr has decreased from 3.5 to 3.2, now stable. Has been hemodynamically stable with normal electrolytes. UA with 2+ protein and 2+ occult blood, normal T bili. Urine prot:Cr ratio 0.99. Renal US pending. Nephrology consulted for JIM on CKD vs progression of CKD with new baseline.      Interval history: NAEON. Renal function improved. Good UOP with morrison. Urine microscopy bland. KIRSTEN for today per primary.    Review of Systems   Constitutional:  Negative for chills, fatigue and fever.   HENT:  Negative for sore throat and trouble swallowing.    Eyes:  Negative for pain and visual disturbance.   Respiratory:  Positive for shortness of breath. Negative for cough and chest tightness.    Cardiovascular:  Negative for chest pain and leg swelling.   Gastrointestinal:  Positive for diarrhea (intermittent). Negative for  abdominal pain, constipation, nausea and vomiting.   Endocrine: Negative for polyuria.   Genitourinary:  Negative for dysuria, flank pain and frequency.   Musculoskeletal:  Negative for arthralgias and gait problem.   Neurological:  Negative for dizziness, numbness and headaches.   Psychiatric/Behavioral:  Negative for agitation and confusion.    Objective:     Vital Signs (Most Recent):  Temp: 97.5 °F (36.4 °C) (04/14/23 0720)  Pulse: (!) 54 (04/14/23 0720)  Resp: 19 (04/14/23 0720)  BP: (!) 130/59 (04/14/23 0720)  SpO2: 100 % (04/14/23 0720)   Vital Signs (24h Range):  Temp:  [97.5 °F (36.4 °C)-98.5 °F (36.9 °C)] 97.5 °F (36.4 °C)  Pulse:  [53-67] 54  Resp:  [16-19] 19  SpO2:  [95 %-100 %] 100 %  BP: (114-137)/(56-86) 130/59     Weight: 73.5 kg (162 lb) (04/11/23 0821)  Body mass index is 26.96 kg/m².  Body surface area is 1.84 meters squared.    I/O last 3 completed shifts:  In: 620 [P.O.:620]  Out: 904 [Urine:904]    Physical Exam  Vitals reviewed.   Constitutional:       General: He is not in acute distress.     Appearance: Normal appearance. He is not ill-appearing.   HENT:      Head: Normocephalic and atraumatic.      Right Ear: External ear normal.      Left Ear: External ear normal.      Nose: Nose normal.      Mouth/Throat:      Pharynx: Oropharynx is clear. No posterior oropharyngeal erythema.   Eyes:      General: No scleral icterus.     Conjunctiva/sclera: Conjunctivae normal.   Cardiovascular:      Rate and Rhythm: Normal rate and regular rhythm.      Pulses: Normal pulses.      Heart sounds: Murmur heard.      Comments: JVD  to ear lobe  Mechanical valve sound  Pulmonary:      Effort: Pulmonary effort is normal.      Breath sounds: Rales present.   Abdominal:      General: There is no distension.      Palpations: Abdomen is soft.      Tenderness: There is no abdominal tenderness.   Musculoskeletal:         General: Normal range of motion.      Cervical back: Normal range of motion.      Right lower  leg: No edema.      Left lower leg: No edema.   Skin:     General: Skin is warm and dry.   Neurological:      General: No focal deficit present.      Mental Status: He is alert and oriented to person, place, and time.   Psychiatric:         Mood and Affect: Mood normal.         Behavior: Behavior normal.       Significant Labs:  CMP:   Recent Labs   Lab 04/14/23  0703   GLU 94   CALCIUM 10.5   ALBUMIN 3.5   PROT 7.1      K 4.3   CO2 19*      BUN 69*   CREATININE 2.7*   ALKPHOS 68   ALT 11   AST 16   BILITOT 0.4       All labs within the past 24 hours have been reviewed.    Significant Imaging:  Reviewed    Assessment/Plan:     Renal/  Acute renal failure superimposed on stage 3 chronic kidney disease  Patient with longstanding CKD (presumed 2/2 HTN and DMII) here with JIM vs progression of disease. Cr stable at 3.2 from baseline of 2.2. No clear inciting factors to suggest pre-renal etiology. No NSAID use, medication changes. Reports normal urination so lower suspicion for obstructive uropathy however renal US is pending. ~1g proteinuria and has undergone workup for proteinuria (many years ago) which at that time was negative. Will need a new nephrologist outpatient for CKD management. It's possible he is having anticoagulation-related nephropathy given supratherapeutic INR in setting of mechanical valve however urine microscopy bland. Imaging with concerning lesion anterior to the bladder and with Cr improvement after morrison raises possibility of obstructive uropathy - will recommend evaluation by urology while inpatient.     Recommendations:  - Recommend urology consult for possible obstructive uropathy given imaging findings and Cr improvement after morrison placement  - Will need CT abdomen with contrast once JIM resolves for better characterization of lesion on US  - Would continue to hold bumex for now  - Continue holding lisinopril for now  - Renal diet when not NPO        Thank you for your  consult. I will follow-up with patient. Please contact us if you have any additional questions.    Dl Segal MD  Nephrology  [facility] - [facility] Step[facility]

## 2023-04-14 NOTE — PLAN OF CARE
APPOINTMENT:  Patient has been scheduled for a PCP Hospital Follow Up with Mine Hazel PA-C on Tuesday Apr 18, 2023 at 9:30 AM.    Please arrive approximately 15 minutes before your scheduled appointment time and ensure that you have a valid government issued ID and your insurance card.    [facility]   Please park in surface lot and check in at central registration desk.    [facility]  [address]   [facility] [id number]  [phone]        Virgen Vitale  Community Health Worker(CHW)  Case Management  Ext. [phone]

## 2023-04-14 NOTE — PLAN OF CARE
Problem: Adult Inpatient Plan of Care  Goal: Absence of Hospital-Acquired Illness or Injury  Outcome: Ongoing, Progressing  Goal: Optimal Comfort and Wellbeing  Outcome: Ongoing, Progressing     Problem: Diabetes Comorbidity  Goal: Blood Glucose Level Within Targeted Range  Outcome: Ongoing, Progressing     Problem: Fall Injury Risk  Goal: Absence of Fall and Fall-Related Injury  Outcome: Ongoing, Progressing     Problem: Fluid and Electrolyte Imbalance (Acute Kidney Injury/Impairment)  Goal: Fluid and Electrolyte Balance  Outcome: Ongoing, Progressing     Problem: Oral Intake Inadequate (Acute Kidney Injury/Impairment)  Goal: Optimal Nutrition Intake  Outcome: Ongoing, Progressing     Problem: Infection  Goal: Absence of Infection Signs and Symptoms  Outcome: Ongoing, Progressing     Plan of care discussed with patient.  Patient ambulating with assistance, fall precautions in place. VSS. Heparin at 12 U/kg/hr. Ku intact. Patient has no complaints of pain. Discussed medications and care. Patient has no questions at this time. Will continue to monitor.

## 2023-04-14 NOTE — ASSESSMENT & PLAN NOTE
INR 4.9 on admission, with recent changes by coumadin clinic.  Previously therapeutic one week prior to admission.  - trend INR  - restart warfarin  - gave 1 dose vit k, inr now subtherapeutic, started on hep drip

## 2023-04-15 VITALS
TEMPERATURE: 98 F | SYSTOLIC BLOOD PRESSURE: 145 MMHG | BODY MASS INDEX: 26.99 KG/M2 | WEIGHT: 162 LBS | RESPIRATION RATE: 18 BRPM | HEART RATE: 61 BPM | OXYGEN SATURATION: 92 % | HEIGHT: 65 IN | DIASTOLIC BLOOD PRESSURE: 67 MMHG

## 2023-04-15 LAB
ALBUMIN SERPL BCP-MCNC: 3.3 G/DL (ref 3.5–5.2)
ALP SERPL-CCNC: 61 U/L (ref 55–135)
ALT SERPL W/O P-5'-P-CCNC: 11 U/L (ref 10–44)
ANION GAP SERPL CALC-SCNC: 9 MMOL/L (ref 8–16)
APTT PPP: 46.7 SEC (ref 21–32)
APTT PPP: 48.3 SEC (ref 21–32)
AST SERPL-CCNC: 16 U/L (ref 10–40)
BASOPHILS # BLD AUTO: 0.04 K/UL (ref 0–0.2)
BASOPHILS NFR BLD: 0.9 % (ref 0–1.9)
BILIRUB SERPL-MCNC: 0.5 MG/DL (ref 0.1–1)
BUN SERPL-MCNC: 57 MG/DL (ref 8–23)
CALCIUM SERPL-MCNC: 10.2 MG/DL (ref 8.7–10.5)
CHLORIDE SERPL-SCNC: 110 MMOL/L (ref 95–110)
CO2 SERPL-SCNC: 17 MMOL/L (ref 23–29)
CREAT SERPL-MCNC: 2.4 MG/DL (ref 0.5–1.4)
DIFFERENTIAL METHOD: ABNORMAL
EOSINOPHIL # BLD AUTO: 0.2 K/UL (ref 0–0.5)
EOSINOPHIL NFR BLD: 3.4 % (ref 0–8)
ERYTHROCYTE [DISTWIDTH] IN BLOOD BY AUTOMATED COUNT: 13.6 % (ref 11.5–14.5)
EST. GFR  (NO RACE VARIABLE): 26.4 ML/MIN/1.73 M^2
GLUCOSE SERPL-MCNC: 87 MG/DL (ref 70–110)
HCT VFR BLD AUTO: 28.9 % (ref 40–54)
HGB BLD-MCNC: 9.3 G/DL (ref 14–18)
IMM GRANULOCYTES # BLD AUTO: 0.01 K/UL (ref 0–0.04)
IMM GRANULOCYTES NFR BLD AUTO: 0.2 % (ref 0–0.5)
INR PPP: 1.5 (ref 0.8–1.2)
LYMPHOCYTES # BLD AUTO: 1.3 K/UL (ref 1–4.8)
LYMPHOCYTES NFR BLD: 26.9 % (ref 18–48)
MAGNESIUM SERPL-MCNC: 1.7 MG/DL (ref 1.6–2.6)
MCH RBC QN AUTO: 30.5 PG (ref 27–31)
MCHC RBC AUTO-ENTMCNC: 32.2 G/DL (ref 32–36)
MCV RBC AUTO: 95 FL (ref 82–98)
MONOCYTES # BLD AUTO: 0.5 K/UL (ref 0.3–1)
MONOCYTES NFR BLD: 10.7 % (ref 4–15)
NEUTROPHILS # BLD AUTO: 2.7 K/UL (ref 1.8–7.7)
NEUTROPHILS NFR BLD: 57.9 % (ref 38–73)
NRBC BLD-RTO: 0 /100 WBC
PHOSPHATE SERPL-MCNC: 3.9 MG/DL (ref 2.7–4.5)
PLATELET # BLD AUTO: 156 K/UL (ref 150–450)
PMV BLD AUTO: 10.4 FL (ref 9.2–12.9)
POTASSIUM SERPL-SCNC: 4.5 MMOL/L (ref 3.5–5.1)
PROT SERPL-MCNC: 6.8 G/DL (ref 6–8.4)
PROTHROMBIN TIME: 15.2 SEC (ref 9–12.5)
RBC # BLD AUTO: 3.05 M/UL (ref 4.6–6.2)
SODIUM SERPL-SCNC: 136 MMOL/L (ref 136–145)
WBC # BLD AUTO: 4.69 K/UL (ref 3.9–12.7)

## 2023-04-15 PROCEDURE — 83735 ASSAY OF MAGNESIUM: CPT | Mod: HCNC | Performed by: STUDENT IN AN ORGANIZED HEALTH CARE EDUCATION/TRAINING PROGRAM

## 2023-04-15 PROCEDURE — 36415 COLL VENOUS BLD VENIPUNCTURE: CPT | Mod: HCNC | Performed by: STUDENT IN AN ORGANIZED HEALTH CARE EDUCATION/TRAINING PROGRAM

## 2023-04-15 PROCEDURE — 96366 THER/PROPH/DIAG IV INF ADDON: CPT

## 2023-04-15 PROCEDURE — 84100 ASSAY OF PHOSPHORUS: CPT | Mod: HCNC | Performed by: STUDENT IN AN ORGANIZED HEALTH CARE EDUCATION/TRAINING PROGRAM

## 2023-04-15 PROCEDURE — G0378 HOSPITAL OBSERVATION PER HR: HCPCS | Mod: HCNC

## 2023-04-15 PROCEDURE — 99239 HOSP IP/OBS DSCHRG MGMT >30: CPT | Mod: HCNC,GC,, | Performed by: INTERNAL MEDICINE

## 2023-04-15 PROCEDURE — 25000003 PHARM REV CODE 250: Mod: HCNC | Performed by: STUDENT IN AN ORGANIZED HEALTH CARE EDUCATION/TRAINING PROGRAM

## 2023-04-15 PROCEDURE — 85730 THROMBOPLASTIN TIME PARTIAL: CPT | Mod: HCNC | Performed by: STUDENT IN AN ORGANIZED HEALTH CARE EDUCATION/TRAINING PROGRAM

## 2023-04-15 PROCEDURE — 85610 PROTHROMBIN TIME: CPT | Mod: HCNC | Performed by: STUDENT IN AN ORGANIZED HEALTH CARE EDUCATION/TRAINING PROGRAM

## 2023-04-15 PROCEDURE — 63600175 PHARM REV CODE 636 W HCPCS: Mod: HCNC | Performed by: STUDENT IN AN ORGANIZED HEALTH CARE EDUCATION/TRAINING PROGRAM

## 2023-04-15 PROCEDURE — 99239 PR HOSPITAL DISCHARGE DAY,>30 MIN: ICD-10-PCS | Mod: HCNC,GC,, | Performed by: INTERNAL MEDICINE

## 2023-04-15 PROCEDURE — 80053 COMPREHEN METABOLIC PANEL: CPT | Mod: HCNC | Performed by: STUDENT IN AN ORGANIZED HEALTH CARE EDUCATION/TRAINING PROGRAM

## 2023-04-15 PROCEDURE — 85730 THROMBOPLASTIN TIME PARTIAL: CPT | Mod: 91,HCNC | Performed by: INTERNAL MEDICINE

## 2023-04-15 PROCEDURE — 85025 COMPLETE CBC W/AUTO DIFF WBC: CPT | Mod: HCNC | Performed by: STUDENT IN AN ORGANIZED HEALTH CARE EDUCATION/TRAINING PROGRAM

## 2023-04-15 PROCEDURE — 36415 COLL VENOUS BLD VENIPUNCTURE: CPT | Mod: HCNC | Performed by: INTERNAL MEDICINE

## 2023-04-15 PROCEDURE — 96372 THER/PROPH/DIAG INJ SC/IM: CPT | Performed by: STUDENT IN AN ORGANIZED HEALTH CARE EDUCATION/TRAINING PROGRAM

## 2023-04-15 RX ORDER — BUMETANIDE 1 MG/1
1 TABLET ORAL EVERY OTHER DAY
Qty: 15 TABLET | Refills: 11 | Status: SHIPPED | OUTPATIENT
Start: 2023-04-15 | End: 2023-08-10 | Stop reason: SDUPTHER

## 2023-04-15 RX ORDER — WARFARIN SODIUM 5 MG/1
TABLET ORAL
Qty: 30 TABLET | Refills: 11
Start: 2023-04-15 | End: 2023-08-14 | Stop reason: SDUPTHER

## 2023-04-15 RX ORDER — ENOXAPARIN SODIUM 100 MG/ML
80 INJECTION SUBCUTANEOUS ONCE
Status: COMPLETED | OUTPATIENT
Start: 2023-04-15 | End: 2023-04-15

## 2023-04-15 RX ORDER — ENOXAPARIN SODIUM 100 MG/ML
80 INJECTION SUBCUTANEOUS DAILY
Qty: 4 ML | Refills: 0 | Status: SHIPPED | OUTPATIENT
Start: 2023-04-16 | End: 2023-04-21

## 2023-04-15 RX ORDER — ENOXAPARIN SODIUM 100 MG/ML
80 INJECTION SUBCUTANEOUS DAILY
Qty: 4 ML | Refills: 0 | Status: SHIPPED | OUTPATIENT
Start: 2023-04-15 | End: 2023-04-20

## 2023-04-15 RX ADMIN — ENOXAPARIN SODIUM 80 MG: 100 INJECTION SUBCUTANEOUS at 11:04

## 2023-04-15 RX ADMIN — METOPROLOL SUCCINATE 25 MG: 25 TABLET, EXTENDED RELEASE ORAL at 08:04

## 2023-04-15 RX ADMIN — ALLOPURINOL 100 MG: 300 TABLET ORAL at 08:04

## 2023-04-15 RX ADMIN — ISOSORBIDE MONONITRATE 60 MG: 60 TABLET, EXTENDED RELEASE ORAL at 08:04

## 2023-04-15 RX ADMIN — Medication 324 MG: at 08:04

## 2023-04-15 RX ADMIN — ATORVASTATIN CALCIUM 80 MG: 40 TABLET, FILM COATED ORAL at 08:04

## 2023-04-15 NOTE — SUBJECTIVE & OBJECTIVE
Interval History: NAEON. Pt remains stable and denies any issues at this time. INR 1.5 today. KIRSTEN completed yesterday with no issues.     Review of Systems   Constitutional: Negative for fever and malaise/fatigue.   Eyes:  Negative for blurred vision and pain.   Cardiovascular:  Negative for chest pain, dyspnea on exertion, leg swelling, orthopnea, palpitations and paroxysmal nocturnal dyspnea.   Respiratory:  Negative for cough, shortness of breath, sputum production and wheezing.    Hematologic/Lymphatic: Negative for adenopathy and bleeding problem.   Skin:  Negative for rash.   Musculoskeletal:  Negative for back pain and neck pain.   Gastrointestinal:  Negative for abdominal pain, constipation, diarrhea, nausea and vomiting.   Genitourinary:  Negative for dysuria.   Neurological:  Negative for dizziness, headaches, light-headedness and weakness.   Objective:     Vital Signs (Most Recent):  Temp: 98.3 °F (36.8 °C) (04/15/23 0739)  Pulse: (!) 53 (04/15/23 0739)  Resp: 18 (04/15/23 0739)  BP: (!) 142/67 (04/15/23 0739)  SpO2: 97 % (04/15/23 0739)   Vital Signs (24h Range):  Temp:  [97.7 °F (36.5 °C)-98.4 °F (36.9 °C)] 98.3 °F (36.8 °C)  Pulse:  [53-74] 53  Resp:  [12-19] 18  SpO2:  [95 %-99 %] 97 %  BP: (115-157)/(56-92) 142/67     Weight: 73.5 kg (162 lb)  Body mass index is 26.96 kg/m².     SpO2: 97 %         Intake/Output Summary (Last 24 hours) at 4/15/2023 0855  Last data filed at 4/15/2023 0447  Gross per 24 hour   Intake 480 ml   Output 1000 ml   Net -520 ml       Lines/Drains/Airways       Drain  Duration                  Urethral Catheter 04/13/23 1600 1 day              Peripheral Intravenous Line  Duration                  Peripheral IV - Single Lumen 04/11/23 0823 20 G Left Antecubital 4 days                    Physical Exam  Constitutional:       General: He is not in acute distress.     Appearance: Normal appearance. He is not ill-appearing, toxic-appearing or diaphoretic.   HENT:      Head:  Normocephalic and atraumatic.      Nose: Nose normal.   Eyes:      Extraocular Movements: Extraocular movements intact.      Pupils: Pupils are equal, round, and reactive to light.   Cardiovascular:      Rate and Rhythm: Normal rate and regular rhythm.      Heart sounds: No murmur heard.    No friction rub. No gallop.   Pulmonary:      Effort: Pulmonary effort is normal. No respiratory distress.      Breath sounds: Normal breath sounds. No wheezing or rales.   Abdominal:      General: Abdomen is flat. There is no distension.      Palpations: Abdomen is soft. There is no mass.      Tenderness: There is no abdominal tenderness.   Musculoskeletal:         General: No swelling. Normal range of motion.      Cervical back: Normal range of motion. No rigidity.      Right lower leg: No edema.      Left lower leg: No edema.   Skin:     General: Skin is warm and dry.      Coloration: Skin is not jaundiced.      Findings: No bruising.   Neurological:      General: No focal deficit present.      Mental Status: He is alert and oriented to person, place, and time.      Cranial Nerves: No cranial nerve deficit.      Motor: No weakness.       Significant Labs: BMP:   Recent Labs   Lab 04/14/23  0703 04/15/23  0312   GLU 94 87    136   K 4.3 4.5    110   CO2 19* 17*   BUN 69* 57*   CREATININE 2.7* 2.4*   CALCIUM 10.5 10.2   MG 1.8 1.7    and CBC   Recent Labs   Lab 04/14/23  0703 04/15/23  0312   WBC 5.08 4.69   HGB 9.8* 9.3*   HCT 29.7* 28.9*    156       Significant Imaging: Reviewed

## 2023-04-15 NOTE — ASSESSMENT & PLAN NOTE
INR 4.9 on admission, with recent changes by coumadin clinic.  Previously therapeutic one week prior to admission.    - restart warfarin  - gave 1 dose vit k, inr now subtherapeutic, started on hep drip   - INR 1.5  Today, will continue to bridge to warfarin

## 2023-04-15 NOTE — ASSESSMENT & PLAN NOTE
Presented for outpt KIRSTEN for mitralclip eval, however INR supratherapeutic (4.9) on presentation.  - will hold warfarin, trend INR until therapeutic  - keep on tele  - KIRSTEN on 4/14/23 with moderate MR. Will follow up with Dr. Garnica as outpatient

## 2023-04-15 NOTE — DISCHARGE INSTRUCTIONS
Please take Lovenox 80 mg tonight and for the next five days. Continue warfarin tonight as well. Take 7.5 of warfarin tonight then continue warfarin regimen as per Coumadin clinic recommendations.

## 2023-04-15 NOTE — DISCHARGE SUMMARY
[facility] Step[facility]  Cardiology  Discharge Summary      Patient Name: Dariel Urbina  MRN: [id number]  Admission Date: 4/11/2023  Hospital Length of Stay: 1 days  Discharge Date and Time:  04/15/2023 10:17 AM  Attending Physician: Erika Carmen MD    Discharging Provider: Dany Cortez MD  Primary Care Physician: Devon Langston MD    HPI:   82 y.o. male with past medical history of:   -CAD s/p CABG in 1990s  -Mechanical AV on Warfarin  -Mod-severe MR  -CKD Stage IV     Who presents as a referral from Dr. Devine for MitraClip evaluation. Patient states that over the past month he has been having intermittent chest pain while walking. Pain does not radiate and is relievedby rest. PET stress positive in RCA/Lcx territory. He has also experienced lower extremity edema and shortness of breath. He was seen by  who referred him for MitraClip. Denies palpitations and claudication. Patient also reports R arm pain due to recent fall.       Procedure(s) (LRB):  Transesophageal echo (KIRSTEN) intra-procedure log documentation (N/A)     Indwelling Lines/Drains at time of discharge:  Lines/Drains/Airways       Drain  Duration                  Urethral Catheter 04/13/23 1600 1 day                    Hospital Course:  Admitted to CCU to monitor INR for KIRSTEN.  Vit K given.  Also with elevated Cr, somewhat improving, however unclear etiology, nephrology consulted.  Renal ultrasound showing nonobstructive bladder mass, urology consulted.  Unable to obtain recommened CT with contrast due to renal function.  INR subtherapeutic after vit K, started on bridging heparin drip.  KIRSTEN performed 4/14 showed moderate MR. INR 1.5 on 4/15. Pt discharged on Lovenox bridge to warfarin. He denies any issues upon discharge.     Physical Exam  Constitutional:       General: He is not in acute distress.     Appearance: Normal appearance. He is not ill-appearing, toxic-appearing or diaphoretic.   HENT:      Head:  Normocephalic and atraumatic.      Nose: Nose normal.   Eyes:      Extraocular Movements: Extraocular movements intact.      Pupils: Pupils are equal, round, and reactive to light.   Cardiovascular:      Rate and Rhythm: Normal rate and regular rhythm.      Heart sounds: No murmur heard.    No friction rub. No gallop.   Pulmonary:      Effort: Pulmonary effort is normal. No respiratory distress.      Breath sounds: Normal breath sounds. No wheezing or rales.   Abdominal:      General: Abdomen is flat. There is no distension.      Palpations: Abdomen is soft. There is no mass.      Tenderness: There is no abdominal tenderness.   Musculoskeletal:         General: No swelling. Normal range of motion.      Cervical back: Normal range of motion. No rigidity.      Right lower leg: No edema.      Left lower leg: No edema.   Skin:     General: Skin is warm and dry.      Coloration: Skin is not jaundiced.      Findings: No bruising.   Neurological:      General: No focal deficit present.      Mental Status: He is alert and oriented to person, place, and time.      Cranial Nerves: No cranial nerve deficit.      Motor: No weakness.     Goals of Care Treatment Preferences:  Code Status: Full Code      Consults:   Consults (From admission, onward)          Status Ordering Provider     Inpatient consult to Urology  Once        Provider:  (Not yet assigned)    Completed ANTONIETA JORDAN     Inpatient consult to Nephrology  Once        Provider:  (Not yet assigned)    Completed ANTONIETA JORDAN            Significant Diagnostic Studies: Labs:   BMP:   Recent Labs   Lab 04/14/23  0703 04/15/23  0312   GLU 94 87    136   K 4.3 4.5    110   CO2 19* 17*   BUN 69* 57*   CREATININE 2.7* 2.4*   CALCIUM 10.5 10.2   MG 1.8 1.7    and CBC   Recent Labs   Lab 04/14/23  0703 04/15/23  0312   WBC 5.08 4.69   HGB 9.8* 9.3*   HCT 29.7* 28.9*    156       Pending Diagnostic Studies:       Procedure Component Value Units Date/Time     Transesophageal echo (KIRSTEN) [[id number]]     Order Status: Sent Lab Status: No result             Final Active Diagnoses:    Diagnosis Date Noted POA    PRINCIPAL PROBLEM:  Supratherapeutic INR [R79.1] 12/21/2022 Unknown    Mitral insufficiency [I34.0] 03/22/2023 Yes    Chronic combined systolic and diastolic heart failure [I50.42] 12/31/2022 Yes    Acute renal failure superimposed on stage 3 chronic kidney disease [N17.9, N18.30] 03/19/2018 Yes      Problems Resolved During this Admission:    Diagnosis Date Noted Date Resolved POA    Acute kidney injury superimposed on CKD [N17.9, N18.9] 12/21/2022 04/12/2023 Unknown     Cardiac/Vascular  Mitral insufficiency  Presented for outpt KIRSTEN for mitralclip eval, however INR supratherapeutic (4.9) on presentation.  - will hold warfarin, trend INR until therapeutic  - keep on tele  - KIRSTEN on 4/14/23 with moderate MR. Will follow up with Dr. Garnica as outpatient     Hematology  * Supratherapeutic INR  INR 4.9 on admission, with recent changes by coumadin clinic.  Previously therapeutic one week prior to admission.    - restart warfarin  - gave 1 dose vit k, inr now subtherapeutic, started on hep drip   - INR 1.5  Today, will continue to bridge to warfarin         Discharged Condition: stable    Disposition: Home or Self Care    Follow Up:    Patient Instructions:   No discharge procedures on file.  Medications:  Reconciled Home Medications:      Medication List        START taking these medications      enoxaparin 80 mg/0.8 mL Syrg  Commonly known as: LOVENOX  Inject 0.8 mLs (80 mg total) into the skin once daily. for 5 days            CHANGE how you take these medications      bumetanide 1 MG tablet  Commonly known as: BUMEX  Take 1 tablet (1 mg total) by mouth every other day.  What changed: when to take this     warfarin 5 MG tablet  Commonly known as: COUMADIN  warfarin 7.5 (1.5 tablets) sun and Thursday, and 5mg other days. warfarin 7.5 (1.5 tablets) sun and Thursday, and  5mg other days  What changed: additional instructions            CONTINUE taking these medications      albuterol 90 mcg/actuation inhaler  Commonly known as: VENTOLIN HFA  Inhale 2 puffs into the lungs every 6 (six) hours as needed for Wheezing. Rescue     allopurinoL 100 MG tablet  Commonly known as: ZYLOPRIM  TAKE 1 TABLET EVERY DAY     ferrous gluconate 324 MG tablet  Commonly known as: FERGON  Take 1 tablet (324 mg total) by mouth daily with breakfast.     fish oil-omega-3 fatty acids 300-1,000 mg capsule  Take 1 capsule by mouth once daily.     isosorbide mononitrate 60 MG 24 hr tablet  Commonly known as: IMDUR  Take 60 mg by mouth once daily.     loperamide 2 mg capsule  Commonly known as: IMODIUM  Take 2 mg by mouth 4 (four) times daily as needed for Diarrhea.     metoprolol succinate 25 MG 24 hr tablet  Commonly known as: TOPROL-XL  Take 1 tablet (25 mg total) by mouth once daily.     NASAL DECONGESTANT (OXYMETAZL) 0.05 % nasal spray  Generic drug: oxymetazoline  Use 2 sprays by Nasal route daily as needed (nose bleed).     rosuvastatin 40 MG Tab  Commonly known as: CRESTOR  TAKE 1 TABLET EVERY EVENING.            STOP taking these medications      lisinopriL 2.5 MG tablet  Commonly known as: PRINJORGEZESTRIL              Time spent on the discharge of patient: 30 minutes    Dany Cortez MD  Cardiology  [facility] - [facility] Step[facility]

## 2023-04-15 NOTE — NURSING
Patient discharged home. Discharge instructions reviewed with patient and wife. PIV, telemetry, and morrison removed. Patient wheeled to parking garage via wheelchair. All personal belongings with patient.

## 2023-04-15 NOTE — PROGRESS NOTES
[facility] - [facility] Step[facility]  Cardiology  Progress Note    Patient Name: Dariel Urbina  MRN: [id number]  Admission Date: 4/11/2023  Hospital Length of Stay: 1 days  Code Status: Full Code   Attending Physician: Erika Carmen MD   Primary Care Physician: Devon Langston MD  Expected Discharge Date: 4/14/2023  Principal Problem:Supratherapeutic INR    Subjective:     Hospital Course:   Admitted to CCU to monitor INR for KIRSTEN.  Vit K given.  Also with elevated Cr, somewhat improving, however unclear etiology, nephrology consulted.  Renal ultrasound showing nonobstructive bladder mass, urology consulted.  Unable to obtain recommened CT with contrast due to renal function.  INR subtherapeutic after vit K, started on bridging heparin drip.  KIRSTEN performed 4/14.      Interval History: NAEON. Pt remains stable and denies any issues at this time. INR 1.5 today. KIRSTEN completed yesterday with no issues.     Review of Systems   Constitutional: Negative for fever and malaise/fatigue.   Eyes:  Negative for blurred vision and pain.   Cardiovascular:  Negative for chest pain, dyspnea on exertion, leg swelling, orthopnea, palpitations and paroxysmal nocturnal dyspnea.   Respiratory:  Negative for cough, shortness of breath, sputum production and wheezing.    Hematologic/Lymphatic: Negative for adenopathy and bleeding problem.   Skin:  Negative for rash.   Musculoskeletal:  Negative for back pain and neck pain.   Gastrointestinal:  Negative for abdominal pain, constipation, diarrhea, nausea and vomiting.   Genitourinary:  Negative for dysuria.   Neurological:  Negative for dizziness, headaches, light-headedness and weakness.   Objective:     Vital Signs (Most Recent):  Temp: 98.3 °F (36.8 °C) (04/15/23 0739)  Pulse: (!) 53 (04/15/23 0739)  Resp: 18 (04/15/23 0739)  BP: (!) 142/67 (04/15/23 0739)  SpO2: 97 % (04/15/23 0739)   Vital Signs (24h Range):  Temp:  [97.7 °F (36.5 °C)-98.4 °F (36.9 °C)] 98.3 °F (36.8 °C)  Pulse:   [53-74] 53  Resp:  [12-19] 18  SpO2:  [95 %-99 %] 97 %  BP: (115-157)/(56-92) 142/67     Weight: 73.5 kg (162 lb)  Body mass index is 26.96 kg/m².     SpO2: 97 %         Intake/Output Summary (Last 24 hours) at 4/15/2023 0855  Last data filed at 4/15/2023 0447  Gross per 24 hour   Intake 480 ml   Output 1000 ml   Net -520 ml       Lines/Drains/Airways       Drain  Duration                  Urethral Catheter 04/13/23 1600 1 day              Peripheral Intravenous Line  Duration                  Peripheral IV - Single Lumen 04/11/23 0823 20 G Left Antecubital 4 days                    Physical Exam  Constitutional:       General: He is not in acute distress.     Appearance: Normal appearance. He is not ill-appearing, toxic-appearing or diaphoretic.   HENT:      Head: Normocephalic and atraumatic.      Nose: Nose normal.   Eyes:      Extraocular Movements: Extraocular movements intact.      Pupils: Pupils are equal, round, and reactive to light.   Cardiovascular:      Rate and Rhythm: Normal rate and regular rhythm.      Heart sounds: No murmur heard.    No friction rub. No gallop.   Pulmonary:      Effort: Pulmonary effort is normal. No respiratory distress.      Breath sounds: Normal breath sounds. No wheezing or rales.   Abdominal:      General: Abdomen is flat. There is no distension.      Palpations: Abdomen is soft. There is no mass.      Tenderness: There is no abdominal tenderness.   Musculoskeletal:         General: No swelling. Normal range of motion.      Cervical back: Normal range of motion. No rigidity.      Right lower leg: No edema.      Left lower leg: No edema.   Skin:     General: Skin is warm and dry.      Coloration: Skin is not jaundiced.      Findings: No bruising.   Neurological:      General: No focal deficit present.      Mental Status: He is alert and oriented to person, place, and time.      Cranial Nerves: No cranial nerve deficit.      Motor: No weakness.       Significant Labs: BMP:    Recent Labs   Lab 04/14/23  0703 04/15/23  0312   GLU 94 87    136   K 4.3 4.5    110   CO2 19* 17*   BUN 69* 57*   CREATININE 2.7* 2.4*   CALCIUM 10.5 10.2   MG 1.8 1.7    and CBC   Recent Labs   Lab 04/14/23  0703 04/15/23  0312   WBC 5.08 4.69   HGB 9.8* 9.3*   HCT 29.7* 28.9*    156       Significant Imaging: Reviewed     Assessment and Plan:       * Supratherapeutic INR  INR 4.9 on admission, with recent changes by coumadin clinic.  Previously therapeutic one week prior to admission.    - restart warfarin  - gave 1 dose vit k, inr now subtherapeutic, started on hep drip   - INR 1.5  Today, will continue to bridge to warfarin     Mitral insufficiency  Presented for outpt KIRSTEN for mitralclip eval, however INR supratherapeutic (4.9) on presentation.  - will hold warfarin, trend INR until therapeutic  - keep on tele  - KIRSTEN on 4/14/23 with moderate MR. Will follow up with Dr. Garnica as outpatient     Chronic combined systolic and diastolic heart failure    Lab Results   Component Value Date    TROPONINI 0.164 (H) 12/29/2022     (H) 04/12/2023     Results for orders placed during the hospital encounter of 12/29/22  Echo  Interpretation Summary  · The left ventricle is normal in size with mild concentric hypertrophy and mildly decreased systolic function.  · The estimated ejection fraction is 48%.  · There are segmental left ventricular wall motion abnormalities.  · There is abnormal septal wall motion.  · Grade III left ventricular diastolic dysfunction.  · Severe left atrial enlargement.  · Normal right ventricular size with normal right ventricular systolic function.  · Mild right atrial enlargement.  · There is a mechanical aortic valve present.  · The aortic valve mean gradient is 17 mmHg with a dimensionless index of 0.38.  · Moderate to moderate -severe ( 2-3+) MR  · Moderate tricuspid regurgitation.  · Normal central venous pressure (3 mmHg).  · The estimated PA systolic pressure is  62 mmHg.  · There is at least moderate pulmonary hypertension.  - appears compensated on exam  - hold home bumex per nephro for renal function  - hold ACE for JIM  - Cardiac diet, fluid restriction at 1500 cc with strict I/Os and daily standing weights  - Maintain Mg >2, K >4    Acute renal failure superimposed on stage 3 chronic kidney disease  Baseline Cr 1.8-2.2.    Lab Results   Component Value Date    BUN 69 (H) 04/14/2023    CREATININE 2.7 (H) 04/14/2023   - nephro consulted  - renal ultrasound without hydro, however does show nonobstructive nonspecific bladder mass  - hold home lisinopril  - strict I/O  - CMP qd, trend Cr  - renally dose all medications  - avoid nephrotoxic agents, NSAIDs, IV contrast, ACE/ARB  - holding bumex today      VTE Risk Mitigation (From admission, onward)         Ordered     warfarin (COUMADIN) tablet 5 mg  Once per day on Mon Wed Fri Sat         04/14/23 1003     warfarin (COUMADIN) tablet 7.5 mg  Once per day on Sun Tue Thu         04/13/23 1026     heparin 25,000 units in dextrose 5% (100 units/ml) IV bolus from bag - ADDITIONAL PRN BOLUS - 60 units/kg  As needed (PRN)        Question:  Heparin Infusion Adjustment (DO NOT MODIFY ANSWER)  Answer:  [email or web address]\Images\Pharmacy\HeparinInfusions\heparin LOW INTENSITY nomogram for [facility] [id number]    04/14/23 0857     heparin 25,000 units in dextrose 5% (100 units/ml) IV bolus from bag - ADDITIONAL PRN BOLUS - 30 units/kg  As needed (PRN)        Question:  Heparin Infusion Adjustment (DO NOT MODIFY ANSWER)  Answer:  \[email or web address]\Images\Pharmacy\HeparinInfusions\heparin LOW INTENSITY nomogram for [facility] [id number]    04/14/23 0857     heparin 25,000 units in dextrose 5% (100 units/ml) IV bolus from bag INITIAL BOLUS  Once        Question:  Heparin Infusion Adjustment (DO NOT MODIFY ANSWER)  Answer:  \[email or web address]\Images\Pharmacy\HeparinInfusions\heparin LOW INTENSITY nomogram for [facility] [id number].pdf    04/14/23 0857     heparin  25,000 units in dextrose 5% 250 mL (100 units/mL) infusion LOW INTENSITY nomogram - [facility]  Continuous        Question Answer Comment   Heparin Infusion Adjustment (DO NOT MODIFY ANSWER) [email or web address]\[email or web address]\Images\Pharmacy\HeparinInfusions\heparin LOW INTENSITY nomogram for [facility] [id number].pdf    Begin at (in units/kg/hr) 12        04/14/23 0857     Reason for No Pharmacological VTE Prophylaxis  Once        Question:  Reasons:  Answer:  Risk of Bleeding    04/11/23 1448     IP VTE HIGH RISK PATIENT  Once         04/11/23 1448     Place sequential compression device  Until discontinued         04/11/23 1448                Dany Cortez MD  Cardiology  [facility] - Cardiology Stepdown

## 2023-04-17 NOTE — PLAN OF CARE
04/17/23 0931   Final Note   Assessment Type Final Discharge Note   Anticipated Discharge Disposition Home   What phone number can be called within the next 1-3 days to see how you are doing after discharge? [phone]   Hospital Resources/Appts/Education Provided Provided patient/caregiver with written discharge plan information;Appointments scheduled and added to AVS;Appointments scheduled by Navigator/Coordinator   Post-Acute Status   Discharge Delays None known at this time     Patient discharged home / self care.      Future Appointments   Date Time Provider Department Center   4/18/2023  9:30 AM Mine Hazel PA-C [facility]   5/4/2023  9:00 AM PRE-ADMIT, ENDO -[facility] ENDOPP4 [facility]   5/9/2023  8:30 AM [facility]   5/16/2023  9:40 AM Devon Langston Jr., MD [facility]     Rosa Lees RN    [phone]

## 2023-04-17 NOTE — ANESTHESIA POSTPROCEDURE EVALUATION
Anesthesia Post Evaluation    Patient: Dariel Urbina    Procedure(s) Performed: Procedure(s) (LRB):  Transesophageal echo (KIRSTEN) intra-procedure log documentation (N/A)    Final Anesthesia Type: general      Patient location during evaluation: PACU  Patient participation: Yes- Able to Participate  Level of consciousness: awake and alert  Post-procedure vital signs: reviewed and stable  Pain management: adequate  Airway patency: patent    PONV status at discharge: No PONV  Anesthetic complications: no      Cardiovascular status: blood pressure returned to baseline  Respiratory status: unassisted  Hydration status: euvolemic  Follow-up not needed.          Vitals Value Taken Time   /67 04/15/23 1139   Temp 36.7 °C (98 °F) 04/15/23 1139   Pulse 61 04/15/23 1139   Resp 18 04/15/23 1139   SpO2 92 % 04/15/23 1139         No case tracking events are documented in the log.      Pain/Snajeev Score: No data recorded

## 2023-04-17 NOTE — PROGRESS NOTES
4/17/23 Wife called to report the Patient was discharged from the hospital 4/15, wife stated Patient took warfarin: 7.5mg Saturday and Sunday, no other changes, Patient has appointment 4/18 inquiring if lab can be done then and what dose to take tonight

## 2023-04-18 ENCOUNTER — OFFICE VISIT (OUTPATIENT)
Dept: INTERNAL MEDICINE | Facility: CLINIC | Age: 83
End: 2023-04-18
Payer: MEDICARE

## 2023-04-18 ENCOUNTER — ANTI-COAG VISIT (OUTPATIENT)
Dept: CARDIOLOGY | Facility: CLINIC | Age: 83
End: 2023-04-18
Payer: MEDICARE

## 2023-04-18 ENCOUNTER — LAB VISIT (OUTPATIENT)
Dept: LAB | Facility: HOSPITAL | Age: 83
End: 2023-04-18
Payer: MEDICARE

## 2023-04-18 VITALS
BODY MASS INDEX: 28.16 KG/M2 | SYSTOLIC BLOOD PRESSURE: 140 MMHG | DIASTOLIC BLOOD PRESSURE: 50 MMHG | HEART RATE: 52 BPM | OXYGEN SATURATION: 98 % | HEIGHT: 65 IN | WEIGHT: 169 LBS

## 2023-04-18 DIAGNOSIS — E11.69 HYPERLIPIDEMIA ASSOCIATED WITH TYPE 2 DIABETES MELLITUS: ICD-10-CM

## 2023-04-18 DIAGNOSIS — Z79.01 ANTICOAGULANT LONG-TERM USE: ICD-10-CM

## 2023-04-18 DIAGNOSIS — R04.0 EPISTAXIS: ICD-10-CM

## 2023-04-18 DIAGNOSIS — Z79.01 ANTICOAGULANT LONG-TERM USE: Primary | ICD-10-CM

## 2023-04-18 DIAGNOSIS — E78.5 HYPERLIPIDEMIA ASSOCIATED WITH TYPE 2 DIABETES MELLITUS: ICD-10-CM

## 2023-04-18 DIAGNOSIS — Z95.2 H/O MECHANICAL AORTIC VALVE REPLACEMENT: ICD-10-CM

## 2023-04-18 DIAGNOSIS — I50.42 CHRONIC COMBINED SYSTOLIC AND DIASTOLIC HEART FAILURE: ICD-10-CM

## 2023-04-18 DIAGNOSIS — E11.59 HYPERTENSION ASSOCIATED WITH DIABETES: ICD-10-CM

## 2023-04-18 DIAGNOSIS — N18.4 STAGE 4 CHRONIC KIDNEY DISEASE: Primary | ICD-10-CM

## 2023-04-18 DIAGNOSIS — Z79.01 LONG TERM CURRENT USE OF ANTICOAGULANT THERAPY: ICD-10-CM

## 2023-04-18 DIAGNOSIS — N32.89 BLADDER MASS: ICD-10-CM

## 2023-04-18 DIAGNOSIS — I15.2 HYPERTENSION ASSOCIATED WITH DIABETES: ICD-10-CM

## 2023-04-18 LAB
INR PPP: 2.6 (ref 0.8–1.2)
PROTHROMBIN TIME: 25.9 SEC (ref 9–12.5)

## 2023-04-18 PROCEDURE — 1111F DSCHRG MED/CURRENT MED MERGE: CPT | Mod: HCNC,CPTII,S$GLB, | Performed by: PHYSICIAN ASSISTANT

## 2023-04-18 PROCEDURE — 3072F PR LOW RISK FOR RETINOPATHY: ICD-10-PCS | Mod: HCNC,CPTII,S$GLB, | Performed by: PHYSICIAN ASSISTANT

## 2023-04-18 PROCEDURE — 3078F DIAST BP <80 MM HG: CPT | Mod: HCNC,CPTII,S$GLB, | Performed by: PHYSICIAN ASSISTANT

## 2023-04-18 PROCEDURE — 3288F FALL RISK ASSESSMENT DOCD: CPT | Mod: HCNC,CPTII,S$GLB, | Performed by: PHYSICIAN ASSISTANT

## 2023-04-18 PROCEDURE — 1126F AMNT PAIN NOTED NONE PRSNT: CPT | Mod: HCNC,CPTII,S$GLB, | Performed by: PHYSICIAN ASSISTANT

## 2023-04-18 PROCEDURE — 3078F PR MOST RECENT DIASTOLIC BLOOD PRESSURE < 80 MM HG: ICD-10-PCS | Mod: HCNC,CPTII,S$GLB, | Performed by: PHYSICIAN ASSISTANT

## 2023-04-18 PROCEDURE — 99214 OFFICE O/P EST MOD 30 MIN: CPT | Mod: HCNC,S$GLB,, | Performed by: PHYSICIAN ASSISTANT

## 2023-04-18 PROCEDURE — 3077F SYST BP >= 140 MM HG: CPT | Mod: HCNC,CPTII,S$GLB, | Performed by: PHYSICIAN ASSISTANT

## 2023-04-18 PROCEDURE — 85610 PROTHROMBIN TIME: CPT | Mod: HCNC | Performed by: INTERNAL MEDICINE

## 2023-04-18 PROCEDURE — 93793 PR ANTICOAGULANT MGMT FOR PT TAKING WARFARIN: ICD-10-PCS | Mod: S$GLB,,,

## 2023-04-18 PROCEDURE — 1126F PR PAIN SEVERITY QUANTIFIED, NO PAIN PRESENT: ICD-10-PCS | Mod: HCNC,CPTII,S$GLB, | Performed by: PHYSICIAN ASSISTANT

## 2023-04-18 PROCEDURE — 36415 COLL VENOUS BLD VENIPUNCTURE: CPT | Mod: HCNC | Performed by: INTERNAL MEDICINE

## 2023-04-18 PROCEDURE — 3288F PR FALLS RISK ASSESSMENT DOCUMENTED: ICD-10-PCS | Mod: HCNC,CPTII,S$GLB, | Performed by: PHYSICIAN ASSISTANT

## 2023-04-18 PROCEDURE — 1111F PR DISCHARGE MEDS RECONCILED W/ CURRENT OUTPATIENT MED LIST: ICD-10-PCS | Mod: HCNC,CPTII,S$GLB, | Performed by: PHYSICIAN ASSISTANT

## 2023-04-18 PROCEDURE — 93793 ANTICOAG MGMT PT WARFARIN: CPT | Mod: S$GLB,,,

## 2023-04-18 PROCEDURE — 99999 PR PBB SHADOW E&M-EST. PATIENT-LVL V: CPT | Mod: PBBFAC,HCNC,, | Performed by: PHYSICIAN ASSISTANT

## 2023-04-18 PROCEDURE — 1101F PR PT FALLS ASSESS DOC 0-1 FALLS W/OUT INJ PAST YR: ICD-10-PCS | Mod: HCNC,CPTII,S$GLB, | Performed by: PHYSICIAN ASSISTANT

## 2023-04-18 PROCEDURE — 99214 PR OFFICE/OUTPT VISIT, EST, LEVL IV, 30-39 MIN: ICD-10-PCS | Mod: HCNC,S$GLB,, | Performed by: PHYSICIAN ASSISTANT

## 2023-04-18 PROCEDURE — 1101F PT FALLS ASSESS-DOCD LE1/YR: CPT | Mod: HCNC,CPTII,S$GLB, | Performed by: PHYSICIAN ASSISTANT

## 2023-04-18 PROCEDURE — 3072F LOW RISK FOR RETINOPATHY: CPT | Mod: HCNC,CPTII,S$GLB, | Performed by: PHYSICIAN ASSISTANT

## 2023-04-18 PROCEDURE — 99999 PR PBB SHADOW E&M-EST. PATIENT-LVL V: ICD-10-PCS | Mod: PBBFAC,HCNC,, | Performed by: PHYSICIAN ASSISTANT

## 2023-04-18 PROCEDURE — 3077F PR MOST RECENT SYSTOLIC BLOOD PRESSURE >= 140 MM HG: ICD-10-PCS | Mod: HCNC,CPTII,S$GLB, | Performed by: PHYSICIAN ASSISTANT

## 2023-04-18 NOTE — PATIENT INSTRUCTIONS
Urology - follow up on bladder mass    Cardiology - Dr. Garnica for follow up on heart    Nephrology - for follow up on kidneys

## 2023-04-18 NOTE — PROGRESS NOTES
"Subjective:       Patient ID: Dariel Urbina is a 82 y.o. male.        Chief Complaint: Hospital Follow Up    Dariel Urbina is an established patient of Devon Langston MD here today for hospital follow up visit.    He was admitted for KIRSTEN for evaluation for MitraClip.  KIRSTEN performed 4/14 and showed moderate MR.  His INR was initially supra therapeutic, then subtherapeutic, so d/c on lovenox bridge.  Coumadin clinic is following and INR for today pending.       SCC of scalp s/p resection 2/2023    CAD s/p CABG/PCI - PET stress 1/2023 positive RCA/Lcx territory, imdur 60 mg, toprol XL 25 mg    Mechanical AV on coumadin    Recurrent nosebleeds followed by Dr. Russell with upcoming appointment scheduled    Heart failure -  Bumex 1 mg ever other day  Toprol XL 25 mg    CKD -   Needs nephrology follow up appointment    HTN -  Toprol XL 25 mg  BP Readings from Last 5 Encounters:  04/18/23 : (!) 140/50  04/15/23 : (!) 145/67  03/22/23 : (!) 156/81  03/09/23 : 133/73  03/04/23 : 131/62    Gout - no recent flares    Lipids -   Crestor 40 mg     Indeterminate bladder lesion found incidentally, saw Dr. Manuel while admitted and thought to be bowel, needs urology f/u scheduled outpatient  "-Ultrasound shows no concerning  lesions, particularly no lesions within the bladder. The solid focus anterior to the bladder was non-palpable on exam, could represent bowel. No further workup necessary from Urology standpoint."         Review of Systems   Constitutional:  Negative for appetite change, chills, fatigue and fever.   HENT:  Negative for congestion and sore throat.    Eyes:  Negative for visual disturbance.   Respiratory:  Negative for cough, chest tightness and shortness of breath.    Cardiovascular:  Negative for chest pain, palpitations and leg swelling.   Gastrointestinal:  Negative for abdominal pain, blood in stool, constipation, diarrhea, nausea and vomiting.   Genitourinary:  Negative for dysuria, frequency, " hematuria and urgency.   Musculoskeletal:  Negative for arthralgias and back pain.   Skin:  Negative for rash.   Neurological:  Negative for dizziness, syncope, weakness and headaches.   Psychiatric/Behavioral:  Negative for dysphoric mood and sleep disturbance. The patient is not nervous/anxious.      Objective:      Physical Exam  Vitals and nursing note reviewed.   Constitutional:       Appearance: He is well-developed.   HENT:      Head: Normocephalic.      Right Ear: External ear normal.      Left Ear: External ear normal.   Eyes:      Pupils: Pupils are equal, round, and reactive to light.   Cardiovascular:      Rate and Rhythm: Normal rate and regular rhythm.      Heart sounds: Normal heart sounds. No murmur heard.    No friction rub. No gallop.      Comments: Mechanical valve   Pulmonary:      Effort: Pulmonary effort is normal. No respiratory distress.      Breath sounds: Normal breath sounds.   Abdominal:      Palpations: Abdomen is soft.      Tenderness: There is no abdominal tenderness.   Musculoskeletal:         General: No swelling.   Skin:     General: Skin is warm and dry.   Neurological:      General: No focal deficit present.      Mental Status: He is alert.   Psychiatric:         Mood and Affect: Mood normal.       Assessment:       1. Stage 4 chronic kidney disease    2. Bladder mass    3. Epistaxis    4. Hypertension associated with diabetes    5. H/O mechanical aortic valve replacement    6. Hyperlipidemia associated with type 2 diabetes mellitus    7. Chronic combined systolic and diastolic heart failure    8. Anticoagulant long-term use        Plan:       Dariel was seen today for hospital follow up.    Diagnoses and all orders for this visit:    Stage 4 chronic kidney disease  -     Basic Metabolic Panel; Future  -     Ambulatory referral/consult to Nephrology; Future    Bladder mass  -     Ambulatory referral/consult to Urology; Future    Epistaxis - recurrent issue, has upcoming appointment  "with Dr. Russell    Hypertension associated with diabetes - stable and controlled, continue current regimen    H/O mechanical aortic valve replacement - on coumadin    Hyperlipidemia associated with type 2 diabetes mellitus - stable and controlled, continue current regimen  Lab Results   Component Value Date    CHOL 115 (L) 02/28/2023    TRIG 155 (H) 02/28/2023    HDL 37 (L) 02/28/2023    LDLCALC 47.0 (L) 02/28/2023     Chronic combined systolic and diastolic heart failure - watch salt, check weights daily, no LE edema, monitor  Wt Readings from Last 4 Encounters:   04/18/23 76.6 kg (168 lb 15.7 oz)   04/14/23 73.5 kg (162 lb)   03/22/23 77.6 kg (171 lb)   03/09/23 76 kg (167 lb 8.8 oz)     Anticoagulant long-term use    Will close the loop with urology f/u outpatient  Refer to nephrology  Schedule f/u with Dr. Garnica and PCP    Pt has been given instructions populated from patient instructions database and has verbalized understanding of the after visit summary and information contained wherein.    Follow up with a primary care provider. May go to ER for acute shortness of breath, lightheadedness, fever, or any other emergent complaints or changes in condition.    "This note will be shared with the patient"    Future Appointments   Date Time Provider Department Center   4/24/2023  7:45 AM Adonis Jane Jr., MD [facility] UROLOGY [facility]   4/25/2023  8:30 AM Devon Garnica MD [facility] CARDVAL [facility]   4/25/2023  9:35 AM LAB, APPOINTMENT [facility] LAB VNP [facility]   5/4/2023  9:00 AM PRE-ADMIT, ENDO -[facility] ENDOPP4 [facility]   5/9/2023  8:30 AM Jono Russell MD [facility] HNSO [facility]   5/16/2023  9:40 AM Devon Langston Jr., MD [facility] IM [facility] PCW                 "

## 2023-04-19 ENCOUNTER — PATIENT MESSAGE (OUTPATIENT)
Dept: UROLOGY | Facility: CLINIC | Age: 83
End: 2023-04-19
Payer: MEDICARE

## 2023-04-24 ENCOUNTER — OFFICE VISIT (OUTPATIENT)
Dept: UROLOGY | Facility: CLINIC | Age: 83
End: 2023-04-24
Payer: MEDICARE

## 2023-04-24 ENCOUNTER — TELEPHONE (OUTPATIENT)
Dept: INTERNAL MEDICINE | Facility: CLINIC | Age: 83
End: 2023-04-24
Payer: MEDICARE

## 2023-04-24 VITALS
SYSTOLIC BLOOD PRESSURE: 129 MMHG | HEIGHT: 65 IN | DIASTOLIC BLOOD PRESSURE: 63 MMHG | HEART RATE: 65 BPM | WEIGHT: 171 LBS | BODY MASS INDEX: 28.49 KG/M2

## 2023-04-24 DIAGNOSIS — N32.89 BLADDER MASS: ICD-10-CM

## 2023-04-24 PROCEDURE — 1101F PT FALLS ASSESS-DOCD LE1/YR: CPT | Mod: HCNC,CPTII,S$GLB, | Performed by: STUDENT IN AN ORGANIZED HEALTH CARE EDUCATION/TRAINING PROGRAM

## 2023-04-24 PROCEDURE — 99999 PR PBB SHADOW E&M-EST. PATIENT-LVL IV: CPT | Mod: PBBFAC,HCNC,, | Performed by: STUDENT IN AN ORGANIZED HEALTH CARE EDUCATION/TRAINING PROGRAM

## 2023-04-24 PROCEDURE — 1126F AMNT PAIN NOTED NONE PRSNT: CPT | Mod: HCNC,CPTII,S$GLB, | Performed by: STUDENT IN AN ORGANIZED HEALTH CARE EDUCATION/TRAINING PROGRAM

## 2023-04-24 PROCEDURE — 1101F PR PT FALLS ASSESS DOC 0-1 FALLS W/OUT INJ PAST YR: ICD-10-PCS | Mod: HCNC,CPTII,S$GLB, | Performed by: STUDENT IN AN ORGANIZED HEALTH CARE EDUCATION/TRAINING PROGRAM

## 2023-04-24 PROCEDURE — 3074F PR MOST RECENT SYSTOLIC BLOOD PRESSURE < 130 MM HG: ICD-10-PCS | Mod: HCNC,CPTII,S$GLB, | Performed by: STUDENT IN AN ORGANIZED HEALTH CARE EDUCATION/TRAINING PROGRAM

## 2023-04-24 PROCEDURE — 3288F PR FALLS RISK ASSESSMENT DOCUMENTED: ICD-10-PCS | Mod: HCNC,CPTII,S$GLB, | Performed by: STUDENT IN AN ORGANIZED HEALTH CARE EDUCATION/TRAINING PROGRAM

## 2023-04-24 PROCEDURE — 3072F LOW RISK FOR RETINOPATHY: CPT | Mod: HCNC,CPTII,S$GLB, | Performed by: STUDENT IN AN ORGANIZED HEALTH CARE EDUCATION/TRAINING PROGRAM

## 2023-04-24 PROCEDURE — 1159F PR MEDICATION LIST DOCUMENTED IN MEDICAL RECORD: ICD-10-PCS | Mod: HCNC,CPTII,S$GLB, | Performed by: STUDENT IN AN ORGANIZED HEALTH CARE EDUCATION/TRAINING PROGRAM

## 2023-04-24 PROCEDURE — 3078F DIAST BP <80 MM HG: CPT | Mod: HCNC,CPTII,S$GLB, | Performed by: STUDENT IN AN ORGANIZED HEALTH CARE EDUCATION/TRAINING PROGRAM

## 2023-04-24 PROCEDURE — 3072F PR LOW RISK FOR RETINOPATHY: ICD-10-PCS | Mod: HCNC,CPTII,S$GLB, | Performed by: STUDENT IN AN ORGANIZED HEALTH CARE EDUCATION/TRAINING PROGRAM

## 2023-04-24 PROCEDURE — 99213 OFFICE O/P EST LOW 20 MIN: CPT | Mod: HCNC,S$GLB,, | Performed by: STUDENT IN AN ORGANIZED HEALTH CARE EDUCATION/TRAINING PROGRAM

## 2023-04-24 PROCEDURE — 1160F PR REVIEW ALL MEDS BY PRESCRIBER/CLIN PHARMACIST DOCUMENTED: ICD-10-PCS | Mod: HCNC,CPTII,S$GLB, | Performed by: STUDENT IN AN ORGANIZED HEALTH CARE EDUCATION/TRAINING PROGRAM

## 2023-04-24 PROCEDURE — 3288F FALL RISK ASSESSMENT DOCD: CPT | Mod: HCNC,CPTII,S$GLB, | Performed by: STUDENT IN AN ORGANIZED HEALTH CARE EDUCATION/TRAINING PROGRAM

## 2023-04-24 PROCEDURE — 1159F MED LIST DOCD IN RCRD: CPT | Mod: HCNC,CPTII,S$GLB, | Performed by: STUDENT IN AN ORGANIZED HEALTH CARE EDUCATION/TRAINING PROGRAM

## 2023-04-24 PROCEDURE — 3078F PR MOST RECENT DIASTOLIC BLOOD PRESSURE < 80 MM HG: ICD-10-PCS | Mod: HCNC,CPTII,S$GLB, | Performed by: STUDENT IN AN ORGANIZED HEALTH CARE EDUCATION/TRAINING PROGRAM

## 2023-04-24 PROCEDURE — 3074F SYST BP LT 130 MM HG: CPT | Mod: HCNC,CPTII,S$GLB, | Performed by: STUDENT IN AN ORGANIZED HEALTH CARE EDUCATION/TRAINING PROGRAM

## 2023-04-24 PROCEDURE — 1126F PR PAIN SEVERITY QUANTIFIED, NO PAIN PRESENT: ICD-10-PCS | Mod: HCNC,CPTII,S$GLB, | Performed by: STUDENT IN AN ORGANIZED HEALTH CARE EDUCATION/TRAINING PROGRAM

## 2023-04-24 PROCEDURE — 99999 PR PBB SHADOW E&M-EST. PATIENT-LVL IV: ICD-10-PCS | Mod: PBBFAC,HCNC,, | Performed by: STUDENT IN AN ORGANIZED HEALTH CARE EDUCATION/TRAINING PROGRAM

## 2023-04-24 PROCEDURE — 1160F RVW MEDS BY RX/DR IN RCRD: CPT | Mod: HCNC,CPTII,S$GLB, | Performed by: STUDENT IN AN ORGANIZED HEALTH CARE EDUCATION/TRAINING PROGRAM

## 2023-04-24 PROCEDURE — 99213 PR OFFICE/OUTPT VISIT, EST, LEVL III, 20-29 MIN: ICD-10-PCS | Mod: HCNC,S$GLB,, | Performed by: STUDENT IN AN ORGANIZED HEALTH CARE EDUCATION/TRAINING PROGRAM

## 2023-04-24 NOTE — PROGRESS NOTES
[facility]  Urologic Oncology Clinic Note        Date of Service: 4/24/2023        Chief Complaint/Reason for Consultation: Bladder Mass    Requesting Provider:   Mine Hazel PA-C  [address] [place] [address]      History of Present Illness:   Patient 82 y.o. male presents with abnormal ultrasound finding of possible solid mass anterior to bladder.     No hematuria. Former smoker quit in the 80s.     Here today with his wife. No voiding complaints.     Urologic History:     4/13/2023 -- US retro complete -- possible solid mass anterior to bladder, medical renal disease    Patient Active Problem List    Diagnosis Date Noted    Mitral insufficiency 03/22/2023    Paroxysmal atrial fibrillation 02/06/2023    Chronic combined systolic and diastolic heart failure 12/31/2022    Acute decompensated heart failure 12/30/2022    Coronary artery disease involving native coronary artery of native heart without angina pectoris 12/29/2022    Epistaxis 12/21/2022    Supratherapeutic INR 12/21/2022    Syncope 09/29/2022    Chronic kidney disease (CKD), stage IV (severe) 02/10/2022    Weakness 02/03/2022    Anemia 02/03/2022    Atherosclerosis of native coronary artery of native heart without angina pectoris 09/29/2021    Severe carpal tunnel syndrome of right wrist 05/19/2020    Bilateral carpal tunnel syndrome 04/30/2020    Numbness and tingling in both hands 04/30/2020    Upper extremity weakness 09/25/2019    Hypertension associated with diabetes 09/25/2019    Hx of colonic polyp 07/28/2018    Gastrointestinal hemorrhage associated with intestinal diverticulosis 04/01/2018    Acute anterior epistaxis 03/21/2018    Metabolic acidosis with normal anion gap and bicarbonate losses 03/20/2018    Acute renal failure superimposed on stage 3 chronic kidney disease 03/19/2018    Pulmonary heart disease 02/20/2018    Diarrhea 03/31/2017    Bilateral carotid artery disease 02/09/2017    Stage 3b chronic kidney  disease 05/27/2015    Type 2 diabetes mellitus with diabetic peripheral angiopathy without gangrene 05/27/2015    H/O mechanical aortic valve replacement 09/17/2014    Atherosclerosis of native artery of extremity with intermittent claudication 09/17/2014    Anticoagulant long-term use 09/26/2012    Hyperlipidemia associated with type 2 diabetes mellitus 09/26/2012    History of colon resection 09/26/2012    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    History of gout 09/26/2012    Atherosclerosis of lower extremity with claudication 09/11/2012          Review of patient's allergies indicates:   Allergen Reactions    Fosinopril      Intolerance- elevates potassium level      Losartan      Intolerance- elevates potassium level        Past Medical History:   Diagnosis Date    Anemia of chronic renal failure, stage 3 (moderate) 05/27/2015    Anticoagulant long-term use     Atherosclerosis of coronary artery bypass graft of native heart without angina pectoris 09/11/2012    3-27-18 [facility] Two vessel coronary artery disease.   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    Bilateral carotid artery disease 02/09/2017    Bleeding from the nose     Bleeding nose 03/21/2018    Cancer     Cataract     CHF (congestive heart failure)     CKD (chronic kidney disease) stage 3, GFR 30-59 ml/min 05/27/2015    Claudication of left lower extremity 09/17/2014    Colon polyp     Encounter for blood transfusion     Gastroesophageal reflux disease without esophagitis 03/19/2018    Gastrointestinal hemorrhage associated with intestinal diverticulosis 04/01/2018    Glaucoma     H/O mechanical aortic valve replacement 09/17/2014    History of gout 09/26/2012    Hyperparathyroidism due to renal insufficiency 07/27/2015    Internal hemorrhoid 04/03/2018    Long term current use of anticoagulant therapy 09/26/2012    Mechanical heart valve present     Metabolic acidosis with normal anion gap and bicarbonate losses 03/20/2018     Mixed hyperlipidemia 09/26/2012    NSTEMI (non-ST elevated myocardial infarction) 03/21/2018    Obesity, diabetes, and hypertension syndrome 02/23/2016    Paroxysmal atrial fibrillation 02/06/2023    PVD (peripheral vascular disease) 09/11/2012    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      Past Surgical History:   Procedure Laterality Date    BACK SURGERY      CARDIAC CATHETERIZATION      CARDIAC VALVE REPLACEMENT      CARDIAC VALVE SURGERY      CARPAL TUNNEL RELEASE Right 05/19/2020    Procedure: RELEASE, CARPAL TUNNEL;  Surgeon: Rupesh Norris Jr., MD;  Location: [facility];  Service: Plastics;  Laterality: Right;    CATARACT EXTRACTION Left 11/13/2022        COLON SURGERY      COLONOSCOPY N/A 03/31/2017    Procedure: COLONOSCOPY;  Surgeon: Bruno Raymond MD;  Location: [facility] (4TH FLR);  Service: Endoscopy;  Laterality: N/A;  Patient's wife requesting date.    COLONOSCOPY N/A 04/03/2018    Procedure: COLONOSCOPY;  Surgeon: Bonifacio Pelletier MD;  Location: [facility] (2ND FLR);  Service: Endoscopy;  Laterality: N/A;    COLONOSCOPY N/A 08/13/2018    Procedure: COLONOSCOPY;  Surgeon: Kam Barba MD;  Location: [facility] (2ND FLR);  Service: Endoscopy;  Laterality: N/A;  2nd floor: PA pressure 49; hx of moderate-severe valve disease     per Coumadin clinic-Patient can hold 5 days with lovenox bridge       ok to schedule per Katarina    CORONARY ANGIOGRAPHY N/A 10/04/2021    Procedure: Left heart cath +/- peripheral angiogram;  Surgeon: Jose Ruiz MD;  Location: [facility] CATH LAB;  Service: Cardiology;  Laterality: N/A;    CORONARY ANGIOGRAPHY N/A 3/2/2023    Procedure: Angiogram, Coronary;  Surgeon: Devon  JILLIAN Garnica MD;  Location: [facility] CATH LAB;  Service: Cardiology;  Laterality: N/A;    CORONARY ANGIOPLASTY      CORONARY ARTERY BYPASS GRAFT      CORONARY BYPASS GRAFT ANGIOGRAPHY  10/04/2021    Procedure: Bypass graft study;  Surgeon: Jose Ruiz MD;  Location: [facility] CATH LAB;  Service: Cardiology;;    CORONARY BYPASS GRAFT ANGIOGRAPHY  3/2/2023    Procedure: Bypass graft study;  Surgeon: Devon Garnica MD;  Location: [facility] CATH LAB;  Service: Cardiology;;    ECHOCARDIOGRAM,TRANSESOPHAGEAL N/A 4/14/2023    Procedure: Transesophageal echo (KIRSTEN) intra-procedure log documentation;  Surgeon: [facility] Diagnostic Provider;  Location: [facility] EP LAB;  Service: Cardiology;  Laterality: N/A;    EYE SURGERY      HERNIA REPAIR      INTRAOCULAR PROSTHESES INSERTION Left 11/13/2022    Procedure: INSERTION, IOL PROSTHESIS;  Surgeon: Alia Mckeon MD;  Location: [address];  Service: Ophthalmology;  Laterality: Left;    INTRAOCULAR PROSTHESES INSERTION Right 12/04/2022    Procedure: INSERTION, IOL PROSTHESIS;  Surgeon: Alia Mckeon MD;  Location: [address];  Service: Ophthalmology;  Laterality: Right;    PHACOEMULSIFICATION OF CATARACT Left 11/13/2022    Procedure: PHACOEMULSIFICATION, CATARACT;  Surgeon: Alia Mckeon MD;  Location: [address];  Service: Ophthalmology;  Laterality: Left;    PHACOEMULSIFICATION OF CATARACT Right 12/04/2022    Procedure: PHACOEMULSIFICATION, CATARACT;  Surgeon: Alia Mckeon MD;  Location: [address];  Service: Ophthalmology;  Laterality: Right;    SPINE SURGERY      TRANSESOPHAGEAL ECHOCARDIOGRAPHY N/A 3/22/2023    Procedure: ECHOCARDIOGRAM, TRANSESOPHAGEAL;  Surgeon: [facility] Diagnostic Provider;  Location: [facility] EP LAB;  Service: Anesthesiology;  Laterality: N/A;    TRANSESOPHAGEAL ECHOCARDIOGRAPHY N/A 4/11/2023    Procedure: ECHOCARDIOGRAM, TRANSESOPHAGEAL;  Surgeon: [facility] Diagnostic Provider;  Location: [facility] EP LAB;  Service: Anesthesiology;  Laterality: N/A;     "VASECTOMY        Family History   Problem Relation Age of Onset    Heart failure Mother     Heart disease Mother     Heart failure Father     Heart disease Father     Alcohol abuse Father     Heart failure Brother     Heart disease Brother     Diabetes Brother     No Known Problems Sister     No Known Problems Maternal Grandmother     No Known Problems Maternal Grandfather     No Known Problems Paternal Grandmother     No Known Problems Paternal Grandfather     Heart disease Sister     No Known Problems Maternal Aunt     No Known Problems Maternal Uncle     No Known Problems Paternal Aunt     No Known Problems Paternal Uncle     Amblyopia Neg Hx     Blindness Neg Hx     Cancer Neg Hx     Cataracts Neg Hx     Glaucoma Neg Hx     Hypertension Neg Hx     Macular degeneration Neg Hx     Retinal detachment Neg Hx     Strabismus Neg Hx     Stroke Neg Hx     Thyroid disease Neg Hx     Anemia Neg Hx     Arrhythmia Neg Hx     Asthma Neg Hx     Clotting disorder Neg Hx     Fainting Neg Hx     Heart attack Neg Hx     Hyperlipidemia Neg Hx     Atrial Septal Defect Neg Hx     Melanoma Neg Hx       Social History     Tobacco Use    Smoking status: Former     Packs/day: 1.00     Years: 20.00     Pack years: 20.00     Types: Cigarettes     Quit date: 1980     Years since quittin.6     Passive exposure: Never    Smokeless tobacco: Never   Substance Use Topics    Alcohol use: No        Review of Systems   Constitutional:  Negative for activity change, fatigue and fever.   HENT:  Negative for congestion.    Respiratory:  Negative for cough.    Cardiovascular:  Negative for chest pain.   Gastrointestinal:  Negative for abdominal pain.   Genitourinary:  Negative for flank pain and hematuria.           OBJECTIVE:     Vitals:    23 1057   BP: 129/63   Pulse: 65   Weight: 77.6 kg (171 lb)   Height: 5' 5" (1.651 m)          General Appearance: Alert, cooperative, no distress  Head: Normocephalic  Eyes: Clear " conjunctiva  Neck: No obvious LND or JVD  Lungs: Normal chest excursion, no accessory muscle use  Chest: Regular rate rhythm by palpation, no pedal edema  Abdomen: Soft, non-tender, non-distended  Extremities: Atraumatic   Lymph Nodes: No appreciable lymph adenopathy  Neurologic: No gross gait, motor or sensory deficits            LAB:      BMP  Lab Results   Component Value Date     2023    K 5.1 2023     2023    CO2 19 (L) 2023    BUN 59 (H) 2023    CREATININE 2.3 (H) 2023    CALCIUM 10.7 (H) 2023    ANIONGAP 10 2023    EGFRNORACEVR 27.8 (A) 2023           IMAGIN/13/2023  US RETROPERITONEAL COMPLETE     CLINICAL HISTORY:  elevated cr;     TECHNIQUE:  Ultrasound of the kidneys and urinary bladder was performed including color flow and Doppler evaluation of the kidneys.     COMPARISON:  CT abdomen pelvis 2022.  Retroperitoneal ultrasound 2022.     FINDINGS:  Right kidney: The right kidney measures 10.2 cm.  Cortical thinning with loss of corticomedullary distinction.  Resistive index measures 1.0.  1.2 cm simple cyst at the interpolar region.  No solid renal mass.  No renal stone. No hydronephrosis.     Left kidney: The left kidney measures 10.5 cm.  Cortical thinning with loss of corticomedullary distinction.  Resistive index measures 1.0.  No mass. 3 mm nonobstructing stone within the superior pole.  No hydronephrosis.     The bladder is decompressed around a Ku catheter, limiting evaluation.  Rounded solid focus anterior to the bladder, measures 4.4 x 3.7 x 3.0 cm, without significant internal Doppler vascularity.     Splenic resistive index measures 0.76.     Impression:     Chronic medical renal disease.  No hydronephrosis.     3 mm left nonobstructing nephrolith.     Nonspecific 4.4 cm rounded solid focus anterior to the bladder, incompletely evaluated.  Recommend further evaluation with contrast-enhanced CT.     This  report was flagged in Epic as abnormal.     Electronically signed by resident: Jason Chapman  Date:                                            04/14/2023  Time:                                           08:07     Electronically signed by: Kyle Cameron  Date:                                            04/14/2023  Time:                                           08:21        ASSESSMENT/PLAN:     81 yo M w abnormal finding of possible mass anterior to bladder wall    Plan:    Today I discussed with the patient and his wife the most recent ultrasound findings showing possible mass anterior to his bladder. I informed them that radiology recommended CT scan. Plan for CT w/o contrast to evaluate possible solid mass.     I did inform patient that I thought mass was not likely in his bladder but anterior to the bladder. We also reviewed his urine analysis which did not show blood in his urine lowering the risk of possible bladder cancer.       The total time for the established patient visit was at least 20 minutes in both face-to-face and non-face-to-face activities, which included chart review, interpretation of results, counseling, education, ordering meds/tests/procedures, and/or coordination of care.         Francisco Nunez MD  Urologic Oncology  P: [phone]

## 2023-04-24 NOTE — TELEPHONE ENCOUNTER
----- Message from Mine Hazel PA-C sent at 4/19/2023  7:03 AM CDT -----  Creatinine (kidney function) stable compared to prior but needs nephrology follow up  Referral placed but not scheduled  Please assist and let patient/wife know

## 2023-04-24 NOTE — TELEPHONE ENCOUNTER
Called and discussed test results and recommendations with the pt's wife. The pt expressed understanding.     Pt is scheduled for Nephrology

## 2023-04-25 ENCOUNTER — ANTI-COAG VISIT (OUTPATIENT)
Dept: CARDIOLOGY | Facility: CLINIC | Age: 83
End: 2023-04-25
Payer: MEDICARE

## 2023-04-25 ENCOUNTER — LAB VISIT (OUTPATIENT)
Dept: LAB | Facility: HOSPITAL | Age: 83
End: 2023-04-25
Payer: MEDICARE

## 2023-04-25 ENCOUNTER — OFFICE VISIT (OUTPATIENT)
Dept: CARDIOLOGY | Facility: CLINIC | Age: 83
End: 2023-04-25
Payer: MEDICARE

## 2023-04-25 VITALS
WEIGHT: 171.31 LBS | DIASTOLIC BLOOD PRESSURE: 70 MMHG | SYSTOLIC BLOOD PRESSURE: 168 MMHG | HEART RATE: 65 BPM | BODY MASS INDEX: 28.54 KG/M2 | OXYGEN SATURATION: 99 % | HEIGHT: 65 IN

## 2023-04-25 DIAGNOSIS — I25.10 ATHEROSCLEROSIS OF NATIVE CORONARY ARTERY OF NATIVE HEART WITHOUT ANGINA PECTORIS: ICD-10-CM

## 2023-04-25 DIAGNOSIS — I34.0 MITRAL VALVE INSUFFICIENCY, UNSPECIFIED ETIOLOGY: ICD-10-CM

## 2023-04-25 DIAGNOSIS — Z79.01 ANTICOAGULANT LONG-TERM USE: Primary | ICD-10-CM

## 2023-04-25 DIAGNOSIS — Z95.2 H/O MECHANICAL AORTIC VALVE REPLACEMENT: ICD-10-CM

## 2023-04-25 DIAGNOSIS — N18.4 CHRONIC KIDNEY DISEASE (CKD), STAGE IV (SEVERE): ICD-10-CM

## 2023-04-25 DIAGNOSIS — Z79.01 LONG TERM CURRENT USE OF ANTICOAGULANT THERAPY: ICD-10-CM

## 2023-04-25 LAB
INR PPP: 1.4 (ref 0.8–1.2)
PROTHROMBIN TIME: 13.9 SEC (ref 9–12.5)

## 2023-04-25 PROCEDURE — 99215 OFFICE O/P EST HI 40 MIN: CPT | Mod: HCNC,S$GLB,, | Performed by: INTERNAL MEDICINE

## 2023-04-25 PROCEDURE — 99215 PR OFFICE/OUTPT VISIT, EST, LEVL V, 40-54 MIN: ICD-10-PCS | Mod: HCNC,S$GLB,, | Performed by: INTERNAL MEDICINE

## 2023-04-25 PROCEDURE — 1160F PR REVIEW ALL MEDS BY PRESCRIBER/CLIN PHARMACIST DOCUMENTED: ICD-10-PCS | Mod: HCNC,CPTII,S$GLB, | Performed by: INTERNAL MEDICINE

## 2023-04-25 PROCEDURE — 1159F MED LIST DOCD IN RCRD: CPT | Mod: HCNC,CPTII,S$GLB, | Performed by: INTERNAL MEDICINE

## 2023-04-25 PROCEDURE — 3072F LOW RISK FOR RETINOPATHY: CPT | Mod: HCNC,CPTII,S$GLB, | Performed by: INTERNAL MEDICINE

## 2023-04-25 PROCEDURE — 1126F AMNT PAIN NOTED NONE PRSNT: CPT | Mod: HCNC,CPTII,S$GLB, | Performed by: INTERNAL MEDICINE

## 2023-04-25 PROCEDURE — 99999 PR PBB SHADOW E&M-EST. PATIENT-LVL IV: CPT | Mod: PBBFAC,HCNC,, | Performed by: INTERNAL MEDICINE

## 2023-04-25 PROCEDURE — 1159F PR MEDICATION LIST DOCUMENTED IN MEDICAL RECORD: ICD-10-PCS | Mod: HCNC,CPTII,S$GLB, | Performed by: INTERNAL MEDICINE

## 2023-04-25 PROCEDURE — 1160F RVW MEDS BY RX/DR IN RCRD: CPT | Mod: HCNC,CPTII,S$GLB, | Performed by: INTERNAL MEDICINE

## 2023-04-25 PROCEDURE — 99999 PR PBB SHADOW E&M-EST. PATIENT-LVL IV: ICD-10-PCS | Mod: PBBFAC,HCNC,, | Performed by: INTERNAL MEDICINE

## 2023-04-25 PROCEDURE — 3077F SYST BP >= 140 MM HG: CPT | Mod: HCNC,CPTII,S$GLB, | Performed by: INTERNAL MEDICINE

## 2023-04-25 PROCEDURE — 36415 COLL VENOUS BLD VENIPUNCTURE: CPT | Mod: HCNC | Performed by: INTERNAL MEDICINE

## 2023-04-25 PROCEDURE — 3078F PR MOST RECENT DIASTOLIC BLOOD PRESSURE < 80 MM HG: ICD-10-PCS | Mod: HCNC,CPTII,S$GLB, | Performed by: INTERNAL MEDICINE

## 2023-04-25 PROCEDURE — 1126F PR PAIN SEVERITY QUANTIFIED, NO PAIN PRESENT: ICD-10-PCS | Mod: HCNC,CPTII,S$GLB, | Performed by: INTERNAL MEDICINE

## 2023-04-25 PROCEDURE — 3078F DIAST BP <80 MM HG: CPT | Mod: HCNC,CPTII,S$GLB, | Performed by: INTERNAL MEDICINE

## 2023-04-25 PROCEDURE — 3077F PR MOST RECENT SYSTOLIC BLOOD PRESSURE >= 140 MM HG: ICD-10-PCS | Mod: HCNC,CPTII,S$GLB, | Performed by: INTERNAL MEDICINE

## 2023-04-25 PROCEDURE — 3072F PR LOW RISK FOR RETINOPATHY: ICD-10-PCS | Mod: HCNC,CPTII,S$GLB, | Performed by: INTERNAL MEDICINE

## 2023-04-25 PROCEDURE — 85610 PROTHROMBIN TIME: CPT | Mod: HCNC | Performed by: INTERNAL MEDICINE

## 2023-04-25 NOTE — ASSESSMENT & PLAN NOTE
- Patient is s/p CABG in 1990s. He denies angina.Recent angiogram shows occluded LCx - medically managed. Continue with anticoagulant and statin therapy.

## 2023-04-25 NOTE — PROGRESS NOTES
PCP - Devon Langston MD  Subjective:   Patient ID:  Dariel Urbina is a 82 y.o. male who presents for  evaluation of mitral regurgitation.     Referring provider: Brant Devine MD    HPI:  Dariel Urbina is an 83 yo M with CAD s/p CABG (1990s), mechanical AVR (on warfarin), moderate-severe MR, CKD stage 4 who presents for follow up of mitral regurgitation.    Patient's last clinic visit with us was on 2/6/23 - he underwent coronary angiogram procedure due to abnormal stress test and also as part of pre mitral clip evaluation. This showed patent LIMA-LAD graft, occluded LCx, and mild nonobstructive disease in the RCA. No interventions done. He also underwent KIRSTEN which showed EF 60% and moderate MR. Of note, patient required hospitalization since his last visit due to supratherapeutic INR levels and JIM. He was given vitamin K. He had a renal US for work up of JIM that showed nonobstructing indeterminate bladder mass, for which Urology was consulted. They recommended no further work up given stable appearance of mass on prior imaging.     Interval History: Patient presents to clinic today with his wife for follow up. He continues with mild dyspnea on exertion - states he ambulates ~10-12 feet before becoming short of breath. He typically is able to continue walking, but occasionally has to stop to recover. He denies orthopnea, PND, peripheral swelling. No angina, no palpitations. No near syncope or syncope. KIRSTEN from 4/14/23 was reviewed by Elizabeth Shirley and Nahun today - showing only mild MR with mean diastolic gradient 1 mmHg at 72 bpm.        History:     Past Medical History:   Diagnosis Date    Anemia of chronic renal failure, stage 3 (moderate) 05/27/2015    Anticoagulant long-term use     Atherosclerosis of coronary artery bypass graft of native heart without angina pectoris 09/11/2012    3-27-18 [facility] Two vessel coronary artery disease.   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     Bilateral carotid artery disease 02/09/2017    Bleeding from the nose     Bleeding nose 03/21/2018    Cancer     Cataract     CHF (congestive heart failure)     CKD (chronic kidney disease) stage 3, GFR 30-59 ml/min 05/27/2015    Claudication of left lower extremity 09/17/2014    Colon polyp     Encounter for blood transfusion     Gastroesophageal reflux disease without esophagitis 03/19/2018    Gastrointestinal hemorrhage associated with intestinal diverticulosis 04/01/2018    Glaucoma     H/O mechanical aortic valve replacement 09/17/2014    History of gout 09/26/2012    Hyperparathyroidism due to renal insufficiency 07/27/2015    Internal hemorrhoid 04/03/2018    Long term current use of anticoagulant therapy 09/26/2012    Mechanical heart valve present     Metabolic acidosis with normal anion gap and bicarbonate losses 03/20/2018    Mixed hyperlipidemia 09/26/2012    NSTEMI (non-ST elevated myocardial infarction) 03/21/2018    Obesity, diabetes, and hypertension syndrome 02/23/2016    Paroxysmal atrial fibrillation 02/06/2023    PVD (peripheral vascular disease) 09/11/2012    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     Past Surgical History:   Procedure Laterality Date    BACK SURGERY      CARDIAC CATHETERIZATION      CARDIAC VALVE REPLACEMENT      CARDIAC VALVE SURGERY      CARPAL TUNNEL RELEASE Right 05/19/2020    Procedure: RELEASE, CARPAL TUNNEL;  Surgeon: Rupesh Norris Jr., MD;  Location: [facility];  Service: Plastics;  Laterality: Right;    CATARACT EXTRACTION Left 11/13/2022        COLON SURGERY      COLONOSCOPY N/A 03/31/2017    Procedure: COLONOSCOPY;  Surgeon: Bruno Raymond MD;  Location:  [facility] ENDO (4TH FLR);  Service: Endoscopy;  Laterality: N/A;  Patient's wife requesting date.    COLONOSCOPY N/A 04/03/2018    Procedure: COLONOSCOPY;  Surgeon: Bonifacio Pelletier MD;  Location: [facility] ENDO (2ND FLR);  Service: Endoscopy;  Laterality: N/A;    COLONOSCOPY N/A 08/13/2018    Procedure: COLONOSCOPY;  Surgeon: Kam Barba MD;  Location: [facility] ENDO (2ND FLR);  Service: Endoscopy;  Laterality: N/A;  2nd floor: PA pressure 49; hx of moderate-severe valve disease     per Coumadin clinic-Patient can hold 5 days with lovenox bridge       ok to schedule per Katarina    CORONARY ANGIOGRAPHY N/A 10/04/2021    Procedure: Left heart cath +/- peripheral angiogram;  Surgeon: Jose Ruiz MD;  Location: [facility] CATH LAB;  Service: Cardiology;  Laterality: N/A;    CORONARY ANGIOGRAPHY N/A 3/2/2023    Procedure: Angiogram, Coronary;  Surgeon: Devon Garnica MD;  Location: [facility] CATH LAB;  Service: Cardiology;  Laterality: N/A;    CORONARY ANGIOPLASTY      CORONARY ARTERY BYPASS GRAFT      CORONARY BYPASS GRAFT ANGIOGRAPHY  10/04/2021    Procedure: Bypass graft study;  Surgeon: Jose Ruiz MD;  Location: [facility] CATH LAB;  Service: Cardiology;;    CORONARY BYPASS GRAFT ANGIOGRAPHY  3/2/2023    Procedure: Bypass graft study;  Surgeon: Devon Garnica MD;  Location: [facility] CATH LAB;  Service: Cardiology;;    ECHOCARDIOGRAM,TRANSESOPHAGEAL N/A 4/14/2023    Procedure: Transesophageal echo (KIRSTEN) intra-procedure log documentation;  Surgeon: [facility] Diagnostic Provider;  Location: [facility] EP LAB;  Service: Cardiology;  Laterality: N/A;    EYE SURGERY      HERNIA REPAIR      INTRAOCULAR PROSTHESES INSERTION Left 11/13/2022    Procedure: INSERTION, IOL PROSTHESIS;  Surgeon: Alia Mckeon MD;  Location: [facility] OR [address] [place] [address];  Service: Ophthalmology;  Laterality: Left;    INTRAOCULAR PROSTHESES INSERTION Right 12/04/2022    Procedure: INSERTION, IOL PROSTHESIS;  Surgeon: Alia Mckeon MD;  [address];  Service:  Ophthalmology;  Laterality: Right;    PHACOEMULSIFICATION OF CATARACT Left 2022    Procedure: PHACOEMULSIFICATION, CATARACT;  Surgeon: Alia Mckeon MD;  Location: [address];  Service: Ophthalmology;  Laterality: Left;    PHACOEMULSIFICATION OF CATARACT Right 2022    Procedure: PHACOEMULSIFICATION, CATARACT;  Surgeon: Alia Mckeon MD;  Location: [address];  Service: Ophthalmology;  Laterality: Right;    SPINE SURGERY      TRANSESOPHAGEAL ECHOCARDIOGRAPHY N/A 3/22/2023    Procedure: ECHOCARDIOGRAM, TRANSESOPHAGEAL;  Surgeon: [facility] Diagnostic Provider;  Location: [facility] EP LAB;  Service: Anesthesiology;  Laterality: N/A;    TRANSESOPHAGEAL ECHOCARDIOGRAPHY N/A 2023    Procedure: ECHOCARDIOGRAM, TRANSESOPHAGEAL;  Surgeon: [facility] Diagnostic Provider;  Location: [facility] EP LAB;  Service: Anesthesiology;  Laterality: N/A;    VASECTOMY       Social History     Tobacco Use    Smoking status: Former     Packs/day: 1.00     Years: 20.00     Pack years: 20.00     Types: Cigarettes     Quit date: 1980     Years since quittin.6     Passive exposure: Never    Smokeless tobacco: Never   Substance Use Topics    Alcohol use: No     Family History   Problem Relation Age of Onset    Heart failure Mother     Heart disease Mother     Heart failure Father     Heart disease Father     Alcohol abuse Father     Heart failure Brother     Heart disease Brother     Diabetes Brother     No Known Problems Sister     No Known Problems Maternal Grandmother     No Known Problems Maternal Grandfather     No Known Problems Paternal Grandmother     No Known Problems Paternal Grandfather     Heart disease Sister     No Known Problems Maternal Aunt     No Known Problems Maternal Uncle     No Known Problems Paternal Aunt     No Known Problems Paternal Uncle     Amblyopia Neg Hx     Blindness Neg Hx     Cancer Neg Hx     Cataracts Neg Hx     Glaucoma Neg Hx     Hypertension Neg Hx     Macular degeneration Neg Hx      Retinal detachment Neg Hx     Strabismus Neg Hx     Stroke Neg Hx     Thyroid disease Neg Hx     Anemia Neg Hx     Arrhythmia Neg Hx     Asthma Neg Hx     Clotting disorder Neg Hx     Fainting Neg Hx     Heart attack Neg Hx     Hyperlipidemia Neg Hx     Atrial Septal Defect Neg Hx     Melanoma Neg Hx        Meds:     Review of patient's allergies indicates:   Allergen Reactions    Fosinopril      Intolerance- elevates potassium level      Losartan      Intolerance- elevates potassium level       Current Outpatient Medications:     albuterol (VENTOLIN HFA) 90 mcg/actuation inhaler, Inhale 2 puffs into the lungs every 6 (six) hours as needed for Wheezing. Rescue, Disp: 18 g, Rfl: 1    allopurinoL (ZYLOPRIM) 100 MG tablet, TAKE 1 TABLET EVERY DAY, Disp: 90 tablet, Rfl: 3    bumetanide (BUMEX) 1 MG tablet, Take 1 tablet (1 mg total) by mouth every other day., Disp: 15 tablet, Rfl: 11    ferrous gluconate (FERGON) 324 MG tablet, Take 1 tablet (324 mg total) by mouth daily with breakfast., Disp: 90 tablet, Rfl: 1    isosorbide mononitrate (IMDUR) 60 MG 24 hr tablet, Take 60 mg by mouth once daily., Disp: , Rfl:     loperamide (IMODIUM) 2 mg capsule, Take 2 mg by mouth 4 (four) times daily as needed for Diarrhea., Disp: , Rfl:     metoprolol succinate (TOPROL-XL) 25 MG 24 hr tablet, Take 1 tablet (25 mg total) by mouth once daily., Disp: 90 tablet, Rfl: 3    omega-3 fatty acids/fish oil (FISH OIL-OMEGA-3 FATTY ACIDS) 300-1,000 mg capsule, Take 1 capsule by mouth once daily., Disp: , Rfl:     oxymetazoline (AFRIN) 0.05 % nasal spray, Use 2 sprays by Nasal route daily as needed (nose bleed)., Disp: 30 mL, Rfl: 0    rosuvastatin (CRESTOR) 40 MG Tab, TAKE 1 TABLET EVERY EVENING., Disp: 90 tablet, Rfl: 3    warfarin (COUMADIN) 5 MG tablet, warfarin 7.5 (1.5 tablets) sun and Thursday, and 5mg other days. warfarin 7.5 (1.5 tablets) sun and Thursday, and 5mg other days, Disp: 30 tablet, Rfl: 11  No current facility-administered  medications for this visit.    Facility-Administered Medications Ordered in Other Visits:     0.9%  NaCl infusion, , Intravenous, Continuous, Devon Garnica MD, Last Rate: 125 mL/hr at 03/02/23 0840, New Bag at 03/02/23 0840    ondansetron injection 4 mg, 4 mg, Intravenous, Daily PRN, Tara Hernandez MD    ondansetron injection 4 mg, 4 mg, Intravenous, Daily PRN, Tara Hernandez MD    phenylephrine HCL 2.5% ophthalmic solution 1 drop, 1 drop, Left Eye, On Call Procedure, Alia Mckeon MD, 1 drop at 11/13/22 0630    phenylephrine HCL 2.5% ophthalmic solution 1 drop, 1 drop, Right Eye, On Call Procedure, Alia Mckeon MD, 1 drop at 12/04/22 0630    proparacaine 0.5 % ophthalmic solution 1 drop, 1 drop, Left Eye, On Call Procedure, Alia Mckeon MD, 1 drop at 11/13/22 0620    proparacaine 0.5 % ophthalmic solution 1 drop, 1 drop, Right Eye, On Call Procedure, Alia Mckeon MD, 1 drop at 12/04/22 0620    tropicamide 1% ophthalmic solution 1 drop, 1 drop, Left Eye, On Call Procedure, Alia Mckeon MD, 1 drop at 11/13/22 0630    tropicamide 1% ophthalmic solution 1 drop, 1 drop, Right Eye, On Call Procedure, Alia Mckeon MD, 1 drop at 12/04/22 0635      Review of Systems   Constitutional:  Negative for chills, diaphoresis, fever, malaise/fatigue and weight loss.   HENT: Negative.     Eyes:  Negative for blurred vision, double vision, pain and redness.   Respiratory:  Positive for shortness of breath. Negative for cough and wheezing.    Cardiovascular:  Negative for chest pain, palpitations, orthopnea, claudication, leg swelling and PND.   Gastrointestinal:  Negative for abdominal pain, constipation, diarrhea, nausea and vomiting.   Genitourinary: Negative.    Musculoskeletal: Negative.    Skin: Negative.    Neurological:  Negative for dizziness, focal weakness, seizures, loss of consciousness, weakness and headaches.   Endo/Heme/Allergies: Negative.    Psychiatric/Behavioral:  Negative  "for depression. The patient is not nervous/anxious.      Objective:   BP (!) 168/70 (BP Location: Right arm, Patient Position: Sitting, BP Method: Large (Automatic))   Pulse 65   Ht 5' 4.5" (1.638 m)   Wt 77.7 kg (171 lb 4.8 oz)   SpO2 99%   BMI 28.95 kg/m²   Physical Exam  Constitutional:       General: He is not in acute distress.  HENT:      Head: Normocephalic and atraumatic.   Eyes:      Conjunctiva/sclera: Conjunctivae normal.      Pupils: Pupils are equal, round, and reactive to light.   Neck:      Vascular: No JVD.   Cardiovascular:      Rate and Rhythm: Normal rate and regular rhythm.      Comments: Mechanical click over RSB  Pulmonary:      Effort: Pulmonary effort is normal. No respiratory distress.      Breath sounds: Normal breath sounds. No wheezing or rales.   Chest:      Chest wall: No tenderness.   Abdominal:      General: Bowel sounds are normal. There is no distension.      Palpations: Abdomen is soft.      Tenderness: There is no abdominal tenderness.   Musculoskeletal:         General: Normal range of motion.      Cervical back: Normal range of motion and neck supple.   Skin:     General: Skin is warm and dry.      Findings: No erythema.   Neurological:      Mental Status: He is alert and oriented to person, place, and time.   Psychiatric:         Mood and Affect: Mood and affect normal.         Cognition and Memory: Memory normal.         Judgment: Judgment normal.       Labs:     Lab Results   Component Value Date     04/18/2023    K 5.1 04/18/2023     04/18/2023    CO2 19 (L) 04/18/2023    BUN 59 (H) 04/18/2023    CREATININE 2.3 (H) 04/18/2023    ANIONGAP 10 04/18/2023     Lab Results   Component Value Date    HGBA1C 5.2 02/01/2023     Lab Results   Component Value Date     (H) 04/12/2023    BNP 1,188 (H) 12/29/2022     (H) 12/21/2022       Lab Results   Component Value Date    WBC 4.69 04/15/2023    HGB 9.3 (L) 04/15/2023    HCT 28.9 (L) 04/15/2023     " 04/15/2023    GRAN 2.7 04/15/2023    GRAN 57.9 04/15/2023     Lab Results   Component Value Date    CHOL 115 (L) 02/28/2023    HDL 37 (L) 02/28/2023    LDLCALC 47.0 (L) 02/28/2023    TRIG 155 (H) 02/28/2023       Lab Results   Component Value Date     04/18/2023    K 5.1 04/18/2023     04/18/2023    CO2 19 (L) 04/18/2023    BUN 59 (H) 04/18/2023    CREATININE 2.3 (H) 04/18/2023    ANIONGAP 10 04/18/2023     Lab Results   Component Value Date    HGBA1C 5.2 02/01/2023     Lab Results   Component Value Date     (H) 04/12/2023    BNP 1,188 (H) 12/29/2022     (H) 12/21/2022    Lab Results   Component Value Date    WBC 4.69 04/15/2023    HGB 9.3 (L) 04/15/2023    HCT 28.9 (L) 04/15/2023     04/15/2023    GRAN 2.7 04/15/2023    GRAN 57.9 04/15/2023     Lab Results   Component Value Date    CHOL 115 (L) 02/28/2023    HDL 37 (L) 02/28/2023    LDLCALC 47.0 (L) 02/28/2023    TRIG 155 (H) 02/28/2023                Cardiovascular Imaging:     Echo:   EF   Date Value Ref Range Status   04/14/2023 60 % Final   12/30/2022 48 % Final   10/05/2022 45 % Final     Nuc Stress EF   Date Value Ref Range Status   01/17/2023 46 % Final     Nuc Rest EF   Date Value Ref Range Status   01/17/2023 46  Final         Assessment & Plan:     Mitral insufficiency  - Mild MR on recent KIRSTEN (reviewed images with Elizabeth Shirley and Nahun). Continue GDMT. Follow up with Dr Garnica PRTITO.    Atherosclerosis of native coronary artery of native heart without angina pectoris  - Patient is s/p CABG in 1990s. He denies angina.Recent angiogram shows occluded LCx - medically managed. Continue with anticoagulant and statin therapy.     Chronic kidney disease (CKD), stage IV (severe)  - Baselince Cr ~2.3-2.7. Avoid nephrotoxins. Renally dose medicines.     H/O mechanical aortic valve replacement  - Current on warfarin - managed by coumadin clinic.     Hyperlipidemia associated with type 2 diabetes mellitus  - Continue crestor 40 mg  daily.     Hypertension associated with diabetes  - Continue current antihypertensives. Adjustments per primary care/primary cardiologist.      Discussed with Dr Garnica    Signed:  Kasia Mclean M.D.  Interventional Cardiology Fellow PGY-8  [facility]           I have seen the patient, reviewed the Fellow's history and physical, assessment and plan. I have personally interviewed and examined the patient and agree with the findings.  Reviewed KIRSTEN with systolic blood pressure equals 130 which demonstrates mild mitral regurgitation now with patient on optimal medical therapy.  I recommend to continue medical therapy, follow-up with  In [facility] and I will be happy to see the patient p.r.n. worsening mitral regurgitation.    RENETTA Garnica MD

## 2023-05-02 ENCOUNTER — LAB VISIT (OUTPATIENT)
Dept: LAB | Facility: HOSPITAL | Age: 83
End: 2023-05-02
Payer: MEDICARE

## 2023-05-02 DIAGNOSIS — Z95.2 H/O MECHANICAL AORTIC VALVE REPLACEMENT: ICD-10-CM

## 2023-05-02 DIAGNOSIS — Z79.01 LONG TERM CURRENT USE OF ANTICOAGULANT THERAPY: ICD-10-CM

## 2023-05-02 LAB
INR PPP: 1.8 (ref 0.8–1.2)
PROTHROMBIN TIME: 18.8 SEC (ref 9–12.5)

## 2023-05-02 PROCEDURE — 85610 PROTHROMBIN TIME: CPT | Mod: HCNC | Performed by: INTERNAL MEDICINE

## 2023-05-02 PROCEDURE — 36415 COLL VENOUS BLD VENIPUNCTURE: CPT | Mod: HCNC | Performed by: INTERNAL MEDICINE

## 2023-05-03 ENCOUNTER — ANTI-COAG VISIT (OUTPATIENT)
Dept: CARDIOLOGY | Facility: CLINIC | Age: 83
End: 2023-05-03
Payer: MEDICARE

## 2023-05-03 DIAGNOSIS — Z95.2 H/O MECHANICAL AORTIC VALVE REPLACEMENT: ICD-10-CM

## 2023-05-03 DIAGNOSIS — Z79.01 ANTICOAGULANT LONG-TERM USE: Primary | ICD-10-CM

## 2023-05-03 PROCEDURE — 93793 ANTICOAG MGMT PT WARFARIN: CPT | Mod: S$GLB,,,

## 2023-05-03 PROCEDURE — 93793 PR ANTICOAGULANT MGMT FOR PT TAKING WARFARIN: ICD-10-PCS | Mod: S$GLB,,,

## 2023-05-03 NOTE — PROGRESS NOTES
INR not at goal. Medications, chart, and patient findings reviewed. See calendar for adjustments to dose and follow up plan.  Pt remains subtherapeutic after boosting dose.  Recommend to increase maintenance dose & re-assess in 1 week.

## 2023-05-04 ENCOUNTER — TELEPHONE (OUTPATIENT)
Dept: ENDOSCOPY | Facility: HOSPITAL | Age: 83
End: 2023-05-04

## 2023-05-04 ENCOUNTER — CLINICAL SUPPORT (OUTPATIENT)
Dept: ENDOSCOPY | Facility: HOSPITAL | Age: 83
End: 2023-05-04
Attending: INTERNAL MEDICINE
Payer: MEDICARE

## 2023-05-04 VITALS — WEIGHT: 172 LBS | HEIGHT: 65 IN | BODY MASS INDEX: 28.66 KG/M2

## 2023-05-04 DIAGNOSIS — R55 SYNCOPE, UNSPECIFIED SYNCOPE TYPE: ICD-10-CM

## 2023-05-04 DIAGNOSIS — D50.8 OTHER IRON DEFICIENCY ANEMIA: ICD-10-CM

## 2023-05-04 DIAGNOSIS — R19.7 DIARRHEA, UNSPECIFIED TYPE: ICD-10-CM

## 2023-05-04 NOTE — PLAN OF CARE
Contacted patient for 9:00 am PAT appointment. Spoke with patient's wife. Patient is currently taking coumadin. Hold approval requested from the coumadin clinic. Cardiac clearance also requested from Dr. Paulino. Will call patient back once coumadin hold approval and cardiac clearance received.

## 2023-05-04 NOTE — TELEPHONE ENCOUNTER
Good morning,    Patient has a referral for an EGD for diarrhea and iron deficiency anemia.  Is patient cleared from a cardiac standpoint to proceed to with procedure?  Please advise.    Thanks,    Caren

## 2023-05-04 NOTE — TELEPHONE ENCOUNTER
Patient has a referral for an EGD and is taking Coumadin. Per endoscopy protocol it needs to be held for 5 days prior to the procedure.   Ok to hold?   Please advise.   Scheduling pending hold approval.    Thanks,  Caren

## 2023-05-06 ENCOUNTER — HOSPITAL ENCOUNTER (OUTPATIENT)
Dept: RADIOLOGY | Facility: HOSPITAL | Age: 83
Discharge: HOME | End: 2023-05-06
Attending: STUDENT IN AN ORGANIZED HEALTH CARE EDUCATION/TRAINING PROGRAM
Payer: MEDICARE

## 2023-05-06 DIAGNOSIS — N32.89 BLADDER MASS: ICD-10-CM

## 2023-05-06 PROCEDURE — 74176 CT ABD & PELVIS W/O CONTRAST: CPT | Mod: 26,,, | Performed by: INTERNAL MEDICINE

## 2023-05-06 PROCEDURE — 74176 CT ABDOMEN PELVIS WITHOUT CONTRAST: ICD-10-PCS | Mod: 26,,, | Performed by: INTERNAL MEDICINE

## 2023-05-06 PROCEDURE — 74176 CT ABD & PELVIS W/O CONTRAST: CPT | Mod: TC

## 2023-05-07 NOTE — PROGRESS NOTES
[facility]  Urologic Oncology Clinic Note        Date of Service: 5/8/2023        Chief Complaint/Reason for Consultation: Bladder Mass    Requesting Provider:   No referring provider defined for this encounter.      History of Present Illness:   Patient 82 y.o. male presents with abnormal ultrasound finding of possible solid mass anterior to bladder. Plan was to complete CT scan to better evaluate anterior bladder mass. He returns today to discuss results.     No hematuria. Former smoker quit in the 80s.     Here today with his wife. No voiding complaints.     Urologic History:     4/13/2023 -- US retro complete -- possible solid mass anterior to bladder, medical renal disease  5/6/2023 -- CT AP w/o contrast -- no obvious bladder mass or mass anterior to bladder    Patient Active Problem List    Diagnosis Date Noted    Mitral insufficiency 03/22/2023    Paroxysmal atrial fibrillation 02/06/2023    Chronic combined systolic and diastolic heart failure 12/31/2022    Acute decompensated heart failure 12/30/2022    Coronary artery disease involving native coronary artery of native heart without angina pectoris 12/29/2022    Epistaxis 12/21/2022    Supratherapeutic INR 12/21/2022    Syncope 09/29/2022    Chronic kidney disease (CKD), stage IV (severe) 02/10/2022    Weakness 02/03/2022    Anemia 02/03/2022    Atherosclerosis of native coronary artery of native heart without angina pectoris 09/29/2021    Severe carpal tunnel syndrome of right wrist 05/19/2020    Bilateral carpal tunnel syndrome 04/30/2020    Numbness and tingling in both hands 04/30/2020    Upper extremity weakness 09/25/2019    Hypertension associated with diabetes 09/25/2019    Hx of colonic polyp 07/28/2018    Gastrointestinal hemorrhage associated with intestinal diverticulosis 04/01/2018    Acute anterior epistaxis 03/21/2018    Metabolic acidosis with normal anion gap and bicarbonate losses 03/20/2018    Acute renal failure superimposed  on stage 3 chronic kidney disease 03/19/2018    Pulmonary heart disease 02/20/2018    Diarrhea 03/31/2017    Bilateral carotid artery disease 02/09/2017    Stage 3b chronic kidney disease 05/27/2015    Type 2 diabetes mellitus with diabetic peripheral angiopathy without gangrene 05/27/2015    H/O mechanical aortic valve replacement 09/17/2014    Atherosclerosis of native artery of extremity with intermittent claudication 09/17/2014    Anticoagulant long-term use 09/26/2012    Hyperlipidemia associated with type 2 diabetes mellitus 09/26/2012    History of colon resection 09/26/2012    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    History of gout 09/26/2012    Atherosclerosis of lower extremity with claudication 09/11/2012          Review of patient's allergies indicates:   Allergen Reactions    Fosinopril      Intolerance- elevates potassium level      Losartan      Intolerance- elevates potassium level        Past Medical History:   Diagnosis Date    Anemia of chronic renal failure, stage 3 (moderate) 05/27/2015    Anticoagulant long-term use     Atherosclerosis of coronary artery bypass graft of native heart without angina pectoris 09/11/2012    3-27-18 [facility] Two vessel coronary artery disease.   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    Bilateral carotid artery disease 02/09/2017    Bleeding from the nose     Bleeding nose 03/21/2018    Cancer     Cataract     CHF (congestive heart failure)     CKD (chronic kidney disease) stage 3, GFR 30-59 ml/min 05/27/2015    Claudication of left lower extremity 09/17/2014    Colon polyp     Encounter for blood transfusion     Gastroesophageal reflux disease without esophagitis 03/19/2018    Gastrointestinal hemorrhage associated with intestinal diverticulosis 04/01/2018    Glaucoma     H/O mechanical aortic valve replacement 09/17/2014    History of gout 09/26/2012    Hyperparathyroidism due to renal insufficiency 07/27/2015    Internal hemorrhoid 04/03/2018     Long term current use of anticoagulant therapy 09/26/2012    Mechanical heart valve present     Metabolic acidosis with normal anion gap and bicarbonate losses 03/20/2018    Mixed hyperlipidemia 09/26/2012    NSTEMI (non-ST elevated myocardial infarction) 03/21/2018    Obesity, diabetes, and hypertension syndrome 02/23/2016    Paroxysmal atrial fibrillation 02/06/2023    PVD (peripheral vascular disease) 09/11/2012    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      Past Surgical History:   Procedure Laterality Date    BACK SURGERY      CARDIAC CATHETERIZATION      CARDIAC VALVE REPLACEMENT      CARDIAC VALVE SURGERY      CARPAL TUNNEL RELEASE Right 05/19/2020    Procedure: RELEASE, CARPAL TUNNEL;  Surgeon: Rupesh Norris Jr., MD;  Location: [facility];  Service: Plastics;  Laterality: Right;    CATARACT EXTRACTION Left 11/13/2022        COLON SURGERY      COLONOSCOPY N/A 03/31/2017    Procedure: COLONOSCOPY;  Surgeon: Bruno Raymond MD;  Location: [facility] (4TH FLR);  Service: Endoscopy;  Laterality: N/A;  Patient's wife requesting date.    COLONOSCOPY N/A 04/03/2018    Procedure: COLONOSCOPY;  Surgeon: Bonifacio Pelletier MD;  Location: [facility] ENDO (2ND FLR);  Service: Endoscopy;  Laterality: N/A;    COLONOSCOPY N/A 08/13/2018    Procedure: COLONOSCOPY;  Surgeon: Kam Barba MD;  Location: [facility] ENDO (2ND FLR);  Service: Endoscopy;  Laterality: N/A;  2nd floor: PA pressure 49; hx of moderate-severe valve disease     per Coumadin clinic-Patient can hold 5 days with lovenox bridge       ok to schedule per Katarina    CORONARY ANGIOGRAPHY N/A 10/04/2021    Procedure: Left heart cath +/- peripheral angiogram;   Surgeon: Jose Ruiz MD;  Location: [facility] CATH LAB;  Service: Cardiology;  Laterality: N/A;    CORONARY ANGIOGRAPHY N/A 3/2/2023    Procedure: Angiogram, Coronary;  Surgeon: Devon Garnica MD;  Location: [facility] CATH LAB;  Service: Cardiology;  Laterality: N/A;    CORONARY ANGIOPLASTY      CORONARY ARTERY BYPASS GRAFT      CORONARY BYPASS GRAFT ANGIOGRAPHY  10/04/2021    Procedure: Bypass graft study;  Surgeon: Jose Ruiz MD;  Location: [facility] CATH LAB;  Service: Cardiology;;    CORONARY BYPASS GRAFT ANGIOGRAPHY  3/2/2023    Procedure: Bypass graft study;  Surgeon: Devon Garnica MD;  Location: [facility] CATH LAB;  Service: Cardiology;;    ECHOCARDIOGRAM,TRANSESOPHAGEAL N/A 4/14/2023    Procedure: Transesophageal echo (KIRSTEN) intra-procedure log documentation;  Surgeon: Zenia Diagnostic Provider;  Location: [facility] EP LAB;  Service: Cardiology;  Laterality: N/A;    EYE SURGERY      HERNIA REPAIR      INTRAOCULAR PROSTHESES INSERTION Left 11/13/2022    Procedure: INSERTION, IOL PROSTHESIS;  Surgeon: Alia Mckeon MD;  Location: [address];  Service: Ophthalmology;  Laterality: Left;    INTRAOCULAR PROSTHESES INSERTION Right 12/04/2022    Procedure: INSERTION, IOL PROSTHESIS;  Surgeon: Alia Mckeon MD;  Location: [address];  Service: Ophthalmology;  Laterality: Right;    PHACOEMULSIFICATION OF CATARACT Left 11/13/2022    Procedure: PHACOEMULSIFICATION, CATARACT;  Surgeon: Alia Mckeon MD;  Location: [address];  Service: Ophthalmology;  Laterality: Left;    PHACOEMULSIFICATION OF CATARACT Right 12/04/2022    Procedure: PHACOEMULSIFICATION, CATARACT;  Surgeon: Alia Mckeon MD;  Location: [facility] OR [address] [place] [address];  Service: Ophthalmology;  Laterality: Right;    SPINE SURGERY      TRANSESOPHAGEAL ECHOCARDIOGRAPHY N/A 3/22/2023    Procedure: ECHOCARDIOGRAM, TRANSESOPHAGEAL;  Surgeon: Zenia Diagnostic Provider;  Location: [facility] EP LAB;  Service: Anesthesiology;  Laterality: N/A;    TRANSESOPHAGEAL  ECHOCARDIOGRAPHY N/A 2023    Procedure: ECHOCARDIOGRAM, TRANSESOPHAGEAL;  Surgeon: Zenia Diagnostic Provider;  Location: [facility] EP LAB;  Service: Anesthesiology;  Laterality: N/A;    VASECTOMY        Family History   Problem Relation Age of Onset    Heart failure Mother     Heart disease Mother     Heart failure Father     Heart disease Father     Alcohol abuse Father     Heart failure Brother     Heart disease Brother     Diabetes Brother     No Known Problems Sister     No Known Problems Maternal Grandmother     No Known Problems Maternal Grandfather     No Known Problems Paternal Grandmother     No Known Problems Paternal Grandfather     Heart disease Sister     No Known Problems Maternal Aunt     No Known Problems Maternal Uncle     No Known Problems Paternal Aunt     No Known Problems Paternal Uncle     Amblyopia Neg Hx     Blindness Neg Hx     Cancer Neg Hx     Cataracts Neg Hx     Glaucoma Neg Hx     Hypertension Neg Hx     Macular degeneration Neg Hx     Retinal detachment Neg Hx     Strabismus Neg Hx     Stroke Neg Hx     Thyroid disease Neg Hx     Anemia Neg Hx     Arrhythmia Neg Hx     Asthma Neg Hx     Clotting disorder Neg Hx     Fainting Neg Hx     Heart attack Neg Hx     Hyperlipidemia Neg Hx     Atrial Septal Defect Neg Hx     Melanoma Neg Hx       Social History     Tobacco Use    Smoking status: Former     Packs/day: 1.00     Years: 20.00     Pack years: 20.00     Types: Cigarettes     Quit date: 1980     Years since quittin.6     Passive exposure: Never    Smokeless tobacco: Never   Substance Use Topics    Alcohol use: No        Review of Systems   Constitutional:  Negative for activity change, fatigue and fever.   HENT:  Negative for congestion.    Respiratory:  Negative for cough.    Cardiovascular:  Negative for chest pain.   Gastrointestinal:  Negative for abdominal pain.   Genitourinary:  Negative for flank pain and hematuria.           OBJECTIVE:     Vitals:    23 0759  "  BP: (!) 136/59   Pulse: 61   Weight: 77.4 kg (170 lb 11.2 oz)   Height: 5' 4.5" (1.638 m)            General Appearance: Alert, cooperative, no distress  Head: Normocephalic  Eyes: Clear conjunctiva  Neck: No obvious LND or JVD  Lungs: Normal chest excursion, no accessory muscle use  Chest: Regular rate rhythm by palpation, no pedal edema  Abdomen: Soft, non-tender, non-distended  Extremities: Atraumatic   Lymph Nodes: No appreciable lymph adenopathy  Neurologic: No gross gait, motor or sensory deficits            LAB:      BMP  Lab Results   Component Value Date     2023    K 5.1 2023     2023    CO2 19 (L) 2023    BUN 59 (H) 2023    CREATININE 2.3 (H) 2023    CALCIUM 10.7 (H) 2023    ANIONGAP 10 2023    EGFRNORACEVR 27.8 (A) 2023           IMAGIN/6/2023  CT ABDOMEN PELVIS WITHOUT CONTRAST     CLINICAL HISTORY:  bladder mass; Other specified disorders of bladder     TECHNIQUE:  Axial images of the abdomen and pelvis were acquired without IV contrast. No oral contrast was administered.  Coronal and sagittal reconstructions were also obtained.     COMPARISON:  Retroperitoneal ultrasound 2023; CT abdomen pelvis 2022 and 2010     FINDINGS:  There is decreased sensitivity for detecting solid organ abnormalities without the use of intravenous contrast.     Heart: Normal in size. No pericardial effusion. Partially visualized coronary artery atherosclerosis.     Lungs: Interval increase in patchy ground-glass opacities of the bilateral lung bases, mainly appreciated within the right middle and lower lobes.  New 0.5 cm ground-glass nodule in the right lower lobe (series 2, image 19).  Increased atelectasis and linear bandlike opacities the posterior right lower lobe.  Trace right pleural effusion.     Liver: Normal in size and contour.  Stable appearance of 1.1 cm hypoattenuating lesion of the right hepatic lobe, dating back to " CTA abdomen 2010 likely benign.  No new hepatic lesions.     Gallbladder: Layering hyperdense material within the gallbladder, favored to represent sludge versus microlithiasis.  No pericholecystic fluid or gallbladder wall thickening.     Bile Ducts: No evidence of intrahepatic or extrahepatic biliary ductal dilatation.     Pancreas: No mass or peripancreatic fat stranding.     Spleen: Normal in size and attenuation.     Adrenals: Unremarkable.     Kidneys/Ureters: Renal cortical atrophy bilaterally with mild perinephric stranding.  Punctate nonobstructing left renal nephrolith.  No hydronephrosis or ureteral dilatation.     Bladder: No evidence of wall thickening.  The bladder is only partially distended.  Along the anterior aspect of the bladder is a more linear configuration on the sagittal plane, felt to relate to incomplete distension.  No large mass to correlate with the finding on the prior ultrasound.     Reproductive organs: Prostate is unremarkable.     Peritoneum: No free air or free fluid.     Retroperitoneum: No pathologically enlarged abdominopelvic lymph nodes.     Bowel/Mesentery: Stomach is unremarkable.  Postoperative changes of partial distal colon resection.    Small bowel is normal in caliber with no evidence of obstruction or inflammation. Colon demonstrates no focal wall thickening or obstruction. Scattered colonic diverticula without surrounding inflammatory changes.   Appendix is visualized and unremarkable.     Abdominal wall:  Supraumbilical fat containing hernia with presence of surgical clips.     Vasculature: The aorta appears stable in caliber when compared to prior.  The infrarenal aorta measures 3.7 cm with mural thrombus (series 2, image 61).  Stable central calcification of the mid abdominal aorta which may represent short segment dissection.  Severe atherosclerosis of the abdominal aorta extending into its major branches, similar to prior.  3.9 cm lower thoracic aorta.     Bones:  Advanced degenerative changes of the visualized osseous structures.  No acute fracture or suspicious osseous lesions.  Remote right lower posterior rib cage fracture incompletely imaged.     Impression:     No large mass anterior to the bladder to correlate with the finding on ultrasound.     New 0.5 cm ground-glass nodule within the right lower lobe For a ground glass nodule <6 mm, Fleischner Society 2017 guidelines recommend no routine follow up.     Interval increase in patchy ground-glass opacifications of the lung bases, which may represent edema, infectious/inflammatory process.     Stable severe calcific atherosclerosis of the abdominal aorta and its distal branches.     Additional findings as above.     Electronically signed by resident: Maria Victoria Patino  Date:                                            05/08/2023  Time:                                           07:31     Electronically signed by: Priscilla Teran MD  Date:                                            05/08/2023  Time:                                           09:41    4/13/2023  US RETROPERITONEAL COMPLETE     CLINICAL HISTORY:  elevated cr;     TECHNIQUE:  Ultrasound of the kidneys and urinary bladder was performed including color flow and Doppler evaluation of the kidneys.     COMPARISON:  CT abdomen pelvis 09/29/2022.  Retroperitoneal ultrasound 02/03/2022.     FINDINGS:  Right kidney: The right kidney measures 10.2 cm.  Cortical thinning with loss of corticomedullary distinction.  Resistive index measures 1.0.  1.2 cm simple cyst at the interpolar region.  No solid renal mass.  No renal stone. No hydronephrosis.     Left kidney: The left kidney measures 10.5 cm.  Cortical thinning with loss of corticomedullary distinction.  Resistive index measures 1.0.  No mass. 3 mm nonobstructing stone within the superior pole.  No hydronephrosis.     The bladder is decompressed around a Ku catheter, limiting evaluation.  Rounded solid focus anterior to  the bladder, measures 4.4 x 3.7 x 3.0 cm, without significant internal Doppler vascularity.     Splenic resistive index measures 0.76.     Impression:     Chronic medical renal disease.  No hydronephrosis.     3 mm left nonobstructing nephrolith.     Nonspecific 4.4 cm rounded solid focus anterior to the bladder, incompletely evaluated.  Recommend further evaluation with contrast-enhanced CT.     This report was flagged in Epic as abnormal.     Electronically signed by resident: Jason Chapman  Date:                                            04/14/2023  Time:                                           08:07     Electronically signed by: Kyle Cameron  Date:                                            04/14/2023  Time:                                           08:21        ASSESSMENT/PLAN:     83 yo M w abnormal finding of possible mass anterior to bladder wall    Plan:    Today I discussed with the patient and his wife the most recent ultrasound findings showing possible mass anterior to his bladder. I informed them that radiology recommended CT scan. Plan for CT w/o contrast to evaluate possible solid mass.     I did inform patient that I thought mass was not likely in his bladder but anterior to the bladder. We also reviewed his urine analysis which did not show blood in his urine lowering the risk of possible bladder cancer.    Today he returns with CT w/o contrast that does not appear to show any bladder mass or anterior bladder mass, but from my read it appears without abnormality. I informed patient that he does not need further intervention and can return PRN.        The total time for the established patient visit was at least 20 minutes in both face-to-face and non-face-to-face activities, which included chart review, interpretation of results, counseling, education, ordering meds/tests/procedures, and/or coordination of care.         Francisco Nunez MD  Urologic Oncology  P: [phone]

## 2023-05-08 ENCOUNTER — OFFICE VISIT (OUTPATIENT)
Dept: UROLOGY | Facility: CLINIC | Age: 83
End: 2023-05-08
Payer: MEDICARE

## 2023-05-08 VITALS
WEIGHT: 170.69 LBS | BODY MASS INDEX: 28.44 KG/M2 | HEART RATE: 61 BPM | HEIGHT: 65 IN | SYSTOLIC BLOOD PRESSURE: 136 MMHG | DIASTOLIC BLOOD PRESSURE: 59 MMHG

## 2023-05-08 DIAGNOSIS — Z01.818 PRE-OP EVALUATION: ICD-10-CM

## 2023-05-08 DIAGNOSIS — R93.89 ABNORMAL ULTRASOUND: Primary | ICD-10-CM

## 2023-05-08 DIAGNOSIS — I15.2 HYPERTENSION ASSOCIATED WITH DIABETES: Primary | ICD-10-CM

## 2023-05-08 DIAGNOSIS — E11.59 HYPERTENSION ASSOCIATED WITH DIABETES: Primary | ICD-10-CM

## 2023-05-08 PROCEDURE — 99213 OFFICE O/P EST LOW 20 MIN: CPT | Mod: S$GLB,,, | Performed by: STUDENT IN AN ORGANIZED HEALTH CARE EDUCATION/TRAINING PROGRAM

## 2023-05-08 PROCEDURE — 3075F SYST BP GE 130 - 139MM HG: CPT | Mod: CPTII,S$GLB,, | Performed by: STUDENT IN AN ORGANIZED HEALTH CARE EDUCATION/TRAINING PROGRAM

## 2023-05-08 PROCEDURE — 99999 PR PBB SHADOW E&M-EST. PATIENT-LVL III: CPT | Mod: PBBFAC,,, | Performed by: STUDENT IN AN ORGANIZED HEALTH CARE EDUCATION/TRAINING PROGRAM

## 2023-05-08 PROCEDURE — 3075F PR MOST RECENT SYSTOLIC BLOOD PRESS GE 130-139MM HG: ICD-10-PCS | Mod: CPTII,S$GLB,, | Performed by: STUDENT IN AN ORGANIZED HEALTH CARE EDUCATION/TRAINING PROGRAM

## 2023-05-08 PROCEDURE — 1159F PR MEDICATION LIST DOCUMENTED IN MEDICAL RECORD: ICD-10-PCS | Mod: CPTII,S$GLB,, | Performed by: STUDENT IN AN ORGANIZED HEALTH CARE EDUCATION/TRAINING PROGRAM

## 2023-05-08 PROCEDURE — 1126F PR PAIN SEVERITY QUANTIFIED, NO PAIN PRESENT: ICD-10-PCS | Mod: CPTII,S$GLB,, | Performed by: STUDENT IN AN ORGANIZED HEALTH CARE EDUCATION/TRAINING PROGRAM

## 2023-05-08 PROCEDURE — 1101F PR PT FALLS ASSESS DOC 0-1 FALLS W/OUT INJ PAST YR: ICD-10-PCS | Mod: CPTII,S$GLB,, | Performed by: STUDENT IN AN ORGANIZED HEALTH CARE EDUCATION/TRAINING PROGRAM

## 2023-05-08 PROCEDURE — 3072F LOW RISK FOR RETINOPATHY: CPT | Mod: CPTII,S$GLB,, | Performed by: STUDENT IN AN ORGANIZED HEALTH CARE EDUCATION/TRAINING PROGRAM

## 2023-05-08 PROCEDURE — 3072F PR LOW RISK FOR RETINOPATHY: ICD-10-PCS | Mod: CPTII,S$GLB,, | Performed by: STUDENT IN AN ORGANIZED HEALTH CARE EDUCATION/TRAINING PROGRAM

## 2023-05-08 PROCEDURE — 99999 PR PBB SHADOW E&M-EST. PATIENT-LVL III: ICD-10-PCS | Mod: PBBFAC,,, | Performed by: STUDENT IN AN ORGANIZED HEALTH CARE EDUCATION/TRAINING PROGRAM

## 2023-05-08 PROCEDURE — 99213 PR OFFICE/OUTPT VISIT, EST, LEVL III, 20-29 MIN: ICD-10-PCS | Mod: S$GLB,,, | Performed by: STUDENT IN AN ORGANIZED HEALTH CARE EDUCATION/TRAINING PROGRAM

## 2023-05-08 PROCEDURE — 3078F DIAST BP <80 MM HG: CPT | Mod: CPTII,S$GLB,, | Performed by: STUDENT IN AN ORGANIZED HEALTH CARE EDUCATION/TRAINING PROGRAM

## 2023-05-08 PROCEDURE — 1126F AMNT PAIN NOTED NONE PRSNT: CPT | Mod: CPTII,S$GLB,, | Performed by: STUDENT IN AN ORGANIZED HEALTH CARE EDUCATION/TRAINING PROGRAM

## 2023-05-08 PROCEDURE — 1159F MED LIST DOCD IN RCRD: CPT | Mod: CPTII,S$GLB,, | Performed by: STUDENT IN AN ORGANIZED HEALTH CARE EDUCATION/TRAINING PROGRAM

## 2023-05-08 PROCEDURE — 3078F PR MOST RECENT DIASTOLIC BLOOD PRESSURE < 80 MM HG: ICD-10-PCS | Mod: CPTII,S$GLB,, | Performed by: STUDENT IN AN ORGANIZED HEALTH CARE EDUCATION/TRAINING PROGRAM

## 2023-05-08 PROCEDURE — 3288F FALL RISK ASSESSMENT DOCD: CPT | Mod: CPTII,S$GLB,, | Performed by: STUDENT IN AN ORGANIZED HEALTH CARE EDUCATION/TRAINING PROGRAM

## 2023-05-08 PROCEDURE — 3288F PR FALLS RISK ASSESSMENT DOCUMENTED: ICD-10-PCS | Mod: CPTII,S$GLB,, | Performed by: STUDENT IN AN ORGANIZED HEALTH CARE EDUCATION/TRAINING PROGRAM

## 2023-05-08 PROCEDURE — 1101F PT FALLS ASSESS-DOCD LE1/YR: CPT | Mod: CPTII,S$GLB,, | Performed by: STUDENT IN AN ORGANIZED HEALTH CARE EDUCATION/TRAINING PROGRAM

## 2023-05-09 ENCOUNTER — OFFICE VISIT (OUTPATIENT)
Dept: OTOLARYNGOLOGY | Facility: CLINIC | Age: 83
End: 2023-05-09
Payer: MEDICARE

## 2023-05-09 ENCOUNTER — ANTI-COAG VISIT (OUTPATIENT)
Dept: CARDIOLOGY | Facility: CLINIC | Age: 83
End: 2023-05-09
Payer: MEDICARE

## 2023-05-09 ENCOUNTER — LAB VISIT (OUTPATIENT)
Dept: LAB | Facility: HOSPITAL | Age: 83
End: 2023-05-09
Payer: MEDICARE

## 2023-05-09 VITALS
HEART RATE: 66 BPM | WEIGHT: 171.5 LBS | SYSTOLIC BLOOD PRESSURE: 135 MMHG | BODY MASS INDEX: 28.57 KG/M2 | HEIGHT: 65 IN | DIASTOLIC BLOOD PRESSURE: 67 MMHG

## 2023-05-09 DIAGNOSIS — Z79.01 ANTICOAGULANT LONG-TERM USE: Primary | ICD-10-CM

## 2023-05-09 DIAGNOSIS — Z79.01 ANTICOAGULANT LONG-TERM USE: ICD-10-CM

## 2023-05-09 DIAGNOSIS — N18.32 STAGE 3B CHRONIC KIDNEY DISEASE: ICD-10-CM

## 2023-05-09 DIAGNOSIS — Z95.2 H/O MECHANICAL AORTIC VALVE REPLACEMENT: ICD-10-CM

## 2023-05-09 DIAGNOSIS — R04.0 RECURRENT EPISTAXIS: ICD-10-CM

## 2023-05-09 DIAGNOSIS — Z79.01 LONG TERM CURRENT USE OF ANTICOAGULANT THERAPY: ICD-10-CM

## 2023-05-09 DIAGNOSIS — R04.0 ACUTE ANTERIOR EPISTAXIS: Primary | ICD-10-CM

## 2023-05-09 LAB
INR PPP: 3.8 (ref 0.8–1.2)
PROTHROMBIN TIME: 37.1 SEC (ref 9–12.5)

## 2023-05-09 PROCEDURE — 99213 PR OFFICE/OUTPT VISIT, EST, LEVL III, 20-29 MIN: ICD-10-PCS | Mod: 25,S$GLB,, | Performed by: STUDENT IN AN ORGANIZED HEALTH CARE EDUCATION/TRAINING PROGRAM

## 2023-05-09 PROCEDURE — 1159F MED LIST DOCD IN RCRD: CPT | Mod: CPTII,S$GLB,, | Performed by: STUDENT IN AN ORGANIZED HEALTH CARE EDUCATION/TRAINING PROGRAM

## 2023-05-09 PROCEDURE — 3288F FALL RISK ASSESSMENT DOCD: CPT | Mod: CPTII,S$GLB,, | Performed by: STUDENT IN AN ORGANIZED HEALTH CARE EDUCATION/TRAINING PROGRAM

## 2023-05-09 PROCEDURE — 1160F RVW MEDS BY RX/DR IN RCRD: CPT | Mod: CPTII,S$GLB,, | Performed by: STUDENT IN AN ORGANIZED HEALTH CARE EDUCATION/TRAINING PROGRAM

## 2023-05-09 PROCEDURE — 1126F PR PAIN SEVERITY QUANTIFIED, NO PAIN PRESENT: ICD-10-PCS | Mod: CPTII,S$GLB,, | Performed by: STUDENT IN AN ORGANIZED HEALTH CARE EDUCATION/TRAINING PROGRAM

## 2023-05-09 PROCEDURE — 99213 OFFICE O/P EST LOW 20 MIN: CPT | Mod: 25,S$GLB,, | Performed by: STUDENT IN AN ORGANIZED HEALTH CARE EDUCATION/TRAINING PROGRAM

## 2023-05-09 PROCEDURE — 85610 PROTHROMBIN TIME: CPT | Performed by: INTERNAL MEDICINE

## 2023-05-09 PROCEDURE — 3075F SYST BP GE 130 - 139MM HG: CPT | Mod: CPTII,S$GLB,, | Performed by: STUDENT IN AN ORGANIZED HEALTH CARE EDUCATION/TRAINING PROGRAM

## 2023-05-09 PROCEDURE — 3072F LOW RISK FOR RETINOPATHY: CPT | Mod: CPTII,S$GLB,, | Performed by: STUDENT IN AN ORGANIZED HEALTH CARE EDUCATION/TRAINING PROGRAM

## 2023-05-09 PROCEDURE — 1101F PT FALLS ASSESS-DOCD LE1/YR: CPT | Mod: CPTII,S$GLB,, | Performed by: STUDENT IN AN ORGANIZED HEALTH CARE EDUCATION/TRAINING PROGRAM

## 2023-05-09 PROCEDURE — 3075F PR MOST RECENT SYSTOLIC BLOOD PRESS GE 130-139MM HG: ICD-10-PCS | Mod: CPTII,S$GLB,, | Performed by: STUDENT IN AN ORGANIZED HEALTH CARE EDUCATION/TRAINING PROGRAM

## 2023-05-09 PROCEDURE — 1101F PR PT FALLS ASSESS DOC 0-1 FALLS W/OUT INJ PAST YR: ICD-10-PCS | Mod: CPTII,S$GLB,, | Performed by: STUDENT IN AN ORGANIZED HEALTH CARE EDUCATION/TRAINING PROGRAM

## 2023-05-09 PROCEDURE — 3072F PR LOW RISK FOR RETINOPATHY: ICD-10-PCS | Mod: CPTII,S$GLB,, | Performed by: STUDENT IN AN ORGANIZED HEALTH CARE EDUCATION/TRAINING PROGRAM

## 2023-05-09 PROCEDURE — 1159F PR MEDICATION LIST DOCUMENTED IN MEDICAL RECORD: ICD-10-PCS | Mod: CPTII,S$GLB,, | Performed by: STUDENT IN AN ORGANIZED HEALTH CARE EDUCATION/TRAINING PROGRAM

## 2023-05-09 PROCEDURE — 3078F DIAST BP <80 MM HG: CPT | Mod: CPTII,S$GLB,, | Performed by: STUDENT IN AN ORGANIZED HEALTH CARE EDUCATION/TRAINING PROGRAM

## 2023-05-09 PROCEDURE — 30906 PR REPEAT CONTROL OF 2SEBLEED: ICD-10-PCS | Mod: S$GLB,,, | Performed by: STUDENT IN AN ORGANIZED HEALTH CARE EDUCATION/TRAINING PROGRAM

## 2023-05-09 PROCEDURE — 36415 COLL VENOUS BLD VENIPUNCTURE: CPT | Performed by: INTERNAL MEDICINE

## 2023-05-09 PROCEDURE — 3288F PR FALLS RISK ASSESSMENT DOCUMENTED: ICD-10-PCS | Mod: CPTII,S$GLB,, | Performed by: STUDENT IN AN ORGANIZED HEALTH CARE EDUCATION/TRAINING PROGRAM

## 2023-05-09 PROCEDURE — 99999 PR PBB SHADOW E&M-EST. PATIENT-LVL III: ICD-10-PCS | Mod: PBBFAC,,, | Performed by: STUDENT IN AN ORGANIZED HEALTH CARE EDUCATION/TRAINING PROGRAM

## 2023-05-09 PROCEDURE — 1126F AMNT PAIN NOTED NONE PRSNT: CPT | Mod: CPTII,S$GLB,, | Performed by: STUDENT IN AN ORGANIZED HEALTH CARE EDUCATION/TRAINING PROGRAM

## 2023-05-09 PROCEDURE — 1160F PR REVIEW ALL MEDS BY PRESCRIBER/CLIN PHARMACIST DOCUMENTED: ICD-10-PCS | Mod: CPTII,S$GLB,, | Performed by: STUDENT IN AN ORGANIZED HEALTH CARE EDUCATION/TRAINING PROGRAM

## 2023-05-09 PROCEDURE — 3078F PR MOST RECENT DIASTOLIC BLOOD PRESSURE < 80 MM HG: ICD-10-PCS | Mod: CPTII,S$GLB,, | Performed by: STUDENT IN AN ORGANIZED HEALTH CARE EDUCATION/TRAINING PROGRAM

## 2023-05-09 PROCEDURE — 99999 PR PBB SHADOW E&M-EST. PATIENT-LVL III: CPT | Mod: PBBFAC,,, | Performed by: STUDENT IN AN ORGANIZED HEALTH CARE EDUCATION/TRAINING PROGRAM

## 2023-05-09 PROCEDURE — 30906 REPEAT CONTROL OF NOSEBLEED: CPT | Mod: S$GLB,,, | Performed by: STUDENT IN AN ORGANIZED HEALTH CARE EDUCATION/TRAINING PROGRAM

## 2023-05-09 NOTE — PROGRESS NOTES
"    Subjective:      Dariel is a 82 y.o. male who comes for follow-up of  epistaxis .  His last visit with me was on 3/9/2023.  Still having episodic left sided bleeding. Occurs almost daily. He is able to control it with pressure and tissues. Has not bled for 2 days.     His current sinus regime consists of: Afrin PRN for bleeding.     The patient's medications, allergies, past medical, surgical, social and family histories were reviewed and updated as appropriate.    A detailed review of systems was obtained with pertinent positives as per the above HPI, and otherwise negative.        Objective:     /67 (BP Location: Left arm, Patient Position: Sitting, BP Method: Large (Automatic))   Pulse 66   Ht 5' 5" (1.651 m)   Wt 77.8 kg (171 lb 8.3 oz)   BMI 28.54 kg/m²        Constitutional:   Vital signs are normal. He appears well-developed and well-nourished.     Head:  Normocephalic and atraumatic.     Ears:  Hearing normal to normal and whispered voice; external ear normal without scars, lesions, or masses; ear canal, tympanic membrane, and middle ear normal..   Right Ear: No swelling. Tympanic membrane is not perforated and not bulging. No middle ear effusion.   Left Ear: No swelling. Tympanic membrane is not perforated and not bulging.  No middle ear effusion.     Nose:  Nose normal including turbinates, nasal mucosa, sinuses and nasal septum. No epistaxis.     Mouth/Throat  Oropharynx clear and moist without lesions or asymmetry and normal uvula midline. Normal dentition. No tonsillar abscesses. Tonsillar exudate.      Neck:  Neck normal without thyromegaly masses, asymmetry, normal tracheal structure, crepitus, and tenderness, thyroid normal, trachea normal, phonation normal, full range of motion with neck supple and no adenopathy. No stridor present.        Head (right side): No submental adenopathy present.        Head (left side): No submental adenopathy present.     He has no cervical adenopathy. "     Pulmonary/Chest:   No stridor.     Procedure    Nasal Endoscopy with Repeat Control of Epistaxis    After written consent was obtained the nose was anesthetized with topical pontocaine and phenylephrine pledgets.   silver nitrate was used to cauterize the prominent vascularity on the left anterior septum and head of left middle turbinate.  The patient tolerated the procedure well and there were no complications.  Hemostasis was obtained.  Bacitracin ointment was placed after cauterization.                Data Reviewed    WBC (K/uL)   Date Value   05/09/2023 6.04     Eosinophil % (%)   Date Value   05/09/2023 1.8     Eos # (K/uL)   Date Value   05/09/2023 0.1     Platelets (K/uL)   Date Value   05/09/2023 173     Glucose (mg/dL)   Date Value   04/18/2023 100     No results found for: IGE    No sinus imaging available.    Assessment:     1. Acute anterior epistaxis    2. Anticoagulant long-term use    3. Stage 3b chronic kidney disease    4. Recurrent epistaxis      Plan:     - Will draw CBC  - Left sided cautery performed, suspect some prominent granulation tissue still healing after RR placement.   - RTC 2 weeks  - No nose blowing  - Afrin PRN for bleeding    Jono Russell MD

## 2023-05-11 ENCOUNTER — PES CALL (OUTPATIENT)
Dept: ADMINISTRATIVE | Facility: CLINIC | Age: 83
End: 2023-05-11
Payer: MEDICARE

## 2023-05-12 ENCOUNTER — TELEPHONE (OUTPATIENT)
Dept: CARDIOLOGY | Facility: CLINIC | Age: 83
End: 2023-05-12
Payer: MEDICARE

## 2023-05-12 NOTE — TELEPHONE ENCOUNTER
Reached out to patient today.    Spoke to Mrs. Urbina  and we scheduled patient    An EKG  as requested by .        Nw

## 2023-05-16 ENCOUNTER — LAB VISIT (OUTPATIENT)
Dept: LAB | Facility: HOSPITAL | Age: 83
End: 2023-05-16
Payer: MEDICARE

## 2023-05-16 ENCOUNTER — ANTI-COAG VISIT (OUTPATIENT)
Dept: CARDIOLOGY | Facility: CLINIC | Age: 83
End: 2023-05-16
Payer: MEDICARE

## 2023-05-16 ENCOUNTER — OFFICE VISIT (OUTPATIENT)
Dept: INTERNAL MEDICINE | Facility: CLINIC | Age: 83
End: 2023-05-16
Payer: MEDICARE

## 2023-05-16 VITALS
HEART RATE: 75 BPM | BODY MASS INDEX: 28.95 KG/M2 | SYSTOLIC BLOOD PRESSURE: 150 MMHG | HEIGHT: 64 IN | WEIGHT: 169.56 LBS | DIASTOLIC BLOOD PRESSURE: 76 MMHG | OXYGEN SATURATION: 97 %

## 2023-05-16 DIAGNOSIS — Z79.01 ANTICOAGULANT LONG-TERM USE: ICD-10-CM

## 2023-05-16 DIAGNOSIS — Z95.2 H/O MECHANICAL AORTIC VALVE REPLACEMENT: ICD-10-CM

## 2023-05-16 DIAGNOSIS — I48.0 PAROXYSMAL ATRIAL FIBRILLATION: ICD-10-CM

## 2023-05-16 DIAGNOSIS — R04.0 EPISTAXIS: ICD-10-CM

## 2023-05-16 DIAGNOSIS — Z86.010 HX OF COLONIC POLYP: ICD-10-CM

## 2023-05-16 DIAGNOSIS — Z79.01 ANTICOAGULANT LONG-TERM USE: Primary | ICD-10-CM

## 2023-05-16 DIAGNOSIS — N18.4 CKD (CHRONIC KIDNEY DISEASE) STAGE 4, GFR 15-29 ML/MIN: ICD-10-CM

## 2023-05-16 DIAGNOSIS — Z87.39 HISTORY OF GOUT: ICD-10-CM

## 2023-05-16 DIAGNOSIS — E11.51 TYPE 2 DIABETES MELLITUS WITH DIABETIC PERIPHERAL ANGIOPATHY WITHOUT GANGRENE, WITHOUT LONG-TERM CURRENT USE OF INSULIN: ICD-10-CM

## 2023-05-16 DIAGNOSIS — D64.9 ANEMIA, UNSPECIFIED TYPE: ICD-10-CM

## 2023-05-16 LAB
INR PPP: 4.6 (ref 0.8–1.2)
PROTHROMBIN TIME: 44.5 SEC (ref 9–12.5)

## 2023-05-16 PROCEDURE — 3072F LOW RISK FOR RETINOPATHY: CPT | Mod: CPTII,,, | Performed by: INTERNAL MEDICINE

## 2023-05-16 PROCEDURE — 3288F FALL RISK ASSESSMENT DOCD: CPT | Mod: CPTII,,, | Performed by: INTERNAL MEDICINE

## 2023-05-16 PROCEDURE — 3072F PR LOW RISK FOR RETINOPATHY: ICD-10-PCS | Mod: CPTII,,, | Performed by: INTERNAL MEDICINE

## 2023-05-16 PROCEDURE — 3077F SYST BP >= 140 MM HG: CPT | Mod: CPTII,,, | Performed by: INTERNAL MEDICINE

## 2023-05-16 PROCEDURE — 1101F PT FALLS ASSESS-DOCD LE1/YR: CPT | Mod: CPTII,,, | Performed by: INTERNAL MEDICINE

## 2023-05-16 PROCEDURE — 1126F PR PAIN SEVERITY QUANTIFIED, NO PAIN PRESENT: ICD-10-PCS | Mod: CPTII,,, | Performed by: INTERNAL MEDICINE

## 2023-05-16 PROCEDURE — 1126F AMNT PAIN NOTED NONE PRSNT: CPT | Mod: CPTII,,, | Performed by: INTERNAL MEDICINE

## 2023-05-16 PROCEDURE — 1159F MED LIST DOCD IN RCRD: CPT | Mod: CPTII,,, | Performed by: INTERNAL MEDICINE

## 2023-05-16 PROCEDURE — 99214 PR OFFICE/OUTPT VISIT, EST, LEVL IV, 30-39 MIN: ICD-10-PCS | Mod: ,,, | Performed by: INTERNAL MEDICINE

## 2023-05-16 PROCEDURE — 99999 PR PBB SHADOW E&M-EST. PATIENT-LVL III: ICD-10-PCS | Mod: PBBFAC,,, | Performed by: INTERNAL MEDICINE

## 2023-05-16 PROCEDURE — 99999 PR PBB SHADOW E&M-EST. PATIENT-LVL III: CPT | Mod: PBBFAC,,, | Performed by: INTERNAL MEDICINE

## 2023-05-16 PROCEDURE — 3288F PR FALLS RISK ASSESSMENT DOCUMENTED: ICD-10-PCS | Mod: CPTII,,, | Performed by: INTERNAL MEDICINE

## 2023-05-16 PROCEDURE — 3077F PR MOST RECENT SYSTOLIC BLOOD PRESSURE >= 140 MM HG: ICD-10-PCS | Mod: CPTII,,, | Performed by: INTERNAL MEDICINE

## 2023-05-16 PROCEDURE — 93793 PR ANTICOAGULANT MGMT FOR PT TAKING WARFARIN: ICD-10-PCS | Mod: ,,,

## 2023-05-16 PROCEDURE — 3078F PR MOST RECENT DIASTOLIC BLOOD PRESSURE < 80 MM HG: ICD-10-PCS | Mod: CPTII,,, | Performed by: INTERNAL MEDICINE

## 2023-05-16 PROCEDURE — 3078F DIAST BP <80 MM HG: CPT | Mod: CPTII,,, | Performed by: INTERNAL MEDICINE

## 2023-05-16 PROCEDURE — 1101F PR PT FALLS ASSESS DOC 0-1 FALLS W/OUT INJ PAST YR: ICD-10-PCS | Mod: CPTII,,, | Performed by: INTERNAL MEDICINE

## 2023-05-16 PROCEDURE — 1159F PR MEDICATION LIST DOCUMENTED IN MEDICAL RECORD: ICD-10-PCS | Mod: CPTII,,, | Performed by: INTERNAL MEDICINE

## 2023-05-16 PROCEDURE — 85610 PROTHROMBIN TIME: CPT | Performed by: INTERNAL MEDICINE

## 2023-05-16 PROCEDURE — 93793 ANTICOAG MGMT PT WARFARIN: CPT | Mod: ,,,

## 2023-05-16 PROCEDURE — 99214 OFFICE O/P EST MOD 30 MIN: CPT | Mod: ,,, | Performed by: INTERNAL MEDICINE

## 2023-05-16 PROCEDURE — 36415 COLL VENOUS BLD VENIPUNCTURE: CPT | Performed by: INTERNAL MEDICINE

## 2023-05-16 NOTE — PROGRESS NOTES
INR not at goal. Medications, chart, and patient findings reviewed. See calendar for adjustments to dose and follow up plan.  Recommend to decrease maintenance dose & re-assess.

## 2023-05-16 NOTE — PROGRESS NOTES
81 yo M here for follow up his medical problems.   I saw him in Feb 14, 2023.         He fell in the bathroom in early Feb and broke his 7 and 8 th rib on the right side.  That has healed and he has has not had any falls.      He has diabetes-- We stopped his glimepiride due to low glucoses.  Recent hgb A1c was 5.2              He has htn.  He is back on  coreg 25 , bumex 1 mg q d, imdur 60 mg a day  and low dose lisinopril 2.5 mg a day    CAD s/p CABG (1990s), mechanical AVR (on warfarin), moderate-severe MR--MitraClip.  KIRSTEN performed 4/14 and showed moderate MR.  Hs last cardiovascular stent was in November of 2021.  He has had severe calcific atherosclerosis seen on multiple ct scans over time.            He continues to have epistaxis - He is seeing ENt and has a follow up in couple weeks.  .   He is on both Coumadin and Plavix any has history of anemia.  JORY Brown been seen by  ENT yesterday and they cauterized his nose .    He has had a couple nose bleeds but they are better.        He had COVID4   Months  ago.  He came to the hospital he was given Remdesiver.  He says that he has pretty much recovered from the COVID.  He denies any shortness of breath or cough.          Diabetes mellitus type 2. Last A1c was  5.4   -- . He was put on amaryl 1mg last visit.   Weight today is 176 # lbs-   . . Patient denies polyuria, polydipsia, visual disturbances.         Hyperlipidemia. On Crestor 40  mg , --Recent lipid panel was reviewed.  . He says he is taking all his cholesterol meds. Chol 1115, hdl 37, Ldl 47   2/28/23-              CKD stage IV  Pt saw Nephrology in March 2022 -He has a floow up with them later this month.  -   He has had some acute on chronic kidney failure.  Most recent creatinine is 2.1.    I year ago his creatinine was 2.3.   His potasium is 5.1.  He has been instructed on a a low potasium diet by nephrology, but is not following one.           H/o partial colon resection due to diverticulosis.  "See above.  NO GI  bleeding since last visit.  NO recent diarrhea.                AAA- last us Showed 3.32 cm AAA- (9/29/22 )largest supra renal area. since last visit -            H/o gout. On allopurinol. Last flare was  24 mo ago in left foot. He remains on Allopurinol.               Review of Systems    Constitutional: Negative for fever, chills and unexpected weight change.    HENT: Negative for congestion, rhinorrhea and sinus pressure.  Nose blleds as above.    Eyes: Negative for visual disturbance.    Respiratory: He Has some SOB,but better since his mitral valve clip/         Cardiovascular:as above.  .    Gastrointestinal: Negative for nausea, vomiting, abdominal pain, and constipation.  He does have some diarrhea-  - as above.    Genitourinary: Negative for dysuria, urgency and difficulty urinating.    Musculoskeletal: He reports his back- that was bothering him last vsiit- is not bothering him recently.    Skin: Negative.    Neurological: Negative for dizziness, syncope and headaches.    Hematological: Negative.    Psychiatric/Behavioral: Negative.    All other systems reviewed and are negative.    Objective:          BP (!) 150/76 (BP Location: Right arm, Patient Position: Sitting, BP Method: Medium (Manual))   Pulse 75   Ht 5' 4" (1.626 m)   Wt 76.9 kg (169 lb 8.5 oz)   SpO2 97%   BMI 29.10 kg/m²         Constitutional: He is oriented to person, place, and time. He appears well-developed and well-nourished. No distress.    HENT:    Head: Normocephalic and atraumatic. He has a benign lesion on his left check.    Mouth/Throat: Oropharynx is clear and moist. No oropharyngeal exudate.    Eyes: Conjunctivae and EOM are normal.    Neck: Normal range of motion. Neck supple. No JVD present. No tracheal deviation present.    Cardiovascular: Regular rhythm and normal heart sounds. Exam reveals no gallop and no friction rub.  metalic click.   Systolic murmur heard.     Pulmonary/Chest: Effort normal and " breath sounds normal. No respiratory distress. He has no wheezes. He has no rales.    Abdominal: Soft. Bowel sounds are normal. He exhibits no distension. There is no tenderness. There is no rebound.   Musculoskeletal: Normal range of motion. He exhibits no edema and no tenderness.   Neurological: He is alert and oriented to person, place, and time.    Skin: Skin is warm and dry. No rash noted. He is not diaphoretic. No erythema.   Psychiatric: He has a normal mood and affect. His behavior is normal.       He hs a hyperkeratotic spot on the top of his forehead.                   Assessment:      1.   DM (diabetes mellitus)     2.   Hyperlipidemia     3.   Chronic kidney disease, stage 4   4.   History of colon resection     5.   Hypertension     6.   CAD (coronary atherosclerotic disease)     7.    8.  9.  10.  History of aortic valve replacement and MV repair.   AAA  AVR-mech- on coumadin   Colon polyps--    anemia   Plan:            Follow up with nephrology and ENT>    Following with coumadin clinc-- I will see him backl in 6 months with cmp and hgb A1c.

## 2023-05-22 ENCOUNTER — ANTI-COAG VISIT (OUTPATIENT)
Dept: CARDIOLOGY | Facility: CLINIC | Age: 83
End: 2023-05-22
Payer: MEDICARE

## 2023-05-22 ENCOUNTER — TELEPHONE (OUTPATIENT)
Dept: GASTROENTEROLOGY | Facility: CLINIC | Age: 83
End: 2023-05-22
Payer: MEDICARE

## 2023-05-22 ENCOUNTER — TELEPHONE (OUTPATIENT)
Dept: INTERNAL MEDICINE | Facility: CLINIC | Age: 83
End: 2023-05-22
Payer: MEDICARE

## 2023-05-22 ENCOUNTER — LAB VISIT (OUTPATIENT)
Dept: LAB | Facility: HOSPITAL | Age: 83
End: 2023-05-22
Attending: INTERNAL MEDICINE
Payer: MEDICARE

## 2023-05-22 ENCOUNTER — OFFICE VISIT (OUTPATIENT)
Dept: NEPHROLOGY | Facility: CLINIC | Age: 83
End: 2023-05-22
Payer: MEDICARE

## 2023-05-22 VITALS
HEART RATE: 65 BPM | DIASTOLIC BLOOD PRESSURE: 62 MMHG | SYSTOLIC BLOOD PRESSURE: 130 MMHG | OXYGEN SATURATION: 95 % | WEIGHT: 167.56 LBS | BODY MASS INDEX: 28.76 KG/M2

## 2023-05-22 DIAGNOSIS — Z79.01 ANTICOAGULANT LONG-TERM USE: Primary | ICD-10-CM

## 2023-05-22 DIAGNOSIS — E79.0 HYPERURICEMIA: ICD-10-CM

## 2023-05-22 DIAGNOSIS — D50.9 IRON DEFICIENCY ANEMIA, UNSPECIFIED IRON DEFICIENCY ANEMIA TYPE: ICD-10-CM

## 2023-05-22 DIAGNOSIS — E11.22 TYPE 2 DM WITH CKD STAGE 4 AND HYPERTENSION: ICD-10-CM

## 2023-05-22 DIAGNOSIS — D63.1 ANEMIA IN STAGE 4 CHRONIC KIDNEY DISEASE: ICD-10-CM

## 2023-05-22 DIAGNOSIS — I12.9 TYPE 2 DM WITH CKD STAGE 4 AND HYPERTENSION: ICD-10-CM

## 2023-05-22 DIAGNOSIS — Z95.2 H/O MECHANICAL AORTIC VALVE REPLACEMENT: ICD-10-CM

## 2023-05-22 DIAGNOSIS — N18.4 ANEMIA IN STAGE 4 CHRONIC KIDNEY DISEASE: ICD-10-CM

## 2023-05-22 DIAGNOSIS — Z79.01 ANTICOAGULANT LONG-TERM USE: ICD-10-CM

## 2023-05-22 DIAGNOSIS — E87.20 ACIDOSIS: ICD-10-CM

## 2023-05-22 DIAGNOSIS — R80.9 PROTEINURIA, UNSPECIFIED TYPE: ICD-10-CM

## 2023-05-22 DIAGNOSIS — D53.9 MACROCYTIC ANEMIA: Primary | ICD-10-CM

## 2023-05-22 DIAGNOSIS — N18.4 STAGE 4 CHRONIC KIDNEY DISEASE: Primary | ICD-10-CM

## 2023-05-22 DIAGNOSIS — N18.4 TYPE 2 DM WITH CKD STAGE 4 AND HYPERTENSION: ICD-10-CM

## 2023-05-22 DIAGNOSIS — N25.81 SECONDARY HYPERPARATHYROIDISM OF RENAL ORIGIN: ICD-10-CM

## 2023-05-22 DIAGNOSIS — N18.4 STAGE 4 CHRONIC KIDNEY DISEASE: ICD-10-CM

## 2023-05-22 LAB
BILIRUB UR QL STRIP: NEGATIVE
CLARITY UR REFRACT.AUTO: CLEAR
COLOR UR AUTO: COLORLESS
CREAT UR-MCNC: 25 MG/DL (ref 23–375)
GLUCOSE UR QL STRIP: NEGATIVE
HGB UR QL STRIP: ABNORMAL
INR PPP: 3.7 (ref 0.8–1.2)
KETONES UR QL STRIP: NEGATIVE
LEUKOCYTE ESTERASE UR QL STRIP: NEGATIVE
NITRITE UR QL STRIP: NEGATIVE
PH UR STRIP: 6 [PH] (ref 5–8)
PROT UR QL STRIP: ABNORMAL
PROT UR-MCNC: 37 MG/DL (ref 0–15)
PROT/CREAT UR: 1.48 MG/G{CREAT} (ref 0–0.2)
PROTHROMBIN TIME: 36.8 SEC (ref 9–12.5)
PTH-INTACT SERPL-MCNC: 520.1 PG/ML (ref 9–77)
SP GR UR STRIP: 1.01 (ref 1–1.03)
URN SPEC COLLECT METH UR: ABNORMAL

## 2023-05-22 PROCEDURE — 3078F PR MOST RECENT DIASTOLIC BLOOD PRESSURE < 80 MM HG: ICD-10-PCS | Mod: CPTII,S$GLB,, | Performed by: NURSE PRACTITIONER

## 2023-05-22 PROCEDURE — 99999 PR PBB SHADOW E&M-EST. PATIENT-LVL III: CPT | Mod: PBBFAC,,, | Performed by: NURSE PRACTITIONER

## 2023-05-22 PROCEDURE — 81003 URINALYSIS AUTO W/O SCOPE: CPT | Performed by: NURSE PRACTITIONER

## 2023-05-22 PROCEDURE — 99999 PR PBB SHADOW E&M-EST. PATIENT-LVL III: ICD-10-PCS | Mod: PBBFAC,,, | Performed by: NURSE PRACTITIONER

## 2023-05-22 PROCEDURE — 3072F PR LOW RISK FOR RETINOPATHY: ICD-10-PCS | Mod: CPTII,S$GLB,, | Performed by: NURSE PRACTITIONER

## 2023-05-22 PROCEDURE — 99214 OFFICE O/P EST MOD 30 MIN: CPT | Mod: S$GLB,,, | Performed by: NURSE PRACTITIONER

## 2023-05-22 PROCEDURE — 83970 ASSAY OF PARATHORMONE: CPT | Performed by: NURSE PRACTITIONER

## 2023-05-22 PROCEDURE — 36415 COLL VENOUS BLD VENIPUNCTURE: CPT | Performed by: INTERNAL MEDICINE

## 2023-05-22 PROCEDURE — 1126F PR PAIN SEVERITY QUANTIFIED, NO PAIN PRESENT: ICD-10-PCS | Mod: CPTII,S$GLB,, | Performed by: NURSE PRACTITIONER

## 2023-05-22 PROCEDURE — 3288F PR FALLS RISK ASSESSMENT DOCUMENTED: ICD-10-PCS | Mod: CPTII,S$GLB,, | Performed by: NURSE PRACTITIONER

## 2023-05-22 PROCEDURE — 93793 PR ANTICOAGULANT MGMT FOR PT TAKING WARFARIN: ICD-10-PCS | Mod: S$GLB,,,

## 2023-05-22 PROCEDURE — 1126F AMNT PAIN NOTED NONE PRSNT: CPT | Mod: CPTII,S$GLB,, | Performed by: NURSE PRACTITIONER

## 2023-05-22 PROCEDURE — 3072F LOW RISK FOR RETINOPATHY: CPT | Mod: CPTII,S$GLB,, | Performed by: NURSE PRACTITIONER

## 2023-05-22 PROCEDURE — 1101F PT FALLS ASSESS-DOCD LE1/YR: CPT | Mod: CPTII,S$GLB,, | Performed by: NURSE PRACTITIONER

## 2023-05-22 PROCEDURE — 1101F PR PT FALLS ASSESS DOC 0-1 FALLS W/OUT INJ PAST YR: ICD-10-PCS | Mod: CPTII,S$GLB,, | Performed by: NURSE PRACTITIONER

## 2023-05-22 PROCEDURE — 99214 PR OFFICE/OUTPT VISIT, EST, LEVL IV, 30-39 MIN: ICD-10-PCS | Mod: S$GLB,,, | Performed by: NURSE PRACTITIONER

## 2023-05-22 PROCEDURE — 3288F FALL RISK ASSESSMENT DOCD: CPT | Mod: CPTII,S$GLB,, | Performed by: NURSE PRACTITIONER

## 2023-05-22 PROCEDURE — 3075F SYST BP GE 130 - 139MM HG: CPT | Mod: CPTII,S$GLB,, | Performed by: NURSE PRACTITIONER

## 2023-05-22 PROCEDURE — 99213 OFFICE O/P EST LOW 20 MIN: CPT | Performed by: NURSE PRACTITIONER

## 2023-05-22 PROCEDURE — 85610 PROTHROMBIN TIME: CPT | Performed by: INTERNAL MEDICINE

## 2023-05-22 PROCEDURE — 84156 ASSAY OF PROTEIN URINE: CPT | Performed by: NURSE PRACTITIONER

## 2023-05-22 PROCEDURE — 3075F PR MOST RECENT SYSTOLIC BLOOD PRESS GE 130-139MM HG: ICD-10-PCS | Mod: CPTII,S$GLB,, | Performed by: NURSE PRACTITIONER

## 2023-05-22 PROCEDURE — 3078F DIAST BP <80 MM HG: CPT | Mod: CPTII,S$GLB,, | Performed by: NURSE PRACTITIONER

## 2023-05-22 PROCEDURE — 93793 ANTICOAG MGMT PT WARFARIN: CPT | Mod: S$GLB,,,

## 2023-05-22 NOTE — TELEPHONE ENCOUNTER
----- Message from Eric Figueroa sent at 5/22/2023  1:32 PM CDT -----  Contact: [phone] @ patient  Good afternoon patient wife would like a call about refill on vitamins for patient

## 2023-05-22 NOTE — PROGRESS NOTES
Subjective:       Patient ID: Dariel Urbina is a 82 y.o. white male who presents for follow-up evaluation of CKD.    HPI     Patient is new to me. Last seen by Dr. Shaffer in 3/11/22 for CKD IIIb.  Prior pertinent chart reviewed since this is patient's first appointment with me.  Patient presents with his wife for follow-up of CKD.  Baseline creatinine of CKD IIIb-IV, h/o JIM, HLD, h/o gout, h/o mechanical aortic valve replacement on warfarin, T2DM, CAD s/p CABG, HTN, chronic combined systolic and diastolic heart failure, pAF, mitral insufficiency. Noted several admissions for intermittent CHF exacerbation, supratherapeutic INR, anemia related to epistaxis. Recently presented for outpt KIRSTEN on 4/11/23 for mitralclip eval, however INR supratherapeutic (4.9) on presentation. KIRSTEN performed on 4/14/23 showed moderate mitral regurgitation. Recent JIM on last admission with peak sCr of 3.5. US performed showed bladder mass with concerns for obstructive uropathy and recommendations for Urology eval. Renal function improved with no need for Urology intervention as an inpatient. Patient continued to follow-up as an outpatient with repeat CT without contrast showing no bladder mass or abnormalities. No urologic intervention necessary. Most recent sCr 2.3 in April. Will repeat labs. He denies volume issues on bumex 1mg qod. He denies urinary symptoms. BP stable today. The patient denies taking NSAIDs, herbal supplements, or new antibiotics, recreational drugs, recent episode of dehydration, diarrhea, nausea or vomiting, acute illness. He received contrast with angiogram on 3/2/23.       Significant family hx includes: Mother, father, brother- heart failure/ heart disease      Review of Systems   Constitutional:  Negative for fever.   Respiratory:  Negative for shortness of breath.    Cardiovascular:  Positive for leg swelling. Negative for chest pain.   Gastrointestinal:  Negative for diarrhea, nausea and vomiting.    Genitourinary:  Negative for difficulty urinating, dysuria and hematuria.   All other systems reviewed and are negative.    Objective:       Blood pressure 130/62, pulse 65, weight 76 kg (167 lb 8.8 oz), SpO2 95 %.  Physical Exam  Vitals reviewed.   Constitutional:       General: He is not in acute distress.  HENT:      Head: Normocephalic and atraumatic.   Eyes:      General: No scleral icterus.  Cardiovascular:      Rate and Rhythm: Normal rate and regular rhythm.   Pulmonary:      Effort: Pulmonary effort is normal. No respiratory distress.      Breath sounds: No wheezing or rales.   Musculoskeletal:      Comments: Trace BLE edema   Skin:     General: Skin is warm and dry.   Neurological:      Mental Status: He is alert.   Psychiatric:         Mood and Affect: Mood normal.         Behavior: Behavior normal.         Lab Results   Component Value Date    CREATININE 2.3 (H) 04/18/2023    URICACID 8.5 (H) 10/30/2006     Prot/Creat Ratio, Urine   Date Value Ref Range Status   04/13/2023 0.99 (H) 0.00 - 0.20 Final   03/15/2022 0.34 (H) 0.00 - 0.20 Final   11/19/2019 1.24 (H) 0.00 - 0.20 Final     Lab Results   Component Value Date     04/18/2023    K 5.1 04/18/2023    CO2 19 (L) 04/18/2023     04/18/2023     Lab Results   Component Value Date    .0 (H) 03/15/2022    CALCIUM 10.7 (H) 04/18/2023    CAION 1.29 03/20/2018    PHOS 3.9 04/15/2023     Lab Results   Component Value Date    HGB 9.4 (L) 05/09/2023    WBC 6.04 05/09/2023    HCT 30.3 (L) 05/09/2023      Lab Results   Component Value Date    HGBA1C 5.2 02/01/2023     05/09/2023    BUN 59 (H) 04/18/2023     Lab Results   Component Value Date    LDLCALC 47.0 (L) 02/28/2023         Assessment:       1. Stage 4 chronic kidney disease    2. Proteinuria, unspecified type    3. Acidosis    4. Secondary hyperparathyroidism of renal origin    5. Anemia in stage 4 chronic kidney disease    6. Type 2 DM with CKD stage 4 and hypertension    7.  Hyperuricemia        Plan:   CKD stage IV c eGFR 27.8 mL/min -  - Baseline Cr ~1.7-2.0, last at baseline around Dec 2022, now trending > 2  - CKD 2/2 HTN, history of AKIs  - Continue glycemic control. T2DM diet controlled. Continue BP control. Monitor volume status. Agree with bumex.   - Peak Cr 3.5 in April 2022, unclear etiology, now trending closer to 2.3, possible progression  - CMP ordered per PCP, will follow    UPCR 990mg/g; No RAASi with h/o hyperkalemia   Acid-base 19 on last labs, repeat    Renal osteodystrophy/ Secondary hyperparathyroidism , last Ca elevated, phos stable,   Repeat levels    Anemia Below CKD goal, frequent epistaxis on warfarin, following with ENT s/p cauterization    Lipid Management On statin   ESRD planning Deferred today     HTN   - Stable on current regiment    Hyperuricemia  - h/o gout flare, on allopurinol  - No recent uric acid, will monitor     All questions patient had were answered.  Asked if further questions. None. F/u in clinic 3 months  with labs and urine prior to next visit or sooner if needed.  ER for emergency concerns.    Summary of Plan:  - CMP ordered per PCP, will add on urine studies and follow-up  - 3 mo follow-up with standard labs and urine or sooner if needed pending repeat labs

## 2023-05-25 ENCOUNTER — CLINICAL SUPPORT (OUTPATIENT)
Dept: LAB | Facility: HOSPITAL | Age: 83
End: 2023-05-25
Attending: INTERNAL MEDICINE
Payer: MEDICARE

## 2023-05-25 DIAGNOSIS — I15.2 HYPERTENSION ASSOCIATED WITH DIABETES: ICD-10-CM

## 2023-05-25 DIAGNOSIS — E11.59 HYPERTENSION ASSOCIATED WITH DIABETES: ICD-10-CM

## 2023-05-25 DIAGNOSIS — Z01.818 PRE-OP EVALUATION: ICD-10-CM

## 2023-05-25 PROCEDURE — 93010 EKG 12-LEAD: ICD-10-PCS | Mod: ,,, | Performed by: INTERNAL MEDICINE

## 2023-05-25 PROCEDURE — 93005 ELECTROCARDIOGRAM TRACING: CPT

## 2023-05-25 PROCEDURE — 93010 ELECTROCARDIOGRAM REPORT: CPT | Mod: ,,, | Performed by: INTERNAL MEDICINE

## 2023-05-26 DIAGNOSIS — M10.9 GOUT, UNSPECIFIED CAUSE, UNSPECIFIED CHRONICITY, UNSPECIFIED SITE: ICD-10-CM

## 2023-05-26 NOTE — TELEPHONE ENCOUNTER
Care Due:                  Date            Visit Type   Department     Provider  --------------------------------------------------------------------------------                                EP -                              PRIMARY      [facility] INTERNAL  Last Visit: 05-      CARE ([facility])   JOSHUA Langston                              [facility]                              PRIMARY      [facility] INTERNAL  Next Visit: 11-      CARE ([facility])   [facility]       Devon Langston                                                            Last  Test          Frequency    Reason                     Performed    Due Date  --------------------------------------------------------------------------------    Uric Acid...  12 months..  allopurinoL..............  Not Found    Overdue    [employer] Embedded Care Due Messages. Reference number: [id number].   5/26/2023 11:38:24 AM CDT

## 2023-05-26 NOTE — TELEPHONE ENCOUNTER
Refill Routing Note   Medication(s) are not appropriate for processing by [facility] for the following reason(s):      Required labs outdated  Required labs abnormal    [facility] action(s):  Defer Care Due:  Labs due    Uric acid outdated      Appointments  past 12m or future 3m with PCP    Date Provider   Last Visit   5/16/2023 Devon Langston Jr., MD   Next Visit   11/14/2023 Devon Langston Jr., MD   ED visits in past 90 days: 1        Note composed:5:19 PM 05/26/2023

## 2023-05-29 RX ORDER — ALLOPURINOL 100 MG/1
TABLET ORAL
Qty: 90 TABLET | Refills: 0 | Status: SHIPPED | OUTPATIENT
Start: 2023-05-29 | End: 2023-05-31 | Stop reason: SDUPTHER

## 2023-05-29 NOTE — TELEPHONE ENCOUNTER
Patient had his EKG done on 5/25/23. Is patient cleared to proceed with EGD?  Please advise.     ThanksCaren

## 2023-05-31 ENCOUNTER — PATIENT MESSAGE (OUTPATIENT)
Dept: GASTROENTEROLOGY | Facility: CLINIC | Age: 83
End: 2023-05-31
Payer: MEDICARE

## 2023-05-31 ENCOUNTER — ANTI-COAG VISIT (OUTPATIENT)
Dept: CARDIOLOGY | Facility: CLINIC | Age: 83
End: 2023-05-31
Payer: MEDICARE

## 2023-05-31 ENCOUNTER — LAB VISIT (OUTPATIENT)
Dept: LAB | Facility: HOSPITAL | Age: 83
End: 2023-05-31
Attending: INTERNAL MEDICINE
Payer: MEDICARE

## 2023-05-31 ENCOUNTER — TELEPHONE (OUTPATIENT)
Dept: ENDOSCOPY | Facility: HOSPITAL | Age: 83
End: 2023-05-31
Payer: MEDICARE

## 2023-05-31 DIAGNOSIS — D50.9 IRON DEFICIENCY ANEMIA, UNSPECIFIED IRON DEFICIENCY ANEMIA TYPE: ICD-10-CM

## 2023-05-31 DIAGNOSIS — Z95.2 H/O MECHANICAL AORTIC VALVE REPLACEMENT: ICD-10-CM

## 2023-05-31 DIAGNOSIS — Z79.01 ANTICOAGULANT LONG-TERM USE: Primary | ICD-10-CM

## 2023-05-31 DIAGNOSIS — D53.9 MACROCYTIC ANEMIA: ICD-10-CM

## 2023-05-31 DIAGNOSIS — Z79.01 ANTICOAGULANT LONG-TERM USE: ICD-10-CM

## 2023-05-31 DIAGNOSIS — M10.9 GOUT, UNSPECIFIED CAUSE, UNSPECIFIED CHRONICITY, UNSPECIFIED SITE: ICD-10-CM

## 2023-05-31 DIAGNOSIS — D50.9 IRON DEFICIENCY ANEMIA, UNSPECIFIED IRON DEFICIENCY ANEMIA TYPE: Primary | ICD-10-CM

## 2023-05-31 LAB
FERRITIN SERPL-MCNC: 42 NG/ML (ref 20–300)
FOLATE SERPL-MCNC: 8.6 NG/ML (ref 4–24)
HGB BLD-MCNC: 8.6 G/DL (ref 14–18)
IGA SERPL-MCNC: 158 MG/DL (ref 40–350)
INR PPP: 3.2 (ref 0.8–1.2)
IRON SERPL-MCNC: 41 UG/DL (ref 45–160)
PROTHROMBIN TIME: 31.9 SEC (ref 9–12.5)
SATURATED IRON: 12 % (ref 20–50)
TOTAL IRON BINDING CAPACITY: 354 UG/DL (ref 250–450)
TRANSFERRIN SERPL-MCNC: 239 MG/DL (ref 200–375)
VIT B12 SERPL-MCNC: 412 PG/ML (ref 210–950)

## 2023-05-31 PROCEDURE — 93793 ANTICOAG MGMT PT WARFARIN: CPT | Mod: S$GLB,,,

## 2023-05-31 PROCEDURE — 93793 PR ANTICOAGULANT MGMT FOR PT TAKING WARFARIN: ICD-10-PCS | Mod: S$GLB,,,

## 2023-05-31 PROCEDURE — 84466 ASSAY OF TRANSFERRIN: CPT | Performed by: INTERNAL MEDICINE

## 2023-05-31 PROCEDURE — 82728 ASSAY OF FERRITIN: CPT | Performed by: INTERNAL MEDICINE

## 2023-05-31 PROCEDURE — 85610 PROTHROMBIN TIME: CPT | Performed by: INTERNAL MEDICINE

## 2023-05-31 PROCEDURE — 86364 TISS TRNSGLTMNASE EA IG CLAS: CPT | Performed by: INTERNAL MEDICINE

## 2023-05-31 PROCEDURE — 82607 VITAMIN B-12: CPT | Performed by: INTERNAL MEDICINE

## 2023-05-31 PROCEDURE — 82746 ASSAY OF FOLIC ACID SERUM: CPT | Performed by: INTERNAL MEDICINE

## 2023-05-31 PROCEDURE — 82784 ASSAY IGA/IGD/IGG/IGM EACH: CPT | Performed by: INTERNAL MEDICINE

## 2023-05-31 PROCEDURE — 85018 HEMOGLOBIN: CPT | Performed by: INTERNAL MEDICINE

## 2023-05-31 RX ORDER — FERROUS GLUCONATE 324(38)MG
324 TABLET ORAL
Qty: 90 TABLET | Refills: 1 | Status: SHIPPED | OUTPATIENT
Start: 2023-05-31 | End: 2023-07-02 | Stop reason: SDUPTHER

## 2023-05-31 RX ORDER — ALLOPURINOL 100 MG/1
100 TABLET ORAL DAILY
Qty: 90 TABLET | Refills: 3 | Status: SHIPPED | OUTPATIENT
Start: 2023-05-31

## 2023-05-31 NOTE — TELEPHONE ENCOUNTER
No care due was identified.  [facility] Embedded Care Due Messages. Reference number: [id number].   5/31/2023 1:10:08 PM CDT

## 2023-05-31 NOTE — TELEPHONE ENCOUNTER
----- Message from Billie Frye sent at 5/31/2023 12:08 PM CDT -----  Contact: Edilma mendieta/[facility]  [phone]  Edilma is calling in regards to a refill on pt's allopurinoL (ZYLOPRIM) 100 MG tablet 90 tablet. Please call.            Thank you

## 2023-05-31 NOTE — PROGRESS NOTES
INR not at goal. Medications, chart, and patient findings reviewed. See calendar for adjustments to dose and follow up plan. Pt remains supratherapeutic.  Recommend to decrease further & re-assess.

## 2023-05-31 NOTE — PROGRESS NOTES
GI MA team - please tell patient that they are iron deficient and anemic and recommend that they take ferrous gluconate one 324mg pill once daily for next 3 months.    GI MA team -  Please order repeat fasting Hemoglobin, Iron/TIBC, and Ferritin in 2 weeks - Orders placed.     Please tell Dariel recommend EGD and colonoscopy for further evaluation of his iron deficiency anemia.  Referral placed.

## 2023-06-01 ENCOUNTER — TELEPHONE (OUTPATIENT)
Dept: ENDOSCOPY | Facility: HOSPITAL | Age: 83
End: 2023-06-01
Payer: MEDICARE

## 2023-06-01 NOTE — TELEPHONE ENCOUNTER
----- Message from Sergei Ayon MD sent at 5/31/2023  2:39 PM CDT -----  GI MA team - please tell patient that they are iron deficient and anemic and recommend that they take ferrous gluconate one 324mg pill once daily for next 3 months.    GI MA team -  Please order repeat fasting Hemoglobin, Iron/TIBC, and Ferritin in 2 weeks - Orders placed.     Please tell Dariel recommend EGD and colonoscopy for further evaluation of his iron deficiency anemia.  Referral placed.

## 2023-06-01 NOTE — TELEPHONE ENCOUNTER
Patient has order for urgent EGD and Colonoscopy for iron deficiency anemia. Is patient clear to proceed with procedures? Please advise.  Thanks!

## 2023-06-02 ENCOUNTER — TELEPHONE (OUTPATIENT)
Dept: ENDOSCOPY | Facility: HOSPITAL | Age: 83
End: 2023-06-02
Payer: MEDICARE

## 2023-06-02 ENCOUNTER — TELEPHONE (OUTPATIENT)
Dept: CARDIOLOGY | Facility: CLINIC | Age: 83
End: 2023-06-02
Payer: MEDICARE

## 2023-06-02 NOTE — TELEPHONE ENCOUNTER
Patient has an order for an urgent EGD and Colonoscopy. Is this patient clear to proceed with procedures from a cardiac standpoint? Please advise. Thanks!

## 2023-06-06 ENCOUNTER — TELEPHONE (OUTPATIENT)
Dept: ENDOSCOPY | Facility: HOSPITAL | Age: 83
End: 2023-06-06
Payer: MEDICARE

## 2023-06-06 VITALS — HEIGHT: 64 IN | BODY MASS INDEX: 28 KG/M2 | WEIGHT: 164 LBS

## 2023-06-06 DIAGNOSIS — Z12.11 SCREEN FOR COLON CANCER: Primary | ICD-10-CM

## 2023-06-06 LAB — TTG IGA SER-ACNC: 0.3 U/ML

## 2023-06-06 NOTE — TELEPHONE ENCOUNTER
"Sergei Ayon MD  P [facility] Patients  Caller: Unspecified (6 days ago,  2:36 PM)  Procedure: EGD/Colonoscopy     Diagnosis: Iron deficiency anemia     Procedure Timin-4 weeks     *If within 4 weeks selected, please sherrell as high priority*     *If greater than 12 weeks, please select "5-12 weeks" and delay sending until 2 months prior to requested date*     Provider: Any GI provider     Location: [address]     Additional Scheduling Information: Blood thinners     Prep Specifications:Standard prep     Have you attached a patient to this message: yes     BLOOD THINNER HOLD / CARDIAC CLEARANCE    Patrick AvendanoD  You; Brant Devine MD; Radha Winston MA 1 hour ago (1:26 PM)       We have cleared this patient to hold coumadin 5 days for EGD/c-scope. Please notify Coumadin Clinic once scheduled.  We are planning to bridge with lovenox due to his hx of mechanical aortic valve replacement. Please let us know if you have any questions or concerns otherwise we will proceed as planned.     Respectfully,   Laurie Patino, PharmD, [facility]   Coumadin Clinic    [phone]           Radha Winston MA routed conversation to You 5 hours ago (9:57 AM)      Domonique Rosales PharmD routed conversation to Laurie Patino PharmD 6 hours ago (8:08 AM)      MD Radha Bonner MA; Domonique Rosales PharmD 21 hours ago (5:35 PM)           He can proceed with endoscope     Coumadin clinic will help with anticoagulation     Thanks     YAHAIRA Villareal MD 4 days ago     NW  Dear Chika,     Pt needs cardiac Clearance Please.   Pt last of Visit with you was  2023.       You routed conversation to Brant Devine MD; Nilson Grande 4 days ago     You 4 days ago     LW  Patient has an order for an urgent EGD and Colonoscopy. Is this patient clear to proceed with procedures from a cardiac standpoint? Please advise. Thanks!         Note          Colonoscopy " Procedure Prep Instructions    Date of procedure: 6/16/23Arrive at: 9:00 AM    Location of Department:   [facility] [address] [place] [address]    As soon as possible:   your prep from pharmacy and over the counter DULCOLAX LAXATIVE TABLETS            On the day before your procedure   What You CAN do:   You may have clear liquids ONLY-see below for list.     Liquids That Are OK to Drink:   Water  Sports drinks (Gatorade, Power-Aid)  Coffee or tea (no cream or nondairy creamer)  Clear juices without pulp (apple, white grape)  Gelatin desserts (no fruit or toppings)  Clear soda (sprite, coke, ginger ale)  Chicken broth (until 12 midnight the night before procedure)    What You CANNOT do:   Do not EAT solid food, drink milk or anything   colored red.  Do not drink alcohol.  Do not take oral medications within 1 hour of starting   each dose of prep.  No gum chewing or candy morning of procedure                       Note:   (Please disregard the insert instructions from pharmacy).  PEG Bowel Prep is indicated for cleansing of the colon as a preparation for colonoscopy in adults.   Be sure to tell your doctor about all the medicines you take, including prescription and non-prescription medicines, vitamins, and herbal supplements. PEG Bowel Prep may affect how other medicines work.  Medication taken by mouth may not be absorbed properly when taken within 1 hour before the start of each dose of PEG Bowel Prep.    It is not uncommon to experience some abdominal cramping, nausea and/or vomiting when taking the prep. If you have nausea and/or vomiting while taking the prep, stop drinking for 20 to 30 minutes then continue.      How to take prep:    PEG Bowel Prep is a (2-day) prep.    One (1) bottle of prep are required for a complete preparation for colonoscopy. Dilute the solution concentrate as directed prior to use. You must drink water with each dose of prep, and additional water after  each dose.    DOSE 1--Day Before Colonoscopy 6/15/23     Drink at least 6 to 8 glasses of clear liquids from time you wake up until you begin your prep and then continue until bedtime to avoid dehydration.     12:00 pm (NOON) Mix your entire container of prep with lukewarm water and refrigerate. Take four (4) Dulcolax (Bisacodyl) tablets with at least 8 ounces or more of clear liquids.       6:00 pm:    You must complete Steps 1 and 2 below before going to bed:    Step 1-Drink half the liquid in the container within one (1) hour.   Step 2-Refrigerate the remaining half of the liquid for dose 2. See below when to begin this step.                       IMPORTANT: If you experience preparation-related symptoms (for example, nausea, bloating, or cramping), stop, or slow the rate of drinking the additional water until your symptoms decrease.    DOSE 2--Day of the Colonoscopy 6/16/23 at 2-3 AM.    For this dose, repeat Step 1 shown above using the remaining half of the liquid prep.   You may continue drinking water/clear liquids until   4 hours before your colonoscopy or as directed by the scheduling nurse  6:00 AM.    For more information about your procedure, please watch this informational video.     Options for viewing:  Using a keyboard:  press and hold the control tab (Ctrl) and left mouse click to follow link          Colonoscopy Instructional Video                                                        OR    Type link address into your web browser's address bar:  [email or web address]    Using a mobile phone: tap on web address/link.     Comments:         IMPORTANT INFORMATION TO KNOW BEFORE YOUR PROCEDURE    [facility] 2nd Floor    If your procedure requires the administration of anesthesia, it is necessary for a responsible adult to drive you home. (Medical Transportation, Uber, Lyft, Taxi, etc. may ONLY be used if a responsible adult is present to accompany you home.   The responsible adult CAN'T be the  of the service).      person must be available to return to pick you up within 30 minutes of being notified of discharge.     Due to the limited socially distant seating in our waiting room, please limit your guest (1) who accompany you for this procedure. If someone accompanies you for this procedure into the facility:    Consider having them proceed to an area that is socially distant other than the lobby until a member of the medical team contacts them to provide an update after the procedure.     Also, please consider being dropped off and picked up from the facility.      Please bring a picture ID, insurance card, & copayment    Take Medications as directed below:        If you begin taking any blood thinning medications, please contact the  listed below as soon as possible.     If you are diabetic see the attached instruction sheet regarding your medication.     If you take HEART, BLOOD PRESSURE, SEIZURE, PAIN, LUNG (including inhalers/nebulizers), ANTI-REJECTION (transplant patients), or PSYCHIATRIC medications, please take at your regular times with a sip of water or as directed by the scheduling nurse.     Important contact information:    Endoscopy [phone] Hours of operation Monday-Friday 8:00-4:30pm.    Questions about insurance or financial obligations call [phone] or [phone].    If you have questions regarding the prep or need to reschedule, please call [phone]. After hours questions requiring immediate assistance, contact [facility] nurse line at [phone] or [phone].   NOTE:     On occasion, unforeseen circumstances may cause a delay in your procedure start time. We respect your time and appreciate your patience during these circumstances.      Comments:       Are you ready for your Colonoscopy?      __ If you take blood thinners, have you stopped taking them according to your doctor's  instructions before your procedures?  __ Have you stopped eating solid foods and followed a clear liquid diet a full day before your procedure or followed the diet       guidelines indicated in your instructions? REMINDER: NO BROTH AFTER MIDNIGHT THE DAY BEFORE PROCEDURE.   __ Have you completed all your prep solution? PLEASE DO NOT FOLLOW the insert/or box instructions from pharmacy)  __ Have you taken your blood pressure, heart, seizure, or other essential medications the morning of your procedure?  __ Have you planned for a ride to and from procedure?  (Medical Transportation, Uber, Lyft, Taxi, etc. may ONLY be used if a responsible adult is present to accompany you home). The responsible adult CANNOT be the  of the service.       Questions or Concerns?  Please call us!  [phone] (M-F) [phone] (Nights and weekends)    Listed below are some helpful tips:    Please bring protective cases for eyewear and hearing aids-wear comfortable clothing/shoes.  Follow prep instructions closely so you don't have to do it twice.  Bowel prep is prescription used to clean out the colon before a colonoscopy. The prep increases movement of your colon by causing you to have diarrhea (loose stools). Cleaning out stool from the colon helps your doctor to see in your colon clearly during this procedure.   It is important to stay hydrated before, during and after bowel prep to prevent loss of fluid (dehydration). You can have water and your choice of clear liquids. Reminder: these liquids you will drink in addition to the bowel prep.     Preparing the mixture:    First, mix the prep with water.  Make the taste better by adding a sugar free drink mix (Crystal Light) can improve the taste of your prep.   Use a large bore (opening) straw. Place towards the back of mouth (throat) as tolerated.  Prepare a prep mixture that is lightly chilled, but not ice-cold. Drinking a large amount of ice-cold liquid can make you feel very  ill.  Avoid drinking any RED beverages or eating popsicles with this color for the 24 hours leading up to your procedure. This color can look like blood in the colon.    Consuming the prep:    Take your time. If you feel ill, take a 15-minute break from drinking the prep mixture  Combat hunger and dehydration with clear liquids. Options like JELL-O, popsicles, or chicken broth will help.  Settle in with good reading material. The goal is to clean out 6 feet of colon, so you can plan on spending a good deal of time in the bathroom. Have some good reading material on-hand or an iPad ready to keep yourself entertained!  Keep yourself comfortable. We recommend applying personal hygiene wipes, Tucks pads and a soothing ointment, like A&D ointment, Desitin, or Vaseline to your bottom before starting and as needed to protect your skin.

## 2023-06-08 ENCOUNTER — LAB VISIT (OUTPATIENT)
Dept: LAB | Facility: HOSPITAL | Age: 83
End: 2023-06-08
Attending: INTERNAL MEDICINE
Payer: MEDICARE

## 2023-06-08 ENCOUNTER — ANTI-COAG VISIT (OUTPATIENT)
Dept: CARDIOLOGY | Facility: CLINIC | Age: 83
End: 2023-06-08
Payer: MEDICARE

## 2023-06-08 DIAGNOSIS — Z79.01 ANTICOAGULANT LONG-TERM USE: Primary | ICD-10-CM

## 2023-06-08 DIAGNOSIS — Z79.01 ANTICOAGULANT LONG-TERM USE: ICD-10-CM

## 2023-06-08 DIAGNOSIS — Z95.2 H/O MECHANICAL AORTIC VALVE REPLACEMENT: ICD-10-CM

## 2023-06-08 LAB
INR PPP: 2 (ref 0.8–1.2)
PROTHROMBIN TIME: 20.3 SEC (ref 9–12.5)

## 2023-06-08 PROCEDURE — 85610 PROTHROMBIN TIME: CPT | Performed by: INTERNAL MEDICINE

## 2023-06-08 PROCEDURE — 93793 PR ANTICOAGULANT MGMT FOR PT TAKING WARFARIN: ICD-10-PCS | Mod: S$GLB,,,

## 2023-06-08 PROCEDURE — 36415 COLL VENOUS BLD VENIPUNCTURE: CPT | Performed by: INTERNAL MEDICINE

## 2023-06-08 PROCEDURE — 93793 ANTICOAG MGMT PT WARFARIN: CPT | Mod: S$GLB,,,

## 2023-06-08 RX ORDER — ENOXAPARIN SODIUM 100 MG/ML
70 INJECTION SUBCUTANEOUS DAILY
Qty: 8 ML | Refills: 1 | Status: SHIPPED | OUTPATIENT
Start: 2023-06-08 | End: 2023-10-08

## 2023-06-08 RX ORDER — ENOXAPARIN SODIUM 100 MG/ML
100 INJECTION SUBCUTANEOUS DAILY
Qty: 10 ML | Refills: 1 | Status: SHIPPED | OUTPATIENT
Start: 2023-06-08 | End: 2023-06-08 | Stop reason: DRUGHIGH

## 2023-06-08 NOTE — PROGRESS NOTES
INR at goal. Medications and chart reviewed. No changes noted to necessitate adjustment of warfarin or follow-up plan. See calendar.  Pt has upcoming c-scope/EGD.    Patient lovenox bridging instructions    Height    64in            Weight    74.4kg          SCr    2.3            CrCl   ~23ml/min               Pre-Procedure Instructions:    Take last dose of warfarin on :        6/10/23                         Start enoxaparin injections the morning of:        6/11/23                         Enoxaparin dose is:         70mg               Once daily in the morning  Last dose of enoxaparin will be the morning of:        6/15/23                         Post-Procedure Instructions:    Restart warfarin dose on       6/17/23                      Unless directed otherwise by your physician  Restart lovenox injections on the morning of      6/17/23                     Unless directed otherwise by your physician  Call the coumadin clinic your physician has different recommendations post procedure

## 2023-06-09 ENCOUNTER — PATIENT MESSAGE (OUTPATIENT)
Dept: CARDIOLOGY | Facility: CLINIC | Age: 83
End: 2023-06-09
Payer: MEDICARE

## 2023-06-09 NOTE — PROGRESS NOTES
Mrs. Urbina advised on warfarin per calendar and Lovenox per pharmacist's note. She understands that the patient's Lovenox prescription should be picked up from the [facility] pharmacy. Mrs. Urbina also advised to contact the Coumadin Clinic if given other recommendations by the procedural MD. She expressed understanding of these instructions via verbal teach back. INR scheduled for 06.20.23 at [facility].

## 2023-06-12 ENCOUNTER — OFFICE VISIT (OUTPATIENT)
Dept: OTOLARYNGOLOGY | Facility: CLINIC | Age: 83
End: 2023-06-12
Payer: MEDICARE

## 2023-06-12 ENCOUNTER — TELEPHONE (OUTPATIENT)
Dept: ENDOSCOPY | Facility: HOSPITAL | Age: 83
End: 2023-06-12

## 2023-06-12 VITALS
BODY MASS INDEX: 29.55 KG/M2 | SYSTOLIC BLOOD PRESSURE: 152 MMHG | HEART RATE: 75 BPM | WEIGHT: 172.19 LBS | DIASTOLIC BLOOD PRESSURE: 69 MMHG

## 2023-06-12 DIAGNOSIS — R04.0 RECURRENT EPISTAXIS: Primary | ICD-10-CM

## 2023-06-12 PROCEDURE — 1126F AMNT PAIN NOTED NONE PRSNT: CPT | Mod: CPTII,S$GLB,, | Performed by: STUDENT IN AN ORGANIZED HEALTH CARE EDUCATION/TRAINING PROGRAM

## 2023-06-12 PROCEDURE — 99215 OFFICE O/P EST HI 40 MIN: CPT | Mod: 25,S$GLB,, | Performed by: STUDENT IN AN ORGANIZED HEALTH CARE EDUCATION/TRAINING PROGRAM

## 2023-06-12 PROCEDURE — 1101F PR PT FALLS ASSESS DOC 0-1 FALLS W/OUT INJ PAST YR: ICD-10-PCS | Mod: CPTII,S$GLB,, | Performed by: STUDENT IN AN ORGANIZED HEALTH CARE EDUCATION/TRAINING PROGRAM

## 2023-06-12 PROCEDURE — 3078F PR MOST RECENT DIASTOLIC BLOOD PRESSURE < 80 MM HG: ICD-10-PCS | Mod: CPTII,S$GLB,, | Performed by: STUDENT IN AN ORGANIZED HEALTH CARE EDUCATION/TRAINING PROGRAM

## 2023-06-12 PROCEDURE — 3077F SYST BP >= 140 MM HG: CPT | Mod: CPTII,S$GLB,, | Performed by: STUDENT IN AN ORGANIZED HEALTH CARE EDUCATION/TRAINING PROGRAM

## 2023-06-12 PROCEDURE — 3072F LOW RISK FOR RETINOPATHY: CPT | Mod: CPTII,S$GLB,, | Performed by: STUDENT IN AN ORGANIZED HEALTH CARE EDUCATION/TRAINING PROGRAM

## 2023-06-12 PROCEDURE — 3077F PR MOST RECENT SYSTOLIC BLOOD PRESSURE >= 140 MM HG: ICD-10-PCS | Mod: CPTII,S$GLB,, | Performed by: STUDENT IN AN ORGANIZED HEALTH CARE EDUCATION/TRAINING PROGRAM

## 2023-06-12 PROCEDURE — 99999 PR PBB SHADOW E&M-EST. PATIENT-LVL IV: ICD-10-PCS | Mod: PBBFAC,,, | Performed by: STUDENT IN AN ORGANIZED HEALTH CARE EDUCATION/TRAINING PROGRAM

## 2023-06-12 PROCEDURE — 99215 PR OFFICE/OUTPT VISIT, EST, LEVL V, 40-54 MIN: ICD-10-PCS | Mod: 25,S$GLB,, | Performed by: STUDENT IN AN ORGANIZED HEALTH CARE EDUCATION/TRAINING PROGRAM

## 2023-06-12 PROCEDURE — 3072F PR LOW RISK FOR RETINOPATHY: ICD-10-PCS | Mod: CPTII,S$GLB,, | Performed by: STUDENT IN AN ORGANIZED HEALTH CARE EDUCATION/TRAINING PROGRAM

## 2023-06-12 PROCEDURE — 3288F FALL RISK ASSESSMENT DOCD: CPT | Mod: CPTII,S$GLB,, | Performed by: STUDENT IN AN ORGANIZED HEALTH CARE EDUCATION/TRAINING PROGRAM

## 2023-06-12 PROCEDURE — 1126F PR PAIN SEVERITY QUANTIFIED, NO PAIN PRESENT: ICD-10-PCS | Mod: CPTII,S$GLB,, | Performed by: STUDENT IN AN ORGANIZED HEALTH CARE EDUCATION/TRAINING PROGRAM

## 2023-06-12 PROCEDURE — 1160F RVW MEDS BY RX/DR IN RCRD: CPT | Mod: CPTII,S$GLB,, | Performed by: STUDENT IN AN ORGANIZED HEALTH CARE EDUCATION/TRAINING PROGRAM

## 2023-06-12 PROCEDURE — 1159F PR MEDICATION LIST DOCUMENTED IN MEDICAL RECORD: ICD-10-PCS | Mod: CPTII,S$GLB,, | Performed by: STUDENT IN AN ORGANIZED HEALTH CARE EDUCATION/TRAINING PROGRAM

## 2023-06-12 PROCEDURE — 1101F PT FALLS ASSESS-DOCD LE1/YR: CPT | Mod: CPTII,S$GLB,, | Performed by: STUDENT IN AN ORGANIZED HEALTH CARE EDUCATION/TRAINING PROGRAM

## 2023-06-12 PROCEDURE — 3288F PR FALLS RISK ASSESSMENT DOCUMENTED: ICD-10-PCS | Mod: CPTII,S$GLB,, | Performed by: STUDENT IN AN ORGANIZED HEALTH CARE EDUCATION/TRAINING PROGRAM

## 2023-06-12 PROCEDURE — 1160F PR REVIEW ALL MEDS BY PRESCRIBER/CLIN PHARMACIST DOCUMENTED: ICD-10-PCS | Mod: CPTII,S$GLB,, | Performed by: STUDENT IN AN ORGANIZED HEALTH CARE EDUCATION/TRAINING PROGRAM

## 2023-06-12 PROCEDURE — 99999 PR PBB SHADOW E&M-EST. PATIENT-LVL IV: CPT | Mod: PBBFAC,,, | Performed by: STUDENT IN AN ORGANIZED HEALTH CARE EDUCATION/TRAINING PROGRAM

## 2023-06-12 PROCEDURE — 1159F MED LIST DOCD IN RCRD: CPT | Mod: CPTII,S$GLB,, | Performed by: STUDENT IN AN ORGANIZED HEALTH CARE EDUCATION/TRAINING PROGRAM

## 2023-06-12 PROCEDURE — 31238 PR NASAL/SINUS ENDOSCOPY,W/CONTROL NASAL HEM: ICD-10-PCS | Mod: LT,S$GLB,, | Performed by: STUDENT IN AN ORGANIZED HEALTH CARE EDUCATION/TRAINING PROGRAM

## 2023-06-12 PROCEDURE — 31238 NSL/SINS NDSC SRG NSL HEMRRG: CPT | Mod: LT,S$GLB,, | Performed by: STUDENT IN AN ORGANIZED HEALTH CARE EDUCATION/TRAINING PROGRAM

## 2023-06-12 PROCEDURE — 3078F DIAST BP <80 MM HG: CPT | Mod: CPTII,S$GLB,, | Performed by: STUDENT IN AN ORGANIZED HEALTH CARE EDUCATION/TRAINING PROGRAM

## 2023-06-12 NOTE — PROGRESS NOTES
Mrs. Urbina advised on warfarin per calendar and Lovenox per pharmacist's note. Patient also advised that she should contact the Coumadin Clinic if given other recommendations by the procedural MD. Mrs. Urbina expressed understanding of these instructions via verbal teach back. INR scheduled for 06.19.23 at [facility].

## 2023-06-12 NOTE — TELEPHONE ENCOUNTER
----- Message from David Manning sent at 6/12/2023  2:43 PM CDT -----  Type:  cxl procedure    Who Called:Pt  Does the patient know what this is regarding?:cxl procedure due to emergency procedure on Wednesday   Would the patient rather a call back or a response via [facility]? Call   Best Call Back Number:[phone]  Additional Information:

## 2023-06-12 NOTE — PROGRESS NOTES
Subjective:      Dariel is a 82 y.o. male who comes for follow-up of  epistaxis .  His last visit with me was on 5/9/2023. Still having continued left sided epistaxis. Patient reports about 2-3 days of bleeding control after cautery then bleeding recurs. CBC drawn at last visit did not show significant drop in H/H. Most recent Hgb at 8.6 from 9.4.    His current sinus regime consists of: Afrin PRN for bleeding.     The patient's medications, allergies, past medical, surgical, social and family histories were reviewed and updated as appropriate.    A detailed review of systems was obtained with pertinent positives as per the above HPI, and otherwise negative.        Objective:     BP (!) 152/69 (BP Location: Right arm, Patient Position: Sitting)   Pulse 75   Wt 78.1 kg (172 lb 2.9 oz)   BMI 29.55 kg/m²        Constitutional:   Vital signs are normal. He appears well-developed and well-nourished.     Head:  Normocephalic and atraumatic.     Ears:  Hearing normal to normal and whispered voice; external ear normal without scars, lesions, or masses; ear canal, tympanic membrane, and middle ear normal..   Right Ear: No swelling. Tympanic membrane is not perforated and not bulging. No middle ear effusion.   Left Ear: No swelling. Tympanic membrane is not perforated and not bulging.  No middle ear effusion.     Nose:  Nose normal including turbinates, nasal mucosa, sinuses and nasal septum. No epistaxis.     Mouth/Throat  Oropharynx clear and moist without lesions or asymmetry and normal uvula midline. Normal dentition. No tonsillar abscesses. Tonsillar exudate.      Neck:  Neck normal without thyromegaly masses, asymmetry, normal tracheal structure, crepitus, and tenderness, thyroid normal, trachea normal, phonation normal, full range of motion with neck supple and no adenopathy. No stridor present.        Head (right side): No submental adenopathy present.        Head (left side): No submental adenopathy present.      He has no cervical adenopathy.     Pulmonary/Chest:   No stridor.     Procedure    Nasal Endoscopy with Repeat Control of Epistaxis    After written consent was obtained the nose was anesthetized with topical pontocaine and phenylephrine pledgets.   silver nitrate was used to cauterize the prominent vascularity on the left anterior septum.  The patient tolerated the procedure well and there were no complications.  Hemostasis was obtained.  Bacitracin ointment was placed after cauterization.    Data Reviewed    WBC (K/uL)   Date Value   05/09/2023 6.04     Eosinophil % (%)   Date Value   05/09/2023 1.8     Eos # (K/uL)   Date Value   05/09/2023 0.1     Platelets (K/uL)   Date Value   05/09/2023 173     Glucose (mg/dL)   Date Value   04/18/2023 100     No results found for: IGE    No sinus imaging available.    Assessment:     1. Recurrent epistaxis      Plan:     - He would benefit from bilateral SPA ligation for the treatment of his condition.  I discussed the risks, benefits and alternatives to surgery with the patient, as well as the expected postoperative course.  I gave him the opportunity to ask questions and I answered all of them.  I provided relevant printed information on his condition for him to review at home.  Same-day discharge is anticipated.  He may have anesthesia triage by telephone.   The surgery will be tentatively scheduled for 6/14/23.  He will return for a postoperative visit 1 week after surgery.  - The risks and benefits of endoscopic surgery were discussed with the patient in detail, which include but are not limited to pain, bleeding, infection, the need for reoperation, injury to orbit or orbital contents, injury to brain or meninges, and unforeseeable complications of surgery including myocardial infarction, stroke or pulmonary embolus.  - Patient is currently bridging from coumadin to Lovenox for his scheduled colonoscopy later this week.   - With continued large volume epistaxis  would plan to expedite SPA ligation  - Will draw cbc morning of surgery.     Jono Russell MD

## 2023-06-12 NOTE — PROGRESS NOTES
Updated procedural instructions (FESS procedure):     Pre-Procedure Instructions:     Continue lovenox  Enoxaparin dose is:         70mg               Once daily in the morning  Last dose of enoxaparin will be the morning of:        6/13/23                          Post-Procedure Instructions:     Restart warfarin dose on       6/15/23                      Unless directed otherwise by your physician  Restart lovenox injections on the morning of      6/15/23                     Unless directed otherwise by your physician  Call the coumadin clinic your physician has different recommendations post procedure

## 2023-06-12 NOTE — PROGRESS NOTES
Pts wife called in because pt was supposed to have colonoscopy. Has been doing Lovenox bridging instructions as instructed to do but now colonoscopy will need to be rescheduled and pt having nose surgery on Wednesday 6/14. ENT would like pt to stay on the bridge but pt's wife wants to make sure it's still the same  doses for a new procedure.

## 2023-06-13 ENCOUNTER — ANESTHESIA EVENT (OUTPATIENT)
Dept: SURGERY | Facility: HOSPITAL | Age: 83
End: 2023-06-13
Payer: MEDICARE

## 2023-06-13 NOTE — ANESTHESIA PREPROCEDURE EVALUATION
[facility]  Anesthesia Pre-Operative Evaluation         Patient Name: Dariel Urbina  YOB: 1940  MRN: [id number]    SUBJECTIVE:     Pre-operative evaluation for Procedure(s) (LRB):  FESS (FUNCTIONAL ENDOSCOPIC SINUS SURGERY) (Bilateral)  LIGATION, ARTERY, SPHENOPALATINE, ENDOSCOPIC (Bilateral)     06/13/2023    Dariel Urbina is a 82 y.o. male w/ a significant PMHx of HTN, DM2, CKD, CAD, CHF, h/o mechanical aortic valve, pAF, gout, and mitral insufficiency who presents for the above procedure. Bilateral  SPA ligation.    KIRSTEN 4/12/23   The left ventricle is normal in size with normal systolic function.The estimated ejection fraction is 60%.   Normal right ventricular size with normal right ventricular systolic function.   Moderate mitral regurgitation.   There is a mechanical aortic valve present. There is no aortic insufficiency present.   Plaque present in the transverse aorta.      C 3/2/23    The Ost LAD lesion was 50% stenosed.    The Ost Cx to Mid Cx lesion was 100% stenosed.    The Mid LAD lesion was 100% stenosed.     PET Stress 1/17/23    There is a very small (< 5%) sized, moderate intensity basal to distal inferior and inferolateral resting perfusion abnormality involving 3% of LV myocardium in the distribution of the LCX and RCA. After pharmacologic stress, this perfusion abnormality is larger and more severe involving 16% of the LV myocardium, indicative of ischemia with underlying scar.    Within the perfusion abnormality, absolute myocardial perfusion (cc/min/gm) averaged 0.61 cc/min/g at rest, 0.58 cc/min/g at stress and CFR was 0.95 , which equates to severely reduced coronary flow capacity within the LCX and RCA territory.    There are no other significant perfusion abnormalities.    The whole heart absolute myocardial perfusion values averaged 0.89 cc/min/g at rest, which is normal; 1.10 cc/min/g at stress, which  is mildly reduced; and CFR is 1.23 , which is severely reduced.    CT attenuation images demonstrate severe diffuse coronary calcifications in the LAD, LCX, moderate calcification in the RCA territory and severe diffuse aortic calcifications in the ascending aorta and descending aorta.    The gated perfusion images showed an ejection fraction of 46% at rest and 46% during stress. A normal ejection fraction is greater than 47%.    There is mild global hypokinesis at rest and during stress.    The LV cavity size is normal at rest and stress.    The ECG portion of the study is uninterpretable due to left bundle branch block.    During stress, rare PVCs are noted.    The patient reported chest pain during the stress test.    When compared to the previous study from 11/3/2021, the reversible inferolateral perfusion defect is still present.     Echo 12/29/22  · The left ventricle is normal in size with mild concentric hypertrophy and mildly decreased systolic function.  · The estimated ejection fraction is 48%.  · There are segmental left ventricular wall motion abnormalities.  · There is abnormal septal wall motion.  · Grade III left ventricular diastolic dysfunction.  · Severe left atrial enlargement.  · Normal right ventricular size with normal right ventricular systolic function.  · Mild right atrial enlargement.  · There is a mechanical aortic valve present.  · The aortic valve mean gradient is 17 mmHg with a dimensionless index of 0.38.  · Moderate to moderate -severe ( 2-3+) MR  · Moderate tricuspid regurgitation.  · Normal central venous pressure (3 mmHg).  · The estimated PA systolic pressure is 62 mmHg.  · There is at least moderate pulmonary hypertension.    LDA: None documented.    Prev airway: None documented.     Drips: None documented.    Patient Active Problem List   Diagnosis    Atherosclerosis of lower extremity with claudication    Anticoagulant long-term use    Hyperlipidemia associated with  type 2 diabetes mellitus    History of colon resection    Renovascular hypertension    History of gout    H/O mechanical aortic valve replacement    Atherosclerosis of native artery of extremity with intermittent claudication    Stage 3b chronic kidney disease    Type 2 diabetes mellitus with diabetic peripheral angiopathy without gangrene    Bilateral carotid artery disease    Diarrhea    Pulmonary heart disease    Acute renal failure superimposed on stage 3 chronic kidney disease    Metabolic acidosis with normal anion gap and bicarbonate losses    Acute anterior epistaxis    Gastrointestinal hemorrhage associated with intestinal diverticulosis    Hx of colonic polyp    Upper extremity weakness    Hypertension associated with diabetes    Bilateral carpal tunnel syndrome    Numbness and tingling in both hands    Severe carpal tunnel syndrome of right wrist    Atherosclerosis of native coronary artery of native heart without angina pectoris    Weakness    Anemia    Chronic kidney disease (CKD), stage IV (severe)    Syncope    Epistaxis    Supratherapeutic INR    Coronary artery disease involving native coronary artery of native heart without angina pectoris    Acute decompensated heart failure    Chronic combined systolic and diastolic heart failure    Paroxysmal atrial fibrillation    Mitral insufficiency       Review of patient's allergies indicates:   Allergen Reactions    Fosinopril      Intolerance- elevates potassium level      Losartan      Intolerance- elevates potassium level       Current Inpatient Medications:      No current facility-administered medications on file prior to encounter.     Current Outpatient Medications on File Prior to Encounter   Medication Sig Dispense Refill    enoxaparin (LOVENOX) 80 mg/0.8 mL Syrg Inject 0.7 mLs (70 mg total) into the skin Daily. 8 mL 1    albuterol (VENTOLIN HFA) 90 mcg/actuation inhaler Inhale 2 puffs into the lungs every 6  (six) hours as needed for Wheezing. Rescue 18 g 1    allopurinoL (ZYLOPRIM) 100 MG tablet Take 1 tablet (100 mg total) by mouth once daily. 90 tablet 3    bumetanide (BUMEX) 1 MG tablet Take 1 tablet (1 mg total) by mouth every other day. 15 tablet 11    ferrous gluconate (FERGON) 324 MG tablet Take 1 tablet (324 mg total) by mouth daily with breakfast. 90 tablet 1    isosorbide mononitrate (IMDUR) 60 MG 24 hr tablet Take 60 mg by mouth every evening.      loperamide (IMODIUM) 2 mg capsule Take 2 mg by mouth 4 (four) times daily as needed for Diarrhea.      metoprolol succinate (TOPROL-XL) 25 MG 24 hr tablet Take 1 tablet (25 mg total) by mouth once daily. 90 tablet 3    omega-3 fatty acids/fish oil (FISH OIL-OMEGA-3 FATTY ACIDS) 300-1,000 mg capsule Take 1 capsule by mouth once daily.      oxymetazoline (AFRIN) 0.05 % nasal spray Use 2 sprays by Nasal route daily as needed (nose bleed). 30 mL 0    rosuvastatin (CRESTOR) 40 MG Tab TAKE 1 TABLET EVERY EVENING. 90 tablet 3    warfarin (COUMADIN) 5 MG tablet warfarin 7.5 (1.5 tablets) sun and Thursday, and 5mg other days. warfarin 7.5 (1.5 tablets) sun and Thursday, and 5mg other days 30 tablet 11       Past Surgical History:   Procedure Laterality Date    BACK SURGERY      CARDIAC CATHETERIZATION      CARDIAC VALVE REPLACEMENT      CARDIAC VALVE SURGERY      CARPAL TUNNEL RELEASE Right 05/19/2020    Procedure: RELEASE, CARPAL TUNNEL;  Surgeon: Rupesh Norris Jr., MD;  Location: [facility];  Service: Plastics;  Laterality: Right;    CATARACT EXTRACTION Left 11/13/2022        COLON SURGERY      COLONOSCOPY N/A 03/31/2017    Procedure: COLONOSCOPY;  Surgeon: Bruno Raymond MD;  Location: [facility] (4TH FLR);  Service: Endoscopy;  Laterality: N/A;  Patient's wife requesting date.    COLONOSCOPY N/A 04/03/2018    Procedure: COLONOSCOPY;  Surgeon: Bonifacio Pelletier MD;  Location: [facility] ENDO (2ND FLR);  Service: Endoscopy;  Laterality: N/A;     COLONOSCOPY N/A 08/13/2018    Procedure: COLONOSCOPY;  Surgeon: Kam Barba MD;  Location: [facility] ENDO (2ND FLR);  Service: Endoscopy;  Laterality: N/A;  2nd floor: PA pressure 49; hx of moderate-severe valve disease     per Coumadin clinic-Patient can hold 5 days with lovenox bridge       ok to schedule per Katarina    CORONARY ANGIOGRAPHY N/A 10/04/2021    Procedure: Left heart cath +/- peripheral angiogram;  Surgeon: Jsoe Ruiz MD;  Location: [facility] CATH LAB;  Service: Cardiology;  Laterality: N/A;    CORONARY ANGIOGRAPHY N/A 3/2/2023    Procedure: Angiogram, Coronary;  Surgeon: Devon Garnica MD;  Location: [facility] CATH LAB;  Service: Cardiology;  Laterality: N/A;    CORONARY ANGIOPLASTY      CORONARY ARTERY BYPASS GRAFT      CORONARY BYPASS GRAFT ANGIOGRAPHY  10/04/2021    Procedure: Bypass graft study;  Surgeon: Jose Ruiz MD;  Location: [facility] CATH LAB;  Service: Cardiology;;    CORONARY BYPASS GRAFT ANGIOGRAPHY  3/2/2023    Procedure: Bypass graft study;  Surgeon: Devon Garnica MD;  Location: [facility] CATH LAB;  Service: Cardiology;;    ECHOCARDIOGRAM,TRANSESOPHAGEAL N/A 4/14/2023    Procedure: Transesophageal echo (KIRSTEN) intra-procedure log documentation;  Surgeon: [facility] Diagnostic Provider;  Location: [facility] EP LAB;  Service: Cardiology;  Laterality: N/A;    EYE SURGERY      HERNIA REPAIR      INTRAOCULAR PROSTHESES INSERTION Left 11/13/2022    Procedure: INSERTION, IOL PROSTHESIS;  Surgeon: Alia Mckeon MD;  Location: [address];  Service: Ophthalmology;  Laterality: Left;    INTRAOCULAR PROSTHESES INSERTION Right 12/04/2022    Procedure: INSERTION, IOL PROSTHESIS;  Surgeon: Alia Mckeon MD;  Location: [address];  Service: Ophthalmology;  Laterality: Right;    PHACOEMULSIFICATION OF CATARACT Left 11/13/2022    Procedure: PHACOEMULSIFICATION, CATARACT;  Surgeon: Alia Mckeon MD;  Location: [facility] OR [address] [place] [address];  Service: Ophthalmology;  Laterality: Left;     PHACOEMULSIFICATION OF CATARACT Right 2022    Procedure: PHACOEMULSIFICATION, CATARACT;  Surgeon: Alia Mckeon MD;  Location: [address];  Service: Ophthalmology;  Laterality: Right;    SPINE SURGERY      TRANSESOPHAGEAL ECHOCARDIOGRAPHY N/A 3/22/2023    Procedure: ECHOCARDIOGRAM, TRANSESOPHAGEAL;  Surgeon: [facility] Diagnostic Provider;  Location: [facility] EP LAB;  Service: Anesthesiology;  Laterality: N/A;    TRANSESOPHAGEAL ECHOCARDIOGRAPHY N/A 2023    Procedure: ECHOCARDIOGRAM, TRANSESOPHAGEAL;  Surgeon: [facility] Diagnostic Provider;  Location: [facility] EP LAB;  Service: Anesthesiology;  Laterality: N/A;    VASECTOMY         Social History     Socioeconomic History    Marital status:      Spouse name: Ameena    Number of children: 3   Occupational History    Occupation: retired   Tobacco Use    Smoking status: Former     Packs/day: 1.00     Years: 20.00     Pack years: 20.00     Types: Cigarettes     Quit date: 1980     Years since quittin.7     Passive exposure: Never    Smokeless tobacco: Never   Substance and Sexual Activity    Alcohol use: No    Drug use: No    Sexual activity: Not Currently     Partners: Female     Social Determinants of Health     Financial Resource Strain: Low Risk     Difficulty of Paying Living Expenses: Not hard at all   Food Insecurity: No Food Insecurity    Worried About Running Out of Food in the Last Year: Never true    Ran Out of Food in the Last Year: Never true   Transportation Needs: No Transportation Needs    Lack of Transportation (Medical): No    Lack of Transportation (Non-Medical): No   Physical Activity: Inactive    Days of Exercise per Week: 0 days    Minutes of Exercise per Session: 0 min   Stress: No Stress Concern Present    Feeling of Stress : Not at all   Social Connections: Moderately Isolated    Frequency of Communication with Friends and Family: Once a week    Frequency of Social Gatherings with Friends and Family: More  than three times a week    Attends Confucianism Services: Never    Active Member of Clubs or Organizations: No    Attends Club or Organization Meetings: Never    Marital Status:    Housing Stability: Low Risk     Unable to Pay for Housing in the Last Year: No    Number of Places Lived in the Last Year: 1    Unstable Housing in the Last Year: No       OBJECTIVE:     Vital Signs Range (Last 24H):         CBC:   No results for input(s): WBC, RBC, HGB, HCT, PLT, MCV, MCH, MCHC in the last 72 hours.    CMP: No results for input(s): NA, K, CL, CO2, BUN, CREATININE, GLU, MG, PHOS, CALCIUM, ALBUMIN, PROT, ALKPHOS, ALT, AST, BILITOT in the last 72 hours.    INR:  No results for input(s): PT, INR, PROTIME, APTT in the last 72 hours.    Diagnostic Studies: No relevant studies.    EKG: No recent studies available.    2D ECHO:  Results for orders placed or performed during the hospital encounter of 04/26/18   2D Echo w/ Color Flow Doppler   Result Value Ref Range    EF + QEF 50 55 - 65    Mitral Valve Regurgitation MODERATE TO SEVERE (A)     Aortic Valve Regurgitation TRIVIAL     Est. PA Systolic Pressure 48.7 (A)     Tricuspid Valve Regurgitation MODERATE (A)          ASSESSMENT/PLAN:           Pre-op Assessment    I have reviewed the Patient Summary Reports.    I have reviewed the NPO Status.   I have reviewed the Medications.     Review of Systems  Anesthesia Hx:  No problems with previous Anesthesia  History of prior surgery of interest to airway management or planning: Denies Family Hx of Anesthesia complications.   Denies Personal Hx of Anesthesia complications.   Hematology/Oncology:  Hematology Normal   Oncology Normal     EENT/Dental:EENT/Dental Normal   Cardiovascular:   Hypertension Past MI CAD   CHF    Pulmonary:  Pulmonary Normal    Renal/:   Chronic Renal Disease    Hepatic/GI:   GERD    Neurological:   Neuromuscular Disease,    Endocrine:   Diabetes    Psych:  Psychiatric Normal           Physical  Exam  General: Well nourished, Alert, Oriented and Cooperative    Airway:  Mallampati: III / II  Mouth Opening: Normal  TM Distance: Normal  Tongue: Normal  Neck ROM: Normal ROM    Chest/Lungs:  Normal Respiratory Rate    Heart:  Rate: Normal        Anesthesia Plan  Type of Anesthesia, risks & benefits discussed:    Anesthesia Type: Gen ETT  Intra-op Monitoring Plan: Standard ASA Monitors  Post Op Pain Control Plan: multimodal analgesia  Induction:  IV  Airway Plan: Direct, Post-Induction  Informed Consent: Informed consent signed with the Patient and all parties understand the risks and agree with anesthesia plan.  All questions answered.   ASA Score: 3  Day of Surgery Review of History & Physical: H&P Update referred to the surgeon/provider.    Ready For Surgery From Anesthesia Perspective.     .

## 2023-06-13 NOTE — PRE-PROCEDURE INSTRUCTIONS
PreOp Instructions given:   - Verbal medication information (what to hold and what to take)   - NPO guidelines 2400  - Arrival place directions given; time to be given the day before procedure by the   Surgeon's Office 1030 DOSC  - Bathing with antibacterial soap   - Don't wear any jewelry or bring any valuables AM of surgery   - No makeup or moisturizer to face   - No perfume/cologne, powder, lotions or aftershave   Pt. verbalized understanding.   Pt denies any h/o Anesthesia/Sedation complications or side effects.

## 2023-06-14 ENCOUNTER — TELEPHONE (OUTPATIENT)
Dept: ENDOSCOPY | Facility: HOSPITAL | Age: 83
End: 2023-06-14
Payer: MEDICARE

## 2023-06-14 ENCOUNTER — HOSPITAL ENCOUNTER (OUTPATIENT)
Facility: HOSPITAL | Age: 83
Discharge: HOME | End: 2023-06-14
Attending: STUDENT IN AN ORGANIZED HEALTH CARE EDUCATION/TRAINING PROGRAM | Admitting: STUDENT IN AN ORGANIZED HEALTH CARE EDUCATION/TRAINING PROGRAM
Payer: MEDICARE

## 2023-06-14 ENCOUNTER — ANESTHESIA (OUTPATIENT)
Dept: SURGERY | Facility: HOSPITAL | Age: 83
End: 2023-06-14
Payer: MEDICARE

## 2023-06-14 VITALS
BODY MASS INDEX: 29.37 KG/M2 | HEART RATE: 61 BPM | RESPIRATION RATE: 20 BRPM | WEIGHT: 172 LBS | TEMPERATURE: 97 F | SYSTOLIC BLOOD PRESSURE: 151 MMHG | OXYGEN SATURATION: 96 % | HEIGHT: 64 IN | DIASTOLIC BLOOD PRESSURE: 68 MMHG

## 2023-06-14 DIAGNOSIS — R04.0 RECURRENT EPISTAXIS: ICD-10-CM

## 2023-06-14 DIAGNOSIS — R04.0 EPISTAXIS: Primary | ICD-10-CM

## 2023-06-14 LAB
BASOPHILS # BLD AUTO: 0.02 K/UL (ref 0–0.2)
BASOPHILS NFR BLD: 0.4 % (ref 0–1.9)
DIFFERENTIAL METHOD: ABNORMAL
EOSINOPHIL # BLD AUTO: 0.1 K/UL (ref 0–0.5)
EOSINOPHIL NFR BLD: 2.1 % (ref 0–8)
ERYTHROCYTE [DISTWIDTH] IN BLOOD BY AUTOMATED COUNT: 14.5 % (ref 11.5–14.5)
HCT VFR BLD AUTO: 27.3 % (ref 40–54)
HGB BLD-MCNC: 8.5 G/DL (ref 14–18)
IMM GRANULOCYTES # BLD AUTO: 0 K/UL (ref 0–0.04)
IMM GRANULOCYTES NFR BLD AUTO: 0 % (ref 0–0.5)
LYMPHOCYTES # BLD AUTO: 1.3 K/UL (ref 1–4.8)
LYMPHOCYTES NFR BLD: 24.5 % (ref 18–48)
MCH RBC QN AUTO: 31.5 PG (ref 27–31)
MCHC RBC AUTO-ENTMCNC: 31.1 G/DL (ref 32–36)
MCV RBC AUTO: 101 FL (ref 82–98)
MONOCYTES # BLD AUTO: 0.6 K/UL (ref 0.3–1)
MONOCYTES NFR BLD: 11.7 % (ref 4–15)
NEUTROPHILS # BLD AUTO: 3.2 K/UL (ref 1.8–7.7)
NEUTROPHILS NFR BLD: 61.3 % (ref 38–73)
NRBC BLD-RTO: 0 /100 WBC
PLATELET # BLD AUTO: 158 K/UL (ref 150–450)
PMV BLD AUTO: 10.3 FL (ref 9.2–12.9)
POCT GLUCOSE: 98 MG/DL (ref 70–110)
RBC # BLD AUTO: 2.7 M/UL (ref 4.6–6.2)
WBC # BLD AUTO: 5.22 K/UL (ref 3.9–12.7)

## 2023-06-14 PROCEDURE — 31238 NSL/SINS NDSC SRG NSL HEMRRG: CPT | Mod: 59,LT,, | Performed by: STUDENT IN AN ORGANIZED HEALTH CARE EDUCATION/TRAINING PROGRAM

## 2023-06-14 PROCEDURE — 85025 COMPLETE CBC W/AUTO DIFF WBC: CPT | Performed by: STUDENT IN AN ORGANIZED HEALTH CARE EDUCATION/TRAINING PROGRAM

## 2023-06-14 PROCEDURE — D9220A PRA ANESTHESIA: ICD-10-PCS | Mod: ,,, | Performed by: ANESTHESIOLOGY

## 2023-06-14 PROCEDURE — 36000709 HC OR TIME LEV III EA ADD 15 MIN: Performed by: STUDENT IN AN ORGANIZED HEALTH CARE EDUCATION/TRAINING PROGRAM

## 2023-06-14 PROCEDURE — 31241 PR ENDOSCOPY, NASAL/SINUS, W/LIGATION OF SPHENOPALATINE ARTERY: ICD-10-PCS | Mod: 50,,, | Performed by: STUDENT IN AN ORGANIZED HEALTH CARE EDUCATION/TRAINING PROGRAM

## 2023-06-14 PROCEDURE — 63600175 PHARM REV CODE 636 W HCPCS: Performed by: STUDENT IN AN ORGANIZED HEALTH CARE EDUCATION/TRAINING PROGRAM

## 2023-06-14 PROCEDURE — 71000044 HC DOSC ROUTINE RECOVERY FIRST HOUR: Performed by: STUDENT IN AN ORGANIZED HEALTH CARE EDUCATION/TRAINING PROGRAM

## 2023-06-14 PROCEDURE — 61782 SCAN PROC CRANIAL EXTRA: CPT | Mod: ,,, | Performed by: STUDENT IN AN ORGANIZED HEALTH CARE EDUCATION/TRAINING PROGRAM

## 2023-06-14 PROCEDURE — 25000003 PHARM REV CODE 250: Performed by: STUDENT IN AN ORGANIZED HEALTH CARE EDUCATION/TRAINING PROGRAM

## 2023-06-14 PROCEDURE — 31288 PR NASAL/SINUS ENDOSCOPY,REMV TISS SPHENOID: ICD-10-PCS | Mod: 50,51,, | Performed by: STUDENT IN AN ORGANIZED HEALTH CARE EDUCATION/TRAINING PROGRAM

## 2023-06-14 PROCEDURE — 31288 NASAL/SINUS ENDOSCOPY SURG: CPT | Mod: 50,51,, | Performed by: STUDENT IN AN ORGANIZED HEALTH CARE EDUCATION/TRAINING PROGRAM

## 2023-06-14 PROCEDURE — 37000009 HC ANESTHESIA EA ADD 15 MINS: Performed by: STUDENT IN AN ORGANIZED HEALTH CARE EDUCATION/TRAINING PROGRAM

## 2023-06-14 PROCEDURE — 36000708 HC OR TIME LEV III 1ST 15 MIN: Performed by: STUDENT IN AN ORGANIZED HEALTH CARE EDUCATION/TRAINING PROGRAM

## 2023-06-14 PROCEDURE — 82962 GLUCOSE BLOOD TEST: CPT | Performed by: STUDENT IN AN ORGANIZED HEALTH CARE EDUCATION/TRAINING PROGRAM

## 2023-06-14 PROCEDURE — C1894 INTRO/SHEATH, NON-LASER: HCPCS | Performed by: STUDENT IN AN ORGANIZED HEALTH CARE EDUCATION/TRAINING PROGRAM

## 2023-06-14 PROCEDURE — D9220A PRA ANESTHESIA: Mod: ,,, | Performed by: ANESTHESIOLOGY

## 2023-06-14 PROCEDURE — 61782 PR STEREOTACTIC COMP ASSIST PROC,CRANIAL,EXTRADURAL: ICD-10-PCS | Mod: ,,, | Performed by: STUDENT IN AN ORGANIZED HEALTH CARE EDUCATION/TRAINING PROGRAM

## 2023-06-14 PROCEDURE — 71000015 HC POSTOP RECOV 1ST HR: Performed by: STUDENT IN AN ORGANIZED HEALTH CARE EDUCATION/TRAINING PROGRAM

## 2023-06-14 PROCEDURE — 31241 NSL/SNS NDSC LIG SPHNPTN ART: CPT | Mod: 50,,, | Performed by: STUDENT IN AN ORGANIZED HEALTH CARE EDUCATION/TRAINING PROGRAM

## 2023-06-14 PROCEDURE — 31238 PR NASAL/SINUS ENDOSCOPY,W/CONTROL NASAL HEM: ICD-10-PCS | Mod: 59,LT,, | Performed by: STUDENT IN AN ORGANIZED HEALTH CARE EDUCATION/TRAINING PROGRAM

## 2023-06-14 PROCEDURE — 27201423 OPTIME MED/SURG SUP & DEVICES STERILE SUPPLY: Performed by: STUDENT IN AN ORGANIZED HEALTH CARE EDUCATION/TRAINING PROGRAM

## 2023-06-14 PROCEDURE — 37000008 HC ANESTHESIA 1ST 15 MINUTES: Performed by: STUDENT IN AN ORGANIZED HEALTH CARE EDUCATION/TRAINING PROGRAM

## 2023-06-14 RX ORDER — FENTANYL CITRATE 50 UG/ML
25 INJECTION, SOLUTION INTRAMUSCULAR; INTRAVENOUS EVERY 5 MIN PRN
Status: DISCONTINUED | OUTPATIENT
Start: 2023-06-14 | End: 2023-10-05

## 2023-06-14 RX ORDER — HYDROMORPHONE HYDROCHLORIDE 1 MG/ML
0.2 INJECTION, SOLUTION INTRAMUSCULAR; INTRAVENOUS; SUBCUTANEOUS EVERY 5 MIN PRN
Status: ACTIVE | OUTPATIENT
Start: 2023-06-14

## 2023-06-14 RX ORDER — KETAMINE HCL IN 0.9 % NACL 50 MG/5 ML
SYRINGE (ML) INTRAVENOUS
Status: DISCONTINUED | OUTPATIENT
Start: 2023-06-14 | End: 2023-06-14

## 2023-06-14 RX ORDER — LIDOCAINE HYDROCHLORIDE AND EPINEPHRINE 10; 10 MG/ML; UG/ML
INJECTION, SOLUTION INFILTRATION; PERINEURAL
Status: DISCONTINUED | OUTPATIENT
Start: 2023-06-14 | End: 2023-06-14 | Stop reason: HOSPADM

## 2023-06-14 RX ORDER — ROCURONIUM BROMIDE 10 MG/ML
INJECTION, SOLUTION INTRAVENOUS
Status: DISCONTINUED | OUTPATIENT
Start: 2023-06-14 | End: 2023-06-14

## 2023-06-14 RX ORDER — OXYMETAZOLINE HCL 0.05 %
SPRAY, NON-AEROSOL (ML) NASAL
Status: DISCONTINUED | OUTPATIENT
Start: 2023-06-14 | End: 2023-06-14 | Stop reason: HOSPADM

## 2023-06-14 RX ORDER — DEXTROMETHORPHAN HYDROBROMIDE, GUAIFENESIN 5; 100 MG/5ML; MG/5ML
650 LIQUID ORAL EVERY 6 HOURS PRN
Qty: 40 TABLET | Refills: 0
Start: 2023-06-14 | End: 2023-06-24

## 2023-06-14 RX ORDER — SODIUM CHLORIDE 0.9 % (FLUSH) 0.9 %
10 SYRINGE (ML) INJECTION
Status: ACTIVE | OUTPATIENT
Start: 2023-06-14

## 2023-06-14 RX ORDER — ESMOLOL HYDROCHLORIDE 10 MG/ML
INJECTION INTRAVENOUS
Status: DISCONTINUED | OUTPATIENT
Start: 2023-06-14 | End: 2023-06-14

## 2023-06-14 RX ORDER — SODIUM CHLORIDE 0.9 % (FLUSH) 0.9 %
10 SYRINGE (ML) INJECTION
Status: DISCONTINUED | OUTPATIENT
Start: 2023-06-14 | End: 2023-06-14 | Stop reason: HOSPADM

## 2023-06-14 RX ORDER — CEFAZOLIN SODIUM 1 G/3ML
INJECTION, POWDER, FOR SOLUTION INTRAMUSCULAR; INTRAVENOUS
Status: DISCONTINUED | OUTPATIENT
Start: 2023-06-14 | End: 2023-06-14

## 2023-06-14 RX ORDER — SODIUM CHLORIDE 9 MG/ML
INJECTION, SOLUTION INTRAVENOUS CONTINUOUS PRN
Status: DISCONTINUED | OUTPATIENT
Start: 2023-06-14 | End: 2023-06-14

## 2023-06-14 RX ORDER — ONDANSETRON 2 MG/ML
INJECTION INTRAMUSCULAR; INTRAVENOUS
Status: DISCONTINUED | OUTPATIENT
Start: 2023-06-14 | End: 2023-06-14

## 2023-06-14 RX ORDER — DEXAMETHASONE SODIUM PHOSPHATE 4 MG/ML
INJECTION, SOLUTION INTRA-ARTICULAR; INTRALESIONAL; INTRAMUSCULAR; INTRAVENOUS; SOFT TISSUE
Status: DISCONTINUED | OUTPATIENT
Start: 2023-06-14 | End: 2023-06-14

## 2023-06-14 RX ORDER — FENTANYL CITRATE 50 UG/ML
INJECTION, SOLUTION INTRAMUSCULAR; INTRAVENOUS
Status: DISCONTINUED | OUTPATIENT
Start: 2023-06-14 | End: 2023-06-14

## 2023-06-14 RX ORDER — HALOPERIDOL 5 MG/ML
0.5 INJECTION INTRAMUSCULAR EVERY 10 MIN PRN
Status: DISCONTINUED | OUTPATIENT
Start: 2023-06-14 | End: 2023-12-15

## 2023-06-14 RX ORDER — EPINEPHRINE 1 MG/ML
INJECTION, SOLUTION, CONCENTRATE INTRAVENOUS
Status: DISCONTINUED | OUTPATIENT
Start: 2023-06-14 | End: 2023-06-14 | Stop reason: HOSPADM

## 2023-06-14 RX ORDER — LIDOCAINE HYDROCHLORIDE 20 MG/ML
INJECTION INTRAVENOUS
Status: DISCONTINUED | OUTPATIENT
Start: 2023-06-14 | End: 2023-06-14

## 2023-06-14 RX ORDER — DEXMEDETOMIDINE HYDROCHLORIDE 100 UG/ML
INJECTION, SOLUTION INTRAVENOUS
Status: DISCONTINUED | OUTPATIENT
Start: 2023-06-14 | End: 2023-06-14

## 2023-06-14 RX ORDER — PHENYLEPHRINE HYDROCHLORIDE 10 MG/ML
INJECTION INTRAVENOUS
Status: DISCONTINUED | OUTPATIENT
Start: 2023-06-14 | End: 2023-06-14

## 2023-06-14 RX ORDER — PROPOFOL 10 MG/ML
VIAL (ML) INTRAVENOUS
Status: DISCONTINUED | OUTPATIENT
Start: 2023-06-14 | End: 2023-06-14

## 2023-06-14 RX ADMIN — SODIUM CHLORIDE 0.3 MCG/KG/MIN: 9 INJECTION, SOLUTION INTRAVENOUS at 01:06

## 2023-06-14 RX ADMIN — ROCURONIUM BROMIDE 50 MG: 10 INJECTION, SOLUTION INTRAVENOUS at 01:06

## 2023-06-14 RX ADMIN — SODIUM CHLORIDE, SODIUM GLUCONATE, SODIUM ACETATE, POTASSIUM CHLORIDE, MAGNESIUM CHLORIDE, SODIUM PHOSPHATE, DIBASIC, AND POTASSIUM PHOSPHATE: .53; .5; .37; .037; .03; .012; .00082 INJECTION, SOLUTION INTRAVENOUS at 02:06

## 2023-06-14 RX ADMIN — DEXMEDETOMIDINE HYDROCHLORIDE 8 MCG: 100 INJECTION, SOLUTION INTRAVENOUS at 03:06

## 2023-06-14 RX ADMIN — CEFAZOLIN 2 G: 330 INJECTION, POWDER, FOR SOLUTION INTRAMUSCULAR; INTRAVENOUS at 01:06

## 2023-06-14 RX ADMIN — SODIUM CHLORIDE: 0.9 INJECTION, SOLUTION INTRAVENOUS at 01:06

## 2023-06-14 RX ADMIN — DEXMEDETOMIDINE HYDROCHLORIDE 4 MCG: 100 INJECTION, SOLUTION INTRAVENOUS at 03:06

## 2023-06-14 RX ADMIN — LIDOCAINE HYDROCHLORIDE 50 MG: 20 INJECTION INTRAVENOUS at 01:06

## 2023-06-14 RX ADMIN — PHENYLEPHRINE HYDROCHLORIDE 150 MCG: 10 INJECTION INTRAVENOUS at 02:06

## 2023-06-14 RX ADMIN — ONDANSETRON 4 MG: 2 INJECTION INTRAMUSCULAR; INTRAVENOUS at 03:06

## 2023-06-14 RX ADMIN — ROCURONIUM BROMIDE 30 MG: 10 INJECTION, SOLUTION INTRAVENOUS at 02:06

## 2023-06-14 RX ADMIN — ESMOLOL HYDROCHLORIDE 30 MG: 100 INJECTION, SOLUTION INTRAVENOUS at 01:06

## 2023-06-14 RX ADMIN — CEFAZOLIN 2 G: 330 INJECTION, POWDER, FOR SOLUTION INTRAMUSCULAR; INTRAVENOUS at 02:06

## 2023-06-14 RX ADMIN — ROCURONIUM BROMIDE 20 MG: 10 INJECTION, SOLUTION INTRAVENOUS at 03:06

## 2023-06-14 RX ADMIN — Medication 20 MG: at 02:06

## 2023-06-14 RX ADMIN — FENTANYL CITRATE 50 MCG: 50 INJECTION, SOLUTION INTRAMUSCULAR; INTRAVENOUS at 01:06

## 2023-06-14 RX ADMIN — DEXAMETHASONE SODIUM PHOSPHATE 4 MG: 4 INJECTION, SOLUTION INTRAMUSCULAR; INTRAVENOUS at 02:06

## 2023-06-14 RX ADMIN — SUGAMMADEX 200 MG: 100 INJECTION, SOLUTION INTRAVENOUS at 03:06

## 2023-06-14 RX ADMIN — PROPOFOL 120 MG: 10 INJECTION, EMULSION INTRAVENOUS at 01:06

## 2023-06-14 NOTE — TELEPHONE ENCOUNTER
Left message instructing patient to call dept @ [phone] between 8am-3pm.    Possible cancellation, had sinus surgery today, see message dated 6/12.  Arrival time to be given @ 0900  SIXTO/Colon (Golytely)

## 2023-06-14 NOTE — PATIENT INSTRUCTIONS
No nose blowing for the next week.  Sneeze with mouth open.  We will set you up to see Dr. Russell next week.

## 2023-06-14 NOTE — TRANSFER OF CARE
"Anesthesia Transfer of Care Note    Patient: Dariel Urbina    Procedure(s) Performed: Procedure(s) (LRB):  FESS (FUNCTIONAL ENDOSCOPIC SINUS SURGERY) (Bilateral)  LIGATION, ARTERY, SPHENOPALATINE, ENDOSCOPIC (Bilateral)    Patient location: PACU    Anesthesia Type: general    Transport from OR: Transported from OR on 6-10 L/min O2 by face mask with adequate spontaneous ventilation    Post pain: adequate analgesia    Post assessment: no apparent anesthetic complications    Post vital signs: stable    Level of consciousness: responds to stimulation    Nausea/Vomiting: no nausea/vomiting    Complications: none    Transfer of care protocol was followed      Last vitals:   Visit Vitals  BP (!) 149/67 (BP Location: Left arm, Patient Position: Lying)   Pulse 67   Temp 36.3 °C (97.3 °F) (Temporal)   Resp 16   Ht 5' 4" (1.626 m)   Wt 78 kg (172 lb)   SpO2 96%   BMI 29.52 kg/m²     "

## 2023-06-14 NOTE — H&P
To OR for endoscopic sphenopalatine artery ligation.      Subjective:      Dariel is a 82 y.o. male who comes for follow-up of epistaxis.  His last visit with me was on 5/9/2023. Still having continued left sided epistaxis. Patient reports about 2-3 days of bleeding control after cautery then bleeding recurs. CBC drawn at last visit did not show significant drop in H/H. Most recent Hgb at 8.6 from 9.4.    His current sinus regime consists of: Afrin PRN for bleeding.     The patient's medications, allergies, past medical, surgical, social and family histories were reviewed and updated as appropriate.    A detailed review of systems was obtained with pertinent positives as per the above HPI, and otherwise negative.        Objective:     BP (!) 152/69 (BP Location: Right arm, Patient Position: Sitting)   Pulse 75   Wt 78.1 kg (172 lb 2.9 oz)   BMI 29.55 kg/m²        Constitutional:   Vital signs are normal. He appears well-developed and well-nourished.     Head:  Normocephalic and atraumatic.     Ears:  Hearing normal to normal and whispered voice; external ear normal without scars, lesions, or masses; ear canal, tympanic membrane, and middle ear normal..   Right Ear: No swelling. Tympanic membrane is not perforated and not bulging. No middle ear effusion.   Left Ear: No swelling. Tympanic membrane is not perforated and not bulging.  No middle ear effusion.     Nose:  Nose normal including turbinates, nasal mucosa, sinuses and nasal septum. No epistaxis.     Mouth/Throat  Oropharynx clear and moist without lesions or asymmetry and normal uvula midline. Normal dentition. No tonsillar abscesses. Tonsillar exudate.      Neck:  Neck normal without thyromegaly masses, asymmetry, normal tracheal structure, crepitus, and tenderness, thyroid normal, trachea normal, phonation normal, full range of motion with neck supple and no adenopathy. No stridor present.        Head (right side): No submental adenopathy present.         Head (left side): No submental adenopathy present.     He has no cervical adenopathy.     Pulmonary/Chest:   No stridor.     Procedure    Nasal Endoscopy with Repeat Control of Epistaxis    After written consent was obtained the nose was anesthetized with topical pontocaine and phenylephrine pledgets.   silver nitrate was used to cauterize the prominent vascularity on the left anterior septum.  The patient tolerated the procedure well and there were no complications.  Hemostasis was obtained.  Bacitracin ointment was placed after cauterization.    Data Reviewed    WBC (K/uL)   Date Value   05/09/2023 6.04     Eosinophil % (%)   Date Value   05/09/2023 1.8     Eos # (K/uL)   Date Value   05/09/2023 0.1     Platelets (K/uL)   Date Value   05/09/2023 173     Glucose (mg/dL)   Date Value   04/18/2023 100     No results found for: IGE    No sinus imaging available.    Assessment:     1. Recurrent epistaxis      Plan:     - He would benefit from bilateral SPA ligation for the treatment of his condition.  I discussed the risks, benefits and alternatives to surgery with the patient, as well as the expected postoperative course.  I gave him the opportunity to ask questions and I answered all of them.  I provided relevant printed information on his condition for him to review at home.  Same-day discharge is anticipated.  He may have anesthesia triage by telephone.   The surgery will be tentatively scheduled for 6/14/23.  He will return for a postoperative visit 1 week after surgery.  - The risks and benefits of endoscopic surgery were discussed with the patient in detail, which include but are not limited to pain, bleeding, infection, the need for reoperation, injury to orbit or orbital contents, injury to brain or meninges, and unforeseeable complications of surgery including myocardial infarction, stroke or pulmonary embolus.  - Patient is currently bridging from coumadin to Lovenox for his scheduled colonoscopy later this  week.   - With continued large volume epistaxis would plan to expedite SPA ligation  - Will draw cbc morning of surgery.     Jono Russell MD

## 2023-06-14 NOTE — OP NOTE
6/14/2023    PREOPERATIVE DIAGNOSIS(ES):      Recurrent epistaxis.     POSTOPERATIVE DIAGNOSIS(ES):       Same epistaxis.     PROCEDURE:    1.   Bilateral endoscopic sphenopalatine artery ligation.  2.   Bilateral sphenoidotomy with removal of tissue.  3.   Nasal endoscopy with control of epistaxis.  4.   Use of Medtronic neuronavigation.     ANESTHESIA:  General endotracheal.     SURGEON(S):  Jono Russell MD     ASSISTANT:  Ervin Gomez MD.     ESTIMATED BLOOD LOSS:  Minimal.     COMPLICATIONS:  None     FINDINGS:    1.   Ulcerative granulation tissue of the left anterior septum.  2.   Previous maxillary antrostomy bilaterally.  3.   Normal SPA anatomy bilaterally.     INDICATIONS FOR PROCEDURE:  This is a 82-year-old male with a history of chronic anticoagulation and recurrent episodes of epistaxis. Conservative management wad trailed with repeated in-office cautery. Despite this he continued to have recurrent left sided epistaxis. He requires long term anticoagulation.  The risks, benefits, and alternatives of surgery were discussed at length with the patient and include, but are not limited to bleeding, infection, need for revision surgery, injury to the eye or brain, double vision, loss of vision, cerebrospinal fluid leakage, meningitis, chronic sinusitis, nasal obstruction, nasal septal perforation, numbness to the teeth or cheek, or need for time and expense off work.  The patient demonstrates understanding and wishes to proceed.  All of his questions were answered to his satisfaction.     DESCRIPTION OF PROCEDURE:  The patient was taken back to the operating room, and after successful induction of general anesthesia, was prepped and draped in a sterile fashion.  The Medtronic neuronavigation system was employed.  The images were uploaded.  The instruments were calibrated and accuracy was confirmed.      Evaluation of the  nasal cavities revealed evidence of  mucosal trauma and mucosal oozing throughout the  left septal mucosal surfaces, which were cauterized using suction monopolar cautery.  Cautery was also performed along the region of the insertion of the greater palatine artery into the nasal floor.  A total of 8 cc of 1% lidocaine with epinephrine 1:100,000 were used intranasally for local anesthetic.      Next, attention was turned to the left side. The inferior turbinate was outfractured and the middle turbinate was medialized.  The uncinate process was previously removed allowing excellent access to the medial maxillary wall.  Copious blood clot was suctioned free from the maxillary sinus.  Next, a mucosal flap was elevated over the lateral nasal sidewall along the palatine bone until the poly ethmoidalis was identified.  Once identified, the poly ethmoidalis was taken down, and bony removal was carried posterolaterally along the face of the posterior wall of the maxillary sinus to identify the sphenopalatine artery as it arose off the internal maxillary artery.  Approximately 2 medium clips were placed along the course of the sphenopalatine artery after it was fully dissected free.  Next, a sphenoidotomy was created in order to ensure that all branches of the sphenopalatine artery were appropriately ligated.  The bony mucosa just inferior to the natural sphenoid ostium was taken down using a 3-mm Koros Kerrison rongeur, and pulsatile bleeding was encountered when the posterior septal branch of the sphenopalatine artery was cut.  The free edges of the sphenoid sylvie-ostium were cauterized to ensure hemostasis, and copious blood clot was suctioned free from the sphenoid sinus.      Attention was then turned to the right side and a similar procedure was performed.  Again, the middle turbinate was medialized.  The inferior turbinate was outfractured.  The uncinate process was previously removed allowing excellent access to the medial maxillary wall.  Next, the mucosal flap was elevated over the lateral nasal  sidewall over the palatine bone until the poly ethmoidalis was identified.  The bone of the poly ethmoidalis was taken down and carried posterolaterally over the face of the posterior wall of the maxillary sinus using a 3-mm Koros Kerrison rongeur to identify the sphenopalatine artery as it emanated off the internal maxillary artery.  The sphenopalatine artery was then dissected free, and 2 medium clips were placed along its course.  Next, in order to ensure that all branches of the sphenopalatine artery were appropriately ligated, a sphenoidotomy was performed.  The natural sphenoid ostium was identified, and bony mucosa were removed inferiorly to come through the posterior septal branch of the sphenopalatine artery, which showed pulsatile bleeding.  The free edges of the sphenoidotomy were cauterized using suction monopolar cautery to ensure hemostasis, and copious amounts of clot were suctioned free from the sphenoid sinus.      At the conclusion of the case, the nose was copiously irrigated with saline solution, and hemostasis was obtained throughout using suction monopolar cautery as well as FloSeal and dilute epinephrine/saline pledgets until hemostasis was obtained.  NasoPore was placed in the middle meatus to ensure appropriate middle turbinate medialization.      The patient was then awakened, extubated, and taken to the recovery room in stable condition.  I was scrubbed and personally present during all portions of this procedure and I was immediately available throughout the entire procedure.    Jono Russell MD

## 2023-06-14 NOTE — ANESTHESIA PROCEDURE NOTES
Intubation    Date/Time: 6/14/2023 1:53 PM  Performed by: Johnna Olivas MD  Authorized by: Lloyd June Jr., MD     Intubation:     Induction:  Intravenous    Intubated:  Postinduction    Mask Ventilation:  Easy mask    Attempts:  2    Attempted By:  Resident anesthesiologist    Method of Intubation:  Direct    Blade:  Jocelyn 3    Laryngeal View Grade: Grade IIA - cords partially seen      Attempted By (2nd Attempt):  Resident anesthesiologist    Method of Intubation (2nd Attempt):  Video laryngoscopy    Blade (2nd Attempt):  Osorio 3    Laryngeal View Grade (2nd Attempt): Grade I - full view of cords      Difficult Airway Encountered?: No      Complications:  None    Airway Device:  Oral endotracheal tube    Airway Device Size:  7.5    Style/Cuff Inflation:  Cuffed (inflated to minimal occlusive pressure)    Inflation Amount (mL):  7    Tube secured:  24    Secured at:  The lips    Placement Verified By:  Capnometry    Complicating Factors:  None    Findings Post-Intubation:  BS equal bilateral and atraumatic/condition of teeth unchanged

## 2023-06-14 NOTE — PLAN OF CARE
Patient was prepared for surgery.    DM is on patient's chart. Patient denied having DM and said he does not check his blood glucose at home.Patient's wife and historian said he is borderline and the doctor checks it on visits. BG this morning is 98.

## 2023-06-14 NOTE — BRIEF OP NOTE
[facility] - Surgery ([facility])  Brief Operative Note    Surgery Date: 6/14/2023     Surgeon(s) and Role:     * Jono Russell MD - Primary     * Tank Gomez MD - Resident - Assisting    Pre-op Diagnosis:  Recurrent epistaxis [R04.0]    Post-op Diagnosis:  Post-Op Diagnosis Codes:     * Recurrent epistaxis [R04.0]    Procedure(s) (LRB):  FESS (FUNCTIONAL ENDOSCOPIC SINUS SURGERY) (Bilateral)  LIGATION, ARTERY, SPHENOPALATINE, ENDOSCOPIC (Bilateral)    Anesthesia: General    Operative Findings: see full op note    Estimated Blood Loss: * No values recorded between 6/14/2023  2:31 PM and 6/14/2023  4:02 PM *         Specimens:   Specimen (24h ago, onward)      None              Discharge Note    OUTCOME: Patient tolerated treatment/procedure well without complication and is now ready for discharge.    DISPOSITION: Home or Self Care    FINAL DIAGNOSIS:  Recurrent epistaxis    FOLLOWUP: In clinic    DISCHARGE INSTRUCTIONS:    Discharge Procedure Orders   No dressing needed     Activity as tolerated

## 2023-06-15 ENCOUNTER — PATIENT MESSAGE (OUTPATIENT)
Dept: ENDOSCOPY | Facility: HOSPITAL | Age: 83
End: 2023-06-15
Payer: MEDICARE

## 2023-06-15 NOTE — ANESTHESIA POSTPROCEDURE EVALUATION
Anesthesia Post Evaluation    Patient: Dariel Urbina    Procedure(s) Performed: Procedure(s) (LRB):  FESS (FUNCTIONAL ENDOSCOPIC SINUS SURGERY) (Bilateral)  LIGATION, ARTERY, SPHENOPALATINE, ENDOSCOPIC (Bilateral)    Final Anesthesia Type: general      Patient location during evaluation: PACU  Patient participation: Yes- Able to Participate  Level of consciousness: awake and alert and oriented  Post-procedure vital signs: reviewed and stable  Pain management: adequate  Airway patency: patent    PONV status at discharge: No PONV  Anesthetic complications: no      Cardiovascular status: blood pressure returned to baseline, hemodynamically stable and stable  Respiratory status: unassisted, room air and spontaneous ventilation  Hydration status: euvolemic  Follow-up not needed.          Vitals Value Taken Time   /68 06/14/23 1700   Temp 36.3 °C (97.3 °F) 06/14/23 1700   Pulse 61 06/14/23 1700   Resp 20 06/14/23 1700   SpO2 96 % 06/14/23 1700         No case tracking events are documented in the log.      Pain/Sanjeev Score: Sanjeev Score: 10 (6/14/2023  4:30 PM)

## 2023-06-15 NOTE — PROGRESS NOTES
Pt seen in ED yesterday for recurrent epistaxis.  Pt EGD/c-scope 6/16 has been cancelled.  FESS completed yesterday.

## 2023-06-19 ENCOUNTER — LAB VISIT (OUTPATIENT)
Dept: LAB | Facility: HOSPITAL | Age: 83
End: 2023-06-19
Attending: INTERNAL MEDICINE
Payer: MEDICARE

## 2023-06-19 ENCOUNTER — ANTI-COAG VISIT (OUTPATIENT)
Dept: CARDIOLOGY | Facility: CLINIC | Age: 83
End: 2023-06-19
Payer: MEDICARE

## 2023-06-19 DIAGNOSIS — Z95.2 H/O MECHANICAL AORTIC VALVE REPLACEMENT: ICD-10-CM

## 2023-06-19 DIAGNOSIS — Z79.01 ANTICOAGULANT LONG-TERM USE: ICD-10-CM

## 2023-06-19 DIAGNOSIS — Z79.01 ANTICOAGULANT LONG-TERM USE: Primary | ICD-10-CM

## 2023-06-19 LAB
INR PPP: 1.4 (ref 0.8–1.2)
PROTHROMBIN TIME: 14.3 SEC (ref 9–12.5)

## 2023-06-19 PROCEDURE — 36415 COLL VENOUS BLD VENIPUNCTURE: CPT | Performed by: INTERNAL MEDICINE

## 2023-06-19 PROCEDURE — 93793 PR ANTICOAGULANT MGMT FOR PT TAKING WARFARIN: ICD-10-PCS | Mod: S$GLB,,,

## 2023-06-19 PROCEDURE — 85610 PROTHROMBIN TIME: CPT | Performed by: INTERNAL MEDICINE

## 2023-06-19 PROCEDURE — 93793 ANTICOAG MGMT PT WARFARIN: CPT | Mod: S$GLB,,,

## 2023-06-19 NOTE — PROGRESS NOTES
INR not at goal. Medications, chart, and patient findings reviewed. See calendar for adjustments to dose and follow up plan.  Pt s/p FESS and remains subtherapeutic.  Will continue lovenox until INR > 2.0.

## 2023-06-22 ENCOUNTER — ANTI-COAG VISIT (OUTPATIENT)
Dept: CARDIOLOGY | Facility: CLINIC | Age: 83
End: 2023-06-22
Payer: MEDICARE

## 2023-06-22 ENCOUNTER — LAB VISIT (OUTPATIENT)
Dept: LAB | Facility: HOSPITAL | Age: 83
End: 2023-06-22
Attending: INTERNAL MEDICINE
Payer: MEDICARE

## 2023-06-22 DIAGNOSIS — Z79.01 ANTICOAGULANT LONG-TERM USE: Primary | ICD-10-CM

## 2023-06-22 DIAGNOSIS — Z95.2 H/O MECHANICAL AORTIC VALVE REPLACEMENT: ICD-10-CM

## 2023-06-22 DIAGNOSIS — Z79.01 ANTICOAGULANT LONG-TERM USE: ICD-10-CM

## 2023-06-22 LAB
INR PPP: 1.8 (ref 0.8–1.2)
PROTHROMBIN TIME: 18.8 SEC (ref 9–12.5)

## 2023-06-22 PROCEDURE — 36415 COLL VENOUS BLD VENIPUNCTURE: CPT | Performed by: INTERNAL MEDICINE

## 2023-06-22 PROCEDURE — 85610 PROTHROMBIN TIME: CPT | Performed by: INTERNAL MEDICINE

## 2023-06-22 PROCEDURE — 93793 PR ANTICOAGULANT MGMT FOR PT TAKING WARFARIN: ICD-10-PCS | Mod: S$GLB,,,

## 2023-06-22 PROCEDURE — 93793 ANTICOAG MGMT PT WARFARIN: CPT | Mod: S$GLB,,,

## 2023-06-22 NOTE — TELEPHONE ENCOUNTER
No care due was identified.  [facility] Embedded Care Due Messages. Reference number: [id number].   6/22/2023 4:56:53 PM CDT

## 2023-06-23 ENCOUNTER — PES CALL (OUTPATIENT)
Dept: ADMINISTRATIVE | Facility: CLINIC | Age: 83
End: 2023-06-23
Payer: MEDICARE

## 2023-06-23 RX ORDER — METOPROLOL SUCCINATE 25 MG/1
TABLET, EXTENDED RELEASE ORAL
Qty: 90 TABLET | Refills: 0 | OUTPATIENT
Start: 2023-06-23

## 2023-06-23 RX ORDER — ISOSORBIDE MONONITRATE 120 MG/1
TABLET, EXTENDED RELEASE ORAL
Qty: 90 TABLET | Refills: 0 | OUTPATIENT
Start: 2023-06-23

## 2023-06-23 NOTE — PROGRESS NOTES
.    [facility]  Infectious Diseases Progress Note      Giovanna Dhillon Patient Status:  Inpatient    1998 MRN [id number]   Location [facility] UNIT 52 Attending Katelyn Byrd MD   Hosp Day # 0 PCP Maryam Marie MD       ANTIMICROBIAL THERAPY:   Ceftriaxone -    Subjective   History of Present Illness:  Giovanna Dhillon is a a(n) 25 year old female.   Subjective:   PT reports pain is improved though still present  No fevers or chills  No n/v/d  Denies rashes.  No dysuria or increased urinary frequency    Allergies:  ALLERGIES:  No Known Allergies    Objective   Vital signs: Blood pressure 119/75, pulse 87, temperature 98.1 °F (36.7 °C), temperature source Oral, resp. rate 16, height 5' 9\" (1.753 m), weight 119.6 kg (263 lb 10.7 oz), last menstrual period 2023, SpO2 100 %, unknown if currently breastfeeding.  Temp (24hrs), Av °F (36.7 °C), Min:97.5 °F (36.4 °C), Max:98.1 °F (36.7 °C)       Obese, no acute distress  PERRL  Lungs clear bilateral  Abdomen soft nontender. No cva tenderness. Mild left LLQ pain with no rebound  No lower extremity edema.     Laboratory Data:  139 109 14 98   4.7 28 0.72      11.7 11.6 307    37.2      Lab Results   Component Value Date    LEUKA 1 to 5 2023    UNITR Negative 2023       Lab Results   Component Value Date    BLC No Growth <24 hours 2023    BLC No Growth <24 hours 2023       Imaging:  Results for orders placed or performed during the hospital encounter of 23 (from the past 48 hour(s))   CT ABDOMEN PELVIS W CONTRAST - IV contrast only    Impression     There is now significant perinephric stranding most apparent in the upper  pole of the left kidney with heterogeneous enhancement. This is adjacent to  a focal cystic lesion which has been present from 2022 CT although the  perinephric stranding and perinephric fluid is new. Consider infection.  Pyelonephritis. 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  INR needs to be less than 2.5 for procedure.  As pt was on the higher end of his range will reduce dosing prior to procedure per calendar & re-assess.      adjacent cyst.    Electronically Signed by: ROSALINDA PAGE M.D.   Signed on: 6/22/2023 12:29 PM   Workstation ID: [id number]   US KIDNEY BILATERAL    Impression    1.  3.8 cm cystic abnormality in the left kidney could be secondary to  hemorrhagic/proteinaceous cyst versus superinfected cyst/abscess.   Ultrasound alone would slightly favor hemorrhagic/proteinaceous cyst but  the earlier CT scan would favor superinfected cyst/abscess.  Pre and  postcontrast MRI abdomen is recommended which will more definitively  determine if there is infection of this cyst.    2.  Mild left perinephric infiltration keeping with pyelonephritis.    3.  Debris within the bladder and keeping with cystitis.    4.  Unremarkable right kidney.    Electronically Signed by: LEISA DUBOSE M.D.   Signed on: 6/22/2023 2:40 PM   Workstation ID: [id number]   MRI ABDOMEN W WO CONTRAST    Impression    1.  Findings suspicious for left-sided pyelonephritis with infected  cyst/abscesses and inflammatory changes.  2.  No hydronephrosis.  3.  Additional findings as described above.    Electronically Signed by: AMANDA CHADWICK M.D.   Signed on: 6/22/2023 7:01 PM   Workstation ID: [id number]       Assessment & Plan   ASSESSMENT:    25 year old woman with past medical history of morbid obesity and recurrent urinary tract infections who presents with several days of abdominal pain, malaise, dysuria, and increased urinary frequency, found to have mild leukocytosis and imaging findings consistent with left sided pyelonephritis complicated with what appears to be an infected renal cyst. She is being treated with Ceftriaxone. Prior urine culture with E. Coli though it was obtained several months ago. Patient appears to be stable.      #  Pyelonephritis  By imaging findings. No CVA tenderness on exam.   Blood cultures remain with no growth to date   Urinalysis was unimpressive.   Would continue treatment with Ceftriaxone   Presence of infected cyst will  likely need management by urology; although at this moment, treatment will likely be antibiotics alone.     # Infected renal cyst  Unknown etiology of cysts. If simple renal cyst, may consider excision if it results in frequent/recurrent infections. If this is first infection involving the cyst and the bacteria is susceptible to at least an agent with good microbicidal activity, it may be reasonable to attempt to sterilize the cyst.   Given that urinalysis is clean with evidence of infection surrounding cyst, should consider possibility of hematogenous source. However, blood cultures remain negative.         PLAN:  - continue on Ceftriaxone  - obtain urology consult for management of an infected renal cyst.  - continue to follow wbc count, fever curve  - follow microbiology, imaging studies        Alli Rutledge MD  [facility], [facility]  [phone]  6/23/2023  1:41 PM

## 2023-06-23 NOTE — TELEPHONE ENCOUNTER
Refill Decision Note   Dariel Urbina  is requesting a refill authorization.  Brief Assessment and Rationale for Refill:  Quick Discontinue     Medication Therapy Plan:      Pharmacy is requesting new scripts for the following medications without required information, (sig/ frequency/qty/etc)      Medication Reconciliation Completed: No     Comments: Pharmacies have been requesting medications for patients without required information, (sig, frequency, qty, etc.). In addition, requests are sent for medication(s) pt. are currently not taking, and medications patients have never taken.    We have spoken to the pharmacies about these request types and advised their teams previously that we are unable to assess these New Script requests and require all details for these requests. This is a known issue and has been reported.     Note composed:12:33 AM 06/23/2023

## 2023-06-26 RX ORDER — ISOSORBIDE MONONITRATE 60 MG/1
TABLET, EXTENDED RELEASE ORAL
Qty: 90 TABLET | Refills: 0 | OUTPATIENT
Start: 2023-06-26

## 2023-06-26 NOTE — TELEPHONE ENCOUNTER
No care due was identified.  [facility] Embedded Care Due Messages. Reference number: [id number].   6/26/2023 3:55:34 PM CDT

## 2023-06-28 ENCOUNTER — ANTI-COAG VISIT (OUTPATIENT)
Dept: CARDIOLOGY | Facility: CLINIC | Age: 83
End: 2023-06-28
Payer: MEDICARE

## 2023-06-28 ENCOUNTER — LAB VISIT (OUTPATIENT)
Dept: LAB | Facility: HOSPITAL | Age: 83
End: 2023-06-28
Attending: INTERNAL MEDICINE
Payer: MEDICARE

## 2023-06-28 DIAGNOSIS — Z95.2 H/O MECHANICAL AORTIC VALVE REPLACEMENT: ICD-10-CM

## 2023-06-28 DIAGNOSIS — Z79.01 ANTICOAGULANT LONG-TERM USE: Primary | ICD-10-CM

## 2023-06-28 DIAGNOSIS — Z79.01 ANTICOAGULANT LONG-TERM USE: ICD-10-CM

## 2023-06-28 DIAGNOSIS — D50.9 IRON DEFICIENCY ANEMIA, UNSPECIFIED IRON DEFICIENCY ANEMIA TYPE: ICD-10-CM

## 2023-06-28 LAB
FERRITIN SERPL-MCNC: 41 NG/ML (ref 20–300)
HGB BLD-MCNC: 9.2 G/DL (ref 14–18)
INR PPP: 2.5 (ref 0.8–1.2)
IRON SERPL-MCNC: 44 UG/DL (ref 45–160)
PROTHROMBIN TIME: 25 SEC (ref 9–12.5)
SATURATED IRON: 13 % (ref 20–50)
TOTAL IRON BINDING CAPACITY: 343 UG/DL (ref 250–450)
TRANSFERRIN SERPL-MCNC: 232 MG/DL (ref 200–375)

## 2023-06-28 PROCEDURE — 36415 COLL VENOUS BLD VENIPUNCTURE: CPT | Performed by: INTERNAL MEDICINE

## 2023-06-28 PROCEDURE — 93793 ANTICOAG MGMT PT WARFARIN: CPT | Mod: S$GLB,,,

## 2023-06-28 PROCEDURE — 85018 HEMOGLOBIN: CPT | Performed by: INTERNAL MEDICINE

## 2023-06-28 PROCEDURE — 84466 ASSAY OF TRANSFERRIN: CPT | Performed by: INTERNAL MEDICINE

## 2023-06-28 PROCEDURE — 82728 ASSAY OF FERRITIN: CPT | Performed by: INTERNAL MEDICINE

## 2023-06-28 PROCEDURE — 85610 PROTHROMBIN TIME: CPT | Performed by: INTERNAL MEDICINE

## 2023-06-28 PROCEDURE — 93793 PR ANTICOAGULANT MGMT FOR PT TAKING WARFARIN: ICD-10-PCS | Mod: S$GLB,,,

## 2023-06-29 RX ORDER — ISOSORBIDE MONONITRATE 60 MG/1
60 TABLET, EXTENDED RELEASE ORAL NIGHTLY
Qty: 90 TABLET | Refills: 3 | Status: SHIPPED | OUTPATIENT
Start: 2023-06-29

## 2023-06-29 NOTE — TELEPHONE ENCOUNTER
----- Message from Sonja Jovel sent at 6/29/2023  9:18 AM CDT -----  Contact: Ameena @ [phone]  Requesting an RX refill or new RX.  Is this a refill or new RX: New Rx  RX name and strength (copy/paste from chart): Isosorbide mononitrate (IMDUR) 60 MG 24 hr tablet  Is this a 30 day or 90 day RX: 90  Pharmacy name and phone # (copy/paste from chart):[facility]   Phone: [phone] Fax: [phone]  The doctors have asked that we provide their patients with the following 2 reminders -- prescription refills can take up to 72 hours, and a friendly reminder that in the future you can use your [facility] account to request refills:      Last filled by [facility].

## 2023-06-29 NOTE — TELEPHONE ENCOUNTER
No care due was identified.  [facility] Embedded Care Due Messages. Reference number: [id number].   6/29/2023 9:38:53 AM CDT

## 2023-07-02 ENCOUNTER — PATIENT MESSAGE (OUTPATIENT)
Dept: GASTROENTEROLOGY | Facility: CLINIC | Age: 83
End: 2023-07-02
Payer: MEDICARE

## 2023-07-02 DIAGNOSIS — D50.9 IRON DEFICIENCY ANEMIA, UNSPECIFIED IRON DEFICIENCY ANEMIA TYPE: ICD-10-CM

## 2023-07-02 RX ORDER — FERROUS GLUCONATE 324(38)MG
324 TABLET ORAL
Qty: 90 TABLET | Refills: 1 | Status: SHIPPED | OUTPATIENT
Start: 2023-07-02 | End: 2023-09-24 | Stop reason: SDUPTHER

## 2023-07-02 NOTE — PROGRESS NOTES
GI MA team - please tell patient that they are iron deficient and anemic and recommend that they take ferrous gluconate one 324mg pill once daily for next 3 months.    GI MA team -  Please order repeat fasting Hemoglobin, Iron/TIBC, and Ferritin in 8 weeks - Orders placed.     Dariel recommend EGD colonoscopy for further evaluation of your iron deficiency anemia

## 2023-07-03 ENCOUNTER — TELEPHONE (OUTPATIENT)
Dept: ENDOSCOPY | Facility: HOSPITAL | Age: 83
End: 2023-07-03
Payer: MEDICARE

## 2023-07-03 NOTE — TELEPHONE ENCOUNTER
"----- Message from Beti Cevallos sent at 7/3/2023  8:37 AM CDT -----    ----- Message -----  From: Sergei Ayon MD  Sent: 2023   9:37 AM CDT  To: [facility]    Procedure: EGD/Colonoscopy    Diagnosis: Iron deficiency anemia, diarrhea, history of advanced adenomas    Procedure Timin-4 weeks    #If within 4 weeks selected, please sherrell as high priority#    #If greater than 12 weeks, please select "5-12 weeks" and delay sending until 2 months prior to requested date#     Provider: Any endoscopist    Location: [address]    Additional Scheduling Information:  Patient on Coumadin, history of Syncope, Chronic renal impairment, unspecified CKD stage, Iron deficiency anemia, unspecified iron deficiency anemia type, and advanced age, atrial fib         Prep Specifications:Standard prep    Have you attached a patient to this message: yes         "

## 2023-07-03 NOTE — TELEPHONE ENCOUNTER
Contact patient to schedule his EGD/Colonoscopy procedure. No answer left message for patient to call the office back.

## 2023-07-05 ENCOUNTER — TELEPHONE (OUTPATIENT)
Dept: ENDOSCOPY | Facility: HOSPITAL | Age: 83
End: 2023-07-05
Payer: MEDICARE

## 2023-07-12 ENCOUNTER — LAB VISIT (OUTPATIENT)
Dept: LAB | Facility: HOSPITAL | Age: 83
End: 2023-07-12
Attending: INTERNAL MEDICINE
Payer: MEDICARE

## 2023-07-12 ENCOUNTER — ANTI-COAG VISIT (OUTPATIENT)
Dept: CARDIOLOGY | Facility: CLINIC | Age: 83
End: 2023-07-12
Payer: MEDICARE

## 2023-07-12 DIAGNOSIS — Z95.2 H/O MECHANICAL AORTIC VALVE REPLACEMENT: ICD-10-CM

## 2023-07-12 DIAGNOSIS — Z79.01 ANTICOAGULANT LONG-TERM USE: Primary | ICD-10-CM

## 2023-07-12 DIAGNOSIS — Z79.01 ANTICOAGULANT LONG-TERM USE: ICD-10-CM

## 2023-07-12 LAB
INR PPP: 2 (ref 0.8–1.2)
PROTHROMBIN TIME: 20.5 SEC (ref 9–12.5)

## 2023-07-12 PROCEDURE — 85610 PROTHROMBIN TIME: CPT | Mod: HCNC | Performed by: INTERNAL MEDICINE

## 2023-07-12 PROCEDURE — 93793 PR ANTICOAGULANT MGMT FOR PT TAKING WARFARIN: ICD-10-PCS | Mod: S$GLB,,,

## 2023-07-12 PROCEDURE — 36415 COLL VENOUS BLD VENIPUNCTURE: CPT | Mod: HCNC | Performed by: INTERNAL MEDICINE

## 2023-07-12 PROCEDURE — 93793 ANTICOAG MGMT PT WARFARIN: CPT | Mod: S$GLB,,,

## 2023-07-17 PROBLEM — N18.30 ACUTE RENAL FAILURE SUPERIMPOSED ON STAGE 3 CHRONIC KIDNEY DISEASE: Status: RESOLVED | Noted: 2018-03-19 | Resolved: 2023-07-17

## 2023-07-17 PROBLEM — N17.9 ACUTE RENAL FAILURE SUPERIMPOSED ON STAGE 3 CHRONIC KIDNEY DISEASE: Status: RESOLVED | Noted: 2018-03-19 | Resolved: 2023-07-17

## 2023-07-18 ENCOUNTER — OFFICE VISIT (OUTPATIENT)
Dept: OTOLARYNGOLOGY | Facility: CLINIC | Age: 83
End: 2023-07-18
Payer: MEDICARE

## 2023-07-18 DIAGNOSIS — N18.32 STAGE 3B CHRONIC KIDNEY DISEASE: ICD-10-CM

## 2023-07-18 DIAGNOSIS — R04.0 RECURRENT EPISTAXIS: Primary | ICD-10-CM

## 2023-07-18 DIAGNOSIS — Z79.01 ANTICOAGULANT LONG-TERM USE: ICD-10-CM

## 2023-07-18 PROCEDURE — 1126F PR PAIN SEVERITY QUANTIFIED, NO PAIN PRESENT: ICD-10-PCS | Mod: HCNC,CPTII,S$GLB, | Performed by: STUDENT IN AN ORGANIZED HEALTH CARE EDUCATION/TRAINING PROGRAM

## 2023-07-18 PROCEDURE — 3072F LOW RISK FOR RETINOPATHY: CPT | Mod: HCNC,CPTII,S$GLB, | Performed by: STUDENT IN AN ORGANIZED HEALTH CARE EDUCATION/TRAINING PROGRAM

## 2023-07-18 PROCEDURE — 3072F PR LOW RISK FOR RETINOPATHY: ICD-10-PCS | Mod: HCNC,CPTII,S$GLB, | Performed by: STUDENT IN AN ORGANIZED HEALTH CARE EDUCATION/TRAINING PROGRAM

## 2023-07-18 PROCEDURE — 1126F AMNT PAIN NOTED NONE PRSNT: CPT | Mod: HCNC,CPTII,S$GLB, | Performed by: STUDENT IN AN ORGANIZED HEALTH CARE EDUCATION/TRAINING PROGRAM

## 2023-07-18 PROCEDURE — 31237 PR NASAL/SINUS ENDOSCOPY,BX/RMV POLYP/DEBRID: ICD-10-PCS | Mod: 50,HCNC,S$GLB, | Performed by: STUDENT IN AN ORGANIZED HEALTH CARE EDUCATION/TRAINING PROGRAM

## 2023-07-18 PROCEDURE — 99999 PR PBB SHADOW E&M-EST. PATIENT-LVL I: ICD-10-PCS | Mod: PBBFAC,HCNC,, | Performed by: STUDENT IN AN ORGANIZED HEALTH CARE EDUCATION/TRAINING PROGRAM

## 2023-07-18 PROCEDURE — 99213 OFFICE O/P EST LOW 20 MIN: CPT | Mod: 25,HCNC,S$GLB, | Performed by: STUDENT IN AN ORGANIZED HEALTH CARE EDUCATION/TRAINING PROGRAM

## 2023-07-18 PROCEDURE — 99999 PR PBB SHADOW E&M-EST. PATIENT-LVL I: CPT | Mod: PBBFAC,HCNC,, | Performed by: STUDENT IN AN ORGANIZED HEALTH CARE EDUCATION/TRAINING PROGRAM

## 2023-07-18 PROCEDURE — 99213 PR OFFICE/OUTPT VISIT, EST, LEVL III, 20-29 MIN: ICD-10-PCS | Mod: 25,HCNC,S$GLB, | Performed by: STUDENT IN AN ORGANIZED HEALTH CARE EDUCATION/TRAINING PROGRAM

## 2023-07-18 PROCEDURE — 31237 NSL/SINS NDSC SURG BX POLYPC: CPT | Mod: 50,HCNC,S$GLB, | Performed by: STUDENT IN AN ORGANIZED HEALTH CARE EDUCATION/TRAINING PROGRAM

## 2023-07-18 NOTE — PROGRESS NOTES
Subjective:      Dariel is a 82 y.o. male who comes for follow-up of  epistaxis . He underwent bilateral SPA ligation on 6/14/23. Doing well. No large amounts of bleeding. Has been having some large crusting come out of his nose. Breathing well.     His current sinus regime consists of: Saline irrigations.     The assessment of quality and severity of symptoms as measured by the SNOT-22 score was deferred.    The patient's medications, allergies, past medical, surgical, social and family histories were reviewed and updated as appropriate.    A detailed review of systems was obtained with pertinent positives as per the above HPI, and otherwise negative.        Objective:     There were no vitals taken for this visit.       Constitutional:   Vital signs are normal. He appears well-developed and well-nourished.     Head:  Normocephalic and atraumatic.     Ears:  Hearing normal to normal and whispered voice; external ear normal without scars, lesions, or masses; ear canal, tympanic membrane, and middle ear normal..   Right Ear: No swelling. Tympanic membrane is not perforated and not bulging. No middle ear effusion.   Left Ear: No swelling. Tympanic membrane is not perforated and not bulging.  No middle ear effusion.     Nose:  Nose normal including turbinates, nasal mucosa, sinuses and nasal septum. No epistaxis.     Mouth/Throat  Oropharynx clear and moist without lesions or asymmetry and normal uvula midline. Normal dentition. No tonsillar abscesses. Tonsillar exudate.      Neck:  Neck normal without thyromegaly masses, asymmetry, normal tracheal structure, crepitus, and tenderness, thyroid normal, trachea normal, phonation normal, full range of motion with neck supple and no adenopathy. No stridor present.        Head (right side): No submental adenopathy present.        Head (left side): No submental adenopathy present.     He has no cervical adenopathy.     Pulmonary/Chest:   No stridor.     Procedure    Nasal  endoscopy with debridement performed.  See procedure note.    Nasal Endoscopy with Debridement    Nasal endoscopy and debridement were performed in accordance with the 0 day global period for endoscopic sinus surgery and are unrelated to any other recently performed procedures.     Topical Agents: Topical 0.25% phenylephrine and 4% lidocaine    A 0 degree rigid nasal endoscope was inserted into the nose to visualize the nasal passageway and paranasal sinuses. Under endoscopic visualization, a combination of instruments including suction and grasping forceps were used to debride crust, debris, inflammatory tissue and nasal polyps from the nasal cavity, paranasal sinuses and sinus drainage pathways. This was performed to aid in healing and optimize the patency and function of the sinus cavities and nasal passageways. This is necessary to avoid scar formation, infection, and mucocele formation. In addition, this facilitates in the optimal instillation of topical therapies, saline irrigations, long-term disease surveillance, and endoscopically-derived cultures. The endoscope was withdrawn without sequelae. The procedure was well tolerated by the patient.    The underlying sinus cavities were healing very well. Bilateral SPA ligation well healed. Some significant crusting in the left middle meatus. Left septum mucosa healing well.       Data Reviewed    WBC (K/uL)   Date Value   06/14/2023 5.22     Eosinophil % (%)   Date Value   06/14/2023 2.1     Eos # (K/uL)   Date Value   06/14/2023 0.1     Platelets (K/uL)   Date Value   06/14/2023 158     Glucose (mg/dL)   Date Value   04/18/2023 100     No results found for: IGE    No sinus imaging available.      Assessment:     1. Recurrent epistaxis    2. Anticoagulant long-term use    3. Stage 3b chronic kidney disease      Plan:     - Cont saline  - Afrin PRN for bleeding  - RTC 1 month    Jono Russell MD

## 2023-07-25 ENCOUNTER — TELEPHONE (OUTPATIENT)
Dept: ADMINISTRATIVE | Facility: CLINIC | Age: 83
End: 2023-07-25
Payer: MEDICARE

## 2023-07-26 ENCOUNTER — OFFICE VISIT (OUTPATIENT)
Dept: INTERNAL MEDICINE | Facility: CLINIC | Age: 83
End: 2023-07-26
Payer: MEDICARE

## 2023-07-26 VITALS
BODY MASS INDEX: 29.59 KG/M2 | DIASTOLIC BLOOD PRESSURE: 54 MMHG | HEIGHT: 64 IN | OXYGEN SATURATION: 98 % | SYSTOLIC BLOOD PRESSURE: 110 MMHG | WEIGHT: 173.31 LBS | HEART RATE: 53 BPM

## 2023-07-26 DIAGNOSIS — I77.9 BILATERAL CAROTID ARTERY DISEASE, UNSPECIFIED TYPE: ICD-10-CM

## 2023-07-26 DIAGNOSIS — I15.2 HYPERTENSION ASSOCIATED WITH DIABETES: ICD-10-CM

## 2023-07-26 DIAGNOSIS — Z95.2 H/O MECHANICAL AORTIC VALVE REPLACEMENT: ICD-10-CM

## 2023-07-26 DIAGNOSIS — Z87.39 HISTORY OF GOUT: ICD-10-CM

## 2023-07-26 DIAGNOSIS — E11.51 TYPE 2 DIABETES MELLITUS WITH DIABETIC PERIPHERAL ANGIOPATHY WITHOUT GANGRENE, WITHOUT LONG-TERM CURRENT USE OF INSULIN: ICD-10-CM

## 2023-07-26 DIAGNOSIS — N18.4 CHRONIC KIDNEY DISEASE (CKD), STAGE IV (SEVERE): ICD-10-CM

## 2023-07-26 DIAGNOSIS — I48.0 PAROXYSMAL ATRIAL FIBRILLATION: ICD-10-CM

## 2023-07-26 DIAGNOSIS — I15.0 RENOVASCULAR HYPERTENSION: ICD-10-CM

## 2023-07-26 DIAGNOSIS — Z79.01 ANTICOAGULANT LONG-TERM USE: ICD-10-CM

## 2023-07-26 DIAGNOSIS — E11.69 HYPERLIPIDEMIA ASSOCIATED WITH TYPE 2 DIABETES MELLITUS: ICD-10-CM

## 2023-07-26 DIAGNOSIS — I50.42 CHRONIC COMBINED SYSTOLIC AND DIASTOLIC HEART FAILURE: ICD-10-CM

## 2023-07-26 DIAGNOSIS — I25.10 ATHEROSCLEROSIS OF NATIVE CORONARY ARTERY OF NATIVE HEART WITHOUT ANGINA PECTORIS: ICD-10-CM

## 2023-07-26 DIAGNOSIS — R63.4 WEIGHT LOSS, ABNORMAL: ICD-10-CM

## 2023-07-26 DIAGNOSIS — I25.10 CORONARY ARTERY DISEASE INVOLVING NATIVE CORONARY ARTERY OF NATIVE HEART WITHOUT ANGINA PECTORIS: ICD-10-CM

## 2023-07-26 DIAGNOSIS — Z00.00 ENCOUNTER FOR MEDICARE ANNUAL WELLNESS EXAM: ICD-10-CM

## 2023-07-26 DIAGNOSIS — R26.9 ABNORMALITY OF GAIT AND MOBILITY: ICD-10-CM

## 2023-07-26 DIAGNOSIS — I70.0 AORTIC CALCIFICATION: ICD-10-CM

## 2023-07-26 DIAGNOSIS — E87.20 METABOLIC ACIDOSIS WITH NORMAL ANION GAP AND BICARBONATE LOSSES: ICD-10-CM

## 2023-07-26 DIAGNOSIS — I70.219 ATHEROSCLEROSIS OF NATIVE ARTERY OF LOWER EXTREMITY WITH INTERMITTENT CLAUDICATION, UNSPECIFIED LATERALITY: ICD-10-CM

## 2023-07-26 DIAGNOSIS — E11.59 HYPERTENSION ASSOCIATED WITH DIABETES: ICD-10-CM

## 2023-07-26 DIAGNOSIS — E78.5 HYPERLIPIDEMIA ASSOCIATED WITH TYPE 2 DIABETES MELLITUS: ICD-10-CM

## 2023-07-26 DIAGNOSIS — D69.2 PURPURA: ICD-10-CM

## 2023-07-26 DIAGNOSIS — I27.9 PULMONARY HEART DISEASE: ICD-10-CM

## 2023-07-26 DIAGNOSIS — Z00.00 ENCOUNTER FOR PREVENTIVE HEALTH EXAMINATION: Primary | ICD-10-CM

## 2023-07-26 PROBLEM — R19.7 DIARRHEA: Status: RESOLVED | Noted: 2017-03-31 | Resolved: 2023-07-26

## 2023-07-26 PROBLEM — R29.898 UPPER EXTREMITY WEAKNESS: Status: RESOLVED | Noted: 2019-09-25 | Resolved: 2023-07-26

## 2023-07-26 PROBLEM — R53.1 WEAKNESS: Status: RESOLVED | Noted: 2022-02-03 | Resolved: 2023-07-26

## 2023-07-26 PROBLEM — I50.9 ACUTE DECOMPENSATED HEART FAILURE: Status: RESOLVED | Noted: 2022-12-30 | Resolved: 2023-07-26

## 2023-07-26 PROCEDURE — 3078F DIAST BP <80 MM HG: CPT | Mod: HCNC,CPTII,S$GLB, | Performed by: NURSE PRACTITIONER

## 2023-07-26 PROCEDURE — G0439 PR MEDICARE ANNUAL WELLNESS SUBSEQUENT VISIT: ICD-10-PCS | Mod: HCNC,S$GLB,, | Performed by: NURSE PRACTITIONER

## 2023-07-26 PROCEDURE — 1170F FXNL STATUS ASSESSED: CPT | Mod: HCNC,CPTII,S$GLB, | Performed by: NURSE PRACTITIONER

## 2023-07-26 PROCEDURE — 1159F MED LIST DOCD IN RCRD: CPT | Mod: HCNC,CPTII,S$GLB, | Performed by: NURSE PRACTITIONER

## 2023-07-26 PROCEDURE — 1160F PR REVIEW ALL MEDS BY PRESCRIBER/CLIN PHARMACIST DOCUMENTED: ICD-10-PCS | Mod: HCNC,CPTII,S$GLB, | Performed by: NURSE PRACTITIONER

## 2023-07-26 PROCEDURE — 1125F PR PAIN SEVERITY QUANTIFIED, PAIN PRESENT: ICD-10-PCS | Mod: HCNC,CPTII,S$GLB, | Performed by: NURSE PRACTITIONER

## 2023-07-26 PROCEDURE — 99999 PR PBB SHADOW E&M-EST. PATIENT-LVL V: CPT | Mod: PBBFAC,HCNC,, | Performed by: NURSE PRACTITIONER

## 2023-07-26 PROCEDURE — 99999 PR PBB SHADOW E&M-EST. PATIENT-LVL V: ICD-10-PCS | Mod: PBBFAC,HCNC,, | Performed by: NURSE PRACTITIONER

## 2023-07-26 PROCEDURE — 3078F PR MOST RECENT DIASTOLIC BLOOD PRESSURE < 80 MM HG: ICD-10-PCS | Mod: HCNC,CPTII,S$GLB, | Performed by: NURSE PRACTITIONER

## 2023-07-26 PROCEDURE — 3074F SYST BP LT 130 MM HG: CPT | Mod: HCNC,CPTII,S$GLB, | Performed by: NURSE PRACTITIONER

## 2023-07-26 PROCEDURE — 3074F PR MOST RECENT SYSTOLIC BLOOD PRESSURE < 130 MM HG: ICD-10-PCS | Mod: HCNC,CPTII,S$GLB, | Performed by: NURSE PRACTITIONER

## 2023-07-26 PROCEDURE — 3072F LOW RISK FOR RETINOPATHY: CPT | Mod: HCNC,CPTII,S$GLB, | Performed by: NURSE PRACTITIONER

## 2023-07-26 PROCEDURE — 1159F PR MEDICATION LIST DOCUMENTED IN MEDICAL RECORD: ICD-10-PCS | Mod: HCNC,CPTII,S$GLB, | Performed by: NURSE PRACTITIONER

## 2023-07-26 PROCEDURE — 1125F AMNT PAIN NOTED PAIN PRSNT: CPT | Mod: HCNC,CPTII,S$GLB, | Performed by: NURSE PRACTITIONER

## 2023-07-26 PROCEDURE — 1101F PT FALLS ASSESS-DOCD LE1/YR: CPT | Mod: HCNC,CPTII,S$GLB, | Performed by: NURSE PRACTITIONER

## 2023-07-26 PROCEDURE — G0439 PPPS, SUBSEQ VISIT: HCPCS | Mod: HCNC,S$GLB,, | Performed by: NURSE PRACTITIONER

## 2023-07-26 PROCEDURE — 3288F PR FALLS RISK ASSESSMENT DOCUMENTED: ICD-10-PCS | Mod: HCNC,CPTII,S$GLB, | Performed by: NURSE PRACTITIONER

## 2023-07-26 PROCEDURE — 1170F PR FUNCTIONAL STATUS ASSESSED: ICD-10-PCS | Mod: HCNC,CPTII,S$GLB, | Performed by: NURSE PRACTITIONER

## 2023-07-26 PROCEDURE — 1160F RVW MEDS BY RX/DR IN RCRD: CPT | Mod: HCNC,CPTII,S$GLB, | Performed by: NURSE PRACTITIONER

## 2023-07-26 PROCEDURE — 3072F PR LOW RISK FOR RETINOPATHY: ICD-10-PCS | Mod: HCNC,CPTII,S$GLB, | Performed by: NURSE PRACTITIONER

## 2023-07-26 PROCEDURE — 3288F FALL RISK ASSESSMENT DOCD: CPT | Mod: HCNC,CPTII,S$GLB, | Performed by: NURSE PRACTITIONER

## 2023-07-26 PROCEDURE — 1101F PR PT FALLS ASSESS DOC 0-1 FALLS W/OUT INJ PAST YR: ICD-10-PCS | Mod: HCNC,CPTII,S$GLB, | Performed by: NURSE PRACTITIONER

## 2023-07-26 NOTE — PATIENT INSTRUCTIONS
Counseling and Referral of Other Preventative  (Italic type indicates deductible and co-insurance are waived)    Patient Name: Dariel Urbina  Today's Date: 7/26/2023    Health Maintenance       Date Due Completion Date    COVID-19 Vaccine (5 - Pfizer series) 07/05/2022 5/10/2022    Hemoglobin A1c 08/01/2023 2/1/2023    Influenza Vaccine (1) 09/01/2023 10/28/2022    Override on 10/31/2019: Done ([facility])    Override on 12/13/2017: Done (outside)    Override on 2/9/2017: Declined    Eye Exam 09/06/2023 9/6/2022    Override on 6/20/2022: Done    Override on 8/2/2019: Done    Diabetes Urine Screening 11/14/2023 11/14/2022    Lipid Panel 02/28/2024 2/28/2023    TETANUS VACCINE 09/29/2032 9/29/2022        No orders of the defined types were placed in this encounter.      The following information is provided to all patients.  This information is to help you find resources for any of the problems found today that may be affecting your health:                Living healthy guide: www.[facility].[place].gov      Understanding Diabetes: www.diabetes.org      Eating healthy: www.cdc.gov/healthyweight      CDC home safety checklist: www.cdc.gov/steadi/patient.html      Agency on Aging: www.goea.[place].[facility]      Alcoholics anonymous (AA): www.aa.org      Physical Activity: www.ra.nih.gov/xa8ytdw      Tobacco use: www.quitwithusla.org

## 2023-07-26 NOTE — PROGRESS NOTES
"Dariel Urbina presented for a  Medicare AWV and comprehensive Health Risk Assessment today. The following components were reviewed and updated:    Medical history  Family History  Social history  Allergies and Current Medications  Health Risk Assessment  Health Maintenance  Care Team         ** See Completed Assessments for Annual Wellness Visit within the encounter summary.**         The following assessments were completed:  Living Situation  CAGE  Depression Screening  Timed Get Up and Go  Whisper Test  Cognitive Function Screening      Nutrition Screening  ADL Screening  PAQ Screening  OPIOID Screening: Patient does not have a prescription for narcotics. Patient does not use substance         Vitals:    07/26/23 0808 07/26/23 0843   BP: (!) 150/68 (!) 110/54   BP Location: Left arm Right arm   Pulse: (!) 53    SpO2: 98%    Weight: 78.6 kg (173 lb 4.5 oz)    Height: 5' 4" (1.626 m)      Body mass index is 29.74 kg/m².  Physical Exam  Vitals and nursing note reviewed.   Constitutional:       Appearance: He is well-developed.   HENT:      Head: Normocephalic.   Cardiovascular:      Rate and Rhythm: Normal rate and regular rhythm.      Heart sounds: Normal heart sounds. No murmur heard.  Pulmonary:      Effort: Pulmonary effort is normal.      Breath sounds: Normal breath sounds.   Abdominal:      General: Bowel sounds are normal.      Palpations: Abdomen is soft.   Musculoskeletal:         General: Normal range of motion.   Skin:     General: Skin is warm and dry.   Neurological:      Mental Status: He is alert and oriented to person, place, and time.      Motor: No abnormal muscle tone.   Psychiatric:         Mood and Affect: Mood normal.             Diagnoses and health risks identified today and associated recommendations/orders:    1. Encounter for preventive health examination  Here for Health Risk Assessment/Annual Wellness Visit.  Health maintenance reviewed and updated. Follow up in one year.     2. " Renovascular hypertension  Chronic, stable on current medications. Followed by PCP, Nephrology..     3. Hypertension associated with diabetes  Chronic, stable on current medications. Followed by PCP, Cardiology.    4. Aortic calcification  Chronic, stable on current medications. Noted CT Abdomen/Pelvis 5/06/23. Followed by PCP, Cardiology.    5. Paroxysmal atrial fibrillation  Chronic, stable on current medications. Followed by PCP, Cardiology.    6. Coronary artery disease involving native coronary artery of native heart without angina pectoris  Chronic, stable on current medications. Followed by PCP, Cardiology.    7. Atherosclerosis of native coronary artery of native heart without angina pectoris  Chronic, stable on current medications. Followed by PCP, Cardiology.    8. Chronic combined systolic and diastolic heart failure  Chronic, stable on current medications. Followed by PCP, Cardiology.    9. Bilateral carotid artery disease, unspecified type  Chronic, stable on current medications. Followed by PCP, Cardiology.    10. Atherosclerosis of native artery of lower extremity with intermittent claudication, unspecified laterality  Chronic, stable on current medications. Followed by PCP, Cardiology.    11. Pulmonary heart disease  Chronic, stable on current medications. Estimated PA Systolic pressure 62 noted on TTE 12/30/22. Followed by PCP, Cardiology.    12. H/O mechanical aortic valve replacement  Followed by Cardiology.    13. Anticoagulant long-term use  Chronic warfarin. Followed by [facility], PCP, Cardiology.    14. Hyperlipidemia associated with type 2 diabetes mellitus  Chronic, stable on current medications. Followed by PCP, Cardiology.    15. Purpura        16. Type 2 diabetes mellitus with diabetic peripheral angiopathy without gangrene, without long-term current use of insulin  Chronic, stable on current medication. Last A1c 5.2.  Followed by PCP.     17. Chronic kidney disease (CKD), stage IV  (severe)  Chronic, stable on current medications. Followed by PCP, Nephrology.    18. Metabolic acidosis with normal anion gap and bicarbonate losses  Chronic, stable on current medications. Followed by PCP, Nephrology.    19. History of gout  Stable on current medication. Followed by PCP.    20. Weight loss, abnormal  Weight stable since 10/23. Reports dental work that affected intake. Followed by PCP.    21. Abnormality of gait and mobility  Chronic, stable. No reported falls, no assistive device for ambulation. Followed by PCP.    22. Encounter for Medicare annual wellness exam  - Ambulatory Referral/Consult to Enhanced Annual Wellness Visit (eAWV)      Provided Dariel with a 5-10 year written screening schedule and personal prevention plan. Recommendations were developed using the USPSTF age appropriate recommendations. Education, counseling, and referrals were provided as needed. After Visit Summary printed and given to patient which includes a list of additional screenings\tests needed.    Follow up in 4 months (on 11/14/2023).with PCP    Aga Sahu NP

## 2023-07-28 ENCOUNTER — PATIENT MESSAGE (OUTPATIENT)
Dept: CARDIOLOGY | Facility: CLINIC | Age: 83
End: 2023-07-28
Payer: MEDICARE

## 2023-08-02 ENCOUNTER — ANTI-COAG VISIT (OUTPATIENT)
Dept: CARDIOLOGY | Facility: CLINIC | Age: 83
End: 2023-08-02
Payer: MEDICARE

## 2023-08-02 ENCOUNTER — LAB VISIT (OUTPATIENT)
Dept: LAB | Facility: HOSPITAL | Age: 83
End: 2023-08-02
Attending: INTERNAL MEDICINE
Payer: MEDICARE

## 2023-08-02 DIAGNOSIS — Z79.01 ANTICOAGULANT LONG-TERM USE: Primary | ICD-10-CM

## 2023-08-02 DIAGNOSIS — Z79.01 ANTICOAGULANT LONG-TERM USE: ICD-10-CM

## 2023-08-02 DIAGNOSIS — Z95.2 H/O MECHANICAL AORTIC VALVE REPLACEMENT: ICD-10-CM

## 2023-08-02 LAB
INR PPP: 3.3 (ref 0.8–1.2)
PROTHROMBIN TIME: 32.8 SEC (ref 9–12.5)

## 2023-08-02 PROCEDURE — 85610 PROTHROMBIN TIME: CPT | Mod: HCNC | Performed by: INTERNAL MEDICINE

## 2023-08-02 PROCEDURE — 93793 ANTICOAG MGMT PT WARFARIN: CPT | Mod: S$GLB,,,

## 2023-08-02 PROCEDURE — 36415 COLL VENOUS BLD VENIPUNCTURE: CPT | Mod: HCNC | Performed by: INTERNAL MEDICINE

## 2023-08-02 PROCEDURE — 93793 PR ANTICOAGULANT MGMT FOR PT TAKING WARFARIN: ICD-10-PCS | Mod: S$GLB,,,

## 2023-08-02 NOTE — PROGRESS NOTES
[facility]    08/02/2023 9:28 AM    Assessment/Plan:    Patient presents today with supratherapeutic INR.    Assessment of patient findings and chart review: Pt's change in health/diarrhea could have contributed to his slightly supratherapeutic INR.     Recommendation for patient's warfarin regimen: slightly decrease his dose today, then resume maintenance dose.     Recommend repeat INR in 3 weeks  _________________________________________________________________    Dariel Devon Urbina (82 y.o.) is followed by the [facility].    Anticoagulation Summary  As of 8/2/2023      INR goal:  2.0-3.0   TTR:  68.9 % (11 y)   INR used for dosing:  3.3 (8/2/2023)   Warfarin maintenance plan:  7.5 mg (5 mg x 1.5) every Tue, Sat; 5 mg (5 mg x 1) all other days   Weekly warfarin total:  40 mg   Plan last modified:  Laurie Patino, PharmD (5/31/2023)   Next INR check:  8/23/2023   Target end date:      Indications    Anticoagulant long-term use [Z79.01]  H/O mechanical aortic valve replacement [Z95.2]                 Anticoagulation Episode Summary       INR check location:  Clinic Lab    Preferred lab:      Send INR reminders to:  [facility] COUMADIN MONITORING POOL    Comments:  [facility] - [facility] only Mon - Thu // carpel tunnel release 5/19 - target low end of range          Anticoagulation Care Providers       Provider Role Specialty Phone number    Devon Garnica MD [facility] [phone]            Patient Findings       Positives:  Change in health    Negatives:  Signs/symptoms of thrombosis, Signs/symptoms of bleeding, Laboratory test error suspected, Change in alcohol use, Change in activity, Upcoming invasive procedure, Emergency department visit, Upcoming dental procedure, Missed doses, Extra doses, Change in medications, Change in diet/appetite, Hospital admission, Bruising, Other complaints    Comments:  8/2/23  dose reviewed, pt ate a whole watermelon and had diarrhea a couple of days ago. Pt no longer has the diarrhea per Ameena.

## 2023-08-14 RX ORDER — BUMETANIDE 1 MG/1
1 TABLET ORAL EVERY OTHER DAY
Qty: 45 TABLET | Refills: 3 | Status: SHIPPED | OUTPATIENT
Start: 2023-08-14 | End: 2024-08-13

## 2023-08-14 RX ORDER — WARFARIN SODIUM 5 MG/1
TABLET ORAL
Qty: 30 TABLET | Refills: 11
Start: 2023-08-14 | End: 2023-09-14 | Stop reason: SDUPTHER

## 2023-08-14 RX ORDER — METOPROLOL SUCCINATE 25 MG/1
25 TABLET, EXTENDED RELEASE ORAL DAILY
Qty: 90 TABLET | Refills: 3 | Status: SHIPPED | OUTPATIENT
Start: 2023-08-14

## 2023-08-14 NOTE — TELEPHONE ENCOUNTER
No care due was identified.  [facility] Embedded Care Due Messages. Reference number: [id number].   8/14/2023 8:24:59 AM CDT

## 2023-08-14 NOTE — TELEPHONE ENCOUNTER
----- Message from Desi Gray sent at 8/14/2023  8:10 AM CDT -----  Contact: Parish [phone]  Requesting an RX refill or new RX.    Is this a refill or new RX: --Refill    RX name and strength (copy/paste from chart):    1.warfarin (COUMADIN) 5 MG tablet  2.bumetanide (BUMEX) 1 MG tablet  3.metoprolol succinate (TOPROL-XL) 25 MG 24 hr tablet      Is this a 30 day or 90 day RX: --90-days--    Pharmacy name and phone # (copy/paste from chart):    [facility] - [place], [place] - [address]  [address] [address]  Phone: [phone] Fax: [phone]

## 2023-08-15 ENCOUNTER — OFFICE VISIT (OUTPATIENT)
Dept: OTOLARYNGOLOGY | Facility: CLINIC | Age: 83
End: 2023-08-15
Payer: MEDICARE

## 2023-08-15 VITALS
HEIGHT: 64 IN | SYSTOLIC BLOOD PRESSURE: 168 MMHG | DIASTOLIC BLOOD PRESSURE: 72 MMHG | WEIGHT: 172.19 LBS | BODY MASS INDEX: 29.4 KG/M2 | HEART RATE: 54 BPM

## 2023-08-15 DIAGNOSIS — R04.0 RECURRENT EPISTAXIS: Primary | ICD-10-CM

## 2023-08-15 DIAGNOSIS — R04.0 ACUTE ANTERIOR EPISTAXIS: ICD-10-CM

## 2023-08-15 DIAGNOSIS — Z79.01 ANTICOAGULANT LONG-TERM USE: ICD-10-CM

## 2023-08-15 DIAGNOSIS — N18.32 STAGE 3B CHRONIC KIDNEY DISEASE: ICD-10-CM

## 2023-08-15 PROCEDURE — 1159F MED LIST DOCD IN RCRD: CPT | Mod: HCNC,CPTII,S$GLB, | Performed by: STUDENT IN AN ORGANIZED HEALTH CARE EDUCATION/TRAINING PROGRAM

## 2023-08-15 PROCEDURE — 3077F PR MOST RECENT SYSTOLIC BLOOD PRESSURE >= 140 MM HG: ICD-10-PCS | Mod: HCNC,CPTII,S$GLB, | Performed by: STUDENT IN AN ORGANIZED HEALTH CARE EDUCATION/TRAINING PROGRAM

## 2023-08-15 PROCEDURE — 99999 PR PBB SHADOW E&M-EST. PATIENT-LVL III: ICD-10-PCS | Mod: PBBFAC,HCNC,, | Performed by: STUDENT IN AN ORGANIZED HEALTH CARE EDUCATION/TRAINING PROGRAM

## 2023-08-15 PROCEDURE — 1160F PR REVIEW ALL MEDS BY PRESCRIBER/CLIN PHARMACIST DOCUMENTED: ICD-10-PCS | Mod: HCNC,CPTII,S$GLB, | Performed by: STUDENT IN AN ORGANIZED HEALTH CARE EDUCATION/TRAINING PROGRAM

## 2023-08-15 PROCEDURE — 3072F LOW RISK FOR RETINOPATHY: CPT | Mod: HCNC,CPTII,S$GLB, | Performed by: STUDENT IN AN ORGANIZED HEALTH CARE EDUCATION/TRAINING PROGRAM

## 2023-08-15 PROCEDURE — 1160F RVW MEDS BY RX/DR IN RCRD: CPT | Mod: HCNC,CPTII,S$GLB, | Performed by: STUDENT IN AN ORGANIZED HEALTH CARE EDUCATION/TRAINING PROGRAM

## 2023-08-15 PROCEDURE — 1126F AMNT PAIN NOTED NONE PRSNT: CPT | Mod: HCNC,CPTII,S$GLB, | Performed by: STUDENT IN AN ORGANIZED HEALTH CARE EDUCATION/TRAINING PROGRAM

## 2023-08-15 PROCEDURE — 99213 OFFICE O/P EST LOW 20 MIN: CPT | Mod: 25,HCNC,S$GLB, | Performed by: STUDENT IN AN ORGANIZED HEALTH CARE EDUCATION/TRAINING PROGRAM

## 2023-08-15 PROCEDURE — 1126F PR PAIN SEVERITY QUANTIFIED, NO PAIN PRESENT: ICD-10-PCS | Mod: HCNC,CPTII,S$GLB, | Performed by: STUDENT IN AN ORGANIZED HEALTH CARE EDUCATION/TRAINING PROGRAM

## 2023-08-15 PROCEDURE — 3077F SYST BP >= 140 MM HG: CPT | Mod: HCNC,CPTII,S$GLB, | Performed by: STUDENT IN AN ORGANIZED HEALTH CARE EDUCATION/TRAINING PROGRAM

## 2023-08-15 PROCEDURE — 3072F PR LOW RISK FOR RETINOPATHY: ICD-10-PCS | Mod: HCNC,CPTII,S$GLB, | Performed by: STUDENT IN AN ORGANIZED HEALTH CARE EDUCATION/TRAINING PROGRAM

## 2023-08-15 PROCEDURE — 3078F DIAST BP <80 MM HG: CPT | Mod: HCNC,CPTII,S$GLB, | Performed by: STUDENT IN AN ORGANIZED HEALTH CARE EDUCATION/TRAINING PROGRAM

## 2023-08-15 PROCEDURE — 1159F PR MEDICATION LIST DOCUMENTED IN MEDICAL RECORD: ICD-10-PCS | Mod: HCNC,CPTII,S$GLB, | Performed by: STUDENT IN AN ORGANIZED HEALTH CARE EDUCATION/TRAINING PROGRAM

## 2023-08-15 PROCEDURE — 31238 NSL/SINS NDSC SRG NSL HEMRRG: CPT | Mod: HCNC,LT,S$GLB, | Performed by: STUDENT IN AN ORGANIZED HEALTH CARE EDUCATION/TRAINING PROGRAM

## 2023-08-15 PROCEDURE — 99999 PR PBB SHADOW E&M-EST. PATIENT-LVL III: CPT | Mod: PBBFAC,HCNC,, | Performed by: STUDENT IN AN ORGANIZED HEALTH CARE EDUCATION/TRAINING PROGRAM

## 2023-08-15 PROCEDURE — 99213 PR OFFICE/OUTPT VISIT, EST, LEVL III, 20-29 MIN: ICD-10-PCS | Mod: 25,HCNC,S$GLB, | Performed by: STUDENT IN AN ORGANIZED HEALTH CARE EDUCATION/TRAINING PROGRAM

## 2023-08-15 PROCEDURE — 31238 PR NASAL/SINUS ENDOSCOPY,W/CONTROL NASAL HEM: ICD-10-PCS | Mod: HCNC,LT,S$GLB, | Performed by: STUDENT IN AN ORGANIZED HEALTH CARE EDUCATION/TRAINING PROGRAM

## 2023-08-15 PROCEDURE — 3078F PR MOST RECENT DIASTOLIC BLOOD PRESSURE < 80 MM HG: ICD-10-PCS | Mod: HCNC,CPTII,S$GLB, | Performed by: STUDENT IN AN ORGANIZED HEALTH CARE EDUCATION/TRAINING PROGRAM

## 2023-08-15 NOTE — PROGRESS NOTES
"    Subjective:      Dariel is a 83 y.o. male who comes for follow-up of  epistaxis  .  His last visit with me was on 7/18/2023.  Continues to have bleeding. Mostly left sided. Has bleed this morning after drinking coffee. S/p SPA ligation on 6/14/23. Initially did well, but now having more frequent bleeding.     Still on coumadin.     His current sinus regime consists of: Nasal saline.     The patient's medications, allergies, past medical, surgical, social and family histories were reviewed and updated as appropriate.    A detailed review of systems was obtained with pertinent positives as per the above HPI, and otherwise negative.        Objective:     BP (!) 168/72 (BP Location: Left arm, Patient Position: Sitting, BP Method: Large (Automatic))   Pulse (!) 54   Ht 5' 4" (1.626 m)   Wt 78.1 kg (172 lb 2.9 oz)   BMI 29.55 kg/m²        Constitutional:   Vital signs are normal. He appears well-developed and well-nourished.     Head:  Normocephalic and atraumatic.     Ears:  Hearing normal to normal and whispered voice; external ear normal without scars, lesions, or masses; ear canal, tympanic membrane, and middle ear normal..   Right Ear: No swelling. Tympanic membrane is not perforated and not bulging. No middle ear effusion.   Left Ear: No swelling. Tympanic membrane is not perforated and not bulging.  No middle ear effusion.     Nose:  Nose normal including turbinates, nasal mucosa, sinuses and nasal septum. No epistaxis.     Mouth/Throat  Oropharynx clear and moist without lesions or asymmetry and normal uvula midline. Normal dentition. No tonsillar abscesses. Tonsillar exudate.      Neck:  Neck normal without thyromegaly masses, asymmetry, normal tracheal structure, crepitus, and tenderness, thyroid normal, trachea normal, phonation normal, full range of motion with neck supple and no adenopathy. No stridor present.        Head (right side): No submental adenopathy present.        Head (left side): No " "submental adenopathy present.     He has no cervical adenopathy.     Pulmonary/Chest:   No stridor.       Procedure    Nasal Endoscopy with Control of Epistaxis    After written consent was obtained the nose was anesthetized with topical pontocaine and phenylephrine pledgets.  silver nitrate was used to cauterize the prominent vascularity on the left anterior septum.  The patient tolerated the procedure well and there were no complications.  Hemostasis was obtained.  Bacitracin ointment was placed after cauterization.    Data Reviewed    WBC (K/uL)   Date Value   06/14/2023 5.22     Eosinophil % (%)   Date Value   06/14/2023 2.1     Eos # (K/uL)   Date Value   06/14/2023 0.1     Platelets (K/uL)   Date Value   06/14/2023 158     Glucose (mg/dL)   Date Value   04/18/2023 100     No results found for: "IGE"    No sinus imaging available.      Assessment:     1. Recurrent epistaxis    2. Anticoagulant long-term use    3. Stage 3b chronic kidney disease    4. Acute anterior epistaxis         Plan:     - Neuro referral for embolization.   - COnt afrin PRN  - Do not blow nose for 1 week.  Sneeze with mouth open.  - Do not take ibuprofen or aspirin for 1 week.  - Place saline nasal gel in nostrils at bedtime.  - May use saline nose drops during the day to prevent dryness.  - If bleeding recurs, use oxymetazoline (Afrin) spray in the nostril and call for follow-up appointment.    Jono Russell MD    "

## 2023-08-16 ENCOUNTER — TELEPHONE (OUTPATIENT)
Dept: NEUROSURGERY | Facility: CLINIC | Age: 83
End: 2023-08-16
Payer: MEDICARE

## 2023-08-16 NOTE — TELEPHONE ENCOUNTER
----- Message from Marilee Hays RN sent at 8/15/2023  3:34 PM CDT -----    ----- Message -----  From: Jono Russell MD  Sent: 8/15/2023  12:56 PM CDT  To: Reshma Fu MD; RADHA Hawkinsn,    This is the patient who I would like to send you for embolization. Recurrent epistaxis after SPA ligation. I saw him today and his is still having daily bleeding.     Jono

## 2023-08-21 ENCOUNTER — ANTI-COAG VISIT (OUTPATIENT)
Dept: CARDIOLOGY | Facility: CLINIC | Age: 83
End: 2023-08-21
Payer: MEDICARE

## 2023-08-21 ENCOUNTER — LAB VISIT (OUTPATIENT)
Dept: LAB | Facility: HOSPITAL | Age: 83
End: 2023-08-21
Attending: INTERNAL MEDICINE
Payer: MEDICARE

## 2023-08-21 DIAGNOSIS — Z95.2 H/O MECHANICAL AORTIC VALVE REPLACEMENT: ICD-10-CM

## 2023-08-21 DIAGNOSIS — D50.9 IRON DEFICIENCY ANEMIA, UNSPECIFIED IRON DEFICIENCY ANEMIA TYPE: ICD-10-CM

## 2023-08-21 DIAGNOSIS — Z79.01 ANTICOAGULANT LONG-TERM USE: ICD-10-CM

## 2023-08-21 DIAGNOSIS — Z79.01 ANTICOAGULANT LONG-TERM USE: Primary | ICD-10-CM

## 2023-08-21 LAB
FERRITIN SERPL-MCNC: 55 NG/ML (ref 20–300)
HGB BLD-MCNC: 10.8 G/DL (ref 14–18)
INR PPP: 2.9 (ref 0.8–1.2)
IRON SERPL-MCNC: 57 UG/DL (ref 45–160)
PROTHROMBIN TIME: 29 SEC (ref 9–12.5)
SATURATED IRON: 18 % (ref 20–50)
TOTAL IRON BINDING CAPACITY: 318 UG/DL (ref 250–450)
TRANSFERRIN SERPL-MCNC: 215 MG/DL (ref 200–375)

## 2023-08-21 PROCEDURE — 82728 ASSAY OF FERRITIN: CPT | Mod: HCNC | Performed by: INTERNAL MEDICINE

## 2023-08-21 PROCEDURE — 93793 ANTICOAG MGMT PT WARFARIN: CPT | Mod: S$GLB,,,

## 2023-08-21 PROCEDURE — 85610 PROTHROMBIN TIME: CPT | Mod: HCNC | Performed by: INTERNAL MEDICINE

## 2023-08-21 PROCEDURE — 84466 ASSAY OF TRANSFERRIN: CPT | Mod: HCNC | Performed by: INTERNAL MEDICINE

## 2023-08-21 PROCEDURE — 85018 HEMOGLOBIN: CPT | Mod: HCNC | Performed by: INTERNAL MEDICINE

## 2023-08-21 PROCEDURE — 93793 PR ANTICOAGULANT MGMT FOR PT TAKING WARFARIN: ICD-10-PCS | Mod: S$GLB,,,

## 2023-08-21 PROCEDURE — 36415 COLL VENOUS BLD VENIPUNCTURE: CPT | Mod: HCNC | Performed by: INTERNAL MEDICINE

## 2023-08-21 PROCEDURE — 83540 ASSAY OF IRON: CPT | Mod: HCNC | Performed by: INTERNAL MEDICINE

## 2023-09-11 ENCOUNTER — OFFICE VISIT (OUTPATIENT)
Dept: CARDIOLOGY | Facility: CLINIC | Age: 83
End: 2023-09-11
Payer: MEDICARE

## 2023-09-11 ENCOUNTER — LAB VISIT (OUTPATIENT)
Dept: LAB | Facility: HOSPITAL | Age: 83
End: 2023-09-11
Attending: INTERNAL MEDICINE
Payer: MEDICARE

## 2023-09-11 ENCOUNTER — ANTI-COAG VISIT (OUTPATIENT)
Dept: CARDIOLOGY | Facility: CLINIC | Age: 83
End: 2023-09-11
Payer: MEDICARE

## 2023-09-11 VITALS
HEIGHT: 65 IN | SYSTOLIC BLOOD PRESSURE: 140 MMHG | WEIGHT: 174.19 LBS | OXYGEN SATURATION: 96 % | BODY MASS INDEX: 29.02 KG/M2 | DIASTOLIC BLOOD PRESSURE: 80 MMHG | HEART RATE: 75 BPM

## 2023-09-11 DIAGNOSIS — I70.0 AORTIC CALCIFICATION: ICD-10-CM

## 2023-09-11 DIAGNOSIS — I65.23 BILATERAL CAROTID ARTERY OCCLUSION: ICD-10-CM

## 2023-09-11 DIAGNOSIS — I25.10 CORONARY ARTERY DISEASE INVOLVING NATIVE CORONARY ARTERY OF NATIVE HEART WITHOUT ANGINA PECTORIS: ICD-10-CM

## 2023-09-11 DIAGNOSIS — E11.59 HYPERTENSION ASSOCIATED WITH DIABETES: ICD-10-CM

## 2023-09-11 DIAGNOSIS — E11.51 TYPE 2 DIABETES MELLITUS WITH DIABETIC PERIPHERAL ANGIOPATHY WITHOUT GANGRENE, WITHOUT LONG-TERM CURRENT USE OF INSULIN: ICD-10-CM

## 2023-09-11 DIAGNOSIS — E78.5 HYPERLIPIDEMIA ASSOCIATED WITH TYPE 2 DIABETES MELLITUS: Primary | ICD-10-CM

## 2023-09-11 DIAGNOSIS — E11.69 HYPERLIPIDEMIA ASSOCIATED WITH TYPE 2 DIABETES MELLITUS: Primary | ICD-10-CM

## 2023-09-11 DIAGNOSIS — Z79.01 ANTICOAGULANT LONG-TERM USE: ICD-10-CM

## 2023-09-11 DIAGNOSIS — Z95.2 H/O MECHANICAL AORTIC VALVE REPLACEMENT: ICD-10-CM

## 2023-09-11 DIAGNOSIS — I15.0 RENOVASCULAR HYPERTENSION: ICD-10-CM

## 2023-09-11 DIAGNOSIS — I50.42 CHRONIC COMBINED SYSTOLIC AND DIASTOLIC HEART FAILURE: ICD-10-CM

## 2023-09-11 DIAGNOSIS — Z79.01 ANTICOAGULANT LONG-TERM USE: Primary | ICD-10-CM

## 2023-09-11 DIAGNOSIS — I15.2 HYPERTENSION ASSOCIATED WITH DIABETES: ICD-10-CM

## 2023-09-11 DIAGNOSIS — I48.0 PAROXYSMAL ATRIAL FIBRILLATION: ICD-10-CM

## 2023-09-11 DIAGNOSIS — R55 SYNCOPE, UNSPECIFIED SYNCOPE TYPE: ICD-10-CM

## 2023-09-11 DIAGNOSIS — N18.4 CHRONIC KIDNEY DISEASE (CKD), STAGE IV (SEVERE): ICD-10-CM

## 2023-09-11 DIAGNOSIS — R06.09 DOE (DYSPNEA ON EXERTION): ICD-10-CM

## 2023-09-11 DIAGNOSIS — I70.211 ATHEROSCLEROSIS OF NATIVE ARTERY OF RIGHT LOWER EXTREMITY WITH INTERMITTENT CLAUDICATION: ICD-10-CM

## 2023-09-11 DIAGNOSIS — I34.0 NONRHEUMATIC MITRAL VALVE REGURGITATION: ICD-10-CM

## 2023-09-11 LAB
INR PPP: 2.8 (ref 0.8–1.2)
PROTHROMBIN TIME: 28.3 SEC (ref 9–12.5)

## 2023-09-11 PROCEDURE — 1126F PR PAIN SEVERITY QUANTIFIED, NO PAIN PRESENT: ICD-10-PCS | Mod: HCNC,CPTII,S$GLB, | Performed by: INTERNAL MEDICINE

## 2023-09-11 PROCEDURE — 36415 COLL VENOUS BLD VENIPUNCTURE: CPT | Mod: HCNC | Performed by: INTERNAL MEDICINE

## 2023-09-11 PROCEDURE — 3288F FALL RISK ASSESSMENT DOCD: CPT | Mod: HCNC,CPTII,S$GLB, | Performed by: INTERNAL MEDICINE

## 2023-09-11 PROCEDURE — 1126F AMNT PAIN NOTED NONE PRSNT: CPT | Mod: HCNC,CPTII,S$GLB, | Performed by: INTERNAL MEDICINE

## 2023-09-11 PROCEDURE — 99999 PR PBB SHADOW E&M-EST. PATIENT-LVL IV: ICD-10-PCS | Mod: PBBFAC,HCNC,, | Performed by: INTERNAL MEDICINE

## 2023-09-11 PROCEDURE — 99999 PR PBB SHADOW E&M-EST. PATIENT-LVL IV: CPT | Mod: PBBFAC,HCNC,, | Performed by: INTERNAL MEDICINE

## 2023-09-11 PROCEDURE — 3079F PR MOST RECENT DIASTOLIC BLOOD PRESSURE 80-89 MM HG: ICD-10-PCS | Mod: HCNC,CPTII,S$GLB, | Performed by: INTERNAL MEDICINE

## 2023-09-11 PROCEDURE — 3079F DIAST BP 80-89 MM HG: CPT | Mod: HCNC,CPTII,S$GLB, | Performed by: INTERNAL MEDICINE

## 2023-09-11 PROCEDURE — 3077F PR MOST RECENT SYSTOLIC BLOOD PRESSURE >= 140 MM HG: ICD-10-PCS | Mod: HCNC,CPTII,S$GLB, | Performed by: INTERNAL MEDICINE

## 2023-09-11 PROCEDURE — 1101F PR PT FALLS ASSESS DOC 0-1 FALLS W/OUT INJ PAST YR: ICD-10-PCS | Mod: HCNC,CPTII,S$GLB, | Performed by: INTERNAL MEDICINE

## 2023-09-11 PROCEDURE — 3288F PR FALLS RISK ASSESSMENT DOCUMENTED: ICD-10-PCS | Mod: HCNC,CPTII,S$GLB, | Performed by: INTERNAL MEDICINE

## 2023-09-11 PROCEDURE — 3072F LOW RISK FOR RETINOPATHY: CPT | Mod: HCNC,CPTII,S$GLB, | Performed by: INTERNAL MEDICINE

## 2023-09-11 PROCEDURE — 85610 PROTHROMBIN TIME: CPT | Mod: HCNC | Performed by: INTERNAL MEDICINE

## 2023-09-11 PROCEDURE — 3077F SYST BP >= 140 MM HG: CPT | Mod: HCNC,CPTII,S$GLB, | Performed by: INTERNAL MEDICINE

## 2023-09-11 PROCEDURE — 1101F PT FALLS ASSESS-DOCD LE1/YR: CPT | Mod: HCNC,CPTII,S$GLB, | Performed by: INTERNAL MEDICINE

## 2023-09-11 PROCEDURE — 3072F PR LOW RISK FOR RETINOPATHY: ICD-10-PCS | Mod: HCNC,CPTII,S$GLB, | Performed by: INTERNAL MEDICINE

## 2023-09-11 PROCEDURE — 99214 OFFICE O/P EST MOD 30 MIN: CPT | Mod: HCNC,S$GLB,, | Performed by: INTERNAL MEDICINE

## 2023-09-11 PROCEDURE — 99214 PR OFFICE/OUTPT VISIT, EST, LEVL IV, 30-39 MIN: ICD-10-PCS | Mod: HCNC,S$GLB,, | Performed by: INTERNAL MEDICINE

## 2023-09-11 NOTE — PROGRESS NOTES
Subjective:    Patient ID:  Dariel Urbina is a 83 y.o. male who presents for evaluation shortness of breath    HPI  The patient  is an 83 year old male with CAD s/p CABG (1990s), mechanical AVR (on warfarin), moderate-severe MR, CKD stage 4 who presented in Intervention clinic 4/25/23 for evaluation for CORNELL Mitral valve. He underwent coronary angiogram procedure due to abnormal stress test and also as part of pre mitral clip evaluation but no intervention was done. A KIRSTEN 4/14/23 revealed only mild mitral regurgitation and Griselda Clip was deferred... He continues with LÓPEZ at 100 feet and ever working with his hands. EKG was in AF with frequent PVCs. EF 45% unchanged from 2022.    Summary 10/5/22    The left ventricle is mildly enlarged with eccentric hypertrophy and mildly decreased systolic function. The estimated ejection fraction is 45%.  There is abnormal septal wall motion consistent with post-operative status.  Moderate right ventricular enlargement with normal right ventricular systolic function.  Left ventricular diastolic dysfunction.  Biatrial enlargement.  There is a mechanical aortic valve present. There is no aortic insufficiency present. Prosthetic aortic valve is normal.  The aortic valve mean gradient is 13 mmHg with a dimensionless index of 0.36.  Mild-to-moderate mitral regurgitation.  Mild to moderate tricuspid regurgitation.  The estimated PA systolic pressure is 42 mmHg.  Normal central venous pressure (3 mmHg).     Lab Results   Component Value Date     04/18/2023    K 5.1 04/18/2023     04/18/2023    CO2 19 (L) 04/18/2023    BUN 59 (H) 04/18/2023    CREATININE 2.3 (H) 04/18/2023     04/18/2023    HGBA1C 5.2 02/01/2023    MG 1.7 04/15/2023    AST 16 04/15/2023    ALT 11 04/15/2023    ALBUMIN 3.3 (L) 04/15/2023    PROT 6.8 04/15/2023    BILITOT 0.5 04/15/2023    WBC 5.22 06/14/2023    HGB 10.8 (L) 08/21/2023    HCT 27.3 (L) 06/14/2023     (H) 06/14/2023      06/14/2023    INR 2.8 (H) 09/11/2023    INR 3.7 01/31/2022    INR 2.0 (H) 03/15/2010    PSA 0.35 07/27/2012    TSH 3.705 09/29/2022         Lab Results   Component Value Date    CHOL 115 (L) 02/28/2023    HDL 37 (L) 02/28/2023    TRIG 155 (H) 02/28/2023       Lab Results   Component Value Date    LDLCALC 47.0 (L) 02/28/2023       Past Medical History:   Diagnosis Date    Anemia of chronic renal failure, stage 3 (moderate) 05/27/2015    Anticoagulant long-term use     Atherosclerosis of coronary artery bypass graft of native heart without angina pectoris 09/11/2012    3-27-18 [facility] Two vessel coronary artery disease.   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    Bilateral carotid artery disease 02/09/2017    Bleeding from the nose     Bleeding nose 03/21/2018    Cancer     Cataract     CHF (congestive heart failure)     CKD (chronic kidney disease) stage 3, GFR 30-59 ml/min 05/27/2015    Claudication of left lower extremity 09/17/2014    Colon polyp     Encounter for blood transfusion     Gastroesophageal reflux disease without esophagitis 03/19/2018    Gastrointestinal hemorrhage associated with intestinal diverticulosis 04/01/2018    Glaucoma     H/O mechanical aortic valve replacement 09/17/2014    History of gout 09/26/2012    Hyperparathyroidism due to renal insufficiency 07/27/2015    Internal hemorrhoid 04/03/2018    Long term current use of anticoagulant therapy 09/26/2012    Mechanical heart valve present     Metabolic acidosis with normal anion gap and bicarbonate losses 03/20/2018    Mixed hyperlipidemia 09/26/2012    NSTEMI (non-ST elevated myocardial infarction) 03/21/2018    Obesity, diabetes, and hypertension syndrome 02/23/2016    Paroxysmal atrial fibrillation 02/06/2023    PVD (peripheral vascular disease) 09/11/2012    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    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 02/01/2023    vertex scalp    Syncope 09/29/2022     Type 2 diabetes mellitus with diabetic peripheral angiopathy without gangrene 05/27/2015    Type 2 diabetes mellitus with stage 3 chronic kidney disease, without long-term current use of insulin 10/02/2013       Current Outpatient Medications:     allopurinoL (ZYLOPRIM) 100 MG tablet, Take 1 tablet (100 mg total) by mouth once daily., Disp: 90 tablet, Rfl: 3    bumetanide (BUMEX) 1 MG tablet, Take 1 tablet (1 mg total) by mouth every other day., Disp: 45 tablet, Rfl: 3    enoxaparin (LOVENOX) 80 mg/0.8 mL Syrg, Inject 0.7 mLs (70 mg total) into the skin Daily., Disp: 8 mL, Rfl: 1    ferrous gluconate (FERGON) 324 MG tablet, Take 1 tablet (324 mg total) by mouth daily with breakfast., Disp: 90 tablet, Rfl: 1    isosorbide mononitrate (IMDUR) 60 MG 24 hr tablet, Take 1 tablet (60 mg total) by mouth every evening., Disp: 90 tablet, Rfl: 3    loperamide (IMODIUM) 2 mg capsule, Take 2 mg by mouth 4 (four) times daily as needed for Diarrhea., Disp: , Rfl:     metoprolol succinate (TOPROL-XL) 25 MG 24 hr tablet, Take 1 tablet (25 mg total) by mouth once daily., Disp: 90 tablet, Rfl: 3    omega-3 fatty acids/fish oil (FISH OIL-OMEGA-3 FATTY ACIDS) 300-1,000 mg capsule, Take 1 capsule by mouth once daily., Disp: , Rfl:     oxymetazoline (AFRIN) 0.05 % nasal spray, Use 2 sprays by Nasal route daily as needed (nose bleed)., Disp: 30 mL, Rfl: 0    rosuvastatin (CRESTOR) 40 MG Tab, TAKE 1 TABLET EVERY EVENING., Disp: 90 tablet, Rfl: 3    warfarin (COUMADIN) 5 MG tablet, warfarin 7.5 (1.5 tablets) sun and Thursday, and 5mg other days. warfarin 7.5 (1.5 tablets) sun and Thursday, and 5mg other days, Disp: 30 tablet, Rfl: 11    albuterol (VENTOLIN HFA) 90 mcg/actuation inhaler, Inhale 2 puffs into the lungs every 6 (six) hours as needed for Wheezing. Rescue (Patient not taking: Reported on 8/15/2023), Disp: 18 g, Rfl: 1  No current facility-administered medications for this visit.    Facility-Administered  "Medications Ordered in Other Visits:     fentaNYL 50 mcg/mL injection 25 mcg, 25 mcg, Intravenous, Q5 Min PRN, Saeed Farrell MD    haloperidol lactate injection 0.5 mg, 0.5 mg, Intravenous, Q10 Min PRN, Saeed Farrell MD    HYDROmorphone injection 0.2 mg, 0.2 mg, Intravenous, Q5 Min PRN, Saeed Farrell MD    sodium chloride 0.9% flush 10 mL, 10 mL, Intravenous, PRN, Saeed Farrell MD          Review of Systems   Constitutional: Negative for decreased appetite, diaphoresis, fever, malaise/fatigue, weight gain and weight loss.   HENT:  Negative for congestion, ear discharge, ear pain and nosebleeds.    Eyes:  Negative for blurred vision, double vision and visual disturbance.   Cardiovascular:  Positive for dyspnea on exertion. Negative for chest pain, claudication, cyanosis, irregular heartbeat, leg swelling, near-syncope, orthopnea, palpitations, paroxysmal nocturnal dyspnea and syncope.   Respiratory:  Positive for shortness of breath. Negative for cough, hemoptysis, sleep disturbances due to breathing, snoring, sputum production and wheezing.    Endocrine: Negative for polydipsia, polyphagia and polyuria.   Hematologic/Lymphatic: Negative for adenopathy and bleeding problem. Does not bruise/bleed easily.   Skin:  Negative for color change, nail changes, poor wound healing and rash.   Musculoskeletal:  Negative for muscle cramps and muscle weakness.   Gastrointestinal:  Negative for abdominal pain, anorexia, change in bowel habit, hematochezia, nausea and vomiting.   Genitourinary:  Negative for dysuria, frequency and hematuria.   Neurological:  Negative for brief paralysis, difficulty with concentration, excessive daytime sleepiness, dizziness, focal weakness, headaches, light-headedness, seizures, vertigo and weakness.   Psychiatric/Behavioral:  Negative for altered mental status and depression.    Allergic/Immunologic: Negative for persistent infections.        Objective:BP (!) 140/80   Pulse 75   Ht 5' 5" " (1.651 m)   Wt 79 kg (174 lb 2.6 oz)   SpO2 96%   BMI 28.98 kg/m²             Physical Exam  Constitutional:       Appearance: Normal appearance. He is well-developed.   HENT:      Head: Normocephalic.      Right Ear: External ear normal.      Left Ear: External ear normal.      Nose: Nose normal.   Eyes:      General: No scleral icterus.     Pupils: Pupils are equal, round, and reactive to light.   Neck:      Thyroid: No thyromegaly.      Vascular: No JVD.      Trachea: No tracheal deviation.   Cardiovascular:      Rate and Rhythm: Normal rate. Rhythm irregularly irregular.      Pulses: Intact distal pulses.           Carotid pulses are 1+ on the right side and 1+ on the left side.       Dorsalis pedis pulses are 0 on the right side and 0 on the left side.        Posterior tibial pulses are 0 on the right side and 0 on the left side.      Heart sounds: No murmur heard.     No friction rub. No gallop.      Comments: JVP mid way to angle of mandible.  l edema  Pulmonary:      Effort: Pulmonary effort is normal.      Breath sounds: Normal breath sounds.   Abdominal:      General: Bowel sounds are normal. There is no distension.      Tenderness: There is no abdominal tenderness. There is no guarding.   Musculoskeletal:         General: No tenderness. Normal range of motion.      Cervical back: Normal range of motion and neck supple.   Lymphadenopathy:      Comments: Palpation of neck and groin lymph nodes normal   Skin:     General: Skin is dry.      Comments: Palpation of skin normal   Neurological:      Mental Status: He is alert and oriented to person, place, and time.      Cranial Nerves: No cranial nerve deficit.      Motor: No abnormal muscle tone.      Coordination: Coordination normal.   Psychiatric:         Behavior: Behavior normal.         Thought Content: Thought content normal.         Judgment: Judgment normal.         Assessment:       1. Hyperlipidemia associated with type 2 diabetes mellitus    2.  Renovascular hypertension    3. H/O mechanical aortic valve replacement    4. Atherosclerosis of native artery of right lower extremity with intermittent claudication    5. Bilateral carotid artery occlusion    6. Hypertension associated with diabetes    7. Coronary artery disease involving native coronary artery of native heart without angina pectoris    8. Chronic combined systolic and diastolic heart failure    9. Paroxysmal atrial fibrillation    10. Aortic calcification    11. Nonrheumatic mitral valve regurgitation    12. Chronic kidney disease (CKD), stage IV (severe)    13. Anticoagulant long-term use    14. Type 2 diabetes mellitus with diabetic peripheral angiopathy without gangrene, without long-term current use of insulin    15. Syncope, unspecified syncope type         Plan:       Dariel was seen today for shortness of breath.    Diagnoses and all orders for this visit:    Hyperlipidemia associated with type 2 diabetes mellitus    Renovascular hypertension    H/O mechanical aortic valve replacement  -     Ambulatory referral/consult to [facility]    Atherosclerosis of native artery of right lower extremity with intermittent claudication    Bilateral carotid artery occlusion    Hypertension associated with diabetes    Coronary artery disease involving native coronary artery of native heart without angina pectoris    Chronic combined systolic and diastolic heart failure    Paroxysmal atrial fibrillation    Aortic calcification    Nonrheumatic mitral valve regurgitation    Chronic kidney disease (CKD), stage IV (severe)    Anticoagulant long-term use    Type 2 diabetes mellitus with diabetic peripheral angiopathy without gangrene, without long-term current use of insulin    Syncope, unspecified syncope type

## 2023-09-14 NOTE — TELEPHONE ENCOUNTER
----- Message from Daniel Bryan sent at 9/14/2023 10:44 AM CDT -----  Contact: self [phone]  Requesting an RX refill or new RX.  Is this a refill or new RX: refill  RX name and strength (copy/paste from chart):  warfarin (COUMADIN) 5 MG tablet  Is this a 30 day or 90 day RX:   Pharmacy name and phone # (copy/paste from chart):    [facility] - [place], [place] - [address]  [address] [facility] [address]  Phone: [phone] Fax: [phone]      The doctors have asked that we provide their patients with the following 2 reminders -- prescription refills can take up to 72 hours, and a friendly reminder that in the future you can use your [facility] account to request refills: yes    Please call and advise

## 2023-09-15 RX ORDER — WARFARIN SODIUM 5 MG/1
TABLET ORAL
Qty: 30 TABLET | Refills: 11
Start: 2023-09-15 | End: 2023-09-18 | Stop reason: SDUPTHER

## 2023-09-18 ENCOUNTER — TELEPHONE (OUTPATIENT)
Dept: NEUROSURGERY | Facility: CLINIC | Age: 83
End: 2023-09-18
Payer: MEDICARE

## 2023-09-18 ENCOUNTER — OFFICE VISIT (OUTPATIENT)
Dept: NEUROSURGERY | Facility: CLINIC | Age: 83
End: 2023-09-18
Payer: MEDICARE

## 2023-09-18 VITALS — DIASTOLIC BLOOD PRESSURE: 65 MMHG | SYSTOLIC BLOOD PRESSURE: 156 MMHG | HEART RATE: 84 BPM | TEMPERATURE: 98 F

## 2023-09-18 DIAGNOSIS — R04.0 EPISTAXIS: ICD-10-CM

## 2023-09-18 DIAGNOSIS — D64.9 ANEMIA, UNSPECIFIED TYPE: Primary | ICD-10-CM

## 2023-09-18 DIAGNOSIS — R04.0 RECURRENT EPISTAXIS: Primary | ICD-10-CM

## 2023-09-18 PROCEDURE — 3072F PR LOW RISK FOR RETINOPATHY: ICD-10-PCS | Mod: HCNC,CPTII,S$GLB, | Performed by: NEUROLOGICAL SURGERY

## 2023-09-18 PROCEDURE — 1126F PR PAIN SEVERITY QUANTIFIED, NO PAIN PRESENT: ICD-10-PCS | Mod: HCNC,CPTII,S$GLB, | Performed by: NEUROLOGICAL SURGERY

## 2023-09-18 PROCEDURE — 3078F PR MOST RECENT DIASTOLIC BLOOD PRESSURE < 80 MM HG: ICD-10-PCS | Mod: HCNC,CPTII,S$GLB, | Performed by: NEUROLOGICAL SURGERY

## 2023-09-18 PROCEDURE — 99999 PR PBB SHADOW E&M-EST. PATIENT-LVL III: ICD-10-PCS | Mod: PBBFAC,HCNC,, | Performed by: NEUROLOGICAL SURGERY

## 2023-09-18 PROCEDURE — 3078F DIAST BP <80 MM HG: CPT | Mod: HCNC,CPTII,S$GLB, | Performed by: NEUROLOGICAL SURGERY

## 2023-09-18 PROCEDURE — 1126F AMNT PAIN NOTED NONE PRSNT: CPT | Mod: HCNC,CPTII,S$GLB, | Performed by: NEUROLOGICAL SURGERY

## 2023-09-18 PROCEDURE — 1159F MED LIST DOCD IN RCRD: CPT | Mod: HCNC,CPTII,S$GLB, | Performed by: NEUROLOGICAL SURGERY

## 2023-09-18 PROCEDURE — 99205 OFFICE O/P NEW HI 60 MIN: CPT | Mod: HCNC,S$GLB,, | Performed by: NEUROLOGICAL SURGERY

## 2023-09-18 PROCEDURE — 3077F SYST BP >= 140 MM HG: CPT | Mod: HCNC,CPTII,S$GLB, | Performed by: NEUROLOGICAL SURGERY

## 2023-09-18 PROCEDURE — 99999 PR PBB SHADOW E&M-EST. PATIENT-LVL III: CPT | Mod: PBBFAC,HCNC,, | Performed by: NEUROLOGICAL SURGERY

## 2023-09-18 PROCEDURE — 3072F LOW RISK FOR RETINOPATHY: CPT | Mod: HCNC,CPTII,S$GLB, | Performed by: NEUROLOGICAL SURGERY

## 2023-09-18 PROCEDURE — 1160F PR REVIEW ALL MEDS BY PRESCRIBER/CLIN PHARMACIST DOCUMENTED: ICD-10-PCS | Mod: HCNC,CPTII,S$GLB, | Performed by: NEUROLOGICAL SURGERY

## 2023-09-18 PROCEDURE — 3077F PR MOST RECENT SYSTOLIC BLOOD PRESSURE >= 140 MM HG: ICD-10-PCS | Mod: HCNC,CPTII,S$GLB, | Performed by: NEUROLOGICAL SURGERY

## 2023-09-18 PROCEDURE — 1159F PR MEDICATION LIST DOCUMENTED IN MEDICAL RECORD: ICD-10-PCS | Mod: HCNC,CPTII,S$GLB, | Performed by: NEUROLOGICAL SURGERY

## 2023-09-18 PROCEDURE — 99205 PR OFFICE/OUTPT VISIT, NEW, LEVL V, 60-74 MIN: ICD-10-PCS | Mod: HCNC,S$GLB,, | Performed by: NEUROLOGICAL SURGERY

## 2023-09-18 PROCEDURE — 1160F RVW MEDS BY RX/DR IN RCRD: CPT | Mod: HCNC,CPTII,S$GLB, | Performed by: NEUROLOGICAL SURGERY

## 2023-09-18 RX ORDER — WARFARIN SODIUM 5 MG/1
TABLET ORAL
Qty: 30 TABLET | Refills: 11
Start: 2023-09-18 | End: 2023-09-19 | Stop reason: SDUPTHER

## 2023-09-18 NOTE — TELEPHONE ENCOUNTER
----- Message from Daniel Adams sent at 9/18/2023  9:39 AM CDT -----  Contact: self [phone]  Requesting an RX refill or new RX.  Is this a refill or new RX: refill  RX name and strength (copy/paste from chart):  warfarin (COUMADIN) 5 MG tablet  Is this a 30 day or 90 day RX:   Pharmacy name and phone # (copy/paste from chart):    [facility] [address] [address]  Phone: [phone] Fax: [phone]      The doctors have asked that we provide their patients with the following 2 reminders -- prescription refills can take up to 72 hours, and a friendly reminder that in the future you can use your [facility] account to request refills: yes    Please call and advise

## 2023-09-18 NOTE — PROGRESS NOTES
Neurosurgery  History & Physical    SUBJECTIVE:     Chief Complaint:  Epistaxis    History of Present Illness:  Patient is an 83-year-old man, past medical history of CKD, stage III, CHF, anemia of chronic kidney disease previous NSTEMI, with mechanical heart valve on anticoagulation.  Also has the history of type 2 diabetes with peripheral angiopathy, neuropathy and previous gangrene, with a history of hyperparathyroidism secondary to chronic renal disease.    He also has a more recent history of epistaxis.  Previously seen common referred to me by my ENT/skull base colleague, Dr. Jono Russell.  He previously saw Dr. Russell on 07/18/2023, then again on 08/15/2023.  He noted to continue to have epistaxis, mostly left-sided.  He previously underwent sphenopalatine artery ligation on 06/14/2023.  Initially bleeding was controlled, but now continues to have more frequent bleeding.  He continues to be on Coumadin.  Currently his sinus regimen consists of primarily nasal saline.      Patient notes that he continues to have epistaxis.  He has not had any in the past 3 days.  But knows normally he has epistaxis almost daily.  Almost exclusively under the right Ch.  He notes occasionally does have a right nose.  He says he feels now more that he feels it in the back of his throat as opposed to through the nasal cavity.  He notes that occasionally when he coughs he has dark blood clots that come up.  He denies any difficulty breathing or difficulty with swallowing.  He is on Coumadin secondary to mechanical heart valve replacement.  He is here secondary to referral by Dr. Russell for possible embolization of the sphenopalatine artery.          Review of patient's allergies indicates:   Allergen Reactions    Fosinopril      Intolerance- elevates potassium level      Losartan      Intolerance- elevates potassium level       Current Outpatient Medications   Medication Sig Dispense Refill    allopurinoL (ZYLOPRIM) 100 MG tablet  Take 1 tablet (100 mg total) by mouth once daily. 90 tablet 3    bumetanide (BUMEX) 1 MG tablet Take 1 tablet (1 mg total) by mouth every other day. 45 tablet 3    ferrous gluconate (FERGON) 324 MG tablet Take 1 tablet (324 mg total) by mouth daily with breakfast. 90 tablet 1    isosorbide mononitrate (IMDUR) 60 MG 24 hr tablet Take 1 tablet (60 mg total) by mouth every evening. 90 tablet 3    metoprolol succinate (TOPROL-XL) 25 MG 24 hr tablet Take 1 tablet (25 mg total) by mouth once daily. 90 tablet 3    omega-3 fatty acids/fish oil (FISH OIL-OMEGA-3 FATTY ACIDS) 300-1,000 mg capsule Take 1 capsule by mouth once daily.      rosuvastatin (CRESTOR) 40 MG Tab TAKE 1 TABLET EVERY EVENING. 90 tablet 3    warfarin (COUMADIN) 5 MG tablet warfarin 7.5 (1.5 tablets) sun and Thursday, and 5mg other days. warfarin 7.5 (1.5 tablets) sun and Thursday, and 5mg other days 30 tablet 11    albuterol (VENTOLIN HFA) 90 mcg/actuation inhaler Inhale 2 puffs into the lungs every 6 (six) hours as needed for Wheezing. Rescue (Patient not taking: Reported on 8/15/2023) 18 g 1    enoxaparin (LOVENOX) 80 mg/0.8 mL Syrg Inject 0.7 mLs (70 mg total) into the skin Daily. (Patient not taking: Reported on 9/18/2023) 8 mL 1    loperamide (IMODIUM) 2 mg capsule Take 2 mg by mouth 4 (four) times daily as needed for Diarrhea.      oxymetazoline (AFRIN) 0.05 % nasal spray Use 2 sprays by Nasal route daily as needed (nose bleed). (Patient not taking: Reported on 9/18/2023) 30 mL 0     No current facility-administered medications for this visit.     Facility-Administered Medications Ordered in Other Visits   Medication Dose Route Frequency Provider Last Rate Last Admin    fentaNYL 50 mcg/mL injection 25 mcg  25 mcg Intravenous Q5 Min PRN Saeed Farrell MD        haloperidol lactate injection 0.5 mg  0.5 mg Intravenous Q10 Min PRN Saeed Farrell MD        HYDROmorphone injection 0.2 mg  0.2 mg Intravenous Q5 Min PRN Saeed Farrell MD        sodium  chloride 0.9% flush 10 mL  10 mL Intravenous PRN Saeed Farrell MD           Past Medical History:   Diagnosis Date    Anemia of chronic renal failure, stage 3 (moderate) 05/27/2015    Anticoagulant long-term use     Atherosclerosis of coronary artery bypass graft of native heart without angina pectoris 09/11/2012    3-27-18 [facility] Two vessel coronary artery disease.   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    Bilateral carotid artery disease 02/09/2017    Bleeding from the nose     Bleeding nose 03/21/2018    Cancer     Cataract     CHF (congestive heart failure)     CKD (chronic kidney disease) stage 3, GFR 30-59 ml/min 05/27/2015    Claudication of left lower extremity 09/17/2014    Colon polyp     Encounter for blood transfusion     Gastroesophageal reflux disease without esophagitis 03/19/2018    Gastrointestinal hemorrhage associated with intestinal diverticulosis 04/01/2018    Glaucoma     H/O mechanical aortic valve replacement 09/17/2014    History of gout 09/26/2012    Hyperparathyroidism due to renal insufficiency 07/27/2015    Internal hemorrhoid 04/03/2018    Long term current use of anticoagulant therapy 09/26/2012    Mechanical heart valve present     Metabolic acidosis with normal anion gap and bicarbonate losses 03/20/2018    Mixed hyperlipidemia 09/26/2012    NSTEMI (non-ST elevated myocardial infarction) 03/21/2018    Obesity, diabetes, and hypertension syndrome 02/23/2016    Paroxysmal atrial fibrillation 02/06/2023    PVD (peripheral vascular disease) 09/11/2012    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     Past Surgical History:   Procedure Laterality Date    BACK SURGERY       CARDIAC CATHETERIZATION      CARDIAC VALVE REPLACEMENT      CARDIAC VALVE SURGERY      CARPAL TUNNEL RELEASE Right 05/19/2020    Procedure: RELEASE, CARPAL TUNNEL;  Surgeon: Rupesh Norris Jr., MD;  Location: [facility];  Service: Plastics;  Laterality: Right;    CATARACT EXTRACTION Left 11/13/2022        COLON SURGERY      COLONOSCOPY N/A 03/31/2017    Procedure: COLONOSCOPY;  Surgeon: Bruno Raymond MD;  Location: [facility] ENDO (4TH FLR);  Service: Endoscopy;  Laterality: N/A;  Patient's wife requesting date.    COLONOSCOPY N/A 04/03/2018    Procedure: COLONOSCOPY;  Surgeon: Bonifacio Pelletier MD;  Location: [facility] ENDO (2ND FLR);  Service: Endoscopy;  Laterality: N/A;    COLONOSCOPY N/A 08/13/2018    Procedure: COLONOSCOPY;  Surgeon: Kam Barba MD;  Location: [facility] ENDO (2ND FLR);  Service: Endoscopy;  Laterality: N/A;  2nd floor: PA pressure 49; hx of moderate-severe valve disease     per Coumadin clinic-Patient can hold 5 days with lovenox bridge       ok to schedule per Katarina    CORONARY ANGIOGRAPHY N/A 10/04/2021    Procedure: Left heart cath +/- peripheral angiogram;  Surgeon: Jose Ruiz MD;  Location: [facility] CATH LAB;  Service: Cardiology;  Laterality: N/A;    CORONARY ANGIOGRAPHY N/A 3/2/2023    Procedure: Angiogram, Coronary;  Surgeon: Devon Garnica MD;  Location: [facility] CATH LAB;  Service: Cardiology;  Laterality: N/A;    CORONARY ANGIOPLASTY      CORONARY ARTERY BYPASS GRAFT      CORONARY BYPASS GRAFT ANGIOGRAPHY  10/04/2021    Procedure: Bypass graft study;  Surgeon: Jose Ruiz MD;  Location: [facility] CATH LAB;  Service: Cardiology;;    CORONARY BYPASS GRAFT ANGIOGRAPHY  3/2/2023    Procedure: Bypass graft study;  Surgeon: Devon Garnica MD;  Location: [facility] CATH LAB;  Service: Cardiology;;    ECHOCARDIOGRAM,TRANSESOPHAGEAL N/A 4/14/2023    Procedure: Transesophageal echo (KIRSTEN) intra-procedure log documentation;  Surgeon: [facility] Diagnostic Provider;  Location: [facility] EP LAB;  Service:  Cardiology;  Laterality: N/A;    EYE SURGERY      FUNCTIONAL ENDOSCOPIC SINUS SURGERY (FESS) Bilateral 6/14/2023    Procedure: FESS (FUNCTIONAL ENDOSCOPIC SINUS SURGERY);  Surgeon: Jono Russell MD;  Location: [facility] OR 2ND FLR;  Service: ENT;  Laterality: Bilateral;    HERNIA REPAIR      INTRAOCULAR PROSTHESES INSERTION Left 11/13/2022    Procedure: INSERTION, IOL PROSTHESIS;  Surgeon: Alia Mckeon MD;  Location: [facility] OR 1ST FLR;  Service: Ophthalmology;  Laterality: Left;    INTRAOCULAR PROSTHESES INSERTION Right 12/04/2022    Procedure: INSERTION, IOL PROSTHESIS;  Surgeon: Alia Mckeon MD;  [facility];  Service: Ophthalmology;  Laterality: Right;    LIGATION, ARTERY, SPHENOPALATINE, ENDOSCOPIC Bilateral 6/14/2023    Procedure: LIGATION, ARTERY, SPHENOPALATINE, ENDOSCOPIC;  Surgeon: Jono Russell MD;  Location: [facility] OR 2ND FLR;  Service: ENT;  Laterality: Bilateral;    PHACOEMULSIFICATION OF CATARACT Left 11/13/2022    Procedure: PHACOEMULSIFICATION, CATARACT;  Surgeon: Alia Mckeon MD;  Location: [facility] OR [address] [place] [address];  Service: Ophthalmology;  Laterality: Left;    PHACOEMULSIFICATION OF CATARACT Right 12/04/2022    Procedure: PHACOEMULSIFICATION, CATARACT;  Surgeon: Alia Mckeon MD;  [facility];  Service: Ophthalmology;  Laterality: Right;    SPINE SURGERY      TRANSESOPHAGEAL ECHOCARDIOGRAPHY N/A 3/22/2023    Procedure: ECHOCARDIOGRAM, TRANSESOPHAGEAL;  Surgeon: Thuan Diagnostic Provider;  Location: [facility] EP LAB;  Service: Anesthesiology;  Laterality: N/A;    TRANSESOPHAGEAL ECHOCARDIOGRAPHY N/A 4/11/2023    Procedure: ECHOCARDIOGRAM, TRANSESOPHAGEAL;  Surgeon: Zenia Diagnostic Provider;  Location: [facility] EP LAB;  Service: Anesthesiology;  Laterality: N/A;    VASECTOMY       Family History       Problem Relation (Age of Onset)    Alcohol abuse Father    Diabetes Brother    Heart disease Mother, Father, Brother, Sister    Heart failure Mother, Father, Brother    No  Known Problems Sister, Maternal Grandmother, Maternal Grandfather, Paternal Grandmother, Paternal Grandfather, Maternal Aunt, Maternal Uncle, Paternal Aunt, Paternal Uncle          Social History     Socioeconomic History    Marital status:      Spouse name: Ameena    Number of children: 3   Occupational History    Occupation: retired   Tobacco Use    Smoking status: Former     Current packs/day: 0.00     Average packs/day: 1 pack/day for 20.0 years (20.0 ttl pk-yrs)     Types: Cigarettes     Start date: 1960     Quit date: 1980     Years since quittin.0     Passive exposure: Never    Smokeless tobacco: Never   Substance and Sexual Activity    Alcohol use: No    Drug use: No    Sexual activity: Not Currently     Partners: Female     Social Determinants of Health     Financial Resource Strain: Low Risk  (2023)    Overall Financial Resource Strain (CARDIA)     Difficulty of Paying Living Expenses: Not hard at all   Food Insecurity: No Food Insecurity (2023)    Hunger Vital Sign     Worried About Running Out of Food in the Last Year: Never true     Ran Out of Food in the Last Year: Never true   Transportation Needs: No Transportation Needs (2023)    PRAPARE - Transportation     Lack of Transportation (Medical): No     Lack of Transportation (Non-Medical): No   Physical Activity: Inactive (2023)    Exercise Vital Sign     Days of Exercise per Week: 0 days     Minutes of Exercise per Session: 0 min   Stress: No Stress Concern Present (2023)    [place] [employer] - Occupational Stress Questionnaire     Feeling of Stress : Not at all   Social Connections: Moderately Isolated (2023)    Social Connection and Isolation Panel [NHANES]     Frequency of Communication with Friends and Family: Once a week     Frequency of Social Gatherings with Friends and Family: More than three times a week     Attends Christian Services: Never     Active Member of Clubs or  Organizations: No     Attends Club or Organization Meetings: Never     Marital Status:    Housing Stability: Low Risk  (7/26/2023)    Housing Stability Vital Sign     Unable to Pay for Housing in the Last Year: No     Number of Places Lived in the Last Year: 1     Unstable Housing in the Last Year: No       Review of Systems    OBJECTIVE:     Vital Signs  Temp: 97.8 °F (36.6 °C)  Pulse: 84  BP: (!) 156/65  Pain Score: 0-No pain  There is no height or weight on file to calculate BMI.      Neurosurgery Physical Exam  General: no acute distress  Head: Non-traumatic, normocephalic    Nose:  Petechiae on the nose, around the perioral area on the right.    There is normal-appearing mucosa in the right nasal cavity.  Left nasal cavity areas some fullness reddening of the nasal mucosa with crusted blood along the nasal orifice.  Eyes: Pupils equal, EOMI  Neck: Supple, normal ROM, no tenderness to palpation  CVS: Normal rate and rhythm, distal pulses present  Pulm: Symmetric expansion, no respiratory distress  GI: Abdomen nondistended, nontender    MSK: Moves all extremities without restriction, atraumatic  Skin: Dry, intact  Psych: Normal thought content and cognition    Neuro:  Alert, awake, oriented, to self, place, time  Language:  Speech is fluent, goal directed without any noted dysarthria or aphasia    Cranial nerves:    CNII-XII: Intact on fine exam,   Pupils equal round react to light,   Extraocular muscles are intact  V1 to V3 is intact to light touch,   no facial asymmetry,   Hearing is intact to finger rub and voice  tongue/uvula/palate midline,   shoulder shrug equal,     No pronator drift    Extremities:  Motor:  Upper Extremity    Deltoid Triceps Biceps Wrist  Extension Wrist  Flexion Interosseous   R 5/5 5/5 5/5 5/5 5/5 5/5   L 5/5 5/5 5/5 5/5 5/5 5/5       Thumb   Abduction Thumb  ADDuction Finger  Flexion Finger  Extension     R 5/5 5/5 5/5 5/5     L 5/5 5/5 5/5 5/5        Lower Extremity      Iliopsoas Quadriceps Hamstring Plantarflexion Dorsiflexion EHL   R 5/5 5/5 5/5 5/5 5/5 5/5   L 5/5 5/5 5/5 5/5 5/5 5/5       Reflexes:     DTR: 2+ biceps    2+ patellar     Smith's: Negative       Sensory:      Sensation intact to light touch,      Coordination:      Coordination intact  Cerebellar:  Normal finger-to-nose      Diagnostic Results:  No diagnostic imaging to review    ASSESSMENT/PLAN:     83-year-old man with recurrent epistaxis, previous transnasal sphenopalatine artery ligation     1. Patient with no focal neurologic deficits on examination.  There is some crusting around the left nasal cavity orifice nasal orifice.      2. Had long discussion with the patient.  Would recommend given patient continues to have daily/intermittent epistaxis.  Would recommend exploration and further embolization of the sphenopalatine artery or any lateralized vessels that may be contributing to additional and further epistaxis.  I discussed with the patient risks, benefits, and alternatives to the procedure including not limited to heart attack coma, stroke, infection, bleeding, paralysis, even death.  Informed consent was obtained and secured in chart after the patient voiced understanding these risks and decided proceed with the procedure.  Patient would like to proceed with the sphenopalatine artery embolization after returns from his trip to [place] with the spouse.    Thank you so very much for allowing me to participate in the care of this patient.  Please feel free to call any questions, comments, concerns.    Reshma Fu MD,MSc  Department of Neurosurgery   Department of Radiology  Department of Neurology  [facility]    [facility]   [employer] [facility]v    Total time spent in counseling and discussion about further management options including relevant lab work, treatment,   prognosis, medications and intended side effects was more than 60 minutes. More than 50 % of the time was spent in counseling and coordination of care.  I spent a total of 60 minutes on the day of the visit.This includes face to face time and non-face to face time preparing to see the patient (eg, review of tests), Obtaining and/or reviewing separately obtained history, Documenting clinical information in the electronic or other health record, Independently interpreting resultsand communicating results to the patient/family/caregiver, or Care coordination         Note dictated with voice recognition software, please excuse any grammatical errors.

## 2023-09-19 ENCOUNTER — HOSPITAL ENCOUNTER (OUTPATIENT)
Dept: PULMONOLOGY | Facility: CLINIC | Age: 83
Discharge: HOME | End: 2023-09-19
Payer: MEDICARE

## 2023-09-19 ENCOUNTER — TELEPHONE (OUTPATIENT)
Dept: INTERNAL MEDICINE | Facility: CLINIC | Age: 83
End: 2023-09-19
Payer: MEDICARE

## 2023-09-19 ENCOUNTER — OFFICE VISIT (OUTPATIENT)
Dept: OTOLARYNGOLOGY | Facility: CLINIC | Age: 83
End: 2023-09-19
Payer: MEDICARE

## 2023-09-19 VITALS
BODY MASS INDEX: 28.51 KG/M2 | SYSTOLIC BLOOD PRESSURE: 170 MMHG | WEIGHT: 171.31 LBS | DIASTOLIC BLOOD PRESSURE: 64 MMHG | HEART RATE: 57 BPM

## 2023-09-19 VITALS — BODY MASS INDEX: 28.49 KG/M2 | WEIGHT: 171 LBS | HEIGHT: 65 IN

## 2023-09-19 DIAGNOSIS — R06.09 DOE (DYSPNEA ON EXERTION): ICD-10-CM

## 2023-09-19 DIAGNOSIS — Z79.01 ANTICOAGULANT LONG-TERM USE: ICD-10-CM

## 2023-09-19 DIAGNOSIS — R04.0 RECURRENT EPISTAXIS: Primary | ICD-10-CM

## 2023-09-19 DIAGNOSIS — N18.32 STAGE 3B CHRONIC KIDNEY DISEASE: ICD-10-CM

## 2023-09-19 PROCEDURE — 94618 PULMONARY STRESS TESTING: ICD-10-PCS | Mod: HCNC,S$GLB,, | Performed by: INTERNAL MEDICINE

## 2023-09-19 PROCEDURE — 99213 OFFICE O/P EST LOW 20 MIN: CPT | Mod: 25,HCNC,S$GLB, | Performed by: STUDENT IN AN ORGANIZED HEALTH CARE EDUCATION/TRAINING PROGRAM

## 2023-09-19 PROCEDURE — 1159F PR MEDICATION LIST DOCUMENTED IN MEDICAL RECORD: ICD-10-PCS | Mod: HCNC,CPTII,S$GLB, | Performed by: STUDENT IN AN ORGANIZED HEALTH CARE EDUCATION/TRAINING PROGRAM

## 2023-09-19 PROCEDURE — 3077F PR MOST RECENT SYSTOLIC BLOOD PRESSURE >= 140 MM HG: ICD-10-PCS | Mod: HCNC,CPTII,S$GLB, | Performed by: STUDENT IN AN ORGANIZED HEALTH CARE EDUCATION/TRAINING PROGRAM

## 2023-09-19 PROCEDURE — 1160F RVW MEDS BY RX/DR IN RCRD: CPT | Mod: HCNC,CPTII,S$GLB, | Performed by: STUDENT IN AN ORGANIZED HEALTH CARE EDUCATION/TRAINING PROGRAM

## 2023-09-19 PROCEDURE — 3078F DIAST BP <80 MM HG: CPT | Mod: HCNC,CPTII,S$GLB, | Performed by: STUDENT IN AN ORGANIZED HEALTH CARE EDUCATION/TRAINING PROGRAM

## 2023-09-19 PROCEDURE — 1126F AMNT PAIN NOTED NONE PRSNT: CPT | Mod: HCNC,CPTII,S$GLB, | Performed by: STUDENT IN AN ORGANIZED HEALTH CARE EDUCATION/TRAINING PROGRAM

## 2023-09-19 PROCEDURE — 99213 PR OFFICE/OUTPT VISIT, EST, LEVL III, 20-29 MIN: ICD-10-PCS | Mod: 25,HCNC,S$GLB, | Performed by: STUDENT IN AN ORGANIZED HEALTH CARE EDUCATION/TRAINING PROGRAM

## 2023-09-19 PROCEDURE — 1160F PR REVIEW ALL MEDS BY PRESCRIBER/CLIN PHARMACIST DOCUMENTED: ICD-10-PCS | Mod: HCNC,CPTII,S$GLB, | Performed by: STUDENT IN AN ORGANIZED HEALTH CARE EDUCATION/TRAINING PROGRAM

## 2023-09-19 PROCEDURE — 3072F LOW RISK FOR RETINOPATHY: CPT | Mod: HCNC,CPTII,S$GLB, | Performed by: STUDENT IN AN ORGANIZED HEALTH CARE EDUCATION/TRAINING PROGRAM

## 2023-09-19 PROCEDURE — 3078F PR MOST RECENT DIASTOLIC BLOOD PRESSURE < 80 MM HG: ICD-10-PCS | Mod: HCNC,CPTII,S$GLB, | Performed by: STUDENT IN AN ORGANIZED HEALTH CARE EDUCATION/TRAINING PROGRAM

## 2023-09-19 PROCEDURE — 1126F PR PAIN SEVERITY QUANTIFIED, NO PAIN PRESENT: ICD-10-PCS | Mod: HCNC,CPTII,S$GLB, | Performed by: STUDENT IN AN ORGANIZED HEALTH CARE EDUCATION/TRAINING PROGRAM

## 2023-09-19 PROCEDURE — 1159F MED LIST DOCD IN RCRD: CPT | Mod: HCNC,CPTII,S$GLB, | Performed by: STUDENT IN AN ORGANIZED HEALTH CARE EDUCATION/TRAINING PROGRAM

## 2023-09-19 PROCEDURE — 94618 PULMONARY STRESS TESTING: CPT | Mod: HCNC,S$GLB,, | Performed by: INTERNAL MEDICINE

## 2023-09-19 PROCEDURE — 99999 PR PBB SHADOW E&M-EST. PATIENT-LVL III: ICD-10-PCS | Mod: PBBFAC,HCNC,, | Performed by: STUDENT IN AN ORGANIZED HEALTH CARE EDUCATION/TRAINING PROGRAM

## 2023-09-19 PROCEDURE — 3072F PR LOW RISK FOR RETINOPATHY: ICD-10-PCS | Mod: HCNC,CPTII,S$GLB, | Performed by: STUDENT IN AN ORGANIZED HEALTH CARE EDUCATION/TRAINING PROGRAM

## 2023-09-19 PROCEDURE — 3077F SYST BP >= 140 MM HG: CPT | Mod: HCNC,CPTII,S$GLB, | Performed by: STUDENT IN AN ORGANIZED HEALTH CARE EDUCATION/TRAINING PROGRAM

## 2023-09-19 PROCEDURE — 99999 PR PBB SHADOW E&M-EST. PATIENT-LVL III: CPT | Mod: PBBFAC,HCNC,, | Performed by: STUDENT IN AN ORGANIZED HEALTH CARE EDUCATION/TRAINING PROGRAM

## 2023-09-19 RX ORDER — WARFARIN SODIUM 5 MG/1
TABLET ORAL
Qty: 45 TABLET | Refills: 3 | Status: SHIPPED | OUTPATIENT
Start: 2023-09-19 | End: 2023-12-26 | Stop reason: SDUPTHER

## 2023-09-19 NOTE — PROGRESS NOTES
Subjective:      Dariel is a 83 y.o. male who comes for follow-up of  epistaxis  .  His last visit with me was on 8/15/2023.  He is s/p SPA ligation on 6/14/23. He has seen Dr Fu on 9/18/23 for embolization. Procedure not scheduled yet. Reports daily self resolving episodes of epistaxis.     Still on coumadin.     His current sinus regime consists of: Nasal saline.     The patient's medications, allergies, past medical, surgical, social and family histories were reviewed and updated as appropriate.    A detailed review of systems was obtained with pertinent positives as per the above HPI, and otherwise negative.        Objective:     BP (!) 170/64 (Patient Position: Sitting)   Pulse (!) 57   Wt 77.7 kg (171 lb 4.8 oz)   BMI 28.51 kg/m²        Constitutional:   Vital signs are normal. He appears well-developed and well-nourished.     Head:  Normocephalic and atraumatic.     Ears:  Hearing normal to normal and whispered voice; external ear normal without scars, lesions, or masses; ear canal, tympanic membrane, and middle ear normal..   Right Ear: No swelling. Tympanic membrane is not perforated and not bulging. No middle ear effusion.   Left Ear: No swelling. Tympanic membrane is not perforated and not bulging.  No middle ear effusion.     Nose:  Nose normal including turbinates, nasal mucosa, sinuses and nasal septum. No epistaxis.     Mouth/Throat  Oropharynx clear and moist without lesions or asymmetry and normal uvula midline. Normal dentition. No tonsillar abscesses. Tonsillar exudate.      Neck:  Neck normal without thyromegaly masses, asymmetry, normal tracheal structure, crepitus, and tenderness, thyroid normal, trachea normal, phonation normal, full range of motion with neck supple and no adenopathy. No stridor present.        Head (right side): No submental adenopathy present.        Head (left side): No submental adenopathy present.     He has no cervical adenopathy.     Pulmonary/Chest:   No  "stridor.       Procedure    Nasal Endoscopy:  9/19/2023    The use of diagnostic nasal endoscopy was considered medically necessary for the evaluation and visualization of the nasal anatomy for symptoms suggestive of nasal or sinus origin. Physical examination (including a nasal speculum evaluation) did not provide sufficient clinical information to establish a diagnosis, or symptoms did not improve or worsened following treatment.     The nasal cavity was decongested with topical 1% phenylephrine and anesthetized with 4% lidocaine.  A rigid 0-degree endoscope was introduced into the nasal cavity.    The patient was seated in the examination chair. After discussion of risks and benefits, a nasal endoscope was inserted into the nose the endoscope was passed along the left nasal floor to the nasopharynx. It was then passed between the middle and superior meatus, nasal turbinates, nasal septum, nasopharynx and sphenoethmoid region. The nasal endoscope was withdrawn and there was no complications. An identical procedure was performed on the right side. I was present for the entire procedure.The patient tolerated the above procedure well. The findings of this procedure can be found in the dictated note from 9/19/2023 visit.      Large blood clot in left nasal cavity extending into maxillary sinus. Diffuse mucosal oozing from the left septum. Fibrillar placed.       Data Reviewed    WBC (K/uL)   Date Value   06/14/2023 5.22     Eosinophil % (%)   Date Value   06/14/2023 2.1     Eos # (K/uL)   Date Value   06/14/2023 0.1     Platelets (K/uL)   Date Value   06/14/2023 158     Glucose (mg/dL)   Date Value   04/18/2023 100     No results found for: "IGE"    No sinus imaging available.      Assessment:     1. Recurrent epistaxis    2. Anticoagulant long-term use    3. Stage 3b chronic kidney disease      Plan:     - F/u after embo  - Cont afrin PRN  - Do not blow nose for 1 week.  Sneeze with mouth open.  - Do not take " ibuprofen or aspirin for 1 week.  - Place saline nasal gel in nostrils at bedtime.  - May use saline nose drops during the day to prevent dryness.  - If bleeding recurs, use oxymetazoline (Afrin) spray in the nostril and call for follow-up appointment.    Jono Russell MD

## 2023-09-19 NOTE — TELEPHONE ENCOUNTER
----- Message from Airam Villegas sent at 9/19/2023  8:21 AM CDT -----  Contact: Ameena(wife)[phone]  Patient would like to get medical advice.  Symptoms (please be specific):   Pt wife states she was able to get the pt warfarin (COUMADIN) 5 MG tablet Rx filled thru the coumadin clinc and to cancel her previous message.   How long have you had these symptoms: N/A  Would you like a call back, or a response through your [facility] portal?:   pt wife states a call back is not needed.   Pharmacy name and phone # (copy from chart):   N/A  Comments:

## 2023-09-19 NOTE — PROCEDURES
Dariel Urbina is a 83 y.o.  male patient, who presents for a 6 minute walk test ordered by MD Manoj.  The diagnosis is Shortness of Breath, Qualify for Oxygen.  The patient's BMI is 28.5 kg/m2.  Predicted distance (lower limit of normal) is 258.04 meters.      Test Results:    The test was not completed. The patient stopped 1 time for a total of 170 seconds. The total time walked was 190 seconds. During walking, the patient reported:  Leg pain, Other (Comment) (Severe Hip Pain). The patient used no assistive devices during testing.     09/19/2023---------Distance: 121.92 meters (400 feet)     O2 Sat % Supplemental Oxygen Heart Rate Blood Pressure Adolfo Scale   Pre-exercise  (Resting) 98 % Room Air 54 bpm 185/72 mmHg 1   During Exercise 97 % Room Air 74 bpm 178/74 mmHg 1   Post-exercise  (Recovery) 98 % Room Air  69 bpm       Recovery Time: 53 seconds    Performing nurse/tech: Tai GARCIA      PREVIOUS STUDY:   The patient has not had a previous study.      CLINICAL INTERPRETATION:  Six minute walk distance is 121.92 meters (400 feet) with very light dyspnea.  During exercise, there was no significant desaturation while breathing room air.  Blood pressure remained stable and Heart rate increased significantly with walking.  Bradycardia and Hypertension were present prior to exercise.  The patient reported non-pulmonary symptoms during exercise.  Severe exercise impairment is likely due to cardiovascular causes and subjective symptoms.  The patient did not complete the study, walking 190 seconds of the 360 second test.  No previous study performed.  Based upon age and body mass index, exercise capacity is less than predicted.

## 2023-09-19 NOTE — PROGRESS NOTES
9/19/23 Wife called requesting a prescription for Warfarin 5 mg tablet be called in to [facility] pharmacy ph # [phone]

## 2023-09-24 ENCOUNTER — PATIENT MESSAGE (OUTPATIENT)
Dept: GASTROENTEROLOGY | Facility: CLINIC | Age: 83
End: 2023-09-24
Payer: MEDICARE

## 2023-09-24 DIAGNOSIS — D50.9 IRON DEFICIENCY ANEMIA, UNSPECIFIED IRON DEFICIENCY ANEMIA TYPE: ICD-10-CM

## 2023-09-24 RX ORDER — FERROUS GLUCONATE 324(38)MG
324 TABLET ORAL
Qty: 90 TABLET | Refills: 1 | Status: SHIPPED | OUTPATIENT
Start: 2023-09-24 | End: 2024-03-22

## 2023-09-24 NOTE — PROGRESS NOTES
GI MA team - please tell patient that they are iron deficient and anemic and recommend that they take ferrous gluconate one 324mg pill once daily for next 3 months.    GI MA team -  Please order repeat fasting Hemoglobin, Iron/TIBC, and Ferritin in 8 weeks - Orders placed.     Dariel your iron deficient anemic in your prior colonoscopy in 2018 shows a benign but a precancerous colon polyp in adenoma recommend you schedule an EGD colonoscopy for further evaluation of your iron deficiency anemia.  Referral placed

## 2023-09-30 ENCOUNTER — HOSPITAL ENCOUNTER (EMERGENCY)
Facility: HOSPITAL | Age: 83
Discharge: HOME | End: 2023-10-01
Attending: EMERGENCY MEDICINE
Payer: MEDICARE

## 2023-09-30 VITALS
HEART RATE: 88 BPM | WEIGHT: 172 LBS | OXYGEN SATURATION: 97 % | DIASTOLIC BLOOD PRESSURE: 65 MMHG | TEMPERATURE: 98 F | RESPIRATION RATE: 20 BRPM | HEIGHT: 65 IN | BODY MASS INDEX: 28.66 KG/M2 | SYSTOLIC BLOOD PRESSURE: 137 MMHG

## 2023-09-30 DIAGNOSIS — Z79.01 CHRONIC ANTICOAGULATION: ICD-10-CM

## 2023-09-30 DIAGNOSIS — R04.0 RECURRENT EPISTAXIS: Primary | ICD-10-CM

## 2023-09-30 LAB
BASOPHILS # BLD AUTO: 0.05 X10(3)/MCL
BASOPHILS NFR BLD AUTO: 0.8 %
EOSINOPHIL # BLD AUTO: 0.19 X10(3)/MCL (ref 0–0.9)
EOSINOPHIL NFR BLD AUTO: 2.9 %
ERYTHROCYTE [DISTWIDTH] IN BLOOD BY AUTOMATED COUNT: 14.3 % (ref 11.5–17)
HCT VFR BLD AUTO: 32.6 % (ref 42–52)
HGB BLD-MCNC: 10.4 G/DL (ref 14–18)
IMM GRANULOCYTES # BLD AUTO: 0.03 X10(3)/MCL (ref 0–0.04)
IMM GRANULOCYTES NFR BLD AUTO: 0.5 %
INR PPP: 2.7
LYMPHOCYTES # BLD AUTO: 1.65 X10(3)/MCL (ref 0.6–4.6)
LYMPHOCYTES NFR BLD AUTO: 24.8 %
MCH RBC QN AUTO: 30.2 PG (ref 27–31)
MCHC RBC AUTO-ENTMCNC: 31.9 G/DL (ref 33–36)
MCV RBC AUTO: 94.8 FL (ref 80–94)
MONOCYTES # BLD AUTO: 0.7 X10(3)/MCL (ref 0.1–1.3)
MONOCYTES NFR BLD AUTO: 10.5 %
NEUTROPHILS # BLD AUTO: 4.03 X10(3)/MCL (ref 2.1–9.2)
NEUTROPHILS NFR BLD AUTO: 60.5 %
NRBC BLD AUTO-RTO: 0 %
PLATELET # BLD AUTO: 155 X10(3)/MCL (ref 130–400)
PMV BLD AUTO: 10 FL (ref 7.4–10.4)
PROTHROMBIN TIME: 28.4 SECONDS (ref 12.5–14.5)
RBC # BLD AUTO: 3.44 X10(6)/MCL (ref 4.7–6.1)
WBC # SPEC AUTO: 6.65 X10(3)/MCL (ref 4.5–11.5)

## 2023-09-30 PROCEDURE — 99283 EMERGENCY DEPT VISIT LOW MDM: CPT

## 2023-09-30 PROCEDURE — 25000003 PHARM REV CODE 250: Performed by: EMERGENCY MEDICINE

## 2023-09-30 PROCEDURE — 85025 COMPLETE CBC W/AUTO DIFF WBC: CPT | Performed by: EMERGENCY MEDICINE

## 2023-09-30 PROCEDURE — 85610 PROTHROMBIN TIME: CPT | Performed by: EMERGENCY MEDICINE

## 2023-09-30 PROCEDURE — 86850 RBC ANTIBODY SCREEN: CPT | Performed by: EMERGENCY MEDICINE

## 2023-09-30 RX ORDER — SILVER NITRATE 38.21; 12.74 MG/1; MG/1
1 STICK TOPICAL
Status: COMPLETED | OUTPATIENT
Start: 2023-09-30 | End: 2023-09-30

## 2023-09-30 RX ORDER — OXYMETAZOLINE HCL 0.05 %
1 SPRAY, NON-AEROSOL (ML) NASAL
Status: COMPLETED | OUTPATIENT
Start: 2023-09-30 | End: 2023-09-30

## 2023-09-30 RX ADMIN — SILVER NITRATE APPLICATORS 1 APPLICATOR: 25; 75 STICK TOPICAL at 11:09

## 2023-09-30 RX ADMIN — OXYMETAZOLINE HYDROCHLORIDE 1 SPRAY: 0.05 SPRAY NASAL at 10:09

## 2023-10-01 LAB
GROUP & RH: NORMAL
INDIRECT COOMBS GEL: NORMAL
SPECIMEN OUTDATE: NORMAL

## 2023-10-01 NOTE — ED PROVIDER NOTES
Encounter Date: 9/30/2023    SCRIBE #1 NOTE: I, Dany De Oliveira, am scribing for, and in the presence of,  Indiana Nugent MD. I have scribed the following portions of the note - Other sections scribed: HPI, ROS, PE.       History     Chief Complaint   Patient presents with    Epistaxis     Nose bleed x3 hrs, on Coumadin for heart valve, last INR family states was 2.9, last checked >4 wks ago. Pt voice slightly gurgled in triage.      82 y/o male with a history of HTN, HLD, DM, CKD, CHF, CAD, and aortic valve replacement on Coumadin presents to the ED with a nosebleed going on for the past 3 hours. It is mostly coming from the left nostril. Pt has been putting pressure on the bleed with a rag and he reports that the bleeding has slowed down. Pt states he has been experiencing nosebleeds almost daily but is normally able to stop it, he has no idea what triggers the bleeding. Does have a history of blood transfusions. Last INR was 2.9 per relative at bedside. Sees ENT doctor in [place].     The history is provided by the patient and a relative. No  was used.     Review of patient's allergies indicates:   Allergen Reactions    Lisinopril     Losartan      Intolerance- elevates potassium level     Past Medical History:   Diagnosis Date    Anemia of chronic renal failure, stage 3 (moderate) 05/27/2015    Anticoagulant long-term use     Atherosclerosis of coronary artery bypass graft of native heart without angina pectoris 09/11/2012    3-27-18 [facility] Two vessel coronary artery disease.   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    Bilateral carotid artery disease 02/09/2017    Bleeding from the nose     Bleeding nose 03/21/2018    Cancer     Cataract     CHF (congestive heart failure)     CKD (chronic kidney disease) stage 3, GFR 30-59 ml/min 05/27/2015    Claudication of left lower extremity 09/17/2014    Colon polyp     Encounter for blood transfusion     Gastroesophageal  reflux disease without esophagitis 03/19/2018    Gastrointestinal hemorrhage associated with intestinal diverticulosis 04/01/2018    Glaucoma     H/O mechanical aortic valve replacement 09/17/2014    History of gout 09/26/2012    Hyperparathyroidism due to renal insufficiency 07/27/2015    Internal hemorrhoid 04/03/2018    Long term current use of anticoagulant therapy 09/26/2012    Mechanical heart valve present     Metabolic acidosis with normal anion gap and bicarbonate losses 03/20/2018    Mixed hyperlipidemia 09/26/2012    NSTEMI (non-ST elevated myocardial infarction) 03/21/2018    Obesity, diabetes, and hypertension syndrome 02/23/2016    Paroxysmal atrial fibrillation 02/06/2023    PVD (peripheral vascular disease) 09/11/2012    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     Past Surgical History:   Procedure Laterality Date    BACK SURGERY      CARDIAC CATHETERIZATION      CARDIAC VALVE REPLACEMENT      CARDIAC VALVE SURGERY      CARPAL TUNNEL RELEASE Right 05/19/2020    Procedure: RELEASE, CARPAL TUNNEL;  Surgeon: Rupesh Norris Jr., MD;  Location: [facility];  Service: Plastics;  Laterality: Right;    CATARACT EXTRACTION Left 11/13/2022        COLON SURGERY      COLONOSCOPY N/A 03/31/2017    Procedure: COLONOSCOPY;  Surgeon: Bruno Raymond MD;  Location: [facility] (4TH FLR);  Service: Endoscopy;  Laterality: N/A;  Patient's wife requesting date.    COLONOSCOPY N/A 04/03/2018    Procedure: COLONOSCOPY;  Surgeon: Bonifacio Pelletier MD;  Location: [facility] (2ND FLR);  Service: Endoscopy;  Laterality: N/A;    COLONOSCOPY N/A 08/13/2018    Procedure: COLONOSCOPY;  Surgeon: Kam Barba MD;  Location:  [facility] ENDO (2ND FLR);  Service: Endoscopy;  Laterality: N/A;  2nd floor: PA pressure 49; hx of moderate-severe valve disease     per Coumadin clinic-Patient can hold 5 days with lovenox bridge       ok to schedule per Katarina    CORONARY ANGIOGRAPHY N/A 10/04/2021    Procedure: Left heart cath +/- peripheral angiogram;  Surgeon: Jose Ruiz MD;  Location: [facility] CATH LAB;  Service: Cardiology;  Laterality: N/A;    CORONARY ANGIOGRAPHY N/A 3/2/2023    Procedure: Angiogram, Coronary;  Surgeon: Devon Garnica MD;  Location: [facility] CATH LAB;  Service: Cardiology;  Laterality: N/A;    CORONARY ANGIOPLASTY      CORONARY ARTERY BYPASS GRAFT      CORONARY BYPASS GRAFT ANGIOGRAPHY  10/04/2021    Procedure: Bypass graft study;  Surgeon: Jose Ruiz MD;  Location: [facility] CATH LAB;  Service: Cardiology;;    CORONARY BYPASS GRAFT ANGIOGRAPHY  3/2/2023    Procedure: Bypass graft study;  Surgeon: Devon Garnica MD;  Location: [facility] CATH LAB;  Service: Cardiology;;    ECHOCARDIOGRAM,TRANSESOPHAGEAL N/A 4/14/2023    Procedure: Transesophageal echo (KIRSTEN) intra-procedure log documentation;  Surgeon: Zenia Diagnostic Provider;  Location: [facility] EP LAB;  Service: Cardiology;  Laterality: N/A;    EYE SURGERY      FUNCTIONAL ENDOSCOPIC SINUS SURGERY (FESS) Bilateral 6/14/2023    Procedure: FESS (FUNCTIONAL ENDOSCOPIC SINUS SURGERY);  Surgeon: Jono Russell MD;  [address]  Service: ENT;  Laterality: Bilateral;    HERNIA REPAIR      INTRAOCULAR PROSTHESES INSERTION Left 11/13/2022    Procedure: INSERTION, IOL PROSTHESIS;  Surgeon: Alia Mckeon MD;  [address];  Service: Ophthalmology;  Laterality: Left;    INTRAOCULAR PROSTHESES INSERTION Right 12/04/2022    Procedure: INSERTION, IOL PROSTHESIS;  Surgeon: Alia Mckeon MD;  [facility];  Service: Ophthalmology;  Laterality: Right;    LIGATION, ARTERY, SPHENOPALATINE, ENDOSCOPIC Bilateral 6/14/2023    Procedure: LIGATION, ARTERY,  SPHENOPALATINE, ENDOSCOPIC;  Surgeon: Jono Russell MD;  Location: [facility] OR 2ND FLR;  Service: ENT;  Laterality: Bilateral;    PHACOEMULSIFICATION OF CATARACT Left 11/13/2022    Procedure: PHACOEMULSIFICATION, CATARACT;  Surgeon: Alia Mckeon MD;  [facility];  Service: Ophthalmology;  Laterality: Left;    PHACOEMULSIFICATION OF CATARACT Right 12/04/2022    Procedure: PHACOEMULSIFICATION, CATARACT;  Surgeon: Alia Mckeon MD;  [facility];  Service: Ophthalmology;  Laterality: Right;    SPINE SURGERY      TRANSESOPHAGEAL ECHOCARDIOGRAPHY N/A 3/22/2023    Procedure: ECHOCARDIOGRAM, TRANSESOPHAGEAL;  Surgeon: [facility] Diagnostic Provider;  Location: [facility] EP LAB;  Service: Anesthesiology;  Laterality: N/A;    TRANSESOPHAGEAL ECHOCARDIOGRAPHY N/A 4/11/2023    Procedure: ECHOCARDIOGRAM, TRANSESOPHAGEAL;  Surgeon: Zenia Diagnostic Provider;  Location: [facility] EP LAB;  Service: Anesthesiology;  Laterality: N/A;    VASECTOMY       Family History   Problem Relation Age of Onset    Heart failure Mother     Heart disease Mother     Heart failure Father     Heart disease Father     Alcohol abuse Father     Heart failure Brother     Heart disease Brother     Diabetes Brother     No Known Problems Sister     No Known Problems Maternal Grandmother     No Known Problems Maternal Grandfather     No Known Problems Paternal Grandmother     No Known Problems Paternal Grandfather     Heart disease Sister     No Known Problems Maternal Aunt     No Known Problems Maternal Uncle     No Known Problems Paternal Aunt     No Known Problems Paternal Uncle     Amblyopia Neg Hx     Blindness Neg Hx     Cancer Neg Hx     Cataracts Neg Hx     Glaucoma Neg Hx     Hypertension Neg Hx     Macular degeneration Neg Hx     Retinal detachment Neg Hx     Strabismus Neg Hx     Stroke Neg Hx     Thyroid disease Neg Hx     Anemia Neg Hx     Arrhythmia Neg Hx     Asthma Neg Hx     Clotting disorder Neg Hx     Fainting Neg Hx      Heart attack Neg Hx     Hyperlipidemia Neg Hx     Atrial Septal Defect Neg Hx     Melanoma Neg Hx      Social History     Tobacco Use    Smoking status: Former     Current packs/day: 0.00     Average packs/day: 1 pack/day for 20.0 years (20.0 ttl pk-yrs)     Types: Cigarettes     Start date: 1960     Quit date: 1980     Years since quittin.0     Passive exposure: Never    Smokeless tobacco: Never   Substance Use Topics    Alcohol use: No    Drug use: No     Review of Systems   Constitutional:  Negative for activity change, diaphoresis, fatigue and fever.   HENT:  Positive for nosebleeds. Negative for congestion, postnasal drip, rhinorrhea, sinus pain, sneezing and sore throat.    Respiratory:  Negative for cough, chest tightness, shortness of breath and wheezing.    Cardiovascular:  Negative for chest pain, palpitations and leg swelling.   Gastrointestinal:  Negative for abdominal distention, abdominal pain and blood in stool.   Genitourinary:  Negative for decreased urine volume, difficulty urinating and dysuria.   Musculoskeletal: Negative.    Skin:  Negative for color change and pallor.   Neurological:  Negative for dizziness, speech difficulty, weakness, light-headedness and numbness.   All other systems reviewed and are negative.      Physical Exam     Initial Vitals   BP Pulse Resp Temp SpO2   23 2228 23 2228 23 2248 23 2228 23 2228   (!) 148/63 68 20 98.3 °F (36.8 °C) 99 %      MAP       --                Physical Exam    Nursing note and vitals reviewed.  Constitutional: He appears well-developed and well-nourished. He is not diaphoretic. No distress.   HENT:   Head: Normocephalic and atraumatic.   Mouth/Throat: Oropharynx is clear and moist.   Dried blood to the left nare, no active bleeding   Eyes: Conjunctivae and EOM are normal. Pupils are equal, round, and reactive to light.   Neck: Trachea normal. Neck supple.   Normal range of motion.  Cardiovascular:   Normal rate, regular rhythm, normal heart sounds and intact distal pulses.     Exam reveals no gallop and no friction rub.       No murmur heard.  Pulmonary/Chest: Breath sounds normal. No respiratory distress. He has no wheezes. He has no rhonchi. He has no rales. He exhibits no tenderness.   Abdominal: Abdomen is soft. Bowel sounds are normal. He exhibits no distension and no mass. There is no abdominal tenderness. There is no rebound.   Musculoskeletal:         General: No tenderness or edema. Normal range of motion.      Cervical back: Normal range of motion and neck supple.      Lumbar back: Normal. No tenderness. Normal range of motion.     Neurological: He is alert and oriented to person, place, and time. He has normal strength. No cranial nerve deficit or sensory deficit.   Skin: Skin is warm and dry. Capillary refill takes less than 2 seconds. No rash and no abscess noted. No erythema. No pallor.   Psychiatric: He has a normal mood and affect. His behavior is normal. Judgment and thought content normal.         ED Course   Epistaxis Mgmt    Date/Time: 9/30/2023 10:58 PM    Performed by: Indiana Nugent MD  Authorized by: Indiana Nugent MD  Consent Done: Yes  Consent: Verbal consent obtained.  Consent given by: patient  Patient understanding: patient states understanding of the procedure being performed    Patient sedated: no  Treatment site: left anterior  Repair method: oxymetazoline and silver nitrate  Post-procedure assessment: bleeding stopped  Treatment complexity: simple  Patient tolerance: Patient tolerated the procedure well with no immediate complications        Labs Reviewed   PROTIME-INR - Abnormal; Notable for the following components:       Result Value    PT 28.4 (*)     INR 2.7 (*)     All other components within normal limits   CBC WITH DIFFERENTIAL - Abnormal; Notable for the following components:    RBC 3.44 (*)     Hgb 10.4 (*)     Hct 32.6 (*)     MCV 94.8 (*)     MCHC 31.9 (*)      All other components within normal limits   CBC W/ AUTO DIFFERENTIAL    Narrative:     The following orders were created for panel order CBC auto differential.  Procedure                               Abnormality         Status                     ---------                               -----------         ------                     CBC with Differential[[id number]]       Abnormal            Final result                 Please view results for these tests on the individual orders.   TYPE & SCREEN          Imaging Results    None          Medications   silver nitrate applicators applicator 1 applicator (has no administration in time range)   oxymetazoline 0.05 % nasal spray 1 spray (1 spray Each Nostril Given 9/30/23 0255)   silver nitrate applicators applicator 1 applicator (1 applicator Topical (Top) Given 9/30/23 2319)     Medical Decision Making  The differential diagnosis includes, but is not limited to: epistaxis, anemia, supratherapeutic INR.    Amount and/or Complexity of Data Reviewed  Labs: ordered.    Risk  OTC drugs.  Prescription drug management.            Scribe Attestation:   Scribe #1: I performed the above scribed service and the documentation accurately describes the services I performed. I attest to the accuracy of the note.  Comments: Attending:   Physician Attestation Statement for Scribe #1: I, Indiana Nugent MD, personally performed the services described in this documentation. All medical record entries made by the scribe were at my direction and in my presence.  I have reviewed the chart and agree that the record reflects my personal performance and is accurate and complete.        Attending Attestation:           Physician Attestation for Scribe:  Physician Attestation Statement for Scribe #1: I, Indiana Nugent MD, reviewed documentation, as scribed by Dany De Oliveira in my presence, and it is both accurate and complete.             ED Course as of 09/30/23 6827   Sat Sep 30, 2023    2331 Control of epistaxis, cauterized, will obs and dc if no return [BS]      ED Course User Index  [BS] Indiana Nugent MD               Medical Decision Making:   History:   Old Medical Records: I decided to obtain old medical records.  Old Records Summarized: records from clinic visits.       <> Summary of Records: Recurrent epistaxis  Initial Assessment:   See hpi  Clinical Tests:   Lab Tests: Ordered and Reviewed      Clinical Impression:   Final diagnoses:  [R04.0] Recurrent epistaxis (Primary)  [Z79.01] Chronic anticoagulation        ED Disposition Condition    Discharge Stable          ED Prescriptions    None       Follow-up Information       Follow up With Specialties Details Why Contact Info    Jono Russell MD Otolaryngology Schedule an appointment as soon as possible for a visit   [address] [facility] [address]  [phone]      Devon Langston Jr., MD Internal Medicine Schedule an appointment as soon as possible for a visit   [address]  [place] [address]  [phone]      [facility] - Emergency Dept Emergency Medicine  As needed, If symptoms worsen [address] [facility] [address]  [phone]             Indiana Nugent MD  09/30/23 2331

## 2023-10-02 ENCOUNTER — TELEPHONE (OUTPATIENT)
Dept: NEUROSURGERY | Facility: CLINIC | Age: 83
End: 2023-10-02
Payer: MEDICARE

## 2023-10-02 ENCOUNTER — OFFICE VISIT (OUTPATIENT)
Dept: OTOLARYNGOLOGY | Facility: CLINIC | Age: 83
End: 2023-10-02
Payer: MEDICARE

## 2023-10-02 VITALS
DIASTOLIC BLOOD PRESSURE: 68 MMHG | HEART RATE: 66 BPM | BODY MASS INDEX: 28.76 KG/M2 | SYSTOLIC BLOOD PRESSURE: 147 MMHG | WEIGHT: 172.81 LBS

## 2023-10-02 DIAGNOSIS — N18.32 STAGE 3B CHRONIC KIDNEY DISEASE: ICD-10-CM

## 2023-10-02 DIAGNOSIS — R04.0 RECURRENT EPISTAXIS: Primary | ICD-10-CM

## 2023-10-02 DIAGNOSIS — Z79.01 ANTICOAGULANT LONG-TERM USE: ICD-10-CM

## 2023-10-02 PROCEDURE — 1159F PR MEDICATION LIST DOCUMENTED IN MEDICAL RECORD: ICD-10-PCS | Mod: CPTII,S$GLB,, | Performed by: STUDENT IN AN ORGANIZED HEALTH CARE EDUCATION/TRAINING PROGRAM

## 2023-10-02 PROCEDURE — 1160F RVW MEDS BY RX/DR IN RCRD: CPT | Mod: CPTII,S$GLB,, | Performed by: STUDENT IN AN ORGANIZED HEALTH CARE EDUCATION/TRAINING PROGRAM

## 2023-10-02 PROCEDURE — 3078F PR MOST RECENT DIASTOLIC BLOOD PRESSURE < 80 MM HG: ICD-10-PCS | Mod: CPTII,S$GLB,, | Performed by: STUDENT IN AN ORGANIZED HEALTH CARE EDUCATION/TRAINING PROGRAM

## 2023-10-02 PROCEDURE — 99999 PR PBB SHADOW E&M-EST. PATIENT-LVL III: CPT | Mod: PBBFAC,,, | Performed by: STUDENT IN AN ORGANIZED HEALTH CARE EDUCATION/TRAINING PROGRAM

## 2023-10-02 PROCEDURE — 1126F PR PAIN SEVERITY QUANTIFIED, NO PAIN PRESENT: ICD-10-PCS | Mod: CPTII,S$GLB,, | Performed by: STUDENT IN AN ORGANIZED HEALTH CARE EDUCATION/TRAINING PROGRAM

## 2023-10-02 PROCEDURE — 3072F PR LOW RISK FOR RETINOPATHY: ICD-10-PCS | Mod: CPTII,S$GLB,, | Performed by: STUDENT IN AN ORGANIZED HEALTH CARE EDUCATION/TRAINING PROGRAM

## 2023-10-02 PROCEDURE — 31231 NASAL ENDOSCOPY DX: CPT | Mod: S$GLB,,, | Performed by: STUDENT IN AN ORGANIZED HEALTH CARE EDUCATION/TRAINING PROGRAM

## 2023-10-02 PROCEDURE — 1101F PR PT FALLS ASSESS DOC 0-1 FALLS W/OUT INJ PAST YR: ICD-10-PCS | Mod: CPTII,S$GLB,, | Performed by: STUDENT IN AN ORGANIZED HEALTH CARE EDUCATION/TRAINING PROGRAM

## 2023-10-02 PROCEDURE — 3072F LOW RISK FOR RETINOPATHY: CPT | Mod: CPTII,S$GLB,, | Performed by: STUDENT IN AN ORGANIZED HEALTH CARE EDUCATION/TRAINING PROGRAM

## 2023-10-02 PROCEDURE — 3077F SYST BP >= 140 MM HG: CPT | Mod: CPTII,S$GLB,, | Performed by: STUDENT IN AN ORGANIZED HEALTH CARE EDUCATION/TRAINING PROGRAM

## 2023-10-02 PROCEDURE — 1160F PR REVIEW ALL MEDS BY PRESCRIBER/CLIN PHARMACIST DOCUMENTED: ICD-10-PCS | Mod: CPTII,S$GLB,, | Performed by: STUDENT IN AN ORGANIZED HEALTH CARE EDUCATION/TRAINING PROGRAM

## 2023-10-02 PROCEDURE — 3288F PR FALLS RISK ASSESSMENT DOCUMENTED: ICD-10-PCS | Mod: CPTII,S$GLB,, | Performed by: STUDENT IN AN ORGANIZED HEALTH CARE EDUCATION/TRAINING PROGRAM

## 2023-10-02 PROCEDURE — 99999 PR PBB SHADOW E&M-EST. PATIENT-LVL III: ICD-10-PCS | Mod: PBBFAC,,, | Performed by: STUDENT IN AN ORGANIZED HEALTH CARE EDUCATION/TRAINING PROGRAM

## 2023-10-02 PROCEDURE — 3077F PR MOST RECENT SYSTOLIC BLOOD PRESSURE >= 140 MM HG: ICD-10-PCS | Mod: CPTII,S$GLB,, | Performed by: STUDENT IN AN ORGANIZED HEALTH CARE EDUCATION/TRAINING PROGRAM

## 2023-10-02 PROCEDURE — 99213 OFFICE O/P EST LOW 20 MIN: CPT | Mod: 25,S$GLB,, | Performed by: STUDENT IN AN ORGANIZED HEALTH CARE EDUCATION/TRAINING PROGRAM

## 2023-10-02 PROCEDURE — 1159F MED LIST DOCD IN RCRD: CPT | Mod: CPTII,S$GLB,, | Performed by: STUDENT IN AN ORGANIZED HEALTH CARE EDUCATION/TRAINING PROGRAM

## 2023-10-02 PROCEDURE — 3078F DIAST BP <80 MM HG: CPT | Mod: CPTII,S$GLB,, | Performed by: STUDENT IN AN ORGANIZED HEALTH CARE EDUCATION/TRAINING PROGRAM

## 2023-10-02 PROCEDURE — 1101F PT FALLS ASSESS-DOCD LE1/YR: CPT | Mod: CPTII,S$GLB,, | Performed by: STUDENT IN AN ORGANIZED HEALTH CARE EDUCATION/TRAINING PROGRAM

## 2023-10-02 PROCEDURE — 1126F AMNT PAIN NOTED NONE PRSNT: CPT | Mod: CPTII,S$GLB,, | Performed by: STUDENT IN AN ORGANIZED HEALTH CARE EDUCATION/TRAINING PROGRAM

## 2023-10-02 PROCEDURE — 31231 PR NASAL ENDOSCOPY, DX: ICD-10-PCS | Mod: S$GLB,,, | Performed by: STUDENT IN AN ORGANIZED HEALTH CARE EDUCATION/TRAINING PROGRAM

## 2023-10-02 PROCEDURE — 3288F FALL RISK ASSESSMENT DOCD: CPT | Mod: CPTII,S$GLB,, | Performed by: STUDENT IN AN ORGANIZED HEALTH CARE EDUCATION/TRAINING PROGRAM

## 2023-10-02 PROCEDURE — 99213 PR OFFICE/OUTPT VISIT, EST, LEVL III, 20-29 MIN: ICD-10-PCS | Mod: 25,S$GLB,, | Performed by: STUDENT IN AN ORGANIZED HEALTH CARE EDUCATION/TRAINING PROGRAM

## 2023-10-02 NOTE — TELEPHONE ENCOUNTER
Return call to wife. Pt started bleeding and is asking to schedule the embolization. Explained will need to speak with . Once it is scheduled will let them know.

## 2023-10-02 NOTE — TELEPHONE ENCOUNTER
----- Message from Vale Marin sent at 10/2/2023  9:22 AM CDT -----  Regarding: Patient Advice  Contact: Ameena [phone]  Ameena/ wife is calling to speak to nurse  states it is to set up surgery please call

## 2023-10-02 NOTE — PROGRESS NOTES
Subjective:      Dariel is a 83 y.o. male who comes for follow-up of  epistaxis  .  His last visit with me was on 9/19/23.  He is s/p SPA ligation on 6/14/23. Embo procedure not scheduled yet. Was in the ED at [place] for left epistaxis. AgNO3 was attempted by t still had some continued left sided oozing. Here today for evaluation. Reports daily bleeding episodes on left, however not currently bleeding.     Still on coumadin, INR within therapeutic range.     His current sinus regime consists of: Nasal saline.     The patient's medications, allergies, past medical, surgical, social and family histories were reviewed and updated as appropriate.    A detailed review of systems was obtained with pertinent positives as per the above HPI, and otherwise negative.        Objective:     BP (!) 147/68 (BP Location: Left arm, Patient Position: Sitting, BP Method: Large (Automatic))   Pulse 66   Wt 78.4 kg (172 lb 13.5 oz)   BMI 28.76 kg/m²        Constitutional:   Vital signs are normal. He appears well-developed and well-nourished.     Head:  Normocephalic and atraumatic.     Ears:  Hearing normal to normal and whispered voice; external ear normal without scars, lesions, or masses; ear canal, tympanic membrane, and middle ear normal..   Right Ear: No swelling. Tympanic membrane is not perforated and not bulging. No middle ear effusion.   Left Ear: No swelling. Tympanic membrane is not perforated and not bulging.  No middle ear effusion.     Nose:  Nose normal including turbinates, nasal mucosa, sinuses and nasal septum. No epistaxis.     Mouth/Throat  Oropharynx clear and moist without lesions or asymmetry and normal uvula midline. Normal dentition. No tonsillar abscesses. Tonsillar exudate.      Neck:  Neck normal without thyromegaly masses, asymmetry, normal tracheal structure, crepitus, and tenderness, thyroid normal, trachea normal, phonation normal, full range of motion with neck supple and no adenopathy. No  stridor present.        Head (right side): No submental adenopathy present.        Head (left side): No submental adenopathy present.     He has no cervical adenopathy.     Pulmonary/Chest:   No stridor.       Procedure    Nasal Endoscopy:  10/2/2023    The use of diagnostic nasal endoscopy was considered medically necessary for the evaluation and visualization of the nasal anatomy for symptoms suggestive of nasal or sinus origin. Physical examination (including a nasal speculum evaluation) did not provide sufficient clinical information to establish a diagnosis, or symptoms did not improve or worsened following treatment.     The nasal cavity was decongested with topical 1% phenylephrine and anesthetized with 4% lidocaine.  A rigid 0-degree endoscope was introduced into the nasal cavity.    The patient was seated in the examination chair. After discussion of risks and benefits, a nasal endoscope was inserted into the nose the endoscope was passed along the left nasal floor to the nasopharynx. It was then passed between the middle and superior meatus, nasal turbinates, nasal septum, nasopharynx and sphenoethmoid region. The nasal endoscope was withdrawn and there was no complications. An identical procedure was performed on the right side. I was present for the entire procedure.The patient tolerated the above procedure well. The findings of this procedure can be found in the dictated note from 10/2/2023 visit.      Old blood clots removed from maxillary and nasal cavity on left. Diffuse oozing along the septum. Topical aftin applied that stopped bleeding. Novapak placed on the left.     Data Reviewed    WBC   Date Value   09/30/2023 6.65 x10(3)/mcL   06/14/2023 5.22 K/uL     Eosinophil % (%)   Date Value   06/14/2023 2.1     Eos # (K/uL)   Date Value   06/14/2023 0.1     Platelets (K/uL)   Date Value   06/14/2023 158     Platelet (x10(3)/mcL)   Date Value   09/30/2023 155     Glucose (mg/dL)   Date Value  "  04/18/2023 100     No results found for: "IGE"    No sinus imaging available.      Assessment:     1. Recurrent epistaxis    2. Anticoagulant long-term use    3. Stage 3b chronic kidney disease        Plan:     - Dissolvable pack placed on left  - F/u after embo  - Cont afrin PRN  - Do not blow nose for 1 week.  Sneeze with mouth open.  - Do not take ibuprofen or aspirin for 1 week.  - Place saline nasal gel in nostrils at bedtime.  - May use saline nose drops during the day to prevent dryness.  - If bleeding recurs, use oxymetazoline (Afrin) spray in the nostril and call for follow-up appointment.    Jono Russell MD  "

## 2023-10-03 NOTE — TELEPHONE ENCOUNTER
Spoke with Dr. Emanuel. Plan embo on 10/05. Spoke with wife. Pt to get labs done tomorrow. Will call back with time of procedure. Wife verbalized understanding.

## 2023-10-03 NOTE — PROGRESS NOTES
10/03/23 Wife called to report the Patient is scheduled to have nose surgery 10/05 by Dr Reshma Fu, reports the Patient was in ER at [facility] 9/30 with nose bleed and INR done at ER was 2.7, had follow up appointment with Dr Jono Russell 10/02, Patient is scheduled for labs today and Patrick MoranD gave ok for a PT/INR order to be linked to those labs to be drawn today 10/03 , wife reports no order has been given by  to hold warfarin yet

## 2023-10-04 ENCOUNTER — ANTI-COAG VISIT (OUTPATIENT)
Dept: CARDIOLOGY | Facility: CLINIC | Age: 83
End: 2023-10-04
Payer: MEDICARE

## 2023-10-04 ENCOUNTER — TELEPHONE (OUTPATIENT)
Dept: NEUROSURGERY | Facility: CLINIC | Age: 83
End: 2023-10-04
Payer: MEDICARE

## 2023-10-04 DIAGNOSIS — Z95.2 H/O MECHANICAL AORTIC VALVE REPLACEMENT: ICD-10-CM

## 2023-10-04 DIAGNOSIS — Z79.01 ANTICOAGULANT LONG-TERM USE: Primary | ICD-10-CM

## 2023-10-04 PROCEDURE — 93793 PR ANTICOAGULANT MGMT FOR PT TAKING WARFARIN: ICD-10-PCS | Mod: S$GLB,,,

## 2023-10-04 PROCEDURE — 93793 ANTICOAG MGMT PT WARFARIN: CPT | Mod: S$GLB,,,

## 2023-10-04 NOTE — TELEPHONE ENCOUNTER
Called and spoke with pts wife. Instructed will need to be at hospital on SSCU for 1230. Will need someone to drive him home.  Nothing to eat after midnight. Wife verbalizes understanding.

## 2023-10-05 ENCOUNTER — HOSPITAL ENCOUNTER (INPATIENT)
Dept: INTERVENTIONAL RADIOLOGY/VASCULAR | Facility: HOSPITAL | Age: 83
LOS: 1 days | Discharge: HOME | DRG: 982 | End: 2023-10-06
Attending: NEUROLOGICAL SURGERY | Admitting: NEUROLOGICAL SURGERY
Payer: MEDICARE

## 2023-10-05 ENCOUNTER — ANESTHESIA (OUTPATIENT)
Dept: INTERVENTIONAL RADIOLOGY/VASCULAR | Facility: HOSPITAL | Age: 83
DRG: 982 | End: 2023-10-05
Payer: MEDICARE

## 2023-10-05 DIAGNOSIS — R04.0 RECURRENT EPISTAXIS: Primary | ICD-10-CM

## 2023-10-05 PROCEDURE — 63600175 PHARM REV CODE 636 W HCPCS: Mod: HCNC | Performed by: NURSE ANESTHETIST, CERTIFIED REGISTERED

## 2023-10-05 PROCEDURE — 25000003 PHARM REV CODE 250: Mod: HCNC

## 2023-10-05 PROCEDURE — D9220A PRA ANESTHESIA: ICD-10-PCS | Mod: HCNC,ANES,, | Performed by: ANESTHESIOLOGY

## 2023-10-05 PROCEDURE — 11000001 HC ACUTE MED/SURG PRIVATE ROOM: Mod: HCNC

## 2023-10-05 PROCEDURE — 36227 PLACE CATH XTRNL CAROTID: CPT | Mod: HCNC,LT,, | Performed by: NEUROLOGICAL SURGERY

## 2023-10-05 PROCEDURE — 36222 PR ANGIO XTRCRANL ART+/- CERVIOCEREBRAL ARCH, CAROTID/INNOM ART, SELECTV CATH , S&I: ICD-10-PCS | Mod: HCNC,59,RT, | Performed by: NEUROLOGICAL SURGERY

## 2023-10-05 PROCEDURE — 25000003 PHARM REV CODE 250: Mod: HCNC | Performed by: NURSE ANESTHETIST, CERTIFIED REGISTERED

## 2023-10-05 PROCEDURE — 61624 TCAT PERM OCCLS/EMBOLJ CNS: CPT | Mod: HCNC | Performed by: NEUROLOGICAL SURGERY

## 2023-10-05 PROCEDURE — 37000009 HC ANESTHESIA EA ADD 15 MINS: Mod: HCNC

## 2023-10-05 PROCEDURE — 75894 PR  TRANSCATHETER RX EMBOLIZATN: ICD-10-PCS | Mod: 26,HCNC,, | Performed by: NEUROLOGICAL SURGERY

## 2023-10-05 PROCEDURE — 37000008 HC ANESTHESIA 1ST 15 MINUTES: Mod: HCNC

## 2023-10-05 PROCEDURE — 36224 PLACE CATH CAROTD ART: CPT | Mod: HCNC,51,LT, | Performed by: NEUROLOGICAL SURGERY

## 2023-10-05 PROCEDURE — 36224 PLACE CATH CAROTD ART: CPT | Mod: HCNC | Performed by: NEUROLOGICAL SURGERY

## 2023-10-05 PROCEDURE — D9220A PRA ANESTHESIA: Mod: HCNC,ANES,, | Performed by: ANESTHESIOLOGY

## 2023-10-05 PROCEDURE — 75898 PR  ANGIOGRAM,F/U STUDY,CATH THER/EMBOL/INF: ICD-10-PCS | Mod: 26,HCNC,, | Performed by: NEUROLOGICAL SURGERY

## 2023-10-05 PROCEDURE — 61624 IR ANGIOGRAM CAROTID INTERNAL INC ARCH AND CEREBRAL BILAT: ICD-10-PCS | Mod: HCNC,,, | Performed by: NEUROLOGICAL SURGERY

## 2023-10-05 PROCEDURE — 75898 FOLLOW-UP ANGIOGRAPHY: CPT | Mod: 26,HCNC,, | Performed by: NEUROLOGICAL SURGERY

## 2023-10-05 PROCEDURE — 61624 TCAT PERM OCCLS/EMBOLJ CNS: CPT | Mod: HCNC,,, | Performed by: NEUROLOGICAL SURGERY

## 2023-10-05 PROCEDURE — 27201076 IR ANGIOGRAM CAROTID INTERNAL INC ARCH AND CEREBRAL BILAT: Mod: HCNC

## 2023-10-05 PROCEDURE — 36222 PLACE CATH CAROTID/INOM ART: CPT | Mod: HCNC,59,RT, | Performed by: NEUROLOGICAL SURGERY

## 2023-10-05 PROCEDURE — C1729 CATH, DRAINAGE: HCPCS | Mod: HCNC

## 2023-10-05 PROCEDURE — 75898 FOLLOW-UP ANGIOGRAPHY: CPT | Mod: TC,HCNC | Performed by: NEUROLOGICAL SURGERY

## 2023-10-05 PROCEDURE — 36224 PR ANGIO INTRCRNL ART +/- CERVIOCEREBRAL ARCH, INTRNL CAROTID ART, SELECTV CATH ,S&I: ICD-10-PCS | Mod: HCNC,51,LT, | Performed by: NEUROLOGICAL SURGERY

## 2023-10-05 PROCEDURE — 36227 PR ANGIO XTRNL CAROTD CIRC, XTRNL CAROTID, SELECTV CATH S&I: ICD-10-PCS | Mod: HCNC,LT,, | Performed by: NEUROLOGICAL SURGERY

## 2023-10-05 PROCEDURE — D9220A PRA ANESTHESIA: ICD-10-PCS | Mod: HCNC,CRNA,, | Performed by: NURSE ANESTHETIST, CERTIFIED REGISTERED

## 2023-10-05 PROCEDURE — 75894 X-RAYS TRANSCATH THERAPY: CPT | Mod: 26,HCNC,, | Performed by: NEUROLOGICAL SURGERY

## 2023-10-05 PROCEDURE — 36222 PLACE CATH CAROTID/INOM ART: CPT | Mod: HCNC | Performed by: NEUROLOGICAL SURGERY

## 2023-10-05 PROCEDURE — D9220A PRA ANESTHESIA: Mod: HCNC,CRNA,, | Performed by: NURSE ANESTHETIST, CERTIFIED REGISTERED

## 2023-10-05 PROCEDURE — 75894 X-RAYS TRANSCATH THERAPY: CPT | Mod: TC,HCNC | Performed by: NEUROLOGICAL SURGERY

## 2023-10-05 PROCEDURE — 36227 PLACE CATH XTRNL CAROTID: CPT | Mod: HCNC | Performed by: NEUROLOGICAL SURGERY

## 2023-10-05 RX ORDER — VASOPRESSIN 20 [USP'U]/ML
INJECTION, SOLUTION INTRAMUSCULAR; SUBCUTANEOUS
Status: DISCONTINUED | OUTPATIENT
Start: 2023-10-05 | End: 2023-10-05

## 2023-10-05 RX ORDER — HYDROCODONE BITARTRATE AND ACETAMINOPHEN 5; 325 MG/1; MG/1
1 TABLET ORAL EVERY 6 HOURS PRN
Status: DISCONTINUED | OUTPATIENT
Start: 2023-10-05 | End: 2023-10-06 | Stop reason: HOSPADM

## 2023-10-05 RX ORDER — FENTANYL CITRATE 50 UG/ML
25 INJECTION, SOLUTION INTRAMUSCULAR; INTRAVENOUS EVERY 5 MIN PRN
Status: DISCONTINUED | OUTPATIENT
Start: 2023-10-05 | End: 2023-10-06 | Stop reason: HOSPADM

## 2023-10-05 RX ORDER — FENTANYL CITRATE 50 UG/ML
INJECTION, SOLUTION INTRAMUSCULAR; INTRAVENOUS
Status: DISCONTINUED | OUTPATIENT
Start: 2023-10-05 | End: 2023-10-05

## 2023-10-05 RX ORDER — ONDANSETRON 2 MG/ML
INJECTION INTRAMUSCULAR; INTRAVENOUS
Status: DISCONTINUED | OUTPATIENT
Start: 2023-10-05 | End: 2023-10-05

## 2023-10-05 RX ORDER — SODIUM CHLORIDE 0.9 % (FLUSH) 0.9 %
10 SYRINGE (ML) INJECTION
Status: DISCONTINUED | OUTPATIENT
Start: 2023-10-05 | End: 2023-10-06 | Stop reason: HOSPADM

## 2023-10-05 RX ORDER — DEXAMETHASONE SODIUM PHOSPHATE 4 MG/ML
INJECTION, SOLUTION INTRA-ARTICULAR; INTRALESIONAL; INTRAMUSCULAR; INTRAVENOUS; SOFT TISSUE
Status: DISCONTINUED | OUTPATIENT
Start: 2023-10-05 | End: 2023-10-05

## 2023-10-05 RX ORDER — LIDOCAINE HYDROCHLORIDE 20 MG/ML
INJECTION INTRAVENOUS
Status: DISCONTINUED | OUTPATIENT
Start: 2023-10-05 | End: 2023-10-05

## 2023-10-05 RX ORDER — SODIUM CHLORIDE 9 MG/ML
INJECTION, SOLUTION INTRAVENOUS CONTINUOUS
Status: DISCONTINUED | OUTPATIENT
Start: 2023-10-05 | End: 2023-10-06

## 2023-10-05 RX ORDER — PHENYLEPHRINE HYDROCHLORIDE 10 MG/ML
INJECTION INTRAVENOUS
Status: DISCONTINUED | OUTPATIENT
Start: 2023-10-05 | End: 2023-10-05

## 2023-10-05 RX ORDER — ROCURONIUM BROMIDE 10 MG/ML
INJECTION, SOLUTION INTRAVENOUS
Status: DISCONTINUED | OUTPATIENT
Start: 2023-10-05 | End: 2023-10-05

## 2023-10-05 RX ORDER — PROPOFOL 10 MG/ML
VIAL (ML) INTRAVENOUS
Status: DISCONTINUED | OUTPATIENT
Start: 2023-10-05 | End: 2023-10-05

## 2023-10-05 RX ORDER — LIDOCAINE HYDROCHLORIDE 10 MG/ML
1 INJECTION, SOLUTION EPIDURAL; INFILTRATION; INTRACAUDAL; PERINEURAL ONCE
Status: DISCONTINUED | OUTPATIENT
Start: 2023-10-05 | End: 2023-10-06 | Stop reason: HOSPADM

## 2023-10-05 RX ORDER — LIDOCAINE AND PRILOCAINE 25; 25 MG/G; MG/G
CREAM TOPICAL ONCE
Status: COMPLETED | OUTPATIENT
Start: 2023-10-05 | End: 2023-10-05

## 2023-10-05 RX ADMIN — ROCURONIUM BROMIDE 50 MG: 10 INJECTION, SOLUTION INTRAVENOUS at 06:10

## 2023-10-05 RX ADMIN — LIDOCAINE AND PRILOCAINE: 25; 25 CREAM TOPICAL at 01:10

## 2023-10-05 RX ADMIN — SODIUM CHLORIDE: 9 INJECTION, SOLUTION INTRAVENOUS at 11:10

## 2023-10-05 RX ADMIN — VASOPRESSIN 1 UNITS: 20 INJECTION INTRAVENOUS at 06:10

## 2023-10-05 RX ADMIN — SUGAMMADEX 200 MG: 100 INJECTION, SOLUTION INTRAVENOUS at 07:10

## 2023-10-05 RX ADMIN — DEXAMETHASONE SODIUM PHOSPHATE 4 MG: 4 INJECTION, SOLUTION INTRAMUSCULAR; INTRAVENOUS at 06:10

## 2023-10-05 RX ADMIN — VASOPRESSIN 1 UNITS: 20 INJECTION INTRAVENOUS at 07:10

## 2023-10-05 RX ADMIN — PROPOFOL 150 MG: 10 INJECTION, EMULSION INTRAVENOUS at 06:10

## 2023-10-05 RX ADMIN — FENTANYL CITRATE 50 MCG: 50 INJECTION, SOLUTION INTRAMUSCULAR; INTRAVENOUS at 06:10

## 2023-10-05 RX ADMIN — SODIUM CHLORIDE 0.3 MCG/KG/MIN: 9 INJECTION, SOLUTION INTRAVENOUS at 06:10

## 2023-10-05 RX ADMIN — GLYCOPYRROLATE 0.2 MG: 0.2 INJECTION, SOLUTION INTRAMUSCULAR; INTRAVENOUS at 07:10

## 2023-10-05 RX ADMIN — ONDANSETRON 4 MG: 2 INJECTION INTRAMUSCULAR; INTRAVENOUS at 07:10

## 2023-10-05 RX ADMIN — PHENYLEPHRINE HYDROCHLORIDE 200 MCG: 10 INJECTION INTRAVENOUS at 06:10

## 2023-10-05 RX ADMIN — LIDOCAINE HYDROCHLORIDE 80 MG: 20 INJECTION INTRAVENOUS at 06:10

## 2023-10-05 NOTE — PLAN OF CARE
Pt arrived to [facility] for nose bleed embo. Pt oriented to unit and staff. Plan of care reviewed with patient, patient verbalizes understanding. Comfort measures utilized. Pt safely transferred from stretcher to procedural table. Fall risk reviewed with patient, fall risk interventions maintained. Safety strap applied, positioner pillows utilized to minimize pressure points. Blankets applied. Pt prepped and draped utilizing standard sterile technique. Patient placed on continuous monitoring, as required by sedation policy. Timeouts completed utilizing standard universal time-out, per department and facility policy. RN to remain at bedside, continuous monitoring maintained. Pt resting comfortably. Denies pain/discomfort. Will continue to monitor. See flow sheets for monitoring, medication administration, and updates.

## 2023-10-05 NOTE — ANESTHESIA PROCEDURE NOTES
Intubation    Date/Time: 10/5/2023 6:14 PM    Performed by: Peggy Bailey CRNA  Authorized by: Josué Condon MD    Intubation:     Induction:  Rapid sequence induction    Intubated:  Postinduction    Mask Ventilation:  Not attempted    Attempts:  1    Attempted By:  CRNA    Method of Intubation:  Video laryngoscopy    Blade:  Osorio 3    Laryngeal View Grade: Grade I - full view of cords      Difficult Airway Encountered?: No      Complications:  None    Airway Device:  Oral endotracheal tube    Airway Device Size:  7.5    Style/Cuff Inflation:  Cuffed (inflated to minimal occlusive pressure)    Tube secured:  23    Secured at:  The lips    Placement Verified By:  Capnometry    Complicating Factors:  None    Findings Post-Intubation:  BS equal bilateral and atraumatic/condition of teeth unchanged

## 2023-10-05 NOTE — ANESTHESIA PREPROCEDURE EVALUATION
10/05/2023  Pre-operative evaluation for * No procedures listed *    Dariel Urbina is a 83 y.o. male patient on anticoagulation for heart valve and repeated epistaxis presenting for procedure.  Hx of dm, ckdiv,      The left ventricle is normal in size with normal systolic function.The estimated ejection fraction is 60%.   Normal right ventricular size with normal right ventricular systolic function.   Moderate mitral regurgitation.   There is a mechanical aortic valve present. There is no aortic insufficiency present.   Plaque present in the transverse aorta.      The Ost LAD lesion was 50% stenosed.    The Ost Cx to Mid Cx lesion was 100% stenosed.    The Mid LAD lesion was 100% stenosed.    The estimated blood loss was none.        Patient Active Problem List   Diagnosis    Anticoagulant long-term use    Hyperlipidemia associated with type 2 diabetes mellitus    History of colon resection    Renovascular hypertension    History of gout    H/O mechanical aortic valve replacement    Atherosclerosis of native artery of extremity with intermittent claudication    Type 2 diabetes mellitus with diabetic peripheral angiopathy without gangrene    Bilateral carotid artery disease    Pulmonary heart disease    Metabolic acidosis with normal anion gap and bicarbonate losses    Acute anterior epistaxis    Gastrointestinal hemorrhage associated with intestinal diverticulosis    Hx of colonic polyp    Hypertension associated with diabetes    Bilateral carpal tunnel syndrome    Numbness and tingling in both hands    Severe carpal tunnel syndrome of right wrist    Atherosclerosis of native coronary artery of native heart without angina pectoris    Anemia    Chronic kidney disease (CKD), stage IV (severe)    Syncope    Recurrent epistaxis    Supratherapeutic INR    Coronary artery  disease involving native coronary artery of native heart without angina pectoris    Chronic combined systolic and diastolic heart failure    Paroxysmal atrial fibrillation    Mitral insufficiency    Purpura    Aortic calcification       Review of patient's allergies indicates:   Allergen Reactions    Lisinopril     Losartan      Intolerance- elevates potassium level       Current Outpatient Medications on File Prior to Encounter   Medication Sig Dispense Refill    allopurinoL (ZYLOPRIM) 100 MG tablet Take 1 tablet (100 mg total) by mouth once daily. 90 tablet 3    bumetanide (BUMEX) 1 MG tablet Take 1 tablet (1 mg total) by mouth every other day. 45 tablet 3    ferrous gluconate (FERGON) 324 MG tablet Take 1 tablet (324 mg total) by mouth daily with breakfast. 90 tablet 1    isosorbide mononitrate (IMDUR) 60 MG 24 hr tablet Take 1 tablet (60 mg total) by mouth every evening. 90 tablet 3    metoprolol succinate (TOPROL-XL) 25 MG 24 hr tablet Take 1 tablet (25 mg total) by mouth once daily. 90 tablet 3    oxymetazoline (AFRIN) 0.05 % nasal spray Use 2 sprays by Nasal route daily as needed (nose bleed). 30 mL 0    rosuvastatin (CRESTOR) 40 MG Tab TAKE 1 TABLET EVERY EVENING. 90 tablet 3    warfarin (COUMADIN) 5 MG tablet Take warfarin 7.5mg (1.5 tablets) PO Tuesday & Saturday, then take 5mg PO all other days or as instructed by Coumadin Clinic 45 tablet 3    albuterol (VENTOLIN HFA) 90 mcg/actuation inhaler Inhale 2 puffs into the lungs every 6 (six) hours as needed for Wheezing. Rescue 18 g 1    enoxaparin (LOVENOX) 80 mg/0.8 mL Syrg Inject 0.7 mLs (70 mg total) into the skin Daily. 8 mL 1    loperamide (IMODIUM) 2 mg capsule Take 2 mg by mouth 4 (four) times daily as needed for Diarrhea.      omega-3 fatty acids/fish oil (FISH OIL-OMEGA-3 FATTY ACIDS) 300-1,000 mg capsule Take 1 capsule by mouth once daily.       Current Facility-Administered Medications on File Prior to Encounter   Medication Dose  Route Frequency Provider Last Rate Last Admin    fentaNYL 50 mcg/mL injection 25 mcg  25 mcg Intravenous Q5 Min PRN Saeed Farrell MD        haloperidol lactate injection 0.5 mg  0.5 mg Intravenous Q10 Min PRN Saeed Farrell MD        HYDROmorphone injection 0.2 mg  0.2 mg Intravenous Q5 Min PRN Saeed Farrell MD        sodium chloride 0.9% flush 10 mL  10 mL Intravenous PRN Saeed Farrell MD           Past Surgical History:   Procedure Laterality Date    BACK SURGERY      CARDIAC CATHETERIZATION      CARDIAC VALVE REPLACEMENT      CARDIAC VALVE SURGERY      CARPAL TUNNEL RELEASE Right 05/19/2020    Procedure: RELEASE, CARPAL TUNNEL;  Surgeon: Rupesh Norris Jr., MD;  Location: [facility];  Service: Plastics;  Laterality: Right;    CATARACT EXTRACTION Left 11/13/2022        COLON SURGERY      COLONOSCOPY N/A 03/31/2017    Procedure: COLONOSCOPY;  Surgeon: Bruno Raymond MD;  Location: [facility] (4TH FLR);  Service: Endoscopy;  Laterality: N/A;  Patient's wife requesting date.    COLONOSCOPY N/A 04/03/2018    Procedure: COLONOSCOPY;  Surgeon: Bonifacio Pelletier MD;  Location: [facility] (2ND FLR);  Service: Endoscopy;  Laterality: N/A;    COLONOSCOPY N/A 08/13/2018    Procedure: COLONOSCOPY;  Surgeon: Kam Barba MD;  Location: [facility] (2ND FLR);  Service: Endoscopy;  Laterality: N/A;  2nd floor: PA pressure 49; hx of moderate-severe valve disease     per Coumadin clinic-Patient can hold 5 days with lovenox bridge       ok to schedule per Katarina    CORONARY ANGIOGRAPHY N/A 10/04/2021    Procedure: Left heart cath +/- peripheral angiogram;  Surgeon: Jose Ruiz MD;  Location: [facility] CATH LAB;  Service: Cardiology;  Laterality: N/A;    CORONARY ANGIOGRAPHY N/A 3/2/2023    Procedure: Angiogram, Coronary;  Surgeon: Devon Garnica MD;  Location: [facility] CATH LAB;  Service: Cardiology;  Laterality: N/A;    CORONARY ANGIOPLASTY      CORONARY ARTERY BYPASS GRAFT      CORONARY BYPASS GRAFT  ANGIOGRAPHY  10/04/2021    Procedure: Bypass graft study;  Surgeon: Jose Ruiz MD;  Location: [facility] CATH LAB;  Service: Cardiology;;    CORONARY BYPASS GRAFT ANGIOGRAPHY  3/2/2023    Procedure: Bypass graft study;  Surgeon: Devon Garnica MD;  Location: [facility] CATH LAB;  Service: Cardiology;;    ECHOCARDIOGRAM,TRANSESOPHAGEAL N/A 4/14/2023    Procedure: Transesophageal echo (KIRSTEN) intra-procedure log documentation;  Surgeon: Thuan Diagnostic Provider;  Location: [facility] EP LAB;  Service: Cardiology;  Laterality: N/A;    EYE SURGERY      FUNCTIONAL ENDOSCOPIC SINUS SURGERY (FESS) Bilateral 6/14/2023    Procedure: FESS (FUNCTIONAL ENDOSCOPIC SINUS SURGERY);  Surgeon: Joon Russell MD;  Location: [facility] OR 2ND FLR;  Service: ENT;  Laterality: Bilateral;    HERNIA REPAIR      INTRAOCULAR PROSTHESES INSERTION Left 11/13/2022    Procedure: INSERTION, IOL PROSTHESIS;  Surgeon: Alia Mckeon MD;  [facility];  Service: Ophthalmology;  Laterality: Left;    INTRAOCULAR PROSTHESES INSERTION Right 12/04/2022    Procedure: INSERTION, IOL PROSTHESIS;  Surgeon: Alia Mckeon MD;  [facility];  Service: Ophthalmology;  Laterality: Right;    LIGATION, ARTERY, SPHENOPALATINE, ENDOSCOPIC Bilateral 6/14/2023    Procedure: LIGATION, ARTERY, SPHENOPALATINE, ENDOSCOPIC;  Surgeon: Jono Russell MD;  Location: [facility] OR 2ND FLR;  Service: ENT;  Laterality: Bilateral;    PHACOEMULSIFICATION OF CATARACT Left 11/13/2022    Procedure: PHACOEMULSIFICATION, CATARACT;  Surgeon: Alia Mckeon MD;  Location: [facility] OR 1ST FLR;  Service: Ophthalmology;  Laterality: Left;    PHACOEMULSIFICATION OF CATARACT Right 12/04/2022    Procedure: PHACOEMULSIFICATION, CATARACT;  Surgeon: Alia Mckeon MD;  [facility];  Service: Ophthalmology;  Laterality: Right;    SPINE SURGERY      TRANSESOPHAGEAL ECHOCARDIOGRAPHY N/A 3/22/2023    Procedure: ECHOCARDIOGRAM, TRANSESOPHAGEAL;  Surgeon: Zenia  Diagnostic Provider;  Location: [facility] EP LAB;  Service: Anesthesiology;  Laterality: N/A;    TRANSESOPHAGEAL ECHOCARDIOGRAPHY N/A 2023    Procedure: ECHOCARDIOGRAM, TRANSESOPHAGEAL;  Surgeon: Zenia Diagnostic Provider;  Location: [facility] EP LAB;  Service: Anesthesiology;  Laterality: N/A;    VASECTOMY         Social History     Socioeconomic History    Marital status:      Spouse name: Ameena    Number of children: 3   Occupational History    Occupation: retired   Tobacco Use    Smoking status: Former     Current packs/day: 0.00     Average packs/day: 1 pack/day for 20.0 years (20.0 ttl pk-yrs)     Types: Cigarettes     Start date: 1960     Quit date: 1980     Years since quittin.0     Passive exposure: Never    Smokeless tobacco: Never   Substance and Sexual Activity    Alcohol use: No    Drug use: No    Sexual activity: Not Currently     Partners: Female     Social Determinants of Health     Financial Resource Strain: Low Risk  (2023)    Overall Financial Resource Strain (CARDIA)     Difficulty of Paying Living Expenses: Not hard at all   Food Insecurity: No Food Insecurity (2023)    Hunger Vital Sign     Worried About Running Out of Food in the Last Year: Never true     Ran Out of Food in the Last Year: Never true   Transportation Needs: No Transportation Needs (2023)    PRAPARE - Transportation     Lack of Transportation (Medical): No     Lack of Transportation (Non-Medical): No   Physical Activity: Inactive (2023)    Exercise Vital Sign     Days of Exercise per Week: 0 days     Minutes of Exercise per Session: 0 min   Stress: No Stress Concern Present (2023)    [employer] - Occupational Stress Questionnaire     Feeling of Stress : Not at all   Social Connections: Moderately Isolated (2023)    Social Connection and Isolation Panel [NHANES]     Frequency of Communication with Friends and Family: Once a week      "Frequency of Social Gatherings with Friends and Family: More than three times a week     Attends Religion Services: Never     Active Member of Clubs or Organizations: No     Attends Club or Organization Meetings: Never     Marital Status:    Housing Stability: Low Risk  (2023)    Housing Stability Vital Sign     Unable to Pay for Housing in the Last Year: No     Number of Places Lived in the Last Year: 1     Unstable Housing in the Last Year: No         CBC: No results for input(s): "WBC", "RBC", "HGB", "HCT", "PLT", "MCV", "MCH", "MCHC" in the last 72 hours.    CMP:   Recent Labs     10/03/23  1501      K 5.0   *   CO2 22*   BUN 60*   CREATININE 2.6*   *   CALCIUM 10.2       INR  Recent Labs     10/03/23  1501   INR 3.6*           Diagnostic Studies:      EKD Echo:  Results for orders placed or performed during the hospital encounter of 18   2D Echo w/ Color Flow Doppler   Result Value Ref Range    EF + QEF 50 55 - 65    Mitral Valve Regurgitation MODERATE TO SEVERE (A)     Aortic Valve Regurgitation TRIVIAL     Est. PA Systolic Pressure 48.7 (A)     Tricuspid Valve Regurgitation MODERATE (A)            Pre-op Assessment    I have reviewed the Patient Summary Reports.     I have reviewed the Nursing Notes. I have reviewed the NPO Status.   I have reviewed the Medications.     Review of Systems  Anesthesia Hx:  No problems with previous Anesthesia  History of prior surgery of interest to airway management or planning: Denies Family Hx of Anesthesia complications.   Denies Personal Hx of Anesthesia complications.   Hematology/Oncology:         -- Anemia:   Cardiovascular:   Hypertension CAD   CHF ECG has been reviewed.    Pulmonary:   Denies COPD.  Denies Asthma.    Renal/:   Chronic Renal Disease, CKD    Hepatic/GI:   GERD    Neurological:   Denies CVA. Denies Seizures.    Endocrine:   Diabetes        Physical Exam  General: Well nourished, Cooperative, Alert " and Oriented    Airway:  Mallampati: III / II  Mouth Opening: Normal  TM Distance: Normal  Tongue: Normal  Neck ROM: Normal ROM    Dental:  Intact    Chest/Lungs:  Normal Respiratory Rate    Heart:  Rate: Normal  Rhythm: Regular Rhythm        Anesthesia Plan  Type of Anesthesia, risks & benefits discussed:    Anesthesia Type: Gen ETT  Post Op Pain Control Plan: multimodal analgesia  Induction:  IV  Airway Plan: Video, Post-Induction  Informed Consent: Informed consent signed with the Patient and all parties understand the risks and agree with anesthesia plan.  All questions answered.   ASA Score: 3  Day of Surgery Review of History & Physical: H&P Update referred to the surgeon/provider.    Ready For Surgery From Anesthesia Perspective.     .

## 2023-10-05 NOTE — PLAN OF CARE
Pt ambulated on unit with his wife at his side.  Denies pain or SOB.  Verbalized an understanding of procedure.  Oriented to unit and call bell provided.  Will continue to monitor.

## 2023-10-06 VITALS
TEMPERATURE: 97 F | BODY MASS INDEX: 29.09 KG/M2 | WEIGHT: 174.63 LBS | HEART RATE: 73 BPM | DIASTOLIC BLOOD PRESSURE: 63 MMHG | HEIGHT: 65 IN | SYSTOLIC BLOOD PRESSURE: 137 MMHG | OXYGEN SATURATION: 96 % | RESPIRATION RATE: 16 BRPM

## 2023-10-06 LAB
ERYTHROCYTE [DISTWIDTH] IN BLOOD BY AUTOMATED COUNT: 14.7 % (ref 11.5–14.5)
HCT VFR BLD AUTO: 28.4 % (ref 40–54)
HGB BLD-MCNC: 8.9 G/DL (ref 14–18)
INR PPP: 2.2 (ref 0.8–1.2)
MCH RBC QN AUTO: 30.5 PG (ref 27–31)
MCHC RBC AUTO-ENTMCNC: 31.3 G/DL (ref 32–36)
MCV RBC AUTO: 97 FL (ref 82–98)
PLATELET # BLD AUTO: 170 K/UL (ref 150–450)
PMV BLD AUTO: 10.7 FL (ref 9.2–12.9)
PROTHROMBIN TIME: 22.4 SEC (ref 9–12.5)
RBC # BLD AUTO: 2.92 M/UL (ref 4.6–6.2)
WBC # BLD AUTO: 6.61 K/UL (ref 3.9–12.7)

## 2023-10-06 PROCEDURE — 36415 COLL VENOUS BLD VENIPUNCTURE: CPT | Mod: HCNC | Performed by: PHYSICIAN ASSISTANT

## 2023-10-06 PROCEDURE — 85027 COMPLETE CBC AUTOMATED: CPT | Mod: HCNC | Performed by: STUDENT IN AN ORGANIZED HEALTH CARE EDUCATION/TRAINING PROGRAM

## 2023-10-06 PROCEDURE — 99024 POSTOP FOLLOW-UP VISIT: CPT | Mod: HCNC,,, | Performed by: PHYSICIAN ASSISTANT

## 2023-10-06 PROCEDURE — 25000003 PHARM REV CODE 250: Mod: HCNC | Performed by: PHYSICIAN ASSISTANT

## 2023-10-06 PROCEDURE — 99024 PR POST-OP FOLLOW-UP VISIT: ICD-10-PCS | Mod: HCNC,,, | Performed by: PHYSICIAN ASSISTANT

## 2023-10-06 PROCEDURE — 85610 PROTHROMBIN TIME: CPT | Mod: HCNC | Performed by: PHYSICIAN ASSISTANT

## 2023-10-06 RX ORDER — ALLOPURINOL 100 MG/1
100 TABLET ORAL DAILY
Status: DISCONTINUED | OUTPATIENT
Start: 2023-10-06 | End: 2023-10-06 | Stop reason: HOSPADM

## 2023-10-06 RX ORDER — BUMETANIDE 1 MG/1
1 TABLET ORAL EVERY OTHER DAY
Status: DISCONTINUED | OUTPATIENT
Start: 2023-10-06 | End: 2023-10-06 | Stop reason: HOSPADM

## 2023-10-06 RX ORDER — METOPROLOL SUCCINATE 25 MG/1
25 TABLET, EXTENDED RELEASE ORAL DAILY
Status: DISCONTINUED | OUTPATIENT
Start: 2023-10-06 | End: 2023-10-06 | Stop reason: HOSPADM

## 2023-10-06 RX ORDER — FERROUS GLUCONATE 324(37.5)
324 TABLET ORAL
Status: DISCONTINUED | OUTPATIENT
Start: 2023-10-06 | End: 2023-10-06 | Stop reason: HOSPADM

## 2023-10-06 RX ORDER — ATORVASTATIN CALCIUM 40 MG/1
80 TABLET, FILM COATED ORAL DAILY
Status: DISCONTINUED | OUTPATIENT
Start: 2023-10-06 | End: 2023-10-06 | Stop reason: HOSPADM

## 2023-10-06 RX ORDER — ALBUTEROL SULFATE 90 UG/1
2 AEROSOL, METERED RESPIRATORY (INHALATION) EVERY 6 HOURS PRN
Status: DISCONTINUED | OUTPATIENT
Start: 2023-10-06 | End: 2023-10-06 | Stop reason: HOSPADM

## 2023-10-06 RX ORDER — LOPERAMIDE HYDROCHLORIDE 2 MG/1
2 CAPSULE ORAL 4 TIMES DAILY PRN
Status: DISCONTINUED | OUTPATIENT
Start: 2023-10-06 | End: 2023-10-06 | Stop reason: HOSPADM

## 2023-10-06 RX ORDER — ISOSORBIDE MONONITRATE 30 MG/1
60 TABLET, EXTENDED RELEASE ORAL NIGHTLY
Status: DISCONTINUED | OUTPATIENT
Start: 2023-10-06 | End: 2023-10-06 | Stop reason: HOSPADM

## 2023-10-06 RX ORDER — WARFARIN SODIUM 5 MG/1
5 TABLET ORAL DAILY
Status: DISCONTINUED | OUTPATIENT
Start: 2023-10-06 | End: 2023-10-06 | Stop reason: HOSPADM

## 2023-10-06 RX ADMIN — METOPROLOL SUCCINATE 25 MG: 25 TABLET, EXTENDED RELEASE ORAL at 09:10

## 2023-10-06 RX ADMIN — ALLOPURINOL 100 MG: 100 TABLET ORAL at 09:10

## 2023-10-06 RX ADMIN — ATORVASTATIN CALCIUM 80 MG: 40 TABLET, FILM COATED ORAL at 09:10

## 2023-10-06 RX ADMIN — Medication 324 MG: at 09:10

## 2023-10-06 NOTE — PLAN OF CARE
Problem: Adult Inpatient Plan of Care  Goal: Plan of Care Review  Outcome: Met  Goal: Patient-Specific Goal (Individualized)  Outcome: Met  Goal: Absence of Hospital-Acquired Illness or Injury  Outcome: Met  Goal: Optimal Comfort and Wellbeing  Outcome: Met  Goal: Readiness for Transition of Care  Outcome: Met     Problem: Diabetes Comorbidity  Goal: Blood Glucose Level Within Targeted Range  Outcome: Met     Problem: Fall Injury Risk  Goal: Absence of Fall and Fall-Related Injury  Outcome: Met     Patient has met all requirements and is ready for discharge. Patient will be discharged home today.

## 2023-10-06 NOTE — BRIEF OP NOTE
Procedural Date:  10/5/23    Attending:  Reshma Fu MD    Fellow(s):  Teddy Garcia MD    Preoperative Diagnoses:   Left nose epistaxis     Postoperative Diagnosis:  Same; s/p sphenopalatine artery embolization     Procedure:   1-vessel cerebral angiogram with sphenopalatine embolization     Written Informed Consent Obtained:   Yes     Specimen Removed:   None     Estimated Blood Loss:  Minimal     Brief Procedure report:   A 5 Mauritian sheath was placed into the right common femoral artery and a 5 Mauritian Ángel diagnostic catheter was advanced into the aortic arch.  The L common carotid & L external carotid arteries were subselected and angiography of the brain was performed. At this point a Headway 156 microcatheter was advanced into the left sphenopalatine branch. A combination of coil and Lang 34 embolization was used to successfully embolize the artery. Pre- and post-embolization runs demonstrated patency of all intracranial and orbital branches. There is no other evidence of aneurysm, fistula, malformation, or significant stenoocclusive disease.  A common femoral artery angiogram was performed, the sheath removed and hemostasis achieved via manual pressure.  No hematoma was present at the time of hemostasis.   The patient tolerated the procedure well.     Preliminary Interpretation:   1.    Left sphenopalentine artery coil & Lang embolization  2.    Otherwise normal cerebral angiogram.     (Please see Imaging report for full details)    Disposition:  Home vs PACU

## 2023-10-06 NOTE — PROGRESS NOTES
Patient ready for discharge. Paperwork reviewed with patient and family and all questions answered. Patients IV removed and patient ready for transport.

## 2023-10-06 NOTE — HPI
Dariel Urbina is a 83 y.o. male with PMH of recurrent epistaxis from the L nostril who presents for elective sphenopalatine artery embolization.     The patient has been examined and the recent H&P has been reviewed:     I concur with the findings and no changes have occurred since H&P below was written.         Per recent clinic visit with Dr. Fu on 9/18/23:    Patient is an 83-year-old man, past medical history of CKD, stage III, CHF, anemia of chronic kidney disease previous NSTEMI, with mechanical heart valve on anticoagulation.  Also has the history of type 2 diabetes with peripheral angiopathy, neuropathy and previous gangrene, with a history of hyperparathyroidism secondary to chronic renal disease.    He also has a more recent history of epistaxis.  Previously seen common referred to me by my ENT/skull base colleague, Dr. Jono Russell.  He previously saw Dr. Russell on 07/18/2023, then again on 08/15/2023.  He noted to continue to have epistaxis, mostly left-sided.  He previously underwent sphenopalatine artery ligation on 06/14/2023.  Initially bleeding was controlled, but now continues to have more frequent bleeding.  He continues to be on Coumadin.  Currently his sinus regimen consists of primarily nasal saline.       Patient notes that he continues to have epistaxis.  He has not had any in the past 3 days.  But knows normally he has epistaxis almost daily.  Almost exclusively under the right Ch.  He notes occasionally does have a right nose.  He says he feels now more that he feels it in the back of his throat as opposed to through the nasal cavity.  He notes that occasionally when he coughs he has dark blood clots that come up.  He denies any difficulty breathing or difficulty with swallowing.  He is on Coumadin secondary to mechanical heart valve replacement.  He is here secondary to referral by Dr. Russell for possible embolization of the sphenopalatine artery.

## 2023-10-06 NOTE — HOSPITAL COURSE
Patient underwent 1-vessel cerebral angiogram with successful embolization of the L sphenopalatine artery on 10/5. No complications. Manual pressure was held to R groin site and leg was immobilized x 6 hours post-procedure. Pt was admitted for observation overnight. He remained neurologically intact and HDS. No signs of puncture site hematoma or bleeding, palpable distal pulses. INR 2.2, consulted with Pharmacy, instructed to continue Coumadin at dose of 5 mg daily and follow up in Coumadin clinic on 10/9. He was discharged home in good condition on 10/6.

## 2023-10-06 NOTE — H&P
Interval H&P:      Dariel Urbina is a 83 y.o. male with PMH of recurrent epistaxis from the L nostril who presents for elective sphenopalatine artery embolization.    The patient has been examined and the recent H&P has been reviewed:    I concur with the findings and no changes have occurred since H&P below was written.       Per recent clinic visit with Dr. Fu on 9/18/23:    Neurosurgery  History & Physical     SUBJECTIVE:      Chief Complaint:  Epistaxis     History of Present Illness:  Patient is an 83-year-old man, past medical history of CKD, stage III, CHF, anemia of chronic kidney disease previous NSTEMI, with mechanical heart valve on anticoagulation.  Also has the history of type 2 diabetes with peripheral angiopathy, neuropathy and previous gangrene, with a history of hyperparathyroidism secondary to chronic renal disease.    He also has a more recent history of epistaxis.  Previously seen common referred to me by my ENT/skull base colleague, Dr. Jono Russell.  He previously saw Dr. Russell on 07/18/2023, then again on 08/15/2023.  He noted to continue to have epistaxis, mostly left-sided.  He previously underwent sphenopalatine artery ligation on 06/14/2023.  Initially bleeding was controlled, but now continues to have more frequent bleeding.  He continues to be on Coumadin.  Currently his sinus regimen consists of primarily nasal saline.       Patient notes that he continues to have epistaxis.  He has not had any in the past 3 days.  But knows normally he has epistaxis almost daily.  Almost exclusively under the right Ch.  He notes occasionally does have a right nose.  He says he feels now more that he feels it in the back of his throat as opposed to through the nasal cavity.  He notes that occasionally when he coughs he has dark blood clots that come up.  He denies any difficulty breathing or difficulty with swallowing.  He is on Coumadin secondary to mechanical heart valve replacement.  He  is here secondary to referral by Dr. Russell for possible embolization of the sphenopalatine artery.                   Review of patient's allergies indicates:   Allergen Reactions    Fosinopril         Intolerance- elevates potassium level      Losartan         Intolerance- elevates potassium level         Current Medications          Current Outpatient Medications   Medication Sig Dispense Refill    allopurinoL (ZYLOPRIM) 100 MG tablet Take 1 tablet (100 mg total) by mouth once daily. 90 tablet 3    bumetanide (BUMEX) 1 MG tablet Take 1 tablet (1 mg total) by mouth every other day. 45 tablet 3    ferrous gluconate (FERGON) 324 MG tablet Take 1 tablet (324 mg total) by mouth daily with breakfast. 90 tablet 1    isosorbide mononitrate (IMDUR) 60 MG 24 hr tablet Take 1 tablet (60 mg total) by mouth every evening. 90 tablet 3    metoprolol succinate (TOPROL-XL) 25 MG 24 hr tablet Take 1 tablet (25 mg total) by mouth once daily. 90 tablet 3    omega-3 fatty acids/fish oil (FISH OIL-OMEGA-3 FATTY ACIDS) 300-1,000 mg capsule Take 1 capsule by mouth once daily.        rosuvastatin (CRESTOR) 40 MG Tab TAKE 1 TABLET EVERY EVENING. 90 tablet 3    warfarin (COUMADIN) 5 MG tablet warfarin 7.5 (1.5 tablets) sun and Thursday, and 5mg other days. warfarin 7.5 (1.5 tablets) sun and Thursday, and 5mg other days 30 tablet 11    albuterol (VENTOLIN HFA) 90 mcg/actuation inhaler Inhale 2 puffs into the lungs every 6 (six) hours as needed for Wheezing. Rescue (Patient not taking: Reported on 8/15/2023) 18 g 1    enoxaparin (LOVENOX) 80 mg/0.8 mL Syrg Inject 0.7 mLs (70 mg total) into the skin Daily. (Patient not taking: Reported on 9/18/2023) 8 mL 1    loperamide (IMODIUM) 2 mg capsule Take 2 mg by mouth 4 (four) times daily as needed for Diarrhea.        oxymetazoline (AFRIN) 0.05 % nasal spray Use 2 sprays by Nasal route daily as needed (nose bleed). (Patient not taking: Reported on 9/18/2023) 30 mL 0      No current  facility-administered medications for this visit.                Facility-Administered Medications Ordered in Other Visits   Medication Dose Route Frequency Provider Last Rate Last Admin    fentaNYL 50 mcg/mL injection 25 mcg  25 mcg Intravenous Q5 Min PRSaeed Servin MD        haloperidol lactate injection 0.5 mg  0.5 mg Intravenous Q10 Min PRSaeed Servin MD        HYDROmorphone injection 0.2 mg  0.2 mg Intravenous Q5 Min PRSaeed Servin MD        sodium chloride 0.9% flush 10 mL  10 mL Intravenous PRN Saeed Farrell MD                     Past Medical History:   Diagnosis Date    Anemia of chronic renal failure, stage 3 (moderate) 05/27/2015    Anticoagulant long-term use      Atherosclerosis of coronary artery bypass graft of native heart without angina pectoris 09/11/2012     3-27-18 [facility] Two vessel coronary artery disease.   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    Bilateral carotid artery disease 02/09/2017    Bleeding from the nose      Bleeding nose 03/21/2018    Cancer      Cataract      CHF (congestive heart failure)      CKD (chronic kidney disease) stage 3, GFR 30-59 ml/min 05/27/2015    Claudication of left lower extremity 09/17/2014    Colon polyp      Encounter for blood transfusion      Gastroesophageal reflux disease without esophagitis 03/19/2018    Gastrointestinal hemorrhage associated with intestinal diverticulosis 04/01/2018    Glaucoma      H/O mechanical aortic valve replacement 09/17/2014    History of gout 09/26/2012    Hyperparathyroidism due to renal insufficiency 07/27/2015    Internal hemorrhoid 04/03/2018    Long term current use of anticoagulant therapy 09/26/2012    Mechanical heart valve present      Metabolic acidosis with normal anion gap and bicarbonate losses 03/20/2018    Mixed hyperlipidemia 09/26/2012    NSTEMI (non-ST elevated myocardial infarction) 03/21/2018    Obesity, diabetes, and hypertension syndrome 02/23/2016    Paroxysmal atrial  fibrillation 02/06/2023    PVD (peripheral vascular disease) 09/11/2012    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 02/01/2023     vertex scalp    Syncope 09/29/2022    Type 2 diabetes mellitus with diabetic peripheral angiopathy without gangrene 05/27/2015    Type 2 diabetes mellitus with stage 3 chronic kidney disease, without long-term current use of insulin 10/02/2013            Past Surgical History:   Procedure Laterality Date    BACK SURGERY        CARDIAC CATHETERIZATION        CARDIAC VALVE REPLACEMENT        CARDIAC VALVE SURGERY        CARPAL TUNNEL RELEASE Right 05/19/2020     Procedure: RELEASE, CARPAL TUNNEL;  Surgeon: Rupesh Norris Jr., MD;  Location: [facility];  Service: Plastics;  Laterality: Right;    CATARACT EXTRACTION Left 11/13/2022         COLON SURGERY        COLONOSCOPY N/A 03/31/2017     Procedure: COLONOSCOPY;  Surgeon: Bruno Raymond MD;  Location: [facility] (4TH FLR);  Service: Endoscopy;  Laterality: N/A;  Patient's wife requesting date.    COLONOSCOPY N/A 04/03/2018     Procedure: COLONOSCOPY;  Surgeon: Bonifacio Pelletier MD;  Location: [facility] (2ND FLR);  Service: Endoscopy;  Laterality: N/A;    COLONOSCOPY N/A 08/13/2018     Procedure: COLONOSCOPY;  Surgeon: Kam Barba MD;  Location: [facility] (2ND FLR);  Service: Endoscopy;  Laterality: N/A;  2nd floor: PA pressure 49; hx of moderate-severe valve disease     per Coumadin clinic-Patient can hold 5 days with lovenox bridge       ok to schedule per Katarina    CORONARY ANGIOGRAPHY N/A 10/04/2021     Procedure: Left heart cath +/- peripheral angiogram;  Surgeon: Jose Ruiz MD;  Location: [facility] CATH LAB;  Service: Cardiology;  Laterality: N/A;    CORONARY ANGIOGRAPHY N/A 3/2/2023     Procedure: Angiogram, Coronary;  Surgeon: Devon Garnica MD;  Location: [facility] CATH LAB;  Service: Cardiology;  Laterality: N/A;    CORONARY ANGIOPLASTY        CORONARY ARTERY BYPASS GRAFT         CORONARY BYPASS GRAFT ANGIOGRAPHY   10/04/2021     Procedure: Bypass graft study;  Surgeon: Jose Ruiz MD;  Location: [facility] CATH LAB;  Service: Cardiology;;    CORONARY BYPASS GRAFT ANGIOGRAPHY   3/2/2023     Procedure: Bypass graft study;  Surgeon: Devon Garnica MD;  Location: [facility] CATH LAB;  Service: Cardiology;;    ECHOCARDIOGRAM,TRANSESOPHAGEAL N/A 4/14/2023     Procedure: Transesophageal echo (KIRSTEN) intra-procedure log documentation;  Surgeon: [facility] Diagnostic Provider;  Location: [facility] EP LAB;  Service: Cardiology;  Laterality: N/A;    EYE SURGERY        FUNCTIONAL ENDOSCOPIC SINUS SURGERY (FESS) Bilateral 6/14/2023     Procedure: FESS (FUNCTIONAL ENDOSCOPIC SINUS SURGERY);  Surgeon: Jono Russell MD;  [facility];  Service: ENT;  Laterality: Bilateral;    HERNIA REPAIR        INTRAOCULAR PROSTHESES INSERTION Left 11/13/2022     Procedure: INSERTION, IOL PROSTHESIS;  Surgeon: Alia Mckeon MD;  [facility];  Service: Ophthalmology;  Laterality: Left;    INTRAOCULAR PROSTHESES INSERTION Right 12/04/2022     Procedure: INSERTION, IOL PROSTHESIS;  Surgeon: Alia Mckeon MD;  [facility];  Service: Ophthalmology;  Laterality: Right;    LIGATION, ARTERY, SPHENOPALATINE, ENDOSCOPIC Bilateral 6/14/2023     Procedure: LIGATION, ARTERY, SPHENOPALATINE, ENDOSCOPIC;  Surgeon: Jono Russell MD;  [facility];  Service: ENT;  Laterality: Bilateral;    PHACOEMULSIFICATION OF CATARACT Left 11/13/2022     Procedure: PHACOEMULSIFICATION, CATARACT;  Surgeon: Alia Mckeon MD;  [facility];  Service: Ophthalmology;  Laterality: Left;    PHACOEMULSIFICATION OF CATARACT Right 12/04/2022     Procedure: PHACOEMULSIFICATION, CATARACT;  Surgeon: Alia Mckeon MD;  [facility];  Service: Ophthalmology;  Laterality: Right;    SPINE SURGERY        TRANSESOPHAGEAL ECHOCARDIOGRAPHY N/A 3/22/2023     Procedure: ECHOCARDIOGRAM,  TRANSESOPHAGEAL;  Surgeon: [facility] Diagnostic Provider;  Location: [facility] EP LAB;  Service: Anesthesiology;  Laterality: N/A;    TRANSESOPHAGEAL ECHOCARDIOGRAPHY N/A 2023     Procedure: ECHOCARDIOGRAM, TRANSESOPHAGEAL;  Surgeon: [facility] Diagnostic Provider;  Location: [facility] EP LAB;  Service: Anesthesiology;  Laterality: N/A;    VASECTOMY          Family History         Problem Relation (Age of Onset)     Alcohol abuse Father     Diabetes Brother     Heart disease Mother, Father, Brother, Sister     Heart failure Mother, Father, Brother     No Known Problems Sister, Maternal Grandmother, Maternal Grandfather, Paternal Grandmother, Paternal Grandfather, Maternal Aunt, Maternal Uncle, Paternal Aunt, Paternal Uncle             Social History            Socioeconomic History    Marital status:        Spouse name: Ameena    Number of children: 3   Occupational History    Occupation: retired   Tobacco Use    Smoking status: Former       Current packs/day: 0.00       Average packs/day: 1 pack/day for 20.0 years (20.0 ttl pk-yrs)       Types: Cigarettes       Start date: 1960       Quit date: 1980       Years since quittin.0       Passive exposure: Never    Smokeless tobacco: Never   Substance and Sexual Activity    Alcohol use: No    Drug use: No    Sexual activity: Not Currently       Partners: Female      Social Determinants of Health           Financial Resource Strain: Low Risk  (2023)     Overall Financial Resource Strain (CARDIA)      Difficulty of Paying Living Expenses: Not hard at all   Food Insecurity: No Food Insecurity (2023)     Hunger Vital Sign      Worried About Running Out of Food in the Last Year: Never true      Ran Out of Food in the Last Year: Never true   Transportation Needs: No Transportation Needs (2023)     PRAPARE - Transportation      Lack of Transportation (Medical): No      Lack of Transportation (Non-Medical): No   Physical Activity: Inactive (2023)      Exercise Vital Sign      Days of Exercise per Week: 0 days      Minutes of Exercise per Session: 0 min   Stress: No Stress Concern Present (7/26/2023)     [employer] - Occupational Stress Questionnaire      Feeling of Stress : Not at all   Social Connections: Moderately Isolated (7/26/2023)     Social Connection and Isolation Panel [NHANES]      Frequency of Communication with Friends and Family: Once a week      Frequency of Social Gatherings with Friends and Family: More than three times a week      Attends Denominational Services: Never      Active Member of Clubs or Organizations: No      Attends Club or Organization Meetings: Never      Marital Status:    Housing Stability: Low Risk  (7/26/2023)     Housing Stability Vital Sign      Unable to Pay for Housing in the Last Year: No      Number of Places Lived in the Last Year: 1      Unstable Housing in the Last Year: No         Review of Systems     OBJECTIVE:      Vital Signs  Temp: 97.8 °F (36.6 °C)  Pulse: 84  BP: (!) 156/65  Pain Score: 0-No pain  There is no height or weight on file to calculate BMI.        Neurosurgery Physical Exam  General: no acute distress  Head: Non-traumatic, normocephalic     Nose:  Petechiae on the nose, around the perioral area on the right.    There is normal-appearing mucosa in the right nasal cavity.  Left nasal cavity areas some fullness reddening of the nasal mucosa with crusted blood along the nasal orifice.  Eyes: Pupils equal, EOMI  Neck: Supple, normal ROM, no tenderness to palpation  CVS: Normal rate and rhythm, distal pulses present  Pulm: Symmetric expansion, no respiratory distress  GI: Abdomen nondistended, nontender     MSK: Moves all extremities without restriction, atraumatic  Skin: Dry, intact  Psych: Normal thought content and cognition     Neuro:  Alert, awake, oriented, to self, place, time  Language:  Speech is fluent, goal directed without any noted dysarthria or aphasia      Cranial nerves:    CNII-XII: Intact on fine exam,   Pupils equal round react to light,   Extraocular muscles are intact  V1 to V3 is intact to light touch,   no facial asymmetry,   Hearing is intact to finger rub and voice  tongue/uvula/palate midline,   shoulder shrug equal,      No pronator drift     Extremities:  Motor:           Upper Extremity     Deltoid Triceps Biceps Wrist  Extension Wrist  Flexion Interosseous   R 5/5 5/5 5/5 5/5 5/5 5/5   L 5/5 5/5 5/5 5/5 5/5 5/5         Thumb   Abduction Thumb  ADDuction Finger  Flexion Finger  Extension       R 5/5 5/5 5/5 5/5       L 5/5 5/5 5/5 5/5           Lower Extremity      Iliopsoas Quadriceps Hamstring Plantarflexion Dorsiflexion EHL   R 5/5 5/5 5/5 5/5 5/5 5/5   L 5/5 5/5 5/5 5/5 5/5 5/5         Reflexes:     DTR:    2+ biceps                       2+ patellar     Smith's: Negative        Sensory:      Sensation intact to light touch,      Coordination:      Coordination intact  Cerebellar:  Normal finger-to-nose        Diagnostic Results:  No diagnostic imaging to review     ASSESSMENT/PLAN:      83-year-old man with recurrent epistaxis, previous transnasal sphenopalatine artery ligation      1. Patient with no focal neurologic deficits on examination.  There is some crusting around the left nasal cavity orifice nasal orifice.       2. Had long discussion with the patient.  Would recommend given patient continues to have daily/intermittent epistaxis.  Would recommend exploration and further embolization of the sphenopalatine artery or any lateralized vessels that may be contributing to additional and further epistaxis.  I discussed with the patient risks, benefits, and alternatives to the procedure including not limited to heart attack coma, stroke, infection, bleeding, paralysis, even death.  Informed consent was obtained and secured in chart after the patient voiced understanding these risks and decided proceed with the procedure.  Patient would like to  proceed with the sphenopalatine artery embolization after returns from his trip to [place] with the spouse.     Thank you so very much for allowing me to participate in the care of this patient.  Please feel free to call any questions, comments, concerns.     Reshma Fu MD,MSc  Department of Neurosurgery   Department of Radiology  Department of Neurology  [facility]    [facility]   [employer]        Assessment/Plan:    --Patient NPO since MN  --Proceed with angiogram with embolization of sphenopalatine artery +/- any additional lateralized vessels  --Surgery risks, benefits and alternative options discussed and understood by patient/family.  --Patient consented and all questions answered  --Further reccs to follow procedure    Keira Castillo PA-C  Neurosurgery  [facility]    ----

## 2023-10-06 NOTE — NURSING TRANSFER
Nursing Transfer Note      10/5/2023   10:02 PM    Nurse giving handoff:RADHA Arango PACU  Nurse receiving handoff:RADHA Trent NPU    Reason patient is being transferred: Med/Surge Care on Neurosurgery Unit    Transfer To: 930 from PACU    Transfer via stretcher    Transfer with O2    Transported by PCT    Transfer Vital Signs:  Blood Pressure:134/65  Heart Rate:70  O2:94 w 2L O2  Temperature:98.1  Respirations:22    Order for Tele Monitor? No    Any special needs or follow-up needed: Patient HOB flat for 4 hours ending 10/6/23 00:05    Patient belongings transferred with patient: Yes    Chart send with patient: Yes    Notified: spouse    Patient reassessed at: 10/5/23 21:50

## 2023-10-06 NOTE — NURSING
Nurses Note -- 4 Eyes      10/5/2023   11:13 PM      Skin assessed during: Admit      [x] No Altered Skin Integrity Present    [x]Prevention Measures Documented      [] Yes- Altered Skin Integrity Present or Discovered   [] LDA Added if Not in Epic (Describe Wound)   [] New Altered Skin Integrity was Present on Admit and Documented in LDA   [] Wound Image Taken    Wound Care Consulted? No    Attending Nurse:  RADHA Godoy    Second RN/Staff Member:   RADHA Welch

## 2023-10-06 NOTE — PLAN OF CARE
[facility] (Hospital)  Discharge Final Note    Primary Care Provider: Devon Langston Jr., MD    Expected Discharge Date: 10/6/2023    Patient to be discharged home.  The patient does not have any home needs. Family to provide transportation home.  Neurosurgery clinic to schedule follow up appointment.    Future Appointments   Date Time Provider Department Center   10/10/2023  3:00 PM LAB, APPOINTMENT [facility] IM [facility]   10/19/2023  9:30 AM Marilee Hays, RADHA [facility] NEUROS8 [facility]   10/20/2023 10:00 AM Jono Russell MD [facility] HNSO [facility]   11/1/2023  8:00 AM Devon Langston Jr., MD [facility]   11/7/2023  7:00 AM LAB, APPOINTMENT [facility] IM [facility]   11/14/2023  2:00 PM Reshma Fu MD [facility] NEUROS8 [facility]   11/28/2023  9:30 AM Cody Aranda MD [facility] CARDIO [facility]        Final Discharge Note (most recent)       Final Note - 10/06/23 1244          Final Note    Assessment Type Final Discharge Note     Anticipated Discharge Disposition Home or Self Care        Post-Acute Status    Post-Acute Authorization Other     Other Status No Post-Acute Service Needs     Discharge Delays None known at this time                     Important Message from Medicare

## 2023-10-06 NOTE — TRANSFER OF CARE
"Anesthesia Transfer of Care Note    Patient: Dariel Urbina    Procedure(s) Performed: * No procedures listed *    Patient location: PACU    Anesthesia Type: general    Transport from OR: Transported from OR on 6-10 L/min O2 by face mask with adequate spontaneous ventilation    Post pain: adequate analgesia    Post assessment: no apparent anesthetic complications    Post vital signs: stable    Level of consciousness: awake    Nausea/Vomiting: no nausea/vomiting    Complications: none    Transfer of care protocol was followed      Last vitals:   Visit Vitals  BP (!) 114/55 (BP Location: Right arm, Patient Position: Lying)   Pulse 73   Temp 36.5 °C (97.7 °F) (Temporal)   Resp (!) 21   Ht 5' 5" (1.651 m)   Wt 78 kg (172 lb)   SpO2 96%   BMI 28.62 kg/m²     "

## 2023-10-06 NOTE — PROGRESS NOTES
Pt recently admitted w/ epistaxis, elevated INR at 3.6 on 10/4. Repeat INR (10/6) prior to discharge at 2.2.   He has been d/c'd on 5 mg daily for now at discharge with close Coumadin clinic follow up.

## 2023-10-06 NOTE — DISCHARGE INSTRUCTIONS
The following are instructions your doctor would like you to follow regarding your activity, diet, and follow up care.    Your procedure was done by making a small puncture in your groin. You must observe that area for bleeding and swelling once you are discharged from the hospital. Be careful not to over-stimulate the affected wrist for 24 hours.    Do not strenuously exercise the leg for 24 hours.  Occlusive bandage (Tegaderm and gauze) may be removed after 24 hours.  At bandage removed, leave puncture site open to air. If minor oozing, apply adhesive bandage (Band-Aid) and remove after 12 hours.  No driving for 24 hours.  No soaking the groin site for 2 days. Gently wash site with soap and water. Dry well. Do not apply powders, lotions or ointments. No tub baths, whirlpool or swimming for 48 hours.  No lifting more than 10-15 pounds for 7 days.  Restart your usual diet and medications with appropriate changes as dictated by your doctor.  Resume your Coumadin at dose of 5 mg daily until follow-up with the Coumadin clinic on 10/9  For any bleeding, hold firm pressure with thumb against puncture site.  If you see fresh bleeding, bright red or a lump that is getting bigger at the puncture site, apply pressure to the area as instructed above and call [phone] between the hours of 8:00am-5:00pm or [phone] after hours and ask to speak to the Interventional Radiology Resident on-call. If you are unable to control the bleeding, call [phone].   Call the Interventional Radiology Resident on-call at [phone] with any concerns or any of the following:  Swelling, redness, discharge for the site, large bruising or increasing pain, numbness or tingling at the site  Temperature of 101 F or higher  Shortness of breath

## 2023-10-06 NOTE — DISCHARGE SUMMARY
[facility] - Neurosurgery ([facility])  Neurosurgery  Discharge Summary      Patient Name: Dariel Urbina  MRN: [id number]  Admission Date: 10/5/2023  Hospital Length of Stay: 1 days  Discharge Date and Time:  10/06/2023 12:30 PM  Attending Physician: Reshma Fu MD   Discharging Provider: Keira Castillo PA-C  Primary Care Provider: Devon Langston Jr., MD    HPI:   Dariel Urbina is a 83 y.o. male with PMH of recurrent epistaxis from the L nostril who presents for elective sphenopalatine artery embolization.     The patient has been examined and the recent H&P has been reviewed:     I concur with the findings and no changes have occurred since H&P below was written.         Per recent clinic visit with Dr. Fu on 9/18/23:    Patient is an 83-year-old man, past medical history of CKD, stage III, CHF, anemia of chronic kidney disease previous NSTEMI, with mechanical heart valve on anticoagulation.  Also has the history of type 2 diabetes with peripheral angiopathy, neuropathy and previous gangrene, with a history of hyperparathyroidism secondary to chronic renal disease.    He also has a more recent history of epistaxis.  Previously seen common referred to me by my ENT/skull base colleague, Dr. Jono Russell.  He previously saw Dr. Russell on 07/18/2023, then again on 08/15/2023.  He noted to continue to have epistaxis, mostly left-sided.  He previously underwent sphenopalatine artery ligation on 06/14/2023.  Initially bleeding was controlled, but now continues to have more frequent bleeding.  He continues to be on Coumadin.  Currently his sinus regimen consists of primarily nasal saline.       Patient notes that he continues to have epistaxis.  He has not had any in the past 3 days.  But knows normally he has epistaxis almost daily.  Almost exclusively under the right Ch.  He notes occasionally does have a right nose.  He says he feels now more that he feels it in the back of his throat as  opposed to through the nasal cavity.  He notes that occasionally when he coughs he has dark blood clots that come up.  He denies any difficulty breathing or difficulty with swallowing.  He is on Coumadin secondary to mechanical heart valve replacement.  He is here secondary to referral by Dr. Russell for possible embolization of the sphenopalatine artery.      * No procedures listed *     Hospital Course: Patient underwent 1-vessel cerebral angiogram with successful embolization of the L sphenopalatine artery on 10/5. No complications. Manual pressure was held to R groin site and leg was immobilized x 6 hours post-procedure. Pt was admitted for observation overnight. He remained neurologically intact and HDS. No signs of puncture site hematoma or bleeding, palpable distal pulses. INR 2.2, consulted with Pharmacy, instructed to continue Coumadin at dose of 5 mg daily and follow up in Coumadin clinic on 10/9. He was discharged home in good condition on 10/6.       Goals of Care Treatment Preferences:  Code Status: Full Code    Physical Exam:    General: well developed, well nourished, no distress.   Head: normocephalic, atraumatic  Neurologic: Alert and oriented. Thought content appropriate.  GCS: E4 V5 M6; Total: 15  Mental Status: Awake, Alert, Oriented x 4  Language: No aphasia  Speech: No dysarthria  Cranial nerves: face symmetric, tongue midline, CN II-XII grossly intact.   Eyes: pupils equal, round, reactive to light with accomodation, EOMI.   Nose/Oropharynx: Small amount of dried blood within L nare. No active bleeding.  Pulmonary: normal respirations, no signs of respiratory distress  Abdomen: soft, non-distended, not tender to palpation  Skin: Skin is warm, dry and intact.    Sensory: intact to light touch throughout  Motor Strength: Moves all extremities spontaneously with good tone.  Full strength upper and lower extremities. No abnormal movements seen.     R groin site: soft, no active bleeding, no  "swelling  Vascular: distal pulses 2+ bilaterally    Consults:     Significant Diagnostic Studies: Labs:   BMP: No results for input(s): "GLU", "NA", "K", "CL", "CO2", "BUN", "CREATININE", "CALCIUM", "MG" in the last 48 hours., CMP No results for input(s): "NA", "K", "CL", "CO2", "GLU", "BUN", "CREATININE", "CALCIUM", "PROT", "ALBUMIN", "BILITOT", "ALKPHOS", "AST", "ALT", "ANIONGAP", "ESTGFRAFRICA", "EGFRNONAA" in the last 48 hours., CBC   Recent Labs   Lab 10/06/23  0843   WBC 6.61   HGB 8.9*   HCT 28.4*      , INR   Lab Results   Component Value Date    INR 2.2 (H) 10/06/2023    INR 3.6 (H) 10/03/2023    INR 2.7 (H) 09/30/2023    PROTIME 28.4 (H) 09/30/2023    and All labs within the past 24 hours have been reviewed    Pending Diagnostic Studies:     Procedure Component Value Units Date/Time    IR Angiogram Carotid Cerebral Bilateral [[id number]] Resulted: 10/1941    Order Status: Sent Lab Status: In process Updated: 10/05/23 1945        Final Active Diagnoses:    Diagnosis Date Noted POA    PRINCIPAL PROBLEM:  Recurrent epistaxis [R04.0] 12/21/2022 Yes      Problems Resolved During this Admission:      Discharged Condition: good     Disposition: Home or Self Care    Follow Up:    Patient Instructions:      Notify your health care provider if you experience any of the following:  temperature >100.4     Notify your health care provider if you experience any of the following:  persistent nausea and vomiting or diarrhea     Notify your health care provider if you experience any of the following:  severe uncontrolled pain     Notify your health care provider if you experience any of the following:  redness, tenderness, or signs of infection (pain, swelling, redness, odor or green/yellow discharge around incision site)     Notify your health care provider if you experience any of the following:  difficulty breathing or increased cough     Notify your health care provider if you experience any of the " following:  severe persistent headache     Notify your health care provider if you experience any of the following:  worsening rash     Notify your health care provider if you experience any of the following:  persistent dizziness, light-headedness, or visual disturbances     Notify your health care provider if you experience any of the following:  increased confusion or weakness     Activity as tolerated     Medications:  Reconciled Home Medications:      Medication List      CONTINUE taking these medications    albuterol 90 mcg/actuation inhaler  Commonly known as: VENTOLIN HFA  Inhale 2 puffs into the lungs every 6 (six) hours as needed for Wheezing. Rescue     allopurinoL 100 MG tablet  Commonly known as: ZYLOPRIM  Take 1 tablet (100 mg total) by mouth once daily.     bumetanide 1 MG tablet  Commonly known as: BUMEX  Take 1 tablet (1 mg total) by mouth every other day.     enoxaparin 80 mg/0.8 mL Syrg  Commonly known as: LOVENOX  Inject 0.7 mLs (70 mg total) into the skin Daily.     ferrous gluconate 324 MG tablet  Commonly known as: FERGON  Take 1 tablet (324 mg total) by mouth daily with breakfast.     fish oil-omega-3 fatty acids 300-1,000 mg capsule  Take 1 capsule by mouth once daily.     isosorbide mononitrate 60 MG 24 hr tablet  Commonly known as: IMDUR  Take 1 tablet (60 mg total) by mouth every evening.     loperamide 2 mg capsule  Commonly known as: IMODIUM  Take 2 mg by mouth 4 (four) times daily as needed for Diarrhea.     metoprolol succinate 25 MG 24 hr tablet  Commonly known as: TOPROL-XL  Take 1 tablet (25 mg total) by mouth once daily.     NASAL DECONGESTANT (OXYMETAZL) 0.05 % nasal spray  Generic drug: oxymetazoline  Use 2 sprays by Nasal route daily as needed (nose bleed).     rosuvastatin 40 MG Tab  Commonly known as: CRESTOR  TAKE 1 TABLET EVERY EVENING.     warfarin 5 MG tablet  Commonly known as: COUMADIN  Take warfarin 7.5mg (1.5 tablets) PO Tuesday & Saturday, then take 5mg PO all other  days or as instructed by Coumadin Clinic            Keira Castillo PA-C  Neurosurgery  [facility] - Neurosurgery [facility])

## 2023-10-06 NOTE — PLAN OF CARE
[facility] - Neurosurgery (Hospital)  Discharge Assessment    Primary Care Provider: Devon Langston Jr., MD     CM met with patient and spouse to obtain discharge planning assessment.      [facility] - [place], [place] - [address]  [address] [address]  Phone: [phone] Fax: [phone]    [facility]  [address]  [facility] [address]  Phone: [phone] Fax: [phone]       Discharge Assessment (most recent)       BRIEF DISCHARGE ASSESSMENT - 10/06/23 1242          Discharge Planning    Assessment Type Discharge Planning Brief Assessment     Resource/Environmental Concerns none     Support Systems Spouse/significant other     Equipment Currently Used at Home none     Current Living Arrangements home     Patient/Family Anticipates Transition to home with family     Patient/Family Anticipated Services at Transition none     DME Needed Upon Discharge  none     Discharge Plan A Home with family     Discharge Plan B Home with family        Physical Activity    On average, how many days per week do you engage in moderate to strenuous exercise (like a brisk walk)? 0 days     On average, how many minutes do you engage in exercise at this level? 0 min        Financial Resource Strain    How hard is it for you to pay for the very basics like food, housing, medical care, and heating? Not hard at all        Housing Stability    In the last 12 months, was there a time when you were not able to pay the mortgage or rent on time? No     In the last 12 months, was there a time when you did not have a steady place to sleep or slept in a shelter (including now)? No        Transportation Needs    In the past 12 months, has lack of transportation kept you from medical appointments or from getting medications? No     In the past 12 months, has lack of transportation kept you from meetings, work, or from getting things needed for daily living? No        Food  Insecurity    Within the past 12 months, you worried that your food would run out before you got the money to buy more. Never true     Within the past 12 months, the food you bought just didn't last and you didn't have money to get more. Never true        Stress    Do you feel stress - tense, restless, nervous, or anxious, or unable to sleep at night because your mind is troubled all the time - these days? Not at all        Social Connections    In a typical week, how many times do you talk on the phone with family, friends, or neighbors? More than three times a week     How often do you get together with friends or relatives? More than three times a week     How often do you attend Jew or Pentecostal services? Never     Do you belong to any clubs or organizations such as Jew groups, unions, fraternal or athletic groups, or school groups? No     How often do you attend meetings of the clubs or organizations you belong to? Never     Are you , , , , never , or living with a partner?         Alcohol Use    Q1: How often do you have a drink containing alcohol? Never     Q2: How many drinks containing alcohol do you have on a typical day when you are drinking? Patient does not drink     Q3: How often do you have six or more drinks on one occasion? Never

## 2023-10-06 NOTE — ANESTHESIA POSTPROCEDURE EVALUATION
Anesthesia Post Evaluation    Patient: Dariel Urbina    Procedure(s) Performed: * No procedures listed *    Final Anesthesia Type: general      Patient location during evaluation: PACU  Patient participation: Yes- Able to Participate  Level of consciousness: awake  Post-procedure vital signs: reviewed and stable  Pain management: adequate  Airway patency: patent    PONV status at discharge: No PONV  Anesthetic complications: no      Cardiovascular status: blood pressure returned to baseline  Respiratory status: unassisted  Hydration status: euvolemic  Follow-up not needed.          Vitals Value Taken Time   /65 10/05/23 2200   Temp 36.7 °C (98.1 °F) 10/05/23 2200   Pulse 70 10/05/23 2200   Resp 22 10/05/23 2200   SpO2 95 % 10/05/23 2201   Vitals shown include unvalidated device data.      No case tracking events are documented in the log.      Pain/Sanjeev Score: Sanjeev Score: 8 (10/5/2023  8:25 PM)

## 2023-10-08 ENCOUNTER — HOSPITAL ENCOUNTER (OUTPATIENT)
Facility: HOSPITAL | Age: 83
Discharge: HOME | End: 2023-10-09
Attending: EMERGENCY MEDICINE | Admitting: STUDENT IN AN ORGANIZED HEALTH CARE EDUCATION/TRAINING PROGRAM
Payer: MEDICARE

## 2023-10-08 DIAGNOSIS — Z79.01 ANTICOAGULATED ON COUMADIN: ICD-10-CM

## 2023-10-08 DIAGNOSIS — R04.0 EPISTAXIS, RECURRENT: Primary | ICD-10-CM

## 2023-10-08 DIAGNOSIS — R07.9 CHEST PAIN: ICD-10-CM

## 2023-10-08 LAB
ABO + RH BLD: NORMAL
ANION GAP SERPL CALC-SCNC: 8 MMOL/L (ref 8–16)
BASOPHILS # BLD AUTO: 0.03 K/UL (ref 0–0.2)
BASOPHILS NFR BLD: 0.4 % (ref 0–1.9)
BLD GP AB SCN CELLS X3 SERPL QL: NORMAL
BLD PROD TYP BPU: NORMAL
BLOOD UNIT EXPIRATION DATE: NORMAL
BLOOD UNIT TYPE CODE: 9500
BLOOD UNIT TYPE: NORMAL
BUN SERPL-MCNC: 64 MG/DL (ref 8–23)
CALCIUM SERPL-MCNC: 9.7 MG/DL (ref 8.7–10.5)
CHLORIDE SERPL-SCNC: 113 MMOL/L (ref 95–110)
CO2 SERPL-SCNC: 20 MMOL/L (ref 23–29)
CODING SYSTEM: NORMAL
CREAT SERPL-MCNC: 2.3 MG/DL (ref 0.5–1.4)
CROSSMATCH INTERPRETATION: NORMAL
DIFFERENTIAL METHOD: ABNORMAL
DISPENSE STATUS: NORMAL
EOSINOPHIL # BLD AUTO: 0.1 K/UL (ref 0–0.5)
EOSINOPHIL NFR BLD: 1.6 % (ref 0–8)
ERYTHROCYTE [DISTWIDTH] IN BLOOD BY AUTOMATED COUNT: 15.5 % (ref 11.5–14.5)
EST. GFR  (NO RACE VARIABLE): 27.7 ML/MIN/1.73 M^2
GLUCOSE SERPL-MCNC: 124 MG/DL (ref 70–110)
HCT VFR BLD AUTO: 24 % (ref 40–54)
HCT VFR BLD AUTO: 26.7 % (ref 40–54)
HCV AB SERPL QL IA: NORMAL
HGB BLD-MCNC: 7.6 G/DL (ref 14–18)
HGB BLD-MCNC: 8.5 G/DL (ref 14–18)
HIV 1+2 AB+HIV1 P24 AG SERPL QL IA: NORMAL
IMM GRANULOCYTES # BLD AUTO: 0.03 K/UL (ref 0–0.04)
IMM GRANULOCYTES NFR BLD AUTO: 0.4 % (ref 0–0.5)
INR PPP: 2.8 (ref 0.8–1.2)
LYMPHOCYTES # BLD AUTO: 1 K/UL (ref 1–4.8)
LYMPHOCYTES NFR BLD: 12.7 % (ref 18–48)
MCH RBC QN AUTO: 30.2 PG (ref 27–31)
MCHC RBC AUTO-ENTMCNC: 31.7 G/DL (ref 32–36)
MCV RBC AUTO: 95 FL (ref 82–98)
MONOCYTES # BLD AUTO: 0.7 K/UL (ref 0.3–1)
MONOCYTES NFR BLD: 8.5 % (ref 4–15)
NEUTROPHILS # BLD AUTO: 6.1 K/UL (ref 1.8–7.7)
NEUTROPHILS NFR BLD: 76.4 % (ref 38–73)
NRBC BLD-RTO: 0 /100 WBC
PLATELET # BLD AUTO: 162 K/UL (ref 150–450)
PMV BLD AUTO: 10.5 FL (ref 9.2–12.9)
POTASSIUM SERPL-SCNC: 4.3 MMOL/L (ref 3.5–5.1)
PROTHROMBIN TIME: 28.5 SEC (ref 9–12.5)
RBC # BLD AUTO: 2.52 M/UL (ref 4.6–6.2)
SODIUM SERPL-SCNC: 141 MMOL/L (ref 136–145)
SPECIMEN OUTDATE: NORMAL
TRANS ERYTHROCYTES VOL PATIENT: NORMAL ML
WBC # BLD AUTO: 7.98 K/UL (ref 3.9–12.7)

## 2023-10-08 PROCEDURE — 99214 PR OFFICE/OUTPT VISIT, EST, LEVL IV, 30-39 MIN: ICD-10-PCS | Mod: HCNC,,, | Performed by: OTOLARYNGOLOGY

## 2023-10-08 PROCEDURE — 85014 HEMATOCRIT: CPT | Mod: HCNC,91 | Performed by: STUDENT IN AN ORGANIZED HEALTH CARE EDUCATION/TRAINING PROGRAM

## 2023-10-08 PROCEDURE — 99222 PR INITIAL HOSPITAL CARE,LEVL II: ICD-10-PCS | Mod: AI,,, | Performed by: STUDENT IN AN ORGANIZED HEALTH CARE EDUCATION/TRAINING PROGRAM

## 2023-10-08 PROCEDURE — 80048 BASIC METABOLIC PNL TOTAL CA: CPT | Mod: HCNC | Performed by: EMERGENCY MEDICINE

## 2023-10-08 PROCEDURE — 86900 BLOOD TYPING SEROLOGIC ABO: CPT | Mod: HCNC | Performed by: STUDENT IN AN ORGANIZED HEALTH CARE EDUCATION/TRAINING PROGRAM

## 2023-10-08 PROCEDURE — 99214 OFFICE O/P EST MOD 30 MIN: CPT | Mod: HCNC,,, | Performed by: OTOLARYNGOLOGY

## 2023-10-08 PROCEDURE — P9021 RED BLOOD CELLS UNIT: HCPCS | Mod: HCNC | Performed by: STUDENT IN AN ORGANIZED HEALTH CARE EDUCATION/TRAINING PROGRAM

## 2023-10-08 PROCEDURE — 85018 HEMOGLOBIN: CPT | Mod: HCNC | Performed by: STUDENT IN AN ORGANIZED HEALTH CARE EDUCATION/TRAINING PROGRAM

## 2023-10-08 PROCEDURE — 86920 COMPATIBILITY TEST SPIN: CPT | Mod: HCNC | Performed by: STUDENT IN AN ORGANIZED HEALTH CARE EDUCATION/TRAINING PROGRAM

## 2023-10-08 PROCEDURE — 25000003 PHARM REV CODE 250: Mod: HCNC | Performed by: STUDENT IN AN ORGANIZED HEALTH CARE EDUCATION/TRAINING PROGRAM

## 2023-10-08 PROCEDURE — G0378 HOSPITAL OBSERVATION PER HR: HCPCS | Mod: HCNC

## 2023-10-08 PROCEDURE — 25000003 PHARM REV CODE 250: Mod: HCNC | Performed by: EMERGENCY MEDICINE

## 2023-10-08 PROCEDURE — 86803 HEPATITIS C AB TEST: CPT | Mod: HCNC | Performed by: PHYSICIAN ASSISTANT

## 2023-10-08 PROCEDURE — 30901 CONTROL OF NOSEBLEED: CPT | Mod: HCNC,LT

## 2023-10-08 PROCEDURE — 85025 COMPLETE CBC W/AUTO DIFF WBC: CPT | Mod: HCNC | Performed by: EMERGENCY MEDICINE

## 2023-10-08 PROCEDURE — 87389 HIV-1 AG W/HIV-1&-2 AB AG IA: CPT | Mod: HCNC | Performed by: PHYSICIAN ASSISTANT

## 2023-10-08 PROCEDURE — 36430 TRANSFUSION BLD/BLD COMPNT: CPT | Mod: HCNC

## 2023-10-08 PROCEDURE — 99285 EMERGENCY DEPT VISIT HI MDM: CPT | Mod: 25,HCNC

## 2023-10-08 PROCEDURE — 99222 1ST HOSP IP/OBS MODERATE 55: CPT | Mod: AI,,, | Performed by: STUDENT IN AN ORGANIZED HEALTH CARE EDUCATION/TRAINING PROGRAM

## 2023-10-08 PROCEDURE — 85610 PROTHROMBIN TIME: CPT | Mod: HCNC | Performed by: EMERGENCY MEDICINE

## 2023-10-08 RX ORDER — WARFARIN 2.5 MG/1
7.5 TABLET ORAL
Status: DISCONTINUED | OUTPATIENT
Start: 2023-10-10 | End: 2023-10-09 | Stop reason: HOSPADM

## 2023-10-08 RX ORDER — ONDANSETRON 2 MG/ML
4 INJECTION INTRAMUSCULAR; INTRAVENOUS EVERY 8 HOURS PRN
Status: DISCONTINUED | OUTPATIENT
Start: 2023-10-08 | End: 2023-10-09 | Stop reason: HOSPADM

## 2023-10-08 RX ORDER — HYDROCODONE BITARTRATE AND ACETAMINOPHEN 500; 5 MG/1; MG/1
TABLET ORAL
Status: DISCONTINUED | OUTPATIENT
Start: 2023-10-08 | End: 2023-10-09 | Stop reason: HOSPADM

## 2023-10-08 RX ORDER — AMOXICILLIN 500 MG/1
1000 CAPSULE ORAL
Status: COMPLETED | OUTPATIENT
Start: 2023-10-08 | End: 2023-10-08

## 2023-10-08 RX ORDER — WARFARIN SODIUM 5 MG/1
5 TABLET ORAL
Status: DISCONTINUED | OUTPATIENT
Start: 2023-10-08 | End: 2023-10-08

## 2023-10-08 RX ORDER — IBUPROFEN 200 MG
24 TABLET ORAL
Status: DISCONTINUED | OUTPATIENT
Start: 2023-10-08 | End: 2023-10-09 | Stop reason: HOSPADM

## 2023-10-08 RX ORDER — DIPHENHYDRAMINE HCL 25 MG
50 CAPSULE ORAL NIGHTLY PRN
COMMUNITY
End: 2023-12-15

## 2023-10-08 RX ORDER — SODIUM CHLORIDE 0.9 % (FLUSH) 0.9 %
10 SYRINGE (ML) INJECTION EVERY 12 HOURS PRN
Status: DISCONTINUED | OUTPATIENT
Start: 2023-10-08 | End: 2023-10-09 | Stop reason: HOSPADM

## 2023-10-08 RX ORDER — FERROUS GLUCONATE 324(37.5)
324 TABLET ORAL
Status: DISCONTINUED | OUTPATIENT
Start: 2023-10-09 | End: 2023-10-09 | Stop reason: HOSPADM

## 2023-10-08 RX ORDER — NALOXONE HCL 0.4 MG/ML
0.02 VIAL (ML) INJECTION
Status: DISCONTINUED | OUTPATIENT
Start: 2023-10-08 | End: 2023-10-09 | Stop reason: HOSPADM

## 2023-10-08 RX ORDER — POLYETHYLENE GLYCOL 3350 17 G/17G
17 POWDER, FOR SOLUTION ORAL DAILY
Status: DISCONTINUED | OUTPATIENT
Start: 2023-10-08 | End: 2023-10-09 | Stop reason: HOSPADM

## 2023-10-08 RX ORDER — OXYMETAZOLINE HCL 0.05 %
1 SPRAY, NON-AEROSOL (ML) NASAL
Status: COMPLETED | OUTPATIENT
Start: 2023-10-08 | End: 2023-10-08

## 2023-10-08 RX ORDER — ACETAMINOPHEN 325 MG/1
650 TABLET ORAL EVERY 4 HOURS PRN
Status: DISCONTINUED | OUTPATIENT
Start: 2023-10-08 | End: 2023-10-09

## 2023-10-08 RX ORDER — ISOSORBIDE MONONITRATE 60 MG/1
60 TABLET, EXTENDED RELEASE ORAL NIGHTLY
Status: DISCONTINUED | OUTPATIENT
Start: 2023-10-08 | End: 2023-10-09 | Stop reason: HOSPADM

## 2023-10-08 RX ORDER — ALLOPURINOL 100 MG/1
100 TABLET ORAL DAILY
Status: DISCONTINUED | OUTPATIENT
Start: 2023-10-08 | End: 2023-10-09 | Stop reason: HOSPADM

## 2023-10-08 RX ORDER — WARFARIN 2.5 MG/1
5 TABLET ORAL
Status: DISCONTINUED | OUTPATIENT
Start: 2023-10-08 | End: 2023-10-09 | Stop reason: HOSPADM

## 2023-10-08 RX ORDER — IBUPROFEN 200 MG
16 TABLET ORAL
Status: DISCONTINUED | OUTPATIENT
Start: 2023-10-08 | End: 2023-10-09 | Stop reason: HOSPADM

## 2023-10-08 RX ORDER — LOPERAMIDE HYDROCHLORIDE 2 MG/1
2 CAPSULE ORAL 4 TIMES DAILY PRN
Status: DISCONTINUED | OUTPATIENT
Start: 2023-10-08 | End: 2023-10-09 | Stop reason: HOSPADM

## 2023-10-08 RX ORDER — ALBUTEROL SULFATE 90 UG/1
2 AEROSOL, METERED RESPIRATORY (INHALATION) EVERY 6 HOURS PRN
Status: DISCONTINUED | OUTPATIENT
Start: 2023-10-08 | End: 2023-10-09 | Stop reason: HOSPADM

## 2023-10-08 RX ORDER — METOPROLOL SUCCINATE 25 MG/1
25 TABLET, EXTENDED RELEASE ORAL
Status: COMPLETED | OUTPATIENT
Start: 2023-10-08 | End: 2023-10-08

## 2023-10-08 RX ORDER — ATORVASTATIN CALCIUM 40 MG/1
40 TABLET, FILM COATED ORAL DAILY
Status: DISCONTINUED | OUTPATIENT
Start: 2023-10-08 | End: 2023-10-09 | Stop reason: HOSPADM

## 2023-10-08 RX ORDER — WARFARIN 2.5 MG/1
5 TABLET ORAL
Status: DISCONTINUED | OUTPATIENT
Start: 2023-10-09 | End: 2023-10-09 | Stop reason: HOSPADM

## 2023-10-08 RX ORDER — OXYMETAZOLINE HCL 0.05 %
1 SPRAY, NON-AEROSOL (ML) NASAL
Status: DISCONTINUED | OUTPATIENT
Start: 2023-10-08 | End: 2023-10-08

## 2023-10-08 RX ORDER — BUMETANIDE 1 MG/1
1 TABLET ORAL EVERY OTHER DAY
Status: DISCONTINUED | OUTPATIENT
Start: 2023-10-09 | End: 2023-10-09 | Stop reason: HOSPADM

## 2023-10-08 RX ORDER — GLUCAGON 1 MG
1 KIT INJECTION
Status: DISCONTINUED | OUTPATIENT
Start: 2023-10-08 | End: 2023-10-09 | Stop reason: HOSPADM

## 2023-10-08 RX ORDER — METOPROLOL SUCCINATE 25 MG/1
25 TABLET, EXTENDED RELEASE ORAL DAILY
Status: DISCONTINUED | OUTPATIENT
Start: 2023-10-08 | End: 2023-10-08

## 2023-10-08 RX ORDER — ACETAMINOPHEN 500 MG
500 TABLET ORAL DAILY PRN
COMMUNITY

## 2023-10-08 RX ADMIN — ATORVASTATIN CALCIUM 40 MG: 40 TABLET, FILM COATED ORAL at 10:10

## 2023-10-08 RX ADMIN — OXYMETAZOLINE HYDROCHLORIDE 1 SPRAY: 0.05 SPRAY NASAL at 09:10

## 2023-10-08 RX ADMIN — ALLOPURINOL 100 MG: 100 TABLET ORAL at 03:10

## 2023-10-08 RX ADMIN — AMOXICILLIN 1000 MG: 500 CAPSULE ORAL at 11:10

## 2023-10-08 RX ADMIN — WARFARIN SODIUM 5 MG: 2.5 TABLET ORAL at 10:10

## 2023-10-08 RX ADMIN — METOPROLOL SUCCINATE 25 MG: 25 TABLET, EXTENDED RELEASE ORAL at 11:10

## 2023-10-08 RX ADMIN — ISOSORBIDE MONONITRATE 60 MG: 60 TABLET, EXTENDED RELEASE ORAL at 10:10

## 2023-10-08 NOTE — SUBJECTIVE & OBJECTIVE
Medications:  Continuous Infusions:  Scheduled Meds:   allopurinoL  100 mg Oral Daily    atorvastatin  40 mg Oral Daily    [START ON 10/9/2023] bumetanide  1 mg Oral Every other day    [START ON 10/9/2023] ferrous gluconate  324 mg Oral Daily with breakfast    isosorbide mononitrate  60 mg Oral QHS    [START ON 10/9/2023] metoprolol succinate  25 mg Oral Daily    polyethylene glycol  17 g Oral Daily    warfarin  5 mg Oral Once per day on Sun Wed Thu Fri    [START ON 10/9/2023] warfarin  5 mg Oral Every Mon    [START ON 10/10/2023] warfarin  7.5 mg Oral Once per day on Tue Sat     PRN Meds:0.9%  NaCl infusion (for blood administration), acetaminophen, albuterol, dextrose 10%, dextrose 10%, glucagon (human recombinant), glucose, glucose, loperamide, naloxone, ondansetron, sodium chloride 0.9%     Current Facility-Administered Medications on File Prior to Encounter   Medication    haloperidol lactate injection 0.5 mg    HYDROmorphone injection 0.2 mg    sodium chloride 0.9% flush 10 mL     Current Outpatient Medications on File Prior to Encounter   Medication Sig    acetaminophen (TYLENOL) 500 MG tablet Take 500 mg by mouth daily as needed for Pain.    allopurinoL (ZYLOPRIM) 100 MG tablet Take 1 tablet (100 mg total) by mouth once daily.    bumetanide (BUMEX) 1 MG tablet Take 1 tablet (1 mg total) by mouth every other day.    diphenhydrAMINE (BENADRYL) 25 mg capsule Take 50 mg by mouth nightly as needed for Insomnia.    ferrous gluconate (FERGON) 324 MG tablet Take 1 tablet (324 mg total) by mouth daily with breakfast.    loperamide (IMODIUM) 2 mg capsule Take 2 mg by mouth 4 (four) times daily as needed for Diarrhea.    metoprolol succinate (TOPROL-XL) 25 MG 24 hr tablet Take 1 tablet (25 mg total) by mouth once daily.    omega-3 fatty acids/fish oil (FISH OIL-OMEGA-3 FATTY ACIDS) 300-1,000 mg capsule Take 1 capsule by mouth once daily.    oxymetazoline (AFRIN) 0.05 % nasal spray Use 2 sprays by Nasal route daily as  needed (nose bleed). (Patient taking differently: 2 sprays by Nasal route every 2 (two) hours as needed (nose bleed).)    rosuvastatin (CRESTOR) 40 MG Tab TAKE 1 TABLET EVERY EVENING.    warfarin (COUMADIN) 5 MG tablet Take warfarin 7.5mg (1.5 tablets) PO Tuesday & Saturday, then take 5mg PO all other days or as instructed by Coumadin Clinic    isosorbide mononitrate (IMDUR) 60 MG 24 hr tablet Take 1 tablet (60 mg total) by mouth every evening.    [DISCONTINUED] albuterol (VENTOLIN HFA) 90 mcg/actuation inhaler Inhale 2 puffs into the lungs every 6 (six) hours as needed for Wheezing. Rescue    [DISCONTINUED] enoxaparin (LOVENOX) 80 mg/0.8 mL Syrg Inject 0.7 mLs (70 mg total) into the skin Daily.     Review of patient's allergies indicates:   Allergen Reactions    Lisinopril     Losartan      Intolerance- elevates potassium level     Past Medical History:   Diagnosis Date    Anemia of chronic renal failure, stage 3 (moderate) 05/27/2015    Anticoagulant long-term use     Atherosclerosis of coronary artery bypass graft of native heart without angina pectoris 09/11/2012    3-27-18 [facility] Two vessel coronary artery disease.   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    Bilateral carotid artery disease 02/09/2017    Bleeding from the nose     Bleeding nose 03/21/2018    Cancer     Cataract     CHF (congestive heart failure)     CKD (chronic kidney disease) stage 3, GFR 30-59 ml/min 05/27/2015    Claudication of left lower extremity 09/17/2014    Colon polyp     Encounter for blood transfusion     Gastroesophageal reflux disease without esophagitis 03/19/2018    Gastrointestinal hemorrhage associated with intestinal diverticulosis 04/01/2018    Glaucoma     H/O mechanical aortic valve replacement 09/17/2014    History of gout 09/26/2012    Hyperparathyroidism due to renal insufficiency 07/27/2015    Internal hemorrhoid 04/03/2018    Long term current use of anticoagulant therapy 09/26/2012     Mechanical heart valve present     Metabolic acidosis with normal anion gap and bicarbonate losses 03/20/2018    Mixed hyperlipidemia 09/26/2012    NSTEMI (non-ST elevated myocardial infarction) 03/21/2018    Obesity, diabetes, and hypertension syndrome 02/23/2016    Paroxysmal atrial fibrillation 02/06/2023    PVD (peripheral vascular disease) 09/11/2012    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     Past Surgical History:   Procedure Laterality Date    BACK SURGERY      CARDIAC CATHETERIZATION      CARDIAC VALVE REPLACEMENT      CARDIAC VALVE SURGERY      CARPAL TUNNEL RELEASE Right 05/19/2020    Procedure: RELEASE, CARPAL TUNNEL;  Surgeon: Rupesh Norris Jr., MD;  Location: [facility];  Service: Plastics;  Laterality: Right;    CATARACT EXTRACTION Left 11/13/2022        COLON SURGERY      COLONOSCOPY N/A 03/31/2017    Procedure: COLONOSCOPY;  Surgeon: Bruno Raymond MD;  Location: [facility] (4TH FLR);  Service: Endoscopy;  Laterality: N/A;  Patient's wife requesting date.    COLONOSCOPY N/A 04/03/2018    Procedure: COLONOSCOPY;  Surgeon: Bonifacio Pelletier MD;  Location: [facility] (2ND FLR);  Service: Endoscopy;  Laterality: N/A;    COLONOSCOPY N/A 08/13/2018    Procedure: COLONOSCOPY;  Surgeon: Kam Barba MD;  Location: [facility] (2ND FLR);  Service: Endoscopy;  Laterality: N/A;  2nd floor: PA pressure 49; hx of moderate-severe valve disease     per Coumadin clinic-Patient can hold 5 days with lovenox bridge       ok to schedule per Katarina    CORONARY ANGIOGRAPHY N/A 10/04/2021    Procedure: Left heart cath +/- peripheral angiogram;  Surgeon: Jose Ruiz MD;  Location: [facility] CATH LAB;  Service:  Cardiology;  Laterality: N/A;    CORONARY ANGIOGRAPHY N/A 3/2/2023    Procedure: Angiogram, Coronary;  Surgeon: Devon Garnica MD;  Location: [facility] CATH LAB;  Service: Cardiology;  Laterality: N/A;    CORONARY ANGIOPLASTY      CORONARY ARTERY BYPASS GRAFT      CORONARY BYPASS GRAFT ANGIOGRAPHY  10/04/2021    Procedure: Bypass graft study;  Surgeon: Jose Ruiz MD;  Location: [facility] CATH LAB;  Service: Cardiology;;    CORONARY BYPASS GRAFT ANGIOGRAPHY  3/2/2023    Procedure: Bypass graft study;  Surgeon: Devon Garnica MD;  Location: [facility] CATH LAB;  Service: Cardiology;;    ECHOCARDIOGRAM,TRANSESOPHAGEAL N/A 4/14/2023    Procedure: Transesophageal echo (KIRSTEN) intra-procedure log documentation;  Surgeon: [facility] Diagnostic Provider;  Location: [facility] EP LAB;  Service: Cardiology;  Laterality: N/A;    EYE SURGERY      FUNCTIONAL ENDOSCOPIC SINUS SURGERY (FESS) Bilateral 6/14/2023    Procedure: FESS (FUNCTIONAL ENDOSCOPIC SINUS SURGERY);  Surgeon: Jono Russell MD;  Location: [address];  Service: ENT;  Laterality: Bilateral;    HERNIA REPAIR      INTRAOCULAR PROSTHESES INSERTION Left 11/13/2022    Procedure: INSERTION, IOL PROSTHESIS;  Surgeon: Alia Mckeon MD;  Location: [facility] OR 1ST FLR;  Service: Ophthalmology;  Laterality: Left;    INTRAOCULAR PROSTHESES INSERTION Right 12/04/2022    Procedure: INSERTION, IOL PROSTHESIS;  Surgeon: Alia Mckeon MD;  [facility];  Service: Ophthalmology;  Laterality: Right;    LIGATION, ARTERY, SPHENOPALATINE, ENDOSCOPIC Bilateral 6/14/2023    Procedure: LIGATION, ARTERY, SPHENOPALATINE, ENDOSCOPIC;  Surgeon: Jono Russell MD;  Location: [facility] OR 2ND FLR;  Service: ENT;  Laterality: Bilateral;    PHACOEMULSIFICATION OF CATARACT Left 11/13/2022    Procedure: PHACOEMULSIFICATION, CATARACT;  Surgeon: Alia Mckeon MD;  Location: [facility] OR 1ST FLR;  Service: Ophthalmology;  Laterality: Left;    PHACOEMULSIFICATION OF CATARACT Right 12/04/2022     Procedure: PHACOEMULSIFICATION, CATARACT;  Surgeon: Alia Mckeon MD;  Location: [address];  Service: Ophthalmology;  Laterality: Right;    SPINE SURGERY      TRANSESOPHAGEAL ECHOCARDIOGRAPHY N/A 3/22/2023    Procedure: ECHOCARDIOGRAM, TRANSESOPHAGEAL;  Surgeon: [facility] Diagnostic Provider;  Location: [facility] EP LAB;  Service: Anesthesiology;  Laterality: N/A;    TRANSESOPHAGEAL ECHOCARDIOGRAPHY N/A 2023    Procedure: ECHOCARDIOGRAM, TRANSESOPHAGEAL;  Surgeon: [facility] Diagnostic Provider;  Location: [facility] EP LAB;  Service: Anesthesiology;  Laterality: N/A;    VASECTOMY       Family History       Problem Relation (Age of Onset)    Alcohol abuse Father    Diabetes Brother    Heart disease Mother, Father, Brother, Sister    Heart failure Mother, Father, Brother    No Known Problems Sister, Maternal Grandmother, Maternal Grandfather, Paternal Grandmother, Paternal Grandfather, Maternal Aunt, Maternal Uncle, Paternal Aunt, Paternal Uncle          Tobacco Use    Smoking status: Former     Current packs/day: 0.00     Average packs/day: 1 pack/day for 20.0 years (20.0 ttl pk-yrs)     Types: Cigarettes     Start date: 1960     Quit date: 1980     Years since quittin.1     Passive exposure: Never    Smokeless tobacco: Never   Substance and Sexual Activity    Alcohol use: No    Drug use: No    Sexual activity: Not Currently     Partners: Female     Review of Systems  Objective:     Vital Signs (Most Recent):  Temp: 97.6 °F (36.4 °C) (10/08/23 0921)  Pulse: (!) 113 (10/08/23 120)  Resp: 18 (10/08/23 0921)  BP: (!) 157/68 (10/08/23 1201)  SpO2: 100 % (10/08/23 120) Vital Signs (24h Range):  Temp:  [97.6 °F (36.4 °C)] 97.6 °F (36.4 °C)  Pulse:  [] 113  Resp:  [18] 18  SpO2:  [95 %-100 %] 100 %  BP: (149-167)/(67-68) 157/68     Weight: 78.9 kg (174 lb)  Body mass index is 28.96 kg/m².     Physical Exam  Resting comfortably on ED stretcher, in no acute distress  Right naris with no active bleeding  Left  naris packed with RapidRhino, hemostatic  Oropharynx with no blood    Significant Labs:  BMP:   Recent Labs   Lab 10/08/23  1003   *   *   CO2 20*   BUN 64*   CREATININE 2.3*   CALCIUM 9.7     CBC:   Recent Labs   Lab 10/08/23  1003   WBC 7.98   RBC 2.52*   HGB 7.6*   HCT 24.0*      MCV 95   MCH 30.2   MCHC 31.7*     Coagulation:   Recent Labs   Lab 10/08/23  1003   LABPROT 28.5*   INR 2.8*     Significant Diagnostics:  None

## 2023-10-08 NOTE — PHARMACY MED REC
"Admission Medication History     The home medication history was taken by Winifred Kemp.    You may go to "Admission" then "Reconcile Home Medications" tabs to review and/or act upon these items.     The home medication list has been updated by the Pharmacy department.   Please read ALL comments highlighted in yellow.   Please address this information as you see fit.    Feel free to contact us if you have any questions or require assistance.      The medications listed below were removed from the home medication list. Please reorder if appropriate:  Patient reports no longer taking the following medication(s):  ALBUTEROL 90 MCG ACTUATION INHALER  ENOXAPARIN 80 MG/ 0.8ML    Medications listed below were obtained from: Patient/family  Current Outpatient Medications on File Prior to Encounter   Medication Sig    acetaminophen (TYLENOL) 500 MG tablet Take 500 mg by mouth daily as needed for Pain.    allopurinoL (ZYLOPRIM) 100 MG tablet Take 1 tablet (100 mg total) by mouth once daily.    bumetanide (BUMEX) 1 MG tablet Take 1 tablet (1 mg total) by mouth every other day.    diphenhydrAMINE (BENADRYL) 25 mg capsule Take 50 mg by mouth nightly as needed for Insomnia.    ferrous gluconate (FERGON) 324 MG tablet Take 1 tablet (324 mg total) by mouth daily with breakfast.    loperamide (IMODIUM) 2 mg capsule Take 2 mg by mouth 4 (four) times daily as needed for Diarrhea.    metoprolol succinate (TOPROL-XL) 25 MG 24 hr tablet Take 1 tablet (25 mg total) by mouth once daily.    omega-3 fatty acids/fish oil (FISH OIL-OMEGA-3 FATTY ACIDS) 300-1,000 mg capsule Take 1 capsule by mouth once daily.    oxymetazoline (AFRIN) 0.05 % nasal spray 2 sprays by Nasal route every 2 (two) hours as needed (nose bleed). Pt goes through 2-3 small bottles per week    rosuvastatin (CRESTOR) 40 MG Tab TAKE 1 TABLET EVERY EVENING.    warfarin (COUMADIN) 5 MG tablet Take warfarin 7.5mg (1.5 tablets) PO Tuesday & Saturday, then take 5mg PO all other " days or as instructed by [facility]    isosorbide mononitrate (IMDUR) 60 MG 24 hr tablet Take 1 tablet (60 mg total) by mouth every evening.               Winifred Kemp  EXT [id number]                  .

## 2023-10-08 NOTE — ASSESSMENT & PLAN NOTE
-- rhino pack, left nostril, no bleeding currently   -- trending H&H q.8 hours.  -- transfusion 1 unit of blood, history of CAD s/p CABG 1990s

## 2023-10-08 NOTE — CONSULTS
[facility] Dept  Otorhinolaryngology-Head & Neck Surgery  Consult Note    Patient Name: Dariel Urbina  MRN: [id number]  Code Status: Full Code  Admission Date: 10/8/2023  Hospital Length of Stay: 0 days  Attending Physician: Mike Costello DO  Primary Care Provider: Devon Langston Jr., MD    Patient information was obtained from patient and past medical records.     Consults  Subjective:     Chief Complaint/Reason for Admission: recurrent epistaxis    History of Present Illness: Mr. Urbina is an 83 year-old male with a history of pAF, mechanical aortic valve (on warfarin), and recurrent epistaxis s/p bilateral endoscopic sphenopalatine artery ligation on 6/14/23 and more recently left sphenopalatine artery embolization on 10/5/23. He presents to the ED today due to persistent left epistaxis refractory to oxymetazoline nasal spray. A Rapid Rhino was placed in the left naris by the [facility] ED team. Patient requesting to stay inpatient for observation. ENT consulted for further evaluation.    Upon my arrival, patient enjoying lunch. Left naris hemostatic. No complaints.      Medications:  Continuous Infusions:  Scheduled Meds:   allopurinoL  100 mg Oral Daily    atorvastatin  40 mg Oral Daily    [START ON 10/9/2023] bumetanide  1 mg Oral Every other day    [START ON 10/9/2023] ferrous gluconate  324 mg Oral Daily with breakfast    isosorbide mononitrate  60 mg Oral QHS    [START ON 10/9/2023] metoprolol succinate  25 mg Oral Daily    polyethylene glycol  17 g Oral Daily    warfarin  5 mg Oral Once per day on Sun Wed Thu Fri    [START ON 10/9/2023] warfarin  5 mg Oral Every Mon    [START ON 10/10/2023] warfarin  7.5 mg Oral Once per day on Tue Sat     PRN Meds:0.9%  NaCl infusion (for blood administration), acetaminophen, albuterol, dextrose 10%, dextrose 10%, glucagon (human recombinant), glucose, glucose, loperamide, naloxone, ondansetron, sodium chloride 0.9%     Current  Facility-Administered Medications on File Prior to Encounter   Medication    haloperidol lactate injection 0.5 mg    HYDROmorphone injection 0.2 mg    sodium chloride 0.9% flush 10 mL     Current Outpatient Medications on File Prior to Encounter   Medication Sig    acetaminophen (TYLENOL) 500 MG tablet Take 500 mg by mouth daily as needed for Pain.    allopurinoL (ZYLOPRIM) 100 MG tablet Take 1 tablet (100 mg total) by mouth once daily.    bumetanide (BUMEX) 1 MG tablet Take 1 tablet (1 mg total) by mouth every other day.    diphenhydrAMINE (BENADRYL) 25 mg capsule Take 50 mg by mouth nightly as needed for Insomnia.    ferrous gluconate (FERGON) 324 MG tablet Take 1 tablet (324 mg total) by mouth daily with breakfast.    loperamide (IMODIUM) 2 mg capsule Take 2 mg by mouth 4 (four) times daily as needed for Diarrhea.    metoprolol succinate (TOPROL-XL) 25 MG 24 hr tablet Take 1 tablet (25 mg total) by mouth once daily.    omega-3 fatty acids/fish oil (FISH OIL-OMEGA-3 FATTY ACIDS) 300-1,000 mg capsule Take 1 capsule by mouth once daily.    oxymetazoline (AFRIN) 0.05 % nasal spray Use 2 sprays by Nasal route daily as needed (nose bleed). (Patient taking differently: 2 sprays by Nasal route every 2 (two) hours as needed (nose bleed).)    rosuvastatin (CRESTOR) 40 MG Tab TAKE 1 TABLET EVERY EVENING.    warfarin (COUMADIN) 5 MG tablet Take warfarin 7.5mg (1.5 tablets) PO Tuesday & Saturday, then take 5mg PO all other days or as instructed by [facility]    isosorbide mononitrate (IMDUR) 60 MG 24 hr tablet Take 1 tablet (60 mg total) by mouth every evening.    [DISCONTINUED] albuterol (VENTOLIN HFA) 90 mcg/actuation inhaler Inhale 2 puffs into the lungs every 6 (six) hours as needed for Wheezing. Rescue    [DISCONTINUED] enoxaparin (LOVENOX) 80 mg/0.8 mL Syrg Inject 0.7 mLs (70 mg total) into the skin Daily.     Review of patient's allergies indicates:   Allergen Reactions    Lisinopril      Losartan      Intolerance- elevates potassium level     Past Medical History:   Diagnosis Date    Anemia of chronic renal failure, stage 3 (moderate) 05/27/2015    Anticoagulant long-term use     Atherosclerosis of coronary artery bypass graft of native heart without angina pectoris 09/11/2012    3-27-18 [facility] Two vessel coronary artery disease.   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    Bilateral carotid artery disease 02/09/2017    Bleeding from the nose     Bleeding nose 03/21/2018    Cancer     Cataract     CHF (congestive heart failure)     CKD (chronic kidney disease) stage 3, GFR 30-59 ml/min 05/27/2015    Claudication of left lower extremity 09/17/2014    Colon polyp     Encounter for blood transfusion     Gastroesophageal reflux disease without esophagitis 03/19/2018    Gastrointestinal hemorrhage associated with intestinal diverticulosis 04/01/2018    Glaucoma     H/O mechanical aortic valve replacement 09/17/2014    History of gout 09/26/2012    Hyperparathyroidism due to renal insufficiency 07/27/2015    Internal hemorrhoid 04/03/2018    Long term current use of anticoagulant therapy 09/26/2012    Mechanical heart valve present     Metabolic acidosis with normal anion gap and bicarbonate losses 03/20/2018    Mixed hyperlipidemia 09/26/2012    NSTEMI (non-ST elevated myocardial infarction) 03/21/2018    Obesity, diabetes, and hypertension syndrome 02/23/2016    Paroxysmal atrial fibrillation 02/06/2023    PVD (peripheral vascular disease) 09/11/2012    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    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 02/01/2023    vertex scalp    Syncope 09/29/2022    Type 2 diabetes mellitus with diabetic peripheral angiopathy without gangrene 05/27/2015    Type 2 diabetes mellitus with stage 3 chronic kidney disease, without long-term current use of insulin 10/02/2013     Past Surgical History:   Procedure Laterality Date    BACK  SURGERY      CARDIAC CATHETERIZATION      CARDIAC VALVE REPLACEMENT      CARDIAC VALVE SURGERY      CARPAL TUNNEL RELEASE Right 05/19/2020    Procedure: RELEASE, CARPAL TUNNEL;  Surgeon: Rupesh Norris Jr., MD;  Location: [facility];  Service: Plastics;  Laterality: Right;    CATARACT EXTRACTION Left 11/13/2022        COLON SURGERY      COLONOSCOPY N/A 03/31/2017    Procedure: COLONOSCOPY;  Surgeon: Bruno Raymond MD;  Location: [facility] ENDO (4TH FLR);  Service: Endoscopy;  Laterality: N/A;  Patient's wife requesting date.    COLONOSCOPY N/A 04/03/2018    Procedure: COLONOSCOPY;  Surgeon: Bonifacio Pelletier MD;  Location: [facility] ENDO (2ND FLR);  Service: Endoscopy;  Laterality: N/A;    COLONOSCOPY N/A 08/13/2018    Procedure: COLONOSCOPY;  Surgeon: Kam Barba MD;  Location: [facility] ENDO (2ND FLR);  Service: Endoscopy;  Laterality: N/A;  2nd floor: PA pressure 49; hx of moderate-severe valve disease     per Coumadin clinic-Patient can hold 5 days with lovenox bridge       ok to schedule per Katarina    CORONARY ANGIOGRAPHY N/A 10/04/2021    Procedure: Left heart cath +/- peripheral angiogram;  Surgeon: Jose Ruiz MD;  Location: [facility] CATH LAB;  Service: Cardiology;  Laterality: N/A;    CORONARY ANGIOGRAPHY N/A 3/2/2023    Procedure: Angiogram, Coronary;  Surgeon: Devon Garnica MD;  Location: [facility] CATH LAB;  Service: Cardiology;  Laterality: N/A;    CORONARY ANGIOPLASTY      CORONARY ARTERY BYPASS GRAFT      CORONARY BYPASS GRAFT ANGIOGRAPHY  10/04/2021    Procedure: Bypass graft study;  Surgeon: Jose Ruiz MD;  Location: [facility] CATH LAB;  Service: Cardiology;;    CORONARY BYPASS GRAFT ANGIOGRAPHY  3/2/2023    Procedure: Bypass graft study;  Surgeon: Devon Garinca MD;  Location: [facility] CATH LAB;  Service: Cardiology;;    ECHOCARDIOGRAM,TRANSESOPHAGEAL N/A 4/14/2023    Procedure: Transesophageal echo (KIRSTEN) intra-procedure log documentation;  Surgeon: [facility] Diagnostic Provider;   Location: [facility] EP LAB;  Service: Cardiology;  Laterality: N/A;    EYE SURGERY      FUNCTIONAL ENDOSCOPIC SINUS SURGERY (FESS) Bilateral 6/14/2023    Procedure: FESS (FUNCTIONAL ENDOSCOPIC SINUS SURGERY);  Surgeon: Jono Russell MD;  Location: [facility] OR 2ND FLR;  Service: ENT;  Laterality: Bilateral;    HERNIA REPAIR      INTRAOCULAR PROSTHESES INSERTION Left 11/13/2022    Procedure: INSERTION, IOL PROSTHESIS;  Surgeon: Alia Mckeon MD;  Location: [facility] OR 1ST FLR;  Service: Ophthalmology;  Laterality: Left;    INTRAOCULAR PROSTHESES INSERTION Right 12/04/2022    Procedure: INSERTION, IOL PROSTHESIS;  Surgeon: Alia Mckeon MD;  Location: [facility] OR 1ST FLR;  Service: Ophthalmology;  Laterality: Right;    LIGATION, ARTERY, SPHENOPALATINE, ENDOSCOPIC Bilateral 6/14/2023    Procedure: LIGATION, ARTERY, SPHENOPALATINE, ENDOSCOPIC;  Surgeon: Jono Russell MD;  Location: [facility] OR 2ND FLR;  Service: ENT;  Laterality: Bilateral;    PHACOEMULSIFICATION OF CATARACT Left 11/13/2022    Procedure: PHACOEMULSIFICATION, CATARACT;  Surgeon: Alia Mckeon MD;  Location: [facility] OR 1ST FLR;  Service: Ophthalmology;  Laterality: Left;    PHACOEMULSIFICATION OF CATARACT Right 12/04/2022    Procedure: PHACOEMULSIFICATION, CATARACT;  Surgeon: Alia Mckeon MD;  [facility];  Service: Ophthalmology;  Laterality: Right;    SPINE SURGERY      TRANSESOPHAGEAL ECHOCARDIOGRAPHY N/A 3/22/2023    Procedure: ECHOCARDIOGRAM, TRANSESOPHAGEAL;  Surgeon: [facility] Diagnostic Provider;  Location: [facility] EP LAB;  Service: Anesthesiology;  Laterality: N/A;    TRANSESOPHAGEAL ECHOCARDIOGRAPHY N/A 4/11/2023    Procedure: ECHOCARDIOGRAM, TRANSESOPHAGEAL;  Surgeon: [facility] Diagnostic Provider;  Location: [facility] EP LAB;  Service: Anesthesiology;  Laterality: N/A;    VASECTOMY       Family History       Problem Relation (Age of Onset)    Alcohol abuse Father    Diabetes Brother    Heart disease Mother, Father, Brother, Sister     Heart failure Mother, Father, Brother    No Known Problems Sister, Maternal Grandmother, Maternal Grandfather, Paternal Grandmother, Paternal Grandfather, Maternal Aunt, Maternal Uncle, Paternal Aunt, Paternal Uncle          Tobacco Use    Smoking status: Former     Current packs/day: 0.00     Average packs/day: 1 pack/day for 20.0 years (20.0 ttl pk-yrs)     Types: Cigarettes     Start date: 1960     Quit date: 1980     Years since quittin.1     Passive exposure: Never    Smokeless tobacco: Never   Substance and Sexual Activity    Alcohol use: No    Drug use: No    Sexual activity: Not Currently     Partners: Female     Review of Systems  Objective:     Vital Signs (Most Recent):  Temp: 97.6 °F (36.4 °C) (10/08/23 09)  Pulse: (!) 113 (10/08/23 1201)  Resp: 18 (10/08/23 09)  BP: (!) 157/68 (10/08/23 1201)  SpO2: 100 % (10/08/23 120) Vital Signs (24h Range):  Temp:  [97.6 °F (36.4 °C)] 97.6 °F (36.4 °C)  Pulse:  [] 113  Resp:  [18] 18  SpO2:  [95 %-100 %] 100 %  BP: (149-167)/(67-68) 157/68     Weight: 78.9 kg (174 lb)  Body mass index is 28.96 kg/m².     Physical Exam  Resting comfortably on ED stretcher, in no acute distress  Right naris with no active bleeding  Left naris packed with RapidRhino, hemostatic  Oropharynx with no blood    Significant Labs:  BMP:   Recent Labs   Lab 10/08/23  1003   *   *   CO2 20*   BUN 64*   CREATININE 2.3*   CALCIUM 9.7     CBC:   Recent Labs   Lab 10/08/23  1003   WBC 7.98   RBC 2.52*   HGB 7.6*   HCT 24.0*      MCV 95   MCH 30.2   MCHC 31.7*     Coagulation:   Recent Labs   Lab 10/08/23  1003   LABPROT 28.5*   INR 2.8*     Significant Diagnostics:  None    Assessment/Plan:     * Epistaxis, recurrent  83M with pAF, mechanical aortic valve (on warfarin), and recurrent epistaxis s/p endoscopic SPA ligation in 2023 and more recently IR left SPA embolization in 2023. Rapid Rhino placed by ED on 10/8. Currently hemostatic.  Patient stable to go home but requesting overnight observation.     -- Okay to continue anticoagulation medication  -- Start sodium chloride nasal spray bilaterally Q4H while awake  -- Start oxymetazoline nasal spray on the left, BID for 3 days  -- Patient will need antistaphylococcal coverage while nasal packing is in place  -- Keep nasal packing for 3-5 days, ENT will coordinate outpatient follow up later this week for packing removal  -- Please Secure Chat me for any questions, page ENT on-call if unresponsive or urgent      VTE Risk Mitigation (From admission, onward)         Ordered     warfarin (COUMADIN) tablet 7.5 mg  Once per day on Tue Sat         10/08/23 1221     warfarin (COUMADIN) tablet 5 mg  Every Monday         10/08/23 1253     warfarin (COUMADIN) tablet 5 mg  Once per day on Sun Wed Thu Fri         10/08/23 1247     Reason for No Pharmacological VTE Prophylaxis  Once        Question:  Reasons:  Answer:  Already adequately anticoagulated on oral Anticoagulants    10/08/23 1221     Place sequential compression device  Until discontinued         10/08/23 1221     IP VTE HIGH RISK PATIENT  Once         10/08/23 1221                Thank you for your consult.     Tank Gomez MD  Otorhinolaryngology-Head & Neck Surgery  [facility] - Emergency Dept

## 2023-10-08 NOTE — HPI
Mr. Urbina is an 83 year-old male with a history of pAF, mechanical aortic valve (on warfarin), and recurrent epistaxis s/p bilateral endoscopic sphenopalatine artery ligation on 6/14/23 and more recently left sphenopalatine artery embolization on 10/5/23. He presents to the ED today due to persistent left epistaxis refractory to oxymetazoline nasal spray. A Rapid Rhino was placed in the left naris by the [facility] ED team. Patient requesting to stay inpatient for observation. ENT consulted for further evaluation.    Upon my arrival, patient enjoying lunch. Left naris hemostatic. No complaints.

## 2023-10-08 NOTE — SUBJECTIVE & OBJECTIVE
Interval History:  Patient was in bed, in the ED.  Accompanied by his wife.  Denies any complaints, other than discussed in HPI.    Review of Systems   All other systems reviewed and are negative.    Objective:     Vital Signs (Most Recent):  Temp: 97.6 °F (36.4 °C) (10/08/23 0921)  Pulse: (!) 113 (10/08/23 1201)  Resp: 18 (10/08/23 0921)  BP: (!) 157/68 (10/08/23 1201)  SpO2: 100 % (10/08/23 1201) Vital Signs (24h Range):  Temp:  [97.6 °F (36.4 °C)] 97.6 °F (36.4 °C)  Pulse:  [] 113  Resp:  [18] 18  SpO2:  [95 %-100 %] 100 %  BP: (149-167)/(67-68) 157/68     Weight: 78.9 kg (174 lb)  Body mass index is 28.96 kg/m².  No intake or output data in the 24 hours ending 10/08/23 1227      Physical Exam  Constitutional:       General: He is not in acute distress.     Appearance: Normal appearance. He is not toxic-appearing.   HENT:      Head: Normocephalic and atraumatic.      Nose:      Comments: Rhino pack, left nostril.  No bleeding currently     Mouth/Throat:      Mouth: Mucous membranes are dry.   Eyes:      General: No scleral icterus.     Conjunctiva/sclera: Conjunctivae normal.   Cardiovascular:      Rate and Rhythm: Rhythm irregular.      Pulses: Normal pulses.      Heart sounds: Normal heart sounds.      No friction rub.   Pulmonary:      Effort: Pulmonary effort is normal.      Breath sounds: Normal breath sounds. No wheezing or rales.   Abdominal:      General: Abdomen is flat.      Palpations: Abdomen is soft.   Musculoskeletal:      Right lower leg: No edema.      Left lower leg: No edema.   Skin:     General: Skin is warm and dry.      Coloration: Skin is not jaundiced.   Neurological:      Mental Status: He is alert and oriented to person, place, and time.             Significant Labs: All pertinent labs within the past 24 hours have been reviewed.    Significant Imaging: I have reviewed all pertinent imaging results/findings within the past 24 hours.

## 2023-10-08 NOTE — ED TRIAGE NOTES
Nose bleed from left nostril onset Friday, on and off, started again this morning 6am, not resolving, used afrin at home, pt takes warfarin. Has ENT appt Monday but could not wait.

## 2023-10-08 NOTE — H&P
[facility] - Emergency Dept  [facility] Medicine  History & Physical    Patient Name: Darile Urbina  MRN: [id number]  Patient Class: OP- Observation  Admission Date: 10/8/2023  Attending Physician: Mike Costello DO   Primary Care Provider: Devon Langston Jr., MD         Patient information was obtained from patient, spouse/SO and ER records.     Subjective:     Principal Problem:Epistaxis, recurrent    Chief Complaint:   Chief Complaint   Patient presents with    Epistaxis     Nose bleed from left nostril onset Friday, on and off, started again this morning 6am, not resolving, used afrin at home, pt takes warfarin        HPI: Mr. Urbina is an 83-year-old male who presented for recurrent epistaxis, last episode was 10/05 and had an elective sphenopalatine artery embolization.  He has a PMH of mechanical AV replacement on coumadin, pAfib, CKD-III, anemia of CKD, GI bleed, DM 2.  Patient reports that the bleeding episode started today morning. He tried Afrin without relief. Associated with hemoptysis/spitting up blood, which patient attributes to every episode of epistaxis that he had before.  He denies any hemoptysis prior to his nosebleed.  Reports that he has not had his morning medicine yet.  He denies chest pain, abdominal pain, dark tarry stool, hematuria.  He denies any complaints other than his epistaxis.  ED provider inserted rhino pack, left nostril.      Interval History:  Patient was in bed, in the ED.  Accompanied by his wife.  Denies any complaints, other than discussed in HPI.    Review of Systems   All other systems reviewed and are negative.    Objective:     Vital Signs (Most Recent):  Temp: 97.6 °F (36.4 °C) (10/08/23 0921)  Pulse: (!) 113 (10/08/23 1201)  Resp: 18 (10/08/23 0921)  BP: (!) 157/68 (10/08/23 1201)  SpO2: 100 % (10/08/23 1201) Vital Signs (24h Range):  Temp:  [97.6 °F (36.4 °C)] 97.6 °F (36.4 °C)  Pulse:  [] 113  Resp:  [18] 18  SpO2:  [95 %-100 %] 100 %  BP:  (149-167)/(67-68) 157/68     Weight: 78.9 kg (174 lb)  Body mass index is 28.96 kg/m².  No intake or output data in the 24 hours ending 10/08/23 1227      Physical Exam  Constitutional:       General: He is not in acute distress.     Appearance: Normal appearance. He is not toxic-appearing.   HENT:      Head: Normocephalic and atraumatic.      Nose:      Comments: Rhino pack, left nostril.  No bleeding currently     Mouth/Throat:      Mouth: Mucous membranes are dry.   Eyes:      General: No scleral icterus.     Conjunctiva/sclera: Conjunctivae normal.   Cardiovascular:      Rate and Rhythm: Rhythm irregular.      Pulses: Normal pulses.      Heart sounds: Normal heart sounds.      No friction rub.   Pulmonary:      Effort: Pulmonary effort is normal.      Breath sounds: Normal breath sounds. No wheezing or rales.   Abdominal:      General: Abdomen is flat.      Palpations: Abdomen is soft.   Musculoskeletal:      Right lower leg: No edema.      Left lower leg: No edema.   Skin:     General: Skin is warm and dry.      Coloration: Skin is not jaundiced.   Neurological:      Mental Status: He is alert and oriented to person, place, and time.             Significant Labs: All pertinent labs within the past 24 hours have been reviewed.    Significant Imaging: I have reviewed all pertinent imaging results/findings within the past 24 hours.    Assessment/Plan:     * Epistaxis, recurrent  -- rhino pack, left nostril, no bleeding currently   -- trending H&H q.8 hours.  -- transfusion 1 unit of blood, history of CAD s/p CABG 1990s    Acute blood loss anemia  -- 2/2 epistaxis.   -- please refer to epistaxis for plan.       H/O mechanical aortic valve replacement  -- continue with Coumadin 7.5 mg on Tuesday and Saturdays, and 5 mg other days.      Anticoagulant long-term use  -- continue warfarin, 7.5 mg Tuesday and Saturday.  5 mg other days.        VTE Risk Mitigation (From admission, onward)         Ordered     warfarin  (COUMADIN) tablet 7.5 mg  Once per day on Tue Sat         10/08/23 1221     warfarin (COUMADIN) tablet 5 mg  Every Monday         10/08/23 1253     warfarin (COUMADIN) tablet 5 mg  Once per day on Sun Wed Thu Fri         10/08/23 1247     Reason for No Pharmacological VTE Prophylaxis  Once        Question:  Reasons:  Answer:  Already adequately anticoagulated on oral Anticoagulants    10/08/23 1221     Place sequential compression device  Until discontinued         10/08/23 1221     IP VTE HIGH RISK PATIENT  Once         10/08/23 1221                   On 10/08/2023, patient should be placed in hospital observation services under my care.        Mike Costello DO  Department of Hospital Medicine  [facility] - Emergency Dept

## 2023-10-08 NOTE — ASSESSMENT & PLAN NOTE
83M with pAF, mechanical aortic valve (on warfarin), and recurrent epistaxis s/p endoscopic SPA ligation in June 2023 and more recently IR left SPA embolization in October 2023. Rapid Rhino placed by ED on 10/8. Currently hemostatic. Patient stable to go home but requesting overnight observation.     -- Okay to continue anticoagulation medication  -- Start sodium chloride nasal spray bilaterally Q4H while awake  -- Start oxymetazoline nasal spray on the left, BID for 3 days  -- Patient will need antistaphylococcal coverage while nasal packing is in place  -- Keep nasal packing for 3-5 days, ENT will coordinate outpatient follow up later this week for packing removal  -- Please Secure Chat me for any questions, page ENT on-call if unresponsive or urgent

## 2023-10-08 NOTE — ED PROVIDER NOTES
Encounter Date: 10/8/2023       History     Chief Complaint   Patient presents with    Epistaxis     Nose bleed from left nostril onset Friday, on and off, started again this morning 6am, not resolving, used afrin at home, pt takes warfarin     84 yo M with extensive pmhx including CKD-III, anemia of CKD, CA, GI bleed, pAfib, mechanical AV replacement on coumadin, DM 2, recurrent epistaxis s/p L sphenopalatine artery ligation (6/14/23) and recent embolization (10/5/23) presents with a chief complaint of epistaxis.  Patient notes that since they of discharge epistaxis from the left nostril has persisted.  It has been intermittent throughout the the past 3 days.  Since 6:00 a.m. this morning it has been constant.  However, patient notes that immediately as I came into the room, it stopped.  He reports weakness, lightheadedness it is worsened over the past few days as well as chronic shortness of breath that is not necessarily worsened.  No trauma to the nose.  He tried Afrin pressure without relief.  He is planning to follow-up with the [facility] tomorrow.  He was discharged with an INR of 2.2.  He was encouraged to follow-up with ENT tomorrow but has not yet had an appointment scheduled.        Review of patient's allergies indicates:   Allergen Reactions    Lisinopril     Losartan      Intolerance- elevates potassium level     Past Medical History:   Diagnosis Date    Anemia of chronic renal failure, stage 3 (moderate) 05/27/2015    Anticoagulant long-term use     Atherosclerosis of coronary artery bypass graft of native heart without angina pectoris 09/11/2012    3-27-18 [facility] Two vessel coronary artery disease.   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    Bilateral carotid artery disease 02/09/2017    Bleeding from the nose     Bleeding nose 03/21/2018    Cancer     Cataract     CHF (congestive heart failure)     CKD (chronic kidney disease) stage 3, GFR 30-59 ml/min 05/27/2015     Claudication of left lower extremity 09/17/2014    Colon polyp     Encounter for blood transfusion     Gastroesophageal reflux disease without esophagitis 03/19/2018    Gastrointestinal hemorrhage associated with intestinal diverticulosis 04/01/2018    Glaucoma     H/O mechanical aortic valve replacement 09/17/2014    History of gout 09/26/2012    Hyperparathyroidism due to renal insufficiency 07/27/2015    Internal hemorrhoid 04/03/2018    Long term current use of anticoagulant therapy 09/26/2012    Mechanical heart valve present     Metabolic acidosis with normal anion gap and bicarbonate losses 03/20/2018    Mixed hyperlipidemia 09/26/2012    NSTEMI (non-ST elevated myocardial infarction) 03/21/2018    Obesity, diabetes, and hypertension syndrome 02/23/2016    Paroxysmal atrial fibrillation 02/06/2023    PVD (peripheral vascular disease) 09/11/2012    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     Past Surgical History:   Procedure Laterality Date    BACK SURGERY      CARDIAC CATHETERIZATION      CARDIAC VALVE REPLACEMENT      CARDIAC VALVE SURGERY      CARPAL TUNNEL RELEASE Right 05/19/2020    Procedure: RELEASE, CARPAL TUNNEL;  Surgeon: Rupesh Norris Jr., MD;  [facility];  Service: Plastics;  Laterality: Right;    CATARACT EXTRACTION Left 11/13/2022        COLON SURGERY      COLONOSCOPY N/A 03/31/2017    Procedure: COLONOSCOPY;  Surgeon: Bruno Raymond MD;  Location: [facility] (4TH FLR);  Service: Endoscopy;  Laterality: N/A;  Patient's wife requesting date.    COLONOSCOPY N/A 04/03/2018    Procedure: COLONOSCOPY;  Surgeon: Bonifacio Pelletier MD;  Location: [facility] ENDO (2ND FLR);  Service:  Endoscopy;  Laterality: N/A;    COLONOSCOPY N/A 08/13/2018    Procedure: COLONOSCOPY;  Surgeon: Kam Barba MD;  Location: [facility] ENDO (2ND FLR);  Service: Endoscopy;  Laterality: N/A;  2nd floor: PA pressure 49; hx of moderate-severe valve disease     per Coumadin clinic-Patient can hold 5 days with lovenox bridge       ok to schedule per Katarina    CORONARY ANGIOGRAPHY N/A 10/04/2021    Procedure: Left heart cath +/- peripheral angiogram;  Surgeon: Jose Ruiz MD;  Location: [facility] CATH LAB;  Service: Cardiology;  Laterality: N/A;    CORONARY ANGIOGRAPHY N/A 3/2/2023    Procedure: Angiogram, Coronary;  Surgeon: Devon Garnica MD;  Location: [facility] CATH LAB;  Service: Cardiology;  Laterality: N/A;    CORONARY ANGIOPLASTY      CORONARY ARTERY BYPASS GRAFT      CORONARY BYPASS GRAFT ANGIOGRAPHY  10/04/2021    Procedure: Bypass graft study;  Surgeon: Jose Ruiz MD;  Location: [facility] CATH LAB;  Service: Cardiology;;    CORONARY BYPASS GRAFT ANGIOGRAPHY  3/2/2023    Procedure: Bypass graft study;  Surgeon: Devon Garnica MD;  Location: [facility] CATH LAB;  Service: Cardiology;;    ECHOCARDIOGRAM,TRANSESOPHAGEAL N/A 4/14/2023    Procedure: Transesophageal echo (KIRSTEN) intra-procedure log documentation;  Surgeon: [facility] Diagnostic Provider;  Location: [facility] EP LAB;  Service: Cardiology;  Laterality: N/A;    EYE SURGERY      FUNCTIONAL ENDOSCOPIC SINUS SURGERY (FESS) Bilateral 6/14/2023    Procedure: FESS (FUNCTIONAL ENDOSCOPIC SINUS SURGERY);  Surgeon: Jono Russell MD;  Location: [facility] 2ND FLR;  Service: ENT;  Laterality: Bilateral;    HERNIA REPAIR      INTRAOCULAR PROSTHESES INSERTION Left 11/13/2022    Procedure: INSERTION, IOL PROSTHESIS;  Surgeon: Alia Mckeon MD;  Location: [facility] OR 1ST FLR;  Service: Ophthalmology;  Laterality: Left;    INTRAOCULAR PROSTHESES INSERTION Right 12/04/2022    Procedure: INSERTION, IOL PROSTHESIS;  Surgeon: Alia Mckeon MD;  Location: [facility] OR 1ST FLR;  Service:  Ophthalmology;  Laterality: Right;    LIGATION, ARTERY, SPHENOPALATINE, ENDOSCOPIC Bilateral 6/14/2023    Procedure: LIGATION, ARTERY, SPHENOPALATINE, ENDOSCOPIC;  Surgeon: Jono Russell MD;  Location: [facility] OR 2ND FLR;  Service: ENT;  Laterality: Bilateral;    PHACOEMULSIFICATION OF CATARACT Left 11/13/2022    Procedure: PHACOEMULSIFICATION, CATARACT;  Surgeon: Alia Mckeon MD;  Location: [facility] OR 1ST FLR;  Service: Ophthalmology;  Laterality: Left;    PHACOEMULSIFICATION OF CATARACT Right 12/04/2022    Procedure: PHACOEMULSIFICATION, CATARACT;  Surgeon: Alia Mckeon MD;  Location: [facility] OR 1ST FLR;  Service: Ophthalmology;  Laterality: Right;    SPINE SURGERY      TRANSESOPHAGEAL ECHOCARDIOGRAPHY N/A 3/22/2023    Procedure: ECHOCARDIOGRAM, TRANSESOPHAGEAL;  Surgeon: [facility] Diagnostic Provider;  Location: [facility] EP LAB;  Service: Anesthesiology;  Laterality: N/A;    TRANSESOPHAGEAL ECHOCARDIOGRAPHY N/A 4/11/2023    Procedure: ECHOCARDIOGRAM, TRANSESOPHAGEAL;  Surgeon: [facility] Diagnostic Provider;  Location: [facility] EP LAB;  Service: Anesthesiology;  Laterality: N/A;    VASECTOMY       Family History   Problem Relation Age of Onset    Heart failure Mother     Heart disease Mother     Heart failure Father     Heart disease Father     Alcohol abuse Father     Heart failure Brother     Heart disease Brother     Diabetes Brother     No Known Problems Sister     No Known Problems Maternal Grandmother     No Known Problems Maternal Grandfather     No Known Problems Paternal Grandmother     No Known Problems Paternal Grandfather     Heart disease Sister     No Known Problems Maternal Aunt     No Known Problems Maternal Uncle     No Known Problems Paternal Aunt     No Known Problems Paternal Uncle     Amblyopia Neg Hx     Blindness Neg Hx     Cancer Neg Hx     Cataracts Neg Hx     Glaucoma Neg Hx     Hypertension Neg Hx     Macular degeneration Neg Hx     Retinal detachment Neg Hx     Strabismus Neg Hx     Stroke Neg Hx      "Thyroid disease Neg Hx     Anemia Neg Hx     Arrhythmia Neg Hx     Asthma Neg Hx     Clotting disorder Neg Hx     Fainting Neg Hx     Heart attack Neg Hx     Hyperlipidemia Neg Hx     Atrial Septal Defect Neg Hx     Melanoma Neg Hx      Social History     Tobacco Use    Smoking status: Former     Current packs/day: 0.00     Average packs/day: 1 pack/day for 20.0 years (20.0 ttl pk-yrs)     Types: Cigarettes     Start date: 1960     Quit date: 1980     Years since quittin.1     Passive exposure: Never    Smokeless tobacco: Never   Substance Use Topics    Alcohol use: No    Drug use: No     Review of Systems    Physical Exam     Initial Vitals [10/08/23 0921]   BP Pulse Resp Temp SpO2   (!) 167/67 77 18 97.6 °F (36.4 °C) 96 %      MAP       --         Physical Exam    Nursing note and vitals reviewed.  Constitutional: He appears well-developed and well-nourished. He is not diaphoretic. No distress.   HENT:   Head: Normocephalic.   Large bloody clot left nostril without active bleeding  Multiple paper towels with blood spotting where patient applied pressure earlier  No bleeding in posterior oropharynx   Cardiovascular:  A regularly irregular rhythm present.           Murmur heard.  Pulmonary/Chest: Breath sounds normal. No stridor. No respiratory distress. He has no wheezes. He has no rhonchi. He has no rales. He exhibits no tenderness.   Musculoskeletal:         General: No tenderness. Normal range of motion.     Neurological: He is alert and oriented to person, place, and time.   Skin: Skin is warm and dry. Capillary refill takes less than 2 seconds.   Psychiatric: Thought content normal.         ED Course   Epistaxis Mgmt    Date/Time: 10/8/2023 10:37 AM    Performed by: Adam Garcia MD  Authorized by: Adam Garcia MD  Consent Done: Yes  Consent: Verbal consent obtained.  Patient identity confirmed: name and verbally with patient  Time out: Immediately prior to procedure a "time out" was " called to verify the correct patient, procedure, equipment, support staff and site/side marked as required.  Treatment site: left anterior  Repair method: Rhino Rocket  Post-procedure assessment: bleeding stopped  Treatment complexity: simple  Patient tolerance: Patient tolerated the procedure well with no immediate complications        Labs Reviewed   CBC W/ AUTO DIFFERENTIAL - Abnormal; Notable for the following components:       Result Value    RBC 2.52 (*)     Hemoglobin 7.6 (*)     Hematocrit 24.0 (*)     MCHC 31.7 (*)     RDW 15.5 (*)     Gran % 76.4 (*)     Lymph % 12.7 (*)     All other components within normal limits    Narrative:     Release to patient->Immediate   BASIC METABOLIC PANEL - Abnormal; Notable for the following components:    Chloride 113 (*)     CO2 20 (*)     Glucose 124 (*)     BUN 64 (*)     Creatinine 2.3 (*)     eGFR 27.7 (*)     All other components within normal limits    Narrative:     Release to patient->Immediate   PROTIME-INR - Abnormal; Notable for the following components:    Prothrombin Time 28.5 (*)     INR 2.8 (*)     All other components within normal limits    Narrative:     Release to patient->Immediate   HIV 1 / 2 ANTIBODY    Narrative:     Release to patient->Immediate   HEPATITIS C ANTIBODY    Narrative:     Release to patient->Immediate          Imaging Results    None          Medications   metoprolol succinate (TOPROL-XL) 24 hr tablet 25 mg (has no administration in time range)   amoxicillin capsule 1,000 mg (has no administration in time range)   oxymetazoline 0.05 % nasal spray 1 spray (1 spray Each Nostril Given by Provider 10/8/23 0937)     Medical Decision Making  82 yo M with extensive pmhx including CKD-III, anemia of CKD, CA, GI bleed, pAfib, mechanical AV replacement on coumadin, DM 2, recurrent epistaxis s/p L sphenopalatine artery ligation (6/14/23) and recent embolization (10/5/23) presents with a chief complaint of epistaxis.     My differential includes,  but is not limited to:  Epistaxis, anterior epistaxis, posterior epistaxis, coagulopathy, anemia    Patient has an extensive history of epistaxis.  And has undergone multiple surgical repairs. He is mostly hemostatic but has a large clot that is concerning for impending unstable epistaxis.  I had him blow his nose and I do not see any obvious bleeding area amenable to silver nitrate sticks.  Through shared decision-making, we elected to proceed with rhino rocket which was placed as above.  Will obtain labs and reassess.    Reassessment:  BMP with creatinine at 2.3 at baseline.  INR is therapeutic at 2.8.  CBC without leukocytosis.  Hemoglobin is 7.6 is down from 8.9 from 3 days ago.  On repeat assessment, he is no bleeding from around the rhino rocket and no blood in posterior oropharynx.  He appears hemostatic.  He has no hypotension and vitals are stable.  Case discussed with ENT resident on-call and as patient is hemostatic, there is no emergent intervention the patient warrants from an ENT perspective.  However, given acute on chronic anemia, anticoagulation, and complicated surgical history, will place in observation under medicine.  Will administer amoxicillin for sinusitis prophylaxis.  Will administer his morning dose of metoprolol succinate 25 mg given that he is hypertensive.    Amount and/or Complexity of Data Reviewed  Labs: ordered.    Risk  OTC drugs.  Prescription drug management.                               Clinical Impression:   Final diagnoses:  [R04.0] Epistaxis, recurrent (Primary)  [Z79.01] Anticoagulated on Coumadin        ED Disposition Condition    Observation Stable                Adam Garcia MD  10/08/23 4598

## 2023-10-08 NOTE — ED NOTES
Patient identifiers verified and correct for Dariel Urbina   LOC: The patient is [facility] x 3  APPEARANCE: Patient appears uncomfortable but in no acute distress. Pt c/o nose bleed in L nostril   SKIN: The skin is warm and dry, color consistent with ethnicity, patient has normal skin turgor and moist mucus membranes, skin intact, no breakdown or bruising noted.   MUSCULOSKELETAL: Patient moving all extremities spontaneously, no swelling noted.  RESPIRATORY: Airway is open and patent, respirations are spontaneous, patient has a normal effort and rate, no accessory muscle use noted, pt placed on continuous pulse ox with O2 sats noted at 96% on room air.  CARDIAC: Pt placed on cardiac monitor. Patient has a normal rate and regular rhythm, no edema noted, capillary refill < 3 seconds.   GASTRO: Soft and non tender to palpation, no distention noted, normoactive bowel sounds present in all four quadrants.   : Pt denies any pain or frequency with urination.  NEURO: Pt opens eyes spontaneously, behavior appropriate to situation, follows commands, facial expression symmetrical, bilateral hand grasp equal and even, purposeful motor response noted, normal sensation in all extremities when touched with a finger.

## 2023-10-08 NOTE — HPI
Mr. Urbina is an 83-year-old male who presented for recurrent epistaxis, last episode was 10/05 and had an elective sphenopalatine artery embolization.  He has a PMH of mechanical AV replacement on coumadin, pAfib, CKD-III, anemia of CKD, GI bleed, DM 2.  Patient reports that the bleeding episode started today morning. He tried Afrin without relief. Associated with hemoptysis/spitting up blood, which patient attributes to every episode of epistaxis that he had before.  He denies any hemoptysis prior to his nosebleed.  Reports that he has not had his morning medicine yet.  He denies chest pain, abdominal pain, dark tarry stool, hematuria.  He denies any complaints other than his epistaxis.  ED provider inserted rhino pack, left nostril.

## 2023-10-09 VITALS
HEART RATE: 67 BPM | DIASTOLIC BLOOD PRESSURE: 64 MMHG | WEIGHT: 174 LBS | SYSTOLIC BLOOD PRESSURE: 155 MMHG | OXYGEN SATURATION: 98 % | HEIGHT: 65 IN | BODY MASS INDEX: 28.99 KG/M2 | RESPIRATION RATE: 18 BRPM | TEMPERATURE: 98 F

## 2023-10-09 PROBLEM — D62 ACUTE BLOOD LOSS ANEMIA: Status: RESOLVED | Noted: 2022-12-21 | Resolved: 2023-10-09

## 2023-10-09 PROBLEM — R04.0 EPISTAXIS, RECURRENT: Status: RESOLVED | Noted: 2022-12-21 | Resolved: 2023-10-09

## 2023-10-09 LAB
ALBUMIN SERPL BCP-MCNC: 3.2 G/DL (ref 3.5–5.2)
ALP SERPL-CCNC: 73 U/L (ref 55–135)
ALT SERPL W/O P-5'-P-CCNC: 14 U/L (ref 10–44)
ANION GAP SERPL CALC-SCNC: 6 MMOL/L (ref 8–16)
AST SERPL-CCNC: 21 U/L (ref 10–40)
BILIRUB SERPL-MCNC: 1.3 MG/DL (ref 0.1–1)
BUN SERPL-MCNC: 54 MG/DL (ref 8–23)
CALCIUM SERPL-MCNC: 9.9 MG/DL (ref 8.7–10.5)
CHLORIDE SERPL-SCNC: 112 MMOL/L (ref 95–110)
CO2 SERPL-SCNC: 19 MMOL/L (ref 23–29)
CREAT SERPL-MCNC: 1.8 MG/DL (ref 0.5–1.4)
ERYTHROCYTE [DISTWIDTH] IN BLOOD BY AUTOMATED COUNT: 15.4 % (ref 11.5–14.5)
EST. GFR  (NO RACE VARIABLE): 37.1 ML/MIN/1.73 M^2
GLUCOSE SERPL-MCNC: 114 MG/DL (ref 70–110)
HCT VFR BLD AUTO: 26.8 % (ref 40–54)
HCT VFR BLD AUTO: 27.1 % (ref 40–54)
HCT VFR BLD AUTO: 29.3 % (ref 40–54)
HGB BLD-MCNC: 8.5 G/DL (ref 14–18)
HGB BLD-MCNC: 8.7 G/DL (ref 14–18)
HGB BLD-MCNC: 9.2 G/DL (ref 14–18)
MCH RBC QN AUTO: 30.2 PG (ref 27–31)
MCHC RBC AUTO-ENTMCNC: 32.5 G/DL (ref 32–36)
MCV RBC AUTO: 93 FL (ref 82–98)
PLATELET # BLD AUTO: 149 K/UL (ref 150–450)
PMV BLD AUTO: 10.7 FL (ref 9.2–12.9)
POTASSIUM SERPL-SCNC: 4.5 MMOL/L (ref 3.5–5.1)
PROT SERPL-MCNC: 6.8 G/DL (ref 6–8.4)
RBC # BLD AUTO: 2.88 M/UL (ref 4.6–6.2)
SODIUM SERPL-SCNC: 137 MMOL/L (ref 136–145)
WBC # BLD AUTO: 6.83 K/UL (ref 3.9–12.7)

## 2023-10-09 PROCEDURE — 85027 COMPLETE CBC AUTOMATED: CPT | Mod: HCNC | Performed by: STUDENT IN AN ORGANIZED HEALTH CARE EDUCATION/TRAINING PROGRAM

## 2023-10-09 PROCEDURE — 85014 HEMATOCRIT: CPT | Mod: 91,HCNC | Performed by: STUDENT IN AN ORGANIZED HEALTH CARE EDUCATION/TRAINING PROGRAM

## 2023-10-09 PROCEDURE — G0378 HOSPITAL OBSERVATION PER HR: HCPCS | Mod: HCNC

## 2023-10-09 PROCEDURE — 36415 COLL VENOUS BLD VENIPUNCTURE: CPT | Mod: HCNC | Performed by: STUDENT IN AN ORGANIZED HEALTH CARE EDUCATION/TRAINING PROGRAM

## 2023-10-09 PROCEDURE — 25000003 PHARM REV CODE 250: Mod: HCNC | Performed by: STUDENT IN AN ORGANIZED HEALTH CARE EDUCATION/TRAINING PROGRAM

## 2023-10-09 PROCEDURE — 85018 HEMOGLOBIN: CPT | Mod: HCNC,91 | Performed by: STUDENT IN AN ORGANIZED HEALTH CARE EDUCATION/TRAINING PROGRAM

## 2023-10-09 PROCEDURE — 80053 COMPREHEN METABOLIC PANEL: CPT | Mod: HCNC | Performed by: STUDENT IN AN ORGANIZED HEALTH CARE EDUCATION/TRAINING PROGRAM

## 2023-10-09 PROCEDURE — 99238 HOSP IP/OBS DSCHRG MGMT 30/<: CPT | Mod: ,,, | Performed by: STUDENT IN AN ORGANIZED HEALTH CARE EDUCATION/TRAINING PROGRAM

## 2023-10-09 PROCEDURE — 85014 HEMATOCRIT: CPT | Mod: HCNC | Performed by: STUDENT IN AN ORGANIZED HEALTH CARE EDUCATION/TRAINING PROGRAM

## 2023-10-09 PROCEDURE — 99238 PR HOSPITAL DISCHARGE DAY,<30 MIN: ICD-10-PCS | Mod: ,,, | Performed by: STUDENT IN AN ORGANIZED HEALTH CARE EDUCATION/TRAINING PROGRAM

## 2023-10-09 PROCEDURE — 85018 HEMOGLOBIN: CPT | Mod: 91,HCNC | Performed by: STUDENT IN AN ORGANIZED HEALTH CARE EDUCATION/TRAINING PROGRAM

## 2023-10-09 RX ORDER — AMOXICILLIN AND CLAVULANATE POTASSIUM 875; 125 MG/1; MG/1
1 TABLET, FILM COATED ORAL EVERY 12 HOURS
Status: DISCONTINUED | OUTPATIENT
Start: 2023-10-09 | End: 2023-10-09 | Stop reason: HOSPADM

## 2023-10-09 RX ORDER — ACETAMINOPHEN 325 MG/1
650 TABLET ORAL EVERY 6 HOURS
Status: DISCONTINUED | OUTPATIENT
Start: 2023-10-09 | End: 2023-10-09 | Stop reason: HOSPADM

## 2023-10-09 RX ORDER — HYDRALAZINE HYDROCHLORIDE 25 MG/1
25 TABLET, FILM COATED ORAL EVERY 8 HOURS PRN
Status: DISCONTINUED | OUTPATIENT
Start: 2023-10-09 | End: 2023-10-09 | Stop reason: HOSPADM

## 2023-10-09 RX ORDER — AMOXICILLIN AND CLAVULANATE POTASSIUM 875; 125 MG/1; MG/1
1 TABLET, FILM COATED ORAL EVERY 12 HOURS
Qty: 5 TABLET | Refills: 0 | Status: SHIPPED | OUTPATIENT
Start: 2023-10-09 | End: 2023-12-15

## 2023-10-09 RX ADMIN — METOPROLOL SUCCINATE 25 MG: 25 TABLET, EXTENDED RELEASE ORAL at 08:10

## 2023-10-09 RX ADMIN — Medication 324 MG: at 08:10

## 2023-10-09 RX ADMIN — ALLOPURINOL 100 MG: 100 TABLET ORAL at 08:10

## 2023-10-09 RX ADMIN — ACETAMINOPHEN 650 MG: 325 TABLET ORAL at 09:10

## 2023-10-09 RX ADMIN — AMOXICILLIN AND CLAVULANATE POTASSIUM 1 TABLET: 875; 125 TABLET, FILM COATED ORAL at 08:10

## 2023-10-09 RX ADMIN — BUMETANIDE 1 MG: 1 TABLET ORAL at 08:10

## 2023-10-09 NOTE — PLAN OF CARE
Problem: Anemia  Goal: Anemia Symptom Improvement  Outcome: Ongoing, Progressing     Problem: Adult Inpatient Plan of Care  Goal: Patient-Specific Goal (Individualized)  Outcome: Ongoing, Progressing   Pt with rhino rocket/packing to left nare for control of bleeding. Pt had some drainage pink tinged overnight but not bright red blood. Pt uncomfortable not much sleep overnight. Pt ambulating without issues. Pt bed in low position call bell in reach and wheels locked.

## 2023-10-09 NOTE — PROGRESS NOTES
10/09/23  Wife called to cancel today's lab appointment due to Patient being in the hospital, he was re admitted 10/08 advised wife to call coumadin clinic once the Patient is discharged from the hospital

## 2023-10-09 NOTE — SUBJECTIVE & OBJECTIVE
Interval History: NAEON. No bleeding from left naris or mouth.     Medications:  Continuous Infusions:  Scheduled Meds:   allopurinoL  100 mg Oral Daily    amoxicillin-clavulanate 875-125mg  1 tablet Oral Q12H    atorvastatin  40 mg Oral Daily    bumetanide  1 mg Oral Every other day    ferrous gluconate  324 mg Oral Daily with breakfast    isosorbide mononitrate  60 mg Oral QHS    metoprolol succinate  25 mg Oral Daily    polyethylene glycol  17 g Oral Daily    warfarin  5 mg Oral Once per day on Sun Wed Thu Fri    warfarin  5 mg Oral Every Mon    [START ON 10/10/2023] warfarin  7.5 mg Oral Once per day on Tue Sat     PRN Meds:0.9%  NaCl infusion (for blood administration), acetaminophen, albuterol, dextrose 10%, dextrose 10%, glucagon (human recombinant), glucose, glucose, hydrALAZINE, loperamide, naloxone, ondansetron, sodium chloride 0.9%     Review of patient's allergies indicates:   Allergen Reactions    Lisinopril     Losartan      Intolerance- elevates potassium level     Objective:     Vital Signs (24h Range):  Temp:  [97.5 °F (36.4 °C)-98.6 °F (37 °C)] 97.5 °F (36.4 °C)  Pulse:  [] 67  Resp:  [16-19] 18  SpO2:  [95 %-100 %] 98 %  BP: (134-179)/(64-87) 134/65       Lines/Drains/Airways       Peripheral Intravenous Line  Duration                  Peripheral IV - Single Lumen 10/08/23 0959 20 G Left Forearm <1 day                     Physical Exam  Resting comfortably on hospital bed, in no acute distress  Right naris with no active bleeding  Left naris packed with inflatable RapidRhino, hemostatic  Oropharynx with no blood    Significant Labs:  BMP:   Recent Labs   Lab 10/09/23  0456   *   *   CO2 19*   BUN 54*   CREATININE 1.8*   CALCIUM 9.9     CBC:   Recent Labs   Lab 10/09/23  0456 10/09/23  0755   WBC 6.83  --    RBC 2.88*  --    HGB 8.7* 9.2*   HCT 26.8* 29.3*   *  --    MCV 93  --    MCH 30.2  --    MCHC 32.5  --      Significant Diagnostics:  None

## 2023-10-09 NOTE — ASSESSMENT & PLAN NOTE
Patient to follow-up with ENT outpatient planned for Wednesday.  Continue Augmentin until seen by ENT for further recommendations.  Appreciate ENT assistance.

## 2023-10-09 NOTE — PLAN OF CARE
[facility] - [facility] ([facility])  Discharge Assessment    Primary Care Provider: Devon Langston Jr., MD     Discharge Assessment (most recent)       BRIEF DISCHARGE ASSESSMENT - 10/09/23 1015          Discharge Planning    Assessment Type Discharge Planning Brief Assessment (P)      Resource/Environmental Concerns none (P)      Support Systems Spouse/significant other (P)      Assistance Needed none (P)      Equipment Currently Used at Home none (P)      Current Living Arrangements home (P)      Patient/Family Anticipates Transition to home with family (P)      Patient/Family Anticipated Services at Transition none (P)      DME Needed Upon Discharge  none (P)      Discharge Plan A Home (P)      Discharge Plan B Home with family (P)         Physical Activity    On average, how many days per week do you engage in moderate to strenuous exercise (like a brisk walk)? 0 days (P)      On average, how many minutes do you engage in exercise at this level? 0 min (P)         Financial Resource Strain    How hard is it for you to pay for the very basics like food, housing, medical care, and heating? Not hard at all (P)         Housing Stability    In the last 12 months, was there a time when you were not able to pay the mortgage or rent on time? No (P)      In the last 12 months, how many places have you lived? 1 (P)      In the last 12 months, was there a time when you did not have a steady place to sleep or slept in a shelter (including now)? No (P)         Transportation Needs    In the past 12 months, has lack of transportation kept you from medical appointments or from getting medications? No (P)      In the past 12 months, has lack of transportation kept you from meetings, work, or from getting things needed for daily living? No (P)         Food Insecurity    Within the past 12 months, you worried that your food would run out before you got the money to buy more. Never true (P)      Within the past 12 months,  the food you bought just didn't last and you didn't have money to get more. Never true (P)         Stress    Do you feel stress - tense, restless, nervous, or anxious, or unable to sleep at night because your mind is troubled all the time - these days? Not at all (P)         Social Connections    In a typical week, how many times do you talk on the phone with family, friends, or neighbors? More than three times a week (P)      How often do you get together with friends or relatives? More than three times a week (P)      How often do you attend Nondenominational or Hindu services? Never (P)      Do you belong to any clubs or organizations such as Nondenominational groups, unions, fraternal or athletic groups, or school groups? No (P)      How often do you attend meetings of the clubs or organizations you belong to? Never (P)      Are you , , , , never , or living with a partner?  (P)         Alcohol Use    Q1: How often do you have a drink containing alcohol? Never (P)      Q2: How many drinks containing alcohol do you have on a typical day when you are drinking? Patient does not drink (P)      Q3: How often do you have six or more drinks on one occasion? Never (P)                  CM spoke with pt and spouse in room.  Pt lives in 1 story home with wife, came from home.  Wife will give a ride home and takes pt to MD appts.  30D readmission.  No HH, DME, HD.  Pt takes coumadin, this is monitored at [facility].  Pt indep with ADL's.  Pharmacy mail order or [facility].  CM explained Obs status.  Pt/CG v/u.    ADDIS SchaeferN, BS, RN, CCM

## 2023-10-09 NOTE — PLAN OF CARE
[facility] - Int[facility] ([facility])  Discharge Final Note    Primary Care Provider: Devon Langston Jr., MD    Expected Discharge Date: 10/9/2023    Final Discharge Note (most recent)       Final Note - 10/09/23 1529          Final Note    Assessment Type Final Discharge Note (P)      Anticipated Discharge Disposition Home or Self Care (P)         Post-Acute Status    Discharge Delays None known at this time (P)                      Important Message from Medicare             Contact Info       Jono Russell MD   Specialty: Otolaryngology   Relationship: Consulting Physician    [address]  [facility] [address]   Phone: [phone]       Next Steps: Go on 10/11/2023    Instructions: For nasal packing removal        Pt d/c'd to home.    ADDIS SchaeferN, BS, RN, CCM

## 2023-10-09 NOTE — PLAN OF CARE
Adequate for discharge.     Problem: Adult Inpatient Plan of Care  Goal: Plan of Care Review  Outcome: Met  Goal: Patient-Specific Goal (Individualized)  Outcome: Met  Goal: Absence of Hospital-Acquired Illness or Injury  Outcome: Met  Goal: Optimal Comfort and Wellbeing  Outcome: Met  Goal: Readiness for Transition of Care  Outcome: Met     Problem: Diabetes Comorbidity  Goal: Blood Glucose Level Within Targeted Range  Outcome: Met     Problem: Fall Injury Risk  Goal: Absence of Fall and Fall-Related Injury  Outcome: Met     Problem: Anemia  Goal: Anemia Symptom Improvement  Outcome: Met

## 2023-10-09 NOTE — PROGRESS NOTES
[facility] ([place] [address])  Otorhinolaryngology-Head & Neck Surgery  Progress Note    Subjective:     Post-Op Info:  * No surgery found *      Hospital Day: 2     Interval History: NAEON. No bleeding from left naris or mouth.     Medications:  Continuous Infusions:  Scheduled Meds:   allopurinoL  100 mg Oral Daily    amoxicillin-clavulanate 875-125mg  1 tablet Oral Q12H    atorvastatin  40 mg Oral Daily    bumetanide  1 mg Oral Every other day    ferrous gluconate  324 mg Oral Daily with breakfast    isosorbide mononitrate  60 mg Oral QHS    metoprolol succinate  25 mg Oral Daily    polyethylene glycol  17 g Oral Daily    warfarin  5 mg Oral Once per day on Sun Wed Thu Fri    warfarin  5 mg Oral Every Mon    [START ON 10/10/2023] warfarin  7.5 mg Oral Once per day on Tue Sat     PRN Meds:0.9%  NaCl infusion (for blood administration), acetaminophen, albuterol, dextrose 10%, dextrose 10%, glucagon (human recombinant), glucose, glucose, hydrALAZINE, loperamide, naloxone, ondansetron, sodium chloride 0.9%     Review of patient's allergies indicates:   Allergen Reactions    Lisinopril     Losartan      Intolerance- elevates potassium level     Objective:     Vital Signs (24h Range):  Temp:  [97.5 °F (36.4 °C)-98.6 °F (37 °C)] 97.5 °F (36.4 °C)  Pulse:  [] 67  Resp:  [16-19] 18  SpO2:  [95 %-100 %] 98 %  BP: (134-179)/(64-87) 134/65       Lines/Drains/Airways       Peripheral Intravenous Line  Duration                  Peripheral IV - Single Lumen 10/08/23 0959 20 G Left Forearm <1 day                     Physical Exam  Resting comfortably on hospital bed, in no acute distress  Right naris with no active bleeding  Left naris packed with inflatable RapidRhino, hemostatic  Oropharynx with no blood    Significant Labs:  BMP:   Recent Labs   Lab 10/09/23  0456   *   *   CO2 19*   BUN 54*   CREATININE 1.8*   CALCIUM 9.9     CBC:   Recent Labs   Lab 10/09/23  0456 10/09/23  0754    WBC 6.83  --    RBC 2.88*  --    HGB 8.7* 9.2*   HCT 26.8* 29.3*   *  --    MCV 93  --    MCH 30.2  --    MCHC 32.5  --      Significant Diagnostics:  None    Assessment/Plan:     * Epistaxis, recurrent  83M with pAF, mechanical aortic valve (on warfarin), and recurrent epistaxis s/p endoscopic SPA ligation in June 2023 and more recently IR left SPA embolization in October 2023. Rapid Rhino placed by ED on 10/8. Currently hemostatic.     -- Okay to continue anticoagulation medication  -- Continue sodium chloride nasal spray bilaterally Q4H while awake  -- Continue oxymetazoline nasal spray on the left, BID for 3 days  -- Patient will need antistaphylococcal coverage while nasal packing is in place  -- Okay to discharge per ENT; will set up the patient to see Dr. Russell in clinic this Wednesday for packing removal  -- Please Secure Chat me for any questions, page ENT on-call if unresponsive or urgent        Tank Gomez MD  Otorhinolaryngology-Head & Neck Surgery  [facility] ([place])

## 2023-10-09 NOTE — DISCHARGE SUMMARY
[facility] ([place] [address])  [facility] Medicine  Discharge Summary      Patient Name: Dariel Urbina  MRN: [id number]  EMELIA: [id number]  Patient Class: OP- Observation  Admission Date: 10/8/2023  Hospital Length of Stay: 0 days  Discharge Date and Time:  10/09/2023 12:52 PM  Attending Physician: Mike Costello DO   Discharging Provider: Mike Costello DO  Primary Care Provider: Devon Langston Jr., MD  [facility] Medicine Team: [facility] [place] [facility] MED Q Mike Costello DO  Primary Care Team: [facility] [place] [facility]OS[facility] MED Q    HPI:   Mr. Urbina is an 83-year-old male who presented for recurrent epistaxis, last episode was 10/05 and had an elective sphenopalatine artery embolization.  He has a PMH of mechanical AV replacement on coumadin, pAfib, CKD-III, anemia of CKD, GI bleed, DM 2.  Patient reports that the bleeding episode started today morning. He tried Afrin without relief. Associated with hemoptysis/spitting up blood, which patient attributes to every episode of epistaxis that he had before.  He denies any hemoptysis prior to his nosebleed.  Reports that he has not had his morning medicine yet.  He denies chest pain, abdominal pain, dark tarry stool, hematuria.  He denies any complaints other than his epistaxis.  ED provider inserted rhino pack, left nostril.      * No surgery found *      Hospital Course:   Mr. Vieira presented with epistaxis, left side with acute blood loss.  He is on Coumadin, with therapeutic INR of 2.8 on admission.  Rhino pack was inserted into the left nostril.  Bleeding abated.  ENT recommended patient be started on Augmentin until the scene by outpatient on Wednesday for possible removal of rhino pack.  Patient reports that he was in pain, refused Tylenol initially.  He reports that he does not want any pain medication.  Educated patient on the use of Tylenol.  One dose of Tylenol was given and patient's blood pressure decreased.  Patient to be discharged.  He reported that he has Tylenol at  home and he plans to take it.  Educated patient on the maximum allowed dose.  Also advised patient to read the label on the bottle.  Patient reached maximum benefit from hospital stay.  ENT reports the patient is scheduled for his visit on Wednesday, however patient must await a call to set up the timing.       Physical Exam  Constitutional:       General: He is not in acute distress.     Appearance: Normal appearance. He is not ill-appearing.   HENT:      Head: Normocephalic and atraumatic.      Nose:      Comments: Rhino pack in the left nostril  Eyes:      Extraocular Movements: Extraocular movements intact.      Conjunctiva/sclera: Conjunctivae normal.   Cardiovascular:      Rate and Rhythm: Normal rate. Rhythm irregular.      Pulses: Normal pulses.      Heart sounds:      No gallop.   Pulmonary:      Effort: Pulmonary effort is normal.      Breath sounds: Normal breath sounds. No wheezing or rales.   Abdominal:      General: Abdomen is flat.      Palpations: Abdomen is soft.   Musculoskeletal:      Right lower leg: No edema.      Left lower leg: No edema.   Skin:     Coloration: Skin is not jaundiced.   Neurological:      General: No focal deficit present.      Mental Status: He is alert and oriented to person, place, and time.   Psychiatric:         Mood and Affect: Mood normal.         Behavior: Behavior normal.           Goals of Care Treatment Preferences:  Code Status: Full Code      Consults: ENT    ENT  Epistaxis  Patient to follow-up with ENT outpatient planned for Wednesday.  Continue Augmentin until seen by ENT for further recommendations.  Appreciate ENT assistance.        Final Active Diagnoses:    Diagnosis Date Noted POA    Epistaxis [R04.0] 03/21/2018 Unknown      Problems Resolved During this Admission:    Diagnosis Date Noted Date Resolved POA    PRINCIPAL PROBLEM:  Epistaxis, recurrent [R04.0] 12/21/2022 10/09/2023 Unknown    Acute blood loss anemia [D62] 12/21/2022 10/09/2023 Unknown     H/O mechanical aortic valve replacement [Z95.2] 09/17/2014 10/09/2023 Not Applicable    Anticoagulant long-term use [Z79.01] 09/26/2012 10/09/2023 Not Applicable       Discharged Condition: stable    Disposition: Home or Self Care    Follow Up:   Follow-up Information     Jono Russell MD. Go on 10/11/2023.    Specialty: Otolaryngology  Why: For nasal packing removal  Contact information:  [address]  [facility] [address]  [phone]                       Patient Instructions:      Notify your health care provider if you experience any of the following:  temperature >100.4     Notify your health care provider if you experience any of the following:  severe uncontrolled pain     Notify your health care provider if you experience any of the following:  persistent nausea and vomiting or diarrhea     Notify your health care provider if you experience any of the following:  redness, tenderness, or signs of infection (pain, swelling, redness, odor or green/yellow discharge around incision site)     Notify your health care provider if you experience any of the following:     Notify your health care provider if you experience any of the following:  increased confusion or weakness     Notify your health care provider if you experience any of the following:  persistent dizziness, light-headedness, or visual disturbances     Notify your health care provider if you experience any of the following:  worsening rash     Notify your health care provider if you experience any of the following:  difficulty breathing or increased cough     Notify your health care provider if you experience any of the following:  severe persistent headache     Activity as tolerated       Significant Diagnostic Studies: Labs: All labs within the past 24 hours have been reviewed    Pending Diagnostic Studies:     Procedure Component Value Units Date/Time    Hematocrit [[id number]]     Order Status: Sent Lab Status: No result     Specimen:  Blood     Hemoglobin [[id number]]     Order Status: Sent Lab Status: No result     Specimen: Blood          Medications:  Reconciled Home Medications:      Medication List      START taking these medications    amoxicillin-clavulanate 875-125mg 875-125 mg per tablet  Commonly known as: AUGMENTIN  Take 1 tablet by mouth every 12 (twelve) hours.        CONTINUE taking these medications    acetaminophen 500 MG tablet  Commonly known as: TYLENOL  Take 500 mg by mouth daily as needed for Pain.     allopurinoL 100 MG tablet  Commonly known as: ZYLOPRIM  Take 1 tablet (100 mg total) by mouth once daily.     bumetanide 1 MG tablet  Commonly known as: BUMEX  Take 1 tablet (1 mg total) by mouth every other day.     diphenhydrAMINE 25 mg capsule  Commonly known as: BENADRYL  Take 50 mg by mouth nightly as needed for Insomnia.     ferrous gluconate 324 MG tablet  Commonly known as: FERGON  Take 1 tablet (324 mg total) by mouth daily with breakfast.     fish oil-omega-3 fatty acids 300-1,000 mg capsule  Take 1 capsule by mouth once daily.     isosorbide mononitrate 60 MG 24 hr tablet  Commonly known as: IMDUR  Take 1 tablet (60 mg total) by mouth every evening.     loperamide 2 mg capsule  Commonly known as: IMODIUM  Take 2 mg by mouth 4 (four) times daily as needed for Diarrhea.     metoprolol succinate 25 MG 24 hr tablet  Commonly known as: TOPROL-XL  Take 1 tablet (25 mg total) by mouth once daily.     NASAL DECONGESTANT (OXYMETAZL) 0.05 % nasal spray  Generic drug: oxymetazoline  Use 2 sprays by Nasal route daily as needed (nose bleed).     rosuvastatin 40 MG Tab  Commonly known as: CRESTOR  TAKE 1 TABLET EVERY EVENING.     warfarin 5 MG tablet  Commonly known as: COUMADIN  Take warfarin 7.5mg (1.5 tablets) PO Tuesday & Saturday, then take 5mg PO all other days or as instructed by Coumadin Clinic            Indwelling Lines/Drains at time of discharge:   Lines/Drains/Airways     None                 Time spent on the  discharge of patient: 30 minutes         Mike Costello DO  Department of Hospital Medicine  [facility] ([place]

## 2023-10-09 NOTE — NURSING
..Nurses Note -- 4 Eyes      10/9/2023   5:28 AM      Skin assessed during: Admit      [x] No Altered Skin Integrity Present    []Prevention Measures Documented      [] Yes- Altered Skin Integrity Present or Discovered   [] LDA Added if Not in Epic (Describe Wound)   [] New Altered Skin Integrity was Present on Admit and Documented in LDA   [] Wound Image Taken    Wound Care Consulted? No    Attending Nurse:  Melissa Vázquez RN/Staff Member:   Nithya

## 2023-10-09 NOTE — ASSESSMENT & PLAN NOTE
83M with pAF, mechanical aortic valve (on warfarin), and recurrent epistaxis s/p endoscopic SPA ligation in June 2023 and more recently IR left SPA embolization in October 2023. Rapid Rhino placed by ED on 10/8. Currently hemostatic.     -- Okay to continue anticoagulation medication  -- Continue sodium chloride nasal spray bilaterally Q4H while awake  -- Continue oxymetazoline nasal spray on the left, BID for 3 days  -- Patient will need antistaphylococcal coverage while nasal packing is in place  -- Okay to discharge per ENT; will set up the patient to see Dr. Russell in clinic this Wednesday for packing removal  -- Please Secure Chat me for any questions, page ENT on-call if unresponsive or urgent

## 2023-10-09 NOTE — HOSPITAL COURSE
Mr. Vieira presented with epistaxis, left side with acute blood loss.  He is on Coumadin, with therapeutic INR of 2.8 on admission.  Rhino pack was inserted into the left nostril.  Bleeding abated.  ENT recommended patient be started on Augmentin until the scene by outpatient on Wednesday for possible removal of rhino pack.  Patient reports that he was in pain, refused Tylenol initially.  He reports that he does not want any pain medication.  Educated patient on the use of Tylenol.  One dose of Tylenol was given and patient's blood pressure decreased.  Patient to be discharged.  He reported that he has Tylenol at home and he plans to take it.  Educated patient on the maximum allowed dose.  Also advised patient to read the label on the bottle.  Patient reached maximum benefit from hospital stay.  ENT reports the patient is scheduled for his visit on Wednesday, however patient must await a call to set up the timing.

## 2023-10-09 NOTE — NURSING
AVS and discharge instructions given. All questions answered. IV removed. Patient's wife stated they will  abx one their way out. Wheelchair transport requested.

## 2023-10-10 ENCOUNTER — PATIENT OUTREACH (OUTPATIENT)
Dept: ADMINISTRATIVE | Facility: CLINIC | Age: 83
End: 2023-10-10
Payer: MEDICARE

## 2023-10-11 ENCOUNTER — LAB VISIT (OUTPATIENT)
Dept: LAB | Facility: HOSPITAL | Age: 83
End: 2023-10-11
Attending: INTERNAL MEDICINE
Payer: MEDICARE

## 2023-10-11 ENCOUNTER — ANTI-COAG VISIT (OUTPATIENT)
Dept: CARDIOLOGY | Facility: CLINIC | Age: 83
End: 2023-10-11
Payer: MEDICARE

## 2023-10-11 ENCOUNTER — OFFICE VISIT (OUTPATIENT)
Dept: OTOLARYNGOLOGY | Facility: CLINIC | Age: 83
End: 2023-10-11
Payer: MEDICARE

## 2023-10-11 DIAGNOSIS — N18.32 STAGE 3B CHRONIC KIDNEY DISEASE: ICD-10-CM

## 2023-10-11 DIAGNOSIS — Z79.01 ANTICOAGULANT LONG-TERM USE: ICD-10-CM

## 2023-10-11 DIAGNOSIS — R04.0 RECURRENT EPISTAXIS: Primary | ICD-10-CM

## 2023-10-11 DIAGNOSIS — Z95.2 H/O MECHANICAL AORTIC VALVE REPLACEMENT: ICD-10-CM

## 2023-10-11 LAB
INR PPP: 3 (ref 0.8–1.2)
PROTHROMBIN TIME: 30.3 SEC (ref 9–12.5)

## 2023-10-11 PROCEDURE — 85610 PROTHROMBIN TIME: CPT | Mod: HCNC | Performed by: INTERNAL MEDICINE

## 2023-10-11 PROCEDURE — 3072F LOW RISK FOR RETINOPATHY: CPT | Mod: HCNC,CPTII,S$GLB, | Performed by: STUDENT IN AN ORGANIZED HEALTH CARE EDUCATION/TRAINING PROGRAM

## 2023-10-11 PROCEDURE — 1111F PR DISCHARGE MEDS RECONCILED W/ CURRENT OUTPATIENT MED LIST: ICD-10-PCS | Mod: HCNC,CPTII,S$GLB, | Performed by: STUDENT IN AN ORGANIZED HEALTH CARE EDUCATION/TRAINING PROGRAM

## 2023-10-11 PROCEDURE — 99999 PR PBB SHADOW E&M-EST. PATIENT-LVL II: ICD-10-PCS | Mod: PBBFAC,HCNC,, | Performed by: STUDENT IN AN ORGANIZED HEALTH CARE EDUCATION/TRAINING PROGRAM

## 2023-10-11 PROCEDURE — 93793 ANTICOAG MGMT PT WARFARIN: CPT | Mod: S$GLB,,,

## 2023-10-11 PROCEDURE — 99999 PR PBB SHADOW E&M-EST. PATIENT-LVL II: CPT | Mod: PBBFAC,HCNC,, | Performed by: STUDENT IN AN ORGANIZED HEALTH CARE EDUCATION/TRAINING PROGRAM

## 2023-10-11 PROCEDURE — 3072F PR LOW RISK FOR RETINOPATHY: ICD-10-PCS | Mod: HCNC,CPTII,S$GLB, | Performed by: STUDENT IN AN ORGANIZED HEALTH CARE EDUCATION/TRAINING PROGRAM

## 2023-10-11 PROCEDURE — 99213 OFFICE O/P EST LOW 20 MIN: CPT | Mod: 25,HCNC,S$GLB, | Performed by: STUDENT IN AN ORGANIZED HEALTH CARE EDUCATION/TRAINING PROGRAM

## 2023-10-11 PROCEDURE — 31231 NASAL ENDOSCOPY DX: CPT | Mod: HCNC,S$GLB,, | Performed by: STUDENT IN AN ORGANIZED HEALTH CARE EDUCATION/TRAINING PROGRAM

## 2023-10-11 PROCEDURE — 99213 PR OFFICE/OUTPT VISIT, EST, LEVL III, 20-29 MIN: ICD-10-PCS | Mod: 25,HCNC,S$GLB, | Performed by: STUDENT IN AN ORGANIZED HEALTH CARE EDUCATION/TRAINING PROGRAM

## 2023-10-11 PROCEDURE — 31231 PR NASAL ENDOSCOPY, DX: ICD-10-PCS | Mod: HCNC,S$GLB,, | Performed by: STUDENT IN AN ORGANIZED HEALTH CARE EDUCATION/TRAINING PROGRAM

## 2023-10-11 PROCEDURE — 36415 COLL VENOUS BLD VENIPUNCTURE: CPT | Mod: HCNC | Performed by: INTERNAL MEDICINE

## 2023-10-11 PROCEDURE — 93793 PR ANTICOAGULANT MGMT FOR PT TAKING WARFARIN: ICD-10-PCS | Mod: S$GLB,,,

## 2023-10-11 PROCEDURE — 1111F DSCHRG MED/CURRENT MED MERGE: CPT | Mod: HCNC,CPTII,S$GLB, | Performed by: STUDENT IN AN ORGANIZED HEALTH CARE EDUCATION/TRAINING PROGRAM

## 2023-10-12 NOTE — PHYSICIAN QUERY
PT Name: Dariel Urbina  MR #: [id number]    DOCUMENTATION CLARIFICATION     CDS/: Fatou Pedroza RN, CDS               Contact information: [email or web address]     This form is a permanent document in the medical record.     Query Date: October 12, 2023    By submitting this query, we are merely seeking further clarification of documentation. Please utilize your independent clinical judgment when addressing the question(s) below.    Supporting Clinical Findings Location in Medical Record     -- He noted to continue to have epistaxis, mostly left-sided.  He previously underwent sphenopalatine artery ligation on 06/14/2023.  Initially bleeding was controlled, but now continues to have more frequent bleeding.    --He continues to be on Coumadin.  --warfarin (COUMADIN) 7.5mg PO on Sun and Thursday, 5mg other days    --Preoperative Diagnoses:   Left nose epistaxis  --Procedure:   1-vessel cerebral angiogram with sphenopalatine embolization    --INR 2.2, consulted with Pharmacy, instructed to continue Coumadin at dose of 5 mg daily and follow up in Coumadin clinic on 10/9.   NeuroSx H&P 10/5 (Dr Cruz/Anna PA)              Op-Note 10/5 (dr cruz)          Discharge Summary 10/6 (Dr Cruz/Anna PA)      10/06/23 08:43   Platelet Count 170   Protime 22.4 (H)   INR 2.2 (H)      Labs 10/6       Provider, please clarify if there is any clinical correlation between ___Left nose epistaxis__ and __Coumadin__.           Are the conditions:      [  ] Due to or associated with each other   [  ] Unrelated to each other   [  ] Other explanation (Please Specify): ______________   [ x ] Clinically Undetermined                                                                               Please document in your progress notes daily for the duration of treatment until resolved and include in your discharge summary.

## 2023-10-12 NOTE — PROGRESS NOTES
Subjective:      Dariel is a 83 y.o. male who comes for follow-up of  epistaxis .  His last visit with me was on 10/2/2023.  He recently underwent SPA embolization on 10/5/23. Was seen in the ED 10/8/23 for continued left sided bleeding. RR was placed in the ED and he was admitted. He follows up today for RR removal. Has been hemostatic since discharge.     His current sinus regime consists of: Nasal saline.     The patient's medications, allergies, past medical, surgical, social and family histories were reviewed and updated as appropriate.    A detailed review of systems was obtained with pertinent positives as per the above HPI, and otherwise negative.        Objective:     There were no vitals taken for this visit.       Constitutional:   Vital signs are normal. He appears well-developed and well-nourished.     Head:  Normocephalic and atraumatic.     Ears:  Hearing normal to normal and whispered voice; external ear normal without scars, lesions, or masses; ear canal, tympanic membrane, and middle ear normal..   Right Ear: No swelling. Tympanic membrane is not perforated and not bulging. No middle ear effusion.   Left Ear: No swelling. Tympanic membrane is not perforated and not bulging.  No middle ear effusion.     Nose:  Nose normal including turbinates, nasal mucosa, sinuses and nasal septum. No epistaxis.         Mouth/Throat  Oropharynx clear and moist without lesions or asymmetry and normal uvula midline. Normal dentition. No tonsillar abscesses. Tonsillar exudate.      Neck:  Neck normal without thyromegaly masses, asymmetry, normal tracheal structure, crepitus, and tenderness, thyroid normal, trachea normal, phonation normal, full range of motion with neck supple and no adenopathy. No stridor present.        Head (right side): No submental adenopathy present.        Head (left side): No submental adenopathy present.     He has no cervical adenopathy.     Pulmonary/Chest:   No stridor.  "      Procedure    Nasal endoscopy performed.  See procedure note.    Nasal Endoscopy:  10/11/2023    The use of diagnostic nasal endoscopy was considered medically necessary for the evaluation and visualization of the nasal anatomy for symptoms suggestive of nasal or sinus origin. Physical examination (including a nasal speculum evaluation) did not provide sufficient clinical information to establish a diagnosis, or symptoms did not improve or worsened following treatment.     The nasal cavity was decongested with topical 1% phenylephrine and anesthetized with 4% lidocaine.  A rigid 0-degree endoscope was introduced into the nasal cavity.    The patient was seated in the examination chair. After discussion of risks and benefits, a nasal endoscope was inserted into the nose the endoscope was passed along the left nasal floor to the nasopharynx. It was then passed between the middle and superior meatus, nasal turbinates, nasal septum, nasopharynx and sphenoethmoid region. The nasal endoscope was withdrawn and there was no complications. An identical procedure was performed on the right side. I was present for the entire procedure.The patient tolerated the above procedure well. The findings of this procedure can be found in the dictated note from 10/11/2023 visit.      Rhinorocket removed. Some scant bleeding on the left superior septum. Fibrillar placed. Hemostasis achieved.     Data Reviewed    WBC (K/uL)   Date Value   10/09/2023 6.83     Eosinophil % (%)   Date Value   10/08/2023 1.6     Eos # (K/uL)   Date Value   10/08/2023 0.1     Platelets (K/uL)   Date Value   10/09/2023 149 (L)     Glucose (mg/dL)   Date Value   10/09/2023 114 (H)     No results found for: "IGE"    No sinus imaging available.      Assessment:     No diagnosis found.     Plan:     - RTC 1 month for check  - COnt aggressive nasal saline  - Afrin PRN for bleeding    Jono Russell MD       "

## 2023-10-14 ENCOUNTER — TELEPHONE (OUTPATIENT)
Dept: ENDOSCOPY | Facility: HOSPITAL | Age: 83
End: 2023-10-14
Payer: MEDICARE

## 2023-10-14 NOTE — TELEPHONE ENCOUNTER
Contacted the patient to schedule an endoscopy procedure(s) 10/14/2023. The patient did not answer the call and left a voice message requesting a call back.

## 2023-10-14 NOTE — TELEPHONE ENCOUNTER
"----- Message from Beti Cevallos sent at 2023 10:06 AM CDT -----    ----- Message -----  From: Sergei Ayon MD  Sent: 2023   5:25 PM CDT  To: [facility]    Procedure: EGD/Colonoscopy    Diagnosis: Iron deficiency anemia, history of colon adenomas polyp    Procedure Timin-6 weeks    #If within 4 weeks selected, please sherrell as high priority#    #If greater than 12 weeks, please select "5-12 weeks" and delay sending until 2 months prior to requested date#     Provider: Any endoscopist    Location: [address]    Additional Scheduling Information: Blood thinners    Prep Specifications:Standard prep    Is the patient taking a GLP-1 Agonist:no    Have you attached a patient to this message: yes       "

## 2023-10-18 ENCOUNTER — ANTI-COAG VISIT (OUTPATIENT)
Dept: CARDIOLOGY | Facility: CLINIC | Age: 83
End: 2023-10-18
Payer: MEDICARE

## 2023-10-18 ENCOUNTER — LAB VISIT (OUTPATIENT)
Dept: LAB | Facility: HOSPITAL | Age: 83
End: 2023-10-18
Attending: INTERNAL MEDICINE
Payer: MEDICARE

## 2023-10-18 DIAGNOSIS — Z95.2 H/O MECHANICAL AORTIC VALVE REPLACEMENT: ICD-10-CM

## 2023-10-18 DIAGNOSIS — Z79.01 ANTICOAGULANT LONG-TERM USE: ICD-10-CM

## 2023-10-18 LAB
INR PPP: 2.6 (ref 0.8–1.2)
PROTHROMBIN TIME: 26.7 SEC (ref 9–12.5)

## 2023-10-18 PROCEDURE — 36415 COLL VENOUS BLD VENIPUNCTURE: CPT | Mod: HCNC | Performed by: INTERNAL MEDICINE

## 2023-10-18 PROCEDURE — 85610 PROTHROMBIN TIME: CPT | Mod: HCNC | Performed by: INTERNAL MEDICINE

## 2023-10-18 PROCEDURE — 93793 ANTICOAG MGMT PT WARFARIN: CPT | Mod: S$GLB,,,

## 2023-10-18 PROCEDURE — 93793 PR ANTICOAGULANT MGMT FOR PT TAKING WARFARIN: ICD-10-PCS | Mod: S$GLB,,,

## 2023-10-22 PROBLEM — R04.0 EPISTAXIS: Status: RESOLVED | Noted: 2018-03-21 | Resolved: 2023-10-22

## 2023-10-29 ENCOUNTER — HOSPITAL ENCOUNTER (EMERGENCY)
Facility: HOSPITAL | Age: 83
Discharge: HOME | End: 2023-10-29
Attending: EMERGENCY MEDICINE
Payer: MEDICARE

## 2023-10-29 VITALS
SYSTOLIC BLOOD PRESSURE: 150 MMHG | WEIGHT: 172 LBS | OXYGEN SATURATION: 97 % | RESPIRATION RATE: 20 BRPM | DIASTOLIC BLOOD PRESSURE: 58 MMHG | HEART RATE: 73 BPM | BODY MASS INDEX: 28.62 KG/M2 | TEMPERATURE: 99 F

## 2023-10-29 DIAGNOSIS — R04.0 LEFT-SIDED EPISTAXIS: Primary | ICD-10-CM

## 2023-10-29 DIAGNOSIS — R04.0 EPISTAXIS: ICD-10-CM

## 2023-10-29 DIAGNOSIS — Z79.01 LONG TERM CURRENT USE OF ANTICOAGULANT: ICD-10-CM

## 2023-10-29 LAB
ALBUMIN SERPL-MCNC: 3.3 G/DL (ref 3.4–4.8)
ALBUMIN/GLOB SERPL: 1 RATIO (ref 1.1–2)
ALP SERPL-CCNC: 60 UNIT/L (ref 40–150)
ALT SERPL-CCNC: 16 UNIT/L (ref 0–55)
APTT PPP: 35.9 SECONDS (ref 23.2–33.7)
AST SERPL-CCNC: 17 UNIT/L (ref 5–34)
BASOPHILS # BLD AUTO: 0.04 X10(3)/MCL
BASOPHILS NFR BLD AUTO: 0.8 %
BILIRUB SERPL-MCNC: 0.5 MG/DL
BUN SERPL-MCNC: 52.2 MG/DL (ref 8.4–25.7)
CALCIUM SERPL-MCNC: 9.4 MG/DL (ref 8.8–10)
CHLORIDE SERPL-SCNC: 108 MMOL/L (ref 98–107)
CO2 SERPL-SCNC: 22 MMOL/L (ref 23–31)
CREAT SERPL-MCNC: 1.9 MG/DL (ref 0.73–1.18)
EOSINOPHIL # BLD AUTO: 0.13 X10(3)/MCL (ref 0–0.9)
EOSINOPHIL NFR BLD AUTO: 2.5 %
ERYTHROCYTE [DISTWIDTH] IN BLOOD BY AUTOMATED COUNT: 15.2 % (ref 11.5–17)
GFR SERPLBLD CREATININE-BSD FMLA CKD-EPI: 35 MLS/MIN/1.73/M2
GLOBULIN SER-MCNC: 3.4 GM/DL (ref 2.4–3.5)
GLUCOSE SERPL-MCNC: 130 MG/DL (ref 82–115)
HCT VFR BLD AUTO: 26.1 % (ref 42–52)
HGB BLD-MCNC: 8.3 G/DL (ref 14–18)
IMM GRANULOCYTES # BLD AUTO: 0.02 X10(3)/MCL (ref 0–0.04)
IMM GRANULOCYTES NFR BLD AUTO: 0.4 %
INR PPP: 2.5
LYMPHOCYTES # BLD AUTO: 1 X10(3)/MCL (ref 0.6–4.6)
LYMPHOCYTES NFR BLD AUTO: 19.5 %
MCH RBC QN AUTO: 30.6 PG (ref 27–31)
MCHC RBC AUTO-ENTMCNC: 31.8 G/DL (ref 33–36)
MCV RBC AUTO: 96.3 FL (ref 80–94)
MONOCYTES # BLD AUTO: 0.45 X10(3)/MCL (ref 0.1–1.3)
MONOCYTES NFR BLD AUTO: 8.8 %
NEUTROPHILS # BLD AUTO: 3.5 X10(3)/MCL (ref 2.1–9.2)
NEUTROPHILS NFR BLD AUTO: 68 %
NRBC BLD AUTO-RTO: 0 %
PLATELET # BLD AUTO: 158 X10(3)/MCL (ref 130–400)
PMV BLD AUTO: 10.3 FL (ref 7.4–10.4)
POTASSIUM SERPL-SCNC: 4.8 MMOL/L (ref 3.5–5.1)
PROT SERPL-MCNC: 6.7 GM/DL (ref 5.8–7.6)
PROTHROMBIN TIME: 26.6 SECONDS (ref 12.5–14.5)
RBC # BLD AUTO: 2.71 X10(6)/MCL (ref 4.7–6.1)
SODIUM SERPL-SCNC: 139 MMOL/L (ref 136–145)
WBC # SPEC AUTO: 5.14 X10(3)/MCL (ref 4.5–11.5)

## 2023-10-29 PROCEDURE — 85025 COMPLETE CBC W/AUTO DIFF WBC: CPT | Performed by: NURSE PRACTITIONER

## 2023-10-29 PROCEDURE — 30905 CONTROL OF NOSEBLEED: CPT | Mod: LT

## 2023-10-29 PROCEDURE — 85610 PROTHROMBIN TIME: CPT | Performed by: NURSE PRACTITIONER

## 2023-10-29 PROCEDURE — 25000003 PHARM REV CODE 250: Performed by: EMERGENCY MEDICINE

## 2023-10-29 PROCEDURE — 30903 CONTROL OF NOSEBLEED: CPT | Mod: 59,LT

## 2023-10-29 PROCEDURE — 80053 COMPREHEN METABOLIC PANEL: CPT | Performed by: NURSE PRACTITIONER

## 2023-10-29 PROCEDURE — 99283 EMERGENCY DEPT VISIT LOW MDM: CPT

## 2023-10-29 PROCEDURE — 85730 THROMBOPLASTIN TIME PARTIAL: CPT | Performed by: NURSE PRACTITIONER

## 2023-10-29 RX ORDER — LIDOCAINE HCL/EPINEPHRINE/PF 2%-1:200K
5 VIAL (ML) INJECTION ONCE
Status: COMPLETED | OUTPATIENT
Start: 2023-10-29 | End: 2023-10-29

## 2023-10-29 RX ORDER — ISOSORBIDE MONONITRATE 60 MG/1
60 TABLET, EXTENDED RELEASE ORAL
Status: COMPLETED | OUTPATIENT
Start: 2023-10-29 | End: 2023-10-29

## 2023-10-29 RX ORDER — OXYMETAZOLINE HCL 0.05 %
2 SPRAY, NON-AEROSOL (ML) NASAL
Status: COMPLETED | OUTPATIENT
Start: 2023-10-29 | End: 2023-10-29

## 2023-10-29 RX ORDER — ALPRAZOLAM 0.25 MG/1
0.25 TABLET ORAL
Status: COMPLETED | OUTPATIENT
Start: 2023-10-29 | End: 2023-10-29

## 2023-10-29 RX ADMIN — ALPRAZOLAM 0.25 MG: 0.25 TABLET ORAL at 09:10

## 2023-10-29 RX ADMIN — Medication 2 SPRAY: at 05:10

## 2023-10-29 RX ADMIN — ISOSORBIDE MONONITRATE 60 MG: 60 TABLET, EXTENDED RELEASE ORAL at 09:10

## 2023-10-29 RX ADMIN — LIDOCAINE HYDROCHLORIDE,EPINEPHRINE BITARTRATE 5 ML: 20; .005 INJECTION, SOLUTION EPIDURAL; INFILTRATION; INTRACAUDAL; PERINEURAL at 07:10

## 2023-10-29 NOTE — ED PROVIDER NOTES
Encounter Date: 10/29/2023       History     Chief Complaint   Patient presents with    Epistaxis     Patient c/o of nose bleed on going since 3am this morning. On left side. Patient currently has nose clip in place. On coumadin.     The history is provided by the patient and a relative.   Epistaxis   This is a recurrent problem. The current episode started today. The problem occurs constantly. The problem has been unchanged. The problem is associated with anticoagulants. The bleeding has been from the left nare. He has tried applying pressure and vasoconstrictors for the symptoms. The treatment provided mild relief. His past medical history is significant for frequent nosebleeds.   Patient underwent sphenopalatine artery ligation at [facility] in June 2023.  He has continued to have bouts of epistaxis and subsequently underwent IR embolization of the sphenopalatine artery earlier this month.  Patient lives in [facility] and is here visiting his daughter.  His case is complicated by his warfarin anticoagulation that is necessary due to a mechanical valve.    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    Bilateral carotid artery disease 02/09/2017    Bleeding from the nose     Bleeding nose 03/21/2018    Cancer     Cataract     CHF (congestive heart failure)     CKD (chronic kidney disease) stage 3, GFR 30-59 ml/min 05/27/2015    Claudication of left lower extremity 09/17/2014    Colon polyp     Encounter for blood transfusion     Gastroesophageal reflux disease without  esophagitis 03/19/2018    Gastrointestinal hemorrhage associated with intestinal diverticulosis 04/01/2018    Glaucoma     H/O mechanical aortic valve replacement 09/17/2014    History of gout 09/26/2012    Hyperparathyroidism due to renal insufficiency 07/27/2015    Internal hemorrhoid 04/03/2018    Long term current use of anticoagulant therapy 09/26/2012    Mechanical heart valve present     Metabolic acidosis with normal anion gap and bicarbonate losses 03/20/2018    Mixed hyperlipidemia 09/26/2012    NSTEMI (non-ST elevated myocardial infarction) 03/21/2018    Obesity, diabetes, and hypertension syndrome 02/23/2016    Paroxysmal atrial fibrillation 02/06/2023    PVD (peripheral vascular disease) 09/11/2012    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     Past Surgical History:   Procedure Laterality Date    BACK SURGERY      CARDIAC CATHETERIZATION      CARDIAC VALVE REPLACEMENT      CARDIAC VALVE SURGERY      CARPAL TUNNEL RELEASE Right 05/19/2020    Procedure: RELEASE, CARPAL TUNNEL;  Surgeon: Rupesh Norris Jr., MD;  Location: [facility];  Service: Plastics;  Laterality: Right;    CATARACT EXTRACTION Left 11/13/2022        COLON SURGERY      COLONOSCOPY N/A 03/31/2017    Procedure: COLONOSCOPY;  Surgeon: Bruno Raymond MD;  Location: [facility] (4TH FLR);  Service: Endoscopy;  Laterality: N/A;  Patient's wife requesting date.    COLONOSCOPY N/A 04/03/2018    Procedure: COLONOSCOPY;  Surgeon: Bonifacio Pelletier MD;  Location: [facility] (2ND FLR);  Service: Endoscopy;  Laterality: N/A;    COLONOSCOPY N/A 08/13/2018    Procedure: COLONOSCOPY;  Surgeon: Kam Barba MD;  Location: [facility] (2ND FLR);   Service: Endoscopy;  Laterality: N/A;  2nd floor: PA pressure 49; hx of moderate-severe valve disease     per Coumadin clinic-Patient can hold 5 days with lovenox bridge       ok to schedule per Katarina    CORONARY ANGIOGRAPHY N/A 10/04/2021    Procedure: Left heart cath +/- peripheral angiogram;  Surgeon: Jose Ruiz MD;  Location: [facility] CATH LAB;  Service: Cardiology;  Laterality: N/A;    CORONARY ANGIOGRAPHY N/A 3/2/2023    Procedure: Angiogram, Coronary;  Surgeon: Devon Garnica MD;  Location: [facility] CATH LAB;  Service: Cardiology;  Laterality: N/A;    CORONARY ANGIOPLASTY      CORONARY ARTERY BYPASS GRAFT      CORONARY BYPASS GRAFT ANGIOGRAPHY  10/04/2021    Procedure: Bypass graft study;  Surgeon: Jose Ruiz MD;  Location: [facility] CATH LAB;  Service: Cardiology;;    CORONARY BYPASS GRAFT ANGIOGRAPHY  3/2/2023    Procedure: Bypass graft study;  Surgeon: Devon Garnica MD;  Location: [facility] CATH LAB;  Service: Cardiology;;    ECHOCARDIOGRAM,TRANSESOPHAGEAL N/A 4/14/2023    Procedure: Transesophageal echo (KIRSTEN) intra-procedure log documentation;  Surgeon: [facility] Diagnostic Provider;  Location: [facility] EP LAB;  Service: Cardiology;  Laterality: N/A;    ENDOSCOPIC NASAL CAUTERIZATION Bilateral 11/3/2023    Procedure: CAUTERIZATION, NOSE, ENDOSCOPIC;  Surgeon: Jono Russell MD;  Location: [address];  Service: ENT;  Laterality: Bilateral;    EYE SURGERY      FUNCTIONAL ENDOSCOPIC SINUS SURGERY (FESS) Bilateral 6/14/2023    Procedure: FESS (FUNCTIONAL ENDOSCOPIC SINUS SURGERY);  Surgeon: Jono Russell MD;  [address];  Service: ENT;  Laterality: Bilateral;    HERNIA REPAIR      INTRAOCULAR PROSTHESES INSERTION Left 11/13/2022    Procedure: INSERTION, IOL PROSTHESIS;  Surgeon: Alia Mckeon MD;  Location: [facility] OR 1ST FLR;  Service: Ophthalmology;  Laterality: Left;    INTRAOCULAR PROSTHESES INSERTION Right 12/04/2022    Procedure: INSERTION, IOL PROSTHESIS;  Surgeon: Alia Mckeon MD;   Location: [facility] OR 1ST FLR;  Service: Ophthalmology;  Laterality: Right;    LIGATION, ARTERY, SPHENOPALATINE, ENDOSCOPIC Bilateral 6/14/2023    Procedure: LIGATION, ARTERY, SPHENOPALATINE, ENDOSCOPIC;  Surgeon: Jono Russell MD;  Location: [facility] OR 2ND FLR;  Service: ENT;  Laterality: Bilateral;    PHACOEMULSIFICATION OF CATARACT Left 11/13/2022    Procedure: PHACOEMULSIFICATION, CATARACT;  Surgeon: Alia Mckeon MD;  Location: [facility] OR 1ST FLR;  Service: Ophthalmology;  Laterality: Left;    PHACOEMULSIFICATION OF CATARACT Right 12/04/2022    Procedure: PHACOEMULSIFICATION, CATARACT;  Surgeon: Alia Mckeon MD;  Location: [facility] OR [address] [place] [address];  Service: Ophthalmology;  Laterality: Right;    SPINE SURGERY      TRANSESOPHAGEAL ECHOCARDIOGRAPHY N/A 3/22/2023    Procedure: ECHOCARDIOGRAM, TRANSESOPHAGEAL;  Surgeon: [facility] Diagnostic Provider;  Location: [facility] EP LAB;  Service: Anesthesiology;  Laterality: N/A;    TRANSESOPHAGEAL ECHOCARDIOGRAPHY N/A 4/11/2023    Procedure: ECHOCARDIOGRAM, TRANSESOPHAGEAL;  Surgeon: [facility] Diagnostic Provider;  Location: [facility] EP LAB;  Service: Anesthesiology;  Laterality: N/A;    VASECTOMY       Family History   Problem Relation Age of Onset    Heart failure Mother     Heart disease Mother     Heart failure Father     Heart disease Father     Alcohol abuse Father     Heart failure Brother     Heart disease Brother     Diabetes Brother     No Known Problems Sister     No Known Problems Maternal Grandmother     No Known Problems Maternal Grandfather     No Known Problems Paternal Grandmother     No Known Problems Paternal Grandfather     Heart disease Sister     No Known Problems Maternal Aunt     No Known Problems Maternal Uncle     No Known Problems Paternal Aunt     No Known Problems Paternal Uncle     Amblyopia Neg Hx     Blindness Neg Hx     Cancer Neg Hx     Cataracts Neg Hx     Glaucoma Neg Hx     Hypertension Neg Hx     Macular degeneration Neg Hx     Retinal detachment Neg Hx      Strabismus Neg Hx     Stroke Neg Hx     Thyroid disease Neg Hx     Anemia Neg Hx     Arrhythmia Neg Hx     Asthma Neg Hx     Clotting disorder Neg Hx     Fainting Neg Hx     Heart attack Neg Hx     Hyperlipidemia Neg Hx     Atrial Septal Defect Neg Hx     Melanoma Neg Hx      Social History     Tobacco Use    Smoking status: Former     Current packs/day: 0.00     Average packs/day: 1 pack/day for 20.0 years (20.0 ttl pk-yrs)     Types: Cigarettes     Start date: 1960     Quit date: 1980     Years since quittin.1     Passive exposure: Never    Smokeless tobacco: Never   Substance Use Topics    Alcohol use: No    Drug use: No     Review of Systems   Constitutional:  Negative for fever.   HENT:  Positive for nosebleeds. Negative for sore throat.    Respiratory:  Negative for shortness of breath.    Cardiovascular:  Negative for chest pain.   Gastrointestinal:  Negative for nausea.   Genitourinary:  Negative for dysuria.   Musculoskeletal:  Negative for back pain.   Skin:  Negative for rash.   Neurological:  Negative for weakness.   Hematological:  Does not bruise/bleed easily.       Physical Exam     Initial Vitals [10/29/23 1656]   BP Pulse Resp Temp SpO2   (!) 164/55 79 20 98.8 °F (37.1 °C) 98 %      MAP       --         Physical Exam    Nursing note and vitals reviewed.  Constitutional: He appears well-developed and well-nourished.   HENT:   Head: Normocephalic and atraumatic.   Right Ear: External ear normal.   Left Ear: External ear normal.   Nose: Epistaxis is observed.   Patient arrived with nasal clip in place and he seemed reasonably hemostatic.  Once I removed the clip to examine him and apply Afrin, it quickly began to bleed.  Clip re-applied.   Eyes: Conjunctivae and EOM are normal. Pupils are equal, round, and reactive to light.   Neck: Neck supple.   Normal range of motion.  Cardiovascular:  Normal rate, regular rhythm, normal heart sounds and intact distal pulses.            Pulmonary/Chest: Breath sounds normal.   Abdominal: Abdomen is soft. Bowel sounds are normal.   Musculoskeletal:         General: Normal range of motion.      Cervical back: Normal range of motion and neck supple.     Neurological: He is alert and oriented to person, place, and time. He has normal strength. GCS score is 15. GCS eye subscore is 4. GCS verbal subscore is 5. GCS motor subscore is 6.   Skin: Skin is warm and dry. Capillary refill takes less than 2 seconds.   Psychiatric: He has a normal mood and affect. His behavior is normal. Judgment and thought content normal.         ED Course   Procedures  Labs Reviewed   CBC WITH DIFFERENTIAL - Abnormal; Notable for the following components:       Result Value    RBC 2.71 (*)     Hgb 8.3 (*)     Hct 26.1 (*)     MCV 96.3 (*)     MCHC 31.8 (*)     All other components within normal limits   COMPREHENSIVE METABOLIC PANEL - Abnormal; Notable for the following components:    Chloride 108 (*)     Carbon Dioxide 22 (*)     Glucose Level 130 (*)     Blood Urea Nitrogen 52.2 (*)     Creatinine 1.90 (*)     Albumin Level 3.3 (*)     Albumin/Globulin Ratio 1.0 (*)     All other components within normal limits   PROTIME-INR - Abnormal; Notable for the following components:    PT 26.6 (*)     INR 2.5 (*)     All other components within normal limits   APTT - Abnormal; Notable for the following components:    PTT 35.9 (*)     All other components within normal limits          Imaging Results    None          Medications   oxymetazoline 0.05 % nasal spray 2 spray (2 sprays Each Nostril Given 10/29/23 1727)   LIDOcaine-EPINEPHrine (PF) 2%-1:200,000 injection 5 mL (5 mLs Other Given 10/29/23 1900)   Tranexamic Acid 500 mg (500 mg Topical (Top) Given 10/29/23 1830)   isosorbide mononitrate 24 hr tablet 60 mg (60 mg Oral Given 10/29/23 2125)   ALPRAZolam tablet 0.25 mg (0.25 mg Oral Given 10/29/23 2155)     Medical Decision Making  Amount and/or Complexity of Data  Reviewed  External Data Reviewed: notes.     Details: Details of SPA ligation and SPA embolization reviewed  Labs: ordered. Decision-making details documented in ED Course.    Risk  OTC drugs.  Prescription drug management.  Decision regarding hospitalization.    Differential is narrow - he has recurrent epistaxis in the setting of anticoagulation that cannot be safely reversed.  Will obtain CBC, CMP, PT/PTT and apply Afrin.  If bleeding continues, will attempt packing with Merocel.           ED Course as of 11/10/23 1932   Sun Oct 29, 2023   1947 Conservative measures (Afrin, lidocaine with epi, compression) were unsuccessful at controlling his bleeding.  I then placed an 8 cm Merocel and saturated it with 400 mg of TXA.  After a period of observation, it continues to bleed.  INR is therapeutic.  Hgb slightly lower than earlier this month and BUN elevated, likely due to swallowed blood.  Will consult ENT to evaluate. [CL]   1955 I spoke with Dr. Reynolds (ENT) - states to get ENT cart to bedside and box of Surgiflow from OR and he will come evaluate the patient. [CL]   2236 Pt packed by Dr. Reynolds in ED, rechecked with significant improvement, did give pt home dose of bp meds and something for anxiety as he was aggitated, Dr. Reynolds OK with DC home.   [NL]      ED Course User Index  [CL] Gokul Abreu MD  [NL] Kyle Bennett MD                    Clinical Impression:   Final diagnoses:  [R04.0] Left-sided epistaxis (Primary)  [Z79.01] Long term current use of anticoagulant  [R04.0] Epistaxis        ED Disposition Condition    Discharge Stable          ED Prescriptions    None       Follow-up Information       Follow up With Specialties Details Why Contact Info    Devon Langston Jr., MD Internal Medicine   [address]  [place] [address]  [phone]               Gokul Abreu MD  11/10/23 1932

## 2023-10-30 ENCOUNTER — PATIENT OUTREACH (OUTPATIENT)
Dept: EMERGENCY MEDICINE | Facility: HOSPITAL | Age: 83
End: 2023-10-30

## 2023-10-30 NOTE — TELEPHONE ENCOUNTER
Refill Decision Note   Dariel Urbina  is requesting a refill authorization.  Brief Assessment and Rationale for Refill:  Quick Discontinue     Medication Therapy Plan:    Pharmacy is requesting new scripts for the following medications without required information, (sig/ frequency/qty/etc)      Medication Reconciliation Completed: No     Comments: Pharmacies have been requesting medications for patients without required information, (sig, frequency, qty, etc.). In addition, requests are sent for medication(s) pt. are currently not taking, and medications patients have never taken.    We have spoken to the pharmacies about these request types and advised their teams previously that we are unable to assess these New Script requests and require all details for these requests. This is a known issue and has been reported.     Note composed:4:17 PM 06/26/2023                 Metronidazole Counseling:  I discussed with the patient the risks of metronidazole including but not limited to seizures, nausea/vomiting, a metallic taste in the mouth, nausea/vomiting and severe allergy.

## 2023-10-30 NOTE — CONSULTS
Chief complaint:  Recurrent epistaxis      HPI:  This is a pleasant 83-year-old gentleman with a history of recurrent epistaxis.  He is also on anticoagulation and has a long comorbid past medical history.    He has recently had both a left-sided sphenopalatine artery ligation and also an interventional embolization of the left sphenopalatine artery for recurrent epistaxis.    He is established with an ENT in [place] and follows with him closely.    While in the emergency department here, multiple rounds of Afrin and pressure were utilized, and ultimately an 8 cm Merocel sponge was placed.  This did slow down the bleeding significantly but he continues to ooze out of the front and back of the nose.  I was consulted in this regard.    Review of Systems    Past Medical History:   Diagnosis Date    Anemia of chronic renal failure, stage 3 (moderate) 05/27/2015    Anticoagulant long-term use     Atherosclerosis of coronary artery bypass graft of native heart without angina pectoris 09/11/2012    3-27-18 [facility] Two vessel coronary artery disease.   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    Bilateral carotid artery disease 02/09/2017    Bleeding from the nose     Bleeding nose 03/21/2018    Cancer     Cataract     CHF (congestive heart failure)     CKD (chronic kidney disease) stage 3, GFR 30-59 ml/min 05/27/2015    Claudication of left lower extremity 09/17/2014    Colon polyp     Encounter for blood transfusion     Gastroesophageal reflux disease without esophagitis 03/19/2018    Gastrointestinal hemorrhage associated with intestinal diverticulosis 04/01/2018    Glaucoma     H/O mechanical aortic valve replacement 09/17/2014    History of gout 09/26/2012    Hyperparathyroidism due to renal insufficiency 07/27/2015    Internal hemorrhoid 04/03/2018    Long term current use of anticoagulant therapy 09/26/2012    Mechanical heart valve present     Metabolic acidosis with normal anion gap and  bicarbonate losses 03/20/2018    Mixed hyperlipidemia 09/26/2012    NSTEMI (non-ST elevated myocardial infarction) 03/21/2018    Obesity, diabetes, and hypertension syndrome 02/23/2016    Paroxysmal atrial fibrillation 02/06/2023    PVD (peripheral vascular disease) 09/11/2012    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      Past Surgical History:   Procedure Laterality Date    BACK SURGERY      CARDIAC CATHETERIZATION      CARDIAC VALVE REPLACEMENT      CARDIAC VALVE SURGERY      CARPAL TUNNEL RELEASE Right 05/19/2020    Procedure: RELEASE, CARPAL TUNNEL;  Surgeon: Rupesh Norris Jr., MD;  Location: [facility];  Service: Plastics;  Laterality: Right;    CATARACT EXTRACTION Left 11/13/2022        COLON SURGERY      COLONOSCOPY N/A 03/31/2017    Procedure: COLONOSCOPY;  Surgeon: Bruno Raymond MD;  Location: [facility] (4TH FLR);  Service: Endoscopy;  Laterality: N/A;  Patient's wife requesting date.    COLONOSCOPY N/A 04/03/2018    Procedure: COLONOSCOPY;  Surgeon: Bonifacio Pelletier MD;  Location: [facility] (2ND FLR);  Service: Endoscopy;  Laterality: N/A;    COLONOSCOPY N/A 08/13/2018    Procedure: COLONOSCOPY;  Surgeon: Kam Barba MD;  Location: [facility] (2ND FLR);  Service: Endoscopy;  Laterality: N/A;  2nd floor: PA pressure 49; hx of moderate-severe valve disease     per Coumadin clinic-Patient can hold 5 days with lovenox bridge       ok to schedule per Katarina    CORONARY ANGIOGRAPHY N/A 10/04/2021    Procedure: Left heart cath +/- peripheral angiogram;  Surgeon: Jose Ruiz MD;  Location: [facility] CATH LAB;  Service: Cardiology;  Laterality: N/A;    CORONARY ANGIOGRAPHY N/A 3/2/2023    Procedure:  Angiogram, Coronary;  Surgeon: Devon Garnica MD;  Location: [facility] CATH LAB;  Service: Cardiology;  Laterality: N/A;    CORONARY ANGIOPLASTY      CORONARY ARTERY BYPASS GRAFT      CORONARY BYPASS GRAFT ANGIOGRAPHY  10/04/2021    Procedure: Bypass graft study;  Surgeon: Jose Ruiz MD;  Location: [facility] CATH LAB;  Service: Cardiology;;    CORONARY BYPASS GRAFT ANGIOGRAPHY  3/2/2023    Procedure: Bypass graft study;  Surgeon: Devon Garnica MD;  Location: [facility] CATH LAB;  Service: Cardiology;;    ECHOCARDIOGRAM,TRANSESOPHAGEAL N/A 4/14/2023    Procedure: Transesophageal echo (KIRSTEN) intra-procedure log documentation;  Surgeon: [facility] Diagnostic Provider;  Location: [facility] EP LAB;  Service: Cardiology;  Laterality: N/A;    EYE SURGERY      FUNCTIONAL ENDOSCOPIC SINUS SURGERY (FESS) Bilateral 6/14/2023    Procedure: FESS (FUNCTIONAL ENDOSCOPIC SINUS SURGERY);  Surgeon: Jono Russell MD;  [facility];  Service: ENT;  Laterality: Bilateral;    HERNIA REPAIR      INTRAOCULAR PROSTHESES INSERTION Left 11/13/2022    Procedure: INSERTION, IOL PROSTHESIS;  Surgeon: Alia Mckeon MD;  Location: [facility] OR [address] [place] [address];  Service: Ophthalmology;  Laterality: Left;    INTRAOCULAR PROSTHESES INSERTION Right 12/04/2022    Procedure: INSERTION, IOL PROSTHESIS;  Surgeon: Alia Mckeon MD;  [facility];  Service: Ophthalmology;  Laterality: Right;    LIGATION, ARTERY, SPHENOPALATINE, ENDOSCOPIC Bilateral 6/14/2023    Procedure: LIGATION, ARTERY, SPHENOPALATINE, ENDOSCOPIC;  Surgeon: Jono Russell MD;  Location: [facility] OR 2ND FLR;  Service: ENT;  Laterality: Bilateral;    PHACOEMULSIFICATION OF CATARACT Left 11/13/2022    Procedure: PHACOEMULSIFICATION, CATARACT;  Surgeon: Alia Mckeon MD;  Location: [facility] OR 1ST FLR;  Service: Ophthalmology;  Laterality: Left;    PHACOEMULSIFICATION OF CATARACT Right 12/04/2022    Procedure: PHACOEMULSIFICATION, CATARACT;  Surgeon: Alia Mckeon MD;  Location:  [facility] OR 1ST FLR;  Service: Ophthalmology;  Laterality: Right;    SPINE SURGERY      TRANSESOPHAGEAL ECHOCARDIOGRAPHY N/A 3/22/2023    Procedure: ECHOCARDIOGRAM, TRANSESOPHAGEAL;  Surgeon: [facility] Diagnostic Provider;  Location: [facility] EP LAB;  Service: Anesthesiology;  Laterality: N/A;    TRANSESOPHAGEAL ECHOCARDIOGRAPHY N/A 2023    Procedure: ECHOCARDIOGRAM, TRANSESOPHAGEAL;  Surgeon: [facility] Diagnostic Provider;  Location: [facility] EP LAB;  Service: Anesthesiology;  Laterality: N/A;    VASECTOMY        Social History     Socioeconomic History    Marital status:      Spouse name: Ameena    Number of children: 3   Occupational History    Occupation: retired   Tobacco Use    Smoking status: Former     Current packs/day: 0.00     Average packs/day: 1 pack/day for 20.0 years (20.0 ttl pk-yrs)     Types: Cigarettes     Start date: 1960     Quit date: 1980     Years since quittin.1     Passive exposure: Never    Smokeless tobacco: Never   Substance and Sexual Activity    Alcohol use: No    Drug use: No    Sexual activity: Not Currently     Partners: Female     Social Determinants of Health     Financial Resource Strain: Low Risk  (10/9/2023)    Overall Financial Resource Strain (CARDIA)     Difficulty of Paying Living Expenses: Not hard at all   Food Insecurity: No Food Insecurity (10/9/2023)    Hunger Vital Sign     Worried About Running Out of Food in the Last Year: Never true     Ran Out of Food in the Last Year: Never true   Transportation Needs: No Transportation Needs (10/9/2023)    PRAPARE - Transportation     Lack of Transportation (Medical): No     Lack of Transportation (Non-Medical): No   Physical Activity: Inactive (10/9/2023)    Exercise Vital Sign     Days of Exercise per Week: 0 days     Minutes of Exercise per Session: 0 min   Stress: No Stress Concern Present (10/9/2023)    [employer] - Occupational Stress Questionnaire     Feeling of Stress : Not at all    Social Connections: Moderately Isolated (10/9/2023)    Social Connection and Isolation Panel [NHANES]     Frequency of Communication with Friends and Family: More than three times a week     Frequency of Social Gatherings with Friends and Family: More than three times a week     Attends Gnosticist Services: Never     Active Member of Clubs or Organizations: No     Attends Club or Organization Meetings: Never     Marital Status:    Housing Stability: Low Risk  (10/9/2023)    Housing Stability Vital Sign     Unable to Pay for Housing in the Last Year: No     Number of Places Lived in the Last Year: 1     Unstable Housing in the Last Year: No      Family History   Problem Relation Age of Onset    Heart failure Mother     Heart disease Mother     Heart failure Father     Heart disease Father     Alcohol abuse Father     Heart failure Brother     Heart disease Brother     Diabetes Brother     No Known Problems Sister     No Known Problems Maternal Grandmother     No Known Problems Maternal Grandfather     No Known Problems Paternal Grandmother     No Known Problems Paternal Grandfather     Heart disease Sister     No Known Problems Maternal Aunt     No Known Problems Maternal Uncle     No Known Problems Paternal Aunt     No Known Problems Paternal Uncle     Amblyopia Neg Hx     Blindness Neg Hx     Cancer Neg Hx     Cataracts Neg Hx     Glaucoma Neg Hx     Hypertension Neg Hx     Macular degeneration Neg Hx     Retinal detachment Neg Hx     Strabismus Neg Hx     Stroke Neg Hx     Thyroid disease Neg Hx     Anemia Neg Hx     Arrhythmia Neg Hx     Asthma Neg Hx     Clotting disorder Neg Hx     Fainting Neg Hx     Heart attack Neg Hx     Hyperlipidemia Neg Hx     Atrial Septal Defect Neg Hx     Melanoma Neg Hx       Review of patient's allergies indicates:   Allergen Reactions    Lisinopril     Losartan      Intolerance- elevates potassium level      Prior to Admission medications    Medication Sig Start Date End  Date Taking? Authorizing Provider   acetaminophen (TYLENOL) 500 MG tablet Take 500 mg by mouth daily as needed for Pain.    Provider, Historical   allopurinoL (ZYLOPRIM) 100 MG tablet Take 1 tablet (100 mg total) by mouth once daily. 5/31/23   Devon Langston Jr., MD   amoxicillin-clavulanate 875-125mg (AUGMENTIN) 875-125 mg per tablet Take 1 tablet by mouth every 12 (twelve) hours. 10/9/23   Mike Costello,    bumetanide (BUMEX) 1 MG tablet Take 1 tablet (1 mg total) by mouth every other day. 8/14/23 8/13/24  Devon Langston Jr., MD   diphenhydrAMINE (BENADRYL) 25 mg capsule Take 50 mg by mouth nightly as needed for Insomnia.    Provider, Historical   ferrous gluconate (FERGON) 324 MG tablet Take 1 tablet (324 mg total) by mouth daily with breakfast. 9/24/23 3/22/24  Sergei Ayon MD   isosorbide mononitrate (IMDUR) 60 MG 24 hr tablet Take 1 tablet (60 mg total) by mouth every evening. 6/29/23   Devon Langston Jr., MD   loperamide (IMODIUM) 2 mg capsule Take 2 mg by mouth 4 (four) times daily as needed for Diarrhea.    Provider, Historical   metoprolol succinate (TOPROL-XL) 25 MG 24 hr tablet Take 1 tablet (25 mg total) by mouth once daily. 8/14/23   Devon Langston Jr., MD   omega-3 fatty acids/fish oil (FISH OIL-OMEGA-3 FATTY ACIDS) 300-1,000 mg capsule Take 1 capsule by mouth once daily.    Provider, Historical   oxymetazoline (AFRIN) 0.05 % nasal spray Use 2 sprays by Nasal route daily as needed (nose bleed).  Patient taking differently: 2 sprays by Nasal route every 2 (two) hours as needed (nose bleed). 12/31/22   Maeve Huang MD   rosuvastatin (CRESTOR) 40 MG Tab TAKE 1 TABLET EVERY EVENING. 11/30/22   Devon Langston Jr., MD   warfarin (COUMADIN) 5 MG tablet Take warfarin 7.5mg (1.5 tablets) PO Tuesday & Saturday, then take 5mg PO all other days or as instructed by [facility]madin Clinic 9/19/23   Devon Garnica MD            VITAL SIGNS: 24 HR MIN & MAX LAST    Temp  Min: 98.8 °F (37.1 °C)  Max:  98.8 °F (37.1 °C)  98.8 °F (37.1 °C)        BP  Min: 107/61  Max: 164/55  107/61     Pulse  Min: 79  Max: 80  80     Resp  Min: 20  Max: 20  20    SpO2  Min: 98 %  Max: 98 %  98 %      HT:    WT: 78 kg (172 lb)  BMI:       Physical Exam:  General: Alert and oriented.  No acute distress  ENT:  Ears:  Pinnae unremarkable.  No evidence of any otorrhea or middle ear effusion  Nose:  He has a left-sided Merocel sponge in place.  There is slow fresh bleeding oozing out of the front of the nose and also down the posterior pharyngeal wall.  Oral Cavity:  No mass or lesion or floor of mouth swelling  Oropharynx:  Slow oozing of blood down the posterior pharyngeal wall  Face:  Facial movement normal and symmetric  Neck:  No crepitus or tracheal deviation.  Voice is normal.  Eyes:  Extraocular movements are    Procedure:  After the Merocel was removed at left side of the nose, I suctioned fresh clot out of the entire left nasal cavity.  He had multiple spots oozing fresh blood from the insertion of the previous Merocel, coming from the left inferior turbinate, left mid septum, and head of the left middle turbinate.  These were all slow but persistent oozing.  After the clot was suctioned free, the nasal cavity was filled with Surgiflo starting posteriorly and working anteriorly filling the voids on the medial and lateral side of the middle turbinate and anterior to this and along the septum and inferior turbinate.  Once the nasal cavity was full of the Surgiflo, an Epistat balloon packing was placed and the posterior and anterior balloons were inflated to the appropriate pressure.    After this, there was no evidence of any postnasal bleeding or bleeding out of the front of the nose.      Assessment & Plan     1.  Recurrent epistaxis, with previous history of left sphenopalatine artery surgical ligation and interventional embolization    I placed some Surgiflo followed by an Epistat balloon packing.  This resolved the  bleeding.  We are going to work to get his blood pressure under control and observe him here in the emergency department to make sure that there was no further bleeding before sending him home to follow-up with his ENT in [place] for further definitive management.    I would recommend that he be sent out on antibiotic coverage similar to cefdinir all the packing is in place.  Would also recommend that his blood pressure be tightly controlled the best of his ability.    If he begins to bleed before discharge, we would admit him for observation and make further definitive plans.

## 2023-11-01 ENCOUNTER — HOSPITAL ENCOUNTER (EMERGENCY)
Facility: HOSPITAL | Age: 83
Discharge: HOME | End: 2023-11-01
Attending: STUDENT IN AN ORGANIZED HEALTH CARE EDUCATION/TRAINING PROGRAM
Payer: MEDICARE

## 2023-11-01 VITALS
WEIGHT: 172 LBS | BODY MASS INDEX: 27.64 KG/M2 | HEIGHT: 66 IN | HEART RATE: 74 BPM | SYSTOLIC BLOOD PRESSURE: 156 MMHG | TEMPERATURE: 97 F | OXYGEN SATURATION: 97 % | RESPIRATION RATE: 18 BRPM | DIASTOLIC BLOOD PRESSURE: 54 MMHG

## 2023-11-01 DIAGNOSIS — R04.0 RIGHT-SIDED EPISTAXIS: Primary | ICD-10-CM

## 2023-11-01 PROCEDURE — 99281 EMR DPT VST MAYX REQ PHY/QHP: CPT

## 2023-11-01 NOTE — ED TRIAGE NOTES
Pt reports seen here 2 nights ago for epistasis from L nare and had rhino rocket placed. Pt reports earlier this evening around 2000 started having epistasis from R nare. On coumadin. No active bleeding at this time. C/o headache at this time just above R eye

## 2023-11-01 NOTE — PROGRESS NOTES
Pt has been to ED x 2 w/in the past few days for epistaxis.  He has an upcoming appt w/ ENT.  Will f/u.  INR is WNL.

## 2023-11-01 NOTE — ED PROVIDER NOTES
Encounter Date: 11/1/2023    SCRIBE #1 NOTE: I, Tracey Sorto, am scribing for, and in the presence of,  Hunter Fitzpatrick MD. I have scribed the entire note.       History     Chief Complaint   Patient presents with    Epistaxis     Pt reports seen here 2 nights ago for epistasis from L nare and had rhino rocket placed. Pt reports earlier this evening around 2000 started having epistasis from R nare. On coumadin. No active bleeding at this time. C/o headache at this time just above R eye     83 year old male with a hx of cancer, CHF, CKD, PVD, DM, and NSTEMI presents to the ED for epistaxis. Pt reports he was seen here 2 days ago for epistaxis from the left nare. Pt had a Rhinorocket placed at that time and was scheduled to have it removed this Thursday. Tonight the pt reports that he began experiencing epistaxis from the right nare. Pt is on Coumadin for his mechanical heart valve. Pt reports he tried Afrin and the epistaxis stopped upon arrival to the ED. Pt states he is still experiencing a mild headache but has no other complaints. Pt states he wants to go home now.     The history is provided by the patient. No  was used.     Review of patient's allergies indicates:   Allergen Reactions    Lisinopril     Losartan      Intolerance- elevates potassium level     Past Medical History:   Diagnosis Date    Anemia of chronic renal failure, stage 3 (moderate) 05/27/2015    Anticoagulant long-term use     Atherosclerosis of coronary artery bypass graft of native heart without angina pectoris 09/11/2012    3-27-18 [facility] Two vessel coronary artery disease.   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    Bilateral carotid artery disease 02/09/2017    Bleeding from the nose     Bleeding nose 03/21/2018    Cancer     Cataract     CHF (congestive heart failure)     CKD (chronic kidney disease) stage 3, GFR 30-59 ml/min 05/27/2015    Claudication of left lower extremity 09/17/2014    Colon  polyp     Encounter for blood transfusion     Gastroesophageal reflux disease without esophagitis 03/19/2018    Gastrointestinal hemorrhage associated with intestinal diverticulosis 04/01/2018    Glaucoma     H/O mechanical aortic valve replacement 09/17/2014    History of gout 09/26/2012    Hyperparathyroidism due to renal insufficiency 07/27/2015    Internal hemorrhoid 04/03/2018    Long term current use of anticoagulant therapy 09/26/2012    Mechanical heart valve present     Metabolic acidosis with normal anion gap and bicarbonate losses 03/20/2018    Mixed hyperlipidemia 09/26/2012    NSTEMI (non-ST elevated myocardial infarction) 03/21/2018    Obesity, diabetes, and hypertension syndrome 02/23/2016    Paroxysmal atrial fibrillation 02/06/2023    PVD (peripheral vascular disease) 09/11/2012    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     Past Surgical History:   Procedure Laterality Date    BACK SURGERY      CARDIAC CATHETERIZATION      CARDIAC VALVE REPLACEMENT      CARDIAC VALVE SURGERY      CARPAL TUNNEL RELEASE Right 05/19/2020    Procedure: RELEASE, CARPAL TUNNEL;  Surgeon: Rupesh Norris Jr., MD;  Location: [facility];  Service: Plastics;  Laterality: Right;    CATARACT EXTRACTION Left 11/13/2022        COLON SURGERY      COLONOSCOPY N/A 03/31/2017    Procedure: COLONOSCOPY;  Surgeon: Bruno Raymond MD;  Location: [facility] (4TH FLR);  Service: Endoscopy;  Laterality: N/A;  Patient's wife requesting date.    COLONOSCOPY N/A 04/03/2018    Procedure: COLONOSCOPY;  Surgeon: Bonifacio ePlletier MD;  Location: [facility] (2ND FLR);  Service: Endoscopy;  Laterality: N/A;    COLONOSCOPY N/A 08/13/2018     Procedure: COLONOSCOPY;  Surgeon: Kam Barba MD;  Location: [facility] ENDO (2ND FLR);  Service: Endoscopy;  Laterality: N/A;  2nd floor: PA pressure 49; hx of moderate-severe valve disease     per Coumadin clinic-Patient can hold 5 days with lovenox bridge       ok to schedule per Katarina    CORONARY ANGIOGRAPHY N/A 10/04/2021    Procedure: Left heart cath +/- peripheral angiogram;  Surgeon: Jose Ruiz MD;  Location: [facility] CATH LAB;  Service: Cardiology;  Laterality: N/A;    CORONARY ANGIOGRAPHY N/A 3/2/2023    Procedure: Angiogram, Coronary;  Surgeon: Devon Garnica MD;  Location: [facility] CATH LAB;  Service: Cardiology;  Laterality: N/A;    CORONARY ANGIOPLASTY      CORONARY ARTERY BYPASS GRAFT      CORONARY BYPASS GRAFT ANGIOGRAPHY  10/04/2021    Procedure: Bypass graft study;  Surgeon: Jose Ruiz MD;  Location: [facility] CATH LAB;  Service: Cardiology;;    CORONARY BYPASS GRAFT ANGIOGRAPHY  3/2/2023    Procedure: Bypass graft study;  Surgeon: Devon Garnica MD;  Location: [facility] CATH LAB;  Service: Cardiology;;    ECHOCARDIOGRAM,TRANSESOPHAGEAL N/A 4/14/2023    Procedure: Transesophageal echo (KIRSTEN) intra-procedure log documentation;  Surgeon: [facility] Diagnostic Provider;  Location: [facility] EP LAB;  Service: Cardiology;  Laterality: N/A;    EYE SURGERY      FUNCTIONAL ENDOSCOPIC SINUS SURGERY (FESS) Bilateral 6/14/2023    Procedure: FESS (FUNCTIONAL ENDOSCOPIC SINUS SURGERY);  Surgeon: Jono Russell MD;  Location: [facility] 2ND FLR;  Service: ENT;  Laterality: Bilateral;    HERNIA REPAIR      INTRAOCULAR PROSTHESES INSERTION Left 11/13/2022    Procedure: INSERTION, IOL PROSTHESIS;  Surgeon: Alia Mckeon MD;  [facility];  Service: Ophthalmology;  Laterality: Left;    INTRAOCULAR PROSTHESES INSERTION Right 12/04/2022    Procedure: INSERTION, IOL PROSTHESIS;  Surgeon: Alia Mckeon MD;  Location: [facility] OR 1ST FLR;  Service: Ophthalmology;  Laterality: Right;    LIGATION, ARTERY, SPHENOPALATINE,  ENDOSCOPIC Bilateral 6/14/2023    Procedure: LIGATION, ARTERY, SPHENOPALATINE, ENDOSCOPIC;  Surgeon: Jono Russell MD;  Location: [facility] OR 2ND FLR;  Service: ENT;  Laterality: Bilateral;    PHACOEMULSIFICATION OF CATARACT Left 11/13/2022    Procedure: PHACOEMULSIFICATION, CATARACT;  Surgeon: Alia Mckeon MD;  Location: [facility] OR 1ST FLR;  Service: Ophthalmology;  Laterality: Left;    PHACOEMULSIFICATION OF CATARACT Right 12/04/2022    Procedure: PHACOEMULSIFICATION, CATARACT;  Surgeon: Alia Mckeon MD;  Location: [facility] OR [address] [place] [address];  Service: Ophthalmology;  Laterality: Right;    SPINE SURGERY      TRANSESOPHAGEAL ECHOCARDIOGRAPHY N/A 3/22/2023    Procedure: ECHOCARDIOGRAM, TRANSESOPHAGEAL;  Surgeon: Thuan Diagnostic Provider;  Location: [facility] EP LAB;  Service: Anesthesiology;  Laterality: N/A;    TRANSESOPHAGEAL ECHOCARDIOGRAPHY N/A 4/11/2023    Procedure: ECHOCARDIOGRAM, TRANSESOPHAGEAL;  Surgeon: Thuan Diagnostic Provider;  Location: [facility] EP LAB;  Service: Anesthesiology;  Laterality: N/A;    VASECTOMY       Family History   Problem Relation Age of Onset    Heart failure Mother     Heart disease Mother     Heart failure Father     Heart disease Father     Alcohol abuse Father     Heart failure Brother     Heart disease Brother     Diabetes Brother     No Known Problems Sister     No Known Problems Maternal Grandmother     No Known Problems Maternal Grandfather     No Known Problems Paternal Grandmother     No Known Problems Paternal Grandfather     Heart disease Sister     No Known Problems Maternal Aunt     No Known Problems Maternal Uncle     No Known Problems Paternal Aunt     No Known Problems Paternal Uncle     Amblyopia Neg Hx     Blindness Neg Hx     Cancer Neg Hx     Cataracts Neg Hx     Glaucoma Neg Hx     Hypertension Neg Hx     Macular degeneration Neg Hx     Retinal detachment Neg Hx     Strabismus Neg Hx     Stroke Neg Hx     Thyroid disease Neg Hx     Anemia Neg Hx     Arrhythmia Neg Hx      Asthma Neg Hx     Clotting disorder Neg Hx     Fainting Neg Hx     Heart attack Neg Hx     Hyperlipidemia Neg Hx     Atrial Septal Defect Neg Hx     Melanoma Neg Hx      Social History     Tobacco Use    Smoking status: Former     Current packs/day: 0.00     Average packs/day: 1 pack/day for 20.0 years (20.0 ttl pk-yrs)     Types: Cigarettes     Start date: 1960     Quit date: 1980     Years since quittin.1     Passive exposure: Never    Smokeless tobacco: Never   Substance Use Topics    Alcohol use: No    Drug use: No     Review of Systems   HENT:  Positive for nosebleeds.    Neurological:  Positive for headaches.       Physical Exam     Initial Vitals [23 0137]   BP Pulse Resp Temp SpO2   (!) 131/59 69 18 97.2 °F (36.2 °C) 98 %      MAP       --         Physical Exam    Constitutional: He appears well-developed and well-nourished. He is not diaphoretic. No distress.   HENT:   Head: Normocephalic and atraumatic.   Right Ear: External ear normal.   Left Ear: External ear normal.   Nose: Nose normal.   Dry blood right nare. Rhinorocket left nare. No active bleeding   Eyes: EOM are normal. Pupils are equal, round, and reactive to light. Right eye exhibits no discharge. Left eye exhibits no discharge.   Cardiovascular:  Normal rate, regular rhythm and normal heart sounds.     Exam reveals no gallop and no friction rub.       No murmur heard.  Pulmonary/Chest: Effort normal and breath sounds normal. No respiratory distress. He has no wheezes. He has no rhonchi. He has no rales. He exhibits no tenderness.   Abdominal: Abdomen is soft. Bowel sounds are normal. He exhibits no distension and no mass. There is no abdominal tenderness. There is no rebound and no guarding.   Musculoskeletal:         General: No edema. Normal range of motion.     Neurological: He is alert and oriented to person, place, and time. No cranial nerve deficit or sensory deficit.   Skin: Skin is warm and dry. Capillary refill  takes less than 2 seconds.         ED Course   Procedures  Labs Reviewed - No data to display       Imaging Results    None          Medications - No data to display  Medical Decision Making      The differential diagnosis includes, but is not limited to, epistaxis, anticoagulated status.      Patient is awake alert well-appearing.  No active bleeding.  Reports the bleeding stopped whenever he arrived to the emergency department.  Denies any complaints at this time.  Denies headache that is mentioned in the triage note.  He was offered to stay in the emergency department along for evaluation however declines.  He would like to go home.  He reports that he has a ENT appointment tomorrow 11/2 in [place] with his ENT physician.  We will discharge at this time.  Return precautions given.  Patient and family room voiced understanding.      Amount and/or Complexity of Data Reviewed  External Data Reviewed: notes.     Details: Chart review reveals patient was seen several days ago in this emergency department with epistaxis to left nares.  Was unresponsive to conservative management here in the ED.  Eventually was seen by ENT had packing placed.  Placed on antibiotic.    Risk  OTC drugs.            Scribe Attestation:   Scribe #1: I performed the above scribed service and the documentation accurately describes the services I performed. I attest to the accuracy of the note.    Attending Attestation:           Physician Attestation for Scribe:  Physician Attestation Statement for Scribe #1: I, Hunter Fitzpatrick MD, reviewed documentation, as scribed by Tracey Sorto in my presence, and it is both accurate and complete.                             Clinical Impression:   Final diagnoses:  [R04.0] Right-sided epistaxis (Primary)        ED Disposition Condition    Discharge Stable          ED Prescriptions    None       Follow-up Information       Follow up With Specialties Details Why Contact Info    Devon Langston Jr., MD  Internal Medicine Call   [facility] [address]  [phone]               Hunter Fitzpatrick MD  11/01/23 0457

## 2023-11-01 NOTE — DISCHARGE INSTRUCTIONS

## 2023-11-02 ENCOUNTER — OFFICE VISIT (OUTPATIENT)
Dept: OTOLARYNGOLOGY | Facility: CLINIC | Age: 83
End: 2023-11-02
Payer: MEDICARE

## 2023-11-02 VITALS — DIASTOLIC BLOOD PRESSURE: 69 MMHG | SYSTOLIC BLOOD PRESSURE: 96 MMHG | HEART RATE: 76 BPM

## 2023-11-02 DIAGNOSIS — N18.32 STAGE 3B CHRONIC KIDNEY DISEASE: ICD-10-CM

## 2023-11-02 DIAGNOSIS — Z79.01 ANTICOAGULANT LONG-TERM USE: ICD-10-CM

## 2023-11-02 DIAGNOSIS — R04.0 ACUTE ANTERIOR EPISTAXIS: ICD-10-CM

## 2023-11-02 DIAGNOSIS — R04.0 RECURRENT EPISTAXIS: Primary | ICD-10-CM

## 2023-11-02 PROCEDURE — 3078F DIAST BP <80 MM HG: CPT | Mod: HCNC,CPTII,S$GLB, | Performed by: STUDENT IN AN ORGANIZED HEALTH CARE EDUCATION/TRAINING PROGRAM

## 2023-11-02 PROCEDURE — 1111F PR DISCHARGE MEDS RECONCILED W/ CURRENT OUTPATIENT MED LIST: ICD-10-PCS | Mod: HCNC,CPTII,S$GLB, | Performed by: STUDENT IN AN ORGANIZED HEALTH CARE EDUCATION/TRAINING PROGRAM

## 2023-11-02 PROCEDURE — 99999 PR PBB SHADOW E&M-EST. PATIENT-LVL III: ICD-10-PCS | Mod: PBBFAC,HCNC,, | Performed by: STUDENT IN AN ORGANIZED HEALTH CARE EDUCATION/TRAINING PROGRAM

## 2023-11-02 PROCEDURE — 3074F PR MOST RECENT SYSTOLIC BLOOD PRESSURE < 130 MM HG: ICD-10-PCS | Mod: HCNC,CPTII,S$GLB, | Performed by: STUDENT IN AN ORGANIZED HEALTH CARE EDUCATION/TRAINING PROGRAM

## 2023-11-02 PROCEDURE — 31231 PR NASAL ENDOSCOPY, DX: ICD-10-PCS | Mod: HCNC,S$GLB,, | Performed by: STUDENT IN AN ORGANIZED HEALTH CARE EDUCATION/TRAINING PROGRAM

## 2023-11-02 PROCEDURE — 3074F SYST BP LT 130 MM HG: CPT | Mod: HCNC,CPTII,S$GLB, | Performed by: STUDENT IN AN ORGANIZED HEALTH CARE EDUCATION/TRAINING PROGRAM

## 2023-11-02 PROCEDURE — 99215 OFFICE O/P EST HI 40 MIN: CPT | Mod: 25,HCNC,S$GLB, | Performed by: STUDENT IN AN ORGANIZED HEALTH CARE EDUCATION/TRAINING PROGRAM

## 2023-11-02 PROCEDURE — 31231 NASAL ENDOSCOPY DX: CPT | Mod: HCNC,S$GLB,, | Performed by: STUDENT IN AN ORGANIZED HEALTH CARE EDUCATION/TRAINING PROGRAM

## 2023-11-02 PROCEDURE — 1126F AMNT PAIN NOTED NONE PRSNT: CPT | Mod: HCNC,CPTII,S$GLB, | Performed by: STUDENT IN AN ORGANIZED HEALTH CARE EDUCATION/TRAINING PROGRAM

## 2023-11-02 PROCEDURE — 1160F RVW MEDS BY RX/DR IN RCRD: CPT | Mod: HCNC,CPTII,S$GLB, | Performed by: STUDENT IN AN ORGANIZED HEALTH CARE EDUCATION/TRAINING PROGRAM

## 2023-11-02 PROCEDURE — 99999 PR PBB SHADOW E&M-EST. PATIENT-LVL III: CPT | Mod: PBBFAC,HCNC,, | Performed by: STUDENT IN AN ORGANIZED HEALTH CARE EDUCATION/TRAINING PROGRAM

## 2023-11-02 PROCEDURE — 1159F MED LIST DOCD IN RCRD: CPT | Mod: HCNC,CPTII,S$GLB, | Performed by: STUDENT IN AN ORGANIZED HEALTH CARE EDUCATION/TRAINING PROGRAM

## 2023-11-02 PROCEDURE — 3078F PR MOST RECENT DIASTOLIC BLOOD PRESSURE < 80 MM HG: ICD-10-PCS | Mod: HCNC,CPTII,S$GLB, | Performed by: STUDENT IN AN ORGANIZED HEALTH CARE EDUCATION/TRAINING PROGRAM

## 2023-11-02 PROCEDURE — 3072F PR LOW RISK FOR RETINOPATHY: ICD-10-PCS | Mod: HCNC,CPTII,S$GLB, | Performed by: STUDENT IN AN ORGANIZED HEALTH CARE EDUCATION/TRAINING PROGRAM

## 2023-11-02 PROCEDURE — 1126F PR PAIN SEVERITY QUANTIFIED, NO PAIN PRESENT: ICD-10-PCS | Mod: HCNC,CPTII,S$GLB, | Performed by: STUDENT IN AN ORGANIZED HEALTH CARE EDUCATION/TRAINING PROGRAM

## 2023-11-02 PROCEDURE — 3072F LOW RISK FOR RETINOPATHY: CPT | Mod: HCNC,CPTII,S$GLB, | Performed by: STUDENT IN AN ORGANIZED HEALTH CARE EDUCATION/TRAINING PROGRAM

## 2023-11-02 PROCEDURE — 1160F PR REVIEW ALL MEDS BY PRESCRIBER/CLIN PHARMACIST DOCUMENTED: ICD-10-PCS | Mod: HCNC,CPTII,S$GLB, | Performed by: STUDENT IN AN ORGANIZED HEALTH CARE EDUCATION/TRAINING PROGRAM

## 2023-11-02 PROCEDURE — 1159F PR MEDICATION LIST DOCUMENTED IN MEDICAL RECORD: ICD-10-PCS | Mod: HCNC,CPTII,S$GLB, | Performed by: STUDENT IN AN ORGANIZED HEALTH CARE EDUCATION/TRAINING PROGRAM

## 2023-11-02 PROCEDURE — 99215 PR OFFICE/OUTPT VISIT, EST, LEVL V, 40-54 MIN: ICD-10-PCS | Mod: 25,HCNC,S$GLB, | Performed by: STUDENT IN AN ORGANIZED HEALTH CARE EDUCATION/TRAINING PROGRAM

## 2023-11-02 PROCEDURE — 1111F DSCHRG MED/CURRENT MED MERGE: CPT | Mod: HCNC,CPTII,S$GLB, | Performed by: STUDENT IN AN ORGANIZED HEALTH CARE EDUCATION/TRAINING PROGRAM

## 2023-11-02 NOTE — H&P (VIEW-ONLY)
Subjective:      Dariel is a 83 y.o. male who comes for follow-up of  epistaxis .  His last visit with me was on 10/11/23.  He underwent SPA embolization on 10/5/23. Was seen again in the ED on 10/29 in [place] for acute left sided epistaxis. RR was placed. Here for follow up.     His current sinus regime consists of: Nasal saline.     The patient's medications, allergies, past medical, surgical, social and family histories were reviewed and updated as appropriate.    A detailed review of systems was obtained with pertinent positives as per the above HPI, and otherwise negative.        Objective:     BP 96/69 (BP Location: Left arm, Patient Position: Sitting, BP Method: Small (Automatic))   Pulse 76        Constitutional:   Vital signs are normal. He appears well-developed and well-nourished.     Head:  Normocephalic and atraumatic.     Ears:  Hearing normal to normal and whispered voice; external ear normal without scars, lesions, or masses; ear canal, tympanic membrane, and middle ear normal..   Right Ear: No swelling. Tympanic membrane is not perforated and not bulging. No middle ear effusion.   Left Ear: No swelling. Tympanic membrane is not perforated and not bulging.  No middle ear effusion.     Nose:  Nose normal including turbinates, nasal mucosa, sinuses and nasal septum. No epistaxis.         Mouth/Throat  Oropharynx clear and moist without lesions or asymmetry and normal uvula midline. Normal dentition. No tonsillar abscesses. Tonsillar exudate.      Neck:  Neck normal without thyromegaly masses, asymmetry, normal tracheal structure, crepitus, and tenderness, thyroid normal, trachea normal, phonation normal, full range of motion with neck supple and no adenopathy. No stridor present.        Head (right side): No submental adenopathy present.        Head (left side): No submental adenopathy present.     He has no cervical adenopathy.     Pulmonary/Chest:   No stridor.       Procedure    Nasal  "endoscopy performed.  See procedure note.    Nasal Endoscopy:  11/2/2023    The use of diagnostic nasal endoscopy was considered medically necessary for the evaluation and visualization of the nasal anatomy for symptoms suggestive of nasal or sinus origin. Physical examination (including a nasal speculum evaluation) did not provide sufficient clinical information to establish a diagnosis, or symptoms did not improve or worsened following treatment.     The nasal cavity was decongested with topical 1% phenylephrine and anesthetized with 4% lidocaine.  A rigid 0-degree endoscope was introduced into the nasal cavity.    The patient was seated in the examination chair. After discussion of risks and benefits, a nasal endoscope was inserted into the nose the endoscope was passed along the left nasal floor to the nasopharynx. It was then passed between the middle and superior meatus, nasal turbinates, nasal septum, nasopharynx and sphenoethmoid region. The nasal endoscope was withdrawn and there was no complications. An identical procedure was performed on the right side. I was present for the entire procedure.The patient tolerated the above procedure well. The findings of this procedure can be found in the dictated note from 11/2/2023 visit.      Rhinorocket removed. Profuse bleeding notes at the inferior septum at entry point of incisive canal. Merocel x2 placed.     Data Reviewed    WBC   Date Value   10/29/2023 5.14 x10(3)/mcL   10/09/2023 6.83 K/uL     Eosinophil % (%)   Date Value   10/08/2023 1.6     Eos # (K/uL)   Date Value   10/08/2023 0.1     Platelets (K/uL)   Date Value   10/09/2023 149 (L)     Platelet (x10(3)/mcL)   Date Value   10/29/2023 158     Glucose (mg/dL)   Date Value   10/09/2023 114 (H)     No results found for: "IGE"    No sinus imaging available.      Assessment:     1. Recurrent epistaxis    2. Anticoagulant long-term use    3. Stage 3b chronic kidney disease    4. Acute anterior epistaxis       "   Plan:     - Hemostatic with merocel in place.   - H/H was not checked in ED  - Will draw labs tomorrow in preop.    - OR tomorrow for definitve cautery and control of epistaxis.  - The risks and benefits of endoscopic surgery were discussed with the patient in detail, which include but are not limited to pain, bleeding, infection, the need for reoperation, injury to orbit or orbital contents, injury to brain or meninges, and unforeseeable complications of surgery including myocardial infarction, stroke or pulmonary embolus.    Jono Russell MD

## 2023-11-02 NOTE — PROGRESS NOTES
Subjective:      Dariel is a 83 y.o. male who comes for follow-up of  epistaxis .  His last visit with me was on 10/11/23.  He underwent SPA embolization on 10/5/23. Was seen again in the ED on 10/29 in [place] for acute left sided epistaxis. RR was placed. Here for follow up.     His current sinus regime consists of: Nasal saline.     The patient's medications, allergies, past medical, surgical, social and family histories were reviewed and updated as appropriate.    A detailed review of systems was obtained with pertinent positives as per the above HPI, and otherwise negative.        Objective:     BP 96/69 (BP Location: Left arm, Patient Position: Sitting, BP Method: Small (Automatic))   Pulse 76        Constitutional:   Vital signs are normal. He appears well-developed and well-nourished.     Head:  Normocephalic and atraumatic.     Ears:  Hearing normal to normal and whispered voice; external ear normal without scars, lesions, or masses; ear canal, tympanic membrane, and middle ear normal..   Right Ear: No swelling. Tympanic membrane is not perforated and not bulging. No middle ear effusion.   Left Ear: No swelling. Tympanic membrane is not perforated and not bulging.  No middle ear effusion.     Nose:  Nose normal including turbinates, nasal mucosa, sinuses and nasal septum. No epistaxis.         Mouth/Throat  Oropharynx clear and moist without lesions or asymmetry and normal uvula midline. Normal dentition. No tonsillar abscesses. Tonsillar exudate.      Neck:  Neck normal without thyromegaly masses, asymmetry, normal tracheal structure, crepitus, and tenderness, thyroid normal, trachea normal, phonation normal, full range of motion with neck supple and no adenopathy. No stridor present.        Head (right side): No submental adenopathy present.        Head (left side): No submental adenopathy present.     He has no cervical adenopathy.     Pulmonary/Chest:   No stridor.       Procedure    Nasal  "endoscopy performed.  See procedure note.    Nasal Endoscopy:  11/2/2023    The use of diagnostic nasal endoscopy was considered medically necessary for the evaluation and visualization of the nasal anatomy for symptoms suggestive of nasal or sinus origin. Physical examination (including a nasal speculum evaluation) did not provide sufficient clinical information to establish a diagnosis, or symptoms did not improve or worsened following treatment.     The nasal cavity was decongested with topical 1% phenylephrine and anesthetized with 4% lidocaine.  A rigid 0-degree endoscope was introduced into the nasal cavity.    The patient was seated in the examination chair. After discussion of risks and benefits, a nasal endoscope was inserted into the nose the endoscope was passed along the left nasal floor to the nasopharynx. It was then passed between the middle and superior meatus, nasal turbinates, nasal septum, nasopharynx and sphenoethmoid region. The nasal endoscope was withdrawn and there was no complications. An identical procedure was performed on the right side. I was present for the entire procedure.The patient tolerated the above procedure well. The findings of this procedure can be found in the dictated note from 11/2/2023 visit.      Rhinorocket removed. Profuse bleeding notes at the inferior septum at entry point of incisive canal. Merocel x2 placed.     Data Reviewed    WBC   Date Value   10/29/2023 5.14 x10(3)/mcL   10/09/2023 6.83 K/uL     Eosinophil % (%)   Date Value   10/08/2023 1.6     Eos # (K/uL)   Date Value   10/08/2023 0.1     Platelets (K/uL)   Date Value   10/09/2023 149 (L)     Platelet (x10(3)/mcL)   Date Value   10/29/2023 158     Glucose (mg/dL)   Date Value   10/09/2023 114 (H)     No results found for: "IGE"    No sinus imaging available.      Assessment:     1. Recurrent epistaxis    2. Anticoagulant long-term use    3. Stage 3b chronic kidney disease    4. Acute anterior epistaxis       "   Plan:     - Hemostatic with merocel in place.   - H/H was not checked in ED  - Will draw labs tomorrow in preop.    - OR tomorrow for definitve cautery and control of epistaxis.  - The risks and benefits of endoscopic surgery were discussed with the patient in detail, which include but are not limited to pain, bleeding, infection, the need for reoperation, injury to orbit or orbital contents, injury to brain or meninges, and unforeseeable complications of surgery including myocardial infarction, stroke or pulmonary embolus.    Jono Russell MD

## 2023-11-03 ENCOUNTER — HOSPITAL ENCOUNTER (OUTPATIENT)
Facility: HOSPITAL | Age: 83
Discharge: HOME | End: 2023-11-03
Attending: STUDENT IN AN ORGANIZED HEALTH CARE EDUCATION/TRAINING PROGRAM | Admitting: STUDENT IN AN ORGANIZED HEALTH CARE EDUCATION/TRAINING PROGRAM
Payer: MEDICARE

## 2023-11-03 ENCOUNTER — ANESTHESIA EVENT (OUTPATIENT)
Dept: SURGERY | Facility: HOSPITAL | Age: 83
End: 2023-11-03
Payer: MEDICARE

## 2023-11-03 ENCOUNTER — ANESTHESIA (OUTPATIENT)
Dept: SURGERY | Facility: HOSPITAL | Age: 83
End: 2023-11-03
Payer: MEDICARE

## 2023-11-03 VITALS
BODY MASS INDEX: 27.64 KG/M2 | TEMPERATURE: 98 F | HEIGHT: 66 IN | OXYGEN SATURATION: 97 % | SYSTOLIC BLOOD PRESSURE: 172 MMHG | WEIGHT: 172 LBS | RESPIRATION RATE: 18 BRPM | DIASTOLIC BLOOD PRESSURE: 75 MMHG | HEART RATE: 63 BPM

## 2023-11-03 DIAGNOSIS — R04.0 RECURRENT EPISTAXIS: Primary | ICD-10-CM

## 2023-11-03 LAB
ABO + RH BLD: NORMAL
BASOPHILS # BLD AUTO: 0.04 K/UL (ref 0–0.2)
BASOPHILS NFR BLD: 0.8 % (ref 0–1.9)
BLD GP AB SCN CELLS X3 SERPL QL: NORMAL
BLD PROD TYP BPU: NORMAL
BLOOD UNIT EXPIRATION DATE: NORMAL
BLOOD UNIT TYPE CODE: 9500
BLOOD UNIT TYPE: NORMAL
CODING SYSTEM: NORMAL
CROSSMATCH INTERPRETATION: NORMAL
DIFFERENTIAL METHOD: ABNORMAL
DISPENSE STATUS: NORMAL
EOSINOPHIL # BLD AUTO: 0.1 K/UL (ref 0–0.5)
EOSINOPHIL NFR BLD: 1.4 % (ref 0–8)
ERYTHROCYTE [DISTWIDTH] IN BLOOD BY AUTOMATED COUNT: 15.9 % (ref 11.5–14.5)
HCT VFR BLD AUTO: 23.2 % (ref 40–54)
HGB BLD-MCNC: 7.1 G/DL (ref 14–18)
IMM GRANULOCYTES # BLD AUTO: 0.02 K/UL (ref 0–0.04)
IMM GRANULOCYTES NFR BLD AUTO: 0.4 % (ref 0–0.5)
LYMPHOCYTES # BLD AUTO: 1.2 K/UL (ref 1–4.8)
LYMPHOCYTES NFR BLD: 23.8 % (ref 18–48)
MCH RBC QN AUTO: 30.6 PG (ref 27–31)
MCHC RBC AUTO-ENTMCNC: 30.6 G/DL (ref 32–36)
MCV RBC AUTO: 100 FL (ref 82–98)
MONOCYTES # BLD AUTO: 0.5 K/UL (ref 0.3–1)
MONOCYTES NFR BLD: 10.4 % (ref 4–15)
NEUTROPHILS # BLD AUTO: 3.3 K/UL (ref 1.8–7.7)
NEUTROPHILS NFR BLD: 63.2 % (ref 38–73)
NRBC BLD-RTO: 0 /100 WBC
PLATELET # BLD AUTO: 177 K/UL (ref 150–450)
PMV BLD AUTO: 10.9 FL (ref 9.2–12.9)
POCT GLUCOSE: 114 MG/DL (ref 70–110)
RBC # BLD AUTO: 2.32 M/UL (ref 4.6–6.2)
SPECIMEN OUTDATE: NORMAL
TRANS ERYTHROCYTES VOL PATIENT: NORMAL ML
WBC # BLD AUTO: 5.17 K/UL (ref 3.9–12.7)

## 2023-11-03 PROCEDURE — 85025 COMPLETE CBC W/AUTO DIFF WBC: CPT | Mod: HCNC | Performed by: STUDENT IN AN ORGANIZED HEALTH CARE EDUCATION/TRAINING PROGRAM

## 2023-11-03 PROCEDURE — 37000008 HC ANESTHESIA 1ST 15 MINUTES: Mod: HCNC | Performed by: STUDENT IN AN ORGANIZED HEALTH CARE EDUCATION/TRAINING PROGRAM

## 2023-11-03 PROCEDURE — 63600175 PHARM REV CODE 636 W HCPCS: Mod: HCNC | Performed by: STUDENT IN AN ORGANIZED HEALTH CARE EDUCATION/TRAINING PROGRAM

## 2023-11-03 PROCEDURE — P9021 RED BLOOD CELLS UNIT: HCPCS | Mod: HCNC | Performed by: STUDENT IN AN ORGANIZED HEALTH CARE EDUCATION/TRAINING PROGRAM

## 2023-11-03 PROCEDURE — 25000003 PHARM REV CODE 250: Mod: HCNC | Performed by: STUDENT IN AN ORGANIZED HEALTH CARE EDUCATION/TRAINING PROGRAM

## 2023-11-03 PROCEDURE — 71000016 HC POSTOP RECOV ADDL HR: Mod: HCNC | Performed by: STUDENT IN AN ORGANIZED HEALTH CARE EDUCATION/TRAINING PROGRAM

## 2023-11-03 PROCEDURE — 86920 COMPATIBILITY TEST SPIN: CPT | Mod: HCNC | Performed by: STUDENT IN AN ORGANIZED HEALTH CARE EDUCATION/TRAINING PROGRAM

## 2023-11-03 PROCEDURE — 36000707: Mod: HCNC | Performed by: STUDENT IN AN ORGANIZED HEALTH CARE EDUCATION/TRAINING PROGRAM

## 2023-11-03 PROCEDURE — 82962 GLUCOSE BLOOD TEST: CPT | Mod: HCNC,91 | Performed by: STUDENT IN AN ORGANIZED HEALTH CARE EDUCATION/TRAINING PROGRAM

## 2023-11-03 PROCEDURE — D9220A PRA ANESTHESIA: ICD-10-PCS | Mod: HCNC,ANES,, | Performed by: ANESTHESIOLOGY

## 2023-11-03 PROCEDURE — 37000009 HC ANESTHESIA EA ADD 15 MINS: Mod: HCNC | Performed by: STUDENT IN AN ORGANIZED HEALTH CARE EDUCATION/TRAINING PROGRAM

## 2023-11-03 PROCEDURE — 31238 NSL/SINS NDSC SRG NSL HEMRRG: CPT | Mod: HCNC,LT,GC, | Performed by: STUDENT IN AN ORGANIZED HEALTH CARE EDUCATION/TRAINING PROGRAM

## 2023-11-03 PROCEDURE — D9220A PRA ANESTHESIA: Mod: HCNC,ANES,, | Performed by: ANESTHESIOLOGY

## 2023-11-03 PROCEDURE — 27201423 OPTIME MED/SURG SUP & DEVICES STERILE SUPPLY: Mod: HCNC | Performed by: STUDENT IN AN ORGANIZED HEALTH CARE EDUCATION/TRAINING PROGRAM

## 2023-11-03 PROCEDURE — 36000706: Mod: HCNC | Performed by: STUDENT IN AN ORGANIZED HEALTH CARE EDUCATION/TRAINING PROGRAM

## 2023-11-03 PROCEDURE — 71000015 HC POSTOP RECOV 1ST HR: Mod: HCNC | Performed by: STUDENT IN AN ORGANIZED HEALTH CARE EDUCATION/TRAINING PROGRAM

## 2023-11-03 PROCEDURE — D9220A PRA ANESTHESIA: Mod: HCNC,CRNA,, | Performed by: STUDENT IN AN ORGANIZED HEALTH CARE EDUCATION/TRAINING PROGRAM

## 2023-11-03 PROCEDURE — 71000044 HC DOSC ROUTINE RECOVERY FIRST HOUR: Mod: HCNC | Performed by: STUDENT IN AN ORGANIZED HEALTH CARE EDUCATION/TRAINING PROGRAM

## 2023-11-03 PROCEDURE — 86901 BLOOD TYPING SEROLOGIC RH(D): CPT | Mod: HCNC | Performed by: STUDENT IN AN ORGANIZED HEALTH CARE EDUCATION/TRAINING PROGRAM

## 2023-11-03 PROCEDURE — D9220A PRA ANESTHESIA: ICD-10-PCS | Mod: HCNC,CRNA,, | Performed by: STUDENT IN AN ORGANIZED HEALTH CARE EDUCATION/TRAINING PROGRAM

## 2023-11-03 PROCEDURE — 31238 PR NASAL/SINUS ENDOSCOPY,W/CONTROL NASAL HEM: ICD-10-PCS | Mod: HCNC,LT,GC, | Performed by: STUDENT IN AN ORGANIZED HEALTH CARE EDUCATION/TRAINING PROGRAM

## 2023-11-03 RX ORDER — SUCCINYLCHOLINE CHLORIDE 20 MG/ML
INJECTION INTRAMUSCULAR; INTRAVENOUS
Status: DISCONTINUED | OUTPATIENT
Start: 2023-11-03 | End: 2023-11-03

## 2023-11-03 RX ORDER — OXYMETAZOLINE HCL 0.05 %
SPRAY, NON-AEROSOL (ML) NASAL
Status: DISCONTINUED | OUTPATIENT
Start: 2023-11-03 | End: 2023-11-03 | Stop reason: HOSPADM

## 2023-11-03 RX ORDER — CEFAZOLIN 2 G/1
INJECTION, POWDER, FOR SOLUTION INTRAMUSCULAR; INTRAVENOUS
Status: DISCONTINUED | OUTPATIENT
Start: 2023-11-03 | End: 2023-11-03

## 2023-11-03 RX ORDER — ONDANSETRON 2 MG/ML
INJECTION INTRAMUSCULAR; INTRAVENOUS
Status: DISCONTINUED | OUTPATIENT
Start: 2023-11-03 | End: 2023-11-03

## 2023-11-03 RX ORDER — FENTANYL CITRATE 50 UG/ML
25 INJECTION, SOLUTION INTRAMUSCULAR; INTRAVENOUS EVERY 5 MIN PRN
Status: DISCONTINUED | OUTPATIENT
Start: 2023-11-03 | End: 2023-11-03 | Stop reason: HOSPADM

## 2023-11-03 RX ORDER — PHENYLEPHRINE HCL IN 0.9% NACL 1 MG/10 ML
SYRINGE (ML) INTRAVENOUS
Status: DISCONTINUED | OUTPATIENT
Start: 2023-11-03 | End: 2023-11-03

## 2023-11-03 RX ORDER — FENTANYL CITRATE 50 UG/ML
INJECTION, SOLUTION INTRAMUSCULAR; INTRAVENOUS
Status: DISCONTINUED | OUTPATIENT
Start: 2023-11-03 | End: 2023-11-03

## 2023-11-03 RX ORDER — EPINEPHRINE 1 MG/ML
INJECTION, SOLUTION, CONCENTRATE INTRAVENOUS
Status: DISCONTINUED | OUTPATIENT
Start: 2023-11-03 | End: 2023-11-03 | Stop reason: HOSPADM

## 2023-11-03 RX ORDER — LIDOCAINE HYDROCHLORIDE 20 MG/ML
INJECTION, SOLUTION EPIDURAL; INFILTRATION; INTRACAUDAL; PERINEURAL
Status: DISCONTINUED | OUTPATIENT
Start: 2023-11-03 | End: 2023-11-03

## 2023-11-03 RX ORDER — SODIUM CHLORIDE 0.9 % (FLUSH) 0.9 %
10 SYRINGE (ML) INJECTION
Status: DISCONTINUED | OUTPATIENT
Start: 2023-11-03 | End: 2023-11-03 | Stop reason: HOSPADM

## 2023-11-03 RX ORDER — PROPOFOL 10 MG/ML
VIAL (ML) INTRAVENOUS
Status: DISCONTINUED | OUTPATIENT
Start: 2023-11-03 | End: 2023-11-03

## 2023-11-03 RX ORDER — DEXAMETHASONE SODIUM PHOSPHATE 4 MG/ML
INJECTION, SOLUTION INTRA-ARTICULAR; INTRALESIONAL; INTRAMUSCULAR; INTRAVENOUS; SOFT TISSUE
Status: DISCONTINUED | OUTPATIENT
Start: 2023-11-03 | End: 2023-11-03

## 2023-11-03 RX ADMIN — SODIUM CHLORIDE: 9 INJECTION, SOLUTION INTRAVENOUS at 12:11

## 2023-11-03 RX ADMIN — PROPOFOL 100 MG: 10 INJECTION, EMULSION INTRAVENOUS at 01:11

## 2023-11-03 RX ADMIN — FENTANYL CITRATE 50 MCG: 50 INJECTION INTRAMUSCULAR; INTRAVENOUS at 01:11

## 2023-11-03 RX ADMIN — DEXAMETHASONE SODIUM PHOSPHATE 4 MG: 4 INJECTION INTRA-ARTICULAR; INTRALESIONAL; INTRAMUSCULAR; INTRAVENOUS; SOFT TISSUE at 01:11

## 2023-11-03 RX ADMIN — ONDANSETRON 4 MG: 2 INJECTION INTRAMUSCULAR; INTRAVENOUS at 01:11

## 2023-11-03 RX ADMIN — Medication 100 MCG: at 01:11

## 2023-11-03 RX ADMIN — CEFAZOLIN 2 G: 330 INJECTION, POWDER, FOR SOLUTION INTRAMUSCULAR; INTRAVENOUS at 01:11

## 2023-11-03 RX ADMIN — LIDOCAINE HYDROCHLORIDE 100 MG: 20 INJECTION, SOLUTION EPIDURAL; INFILTRATION; INTRACAUDAL at 01:11

## 2023-11-03 RX ADMIN — SUCCINYLCHOLINE CHLORIDE 140 MG: 20 INJECTION, SOLUTION INTRAMUSCULAR; INTRAVENOUS; PARENTERAL at 01:11

## 2023-11-03 NOTE — PLAN OF CARE
Discharge instructions given and explained to patient and family with verbalized understanding. VSS.  Denies N/V. Rates pain level as tolerable. IV removed. Pt left floor via W/C, stable for transport, responsible person available for transport home.

## 2023-11-03 NOTE — BRIEF OP NOTE
[facility] - Surgery (2nd Fl)  Brief Operative Note    Surgery Date: 11/3/2023     Surgeon(s) and Role:     * Jono Russell MD - Primary     * Yenifer Sandoval MD - Resident - Assisting        Pre-op Diagnosis:  Recurrent epistaxis [R04.0]    Post-op Diagnosis:  Post-Op Diagnosis Codes:     * Recurrent epistaxis [R04.0]    Procedure(s) (LRB):  CAUTERIZATION, NOSE, ENDOSCOPIC (Bilateral)    Anesthesia: General    Operative Findings: Bleeding from left nares and small part of right nares, both cauterized. Posisept placed on left.    Estimated Blood Loss: 20 mL         Specimens:   Specimen (24h ago, onward)      None              Discharge Note    OUTCOME: Patient tolerated treatment/procedure well without complication and is now ready for discharge.    DISPOSITION: Home or Self Care    FINAL DIAGNOSIS:  Recurrent epistaxis    FOLLOWUP: In clinic      DISCHARGE INSTRUCTIONS:    Discharge Procedure Orders   Diet Adult Regular     Notify your health care provider if you experience any of the following:  difficulty breathing or increased cough     Notify your health care provider if you experience any of the following:  severe uncontrolled pain

## 2023-11-03 NOTE — PROGRESS NOTES
1405- notified Dr. Russell at bedside pt needs transfuse order.  He said to page the resident.     1409- message Dr. Sandoval.     1415- paged Dr. Sandoval.    Waiting on transfuse order.

## 2023-11-03 NOTE — TRANSFER OF CARE
"Anesthesia Transfer of Care Note    Patient: Dariel Urbina    Procedure(s) Performed: Procedure(s) (LRB):  CAUTERIZATION, NOSE, ENDOSCOPIC (Bilateral)    Patient location: PACU    Anesthesia Type: general    Transport from OR: Transported from OR on 6-10 L/min O2 by face mask with adequate spontaneous ventilation    Post pain: adequate analgesia    Post assessment: no apparent anesthetic complications    Post vital signs: stable    Level of consciousness: responds to stimulation    Nausea/Vomiting: no nausea/vomiting    Complications: none    Transfer of care protocol was followed      Last vitals: Visit Vitals  BP (!) 150/69   Pulse 78   Temp 36.4 °C (97.5 °F) (Oral)   Resp 20   Ht 5' 6" (1.676 m)   Wt 78 kg (172 lb)   SpO2 95%   BMI 27.76 kg/m²     "

## 2023-11-03 NOTE — ANESTHESIA PROCEDURE NOTES
Intubation    Date/Time: 11/3/2023 1:07 PM    Performed by: Ailyn Garcia CRNA  Authorized by: Floyd Johnson MD    Intubation:     Induction:  Rapid sequence induction    Intubated:  Postinduction    Mask Ventilation:  Not attempted    Attempts:  1    Attempted By:  CRNA    Method of Intubation:  Video laryngoscopy    Blade:  Osorio 3    Laryngeal View Grade: Grade I - full view of cords      Difficult Airway Encountered?: No      Complications:  None    Airway Device:  Oral endotracheal tube    Airway Device Size:  7.5    Style/Cuff Inflation:  Cuffed    Inflation Amount (mL):  7    Tube secured:  22    Secured at:  The lips    Placement Verified By:  Capnometry    Complicating Factors:  None    Findings Post-Intubation:  BS equal bilateral and atraumatic/condition of teeth unchanged

## 2023-11-03 NOTE — DISCHARGE INSTRUCTIONS
INSTRUCTIONS TO FOLLOW AFTER SINUS AND NASAL SURGERY  DR. BARTON - [facility]    Office hours:  Weekdays 8:00 am to 5:00 pm.  Please call [phone] and ask to speak with his nurse, Sowmya.    After-hours & weekends:  Please call [phone] and ask to speak with the ENT resident doctor.    Your first office visit with Dr. Russell after surgery should have been already scheduled.  If you dont know when it is, call Dr. Russell's nurse Sowmya at [phone].    Please call IMMMEDIATELY if you have:  Temperature of 101° F or greater  Any unusual, painful swelling  Any active bleeding that saturates more than a 4x4 gauze  Any thick drainage green or yellow drainage  Changes in vision or swelling around the eye  Pain not relieved by your prescribed pain medication    ACTIVITY:    Sleep on your back with the head of the bead elevated, up on 2-3 pillows, or in a recliner for the first 3 to 5 days. This will help with swelling.     After surgery you may have a lot of nasal drainage. This is normal. You may breathe through your nose if youre able but avoid inhaling forcibly. Let all drainage fall on your mustache dressing and change it as needed.    You may wake up after surgery with thick white stockings on. Wear them until you are walking around more. It is important to walk around often while at home to keep your blood circulating and prevent blood clots.    If you use CPAP or BiPAP to sleep at night, you should wait at least 48 hours before resuming use.  Dr. Russell will advise you when it is safe to do this.    You may shower 24 hours after surgery.    RESTRICTED ACTIVITIES AFTER SURGERY:    Do NOT blow your nose until you see Dr. Russell in clinic. If you have to sneeze or cough, do so with your mouth open.     AVOID all heavy lifting, straining or bending for 2 weeks.     AVOID any sexual activity for 2 weeks after surgery.    AVOID semi-contact sports or vigorous exercising for 3-4 weeks. Dr. Russell will let you know  when you are cleared to resume exercise.    AVOID flying or swimming for 2 weeks.      Do NOT operate a motor vehicle or any type of heavy machinery within 24 hours of taking pain medication.    DO NOT smoke or be around smokers.    AVOID irritating substances that might make you sneeze, such as dust, chalk, harsh chemicals, and allergic triggers.  This might also include spicy foods.    DRESSINGS:    Change the gauze mustache dressing under your nose as needed. (If unsure what this dressing is or how to do this, ask your doctor or nurse before you leave the hospital.) You may have pinkish-red drainage for 2-3 days.    Usually there is no gauze packing placed inside the nose.  If packing is necessary, you will be informed by your surgeon.  Do not touch or pull at the packing. The packing will be removed by your doctor at your first visit after surgery.     You may also have a dissolvable stent or dissolvable sponge placed into the sinuses during surgery.  These usually do not need to be removed unless you are told otherwise by Dr. Russell.  You may notice small fragments of these items come out of your nose in the weeks following surgery.    MEDICATIONS:    After surgery, you will be sent home with prescriptions for pain medication and an anti-nausea medication.  Antibiotics are usually not necessary.    Most people need pain medication for the first few days after surgery, although a narcotic is rarely necessary.  The best pain control comes from a combination of acetaminophen (Tylenol) and ibuprofen (Motrin).  You will be given prescriptions for these at the recommended dose.  These can be alternated so that you are taking something every 2 or 3 hours.    Some people have problems with bowel movements after surgery. If you have NOT had a bowel movement 3-5 days after surgery, go to your local pharmacy and purchase an over the counter stool softener such as COLACE. You can also ask the pharmacist for his or her  recommendation. If you still do not have a bowel movement after starting the softener, please call the office.    You will need these over-the-counter medications after surgery:    SALINE SINUS RINSE (Rafael Med brand):  You will use this to rinse out your nose and sinuses after surgery.  Begin doing this the day after surgery, unless instructed otherwise by Dr. Russell.  You should do this 2 times a day, following the instructions on the box.  AFRIN (regular strength): Only use if you have nasal congestion or bleeding. Use 2 times per day for 3 days, stop for 1 day, continue 2 times per day for 3 days, then stop completely.  NOTE:  You may not need to do this at all.    DIET:    Avoid hot and spicy foods for 1 week after surgery.    Begin with bland foods the evening after surgery and advance to your regular diet as tolerated.  It is not necessary to take only soft food unless you are recovering from tonsil surgery.    Drink plenty of fluids (water is best).     Avoid alcoholic and caffeinated beverages for 1 week after surgery because they can cause you to become dehydrated.

## 2023-11-03 NOTE — ANESTHESIA POSTPROCEDURE EVALUATION
Anesthesia Post Evaluation    Patient: Dariel Urbina    Procedure(s) Performed: Procedure(s) (LRB):  CAUTERIZATION, NOSE, ENDOSCOPIC (Bilateral)    Final Anesthesia Type: general      Patient location during evaluation: PACU  Patient participation: Yes- Able to Participate  Level of consciousness: awake and alert, awake and oriented  Post-procedure vital signs: reviewed and stable  Pain management: adequate  Airway patency: patent    PONV status at discharge: No PONV  Anesthetic complications: no      Cardiovascular status: blood pressure returned to baseline, stable and hemodynamically stable  Respiratory status: unassisted, spontaneous ventilation and room air  Hydration status: euvolemic  Follow-up not needed.      Vitals Value Taken Time   /72 11/03/23 1631   Temp 36.7 °C (98.1 °F) 11/03/23 1520   Pulse 63 11/03/23 1635   Resp 18 11/03/23 1635   SpO2 88 % 11/03/23 1631   Vitals shown include unvalidated device data.      No case tracking events are documented in the log.      Pain/Sanjeev Score: Sanjeev Score: 10 (11/3/2023  2:30 PM)  Modified Sanjeev Score: 20 (11/3/2023  2:30 PM)

## 2023-11-03 NOTE — ANESTHESIA PREPROCEDURE EVALUATION
11/03/2023  Dariel Urbina is a 83 y.o., male.      Pre-op Assessment    I have reviewed the Patient Summary Reports.     I have reviewed the Nursing Notes. I have reviewed the NPO Status.   I have reviewed the Medications.     Review of Systems  Anesthesia Hx:  No previous Anesthesia   Neg history of prior surgery.          Denies Family Hx of Anesthesia complications.    Denies Personal Hx of Anesthesia complications.                    Social:  Non-Smoker, No Alcohol Use       Hematology/Oncology:  Hematology Normal   Oncology Normal                                   EENT/Dental:  EENT/Dental Normal           Cardiovascular:  Exercise tolerance: good   Hypertension, well controlled  Past MI CAD  asymptomatic     CHF                                 Pulmonary:  Pulmonary Normal                       Renal/:  Chronic Renal Disease, CKD                Hepatic/GI:  Hepatic/GI Normal                 Musculoskeletal:  Musculoskeletal Normal                Neurological:  Neurology Normal                                      Endocrine:  Diabetes, well controlled, type 2           Dermatological:  Skin Normal    Psych:  Psychiatric Normal                  Physical Exam  General: Well nourished, Cooperative, Alert and Oriented    Airway:  Mallampati: II / II  Mouth Opening: Normal  TM Distance: Normal  Tongue: Normal  Neck ROM: Normal ROM    Dental:  Partial Dentures, Periodontal disease    Anesthesia Plan  Type of Anesthesia, risks & benefits discussed:    Anesthesia Type: Gen ETT  Intra-op Monitoring Plan: Standard ASA Monitors  Post Op Pain Control Plan: multimodal analgesia and IV/PO Opioids PRN  Induction:  IV  Airway Plan: Direct, Post-Induction  Informed Consent: Informed consent signed with the Patient and all parties understand the risks and agree with anesthesia plan.  All questions answered. Patient  consented to blood products? No  ASA Score: 3  Day of Surgery Review of History & Physical: H&P Update referred to the surgeon/provider.    Ready For Surgery From Anesthesia Perspective.   .

## 2023-11-03 NOTE — OP NOTE
DATE OF PROCEDURE: 11/3/2023     PREOPERATIVE DIAGNOSES:   Recurrent epistaxis [R04.0]    POSTOPERATIVE DIAGNOSES:   Recurrent epistaxis [R04.0]    SURGEON:  Surgeon(s) and Role:     * Jono Russell MD - Primary     * Yenifer Sandoval MD - Resident - Assisting    PROCEDURES PERFORMED:   Nasal endoscopy and control of epistaxis.     ANESTHESIA: General    FINDINGS: Diffuse mucosal oozing along the left septum. Prominent pulsatile bleeding along the floor abutting the incisive canal.     INDICATIONS FOR PROCEDURE:   Dariel Urbina is a 83 y.o. well known to me, in brief he is a gentleman with recurrent episodes of epistaxis.  He would undergone bilateral SP ligations and bilateral\ maxillofacial embolizations.  Despite this he has had continued bleeding.  His bleeding has mostly been on the left side along his septum.  He would undergone a previous septoplasty with submucosal resection of his nasal cartilages.  He is a chronic kidney disease patient with a mechanical heart valve and is currently on Coumadin.  He was seen in my clinic 1 day prior to presentation with acute sided left epistaxis.  A rhino rocket was placed an outside ED. the rhino rocket was removed in clinic and pulsatile bleeding was noted along the nasal floor abutting the incisive canal.  He was repacked with Merocel pledgets in order to achieve hemostasis.  Due to the recurrent nature he was advised to undergo revision nasal endoscopy with control of his epistaxis.    He was apprised of the risks, benefits and alternatives to surgery.  In spite of the risk inherent to surgery, he provided informed consent for the aforementioned procedures.     PROCEDURE IN DETAIL:  The patient was taken to the operating room and placed on the operating table in the supine position.  General endotracheal anesthesia was induced by the anesthesia team.     He was prepped and draped in usual sterile fashion.  He was rotated 90° away from anesthesia.  A  preoperative time-out was taken to confirm the patient and procedure being performed.    The bone marrow cells were removed from the patient's left nasal cavity.  The suction Bovie on 20 w was used to assess the left nasal cavity.  There was diffuse mucosal oozing throughout the mucosa of the left nasal cavity.  A more prominent area along the nasal floor abutting the incisive canal was noted to be pulsating.  The suction Bovie was used to cauterize this area to control the bleeding.  Dilute epinephrine soaked pledgets were then placed into the left nasal cavity.  After hemostasis was achieved.  The patient nose was then packed with Posicept nasal packing to assist with healing.    The zero-degree endoscope was then used to assess the right nasal cavity.  There was no evidence of bleeding from the right side.    At this point the procedure was deemed to be complete the patient was then handed over the anesthesia team where he was safely extubated.  He was then transferred to the recovery room where he continued to recovery in a stable condition.    There were no intraoperative complications.  I was present for and participated in the entire procedure as dictated above.     All sponge and needle counts were correct at the end the case x2    ESTIMATED BLOOD LOSS: 10cc    SPECIMENS:   Specimen (24h ago, onward)      None          Jono Russell MD

## 2023-11-06 LAB — POCT GLUCOSE: 115 MG/DL (ref 70–110)

## 2023-11-07 ENCOUNTER — PATIENT MESSAGE (OUTPATIENT)
Dept: GASTROENTEROLOGY | Facility: CLINIC | Age: 83
End: 2023-11-07
Payer: MEDICARE

## 2023-11-07 ENCOUNTER — LAB VISIT (OUTPATIENT)
Dept: LAB | Facility: HOSPITAL | Age: 83
End: 2023-11-07
Attending: INTERNAL MEDICINE
Payer: MEDICARE

## 2023-11-07 ENCOUNTER — ANTI-COAG VISIT (OUTPATIENT)
Dept: CARDIOLOGY | Facility: CLINIC | Age: 83
End: 2023-11-07
Payer: MEDICARE

## 2023-11-07 ENCOUNTER — TELEPHONE (OUTPATIENT)
Dept: ENDOSCOPY | Facility: HOSPITAL | Age: 83
End: 2023-11-07
Payer: MEDICARE

## 2023-11-07 DIAGNOSIS — Z79.01 ANTICOAGULANT LONG-TERM USE: Primary | ICD-10-CM

## 2023-11-07 DIAGNOSIS — Z95.2 H/O MECHANICAL AORTIC VALVE REPLACEMENT: ICD-10-CM

## 2023-11-07 DIAGNOSIS — D50.9 IRON DEFICIENCY ANEMIA, UNSPECIFIED IRON DEFICIENCY ANEMIA TYPE: ICD-10-CM

## 2023-11-07 DIAGNOSIS — E11.51 TYPE 2 DIABETES MELLITUS WITH DIABETIC PERIPHERAL ANGIOPATHY WITHOUT GANGRENE, WITHOUT LONG-TERM CURRENT USE OF INSULIN: ICD-10-CM

## 2023-11-07 DIAGNOSIS — I48.0 PAROXYSMAL ATRIAL FIBRILLATION: ICD-10-CM

## 2023-11-07 DIAGNOSIS — Z79.01 ANTICOAGULANT LONG-TERM USE: ICD-10-CM

## 2023-11-07 LAB
ALBUMIN SERPL BCP-MCNC: 3 G/DL (ref 3.5–5.2)
ALP SERPL-CCNC: 82 U/L (ref 55–135)
ALT SERPL W/O P-5'-P-CCNC: 25 U/L (ref 10–44)
ANION GAP SERPL CALC-SCNC: 8 MMOL/L (ref 8–16)
AST SERPL-CCNC: 36 U/L (ref 10–40)
BILIRUB SERPL-MCNC: 0.3 MG/DL (ref 0.1–1)
BUN SERPL-MCNC: 47 MG/DL (ref 8–23)
CALCIUM SERPL-MCNC: 9.8 MG/DL (ref 8.7–10.5)
CHLORIDE SERPL-SCNC: 110 MMOL/L (ref 95–110)
CO2 SERPL-SCNC: 21 MMOL/L (ref 23–29)
CREAT SERPL-MCNC: 2.3 MG/DL (ref 0.5–1.4)
EST. GFR  (NO RACE VARIABLE): 27.7 ML/MIN/1.73 M^2
ESTIMATED AVG GLUCOSE: 128 MG/DL (ref 68–131)
FERRITIN SERPL-MCNC: 41 NG/ML (ref 20–300)
GLUCOSE SERPL-MCNC: 98 MG/DL (ref 70–110)
HBA1C MFR BLD: 6.1 % (ref 4–5.6)
HGB BLD-MCNC: 8.5 G/DL (ref 14–18)
INR PPP: 2.6 (ref 0.8–1.2)
IRON SERPL-MCNC: 35 UG/DL (ref 45–160)
POTASSIUM SERPL-SCNC: 4.8 MMOL/L (ref 3.5–5.1)
PROT SERPL-MCNC: 6.8 G/DL (ref 6–8.4)
PROTHROMBIN TIME: 26.3 SEC (ref 9–12.5)
SATURATED IRON: 11 % (ref 20–50)
SODIUM SERPL-SCNC: 139 MMOL/L (ref 136–145)
TOTAL IRON BINDING CAPACITY: 321 UG/DL (ref 250–450)
TRANSFERRIN SERPL-MCNC: 217 MG/DL (ref 200–375)

## 2023-11-07 PROCEDURE — 80053 COMPREHEN METABOLIC PANEL: CPT | Mod: HCNC | Performed by: INTERNAL MEDICINE

## 2023-11-07 PROCEDURE — 93793 PR ANTICOAGULANT MGMT FOR PT TAKING WARFARIN: ICD-10-PCS | Mod: S$GLB,,,

## 2023-11-07 PROCEDURE — 85610 PROTHROMBIN TIME: CPT | Mod: HCNC | Performed by: INTERNAL MEDICINE

## 2023-11-07 PROCEDURE — 83036 HEMOGLOBIN GLYCOSYLATED A1C: CPT | Mod: HCNC | Performed by: INTERNAL MEDICINE

## 2023-11-07 PROCEDURE — 84466 ASSAY OF TRANSFERRIN: CPT | Mod: HCNC | Performed by: INTERNAL MEDICINE

## 2023-11-07 PROCEDURE — 93793 ANTICOAG MGMT PT WARFARIN: CPT | Mod: S$GLB,,,

## 2023-11-07 PROCEDURE — 82728 ASSAY OF FERRITIN: CPT | Mod: HCNC | Performed by: INTERNAL MEDICINE

## 2023-11-07 PROCEDURE — 85018 HEMOGLOBIN: CPT | Mod: HCNC | Performed by: INTERNAL MEDICINE

## 2023-11-07 PROCEDURE — 36415 COLL VENOUS BLD VENIPUNCTURE: CPT | Mod: HCNC | Performed by: INTERNAL MEDICINE

## 2023-11-07 PROCEDURE — 83540 ASSAY OF IRON: CPT | Mod: HCNC | Performed by: INTERNAL MEDICINE

## 2023-11-07 NOTE — PROGRESS NOTES
Brenda - please schedule Dariel for GI clinic apt with me next week to discuss his anemia and EGD and colonoscopy for further evaluation.

## 2023-11-10 ENCOUNTER — OFFICE VISIT (OUTPATIENT)
Dept: OTOLARYNGOLOGY | Facility: CLINIC | Age: 83
End: 2023-11-10
Payer: MEDICARE

## 2023-11-10 VITALS
SYSTOLIC BLOOD PRESSURE: 144 MMHG | HEART RATE: 55 BPM | WEIGHT: 170.44 LBS | DIASTOLIC BLOOD PRESSURE: 63 MMHG | BODY MASS INDEX: 27.51 KG/M2

## 2023-11-10 DIAGNOSIS — R04.0 RECURRENT EPISTAXIS: ICD-10-CM

## 2023-11-10 DIAGNOSIS — J34.2 NASAL SEPTAL DEVIATION: Primary | ICD-10-CM

## 2023-11-10 DIAGNOSIS — Z79.01 ANTICOAGULANT LONG-TERM USE: ICD-10-CM

## 2023-11-10 DIAGNOSIS — N18.32 STAGE 3B CHRONIC KIDNEY DISEASE: ICD-10-CM

## 2023-11-10 PROCEDURE — 1159F MED LIST DOCD IN RCRD: CPT | Mod: HCNC,CPTII,S$GLB, | Performed by: STUDENT IN AN ORGANIZED HEALTH CARE EDUCATION/TRAINING PROGRAM

## 2023-11-10 PROCEDURE — 3077F PR MOST RECENT SYSTOLIC BLOOD PRESSURE >= 140 MM HG: ICD-10-PCS | Mod: HCNC,CPTII,S$GLB, | Performed by: STUDENT IN AN ORGANIZED HEALTH CARE EDUCATION/TRAINING PROGRAM

## 2023-11-10 PROCEDURE — 99999 PR PBB SHADOW E&M-EST. PATIENT-LVL III: ICD-10-PCS | Mod: PBBFAC,HCNC,, | Performed by: STUDENT IN AN ORGANIZED HEALTH CARE EDUCATION/TRAINING PROGRAM

## 2023-11-10 PROCEDURE — 31231 PR NASAL ENDOSCOPY, DX: ICD-10-PCS | Mod: HCNC,S$GLB,, | Performed by: STUDENT IN AN ORGANIZED HEALTH CARE EDUCATION/TRAINING PROGRAM

## 2023-11-10 PROCEDURE — 99213 PR OFFICE/OUTPT VISIT, EST, LEVL III, 20-29 MIN: ICD-10-PCS | Mod: 25,HCNC,S$GLB, | Performed by: STUDENT IN AN ORGANIZED HEALTH CARE EDUCATION/TRAINING PROGRAM

## 2023-11-10 PROCEDURE — 3078F DIAST BP <80 MM HG: CPT | Mod: HCNC,CPTII,S$GLB, | Performed by: STUDENT IN AN ORGANIZED HEALTH CARE EDUCATION/TRAINING PROGRAM

## 2023-11-10 PROCEDURE — 3077F SYST BP >= 140 MM HG: CPT | Mod: HCNC,CPTII,S$GLB, | Performed by: STUDENT IN AN ORGANIZED HEALTH CARE EDUCATION/TRAINING PROGRAM

## 2023-11-10 PROCEDURE — 1159F PR MEDICATION LIST DOCUMENTED IN MEDICAL RECORD: ICD-10-PCS | Mod: HCNC,CPTII,S$GLB, | Performed by: STUDENT IN AN ORGANIZED HEALTH CARE EDUCATION/TRAINING PROGRAM

## 2023-11-10 PROCEDURE — 3072F LOW RISK FOR RETINOPATHY: CPT | Mod: HCNC,CPTII,S$GLB, | Performed by: STUDENT IN AN ORGANIZED HEALTH CARE EDUCATION/TRAINING PROGRAM

## 2023-11-10 PROCEDURE — 99213 OFFICE O/P EST LOW 20 MIN: CPT | Mod: 25,HCNC,S$GLB, | Performed by: STUDENT IN AN ORGANIZED HEALTH CARE EDUCATION/TRAINING PROGRAM

## 2023-11-10 PROCEDURE — 31231 NASAL ENDOSCOPY DX: CPT | Mod: HCNC,S$GLB,, | Performed by: STUDENT IN AN ORGANIZED HEALTH CARE EDUCATION/TRAINING PROGRAM

## 2023-11-10 PROCEDURE — 99999 PR PBB SHADOW E&M-EST. PATIENT-LVL III: CPT | Mod: PBBFAC,HCNC,, | Performed by: STUDENT IN AN ORGANIZED HEALTH CARE EDUCATION/TRAINING PROGRAM

## 2023-11-10 PROCEDURE — 3072F PR LOW RISK FOR RETINOPATHY: ICD-10-PCS | Mod: HCNC,CPTII,S$GLB, | Performed by: STUDENT IN AN ORGANIZED HEALTH CARE EDUCATION/TRAINING PROGRAM

## 2023-11-10 PROCEDURE — 3078F PR MOST RECENT DIASTOLIC BLOOD PRESSURE < 80 MM HG: ICD-10-PCS | Mod: HCNC,CPTII,S$GLB, | Performed by: STUDENT IN AN ORGANIZED HEALTH CARE EDUCATION/TRAINING PROGRAM

## 2023-11-10 PROCEDURE — 1126F AMNT PAIN NOTED NONE PRSNT: CPT | Mod: HCNC,CPTII,S$GLB, | Performed by: STUDENT IN AN ORGANIZED HEALTH CARE EDUCATION/TRAINING PROGRAM

## 2023-11-10 PROCEDURE — 1126F PR PAIN SEVERITY QUANTIFIED, NO PAIN PRESENT: ICD-10-PCS | Mod: HCNC,CPTII,S$GLB, | Performed by: STUDENT IN AN ORGANIZED HEALTH CARE EDUCATION/TRAINING PROGRAM

## 2023-11-10 NOTE — PROGRESS NOTES
Subjective:      Dariel is a 83 y.o. male who comes for follow-up of  epistaxis .  His last visit with me was on 11/2/2023.  He underwent endoscopy nasal cautery on 11/3/23. Has been hemostatic since. Received 1 U PRBCs after case. Seeing cardiologist later this month.     His current sinus regime consists of: Nasal saline.     The assessment of quality and severity of symptoms as measured by the SNOT-22 score is deferred.    The patient's medications, allergies, past medical, surgical, social and family histories were reviewed and updated as appropriate.    A detailed review of systems was obtained with pertinent positives as per the above HPI, and otherwise negative.        Objective:     BP (!) 144/63 (BP Location: Left arm, Patient Position: Sitting, BP Method: Small (Automatic))   Pulse (!) 55   Wt 77.3 kg (170 lb 6.7 oz)   BMI 27.51 kg/m²        Constitutional:   Vital signs are normal. He appears well-developed and well-nourished.     Head:  Normocephalic and atraumatic.     Ears:  Hearing normal to normal and whispered voice; external ear normal without scars, lesions, or masses; ear canal, tympanic membrane, and middle ear normal..   Right Ear: No swelling. Tympanic membrane is not perforated and not bulging. No middle ear effusion.   Left Ear: No swelling. Tympanic membrane is not perforated and not bulging.  No middle ear effusion.     Nose:  Nose normal including turbinates, nasal mucosa, sinuses and nasal septum. No epistaxis.     Mouth/Throat  Oropharynx clear and moist without lesions or asymmetry and normal uvula midline. Normal dentition. No tonsillar abscesses. Tonsillar exudate.      Neck:  Neck normal without thyromegaly masses, asymmetry, normal tracheal structure, crepitus, and tenderness, thyroid normal, trachea normal, phonation normal, full range of motion with neck supple and no adenopathy. No stridor present.        Head (right side): No submental adenopathy present.        Head  "(left side): No submental adenopathy present.     He has no cervical adenopathy.     Pulmonary/Chest:   No stridor.       Procedure    Nasal endoscopy performed.  See procedure note.    Nasal Endoscopy:  11/10/2023    The use of diagnostic nasal endoscopy was considered medically necessary for the evaluation and visualization of the nasal anatomy for symptoms suggestive of nasal or sinus origin. Physical examination (including a nasal speculum evaluation) did not provide sufficient clinical information to establish a diagnosis, or symptoms did not improve or worsened following treatment.     The nasal cavity was decongested with topical 1% phenylephrine and anesthetized with 4% lidocaine.  A rigid 0-degree endoscope was introduced into the nasal cavity.    The patient was seated in the examination chair. After discussion of risks and benefits, a nasal endoscope was inserted into the nose the endoscope was passed along the left nasal floor to the nasopharynx. It was then passed between the middle and superior meatus, nasal turbinates, nasal septum, nasopharynx and sphenoethmoid region. The nasal endoscope was withdrawn and there was no complications. An identical procedure was performed on the right side. I was present for the entire procedure.The patient tolerated the above procedure well. The findings of this procedure can be found in the dictated note from 11/10/2023 visit.      Appropriate healing of left septum. No active bleeding. Crusts removed from nasal cavity.     Data Reviewed    WBC (K/uL)   Date Value   11/03/2023 5.17     Eosinophil % (%)   Date Value   11/03/2023 1.4     Eos # (K/uL)   Date Value   11/03/2023 0.1     Platelets (K/uL)   Date Value   11/03/2023 177     Glucose (mg/dL)   Date Value   11/07/2023 98     No results found for: "IGE"    No sinus imaging available.      Assessment:     1. Nasal septal deviation    2. Recurrent epistaxis    3. Anticoagulant long-term use    4. Stage 3b chronic " kidney disease         Plan:     - Cont saline  - RTC 1 month    Jono Russell MD

## 2023-11-15 ENCOUNTER — OFFICE VISIT (OUTPATIENT)
Dept: GASTROENTEROLOGY | Facility: CLINIC | Age: 83
End: 2023-11-15
Payer: MEDICARE

## 2023-11-15 DIAGNOSIS — R04.0 EPISTAXIS: ICD-10-CM

## 2023-11-15 DIAGNOSIS — Z79.01 CURRENT USE OF LONG TERM ANTICOAGULATION: ICD-10-CM

## 2023-11-15 DIAGNOSIS — R13.10 DYSPHAGIA, UNSPECIFIED TYPE: Primary | ICD-10-CM

## 2023-11-15 DIAGNOSIS — D50.9 IRON DEFICIENCY ANEMIA, UNSPECIFIED IRON DEFICIENCY ANEMIA TYPE: ICD-10-CM

## 2023-11-15 PROCEDURE — 99214 PR OFFICE/OUTPT VISIT, EST, LEVL IV, 30-39 MIN: ICD-10-PCS | Mod: HCNC,95,, | Performed by: INTERNAL MEDICINE

## 2023-11-15 PROCEDURE — 3072F PR LOW RISK FOR RETINOPATHY: ICD-10-PCS | Mod: HCNC,CPTII,95, | Performed by: INTERNAL MEDICINE

## 2023-11-15 PROCEDURE — 99214 OFFICE O/P EST MOD 30 MIN: CPT | Mod: HCNC,95,, | Performed by: INTERNAL MEDICINE

## 2023-11-15 PROCEDURE — 3072F LOW RISK FOR RETINOPATHY: CPT | Mod: HCNC,CPTII,95, | Performed by: INTERNAL MEDICINE

## 2023-11-15 NOTE — Clinical Note
Brenda please refer Dariel to Heme-Onc for evaluation of IV iron Please refer patient to speech pathology for evaluation of dysphagia Please refer patient to Dr. Kiser in ENT for dysphagia Return to me 4-6 weeks for follow-up

## 2023-11-16 ENCOUNTER — PATIENT MESSAGE (OUTPATIENT)
Dept: GASTROENTEROLOGY | Facility: CLINIC | Age: 83
End: 2023-11-16
Payer: MEDICARE

## 2023-11-16 ENCOUNTER — TELEPHONE (OUTPATIENT)
Dept: SPEECH THERAPY | Facility: HOSPITAL | Age: 83
End: 2023-11-16

## 2023-11-16 DIAGNOSIS — N18.4 CHRONIC RENAL DISEASE, STAGE IV: Primary | ICD-10-CM

## 2023-11-16 DIAGNOSIS — R13.10 DYSPHAGIA, UNSPECIFIED TYPE: ICD-10-CM

## 2023-11-16 NOTE — PROGRESS NOTES
The patient location is:  At home  The chief complaint leading to consultation is:  Dysphagia recurrent nosebleed    Visit type: audiovisual    Face to Face time with patient: 30  minutes of total time spent on the encounter, which includes face to face time and non-face to face time preparing to see the patient (eg, review of tests), Obtaining and/or reviewing separately obtained history, Documenting clinical information in the electronic or other health record, Independently interpreting results (not separately reported) and communicating results to the patient/family/caregiver, or Care coordination (not separately reported).       Each patient to whom he or she provides medical services by telemedicine is:  (1) informed of the relationship between the physician and patient and the respective role of any other health care provider with respect to management of the patient; and (2) notified that he or she may decline to receive medical services by telemedicine and may withdraw from such care at any time.    Notes:         [facility] Consultation Note    Reason for Consult:  The encounter diagnosis was Dysphagia, unspecified type.    PCP:   Devon Langston Jr.       Referring MD:  No referring provider defined for this encounter.    Initial History of Present Illness (HPI):  This is a 83 y.o. male here for evaluation of iron deficiency anemia which presumed from his nose bleeding now had a colonoscopy not too long ago did have an adenomas colon polyp he should have another colonoscopy 5 years from his last for surveillance he was never able to get his EGD for further evaluation of iron deficiency anemia because of recurrent nose bleed he recently has had 2 surgeries for his nosebleed last 3 weeks she is received blood transfusion been admitted to the hospital he is on able to get off his Coumadin his wife says because of his heart valve he has weight loss he chokes when he drinks water or tries  to eat he has not followed up with ENT for the swallowing issues he is unable to get an EGD is wife says because he is too weak and he is had too much sedation for evaluation they would like referral to ENT swallowing clinic maybe speech pathology and a modified barium swallow for further evaluation the let me know when they feel like he can safely tolerate in EGD with sedation.      Abdominal pain - no  Reflux - possible  Dysphagia - has a   Bowel habits -2 well-formed bowel movements a day no blood  GI bleeding - none but having nosebleed followed by ENT blood transfusion for this admitted to the hospital  NSAID usage -on Coumadin     Interval HPI 10/19/2022:  The patient's last visit with me was on Visit date not found.        ROS:  Constitutional: No fevers, chills, about a 29 lb weight loss over the last several months  ENT:  No heartburn as above dysphagia no odynophagia no hoarseness  CV: No chest pain, no palpitation  Pulm: No cough, chronic shortness of breath   Ophtho: No vision changes  GI: see HPI  Derm: No rash, no itching  Heme: No lymphadenopathy, No easy bruising  MSK:  Mild arthritis  : No dysuria, No hematuria  Endo: No hot or cold intolerance  Neuro: No syncope, No seizure, no strokes  Psych: No uncontrolled anxiety, No uncontrolled depression  Interval HPI 11/15/2023:  The patient's last visit with me was on 10/19/2022.      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 without angina pectoris (09/11/2012), Bilateral carotid artery disease (02/09/2017), Bleeding from the nose, Bleeding nose (03/21/2018), Cancer, Cataract, CHF (congestive heart failure), CKD (chronic kidney disease) stage 3, GFR 30-59 ml/min (05/27/2015), Claudication of left lower extremity (09/17/2014), Colon polyp, Encounter for blood transfusion, Gastroesophageal  reflux disease without esophagitis (03/19/2018), Gastrointestinal hemorrhage associated with intestinal diverticulosis (04/01/2018), Glaucoma, H/O mechanical aortic valve replacement (09/17/2014), History of gout (09/26/2012), Hyperparathyroidism due to renal insufficiency (07/27/2015), Internal hemorrhoid (04/03/2018), Long term current use of anticoagulant therapy (09/26/2012), Mechanical heart valve present, Metabolic acidosis with normal anion gap and bicarbonate losses (03/20/2018), Mixed hyperlipidemia (09/26/2012), NSTEMI (non-ST elevated myocardial infarction) (03/21/2018), Obesity, diabetes, and hypertension syndrome (02/23/2016), Paroxysmal atrial fibrillation (02/06/2023), PVD (peripheral vascular disease) (09/11/2012), Renovascular hypertension (09/26/2012), 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    Surgical History:  has a past surgical history that includes Cardiac valuve replacement; Cardiac catheterization; Coronary artery bypass graft; Coronary angioplasty; Cardiac valve replacement; Spine surgery; Vasectomy; Colon surgery; Colonoscopy (N/A, 03/31/2017); Hernia repair; Colonoscopy (N/A, 04/03/2018); Colonoscopy (N/A, 08/13/2018); Carpal tunnel release (Right, 05/19/2020); Coronary angiography (N/A, 10/04/2021); Coronary bypass graft angiography (10/04/2021); Phacoemulsification of cataract (Left, 11/13/2022); Intraocular prosthesis insertion (Left, 11/13/2022); Cataract extraction (Left, 11/13/2022); Phacoemulsification of cataract (Right, 12/04/2022); Intraocular prosthesis insertion (Right, 12/04/2022); Eye surgery; Back surgery; Coronary angiography (N/A, 3/2/2023); Coronary bypass graft angiography (3/2/2023); Transesophageal echocardiography (N/A,  3/22/2023); Transesophageal echocardiography (N/A, 4/11/2023); echocardiogram,transesophageal (N/A, 4/14/2023); Functional endoscopic sinus surgery (FESS) (Bilateral, 6/14/2023); ligation, artery, sphenopalatine, endoscopic (Bilateral, 6/14/2023); and Endoscopic nasal cauterization (Bilateral, 11/3/2023).    Family History: family history includes Alcohol abuse in his father; Diabetes in his brother; Heart disease in his brother, father, mother, and sister; Heart failure in his brother, father, and mother; No Known Problems in his maternal aunt, maternal grandfather, maternal grandmother, maternal uncle, paternal aunt, paternal grandfather, paternal grandmother, paternal uncle, and sister..     Social History:  reports that he quit smoking about 43 years ago. His smoking use included cigarettes. He started smoking about 63 years ago. He has a 20.0 pack-year smoking history. He has never been exposed to tobacco smoke. He has never used smokeless tobacco. He reports that he does not drink alcohol and does not use drugs.    Review of patient's allergies indicates:   Allergen Reactions    Lisinopril     Losartan      Intolerance- elevates potassium level       Medication List with Changes/Refills   Current Medications    ACETAMINOPHEN (TYLENOL) 500 MG TABLET    Take 500 mg by mouth daily as needed for Pain.    ALLOPURINOL (ZYLOPRIM) 100 MG TABLET    Take 1 tablet (100 mg total) by mouth once daily.    AMOXICILLIN-CLAVULANATE 875-125MG (AUGMENTIN) 875-125 MG PER TABLET    Take 1 tablet by mouth every 12 (twelve) hours.    BUMETANIDE (BUMEX) 1 MG TABLET    Take 1 tablet (1 mg total) by mouth every other day.    DIPHENHYDRAMINE (BENADRYL) 25 MG CAPSULE    Take 50 mg by mouth nightly as needed for Insomnia.    FERROUS GLUCONATE (FERGON) 324 MG TABLET    Take 1 tablet (324 mg total) by mouth daily with breakfast.    ISOSORBIDE MONONITRATE (IMDUR) 60 MG 24 HR TABLET    Take 1 tablet (60 mg total) by mouth every evening.     LOPERAMIDE (IMODIUM) 2 MG CAPSULE    Take 2 mg by mouth 4 (four) times daily as needed for Diarrhea.    METOPROLOL SUCCINATE (TOPROL-XL) 25 MG 24 HR TABLET    Take 1 tablet (25 mg total) by mouth once daily.    OMEGA-3 FATTY ACIDS/FISH OIL (FISH OIL-OMEGA-3 FATTY ACIDS) 300-1,000 MG CAPSULE    Take 1 capsule by mouth once daily.    OXYMETAZOLINE (AFRIN) 0.05 % NASAL SPRAY    Use 2 sprays by Nasal route daily as needed (nose bleed).    ROSUVASTATIN (CRESTOR) 40 MG TAB    TAKE 1 TABLET EVERY EVENING.    WARFARIN (COUMADIN) 5 MG TABLET    Take warfarin 7.5mg (1.5 tablets) PO Tuesday & Saturday, then take 5mg PO all other days or as instructed by Coumadin Clinic         Objective Findings:    Vital Signs:  There were no vitals taken for this visit.  There is no height or weight on file to calculate BMI.    Physical Exam:  Telemedicine video visit  General Appearance: Well appearing in no acute distress  Neurologic:  Alert       Labs:  Lab Results   Component Value Date    WBC 5.17 11/03/2023    HGB 8.5 (L) 11/07/2023    HCT 23.2 (L) 11/03/2023     11/03/2023    CHOL 115 (L) 02/28/2023    TRIG 155 (H) 02/28/2023    HDL 37 (L) 02/28/2023    ALT 25 11/07/2023    AST 36 11/07/2023     11/07/2023    K 4.8 11/07/2023     11/07/2023    CREATININE 2.3 (H) 11/07/2023    BUN 47 (H) 11/07/2023    CO2 21 (L) 11/07/2023    TSH 3.705 09/29/2022    PSA 0.35 07/27/2012    INR 2.6 (H) 11/07/2023    HGBA1C 6.1 (H) 11/07/2023         Medical Decision Making:   The Olympus scope CF-TC011X ([id number]) was                         introduced through the anus and advanced to the                         cecum, identified by appendiceal orifice and                         ileocecal valve. The colonoscopy was performed                         without difficulty. The patient tolerated the                         procedure well. The quality of the bowel                         preparation was adequate. The ileocecal valve,                          appendiceal orifice, and rectum were photographed.   Findings:       Hemorrhoids were found on perianal exam.        A few small-mouthed diverticula were found in the descending colon.        There was no evidence of diverticular bleeding.        A 9 mm polyp was found in the splenic flexure. The polyp was        sessile. The polyp was removed with a cold snare. Resection and        retrieval were complete.        The exam was otherwise without abnormality on direct and        retroflexion views.   Impression:           - Hemorrhoids found on perianal exam.                         - Mild diverticulosis in the descending colon.                         There was no evidence of diverticular bleeding.                         - One 9 mm polyp at the splenic flexure, removed                         with a cold snare. Resected and retrieved. THis was                         located near the prior tattoo site                         - The examination was otherwise normal on direct                         and retroflexion views.   Recommendation:       - Discharge patient to home.                         - Resume previous diet.                         - Continue present medications.                         - Await pathology results.                         - Repeat colonoscopy is not recommended based on age   Attending Participation:        I personally performed the entire procedure.   Kam Barba MD   8/13/2018       Assessment:  1. Dysphagia, unspecified type    2.      Recurrent epistaxis on Coumadin  3.      Iron deficiency anemia from nosebleed  4.      Weight loss from dysphagia  5.      History of adenomas colon polyps     Recommendations:  1. Patient does not think he can get sedated for an EGD now he has been back and forth to the hospital for uncontrolled knows bleedings received blood transfusions for it he is had 2 surgeries in the past 3 weeks he is on Coumadin for an aortic heart valve.  2.  Weight loss from not eating with choking with liquids dysphagia high up in the neck refer to ENT for further evaluation and speech therapy for a modified barium swallow evaluation  3. History of adenomas colon polyp by advanced endoscopy  4. Return to GI clinic telemedicine video visit 4-6 weeks for follow-up     No follow-ups on file.      Order summary:  Orders Placed This Encounter    Fl Modified Barium Swallow Speech    Ambulatory referral/consult to ENT    Ambulatory referral/consult to Speech Therapy         Thank you so much for allowing me to participate in the care of Dariel Ayon MD    DISCLAIMER: This note was prepared with vozero voice recognition transcription software. Garbled syntax, mangled or inadvertent pronouns, and other bizarre constructions may be attributed to that software system. While efforts were made to correct any mistakes made by this voice recognition program, some errors and/or omissions may remain in the note that were missed when the note was originally created.'

## 2023-11-20 ENCOUNTER — TELEPHONE (OUTPATIENT)
Dept: REHABILITATION | Facility: HOSPITAL | Age: 83
End: 2023-11-20
Payer: MEDICARE

## 2023-11-20 NOTE — TELEPHONE ENCOUNTER
Left voice message on patient's wife phone in regards to the patient's speech therapy appointment tomorrow at 10:30. Informed wife that I am waiting for clarification from Dr. Ayon about which type of swallowing test he wants Dariel to have. I can only do a clinical swallow evaluation at my office. However I think that Dr. Ayon wants a Modified Barium Swallow Study which is completed in outpatient radiology with the Speech-Language Pathologist. I can not do the Modified Barium Swallow Study at my office. I will try to call the patient in the morning to clarify which test the physician wants.     LELE Shaw., CCC-SLP, CBIS  Speech-Language Pathologist  Certified Brain Injury Specialist

## 2023-11-21 ENCOUNTER — CLINICAL SUPPORT (OUTPATIENT)
Dept: REHABILITATION | Facility: HOSPITAL | Age: 83
End: 2023-11-21
Attending: INTERNAL MEDICINE
Payer: MEDICARE

## 2023-11-21 DIAGNOSIS — R13.10 DYSPHAGIA, UNSPECIFIED TYPE: ICD-10-CM

## 2023-11-21 PROCEDURE — 92610 EVALUATE SWALLOWING FUNCTION: CPT | Mod: HCNC,PO

## 2023-11-21 NOTE — PATIENT INSTRUCTIONS
Speech Therapy Recommendations   - Modified Barium Swallow Study to further assess swallowing function.

## 2023-11-21 NOTE — PLAN OF CARE
"[facility]  Speech Therapy Evaluation -Dysphagia    Date: 11/21/2023     Name: Dariel Urbina   MRN: [id number]    Therapy Diagnosis:   Encounter Diagnosis   Name Primary?    Dysphagia, unspecified type     Physician: Sergei Ayon MD  Physician Orders: Ambulatory Referral to Speech Therapy   Medical Diagnosis: R13.10 (ICD-10-CM) - Dysphagia, unspecified type     Visit # / Visits Authorized:  1 / 1   Date of Evaluation:  11/21/2023   Insurance Authorization Period: 11/15/2023 to 11/14/2024  Plan of Care Certification:    11/21/2023 to 12/22/2023      Time In:10:30 am   Time Out: 11:15 am   Total time: 45 minutes    Procedure   Swallow and Oral Function Evaluation        Precautions: Standard, Dysphagia, and Aspiration  Subjective   Date of Onset: 11/15/2023  History of Current Condition:  Dariel Urbina is a 83 y.o. male who presents to [facility] Out[facility] Speech [facility] for evaluation secondary to dysphagia. Patient was referred to therapy by Sergei Ayon MD , which is the patient's gastroenterologist. Patient reports he gets choked up when  swallowing liquids especially that are cold, often resulting in having to spit the liquid out of his mouth. "It won't go down." He reports no problems with pills. Wife reports noticing his swallowing difficulties also includes foods. Patient stated he "wants help with swallowing".   Patient was accompanied to the evaluation by his wife.   Past Medical History: Dariel Urbina  has a past medical history of Anemia of chronic renal failure, stage 3 (moderate) (05/27/2015), Anticoagulant long-term use, Atherosclerosis of coronary artery bypass graft of native heart without angina pectoris (09/11/2012), Bilateral carotid artery disease (02/09/2017), Bleeding from the nose, Bleeding nose (03/21/2018), Cancer, Cataract, CHF (congestive heart failure), CKD (chronic kidney disease) stage 3, GFR 30-59 ml/min (05/27/2015), " Claudication of left lower extremity (09/17/2014), Colon polyp, Encounter for blood transfusion, Gastroesophageal reflux disease without esophagitis (03/19/2018), Gastrointestinal hemorrhage associated with intestinal diverticulosis (04/01/2018), Glaucoma, H/O mechanical aortic valve replacement (09/17/2014), History of gout (09/26/2012), Hyperparathyroidism due to renal insufficiency (07/27/2015), Internal hemorrhoid (04/03/2018), Long term current use of anticoagulant therapy (09/26/2012), Mechanical heart valve present, Metabolic acidosis with normal anion gap and bicarbonate losses (03/20/2018), Mixed hyperlipidemia (09/26/2012), NSTEMI (non-ST elevated myocardial infarction) (03/21/2018), Obesity, diabetes, and hypertension syndrome (02/23/2016), Paroxysmal atrial fibrillation (02/06/2023), PVD (peripheral vascular disease) (09/11/2012), Renovascular hypertension (09/26/2012), 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  Dariel Urbina  has a past surgical history that includes Cardiac valuve replacement; Cardiac catheterization; Coronary artery bypass graft; Coronary angioplasty; Cardiac valve replacement; Spine surgery; Vasectomy; Colon surgery; Colonoscopy (N/A, 03/31/2017); Hernia repair; Colonoscopy (N/A, 04/03/2018); Colonoscopy (N/A, 08/13/2018); Carpal tunnel release (Right, 05/19/2020); Coronary angiography (N/A, 10/04/2021); Coronary bypass graft angiography (10/04/2021); Phacoemulsification of cataract (Left, 11/13/2022); Intraocular prosthesis insertion (Left, 11/13/2022); Cataract extraction (Left, 11/13/2022); Phacoemulsification of cataract (Right, 12/04/2022); Intraocular prosthesis insertion (Right, 12/04/2022); Eye surgery; Back surgery; Coronary  "angiography (N/A, 3/2/2023); Coronary bypass graft angiography (3/2/2023); Transesophageal echocardiography (N/A, 3/22/2023); Transesophageal echocardiography (N/A, 4/11/2023); echocardiogram,transesophageal (N/A, 4/14/2023); Functional endoscopic sinus surgery (FESS) (Bilateral, 6/14/2023); ligation, artery, sphenopalatine, endoscopic (Bilateral, 6/14/2023); and Endoscopic nasal cauterization (Bilateral, 11/3/2023).  Medical Hx and Allergies: Dariel has a current medication list which includes the following prescription(s): acetaminophen, allopurinol, amoxicillin-clavulanate 875-125mg, bumetanide, diphenhydramine, ferrous gluconate, isosorbide mononitrate, loperamide, metoprolol succinate, fish oil-omega-3 fatty acids, oxymetazoline, rosuvastatin, and warfarin, and the following Facility-Administered Medications: ceFAZolin 2 g in dextrose 5 % in water (D5W) 50 mL IVPB (MB+), haloperidol lactate, hydromorphone, and sodium chloride 0.9%.   Review of patient's allergies indicates:   Allergen Reactions    Lisinopril     Losartan      Intolerance- elevates potassium level     Prior Therapy:  None  Social History:  Patient lives with his wife in [place], [place]. Patient is currently driving.  Occupation:  Retired  Prior Level of Function: Independent   Current Level of Function: Independent  Pain Scale: no pain indicated throughout session  Patient's Therapy Goals:  "I need help with swallowing."  Objective   Formal Assessment:    EAT-10 Score:    Eating Assessment Tool (EAT-10) is a questionnaire given to the patient to  his swallowing function.     Key: 0= no problem; 4= severe problem  1. My swallowing problem has caused me to lose weight. - 0  2. My swallowing problem interferes with my ability to go out for meals. - 0  3. Swallowing liquids takes extra effort. - 3  4. Swallowing solids takes extra effort. - 4  5. Swallowing pills takes extra effort. - 0  6. Swallowing is painful. - 0  7. The pleasure of eating is " affected by my swallowing. - 3  8. When I swallow food sticks in my throat. - 3  9. I cough when I eat. - 4  10. Swallowing is stressful. - 0    Dariel ranked his swallowing function as a 17/40. Patient reported that liquids gets stuck and he coughs it up.      Interpretation: Score of greater than 3 is indicative of a swallowing disorder (Lin et al., 2008); higher scores correlate with increased penetration-aspiration  scale scores, and scores greater than 15 results in the patient being 2.2 times more likely to aspirate (Nathan et al., 2015)    References:   GLENDA Ceballos., AILEEN Ling., GILMAR Pyle., RENETTA Salas., Nina, ESTELITA FRANCIS., RENETTA Webb, & MAITE Altamirano. (2008). Validity and reliability of the Eating Assessment Tool (EAT-10). Annals of Otology, Rhinology & Laryngology, 117(12), 919-924. https://doi.org/10.1177/[email or web address]  AILEEN Evans., JACQUELYN Patterson., RENETTA Francis., Delta, M. A., & GLENDA Ceballos. (2015). The ability of the 10-item eating assessment tool (EAT-10) to predict aspiration risk in persons with dysphagia. Annals of Otology, Rhinology & Laryngology, 124(5), 351-354. https://doi.org/10.1177/[email or web address]        Clinical Swallow Exam/ Cranial Nerve Exam:    Cranial Nerve 5: Trigeminal Nerve  Motor Jaw Posture at rest: Closed  Mandible Elevation/Depression: WFL  Mandible lateralization: WFL  Abnormal movement: absent Interpretation: Within functional limits   Sensory Forehead: WFL  Cheek: WFL  Jaw: WFL  Facial Pain: None noted Interpretation: Within functional limits     Cranial Nerve 7: Facial Nerve  Motor Facial Symmetry:  WNL  Wrinkle Forehead: WFL  Close eyes tightly: WFL  Labial Protrusion: WFL  Labial Retraction: WFL  Labial alternating movement: WFL  Cheek puffing sustained: WFL  Cheek puffing against resistance: WFL  Abnormal movement: absent Interpretation: Within functional limits   Sensory Formal testing not completed. Pt denied any changes in taste      Cranial Nerves IX and  X: Glossopharyngeal and Vagus Nerves  Motor Palatal Symmetry (Rest): WNL  Palatal Symmetry: (Movement) WNL  Palatal Elevation (Movement): WNL   Palatal Elevation (Sustained): WNL  Cough: Perceptually strong  Voice Prior to PO intake: Clear  Resonance: Normal  Abnormal movement: absent Interpretation: Within functional limits     Cranial Nerve XII: Hypoglossal Nerve  Motor Tongue at rest: WNL  Lingual Protrusion: WNL  Lingual Protrusion against Resistance: WNL  Lingual Lateralization: WNL  Abnormal movement: absent Interpretation: Within functional limit     Other information:  Volitional Swallow: Able to palpate laryngeal rise  Volitional Cough: Strong  Mucosal Quality: No abnormal findings  Secretion Management: Within functional limits  Dentition: Scattered Dentition and Teeth in poor condition. Partial denture (bottom front portion of mouth.  Structure Abnormalities: No   Voice Prior to PO Intake: Clear      Hyndman Swallow Protocol:  The Hyndman Swallow Protocol was administered. The patient was alert and provided the instructions prior to the beginning of the protocol. The patient consumed 3 oz of water before putting the cup down. Patient drank with consecutive swallows. Patient with no cough, throat clear, or wet vocal quality. Patient without overt signs or symptoms of penetration/aspiration. Patient passed the screener.    Hyndman Swallow Protocol dictates pt remain NPO if fail screener; (Lawr et al. 2014) however, as objective swallow assessment is not available for greater than a week, pt will remain on current diet until objective assessment is completed unless otherwise indicated.      PO Trials:   Pt presented with:   THIN:-3 oz water test via open cup, self regulated sip from cup  PUREE:- tsp bites of applesauce x2  DENTAL SOFT:- tsp bites of peaches x2  SOLID: -bite of shayy cracker x2; peaches x 2    Interpretation: Oral phase: The patient had no anterior loss of the bolus with adequate closure of the  lips around the open cup or spoon. No residue remained in the oral cavity following the swallow.     Pharyngeal phase: Patient  without overt clinical signs/symptoms of aspiration on any PO trials given. Laryngeal elevation palpated.     Recommend Modified Barium Swallow Study (MBSS) or Fiberoptic Endoscopic Evaluation of Swallowing (FEES): Yes based on patient report.       Treatment   No treatment provided today.    Education provided:   -role of Speech Therapy, goals/plan of care, scheduling/cancellations, insurance limitations with patient  -Additional Education provided:   Modified Barium Swallow Study (MBSS)    Patient and family members expressed understanding.     Home Program: Not yet established  Assessment     Dariel presents to [facility] status post medical diagnosis of dysphagia.     Interpretation of objective assessment:   Clinical Swallow Evaluation: Oral phase:The patient had no anterior loss of the bolus with good closure of the lips around the spoon and cup. No residue remained in the oral cavity following the swallow. Pharyngeal phase:  Laryngeal elevation was palpated. Patient without overt clinical sign/symptoms of aspiration. Patient's presents with a possibly inefficient swallow through reports of food and liquids getting stuck often resulting in the spitting out of liquids. Patient did not report globus sensation on trials of cracker and peaches during this session.     Clinical signs of oropharyngeal dysphagia related to patient and wife reports of food and liquid getting stuck when attempting to swallow. Swallowing prognosis is Good to fair due to medical history. Instrumental swallow study is indicated to assess the swallowing physiology and rule out aspiration of various consistencies. Given mild severity, patient should continue current diet prior to instrumental study.      Demonstrates impairments including limitations as described in the problem list.     Positive  prognostic factors: Patient motivation  Negative prognostic factors: complex medical history  Barriers to therapy: No barriers to therapy identified.     Patient's spiritual, cultural, and educational needs considered and patient agreeable to plan of care and goals.    Patient will benefit from skilled therapy depending on results of Modified Barium Swallow Study    Rehab Potential: fair    Short Term Goals: (2 weeks) Current Progress:   Patient will participate in a Modified Barium Swallow Study. Specific goals will depend on results of swallowing test.     Progressing/ Not Met 11/21/2023   Established this date       Long Term Goals: (4 weeks) Current Progress:   Patient will maintain adequate hydration/nutrition with optimum safety and efficiency of swallowing function on PO intake without overt signs and symptoms of aspiration for regular diet level.    Established this date     2. Patient  will utilize compensatory strategies with optimum safety and efficiency of swallowing function on PO intake without overt signs and symptoms of aspiration for regular diet level.      Established this date         Plan     Recommended Treatment Plan:  defer until results of Modified Barium Swallow Study. Patient prefers to receive therapy at [facility] if needed since it is closer to his home.     Other Recommendations:   Modified Barium Swallow Study - note sent to referring provider        Therapist's Name:   RAÚL Bob  Clinician     I certify that I was present in the room directing the student in service delivery and guiding them using my skilled judgment. As the co-signing therapist I have reviewed the students documentation and am responsible for the treatment, assessment, and plan.     LELE Shaw., CCC-SLP, CBIS  Speech-Language Pathologist  Certified Brain Injury Specialist     11/21/2023

## 2023-11-27 ENCOUNTER — TELEPHONE (OUTPATIENT)
Dept: GASTROENTEROLOGY | Facility: CLINIC | Age: 83
End: 2023-11-27
Payer: MEDICARE

## 2023-11-27 ENCOUNTER — OFFICE VISIT (OUTPATIENT)
Dept: CARDIOLOGY | Facility: CLINIC | Age: 83
End: 2023-11-27
Payer: MEDICARE

## 2023-11-27 ENCOUNTER — ANTI-COAG VISIT (OUTPATIENT)
Dept: CARDIOLOGY | Facility: CLINIC | Age: 83
End: 2023-11-27
Payer: MEDICARE

## 2023-11-27 ENCOUNTER — LAB VISIT (OUTPATIENT)
Dept: LAB | Facility: HOSPITAL | Age: 83
End: 2023-11-27
Attending: INTERNAL MEDICINE
Payer: MEDICARE

## 2023-11-27 VITALS
DIASTOLIC BLOOD PRESSURE: 78 MMHG | OXYGEN SATURATION: 100 % | BODY MASS INDEX: 28.04 KG/M2 | WEIGHT: 173.75 LBS | HEART RATE: 54 BPM | SYSTOLIC BLOOD PRESSURE: 140 MMHG

## 2023-11-27 DIAGNOSIS — Z79.01 ANTICOAGULANT LONG-TERM USE: ICD-10-CM

## 2023-11-27 DIAGNOSIS — I50.42 CHRONIC COMBINED SYSTOLIC AND DIASTOLIC HEART FAILURE: Primary | ICD-10-CM

## 2023-11-27 DIAGNOSIS — E11.51 TYPE 2 DIABETES MELLITUS WITH DIABETIC PERIPHERAL ANGIOPATHY WITHOUT GANGRENE, WITHOUT LONG-TERM CURRENT USE OF INSULIN: ICD-10-CM

## 2023-11-27 DIAGNOSIS — R55 SYNCOPE, UNSPECIFIED SYNCOPE TYPE: ICD-10-CM

## 2023-11-27 DIAGNOSIS — I70.219 ATHEROSCLEROSIS OF NATIVE ARTERY OF LOWER EXTREMITY WITH INTERMITTENT CLAUDICATION, UNSPECIFIED LATERALITY: ICD-10-CM

## 2023-11-27 DIAGNOSIS — I15.0 RENOVASCULAR HYPERTENSION: ICD-10-CM

## 2023-11-27 DIAGNOSIS — G56.03 BILATERAL CARPAL TUNNEL SYNDROME: ICD-10-CM

## 2023-11-27 DIAGNOSIS — Z79.01 LONG TERM (CURRENT) USE OF ANTICOAGULANTS: ICD-10-CM

## 2023-11-27 DIAGNOSIS — I48.0 PAROXYSMAL ATRIAL FIBRILLATION: ICD-10-CM

## 2023-11-27 DIAGNOSIS — I15.2 HYPERTENSION ASSOCIATED WITH DIABETES: ICD-10-CM

## 2023-11-27 DIAGNOSIS — E11.69 HYPERLIPIDEMIA ASSOCIATED WITH TYPE 2 DIABETES MELLITUS: ICD-10-CM

## 2023-11-27 DIAGNOSIS — E11.59 HYPERTENSION ASSOCIATED WITH DIABETES: ICD-10-CM

## 2023-11-27 DIAGNOSIS — E78.5 HYPERLIPIDEMIA ASSOCIATED WITH TYPE 2 DIABETES MELLITUS: ICD-10-CM

## 2023-11-27 DIAGNOSIS — Z95.2 H/O MECHANICAL AORTIC VALVE REPLACEMENT: ICD-10-CM

## 2023-11-27 DIAGNOSIS — I25.10 CORONARY ARTERY DISEASE INVOLVING NATIVE CORONARY ARTERY OF NATIVE HEART WITHOUT ANGINA PECTORIS: ICD-10-CM

## 2023-11-27 DIAGNOSIS — I34.0 NONRHEUMATIC MITRAL VALVE REGURGITATION: ICD-10-CM

## 2023-11-27 DIAGNOSIS — N18.4 CHRONIC KIDNEY DISEASE (CKD), STAGE IV (SEVERE): ICD-10-CM

## 2023-11-27 DIAGNOSIS — I65.23 BILATERAL CAROTID ARTERY STENOSIS: ICD-10-CM

## 2023-11-27 LAB
INR PPP: 1.5 (ref 0.8–1.2)
PROTHROMBIN TIME: 16.1 SEC (ref 9–12.5)

## 2023-11-27 PROCEDURE — 1101F PR PT FALLS ASSESS DOC 0-1 FALLS W/OUT INJ PAST YR: ICD-10-PCS | Mod: HCNC,CPTII,S$GLB, | Performed by: INTERNAL MEDICINE

## 2023-11-27 PROCEDURE — 3078F PR MOST RECENT DIASTOLIC BLOOD PRESSURE < 80 MM HG: ICD-10-PCS | Mod: HCNC,CPTII,S$GLB, | Performed by: INTERNAL MEDICINE

## 2023-11-27 PROCEDURE — 1101F PT FALLS ASSESS-DOCD LE1/YR: CPT | Mod: HCNC,CPTII,S$GLB, | Performed by: INTERNAL MEDICINE

## 2023-11-27 PROCEDURE — 3072F LOW RISK FOR RETINOPATHY: CPT | Mod: HCNC,CPTII,S$GLB, | Performed by: INTERNAL MEDICINE

## 2023-11-27 PROCEDURE — 99214 PR OFFICE/OUTPT VISIT, EST, LEVL IV, 30-39 MIN: ICD-10-PCS | Mod: HCNC,S$GLB,, | Performed by: INTERNAL MEDICINE

## 2023-11-27 PROCEDURE — 1126F PR PAIN SEVERITY QUANTIFIED, NO PAIN PRESENT: ICD-10-PCS | Mod: HCNC,CPTII,S$GLB, | Performed by: INTERNAL MEDICINE

## 2023-11-27 PROCEDURE — 36415 COLL VENOUS BLD VENIPUNCTURE: CPT | Mod: HCNC | Performed by: INTERNAL MEDICINE

## 2023-11-27 PROCEDURE — 85610 PROTHROMBIN TIME: CPT | Mod: HCNC | Performed by: INTERNAL MEDICINE

## 2023-11-27 PROCEDURE — 3077F SYST BP >= 140 MM HG: CPT | Mod: HCNC,CPTII,S$GLB, | Performed by: INTERNAL MEDICINE

## 2023-11-27 PROCEDURE — 1159F PR MEDICATION LIST DOCUMENTED IN MEDICAL RECORD: ICD-10-PCS | Mod: HCNC,CPTII,S$GLB, | Performed by: INTERNAL MEDICINE

## 2023-11-27 PROCEDURE — 3288F PR FALLS RISK ASSESSMENT DOCUMENTED: ICD-10-PCS | Mod: HCNC,CPTII,S$GLB, | Performed by: INTERNAL MEDICINE

## 2023-11-27 PROCEDURE — 1159F MED LIST DOCD IN RCRD: CPT | Mod: HCNC,CPTII,S$GLB, | Performed by: INTERNAL MEDICINE

## 2023-11-27 PROCEDURE — 99214 OFFICE O/P EST MOD 30 MIN: CPT | Mod: HCNC,S$GLB,, | Performed by: INTERNAL MEDICINE

## 2023-11-27 PROCEDURE — 1126F AMNT PAIN NOTED NONE PRSNT: CPT | Mod: HCNC,CPTII,S$GLB, | Performed by: INTERNAL MEDICINE

## 2023-11-27 PROCEDURE — 3077F PR MOST RECENT SYSTOLIC BLOOD PRESSURE >= 140 MM HG: ICD-10-PCS | Mod: HCNC,CPTII,S$GLB, | Performed by: INTERNAL MEDICINE

## 2023-11-27 PROCEDURE — 3078F DIAST BP <80 MM HG: CPT | Mod: HCNC,CPTII,S$GLB, | Performed by: INTERNAL MEDICINE

## 2023-11-27 PROCEDURE — 99999 PR PBB SHADOW E&M-EST. PATIENT-LVL III: CPT | Mod: PBBFAC,HCNC,, | Performed by: INTERNAL MEDICINE

## 2023-11-27 PROCEDURE — 3072F PR LOW RISK FOR RETINOPATHY: ICD-10-PCS | Mod: HCNC,CPTII,S$GLB, | Performed by: INTERNAL MEDICINE

## 2023-11-27 PROCEDURE — 3288F FALL RISK ASSESSMENT DOCD: CPT | Mod: HCNC,CPTII,S$GLB, | Performed by: INTERNAL MEDICINE

## 2023-11-27 PROCEDURE — 99999 PR PBB SHADOW E&M-EST. PATIENT-LVL III: ICD-10-PCS | Mod: PBBFAC,HCNC,, | Performed by: INTERNAL MEDICINE

## 2023-11-27 NOTE — PROGRESS NOTES
Subjective:    Patient ID:  Dariel Urbina is a 83 y.o. male who presents for follow-up of No chief complaint on file.      HPI  The patient  is an 83 year old male last seen 9/11;/23 with CAD s/p CABG (1990s), mechanical AVR (on warfarin), moderate-severe MR, CKD stage 4 who presented in Intervention clinic 4/25/23 for evaluation for CORNELL Mitral valve. He underwent coronary angiogram procedure due to abnormal stress test and also as part of pre mitral clip evaluation but no intervention was done. A KIRSTEN 4/14/23 revealed only mild mitral regurgitation and Griselda Clip was deferred... He continued with LÓPEZ at 100 feet and ever working with his hands. EKG was in AF with frequent PVCs. EF 45% unchanged from 2022 . He continues to report LÓPEZ and claudication walking  feet but does not walk for managing his LÓPEZ and claudication.He did not desaturate on 6 minute walk  Lab Results   Component Value Date     11/07/2023    K 4.8 11/07/2023     11/07/2023    CO2 21 (L) 11/07/2023    BUN 47 (H) 11/07/2023    CREATININE 2.3 (H) 11/07/2023    GLU 98 11/07/2023    HGBA1C 6.1 (H) 11/07/2023    MG 1.7 04/15/2023    AST 36 11/07/2023    ALT 25 11/07/2023    ALBUMIN 3.0 (L) 11/07/2023    PROT 6.8 11/07/2023    BILITOT 0.3 11/07/2023    WBC 5.17 11/03/2023    HGB 8.5 (L) 11/07/2023    HCT 23.2 (L) 11/03/2023     (H) 11/03/2023     11/03/2023    INR 2.6 (H) 11/07/2023    INR 3.7 01/31/2022    INR 2.0 (H) 03/15/2010    PSA 0.35 07/27/2012    TSH 3.705 09/29/2022         Lab Results   Component Value Date    CHOL 115 (L) 02/28/2023    HDL 37 (L) 02/28/2023    TRIG 155 (H) 02/28/2023       Lab Results   Component Value Date    LDLCALC 47.0 (L) 02/28/2023       Past Medical History:   Diagnosis Date    Anemia of chronic renal failure, stage 3 (moderate) 05/27/2015    Anticoagulant long-term use     Atherosclerosis of coronary artery bypass graft of native heart without angina pectoris 09/11/2012     3-27-18 [facility] Two vessel coronary artery disease.   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    Bilateral carotid artery disease 02/09/2017    Bleeding from the nose     Bleeding nose 03/21/2018    Cancer     Cataract     CHF (congestive heart failure)     CKD (chronic kidney disease) stage 3, GFR 30-59 ml/min 05/27/2015    Claudication of left lower extremity 09/17/2014    Colon polyp     Encounter for blood transfusion     Gastroesophageal reflux disease without esophagitis 03/19/2018    Gastrointestinal hemorrhage associated with intestinal diverticulosis 04/01/2018    Glaucoma     H/O mechanical aortic valve replacement 09/17/2014    History of gout 09/26/2012    Hyperparathyroidism due to renal insufficiency 07/27/2015    Internal hemorrhoid 04/03/2018    Long term current use of anticoagulant therapy 09/26/2012    Mechanical heart valve present     Metabolic acidosis with normal anion gap and bicarbonate losses 03/20/2018    Mixed hyperlipidemia 09/26/2012    NSTEMI (non-ST elevated myocardial infarction) 03/21/2018    Obesity, diabetes, and hypertension syndrome 02/23/2016    Paroxysmal atrial fibrillation 02/06/2023    PVD (peripheral vascular disease) 09/11/2012    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    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 02/01/2023    vertex scalp    Syncope 09/29/2022    Type 2 diabetes mellitus with diabetic peripheral angiopathy without gangrene 05/27/2015    Type 2 diabetes mellitus with stage 3 chronic kidney disease, without long-term current use of insulin 10/02/2013       Current Outpatient Medications:     acetaminophen (TYLENOL) 500 MG tablet, Take 500 mg by mouth daily as needed for Pain., Disp: , Rfl:     allopurinoL (ZYLOPRIM) 100 MG tablet, Take 1 tablet (100 mg total) by mouth once daily., Disp: 90 tablet, Rfl: 3    amoxicillin-clavulanate 875-125mg (AUGMENTIN) 875-125 mg per tablet, Take 1 tablet by mouth every 12  (twelve) hours., Disp: 5 tablet, Rfl: 0    bumetanide (BUMEX) 1 MG tablet, Take 1 tablet (1 mg total) by mouth every other day., Disp: 45 tablet, Rfl: 3    diphenhydrAMINE (BENADRYL) 25 mg capsule, Take 50 mg by mouth nightly as needed for Insomnia., Disp: , Rfl:     ferrous gluconate (FERGON) 324 MG tablet, Take 1 tablet (324 mg total) by mouth daily with breakfast., Disp: 90 tablet, Rfl: 1    isosorbide mononitrate (IMDUR) 60 MG 24 hr tablet, Take 1 tablet (60 mg total) by mouth every evening., Disp: 90 tablet, Rfl: 3    loperamide (IMODIUM) 2 mg capsule, Take 2 mg by mouth 4 (four) times daily as needed for Diarrhea., Disp: , Rfl:     metoprolol succinate (TOPROL-XL) 25 MG 24 hr tablet, Take 1 tablet (25 mg total) by mouth once daily., Disp: 90 tablet, Rfl: 3    omega-3 fatty acids/fish oil (FISH OIL-OMEGA-3 FATTY ACIDS) 300-1,000 mg capsule, Take 1 capsule by mouth once daily., Disp: , Rfl:     oxymetazoline (AFRIN) 0.05 % nasal spray, Use 2 sprays by Nasal route daily as needed (nose bleed). (Patient taking differently: 2 sprays by Nasal route every 2 (two) hours as needed (nose bleed).), Disp: 30 mL, Rfl: 0    rosuvastatin (CRESTOR) 40 MG Tab, TAKE 1 TABLET EVERY EVENING., Disp: 90 tablet, Rfl: 3    warfarin (COUMADIN) 5 MG tablet, Take warfarin 7.5mg (1.5 tablets) PO Tuesday & Saturday, then take 5mg PO all other days or as instructed by Coumadin Clinic, Disp: 45 tablet, Rfl: 3  No current facility-administered medications for this visit.    Facility-Administered Medications Ordered in Other Visits:     ceFAZolin 2 g in dextrose 5 % in water (D5W) 50 mL IVPB (MB+), 2 g, Intravenous, On Call Procedure, Yenifer Sandoval MD    haloperidol lactate injection 0.5 mg, 0.5 mg, Intravenous, Q10 Min PRN, Saeed Farrell MD    HYDROmorphone injection 0.2 mg, 0.2 mg, Intravenous, Q5 Min PRN, Saeed Farrell MD    sodium chloride 0.9% flush 10 mL, 10 mL, Intravenous, PRN, Saeed Farrell MD          ROS      Objective:There were no vitals taken for this visit.            Physical Exam  Cardiovascular:      Pulses:           Carotid pulses are 2+ on the right side and 2+ on the left side.       Dorsalis pedis pulses are 0 on the right side and 0 on the left side.        Posterior tibial pulses are 0 on the right side and 0 on the left side.      Comments: No edema  JVP normal      Assessment:       1. Bilateral carpal tunnel syndrome    2. Hyperlipidemia associated with type 2 diabetes mellitus    3. Renovascular hypertension    4. Atherosclerosis of native artery of lower extremity with intermittent claudication, unspecified laterality    5. Bilateral carotid artery stenosis    6. Hypertension associated with diabetes    7. Coronary artery disease involving native coronary artery of native heart without angina pectoris    8. Chronic combined systolic and diastolic heart failure    9. Paroxysmal atrial fibrillation    10. Nonrheumatic mitral valve regurgitation    11. Chronic kidney disease (CKD), stage IV (severe)    12. Type 2 diabetes mellitus with diabetic peripheral angiopathy without gangrene, without long-term current use of insulin    13. Syncope, unspecified syncope type         Plan:       Diagnoses and all orders for this visit:    Bilateral carpal tunnel syndrome    Hyperlipidemia associated with type 2 diabetes mellitus    Renovascular hypertension    Atherosclerosis of native artery of lower extremity with intermittent claudication, unspecified laterality    Bilateral carotid artery stenosis    Hypertension associated with diabetes    Coronary artery disease involving native coronary artery of native heart without angina pectoris    Chronic combined systolic and diastolic heart failure    Paroxysmal atrial fibrillation    Nonrheumatic mitral valve regurgitation    Chronic kidney disease (CKD), stage IV (severe)    Type 2 diabetes mellitus with diabetic peripheral angiopathy without gangrene, without  long-term current use of insulin    Syncope, unspecified syncope type

## 2023-11-27 NOTE — PROGRESS NOTES
INR not at goal. Medications, chart, and patient findings reviewed. See calendar for adjustments to dose and follow up plan.  Contact MD regarding continued bleeding s/p procedure.

## 2023-11-28 NOTE — PROGRESS NOTES
11/28/23 Wife called and reported she did not get message with dose change for 11/27 and had Patient take 5 mg 11/27, needs call back at # [phone]     How Severe Is It?: mild Is This A New Presentation, Or A Follow-Up?: Rash Additional History: Pt used Vaseline and did not help.

## 2023-11-28 NOTE — TELEPHONE ENCOUNTER
----- Message from Sergei Ayon MD sent at 11/26/2023  9:46 AM CST -----  Regarding: RE: MBSS order  Is there any way you could place orders for that as I do not know how to place orders for that I will be happy to sign it  ----- Message -----  From: Usha Hardy MCD, CCC-SLP  Sent: 11/21/2023  11:00 AM CST  To: Sergei Ayon MD; Sydney Frye MA  Subject: MBSS order                                       Dr. Ayon,    I evaluated your patient, Dariel Urbina , for dysphagia today. he  presents with s/s of dysphagia across al consistencies. I believe he  would benefit from a Modified Barium Swallow Study to objectively assess his  swallow, rule out silent aspiration, and determine the safest possible diet. Please place the order for AMB Modified Barium Swallow Study, order number [id number].     Thank You,  LELE Shaw., CCC-SLP, CBIS  Speech-Language Pathologist  Certified Brain Injury Specialist

## 2023-12-05 ENCOUNTER — OFFICE VISIT (OUTPATIENT)
Dept: INTERNAL MEDICINE | Facility: CLINIC | Age: 83
End: 2023-12-05
Payer: MEDICARE

## 2023-12-05 ENCOUNTER — ANTI-COAG VISIT (OUTPATIENT)
Dept: CARDIOLOGY | Facility: CLINIC | Age: 83
End: 2023-12-05
Payer: MEDICARE

## 2023-12-05 ENCOUNTER — LAB VISIT (OUTPATIENT)
Dept: LAB | Facility: HOSPITAL | Age: 83
End: 2023-12-05
Attending: INTERNAL MEDICINE
Payer: MEDICARE

## 2023-12-05 VITALS
OXYGEN SATURATION: 99 % | DIASTOLIC BLOOD PRESSURE: 68 MMHG | HEART RATE: 60 BPM | HEIGHT: 65 IN | BODY MASS INDEX: 28.76 KG/M2 | SYSTOLIC BLOOD PRESSURE: 122 MMHG | WEIGHT: 172.63 LBS

## 2023-12-05 DIAGNOSIS — E11.69 HYPERLIPIDEMIA ASSOCIATED WITH TYPE 2 DIABETES MELLITUS: ICD-10-CM

## 2023-12-05 DIAGNOSIS — Z87.39 HISTORY OF GOUT: ICD-10-CM

## 2023-12-05 DIAGNOSIS — E78.5 HYPERLIPIDEMIA ASSOCIATED WITH TYPE 2 DIABETES MELLITUS: ICD-10-CM

## 2023-12-05 DIAGNOSIS — N18.4 CHRONIC KIDNEY DISEASE (CKD), STAGE IV (SEVERE): ICD-10-CM

## 2023-12-05 DIAGNOSIS — Z79.01 ANTICOAGULANT LONG-TERM USE: ICD-10-CM

## 2023-12-05 DIAGNOSIS — E11.51 TYPE 2 DIABETES MELLITUS WITH DIABETIC PERIPHERAL ANGIOPATHY WITHOUT GANGRENE, WITHOUT LONG-TERM CURRENT USE OF INSULIN: ICD-10-CM

## 2023-12-05 DIAGNOSIS — I15.2 HYPERTENSION ASSOCIATED WITH DIABETES: ICD-10-CM

## 2023-12-05 DIAGNOSIS — Z86.010 HX OF COLONIC POLYP: ICD-10-CM

## 2023-12-05 DIAGNOSIS — Z95.2 H/O MECHANICAL AORTIC VALVE REPLACEMENT: ICD-10-CM

## 2023-12-05 DIAGNOSIS — E11.59 HYPERTENSION ASSOCIATED WITH DIABETES: ICD-10-CM

## 2023-12-05 DIAGNOSIS — D64.9 ANEMIA, UNSPECIFIED TYPE: ICD-10-CM

## 2023-12-05 DIAGNOSIS — R04.0 RECURRENT EPISTAXIS: ICD-10-CM

## 2023-12-05 DIAGNOSIS — I70.0 AORTIC CALCIFICATION: Primary | ICD-10-CM

## 2023-12-05 DIAGNOSIS — I48.0 PAROXYSMAL ATRIAL FIBRILLATION: ICD-10-CM

## 2023-12-05 LAB
ALBUMIN/CREAT UR: 1386.9 UG/MG (ref 0–30)
CREAT UR-MCNC: 99 MG/DL (ref 23–375)
INR PPP: 2.3 (ref 0.8–1.2)
MICROALBUMIN UR DL<=1MG/L-MCNC: 1373 UG/ML
PROTHROMBIN TIME: 23.5 SEC (ref 9–12.5)

## 2023-12-05 PROCEDURE — 85610 PROTHROMBIN TIME: CPT | Mod: HCNC | Performed by: INTERNAL MEDICINE

## 2023-12-05 PROCEDURE — 82043 UR ALBUMIN QUANTITATIVE: CPT | Mod: HCNC | Performed by: INTERNAL MEDICINE

## 2023-12-05 PROCEDURE — 1101F PR PT FALLS ASSESS DOC 0-1 FALLS W/OUT INJ PAST YR: ICD-10-PCS | Mod: HCNC,CPTII,S$GLB, | Performed by: INTERNAL MEDICINE

## 2023-12-05 PROCEDURE — 99214 PR OFFICE/OUTPT VISIT, EST, LEVL IV, 30-39 MIN: ICD-10-PCS | Mod: HCNC,S$GLB,, | Performed by: INTERNAL MEDICINE

## 2023-12-05 PROCEDURE — 3288F FALL RISK ASSESSMENT DOCD: CPT | Mod: HCNC,CPTII,S$GLB, | Performed by: INTERNAL MEDICINE

## 2023-12-05 PROCEDURE — 99999 PR PBB SHADOW E&M-EST. PATIENT-LVL III: CPT | Mod: PBBFAC,HCNC,, | Performed by: INTERNAL MEDICINE

## 2023-12-05 PROCEDURE — 99214 OFFICE O/P EST MOD 30 MIN: CPT | Mod: HCNC,S$GLB,, | Performed by: INTERNAL MEDICINE

## 2023-12-05 PROCEDURE — 1126F AMNT PAIN NOTED NONE PRSNT: CPT | Mod: HCNC,CPTII,S$GLB, | Performed by: INTERNAL MEDICINE

## 2023-12-05 PROCEDURE — 3078F DIAST BP <80 MM HG: CPT | Mod: HCNC,CPTII,S$GLB, | Performed by: INTERNAL MEDICINE

## 2023-12-05 PROCEDURE — 1159F PR MEDICATION LIST DOCUMENTED IN MEDICAL RECORD: ICD-10-PCS | Mod: HCNC,CPTII,S$GLB, | Performed by: INTERNAL MEDICINE

## 2023-12-05 PROCEDURE — 3078F PR MOST RECENT DIASTOLIC BLOOD PRESSURE < 80 MM HG: ICD-10-PCS | Mod: HCNC,CPTII,S$GLB, | Performed by: INTERNAL MEDICINE

## 2023-12-05 PROCEDURE — 3074F PR MOST RECENT SYSTOLIC BLOOD PRESSURE < 130 MM HG: ICD-10-PCS | Mod: HCNC,CPTII,S$GLB, | Performed by: INTERNAL MEDICINE

## 2023-12-05 PROCEDURE — 3288F PR FALLS RISK ASSESSMENT DOCUMENTED: ICD-10-PCS | Mod: HCNC,CPTII,S$GLB, | Performed by: INTERNAL MEDICINE

## 2023-12-05 PROCEDURE — 93793 ANTICOAG MGMT PT WARFARIN: CPT | Mod: S$GLB,,,

## 2023-12-05 PROCEDURE — 3074F SYST BP LT 130 MM HG: CPT | Mod: HCNC,CPTII,S$GLB, | Performed by: INTERNAL MEDICINE

## 2023-12-05 PROCEDURE — 1126F PR PAIN SEVERITY QUANTIFIED, NO PAIN PRESENT: ICD-10-PCS | Mod: HCNC,CPTII,S$GLB, | Performed by: INTERNAL MEDICINE

## 2023-12-05 PROCEDURE — 3072F LOW RISK FOR RETINOPATHY: CPT | Mod: HCNC,CPTII,S$GLB, | Performed by: INTERNAL MEDICINE

## 2023-12-05 PROCEDURE — 93793 PR ANTICOAGULANT MGMT FOR PT TAKING WARFARIN: ICD-10-PCS | Mod: S$GLB,,,

## 2023-12-05 PROCEDURE — 36415 COLL VENOUS BLD VENIPUNCTURE: CPT | Mod: HCNC | Performed by: INTERNAL MEDICINE

## 2023-12-05 PROCEDURE — 1101F PT FALLS ASSESS-DOCD LE1/YR: CPT | Mod: HCNC,CPTII,S$GLB, | Performed by: INTERNAL MEDICINE

## 2023-12-05 PROCEDURE — 99999 PR PBB SHADOW E&M-EST. PATIENT-LVL III: ICD-10-PCS | Mod: PBBFAC,HCNC,, | Performed by: INTERNAL MEDICINE

## 2023-12-05 PROCEDURE — 3072F PR LOW RISK FOR RETINOPATHY: ICD-10-PCS | Mod: HCNC,CPTII,S$GLB, | Performed by: INTERNAL MEDICINE

## 2023-12-05 PROCEDURE — 1159F MED LIST DOCD IN RCRD: CPT | Mod: HCNC,CPTII,S$GLB, | Performed by: INTERNAL MEDICINE

## 2023-12-05 RX ORDER — TRAZODONE HYDROCHLORIDE 50 MG/1
50 TABLET ORAL NIGHTLY PRN
Qty: 30 TABLET | Refills: 11 | Status: SHIPPED | OUTPATIENT
Start: 2023-12-05 | End: 2023-12-05 | Stop reason: SDUPTHER

## 2023-12-05 NOTE — TELEPHONE ENCOUNTER
----- Message from Desi Gray sent at 12/5/2023  2:13 PM CST -----  Contact: PT Spouse Monse@ [phone]  Requesting an RX refill or new RX.    Is this a refill or new RX: --New RX--    RX name and strength (copy/paste from chart):    1.traZODone (DESYREL) 50 MG tablet    Is this a 30 day or 90 day RX: --30 days with refills--    Pharmacy name and phone # (copy/paste from chart):    [id number] [place] - [address]  [address]  [place] [address]  Phone: [phone] Fax: [phone]      Comment: Pt spouse states that the medication was sent to the wrong pharmacy. Please resend to the pharmacy listed above.

## 2023-12-05 NOTE — PROGRESS NOTES
82 yo M here for follow up his medical problems.   I saw him last on May 16th,  2023.              He has diabetes-- We stopped his glimepiride due to low glucoses.  Recent hgb A1c was 6.1.       He is on amaryl 1mg last visit.   Weight today is 176 # lbs-   . . Patient denies polyuria, polydipsia, visual disturbances.             He has htn.  He is back on  coreg 25 , bumex 1 mg q d, imdur 60 mg a day  and low dose lisinopril 2.5 mg a day    CAD s/p CABG (1990s), mechanical AVR (on warfarin), moderate-severe MR--MitraClip.  KIRSTEN performed 4/14 and showed moderate MR.  Hs last cardiovascular stent was in November of 2021.  He has had severe calcific atherosclerosis seen on multiple ct scans over time.             He continues to have epistaxis - He did have 2 days with out any incidnet but had a nose bleed Monday.  He is seeing ENt regularly and has underwent endoscopy nasal cautery on 11/3/23 .  He also received 1 unit PRBC after this.   He previously underwent sphenopalatine artery ligation on 06/14/2023  HGB was 8.5 on 11/7/2023.    .   He is on both Coumadin and Plavix any has history of anemia.                       Hyperlipidemia. On Crestor 40  mg , --Recent lipid panel was reviewed.  . He says he is taking all his cholesterol meds. Chol 1115, hdl 37, Ldl 47   2/28/23-              CKD stage IV  Pt saw Nephrology in May 2023 -He has a floow up with them later this month.  -   He has had some acute on chronic kidney failure.  Most recent creatinine is 2.1.    I year ago his creatinine was 2.3.   His potasium is  4.8.  He has been instructed on a a low potasium diet by nephrology, but is not following one.           H/o partial colon resection due to diverticulosis. See above.  NO GI  bleeding since last visit.  NO recent diarrhea.                AAA- last us Showed 3.32 cm AAA- (9/29/22 )largest supra renal area. since last visit -            H/o gout. On allopurinol. Last flare was  24 mo ago in left foot.  "He remains on Allopurinol. He is staying way form crabs.                Review of Systems    Constitutional: Negative for fever, chills and unexpected weight change.  ( up 3 # since last visit)   HENT: Negative for congestion, rhinorrhea and sinus pressure.  Nose blleds as above.    Eyes: Negative for visual disturbance.    Respiratory: He Has some SOB,but better since his mitral valve clip/         Cardiovascular:as above.  .    Gastrointestinal: Negative for nausea, vomiting, abdominal pain, and constipation.  He does have some diarrhea-  - as above.    Genitourinary: Negative for dysuria, urgency and difficulty urinating.    Musculoskeletal: He reports his back- that was bothering him last vsiit- is not bothering him recently.    Skin: Negative.    Neurological: Negative for dizziness, syncope and headaches.    Hematological: Negative.    Psychiatric/Behavioral: Negative.    All other systems reviewed and are negative.    Objective:               /68 (BP Location: Right arm, Patient Position: Sitting, BP Method: Medium (Manual))   Pulse 60   Ht 5' 5" (1.651 m)   Wt 78.3 kg (172 lb 9.9 oz)   SpO2 99%   BMI 28.73 kg/m²        Constitutional: He is oriented to person, place, and time. He appears well-developed and well-nourished. No distress.    HENT:    Head: Normocephalic and atraumatic. He has a benign lesion on his left check.    Mouth/Throat: Oropharynx is clear and moist. No oropharyngeal exudate.    Eyes: Conjunctivae and EOM are normal.    Neck: Normal range of motion. Neck supple. No JVD present. No tracheal deviation present.    Cardiovascular: Regular rhythm and normal heart sounds. Exam reveals no gallop and no friction rub.  metalic click present .   Systolic murmur heard.     Pulmonary/Chest: Effort normal and breath sounds normal. No respiratory distress. He has no wheezes. He has no rales.    Abdominal: Soft. Bowel sounds are normal. He exhibits no distension. There is no tenderness. There " is no rebound.   Musculoskeletal: Normal range of motion. He exhibits no edema and no tenderness.   Neurological: He is alert and oriented to person, place, and time.    Skin: Skin is warm and dry. No rash noted. He is not diaphoretic. No erythema.   Psychiatric: He has a normal mood and affect. His behavior is normal.       He hs a hyperkeratotic spot on the top of his forehead.                   Assessment:      1.   DM (diabetes mellitus)     2.   Hyperlipidemia     3.   Chronic kidney disease, stage 4   4.   History of colon resection     5.   Hypertension     6.   CAD (coronary atherosclerotic disease)     7.    8.  9.  10.   11. History of aortic valve replacement and MV repair.   AAA  AVR-mech- on coumadin   Colon polyps--    Anemia-- will recheck in    Plan:            Follow up with nephrology and ENT>    Following with coumadin clinc-- I will check cbc in 2 months-  will follow up in 4 months.

## 2023-12-05 NOTE — TELEPHONE ENCOUNTER
Care Due:                  Date            Visit Type   Department     Provider  --------------------------------------------------------------------------------                                EP -                              PRIMARY      [facility] INTERNAL  Last Visit: 12-      CARE ([facility])   JOSHUA Langston                              [facility]                              PRIMARY      [facility] INTERNAL  Next Visit: 04-      CARE ([facility])   [facility]       Devon Langston                                                            Last  Test          Frequency    Reason                     Performed    Due Date  --------------------------------------------------------------------------------    Lipid Panel.  12 months..  rosuvastatin.............  02- 02-    Uric Acid...  12 months..  allopurinoL..............  Not Found    Overdue    [employer] Embedded Care Due Messages. Reference number: [id number].   12/05/2023 2:24:25 PM CST

## 2023-12-06 ENCOUNTER — TELEPHONE (OUTPATIENT)
Dept: PHARMACY | Facility: CLINIC | Age: 83
End: 2023-12-06
Payer: MEDICARE

## 2023-12-06 ENCOUNTER — PATIENT MESSAGE (OUTPATIENT)
Dept: INTERNAL MEDICINE | Facility: CLINIC | Age: 83
End: 2023-12-06
Payer: MEDICARE

## 2023-12-06 RX ORDER — TRAZODONE HYDROCHLORIDE 50 MG/1
50 TABLET ORAL NIGHTLY PRN
Qty: 30 TABLET | Refills: 11 | Status: ON HOLD | OUTPATIENT
Start: 2023-12-06 | End: 2024-02-28 | Stop reason: HOSPADM

## 2023-12-06 NOTE — TELEPHONE ENCOUNTER
Assessed patient medication adherence for population health /[facility] medication adherence project   Noted

## 2023-12-11 ENCOUNTER — TELEPHONE (OUTPATIENT)
Dept: OPTOMETRY | Facility: CLINIC | Age: 83
End: 2023-12-11
Payer: MEDICARE

## 2023-12-15 ENCOUNTER — HOSPITAL ENCOUNTER (INPATIENT)
Facility: HOSPITAL | Age: 83
LOS: 3 days | Discharge: HOME | DRG: 151 | End: 2023-12-19
Attending: EMERGENCY MEDICINE | Admitting: STUDENT IN AN ORGANIZED HEALTH CARE EDUCATION/TRAINING PROGRAM
Payer: MEDICARE

## 2023-12-15 ENCOUNTER — OFFICE VISIT (OUTPATIENT)
Dept: OTOLARYNGOLOGY | Facility: CLINIC | Age: 83
End: 2023-12-15
Payer: MEDICARE

## 2023-12-15 ENCOUNTER — ANESTHESIA EVENT (OUTPATIENT)
Dept: SURGERY | Facility: HOSPITAL | Age: 83
DRG: 151 | End: 2023-12-15
Payer: MEDICARE

## 2023-12-15 VITALS — DIASTOLIC BLOOD PRESSURE: 55 MMHG | SYSTOLIC BLOOD PRESSURE: 146 MMHG | HEART RATE: 60 BPM

## 2023-12-15 DIAGNOSIS — R07.9 CHEST PAIN: ICD-10-CM

## 2023-12-15 DIAGNOSIS — R04.0 RECURRENT EPISTAXIS: Primary | ICD-10-CM

## 2023-12-15 DIAGNOSIS — Z79.01 ANTICOAGULANT LONG-TERM USE: ICD-10-CM

## 2023-12-15 DIAGNOSIS — D64.9 ANEMIA, UNSPECIFIED TYPE: ICD-10-CM

## 2023-12-15 DIAGNOSIS — N18.32 STAGE 3B CHRONIC KIDNEY DISEASE: ICD-10-CM

## 2023-12-15 DIAGNOSIS — R53.83 FATIGUE: ICD-10-CM

## 2023-12-15 LAB
ABO + RH BLD: NORMAL
ALBUMIN SERPL BCP-MCNC: 3.2 G/DL (ref 3.5–5.2)
ALP SERPL-CCNC: 60 U/L (ref 55–135)
ALT SERPL W/O P-5'-P-CCNC: 14 U/L (ref 10–44)
ANION GAP SERPL CALC-SCNC: 8 MMOL/L (ref 8–16)
APTT PPP: 39.3 SEC (ref 21–32)
AST SERPL-CCNC: 19 U/L (ref 10–40)
BASOPHILS # BLD AUTO: 0.02 K/UL (ref 0–0.2)
BASOPHILS NFR BLD: 0.4 % (ref 0–1.9)
BILIRUB SERPL-MCNC: 0.3 MG/DL (ref 0.1–1)
BLD GP AB SCN CELLS X3 SERPL QL: NORMAL
BLD PROD TYP BPU: NORMAL
BLOOD UNIT EXPIRATION DATE: NORMAL
BLOOD UNIT TYPE CODE: 9500
BLOOD UNIT TYPE: NORMAL
BNP SERPL-MCNC: 793 PG/ML (ref 0–99)
BUN SERPL-MCNC: 50 MG/DL (ref 8–23)
CALCIUM SERPL-MCNC: 9.7 MG/DL (ref 8.7–10.5)
CHLORIDE SERPL-SCNC: 111 MMOL/L (ref 95–110)
CO2 SERPL-SCNC: 20 MMOL/L (ref 23–29)
CODING SYSTEM: NORMAL
CREAT SERPL-MCNC: 2.8 MG/DL (ref 0.5–1.4)
CROSSMATCH INTERPRETATION: NORMAL
DIFFERENTIAL METHOD: ABNORMAL
DISPENSE STATUS: NORMAL
EOSINOPHIL # BLD AUTO: 0.1 K/UL (ref 0–0.5)
EOSINOPHIL NFR BLD: 2.6 % (ref 0–8)
ERYTHROCYTE [DISTWIDTH] IN BLOOD BY AUTOMATED COUNT: 15.1 % (ref 11.5–14.5)
EST. GFR  (NO RACE VARIABLE): 21.8 ML/MIN/1.73 M^2
GLUCOSE SERPL-MCNC: 118 MG/DL (ref 70–110)
HCT VFR BLD AUTO: 22 % (ref 40–54)
HGB BLD-MCNC: 6.9 G/DL (ref 14–18)
IMM GRANULOCYTES # BLD AUTO: 0.01 K/UL (ref 0–0.04)
IMM GRANULOCYTES NFR BLD AUTO: 0.2 % (ref 0–0.5)
INR PPP: 5.2 (ref 0.8–1.2)
LYMPHOCYTES # BLD AUTO: 0.9 K/UL (ref 1–4.8)
LYMPHOCYTES NFR BLD: 15.7 % (ref 18–48)
MCH RBC QN AUTO: 31.2 PG (ref 27–31)
MCHC RBC AUTO-ENTMCNC: 31.4 G/DL (ref 32–36)
MCV RBC AUTO: 100 FL (ref 82–98)
MONOCYTES # BLD AUTO: 0.4 K/UL (ref 0.3–1)
MONOCYTES NFR BLD: 7.3 % (ref 4–15)
NEUTROPHILS # BLD AUTO: 4 K/UL (ref 1.8–7.7)
NEUTROPHILS NFR BLD: 73.8 % (ref 38–73)
NRBC BLD-RTO: 0 /100 WBC
PLATELET # BLD AUTO: 144 K/UL (ref 150–450)
PMV BLD AUTO: 10.7 FL (ref 9.2–12.9)
POTASSIUM SERPL-SCNC: 4.6 MMOL/L (ref 3.5–5.1)
PROT SERPL-MCNC: 6.7 G/DL (ref 6–8.4)
PROTHROMBIN TIME: 50.1 SEC (ref 9–12.5)
RBC # BLD AUTO: 2.21 M/UL (ref 4.6–6.2)
SODIUM SERPL-SCNC: 139 MMOL/L (ref 136–145)
SPECIMEN OUTDATE: NORMAL
TRANS ERYTHROCYTES VOL PATIENT: NORMAL ML
TROPONIN I SERPL DL<=0.01 NG/ML-MCNC: 0.05 NG/ML (ref 0–0.03)
TROPONIN I SERPL DL<=0.01 NG/ML-MCNC: 0.06 NG/ML (ref 0–0.03)
WBC # BLD AUTO: 5.47 K/UL (ref 3.9–12.7)

## 2023-12-15 PROCEDURE — 96374 THER/PROPH/DIAG INJ IV PUSH: CPT

## 2023-12-15 PROCEDURE — 25000003 PHARM REV CODE 250: Mod: HCNC | Performed by: EMERGENCY MEDICINE

## 2023-12-15 PROCEDURE — 84484 ASSAY OF TROPONIN QUANT: CPT | Mod: HCNC | Performed by: EMERGENCY MEDICINE

## 2023-12-15 PROCEDURE — 86920 COMPATIBILITY TEST SPIN: CPT | Mod: HCNC | Performed by: EMERGENCY MEDICINE

## 2023-12-15 PROCEDURE — 99999 PR PBB SHADOW E&M-EST. PATIENT-LVL III: ICD-10-PCS | Mod: PBBFAC,HCNC,, | Performed by: STUDENT IN AN ORGANIZED HEALTH CARE EDUCATION/TRAINING PROGRAM

## 2023-12-15 PROCEDURE — 83880 ASSAY OF NATRIURETIC PEPTIDE: CPT | Mod: HCNC | Performed by: EMERGENCY MEDICINE

## 2023-12-15 PROCEDURE — 85025 COMPLETE CBC W/AUTO DIFF WBC: CPT | Mod: HCNC | Performed by: EMERGENCY MEDICINE

## 2023-12-15 PROCEDURE — P9021 RED BLOOD CELLS UNIT: HCPCS | Mod: HCNC | Performed by: EMERGENCY MEDICINE

## 2023-12-15 PROCEDURE — 93010 EKG 12-LEAD: ICD-10-PCS | Mod: HCNC,,, | Performed by: INTERNAL MEDICINE

## 2023-12-15 PROCEDURE — 1126F AMNT PAIN NOTED NONE PRSNT: CPT | Mod: HCNC,CPTII,S$GLB, | Performed by: STUDENT IN AN ORGANIZED HEALTH CARE EDUCATION/TRAINING PROGRAM

## 2023-12-15 PROCEDURE — 31238 PR NASAL/SINUS ENDOSCOPY,W/CONTROL NASAL HEM: ICD-10-PCS | Mod: HCNC,RT,S$GLB, | Performed by: STUDENT IN AN ORGANIZED HEALTH CARE EDUCATION/TRAINING PROGRAM

## 2023-12-15 PROCEDURE — G0378 HOSPITAL OBSERVATION PER HR: HCPCS | Mod: HCNC

## 2023-12-15 PROCEDURE — 3072F PR LOW RISK FOR RETINOPATHY: ICD-10-PCS | Mod: HCNC,CPTII,S$GLB, | Performed by: STUDENT IN AN ORGANIZED HEALTH CARE EDUCATION/TRAINING PROGRAM

## 2023-12-15 PROCEDURE — 99999 PR PBB SHADOW E&M-EST. PATIENT-LVL III: CPT | Mod: PBBFAC,HCNC,, | Performed by: STUDENT IN AN ORGANIZED HEALTH CARE EDUCATION/TRAINING PROGRAM

## 2023-12-15 PROCEDURE — 99285 EMERGENCY DEPT VISIT HI MDM: CPT | Mod: 25,HCNC

## 2023-12-15 PROCEDURE — 1159F MED LIST DOCD IN RCRD: CPT | Mod: HCNC,CPTII,S$GLB, | Performed by: STUDENT IN AN ORGANIZED HEALTH CARE EDUCATION/TRAINING PROGRAM

## 2023-12-15 PROCEDURE — 93005 ELECTROCARDIOGRAM TRACING: CPT | Mod: HCNC

## 2023-12-15 PROCEDURE — 3077F PR MOST RECENT SYSTOLIC BLOOD PRESSURE >= 140 MM HG: ICD-10-PCS | Mod: HCNC,CPTII,S$GLB, | Performed by: STUDENT IN AN ORGANIZED HEALTH CARE EDUCATION/TRAINING PROGRAM

## 2023-12-15 PROCEDURE — 25000003 PHARM REV CODE 250: Mod: HCNC | Performed by: INTERNAL MEDICINE

## 2023-12-15 PROCEDURE — 85730 THROMBOPLASTIN TIME PARTIAL: CPT | Mod: HCNC | Performed by: EMERGENCY MEDICINE

## 2023-12-15 PROCEDURE — 3078F PR MOST RECENT DIASTOLIC BLOOD PRESSURE < 80 MM HG: ICD-10-PCS | Mod: HCNC,CPTII,S$GLB, | Performed by: STUDENT IN AN ORGANIZED HEALTH CARE EDUCATION/TRAINING PROGRAM

## 2023-12-15 PROCEDURE — 3078F DIAST BP <80 MM HG: CPT | Mod: HCNC,CPTII,S$GLB, | Performed by: STUDENT IN AN ORGANIZED HEALTH CARE EDUCATION/TRAINING PROGRAM

## 2023-12-15 PROCEDURE — 3072F LOW RISK FOR RETINOPATHY: CPT | Mod: HCNC,CPTII,S$GLB, | Performed by: STUDENT IN AN ORGANIZED HEALTH CARE EDUCATION/TRAINING PROGRAM

## 2023-12-15 PROCEDURE — 63600175 PHARM REV CODE 636 W HCPCS: Mod: HCNC | Performed by: EMERGENCY MEDICINE

## 2023-12-15 PROCEDURE — 86850 RBC ANTIBODY SCREEN: CPT | Mod: HCNC | Performed by: EMERGENCY MEDICINE

## 2023-12-15 PROCEDURE — 1159F PR MEDICATION LIST DOCUMENTED IN MEDICAL RECORD: ICD-10-PCS | Mod: HCNC,CPTII,S$GLB, | Performed by: STUDENT IN AN ORGANIZED HEALTH CARE EDUCATION/TRAINING PROGRAM

## 2023-12-15 PROCEDURE — 80053 COMPREHEN METABOLIC PANEL: CPT | Mod: HCNC | Performed by: EMERGENCY MEDICINE

## 2023-12-15 PROCEDURE — 85610 PROTHROMBIN TIME: CPT | Mod: HCNC | Performed by: EMERGENCY MEDICINE

## 2023-12-15 PROCEDURE — 36430 TRANSFUSION BLD/BLD COMPNT: CPT | Mod: HCNC

## 2023-12-15 PROCEDURE — 1160F RVW MEDS BY RX/DR IN RCRD: CPT | Mod: HCNC,CPTII,S$GLB, | Performed by: STUDENT IN AN ORGANIZED HEALTH CARE EDUCATION/TRAINING PROGRAM

## 2023-12-15 PROCEDURE — 99214 PR OFFICE/OUTPT VISIT, EST, LEVL IV, 30-39 MIN: ICD-10-PCS | Mod: 25,HCNC,S$GLB, | Performed by: STUDENT IN AN ORGANIZED HEALTH CARE EDUCATION/TRAINING PROGRAM

## 2023-12-15 PROCEDURE — 1126F PR PAIN SEVERITY QUANTIFIED, NO PAIN PRESENT: ICD-10-PCS | Mod: HCNC,CPTII,S$GLB, | Performed by: STUDENT IN AN ORGANIZED HEALTH CARE EDUCATION/TRAINING PROGRAM

## 2023-12-15 PROCEDURE — 31238 NSL/SINS NDSC SRG NSL HEMRRG: CPT | Mod: HCNC,RT,S$GLB, | Performed by: STUDENT IN AN ORGANIZED HEALTH CARE EDUCATION/TRAINING PROGRAM

## 2023-12-15 PROCEDURE — 3077F SYST BP >= 140 MM HG: CPT | Mod: HCNC,CPTII,S$GLB, | Performed by: STUDENT IN AN ORGANIZED HEALTH CARE EDUCATION/TRAINING PROGRAM

## 2023-12-15 PROCEDURE — 93010 ELECTROCARDIOGRAM REPORT: CPT | Mod: HCNC,,, | Performed by: INTERNAL MEDICINE

## 2023-12-15 PROCEDURE — 99214 OFFICE O/P EST MOD 30 MIN: CPT | Mod: 25,HCNC,S$GLB, | Performed by: STUDENT IN AN ORGANIZED HEALTH CARE EDUCATION/TRAINING PROGRAM

## 2023-12-15 PROCEDURE — 1160F PR REVIEW ALL MEDS BY PRESCRIBER/CLIN PHARMACIST DOCUMENTED: ICD-10-PCS | Mod: HCNC,CPTII,S$GLB, | Performed by: STUDENT IN AN ORGANIZED HEALTH CARE EDUCATION/TRAINING PROGRAM

## 2023-12-15 RX ORDER — FERROUS GLUCONATE 324(37.5)
324 TABLET ORAL
Status: DISCONTINUED | OUTPATIENT
Start: 2023-12-16 | End: 2023-12-19 | Stop reason: HOSPADM

## 2023-12-15 RX ORDER — DEXTROSE 40 %
24 GEL (GRAM) ORAL
Status: DISCONTINUED | OUTPATIENT
Start: 2023-12-15 | End: 2023-12-19 | Stop reason: HOSPADM

## 2023-12-15 RX ORDER — ALLOPURINOL 100 MG/1
100 TABLET ORAL DAILY
Status: DISCONTINUED | OUTPATIENT
Start: 2023-12-15 | End: 2023-12-15

## 2023-12-15 RX ORDER — OMEGA-3/DHA/EPA/FISH OIL 300-1000MG
2 CAPSULE,DELAYED RELEASE (ENTERIC COATED) ORAL NIGHTLY
Status: DISCONTINUED | OUTPATIENT
Start: 2023-12-15 | End: 2023-12-15

## 2023-12-15 RX ORDER — TRAZODONE HYDROCHLORIDE 50 MG/1
50 TABLET ORAL NIGHTLY PRN
Status: DISCONTINUED | OUTPATIENT
Start: 2023-12-15 | End: 2023-12-15

## 2023-12-15 RX ORDER — DEXTROSE 40 %
16 GEL (GRAM) ORAL
Status: DISCONTINUED | OUTPATIENT
Start: 2023-12-15 | End: 2023-12-19 | Stop reason: HOSPADM

## 2023-12-15 RX ORDER — ACETAMINOPHEN 325 MG/1
650 TABLET ORAL EVERY 8 HOURS PRN
Status: DISCONTINUED | OUTPATIENT
Start: 2023-12-15 | End: 2023-12-19 | Stop reason: HOSPADM

## 2023-12-15 RX ORDER — OXYMETAZOLINE HCL 0.05 %
2 SPRAY, NON-AEROSOL (ML) NASAL 2 TIMES DAILY PRN
Status: DISCONTINUED | OUTPATIENT
Start: 2023-12-15 | End: 2023-12-15

## 2023-12-15 RX ORDER — ALLOPURINOL 100 MG/1
100 TABLET ORAL DAILY
Status: DISCONTINUED | OUTPATIENT
Start: 2023-12-15 | End: 2023-12-19 | Stop reason: HOSPADM

## 2023-12-15 RX ORDER — ONDANSETRON 2 MG/ML
4 INJECTION INTRAMUSCULAR; INTRAVENOUS EVERY 8 HOURS PRN
Status: DISCONTINUED | OUTPATIENT
Start: 2023-12-15 | End: 2023-12-15

## 2023-12-15 RX ORDER — MORPHINE SULFATE 2 MG/ML
2 INJECTION, SOLUTION INTRAMUSCULAR; INTRAVENOUS EVERY 4 HOURS PRN
Status: DISCONTINUED | OUTPATIENT
Start: 2023-12-15 | End: 2023-12-15

## 2023-12-15 RX ORDER — POLYETHYLENE GLYCOL 3350 17 G/17G
17 POWDER, FOR SOLUTION ORAL DAILY PRN
Status: DISCONTINUED | OUTPATIENT
Start: 2023-12-15 | End: 2023-12-19 | Stop reason: HOSPADM

## 2023-12-15 RX ORDER — TRAZODONE HYDROCHLORIDE 50 MG/1
50 TABLET ORAL NIGHTLY PRN
Status: DISCONTINUED | OUTPATIENT
Start: 2023-12-15 | End: 2023-12-19 | Stop reason: HOSPADM

## 2023-12-15 RX ORDER — ATORVASTATIN CALCIUM 40 MG/1
80 TABLET, FILM COATED ORAL NIGHTLY
Status: DISCONTINUED | OUTPATIENT
Start: 2023-12-15 | End: 2023-12-15

## 2023-12-15 RX ORDER — ISOSORBIDE MONONITRATE 60 MG/1
60 TABLET, EXTENDED RELEASE ORAL NIGHTLY
Status: DISCONTINUED | OUTPATIENT
Start: 2023-12-15 | End: 2023-12-15

## 2023-12-15 RX ORDER — HYDROCODONE BITARTRATE AND ACETAMINOPHEN 500; 5 MG/1; MG/1
TABLET ORAL
Status: DISCONTINUED | OUTPATIENT
Start: 2023-12-15 | End: 2023-12-18

## 2023-12-15 RX ORDER — ONDANSETRON 2 MG/ML
4 INJECTION INTRAMUSCULAR; INTRAVENOUS EVERY 8 HOURS PRN
Status: DISCONTINUED | OUTPATIENT
Start: 2023-12-15 | End: 2023-12-19 | Stop reason: HOSPADM

## 2023-12-15 RX ORDER — HYDROCODONE BITARTRATE AND ACETAMINOPHEN 5; 325 MG/1; MG/1
1 TABLET ORAL EVERY 4 HOURS PRN
Status: DISCONTINUED | OUTPATIENT
Start: 2023-12-15 | End: 2023-12-15

## 2023-12-15 RX ORDER — OXYMETAZOLINE HCL 0.05 %
2 SPRAY, NON-AEROSOL (ML) NASAL 2 TIMES DAILY PRN
Status: DISPENSED | OUTPATIENT
Start: 2023-12-15 | End: 2023-12-18

## 2023-12-15 RX ORDER — SODIUM CHLORIDE 0.9 % (FLUSH) 0.9 %
10 SYRINGE (ML) INJECTION EVERY 12 HOURS PRN
Status: DISCONTINUED | OUTPATIENT
Start: 2023-12-15 | End: 2023-12-19 | Stop reason: HOSPADM

## 2023-12-15 RX ORDER — ATORVASTATIN CALCIUM 40 MG/1
80 TABLET, FILM COATED ORAL NIGHTLY
Status: DISCONTINUED | OUTPATIENT
Start: 2023-12-15 | End: 2023-12-19 | Stop reason: HOSPADM

## 2023-12-15 RX ORDER — OMEGA-3/DHA/EPA/FISH OIL 300-1000MG
1 CAPSULE,DELAYED RELEASE (ENTERIC COATED) ORAL NIGHTLY
Status: DISCONTINUED | OUTPATIENT
Start: 2023-12-16 | End: 2023-12-19 | Stop reason: HOSPADM

## 2023-12-15 RX ORDER — IBUPROFEN 200 MG
16 TABLET ORAL
Status: DISCONTINUED | OUTPATIENT
Start: 2023-12-15 | End: 2023-12-15

## 2023-12-15 RX ORDER — AMOXICILLIN AND CLAVULANATE POTASSIUM 500; 125 MG/1; MG/1
1 TABLET, FILM COATED ORAL 2 TIMES DAILY
Status: DISCONTINUED | OUTPATIENT
Start: 2023-12-15 | End: 2023-12-18

## 2023-12-15 RX ORDER — IBUPROFEN 200 MG
24 TABLET ORAL
Status: DISCONTINUED | OUTPATIENT
Start: 2023-12-15 | End: 2023-12-15

## 2023-12-15 RX ORDER — BUMETANIDE 1 MG/1
1 TABLET ORAL EVERY OTHER DAY
Status: DISCONTINUED | OUTPATIENT
Start: 2023-12-16 | End: 2023-12-15

## 2023-12-15 RX ORDER — BUMETANIDE 1 MG/1
1 TABLET ORAL EVERY OTHER DAY
Status: DISCONTINUED | OUTPATIENT
Start: 2023-12-16 | End: 2023-12-19 | Stop reason: HOSPADM

## 2023-12-15 RX ORDER — METOPROLOL SUCCINATE 25 MG/1
25 TABLET, EXTENDED RELEASE ORAL DAILY
Status: DISCONTINUED | OUTPATIENT
Start: 2023-12-15 | End: 2023-12-19 | Stop reason: HOSPADM

## 2023-12-15 RX ORDER — MAG HYDROX/ALUMINUM HYD/SIMETH 200-200-20
30 SUSPENSION, ORAL (FINAL DOSE FORM) ORAL 4 TIMES DAILY PRN
Status: DISCONTINUED | OUTPATIENT
Start: 2023-12-15 | End: 2023-12-19 | Stop reason: HOSPADM

## 2023-12-15 RX ORDER — GLUCAGON 1 MG
1 KIT INJECTION
Status: DISCONTINUED | OUTPATIENT
Start: 2023-12-15 | End: 2023-12-19 | Stop reason: HOSPADM

## 2023-12-15 RX ORDER — TALC
6 POWDER (GRAM) TOPICAL NIGHTLY PRN
Status: DISCONTINUED | OUTPATIENT
Start: 2023-12-15 | End: 2023-12-19 | Stop reason: HOSPADM

## 2023-12-15 RX ORDER — PROCHLORPERAZINE EDISYLATE 5 MG/ML
5 INJECTION INTRAMUSCULAR; INTRAVENOUS EVERY 6 HOURS PRN
Status: DISCONTINUED | OUTPATIENT
Start: 2023-12-15 | End: 2023-12-19 | Stop reason: HOSPADM

## 2023-12-15 RX ORDER — ISOSORBIDE MONONITRATE 60 MG/1
60 TABLET, EXTENDED RELEASE ORAL NIGHTLY
Status: DISCONTINUED | OUTPATIENT
Start: 2023-12-15 | End: 2023-12-19 | Stop reason: HOSPADM

## 2023-12-15 RX ORDER — FERROUS GLUCONATE 324(38)MG
324 TABLET ORAL
Status: DISCONTINUED | OUTPATIENT
Start: 2023-12-15 | End: 2023-12-15

## 2023-12-15 RX ORDER — WARFARIN SODIUM 5 MG/1
5 TABLET ORAL DAILY
Status: DISCONTINUED | OUTPATIENT
Start: 2023-12-15 | End: 2023-12-15

## 2023-12-15 RX ORDER — AMOXICILLIN AND CLAVULANATE POTASSIUM 875; 125 MG/1; MG/1
1 TABLET, FILM COATED ORAL EVERY 12 HOURS
Status: DISCONTINUED | OUTPATIENT
Start: 2023-12-15 | End: 2023-12-15

## 2023-12-15 RX ORDER — NALOXONE HCL 0.4 MG/ML
0.02 VIAL (ML) INJECTION
Status: DISCONTINUED | OUTPATIENT
Start: 2023-12-15 | End: 2023-12-19 | Stop reason: HOSPADM

## 2023-12-15 RX ORDER — FUROSEMIDE 10 MG/ML
40 INJECTION INTRAMUSCULAR; INTRAVENOUS
Status: COMPLETED | OUTPATIENT
Start: 2023-12-15 | End: 2023-12-15

## 2023-12-15 RX ORDER — METOPROLOL SUCCINATE 25 MG/1
25 TABLET, EXTENDED RELEASE ORAL DAILY
Status: DISCONTINUED | OUTPATIENT
Start: 2023-12-15 | End: 2023-12-15

## 2023-12-15 RX ORDER — SODIUM CHLORIDE 0.9 % (FLUSH) 0.9 %
10 SYRINGE (ML) INJECTION
Status: DISCONTINUED | OUTPATIENT
Start: 2023-12-15 | End: 2023-12-19 | Stop reason: HOSPADM

## 2023-12-15 RX ORDER — ACETAMINOPHEN 325 MG/1
650 TABLET ORAL EVERY 6 HOURS PRN
Status: DISCONTINUED | OUTPATIENT
Start: 2023-12-15 | End: 2023-12-15

## 2023-12-15 RX ADMIN — Medication 2 CAPSULE: at 08:12

## 2023-12-15 RX ADMIN — ISOSORBIDE MONONITRATE 60 MG: 60 TABLET, EXTENDED RELEASE ORAL at 08:12

## 2023-12-15 RX ADMIN — ALLOPURINOL 100 MG: 100 TABLET ORAL at 03:12

## 2023-12-15 RX ADMIN — AMOXICILLIN AND CLAVULANATE POTASSIUM 500 MG: 500; 125 TABLET, FILM COATED ORAL at 08:12

## 2023-12-15 RX ADMIN — ATORVASTATIN CALCIUM 80 MG: 40 TABLET, FILM COATED ORAL at 08:12

## 2023-12-15 RX ADMIN — FUROSEMIDE 40 MG: 10 INJECTION, SOLUTION INTRAVENOUS at 12:12

## 2023-12-15 RX ADMIN — Medication 6 MG: at 08:12

## 2023-12-15 RX ADMIN — METOPROLOL SUCCINATE 25 MG: 25 TABLET, EXTENDED RELEASE ORAL at 03:12

## 2023-12-15 NOTE — ASSESSMENT & PLAN NOTE
Patient with history of recurrent epistaxis while on warfarin. Has had 2 cauterizations with ENT, last one in November of this year. Seen in ENT clinic today for continued bleeding for past 2.5 weeks. S/p left Merocel placed in ENT clinic on 12/15. Now presenting with acute blood loss anemia requiring RBC transfusion in setting of supra-therapeutic warfarin (INR 5.2)   - ENT consulted, appreciate recs  - Sodium chloride nasal spray BL Q4H while awake  - Oxymetazoline nasal spray to left PRN for epistaxis  - Augmentin BID while nasal packing in place  - Will hold warfarin while supratherapeutic  - Daily INR's, pharmacy to assist in dosing  - ENT to plan for OR Monday 12/18 for nasal endoscopy and cauterization of epistaxis

## 2023-12-15 NOTE — H&P
[facility] - Emergency Dept  [facility] Medicine  History & Physical    Patient Name: Dariel Urbina  MRN: [id number]  Patient Class: OP- Observation  Admission Date: 12/15/2023  Attending Physician: Octavio Santos MD   Primary Care Provider: Devon Langston Jr., MD         Patient information was obtained from patient and ER records.     Subjective:     Principal Problem:Recurrent epistaxis    Chief Complaint:   Chief Complaint   Patient presents with    Epistaxis     +bloodthinner        HPI: Dariel Urbina is an 83 year old male with a past medical history of CAD s/p CABG 1990's, mechanical AVR (on warfarin) c/b mod-severe MR w/ mitraclip, afib, GIB 2/2 diverticulosis s/p partial colon resection, T2DM (diet controlled), gout, HLD, HTN, CKD4, and recurrent epistaxis on warfarin that presents with recurrent nose bleed. He reports he has had 2 surgeries with ENT in the past. His has had on and off again nose bleeds for the past 2 ½ weeks and decided to come in today because he was feeling more dyspneic. Per chart review, cauterization of left septum was performed on 11/3/23 which initially helped.  He was seen in ENT clinic today and had nasal packing placed and sent to [facility] ED for admission and workup as the patient has been feeling short of breath.     He reports being compliant with his home warfarin which he takes 5mg nightly. INR here was supratherapeutic at 5.2. Reports last dose of warfarin was yesterday. He also reports swelling in his lower extremities for the past week which is new for him. He denies F/C, N/V, chest pain, abdominal pain, dysuria.     Upon arrival to the ED, patient afebrile, HR 60s, -160's/70's, saturating 100% on RA. Labs notable for Hgb 6.9, Plt count 144, INR 5.2 (on warfarin, goal 2-3), Cr 2.8 (BL 1.8-2.8), trop 0.055 -> 0.047, .    Past Medical History:   Diagnosis Date    Anemia of chronic renal failure, stage 3 (moderate) 05/27/2015    Anticoagulant long-term use      Atherosclerosis of coronary artery bypass graft of native heart without angina pectoris 09/11/2012    3-27-18 [facility] Two vessel coronary artery disease.   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    Bilateral carotid artery disease 02/09/2017    Bleeding from the nose     Bleeding nose 03/21/2018    Cancer     Cataract     CHF (congestive heart failure)     CKD (chronic kidney disease) stage 3, GFR 30-59 ml/min 05/27/2015    Claudication of left lower extremity 09/17/2014    Colon polyp     Encounter for blood transfusion     Gastroesophageal reflux disease without esophagitis 03/19/2018    Gastrointestinal hemorrhage associated with intestinal diverticulosis 04/01/2018    Glaucoma     H/O mechanical aortic valve replacement 09/17/2014    History of gout 09/26/2012    Hyperparathyroidism due to renal insufficiency 07/27/2015    Internal hemorrhoid 04/03/2018    Long term current use of anticoagulant therapy 09/26/2012    Mechanical heart valve present     Metabolic acidosis with normal anion gap and bicarbonate losses 03/20/2018    Mixed hyperlipidemia 09/26/2012    NSTEMI (non-ST elevated myocardial infarction) 03/21/2018    Obesity, diabetes, and hypertension syndrome 02/23/2016    Paroxysmal atrial fibrillation 02/06/2023    PVD (peripheral vascular disease) 09/11/2012    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       Past Surgical History:   Procedure Laterality Date    BACK SURGERY      CARDIAC CATHETERIZATION      CARDIAC VALVE REPLACEMENT      CARDIAC VALVE SURGERY      CARPAL TUNNEL RELEASE Right 05/19/2020    Procedure: RELEASE, CARPAL TUNNEL;  Surgeon: Rupesh Norris Jr., MD;  Location:  [facility] OR;  Service: Plastics;  Laterality: Right;    CATARACT EXTRACTION Left 11/13/2022        COLON SURGERY      COLONOSCOPY N/A 03/31/2017    Procedure: COLONOSCOPY;  Surgeon: Bruno Raymond MD;  Location: [facility] ENDO (4TH FLR);  Service: Endoscopy;  Laterality: N/A;  Patient's wife requesting date.    COLONOSCOPY N/A 04/03/2018    Procedure: COLONOSCOPY;  Surgeon: Bonifacio Pelletier MD;  Location: [facility] ENDO (2ND FLR);  Service: Endoscopy;  Laterality: N/A;    COLONOSCOPY N/A 08/13/2018    Procedure: COLONOSCOPY;  Surgeon: Kam Barba MD;  Location: [facility] ENDO (2ND FLR);  Service: Endoscopy;  Laterality: N/A;  2nd floor: PA pressure 49; hx of moderate-severe valve disease     per Coumadin clinic-Patient can hold 5 days with lovenox bridge       ok to schedule per Katarina    CORONARY ANGIOGRAPHY N/A 10/04/2021    Procedure: Left heart cath +/- peripheral angiogram;  Surgeon: Jose Ruiz MD;  Location: [facility] CATH LAB;  Service: Cardiology;  Laterality: N/A;    CORONARY ANGIOGRAPHY N/A 3/2/2023    Procedure: Angiogram, Coronary;  Surgeon: Devon Garnica MD;  Location: [facility] CATH LAB;  Service: Cardiology;  Laterality: N/A;    CORONARY ANGIOPLASTY      CORONARY ARTERY BYPASS GRAFT      CORONARY BYPASS GRAFT ANGIOGRAPHY  10/04/2021    Procedure: Bypass graft study;  Surgeon: Jose Ruiz MD;  Location: [facility] CATH LAB;  Service: Cardiology;;    CORONARY BYPASS GRAFT ANGIOGRAPHY  3/2/2023    Procedure: Bypass graft study;  Surgeon: Devon Garnica MD;  Location: [facility] CATH LAB;  Service: Cardiology;;    ECHOCARDIOGRAM,TRANSESOPHAGEAL N/A 4/14/2023    Procedure: Transesophageal echo (KIRSTEN) intra-procedure log documentation;  Surgeon: [facility] Diagnostic Provider;  Location: [facility] EP LAB;  Service: Cardiology;  Laterality: N/A;    ENDOSCOPIC NASAL CAUTERIZATION Bilateral 11/3/2023    Procedure: CAUTERIZATION, NOSE, ENDOSCOPIC;  Surgeon: Jono uRssell MD;  Location: [facility] OR 2ND FLR;  Service: ENT;  Laterality:  Bilateral;    EYE SURGERY      FUNCTIONAL ENDOSCOPIC SINUS SURGERY (FESS) Bilateral 6/14/2023    Procedure: FESS (FUNCTIONAL ENDOSCOPIC SINUS SURGERY);  Surgeon: Jono Russell MD;  Location: [facility] OR 2ND FLR;  Service: ENT;  Laterality: Bilateral;    HERNIA REPAIR      INTRAOCULAR PROSTHESES INSERTION Left 11/13/2022    Procedure: INSERTION, IOL PROSTHESIS;  Surgeon: Alia Mckeon MD;  [facility];  Service: Ophthalmology;  Laterality: Left;    INTRAOCULAR PROSTHESES INSERTION Right 12/04/2022    Procedure: INSERTION, IOL PROSTHESIS;  Surgeon: Alia Mckeon MD;  [facility];  Service: Ophthalmology;  Laterality: Right;    LIGATION, ARTERY, SPHENOPALATINE, ENDOSCOPIC Bilateral 6/14/2023    Procedure: LIGATION, ARTERY, SPHENOPALATINE, ENDOSCOPIC;  Surgeon: Jono Russell MD;  Location: [facility] OR 2ND FLR;  Service: ENT;  Laterality: Bilateral;    PHACOEMULSIFICATION OF CATARACT Left 11/13/2022    Procedure: PHACOEMULSIFICATION, CATARACT;  Surgeon: Alia Mckeon MD;  Location: [facility] OR [address] [place] [address];  Service: Ophthalmology;  Laterality: Left;    PHACOEMULSIFICATION OF CATARACT Right 12/04/2022    Procedure: PHACOEMULSIFICATION, CATARACT;  Surgeon: Alia Mckoen MD;  Location: [facility] OR [address] [place] [address];  Service: Ophthalmology;  Laterality: Right;    SPINE SURGERY      TRANSESOPHAGEAL ECHOCARDIOGRAPHY N/A 3/22/2023    Procedure: ECHOCARDIOGRAM, TRANSESOPHAGEAL;  Surgeon: [facility] Diagnostic Provider;  Location: [facility] EP LAB;  Service: Anesthesiology;  Laterality: N/A;    TRANSESOPHAGEAL ECHOCARDIOGRAPHY N/A 4/11/2023    Procedure: ECHOCARDIOGRAM, TRANSESOPHAGEAL;  Surgeon: [facility] Diagnostic Provider;  Location: [facility] EP LAB;  Service: Anesthesiology;  Laterality: N/A;    VASECTOMY         Review of patient's allergies indicates:   Allergen Reactions    Lisinopril     Losartan      Intolerance- elevates potassium level       Current Facility-Administered Medications on File Prior to Encounter    Medication    ceFAZolin 2 g in dextrose 5 % in water (D5W) 50 mL IVPB (MB+)    HYDROmorphone injection 0.2 mg    sodium chloride 0.9% flush 10 mL    [DISCONTINUED] haloperidol lactate injection 0.5 mg     Current Outpatient Medications on File Prior to Encounter   Medication Sig    acetaminophen (TYLENOL) 500 MG tablet Take 500 mg by mouth daily as needed for Pain.    allopurinoL (ZYLOPRIM) 100 MG tablet Take 1 tablet (100 mg total) by mouth once daily.    bumetanide (BUMEX) 1 MG tablet Take 1 tablet (1 mg total) by mouth every other day.    ferrous gluconate (FERGON) 324 MG tablet Take 1 tablet (324 mg total) by mouth daily with breakfast.    isosorbide mononitrate (IMDUR) 60 MG 24 hr tablet Take 1 tablet (60 mg total) by mouth every evening.    loperamide (IMODIUM) 2 mg capsule Take 2 mg by mouth 4 (four) times daily as needed for Diarrhea.    metoprolol succinate (TOPROL-XL) 25 MG 24 hr tablet Take 1 tablet (25 mg total) by mouth once daily.    omega-3 fatty acids/fish oil (FISH OIL-OMEGA-3 FATTY ACIDS) 300-1,000 mg capsule Take 1 capsule by mouth once daily.    oxymetazoline (AFRIN) 0.05 % nasal spray Use 2 sprays by Nasal route daily as needed (nose bleed). (Patient taking differently: 2 sprays by Nasal route every 2 (two) hours as needed (nose bleed).)    rosuvastatin (CRESTOR) 40 MG Tab TAKE 1 TABLET EVERY EVENING.    traZODone (DESYREL) 50 MG tablet Take 1 tablet (50 mg total) by mouth nightly as needed for Insomnia.    warfarin (COUMADIN) 5 MG tablet Take warfarin 7.5mg (1.5 tablets) PO Tuesday & Saturday, then take 5mg PO all other days or as instructed by Coumadin Clinic    [DISCONTINUED] amoxicillin-clavulanate 875-125mg (AUGMENTIN) 875-125 mg per tablet Take 1 tablet by mouth every 12 (twelve) hours.    [DISCONTINUED] diphenhydrAMINE (BENADRYL) 25 mg capsule Take 50 mg by mouth nightly as needed for Insomnia.     Family History       Problem Relation (Age of Onset)    Alcohol abuse Father    Diabetes  Brother    Heart disease Mother, Father, Brother, Sister    Heart failure Mother, Father, Brother    No Known Problems Sister, Maternal Grandmother, Maternal Grandfather, Paternal Grandmother, Paternal Grandfather, Maternal Aunt, Maternal Uncle, Paternal Aunt, Paternal Uncle          Tobacco Use    Smoking status: Former     Current packs/day: 0.00     Average packs/day: 1 pack/day for 20.0 years (20.0 ttl pk-yrs)     Types: Cigarettes     Start date: 1960     Quit date: 1980     Years since quittin.2     Passive exposure: Never    Smokeless tobacco: Never   Substance and Sexual Activity    Alcohol use: No    Drug use: No    Sexual activity: Not Currently     Partners: Female     ROS  General ROS: negative for weight loss, weight gain, fevers, chills, night sweats  ENT ROS: See HPI  Ophthalmic ROS: negative for blurry vision, decreased vision, double vision or itchy eyes  Cardiovascular ROS: negative for chest pain or dyspnea on exertion  Respiratory ROS: See HPI  Gastrointestinal ROS: negative for abdominal pain, change in bowel habits, black or bloody stools, nausea, emesis, or bloating  Genito-Urinary ROS: negative for dysuria, trouble voiding, or hematuria  Neurological ROS: negative for TIA or stroke symptoms  Endocrine ROS: negative for hair pattern changes or unexpected weight changes  Musculoskeletal ROS: negative for muscle pain, weakness, joint pain or joint swelling  Skin: negative for rash, wounds, skin breakdown, or issues otherwise  Hematological and Lymphatic ROS: negative for bleeding, bruising, swollen lymph nodes  Psychological ROS: negative for anxiety or depression    Objective:     Vital Signs (Most Recent):  Temp: 97.9 °F (36.6 °C) (12/15/23 0948)  Pulse: 63 (12/15/23 1333)  Resp: 18 (12/15/23 1333)  BP: (!) 178/72 (12/15/23 1333)  SpO2: 100 % (12/15/23 1333) Vital Signs (24h Range):  Temp:  [97.4 °F (36.3 °C)-97.9 °F (36.6 °C)] 97.9 °F (36.6 °C)  Pulse:  [55-67] 63  Resp:   [15-20] 18  SpO2:  [94 %-100 %] 100 %  BP: (141-178)/(55-74) 178/72     Weight: 78 kg (172 lb)  Body mass index is 28.62 kg/m².     Physical Exam  Gen: in NAD, appears stated age  Neuro: AAOx4, CN2-12 grossly intact BL; motor, sensory, and strength grossly intact BL  HEENT: Dried blood around nares, packing in place, no active bleeding noted. NTNC, EOMI, PERRLA, MMM; no thyromegaly or lymphadenopathy; no JVD appreciated  CVS: Mechanical click noted, normal rate, S1/S2 auscultated with no S3 or S4; capillary refill < 2 sec  Resp: lungs CTAB, no w/r/r; no belabored breathing or accessory muscle use appreciated   Abd: BS+ in all 4 quadrants; NTND, soft to palpation; no organomegaly appreciated   Extrem: 1+ ALEXANDRA BL             Significant Labs: All pertinent labs within the past 24 hours have been reviewed.    Significant Imaging: I have reviewed all pertinent imaging results/findings within the past 24 hours.  Assessment/Plan:     * Recurrent epistaxis  Patient with history of recurrent epistaxis while on warfarin. Has had 2 cauterizations with ENT, last one in November of this year. Seen in ENT clinic today for continued bleeding for past 2.5 weeks. S/p left Merocel placed in ENT clinic on 12/15. Now presenting with acute blood loss anemia requiring RBC transfusion in setting of supra-therapeutic warfarin (INR 5.2)   - ENT consulted, appreciate recs  - Sodium chloride nasal spray BL Q4H while awake  - Oxymetazoline nasal spray to left PRN for epistaxis  - Augmentin BID while nasal packing in place  - Will hold warfarin while supratherapeutic  - Daily INR's, pharmacy to assist in dosing  - ENT to plan for OR Monday 12/18 for nasal endoscopy and cauterization of epistaxis    Acute blood loss anemia  Patient's anemia is currently controlled. Has received 1 units of PRBCs on 1 . Etiology likely d/t acute blood loss which was from epistaxis  Current CBC reviewed-   Lab Results   Component Value Date    HGB 6.9 (L)  12/15/2023    HCT 22.0 (L) 12/15/2023   - Transfuse to goal Hgb > 7  - Monitor serial CBC and transfuse if patient becomes hemodynamically unstable, symptomatic or H/H drops below 7/21.  - Daily INR's,  - Holding warfarin; goal INR 2-3    Supratherapeutic INR  - INR 5.2 on admission  - Goal INR 2-3  - Holding warfarin, deferring Vitamin K as not actively bleeding  - Pharmacy consulted for warfarin dosing    Chronic combined systolic and diastolic heart failure  Chronic history, now appears to be recovered as most recent TTE 4/2023 with EF 60%, moderate MR. Saturating well on RA. Lung sounds clear. Notable for 1+ BL ALEXANDRA which is new for him. 's on admission  - S/p IV 40mg Lasix in ED  - Strict I/O's, daily volume assessments  - Continue home bumex, Imdur, metoprolol    CKD (chronic kidney disease), stage IV  Creatine stable for now. BMP reviewed- noted Estimated Creatinine Clearance: 19.6 mL/min (A) (based on SCr of 2.8 mg/dL (H)). according to latest data. 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   - BL creatinine 1.8-2.8. Continues to have good UOP, no electrolyte abnormalities.  - Monitor UOP and serial BMP and adjust therapy as needed.   - Renally dose meds.   - Avoid nephrotoxic medications and procedures.    Type 2 diabetes mellitus with diabetic peripheral angiopathy without gangrene  - History of T2DM, Latest A1c 6.1  - Diet controlled, not on home DM meds  - SSI here  - POCT glucose checks as ordered  - Hypoglycemic protocol in effect    Paroxysmal atrial fibrillation  - Currently in a fib stable rate  - Continue home regimen of metop for rate control  - Supra-therapeutic, holding warfarin  - Will continue to monitor on tele    Coronary artery disease involving native coronary artery of native heart without angina pectoris  History of CAD s/p CABG in 1990's  - Continue statin    History of gout  - continue allopurinol      Renovascular hypertension  - Working to optimize BP control  -  Continue home regimen of Imdur, metop  - Will continue to monitor and further titrate antihypertensives as clinically indicated     Hyperlipidemia associated with type 2 diabetes mellitus  - continue home statin        VTE Risk Mitigation (From admission, onward)           Ordered     IP VTE HIGH RISK PATIENT  Once         12/15/23 1342     Place sequential compression device  Until discontinued         12/15/23 1342     Place sequential compression device  Until discontinued         12/15/23 1341                       On 12/15/2023, patient should be placed in hospital observation services under my care.             Octavio Santos MD  Department of Hospital Medicine  [facility] - Emergency Dept

## 2023-12-15 NOTE — ASSESSMENT & PLAN NOTE
- History of T2DM, Latest A1c 6.1  - Diet controlled, not on home DM meds  - SSI here  - POCT glucose checks as ordered  - Hypoglycemic protocol in effect

## 2023-12-15 NOTE — Clinical Note
Diagnosis: Fatigue [334275]  Future Attending Provider: ISRAEL TANG [[id number]]  Admitting Provider:: ISRAEL TANG [[id number]]  Bed request comments: JITENDRA  Reason for IP Medical Treatment  (Clinical interventions that can only be accomplished in the  IP setting? ) :: epistaxis  I certify that Inpatient services for greater than or equal to 2 midnights are medically necessary:: Yes  Plans for Post-Acute care--if anticipated (pick the single best option):: A. No post acute care anticipated at this  time

## 2023-12-15 NOTE — ASSESSMENT & PLAN NOTE
83M with pAF, mechanical aortic valve (on warfarin), and recurrent epistaxis s/p multiple procedures. Left Merocel placed in ENT clinic on 12/15. Presented to ED for anemia in CKD setting. Anticipation of OR on 12/18.     -- Sodium chloride nasal spray bilaterally Q4H while awake  -- Oxymetazoline nasal spray on the left PRN for epistaxis  -- Augmentin BID while nasal packing in place  -- To OR on Monday 12/18 for nasal endoscopy and cauterization of epistaxis  -- Page ENT with questions or concerns

## 2023-12-15 NOTE — H&P
[facility] - Emergency Dept  Otorhinolaryngology-Head & Neck Surgery  History & Physical    Patient Name: Dariel Urbina  MRN: [id number]  Admission Date: 12/15/2023  Attending Physician: Jono Russell MD   Primary Care Provider: Devon Langston Jr., MD    Patient information was obtained from patient, spouse/SO, and past medical records.     Subjective:     Chief Complaint/Reason for Admission: recurrent epistaxis    History of Present Illness: Mr. Urbina is an 83 year-old male with a history of pAF, mechanical aortic valve (on warfarin), and recurrent epistaxis s/p bilateral endoscopic sphenopalatine artery ligation on 6/14/23, left sphenopalatine artery embolization on 10/5/23, and nasal endoscopy and cauterization on 11/3/23. He presented to the ENT clinic today with left epistaxis and was packed with Merocel packing. He was then sent to the ED for admission in anticipation for OR cauterization on Monday.     On my arrival patient is hemostatic. No further bleeding. Last warfarin dose yesterday night.     Medications:  Continuous Infusions:  Scheduled Meds:   allopurinoL  100 mg Oral Daily    atorvastatin  80 mg Oral QHS    [START ON 12/16/2023] bumetanide  1 mg Oral Every other day    ferrous gluconate  324 mg Oral Daily with breakfast    isosorbide mononitrate  60 mg Oral QHS    metoprolol succinate  25 mg Oral Daily    omega 3-dha-epa-fish oil  2 capsule Oral QHS    sodium chloride  2 spray Each Nostril Q4H While awake    warfarin  5 mg Oral Daily     PRN Meds:acetaminophen, HYDROcodone-acetaminophen, morphine, ondansetron, oxymetazoline, traZODone     Current Facility-Administered Medications on File Prior to Encounter   Medication    ceFAZolin 2 g in dextrose 5 % in water (D5W) 50 mL IVPB (MB+)    haloperidol lactate injection 0.5 mg    HYDROmorphone injection 0.2 mg    sodium chloride 0.9% flush 10 mL     Current Outpatient Medications on File Prior to Encounter   Medication Sig    acetaminophen  (TYLENOL) 500 MG tablet Take 500 mg by mouth daily as needed for Pain.    allopurinoL (ZYLOPRIM) 100 MG tablet Take 1 tablet (100 mg total) by mouth once daily.    bumetanide (BUMEX) 1 MG tablet Take 1 tablet (1 mg total) by mouth every other day.    ferrous gluconate (FERGON) 324 MG tablet Take 1 tablet (324 mg total) by mouth daily with breakfast.    isosorbide mononitrate (IMDUR) 60 MG 24 hr tablet Take 1 tablet (60 mg total) by mouth every evening.    loperamide (IMODIUM) 2 mg capsule Take 2 mg by mouth 4 (four) times daily as needed for Diarrhea.    metoprolol succinate (TOPROL-XL) 25 MG 24 hr tablet Take 1 tablet (25 mg total) by mouth once daily.    omega-3 fatty acids/fish oil (FISH OIL-OMEGA-3 FATTY ACIDS) 300-1,000 mg capsule Take 1 capsule by mouth once daily.    oxymetazoline (AFRIN) 0.05 % nasal spray Use 2 sprays by Nasal route daily as needed (nose bleed). (Patient taking differently: 2 sprays by Nasal route every 2 (two) hours as needed (nose bleed).)    rosuvastatin (CRESTOR) 40 MG Tab TAKE 1 TABLET EVERY EVENING.    traZODone (DESYREL) 50 MG tablet Take 1 tablet (50 mg total) by mouth nightly as needed for Insomnia.    warfarin (COUMADIN) 5 MG tablet Take warfarin 7.5mg (1.5 tablets) PO Tuesday & Saturday, then take 5mg PO all other days or as instructed by Coumadin Clinic    [DISCONTINUED] amoxicillin-clavulanate 875-125mg (AUGMENTIN) 875-125 mg per tablet Take 1 tablet by mouth every 12 (twelve) hours.    [DISCONTINUED] diphenhydrAMINE (BENADRYL) 25 mg capsule Take 50 mg by mouth nightly as needed for Insomnia.     Review of patient's allergies indicates:   Allergen Reactions    Lisinopril     Losartan      Intolerance- elevates potassium level     Past Medical History:   Diagnosis Date    Anemia of chronic renal failure, stage 3 (moderate) 05/27/2015    Anticoagulant long-term use     Atherosclerosis of coronary artery bypass graft of native heart without angina pectoris 09/11/2012    3-27-18  [facility] Two vessel coronary artery disease.   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    Bilateral carotid artery disease 02/09/2017    Bleeding from the nose     Bleeding nose 03/21/2018    Cancer     Cataract     CHF (congestive heart failure)     CKD (chronic kidney disease) stage 3, GFR 30-59 ml/min 05/27/2015    Claudication of left lower extremity 09/17/2014    Colon polyp     Encounter for blood transfusion     Gastroesophageal reflux disease without esophagitis 03/19/2018    Gastrointestinal hemorrhage associated with intestinal diverticulosis 04/01/2018    Glaucoma     H/O mechanical aortic valve replacement 09/17/2014    History of gout 09/26/2012    Hyperparathyroidism due to renal insufficiency 07/27/2015    Internal hemorrhoid 04/03/2018    Long term current use of anticoagulant therapy 09/26/2012    Mechanical heart valve present     Metabolic acidosis with normal anion gap and bicarbonate losses 03/20/2018    Mixed hyperlipidemia 09/26/2012    NSTEMI (non-ST elevated myocardial infarction) 03/21/2018    Obesity, diabetes, and hypertension syndrome 02/23/2016    Paroxysmal atrial fibrillation 02/06/2023    PVD (peripheral vascular disease) 09/11/2012    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     Past Surgical History:   Procedure Laterality Date    BACK SURGERY      CARDIAC CATHETERIZATION      CARDIAC VALVE REPLACEMENT      CARDIAC VALVE SURGERY      CARPAL TUNNEL RELEASE Right 05/19/2020    Procedure: RELEASE, CARPAL TUNNEL;  Surgeon: Rupesh Norris Jr., MD;  Location: [facility];  Service: Plastics;  Laterality: Right;    CATARACT EXTRACTION Left 11/13/2022    DR.KULLMAN GUEVARA  SURGERY      COLONOSCOPY N/A 03/31/2017    Procedure: COLONOSCOPY;  Surgeon: Bruno Raymond MD;  Location: [facility] ENDO (4TH FLR);  Service: Endoscopy;  Laterality: N/A;  Patient's wife requesting date.    COLONOSCOPY N/A 04/03/2018    Procedure: COLONOSCOPY;  Surgeon: Bonifacio Pelletier MD;  Location: [facility] ENDO (2ND FLR);  Service: Endoscopy;  Laterality: N/A;    COLONOSCOPY N/A 08/13/2018    Procedure: COLONOSCOPY;  Surgeon: Kam Barba MD;  Location: [facility] ENDO (2ND FLR);  Service: Endoscopy;  Laterality: N/A;  2nd floor: PA pressure 49; hx of moderate-severe valve disease     per Coumadin clinic-Patient can hold 5 days with lovenox bridge       ok to schedule per Katarina    CORONARY ANGIOGRAPHY N/A 10/04/2021    Procedure: Left heart cath +/- peripheral angiogram;  Surgeon: Jose Ruiz MD;  Location: [facility] CATH LAB;  Service: Cardiology;  Laterality: N/A;    CORONARY ANGIOGRAPHY N/A 3/2/2023    Procedure: Angiogram, Coronary;  Surgeon: Devon Garnica MD;  Location: [facility] CATH LAB;  Service: Cardiology;  Laterality: N/A;    CORONARY ANGIOPLASTY      CORONARY ARTERY BYPASS GRAFT      CORONARY BYPASS GRAFT ANGIOGRAPHY  10/04/2021    Procedure: Bypass graft study;  Surgeon: Jose Ruiz MD;  Location: [facility] CATH LAB;  Service: Cardiology;;    CORONARY BYPASS GRAFT ANGIOGRAPHY  3/2/2023    Procedure: Bypass graft study;  Surgeon: Devon Garnica MD;  Location: [facility] CATH LAB;  Service: Cardiology;;    ECHOCARDIOGRAM,TRANSESOPHAGEAL N/A 4/14/2023    Procedure: Transesophageal echo (KIRSTEN) intra-procedure log documentation;  Surgeon: [facility] Diagnostic Provider;  Location: [facility] EP LAB;  Service: Cardiology;  Laterality: N/A;    ENDOSCOPIC NASAL CAUTERIZATION Bilateral 11/3/2023    Procedure: CAUTERIZATION, NOSE, ENDOSCOPIC;  Surgeon: Jono Russell MD;  Location: [facility] OR 2ND FLR;  Service: ENT;  Laterality: Bilateral;    EYE SURGERY      FUNCTIONAL ENDOSCOPIC SINUS SURGERY (FESS) Bilateral 6/14/2023    Procedure:  FESS (FUNCTIONAL ENDOSCOPIC SINUS SURGERY);  Surgeon: Jono Russell MD;  Location: [facility] OR 2ND FLR;  Service: ENT;  Laterality: Bilateral;    HERNIA REPAIR      INTRAOCULAR PROSTHESES INSERTION Left 11/13/2022    Procedure: INSERTION, IOL PROSTHESIS;  Surgeon: Alia Mckeon MD;  Location: [facility] OR 1ST FLR;  Service: Ophthalmology;  Laterality: Left;    INTRAOCULAR PROSTHESES INSERTION Right 12/04/2022    Procedure: INSERTION, IOL PROSTHESIS;  Surgeon: Alia Mckeon MD;  [facility];  Service: Ophthalmology;  Laterality: Right;    LIGATION, ARTERY, SPHENOPALATINE, ENDOSCOPIC Bilateral 6/14/2023    Procedure: LIGATION, ARTERY, SPHENOPALATINE, ENDOSCOPIC;  Surgeon: Jono Russell MD;  Location: [facility] OR 2ND FLR;  Service: ENT;  Laterality: Bilateral;    PHACOEMULSIFICATION OF CATARACT Left 11/13/2022    Procedure: PHACOEMULSIFICATION, CATARACT;  Surgeon: Alia Mckeon MD;  [facility];  Service: Ophthalmology;  Laterality: Left;    PHACOEMULSIFICATION OF CATARACT Right 12/04/2022    Procedure: PHACOEMULSIFICATION, CATARACT;  Surgeon: Alia Mckeon MD;  [facility];  Service: Ophthalmology;  Laterality: Right;    SPINE SURGERY      TRANSESOPHAGEAL ECHOCARDIOGRAPHY N/A 3/22/2023    Procedure: ECHOCARDIOGRAM, TRANSESOPHAGEAL;  Surgeon: Zenia Diagnostic Provider;  Location: [facility] EP LAB;  Service: Anesthesiology;  Laterality: N/A;    TRANSESOPHAGEAL ECHOCARDIOGRAPHY N/A 4/11/2023    Procedure: ECHOCARDIOGRAM, TRANSESOPHAGEAL;  Surgeon: Thuan Diagnostic Provider;  Location: [facility] EP LAB;  Service: Anesthesiology;  Laterality: N/A;    VASECTOMY       Family History       Problem Relation (Age of Onset)    Alcohol abuse Father    Diabetes Brother    Heart disease Mother, Father, Brother, Sister    Heart failure Mother, Father, Brother    No Known Problems Sister, Maternal Grandmother, Maternal Grandfather, Paternal Grandmother, Paternal Grandfather, Maternal Aunt,  Maternal Uncle, Paternal Aunt, Paternal Uncle          Tobacco Use    Smoking status: Former     Current packs/day: 0.00     Average packs/day: 1 pack/day for 20.0 years (20.0 ttl pk-yrs)     Types: Cigarettes     Start date: 1960     Quit date: 1980     Years since quittin.2     Passive exposure: Never    Smokeless tobacco: Never   Substance and Sexual Activity    Alcohol use: No    Drug use: No    Sexual activity: Not Currently     Partners: Female     Review of Systems  Objective:     Vital Signs (Most Recent):  Temp: 97.9 °F (36.6 °C) (12/15/23 0948)  Pulse: (!) 55 (12/15/23 0948)  Resp: 15 (12/15/23 0948)  BP: (!) 149/66 (12/15/23 0948)  SpO2: 98 % (12/15/23 0948) Vital Signs (24h Range):  Temp:  [97.4 °F (36.3 °C)-97.9 °F (36.6 °C)] 97.9 °F (36.6 °C)  Pulse:  [55-67] 55  Resp:  [15-20] 15  SpO2:  [94 %-98 %] 98 %  BP: (141-149)/(55-66) 149/66     Weight: 78 kg (172 lb)  Body mass index is 28.62 kg/m².     Physical Exam  Resting comfortably on ED stretcher, in no acute distress  Right naris with no active bleeding  Left naris packed with Merocel, hemostatic  Oropharynx with no blood    Significant Labs:  None    Significant Diagnostics:  None  Assessment/Plan:     * Recurrent epistaxis  83M with pAF, mechanical aortic valve (on warfarin), and recurrent epistaxis s/p multiple procedures. Left Merocel placed in ENT clinic on 12/15. Will be admitted for OR cauterization on .       -- Admit to ENT under Dr. Jono Russell  -- Sodium chloride nasal spray bilaterally Q4H while awake  -- Oxymetazoline nasal spray on the left PRN for epistaxis  -- Augmentin while nasal packing in place  -- Resume home med; warfarin continued as well  -- To OR on  for nasal endoscopy and cauterization of epistaxis   -- Consented and witnessed by ED RN  -- Please Secure Chat me for any questions, page ENT on-call if unresponsive or urgent      VTE Risk Mitigation (From admission, onward)            Ordered     warfarin (COUMADIN) tablet 5 mg  Daily         12/15/23 1012     IP VTE LOW RISK PATIENT  Once         12/15/23 1012                  Tank Gomez MD  Otorhinolaryngology-Head & Neck Surgery  [facility] - Emergency Dept

## 2023-12-15 NOTE — HPI
Dariel Urbina is an 83 year old male with a past medical history of CAD s/p CABG 1990's, mechanical AVR (on warfarin) c/b mod-severe MR w/ mitraclip, afib, GIB 2/2 diverticulosis s/p partial colon resection, T2DM (diet controlled), gout, HLD, HTN, CKD4, and recurrent epistaxis on warfarin that presents with recurrent nose bleed. He reports he has had 2 surgeries with ENT in the past. His has had on and off again nose bleeds for the past 2 ½ weeks and decided to come in today because he was feeling more dyspneic. Per chart review, cauterization of left septum was performed on 11/3/23 which initially helped.  He was seen in ENT clinic today and had nasal packing placed and sent to [facility] ED for admission and workup as the patient has been feeling short of breath.     He reports being compliant with his home warfarin which he takes 5mg nightly. INR here was supratherapeutic at 5.2. Reports last dose of warfarin was yesterday. He also reports swelling in his lower extremities for the past week which is new for him. He denies F/C, N/V, chest pain, abdominal pain, dysuria.     Upon arrival to the ED, patient afebrile, HR 60s, -160's/70's, saturating 100% on RA. Labs notable for Hgb 6.9, Plt count 144, INR 5.2 (on warfarin, goal 2-3), Cr 2.8 (BL 1.8-2.8), trop 0.055 -> 0.047, .

## 2023-12-15 NOTE — CONSULTS
Patient Name: Dariel Urbina  MRN: [id number]  Admission Date: 12/15/2023  Attending Physician: Jono Russell MD   Primary Care Provider: Devon Langston Jr., MD     Patient information was obtained from patient, spouse/SO, and past medical records.      Subjective:      Chief Complaint/Reason for Consult: recurrent epistaxis     History of Present Illness: Mr. Urbina is an 83 year-old male with a history of pAF, mechanical aortic valve (on warfarin), and recurrent epistaxis s/p bilateral endoscopic sphenopalatine artery ligation on 6/14/23, left sphenopalatine artery embolization on 10/5/23, and nasal endoscopy and cauterization on 11/3/23. He presented to the ENT clinic today with left epistaxis and was packed with Merocel packing. He was then sent to the ED for admission to hospital medicine service.      On my arrival patient is hemostatic. No further bleeding. Last warfarin dose yesterday night.      Medications:  Continuous Infusions:  Scheduled Meds:   allopurinoL  100 mg Oral Daily    atorvastatin  80 mg Oral QHS    [START ON 12/16/2023] bumetanide  1 mg Oral Every other day    ferrous gluconate  324 mg Oral Daily with breakfast    isosorbide mononitrate  60 mg Oral QHS    metoprolol succinate  25 mg Oral Daily    omega 3-dha-epa-fish oil  2 capsule Oral QHS    sodium chloride  2 spray Each Nostril Q4H While awake    warfarin  5 mg Oral Daily      PRN Meds:acetaminophen, HYDROcodone-acetaminophen, morphine, ondansetron, oxymetazoline, traZODone          Current Facility-Administered Medications on File Prior to Encounter   Medication    ceFAZolin 2 g in dextrose 5 % in water (D5W) 50 mL IVPB (MB+)    haloperidol lactate injection 0.5 mg    HYDROmorphone injection 0.2 mg    sodium chloride 0.9% flush 10 mL           Current Outpatient Medications on File Prior to Encounter   Medication Sig    acetaminophen (TYLENOL) 500 MG tablet Take 500 mg by mouth daily as needed for Pain.    allopurinoL (ZYLOPRIM)  100 MG tablet Take 1 tablet (100 mg total) by mouth once daily.    bumetanide (BUMEX) 1 MG tablet Take 1 tablet (1 mg total) by mouth every other day.    ferrous gluconate (FERGON) 324 MG tablet Take 1 tablet (324 mg total) by mouth daily with breakfast.    isosorbide mononitrate (IMDUR) 60 MG 24 hr tablet Take 1 tablet (60 mg total) by mouth every evening.    loperamide (IMODIUM) 2 mg capsule Take 2 mg by mouth 4 (four) times daily as needed for Diarrhea.    metoprolol succinate (TOPROL-XL) 25 MG 24 hr tablet Take 1 tablet (25 mg total) by mouth once daily.    omega-3 fatty acids/fish oil (FISH OIL-OMEGA-3 FATTY ACIDS) 300-1,000 mg capsule Take 1 capsule by mouth once daily.    oxymetazoline (AFRIN) 0.05 % nasal spray Use 2 sprays by Nasal route daily as needed (nose bleed). (Patient taking differently: 2 sprays by Nasal route every 2 (two) hours as needed (nose bleed).)    rosuvastatin (CRESTOR) 40 MG Tab TAKE 1 TABLET EVERY EVENING.    traZODone (DESYREL) 50 MG tablet Take 1 tablet (50 mg total) by mouth nightly as needed for Insomnia.    warfarin (COUMADIN) 5 MG tablet Take warfarin 7.5mg (1.5 tablets) PO Tuesday & Saturday, then take 5mg PO all other days or as instructed by Coumadin Clinic    [DISCONTINUED] amoxicillin-clavulanate 875-125mg (AUGMENTIN) 875-125 mg per tablet Take 1 tablet by mouth every 12 (twelve) hours.    [DISCONTINUED] diphenhydrAMINE (BENADRYL) 25 mg capsule Take 50 mg by mouth nightly as needed for Insomnia.            Review of patient's allergies indicates:   Allergen Reactions    Lisinopril      Losartan         Intolerance- elevates potassium level           Past Medical History:   Diagnosis Date    Anemia of chronic renal failure, stage 3 (moderate) 05/27/2015    Anticoagulant long-term use      Atherosclerosis of coronary artery bypass graft of native heart without angina pectoris 09/11/2012     3-27-18 [facility] Two vessel coronary artery disease.   Prosthetic aortic valve.   Porcelain  aorta.   Patent LIMA graft.    Bilateral carotid artery disease 02/09/2017    Bleeding from the nose      Bleeding nose 03/21/2018    Cancer      Cataract      CHF (congestive heart failure)      CKD (chronic kidney disease) stage 3, GFR 30-59 ml/min 05/27/2015    Claudication of left lower extremity 09/17/2014    Colon polyp      Encounter for blood transfusion      Gastroesophageal reflux disease without esophagitis 03/19/2018    Gastrointestinal hemorrhage associated with intestinal diverticulosis 04/01/2018    Glaucoma      H/O mechanical aortic valve replacement 09/17/2014    History of gout 09/26/2012    Hyperparathyroidism due to renal insufficiency 07/27/2015    Internal hemorrhoid 04/03/2018    Long term current use of anticoagulant therapy 09/26/2012    Mechanical heart valve present      Metabolic acidosis with normal anion gap and bicarbonate losses 03/20/2018    Mixed hyperlipidemia 09/26/2012    NSTEMI (non-ST elevated myocardial infarction) 03/21/2018    Obesity, diabetes, and hypertension syndrome 02/23/2016    Paroxysmal atrial fibrillation 02/06/2023    PVD (peripheral vascular disease) 09/11/2012    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 02/01/2023     vertex scalp    Syncope 09/29/2022    Type 2 diabetes mellitus with diabetic peripheral angiopathy without gangrene 05/27/2015    Type 2 diabetes mellitus with stage 3 chronic kidney disease, without long-term current use of insulin 10/02/2013            Past Surgical History:   Procedure Laterality Date    BACK SURGERY        CARDIAC CATHETERIZATION        CARDIAC VALVE REPLACEMENT        CARDIAC VALVE SURGERY        CARPAL TUNNEL RELEASE Right 05/19/2020     Procedure: RELEASE, CARPAL TUNNEL;  Surgeon: Rupesh Norris Jr., MD;  Location: [facility];  Service: Plastics;  Laterality: Right;    CATARACT EXTRACTION Left 11/13/2022         COLON SURGERY        COLONOSCOPY N/A 03/31/2017      Procedure: COLONOSCOPY;  Surgeon: Bruno Raymond MD;  Location: [facility] (4TH FLR);  Service: Endoscopy;  Laterality: N/A;  Patient's wife requesting date.    COLONOSCOPY N/A 04/03/2018     Procedure: COLONOSCOPY;  Surgeon: Bonifacio Pelletier MD;  Location: [facility] ENDO (2ND FLR);  Service: Endoscopy;  Laterality: N/A;    COLONOSCOPY N/A 08/13/2018     Procedure: COLONOSCOPY;  Surgeon: Kam Barba MD;  Location: [facility] ENDO (2ND FLR);  Service: Endoscopy;  Laterality: N/A;  2nd floor: PA pressure 49; hx of moderate-severe valve disease     per Coumadin clinic-Patient can hold 5 days with lovenox bridge       ok to schedule per Katarina    CORONARY ANGIOGRAPHY N/A 10/04/2021     Procedure: Left heart cath +/- peripheral angiogram;  Surgeon: Jose Ruiz MD;  Location: [facility] CATH LAB;  Service: Cardiology;  Laterality: N/A;    CORONARY ANGIOGRAPHY N/A 3/2/2023     Procedure: Angiogram, Coronary;  Surgeon: Devon Garnica MD;  Location: [facility] CATH LAB;  Service: Cardiology;  Laterality: N/A;    CORONARY ANGIOPLASTY        CORONARY ARTERY BYPASS GRAFT        CORONARY BYPASS GRAFT ANGIOGRAPHY   10/04/2021     Procedure: Bypass graft study;  Surgeon: oJse Ruiz MD;  Location: [facility] CATH LAB;  Service: Cardiology;;    CORONARY BYPASS GRAFT ANGIOGRAPHY   3/2/2023     Procedure: Bypass graft study;  Surgeon: Devon Garnica MD;  Location: [facility] CATH LAB;  Service: Cardiology;;    ECHOCARDIOGRAM,TRANSESOPHAGEAL N/A 4/14/2023     Procedure: Transesophageal echo (KIRSTEN) intra-procedure log documentation;  Surgeon: [facility] Diagnostic Provider;  Location: [facility] EP LAB;  Service: Cardiology;  Laterality: N/A;    ENDOSCOPIC NASAL CAUTERIZATION Bilateral 11/3/2023     Procedure: CAUTERIZATION, NOSE, ENDOSCOPIC;  Surgeon: Jono Russell MD;  Location: [facility] OR 2ND FLR;  Service: ENT;  Laterality: Bilateral;    EYE SURGERY        FUNCTIONAL ENDOSCOPIC SINUS SURGERY (FESS) Bilateral 6/14/2023     Procedure: FESS (FUNCTIONAL ENDOSCOPIC  SINUS SURGERY);  Surgeon: Jono Russell MD;  Location: [facility] OR 2ND FLR;  Service: ENT;  Laterality: Bilateral;    HERNIA REPAIR        INTRAOCULAR PROSTHESES INSERTION Left 11/13/2022     Procedure: INSERTION, IOL PROSTHESIS;  Surgeon: Alia Mckeon MD;  Location: [facility] OR 1ST FLR;  Service: Ophthalmology;  Laterality: Left;    INTRAOCULAR PROSTHESES INSERTION Right 12/04/2022     Procedure: INSERTION, IOL PROSTHESIS;  Surgeon: Alia Mckeon MD;  Location: [facility] OR 1ST FLR;  Service: Ophthalmology;  Laterality: Right;    LIGATION, ARTERY, SPHENOPALATINE, ENDOSCOPIC Bilateral 6/14/2023     Procedure: LIGATION, ARTERY, SPHENOPALATINE, ENDOSCOPIC;  Surgeon: Jono Russell MD;  Location: [facility] OR 2ND FLR;  Service: ENT;  Laterality: Bilateral;    PHACOEMULSIFICATION OF CATARACT Left 11/13/2022     Procedure: PHACOEMULSIFICATION, CATARACT;  Surgeon: Alia Mckeon MD;  [facility];  Service: Ophthalmology;  Laterality: Left;    PHACOEMULSIFICATION OF CATARACT Right 12/04/2022     Procedure: PHACOEMULSIFICATION, CATARACT;  Surgeon: Alia Mckeon MD;  [facility];  Service: Ophthalmology;  Laterality: Right;    SPINE SURGERY        TRANSESOPHAGEAL ECHOCARDIOGRAPHY N/A 3/22/2023     Procedure: ECHOCARDIOGRAM, TRANSESOPHAGEAL;  Surgeon: Thuan Diagnostic Provider;  Location: [facility] EP LAB;  Service: Anesthesiology;  Laterality: N/A;    TRANSESOPHAGEAL ECHOCARDIOGRAPHY N/A 4/11/2023     Procedure: ECHOCARDIOGRAM, TRANSESOPHAGEAL;  Surgeon: Thuan Diagnostic Provider;  Location: [facility] EP LAB;  Service: Anesthesiology;  Laterality: N/A;    VASECTOMY          Family History         Problem Relation (Age of Onset)     Alcohol abuse Father     Diabetes Brother     Heart disease Mother, Father, Brother, Sister     Heart failure Mother, Father, Brother     No Known Problems Sister, Maternal Grandmother, Maternal Grandfather, Paternal Grandmother, Paternal Grandfather, Maternal Aunt, Maternal  Uncle, Paternal Aunt, Paternal Uncle                   Tobacco Use    Smoking status: Former       Current packs/day: 0.00       Average packs/day: 1 pack/day for 20.0 years (20.0 ttl pk-yrs)       Types: Cigarettes       Start date: 1960       Quit date: 1980       Years since quittin.2       Passive exposure: Never    Smokeless tobacco: Never   Substance and Sexual Activity    Alcohol use: No    Drug use: No    Sexual activity: Not Currently       Partners: Female      Review of Systems  Objective:      Vital Signs (Most Recent):  Temp: 97.9 °F (36.6 °C) (12/15/23 0948)  Pulse: (!) 55 (12/15/23 0948)  Resp: 15 (12/15/23 0948)  BP: (!) 149/66 (12/15/23 0948)  SpO2: 98 % (12/15/23 0948) Vital Signs (24h Range):  Temp:  [97.4 °F (36.3 °C)-97.9 °F (36.6 °C)] 97.9 °F (36.6 °C)  Pulse:  [55-67] 55  Resp:  [15-20] 15  SpO2:  [94 %-98 %] 98 %  BP: (141-149)/(55-66) 149/66      Weight: 78 kg (172 lb)  Body mass index is 28.62 kg/m².     Physical Exam  Resting comfortably on ED stretcher, in no acute distress  Right naris with no active bleeding  Left naris packed with Merocel, hemostatic  Oropharynx with no blood     Significant Labs:  None     Significant Diagnostics:  None  Assessment/Plan:      * Recurrent epistaxis  83M with pAF, mechanical aortic valve (on warfarin), and recurrent epistaxis s/p multiple procedures. Left Merocel placed in ENT clinic on 12/15. Presented to ED for anemia in CKD setting. Anticipation of OR on .     -- Recommend admission to hospital medicine service  -- Sodium chloride nasal spray bilaterally Q4H while awake  -- Oxymetazoline nasal spray on the left PRN for epistaxis  -- Augmentin BID while nasal packing in place  -- Okay to resume warfarin while inpatient  -- To OR on  for nasal endoscopy and cauterization of epistaxis         -- Consented and witnessed by ED RN  -- Please Secure Chat me for any questions, page ENT on-call if unresponsive or urgent

## 2023-12-15 NOTE — ASSESSMENT & PLAN NOTE
- Currently in a fib stable rate  - Continue home regimen of metop for rate control  - Supra-therapeutic, holding warfarin  - Will continue to monitor on tele

## 2023-12-15 NOTE — ED NOTES
Patient identifiers for Dariel Urbina 83 y.o. male checked and correct.  Chief Complaint   Patient presents with    Epistaxis     +bloodthinner     Past Medical History:   Diagnosis Date    Anemia of chronic renal failure, stage 3 (moderate) 05/27/2015    Anticoagulant long-term use     Atherosclerosis of coronary artery bypass graft of native heart without angina pectoris 09/11/2012    3-27-18 [facility] Two vessel coronary artery disease.   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    Bilateral carotid artery disease 02/09/2017    Bleeding from the nose     Bleeding nose 03/21/2018    Cancer     Cataract     CHF (congestive heart failure)     CKD (chronic kidney disease) stage 3, GFR 30-59 ml/min 05/27/2015    Claudication of left lower extremity 09/17/2014    Colon polyp     Encounter for blood transfusion     Gastroesophageal reflux disease without esophagitis 03/19/2018    Gastrointestinal hemorrhage associated with intestinal diverticulosis 04/01/2018    Glaucoma     H/O mechanical aortic valve replacement 09/17/2014    History of gout 09/26/2012    Hyperparathyroidism due to renal insufficiency 07/27/2015    Internal hemorrhoid 04/03/2018    Long term current use of anticoagulant therapy 09/26/2012    Mechanical heart valve present     Metabolic acidosis with normal anion gap and bicarbonate losses 03/20/2018    Mixed hyperlipidemia 09/26/2012    NSTEMI (non-ST elevated myocardial infarction) 03/21/2018    Obesity, diabetes, and hypertension syndrome 02/23/2016    Paroxysmal atrial fibrillation 02/06/2023    PVD (peripheral vascular disease) 09/11/2012    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      Allergies reported:   Review of patient's allergies indicates:   Allergen Reactions    Lisinopril     Losartan      Intolerance- elevates potassium level         LOC: Patient is awake, alert, and aware of environment with an appropriate affect. Patient is oriented x 4 and speaking appropriately.  APPEARANCE: Patient resting comfortably and in no acute distress. Patient is clean and well groomed, patient's clothing is properly fastened.  HEENT: - JVD, + midline trach. L nare packed with muracel packing.  SKIN: The skin is warm and dry. Patient has normal skin turgor and moist mucus membranes.   MUSKULOSKELETAL: Patient is moving all extremities well, no obvious deformities noted. Pulses intact. PMS x 4  RESPIRATORY: Airway is open and patent. Respirations are spontaneous and non-labored with normal effort and rate. = CBBS  CARDIAC: Patient has a bradycardic rate and regular rhythm. 55 on cardiac monitor. No peripheral edema noted. + 2 bilateral pedal and radial pulses, < 3 s cap refill.  ABDOMEN: No distention noted. Soft and non-tender upon palpation.  NEUROLOGICAL: pupils 4 mm, PERRL. Facial expression is symmetrical. Hand grasps are equal bilaterally. Normal sensation in all extremities when touched with finger.

## 2023-12-15 NOTE — HPI
Mr. Urbina is an 83 year-old male with a history of pAF, mechanical aortic valve (on warfarin), and recurrent epistaxis s/p bilateral endoscopic sphenopalatine artery ligation on 6/14/23, left sphenopalatine artery embolization on 10/5/23, and nasal endoscopy and cauterization on 11/3/23. He presented to the ENT clinic today with left epistaxis and was packed with Merocel packing. He was then sent to the ED for admission in anticipation for OR cauterization on Monday.     On my arrival patient is hemostatic. No further bleeding. Last warfarin dose yesterday night.

## 2023-12-15 NOTE — ED PROVIDER NOTES
CC: Epistaxis (+bloodthinner)      History provided by:   Patient     HPI: Dariel Urbina is a 83 y.o. year old male past medical history of hyperlipidemia, gout, diabetes, CAD, GI hemorrhage with diverticulosis, hypertension, CKD, recurrent epistaxis on warfarin who presents to the ED for epistaxis, fatigue, shortness of breath   Patient with history of CAD, aortic mechanical valve on Coumadin with history of epistaxis, status post cautery of the left septum November 3, 2023, started having left nostril epistaxis for the last few days, he was seen in the ENT clinic today and had nasal packing placed and sent to the emergency department for admission and workup as the patient has been short of breath and lightheaded recently   Patient also reports worsening lower extremity edema b/l for the last week, compliant with his Bumex at home, +weight loss, no fever no URI symptoms, positive for cough dry, negative for chest  Patient reports history of mechanical valve on Coumadin last dose yesterday, goal for INR between 2 and 3    PHYSICAL EXAM:  Vitals:    12/15/23 0918   BP: (!) 141/61   Pulse: 67   Resp: 20   Temp: 97.4 °F (36.3 °C)     VS: triage VS reviewed    general:  Fatigued, comfortable, in no acute distress, pleasant  HENT: neck symmetric, trachea midline  Left nostril with packing in place, no posterior oropharynx bleeding, not spitting up blood, arrived with gauze covering the left nostril that was stained with blood but no active bleeding through the packing  Eyes: PERRL, no conjunctival injection and symmetrical eyelids  CV: RRR, no  murmurs, no rubs, no gallops, +2 PT LE edema  Resp: comfortable breathing, speaks in full sentences, CTAB  ABD:  soft, ND, no masses, + normal BS, NT  Renal: No CVAT  Neuro: AAO x 3, 5/5 strength x 4 extremities, sensation intact, face symmetric, speech normal  MSK: NC/AT, extremities w/out deformity   Skin: warm, dry      MDM:  Dariel Urbina is a 83 y.o. year old  male who presents to the ED for left nostril epistaxis, packed in the clinic by ENT, also with lightheadedness, shortness of breath, lower extremity edema concern for CHF exacerbation versus severe anemia        DDX includes but not limited to: asabove    Labs ordered and reviewed:   CBC with hemoglobin of 6.9   Platelets of 144  Troponin 0.05 (trop in December 2022 elevation) however unlikely this is baseline for the patient  CMP with creatinine of 2.8 increased from baseline of 1.8-2.3, BUN of 50    INR supratherapeutic at 5.2  Ptt wnl  BNP elevated at 793  Medication given in the ED: 1 u PRBC, lasix    CXR (ordered and reviewed): Minimally displaced right rib fractures age indeterminate though new compared April 2023.     This report was flagged in Epic as abnormal.       Old records obtained and reviewed:     Case discussed with the consultant: YONIS    Dariel is a 83 y.o. male who comes for follow-up of  epistaxis .  His last visit with me was on 11/10/2023.  He is well know to me.  He was previously undergone bilateral SP ligation followed by bilateral embolizations for his epistaxis.  He most recently underwent cautery on the left septum for persistent bleeding on 11/03/2023.  Initially did well for approximately 2 weeks but then had persistent left-sided bleeding which he was managing at home.  He is being seen in clinic today reporting extreme fatigue, decreased appetite, and frustration.  He was seen by his cardiologist he was kept him on Coumadin despite his recurrent episodes of epistaxis.  It was their understanding he was not able to get off of Coumadin due to his mechanical heart valve.   - Advised the patient to proceed to the ER for admission by the medical service for his CKD, likely anemia, and failure to thrive.  - he is currently hemostatic with a Merocel in his left naris.  - we will plan for repeat nasal endoscopy and cautery early next week.  - the ENT service will follow him while he is  "in-house.     Feb cardiac cath:"  Left Anterior Descending   Ost LAD lesion was 50% stenosed.   Mid LAD lesion was 100% stenosed.      Left Circumflex   Ost Cx to Mid Cx lesion was 100% stenosed.      Right Coronary Artery   Prox RCA lesion was 30% stenosed.   Mid RCA lesion was 30% stenosed.      LIMA Graft To Mid LAD      Intervention     No interventions have been documented.     KIRSTEN LVEF 60%  Hg 6.9, blood transfusion consent obtained after risk and benefits discussed w/ the patient  Lasix  PRBC 1 U transfusion    Supratheraputic INR: no active bleeding currently, + mechanical valve, no reversal agent, plan to hold coumadin    IMPRESSION:  1.) left nostril epistaxis  2.)  Severe anemia   3. CHF exacerbation   4. Supratherapeutic INR    Dispo: Obs HM    Aggregate Critical Care Time by Attending (exclusive of procedural time) = 30 minutes         During the Emergency Depment visit, there was a high probability of imminent or life threatening deterioration in the patient's condition necessitating medical decision  making of a high complexity. Organ systems that were compromised/potentially compromised                   Symptomatic  Anemia, CHF exacerbation, supratherapeutic INR  and/or there was potential for sepsis or metabolic failure.     Pt required constant monitoring because of the potential for them to deteriorate at any moment. Critical care was time spent personally by me on the following activities:  Development of treatment plan with patient or surrogate; discussion with consultant, evaluation of patient's response to treatment, examination of patient, obtaining history from patient or surrogate, ordering and performing treatments and interventions, ordering and reviewing of laboratory studies, ordering and review of radiographic studies, vitals, re-evaluation of patient' condition and review of old charts            The failure to initiate the interventions performed in the Emergency Department on an " urgent basis would have likely resulted in sudden, clinically significant or life threatening deterioration in the patient's condition.                                 Past Medical History:   Diagnosis Date    Anemia of chronic renal failure, stage 3 (moderate) 05/27/2015    Anticoagulant long-term use     Atherosclerosis of coronary artery bypass graft of native heart without angina pectoris 09/11/2012    3-27-18 [facility] Two vessel coronary artery disease.   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    Bilateral carotid artery disease 02/09/2017    Bleeding from the nose     Bleeding nose 03/21/2018    Cancer     Cataract     CHF (congestive heart failure)     CKD (chronic kidney disease) stage 3, GFR 30-59 ml/min 05/27/2015    Claudication of left lower extremity 09/17/2014    Colon polyp     Encounter for blood transfusion     Gastroesophageal reflux disease without esophagitis 03/19/2018    Gastrointestinal hemorrhage associated with intestinal diverticulosis 04/01/2018    Glaucoma     H/O mechanical aortic valve replacement 09/17/2014    History of gout 09/26/2012    Hyperparathyroidism due to renal insufficiency 07/27/2015    Internal hemorrhoid 04/03/2018    Long term current use of anticoagulant therapy 09/26/2012    Mechanical heart valve present     Metabolic acidosis with normal anion gap and bicarbonate losses 03/20/2018    Mixed hyperlipidemia 09/26/2012    NSTEMI (non-ST elevated myocardial infarction) 03/21/2018    Obesity, diabetes, and hypertension syndrome 02/23/2016    Paroxysmal atrial fibrillation 02/06/2023    PVD (peripheral vascular disease) 09/11/2012    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 02/01/2023    vertex scalp    Syncope 09/29/2022    Type 2 diabetes mellitus with diabetic peripheral angiopathy without gangrene 05/27/2015    Type 2 diabetes mellitus with stage 3 chronic kidney disease, without long-term current use of  insulin 10/02/2013     Past Surgical History:   Procedure Laterality Date    BACK SURGERY      CARDIAC CATHETERIZATION      CARDIAC VALVE REPLACEMENT      CARDIAC VALVE SURGERY      CARPAL TUNNEL RELEASE Right 05/19/2020    Procedure: RELEASE, CARPAL TUNNEL;  Surgeon: Rupesh Norris Jr., MD;  Location: [facility];  Service: Plastics;  Laterality: Right;    CATARACT EXTRACTION Left 11/13/2022        COLON SURGERY      COLONOSCOPY N/A 03/31/2017    Procedure: COLONOSCOPY;  Surgeon: Bruno Raymond MD;  [place] [facility] (4[facility] F[place]);  Service: Endoscopy;  Laterality: N/A;  Patient's wife requesting date.    COLONOSCOPY N/A 04/03/2018    Procedure: COLONOSCOPY;  Surgeon: Bonifacio Pelletier MD;  Location: [facility] (2ND FLR);  Service: Endoscopy;  Laterality: N/A;    COLONOSCOPY N/A 08/13/2018    Procedure: COLONOSCOPY;  Surgeon: Kam Barba MD;  Location: [facility] (2ND FLR);  Service: Endoscopy;  Laterality: N/A;  2nd floor: PA pressure 49; hx of moderate-severe valve disease     per Coumadin clinic-Patient can hold 5 days with lovenox bridge       ok to schedule per Katarina    CORONARY ANGIOGRAPHY N/A 10/04/2021    Procedure: Left heart cath +/- peripheral angiogram;  Surgeon: Jose Ruiz MD;  [place] [facility];  Service: Cardiology;  Laterality: N/A;    CORONARY ANGIOGRAPHY N/A 3/2/2023    Procedure: Angiogram, Coronary;  Surgeon: Devon Garnica MD;  Location: [facility];  Service: Cardiology;  Laterality: N/A;    CORONARY ANGIOPLASTY      CORONARY ARTERY BYPASS GRAFT      CORONARY BYPASS GRAFT ANGIOGRAPHY  10/04/2021    Procedure: Bypass graft study;  Surgeon: Jose Ruiz MD;  Location: [facility];  Service: Cardiology;;    CORONARY BYPASS GRAFT ANGIOGRAPHY  3/2/2023    Procedure: Bypass graft study;  Surgeon: Devon Garnica MD;  Location: [facility];  Service: Cardiology;;    ECHOCARDIOGRAM,TRANSESOPHAGEAL N/A 4/14/2023    Procedure: Transesophageal echo (KIRSTEN)  intra-procedure log documentation;  Surgeon: Thuan Diagnostic Provider;  Location: [facility] EP LAB;  Service: Cardiology;  Laterality: N/A;    ENDOSCOPIC NASAL CAUTERIZATION Bilateral 11/3/2023    Procedure: CAUTERIZATION, NOSE, ENDOSCOPIC;  Surgeon: Jono Russell MD;  Location: [facility] OR 2ND FLR;  Service: ENT;  Laterality: Bilateral;    EYE SURGERY      FUNCTIONAL ENDOSCOPIC SINUS SURGERY (FESS) Bilateral 6/14/2023    Procedure: FESS (FUNCTIONAL ENDOSCOPIC SINUS SURGERY);  Surgeon: Jono Russell MD;  Location: [facility] OR 2ND FLR;  Service: ENT;  Laterality: Bilateral;    HERNIA REPAIR      INTRAOCULAR PROSTHESES INSERTION Left 11/13/2022    Procedure: INSERTION, IOL PROSTHESIS;  Surgeon: Alia Mckeon MD;  Location: [facility] OR 1ST FLR;  Service: Ophthalmology;  Laterality: Left;    INTRAOCULAR PROSTHESES INSERTION Right 12/04/2022    Procedure: INSERTION, IOL PROSTHESIS;  Surgeon: Alia Mckeon MD;  Location: [facility] OR 1ST FLR;  Service: Ophthalmology;  Laterality: Right;    LIGATION, ARTERY, SPHENOPALATINE, ENDOSCOPIC Bilateral 6/14/2023    Procedure: LIGATION, ARTERY, SPHENOPALATINE, ENDOSCOPIC;  Surgeon: Jono Russell MD;  Location: [facility] OR 2ND FLR;  Service: ENT;  Laterality: Bilateral;    PHACOEMULSIFICATION OF CATARACT Left 11/13/2022    Procedure: PHACOEMULSIFICATION, CATARACT;  Surgeon: Alia Mckeon MD;  [facility];  Service: Ophthalmology;  Laterality: Left;    PHACOEMULSIFICATION OF CATARACT Right 12/04/2022    Procedure: PHACOEMULSIFICATION, CATARACT;  Surgeon: Alia Mckeon MD;  [facility];  Service: Ophthalmology;  Laterality: Right;    SPINE SURGERY      TRANSESOPHAGEAL ECHOCARDIOGRAPHY N/A 3/22/2023    Procedure: ECHOCARDIOGRAM, TRANSESOPHAGEAL;  Surgeon: [facility] Diagnostic Provider;  Location: [facility] EP LAB;  Service: Anesthesiology;  Laterality: N/A;    TRANSESOPHAGEAL ECHOCARDIOGRAPHY N/A 4/11/2023    Procedure: ECHOCARDIOGRAM, TRANSESOPHAGEAL;  Surgeon: Zenia  Diagnostic Provider;  Location: [facility] EP LAB;  Service: Anesthesiology;  Laterality: N/A;    VASECTOMY       Family History   Problem Relation Age of Onset    Heart failure Mother     Heart disease Mother     Heart failure Father     Heart disease Father     Alcohol abuse Father     Heart failure Brother     Heart disease Brother     Diabetes Brother     No Known Problems Sister     No Known Problems Maternal Grandmother     No Known Problems Maternal Grandfather     No Known Problems Paternal Grandmother     No Known Problems Paternal Grandfather     Heart disease Sister     No Known Problems Maternal Aunt     No Known Problems Maternal Uncle     No Known Problems Paternal Aunt     No Known Problems Paternal Uncle     Amblyopia Neg Hx     Blindness Neg Hx     Cancer Neg Hx     Cataracts Neg Hx     Glaucoma Neg Hx     Hypertension Neg Hx     Macular degeneration Neg Hx     Retinal detachment Neg Hx     Strabismus Neg Hx     Stroke Neg Hx     Thyroid disease Neg Hx     Anemia Neg Hx     Arrhythmia Neg Hx     Asthma Neg Hx     Clotting disorder Neg Hx     Fainting Neg Hx     Heart attack Neg Hx     Hyperlipidemia Neg Hx     Atrial Septal Defect Neg Hx     Melanoma Neg Hx      Current Facility-Administered Medications on File Prior to Encounter   Medication Dose Route Frequency Provider Last Rate Last Admin    ceFAZolin 2 g in dextrose 5 % in water (D5W) 50 mL IVPB (MB+)  2 g Intravenous On Call Procedure Yenifer Sandoval MD        haloperidol lactate injection 0.5 mg  0.5 mg Intravenous Q10 Min PRN Saeed Farrell MD        HYDROmorphone injection 0.2 mg  0.2 mg Intravenous Q5 Min PRN Saeed Farrell MD        sodium chloride 0.9% flush 10 mL  10 mL Intravenous PRN Saeed Farrell MD         Current Outpatient Medications on File Prior to Encounter   Medication Sig Dispense Refill    acetaminophen (TYLENOL) 500 MG tablet Take 500 mg by mouth daily as needed for Pain.      allopurinoL (ZYLOPRIM) 100 MG tablet Take 1  tablet (100 mg total) by mouth once daily. 90 tablet 3    amoxicillin-clavulanate 875-125mg (AUGMENTIN) 875-125 mg per tablet Take 1 tablet by mouth every 12 (twelve) hours. 5 tablet 0    bumetanide (BUMEX) 1 MG tablet Take 1 tablet (1 mg total) by mouth every other day. 45 tablet 3    diphenhydrAMINE (BENADRYL) 25 mg capsule Take 50 mg by mouth nightly as needed for Insomnia.      ferrous gluconate (FERGON) 324 MG tablet Take 1 tablet (324 mg total) by mouth daily with breakfast. 90 tablet 1    isosorbide mononitrate (IMDUR) 60 MG 24 hr tablet Take 1 tablet (60 mg total) by mouth every evening. 90 tablet 3    loperamide (IMODIUM) 2 mg capsule Take 2 mg by mouth 4 (four) times daily as needed for Diarrhea.      metoprolol succinate (TOPROL-XL) 25 MG 24 hr tablet Take 1 tablet (25 mg total) by mouth once daily. 90 tablet 3    omega-3 fatty acids/fish oil (FISH OIL-OMEGA-3 FATTY ACIDS) 300-1,000 mg capsule Take 1 capsule by mouth once daily.      oxymetazoline (AFRIN) 0.05 % nasal spray Use 2 sprays by Nasal route daily as needed (nose bleed). (Patient taking differently: 2 sprays by Nasal route every 2 (two) hours as needed (nose bleed).) 30 mL 0    rosuvastatin (CRESTOR) 40 MG Tab TAKE 1 TABLET EVERY EVENING. 90 tablet 3    traZODone (DESYREL) 50 MG tablet Take 1 tablet (50 mg total) by mouth nightly as needed for Insomnia. 30 tablet 11    warfarin (COUMADIN) 5 MG tablet Take warfarin 7.5mg (1.5 tablets) PO Tuesday & Saturday, then take 5mg PO all other days or as instructed by Coumadin Clinic 45 tablet 3     Lisinopril and Losartan  Social History     Socioeconomic History    Marital status:      Spouse name: Ameena    Number of children: 3   Occupational History    Occupation: retired   Tobacco Use    Smoking status: Former     Current packs/day: 0.00     Average packs/day: 1 pack/day for 20.0 years (20.0 ttl pk-yrs)     Types: Cigarettes     Start date: 9/11/1960     Quit date: 9/11/1980     Years since  quittin.2     Passive exposure: Never    Smokeless tobacco: Never   Substance and Sexual Activity    Alcohol use: No    Drug use: No    Sexual activity: Not Currently     Partners: Female     Social Determinants of Health     Financial Resource Strain: Low Risk  (10/9/2023)    Overall Financial Resource Strain (CARDIA)     Difficulty of Paying Living Expenses: Not hard at all   Food Insecurity: No Food Insecurity (10/9/2023)    Hunger Vital Sign     Worried About Running Out of Food in the Last Year: Never true     Ran Out of Food in the Last Year: Never true   Transportation Needs: No Transportation Needs (10/9/2023)    PRAPARE - Transportation     Lack of Transportation (Medical): No     Lack of Transportation (Non-Medical): No   Physical Activity: Inactive (10/9/2023)    Exercise Vital Sign     Days of Exercise per Week: 0 days     Minutes of Exercise per Session: 0 min   Stress: No Stress Concern Present (10/9/2023)    [employer] - Occupational Stress Questionnaire     Feeling of Stress : Not at all   Social Connections: Moderately Isolated (10/9/2023)    Social Connection and Isolation Panel [NHANES]     Frequency of Communication with Friends and Family: More than three times a week     Frequency of Social Gatherings with Friends and Family: More than three times a week     Attends [employer] Services: Never     Active Member of Clubs or Organizations: No     Attends Club or Organization Meetings: Never     Marital Status:    Housing Stability: Low Risk  (10/9/2023)    Housing Stability Vital Sign     Unable to Pay for Housing in the Last Year: No     Number of Places Lived in the Last Year: 1     Unstable Housing in the Last Year: No                 DATA & INTERVENTIONS:    LABS reviewed:  Labs Reviewed - No data to display    RADIOLOGY reviewed:  Imaging Results    None         MEDICATIONS/FLUIDS:  Medications - No data to display           Judie Oliva,  MD  12/15/23 6632

## 2023-12-15 NOTE — PROGRESS NOTES
Subjective:      Dariel is a 83 y.o. male who comes for follow-up of  epistaxis .  His last visit with me was on 11/10/2023.  He is well know to me.  He was previously undergone bilateral SP ligation followed by bilateral embolizations for his epistaxis.  He most recently underwent cautery on the left septum for persistent bleeding on 11/03/2023.  Initially did well for approximately 2 weeks but then had persistent left-sided bleeding which he was managing at home.  He is being seen in clinic today reporting extreme fatigue, decreased appetite, and frustration.  He was seen by his cardiologist he was kept him on Coumadin despite his recurrent episodes of epistaxis.  It was their understanding he was not able to get off of Coumadin due to his mechanical heart valve.    His current sinus regime consists of:  Nasal saline.    The patient's medications, allergies, past medical, surgical, social and family histories were reviewed and updated as appropriate.      A detailed review of systems was obtained with pertinent positives as per the above HPI, and otherwise negative.        Objective:     BP (!) 146/55 (BP Location: Left arm, Patient Position: Sitting, BP Method: Large (Automatic))   Pulse 60        Constitutional:   Vital signs are normal. He appears well-developed and well-nourished.     Head:  Normocephalic and atraumatic.     Ears:  Hearing normal to normal and whispered voice; external ear normal without scars, lesions, or masses; ear canal, tympanic membrane, and middle ear normal..   Right Ear: No swelling. Tympanic membrane is not perforated and not bulging. No middle ear effusion.   Left Ear: No swelling. Tympanic membrane is not perforated and not bulging.  No middle ear effusion.     Nose:  Nose normal including turbinates, nasal mucosa, sinuses and nasal septum. No epistaxis.     Mouth/Throat  Oropharynx clear and moist without lesions or asymmetry and normal uvula midline. Normal dentition. No  tonsillar abscesses. Tonsillar exudate.      Neck:  Neck normal without thyromegaly masses, asymmetry, normal tracheal structure, crepitus, and tenderness, thyroid normal, trachea normal, phonation normal, full range of motion with neck supple and no adenopathy. No stridor present.        Head (right side): No submental adenopathy present.        Head (left side): No submental adenopathy present.     He has no cervical adenopathy.     Pulmonary/Chest:   No stridor.       Procedure    Nasal Endoscopy with Control of Epistaxis    Nasal Endoscopy:  12/15/2023    The use of diagnostic nasal endoscopy was considered medically necessary for the evaluation and visualization of the nasal anatomy for symptoms suggestive of nasal or sinus origin. Physical examination (including a nasal speculum evaluation) did not provide sufficient clinical information to establish a diagnosis, or symptoms did not improve or worsened following treatment.     The nasal cavity was decongested with topical 1% phenylephrine and anesthetized with 4% lidocaine.  A rigid 0-degree endoscope was introduced into the nasal cavity.    The patient was seated in the examination chair. After discussion of risks and benefits, a nasal endoscope was inserted into the nose the endoscope was passed along the left nasal floor to the nasopharynx. It was then passed between the middle and superior meatus, nasal turbinates, nasal septum, nasopharynx and sphenoethmoid region. The nasal endoscope was withdrawn and there was no complications. An identical procedure was performed on the right side. I was present for the entire procedure.The patient tolerated the above procedure well. The findings of this procedure can be found in the dictated note from 12/15/2023 visit.  ]      After written consent was obtained the nose was anesthetized with topical pontocaine and phenylephrine pledgets.    Data Reviewed    WBC (K/uL)   Date Value   11/03/2023 5.17     Eosinophil %  "(%)   Date Value   11/03/2023 1.4     Eos # (K/uL)   Date Value   11/03/2023 0.1     Platelets (K/uL)   Date Value   11/03/2023 177     Glucose (mg/dL)   Date Value   11/07/2023 98     No results found for: "IGE"    No sinus imaging available.      Assessment:     1. Recurrent epistaxis    2. Anticoagulant long-term use    3. Stage 3b chronic kidney disease    4. Anemia, unspecified type       Plan:     - Advised the patient to proceed to the ER for admission by the medical service for his CKD, likely anemia, and failure to thrive.  - he is currently hemostatic with a Merocel in his left naris.  - we will plan for repeat nasal endoscopy and cautery early next week.  - the ENT service will follow him while he is in-house.    Jono Russell MD     "

## 2023-12-15 NOTE — ASSESSMENT & PLAN NOTE
Chronic history, now appears to be recovered as most recent TTE 4/2023 with EF 60%, moderate MR. Saturating well on RA. Lung sounds clear. Notable for 1+ BL ALEXANDRA which is new for him. 's on admission  - S/p IV 40mg Lasix in ED  - Strict I/O's, daily volume assessments  - Continue home bumex, Imdur, metoprolol

## 2023-12-15 NOTE — ANESTHESIA PREPROCEDURE EVALUATION
[facility]  Anesthesia Pre-Operative Evaluation         Patient Name: Dariel Urbina  YOB: 1940  MRN: [id number]    SUBJECTIVE:     Pre-operative evaluation for Procedure(s) (LRB):  CAUTERIZATION, NOSE, ENDOSCOPIC (N/A)     12/15/2023    Dariel Urbina is a 83 y.o. male w/ a significant PMHx of pAF, mechanical aortic valve (on warfarin), and recurrent epistaxis s/p bilateral endoscopic sphenopalatine artery ligation on 6/14/23, left sphenopalatine artery embolization on 10/5/23, and nasal endoscopy and cauterization on 11/3/23. Sent to ED from ENT clinic for persistent epistaxis.     Patient now presents for the above procedure(s).      LDA:       Peripheral IV - Single Lumen 12/15/23 0955 20 G Left Antecubital (Active)   Site Assessment Clean;Dry;Intact 12/15/23 1005   Extremity Assessment Distal to IV No abnormal discoloration;No redness;No swelling;No warmth 12/15/23 1005   Line Status Blood return noted 12/15/23 1005   Dressing Status Clean;Dry;Intact 12/15/23 1005   Number of days: 0       Prev airway: Intubation:     Induction:  Rapid sequence induction    Intubated:  Postinduction    Mask Ventilation:  Not attempted    Attempts:  1    Attempted By:  CRNA    Method of Intubation:  Video laryngoscopy    Blade:  Osorio 3    Laryngeal View Grade: Grade I - full view of cords      Difficult Airway Encountered?: No      Complications:  None    Airway Device:  Oral endotracheal tube    Airway Device Size:  7.5    Style/Cuff Inflation:  Cuffed    Inflation Amount (mL):  7    Tube secured:  22    Secured at:  The lips    Placement Verified By:  Capnometry    Complicating Factors:  None    Findings Post-Intubation:  BS equal bilateral and atraumatic/condition of teeth unchanged    Drips: None documented.      Patient Active Problem List   Diagnosis    Hyperlipidemia associated with type 2 diabetes mellitus    History of colon resection    Renovascular hypertension    History of  gout    Atherosclerosis of native artery of extremity with intermittent claudication    Type 2 diabetes mellitus with diabetic peripheral angiopathy without gangrene    Bilateral carotid artery disease    Pulmonary heart disease    Metabolic acidosis with normal anion gap and bicarbonate losses    Gastrointestinal hemorrhage associated with intestinal diverticulosis    Hx of colonic polyp    Hypertension associated with diabetes    Bilateral carpal tunnel syndrome    Numbness and tingling in both hands    Severe carpal tunnel syndrome of right wrist    Atherosclerosis of native coronary artery of native heart without angina pectoris    Anemia    Chronic kidney disease (CKD), stage IV (severe)    Syncope    Recurrent epistaxis    Supratherapeutic INR    Coronary artery disease involving native coronary artery of native heart without angina pectoris    Chronic combined systolic and diastolic heart failure    Paroxysmal atrial fibrillation    Mitral insufficiency    Purpura    Aortic calcification    Dysphagia       Review of patient's allergies indicates:   Allergen Reactions    Lisinopril     Losartan      Intolerance- elevates potassium level       Current Inpatient Medications:      Current Facility-Administered Medications on File Prior to Encounter   Medication Dose Route Frequency Provider Last Rate Last Admin    ceFAZolin 2 g in dextrose 5 % in water (D5W) 50 mL IVPB (MB+)  2 g Intravenous On Call Procedure Yenifer Sandoval MD        haloperidol lactate injection 0.5 mg  0.5 mg Intravenous Q10 Min PRN Saeed Farrell MD        HYDROmorphone injection 0.2 mg  0.2 mg Intravenous Q5 Min PRN Saeed Farrell MD        sodium chloride 0.9% flush 10 mL  10 mL Intravenous PRN Saeed Farrell MD         Current Outpatient Medications on File Prior to Encounter   Medication Sig Dispense Refill    acetaminophen (TYLENOL) 500 MG tablet Take 500 mg by mouth daily as needed for Pain.      allopurinoL (ZYLOPRIM) 100 MG  tablet Take 1 tablet (100 mg total) by mouth once daily. 90 tablet 3    bumetanide (BUMEX) 1 MG tablet Take 1 tablet (1 mg total) by mouth every other day. 45 tablet 3    ferrous gluconate (FERGON) 324 MG tablet Take 1 tablet (324 mg total) by mouth daily with breakfast. 90 tablet 1    isosorbide mononitrate (IMDUR) 60 MG 24 hr tablet Take 1 tablet (60 mg total) by mouth every evening. 90 tablet 3    loperamide (IMODIUM) 2 mg capsule Take 2 mg by mouth 4 (four) times daily as needed for Diarrhea.      metoprolol succinate (TOPROL-XL) 25 MG 24 hr tablet Take 1 tablet (25 mg total) by mouth once daily. 90 tablet 3    omega-3 fatty acids/fish oil (FISH OIL-OMEGA-3 FATTY ACIDS) 300-1,000 mg capsule Take 1 capsule by mouth once daily.      oxymetazoline (AFRIN) 0.05 % nasal spray Use 2 sprays by Nasal route daily as needed (nose bleed). (Patient taking differently: 2 sprays by Nasal route every 2 (two) hours as needed (nose bleed).) 30 mL 0    rosuvastatin (CRESTOR) 40 MG Tab TAKE 1 TABLET EVERY EVENING. 90 tablet 3    traZODone (DESYREL) 50 MG tablet Take 1 tablet (50 mg total) by mouth nightly as needed for Insomnia. 30 tablet 11    warfarin (COUMADIN) 5 MG tablet Take warfarin 7.5mg (1.5 tablets) PO Tuesday & Saturday, then take 5mg PO all other days or as instructed by Coumadin Clinic 45 tablet 3       Past Surgical History:   Procedure Laterality Date    BACK SURGERY      CARDIAC CATHETERIZATION      CARDIAC VALVE REPLACEMENT      CARDIAC VALVE SURGERY      CARPAL TUNNEL RELEASE Right 05/19/2020    Procedure: RELEASE, CARPAL TUNNEL;  Surgeon: Rupesh Norris Jr., MD;  Location: [facility];  Service: Plastics;  Laterality: Right;    CATARACT EXTRACTION Left 11/13/2022        COLON SURGERY      COLONOSCOPY N/A 03/31/2017    Procedure: COLONOSCOPY;  Surgeon: Bruno Raymond MD;  Location: [address]);  Service: Endoscopy;  Laterality: N/A;  Patient's wife requesting date.    COLONOSCOPY N/A  04/03/2018    Procedure: COLONOSCOPY;  Surgeon: Bonifacio Pelletier MD;  Location: [facility] ENDO (2ND FLR);  Service: Endoscopy;  Laterality: N/A;    COLONOSCOPY N/A 08/13/2018    Procedure: COLONOSCOPY;  Surgeon: Kam Barba MD;  Location: [facility] ENDO (2ND FLR);  Service: Endoscopy;  Laterality: N/A;  2nd floor: PA pressure 49; hx of moderate-severe valve disease     per Coumadin clinic-Patient can hold 5 days with lovenox bridge       ok to schedule per Katarina    CORONARY ANGIOGRAPHY N/A 10/04/2021    Procedure: Left heart cath +/- peripheral angiogram;  Surgeon: Jose Ruiz MD;  Location: [facility] CATH LAB;  Service: Cardiology;  Laterality: N/A;    CORONARY ANGIOGRAPHY N/A 3/2/2023    Procedure: Angiogram, Coronary;  Surgeon: Devon Garnica MD;  Location: [facility] CATH LAB;  Service: Cardiology;  Laterality: N/A;    CORONARY ANGIOPLASTY      CORONARY ARTERY BYPASS GRAFT      CORONARY BYPASS GRAFT ANGIOGRAPHY  10/04/2021    Procedure: Bypass graft study;  Surgeon: Jose Ruiz MD;  Location: [facility] CATH LAB;  Service: Cardiology;;    CORONARY BYPASS GRAFT ANGIOGRAPHY  3/2/2023    Procedure: Bypass graft study;  Surgeon: Devon Garnica MD;  Location: [facility] CATH LAB;  Service: Cardiology;;    ECHOCARDIOGRAM,TRANSESOPHAGEAL N/A 4/14/2023    Procedure: Transesophageal echo (KIRSTEN) intra-procedure log documentation;  Surgeon: [facility] Diagnostic Provider;  Location: [facility] EP LAB;  Service: Cardiology;  Laterality: N/A;    ENDOSCOPIC NASAL CAUTERIZATION Bilateral 11/3/2023    Procedure: CAUTERIZATION, NOSE, ENDOSCOPIC;  Surgeon: Jono Russell MD;  Location: [address];  Service: ENT;  Laterality: Bilateral;    EYE SURGERY      FUNCTIONAL ENDOSCOPIC SINUS SURGERY (FESS) Bilateral 6/14/2023    Procedure: FESS (FUNCTIONAL ENDOSCOPIC SINUS SURGERY);  Surgeon: Jono Russell MD;  Location: [address];  Service: ENT;  Laterality: Bilateral;    HERNIA REPAIR      INTRAOCULAR PROSTHESES INSERTION Left 11/13/2022     Procedure: INSERTION, IOL PROSTHESIS;  Surgeon: Alia Mckeon MD;  Location: [facility] OR 1ST FLR;  Service: Ophthalmology;  Laterality: Left;    INTRAOCULAR PROSTHESES INSERTION Right 2022    Procedure: INSERTION, IOL PROSTHESIS;  Surgeon: Alia Mckeon MD;  Location: [facility] OR 1ST FLR;  Service: Ophthalmology;  Laterality: Right;    LIGATION, ARTERY, SPHENOPALATINE, ENDOSCOPIC Bilateral 2023    Procedure: LIGATION, ARTERY, SPHENOPALATINE, ENDOSCOPIC;  Surgeon: Jono Russell MD;  Location: [facility] OR 2ND FLR;  Service: ENT;  Laterality: Bilateral;    PHACOEMULSIFICATION OF CATARACT Left 2022    Procedure: PHACOEMULSIFICATION, CATARACT;  Surgeon: Alia Mckeon MD;  Location: [facility] OR 1ST FLR;  Service: Ophthalmology;  Laterality: Left;    PHACOEMULSIFICATION OF CATARACT Right 2022    Procedure: PHACOEMULSIFICATION, CATARACT;  Surgeon: Alia Mckeon MD;  [facility];  Service: Ophthalmology;  Laterality: Right;    SPINE SURGERY      TRANSESOPHAGEAL ECHOCARDIOGRAPHY N/A 3/22/2023    Procedure: ECHOCARDIOGRAM, TRANSESOPHAGEAL;  Surgeon: [facility] Diagnostic Provider;  Location: [facility] EP LAB;  Service: Anesthesiology;  Laterality: N/A;    TRANSESOPHAGEAL ECHOCARDIOGRAPHY N/A 2023    Procedure: ECHOCARDIOGRAM, TRANSESOPHAGEAL;  Surgeon: [facility] Diagnostic Provider;  Location: [facility] EP LAB;  Service: Anesthesiology;  Laterality: N/A;    VASECTOMY         Social History     Socioeconomic History    Marital status:      Spouse name: Ameena    Number of children: 3   Occupational History    Occupation: retired   Tobacco Use    Smoking status: Former     Current packs/day: 0.00     Average packs/day: 1 pack/day for 20.0 years (20.0 ttl pk-yrs)     Types: Cigarettes     Start date: 1960     Quit date: 1980     Years since quittin.2     Passive exposure: Never    Smokeless tobacco: Never   Substance and Sexual Activity    Alcohol use: No    Drug use: No    Sexual  activity: Not Currently     Partners: Female     Social Determinants of Health     Financial Resource Strain: Low Risk  (10/9/2023)    Overall Financial Resource Strain (CARDIA)     Difficulty of Paying Living Expenses: Not hard at all   Food Insecurity: No Food Insecurity (10/9/2023)    Hunger Vital Sign     Worried About Running Out of Food in the Last Year: Never true     Ran Out of Food in the Last Year: Never true   Transportation Needs: No Transportation Needs (10/9/2023)    PRAPARE - Transportation     Lack of Transportation (Medical): No     Lack of Transportation (Non-Medical): No   Physical Activity: Inactive (10/9/2023)    Exercise Vital Sign     Days of Exercise per Week: 0 days     Minutes of Exercise per Session: 0 min   Stress: No Stress Concern Present (10/9/2023)    [employer] - Occupational Stress Questionnaire     Feeling of Stress : Not at all   Social Connections: Moderately Isolated (10/9/2023)    Social Connection and Isolation Panel [NHANES]     Frequency of Communication with Friends and Family: More than three times a week     Frequency of Social Gatherings with Friends and Family: More than three times a week     Attends Jewish Services: Never     Active Member of Clubs or Organizations: No     Attends Club or Organization Meetings: Never     Marital Status:    Housing Stability: Low Risk  (10/9/2023)    Housing Stability Vital Sign     Unable to Pay for Housing in the Last Year: No     Number of Places Lived in the Last Year: 1     Unstable Housing in the Last Year: No       OBJECTIVE:     Vital Signs Range (Last 24H):  Temp:  [36.3 °C (97.4 °F)-36.6 °C (97.9 °F)]   Pulse:  [55-67]   Resp:  [15-20]   BP: (141-149)/(55-66)   SpO2:  [94 %-98 %]       Significant Labs:  Lab Results   Component Value Date    WBC 5.47 12/15/2023    HGB 6.9 (L) 12/15/2023    HCT 22.0 (L) 12/15/2023     (L) 12/15/2023    CHOL 115 (L) 02/28/2023    TRIG 155 (H)  02/28/2023    HDL 37 (L) 02/28/2023    ALT 25 11/07/2023    AST 36 11/07/2023     11/07/2023    K 4.8 11/07/2023     11/07/2023    CREATININE 2.3 (H) 11/07/2023    BUN 47 (H) 11/07/2023    CO2 21 (L) 11/07/2023    TSH 3.705 09/29/2022    PSA 0.35 07/27/2012    INR 2.3 (H) 12/05/2023    HGBA1C 6.1 (H) 11/07/2023       Diagnostic Studies: No relevant studies.    EKG:   Results for orders placed or performed in visit on 05/25/23   SCHEDULED EKG 12-LEAD (to Muse)    Collection Time: 05/25/23  7:24 AM    Narrative    Test Reason : E11.59,I15.2,Z01.818,    Vent. Rate : 070 BPM     Atrial Rate : 039 BPM     P-R Int : 000 ms          QRS Dur : 150 ms      QT Int : 470 ms       P-R-T Axes : 000 011 183 degrees     QTc Int : 507 ms    Atrial fibrillation with a competing junctional pacemaker with premature  ventricular or aberrantly conducted complexes  Left bundle branch block  Abnormal ECG  When compared with ECG of 11-APR-2023 08:51,  No significant change was found  Confirmed by Jesica Ko MD ([id number]) on 5/30/2023 9:55:21 AM    Referred By: MANGO LUX           Confirmed By:Jesica Ko MD       2D ECHO:  TTE:  Results for orders placed or performed during the hospital encounter of 12/29/22   Echo   Result Value Ref Range    BSA 1.9 m2    TDI SEPTAL 0.06 m/s    LV LATERAL E/E' RATIO 17.88 m/s    LV SEPTAL E/E' RATIO 23.83 m/s    LA WIDTH 5.80 cm    TDI LATERAL 0.08 m/s    LVIDd 5.21 3.5 - 6.0 cm    IVS 1.15 (A) 0.6 - 1.1 cm    Posterior Wall 1.12 (A) 0.6 - 1.1 cm    LVIDs 4.09 (A) 2.1 - 4.0 cm    FS 21 28 - 44 %    LA volume 194.02 cm3    Sinus 3.15 cm    STJ 2.49 cm    Ascending aorta 2.61 cm    LV mass 231.14 g    LA size 5.44 cm    RVDD 3.95 cm    TAPSE 1.73 cm    Left Ventricle Relative Wall Thickness 0.43 cm    AV mean gradient 17 mmHg    AV valve area 1.25 cm2    AV Velocity Ratio 0.36     AV index (prosthetic) 0.38     MV mean gradient 4 mmHg    MV valve area p 1/2 method 4.64 cm2     MV valve area by continuity eq 2.41 cm2    E/A ratio 4.33     Mean e' 0.07 m/s    E wave deceleration time 163.38 msec    LVOT diameter 2.06 cm    LVOT area 3.3 cm2    LVOT peak ashkan 1.09 m/s    LVOT peak VTI 21.00 cm    Ao peak ashkan 3.06 m/s    Ao VTI 55.81 cm    Mr max ashkan 0.06 m/s    LVOT stroke volume 69.96 cm3    AV peak gradient 37 mmHg    MV peak gradient 11 mmHg    E/E' ratio 20.43 m/s    MV Peak E Ashkan 1.43 m/s    TR Max Ashkan 3.85 m/s    MV VTI 29.04 cm    MV stenosis pressure 1/2 time 47.38 ms    MV Peak A Ashkan 0.33 m/s    LV Systolic Volume 73.61 mL    LV Systolic Volume Index 39.6 mL/m2    LV Diastolic Volume 130.05 mL    LV Diastolic Volume Index 69.92 mL/m2    LA Volume Index 104.3 mL/m2    LV Mass Index 124 g/m2    RA Major Axis 5.43 cm    Left Atrium Minor Axis 7.30 cm    Left Atrium Major Axis 7.17 cm    Triscuspid Valve Regurgitation Peak Gradient 59 mmHg    LA Volume Index (Mod) 93.6 mL/m2    LA volume (mod) 174.14 cm3    RA Width 3.59 cm    Right Atrial Pressure (from IVC) 3 mmHg    EF 48 %    TV resting pulmonary artery pressure 62 mmHg    Narrative    · The left ventricle is normal in size with mild concentric hypertrophy   and mildly decreased systolic function.  · The estimated ejection fraction is 48%.  · There are segmental left ventricular wall motion abnormalities.  · There is abnormal septal wall motion.  · Grade III left ventricular diastolic dysfunction.  · Severe left atrial enlargement.  · Normal right ventricular size with normal right ventricular systolic   function.  · Mild right atrial enlargement.  · There is a mechanical aortic valve present.  · The aortic valve mean gradient is 17 mmHg with a dimensionless index of   0.38.  · Moderate to moderate -severe ( 2-3+) MR  · Moderate tricuspid regurgitation.  · Normal central venous pressure (3 mmHg).  · The estimated PA systolic pressure is 62 mmHg.  · There is at least moderate pulmonary hypertension.          KIRSTEN:  Results for orders  placed or performed during the hospital encounter of 04/11/23   Transesophageal echo (KIRSTEN)   Result Value Ref Range    BSA 1.84 m2    EF 60 %    Narrative    · The left ventricle is normal in size with normal systolic function.The   estimated ejection fraction is 60%.  · Normal right ventricular size with normal right ventricular systolic   function.  · Moderate mitral regurgitation.  · There is a mechanical aortic valve present. There is no aortic   insufficiency present.  · Plaque present in the transverse aorta.          ASSESSMENT/PLAN:         Pre-op Assessment    I have reviewed the Patient Summary Reports.     I have reviewed the Nursing Notes. I have reviewed the NPO Status.   I have reviewed the Medications.     Review of Systems  Anesthesia Hx:  No problems with previous Anesthesia   History of prior surgery of interest to airway management or planning:          Denies Family Hx of Anesthesia complications.    Denies Personal Hx of Anesthesia complications.                    Hematology/Oncology:    Oncology Normal    -- Anemia:                                  EENT/Dental:  EENT/Dental Normal           Cardiovascular:     Hypertension Valvular problems/Murmurs (s/p replacement on warfarin) Past MI CAD   CABG/stent               Coronary Artery Disease:          Hx of Myocardial Infarction     Congestive Heart Failure (CHF)                Hypertension     Atrial Fibrillation     Pulmonary:  Pulmonary Normal                       Renal/:  Chronic Renal Disease, CKD        Kidney Function/Disease             Hepatic/GI:     GERD      Gerd          Neurological:    Neuromuscular Disease,                                 Neuromuscular Disease   Endocrine:  Diabetes    Diabetes                      Psych:  Psychiatric Normal                    Physical Exam  General: Well nourished, Cooperative, Alert and Oriented  Continuously having to cough up clot from back of throat  Airway:  Mallampati: II / II/ I  Mouth  Opening: Normal  TM Distance: Normal  Tongue: Normal  Neck ROM: Normal ROM    Dental:  Periodontal disease  No teeth on top. Has 4 teeth with removable plate in center. Few remaining have disease/chipped      Anesthesia Plan  Type of Anesthesia, risks & benefits discussed:    Anesthesia Type: Gen ETT  Intra-op Monitoring Plan: Standard ASA Monitors  Post Op Pain Control Plan: multimodal analgesia and IV/PO Opioids PRN  Induction:  IV  Airway Plan: Direct, Post-Induction  Informed Consent: Informed consent signed with the Patient and all parties understand the risks and agree with anesthesia plan.  All questions answered.   ASA Score: 4  Day of Surgery Review of History & Physical: H&P Update referred to the surgeon/provider.    Ready For Surgery From Anesthesia Perspective.     .

## 2023-12-15 NOTE — CONSULTS
Hospital Medicine Pharmacy Consult Note    Pharmacy to review dose of warfarin  Dariel Urbina is a 83 y.o. male on warfarin therapy for  AF and mechanical aortic valve . PharmD has been consulted for warfarin dosing.    Current order: hold  Home dose: warfarin 5 mg daily  Coumadin clinic enrollment: Active  INR goal: 2-3    Lab Results   Component Value Date    INR 5.2 (HH) 12/15/2023    INR 2.3 (H) 12/05/2023    INR 1.5 (H) 11/27/2023    PROTIME 26.6 (H) 10/29/2023    PROTIME 28.4 (H) 09/30/2023     Significant drug interactions: none  Diet:  Regular, low potassium, low cholesterol/saturated fat  without supplements     Recommendation(s):   INR supratherapeutic, hold warfarin tonight  Daily INR  Pharmacy will continue to follow and monitor warfarin      Thank you for the consult,  Manny Tapia  [facility] [phone]    **Note: Consults are reviewed Monday-Friday 7:00am-3:30pm. The above recommendations are only suggested. The recommendations should be considered in conjunction with all patient factors.**

## 2023-12-15 NOTE — ASSESSMENT & PLAN NOTE
Patient's anemia is currently controlled. Has received 1 units of PRBCs on 1 . Etiology likely d/t acute blood loss which was from epistaxis  Current CBC reviewed-   Lab Results   Component Value Date    HGB 6.9 (L) 12/15/2023    HCT 22.0 (L) 12/15/2023   - Transfuse to goal Hgb > 7  - Monitor serial CBC and transfuse if patient becomes hemodynamically unstable, symptomatic or H/H drops below 7/21.  - Daily INR's,  - Holding warfarin; goal INR 2-3

## 2023-12-15 NOTE — ASSESSMENT & PLAN NOTE
- Working to optimize BP control  - Continue home regimen of Imdur, metop  - Will continue to monitor and further titrate antihypertensives as clinically indicated

## 2023-12-15 NOTE — SUBJECTIVE & OBJECTIVE
Past Medical History:   Diagnosis Date    Anemia of chronic renal failure, stage 3 (moderate) 05/27/2015    Anticoagulant long-term use     Atherosclerosis of coronary artery bypass graft of native heart without angina pectoris 09/11/2012    3-27-18 [facility] Two vessel coronary artery disease.   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    Bilateral carotid artery disease 02/09/2017    Bleeding from the nose     Bleeding nose 03/21/2018    Cancer     Cataract     CHF (congestive heart failure)     CKD (chronic kidney disease) stage 3, GFR 30-59 ml/min 05/27/2015    Claudication of left lower extremity 09/17/2014    Colon polyp     Encounter for blood transfusion     Gastroesophageal reflux disease without esophagitis 03/19/2018    Gastrointestinal hemorrhage associated with intestinal diverticulosis 04/01/2018    Glaucoma     H/O mechanical aortic valve replacement 09/17/2014    History of gout 09/26/2012    Hyperparathyroidism due to renal insufficiency 07/27/2015    Internal hemorrhoid 04/03/2018    Long term current use of anticoagulant therapy 09/26/2012    Mechanical heart valve present     Metabolic acidosis with normal anion gap and bicarbonate losses 03/20/2018    Mixed hyperlipidemia 09/26/2012    NSTEMI (non-ST elevated myocardial infarction) 03/21/2018    Obesity, diabetes, and hypertension syndrome 02/23/2016    Paroxysmal atrial fibrillation 02/06/2023    PVD (peripheral vascular disease) 09/11/2012    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       Past Surgical History:   Procedure Laterality Date    BACK SURGERY      CARDIAC CATHETERIZATION      CARDIAC VALVE REPLACEMENT      CARDIAC VALVE  SURGERY      CARPAL TUNNEL RELEASE Right 05/19/2020    Procedure: RELEASE, CARPAL TUNNEL;  Surgeon: Rupesh Norris Jr., MD;  Location: [facility];  Service: Plastics;  Laterality: Right;    CATARACT EXTRACTION Left 11/13/2022        COLON SURGERY      COLONOSCOPY N/A 03/31/2017    Procedure: COLONOSCOPY;  Surgeon: Bruno Raymond MD;  Location: [facility] ENDO (4TH FLR);  Service: Endoscopy;  Laterality: N/A;  Patient's wife requesting date.    COLONOSCOPY N/A 04/03/2018    Procedure: COLONOSCOPY;  Surgeon: Bonifacio Pelletier MD;  Location: [facility] ENDO (2ND FLR);  Service: Endoscopy;  Laterality: N/A;    COLONOSCOPY N/A 08/13/2018    Procedure: COLONOSCOPY;  Surgeon: Kam Barba MD;  Location: [facility] ENDO (2ND FLR);  Service: Endoscopy;  Laterality: N/A;  2nd floor: PA pressure 49; hx of moderate-severe valve disease     per Coumadin clinic-Patient can hold 5 days with lovenox bridge       ok to schedule per Katarina    CORONARY ANGIOGRAPHY N/A 10/04/2021    Procedure: Left heart cath +/- peripheral angiogram;  Surgeon: Jose Ruiz MD;  Location: [facility] CATH LAB;  Service: Cardiology;  Laterality: N/A;    CORONARY ANGIOGRAPHY N/A 3/2/2023    Procedure: Angiogram, Coronary;  Surgeon: Devon Garnica MD;  Location: [facility] CATH LAB;  Service: Cardiology;  Laterality: N/A;    CORONARY ANGIOPLASTY      CORONARY ARTERY BYPASS GRAFT      CORONARY BYPASS GRAFT ANGIOGRAPHY  10/04/2021    Procedure: Bypass graft study;  Surgeon: Jose Ruiz MD;  Location: [facility] CATH LAB;  Service: Cardiology;;    CORONARY BYPASS GRAFT ANGIOGRAPHY  3/2/2023    Procedure: Bypass graft study;  Surgeon: Devon Garnica MD;  Location: [facility] CATH LAB;  Service: Cardiology;;    ECHOCARDIOGRAM,TRANSESOPHAGEAL N/A 4/14/2023    Procedure: Transesophageal echo (KIRSTEN) intra-procedure log documentation;  Surgeon: Thuan Diagnostic Provider;  Location: [facility] EP LAB;  Service: Cardiology;  Laterality: N/A;    ENDOSCOPIC NASAL CAUTERIZATION Bilateral  11/3/2023    Procedure: CAUTERIZATION, NOSE, ENDOSCOPIC;  Surgeon: Jono Russell MD;  Location: [facility] OR 2ND FLR;  Service: ENT;  Laterality: Bilateral;    EYE SURGERY      FUNCTIONAL ENDOSCOPIC SINUS SURGERY (FESS) Bilateral 6/14/2023    Procedure: FESS (FUNCTIONAL ENDOSCOPIC SINUS SURGERY);  Surgeon: Jono Russell MD;  Location: [facility] OR 2ND FLR;  Service: ENT;  Laterality: Bilateral;    HERNIA REPAIR      INTRAOCULAR PROSTHESES INSERTION Left 11/13/2022    Procedure: INSERTION, IOL PROSTHESIS;  Surgeon: Alia Mckeon MD;  Location: [facility] OR 1ST FLR;  Service: Ophthalmology;  Laterality: Left;    INTRAOCULAR PROSTHESES INSERTION Right 12/04/2022    Procedure: INSERTION, IOL PROSTHESIS;  Surgeon: Alia Mckeon MD;  Location: [facility] OR 1ST FLR;  Service: Ophthalmology;  Laterality: Right;    LIGATION, ARTERY, SPHENOPALATINE, ENDOSCOPIC Bilateral 6/14/2023    Procedure: LIGATION, ARTERY, SPHENOPALATINE, ENDOSCOPIC;  Surgeon: Jono Russell MD;  Location: [facility] OR 2ND FLR;  Service: ENT;  Laterality: Bilateral;    PHACOEMULSIFICATION OF CATARACT Left 11/13/2022    Procedure: PHACOEMULSIFICATION, CATARACT;  Surgeon: Alia Mckeon MD;  Location: [facility] OR 1ST FLR;  Service: Ophthalmology;  Laterality: Left;    PHACOEMULSIFICATION OF CATARACT Right 12/04/2022    Procedure: PHACOEMULSIFICATION, CATARACT;  Surgeon: Alia Mckeon MD;  [facility];  Service: Ophthalmology;  Laterality: Right;    SPINE SURGERY      TRANSESOPHAGEAL ECHOCARDIOGRAPHY N/A 3/22/2023    Procedure: ECHOCARDIOGRAM, TRANSESOPHAGEAL;  Surgeon: [facility] Diagnostic Provider;  Location: [facility] EP LAB;  Service: Anesthesiology;  Laterality: N/A;    TRANSESOPHAGEAL ECHOCARDIOGRAPHY N/A 4/11/2023    Procedure: ECHOCARDIOGRAM, TRANSESOPHAGEAL;  Surgeon: [facility] Diagnostic Provider;  Location: [facility] EP LAB;  Service: Anesthesiology;  Laterality: N/A;    VASECTOMY         Review of patient's allergies indicates:   Allergen Reactions     Lisinopril     Losartan      Intolerance- elevates potassium level       Current Facility-Administered Medications on File Prior to Encounter   Medication    ceFAZolin 2 g in dextrose 5 % in water (D5W) 50 mL IVPB (MB+)    HYDROmorphone injection 0.2 mg    sodium chloride 0.9% flush 10 mL    [DISCONTINUED] haloperidol lactate injection 0.5 mg     Current Outpatient Medications on File Prior to Encounter   Medication Sig    acetaminophen (TYLENOL) 500 MG tablet Take 500 mg by mouth daily as needed for Pain.    allopurinoL (ZYLOPRIM) 100 MG tablet Take 1 tablet (100 mg total) by mouth once daily.    bumetanide (BUMEX) 1 MG tablet Take 1 tablet (1 mg total) by mouth every other day.    ferrous gluconate (FERGON) 324 MG tablet Take 1 tablet (324 mg total) by mouth daily with breakfast.    isosorbide mononitrate (IMDUR) 60 MG 24 hr tablet Take 1 tablet (60 mg total) by mouth every evening.    loperamide (IMODIUM) 2 mg capsule Take 2 mg by mouth 4 (four) times daily as needed for Diarrhea.    metoprolol succinate (TOPROL-XL) 25 MG 24 hr tablet Take 1 tablet (25 mg total) by mouth once daily.    omega-3 fatty acids/fish oil (FISH OIL-OMEGA-3 FATTY ACIDS) 300-1,000 mg capsule Take 1 capsule by mouth once daily.    oxymetazoline (AFRIN) 0.05 % nasal spray Use 2 sprays by Nasal route daily as needed (nose bleed). (Patient taking differently: 2 sprays by Nasal route every 2 (two) hours as needed (nose bleed).)    rosuvastatin (CRESTOR) 40 MG Tab TAKE 1 TABLET EVERY EVENING.    traZODone (DESYREL) 50 MG tablet Take 1 tablet (50 mg total) by mouth nightly as needed for Insomnia.    warfarin (COUMADIN) 5 MG tablet Take warfarin 7.5mg (1.5 tablets) PO Tuesday & Saturday, then take 5mg PO all other days or as instructed by Coumadin Clinic    [DISCONTINUED] amoxicillin-clavulanate 875-125mg (AUGMENTIN) 875-125 mg per tablet Take 1 tablet by mouth every 12 (twelve) hours.    [DISCONTINUED] diphenhydrAMINE (BENADRYL) 25 mg capsule  Take 50 mg by mouth nightly as needed for Insomnia.     Family History       Problem Relation (Age of Onset)    Alcohol abuse Father    Diabetes Brother    Heart disease Mother, Father, Brother, Sister    Heart failure Mother, Father, Brother    No Known Problems Sister, Maternal Grandmother, Maternal Grandfather, Paternal Grandmother, Paternal Grandfather, Maternal Aunt, Maternal Uncle, Paternal Aunt, Paternal Uncle          Tobacco Use    Smoking status: Former     Current packs/day: 0.00     Average packs/day: 1 pack/day for 20.0 years (20.0 ttl pk-yrs)     Types: Cigarettes     Start date: 1960     Quit date: 1980     Years since quittin.2     Passive exposure: Never    Smokeless tobacco: Never   Substance and Sexual Activity    Alcohol use: No    Drug use: No    Sexual activity: Not Currently     Partners: Female     ROS  General ROS: negative for weight loss, weight gain, fevers, chills, night sweats  ENT ROS: See HPI  Ophthalmic ROS: negative for blurry vision, decreased vision, double vision or itchy eyes  Cardiovascular ROS: negative for chest pain or dyspnea on exertion  Respiratory ROS: See HPI  Gastrointestinal ROS: negative for abdominal pain, change in bowel habits, black or bloody stools, nausea, emesis, or bloating  Genito-Urinary ROS: negative for dysuria, trouble voiding, or hematuria  Neurological ROS: negative for TIA or stroke symptoms  Endocrine ROS: negative for hair pattern changes or unexpected weight changes  Musculoskeletal ROS: negative for muscle pain, weakness, joint pain or joint swelling  Skin: negative for rash, wounds, skin breakdown, or issues otherwise  Hematological and Lymphatic ROS: negative for bleeding, bruising, swollen lymph nodes  Psychological ROS: negative for anxiety or depression    Objective:     Vital Signs (Most Recent):  Temp: 97.9 °F (36.6 °C) (12/15/23 0948)  Pulse: 63 (12/15/23 1333)  Resp: 18 (12/15/23 1333)  BP: (!) 178/72 (12/15/23 1333)  SpO2:  100 % (12/15/23 1333) Vital Signs (24h Range):  Temp:  [97.4 °F (36.3 °C)-97.9 °F (36.6 °C)] 97.9 °F (36.6 °C)  Pulse:  [55-67] 63  Resp:  [15-20] 18  SpO2:  [94 %-100 %] 100 %  BP: (141-178)/(55-74) 178/72     Weight: 78 kg (172 lb)  Body mass index is 28.62 kg/m².     Physical Exam  Gen: in NAD, appears stated age  Neuro: AAOx4, CN2-12 grossly intact BL; motor, sensory, and strength grossly intact BL  HEENT: Dried blood around nares, packing in place, no active bleeding noted. NTNC, EOMI, PERRLA, MMM; no thyromegaly or lymphadenopathy; no JVD appreciated  CVS: Mechanical click noted, normal rate, S1/S2 auscultated with no S3 or S4; capillary refill < 2 sec  Resp: lungs CTAB, no w/r/r; no belabored breathing or accessory muscle use appreciated   Abd: BS+ in all 4 quadrants; NTND, soft to palpation; no organomegaly appreciated   Extrem: 1+ ALEXANDRA BL             Significant Labs: All pertinent labs within the past 24 hours have been reviewed.    Significant Imaging: I have reviewed all pertinent imaging results/findings within the past 24 hours.

## 2023-12-15 NOTE — ASSESSMENT & PLAN NOTE
- INR 5.2 on admission  - Goal INR 2-3  - Holding warfarin, deferring Vitamin K as not actively bleeding  - Pharmacy consulted for warfarin dosing

## 2023-12-15 NOTE — ED TRIAGE NOTES
84 y/o M presents to ER with c/c epistaxis x 1 year. Pt states bleeding has been worse in last 2 weeks, was told to come to ER by ENT physician. Pt has had cauterization twice before. Pt is on warfarin for heart valve replacement. Denies c/p, SOB, N/V/D, fevers and chills. Endorses weakness.

## 2023-12-16 LAB
ALBUMIN SERPL BCP-MCNC: 2.9 G/DL (ref 3.5–5.2)
ALP SERPL-CCNC: 51 U/L (ref 55–135)
ALT SERPL W/O P-5'-P-CCNC: 13 U/L (ref 10–44)
ANION GAP SERPL CALC-SCNC: 7 MMOL/L (ref 8–16)
AST SERPL-CCNC: 18 U/L (ref 10–40)
BASOPHILS # BLD AUTO: 0.03 K/UL (ref 0–0.2)
BASOPHILS NFR BLD: 0.7 % (ref 0–1.9)
BILIRUB SERPL-MCNC: 0.4 MG/DL (ref 0.1–1)
BUN SERPL-MCNC: 47 MG/DL (ref 8–23)
CALCIUM SERPL-MCNC: 9.5 MG/DL (ref 8.7–10.5)
CHLORIDE SERPL-SCNC: 110 MMOL/L (ref 95–110)
CO2 SERPL-SCNC: 21 MMOL/L (ref 23–29)
CREAT SERPL-MCNC: 2.5 MG/DL (ref 0.5–1.4)
DIFFERENTIAL METHOD: ABNORMAL
EOSINOPHIL # BLD AUTO: 0.2 K/UL (ref 0–0.5)
EOSINOPHIL NFR BLD: 4.5 % (ref 0–8)
ERYTHROCYTE [DISTWIDTH] IN BLOOD BY AUTOMATED COUNT: 14.4 % (ref 11.5–14.5)
EST. GFR  (NO RACE VARIABLE): 25 ML/MIN/1.73 M^2
GLUCOSE SERPL-MCNC: 82 MG/DL (ref 70–110)
HCT VFR BLD AUTO: 23.1 % (ref 40–54)
HGB BLD-MCNC: 7.3 G/DL (ref 14–18)
IMM GRANULOCYTES # BLD AUTO: 0 K/UL (ref 0–0.04)
IMM GRANULOCYTES NFR BLD AUTO: 0 % (ref 0–0.5)
INR PPP: 4.2 (ref 0.8–1.2)
LYMPHOCYTES # BLD AUTO: 0.9 K/UL (ref 1–4.8)
LYMPHOCYTES NFR BLD: 19.2 % (ref 18–48)
MAGNESIUM SERPL-MCNC: 2.1 MG/DL (ref 1.6–2.6)
MCH RBC QN AUTO: 30.8 PG (ref 27–31)
MCHC RBC AUTO-ENTMCNC: 31.6 G/DL (ref 32–36)
MCV RBC AUTO: 98 FL (ref 82–98)
MONOCYTES # BLD AUTO: 0.5 K/UL (ref 0.3–1)
MONOCYTES NFR BLD: 11.5 % (ref 4–15)
NEUTROPHILS # BLD AUTO: 2.8 K/UL (ref 1.8–7.7)
NEUTROPHILS NFR BLD: 64.1 % (ref 38–73)
NRBC BLD-RTO: 0 /100 WBC
PHOSPHATE SERPL-MCNC: 3.7 MG/DL (ref 2.7–4.5)
PLATELET # BLD AUTO: 131 K/UL (ref 150–450)
PMV BLD AUTO: 10.8 FL (ref 9.2–12.9)
POTASSIUM SERPL-SCNC: 4.1 MMOL/L (ref 3.5–5.1)
PROT SERPL-MCNC: 6 G/DL (ref 6–8.4)
PROTHROMBIN TIME: 41.5 SEC (ref 9–12.5)
RBC # BLD AUTO: 2.37 M/UL (ref 4.6–6.2)
SODIUM SERPL-SCNC: 138 MMOL/L (ref 136–145)
WBC # BLD AUTO: 4.43 K/UL (ref 3.9–12.7)

## 2023-12-16 PROCEDURE — 80053 COMPREHEN METABOLIC PANEL: CPT | Mod: HCNC | Performed by: INTERNAL MEDICINE

## 2023-12-16 PROCEDURE — G0378 HOSPITAL OBSERVATION PER HR: HCPCS | Mod: HCNC

## 2023-12-16 PROCEDURE — 85025 COMPLETE CBC W/AUTO DIFF WBC: CPT | Mod: HCNC | Performed by: INTERNAL MEDICINE

## 2023-12-16 PROCEDURE — 94761 N-INVAS EAR/PLS OXIMETRY MLT: CPT | Mod: HCNC

## 2023-12-16 PROCEDURE — 25000003 PHARM REV CODE 250: Mod: HCNC | Performed by: STUDENT IN AN ORGANIZED HEALTH CARE EDUCATION/TRAINING PROGRAM

## 2023-12-16 PROCEDURE — 25000003 PHARM REV CODE 250: Mod: HCNC | Performed by: INTERNAL MEDICINE

## 2023-12-16 PROCEDURE — 85610 PROTHROMBIN TIME: CPT | Mod: HCNC | Performed by: INTERNAL MEDICINE

## 2023-12-16 PROCEDURE — 84100 ASSAY OF PHOSPHORUS: CPT | Mod: HCNC | Performed by: INTERNAL MEDICINE

## 2023-12-16 PROCEDURE — 25000003 PHARM REV CODE 250: Mod: HCNC | Performed by: EMERGENCY MEDICINE

## 2023-12-16 PROCEDURE — 83735 ASSAY OF MAGNESIUM: CPT | Mod: HCNC | Performed by: INTERNAL MEDICINE

## 2023-12-16 PROCEDURE — 36415 COLL VENOUS BLD VENIPUNCTURE: CPT | Mod: HCNC | Performed by: INTERNAL MEDICINE

## 2023-12-16 RX ADMIN — NASAL 1 SPRAY: 6.5 SPRAY NASAL at 06:12

## 2023-12-16 RX ADMIN — NASAL 1 SPRAY: 6.5 SPRAY NASAL at 05:12

## 2023-12-16 RX ADMIN — AMOXICILLIN AND CLAVULANATE POTASSIUM 500 MG: 500; 125 TABLET, FILM COATED ORAL at 09:12

## 2023-12-16 RX ADMIN — NASAL 1 SPRAY: 6.5 SPRAY NASAL at 02:12

## 2023-12-16 RX ADMIN — METOPROLOL SUCCINATE 25 MG: 25 TABLET, EXTENDED RELEASE ORAL at 09:12

## 2023-12-16 RX ADMIN — NASAL 1 SPRAY: 6.5 SPRAY NASAL at 09:12

## 2023-12-16 RX ADMIN — ALLOPURINOL 100 MG: 100 TABLET ORAL at 09:12

## 2023-12-16 RX ADMIN — BUMETANIDE 1 MG: 1 TABLET ORAL at 09:12

## 2023-12-16 RX ADMIN — Medication 324 MG: at 09:12

## 2023-12-16 RX ADMIN — Medication 1 CAPSULE: at 08:12

## 2023-12-16 RX ADMIN — Medication 6 MG: at 09:12

## 2023-12-16 RX ADMIN — ATORVASTATIN CALCIUM 80 MG: 40 TABLET, FILM COATED ORAL at 08:12

## 2023-12-16 RX ADMIN — ISOSORBIDE MONONITRATE 60 MG: 60 TABLET, EXTENDED RELEASE ORAL at 08:12

## 2023-12-16 RX ADMIN — NASAL 1 SPRAY: 6.5 SPRAY NASAL at 10:12

## 2023-12-16 RX ADMIN — AMOXICILLIN AND CLAVULANATE POTASSIUM 500 MG: 500; 125 TABLET, FILM COATED ORAL at 08:12

## 2023-12-16 NOTE — ASSESSMENT & PLAN NOTE
Chronic history, now appears to be recovered as most recent TTE 4/2023 with EF 60%, moderate MR. Saturating well on RA. Lung sounds clear. Notable for 1+ BL LAEXANDRA which is new for him. 's on admission  - S/p IV 40mg Lasix in ED  - Strict I/O's, daily volume assessments  - Continue home bumex, Imdur, metoprolol

## 2023-12-16 NOTE — SUBJECTIVE & OBJECTIVE
Interval History: Pt seen and examined this morning on shahida OLIVO. Reports some blood from right nostril overnight, however not much. Otherwise he has no complaints. INR 4.2 this AM, continue to hold warfarin.      Objective:     Vital Signs (Most Recent):  Temp: 98.4 °F (36.9 °C) (12/16/23 0747)  Pulse: 64 (12/16/23 0747)  Resp: 18 (12/16/23 0747)  BP: (!) 117/56 (12/16/23 0747)  SpO2: 97 % (12/16/23 0747) Vital Signs (24h Range):  Temp:  [97.3 °F (36.3 °C)-98.4 °F (36.9 °C)] 98.4 °F (36.9 °C)  Pulse:  [52-89] 64  Resp:  [16-20] 18  SpO2:  [96 %-100 %] 97 %  BP: (115-178)/(56-87) 117/56     Weight: 78 kg (172 lb)  Body mass index is 28.62 kg/m².    Intake/Output Summary (Last 24 hours) at 12/16/2023 1036  Last data filed at 12/15/2023 1636  Gross per 24 hour   Intake 258 ml   Output 975 ml   Net -717 ml      Physical Exam  Gen: in NAD, appears stated age  Neuro: AAOx4, CN2-12 grossly intact BL; motor, sensory, and strength grossly intact BL  HEENT: Dried blood around nares, packing in place, no active bleeding noted. NTNC, EOMI, PERRLA, MMM; no thyromegaly or lymphadenopathy; no JVD appreciated  CVS: Mechanical click noted, normal rate, S1/S2 auscultated with no S3 or S4; capillary refill < 2 sec  Resp: lungs CTAB, no w/r/r; no belabored breathing or accessory muscle use appreciated   Abd: BS+ in all 4 quadrants; NTND, soft to palpation; no organomegaly appreciated   Extrem: pulses full, equal, and regular over all 4 extremities; no UE or LE edema BL        MELD 3.0: 29 at 12/16/2023  2:54 AM  MELD-Na: 31 at 12/16/2023  2:54 AM  Calculated from:  Serum Creatinine: 2.5 mg/dL at 12/16/2023  2:54 AM  Serum Sodium: 138 mmol/L (Using max of 137 mmol/L) at 12/16/2023  2:54 AM  Total Bilirubin: 0.4 mg/dL (Using min of 1 mg/dL) at 12/16/2023  2:54 AM  Serum Albumin: 2.9 g/dL at 12/16/2023  2:54 AM  INR(ratio): 4.2 at 12/16/2023  2:54 AM  Age at listing (hypothetical): 83 years  Sex: Male at 12/16/2023  2:54  AM      Significant Labs:  CBC:  Recent Labs   Lab 12/15/23  0956 12/16/23  0254   WBC 5.47 4.43   HGB 6.9* 7.3*   HCT 22.0* 23.1*   * 131*     CMP:  Recent Labs   Lab 12/15/23  0956 12/16/23  0254    138   K 4.6 4.1   * 110   CO2 20* 21*   * 82   BUN 50* 47*   CREATININE 2.8* 2.5*   CALCIUM 9.7 9.5   PROT 6.7 6.0   ALBUMIN 3.2* 2.9*   BILITOT 0.3 0.4   ALKPHOS 60 51*   AST 19 18   ALT 14 13   ANIONGAP 8 7*     PTINR:  Recent Labs   Lab 12/15/23  0956 12/16/23  0254   INR 5.2* 4.2*       Significant Procedures:   Dobutamine Stress Test with Color Flow: No results found. However, due to the size of the patient record, not all encounters were searched. Please check Results Review for a complete set of results.

## 2023-12-16 NOTE — HOSPITAL COURSE
Patient admitted for recurrent epistaxis. Plan for cauterization 12/18 per ENT. INR downtrending with holding warfarin. Pharmacy following for warfarin titration. Patient required 1u pRBC transfusion on admission for acute blood loss anemia. Hgb remained stable and had minimal nose bleeds. Plan for cauterization 12/18 with ENT. Hgb remained stable and no bleeds after procedure. INR day of discharge 1.7, pharmacy recommending warfarin 5mg upon discharge with close follow up with coumadin clinic which they contacted. Patient stable for discharge.

## 2023-12-16 NOTE — PROGRESS NOTES
[facility] Medicine  Progress Note    Patient Name: Dariel Urbina  MRN: [id number]  Patient Class: OP- Observation   Admission Date: 12/15/2023  Length of Stay: 1 days  Attending Physician: Octavio Santos MD  Primary Care Provider: Devon Langston Jr., MD        Subjective:     Principal Problem:Recurrent epistaxis        HPI:  Dariel Urbina is an 83 year old male with a past medical history of CAD s/p CABG 1990's, mechanical AVR (on warfarin) c/b mod-severe MR w/ mitraclip, afib, GIB 2/2 diverticulosis s/p partial colon resection, T2DM (diet controlled), gout, HLD, HTN, CKD4, and recurrent epistaxis on warfarin that presents with recurrent nose bleed. He reports he has had 2 surgeries with ENT in the past. His has had on and off again nose bleeds for the past 2 ½ weeks and decided to come in today because he was feeling more dyspneic. Per chart review, cauterization of left septum was performed on 11/3/23 which initially helped.  He was seen in ENT clinic today and had nasal packing placed and [place] [facility] [place] and workup as the patient has been feeling short of breath.     He reports being compliant with his home warfarin which he takes 5mg nightly. INR here was supratherapeutic at 5.2. Reports last dose of warfarin was yesterday. He also reports swelling in his lower extremities for the past week which is new for him. He denies F/C, N/V, chest pain, abdominal pain, dysuria.     Upon arrival to the ED, patient afebrile, HR 60s, -160's/70's, saturating 100% on RA. Labs notable for Hgb 6.9, Plt count 144, INR 5.2 (on warfarin, goal 2-3), Cr 2.8 (BL 1.8-2.8), trop 0.055 -> 0.047, .    Overview/Hospital Course:  Patient admitted for recurrent epistaxis. Plan for cauterization 12/18 per ENT. INR downtrending with holding warfarin. Pharmacy following for warfarin titration. Patient required 1u pRBC transfusion on admission for acute blood loss anemia.    Interval History:  Pt seen and examined this morning on shahida OLIVO. Reports some blood from right nostril overnight, however not much. Otherwise he has no complaints. INR 4.2 this AM, continue to hold warfarin.      Objective:     Vital Signs (Most Recent):  Temp: 98.4 °F (36.9 °C) (12/16/23 0747)  Pulse: 64 (12/16/23 0747)  Resp: 18 (12/16/23 0747)  BP: (!) 117/56 (12/16/23 0747)  SpO2: 97 % (12/16/23 0747) Vital Signs (24h Range):  Temp:  [97.3 °F (36.3 °C)-98.4 °F (36.9 °C)] 98.4 °F (36.9 °C)  Pulse:  [52-89] 64  Resp:  [16-20] 18  SpO2:  [96 %-100 %] 97 %  BP: (115-178)/(56-87) 117/56     Weight: 78 kg (172 lb)  Body mass index is 28.62 kg/m².    Intake/Output Summary (Last 24 hours) at 12/16/2023 1036  Last data filed at 12/15/2023 1636  Gross per 24 hour   Intake 258 ml   Output 975 ml   Net -717 ml      Physical Exam  Gen: in NAD, appears stated age  Neuro: AAOx4, CN2-12 grossly intact BL; motor, sensory, and strength grossly intact BL  HEENT: Dried blood around nares, packing in place, no active bleeding noted. NTNC, EOMI, PERRLA, MMM; no thyromegaly or lymphadenopathy; no JVD appreciated  CVS: Mechanical click noted, normal rate, S1/S2 auscultated with no S3 or S4; capillary refill < 2 sec  Resp: lungs CTAB, no w/r/r; no belabored breathing or accessory muscle use appreciated   Abd: BS+ in all 4 quadrants; NTND, soft to palpation; no organomegaly appreciated   Extrem: pulses full, equal, and regular over all 4 extremities; no UE or LE edema BL        MELD 3.0: 29 at 12/16/2023  2:54 AM  MELD-Na: 31 at 12/16/2023  2:54 AM  Calculated from:  Serum Creatinine: 2.5 mg/dL at 12/16/2023  2:54 AM  Serum Sodium: 138 mmol/L (Using max of 137 mmol/L) at 12/16/2023  2:54 AM  Total Bilirubin: 0.4 mg/dL (Using min of 1 mg/dL) at 12/16/2023  2:54 AM  Serum Albumin: 2.9 g/dL at 12/16/2023  2:54 AM  INR(ratio): 4.2 at 12/16/2023  2:54 AM  Age at listing (hypothetical): 83 years  Sex: Male at 12/16/2023  2:54 AM      Significant  Labs:  CBC:  Recent Labs   Lab 12/15/23  0956 12/16/23  0254   WBC 5.47 4.43   HGB 6.9* 7.3*   HCT 22.0* 23.1*   * 131*     CMP:  Recent Labs   Lab 12/15/23  0956 12/16/23  0254    138   K 4.6 4.1   * 110   CO2 20* 21*   * 82   BUN 50* 47*   CREATININE 2.8* 2.5*   CALCIUM 9.7 9.5   PROT 6.7 6.0   ALBUMIN 3.2* 2.9*   BILITOT 0.3 0.4   ALKPHOS 60 51*   AST 19 18   ALT 14 13   ANIONGAP 8 7*     PTINR:  Recent Labs   Lab 12/15/23  0956 12/16/23  0254   INR 5.2* 4.2*       Significant Procedures:   Dobutamine Stress Test with Color Flow: No results found. However, due to the size of the patient record, not all encounters were searched. Please check Results Review for a complete set of results.    Assessment/Plan:      * Recurrent epistaxis  Patient with history of recurrent epistaxis while on warfarin. Has had 2 cauterizations with ENT, last one in November of this year. Seen in ENT clinic today for continued bleeding for past 2.5 weeks. S/p left Merocel placed in ENT clinic on 12/15. Now presenting with acute blood loss anemia requiring RBC transfusion in setting of supra-therapeutic warfarin (INR 5.2)   - ENT consulted, appreciate recs  - Sodium chloride nasal spray BL Q4H while awake  - Oxymetazoline nasal spray to left PRN for epistaxis  - Augmentin BID while nasal packing in place  - Continue hold warfarin while supratherapeutic  - Daily INR's, pharmacy to assist in dosing  - ENT to plan for OR Monday 12/18 for nasal endoscopy and cauterization of epistaxis    Acute blood loss anemia  Patient's anemia is currently controlled. Has received 1 units of PRBCs on 1 . Etiology likely d/t acute blood loss which was from epistaxis  Current CBC reviewed-   Lab Results   Component Value Date    HGB 7.3 (L) 12/16/2023    HCT 23.1 (L) 12/16/2023   - Transfuse to goal Hgb > 7  - Monitor serial CBC and transfuse if patient becomes hemodynamically unstable, symptomatic or H/H drops below 7/21.  - Daily  INR's  - Holding warfarin; goal INR 2-3    Supratherapeutic INR  - INR 5.2 on admission  - Goal INR 2-3  - Holding warfarin, INR improving  - Pharmacy consulted for warfarin dosing    Chronic combined systolic and diastolic heart failure  Chronic history, now appears to be recovered as most recent TTE 4/2023 with EF 60%, moderate MR. Saturating well on RA. Lung sounds clear. Notable for 1+ BL ALEXANDRA which is new for him. 's on admission  - S/p IV 40mg Lasix in ED  - Strict I/O's, daily volume assessments  - Continue home bumex, Imdur, metoprolol    CKD (chronic kidney disease), stage IV  Creatine stable for now. BMP reviewed- noted Estimated Creatinine Clearance: 21.9 mL/min (A) (based on SCr of 2.5 mg/dL (H)). according to latest data. 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   - BL creatinine 1.8-2.8. Continues to have good UOP, no electrolyte abnormalities.  - Monitor UOP and serial BMP and adjust therapy as needed.   - Renally dose meds.   - Avoid nephrotoxic medications and procedures.    Type 2 diabetes mellitus with diabetic peripheral angiopathy without gangrene  - History of T2DM, Latest A1c 6.1  - Diet controlled, not on home DM meds  - SSI here  - POCT glucose checks as ordered  - Hypoglycemic protocol in effect    Paroxysmal atrial fibrillation  - Currently in afib on EKG, stable rate  - Continue home regimen of metop for rate control  - Supra-therapeutic, holding warfarin  - Will continue to monitor on tele    Coronary artery disease involving native coronary artery of native heart without angina pectoris  History of CAD s/p CABG in 1990's  - Continue statin    History of gout  - continue allopurinol    Renovascular hypertension  - Working to optimize BP control  - Continue home regimen of Imdur, metop  - Will continue to monitor and further titrate antihypertensives as clinically indicated     Hyperlipidemia associated with type 2 diabetes mellitus  - continue home statin        VTE  Risk Mitigation (From admission, onward)           Ordered     IP VTE HIGH RISK PATIENT  Once         12/15/23 1342     Place sequential compression device  Until discontinued         12/15/23 1342                    Discharge Planning   ASTER:      Code Status: Full Code   Is the patient medically ready for discharge?: No    Reason for patient still in hospital (select all that apply): Treatment                     Octavio Santos MD  Department of Hospital Medicine   [facility] Surg

## 2023-12-16 NOTE — ASSESSMENT & PLAN NOTE
Creatine stable for now. BMP reviewed- noted Estimated Creatinine Clearance: 21.9 mL/min (A) (based on SCr of 2.5 mg/dL (H)). according to latest data. 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   - BL creatinine 1.8-2.8. Continues to have good UOP, no electrolyte abnormalities.  - Monitor UOP and serial BMP and adjust therapy as needed.   - Renally dose meds.   - Avoid nephrotoxic medications and procedures.

## 2023-12-16 NOTE — NURSING
Nurses Note -- 4 Eyes      12/15/2023   07:29 PM      Skin assessed during: Admit      [x] No Altered Skin Integrity Present    [x]Prevention Measures Documented      [] Yes- Altered Skin Integrity Present or Discovered   [] LDA Added if Not in Epic (Describe Wound)   [] New Altered Skin Integrity was Present on Admit and Documented in LDA   [] Wound Image Taken    Wound Care Consulted? No    Attending Nurse:  Woody Varela RN    Second RN/Staff Member:  Tremaine Recinos RN

## 2023-12-16 NOTE — PHARMACY MED REC
"Admission Medication History     The home medication history was taken by Raimundo Frye.    You may go to "Admission" then "Reconcile Home Medications" tabs to review and/or act upon these items.     The home medication list has been updated by the Pharmacy department.   Please read ALL comments highlighted in yellow.   Please address this information as you see fit.    Feel free to contact us if you have any questions or require assistance.      The medications listed below were removed from the home medication list. Please reorder if appropriate:  Patient reports no longer taking the following medication(s):  AMOXICILLIN-CLAVULANATE 875-125 MG TABLET  DIPHENHYDRAMINE 25 MF CAPSULE  LOPERAMIDE 2 MG CAPSULE  OXYMETAZOLINE 0.05% NASAL SPRAY    Medications listed below were obtained from: Patient/family and Analytic software- [employer] Medications   Medication Sig    acetaminophen (TYLENOL) 500 MG tablet   Take 500 mg by mouth daily as needed for pain.    allopurinoL (ZYLOPRIM) 100 MG tablet   Take 1 tablet (100 mg total) by mouth once daily.    bumetanide (BUMEX) 1 MG tablet   Take 1 tablet (1 mg total) by mouth every other day.    ferrous gluconate (FERGON) 324 MG tablet   Take 1 tablet (324 mg total) by mouth daily with breakfast.    isosorbide mononitrate (IMDUR) 60 MG 24 hr tablet   Take 1 tablet (60 mg total) by mouth every evening.    metoprolol succinate (TOPROL-XL) 25 MG 24 hr tablet   Take 1 tablet (25 mg total) by mouth once daily.    omega-3 fatty acids/fish oil (FISH OIL-OMEGA-3 FATTY ACIDS) 300-1,000 mg capsule   Take 1 capsule by mouth once daily.    rosuvastatin (CRESTOR) 40 MG Tab   Take 1 tablet by mouth every evening.    traZODone (DESYREL) 50 MG tablet   Take 1 tablet (50 mg total) by mouth nightly as needed for insomnia.    warfarin (COUMADIN) 5 MG tablet   Take warfarin 7.5mg (1.5 tablets) PO Tuesday & Saturday, then take 5mg PO all other days or as instructed by [facility]   "         Raimundo Frye  EXT [id number]                  .

## 2023-12-16 NOTE — PLAN OF CARE
[facility] - Med Surg  Initial Discharge Assessment       Primary Care Provider: Devon Langston Jr., MD    Admission Diagnosis: Fatigue [R53.83]  Recurrent epistaxis [R04.0]  Chest pain [R07.9]    Admission Date: 12/15/2023  Expected Discharge Date:     Transition of Care Barriers: None    Payor: HU[employer] MANAGED MEDICARE / Plan: HU[employer] MEDICARE HMO / Product Type: Capitation /     Extended Emergency Contact Information  Primary Emergency Contact: Ameena Urbina  Address: [address] [place] [address] [place] [address] [facility]  Home Phone: [phone]  Mobile Phone: [phone]  Relation: Spouse    Discharge Plan A: Home with family  Discharge Plan B: [facility] - [place], [place] - [address]  [address] [address]  Phone: [phone] Fax: [phone]    [facility]  [address] [facility] [address]  Phone: [phone] Fax: [phone]    [id number] - [place], [place] - [address]  [address]  [place] [address]  Phone: [phone] Fax: [phone]      Initial Assessment (most recent)       Adult Discharge Assessment - 12/16/23 1355          Discharge Assessment    Assessment Type Discharge Planning Assessment     Confirmed/corrected address, phone number and insurance Yes     Confirmed Demographics Correct on Facesheet     Source of Information patient;family     Does patient/caregiver understand observation status Yes     Communicated ASTER with patient/caregiver Yes     Reason For Admission Nose bleed     People in Home spouse     Facility Arrived From: Home     Do you expect to return to your current living situation? Yes     Do you have help at home or someone to help you manage your care at home? Yes     Who are your caregiver(s) and their phone number(s)? Spouse Ameena     Prior to hospitilization cognitive status: Alert/Oriented     Current cognitive status: Alert/Oriented     Walking or Climbing  Stairs Difficulty no     Dressing/Bathing Difficulty no     Home Accessibility wheelchair accessible     Equipment Currently Used at Home none     Readmission within 30 days? No     Patient currently being followed by outpatient case management? No     Do you currently have service(s) that help you manage your care at home? No     Do you take prescription medications? Yes     Do you have prescription coverage? Yes     Do you have any problems affording any of your prescribed medications? No     Is the patient taking medications as prescribed? yes     Who is going to help you get home at discharge? Spouse     How do you get to doctors appointments? car, drives self     Are you on dialysis? No     Do you take coumadin? No     Discharge Plan A Home with family     Discharge Plan B Home Health     DME Needed Upon Discharge  none     Discharge Plan discussed with: Spouse/sig other;Patient     Name(s) and Number(s) Ameena Spouse     Transition of Care Barriers None        Physical Activity    On average, how many days per week do you engage in moderate to strenuous exercise (like a brisk walk)? 1 day     On average, how many minutes do you engage in exercise at this level? 10 min        Financial Resource Strain    How hard is it for you to pay for the very basics like food, housing, medical care, and heating? Not hard at all        Housing Stability    In the last 12 months, was there a time when you were not able to pay the mortgage or rent on time? No     In the last 12 months, how many places have you lived? 1     In the last 12 months, was there a time when you did not have a steady place to sleep or slept in a shelter (including now)? No        Transportation Needs    In the past 12 months, has lack of transportation kept you from medical appointments or from getting medications? No     In the past 12 months, has lack of transportation kept you from meetings, work, or from getting things needed for daily living? No         Food Insecurity    Within the past 12 months, you worried that your food would run out before you got the money to buy more. Never true     Within the past 12 months, the food you bought just didn't last and you didn't have money to get more. Never true        Stress    Do you feel stress - tense, restless, nervous, or anxious, or unable to sleep at night because your mind is troubled all the time - these days? Only a little        Social Connections    In a typical week, how many times do you talk on the phone with family, friends, or neighbors? More than three times a week     How often do you get together with friends or relatives? More than three times a week     How often do you attend Christian or Orthodoxy services? More than 4 times per year     Do you belong to any clubs or organizations such as Christian groups, unions, fraternal or athletic groups, or school groups? No     How often do you attend meetings of the clubs or organizations you belong to? Never     Are you , , , , never , or living with a partner?         Alcohol Use    Q1: How often do you have a drink containing alcohol? Never     Q2: How many drinks containing alcohol do you have on a typical day when you are drinking? Patient does not drink     Q3: How often do you have six or more drinks on one occasion? Never        OTHER    Name(s) of People in Home Ameena Ciara Spouse                      Discharge Plan A and Plan B have been determined by review of patient's clinical status, future medical and therapeutic needs, and coverage/benefits for post-acute care in coordination with multidisciplinary team members.

## 2023-12-16 NOTE — ED NOTES
Telemetry Verification   Patient placed on Telemetry Box  Verified with War Room  Box # [id number]   Monitor Tech Peter   Rate 69   Rhythm NS

## 2023-12-16 NOTE — ASSESSMENT & PLAN NOTE
- INR 5.2 on admission  - Goal INR 2-3  - Holding warfarin, INR improving  - Pharmacy consulted for warfarin dosing

## 2023-12-16 NOTE — ASSESSMENT & PLAN NOTE
Patient with history of recurrent epistaxis while on warfarin. Has had 2 cauterizations with ENT, last one in November of this year. Seen in ENT clinic today for continued bleeding for past 2.5 weeks. S/p left Merocel placed in ENT clinic on 12/15. Now presenting with acute blood loss anemia requiring RBC transfusion in setting of supra-therapeutic warfarin (INR 5.2)   - ENT consulted, appreciate recs  - Sodium chloride nasal spray BL Q4H while awake  - Oxymetazoline nasal spray to left PRN for epistaxis  - Augmentin BID while nasal packing in place  - Continue hold warfarin while supratherapeutic  - Daily INR's, pharmacy to assist in dosing  - ENT to plan for OR Monday 12/18 for nasal endoscopy and cauterization of epistaxis

## 2023-12-16 NOTE — PLAN OF CARE
Problem: Adult Inpatient Plan of Care  Goal: Plan of Care Review  Outcome: Ongoing, Progressing  Flowsheets (Taken 12/16/2023 0429)  Plan of Care Reviewed With: patient  Goal: Patient-Specific Goal (Individualized)  Outcome: Ongoing, Progressing  Flowsheets (Taken 12/16/2023 0429)  Anxieties, Fears or Concerns: none  Individualized Care Needs: monitor epistaxis  Goal: Absence of Hospital-Acquired Illness or Injury  Outcome: Ongoing, Progressing  Intervention: Identify and Manage Fall Risk  Flowsheets (Taken 12/16/2023 0429)  Safety Promotion/Fall Prevention:   assistive device/personal item within reach   bed alarm refused   lighting adjusted   medications reviewed   instructed to call staff for mobility  Intervention: Prevent Skin Injury  Flowsheets (Taken 12/16/2023 0429)  Body Position: position changed independently  Skin Protection: adhesive use limited  Intervention: Prevent and Manage VTE (Venous Thromboembolism) Risk  Flowsheets (Taken 12/16/2023 0429)  Activity Management:   Arm raise - L1   Rolling - L1  VTE Prevention/Management: bleeding precations maintained  Range of Motion: active ROM (range of motion) encouraged  Intervention: Prevent Infection  Flowsheets (Taken 12/16/2023 0429)  Infection Prevention:   environmental surveillance performed   hand hygiene promoted   rest/sleep promoted  Goal: Optimal Comfort and Wellbeing  Outcome: Ongoing, Progressing  Intervention: Monitor Pain and Promote Comfort  Flowsheets (Taken 12/16/2023 0429)  Pain Management Interventions:   quiet environment facilitated   relaxation techniques promoted   care clustered     Problem: Fall Injury Risk  Goal: Absence of Fall and Fall-Related Injury  Outcome: Ongoing, Progressing  Intervention: Identify and Manage Contributors  Flowsheets (Taken 12/16/2023 0429)  Self-Care Promotion: independence encouraged  Medication Review/Management: medications reviewed    Problem: Adult Inpatient Plan of Care  Goal: Readiness for  Transition of Care  Outcome: Ongoing, Not Progressing    Patient has a nasal pack on her left nostril. Gauze placed outside of his nose with some traces of blood, then replaced it with a new one. No episodes of epistaxis noted on my shift.

## 2023-12-16 NOTE — ASSESSMENT & PLAN NOTE
- Currently in afib on EKG, stable rate  - Continue home regimen of metop for rate control  - Supra-therapeutic, holding warfarin  - Will continue to monitor on tele

## 2023-12-17 LAB
ALBUMIN SERPL BCP-MCNC: 2.9 G/DL (ref 3.5–5.2)
ALP SERPL-CCNC: 69 U/L (ref 55–135)
ALT SERPL W/O P-5'-P-CCNC: 13 U/L (ref 10–44)
ANION GAP SERPL CALC-SCNC: 12 MMOL/L (ref 8–16)
AST SERPL-CCNC: 19 U/L (ref 10–40)
BASOPHILS # BLD AUTO: 0.03 K/UL (ref 0–0.2)
BASOPHILS NFR BLD: 0.6 % (ref 0–1.9)
BILIRUB SERPL-MCNC: 0.4 MG/DL (ref 0.1–1)
BUN SERPL-MCNC: 53 MG/DL (ref 8–23)
CALCIUM SERPL-MCNC: 9.4 MG/DL (ref 8.7–10.5)
CHLORIDE SERPL-SCNC: 109 MMOL/L (ref 95–110)
CO2 SERPL-SCNC: 21 MMOL/L (ref 23–29)
CREAT SERPL-MCNC: 2.7 MG/DL (ref 0.5–1.4)
DIFFERENTIAL METHOD: ABNORMAL
EOSINOPHIL # BLD AUTO: 0.2 K/UL (ref 0–0.5)
EOSINOPHIL NFR BLD: 4.6 % (ref 0–8)
ERYTHROCYTE [DISTWIDTH] IN BLOOD BY AUTOMATED COUNT: 14.9 % (ref 11.5–14.5)
EST. GFR  (NO RACE VARIABLE): 22.8 ML/MIN/1.73 M^2
GLUCOSE SERPL-MCNC: 94 MG/DL (ref 70–110)
HCT VFR BLD AUTO: 22.5 % (ref 40–54)
HGB BLD-MCNC: 7.3 G/DL (ref 14–18)
IMM GRANULOCYTES # BLD AUTO: 0.01 K/UL (ref 0–0.04)
IMM GRANULOCYTES NFR BLD AUTO: 0.2 % (ref 0–0.5)
INR PPP: 2.9 (ref 0.8–1.2)
LYMPHOCYTES # BLD AUTO: 1 K/UL (ref 1–4.8)
LYMPHOCYTES NFR BLD: 19.6 % (ref 18–48)
MAGNESIUM SERPL-MCNC: 2.2 MG/DL (ref 1.6–2.6)
MCH RBC QN AUTO: 31.1 PG (ref 27–31)
MCHC RBC AUTO-ENTMCNC: 32.4 G/DL (ref 32–36)
MCV RBC AUTO: 96 FL (ref 82–98)
MONOCYTES # BLD AUTO: 0.5 K/UL (ref 0.3–1)
MONOCYTES NFR BLD: 10.8 % (ref 4–15)
NEUTROPHILS # BLD AUTO: 3.2 K/UL (ref 1.8–7.7)
NEUTROPHILS NFR BLD: 64.2 % (ref 38–73)
NRBC BLD-RTO: 0 /100 WBC
PHOSPHATE SERPL-MCNC: 4.3 MG/DL (ref 2.7–4.5)
PLATELET # BLD AUTO: 129 K/UL (ref 150–450)
PMV BLD AUTO: 10 FL (ref 9.2–12.9)
POTASSIUM SERPL-SCNC: 4.4 MMOL/L (ref 3.5–5.1)
PROT SERPL-MCNC: 6 G/DL (ref 6–8.4)
PROTHROMBIN TIME: 29.4 SEC (ref 9–12.5)
RBC # BLD AUTO: 2.35 M/UL (ref 4.6–6.2)
SODIUM SERPL-SCNC: 142 MMOL/L (ref 136–145)
WBC # BLD AUTO: 4.99 K/UL (ref 3.9–12.7)

## 2023-12-17 PROCEDURE — 25000003 PHARM REV CODE 250: Mod: HCNC | Performed by: INTERNAL MEDICINE

## 2023-12-17 PROCEDURE — 25000003 PHARM REV CODE 250: Mod: HCNC | Performed by: STUDENT IN AN ORGANIZED HEALTH CARE EDUCATION/TRAINING PROGRAM

## 2023-12-17 PROCEDURE — 94761 N-INVAS EAR/PLS OXIMETRY MLT: CPT | Mod: HCNC

## 2023-12-17 PROCEDURE — 83735 ASSAY OF MAGNESIUM: CPT | Mod: HCNC | Performed by: INTERNAL MEDICINE

## 2023-12-17 PROCEDURE — 36415 COLL VENOUS BLD VENIPUNCTURE: CPT | Mod: HCNC | Performed by: INTERNAL MEDICINE

## 2023-12-17 PROCEDURE — 80053 COMPREHEN METABOLIC PANEL: CPT | Mod: HCNC | Performed by: INTERNAL MEDICINE

## 2023-12-17 PROCEDURE — 85610 PROTHROMBIN TIME: CPT | Mod: HCNC | Performed by: INTERNAL MEDICINE

## 2023-12-17 PROCEDURE — 85025 COMPLETE CBC W/AUTO DIFF WBC: CPT | Mod: HCNC | Performed by: INTERNAL MEDICINE

## 2023-12-17 PROCEDURE — 21400001 HC TELEMETRY ROOM: Mod: HCNC

## 2023-12-17 PROCEDURE — 84100 ASSAY OF PHOSPHORUS: CPT | Mod: HCNC | Performed by: INTERNAL MEDICINE

## 2023-12-17 RX ADMIN — Medication 324 MG: at 09:12

## 2023-12-17 RX ADMIN — ATORVASTATIN CALCIUM 80 MG: 40 TABLET, FILM COATED ORAL at 10:12

## 2023-12-17 RX ADMIN — ALLOPURINOL 100 MG: 100 TABLET ORAL at 09:12

## 2023-12-17 RX ADMIN — AMOXICILLIN AND CLAVULANATE POTASSIUM 500 MG: 500; 125 TABLET, FILM COATED ORAL at 10:12

## 2023-12-17 RX ADMIN — METOPROLOL SUCCINATE 25 MG: 25 TABLET, EXTENDED RELEASE ORAL at 09:12

## 2023-12-17 RX ADMIN — AMOXICILLIN AND CLAVULANATE POTASSIUM 500 MG: 500; 125 TABLET, FILM COATED ORAL at 09:12

## 2023-12-17 RX ADMIN — NASAL 1 SPRAY: 6.5 SPRAY NASAL at 06:12

## 2023-12-17 RX ADMIN — NASAL 1 SPRAY: 6.5 SPRAY NASAL at 02:12

## 2023-12-17 RX ADMIN — Medication 1 CAPSULE: at 10:12

## 2023-12-17 RX ADMIN — ISOSORBIDE MONONITRATE 60 MG: 60 TABLET, EXTENDED RELEASE ORAL at 10:12

## 2023-12-17 RX ADMIN — TRAZODONE HYDROCHLORIDE 50 MG: 50 TABLET ORAL at 10:12

## 2023-12-17 NOTE — SUBJECTIVE & OBJECTIVE
Interval History: Pt seen and examined this morning on rounds. NAEON. No nose bleeds. Hgb remains stable. Patient without complaints. INR 2.9 today. Plan for cauterization with ENT tomorrow. NPO midnight.       Objective:     Vital Signs (Most Recent):  Temp: 98 °F (36.7 °C) (12/17/23 0809)  Pulse: 62 (12/17/23 0944)  Resp: 18 (12/17/23 0944)  BP: 132/60 (12/17/23 0809)  SpO2: 98 % (12/17/23 0944) Vital Signs (24h Range):  Temp:  [97.4 °F (36.3 °C)-98 °F (36.7 °C)] 98 °F (36.7 °C)  Pulse:  [54-65] 62  Resp:  [16-18] 18  SpO2:  [94 %-98 %] 98 %  BP: (112-132)/(56-80) 132/60     Weight: 78 kg (172 lb)  Body mass index is 28.62 kg/m².    Intake/Output Summary (Last 24 hours) at 12/17/2023 0953  Last data filed at 12/17/2023 0610  Gross per 24 hour   Intake 625 ml   Output 900 ml   Net -275 ml      Physical Exam  Gen: in NAD, appears stated age  Neuro: AAOx4, CN2-12 grossly intact BL; motor, sensory, and strength grossly intact BL  HEENT: Dried blood around nares, packing in place, no active bleeding noted. NTNC, EOMI, PERRLA, MMM; no thyromegaly or lymphadenopathy; no JVD appreciated  CVS: Mechanical click noted, normal rate, S1/S2 auscultated with no S3 or S4; capillary refill < 2 sec  Resp: lungs CTAB, no w/r/r; no belabored breathing or accessory muscle use appreciated   Abd: BS+ in all 4 quadrants; NTND, soft to palpation; no organomegaly appreciated   Extrem: pulses full, equal, and regular over all 4 extremities; no UE or LE edema BL        MELD 3.0: 27 at 12/17/2023  3:51 AM  MELD-Na: 28 at 12/17/2023  3:51 AM  Calculated from:  Serum Creatinine: 2.7 mg/dL at 12/17/2023  3:51 AM  Serum Sodium: 142 mmol/L (Using max of 137 mmol/L) at 12/17/2023  3:51 AM  Total Bilirubin: 0.4 mg/dL (Using min of 1 mg/dL) at 12/17/2023  3:51 AM  Serum Albumin: 2.9 g/dL at 12/17/2023  3:51 AM  INR(ratio): 2.9 at 12/17/2023  3:51 AM  Age at listing (hypothetical): 83 years  Sex: Male at 12/17/2023  3:51 AM      Significant  Labs:  CBC:  Recent Labs   Lab 12/15/23  0956 12/16/23  0254 12/17/23  0351   WBC 5.47 4.43 4.99   HGB 6.9* 7.3* 7.3*   HCT 22.0* 23.1* 22.5*   * 131* 129*     CMP:  Recent Labs   Lab 12/15/23  0956 12/16/23  0254 12/17/23  0351    138 142   K 4.6 4.1 4.4   * 110 109   CO2 20* 21* 21*   * 82 94   BUN 50* 47* 53*   CREATININE 2.8* 2.5* 2.7*   CALCIUM 9.7 9.5 9.4   PROT 6.7 6.0 6.0   ALBUMIN 3.2* 2.9* 2.9*   BILITOT 0.3 0.4 0.4   ALKPHOS 60 51* 69   AST 19 18 19   ALT 14 13 13   ANIONGAP 8 7* 12     PTINR:  Recent Labs   Lab 12/15/23  0956 12/16/23  0254 12/17/23  0351   INR 5.2* 4.2* 2.9*       Significant Procedures:   Dobutamine Stress Test with Color Flow: No results found. However, due to the size of the patient record, not all encounters were searched. Please check Results Review for a complete set of results.

## 2023-12-17 NOTE — PROGRESS NOTES
[facility]  Otorhinolaryngology-Head & Neck Surgery  Progress Note    Subjective:     Post-Op Info:  Procedure(s) (LRB):  CAUTERIZATION, NOSE, ENDOSCOPIC (N/A)      Hospital Day: 3     Interval History:   Sitting on side of bed this am. Doing well. Mustache dressing still in place. No complaints of further bleeding.     Medications:  Continuous Infusions:  Scheduled Meds:   allopurinoL  100 mg Oral Daily    amoxicillin-clavulanate 500-125mg  1 tablet Oral BID    atorvastatin  80 mg Oral QHS    bumetanide  1 mg Oral Every other day    ferrous gluconate  324 mg Oral Daily with breakfast    isosorbide mononitrate  60 mg Oral QHS    metoprolol succinate  25 mg Oral Daily    omega 3-dha-epa-fish oil  1 capsule Oral QHS    sodium chloride  1 spray Each Nostril Q4H While awake     PRN Meds:0.9%  NaCl infusion (for blood administration), acetaminophen, aluminum-magnesium hydroxide-simethicone, dextrose 10%, dextrose 10%, dextrose, dextrose, glucagon (human recombinant), melatonin, naloxone, ondansetron, oxymetazoline, polyethylene glycol, prochlorperazine, sodium chloride 0.9%, sodium chloride 0.9%, traZODone     Review of patient's allergies indicates:   Allergen Reactions    Lisinopril     Losartan      Intolerance- elevates potassium level     Objective:     Vital Signs (24h Range):  Temp:  [97.4 °F (36.3 °C)-99.1 °F (37.3 °C)] 99.1 °F (37.3 °C)  Pulse:  [54-65] 54  Resp:  [16-18] 18  SpO2:  [94 %-98 %] 97 %  BP: (112-132)/(56-80) 131/78       Lines/Drains/Airways       Peripheral Intravenous Line  Duration                  Peripheral IV - Single Lumen 12/15/23 1123 20 G Anterior;Right Forearm 2 days                     Physical Exam     Sitting on side of bed  Mustache dressing in place  Left merocel in place, taped to L cheek   Oropharynx patent  No blood pooling or streaking in the OP   Breathing comfortably     Significant Labs:  CBC:   Recent Labs   Lab 12/17/23  0351   WBC 4.99   RBC 2.35*   HGB 7.3*   HCT  22.5*   *   MCV 96   MCH 31.1*   MCHC 32.4     Coagulation:   Recent Labs   Lab 12/15/23  0956 12/16/23  0254 12/17/23  0351   LABPROT 50.1*   < > 29.4*   INR 5.2*   < > 2.9*   APTT 39.3*  --   --     < > = values in this interval not displayed.       Significant Diagnostics:  None  Assessment/Plan:     * Recurrent epistaxis  83M with pAF, mechanical aortic valve (on warfarin), and recurrent epistaxis s/p multiple procedures. Left Merocel placed in ENT clinic on 12/15. Presented to ED for anemia in CKD setting. Anticipation of OR on 12/18.     -- Sodium chloride nasal spray bilaterally Q4H while awake  -- Oxymetazoline nasal spray on the left PRN for epistaxis  -- Augmentin BID while nasal packing in place  -- To OR on Monday 12/18 for nasal endoscopy and cauterization of epistaxis  -- Page ENT with questions or concerns         Keira Mcmillan MD  Otorhinolaryngology-Head & Neck Surgery  [facility]

## 2023-12-17 NOTE — SUBJECTIVE & OBJECTIVE
Interval History:   Sitting on side of bed this am. Doing well. Mustache dressing still in place. No complaints of further bleeding.     Medications:  Continuous Infusions:  Scheduled Meds:   allopurinoL  100 mg Oral Daily    amoxicillin-clavulanate 500-125mg  1 tablet Oral BID    atorvastatin  80 mg Oral QHS    bumetanide  1 mg Oral Every other day    ferrous gluconate  324 mg Oral Daily with breakfast    isosorbide mononitrate  60 mg Oral QHS    metoprolol succinate  25 mg Oral Daily    omega 3-dha-epa-fish oil  1 capsule Oral QHS    sodium chloride  1 spray Each Nostril Q4H While awake     PRN Meds:0.9%  NaCl infusion (for blood administration), acetaminophen, aluminum-magnesium hydroxide-simethicone, dextrose 10%, dextrose 10%, dextrose, dextrose, glucagon (human recombinant), melatonin, naloxone, ondansetron, oxymetazoline, polyethylene glycol, prochlorperazine, sodium chloride 0.9%, sodium chloride 0.9%, traZODone     Review of patient's allergies indicates:   Allergen Reactions    Lisinopril     Losartan      Intolerance- elevates potassium level     Objective:     Vital Signs (24h Range):  Temp:  [97.4 °F (36.3 °C)-99.1 °F (37.3 °C)] 99.1 °F (37.3 °C)  Pulse:  [54-65] 54  Resp:  [16-18] 18  SpO2:  [94 %-98 %] 97 %  BP: (112-132)/(56-80) 131/78       Lines/Drains/Airways       Peripheral Intravenous Line  Duration                  Peripheral IV - Single Lumen 12/15/23 1123 20 G Anterior;Right Forearm 2 days                     Physical Exam     Sitting on side of bed  Mustache dressing in place  Left merocel in place, taped to L cheek   Oropharynx patent  No blood pooling or streaking in the OP   Breathing comfortably     Significant Labs:  CBC:   Recent Labs   Lab 12/17/23  0351   WBC 4.99   RBC 2.35*   HGB 7.3*   HCT 22.5*   *   MCV 96   MCH 31.1*   MCHC 32.4     Coagulation:   Recent Labs   Lab 12/15/23  0956 12/16/23  0254 12/17/23  0351   LABPROT 50.1*   < > 29.4*   INR 5.2*   < > 2.9*   APTT  39.3*  --   --     < > = values in this interval not displayed.       Significant Diagnostics:  None

## 2023-12-17 NOTE — ASSESSMENT & PLAN NOTE
Creatine stable for now. BMP reviewed- noted Estimated Creatinine Clearance: 20.3 mL/min (A) (based on SCr of 2.7 mg/dL (H)). according to latest data. 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   - BL creatinine 1.8-2.8. Continues to have good UOP, no electrolyte abnormalities.  - Monitor UOP and serial BMP and adjust therapy as needed.   - Renally dose meds.   - Avoid nephrotoxic medications and procedures.

## 2023-12-17 NOTE — ASSESSMENT & PLAN NOTE
Patient's anemia is currently controlled. Has received 1 units of PRBCs on 1 . Etiology likely d/t acute blood loss which was from epistaxis  Current CBC reviewed-   Lab Results   Component Value Date    HGB 7.3 (L) 12/17/2023    HCT 22.5 (L) 12/17/2023   - Transfuse to goal Hgb > 7  - Monitor serial CBC and transfuse if patient becomes hemodynamically unstable, symptomatic or H/H drops below 7/21.  - Daily INR's  - INR 2.9 today, goal INR 2-3; will touch base with pharmacy

## 2023-12-17 NOTE — PROGRESS NOTES
[facility] Medicine  Progress Note    Patient Name: Dariel Urbina  MRN: [id number]  Patient Class: OP- Observation   Admission Date: 12/15/2023  Length of Stay: 1 days  Attending Physician: Octavio Santos MD  Primary Care Provider: Devon Langston Jr., MD        Subjective:     Principal Problem:Recurrent epistaxis        HPI:  Dariel Urbina is an 83 year old male with a past medical history of CAD s/p CABG 1990's, mechanical AVR (on warfarin) c/b mod-severe MR w/ mitraclip, afib, GIB 2/2 diverticulosis s/p partial colon resection, T2DM (diet controlled), gout, HLD, HTN, CKD4, and recurrent epistaxis on warfarin that presents with recurrent nose bleed. He reports he has had 2 surgeries with ENT in the past. His has had on and off again nose bleeds for the past 2 ½ weeks and decided to come in today because he was feeling more dyspneic. Per chart review, cauterization of left septum was performed on 11/3/23 which initially helped.  He was seen in ENT clinic today and had nasal packing placed and sent to [facility] ED for admission and workup as the patient has been feeling short of breath.     He reports being compliant with his home warfarin which he takes 5mg nightly. INR here was supratherapeutic at 5.2. Reports last dose of warfarin was yesterday. He also reports swelling in his lower extremities for the past week which is new for him. He denies F/C, N/V, chest pain, abdominal pain, dysuria.     Upon arrival to the ED, patient afebrile, HR 60s, -160's/70's, saturating 100% on RA. Labs notable for Hgb 6.9, Plt count 144, INR 5.2 (on warfarin, goal 2-3), Cr 2.8 (BL 1.8-2.8), trop 0.055 -> 0.047, .    Overview/Hospital Course:  Patient admitted for recurrent epistaxis. Plan for cauterization 12/18 per ENT. INR downtrending with holding warfarin. Pharmacy following for warfarin titration. Patient required 1u pRBC transfusion on admission for acute blood loss anemia. Hgb remained stable  and had minimal nose bleeds. Plan for cauterization 12/18 with ENT    Interval History: Pt seen and examined this morning on rounds. NAEON. No nose bleeds. Hgb remains stable. Patient without complaints. INR 2.9 today. Plan for cauterization with ENT tomorrow. NPO midnight.       Objective:     Vital Signs (Most Recent):  Temp: 98 °F (36.7 °C) (12/17/23 0809)  Pulse: 62 (12/17/23 0944)  Resp: 18 (12/17/23 0944)  BP: 132/60 (12/17/23 0809)  SpO2: 98 % (12/17/23 0944) Vital Signs (24h Range):  Temp:  [97.4 °F (36.3 °C)-98 °F (36.7 °C)] 98 °F (36.7 °C)  Pulse:  [54-65] 62  Resp:  [16-18] 18  SpO2:  [94 %-98 %] 98 %  BP: (112-132)/(56-80) 132/60     Weight: 78 kg (172 lb)  Body mass index is 28.62 kg/m².    Intake/Output Summary (Last 24 hours) at 12/17/2023 0953  Last data filed at 12/17/2023 0610  Gross per 24 hour   Intake 625 ml   Output 900 ml   Net -275 ml      Physical Exam  Gen: in NAD, appears stated age  Neuro: AAOx4, CN2-12 grossly intact BL; motor, sensory, and strength grossly intact BL  HEENT: Dried blood around nares, packing in place, no active bleeding noted. NTNC, EOMI, PERRLA, MMM; no thyromegaly or lymphadenopathy; no JVD appreciated  CVS: Mechanical click noted, normal rate, S1/S2 auscultated with no S3 or S4; capillary refill < 2 sec  Resp: lungs CTAB, no w/r/r; no belabored breathing or accessory muscle use appreciated   Abd: BS+ in all 4 quadrants; NTND, soft to palpation; no organomegaly appreciated   Extrem: pulses full, equal, and regular over all 4 extremities; no UE or LE edema BL        MELD 3.0: 27 at 12/17/2023  3:51 AM  MELD-Na: 28 at 12/17/2023  3:51 AM  Calculated from:  Serum Creatinine: 2.7 mg/dL at 12/17/2023  3:51 AM  Serum Sodium: 142 mmol/L (Using max of 137 mmol/L) at 12/17/2023  3:51 AM  Total Bilirubin: 0.4 mg/dL (Using min of 1 mg/dL) at 12/17/2023  3:51 AM  Serum Albumin: 2.9 g/dL at 12/17/2023  3:51 AM  INR(ratio): 2.9 at 12/17/2023  3:51 AM  Age at listing (hypothetical):  83 years  Sex: Male at 12/17/2023  3:51 AM      Significant Labs:  CBC:  Recent Labs   Lab 12/15/23  0956 12/16/23  0254 12/17/23  0351   WBC 5.47 4.43 4.99   HGB 6.9* 7.3* 7.3*   HCT 22.0* 23.1* 22.5*   * 131* 129*     CMP:  Recent Labs   Lab 12/15/23  0956 12/16/23  0254 12/17/23  0351    138 142   K 4.6 4.1 4.4   * 110 109   CO2 20* 21* 21*   * 82 94   BUN 50* 47* 53*   CREATININE 2.8* 2.5* 2.7*   CALCIUM 9.7 9.5 9.4   PROT 6.7 6.0 6.0   ALBUMIN 3.2* 2.9* 2.9*   BILITOT 0.3 0.4 0.4   ALKPHOS 60 51* 69   AST 19 18 19   ALT 14 13 13   ANIONGAP 8 7* 12     PTINR:  Recent Labs   Lab 12/15/23  0956 12/16/23  0254 12/17/23  0351   INR 5.2* 4.2* 2.9*       Significant Procedures:   Dobutamine Stress Test with Color Flow: No results found. However, due to the size of the patient record, not all encounters were searched. Please check Results Review for a complete set of results.    Assessment/Plan:      * Recurrent epistaxis  Patient with history of recurrent epistaxis while on warfarin. Has had 2 cauterizations with ENT, last one in November of this year. Seen in ENT clinic today for continued bleeding for past 2.5 weeks. S/p left Merocel placed in ENT clinic on 12/15. Now presenting with acute blood loss anemia requiring RBC transfusion in setting of supra-therapeutic warfarin (INR 5.2)   - ENT consulted, appreciate recs  - Sodium chloride nasal spray BL Q4H while awake  - Oxymetazoline nasal spray to left PRN for epistaxis  - Augmentin BID while nasal packing in place  - Daily INR's, pharmacy to assist in dosing  - ENT to plan for OR Monday 12/18 for nasal endoscopy and cauterization of epistaxis    - INR 2.9 today, will touch base with pharmacy for warfarin dosing.    Acute blood loss anemia  Patient's anemia is currently controlled. Has received 1 units of PRBCs on 1 . Etiology likely d/t acute blood loss which was from epistaxis  Current CBC reviewed-   Lab Results   Component Value Date     HGB 7.3 (L) 12/17/2023    HCT 22.5 (L) 12/17/2023   - Transfuse to goal Hgb > 7  - Monitor serial CBC and transfuse if patient becomes hemodynamically unstable, symptomatic or H/H drops below 7/21.  - Daily INR's  - INR 2.9 today, goal INR 2-3; will touch base with pharmacy     Supratherapeutic INR  - INR 5.2 on admission  - Goal INR 2-3  - INR now 2.9  - Pharmacy consulted for warfarin dosing    Chronic combined systolic and diastolic heart failure  Chronic history, now appears to be recovered as most recent TTE 4/2023 with EF 60%, moderate MR. Saturating well on RA. Lung sounds clear. Notable for 1+ BL ALEXANDRA which is new for him. 's on admission  - S/p IV 40mg Lasix in ED  - Strict I/O's, daily volume assessments  - Continue home bumex, Imdur, metoprolol    CKD (chronic kidney disease), stage IV  Creatine stable for now. BMP reviewed- noted Estimated Creatinine Clearance: 20.3 mL/min (A) (based on SCr of 2.7 mg/dL (H)). according to latest data. 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   - BL creatinine 1.8-2.8. Continues to have good UOP, no electrolyte abnormalities.  - Monitor UOP and serial BMP and adjust therapy as needed.   - Renally dose meds.   - Avoid nephrotoxic medications and procedures.    Type 2 diabetes mellitus with diabetic peripheral angiopathy without gangrene  - History of T2DM, Latest A1c 6.1  - Diet controlled, not on home DM meds  - SSI here  - POCT glucose checks as ordered  - Hypoglycemic protocol in effect    Paroxysmal atrial fibrillation  - Currently in afib on EKG, stable rate  - Continue home regimen of metop for rate control  - Supra-therapeutic, holding warfarin  - Will continue to monitor on tele    Coronary artery disease involving native coronary artery of native heart without angina pectoris  History of CAD s/p CABG in 1990's  - Continue statin    History of gout  - continue allopurinol    Renovascular hypertension  - Working to optimize BP control  -  Continue home regimen of Imdur, metop  - Will continue to monitor and further titrate antihypertensives as clinically indicated     Hyperlipidemia associated with type 2 diabetes mellitus  - continue home statin        VTE Risk Mitigation (From admission, onward)           Ordered     IP VTE HIGH RISK PATIENT  Once         12/15/23 1342     Place sequential compression device  Until discontinued         12/15/23 1342                    Discharge Planning   ASTER: 12/19/2023     Code Status: Full Code   Is the patient medically ready for discharge?: No    Reason for patient still in hospital (select all that apply): Treatment  Discharge Plan A: Home with family                  Octavio Santos MD  Department of Hospital Medicine   [facility] Surg

## 2023-12-17 NOTE — PLAN OF CARE
Fresh blood noted on dressing. Dressing was not fully saturated, no major concerns from med team about that. No changes on shift.

## 2023-12-17 NOTE — ASSESSMENT & PLAN NOTE
Patient with history of recurrent epistaxis while on warfarin. Has had 2 cauterizations with ENT, last one in November of this year. Seen in ENT clinic today for continued bleeding for past 2.5 weeks. S/p left Merocel placed in ENT clinic on 12/15. Now presenting with acute blood loss anemia requiring RBC transfusion in setting of supra-therapeutic warfarin (INR 5.2)   - ENT consulted, appreciate recs  - Sodium chloride nasal spray BL Q4H while awake  - Oxymetazoline nasal spray to left PRN for epistaxis  - Augmentin BID while nasal packing in place  - Daily INR's, pharmacy to assist in dosing  - ENT to plan for OR Monday 12/18 for nasal endoscopy and cauterization of epistaxis    - INR 2.9 today, will touch base with pharmacy for warfarin dosing.

## 2023-12-18 ENCOUNTER — ANESTHESIA (OUTPATIENT)
Dept: SURGERY | Facility: HOSPITAL | Age: 83
DRG: 151 | End: 2023-12-18
Payer: MEDICARE

## 2023-12-18 LAB
ALBUMIN SERPL BCP-MCNC: 3.1 G/DL (ref 3.5–5.2)
ALP SERPL-CCNC: 81 U/L (ref 55–135)
ALT SERPL W/O P-5'-P-CCNC: 15 U/L (ref 10–44)
ANION GAP SERPL CALC-SCNC: 8 MMOL/L (ref 8–16)
AST SERPL-CCNC: 17 U/L (ref 10–40)
BASOPHILS # BLD AUTO: 0.03 K/UL (ref 0–0.2)
BASOPHILS NFR BLD: 0.7 % (ref 0–1.9)
BILIRUB SERPL-MCNC: 0.4 MG/DL (ref 0.1–1)
BUN SERPL-MCNC: 52 MG/DL (ref 8–23)
CALCIUM SERPL-MCNC: 9.6 MG/DL (ref 8.7–10.5)
CHLORIDE SERPL-SCNC: 110 MMOL/L (ref 95–110)
CO2 SERPL-SCNC: 20 MMOL/L (ref 23–29)
CREAT SERPL-MCNC: 2.4 MG/DL (ref 0.5–1.4)
DIFFERENTIAL METHOD: ABNORMAL
EOSINOPHIL # BLD AUTO: 0.2 K/UL (ref 0–0.5)
EOSINOPHIL NFR BLD: 5 % (ref 0–8)
ERYTHROCYTE [DISTWIDTH] IN BLOOD BY AUTOMATED COUNT: 14.8 % (ref 11.5–14.5)
EST. GFR  (NO RACE VARIABLE): 26.3 ML/MIN/1.73 M^2
GLUCOSE SERPL-MCNC: 93 MG/DL (ref 70–110)
HCT VFR BLD AUTO: 25.2 % (ref 40–54)
HGB BLD-MCNC: 7.8 G/DL (ref 14–18)
IMM GRANULOCYTES # BLD AUTO: 0.01 K/UL (ref 0–0.04)
IMM GRANULOCYTES NFR BLD AUTO: 0.2 % (ref 0–0.5)
INR PPP: 1.9 (ref 0.8–1.2)
LYMPHOCYTES # BLD AUTO: 1.2 K/UL (ref 1–4.8)
LYMPHOCYTES NFR BLD: 26.2 % (ref 18–48)
MAGNESIUM SERPL-MCNC: 2.1 MG/DL (ref 1.6–2.6)
MCH RBC QN AUTO: 31.1 PG (ref 27–31)
MCHC RBC AUTO-ENTMCNC: 31 G/DL (ref 32–36)
MCV RBC AUTO: 100 FL (ref 82–98)
MONOCYTES # BLD AUTO: 0.5 K/UL (ref 0.3–1)
MONOCYTES NFR BLD: 12 % (ref 4–15)
NEUTROPHILS # BLD AUTO: 2.5 K/UL (ref 1.8–7.7)
NEUTROPHILS NFR BLD: 55.9 % (ref 38–73)
NRBC BLD-RTO: 0 /100 WBC
PHOSPHATE SERPL-MCNC: 4 MG/DL (ref 2.7–4.5)
PLATELET # BLD AUTO: 147 K/UL (ref 150–450)
PMV BLD AUTO: 11.3 FL (ref 9.2–12.9)
POTASSIUM SERPL-SCNC: 4.1 MMOL/L (ref 3.5–5.1)
PROT SERPL-MCNC: 6.5 G/DL (ref 6–8.4)
PROTHROMBIN TIME: 19.6 SEC (ref 9–12.5)
RBC # BLD AUTO: 2.51 M/UL (ref 4.6–6.2)
SODIUM SERPL-SCNC: 138 MMOL/L (ref 136–145)
WBC # BLD AUTO: 4.42 K/UL (ref 3.9–12.7)

## 2023-12-18 PROCEDURE — 25000003 PHARM REV CODE 250: Mod: HCNC | Performed by: STUDENT IN AN ORGANIZED HEALTH CARE EDUCATION/TRAINING PROGRAM

## 2023-12-18 PROCEDURE — D9220A PRA ANESTHESIA: ICD-10-PCS | Mod: HCNC,CRNA,, | Performed by: NURSE ANESTHETIST, CERTIFIED REGISTERED

## 2023-12-18 PROCEDURE — 25000003 PHARM REV CODE 250: Mod: HCNC | Performed by: INTERNAL MEDICINE

## 2023-12-18 PROCEDURE — 37000008 HC ANESTHESIA 1ST 15 MINUTES: Mod: HCNC | Performed by: STUDENT IN AN ORGANIZED HEALTH CARE EDUCATION/TRAINING PROGRAM

## 2023-12-18 PROCEDURE — 87076 CULTURE ANAEROBE IDENT EACH: CPT | Mod: HCNC | Performed by: STUDENT IN AN ORGANIZED HEALTH CARE EDUCATION/TRAINING PROGRAM

## 2023-12-18 PROCEDURE — 63600175 PHARM REV CODE 636 W HCPCS: Mod: HCNC

## 2023-12-18 PROCEDURE — 21400001 HC TELEMETRY ROOM: Mod: HCNC

## 2023-12-18 PROCEDURE — 85610 PROTHROMBIN TIME: CPT | Mod: HCNC | Performed by: INTERNAL MEDICINE

## 2023-12-18 PROCEDURE — 87186 SC STD MICRODIL/AGAR DIL: CPT | Mod: HCNC | Performed by: STUDENT IN AN ORGANIZED HEALTH CARE EDUCATION/TRAINING PROGRAM

## 2023-12-18 PROCEDURE — 84100 ASSAY OF PHOSPHORUS: CPT | Mod: HCNC | Performed by: INTERNAL MEDICINE

## 2023-12-18 PROCEDURE — D9220A PRA ANESTHESIA: Mod: HCNC,CRNA,, | Performed by: NURSE ANESTHETIST, CERTIFIED REGISTERED

## 2023-12-18 PROCEDURE — 11000001 HC ACUTE MED/SURG PRIVATE ROOM: Mod: HCNC

## 2023-12-18 PROCEDURE — 71000016 HC POSTOP RECOV ADDL HR: Mod: HCNC | Performed by: STUDENT IN AN ORGANIZED HEALTH CARE EDUCATION/TRAINING PROGRAM

## 2023-12-18 PROCEDURE — 71000033 HC RECOVERY, INTIAL HOUR: Mod: HCNC | Performed by: STUDENT IN AN ORGANIZED HEALTH CARE EDUCATION/TRAINING PROGRAM

## 2023-12-18 PROCEDURE — 94761 N-INVAS EAR/PLS OXIMETRY MLT: CPT | Mod: HCNC

## 2023-12-18 PROCEDURE — 80053 COMPREHEN METABOLIC PANEL: CPT | Mod: HCNC | Performed by: INTERNAL MEDICINE

## 2023-12-18 PROCEDURE — D9220A PRA ANESTHESIA: Mod: HCNC,ANES,, | Performed by: ANESTHESIOLOGY

## 2023-12-18 PROCEDURE — 36000707: Mod: HCNC | Performed by: STUDENT IN AN ORGANIZED HEALTH CARE EDUCATION/TRAINING PROGRAM

## 2023-12-18 PROCEDURE — 37000009 HC ANESTHESIA EA ADD 15 MINS: Mod: HCNC | Performed by: STUDENT IN AN ORGANIZED HEALTH CARE EDUCATION/TRAINING PROGRAM

## 2023-12-18 PROCEDURE — 25000003 PHARM REV CODE 250: Mod: HCNC | Performed by: NURSE ANESTHETIST, CERTIFIED REGISTERED

## 2023-12-18 PROCEDURE — 71000015 HC POSTOP RECOV 1ST HR: Mod: HCNC | Performed by: STUDENT IN AN ORGANIZED HEALTH CARE EDUCATION/TRAINING PROGRAM

## 2023-12-18 PROCEDURE — 87075 CULTR BACTERIA EXCEPT BLOOD: CPT | Mod: HCNC | Performed by: STUDENT IN AN ORGANIZED HEALTH CARE EDUCATION/TRAINING PROGRAM

## 2023-12-18 PROCEDURE — 36000706: Mod: HCNC | Performed by: STUDENT IN AN ORGANIZED HEALTH CARE EDUCATION/TRAINING PROGRAM

## 2023-12-18 PROCEDURE — 27201423 OPTIME MED/SURG SUP & DEVICES STERILE SUPPLY: Mod: HCNC | Performed by: STUDENT IN AN ORGANIZED HEALTH CARE EDUCATION/TRAINING PROGRAM

## 2023-12-18 PROCEDURE — 27000221 HC OXYGEN, UP TO 24 HOURS: Mod: HCNC

## 2023-12-18 PROCEDURE — 85025 COMPLETE CBC W/AUTO DIFF WBC: CPT | Mod: HCNC | Performed by: INTERNAL MEDICINE

## 2023-12-18 PROCEDURE — 99900035 HC TECH TIME PER 15 MIN (STAT): Mod: HCNC

## 2023-12-18 PROCEDURE — D9220A PRA ANESTHESIA: ICD-10-PCS | Mod: HCNC,ANES,, | Performed by: ANESTHESIOLOGY

## 2023-12-18 PROCEDURE — 36415 COLL VENOUS BLD VENIPUNCTURE: CPT | Mod: HCNC | Performed by: INTERNAL MEDICINE

## 2023-12-18 PROCEDURE — 83735 ASSAY OF MAGNESIUM: CPT | Mod: HCNC | Performed by: INTERNAL MEDICINE

## 2023-12-18 PROCEDURE — 87077 CULTURE AEROBIC IDENTIFY: CPT | Mod: HCNC | Performed by: STUDENT IN AN ORGANIZED HEALTH CARE EDUCATION/TRAINING PROGRAM

## 2023-12-18 PROCEDURE — 63600175 PHARM REV CODE 636 W HCPCS: Mod: HCNC | Performed by: NURSE ANESTHETIST, CERTIFIED REGISTERED

## 2023-12-18 PROCEDURE — 87070 CULTURE OTHR SPECIMN AEROBIC: CPT | Mod: HCNC | Performed by: STUDENT IN AN ORGANIZED HEALTH CARE EDUCATION/TRAINING PROGRAM

## 2023-12-18 RX ORDER — SUCCINYLCHOLINE CHLORIDE 20 MG/ML
INJECTION INTRAMUSCULAR; INTRAVENOUS
Status: DISCONTINUED | OUTPATIENT
Start: 2023-12-18 | End: 2023-12-18

## 2023-12-18 RX ORDER — PROCHLORPERAZINE EDISYLATE 5 MG/ML
5 INJECTION INTRAMUSCULAR; INTRAVENOUS EVERY 30 MIN PRN
Status: DISCONTINUED | OUTPATIENT
Start: 2023-12-18 | End: 2023-12-18 | Stop reason: HOSPADM

## 2023-12-18 RX ORDER — HYDROMORPHONE HYDROCHLORIDE 1 MG/ML
0.2 INJECTION, SOLUTION INTRAMUSCULAR; INTRAVENOUS; SUBCUTANEOUS EVERY 5 MIN PRN
Status: DISCONTINUED | OUTPATIENT
Start: 2023-12-18 | End: 2023-12-18 | Stop reason: HOSPADM

## 2023-12-18 RX ORDER — FENTANYL CITRATE 50 UG/ML
25 INJECTION, SOLUTION INTRAMUSCULAR; INTRAVENOUS EVERY 5 MIN PRN
Status: DISCONTINUED | OUTPATIENT
Start: 2023-12-18 | End: 2023-12-18 | Stop reason: HOSPADM

## 2023-12-18 RX ORDER — ROCURONIUM BROMIDE 10 MG/ML
INJECTION, SOLUTION INTRAVENOUS
Status: DISCONTINUED | OUTPATIENT
Start: 2023-12-18 | End: 2023-12-18

## 2023-12-18 RX ORDER — ONDANSETRON 2 MG/ML
INJECTION INTRAMUSCULAR; INTRAVENOUS
Status: DISCONTINUED | OUTPATIENT
Start: 2023-12-18 | End: 2023-12-18

## 2023-12-18 RX ORDER — DEXAMETHASONE SODIUM PHOSPHATE 4 MG/ML
INJECTION, SOLUTION INTRA-ARTICULAR; INTRALESIONAL; INTRAMUSCULAR; INTRAVENOUS; SOFT TISSUE
Status: DISCONTINUED | OUTPATIENT
Start: 2023-12-18 | End: 2023-12-18

## 2023-12-18 RX ORDER — PHENYLEPHRINE HCL IN 0.9% NACL 1 MG/10 ML
SYRINGE (ML) INTRAVENOUS
Status: DISCONTINUED | OUTPATIENT
Start: 2023-12-18 | End: 2023-12-18

## 2023-12-18 RX ORDER — LABETALOL HCL 20 MG/4 ML
10 SYRINGE (ML) INTRAVENOUS
Status: DISCONTINUED | OUTPATIENT
Start: 2023-12-18 | End: 2023-12-19 | Stop reason: HOSPADM

## 2023-12-18 RX ORDER — SODIUM CHLORIDE 0.9 % (FLUSH) 0.9 %
3 SYRINGE (ML) INJECTION
Status: DISCONTINUED | OUTPATIENT
Start: 2023-12-18 | End: 2023-12-18 | Stop reason: HOSPADM

## 2023-12-18 RX ORDER — DOXYCYCLINE HYCLATE 100 MG
100 TABLET ORAL EVERY 12 HOURS
Status: DISCONTINUED | OUTPATIENT
Start: 2023-12-18 | End: 2023-12-19 | Stop reason: HOSPADM

## 2023-12-18 RX ORDER — PROPOFOL 10 MG/ML
VIAL (ML) INTRAVENOUS
Status: DISCONTINUED | OUTPATIENT
Start: 2023-12-18 | End: 2023-12-18

## 2023-12-18 RX ORDER — WARFARIN 2.5 MG/1
2.5 TABLET ORAL DAILY
Status: DISCONTINUED | OUTPATIENT
Start: 2023-12-18 | End: 2023-12-19

## 2023-12-18 RX ORDER — HALOPERIDOL 5 MG/ML
0.5 INJECTION INTRAMUSCULAR EVERY 10 MIN PRN
Status: DISCONTINUED | OUTPATIENT
Start: 2023-12-18 | End: 2023-12-18 | Stop reason: HOSPADM

## 2023-12-18 RX ORDER — FENTANYL CITRATE 50 UG/ML
INJECTION, SOLUTION INTRAMUSCULAR; INTRAVENOUS
Status: DISCONTINUED | OUTPATIENT
Start: 2023-12-18 | End: 2023-12-18

## 2023-12-18 RX ORDER — LABETALOL HCL 20 MG/4 ML
SYRINGE (ML) INTRAVENOUS
Status: COMPLETED
Start: 2023-12-18 | End: 2023-12-18

## 2023-12-18 RX ORDER — OXYMETAZOLINE HCL 0.05 %
SPRAY, NON-AEROSOL (ML) NASAL
Status: DISCONTINUED | OUTPATIENT
Start: 2023-12-18 | End: 2023-12-18 | Stop reason: HOSPADM

## 2023-12-18 RX ORDER — DIPHENHYDRAMINE HYDROCHLORIDE 50 MG/ML
25 INJECTION INTRAMUSCULAR; INTRAVENOUS EVERY 6 HOURS PRN
Status: DISCONTINUED | OUTPATIENT
Start: 2023-12-18 | End: 2023-12-18 | Stop reason: HOSPADM

## 2023-12-18 RX ORDER — LIDOCAINE HYDROCHLORIDE 20 MG/ML
INJECTION INTRAVENOUS
Status: DISCONTINUED | OUTPATIENT
Start: 2023-12-18 | End: 2023-12-18

## 2023-12-18 RX ADMIN — NASAL 1 SPRAY: 6.5 SPRAY NASAL at 06:12

## 2023-12-18 RX ADMIN — ONDANSETRON 4 MG: 2 INJECTION INTRAMUSCULAR; INTRAVENOUS at 03:12

## 2023-12-18 RX ADMIN — DEXAMETHASONE SODIUM PHOSPHATE 4 MG: 4 INJECTION, SOLUTION INTRAMUSCULAR; INTRAVENOUS at 03:12

## 2023-12-18 RX ADMIN — NASAL 1 SPRAY: 6.5 SPRAY NASAL at 10:12

## 2023-12-18 RX ADMIN — LABETALOL HYDROCHLORIDE 10 MG: 5 INJECTION, SOLUTION INTRAVENOUS at 05:12

## 2023-12-18 RX ADMIN — Medication 100 MCG: at 04:12

## 2023-12-18 RX ADMIN — ISOSORBIDE MONONITRATE 60 MG: 60 TABLET, EXTENDED RELEASE ORAL at 08:12

## 2023-12-18 RX ADMIN — TRAZODONE HYDROCHLORIDE 50 MG: 50 TABLET ORAL at 08:12

## 2023-12-18 RX ADMIN — DOXYCYCLINE HYCLATE 100 MG: 100 TABLET, FILM COATED ORAL at 09:12

## 2023-12-18 RX ADMIN — LIDOCAINE HYDROCHLORIDE 100 MG: 20 INJECTION INTRAVENOUS at 03:12

## 2023-12-18 RX ADMIN — PROPOFOL 150 MG: 10 INJECTION, EMULSION INTRAVENOUS at 03:12

## 2023-12-18 RX ADMIN — SODIUM CHLORIDE: 0.9 INJECTION, SOLUTION INTRAVENOUS at 03:12

## 2023-12-18 RX ADMIN — SUCCINYLCHOLINE CHLORIDE 140 MG: 20 INJECTION, SOLUTION INTRAMUSCULAR; INTRAVENOUS at 03:12

## 2023-12-18 RX ADMIN — Medication 200 MCG: at 04:12

## 2023-12-18 RX ADMIN — ROCURONIUM BROMIDE 5 MG: 10 INJECTION, SOLUTION INTRAVENOUS at 03:12

## 2023-12-18 RX ADMIN — METOPROLOL SUCCINATE 25 MG: 25 TABLET, EXTENDED RELEASE ORAL at 12:12

## 2023-12-18 RX ADMIN — FENTANYL CITRATE 100 MCG: 50 INJECTION, SOLUTION INTRAMUSCULAR; INTRAVENOUS at 03:12

## 2023-12-18 RX ADMIN — Medication 10 MG: at 05:12

## 2023-12-18 RX ADMIN — Medication 1 CAPSULE: at 09:12

## 2023-12-18 RX ADMIN — ATORVASTATIN CALCIUM 80 MG: 40 TABLET, FILM COATED ORAL at 08:12

## 2023-12-18 NOTE — SUBJECTIVE & OBJECTIVE
Interval History: NAEON. No further bleeding episodes. INR 1.9 today.     Medications:  Continuous Infusions:  Scheduled Meds:   allopurinoL  100 mg Oral Daily    amoxicillin-clavulanate 500-125mg  1 tablet Oral BID    atorvastatin  80 mg Oral QHS    bumetanide  1 mg Oral Every other day    ferrous gluconate  324 mg Oral Daily with breakfast    isosorbide mononitrate  60 mg Oral QHS    metoprolol succinate  25 mg Oral Daily    omega 3-dha-epa-fish oil  1 capsule Oral QHS    sodium chloride  1 spray Each Nostril Q4H While awake     PRN Meds:0.9%  NaCl infusion (for blood administration), acetaminophen, aluminum-magnesium hydroxide-simethicone, dextrose 10%, dextrose 10%, dextrose, dextrose, glucagon (human recombinant), melatonin, naloxone, ondansetron, oxymetazoline, polyethylene glycol, prochlorperazine, sodium chloride 0.9%, sodium chloride 0.9%, traZODone     Review of patient's allergies indicates:   Allergen Reactions    Lisinopril     Losartan      Intolerance- elevates potassium level     Objective:     Vital Signs (24h Range):  Temp:  [97 °F (36.1 °C)-99.1 °F (37.3 °C)] 98.5 °F (36.9 °C)  Pulse:  [52-60] 60  Resp:  [12-18] 12  SpO2:  [92 %-97 %] 97 %  BP: (103-145)/(51-78) 133/63       Lines/Drains/Airways       Peripheral Intravenous Line  Duration                  Peripheral IV - Single Lumen 12/15/23 1123 20 G Anterior;Right Forearm 2 days         Peripheral IV - Single Lumen 12/17/23 1658 20 G;1 3/4 in Anterior;Left Forearm <1 day                   Physical Exam  Sitting on side of bed  Mustache dressing in place  Left merocel in place, taped to L cheek   Oropharynx patent  No blood pooling or streaking in the OP   Breathing comfortably     Significant Labs:  BMP:   Recent Labs   Lab 12/18/23  0543   GLU 93      CO2 20*   BUN 52*   CREATININE 2.4*   CALCIUM 9.6   MG 2.1     CBC:   Recent Labs   Lab 12/18/23  0543   WBC 4.42   RBC 2.51*   HGB 7.8*   HCT 25.2*   *   *   MCH 31.1*   MCHC  31.0*     Coagulation:   Recent Labs   Lab 12/15/23  0956 12/16/23  0254 12/18/23  0543   LABPROT 50.1*   < > 19.6*   INR 5.2*   < > 1.9*   APTT 39.3*  --   --     < > = values in this interval not displayed.     Significant Diagnostics:  None

## 2023-12-18 NOTE — ASSESSMENT & PLAN NOTE
83M with pAF, mechanical aortic valve (on warfarin), and recurrent epistaxis s/p multiple procedures. Left Merocel placed in ENT clinic on 12/15. Presented to ED for anemia in CKD setting. Anticipation of OR on 12/18.     -- Sodium chloride nasal spray bilaterally Q4H while awake  -- Oxymetazoline nasal spray on the left PRN for epistaxis  -- Augmentin BID while nasal packing in place  -- To OR today for nasal endoscopy and cauterization of epistaxis  -- Page ENT with questions or concerns

## 2023-12-18 NOTE — PLAN OF CARE
Chart reviewed. Preop nursing care completed per orders. Safe surgery checklist completed Wife at bedside. Call bell within reach. Instructed pt to call for assistance.

## 2023-12-18 NOTE — PROGRESS NOTES
[facility] Surg  Otorhinolaryngology-Head & Neck Surgery  Progress Note    Subjective:     Post-Op Info:  Procedure(s) (LRB):  CAUTERIZATION, NOSE, ENDOSCOPIC (N/A)      Hospital Day: 4     Interval History: NAEON. No further bleeding episodes. INR 1.9 today.     Medications:  Continuous Infusions:  Scheduled Meds:   allopurinoL  100 mg Oral Daily    amoxicillin-clavulanate 500-125mg  1 tablet Oral BID    atorvastatin  80 mg Oral QHS    bumetanide  1 mg Oral Every other day    ferrous gluconate  324 mg Oral Daily with breakfast    isosorbide mononitrate  60 mg Oral QHS    metoprolol succinate  25 mg Oral Daily    omega 3-dha-epa-fish oil  1 capsule Oral QHS    sodium chloride  1 spray Each Nostril Q4H While awake     PRN Meds:0.9%  NaCl infusion (for blood administration), acetaminophen, aluminum-magnesium hydroxide-simethicone, dextrose 10%, dextrose 10%, dextrose, dextrose, glucagon (human recombinant), melatonin, naloxone, ondansetron, oxymetazoline, polyethylene glycol, prochlorperazine, sodium chloride 0.9%, sodium chloride 0.9%, traZODone     Review of patient's allergies indicates:   Allergen Reactions    Lisinopril     Losartan      Intolerance- elevates potassium level     Objective:     Vital Signs (24h Range):  Temp:  [97 °F (36.1 °C)-99.1 °F (37.3 °C)] 98.5 °F (36.9 °C)  Pulse:  [52-60] 60  Resp:  [12-18] 12  SpO2:  [92 %-97 %] 97 %  BP: (103-145)/(51-78) 133/63       Lines/Drains/Airways       Peripheral Intravenous Line  Duration                  Peripheral IV - Single Lumen 12/15/23 1123 20 G Anterior;Right Forearm 2 days         Peripheral IV - Single Lumen 12/17/23 1658 20 G;1 3/4 in Anterior;Left Forearm <1 day                   Physical Exam  Sitting on side of bed  Mustache dressing in place  Left merocel in place, taped to L cheek   Oropharynx patent  No blood pooling or streaking in the OP   Breathing comfortably     Significant Labs:  BMP:   Recent Labs   Lab 12/18/23  0543   GLU 93   CL  110   CO2 20*   BUN 52*   CREATININE 2.4*   CALCIUM 9.6   MG 2.1     CBC:   Recent Labs   Lab 12/18/23  0543   WBC 4.42   RBC 2.51*   HGB 7.8*   HCT 25.2*   *   *   MCH 31.1*   MCHC 31.0*     Coagulation:   Recent Labs   Lab 12/15/23  0956 12/16/23  0254 12/18/23  0543   LABPROT 50.1*   < > 19.6*   INR 5.2*   < > 1.9*   APTT 39.3*  --   --     < > = values in this interval not displayed.     Significant Diagnostics:  None  Assessment/Plan:     * Recurrent epistaxis  83M with pAF, mechanical aortic valve (on warfarin), and recurrent epistaxis s/p multiple procedures. Left Merocel placed in ENT clinic on 12/15. Presented to ED for anemia in CKD setting. Anticipation of OR on 12/18.     -- Sodium chloride nasal spray bilaterally Q4H while awake  -- Oxymetazoline nasal spray on the left PRN for epistaxis  -- Augmentin BID while nasal packing in place  -- To OR today for nasal endoscopy and cauterization of epistaxis  -- Page ENT with questions or concerns         Tank Gomez MD  Otorhinolaryngology-Head & Neck Surgery  [facility] - Med Surg

## 2023-12-18 NOTE — ANESTHESIA POSTPROCEDURE EVALUATION
Anesthesia Post Evaluation    Patient: Dariel Urbina    Procedure(s) Performed: Procedure(s) (LRB):  CAUTERIZATION, NOSE, ENDOSCOPIC (N/A)    Final Anesthesia Type: general      Patient location during evaluation: PACU  Patient participation: Yes- Able to Participate  Level of consciousness: awake and alert  Post-procedure vital signs: reviewed and stable  Pain management: adequate  Airway patency: patent    PONV status at discharge: No PONV  Anesthetic complications: no      Cardiovascular status: blood pressure returned to baseline  Respiratory status: unassisted  Follow-up not needed.              Vitals Value Taken Time   /67 12/18/23 1729   Temp 36.5 °C (97.7 °F) 12/18/23 1641   Pulse 55 12/18/23 1742   Resp 9 12/18/23 1742   SpO2 96 % 12/18/23 1742   Vitals shown include unvalidated device data.      Event Time   Out of Recovery 12/18/2023 17:02:00         Pain/Sanjeev Score: Sanjeev Score: 10 (12/18/2023  5:02 PM)

## 2023-12-18 NOTE — CONSULTS
PHARMACY CONSULT NOTE: WARFARIN    Dariel Urbina is a 83 y.o. male on warfarin therapy for Mechanical Heart Valve. PharmD has been consulted for warfarin dosing.    Current order: Hold Warfarin  Home dose: Warfarin 5 mg daily  Coumadin clinic enrollment: Active  INR goal: 2-3    Lab Results   Component Value Date    INR 1.9 (H) 12/18/2023    INR 2.9 (H) 12/17/2023    INR 4.2 (H) 12/16/2023    PROTIME 26.6 (H) 10/29/2023    PROTIME 28.4 (H) 09/30/2023       Recommendation(s):   INR trending down 4.2 > 2.9 > 1.9  Will restart Warfarin 2.5 mg today. Plan discussed with team  Daily INR  Pharmacy will continue to follow and monitor warfarin    Thank you for the consult,  Linda Zavala PharmD  Extension - [phone]    **Note: Consults are reviewed Monday-Friday 7:00am-3:30pm. THE ABOVE RECOMMENDATIONS ARE ONLY SUGGESTED.THE RECOMMENDATIONS SHOULD BE CONSIDERED IN CONJUNCTION WITH ALL PATIENT FACTORS. **

## 2023-12-18 NOTE — BRIEF OP NOTE
Post Op Instructions    -- Okay to resume diet  -- Avoid nose blowing, sneeze with mouth open, for 3 weeks  -- Discontinue Augmentin; start doxycycline BID and continue for 10d while outpatient  -- Okay to discharge in the morning pending resolution of elevated INR  -- Please Secure Chat me for any questions, page ENT on-call if unresponsive or urgent    [facility] - Surgery (2nd Fl)  Brief Operative Note    Surgery Date: 12/18/2023     Surgeon(s) and Role:     * Jono Russell MD - Primary     * Tank Gomez MD - Resident - Assisting    Pre-op Diagnosis:  Recurrent epistaxis [R04.0]    Post-op Diagnosis:  Post-Op Diagnosis Codes:     * Recurrent epistaxis [R04.0]    Procedure(s) (LRB):  CAUTERIZATION, NOSE, ENDOSCOPIC (N/A)    Anesthesia: General    Operative Findings: endoscopic cauterization of nasal epistaxis, purulence noted from right posterior ethmoid region, cultured; dissolvable nasal packing placed bilaterally    Estimated Blood Loss: 10 ml         Specimens:   Specimen (24h ago, onward)      None

## 2023-12-18 NOTE — PLAN OF CARE
APPOINTMENT:    Patient Appointment(s) scheduled with Fredis High MD  Friday Dec 22, 2023 1:15 PM

## 2023-12-18 NOTE — PLAN OF CARE
CHW met with patient/family at bedside. Patient experience rounding completed and reviewed the following.     Do you know your discharge plan? Yes Home    Have you discussed your needs and preferences with your SW/CM? Yes    If you are discharging home, do you have help at home? Yes     Do you think you will need help additional at home at discharge? Yes    Do you currently have difficulty keeping up with bills, affording medicine or buying food?  No    Assigned SW/CM notified of any patient/family needs or concerns. Appropriate resources provided to address patient's needs.

## 2023-12-18 NOTE — PROGRESS NOTES
Patient has been having PVCs after arriving to PACU and has Atrial Fibrillation and Trigeminy, informed Dr Pacheco, says patient has history of the above hence no interventions needed at this time.

## 2023-12-18 NOTE — TRANSFER OF CARE
"Anesthesia Transfer of Care Note    Patient: Dariel Urbina    Procedure(s) Performed: Procedure(s) (LRB):  CAUTERIZATION, NOSE, ENDOSCOPIC (N/A)    Patient location: PACU    Anesthesia Type: general    Transport from OR: Transported from OR on 6-10 L/min O2 by face mask with adequate spontaneous ventilation    Post pain: adequate analgesia    Post assessment: no apparent anesthetic complications and tolerated procedure well    Post vital signs: stable    Level of consciousness: sedated    Nausea/Vomiting: no nausea/vomiting    Complications: none    Transfer of care protocol was followed      Last vitals: Visit Vitals  BP (!) 140/91 (BP Location: Left arm, Patient Position: Lying)   Pulse 61   Temp 36.8 °C (98.2 °F) (Temporal)   Resp 18   Ht 5' 5" (1.651 m)   Wt 78 kg (172 lb)   SpO2 98%   BMI 28.62 kg/m²     "

## 2023-12-18 NOTE — ANESTHESIA PROCEDURE NOTES
Intubation    Date/Time: 12/18/2023 3:50 PM    Performed by: Devon Driscoll Jr., CRNA  Authorized by: Nellie Cloud MD    Intubation:     Induction:  Intravenous    Intubated:  Postinduction    Mask Ventilation:  N/a    Attempts:  1    Attempted By:  CRNA    Method of Intubation:  Video laryngoscopy    Blade:  Osorio 3    Laryngeal View Grade: Grade I - full view of cords      Difficult Airway Encountered?: No      Complications:  None    Airway Device:  Oral endotracheal tube    Airway Device Size:  7.5    Style/Cuff Inflation:  Cuffed (inflated to minimal occlusive pressure)    Tube secured:  20    Secured at:  The teeth    Placement Verified By:  Capnometry    Complicating Factors:  None    Findings Post-Intubation:  BS equal bilateral and atraumatic/condition of teeth unchanged

## 2023-12-18 NOTE — NURSING TRANSFER
Nursing Transfer Note      12/18/2023   5:57 PM    Nurse giving handoff:Deanna RN PACU    Nurse receiving handoff:Ingris MSU    Reason patient is being transferred: Post procedure    Transfer To: Room 648    Transfer via stretcher    Transfer with cardiac monitoring    Transported by PCT Juan Carlos    Telemetry: Box Number [id number], Rate 54, Rhythm AF with PVCs, and Telemetry  Kim[place]ley    Order for Tele Monitor? Yes    4eyes on Skin: no    Medicines sent: None    Any special needs or follow-up needed: Routine    Patient belongings transferred with patient:  None    Chart send with patient: Yes    Notified: spouse    Patient reassessed at: 12/18/2023  1800(date, time)

## 2023-12-19 VITALS
BODY MASS INDEX: 28.66 KG/M2 | OXYGEN SATURATION: 98 % | TEMPERATURE: 97 F | WEIGHT: 172 LBS | DIASTOLIC BLOOD PRESSURE: 69 MMHG | HEART RATE: 71 BPM | HEIGHT: 65 IN | SYSTOLIC BLOOD PRESSURE: 133 MMHG | RESPIRATION RATE: 18 BRPM

## 2023-12-19 LAB
ALBUMIN SERPL BCP-MCNC: 3 G/DL (ref 3.5–5.2)
ALP SERPL-CCNC: 58 U/L (ref 55–135)
ALT SERPL W/O P-5'-P-CCNC: 14 U/L (ref 10–44)
ANION GAP SERPL CALC-SCNC: 13 MMOL/L (ref 8–16)
AST SERPL-CCNC: 15 U/L (ref 10–40)
BASOPHILS # BLD AUTO: 0.01 K/UL (ref 0–0.2)
BASOPHILS NFR BLD: 0.3 % (ref 0–1.9)
BILIRUB SERPL-MCNC: 0.4 MG/DL (ref 0.1–1)
BUN SERPL-MCNC: 50 MG/DL (ref 8–23)
CALCIUM SERPL-MCNC: 9.6 MG/DL (ref 8.7–10.5)
CHLORIDE SERPL-SCNC: 109 MMOL/L (ref 95–110)
CO2 SERPL-SCNC: 20 MMOL/L (ref 23–29)
CREAT SERPL-MCNC: 2.4 MG/DL (ref 0.5–1.4)
DIFFERENTIAL METHOD: ABNORMAL
EOSINOPHIL # BLD AUTO: 0 K/UL (ref 0–0.5)
EOSINOPHIL NFR BLD: 0 % (ref 0–8)
ERYTHROCYTE [DISTWIDTH] IN BLOOD BY AUTOMATED COUNT: 14.7 % (ref 11.5–14.5)
EST. GFR  (NO RACE VARIABLE): 26.3 ML/MIN/1.73 M^2
GLUCOSE SERPL-MCNC: 244 MG/DL (ref 70–110)
HCT VFR BLD AUTO: 23.1 % (ref 40–54)
HGB BLD-MCNC: 7.4 G/DL (ref 14–18)
IMM GRANULOCYTES # BLD AUTO: 0.03 K/UL (ref 0–0.04)
IMM GRANULOCYTES NFR BLD AUTO: 0.8 % (ref 0–0.5)
INR PPP: 1.7 (ref 0.8–1.2)
LYMPHOCYTES # BLD AUTO: 0.4 K/UL (ref 1–4.8)
LYMPHOCYTES NFR BLD: 11.1 % (ref 18–48)
MAGNESIUM SERPL-MCNC: 2.2 MG/DL (ref 1.6–2.6)
MCH RBC QN AUTO: 30.7 PG (ref 27–31)
MCHC RBC AUTO-ENTMCNC: 32 G/DL (ref 32–36)
MCV RBC AUTO: 96 FL (ref 82–98)
MONOCYTES # BLD AUTO: 0 K/UL (ref 0.3–1)
MONOCYTES NFR BLD: 1.1 % (ref 4–15)
NEUTROPHILS # BLD AUTO: 3.3 K/UL (ref 1.8–7.7)
NEUTROPHILS NFR BLD: 86.7 % (ref 38–73)
NRBC BLD-RTO: 0 /100 WBC
PHOSPHATE SERPL-MCNC: 3 MG/DL (ref 2.7–4.5)
PLATELET # BLD AUTO: 148 K/UL (ref 150–450)
PMV BLD AUTO: 10.4 FL (ref 9.2–12.9)
POTASSIUM SERPL-SCNC: 4.9 MMOL/L (ref 3.5–5.1)
PROT SERPL-MCNC: 6.2 G/DL (ref 6–8.4)
PROTHROMBIN TIME: 17.7 SEC (ref 9–12.5)
RBC # BLD AUTO: 2.41 M/UL (ref 4.6–6.2)
SODIUM SERPL-SCNC: 142 MMOL/L (ref 136–145)
WBC # BLD AUTO: 3.79 K/UL (ref 3.9–12.7)

## 2023-12-19 PROCEDURE — 25000003 PHARM REV CODE 250: Mod: HCNC | Performed by: STUDENT IN AN ORGANIZED HEALTH CARE EDUCATION/TRAINING PROGRAM

## 2023-12-19 PROCEDURE — 84100 ASSAY OF PHOSPHORUS: CPT | Mod: HCNC | Performed by: STUDENT IN AN ORGANIZED HEALTH CARE EDUCATION/TRAINING PROGRAM

## 2023-12-19 PROCEDURE — 85025 COMPLETE CBC W/AUTO DIFF WBC: CPT | Mod: HCNC | Performed by: STUDENT IN AN ORGANIZED HEALTH CARE EDUCATION/TRAINING PROGRAM

## 2023-12-19 PROCEDURE — 80053 COMPREHEN METABOLIC PANEL: CPT | Mod: HCNC | Performed by: STUDENT IN AN ORGANIZED HEALTH CARE EDUCATION/TRAINING PROGRAM

## 2023-12-19 PROCEDURE — 36415 COLL VENOUS BLD VENIPUNCTURE: CPT | Mod: HCNC | Performed by: STUDENT IN AN ORGANIZED HEALTH CARE EDUCATION/TRAINING PROGRAM

## 2023-12-19 PROCEDURE — 83735 ASSAY OF MAGNESIUM: CPT | Mod: HCNC | Performed by: STUDENT IN AN ORGANIZED HEALTH CARE EDUCATION/TRAINING PROGRAM

## 2023-12-19 PROCEDURE — 85610 PROTHROMBIN TIME: CPT | Mod: HCNC | Performed by: STUDENT IN AN ORGANIZED HEALTH CARE EDUCATION/TRAINING PROGRAM

## 2023-12-19 PROCEDURE — 30905 PR CTRL 2SEBLEED,POST,W/PACKS &/OR CAUT: ICD-10-PCS | Mod: HCNC,,, | Performed by: STUDENT IN AN ORGANIZED HEALTH CARE EDUCATION/TRAINING PROGRAM

## 2023-12-19 PROCEDURE — 30905 CONTROL OF NOSEBLEED: CPT | Mod: HCNC,,, | Performed by: STUDENT IN AN ORGANIZED HEALTH CARE EDUCATION/TRAINING PROGRAM

## 2023-12-19 RX ORDER — DOXYCYCLINE HYCLATE 100 MG
100 TABLET ORAL EVERY 12 HOURS
Qty: 20 TABLET | Refills: 0 | Status: SHIPPED | OUTPATIENT
Start: 2023-12-19 | End: 2023-12-29

## 2023-12-19 RX ORDER — WARFARIN SODIUM 5 MG/1
5 TABLET ORAL DAILY
Status: DISCONTINUED | OUTPATIENT
Start: 2023-12-19 | End: 2023-12-19 | Stop reason: HOSPADM

## 2023-12-19 RX ADMIN — DOXYCYCLINE HYCLATE 100 MG: 100 TABLET, FILM COATED ORAL at 08:12

## 2023-12-19 RX ADMIN — ALLOPURINOL 100 MG: 100 TABLET ORAL at 08:12

## 2023-12-19 RX ADMIN — NASAL 1 SPRAY: 6.5 SPRAY NASAL at 10:12

## 2023-12-19 RX ADMIN — Medication 324 MG: at 08:12

## 2023-12-19 RX ADMIN — METOPROLOL SUCCINATE 25 MG: 25 TABLET, EXTENDED RELEASE ORAL at 08:12

## 2023-12-19 NOTE — PROGRESS NOTES
[facility] Medicine  Progress Note    Patient Name: Dariel Urbina  MRN: [id number]  Patient Class: IP- Inpatient   Admission Date: 12/15/2023  Length of Stay: 2 days  Attending Physician: Octavio Santos MD  Primary Care Provider: Devon Langston Jr., MD        Subjective:     Principal Problem:Recurrent epistaxis        HPI:  Dariel Urbina is an 83 year old male with a past medical history of CAD s/p CABG 1990's, mechanical AVR (on warfarin) c/b mod-severe MR w/ mitraclip, afib, GIB 2/2 diverticulosis s/p partial colon resection, T2DM (diet controlled), gout, HLD, HTN, CKD4, and recurrent epistaxis on warfarin that presents with recurrent nose bleed. He reports he has had 2 surgeries with ENT in the past. His has had on and off again nose bleeds for the past 2 ½ weeks and decided to come in today because he was feeling more dyspneic. Per chart review, cauterization of left septum was performed on 11/3/23 which initially helped.  He was seen in ENT clinic today and had nasal packing placed and sent to [facility] ED for admission and workup as the patient has been feeling short of breath.     He reports being compliant with his home warfarin which he takes 5mg nightly. INR here was supratherapeutic at 5.2. Reports last dose of warfarin was yesterday. He also reports swelling in his lower extremities for the past week which is new for him. He denies F/C, N/V, chest pain, abdominal pain, dysuria.     Upon arrival to the ED, patient afebrile, HR 60s, -160's/70's, saturating 100% on RA. Labs notable for Hgb 6.9, Plt count 144, INR 5.2 (on warfarin, goal 2-3), Cr 2.8 (BL 1.8-2.8), trop 0.055 -> 0.047, .    Overview/Hospital Course:  Patient admitted for recurrent epistaxis. Plan for cauterization 12/18 per ENT. INR downtrending with holding warfarin. Pharmacy following for warfarin titration. Patient required 1u pRBC transfusion on admission for acute blood loss anemia. Hgb remained stable  and had minimal nose bleeds. Plan for cauterization 12/18 with ENT. Hgb remained stable.  INR 1.9 and warfarin restarted for goal 2-3.    Interval History: Pt seen and examined this morning on rounds. NAEON. No nose bleeds. Hgb remains stable. Had endoscopic cauterization of nasal epistaxis. Tolerated procedure well.      Objective:     Vital Signs (Most Recent):  Temp: 97.7 °F (36.5 °C) (12/18/23 1800)  Pulse: (!) 53 (12/18/23 1753)  Resp: 12 (12/18/23 1800)  BP: (!) 158/70 (12/18/23 1800)  SpO2: (!) 93 % (12/18/23 1745) Vital Signs (24h Range):  Temp:  [97 °F (36.1 °C)-98.5 °F (36.9 °C)] 97.7 °F (36.5 °C)  Pulse:  [52-65] 53  Resp:  [11-19] 12  SpO2:  [92 %-100 %] 93 %  BP: (103-191)/(51-91) 158/70     Weight: 78 kg (172 lb)  Body mass index is 28.62 kg/m².    Intake/Output Summary (Last 24 hours) at 12/18/2023 1838  Last data filed at 12/18/2023 1630  Gross per 24 hour   Intake 500 ml   Output --   Net 500 ml      Physical Exam  Gen: in NAD, appears stated age  Neuro: AAOx4, CN2-12 grossly intact BL; motor, sensory, and strength grossly intact BL  HEENT: Dried blood around nares, packing in place, no active bleeding noted. NTNC, EOMI, PERRLA, MMM; no thyromegaly or lymphadenopathy; no JVD appreciated  CVS: Mechanical click noted, normal rate, S1/S2 auscultated with no S3 or S4; capillary refill < 2 sec  Resp: lungs CTAB, no w/r/r; no belabored breathing or accessory muscle use appreciated   Abd: BS+ in all 4 quadrants; NTND, soft to palpation; no organomegaly appreciated   Extrem: pulses full, equal, and regular over all 4 extremities; no UE or LE edema BL        MELD 3.0: 22 at 12/18/2023  5:43 AM  MELD-Na: 22 at 12/18/2023  5:43 AM  Calculated from:  Serum Creatinine: 2.4 mg/dL at 12/18/2023  5:43 AM  Serum Sodium: 138 mmol/L (Using max of 137 mmol/L) at 12/18/2023  5:43 AM  Total Bilirubin: 0.4 mg/dL (Using min of 1 mg/dL) at 12/18/2023  5:43 AM  Serum Albumin: 3.1 g/dL at 12/18/2023  5:43 AM  INR(ratio): 1.9  at 12/18/2023  5:43 AM  Age at listing (hypothetical): 83 years  Sex: Male at 12/18/2023  5:43 AM      Significant Labs:  CBC:  Recent Labs   Lab 12/17/23  0351 12/18/23  0543   WBC 4.99 4.42   HGB 7.3* 7.8*   HCT 22.5* 25.2*   * 147*     CMP:  Recent Labs   Lab 12/17/23  0351 12/18/23  0543    138   K 4.4 4.1    110   CO2 21* 20*   GLU 94 93   BUN 53* 52*   CREATININE 2.7* 2.4*   CALCIUM 9.4 9.6   PROT 6.0 6.5   ALBUMIN 2.9* 3.1*   BILITOT 0.4 0.4   ALKPHOS 69 81   AST 19 17   ALT 13 15   ANIONGAP 12 8     PTINR:  Recent Labs   Lab 12/17/23  0351 12/18/23  0543   INR 2.9* 1.9*       Significant Procedures:   Dobutamine Stress Test with Color Flow: No results found. However, due to the size of the patient record, not all encounters were searched. Please check Results Review for a complete set of results.    Assessment/Plan:      * Recurrent epistaxis  Patient with history of recurrent epistaxis while on warfarin. Has had 2 cauterizations with ENT, last one in November of this year. Seen in ENT clinic today for continued bleeding for past 2.5 weeks. S/p left Merocel placed in ENT clinic on 12/15. Now presenting with acute blood loss anemia requiring RBC transfusion in setting of supra-therapeutic warfarin (INR 5.2)   - ENT consulted, appreciate recs  - Sodium chloride nasal spray BL Q4H while awake  - Oxymetazoline nasal spray to left PRN for epistaxis  - Augmentin BID while nasal packing in place  - Daily INR's, pharmacy to assist in dosing  - S/p endoscopic cauterization of nasal epistaxis with ENT 12/18.  - INR 1.9 today, pharmacy recommending restarting warfarin @ 2.5mg daily    Acute blood loss anemia  Patient's anemia is currently controlled. Has received 1 units of PRBCs on 1 . Etiology likely d/t acute blood loss which was from epistaxis  Current CBC reviewed-   Lab Results   Component Value Date    HGB 7.8 (L) 12/18/2023    HCT 25.2 (L) 12/18/2023   - Transfuse to goal Hgb > 7  - Monitor  serial CBC and transfuse if patient becomes hemodynamically unstable, symptomatic or H/H drops below 7/21.  - Daily INR's  - INR 1.9 today, goal INR 2-3; warfarin restarted at 2.5mg     Supratherapeutic INR  - INR 5.2 on admission  - Goal INR 2-3  - INR now 1.9  - warfarin dose as above    Chronic combined systolic and diastolic heart failure  Chronic history, now appears to be recovered as most recent TTE 4/2023 with EF 60%, moderate MR. Saturating well on RA. Lung sounds clear. Notable for 1+ BL ALEXANDRA which is new for him. 's on admission  - S/p IV 40mg Lasix in ED  - Strict I/O's, daily volume assessments  - Continue home bumex, Imdur, metoprolol    CKD (chronic kidney disease), stage IV  Creatine stable for now. BMP reviewed- noted Estimated Creatinine Clearance: 22.9 mL/min (A) (based on SCr of 2.4 mg/dL (H)). according to latest data. 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   - BL creatinine 1.8-2.8. Continues to have good UOP, no electrolyte abnormalities.  - Monitor UOP and serial BMP and adjust therapy as needed.   - Renally dose meds.   - Avoid nephrotoxic medications and procedures.    Type 2 diabetes mellitus with diabetic peripheral angiopathy without gangrene  - History of T2DM, Latest A1c 6.1  - Diet controlled, not on home DM meds  - SSI here  - POCT glucose checks as ordered  - Hypoglycemic protocol in effect    Paroxysmal atrial fibrillation  - Currently in afib on EKG, stable rate  - Continue home regimen of metop for rate control  - Supra-therapeutic, holding warfarin  - Will continue to monitor on tele    Coronary artery disease involving native coronary artery of native heart without angina pectoris  History of CAD s/p CABG in 1990's  - Continue statin    History of gout  - continue allopurinol    Renovascular hypertension  - Working to optimize BP control  - Continue home regimen of Imdur, metop  - Will continue to monitor and further titrate antihypertensives as  clinically indicated     Hyperlipidemia associated with type 2 diabetes mellitus  - continue home statin        VTE Risk Mitigation (From admission, onward)           Ordered     warfarin (COUMADIN) tablet 2.5 mg  Daily         12/18/23 1300     IP VTE HIGH RISK PATIENT  Once         12/15/23 1342     Place sequential compression device  Until discontinued         12/15/23 1342                    Discharge Planning   ASTER: 12/19/2023     Code Status: Full Code   Is the patient medically ready for discharge?: No    Reason for patient still in hospital (select all that apply): Patient trending condition  Discharge Plan A: Home with family                  Octavio Santos MD  Department of Hospital Medicine   [facility]

## 2023-12-19 NOTE — SUBJECTIVE & OBJECTIVE
Interval History: POD#1 nasal endoscopy and cauterization of epistaxis. Dissolvable packing placed. Some purulence noted from right posterior ethmoid region that was cultured. Started on doxycycline.    Medications:  Continuous Infusions:  Scheduled Meds:   allopurinoL  100 mg Oral Daily    atorvastatin  80 mg Oral QHS    bumetanide  1 mg Oral Every other day    doxycycline  100 mg Oral Q12H    ferrous gluconate  324 mg Oral Daily with breakfast    isosorbide mononitrate  60 mg Oral QHS    metoprolol succinate  25 mg Oral Daily    omega 3-dha-epa-fish oil  1 capsule Oral QHS    sodium chloride  1 spray Each Nostril Q4H While awake    warfarin  2.5 mg Oral Daily     PRN Meds:acetaminophen, aluminum-magnesium hydroxide-simethicone, dextrose 10%, dextrose 10%, dextrose, dextrose, glucagon (human recombinant), labetaloL, melatonin, naloxone, ondansetron, polyethylene glycol, prochlorperazine, sodium chloride 0.9%, sodium chloride 0.9%, traZODone     Review of patient's allergies indicates:   Allergen Reactions    Lisinopril     Losartan      Intolerance- elevates potassium level     Objective:     Vital Signs (24h Range):  Temp:  [97.3 °F (36.3 °C)-98.5 °F (36.9 °C)] 97.3 °F (36.3 °C)  Pulse:  [49-65] 59  Resp:  [11-19] 18  SpO2:  [93 %-100 %] 95 %  BP: (118-191)/(59-91) 118/59     Lines/Drains/Airways       Peripheral Intravenous Line  Duration                  Peripheral IV - Single Lumen 12/17/23 1658 20 G;1 3/4 in Anterior;Left Forearm 1 day                   Physical Exam  Resting on hospital bed, in no acute distress  Bilateral nares without bleeding  Oropharynx patent, no blood pooling or streaking in the OP   Breathing comfortably     Significant Labs:  BMP:   Recent Labs   Lab 12/19/23  0234   *      CO2 20*   BUN 50*   CREATININE 2.4*   CALCIUM 9.6   MG 2.2     CBC:   Recent Labs   Lab 12/19/23  0234   WBC 3.79*   RBC 2.41*   HGB 7.4*   HCT 23.1*   *   MCV 96   MCH 30.7   MCHC 32.0      Coagulation:   Recent Labs   Lab 12/15/23  0956 12/16/23  0254 12/19/23  0234   LABPROT 50.1*   < > 17.7*   INR 5.2*   < > 1.7*   APTT 39.3*  --   --     < > = values in this interval not displayed.     Significant Diagnostics:  I have reviewed all pertinent imaging results/findings within the past 24 hours.

## 2023-12-19 NOTE — ASSESSMENT & PLAN NOTE
Patient's anemia is currently controlled. Has received 1 units of PRBCs on 1 . Etiology likely d/t acute blood loss which was from epistaxis  Current CBC reviewed-   Lab Results   Component Value Date    HGB 7.8 (L) 12/18/2023    HCT 25.2 (L) 12/18/2023   - Transfuse to goal Hgb > 7  - Monitor serial CBC and transfuse if patient becomes hemodynamically unstable, symptomatic or H/H drops below 7/21.  - Daily INR's  - INR 1.9 today, goal INR 2-3; warfarin restarted at 2.5mg

## 2023-12-19 NOTE — CONSULTS
PHARMACY CONSULT NOTE: WARFARIN  Dariel Urbina is a 83 y.o. male on warfarin therapy for Mechanical Heart Valve. PharmD has been consulted for warfarin dosing.    Current order: Warfarin 2.5 mg daily  Home dose: Warfarin 5 mg daily  Coumadin clinic enrollment: Active  INR goal: 2-3    Lab Results   Component Value Date    INR 1.7 (H) 12/19/2023    INR 1.9 (H) 12/18/2023    INR 2.9 (H) 12/17/2023    PROTIME 26.6 (H) 10/29/2023    PROTIME 28.4 (H) 09/30/2023       Recommendation(s):   INR trending down 1.9 > 1.7. Warfarin 2.5 mg was not given last night  Patient is ready to be discharge today. Discussed with team. Will restart home dose of Warfarin 5 mg daily.  Patient to follow up with Coumadin clinic as soon as possible   Counseled patient and answered all questions  Sent Coumadin clinic a message to update care plan  Pharmacy will will sign off    Thank you for the consult,  Linda Zavala PharmD  Extension - [phone]    **Note: Consults are reviewed Monday-Friday 7:00am-3:30pm. THE ABOVE RECOMMENDATIONS ARE ONLY SUGGESTED.THE RECOMMENDATIONS SHOULD BE CONSIDERED IN CONJUNCTION WITH ALL PATIENT FACTORS. **

## 2023-12-19 NOTE — PLAN OF CARE
[facility]  Discharge Final Note    Primary Care Provider: Devon Langston Jr., MD    Expected Discharge Date: 12/19/2023    Final Discharge Note (most recent)       Final Note - 12/19/23 0923          Final Note    Assessment Type Final Discharge Note     Anticipated Discharge Disposition Home or Self Care     Hospital Resources/Appts/Education Provided Appointments scheduled and added to AVS        Post-Acute Status    Post-Acute Authorization Other     Other Status No Post-Acute Service Needs     Discharge Delays None known at this time                     Important Message from Medicare  Important Message from Medicare regarding Discharge Appeal Rights: Given to patient/caregiver, Signed/date by patient/caregiver, Explained to patient/caregiver     Date IMM was signed: 12/18/23  Time IMM was signed: 1104    Contact Info       Devon Langston Jr., MD   Specialty: Internal Medicine   Relationship: PCP - General    [address]  [place] [address]   Phone: [phone]       Next Steps: Follow up    Jono Russell MD   Specialty: Otolaryngology   Relationship: Consulting Physician    [address] [facility] [address]   Phone: [phone]       Next Steps: Call in 1 week(s)    Instructions: As needed

## 2023-12-19 NOTE — ASSESSMENT & PLAN NOTE
Patient with history of recurrent epistaxis while on warfarin. Has had 2 cauterizations with ENT, last one in November of this year. Seen in ENT clinic today for continued bleeding for past 2.5 weeks. S/p left Merocel placed in ENT clinic on 12/15. Now presenting with acute blood loss anemia requiring RBC transfusion in setting of supra-therapeutic warfarin (INR 5.2)   - ENT consulted, appreciate recs  - Sodium chloride nasal spray BL Q4H while awake  - Oxymetazoline nasal spray to left PRN for epistaxis  - Augmentin BID while nasal packing in place  - Daily INR's, pharmacy to assist in dosing  - S/p endoscopic cauterization of nasal epistaxis with ENT 12/18.  - INR 1.9 today, pharmacy recommending restarting warfarin @ 2.5mg daily

## 2023-12-19 NOTE — ASSESSMENT & PLAN NOTE
83M with pAF, mechanical aortic valve (on warfarin), and recurrent epistaxis s/p multiple procedures. Left Merocel placed in ENT clinic on 12/15. Presented to ED for anemia in CKD setting. S/p OR for cauterization on 12/18.      -- Sodium chloride nasal spray bilaterally Q4H while awake  -- Oxymetazoline nasal spray on the left PRN for epistaxis  -- Augmentin discontinued; start doxycycline 10d  -- Okay to discharge per ENT; patient knows how to reach ENT clinic for any future epistaxis  -- Please Secure Chat me for any questions, page ENT on-call if unresponsive or urgent

## 2023-12-19 NOTE — SUBJECTIVE & OBJECTIVE
Interval History: Pt seen and examined this morning on shahida OLIVO. No nose bleeds. Hgb remains stable. Had endoscopic cauterization of nasal epistaxis. Tolerated procedure well.      Objective:     Vital Signs (Most Recent):  Temp: 97.7 °F (36.5 °C) (12/18/23 1800)  Pulse: (!) 53 (12/18/23 1753)  Resp: 12 (12/18/23 1800)  BP: (!) 158/70 (12/18/23 1800)  SpO2: (!) 93 % (12/18/23 1745) Vital Signs (24h Range):  Temp:  [97 °F (36.1 °C)-98.5 °F (36.9 °C)] 97.7 °F (36.5 °C)  Pulse:  [52-65] 53  Resp:  [11-19] 12  SpO2:  [92 %-100 %] 93 %  BP: (103-191)/(51-91) 158/70     Weight: 78 kg (172 lb)  Body mass index is 28.62 kg/m².    Intake/Output Summary (Last 24 hours) at 12/18/2023 1838  Last data filed at 12/18/2023 1630  Gross per 24 hour   Intake 500 ml   Output --   Net 500 ml      Physical Exam  Gen: in NAD, appears stated age  Neuro: AAOx4, CN2-12 grossly intact BL; motor, sensory, and strength grossly intact BL  HEENT: Dried blood around nares, packing in place, no active bleeding noted. NTNC, EOMI, PERRLA, MMM; no thyromegaly or lymphadenopathy; no JVD appreciated  CVS: Mechanical click noted, normal rate, S1/S2 auscultated with no S3 or S4; capillary refill < 2 sec  Resp: lungs CTAB, no w/r/r; no belabored breathing or accessory muscle use appreciated   Abd: BS+ in all 4 quadrants; NTND, soft to palpation; no organomegaly appreciated   Extrem: pulses full, equal, and regular over all 4 extremities; no UE or LE edema BL        MELD 3.0: 22 at 12/18/2023  5:43 AM  MELD-Na: 22 at 12/18/2023  5:43 AM  Calculated from:  Serum Creatinine: 2.4 mg/dL at 12/18/2023  5:43 AM  Serum Sodium: 138 mmol/L (Using max of 137 mmol/L) at 12/18/2023  5:43 AM  Total Bilirubin: 0.4 mg/dL (Using min of 1 mg/dL) at 12/18/2023  5:43 AM  Serum Albumin: 3.1 g/dL at 12/18/2023  5:43 AM  INR(ratio): 1.9 at 12/18/2023  5:43 AM  Age at listing (hypothetical): 83 years  Sex: Male at 12/18/2023  5:43 AM      Significant Labs:  CBC:  Recent  Labs   Lab 12/17/23  0351 12/18/23  0543   WBC 4.99 4.42   HGB 7.3* 7.8*   HCT 22.5* 25.2*   * 147*     CMP:  Recent Labs   Lab 12/17/23  0351 12/18/23  0543    138   K 4.4 4.1    110   CO2 21* 20*   GLU 94 93   BUN 53* 52*   CREATININE 2.7* 2.4*   CALCIUM 9.4 9.6   PROT 6.0 6.5   ALBUMIN 2.9* 3.1*   BILITOT 0.4 0.4   ALKPHOS 69 81   AST 19 17   ALT 13 15   ANIONGAP 12 8     PTINR:  Recent Labs   Lab 12/17/23  0351 12/18/23  0543   INR 2.9* 1.9*       Significant Procedures:   Dobutamine Stress Test with Color Flow: No results found. However, due to the size of the patient record, not all encounters were searched. Please check Results Review for a complete set of results.

## 2023-12-19 NOTE — PROGRESS NOTES
12/19/23 INR due today, Wife called to report Patient has been InPt at [facility] since 12/15 due to nose bleeds, states Patient is being discharged today, and will be discharged on an antibiotic--not sure what antibiotic yet, was told to call coumadin clinic for appointment, wife's call back # [phone]

## 2023-12-19 NOTE — PROGRESS NOTES
[facility]  Otorhinolaryngology-Head & Neck Surgery  Progress Note    Subjective:     Post-Op Info:  Procedure(s) (LRB):  CAUTERIZATION, NOSE, ENDOSCOPIC (N/A)   1 Day Post-Op  Hospital Day: 5     Interval History: POD#1 nasal endoscopy and cauterization of epistaxis. Dissolvable packing placed. Some purulence noted from right posterior ethmoid region that was cultured. Started on doxycycline.    Medications:  Continuous Infusions:  Scheduled Meds:   allopurinoL  100 mg Oral Daily    atorvastatin  80 mg Oral QHS    bumetanide  1 mg Oral Every other day    doxycycline  100 mg Oral Q12H    ferrous gluconate  324 mg Oral Daily with breakfast    isosorbide mononitrate  60 mg Oral QHS    metoprolol succinate  25 mg Oral Daily    omega 3-dha-epa-fish oil  1 capsule Oral QHS    sodium chloride  1 spray Each Nostril Q4H While awake    warfarin  2.5 mg Oral Daily     PRN Meds:acetaminophen, aluminum-magnesium hydroxide-simethicone, dextrose 10%, dextrose 10%, dextrose, dextrose, glucagon (human recombinant), labetaloL, melatonin, naloxone, ondansetron, polyethylene glycol, prochlorperazine, sodium chloride 0.9%, sodium chloride 0.9%, traZODone     Review of patient's allergies indicates:   Allergen Reactions    Lisinopril     Losartan      Intolerance- elevates potassium level     Objective:     Vital Signs (24h Range):  Temp:  [97.3 °F (36.3 °C)-98.5 °F (36.9 °C)] 97.3 °F (36.3 °C)  Pulse:  [49-65] 59  Resp:  [11-19] 18  SpO2:  [93 %-100 %] 95 %  BP: (118-191)/(59-91) 118/59     Lines/Drains/Airways       Peripheral Intravenous Line  Duration                  Peripheral IV - Single Lumen 12/17/23 1658 20 G;1 3/4 in Anterior;Left Forearm 1 day                   Physical Exam  Resting on hospital bed, in no acute distress  Bilateral nares without bleeding  Oropharynx patent, no blood pooling or streaking in the OP   Breathing comfortably     Significant Labs:  BMP:   Recent Labs   Lab 12/19/23  0234   *   CL  109   CO2 20*   BUN 50*   CREATININE 2.4*   CALCIUM 9.6   MG 2.2     CBC:   Recent Labs   Lab 12/19/23  0234   WBC 3.79*   RBC 2.41*   HGB 7.4*   HCT 23.1*   *   MCV 96   MCH 30.7   MCHC 32.0     Coagulation:   Recent Labs   Lab 12/15/23  0956 12/16/23  0254 12/19/23  0234   LABPROT 50.1*   < > 17.7*   INR 5.2*   < > 1.7*   APTT 39.3*  --   --     < > = values in this interval not displayed.     Significant Diagnostics:  I have reviewed all pertinent imaging results/findings within the past 24 hours.  Assessment/Plan:     * Recurrent epistaxis  83M with pAF, mechanical aortic valve (on warfarin), and recurrent epistaxis s/p multiple procedures. Left Merocel placed in ENT clinic on 12/15. Presented to ED for anemia in CKD setting. S/p OR for cauterization on 12/18.      -- Sodium chloride nasal spray bilaterally Q4H while awake  -- Oxymetazoline nasal spray on the left PRN for epistaxis  -- Augmentin discontinued; start doxycycline 10d  -- Okay to discharge per ENT; patient knows how to reach ENT clinic for any future epistaxis  -- Please Secure Chat me for any questions, page ENT on-call if unresponsive or urgent        Tank Gomez MD  Otorhinolaryngology-Head & Neck Surgery  [facility]

## 2023-12-19 NOTE — OP NOTE
DATE OF PROCEDURE: 12/18/2023     PREOPERATIVE DIAGNOSES:   Recurrent epistaxis [R04.0]    POSTOPERATIVE DIAGNOSES:   Recurrent epistaxis [R04.0]    SURGEON:  Surgeon(s) and Role:     * Jono Russell MD - Primary     * Tank Gomez MD - Resident - Assisting    PROCEDURES PERFORMED:   Nasal endoscopy and control of epistaxis with cautery.     ANESTHESIA: General    FINDINGS:   Diffuse oozing from the left septum.  Small anterior perforation.   Purulence in the right sphenoid.     INDICATIONS FOR PROCEDURE:   Dariel Urbina is a 83 y.o. male well known to me with recurrent mostly left blighted epistaxis.  He would previously undergone bilateral sphenopalatine artery ligation and bilateral facial and internal maxillary embolization.  He was seen in clinic with the current left-sided epistaxis, fatigue, weakness and general palate.  A Merocel pack was placed and patient was sent to the emergency room for medical evaluation.  He was found to have a supratherapeutic INR at 5.1 and he was noted to be anemic with a hemoglobin of 5.9.  Once in a Medical correction of his INR was performed and he was transfused he was advised to undergo nasal endoscopy with cautery of his septum.    He was apprised of the risks, benefits and alternatives to surgery.  In spite of the risk inherent to surgery, he provided informed consent for the aforementioned procedures.     PROCEDURE IN DETAIL:  The patient was taken to the operating room and placed on the operating table in the supine position.  General endotracheal anesthesia was induced by the anesthesia team.     A preoperative time-out was taken to confirm the patient procedure being performed.  The patient was then prepped and draped in usual sterile fashion.  He was rotated 90° away from anesthesia.    A 0 degree endoscope was used to assess the left and right nasal cavities.  The left-sided Merocel was removed.  There was diffuse oozing along the left nasal septum.   Suction Bovie on 15 w was used to control the diffuse oozing throughout the septum.  The endoscope was used to assess the right nasal cavity.  The did not appear to be any significant vessels to cauterize.  Purulence was noted from the right sphenoid ethmoidal recess.  There was thick purulence coming from the sphenoid os.  Cultures were taken.  A suction was used to evacuate any purulence from the right-sided sphenoid.    Hemostasis was confirmed.  A Posicept nasal pack  was placed in the left nasal cavity to assist with healing.     The patient was then rotated back to anesthesia.  He was extubated without difficulty.  Transferred to the recovery room where remained in stable condition.    All sponge and needle counts were correct at the end of the case x2.    There were no intraoperative complications.  I was present for and participated in the entire procedure as dictated above.       ESTIMATED BLOOD LOSS: 10cc    SPECIMENS:   Specimen (24h ago, onward)      None            Jono Russell MD

## 2023-12-19 NOTE — PROGRESS NOTES
Pt has been d/c'd from the hospital s/p cauterization for epistaxis.  Pt has held warfarin 2/2 to supratherapeutic INR also.  He is being d/c'd w/ doxy.  Recommend to decrease maintenance dose & decrease for DDI.  Plan to re-assess next Wednesday.

## 2023-12-19 NOTE — ASSESSMENT & PLAN NOTE
Creatine stable for now. BMP reviewed- noted Estimated Creatinine Clearance: 22.9 mL/min (A) (based on SCr of 2.4 mg/dL (H)). according to latest data. 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   - BL creatinine 1.8-2.8. Continues to have good UOP, no electrolyte abnormalities.  - Monitor UOP and serial BMP and adjust therapy as needed.   - Renally dose meds.   - Avoid nephrotoxic medications and procedures.

## 2023-12-19 NOTE — DISCHARGE SUMMARY
[facility] Medicine  Discharge Summary      Patient Name: Dariel Urbina  MRN: [id number]  EMELIA: [id number]  Patient Class: IP- Inpatient  Admission Date: 12/15/2023  Hospital Length of Stay: 3 days  Discharge Date and Time:  12/19/2023 7:28 AM  Attending Physician: Octavio Santos MD   Discharging Provider: Octavio Santos MD  Primary Care Provider: Devon Langston Jr., MD  Hospital Medicine Team: [facility] Octavio Santos MD  Primary Care Team: [facility]    HPI:   Dariel Urbina is an 83 year old male with a past medical history of CAD s/p CABG 1990's, mechanical AVR (on warfarin) c/b mod-severe MR w/ mitraclip, afib, GIB 2/2 diverticulosis s/p partial colon resection, T2DM (diet controlled), gout, HLD, HTN, CKD4, and recurrent epistaxis on warfarin that presents with recurrent nose bleed. He reports he has had 2 surgeries with ENT in the past. His has had on and off again nose bleeds for the past 2 ½ weeks and decided to come in today because he was feeling more dyspneic. Per chart review, cauterization of left septum was performed on 11/3/23 which initially helped.  He was seen in ENT clinic today and had nasal packing placed and sent to [facility] ED for admission and workup as the patient has been feeling short of breath.     He reports being compliant with his home warfarin which he takes 5mg nightly. INR here was supratherapeutic at 5.2. Reports last dose of warfarin was yesterday. He also reports swelling in his lower extremities for the past week which is new for him. He denies F/C, N/V, chest pain, abdominal pain, dysuria.     Upon arrival to the ED, patient afebrile, HR 60s, -160's/70's, saturating 100% on RA. Labs notable for Hgb 6.9, Plt count 144, INR 5.2 (on warfarin, goal 2-3), Cr 2.8 (BL 1.8-2.8), trop 0.055 -> 0.047, .    Procedure(s) (LRB):  CAUTERIZATION, NOSE, ENDOSCOPIC (N/A)      Hospital Course:   Patient admitted for recurrent epistaxis. Plan for  cauterization 12/18 per ENT. INR downtrending with holding warfarin. Pharmacy following for warfarin titration. Patient required 1u pRBC transfusion on admission for acute blood loss anemia. Hgb remained stable and had minimal nose bleeds. Plan for cauterization 12/18 with ENT. Hgb remained stable and no bleeds after procedure. INR day of discharge 1.7, pharmacy recommending warfarin 5mg upon discharge with close follow up with coumadin clinic which they contacted. Patient stable for discharge.     Goals of Care Treatment Preferences:  Code Status: Full Code      Consults:   Consults (From admission, onward)          Status Ordering Provider     [facility]  Once        Provider:  (Not yet assigned)    Completed SUJATA MCDERMOTT     [facility] Medicine PharmD Consult  Once        Provider:  (Not yet assigned)    Acknowledged EDSON JOYA            No new Assessment & Plan notes have been filed under this hospital service since the last note was generated.  Service: Hospital Medicine    Final Active Diagnoses:    Diagnosis Date Noted POA    PRINCIPAL PROBLEM:  Recurrent epistaxis [R04.0] 12/21/2022 Yes    Acute blood loss anemia [D62] 12/21/2022 Yes    Supratherapeutic INR [R79.1] 12/21/2022 Yes    Chronic combined systolic and diastolic heart failure [I50.42] 12/31/2022 Yes    CKD (chronic kidney disease), stage IV [N18.4] 02/10/2022 Yes    Type 2 diabetes mellitus with diabetic peripheral angiopathy without gangrene [E11.51] 05/27/2015 Yes    Paroxysmal atrial fibrillation [I48.0] 02/06/2023 Yes    Coronary artery disease involving native coronary artery of native heart without angina pectoris [I25.10] 12/29/2022 Yes    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] 09/26/2012 Yes    Hyperlipidemia associated with type 2 diabetes mellitus [E11.69, E78.5] 09/26/2012 Yes    History of gout [Z87.39] 09/26/2012 Yes      Problems Resolved During this Admission:       Discharged Condition:  good    Disposition: Home or Self Care    Follow Up:   Follow-up Information       Devon Langston Jr., MD Follow up.    Specialty: Internal Medicine  Contact information:  [address]  [facility] [address]  [phone]               Jono Russell MD. Call in 1 week(s).    Specialty: Otolaryngology  Why: As needed  Contact information:  [address]  [facility] [address]  [phone]                           Patient Instructions:   No discharge procedures on file.    Significant Diagnostic Studies: N/A    Pending Diagnostic Studies:       None           Medications:  Reconciled Home Medications:      Medication List        START taking these medications      doxycycline 100 MG tablet  Commonly known as: VIBRA-TABS  Take 1 tablet (100 mg total) by mouth every 12 (twelve) hours. for 10 days            CONTINUE taking these medications      acetaminophen 500 MG tablet  Commonly known as: TYLENOL  Take 500 mg by mouth daily as needed for Pain.     allopurinoL 100 MG tablet  Commonly known as: ZYLOPRIM  Take 1 tablet (100 mg total) by mouth once daily.     bumetanide 1 MG tablet  Commonly known as: BUMEX  Take 1 tablet (1 mg total) by mouth every other day.     ferrous gluconate 324 MG tablet  Commonly known as: FERGON  Take 1 tablet (324 mg total) by mouth daily with breakfast.     fish oil-omega-3 fatty acids 300-1,000 mg capsule  Take 1 capsule by mouth once daily.     isosorbide mononitrate 60 MG 24 hr tablet  Commonly known as: IMDUR  Take 1 tablet (60 mg total) by mouth every evening.     metoprolol succinate 25 MG 24 hr tablet  Commonly known as: TOPROL-XL  Take 1 tablet (25 mg total) by mouth once daily.     rosuvastatin 40 MG Tab  Commonly known as: CRESTOR  TAKE 1 TABLET EVERY EVENING.     traZODone 50 MG tablet  Commonly known as: DESYREL  Take 1 tablet (50 mg total) by mouth nightly as needed for Insomnia.     warfarin 5 MG tablet  Commonly known as: COUMADIN  Take warfarin 7.5mg (1.5  tablets) PO Tuesday & Saturday, then take 5mg PO all other days or as instructed by Coumadin Clinic              Indwelling Lines/Drains at time of discharge:   Lines/Drains/Airways       None                   Time spent on the discharge of patient: 35 minutes         Octavio Santos MD  Department of Hospital Medicine  [facility]

## 2023-12-20 LAB — BACTERIA SPEC AEROBE CULT: ABNORMAL

## 2023-12-22 ENCOUNTER — OFFICE VISIT (OUTPATIENT)
Dept: INTERNAL MEDICINE | Facility: CLINIC | Age: 83
End: 2023-12-22
Payer: MEDICARE

## 2023-12-22 ENCOUNTER — HOSPITAL ENCOUNTER (EMERGENCY)
Facility: HOSPITAL | Age: 83
Discharge: HOME | End: 2023-12-22
Attending: EMERGENCY MEDICINE
Payer: MEDICARE

## 2023-12-22 VITALS
DIASTOLIC BLOOD PRESSURE: 58 MMHG | HEIGHT: 66 IN | BODY MASS INDEX: 27.92 KG/M2 | SYSTOLIC BLOOD PRESSURE: 128 MMHG | HEART RATE: 89 BPM | RESPIRATION RATE: 15 BRPM | WEIGHT: 173.75 LBS | OXYGEN SATURATION: 98 %

## 2023-12-22 VITALS
WEIGHT: 173 LBS | TEMPERATURE: 98 F | HEIGHT: 66 IN | HEART RATE: 65 BPM | DIASTOLIC BLOOD PRESSURE: 56 MMHG | BODY MASS INDEX: 27.8 KG/M2 | OXYGEN SATURATION: 100 % | SYSTOLIC BLOOD PRESSURE: 129 MMHG | RESPIRATION RATE: 17 BRPM

## 2023-12-22 DIAGNOSIS — N25.81 SECONDARY HYPERPARATHYROIDISM OF RENAL ORIGIN: ICD-10-CM

## 2023-12-22 DIAGNOSIS — R04.0 EPISTAXIS: ICD-10-CM

## 2023-12-22 DIAGNOSIS — N18.4 CKD (CHRONIC KIDNEY DISEASE), STAGE IV: ICD-10-CM

## 2023-12-22 DIAGNOSIS — E11.51 TYPE 2 DIABETES MELLITUS WITH DIABETIC PERIPHERAL ANGIOPATHY WITHOUT GANGRENE, WITH LONG-TERM CURRENT USE OF INSULIN: ICD-10-CM

## 2023-12-22 DIAGNOSIS — Z79.4 TYPE 2 DIABETES MELLITUS WITH DIABETIC PERIPHERAL ANGIOPATHY WITHOUT GANGRENE, WITH LONG-TERM CURRENT USE OF INSULIN: ICD-10-CM

## 2023-12-22 DIAGNOSIS — I50.33 ACUTE ON CHRONIC DIASTOLIC HEART FAILURE: ICD-10-CM

## 2023-12-22 DIAGNOSIS — I48.0 PAROXYSMAL ATRIAL FIBRILLATION: ICD-10-CM

## 2023-12-22 DIAGNOSIS — I50.42 CHRONIC COMBINED SYSTOLIC AND DIASTOLIC HEART FAILURE: ICD-10-CM

## 2023-12-22 DIAGNOSIS — Z09 HOSPITAL DISCHARGE FOLLOW-UP: Primary | ICD-10-CM

## 2023-12-22 DIAGNOSIS — R04.0 EPISTAXIS: Primary | ICD-10-CM

## 2023-12-22 LAB
BACTERIA SPEC ANAEROBE CULT: ABNORMAL
BASOPHILS # BLD AUTO: 0.02 K/UL (ref 0–0.2)
BASOPHILS NFR BLD: 0.3 % (ref 0–1.9)
DIFFERENTIAL METHOD: ABNORMAL
EOSINOPHIL # BLD AUTO: 0.2 K/UL (ref 0–0.5)
EOSINOPHIL NFR BLD: 2.6 % (ref 0–8)
ERYTHROCYTE [DISTWIDTH] IN BLOOD BY AUTOMATED COUNT: 14.6 % (ref 11.5–14.5)
HCT VFR BLD AUTO: 26.2 % (ref 40–54)
HGB BLD-MCNC: 8.4 G/DL (ref 14–18)
IMM GRANULOCYTES # BLD AUTO: 0.01 K/UL (ref 0–0.04)
IMM GRANULOCYTES NFR BLD AUTO: 0.2 % (ref 0–0.5)
INR PPP: 1.7 (ref 0.8–1.2)
LYMPHOCYTES # BLD AUTO: 1.1 K/UL (ref 1–4.8)
LYMPHOCYTES NFR BLD: 18.1 % (ref 18–48)
MCH RBC QN AUTO: 30.7 PG (ref 27–31)
MCHC RBC AUTO-ENTMCNC: 32.1 G/DL (ref 32–36)
MCV RBC AUTO: 96 FL (ref 82–98)
MONOCYTES # BLD AUTO: 0.7 K/UL (ref 0.3–1)
MONOCYTES NFR BLD: 11.1 % (ref 4–15)
NEUTROPHILS # BLD AUTO: 4.2 K/UL (ref 1.8–7.7)
NEUTROPHILS NFR BLD: 67.7 % (ref 38–73)
NRBC BLD-RTO: 0 /100 WBC
PLATELET # BLD AUTO: 167 K/UL (ref 150–450)
PMV BLD AUTO: 10.3 FL (ref 9.2–12.9)
PROTHROMBIN TIME: 17.9 SEC (ref 9–12.5)
RBC # BLD AUTO: 2.74 M/UL (ref 4.6–6.2)
WBC # BLD AUTO: 6.19 K/UL (ref 3.9–12.7)

## 2023-12-22 PROCEDURE — 3078F DIAST BP <80 MM HG: CPT | Mod: HCNC,CPTII,GC,S$GLB

## 2023-12-22 PROCEDURE — 1126F AMNT PAIN NOTED NONE PRSNT: CPT | Mod: HCNC,CPTII,GC,S$GLB

## 2023-12-22 PROCEDURE — 1111F DSCHRG MED/CURRENT MED MERGE: CPT | Mod: HCNC,CPTII,GC,S$GLB

## 2023-12-22 PROCEDURE — 3074F SYST BP LT 130 MM HG: CPT | Mod: HCNC,CPTII,GC,S$GLB

## 2023-12-22 PROCEDURE — 1111F PR DISCHARGE MEDS RECONCILED W/ CURRENT OUTPATIENT MED LIST: ICD-10-PCS | Mod: HCNC,CPTII,GC,S$GLB

## 2023-12-22 PROCEDURE — 99214 OFFICE O/P EST MOD 30 MIN: CPT | Mod: HCNC,GC,S$GLB,

## 2023-12-22 PROCEDURE — 85025 COMPLETE CBC W/AUTO DIFF WBC: CPT | Mod: HCNC

## 2023-12-22 PROCEDURE — 3078F PR MOST RECENT DIASTOLIC BLOOD PRESSURE < 80 MM HG: ICD-10-PCS | Mod: HCNC,CPTII,GC,S$GLB

## 2023-12-22 PROCEDURE — 99999 PR PBB SHADOW E&M-EST. PATIENT-LVL II: ICD-10-PCS | Mod: PBBFAC,HCNC,GC,

## 2023-12-22 PROCEDURE — 99283 EMERGENCY DEPT VISIT LOW MDM: CPT | Mod: HCNC

## 2023-12-22 PROCEDURE — 1126F PR PAIN SEVERITY QUANTIFIED, NO PAIN PRESENT: ICD-10-PCS | Mod: HCNC,CPTII,GC,S$GLB

## 2023-12-22 PROCEDURE — 99214 PR OFFICE/OUTPT VISIT, EST, LEVL IV, 30-39 MIN: ICD-10-PCS | Mod: HCNC,GC,S$GLB,

## 2023-12-22 PROCEDURE — 85610 PROTHROMBIN TIME: CPT | Mod: HCNC

## 2023-12-22 PROCEDURE — 99999 PR PBB SHADOW E&M-EST. PATIENT-LVL II: CPT | Mod: PBBFAC,HCNC,GC,

## 2023-12-22 PROCEDURE — 3074F PR MOST RECENT SYSTOLIC BLOOD PRESSURE < 130 MM HG: ICD-10-PCS | Mod: HCNC,CPTII,GC,S$GLB

## 2023-12-22 NOTE — CONSULTS
[facility] - Emergency Dept  Otorhinolaryngology-Head & Neck Surgery  Consult Note    Patient Name: Dariel Urbina  MRN: [id number]  Code Status: Prior  Admission Date: 12/22/2023  Hospital Length of Stay: 0 days  Attending Physician: Yvrose Cedeño MD  Primary Care Provider: Devon Langston Jr., MD    Patient information was obtained from patient, spouse/SO, and ER records.     Inpatient consult to ENT  Consult performed by: Yesenia Cross MD  Consult ordered by: Laron Mendes MD        Subjective:     Chief Complaint/Reason for Admission: nose bleed     History of Present Illness: Dariel Urbina is a 83 y.o. male with past medical and surgical history as detailed below that presents to the hospital for epistaxis on 12/22/2023.    ENT consulted for evaluation given history of prior interventions. Patient states he was at his PCP's office earlier today and experienced a low volume nose bleed from the left nasal cavity that produced droplets of blood but has since resolved. No precipitating event, no recent nasal trauma. Resolved without any intervention. States he has not experienced any other nosebleeds recently, continues to use nasal saline daily and vaseline at nasal vestibule. When he does experience nosebleeds he uses Afrin.  Denies current PND, nasal bleeding, metallic taste, difficulty breathing/SOB.    Medications:  Continuous Infusions:  Scheduled Meds:  PRN Meds:     Current Facility-Administered Medications on File Prior to Encounter   Medication    ceFAZolin 2 g in dextrose 5 % in water (D5W) 50 mL IVPB (MB+)    HYDROmorphone injection 0.2 mg    sodium chloride 0.9% flush 10 mL     Current Outpatient Medications on File Prior to Encounter   Medication Sig    acetaminophen (TYLENOL) 500 MG tablet Take 500 mg by mouth daily as needed for Pain.    allopurinoL (ZYLOPRIM) 100 MG tablet Take 1 tablet (100 mg total) by mouth once daily.    bumetanide (BUMEX) 1 MG tablet Take 1 tablet (1 mg total) by mouth  every other day.    doxycycline (VIBRA-TABS) 100 MG tablet Take 1 tablet (100 mg total) by mouth every 12 (twelve) hours. for 10 days    ferrous gluconate (FERGON) 324 MG tablet Take 1 tablet (324 mg total) by mouth daily with breakfast.    isosorbide mononitrate (IMDUR) 60 MG 24 hr tablet Take 1 tablet (60 mg total) by mouth every evening.    metoprolol succinate (TOPROL-XL) 25 MG 24 hr tablet Take 1 tablet (25 mg total) by mouth once daily.    omega-3 fatty acids/fish oil (FISH OIL-OMEGA-3 FATTY ACIDS) 300-1,000 mg capsule Take 1 capsule by mouth once daily.    rosuvastatin (CRESTOR) 40 MG Tab TAKE 1 TABLET EVERY EVENING.    traZODone (DESYREL) 50 MG tablet Take 1 tablet (50 mg total) by mouth nightly as needed for Insomnia.    warfarin (COUMADIN) 5 MG tablet Take warfarin 7.5mg (1.5 tablets) PO Tuesday & Saturday, then take 5mg PO all other days or as instructed by Coumadin Clinic       Review of patient's allergies indicates:   Allergen Reactions    Lisinopril     Losartan      Intolerance- elevates potassium level       Past Medical History:   Diagnosis Date    Anemia of chronic renal failure, stage 3 (moderate) 05/27/2015    Anticoagulant long-term use     Atherosclerosis of coronary artery bypass graft of native heart without angina pectoris 09/11/2012    3-27-18 [facility] Two vessel coronary artery disease.   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    Bilateral carotid artery disease 02/09/2017    Bleeding from the nose     Bleeding nose 03/21/2018    Cancer     Cataract     CHF (congestive heart failure)     CKD (chronic kidney disease) stage 3, GFR 30-59 ml/min 05/27/2015    Claudication of left lower extremity 09/17/2014    Colon polyp     Encounter for blood transfusion     Gastroesophageal reflux disease without esophagitis 03/19/2018    Gastrointestinal hemorrhage associated with intestinal diverticulosis 04/01/2018    Glaucoma     H/O mechanical aortic valve replacement 09/17/2014     History of gout 09/26/2012    Hyperparathyroidism due to renal insufficiency 07/27/2015    Internal hemorrhoid 04/03/2018    Long term current use of anticoagulant therapy 09/26/2012    Mechanical heart valve present     Metabolic acidosis with normal anion gap and bicarbonate losses 03/20/2018    Mixed hyperlipidemia 09/26/2012    NSTEMI (non-ST elevated myocardial infarction) 03/21/2018    Obesity, diabetes, and hypertension syndrome 02/23/2016    Paroxysmal atrial fibrillation 02/06/2023    PVD (peripheral vascular disease) 09/11/2012    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     Past Surgical History:   Procedure Laterality Date    BACK SURGERY      CARDIAC CATHETERIZATION      CARDIAC VALVE REPLACEMENT      CARDIAC VALVE SURGERY      CARPAL TUNNEL RELEASE Right 05/19/2020    Procedure: RELEASE, CARPAL TUNNEL;  Surgeon: Rupesh Norris Jr., MD;  Location: [facility];  Service: Plastics;  Laterality: Right;    CATARACT EXTRACTION Left 11/13/2022        COLON SURGERY      COLONOSCOPY N/A 03/31/2017    Procedure: COLONOSCOPY;  Surgeon: Bruno Raymond MD;  Location: [facility] (4TH FLR);  Service: Endoscopy;  Laterality: N/A;  Patient's wife requesting date.    COLONOSCOPY N/A 04/03/2018    Procedure: COLONOSCOPY;  Surgeon: Bonifacio Pelletier MD;  Location: [facility] (2ND FLR);  Service: Endoscopy;  Laterality: N/A;    COLONOSCOPY N/A 08/13/2018    Procedure: COLONOSCOPY;  Surgeon: Kam Barba MD;  Location: [facility] ENDO (2ND FLR);  Service: Endoscopy;  Laterality: N/A;  2nd floor: PA pressure 49; hx of moderate-severe valve disease     per Coumadin clinic-Patient can hold 5 days with lovenox bridge       ok  to schedule per Katarina    CORONARY ANGIOGRAPHY N/A 10/04/2021    Procedure: Left heart cath +/- peripheral angiogram;  Surgeon: Jose Ruiz MD;  Location: [facility] CATH LAB;  Service: Cardiology;  Laterality: N/A;    CORONARY ANGIOGRAPHY N/A 3/2/2023    Procedure: Angiogram, Coronary;  Surgeon: Devon Garnica MD;  Location: [facility] CATH LAB;  Service: Cardiology;  Laterality: N/A;    CORONARY ANGIOPLASTY      CORONARY ARTERY BYPASS GRAFT      CORONARY BYPASS GRAFT ANGIOGRAPHY  10/04/2021    Procedure: Bypass graft study;  Surgeon: Jose Ruiz MD;  Location: [facility] CATH LAB;  Service: Cardiology;;    CORONARY BYPASS GRAFT ANGIOGRAPHY  3/2/2023    Procedure: Bypass graft study;  Surgeon: Devon Garnica MD;  Location: [facility] CATH LAB;  Service: Cardiology;;    ECHOCARDIOGRAM,TRANSESOPHAGEAL N/A 4/14/2023    Procedure: Transesophageal echo (KIRSTEN) intra-procedure log documentation;  Surgeon: [facility] Diagnostic Provider;  Location: [facility] EP LAB;  Service: Cardiology;  Laterality: N/A;    ENDOSCOPIC NASAL CAUTERIZATION Bilateral 11/3/2023    Procedure: CAUTERIZATION, NOSE, ENDOSCOPIC;  Surgeon: Jono Russell MD;  Location: [facility] OR 2ND FLR;  Service: ENT;  Laterality: Bilateral;    ENDOSCOPIC NASAL CAUTERIZATION N/A 12/18/2023    Procedure: CAUTERIZATION, NOSE, ENDOSCOPIC;  Surgeon: Jono Russell MD;  Location: [facility] OR 2ND FLR;  Service: ENT;  Laterality: N/A;    EYE SURGERY      FUNCTIONAL ENDOSCOPIC SINUS SURGERY (FESS) Bilateral 6/14/2023    Procedure: FESS (FUNCTIONAL ENDOSCOPIC SINUS SURGERY);  Surgeon: Jono Russell MD;  Location: [facility] OR 2ND FLR;  Service: ENT;  Laterality: Bilateral;    HERNIA REPAIR      INTRAOCULAR PROSTHESES INSERTION Left 11/13/2022    Procedure: INSERTION, IOL PROSTHESIS;  Surgeon: Alia Mckeon MD;  Location: [facility] OR 1ST FLR;  Service: Ophthalmology;  Laterality: Left;    INTRAOCULAR PROSTHESES INSERTION Right 12/04/2022    Procedure: INSERTION, IOL PROSTHESIS;  Surgeon: Alia BANG  MD Mike;  Location: [facility] OR 1ST FLR;  Service: Ophthalmology;  Laterality: Right;    LIGATION, ARTERY, SPHENOPALATINE, ENDOSCOPIC Bilateral 2023    Procedure: LIGATION, ARTERY, SPHENOPALATINE, ENDOSCOPIC;  Surgeon: Jono Russell MD;  Location: [facility] OR 2ND FLR;  Service: ENT;  Laterality: Bilateral;    PHACOEMULSIFICATION OF CATARACT Left 2022    Procedure: PHACOEMULSIFICATION, CATARACT;  Surgeon: Alia Mckeon MD;  Location: [facility] OR 1ST FLR;  Service: Ophthalmology;  Laterality: Left;    PHACOEMULSIFICATION OF CATARACT Right 2022    Procedure: PHACOEMULSIFICATION, CATARACT;  Surgeon: Alia Mckeon MD;  [facility];  Service: Ophthalmology;  Laterality: Right;    SPINE SURGERY      TRANSESOPHAGEAL ECHOCARDIOGRAPHY N/A 3/22/2023    Procedure: ECHOCARDIOGRAM, TRANSESOPHAGEAL;  Surgeon: Dos Diagnostic Provider;  Location: [facility] EP LAB;  Service: Anesthesiology;  Laterality: N/A;    TRANSESOPHAGEAL ECHOCARDIOGRAPHY N/A 2023    Procedure: ECHOCARDIOGRAM, TRANSESOPHAGEAL;  Surgeon: Dosc Diagnostic Provider;  Location: [facility] EP LAB;  Service: Anesthesiology;  Laterality: N/A;    VASECTOMY       Family History       Problem Relation (Age of Onset)    Alcohol abuse Father    Diabetes Brother    Heart disease Mother, Father, Brother, Sister    Heart failure Mother, Father, Brother    No Known Problems Sister, Maternal Grandmother, Maternal Grandfather, Paternal Grandmother, Paternal Grandfather, Maternal Aunt, Maternal Uncle, Paternal Aunt, Paternal Uncle          Tobacco Use    Smoking status: Former     Current packs/day: 0.00     Average packs/day: 1 pack/day for 20.0 years (20.0 ttl pk-yrs)     Types: Cigarettes     Start date: 1960     Quit date: 1980     Years since quittin.3     Passive exposure: Never    Smokeless tobacco: Never   Substance and Sexual Activity    Alcohol use: No    Drug use: No    Sexual activity: Not Currently     Partners: Female      Review of Systems   Constitutional:  Negative for chills, fever and unexpected weight change.   HENT:  Positive for nosebleeds. Negative for congestion, drooling, facial swelling, postnasal drip, rhinorrhea, sinus pressure, sinus pain, sneezing, sore throat, trouble swallowing and voice change.    Eyes:  Negative for visual disturbance.   Respiratory:  Negative for choking, shortness of breath and stridor.    Gastrointestinal:  Negative for nausea and vomiting.     Objective:     Vital Signs (Most Recent):  Temp: 97.6 °F (36.4 °C) (12/22/23 1428)  Pulse: 76 (12/22/23 1428)  Resp: 16 (12/22/23 1428)  BP: (!) 161/70 (12/22/23 1428)  SpO2: 98 % (12/22/23 1428) Vital Signs (24h Range):  Temp:  [97.6 °F (36.4 °C)] 97.6 °F (36.4 °C)  Pulse:  [76-89] 76  Resp:  [15-16] 16  SpO2:  [98 %] 98 %  BP: (128-161)/(58-70) 161/70     Weight: 78.5 kg (173 lb)  Body mass index is 27.92 kg/m².         Physical Exam  NAD  Awake and alert, sitting up in hospital bed   Nose w/ normal external appearance, dry old bloody crusting around left>right nares, septum with perforation and dry bloody crusting surrounding perforation edges but no active epistaxis   Neck soft, normal ROM  Normal WOB, no stridor or stertor        Assessment/Plan:     Epistaxis  Dariel Urbina is a 83 y.o. male sent to ED from PCP office for acute epistaxis on 12/22/2023. ENT consulted for evaluation given history of recurrent epistaxis/need for intervention. Upon evaluation patient with spontaneous resolution of epistaxis, remains hemostatic without need for intervention.     -- Discussed return precautions and nasal hygiene regimen with patient (e.g., cont nasal saline, vaseline; afrin prn for nasal bleeding) -- pt and spouse at bedside voice understanding   -- Will arrange f/u with Dr. Russell as outpatient   -- Remainder of care per ED   -- Please page ENT on call with any additional questions or concerns         VTE Risk Mitigation (From admission,  onward)      None            Thank you for your consult. I will follow-up with patient. Please contact us if you have any additional questions.    Yesenia Cross MD  Otorhinolaryngology-Head & Neck Surgery  [facility] - Emergency Dept

## 2023-12-22 NOTE — ED NOTES
Patient identifiers verified and correct for Dariel Urbina.  LOC: The patient is awake, alert and aware of environment with an appropriate affect, the patient is oriented x 3 and speaking appropriately.   APPEARANCE: Patient appears comfortable and in no acute distress, patient is clean and well groomed.  SKIN: The skin is warm and dry, color consistent with ethnicity, patient has normal skin turgor and moist mucus membranes, skin intact, no breakdown or bruising noted.   MUSCULOSKELETAL: Patient moving all extremities spontaneously, no swelling noted.  RESPIRATORY: Airway is open and patent, respirations are spontaneous, patient has a normal effort and rate, no accessory muscle use noted, O2 sats noted at 98% on room air. Crusted blood around left nare from nose bleed this afternoon. No active bleeding at this time.  CARDIAC: Patient has a normal rate and regular rhythm, no edema noted, capillary refill < 3 seconds.   GASTRO: Soft and non tender to palpation, no distention noted, normoactive bowel sounds present in all four quadrants. Pt states bowel movements have been regular.  : Pt denies any pain or frequency with urination.  NEURO: Pt opens eyes spontaneously, behavior appropriate to situation, follows commands, facial expression symmetrical, bilateral hand grasp equal and even, purposeful motor response noted, normal sensation in all extremities when touched with a finger.

## 2023-12-22 NOTE — SUBJECTIVE & OBJECTIVE
Medications:  Continuous Infusions:  Scheduled Meds:  PRN Meds:     Current Facility-Administered Medications on File Prior to Encounter   Medication    ceFAZolin 2 g in dextrose 5 % in water (D5W) 50 mL IVPB (MB+)    HYDROmorphone injection 0.2 mg    sodium chloride 0.9% flush 10 mL     Current Outpatient Medications on File Prior to Encounter   Medication Sig    acetaminophen (TYLENOL) 500 MG tablet Take 500 mg by mouth daily as needed for Pain.    allopurinoL (ZYLOPRIM) 100 MG tablet Take 1 tablet (100 mg total) by mouth once daily.    bumetanide (BUMEX) 1 MG tablet Take 1 tablet (1 mg total) by mouth every other day.    doxycycline (VIBRA-TABS) 100 MG tablet Take 1 tablet (100 mg total) by mouth every 12 (twelve) hours. for 10 days    ferrous gluconate (FERGON) 324 MG tablet Take 1 tablet (324 mg total) by mouth daily with breakfast.    isosorbide mononitrate (IMDUR) 60 MG 24 hr tablet Take 1 tablet (60 mg total) by mouth every evening.    metoprolol succinate (TOPROL-XL) 25 MG 24 hr tablet Take 1 tablet (25 mg total) by mouth once daily.    omega-3 fatty acids/fish oil (FISH OIL-OMEGA-3 FATTY ACIDS) 300-1,000 mg capsule Take 1 capsule by mouth once daily.    rosuvastatin (CRESTOR) 40 MG Tab TAKE 1 TABLET EVERY EVENING.    traZODone (DESYREL) 50 MG tablet Take 1 tablet (50 mg total) by mouth nightly as needed for Insomnia.    warfarin (COUMADIN) 5 MG tablet Take warfarin 7.5mg (1.5 tablets) PO Tuesday & Saturday, then take 5mg PO all other days or as instructed by [facility]       Review of patient's allergies indicates:   Allergen Reactions    Lisinopril     Losartan      Intolerance- elevates potassium level       Past Medical History:   Diagnosis Date    Anemia of chronic renal failure, stage 3 (moderate) 05/27/2015    Anticoagulant long-term use     Atherosclerosis of coronary artery bypass graft of native heart without angina pectoris 09/11/2012    3-27-18 [facility] Two vessel coronary artery disease.    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    Bilateral carotid artery disease 02/09/2017    Bleeding from the nose     Bleeding nose 03/21/2018    Cancer     Cataract     CHF (congestive heart failure)     CKD (chronic kidney disease) stage 3, GFR 30-59 ml/min 05/27/2015    Claudication of left lower extremity 09/17/2014    Colon polyp     Encounter for blood transfusion     Gastroesophageal reflux disease without esophagitis 03/19/2018    Gastrointestinal hemorrhage associated with intestinal diverticulosis 04/01/2018    Glaucoma     H/O mechanical aortic valve replacement 09/17/2014    History of gout 09/26/2012    Hyperparathyroidism due to renal insufficiency 07/27/2015    Internal hemorrhoid 04/03/2018    Long term current use of anticoagulant therapy 09/26/2012    Mechanical heart valve present     Metabolic acidosis with normal anion gap and bicarbonate losses 03/20/2018    Mixed hyperlipidemia 09/26/2012    NSTEMI (non-ST elevated myocardial infarction) 03/21/2018    Obesity, diabetes, and hypertension syndrome 02/23/2016    Paroxysmal atrial fibrillation 02/06/2023    PVD (peripheral vascular disease) 09/11/2012    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     Past Surgical History:   Procedure Laterality Date    BACK SURGERY      CARDIAC CATHETERIZATION      CARDIAC VALVE REPLACEMENT      CARDIAC VALVE SURGERY      CARPAL TUNNEL RELEASE Right 05/19/2020    Procedure: RELEASE, CARPAL TUNNEL;  Surgeon: Rupesh Norris Jr., MD;  Location: [facility];  Service: Plastics;  Laterality: Right;    CATARACT EXTRACTION Left 11/13/2022        COLON SURGERY      COLONOSCOPY N/A 03/31/2017     Procedure: COLONOSCOPY;  Surgeon: Bruno Raymond MD;  Location: [facility] (4TH FLR);  Service: Endoscopy;  Laterality: N/A;  Patient's wife requesting date.    COLONOSCOPY N/A 04/03/2018    Procedure: COLONOSCOPY;  Surgeon: Bonifacio Pelletier MD;  Location: [facility] ENDO (2ND FLR);  Service: Endoscopy;  Laterality: N/A;    COLONOSCOPY N/A 08/13/2018    Procedure: COLONOSCOPY;  Surgeon: Kam Barba MD;  Location: [facility] ENDO (2ND FLR);  Service: Endoscopy;  Laterality: N/A;  2nd floor: PA pressure 49; hx of moderate-severe valve disease     per Coumadin clinic-Patient can hold 5 days with lovenox bridge       ok to schedule per Katarina    CORONARY ANGIOGRAPHY N/A 10/04/2021    Procedure: Left heart cath +/- peripheral angiogram;  Surgeon: Jose Ruiz MD;  Location: [facility] CATH LAB;  Service: Cardiology;  Laterality: N/A;    CORONARY ANGIOGRAPHY N/A 3/2/2023    Procedure: Angiogram, Coronary;  Surgeon: Devon Garnica MD;  Location: [facility] CATH LAB;  Service: Cardiology;  Laterality: N/A;    CORONARY ANGIOPLASTY      CORONARY ARTERY BYPASS GRAFT      CORONARY BYPASS GRAFT ANGIOGRAPHY  10/04/2021    Procedure: Bypass graft study;  Surgeon: Jose Ruiz MD;  Location: [facility] CATH LAB;  Service: Cardiology;;    CORONARY BYPASS GRAFT ANGIOGRAPHY  3/2/2023    Procedure: Bypass graft study;  Surgeon: Devon Garnica MD;  Location: [facility] CATH LAB;  Service: Cardiology;;    ECHOCARDIOGRAM,TRANSESOPHAGEAL N/A 4/14/2023    Procedure: Transesophageal echo (KIRSTEN) intra-procedure log documentation;  Surgeon: [facility] Diagnostic Provider;  Location: [facility] EP LAB;  Service: Cardiology;  Laterality: N/A;    ENDOSCOPIC NASAL CAUTERIZATION Bilateral 11/3/2023    Procedure: CAUTERIZATION, NOSE, ENDOSCOPIC;  Surgeon: Jono Russell MD;  Location: [facility] OR 2ND FLR;  Service: ENT;  Laterality: Bilateral;    ENDOSCOPIC NASAL CAUTERIZATION N/A 12/18/2023    Procedure: CAUTERIZATION, NOSE, ENDOSCOPIC;  Surgeon: Jono Russell MD;  Location:  [facility] OR 2ND FLR;  Service: ENT;  Laterality: N/A;    EYE SURGERY      FUNCTIONAL ENDOSCOPIC SINUS SURGERY (FESS) Bilateral 6/14/2023    Procedure: FESS (FUNCTIONAL ENDOSCOPIC SINUS SURGERY);  Surgeon: Jono Russell MD;  Location: [facility] OR 2ND FLR;  Service: ENT;  Laterality: Bilateral;    HERNIA REPAIR      INTRAOCULAR PROSTHESES INSERTION Left 11/13/2022    Procedure: INSERTION, IOL PROSTHESIS;  Surgeon: Alia Mckeon MD;  Location: [facility] OR 1ST FLR;  Service: Ophthalmology;  Laterality: Left;    INTRAOCULAR PROSTHESES INSERTION Right 12/04/2022    Procedure: INSERTION, IOL PROSTHESIS;  Surgeon: Alia Mckeon MD;  Location: [facility] OR 1ST FLR;  Service: Ophthalmology;  Laterality: Right;    LIGATION, ARTERY, SPHENOPALATINE, ENDOSCOPIC Bilateral 6/14/2023    Procedure: LIGATION, ARTERY, SPHENOPALATINE, ENDOSCOPIC;  Surgeon: Jono Russell MD;  Location: [facility] OR 2ND FLR;  Service: ENT;  Laterality: Bilateral;    PHACOEMULSIFICATION OF CATARACT Left 11/13/2022    Procedure: PHACOEMULSIFICATION, CATARACT;  Surgeon: Alia Mckeon MD;  [facility];  Service: Ophthalmology;  Laterality: Left;    PHACOEMULSIFICATION OF CATARACT Right 12/04/2022    Procedure: PHACOEMULSIFICATION, CATARACT;  Surgeon: Alia Mckeon MD;  [facility];  Service: Ophthalmology;  Laterality: Right;    SPINE SURGERY      TRANSESOPHAGEAL ECHOCARDIOGRAPHY N/A 3/22/2023    Procedure: ECHOCARDIOGRAM, TRANSESOPHAGEAL;  Surgeon: [facility] Diagnostic Provider;  Location: [facility] EP LAB;  Service: Anesthesiology;  Laterality: N/A;    TRANSESOPHAGEAL ECHOCARDIOGRAPHY N/A 4/11/2023    Procedure: ECHOCARDIOGRAM, TRANSESOPHAGEAL;  Surgeon: [facility] Diagnostic Provider;  Location: [facility] EP LAB;  Service: Anesthesiology;  Laterality: N/A;    VASECTOMY       Family History       Problem Relation (Age of Onset)    Alcohol abuse Father    Diabetes Brother    Heart disease Mother, Father, Brother, Sister    Heart failure Mother,  Father, Brother    No Known Problems Sister, Maternal Grandmother, Maternal Grandfather, Paternal Grandmother, Paternal Grandfather, Maternal Aunt, Maternal Uncle, Paternal Aunt, Paternal Uncle          Tobacco Use    Smoking status: Former     Current packs/day: 0.00     Average packs/day: 1 pack/day for 20.0 years (20.0 ttl pk-yrs)     Types: Cigarettes     Start date: 1960     Quit date: 1980     Years since quittin.3     Passive exposure: Never    Smokeless tobacco: Never   Substance and Sexual Activity    Alcohol use: No    Drug use: No    Sexual activity: Not Currently     Partners: Female     Review of Systems   Constitutional:  Negative for chills, fever and unexpected weight change.   HENT:  Positive for nosebleeds. Negative for congestion, drooling, facial swelling, postnasal drip, rhinorrhea, sinus pressure, sinus pain, sneezing, sore throat, trouble swallowing and voice change.    Eyes:  Negative for visual disturbance.   Respiratory:  Negative for choking, shortness of breath and stridor.    Gastrointestinal:  Negative for nausea and vomiting.     Objective:     Vital Signs (Most Recent):  Temp: 97.6 °F (36.4 °C) (23 1428)  Pulse: 76 (23 1428)  Resp: 16 (23 1428)  BP: (!) 161/70 (23 1428)  SpO2: 98 % (23 142) Vital Signs (24h Range):  Temp:  [97.6 °F (36.4 °C)] 97.6 °F (36.4 °C)  Pulse:  [76-89] 76  Resp:  [15-16] 16  SpO2:  [98 %] 98 %  BP: (128-161)/(58-70) 161/70     Weight: 78.5 kg (173 lb)  Body mass index is 27.92 kg/m².         Physical Exam  NAD  Awake and alert, sitting up in hospital bed   Nose w/ normal external appearance, dry old bloody crusting around left>right nares, septum with perforation and dry bloody crusting surrounding perforation edges but no active epistaxis   Neck soft, normal ROM  Normal WOB, no stridor or stertor

## 2023-12-22 NOTE — ASSESSMENT & PLAN NOTE
Dariel Urbina is a 83 y.o. male sent to ED from PCP office for acute epistaxis on 12/22/2023. ENT consulted for evaluation given history of recurrent epistaxis/need for intervention. Upon evaluation patient with spontaneous resolution of epistaxis, remains hemostatic without need for intervention.     -- Discussed return precautions and nasal hygiene regimen with patient (e.g., cont nasal saline, vaseline; afrin prn for nasal bleeding) -- pt and spouse at bedside voice understanding   -- Will arrange f/u with Dr. Russell as outpatient   -- Remainder of care per ED   -- Please page ENT on call with any additional questions or concerns

## 2023-12-22 NOTE — ED PROVIDER NOTES
Encounter Date: 12/22/2023       History     Chief Complaint   Patient presents with    Epistaxis     Had it cauterized last week having nose bleeding again, tissue soaked in blood     83-year-old male who presents to the ED for chief complaint of epistaxis that started today around 1:00 p.m. while visiting the doctor.  His wife is at bedside.  Patient states he had nose bleeding while visiting his primary care doctor and was referred to come to the emergency department.  He noted that he had tissue soaked in blood and a couple of drops that fell onto his shirt.  Then his nose bleeding stopped.  He denies lightheadedness, dizziness, nausea, or nose pain.  He denies any recent falls or trauma to the face.  Of note, patient has had his nose cauterized multiple times for epistaxis.  His last cauterization was on 12/18.    The history is provided by the patient and the spouse. No  was used.     Review of patient's allergies indicates:   Allergen Reactions    Lisinopril     Losartan      Intolerance- elevates potassium level     Past Medical History:   Diagnosis Date    Anemia of chronic renal failure, stage 3 (moderate) 05/27/2015    Anticoagulant long-term use     Atherosclerosis of coronary artery bypass graft of native heart without angina pectoris 09/11/2012    3-27-18 [facility] Two vessel coronary artery disease.   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    Bilateral carotid artery disease 02/09/2017    Bleeding from the nose     Bleeding nose 03/21/2018    Cancer     Cataract     CHF (congestive heart failure)     CKD (chronic kidney disease) stage 3, GFR 30-59 ml/min 05/27/2015    Claudication of left lower extremity 09/17/2014    Colon polyp     Encounter for blood transfusion     Gastroesophageal reflux disease without esophagitis 03/19/2018    Gastrointestinal hemorrhage associated with intestinal diverticulosis 04/01/2018    Glaucoma     H/O mechanical aortic valve  replacement 09/17/2014    History of gout 09/26/2012    Hyperparathyroidism due to renal insufficiency 07/27/2015    Internal hemorrhoid 04/03/2018    Long term current use of anticoagulant therapy 09/26/2012    Mechanical heart valve present     Metabolic acidosis with normal anion gap and bicarbonate losses 03/20/2018    Mixed hyperlipidemia 09/26/2012    NSTEMI (non-ST elevated myocardial infarction) 03/21/2018    Obesity, diabetes, and hypertension syndrome 02/23/2016    Paroxysmal atrial fibrillation 02/06/2023    PVD (peripheral vascular disease) 09/11/2012    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     Past Surgical History:   Procedure Laterality Date    BACK SURGERY      CARDIAC CATHETERIZATION      CARDIAC VALVE REPLACEMENT      CARDIAC VALVE SURGERY      CARPAL TUNNEL RELEASE Right 05/19/2020    Procedure: RELEASE, CARPAL TUNNEL;  Surgeon: Rupesh Norris Jr., MD;  Location: [facility];  Service: Plastics;  Laterality: Right;    CATARACT EXTRACTION Left 11/13/2022        COLON SURGERY      COLONOSCOPY N/A 03/31/2017    Procedure: COLONOSCOPY;  Surgeon: Bruno Raymond MD;  Location: [facility] (4TH FLR);  Service: Endoscopy;  Laterality: N/A;  Patient's wife requesting date.    COLONOSCOPY N/A 04/03/2018    Procedure: COLONOSCOPY;  Surgeon: Bonifacio Pelletier MD;  Location: [facility] ENDO (2ND FLR);  Service: Endoscopy;  Laterality: N/A;    COLONOSCOPY N/A 08/13/2018    Procedure: COLONOSCOPY;  Surgeon: Kam Barba MD;  Location: [facility] ENDO (2ND FLR);  Service: Endoscopy;  Laterality: N/A;  2nd floor: PA pressure 49; hx of moderate-severe valve disease     per Coumadin clinic-Patient can hold 5 days with  lovenox bridge       ok to schedule per Katarina    CORONARY ANGIOGRAPHY N/A 10/04/2021    Procedure: Left heart cath +/- peripheral angiogram;  Surgeon: Jose Ruiz MD;  Location: [facility] CATH LAB;  Service: Cardiology;  Laterality: N/A;    CORONARY ANGIOGRAPHY N/A 3/2/2023    Procedure: Angiogram, Coronary;  Surgeon: Devon Garnica MD;  Location: [facility] CATH LAB;  Service: Cardiology;  Laterality: N/A;    CORONARY ANGIOPLASTY      CORONARY ARTERY BYPASS GRAFT      CORONARY BYPASS GRAFT ANGIOGRAPHY  10/04/2021    Procedure: Bypass graft study;  Surgeon: Jose Ruiz MD;  Location: [facility] CATH LAB;  Service: Cardiology;;    CORONARY BYPASS GRAFT ANGIOGRAPHY  3/2/2023    Procedure: Bypass graft study;  Surgeon: Devon Garnica MD;  Location: [facility] CATH LAB;  Service: Cardiology;;    ECHOCARDIOGRAM,TRANSESOPHAGEAL N/A 4/14/2023    Procedure: Transesophageal echo (KIRSTEN) intra-procedure log documentation;  Surgeon: [facility] Diagnostic Provider;  Location: [facility] EP LAB;  Service: Cardiology;  Laterality: N/A;    ENDOSCOPIC NASAL CAUTERIZATION Bilateral 11/3/2023    Procedure: CAUTERIZATION, NOSE, ENDOSCOPIC;  Surgeon: Jono Russell MD;  Location: [facility] OR 2ND FLR;  Service: ENT;  Laterality: Bilateral;    ENDOSCOPIC NASAL CAUTERIZATION N/A 12/18/2023    Procedure: CAUTERIZATION, NOSE, ENDOSCOPIC;  Surgeon: Jono Russell MD;  Location: [facility] OR 2ND FLR;  Service: ENT;  Laterality: N/A;    EYE SURGERY      FUNCTIONAL ENDOSCOPIC SINUS SURGERY (FESS) Bilateral 6/14/2023    Procedure: FESS (FUNCTIONAL ENDOSCOPIC SINUS SURGERY);  Surgeon: Jono Russell MD;  Location: [facility] OR 2ND FLR;  Service: ENT;  Laterality: Bilateral;    HERNIA REPAIR      INTRAOCULAR PROSTHESES INSERTION Left 11/13/2022    Procedure: INSERTION, IOL PROSTHESIS;  Surgeon: Alia Mckeon MD;  Location: [facility] OR 1ST FLR;  Service: Ophthalmology;  Laterality: Left;    INTRAOCULAR PROSTHESES INSERTION Right 12/04/2022    Procedure: INSERTION, IOL  PROSTHESIS;  Surgeon: Alia Mckeon MD;  [facility];  Service: Ophthalmology;  Laterality: Right;    LIGATION, ARTERY, SPHENOPALATINE, ENDOSCOPIC Bilateral 6/14/2023    Procedure: LIGATION, ARTERY, SPHENOPALATINE, ENDOSCOPIC;  Surgeon: Jono Russell MD;  Location: [facility] OR 2ND FLR;  Service: ENT;  Laterality: Bilateral;    PHACOEMULSIFICATION OF CATARACT Left 11/13/2022    Procedure: PHACOEMULSIFICATION, CATARACT;  Surgeon: Alia Mckeon MD;  Location: [facility] OR 1ST FLR;  Service: Ophthalmology;  Laterality: Left;    PHACOEMULSIFICATION OF CATARACT Right 12/04/2022    Procedure: PHACOEMULSIFICATION, CATARACT;  Surgeon: Alia Mckeon MD;  Location: [facility] OR [address] [place] [address];  Service: Ophthalmology;  Laterality: Right;    SPINE SURGERY      TRANSESOPHAGEAL ECHOCARDIOGRAPHY N/A 3/22/2023    Procedure: ECHOCARDIOGRAM, TRANSESOPHAGEAL;  Surgeon: [facility] Diagnostic Provider;  Location: [facility] EP LAB;  Service: Anesthesiology;  Laterality: N/A;    TRANSESOPHAGEAL ECHOCARDIOGRAPHY N/A 4/11/2023    Procedure: ECHOCARDIOGRAM, TRANSESOPHAGEAL;  Surgeon: [facility] Diagnostic Provider;  Location: [facility] EP LAB;  Service: Anesthesiology;  Laterality: N/A;    VASECTOMY       Family History   Problem Relation Age of Onset    Heart failure Mother     Heart disease Mother     Heart failure Father     Heart disease Father     Alcohol abuse Father     Heart failure Brother     Heart disease Brother     Diabetes Brother     No Known Problems Sister     No Known Problems Maternal Grandmother     No Known Problems Maternal Grandfather     No Known Problems Paternal Grandmother     No Known Problems Paternal Grandfather     Heart disease Sister     No Known Problems Maternal Aunt     No Known Problems Maternal Uncle     No Known Problems Paternal Aunt     No Known Problems Paternal Uncle     Amblyopia Neg Hx     Blindness Neg Hx     Cancer Neg Hx     Cataracts Neg Hx     Glaucoma Neg Hx     Hypertension Neg Hx     Macular  degeneration Neg Hx     Retinal detachment Neg Hx     Strabismus Neg Hx     Stroke Neg Hx     Thyroid disease Neg Hx     Anemia Neg Hx     Arrhythmia Neg Hx     Asthma Neg Hx     Clotting disorder Neg Hx     Fainting Neg Hx     Heart attack Neg Hx     Hyperlipidemia Neg Hx     Atrial Septal Defect Neg Hx     Melanoma Neg Hx      Social History     Tobacco Use    Smoking status: Former     Current packs/day: 0.00     Average packs/day: 1 pack/day for 20.0 years (20.0 ttl pk-yrs)     Types: Cigarettes     Start date: 1960     Quit date: 1980     Years since quittin.3     Passive exposure: Never    Smokeless tobacco: Never   Substance Use Topics    Alcohol use: No    Drug use: No     Review of Systems    Physical Exam     Initial Vitals [23 1428]   BP Pulse Resp Temp SpO2   (!) 161/70 76 16 97.6 °F (36.4 °C) 98 %      MAP       --         Physical Exam    Nursing note and vitals reviewed.  Constitutional: He appears well-developed. No distress.   HENT:   Head: Normocephalic.   Mouth/Throat: Oropharynx is clear and moist.   No active epistaxis.  Dried blood in the nares.  Appears to be a perforation in the left naris in the mucosa.   Eyes: Conjunctivae are normal. No scleral icterus.   Neck:   Normal range of motion.  Cardiovascular:  Normal rate, regular rhythm and normal heart sounds.           Pulmonary/Chest: Breath sounds normal. No respiratory distress. He has no wheezes. He has no rhonchi. He has no rales.   Abdominal: Abdomen is soft. He exhibits no distension. There is no abdominal tenderness. There is no rebound and no guarding.   Musculoskeletal:         General: Normal range of motion.      Cervical back: Normal range of motion.     Neurological: He is alert.   Skin: Skin is warm. Capillary refill takes less than 2 seconds.   Psychiatric: He has a normal mood and affect.         ED Course   Procedures  Labs Reviewed   CBC W/ AUTO DIFFERENTIAL - Abnormal; Notable for the following  components:       Result Value    RBC 2.74 (*)     Hemoglobin 8.4 (*)     Hematocrit 26.2 (*)     RDW 14.6 (*)     All other components within normal limits   PROTIME-INR - Abnormal; Notable for the following components:    Prothrombin Time 17.9 (*)     INR 1.7 (*)     All other components within normal limits          Imaging Results    None          Medications - No data to display  Medical Decision Making  83-year-old male who presents with epistaxis that started today.  Recent cauterization 4 days ago.  There is no active bleeding.  He denies lightheadedness, dizziness, nausea, or nose pain.  He is hypertensive, other vitals WNL.  On exam, he has dried blood in the nares and a perforation in the left naris.  Please see physical exam findings above for additional details.  Differential diagnoses include but are not limited to epistaxis, postprocedural bleeding, nasal trauma.  Obtained labs.  Hemoglobin and hematocrit are at baseline, comparable to prior labs.  INR is at baseline, patient takes Coumadin.  Consulted and discussed patient with ENT team.    Discussed with patient to follow nasal hygiene regimen - continue nasal saline, vaseline; afrin prn for nasal bleeding.  Discussed follow-up with primary care provider and with ENT provider.  Discussed precautions for when to return to the emergency department.  I answered the patient's remaining questions.  Patient was discharged to home.    Amount and/or Complexity of Data Reviewed  Labs: ordered.               ED Course as of 12/23/23 0557   Fri Dec 22, 2023   1641 Consulted and discussed patient with ENT team.  ENT team will come to evaluate patient at bedside. [MD]      ED Course User Index  [MD] Laron Mendes MD                           Clinical Impression:  Final diagnoses:  [R04.0] Epistaxis (Primary)          ED Disposition Condition    Discharge Stable          ED Prescriptions    None       Follow-up Information       Follow up With Specialties Details  Why Contact Info    Devon Langston Jr., MD Internal Medicine Go in 1 week  [address]  [place] [address]  [phone]               [place]augh[place], MD Laron  Resident  12/23/23 0606

## 2023-12-22 NOTE — PROGRESS NOTES
Clinic Note  12/22/2023      Subjective:       Patient ID:  Dariel is a 83 y.o. male being seen for hospital discharge follow-up.  Patient was admitted to [facility] 12/15 for recurrent nosebleeds.  Patient has had chronic epistaxis for the past 20 years and several cauterizations with ENT with the last (prior to most recent admission) being on the 3rd of November.  Patient reports that he was having a bad nosebleed and started to feel dyspneic so he decided to come to the ED where they found that his hemoglobin was 6.9.  She was transfused 1 unit of blood and later underwent cauterization with ENT on 12/18 to no complications.  While in the hospital patient was also found to have cultures positive for staph aureus.  He was sent home with a 10 day course of doxycycline (which he has been compliant with) and no other changes to his other medications.  Of note, patient is warfarin for a mechanical aortic valve.  Per cardio, patient can not decrease his dosage of warfarin.  Most recent INR taken on 12/19 was 1.7.      While in clinic today patient's nose started bleeding profusely.  He states this is the 1st time in his bleeding since undergoing the cauterization on the 18th.  States that he usually takes Afrin to help with the bleeding, but did not bring to clinic with him today because he had been experiencing any any episodes.  Patient denies any pain with bleeding.  He does endorse shortness of breath but admits that he is dyspneic at baseline.  Denies any chest pain, palpitations, fever, cough, wheezing,  N/V, hematemesis,melena, dizziness or weakness.            Past Medical History:   Diagnosis Date    Anemia of chronic renal failure, stage 3 (moderate) 05/27/2015    Anticoagulant long-term use     Atherosclerosis of coronary artery bypass graft of native heart without angina pectoris 09/11/2012    3-27-18 [facility] Two vessel coronary artery disease.   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     Bilateral carotid artery disease 02/09/2017    Bleeding from the nose     Bleeding nose 03/21/2018    Cancer     Cataract     CHF (congestive heart failure)     CKD (chronic kidney disease) stage 3, GFR 30-59 ml/min 05/27/2015    Claudication of left lower extremity 09/17/2014    Colon polyp     Encounter for blood transfusion     Gastroesophageal reflux disease without esophagitis 03/19/2018    Gastrointestinal hemorrhage associated with intestinal diverticulosis 04/01/2018    Glaucoma     H/O mechanical aortic valve replacement 09/17/2014    History of gout 09/26/2012    Hyperparathyroidism due to renal insufficiency 07/27/2015    Internal hemorrhoid 04/03/2018    Long term current use of anticoagulant therapy 09/26/2012    Mechanical heart valve present     Metabolic acidosis with normal anion gap and bicarbonate losses 03/20/2018    Mixed hyperlipidemia 09/26/2012    NSTEMI (non-ST elevated myocardial infarction) 03/21/2018    Obesity, diabetes, and hypertension syndrome 02/23/2016    Paroxysmal atrial fibrillation 02/06/2023    PVD (peripheral vascular disease) 09/11/2012    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       Past Surgical History:   Procedure Laterality Date    BACK SURGERY      CARDIAC CATHETERIZATION      CARDIAC VALVE REPLACEMENT      CARDIAC VALVE SURGERY      CARPAL TUNNEL RELEASE Right 05/19/2020    Procedure: RELEASE, CARPAL TUNNEL;  Surgeon: Rupesh Norris Jr., MD;  Location: [facility];  Service: Plastics;  Laterality: Right;    CATARACT EXTRACTION Left 11/13/2022        COLON SURGERY      COLONOSCOPY N/A 03/31/2017    Procedure: COLONOSCOPY;  Surgeon: Bruno Raymond MD;  Location:  [facility] ENDO (4TH FLR);  Service: Endoscopy;  Laterality: N/A;  Patient's wife requesting date.    COLONOSCOPY N/A 04/03/2018    Procedure: COLONOSCOPY;  Surgeon: Bonifacio Pelletier MD;  Location: [facility] ENDO (2ND FLR);  Service: Endoscopy;  Laterality: N/A;    COLONOSCOPY N/A 08/13/2018    Procedure: COLONOSCOPY;  Surgeon: Kam Barba MD;  Location: [facility] ENDO (2ND FLR);  Service: Endoscopy;  Laterality: N/A;  2nd floor: PA pressure 49; hx of moderate-severe valve disease     per Coumadin clinic-Patient can hold 5 days with lovenox bridge       ok to schedule per Katarina    CORONARY ANGIOGRAPHY N/A 10/04/2021    Procedure: Left heart cath +/- peripheral angiogram;  Surgeon: Jose Ruiz MD;  Location: [facility] CATH LAB;  Service: Cardiology;  Laterality: N/A;    CORONARY ANGIOGRAPHY N/A 3/2/2023    Procedure: Angiogram, Coronary;  Surgeon: Devon Garnica MD;  Location: [facility] CATH LAB;  Service: Cardiology;  Laterality: N/A;    CORONARY ANGIOPLASTY      CORONARY ARTERY BYPASS GRAFT      CORONARY BYPASS GRAFT ANGIOGRAPHY  10/04/2021    Procedure: Bypass graft study;  Surgeon: Jose Ruiz MD;  Location: [facility] CATH LAB;  Service: Cardiology;;    CORONARY BYPASS GRAFT ANGIOGRAPHY  3/2/2023    Procedure: Bypass graft study;  Surgeon: Devon Garnica MD;  Location: [facility] CATH LAB;  Service: Cardiology;;    ECHOCARDIOGRAM,TRANSESOPHAGEAL N/A 4/14/2023    Procedure: Transesophageal echo (KIRSTEN) intra-procedure log documentation;  Surgeon: [facility] Diagnostic Provider;  Location: [facility] EP LAB;  Service: Cardiology;  Laterality: N/A;    ENDOSCOPIC NASAL CAUTERIZATION Bilateral 11/3/2023    Procedure: CAUTERIZATION, NOSE, ENDOSCOPIC;  Surgeon: Jono Russell MD;  [facility];  Service: ENT;  Laterality: Bilateral;    ENDOSCOPIC NASAL CAUTERIZATION N/A 12/18/2023    Procedure: CAUTERIZATION, NOSE, ENDOSCOPIC;  Surgeon: Jono Russell MD;  Location: [facility] OR 2ND FLR;  Service: ENT;  Laterality: N/A;    EYE SURGERY       FUNCTIONAL ENDOSCOPIC SINUS SURGERY (FESS) Bilateral 6/14/2023    Procedure: FESS (FUNCTIONAL ENDOSCOPIC SINUS SURGERY);  Surgeon: Jono Russell MD;  Location: [facility] OR 2ND FLR;  Service: ENT;  Laterality: Bilateral;    HERNIA REPAIR      INTRAOCULAR PROSTHESES INSERTION Left 11/13/2022    Procedure: INSERTION, IOL PROSTHESIS;  Surgeon: Alia Mckeon MD;  [facility];  Service: Ophthalmology;  Laterality: Left;    INTRAOCULAR PROSTHESES INSERTION Right 12/04/2022    Procedure: INSERTION, IOL PROSTHESIS;  Surgeon: Alia Mckeon MD;  [facility];  Service: Ophthalmology;  Laterality: Right;    LIGATION, ARTERY, SPHENOPALATINE, ENDOSCOPIC Bilateral 6/14/2023    Procedure: LIGATION, ARTERY, SPHENOPALATINE, ENDOSCOPIC;  Surgeon: Jono Russell MD;  Location: [facility] OR 2ND FLR;  Service: ENT;  Laterality: Bilateral;    PHACOEMULSIFICATION OF CATARACT Left 11/13/2022    Procedure: PHACOEMULSIFICATION, CATARACT;  Surgeon: Alia Mckeon MD;  Location: [facility] OR [address] [place] [address];  Service: Ophthalmology;  Laterality: Left;    PHACOEMULSIFICATION OF CATARACT Right 12/04/2022    Procedure: PHACOEMULSIFICATION, CATARACT;  Surgeon: Alia Mckeon MD;  Location: [facility] OR [address] [place] [address];  Service: Ophthalmology;  Laterality: Right;    SPINE SURGERY      TRANSESOPHAGEAL ECHOCARDIOGRAPHY N/A 3/22/2023    Procedure: ECHOCARDIOGRAM, TRANSESOPHAGEAL;  Surgeon: [facility] Diagnostic Provider;  Location: [facility] EP LAB;  Service: Anesthesiology;  Laterality: N/A;    TRANSESOPHAGEAL ECHOCARDIOGRAPHY N/A 4/11/2023    Procedure: ECHOCARDIOGRAM, TRANSESOPHAGEAL;  Surgeon: [facility] Diagnostic Provider;  Location: [facility] EP LAB;  Service: Anesthesiology;  Laterality: N/A;    VASECTOMY         Family History   Problem Relation Age of Onset    Heart failure Mother     Heart disease Mother     Heart failure Father     Heart disease Father     Alcohol abuse Father     Heart failure Brother     Heart disease Brother     Diabetes Brother      No Known Problems Sister     No Known Problems Maternal Grandmother     No Known Problems Maternal Grandfather     No Known Problems Paternal Grandmother     No Known Problems Paternal Grandfather     Heart disease Sister     No Known Problems Maternal Aunt     No Known Problems Maternal Uncle     No Known Problems Paternal Aunt     No Known Problems Paternal Uncle     Amblyopia Neg Hx     Blindness Neg Hx     Cancer Neg Hx     Cataracts Neg Hx     Glaucoma Neg Hx     Hypertension Neg Hx     Macular degeneration Neg Hx     Retinal detachment Neg Hx     Strabismus Neg Hx     Stroke Neg Hx     Thyroid disease Neg Hx     Anemia Neg Hx     Arrhythmia Neg Hx     Asthma Neg Hx     Clotting disorder Neg Hx     Fainting Neg Hx     Heart attack Neg Hx     Hyperlipidemia Neg Hx     Atrial Septal Defect Neg Hx     Melanoma Neg Hx        Social History     Socioeconomic History    Marital status:      Spouse name: Ameena    Number of children: 3   Occupational History    Occupation: retired   Tobacco Use    Smoking status: Former     Current packs/day: 0.00     Average packs/day: 1 pack/day for 20.0 years (20.0 ttl pk-yrs)     Types: Cigarettes     Start date: 1960     Quit date: 1980     Years since quittin.3     Passive exposure: Never    Smokeless tobacco: Never   Substance and Sexual Activity    Alcohol use: No    Drug use: No    Sexual activity: Not Currently     Partners: Female     Social Determinants of Health     Financial Resource Strain: Low Risk  (2023)    Overall Financial Resource Strain (CARDIA)     Difficulty of Paying Living Expenses: Not hard at all   Food Insecurity: No Food Insecurity (2023)    Hunger Vital Sign     Worried About Running Out of Food in the Last Year: Never true     Ran Out of Food in the Last Year: Never true   Transportation Needs: No Transportation Needs (2023)    PRAPARE - Transportation     Lack of Transportation (Medical): No     Lack of  Transportation (Non-Medical): No   Physical Activity: Insufficiently Active (12/16/2023)    Exercise Vital Sign     Days of Exercise per Week: 1 day     Minutes of Exercise per Session: 10 min   Stress: No Stress Concern Present (12/16/2023)    [employer] [place] [employer] - Occupational Stress Questionnaire     Feeling of Stress : Only a little   Social Connections: Moderately Integrated (12/16/2023)    Social Connection and Isolation Panel [NHANES]     Frequency of Communication with Friends and Family: More than three times a week     Frequency of Social Gatherings with Friends and Family: More than three times a week     Attends [employer] Services: More than 4 times per year     Active Member of Clubs or Organizations: No     Attends Club or Organization Meetings: Never     Marital Status:    Recent Concern: Social Connections - Moderately Isolated (10/9/2023)    Social Connection and Isolation Panel [NHANES]     Frequency of Communication with Friends and Family: More than three times a week     Frequency of Social Gatherings with Friends and Family: More than three times a week     Attends [employer] Services: Never     Active Member of Clubs or Organizations: No     Attends Club or Organization Meetings: Never     Marital Status:    Housing Stability: Low Risk  (12/16/2023)    Housing Stability Vital Sign     Unable to Pay for Housing in the Last Year: No     Number of Places Lived in the Last Year: 1     Unstable Housing in the Last Year: No       Review of Systems   Constitutional:  Negative for chills and fever.   HENT:  Positive for nosebleeds. Negative for sinus pain.    Respiratory:  Positive for shortness of breath. Negative for cough and wheezing.    Cardiovascular:  Negative for chest pain and palpitations.   Gastrointestinal:  Negative for abdominal pain, constipation, heartburn, melena, nausea and vomiting.   Genitourinary:  Negative for dysuria and urgency.    Musculoskeletal:  Negative for myalgias.   Neurological:  Negative for dizziness, weakness and headaches.       Medication List with Changes/Refills   Current Medications    ACETAMINOPHEN (TYLENOL) 500 MG TABLET    Take 500 mg by mouth daily as needed for Pain.    ALLOPURINOL (ZYLOPRIM) 100 MG TABLET    Take 1 tablet (100 mg total) by mouth once daily.    BUMETANIDE (BUMEX) 1 MG TABLET    Take 1 tablet (1 mg total) by mouth every other day.    DOXYCYCLINE (VIBRA-TABS) 100 MG TABLET    Take 1 tablet (100 mg total) by mouth every 12 (twelve) hours. for 10 days    FERROUS GLUCONATE (FERGON) 324 MG TABLET    Take 1 tablet (324 mg total) by mouth daily with breakfast.    ISOSORBIDE MONONITRATE (IMDUR) 60 MG 24 HR TABLET    Take 1 tablet (60 mg total) by mouth every evening.    METOPROLOL SUCCINATE (TOPROL-XL) 25 MG 24 HR TABLET    Take 1 tablet (25 mg total) by mouth once daily.    OMEGA-3 FATTY ACIDS/FISH OIL (FISH OIL-OMEGA-3 FATTY ACIDS) 300-1,000 MG CAPSULE    Take 1 capsule by mouth once daily.    ROSUVASTATIN (CRESTOR) 40 MG TAB    TAKE 1 TABLET EVERY EVENING.    TRAZODONE (DESYREL) 50 MG TABLET    Take 1 tablet (50 mg total) by mouth nightly as needed for Insomnia.    WARFARIN (COUMADIN) 5 MG TABLET    Take warfarin 7.5mg (1.5 tablets) PO Tuesday & Saturday, then take 5mg PO all other days or as instructed by Coumadin Clinic       Patient Active Problem List   Diagnosis    Hyperlipidemia associated with type 2 diabetes mellitus    History of colon resection    Renovascular hypertension    History of gout    Atherosclerosis of native artery of extremity with intermittent claudication    Type 2 diabetes mellitus with diabetic peripheral angiopathy without gangrene    Bilateral carotid artery disease    Pulmonary heart disease    Metabolic acidosis with normal anion gap and bicarbonate losses    Gastrointestinal hemorrhage associated with intestinal diverticulosis    Hx of colonic polyp    Hypertension associated  "with diabetes    Bilateral carpal tunnel syndrome    Numbness and tingling in both hands    Severe carpal tunnel syndrome of right wrist    Atherosclerosis of native coronary artery of native heart without angina pectoris    Anemia    CKD (chronic kidney disease), stage IV    Syncope    Acute blood loss anemia    Recurrent epistaxis    Supratherapeutic INR    Coronary artery disease involving native coronary artery of native heart without angina pectoris    Chronic combined systolic and diastolic heart failure    Paroxysmal atrial fibrillation    Mitral insufficiency    Purpura    Aortic calcification    Dysphagia               Objective:      BP (!) 128/58 (BP Location: Right arm, Patient Position: Sitting, BP Method: Medium (Manual))   Pulse 89   Resp 15   Ht 5' 6" (1.676 m)   Wt 78.8 kg (173 lb 11.6 oz)   SpO2 98%   BMI 28.04 kg/m²   Estimated body mass index is 28.04 kg/m² as calculated from the following:    Height as of this encounter: 5' 6" (1.676 m).    Weight as of this encounter: 78.8 kg (173 lb 11.6 oz).      Physical Exam  Constitutional:       Appearance: Normal appearance. He is normal weight.   HENT:      Head: Normocephalic and atraumatic.      Nose:      Right Nostril: Epistaxis present. No septal hematoma.      Left Nostril: Epistaxis present. No septal hematoma.      Mouth/Throat:      Mouth: Mucous membranes are moist.      Pharynx: Oropharynx is clear.   Eyes:      Conjunctiva/sclera: Conjunctivae normal.   Cardiovascular:      Rate and Rhythm: Normal rate. Rhythm irregular.      Pulses: Normal pulses.      Heart sounds: Normal heart sounds.   Pulmonary:      Effort: Pulmonary effort is normal.      Breath sounds: Normal breath sounds.   Abdominal:      General: Abdomen is flat. Bowel sounds are normal.      Palpations: Abdomen is soft.   Skin:     General: Skin is warm and dry.      Capillary Refill: Capillary refill takes less than 2 seconds.   Neurological:      General: No focal " deficit present.      Mental Status: He is alert and oriented to person, place, and time.   Psychiatric:         Mood and Affect: Mood normal.         Behavior: Behavior normal.           Assessment and Plan:         Problem List Items Addressed This Visit    None  Visit Diagnoses       Hospital discharge follow-up    -  Primary    -83-year-old male patient with a past medical history of CAD status post CABG, mechanical aortic valve replacement on warfarin, AFib, type 2 diabetes, CKD stage 4, CHF who presents for a post hospitalization follow-up.  Patient admitted 12/15-12/19 for recurrent epistaxis where he underwent cauterization on 12/18 to no complications.  Nasal cultures positive for staph aureus.  Sent home with a 10 day course of doxycycline (which he reports being compliant with) and no other changes to his medications.  While in clinic today patient started with profuse epistaxis.  Reports that this is the 1st time he has had a nosebleed since the cauterization on the 18th.  Nose packed with tissue paper and pressure with no improvement.  Contacted ENT office, Dr. Russell, who agreed to see patient in the ED for possible nasal packing. Patient provided w/ wheelchair and escort across the street to emergency department.  ED called and notified of patient arrival.    Epistaxis        Relevant Orders    Refer to Emergency Dept.            Follow Up:       Diagnoses and all orders for this visit:    Hospital discharge follow-up    Epistaxis  -     Refer to Emergency Dept.            Other Orders Placed This Visit:  Orders Placed This Encounter   Procedures    Refer to Emergency Dept.             Interview, Assessment, Findings, and Plan discussed with Dr. Rose    No follow-ups on file.    Fredis High MD PGY-1  Internal Medicine

## 2023-12-22 NOTE — DISCHARGE INSTRUCTIONS
Diagnosis:   1. Epistaxis      Home Care Instructions:  - Nasal hygiene regimen (continue nasal saline, vaseline; afrin prn for nasal bleeding)     Follow-Up Plan:  - Follow-up with: Primary care provider within 1 week  - Follow up with your ENT provider, Dr. Russell    Return to the Emergency Department for symptoms including but not limited to: worsening bleeding, shortness of breath or chest pain, or other concerning symptoms.

## 2023-12-22 NOTE — HPI
Dariel Urbina is a 83 y.o. male with past medical and surgical history as detailed below that presents to the hospital for epistaxis on 12/22/2023.    ENT consulted for evaluation given history of prior interventions. Patient states he was at his PCP's office earlier today and experienced a low volume nose bleed from the left nasal cavity that produced droplets of blood but has since resolved. No precipitating event, no recent nasal trauma. Resolved without any intervention. States he has not experienced any other nosebleeds recently, continues to use nasal saline daily and vaseline at nasal vestibule. When he does experience nosebleeds he uses Afrin.  Denies current PND, nasal bleeding, metallic taste, difficulty breathing/SOB.

## 2023-12-23 NOTE — ED NOTES
Discharge instructions provided by MD. Pt had no further questions. Ambulated to restroom without difficulty.

## 2023-12-26 ENCOUNTER — TELEPHONE (OUTPATIENT)
Dept: OTOLARYNGOLOGY | Facility: CLINIC | Age: 83
End: 2023-12-26
Payer: MEDICARE

## 2023-12-26 ENCOUNTER — PATIENT OUTREACH (OUTPATIENT)
Dept: EMERGENCY MEDICINE | Facility: HOSPITAL | Age: 83
End: 2023-12-26
Payer: MEDICARE

## 2023-12-26 RX ORDER — WARFARIN SODIUM 5 MG/1
TABLET ORAL
Qty: 35 TABLET | Refills: 0 | Status: SHIPPED | OUTPATIENT
Start: 2023-12-26 | End: 2024-02-14 | Stop reason: SDUPTHER

## 2023-12-26 NOTE — PROGRESS NOTES
Patient was seen in the ED on 12/22/23 for epistaxis. They have an appointment scheduled with Dr. Jono Russell on 1/2/24 at 3:00. ED navigator will remind patient of appointment.

## 2023-12-26 NOTE — TELEPHONE ENCOUNTER
----- Message from Yesenia Cross MD sent at 12/22/2023  5:22 PM CST -----  Regarding: Follow-up appointment -- ED/postop  Hello,      Please schedule this patient for a follow-up appointment with Dr. Russell in 1-3 weeks for epistaxis. Thank you!    Yesenia    
No

## 2023-12-26 NOTE — PROGRESS NOTES
12/26/23 Wife called to request a prescription for Warfarin - 5mg tablets be called in to [facility] mail order pharmacy ph # [phone], next INR is due 12/27

## 2023-12-27 ENCOUNTER — LAB VISIT (OUTPATIENT)
Dept: LAB | Facility: HOSPITAL | Age: 83
End: 2023-12-27
Attending: INTERNAL MEDICINE
Payer: MEDICARE

## 2023-12-27 ENCOUNTER — PATIENT OUTREACH (OUTPATIENT)
Dept: ADMINISTRATIVE | Facility: HOSPITAL | Age: 83
End: 2023-12-27
Payer: MEDICARE

## 2023-12-27 ENCOUNTER — ANTI-COAG VISIT (OUTPATIENT)
Dept: CARDIOLOGY | Facility: CLINIC | Age: 83
End: 2023-12-27
Payer: MEDICARE

## 2023-12-27 DIAGNOSIS — Z79.01 ANTICOAGULANT LONG-TERM USE: ICD-10-CM

## 2023-12-27 DIAGNOSIS — I48.0 PAROXYSMAL ATRIAL FIBRILLATION: ICD-10-CM

## 2023-12-27 DIAGNOSIS — Z95.2 H/O MECHANICAL AORTIC VALVE REPLACEMENT: ICD-10-CM

## 2023-12-27 LAB
INR PPP: 1.6 (ref 0.8–1.2)
PROTHROMBIN TIME: 16.3 SEC (ref 9–12.5)

## 2023-12-27 PROCEDURE — 93793 PR ANTICOAGULANT MGMT FOR PT TAKING WARFARIN: ICD-10-PCS | Mod: S$GLB,,,

## 2023-12-27 PROCEDURE — 85610 PROTHROMBIN TIME: CPT | Mod: HCNC | Performed by: INTERNAL MEDICINE

## 2023-12-27 PROCEDURE — 36415 COLL VENOUS BLD VENIPUNCTURE: CPT | Mod: HCNC | Performed by: INTERNAL MEDICINE

## 2023-12-27 PROCEDURE — 93793 ANTICOAG MGMT PT WARFARIN: CPT | Mod: S$GLB,,,

## 2023-12-27 NOTE — PROGRESS NOTES
Health Maintenance Due   Topic Date Due    RSV Vaccine (Age 60+ and Pregnant patients) (1 - 1-dose 60+ series) Never done    Eye Exam  2023     Population Health Chart Review & Patient Outreach Details    Updates Requested / Reviewed:     [x]  Care Everywhere    []     []  External Sources ([facility], [facility], [facility], etc.)    [] [facility]   [] [facility]   [] Other:    [x]  Care Team Updated   []  Removed  or Duplicate Orders   [x]  Immunization Reconciliation Completed / Queried    [x] [place]   [] [place]   [] [place]   [] [place]      Health Maintenance Topics Addressed and Outreach Outcomes / Actions Taken:             Breast Cancer Screening []  Mammogram Order Placed    []  Mammogram Screening Scheduled    []  External Records Requested & Care Team Updated if Applicable    []  External Records Uploaded & Care Team Updated if Applicable    []  Pt Declined Scheduling Mammogram    []  Pt Will Schedule with External Provider / Order Routed & Care Team Updated if Applicable              Cervical Cancer Screening []  Pap Smear Scheduled in Primary Care or OBGYN    []  External Records Requested & Care Team Updated if Applicable       []  External Records Uploaded, Care Team Updated, & History Updated if Applicable    []  Patient Declined Scheduling Pap Smear    []  Patient Will Schedule with External Provider & Care Team Updated if Applicable                  Colorectal Cancer Screening []  Colonoscopy Case Request / Referral / Home Test Order Placed    []  External Records Requested & Care Team Updated if Applicable    []  External Records Uploaded, Care Team Updated, & History Updated if Applicable    []  Patient Declined Completing Colon Cancer Screening    []  Patient Will Schedule with External Provider & Care Team Updated if Applicable    []  Fit Kit Mailed (add the SmartPhrase under additional notes)    []  Reminded Patient to Complete Home Test                Diabetic Eye Exam []  Eye Exam  Screening Order Placed    []  Eye Camera Scheduled or Optometry/Ophthalmology Referral Placed    []  External Records Requested & Care Team Updated if Applicable    []  External Records Uploaded, Care Team Updated, & History Updated if Applicable    []  Patient Declined Scheduling Eye Exam    []  Patient Will Schedule with External Provider & Care Team Updated if Applicable             Blood Pressure Control []  Primary Care Follow Up Visit Scheduled     []  Remote Blood Pressure Reading Captured    []  Patient Declined Remote Reading or Scheduling Appt - Escalated to PCP    []  Patient Will Call Back or Send Portal Message with Reading                 HbA1c & Other Labs []  Overdue Lab(s) Ordered    []  Overdue Lab(s) Scheduled    []  External Records Uploaded & Care Team Updated if Applicable    []  Primary Care Follow Up Visit Scheduled     []  Reminded Patient to Complete A1c Home Test    []  Patient Declined Scheduling Labs or Will Call Back to Schedule    []  Patient Will Schedule with External Provider / Order Routed, & Care Team Updated if Applicable           Primary Care Appointment []  Primary Care Appt Scheduled    []  Patient Declined Scheduling or Will Call Back to Schedule    []  Pt Established with External Provider, Updated Care Team, & Informed Pt to Notify Payor if Applicable           Medication Adherence /    Statin Use []  Primary Care Appointment Scheduled    []  Patient Reminded to  Prescription    []  Patient Declined, Provider Notified if Needed    []  Sent Provider Message to Review to Evaluate Pt for Statin, Add Exclusion Dx Codes, Document   Exclusion in Problem List, Change Statin Intensity Level to Moderate or High Intensity if Applicable                Osteoporosis Screening []  Dexa Order Placed    []  Dexa Appointment Scheduled    []  External Records Requested & Care Team Updated    []  External Records Uploaded, Care Team Updated, & History Updated if Applicable    []   Patient Declined Scheduling Dexa or Will Call Back to Schedule    []  Patient Will Schedule with External Provider / Order Routed & Care Team Updated if Applicable       Additional Notes:    Chart review completed for medication adherence as part of October [place] Gap List report.

## 2024-01-02 ENCOUNTER — OFFICE VISIT (OUTPATIENT)
Dept: OTOLARYNGOLOGY | Facility: CLINIC | Age: 84
End: 2024-01-02
Payer: MEDICARE

## 2024-01-02 VITALS — HEART RATE: 7 BPM | DIASTOLIC BLOOD PRESSURE: 66 MMHG | SYSTOLIC BLOOD PRESSURE: 153 MMHG

## 2024-01-02 DIAGNOSIS — J34.89 NASAL SEPTAL PERFORATION: ICD-10-CM

## 2024-01-02 DIAGNOSIS — R04.0 EPISTAXIS: Primary | ICD-10-CM

## 2024-01-02 DIAGNOSIS — N18.32 STAGE 3B CHRONIC KIDNEY DISEASE: ICD-10-CM

## 2024-01-02 DIAGNOSIS — J34.3 HYPERTROPHY OF BOTH INFERIOR NASAL TURBINATES: ICD-10-CM

## 2024-01-02 PROCEDURE — 3078F DIAST BP <80 MM HG: CPT | Mod: HCNC,CPTII,S$GLB, | Performed by: STUDENT IN AN ORGANIZED HEALTH CARE EDUCATION/TRAINING PROGRAM

## 2024-01-02 PROCEDURE — 1111F DSCHRG MED/CURRENT MED MERGE: CPT | Mod: HCNC,CPTII,S$GLB, | Performed by: STUDENT IN AN ORGANIZED HEALTH CARE EDUCATION/TRAINING PROGRAM

## 2024-01-02 PROCEDURE — 31231 NASAL ENDOSCOPY DX: CPT | Mod: HCNC,S$GLB,, | Performed by: STUDENT IN AN ORGANIZED HEALTH CARE EDUCATION/TRAINING PROGRAM

## 2024-01-02 PROCEDURE — 99999 PR PBB SHADOW E&M-EST. PATIENT-LVL III: CPT | Mod: PBBFAC,HCNC,, | Performed by: STUDENT IN AN ORGANIZED HEALTH CARE EDUCATION/TRAINING PROGRAM

## 2024-01-02 PROCEDURE — 1159F MED LIST DOCD IN RCRD: CPT | Mod: HCNC,CPTII,S$GLB, | Performed by: STUDENT IN AN ORGANIZED HEALTH CARE EDUCATION/TRAINING PROGRAM

## 2024-01-02 PROCEDURE — 1126F AMNT PAIN NOTED NONE PRSNT: CPT | Mod: HCNC,CPTII,S$GLB, | Performed by: STUDENT IN AN ORGANIZED HEALTH CARE EDUCATION/TRAINING PROGRAM

## 2024-01-02 PROCEDURE — 99213 OFFICE O/P EST LOW 20 MIN: CPT | Mod: 25,HCNC,S$GLB, | Performed by: STUDENT IN AN ORGANIZED HEALTH CARE EDUCATION/TRAINING PROGRAM

## 2024-01-02 PROCEDURE — 3077F SYST BP >= 140 MM HG: CPT | Mod: HCNC,CPTII,S$GLB, | Performed by: STUDENT IN AN ORGANIZED HEALTH CARE EDUCATION/TRAINING PROGRAM

## 2024-01-02 NOTE — PROGRESS NOTES
Subjective:      Dariel is a 83 y.o. male who comes for follow-up of  epistaxis .  His last visit with me was on 12/15/2023.  He is well know to me.  He was previously undergone bilateral SP ligation followed by bilateral embolization, cautery on the left septum for persistent bleeding on 11/03/2023 and again recently on 12/18/23 after he was supratherapeutic on his coumadin.      Reports that he has not had significant bleeding event since discharge. Using rinses daily. INR 1.6 at last check.      His current sinus regime consists of:  Nasal saline.    The patient's medications, allergies, past medical, surgical, social and family histories were reviewed and updated as appropriate.      A detailed review of systems was obtained with pertinent positives as per the above HPI, and otherwise negative.        Objective:     BP (!) 153/66 (BP Location: Left arm, Patient Position: Sitting)   Pulse (!) 7        Constitutional:   Vital signs are normal. He appears well-developed and well-nourished.     Head:  Normocephalic and atraumatic.     Ears:  Hearing normal to normal and whispered voice; external ear normal without scars, lesions, or masses; ear canal, tympanic membrane, and middle ear normal..   Right Ear: No swelling. Tympanic membrane is not perforated and not bulging. No middle ear effusion.   Left Ear: No swelling. Tympanic membrane is not perforated and not bulging.  No middle ear effusion.     Nose:  Nose normal including turbinates, nasal mucosa, sinuses and nasal septum. No epistaxis.     Mouth/Throat  Oropharynx clear and moist without lesions or asymmetry and normal uvula midline. Normal dentition. No tonsillar abscesses. Tonsillar exudate.      Neck:  Neck normal without thyromegaly masses, asymmetry, normal tracheal structure, crepitus, and tenderness, thyroid normal, trachea normal, phonation normal, full range of motion with neck supple and no adenopathy. No stridor present.        Head (right  "side): No submental adenopathy present.        Head (left side): No submental adenopathy present.     He has no cervical adenopathy.     Pulmonary/Chest:   No stridor.       Procedure    Nasal Endoscopy:  1/2/2024    The use of diagnostic nasal endoscopy was considered medically necessary for the evaluation and visualization of the nasal anatomy for symptoms suggestive of nasal or sinus origin. Physical examination (including a nasal speculum evaluation) did not provide sufficient clinical information to establish a diagnosis, or symptoms did not improve or worsened following treatment.     The nasal cavity was decongested with topical 1% phenylephrine and anesthetized with 4% lidocaine.  A rigid 0-degree endoscope was introduced into the nasal cavity.    The patient was seated in the examination chair. After discussion of risks and benefits, a nasal endoscope was inserted into the nose the endoscope was passed along the left nasal floor to the nasopharynx. It was then passed between the middle and superior meatus, nasal turbinates, nasal septum, nasopharynx and sphenoethmoid region. The nasal endoscope was withdrawn and there was no complications. An identical procedure was performed on the right side. I was present for the entire procedure.The patient tolerated the above procedure well. The findings of this procedure can be found in the dictated note from 1/2/2024 visit.      Much improved left nasal septum. Development of septal perforation anteriorly, small.      Data Reviewed    WBC (K/uL)   Date Value   12/22/2023 6.19     Eosinophil % (%)   Date Value   12/22/2023 2.6     Eos # (K/uL)   Date Value   12/22/2023 0.2     Platelets (K/uL)   Date Value   12/22/2023 167     Glucose (mg/dL)   Date Value   12/19/2023 244 (H)     No results found for: "IGE"    No sinus imaging available.      Assessment:     1. Epistaxis    2. Nasal septal perforation    3. Hypertrophy of both inferior nasal turbinates    4. Stage 3b " chronic kidney disease         Plan:     - Doing well  - Cont aggressive saline   - Bacitracin ointment in nares at night  - RTC 1 month    Jono Russell MD

## 2024-01-03 ENCOUNTER — LAB VISIT (OUTPATIENT)
Dept: LAB | Facility: HOSPITAL | Age: 84
End: 2024-01-03
Attending: INTERNAL MEDICINE
Payer: MEDICARE

## 2024-01-03 DIAGNOSIS — Z79.01 ANTICOAGULANT LONG-TERM USE: ICD-10-CM

## 2024-01-03 DIAGNOSIS — I48.0 PAROXYSMAL ATRIAL FIBRILLATION: ICD-10-CM

## 2024-01-03 DIAGNOSIS — Z95.2 H/O MECHANICAL AORTIC VALVE REPLACEMENT: ICD-10-CM

## 2024-01-03 PROBLEM — I50.33 ACUTE ON CHRONIC DIASTOLIC HEART FAILURE: Status: ACTIVE | Noted: 2024-01-03

## 2024-01-03 PROBLEM — D69.2 PURPURA: Status: RESOLVED | Noted: 2023-07-26 | Resolved: 2024-01-03

## 2024-01-03 PROBLEM — N25.81 SECONDARY HYPERPARATHYROIDISM OF RENAL ORIGIN: Status: ACTIVE | Noted: 2024-01-03

## 2024-01-03 LAB
INR PPP: 1.7 (ref 0.8–1.2)
PROTHROMBIN TIME: 17.2 SEC (ref 9–12.5)

## 2024-01-03 PROCEDURE — 36415 COLL VENOUS BLD VENIPUNCTURE: CPT | Mod: HCNC | Performed by: INTERNAL MEDICINE

## 2024-01-03 PROCEDURE — 85610 PROTHROMBIN TIME: CPT | Mod: HCNC | Performed by: INTERNAL MEDICINE

## 2024-01-03 NOTE — PROGRESS NOTES
I have reviewed and concur with the resident's history, physical, assessment, and plan.  I have personally interviewed and examined the patient  Dariel Urbina  at bedside.  See below addendum for my evaluation and additional findings.  Diagnoses and all orders for this visit:    Hospital discharge follow-up    Epistaxis  -     Refer to Emergency Dept.    Acute on chronic diastolic heart failure  Stable, euvolemic today    Secondary hyperparathyroidism of renal origin  Sees nephro    Type 2 diabetes mellitus with diabetic peripheral angiopathy without gangrene, with long-term current use of insulin  Lab Results   Component Value Date    HGBA1C 6.1 (H) 11/07/2023       Paroxysmal atrial fibrillation  Complicated by coumadin related nosebleeding    CKD (chronic kidney disease), stage IV  Sees nephro    Chronic combined systolic and diastolic heart failure  As above         POST-OP NOTE    SAROJ NOBLE | [id number] | [facility] [place] [facility] [address]    Procedure: s/p incision and drainage of abscess of left upper extremity    Subjective: Patient's mother reports that she is still recovering from the anesthetic, but is otherwise doing well. Patient's mother reports her pain is well controlled.  No chest pain, no nausea/vomiting, fever chills or malaise.    Vital Signs Last 24 Hrs  T(C): 36.9 (15 Itz 2021 12:06), Max: 37.5 (14 Jun 2021 17:25)  T(F): 98.4 (15 Itz 2021 12:06), Max: 99.5 (14 Jun 2021 17:25)  HR: 83 (15 Itz 2021 12:06) (82 - 116)  BP: 90/56 (15 Itz 2021 12:06) (86/57 - 105/92)  BP(mean): 95 (15 Itz 2021 11:00) (62 - 95)  RR: 24 (15 Itz 2021 12:06) (19 - 27)  SpO2: 95% (15 Itz 2021 12:06) (95% - 100%)  I&O's Summary    14 Jun 2021 07:01  -  15 Itz 2021 07:00  --------------------------------------------------------  IN: 552 mL / OUT: 180 mL / NET: 372 mL    15 Itz 2021 07:01  -  15 Itz 2021 16:07  --------------------------------------------------------  IN: 46 mL / OUT: 0 mL / NET: 46 mL                            10.6   9.01  )-----------( 300      ( 14 Jun 2021 07:49 )             32.5     06-14    136  |  104  |  5<L>  ----------------------------<  103<H>  4.2   |  18<L>  |  0.28    Ca    10.4      14 Jun 2021 07:49  Phos  4.9     06-14  Mg     2.0     06-14         PHYSICAL EXAM:  Gen: NAD, resting comfortably  Resp: breathing easily, no stridor  Abdomen: soft, nontender, nondistended  Skin: Incision c/d/i. Dressing not saturated.      4 year old female with PMHx febrile seizures presenting for left hand interdigital cellulitis and collection associated with left elbow swelling and pain, and fever for the past 5 days. Now several hours s/p incision and drainage of abscess of left upper extremity    - Wound packing to be removed tomorrow morning  - Await path for cultures sent  - Regular diet  - Pain control as necessary  - Appreciate care per Primary team    Pediatric Surgery  [id number]

## 2024-01-04 ENCOUNTER — ANTI-COAG VISIT (OUTPATIENT)
Dept: CARDIOLOGY | Facility: CLINIC | Age: 84
End: 2024-01-04
Payer: MEDICARE

## 2024-01-04 PROCEDURE — 93793 ANTICOAG MGMT PT WARFARIN: CPT | Mod: S$GLB,,,

## 2024-01-04 NOTE — PROGRESS NOTES
[facility]    2024 9:06 AM    Assessment/Plan:    Patient presents today with subtherapeutic  INR.    Assessment of patient findings and chart review: Pt recently completed doxy course and had increase in vit k.  Will have pt resume normal diet, boost dosing and resume maintenance dose.  Will consider increasing maintenance dose if pt continues to be below therapeutic range.     Recommendation for patient's warfarin regimen: Boost dosing & resume.  Continue to monitor closely.     Recommend repeat INR in 1 week  _________________________________________________________________    Dariel Urbina (83 y.o.) is followed by the [facility].    Anticoagulation Summary  As of 2024      INR goal:  2.0-3.0   TTR:  68.8 % (11.4 y)   INR used for dosin.7 (1/3/2024)   Warfarin maintenance plan:  2.5 mg (5 mg x 0.5) every Sat; 5 mg (5 mg x 1) all other days   Weekly warfarin total:  32.5 mg   Plan last modified:  Laurie Patino, PharmD (2024)   Next INR check:     Target end date:      Indications    Anticoagulant long-term use (Resolved) [Z79.01]  H/O mechanical aortic valve replacement (Resolved) [Z95.2]                 Anticoagulation Episode Summary       INR check location:  Clinic Lab    Preferred lab:      Send INR reminders to:  [facility] COUMADIN MONITORING POOL    Comments:  [facility] only Mon - Thu // carpel tunnel release  - target low end of range          Anticoagulation Care Providers       Provider Role Specialty Phone number    Devon Garnica MD [facility] Cardiology [phone]            Patient Findings       Positives:  Change in diet/appetite    Negatives:  Signs/symptoms of thrombosis, Signs/symptoms of bleeding, Laboratory test error suspected, Change in health, Change in alcohol use, Change in activity, Upcoming invasive procedure, Emergency department visit, Upcoming  dental procedure, Missed doses, Extra doses, Change in medications, Hospital admission, Bruising, Other complaints    Comments:  1/4/24 dose reviewed, and pt had creamed cabbage on Monday. No other changes reported per Ameena.

## 2024-01-11 ENCOUNTER — TELEPHONE (OUTPATIENT)
Dept: OPTOMETRY | Facility: CLINIC | Age: 84
End: 2024-01-11
Payer: MEDICARE

## 2024-01-11 ENCOUNTER — LAB VISIT (OUTPATIENT)
Dept: LAB | Facility: HOSPITAL | Age: 84
End: 2024-01-11
Attending: INTERNAL MEDICINE
Payer: MEDICARE

## 2024-01-11 ENCOUNTER — ANTI-COAG VISIT (OUTPATIENT)
Dept: CARDIOLOGY | Facility: CLINIC | Age: 84
End: 2024-01-11
Payer: MEDICARE

## 2024-01-11 DIAGNOSIS — Z95.2 H/O MECHANICAL AORTIC VALVE REPLACEMENT: ICD-10-CM

## 2024-01-11 DIAGNOSIS — I48.0 PAROXYSMAL ATRIAL FIBRILLATION: ICD-10-CM

## 2024-01-11 DIAGNOSIS — Z79.01 ANTICOAGULANT LONG-TERM USE: ICD-10-CM

## 2024-01-11 LAB
INR PPP: 2.2 (ref 0.8–1.2)
PROTHROMBIN TIME: 22.4 SEC (ref 9–12.5)

## 2024-01-11 PROCEDURE — 85610 PROTHROMBIN TIME: CPT | Mod: HCNC | Performed by: INTERNAL MEDICINE

## 2024-01-11 PROCEDURE — 93793 ANTICOAG MGMT PT WARFARIN: CPT | Mod: S$GLB,,,

## 2024-01-11 PROCEDURE — 36415 COLL VENOUS BLD VENIPUNCTURE: CPT | Mod: HCNC | Performed by: INTERNAL MEDICINE

## 2024-01-18 ENCOUNTER — OFFICE VISIT (OUTPATIENT)
Dept: OTOLARYNGOLOGY | Facility: CLINIC | Age: 84
End: 2024-01-18
Payer: MEDICARE

## 2024-01-18 VITALS
DIASTOLIC BLOOD PRESSURE: 84 MMHG | WEIGHT: 174.81 LBS | HEART RATE: 75 BPM | HEIGHT: 66 IN | SYSTOLIC BLOOD PRESSURE: 165 MMHG | BODY MASS INDEX: 28.09 KG/M2

## 2024-01-18 DIAGNOSIS — R04.0 RECURRENT EPISTAXIS: ICD-10-CM

## 2024-01-18 DIAGNOSIS — R49.0 DYSPHONIA: ICD-10-CM

## 2024-01-18 DIAGNOSIS — R13.10 DYSPHAGIA, UNSPECIFIED TYPE: Primary | ICD-10-CM

## 2024-01-18 PROCEDURE — 99214 OFFICE O/P EST MOD 30 MIN: CPT | Mod: 25,S$GLB,, | Performed by: NURSE PRACTITIONER

## 2024-01-18 PROCEDURE — 1111F DSCHRG MED/CURRENT MED MERGE: CPT | Mod: CPTII,S$GLB,, | Performed by: NURSE PRACTITIONER

## 2024-01-18 PROCEDURE — 1126F AMNT PAIN NOTED NONE PRSNT: CPT | Mod: CPTII,S$GLB,, | Performed by: NURSE PRACTITIONER

## 2024-01-18 PROCEDURE — 3079F DIAST BP 80-89 MM HG: CPT | Mod: CPTII,S$GLB,, | Performed by: NURSE PRACTITIONER

## 2024-01-18 PROCEDURE — 3077F SYST BP >= 140 MM HG: CPT | Mod: CPTII,S$GLB,, | Performed by: NURSE PRACTITIONER

## 2024-01-18 PROCEDURE — 31579 LARYNGOSCOPY TELESCOPIC: CPT | Mod: S$GLB,,, | Performed by: NURSE PRACTITIONER

## 2024-01-18 PROCEDURE — 1159F MED LIST DOCD IN RCRD: CPT | Mod: CPTII,S$GLB,, | Performed by: NURSE PRACTITIONER

## 2024-01-18 PROCEDURE — 99999 PR PBB SHADOW E&M-EST. PATIENT-LVL III: CPT | Mod: PBBFAC,HCNC,, | Performed by: NURSE PRACTITIONER

## 2024-01-18 NOTE — PROGRESS NOTES
Subjective     Patient ID: Dariel Urbina is a 83 y.o. male.    Chief Complaint: No chief complaint on file.    HPI  Dariel Urbina is an 83 year old male who was referred by Dr. Ayon for dysphagia. He coughs and chokes on water frequently. He coughs and chokes when eating solids as well.  This has been a problem for several years. His wife thinks that it is occurring more frequently but he does not think it is worse. He does not cough and clear his throat outside of meal times. He does not regurgitate food. He has not had pneumonia recently. He does not notice any heartburn or reflux. His voice has been hoarse with frequent pitch changes and cracks in his voice. He does not notice any vocal fatigue. He is breathing comfortably.    He is followed by Dr. Russell for recurrent epistaxis. He had a nose bleed yesterday, which stopped within several minutes with pressure.     Past Medical History:   Diagnosis Date    Anemia of chronic renal failure, stage 3 (moderate) 05/27/2015    Anticoagulant long-term use     Atherosclerosis of coronary artery bypass graft of native heart without angina pectoris 09/11/2012    3-27-18 [facility] Two vessel coronary artery disease.   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    Bilateral carotid artery disease 02/09/2017    Bleeding from the nose     Bleeding nose 03/21/2018    Cancer     Cataract     CHF (congestive heart failure)     CKD (chronic kidney disease) stage 3, GFR 30-59 ml/min 05/27/2015    Claudication of left lower extremity 09/17/2014    Colon polyp     Encounter for blood transfusion     Gastroesophageal reflux disease without esophagitis 03/19/2018    Gastrointestinal hemorrhage associated with intestinal diverticulosis 04/01/2018    Glaucoma     H/O mechanical aortic valve replacement 09/17/2014    History of gout 09/26/2012    Hyperparathyroidism due to renal insufficiency 07/27/2015    Internal hemorrhoid 04/03/2018     Long term current use of anticoagulant therapy 09/26/2012    Mechanical heart valve present     Metabolic acidosis with normal anion gap and bicarbonate losses 03/20/2018    Mixed hyperlipidemia 09/26/2012    NSTEMI (non-ST elevated myocardial infarction) 03/21/2018    Obesity, diabetes, and hypertension syndrome 02/23/2016    Paroxysmal atrial fibrillation 02/06/2023    PVD (peripheral vascular disease) 09/11/2012    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    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 02/01/2023    vertex scalp    Syncope 09/29/2022    Type 2 diabetes mellitus with diabetic peripheral angiopathy without gangrene 05/27/2015    Type 2 diabetes mellitus with stage 3 chronic kidney disease, without long-term current use of insulin 10/02/2013       Past Surgical History:   Procedure Laterality Date    BACK SURGERY      CARDIAC CATHETERIZATION      CARDIAC VALVE REPLACEMENT      CARDIAC VALVE SURGERY      CARPAL TUNNEL RELEASE Right 05/19/2020    Procedure: RELEASE, CARPAL TUNNEL;  Surgeon: Rupesh Norris Jr., MD;  [facility];  Service: Plastics;  Laterality: Right;    CATARACT EXTRACTION Left 11/13/2022        COLON SURGERY      COLONOSCOPY N/A 03/31/2017    Procedure: COLONOSCOPY;  Surgeon: Bruno Raymond MD;  Location: [facility] (4TH FLR);  Service: Endoscopy;  Laterality: N/A;  Patient's wife requesting date.    COLONOSCOPY N/A 04/03/2018    Procedure: COLONOSCOPY;  Surgeon: Bonifacio Pelletier MD;  Location: [facility] ENDO (2ND FLR);  Service: Endoscopy;  Laterality: N/A;    COLONOSCOPY N/A 08/13/2018    Procedure: COLONOSCOPY;  Surgeon: Kam Barba MD;  Location: [facility] ENDO (2ND FLR);  Service: Endoscopy;  Laterality: N/A;  2nd floor: PA pressure 49; hx of moderate-severe valve disease     per Coumadin clinic-Patient can hold 5 days with lovenox bridge       ok to schedule per Katarina    CORONARY ANGIOGRAPHY N/A 10/04/2021    Procedure: Left heart cath +/-  peripheral angiogram;  Surgeon: Jose Ruiz MD;  Location: [facility] CATH LAB;  Service: Cardiology;  Laterality: N/A;    CORONARY ANGIOGRAPHY N/A 3/2/2023    Procedure: Angiogram, Coronary;  Surgeon: Devon Garnica MD;  Location: [facility] CATH LAB;  Service: Cardiology;  Laterality: N/A;    CORONARY ANGIOPLASTY      CORONARY ARTERY BYPASS GRAFT      CORONARY BYPASS GRAFT ANGIOGRAPHY  10/04/2021    Procedure: Bypass graft study;  Surgeon: Jose Ruiz MD;  Location: [facility] CATH LAB;  Service: Cardiology;;    CORONARY BYPASS GRAFT ANGIOGRAPHY  3/2/2023    Procedure: Bypass graft study;  Surgeon: Devon Garnica MD;  Location: [facility] CATH LAB;  Service: Cardiology;;    ECHOCARDIOGRAM,TRANSESOPHAGEAL N/A 4/14/2023    Procedure: Transesophageal echo (KIRSTEN) intra-procedure log documentation;  Surgeon: [facility] Diagnostic Provider;  Location: [facility] EP LAB;  Service: Cardiology;  Laterality: N/A;    ENDOSCOPIC NASAL CAUTERIZATION Bilateral 11/3/2023    Procedure: CAUTERIZATION, NOSE, ENDOSCOPIC;  Surgeon: Jono Russell MD;  Location: [facility] OR [address] [place] [address];  Service: ENT;  Laterality: Bilateral;    ENDOSCOPIC NASAL CAUTERIZATION N/A 12/18/2023    Procedure: CAUTERIZATION, NOSE, ENDOSCOPIC;  Surgeon: Jono Russell MD;  Location: [facility] OR 2ND FLR;  Service: ENT;  Laterality: N/A;    EYE SURGERY      FUNCTIONAL ENDOSCOPIC SINUS SURGERY (FESS) Bilateral 6/14/2023    Procedure: FESS (FUNCTIONAL ENDOSCOPIC SINUS SURGERY);  Surgeon: Jono Russell MD;  Location: [facility] OR 2ND FLR;  Service: ENT;  Laterality: Bilateral;    HERNIA REPAIR      INTRAOCULAR PROSTHESES INSERTION Left 11/13/2022    Procedure: INSERTION, IOL PROSTHESIS;  Surgeon: Alia Mckeon MD;  Location: [facility] OR 1ST FLR;  Service: Ophthalmology;  Laterality: Left;    INTRAOCULAR PROSTHESES INSERTION Right 12/04/2022    Procedure: INSERTION, IOL PROSTHESIS;  Surgeon: Alia Mckeon MD;  Location: [facility] OR 1ST FLR;  Service: Ophthalmology;  Laterality: Right;     LIGATION, ARTERY, SPHENOPALATINE, ENDOSCOPIC Bilateral 6/14/2023    Procedure: LIGATION, ARTERY, SPHENOPALATINE, ENDOSCOPIC;  Surgeon: Jono Russell MD;  Location: [facility] OR 2ND FLR;  Service: ENT;  Laterality: Bilateral;    PHACOEMULSIFICATION OF CATARACT Left 11/13/2022    Procedure: PHACOEMULSIFICATION, CATARACT;  Surgeon: Alia Mckeon MD;  Location: [facility] OR 1ST FLR;  Service: Ophthalmology;  Laterality: Left;    PHACOEMULSIFICATION OF CATARACT Right 12/04/2022    Procedure: PHACOEMULSIFICATION, CATARACT;  Surgeon: Alia Mckeon MD;  Location: [facility] OR 1ST FLR;  Service: Ophthalmology;  Laterality: Right;    SPINE SURGERY      TRANSESOPHAGEAL ECHOCARDIOGRAPHY N/A 3/22/2023    Procedure: ECHOCARDIOGRAM, TRANSESOPHAGEAL;  Surgeon: [facility] Diagnostic Provider;  Location: [facility] EP LAB;  Service: Anesthesiology;  Laterality: N/A;    TRANSESOPHAGEAL ECHOCARDIOGRAPHY N/A 4/11/2023    Procedure: ECHOCARDIOGRAM, TRANSESOPHAGEAL;  Surgeon: [facility] Diagnostic Provider;  Location: [facility] EP LAB;  Service: Anesthesiology;  Laterality: N/A;    VASECTOMY           Current Outpatient Medications:     acetaminophen (TYLENOL) 500 MG tablet, Take 500 mg by mouth daily as needed for Pain., Disp: , Rfl:     allopurinoL (ZYLOPRIM) 100 MG tablet, Take 1 tablet (100 mg total) by mouth once daily., Disp: 90 tablet, Rfl: 3    bumetanide (BUMEX) 1 MG tablet, Take 1 tablet (1 mg total) by mouth every other day., Disp: 45 tablet, Rfl: 3    ferrous gluconate (FERGON) 324 MG tablet, Take 1 tablet (324 mg total) by mouth daily with breakfast., Disp: 90 tablet, Rfl: 1    isosorbide mononitrate (IMDUR) 60 MG 24 hr tablet, Take 1 tablet (60 mg total) by mouth every evening., Disp: 90 tablet, Rfl: 3    metoprolol succinate (TOPROL-XL) 25 MG 24 hr tablet, Take 1 tablet (25 mg total) by mouth once daily., Disp: 90 tablet, Rfl: 3    omega-3 fatty acids/fish oil (FISH OIL-OMEGA-3 FATTY ACIDS) 300-1,000 mg capsule, Take 1 capsule by  mouth once daily., Disp: , Rfl:     rosuvastatin (CRESTOR) 40 MG Tab, TAKE 1 TABLET EVERY EVENING., Disp: 90 tablet, Rfl: 3    traZODone (DESYREL) 50 MG tablet, Take 1 tablet (50 mg total) by mouth nightly as needed for Insomnia., Disp: 30 tablet, Rfl: 11    warfarin (COUMADIN) 5 MG tablet, Take warfarin 2.5mg PO Saturday, then take 5mg PO all other days or as instructed by [facility], Disp: 35 tablet, Rfl: 0  No current facility-administered medications for this visit.    Facility-Administered Medications Ordered in Other Visits:     ceFAZolin 2 g in dextrose 5 % in water (D5W) 50 mL IVPB (MB+), 2 g, Intravenous, On Call Procedure, Yenifer Sandoval MD    HYDROmorphone injection 0.2 mg, 0.2 mg, Intravenous, Q5 Min PRN, Saeed Farrell MD    sodium chloride 0.9% flush 10 mL, 10 mL, Intravenous, PRN, Saeed Farrell MD    Review of patient's allergies indicates:   Allergen Reactions    Lisinopril     Losartan      Intolerance- elevates potassium level       Social History     Socioeconomic History    Marital status:      Spouse name: Ameena    Number of children: 3   Occupational History    Occupation: retired   Tobacco Use    Smoking status: Former     Current packs/day: 0.00     Average packs/day: 1 pack/day for 20.0 years (20.0 ttl pk-yrs)     Types: Cigarettes     Start date: 1960     Quit date: 1980     Years since quittin.3     Passive exposure: Never    Smokeless tobacco: Never   Substance and Sexual Activity    Alcohol use: No    Drug use: No    Sexual activity: Not Currently     Partners: Female     Social Determinants of Health     Financial Resource Strain: Low Risk  (2023)    Overall Financial Resource Strain (CARDIA)     Difficulty of Paying Living Expenses: Not hard at all   Food Insecurity: No Food Insecurity (2023)    Hunger Vital Sign     Worried About Running Out of Food in the Last Year: Never true     Ran Out of Food in the Last Year:  Never true   Transportation Needs: No Transportation Needs (12/16/2023)    PRAPARE - Transportation     Lack of Transportation (Medical): No     Lack of Transportation (Non-Medical): No   Physical Activity: Insufficiently Active (12/16/2023)    Exercise Vital Sign     Days of Exercise per Week: 1 day     Minutes of Exercise per Session: 10 min   Stress: No Stress Concern Present (12/16/2023)    [employer] - Occupational Stress Questionnaire     Feeling of Stress : Only a little   Social Connections: Moderately Integrated (12/16/2023)    Social Connection and Isolation Panel [NHANES]     Frequency of Communication with Friends and Family: More than three times a week     Frequency of Social Gatherings with Friends and Family: More than three times a week     Attends Evangelical Services: More than 4 times per year     Active Member of Clubs or Organizations: No     Attends Club or Organization Meetings: Never     Marital Status:    Recent Concern: Social Connections - Moderately Isolated (10/9/2023)    Social Connection and Isolation Panel [NHANES]     Frequency of Communication with Friends and Family: More than three times a week     Frequency of Social Gatherings with Friends and Family: More than three times a week     Attends Evangelical Services: Never     Active Member of Clubs or Organizations: No     Attends Club or Organization Meetings: Never     Marital Status:    Housing Stability: Low Risk  (12/16/2023)    Housing Stability Vital Sign     Unable to Pay for Housing in the Last Year: No     Number of Places Lived in the Last Year: 1     Unstable Housing in the Last Year: No       Family History   Problem Relation Age of Onset    Heart failure Mother     Heart disease Mother     Heart failure Father     Heart disease Father     Alcohol abuse Father     Heart failure Brother     Heart disease Brother     Diabetes Brother     No Known  Problems Sister     No Known Problems Maternal Grandmother     No Known Problems Maternal Grandfather     No Known Problems Paternal Grandmother     No Known Problems Paternal Grandfather     Heart disease Sister     No Known Problems Maternal Aunt     No Known Problems Maternal Uncle     No Known Problems Paternal Aunt     No Known Problems Paternal Uncle     Amblyopia Neg Hx     Blindness Neg Hx     Cancer Neg Hx     Cataracts Neg Hx     Glaucoma Neg Hx     Hypertension Neg Hx     Macular degeneration Neg Hx     Retinal detachment Neg Hx     Strabismus Neg Hx     Stroke Neg Hx     Thyroid disease Neg Hx     Anemia Neg Hx     Arrhythmia Neg Hx     Asthma Neg Hx     Clotting disorder Neg Hx     Fainting Neg Hx     Heart attack Neg Hx     Hyperlipidemia Neg Hx     Atrial Septal Defect Neg Hx     Melanoma Neg Hx          Review of Systems   Constitutional:  Negative for appetite change, chills, diaphoresis, fatigue, fever and unexpected weight change.   HENT:  Negative for nasal congestion, dental problem, drooling, ear discharge, ear pain, facial swelling, hearing loss, mouth sores, nosebleeds, postnasal drip, rhinorrhea, sinus pressure/congestion, sneezing, sore throat, tinnitus, trouble swallowing and voice change.    Eyes:  Negative for pain, discharge, redness and itching.   Respiratory:  Negative for cough and shortness of breath.    Cardiovascular:  Negative for chest pain.   Gastrointestinal:  Negative for abdominal distention, abdominal pain, diarrhea, nausea and vomiting.   Endocrine: Negative for cold intolerance and heat intolerance.   Genitourinary:  Negative for difficulty urinating.   Musculoskeletal:  Negative for neck pain and neck stiffness.   Integumentary:  Negative for rash.   Neurological:  Negative for dizziness, weakness and headaches.   Hematological:  Negative for adenopathy.          Objective     Physical Exam  Vitals reviewed.   Constitutional:       General:  He is not in acute distress.     Appearance: He is well-developed. He is not ill-appearing or diaphoretic.   HENT:      Head: Normocephalic and atraumatic.      Jaw: No trismus.      Right Ear: Hearing, tympanic membrane, ear canal and external ear normal.      Left Ear: Hearing, tympanic membrane, ear canal and external ear normal.      Nose: Nasal deformity and rhinorrhea present. No mucosal edema. Rhinorrhea is bloody.      Right Nostril: No epistaxis.      Left Nostril: No epistaxis.      Right Sinus: No maxillary sinus tenderness or frontal sinus tenderness.      Left Sinus: No maxillary sinus tenderness or frontal sinus tenderness.      Mouth/Throat:      Lips: Pink. No lesions.      Mouth: Mucous membranes are moist. Mucous membranes are not pale, not dry and not cyanotic. No oral lesions.      Dentition: Normal dentition. Does not have dentures. No dental caries.      Tongue: No lesions. Tongue does not deviate from midline.      Palate: No mass and lesions.      Pharynx: Uvula midline. No pharyngeal swelling, oropharyngeal exudate, posterior oropharyngeal erythema or uvula swelling.      Tonsils: No tonsillar abscesses.      Comments: Flexible Laryngeal Videostroboscopy (19464): Laryngeal videostroboscopy is indicated to assess laryngeal vibratory biomechanics and vocal fold oscillation, which cannot be assessed with a plain light examination. This was carried out transnasally with a [employer]n chip videoendoscope. After verbal consent was obtained, the patient was positioned and the nose was topically decongested with 1% phenylephrine and topically anesthetized with 4% lidocaine. The endoscope was passed through the most patent nasal cavity and positioned to image the nasopharynx, larynx, and hypopharynx in detail. The following features were examined: nasopharyngeal, laryngeal, hypopharyngeal masses; velopharyngeal strength, closure, and symmetry of motion; vocal fold range and symmetry of motion; laryngeal  mucosal edema, erythema, inflammation, and hydration; salivary pooling; and gross laryngeal sensation. During phonation, the vocal folds were assessed for glottal closure; mucosal wave; vocal fold lesions; vibratory periodicity, amplitude, and phase symmetry; and vertical height match. The equipment was removed. The patient tolerated the procedure well without complication. All findings were normal except:  -there mild hyperfunction  -there is a small glottic gap with adduction at higher pitches      Eyes:      General: No scleral icterus.        Right eye: No discharge.         Left eye: No discharge.      Conjunctiva/sclera: Conjunctivae normal.   Neck:      Thyroid: No thyroid mass or thyromegaly.      Vascular: No JVD.      Trachea: Trachea and phonation normal. No tracheal tenderness or tracheal deviation.      Comments: Salivary glands - there are no lesions or asymmetric findings in the submandibular or parotid glands    Cardiovascular:      Rate and Rhythm: Normal rate.   Pulmonary:      Effort: Pulmonary effort is normal. No respiratory distress.      Breath sounds: No stridor.   Musculoskeletal:      Cervical back: Normal range of motion and neck supple.   Lymphadenopathy:      Head:      Right side of head: No submental, submandibular, tonsillar or preauricular adenopathy.      Left side of head: No submental, submandibular, tonsillar or preauricular adenopathy.      Cervical: No cervical adenopathy.   Skin:     General: Skin is warm and dry.      Coloration: Skin is not pale.      Findings: No erythema or rash.   Neurological:      Mental Status: He is alert and oriented to person, place, and time.      Cranial Nerves: No cranial nerve deficit.   Psychiatric:         Behavior: Behavior normal. Behavior is cooperative.         Thought Content: Thought content normal.        Assessment and Plan     1. Dysphagia, unspecified type  Assessment & Plan:  Vocal cord movement is normal. Discussed undergoing MBSS  to further evaluate coughing and choking. Questions answered. Follow up if needed.     Orders:  -     Ambulatory referral/consult to ENT  -     Cancel: FL Esophagram Complete; Future; Expected date: 01/18/2024  -     Fl Modified Barium Swallow Speech; Future; Expected date: 01/18/2024  -     SLP video swallow; Future; Expected date: 01/18/2024    2. Recurrent epistaxis  Assessment & Plan:  Reinforced nasal moisture management. Follow up with Dr. Russell.      3. Dysphonia  Assessment & Plan:  Discussed voice therapy, he wishes to defer at this time.                   No follow-ups on file.

## 2024-01-18 NOTE — ASSESSMENT & PLAN NOTE
Vocal cord movement is normal. Discussed undergoing MBSS to further evaluate coughing and choking. Questions answered. Follow up if needed.

## 2024-01-25 ENCOUNTER — ANTI-COAG VISIT (OUTPATIENT)
Dept: CARDIOLOGY | Facility: CLINIC | Age: 84
End: 2024-01-25
Payer: MEDICARE

## 2024-01-25 ENCOUNTER — LAB VISIT (OUTPATIENT)
Dept: LAB | Facility: HOSPITAL | Age: 84
End: 2024-01-25
Attending: INTERNAL MEDICINE
Payer: MEDICARE

## 2024-01-25 DIAGNOSIS — I48.0 PAROXYSMAL ATRIAL FIBRILLATION: ICD-10-CM

## 2024-01-25 DIAGNOSIS — Z95.2 H/O MECHANICAL AORTIC VALVE REPLACEMENT: ICD-10-CM

## 2024-01-25 DIAGNOSIS — Z79.01 ANTICOAGULANT LONG-TERM USE: ICD-10-CM

## 2024-01-25 LAB
INR PPP: 1.4 (ref 0.8–1.2)
PROTHROMBIN TIME: 14.9 SEC (ref 9–12.5)

## 2024-01-25 PROCEDURE — 85610 PROTHROMBIN TIME: CPT | Performed by: INTERNAL MEDICINE

## 2024-01-25 PROCEDURE — 93793 ANTICOAG MGMT PT WARFARIN: CPT | Mod: S$GLB,,,

## 2024-01-25 PROCEDURE — 36415 COLL VENOUS BLD VENIPUNCTURE: CPT | Performed by: INTERNAL MEDICINE

## 2024-01-25 NOTE — PROGRESS NOTES
INR not at goal. Medications, chart, and patient findings reviewed. See calendar for adjustments to dose and follow up plan.  Pt denies any changes; however, is close to baseline.  He likely missed a dose.  Will continue to monitor closely.

## 2024-01-31 ENCOUNTER — CLINICAL SUPPORT (OUTPATIENT)
Dept: SPEECH THERAPY | Facility: HOSPITAL | Age: 84
End: 2024-01-31
Payer: MEDICARE

## 2024-01-31 ENCOUNTER — HOSPITAL ENCOUNTER (OUTPATIENT)
Dept: RADIOLOGY | Facility: HOSPITAL | Age: 84
Discharge: HOME | End: 2024-01-31
Attending: NURSE PRACTITIONER
Payer: MEDICARE

## 2024-01-31 ENCOUNTER — ANTI-COAG VISIT (OUTPATIENT)
Dept: CARDIOLOGY | Facility: CLINIC | Age: 84
End: 2024-01-31
Payer: MEDICARE

## 2024-01-31 DIAGNOSIS — R13.12 DYSPHAGIA, OROPHARYNGEAL: Primary | ICD-10-CM

## 2024-01-31 DIAGNOSIS — R13.10 DYSPHAGIA, UNSPECIFIED TYPE: ICD-10-CM

## 2024-01-31 PROCEDURE — 92611 MOTION FLUOROSCOPY/SWALLOW: CPT | Mod: GN,HCNC | Performed by: SPEECH-LANGUAGE PATHOLOGIST

## 2024-01-31 PROCEDURE — 25500020 PHARM REV CODE 255: Mod: HCNC | Performed by: NURSE PRACTITIONER

## 2024-01-31 PROCEDURE — A9698 NON-RAD CONTRAST MATERIALNOC: HCPCS | Mod: HCNC | Performed by: NURSE PRACTITIONER

## 2024-01-31 PROCEDURE — 74230 X-RAY XM SWLNG FUNCJ C+: CPT | Mod: 26,HCNC,, | Performed by: RADIOLOGY

## 2024-01-31 PROCEDURE — 74230 X-RAY XM SWLNG FUNCJ C+: CPT | Mod: TC,HCNC

## 2024-01-31 PROCEDURE — 93793 ANTICOAG MGMT PT WARFARIN: CPT | Mod: S$GLB,,,

## 2024-01-31 RX ADMIN — BARIUM SULFATE 100 ML: 0.81 POWDER, FOR SUSPENSION ORAL at 02:01

## 2024-02-02 NOTE — PROGRESS NOTES
"MODIFIED BARIUM SWALLOW STUDY    REASON FOR REFERRAL:  Mr. Dariel Urbina, age 83, was referred by OSWALD Muir, [facility], for a Modified Barium Swallow Study to rule out aspiration, assess his overall swallowing function, and determine safest consistencies for oral intake.  He was unaccompanied.    Mr. Urbina reported a history of several years of experiencing coughing about 10-15 minutes into a meal with solids or liquids.  He affirmed that this occurs while he is still eating, not after he has completed a meal. He stated that is sometimes feels like things close up.  He denied need for Heimlich maneuver or history of pna or unintended weight loss.    Per OSWALD Muir's history, "He coughs and chokes on water frequently. He coughs and chokes when eating solids as well.  This has been a problem for several years. His wife thinks that it is occurring more frequently but he does not think it is worse. He does not cough and clear his throat outside of meal times. He does not regurgitate food. He has not had pneumonia recently. He does not notice any heartburn or reflux. His voice has been hoarse with frequent pitch changes and cracks in his voice. He does not notice any vocal fatigue. He is breathing comfortably."       MEDICAL HISTORY:  Past Medical History:   Diagnosis Date    Anemia of chronic renal failure, stage 3 (moderate) 05/27/2015    Anticoagulant long-term use     Atherosclerosis of coronary artery bypass graft of native heart without angina pectoris 09/11/2012    3-27-18 [facility] Two vessel coronary artery disease.   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    Bilateral carotid artery disease 02/09/2017    Bleeding from the nose     Bleeding nose 03/21/2018    Cancer     Cataract     CHF (congestive heart failure)     CKD (chronic kidney disease) stage 3, GFR 30-59 ml/min 05/27/2015    Claudication of left lower extremity 09/17/2014    Colon polyp     Encounter for blood transfusion "     Gastroesophageal reflux disease without esophagitis 03/19/2018    Gastrointestinal hemorrhage associated with intestinal diverticulosis 04/01/2018    Glaucoma     H/O mechanical aortic valve replacement 09/17/2014    History of gout 09/26/2012    Hyperparathyroidism due to renal insufficiency 07/27/2015    Internal hemorrhoid 04/03/2018    Long term current use of anticoagulant therapy 09/26/2012    Mechanical heart valve present     Metabolic acidosis with normal anion gap and bicarbonate losses 03/20/2018    Mixed hyperlipidemia 09/26/2012    NSTEMI (non-ST elevated myocardial infarction) 03/21/2018    Obesity, diabetes, and hypertension syndrome 02/23/2016    Paroxysmal atrial fibrillation 02/06/2023    PVD (peripheral vascular disease) 09/11/2012    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        SURGICAL HISTORY:  Past Surgical History:   Procedure Laterality Date    BACK SURGERY      CARDIAC CATHETERIZATION      CARDIAC VALVE REPLACEMENT      CARDIAC VALVE SURGERY      CARPAL TUNNEL RELEASE Right 05/19/2020    Procedure: RELEASE, CARPAL TUNNEL;  Surgeon: Rupesh Norris Jr., MD;  Location: [facility];  Service: Plastics;  Laterality: Right;    CATARACT EXTRACTION Left 11/13/2022        COLON SURGERY      COLONOSCOPY N/A 03/31/2017    Procedure: COLONOSCOPY;  Surgeon: Bruno Raymond MD;  Location: [facility] (4TH FLR);  Service: Endoscopy;  Laterality: N/A;  Patient's wife requesting date.    COLONOSCOPY N/A 04/03/2018    Procedure: COLONOSCOPY;  Surgeon: Bonifacio Pelletier MD;  Location: [facility] (2ND FLR);  Service: Endoscopy;  Laterality: N/A;    COLONOSCOPY N/A 08/13/2018    Procedure: COLONOSCOPY;   Surgeon: Kam Barba MD;  Location: [facility] ENDO (2ND FLR);  Service: Endoscopy;  Laterality: N/A;  2nd floor: PA pressure 49; hx of moderate-severe valve disease     per Coumadin clinic-Patient can hold 5 days with lovenox bridge       ok to schedule per Katarina    CORONARY ANGIOGRAPHY N/A 10/04/2021    Procedure: Left heart cath +/- peripheral angiogram;  Surgeon: Jose Ruiz MD;  Location: [facility] CATH LAB;  Service: Cardiology;  Laterality: N/A;    CORONARY ANGIOGRAPHY N/A 3/2/2023    Procedure: Angiogram, Coronary;  Surgeon: Devon Garnica MD;  Location: [facility] CATH LAB;  Service: Cardiology;  Laterality: N/A;    CORONARY ANGIOPLASTY      CORONARY ARTERY BYPASS GRAFT      CORONARY BYPASS GRAFT ANGIOGRAPHY  10/04/2021    Procedure: Bypass graft study;  Surgeon: Jose Ruiz MD;  Location: [facility] CATH LAB;  Service: Cardiology;;    CORONARY BYPASS GRAFT ANGIOGRAPHY  3/2/2023    Procedure: Bypass graft study;  Surgeon: Devon Garnica MD;  Location: [facility] CATH LAB;  Service: Cardiology;;    ECHOCARDIOGRAM,TRANSESOPHAGEAL N/A 4/14/2023    Procedure: Transesophageal echo (KIRSTEN) intra-procedure log documentation;  Surgeon: [facility] Diagnostic Provider;  Location: [facility] EP LAB;  Service: Cardiology;  Laterality: N/A;    ENDOSCOPIC NASAL CAUTERIZATION Bilateral 11/3/2023    Procedure: CAUTERIZATION, NOSE, ENDOSCOPIC;  Surgeon: Jono Russell MD;  [facility];  Service: ENT;  Laterality: Bilateral;    ENDOSCOPIC NASAL CAUTERIZATION N/A 12/18/2023    Procedure: CAUTERIZATION, NOSE, ENDOSCOPIC;  Surgeon: Jono Russell MD;  [address]  Service: ENT;  Laterality: N/A;    EYE SURGERY      FUNCTIONAL ENDOSCOPIC SINUS SURGERY (FESS) Bilateral 6/14/2023    Procedure: FESS (FUNCTIONAL ENDOSCOPIC SINUS SURGERY);  Surgeon: Jono Russell MD;  Location: [facility] OR [address] [place] [address];  Service: ENT;  Laterality: Bilateral;    HERNIA REPAIR      INTRAOCULAR PROSTHESES INSERTION Left 11/13/2022    Procedure:  INSERTION, IOL PROSTHESIS;  Surgeon: Alia Mckeon MD;  [facility];  Service: Ophthalmology;  Laterality: Left;    INTRAOCULAR PROSTHESES INSERTION Right 12/04/2022    Procedure: INSERTION, IOL PROSTHESIS;  Surgeon: Alia Mckeon MD;  Location: [facility] OR 1ST FLR;  Service: Ophthalmology;  Laterality: Right;    LIGATION, ARTERY, SPHENOPALATINE, ENDOSCOPIC Bilateral 6/14/2023    Procedure: LIGATION, ARTERY, SPHENOPALATINE, ENDOSCOPIC;  Surgeon: Jono Russell MD;  Location: [facility] OR 2ND FLR;  Service: ENT;  Laterality: Bilateral;    PHACOEMULSIFICATION OF CATARACT Left 11/13/2022    Procedure: PHACOEMULSIFICATION, CATARACT;  Surgeon: Alia Mckeon MD;  Location: [facility] OR [address] [place] [address];  Service: Ophthalmology;  Laterality: Left;    PHACOEMULSIFICATION OF CATARACT Right 12/04/2022    Procedure: PHACOEMULSIFICATION, CATARACT;  Surgeon: Alia Mckeon MD;  Location: [address];  Service: Ophthalmology;  Laterality: Right;    SPINE SURGERY      TRANSESOPHAGEAL ECHOCARDIOGRAPHY N/A 3/22/2023    Procedure: ECHOCARDIOGRAM, TRANSESOPHAGEAL;  Surgeon: [facility] Diagnostic Provider;  Location: [facility] EP LAB;  Service: Anesthesiology;  Laterality: N/A;    TRANSESOPHAGEAL ECHOCARDIOGRAPHY N/A 4/11/2023    Procedure: ECHOCARDIOGRAM, TRANSESOPHAGEAL;  Surgeon: [facility] Diagnostic Provider;  Location: [facility] EP LAB;  Service: Anesthesiology;  Laterality: N/A;    VASECTOMY         SWALLOWING HISTORY:  As above.  Takes a regular consistency diet with thin liquids and pills with liquids.    FAMILY HISTORY:  Family History   Problem Relation Age of Onset    Heart failure Mother     Heart disease Mother     Heart failure Father     Heart disease Father     Alcohol abuse Father     Heart failure Brother     Heart disease Brother     Diabetes Brother     No Known Problems Sister     No Known Problems Maternal Grandmother     No Known Problems Maternal Grandfather     No Known Problems Paternal Grandmother     No Known  Problems Paternal Grandfather     Heart disease Sister     No Known Problems Maternal Aunt     No Known Problems Maternal Uncle     No Known Problems Paternal Aunt     No Known Problems Paternal Uncle     Amblyopia Neg Hx     Blindness Neg Hx     Cancer Neg Hx     Cataracts Neg Hx     Glaucoma Neg Hx     Hypertension Neg Hx     Macular degeneration Neg Hx     Retinal detachment Neg Hx     Strabismus Neg Hx     Stroke Neg Hx     Thyroid disease Neg Hx     Anemia Neg Hx     Arrhythmia Neg Hx     Asthma Neg Hx     Clotting disorder Neg Hx     Fainting Neg Hx     Heart attack Neg Hx     Hyperlipidemia Neg Hx     Atrial Septal Defect Neg Hx     Melanoma Neg Hx         BEHAVIOR:  Dariel Urbina was a very pleasant man who had normal affect and social interaction.  he was able to fully cooperate during the study.  Results of today's assessment were considered indicative of his current levels of swallowing functioning.      HEARING:  Subjectively, within normal limits.     ORAL PERIPHERAL:   Informal examination of the oral mechanism revealed structures and functioning within normal limits for swallowing and speech purposes.    Voice quality was within normal limits with no wet quality before, during, or after the study.      TEST FINDINGS:   Mr. Urbina was seen in Radiology with the radiology technician (Radiologist available for in-person input during a study as needed) for a Modified Barium Swallow Study.  He was seated on a chair for a left lateral videofluoroscopic view.      Consistencies assessed using radiopaque barium contrast:  thin (single and continuous swallows via open cup),   thin puree (1-teaspoon boluses) via spoon,   thick puree (1-teaspoon boluses) via spoon,   solid (whole cracker boluses) by Mr. Urbina's hand, and   13 mm barium tablet with water.    Strategies:  Dry swallow -- cleared solid residue  Liquid wash -- cleared solid residue, but resulted in cough as well.    Phases:  Oral:    Ciara was able to obtain liquid and strip utensils well with no loss of material from the oral cavity.  He moved boluses through the oral cavity with appropriate transit time.  There was no pooling of liquids in the mouth.  Swallow reflex was triggered  after/as liquids reached valleculae and pyriforms and after solids reached the valleculae .    Pharyngeal:  Boluses typically moved through the pharyngeal phase with no laryngeal penetration and no aspiration and no nasal regurgitation; however, there was one instance of trace aspiration of valleculae residue expressed into the airway as Mr. Urbina was in the act of swallowing his next bolus.  He verbalized that the sensation he experienced at that point was what he had described earlier, as though something was closing up.  Since he was in the act of swallowing, he appeared to inhibit a cough response to the small volume that was aspirated, but per his report, he sensed it and had some level of contraction response to it.  There was also pharyngeal residue on the epiglottis or in the valleculae and/or hypopharynx with solids, the greatest with masticated solid.  When a small sip of liquid was added to oral preparation of masticated solids, there was no pharyngeal residue, but there was a cough suggesting that the volume of water used had been too great.    - Delayed initiation (see above)  - Adequate soft palate elevation  - Adequate laryngeal elevation and anterior hyoid excursion  - Reduced tongue base retraction  - Complete epiglottic inversion, but after trace volume of thin liquid entered space between ventral surface of epiglottis and arytenoid complex.  - Complete laryngeal vestibular closure, but see above.  Resulted in routine flash penetration of thin liquids.  - Reduced pharyngeal stripping wave  - Adequate PE segment opening.  -   Trace  pharyngeal residue of thin liquids in BOT, valleculae, pyriforms, hypopharynx, and posterior pharyngeal wall.   Cleared well with dry swallow.   Trace residue on epiglottis tip with thick puree.  Cleared well with dry swallow.   Significant residue in valleculae and hypopharyx with masticated solid not mixed with liquid during oral preparation.  Required 3-4 liquid washes to clear.  When sip of liquid was added to oral preparation, there was no pharyngeal residue, but, as noted above, there was a cough, suggesting the sip of liquid had been too large for the task.      Cervical Esophageal:  Boluses entered the upper esophagus within normal limits.  The cricopharyngeus was visualized, but was not obstructive with any consistency or the barium tablet.    Rosenbeck 8-point Penetration-Aspiration Scale:   Best: 1 - Material does not enter airway.  Worst:   8 - Material enters the airway, passes below the vocal folds, and no effort is made to eject.  -- trace volume expressed from valleculae residue on subsequent swallow      IMPRESSIONS:   This 83 y.o. man appears to present with  1.  Mild to mild-moderate oropharyngeal dysphagia characterized by premature spillage of liquids resulting in consistent flash laryngeal penetration and reduced pharyngeal contraction resulting in pharyngeal residue (trace thin liquid and thick puree, significant masticated solid).  A trace volume of thin liquid residue was aspirated (x1) on a subsequent swallow when the swallow contraction expressed the fluid from the valleculae into the airway.      RECOMMENDATIONS/PLAN OF CARE:   It is felt that Mr. Urbina would benefit from  1.  Continuation of his current regular consistency diet with thin liquids using the following strategies and common aspiration precautions, including, but not limited to  A.  Appropriate upright seating for all eating and drinking.  B.  Preparing foods to a tender, moist state and/or adding moisture (e.g., condiments, gravies, sauces, rouxs) and/or addition a tiny sip of liquid to the oral cavity during mastication to  facilitate breakdown of the food and clearance through the pharynx.  C.  Dry swallows after liquids.  D.  Liquid wash (small sips) to help clear solid residue.  E.  Pills with liquids or embedded in puree.  F.  Monitoring for any signs/symptoms of aspiration (such as wet/gurgly voice that does not clear with coughing, inability to make any voice sounds, any persistent coughing with oral intake, otherwise unexplained fever, unexplained increased or new difficulty or discomfort breathing, unexplained increase in sleepiness/lethargy/significant fatigue, unexplained increase or new onset confusion or change in cognitive functioning, or any other unexplained change in health or well-being that could be related to swallowing).   2.  Consider short course of ST (1-2 visits) for training in above and in swallowing exercises to strengthen swallow contraction, including, but not limited to    A.  Effortful swallow   B.  CTAR or Shaker  3.  Continued f/u with OSWALD Muir as directed.    Long-term goals:  Mr. Urbina will be able to consume a regular consistency diet using safe swallowing strategies and eliminate or markedly reduced instances of coughing/choking.    Short-term objectives:  Mr. Urbina will perform the following with % accuracy/consistency:   Swallowing exercises  Effortful swallow  CTAR or Shaker  Safe swallowing strategies  Add moisture  Dry swallows after liquids  Liquid wash after solids

## 2024-02-02 NOTE — PLAN OF CARE
IMPRESSIONS:   This 83 y.o. man appears to present with  1.  Mild to mild-moderate oropharyngeal dysphagia characterized by premature spillage of liquids resulting in consistent flash laryngeal penetration and reduced pharyngeal contraction resulting in pharyngeal residue (trace thin liquid and thick puree, significant masticated solid).  A trace volume of thin liquid residue was aspirated (x1) on a subsequent swallow when the swallow contraction expressed the fluid from the valleculae into the airway.      RECOMMENDATIONS/PLAN OF CARE:   It is felt that Mr. Urbina would benefit from  1.  Continuation of his current regular consistency diet with thin liquids using the following strategies and common aspiration precautions, including, but not limited to  A.  Appropriate upright seating for all eating and drinking.  B.  Preparing foods to a tender, moist state and/or adding moisture (e.g., condiments, gravies, sauces, rouxs) and/or addition a tiny sip of liquid to the oral cavity during mastication to facilitate breakdown of the food and clearance through the pharynx.  C.  Dry swallows after liquids.  D.  Liquid wash (small sips) to help clear solid residue.  E.  Pills with liquids or embedded in puree.  F.  Monitoring for any signs/symptoms of aspiration (such as wet/gurgly voice that does not clear with coughing, inability to make any voice sounds, any persistent coughing with oral intake, otherwise unexplained fever, unexplained increased or new difficulty or discomfort breathing, unexplained increase in sleepiness/lethargy/significant fatigue, unexplained increase or new onset confusion or change in cognitive functioning, or any other unexplained change in health or well-being that could be related to swallowing).   2.  Consider short course of ST (1-2 visits) for training in above and in swallowing exercises to strengthen swallow contraction, including, but not limited to    A.  Effortful swallow   B.  CTAR or  Abelardo  3.  Continued f/u with NP Isadora as directed.    Long-term goals:  Mr. Urbina will be able to consume a regular consistency diet using safe swallowing strategies and eliminate or markedly reduced instances of coughing/choking.    Short-term objectives:  Mr. Urbina will perform the following with % accuracy/consistency:   Swallowing exercises  Effortful swallow  CTAR or Shaker  Safe swallowing strategies  Add moisture  Dry swallows after liquids  Liquid wash after solids

## 2024-02-05 ENCOUNTER — LAB VISIT (OUTPATIENT)
Dept: LAB | Facility: HOSPITAL | Age: 84
End: 2024-02-05
Attending: INTERNAL MEDICINE
Payer: MEDICARE

## 2024-02-05 ENCOUNTER — ANTI-COAG VISIT (OUTPATIENT)
Dept: CARDIOLOGY | Facility: CLINIC | Age: 84
End: 2024-02-05
Payer: MEDICARE

## 2024-02-05 DIAGNOSIS — Z95.2 H/O MECHANICAL AORTIC VALVE REPLACEMENT: ICD-10-CM

## 2024-02-05 DIAGNOSIS — Z79.01 ANTICOAGULANT LONG-TERM USE: ICD-10-CM

## 2024-02-05 DIAGNOSIS — I48.0 PAROXYSMAL ATRIAL FIBRILLATION: ICD-10-CM

## 2024-02-05 DIAGNOSIS — D64.9 ANEMIA, UNSPECIFIED TYPE: ICD-10-CM

## 2024-02-05 LAB
BASOPHILS # BLD AUTO: 0.05 K/UL (ref 0–0.2)
BASOPHILS NFR BLD: 1 % (ref 0–1.9)
DIFFERENTIAL METHOD BLD: ABNORMAL
EOSINOPHIL # BLD AUTO: 0.2 K/UL (ref 0–0.5)
EOSINOPHIL NFR BLD: 4.8 % (ref 0–8)
ERYTHROCYTE [DISTWIDTH] IN BLOOD BY AUTOMATED COUNT: 14.5 % (ref 11.5–14.5)
HCT VFR BLD AUTO: 35.6 % (ref 40–54)
HGB BLD-MCNC: 11.2 G/DL (ref 14–18)
IMM GRANULOCYTES # BLD AUTO: 0.01 K/UL (ref 0–0.04)
IMM GRANULOCYTES NFR BLD AUTO: 0.2 % (ref 0–0.5)
INR PPP: 2.3 (ref 0.8–1.2)
LYMPHOCYTES # BLD AUTO: 1.4 K/UL (ref 1–4.8)
LYMPHOCYTES NFR BLD: 28.5 % (ref 18–48)
MCH RBC QN AUTO: 30.9 PG (ref 27–31)
MCHC RBC AUTO-ENTMCNC: 31.5 G/DL (ref 32–36)
MCV RBC AUTO: 98 FL (ref 82–98)
MONOCYTES # BLD AUTO: 0.6 K/UL (ref 0.3–1)
MONOCYTES NFR BLD: 11.3 % (ref 4–15)
NEUTROPHILS # BLD AUTO: 2.7 K/UL (ref 1.8–7.7)
NEUTROPHILS NFR BLD: 54.2 % (ref 38–73)
NRBC BLD-RTO: 0 /100 WBC
PLATELET # BLD AUTO: 162 K/UL (ref 150–450)
PMV BLD AUTO: 12.1 FL (ref 9.2–12.9)
PROTHROMBIN TIME: 23.1 SEC (ref 9–12.5)
RBC # BLD AUTO: 3.62 M/UL (ref 4.6–6.2)
WBC # BLD AUTO: 5.05 K/UL (ref 3.9–12.7)

## 2024-02-05 PROCEDURE — 93793 ANTICOAG MGMT PT WARFARIN: CPT | Mod: S$GLB,,,

## 2024-02-05 PROCEDURE — 85025 COMPLETE CBC W/AUTO DIFF WBC: CPT | Mod: HCNC | Performed by: INTERNAL MEDICINE

## 2024-02-05 PROCEDURE — 85610 PROTHROMBIN TIME: CPT | Mod: HCNC | Performed by: INTERNAL MEDICINE

## 2024-02-05 PROCEDURE — 36415 COLL VENOUS BLD VENIPUNCTURE: CPT | Mod: HCNC | Performed by: INTERNAL MEDICINE

## 2024-02-06 ENCOUNTER — OFFICE VISIT (OUTPATIENT)
Dept: OTOLARYNGOLOGY | Facility: CLINIC | Age: 84
End: 2024-02-06
Payer: MEDICARE

## 2024-02-06 VITALS
WEIGHT: 170 LBS | BODY MASS INDEX: 27.43 KG/M2 | DIASTOLIC BLOOD PRESSURE: 63 MMHG | HEART RATE: 59 BPM | SYSTOLIC BLOOD PRESSURE: 152 MMHG

## 2024-02-06 DIAGNOSIS — J34.89 NASAL SEPTAL PERFORATION: ICD-10-CM

## 2024-02-06 DIAGNOSIS — N18.32 STAGE 3B CHRONIC KIDNEY DISEASE: ICD-10-CM

## 2024-02-06 DIAGNOSIS — Z79.01 ANTICOAGULANT LONG-TERM USE: ICD-10-CM

## 2024-02-06 DIAGNOSIS — R04.0 EPISTAXIS: Primary | ICD-10-CM

## 2024-02-06 PROCEDURE — 1160F RVW MEDS BY RX/DR IN RCRD: CPT | Mod: HCNC,CPTII,S$GLB, | Performed by: STUDENT IN AN ORGANIZED HEALTH CARE EDUCATION/TRAINING PROGRAM

## 2024-02-06 PROCEDURE — 31231 NASAL ENDOSCOPY DX: CPT | Mod: HCNC,S$GLB,, | Performed by: STUDENT IN AN ORGANIZED HEALTH CARE EDUCATION/TRAINING PROGRAM

## 2024-02-06 PROCEDURE — 3078F DIAST BP <80 MM HG: CPT | Mod: HCNC,CPTII,S$GLB, | Performed by: STUDENT IN AN ORGANIZED HEALTH CARE EDUCATION/TRAINING PROGRAM

## 2024-02-06 PROCEDURE — 1159F MED LIST DOCD IN RCRD: CPT | Mod: HCNC,CPTII,S$GLB, | Performed by: STUDENT IN AN ORGANIZED HEALTH CARE EDUCATION/TRAINING PROGRAM

## 2024-02-06 PROCEDURE — 99999 PR PBB SHADOW E&M-EST. PATIENT-LVL III: CPT | Mod: PBBFAC,HCNC,, | Performed by: STUDENT IN AN ORGANIZED HEALTH CARE EDUCATION/TRAINING PROGRAM

## 2024-02-06 PROCEDURE — 3077F SYST BP >= 140 MM HG: CPT | Mod: HCNC,CPTII,S$GLB, | Performed by: STUDENT IN AN ORGANIZED HEALTH CARE EDUCATION/TRAINING PROGRAM

## 2024-02-06 PROCEDURE — 1126F AMNT PAIN NOTED NONE PRSNT: CPT | Mod: HCNC,CPTII,S$GLB, | Performed by: STUDENT IN AN ORGANIZED HEALTH CARE EDUCATION/TRAINING PROGRAM

## 2024-02-06 PROCEDURE — 99213 OFFICE O/P EST LOW 20 MIN: CPT | Mod: 25,HCNC,S$GLB, | Performed by: STUDENT IN AN ORGANIZED HEALTH CARE EDUCATION/TRAINING PROGRAM

## 2024-02-07 ENCOUNTER — DOCUMENTATION ONLY (OUTPATIENT)
Dept: REHABILITATION | Facility: HOSPITAL | Age: 84
End: 2024-02-07
Payer: MEDICARE

## 2024-02-07 DIAGNOSIS — R13.12 OROPHARYNGEAL DYSPHAGIA: Primary | ICD-10-CM

## 2024-02-07 NOTE — PROGRESS NOTES
[facility]  Speech Therapy Discharge Note    Name: Dariel Urbina  [facility] Number: [id number]    Therapy Diagnosis:   Encounter Diagnosis   Name Primary?    Oropharyngeal dysphagia Yes     Physician: Sergei Ayon MD  Physician Orders: Ambulatory Referral to Speech Therapy   Medical Diagnosis: R13.10 (ICD-10-CM) - Dysphagia, unspecified type      Visit # / Visits Authorized:  1 / 1   Date of Evaluation:  11/21/2023      Date of Last visit: 11/21/2023 (evaluation)   Total Visits Received: 1 evaluation only    ASSESSMENT          Discharge reason: Patient has met all of his/her goals    Short Term Goals: (2 weeks) Current Progress:   Patient will participate in a Modified Barium Swallow Study. Specific goals will depend on results of swallowing test.       Met 1/31/2024         Long Term Goals: (4 weeks) Current Progress:   Patient will maintain adequate hydration/nutrition with optimum safety and efficiency of swallowing function on PO intake without overt signs and symptoms of aspiration for regular diet level.     progressing    2. Patient  will utilize compensatory strategies with optimum safety and efficiency of swallowing function on PO intake without overt signs and symptoms of aspiration for regular diet level.      progressing       PLAN   This patient is discharged from Speech Therapy      ANITA Tobar, CCC-SLP, CBIS

## 2024-02-07 NOTE — PROGRESS NOTES
Subjective:      Dariel is a 83 y.o. male who comes for follow-up of  epistaxis .  His last visit with me was on 1/2/2024. He previously undergone bilateral SP ligation followed by bilateral embolization, cautery on the left septum for persistent bleeding on 11/03/2023 and again recently on 12/18/23 after he was supratherapeutic on his coumadin.        He was been doing well since his last visit.  He does report some minimal episodic bleeding that was controlled with p.r.n. Afrin.  He denies any large volume episodes of epistaxis.  His last CBC shows a hemoglobin level of 11.2, up from 8.  His last INR was 2.3.     His current sinus regime consists of:  Nasal saline.    The patient's medications, allergies, past medical, surgical, social and family histories were reviewed and updated as appropriate.      A detailed review of systems was obtained with pertinent positives as per the above HPI, and otherwise negative.        Objective:     BP (!) 152/63 (BP Location: Left arm, Patient Position: Sitting, BP Method: Small (Automatic))   Pulse (!) 59   Wt 77.1 kg (169 lb 15.6 oz)   BMI 27.43 kg/m²        Constitutional:   Vital signs are normal. He appears well-developed and well-nourished.     Head:  Normocephalic and atraumatic.     Ears:  Hearing normal to normal and whispered voice; external ear normal without scars, lesions, or masses; ear canal, tympanic membrane, and middle ear normal..   Right Ear: No swelling. Tympanic membrane is not perforated and not bulging. No middle ear effusion.   Left Ear: No swelling. Tympanic membrane is not perforated and not bulging.  No middle ear effusion.     Nose:  Nose normal including turbinates, nasal mucosa, sinuses and nasal septum. No epistaxis.     Mouth/Throat  Oropharynx clear and moist without lesions or asymmetry and normal uvula midline. Normal dentition. No tonsillar abscesses. Tonsillar exudate.      Neck:  Neck normal without thyromegaly masses, asymmetry,  normal tracheal structure, crepitus, and tenderness, thyroid normal, trachea normal, phonation normal, full range of motion with neck supple and no adenopathy. No stridor present.        Head (right side): No submental adenopathy present.        Head (left side): No submental adenopathy present.     He has no cervical adenopathy.     Pulmonary/Chest:   No stridor.       Procedure    Nasal Endoscopy:  2/6/2024    The use of diagnostic nasal endoscopy was considered medically necessary for the evaluation and visualization of the nasal anatomy for symptoms suggestive of nasal or sinus origin. Physical examination (including a nasal speculum evaluation) did not provide sufficient clinical information to establish a diagnosis, or symptoms did not improve or worsened following treatment.     The nasal cavity was decongested with topical 1% phenylephrine and anesthetized with 4% lidocaine.  A rigid 0-degree endoscope was introduced into the nasal cavity.    The patient was seated in the examination chair. After discussion of risks and benefits, a nasal endoscope was inserted into the nose the endoscope was passed along the left nasal floor to the nasopharynx. It was then passed between the middle and superior meatus, nasal turbinates, nasal septum, nasopharynx and sphenoethmoid region. The nasal endoscope was withdrawn and there was no complications. An identical procedure was performed on the right side. I was present for the entire procedure.The patient tolerated the above procedure well. The findings of this procedure can be found in the dictated note from 2/6/2024 visit.                            Some persistent crusting mostly in the left nasal cavity in the middle meatus.  Stable small septal perforation anteriorly.   No evidence of active bleeding.  His nasal septum is much improved and has no longer evidence of mucosal edema and erythema.    Data Reviewed    WBC (K/uL)   Date Value   02/05/2024 5.05     Eosinophil  "% (%)   Date Value   02/05/2024 4.8     Eos # (K/uL)   Date Value   02/05/2024 0.2     Platelets (K/uL)   Date Value   02/05/2024 162     Glucose (mg/dL)   Date Value   12/19/2023 244 (H)     No results found for: "IGE"    No sinus imaging available.      Assessment:     1. Epistaxis    2. Nasal septal perforation    3. Anticoagulant long-term use    4. Stage 3b chronic kidney disease         Plan:     - Doing well  - Cont aggressive saline   - Cont Bacitracin ointment in nares at night  - RTC 1 month    Jono Russell MD   "

## 2024-02-08 ENCOUNTER — PATIENT MESSAGE (OUTPATIENT)
Dept: OTOLARYNGOLOGY | Facility: CLINIC | Age: 84
End: 2024-02-08
Payer: MEDICARE

## 2024-02-08 DIAGNOSIS — R13.10 DYSPHAGIA, UNSPECIFIED TYPE: Primary | ICD-10-CM

## 2024-02-14 DIAGNOSIS — Z79.01 ANTICOAGULANT LONG-TERM USE: ICD-10-CM

## 2024-02-14 DIAGNOSIS — Z95.2 H/O MECHANICAL AORTIC VALVE REPLACEMENT: ICD-10-CM

## 2024-02-14 RX ORDER — WARFARIN SODIUM 5 MG/1
TABLET ORAL
Qty: 35 TABLET | Refills: 3 | Status: SHIPPED | OUTPATIENT
Start: 2024-02-14 | End: 2024-04-24

## 2024-02-14 NOTE — TELEPHONE ENCOUNTER
----- Message from Mer Smith sent at 2/14/2024  8:02 AM CST -----  Contact: Patient @  [phone]  Requesting an RX refill or new RX.  Is this a refill or new RX:   RX name and strength warfarin (COUMADIN) 5 MG tablet  Is this a 30 day or 90 day RX:   Pharmacy name and phone #   [facility] - [place], [place] - [address] [facility]  [address]  [facility] [address]  Phone: [phone] Fax: [phone]

## 2024-02-19 ENCOUNTER — HOSPITAL ENCOUNTER (EMERGENCY)
Facility: HOSPITAL | Age: 84
Discharge: HOME | End: 2024-02-19
Attending: EMERGENCY MEDICINE
Payer: MEDICARE

## 2024-02-19 ENCOUNTER — ANTI-COAG VISIT (OUTPATIENT)
Dept: CARDIOLOGY | Facility: CLINIC | Age: 84
End: 2024-02-19
Payer: MEDICARE

## 2024-02-19 VITALS
TEMPERATURE: 98 F | RESPIRATION RATE: 18 BRPM | BODY MASS INDEX: 27.64 KG/M2 | HEIGHT: 66 IN | OXYGEN SATURATION: 97 % | SYSTOLIC BLOOD PRESSURE: 166 MMHG | WEIGHT: 172 LBS | DIASTOLIC BLOOD PRESSURE: 70 MMHG | HEART RATE: 56 BPM

## 2024-02-19 DIAGNOSIS — R04.0 EPISTAXIS: Primary | ICD-10-CM

## 2024-02-19 LAB
ALBUMIN SERPL BCP-MCNC: 3.4 G/DL (ref 3.5–5.2)
ALP SERPL-CCNC: 67 U/L (ref 55–135)
ALT SERPL W/O P-5'-P-CCNC: 11 U/L (ref 10–44)
ANION GAP SERPL CALC-SCNC: 8 MMOL/L (ref 8–16)
AST SERPL-CCNC: 14 U/L (ref 10–40)
BASOPHILS # BLD AUTO: 0.04 K/UL (ref 0–0.2)
BASOPHILS NFR BLD: 0.7 % (ref 0–1.9)
BILIRUB SERPL-MCNC: 0.4 MG/DL (ref 0.1–1)
BUN SERPL-MCNC: 51 MG/DL (ref 8–23)
CALCIUM SERPL-MCNC: 10.2 MG/DL (ref 8.7–10.5)
CHLORIDE SERPL-SCNC: 108 MMOL/L (ref 95–110)
CO2 SERPL-SCNC: 23 MMOL/L (ref 23–29)
CREAT SERPL-MCNC: 1.9 MG/DL (ref 0.5–1.4)
DIFFERENTIAL METHOD BLD: ABNORMAL
EOSINOPHIL # BLD AUTO: 0.2 K/UL (ref 0–0.5)
EOSINOPHIL NFR BLD: 2.6 % (ref 0–8)
ERYTHROCYTE [DISTWIDTH] IN BLOOD BY AUTOMATED COUNT: 14.1 % (ref 11.5–14.5)
EST. GFR  (NO RACE VARIABLE): 34.8 ML/MIN/1.73 M^2
GLUCOSE SERPL-MCNC: 229 MG/DL (ref 70–110)
HCT VFR BLD AUTO: 28.9 % (ref 40–54)
HGB BLD-MCNC: 9 G/DL (ref 14–18)
IMM GRANULOCYTES # BLD AUTO: 0.02 K/UL (ref 0–0.04)
IMM GRANULOCYTES NFR BLD AUTO: 0.3 % (ref 0–0.5)
INR PPP: 2.1 (ref 0.8–1.2)
LYMPHOCYTES # BLD AUTO: 0.8 K/UL (ref 1–4.8)
LYMPHOCYTES NFR BLD: 14.3 % (ref 18–48)
MCH RBC QN AUTO: 30.8 PG (ref 27–31)
MCHC RBC AUTO-ENTMCNC: 31.1 G/DL (ref 32–36)
MCV RBC AUTO: 99 FL (ref 82–98)
MONOCYTES # BLD AUTO: 0.4 K/UL (ref 0.3–1)
MONOCYTES NFR BLD: 7.3 % (ref 4–15)
NEUTROPHILS # BLD AUTO: 4.4 K/UL (ref 1.8–7.7)
NEUTROPHILS NFR BLD: 74.8 % (ref 38–73)
NRBC BLD-RTO: 0 /100 WBC
PLATELET # BLD AUTO: 139 K/UL (ref 150–450)
PMV BLD AUTO: 10.8 FL (ref 9.2–12.9)
POTASSIUM SERPL-SCNC: 4.7 MMOL/L (ref 3.5–5.1)
PROT SERPL-MCNC: 7 G/DL (ref 6–8.4)
PROTHROMBIN TIME: 21.3 SEC (ref 9–12.5)
RBC # BLD AUTO: 2.92 M/UL (ref 4.6–6.2)
SODIUM SERPL-SCNC: 139 MMOL/L (ref 136–145)
WBC # BLD AUTO: 5.88 K/UL (ref 3.9–12.7)

## 2024-02-19 PROCEDURE — 25000003 PHARM REV CODE 250: Mod: HCNC | Performed by: PHYSICIAN ASSISTANT

## 2024-02-19 PROCEDURE — 99283 EMERGENCY DEPT VISIT LOW MDM: CPT | Mod: HCNC

## 2024-02-19 PROCEDURE — 85025 COMPLETE CBC W/AUTO DIFF WBC: CPT | Mod: HCNC | Performed by: EMERGENCY MEDICINE

## 2024-02-19 PROCEDURE — 85610 PROTHROMBIN TIME: CPT | Mod: HCNC | Performed by: EMERGENCY MEDICINE

## 2024-02-19 PROCEDURE — 93793 ANTICOAG MGMT PT WARFARIN: CPT | Mod: S$GLB,,,

## 2024-02-19 PROCEDURE — 80053 COMPREHEN METABOLIC PANEL: CPT | Mod: HCNC | Performed by: EMERGENCY MEDICINE

## 2024-02-19 RX ORDER — OXYMETAZOLINE HCL 0.05 %
2 SPRAY, NON-AEROSOL (ML) NASAL CONTINUOUS PRN
Status: DISCONTINUED | OUTPATIENT
Start: 2024-02-19 | End: 2024-02-19 | Stop reason: HOSPADM

## 2024-02-19 RX ADMIN — OXYMETAZOLINE HCL 2 SPRAY: 0.05 SPRAY NASAL at 02:02

## 2024-02-19 NOTE — ED NOTES
Patient states left nare nosebleed onset this am, Coumadin last night, no bleeding upon admit to INTAKE, states he does not hold his nose whern bleeding starts , taking Afrin to day

## 2024-02-19 NOTE — ED PROVIDER NOTES
Encounter Date: 2/19/2024       History     Chief Complaint   Patient presents with    Epistaxis     On coumadin , stopped now, last  night passing clots,  last time got blood transfusion     53-year-old male with past medical history of CKD, CHF, paroxysmal atrial fibrillation (on Eliquis), bilateral carotid artery disease, DM T2, PVD who presents to the ED for epistaxis.  States for the past week has been having recurrent epistaxis from the left Ch.  States it does occasionally come from the right nares well as the posterior oropharynx.  States he pulls large clots out and has been using Afrin and direct pressure to manage it.  States this morning around midnight the bleeding was worse than usual.  States he used Afrin at least 10 times and was finally able to get it to resolve just prior to arrival to the ED.  States he required blood transfusions for epistaxis in the past.    The history is provided by the patient.     Review of patient's allergies indicates:   Allergen Reactions    Lisinopril     Losartan      Intolerance- elevates potassium level     Past Medical History:   Diagnosis Date    Anemia of chronic renal failure, stage 3 (moderate) 05/27/2015    Anticoagulant long-term use     Atherosclerosis of coronary artery bypass graft of native heart without angina pectoris 09/11/2012    3-27-18 [facility] Two vessel coronary artery disease.   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    Bilateral carotid artery disease 02/09/2017    Bleeding from the nose     Bleeding nose 03/21/2018    Cancer     Cataract     CHF (congestive heart failure)     CKD (chronic kidney disease) stage 3, GFR 30-59 ml/min 05/27/2015    Claudication of left lower extremity 09/17/2014    Colon polyp     Encounter for blood transfusion     Gastroesophageal reflux disease without esophagitis 03/19/2018    Gastrointestinal hemorrhage associated with intestinal diverticulosis 04/01/2018    Glaucoma     H/O mechanical  aortic valve replacement 09/17/2014    History of gout 09/26/2012    Hyperparathyroidism due to renal insufficiency 07/27/2015    Internal hemorrhoid 04/03/2018    Long term current use of anticoagulant therapy 09/26/2012    Mechanical heart valve present     Metabolic acidosis with normal anion gap and bicarbonate losses 03/20/2018    Mixed hyperlipidemia 09/26/2012    NSTEMI (non-ST elevated myocardial infarction) 03/21/2018    Obesity, diabetes, and hypertension syndrome 02/23/2016    Paroxysmal atrial fibrillation 02/06/2023    PVD (peripheral vascular disease) 09/11/2012    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     Past Surgical History:   Procedure Laterality Date    BACK SURGERY      CARDIAC CATHETERIZATION      CARDIAC VALVE REPLACEMENT      CARDIAC VALVE SURGERY      CARPAL TUNNEL RELEASE Right 05/19/2020    Procedure: RELEASE, CARPAL TUNNEL;  Surgeon: Rupesh Norris Jr., MD;  Location: [facility];  Service: Plastics;  Laterality: Right;    CATARACT EXTRACTION Left 11/13/2022        COLON SURGERY      COLONOSCOPY N/A 03/31/2017    Procedure: COLONOSCOPY;  Surgeon: Bruno Raymond MD;  Location: [facility] (4TH FLR);  Service: Endoscopy;  Laterality: N/A;  Patient's wife requesting date.    COLONOSCOPY N/A 04/03/2018    Procedure: COLONOSCOPY;  Surgeon: Bonifacio Pelletier MD;  Location: [facility] (2ND FLR);  Service: Endoscopy;  Laterality: N/A;    COLONOSCOPY N/A 08/13/2018    Procedure: COLONOSCOPY;  Surgeon: Kam Barba MD;  Location: [facility] ENDO (2ND FLR);  Service: Endoscopy;  Laterality: N/A;  2nd floor: PA pressure 49; hx of moderate-severe valve disease     per Coumadin clinic-Patient can hold  5 days with lovenox bridge       ok to schedule per Katarina    CORONARY ANGIOGRAPHY N/A 10/04/2021    Procedure: Left heart cath +/- peripheral angiogram;  Surgeon: Jose Ruiz MD;  Location: [facility] CATH LAB;  Service: Cardiology;  Laterality: N/A;    CORONARY ANGIOGRAPHY N/A 3/2/2023    Procedure: Angiogram, Coronary;  Surgeon: Devon Garnica MD;  Location: [facility] CATH LAB;  Service: Cardiology;  Laterality: N/A;    CORONARY ANGIOPLASTY      CORONARY ARTERY BYPASS GRAFT      CORONARY BYPASS GRAFT ANGIOGRAPHY  10/04/2021    Procedure: Bypass graft study;  Surgeon: Jose Ruiz MD;  Location: [facility] CATH LAB;  Service: Cardiology;;    CORONARY BYPASS GRAFT ANGIOGRAPHY  3/2/2023    Procedure: Bypass graft study;  Surgeon: Devon Garnica MD;  Location: [facility] CATH LAB;  Service: Cardiology;;    ECHOCARDIOGRAM,TRANSESOPHAGEAL N/A 4/14/2023    Procedure: Transesophageal echo (KIRSTEN) intra-procedure log documentation;  Surgeon: [facility] Diagnostic Provider;  Location: [facility] EP LAB;  Service: Cardiology;  Laterality: N/A;    ENDOSCOPIC NASAL CAUTERIZATION Bilateral 11/3/2023    Procedure: CAUTERIZATION, NOSE, ENDOSCOPIC;  Surgeon: Jono Russell MD;  [facility];  Service: ENT;  Laterality: Bilateral;    ENDOSCOPIC NASAL CAUTERIZATION N/A 12/18/2023    Procedure: CAUTERIZATION, NOSE, ENDOSCOPIC;  Surgeon: Jono Russell MD;  Location: [facility] OR 2ND FLR;  Service: ENT;  Laterality: N/A;    EYE SURGERY      FUNCTIONAL ENDOSCOPIC SINUS SURGERY (FESS) Bilateral 6/14/2023    Procedure: FESS (FUNCTIONAL ENDOSCOPIC SINUS SURGERY);  Surgeon: Jono Russell MD;  Location: [facility] OR 2ND FLR;  Service: ENT;  Laterality: Bilateral;    HERNIA REPAIR      INTRAOCULAR PROSTHESES INSERTION Left 11/13/2022    Procedure: INSERTION, IOL PROSTHESIS;  Surgeon: Alia Mckeon MD;  Location: [facility] OR 1ST FLR;  Service: Ophthalmology;  Laterality: Left;    INTRAOCULAR PROSTHESES INSERTION Right 12/04/2022    Procedure: INSERTION,  IOL PROSTHESIS;  Surgeon: Alia Mckeon MD;  Location: [facility] OR 1ST FLR;  Service: Ophthalmology;  Laterality: Right;    LIGATION, ARTERY, SPHENOPALATINE, ENDOSCOPIC Bilateral 6/14/2023    Procedure: LIGATION, ARTERY, SPHENOPALATINE, ENDOSCOPIC;  Surgeon: Jono Russell MD;  Location: [facility] OR 2ND FLR;  Service: ENT;  Laterality: Bilateral;    PHACOEMULSIFICATION OF CATARACT Left 11/13/2022    Procedure: PHACOEMULSIFICATION, CATARACT;  Surgeon: Alia Mckeon MD;  Location: [facility] OR 1ST FLR;  Service: Ophthalmology;  Laterality: Left;    PHACOEMULSIFICATION OF CATARACT Right 12/04/2022    Procedure: PHACOEMULSIFICATION, CATARACT;  Surgeon: Alia Mckeon MD;  Location: [facility] OR [address] [place] [address];  Service: Ophthalmology;  Laterality: Right;    SPINE SURGERY      TRANSESOPHAGEAL ECHOCARDIOGRAPHY N/A 3/22/2023    Procedure: ECHOCARDIOGRAM, TRANSESOPHAGEAL;  Surgeon: Thuan Diagnostic Provider;  Location: [facility] EP LAB;  Service: Anesthesiology;  Laterality: N/A;    TRANSESOPHAGEAL ECHOCARDIOGRAPHY N/A 4/11/2023    Procedure: ECHOCARDIOGRAM, TRANSESOPHAGEAL;  Surgeon: Dosc Diagnostic Provider;  Location: [facility] EP LAB;  Service: Anesthesiology;  Laterality: N/A;    VASECTOMY       Family History   Problem Relation Age of Onset    Heart failure Mother     Heart disease Mother     Heart failure Father     Heart disease Father     Alcohol abuse Father     Heart failure Brother     Heart disease Brother     Diabetes Brother     No Known Problems Sister     No Known Problems Maternal Grandmother     No Known Problems Maternal Grandfather     No Known Problems Paternal Grandmother     No Known Problems Paternal Grandfather     Heart disease Sister     No Known Problems Maternal Aunt     No Known Problems Maternal Uncle     No Known Problems Paternal Aunt     No Known Problems Paternal Uncle     Amblyopia Neg Hx     Blindness Neg Hx     Cancer Neg Hx     Cataracts Neg Hx     Glaucoma Neg Hx     Hypertension Neg Hx     Macular  degeneration Neg Hx     Retinal detachment Neg Hx     Strabismus Neg Hx     Stroke Neg Hx     Thyroid disease Neg Hx     Anemia Neg Hx     Arrhythmia Neg Hx     Asthma Neg Hx     Clotting disorder Neg Hx     Fainting Neg Hx     Heart attack Neg Hx     Hyperlipidemia Neg Hx     Atrial Septal Defect Neg Hx     Melanoma Neg Hx      Social History     Tobacco Use    Smoking status: Former     Current packs/day: 0.00     Average packs/day: 1 pack/day for 20.0 years (20.0 ttl pk-yrs)     Types: Cigarettes     Start date: 1960     Quit date: 1980     Years since quittin.4     Passive exposure: Never    Smokeless tobacco: Never   Substance Use Topics    Alcohol use: No    Drug use: No     Review of Systems   Constitutional:  Negative for chills and fever.   HENT:  Positive for nosebleeds. Negative for sore throat.    Respiratory:  Negative for cough and shortness of breath.    Cardiovascular:  Negative for chest pain.   Gastrointestinal:  Negative for abdominal pain.   Genitourinary:  Negative for difficulty urinating and dysuria.   Musculoskeletal: Negative.    Neurological:  Negative for weakness.   Psychiatric/Behavioral:  Negative for confusion.        Physical Exam     Initial Vitals [24 1140]   BP Pulse Resp Temp SpO2   (!) 178/72 60 18 97.9 °F (36.6 °C) 100 %      MAP       --         Physical Exam    Nursing note and vitals reviewed.  Constitutional: He appears well-developed and well-nourished.   HENT:   Head: Normocephalic and atraumatic.   There is a nasal septal perforation.  No active bleeding.  Remnants of blood in the posterior oropharynx.   Eyes: Conjunctivae are normal.   Neck: Neck supple.   Normal range of motion.  Cardiovascular:  Normal rate.           Pulmonary/Chest: Breath sounds normal.   Abdominal: Abdomen is soft. He exhibits no distension.   Musculoskeletal:         General: Normal range of motion.      Cervical back: Normal range of motion and neck supple.     Neurological:  He is alert and oriented to person, place, and time. GCS score is 15. GCS eye subscore is 4. GCS verbal subscore is 5. GCS motor subscore is 6.   Skin: Skin is warm and dry. Capillary refill takes less than 2 seconds.         ED Course   Procedures  Labs Reviewed   CBC W/ AUTO DIFFERENTIAL - Abnormal; Notable for the following components:       Result Value    RBC 2.92 (*)     Hemoglobin 9.0 (*)     Hematocrit 28.9 (*)     MCV 99 (*)     MCHC 31.1 (*)     Platelets 139 (*)     Lymph # 0.8 (*)     Gran % 74.8 (*)     Lymph % 14.3 (*)     All other components within normal limits   COMPREHENSIVE METABOLIC PANEL - Abnormal; Notable for the following components:    Glucose 229 (*)     BUN 51 (*)     Creatinine 1.9 (*)     Albumin 3.4 (*)     eGFR 34.8 (*)     All other components within normal limits   PROTIME-INR - Abnormal; Notable for the following components:    Prothrombin Time 21.3 (*)     INR 2.1 (*)     All other components within normal limits          Imaging Results    None          Medications   oxymetazoline 0.05 % nasal spray 2 spray (2 sprays Each Nostril New Bag 2/19/24 1421)     Medical Decision Making  83-year-old male presents to the emergency department for epistaxis.      Differential includes but not limited to anemia, anterior epistaxis, posterior epistaxis    Patient is anticoagulated on Coumadin.  INR is 2.1.  Has history of recurrent epistaxis requiring transfusions in the past.  Hemoglobin today is 9.1.  No indication for emergent transfusion.  Reports significant bleeding since midnight and which was ultimately controlled direct pressure and Afrin prior to come to the ED. on exam there is a nasal septal perforation unable to visualize the source of bleeding.  Was monitored in the ED without reoccurrence of his epistaxis.  He was given Afrin and instructions on had a manage if this reoccurs.  Spoke with ENT who will help arrange close follow-up in clinic.  Discussed return ED  precautions.    Amount and/or Complexity of Data Reviewed  Labs:  Decision-making details documented in ED Course.    Risk  OTC drugs.               ED Course as of 02/19/24 1552   Mon Feb 19, 2024   1550 INR(!): 2.1 [HJ]   1550 Creatinine(!): 1.9  Improved from his baseline [HJ]   1550 Hemoglobin(!): 9.0  No indication for transfusion. [HJ]      ED Course User Index  [] Marques Espinoza PA-C                           Clinical Impression:  Final diagnoses:  [R04.0] Epistaxis (Primary)          ED Disposition Condition    Discharge Stable          ED Prescriptions    None       Follow-up Information       Follow up With Specialties Details Why Contact Info Additional Information    [facility] - [facility] Otolaryngology Schedule an appointment as soon as possible for a visit   [address] [facility] [id number] [phone] Ear, Nose & Throat Services - Main Building, 4th Floor Please park in [facility] and use Clinic elevator    [facility] - Emergency Dept Emergency Medicine Go to  If symptoms worsen [address] [facility] [phone]              Marques Espinoza PA-C  02/19/24 1554

## 2024-02-19 NOTE — FIRST PROVIDER EVALUATION
"Medical screening examination initiated.  I have conducted a focused provider triage encounter, findings are as follows:    Brief history of present illness:  Epistaxis, on coumadin.  No trauma. Just stopped, but 'filled a bag' this morning.     Vitals:    02/19/24 1140   BP: (!) 178/72   Pulse: 60   Resp: 18   Temp: 97.9 °F (36.6 °C)   TempSrc: Oral   SpO2: 100%   Weight: 78 kg (172 lb)   Height: 5' 6" (1.676 m)       Pertinent physical exam:  No active epistaxis     Brief workup plan:  Ordered for lab work.     Preliminary workup initiated; this workup will be continued and followed by the physician or advanced practice provider that is assigned to the patient when roomed.  "

## 2024-02-19 NOTE — ED NOTES
Patient identifiers verified and correct for  Mr Urbina  C/C:  NOsebleed SEE NN  APPEARANCE: awake and alert in NAD. PAIN  0/10  SKIN: warm, dry and intact. No breakdown or bruising.  MUSCULOSKELETAL: Patient moving all extremities spontaneously, no obvious swelling or deformities noted. Ambulates independently.  RESPIRATORY: Denies shortness of breath.Respirations unlabored.   CARDIAC: Denies CP, 2+ distal pulses; no peripheral edema  ABDOMEN: S/ND/NT, Denies nausea  : voids spontaneously, denies difficulty  Neurologic: AAO x 4; follows commands equal strength in all extremities; denies numbness/tingling. Denies dizziness  Denises nerw weakness

## 2024-02-19 NOTE — PROGRESS NOTES
[facility]    2024 4:44 PM    Assessment/Plan:    Patient presents today with therapeutic INR.    Assessment of patient findings and chart review: Of note pt recently seen in ED for epistaxis (as he has required blood transfusions in the past).  D/C w/ close f/u w/ ENT.     Recommendation for patient's warfarin regimen: Continue current maintenance dose    Recommend repeat INR in 2 weeks  _________________________________________________________________    Dariel Urbina (83 y.o.) is followed by the [facility].    Anticoagulation Summary  As of 2024      INR goal:  2.0-3.0   TTR:  68.5 % (11.5 y)   INR used for dosin.1 (2024)   Warfarin maintenance plan:  7.5 mg (5 mg x 1.5) every Thu; 5 mg (5 mg x 1) all other days   Weekly warfarin total:  37.5 mg   Plan last modified:  Laurie Patino, PharmD (2024)   Next INR check:     Target end date:      Indications    Anticoagulant long-term use (Resolved) [Z79.01]  H/O mechanical aortic valve replacement (Resolved) [Z95.2]                 Anticoagulation Episode Summary       INR check location:  Clinic Lab    Preferred lab:      Send INR reminders to:  [facility] COUMADIN MONITORING POOL    Comments:  [facility] - Internal Med Clinic only Mon - Thu // carpel tunnel release  - target low end of range          Anticoagulation Care Providers       Provider Role Specialty Phone number    Devon Garnica MD [facility] Cardiology [phone]

## 2024-02-19 NOTE — DISCHARGE INSTRUCTIONS
I have placed a referral for close ENT follow-up.  If you have recurrent severe nosebleed please use the Afrin with direct pressure.  If you are unable to manage the nosebleed at home I have a low threshold for you to return to the ED. your blood counts were not low enough to require a transfusion today however if you develop any chest pain, shortness of breath or dizziness please return to the ED.

## 2024-02-19 NOTE — PROGRESS NOTES
2/19/24  Wife called to report Patient is at ED due to having nose bleed this am since 3:00 am, not sure if he'll be able to get lab drawn today

## 2024-02-20 ENCOUNTER — OFFICE VISIT (OUTPATIENT)
Dept: OTOLARYNGOLOGY | Facility: CLINIC | Age: 84
End: 2024-02-20
Payer: MEDICARE

## 2024-02-20 VITALS — SYSTOLIC BLOOD PRESSURE: 172 MMHG | HEART RATE: 56 BPM | DIASTOLIC BLOOD PRESSURE: 76 MMHG

## 2024-02-20 DIAGNOSIS — J34.89 NASAL SEPTAL PERFORATION: ICD-10-CM

## 2024-02-20 DIAGNOSIS — N18.32 STAGE 3B CHRONIC KIDNEY DISEASE: ICD-10-CM

## 2024-02-20 DIAGNOSIS — R04.0 EPISTAXIS: Primary | ICD-10-CM

## 2024-02-20 PROCEDURE — 1159F MED LIST DOCD IN RCRD: CPT | Mod: CPTII,S$GLB,, | Performed by: STUDENT IN AN ORGANIZED HEALTH CARE EDUCATION/TRAINING PROGRAM

## 2024-02-20 PROCEDURE — 1160F RVW MEDS BY RX/DR IN RCRD: CPT | Mod: CPTII,S$GLB,, | Performed by: STUDENT IN AN ORGANIZED HEALTH CARE EDUCATION/TRAINING PROGRAM

## 2024-02-20 PROCEDURE — 99999 PR PBB SHADOW E&M-EST. PATIENT-LVL IV: CPT | Mod: PBBFAC,HCNC,, | Performed by: STUDENT IN AN ORGANIZED HEALTH CARE EDUCATION/TRAINING PROGRAM

## 2024-02-20 PROCEDURE — 1126F AMNT PAIN NOTED NONE PRSNT: CPT | Mod: CPTII,S$GLB,, | Performed by: STUDENT IN AN ORGANIZED HEALTH CARE EDUCATION/TRAINING PROGRAM

## 2024-02-20 PROCEDURE — 3078F DIAST BP <80 MM HG: CPT | Mod: CPTII,S$GLB,, | Performed by: STUDENT IN AN ORGANIZED HEALTH CARE EDUCATION/TRAINING PROGRAM

## 2024-02-20 PROCEDURE — 99215 OFFICE O/P EST HI 40 MIN: CPT | Mod: 25,S$GLB,, | Performed by: STUDENT IN AN ORGANIZED HEALTH CARE EDUCATION/TRAINING PROGRAM

## 2024-02-20 PROCEDURE — 3077F SYST BP >= 140 MM HG: CPT | Mod: CPTII,S$GLB,, | Performed by: STUDENT IN AN ORGANIZED HEALTH CARE EDUCATION/TRAINING PROGRAM

## 2024-02-20 PROCEDURE — 31238 NSL/SINS NDSC SRG NSL HEMRRG: CPT | Mod: LT,S$GLB,, | Performed by: STUDENT IN AN ORGANIZED HEALTH CARE EDUCATION/TRAINING PROGRAM

## 2024-02-20 RX ORDER — CEPHALEXIN 500 MG/1
500 CAPSULE ORAL 4 TIMES DAILY
Qty: 28 CAPSULE | Refills: 0 | Status: ON HOLD | OUTPATIENT
Start: 2024-02-20 | End: 2024-02-28 | Stop reason: HOSPADM

## 2024-02-20 NOTE — H&P (VIEW-ONLY)
Subjective:      Dariel is a 83 y.o. male who comes for follow-up of  epistaxis .  His last visit with me was on 2/6/2024. He previously undergone bilateral SP ligation followed by bilateral embolization, cautery on the left septum for persistent bleeding on 11/03/2023 and again recently on 12/18/23. Was seen in the ED yesterday for bleeding, resolved on its own. H/H 9.0/28.9, INR 2.1.     His current sinus regime consists of:  Nasal saline.    The patient's medications, allergies, past medical, surgical, social and family histories were reviewed and updated as appropriate.      A detailed review of systems was obtained with pertinent positives as per the above HPI, and otherwise negative.        Objective:     BP (!) 172/76 (BP Location: Left arm, Patient Position: Sitting, BP Method: Small (Automatic))   Pulse (!) 56        Constitutional:   Vital signs are normal. He appears well-developed and well-nourished.     Head:  Normocephalic and atraumatic.     Ears:  Hearing normal to normal and whispered voice; external ear normal without scars, lesions, or masses; ear canal, tympanic membrane, and middle ear normal..   Right Ear: No swelling. Tympanic membrane is not perforated and not bulging. No middle ear effusion.   Left Ear: No swelling. Tympanic membrane is not perforated and not bulging.  No middle ear effusion.     Nose:  Nose normal including turbinates, nasal mucosa, sinuses and nasal septum. No epistaxis.     Mouth/Throat  Oropharynx clear and moist without lesions or asymmetry and normal uvula midline. Normal dentition. No tonsillar abscesses. Tonsillar exudate.      Neck:  Neck normal without thyromegaly masses, asymmetry, normal tracheal structure, crepitus, and tenderness, thyroid normal, trachea normal, phonation normal, full range of motion with neck supple and no adenopathy. No stridor present.        Head (right side): No submental adenopathy present.        Head (left side): No submental  "adenopathy present.     He has no cervical adenopathy.     Pulmonary/Chest:   No stridor.       Procedure    Nasal Endoscopy with Control of Epistaxis    After written consent was obtained the nose was anesthetized with topical pontocaine and phenylephrine pledgets.  The patents nose was sealed with clothes pin. The 0 degree endoscope was used to assess the left and right nasal cavities. After removal there was a large blood clot in the left nasal cavity. This was removed with suction. Diffuse oozing was noted along the left superior septum. The oozing was too diffuse for cautery, therefore the decision was made to place Merocel in the left nasal cavity to control his bleeding. The patient tolerated the procedure well and there were no complications.  Hemostasis was obtained.      Data Reviewed    WBC (K/uL)   Date Value   02/19/2024 5.88     Eosinophil % (%)   Date Value   02/19/2024 2.6     Eos # (K/uL)   Date Value   02/19/2024 0.2     Platelets (K/uL)   Date Value   02/19/2024 139 (L)     Glucose (mg/dL)   Date Value   02/19/2024 229 (H)     No results found for: "IGE"    No sinus imaging available.    Assessment:     1. Epistaxis    2. Stage 3b chronic kidney disease    3. Nasal septal perforation       Plan:     - Merocel placed in left nares.  - Keflex x 7 days  - Will plan for left AEA ligation, nasal endoscopy with control of epistaxis.    Jono Russell MD   "

## 2024-02-21 ENCOUNTER — TELEPHONE (OUTPATIENT)
Dept: SPEECH THERAPY | Facility: HOSPITAL | Age: 84
End: 2024-02-21
Payer: MEDICARE

## 2024-02-22 NOTE — PROGRESS NOTES
Pt is having surgery on 2/28 on pts nose with Dr. Russell and would like to know what they should do about pt warfarin. Please advise.

## 2024-02-23 NOTE — PROGRESS NOTES
Recommend pt holds 4 days prior to procedure. Post-op instructions to be provided by provider (Dr. Russell).

## 2024-02-27 ENCOUNTER — PATIENT MESSAGE (OUTPATIENT)
Dept: OTOLARYNGOLOGY | Facility: CLINIC | Age: 84
End: 2024-02-27
Payer: MEDICARE

## 2024-02-28 ENCOUNTER — HOSPITAL ENCOUNTER (OUTPATIENT)
Facility: HOSPITAL | Age: 84
Discharge: HOME | End: 2024-02-28
Attending: STUDENT IN AN ORGANIZED HEALTH CARE EDUCATION/TRAINING PROGRAM | Admitting: STUDENT IN AN ORGANIZED HEALTH CARE EDUCATION/TRAINING PROGRAM
Payer: MEDICARE

## 2024-02-28 ENCOUNTER — ANESTHESIA EVENT (OUTPATIENT)
Dept: SURGERY | Facility: HOSPITAL | Age: 84
End: 2024-02-28
Payer: MEDICARE

## 2024-02-28 ENCOUNTER — ANESTHESIA (OUTPATIENT)
Dept: SURGERY | Facility: HOSPITAL | Age: 84
End: 2024-02-28
Payer: MEDICARE

## 2024-02-28 VITALS
HEART RATE: 62 BPM | HEIGHT: 65 IN | BODY MASS INDEX: 28.66 KG/M2 | SYSTOLIC BLOOD PRESSURE: 169 MMHG | OXYGEN SATURATION: 99 % | WEIGHT: 172 LBS | TEMPERATURE: 98 F | DIASTOLIC BLOOD PRESSURE: 69 MMHG | RESPIRATION RATE: 17 BRPM

## 2024-02-28 DIAGNOSIS — R04.0 RECURRENT EPISTAXIS: ICD-10-CM

## 2024-02-28 LAB
BASOPHILS # BLD AUTO: 0.03 K/UL (ref 0–0.2)
BASOPHILS NFR BLD: 0.5 % (ref 0–1.9)
DIFFERENTIAL METHOD BLD: ABNORMAL
EOSINOPHIL # BLD AUTO: 0.1 K/UL (ref 0–0.5)
EOSINOPHIL NFR BLD: 1.4 % (ref 0–8)
ERYTHROCYTE [DISTWIDTH] IN BLOOD BY AUTOMATED COUNT: 14.6 % (ref 11.5–14.5)
HCT VFR BLD AUTO: 25 % (ref 40–54)
HGB BLD-MCNC: 7.9 G/DL (ref 14–18)
IMM GRANULOCYTES # BLD AUTO: 0.03 K/UL (ref 0–0.04)
IMM GRANULOCYTES NFR BLD AUTO: 0.5 % (ref 0–0.5)
LYMPHOCYTES # BLD AUTO: 0.5 K/UL (ref 1–4.8)
LYMPHOCYTES NFR BLD: 8.9 % (ref 18–48)
MCH RBC QN AUTO: 30.9 PG (ref 27–31)
MCHC RBC AUTO-ENTMCNC: 31.6 G/DL (ref 32–36)
MCV RBC AUTO: 98 FL (ref 82–98)
MONOCYTES # BLD AUTO: 0.2 K/UL (ref 0.3–1)
MONOCYTES NFR BLD: 3.6 % (ref 4–15)
NEUTROPHILS # BLD AUTO: 5 K/UL (ref 1.8–7.7)
NEUTROPHILS NFR BLD: 85.1 % (ref 38–73)
NRBC BLD-RTO: 0 /100 WBC
PLATELET # BLD AUTO: 170 K/UL (ref 150–450)
PMV BLD AUTO: 10.5 FL (ref 9.2–12.9)
RBC # BLD AUTO: 2.56 M/UL (ref 4.6–6.2)
WBC # BLD AUTO: 5.82 K/UL (ref 3.9–12.7)

## 2024-02-28 PROCEDURE — 94761 N-INVAS EAR/PLS OXIMETRY MLT: CPT | Mod: HCNC

## 2024-02-28 PROCEDURE — 37000009 HC ANESTHESIA EA ADD 15 MINS: Mod: HCNC | Performed by: STUDENT IN AN ORGANIZED HEALTH CARE EDUCATION/TRAINING PROGRAM

## 2024-02-28 PROCEDURE — 25000003 PHARM REV CODE 250: Mod: HCNC | Performed by: STUDENT IN AN ORGANIZED HEALTH CARE EDUCATION/TRAINING PROGRAM

## 2024-02-28 PROCEDURE — D9220A PRA ANESTHESIA: Mod: HCNC,ANES,, | Performed by: ANESTHESIOLOGY

## 2024-02-28 PROCEDURE — 36000710: Mod: HCNC | Performed by: STUDENT IN AN ORGANIZED HEALTH CARE EDUCATION/TRAINING PROGRAM

## 2024-02-28 PROCEDURE — 36000709 HC OR TIME LEV III EA ADD 15 MIN: Mod: HCNC | Performed by: STUDENT IN AN ORGANIZED HEALTH CARE EDUCATION/TRAINING PROGRAM

## 2024-02-28 PROCEDURE — 63600175 PHARM REV CODE 636 W HCPCS: Mod: HCNC | Performed by: NURSE ANESTHETIST, CERTIFIED REGISTERED

## 2024-02-28 PROCEDURE — 71000033 HC RECOVERY, INTIAL HOUR: Mod: HCNC | Performed by: STUDENT IN AN ORGANIZED HEALTH CARE EDUCATION/TRAINING PROGRAM

## 2024-02-28 PROCEDURE — 71000016 HC POSTOP RECOV ADDL HR: Mod: HCNC | Performed by: STUDENT IN AN ORGANIZED HEALTH CARE EDUCATION/TRAINING PROGRAM

## 2024-02-28 PROCEDURE — D9220A PRA ANESTHESIA: Mod: HCNC,CRNA,, | Performed by: NURSE ANESTHETIST, CERTIFIED REGISTERED

## 2024-02-28 PROCEDURE — 37000008 HC ANESTHESIA 1ST 15 MINUTES: Mod: HCNC | Performed by: STUDENT IN AN ORGANIZED HEALTH CARE EDUCATION/TRAINING PROGRAM

## 2024-02-28 PROCEDURE — 63600175 PHARM REV CODE 636 W HCPCS: Mod: HCNC | Performed by: STUDENT IN AN ORGANIZED HEALTH CARE EDUCATION/TRAINING PROGRAM

## 2024-02-28 PROCEDURE — 85025 COMPLETE CBC W/AUTO DIFF WBC: CPT | Mod: HCNC | Performed by: STUDENT IN AN ORGANIZED HEALTH CARE EDUCATION/TRAINING PROGRAM

## 2024-02-28 PROCEDURE — 63600175 PHARM REV CODE 636 W HCPCS: Mod: JZ,JG,HCNC | Performed by: ANESTHESIOLOGY

## 2024-02-28 PROCEDURE — 30915 LIGATION NASAL SINUS ARTERY: CPT | Mod: HCNC,,, | Performed by: STUDENT IN AN ORGANIZED HEALTH CARE EDUCATION/TRAINING PROGRAM

## 2024-02-28 PROCEDURE — 27201423 OPTIME MED/SURG SUP & DEVICES STERILE SUPPLY: Mod: HCNC | Performed by: STUDENT IN AN ORGANIZED HEALTH CARE EDUCATION/TRAINING PROGRAM

## 2024-02-28 PROCEDURE — 71000015 HC POSTOP RECOV 1ST HR: Mod: HCNC | Performed by: STUDENT IN AN ORGANIZED HEALTH CARE EDUCATION/TRAINING PROGRAM

## 2024-02-28 PROCEDURE — 25000003 PHARM REV CODE 250: Mod: HCNC | Performed by: NURSE ANESTHETIST, CERTIFIED REGISTERED

## 2024-02-28 PROCEDURE — 36000708 HC OR TIME LEV III 1ST 15 MIN: Mod: HCNC | Performed by: STUDENT IN AN ORGANIZED HEALTH CARE EDUCATION/TRAINING PROGRAM

## 2024-02-28 PROCEDURE — 63600175 PHARM REV CODE 636 W HCPCS: Mod: HCNC

## 2024-02-28 PROCEDURE — 36000711: Mod: HCNC | Performed by: STUDENT IN AN ORGANIZED HEALTH CARE EDUCATION/TRAINING PROGRAM

## 2024-02-28 PROCEDURE — 27000221 HC OXYGEN, UP TO 24 HOURS: Mod: HCNC

## 2024-02-28 RX ORDER — METOPROLOL SUCCINATE 25 MG/1
25 TABLET, EXTENDED RELEASE ORAL DAILY
Status: COMPLETED | OUTPATIENT
Start: 2024-02-28 | End: 2024-02-28

## 2024-02-28 RX ORDER — SODIUM CHLORIDE 0.9 % (FLUSH) 0.9 %
10 SYRINGE (ML) INJECTION
Status: DISCONTINUED | OUTPATIENT
Start: 2024-02-28 | End: 2024-02-28 | Stop reason: HOSPADM

## 2024-02-28 RX ORDER — LIDOCAINE HYDROCHLORIDE 10 MG/ML
1 INJECTION, SOLUTION EPIDURAL; INFILTRATION; INTRACAUDAL; PERINEURAL ONCE AS NEEDED
Status: COMPLETED | OUTPATIENT
Start: 2024-02-28 | End: 2024-02-28

## 2024-02-28 RX ORDER — VITAMIN A 3000 MCG
2 CAPSULE ORAL 3 TIMES DAILY
Qty: 44 ML | Refills: 3 | Status: SHIPPED | OUTPATIENT
Start: 2024-02-28

## 2024-02-28 RX ORDER — PHENYLEPHRINE HYDROCHLORIDE 10 MG/ML
INJECTION INTRAVENOUS
Status: DISCONTINUED | OUTPATIENT
Start: 2024-02-28 | End: 2024-02-28

## 2024-02-28 RX ORDER — FENTANYL CITRATE 50 UG/ML
25 INJECTION, SOLUTION INTRAMUSCULAR; INTRAVENOUS EVERY 5 MIN PRN
Status: DISCONTINUED | OUTPATIENT
Start: 2024-02-28 | End: 2024-02-28 | Stop reason: HOSPADM

## 2024-02-28 RX ORDER — FENTANYL CITRATE 50 UG/ML
INJECTION, SOLUTION INTRAMUSCULAR; INTRAVENOUS
Status: DISCONTINUED | OUTPATIENT
Start: 2024-02-28 | End: 2024-02-28

## 2024-02-28 RX ORDER — SUCCINYLCHOLINE CHLORIDE 20 MG/ML
INJECTION INTRAMUSCULAR; INTRAVENOUS
Status: DISCONTINUED | OUTPATIENT
Start: 2024-02-28 | End: 2024-02-28

## 2024-02-28 RX ORDER — LIDOCAINE HYDROCHLORIDE AND EPINEPHRINE 10; 10 MG/ML; UG/ML
INJECTION, SOLUTION INFILTRATION; PERINEURAL
Status: DISCONTINUED | OUTPATIENT
Start: 2024-02-28 | End: 2024-02-28 | Stop reason: HOSPADM

## 2024-02-28 RX ORDER — AMOXICILLIN AND CLAVULANATE POTASSIUM 875; 125 MG/1; MG/1
1 TABLET, FILM COATED ORAL 2 TIMES DAILY
Qty: 20 TABLET | Refills: 0 | Status: SHIPPED | OUTPATIENT
Start: 2024-02-28 | End: 2024-04-05

## 2024-02-28 RX ORDER — SODIUM CHLORIDE 9 MG/ML
INJECTION, SOLUTION INTRAVENOUS CONTINUOUS
Status: DISCONTINUED | OUTPATIENT
Start: 2024-02-28 | End: 2024-02-28 | Stop reason: HOSPADM

## 2024-02-28 RX ORDER — SODIUM CHLORIDE 9 MG/ML
INJECTION, SOLUTION INTRAVENOUS
Status: DISCONTINUED | OUTPATIENT
Start: 2024-02-28 | End: 2024-02-28 | Stop reason: HOSPADM

## 2024-02-28 RX ORDER — HYDROCODONE BITARTRATE AND ACETAMINOPHEN 5; 325 MG/1; MG/1
1 TABLET ORAL EVERY 4 HOURS PRN
Status: DISCONTINUED | OUTPATIENT
Start: 2024-02-28 | End: 2024-02-28 | Stop reason: HOSPADM

## 2024-02-28 RX ORDER — HYDRALAZINE HYDROCHLORIDE 20 MG/ML
10 INJECTION INTRAMUSCULAR; INTRAVENOUS EVERY 6 HOURS PRN
Status: DISCONTINUED | OUTPATIENT
Start: 2024-02-28 | End: 2024-02-28 | Stop reason: HOSPADM

## 2024-02-28 RX ORDER — EPHEDRINE SULFATE 50 MG/ML
INJECTION, SOLUTION INTRAVENOUS
Status: DISCONTINUED | OUTPATIENT
Start: 2024-02-28 | End: 2024-02-28

## 2024-02-28 RX ORDER — DEXAMETHASONE SODIUM PHOSPHATE 4 MG/ML
INJECTION, SOLUTION INTRA-ARTICULAR; INTRALESIONAL; INTRAMUSCULAR; INTRAVENOUS; SOFT TISSUE
Status: DISCONTINUED | OUTPATIENT
Start: 2024-02-28 | End: 2024-02-28

## 2024-02-28 RX ORDER — EPINEPHRINE 1 MG/ML
INJECTION, SOLUTION, CONCENTRATE INTRAVENOUS
Status: DISCONTINUED | OUTPATIENT
Start: 2024-02-28 | End: 2024-02-28 | Stop reason: HOSPADM

## 2024-02-28 RX ORDER — CEFAZOLIN SODIUM 1 G/3ML
INJECTION, POWDER, FOR SOLUTION INTRAMUSCULAR; INTRAVENOUS
Status: DISCONTINUED | OUTPATIENT
Start: 2024-02-28 | End: 2024-02-28

## 2024-02-28 RX ORDER — ROCURONIUM BROMIDE 10 MG/ML
INJECTION, SOLUTION INTRAVENOUS
Status: DISCONTINUED | OUTPATIENT
Start: 2024-02-28 | End: 2024-02-28

## 2024-02-28 RX ORDER — OXYMETAZOLINE HCL 0.05 %
SPRAY, NON-AEROSOL (ML) NASAL
Status: DISCONTINUED | OUTPATIENT
Start: 2024-02-28 | End: 2024-02-28 | Stop reason: HOSPADM

## 2024-02-28 RX ORDER — HYDROCODONE BITARTRATE AND ACETAMINOPHEN 5; 325 MG/1; MG/1
1 TABLET ORAL EVERY 6 HOURS PRN
Qty: 10 TABLET | Refills: 0 | Status: SHIPPED | OUTPATIENT
Start: 2024-02-28 | End: 2024-04-05

## 2024-02-28 RX ORDER — LIDOCAINE HYDROCHLORIDE 20 MG/ML
INJECTION INTRAVENOUS
Status: DISCONTINUED | OUTPATIENT
Start: 2024-02-28 | End: 2024-02-28

## 2024-02-28 RX ORDER — ONDANSETRON HYDROCHLORIDE 2 MG/ML
INJECTION, SOLUTION INTRAVENOUS
Status: DISCONTINUED | OUTPATIENT
Start: 2024-02-28 | End: 2024-02-28

## 2024-02-28 RX ORDER — LABETALOL HCL 20 MG/4 ML
10 SYRINGE (ML) INTRAVENOUS
Status: COMPLETED | OUTPATIENT
Start: 2024-02-28 | End: 2024-02-28

## 2024-02-28 RX ADMIN — CEFAZOLIN 2 G: 330 INJECTION, POWDER, FOR SOLUTION INTRAMUSCULAR; INTRAVENOUS at 12:02

## 2024-02-28 RX ADMIN — LABETALOL HYDROCHLORIDE 10 MG: 5 INJECTION, SOLUTION INTRAVENOUS at 03:02

## 2024-02-28 RX ADMIN — SODIUM CHLORIDE: 0.9 INJECTION, SOLUTION INTRAVENOUS at 12:02

## 2024-02-28 RX ADMIN — DEXAMETHASONE SODIUM PHOSPHATE 4 MG: 4 INJECTION, SOLUTION INTRAMUSCULAR; INTRAVENOUS at 01:02

## 2024-02-28 RX ADMIN — METOPROLOL SUCCINATE 25 MG: 25 TABLET, EXTENDED RELEASE ORAL at 04:02

## 2024-02-28 RX ADMIN — LIDOCAINE HYDROCHLORIDE 2 MG: 10 INJECTION, SOLUTION EPIDURAL; INFILTRATION; INTRACAUDAL; PERINEURAL at 09:02

## 2024-02-28 RX ADMIN — PHENYLEPHRINE HYDROCHLORIDE 100 MCG: 10 INJECTION INTRAVENOUS at 01:02

## 2024-02-28 RX ADMIN — HYDROCODONE BITARTRATE AND ACETAMINOPHEN 1 TABLET: 5; 325 TABLET ORAL at 05:02

## 2024-02-28 RX ADMIN — SODIUM CHLORIDE, SODIUM GLUCONATE, SODIUM ACETATE, POTASSIUM CHLORIDE, MAGNESIUM CHLORIDE, SODIUM PHOSPHATE, DIBASIC, AND POTASSIUM PHOSPHATE: .53; .5; .37; .037; .03; .012; .00082 INJECTION, SOLUTION INTRAVENOUS at 02:02

## 2024-02-28 RX ADMIN — ONDANSETRON 4 MG: 2 INJECTION INTRAMUSCULAR; INTRAVENOUS at 01:02

## 2024-02-28 RX ADMIN — FENTANYL CITRATE 50 MCG: 50 INJECTION, SOLUTION INTRAMUSCULAR; INTRAVENOUS at 12:02

## 2024-02-28 RX ADMIN — LABETALOL HYDROCHLORIDE 10 MG: 5 INJECTION, SOLUTION INTRAVENOUS at 04:02

## 2024-02-28 RX ADMIN — LIDOCAINE HYDROCHLORIDE 80 MG: 20 INJECTION INTRAVENOUS at 12:02

## 2024-02-28 RX ADMIN — SUCCINYLCHOLINE CHLORIDE 140 MG: 20 INJECTION, SOLUTION INTRAMUSCULAR; INTRAVENOUS at 12:02

## 2024-02-28 RX ADMIN — HYDRALAZINE HYDROCHLORIDE 10 MG: 20 INJECTION, SOLUTION INTRAMUSCULAR; INTRAVENOUS at 05:02

## 2024-02-28 RX ADMIN — ROCURONIUM BROMIDE 5 MG: 10 INJECTION, SOLUTION INTRAVENOUS at 12:02

## 2024-02-28 RX ADMIN — FENTANYL CITRATE 25 MCG: 50 INJECTION INTRAMUSCULAR; INTRAVENOUS at 05:02

## 2024-02-28 RX ADMIN — SODIUM CHLORIDE: 9 INJECTION, SOLUTION INTRAVENOUS at 09:02

## 2024-02-28 RX ADMIN — EPHEDRINE SULFATE 5 MG: 50 INJECTION INTRAVENOUS at 01:02

## 2024-02-28 NOTE — BRIEF OP NOTE
[facility] - Surgery ([facility])  Brief Operative Note    Surgery Date: 2/28/2024     Surgeon(s) and Role:     * Jono Russell MD - Primary    Assisting Surgeon: None    Pre-op Diagnosis:  Epistaxis [R04.0]    Post-op Diagnosis:  Post-Op Diagnosis Codes:     * Epistaxis [R04.0]    Procedure(s) (LRB):  LIGATION, ARTERY, ETHMOIDAL (Left)  FESS (FUNCTIONAL  ENDOSCOPIC SINUS  SURGERY) MEDTRONIC (Left)    Anesthesia: General    Operative Findings: see full op note    Estimated Blood Loss: 20 ml         Specimens:   Specimen (24h ago, onward)      None              Discharge Note    OUTCOME: Patient tolerated treatment/procedure well without complication and is now ready for discharge.    DISPOSITION: Home or Self Care    FINAL DIAGNOSIS:  Recurrent epistaxis    FOLLOWUP: In clinic    DISCHARGE INSTRUCTIONS:    Discharge Procedure Orders   Diet general

## 2024-02-28 NOTE — DISCHARGE INSTRUCTIONS
INSTRUCTIONS TO FOLLOW AFTER SINUS AND NASAL SURGERY  DR. BARTON - [facility]    Office hours:  Weekdays 8:00 am to 5:00 pm.  Please call [phone] and ask to speak with his nurse, Sowmya.    After-hours & weekends:  Please call [phone] and ask to speak with the ENT resident doctor.    Your first office visit with Dr. Russell after surgery should have been already scheduled.  If you dont know when it is, call Dr. Russell's nurse Sowmya at [phone].    Please call IMMMEDIATELY if you have:  Temperature of 101° F or greater  Any unusual, painful swelling  Any active bleeding that saturates more than a 4x4 gauze  Any thick drainage green or yellow drainage  Changes in vision or swelling around the eye  Pain not relieved by your prescribed pain medication    ACTIVITY:    Sleep on your back with the head of the bead elevated, up on 2-3 pillows, or in a recliner for the first 3 to 5 days. This will help with swelling.     After surgery you may have a lot of nasal drainage. This is normal. You may breathe through your nose if youre able but avoid inhaling forcibly. Let all drainage fall on your mustache dressing and change it as needed.    You may wake up after surgery with thick white stockings on. Wear them until you are walking around more. It is important to walk around often while at home to keep your blood circulating and prevent blood clots.    If you use CPAP or BiPAP to sleep at night, you should wait at least 48 hours before resuming use.  Dr. Russell will advise you when it is safe to do this.    You may shower 24 hours after surgery.    RESTRICTED ACTIVITIES AFTER SURGERY:    Do NOT blow your nose until you see Dr. Russell in clinic. If you have to sneeze or cough, do so with your mouth open.     AVOID all heavy lifting, straining or bending for 2 weeks.     AVOID any sexual activity for 2 weeks after surgery.    AVOID semi-contact sports or vigorous exercising for 3-4 weeks. Dr. Russell will let you know  when you are cleared to resume exercise.    AVOID flying or swimming for 2 weeks.      Do NOT operate a motor vehicle or any type of heavy machinery within 24 hours of taking pain medication.    DO NOT smoke or be around smokers.    AVOID irritating substances that might make you sneeze, such as dust, chalk, harsh chemicals, and allergic triggers.  This might also include spicy foods.    DRESSINGS:    Change the gauze mustache dressing under your nose as needed. (If unsure what this dressing is or how to do this, ask your doctor or nurse before you leave the hospital.) You may have pinkish-red drainage for 2-3 days.    Usually there is no gauze packing placed inside the nose.  If packing is necessary, you will be informed by your surgeon.  Do not touch or pull at the packing. The packing will be removed by your doctor at your first visit after surgery.     You may also have a dissolvable stent or dissolvable sponge placed into the sinuses during surgery.  These usually do not need to be removed unless you are told otherwise by Dr. Russell.  You may notice small fragments of these items come out of your nose in the weeks following surgery.    MEDICATIONS:    After surgery, you will be sent home with prescriptions for pain medication and an anti-nausea medication.  Antibiotics are usually not necessary.    Most people need pain medication for the first few days after surgery, although a narcotic is rarely necessary.  The best pain control comes from a combination of acetaminophen (Tylenol) and ibuprofen (Motrin).  You will be given prescriptions for these at the recommended dose.  These can be alternated so that you are taking something every 2 or 3 hours.    Some people have problems with bowel movements after surgery. If you have NOT had a bowel movement 3-5 days after surgery, go to your local pharmacy and purchase an over the counter stool softener such as COLACE. You can also ask the pharmacist for his or her  recommendation. If you still do not have a bowel movement after starting the softener, please call the office.    You will need these over-the-counter medications after surgery:    SALINE SINUS RINSE ([employer] brand):  You will use this to rinse out your nose and sinuses after surgery.  Begin doing this the day after surgery, unless instructed otherwise by Dr. Russell.  You should do this 2 times a day, following the instructions on the box.  AFRIN (regular strength): Only use if you have nasal congestion or bleeding. Use 2 times per day for 3 days, stop for 1 day, continue 2 times per day for 3 days, then stop completely.  NOTE:  You may not need to do this at all.    DIET:    Avoid hot and spicy foods for 1 week after surgery.    Begin with bland foods the evening after surgery and advance to your regular diet as tolerated.  It is not necessary to take only soft food unless you are recovering from tonsil surgery.    Drink plenty of fluids (water is best).     Avoid alcoholic and caffeinated beverages for 1 week after surgery because they can cause you to become dehydrated.

## 2024-02-28 NOTE — ANESTHESIA PROCEDURE NOTES
Intubation    Date/Time: 2/28/2024 12:42 PM    Performed by: Gareth Oliveira CRNA  Authorized by: Lidna Tam MD    Intubation:     Induction:  Intravenous    Intubated:  Postinduction    Mask Ventilation:  Not attempted    Attempts:  1    Attempted By:  CRNA    Method of Intubation:  Video laryngoscopy    Blade:  Osorio 3    Laryngeal View Grade: Grade I - full view of cords      Difficult Airway Encountered?: No      Complications:  None    Airway Device:  Oral endotracheal tube    Airway Device Size:  7.5    Style/Cuff Inflation:  Cuffed (inflated to minimal occlusive pressure)    Tube secured:  22    Secured at:  The lips    Placement Verified By:  Capnometry and Fiber optic visualization    Complicating Factors:  None    Findings Post-Intubation:  BS equal bilateral and atraumatic/condition of teeth unchanged

## 2024-02-28 NOTE — OP NOTE
2/28/2024     PREOPERATIVE DIAGNOSIS(ES):    1.   Recurrent epistaxis.      POSTOPERATIVE DIAGNOSIS(ES):    1.   Same     PROCEDURE:    1.   Left endoscopic assisted ligation of the anterior ethmoid artery.   2.   Repeat control of epistaxis.   3.   Use of [employer] neuronavigation.     ANESTHESIA:  General endotracheal.     SURGEON(S):  Jono Russell MD     ASSISTANT:  Keira Mcmillan MD     ESTIMATED BLOOD LOSS:  20cc.     COMPLICATIONS:  None     FINDINGS:    1.   Diffuse oozing of his left septal mucosa and a pulsatile bleeding along his left inferior turbinate.  2.   Successful ligation of the left anterior ethmoid artery.     INDICATIONS FOR PROCEDURE:  This is a 83-year-old male with a history of recurrent epistaxis.  He as previously undergone bilateral sphenopalatine artery ligation, bilateral facial and internal maxillary artery embolization, and nasal endoscopy with control recurrent epistaxis.  He is chronically on Coumadin for his AFib in his unable to be weaned off.  Previously he was found to be supratherapeutic with his INR, however he returned recently to my clinic with recurrent episodes of epistaxis exclusively on the left side he was packed clinic with a Merocel sponge.  He was offered anterior ethmoid artery ligation on the left given his epistaxis is exclusively on the left side.    The risks, benefits, and alternatives of surgery were discussed at length with the patient and include, but are not limited to bleeding, infection, need for revision surgery, injury to the eye or brain, double vision, loss of vision, cerebrospinal fluid leakage, meningitis, chronic sinusitis, nasal obstruction, nasal septal perforation, numbness to teeth or cheek, or need for time and expense off work.  The patient demonstrates understanding and wishes to proceed.  All of his questions were answered to his satisfaction.     DESCRIPTION OF PROCEDURE:  The patient was taken back to the operating room and after  successful induction of general anesthesia, was prepped and draped in a sterile fashion.  The [employer] neuronavigation system was employed.  The images were uploaded, the instruments were calibrated, and accuracy was confirmed.  Afrin pledgets were used intranasally for decongestion.    A pre-operative timeout was taken to confirm the correct patient and procedure being performed.      Attention was 1st turned to the ligation of the anterior ethmoid artery.  The Couch incision was drawn out.  2 cc of 1% lidocaine with [phone] parts epinephrine was infiltrated into subcutaneous tissue.  Fifteen blade was then used to incise the dermis down to the soft tissue.  The Bovie was then used to incise any remaining soft tissue until the periorbita was reached.  A endoscope was then placed into the cavity and a suction Goldvein elevator was used to elevate the dominic orbital between the lamina and the periorbita.  The nasofrontal suture line was followed posteriorly until the anterior ethmoid artery was identified.  Two clips were placed along the neurovascular bundle of the anterior ethmoid artery.    Attention was then turned to the nose.  The Merocel was removed from the nasal cavity.  Diffuse oozing was noted along the nasal septum and along the inferior turbinate.  A suction Bovie on 15 w was used to cauterize the multiple bleeding sites along the septum.  The inferior turbinate bleeding was noted to be pulsatile in nature.  The suction Bovie was used to achieve hemostasis.  Once there was adequate hemostasis in the nose, Veena hemostasis agent was placed into the nasal cavity.  Posisept was additionally placed in the nasal cavity on the left.    The Couch incision was then closed using 3-0 Vicryl and Dermabond for the skin.     At the conclusion of the case, the nose was copiously irrigated with saline solution to remove all debris and both inferior turbinates were outfractured.     The patient was then awakened,  extubated, and taken to recovery room in stable condition.      I was scrubbed and personally present during all portions of this procedure, and I was immediately available throughout the entire procedure.    Jono Russell MD

## 2024-02-28 NOTE — ANESTHESIA PREPROCEDURE EVALUATION
[facility]  Anesthesia Pre-Operative Evaluation         Patient Name: Dariel Urbina  YOB: 1940  MRN: [id number]    SUBJECTIVE:          02/28/2024    Dariel Urbina is a 83 y.o. male w/ a significant PMHx of pAF, mechanical aortic valve (on warfarin), and recurrent epistaxis s/p bilateral endoscopic sphenopalatine artery ligation on 6/14/23, left sphenopalatine artery embolization on 10/5/23, and nasal endoscopy and cauterization on 11/3/23.         Prev airway: Intubation:     Induction:  Rapid sequence induction    Intubated:  Postinduction    Mask Ventilation:  Not attempted    Attempts:  1    Attempted By:  CRNA    Method of Intubation:  Video laryngoscopy    Blade:  Osorio 3    Laryngeal View Grade: Grade I - full view of cords      Difficult Airway Encountered?: No      Complications:  None    Airway Device:  Oral endotracheal tube    Airway Device Size:  7.5    Style/Cuff Inflation:  Cuffed    Inflation Amount (mL):  7    Tube secured:  22    Secured at:  The lips    Placement Verified By:  Capnometry    Complicating Factors:  None    Findings Post-Intubation:  BS equal bilateral and atraumatic/condition of teeth unchanged    Drips: None documented.      Patient Active Problem List   Diagnosis    Hyperlipidemia associated with type 2 diabetes mellitus    History of colon resection    Renovascular hypertension    History of gout    Atherosclerosis of native artery of extremity with intermittent claudication    Type 2 diabetes mellitus with diabetic peripheral angiopathy without gangrene    Bilateral carotid artery disease    Pulmonary heart disease    Metabolic acidosis with normal anion gap and bicarbonate losses    Epistaxis    Gastrointestinal hemorrhage associated with intestinal diverticulosis    Hx of colonic polyp    Hypertension associated with diabetes    Bilateral carpal tunnel syndrome    Numbness and tingling in both hands    Severe carpal tunnel syndrome  of right wrist    Atherosclerosis of native coronary artery of native heart without angina pectoris    Anemia    CKD (chronic kidney disease), stage IV    Syncope    Acute blood loss anemia    Recurrent epistaxis    Supratherapeutic INR    Coronary artery disease involving native coronary artery of native heart without angina pectoris    Chronic combined systolic and diastolic heart failure    Paroxysmal atrial fibrillation    Mitral insufficiency    Aortic calcification    Dysphagia    Acute on chronic diastolic heart failure    Secondary hyperparathyroidism of renal origin    Dysphonia       Review of patient's allergies indicates:   Allergen Reactions    Lisinopril     Losartan      Intolerance- elevates potassium level       Current Inpatient Medications:    Past Surgical History:   Procedure Laterality Date    BACK SURGERY      CARDIAC CATHETERIZATION      CARDIAC VALVE REPLACEMENT      CARDIAC VALVE SURGERY      CARPAL TUNNEL RELEASE Right 05/19/2020    Procedure: RELEASE, CARPAL TUNNEL;  Surgeon: Rupesh Norris Jr., MD;  Location: [facility];  Service: Plastics;  Laterality: Right;    CATARACT EXTRACTION Left 11/13/2022        COLON SURGERY      COLONOSCOPY N/A 03/31/2017    Procedure: COLONOSCOPY;  Surgeon: Bruno Raymond MD;  Location: [facility] (4TH FLR);  Service: Endoscopy;  Laterality: N/A;  Patient's wife requesting date.    COLONOSCOPY N/A 04/03/2018    Procedure: COLONOSCOPY;  Surgeon: Bonifacio Pelletier MD;  Location: [facility] ENDO (2ND FLR);  Service: Endoscopy;  Laterality: N/A;    COLONOSCOPY N/A 08/13/2018    Procedure: COLONOSCOPY;  Surgeon: Kam Barba MD;  Location: [facility] ENDO (2ND FLR);  Service: Endoscopy;  Laterality: N/A;  2nd floor: PA pressure 49; hx of moderate-severe valve disease     per Coumadin clinic-Patient can hold 5 days with lovenox bridge       ok to schedule per Katarina    CORONARY ANGIOGRAPHY N/A 10/04/2021    Procedure: Left heart cath +/- peripheral angiogram;   Surgeon: Jose Ruiz MD;  Location: [facility] CATH LAB;  Service: Cardiology;  Laterality: N/A;    CORONARY ANGIOGRAPHY N/A 3/2/2023    Procedure: Angiogram, Coronary;  Surgeon: Devon Garnica MD;  Location: [facility] CATH LAB;  Service: Cardiology;  Laterality: N/A;    CORONARY ANGIOPLASTY      CORONARY ARTERY BYPASS GRAFT      CORONARY BYPASS GRAFT ANGIOGRAPHY  10/04/2021    Procedure: Bypass graft study;  Surgeon: Jose Ruiz MD;  Location: [facility] CATH LAB;  Service: Cardiology;;    CORONARY BYPASS GRAFT ANGIOGRAPHY  3/2/2023    Procedure: Bypass graft study;  Surgeon: Devon Garnica MD;  Location: [facility] CATH LAB;  Service: Cardiology;;    ECHOCARDIOGRAM,TRANSESOPHAGEAL N/A 4/14/2023    Procedure: Transesophageal echo (KIRSTEN) intra-procedure log documentation;  Surgeon: [facility] Diagnostic Provider;  Location: [facility] EP LAB;  Service: Cardiology;  Laterality: N/A;    ENDOSCOPIC NASAL CAUTERIZATION Bilateral 11/3/2023    Procedure: CAUTERIZATION, NOSE, ENDOSCOPIC;  Surgeon: Jono Russell MD;  Location: [address];  Service: ENT;  Laterality: Bilateral;    ENDOSCOPIC NASAL CAUTERIZATION N/A 12/18/2023    Procedure: CAUTERIZATION, NOSE, ENDOSCOPIC;  Surgeon: Jono Russell MD;  Location: [facility] OR 2ND FLR;  Service: ENT;  Laterality: N/A;    EYE SURGERY      FUNCTIONAL ENDOSCOPIC SINUS SURGERY (FESS) Bilateral 6/14/2023    Procedure: FESS (FUNCTIONAL ENDOSCOPIC SINUS SURGERY);  Surgeon: Jono Russell MD;  [facility];  Service: ENT;  Laterality: Bilateral;    HERNIA REPAIR      INTRAOCULAR PROSTHESES INSERTION Left 11/13/2022    Procedure: INSERTION, IOL PROSTHESIS;  Surgeon: Alia Mckeon MD;  [facility];  Service: Ophthalmology;  Laterality: Left;    INTRAOCULAR PROSTHESES INSERTION Right 12/04/2022    Procedure: INSERTION, IOL PROSTHESIS;  Surgeon: Alia Mckeon MD;  Location: [facility] OR 1ST FLR;  Service: Ophthalmology;  Laterality: Right;    LIGATION, ARTERY,  SPHENOPALATINE, ENDOSCOPIC Bilateral 2023    Procedure: LIGATION, ARTERY, SPHENOPALATINE, ENDOSCOPIC;  Surgeon: Jono Russell MD;  Location: [facility] OR 2ND FLR;  Service: ENT;  Laterality: Bilateral;    PHACOEMULSIFICATION OF CATARACT Left 2022    Procedure: PHACOEMULSIFICATION, CATARACT;  Surgeon: Alia Mckeon MD;  Location: [facility] OR 1ST FLR;  Service: Ophthalmology;  Laterality: Left;    PHACOEMULSIFICATION OF CATARACT Right 2022    Procedure: PHACOEMULSIFICATION, CATARACT;  Surgeon: Alia Mckeon MD;  Location: [facility] OR 1ST FLR;  Service: Ophthalmology;  Laterality: Right;    SPINE SURGERY      TRANSESOPHAGEAL ECHOCARDIOGRAPHY N/A 3/22/2023    Procedure: ECHOCARDIOGRAM, TRANSESOPHAGEAL;  Surgeon: [facility] Diagnostic Provider;  Location: [facility] EP LAB;  Service: Anesthesiology;  Laterality: N/A;    TRANSESOPHAGEAL ECHOCARDIOGRAPHY N/A 2023    Procedure: ECHOCARDIOGRAM, TRANSESOPHAGEAL;  Surgeon: Thuan Diagnostic Provider;  Location: [facility] EP LAB;  Service: Anesthesiology;  Laterality: N/A;    VASECTOMY         Social History     Socioeconomic History    Marital status:      Spouse name: Ameena    Number of children: 3   Occupational History    Occupation: retired   Tobacco Use    Smoking status: Former     Current packs/day: 0.00     Average packs/day: 1 pack/day for 20.0 years (20.0 ttl pk-yrs)     Types: Cigarettes     Start date: 1960     Quit date: 1980     Years since quittin.4     Passive exposure: Never    Smokeless tobacco: Never   Substance and Sexual Activity    Alcohol use: No    Drug use: No    Sexual activity: Not Currently     Partners: Female     Social Determinants of Health     Financial Resource Strain: Low Risk  (2023)    Overall Financial Resource Strain (CARDIA)     Difficulty of Paying Living Expenses: Not hard at all   Food Insecurity: No Food Insecurity (2023)    Hunger Vital Sign     Worried About Running Out of Food in the Last Year:  Never true     Ran Out of Food in the Last Year: Never true   Transportation Needs: No Transportation Needs (12/16/2023)    PRAPARE - Transportation     Lack of Transportation (Medical): No     Lack of Transportation (Non-Medical): No   Physical Activity: Insufficiently Active (12/16/2023)    Exercise Vital Sign     Days of Exercise per Week: 1 day     Minutes of Exercise per Session: 10 min   Stress: No Stress Concern Present (12/16/2023)    [employer] - Occupational Stress Questionnaire     Feeling of Stress : Only a little   Social Connections: Moderately Integrated (12/16/2023)    Social Connection and Isolation Panel [NHANES]     Frequency of Communication with Friends and Family: More than three times a week     Frequency of Social Gatherings with Friends and Family: More than three times a week     Attends Sabianist Services: More than 4 times per year     Active Member of Clubs or Organizations: No     Attends Club or Organization Meetings: Never     Marital Status:    Recent Concern: Social Connections - Moderately Isolated (10/9/2023)    Social Connection and Isolation Panel [NHANES]     Frequency of Communication with Friends and Family: More than three times a week     Frequency of Social Gatherings with Friends and Family: More than three times a week     Attends Sabianist Services: Never     Active Member of Clubs or Organizations: No     Attends Club or Organization Meetings: Never     Marital Status:    Housing Stability: Low Risk  (12/16/2023)    Housing Stability Vital Sign     Unable to Pay for Housing in the Last Year: No     Number of Places Lived in the Last Year: 1     Unstable Housing in the Last Year: No       OBJECTIVE:     Vital Signs Range (Last 24H):  Temp:  [36.7 °C (98.1 °F)]   Pulse:  [60]   Resp:  [18]   BP: (183)/(73)   SpO2:  [99 %]       Significant Labs:  Lab Results   Component Value Date    WBC 5.88 02/19/2024    HGB 9.0 (L)  02/19/2024    HCT 28.9 (L) 02/19/2024     (L) 02/19/2024    CHOL 115 (L) 02/28/2023    TRIG 155 (H) 02/28/2023    HDL 37 (L) 02/28/2023    ALT 11 02/19/2024    AST 14 02/19/2024     02/19/2024    K 4.7 02/19/2024     02/19/2024    CREATININE 1.9 (H) 02/19/2024    BUN 51 (H) 02/19/2024    CO2 23 02/19/2024    TSH 3.705 09/29/2022    PSA 0.35 07/27/2012    INR 2.1 (H) 02/19/2024    HGBA1C 6.1 (H) 11/07/2023       Diagnostic Studies: No relevant studies.    EKG:   Results for orders placed or performed during the hospital encounter of 12/15/23   EKG 12-lead    Collection Time: 12/15/23  9:49 AM    Narrative    Test Reason : R53.83,    Vent. Rate : 059 BPM     Atrial Rate : 340 BPM     P-R Int : 000 ms          QRS Dur : 166 ms      QT Int : 500 ms       P-R-T Axes : 000 065 001 degrees     QTc Int : 495 ms    Atrial fibrillation with slow ventricular response  Left bundle branch block  Abnormal ECG  When compared with ECG of 25-MAY-2023 07:24,  T wave inversion less evident in Lateral leads  Confirmed by Jeremias APARICIO MD ([id number]) on 12/15/2023 11:02:47 AM    Referred By: [email or web address]   SELF           Confirmed By:Jeremias APARICIO MD       2D ECHO:  TTE:  Results for orders placed or performed during the hospital encounter of 12/29/22   Echo   Result Value Ref Range    BSA 1.9 m2    TDI SEPTAL 0.06 m/s    LV LATERAL E/E' RATIO 17.88 m/s    LV SEPTAL E/E' RATIO 23.83 m/s    LA WIDTH 5.80 cm    TDI LATERAL 0.08 m/s    LVIDd 5.21 3.5 - 6.0 cm    IVS 1.15 (A) 0.6 - 1.1 cm    Posterior Wall 1.12 (A) 0.6 - 1.1 cm    LVIDs 4.09 (A) 2.1 - 4.0 cm    FS 21 28 - 44 %    LA volume 194.02 cm3    Sinus 3.15 cm    STJ 2.49 cm    Ascending aorta 2.61 cm    LV mass 231.14 g    LA size 5.44 cm    RVDD 3.95 cm    TAPSE 1.73 cm    Left Ventricle Relative Wall Thickness 0.43 cm    AV mean gradient 17 mmHg    AV valve area 1.25 cm2    AV Velocity Ratio 0.36     AV index (prosthetic) 0.38     MV mean gradient 4 mmHg    MV valve area p  1/2 method 4.64 cm2    MV valve area by continuity eq 2.41 cm2    E/A ratio 4.33     Mean e' 0.07 m/s    E wave deceleration time 163.38 msec    LVOT diameter 2.06 cm    LVOT area 3.3 cm2    LVOT peak ashkan 1.09 m/s    LVOT peak VTI 21.00 cm    Ao peak ashkan 3.06 m/s    Ao VTI 55.81 cm    Mr max ashkan 0.06 m/s    LVOT stroke volume 69.96 cm3    AV peak gradient 37 mmHg    MV peak gradient 11 mmHg    E/E' ratio 20.43 m/s    MV Peak E Ash[place] 1.43 m/s    TR Max Ashkan 3.85 m/s    MV VTI 29.04 cm    MV stenosis pressure 1/2 time 47.38 ms    MV Peak A Ashkan 0.33 m/s    LV Systolic Volume 73.61 mL    LV Systolic Volume Index 39.6 mL/m2    LV Diastolic Volume 130.05 mL    LV Diastolic Volume Index 69.92 mL/m2    LA Volume Index 104.3 mL/m2    LV Mass Index 124 g/m2    RA Major Axis 5.43 cm    Left Atrium Minor Axis 7.30 cm    Left Atrium Major Axis 7.17 cm    Triscuspid Valve Regurgitation Peak Gradient 59 mmHg    LA Volume Index (Mod) 93.6 mL/m2    LA volume (mod) 174.14 cm3    RA Width 3.59 cm    Right Atrial Pressure (from IVC) 3 mmHg    EF 48 %    TV resting pulmonary artery pressure 62 mmHg    Narrative    · The left ventricle is normal in size with mild concentric hypertrophy   and mildly decreased systolic function.  · The estimated ejection fraction is 48%.  · There are segmental left ventricular wall motion abnormalities.  · There is abnormal septal wall motion.  · Grade III left ventricular diastolic dysfunction.  · Severe left atrial enlargement.  · Normal right ventricular size with normal right ventricular systolic   function.  · Mild right atrial enlargement.  · There is a mechanical aortic valve present.  · The aortic valve mean gradient is 17 mmHg with a dimensionless index of   0.38.  · Moderate to moderate -severe ( 2-3+) MR  · Moderate tricuspid regurgitation.  · Normal central venous pressure (3 mmHg).  · The estimated PA systolic pressure is 62 mmHg.  · There is at least moderate pulmonary hypertension.           KIRSTEN:  Results for orders placed or performed during the hospital encounter of 04/11/23   Transesophageal echo (KIRSTEN)   Result Value Ref Range    BSA 1.84 m2    EF 60 %    Narrative    · The left ventricle is normal in size with normal systolic function.The   estimated ejection fraction is 60%.  · Normal right ventricular size with normal right ventricular systolic   function.  · Moderate mitral regurgitation.  · There is a mechanical aortic valve present. There is no aortic   insufficiency present.  · Plaque present in the transverse aorta.          ASSESSMENT/PLAN:         Pre-op Assessment    I have reviewed the Patient Summary Reports.     I have reviewed the Nursing Notes. I have reviewed the NPO Status.   I have reviewed the Medications.     Review of Systems  Anesthesia Hx:  No problems with previous Anesthesia   History of prior surgery of interest to airway management or planning:          Denies Family Hx of Anesthesia complications.    Denies Personal Hx of Anesthesia complications.                    Hematology/Oncology:    Oncology Normal    -- Anemia:                                  EENT/Dental:  EENT/Dental Normal           Cardiovascular:     Hypertension Valvular problems/Murmurs (s/p replacement on warfarin) Past MI CAD   CABG/stent               Coronary Artery Disease:          Hx of Myocardial Infarction     Congestive Heart Failure (CHF)                Hypertension     Atrial Fibrillation     Pulmonary:  Pulmonary Normal                       Renal/:  Chronic Renal Disease, CKD        Kidney Function/Disease             Hepatic/GI:     GERD      Gerd          Neurological:    Neuromuscular Disease,                                 Neuromuscular Disease   Endocrine:  Diabetes    Diabetes                      Psych:  Psychiatric Normal                    Physical Exam  General: Well nourished, Cooperative, Alert and Oriented  Continuously having to cough up clot from back of  throat  Airway:  Mallampati: II / II/ I  Mouth Opening: Normal  TM Distance: Normal  Tongue: Normal  Neck ROM: Normal ROM    Dental:  Periodontal disease  No teeth on top. Has 4 teeth with removable plate in center. Few remaining have disease/chipped      Anesthesia Plan  Type of Anesthesia, risks & benefits discussed:    Anesthesia Type: Gen ETT  Intra-op Monitoring Plan: Standard ASA Monitors  Post Op Pain Control Plan: multimodal analgesia and IV/PO Opioids PRN  Induction:  IV  Airway Plan: Direct, Post-Induction  Informed Consent: Informed consent signed with the Patient and all parties understand the risks and agree with anesthesia plan.  All questions answered.   ASA Score: 3  Day of Surgery Review of History & Physical: H&P Update referred to the surgeon/provider.    Ready For Surgery From Anesthesia Perspective.     .

## 2024-02-29 NOTE — ANESTHESIA RELEASE NOTE
"Anesthesia Release from PACU Note    Patient: Dariel Urbina    Procedure(s) Performed: Procedure(s) (LRB):  LIGATION, ARTERY, ETHMOIDAL (Left)  FESS (FUNCTIONAL  ENDOSCOPIC SINUS  SURGERY) MEDTRONIC (Left)    Anesthesia type: general    Post pain: Adequate analgesia    Post assessment: no apparent anesthetic complications    Last Vitals: Visit Vitals  BP (!) 165/71 (BP Location: Right arm, Patient Position: Sitting)   Pulse 61   Temp 36.6 °C (97.9 °F) (Temporal)   Resp 15   Ht 5' 5" (1.651 m)   Wt 78 kg (172 lb)   SpO2 97%   BMI 28.62 kg/m²       Post vital signs: stable, HTN but below baseline     Level of consciousness: awake, alert , and oriented    Nausea/Vomiting: no nausea/no vomiting    Complications: none    Airway Patency: patent    Respiratory: unassisted    Cardiovascular: stable and blood pressure at baseline    Hydration: euvolemic  "

## 2024-02-29 NOTE — PROGRESS NOTES
Report received from Caryn GARCIA. Pt noted lying in bed with eyes closed.AAOX's 3 Resp even and unlabored. No s/s of acute distress. 2/10 c/o pain to left eye. Bruising and derma bond noted to left eye area. Minimal serosanguinous drainage noted from left eye. Patient able to communicate needs. Spouse at bedside.

## 2024-02-29 NOTE — PROGRESS NOTES
Patient discharged per Anesthesia Release. No s/s of acute distress. Resp even and unlabored. Denies c/o pain. Reviewed discharge instructions and follow up care with patient and spouse. All parties verbalized understanding. Transported home via personal transportation.

## 2024-02-29 NOTE — PROGRESS NOTES
Pt BP improving with hydralazine. BP readings in the 160s for the past hour. Anesthesia aware and pt is released for discharge. Surgical team referred to anesthesia to manage BP and approval for discharge. Britton HEBERT at bedside and okay for pt to get discharged.

## 2024-03-04 ENCOUNTER — ANTI-COAG VISIT (OUTPATIENT)
Dept: CARDIOLOGY | Facility: CLINIC | Age: 84
End: 2024-03-04
Payer: MEDICARE

## 2024-03-04 ENCOUNTER — LAB VISIT (OUTPATIENT)
Dept: LAB | Facility: HOSPITAL | Age: 84
End: 2024-03-04
Attending: INTERNAL MEDICINE
Payer: MEDICARE

## 2024-03-04 DIAGNOSIS — I48.0 PAROXYSMAL ATRIAL FIBRILLATION: ICD-10-CM

## 2024-03-04 DIAGNOSIS — Z95.2 H/O MECHANICAL AORTIC VALVE REPLACEMENT: ICD-10-CM

## 2024-03-04 DIAGNOSIS — Z79.01 ANTICOAGULANT LONG-TERM USE: ICD-10-CM

## 2024-03-04 LAB
INR PPP: 1.5 (ref 0.8–1.2)
PROTHROMBIN TIME: 15.7 SEC (ref 9–12.5)

## 2024-03-04 PROCEDURE — 93793 ANTICOAG MGMT PT WARFARIN: CPT | Mod: S$GLB,,,

## 2024-03-04 PROCEDURE — 85610 PROTHROMBIN TIME: CPT | Mod: HCNC | Performed by: INTERNAL MEDICINE

## 2024-03-04 PROCEDURE — 36415 COLL VENOUS BLD VENIPUNCTURE: CPT | Mod: HCNC | Performed by: INTERNAL MEDICINE

## 2024-03-04 NOTE — PROGRESS NOTES
[facility]    2024 2:13 PM    Assessment/Plan:    Patient presents today with subtherapeutic  INR.    Assessment of patient findings and chart review: Subtherapeutic likely due to the holding for procedure.     Recommendation for patient's warfarin regimen: Boost & resume maintenance dose.     Recommend repeat INR in 1 week  _________________________________________________________________    Dariel Urbina (83 y.o.) is followed by the [facility].    Anticoagulation Summary  As of 3/4/2024      INR goal:  2.0-3.0   TTR:  68.4 % (11.6 y)   INR used for dosin.5 (3/4/2024)   Warfarin maintenance plan:  7.5 mg (5 mg x 1.5) every Thu; 5 mg (5 mg x 1) all other days   Weekly warfarin total:  37.5 mg   Plan last modified:  Laurie Patino, PharmD (2024)   Next INR check:  3/11/2024   Target end date:      Indications    Anticoagulant long-term use (Resolved) [Z79.01]  H/O mechanical aortic valve replacement (Resolved) [Z95.2]                 Anticoagulation Episode Summary       INR check location:  Clinic Lab    Preferred lab:      Send INR reminders to:  [facility] COUMADIN MONITORING POOL    Comments:  [facility] - [facility] Clinic only Mon - Thu // carpel tunnel release  - target low end of range          Anticoagulation Care Providers       Provider Role Specialty Phone number    Devon Garnica MD [facility] Cardiology [phone]            Patient Findings       Positives:  Change in medications    Negatives:  Signs/symptoms of thrombosis, Signs/symptoms of bleeding, Laboratory test error suspected, Change in health, Change in alcohol use, Change in activity, Upcoming invasive procedure, Emergency department visit, Upcoming dental procedure, Missed doses, Extra doses, Change in diet/appetite, Hospital admission, Bruising, Other complaints    Comments:  3/4/24 dose reviewed, pt was constipated, the  constipation has resolved, pt was prescribed an abx after surgery, name of abx not recalled at this time and pt has 2 days left of the abx per Ameena (Augmentin).

## 2024-03-11 ENCOUNTER — LAB VISIT (OUTPATIENT)
Dept: LAB | Facility: HOSPITAL | Age: 84
End: 2024-03-11
Attending: INTERNAL MEDICINE
Payer: MEDICARE

## 2024-03-11 ENCOUNTER — ANTI-COAG VISIT (OUTPATIENT)
Dept: CARDIOLOGY | Facility: CLINIC | Age: 84
End: 2024-03-11
Payer: MEDICARE

## 2024-03-11 DIAGNOSIS — I48.0 PAROXYSMAL ATRIAL FIBRILLATION: ICD-10-CM

## 2024-03-11 DIAGNOSIS — Z95.2 H/O MECHANICAL AORTIC VALVE REPLACEMENT: ICD-10-CM

## 2024-03-11 DIAGNOSIS — Z79.01 ANTICOAGULANT LONG-TERM USE: ICD-10-CM

## 2024-03-11 LAB
INR PPP: 2.6 (ref 0.8–1.2)
PROTHROMBIN TIME: 26.1 SEC (ref 9–12.5)

## 2024-03-11 PROCEDURE — 85610 PROTHROMBIN TIME: CPT | Mod: HCNC | Performed by: INTERNAL MEDICINE

## 2024-03-11 PROCEDURE — 36415 COLL VENOUS BLD VENIPUNCTURE: CPT | Mod: HCNC | Performed by: INTERNAL MEDICINE

## 2024-03-11 PROCEDURE — 93793 ANTICOAG MGMT PT WARFARIN: CPT | Mod: S$GLB,,,

## 2024-03-11 NOTE — PROGRESS NOTES
[facility]    2024 2:13 PM    Assessment/Plan:    Patient presents today with therapeutic INR.    Recommendation for patient's warfarin regimen: Continue current maintenance dose    Recommend repeat INR in 2 weeks  _________________________________________________________________    Dariel Urbina (83 y.o.) is followed by the [facility].    Anticoagulation Summary  As of 3/11/2024      INR goal:  2.0-3.0   TTR:  68.4 % (11.6 y)   INR used for dosin.6 (3/11/2024)   Warfarin maintenance plan:  7.5 mg (5 mg x 1.5) every Thu; 5 mg (5 mg x 1) all other days   Weekly warfarin total:  37.5 mg   Plan last modified:  Laurie Patino, PharmD (2024)   Next INR check:  3/25/2024   Target end date:      Indications    Anticoagulant long-term use (Resolved) [Z79.01]  H/O mechanical aortic valve replacement (Resolved) [Z95.2]                 Anticoagulation Episode Summary       INR check location:  Clinic Lab    Preferred lab:      Send INR reminders to:  [facility] COUMADIN MONITORING POOL    Comments:  [facility] - Internal Med Clinic only Mon - Thu // carpel tunnel release  - target low end of range          Anticoagulation Care Providers       Provider Role Specialty Phone number    Devon Garnica MD [facility] Cardiology [phone]

## 2024-03-14 ENCOUNTER — TELEPHONE (OUTPATIENT)
Dept: DERMATOLOGY | Facility: CLINIC | Age: 84
End: 2024-03-14
Payer: MEDICARE

## 2024-03-14 NOTE — TELEPHONE ENCOUNTER
Left pt a message to give us a call back in response to him wanting an appt for a lump on his head.

## 2024-03-15 ENCOUNTER — TELEPHONE (OUTPATIENT)
Dept: DERMATOLOGY | Facility: CLINIC | Age: 84
End: 2024-03-15
Payer: MEDICARE

## 2024-03-15 NOTE — TELEPHONE ENCOUNTER
Spoke with pt's wife. She is concerned about a bump growing on his head. Reccommended she make an appointment with his general dermatologist to see if it needs to be biopsied. Pt's wife thanked me for the help.

## 2024-03-18 ENCOUNTER — TELEPHONE (OUTPATIENT)
Dept: INTERNAL MEDICINE | Facility: CLINIC | Age: 84
End: 2024-03-18
Payer: MEDICARE

## 2024-03-18 RX ORDER — TRAZODONE HYDROCHLORIDE 50 MG/1
50 TABLET ORAL NIGHTLY
Qty: 90 TABLET | Refills: 2 | Status: SHIPPED | OUTPATIENT
Start: 2024-03-18

## 2024-03-18 NOTE — TELEPHONE ENCOUNTER
----- Message from Claudia Parnell MA sent at 3/18/2024  9:35 AM CDT -----  Contact: Dariel @ [phone]  Requesting an RX refill or new RX.    RX name and strength: Trazodone    Is this a refill or new RX: Refill    Is this a 30 day or 90 day RX: N/A    Pharmacy name and phone:     [facility]/pharmacy #[id number] - [place] - [phone]  [place] [address]  Phone: [phone] Fax: [phone]

## 2024-03-19 ENCOUNTER — OFFICE VISIT (OUTPATIENT)
Dept: OTOLARYNGOLOGY | Facility: CLINIC | Age: 84
End: 2024-03-19
Payer: MEDICARE

## 2024-03-19 DIAGNOSIS — Z79.01 ANTICOAGULANT LONG-TERM USE: ICD-10-CM

## 2024-03-19 DIAGNOSIS — N18.32 STAGE 3B CHRONIC KIDNEY DISEASE: ICD-10-CM

## 2024-03-19 DIAGNOSIS — R04.0 EPISTAXIS: Primary | ICD-10-CM

## 2024-03-19 DIAGNOSIS — J34.89 NASAL SEPTAL PERFORATION: ICD-10-CM

## 2024-03-19 PROCEDURE — 99024 POSTOP FOLLOW-UP VISIT: CPT | Mod: HCNC,S$GLB,, | Performed by: STUDENT IN AN ORGANIZED HEALTH CARE EDUCATION/TRAINING PROGRAM

## 2024-03-19 PROCEDURE — 99999 PR PBB SHADOW E&M-EST. PATIENT-LVL II: CPT | Mod: PBBFAC,HCNC,, | Performed by: STUDENT IN AN ORGANIZED HEALTH CARE EDUCATION/TRAINING PROGRAM

## 2024-03-19 PROCEDURE — 1160F RVW MEDS BY RX/DR IN RCRD: CPT | Mod: HCNC,CPTII,S$GLB, | Performed by: STUDENT IN AN ORGANIZED HEALTH CARE EDUCATION/TRAINING PROGRAM

## 2024-03-19 PROCEDURE — 1159F MED LIST DOCD IN RCRD: CPT | Mod: HCNC,CPTII,S$GLB, | Performed by: STUDENT IN AN ORGANIZED HEALTH CARE EDUCATION/TRAINING PROGRAM

## 2024-03-19 PROCEDURE — 31237 NSL/SINS NDSC SURG BX POLYPC: CPT | Mod: 79,HCNC,LT,S$GLB | Performed by: STUDENT IN AN ORGANIZED HEALTH CARE EDUCATION/TRAINING PROGRAM

## 2024-03-19 NOTE — HIM RECORD RETIREMENT NOTE
[facility] of Incomplete Medical Record    3/19/24    Patient Name: Dariel Urbina  Contact Serial # (CSN): [id number]  Patient Medical Record # (MRN): [id number]  Date of Service: Anti-coag visit on 5/17/2022  Physician Name: José Miguel Vences     This record has been reviewed and is being retired as incomplete by the approval of the  Medical Staff Operating Committee (MSOC)     On 3/8/2023., due to:  Unavailability of Provider     Missing Information/Comments:  []    Discharge Summary   []    DC Note/Short Stay Summary   []    ED Provider Note   []    Delivery Note   []    History & Physical   []   Operative Note   []     Procedure Note   []     Physician Order   [x]     Verbal Order   []       Other, specify:

## 2024-03-19 NOTE — HIM RECORD RETIREMENT NOTE
[facility] of Incomplete Medical Record    3/19/24    Patient Name: Dariel Urbina  Contact Serial # (CSN): [id number]  Patient Medical Record # (MRN): [id number]  Date of Service: Anti-coag visit on 11/10/2022  Physician Name: José Miguel Vences     This record has been reviewed and is being retired as incomplete by the approval of the  Medical Staff Operating Committee (MSOC)     On 3/8/2023., due to:  Unavailability of Provider     Missing Information/Comments:  []    Discharge Summary   []    DC Note/Short Stay Summary   []    ED Provider Note   []    Delivery Note   []    History & Physical   []   Operative Note   []     Procedure Note   []     Physician Order   [x]     Verbal Order   []       Other, specify:

## 2024-03-19 NOTE — HIM RECORD RETIREMENT NOTE
detention of Incomplete Medical Record    3/19/24    Patient Name: Dariel Urbina  Contact Serial # (CSN): [id number]  Patient Medical Record # (MRN): [id number]  Date of Service: Anti-coag visit on 10/12/2022  Physician Name: José Miguel Vences,     This record has been reviewed and is being retired as incomplete by the approval of the  Medical Staff Operating Committee (MSOC)     On 3/8/2023., due to:  Unavailability of Provider     Missing Information/Comments:  []    Discharge Summary   []    DC Note/Short Stay Summary   []    ED Provider Note   []    Delivery Note   []    History & Physical   []   Operative Note   []     Procedure Note   []     Physician Order   [x]     Verbal Order   []       Other, specify:

## 2024-03-19 NOTE — HIM RECORD RETIREMENT NOTE
[facility] of Incomplete Medical Record    3/19/24    Patient Name: Dariel Urbina  Contact Serial # (CSN): [id number]  Patient Medical Record # (MRN): [id number]  Date of Service: Anti-coag visit on 10/12/2022  Physician Name: José Miguel Vences     This record has been reviewed and is being retired as incomplete by the approval of the  Medical Staff Operating Committee (MSOC)     On 5/3/2023., due to:  Unavailability of Provider     Missing Information/Comments:  []    Discharge Summary   []    DC Note/Short Stay Summary   []    ED Provider Note   []    Delivery Note   []    History & Physical   []   Operative Note   []     Procedure Note   []     Physician Order   [x]     Verbal Order   []       Other, specify:

## 2024-03-19 NOTE — PROGRESS NOTES
Subjective:      Dariel is a 83 y.o. male who comes for follow-up of  epistaxis .  His last visit with me was on 2/20/2024.  He previously undergone bilateral SP ligation followed by bilateral embolization, cautery on the left septum for persistent bleeding on 11/03/2023 and again recently on 12/18/23. He recently underwent left AEA ligation on 2/28/24. Since surgery has had no large episodes of bleeding. Does have some clots that pass in his nose. No active bleeding/BRB noted by patient or wife.     His current sinus regime consists of: Saline rinses.     The patient's medications, allergies, past medical, surgical, social and family histories were reviewed and updated as appropriate.    ROS     A detailed review of systems was obtained with pertinent positives as per the above HPI, and otherwise negative.        Objective:     There were no vitals taken for this visit.       Constitutional:   Vital signs are normal. He appears well-developed and well-nourished.     Head:  Normocephalic and atraumatic.     Ears:  Hearing normal to normal and whispered voice; external ear normal without scars, lesions, or masses; ear canal, tympanic membrane, and middle ear normal..   Right Ear: No swelling. Tympanic membrane is not perforated and not bulging. No middle ear effusion.   Left Ear: No swelling. Tympanic membrane is not perforated and not bulging.  No middle ear effusion.     Nose:  Nose normal including turbinates, nasal mucosa, sinuses and nasal septum. No epistaxis.     Mouth/Throat  Oropharynx clear and moist without lesions or asymmetry and normal uvula midline. Normal dentition. No tonsillar abscesses. Tonsillar exudate.      Neck:  Neck normal without thyromegaly masses, asymmetry, normal tracheal structure, crepitus, and tenderness, thyroid normal, trachea normal, phonation normal, full range of motion with neck supple and no adenopathy. No stridor present.        Head (right side): No submental adenopathy  "present.        Head (left side): No submental adenopathy present.     He has no cervical adenopathy.     Pulmonary/Chest:   No stridor.       Procedure    Nasal endoscopy with debridement performed.  See procedure note.    Nasal Endoscopy with Debridement, left.    Nasal endoscopy and debridement were performed in accordance with the 0 day global period for endoscopic sinus surgery and are unrelated to any other recently performed procedures.     Topical Agents: Topical 0.25% phenylephrine and 4% lidocaine    A 0 degree rigid nasal endoscope was inserted into the nose to visualize the nasal passageway and paranasal sinuses. Under endoscopic visualization, a combination of instruments including suction and grasping forceps were used to debride crust, debris, inflammatory tissue and nasal polyps from the nasal cavity, paranasal sinuses and sinus drainage pathways. This was performed to aid in healing and optimize the patency and function of the sinus cavities and nasal passageways. This is necessary to avoid scar formation, infection, and mucocele formation. In addition, this facilitates in the optimal instillation of topical therapies, saline irrigations, long-term disease surveillance, and endoscopically-derived cultures. The endoscope was withdrawn without sequelae. The procedure was well tolerated by the patient.    Stable appearing anterior septal perforation.  Old blood products and crusting noted along the left septum.  Removed without any evidence of bleeding.    Data Reviewed    WBC (K/uL)   Date Value   02/28/2024 5.82     Eosinophil % (%)   Date Value   02/28/2024 1.4     Eos # (K/uL)   Date Value   02/28/2024 0.1     Platelets (K/uL)   Date Value   02/28/2024 170     Glucose (mg/dL)   Date Value   02/19/2024 229 (H)     No results found for: "IGE"    No sinus imaging available.      Assessment:     1. Epistaxis    2. Stage 3b chronic kidney disease    3. Anticoagulant long-term use    4. Nasal septal " perforation       Plan:     - no further bleeding noted.  - continue aggressive nasal saline.  - RTC 1 month.    Jono Russell MD

## 2024-03-28 ENCOUNTER — LAB VISIT (OUTPATIENT)
Dept: LAB | Facility: HOSPITAL | Age: 84
End: 2024-03-28
Attending: INTERNAL MEDICINE
Payer: MEDICARE

## 2024-03-28 ENCOUNTER — ANTI-COAG VISIT (OUTPATIENT)
Dept: CARDIOLOGY | Facility: CLINIC | Age: 84
End: 2024-03-28
Payer: MEDICARE

## 2024-03-28 DIAGNOSIS — Z79.01 ANTICOAGULANT LONG-TERM USE: ICD-10-CM

## 2024-03-28 DIAGNOSIS — I48.0 PAROXYSMAL ATRIAL FIBRILLATION: ICD-10-CM

## 2024-03-28 DIAGNOSIS — E11.51 TYPE 2 DIABETES MELLITUS WITH DIABETIC PERIPHERAL ANGIOPATHY WITHOUT GANGRENE, WITHOUT LONG-TERM CURRENT USE OF INSULIN: ICD-10-CM

## 2024-03-28 DIAGNOSIS — D64.9 ANEMIA, UNSPECIFIED TYPE: ICD-10-CM

## 2024-03-28 DIAGNOSIS — Z95.2 H/O MECHANICAL AORTIC VALVE REPLACEMENT: ICD-10-CM

## 2024-03-28 LAB
ALBUMIN SERPL BCP-MCNC: 3.6 G/DL (ref 3.5–5.2)
ALP SERPL-CCNC: 77 U/L (ref 55–135)
ALT SERPL W/O P-5'-P-CCNC: 13 U/L (ref 10–44)
ANION GAP SERPL CALC-SCNC: 6 MMOL/L (ref 8–16)
AST SERPL-CCNC: 17 U/L (ref 10–40)
BASOPHILS # BLD AUTO: 0.04 K/UL (ref 0–0.2)
BASOPHILS NFR BLD: 1 % (ref 0–1.9)
BILIRUB SERPL-MCNC: 0.5 MG/DL (ref 0.1–1)
BUN SERPL-MCNC: 39 MG/DL (ref 8–23)
CALCIUM SERPL-MCNC: 10.5 MG/DL (ref 8.7–10.5)
CHLORIDE SERPL-SCNC: 109 MMOL/L (ref 95–110)
CO2 SERPL-SCNC: 26 MMOL/L (ref 23–29)
CREAT SERPL-MCNC: 2.3 MG/DL (ref 0.5–1.4)
DIFFERENTIAL METHOD BLD: ABNORMAL
EOSINOPHIL # BLD AUTO: 0.2 K/UL (ref 0–0.5)
EOSINOPHIL NFR BLD: 5.2 % (ref 0–8)
ERYTHROCYTE [DISTWIDTH] IN BLOOD BY AUTOMATED COUNT: 15.5 % (ref 11.5–14.5)
EST. GFR  (NO RACE VARIABLE): 27.7 ML/MIN/1.73 M^2
ESTIMATED AVG GLUCOSE: 123 MG/DL (ref 68–131)
GLUCOSE SERPL-MCNC: 97 MG/DL (ref 70–110)
HBA1C MFR BLD: 5.9 % (ref 4–5.6)
HCT VFR BLD AUTO: 33.1 % (ref 40–54)
HGB BLD-MCNC: 9.9 G/DL (ref 14–18)
IMM GRANULOCYTES # BLD AUTO: 0.01 K/UL (ref 0–0.04)
IMM GRANULOCYTES NFR BLD AUTO: 0.3 % (ref 0–0.5)
INR PPP: 1.5 (ref 0.8–1.2)
LYMPHOCYTES # BLD AUTO: 1 K/UL (ref 1–4.8)
LYMPHOCYTES NFR BLD: 25 % (ref 18–48)
MCH RBC QN AUTO: 29.6 PG (ref 27–31)
MCHC RBC AUTO-ENTMCNC: 29.9 G/DL (ref 32–36)
MCV RBC AUTO: 99 FL (ref 82–98)
MONOCYTES # BLD AUTO: 0.5 K/UL (ref 0.3–1)
MONOCYTES NFR BLD: 13.8 % (ref 4–15)
NEUTROPHILS # BLD AUTO: 2.1 K/UL (ref 1.8–7.7)
NEUTROPHILS NFR BLD: 54.7 % (ref 38–73)
NRBC BLD-RTO: 0 /100 WBC
PLATELET # BLD AUTO: 153 K/UL (ref 150–450)
PMV BLD AUTO: 11.9 FL (ref 9.2–12.9)
POTASSIUM SERPL-SCNC: 5 MMOL/L (ref 3.5–5.1)
PROT SERPL-MCNC: 7 G/DL (ref 6–8.4)
PROTHROMBIN TIME: 15.6 SEC (ref 9–12.5)
RBC # BLD AUTO: 3.34 M/UL (ref 4.6–6.2)
SODIUM SERPL-SCNC: 141 MMOL/L (ref 136–145)
WBC # BLD AUTO: 3.84 K/UL (ref 3.9–12.7)

## 2024-03-28 PROCEDURE — 36415 COLL VENOUS BLD VENIPUNCTURE: CPT | Mod: HCNC | Performed by: INTERNAL MEDICINE

## 2024-03-28 PROCEDURE — 83036 HEMOGLOBIN GLYCOSYLATED A1C: CPT | Mod: HCNC | Performed by: INTERNAL MEDICINE

## 2024-03-28 PROCEDURE — 93793 ANTICOAG MGMT PT WARFARIN: CPT | Mod: S$GLB,,,

## 2024-03-28 PROCEDURE — 85025 COMPLETE CBC W/AUTO DIFF WBC: CPT | Mod: HCNC | Performed by: INTERNAL MEDICINE

## 2024-03-28 PROCEDURE — 85610 PROTHROMBIN TIME: CPT | Mod: HCNC | Performed by: INTERNAL MEDICINE

## 2024-03-28 PROCEDURE — 80053 COMPREHEN METABOLIC PANEL: CPT | Mod: HCNC | Performed by: INTERNAL MEDICINE

## 2024-03-28 NOTE — PROGRESS NOTES
[facility]    2024 10:10 AM    Assessment/Plan:    Patient presents today with subtherapeutic  INR.    Assessment of patient findings and chart review: patient missed x2 doses this week, based on prior sub therapeutic INRs, will boost with 7.5mg x1 tonight    Recommendation for patient's warfarin regimen: Boost dose today to 7.5mg then resume current maintenance dose    Recommend repeat INR in 1 week  _________________________________________________________________    Dariel Urbina (83 y.o.) is followed by the [facility].    Anticoagulation Summary  As of 3/28/2024      INR goal:  2.0-3.0   TTR:  68.3 % (11.6 y)   INR used for dosin.5 (3/28/2024)   Warfarin maintenance plan:  7.5 mg (5 mg x 1.5) every Thu; 5 mg (5 mg x 1) all other days   Weekly warfarin total:  37.5 mg   Plan last modified:  Laurie Patino, PharmD (2024)   Next INR check:  2024   Target end date:      Indications    Anticoagulant long-term use (Resolved) [Z79.01]  H/O mechanical aortic valve replacement (Resolved) [Z95.2]                 Anticoagulation Episode Summary       INR check location:  Clinic Lab    Preferred lab:      Send INR reminders to:  [facility] COUMADIN MONITORING POOL    Comments:  [facility] only Mon - Thu // carpel tunnel release  - target low end of range          Anticoagulation Care Providers       Provider Role Specialty Phone number    Devon Garnica MD [facility] Cardiology [phone]            Patient Findings       Positives:  Missed doses    Negatives:  Signs/symptoms of bleeding, Laboratory test error suspected, Change in health, Change in alcohol use, Change in activity, Upcoming invasive procedure, Emergency department visit, Upcoming dental procedure, Extra doses, Change in medications, Change in diet/appetite, Hospital admission, Bruising, Other complaints    Comments:  3/28/24  pt missed Monday and Tuesday's doses and no other changes reported by pt/pt's wife.

## 2024-04-05 ENCOUNTER — LAB VISIT (OUTPATIENT)
Dept: LAB | Facility: HOSPITAL | Age: 84
End: 2024-04-05
Attending: INTERNAL MEDICINE
Payer: MEDICARE

## 2024-04-05 ENCOUNTER — OFFICE VISIT (OUTPATIENT)
Dept: INTERNAL MEDICINE | Facility: CLINIC | Age: 84
End: 2024-04-05
Payer: MEDICARE

## 2024-04-05 ENCOUNTER — ANTI-COAG VISIT (OUTPATIENT)
Dept: CARDIOLOGY | Facility: CLINIC | Age: 84
End: 2024-04-05
Payer: MEDICARE

## 2024-04-05 VITALS
OXYGEN SATURATION: 95 % | SYSTOLIC BLOOD PRESSURE: 172 MMHG | BODY MASS INDEX: 27.63 KG/M2 | HEIGHT: 65 IN | HEART RATE: 60 BPM | WEIGHT: 165.81 LBS | DIASTOLIC BLOOD PRESSURE: 58 MMHG

## 2024-04-05 DIAGNOSIS — Z95.2 H/O MECHANICAL AORTIC VALVE REPLACEMENT: ICD-10-CM

## 2024-04-05 DIAGNOSIS — R04.0 RECURRENT EPISTAXIS: ICD-10-CM

## 2024-04-05 DIAGNOSIS — E11.69 HYPERLIPIDEMIA ASSOCIATED WITH TYPE 2 DIABETES MELLITUS: ICD-10-CM

## 2024-04-05 DIAGNOSIS — I15.2 HYPERTENSION ASSOCIATED WITH DIABETES: ICD-10-CM

## 2024-04-05 DIAGNOSIS — I48.0 PAROXYSMAL ATRIAL FIBRILLATION: ICD-10-CM

## 2024-04-05 DIAGNOSIS — N25.81 SECONDARY HYPERPARATHYROIDISM OF RENAL ORIGIN: ICD-10-CM

## 2024-04-05 DIAGNOSIS — N18.4 CKD (CHRONIC KIDNEY DISEASE), STAGE IV: Primary | ICD-10-CM

## 2024-04-05 DIAGNOSIS — E78.5 HYPERLIPIDEMIA ASSOCIATED WITH TYPE 2 DIABETES MELLITUS: ICD-10-CM

## 2024-04-05 DIAGNOSIS — Z79.01 ANTICOAGULANT LONG-TERM USE: ICD-10-CM

## 2024-04-05 DIAGNOSIS — E11.59 HYPERTENSION ASSOCIATED WITH DIABETES: ICD-10-CM

## 2024-04-05 DIAGNOSIS — E11.51 TYPE 2 DIABETES MELLITUS WITH DIABETIC PERIPHERAL ANGIOPATHY WITHOUT GANGRENE, WITHOUT LONG-TERM CURRENT USE OF INSULIN: ICD-10-CM

## 2024-04-05 PROBLEM — I27.9 PULMONARY HEART DISEASE: Status: RESOLVED | Noted: 2018-02-20 | Resolved: 2024-04-05

## 2024-04-05 PROBLEM — I50.33 ACUTE ON CHRONIC DIASTOLIC HEART FAILURE: Status: RESOLVED | Noted: 2024-01-03 | Resolved: 2024-04-05

## 2024-04-05 PROBLEM — I50.42 CHRONIC COMBINED SYSTOLIC AND DIASTOLIC HEART FAILURE: Status: RESOLVED | Noted: 2022-12-31 | Resolved: 2024-04-05

## 2024-04-05 LAB
INR PPP: 1.7 (ref 0.8–1.2)
PROTHROMBIN TIME: 17.2 SEC (ref 9–12.5)

## 2024-04-05 PROCEDURE — 36415 COLL VENOUS BLD VENIPUNCTURE: CPT | Mod: HCNC | Performed by: INTERNAL MEDICINE

## 2024-04-05 PROCEDURE — 1101F PT FALLS ASSESS-DOCD LE1/YR: CPT | Mod: HCNC,CPTII,S$GLB, | Performed by: INTERNAL MEDICINE

## 2024-04-05 PROCEDURE — 1126F AMNT PAIN NOTED NONE PRSNT: CPT | Mod: HCNC,CPTII,S$GLB, | Performed by: INTERNAL MEDICINE

## 2024-04-05 PROCEDURE — 85610 PROTHROMBIN TIME: CPT | Mod: HCNC | Performed by: INTERNAL MEDICINE

## 2024-04-05 PROCEDURE — 3077F SYST BP >= 140 MM HG: CPT | Mod: HCNC,CPTII,S$GLB, | Performed by: INTERNAL MEDICINE

## 2024-04-05 PROCEDURE — 3288F FALL RISK ASSESSMENT DOCD: CPT | Mod: HCNC,CPTII,S$GLB, | Performed by: INTERNAL MEDICINE

## 2024-04-05 PROCEDURE — 3078F DIAST BP <80 MM HG: CPT | Mod: HCNC,CPTII,S$GLB, | Performed by: INTERNAL MEDICINE

## 2024-04-05 PROCEDURE — 99999 PR PBB SHADOW E&M-EST. PATIENT-LVL IV: CPT | Mod: PBBFAC,HCNC,, | Performed by: INTERNAL MEDICINE

## 2024-04-05 PROCEDURE — 93793 ANTICOAG MGMT PT WARFARIN: CPT | Mod: S$GLB,,,

## 2024-04-05 PROCEDURE — 99214 OFFICE O/P EST MOD 30 MIN: CPT | Mod: HCNC,S$GLB,, | Performed by: INTERNAL MEDICINE

## 2024-04-05 RX ORDER — AMLODIPINE BESYLATE 5 MG/1
5 TABLET ORAL DAILY
Qty: 90 TABLET | Refills: 3 | Status: ON HOLD | OUTPATIENT
Start: 2024-04-05 | End: 2024-06-19 | Stop reason: HOSPADM

## 2024-04-05 RX ORDER — ISOSORBIDE MONONITRATE 120 MG/1
120 TABLET, EXTENDED RELEASE ORAL NIGHTLY
Qty: 90 TABLET | Refills: 3 | Status: CANCELLED | OUTPATIENT
Start: 2024-04-05

## 2024-04-05 RX ORDER — FERROUS GLUCONATE 324(38)MG
324 TABLET ORAL
COMMUNITY
End: 2024-04-29

## 2024-04-05 NOTE — PROGRESS NOTES
[facility]  RESIDENT CONTINUITY CLINIC NOTE    Name: Dariel Urbina  : 1940  MRN: [id number]  Date of Service: 2024   PCP: Devon Langston Jr., MD          HPI:           Dariel Urbina is a 83 y.o. male who presents for follow up.      He has diabetes-- We stopped his glimepiride due to low glucoses.  Recent hgb A1c was 5.9.       He is on amaryl 1mg last visit.   Weight today is 165 # lbs down from 176 last visit- Patient denies polyuria, polydipsia, visual disturbances. He has been eating less but denies decreased appetite. Wife cooks lunch then he does not eat dinner. Has eye doctor appointment coming up.             He has htn.  Does not check at home frequently but he does have an arm cuff. He is back on bumex 1 mg q d, toprol 25, imdur 60 mg a day. CAD s/p CABG (), mechanical AVR (on warfarin), moderate-severe MR--MitraClip.  KIRSTEN performed  and showed moderate MR. 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 Afib.  Hs last cardiovascular stent was in 2021.  He has had severe calcific atherosclerosis seen on multiple ct scans over time.  Denies exertional chest pain.           He continues to have epistaxis - He did have 2 days with out any incidnet but had a nose bleed Monday.  He is seeing ENt regularly and has underwent endoscopy nasal cautery on 11/3/23 .  He also received 1 unit PRBC after this.   He previously underwent sphenopalatine artery ligation on 2023  HGB was 8.5 on 2023.    .   He is on both Coumadin and Plavix any has history of anemia. He had another ligation procedure 24 with Dr. Russell which stopped the bleeding for about 2 weeks; however, the bleeding returned, and he continues to have daily episodes of nose bleeds sometimes lasting 3-4 hours. Denies feeling lightheaded with episodes. Last episode this morning before appointment that resolved with Afrin quickly. Afrin does not always self.         Reports that he saw a  "doctor in [place] for blockages in his legs at his groin. Decided to hold of on revascularization due to recurrent epistaxis and recommended continuing statin and rehab if tolerated. Reports not being able to walk very far because his hips will get locked and have low back pain. Denies calf cramping.  ABIs 9/26/22  Resting POOL's are normal.  Exercise POOL's are reduced consistent with modderate to severe right and mild left LE PAD.  PVR's are biphasic right and triphasic in the left lower extremity.           Hyperlipidemia. On Crestor 40  mg , --Recent lipid panel was reviewed.  . He says he is taking all his cholesterol meds. Chol 1115, hdl 37, Ldl 47            CKD stage IV with proteinuria. Pt saw Nephrology in May 2023 -He does not have follow ups scheduled -   He has had some acute on chronic kidney failure.  Most recent creatinine is 2.3.    I year ago his creatinine was 2.3.   His potasium is  5.0.  He has been instructed on a a low potasium diet by nephrology, but is not following one.           H/o partial colon resection due to diverticulosis. See above.  NO GI  bleeding since last visit.  NO recent diarrhea.             AAA- last us Showed 3.32 cm AAA- (9/29/22 )largest supra renal area. since last visit -            H/o gout. On allopurinol. Last flare was  a couple of years ago in left foot. He remains on Allopurinol. He is staying way form seafood.     Review of systems as above; all unmentioned systems are negative.     PEX:     Vitals:    04/05/24 0827 04/05/24 0857   BP: (!) 160/78 (!) 172/58   BP Location: Right arm    Patient Position: Sitting    BP Method: Large (Manual)    Pulse: 72 60   SpO2: 95%    Weight: 75.2 kg (165 lb 12.6 oz)    Height: 5' 5" (1.651 m)      Body mass index is 27.59 kg/m².    Physical Exam  Vitals reviewed.   Constitutional:       General: He is not in acute distress.     Appearance: He is not ill-appearing or toxic-appearing.   HENT:      Head: Normocephalic and " atraumatic.   Eyes:      General: No scleral icterus.  Cardiovascular:      Rate and Rhythm: Normal rate. Rhythm irregular.      Comments: Metallic click  Pulmonary:      Effort: Pulmonary effort is normal. No respiratory distress.      Breath sounds: No wheezing or rales.   Abdominal:      General: Abdomen is flat. There is no distension.      Tenderness: There is no abdominal tenderness.   Musculoskeletal:      Right lower leg: Edema (1+ at ankles) present.      Left lower leg: Edema present.   Skin:     General: Skin is warm.   Neurological:      Mental Status: He is alert and oriented to person, place, and time. Mental status is at baseline.      Motor: No weakness.              Other pertinent data from chart that formed part of my MDM:             Current Outpatient Medications:     acetaminophen (TYLENOL) 500 MG tablet, Take 500 mg by mouth daily as needed for Pain., Disp: , Rfl:     allopurinoL (ZYLOPRIM) 100 MG tablet, Take 1 tablet (100 mg total) by mouth once daily., Disp: 90 tablet, Rfl: 3    bumetanide (BUMEX) 1 MG tablet, Take 1 tablet (1 mg total) by mouth every other day., Disp: 45 tablet, Rfl: 3    ferrous gluconate (FERGON) 324 MG tablet, Take 324 mg by mouth daily with breakfast., Disp: , Rfl:     isosorbide mononitrate (IMDUR) 60 MG 24 hr tablet, Take 1 tablet (60 mg total) by mouth every evening., Disp: 90 tablet, Rfl: 3    metoprolol succinate (TOPROL-XL) 25 MG 24 hr tablet, Take 1 tablet (25 mg total) by mouth once daily., Disp: 90 tablet, Rfl: 3    omega-3 fatty acids/fish oil (FISH OIL-OMEGA-3 FATTY ACIDS) 300-1,000 mg capsule, Take 1 capsule by mouth once daily., Disp: , Rfl:     rosuvastatin (CRESTOR) 40 MG Tab, TAKE 1 TABLET EVERY EVENING., Disp: 90 tablet, Rfl: 3    sodium chloride (SALINE NASAL) 0.65 % nasal spray, 2 sprays by Nasal route 3 (three) times daily., Disp: 44 mL, Rfl: 3    traZODone (DESYREL) 50 MG tablet, Take 1 tablet (50 mg total) by mouth every evening., Disp: 90 tablet,  Rfl: 2    warfarin (COUMADIN) 5 MG tablet, Take warfarin 2.5mg PO Saturday, then take 5mg PO all other days or as instructed by Coumadin Clinic, Disp: 35 tablet, Rfl: 3    amLODIPine (NORVASC) 5 MG tablet, Take 1 tablet (5 mg total) by mouth once daily., Disp: 90 tablet, Rfl: 3  No current facility-administered medications for this visit.    Facility-Administered Medications Ordered in Other Visits:     ceFAZolin 2 g in dextrose 5 % in water (D5W) 50 mL IVPB (MB+), 2 g, Intravenous, On Call Procedure, Yenifer Sandoval MD    HYDROmorphone injection 0.2 mg, 0.2 mg, Intravenous, Q5 Min PRN, Saeed Farrell MD    sodium chloride 0.9% flush 10 mL, 10 mL, Intravenous, PRN, Saeed Farrell MD   Past Medical History:   Diagnosis Date    Anemia of chronic renal failure, stage 3 (moderate) 05/27/2015    Anticoagulant long-term use     Atherosclerosis of coronary artery bypass graft of native heart without angina pectoris 09/11/2012    3-27-18 [facility] Two vessel coronary artery disease.   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    Bilateral carotid artery disease 02/09/2017    Bleeding from the nose     Bleeding nose 03/21/2018    Cancer     Cataract     CHF (congestive heart failure)     CKD (chronic kidney disease) stage 3, GFR 30-59 ml/min 05/27/2015    Claudication of left lower extremity 09/17/2014    Colon polyp     Encounter for blood transfusion     Gastroesophageal reflux disease without esophagitis 03/19/2018    Gastrointestinal hemorrhage associated with intestinal diverticulosis 04/01/2018    Glaucoma     H/O mechanical aortic valve replacement 09/17/2014    History of gout 09/26/2012    Hyperparathyroidism due to renal insufficiency 07/27/2015    Internal hemorrhoid 04/03/2018    Long term current use of anticoagulant therapy 09/26/2012    Mechanical heart valve present     Metabolic acidosis with normal anion gap and bicarbonate losses 03/20/2018    Mixed hyperlipidemia 09/26/2012     NSTEMI (non-ST elevated myocardial infarction) 03/21/2018    Obesity, diabetes, and hypertension syndrome 02/23/2016    Paroxysmal atrial fibrillation 02/06/2023    PVD (peripheral vascular disease) 09/11/2012    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     Past Surgical History:   Procedure Laterality Date    BACK SURGERY      CARDIAC CATHETERIZATION      CARDIAC VALVE REPLACEMENT      CARDIAC VALVE SURGERY      CARPAL TUNNEL RELEASE Right 05/19/2020    Procedure: RELEASE, CARPAL TUNNEL;  Surgeon: Rupesh Norris Jr., MD;  Location: [facility];  Service: Plastics;  Laterality: Right;    CATARACT EXTRACTION Left 11/13/2022        COLON SURGERY      COLONOSCOPY N/A 03/31/2017    Procedure: COLONOSCOPY;  Surgeon: Bruno Raymond MD;  Location: [facility] (4TH FLR);  Service: Endoscopy;  Laterality: N/A;  Patient's wife requesting date.    COLONOSCOPY N/A 04/03/2018    Procedure: COLONOSCOPY;  Surgeon: Bonifacio Pelletier MD;  Location: [facility] (2ND FLR);  Service: Endoscopy;  Laterality: N/A;    COLONOSCOPY N/A 08/13/2018    Procedure: COLONOSCOPY;  Surgeon: Kam Barba MD;  Location: [facility] (2ND FLR);  Service: Endoscopy;  Laterality: N/A;  2nd floor: PA pressure 49; hx of moderate-severe valve disease     per Coumadin clinic-Patient can hold 5 days with lovenox bridge       ok to schedule per Katarina    CORONARY ANGIOGRAPHY N/A 10/04/2021    Procedure: Left heart cath +/- peripheral angiogram;  Surgeon: Jose Ruiz MD;  Location: [facility] CATH LAB;  Service: Cardiology;  Laterality: N/A;    CORONARY ANGIOGRAPHY N/A 3/2/2023    Procedure: Angiogram, Coronary;  Surgeon: Devon Garnica MD;  Location: [facility]  CATH LAB;  Service: Cardiology;  Laterality: N/A;    CORONARY ANGIOPLASTY      CORONARY ARTERY BYPASS GRAFT      CORONARY BYPASS GRAFT ANGIOGRAPHY  10/04/2021    Procedure: Bypass graft study;  Surgeon: Jose Ruiz MD;  Location: [facility] CATH LAB;  Service: Cardiology;;    CORONARY BYPASS GRAFT ANGIOGRAPHY  3/2/2023    Procedure: Bypass graft study;  Surgeon: Devon Garnica MD;  Location: [facility] CATH LAB;  Service: Cardiology;;    ECHOCARDIOGRAM,TRANSESOPHAGEAL N/A 4/14/2023    Procedure: Transesophageal echo (KIRSTEN) intra-procedure log documentation;  Surgeon: [facility] Diagnostic Provider;  Location: [facility] EP LAB;  Service: Cardiology;  Laterality: N/A;    ENDOSCOPIC NASAL CAUTERIZATION Bilateral 11/3/2023    Procedure: CAUTERIZATION, NOSE, ENDOSCOPIC;  Surgeon: Jono Russell MD;  Location: [facility] OR 2ND FLR;  Service: ENT;  Laterality: Bilateral;    ENDOSCOPIC NASAL CAUTERIZATION N/A 12/18/2023    Procedure: CAUTERIZATION, NOSE, ENDOSCOPIC;  Surgeon: Jono Russell MD;  Location: [facility] OR 2ND FLR;  Service: ENT;  Laterality: N/A;    EYE SURGERY      FESS, WITH IMAGING GUIDANCE Left 2/28/2024    Procedure: FESS, WITH IMAGING GUIDANCE;  Surgeon: Jono Russell MD;  Location: [facility] OR 2ND FLR;  Service: ENT;  Laterality: Left;  Scan loaded ENT [employer]. CL    FUNCTIONAL ENDOSCOPIC SINUS SURGERY (FESS) Bilateral 6/14/2023    Procedure: FESS (FUNCTIONAL ENDOSCOPIC SINUS SURGERY);  Surgeon: Jono Russell MD;  Location: [facility] OR 2ND FLR;  Service: ENT;  Laterality: Bilateral;    HERNIA REPAIR      INTRAOCULAR PROSTHESES INSERTION Left 11/13/2022    Procedure: INSERTION, IOL PROSTHESIS;  Surgeon: Alia Mckeon MD;  Location: [facility] OR 1ST FLR;  Service: Ophthalmology;  Laterality: Left;    INTRAOCULAR PROSTHESES INSERTION Right 12/04/2022    Procedure: INSERTION, IOL PROSTHESIS;  Surgeon: Alia Mckeon MD;  Location: [facility] OR 1ST FLR;  Service: Ophthalmology;  Laterality: Right;    LIGATION, ARTERY, ETHMOIDAL Left  2/28/2024    Procedure: LIGATION, ARTERY, ETHMOIDAL;  Surgeon: Jono Russell MD;  Location: [facility] OR 2ND FLR;  Service: ENT;  Laterality: Left;    LIGATION, ARTERY, SPHENOPALATINE, ENDOSCOPIC Bilateral 6/14/2023    Procedure: LIGATION, ARTERY, SPHENOPALATINE, ENDOSCOPIC;  Surgeon: Jono Russell MD;  Location: [facility] OR 2ND FLR;  Service: ENT;  Laterality: Bilateral;    PHACOEMULSIFICATION OF CATARACT Left 11/13/2022    Procedure: PHACOEMULSIFICATION, CATARACT;  Surgeon: Alia Mckeon MD;  Location: [facility] OR 1ST FLR;  Service: Ophthalmology;  Laterality: Left;    PHACOEMULSIFICATION OF CATARACT Right 12/04/2022    Procedure: PHACOEMULSIFICATION, CATARACT;  Surgeon: Alia Mckeon MD;  [facility];  Service: Ophthalmology;  Laterality: Right;    SPINE SURGERY      TRANSESOPHAGEAL ECHOCARDIOGRAPHY N/A 3/22/2023    Procedure: ECHOCARDIOGRAM, TRANSESOPHAGEAL;  Surgeon: [facility] Diagnostic Provider;  Location: [facility] EP LAB;  Service: Anesthesiology;  Laterality: N/A;    TRANSESOPHAGEAL ECHOCARDIOGRAPHY N/A 4/11/2023    Procedure: ECHOCARDIOGRAM, TRANSESOPHAGEAL;  Surgeon: D[facility] Diagnostic Provider;  Location: [facility] EP LAB;  Service: Anesthesiology;  Laterality: N/A;    VASECTOMY       Family History   Problem Relation Age of Onset    Heart failure Mother     Heart disease Mother     Heart failure Father     Heart disease Father     Alcohol abuse Father     Heart failure Brother     Heart disease Brother     Diabetes Brother     No Known Problems Sister     No Known Problems Maternal Grandmother     No Known Problems Maternal Grandfather     No Known Problems Paternal Grandmother     No Known Problems Paternal Grandfather     Heart disease Sister     No Known Problems Maternal Aunt     No Known Problems Maternal Uncle     No Known Problems Paternal Aunt     No Known Problems Paternal Uncle     Amblyopia Neg Hx     Blindness Neg Hx     Cancer Neg Hx     Cataracts Neg Hx     Glaucoma Neg Hx     Hypertension  Neg Hx     Macular degeneration Neg Hx     Retinal detachment Neg Hx     Strabismus Neg Hx     Stroke Neg Hx     Thyroid disease Neg Hx     Anemia Neg Hx     Arrhythmia Neg Hx     Asthma Neg Hx     Clotting disorder Neg Hx     Fainting Neg Hx     Heart attack Neg Hx     Hyperlipidemia Neg Hx     Atrial Septal Defect Neg Hx     Melanoma Neg Hx      Social History     Socioeconomic History    Marital status:      Spouse name: Ameena    Number of children: 3   Occupational History    Occupation: retired   Tobacco Use    Smoking status: Former     Current packs/day: 0.00     Average packs/day: 1 pack/day for 20.0 years (20.0 ttl pk-yrs)     Types: Cigarettes     Start date: 1960     Quit date: 1980     Years since quittin.5     Passive exposure: Never    Smokeless tobacco: Never   Substance and Sexual Activity    Alcohol use: No    Drug use: No    Sexual activity: Not Currently     Partners: Female     Social Determinants of Health     Financial Resource Strain: Low Risk  (2023)    Overall Financial Resource Strain (CARDIA)     Difficulty of Paying Living Expenses: Not hard at all   Food Insecurity: No Food Insecurity (2023)    Hunger Vital Sign     Worried About Running Out of Food in the Last Year: Never true     Ran Out of Food in the Last Year: Never true   Transportation Needs: No Transportation Needs (2023)    PRAPARE - Transportation     Lack of Transportation (Medical): No     Lack of Transportation (Non-Medical): No   Physical Activity: Insufficiently Active (2023)    Exercise Vital Sign     Days of Exercise per Week: 1 day     Minutes of Exercise per Session: 10 min   Stress: No Stress Concern Present (2023)    [place] [employer] - Occupational Stress Questionnaire     Feeling of Stress : Only a little   Social Connections: Moderately Integrated (2023)    Social Connection and Isolation Panel [NHANES]     Frequency of Communication  with Friends and Family: More than three times a week     Frequency of Social Gatherings with Friends and Family: More than three times a week     Attends Druze Services: More than 4 times per year     Active Member of Clubs or Organizations: No     Attends Club or Organization Meetings: Never     Marital Status:    Recent Concern: Social Connections - Moderately Isolated (10/9/2023)    Social Connection and Isolation Panel [NHANES]     Frequency of Communication with Friends and Family: More than three times a week     Frequency of Social Gatherings with Friends and Family: More than three times a week     Attends Druze Services: Never     Active Member of Clubs or Organizations: No     Attends Club or Organization Meetings: Never     Marital Status:    Housing Stability: Low Risk  (12/16/2023)    Housing Stability Vital Sign     Unable to Pay for Housing in the Last Year: No     Number of Places Lived in the Last Year: 1     Unstable Housing in the Last Year: No       Labs: Previous labs reviewed.  Lab Results   Component Value Date    WBC 3.84 (L) 03/28/2024    HGB 9.9 (L) 03/28/2024    HCT 33.1 (L) 03/28/2024    MCV 99 (H) 03/28/2024     03/28/2024         Lab Results   Component Value Date     03/28/2024    K 5.0 03/28/2024     03/28/2024    CO2 26 03/28/2024    GLU 97 03/28/2024    BUN 39 (H) 03/28/2024    CREATININE 2.3 (H) 03/28/2024    CALCIUM 10.5 03/28/2024    PROT 7.0 03/28/2024    ALBUMIN 3.6 03/28/2024    BILITOT 0.5 03/28/2024    ALKPHOS 77 03/28/2024    AST 17 03/28/2024    ALT 13 03/28/2024    ANIONGAP 6 (L) 03/28/2024    EGFRNORACEVR 27.7 (A) 03/28/2024       Imaging: Previous imaging reviewed.       Assessment and Plan:       CKD (chronic kidney disease), stage IV  Kidney function stable since last visit. Proteinuria present. Lost to nephro f/u. Referral placed again. Discussed avoidance of NSAIDs and control of BP.  -     Ambulatory referral/consult to  Nephrology; Future; Expected date: 04/12/2024    Recurrent epistaxis  Repeat cauterization recently. Resolved epistaxis for 2 weeks but returned with daily episodes. Episode today resolved with Afrin, but does not always work.    Hyperlipidemia associated with type 2 diabetes mellitus  Well controlled on current statin.    Hypertension associated with diabetes  Elevated today and at recent visits. Likely contribution from afrin. Will add amlodipine to regimen and follow up in 2 months for repeat check. Discussed checking pressures at home.  -     amLODIPine (NORVASC) 5 MG tablet; Take 1 tablet (5 mg total) by mouth once daily.  Dispense: 90 tablet; Refill: 3    Type 2 diabetes mellitus with diabetic peripheral angiopathy without gangrene, without long-term current use of insulin  Well controlled on current regimen.    Paroxysmal atrial fibrillation  Irregular rhythm today. On anticoagulation.    Secondary hyperparathyroidism of renal origin  Ca WNL. Kidney function stable. Referral back to nephrology.      RTC in 2 months      Discussed with Dr. Langston, further recommendations as per attending addendum. Please feel free to contact me with any questions or concerns.    Yadira Sorto MD  Resident Continuity Clinic, PGY-I  [facility]  [address] [place] [address]  [phone]

## 2024-04-05 NOTE — PROGRESS NOTES
"I have personally taken the history and examined this patient and agree with the resident's note as stated above with the following thoughts:  BP (!) 172/58   Pulse 60   Ht 5' 5" (1.651 m)   Wt 75.2 kg (165 lb 12.6 oz)   SpO2 95%   BMI 27.59 kg/m²   Discussed getting vaccines up to date.  Discussed importance of low fat diet and exercise.  Will make a follow up with Nephrolgy.   Will start amoldipne 5 mg a day  for htn.  Continue iron. See back in 2 months.   "

## 2024-04-11 ENCOUNTER — LAB VISIT (OUTPATIENT)
Dept: LAB | Facility: HOSPITAL | Age: 84
End: 2024-04-11
Attending: INTERNAL MEDICINE
Payer: MEDICARE

## 2024-04-11 ENCOUNTER — ANTI-COAG VISIT (OUTPATIENT)
Dept: CARDIOLOGY | Facility: CLINIC | Age: 84
End: 2024-04-11
Payer: MEDICARE

## 2024-04-11 DIAGNOSIS — Z95.2 H/O MECHANICAL AORTIC VALVE REPLACEMENT: ICD-10-CM

## 2024-04-11 DIAGNOSIS — Z79.01 ANTICOAGULANT LONG-TERM USE: ICD-10-CM

## 2024-04-11 DIAGNOSIS — I48.0 PAROXYSMAL ATRIAL FIBRILLATION: ICD-10-CM

## 2024-04-11 LAB
INR PPP: 2.7 (ref 0.8–1.2)
PROTHROMBIN TIME: 27.7 SEC (ref 9–12.5)

## 2024-04-11 PROCEDURE — 85610 PROTHROMBIN TIME: CPT | Mod: HCNC | Performed by: INTERNAL MEDICINE

## 2024-04-11 PROCEDURE — 36415 COLL VENOUS BLD VENIPUNCTURE: CPT | Mod: HCNC | Performed by: INTERNAL MEDICINE

## 2024-04-11 PROCEDURE — 93793 ANTICOAG MGMT PT WARFARIN: CPT | Mod: S$GLB,,,

## 2024-04-11 NOTE — PROGRESS NOTES
[facility]    2024 9:46 AM    Assessment/Plan:    Patient presents today with therapeutic INR.    Recommendation for patient's warfarin regimen: Continue current maintenance dose    Recommend repeat INR in 2 weeks  _________________________________________________________________    Dariel Urbina (83 y.o.) is followed by the [facility].    Anticoagulation Summary  As of 2024      INR goal:  2.0-3.0   TTR:  68.2 % (11.7 y)   INR used for dosin.7 (2024)   Warfarin maintenance plan:  7.5 mg (5 mg x 1.5) every Thu; 5 mg (5 mg x 1) all other days   Weekly warfarin total:  37.5 mg   Plan last modified:  Laurie Patino, PharmD (2024)   Next INR check:  2024   Target end date:      Indications    Anticoagulant long-term use (Resolved) [Z79.01]  H/O mechanical aortic valve replacement (Resolved) [Z95.2]                 Anticoagulation Episode Summary       INR check location:  Clinic Lab    Preferred lab:      Send INR reminders to:  [facility] COUMADIN MONITORING POOL    Comments:  [facility] only Mon - Thu // carpel tunnel release  - target low end of range          Anticoagulation Care Providers       Provider Role Specialty Phone number    Devon Garnica MD [facility] [phone]

## 2024-04-15 ENCOUNTER — HOSPITAL ENCOUNTER (EMERGENCY)
Facility: HOSPITAL | Age: 84
Discharge: HOME | End: 2024-04-15
Attending: EMERGENCY MEDICINE
Payer: MEDICARE

## 2024-04-15 ENCOUNTER — TELEPHONE (OUTPATIENT)
Dept: INTERNAL MEDICINE | Facility: CLINIC | Age: 84
End: 2024-04-15
Payer: MEDICARE

## 2024-04-15 ENCOUNTER — TELEPHONE (OUTPATIENT)
Dept: OTOLARYNGOLOGY | Facility: CLINIC | Age: 84
End: 2024-04-15
Payer: MEDICARE

## 2024-04-15 VITALS
WEIGHT: 165 LBS | BODY MASS INDEX: 27.46 KG/M2 | RESPIRATION RATE: 16 BRPM | SYSTOLIC BLOOD PRESSURE: 173 MMHG | TEMPERATURE: 98 F | OXYGEN SATURATION: 100 % | HEART RATE: 50 BPM | DIASTOLIC BLOOD PRESSURE: 103 MMHG

## 2024-04-15 DIAGNOSIS — R04.0 RECURRENT EPISTAXIS: Primary | ICD-10-CM

## 2024-04-15 LAB
ALBUMIN SERPL BCP-MCNC: 3.2 G/DL (ref 3.5–5.2)
ALP SERPL-CCNC: 86 U/L (ref 55–135)
ALT SERPL W/O P-5'-P-CCNC: 14 U/L (ref 10–44)
ANION GAP SERPL CALC-SCNC: 6 MMOL/L (ref 8–16)
AST SERPL-CCNC: 16 U/L (ref 10–40)
BASOPHILS # BLD AUTO: 0.05 K/UL (ref 0–0.2)
BASOPHILS NFR BLD: 0.8 % (ref 0–1.9)
BILIRUB SERPL-MCNC: 0.3 MG/DL (ref 0.1–1)
BUN SERPL-MCNC: 54 MG/DL (ref 8–23)
CALCIUM SERPL-MCNC: 10.2 MG/DL (ref 8.7–10.5)
CHLORIDE SERPL-SCNC: 111 MMOL/L (ref 95–110)
CO2 SERPL-SCNC: 22 MMOL/L (ref 23–29)
CREAT SERPL-MCNC: 2 MG/DL (ref 0.5–1.4)
DIFFERENTIAL METHOD BLD: ABNORMAL
EOSINOPHIL # BLD AUTO: 0.3 K/UL (ref 0–0.5)
EOSINOPHIL NFR BLD: 4.3 % (ref 0–8)
ERYTHROCYTE [DISTWIDTH] IN BLOOD BY AUTOMATED COUNT: 14.5 % (ref 11.5–14.5)
EST. GFR  (NO RACE VARIABLE): 32.7 ML/MIN/1.73 M^2
GLUCOSE SERPL-MCNC: 120 MG/DL (ref 70–110)
HCT VFR BLD AUTO: 26.5 % (ref 40–54)
HGB BLD-MCNC: 8.1 G/DL (ref 14–18)
IMM GRANULOCYTES # BLD AUTO: 0.01 K/UL (ref 0–0.04)
IMM GRANULOCYTES NFR BLD AUTO: 0.2 % (ref 0–0.5)
INR PPP: 3.5 (ref 0.8–1.2)
LYMPHOCYTES # BLD AUTO: 1.3 K/UL (ref 1–4.8)
LYMPHOCYTES NFR BLD: 21.1 % (ref 18–48)
MCH RBC QN AUTO: 30.6 PG (ref 27–31)
MCHC RBC AUTO-ENTMCNC: 30.6 G/DL (ref 32–36)
MCV RBC AUTO: 100 FL (ref 82–98)
MONOCYTES # BLD AUTO: 0.6 K/UL (ref 0.3–1)
MONOCYTES NFR BLD: 10.2 % (ref 4–15)
NEUTROPHILS # BLD AUTO: 4 K/UL (ref 1.8–7.7)
NEUTROPHILS NFR BLD: 63.4 % (ref 38–73)
NRBC BLD-RTO: 0 /100 WBC
PLATELET # BLD AUTO: 176 K/UL (ref 150–450)
PMV BLD AUTO: 10.5 FL (ref 9.2–12.9)
POTASSIUM SERPL-SCNC: 4.9 MMOL/L (ref 3.5–5.1)
PROT SERPL-MCNC: 7.1 G/DL (ref 6–8.4)
PROTHROMBIN TIME: 35 SEC (ref 9–12.5)
RBC # BLD AUTO: 2.65 M/UL (ref 4.6–6.2)
SODIUM SERPL-SCNC: 139 MMOL/L (ref 136–145)
WBC # BLD AUTO: 6.26 K/UL (ref 3.9–12.7)

## 2024-04-15 PROCEDURE — 80053 COMPREHEN METABOLIC PANEL: CPT | Performed by: EMERGENCY MEDICINE

## 2024-04-15 PROCEDURE — 85610 PROTHROMBIN TIME: CPT | Performed by: EMERGENCY MEDICINE

## 2024-04-15 PROCEDURE — 85025 COMPLETE CBC W/AUTO DIFF WBC: CPT | Performed by: EMERGENCY MEDICINE

## 2024-04-15 PROCEDURE — 99283 EMERGENCY DEPT VISIT LOW MDM: CPT | Mod: HCNC

## 2024-04-15 NOTE — ED PROVIDER NOTES
Encounter Date: 4/15/2024       History     Chief Complaint   Patient presents with    Epistaxis     Since 5pm yesterday night, + blood thinners         84 y/o M with a history of CKD, CHF, paroxysmal afib, recurrent ED presentations for epistaxis. This began at 4am today, and he has been unable to tolerate his nasal clip as he reports a large amount of clotted blood in his posterior sinuses which caused him to gag and cough up clots. Bleeding is worsened by leaning forward. He has undergone bilateral SP ligation, bilateral embolization and cautery of the left septum in 2023 as well as AEA ligation in 2/2024. He is on coumadin for a mechanical aortic valve.         Review of patient's allergies indicates:   Allergen Reactions    Lisinopril     Losartan      Intolerance- elevates potassium level     Past Medical History:   Diagnosis Date    Anemia of chronic renal failure, stage 3 (moderate) 05/27/2015    Anticoagulant long-term use     Atherosclerosis of coronary artery bypass graft of native heart without angina pectoris 09/11/2012    3-27-18 [facility] Two vessel coronary artery disease.   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    Bilateral carotid artery disease 02/09/2017    Bleeding from the nose     Bleeding nose 03/21/2018    Cancer     Cataract     CHF (congestive heart failure)     CKD (chronic kidney disease) stage 3, GFR 30-59 ml/min 05/27/2015    Claudication of left lower extremity 09/17/2014    Colon polyp     Encounter for blood transfusion     Gastroesophageal reflux disease without esophagitis 03/19/2018    Gastrointestinal hemorrhage associated with intestinal diverticulosis 04/01/2018    Glaucoma     H/O mechanical aortic valve replacement 09/17/2014    History of gout 09/26/2012    Hyperparathyroidism due to renal insufficiency 07/27/2015    Internal hemorrhoid 04/03/2018    Long term current use of anticoagulant therapy 09/26/2012    Mechanical heart valve present      Metabolic acidosis with normal anion gap and bicarbonate losses 03/20/2018    Mixed hyperlipidemia 09/26/2012    NSTEMI (non-ST elevated myocardial infarction) 03/21/2018    Obesity, diabetes, and hypertension syndrome 02/23/2016    Paroxysmal atrial fibrillation 02/06/2023    PVD (peripheral vascular disease) 09/11/2012    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     Past Surgical History:   Procedure Laterality Date    BACK SURGERY      CARDIAC CATHETERIZATION      CARDIAC VALVE REPLACEMENT      CARDIAC VALVE SURGERY      CARPAL TUNNEL RELEASE Right 05/19/2020    Procedure: RELEASE, CARPAL TUNNEL;  Surgeon: Rupesh Norris Jr., MD;  Location: [facility];  Service: Plastics;  Laterality: Right;    CATARACT EXTRACTION Left 11/13/2022        COLON SURGERY      COLONOSCOPY N/A 03/31/2017    Procedure: COLONOSCOPY;  Surgeon: Bruno Raymond MD;  Location: [facility] (4TH FLR);  Service: Endoscopy;  Laterality: N/A;  Patient's wife requesting date.    COLONOSCOPY N/A 04/03/2018    Procedure: COLONOSCOPY;  Surgeon: Bonifacio Pelletier MD;  Location: [facility] (2ND FLR);  Service: Endoscopy;  Laterality: N/A;    COLONOSCOPY N/A 08/13/2018    Procedure: COLONOSCOPY;  Surgeon: Kam Barba MD;  Location: [facility] (2ND FLR);  Service: Endoscopy;  Laterality: N/A;  2nd floor: PA pressure 49; hx of moderate-severe valve disease     per Coumadin clinic-Patient can hold 5 days with lovenox bridge       ok to schedule per Katarina    CORONARY ANGIOGRAPHY N/A 10/04/2021    Procedure: Left heart cath +/- peripheral angiogram;  Surgeon: Jose Ruiz MD;  Location: [facility] CATH LAB;  Service: Cardiology;  Laterality: N/A;     CORONARY ANGIOGRAPHY N/A 3/2/2023    Procedure: Angiogram, Coronary;  Surgeon: Devon Garnica MD;  Location: [facility] CATH LAB;  Service: Cardiology;  Laterality: N/A;    CORONARY ANGIOPLASTY      CORONARY ARTERY BYPASS GRAFT      CORONARY BYPASS GRAFT ANGIOGRAPHY  10/04/2021    Procedure: Bypass graft study;  Surgeon: Jose Ruiz MD;  Location: [facility] CATH LAB;  Service: Cardiology;;    CORONARY BYPASS GRAFT ANGIOGRAPHY  3/2/2023    Procedure: Bypass graft study;  Surgeon: Devon Garnica MD;  Location: [facility] CATH LAB;  Service: Cardiology;;    ECHOCARDIOGRAM,TRANSESOPHAGEAL N/A 4/14/2023    Procedure: Transesophageal echo (KIRSTEN) intra-procedure log documentation;  Surgeon: [facility] Diagnostic Provider;  Location: [facility] EP LAB;  Service: Cardiology;  Laterality: N/A;    ENDOSCOPIC NASAL CAUTERIZATION Bilateral 11/3/2023    Procedure: CAUTERIZATION, NOSE, ENDOSCOPIC;  Surgeon: Jono Russell MD;  Location: [facility] OR 2ND FLR;  Service: ENT;  Laterality: Bilateral;    ENDOSCOPIC NASAL CAUTERIZATION N/A 12/18/2023    Procedure: CAUTERIZATION, NOSE, ENDOSCOPIC;  Surgeon: Jono Russell MD;  Location: [facility] OR 2ND FLR;  Service: ENT;  Laterality: N/A;    EYE SURGERY      FESS, WITH IMAGING GUIDANCE Left 2/28/2024    Procedure: FESS, WITH IMAGING GUIDANCE;  Surgeon: Jono Russell MD;  Location: [facility] OR 2ND FLR;  Service: ENT;  Laterality: Left;  Scan loaded ENT [employer]. CL    FUNCTIONAL ENDOSCOPIC SINUS SURGERY (FESS) Bilateral 6/14/2023    Procedure: FESS (FUNCTIONAL ENDOSCOPIC SINUS SURGERY);  Surgeon: Jono Russell MD;  Location: [facility] OR 2ND FLR;  Service: ENT;  Laterality: Bilateral;    HERNIA REPAIR      INTRAOCULAR PROSTHESES INSERTION Left 11/13/2022    Procedure: INSERTION, IOL PROSTHESIS;  Surgeon: Alia Mckeon MD;  Location: [facility] OR 1ST FLR;  Service: Ophthalmology;  Laterality: Left;    INTRAOCULAR PROSTHESES INSERTION Right 12/04/2022    Procedure: INSERTION, IOL PROSTHESIS;  Surgeon: Alia BANG  MD Mike;  [facility];  Service: Ophthalmology;  Laterality: Right;    LIGATION, ARTERY, ETHMOIDAL Left 2/28/2024    Procedure: LIGATION, ARTERY, ETHMOIDAL;  Surgeon: Jono Russell MD;  Location: [facility] OR 2ND FLR;  Service: ENT;  Laterality: Left;    LIGATION, ARTERY, SPHENOPALATINE, ENDOSCOPIC Bilateral 6/14/2023    Procedure: LIGATION, ARTERY, SPHENOPALATINE, ENDOSCOPIC;  Surgeon: Jono Russell MD;  [facility];  Service: ENT;  Laterality: Bilateral;    PHACOEMULSIFICATION OF CATARACT Left 11/13/2022    Procedure: PHACOEMULSIFICATION, CATARACT;  Surgeon: Alia Mckeon MD;  Location: [facility] OR [address] [place] [address];  Service: Ophthalmology;  Laterality: Left;    PHACOEMULSIFICATION OF CATARACT Right 12/04/2022    Procedure: PHACOEMULSIFICATION, CATARACT;  Surgeon: Alia Mckeon MD;  Location: [address];  Service: Ophthalmology;  Laterality: Right;    SPINE SURGERY      TRANSESOPHAGEAL ECHOCARDIOGRAPHY N/A 3/22/2023    Procedure: ECHOCARDIOGRAM, TRANSESOPHAGEAL;  Surgeon: [facility] Diagnostic Provider;  Location: [facility] EP LAB;  Service: Anesthesiology;  Laterality: N/A;    TRANSESOPHAGEAL ECHOCARDIOGRAPHY N/A 4/11/2023    Procedure: ECHOCARDIOGRAM, TRANSESOPHAGEAL;  Surgeon: [facility] Diagnostic Provider;  Location: [facility] EP LAB;  Service: Anesthesiology;  Laterality: N/A;    VASECTOMY       Family History   Problem Relation Name Age of Onset    Heart failure Mother      Heart disease Mother      Heart failure Father      Heart disease Father      Alcohol abuse Father      Heart failure Brother      Heart disease Brother      Diabetes Brother      No Known Problems Sister      No Known Problems Maternal Grandmother      No Known Problems Maternal Grandfather      No Known Problems Paternal Grandmother      No Known Problems Paternal Grandfather      Heart disease Sister      No Known Problems Maternal Aunt      No Known Problems Maternal Uncle      No Known Problems Paternal Aunt      No  Known Problems Paternal Uncle      Amblyopia Neg Hx      Blindness Neg Hx      Cancer Neg Hx      Cataracts Neg Hx      Glaucoma Neg Hx      Hypertension Neg Hx      Macular degeneration Neg Hx      Retinal detachment Neg Hx      Strabismus Neg Hx      Stroke Neg Hx      Thyroid disease Neg Hx      Anemia Neg Hx      Arrhythmia Neg Hx      Asthma Neg Hx      Clotting disorder Neg Hx      Fainting Neg Hx      Heart attack Neg Hx      Hyperlipidemia Neg Hx      Atrial Septal Defect Neg Hx      Melanoma Neg Hx       Social History     Tobacco Use    Smoking status: Former     Current packs/day: 0.00     Average packs/day: 1 pack/day for 20.0 years (20.0 ttl pk-yrs)     Types: Cigarettes     Start date: 1960     Quit date: 1980     Years since quittin.6     Passive exposure: Never    Smokeless tobacco: Never   Substance Use Topics    Alcohol use: No    Drug use: No     Review of Systems   HENT:  Positive for nosebleeds.        Physical Exam     Initial Vitals [04/15/24 1017]   BP Pulse Resp Temp SpO2   (!) 163/69 (!) 54 17 97.9 °F (36.6 °C) 99 %      MAP       --         Physical Exam    Constitutional: Vital signs are normal. He appears well-nourished.   HENT:   Head: Normocephalic and atraumatic.   Mouth/Throat: Oropharynx is clear and moist.   Dried blood in BL Nares, no active bleeding, normal airway and phonation   Eyes: Conjunctivae and EOM are normal.   Neck: Neck supple. No thyroid mass present.   Normal range of motion.  Cardiovascular:  Normal rate, regular rhythm and normal heart sounds.     Exam reveals no gallop and no friction rub.       No murmur heard.  Pulmonary/Chest: Breath sounds normal. He has no wheezes. He has no rhonchi. He has no rales.   Abdominal: Abdomen is soft. Bowel sounds are normal. There is no abdominal tenderness.   Musculoskeletal:         General: Normal range of motion.      Cervical back: Normal range of motion and neck supple.     Neurological: He is alert and  oriented to person, place, and time. He has normal strength. No cranial nerve deficit or sensory deficit.   Skin: Skin is warm and dry. No rash noted. No erythema.   Psychiatric: He has a normal mood and affect. His speech is normal. Cognition and memory are normal.         ED Course   Procedures  Labs Reviewed   CBC W/ AUTO DIFFERENTIAL - Abnormal; Notable for the following components:       Result Value    RBC 2.65 (*)     Hemoglobin 8.1 (*)     Hematocrit 26.5 (*)      (*)     MCHC 30.6 (*)     All other components within normal limits   COMPREHENSIVE METABOLIC PANEL - Abnormal; Notable for the following components:    Chloride 111 (*)     CO2 22 (*)     Glucose 120 (*)     BUN 54 (*)     Creatinine 2.0 (*)     Albumin 3.2 (*)     eGFR 32.7 (*)     Anion Gap 6 (*)     All other components within normal limits   PROTIME-INR - Abnormal; Notable for the following components:    Prothrombin Time 35.0 (*)     INR 3.5 (*)     All other components within normal limits          Imaging Results    None          Medications - No data to display  Medical Decision Making  Pt rapidly assessed. Normal airway with no bleeding while in the waiting and during the entirety of ED observation. Stable H/H. INR 3.5, pt asked to abstain from next dose of coumadin. Further educated about supp care and asked to f/u with ENT asap. Asked to return to the ED immed for any new, worsening or concerning symptoms. Pt and wife agreeable and he was dc'd in stable condition.    Amount and/or Complexity of Data Reviewed  Labs: ordered.                                      Clinical Impression:  Final diagnoses:  [R04.0] Recurrent epistaxis (Primary)          ED Disposition Condition    Discharge Stable          ED Prescriptions    None       Follow-up Information       Follow up With Specialties Details Why Contact Info    Devon Langston Jr., MD Internal Medicine Schedule an appointment as soon as possible for a visit   [address]  [facility] [address]  [phone]      Jono Russell MD Otolaryngology Schedule an appointment as soon as possible for a visit   [address] [facility] [address]  [phone]               Papa Croft MD  04/16/24 1034

## 2024-04-15 NOTE — TELEPHONE ENCOUNTER
----- Message from Chandrika Frye sent at 4/15/2024  3:45 PM CDT -----  Contact: Wife Ameena  [phone]  Would like to receive medical advice.    Would they like a call back or a response via [facility]:  call back    Additional information:  Calling to confirm if pt was suppose to stop a medication before taking amLODIPine (NORVASC) 5 MG tablet.

## 2024-04-15 NOTE — DISCHARGE INSTRUCTIONS
You are being discharged from the hospital and will continued to be cared for by the [facility] team.  We will see you in your home tomorrow before 2P.  Call [phone] for any needs relating to your care 24 hours a day, 7 days a week.

## 2024-04-15 NOTE — PLAN OF CARE
[facility] Notice     This is a general notification that this patient was enrolled in the [facility] program - an admission alternative service for patients presenting to [facility].        This patient was stable for discharge and will be care for by our home based team over the next 15 days.       The patient will have several services available to them over the time with the program including in home visits with virtual provider (MD,KAILA) in tandem with an in-person paramedic, virtual nursing, and virtual care management.   The service will be able to manage conditions in the home setting and transition them back to PCP on conclusion.       If you have any questions in regards to patient care during this episode of care call our clinical line, monitored 24/7, at [phone].      If you have any questions in regards to the program, do not hesitate to reach our to Dr. Chris Taylor, medical director, via secure chat.

## 2024-04-15 NOTE — PLAN OF CARE
Discharge Planning Assessment:  Patient admitted on: 4-15-24  Chart reviewed, Care plan discussed with treatment team    Current Dispo: home today  Agreed with [facility], all questions answered  Transportation: has reliable  Case management to follow as needed

## 2024-04-16 ENCOUNTER — PATIENT OUTREACH (OUTPATIENT)
Dept: EMERGENCY MEDICINE | Facility: HOSPITAL | Age: 84
End: 2024-04-16
Payer: MEDICARE

## 2024-04-16 ENCOUNTER — TELEPHONE (OUTPATIENT)
Dept: INTERNAL MEDICINE | Facility: CLINIC | Age: 84
End: 2024-04-16
Payer: MEDICARE

## 2024-04-16 NOTE — PROGRESS NOTES
Call placed per ED Navigator to f/u from last encounter. No answer. ED navigator will follow-up with patient periodically.

## 2024-04-16 NOTE — TELEPHONE ENCOUNTER
Looked at my note from last visit-- We started amlodipine and did not stop any medications. - so this is an add on medication

## 2024-04-16 NOTE — TELEPHONE ENCOUNTER
Informed Mrs. Urbina that pt wasn't instructed to stop any medication prior to starting amlodipine per Dr. Langston. She verbalized understanding.    Lory GALLAGHER

## 2024-04-17 NOTE — PROGRESS NOTES
4/17/24 Wife called to report Patient was in the ED 4/15 with nose bleed, 4/16 had blood work drawn by [facility] [phone] and INR was 3.7, Patient held 4/15/ & 4/16,  to draw another INR today, 4/16 started Amlodipine 5mg daily, next INR for coumadin clinic is due 4/24

## 2024-04-24 ENCOUNTER — OFFICE VISIT (OUTPATIENT)
Dept: OTOLARYNGOLOGY | Facility: CLINIC | Age: 84
End: 2024-04-24
Payer: MEDICARE

## 2024-04-24 ENCOUNTER — ANTI-COAG VISIT (OUTPATIENT)
Dept: CARDIOLOGY | Facility: CLINIC | Age: 84
End: 2024-04-24
Payer: MEDICARE

## 2024-04-24 ENCOUNTER — LAB VISIT (OUTPATIENT)
Dept: LAB | Facility: HOSPITAL | Age: 84
End: 2024-04-24
Attending: INTERNAL MEDICINE
Payer: MEDICARE

## 2024-04-24 DIAGNOSIS — I48.0 PAROXYSMAL ATRIAL FIBRILLATION: ICD-10-CM

## 2024-04-24 DIAGNOSIS — R04.0 EPISTAXIS: Primary | ICD-10-CM

## 2024-04-24 DIAGNOSIS — J34.89 NASAL SEPTAL PERFORATION: ICD-10-CM

## 2024-04-24 DIAGNOSIS — Z95.2 H/O MECHANICAL AORTIC VALVE REPLACEMENT: ICD-10-CM

## 2024-04-24 DIAGNOSIS — Z79.01 ANTICOAGULANT LONG-TERM USE: Primary | ICD-10-CM

## 2024-04-24 DIAGNOSIS — Z79.01 ANTICOAGULANT LONG-TERM USE: ICD-10-CM

## 2024-04-24 DIAGNOSIS — N18.32 STAGE 3B CHRONIC KIDNEY DISEASE: ICD-10-CM

## 2024-04-24 LAB
INR PPP: 2.6 (ref 0.8–1.2)
PROTHROMBIN TIME: 26.6 SEC (ref 9–12.5)

## 2024-04-24 PROCEDURE — 93793 ANTICOAG MGMT PT WARFARIN: CPT | Mod: S$GLB,,,

## 2024-04-24 PROCEDURE — 31238 NSL/SINS NDSC SRG NSL HEMRRG: CPT | Mod: 79,HCNC,LT,S$GLB | Performed by: STUDENT IN AN ORGANIZED HEALTH CARE EDUCATION/TRAINING PROGRAM

## 2024-04-24 PROCEDURE — 99999 PR PBB SHADOW E&M-EST. PATIENT-LVL II: CPT | Mod: PBBFAC,HCNC,, | Performed by: STUDENT IN AN ORGANIZED HEALTH CARE EDUCATION/TRAINING PROGRAM

## 2024-04-24 PROCEDURE — 85610 PROTHROMBIN TIME: CPT | Mod: HCNC | Performed by: INTERNAL MEDICINE

## 2024-04-24 PROCEDURE — 1160F RVW MEDS BY RX/DR IN RCRD: CPT | Mod: HCNC,CPTII,S$GLB, | Performed by: STUDENT IN AN ORGANIZED HEALTH CARE EDUCATION/TRAINING PROGRAM

## 2024-04-24 PROCEDURE — 1159F MED LIST DOCD IN RCRD: CPT | Mod: HCNC,CPTII,S$GLB, | Performed by: STUDENT IN AN ORGANIZED HEALTH CARE EDUCATION/TRAINING PROGRAM

## 2024-04-24 PROCEDURE — 99024 POSTOP FOLLOW-UP VISIT: CPT | Mod: HCNC,S$GLB,, | Performed by: STUDENT IN AN ORGANIZED HEALTH CARE EDUCATION/TRAINING PROGRAM

## 2024-04-24 PROCEDURE — 36415 COLL VENOUS BLD VENIPUNCTURE: CPT | Mod: HCNC | Performed by: INTERNAL MEDICINE

## 2024-04-24 RX ORDER — WARFARIN SODIUM 5 MG/1
TABLET ORAL
Qty: 90 TABLET | Refills: 3 | Status: SHIPPED | OUTPATIENT
Start: 2024-04-24

## 2024-04-24 RX ORDER — ROSUVASTATIN CALCIUM 40 MG/1
TABLET, COATED ORAL
Qty: 90 TABLET | Refills: 3 | Status: SHIPPED | OUTPATIENT
Start: 2024-04-24

## 2024-04-24 NOTE — PROGRESS NOTES
Subjective:      Dariel is a 83 y.o. male who comes for follow-up of  epistaxis .  His last visit with me was on 3/19/2024.  He previously undergone bilateral SP ligation followed by bilateral embolization, cautery on the left septum for persistent bleeding on 11/03/2023 and again recently on 12/18/23. He recently underwent left AEA ligation on 2/28/24.     Was seen recently in the ED for left epistaxis. INR @ 3.5, has INR pending today. Still having daily episodes of bleeding, not large amounts per patient and wife. Last episodes was yesterday and was left sided.     His current sinus regime consists of: Saline rinses.     The patient's medications, allergies, past medical, surgical, social and family histories were reviewed and updated as appropriate.    ROS     A detailed review of systems was obtained with pertinent positives as per the above HPI, and otherwise negative.        Objective:     There were no vitals taken for this visit.       Constitutional:   Vital signs are normal. He appears well-developed and well-nourished.     Head:  Normocephalic and atraumatic.     Ears:  Hearing normal to normal and whispered voice; external ear normal without scars, lesions, or masses; ear canal, tympanic membrane, and middle ear normal..   Right Ear: No swelling. Tympanic membrane is not perforated and not bulging. No middle ear effusion.   Left Ear: No swelling. Tympanic membrane is not perforated and not bulging.  No middle ear effusion.     Nose:  Nose normal including turbinates, nasal mucosa, sinuses and nasal septum. No epistaxis.     Mouth/Throat  Oropharynx clear and moist without lesions or asymmetry and normal uvula midline. Normal dentition. No tonsillar abscesses. Tonsillar exudate.      Neck:  Neck normal without thyromegaly masses, asymmetry, normal tracheal structure, crepitus, and tenderness, thyroid normal, trachea normal, phonation normal, full range of motion with neck supple and no adenopathy.  "No stridor present.        Head (right side): No submental adenopathy present.        Head (left side): No submental adenopathy present.     He has no cervical adenopathy.     Pulmonary/Chest:   No stridor.       Procedure    Nasal Endoscopy with Control of Epistaxis    After written consent was obtained the nose was anesthetized with topical pontocaine and phenylephrine pledgets.  silver nitrate was used to cauterize the prominent vascularity on the left .  The patient tolerated the procedure well and there were no complications.  Hemostasis was obtained.  Bacitracin ointment was placed after cauterization.    Old blood products on left side removed. Oozing noted from left septum that was cauterized, stable appearing septal perforation. Posicept was placed in the left nasal cavity.     Data Reviewed    WBC (K/uL)   Date Value   04/15/2024 6.26     Eosinophil % (%)   Date Value   04/15/2024 4.3     Eos # (K/uL)   Date Value   04/15/2024 0.3     Platelets (K/uL)   Date Value   04/15/2024 176     Glucose (mg/dL)   Date Value   04/15/2024 120 (H)     No results found for: "IGE"    No sinus imaging available.      Assessment:     1. Epistaxis    2. Nasal septal perforation    3. Stage 3b chronic kidney disease    4. Anticoagulant long-term use         Plan:     Do not blow nose for 1 week.  Sneeze with mouth open.  Do not take ibuprofen or aspirin for 1 week.  Place saline nasal gel in nostrils at bedtime.  May use saline nose drops during the day to prevent dryness.  If bleeding recurs, use oxymetazoline (Afrin) spray in the nostril and call for follow-up appointment.    Jono Russell MD     "

## 2024-04-24 NOTE — PROGRESS NOTES
[facility]    2024 1:53 PM    Assessment/Plan:    Patient presents today with therapeutic INR.    Assessment of patient findings and chart review: Reviewed    Recommendation for patient's warfarin regimen: Decrease maintenance dose    Recommend repeat INR in 2 weeks  _________________________________________________________________    Dariel Devon Urbina (83 y.o.) is followed by the [facility].    Anticoagulation Summary  As of 2024      INR goal:  2.0-3.0   TTR:  68.1% (11.7 y)   INR used for dosin.6 (2024)   Warfarin maintenance plan:  5 mg (5 mg x 1) every day   Weekly warfarin total:  35 mg   Plan last modified:  Laurie Patino, PharmD (2024)   Next INR check:  2024   Target end date:      Indications    Anticoagulant long-term use (Resolved) [Z79.01]  H/O mechanical aortic valve replacement (Resolved) [Z95.2]                 Anticoagulation Episode Summary       INR check location:  Clinic Lab    Preferred lab:      Send INR reminders to:  [facility] COUMADIN MONITORING POOL    Comments:  [facility] IM - Internal Med Clinic only Mon - Thu // carpel tunnel release  - target low end of range          Anticoagulation Care Providers       Provider Role Specialty Phone number    Devon Garnica MD [facility] Cardiology [phone]            Patient Findings       Positives:  Signs/symptoms of bleeding    Negatives:  Signs/symptoms of thrombosis, Laboratory test error suspected, Change in health, Change in alcohol use, Change in activity, Upcoming invasive procedure, Emergency department visit, Upcoming dental procedure, Missed doses, Extra doses, Change in medications, Change in diet/appetite, Hospital admission, Bruising, Other complaints    Comments:  24 pt bleeds a little bit every morning from left nostril and it does stops. Pt was advised on Thurs to only take 5 mg dose to the the bleeding. No  other changes were reported by Ameena.

## 2024-04-26 ENCOUNTER — LAB VISIT (OUTPATIENT)
Dept: LAB | Facility: HOSPITAL | Age: 84
End: 2024-04-26
Payer: MEDICARE

## 2024-04-26 DIAGNOSIS — D62 ACUTE BLOOD LOSS ANEMIA: ICD-10-CM

## 2024-04-26 LAB
BASOPHILS # BLD AUTO: 0.04 K/UL (ref 0–0.2)
BASOPHILS NFR BLD: 0.6 % (ref 0–1.9)
DIFFERENTIAL METHOD BLD: ABNORMAL
EOSINOPHIL # BLD AUTO: 0.2 K/UL (ref 0–0.5)
EOSINOPHIL NFR BLD: 3.2 % (ref 0–8)
ERYTHROCYTE [DISTWIDTH] IN BLOOD BY AUTOMATED COUNT: 14.7 % (ref 11.5–14.5)
HCT VFR BLD AUTO: 25.4 % (ref 40–54)
HGB BLD-MCNC: 8 G/DL (ref 14–18)
IMM GRANULOCYTES # BLD AUTO: 0.03 K/UL (ref 0–0.04)
IMM GRANULOCYTES NFR BLD AUTO: 0.5 % (ref 0–0.5)
LYMPHOCYTES # BLD AUTO: 1.3 K/UL (ref 1–4.8)
LYMPHOCYTES NFR BLD: 21.2 % (ref 18–48)
MCH RBC QN AUTO: 31 PG (ref 27–31)
MCHC RBC AUTO-ENTMCNC: 31.5 G/DL (ref 32–36)
MCV RBC AUTO: 98 FL (ref 82–98)
MONOCYTES # BLD AUTO: 0.6 K/UL (ref 0.3–1)
MONOCYTES NFR BLD: 9.2 % (ref 4–15)
NEUTROPHILS # BLD AUTO: 4 K/UL (ref 1.8–7.7)
NEUTROPHILS NFR BLD: 65.3 % (ref 38–73)
NRBC BLD-RTO: 0 /100 WBC
PLATELET # BLD AUTO: 189 K/UL (ref 150–450)
PMV BLD AUTO: 9.8 FL (ref 9.2–12.9)
RBC # BLD AUTO: 2.58 M/UL (ref 4.6–6.2)
WBC # BLD AUTO: 6.19 K/UL (ref 3.9–12.7)

## 2024-04-26 PROCEDURE — 36415 COLL VENOUS BLD VENIPUNCTURE: CPT | Mod: HCNC | Performed by: INTERNAL MEDICINE

## 2024-04-26 PROCEDURE — 85025 COMPLETE CBC W/AUTO DIFF WBC: CPT | Mod: HCNC | Performed by: INTERNAL MEDICINE

## 2024-04-29 DIAGNOSIS — R04.0 RECURRENT EPISTAXIS: ICD-10-CM

## 2024-04-29 RX ORDER — FERROUS GLUCONATE 324(38)MG
TABLET ORAL
Qty: 90 TABLET | Refills: 3 | Status: SHIPPED | OUTPATIENT
Start: 2024-04-29

## 2024-05-08 ENCOUNTER — LAB VISIT (OUTPATIENT)
Dept: LAB | Facility: HOSPITAL | Age: 84
End: 2024-05-08
Attending: INTERNAL MEDICINE
Payer: MEDICARE

## 2024-05-08 ENCOUNTER — ANTI-COAG VISIT (OUTPATIENT)
Dept: CARDIOLOGY | Facility: CLINIC | Age: 84
End: 2024-05-08
Payer: MEDICARE

## 2024-05-08 DIAGNOSIS — Z95.2 H/O MECHANICAL AORTIC VALVE REPLACEMENT: ICD-10-CM

## 2024-05-08 DIAGNOSIS — Z79.01 ANTICOAGULANT LONG-TERM USE: ICD-10-CM

## 2024-05-08 LAB
INR PPP: 2.2 (ref 0.8–1.2)
PROTHROMBIN TIME: 22.5 SEC (ref 9–12.5)

## 2024-05-08 PROCEDURE — 85610 PROTHROMBIN TIME: CPT | Mod: HCNC | Performed by: INTERNAL MEDICINE

## 2024-05-08 PROCEDURE — 36415 COLL VENOUS BLD VENIPUNCTURE: CPT | Mod: HCNC | Performed by: INTERNAL MEDICINE

## 2024-05-08 PROCEDURE — 93793 ANTICOAG MGMT PT WARFARIN: CPT | Mod: S$GLB,,,

## 2024-05-08 NOTE — PROGRESS NOTES
[facility]    2024 10:35 AM    Assessment/Plan:    Patient presents today with therapeutic INR.    Recommendation for patient's warfarin regimen: Continue current maintenance dose    Recommend repeat INR in 3 weeks  _________________________________________________________________    Dariel Urbina (83 y.o.) is followed by the [facility].    Anticoagulation Summary  As of 2024      INR goal:  2.0-3.0   TTR:  68.2% (11.7 y)   INR used for dosin.2 (2024)   Warfarin maintenance plan:  5 mg (5 mg x 1) every day   Weekly warfarin total:  35 mg   Plan last modified:  Laurie Patino, PharmD (2024)   Next INR check:  2024   Target end date:      Indications    Anticoagulant long-term use (Resolved) [Z79.01]  H/O mechanical aortic valve replacement (Resolved) [Z95.2]                 Anticoagulation Episode Summary       INR check location:  Clinic Lab    Preferred lab:      Send INR reminders to:  [facility] COUMADIN MONITORING POOL    Comments:  [facility] - Internal Med Clinic only Mon - Thu // carpel tunnel release  - target low end of range          Anticoagulation Care Providers       Provider Role Specialty Phone number    Devon Garnica MD [facility] Cardiology [phone]

## 2024-05-15 RX ORDER — BUMETANIDE 1 MG/1
1 TABLET ORAL EVERY OTHER DAY
Qty: 45 TABLET | Refills: 3 | Status: ON HOLD | OUTPATIENT
Start: 2024-05-15 | End: 2024-06-03

## 2024-05-15 NOTE — TELEPHONE ENCOUNTER
----- Message from Dany Kowalskihop sent at 5/15/2024  9:40 AM CDT -----  Regarding: Refill Needed for Ankle Swelling  Contact: Ameena [phone]  Requesting an RX refill or new RX.  Is this a refill or new RX: Refill  RX name and strength (copy/paste from chart): bumetanide (BUMEX) 1 MG tablet    Is this a 30 day or 90 day RX: 45  Pharmacy name and phone # (copy/paste from chart):    [facility] - [place], [place] - [address]  [address] [address]  Phone: [phone] Fax: [phone]    Comments: Request more due to ankles swelling more often

## 2024-05-15 NOTE — TELEPHONE ENCOUNTER
Care Due:                  Date            Visit Type   Department     Provider  --------------------------------------------------------------------------------                                EP -                              PRIMARY      [facility] INTERNAL  Last Visit: 04-      CARE ([facility])   JOSHUA Langston                              [facility]                              PRIMARY      [facility] INTERNAL  Next Visit: 06-      CARE ([facility])   [facility]       Devon Langston                                                            Last  Test          Frequency    Reason                     Performed    Due Date  --------------------------------------------------------------------------------    Lipid Panel.  12 months..  rosuvastatin.............  02- 02-    Uric Acid...  12 months..  allopurinoL..............  Not Found    Overdue    [employer] Embedded Care Due Messages. Reference number: [id number].   5/15/2024 10:06:04 AM CDT

## 2024-05-18 NOTE — TELEPHONE ENCOUNTER
No care due was identified.  [facility] Embedded Care Due Messages. Reference number: [id number].   5/18/2024 3:00:50 AM CDT

## 2024-05-20 RX ORDER — ISOSORBIDE MONONITRATE 60 MG/1
60 TABLET, EXTENDED RELEASE ORAL NIGHTLY
Qty: 90 TABLET | Refills: 3 | Status: ON HOLD | OUTPATIENT
Start: 2024-05-20 | End: 2024-06-19 | Stop reason: HOSPADM

## 2024-05-21 ENCOUNTER — OFFICE VISIT (OUTPATIENT)
Dept: OTOLARYNGOLOGY | Facility: CLINIC | Age: 84
End: 2024-05-21
Payer: MEDICARE

## 2024-05-21 VITALS — DIASTOLIC BLOOD PRESSURE: 57 MMHG | SYSTOLIC BLOOD PRESSURE: 147 MMHG | HEART RATE: 60 BPM

## 2024-05-21 DIAGNOSIS — J34.3 HYPERTROPHY OF BOTH INFERIOR NASAL TURBINATES: ICD-10-CM

## 2024-05-21 DIAGNOSIS — J34.89 NASAL SEPTAL PERFORATION: ICD-10-CM

## 2024-05-21 DIAGNOSIS — R04.0 EPISTAXIS: Primary | ICD-10-CM

## 2024-05-21 PROCEDURE — 3078F DIAST BP <80 MM HG: CPT | Mod: HCNC,CPTII,S$GLB, | Performed by: STUDENT IN AN ORGANIZED HEALTH CARE EDUCATION/TRAINING PROGRAM

## 2024-05-21 PROCEDURE — 99999 PR PBB SHADOW E&M-EST. PATIENT-LVL II: CPT | Mod: PBBFAC,HCNC,, | Performed by: STUDENT IN AN ORGANIZED HEALTH CARE EDUCATION/TRAINING PROGRAM

## 2024-05-21 PROCEDURE — 99024 POSTOP FOLLOW-UP VISIT: CPT | Mod: HCNC,S$GLB,, | Performed by: STUDENT IN AN ORGANIZED HEALTH CARE EDUCATION/TRAINING PROGRAM

## 2024-05-21 PROCEDURE — 1160F RVW MEDS BY RX/DR IN RCRD: CPT | Mod: HCNC,CPTII,S$GLB, | Performed by: STUDENT IN AN ORGANIZED HEALTH CARE EDUCATION/TRAINING PROGRAM

## 2024-05-21 PROCEDURE — 1159F MED LIST DOCD IN RCRD: CPT | Mod: HCNC,CPTII,S$GLB, | Performed by: STUDENT IN AN ORGANIZED HEALTH CARE EDUCATION/TRAINING PROGRAM

## 2024-05-21 PROCEDURE — 31238 NSL/SINS NDSC SRG NSL HEMRRG: CPT | Mod: 79,HCNC,LT,S$GLB | Performed by: STUDENT IN AN ORGANIZED HEALTH CARE EDUCATION/TRAINING PROGRAM

## 2024-05-21 PROCEDURE — 1126F AMNT PAIN NOTED NONE PRSNT: CPT | Mod: HCNC,CPTII,S$GLB, | Performed by: STUDENT IN AN ORGANIZED HEALTH CARE EDUCATION/TRAINING PROGRAM

## 2024-05-21 PROCEDURE — 3077F SYST BP >= 140 MM HG: CPT | Mod: HCNC,CPTII,S$GLB, | Performed by: STUDENT IN AN ORGANIZED HEALTH CARE EDUCATION/TRAINING PROGRAM

## 2024-05-21 NOTE — PROGRESS NOTES
Subjective:      Dariel is a 83 y.o. male who comes for follow-up of  epistaxis .  His last visit with me was on 4/24/2024.  He previously undergone bilateral SP ligation followed by bilateral embolization, cautery on the left septum for persistent bleeding on 11/03/2023 and again recently on 12/18/23. He recently underwent left AEA ligation on 2/28/24.     Reports continued left bleeding. After last visit had 2 days of no bleeding. Again started with left sided epistaxis daily, he able to stop on its own. Lat INR 2.2.     His current sinus regime consists of: Saline rinses.     The patient's medications, allergies, past medical, surgical, social and family histories were reviewed and updated as appropriate.    ROS     A detailed review of systems was obtained with pertinent positives as per the above HPI, and otherwise negative.        Objective:     BP (!) 147/57 (BP Location: Left arm, Patient Position: Sitting, BP Method: Small (Automatic))   Pulse 60        Constitutional:   Vital signs are normal. He appears well-developed and well-nourished.     Head:  Normocephalic and atraumatic.     Ears:  Hearing normal to normal and whispered voice; external ear normal without scars, lesions, or masses; ear canal, tympanic membrane, and middle ear normal..   Right Ear: No swelling. Tympanic membrane is not perforated and not bulging. No middle ear effusion.   Left Ear: No swelling. Tympanic membrane is not perforated and not bulging.  No middle ear effusion.     Nose:  Nose normal including turbinates, nasal mucosa, sinuses and nasal septum. No epistaxis.     Mouth/Throat  Oropharynx clear and moist without lesions or asymmetry and normal uvula midline. Normal dentition. No tonsillar abscesses. Tonsillar exudate.      Neck:  Neck normal without thyromegaly masses, asymmetry, normal tracheal structure, crepitus, and tenderness, thyroid normal, trachea normal, phonation normal, full range of motion with neck supple  "and no adenopathy. No stridor present.        Head (right side): No submental adenopathy present.        Head (left side): No submental adenopathy present.     He has no cervical adenopathy.     Pulmonary/Chest:   No stridor.       Procedure    Nasal Endoscopy with Control of Epistaxis    After written consent was obtained the nose was anesthetized with topical pontocaine and phenylephrine pledgets.  silver nitrate was used to cauterize the prominent vascularity on the left .  The patient tolerated the procedure well and there were no complications.  Hemostasis was obtained.  Bacitracin ointment was placed after cauterization.    Old blood products on left side removed. Oozing noted from left lateral nasal wall that was cauterized, stable appearing septal perforation. Posicept was placed in the left nasal cavity.     Data Reviewed    WBC (K/uL)   Date Value   04/26/2024 6.19     Eosinophil % (%)   Date Value   04/26/2024 3.2     Eos # (K/uL)   Date Value   04/26/2024 0.2     Platelets (K/uL)   Date Value   04/26/2024 189     Glucose (mg/dL)   Date Value   04/15/2024 120 (H)     No results found for: "IGE"    No sinus imaging available.    Assessment:     1. Epistaxis    2. Nasal septal perforation    3. Hypertrophy of both inferior nasal turbinates      Plan:     Do not blow nose for 1 week.  Sneeze with mouth open.  Do not take ibuprofen or aspirin for 1 week.  Place saline nasal gel in nostrils at bedtime.  May use saline nose drops during the day to prevent dryness.  If bleeding recurs, use oxymetazoline (Afrin) spray in the nostril and call for follow-up appointment.    Jono Russell MD     "

## 2024-05-29 ENCOUNTER — HOSPITAL ENCOUNTER (INPATIENT)
Facility: HOSPITAL | Age: 84
LOS: 5 days | Discharge: HOME | DRG: 811 | End: 2024-06-03
Attending: STUDENT IN AN ORGANIZED HEALTH CARE EDUCATION/TRAINING PROGRAM | Admitting: HOSPITALIST
Payer: MEDICARE

## 2024-05-29 ENCOUNTER — TELEPHONE (OUTPATIENT)
Dept: INTERNAL MEDICINE | Facility: CLINIC | Age: 84
End: 2024-05-29

## 2024-05-29 ENCOUNTER — NURSE TRIAGE (OUTPATIENT)
Dept: ADMINISTRATIVE | Facility: CLINIC | Age: 84
End: 2024-05-29
Payer: MEDICARE

## 2024-05-29 ENCOUNTER — ANTI-COAG VISIT (OUTPATIENT)
Dept: CARDIOLOGY | Facility: CLINIC | Age: 84
End: 2024-05-29
Payer: MEDICARE

## 2024-05-29 ENCOUNTER — TELEPHONE (OUTPATIENT)
Dept: INTERNAL MEDICINE | Facility: CLINIC | Age: 84
End: 2024-05-29
Payer: MEDICARE

## 2024-05-29 DIAGNOSIS — R07.9 CHEST PAIN: ICD-10-CM

## 2024-05-29 DIAGNOSIS — E78.5 HYPERLIPIDEMIA ASSOCIATED WITH TYPE 2 DIABETES MELLITUS: Primary | ICD-10-CM

## 2024-05-29 DIAGNOSIS — I50.9 ACUTE EXACERBATION OF CHF (CONGESTIVE HEART FAILURE): ICD-10-CM

## 2024-05-29 DIAGNOSIS — Z79.01 LONG TERM (CURRENT) USE OF ANTICOAGULANTS: Primary | ICD-10-CM

## 2024-05-29 DIAGNOSIS — E11.51 TYPE 2 DIABETES MELLITUS WITH DIABETIC PERIPHERAL ANGIOPATHY WITHOUT GANGRENE, WITH LONG-TERM CURRENT USE OF INSULIN: ICD-10-CM

## 2024-05-29 DIAGNOSIS — R07.89 CHEST DISCOMFORT: ICD-10-CM

## 2024-05-29 DIAGNOSIS — E11.69 HYPERLIPIDEMIA ASSOCIATED WITH TYPE 2 DIABETES MELLITUS: Primary | ICD-10-CM

## 2024-05-29 DIAGNOSIS — D62 ACUTE BLOOD LOSS ANEMIA: ICD-10-CM

## 2024-05-29 DIAGNOSIS — D64.9 ANEMIA: Primary | ICD-10-CM

## 2024-05-29 DIAGNOSIS — Z79.4 TYPE 2 DIABETES MELLITUS WITH DIABETIC PERIPHERAL ANGIOPATHY WITHOUT GANGRENE, WITH LONG-TERM CURRENT USE OF INSULIN: ICD-10-CM

## 2024-05-29 DIAGNOSIS — R04.0 RECURRENT EPISTAXIS: ICD-10-CM

## 2024-05-29 DIAGNOSIS — N18.4 CKD (CHRONIC KIDNEY DISEASE), STAGE IV: ICD-10-CM

## 2024-05-29 LAB
ABO + RH BLD: NORMAL
ALBUMIN SERPL BCP-MCNC: 3.2 G/DL (ref 3.5–5.2)
ALP SERPL-CCNC: 57 U/L (ref 55–135)
ALT SERPL W/O P-5'-P-CCNC: 42 U/L (ref 10–44)
ANION GAP SERPL CALC-SCNC: 9 MMOL/L (ref 8–16)
AST SERPL-CCNC: 35 U/L (ref 10–40)
BASOPHILS # BLD AUTO: 0.03 K/UL (ref 0–0.2)
BASOPHILS NFR BLD: 0.6 % (ref 0–1.9)
BILIRUB SERPL-MCNC: 0.4 MG/DL (ref 0.1–1)
BLD GP AB SCN CELLS X3 SERPL QL: NORMAL
BLD PROD TYP BPU: NORMAL
BLOOD UNIT EXPIRATION DATE: NORMAL
BLOOD UNIT TYPE CODE: 9500
BLOOD UNIT TYPE: NORMAL
BNP SERPL-MCNC: 1073 PG/ML (ref 0–99)
BUN SERPL-MCNC: 63 MG/DL (ref 8–23)
CALCIUM SERPL-MCNC: 9.3 MG/DL (ref 8.7–10.5)
CHLORIDE SERPL-SCNC: 111 MMOL/L (ref 95–110)
CO2 SERPL-SCNC: 18 MMOL/L (ref 23–29)
CODING SYSTEM: NORMAL
CREAT SERPL-MCNC: 3.8 MG/DL (ref 0.5–1.4)
CROSSMATCH INTERPRETATION: NORMAL
DIFFERENTIAL METHOD BLD: ABNORMAL
DISPENSE STATUS: NORMAL
EOSINOPHIL # BLD AUTO: 0.1 K/UL (ref 0–0.5)
EOSINOPHIL NFR BLD: 1.2 % (ref 0–8)
ERYTHROCYTE [DISTWIDTH] IN BLOOD BY AUTOMATED COUNT: 15.2 % (ref 11.5–14.5)
EST. GFR  (NO RACE VARIABLE): 15.1 ML/MIN/1.73 M^2
GLUCOSE SERPL-MCNC: 101 MG/DL (ref 70–110)
HCT VFR BLD AUTO: 18.8 % (ref 40–54)
HGB BLD-MCNC: 5.6 G/DL (ref 14–18)
IMM GRANULOCYTES # BLD AUTO: 0.02 K/UL (ref 0–0.04)
IMM GRANULOCYTES NFR BLD AUTO: 0.4 % (ref 0–0.5)
INR PPP: 8.2 (ref 0.8–1.2)
LYMPHOCYTES # BLD AUTO: 1.1 K/UL (ref 1–4.8)
LYMPHOCYTES NFR BLD: 21 % (ref 18–48)
MCH RBC QN AUTO: 29.8 PG (ref 27–31)
MCHC RBC AUTO-ENTMCNC: 29.8 G/DL (ref 32–36)
MCV RBC AUTO: 100 FL (ref 82–98)
MONOCYTES # BLD AUTO: 0.5 K/UL (ref 0.3–1)
MONOCYTES NFR BLD: 9.7 % (ref 4–15)
NEUTROPHILS # BLD AUTO: 3.4 K/UL (ref 1.8–7.7)
NEUTROPHILS NFR BLD: 67.1 % (ref 38–73)
NRBC BLD-RTO: 0 /100 WBC
NUM UNITS TRANS PACKED RBC: NORMAL
PLATELET # BLD AUTO: 188 K/UL (ref 150–450)
PMV BLD AUTO: 10.8 FL (ref 9.2–12.9)
POTASSIUM SERPL-SCNC: 4.8 MMOL/L (ref 3.5–5.1)
PROT SERPL-MCNC: 6.4 G/DL (ref 6–8.4)
PROTHROMBIN TIME: 78.6 SEC (ref 9–12.5)
RBC # BLD AUTO: 1.88 M/UL (ref 4.6–6.2)
SODIUM SERPL-SCNC: 138 MMOL/L (ref 136–145)
SPECIMEN OUTDATE: NORMAL
TROPONIN I SERPL DL<=0.01 NG/ML-MCNC: 0.03 NG/ML (ref 0–0.03)
WBC # BLD AUTO: 5.04 K/UL (ref 3.9–12.7)

## 2024-05-29 PROCEDURE — 93793 ANTICOAG MGMT PT WARFARIN: CPT | Mod: S$GLB,,,

## 2024-05-29 PROCEDURE — 83880 ASSAY OF NATRIURETIC PEPTIDE: CPT | Mod: HCNC | Performed by: STUDENT IN AN ORGANIZED HEALTH CARE EDUCATION/TRAINING PROGRAM

## 2024-05-29 PROCEDURE — P9016 RBC LEUKOCYTES REDUCED: HCPCS | Mod: HCNC

## 2024-05-29 PROCEDURE — 12000002 HC ACUTE/MED SURGE SEMI-PRIVATE ROOM: Mod: HCNC

## 2024-05-29 PROCEDURE — 86920 COMPATIBILITY TEST SPIN: CPT | Mod: HCNC

## 2024-05-29 PROCEDURE — 93005 ELECTROCARDIOGRAM TRACING: CPT | Mod: HCNC

## 2024-05-29 PROCEDURE — 86901 BLOOD TYPING SEROLOGIC RH(D): CPT | Mod: HCNC | Performed by: NURSE PRACTITIONER

## 2024-05-29 PROCEDURE — 85610 PROTHROMBIN TIME: CPT | Mod: 91,HCNC | Performed by: NURSE PRACTITIONER

## 2024-05-29 PROCEDURE — 85025 COMPLETE CBC W/AUTO DIFF WBC: CPT | Mod: 91,HCNC | Performed by: NURSE PRACTITIONER

## 2024-05-29 PROCEDURE — 30233N1 TRANSFUSION OF NONAUTOLOGOUS RED BLOOD CELLS INTO PERIPHERAL VEIN, PERCUTANEOUS APPROACH: ICD-10-PCS | Performed by: STUDENT IN AN ORGANIZED HEALTH CARE EDUCATION/TRAINING PROGRAM

## 2024-05-29 PROCEDURE — 99285 EMERGENCY DEPT VISIT HI MDM: CPT | Mod: 25,HCNC

## 2024-05-29 PROCEDURE — 25000003 PHARM REV CODE 250: Mod: HCNC | Performed by: STUDENT IN AN ORGANIZED HEALTH CARE EDUCATION/TRAINING PROGRAM

## 2024-05-29 PROCEDURE — 80053 COMPREHEN METABOLIC PANEL: CPT | Mod: 91,HCNC | Performed by: NURSE PRACTITIONER

## 2024-05-29 PROCEDURE — 93010 ELECTROCARDIOGRAM REPORT: CPT | Mod: HCNC,,, | Performed by: INTERNAL MEDICINE

## 2024-05-29 PROCEDURE — 84484 ASSAY OF TROPONIN QUANT: CPT | Mod: HCNC | Performed by: STUDENT IN AN ORGANIZED HEALTH CARE EDUCATION/TRAINING PROGRAM

## 2024-05-29 RX ORDER — HYDROCODONE BITARTRATE AND ACETAMINOPHEN 500; 5 MG/1; MG/1
TABLET ORAL
Status: DISCONTINUED | OUTPATIENT
Start: 2024-05-29 | End: 2024-05-31

## 2024-05-29 RX ORDER — PHYTONADIONE 5 MG/1
5 TABLET ORAL
Status: COMPLETED | OUTPATIENT
Start: 2024-05-29 | End: 2024-05-29

## 2024-05-29 RX ORDER — PHYTONADIONE 5 MG/1
10 TABLET ORAL
Status: DISCONTINUED | OUTPATIENT
Start: 2024-05-29 | End: 2024-05-29

## 2024-05-29 RX ADMIN — PHYTONADIONE 5 MG: 5 TABLET ORAL at 10:05

## 2024-05-29 NOTE — TELEPHONE ENCOUNTER
Spoke with patient and patient's wife over the phone about critical results and instructed them to go to the ED, as patient is in need of blood transfusion. Patient and patient's wife expressed understanding and are going immediately.

## 2024-05-29 NOTE — TELEPHONE ENCOUNTER
Critical alb was for the coumadin clinic. Patient goes to the clinic for PT/INRs to be done weekly. Was never given the critical PT/INR results.

## 2024-05-29 NOTE — PROGRESS NOTES
Gabriella with [facility] lab called in a verbal result as: INR 7.41 dated today 5/29/24

## 2024-05-29 NOTE — TELEPHONE ENCOUNTER
Spoke with Paola Henderson, RN supervisor at [facility]. States that she attempted to reach pt to inform him to be seen in the ED now, but was only able to leave a VM. States pt has low H&H, and also did have nose bleed today. Advised will reach out to pt to advised to be seen in ED.    Spoke with pt and advised informed that he is to be seen in ED at this time. States that his wife is at the grocery store now, and when she gets back will head over to Emergency room.  Reason for Disposition   Nursing judgment or information in reference    Protocols used: No Guideline [id number]

## 2024-05-29 NOTE — PROGRESS NOTES
[facility]    2024 4:27 PM    Assessment/Plan:    Patient presents today with supratherapeutic INR.    Assessment of patient findings and chart review: Pt's wife denies any changes     Recommendation for patient's warfarin regimen: Pt hold until INR re-assessed & have large serving of greens .     Recommend repeat INR Friday.   _________________________________________________________________    Dariel Devon Urbina (83 y.o.) is followed by the [facility].    Anticoagulation Summary  As of 2024      INR goal:  2.0-3.0   TTR:  67.9% (11.8 y)   INR used for dosin.4 (2024)   Warfarin maintenance plan:  5 mg (5 mg x 1) every day   Weekly warfarin total:  35 mg   Plan last modified:  Laurie Patino, PharmD (2024)   Next INR check:  2024   Target end date:      Indications    Anticoagulant long-term use (Resolved) [Z79.01]  H/O mechanical aortic valve replacement (Resolved) [Z95.2]                 Anticoagulation Episode Summary       INR check location:  Clinic Lab    Preferred lab:      Send INR reminders to:  [facility] COUMADIN MONITORING POOL    Comments:  [facility] only Mon - Thu // carpel tunnel release  - target low end of range          Anticoagulation Care Providers       Provider Role Specialty Phone number    Devon Garnica MD [facility] [phone]

## 2024-05-29 NOTE — TELEPHONE ENCOUNTER
Hem lab called pt H/H 5.9/19.8 advisory is to go to the ER, patient has had blood transfusions due to nosebleeds, he had a nosebleed today in [facility] lobby. Please advise patient to GO TO THE ER for emergent treatment for extremely low blood levels. Thank you. Spoke to Vicky with [facility] she will try him back tonight, No one answered when I called only 1 number for he and his wife.

## 2024-05-30 PROBLEM — J96.01 ACUTE HYPOXIC RESPIRATORY FAILURE: Status: ACTIVE | Noted: 2024-05-30

## 2024-05-30 PROBLEM — I10 BENIGN ESSENTIAL HTN: Status: ACTIVE | Noted: 2024-05-30

## 2024-05-30 PROBLEM — Z71.89 ACP (ADVANCE CARE PLANNING): Status: ACTIVE | Noted: 2024-05-30

## 2024-05-30 PROBLEM — E87.70 VOLUME OVERLOAD: Status: ACTIVE | Noted: 2024-05-30

## 2024-05-30 PROBLEM — I50.23 ACUTE ON CHRONIC SYSTOLIC HEART FAILURE: Status: ACTIVE | Noted: 2024-05-30

## 2024-05-30 LAB
ALBUMIN SERPL BCP-MCNC: 3 G/DL (ref 3.5–5.2)
ALP SERPL-CCNC: 52 U/L (ref 55–135)
ALT SERPL W/O P-5'-P-CCNC: 37 U/L (ref 10–44)
AMORPH CRY UR QL COMP ASSIST: NORMAL
ANION GAP SERPL CALC-SCNC: 9 MMOL/L (ref 8–16)
ANION GAP SERPL CALC-SCNC: 9 MMOL/L (ref 8–16)
APTT PPP: 45.8 SEC (ref 21–32)
ASCENDING AORTA: 3.3 CM
AST SERPL-CCNC: 29 U/L (ref 10–40)
AV INDEX (PROSTH): 0.31
AV MEAN GRADIENT: 19 MMHG
AV PEAK GRADIENT: 36 MMHG
AV VALVE AREA BY VELOCITY RATIO: 0.75 CM²
AV VALVE AREA: 0.87 CM²
AV VELOCITY RATIO: 0.26
BACTERIA #/AREA URNS AUTO: NORMAL /HPF
BASOPHILS # BLD AUTO: 0.03 K/UL (ref 0–0.2)
BASOPHILS NFR BLD: 0.6 % (ref 0–1.9)
BILIRUB SERPL-MCNC: 0.7 MG/DL (ref 0.1–1)
BILIRUB UR QL STRIP: NEGATIVE
BLD PROD TYP BPU: NORMAL
BLOOD UNIT EXPIRATION DATE: NORMAL
BLOOD UNIT TYPE CODE: 9500
BLOOD UNIT TYPE: NORMAL
BSA FOR ECHO PROCEDURE: 1.89 M2
BUN SERPL-MCNC: 59 MG/DL (ref 8–23)
BUN SERPL-MCNC: 60 MG/DL (ref 8–23)
CALCIUM SERPL-MCNC: 9.4 MG/DL (ref 8.7–10.5)
CALCIUM SERPL-MCNC: 9.6 MG/DL (ref 8.7–10.5)
CHLORIDE SERPL-SCNC: 113 MMOL/L (ref 95–110)
CHLORIDE SERPL-SCNC: 114 MMOL/L (ref 95–110)
CLARITY UR REFRACT.AUTO: CLEAR
CO2 SERPL-SCNC: 17 MMOL/L (ref 23–29)
CO2 SERPL-SCNC: 17 MMOL/L (ref 23–29)
CODING SYSTEM: NORMAL
COLOR UR AUTO: YELLOW
CREAT SERPL-MCNC: 3.5 MG/DL (ref 0.5–1.4)
CREAT SERPL-MCNC: 3.6 MG/DL (ref 0.5–1.4)
CROSSMATCH INTERPRETATION: NORMAL
CV ECHO LV RWT: 0.58 CM
DIFFERENTIAL METHOD BLD: ABNORMAL
DISPENSE STATUS: NORMAL
DOP CALC AO PEAK VEL: 3.02 M/S
DOP CALC AO VTI: 66.81 CM
DOP CALC LVOT AREA: 2.8 CM2
DOP CALC LVOT DIAMETER: 1.9 CM
DOP CALC LVOT PEAK VEL: 0.8 M/S
DOP CALC LVOT STROKE VOLUME: 58.09 CM3
DOP CALC MV VTI: 16.87 CM
DOP CALCLVOT PEAK VEL VTI: 20.5 CM
E WAVE DECELERATION TIME: 477.16 MSEC
E/E' RATIO: 20.25 M/S
ECHO LV POSTERIOR WALL: 1.41 CM (ref 0.6–1.1)
EOSINOPHIL # BLD AUTO: 0.1 K/UL (ref 0–0.5)
EOSINOPHIL NFR BLD: 1.7 % (ref 0–8)
ERYTHROCYTE [DISTWIDTH] IN BLOOD BY AUTOMATED COUNT: 22.9 % (ref 11.5–14.5)
EST. GFR  (NO RACE VARIABLE): 16.2 ML/MIN/1.73 M^2
EST. GFR  (NO RACE VARIABLE): 16.7 ML/MIN/1.73 M^2
ESTIMATED AVG GLUCOSE: 105 MG/DL (ref 68–131)
FRACTIONAL SHORTENING: 26 % (ref 28–44)
GLUCOSE SERPL-MCNC: 82 MG/DL (ref 70–110)
GLUCOSE SERPL-MCNC: 91 MG/DL (ref 70–110)
GLUCOSE UR QL STRIP: NEGATIVE
HBA1C MFR BLD: 5.3 % (ref 4–5.6)
HCT VFR BLD AUTO: 22.6 % (ref 40–54)
HGB BLD-MCNC: 7 G/DL (ref 14–18)
HGB BLD-MCNC: 7.1 G/DL (ref 14–18)
HGB UR QL STRIP: ABNORMAL
HYALINE CASTS UR QL AUTO: 0 /LPF
IMM GRANULOCYTES # BLD AUTO: 0.01 K/UL (ref 0–0.04)
IMM GRANULOCYTES NFR BLD AUTO: 0.2 % (ref 0–0.5)
INR PPP: 5.5 (ref 0.8–1.2)
INR PPP: 7.3 (ref 0.8–1.2)
INTERVENTRICULAR SEPTUM: 0.99 CM (ref 0.6–1.1)
IVRT: 45.67 MSEC
KETONES UR QL STRIP: NEGATIVE
LA MAJOR: 7.26 CM
LA MINOR: 7.31 CM
LA WIDTH: 5.57 CM
LEFT ATRIUM SIZE: 5.74 CM
LEFT ATRIUM VOLUME INDEX MOD: 77.5 ML/M2
LEFT ATRIUM VOLUME INDEX: 106.4 ML/M2
LEFT ATRIUM VOLUME MOD: 144.2 CM3
LEFT ATRIUM VOLUME: 197.98 CM3
LEFT INTERNAL DIMENSION IN SYSTOLE: 3.61 CM (ref 2.1–4)
LEFT VENTRICLE DIASTOLIC VOLUME INDEX: 60.09 ML/M2
LEFT VENTRICLE DIASTOLIC VOLUME: 111.77 ML
LEFT VENTRICLE MASS INDEX: 121 G/M2
LEFT VENTRICLE SYSTOLIC VOLUME INDEX: 29.6 ML/M2
LEFT VENTRICLE SYSTOLIC VOLUME: 54.97 ML
LEFT VENTRICULAR INTERNAL DIMENSION IN DIASTOLE: 4.88 CM (ref 3.5–6)
LEFT VENTRICULAR MASS: 224.92 G
LEUKOCYTE ESTERASE UR QL STRIP: NEGATIVE
LV LATERAL E/E' RATIO: 20.25 M/S
LV SEPTAL E/E' RATIO: 20.25 M/S
LYMPHOCYTES # BLD AUTO: 0.8 K/UL (ref 1–4.8)
LYMPHOCYTES NFR BLD: 17.5 % (ref 18–48)
MAGNESIUM SERPL-MCNC: 2.3 MG/DL (ref 1.6–2.6)
MAGNESIUM SERPL-MCNC: 2.3 MG/DL (ref 1.6–2.6)
MCH RBC QN AUTO: 28.6 PG (ref 27–31)
MCHC RBC AUTO-ENTMCNC: 31 G/DL (ref 32–36)
MCV RBC AUTO: 92 FL (ref 82–98)
MICROSCOPIC COMMENT: NORMAL
MONOCYTES # BLD AUTO: 0.5 K/UL (ref 0.3–1)
MONOCYTES NFR BLD: 9.6 % (ref 4–15)
MV MEAN GRADIENT: 0 MMHG
MV PEAK E VEL: 1.62 M/S
MV PEAK GRADIENT: 2 MMHG
MV STENOSIS PRESSURE HALF TIME: 138.38 MS
MV VALVE AREA BY CONTINUITY EQUATION: 3.44 CM2
MV VALVE AREA P 1/2 METHOD: 1.59 CM2
NEUTROPHILS # BLD AUTO: 3.4 K/UL (ref 1.8–7.7)
NEUTROPHILS NFR BLD: 70.4 % (ref 38–73)
NITRITE UR QL STRIP: NEGATIVE
NRBC BLD-RTO: 0 /100 WBC
NUM UNITS TRANS PACKED RBC: NORMAL
OHS CV RV/LV RATIO: 0.92 CM
OHS LV EJECTION FRACTION SIMPSONS BIPLANE MOD: 51 %
OHS QRS DURATION: 160 MS
OHS QTC CALCULATION: 481 MS
PH UR STRIP: 6 [PH] (ref 5–8)
PHOSPHATE SERPL-MCNC: 4.8 MG/DL (ref 2.7–4.5)
PISA MRMAX VEL: 0.05 M/S
PISA TR MAX VEL: 3.5 M/S
PLATELET # BLD AUTO: 174 K/UL (ref 150–450)
PMV BLD AUTO: 10.6 FL (ref 9.2–12.9)
POTASSIUM SERPL-SCNC: 4.4 MMOL/L (ref 3.5–5.1)
POTASSIUM SERPL-SCNC: 4.6 MMOL/L (ref 3.5–5.1)
PROT SERPL-MCNC: 6.1 G/DL (ref 6–8.4)
PROT UR QL STRIP: ABNORMAL
PROTHROMBIN TIME: 53.7 SEC (ref 9–12.5)
PROTHROMBIN TIME: 70.1 SEC (ref 9–12.5)
PULM VEIN S/D RATIO: 0.23
PV PEAK D VEL: 1.04 M/S
PV PEAK S VEL: 0.24 M/S
RA MAJOR: 6.3 CM
RA PRESSURE ESTIMATED: 8 MMHG
RA WIDTH: 4.96 CM
RBC # BLD AUTO: 2.45 M/UL (ref 4.6–6.2)
RBC #/AREA URNS AUTO: 1 /HPF (ref 0–4)
RIGHT VENTRICULAR END-DIASTOLIC DIMENSION: 4.48 CM
RV TB RVSP: 12 MMHG
SINUS: 2.73 CM
SODIUM SERPL-SCNC: 139 MMOL/L (ref 136–145)
SODIUM SERPL-SCNC: 140 MMOL/L (ref 136–145)
SP GR UR STRIP: 1.01 (ref 1–1.03)
SQUAMOUS #/AREA URNS AUTO: 0 /HPF
STJ: 2.53 CM
TDI LATERAL: 0.08 M/S
TDI SEPTAL: 0.08 M/S
TDI: 0.08 M/S
TR MAX PG: 49 MMHG
TRICUSPID ANNULAR PLANE SYSTOLIC EXCURSION: 1.87 CM
TV REST PULMONARY ARTERY PRESSURE: 57 MMHG
URN SPEC COLLECT METH UR: ABNORMAL
WBC # BLD AUTO: 4.79 K/UL (ref 3.9–12.7)
WBC #/AREA URNS AUTO: 1 /HPF (ref 0–5)
Z-SCORE OF LEFT VENTRICULAR DIMENSION IN END DIASTOLE: -0.59
Z-SCORE OF LEFT VENTRICULAR DIMENSION IN END SYSTOLE: 0.97

## 2024-05-30 PROCEDURE — 83036 HEMOGLOBIN GLYCOSYLATED A1C: CPT | Mod: HCNC | Performed by: HOSPITALIST

## 2024-05-30 PROCEDURE — 84100 ASSAY OF PHOSPHORUS: CPT | Mod: HCNC | Performed by: HOSPITALIST

## 2024-05-30 PROCEDURE — 85018 HEMOGLOBIN: CPT | Mod: HCNC | Performed by: STUDENT IN AN ORGANIZED HEALTH CARE EDUCATION/TRAINING PROGRAM

## 2024-05-30 PROCEDURE — 36415 COLL VENOUS BLD VENIPUNCTURE: CPT | Mod: HCNC | Performed by: STUDENT IN AN ORGANIZED HEALTH CARE EDUCATION/TRAINING PROGRAM

## 2024-05-30 PROCEDURE — 83735 ASSAY OF MAGNESIUM: CPT | Mod: HCNC | Performed by: HOSPITALIST

## 2024-05-30 PROCEDURE — 25000003 PHARM REV CODE 250: Mod: HCNC | Performed by: HOSPITALIST

## 2024-05-30 PROCEDURE — 20600001 HC STEP DOWN PRIVATE ROOM: Mod: HCNC

## 2024-05-30 PROCEDURE — 63600175 PHARM REV CODE 636 W HCPCS: Mod: HCNC | Performed by: STUDENT IN AN ORGANIZED HEALTH CARE EDUCATION/TRAINING PROGRAM

## 2024-05-30 PROCEDURE — 63600175 PHARM REV CODE 636 W HCPCS: Mod: HCNC

## 2024-05-30 PROCEDURE — 85730 THROMBOPLASTIN TIME PARTIAL: CPT | Mod: HCNC | Performed by: HOSPITALIST

## 2024-05-30 PROCEDURE — 85025 COMPLETE CBC W/AUTO DIFF WBC: CPT | Mod: HCNC | Performed by: HOSPITALIST

## 2024-05-30 PROCEDURE — 36415 COLL VENOUS BLD VENIPUNCTURE: CPT | Mod: HCNC | Performed by: HOSPITALIST

## 2024-05-30 PROCEDURE — 80053 COMPREHEN METABOLIC PANEL: CPT | Mod: HCNC | Performed by: HOSPITALIST

## 2024-05-30 PROCEDURE — 85610 PROTHROMBIN TIME: CPT | Mod: HCNC | Performed by: HOSPITALIST

## 2024-05-30 PROCEDURE — 86920 COMPATIBILITY TEST SPIN: CPT | Mod: HCNC | Performed by: HOSPITALIST

## 2024-05-30 PROCEDURE — P9016 RBC LEUKOCYTES REDUCED: HCPCS | Mod: HCNC | Performed by: HOSPITALIST

## 2024-05-30 PROCEDURE — 80048 BASIC METABOLIC PNL TOTAL CA: CPT | Mod: HCNC,XB | Performed by: STUDENT IN AN ORGANIZED HEALTH CARE EDUCATION/TRAINING PROGRAM

## 2024-05-30 PROCEDURE — 85610 PROTHROMBIN TIME: CPT | Mod: 91,HCNC | Performed by: STUDENT IN AN ORGANIZED HEALTH CARE EDUCATION/TRAINING PROGRAM

## 2024-05-30 PROCEDURE — 63600175 PHARM REV CODE 636 W HCPCS: Mod: HCNC | Performed by: HOSPITALIST

## 2024-05-30 PROCEDURE — 83735 ASSAY OF MAGNESIUM: CPT | Mod: 91,HCNC | Performed by: STUDENT IN AN ORGANIZED HEALTH CARE EDUCATION/TRAINING PROGRAM

## 2024-05-30 PROCEDURE — C9113 INJ PANTOPRAZOLE SODIUM, VIA: HCPCS | Mod: HCNC | Performed by: HOSPITALIST

## 2024-05-30 PROCEDURE — 81001 URINALYSIS AUTO W/SCOPE: CPT | Mod: HCNC | Performed by: HOSPITALIST

## 2024-05-30 RX ORDER — IBUPROFEN 200 MG
16 TABLET ORAL
Status: DISCONTINUED | OUTPATIENT
Start: 2024-05-30 | End: 2024-06-03 | Stop reason: HOSPADM

## 2024-05-30 RX ORDER — FUROSEMIDE 10 MG/ML
40 INJECTION INTRAMUSCULAR; INTRAVENOUS ONCE
Status: COMPLETED | OUTPATIENT
Start: 2024-05-30 | End: 2024-05-30

## 2024-05-30 RX ORDER — PANTOPRAZOLE SODIUM 40 MG/10ML
40 INJECTION, POWDER, LYOPHILIZED, FOR SOLUTION INTRAVENOUS 2 TIMES DAILY
Status: DISCONTINUED | OUTPATIENT
Start: 2024-05-30 | End: 2024-06-01

## 2024-05-30 RX ORDER — HYDROCODONE BITARTRATE AND ACETAMINOPHEN 500; 5 MG/1; MG/1
TABLET ORAL
Status: DISCONTINUED | OUTPATIENT
Start: 2024-05-30 | End: 2024-05-31

## 2024-05-30 RX ORDER — ISOSORBIDE MONONITRATE 60 MG/1
60 TABLET, EXTENDED RELEASE ORAL NIGHTLY
Status: DISCONTINUED | OUTPATIENT
Start: 2024-05-30 | End: 2024-06-03 | Stop reason: HOSPADM

## 2024-05-30 RX ORDER — FUROSEMIDE 10 MG/ML
40 INJECTION INTRAMUSCULAR; INTRAVENOUS EVERY 12 HOURS
Status: DISCONTINUED | OUTPATIENT
Start: 2024-05-30 | End: 2024-05-31

## 2024-05-30 RX ORDER — AMLODIPINE BESYLATE 5 MG/1
5 TABLET ORAL DAILY
Status: DISCONTINUED | OUTPATIENT
Start: 2024-05-30 | End: 2024-06-03 | Stop reason: HOSPADM

## 2024-05-30 RX ORDER — METOPROLOL SUCCINATE 25 MG/1
25 TABLET, EXTENDED RELEASE ORAL DAILY
Status: DISCONTINUED | OUTPATIENT
Start: 2024-05-30 | End: 2024-06-03 | Stop reason: HOSPADM

## 2024-05-30 RX ORDER — TRAZODONE HYDROCHLORIDE 50 MG/1
50 TABLET ORAL NIGHTLY PRN
Status: DISCONTINUED | OUTPATIENT
Start: 2024-05-30 | End: 2024-06-03 | Stop reason: HOSPADM

## 2024-05-30 RX ORDER — ONDANSETRON HYDROCHLORIDE 2 MG/ML
4 INJECTION, SOLUTION INTRAVENOUS EVERY 8 HOURS PRN
Status: DISCONTINUED | OUTPATIENT
Start: 2024-05-30 | End: 2024-06-03 | Stop reason: HOSPADM

## 2024-05-30 RX ORDER — NALOXONE HCL 0.4 MG/ML
0.02 VIAL (ML) INJECTION
Status: DISCONTINUED | OUTPATIENT
Start: 2024-05-30 | End: 2024-06-03 | Stop reason: HOSPADM

## 2024-05-30 RX ORDER — IBUPROFEN 200 MG
24 TABLET ORAL
Status: DISCONTINUED | OUTPATIENT
Start: 2024-05-30 | End: 2024-06-03 | Stop reason: HOSPADM

## 2024-05-30 RX ORDER — IPRATROPIUM BROMIDE AND ALBUTEROL SULFATE 2.5; .5 MG/3ML; MG/3ML
3 SOLUTION RESPIRATORY (INHALATION) EVERY 4 HOURS PRN
Status: DISCONTINUED | OUTPATIENT
Start: 2024-05-30 | End: 2024-06-02

## 2024-05-30 RX ORDER — ATORVASTATIN CALCIUM 40 MG/1
40 TABLET, FILM COATED ORAL DAILY
Status: DISCONTINUED | OUTPATIENT
Start: 2024-05-30 | End: 2024-06-03 | Stop reason: HOSPADM

## 2024-05-30 RX ORDER — ACETAMINOPHEN 325 MG/1
650 TABLET ORAL EVERY 4 HOURS PRN
Status: DISCONTINUED | OUTPATIENT
Start: 2024-05-30 | End: 2024-06-03 | Stop reason: HOSPADM

## 2024-05-30 RX ORDER — SODIUM CHLORIDE 0.9 % (FLUSH) 0.9 %
10 SYRINGE (ML) INJECTION EVERY 12 HOURS PRN
Status: DISCONTINUED | OUTPATIENT
Start: 2024-05-30 | End: 2024-06-03 | Stop reason: HOSPADM

## 2024-05-30 RX ORDER — GLUCAGON 1 MG
1 KIT INJECTION
Status: DISCONTINUED | OUTPATIENT
Start: 2024-05-30 | End: 2024-06-03 | Stop reason: HOSPADM

## 2024-05-30 RX ORDER — FERROUS GLUCONATE 324(37.5)
324 TABLET ORAL
Status: DISCONTINUED | OUTPATIENT
Start: 2024-05-30 | End: 2024-05-31

## 2024-05-30 RX ORDER — OXYMETAZOLINE HCL 0.05 %
2 SPRAY, NON-AEROSOL (ML) NASAL EVERY 12 HOURS PRN
Status: DISPENSED | OUTPATIENT
Start: 2024-05-30 | End: 2024-06-02

## 2024-05-30 RX ORDER — SODIUM CHLORIDE 0.9 % (FLUSH) 0.9 %
10 SYRINGE (ML) INJECTION
Status: DISCONTINUED | OUTPATIENT
Start: 2024-05-30 | End: 2024-06-03 | Stop reason: HOSPADM

## 2024-05-30 RX ADMIN — PANTOPRAZOLE SODIUM 40 MG: 40 INJECTION, POWDER, FOR SOLUTION INTRAVENOUS at 02:05

## 2024-05-30 RX ADMIN — FUROSEMIDE 40 MG: 10 INJECTION, SOLUTION INTRAVENOUS at 05:05

## 2024-05-30 RX ADMIN — FUROSEMIDE 40 MG: 10 INJECTION, SOLUTION INTRAVENOUS at 12:05

## 2024-05-30 RX ADMIN — ALLOPURINOL 100 MG: 300 TABLET ORAL at 08:05

## 2024-05-30 RX ADMIN — ISOSORBIDE MONONITRATE 60 MG: 60 TABLET, EXTENDED RELEASE ORAL at 02:05

## 2024-05-30 RX ADMIN — PANTOPRAZOLE SODIUM 40 MG: 40 INJECTION, POWDER, FOR SOLUTION INTRAVENOUS at 08:05

## 2024-05-30 RX ADMIN — SODIUM CHLORIDE 500 ML: 9 INJECTION, SOLUTION INTRAVENOUS at 02:05

## 2024-05-30 RX ADMIN — PANTOPRAZOLE SODIUM 40 MG: 40 INJECTION, POWDER, FOR SOLUTION INTRAVENOUS at 09:05

## 2024-05-30 RX ADMIN — AMLODIPINE BESYLATE 5 MG: 5 TABLET ORAL at 08:05

## 2024-05-30 RX ADMIN — ATORVASTATIN CALCIUM 40 MG: 40 TABLET, FILM COATED ORAL at 08:05

## 2024-05-30 RX ADMIN — Medication 324 MG: at 08:05

## 2024-05-30 RX ADMIN — ISOSORBIDE MONONITRATE 60 MG: 60 TABLET, EXTENDED RELEASE ORAL at 08:05

## 2024-05-30 NOTE — ASSESSMENT & PLAN NOTE
Patient's anemia is currently uncontrolled. Has received 1 units of PRBCs on 5/29/24 . Etiology likely d/t acute blood loss which was from epistaxis.  Current CBC reviewed-   Lab Results   Component Value Date    HGB 7.0 (L) 05/30/2024    HCT 22.6 (L) 05/30/2024     Monitor serial CBC and transfuse if patient becomes hemodynamically unstable, symptomatic or H/H drops below 7/21.  -ordered 2nd unit PRBC which is held due to acute dyspnea and hypoxia due to fluid overload   -CXR showed increased pulmonary edema compared to prior   -repeat Hgb improved to 7  -plan to complete 2nd unit PRBC after diuresis lasix 40 mg IV x 1 given

## 2024-05-30 NOTE — ASSESSMENT & PLAN NOTE
Chronic, controlled. Latest blood pressure and vitals reviewed-     Temp:  [97.3 °F (36.3 °C)-97.9 °F (36.6 °C)]   Pulse:  [45-60]   Resp:  [16-22]   BP: (101-156)/(49-70)   SpO2:  [92 %-100 %] .   Home meds for hypertension were reviewed and noted below.   Hypertension Medications               amLODIPine (NORVASC) 5 MG tablet Take 1 tablet (5 mg total) by mouth once daily.    bumetanide (BUMEX) 1 MG tablet Take 1 tablet (1 mg total) by mouth every other day.    isosorbide mononitrate (IMDUR) 60 MG 24 hr tablet TAKE 1 TABLET EVERY EVENING    metoprolol succinate (TOPROL-XL) 25 MG 24 hr tablet Take 1 tablet (25 mg total) by mouth once daily.            While in the hospital, will manage blood pressure as follows; Continue home antihypertensive regimen    Will utilize p.r.n. blood pressure medication only if patient's blood pressure greater than 180/110 and he develops symptoms such as worsening chest pain or shortness of breath.

## 2024-05-30 NOTE — ED PROVIDER NOTES
Encounter Date: 5/29/2024       History     Chief Complaint   Patient presents with    Abnormal Lab     Low hgb , been having nose bleeds but not now    Chest Pain     HPI  Dariel Urbina is a 83 y.o. male, PMH HLD, DMT2, CAD, Anemia, Mitral insufficiency, A-fib, presenting with complaints of 6 months hx of weakness and generalized fatigue with associated daily nose bleeds for months and told to come to the ED from his PCP for a dangerously low hemoglobin. Pt reports daily nosebleeds but states that today was worse and he bled profusely into the afternoon. He was noted to be anemic and supra-therapeutic on his warfarin with PCP today and told he needed emergency blood transfusion. He was told to hold his Warfarin for two days and eat leafy greens. He is reports recent follow up with ENT and has multiple procedures to control the nose bleeds but feels none of these have made a difference. He denies active nose bleed at the time of arrival today. He reports some associated SOB over the last month and does not have the energy to do his daily activities. He reports adherence with home Bumex but feels he has been urinating less often and has increased swelling of the bilateral LE.     Review of patient's allergies indicates:   Allergen Reactions    Lisinopril     Losartan      Intolerance- elevates potassium level     Past Medical History:   Diagnosis Date    Anemia of chronic renal failure, stage 3 (moderate) 05/27/2015    Anticoagulant long-term use     Atherosclerosis of coronary artery bypass graft of native heart without angina pectoris 09/11/2012    3-27-18 [facility] Two vessel coronary artery disease.   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    Bilateral carotid artery disease 02/09/2017    Bleeding from the nose     Bleeding nose 03/21/2018    Cancer     Cataract     CHF (congestive heart failure)     CKD (chronic kidney disease) stage 3, GFR 30-59 ml/min 05/27/2015    Claudication of  left lower extremity 09/17/2014    Colon polyp     Encounter for blood transfusion     Gastroesophageal reflux disease without esophagitis 03/19/2018    Gastrointestinal hemorrhage associated with intestinal diverticulosis 04/01/2018    Glaucoma     H/O mechanical aortic valve replacement 09/17/2014    History of gout 09/26/2012    Hyperparathyroidism due to renal insufficiency 07/27/2015    Internal hemorrhoid 04/03/2018    Long term current use of anticoagulant therapy 09/26/2012    Mechanical heart valve present     Metabolic acidosis with normal anion gap and bicarbonate losses 03/20/2018    Mixed hyperlipidemia 09/26/2012    NSTEMI (non-ST elevated myocardial infarction) 03/21/2018    Obesity, diabetes, and hypertension syndrome 02/23/2016    Paroxysmal atrial fibrillation 02/06/2023    PVD (peripheral vascular disease) 09/11/2012    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     Past Surgical History:   Procedure Laterality Date    BACK SURGERY      CARDIAC CATHETERIZATION      CARDIAC VALVE REPLACEMENT      CARDIAC VALVE SURGERY      CARPAL TUNNEL RELEASE Right 05/19/2020    Procedure: RELEASE, CARPAL TUNNEL;  Surgeon: Rupesh Norris Jr., MD;  Location: [facility];  Service: Plastics;  Laterality: Right;    CATARACT EXTRACTION Left 11/13/2022        COLON SURGERY      COLONOSCOPY N/A 03/31/2017    Procedure: COLONOSCOPY;  Surgeon: Bruno Raymond MD;  Location: [facility] (4TH FLR);  Service: Endoscopy;  Laterality: N/A;  Patient's wife requesting date.    COLONOSCOPY N/A 04/03/2018    Procedure: COLONOSCOPY;  Surgeon: Bonifacio Pelletier MD;  Location: [facility] ENDO (2ND FLR);  Service: Endoscopy;  Laterality:  N/A;    COLONOSCOPY N/A 08/13/2018    Procedure: COLONOSCOPY;  Surgeon: Kam Barba MD;  Location: [facility] ENDO (2ND FLR);  Service: Endoscopy;  Laterality: N/A;  2nd floor: PA pressure 49; hx of moderate-severe valve disease     per Coumadin clinic-Patient can hold 5 days with lovenox bridge       ok to schedule per Katarina    CORONARY ANGIOGRAPHY N/A 10/04/2021    Procedure: Left heart cath +/- peripheral angiogram;  Surgeon: Jose Ruiz MD;  Location: [facility] CATH LAB;  Service: Cardiology;  Laterality: N/A;    CORONARY ANGIOGRAPHY N/A 3/2/2023    Procedure: Angiogram, Coronary;  Surgeon: Devon Garnica MD;  Location: [facility] CATH LAB;  Service: Cardiology;  Laterality: N/A;    CORONARY ANGIOPLASTY      CORONARY ARTERY BYPASS GRAFT      CORONARY BYPASS GRAFT ANGIOGRAPHY  10/04/2021    Procedure: Bypass graft study;  Surgeon: Jose Ruiz MD;  Location: [facility] CATH LAB;  Service: Cardiology;;    CORONARY BYPASS GRAFT ANGIOGRAPHY  3/2/2023    Procedure: Bypass graft study;  Surgeon: Devon Garnica MD;  Location: [facility] CATH LAB;  Service: Cardiology;;    ECHOCARDIOGRAM,TRANSESOPHAGEAL N/A 4/14/2023    Procedure: Transesophageal echo (KIRSTEN) intra-procedure log documentation;  Surgeon: [facility] Diagnostic Provider;  Location: [facility] EP LAB;  Service: Cardiology;  Laterality: N/A;    ENDOSCOPIC NASAL CAUTERIZATION Bilateral 11/3/2023    Procedure: CAUTERIZATION, NOSE, ENDOSCOPIC;  Surgeon: Jono Russell MD;  Location: [facility] OR [facility] FLR;  Service: ENT;  Laterality: Bilateral;    ENDOSCOPIC NASAL CAUTERIZATION N/A 12/18/2023    Procedure: CAUTERIZATION, NOSE, ENDOSCOPIC;  Surgeon: Jono Russell MD;  Location: [facility] OR [facility] FLR;  Service: ENT;  Laterality: N/A;    EYE SURGERY      FESS, WITH IMAGING GUIDANCE Left 2/28/2024    Procedure: FESS, WITH IMAGING GUIDANCE;  Surgeon: Jono Russell MD;  Location: [facility] OR 2ND FLR;  Service: ENT;  Laterality: Left;  Scan [facility]    FUNCTIONAL ENDOSCOPIC SINUS  SURGERY (FESS) Bilateral 6/14/2023    Procedure: FESS (FUNCTIONAL ENDOSCOPIC SINUS SURGERY);  Surgeon: Jono Russell MD;  Location: [facility] OR 2ND FLR;  Service: ENT;  Laterality: Bilateral;    HERNIA REPAIR      INTRAOCULAR PROSTHESES INSERTION Left 11/13/2022    Procedure: INSERTION, IOL PROSTHESIS;  Surgeon: Alai Mckeon MD;  [facility];  Service: Ophthalmology;  Laterality: Left;    INTRAOCULAR PROSTHESES INSERTION Right 12/04/2022    Procedure: INSERTION, IOL PROSTHESIS;  Surgeon: Alia Mckeon MD;  [facility];  Service: Ophthalmology;  Laterality: Right;    LIGATION, ARTERY, ETHMOIDAL Left 2/28/2024    Procedure: LIGATION, ARTERY, ETHMOIDAL;  Surgeon: Jono Russell MD;  Location: [facility] OR 2ND FLR;  Service: ENT;  Laterality: Left;    LIGATION, ARTERY, SPHENOPALATINE, ENDOSCOPIC Bilateral 6/14/2023    Procedure: LIGATION, ARTERY, SPHENOPALATINE, ENDOSCOPIC;  Surgeon: Jono Russell MD;  [facility];  Service: ENT;  Laterality: Bilateral;    PHACOEMULSIFICATION OF CATARACT Left 11/13/2022    Procedure: PHACOEMULSIFICATION, CATARACT;  Surgeon: Alia Mckeon MD;  Location: [facility] OR [address] [place] [address];  Service: Ophthalmology;  Laterality: Left;    PHACOEMULSIFICATION OF CATARACT Right 12/04/2022    Procedure: PHACOEMULSIFICATION, CATARACT;  Surgeon: Alia Mckeon MD;  Location: [facility] OR [address] [place] [address];  Service: Ophthalmology;  Laterality: Right;    SPINE SURGERY      TRANSESOPHAGEAL ECHOCARDIOGRAPHY N/A 3/22/2023    Procedure: ECHOCARDIOGRAM, TRANSESOPHAGEAL;  Surgeon: Zenia Diagnostic Provider;  Location: [facility] EP LAB;  Service: Anesthesiology;  Laterality: N/A;    TRANSESOPHAGEAL ECHOCARDIOGRAPHY N/A 4/11/2023    Procedure: ECHOCARDIOGRAM, TRANSESOPHAGEAL;  Surgeon: Zenia Diagnostic Provider;  Location: [facility] EP LAB;  Service: Anesthesiology;  Laterality: N/A;    VASECTOMY       Family History   Problem Relation Name Age of Onset    Heart failure Mother      Heart  disease Mother      Heart failure Father      Heart disease Father      Alcohol abuse Father      Heart failure Brother      Heart disease Brother      Diabetes Brother      No Known Problems Sister      No Known Problems Maternal Grandmother      No Known Problems Maternal Grandfather      No Known Problems Paternal Grandmother      No Known Problems Paternal Grandfather      Heart disease Sister      No Known Problems Maternal Aunt      No Known Problems Maternal Uncle      No Known Problems Paternal Aunt      No Known Problems Paternal Uncle      Amblyopia Neg Hx      Blindness Neg Hx      Cancer Neg Hx      Cataracts Neg Hx      Glaucoma Neg Hx      Hypertension Neg Hx      Macular degeneration Neg Hx      Retinal detachment Neg Hx      Strabismus Neg Hx      Stroke Neg Hx      Thyroid disease Neg Hx      Anemia Neg Hx      Arrhythmia Neg Hx      Asthma Neg Hx      Clotting disorder Neg Hx      Fainting Neg Hx      Heart attack Neg Hx      Hyperlipidemia Neg Hx      Atrial Septal Defect Neg Hx      Melanoma Neg Hx       Social History     Tobacco Use    Smoking status: Former     Current packs/day: 0.00     Average packs/day: 1 pack/day for 20.0 years (20.0 ttl pk-yrs)     Types: Cigarettes     Start date: 1960     Quit date: 1980     Years since quittin.7     Passive exposure: Never    Smokeless tobacco: Never   Substance Use Topics    Alcohol use: No    Drug use: No     Review of Systems    Physical Exam     Initial Vitals [24 1848]   BP Pulse Resp Temp SpO2   (!) 134/59 (!) 53 18 97.5 °F (36.4 °C) 97 %      MAP       --         Physical Exam    Nursing note and vitals reviewed.  Constitutional: He appears well-developed and well-nourished. He is not diaphoretic. No distress.   HENT:   Head: Normocephalic.   Mouth/Throat: Oropharynx is clear and moist. No trismus in the jaw. Abnormal dentition. No uvula swelling. No oropharyngeal exudate, posterior oropharyngeal edema or posterior  oropharyngeal erythema.   No evidence of active bleed in the posterior oropharynx. Dried blood in the bilateral nares without evidence of active bleed.    Eyes: Conjunctivae are normal. No scleral icterus.   Cardiovascular:  Normal rate, regular rhythm and intact distal pulses.     Exam reveals no gallop and no friction rub.       Murmur heard.  Mechanical murmur appreciated on exam   Pulmonary/Chest: Breath sounds normal. No respiratory distress. He has no wheezes. He has no rhonchi. He has no rales.   Abdominal: Abdomen is soft. He exhibits no distension. There is no abdominal tenderness. There is no rebound and no guarding.   Musculoskeletal:         General: Edema present.      Comments: 2+ pitting edema of the bilateral lower extremities     Neurological: He is alert. GCS score is 15. GCS eye subscore is 4. GCS verbal subscore is 5. GCS motor subscore is 6.   Skin: Skin is warm and dry. There is pallor.   Psychiatric: He has a normal mood and affect. His behavior is normal. Judgment and thought content normal.         ED Course   Procedures  Labs Reviewed   CBC W/ AUTO DIFFERENTIAL - Abnormal; Notable for the following components:       Result Value    RBC 1.88 (*)     Hemoglobin 5.6 (*)     Hematocrit 18.8 (*)      (*)     MCHC 29.8 (*)     RDW 15.2 (*)     All other components within normal limits    Narrative:       hgb and hct critical result(s) called and verbal readback obtained   from arvind mendez rn by [facility] 05/29/2024 21:41   COMPREHENSIVE METABOLIC PANEL - Abnormal; Notable for the following components:    Chloride 111 (*)     CO2 18 (*)     BUN 63 (*)     Creatinine 3.8 (*)     Albumin 3.2 (*)     eGFR 15.1 (*)     All other components within normal limits   PROTIME-INR - Abnormal; Notable for the following components:    Prothrombin Time 78.6 (*)     INR 8.2 (*)     All other components within normal limits    Narrative:     DR. CHAVEZ BAHENA acknowledged and accepted results on test(s)  INR   via secure chat.  by [facility] 05/29/2024 21:46   B-TYPE NATRIURETIC PEPTIDE - Abnormal; Notable for the following components:    BNP 1,073 (*)     All other components within normal limits   TROPONIN I - Abnormal; Notable for the following components:    Troponin I 0.034 (*)     All other components within normal limits   URINALYSIS, REFLEX TO URINE CULTURE   TYPE & SCREEN   PREPARE RBC SOFT          Imaging Results              X-Ray Chest AP Portable (Final result)  Result time 05/29/24 23:11:02      Final result by Bernardo Bianchi MD (05/29/24 23:11:02)                   Impression:      See above comments.      Electronically signed by: Bernardo Bianchi  Date:    05/29/2024  Time:    23:11               Narrative:    EXAMINATION:  XR CHEST AP PORTABLE    CLINICAL HISTORY:  leg swelling;    TECHNIQUE:  Single frontal view of the chest was performed.    COMPARISON:  12/15/2023    FINDINGS:  Cardiac silhouette is mildly enlarged.  Sternotomy wires are present.  Remote rib fractures posteriorly on the right.  Possible trace bibasilar pleural effusions.  No evidence of pneumothorax.  No acute osseous abnormality.    No significant change.                                       Medications   0.9%  NaCl infusion (for blood administration) (has no administration in time range)   sodium chloride 0.9% bolus 500 mL 500 mL (has no administration in time range)   allopurinol split tablet 100 mg (has no administration in time range)   amLODIPine tablet 5 mg (has no administration in time range)   ferrous gluconate tablet 324 mg (has no administration in time range)   isosorbide mononitrate 24 hr tablet 60 mg (has no administration in time range)   metoprolol succinate (TOPROL-XL) 24 hr tablet 25 mg (has no administration in time range)   atorvastatin tablet 40 mg (has no administration in time range)   sodium chloride 0.65 % nasal spray 2 spray (has no administration in time range)   traZODone tablet 50 mg (has no  administration in time range)   oxymetazoline 0.05 % nasal spray 2 spray (has no administration in time range)   sodium chloride 0.9% flush 10 mL (has no administration in time range)   naloxone 0.4 mg/mL injection 0.02 mg (has no administration in time range)   glucose chewable tablet 16 g (has no administration in time range)   glucose chewable tablet 24 g (has no administration in time range)   glucagon (human recombinant) injection 1 mg (has no administration in time range)   acetaminophen tablet 650 mg (has no administration in time range)   ondansetron injection 4 mg (has no administration in time range)   dextrose 10% bolus 125 mL 125 mL (has no administration in time range)   dextrose 10% bolus 250 mL 250 mL (has no administration in time range)   albuterol-ipratropium 2.5 mg-0.5 mg/3 mL nebulizer solution 3 mL (has no administration in time range)   phytonadione (vitamin K1) tablet 5 mg (5 mg Oral Given 5/29/24 2235)     Medical Decision Making  Dariel Urbina  is a 83 y.o. male, presenting with complaints of anemia requiring transfusion, history of present illness obtained from patient wife at bedside as seen above. At time of initial exam patient resting comfortably in bed, able to provide adequate history of present illness and tolerated physical exam well. Patient found to be well appearing no acute distress or fatigue, stable. Exam notable as above.     Pt has known anemia from PCP concerning for requiring for blood transfusion. He has required blood transfusion in the past for bleeding on Warfarin, he does however have a mechanical heart valve and cannot stop Warfarin. CXR showed remote rib fractures posteriorly on the right with possible trace bibasilar pleural effusions. His hgb today is 5.6, he is getting one unit blood transfused over 4 hours. He has new JIM today and BNP >1000.     Data Reviewed/Counseling: I have reviewed the patient's vital signs, nursing notes, and other relevant  tests/information. Any incidental findings were discussed with the patient. I had a detailed discussion with the patient regarding the historical points, exam findings, and any diagnostic results supporting the diagnosis. Discussed case with Hospital Medicine, who agreed to evaluate and admit patient.  Disposition: Admitted     Amount and/or Complexity of Data Reviewed  Labs: ordered. Decision-making details documented in ED Course.  Radiology: ordered.    Risk  Prescription drug management.  Decision regarding hospitalization.               ED Course as of 05/30/24 0113   Wed May 29, 2024   1855 EKG 12-lead  No STEMI, slow afib with multiple PVCs [AC]   2205 Creatinine(!): 3.8  JIM [NN]   2205 Hemoglobin(!!): 5.6  Baseline 8 [NN]   2205 INR(!!): 8.2 [NN]   2206 On warfarin for AFib [NN]   2218 Patient is an 83-year-old male on warfarin for mechanical heart valve, AFib who presents for low H&H, elevated INR and epistaxis.  Patient reports that he has frequent daily nosebleeds but today he has had epistaxis for most of the day.  It has resolved just prior to arrival.  His INR is supratherapeutic in his H&H is low.  He denies any melena or hematochezia.  No other signs of bleeding other than epistaxis.  Patient does report feeling fatigued and mildly short of breath.  He reports increased ankle swelling.  No chest pain.  No abdominal pain or swelling. [NN]   2220 On exam, patient was hemodynamically stable.  He was bradycardic, irregularly irregular rhythm.  2+ pitting edema in his ankles bilaterally.  Lungs clear.  No hypoxia.  Nonlabored breathing.  Abdomen is soft, nontender.  Nares with dried blood bilaterally but no nasal septal hematoma. [NN]   2221 Suspect epistaxis from supratherapeutic INR.  INR here is 8.2.  Patient also has new JIM in his not been urinating as much on his home diuretics which is likely contributing to his increased leg swelling.  He is anemic.  His baseline is 8 in his hemoglobin here today  is 5.6.  No active bleeding but will go ahead and give 5 mg of vitamin K and transfused at a slow rate given concerns for his volume overload.  Will start with 1 unit.  Patient will require admission. [NN]   2222 EKG with slow AFib, rate 51, left bundle-branch block present but seen on prior, no STEMI [NN]      ED Course User Index  [AC] Patrice Hernandez, DO  [NN] Yani Jack MD                           Clinical Impression:  Final diagnoses:  [R07.89] Chest discomfort  [D64.9] Anemia          ED Disposition Condition    Admit                 Amanda Hernandez MD  Resident  05/30/24 [id number]

## 2024-05-30 NOTE — NURSING
Nurses Note -- 4 Eyes      5/30/2024   3:25 AM      Skin assessed during: Transfer      [x] No Altered Skin Integrity Present    []Prevention Measures Documented      [] Yes- Altered Skin Integrity Present or Discovered   [] LDA Added if Not in Epic (Describe Wound)   [] New Altered Skin Integrity was Present on Admit and Documented in LDA   [] Wound Image Taken    Wound Care Consulted? No    Attending Nurse:  Akua Glez RN     Second RN/Staff Member:  Kasia Hoffmann RN

## 2024-05-30 NOTE — ASSESSMENT & PLAN NOTE
Creatine stable for now. BMP reviewed- noted Estimated Creatinine Clearance: 15.2 mL/min (A) (based on SCr of 3.6 mg/dL (H)). according to latest data. 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  Monitor UOP and serial BMP and adjust therapy as needed. Renally dose meds. Avoid nephrotoxic medications and procedures.

## 2024-05-30 NOTE — ASSESSMENT & PLAN NOTE
Patient with known CAD s/p stent placement and CABG, which is controlled Will continue  beta blocker and Statin and monitor for S/Sx of angina/ACS. Continue to monitor on telemetry.

## 2024-05-30 NOTE — SUBJECTIVE & OBJECTIVE
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    Bilateral carotid artery disease 02/09/2017    Bleeding from the nose     Bleeding nose 03/21/2018    Cancer     Cataract     CHF (congestive heart failure)     CKD (chronic kidney disease) stage 3, GFR 30-59 ml/min 05/27/2015    Claudication of left lower extremity 09/17/2014    Colon polyp     Encounter for blood transfusion     Gastroesophageal reflux disease without esophagitis 03/19/2018    Gastrointestinal hemorrhage associated with intestinal diverticulosis 04/01/2018    Glaucoma     H/O mechanical aortic valve replacement 09/17/2014    History of gout 09/26/2012    Hyperparathyroidism due to renal insufficiency 07/27/2015    Internal hemorrhoid 04/03/2018    Long term current use of anticoagulant therapy 09/26/2012    Mechanical heart valve present     Metabolic acidosis with normal anion gap and bicarbonate losses 03/20/2018    Mixed hyperlipidemia 09/26/2012    NSTEMI (non-ST elevated myocardial infarction) 03/21/2018    Obesity, diabetes, and hypertension syndrome 02/23/2016    Paroxysmal atrial fibrillation 02/06/2023    PVD (peripheral vascular disease) 09/11/2012    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       Past Surgical History:   Procedure Laterality Date    BACK SURGERY      CARDIAC CATHETERIZATION      CARDIAC VALVE REPLACEMENT      CARDIAC VALVE  SURGERY      CARPAL TUNNEL RELEASE Right 05/19/2020    Procedure: RELEASE, CARPAL TUNNEL;  Surgeon: Rupesh Norris Jr., MD;  Location: [facility];  Service: Plastics;  Laterality: Right;    CATARACT EXTRACTION Left 11/13/2022        COLON SURGERY      COLONOSCOPY N/A 03/31/2017    Procedure: COLONOSCOPY;  Surgeon: Bruno Raymond MD;  Location: [facility] ENDO (4TH FLR);  Service: Endoscopy;  Laterality: N/A;  Patient's wife requesting date.    COLONOSCOPY N/A 04/03/2018    Procedure: COLONOSCOPY;  Surgeon: Bonifacio Pelletier MD;  Location: [facility] ENDO (2ND FLR);  Service: Endoscopy;  Laterality: N/A;    COLONOSCOPY N/A 08/13/2018    Procedure: COLONOSCOPY;  Surgeon: Kam Barba MD;  Location: [facility] ENDO (2ND FLR);  Service: Endoscopy;  Laterality: N/A;  2nd floor: PA pressure 49; hx of moderate-severe valve disease     per Coumadin clinic-Patient can hold 5 days with lovenox bridge       ok to schedule per Katarina    CORONARY ANGIOGRAPHY N/A 10/04/2021    Procedure: Left heart cath +/- peripheral angiogram;  Surgeon: Jose Ruiz MD;  Location: [facility] CATH LAB;  Service: Cardiology;  Laterality: N/A;    CORONARY ANGIOGRAPHY N/A 3/2/2023    Procedure: Angiogram, Coronary;  Surgeon: Devon Garnica MD;  Location: [facility] CATH LAB;  Service: Cardiology;  Laterality: N/A;    CORONARY ANGIOPLASTY      CORONARY ARTERY BYPASS GRAFT      CORONARY BYPASS GRAFT ANGIOGRAPHY  10/04/2021    Procedure: Bypass graft study;  Surgeon: Jose Ruiz MD;  Location: [facility] CATH LAB;  Service: Cardiology;;    CORONARY BYPASS GRAFT ANGIOGRAPHY  3/2/2023    Procedure: Bypass graft study;  Surgeon: Devon Garnica MD;  Location: [facility] CATH LAB;  Service: Cardiology;;    ECHOCARDIOGRAM,TRANSESOPHAGEAL N/A 4/14/2023    Procedure: Transesophageal echo (KIRSTEN) intra-procedure log documentation;  Surgeon: Thuan Diagnostic Provider;  Location: [facility] EP LAB;  Service: Cardiology;  Laterality: N/A;    ENDOSCOPIC NASAL CAUTERIZATION Bilateral  11/3/2023    Procedure: CAUTERIZATION, NOSE, ENDOSCOPIC;  Surgeon: Jono Russell MD;  Location: [facility] OR 2ND FLR;  Service: ENT;  Laterality: Bilateral;    ENDOSCOPIC NASAL CAUTERIZATION N/A 12/18/2023    Procedure: CAUTERIZATION, NOSE, ENDOSCOPIC;  Surgeon: Jono Russell MD;  Location: [facility] OR 2ND FLR;  Service: ENT;  Laterality: N/A;    EYE SURGERY      FESS, WITH IMAGING GUIDANCE Left 2/28/2024    Procedure: FESS, WITH IMAGING GUIDANCE;  Surgeon: Jono Russell MD;  Location: [facility] OR 2ND FLR;  Service: ENT;  Laterality: Left;  Scan loaded ENT [employer]tronic. CL    FUNCTIONAL ENDOSCOPIC SINUS SURGERY (FESS) Bilateral 6/14/2023    Procedure: FESS (FUNCTIONAL ENDOSCOPIC SINUS SURGERY);  Surgeon: Jono Russell MD;  Location: [facility] OR 2ND FLR;  Service: ENT;  Laterality: Bilateral;    HERNIA REPAIR      INTRAOCULAR PROSTHESES INSERTION Left 11/13/2022    Procedure: INSERTION, IOL PROSTHESIS;  Surgeon: Alia Mckeon MD;  Location: [facility] OR 1ST FLR;  Service: Ophthalmology;  Laterality: Left;    INTRAOCULAR PROSTHESES INSERTION Right 12/04/2022    Procedure: INSERTION, IOL PROSTHESIS;  Surgeon: Alia Mckeon MD;  Location: [facility] OR 1ST FLR;  Service: Ophthalmology;  Laterality: Right;    LIGATION, ARTERY, ETHMOIDAL Left 2/28/2024    Procedure: LIGATION, ARTERY, ETHMOIDAL;  Surgeon: Jono Russell MD;  Location: [facility] OR 2ND FLR;  Service: ENT;  Laterality: Left;    LIGATION, ARTERY, SPHENOPALATINE, ENDOSCOPIC Bilateral 6/14/2023    Procedure: LIGATION, ARTERY, SPHENOPALATINE, ENDOSCOPIC;  Surgeon: Jono Russell MD;  Location: [facility] OR 2ND FLR;  Service: ENT;  Laterality: Bilateral;    PHACOEMULSIFICATION OF CATARACT Left 11/13/2022    Procedure: PHACOEMULSIFICATION, CATARACT;  Surgeon: Alia Mckeon MD;  Location: [facility] OR 1ST FLR;  Service: Ophthalmology;  Laterality: Left;    PHACOEMULSIFICATION OF CATARACT Right 12/04/2022    Procedure: PHACOEMULSIFICATION, CATARACT;  Surgeon: Alia Mckeon  MD;  Location: [facility] OR [facility] FLR;  Service: Ophthalmology;  Laterality: Right;    SPINE SURGERY      TRANSESOPHAGEAL ECHOCARDIOGRAPHY N/A 3/22/2023    Procedure: ECHOCARDIOGRAM, TRANSESOPHAGEAL;  Surgeon: [facility] Diagnostic Provider;  Location: [facility] EP LAB;  Service: Anesthesiology;  Laterality: N/A;    TRANSESOPHAGEAL ECHOCARDIOGRAPHY N/A 4/11/2023    Procedure: ECHOCARDIOGRAM, TRANSESOPHAGEAL;  Surgeon: [facility] Diagnostic Provider;  Location: [facility] EP LAB;  Service: Anesthesiology;  Laterality: N/A;    VASECTOMY         Review of patient's allergies indicates:   Allergen Reactions    Lisinopril     Losartan      Intolerance- elevates potassium level       Current Facility-Administered Medications on File Prior to Encounter   Medication    ceFAZolin 2 g in dextrose 5 % in water (D5W) 50 mL IVPB (MB+)    HYDROmorphone injection 0.2 mg    sodium chloride 0.9% flush 10 mL     Current Outpatient Medications on File Prior to Encounter   Medication Sig    acetaminophen (TYLENOL) 500 MG tablet Take 500 mg by mouth daily as needed for Pain.    allopurinoL (ZYLOPRIM) 100 MG tablet Take 1 tablet (100 mg total) by mouth once daily.    amLODIPine (NORVASC) 5 MG tablet Take 1 tablet (5 mg total) by mouth once daily.    bumetanide (BUMEX) 1 MG tablet Take 1 tablet (1 mg total) by mouth every other day.    ferrous gluconate (FERGON) 324 MG tablet TAKE 1 TABLET EVERY DAY WITH BREAKFAST    isosorbide mononitrate (IMDUR) 60 MG 24 hr tablet TAKE 1 TABLET EVERY EVENING    metoprolol succinate (TOPROL-XL) 25 MG 24 hr tablet Take 1 tablet (25 mg total) by mouth once daily.    omega-3 fatty acids/fish oil (FISH OIL-OMEGA-3 FATTY ACIDS) 300-1,000 mg capsule Take 1 capsule by mouth once daily.    rosuvastatin (CRESTOR) 40 MG Tab TAKE 1 TABLET EVERY EVENING.    sodium chloride (SALINE NASAL) 0.65 % nasal spray 2 sprays by Nasal route 3 (three) times daily.    traZODone (DESYREL) 50 MG tablet Take 1 tablet (50 mg total) by mouth every evening.     warfarin (COUMADIN) 5 MG tablet TAKE 1/2 TABLET (2.5MG) SATURDAY, THEN TAKE 1 TABLET (5MG) ALL OTHER DAYS OR AS INSTRUCTED BY COUMADIN CLINIC     Family History       Problem Relation (Age of Onset)    Alcohol abuse Father    Diabetes Brother    Heart disease Mother, Father, Brother, Sister    Heart failure Mother, Father, Brother    No Known Problems Sister, Maternal Grandmother, Maternal Grandfather, Paternal Grandmother, Paternal Grandfather, Maternal Aunt, Maternal Uncle, Paternal Aunt, Paternal Uncle          Tobacco Use    Smoking status: Former     Current packs/day: 0.00     Average packs/day: 1 pack/day for 20.0 years (20.0 ttl pk-yrs)     Types: Cigarettes     Start date: 1960     Quit date: 1980     Years since quittin.7     Passive exposure: Never    Smokeless tobacco: Never   Substance and Sexual Activity    Alcohol use: No    Drug use: No    Sexual activity: Not Currently     Partners: Female     Review of Systems   Constitutional:  Positive for appetite change, fatigue and unexpected weight change. Negative for activity change, chills, diaphoresis and fever.   HENT:  Positive for nosebleeds. Negative for congestion, dental problem, drooling, ear discharge, ear pain, facial swelling, hearing loss, mouth sores, postnasal drip, rhinorrhea, sinus pressure, sneezing, sore throat, tinnitus, trouble swallowing and voice change.    Eyes:  Negative for photophobia, pain, discharge, redness, itching and visual disturbance.   Respiratory:  Negative for apnea, cough, choking, chest tightness, shortness of breath, wheezing and stridor.    Cardiovascular:  Positive for leg swelling. Negative for chest pain and palpitations.   Gastrointestinal:  Negative for abdominal distention, abdominal pain, anal bleeding, blood in stool, constipation, diarrhea, nausea, rectal pain and vomiting.   Endocrine: Negative for cold intolerance, heat intolerance, polydipsia, polyphagia and polyuria.   Genitourinary:   Negative for decreased urine volume, difficulty urinating, dysuria, enuresis, flank pain, frequency, genital sores, hematuria, penile discharge, penile pain, penile swelling, scrotal swelling, testicular pain and urgency.   Musculoskeletal:  Negative for arthralgias, back pain, gait problem, joint swelling, myalgias, neck pain and neck stiffness.   Skin:  Negative for color change, pallor, rash and wound.   Allergic/Immunologic: Negative for environmental allergies, food allergies and immunocompromised state.   Neurological:  Negative for dizziness, tremors, seizures, syncope, facial asymmetry, speech difficulty, weakness, light-headedness, numbness and headaches.   Hematological:  Negative for adenopathy. Does not bruise/bleed easily.   Psychiatric/Behavioral:  Negative for agitation, behavioral problems, confusion, decreased concentration, dysphoric mood, hallucinations, self-injury, sleep disturbance and suicidal ideas. The patient is not nervous/anxious and is not hyperactive.      Objective:     Vital Signs (Most Recent):  Temp: 97.3 °F (36.3 °C) (05/29/24 2253)  Pulse: (!) 52 (05/29/24 2253)  Resp: 20 (05/29/24 2253)  BP: (!) 130/59 (05/29/24 2253)  SpO2: 100 % (05/29/24 2253) Vital Signs (24h Range):  Temp:  [97.3 °F (36.3 °C)-97.5 °F (36.4 °C)] 97.3 °F (36.3 °C)  Pulse:  [48-53] 52  Resp:  [16-20] 20  SpO2:  [95 %-100 %] 100 %  BP: (102-135)/(57-70) 130/59     Weight: 70.3 kg (155 lb)  Body mass index is 25.79 kg/m².     Physical Exam  Constitutional:       General: He is not in acute distress.     Appearance: He is well-developed. He is obese. He is not diaphoretic.   HENT:      Head: Normocephalic and atraumatic.      Nose: Nose normal.      Comments: Dry blood in nares.      Mouth/Throat:      Mouth: Mucous membranes are dry.      Pharynx: No oropharyngeal exudate.   Eyes:      General: No scleral icterus.     Conjunctiva/sclera: Conjunctivae normal.      Pupils: Pupils are equal, round, and reactive to  light.   Neck:      Thyroid: No thyromegaly.      Vascular: No JVD.      Trachea: No tracheal deviation.   Cardiovascular:      Rate and Rhythm: Normal rate and regular rhythm.      Heart sounds: Normal heart sounds. No murmur heard.  Pulmonary:      Effort: Pulmonary effort is normal. No respiratory distress.      Breath sounds: Rhonchi present. No wheezing or rales.   Chest:      Chest wall: No tenderness.   Abdominal:      General: Bowel sounds are normal. There is no distension.      Palpations: Abdomen is soft. There is no mass.      Tenderness: There is no abdominal tenderness. There is no guarding or rebound.   Musculoskeletal:         General: No tenderness. Normal range of motion.      Cervical back: Normal range of motion and neck supple.      Right lower leg: Edema present.      Left lower leg: Edema present.   Lymphadenopathy:      Cervical: No cervical adenopathy.   Skin:     General: Skin is warm and dry.      Findings: No erythema or rash.   Neurological:      Mental Status: He is alert and oriented to person, place, and time.      Cranial Nerves: No cranial nerve deficit.      Motor: No abnormal muscle tone.      Coordination: Coordination normal.      Deep Tendon Reflexes: Reflexes are normal and symmetric. Reflexes normal.   Psychiatric:         Thought Content: Thought content normal.         Judgment: Judgment normal.              CRANIAL NERVES     CN III, IV, VI   Pupils are equal, round, and reactive to light.       Significant Labs: All pertinent labs within the past 24 hours have been reviewed.  Recent Lab Results         05/29/24  2240   05/29/24  2103   05/29/24  1010   05/29/24  0830        Unit Blood Type Code   9500  [P]           Unit Expiration   [id number]  [P]           Unit Blood Type   O NEG  [P]           Albumin   3.2   3.0         ALP   57   62         ALT   42   48         Anion Gap   9   9         Aniso     Slight         AST   35   49         Baso #   0.03   0.02          Basophilic Stippling     Occasional         Basophil %   0.6   0.4         BILIRUBIN TOTAL   0.4  Comment: For infants and newborns, interpretation of results should be based  on gestational age, weight and in agreement with clinical  observations.    Premature Infant recommended reference ranges:  Up to 24 hours.............<8.0 mg/dL  Up to 48 hours............<12.0 mg/dL  3-5 days..................<15.0 mg/dL  6-29 days.................<15.0 mg/dL     0.3  Comment: For infants and newborns, interpretation of results should be based  on gestational age, weight and in agreement with clinical  observations.    Premature Infant recommended reference ranges:  Up to 24 hours.............<8.0 mg/dL  Up to 48 hours............<12.0 mg/dL  3-5 days..................<15.0 mg/dL  6-29 days.................<15.0 mg/dL           BNP 1,073  Comment: Values of less than 100 pg/ml are consistent with non-CHF populations.             BUN   63   56         [employer]/Echinocytes     Moderate         Calcium   9.3   9.8         Chloride   111   112         Cholesterol Total     96  Comment: The National Cholesterol Education Program (NCEP) has set the  following guidelines (reference ranges) for Cholesterol:  Optimal.....................<200 mg/dL  Borderline High.............200-239 mg/dL  High........................> or = 240 mg/dL           CO2   18   18         CODING SYSTEM   [id number]  [P]           Creatinine   3.8   3.8         Crossmatch Interpretation   Compatible  [P]           Differential Method   Automated   Automated         DISPENSE STATUS   ISSUED  [P]           Dohle Bodies     Present         eGFR   15.1   15.1         Eos #   0.1   0.1         Eos %   1.2   1.7         Estimated Avg Glucose     111         Fragmented Cells     Moderate         Giant Platelets     Present         Glucose   101   122         Gran # (ANC)   3.4   3.8         Gran %   67.1   72.0         Group & Rh   O NEG           HDL     31  Comment:  The National Cholesterol Education Program (NCEP) has set the  following guidelines (reference values) for HDL Cholesterol:  Low...............<40 mg/dL  Optimal...........>60 mg/dL           HDL/Cholesterol Ratio     32.3         Hematocrit   18.8  Comment: hgb and hct critical result(s) called and verbal readback obtained   from arvind mendez rn by [facility] 05/29/2024 21:41     19.8  Comment: hgb and hct   critical result(s) called and verbal readback obtained   from lisa avery rn  by [facility] 05/29/2024 17:04           Hemoglobin   5.6  Comment: hgb and hct critical result(s) called and verbal readback obtained   from arvind mendez rn by [facility] 05/29/2024 21:41     5.9  Comment: hgb and hct   critical result(s) called and verbal readback obtained   from lisa avery rn  by [facility] 05/29/2024 17:04           Hemoglobin A1C External     5.5  Comment: ADA Screening Guidelines:  5.7-6.4%  Consistent with prediabetes  >or=6.5%  Consistent with diabetes    High levels of fetal hemoglobin interfere with the HbA1C  assay. Heterozygous hemoglobin variants (HbS, HgC, etc)do  not significantly interfere with this assay.   However, presence of multiple variants may affect accuracy.           Hypo     Occasional         Immature Grans (Abs)   0.02  Comment: Mild elevation in immature granulocytes is non specific and   can be seen in a variety of conditions including stress response,   acute inflammation, trauma and pregnancy. Correlation with other   laboratory and clinical findings is essential.     0.02  Comment: Mild elevation in immature granulocytes is non specific and   can be seen in a variety of conditions including stress response,   acute inflammation, trauma and pregnancy. Correlation with other   laboratory and clinical findings is essential.           Immature Granulocytes   0.4   0.4         INDIRECT CRISTIANO   NEG           INR   8.2  Comment: Coumadin Therapy:  2.0 - 3.0 for INR for all indicators except  mechanical heart valves  and antiphospholipid syndromes which should use 2.5 - 3.5.  DR. CHAVEZ BAHENA acknowledged and accepted results on test(s) INR   via secure chat.  by [facility] 05/29/2024 21:46       7.4  Comment: Coumadin Therapy:  2.0 - 3.0 for INR for all indicators except mechanical heart valves  and antiphospholipid syndromes which should use 2.5 - 3.5.  INR critical result(s) called and verbal readback obtained from DR. BREANNE BURNS MA. by [facility] 05/29/2024 16:13         LDL Cholesterol     36.2  Comment: The National Cholesterol Education Program (NCEP) has set the  following guidelines (reference values) for LDL Cholesterol:  Optimal.......................<130 mg/dL  Borderline High...............130-159 mg/dL  High..........................160-189 mg/dL  Very High.....................>190 mg/dL           Lymph #   1.1   0.8         Lymph %   21.0   15.6         MCH   29.8   30.9         MCHC   29.8   29.8         MCV   100   104         Mono #   0.5   0.5         Mono %   9.7   9.9         MPV   10.8   11.4         Non-HDL Cholesterol     65  Comment: Risk category and Non-HDL cholesterol goals:  Coronary heart disease (CHD)or equivalent (10-year risk of CHD >20%):  Non-HDL cholesterol goal     <130 mg/dL  Two or more CHD risk factors and 10-year risk of CHD <= 20%:  Non-HDL cholesterol goal     <160 mg/dL  0 to 1 CHD risk factor:  Non-HDL cholesterol goal     <190 mg/dL           nRBC   0   0         Ovalocytes     Occasional         Platelet Estimate     Appears normal         Platelet Count   188   194         Poikilocytosis     Moderate         Poly     Occasional         Potassium   4.8   4.6         Product Code   [id number]  [P]           PROTEIN TOTAL   6.4   6.3         PT   78.6     71.5       RBC   1.88   1.91         RDW   15.2   15.3         Schistocytes     Present         Sodium   138   139         Specimen Outdate   06/01/2024 23:59           Target Cells     Occasional         Total  Cholesterol/HDL Ratio     3.1         Toxic Granulation     Present         Triglycerides     144  Comment: The National Cholesterol Education Program (NCEP) has set the  following guidelines (reference values) for triglycerides:  Normal......................<150 mg/dL  Borderline High.............150-199 mg/dL  High........................200-499 mg/dL           Troponin I 0.034  Comment: The reference interval for Troponin I represents the 99th percentile   cutoff   for our facility and is consistent with 3rd generation assay   performance.               UNIT NUMBER   [id number]  [P]           Vacuolated Granulocytes     Present         WBC   5.04   5.26                  [P] - Preliminary Result               Significant Imaging: I have reviewed all pertinent imaging results/findings within the past 24 hours.

## 2024-05-30 NOTE — CONSULTS
Food & Nutrition  Education    Diet Education: Low sodium, Fluid restriction   Time Spent: 4 minutes   Learners: Pt     Nutrition Education provided with handouts:   Discussed fluid and salt restriction. Handouts provided     All questions and concerns answered. Dietitian's contact information provided.       Follow-Up: Yes     Please Re-consult as needed        Thanks!

## 2024-05-30 NOTE — ASSESSMENT & PLAN NOTE
Patient with Hypoxic Respiratory failure which is Acute.  he is not on home oxygen. Supplemental oxygen was provided and noted-      .   Signs/symptoms of respiratory failure include- tachypnea, increased work of breathing, and wheezing. Contributing diagnoses includes - CHF Labs and images were reviewed. Patient Has not had a recent ABG. Will treat underlying causes and adjust management of respiratory failure as follows-   -received 1 unit blood and 500 cc NS bolus   -developed acute dyspnea and hypoxia during 2 nd unit blood transfusion (held)  CXR showed increased pulmonary edema   -lasix 40 mg IV x 1 given   -now oxygenating well on 4 liter N/C during my repeat interview and not in distress   -check echo in am

## 2024-05-30 NOTE — ASSESSMENT & PLAN NOTE
Patient with Paroxysmal (<7 days) atrial fibrillation which is controlled currently with Beta Blocker. Patient is currently in atrial fibrillation.[id number] Score: 4. HASBLED Score: . Anticoagulation indicated. Anticoagulation done with coumadin in setting of mechanical aortic valve .

## 2024-05-30 NOTE — ED TRIAGE NOTES
Dariel Urbina, a 83 y.o. male presents to the ED w/ complaint of low hgb and weakness for months. Pt reports lab work this morning showed low hgb and has been having daily nose bleeds. Pt is on Warfrin and reports nose bleeds are pretty bad. Endorses SOB with exertion and slight weakness. Reports one episode of CP pain on way to ER that has since resolved.      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    Bilateral carotid artery disease 02/09/2017    Bleeding from the nose     Bleeding nose 03/21/2018    Cancer     Cataract     CHF (congestive heart failure)     CKD (chronic kidney disease) stage 3, GFR 30-59 ml/min 05/27/2015    Claudication of left lower extremity 09/17/2014    Colon polyp     Encounter for blood transfusion     Gastroesophageal reflux disease without esophagitis 03/19/2018    Gastrointestinal hemorrhage associated with intestinal diverticulosis 04/01/2018    Glaucoma     H/O mechanical aortic valve replacement 09/17/2014    History of gout 09/26/2012    Hyperparathyroidism due to renal insufficiency 07/27/2015    Internal hemorrhoid 04/03/2018    Long term current use of anticoagulant therapy 09/26/2012    Mechanical heart valve present     Metabolic acidosis with normal anion gap and bicarbonate losses 03/20/2018    Mixed hyperlipidemia 09/26/2012    NSTEMI (non-ST elevated myocardial infarction) 03/21/2018     Obesity, diabetes, and hypertension syndrome 02/23/2016    Paroxysmal atrial fibrillation 02/06/2023    PVD (peripheral vascular disease) 09/11/2012    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

## 2024-05-30 NOTE — NURSING
Patient admitted to CSU from ED with unit of blood infusing. No evidence of distress; patient AAO x4.  Patient placed on tele. VSS. Pt assessed. Patient voices no complaints at this time. Plan of care initiated with patient. Bed in lowest position, locked, SR up x2, call bell in reach.

## 2024-05-30 NOTE — ASSESSMENT & PLAN NOTE
-long standing with daily epistaxis while on coumadin   -seen by ENT with multiple ligation and cauterization (last on 5/21 during clinic visit)  -no active bleeding now but high risk for recurrence   -will consult ENT   -afrin prn   -advised not to blow nose and sneeze with mouth open

## 2024-05-30 NOTE — PLAN OF CARE
[facility] - Cardiology Stepdown  Initial Discharge Assessment       Primary Care Provider: Devon Langston Jr., MD    Admission Diagnosis: Chest discomfort [R07.89]  Anemia [D64.9]  Chest pain [R07.9]    Admission Date: 5/29/2024  Expected Discharge Date: 6/1/2024    Transition of Care Barriers: None    Payor: HU[employer] MANAGED MEDICARE / Plan: HU[employer] MEDICARE HMO / Product Type: Capitation /     Extended Emergency Contact Information  Primary Emergency Contact: Ameena Urbina  Address: [address] [place], [place] [address], [place] [address] [facility]  Home Phone: [phone]  Mobile Phone: [phone]  Relation: Spouse    Discharge Plan A: Home, Home with family, Home Health  Discharge Plan B: Home, Home with family      [facility] - [place], [place] - [address] [facility]  [address] [address]  Phone: [phone] Fax: [phone]    [facility]  [address] [address]  Phone: [phone] Fax: [phone]    [facility] #[id number] - [place], [place] - [address]  [place] [address]  Phone: [phone] Fax: [phone]      Initial Assessment (most recent)       Adult Discharge Assessment - 05/30/24 1117          Discharge Assessment    Assessment Type Discharge Planning Assessment     Confirmed/corrected address, phone number and insurance Yes     Confirmed Demographics Correct on Facesheet     Source of Information patient     Communicated ASTER with patient/caregiver Yes     Reason For Admission SOB     People in Home spouse     Do you expect to return to your current living situation? Yes     Do you have help at home or someone to help you manage your care at home? Yes     Who are your caregiver(s) and their phone number(s)? Ameena Urbina - spouse - [phone]     Prior to hospitilization cognitive status: Alert/Oriented     Current cognitive status: Alert/Oriented     Walking or Climbing Stairs Difficulty yes     Walking or  Climbing Stairs ambulation difficulty, requires equipment     Dressing/Bathing Difficulty no     Equipment Currently Used at Home walker, rolling     Readmission within 30 days? No     Patient currently being followed by outpatient case management? No     Do you currently have service(s) that help you manage your care at home? No     Do you take prescription medications? Yes     Do you have prescription coverage? Yes     Do you have any problems affording any of your prescribed medications? No     Is the patient taking medications as prescribed? yes     Who is going to help you get home at discharge? Ameena Urbina - spouse - [phone]     How do you get to doctors appointments? car, drives self;family or friend will provide     Are you on dialysis? No     Do you take coumadin? No     Discharge Plan A Home;Home with family;Home Health     Discharge Plan B Home;Home with family     DME Needed Upon Discharge  none     Discharge Plan discussed with: Patient     Transition of Care Barriers None        Physical Activity    On average, how many days per week do you engage in moderate to strenuous exercise (like a brisk walk)? 0 days     On average, how many minutes do you engage in exercise at this level? 0 min        Financial Resource Strain    How hard is it for you to pay for the very basics like food, housing, medical care, and heating? Not hard at all        Housing Stability    In the last 12 months, was there a time when you were not able to pay the mortgage or rent on time? No     At any time in the past 12 months, were you homeless or living in a shelter (including now)? No        Transportation Needs    Has the lack of transportation kept you from medical appointments, meetings, work or from getting things needed for daily living? No        Food Insecurity    Within the past 12 months, you worried that your food would run out before you got the money to buy more. Never true     Within the past 12 months, the  food you bought just didn't last and you didn't have money to get more. Never true        Stress    Do you feel stress - tense, restless, nervous, or anxious, or unable to sleep at night because your mind is troubled all the time - these days? Only a little        Social Isolation    How often do you feel lonely or isolated from those around you?  Never        Alcohol Use    Q1: How often do you have a drink containing alcohol? Never     Q2: How many drinks containing alcohol do you have on a typical day when you are drinking? Patient does not drink     Q3: How often do you have six or more drinks on one occasion? Never        Utilities    In the past 12 months has the electric, gas, oil, or water company threatened to shut off services in your home? No        Health Literacy    How often do you need to have someone help you when you read instructions, pamphlets, or other written material from your doctor or pharmacy? Never                   Completed initial assessment with the patient. Family will provide transport home. No to her needs noted upon D/C. Will continue to follow and offer support as needed.  Discharge Plan A and Plan B have been determined by review of patient's clinical status, future medical and therapeutic needs, and coverage/benefits for post-acute care in coordination with multidisciplinary team members.  Kye Davila, [facility]    [facility]  [phone]

## 2024-05-30 NOTE — CARE UPDATE
Patient reports no further epistaxis. Hb stable at 7.1 s/p 1 unit of pRBC. Did not received second unit of pRBC due to becoming short of breath from volume overload. Since Hb is >7 now, will hold off on further transfusions. Pharmacy consulted for managing Coumadin. Rec holding further coumadin until INR <3. Per ENT, No interventions required at this time. Recommend against any instrumentation of the nose  Recommend against nasal cannula - if patient requires supp O2, recommend humidified O2 via face tent or mask. For further bleeding, recommend liberally spraying Afrin and holding manual pressure for 10 min. For further bleeding, page ENT on call. May require angiography and poss emobilization w IR as salvage treatment. Due to presence of mechanical AV patient will need to be on coumadin and can't be switched to DOACs. Will likely benefit from close monitoring with coumadin clinic and coumadin diet on d.c.

## 2024-05-30 NOTE — NURSING
0455: pt reported new onset SOB 30 min after starting blood transfusion. Blood stopped. Pt started on 2L nc. RUPAL Love notified. Stated to stop blood,.obtain CXR, and give Lasix IVP.    NS bolus given before second unit of blood per MAR. RN asked purpose for bolus prior to giving, Daksha SANTANA stated pt looks dry with JIM.

## 2024-05-30 NOTE — SUBJECTIVE & OBJECTIVE
Medications:  Continuous Infusions:  Scheduled Meds:   allopurinoL  100 mg Oral Daily    amLODIPine  5 mg Oral Daily    atorvastatin  40 mg Oral Daily    ferrous gluconate  324 mg Oral Daily with breakfast    furosemide (LASIX) injection  40 mg Intravenous Q12H    isosorbide mononitrate  60 mg Oral QHS    metoprolol succinate  25 mg Oral Daily    pantoprazole  40 mg Intravenous BID    sodium chloride  2 spray Each Nostril TID     PRN Meds:  Current Facility-Administered Medications:     0.9%  NaCl infusion (for blood administration), , Intravenous, Q24H PRN    0.9%  NaCl infusion (for blood administration), , Intravenous, Q24H PRN    acetaminophen, 650 mg, Oral, Q4H PRN    albuterol-ipratropium, 3 mL, Nebulization, Q4H PRN    dextrose 10%, 12.5 g, Intravenous, PRN    dextrose 10%, 25 g, Intravenous, PRN    glucagon (human recombinant), 1 mg, Intramuscular, PRN    glucose, 16 g, Oral, PRN    glucose, 24 g, Oral, PRN    naloxone, 0.02 mg, Intravenous, PRN    ondansetron, 4 mg, Intravenous, Q8H PRN    oxymetazoline, 2 spray, Each Nostril, Q12H PRN    sodium chloride 0.9%, 10 mL, Intravenous, Q12H PRN    sodium chloride 0.9%, 10 mL, Intravenous, PRN    traZODone, 50 mg, Oral, Nightly PRN     Current Facility-Administered Medications on File Prior to Encounter   Medication    ceFAZolin 2 g in dextrose 5 % in water (D5W) 50 mL IVPB (MB+)    HYDROmorphone injection 0.2 mg    sodium chloride 0.9% flush 10 mL     Current Outpatient Medications on File Prior to Encounter   Medication Sig    acetaminophen (TYLENOL) 500 MG tablet Take 500 mg by mouth daily as needed for Pain.    allopurinoL (ZYLOPRIM) 100 MG tablet Take 1 tablet (100 mg total) by mouth once daily.    amLODIPine (NORVASC) 5 MG tablet Take 1 tablet (5 mg total) by mouth once daily.    bumetanide (BUMEX) 1 MG tablet Take 1 tablet (1 mg total) by mouth every other day.    ferrous gluconate (FERGON) 324 MG tablet TAKE 1 TABLET EVERY DAY WITH BREAKFAST    isosorbide  mononitrate (IMDUR) 60 MG 24 hr tablet TAKE 1 TABLET EVERY EVENING    metoprolol succinate (TOPROL-XL) 25 MG 24 hr tablet Take 1 tablet (25 mg total) by mouth once daily.    omega-3 fatty acids/fish oil (FISH OIL-OMEGA-3 FATTY ACIDS) 300-1,000 mg capsule Take 1 capsule by mouth once daily.    rosuvastatin (CRESTOR) 40 MG Tab TAKE 1 TABLET EVERY EVENING.    sodium chloride (SALINE NASAL) 0.65 % nasal spray 2 sprays by Nasal route 3 (three) times daily.    traZODone (DESYREL) 50 MG tablet Take 1 tablet (50 mg total) by mouth every evening.    warfarin (COUMADIN) 5 MG tablet TAKE 1/2 TABLET (2.5MG) SATURDAY, THEN TAKE 1 TABLET (5MG) ALL OTHER DAYS OR AS INSTRUCTED BY COUMADIN CLINIC       Review of patient's allergies indicates:   Allergen Reactions    Lisinopril     Losartan      Intolerance- elevates potassium level       Past Medical History:   Diagnosis Date    Anemia of chronic renal failure, stage 3 (moderate) 05/27/2015    Anticoagulant long-term use     Atherosclerosis of coronary artery bypass graft of native heart without angina pectoris 09/11/2012    3-27-18 [facility] Two vessel coronary artery disease.   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    Bilateral carotid artery disease 02/09/2017    Bleeding from the nose     Bleeding nose 03/21/2018    Cancer     Cataract     CHF (congestive heart failure)     CKD (chronic kidney disease) stage 3, GFR 30-59 ml/min 05/27/2015    Claudication of left lower extremity 09/17/2014    Colon polyp     Encounter for blood transfusion     Gastroesophageal reflux disease without esophagitis 03/19/2018    Gastrointestinal hemorrhage associated with intestinal diverticulosis 04/01/2018    Glaucoma     H/O mechanical aortic valve replacement 09/17/2014    History of gout 09/26/2012    Hyperparathyroidism due to renal insufficiency 07/27/2015    Internal hemorrhoid 04/03/2018    Long term current use of anticoagulant therapy 09/26/2012    Mechanical heart  valve present     Metabolic acidosis with normal anion gap and bicarbonate losses 03/20/2018    Mixed hyperlipidemia 09/26/2012    NSTEMI (non-ST elevated myocardial infarction) 03/21/2018    Obesity, diabetes, and hypertension syndrome 02/23/2016    Paroxysmal atrial fibrillation 02/06/2023    PVD (peripheral vascular disease) 09/11/2012    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     Past Surgical History:   Procedure Laterality Date    BACK SURGERY      CARDIAC CATHETERIZATION      CARDIAC VALVE REPLACEMENT      CARDIAC VALVE SURGERY      CARPAL TUNNEL RELEASE Right 05/19/2020    Procedure: RELEASE, CARPAL TUNNEL;  Surgeon: Rupesh Norris Jr., MD;  Location: [facility];  Service: Plastics;  Laterality: Right;    CATARACT EXTRACTION Left 11/13/2022        COLON SURGERY      COLONOSCOPY N/A 03/31/2017    Procedure: COLONOSCOPY;  Surgeon: Bruno Raymond MD;  Location: [facility] (4TH FLR);  Service: Endoscopy;  Laterality: N/A;  Patient's wife requesting date.    COLONOSCOPY N/A 04/03/2018    Procedure: COLONOSCOPY;  Surgeon: Bonifacio Pelletier MD;  Location: [facility] (2ND FLR);  Service: Endoscopy;  Laterality: N/A;    COLONOSCOPY N/A 08/13/2018    Procedure: COLONOSCOPY;  Surgeon: Kam Barba MD;  Location: [facility] (2ND FLR);  Service: Endoscopy;  Laterality: N/A;  2nd floor: PA pressure 49; hx of moderate-severe valve disease     per Coumadin clinic-Patient can hold 5 days with lovenox bridge       ok to schedule per Katarina    CORONARY ANGIOGRAPHY N/A 10/04/2021    Procedure: Left heart cath +/- peripheral angiogram;  Surgeon: Jose Ruiz MD;  Location: [facility] CATH LAB;  Service: Cardiology;   Laterality: N/A;    CORONARY ANGIOGRAPHY N/A 3/2/2023    Procedure: Angiogram, Coronary;  Surgeon: Devon Garnica MD;  Location: [facility] CATH LAB;  Service: Cardiology;  Laterality: N/A;    CORONARY ANGIOPLASTY      CORONARY ARTERY BYPASS GRAFT      CORONARY BYPASS GRAFT ANGIOGRAPHY  10/04/2021    Procedure: Bypass graft study;  Surgeon: Jose Ruiz MD;  Location: [facility] CATH LAB;  Service: Cardiology;;    CORONARY BYPASS GRAFT ANGIOGRAPHY  3/2/2023    Procedure: Bypass graft study;  Surgeon: Devon Garnica MD;  Location: [facility] CATH LAB;  Service: Cardiology;;    ECHOCARDIOGRAM,TRANSESOPHAGEAL N/A 4/14/2023    Procedure: Transesophageal echo (KIRSTEN) intra-procedure log documentation;  Surgeon: [facility] Diagnostic Provider;  Location: [facility] EP LAB;  Service: Cardiology;  Laterality: N/A;    ENDOSCOPIC NASAL CAUTERIZATION Bilateral 11/3/2023    Procedure: CAUTERIZATION, NOSE, ENDOSCOPIC;  Surgeon: Jono Russell MD;  Location: [facility] OR 2ND FLR;  Service: ENT;  Laterality: Bilateral;    ENDOSCOPIC NASAL CAUTERIZATION N/A 12/18/2023    Procedure: CAUTERIZATION, NOSE, ENDOSCOPIC;  Surgeon: Jono Russell MD;  Location: [facility] OR 2ND FLR;  Service: ENT;  Laterality: N/A;    EYE SURGERY      FESS, WITH IMAGING GUIDANCE Left 2/28/2024    Procedure: FESS, WITH IMAGING GUIDANCE;  Surgeon: Jono Russell MD;  Location: [facility] OR 2ND FLR;  Service: ENT;  Laterality: Left;  Scan loaded ENT Medtronic. CL    FUNCTIONAL ENDOSCOPIC SINUS SURGERY (FESS) Bilateral 6/14/2023    Procedure: FESS (FUNCTIONAL ENDOSCOPIC SINUS SURGERY);  Surgeon: Jono Russell MD;  Location: [facility] OR 2ND FLR;  Service: ENT;  Laterality: Bilateral;    HERNIA REPAIR      INTRAOCULAR PROSTHESES INSERTION Left 11/13/2022    Procedure: INSERTION, IOL PROSTHESIS;  Surgeon: Alia Mckeon MD;  Location: [facility] OR 1ST FLR;  Service: Ophthalmology;  Laterality: Left;    INTRAOCULAR PROSTHESES INSERTION Right 12/04/2022    Procedure: INSERTION, IOL PROSTHESIS;   Surgeon: Alia Mckeon MD;  Location: [facility] OR 1ST FLR;  Service: Ophthalmology;  Laterality: Right;    LIGATION, ARTERY, ETHMOIDAL Left 2/28/2024    Procedure: LIGATION, ARTERY, ETHMOIDAL;  Surgeon: Jono Russell MD;  Location: [facility] OR 2ND FLR;  Service: ENT;  Laterality: Left;    LIGATION, ARTERY, SPHENOPALATINE, ENDOSCOPIC Bilateral 6/14/2023    Procedure: LIGATION, ARTERY, SPHENOPALATINE, ENDOSCOPIC;  Surgeon: Jono Russell MD;  Location: [facility] OR 2ND FLR;  Service: ENT;  Laterality: Bilateral;    PHACOEMULSIFICATION OF CATARACT Left 11/13/2022    Procedure: PHACOEMULSIFICATION, CATARACT;  Surgeon: Alia Mckeon MD;  [facility];  Service: Ophthalmology;  Laterality: Left;    PHACOEMULSIFICATION OF CATARACT Right 12/04/2022    Procedure: PHACOEMULSIFICATION, CATARACT;  Surgeon: Alia Mckeon MD;  [facility];  Service: Ophthalmology;  Laterality: Right;    SPINE SURGERY      TRANSESOPHAGEAL ECHOCARDIOGRAPHY N/A 3/22/2023    Procedure: ECHOCARDIOGRAM, TRANSESOPHAGEAL;  Surgeon: [facility] Diagnostic Provider;  Location: [facility] EP LAB;  Service: Anesthesiology;  Laterality: N/A;    TRANSESOPHAGEAL ECHOCARDIOGRAPHY N/A 4/11/2023    Procedure: ECHOCARDIOGRAM, TRANSESOPHAGEAL;  Surgeon: [facility] Diagnostic Provider;  Location: [facility] EP LAB;  Service: Anesthesiology;  Laterality: N/A;    VASECTOMY       Family History       Problem Relation (Age of Onset)    Alcohol abuse Father    Diabetes Brother    Heart disease Mother, Father, Brother, Sister    Heart failure Mother, Father, Brother    No Known Problems Sister, Maternal Grandmother, Maternal Grandfather, Paternal Grandmother, Paternal Grandfather, Maternal Aunt, Maternal Uncle, Paternal Aunt, Paternal Uncle          Tobacco Use    Smoking status: Former     Current packs/day: 0.00     Average packs/day: 1 pack/day for 20.0 years (20.0 ttl pk-yrs)     Types: Cigarettes     Start date: 9/11/1960     Quit date: 9/11/1980     Years  since quittin.7     Passive exposure: Never    Smokeless tobacco: Never   Substance and Sexual Activity    Alcohol use: No    Drug use: No    Sexual activity: Not Currently     Partners: Female     Review of Systems   Constitutional:  Positive for activity change and fatigue. Negative for chills, fever and unexpected weight change.   HENT:  Positive for nosebleeds. Negative for congestion, ear discharge, ear pain, hearing loss, sore throat, trouble swallowing and voice change.    Eyes:  Negative for pain and visual disturbance.   Respiratory:  Negative for cough, shortness of breath and stridor.    Cardiovascular:  Negative for chest pain.   Gastrointestinal:  Negative for abdominal pain, nausea and vomiting.   Skin:  Negative for rash and wound.     Objective:     Vital Signs (Most Recent):  Temp: 97.5 °F (36.4 °C) (24)  Pulse: 63 (24)  Resp: 16 (24)  BP: (!) 147/66 (24)  SpO2: 95 % (24) Vital Signs (24h Range):  Temp:  [97.3 °F (36.3 °C)-97.9 °F (36.6 °C)] 97.5 °F (36.4 °C)  Pulse:  [45-63] 63  Resp:  [16-22] 16  SpO2:  [92 %-100 %] 95 %  BP: (101-156)/(49-70) 147/66     Weight: 78.1 kg (172 lb 2.9 oz)  Body mass index is 28.65 kg/m².         Physical Exam  NCAT  NAD  Breathing well on nasal cannula, no acute distress, dried bloody crusting noted anterioly  HB I/VI bilat  Hearing well at conversational volume  Euphonic  OC and OP patent, tongue soft and midline, no posterior OP bleeding  Anterior neck soft, no crepitus       Significant Labs:  BMP:   Recent Labs   Lab 24   GLU 82   *   CO2 17*   BUN 60*   CREATININE 3.6*   CALCIUM 9.4   MG 2.3     CBC:   Recent Labs   Lab 24   WBC 4.79   RBC 2.45*   HGB 7.0*   HCT 22.6*      MCV 92   MCH 28.6   MCHC 31.0*     Coagulation:   Recent Labs   Lab 24   LABPROT 70.1*   INR 7.3*   APTT 45.8*       Significant Diagnostics:  I have reviewed and interpreted all  pertinent imaging results/findings within the past 24 hours.

## 2024-05-30 NOTE — PLAN OF CARE
Problem: Adult Inpatient Plan of Care  Goal: Plan of Care Review  Outcome: Progressing  Goal: Patient-Specific Goal (Individualized)  Outcome: Progressing  Goal: Absence of Hospital-Acquired Illness or Injury  Outcome: Progressing  Goal: Optimal Comfort and Wellbeing  Outcome: Progressing  Goal: Readiness for Transition of Care  Outcome: Progressing     Problem: Diabetes Comorbidity  Goal: Blood Glucose Level Within Targeted Range  Outcome: Progressing     Problem: Fall Injury Risk  Goal: Absence of Fall and Fall-Related Injury  Outcome: Progressing     Problem: Gas Exchange Impaired  Goal: Optimal Gas Exchange  Outcome: Progressing     Problem: Skin Injury Risk Increased  Goal: Skin Health and Integrity  Outcome: Progressing   Plan of care discussed with patient. Patient is free of fall/trauma/injury. Denies CP, SOB, or pain/discomfort. All questions addressed. Will continue to monitor. AAOx4 and VSS. Pt has had no epistaxis today. Pt c/o some SOB, 2L simple face mask applied, which pt states helped a little bit. VSS. Labs being monitored.

## 2024-05-30 NOTE — PLAN OF CARE
Pt on 4L nc sats 94%. Still reporting SOB and difficulty taking deep breaths. CXR obtained. Plan of care discussed with patient. Patient is free of fall/trauma/injury. Denies CP, pain/discomfort. All questions addressed.     Problem: Adult Inpatient Plan of Care  Goal: Plan of Care Review  Outcome: Progressing  Goal: Patient-Specific Goal (Individualized)  Outcome: Progressing  Flowsheets (Taken 5/30/2024 7890)  Individualized Care Needs: supplemental O2  Anxieties, Fears or Concerns: fear of nose bleeding again     Problem: Diabetes Comorbidity  Goal: Blood Glucose Level Within Targeted Range  Outcome: Progressing     Problem: Fall Injury Risk  Goal: Absence of Fall and Fall-Related Injury  Outcome: Progressing     Problem: Acute Kidney Injury/Impairment  Goal: Fluid and Electrolyte Balance  Outcome: Progressing     Problem: Gas Exchange Impaired  Goal: Optimal Gas Exchange  Outcome: Progressing

## 2024-05-30 NOTE — ASSESSMENT & PLAN NOTE
-done in 1990s   -on chronic coumadin c/b long standing epistaxis   -takes coumadin 5 mg nightly and last taken 5/28 with goal INR 2-3 per patient   -INR elevated to 8 and received vitamin K 5 mg in ED  -holding coumadin with plan to restart after INR < 3

## 2024-05-30 NOTE — FIRST PROVIDER EVALUATION
Emergency Department TeleTriage Encounter Note      CHIEF COMPLAINT    Chief Complaint   Patient presents with    Abnormal Lab     Low hgb , been having nose bleeds but not now    Chest Pain       VITAL SIGNS   Initial Vitals [05/29/24 1848]   BP Pulse Resp Temp SpO2   (!) 134/59 (!) 53 18 97.5 °F (36.4 °C) 97 %      MAP       --            ALLERGIES    Review of patient's allergies indicates:   Allergen Reactions    Lisinopril     Losartan      Intolerance- elevates potassium level       PROVIDER TRIAGE NOTE  TeleTriage Note: Dariel Urbina, a nontoxic/well appearing, 83 y.o. male, presented to the ED with c/o called by PCP about low H/H. Daily nose bleeds. Several nose surgeries. On coumadin for a heart valve replacement. Pt is having SOB, chest pain, and dizziness upon standing/walking.    All ED beds are full at present; patient notified of this status.  Patient seen and medically screened by Nurse Practitioner via teletriage. Orders initiated at triage to expedite care.  Patient is stable to return to the waiting room and will be placed in an ED bed when available.  Care will be transferred to an alternate provider when patient has been placed in an Exam Room from the [facility] for physical exam, additional orders, and disposition.  8:28 PM Juli Sorto, DNP, FNP-C        ORDERS  Labs Reviewed   CBC W/ AUTO DIFFERENTIAL   COMPREHENSIVE METABOLIC PANEL   PROTIME-INR   TYPE & SCREEN       ED Orders (720h ago, onward)      Start Ordered     Status Ordering Provider    05/29/24 2032 05/29/24 2031  EKG 12-lead  Once         Ordered JULI SORTO    05/29/24 2031 05/29/24 2030  Protime-INR  STAT         Ordered JULI SORTO    05/29/24 2030 05/29/24 2029  Type & Screen  STAT         Ordered JULI SORTO    05/29/24 2029 05/29/24 2029  Insert peripheral IV  Continuous         Ordered JULI SORTO    05/29/24 2029 05/29/24 2029  CBC auto differential  STAT         Ordered JULI SORTO     05/29/24 2029 05/29/24 2029  Comprehensive metabolic panel  STAT         Ordered CHEPE SHIRLEYÁngela    05/29/24 1851 05/29/24 1850  EKG 12-lead  Once         Completed by KEN CARLISLE on 5/29/2024 at  6:57 PM CHIARA DURANT              Virtual Visit Note: The provider triage portion of this emergency department evaluation and documentation was performed via [id number], a HIPAA-compliant telemedicine application, in concert with a tele-presenter in the room. A face to face patient evaluation with one of my colleagues will occur once the patient is placed in an emergency department room.      DISCLAIMER: This note was prepared with [employer] voice recognition transcription software. Garbled syntax, mangled pronouns, and other bizarre constructions may be attributed to that software system.

## 2024-05-30 NOTE — HPI
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 Patient states he went to coumadin clinic earlier today for lab works and later received a call from clinic about low Hgb and elevated INR. He reports increased fatigue, generalized weakness and dyspnea on exertion over last couple of weeks. He also reports poor appetite and 15 lbs weight loss over last 2 months. He reports daily spontaneous nosebleed for many years for which he follows with ENT and had ligation and cauterization (most recent last week). ENT told patient that this is going to be life long problem given patient needs to be on coumadin for mechanical aortic valve. He reports swallowing blood drom nose bleed. Patient states nose bleed stopped this afternoon before coming to hospital w/o recurrence so far. He reports bilateral leg swelling despite taking bumex at home and he thinks diuretic is not working as well as it did in the past. He denies bleeding from any other sites. His last admission for epistaxis was in December requiring cauterization.    ED course: afebrile and vitals stable. Hgb 5.6, INR 8.2, Cr 3.6. patient received 5 mg vitamin K and 1 unit blood transfusion in ED. During my interview, patient is awake, conversant and not in distress. He is oxygenating well on room air.      KIRSTEN (4/14/23):  The left ventricle is normal in size with normal systolic function.The estimated ejection fraction is 60%.  Normal right ventricular size with normal right ventricular systolic function.  Moderate mitral regurgitation.  There is a mechanical  aortic valve present. There is no aortic insufficiency present.  Plaque present in the transverse aorta.

## 2024-05-30 NOTE — ED NOTES
Pt to ED for low H&H. Pt reports dizziness and dyspnea with any exertion. States he had continuous nose bleed this AM.    LOC: The patient is awake, alert and aware of environment with an appropriate affect, the patient is oriented x 3 and speaking appropriately.  APPEARANCE: Patient resting comfortably and in no acute distress, patient is clean and well groomed, patient's clothing is properly fastened.  SKIN: The skin is warm and dry, color pale, patient has normal skin turgor and moist mucus membranes, skin intact, no breakdown or bruising noted.  MUSCULOSKELETAL: Patient moving all extremities spontaneously, no obvious swelling or deformities noted.  RESPIRATORY: Airway is open and patent, respirations are spontaneous, patient has a normal effort and rate, no accessory muscle use noted,   CARDIAC: Patient a fib 40s-50s; states that his normal HR is usually in the 70s, denies CP, no periphreal edema noted, capillary refill < 3 seconds.  ABDOMEN: Soft and non tender to palpation, no distention noted, normoactive bowel sounds present in all four quadrants.  NEUROLOGIC:  facial expression is symmetrical, patient moving all extremities spontaneously, normal sensation in all extremities when touched with a finger.  Follows all commands appropriately.

## 2024-05-30 NOTE — ASSESSMENT & PLAN NOTE
Advance Care Planning     Date: 05/30/2024    [facility]  I engaged the patient in a voluntary conversation about advance care planning and we specifically addressed what the goals of care would be moving forward, in light of the patient's change in clinical status, specifically if his heart or breathing stops.  We did specifically address the patient's likely prognosis, which is fair .  We explored the patient's values and preferences for future care.  The patient endorses that what is most important right now is to focus on remaining as independent as possible, symptom/pain control, and extending life as long as possible, even it it means sacrificing quality    Accordingly, we have decided that the best plan to meet the patient's goals includes continuing with treatment and Full code.     I spent a total of 10 minutes engaging the patient in this advance care planning discussion.

## 2024-05-30 NOTE — ASSESSMENT & PLAN NOTE
Patient with acute kidney injury/acute renal failure likely due to pre-renal azotemia due to IVVD JIM is currently worsening. Baseline creatinine  2.3-2.5  - Labs reviewed- Renal function/electrolytes with Estimated Creatinine Clearance: 15.2 mL/min (A) (based on SCr of 3.6 mg/dL (H)). according to latest data. Monitor urine output and serial BMP and adjust therapy as needed. Avoid nephrotoxins and renally dose meds for GFR listed above.  -received 1 unit of blood and 500 cc NS bolus   -2nd unit blood held due to developing dyspnea and hypoxia from volume overload   -lasix 40 mg IV x 1 given

## 2024-05-30 NOTE — H&P
Abdulkadir Duval - Cardiology [facility] Medicine  History & Physical    Patient Name: Dariel Urbina  MRN: [id number]  Patient Class: IP- Inpatient  Admission Date: 5/29/2024  Attending Physician: Alicia Spain MD   Primary Care Provider: Devon Langston Jr., MD         Patient information was obtained from patient and ER records.     Subjective:     Principal Problem:Acute blood loss anemia    Chief Complaint:   Chief Complaint   Patient presents with    Abnormal Lab     Low hgb , been having nose bleeds but not now    Chest Pain        HPI: Dariel Urbina is an 83 year old male with a past medical history of CAD s/p CABG 1990's, mechanical AVR (on warfarin c/b long standing epistaxis) c/b mod-severe MR w/ mitraclip, afib, GIB 2/2 diverticulosis s/p partial colon resection, T2DM (diet controlled), gout, HLD, HTN, CKD4 (Cr 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 Patient states he went to coumadin clinic earlier today for lab works and later received a call from clinic about low Hgb and elevated INR. He reports increased fatigue, generalized weakness and dyspnea on exertion over last couple of weeks. He also reports poor appetite and 15 lbs weight loss over last 2 months. He reports daily spontaneous nosebleed for many years for which he follows with ENT and had ligation and cauterization (most recent last week). ENT told patient that this is going to be life long problem given patient needs to be on coumadin for mechanical aortic valve. He reports swallowing blood drom nose bleed. Patient states nose bleed stopped this afternoon before coming to hospital w/o recurrence so far. He reports bilateral leg swelling despite taking bumex at home and he thinks diuretic is not working as well as it did in the past. He denies bleeding from any other sites. His last admission for  epistaxis was in December requiring cauterization.    ED course: afebrile and vitals stable. Hgb 5.6, INR 8.2, Cr 3.6. patient received 5 mg vitamin K and 1 unit blood transfusion in ED. During my interview, patient is awake, conversant and not in distress. He is oxygenating well on room air.      KIRSTEN (4/14/23):  The left ventricle is normal in size with normal systolic function.The estimated ejection fraction is 60%.  Normal right ventricular size with normal right ventricular systolic function.  Moderate mitral regurgitation.  There is a mechanical aortic valve present. There is no aortic insufficiency present.  Plaque present in the transverse aorta.                Past Medical History:   Diagnosis Date    Anemia of chronic renal failure, stage 3 (moderate) 05/27/2015    Anticoagulant long-term use     Atherosclerosis of coronary artery bypass graft of native heart without angina pectoris 09/11/2012    3-27-18 [facility] Two vessel coronary artery disease.   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    Bilateral carotid artery disease 02/09/2017    Bleeding from the nose     Bleeding nose 03/21/2018    Cancer     Cataract     CHF (congestive heart failure)     CKD (chronic kidney disease) stage 3, GFR 30-59 ml/min 05/27/2015    Claudication of left lower extremity 09/17/2014    Colon polyp     Encounter for blood transfusion     Gastroesophageal reflux disease without esophagitis 03/19/2018    Gastrointestinal hemorrhage associated with intestinal diverticulosis 04/01/2018    Glaucoma     H/O mechanical aortic valve replacement 09/17/2014    History of gout 09/26/2012    Hyperparathyroidism due to renal insufficiency 07/27/2015    Internal hemorrhoid 04/03/2018    Long term current use of anticoagulant therapy 09/26/2012    Mechanical heart valve present     Metabolic acidosis with normal anion gap and bicarbonate losses 03/20/2018    Mixed hyperlipidemia 09/26/2012    NSTEMI (non-ST elevated  myocardial infarction) 03/21/2018    Obesity, diabetes, and hypertension syndrome 02/23/2016    Paroxysmal atrial fibrillation 02/06/2023    PVD (peripheral vascular disease) 09/11/2012    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       Past Surgical History:   Procedure Laterality Date    BACK SURGERY      CARDIAC CATHETERIZATION      CARDIAC VALVE REPLACEMENT      CARDIAC VALVE SURGERY      CARPAL TUNNEL RELEASE Right 05/19/2020    Procedure: RELEASE, CARPAL TUNNEL;  Surgeon: Rupesh Norris Jr., MD;  Location: [facility];  Service: Plastics;  Laterality: Right;    CATARACT EXTRACTION Left 11/13/2022        COLON SURGERY      COLONOSCOPY N/A 03/31/2017    Procedure: COLONOSCOPY;  Surgeon: Bruno Raymond MD;  Location: [facility] (4TH FLR);  Service: Endoscopy;  Laterality: N/A;  Patient's wife requesting date.    COLONOSCOPY N/A 04/03/2018    Procedure: COLONOSCOPY;  Surgeon: Bonifacio Pelletier MD;  Location: [facility] (2ND FLR);  Service: Endoscopy;  Laterality: N/A;    COLONOSCOPY N/A 08/13/2018    Procedure: COLONOSCOPY;  Surgeon: Kam Barba MD;  Location: [facility] (2ND FLR);  Service: Endoscopy;  Laterality: N/A;  2nd floor: PA pressure 49; hx of moderate-severe valve disease     per Coumadin clinic-Patient can hold 5 days with lovenox bridge       ok to schedule per Katarina    CORONARY ANGIOGRAPHY N/A 10/04/2021    Procedure: Left heart cath +/- peripheral angiogram;  Surgeon: Jose Ruiz MD;  Location: [facility] CATH LAB;  Service: Cardiology;  Laterality: N/A;    CORONARY ANGIOGRAPHY N/A 3/2/2023    Procedure: Angiogram, Coronary;  Surgeon: Devon Garnica MD;  Location: [facility] CATH LAB;  Service:  Cardiology;  Laterality: N/A;    CORONARY ANGIOPLASTY      CORONARY ARTERY BYPASS GRAFT      CORONARY BYPASS GRAFT ANGIOGRAPHY  10/04/2021    Procedure: Bypass graft study;  Surgeon: Jose Ruiz MD;  Location: [facility] CATH LAB;  Service: Cardiology;;    CORONARY BYPASS GRAFT ANGIOGRAPHY  3/2/2023    Procedure: Bypass graft study;  Surgeon: Devon Garnica MD;  Location: [facility] CATH LAB;  Service: Cardiology;;    ECHOCARDIOGRAM,TRANSESOPHAGEAL N/A 4/14/2023    Procedure: Transesophageal echo (KIRSTEN) intra-procedure log documentation;  Surgeon: [facility] Diagnostic Provider;  Location: [facility] EP LAB;  Service: Cardiology;  Laterality: N/A;    ENDOSCOPIC NASAL CAUTERIZATION Bilateral 11/3/2023    Procedure: CAUTERIZATION, NOSE, ENDOSCOPIC;  Surgeon: Jono Russell MD;  Location: [facility] OR 2ND FLR;  Service: ENT;  Laterality: Bilateral;    ENDOSCOPIC NASAL CAUTERIZATION N/A 12/18/2023    Procedure: CAUTERIZATION, NOSE, ENDOSCOPIC;  Surgeon: Jono Russell MD;  Location: [facility] OR 2ND FLR;  Service: ENT;  Laterality: N/A;    EYE SURGERY      FESS, WITH IMAGING GUIDANCE Left 2/28/2024    Procedure: FESS, WITH IMAGING GUIDANCE;  Surgeon: Jono Russell MD;  Location: [facility] OR 2ND FLR;  Service: ENT;  Laterality: Left;  Scan loaded ENT I'mOKtronic. CL    FUNCTIONAL ENDOSCOPIC SINUS SURGERY (FESS) Bilateral 6/14/2023    Procedure: FESS (FUNCTIONAL ENDOSCOPIC SINUS SURGERY);  Surgeon: Jono Russell MD;  Location: [facility] OR 2ND FLR;  Service: ENT;  Laterality: Bilateral;    HERNIA REPAIR      INTRAOCULAR PROSTHESES INSERTION Left 11/13/2022    Procedure: INSERTION, IOL PROSTHESIS;  Surgeon: Alia Mckeon MD;  Location: [facility] OR 1ST FLR;  Service: Ophthalmology;  Laterality: Left;    INTRAOCULAR PROSTHESES INSERTION Right 12/04/2022    Procedure: INSERTION, IOL PROSTHESIS;  Surgeon: Alia Mckeon MD;  Location: [facility] OR 1ST FLR;  Service: Ophthalmology;  Laterality: Right;    LIGATION, ARTERY, ETHMOIDAL Left 2/28/2024     Procedure: LIGATION, ARTERY, ETHMOIDAL;  Surgeon: Jono Russell MD;  Location: [facility] OR 2ND FLR;  Service: ENT;  Laterality: Left;    LIGATION, ARTERY, SPHENOPALATINE, ENDOSCOPIC Bilateral 6/14/2023    Procedure: LIGATION, ARTERY, SPHENOPALATINE, ENDOSCOPIC;  Surgeon: Jono Russell MD;  Location: [facility] OR 2ND FLR;  Service: ENT;  Laterality: Bilateral;    PHACOEMULSIFICATION OF CATARACT Left 11/13/2022    Procedure: PHACOEMULSIFICATION, CATARACT;  Surgeon: Alia Mckeon MD;  Location: [facility] OR 1ST FLR;  Service: Ophthalmology;  Laterality: Left;    PHACOEMULSIFICATION OF CATARACT Right 12/04/2022    Procedure: PHACOEMULSIFICATION, CATARACT;  Surgeon: Alia Mckeon MD;  Location: [facility] OR 1ST FLR;  Service: Ophthalmology;  Laterality: Right;    SPINE SURGERY      TRANSESOPHAGEAL ECHOCARDIOGRAPHY N/A 3/22/2023    Procedure: ECHOCARDIOGRAM, TRANSESOPHAGEAL;  Surgeon: [facility] Diagnostic Provider;  Location: [facility] EP LAB;  Service: Anesthesiology;  Laterality: N/A;    TRANSESOPHAGEAL ECHOCARDIOGRAPHY N/A 4/11/2023    Procedure: ECHOCARDIOGRAM, TRANSESOPHAGEAL;  Surgeon: [facility] Diagnostic Provider;  Location: [facility] EP LAB;  Service: Anesthesiology;  Laterality: N/A;    VASECTOMY         Review of patient's allergies indicates:   Allergen Reactions    Lisinopril     Losartan      Intolerance- elevates potassium level       Current Facility-Administered Medications on File Prior to Encounter   Medication    ceFAZolin 2 g in dextrose 5 % in water (D5W) 50 mL IVPB (MB+)    HYDROmorphone injection 0.2 mg    sodium chloride 0.9% flush 10 mL     Current Outpatient Medications on File Prior to Encounter   Medication Sig    acetaminophen (TYLENOL) 500 MG tablet Take 500 mg by mouth daily as needed for Pain.    allopurinoL (ZYLOPRIM) 100 MG tablet Take 1 tablet (100 mg total) by mouth once daily.    amLODIPine (NORVASC) 5 MG tablet Take 1 tablet (5 mg total) by mouth once daily.    bumetanide (BUMEX) 1 MG tablet Take 1 tablet  (1 mg total) by mouth every other day.    ferrous gluconate (FERGON) 324 MG tablet TAKE 1 TABLET EVERY DAY WITH BREAKFAST    isosorbide mononitrate (IMDUR) 60 MG 24 hr tablet TAKE 1 TABLET EVERY EVENING    metoprolol succinate (TOPROL-XL) 25 MG 24 hr tablet Take 1 tablet (25 mg total) by mouth once daily.    omega-3 fatty acids/fish oil (FISH OIL-OMEGA-3 FATTY ACIDS) 300-1,000 mg capsule Take 1 capsule by mouth once daily.    rosuvastatin (CRESTOR) 40 MG Tab TAKE 1 TABLET EVERY EVENING.    sodium chloride (SALINE NASAL) 0.65 % nasal spray 2 sprays by Nasal route 3 (three) times daily.    traZODone (DESYREL) 50 MG tablet Take 1 tablet (50 mg total) by mouth every evening.    warfarin (COUMADIN) 5 MG tablet TAKE 1/2 TABLET (2.5MG) SATURDAY, THEN TAKE 1 TABLET (5MG) ALL OTHER DAYS OR AS INSTRUCTED BY COUMADIN CLINIC     Family History       Problem Relation (Age of Onset)    Alcohol abuse Father    Diabetes Brother    Heart disease Mother, Father, Brother, Sister    Heart failure Mother, Father, Brother    No Known Problems Sister, Maternal Grandmother, Maternal Grandfather, Paternal Grandmother, Paternal Grandfather, Maternal Aunt, Maternal Uncle, Paternal Aunt, Paternal Uncle          Tobacco Use    Smoking status: Former     Current packs/day: 0.00     Average packs/day: 1 pack/day for 20.0 years (20.0 ttl pk-yrs)     Types: Cigarettes     Start date: 1960     Quit date: 1980     Years since quittin.7     Passive exposure: Never    Smokeless tobacco: Never   Substance and Sexual Activity    Alcohol use: No    Drug use: No    Sexual activity: Not Currently     Partners: Female     Review of Systems   Constitutional:  Positive for appetite change, fatigue and unexpected weight change. Negative for activity change, chills, diaphoresis and fever.   HENT:  Positive for nosebleeds. Negative for congestion, dental problem, drooling, ear discharge, ear pain, facial swelling, hearing loss, mouth sores,  postnasal drip, rhinorrhea, sinus pressure, sneezing, sore throat, tinnitus, trouble swallowing and voice change.    Eyes:  Negative for photophobia, pain, discharge, redness, itching and visual disturbance.   Respiratory:  Negative for apnea, cough, choking, chest tightness, shortness of breath, wheezing and stridor.    Cardiovascular:  Positive for leg swelling. Negative for chest pain and palpitations.   Gastrointestinal:  Negative for abdominal distention, abdominal pain, anal bleeding, blood in stool, constipation, diarrhea, nausea, rectal pain and vomiting.   Endocrine: Negative for cold intolerance, heat intolerance, polydipsia, polyphagia and polyuria.   Genitourinary:  Negative for decreased urine volume, difficulty urinating, dysuria, enuresis, flank pain, frequency, genital sores, hematuria, penile discharge, penile pain, penile swelling, scrotal swelling, testicular pain and urgency.   Musculoskeletal:  Negative for arthralgias, back pain, gait problem, joint swelling, myalgias, neck pain and neck stiffness.   Skin:  Negative for color change, pallor, rash and wound.   Allergic/Immunologic: Negative for environmental allergies, food allergies and immunocompromised state.   Neurological:  Negative for dizziness, tremors, seizures, syncope, facial asymmetry, speech difficulty, weakness, light-headedness, numbness and headaches.   Hematological:  Negative for adenopathy. Does not bruise/bleed easily.   Psychiatric/Behavioral:  Negative for agitation, behavioral problems, confusion, decreased concentration, dysphoric mood, hallucinations, self-injury, sleep disturbance and suicidal ideas. The patient is not nervous/anxious and is not hyperactive.      Objective:     Vital Signs (Most Recent):  Temp: 97.3 °F (36.3 °C) (05/29/24 2253)  Pulse: (!) 52 (05/29/24 2253)  Resp: 20 (05/29/24 2253)  BP: (!) 130/59 (05/29/24 2253)  SpO2: 100 % (05/29/24 2253) Vital Signs (24h Range):  Temp:  [97.3 °F (36.3 °C)-97.5 °F  (36.4 °C)] 97.3 °F (36.3 °C)  Pulse:  [48-53] 52  Resp:  [16-20] 20  SpO2:  [95 %-100 %] 100 %  BP: (102-135)/(57-70) 130/59     Weight: 70.3 kg (155 lb)  Body mass index is 25.79 kg/m².     Physical Exam  Constitutional:       General: He is not in acute distress.     Appearance: He is well-developed. He is obese. He is not diaphoretic.   HENT:      Head: Normocephalic and atraumatic.      Nose: Nose normal.      Comments: Dry blood in nares.      Mouth/Throat:      Mouth: Mucous membranes are dry.      Pharynx: No oropharyngeal exudate.   Eyes:      General: No scleral icterus.     Conjunctiva/sclera: Conjunctivae normal.      Pupils: Pupils are equal, round, and reactive to light.   Neck:      Thyroid: No thyromegaly.      Vascular: No JVD.      Trachea: No tracheal deviation.   Cardiovascular:      Rate and Rhythm: Normal rate and regular rhythm.      Heart sounds: Normal heart sounds. No murmur heard.  Pulmonary:      Effort: Pulmonary effort is normal. No respiratory distress.      Breath sounds: Rhonchi present. No wheezing or rales.   Chest:      Chest wall: No tenderness.   Abdominal:      General: Bowel sounds are normal. There is no distension.      Palpations: Abdomen is soft. There is no mass.      Tenderness: There is no abdominal tenderness. There is no guarding or rebound.   Musculoskeletal:         General: No tenderness. Normal range of motion.      Cervical back: Normal range of motion and neck supple.      Right lower leg: Edema present.      Left lower leg: Edema present.   Lymphadenopathy:      Cervical: No cervical adenopathy.   Skin:     General: Skin is warm and dry.      Findings: No erythema or rash.   Neurological:      Mental Status: He is alert and oriented to person, place, and time.      Cranial Nerves: No cranial nerve deficit.      Motor: No abnormal muscle tone.      Coordination: Coordination normal.      Deep Tendon Reflexes: Reflexes are normal and symmetric. Reflexes normal.    Psychiatric:         Thought Content: Thought content normal.         Judgment: Judgment normal.              CRANIAL NERVES     CN III, IV, VI   Pupils are equal, round, and reactive to light.       Significant Labs: All pertinent labs within the past 24 hours have been reviewed.  Recent Lab Results         05/29/24  2240 05/29/24 2103 05/29/24  1010   05/29/24  0830        Unit Blood Type Code   9500  [P]           Unit Expiration   [id number]  [P]           Unit Blood Type   O NEG  [P]           Albumin   3.2   3.0         ALP   57   62         ALT   42   48         Anion Gap   9   9         Aniso     Slight         AST   35   49         Baso #   0.03   0.02         Basophilic Stippling     Occasional         Basophil %   0.6   0.4         BILIRUBIN TOTAL   0.4  Comment: For infants and newborns, interpretation of results should be based  on gestational age, weight and in agreement with clinical  observations.    Premature Infant recommended reference ranges:  Up to 24 hours.............<8.0 mg/dL  Up to 48 hours............<12.0 mg/dL  3-5 days..................<15.0 mg/dL  6-29 days.................<15.0 mg/dL     0.3  Comment: For infants and newborns, interpretation of results should be based  on gestational age, weight and in agreement with clinical  observations.    Premature Infant recommended reference ranges:  Up to 24 hours.............<8.0 mg/dL  Up to 48 hours............<12.0 mg/dL  3-5 days..................<15.0 mg/dL  6-29 days.................<15.0 mg/dL           BNP 1,073  Comment: Values of less than 100 pg/ml are consistent with non-CHF populations.             BUN   63   56         Brynn/Echinocytes     Moderate         Calcium   9.3   9.8         Chloride   111   112         Cholesterol Total     96  Comment: The National Cholesterol Education Program (NCEP) has set the  following guidelines (reference ranges) for Cholesterol:  Optimal.....................<200 mg/dL  Borderline  Chest tightness High.............200-239 mg/dL  High........................> or = 240 mg/dL           CO2   18   18         CODING SYSTEM   APWV892  [P]           Creatinine   3.8   3.8         Crossmatch Interpretation   Compatible  [P]           Differential Method   Automated   Automated         DISPENSE STATUS   ISSUED  [P]           Dohle Bodies     Present         eGFR   15.1   15.1         Eos #   0.1   0.1         Eos %   1.2   1.7         Estimated Avg Glucose     111         Fragmented Cells     Moderate         Giant Platelets     Present         Glucose   101   122         Gran # (ANC)   3.4   3.8         Gran %   67.1   72.0         Group & Rh   O NEG           HDL     31  Comment: The National Cholesterol Education Program (NCEP) has set the  following guidelines (reference values) for HDL Cholesterol:  Low...............<40 mg/dL  Optimal...........>60 mg/dL           HDL/Cholesterol Ratio     32.3         Hematocrit   18.8  Comment: hgb and hct critical result(s) called and verbal readback obtained   from arvind mendez rn by [facility] 05/29/2024 21:41     19.8  Comment: hgb and hct   critical result(s) called and verbal readback obtained   from lisa avery rn  by [facility] 05/29/2024 17:04           Hemoglobin   5.6  Comment: hgb and hct critical result(s) called and verbal readback obtained   from arvind mendez rn by [facility] 05/29/2024 21:41     5.9  Comment: hgb and hct   critical result(s) called and verbal readback obtained   from lisa avery rn  by [facility] 05/29/2024 17:04           Hemoglobin A1C External     5.5  Comment: ADA Screening Guidelines:  5.7-6.4%  Consistent with prediabetes  >or=6.5%  Consistent with diabetes    High levels of fetal hemoglobin interfere with the HbA1C  assay. Heterozygous hemoglobin variants (HbS, HgC, etc)do  not significantly interfere with this assay.   However, presence of multiple variants may affect accuracy.           Hypo     Occasional         Immature Grans (Abs)    0.02  Comment: Mild elevation in immature granulocytes is non specific and   can be seen in a variety of conditions including stress response,   acute inflammation, trauma and pregnancy. Correlation with other   laboratory and clinical findings is essential.     0.02  Comment: Mild elevation in immature granulocytes is non specific and   can be seen in a variety of conditions including stress response,   acute inflammation, trauma and pregnancy. Correlation with other   laboratory and clinical findings is essential.           Immature Granulocytes   0.4   0.4         INDIRECT CRISTIANO   NEG           INR   8.2  Comment: Coumadin Therapy:  2.0 - 3.0 for INR for all indicators except mechanical heart valves  and antiphospholipid syndromes which should use 2.5 - 3.5.  DR. CHAVEZ BAHENA acknowledged and accepted results on test(s) INR   via secure chat.  by [facility] 05/29/2024 21:46       7.4  Comment: Coumadin Therapy:  2.0 - 3.0 for INR for all indicators except mechanical heart valves  and antiphospholipid syndromes which should use 2.5 - 3.5.  INR critical result(s) called and verbal readback obtained from DR. BREANNE BURNS MA. by [facility] 05/29/2024 16:13         LDL Cholesterol     36.2  Comment: The National Cholesterol Education Program (NCEP) has set the  following guidelines (reference values) for LDL Cholesterol:  Optimal.......................<130 mg/dL  Borderline High...............130-159 mg/dL  High..........................160-189 mg/dL  Very High.....................>190 mg/dL           Lymph #   1.1   0.8         Lymph %   21.0   15.6         MCH   29.8   30.9         MCHC   29.8   29.8         MCV   100   104         Mono #   0.5   0.5         Mono %   9.7   9.9         MPV   10.8   11.4         Non-HDL Cholesterol     65  Comment: Risk category and Non-HDL cholesterol goals:  Coronary heart disease (CHD)or equivalent (10-year risk of CHD >20%):  Non-HDL cholesterol goal     <130 mg/dL  Two or more CHD risk  factors and 10-year risk of CHD <= 20%:  Non-HDL cholesterol goal     <160 mg/dL  0 to 1 CHD risk factor:  Non-HDL cholesterol goal     <190 mg/dL           nRBC   0   0         Ovalocytes     Occasional         Platelet Estimate     Appears normal         Platelet Count   188   194         Poikilocytosis     Moderate         Poly     Occasional         Potassium   4.8   4.6         Product Code   [id number]  [P]           PROTEIN TOTAL   6.4   6.3         PT   78.6     71.5       RBC   1.88   1.91         RDW   15.2   15.3         Schistocytes     Present         Sodium   138   139         Specimen Outdate   06/01/2024 23:59           Target Cells     Occasional         Total Cholesterol/HDL Ratio     3.1         Toxic Granulation     Present         Triglycerides     144  Comment: The National Cholesterol Education Program (NCEP) has set the  following guidelines (reference values) for triglycerides:  Normal......................<150 mg/dL  Borderline High.............150-199 mg/dL  High........................200-499 mg/dL           Troponin I 0.034  Comment: The reference interval for Troponin I represents the 99th percentile   cutoff   for our facility and is consistent with 3rd generation assay   performance.               UNIT NUMBER   [id number]  [P]           Vacuolated Granulocytes     Present         WBC   5.04   5.26                  [P] - Preliminary Result               Significant Imaging: I have reviewed all pertinent imaging results/findings within the past 24 hours.  Assessment/Plan:     * Acute blood loss anemia  Patient's anemia is currently uncontrolled. Has received 1 units of PRBCs on 5/29/24 . Etiology likely d/t acute blood loss which was from epistaxis.  Current CBC reviewed-   Lab Results   Component Value Date    HGB 7.0 (L) 05/30/2024    HCT 22.6 (L) 05/30/2024     Monitor serial CBC and transfuse if patient becomes hemodynamically unstable, symptomatic or H/H drops below  7/21.  -ordered 2nd unit PRBC which is held due to acute dyspnea and hypoxia due to fluid overload   -CXR showed increased pulmonary edema compared to prior   -repeat Hgb improved to 7  -plan to complete 2nd unit PRBC after diuresis lasix 40 mg IV x 1 given     Epistaxis  -long standing with daily epistaxis while on coumadin   -seen by ENT with multiple ligation and cauterization (last on 5/21 during clinic visit)  -no active bleeding now but high risk for recurrence   -will consult ENT   -afrin prn   -advised not to blow nose and sneeze with mouth open       H/O mechanical aortic valve replacement  -done in 1990s   -on chronic coumadin c/b long standing epistaxis   -takes coumadin 5 mg nightly and last taken 5/28 with goal INR 2-3 per patient   -INR elevated to 8 and received vitamin K 5 mg in ED  -holding coumadin with plan to restart after INR < 3        Acute hypoxic respiratory failure  Patient with Hypoxic Respiratory failure which is Acute.  he is not on home oxygen. Supplemental oxygen was provided and noted-      .   Signs/symptoms of respiratory failure include- tachypnea, increased work of breathing, and wheezing. Contributing diagnoses includes - CHF Labs and images were reviewed. Patient Has not had a recent ABG. Will treat underlying causes and adjust management of respiratory failure as follows-   -received 1 unit blood and 500 cc NS bolus   -developed acute dyspnea and hypoxia during 2 nd unit blood transfusion (held)  CXR showed increased pulmonary edema   -lasix 40 mg IV x 1 given   -now oxygenating well on 4 liter N/C during my repeat interview and not in distress   -check echo in am       JIM (acute kidney injury)  Patient with acute kidney injury/acute renal failure likely due to pre-renal azotemia due to IVVD JIM is currently worsening. Baseline creatinine  2.3-2.5  - Labs reviewed- Renal function/electrolytes with Estimated Creatinine Clearance: 15.2 mL/min (A) (based on SCr of 3.6 mg/dL (H)).  according to latest data. Monitor urine output and serial BMP and adjust therapy as needed. Avoid nephrotoxins and renally dose meds for GFR listed above.  -received 1 unit of blood and 500 cc NS bolus   -2nd unit blood held due to developing dyspnea and hypoxia from volume overload   -lasix 40 mg IV x 1 given       Coronary artery disease involving native coronary artery of native heart without angina pectoris  Patient with known CAD s/p stent placement and CABG, which is controlled Will continue  beta blocker and Statin and monitor for S/Sx of angina/ACS. Continue to monitor on telemetry.     Paroxysmal atrial fibrillation  Patient with Paroxysmal (<7 days) atrial fibrillation which is controlled currently with Beta Blocker. Patient is currently in atrial fibrillation.[id number] Score: 4. HASBLED Score: . Anticoagulation indicated. Anticoagulation done with coumadin in setting of mechanical aortic valve .    CKD (chronic kidney disease), stage IV  Creatine stable for now. BMP reviewed- noted Estimated Creatinine Clearance: 15.2 mL/min (A) (based on SCr of 3.6 mg/dL (H)). according to latest data. 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  Monitor UOP and serial BMP and adjust therapy as needed. Renally dose meds. Avoid nephrotoxic medications and procedures.    Benign essential HTN  Chronic, controlled. Latest blood pressure and vitals reviewed-     Temp:  [97.3 °F (36.3 °C)-97.9 °F (36.6 °C)]   Pulse:  [45-60]   Resp:  [16-22]   BP: (101-156)/(49-70)   SpO2:  [92 %-100 %] .   Home meds for hypertension were reviewed and noted below.   Hypertension Medications               amLODIPine (NORVASC) 5 MG tablet Take 1 tablet (5 mg total) by mouth once daily.    bumetanide (BUMEX) 1 MG tablet Take 1 tablet (1 mg total) by mouth every other day.    isosorbide mononitrate (IMDUR) 60 MG 24 hr tablet TAKE 1 TABLET EVERY EVENING    metoprolol succinate (TOPROL-XL) 25 MG 24 hr tablet Take 1 tablet (25 mg total)  by mouth once daily.            While in the hospital, will manage blood pressure as follows; Continue home antihypertensive regimen    Will utilize p.r.n. blood pressure medication only if patient's blood pressure greater than 180/110 and he develops symptoms such as worsening chest pain or shortness of breath.    ACP (advance care planning)  Advance Care Planning    Date: 05/30/2024    [facility]  I engaged the patient in a voluntary conversation about advance care planning and we specifically addressed what the goals of care would be moving forward, in light of the patient's change in clinical status, specifically if his heart or breathing stops.  We did specifically address the patient's likely prognosis, which is fair .  We explored the patient's values and preferences for future care.  The patient endorses that what is most important right now is to focus on remaining as independent as possible, symptom/pain control, and extending life as long as possible, even it it means sacrificing quality    Accordingly, we have decided that the best plan to meet the patient's goals includes continuing with treatment and Full code.     I spent a total of 10 minutes engaging the patient in this advance care planning discussion.                  VTE Risk Mitigation (From admission, onward)           Ordered     IP VTE HIGH RISK PATIENT  Once         05/30/24 0634     Place sequential compression device  Until discontinued         05/30/24 0634     Place sequential compression device  Until discontinued         05/30/24 0057                                    Catalina Crooks DO  Department of Hospital Medicine  [facility] - Cardiology Stepdown

## 2024-05-30 NOTE — HPI
83M well known to ENT service for refractory epistaxis, on coumadin. He previously undergone bilateral SP ligation followed by bilateral embolization, cautery on the left septum for persistent bleeding on 11/03/2023 and again recently on 12/18/23. He recently underwent left AEA ligation on 2/28/24. Most recently seen in clinic 5/21 and underwent L sided cautery. Came to ED yesterday w severe fatigue, found to be anemia and supratherapeutic INR to about 8. Reports had large volume bleed prior to presentation, but none since. He is currently admitted to cardiology service, on nasal cannula, in no distress, not actively bleeding.

## 2024-05-30 NOTE — CONSULTS
PHARMACY CONSULT NOTE: WARFARIN  Dariel Urbina is a 83 y.o. male on warfarin therapy for mechanical AVR    Home dose: warfarin 5 mg daily  Coumadin clinic enrollment: enrolled with [facility]  INR goal: 2-3    Lab Results   Component Value Date    INR 7.3 () 05/30/2024    INR 8.2 (HH) 05/29/2024    INR 7.4 () 05/29/2024    PROTIME 26.6 (H) 10/29/2023    PROTIME 28.4 (H) 09/30/2023     Significant drug interactions: none  Diet: Coumadin restriction    Plan  Hold warfarin until INR < 3  PT/INR daily      Thank you for the consult, will continue to follow    Peter Posadas, Pharm.D., BCPS  [id number]      **Note: Consults are reviewed Monday-Friday 7:00am-3:30pm. THE ABOVE RECOMMENDATIONS ARE ONLY SUGGESTED.THE RECOMMENDATIONS SHOULD BE CONSIDERED IN CONJUNCTION WITH ALL PATIENT FACTORS. **

## 2024-05-30 NOTE — CONSULTS
[facility] - Cardiology Stepdown  Otorhinolaryngology-Head & Neck Surgery  Consult Note    Patient Name: Dariel Urbina  MRN: [id number]  Code Status: Full Code  Admission Date: 5/29/2024  Hospital Length of Stay: 1 days  Attending Physician: Luz Leslie MD  Primary Care Provider: Devon Langston Jr., MD    Patient information was obtained from patient, ER records, and primary team.     Inpatient consult to ENT  Consult performed by: Cooper Ghotra MD  Consult ordered by: Catalina Crooks DO        Subjective:     Chief Complaint/Reason for Admission: epistaxis    History of Present Illness: 83M well known to ENT service for refractory epistaxis, on coumadin. He previously undergone bilateral SP ligation followed by bilateral embolization, cautery on the left septum for persistent bleeding on 11/03/2023 and again recently on 12/18/23. He recently underwent left AEA ligation on 2/28/24. Most recently seen in clinic 5/21 and underwent L sided cautery. Came to ED yesterday w severe fatigue, found to be anemia and supratherapeutic INR to about 8. Reports had large volume bleed prior to presentation, but none since. He is currently admitted to cardiology service, on nasal cannula, in no distress, not actively bleeding.     Medications:  Continuous Infusions:  Scheduled Meds:   allopurinoL  100 mg Oral Daily    amLODIPine  5 mg Oral Daily    atorvastatin  40 mg Oral Daily    ferrous gluconate  324 mg Oral Daily with breakfast    furosemide (LASIX) injection  40 mg Intravenous Q12H    isosorbide mononitrate  60 mg Oral QHS    metoprolol succinate  25 mg Oral Daily    pantoprazole  40 mg Intravenous BID    sodium chloride  2 spray Each Nostril TID     PRN Meds:  Current Facility-Administered Medications:     0.9%  NaCl infusion (for blood administration), , Intravenous, Q24H PRN    0.9%  NaCl infusion (for blood administration), , Intravenous, Q24H PRN    acetaminophen, 650 mg, Oral, Q4H PRN     albuterol-ipratropium, 3 mL, Nebulization, Q4H PRN    dextrose 10%, 12.5 g, Intravenous, PRN    dextrose 10%, 25 g, Intravenous, PRN    glucagon (human recombinant), 1 mg, Intramuscular, PRN    glucose, 16 g, Oral, PRN    glucose, 24 g, Oral, PRN    naloxone, 0.02 mg, Intravenous, PRN    ondansetron, 4 mg, Intravenous, Q8H PRN    oxymetazoline, 2 spray, Each Nostril, Q12H PRN    sodium chloride 0.9%, 10 mL, Intravenous, Q12H PRN    sodium chloride 0.9%, 10 mL, Intravenous, PRN    traZODone, 50 mg, Oral, Nightly PRN     Current Facility-Administered Medications on File Prior to Encounter   Medication    ceFAZolin 2 g in dextrose 5 % in water (D5W) 50 mL IVPB (MB+)    HYDROmorphone injection 0.2 mg    sodium chloride 0.9% flush 10 mL     Current Outpatient Medications on File Prior to Encounter   Medication Sig    acetaminophen (TYLENOL) 500 MG tablet Take 500 mg by mouth daily as needed for Pain.    allopurinoL (ZYLOPRIM) 100 MG tablet Take 1 tablet (100 mg total) by mouth once daily.    amLODIPine (NORVASC) 5 MG tablet Take 1 tablet (5 mg total) by mouth once daily.    bumetanide (BUMEX) 1 MG tablet Take 1 tablet (1 mg total) by mouth every other day.    ferrous gluconate (FERGON) 324 MG tablet TAKE 1 TABLET EVERY DAY WITH BREAKFAST    isosorbide mononitrate (IMDUR) 60 MG 24 hr tablet TAKE 1 TABLET EVERY EVENING    metoprolol succinate (TOPROL-XL) 25 MG 24 hr tablet Take 1 tablet (25 mg total) by mouth once daily.    omega-3 fatty acids/fish oil (FISH OIL-OMEGA-3 FATTY ACIDS) 300-1,000 mg capsule Take 1 capsule by mouth once daily.    rosuvastatin (CRESTOR) 40 MG Tab TAKE 1 TABLET EVERY EVENING.    sodium chloride (SALINE NASAL) 0.65 % nasal spray 2 sprays by Nasal route 3 (three) times daily.    traZODone (DESYREL) 50 MG tablet Take 1 tablet (50 mg total) by mouth every evening.    warfarin (COUMADIN) 5 MG tablet TAKE 1/2 TABLET (2.5MG) SATURDAY, THEN TAKE 1 TABLET (5MG) ALL OTHER DAYS OR AS INSTRUCTED BY COUMADIN  CLINIC       Review of patient's allergies indicates:   Allergen Reactions    Lisinopril     Losartan      Intolerance- elevates potassium level       Past Medical History:   Diagnosis Date    Anemia of chronic renal failure, stage 3 (moderate) 05/27/2015    Anticoagulant long-term use     Atherosclerosis of coronary artery bypass graft of native heart without angina pectoris 09/11/2012    3-27-18 [facility] Two vessel coronary artery disease.   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    Bilateral carotid artery disease 02/09/2017    Bleeding from the nose     Bleeding nose 03/21/2018    Cancer     Cataract     CHF (congestive heart failure)     CKD (chronic kidney disease) stage 3, GFR 30-59 ml/min 05/27/2015    Claudication of left lower extremity 09/17/2014    Colon polyp     Encounter for blood transfusion     Gastroesophageal reflux disease without esophagitis 03/19/2018    Gastrointestinal hemorrhage associated with intestinal diverticulosis 04/01/2018    Glaucoma     H/O mechanical aortic valve replacement 09/17/2014    History of gout 09/26/2012    Hyperparathyroidism due to renal insufficiency 07/27/2015    Internal hemorrhoid 04/03/2018    Long term current use of anticoagulant therapy 09/26/2012    Mechanical heart valve present     Metabolic acidosis with normal anion gap and bicarbonate losses 03/20/2018    Mixed hyperlipidemia 09/26/2012    NSTEMI (non-ST elevated myocardial infarction) 03/21/2018    Obesity, diabetes, and hypertension syndrome 02/23/2016    Paroxysmal atrial fibrillation 02/06/2023    PVD (peripheral vascular disease) 09/11/2012    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      Past Surgical History:   Procedure Laterality Date    BACK SURGERY      CARDIAC CATHETERIZATION      CARDIAC VALVE REPLACEMENT      CARDIAC VALVE SURGERY      CARPAL TUNNEL RELEASE Right 05/19/2020    Procedure: RELEASE, CARPAL TUNNEL;  Surgeon: Rupesh Norris Jr., MD;  Location: [facility];  Service: Plastics;  Laterality: Right;    CATARACT EXTRACTION Left 11/13/2022        COLON SURGERY      COLONOSCOPY N/A 03/31/2017    Procedure: COLONOSCOPY;  Surgeon: Bruno Raymond MD;  Location: [facility] (4TH FLR);  Service: Endoscopy;  Laterality: N/A;  Patient's wife requesting date.    COLONOSCOPY N/A 04/03/2018    Procedure: COLONOSCOPY;  Surgeon: Bonifacio Pelletier MD;  Location: [facility] ENDO (2ND FLR);  Service: Endoscopy;  Laterality: N/A;    COLONOSCOPY N/A 08/13/2018    Procedure: COLONOSCOPY;  Surgeon: Kam Barba MD;  Location: [facility] ENDO (2ND FLR);  Service: Endoscopy;  Laterality: N/A;  2nd floor: PA pressure 49; hx of moderate-severe valve disease     per Coumadin clinic-Patient can hold 5 days with lovenox bridge       ok to schedule per Katarina    CORONARY ANGIOGRAPHY N/A 10/04/2021    Procedure: Left heart cath +/- peripheral angiogram;  Surgeon: Jose Ruiz MD;  Location: [facility] CATH LAB;  Service: Cardiology;  Laterality: N/A;    CORONARY ANGIOGRAPHY N/A 3/2/2023    Procedure: Angiogram, Coronary;  Surgeon: Devon Garnica MD;  Location: [facility] CATH LAB;  Service: Cardiology;  Laterality: N/A;    CORONARY ANGIOPLASTY      CORONARY ARTERY BYPASS GRAFT      CORONARY BYPASS GRAFT ANGIOGRAPHY  10/04/2021    Procedure: Bypass graft study;  Surgeon: Jose Ruiz MD;  Location: [facility] CATH LAB;  Service: Cardiology;;    CORONARY BYPASS GRAFT ANGIOGRAPHY  3/2/2023    Procedure: Bypass graft study;  Surgeon: Devon Garnica MD;  Location: [facility] CATH LAB;  Service: Cardiology;;    ECHOCARDIOGRAM,TRANSESOPHAGEAL N/A 4/14/2023    Procedure: Transesophageal echo (KIRSTEN) intra-procedure log  documentation;  Surgeon: [facility] Diagnostic Provider;  Location: [facility] EP LAB;  Service: Cardiology;  Laterality: N/A;    ENDOSCOPIC NASAL CAUTERIZATION Bilateral 11/3/2023    Procedure: CAUTERIZATION, NOSE, ENDOSCOPIC;  Surgeon: Jono Russell MD;  Location: [facility] OR 2ND FLR;  Service: ENT;  Laterality: Bilateral;    ENDOSCOPIC NASAL CAUTERIZATION N/A 12/18/2023    Procedure: CAUTERIZATION, NOSE, ENDOSCOPIC;  Surgeon: Jono Russell MD;  Location: [facility] OR 2ND FLR;  Service: ENT;  Laterality: N/A;    EYE SURGERY      FESS, WITH IMAGING GUIDANCE Left 2/28/2024    Procedure: FESS, WITH IMAGING GUIDANCE;  Surgeon: Jono Russell MD;  Location: [facility] OR [facility] FLR;  Service: ENT;  Laterality: Left;  Scan loaded ENT Medtronic. CL    FUNCTIONAL ENDOSCOPIC SINUS SURGERY (FESS) Bilateral 6/14/2023    Procedure: FESS (FUNCTIONAL ENDOSCOPIC SINUS SURGERY);  Surgeon: Jono Russell MD;  Location: [facility] OR 2ND FLR;  Service: ENT;  Laterality: Bilateral;    HERNIA REPAIR      INTRAOCULAR PROSTHESES INSERTION Left 11/13/2022    Procedure: INSERTION, IOL PROSTHESIS;  Surgeon: Alia Mckeon MD;  Location: [facility] OR 1ST FLR;  Service: Ophthalmology;  Laterality: Left;    INTRAOCULAR PROSTHESES INSERTION Right 12/04/2022    Procedure: INSERTION, IOL PROSTHESIS;  Surgeon: Alia Mckeon MD;  Location: [facility] OR 1ST FLR;  Service: Ophthalmology;  Laterality: Right;    LIGATION, ARTERY, ETHMOIDAL Left 2/28/2024    Procedure: LIGATION, ARTERY, ETHMOIDAL;  Surgeon: Jono Russell MD;  Location: [facility] OR 2ND FLR;  Service: ENT;  Laterality: Left;    LIGATION, ARTERY, SPHENOPALATINE, ENDOSCOPIC Bilateral 6/14/2023    Procedure: LIGATION, ARTERY, SPHENOPALATINE, ENDOSCOPIC;  Surgeon: Jono Russell MD;  Location: [facility] OR 2ND FLR;  Service: ENT;  Laterality: Bilateral;    PHACOEMULSIFICATION OF CATARACT Left 11/13/2022    Procedure: PHACOEMULSIFICATION, CATARACT;  Surgeon: Alia Mckeon MD;  Location: [facility] OR 1ST FLR;  Service:  Ophthalmology;  Laterality: Left;    PHACOEMULSIFICATION OF CATARACT Right 2022    Procedure: PHACOEMULSIFICATION, CATARACT;  Surgeon: Alia Mckeon MD;  Location: [facility] OR [address] [place] [address];  Service: Ophthalmology;  Laterality: Right;    SPINE SURGERY      TRANSESOPHAGEAL ECHOCARDIOGRAPHY N/A 3/22/2023    Procedure: ECHOCARDIOGRAM, TRANSESOPHAGEAL;  Surgeon: [facility] Diagnostic Provider;  Location: [facility] EP LAB;  Service: Anesthesiology;  Laterality: N/A;    TRANSESOPHAGEAL ECHOCARDIOGRAPHY N/A 2023    Procedure: ECHOCARDIOGRAM, TRANSESOPHAGEAL;  Surgeon: [facility] Diagnostic Provider;  Location: [facility] EP LAB;  Service: Anesthesiology;  Laterality: N/A;    VASECTOMY       Family History       Problem Relation (Age of Onset)    Alcohol abuse Father    Diabetes Brother    Heart disease Mother, Father, Brother, Sister    Heart failure Mother, Father, Brother    No Known Problems Sister, Maternal Grandmother, Maternal Grandfather, Paternal Grandmother, Paternal Grandfather, Maternal Aunt, Maternal Uncle, Paternal Aunt, Paternal Uncle          Tobacco Use    Smoking status: Former     Current packs/day: 0.00     Average packs/day: 1 pack/day for 20.0 years (20.0 ttl pk-yrs)     Types: Cigarettes     Start date: 1960     Quit date: 1980     Years since quittin.7     Passive exposure: Never    Smokeless tobacco: Never   Substance and Sexual Activity    Alcohol use: No    Drug use: No    Sexual activity: Not Currently     Partners: Female     Review of Systems   Constitutional:  Positive for activity change and fatigue. Negative for chills, fever and unexpected weight change.   HENT:  Positive for nosebleeds. Negative for congestion, ear discharge, ear pain, hearing loss, sore throat, trouble swallowing and voice change.    Eyes:  Negative for pain and visual disturbance.   Respiratory:  Negative for cough, shortness of breath and stridor.    Cardiovascular:  Negative for chest pain.   Gastrointestinal:  Negative  for abdominal pain, nausea and vomiting.   Skin:  Negative for rash and wound.     Objective:     Vital Signs (Most Recent):  Temp: 97.5 °F (36.4 °C) (05/30/24 0818)  Pulse: 63 (05/30/24 0818)  Resp: 16 (05/30/24 0818)  BP: (!) 147/66 (05/30/24 0818)  SpO2: 95 % (05/30/24 0818) Vital Signs (24h Range):  Temp:  [97.3 °F (36.3 °C)-97.9 °F (36.6 °C)] 97.5 °F (36.4 °C)  Pulse:  [45-63] 63  Resp:  [16-22] 16  SpO2:  [92 %-100 %] 95 %  BP: (101-156)/(49-70) 147/66     Weight: 78.1 kg (172 lb 2.9 oz)  Body mass index is 28.65 kg/m².         Physical Exam  NCAT  NAD  Breathing well on nasal cannula, no acute distress, dried bloody crusting noted anterioly  HB I/VI bilat  Hearing well at conversational volume  Euphonic  OC and OP patent, tongue soft and midline, no posterior OP bleeding  Anterior neck soft, no crepitus       Significant Labs:  BMP:   Recent Labs   Lab 05/30/24 0253   GLU 82   *   CO2 17*   BUN 60*   CREATININE 3.6*   CALCIUM 9.4   MG 2.3     CBC:   Recent Labs   Lab 05/30/24 0253   WBC 4.79   RBC 2.45*   HGB 7.0*   HCT 22.6*      MCV 92   MCH 28.6   MCHC 31.0*     Coagulation:   Recent Labs   Lab 05/30/24 0253   LABPROT 70.1*   INR 7.3*   APTT 45.8*       Significant Diagnostics:  I have reviewed and interpreted all pertinent imaging results/findings within the past 24 hours.    Assessment/Plan:     Epistaxis  83M w extensive PMH, known to ENT service for refractory epistaxis status post bilateral SP ligation followed by bilateral embolization, cautery on the left septum for persistent bleeding on 11/03/2023 and again recently on 12/18/23. He recently underwent left AEA ligation on 2/28/24 and nasal cautery on 5/21. Presents to ED w further bleeding, supratherapeutic INR to 8.1.    - No acute ENT intervention at this time as patient is currently hemostatic  - Recommend prompt correction of supratherapeutic INR, as this is likely cause of refractory bleeding  - Consider switching to  alternative AC agent on DC rather than coumadin, as patient has repeatedly demonstrated poor compliance/response  - Recommend against any instrumentation of the nose  - Recommend against nasal cannula - if patient requires supp O2, recommend humidified O2 via face tent or mask  - For further bleeding, recommend liberally spraying Afrin and holding manual pressure for 10 min. For further bleeding, page ENT on call. May require angiography and poss emobilization w IR as salvage treatment  - Please page w questions or concerns    Patient seen w Dr. Russell      VTE Risk Mitigation (From admission, onward)           Ordered     IP VTE HIGH RISK PATIENT  Once         05/30/24 0634     Place sequential compression device  Until discontinued         05/30/24 0634     Place sequential compression device  Until discontinued         05/30/24 0057                    Thank you for your consult. I will follow-up with patient. Please contact us if you have any additional questions.    Cooper Ghotra MD  Otorhinolaryngology-Head & Neck Surgery  [facility] - Cardiology Stepdown

## 2024-05-30 NOTE — ASSESSMENT & PLAN NOTE
83M w extensive PMH, known to ENT service for refractory epistaxis status post bilateral SP ligation followed by bilateral embolization, cautery on the left septum for persistent bleeding on 11/03/2023 and again recently on 12/18/23. He recently underwent left AEA ligation on 2/28/24 and nasal cautery on 5/21. Presents to ED w further bleeding, supratherapeutic INR to 8.1.    - No acute ENT intervention at this time as patient is currently hemostatic  - Recommend prompt correction of supratherapeutic INR, as this is likely cause of refractory bleeding  - Consider switching to alternative AC agent on DC rather than coumadin, as patient has repeatedly demonstrated poor compliance/response  - Recommend against any instrumentation of the nose  - Recommend against nasal cannula - if patient requires supp O2, recommend humidified O2 via face tent or mask  - For further bleeding, recommend liberally spraying Afrin and holding manual pressure for 10 min. For further bleeding, page ENT on call. May require angiography and poss emobilization w IR as salvage treatment  - Please page w questions or concerns    Patient seen w Dr. Russell

## 2024-05-30 NOTE — CARE UPDATE
I have reviewed the chart of Dariel Urbina who is hospitalized for the following:    Active Hospital Problems    Diagnosis    *Acute blood loss anemia    Acute hypoxic respiratory failure    Benign essential HTN    ACP (advance care planning)    Acute on chronic systolic heart failure     POA  After blood transfusion, requiring O2  Diuresed with IV Lasix      Paroxysmal atrial fibrillation    Coronary artery disease involving native coronary artery of native heart without angina pectoris    JIM (acute kidney injury)    CKD (chronic kidney disease), stage IV    Epistaxis    H/O mechanical aortic valve replacement    Chronic anticoagulation        Kasia Perdomo NP  [facility]

## 2024-05-31 ENCOUNTER — DOCUMENTATION ONLY (OUTPATIENT)
Dept: CARDIOLOGY | Facility: CLINIC | Age: 84
End: 2024-05-31
Payer: MEDICARE

## 2024-05-31 PROBLEM — N17.9 AKI (ACUTE KIDNEY INJURY): Status: RESOLVED | Noted: 2022-12-21 | Resolved: 2024-05-31

## 2024-05-31 PROBLEM — N17.9 ACUTE KIDNEY INJURY SUPERIMPOSED ON CHRONIC KIDNEY DISEASE: Status: ACTIVE | Noted: 2022-02-10

## 2024-05-31 PROBLEM — N18.9 ACUTE KIDNEY INJURY SUPERIMPOSED ON CHRONIC KIDNEY DISEASE: Status: ACTIVE | Noted: 2022-02-10

## 2024-05-31 LAB
ALBUMIN SERPL BCP-MCNC: 2.9 G/DL (ref 3.5–5.2)
ALBUMIN SERPL BCP-MCNC: 3.1 G/DL (ref 3.5–5.2)
ALLENS TEST: ABNORMAL
ALP SERPL-CCNC: 54 U/L (ref 55–135)
ALT SERPL W/O P-5'-P-CCNC: 28 U/L (ref 10–44)
ANION GAP SERPL CALC-SCNC: 10 MMOL/L (ref 8–16)
ANION GAP SERPL CALC-SCNC: 10 MMOL/L (ref 8–16)
ANION GAP SERPL CALC-SCNC: 12 MMOL/L (ref 8–16)
ANION GAP SERPL CALC-SCNC: 9 MMOL/L (ref 8–16)
ANISOCYTOSIS BLD QL SMEAR: SLIGHT
APTT PPP: 45.1 SEC (ref 21–32)
AST SERPL-CCNC: 22 U/L (ref 10–40)
BASO STIPL BLD QL SMEAR: ABNORMAL
BASOPHILS # BLD AUTO: 0.02 K/UL (ref 0–0.2)
BASOPHILS # BLD AUTO: 0.03 K/UL (ref 0–0.2)
BASOPHILS NFR BLD: 0.2 % (ref 0–1.9)
BASOPHILS NFR BLD: 0.6 % (ref 0–1.9)
BILIRUB SERPL-MCNC: 0.6 MG/DL (ref 0.1–1)
BLD PROD TYP BPU: NORMAL
BLOOD UNIT EXPIRATION DATE: NORMAL
BLOOD UNIT TYPE CODE: 9500
BLOOD UNIT TYPE: NORMAL
BUN SERPL-MCNC: 60 MG/DL (ref 8–23)
BUN SERPL-MCNC: 61 MG/DL (ref 8–23)
BUN SERPL-MCNC: 62 MG/DL (ref 8–23)
BUN SERPL-MCNC: 63 MG/DL (ref 8–23)
BURR CELLS BLD QL SMEAR: ABNORMAL
CALCIUM SERPL-MCNC: 9.2 MG/DL (ref 8.7–10.5)
CALCIUM SERPL-MCNC: 9.3 MG/DL (ref 8.7–10.5)
CALCIUM SERPL-MCNC: 9.6 MG/DL (ref 8.7–10.5)
CALCIUM SERPL-MCNC: 9.8 MG/DL (ref 8.7–10.5)
CHLORIDE SERPL-SCNC: 108 MMOL/L (ref 95–110)
CHLORIDE SERPL-SCNC: 110 MMOL/L (ref 95–110)
CHLORIDE SERPL-SCNC: 112 MMOL/L (ref 95–110)
CHLORIDE SERPL-SCNC: 112 MMOL/L (ref 95–110)
CO2 SERPL-SCNC: 17 MMOL/L (ref 23–29)
CO2 SERPL-SCNC: 18 MMOL/L (ref 23–29)
CO2 SERPL-SCNC: 19 MMOL/L (ref 23–29)
CO2 SERPL-SCNC: 19 MMOL/L (ref 23–29)
CODING SYSTEM: NORMAL
CREAT SERPL-MCNC: 3.5 MG/DL (ref 0.5–1.4)
CREAT SERPL-MCNC: 3.5 MG/DL (ref 0.5–1.4)
CREAT SERPL-MCNC: 3.7 MG/DL (ref 0.5–1.4)
CREAT SERPL-MCNC: 3.7 MG/DL (ref 0.5–1.4)
CROSSMATCH INTERPRETATION: NORMAL
DACRYOCYTES BLD QL SMEAR: ABNORMAL
DELSYS: ABNORMAL
DIFFERENTIAL METHOD BLD: ABNORMAL
DIFFERENTIAL METHOD BLD: ABNORMAL
DISPENSE STATUS: NORMAL
DOHLE BOD BLD QL SMEAR: PRESENT
EOSINOPHIL # BLD AUTO: 0 K/UL (ref 0–0.5)
EOSINOPHIL # BLD AUTO: 0.1 K/UL (ref 0–0.5)
EOSINOPHIL NFR BLD: 0.2 % (ref 0–8)
EOSINOPHIL NFR BLD: 1.7 % (ref 0–8)
ERYTHROCYTE [DISTWIDTH] IN BLOOD BY AUTOMATED COUNT: 21.4 % (ref 11.5–14.5)
ERYTHROCYTE [DISTWIDTH] IN BLOOD BY AUTOMATED COUNT: 23.3 % (ref 11.5–14.5)
EST. GFR  (NO RACE VARIABLE): 15.6 ML/MIN/1.73 M^2
EST. GFR  (NO RACE VARIABLE): 15.6 ML/MIN/1.73 M^2
EST. GFR  (NO RACE VARIABLE): 16.7 ML/MIN/1.73 M^2
EST. GFR  (NO RACE VARIABLE): 16.7 ML/MIN/1.73 M^2
FLOW: 10
GIANT PLATELETS BLD QL SMEAR: PRESENT
GLUCOSE SERPL-MCNC: 113 MG/DL (ref 70–110)
GLUCOSE SERPL-MCNC: 137 MG/DL (ref 70–110)
GLUCOSE SERPL-MCNC: 76 MG/DL (ref 70–110)
GLUCOSE SERPL-MCNC: 76 MG/DL (ref 70–110)
HCO3 UR-SCNC: 19.9 MMOL/L (ref 24–28)
HCT VFR BLD AUTO: 21.3 % (ref 40–54)
HCT VFR BLD AUTO: 25.6 % (ref 40–54)
HCT VFR BLD CALC: 25 %PCV (ref 36–54)
HGB BLD-MCNC: 6.6 G/DL (ref 14–18)
HGB BLD-MCNC: 7.9 G/DL (ref 14–18)
HYPOCHROMIA BLD QL SMEAR: ABNORMAL
IMM GRANULOCYTES # BLD AUTO: 0.02 K/UL (ref 0–0.04)
IMM GRANULOCYTES # BLD AUTO: 0.06 K/UL (ref 0–0.04)
IMM GRANULOCYTES NFR BLD AUTO: 0.4 % (ref 0–0.5)
IMM GRANULOCYTES NFR BLD AUTO: 0.7 % (ref 0–0.5)
INR PPP: 4.2 (ref 0.8–1.2)
LYMPHOCYTES # BLD AUTO: 0.3 K/UL (ref 1–4.8)
LYMPHOCYTES # BLD AUTO: 0.7 K/UL (ref 1–4.8)
LYMPHOCYTES NFR BLD: 12.2 % (ref 18–48)
LYMPHOCYTES NFR BLD: 3.7 % (ref 18–48)
MAGNESIUM SERPL-MCNC: 2.1 MG/DL (ref 1.6–2.6)
MAGNESIUM SERPL-MCNC: 2.2 MG/DL (ref 1.6–2.6)
MAGNESIUM SERPL-MCNC: 2.2 MG/DL (ref 1.6–2.6)
MCH RBC QN AUTO: 28.4 PG (ref 27–31)
MCH RBC QN AUTO: 29 PG (ref 27–31)
MCHC RBC AUTO-ENTMCNC: 30.9 G/DL (ref 32–36)
MCHC RBC AUTO-ENTMCNC: 31 G/DL (ref 32–36)
MCV RBC AUTO: 92 FL (ref 82–98)
MCV RBC AUTO: 94 FL (ref 82–98)
MODE: ABNORMAL
MONOCYTES # BLD AUTO: 0.5 K/UL (ref 0.3–1)
MONOCYTES # BLD AUTO: 0.5 K/UL (ref 0.3–1)
MONOCYTES NFR BLD: 6.3 % (ref 4–15)
MONOCYTES NFR BLD: 8.5 % (ref 4–15)
NEUTROPHILS # BLD AUTO: 4.1 K/UL (ref 1.8–7.7)
NEUTROPHILS # BLD AUTO: 7.4 K/UL (ref 1.8–7.7)
NEUTROPHILS NFR BLD: 76.6 % (ref 38–73)
NEUTROPHILS NFR BLD: 88.9 % (ref 38–73)
NRBC BLD-RTO: 0 /100 WBC
NRBC BLD-RTO: 0 /100 WBC
OHS QRS DURATION: 170 MS
OHS QTC CALCULATION: 495 MS
OVALOCYTES BLD QL SMEAR: ABNORMAL
PCO2 BLDA: 39.2 MMHG (ref 35–45)
PH SMN: 7.31 [PH] (ref 7.35–7.45)
PHOSPHATE SERPL-MCNC: 5.3 MG/DL (ref 2.7–4.5)
PHOSPHATE SERPL-MCNC: 5.4 MG/DL (ref 2.7–4.5)
PLATELET # BLD AUTO: 165 K/UL (ref 150–450)
PLATELET # BLD AUTO: 172 K/UL (ref 150–450)
PLATELET BLD QL SMEAR: ABNORMAL
PMV BLD AUTO: 10.1 FL (ref 9.2–12.9)
PMV BLD AUTO: 10.3 FL (ref 9.2–12.9)
PO2 BLDA: 55 MMHG (ref 80–100)
POC BE: -6 MMOL/L
POC IONIZED CALCIUM: 1.31 MMOL/L (ref 1.06–1.42)
POC SATURATED O2: 85 % (ref 95–100)
POC TCO2: 21 MMOL/L (ref 23–27)
POIKILOCYTOSIS BLD QL SMEAR: ABNORMAL
POLYCHROMASIA BLD QL SMEAR: ABNORMAL
POTASSIUM BLD-SCNC: 4.2 MMOL/L (ref 3.5–5.1)
POTASSIUM SERPL-SCNC: 4.2 MMOL/L (ref 3.5–5.1)
POTASSIUM SERPL-SCNC: 4.2 MMOL/L (ref 3.5–5.1)
POTASSIUM SERPL-SCNC: 4.3 MMOL/L (ref 3.5–5.1)
POTASSIUM SERPL-SCNC: 4.4 MMOL/L (ref 3.5–5.1)
PROT SERPL-MCNC: 6 G/DL (ref 6–8.4)
PROTHROMBIN TIME: 42.2 SEC (ref 9–12.5)
RBC # BLD AUTO: 2.32 M/UL (ref 4.6–6.2)
RBC # BLD AUTO: 2.72 M/UL (ref 4.6–6.2)
SAMPLE: ABNORMAL
SCHISTOCYTES BLD QL SMEAR: ABNORMAL
SCHISTOCYTES BLD QL SMEAR: PRESENT
SITE: ABNORMAL
SODIUM BLD-SCNC: 138 MMOL/L (ref 136–145)
SODIUM SERPL-SCNC: 137 MMOL/L (ref 136–145)
SODIUM SERPL-SCNC: 139 MMOL/L (ref 136–145)
SODIUM SERPL-SCNC: 140 MMOL/L (ref 136–145)
SODIUM SERPL-SCNC: 140 MMOL/L (ref 136–145)
SPHEROCYTES BLD QL SMEAR: ABNORMAL
TOXIC GRANULES BLD QL SMEAR: PRESENT
TRANS ERYTHROCYTES VOL PATIENT: NORMAL ML
TROPONIN I SERPL DL<=0.01 NG/ML-MCNC: 0.06 NG/ML (ref 0–0.03)
TROPONIN I SERPL DL<=0.01 NG/ML-MCNC: 0.1 NG/ML (ref 0–0.03)
TROPONIN I SERPL DL<=0.01 NG/ML-MCNC: 0.34 NG/ML (ref 0–0.03)
WBC # BLD AUTO: 5.31 K/UL (ref 3.9–12.7)
WBC # BLD AUTO: 8.28 K/UL (ref 3.9–12.7)

## 2024-05-31 PROCEDURE — 84484 ASSAY OF TROPONIN QUANT: CPT | Mod: 91,HCNC | Performed by: STUDENT IN AN ORGANIZED HEALTH CARE EDUCATION/TRAINING PROGRAM

## 2024-05-31 PROCEDURE — 20600001 HC STEP DOWN PRIVATE ROOM: Mod: HCNC

## 2024-05-31 PROCEDURE — 80053 COMPREHEN METABOLIC PANEL: CPT | Mod: HCNC | Performed by: HOSPITALIST

## 2024-05-31 PROCEDURE — 83735 ASSAY OF MAGNESIUM: CPT | Mod: HCNC | Performed by: HOSPITALIST

## 2024-05-31 PROCEDURE — 93010 ELECTROCARDIOGRAM REPORT: CPT | Mod: HCNC,,, | Performed by: INTERNAL MEDICINE

## 2024-05-31 PROCEDURE — 82800 BLOOD PH: CPT | Mod: HCNC

## 2024-05-31 PROCEDURE — 94761 N-INVAS EAR/PLS OXIMETRY MLT: CPT | Mod: HCNC

## 2024-05-31 PROCEDURE — 80069 RENAL FUNCTION PANEL: CPT | Mod: HCNC | Performed by: STUDENT IN AN ORGANIZED HEALTH CARE EDUCATION/TRAINING PROGRAM

## 2024-05-31 PROCEDURE — 93005 ELECTROCARDIOGRAM TRACING: CPT | Mod: HCNC

## 2024-05-31 PROCEDURE — 99900035 HC TECH TIME PER 15 MIN (STAT): Mod: HCNC

## 2024-05-31 PROCEDURE — 36430 TRANSFUSION BLD/BLD COMPNT: CPT | Mod: HCNC

## 2024-05-31 PROCEDURE — 63600175 PHARM REV CODE 636 W HCPCS: Mod: HCNC | Performed by: STUDENT IN AN ORGANIZED HEALTH CARE EDUCATION/TRAINING PROGRAM

## 2024-05-31 PROCEDURE — 84132 ASSAY OF SERUM POTASSIUM: CPT | Mod: HCNC

## 2024-05-31 PROCEDURE — C9113 INJ PANTOPRAZOLE SODIUM, VIA: HCPCS | Mod: HCNC | Performed by: HOSPITALIST

## 2024-05-31 PROCEDURE — P9021 RED BLOOD CELLS UNIT: HCPCS | Mod: HCNC | Performed by: STUDENT IN AN ORGANIZED HEALTH CARE EDUCATION/TRAINING PROGRAM

## 2024-05-31 PROCEDURE — 82330 ASSAY OF CALCIUM: CPT | Mod: HCNC

## 2024-05-31 PROCEDURE — 83735 ASSAY OF MAGNESIUM: CPT | Mod: 91,HCNC | Performed by: STUDENT IN AN ORGANIZED HEALTH CARE EDUCATION/TRAINING PROGRAM

## 2024-05-31 PROCEDURE — 84100 ASSAY OF PHOSPHORUS: CPT | Mod: HCNC | Performed by: HOSPITALIST

## 2024-05-31 PROCEDURE — 36415 COLL VENOUS BLD VENIPUNCTURE: CPT | Mod: HCNC | Performed by: HOSPITALIST

## 2024-05-31 PROCEDURE — 80048 BASIC METABOLIC PNL TOTAL CA: CPT | Mod: HCNC,XB | Performed by: HOSPITALIST

## 2024-05-31 PROCEDURE — 36600 WITHDRAWAL OF ARTERIAL BLOOD: CPT | Mod: HCNC

## 2024-05-31 PROCEDURE — 84295 ASSAY OF SERUM SODIUM: CPT | Mod: HCNC

## 2024-05-31 PROCEDURE — 25000003 PHARM REV CODE 250: Mod: HCNC | Performed by: HOSPITALIST

## 2024-05-31 PROCEDURE — 85025 COMPLETE CBC W/AUTO DIFF WBC: CPT | Mod: HCNC | Performed by: HOSPITALIST

## 2024-05-31 PROCEDURE — 85025 COMPLETE CBC W/AUTO DIFF WBC: CPT | Mod: 91,HCNC | Performed by: STUDENT IN AN ORGANIZED HEALTH CARE EDUCATION/TRAINING PROGRAM

## 2024-05-31 PROCEDURE — 27000221 HC OXYGEN, UP TO 24 HOURS: Mod: HCNC

## 2024-05-31 PROCEDURE — 85610 PROTHROMBIN TIME: CPT | Mod: HCNC | Performed by: HOSPITALIST

## 2024-05-31 PROCEDURE — 86920 COMPATIBILITY TEST SPIN: CPT | Mod: HCNC | Performed by: STUDENT IN AN ORGANIZED HEALTH CARE EDUCATION/TRAINING PROGRAM

## 2024-05-31 PROCEDURE — 80048 BASIC METABOLIC PNL TOTAL CA: CPT | Mod: 91,HCNC,XB | Performed by: STUDENT IN AN ORGANIZED HEALTH CARE EDUCATION/TRAINING PROGRAM

## 2024-05-31 PROCEDURE — 85730 THROMBOPLASTIN TIME PARTIAL: CPT | Mod: HCNC | Performed by: HOSPITALIST

## 2024-05-31 PROCEDURE — 82803 BLOOD GASES ANY COMBINATION: CPT | Mod: HCNC

## 2024-05-31 PROCEDURE — 36415 COLL VENOUS BLD VENIPUNCTURE: CPT | Mod: HCNC,XB | Performed by: STUDENT IN AN ORGANIZED HEALTH CARE EDUCATION/TRAINING PROGRAM

## 2024-05-31 PROCEDURE — 63600175 PHARM REV CODE 636 W HCPCS: Mod: HCNC | Performed by: HOSPITALIST

## 2024-05-31 PROCEDURE — 85014 HEMATOCRIT: CPT | Mod: HCNC

## 2024-05-31 RX ORDER — FUROSEMIDE 10 MG/ML
80 INJECTION INTRAMUSCULAR; INTRAVENOUS EVERY 8 HOURS
Status: DISCONTINUED | OUTPATIENT
Start: 2024-05-31 | End: 2024-06-02

## 2024-05-31 RX ORDER — FERROUS GLUCONATE 324(37.5)
324 TABLET ORAL EVERY OTHER DAY
Status: DISCONTINUED | OUTPATIENT
Start: 2024-06-01 | End: 2024-06-03 | Stop reason: HOSPADM

## 2024-05-31 RX ORDER — FUROSEMIDE 10 MG/ML
80 INJECTION INTRAMUSCULAR; INTRAVENOUS EVERY 12 HOURS
Status: DISCONTINUED | OUTPATIENT
Start: 2024-05-31 | End: 2024-05-31

## 2024-05-31 RX ORDER — HYDROCODONE BITARTRATE AND ACETAMINOPHEN 500; 5 MG/1; MG/1
TABLET ORAL
Status: DISCONTINUED | OUTPATIENT
Start: 2024-05-31 | End: 2024-06-02

## 2024-05-31 RX ORDER — FUROSEMIDE 10 MG/ML
40 INJECTION INTRAMUSCULAR; INTRAVENOUS ONCE
Status: COMPLETED | OUTPATIENT
Start: 2024-05-31 | End: 2024-05-31

## 2024-05-31 RX ADMIN — FUROSEMIDE 80 MG: 10 INJECTION, SOLUTION INTRAVENOUS at 02:05

## 2024-05-31 RX ADMIN — Medication 324 MG: at 08:05

## 2024-05-31 RX ADMIN — ISOSORBIDE MONONITRATE 60 MG: 60 TABLET, EXTENDED RELEASE ORAL at 09:05

## 2024-05-31 RX ADMIN — PANTOPRAZOLE SODIUM 40 MG: 40 INJECTION, POWDER, FOR SOLUTION INTRAVENOUS at 09:05

## 2024-05-31 RX ADMIN — AMLODIPINE BESYLATE 5 MG: 5 TABLET ORAL at 08:05

## 2024-05-31 RX ADMIN — FUROSEMIDE 40 MG: 10 INJECTION, SOLUTION INTRAVENOUS at 09:05

## 2024-05-31 RX ADMIN — ALLOPURINOL 100 MG: 300 TABLET ORAL at 08:05

## 2024-05-31 RX ADMIN — METOPROLOL SUCCINATE 25 MG: 25 TABLET, EXTENDED RELEASE ORAL at 08:05

## 2024-05-31 RX ADMIN — FUROSEMIDE 80 MG: 10 INJECTION, SOLUTION INTRAVENOUS at 09:05

## 2024-05-31 RX ADMIN — ATORVASTATIN CALCIUM 40 MG: 40 TABLET, FILM COATED ORAL at 08:05

## 2024-05-31 RX ADMIN — FUROSEMIDE 40 MG: 10 INJECTION, SOLUTION INTRAVENOUS at 08:05

## 2024-05-31 RX ADMIN — PANTOPRAZOLE SODIUM 40 MG: 40 INJECTION, POWDER, FOR SOLUTION INTRAVENOUS at 08:05

## 2024-05-31 NOTE — PLAN OF CARE
Problem: Adult Inpatient Plan of Care  Goal: Plan of Care Review  Outcome: Progressing  Goal: Patient-Specific Goal (Individualized)  Outcome: Progressing  Goal: Absence of Hospital-Acquired Illness or Injury  Outcome: Progressing  Goal: Optimal Comfort and Wellbeing  Outcome: Progressing  Goal: Readiness for Transition of Care  Outcome: Progressing     Problem: Diabetes Comorbidity  Goal: Blood Glucose Level Within Targeted Range  Outcome: Progressing     Problem: Fall Injury Risk  Goal: Absence of Fall and Fall-Related Injury  Outcome: Progressing     Problem: Acute Kidney Injury/Impairment  Goal: Fluid and Electrolyte Balance  Outcome: Progressing  Goal: Improved Oral Intake  Outcome: Progressing  Goal: Effective Renal Function  Outcome: Progressing     Problem: Acute Kidney Injury/Impairment  Goal: Effective Renal Function  Outcome: Progressing     Problem: Gas Exchange Impaired  Goal: Optimal Gas Exchange  Outcome: Progressing     Problem: Skin Injury Risk Increased  Goal: Skin Health and Integrity  Outcome: Progressing   Plan of care discussed with patient. Patient is free of fall/trauma/injury. Denies CP, SOB, or pain/discomfort. All questions addressed. Will continue to monitor. Pt received 1 unit PRBC, see note and flowsheet. AAOx4. Pt weaned down to 3L Simple face mask. Pt placed on continuous pulse ox.  VSS. Pt ambulates standby assist.

## 2024-05-31 NOTE — NURSING
1 unit PRBC transfusing. Pt c/o of SOB. Oxygen increased to 5L, Pt satting at 93%.  VS obtained and stable. Scheduled lasix IV push given, per MAR. Pt still c/o of SOB with new onset CP, described as 9/10 pressure in middle of chest. MD notified and came to bedside to assess. STAT EKG and Trop  ordered. O2 desat to 89% on 5L, pt switched to nonrebreather. VS obtained and blood paused. Additional 40mg Lasix IV push ordered and given. O2 sats improved. Pt states CP has gone away and patient is visibly breathing better. Blood restarted.

## 2024-05-31 NOTE — ASSESSMENT & PLAN NOTE
Chronic, controlled. Latest blood pressure and vitals reviewed-     Temp:  [97 °F (36.1 °C)-98 °F (36.7 °C)]   Pulse:  [47-82]   Resp:  [17-20]   BP: (118-153)/(46-72)   SpO2:  [93 %-100 %] .   Home meds for hypertension were reviewed and noted below.   Hypertension Medications               amLODIPine (NORVASC) 5 MG tablet Take 1 tablet (5 mg total) by mouth once daily.    bumetanide (BUMEX) 1 MG tablet Take 1 tablet (1 mg total) by mouth every other day.    isosorbide mononitrate (IMDUR) 60 MG 24 hr tablet TAKE 1 TABLET EVERY EVENING    metoprolol succinate (TOPROL-XL) 25 MG 24 hr tablet Take 1 tablet (25 mg total) by mouth once daily.            While in the hospital, will manage blood pressure as follows; Continue home antihypertensive regimen    Will utilize p.r.n. blood pressure medication only if patient's blood pressure greater than 180/110 and he develops symptoms such as worsening chest pain or shortness of breath.

## 2024-05-31 NOTE — HOSPITAL COURSE
Patient admitted to Hospital Medicine Team C on 5/29 with recurrent epistaxis, symptomatic anemia and supratherapeutic INR. He was given 1 U pRBCs for Hgb 5.6 with improvement to 7.0 and a dose of Vitamin K. Coumadin held on admission, INR downtrending, now 4.2. ENT consulted for epistaxis; recommended use of afrin and holding manual pressure should he continue to have bleeding. Elevated BNP >1000 on admission, started on IV lasix 40 mg BID. Hospital course complicated by JIM on CKD, with Cr >3. On 5/31, Hgb 6.6, 1 U pRBCs given. On the morning of 5/31, prior to receiving pRBCs, he acutely developed SOB and CP. EKG similar to prior. Given concern for pulmonary edema, he was given IV 80 mg lasix with improvement in SOB and CP. Troponin elevated with peak of 0.566, likely demand in the setting of pulmonary edema and resolution of symptoms with IV lasix. Adequate diuresis with IV lasix, weaned to room air.  He was discharged on Bumex 2mg from 1mg prior to admit.  INR now subtherapeutic, restarted Coumadin on 6/01 at 2.5. Home dose resumed on 6/03. PT/OT recommending home with .       Mr. Dariel Urbina was deemed appropriate for discharge.  At the time of discharge, the plan of care was discussed with patient/family, who were agreeable and amenable.  All medications were verbally reviewed and discussed with the patient/family.  They endorsed understanding and compliance.  Informed them that these changes will be available on their discharge paperwork, as well.  Outpatient follow-ups were scheduled, or if unable to be scheduled ambulatory referrals were placed, and ER return precautions were given.  All questions were answered to the patient/family's satisfaction.  Mr. Dariel Urbina was subsequently discharged in stable condition.

## 2024-05-31 NOTE — CARE UPDATE
Evaluation of Early Detection of Sepsis Risk     Patient Name: Dariel Urbina  MRN:  [id number]  Primary Care Team: [facility]  Date of Admission:  5/29/2024     Principal Problem:  Acute blood loss anemia   Date of Review: 05/31/2024    Patient reviewed for elevated sepsis risk score. Sepsis Screen Negative  Will continue to monitor for signs and symptoms of new or worsening infection and screen as needed. Please notify Rapid Response Team for any hypotension or decompensation.    Sepsis Screen Results     IP Sepsis Screen (most recent)       Sepsis Screen (IP) - 05/31/24 1404       Is the patient's history or complaint suggestive of a possible infection? No  -SL    Are there at least two of the following signs and symptoms present? No  -SL    Are any of the following organ dysfunction criteria present and not considered to be due to a chronic condition? No  -SL    Organ Dysfunction Criteria Creatinine > 2.0  -SL    Organ Dysfunction Criteria INR > 1.5 or aPTT > 60  -SL    Organ Dysfunction Criteria - Resp Comp Respiratory Compromise: Requiring > 5L NC  -SL    Initiate Sepsis Protocol No  -SL              User Key  (r) = Recorded By, (t) = Taken By, (c) = Cosigned By      Initials Name    Kasia Saravia NP Stephanie A Launey, NP  [facility]

## 2024-05-31 NOTE — ASSESSMENT & PLAN NOTE
Baseline 2.3 to 2.5; 3.8 on admission.     -Monitor UOP and kidney function.   -Improving with diuresis.

## 2024-05-31 NOTE — CONSULTS
PHARMACY CONSULT NOTE: WARFARIN  Dariel Urbina is a 83 y.o. male on warfarin therapy for mechanical AVR    Home dose: warfarin 5 mg daily  Coumadin clinic enrollment: enrolled with [facility]  INR goal: 2-3    Lab Results   Component Value Date    INR 4.2 (H) 05/31/2024    INR 5.5 (HH) 05/30/2024    INR 7.3 (HH) 05/30/2024    PROTIME 26.6 (H) 10/29/2023    PROTIME 28.4 (H) 09/30/2023     Significant drug interactions: none  Diet: Coumadin restriction    Plan  Hold warfarin until INR < 3  PT/INR daily      Thank you for the consult, will continue to follow    Peter Posadas, Pharm.D., BCPS  [id number]      **Note: Consults are reviewed Monday-Friday 7:00am-3:30pm. THE ABOVE RECOMMENDATIONS ARE ONLY SUGGESTED.THE RECOMMENDATIONS SHOULD BE CONSIDERED IN CONJUNCTION WITH ALL PATIENT FACTORS. **

## 2024-05-31 NOTE — ASSESSMENT & PLAN NOTE
Advance Care Planning     Date: 05/31/2024    [facility]  I engaged the patient in a voluntary conversation about advance care planning and we specifically addressed what the goals of care would be moving forward, in light of the patient's change in clinical status, specifically if his heart or breathing stops.  We did specifically address the patient's likely prognosis, which is fair .  We explored the patient's values and preferences for future care.  The patient endorses that what is most important right now is to focus on remaining as independent as possible, symptom/pain control, and extending life as long as possible, even it it means sacrificing quality    Accordingly, we have decided that the best plan to meet the patient's goals includes continuing with treatment and Full code.     I spent a total of 10 minutes engaging the patient in this advance care planning discussion.

## 2024-05-31 NOTE — CARE UPDATE
RAPID RESPONSE NURSE PROACTIVE ROUNDING NOTE       Time of Visit: 1055    Admit Date: 2024  LOS: 2  Code Status: Full Code   Date of Visit: 2024  : 1940  Age: 83 y.o.  Sex: male  Race: White  Bed: 354/354 A:   MRN: [id number]  Was the patient discharged from an ICU this admission? No   Was the patient discharged from a PACU within last 24 hours? No   Did the patient receive conscious sedation/general anesthesia in last 24 hours? No  Was the patient in the ED within the past 24 hours? No  Was the patient on NIPPV within the past 24 hours? No   Attending Physician: Jazzmine Salinas MD  Primary Service: [facility]    Time spent at the bedside: < 15 min    SITUATION    Notified by charge RN via phone call.  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 angina pectoris, Bilateral carotid artery disease, Bleeding from the nose, Bleeding nose, Cancer, Cataract, CHF (congestive heart failure), CKD (chronic kidney disease) stage 3, GFR 30-59 ml/min, Claudication of left lower extremity, Colon polyp, Encounter for blood transfusion, Gastroesophageal reflux disease without esophagitis, Gastrointestinal hemorrhage associated with intestinal diverticulosis, Glaucoma, H/O mechanical aortic valve replacement, History of gout, Hyperparathyroidism due to renal insufficiency, Internal hemorrhoid, Long term current use of anticoagulant therapy, Mechanical heart valve present, Metabolic acidosis with normal anion gap and bicarbonate losses, Mixed hyperlipidemia, NSTEMI (non-ST elevated myocardial infarction), Obesity, diabetes, and hypertension syndrome, Paroxysmal atrial fibrillation, PVD (peripheral vascular  disease), Renovascular hypertension, 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    Last Vitals:  Temp: 97.4 °F (36.3 °C) (05/31 1147)  Pulse: 60 (05/31 1149)  Resp: 17 (05/31 1147)  BP: 153/65 (05/31 1147)  SpO2: 100 % (05/31 1149)    24 Hours Vitals Range:  Temp:  [97 °F (36.1 °C)-98 °F (36.7 °C)]   Pulse:  [47-82]   Resp:  [17-20]   BP: (118-153)/(46-72)   SpO2:  [93 %-100 %]     Labs:  Recent Labs     05/29/24  2103 05/30/24  0253 05/30/24  1109 05/31/24  0337 05/31/24  1246   WBC 5.04 4.79  --  5.31  --    HGB 5.6* 7.0* 7.1* 6.6*  --    HCT 18.8* 22.6*  --  21.3* 25*    174  --  165  --        Recent Labs     05/30/24  0253 05/30/24  1109 05/31/24  0337 05/31/24  1027    139 140  140 139   K 4.6 4.4 4.4  4.3 4.2   * 113* 112*  112* 110   CO2 17* 17* 19*  18* 17*   BUN 60* 59* 63*  62* 61*   CREATININE 3.6* 3.5* 3.7*  3.7* 3.5*   GLU 82 91 76  76 113*   PHOS 4.8*  --  5.3*  --    MG 2.3 2.3 2.2 2.2        Recent Labs     05/31/24  1246   PH 7.314*   PCO2 39.2   PO2 55*   HCO3 19.9*   POCSATURATED 85   BE -6*        ASSESSMENT      Physical Exam  Constitutional:       General: He is awake. He is not in acute distress.  HENT:      Head: Normocephalic.   Cardiovascular:      Rate and Rhythm: Normal rate. Rhythm irregular.   Pulmonary:      Effort: Tachypnea present. No respiratory distress.      Comments: 10L simple face mask  Neurological:      Mental Status: He is alert and oriented to person, place, and time.         INTERVENTIONS    The patient was seen for Cardiac problem. Staff concerns included chest pain and SOB. The following interventions were performed: Troponin, EKG, lasix, continuous cardiac monitoring continued, and continuous pulse ox monitoring continued.    Patient  experienced chest pain and SOB this AM. On exam, this has resolved. He is on 10L simple face mask (no nasal cannula d/t nose bleeds). Patient is currently receiving 1 unit PRBCs for hemoglobin 6.6. Patient given lasix this morning.    RECOMMENDATIONS    Trend CBC.     Wean O2 as tolerated.     Accurate intake and output documented.    PROVIDER ESCALATION    Yes/No  No    Disposition: Remain in room 354.    FOLLOW-UP    Charge Derick GARCIA  updated on plan of care. Instructed to call the Rapid Response Nurse, Priscilla Dumont RN at [phone] for additional questions or concerns.

## 2024-05-31 NOTE — ASSESSMENT & PLAN NOTE
Patient with Hypoxic Respiratory failure which is Acute.  he is not on home oxygen. Supplemental oxygen was provided and noted-    Signs/symptoms of respiratory failure include- tachypnea, increased work of breathing, and wheezing.     -developed acute dyspnea and hypoxia during 2nd unit blood transfusion.   -CXR showed increased pulmonary edema   -Worsening dyspnea and new chest pain on 5/31, given 80 IV lasix with improvement. Increase IV lasix to 80 mg TID. Suspect his worsening hypoxemia likely from pulmonary edema. PE is a consideration but less likely given supratherapeutic INR.  -Wean oxygen as tolerated.

## 2024-05-31 NOTE — ASSESSMENT & PLAN NOTE
Patient with Paroxysmal (<7 days) atrial fibrillation which is controlled currently with Beta Blocker. Patient is currently in atrial fibrillation.[id number] Score: 4. HASBLED Score: . Anticoagulation indicated. Anticoagulation done with coumadin in setting of mechanical aortic valve .

## 2024-05-31 NOTE — ASSESSMENT & PLAN NOTE
Patient's anemia is currently uncontrolled. Has received 1 units of PRBCs on 5/29/24 . Etiology likely d/t acute blood loss which was from epistaxis.  Current CBC reviewed-   Lab Results   Component Value Date    HGB 6.6 (L) 05/31/2024    HCT 25 (L) 05/31/2024     -Now s/p 2 U pRBCs.  -Caution with extra volume given respiratory status.

## 2024-05-31 NOTE — ASSESSMENT & PLAN NOTE
Echo    Result Date: 5/30/2024    Left Ventricle: The left ventricle is normal in size. Normal wall   thickness. There is concentric hypertrophy. Septal motion is consistent   with post-operative status. There is low normal systolic function with a   visually estimated ejection fraction of 50 - 55%. Biplane (2D) method of   discs ejection fraction is 51%.    Right Ventricle: Mild right ventricular enlargement. Wall thickness is   normal. Systolic function is normal.    Left Atrium: Left atrium is severely dilated.    Right Atrium: Right atrium is severely dilated.    Aortic Valve: There is a mechanical valve in the aortic position.   Aortic valve area by VTI is 0.87 cm². Aortic valve peak velocity is 3.02   m/s. Mean gradient is 19 mmHg. The dimensionless index is 0.31. There is   trace aortic regurgitation.    Mitral Valve: There is mild bileaflet sclerosis. There is moderate   mitral annular calcification present. There is moderate to severe   regurgitation with a centrally directed jet.    Tricuspid Valve: There is moderate regurgitation.    Pulmonary Artery: There is moderate pulmonary hypertension. The   estimated pulmonary artery systolic pressure is 57 mmHg.    IVC/SVC: Intermediate venous pressure at 8 mmHg.         -See acute hypoxemic respiratory failure.

## 2024-05-31 NOTE — PROGRESS NOTES
[facility] ([facility]) Team notified of pt referral via [facility] (automated inbasket notification) .    Patient screened today 05/31/2024 by provider and RN for enrollment to program.      Pt was deemed not a candidate for enrollment at this time related to patient primary presenting complaint would not benefit from Heart Failure Transitional Care Program.     Pt will require additional follow up planning per primary team.     If pt status, diagnosis, or treatment plan changes , please place AMB referral to [facility] ([id number]) for H[facility] enrollment re-evalution.

## 2024-05-31 NOTE — PROGRESS NOTES
Abdulkadir Duval - Cardiology [facility] Medicine  Progress Note    Patient Name: Dariel Urbina  MRN: [id number]  Patient Class: IP- Inpatient   Admission Date: 5/29/2024  Length of Stay: 2 days  Attending Physician: Jazzmine Salinas MD  Primary Care Provider: Devon Langston Jr., MD        Subjective:     Principal Problem:Acute blood loss anemia    HPI:  Dariel Urbina is an 83 year old male with a past medical history of CAD s/p CABG 1990's, mechanical AVR (on warfarin c/b long standing epistaxis) c/b mod-severe MR w/ mitraclip, afib, GIB 2/2 diverticulosis s/p partial colon resection, T2DM (diet controlled), gout, HLD, HTN, CKD4 (Cr 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 Patient states he went to coumadin clinic earlier today for lab works and later received a call from clinic about low Hgb and elevated INR. He reports increased fatigue, generalized weakness and dyspnea on exertion over last couple of weeks. He also reports poor appetite and 15 lbs weight loss over last 2 months. He reports daily spontaneous nosebleed for many years for which he follows with ENT and had ligation and cauterization (most recent last week). ENT told patient that this is going to be life long problem given patient needs to be on coumadin for mechanical aortic valve. He reports swallowing blood drom nose bleed. Patient states nose bleed stopped this afternoon before coming to hospital w/o recurrence so far. He reports bilateral leg swelling despite taking bumex at home and he thinks diuretic is not working as well as it did in the past. He denies bleeding from any other sites. His last admission for epistaxis was in December requiring cauterization.    ED course: afebrile and vitals stable. Hgb 5.6, INR 8.2, Cr 3.6. patient received 5 mg vitamin K and 1 unit blood transfusion in ED. During  my interview, patient is awake, conversant and not in distress. He is oxygenating well on room air.      KIRSTEN (4/14/23):  The left ventricle is normal in size with normal systolic function.The estimated ejection fraction is 60%.  Normal right ventricular size with normal right ventricular systolic function.  Moderate mitral regurgitation.  There is a mechanical aortic valve present. There is no aortic insufficiency present.  Plaque present in the transverse aorta.                Overview/Hospital Course:  Patient admitted to Hospital Medicine Team C on 5/29 with recurrent epistaxis, symptomatic anemia and supratherapeutic INR. He was given 1 U pRBCs for Hgb 5.6 with improvement to 7.0 and a dose of Vitamin K. Coumadin held on admission, INR downtrending, now 4.2. ENT consulted for epistaxis; recommended use of afrin and holding manual pressure should he continue to have bleeding. Elevated BNP >1000 on admission, started on IV lasix 40 mg BID. Hospital course complicated by JIM on CKD, with Cr >3. On 5/31, Hgb 6.6, 1 U pRBCs given. On the morning of 5/31, prior to receiving pRBCs, he acutely developed SOB and CP. EKG similar to prior. Troponin mildly elevated 0.057. Given concern for pulmonary edema, he was given IV 80 mg lasix with improvement in SOB and CP.     Interval History:   5/31, developed pressure like chest pain and SOB prior to blood transfusion. Rales head on bedside assessment. Increase in oxygen requirements from 5 L to 10 L simple face mask given oxygen saturation of 89%. EKG similar to prior and mildly elevated troponin. Given total of IV lasix 80 mg with improvement in symptoms.     Review of Systems   Constitutional:  Negative for chills and fever.   Respiratory:  Positive for shortness of breath.    Cardiovascular:  Positive for chest pain. Negative for palpitations and leg swelling.   Gastrointestinal:  Positive for constipation. Negative for abdominal pain, nausea and vomiting.     Objective:      Vital Signs (Most Recent):  Temp: 97.4 °F (36.3 °C) (05/31/24 1147)  Pulse: 60 (05/31/24 1149)  Resp: 17 (05/31/24 1147)  BP: (!) 153/65 (05/31/24 1147)  SpO2: 100 % (05/31/24 1149) Vital Signs (24h Range):  Temp:  [97 °F (36.1 °C)-98 °F (36.7 °C)] 97.4 °F (36.3 °C)  Pulse:  [47-82] 60  Resp:  [17-20] 17  SpO2:  [93 %-100 %] 100 %  BP: (118-153)/(46-72) 153/65     Weight: 78.5 kg (173 lb 1 oz)  Body mass index is 28.8 kg/m².    Intake/Output Summary (Last 24 hours) at 5/31/2024 1334  Last data filed at 5/31/2024 1215  Gross per 24 hour   Intake 1021 ml   Output 800 ml   Net 221 ml         Physical Exam  Vitals reviewed.   Constitutional:       General: He is not in acute distress.     Appearance: He is ill-appearing.   HENT:      Head: Normocephalic.   Eyes:      Extraocular Movements: Extraocular movements intact.   Cardiovascular:      Rate and Rhythm: Normal rate. Rhythm irregular.   Pulmonary:      Effort: Respiratory distress present.      Breath sounds: Rales present. No wheezing.   Abdominal:      General: There is no distension.      Palpations: Abdomen is soft.      Tenderness: There is no abdominal tenderness. There is no guarding.   Musculoskeletal:      Cervical back: Normal range of motion.   Skin:     Comments: Small open wound to L cheek with active oozing.    Neurological:      Mental Status: He is alert. Mental status is at baseline.             Significant Labs: All pertinent labs within the past 24 hours have been reviewed.    Significant Imaging: I have reviewed all pertinent imaging results/findings within the past 24 hours.    Assessment/Plan:      * Acute blood loss anemia  Patient's anemia is currently uncontrolled. Has received 1 units of PRBCs on 5/29/24 . Etiology likely d/t acute blood loss which was from epistaxis.  Current CBC reviewed-   Lab Results   Component Value Date    HGB 6.6 (L) 05/31/2024    HCT 25 (L) 05/31/2024     -Now s/p 2 U pRBCs.  -Caution with extra volume given  respiratory status.    Acute on chronic systolic heart failure  Echo    Result Date: 5/30/2024    Left Ventricle: The left ventricle is normal in size. Normal wall   thickness. There is concentric hypertrophy. Septal motion is consistent   with post-operative status. There is low normal systolic function with a   visually estimated ejection fraction of 50 - 55%. Biplane (2D) method of   discs ejection fraction is 51%.    Right Ventricle: Mild right ventricular enlargement. Wall thickness is   normal. Systolic function is normal.    Left Atrium: Left atrium is severely dilated.    Right Atrium: Right atrium is severely dilated.    Aortic Valve: There is a mechanical valve in the aortic position.   Aortic valve area by VTI is 0.87 cm². Aortic valve peak velocity is 3.02   m/s. Mean gradient is 19 mmHg. The dimensionless index is 0.31. There is   trace aortic regurgitation.    Mitral Valve: There is mild bileaflet sclerosis. There is moderate   mitral annular calcification present. There is moderate to severe   regurgitation with a centrally directed jet.    Tricuspid Valve: There is moderate regurgitation.    Pulmonary Artery: There is moderate pulmonary hypertension. The   estimated pulmonary artery systolic pressure is 57 mmHg.    IVC/SVC: Intermediate venous pressure at 8 mmHg.         -See acute hypoxemic respiratory failure.    ACP (advance care planning)  Advance Care Planning    Date: 05/31/2024    [facility]  I engaged the patient in a voluntary conversation about advance care planning and we specifically addressed what the goals of care would be moving forward, in light of the patient's change in clinical status, specifically if his heart or breathing stops.  We did specifically address the patient's likely prognosis, which is fair .  We explored the patient's values and preferences for future care.  The patient endorses that what is most important right now is to focus on remaining as independent as possible,  symptom/pain control, and extending life as long as possible, even it it means sacrificing quality    Accordingly, we have decided that the best plan to meet the patient's goals includes continuing with treatment and Full code.     I spent a total of 10 minutes engaging the patient in this advance care planning discussion.                Benign essential HTN  Chronic, controlled. Latest blood pressure and vitals reviewed-     Temp:  [97 °F (36.1 °C)-98 °F (36.7 °C)]   Pulse:  [47-82]   Resp:  [17-20]   BP: (118-153)/(46-72)   SpO2:  [93 %-100 %] .   Home meds for hypertension were reviewed and noted below.   Hypertension Medications               amLODIPine (NORVASC) 5 MG tablet Take 1 tablet (5 mg total) by mouth once daily.    bumetanide (BUMEX) 1 MG tablet Take 1 tablet (1 mg total) by mouth every other day.    isosorbide mononitrate (IMDUR) 60 MG 24 hr tablet TAKE 1 TABLET EVERY EVENING    metoprolol succinate (TOPROL-XL) 25 MG 24 hr tablet Take 1 tablet (25 mg total) by mouth once daily.            While in the hospital, will manage blood pressure as follows; Continue home antihypertensive regimen    Will utilize p.r.n. blood pressure medication only if patient's blood pressure greater than 180/110 and he develops symptoms such as worsening chest pain or shortness of breath.    Acute hypoxic respiratory failure  Patient with Hypoxic Respiratory failure which is Acute.  he is not on home oxygen. Supplemental oxygen was provided and noted-    Signs/symptoms of respiratory failure include- tachypnea, increased work of breathing, and wheezing.     -developed acute dyspnea and hypoxia during 2nd unit blood transfusion.   -CXR showed increased pulmonary edema   -Worsening dyspnea and new chest pain on 5/31, given 80 IV lasix with improvement. Increase IV lasix to 80 mg TID. Suspect his worsening hypoxemia likely from pulmonary edema. PE is a consideration but less likely given supratherapeutic INR.  -Wean oxygen as  tolerated.       Paroxysmal atrial fibrillation  Patient with Paroxysmal (<7 days) atrial fibrillation which is controlled currently with Beta Blocker. Patient is currently in atrial fibrillation.[id number] Score: 4. HASBLED Score: . Anticoagulation indicated. Anticoagulation done with coumadin in setting of mechanical aortic valve .    Coronary artery disease involving native coronary artery of native heart without angina pectoris  Patient with known CAD s/p stent placement and CABG, which is controlled Will continue  beta blocker and Statin and monitor for S/Sx of angina/ACS. Continue to monitor on telemetry.     Acute kidney injury superimposed on chronic kidney disease  Baseline 2.3 to 2.5; 3.8 on admission.     -Monitor UOP and kidney function.   -Improving with diuresis.     Epistaxis  -long standing with daily epistaxis while on coumadin   -seen by ENT with multiple ligation and cauterization (last on 5/21 during clinic visit)  -no active bleeding now but high risk for recurrence   -will consult ENT   -afrin prn   -advised not to blow nose and sneeze with mouth open       H/O mechanical aortic valve replacement  -done in 1990s   -on chronic coumadin c/b long standing epistaxis   -takes coumadin 5 mg nightly and last taken 5/28 with goal INR 2-3 per patient   -INR elevated to 8 and received vitamin K 5 mg in ED  -holding coumadin with plan to restart after INR < 3        Chronic anticoagulation  -Holding AC in the setting of supratherapeutic INR.        VTE Risk Mitigation (From admission, onward)           Ordered     IP VTE HIGH RISK PATIENT  Once         05/30/24 0634     Place sequential compression device  Until discontinued         05/30/24 0634     Place sequential compression device  Until discontinued         05/30/24 0057                    Discharge Planning   ASTER: 6/1/2024     Code Status: Full Code   Is the patient medically ready for discharge?:     Reason for patient still in hospital (select  all that apply): Patient trending condition  Discharge Plan A: Home, Home with family, Home Health        Jazzmine Benson MD  Department of Hospital Medicine   [facility] - Cardiology Stepdown

## 2024-06-01 LAB
ALBUMIN SERPL BCP-MCNC: 3 G/DL (ref 3.5–5.2)
ALBUMIN SERPL BCP-MCNC: 3 G/DL (ref 3.5–5.2)
ANION GAP SERPL CALC-SCNC: 10 MMOL/L (ref 8–16)
ANION GAP SERPL CALC-SCNC: 11 MMOL/L (ref 8–16)
APTT PPP: 41.7 SEC (ref 21–32)
BASOPHILS # BLD AUTO: 0.01 K/UL (ref 0–0.2)
BASOPHILS NFR BLD: 0.2 % (ref 0–1.9)
BUN SERPL-MCNC: 59 MG/DL (ref 8–23)
BUN SERPL-MCNC: 63 MG/DL (ref 8–23)
CALCIUM SERPL-MCNC: 9.6 MG/DL (ref 8.7–10.5)
CALCIUM SERPL-MCNC: 9.7 MG/DL (ref 8.7–10.5)
CHLORIDE SERPL-SCNC: 109 MMOL/L (ref 95–110)
CHLORIDE SERPL-SCNC: 109 MMOL/L (ref 95–110)
CO2 SERPL-SCNC: 20 MMOL/L (ref 23–29)
CO2 SERPL-SCNC: 22 MMOL/L (ref 23–29)
CREAT SERPL-MCNC: 3.3 MG/DL (ref 0.5–1.4)
CREAT SERPL-MCNC: 3.5 MG/DL (ref 0.5–1.4)
DIFFERENTIAL METHOD BLD: ABNORMAL
EOSINOPHIL # BLD AUTO: 0.1 K/UL (ref 0–0.5)
EOSINOPHIL NFR BLD: 1.6 % (ref 0–8)
ERYTHROCYTE [DISTWIDTH] IN BLOOD BY AUTOMATED COUNT: 21.3 % (ref 11.5–14.5)
EST. GFR  (NO RACE VARIABLE): 16.7 ML/MIN/1.73 M^2
EST. GFR  (NO RACE VARIABLE): 17.9 ML/MIN/1.73 M^2
GLUCOSE SERPL-MCNC: 114 MG/DL (ref 70–110)
GLUCOSE SERPL-MCNC: 84 MG/DL (ref 70–110)
HCT VFR BLD AUTO: 24.7 % (ref 40–54)
HGB BLD-MCNC: 7.5 G/DL (ref 14–18)
IMM GRANULOCYTES # BLD AUTO: 0.02 K/UL (ref 0–0.04)
IMM GRANULOCYTES NFR BLD AUTO: 0.4 % (ref 0–0.5)
INR PPP: 2.7 (ref 0.8–1.2)
LYMPHOCYTES # BLD AUTO: 0.6 K/UL (ref 1–4.8)
LYMPHOCYTES NFR BLD: 11.1 % (ref 18–48)
MAGNESIUM SERPL-MCNC: 2.1 MG/DL (ref 1.6–2.6)
MAGNESIUM SERPL-MCNC: 2.1 MG/DL (ref 1.6–2.6)
MCH RBC QN AUTO: 28.6 PG (ref 27–31)
MCHC RBC AUTO-ENTMCNC: 30.4 G/DL (ref 32–36)
MCV RBC AUTO: 94 FL (ref 82–98)
MONOCYTES # BLD AUTO: 0.6 K/UL (ref 0.3–1)
MONOCYTES NFR BLD: 10.9 % (ref 4–15)
NEUTROPHILS # BLD AUTO: 4.2 K/UL (ref 1.8–7.7)
NEUTROPHILS NFR BLD: 75.8 % (ref 38–73)
NRBC BLD-RTO: 0 /100 WBC
PHOSPHATE SERPL-MCNC: 4.6 MG/DL (ref 2.7–4.5)
PHOSPHATE SERPL-MCNC: 5.4 MG/DL (ref 2.7–4.5)
PLATELET # BLD AUTO: 159 K/UL (ref 150–450)
PMV BLD AUTO: 10.7 FL (ref 9.2–12.9)
POTASSIUM SERPL-SCNC: 3.8 MMOL/L (ref 3.5–5.1)
POTASSIUM SERPL-SCNC: 4 MMOL/L (ref 3.5–5.1)
PROTHROMBIN TIME: 28.2 SEC (ref 9–12.5)
RBC # BLD AUTO: 2.62 M/UL (ref 4.6–6.2)
SODIUM SERPL-SCNC: 140 MMOL/L (ref 136–145)
SODIUM SERPL-SCNC: 141 MMOL/L (ref 136–145)
TROPONIN I SERPL DL<=0.01 NG/ML-MCNC: 0.49 NG/ML (ref 0–0.03)
TROPONIN I SERPL DL<=0.01 NG/ML-MCNC: 0.57 NG/ML (ref 0–0.03)
WBC # BLD AUTO: 5.49 K/UL (ref 3.9–12.7)

## 2024-06-01 PROCEDURE — 97116 GAIT TRAINING THERAPY: CPT | Mod: HCNC

## 2024-06-01 PROCEDURE — 25000003 PHARM REV CODE 250: Mod: HCNC | Performed by: STUDENT IN AN ORGANIZED HEALTH CARE EDUCATION/TRAINING PROGRAM

## 2024-06-01 PROCEDURE — 97161 PT EVAL LOW COMPLEX 20 MIN: CPT | Mod: HCNC

## 2024-06-01 PROCEDURE — 20600001 HC STEP DOWN PRIVATE ROOM: Mod: HCNC

## 2024-06-01 PROCEDURE — 36415 COLL VENOUS BLD VENIPUNCTURE: CPT | Mod: HCNC | Performed by: STUDENT IN AN ORGANIZED HEALTH CARE EDUCATION/TRAINING PROGRAM

## 2024-06-01 PROCEDURE — 97530 THERAPEUTIC ACTIVITIES: CPT | Mod: HCNC

## 2024-06-01 PROCEDURE — 63600175 PHARM REV CODE 636 W HCPCS: Mod: HCNC | Performed by: STUDENT IN AN ORGANIZED HEALTH CARE EDUCATION/TRAINING PROGRAM

## 2024-06-01 PROCEDURE — 97165 OT EVAL LOW COMPLEX 30 MIN: CPT | Mod: HCNC

## 2024-06-01 PROCEDURE — 85730 THROMBOPLASTIN TIME PARTIAL: CPT | Mod: HCNC | Performed by: HOSPITALIST

## 2024-06-01 PROCEDURE — 80069 RENAL FUNCTION PANEL: CPT | Mod: HCNC | Performed by: STUDENT IN AN ORGANIZED HEALTH CARE EDUCATION/TRAINING PROGRAM

## 2024-06-01 PROCEDURE — 83735 ASSAY OF MAGNESIUM: CPT | Mod: 91,HCNC | Performed by: STUDENT IN AN ORGANIZED HEALTH CARE EDUCATION/TRAINING PROGRAM

## 2024-06-01 PROCEDURE — 85610 PROTHROMBIN TIME: CPT | Mod: HCNC | Performed by: HOSPITALIST

## 2024-06-01 PROCEDURE — 84484 ASSAY OF TROPONIN QUANT: CPT | Mod: HCNC | Performed by: PHYSICIAN ASSISTANT

## 2024-06-01 PROCEDURE — 25000003 PHARM REV CODE 250: Mod: HCNC | Performed by: HOSPITALIST

## 2024-06-01 PROCEDURE — 84484 ASSAY OF TROPONIN QUANT: CPT | Mod: 91,HCNC | Performed by: PHYSICIAN ASSISTANT

## 2024-06-01 PROCEDURE — 36415 COLL VENOUS BLD VENIPUNCTURE: CPT | Mod: HCNC,XB | Performed by: PHYSICIAN ASSISTANT

## 2024-06-01 PROCEDURE — 85025 COMPLETE CBC W/AUTO DIFF WBC: CPT | Mod: HCNC | Performed by: HOSPITALIST

## 2024-06-01 RX ORDER — POTASSIUM CHLORIDE 20 MEQ/1
40 TABLET, EXTENDED RELEASE ORAL ONCE
Status: COMPLETED | OUTPATIENT
Start: 2024-06-01 | End: 2024-06-01

## 2024-06-01 RX ORDER — WARFARIN 2.5 MG/1
2.5 TABLET ORAL DAILY
Status: DISCONTINUED | OUTPATIENT
Start: 2024-06-01 | End: 2024-06-02

## 2024-06-01 RX ADMIN — ALLOPURINOL 100 MG: 300 TABLET ORAL at 09:06

## 2024-06-01 RX ADMIN — FUROSEMIDE 80 MG: 10 INJECTION, SOLUTION INTRAVENOUS at 05:06

## 2024-06-01 RX ADMIN — FUROSEMIDE 80 MG: 10 INJECTION, SOLUTION INTRAVENOUS at 01:06

## 2024-06-01 RX ADMIN — ATORVASTATIN CALCIUM 40 MG: 40 TABLET, FILM COATED ORAL at 09:06

## 2024-06-01 RX ADMIN — WARFARIN SODIUM 2.5 MG: 2.5 TABLET ORAL at 09:06

## 2024-06-01 RX ADMIN — FUROSEMIDE 80 MG: 10 INJECTION, SOLUTION INTRAVENOUS at 10:06

## 2024-06-01 RX ADMIN — Medication 324 MG: at 09:06

## 2024-06-01 RX ADMIN — AMLODIPINE BESYLATE 5 MG: 5 TABLET ORAL at 09:06

## 2024-06-01 RX ADMIN — ISOSORBIDE MONONITRATE 60 MG: 60 TABLET, EXTENDED RELEASE ORAL at 08:06

## 2024-06-01 RX ADMIN — POTASSIUM CHLORIDE 40 MEQ: 1500 TABLET, EXTENDED RELEASE ORAL at 12:06

## 2024-06-01 NOTE — ASSESSMENT & PLAN NOTE
-long standing with daily epistaxis while on coumadin   -seen by ENT with multiple ligation and cauterization (last on 5/21 during clinic visit)  -no active bleeding now but high risk for recurrence   -ENT consulted; no intervention required. Recommendations noted below:  - Recommend against any instrumentation of the nose  - Recommend against nasal cannula - if patient requires supp O2, recommend humidified O2 via face tent or mask  - For further bleeding, recommend liberally spraying Afrin and holding manual pressure for 10 min. For further bleeding, page ENT on call. May require angiography and poss emobilization w IR as salvage treatment.

## 2024-06-01 NOTE — PT/OT/SLP EVAL
Occupational Therapy   Co-Evaluation & Treatment    Name: Dariel Urbina  MRN: [id number]  Admitting Diagnosis: Acute blood loss anemia  Recent Surgery: * No surgery found *      Recommendations:     Discharge Recommendations: Low Intensity Therapy  Discharge Equipment Recommendations:  none  Barriers to discharge:  None    Assessment:     Dariel Urbina is a 83 y.o. male with a medical diagnosis of Acute blood loss anemia.  He presents with performance deficits affecting function: weakness, impaired balance, impaired endurance, impaired self care skills, impaired functional mobility, gait instability, impaired cardiopulmonary response to activity.  Pt tolerated tx and eval well, with good effort and motivation throughout. Pt able to walk to BR for ADLs then sit in bedside chair. Pt will continue to benefit from skilled OT services to address impairments listed above to maximize independence with ADLs and functional mobility to ensure safe return to PLOF.     Rehab Prognosis: Good; patient would benefit from acute skilled OT services to address these deficits and reach maximum level of function.       Plan:     Patient to be seen 4 x/week to address the above listed problems via self-care/home management, therapeutic activities, therapeutic exercises  Plan of Care Expires: 07/01/24  Plan of Care Reviewed with: patient, spouse    Subjective     Chief Complaint: none  Patient/Family Comments/goals: go home    Occupational Profile:  Living Environment: Lives with spouse SSH, 1STE  Bathroom set up- t/s combo with shower chair  Previous level of function: Ind with ADLs and mobility  Roles and Routines: currently driving, use to enjoy fishing  Equipment Used at Home: walker, rolling, shower chair, bedside commode  Assistance upon Discharge: spouse    Pain/Comfort:  Pain Rating 1: 0/10  Pain Rating Post-Intervention 1: 0/10    Patients cultural, spiritual, Worship conflicts given the current situation:  no    Objective:     Communicated with: RN prior to session.  Patient found right sidelying with telemetry, pulse ox (continuous), peripheral IV, oxygen upon OT entry to room.    General Precautions: Standard, fall  Orthopedic Precautions: N/A  Braces: N/A  Oxygen Device: simple face mask, 3L    Occupational Performance:    Bed Mobility:    Patient completed Rolling/Turning to Left with  stand by assistance  Patient completed Scooting/Bridging with stand by assistance  Patient completed Supine to Sit with stand by assistance    Functional Mobility/Transfers:  Patient completed Sit <> Stand Transfer with contact guard assistance  with  no assistive device   Patient completed Bed <> Chair Transfer using Step Transfer technique with contact guard assistance with no assistive device  Functional Mobility: Pt ambulated from bed>BR>chair with CGA and no AD to simulate home distances and maximize functional endurance required for community mobility.    Activities of Daily Living:  Toileting: contact guard assistance using urinal in stance, (A) needed managing gown    Cognitive/Visual Perceptual:  Cognitive/Psychosocial Skills:     -       Oriented to: Person, Place, and Situation   -       Follows Commands/attention:Follows two-step commands  -       Safety awareness/insight to disability: intact     Physical Exam:  Upper Extremity Range of Motion:     -       Right Upper Extremity: WFL  -       Left Upper Extremity: WFL  Upper Extremity Strength:    -       Right Upper Extremity: WFL  -       Left Upper Extremity: WFL   Strength:    -       Right Upper Extremity: WFL  -       Left Upper Extremity: WFL    AMPAC 6 Click ADL:  AMPAC Total Score: 24    Treatment & Education:  Pt educated on   Role of OT and OT POC  Safe transfer techniques and proper body mechanics for fall prevention and improved independence with functional transfers  Importance of OOB activities to increase endurance and tolerance for increased  participation in daily ADLs    Utilizing the call bell to request for assistance with all functional mobility to ensure safety during hospital stay.    Pt verbalized understanding and all questions were addressed within the scope of OT.     Patient left up in chair with all lines intact, call button in reach, and spouse present    GOALS:   Multidisciplinary Problems       Occupational Therapy Goals          Problem: Occupational Therapy    Goal Priority Disciplines Outcome Interventions   Occupational Therapy Goal     OT, PT/OT Progressing    Description: Goals to be met by: 7/1/24     Patient will increase functional independence with ADLs by performing:    UE Dressing with [employer].  LE Dressing with [employer].  Grooming while standing with [employer].  Toileting from toilet with [employer] for hygiene and clothing management.   Step transfer with [place]  Toilet transfer to toilet with [employer].                         History:     Past Medical History:   Diagnosis Date    Anemia of chronic renal failure, stage 3 (moderate) 05/27/2015    Anticoagulant long-term use     Atherosclerosis of coronary artery bypass graft of native heart without angina pectoris 09/11/2012    3-27-18 [facility] Two vessel coronary artery disease.   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    Bilateral carotid artery disease 02/09/2017    Bleeding from the nose     Bleeding nose 03/21/2018    Cancer     Cataract     CHF (congestive heart failure)     CKD (chronic kidney disease) stage 3, GFR 30-59 ml/min 05/27/2015    Claudication of left lower extremity 09/17/2014    Colon polyp     Encounter for blood transfusion     Gastroesophageal reflux disease without esophagitis 03/19/2018    Gastrointestinal hemorrhage associated with intestinal diverticulosis 04/01/2018    Glaucoma     H/O mechanical aortic valve replacement 09/17/2014    History of gout 09/26/2012    Hyperparathyroidism due to renal  insufficiency 07/27/2015    Internal hemorrhoid 04/03/2018    Long term current use of anticoagulant therapy 09/26/2012    Mechanical heart valve present     Metabolic acidosis with normal anion gap and bicarbonate losses 03/20/2018    Mixed hyperlipidemia 09/26/2012    NSTEMI (non-ST elevated myocardial infarction) 03/21/2018    Obesity, diabetes, and hypertension syndrome 02/23/2016    Paroxysmal atrial fibrillation 02/06/2023    PVD (peripheral vascular disease) 09/11/2012    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         Past Surgical History:   Procedure Laterality Date    BACK SURGERY      CARDIAC CATHETERIZATION      CARDIAC VALVE REPLACEMENT      CARDIAC VALVE SURGERY      CARPAL TUNNEL RELEASE Right 05/19/2020    Procedure: RELEASE, CARPAL TUNNEL;  Surgeon: Rupesh Norris Jr., MD;  Location: [facility];  Service: Plastics;  Laterality: Right;    CATARACT EXTRACTION Left 11/13/2022        COLON SURGERY      COLONOSCOPY N/A 03/31/2017    Procedure: COLONOSCOPY;  Surgeon: Bruno Raymond MD;  Location: [facility] (4TH FLR);  Service: Endoscopy;  Laterality: N/A;  Patient's wife requesting date.    COLONOSCOPY N/A 04/03/2018    Procedure: COLONOSCOPY;  Surgeon: Bonifacio Pelletier MD;  Location: [facility] ENDO (2ND FLR);  Service: Endoscopy;  Laterality: N/A;    COLONOSCOPY N/A 08/13/2018    Procedure: COLONOSCOPY;  Surgeon: Kam Barba MD;  Location: [facility] ENDO (2ND FLR);  Service: Endoscopy;  Laterality: N/A;  2nd floor: PA pressure 49; hx of moderate-severe valve disease     per Coumadin clinic-Patient can hold 5 days with lovenox bridge       ok to schedule per Katarina     CORONARY ANGIOGRAPHY N/A 10/04/2021    Procedure: Left heart cath +/- peripheral angiogram;  Surgeon: Jose Ruiz MD;  Location: [facility] CATH LAB;  Service: Cardiology;  Laterality: N/A;    CORONARY ANGIOGRAPHY N/A 3/2/2023    Procedure: Angiogram, Coronary;  Surgeon: Devon Garnica MD;  Location: [facility] CATH LAB;  Service: Cardiology;  Laterality: N/A;    CORONARY ANGIOPLASTY      CORONARY ARTERY BYPASS GRAFT      CORONARY BYPASS GRAFT ANGIOGRAPHY  10/04/2021    Procedure: Bypass graft study;  Surgeon: Jose Ruiz MD;  Location: [facility] CATH LAB;  Service: Cardiology;;    CORONARY BYPASS GRAFT ANGIOGRAPHY  3/2/2023    Procedure: Bypass graft study;  Surgeon: Devon Garnica MD;  Location: [facility] CATH LAB;  Service: Cardiology;;    ECHOCARDIOGRAM,TRANSESOPHAGEAL N/A 4/14/2023    Procedure: Transesophageal echo (KIRSTEN) intra-procedure log documentation;  Surgeon: [facility] Diagnostic Provider;  Location: [facility] EP LAB;  Service: Cardiology;  Laterality: N/A;    ENDOSCOPIC NASAL CAUTERIZATION Bilateral 11/3/2023    Procedure: CAUTERIZATION, NOSE, ENDOSCOPIC;  Surgeon: Jono Russell MD;  [facility];  Service: ENT;  Laterality: Bilateral;    ENDOSCOPIC NASAL CAUTERIZATION N/A 12/18/2023    Procedure: CAUTERIZATION, NOSE, ENDOSCOPIC;  Surgeon: Jono Russell MD;  Location: [facility] OR 2ND FLR;  Service: ENT;  Laterality: N/A;    EYE SURGERY      FESS, WITH IMAGING GUIDANCE Left 2/28/2024    Procedure: FESS, WITH IMAGING GUIDANCE;  Surgeon: Jono Russell MD;  Location: [facility] OR [facility] FLR;  Service: ENT;  Laterality: Left;  Scan loaded ENT [employer]. CL    FUNCTIONAL ENDOSCOPIC SINUS SURGERY (FESS) Bilateral 6/14/2023    Procedure: FESS (FUNCTIONAL ENDOSCOPIC SINUS SURGERY);  Surgeon: Jono Russell MD;  [facility];  Service: ENT;  Laterality: Bilateral;    HERNIA REPAIR      INTRAOCULAR PROSTHESES INSERTION Left 11/13/2022    Procedure: INSERTION, IOL PROSTHESIS;  Surgeon: Alia Mckeon MD;   Location: [facility] OR 1ST FLR;  Service: Ophthalmology;  Laterality: Left;    INTRAOCULAR PROSTHESES INSERTION Right 12/04/2022    Procedure: INSERTION, IOL PROSTHESIS;  Surgeon: Alia Mckeon MD;  Location: [facility] OR 1ST FLR;  Service: Ophthalmology;  Laterality: Right;    LIGATION, ARTERY, ETHMOIDAL Left 2/28/2024    Procedure: LIGATION, ARTERY, ETHMOIDAL;  Surgeon: Jono Russell MD;  Location: [facility] OR 2ND FLR;  Service: ENT;  Laterality: Left;    LIGATION, ARTERY, SPHENOPALATINE, ENDOSCOPIC Bilateral 6/14/2023    Procedure: LIGATION, ARTERY, SPHENOPALATINE, ENDOSCOPIC;  Surgeon: Jono Russell MD;  Location: [facility] OR 2ND FLR;  Service: ENT;  Laterality: Bilateral;    PHACOEMULSIFICATION OF CATARACT Left 11/13/2022    Procedure: PHACOEMULSIFICATION, CATARACT;  Surgeon: Alia Mckeon MD;  [facility];  Service: Ophthalmology;  Laterality: Left;    PHACOEMULSIFICATION OF CATARACT Right 12/04/2022    Procedure: PHACOEMULSIFICATION, CATARACT;  Surgeon: Alia Mckeon MD;  [facility];  Service: Ophthalmology;  Laterality: Right;    SPINE SURGERY      TRANSESOPHAGEAL ECHOCARDIOGRAPHY N/A 3/22/2023    Procedure: ECHOCARDIOGRAM, TRANSESOPHAGEAL;  Surgeon: Thuan Diagnostic Provider;  Location: [facility] EP LAB;  Service: Anesthesiology;  Laterality: N/A;    TRANSESOPHAGEAL ECHOCARDIOGRAPHY N/A 4/11/2023    Procedure: ECHOCARDIOGRAM, TRANSESOPHAGEAL;  Surgeon: Thuan Diagnostic Provider;  Location: [facility] EP LAB;  Service: Anesthesiology;  Laterality: N/A;    VASECTOMY         Time Tracking:     OT Date of Treatment: 06/01/24  OT Start Time: 0833  OT Stop Time: 0849  OT Total Time (min): 16 min    Billable Minutes:Evaluation 6  Therapeutic Activity 10    6/1/2024

## 2024-06-01 NOTE — SUBJECTIVE & OBJECTIVE
Interval History:   No episodes of chest pain or SOB overnight. Troponin trended up 0.566 overnight. Overnight provider discussed with cardiology, suspected demand ischemia. He is currently CP and satting 97% on 2 L NC. BM last night which was black/dark greenish in appearance. Patient states this is typical of his bowel movements as he is on oral iron.     Review of Systems   Constitutional:  Negative for chills and fever.   Respiratory:  Negative for chest tightness and shortness of breath.    Cardiovascular:  Negative for chest pain, palpitations and leg swelling.   Gastrointestinal:  Negative for abdominal pain, constipation, nausea and vomiting.     Objective:     Vital Signs (Most Recent):  Temp: 97.4 °F (36.3 °C) (06/01/24 1104)  Pulse: (!) 50 (06/01/24 1104)  Resp: 17 (06/01/24 1104)  BP: (!) 130/53 (06/01/24 1104)  SpO2: 100 % (06/01/24 1104) Vital Signs (24h Range):  Temp:  [97 °F (36.1 °C)-98.1 °F (36.7 °C)] 97.4 °F (36.3 °C)  Pulse:  [50-65] 50  Resp:  [17-20] 17  SpO2:  [91 %-100 %] 100 %  BP: (121-153)/(45-65) 130/53     Weight: 78.3 kg (172 lb 9.9 oz)  Body mass index is 28.73 kg/m².    Intake/Output Summary (Last 24 hours) at 6/1/2024 1111  Last data filed at 6/1/2024 0924  Gross per 24 hour   Intake 1579 ml   Output 2300 ml   Net -721 ml         Physical Exam  Vitals reviewed.   Constitutional:       General: He is not in acute distress.     Appearance: He is ill-appearing.   HENT:      Head: Normocephalic.   Eyes:      Extraocular Movements: Extraocular movements intact.   Cardiovascular:      Rate and Rhythm: Normal rate. Rhythm irregular.   Pulmonary:      Effort: No respiratory distress.      Breath sounds: Rales present. No wheezing.   Abdominal:      General: There is no distension.      Palpations: Abdomen is soft.      Tenderness: There is no abdominal tenderness. There is no guarding.   Musculoskeletal:      Cervical back: Normal range of motion.      Right lower leg: No edema.      Left  lower leg: No edema.   Skin:     Comments: Small wound to L cheek, bandage in place. No active bleeding.   Neurological:      Mental Status: He is alert. Mental status is at baseline.           Significant Labs: All pertinent labs within the past 24 hours have been reviewed.

## 2024-06-01 NOTE — ASSESSMENT & PLAN NOTE
Advance Care Planning     Date: 05/31/2024    [facility]  I engaged the patient in a voluntary conversation about advance care planning and we specifically addressed what the goals of care would be moving forward, in light of the patient's change in clinical status, specifically if his heart or breathing stops.  We did specifically address the patient's likely prognosis, which is fair .  We explored the patient's values and preferences for future care.  The patient endorses that what is most important right now is to focus on remaining as independent as possible, symptom/pain control, and extending life as long as possible, even it it means sacrificing quality    Accordingly, we have decided that the best plan to meet the patient's goals includes continuing with treatment and Full code.     I spent a total of 10 minutes engaging the patient in this advance care planning discussion.

## 2024-06-01 NOTE — ASSESSMENT & PLAN NOTE
Chronic, controlled. Latest blood pressure and vitals reviewed-     Temp:  [97 °F (36.1 °C)-98.1 °F (36.7 °C)]   Pulse:  [50-65]   Resp:  [17-20]   BP: (121-153)/(45-65)   SpO2:  [91 %-100 %] .   Home meds for hypertension were reviewed and noted below.   Hypertension Medications               amLODIPine (NORVASC) 5 MG tablet Take 1 tablet (5 mg total) by mouth once daily.    bumetanide (BUMEX) 1 MG tablet Take 1 tablet (1 mg total) by mouth every other day.    isosorbide mononitrate (IMDUR) 60 MG 24 hr tablet TAKE 1 TABLET EVERY EVENING    metoprolol succinate (TOPROL-XL) 25 MG 24 hr tablet Take 1 tablet (25 mg total) by mouth once daily.            -While in the hospital, will manage blood pressure as follows; Continue home antihypertensive regimen  -Holding PO Bumex as he is on IV lasix for volume overload.  -Will utilize p.r.n. blood pressure medication only if patient's blood pressure greater than 180/110 and he develops symptoms such as worsening chest pain or shortness of breath.

## 2024-06-01 NOTE — ASSESSMENT & PLAN NOTE
Patient with Hypoxic Respiratory failure which is Acute.  he is not on home oxygen. Supplemental oxygen was provided and noted-  Signs/symptoms of respiratory failure include- tachypnea, increased work of breathing, and wheezing.     -Developed acute dyspnea and hypoxia during 2nd unit blood transfusion. CXR showed increased pulmonary edema. Started on IV Lasix 40 mg BID but remained net positive on I&Os.  -Worsening dyspnea and new substernal chest pain on 5/31, given 80 IV lasix with improvement. IV lasix increased to 80 mg TID, with improvement in oxygen requirements and symptoms. Suspect his worsening hypoxemia likely from pulmonary edema.   -Continue aggressive diuresis.   -Caution with extra volume, as he becomes overloaded easily.  -Wean oxygen as tolerated.

## 2024-06-01 NOTE — PLAN OF CARE
Problem: Physical Therapy  Goal: Physical Therapy Goal  Description: PT goals to be met by: 7/1/24    Patient will perform supine <> sitting with supervision.  Patient will perform sit <> stand transitions with supervision.  Patient will perform transfers from bed <> chair or BSC with supervision.  Patient will ambulate 200 feet with supervision and no loss of balance.  Outcome: Progressing

## 2024-06-01 NOTE — ASSESSMENT & PLAN NOTE
Patient with Paroxysmal (<7 days) atrial fibrillation which is controlled currently with Beta Blocker. Patient is currently in atrial fibrillation.[id number] Score: 4. HASBLED Score: . Anticoagulation indicated. Anticoagulation done with coumadin in setting of mechanical aortic valve .

## 2024-06-01 NOTE — ASSESSMENT & PLAN NOTE
Patient's anemia is currently uncontrolled. Has received 1 units of PRBCs on 5/29/24 . Etiology likely d/t acute blood loss which was from epistaxis.  Current CBC reviewed-   Lab Results   Component Value Date    HGB 7.5 (L) 06/01/2024    HCT 24.7 (L) 06/01/2024     -Now s/p 2 U pRBCs. Hgb 7.5 this morning. No further episodes of epistaxis or bleeding from facial abrasion.   -Black/dark green appearing stool on rectal exam, which is normal for him given iron supplementation.   -If H&H continue to downtrend, would restart IV PPI 40 mg BID and contact GI for possible EGD. His respiratory status and INR may prevent intervention.   -Could also consider obtaining CTAP w/o to rule out hematoma.  -Caution with extra volume given respiratory status.

## 2024-06-01 NOTE — PT/OT/SLP EVAL
Physical Therapy Evaluation    Patient Name:  Dariel Urbina   MRN:  [id number]  Admit Date: 5/29/2024  Admitting Diagnosis:  Acute blood loss anemia  Length of Stay: 3 days  Recent Surgery: * No surgery found *      Recommendations:     Discharge Recommendations:  Low intensity therapy at discharge  Discharge Equipment Recommendations: none   Barriers to discharge: none    Highest Level of Mobility: walked ~20 feet  Assistance Needed: minimal assistance    Assessment:     Dariel Urbina is a 83 y.o. male admitted with a medical diagnosis of Acute blood loss anemia. Medical history includes CAD, a-fib, diabetes, and bleeding. He is most limited today by poor activity tolerance. Today, he was able to perform bed mobility, standing, transfers, and gait training. Based on clinical presentation, co-morbidities, and today's performance, patient would benefit from acute skilled physical therapy services to improve functional mobility and return to max capacity prior level of function. Patient currently demonstrates a need for low intensity therapy services after discharging from the hospital.  See detailed evaluation below:    Problem List: weakness, impaired endurance, impaired self care skills, impaired functional mobility, gait instability, impaired balance, decreased safety awareness, edema, impaired cardiopulmonary response to activity  Rehab Prognosis: Good    Plan:     During this hospitalization, patient to be seen 4 x/week to address the identified impairments via gait training, therapeutic activities, therapeutic exercises, neuromuscular re-education and progress towards the established goals.    Plan of Care Expires:  07/01/24    Subjective   Communicated with RN prior to session.  Patient found HOB elevated upon PT entry to room, agreeable to evaluation.     Chief Complaint: Abnormal Lab (Low hgb , been having nose bleeds but not now) and Chest Pain    Patient/Family Comments/goals: to go  "home  Pain/Comfort:  Pain Rating 1: 0/10  Pain Rating Post-Intervention 1: 0/10    Living Environment:  Patient lives with his wife in a single story home with 1 small step to enter.     Prior Level of Function:   Patient reports being independent with mobility & with ADLs. Patient owns DME as follows: walker, rolling, shower chair, bedside commode. He does not use any of this equipment.    Roles/Repsonsibilities:   Work: Retired. Drive: yes.     Patient reports they will have assistance from family upon discharge.    Objective:   Patient found with: telemetry, pulse ox (continuous), peripheral IV, oxygen     General Precautions: Standard, fall   Orthopedic Precautions:N/A   Braces: N/A   Oxygen Device: full face mask - 3L  Vitals: BP (!) 130/53 (BP Location: Left arm, Patient Position: Lying)   Pulse (!) 50   Temp 97.4 °F (36.3 °C) (Axillary)   Resp 17   Ht 5' 5" (1.651 m)   Wt 78.3 kg (172 lb 9.9 oz)   SpO2 100%   BMI 28.73 kg/m²     Exams:  Cognition:   Alert and Cooperative  AxOx4  Command following: Follows multistep  commands  Fluency: clear  Hearing: Intact  Vision:  Intact visual fields  Skin Integrity: LE edema  Sensation: intact  Coordination: intact  LE Strength:  L Lower Extremity: WFL, except for hip extension 3/5 and hip flexion 3/5  R Lower Extremity: WFL, except for hip extension 3/5 and hip flexion 3/5  LE ROM:  L Lower Extremity: WFL  R Lower Extremity: WFL      Outcome Measures:  AM-PAC 6 CLICK MOBILITY  Turning over in bed (including adjusting bedclothes, sheets and blankets)?: 4  Sitting down on and standing up from a chair with arms (e.g., wheelchair, bedside commode, etc.): 3  Moving from lying on back to sitting on the side of the bed?: 4  Moving to and from a bed to a chair (including a wheelchair)?: 3  Need to walk in hospital room?: 3  Climbing 3-5 steps with a railing?: 3  Basic Mobility Total Score: 20     Functional Mobility:    Bed Mobility:  Supine to Sit: stand by " assistance  Scooting anteriorly to EOB to have both feet planted on floor: stand by assistance    Sitting Balance at Edge of Bed:  Assistance Level Required: Stand-by Assistance  Postural deviations noted: rounded shoulders and forward head  Cueing provided for: upright and midline sitting posture    Transfers:   Sit <> Stand Transfer: contact guard assistance with no assistive device   Standing Tolerance: contact guard assistance with no assistive device   Deviations: flexed posture  Bed <> Chair Transfer: Step Transfer technique with contact guard assistance with no assistive device    Gait:   Patient ambulated: ~20 feet   Patient required: CGA - min A  Patient with a loss of balance requiring assistance  Patient used: no assistive device  Gait Deviation(s): decreased speed, decreased step length, generalized instability, and narrow base of support  Cueing provided for: step sequencing, upright posture, and adequate step length    Neuro Re-Education:   Patient provided with the following neuro based interventions: verbal cueing and education for optimal posture and manual cueing and education for optimal posture    Therapeutic Activities:   Patient participated in the following therapeutic activities: bed mobility, standing transitions, transferring from one surface to another, and walking    Education:   Patient/family was educated on the following: role of PT, plan of care, and goals, in-room safety and use of call button, importance of upright mobility and exercise, balancing rest and activity, and deep breathing techniques      Patient left up in chair with all lines intact, call button in reach, and RN notified.    GOALS:   Multidisciplinary Problems       Physical Therapy Goals          Problem: Physical Therapy    Goal Priority Disciplines Outcome Goal Variances Interventions   Physical Therapy Goal     PT, PT/OT Progressing     Description: PT goals to be met by: 7/1/24    Patient will perform supine <>  sitting with supervision.  Patient will perform sit <> stand transitions with supervision.  Patient will perform transfers from bed <> chair or BSC with supervision.  Patient will ambulate 200 feet with supervision and no loss of balance.                       History:     Past Medical History:   Diagnosis Date    Anemia of chronic renal failure, stage 3 (moderate) 05/27/2015    Anticoagulant long-term use     Atherosclerosis of coronary artery bypass graft of native heart without angina pectoris 09/11/2012    3-27-18 [facility] Two vessel coronary artery disease.   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    Bilateral carotid artery disease 02/09/2017    Bleeding from the nose     Bleeding nose 03/21/2018    Cancer     Cataract     CHF (congestive heart failure)     CKD (chronic kidney disease) stage 3, GFR 30-59 ml/min 05/27/2015    Claudication of left lower extremity 09/17/2014    Colon polyp     Encounter for blood transfusion     Gastroesophageal reflux disease without esophagitis 03/19/2018    Gastrointestinal hemorrhage associated with intestinal diverticulosis 04/01/2018    Glaucoma     H/O mechanical aortic valve replacement 09/17/2014    History of gout 09/26/2012    Hyperparathyroidism due to renal insufficiency 07/27/2015    Internal hemorrhoid 04/03/2018    Long term current use of anticoagulant therapy 09/26/2012    Mechanical heart valve present     Metabolic acidosis with normal anion gap and bicarbonate losses 03/20/2018    Mixed hyperlipidemia 09/26/2012    NSTEMI (non-ST elevated myocardial infarction) 03/21/2018    Obesity, diabetes, and hypertension syndrome 02/23/2016    Paroxysmal atrial fibrillation 02/06/2023    PVD (peripheral vascular disease) 09/11/2012    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 02/01/2023    vertex scalp    Syncope 09/29/2022    Type 2 diabetes mellitus with diabetic peripheral angiopathy without gangrene  05/27/2015    Type 2 diabetes mellitus with stage 3 chronic kidney disease, without long-term current use of insulin 10/02/2013    Volume overload 5/30/2024       Past Surgical History:   Procedure Laterality Date    BACK SURGERY      CARDIAC CATHETERIZATION      CARDIAC VALVE REPLACEMENT      CARDIAC VALVE SURGERY      CARPAL TUNNEL RELEASE Right 05/19/2020    Procedure: RELEASE, CARPAL TUNNEL;  Surgeon: Rupesh Norris Jr., MD;  Location: [facility];  Service: Plastics;  Laterality: Right;    CATARACT EXTRACTION Left 11/13/2022        COLON SURGERY      COLONOSCOPY N/A 03/31/2017    Procedure: COLONOSCOPY;  Surgeon: Bruno Raymond MD;  Location: [facility] ENDO (4TH FLR);  Service: Endoscopy;  Laterality: N/A;  Patient's wife requesting date.    COLONOSCOPY N/A 04/03/2018    Procedure: COLONOSCOPY;  Surgeon: Bonifacio Pelletier MD;  Location: [facility] ENDO (2ND FLR);  Service: Endoscopy;  Laterality: N/A;    COLONOSCOPY N/A 08/13/2018    Procedure: COLONOSCOPY;  Surgeon: Kam Barba MD;  Location: [facility] ENDO (2ND FLR);  Service: Endoscopy;  Laterality: N/A;  2nd floor: PA pressure 49; hx of moderate-severe valve disease     per Coumadin clinic-Patient can hold 5 days with lovenox bridge       ok to schedule per Katarina    CORONARY ANGIOGRAPHY N/A 10/04/2021    Procedure: Left heart cath +/- peripheral angiogram;  Surgeon: Jose Ruiz MD;  Location: [facility] CATH LAB;  Service: Cardiology;  Laterality: N/A;    CORONARY ANGIOGRAPHY N/A 3/2/2023    Procedure: Angiogram, Coronary;  Surgeon: Devon Garnica MD;  Location: [facility] CATH LAB;  Service: Cardiology;  Laterality: N/A;    CORONARY ANGIOPLASTY      CORONARY ARTERY BYPASS GRAFT      CORONARY BYPASS GRAFT ANGIOGRAPHY  10/04/2021    Procedure: Bypass graft study;  Surgeon: Jose Ruiz MD;  Location: [facility] CATH LAB;  Service: Cardiology;;    CORONARY BYPASS GRAFT ANGIOGRAPHY  3/2/2023    Procedure: Bypass graft study;  Surgeon: Devon Garnica MD;  Location:  [facility] CATH LAB;  Service: Cardiology;;    ECHOCARDIOGRAM,TRANSESOPHAGEAL N/A 4/14/2023    Procedure: Transesophageal echo (KIRSTEN) intra-procedure log documentation;  Surgeon: Zenia Diagnostic Provider;  Location: [facility] EP LAB;  Service: Cardiology;  Laterality: N/A;    ENDOSCOPIC NASAL CAUTERIZATION Bilateral 11/3/2023    Procedure: CAUTERIZATION, NOSE, ENDOSCOPIC;  Surgeon: Jono Russell MD;  Location: [facility] OR 2ND FLR;  Service: ENT;  Laterality: Bilateral;    ENDOSCOPIC NASAL CAUTERIZATION N/A 12/18/2023    Procedure: CAUTERIZATION, NOSE, ENDOSCOPIC;  Surgeon: Jono Russell MD;  Location: [facility] OR 2ND FLR;  Service: ENT;  Laterality: N/A;    EYE SURGERY      FESS, WITH IMAGING GUIDANCE Left 2/28/2024    Procedure: FESS, WITH IMAGING GUIDANCE;  Surgeon: Jono Russell MD;  Location: [facility] OR 2ND FLR;  Service: ENT;  Laterality: Left;  Scan loaded ENT [employer]. CL    FUNCTIONAL ENDOSCOPIC SINUS SURGERY (FESS) Bilateral 6/14/2023    Procedure: FESS (FUNCTIONAL ENDOSCOPIC SINUS SURGERY);  Surgeon: Jono Russell MD;  Location: [facility] OR 2ND FLR;  Service: ENT;  Laterality: Bilateral;    HERNIA REPAIR      INTRAOCULAR PROSTHESES INSERTION Left 11/13/2022    Procedure: INSERTION, IOL PROSTHESIS;  Surgeon: Alia Mckeon MD;  Location: [facility] OR 1ST FLR;  Service: Ophthalmology;  Laterality: Left;    INTRAOCULAR PROSTHESES INSERTION Right 12/04/2022    Procedure: INSERTION, IOL PROSTHESIS;  Surgeon: Alia Mckeon MD;  Location: [facility] OR 1ST FLR;  Service: Ophthalmology;  Laterality: Right;    LIGATION, ARTERY, ETHMOIDAL Left 2/28/2024    Procedure: LIGATION, ARTERY, ETHMOIDAL;  Surgeon: Jono Russell MD;  Location: [facility] OR 2ND FLR;  Service: ENT;  Laterality: Left;    LIGATION, ARTERY, SPHENOPALATINE, ENDOSCOPIC Bilateral 6/14/2023    Procedure: LIGATION, ARTERY, SPHENOPALATINE, ENDOSCOPIC;  Surgeon: Jono Russell MD;  Location: [facility] OR 2ND FLR;  Service: ENT;  Laterality: Bilateral;     PHACOEMULSIFICATION OF CATARACT Left 11/13/2022    Procedure: PHACOEMULSIFICATION, CATARACT;  Surgeon: Alia Mckeon MD;  Location: [address];  Service: Ophthalmology;  Laterality: Left;    PHACOEMULSIFICATION OF CATARACT Right 12/04/2022    Procedure: PHACOEMULSIFICATION, CATARACT;  Surgeon: Alia Mckeon MD;  Location: [facility] OR [address] [place] [address];  Service: Ophthalmology;  Laterality: Right;    SPINE SURGERY      TRANSESOPHAGEAL ECHOCARDIOGRAPHY N/A 3/22/2023    Procedure: ECHOCARDIOGRAM, TRANSESOPHAGEAL;  Surgeon: [facility] Diagnostic Provider;  Location: [facility] EP LAB;  Service: Anesthesiology;  Laterality: N/A;    TRANSESOPHAGEAL ECHOCARDIOGRAPHY N/A 4/11/2023    Procedure: ECHOCARDIOGRAM, TRANSESOPHAGEAL;  Surgeon: [facility] Diagnostic Provider;  Location: [facility] EP LAB;  Service: Anesthesiology;  Laterality: N/A;    VASECTOMY         Time Tracking:     PT Received On: 06/01/24  PT Start Time: 0833     PT Stop Time: 0849  PT Total Time (min): 16 min     Additional staff present: OT - Co-evaluation with OT due to patient complexity and decreased activity tolerance. Patient required two skilled therapists to ensure safe mobilization.    Billable Minutes: Evaluation 8 and Gait Training 8    Amanda Jones, PT, DPT  6/1/2024

## 2024-06-01 NOTE — ASSESSMENT & PLAN NOTE
New onset substernal CP and SOB on the morning of 5/31. Improved with increasing IV lasix. EKG similar to prior. Troponin trend 0.057->0.101->0.338->0.566->0.495. On call provider overnight discussed with Cardiology given uptrending troponin, felt to be demand ischemia.     -Troponin peaked.  -Given resolution of sxs with increasing diuretics, suspect his troponin leak was demand from pulmonary edema along with slow clearance given renal dysfunction.

## 2024-06-01 NOTE — PROGRESS NOTES
[facility] - Cardiology [facility] Medicine  Progress Note    Patient Name: Dariel Urbina  MRN: [id number]  Patient Class: IP- Inpatient   Admission Date: 5/29/2024  Length of Stay: 3 days  Attending Physician: Jazzmine Salinas MD  Primary Care Provider: Devon Langston Jr., MD    Subjective:     Principal Problem:Acute blood loss anemia    HPI:  Dariel Urbina is an 83 year old male with a past medical history of CAD s/p CABG 1990's, mechanical AVR (on warfarin c/b long standing epistaxis) c/b mod-severe MR w/ mitraclip, afib, GIB 2/2 diverticulosis s/p partial colon resection, T2DM (diet controlled), gout, HLD, HTN, CKD4 (Cr 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 Patient states he went to coumadin clinic earlier today for lab works and later received a call from clinic about low Hgb and elevated INR. He reports increased fatigue, generalized weakness and dyspnea on exertion over last couple of weeks. He also reports poor appetite and 15 lbs weight loss over last 2 months. He reports daily spontaneous nosebleed for many years for which he follows with ENT and had ligation and cauterization (most recent last week). ENT told patient that this is going to be life long problem given patient needs to be on coumadin for mechanical aortic valve. He reports swallowing blood drom nose bleed. Patient states nose bleed stopped this afternoon before coming to hospital w/o recurrence so far. He reports bilateral leg swelling despite taking bumex at home and he thinks diuretic is not working as well as it did in the past. He denies bleeding from any other sites. His last admission for epistaxis was in December requiring cauterization.    ED course: afebrile and vitals stable. Hgb 5.6, INR 8.2, Cr 3.6. patient received 5 mg vitamin K and 1 unit blood transfusion in ED. During my  interview, patient is awake, conversant and not in distress. He is oxygenating well on room air.      KIRSTEN (4/14/23):  The left ventricle is normal in size with normal systolic function.The estimated ejection fraction is 60%.  Normal right ventricular size with normal right ventricular systolic function.  Moderate mitral regurgitation.  There is a mechanical aortic valve present. There is no aortic insufficiency present.  Plaque present in the transverse aorta.              Overview/Hospital Course:  Patient admitted to Hospital Medicine Team C on 5/29 with recurrent epistaxis, symptomatic anemia and supratherapeutic INR. He was given 1 U pRBCs for Hgb 5.6 with improvement to 7.0 and a dose of Vitamin K. Coumadin held on admission, INR downtrending, now 4.2. ENT consulted for epistaxis; recommended use of afrin and holding manual pressure should he continue to have bleeding. Elevated BNP >1000 on admission, started on IV lasix 40 mg BID. Hospital course complicated by JIM on CKD, with Cr >3. On 5/31, Hgb 6.6, 1 U pRBCs given. On the morning of 5/31, prior to receiving pRBCs, he acutely developed SOB and CP. EKG similar to prior. Troponin mildly elevated 0.057. Given concern for pulmonary edema, he was given IV 80 mg lasix with improvement in SOB and CP.     Interval History:   No episodes of chest pain or SOB overnight. Troponin trended up 0.566 overnight. Overnight provider discussed with cardiology, suspected demand ischemia. He is currently CP and satting 97% on 2 L NC. BM last night which was black/dark greenish in appearance. Patient states this is typical of his bowel movements as he is on oral iron.     Review of Systems   Constitutional:  Negative for chills and fever.   Respiratory:  Negative for chest tightness and shortness of breath.    Cardiovascular:  Negative for chest pain, palpitations and leg swelling.   Gastrointestinal:  Negative for abdominal pain, constipation, nausea and vomiting.      Objective:     Vital Signs (Most Recent):  Temp: 97.4 °F (36.3 °C) (06/01/24 1104)  Pulse: (!) 50 (06/01/24 1104)  Resp: 17 (06/01/24 1104)  BP: (!) 130/53 (06/01/24 1104)  SpO2: 100 % (06/01/24 1104) Vital Signs (24h Range):  Temp:  [97 °F (36.1 °C)-98.1 °F (36.7 °C)] 97.4 °F (36.3 °C)  Pulse:  [50-65] 50  Resp:  [17-20] 17  SpO2:  [91 %-100 %] 100 %  BP: (121-153)/(45-65) 130/53     Weight: 78.3 kg (172 lb 9.9 oz)  Body mass index is 28.73 kg/m².    Intake/Output Summary (Last 24 hours) at 6/1/2024 1111  Last data filed at 6/1/2024 0924  Gross per 24 hour   Intake 1579 ml   Output 2300 ml   Net -721 ml         Physical Exam  Vitals reviewed.   Constitutional:       General: He is not in acute distress.     Appearance: He is ill-appearing.   HENT:      Head: Normocephalic.   Eyes:      Extraocular Movements: Extraocular movements intact.   Cardiovascular:      Rate and Rhythm: Normal rate. Rhythm irregular.   Pulmonary:      Effort: No respiratory distress.      Breath sounds: Rales present. No wheezing.   Abdominal:      General: There is no distension.      Palpations: Abdomen is soft.      Tenderness: There is no abdominal tenderness. There is no guarding.   Musculoskeletal:      Cervical back: Normal range of motion.      Right lower leg: No edema.      Left lower leg: No edema.   Skin:     Comments: Small wound to L cheek, bandage in place. No active bleeding.   Neurological:      Mental Status: He is alert. Mental status is at baseline.           Significant Labs: All pertinent labs within the past 24 hours have been reviewed.      Assessment/Plan:      * Acute blood loss anemia  Patient's anemia is currently uncontrolled. Has received 1 units of PRBCs on 5/29/24 . Etiology likely d/t acute blood loss which was from epistaxis.  Current CBC reviewed-   Lab Results   Component Value Date    HGB 7.5 (L) 06/01/2024    HCT 24.7 (L) 06/01/2024     -Now s/p 2 U pRBCs. Hgb 7.5 this morning. No further episodes of  epistaxis or bleeding from facial abrasion.   -Black/dark green appearing stool on rectal exam, which is normal for him given iron supplementation.   -If H&H continue to downtrend, would restart IV PPI 40 mg BID and contact GI for possible EGD. His respiratory status and INR may prevent intervention.   -Could also consider obtaining CTAP w/o to rule out hematoma.  -Caution with extra volume given respiratory status.    Acute on chronic systolic heart failure  Echo    Result Date: 5/30/2024    Left Ventricle: The left ventricle is normal in size. Normal wall   thickness. There is concentric hypertrophy. Septal motion is consistent   with post-operative status. There is low normal systolic function with a   visually estimated ejection fraction of 50 - 55%. Biplane (2D) method of   discs ejection fraction is 51%.    Right Ventricle: Mild right ventricular enlargement. Wall thickness is   normal. Systolic function is normal.    Left Atrium: Left atrium is severely dilated.    Right Atrium: Right atrium is severely dilated.    Aortic Valve: There is a mechanical valve in the aortic position.   Aortic valve area by VTI is 0.87 cm². Aortic valve peak velocity is 3.02   m/s. Mean gradient is 19 mmHg. The dimensionless index is 0.31. There is   trace aortic regurgitation.    Mitral Valve: There is mild bileaflet sclerosis. There is moderate   mitral annular calcification present. There is moderate to severe   regurgitation with a centrally directed jet.    Tricuspid Valve: There is moderate regurgitation.    Pulmonary Artery: There is moderate pulmonary hypertension. The   estimated pulmonary artery systolic pressure is 57 mmHg.    IVC/SVC: Intermediate venous pressure at 8 mmHg.       -See acute hypoxemic respiratory failure.    ACP (advance care planning)  Advance Care Planning    Date: 05/31/2024    [facility]  I engaged the patient in a voluntary conversation about advance care planning and we specifically addressed what the  goals of care would be moving forward, in light of the patient's change in clinical status, specifically if his heart or breathing stops.  We did specifically address the patient's likely prognosis, which is fair .  We explored the patient's values and preferences for future care.  The patient endorses that what is most important right now is to focus on remaining as independent as possible, symptom/pain control, and extending life as long as possible, even it it means sacrificing quality    Accordingly, we have decided that the best plan to meet the patient's goals includes continuing with treatment and Full code.     I spent a total of 10 minutes engaging the patient in this advance care planning discussion.               Benign essential HTN  Chronic, controlled. Latest blood pressure and vitals reviewed-     Temp:  [97 °F (36.1 °C)-98.1 °F (36.7 °C)]   Pulse:  [50-65]   Resp:  [17-20]   BP: (121-153)/(45-65)   SpO2:  [91 %-100 %] .   Home meds for hypertension were reviewed and noted below.   Hypertension Medications               amLODIPine (NORVASC) 5 MG tablet Take 1 tablet (5 mg total) by mouth once daily.    bumetanide (BUMEX) 1 MG tablet Take 1 tablet (1 mg total) by mouth every other day.    isosorbide mononitrate (IMDUR) 60 MG 24 hr tablet TAKE 1 TABLET EVERY EVENING    metoprolol succinate (TOPROL-XL) 25 MG 24 hr tablet Take 1 tablet (25 mg total) by mouth once daily.            -While in the hospital, will manage blood pressure as follows; Continue home antihypertensive regimen  -Holding PO Bumex as he is on IV lasix for volume overload.  -Will utilize p.r.n. blood pressure medication only if patient's blood pressure greater than 180/110 and he develops symptoms such as worsening chest pain or shortness of breath.    Acute hypoxic respiratory failure  Patient with Hypoxic Respiratory failure which is Acute.  he is not on home oxygen. Supplemental oxygen was provided and noted-  Signs/symptoms of  respiratory failure include- tachypnea, increased work of breathing, and wheezing.     -Developed acute dyspnea and hypoxia during 2nd unit blood transfusion. CXR showed increased pulmonary edema. Started on IV Lasix 40 mg BID but remained net positive on I&Os.  -Worsening dyspnea and new substernal chest pain on 5/31, given 80 IV lasix with improvement. IV lasix increased to 80 mg TID, with improvement in oxygen requirements and symptoms. Suspect his worsening hypoxemia likely from pulmonary edema.   -Continue aggressive diuresis.   -Caution with extra volume, as he becomes overloaded easily.  -Wean oxygen as tolerated.       Paroxysmal atrial fibrillation  Patient with Paroxysmal (<7 days) atrial fibrillation which is controlled currently with Beta Blocker. Patient is currently in atrial fibrillation.[id number] Score: 4. HASBLED Score: . Anticoagulation indicated. Anticoagulation done with coumadin in setting of mechanical aortic valve .    Coronary artery disease involving native coronary artery of native heart without angina pectoris  Patient with known CAD s/p stent placement and CABG, which is controlled Will continue  beta blocker and Statin and monitor for S/Sx of angina/ACS. Continue to monitor on telemetry.     Acute kidney injury superimposed on chronic kidney disease  Baseline 2.3 to 2.5; 3.8 on admission. Suspect some component of cardiorenal and/or ATN.    -Monitor UOP and kidney function.   -Cr improving with IV lasix, now 3.3.     Epistaxis  -long standing with daily epistaxis while on coumadin   -seen by ENT with multiple ligation and cauterization (last on 5/21 during clinic visit)  -no active bleeding now but high risk for recurrence   -ENT consulted; no intervention required. Recommendations noted below:  - Recommend against any instrumentation of the nose  - Recommend against nasal cannula - if patient requires supp O2, recommend humidified O2 via face tent or mask  - For further bleeding,  recommend liberally spraying Afrin and holding manual pressure for 10 min. For further bleeding, page ENT on call. May require angiography and poss emobilization w IR as salvage treatment.      NSTEMI (non-ST elevated myocardial infarction)  New onset substernal CP and SOB on the morning of 5/31. Improved with increasing IV lasix. EKG similar to prior. Troponin trend 0.057->0.101->0.338->0.566->0.495. On call provider overnight discussed with Cardiology given uptrending troponin, felt to be demand ischemia.     -Troponin peaked.  -Given resolution of sxs with increasing diuretics, suspect his troponin leak was demand from pulmonary edema along with slow clearance given renal dysfunction.       H/O mechanical aortic valve replacement  -done in 1990s   -on chronic coumadin c/b long standing epistaxis   -takes coumadin 5 mg nightly and last taken 5/28 with goal INR 2-3 per patient   -INR elevated to 8 and received vitamin K 5 mg in ED  -Coumadin held given supratherapeutic INR. INR 2.7 on 6/01, will restart coumadin 2.5 mg.   -INR daily.         Chronic anticoagulation  -See AVR        VTE Risk Mitigation (From admission, onward)           Ordered     warfarin (COUMADIN) tablet 2.5 mg  Daily         06/01/24 0814     IP VTE HIGH RISK PATIENT  Once         05/30/24 0634     Place sequential compression device  Until discontinued         05/30/24 0634     Place sequential compression device  Until discontinued         05/30/24 0057                    Discharge Planning   ASTER: 6/4/2024     Code Status: Full Code   Is the patient medically ready for discharge?:     Reason for patient still in hospital (select all that apply): Patient trending condition, Treatment, and PT / OT recommendations  Discharge Plan A: Home, Home with family, Home Health            Jazzmine Benson MD  Department of Hospital Medicine   [facility] - Cardiology Step[facility]

## 2024-06-01 NOTE — PLAN OF CARE
Problem: Occupational Therapy  Goal: Occupational Therapy Goal  Description: Goals to be met by: 7/1/24     Patient will increase functional independence with ADLs by performing:    UE Dressing with [place].  LE Dressing with [place].  Grooming while standing with [place].  Toileting from toilet with [place] for hygiene and clothing management.   Step transfer with [place]  Toilet transfer to toilet with [place].    Outcome: Progressing

## 2024-06-01 NOTE — ASSESSMENT & PLAN NOTE
Baseline 2.3 to 2.5; 3.8 on admission. Suspect some component of cardiorenal and/or ATN.    -Monitor UOP and kidney function.   -Cr improving with IV lasix, now 3.3.

## 2024-06-01 NOTE — ASSESSMENT & PLAN NOTE
-done in 1990s   -on chronic coumadin c/b long standing epistaxis   -takes coumadin 5 mg nightly and last taken 5/28 with goal INR 2-3 per patient   -INR elevated to 8 and received vitamin K 5 mg in ED  -Coumadin held given supratherapeutic INR. INR 2.7 on 6/01, will restart coumadin 2.5 mg.   -INR daily.

## 2024-06-01 NOTE — PLAN OF CARE
Patient free of falls or injuries. Vitals stable, 3 L supplemental oxygen simple face mask maintaining spo2 > 92%. Afib on the cardiac monitor with HR intermittently bradycardic. Lasix IV push scheduled. Patient denied any chest pain, shortness of breath, or discomfort. Ambulates with SBA. Plan of care reviewed, all questions and concerns addressed. Care on going       Problem: Adult Inpatient Plan of Care  Goal: Plan of Care Review  6/1/2024 0711 by Juana Lira RN  Outcome: Progressing  6/1/2024 0440 by Juana Lira RN  Outcome: Progressing  Goal: Patient-Specific Goal (Individualized)  6/1/2024 0711 by Juana Lira RN  Outcome: Progressing  6/1/2024 0440 by Juana Lira RN  Outcome: Progressing  Goal: Absence of Hospital-Acquired Illness or Injury  6/1/2024 0711 by Juana Lira RN  Outcome: Progressing  6/1/2024 0440 by Jauna Lira RN  Outcome: Progressing  Goal: Optimal Comfort and Wellbeing  6/1/2024 0711 by Juana Lira RN  Outcome: Progressing  6/1/2024 0440 by Juana Lira RN  Outcome: Progressing  Goal: Readiness for Transition of Care  6/1/2024 0711 by Juana Lira RN  Outcome: Progressing  6/1/2024 0440 by Juana Lira RN  Outcome: Progressing     Problem: Diabetes Comorbidity  Goal: Blood Glucose Level Within Targeted Range  6/1/2024 0711 by Juana Lira RN  Outcome: Progressing  6/1/2024 0440 by Juana Lira RN  Outcome: Progressing     Problem: Fall Injury Risk  Goal: Absence of Fall and Fall-Related Injury  6/1/2024 0711 by Juana Lira RN  Outcome: Progressing  6/1/2024 0440 by Juana Lira RN  Outcome: Progressing     Problem: Acute Kidney Injury/Impairment  Goal: Fluid and Electrolyte Balance  6/1/2024 0711 by Juana Lira RN  Outcome: Progressing  6/1/2024 0440 by Juana Lira RN  Outcome: Progressing  Goal: Improved Oral Intake  6/1/2024 0711 by Juana Lira RN  Outcome: Progressing  6/1/2024 0440 by Juana Lira RN  Outcome: Progressing  Goal: Effective Renal  Function  6/1/2024 0711 by Juana Lira RN  Outcome: Progressing  6/1/2024 0440 by Juana Lira RN  Outcome: Progressing     Problem: Gas Exchange Impaired  Goal: Optimal Gas Exchange  6/1/2024 0711 by Juana Lira RN  Outcome: Progressing  6/1/2024 0440 by Juana Lira RN  Outcome: Progressing     Problem: Skin Injury Risk Increased  Goal: Skin Health and Integrity  6/1/2024 0711 by Juana Lira RN  Outcome: Progressing  6/1/2024 0440 by Juana Lira RN  Outcome: Progressing

## 2024-06-02 LAB
ALBUMIN SERPL BCP-MCNC: 3.1 G/DL (ref 3.5–5.2)
ALBUMIN SERPL BCP-MCNC: 3.2 G/DL (ref 3.5–5.2)
ANION GAP SERPL CALC-SCNC: 11 MMOL/L (ref 8–16)
ANION GAP SERPL CALC-SCNC: 12 MMOL/L (ref 8–16)
BASOPHILS # BLD AUTO: 0.03 K/UL (ref 0–0.2)
BASOPHILS NFR BLD: 0.5 % (ref 0–1.9)
BUN SERPL-MCNC: 59 MG/DL (ref 8–23)
BUN SERPL-MCNC: 61 MG/DL (ref 8–23)
CALCIUM SERPL-MCNC: 10.2 MG/DL (ref 8.7–10.5)
CALCIUM SERPL-MCNC: 9.8 MG/DL (ref 8.7–10.5)
CHLORIDE SERPL-SCNC: 108 MMOL/L (ref 95–110)
CHLORIDE SERPL-SCNC: 108 MMOL/L (ref 95–110)
CO2 SERPL-SCNC: 21 MMOL/L (ref 23–29)
CO2 SERPL-SCNC: 22 MMOL/L (ref 23–29)
CREAT SERPL-MCNC: 3.3 MG/DL (ref 0.5–1.4)
CREAT SERPL-MCNC: 3.3 MG/DL (ref 0.5–1.4)
DIFFERENTIAL METHOD BLD: ABNORMAL
EOSINOPHIL # BLD AUTO: 0.2 K/UL (ref 0–0.5)
EOSINOPHIL NFR BLD: 2.7 % (ref 0–8)
ERYTHROCYTE [DISTWIDTH] IN BLOOD BY AUTOMATED COUNT: 20.5 % (ref 11.5–14.5)
EST. GFR  (NO RACE VARIABLE): 17.9 ML/MIN/1.73 M^2
EST. GFR  (NO RACE VARIABLE): 17.9 ML/MIN/1.73 M^2
GLUCOSE SERPL-MCNC: 111 MG/DL (ref 70–110)
GLUCOSE SERPL-MCNC: 84 MG/DL (ref 70–110)
HCT VFR BLD AUTO: 24.9 % (ref 40–54)
HGB BLD-MCNC: 7.6 G/DL (ref 14–18)
IMM GRANULOCYTES # BLD AUTO: 0.03 K/UL (ref 0–0.04)
IMM GRANULOCYTES NFR BLD AUTO: 0.5 % (ref 0–0.5)
INR PPP: 2 (ref 0.8–1.2)
LYMPHOCYTES # BLD AUTO: 0.7 K/UL (ref 1–4.8)
LYMPHOCYTES NFR BLD: 13.1 % (ref 18–48)
MAGNESIUM SERPL-MCNC: 2.1 MG/DL (ref 1.6–2.6)
MAGNESIUM SERPL-MCNC: 2.1 MG/DL (ref 1.6–2.6)
MCH RBC QN AUTO: 29 PG (ref 27–31)
MCHC RBC AUTO-ENTMCNC: 30.5 G/DL (ref 32–36)
MCV RBC AUTO: 95 FL (ref 82–98)
MONOCYTES # BLD AUTO: 0.7 K/UL (ref 0.3–1)
MONOCYTES NFR BLD: 12.4 % (ref 4–15)
NEUTROPHILS # BLD AUTO: 3.9 K/UL (ref 1.8–7.7)
NEUTROPHILS NFR BLD: 70.8 % (ref 38–73)
NRBC BLD-RTO: 0 /100 WBC
PHOSPHATE SERPL-MCNC: 3.6 MG/DL (ref 2.7–4.5)
PHOSPHATE SERPL-MCNC: 4.4 MG/DL (ref 2.7–4.5)
PLATELET # BLD AUTO: 170 K/UL (ref 150–450)
PMV BLD AUTO: 10.9 FL (ref 9.2–12.9)
POTASSIUM SERPL-SCNC: 3.5 MMOL/L (ref 3.5–5.1)
POTASSIUM SERPL-SCNC: 4.2 MMOL/L (ref 3.5–5.1)
PROTHROMBIN TIME: 20.9 SEC (ref 9–12.5)
RBC # BLD AUTO: 2.62 M/UL (ref 4.6–6.2)
SODIUM SERPL-SCNC: 141 MMOL/L (ref 136–145)
SODIUM SERPL-SCNC: 141 MMOL/L (ref 136–145)
WBC # BLD AUTO: 5.48 K/UL (ref 3.9–12.7)

## 2024-06-02 PROCEDURE — 25000003 PHARM REV CODE 250: Mod: HCNC | Performed by: HOSPITALIST

## 2024-06-02 PROCEDURE — 83735 ASSAY OF MAGNESIUM: CPT | Mod: HCNC | Performed by: STUDENT IN AN ORGANIZED HEALTH CARE EDUCATION/TRAINING PROGRAM

## 2024-06-02 PROCEDURE — 36415 COLL VENOUS BLD VENIPUNCTURE: CPT | Mod: HCNC,XB | Performed by: STUDENT IN AN ORGANIZED HEALTH CARE EDUCATION/TRAINING PROGRAM

## 2024-06-02 PROCEDURE — 85025 COMPLETE CBC W/AUTO DIFF WBC: CPT | Mod: HCNC | Performed by: STUDENT IN AN ORGANIZED HEALTH CARE EDUCATION/TRAINING PROGRAM

## 2024-06-02 PROCEDURE — 80069 RENAL FUNCTION PANEL: CPT | Mod: HCNC | Performed by: STUDENT IN AN ORGANIZED HEALTH CARE EDUCATION/TRAINING PROGRAM

## 2024-06-02 PROCEDURE — 36415 COLL VENOUS BLD VENIPUNCTURE: CPT | Mod: HCNC | Performed by: HOSPITALIST

## 2024-06-02 PROCEDURE — 85610 PROTHROMBIN TIME: CPT | Mod: HCNC | Performed by: HOSPITALIST

## 2024-06-02 PROCEDURE — 25000003 PHARM REV CODE 250: Mod: HCNC | Performed by: STUDENT IN AN ORGANIZED HEALTH CARE EDUCATION/TRAINING PROGRAM

## 2024-06-02 PROCEDURE — 20600001 HC STEP DOWN PRIVATE ROOM: Mod: HCNC

## 2024-06-02 PROCEDURE — 63600175 PHARM REV CODE 636 W HCPCS: Mod: HCNC | Performed by: STUDENT IN AN ORGANIZED HEALTH CARE EDUCATION/TRAINING PROGRAM

## 2024-06-02 RX ORDER — FUROSEMIDE 10 MG/ML
80 INJECTION INTRAMUSCULAR; INTRAVENOUS 2 TIMES DAILY
Status: DISCONTINUED | OUTPATIENT
Start: 2024-06-02 | End: 2024-06-03 | Stop reason: HOSPADM

## 2024-06-02 RX ORDER — POTASSIUM CHLORIDE 20 MEQ/1
40 TABLET, EXTENDED RELEASE ORAL EVERY 4 HOURS
Status: COMPLETED | OUTPATIENT
Start: 2024-06-02 | End: 2024-06-02

## 2024-06-02 RX ORDER — WARFARIN SODIUM 5 MG/1
5 TABLET ORAL DAILY
Status: DISCONTINUED | OUTPATIENT
Start: 2024-06-02 | End: 2024-06-03 | Stop reason: HOSPADM

## 2024-06-02 RX ADMIN — POTASSIUM CHLORIDE 40 MEQ: 1500 TABLET, EXTENDED RELEASE ORAL at 03:06

## 2024-06-02 RX ADMIN — WARFARIN SODIUM 5 MG: 5 TABLET ORAL at 09:06

## 2024-06-02 RX ADMIN — POTASSIUM CHLORIDE 40 MEQ: 1500 TABLET, EXTENDED RELEASE ORAL at 10:06

## 2024-06-02 RX ADMIN — ATORVASTATIN CALCIUM 40 MG: 40 TABLET, FILM COATED ORAL at 08:06

## 2024-06-02 RX ADMIN — Medication 2 SPRAY: at 03:06

## 2024-06-02 RX ADMIN — FUROSEMIDE 80 MG: 10 INJECTION, SOLUTION INTRAVENOUS at 05:06

## 2024-06-02 RX ADMIN — ISOSORBIDE MONONITRATE 60 MG: 60 TABLET, EXTENDED RELEASE ORAL at 08:06

## 2024-06-02 RX ADMIN — ALLOPURINOL 100 MG: 300 TABLET ORAL at 08:06

## 2024-06-02 RX ADMIN — FUROSEMIDE 80 MG: 10 INJECTION, SOLUTION INTRAVENOUS at 06:06

## 2024-06-02 RX ADMIN — AMLODIPINE BESYLATE 5 MG: 5 TABLET ORAL at 08:06

## 2024-06-02 RX ADMIN — METOPROLOL SUCCINATE 25 MG: 25 TABLET, EXTENDED RELEASE ORAL at 08:06

## 2024-06-02 NOTE — ASSESSMENT & PLAN NOTE
Echo    Result Date: 5/30/2024    Left Ventricle: The left ventricle is normal in size. Normal wall   thickness. There is concentric hypertrophy. Septal motion is consistent   with post-operative status. There is low normal systolic function with a   visually estimated ejection fraction of 50 - 55%. Biplane (2D) method of   discs ejection fraction is 51%.    Right Ventricle: Mild right ventricular enlargement. Wall thickness is   normal. Systolic function is normal.    Left Atrium: Left atrium is severely dilated.    Right Atrium: Right atrium is severely dilated.    Aortic Valve: There is a mechanical valve in the aortic position.   Aortic valve area by VTI is 0.87 cm². Aortic valve peak velocity is 3.02   m/s. Mean gradient is 19 mmHg. The dimensionless index is 0.31. There is   trace aortic regurgitation.    Mitral Valve: There is mild bileaflet sclerosis. There is moderate   mitral annular calcification present. There is moderate to severe   regurgitation with a centrally directed jet.    Tricuspid Valve: There is moderate regurgitation.    Pulmonary Artery: There is moderate pulmonary hypertension. The   estimated pulmonary artery systolic pressure is 57 mmHg.    IVC/SVC: Intermediate venous pressure at 8 mmHg.       -See acute hypoxemic respiratory failure.

## 2024-06-02 NOTE — ASSESSMENT & PLAN NOTE
Chronic, controlled. Latest blood pressure and vitals reviewed-     Temp:  [97.3 °F (36.3 °C)-98.6 °F (37 °C)]   Pulse:  [50-67]   Resp:  [17-18]   BP: (123-143)/(48-95)   SpO2:  [89 %-100 %] .   Home meds for hypertension were reviewed and noted below.   Hypertension Medications               amLODIPine (NORVASC) 5 MG tablet Take 1 tablet (5 mg total) by mouth once daily.    bumetanide (BUMEX) 1 MG tablet Take 1 tablet (1 mg total) by mouth every other day.    isosorbide mononitrate (IMDUR) 60 MG 24 hr tablet TAKE 1 TABLET EVERY EVENING    metoprolol succinate (TOPROL-XL) 25 MG 24 hr tablet Take 1 tablet (25 mg total) by mouth once daily.          -While in the hospital, will manage blood pressure as follows; Continue home antihypertensive regimen  -Holding PO Bumex as he is on IV lasix for volume overload.  -Will utilize p.r.n. blood pressure medication only if patient's blood pressure greater than 180/110 and he develops symptoms such as worsening chest pain or shortness of breath.

## 2024-06-02 NOTE — ASSESSMENT & PLAN NOTE
Patient with Paroxysmal (<7 days) atrial fibrillation which is controlled currently with Beta Blocker. Patient is currently in atrial fibrillation.[id number] Score: 4. HASBLED Score: . Anticoagulation indicated. Anticoagulation done with coumadin in setting of mechanical aortic valve .

## 2024-06-02 NOTE — PLAN OF CARE
Problem: Adult Inpatient Plan of Care  Goal: Plan of Care Review  Outcome: Progressing  Goal: Patient-Specific Goal (Individualized)  Outcome: Progressing  Goal: Absence of Hospital-Acquired Illness or Injury  Outcome: Progressing  Goal: Optimal Comfort and Wellbeing  Outcome: Progressing  Goal: Readiness for Transition of Care  Outcome: Progressing     Problem: Diabetes Comorbidity  Goal: Blood Glucose Level Within Targeted Range  Outcome: Progressing     Problem: Fall Injury Risk  Goal: Absence of Fall and Fall-Related Injury  Outcome: Progressing

## 2024-06-02 NOTE — ASSESSMENT & PLAN NOTE
-done in 1990s   -on chronic coumadin c/b long standing epistaxis   -takes coumadin 5 mg nightly and last taken 5/28 with goal INR 2-3 per patient   -INR elevated to 8 and received vitamin K 5 mg in ED  -Coumadin held given supratherapeutic INR.   -INR 2.7 on 6/01, will restart coumadin 2.5 mg. Resumed home dose on 6/02.   -INR daily.

## 2024-06-02 NOTE — ASSESSMENT & PLAN NOTE
Patient with Hypoxic Respiratory failure which is Acute.  he is not on home oxygen. Supplemental oxygen was provided and noted-  Signs/symptoms of respiratory failure include- tachypnea, increased work of breathing, and wheezing.     -Developed acute dyspnea and hypoxia during 2nd unit blood transfusion. CXR showed increased pulmonary edema. Started on IV Lasix 40 mg BID but remained net positive on I&Os.  -Worsening dyspnea and new substernal chest pain on 5/31, given 80 IV lasix with improvement. IV lasix increased to 80 mg TID, with improvement in oxygen requirements and symptoms. Suspect his worsening hypoxemia likely from pulmonary edema. Now improvement in sxs and on room air, decreased lasix from 80 TID to BID as he still does have some rales on exam. Suspect he can be transitioned to PO lasix on 6/03 and discharge home.  -Caution with extra volume, as he becomes overloaded easily.  -Wean oxygen as tolerated.

## 2024-06-02 NOTE — PLAN OF CARE
Problem: Adult Inpatient Plan of Care  Goal: Plan of Care Review  6/2/2024 1842 by Ny Hung RN  Outcome: Progressing  6/2/2024 1842 by Ny Hung RN  Outcome: Progressing  Goal: Patient-Specific Goal (Individualized)  6/2/2024 1842 by Ny Hung RN  Outcome: Progressing  6/2/2024 1842 by Ny Hung RN  Outcome: Progressing  Goal: Absence of Hospital-Acquired Illness or Injury  6/2/2024 1842 by Ny Hung RN  Outcome: Progressing  6/2/2024 1842 by Ny Hung RN  Outcome: Progressing  Goal: Optimal Comfort and Wellbeing  6/2/2024 1842 by Ny Hung RN  Outcome: Progressing  6/2/2024 1842 by Ny Hung RN  Outcome: Progressing  Goal: Readiness for Transition of Care  6/2/2024 1842 by Ny Hung RN  Outcome: Progressing  6/2/2024 1842 by Ny Hung RN  Outcome: Progressing

## 2024-06-02 NOTE — SUBJECTIVE & OBJECTIVE
Interval History:   2.3 L UOP overnight, net -1.5 L. SOB has improved, now on room air. Hgb stable. Coumadin restarted on 6/01.     Review of Systems   Constitutional:  Negative for chills and fever.   Respiratory:  Negative for chest tightness and shortness of breath.    Cardiovascular:  Negative for chest pain, palpitations and leg swelling.   Gastrointestinal:  Negative for abdominal pain, constipation, nausea and vomiting.     Objective:     Vital Signs (Most Recent):  Temp: 98.6 °F (37 °C) (06/02/24 0755)  Pulse: 66 (06/02/24 0755)  Resp: 18 (06/02/24 0755)  BP: (!) 123/53 (06/02/24 0755)  SpO2: (!) 93 % (06/02/24 0755) Vital Signs (24h Range):  Temp:  [97.3 °F (36.3 °C)-98.6 °F (37 °C)] 98.6 °F (37 °C)  Pulse:  [50-67] 66  Resp:  [17-18] 18  SpO2:  [89 %-100 %] 93 %  BP: (123-143)/(48-95) 123/53     Weight: 72.6 kg (160 lb) (Standing scale)  Body mass index is 26.63 kg/m².    Intake/Output Summary (Last 24 hours) at 6/2/2024 0939  Last data filed at 6/2/2024 0421  Gross per 24 hour   Intake 490 ml   Output 2350 ml   Net -1860 ml         Physical Exam  Vitals reviewed.   Constitutional:       General: He is not in acute distress.     Appearance: He is ill-appearing.   HENT:      Head: Normocephalic.   Eyes:      Extraocular Movements: Extraocular movements intact.   Cardiovascular:      Rate and Rhythm: Normal rate. Rhythm irregular.   Pulmonary:      Effort: No respiratory distress.      Breath sounds: Rales (improving) present. No wheezing.   Abdominal:      General: There is no distension.      Palpations: Abdomen is soft.      Tenderness: There is no abdominal tenderness. There is no guarding.   Musculoskeletal:      Cervical back: Normal range of motion.      Right lower leg: No edema.      Left lower leg: No edema.   Skin:     Comments: Small wound to L cheek, bandage in place. No active bleeding.   Neurological:      Mental Status: He is alert. Mental status is at baseline.           Significant Labs:  All pertinent labs within the past 24 hours have been reviewed.

## 2024-06-02 NOTE — PROGRESS NOTES
[facility] Medicine  Progress Note    Patient Name: Dariel Urbina  MRN: [id number]  Patient Class: IP- Inpatient   Admission Date: 5/29/2024  Length of Stay: 4 days  Attending Physician: Jazzmine Salinas MD  Primary Care Provider: Devno Langston Jr., MD    Subjective:     Principal Problem:Acute blood loss anemia    HPI:  Dariel Urbina is an 83 year old male with a past medical history of CAD s/p CABG 1990's, mechanical AVR (on warfarin c/b long standing epistaxis) c/b mod-severe MR w/ mitraclip, afib, GIB 2/2 diverticulosis s/p partial colon resection, T2DM (diet controlled), gout, HLD, HTN, CKD4 (Cr 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 Patient states he went to coumadin clinic earlier today for lab works and later received a call from clinic about low Hgb and elevated INR. He reports increased fatigue, generalized weakness and dyspnea on exertion over last couple of weeks. He also reports poor appetite and 15 lbs weight loss over last 2 months. He reports daily spontaneous nosebleed for many years for which he follows with ENT and had ligation and cauterization (most recent last week). ENT told patient that this is going to be life long problem given patient needs to be on coumadin for mechanical aortic valve. He reports swallowing blood drom nose bleed. Patient states nose bleed stopped this afternoon before coming to hospital w/o recurrence so far. He reports bilateral leg swelling despite taking bumex at home and he thinks diuretic is not working as well as it did in the past. He denies bleeding from any other sites. His last admission for epistaxis was in December requiring cauterization.    ED course: afebrile and vitals stable. Hgb 5.6, INR 8.2, Cr 3.6. patient received 5 mg vitamin K and 1 unit blood transfusion in ED. During my  interview, patient is awake, conversant and not in distress. He is oxygenating well on room air.      KIRSTEN (4/14/23):  The left ventricle is normal in size with normal systolic function.The estimated ejection fraction is 60%.  Normal right ventricular size with normal right ventricular systolic function.  Moderate mitral regurgitation.  There is a mechanical aortic valve present. There is no aortic insufficiency present.  Plaque present in the transverse aorta.                Overview/Hospital Course:  Patient admitted to Hospital Medicine Team C on 5/29 with recurrent epistaxis, symptomatic anemia and supratherapeutic INR. He was given 1 U pRBCs for Hgb 5.6 with improvement to 7.0 and a dose of Vitamin K. Coumadin held on admission, INR downtrending, now 4.2. ENT consulted for epistaxis; recommended use of afrin and holding manual pressure should he continue to have bleeding. Elevated BNP >1000 on admission, started on IV lasix 40 mg BID. Hospital course complicated by JIM on CKD, with Cr >3. On 5/31, Hgb 6.6, 1 U pRBCs given. On the morning of 5/31, prior to receiving pRBCs, he acutely developed SOB and CP. EKG similar to prior. Given concern for pulmonary edema, he was given IV 80 mg lasix with improvement in SOB and CP. Troponin elevated with peak of 0.566, likely demand in the setting of pulmonary edema and resolution of symptoms with IV lasix. Adequate diuresis with IV lasix, weaned to room air. INR now subtherapeutic, restarted Coumadin on 6/01 at 2.5. Home dose resumed on 6/03. PT/OT recommending home with .     Interval History:   2.3 L UOP overnight, net -1.5 L. SOB has improved, now on room air. Hgb stable. Coumadin restarted on 6/01.     Review of Systems   Constitutional:  Negative for chills and fever.   Respiratory:  Negative for chest tightness and shortness of breath.    Cardiovascular:  Negative for chest pain, palpitations and leg swelling.   Gastrointestinal:  Negative for abdominal pain,  constipation, nausea and vomiting.     Objective:     Vital Signs (Most Recent):  Temp: 98.6 °F (37 °C) (06/02/24 0755)  Pulse: 66 (06/02/24 0755)  Resp: 18 (06/02/24 0755)  BP: (!) 123/53 (06/02/24 0755)  SpO2: (!) 93 % (06/02/24 0755) Vital Signs (24h Range):  Temp:  [97.3 °F (36.3 °C)-98.6 °F (37 °C)] 98.6 °F (37 °C)  Pulse:  [50-67] 66  Resp:  [17-18] 18  SpO2:  [89 %-100 %] 93 %  BP: (123-143)/(48-95) 123/53     Weight: 72.6 kg (160 lb) (Standing scale)  Body mass index is 26.63 kg/m².    Intake/Output Summary (Last 24 hours) at 6/2/2024 0964  Last data filed at 6/2/2024 0421  Gross per 24 hour   Intake 490 ml   Output 2350 ml   Net -1860 ml         Physical Exam  Vitals reviewed.   Constitutional:       General: He is not in acute distress.     Appearance: He is ill-appearing.   HENT:      Head: Normocephalic.   Eyes:      Extraocular Movements: Extraocular movements intact.   Cardiovascular:      Rate and Rhythm: Normal rate. Rhythm irregular.   Pulmonary:      Effort: No respiratory distress.      Breath sounds: Rales (improving) present. No wheezing.   Abdominal:      General: There is no distension.      Palpations: Abdomen is soft.      Tenderness: There is no abdominal tenderness. There is no guarding.   Musculoskeletal:      Cervical back: Normal range of motion.      Right lower leg: No edema.      Left lower leg: No edema.   Skin:     Comments: Small wound to L cheek, bandage in place. No active bleeding.   Neurological:      Mental Status: He is alert. Mental status is at baseline.           Significant Labs: All pertinent labs within the past 24 hours have been reviewed.      Assessment/Plan:      * Acute blood loss anemia  Patient's anemia is currently uncontrolled. Has received 1 units of PRBCs on 5/29/24 . Etiology likely d/t acute blood loss which was from epistaxis.  Current CBC reviewed-   Lab Results   Component Value Date    HGB 7.6 (L) 06/02/2024    HCT 24.9 (L) 06/02/2024     -Now s/p 2 U  pRBCs. Hgb 7.5 this morning. No further episodes of epistaxis or bleeding from facial abrasion.   -Black/dark green appearing stool on rectal exam, which is normal for him given iron supplementation.   -If H&H continue to downtrend, would restart IV PPI 40 mg BID and contact GI for possible EGD. His respiratory status and INR may prevent intervention.   -Could also consider obtaining CTAP w/o to rule out hematoma.  -Caution with extra volume given respiratory status.    Acute on chronic systolic heart failure  Echo    Result Date: 5/30/2024    Left Ventricle: The left ventricle is normal in size. Normal wall   thickness. There is concentric hypertrophy. Septal motion is consistent   with post-operative status. There is low normal systolic function with a   visually estimated ejection fraction of 50 - 55%. Biplane (2D) method of   discs ejection fraction is 51%.    Right Ventricle: Mild right ventricular enlargement. Wall thickness is   normal. Systolic function is normal.    Left Atrium: Left atrium is severely dilated.    Right Atrium: Right atrium is severely dilated.    Aortic Valve: There is a mechanical valve in the aortic position.   Aortic valve area by VTI is 0.87 cm². Aortic valve peak velocity is 3.02   m/s. Mean gradient is 19 mmHg. The dimensionless index is 0.31. There is   trace aortic regurgitation.    Mitral Valve: There is mild bileaflet sclerosis. There is moderate   mitral annular calcification present. There is moderate to severe   regurgitation with a centrally directed jet.    Tricuspid Valve: There is moderate regurgitation.    Pulmonary Artery: There is moderate pulmonary hypertension. The   estimated pulmonary artery systolic pressure is 57 mmHg.    IVC/SVC: Intermediate venous pressure at 8 mmHg.       -See acute hypoxemic respiratory failure.    ACP (advance care planning)  Advance Care Planning    Date: 05/31/2024    [facility]  I engaged the patient in a voluntary conversation about advance  care planning and we specifically addressed what the goals of care would be moving forward, in light of the patient's change in clinical status, specifically if his heart or breathing stops.  We did specifically address the patient's likely prognosis, which is fair .  We explored the patient's values and preferences for future care.  The patient endorses that what is most important right now is to focus on remaining as independent as possible, symptom/pain control, and extending life as long as possible, even it it means sacrificing quality    Accordingly, we have decided that the best plan to meet the patient's goals includes continuing with treatment and Full code.     I spent a total of 10 minutes engaging the patient in this advance care planning discussion.               Benign essential HTN  Chronic, controlled. Latest blood pressure and vitals reviewed-     Temp:  [97.3 °F (36.3 °C)-98.6 °F (37 °C)]   Pulse:  [50-67]   Resp:  [17-18]   BP: (123-143)/(48-95)   SpO2:  [89 %-100 %] .   Home meds for hypertension were reviewed and noted below.   Hypertension Medications               amLODIPine (NORVASC) 5 MG tablet Take 1 tablet (5 mg total) by mouth once daily.    bumetanide (BUMEX) 1 MG tablet Take 1 tablet (1 mg total) by mouth every other day.    isosorbide mononitrate (IMDUR) 60 MG 24 hr tablet TAKE 1 TABLET EVERY EVENING    metoprolol succinate (TOPROL-XL) 25 MG 24 hr tablet Take 1 tablet (25 mg total) by mouth once daily.          -While in the hospital, will manage blood pressure as follows; Continue home antihypertensive regimen  -Holding PO Bumex as he is on IV lasix for volume overload.  -Will utilize p.r.n. blood pressure medication only if patient's blood pressure greater than 180/110 and he develops symptoms such as worsening chest pain or shortness of breath.    Acute hypoxic respiratory failure  Patient with Hypoxic Respiratory failure which is Acute.  he is not on home oxygen. Supplemental  oxygen was provided and noted-  Signs/symptoms of respiratory failure include- tachypnea, increased work of breathing, and wheezing.     -Developed acute dyspnea and hypoxia during 2nd unit blood transfusion. CXR showed increased pulmonary edema. Started on IV Lasix 40 mg BID but remained net positive on I&Os.  -Worsening dyspnea and new substernal chest pain on 5/31, given 80 IV lasix with improvement. IV lasix increased to 80 mg TID, with improvement in oxygen requirements and symptoms. Suspect his worsening hypoxemia likely from pulmonary edema. Now improvement in sxs and on room air, decreased lasix from 80 TID to BID as he still does have some rales on exam. Suspect he can be transitioned to PO lasix on 6/03 and discharge home.  -Caution with extra volume, as he becomes overloaded easily.  -Wean oxygen as tolerated.       Paroxysmal atrial fibrillation  Patient with Paroxysmal (<7 days) atrial fibrillation which is controlled currently with Beta Blocker. Patient is currently in atrial fibrillation.[id number] Score: 4. HASBLED Score: . Anticoagulation indicated. Anticoagulation done with coumadin in setting of mechanical aortic valve .    Coronary artery disease involving native coronary artery of native heart without angina pectoris  Patient with known CAD s/p stent placement and CABG, which is controlled Will continue  beta blocker and Statin and monitor for S/Sx of angina/ACS. Continue to monitor on telemetry.     Acute kidney injury superimposed on chronic kidney disease  Baseline 2.3 to 2.5; 3.8 on admission. Suspect some component of cardiorenal and/or ATN.    -Monitor UOP and kidney function.   -Cr improving with IV lasix, now 3.3.     Epistaxis  -long standing with daily epistaxis while on coumadin   -seen by ENT with multiple ligation and cauterization (last on 5/21 during clinic visit)  -no active bleeding now but high risk for recurrence   -ENT consulted; no intervention required. Recommendations  noted below:  - Recommend against any instrumentation of the nose  - Recommend against nasal cannula - if patient requires supp O2, recommend humidified O2 via face tent or mask  - For further bleeding, recommend liberally spraying Afrin and holding manual pressure for 10 min. For further bleeding, page ENT on call. May require angiography and poss emobilization w IR as salvage treatment.      NSTEMI (non-ST elevated myocardial infarction)  New onset substernal CP and SOB on the morning of 5/31. Improved with increasing IV lasix. EKG similar to prior. Troponin trend 0.057->0.101->0.338->0.566->0.495. On call provider overnight discussed with Cardiology given uptrending troponin, felt to be demand ischemia.     -Troponin peaked.  -Given resolution of sxs with increasing diuretics, suspect his troponin leak was demand from pulmonary edema along with slow clearance given renal dysfunction.       H/O mechanical aortic valve replacement  -done in 1990s   -on chronic coumadin c/b long standing epistaxis   -takes coumadin 5 mg nightly and last taken 5/28 with goal INR 2-3 per patient   -INR elevated to 8 and received vitamin K 5 mg in ED  -Coumadin held given supratherapeutic INR.   -INR 2.7 on 6/01, will restart coumadin 2.5 mg. Resumed home dose on 6/02.   -INR daily.         Chronic anticoagulation  -See AVR        VTE Risk Mitigation (From admission, onward)           Ordered     warfarin (COUMADIN) tablet 5 mg  Daily         06/02/24 0851     IP VTE HIGH RISK PATIENT  Once         05/30/24 0634     Place sequential compression device  Until discontinued         05/30/24 0634                    Discharge Planning   ASTER: 6/3/2024     Code Status: Full Code   Is the patient medically ready for discharge?:     Reason for patient still in hospital (select all that apply): Patient trending condition and Treatment  Discharge Plan A: Home, Home with family, Home Health          Jazzmine Benson MD  Department of Hospital  Medicine   [facility] - Cardiology Stepdown

## 2024-06-02 NOTE — ASSESSMENT & PLAN NOTE
Advance Care Planning     Date: 05/31/2024    [facility]  I engaged the patient in a voluntary conversation about advance care planning and we specifically addressed what the goals of care would be moving forward, in light of the patient's change in clinical status, specifically if his heart or breathing stops.  We did specifically address the patient's likely prognosis, which is fair .  We explored the patient's values and preferences for future care.  The patient endorses that what is most important right now is to focus on remaining as independent as possible, symptom/pain control, and extending life as long as possible, even it it means sacrificing quality    Accordingly, we have decided that the best plan to meet the patient's goals includes continuing with treatment and Full code.     I spent a total of 10 minutes engaging the patient in this advance care planning discussion.

## 2024-06-02 NOTE — ASSESSMENT & PLAN NOTE
Patient's anemia is currently uncontrolled. Has received 1 units of PRBCs on 5/29/24 . Etiology likely d/t acute blood loss which was from epistaxis.  Current CBC reviewed-   Lab Results   Component Value Date    HGB 7.6 (L) 06/02/2024    HCT 24.9 (L) 06/02/2024     -Now s/p 2 U pRBCs. Hgb 7.5 this morning. No further episodes of epistaxis or bleeding from facial abrasion.   -Black/dark green appearing stool on rectal exam, which is normal for him given iron supplementation.   -If H&H continue to downtrend, would restart IV PPI 40 mg BID and contact GI for possible EGD. His respiratory status and INR may prevent intervention.   -Could also consider obtaining CTAP w/o to rule out hematoma.  -Caution with extra volume given respiratory status.

## 2024-06-03 VITALS
DIASTOLIC BLOOD PRESSURE: 64 MMHG | SYSTOLIC BLOOD PRESSURE: 146 MMHG | OXYGEN SATURATION: 96 % | HEIGHT: 65 IN | WEIGHT: 154.75 LBS | BODY MASS INDEX: 25.78 KG/M2 | TEMPERATURE: 98 F | HEART RATE: 49 BPM | RESPIRATION RATE: 18 BRPM

## 2024-06-03 LAB
ANION GAP SERPL CALC-SCNC: 11 MMOL/L (ref 8–16)
BASOPHILS # BLD AUTO: 0.02 K/UL (ref 0–0.2)
BASOPHILS NFR BLD: 0.4 % (ref 0–1.9)
BUN SERPL-MCNC: 58 MG/DL (ref 8–23)
CALCIUM SERPL-MCNC: 10.2 MG/DL (ref 8.7–10.5)
CHLORIDE SERPL-SCNC: 107 MMOL/L (ref 95–110)
CO2 SERPL-SCNC: 24 MMOL/L (ref 23–29)
CREAT SERPL-MCNC: 3.1 MG/DL (ref 0.5–1.4)
DIFFERENTIAL METHOD BLD: ABNORMAL
EOSINOPHIL # BLD AUTO: 0.2 K/UL (ref 0–0.5)
EOSINOPHIL NFR BLD: 3.4 % (ref 0–8)
ERYTHROCYTE [DISTWIDTH] IN BLOOD BY AUTOMATED COUNT: 20.1 % (ref 11.5–14.5)
EST. GFR  (NO RACE VARIABLE): 19.3 ML/MIN/1.73 M^2
GLUCOSE SERPL-MCNC: 89 MG/DL (ref 70–110)
HCT VFR BLD AUTO: 24.6 % (ref 40–54)
HGB BLD-MCNC: 7.8 G/DL (ref 14–18)
IMM GRANULOCYTES # BLD AUTO: 0.02 K/UL (ref 0–0.04)
IMM GRANULOCYTES NFR BLD AUTO: 0.4 % (ref 0–0.5)
INR PPP: 1.9 (ref 0.8–1.2)
LYMPHOCYTES # BLD AUTO: 0.9 K/UL (ref 1–4.8)
LYMPHOCYTES NFR BLD: 17.6 % (ref 18–48)
MAGNESIUM SERPL-MCNC: 2 MG/DL (ref 1.6–2.6)
MCH RBC QN AUTO: 28.9 PG (ref 27–31)
MCHC RBC AUTO-ENTMCNC: 31.7 G/DL (ref 32–36)
MCV RBC AUTO: 91 FL (ref 82–98)
MONOCYTES # BLD AUTO: 0.7 K/UL (ref 0.3–1)
MONOCYTES NFR BLD: 12.8 % (ref 4–15)
NEUTROPHILS # BLD AUTO: 3.4 K/UL (ref 1.8–7.7)
NEUTROPHILS NFR BLD: 65.4 % (ref 38–73)
NRBC BLD-RTO: 0 /100 WBC
PLATELET # BLD AUTO: 190 K/UL (ref 150–450)
PMV BLD AUTO: 11.1 FL (ref 9.2–12.9)
POTASSIUM SERPL-SCNC: 3.8 MMOL/L (ref 3.5–5.1)
PROTHROMBIN TIME: 20.2 SEC (ref 9–12.5)
RBC # BLD AUTO: 2.7 M/UL (ref 4.6–6.2)
SODIUM SERPL-SCNC: 142 MMOL/L (ref 136–145)
WBC # BLD AUTO: 5.23 K/UL (ref 3.9–12.7)

## 2024-06-03 PROCEDURE — 83735 ASSAY OF MAGNESIUM: CPT | Mod: HCNC | Performed by: STUDENT IN AN ORGANIZED HEALTH CARE EDUCATION/TRAINING PROGRAM

## 2024-06-03 PROCEDURE — 25000003 PHARM REV CODE 250: Mod: HCNC | Performed by: STUDENT IN AN ORGANIZED HEALTH CARE EDUCATION/TRAINING PROGRAM

## 2024-06-03 PROCEDURE — 36415 COLL VENOUS BLD VENIPUNCTURE: CPT | Mod: HCNC | Performed by: STUDENT IN AN ORGANIZED HEALTH CARE EDUCATION/TRAINING PROGRAM

## 2024-06-03 PROCEDURE — 85610 PROTHROMBIN TIME: CPT | Mod: HCNC | Performed by: HOSPITALIST

## 2024-06-03 PROCEDURE — 63600175 PHARM REV CODE 636 W HCPCS: Mod: HCNC | Performed by: STUDENT IN AN ORGANIZED HEALTH CARE EDUCATION/TRAINING PROGRAM

## 2024-06-03 PROCEDURE — 97530 THERAPEUTIC ACTIVITIES: CPT | Mod: HCNC

## 2024-06-03 PROCEDURE — 97116 GAIT TRAINING THERAPY: CPT | Mod: HCNC

## 2024-06-03 PROCEDURE — 80048 BASIC METABOLIC PNL TOTAL CA: CPT | Mod: HCNC | Performed by: STUDENT IN AN ORGANIZED HEALTH CARE EDUCATION/TRAINING PROGRAM

## 2024-06-03 PROCEDURE — 25000003 PHARM REV CODE 250: Mod: HCNC | Performed by: HOSPITALIST

## 2024-06-03 PROCEDURE — 85025 COMPLETE CBC W/AUTO DIFF WBC: CPT | Mod: HCNC | Performed by: STUDENT IN AN ORGANIZED HEALTH CARE EDUCATION/TRAINING PROGRAM

## 2024-06-03 RX ORDER — BUMETANIDE 1 MG/1
2 TABLET ORAL EVERY OTHER DAY
Start: 2024-06-03 | End: 2025-06-03

## 2024-06-03 RX ADMIN — AMLODIPINE BESYLATE 5 MG: 5 TABLET ORAL at 09:06

## 2024-06-03 RX ADMIN — ATORVASTATIN CALCIUM 40 MG: 40 TABLET, FILM COATED ORAL at 09:06

## 2024-06-03 RX ADMIN — Medication 324 MG: at 09:06

## 2024-06-03 RX ADMIN — ALLOPURINOL 100 MG: 300 TABLET ORAL at 09:06

## 2024-06-03 RX ADMIN — FUROSEMIDE 80 MG: 10 INJECTION, SOLUTION INTRAVENOUS at 09:06

## 2024-06-03 RX ADMIN — METOPROLOL SUCCINATE 25 MG: 25 TABLET, EXTENDED RELEASE ORAL at 09:06

## 2024-06-03 NOTE — ASSESSMENT & PLAN NOTE
Baseline 2.3 to 2.5; 3.8 on admission. Suspect some component of cardiorenal and/or ATN.    -Monitor UOP and kidney function.   -Cr improving with IV lasix, now 3.1

## 2024-06-03 NOTE — PLAN OF CARE
Pt discharged home with no post-acute needs. F/U appts scheduled by [facility].    Gina Shafer [facility]  Case Management Department  [email or web address]    [facility] - Cardiology Stepdown  Discharge Final Note    Primary Care Provider: Devon Langston Jr., MD    Expected Discharge Date: 6/3/2024    Final Discharge Note (most recent)       Final Note - 06/03/24 1744          Final Note    Assessment Type Final Discharge Note     Anticipated Discharge Disposition Home or Self Care     Hospital Resources/Appts/Education Provided Provided patient/caregiver with written discharge plan information;Appointments scheduled and added to AVS        Post-Acute Status    Post-Acute Authorization Other     Other Status No Post-Acute Service Needs     Discharge Delays None known at this time                     Important Message from Medicare  Important Message from Medicare regarding Discharge Appeal Rights: Given to patient/caregiver, Explained to patient/caregiver, Signed/date by patient/caregiver (signed by spouse)     Date IMM was signed: 06/03/24  Time IMM was signed: [id number]      Future Appointments   Date Time Provider Department Center   6/6/2024  8:10 AM LAB, APPOINTMENT [facility] INTMED [facility] IM [facility]   6/6/2024  8:40 AM Devon Langston Jr., MD [facility] IM [facility]   6/18/2024 10:00 AM Mabel Birmingham MD [facility] NEPHRO [facility]   6/21/2024  1:20 PM Chandrika Larkin OD [facility] OPTOMTY [address]   6/25/2024  9:00 AM Jono Russell MD [facility] HNSO [address]   7/16/2024 10:00 AM Cody Aranda MD [facility] CARDIO [address]

## 2024-06-03 NOTE — PT/OT/SLP PROGRESS
Physical Therapy Treatment    Patient Name:  Dariel Urbina   MRN:  [id number]  Admitting Diagnosis:  Acute blood loss anemia   Recent Surgery: * No surgery found *    Admit Date: 5/29/2024  Length of Stay: 5 days    Recommendations:     Discharge Recommendations:  Low Intensity Therapy     Discharge Equipment Recommendations: none   Barriers to discharge: None      Plan:     During this hospitalization, patient to be seen 4 x/week to address the identified rehab impairments via gait training, therapeutic activities, therapeutic exercises, neuromuscular re-education and progress towards the established goals.  Plan of Care Expires:  07/01/24  Plan of Care Reviewed with: patient    Assessment:     Dariel Urbina is a 83 y.o. male admitted with a medical diagnosis of Acute blood loss anemia. Pt found supine agreeable to therapy session. Pt demo'd improvements as he increased distance gait trained this session but continues to demonstrate mild instability throughout trial requiring CGA for safety. Pt and wife educated on pt's current functional status and assist required for pt safety. Patient would benefit from skilled therapy services to maximize safety and independence, increase activity tolerance, decrease fall risk, decrease caregiver burden, improve QOL, improve patient's functional mobility, and decrease risk of contractures and pressure sores.  Patient continues to demonstrate the need for low intensity therapy on a scheduled basis exhibited by decreased independence with functional mobility     Problem List: weakness, impaired endurance, impaired self care skills, impaired functional mobility, impaired balance, gait instability, decreased lower extremity function, decreased safety awareness, decreased upper extremity function, impaired cardiopulmonary response to activity.  Rehab Prognosis: Good; patient would benefit from acute skilled PT services to address these deficits and reach maximum level of  "function.      Goals:   Multidisciplinary Problems       Physical Therapy Goals          Problem: Physical Therapy    Goal Priority Disciplines Outcome Goal Variances Interventions   Physical Therapy Goal     PT, PT/OT Progressing     Description: PT goals to be met by: 7/1/24    Patient will perform supine <> sitting with supervision.  Patient will perform sit <> stand transitions with supervision.  Patient will perform transfers from bed <> chair or BSC with supervision.  Patient will ambulate 200 feet with supervision and no loss of balance.                       Subjective     RN notified prior to session. Wife present upon PT entrance into room. Patient agreeable to PT treatment session.    Chief Complaint: "I dont want to walk I'm cold but I will"  Patient/Family Comments/goals: go home  Pain/Comfort:  Pain Rating 1: 0/10  Pain Rating Post-Intervention 1: 0/10      Objective:     Patient found supine with: telemetry, pulse ox (continuous)   Cognition:   Alert and Cooperative  Patient is oriented to Person, Place, Time, Situation  General Precautions: Standard, Cardiac fall   Orthopedic Precautions:N/A   Braces: N/A   Body mass index is 25.75 kg/m².  Oxygen Device: Room Air  Vitals: BP (!) 146/64 (Patient Position: Sitting)   Pulse (!) 49   Temp 97.5 °F (36.4 °C) (Oral)   Resp 18   Ht 5' 5" (1.651 m)   Wt 70.2 kg (154 lb 12.2 oz)   SpO2 96%   BMI 25.75 kg/m²     Outcome Measures:  AM-PAC 6 CLICK MOBILITY  Turning over in bed (including adjusting bedclothes, sheets and blankets)?: 4  Sitting down on and standing up from a chair with arms (e.g., wheelchair, bedside commode, etc.): 3  Moving from lying on back to sitting on the side of the bed?: 4  Moving to and from a bed to a chair (including a wheelchair)?: 3  Need to walk in hospital room?: 3  Climbing 3-5 steps with a railing?: 3  Basic Mobility Total Score: 20     Functional Mobility:    Bed Mobility:   Scooting with independence  Supine > Sit with " independence  Sit > Supine with independence    Transfers:   Sit <> Stand Transfer: stand by assistance with no assistive device   Bed <> Chair Transfer: pt declined sitting in chair    Balance:  Sitting Balance  Static: independence  Dynamic: supervision  Standing:  Static: stand by assistance  Dynamic: contact guard assistance      Gait:  Patient ambulated: 220'   Patient required: contact guard  Patient used:  no assistive device   Gait Deviation(s): unsteady gait, decreased step length, narrow base of support, flexed posture, and decreased alcides  all lines remained intact throughout ambulation trial  Comments: Patient with increased lateral sway throughout trial with x2 small overt LOB requiring CGA for correction. Pt required verbal cues to increase SACHA to increase stability but did not respond to corrections.     Education:  All questions answered within PT scope of practice and to patient's satisfaction  PT role in POC to address current functional deficits  Educated on PT recommendation of CGA for safety upon arrival home to assist with pt balance; educated on RW use with increased unsteadiness    Patient left supine with all lines intact, call button in reach, and wife present.    Time Tracking:     PT Received On: 06/03/24  PT Start Time: 1139     PT Stop Time: 1147  PT Total Time (min): 8 min     Billable Minutes:   Gait Training 8 minutes    Treatment Type: Treatment  PT/PTA: PT       6/3/2024

## 2024-06-03 NOTE — ASSESSMENT & PLAN NOTE
Chronic, controlled. Latest blood pressure and vitals reviewed-     Temp:  [97.5 °F (36.4 °C)-98.3 °F (36.8 °C)]   Pulse:  [48-68]   Resp:  [16-19]   BP: (113-148)/(54-64)   SpO2:  [93 %-99 %] .   Home meds for hypertension were reviewed and noted below.   Hypertension Medications               amLODIPine (NORVASC) 5 MG tablet Take 1 tablet (5 mg total) by mouth once daily.    bumetanide (BUMEX) 1 MG tablet Take 1 tablet (1 mg total) by mouth every other day.    isosorbide mononitrate (IMDUR) 60 MG 24 hr tablet TAKE 1 TABLET EVERY EVENING    metoprolol succinate (TOPROL-XL) 25 MG 24 hr tablet Take 1 tablet (25 mg total) by mouth once daily.          -While in the hospital, will manage blood pressure as follows; Continue home antihypertensive regimen  -Holding PO Bumex as he is on IV lasix for volume overload.  -Will utilize p.r.n. blood pressure medication only if patient's blood pressure greater than 180/110 and he develops symptoms such as worsening chest pain or shortness of breath.

## 2024-06-03 NOTE — PT/OT/SLP PROGRESS
Occupational Therapy   Treatment    Name: Dariel Urbina  MRN: [id number]  Admitting Diagnosis:  Acute blood loss anemia       Recommendations:     Discharge Recommendations: Low Intensity Therapy  Discharge Equipment Recommendations:  none  Barriers to discharge:  None    Assessment:     Dariel Urbina is a 83 y.o. male with a medical diagnosis of Acute blood loss anemia.  He presents with the following performance deficits affecting function are weakness, impaired endurance, impaired self care skills, impaired functional mobility, gait instability, impaired balance, decreased safety awareness, impaired cardiopulmonary response to activity.     Rehab Prognosis:  Good; patient would benefit from acute skilled OT services to address these deficits and reach maximum level of function.       Plan:     Patient to be seen 3 x/week to address the above listed problems via self-care/home management, therapeutic activities, therapeutic exercises  Plan of Care Expires: 07/01/24  Plan of Care Reviewed with: patient    Subjective     Chief Complaint: none stated  Patient/Family Comments/goals: to d/c home  Pain/Comfort:  Pain Rating 1: 0/10  Pain Rating Post-Intervention 1: 0/10    Objective:     Communicated with: RN prior to session.  Patient found HOB elevated with telemetry upon OT entry to room.    General Precautions: Standard, fall    Orthopedic Precautions:N/A  Braces: N/A  Respiratory Status: Room air     Occupational Performance:     Bed Mobility:    Patient completed Supine to Sit with supervision     Functional Mobility/Transfers:  Patient completed Sit <> Stand Transfer with stand by assistance  with  no assistive device   Patient completed Toilet Transfer Step Transfer technique with stand by assistance with  no AD  Step transfer > Bedside Chair with SBA and no AD  Functional Mobility: Pt engaged in functional mobility to simulate household/community distances 12 ft x2 trials with SBA and no AD in order to  maximize functional endurance and standing balance required for engagement in occupations of choice   No LOB or SOB noted    Activities of Daily Living:  Lower Body Dressing: stand by assistance to pull shorts above hips in standing from EOB      [facility] 6 Click ADL: 21    Treatment & Education:  -Education on energy conservation and task modification to maximize safety and (I) during ADLs and mobility  -Education on importance of OOB activity to improve overall activity tolerance and promote recovery  -Pt educated to call for assistance and to transfer with hospital staff only  -Provided education regarding role of OT, POC, & discharge recommendations with pt verbalizing understanding.  Pt had no further questions & when asked whether there were any concerns pt reported none.     Patient left up in chair with all lines intact, call button in reach, and RN notified    GOALS:   Multidisciplinary Problems       Occupational Therapy Goals          Problem: Occupational Therapy    Goal Priority Disciplines Outcome Interventions   Occupational Therapy Goal     OT, PT/OT Progressing    Description: Goals to be met by: 7/1/24     Patient will increase functional independence with ADLs by performing:    UE Dressing with Lindsay.  LE Dressing with Lindsay.  Grooming while standing with Lindsay.  Toileting from toilet with Lindsay for hygiene and clothing management.   Step transfer with Lindsay  Toilet transfer to toilet with Lindsay.                         Time Tracking:     OT Date of Treatment: 06/03/24  OT Start Time: 0850  OT Stop Time: 0902  OT Total Time (min): 12 min    Billable Minutes:Therapeutic Activity 12    OT/SHARIF: OT          6/3/2024

## 2024-06-03 NOTE — PLAN OF CARE
10:51- CHW attempted to complete IMM, patient requesting CHW return once his spouse is present in the room.    11:26- CHW met with patient/family at bedside. Patient experience rounding completed and reviewed the following.     Do you know your discharge plan? Yes or No,    If yes, what is the plan? (Home, Home Health, Rehab, SNF, LTAC, or Other) Yes Home    Have you discussed your needs and preferences with your SW/CM? Yes or No  Yes Home    If you are discharging home, do you have help at home? Yes or No Yes (spouse)    Do you think you will need help additional at home at discharge? Yes or No  No     Do you currently have difficulty keeping up with bills, affording medicine or buying food? Yes or No No    Assigned SW/CM notified of any patient/family needs or concerns. Appropriate resources provided to address patient's needs.      Oliva Fragoso CHW  Case Management  [phone]

## 2024-06-03 NOTE — ASSESSMENT & PLAN NOTE
Patient's anemia is currently uncontrolled. Has received 1 units of PRBCs on 5/29/24 . Etiology likely d/t acute blood loss which was from epistaxis.  Current CBC reviewed-   Lab Results   Component Value Date    HGB 7.8 (L) 06/03/2024    HCT 24.6 (L) 06/03/2024     -Now s/p 2 U pRBCs. Hgb 7.8 this morning. No further episodes of epistaxis or bleeding from facial abrasion.   -Black/dark green appearing stool on rectal exam, which is normal for him given iron supplementation.   -If H&H continue to downtrend, would restart IV PPI 40 mg BID and contact GI for possible EGD. His respiratory status and INR may prevent intervention.   -Could also consider obtaining CTAP w/o to rule out hematoma.  -Caution with extra volume given respiratory status.

## 2024-06-03 NOTE — PLAN OF CARE
Removed patient's peripheral IV and telemetry and wheeled him downstairs where his wife picked him up and drove him home. Vital signs stable at time of discharge. Patient reports having no nosebleeds during this hospital stay. Reviewed d/c paperwork with patient and his wife.

## 2024-06-03 NOTE — ASSESSMENT & PLAN NOTE
Patient with Paroxysmal (<7 days) atrial fibrillation which is controlled currently with Beta Blocker. Patient is currently in atrial fibrillation.[id number] Score: 4. HASBLED Score: . Anticoagulation indicated. Anticoagulation done with coumadin in setting of mechanical aortic valve .

## 2024-06-03 NOTE — DISCHARGE SUMMARY
[facility] - Cardiology [facility] Medicine  Discharge Summary      Patient Name: Dariel Urbina  MRN: [id number]  EMELIA: [id number]  Patient Class: IP- Inpatient  Admission Date: 5/29/2024  Hospital Length of Stay: 5 days  Discharge Date and Time: 6/3/2024 12:15 PM  Attending Physician: No att. 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 Patient states he went to coumadin clinic earlier today for lab works and later received a call from clinic about low Hgb and elevated INR. He reports increased fatigue, generalized weakness and dyspnea on exertion over last couple of weeks. He also reports poor appetite and 15 lbs weight loss over last 2 months. He reports daily spontaneous nosebleed for many years for which he follows with ENT and had ligation and cauterization (most recent last week). ENT told patient that this is going to be life long problem given patient needs to be on coumadin for mechanical aortic valve. He reports swallowing blood drom nose bleed. Patient states nose bleed stopped this afternoon before coming to hospital w/o recurrence so far. He reports bilateral leg swelling despite taking bumex at home and he thinks diuretic is not working as well as it did in the past. He denies bleeding from any other sites. His last admission for epistaxis was in December requiring  cauterization.    ED course: afebrile and vitals stable. Hgb 5.6, INR 8.2, Cr 3.6. patient received 5 mg vitamin K and 1 unit blood transfusion in ED. During my interview, patient is awake, conversant and not in distress. He is oxygenating well on room air.      KIRSTEN (4/14/23):  The left ventricle is normal in size with normal systolic function.The estimated ejection fraction is 60%.  Normal right ventricular size with normal right ventricular systolic function.  Moderate mitral regurgitation.  There is a mechanical aortic valve present. There is no aortic insufficiency present.  Plaque present in the transverse aorta.                * No surgery found *      Hospital Course:   Patient admitted to Hospital Medicine Team C on 5/29 with recurrent epistaxis, symptomatic anemia and supratherapeutic INR. He was given 1 U pRBCs for Hgb 5.6 with improvement to 7.0 and a dose of Vitamin K. Coumadin held on admission, INR downtrending, now 4.2. ENT consulted for epistaxis; recommended use of afrin and holding manual pressure should he continue to have bleeding. Elevated BNP >1000 on admission, started on IV lasix 40 mg BID. Hospital course complicated by JIM on CKD, with Cr >3. On 5/31, Hgb 6.6, 1 U pRBCs given. On the morning of 5/31, prior to receiving pRBCs, he acutely developed SOB and CP. EKG similar to prior. Given concern for pulmonary edema, he was given IV 80 mg lasix with improvement in SOB and CP. Troponin elevated with peak of 0.566, likely demand in the setting of pulmonary edema and resolution of symptoms with IV lasix. Adequate diuresis with IV lasix, weaned to room air.  He was discharged on Bumex 2mg from 1mg prior to admit.  INR now subtherapeutic, restarted Coumadin on 6/01 at 2.5. Home dose resumed on 6/03. PT/OT recommending home with HH.       Mr. Dariel Urbina was deemed appropriate for discharge.  At the time of discharge, the plan of care was discussed with patient/family, who were agreeable and  amenable.  All medications were verbally reviewed and discussed with the patient/family.  They endorsed understanding and compliance.  Informed them that these changes will be available on their discharge paperwork, as well.  Outpatient follow-ups were scheduled, or if unable to be scheduled ambulatory referrals were placed, and ER return precautions were given.  All questions were answered to the patient/family's satisfaction.  Mr. Dariel Urbina was subsequently discharged in stable condition.       Goals of Care Treatment Preferences:  Code Status: Full Code          What is most important right now is to focus on remaining as independent as possible, symptom/pain control, extending life as long as possible, even it it means sacrificing quality.  Accordingly, we have decided that the best plan to meet the patient's goals includes continuing with treatment.      Consults:   Consults (From admission, onward)          Status Ordering Provider     [facility] PharmD Consult  Once        Provider:  (Not yet assigned)    Acknowledged JAZZ BETANCOURT     Inpatient consult to ENT  Once        Provider:  (Not yet assigned)    Completed SERGE SCANLON.     Inpatient consult to Social Work/Case Management  Once        Provider:  (Not yet assigned)    Completed SERGE SCANLON.     Inpatient consult to Registered Dietitian/Nutritionist  Once        Provider:  (Not yet assigned)    Completed SERGE SCANLON.            No new Assessment & Plan notes have been filed under this hospital service since the last note was generated.  Service: Hospital Medicine    Final Active Diagnoses:    Diagnosis Date Noted POA    PRINCIPAL PROBLEM:  Acute blood loss anemia [D62] 12/21/2022 Yes    Acute hypoxic respiratory failure [J96.01] 05/30/2024 Yes    Benign essential HTN [I10] 05/30/2024 Yes    ACP (advance care planning) [Z71.89] 05/30/2024 Not Applicable    Acute on chronic systolic heart failure [I50.23] 05/30/2024 Yes    Paroxysmal  atrial fibrillation [I48.0] 02/06/2023 Yes    Coronary artery disease involving native coronary artery of native heart without angina pectoris [I25.10] 12/29/2022 Yes    Acute kidney injury superimposed on chronic kidney disease [N17.9, N18.9] 02/10/2022 Yes    Epistaxis [R04.0] 03/21/2018 Yes    NSTEMI (non-ST elevated myocardial infarction) [I21.4] 03/21/2018 Yes    H/O mechanical aortic valve replacement [Z95.2] 09/17/2014 Not Applicable    Chronic anticoagulation [Z79.01] 09/26/2012 Not Applicable      Problems Resolved During this Admission:    Diagnosis Date Noted Date Resolved POA    JIM (acute kidney injury) [N17.9] 12/21/2022 05/31/2024 Yes       Discharged Condition: fair    Disposition: Home or Self Care    Follow Up:    Patient Instructions:      Ambulatory referral/consult to Outpatient Case Management   Referral Priority: Routine Referral Type: Consultation   Referral Reason: Specialty Services Required   Number of Visits Requested: 1     ACCEPT - Ambulatory referral/consult to Heart Failure Transitional [facility]   Standing Status: Future   Referral Priority: Routine Referral Type: Consultation   Referral Reason: Specialty Services Required   Requested Specialty: Cardiology   Number of Visits Requested: 1     Notify your health care provider if you experience any of the following:  difficulty breathing or increased cough     Notify your health care provider if you experience any of the following:  persistent dizziness, light-headedness, or visual disturbances     Activity as tolerated       Significant Diagnostic Studies: Labs: CMP   Recent Labs   Lab 06/01/24  1721 06/02/24  0829 06/02/24  1747 06/03/24  0745    141 141 142   K 4.0 3.5 4.2 3.8    108 108 107   CO2 20* 21* 22* 24   * 84 111* 89   BUN 63* 61* 59* 58*   CREATININE 3.5* 3.3* 3.3* 3.1*   CALCIUM 9.7 10.2 9.8 10.2   ALBUMIN 3.0* 3.2* 3.1*  --    ANIONGAP 11 12 11 11   , CBC   Recent Labs   Lab 06/02/24  0420  06/03/24  0532   WBC 5.48 5.23   HGB 7.6* 7.8*   HCT 24.9* 24.6*    190   , and INR   Lab Results   Component Value Date    INR 1.9 (H) 06/03/2024    INR 2.0 (H) 06/02/2024    INR 2.7 (H) 06/01/2024       Pending Diagnostic Studies:       None           Medications:  Reconciled Home Medications:      Medication List        CHANGE how you take these medications      bumetanide 1 MG tablet  Commonly known as: BUMEX  Take 2 tablets (2 mg total) by mouth every other day.  What changed: how much to take            CONTINUE taking these medications      acetaminophen 500 MG tablet  Commonly known as: TYLENOL  Take 500 mg by mouth daily as needed for Pain.     allopurinoL 100 MG tablet  Commonly known as: ZYLOPRIM  Take 1 tablet (100 mg total) by mouth once daily.     amLODIPine 5 MG tablet  Commonly known as: NORVASC  Take 1 tablet (5 mg total) by mouth once daily.     DEEP SEA NASAL 0.65 % nasal spray  Generic drug: sodium chloride  2 sprays by Nasal route 3 (three) times daily.     ferrous gluconate 324 MG tablet  Commonly known as: FERGON  TAKE 1 TABLET EVERY DAY WITH BREAKFAST     fish oil-omega-3 fatty acids 300-1,000 mg capsule  Take 1 capsule by mouth once daily.     isosorbide mononitrate 60 MG 24 hr tablet  Commonly known as: IMDUR  TAKE 1 TABLET EVERY EVENING     metoprolol succinate 25 MG 24 hr tablet  Commonly known as: TOPROL-XL  Take 1 tablet (25 mg total) by mouth once daily.     rosuvastatin 40 MG Tab  Commonly known as: CRESTOR  TAKE 1 TABLET EVERY EVENING.     traZODone 50 MG tablet  Commonly known as: DESYREL  Take 1 tablet (50 mg total) by mouth every evening.     warfarin 5 MG tablet  Commonly known as: COUMADIN  TAKE 1/2 TABLET (2.5MG) SATURDAY, THEN TAKE 1 TABLET (5MG) ALL OTHER DAYS OR AS INSTRUCTED BY COUMADIN CLINIC              Indwelling Lines/Drains at time of discharge:   Lines/Drains/Airways       None                   Time spent on the discharge of patient: 35 minutes          Aurelia Perry MD  Department of [facility] Medicine  [facility] - Cardiology Stepdown

## 2024-06-03 NOTE — ASSESSMENT & PLAN NOTE
-done in 1990s   -on chronic coumadin c/b long standing epistaxis   -takes coumadin 5 mg nightly and last taken 5/28 with goal INR 2-3 per patient   -INR elevated to 8 and received vitamin K 5 mg in ED  -Coumadin held given supratherapeutic INR.   -INR 1.9 today, PharmD adjusting dose for outpatient

## 2024-06-03 NOTE — PROGRESS NOTES
[facility] - Cardiology [facility] Medicine  Progress Note    Patient Name: Dariel Urbina  MRN: [id number]  Patient Class: IP- Inpatient   Admission Date: 5/29/2024  Length of Stay: 5 days  Attending Physician: Aurelia Perry MD  Primary Care Provider: Devon Langston Jr., MD        Subjective:     Principal Problem:Acute blood loss anemia        HPI:  Dariel Urbina is an 83 year old male with a past medical history of CAD s/p CABG 1990's, mechanical AVR (on warfarin c/b long standing epistaxis) c/b mod-severe MR w/ mitraclip, afib, GIB 2/2 diverticulosis s/p partial colon resection, T2DM (diet controlled), gout, HLD, HTN, CKD4 (Cr 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 Patient states he went to coumadin clinic earlier today for lab works and later received a call from clinic about low Hgb and elevated INR. He reports increased fatigue, generalized weakness and dyspnea on exertion over last couple of weeks. He also reports poor appetite and 15 lbs weight loss over last 2 months. He reports daily spontaneous nosebleed for many years for which he follows with ENT and had ligation and cauterization (most recent last week). ENT told patient that this is going to be life long problem given patient needs to be on coumadin for mechanical aortic valve. He reports swallowing blood drom nose bleed. Patient states nose bleed stopped this afternoon before coming to hospital w/o recurrence so far. He reports bilateral leg swelling despite taking bumex at home and he thinks diuretic is not working as well as it did in the past. He denies bleeding from any other sites. His last admission for epistaxis was in December requiring cauterization.    ED course: afebrile and vitals stable. Hgb 5.6, INR 8.2, Cr 3.6. patient received 5 mg vitamin K and 1 unit blood transfusion in ED.  During my interview, patient is awake, conversant and not in distress. He is oxygenating well on room air.      KIRSTEN (4/14/23):  The left ventricle is normal in size with normal systolic function.The estimated ejection fraction is 60%.  Normal right ventricular size with normal right ventricular systolic function.  Moderate mitral regurgitation.  There is a mechanical aortic valve present. There is no aortic insufficiency present.  Plaque present in the transverse aorta.                Overview/Hospital Course:  Patient admitted to Hospital Medicine Team C on 5/29 with recurrent epistaxis, symptomatic anemia and supratherapeutic INR. He was given 1 U pRBCs for Hgb 5.6 with improvement to 7.0 and a dose of Vitamin K. Coumadin held on admission, INR downtrending, now 4.2. ENT consulted for epistaxis; recommended use of afrin and holding manual pressure should he continue to have bleeding. Elevated BNP >1000 on admission, started on IV lasix 40 mg BID. Hospital course complicated by JIM on CKD, with Cr >3. On 5/31, Hgb 6.6, 1 U pRBCs given. On the morning of 5/31, prior to receiving pRBCs, he acutely developed SOB and CP. EKG similar to prior. Given concern for pulmonary edema, he was given IV 80 mg lasix with improvement in SOB and CP. Troponin elevated with peak of 0.566, likely demand in the setting of pulmonary edema and resolution of symptoms with IV lasix. Adequate diuresis with IV lasix, weaned to room air.  He was discharged on Bumex 2mg from 1mg prior to admit.  INR now subtherapeutic, restarted Coumadin on 6/01 at 2.5. Home dose resumed on 6/03. PT/OT recommending home with .     Interval History: No acute events overnight.  Feels good today.  INR 1.9 with goal 2-3.  No epistaxis.  No leg swelling.  Plan to DC on higher dose Bumex given decreased urination on 1mg prior to admission.    Review of Systems   Constitutional:  Negative for chills, fatigue and fever.   Respiratory:  Negative for cough and  shortness of breath.    Cardiovascular:  Negative for chest pain, palpitations and leg swelling.   Gastrointestinal:  Negative for abdominal pain, diarrhea, nausea and vomiting.   Genitourinary:  Negative for dysuria and urgency.   Neurological:  Negative for dizziness and headaches.   All other systems reviewed and are negative.    Objective:     Vital Signs (Most Recent):  Temp: 97.5 °F (36.4 °C) (06/03/24 1112)  Pulse: (!) 49 (06/03/24 1112)  Resp: 18 (06/03/24 1112)  BP: (!) 146/64 (06/03/24 1112)  SpO2: 96 % (06/03/24 1112) Vital Signs (24h Range):  Temp:  [97.5 °F (36.4 °C)-98.3 °F (36.8 °C)] 97.5 °F (36.4 °C)  Pulse:  [48-68] 49  Resp:  [16-19] 18  SpO2:  [93 %-99 %] 96 %  BP: (113-148)/(54-64) 146/64     Weight: 70.2 kg (154 lb 12.2 oz)  Body mass index is 25.75 kg/m².    Intake/Output Summary (Last 24 hours) at 6/3/2024 1219  Last data filed at 6/3/2024 0901  Gross per 24 hour   Intake 1320 ml   Output 1675 ml   Net -355 ml         Physical Exam  Constitutional:       Appearance: Normal appearance.   HENT:      Head: Normocephalic and atraumatic.   Cardiovascular:      Rate and Rhythm: Regular rhythm. Bradycardia present.      Heart sounds: No murmur heard.  Pulmonary:      Effort: Pulmonary effort is normal. No respiratory distress.      Breath sounds: Normal breath sounds. No wheezing or rales.   Abdominal:      General: There is no distension.      Palpations: Abdomen is soft.      Tenderness: There is no abdominal tenderness.   Musculoskeletal:         General: No deformity.      Right lower leg: No edema.      Left lower leg: No edema.   Neurological:      General: No focal deficit present.      Mental Status: He is alert and oriented to person, place, and time. Mental status is at baseline.             Significant Labs: All pertinent labs within the past 24 hours have been reviewed.  CBC:   Recent Labs   Lab 06/02/24  0420 06/03/24  0532   WBC 5.48 5.23   HGB 7.6* 7.8*   HCT 24.9* 24.6*    190      CMP:   Recent Labs   Lab 06/01/24  1721 06/02/24  0829 06/02/24  1747 06/03/24  0745    141 141 142   K 4.0 3.5 4.2 3.8    108 108 107   CO2 20* 21* 22* 24   * 84 111* 89   BUN 63* 61* 59* 58*   CREATININE 3.5* 3.3* 3.3* 3.1*   CALCIUM 9.7 10.2 9.8 10.2   ALBUMIN 3.0* 3.2* 3.1*  --    ANIONGAP 11 12 11 11     Coagulation:   Recent Labs   Lab 06/03/24  0532   INR 1.9*       Significant Imaging: I have reviewed all pertinent imaging results/findings within the past 24 hours.    Assessment/Plan:      * Acute blood loss anemia  Patient's anemia is currently uncontrolled. Has received 1 units of PRBCs on 5/29/24 . Etiology likely d/t acute blood loss which was from epistaxis.  Current CBC reviewed-   Lab Results   Component Value Date    HGB 7.8 (L) 06/03/2024    HCT 24.6 (L) 06/03/2024     -Now s/p 2 U pRBCs. Hgb 7.8 this morning. No further episodes of epistaxis or bleeding from facial abrasion.   -Black/dark green appearing stool on rectal exam, which is normal for him given iron supplementation.   -If H&H continue to downtrend, would restart IV PPI 40 mg BID and contact GI for possible EGD. His respiratory status and INR may prevent intervention.   -Could also consider obtaining CTAP w/o to rule out hematoma.  -Caution with extra volume given respiratory status.    Acute on chronic systolic heart failure  Echo    Result Date: 5/30/2024    Left Ventricle: The left ventricle is normal in size. Normal wall   thickness. There is concentric hypertrophy. Septal motion is consistent   with post-operative status. There is low normal systolic function with a   visually estimated ejection fraction of 50 - 55%. Biplane (2D) method of   discs ejection fraction is 51%.    Right Ventricle: Mild right ventricular enlargement. Wall thickness is   normal. Systolic function is normal.    Left Atrium: Left atrium is severely dilated.    Right Atrium: Right atrium is severely dilated.    Aortic Valve: There is a  mechanical valve in the aortic position.   Aortic valve area by VTI is 0.87 cm². Aortic valve peak velocity is 3.02   m/s. Mean gradient is 19 mmHg. The dimensionless index is 0.31. There is   trace aortic regurgitation.    Mitral Valve: There is mild bileaflet sclerosis. There is moderate   mitral annular calcification present. There is moderate to severe   regurgitation with a centrally directed jet.    Tricuspid Valve: There is moderate regurgitation.    Pulmonary Artery: There is moderate pulmonary hypertension. The   estimated pulmonary artery systolic pressure is 57 mmHg.    IVC/SVC: Intermediate venous pressure at 8 mmHg.       -See acute hypoxemic respiratory failure.    ACP (advance care planning)  Advance Care Planning    Date: 05/31/2024    [facility]  I engaged the patient in a voluntary conversation about advance care planning and we specifically addressed what the goals of care would be moving forward, in light of the patient's change in clinical status, specifically if his heart or breathing stops.  We did specifically address the patient's likely prognosis, which is fair .  We explored the patient's values and preferences for future care.  The patient endorses that what is most important right now is to focus on remaining as independent as possible, symptom/pain control, and extending life as long as possible, even it it means sacrificing quality    Accordingly, we have decided that the best plan to meet the patient's goals includes continuing with treatment and Full code.     I spent a total of 10 minutes engaging the patient in this advance care planning discussion.               Benign essential HTN  Chronic, controlled. Latest blood pressure and vitals reviewed-     Temp:  [97.5 °F (36.4 °C)-98.3 °F (36.8 °C)]   Pulse:  [48-68]   Resp:  [16-19]   BP: (113-148)/(54-64)   SpO2:  [93 %-99 %] .   Home meds for hypertension were reviewed and noted below.   Hypertension Medications               amLODIPine  (NORVASC) 5 MG tablet Take 1 tablet (5 mg total) by mouth once daily.    bumetanide (BUMEX) 1 MG tablet Take 1 tablet (1 mg total) by mouth every other day.    isosorbide mononitrate (IMDUR) 60 MG 24 hr tablet TAKE 1 TABLET EVERY EVENING    metoprolol succinate (TOPROL-XL) 25 MG 24 hr tablet Take 1 tablet (25 mg total) by mouth once daily.          -While in the hospital, will manage blood pressure as follows; Continue home antihypertensive regimen  -Holding PO Bumex as he is on IV lasix for volume overload.  -Will utilize p.r.n. blood pressure medication only if patient's blood pressure greater than 180/110 and he develops symptoms such as worsening chest pain or shortness of breath.    Acute hypoxic respiratory failure  Patient with Hypoxic Respiratory failure which is Acute.  he is not on home oxygen. Supplemental oxygen was provided and noted-  Signs/symptoms of respiratory failure include- tachypnea, increased work of breathing, and wheezing.     -Developed acute dyspnea and hypoxia during 2nd unit blood transfusion. CXR showed increased pulmonary edema. Started on IV Lasix 40 mg BID but remained net positive on I&Os.  -Worsening dyspnea and new substernal chest pain on 5/31, given 80 IV lasix with improvement. IV lasix increased to 80 mg TID, with improvement in oxygen requirements and symptoms. Suspect his worsening hypoxemia likely from pulmonary edema. Now improvement in sxs and on room air, decreased lasix from 80 TID to BID as he still does have some rales on exam. Suspect he can be transitioned to PO lasix on 6/03 and discharge home.  -Caution with extra volume, as he becomes overloaded easily.  -Wean oxygen as tolerated.       Paroxysmal atrial fibrillation  Patient with Paroxysmal (<7 days) atrial fibrillation which is controlled currently with Beta Blocker. Patient is currently in atrial fibrillation.[id number] Score: 4. HASBLED Score: . Anticoagulation indicated. Anticoagulation done with coumadin in  setting of mechanical aortic valve .    Coronary artery disease involving native coronary artery of native heart without angina pectoris  Patient with known CAD s/p stent placement and CABG, which is controlled Will continue  beta blocker and Statin and monitor for S/Sx of angina/ACS. Continue to monitor on telemetry.     Acute kidney injury superimposed on chronic kidney disease  Baseline 2.3 to 2.5; 3.8 on admission. Suspect some component of cardiorenal and/or ATN.    -Monitor UOP and kidney function.   -Cr improving with IV lasix, now 3.1    Epistaxis  -long standing with daily epistaxis while on coumadin   -seen by ENT with multiple ligation and cauterization (last on 5/21 during clinic visit)  -no active bleeding now but high risk for recurrence   -ENT consulted; no intervention required. Recommendations noted below:  - Recommend against any instrumentation of the nose  - Recommend against nasal cannula - if patient requires supp O2, recommend humidified O2 via face tent or mask  - For further bleeding, recommend liberally spraying Afrin and holding manual pressure for 10 min. For further bleeding, page ENT on call. May require angiography and poss emobilization w IR as salvage treatment.      NSTEMI (non-ST elevated myocardial infarction)  New onset substernal CP and SOB on the morning of 5/31. Improved with increasing IV lasix. EKG similar to prior. Troponin trend 0.057->0.101->0.338->0.566->0.495. On call provider overnight discussed with Cardiology given uptrending troponin, felt to be demand ischemia.     -Troponin peaked.  -Given resolution of sxs with increasing diuretics, suspect his troponin leak was demand from pulmonary edema along with slow clearance given renal dysfunction.       H/O mechanical aortic valve replacement  -done in 1990s   -on chronic coumadin c/b long standing epistaxis   -takes coumadin 5 mg nightly and last taken 5/28 with goal INR 2-3 per patient   -INR elevated to 8 and received  vitamin K 5 mg in ED  -Coumadin held given supratherapeutic INR.   -INR 1.9 today, PharmD adjusting dose for outpatient        Chronic anticoagulation  -See AVR        VTE Risk Mitigation (From admission, onward)           Ordered     warfarin (COUMADIN) tablet 5 mg  Daily         06/02/24 0851     IP VTE HIGH RISK PATIENT  Once         05/30/24 0634     Place sequential compression device  Until discontinued         05/30/24 0634                    Discharge Planning   ASTER: 6/3/2024     Code Status: Full Code   Is the patient medically ready for discharge?:     Reason for patient still in hospital (select all that apply): Patient trending condition  Discharge Plan A: Home, Home with family, Home Health                  Aurelia Perry MD  Department of Hospital Medicine   [facility] - Cardiology Stepdown

## 2024-06-03 NOTE — PLAN OF CARE
[facility]  [address]  [facility] [address]  Phone: [phone]    Home Health Orders  Face to Face Encounter    Patient Name: Dariel Urbina  YOB: 1940    PCP: Devon Langston Jr., MD   [address] Address: [address] / [place] [address]  PCP Phone Number: [phone]  PCP Fax: [phone]    Encounter Date: 06/03/2024    Admit To Home Health    Diagnoses:  Active Hospital Problems    Diagnosis  POA    *Acute blood loss anemia [D62]  Yes     Priority: 1 - High    Acute hypoxic respiratory failure [J96.01]  Yes    Benign essential HTN [I10]  Yes    ACP (advance care planning) [Z71.89]  Not Applicable    Acute on chronic systolic heart failure [I50.23]  Yes     POA  After blood transfusion, requiring O2  Diuresed with IV Lasix      Paroxysmal atrial fibrillation [I48.0]  Yes    Coronary artery disease involving native coronary artery of native heart without angina pectoris [I25.10]  Yes    Acute kidney injury superimposed on chronic kidney disease [N17.9, N18.9]  Yes    Epistaxis [R04.0]  Yes    NSTEMI (non-ST elevated myocardial infarction) [I21.4]  Yes    H/O mechanical aortic valve replacement [Z95.2]  Not Applicable    Chronic anticoagulation [Z79.01]  Not Applicable      Resolved Hospital Problems    Diagnosis Date Resolved POA    JIM (acute kidney injury) [N17.9] 05/31/2024 Yes       Follow Up Appointments:  Future Appointments   Date Time Provider Department Center   6/6/2024  8:40 AM Devon Langston Jr., MD [facility] IM [facility] PCW   6/18/2024 10:00 AM Mabel Birmingham MD [facility]   6/21/2024  1:20 PM Chandrika Larkin OD [facility] OPTOMTY [facility]   6/25/2024  9:00 AM Jono Russell MD [facility] HNSO [facility]   7/16/2024 10:00 AM Cody Aranda MD [facility] CARDIO [facility]           Allergies:  Review of patient's allergies indicates:   Allergen Reactions    Lisinopril     Losartan      Intolerance- elevates potassium level          Medications: Review discharge medications with patient and family and provide education.      Current Discharge Medication List        CONTINUE these medications which have CHANGED    Details   bumetanide (BUMEX) 1 MG tablet Take 2 tablets (2 mg total) by mouth every other day.           CONTINUE these medications which have NOT CHANGED    Details   acetaminophen (TYLENOL) 500 MG tablet Take 500 mg by mouth daily as needed for Pain.      allopurinoL (ZYLOPRIM) 100 MG tablet Take 1 tablet (100 mg total) by mouth once daily.  Qty: 90 tablet, Refills: 3    Associated Diagnoses: Gout, unspecified cause, unspecified chronicity, unspecified site      amLODIPine (NORVASC) 5 MG tablet Take 1 tablet (5 mg total) by mouth once daily.  Qty: 90 tablet, Refills: 3    Comments: .  Associated Diagnoses: Hypertension associated with diabetes      ferrous gluconate (FERGON) 324 MG tablet TAKE 1 TABLET EVERY DAY WITH BREAKFAST  Qty: 90 tablet, Refills: 3    Associated Diagnoses: Recurrent epistaxis      isosorbide mononitrate (IMDUR) 60 MG 24 hr tablet TAKE 1 TABLET EVERY EVENING  Qty: 90 tablet, Refills: 3      metoprolol succinate (TOPROL-XL) 25 MG 24 hr tablet Take 1 tablet (25 mg total) by mouth once daily.  Qty: 90 tablet, Refills: 3    Comments: .      omega-3 fatty acids/fish oil (FISH OIL-OMEGA-3 FATTY ACIDS) 300-1,000 mg capsule Take 1 capsule by mouth once daily.      rosuvastatin (CRESTOR) 40 MG Tab TAKE 1 TABLET EVERY EVENING.  Qty: 90 tablet, Refills: 3      sodium chloride (SALINE NASAL) 0.65 % nasal spray 2 sprays by Nasal route 3 (three) times daily.  Qty: 44 mL, Refills: 3      traZODone (DESYREL) 50 MG tablet Take 1 tablet (50 mg total) by mouth every evening.  Qty: 90 tablet, Refills: 2      warfarin (COUMADIN) 5 MG tablet TAKE 1/2 TABLET (2.5MG) SATURDAY, THEN TAKE 1 TABLET (5MG) ALL OTHER DAYS OR AS INSTRUCTED BY COUMADIN CLINIC  Qty: 90 tablet, Refills: 3    Associated Diagnoses: Anticoagulant long-term  use; H/O mechanical aortic valve replacement               I have seen and examined this patient within the last 30 days. My clinical findings that support the need for the home health skilled services and home bound status are the following:no   Weakness/numbness causing balance and gait disturbance due to Anemia making it taxing to leave home.       Code Status:    Code Status: Full Code      Nursing:   Agency to admit patient within 24 hours of hospital discharge unless specified on physician order or at patient request    SN to complete comprehensive assessment including routine vital signs. Instruct on disease process and s/s of complications to report to MD. Review/verify medication list sent home with the patient at time of discharge  and instruct patient/caregiver as needed. Frequency may be adjusted depending on start of care date.     Skilled nurse to perform up to 3 visits PRN for symptoms related to diagnosis    Notify MD if SBP > 160 or < 90; DBP > 90 or < 50; HR > 120 or < 50; Temp > 101; O2 < 88%    Ok to schedule additional visits based on staff availability and patient request on consecutive days within the home health episode.      When multiple disciplines ordered:    Start of Care occurs on Sunday - Wednesday schedule remaining discipline evaluations as ordered on separate consecutive days following the start of care.    Thursday SOC -schedule subsequent evaluations Friday and Monday the following week.     Friday - Saturday SOC - schedule subsequent discipline evaluations on consecutive days starting Monday of the following week.      Diet:   2 gram sodium diet        Activities:   activity as tolerated        Home Health Aide:  Physical Therapy Weekly and Occupational Therapy Weekly        For all post-discharge communication and subsequent orders please contact patient's primary care physician. If unable to reach primary care physician or do not receive response within 30 minutes, please  contact [facility] on call for clinical staff order clarification      I certify that this patient is confined to his home and needs intermittent skilled nursing care, physical therapy and occupational therapy.      Aurelia Perry MD  [facility]

## 2024-06-03 NOTE — PROGRESS NOTES
Pt hospitalized for recurrent epistaxis, symptomatic anemia and supratherapeutic INR.  Pt sent home on bumex 2mg PO EOD & warfarin decreased to 2.5mg T/Th/S x 5mg AOD.  Request INR ASAP.  Will contact pt to r/s f/u.

## 2024-06-03 NOTE — DISCHARGE INSTRUCTIONS
Per Pharmacy, take warfarin 5 mg Monday, Wednesday, Friday, Sunday and 2.5 mg Tuesday, Thursday, Saturday.  Follow up with coumadin clinic.

## 2024-06-03 NOTE — SUBJECTIVE & OBJECTIVE
Interval History: No acute events overnight.  Feels good today.  INR 1.9 with goal 2-3.  No epistaxis.  No leg swelling.  Plan to DC on higher dose Bumex given decreased urination on 1mg prior to admission.    Review of Systems   Constitutional:  Negative for chills, fatigue and fever.   Respiratory:  Negative for cough and shortness of breath.    Cardiovascular:  Negative for chest pain, palpitations and leg swelling.   Gastrointestinal:  Negative for abdominal pain, diarrhea, nausea and vomiting.   Genitourinary:  Negative for dysuria and urgency.   Neurological:  Negative for dizziness and headaches.   All other systems reviewed and are negative.    Objective:     Vital Signs (Most Recent):  Temp: 97.5 °F (36.4 °C) (06/03/24 1112)  Pulse: (!) 49 (06/03/24 1112)  Resp: 18 (06/03/24 1112)  BP: (!) 146/64 (06/03/24 1112)  SpO2: 96 % (06/03/24 1112) Vital Signs (24h Range):  Temp:  [97.5 °F (36.4 °C)-98.3 °F (36.8 °C)] 97.5 °F (36.4 °C)  Pulse:  [48-68] 49  Resp:  [16-19] 18  SpO2:  [93 %-99 %] 96 %  BP: (113-148)/(54-64) 146/64     Weight: 70.2 kg (154 lb 12.2 oz)  Body mass index is 25.75 kg/m².    Intake/Output Summary (Last 24 hours) at 6/3/2024 1219  Last data filed at 6/3/2024 0901  Gross per 24 hour   Intake 1320 ml   Output 1675 ml   Net -355 ml         Physical Exam  Constitutional:       Appearance: Normal appearance.   HENT:      Head: Normocephalic and atraumatic.   Cardiovascular:      Rate and Rhythm: Regular rhythm. Bradycardia present.      Heart sounds: No murmur heard.  Pulmonary:      Effort: Pulmonary effort is normal. No respiratory distress.      Breath sounds: Normal breath sounds. No wheezing or rales.   Abdominal:      General: There is no distension.      Palpations: Abdomen is soft.      Tenderness: There is no abdominal tenderness.   Musculoskeletal:         General: No deformity.      Right lower leg: No edema.      Left lower leg: No edema.   Neurological:      General: No focal deficit  present.      Mental Status: He is alert and oriented to person, place, and time. Mental status is at baseline.             Significant Labs: All pertinent labs within the past 24 hours have been reviewed.  CBC:   Recent Labs   Lab 06/02/24  0420 06/03/24  0532   WBC 5.48 5.23   HGB 7.6* 7.8*   HCT 24.9* 24.6*    190     CMP:   Recent Labs   Lab 06/01/24  1721 06/02/24  0829 06/02/24  1747 06/03/24  0745    141 141 142   K 4.0 3.5 4.2 3.8    108 108 107   CO2 20* 21* 22* 24   * 84 111* 89   BUN 63* 61* 59* 58*   CREATININE 3.5* 3.3* 3.3* 3.1*   CALCIUM 9.7 10.2 9.8 10.2   ALBUMIN 3.0* 3.2* 3.1*  --    ANIONGAP 11 12 11 11     Coagulation:   Recent Labs   Lab 06/03/24  0532   INR 1.9*       Significant Imaging: I have reviewed all pertinent imaging results/findings within the past 24 hours.

## 2024-06-03 NOTE — CONSULTS
PHARMACY CONSULT NOTE: WARFARIN  Dariel Urbina is a 83 y.o. male on warfarin therapy for mechanical AVR    Home dose: warfarin 5 mg daily  Coumadin clinic enrollment: enrolled with [facility]  INR goal: 2-3    Lab Results   Component Value Date    INR 1.9 (H) 06/03/2024    INR 2.0 (H) 06/02/2024    INR 2.7 (H) 06/01/2024     Significant drug interactions: none  Diet: Coumadin restriction    Plan  Recommend discharging on warfarin 5 mg Monday, Wednesday, Friday, Sunday and 2.5 mg Tuesday, Thursday, Saturday. Patient was supratherapeutic on 5 mg daily  I have messaged Coumadin Clinic to schedule follow up outpatient  PT/INR daily      Thank you for the consult, will continue to follow    Peter Posadas, Pharm.D., BCPS  [id number]      **Note: Consults are reviewed Monday-Friday 7:00am-3:30pm. THE ABOVE RECOMMENDATIONS ARE ONLY SUGGESTED.THE RECOMMENDATIONS SHOULD BE CONSIDERED IN CONJUNCTION WITH ALL PATIENT FACTORS. **

## 2024-06-03 NOTE — ASSESSMENT & PLAN NOTE
Advance Care Planning     Date: 05/31/2024    [facility]  I engaged the patient in a voluntary conversation about advance care planning and we specifically addressed what the goals of care would be moving forward, in light of the patient's change in clinical status, specifically if his heart or breathing stops.  We did specifically address the patient's likely prognosis, which is fair .  We explored the patient's values and preferences for future care.  The patient endorses that what is most important right now is to focus on remaining as independent as possible, symptom/pain control, and extending life as long as possible, even it it means sacrificing quality    Accordingly, we have decided that the best plan to meet the patient's goals includes continuing with treatment and Full code.     I spent a total of 10 minutes engaging the patient in this advance care planning discussion.

## 2024-06-06 ENCOUNTER — LAB VISIT (OUTPATIENT)
Dept: LAB | Facility: HOSPITAL | Age: 84
End: 2024-06-06
Attending: INTERNAL MEDICINE
Payer: MEDICARE

## 2024-06-06 ENCOUNTER — OFFICE VISIT (OUTPATIENT)
Dept: INTERNAL MEDICINE | Facility: CLINIC | Age: 84
End: 2024-06-06
Payer: MEDICARE

## 2024-06-06 ENCOUNTER — ANTI-COAG VISIT (OUTPATIENT)
Dept: CARDIOLOGY | Facility: CLINIC | Age: 84
End: 2024-06-06
Payer: MEDICARE

## 2024-06-06 VITALS
HEIGHT: 65 IN | WEIGHT: 160.06 LBS | OXYGEN SATURATION: 97 % | DIASTOLIC BLOOD PRESSURE: 64 MMHG | BODY MASS INDEX: 26.67 KG/M2 | SYSTOLIC BLOOD PRESSURE: 114 MMHG | HEART RATE: 44 BPM

## 2024-06-06 DIAGNOSIS — N18.9 ACUTE KIDNEY INJURY SUPERIMPOSED ON CHRONIC KIDNEY DISEASE: ICD-10-CM

## 2024-06-06 DIAGNOSIS — R04.0 EPISTAXIS: ICD-10-CM

## 2024-06-06 DIAGNOSIS — N17.9 ACUTE KIDNEY INJURY SUPERIMPOSED ON CHRONIC KIDNEY DISEASE: ICD-10-CM

## 2024-06-06 DIAGNOSIS — D62 ACUTE BLOOD LOSS ANEMIA: Primary | ICD-10-CM

## 2024-06-06 DIAGNOSIS — D64.9 ANEMIA, UNSPECIFIED TYPE: ICD-10-CM

## 2024-06-06 DIAGNOSIS — Z79.01 LONG TERM (CURRENT) USE OF ANTICOAGULANTS: ICD-10-CM

## 2024-06-06 DIAGNOSIS — Z79.01 CHRONIC ANTICOAGULATION: Primary | ICD-10-CM

## 2024-06-06 DIAGNOSIS — Z95.2 H/O MECHANICAL AORTIC VALVE REPLACEMENT: ICD-10-CM

## 2024-06-06 DIAGNOSIS — R04.0 RECURRENT EPISTAXIS: ICD-10-CM

## 2024-06-06 PROBLEM — J96.01 ACUTE HYPOXIC RESPIRATORY FAILURE: Status: RESOLVED | Noted: 2024-05-30 | Resolved: 2024-06-06

## 2024-06-06 PROBLEM — I50.23 ACUTE ON CHRONIC SYSTOLIC HEART FAILURE: Status: RESOLVED | Noted: 2024-05-30 | Resolved: 2024-06-06

## 2024-06-06 LAB
INR PPP: 2.2 (ref 0.8–1.2)
PROTHROMBIN TIME: 23.2 SEC (ref 9–12.5)

## 2024-06-06 PROCEDURE — 1159F MED LIST DOCD IN RCRD: CPT | Mod: HCNC,CPTII,S$GLB, | Performed by: INTERNAL MEDICINE

## 2024-06-06 PROCEDURE — 3078F DIAST BP <80 MM HG: CPT | Mod: HCNC,CPTII,S$GLB, | Performed by: INTERNAL MEDICINE

## 2024-06-06 PROCEDURE — 1125F AMNT PAIN NOTED PAIN PRSNT: CPT | Mod: HCNC,CPTII,S$GLB, | Performed by: INTERNAL MEDICINE

## 2024-06-06 PROCEDURE — 36415 COLL VENOUS BLD VENIPUNCTURE: CPT | Mod: HCNC | Performed by: INTERNAL MEDICINE

## 2024-06-06 PROCEDURE — 1111F DSCHRG MED/CURRENT MED MERGE: CPT | Mod: HCNC,CPTII,S$GLB, | Performed by: INTERNAL MEDICINE

## 2024-06-06 PROCEDURE — 99999 PR PBB SHADOW E&M-EST. PATIENT-LVL III: CPT | Mod: PBBFAC,HCNC,, | Performed by: INTERNAL MEDICINE

## 2024-06-06 PROCEDURE — 85610 PROTHROMBIN TIME: CPT | Mod: HCNC | Performed by: INTERNAL MEDICINE

## 2024-06-06 PROCEDURE — 93793 ANTICOAG MGMT PT WARFARIN: CPT | Mod: S$GLB,,,

## 2024-06-06 PROCEDURE — 3288F FALL RISK ASSESSMENT DOCD: CPT | Mod: HCNC,CPTII,S$GLB, | Performed by: INTERNAL MEDICINE

## 2024-06-06 PROCEDURE — 99214 OFFICE O/P EST MOD 30 MIN: CPT | Mod: HCNC,S$GLB,, | Performed by: INTERNAL MEDICINE

## 2024-06-06 PROCEDURE — 3074F SYST BP LT 130 MM HG: CPT | Mod: HCNC,CPTII,S$GLB, | Performed by: INTERNAL MEDICINE

## 2024-06-06 PROCEDURE — 1101F PT FALLS ASSESS-DOCD LE1/YR: CPT | Mod: HCNC,CPTII,S$GLB, | Performed by: INTERNAL MEDICINE

## 2024-06-06 NOTE — PROGRESS NOTES
This note was generated with [employer] voice recognition software. I apologize for any possible typographical errors.  Ashvin seems to have trouble with pronouns so I apologize if I Mis pronoun anyone       HE comes to follow up recent hospitalization  He comes in to follow up his anemia and   epistaxis .   He previously undergone bilateral SP ligation followed by bilateral embolization, cautery on the left septum for persistent bleeding on 11/03/2023 and again recently on 12/18/23. He recently underwent left AEA ligation on 2/28/24.  On 5/29 he was found to have INR 7.4 and h/h of 5.6 and 18.8.      He has a past medical history of CAD s/p CABG 1990's, mechanical AVR (on warfarin c/b long standing epistaxis) c/b mod-severe MR w/ mitraclip, afib, GIB 2/2 diverticulosis s/p partial colon resection, T2DM (diet controlled), gout, HLD, HTN, CKD4 (Cr 2.3-2.5), and recurrent epistaxis on warfarin .      ED course: afebrile and vitals stable. Hgb 5.6, INR 8.2, Cr 3.6. patient received 5 mg vitamin K and 1 unit blood transfusion in ED. During my interview, patient is awake, conversant and not in distress. He is oxygenating well on room air     Hospital Course:   Patient admitted to Hospital Medicine Team C on 5/29 with recurrent epistaxis, symptomatic anemia and supratherapeutic INR. He was given 1 U pRBCs for Hgb 5.6 with improvement to 7.0 and a dose of Vitamin K. Coumadin held on admission, INR downtrending, now 4.2. ENT consulted for epistaxis; recommended use of afrin and holding manual pressure should he continue to have bleeding. Elevated BNP >1000 on admission, started on IV lasix 40 mg BID. Hospital course complicated by JIM on CKD, with Cr >3. On 5/31, Hgb 6.6, 1 U pRBCs given. On the morning of 5/31, prior to receiving pRBCs, he acutely developed SOB and CP. EKG similar to prior. Given concern for pulmonary edema, he was given IV 80 mg lasix with improvement in SOB and CP. Troponin elevated with peak of 0.566,  likely demand in the setting of pulmonary edema and resolution of symptoms with IV lasix. Adequate diuresis with IV lasix, weaned to room air.  He was discharged on Bumex 2mg from 1mg prior to admit.  INR now subtherapeutic, restarted Coumadin on 6/01 at 2.5. Home dose resumed on 6/03. PT/OT recommending home with HH.        D/c labs-- 7.8/24.6 and bun/creatinine was 58/3.1 -- creatinine back in April 20204 was 2.3.  He does not have home health set up and is not getting pt.  NO recent nose bleeds.  Taking iron pills.     Has Nepho appointment in 2 weeks  and int in 3 weeks.  Cards in July       Active Problem List with Overview Notes    Diagnosis Date Noted    Acute hypoxic respiratory failure 05/30/2024    Benign essential HTN 05/30/2024    ACP (advance care planning) 05/30/2024    Acute on chronic systolic heart failure 05/30/2024     POA  After blood transfusion, requiring O2  Diuresed with IV Lasix      Dysphonia 01/18/2024    Secondary hyperparathyroidism of renal origin 01/03/2024    Dysphagia 11/21/2023    Aortic calcification 07/26/2023    Mitral insufficiency 03/22/2023    Paroxysmal atrial fibrillation 02/06/2023    Coronary artery disease involving native coronary artery of native heart without angina pectoris 12/29/2022    Acute blood loss anemia 12/21/2022    Recurrent epistaxis 12/21/2022    Supratherapeutic INR 12/21/2022    Syncope 09/29/2022    Acute kidney injury superimposed on chronic kidney disease 02/10/2022    Anemia 02/03/2022    Atherosclerosis of native coronary artery of native heart without angina pectoris 09/29/2021    Severe carpal tunnel syndrome of right wrist 05/19/2020    Bilateral carpal tunnel syndrome 04/30/2020    Numbness and tingling in both hands 04/30/2020    Hypertension associated with diabetes 09/25/2019    Hx of colonic polyp 07/28/2018    Gastrointestinal hemorrhage associated with intestinal diverticulosis 04/01/2018     4-3-18 C-scope  - Hemorrhoids found on perianal  "exam.   - Blood in the rectum and in the sigmoid colon.  - Patent end-to-end colo-colonic anastomosis,  characterized by healthy appearing mucosa.  - Diverticulosis in the sigmoid colon.   - Diverticulosis in the sigmoid colon. There was  active bleeding coming from the diverticular      opening. Clip (MR conditional) was placed.   - One 4 mm polyp in the descending colon. Resection   not attempted.   - One 10 mm polyp at the splenic flexure. Resection  not attempted. Tattooed.      NSTEMI (non-ST elevated myocardial infarction) 03/21/2018    Epistaxis 03/21/2018    Metabolic acidosis with normal anion gap and bicarbonate losses 03/20/2018    Bilateral carotid artery disease 02/09/2017    Type 2 diabetes mellitus with diabetic peripheral angiopathy without gangrene 05/27/2015    H/O mechanical aortic valve replacement 09/17/2014    Atherosclerosis of native artery of extremity with intermittent claudication 09/17/2014    Chronic anticoagulation 09/26/2012    Hyperlipidemia associated with type 2 diabetes mellitus 09/26/2012    History of colon resection 09/26/2012    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    History of gout 09/26/2012              /64 (BP Location: Left arm, Patient Position: Sitting, BP Method: Medium (Manual))   Pulse (!) 44   Ht 5' 5" (1.651 m)   Wt 72.6 kg (160 lb 0.9 oz)   SpO2 97%   BMI 26.63 kg/m²     Physical Exam  Vitals reviewed.   Constitutional:       General: He is not in acute distress.     Appearance: He is not ill-appearing or toxic-appearing.   HENT:      Head: Normocephalic and atraumatic.   Eyes:      General: No scleral icterus.  Cardiovascular:      Rate and Rhythm: Normal rate. Rhythm irregular.      Comments: Metallic click  Pulmonary:      Effort: Pulmonary effort is normal. No respiratory distress.      Breath sounds: No wheezing or rales.   Abdominal:      General: Abdomen is flat. There is no distension.      Tenderness: There is no abdominal tenderness. "   Musculoskeletal:      Right lower leg: Edema (1+ at ankles) present.      Left lower leg: Edema present.   Skin:     General: Skin is warm.   Neurological:      Mental Status: He is alert and oriented to person, place, and time. Mental status is at baseline.      Motor: No weakness.     Assessment and plan:    Acute blood loss anemia  -     Comprehensive Metabolic Panel; Future; Expected date: 06/06/2024  -     CBC Auto Differential; Future; Expected date: 06/06/2024    Acute kidney injury superimposed on chronic kidney disease  -     Ambulatory referral/consult to Home Health; Future; Expected date: 06/07/2024  -     Comprehensive Metabolic Panel; Future; Expected date: 06/06/2024    Anemia, unspecified type  -     Ambulatory referral/consult to Home Health; Future; Expected date: 06/07/2024    Epistaxis    H/O mechanical aortic valve replacement    Recurrent epistaxis     Will get home health set up-- labs in 2 weeks I will see him back in 2 months.

## 2024-06-06 NOTE — PROGRESS NOTES
[facility]    2024 11:41 AM    Assessment/Plan:    Patient presents today with therapeutic INR.    Assessment of patient findings and chart review: Reviewed     Recommendation for patient's warfarin regimen: Continue current maintenance dose    Recommend repeat INR in 1 week  _________________________________________________________________    Dariel Devon Urbina (83 y.o.) is followed by the [facility].    Anticoagulation Summary  As of 2024      INR goal:  2.0-3.0   TTR:  67.9% (11.8 y)   INR used for dosin.2 (2024)   Warfarin maintenance plan:  5 mg (5 mg x 1) every day   Weekly warfarin total:  35 mg   Plan last modified:  Laurie Patino, PharmD (2024)   Next INR check:  2024   Target end date:      Indications    Chronic anticoagulation [Z79.01]  H/O mechanical aortic valve replacement [Z95.2]                 Anticoagulation Episode Summary       INR check location:  Clinic Lab    Preferred lab:      Send INR reminders to:  [facility] COUMADIN MONITORING POOL    Comments:  [facility] only Mon - Thu // carpel tunnel release  - target low end of range          Anticoagulation Care Providers       Provider Role Specialty Phone number    Devon Garnica MD [facility] [phone]

## 2024-06-07 DIAGNOSIS — N18.4 CKD (CHRONIC KIDNEY DISEASE), STAGE IV: Primary | ICD-10-CM

## 2024-06-10 ENCOUNTER — PATIENT MESSAGE (OUTPATIENT)
Dept: INTERNAL MEDICINE | Facility: CLINIC | Age: 84
End: 2024-06-10
Payer: MEDICARE

## 2024-06-10 ENCOUNTER — OUTPATIENT CASE MANAGEMENT (OUTPATIENT)
Dept: ADMINISTRATIVE | Facility: OTHER | Age: 84
End: 2024-06-10
Payer: MEDICARE

## 2024-06-10 NOTE — LETTER
Dariel Urbina  [address] [address]      Dear Dariel Urbina,     I work with [facility]'s Outpatient Care Management Department. We received a referral to call you to discuss your medical history. These services are free of charge and are offered to [facility] patients who have recently been discharged from any of our facilities or who have medical conditions that may require the skill of a nurse to assist with management.     I am a Registered Nurse who specializes in connecting patients with available medical and financial resources as well as addressing any educational needs that may be indicated.    I attempted to reach you by telephone, but I was unsuccessful. Please call our department so that we can go over some questions with you, regarding your health.    The Outpatient Care Management Department can be reached at [phone], from 8:00AM to 4:30 PM, on Monday thru Friday.     Additionally, [facility] also has a program where a nurse is available 24/7 to answer questions or provide medical advice, their number is [phone].      Thanks,        Tamiko Lyons RN  Outpatient Care Management  Phone #: [phone]

## 2024-06-10 NOTE — PROGRESS NOTES
6/10/2024  1st attempt to complete Initial Assessment  for Outpatient Care Management, left message.  Will send portal message with unable to assess letter.

## 2024-06-11 ENCOUNTER — OUTPATIENT CASE MANAGEMENT (OUTPATIENT)
Dept: ADMINISTRATIVE | Facility: OTHER | Age: 84
End: 2024-06-11
Payer: MEDICARE

## 2024-06-11 NOTE — PROGRESS NOTES
6/11/2024  2nd attempt to complete Initial Assessment  for Outpatient Care Management, left message.

## 2024-06-12 ENCOUNTER — OUTPATIENT CASE MANAGEMENT (OUTPATIENT)
Dept: ADMINISTRATIVE | Facility: OTHER | Age: 84
End: 2024-06-12
Payer: MEDICARE

## 2024-06-12 ENCOUNTER — HOSPITAL ENCOUNTER (INPATIENT)
Facility: HOSPITAL | Age: 84
LOS: 6 days | Discharge: HOME | DRG: 135 | End: 2024-06-19
Attending: STUDENT IN AN ORGANIZED HEALTH CARE EDUCATION/TRAINING PROGRAM | Admitting: INTERNAL MEDICINE
Payer: MEDICARE

## 2024-06-12 DIAGNOSIS — R04.0 EPISTAXIS: ICD-10-CM

## 2024-06-12 DIAGNOSIS — R07.9 CHEST PAIN: ICD-10-CM

## 2024-06-12 PROBLEM — N18.4 CHRONIC KIDNEY DISEASE, STAGE 4 (SEVERE): Status: ACTIVE | Noted: 2024-06-12

## 2024-06-12 PROBLEM — R55 SYNCOPE: Status: RESOLVED | Noted: 2022-09-29 | Resolved: 2024-06-12

## 2024-06-12 LAB
ABO + RH BLD: NORMAL
ALBUMIN SERPL BCP-MCNC: 3.1 G/DL (ref 3.5–5.2)
ALP SERPL-CCNC: 56 U/L (ref 55–135)
ALT SERPL W/O P-5'-P-CCNC: 15 U/L (ref 10–44)
ANION GAP SERPL CALC-SCNC: 9 MMOL/L (ref 8–16)
AST SERPL-CCNC: 16 U/L (ref 10–40)
BASOPHILS # BLD AUTO: 0.02 K/UL (ref 0–0.2)
BASOPHILS # BLD AUTO: 0.02 K/UL (ref 0–0.2)
BASOPHILS NFR BLD: 0.3 % (ref 0–1.9)
BASOPHILS NFR BLD: 0.4 % (ref 0–1.9)
BILIRUB SERPL-MCNC: 0.4 MG/DL (ref 0.1–1)
BLD GP AB SCN CELLS X3 SERPL QL: NORMAL
BLD PROD TYP BPU: NORMAL
BLOOD UNIT EXPIRATION DATE: NORMAL
BLOOD UNIT TYPE CODE: 9500
BLOOD UNIT TYPE: NORMAL
BUN SERPL-MCNC: 73 MG/DL (ref 8–23)
CALCIUM SERPL-MCNC: 10 MG/DL (ref 8.7–10.5)
CHLORIDE SERPL-SCNC: 105 MMOL/L (ref 95–110)
CO2 SERPL-SCNC: 23 MMOL/L (ref 23–29)
CODING SYSTEM: NORMAL
CREAT SERPL-MCNC: 2.6 MG/DL (ref 0.5–1.4)
CROSSMATCH INTERPRETATION: NORMAL
DIFFERENTIAL METHOD BLD: ABNORMAL
DIFFERENTIAL METHOD BLD: ABNORMAL
DISPENSE STATUS: NORMAL
EOSINOPHIL # BLD AUTO: 0.2 K/UL (ref 0–0.5)
EOSINOPHIL # BLD AUTO: 0.2 K/UL (ref 0–0.5)
EOSINOPHIL NFR BLD: 3 % (ref 0–8)
EOSINOPHIL NFR BLD: 3.4 % (ref 0–8)
ERYTHROCYTE [DISTWIDTH] IN BLOOD BY AUTOMATED COUNT: 18.2 % (ref 11.5–14.5)
ERYTHROCYTE [DISTWIDTH] IN BLOOD BY AUTOMATED COUNT: 18.3 % (ref 11.5–14.5)
EST. GFR  (NO RACE VARIABLE): 23.9 ML/MIN/1.73 M^2
GLUCOSE SERPL-MCNC: 117 MG/DL (ref 70–110)
HCT VFR BLD AUTO: 21.4 % (ref 40–54)
HCT VFR BLD AUTO: 22.7 % (ref 40–54)
HGB BLD-MCNC: 6.6 G/DL (ref 14–18)
HGB BLD-MCNC: 7.1 G/DL (ref 14–18)
IMM GRANULOCYTES # BLD AUTO: 0.02 K/UL (ref 0–0.04)
IMM GRANULOCYTES # BLD AUTO: 0.03 K/UL (ref 0–0.04)
IMM GRANULOCYTES NFR BLD AUTO: 0.4 % (ref 0–0.5)
IMM GRANULOCYTES NFR BLD AUTO: 0.5 % (ref 0–0.5)
INR PPP: 4.4 (ref 0.8–1.2)
LYMPHOCYTES # BLD AUTO: 0.9 K/UL (ref 1–4.8)
LYMPHOCYTES # BLD AUTO: 0.9 K/UL (ref 1–4.8)
LYMPHOCYTES NFR BLD: 15.4 % (ref 18–48)
LYMPHOCYTES NFR BLD: 15.8 % (ref 18–48)
MCH RBC QN AUTO: 28.7 PG (ref 27–31)
MCH RBC QN AUTO: 29.1 PG (ref 27–31)
MCHC RBC AUTO-ENTMCNC: 30.8 G/DL (ref 32–36)
MCHC RBC AUTO-ENTMCNC: 31.3 G/DL (ref 32–36)
MCV RBC AUTO: 93 FL (ref 82–98)
MCV RBC AUTO: 93 FL (ref 82–98)
MONOCYTES # BLD AUTO: 0.5 K/UL (ref 0.3–1)
MONOCYTES # BLD AUTO: 0.5 K/UL (ref 0.3–1)
MONOCYTES NFR BLD: 8 % (ref 4–15)
MONOCYTES NFR BLD: 8.3 % (ref 4–15)
NEUTROPHILS # BLD AUTO: 4.1 K/UL (ref 1.8–7.7)
NEUTROPHILS # BLD AUTO: 4.4 K/UL (ref 1.8–7.7)
NEUTROPHILS NFR BLD: 72 % (ref 38–73)
NEUTROPHILS NFR BLD: 72.5 % (ref 38–73)
NRBC BLD-RTO: 0 /100 WBC
NRBC BLD-RTO: 0 /100 WBC
PLATELET # BLD AUTO: 171 K/UL (ref 150–450)
PLATELET # BLD AUTO: 179 K/UL (ref 150–450)
PMV BLD AUTO: 10.1 FL (ref 9.2–12.9)
PMV BLD AUTO: 10.8 FL (ref 9.2–12.9)
POTASSIUM SERPL-SCNC: 4.5 MMOL/L (ref 3.5–5.1)
PROT SERPL-MCNC: 6.6 G/DL (ref 6–8.4)
PROTHROMBIN TIME: 43.7 SEC (ref 9–12.5)
RBC # BLD AUTO: 2.3 M/UL (ref 4.6–6.2)
RBC # BLD AUTO: 2.44 M/UL (ref 4.6–6.2)
SODIUM SERPL-SCNC: 137 MMOL/L (ref 136–145)
SPECIMEN OUTDATE: NORMAL
TRANS ERYTHROCYTES VOL PATIENT: NORMAL ML
WBC # BLD AUTO: 5.64 K/UL (ref 3.9–12.7)
WBC # BLD AUTO: 6.02 K/UL (ref 3.9–12.7)

## 2024-06-12 PROCEDURE — 83880 ASSAY OF NATRIURETIC PEPTIDE: CPT | Mod: HCNC | Performed by: HOSPITALIST

## 2024-06-12 PROCEDURE — G0378 HOSPITAL OBSERVATION PER HR: HCPCS | Mod: HCNC

## 2024-06-12 PROCEDURE — 36430 TRANSFUSION BLD/BLD COMPNT: CPT | Mod: HCNC

## 2024-06-12 PROCEDURE — 2Y41X5Z PACKING OF NASAL REGION USING PACKING MATERIAL: ICD-10-PCS | Performed by: STUDENT IN AN ORGANIZED HEALTH CARE EDUCATION/TRAINING PROGRAM

## 2024-06-12 PROCEDURE — 80053 COMPREHEN METABOLIC PANEL: CPT | Mod: HCNC | Performed by: EMERGENCY MEDICINE

## 2024-06-12 PROCEDURE — 86850 RBC ANTIBODY SCREEN: CPT | Mod: HCNC | Performed by: EMERGENCY MEDICINE

## 2024-06-12 PROCEDURE — 25000003 PHARM REV CODE 250: Mod: HCNC

## 2024-06-12 PROCEDURE — 86900 BLOOD TYPING SEROLOGIC ABO: CPT | Mod: HCNC | Performed by: EMERGENCY MEDICINE

## 2024-06-12 PROCEDURE — 99285 EMERGENCY DEPT VISIT HI MDM: CPT | Mod: 25,HCNC

## 2024-06-12 PROCEDURE — P9021 RED BLOOD CELLS UNIT: HCPCS | Mod: HCNC

## 2024-06-12 PROCEDURE — 25000003 PHARM REV CODE 250: Mod: HCNC | Performed by: STUDENT IN AN ORGANIZED HEALTH CARE EDUCATION/TRAINING PROGRAM

## 2024-06-12 PROCEDURE — 30233N1 TRANSFUSION OF NONAUTOLOGOUS RED BLOOD CELLS INTO PERIPHERAL VEIN, PERCUTANEOUS APPROACH: ICD-10-PCS | Performed by: HOSPITALIST

## 2024-06-12 PROCEDURE — 85610 PROTHROMBIN TIME: CPT | Mod: HCNC | Performed by: EMERGENCY MEDICINE

## 2024-06-12 PROCEDURE — 85610 PROTHROMBIN TIME: CPT | Mod: 91,HCNC | Performed by: HOSPITALIST

## 2024-06-12 PROCEDURE — 86920 COMPATIBILITY TEST SPIN: CPT | Mod: HCNC

## 2024-06-12 PROCEDURE — 85027 COMPLETE CBC AUTOMATED: CPT | Mod: HCNC | Performed by: HOSPITALIST

## 2024-06-12 PROCEDURE — 85025 COMPLETE CBC W/AUTO DIFF WBC: CPT | Mod: 91,HCNC

## 2024-06-12 PROCEDURE — 85025 COMPLETE CBC W/AUTO DIFF WBC: CPT | Mod: HCNC | Performed by: EMERGENCY MEDICINE

## 2024-06-12 RX ORDER — TALC
6 POWDER (GRAM) TOPICAL NIGHTLY PRN
Status: DISCONTINUED | OUTPATIENT
Start: 2024-06-12 | End: 2024-06-12

## 2024-06-12 RX ORDER — GLUCAGON 1 MG
1 KIT INJECTION
Status: DISCONTINUED | OUTPATIENT
Start: 2024-06-12 | End: 2024-06-19 | Stop reason: HOSPADM

## 2024-06-12 RX ORDER — ACETAMINOPHEN 325 MG/1
650 TABLET ORAL EVERY 4 HOURS PRN
Status: DISCONTINUED | OUTPATIENT
Start: 2024-06-12 | End: 2024-06-19 | Stop reason: HOSPADM

## 2024-06-12 RX ORDER — TRAZODONE HYDROCHLORIDE 50 MG/1
50 TABLET ORAL NIGHTLY PRN
Status: DISCONTINUED | OUTPATIENT
Start: 2024-06-12 | End: 2024-06-19 | Stop reason: HOSPADM

## 2024-06-12 RX ORDER — INSULIN ASPART 100 [IU]/ML
0-5 INJECTION, SOLUTION INTRAVENOUS; SUBCUTANEOUS
Status: DISCONTINUED | OUTPATIENT
Start: 2024-06-12 | End: 2024-06-19 | Stop reason: HOSPADM

## 2024-06-12 RX ORDER — ONDANSETRON HYDROCHLORIDE 2 MG/ML
4 INJECTION, SOLUTION INTRAVENOUS EVERY 8 HOURS PRN
Status: DISCONTINUED | OUTPATIENT
Start: 2024-06-12 | End: 2024-06-19 | Stop reason: HOSPADM

## 2024-06-12 RX ORDER — ATORVASTATIN CALCIUM 40 MG/1
80 TABLET, FILM COATED ORAL NIGHTLY
Status: DISCONTINUED | OUTPATIENT
Start: 2024-06-13 | End: 2024-06-19 | Stop reason: HOSPADM

## 2024-06-12 RX ORDER — NALOXONE HCL 0.4 MG/ML
0.02 VIAL (ML) INJECTION
Status: DISCONTINUED | OUTPATIENT
Start: 2024-06-12 | End: 2024-06-19 | Stop reason: HOSPADM

## 2024-06-12 RX ORDER — ALUMINUM HYDROXIDE, MAGNESIUM HYDROXIDE, AND SIMETHICONE 1200; 120; 1200 MG/30ML; MG/30ML; MG/30ML
30 SUSPENSION ORAL 4 TIMES DAILY PRN
Status: DISCONTINUED | OUTPATIENT
Start: 2024-06-12 | End: 2024-06-19 | Stop reason: HOSPADM

## 2024-06-12 RX ORDER — ACETAMINOPHEN 500 MG
1000 TABLET ORAL
Status: COMPLETED | OUTPATIENT
Start: 2024-06-12 | End: 2024-06-12

## 2024-06-12 RX ORDER — BUMETANIDE 1 MG/1
2 TABLET ORAL EVERY OTHER DAY
Status: DISCONTINUED | OUTPATIENT
Start: 2024-06-14 | End: 2024-06-19 | Stop reason: HOSPADM

## 2024-06-12 RX ORDER — AMOXICILLIN 250 MG
1 CAPSULE ORAL DAILY PRN
Status: DISCONTINUED | OUTPATIENT
Start: 2024-06-12 | End: 2024-06-19 | Stop reason: HOSPADM

## 2024-06-12 RX ORDER — POLYETHYLENE GLYCOL 3350 17 G/17G
17 POWDER, FOR SOLUTION ORAL 2 TIMES DAILY PRN
Status: DISCONTINUED | OUTPATIENT
Start: 2024-06-12 | End: 2024-06-19 | Stop reason: HOSPADM

## 2024-06-12 RX ORDER — PROCHLORPERAZINE EDISYLATE 5 MG/ML
5 INJECTION INTRAMUSCULAR; INTRAVENOUS EVERY 6 HOURS PRN
Status: DISCONTINUED | OUTPATIENT
Start: 2024-06-12 | End: 2024-06-19 | Stop reason: HOSPADM

## 2024-06-12 RX ORDER — HYDROCODONE BITARTRATE AND ACETAMINOPHEN 500; 5 MG/1; MG/1
TABLET ORAL
Status: DISCONTINUED | OUTPATIENT
Start: 2024-06-12 | End: 2024-06-19 | Stop reason: HOSPADM

## 2024-06-12 RX ORDER — IBUPROFEN 200 MG
16 TABLET ORAL
Status: DISCONTINUED | OUTPATIENT
Start: 2024-06-12 | End: 2024-06-19 | Stop reason: HOSPADM

## 2024-06-12 RX ORDER — METOPROLOL SUCCINATE 25 MG/1
25 TABLET, EXTENDED RELEASE ORAL DAILY
Status: DISCONTINUED | OUTPATIENT
Start: 2024-06-13 | End: 2024-06-19 | Stop reason: HOSPADM

## 2024-06-12 RX ORDER — ALLOPURINOL 100 MG/1
100 TABLET ORAL DAILY
Status: DISCONTINUED | OUTPATIENT
Start: 2024-06-13 | End: 2024-06-19 | Stop reason: HOSPADM

## 2024-06-12 RX ORDER — TRANEXAMIC ACID 100 MG/ML
10 INJECTION, SOLUTION INTRAVENOUS
Status: COMPLETED | OUTPATIENT
Start: 2024-06-12 | End: 2024-06-12

## 2024-06-12 RX ORDER — IBUPROFEN 200 MG
24 TABLET ORAL
Status: DISCONTINUED | OUTPATIENT
Start: 2024-06-12 | End: 2024-06-19 | Stop reason: HOSPADM

## 2024-06-12 RX ORDER — OXYMETAZOLINE HCL 0.05 %
1 SPRAY, NON-AEROSOL (ML) NASAL
Status: COMPLETED | OUTPATIENT
Start: 2024-06-12 | End: 2024-06-12

## 2024-06-12 RX ORDER — SODIUM CHLORIDE 0.9 % (FLUSH) 0.9 %
10 SYRINGE (ML) INJECTION EVERY 6 HOURS PRN
Status: DISCONTINUED | OUTPATIENT
Start: 2024-06-12 | End: 2024-06-19 | Stop reason: HOSPADM

## 2024-06-12 RX ORDER — FERROUS GLUCONATE 324(37.5)
324 TABLET ORAL
Status: DISCONTINUED | OUTPATIENT
Start: 2024-06-13 | End: 2024-06-19 | Stop reason: HOSPADM

## 2024-06-12 RX ADMIN — TRANEXAMIC ACID 1000 MG: 100 INJECTION, SOLUTION INTRAVENOUS at 04:06

## 2024-06-12 RX ADMIN — ACETAMINOPHEN 1000 MG: 500 TABLET ORAL at 06:06

## 2024-06-12 RX ADMIN — OXYMETAZOLINE HCL 1 SPRAY: 0.05 SPRAY NASAL at 04:06

## 2024-06-12 NOTE — FIRST PROVIDER EVALUATION
Medical screening examination initiated.  I have conducted a focused provider triage encounter, findings are as follows:    Brief history of present illness:  epistaxis, on warfarin    There were no vitals filed for this visit.    Pertinent physical exam:  bleeding controlled with pressure currently    Brief workup plan:  cbc, cmp, pt/inr    Preliminary workup initiated; this workup will be continued and followed by the physician or advanced practice provider that is assigned to the patient when roomed.

## 2024-06-12 NOTE — PLAN OF CARE
Discharge instructions given to patient and spouse. Educated patient on procedure and post op instructions, medications and when to follow up within designated time frame. Patient verbalized understanding. Patient denies pain and nausea at this time. PO fluids tolerated.    No Vaccines Administered.

## 2024-06-12 NOTE — Clinical Note
Diagnosis: Epistaxis [784.7.ICD-9-CM]   Future Attending Provider: MIGUEL HUMPHREY [[id number]]   Special Needs:: No Special Needs [1]

## 2024-06-12 NOTE — ED PROVIDER NOTES
Encounter Date: 6/12/2024       History     Chief Complaint   Patient presents with    Epistaxis     Nosebleed since 4am; has nasal clamp in place, on Warfarin. Has had to have blood transfusions     83 y.o. male with CHF, CKD, mechanical aortic valve, GERD, DM, peripheral vascular disease, HTN presents for epistaxis.  Patient reports epistaxis starting at 4:00 a.m. this morning.  He was able to control bleeding with a nasal clamp.  He has history of similar episodes in the past associated with supratherapeutic INR and has received blood transfusions in the past.  He has tried Afrin at home and clamping the nose with no improvement.  He denies any other symptom including dizziness and weakness    The history is provided by the patient and medical records.     Review of patient's allergies indicates:   Allergen Reactions    Lisinopril     Losartan      Intolerance- elevates potassium level     Past Medical History:   Diagnosis Date    Anemia of chronic renal failure, stage 3 (moderate) 05/27/2015    Anticoagulant long-term use     Atherosclerosis of coronary artery bypass graft of native heart without angina pectoris 09/11/2012    3-27-18 [facility] Two vessel coronary artery disease.   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    Bilateral carotid artery disease 02/09/2017    Bleeding from the nose     Bleeding nose 03/21/2018    Cancer     Cataract     CHF (congestive heart failure)     CKD (chronic kidney disease) stage 3, GFR 30-59 ml/min 05/27/2015    Claudication of left lower extremity 09/17/2014    Colon polyp     Encounter for blood transfusion     Gastroesophageal reflux disease without esophagitis 03/19/2018    Gastrointestinal hemorrhage associated with intestinal diverticulosis 04/01/2018    Glaucoma     H/O mechanical aortic valve replacement 09/17/2014    History of gout 09/26/2012    Hyperparathyroidism due to renal insufficiency 07/27/2015    Internal hemorrhoid 04/03/2018    Long  term current use of anticoagulant therapy 09/26/2012    Mechanical heart valve present     Metabolic acidosis with normal anion gap and bicarbonate losses 03/20/2018    Mixed hyperlipidemia 09/26/2012    NSTEMI (non-ST elevated myocardial infarction) 03/21/2018    Obesity, diabetes, and hypertension syndrome 02/23/2016    Paroxysmal atrial fibrillation 02/06/2023    PVD (peripheral vascular disease) 09/11/2012    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     Past Surgical History:   Procedure Laterality Date    BACK SURGERY      CARDIAC CATHETERIZATION      CARDIAC VALVE REPLACEMENT      CARDIAC VALVE SURGERY      CARPAL TUNNEL RELEASE Right 05/19/2020    Procedure: RELEASE, CARPAL TUNNEL;  Surgeon: Rupesh Norris Jr., MD;  Location: [facility];  Service: Plastics;  Laterality: Right;    CATARACT EXTRACTION Left 11/13/2022        COLON SURGERY      COLONOSCOPY N/A 03/31/2017    Procedure: COLONOSCOPY;  Surgeon: Bruno Raymond MD;  Location: [facility] (4TH FLR);  Service: Endoscopy;  Laterality: N/A;  Patient's wife requesting date.    COLONOSCOPY N/A 04/03/2018    Procedure: COLONOSCOPY;  Surgeon: Bonifacio Pelletier MD;  Location: [facility] ENDO (2ND FLR);  Service: Endoscopy;  Laterality: N/A;    COLONOSCOPY N/A 08/13/2018    Procedure: COLONOSCOPY;  Surgeon: Kam Barba MD;  Location: [facility] ENDO (2ND FLR);  Service: Endoscopy;  Laterality: N/A;  2nd floor: PA pressure 49; hx of moderate-severe valve disease     per Coumadin clinic-Patient can hold 5 days with lovenox bridge       ok to schedule per Katarina    CORONARY ANGIOGRAPHY N/A 10/04/2021    Procedure: Left heart cath +/-  peripheral angiogram;  Surgeon: Jose Ruiz MD;  Location: [facility] CATH LAB;  Service: Cardiology;  Laterality: N/A;    CORONARY ANGIOGRAPHY N/A 3/2/2023    Procedure: Angiogram, Coronary;  Surgeon: Devon Garnica MD;  Location: [facility] CATH LAB;  Service: Cardiology;  Laterality: N/A;    CORONARY ANGIOPLASTY      CORONARY ARTERY BYPASS GRAFT      CORONARY BYPASS GRAFT ANGIOGRAPHY  10/04/2021    Procedure: Bypass graft study;  Surgeon: Jose Ruiz MD;  Location: [facility] CATH LAB;  Service: Cardiology;;    CORONARY BYPASS GRAFT ANGIOGRAPHY  3/2/2023    Procedure: Bypass graft study;  Surgeon: Devon Garnica MD;  Location: [facility] CATH LAB;  Service: Cardiology;;    ECHOCARDIOGRAM,TRANSESOPHAGEAL N/A 4/14/2023    Procedure: Transesophageal echo (KIRSTEN) intra-procedure log documentation;  Surgeon: [facility] Diagnostic Provider;  Location: [facility] EP LAB;  Service: Cardiology;  Laterality: N/A;    ENDOSCOPIC NASAL CAUTERIZATION Bilateral 11/3/2023    Procedure: CAUTERIZATION, NOSE, ENDOSCOPIC;  Surgeon: Jono Russell MD;  Location: [facility] OR 2ND FLR;  Service: ENT;  Laterality: Bilateral;    ENDOSCOPIC NASAL CAUTERIZATION N/A 12/18/2023    Procedure: CAUTERIZATION, NOSE, ENDOSCOPIC;  Surgeon: Jono Russell MD;  Location: [facility] OR 2ND FLR;  Service: ENT;  Laterality: N/A;    EYE SURGERY      FESS, WITH IMAGING GUIDANCE Left 2/28/2024    Procedure: FESS, WITH IMAGING GUIDANCE;  Surgeon: Jono Russell MD;  Location: [facility] OR 2ND FLR;  Service: ENT;  Laterality: Left;  Scan loaded [facility]    FUNCTIONAL ENDOSCOPIC SINUS SURGERY (FESS) Bilateral 6/14/2023    Procedure: FESS (FUNCTIONAL ENDOSCOPIC SINUS SURGERY);  Surgeon: Jono Russell MD;  Location: [facility] OR 2ND FLR;  Service: ENT;  Laterality: Bilateral;    HERNIA REPAIR      INTRAOCULAR PROSTHESES INSERTION Left 11/13/2022    Procedure: INSERTION, IOL PROSTHESIS;  Surgeon: Alia Mckeon MD;  Location: [facility] OR 1ST FLR;  Service: Ophthalmology;  Laterality:  Left;    INTRAOCULAR PROSTHESES INSERTION Right 12/04/2022    Procedure: INSERTION, IOL PROSTHESIS;  Surgeon: Alia Mckeon MD;  Location: [facility] OR 1ST FLR;  Service: Ophthalmology;  Laterality: Right;    LIGATION, ARTERY, ETHMOIDAL Left 2/28/2024    Procedure: LIGATION, ARTERY, ETHMOIDAL;  Surgeon: Jono Russell MD;  Location: [facility] OR 2ND FLR;  Service: ENT;  Laterality: Left;    LIGATION, ARTERY, SPHENOPALATINE, ENDOSCOPIC Bilateral 6/14/2023    Procedure: LIGATION, ARTERY, SPHENOPALATINE, ENDOSCOPIC;  Surgeon: Jono Russell MD;  Location: [facility] OR 2ND FLR;  Service: ENT;  Laterality: Bilateral;    PHACOEMULSIFICATION OF CATARACT Left 11/13/2022    Procedure: PHACOEMULSIFICATION, CATARACT;  Surgeon: Alia Mckeon MD;  [facility];  Service: Ophthalmology;  Laterality: Left;    PHACOEMULSIFICATION OF CATARACT Right 12/04/2022    Procedure: PHACOEMULSIFICATION, CATARACT;  Surgeon: Alia Mckeon MD;  [facility];  Service: Ophthalmology;  Laterality: Right;    SPINE SURGERY      TRANSESOPHAGEAL ECHOCARDIOGRAPHY N/A 3/22/2023    Procedure: ECHOCARDIOGRAM, TRANSESOPHAGEAL;  Surgeon: Thuan Diagnostic Provider;  Location: [facility] EP LAB;  Service: Anesthesiology;  Laterality: N/A;    TRANSESOPHAGEAL ECHOCARDIOGRAPHY N/A 4/11/2023    Procedure: ECHOCARDIOGRAM, TRANSESOPHAGEAL;  Surgeon: Zenia Diagnostic Provider;  Location: [facility] EP LAB;  Service: Anesthesiology;  Laterality: N/A;    VASECTOMY       Family History   Problem Relation Name Age of Onset    Heart failure Mother      Heart disease Mother      Heart failure Father      Heart disease Father      Alcohol abuse Father      Heart failure Brother      Heart disease Brother      Diabetes Brother      No Known Problems Sister      No Known Problems Maternal Grandmother      No Known Problems Maternal Grandfather      No Known Problems Paternal Grandmother      No Known Problems Paternal Grandfather      Heart disease Sister       No Known Problems Maternal Aunt      No Known Problems Maternal Uncle      No Known Problems Paternal Aunt      No Known Problems Paternal Uncle      Amblyopia Neg Hx      Blindness Neg Hx      Cancer Neg Hx      Cataracts Neg Hx      Glaucoma Neg Hx      Hypertension Neg Hx      Macular degeneration Neg Hx      Retinal detachment Neg Hx      Strabismus Neg Hx      Stroke Neg Hx      Thyroid disease Neg Hx      Anemia Neg Hx      Arrhythmia Neg Hx      Asthma Neg Hx      Clotting disorder Neg Hx      Fainting Neg Hx      Heart attack Neg Hx      Hyperlipidemia Neg Hx      Atrial Septal Defect Neg Hx      Melanoma Neg Hx       Social History     Tobacco Use    Smoking status: Former     Current packs/day: 0.00     Average packs/day: 1 pack/day for 20.0 years (20.0 ttl pk-yrs)     Types: Cigarettes     Start date: 1960     Quit date: 1980     Years since quittin.7     Passive exposure: Never    Smokeless tobacco: Never   Substance Use Topics    Alcohol use: No    Drug use: No     Review of Systems    Physical Exam     Initial Vitals [24 1406]   BP Pulse Resp Temp SpO2   (!) 118/57 61 19 98.1 °F (36.7 °C) 96 %      MAP       --         Physical Exam    Nursing note and vitals reviewed.  Constitutional: He appears well-developed and well-nourished. No distress.   HENT:   Head: Normocephalic and atraumatic.   Nose: Epistaxis (left sided) is observed.   Eyes: EOM are normal. Pupils are equal, round, and reactive to light.   Neck: Neck supple. No tracheal deviation present.   Normal range of motion.  Cardiovascular:  Normal rate. An irregularly irregular rhythm present.           No murmur heard.  Pulmonary/Chest: Breath sounds normal. No stridor. No respiratory distress. He has no wheezes.   Abdominal: Abdomen is soft. He exhibits no distension. There is no abdominal tenderness.   Musculoskeletal:         General: Normal range of motion.      Cervical back: Normal range of motion and neck supple.      Neurological: He is alert and oriented to person, place, and time.   Skin: Skin is warm and dry.         ED Course   Procedures  Labs Reviewed   CBC W/ AUTO DIFFERENTIAL - Abnormal; Notable for the following components:       Result Value    RBC 2.44 (*)     Hemoglobin 7.1 (*)     Hematocrit 22.7 (*)     MCHC 31.3 (*)     RDW 18.3 (*)     Lymph # 0.9 (*)     Lymph % 15.8 (*)     All other components within normal limits   COMPREHENSIVE METABOLIC PANEL - Abnormal; Notable for the following components:    Glucose 117 (*)     BUN 73 (*)     Creatinine 2.6 (*)     Albumin 3.1 (*)     eGFR 23.9 (*)     All other components within normal limits   PROTIME-INR - Abnormal; Notable for the following components:    Prothrombin Time 43.7 (*)     INR 4.4 (*)     All other components within normal limits   CBC W/ AUTO DIFFERENTIAL - Abnormal; Notable for the following components:    RBC 2.30 (*)     Hemoglobin 6.6 (*)     Hematocrit 21.4 (*)     MCHC 30.8 (*)     RDW 18.2 (*)     Lymph # 0.9 (*)     Lymph % 15.4 (*)     All other components within normal limits   CBC WITHOUT DIFFERENTIAL - Abnormal; Notable for the following components:    RBC 2.64 (*)     Hemoglobin 7.5 (*)     Hematocrit 24.3 (*)     MCHC 30.9 (*)     RDW 18.6 (*)     All other components within normal limits   PROTIME-INR - Abnormal; Notable for the following components:    Prothrombin Time 37.3 (*)     INR 3.7 (*)     All other components within normal limits   B-TYPE NATRIURETIC PEPTIDE - Abnormal; Notable for the following components:     (*)     All other components within normal limits   TYPE & SCREEN   POCT GLUCOSE MONITORING CONTINUOUS   PREPARE RBC SOFT          Imaging Results              X-Ray Chest AP Portable (Final result)  Result time 06/12/24 23:23:26      Final result by Jonathan Jean DO (06/12/24 23:23:26)                   Impression:      Small right pleural effusion.    Cardiomegaly.      Electronically signed by: Jonathan  Miranda  Date:    06/12/2024  Time:    23:23               Narrative:    EXAMINATION:  XR CHEST AP PORTABLE    CLINICAL HISTORY:  eval for any pulm edema;    TECHNIQUE:  Single frontal view of the chest was performed.    COMPARISON:  05/30/2024.    FINDINGS:  There is a small right pleural effusion.  The lungs are clear.  There is no pneumothorax.  The cardiac silhouette is enlarged.  There are surgical clips.  There are median sternotomy wires.  There are several remote right-sided rib fractures.                                       Medications   0.9%  NaCl infusion (for blood administration) (has no administration in time range)   allopurinoL tablet 100 mg (has no administration in time range)   bumetanide tablet 2 mg (has no administration in time range)   ferrous gluconate tablet 324 mg (has no administration in time range)   metoprolol succinate (TOPROL-XL) 24 hr tablet 25 mg (has no administration in time range)   atorvastatin tablet 80 mg (has no administration in time range)   traZODone tablet 50 mg (has no administration in time range)   sodium chloride 0.65 % nasal spray 2 spray (has no administration in time range)   sodium chloride 0.9% flush 10 mL (has no administration in time range)   ondansetron injection 4 mg (has no administration in time range)   prochlorperazine injection Soln 5 mg (has no administration in time range)   polyethylene glycol packet 17 g (has no administration in time range)   senna-docusate 8.6-50 mg per tablet 1 tablet (has no administration in time range)   aluminum-magnesium hydroxide-simethicone 200-200-20 mg/5 mL suspension 30 mL (has no administration in time range)   acetaminophen tablet 650 mg (has no administration in time range)   naloxone 0.4 mg/mL injection 0.02 mg (has no administration in time range)   glucose chewable tablet 16 g (has no administration in time range)   glucose chewable tablet 24 g (has no administration in time range)   glucagon (human recombinant)  injection 1 mg (has no administration in time range)   insulin aspart U-100 pen 0-5 Units (has no administration in time range)   dextrose 10% bolus 125 mL 125 mL (has no administration in time range)   dextrose 10% bolus 250 mL 250 mL (has no administration in time range)   oxymetazoline 0.05 % nasal spray 1 spray (1 spray Each Nostril Given by Provider 6/12/24 1624)   tranexamic acid injection Soln 1,000 mg (1,000 mg Nasal Given by Provider 6/12/24 1624)   acetaminophen tablet 1,000 mg (1,000 mg Oral Given 6/12/24 1845)     Medical Decision Making  Pt is an 83 year old male with PMHx significant for CHF, CKD, mechanical aortic valve on warfarin who presents for epistaxis which began today. He reports frequent use of afrin without resolution. PT/INR 43/4.4. Hemoglobin 7.1, decreased from 7.8 one week ago. TXA soaked long anterior rhino rocket placed with control of bleeding. Repeat hemoglobin 6.6. pt consented and transfused. Case discussed with hospital medicine. Plan for admission     Amount and/or Complexity of Data Reviewed  Labs: ordered. Decision-making details documented in ED Course.    Risk  OTC drugs.  Prescription drug management.               ED Course as of 06/13/24 0055 Wed Jun 12, 2024   1534 Hemoglobin(!): 7.1  7.8 last week [OK]   1535 Creatinine(!): 2.6  At approximate baseline [OK]      ED Course User Index  [OK] Meliton Butler MD                           Clinical Impression:  Final diagnoses:  [R04.0] Epistaxis          ED Disposition Condition    Observation                 Guy Bazan Jr., MD  Resident  06/13/24 0055

## 2024-06-12 NOTE — ED TRIAGE NOTES
Patient had a nosebleed since 4 am. Patient has a history of extensive nosebleeds, causing him to need blood transfusions. The patient is on blood thinners.

## 2024-06-12 NOTE — PROGRESS NOTES
"6/12/24   OPCM RN received call back from Ameena, pt's spouse, she reports that Mr. Vieira has had a nosebleed since 0400; when he leans forward it is "dripping like a faucet." He has tried nose clamp and Afrin spray as this is a recurrent issue, but has been unable to stop the bleeding. OPCM RN advised Ameena to bring him to the ER for evaluation, she verbalized understanding. She voiced interest in OPCM enrollment, she will call back after he is evaluated.     "

## 2024-06-13 ENCOUNTER — TELEPHONE (OUTPATIENT)
Dept: INTERNAL MEDICINE | Facility: CLINIC | Age: 84
End: 2024-06-13
Payer: MEDICARE

## 2024-06-13 LAB
ANION GAP SERPL CALC-SCNC: 10 MMOL/L (ref 8–16)
BLD PROD TYP BPU: NORMAL
BLD PROD TYP BPU: NORMAL
BLOOD UNIT EXPIRATION DATE: NORMAL
BLOOD UNIT EXPIRATION DATE: NORMAL
BLOOD UNIT TYPE CODE: 9500
BLOOD UNIT TYPE CODE: 9500
BLOOD UNIT TYPE: NORMAL
BLOOD UNIT TYPE: NORMAL
BNP SERPL-MCNC: 808 PG/ML (ref 0–99)
BUN SERPL-MCNC: 79 MG/DL (ref 8–23)
CALCIUM SERPL-MCNC: 9.8 MG/DL (ref 8.7–10.5)
CHLORIDE SERPL-SCNC: 106 MMOL/L (ref 95–110)
CO2 SERPL-SCNC: 23 MMOL/L (ref 23–29)
CODING SYSTEM: NORMAL
CODING SYSTEM: NORMAL
CREAT SERPL-MCNC: 2.4 MG/DL (ref 0.5–1.4)
CROSSMATCH INTERPRETATION: NORMAL
CROSSMATCH INTERPRETATION: NORMAL
DISPENSE STATUS: NORMAL
DISPENSE STATUS: NORMAL
ERYTHROCYTE [DISTWIDTH] IN BLOOD BY AUTOMATED COUNT: 17.1 % (ref 11.5–14.5)
ERYTHROCYTE [DISTWIDTH] IN BLOOD BY AUTOMATED COUNT: 18.6 % (ref 11.5–14.5)
ERYTHROCYTE [DISTWIDTH] IN BLOOD BY AUTOMATED COUNT: 18.9 % (ref 11.5–14.5)
ERYTHROCYTE [DISTWIDTH] IN BLOOD BY AUTOMATED COUNT: 19.4 % (ref 11.5–14.5)
EST. GFR  (NO RACE VARIABLE): 26.3 ML/MIN/1.73 M^2
GLUCOSE SERPL-MCNC: 70 MG/DL (ref 70–110)
HCT VFR BLD AUTO: 17.5 % (ref 40–54)
HCT VFR BLD AUTO: 23.2 % (ref 40–54)
HCT VFR BLD AUTO: 23.3 % (ref 40–54)
HCT VFR BLD AUTO: 24.3 % (ref 40–54)
HGB BLD-MCNC: 5.4 G/DL (ref 14–18)
HGB BLD-MCNC: 7.1 G/DL (ref 14–18)
HGB BLD-MCNC: 7.4 G/DL (ref 14–18)
HGB BLD-MCNC: 7.5 G/DL (ref 14–18)
INR PPP: 3.7 (ref 0.8–1.2)
INR PPP: 6.2 (ref 0.8–1.2)
MAGNESIUM SERPL-MCNC: 2 MG/DL (ref 1.6–2.6)
MCH RBC QN AUTO: 28.4 PG (ref 27–31)
MCH RBC QN AUTO: 28.9 PG (ref 27–31)
MCH RBC QN AUTO: 28.9 PG (ref 27–31)
MCH RBC QN AUTO: 29.2 PG (ref 27–31)
MCHC RBC AUTO-ENTMCNC: 30.5 G/DL (ref 32–36)
MCHC RBC AUTO-ENTMCNC: 30.9 G/DL (ref 32–36)
MCHC RBC AUTO-ENTMCNC: 30.9 G/DL (ref 32–36)
MCHC RBC AUTO-ENTMCNC: 31.9 G/DL (ref 32–36)
MCV RBC AUTO: 91 FL (ref 82–98)
MCV RBC AUTO: 92 FL (ref 82–98)
MCV RBC AUTO: 94 FL (ref 82–98)
MCV RBC AUTO: 96 FL (ref 82–98)
PHOSPHATE SERPL-MCNC: 4.6 MG/DL (ref 2.7–4.5)
PLATELET # BLD AUTO: 106 K/UL (ref 150–450)
PLATELET # BLD AUTO: 111 K/UL (ref 150–450)
PLATELET # BLD AUTO: 152 K/UL (ref 150–450)
PLATELET # BLD AUTO: 155 K/UL (ref 150–450)
PMV BLD AUTO: 10.2 FL (ref 9.2–12.9)
PMV BLD AUTO: 10.4 FL (ref 9.2–12.9)
PMV BLD AUTO: 10.7 FL (ref 9.2–12.9)
PMV BLD AUTO: 9.7 FL (ref 9.2–12.9)
POCT GLUCOSE: 81 MG/DL (ref 70–110)
POTASSIUM SERPL-SCNC: 4.2 MMOL/L (ref 3.5–5.1)
PROTHROMBIN TIME: 37.3 SEC (ref 9–12.5)
PROTHROMBIN TIME: 60.1 SEC (ref 9–12.5)
RBC # BLD AUTO: 1.87 M/UL (ref 4.6–6.2)
RBC # BLD AUTO: 2.43 M/UL (ref 4.6–6.2)
RBC # BLD AUTO: 2.56 M/UL (ref 4.6–6.2)
RBC # BLD AUTO: 2.64 M/UL (ref 4.6–6.2)
SODIUM SERPL-SCNC: 139 MMOL/L (ref 136–145)
TRANS ERYTHROCYTES VOL PATIENT: NORMAL ML
TRANS ERYTHROCYTES VOL PATIENT: NORMAL ML
WBC # BLD AUTO: 4.05 K/UL (ref 3.9–12.7)
WBC # BLD AUTO: 5.44 K/UL (ref 3.9–12.7)
WBC # BLD AUTO: 6.65 K/UL (ref 3.9–12.7)
WBC # BLD AUTO: 7.04 K/UL (ref 3.9–12.7)

## 2024-06-13 PROCEDURE — 63600175 PHARM REV CODE 636 W HCPCS: Mod: HCNC | Performed by: HOSPITALIST

## 2024-06-13 PROCEDURE — 80048 BASIC METABOLIC PNL TOTAL CA: CPT | Mod: HCNC | Performed by: HOSPITALIST

## 2024-06-13 PROCEDURE — 25000003 PHARM REV CODE 250: Mod: HCNC

## 2024-06-13 PROCEDURE — P9021 RED BLOOD CELLS UNIT: HCPCS | Mod: HCNC | Performed by: HOSPITALIST

## 2024-06-13 PROCEDURE — 86920 COMPATIBILITY TEST SPIN: CPT | Mod: HCNC | Performed by: HOSPITALIST

## 2024-06-13 PROCEDURE — 12000002 HC ACUTE/MED SURGE SEMI-PRIVATE ROOM: Mod: HCNC

## 2024-06-13 PROCEDURE — 94761 N-INVAS EAR/PLS OXIMETRY MLT: CPT | Mod: HCNC

## 2024-06-13 PROCEDURE — 25000003 PHARM REV CODE 250: Mod: HCNC | Performed by: HOSPITALIST

## 2024-06-13 PROCEDURE — 83735 ASSAY OF MAGNESIUM: CPT | Mod: HCNC | Performed by: HOSPITALIST

## 2024-06-13 PROCEDURE — 85027 COMPLETE CBC AUTOMATED: CPT | Mod: 91,HCNC | Performed by: HOSPITALIST

## 2024-06-13 PROCEDURE — 85610 PROTHROMBIN TIME: CPT | Mod: HCNC | Performed by: HOSPITALIST

## 2024-06-13 PROCEDURE — 84100 ASSAY OF PHOSPHORUS: CPT | Mod: HCNC | Performed by: HOSPITALIST

## 2024-06-13 RX ORDER — AMOXICILLIN AND CLAVULANATE POTASSIUM 875; 125 MG/1; MG/1
1 TABLET, FILM COATED ORAL EVERY 24 HOURS
Status: DISCONTINUED | OUTPATIENT
Start: 2024-06-13 | End: 2024-06-13

## 2024-06-13 RX ORDER — HYDROCODONE BITARTRATE AND ACETAMINOPHEN 500; 5 MG/1; MG/1
TABLET ORAL
Status: DISCONTINUED | OUTPATIENT
Start: 2024-06-13 | End: 2024-06-19 | Stop reason: HOSPADM

## 2024-06-13 RX ORDER — OXYMETAZOLINE HCL 0.05 %
2 SPRAY, NON-AEROSOL (ML) NASAL
Status: DISPENSED | OUTPATIENT
Start: 2024-06-13 | End: 2024-06-16

## 2024-06-13 RX ORDER — FUROSEMIDE 10 MG/ML
60 INJECTION INTRAMUSCULAR; INTRAVENOUS ONCE
Status: COMPLETED | OUTPATIENT
Start: 2024-06-13 | End: 2024-06-13

## 2024-06-13 RX ORDER — AMOXICILLIN AND CLAVULANATE POTASSIUM 500; 125 MG/1; MG/1
1 TABLET, FILM COATED ORAL 2 TIMES DAILY
Status: DISCONTINUED | OUTPATIENT
Start: 2024-06-13 | End: 2024-06-19

## 2024-06-13 RX ADMIN — ACETAMINOPHEN 650 MG: 325 TABLET ORAL at 11:06

## 2024-06-13 RX ADMIN — METOPROLOL SUCCINATE 25 MG: 25 TABLET, EXTENDED RELEASE ORAL at 08:06

## 2024-06-13 RX ADMIN — OXYMETAZOLINE HYDROCHLORIDE 2 SPRAY: 0.05 SPRAY NASAL at 10:06

## 2024-06-13 RX ADMIN — AMOXICILLIN AND CLAVULANATE POTASSIUM 1 TABLET: 875; 125 TABLET, FILM COATED ORAL at 11:06

## 2024-06-13 RX ADMIN — NASAL 2 SPRAY: 6.5 SPRAY NASAL at 03:06

## 2024-06-13 RX ADMIN — Medication 324 MG: at 08:06

## 2024-06-13 RX ADMIN — PHYTONADIONE 10 MG: 10 INJECTION, EMULSION INTRAMUSCULAR; INTRAVENOUS; SUBCUTANEOUS at 07:06

## 2024-06-13 RX ADMIN — FUROSEMIDE 60 MG: 10 INJECTION, SOLUTION INTRAMUSCULAR; INTRAVENOUS at 08:06

## 2024-06-13 RX ADMIN — ALLOPURINOL 100 MG: 100 TABLET ORAL at 08:06

## 2024-06-13 RX ADMIN — ATORVASTATIN CALCIUM 80 MG: 40 TABLET, FILM COATED ORAL at 08:06

## 2024-06-13 RX ADMIN — AMOXICILLIN AND CLAVULANATE POTASSIUM 500 MG: 500; 125 TABLET, FILM COATED ORAL at 08:06

## 2024-06-13 RX ADMIN — OXYMETAZOLINE HYDROCHLORIDE 2 SPRAY: 0.05 SPRAY NASAL at 09:06

## 2024-06-13 NOTE — PROGRESS NOTES
Pharmacist Renal Dose Adjustment Note    Dariel Urbina is a 83 y.o. male being treated with amoxicillin / clavulanate for lower respiratory infection.      Patient Data:    Vital Signs (Most Recent):  Temp: 97.8 °F (36.6 °C) (06/13/24 1414)  Pulse: (!) 54 (06/13/24 1414)  Resp: 17 (06/13/24 1414)  BP: 124/80 (06/13/24 1414)  SpO2: 98 % (06/13/24 1414) Vital Signs (72h Range):  Temp:  [97.4 °F (36.3 °C)-98.6 °F (37 °C)]   Pulse:  [49-72]   Resp:  [14-24]   BP: (117-150)/(57-80)   SpO2:  [96 %-100 %]      Recent Labs   Lab 06/12/24  1422 06/13/24  0540   CREATININE 2.6* 2.4*     Serum creatinine:  2.4 mg/dL (H) 06/13/24 0540  Estimated creatinine clearance:  20.6 mL/min (A)    Amoxicillin / clavulanate 875 / 125 mg oral twice daily will be changed to amoxicillin / clavulanate 500 / 125 mg oral twice daily.     Pharmacist's Name:  Pal Tony  Pharmacist's Extension:  [phone]

## 2024-06-13 NOTE — ASSESSMENT & PLAN NOTE
-secondary to epistaxis  -receiving 1unit PRBC this evening  -Check CBC every 6 hours and INR every 12 hours   -given hemostasis - last dose of warfarin on Tuesday.  PharmD consult to assist with management.    >will hold any FFP or vitamin K now - will f/u midnight INR if rising or not downtrending - will reconsider. Reversal vs lowering INR therapeies. 21.

## 2024-06-13 NOTE — ASSESSMENT & PLAN NOTE
83M w extensive PMH, known to ENT service for refractory epistaxis status post bilateral SP ligation followed by bilateral embolization, cautery on the left septum for persistent bleeding on 11/03/2023 and again recently on 12/18/23. He recently underwent left AEA ligation on 2/28/24 and nasal cautery on 5/21. Presents to ED w further bleeding, rhinorocket placed and remains hemostatic. Supratherapeutic INR.     - No acute ENT intervention at this time as patient is currently hemostatic with packing, will keep in place   - Patient to remain inpatient while packing in place, ENT will CTM  - Anti-staph prophylaxis while rhinorocket in place   - Recommend prompt correction of supratherapeutic INR, as this is likely cause of refractory bleeding  - Recommend against any instrumentation of the nose  - Recommend against nasal cannula - if patient requires supp O2, recommend humidified O2 via face tent or mask  - For further bleeding, recommend liberally spraying Afrin and holding manual pressure for 10 min. For further bleeding, page ENT on call. May require angiography and poss emobilization w IR as salvage treatment  - Please page w questions or concerns

## 2024-06-13 NOTE — PROGRESS NOTES
[facility] - Emergency Dept  [facility] Medicine  Progress Note    Patient Name: Dariel Urbina  MRN: [id number]  Patient Class: IP- Inpatient   Admission Date: 6/12/2024  Length of Stay: 0 days  Attending Physician: Aga Hernandez MD  Primary Care Provider: Devon Langston Jr., MD        Subjective:     Principal Problem:Epistaxis        HPI:  84 y/o WM CAD s/p CABG, MR s/p mitraclib, Afib, Type 2 DM, CKD4,  s/p mechanical aortic valve placement on warfarin anticoagulation w/ hx of recurrent epistaxis presents to the ED for epistaxis.  He reports painless bleeding from left nare onset @ 0400 today.  He did not want to come to the hospital, but wife advised him to.  He is found with supratherapeutic INR 4.4.  His last dose of warfarin was yesterday.      ENT has placed rhino rocket to left nare. Consult note pending.  Patient reports its always left nare that has recurrent bleeding. He has Hemoglobin 6.6 and 1unit PRBC ordered in ED and infusing during my interview.      Pt has no complaints of chest pain/dyspnea, no nausea/vomiting, no hematemesis, hematuria, or other sites of bruising or bleeding.     He was recently admitted to [facility] for same reason (5/29-6/3), INR was >8 last admission. Required PRBC transfusion.  Hospital course was complicated by JIM on CKD and acute on chronic heart failure exacerbation.     Overview/Hospital Course:  No notes on file    Interval History:   6/13: Hg drop, INR elevated, nasal rocket in place    Review of Systems   Respiratory:  Negative for chest tightness and shortness of breath.    Cardiovascular:  Negative for chest pain.   Gastrointestinal: Negative.    Genitourinary: Negative.    Psychiatric/Behavioral:  Negative for confusion.      Objective:     Vital Signs (Most Recent):  Temp: 98 °F (36.7 °C) (06/13/24 1536)  Pulse: (!) 57 (06/13/24 1536)  Resp: 16 (06/13/24 1536)  BP: 132/70 (06/13/24 1536)  SpO2: 99 % (06/13/24 1536) Vital Signs (24h Range):  Temp:  [97.4 °F (36.3  "°C)-98.6 °F (37 °C)] 98 °F (36.7 °C)  Pulse:  [49-72] 57  Resp:  [14-24] 16  SpO2:  [98 %-100 %] 99 %  BP: (117-150)/(60-80) 132/70     Weight: 69.9 kg (154 lb)  Body mass index is 25.63 kg/m².  No intake or output data in the 24 hours ending 24 1539      Physical Exam  Vitals and nursing note reviewed.   Constitutional:       General: He is not in acute distress.     Appearance: He is not diaphoretic.   HENT:      Head: Normocephalic and atraumatic.      Nose:      Comments: Left nare rhino-rocket  Eyes:      General: No scleral icterus.  Cardiovascular:      Rate and Rhythm: Regular rhythm. Bradycardia present.      Comments: Mechanical click  Pulmonary:      Effort: Pulmonary effort is normal. No respiratory distress.      Breath sounds: No wheezing or rales.   Abdominal:      General: Bowel sounds are normal.      Palpations: Abdomen is soft.   Musculoskeletal:      Right lower leg: No edema.      Left lower le+ Pitting Edema present.   Skin:     General: Skin is warm and dry.   Neurological:      General: No focal deficit present.      Mental Status: He is alert and oriented to person, place, and time.             Significant Labs: All pertinent labs within the past 24 hours have been reviewed.    Significant Imaging: I have reviewed all pertinent imaging results/findings within the past 24 hours.      Assessment/Plan:      * Epistaxis  -recurrent left nare bleeding with supratherapeutic INR causing acute blood loss anemia.    -s/p Rhinorocket placement in ED  - w/ afrin spray and tranexamic acid intranasally   -Consult ENT in AM     -per last ENT inpatient consult recs   Avoid use of nasal cannula if any hypoxia develops - use only humidified o2 via face mask/face tent  For recurrent bleeding - given presence of rhino rocket - would contact ENT on-call  Prior ENT notes have mentioned possible need for angiography and embolization with IR.       Prior history of ENT procedures "bilateral SP ligation " "followed by bilateral embolization, cautery on the left septum for persistent bleeding on 11/03/2023 and again recently on 12/18/23. He recently underwent left AEA ligation on 2/28/24. Most recently seen in clinic 5/21 and underwent L sided cautery."    Coronary artery disease involving native coronary artery of native heart without angina pectoris  Patient with known CAD s/p CABG, which is controlled Will continue  beta blocker, ASA, and Statin and monitor for S/Sx of angina/ACS. Continue to monitor on telemetry.     HOLD IMDUR with blood loss anemia - resume when appropriate    Acute blood loss anemia  -secondary to epistaxis  -receiving 1unit PRBC this evening  -Check CBC every 6 hours and INR every 12 hours   -given hemostasis - last dose of warfarin on Tuesday.  PharmD consult to assist with management.    >will hold any FFP or vitamin K now - will f/u midnight INR if rising or not downtrending - will reconsider. Reversal vs lowering INR therapeies. 21.    H/O mechanical aortic valve replacement  As per chronic anticoagulation      Type 2 diabetes mellitus with stage 4 chronic kidney disease, without long-term current use of insulin  Trend chemistry panel  -glucose monitoring   -PRN insulin    Chronic anticoagulation  INR goal is 2.0-3.0   -last dose warfarin Tuesday  -q12 INR checks while supratherapeutic  -pharmD consult        VTE Risk Mitigation (From admission, onward)           Ordered     Reason for No Pharmacological VTE Prophylaxis  Once        Comments: Supratherapeutic inr   Question:  Reasons:  Answer:  Physician Provided (leave comment)    06/12/24 2221     IP VTE HIGH RISK PATIENT  Once         06/12/24 2221     Place sequential compression device  Until discontinued         06/12/24 2221                    Discharge Planning   ASTER:      Code Status: Full Code   Is the patient medically ready for discharge?:     Reason for patient still in hospital (select all that apply): Patient trending " condition                     Aga Hernandez MD  Department of Hospital Medicine   [facility] - Emergency Dept

## 2024-06-13 NOTE — CARE UPDATE
CXR with small right effusion    BNP is 800s    Will order one time dose IV lasix in AM - as pt received PRBC    Hgb is up to 7.5   INR downtrending 3.3

## 2024-06-13 NOTE — ASSESSMENT & PLAN NOTE
Patient with known CAD s/p CABG, which is controlled Will continue  beta blocker, ASA, and Statin and monitor for S/Sx of angina/ACS. Continue to monitor on telemetry.     HOLD IMDUR with blood loss anemia - resume when appropriate

## 2024-06-13 NOTE — TELEPHONE ENCOUNTER
----- Message from Eve Mercedes sent at 6/13/2024  8:46 AM CDT -----  Contact: [facility]/ Juli/[phone]  Type: Home Health (orders, updates, clarifications, etc.)    Home Health Agency/Nurse:[facility]/ [place]    Phone number:[phone]    Reason for call:Notification     Comments: Juli said that she is calling in regards to they were going to admit pt into HH today , but pt's wife stated that he is at an [facility] due to a nose bleed that they could not stop. Please advise

## 2024-06-13 NOTE — CONSULTS
Hospital Medicine Pharmacy Consult Note    Pharmacy to review dose of warfarin  Dariel Urbina is a 83 y.o. male on warfarin therapy for AF and mechanical aortic valve. PharmD has been consulted for warfarin dosing.    Current order: holding  Home dose: warfarin 5 mg daily  Coumadin clinic enrollment: Active  INR goal: 2-3    Lab Results   Component Value Date    INR 6.2 (HH) 06/13/2024    INR 3.7 (H) 06/12/2024    INR 4.4 (H) 06/12/2024     Significant drug interactions: none  Diet:  Soft and Bite sized with low sodium and Coumadin restriction  without supplements     Recommendation(s):   INR 6.2 with associated epistaxis and hemoglobin drop of 2.0 from this mornings labs  May consider reversal of warfarin at this time given this meets criteria for major bleed, however this patient has a mechanical valve and atrial fibrillation so will be be a high thrombotic risk during reversal  Consider risks vs benefits with worsening bleed vs thrombotic stroke risk  Full reversal would entail vitamin K 10 mg IVPB x1 dose with Kcentra 50 units/kg (max 5000 units)  May consider vitamin K alone for slower reversal and less thrombotic risk (PO for slower reversal compared to IV)  If patient fully stabilizes and bleeding stops may just hold warfarin  Hold warfarin  Daily INR  Pharmacy will continue to follow and monitor warfarin    Thank you for the consult,  Manny Tapia  Extension [phone]    **Note: Consults are reviewed Monday-Friday 7:00am-3:30pm. The above recommendations are only suggested. The recommendations should be considered in conjunction with all patient factors.**

## 2024-06-13 NOTE — SUBJECTIVE & OBJECTIVE
Medications:  Continuous Infusions:  Scheduled Meds:   allopurinoL  100 mg Oral Daily    atorvastatin  80 mg Oral QHS    [START ON 6/14/2024] bumetanide  2 mg Oral Every other day    ferrous gluconate  324 mg Oral Daily with breakfast    metoprolol succinate  25 mg Oral Daily    sodium chloride  2 spray Each Nostril TID     PRN Meds:  Current Facility-Administered Medications:     0.9%  NaCl infusion (for blood administration), , Intravenous, Q24H PRN    acetaminophen, 650 mg, Oral, Q4H PRN    aluminum-magnesium hydroxide-simethicone, 30 mL, Oral, QID PRN    dextrose 10%, 12.5 g, Intravenous, PRN    dextrose 10%, 25 g, Intravenous, PRN    glucagon (human recombinant), 1 mg, Intramuscular, PRN    glucose, 16 g, Oral, PRN    glucose, 24 g, Oral, PRN    insulin aspart U-100, 0-5 Units, Subcutaneous, QID (AC + HS) PRN    naloxone, 0.02 mg, Intravenous, PRN    ondansetron, 4 mg, Intravenous, Q8H PRN    polyethylene glycol, 17 g, Oral, BID PRN    prochlorperazine, 5 mg, Intravenous, Q6H PRN    senna-docusate 8.6-50 mg, 1 tablet, Oral, Daily PRN    sodium chloride 0.9%, 10 mL, Intravenous, Q6H PRN    traZODone, 50 mg, Oral, Nightly PRN     Current Facility-Administered Medications on File Prior to Encounter   Medication    ceFAZolin 2 g in dextrose 5 % in water (D5W) 50 mL IVPB (MB+)    HYDROmorphone injection 0.2 mg    sodium chloride 0.9% flush 10 mL     Current Outpatient Medications on File Prior to Encounter   Medication Sig    acetaminophen (TYLENOL) 500 MG tablet Take 500 mg by mouth daily as needed for Pain.    allopurinoL (ZYLOPRIM) 100 MG tablet Take 1 tablet (100 mg total) by mouth once daily.    amLODIPine (NORVASC) 5 MG tablet Take 1 tablet (5 mg total) by mouth once daily.    bumetanide (BUMEX) 1 MG tablet Take 2 tablets (2 mg total) by mouth every other day.    ferrous gluconate (FERGON) 324 MG tablet TAKE 1 TABLET EVERY DAY WITH BREAKFAST    isosorbide mononitrate (IMDUR) 60 MG 24 hr tablet TAKE 1 TABLET  EVERY EVENING    metoprolol succinate (TOPROL-XL) 25 MG 24 hr tablet Take 1 tablet (25 mg total) by mouth once daily.    rosuvastatin (CRESTOR) 40 MG Tab TAKE 1 TABLET EVERY EVENING.    sodium chloride (SALINE NASAL) 0.65 % nasal spray 2 sprays by Nasal route 3 (three) times daily.    traZODone (DESYREL) 50 MG tablet Take 1 tablet (50 mg total) by mouth every evening.    warfarin (COUMADIN) 5 MG tablet TAKE 1/2 TABLET (2.5MG) SATURDAY, THEN TAKE 1 TABLET (5MG) ALL OTHER DAYS OR AS INSTRUCTED BY COUMADIN CLINIC    omega-3 fatty acids/fish oil (FISH OIL-OMEGA-3 FATTY ACIDS) 300-1,000 mg capsule Take 1 capsule by mouth once daily.       Review of patient's allergies indicates:   Allergen Reactions    Lisinopril     Losartan      Intolerance- elevates potassium level       Past Medical History:   Diagnosis Date    Anemia of chronic renal failure, stage 3 (moderate) 05/27/2015    Anticoagulant long-term use     Atherosclerosis of coronary artery bypass graft of native heart without angina pectoris 09/11/2012    3-27-18 [facility] Two vessel coronary artery disease.   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    Bilateral carotid artery disease 02/09/2017    Bleeding from the nose     Bleeding nose 03/21/2018    Cancer     Cataract     CHF (congestive heart failure)     CKD (chronic kidney disease) stage 3, GFR 30-59 ml/min 05/27/2015    Claudication of left lower extremity 09/17/2014    Colon polyp     Encounter for blood transfusion     Gastroesophageal reflux disease without esophagitis 03/19/2018    Gastrointestinal hemorrhage associated with intestinal diverticulosis 04/01/2018    Glaucoma     H/O mechanical aortic valve replacement 09/17/2014    History of gout 09/26/2012    Hyperparathyroidism due to renal insufficiency 07/27/2015    Internal hemorrhoid 04/03/2018    Long term current use of anticoagulant therapy 09/26/2012    Mechanical heart valve present     Metabolic acidosis with normal anion  gap and bicarbonate losses 03/20/2018    Mixed hyperlipidemia 09/26/2012    NSTEMI (non-ST elevated myocardial infarction) 03/21/2018    Obesity, diabetes, and hypertension syndrome 02/23/2016    Paroxysmal atrial fibrillation 02/06/2023    PVD (peripheral vascular disease) 09/11/2012    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     Past Surgical History:   Procedure Laterality Date    BACK SURGERY      CARDIAC CATHETERIZATION      CARDIAC VALVE REPLACEMENT      CARDIAC VALVE SURGERY      CARPAL TUNNEL RELEASE Right 05/19/2020    Procedure: RELEASE, CARPAL TUNNEL;  Surgeon: Rupesh Norris Jr., MD;  Location: [facility];  Service: Plastics;  Laterality: Right;    CATARACT EXTRACTION Left 11/13/2022        COLON SURGERY      COLONOSCOPY N/A 03/31/2017    Procedure: COLONOSCOPY;  Surgeon: Bruno Raymond MD;  Location: [facility] (4TH FLR);  Service: Endoscopy;  Laterality: N/A;  Patient's wife requesting date.    COLONOSCOPY N/A 04/03/2018    Procedure: COLONOSCOPY;  Surgeon: Bonifacio Pelletier MD;  Location: [facility] (2ND FLR);  Service: Endoscopy;  Laterality: N/A;    COLONOSCOPY N/A 08/13/2018    Procedure: COLONOSCOPY;  Surgeon: Kam Barba MD;  Location: [facility] (2ND FLR);  Service: Endoscopy;  Laterality: N/A;  2nd floor: PA pressure 49; hx of moderate-severe valve disease     per Coumadin clinic-Patient can hold 5 days with lovenox bridge       ok to schedule per Katarina    CORONARY ANGIOGRAPHY N/A 10/04/2021    Procedure: Left heart cath +/- peripheral angiogram;  Surgeon: Jose Ruiz MD;  Location: [facility] CATH LAB;  Service: Cardiology;  Laterality: N/A;    CORONARY  ANGIOGRAPHY N/A 3/2/2023    Procedure: Angiogram, Coronary;  Surgeon: Devon Garnica MD;  Location: [facility] CATH LAB;  Service: Cardiology;  Laterality: N/A;    CORONARY ANGIOPLASTY      CORONARY ARTERY BYPASS GRAFT      CORONARY BYPASS GRAFT ANGIOGRAPHY  10/04/2021    Procedure: Bypass graft study;  Surgeon: Jose Ruiz MD;  Location: [facility] CATH LAB;  Service: Cardiology;;    CORONARY BYPASS GRAFT ANGIOGRAPHY  3/2/2023    Procedure: Bypass graft study;  Surgeon: Devon Garnica MD;  Location: [facility] CATH LAB;  Service: Cardiology;;    ECHOCARDIOGRAM,TRANSESOPHAGEAL N/A 4/14/2023    Procedure: Transesophageal echo (KIRSTEN) intra-procedure log documentation;  Surgeon: [facility] Diagnostic Provider;  Location: [facility] EP LAB;  Service: Cardiology;  Laterality: N/A;    ENDOSCOPIC NASAL CAUTERIZATION Bilateral 11/3/2023    Procedure: CAUTERIZATION, NOSE, ENDOSCOPIC;  Surgeon: Jono Russell MD;  Location: [facility] OR 2ND FLR;  Service: ENT;  Laterality: Bilateral;    ENDOSCOPIC NASAL CAUTERIZATION N/A 12/18/2023    Procedure: CAUTERIZATION, NOSE, ENDOSCOPIC;  Surgeon: Jono Russell MD;  Location: [facility] OR 2ND FLR;  Service: ENT;  Laterality: N/A;    EYE SURGERY      FESS, WITH IMAGING GUIDANCE Left 2/28/2024    Procedure: FESS, WITH IMAGING GUIDANCE;  Surgeon: Jono Russell MD;  Location: [facility] OR 2ND FLR;  Service: ENT;  Laterality: Left;  Scan loaded ENT [employer]tr[employer]. CL    FUNCTIONAL ENDOSCOPIC SINUS SURGERY (FESS) Bilateral 6/14/2023    Procedure: FESS (FUNCTIONAL ENDOSCOPIC SINUS SURGERY);  Surgeon: Jono Russell MD;  Location: [facility] OR 2ND FLR;  Service: ENT;  Laterality: Bilateral;    HERNIA REPAIR      INTRAOCULAR PROSTHESES INSERTION Left 11/13/2022    Procedure: INSERTION, IOL PROSTHESIS;  Surgeon: Alia Mckeon MD;  Location: [facility] OR 1ST FLR;  Service: Ophthalmology;  Laterality: Left;    INTRAOCULAR PROSTHESES INSERTION Right 12/04/2022    Procedure: INSERTION, IOL PROSTHESIS;  Surgeon: Alia Mckeon MD;   Location: [facility] OR 1ST FLR;  Service: Ophthalmology;  Laterality: Right;    LIGATION, ARTERY, ETHMOIDAL Left 2024    Procedure: LIGATION, ARTERY, ETHMOIDAL;  Surgeon: Jono Russell MD;  Location: [facility] OR 2ND FLR;  Service: ENT;  Laterality: Left;    LIGATION, ARTERY, SPHENOPALATINE, ENDOSCOPIC Bilateral 2023    Procedure: LIGATION, ARTERY, SPHENOPALATINE, ENDOSCOPIC;  Surgeon: Jono Russell MD;  Location: [facility] OR 2ND FLR;  Service: ENT;  Laterality: Bilateral;    PHACOEMULSIFICATION OF CATARACT Left 2022    Procedure: PHACOEMULSIFICATION, CATARACT;  Surgeon: Alia Mckeon MD;  Location: [facility] OR 1ST FLR;  Service: Ophthalmology;  Laterality: Left;    PHACOEMULSIFICATION OF CATARACT Right 2022    Procedure: PHACOEMULSIFICATION, CATARACT;  Surgeon: Alia Mckeon MD;  Location: [facility] OR [address] [place] [address];  Service: Ophthalmology;  Laterality: Right;    SPINE SURGERY      TRANSESOPHAGEAL ECHOCARDIOGRAPHY N/A 3/22/2023    Procedure: ECHOCARDIOGRAM, TRANSESOPHAGEAL;  Surgeon: Thuan Diagnostic Provider;  Location: [facility] EP LAB;  Service: Anesthesiology;  Laterality: N/A;    TRANSESOPHAGEAL ECHOCARDIOGRAPHY N/A 2023    Procedure: ECHOCARDIOGRAM, TRANSESOPHAGEAL;  Surgeon: Dos Diagnostic Provider;  Location: [facility] EP LAB;  Service: Anesthesiology;  Laterality: N/A;    VASECTOMY       Family History       Problem Relation (Age of Onset)    Alcohol abuse Father    Diabetes Brother    Heart disease Mother, Father, Brother, Sister    Heart failure Mother, Father, Brother    No Known Problems Sister, Maternal Grandmother, Maternal Grandfather, Paternal Grandmother, Paternal Grandfather, Maternal Aunt, Maternal Uncle, Paternal Aunt, Paternal Uncle          Tobacco Use    Smoking status: Former     Current packs/day: 0.00     Average packs/day: 1 pack/day for 20.0 years (20.0 ttl pk-yrs)     Types: Cigarettes     Start date: 1960     Quit date: 1980     Years since quittin.7     Passive  exposure: Never    Smokeless tobacco: Never   Substance and Sexual Activity    Alcohol use: No    Drug use: No    Sexual activity: Not Currently     Partners: Female     Review of Systems   All other systems reviewed and are negative.    Objective:     Vital Signs (Most Recent):  Temp: 97.5 °F (36.4 °C) (06/13/24 0645)  Pulse: 63 (06/13/24 0849)  Resp: 17 (06/13/24 0645)  BP: 124/72 (06/13/24 0849)  SpO2: 100 % (06/13/24 0849) Vital Signs (24h Range):  Temp:  [97.4 °F (36.3 °C)-98.3 °F (36.8 °C)] 97.5 °F (36.4 °C)  Pulse:  [51-72] 63  Resp:  [14-24] 17  SpO2:  [96 %-100 %] 100 %  BP: (118-150)/(57-72) 124/72     Weight: 69.9 kg (154 lb)  Body mass index is 25.63 kg/m².         Physical Exam  Constitutional:       General: He is not in acute distress.  HENT:      Nose: No congestion.      Comments: Rhinorocket to L nared, hemostatic  R side without evidnece of bleeding     Mouth/Throat:      Mouth: Mucous membranes are dry.      Pharynx: Oropharynx is clear.      Comments: No blood in oropharynx  Eyes:      Extraocular Movements: Extraocular movements intact.   Pulmonary:      Effort: No respiratory distress.      Breath sounds: No stridor.   Neurological:      Mental Status: He is alert.          Significant Labs:  CBC:   Recent Labs   Lab 06/13/24  0540   WBC 5.44   RBC 2.56*   HGB 7.4*   HCT 23.2*      MCV 91   MCH 28.9   MCHC 31.9*       Significant Diagnostics:  None

## 2024-06-13 NOTE — ASSESSMENT & PLAN NOTE
INR goal is 2.0-3.0   -last dose warfarin Tuesday  -q12 INR checks while supratherapeutic  -pharmD consult

## 2024-06-13 NOTE — HPI
83M well known to ENT service for refractory epistaxis, on coumadin. He previously undergone bilateral SP ligation followed by bilateral embolization, cautery on the left septum for persistent bleeding on 11/03/2023 and again recently on 12/18/23. He recently underwent left AEA ligation on 2/28/24. Most recently seen in clinic 5/21 and underwent L sided cautery. Came to ED yesterday w refractory epistaxis on L side, ED placed rhinorocket. Has remained hemostatic since. Required transfusion x 1. INR continues to be supratherapeutic.

## 2024-06-13 NOTE — SUBJECTIVE & OBJECTIVE
Interval History:   : Hg drop, INR elevated, nasal rocket in place    Review of Systems   Respiratory:  Negative for chest tightness and shortness of breath.    Cardiovascular:  Negative for chest pain.   Gastrointestinal: Negative.    Genitourinary: Negative.    Psychiatric/Behavioral:  Negative for confusion.      Objective:     Vital Signs (Most Recent):  Temp: 98 °F (36.7 °C) (24 1536)  Pulse: (!) 57 (24 153)  Resp: 16 (24 153)  BP: 132/70 (24 153)  SpO2: 99 % (24 153) Vital Signs (24h Range):  Temp:  [97.4 °F (36.3 °C)-98.6 °F (37 °C)] 98 °F (36.7 °C)  Pulse:  [49-72] 57  Resp:  [14-24] 16  SpO2:  [98 %-100 %] 99 %  BP: (117-150)/(60-80) 132/70     Weight: 69.9 kg (154 lb)  Body mass index is 25.63 kg/m².  No intake or output data in the 24 hours ending 24 153      Physical Exam  Vitals and nursing note reviewed.   Constitutional:       General: He is not in acute distress.     Appearance: He is not diaphoretic.   HENT:      Head: Normocephalic and atraumatic.      Nose:      Comments: Left nare rhino-rocket  Eyes:      General: No scleral icterus.  Cardiovascular:      Rate and Rhythm: Regular rhythm. Bradycardia present.      Comments: Mechanical click  Pulmonary:      Effort: Pulmonary effort is normal. No respiratory distress.      Breath sounds: No wheezing or rales.   Abdominal:      General: Bowel sounds are normal.      Palpations: Abdomen is soft.   Musculoskeletal:      Right lower leg: No edema.      Left lower le+ Pitting Edema present.   Skin:     General: Skin is warm and dry.   Neurological:      General: No focal deficit present.      Mental Status: He is alert and oriented to person, place, and time.             Significant Labs: All pertinent labs within the past 24 hours have been reviewed.    Significant Imaging: I have reviewed all pertinent imaging results/findings within the past 24 hours.

## 2024-06-13 NOTE — H&P
[facility] Emergency Dept  [facility]  History & Physical    Patient Name: Dariel Urbina  MRN: [id number]  Patient Class: OP- Observation  Admission Date: 6/12/2024  Attending Physician: Aga Hernandez MD [facility] [place] [facility]  Admitting Physician: Jonathan Rawls MD  Primary Care Provider: Devon Langston Jr., MD    Patient information was obtained from patient, past medical records, and ER records.     Subjective:     Principal Problem:Epistaxis    Chief Complaint:   Chief Complaint   Patient presents with    Epistaxis     Nosebleed since 4am; has nasal clamp in place, on Warfarin. Has had to have blood transfusions        HPI: 82 y/o WM CAD s/p CABG, MR s/p mitraclib, Afib, Type 2 DM, CKD4,  s/p mechanical aortic valve placement on warfarin anticoagulation w/ hx of recurrent epistaxis presents to the ED for epistaxis.  He reports painless bleeding from left nare onset @ 0400 today.  He did not want to come to the hospital, but wife advised him to.  He is found with supratherapeutic INR 4.4.  His last dose of warfarin was yesterday.      ENT has placed rhino rocket to left nare. Consult note pending.  Patient reports its always left nare that has recurrent bleeding. He has Hemoglobin 6.6 and 1unit PRBC ordered in ED and infusing during my interview.      Pt has no complaints of chest pain/dyspnea, no nausea/vomiting, no hematemesis, hematuria, or other sites of bruising or bleeding.     [facility] was [facility] [place] [facility] same reason (5/29-6/3), INR was >8 last admission. Required PRBC transfusion.  Hospital course was complicated by JIM on CKD and acute on chronic heart failure exacerbation.     Past Medical History:   Diagnosis Date    Anemia of chronic renal failure, stage 3 (moderate) 05/27/2015    Anticoagulant long-term use     Atherosclerosis of coronary artery bypass graft of native heart without angina pectoris 09/11/2012    3-27-18 [facility] Two vessel coronary artery disease.   Prosthetic aortic  valve.   Porcelain aorta.   Patent LIMA graft.    Bilateral carotid artery disease 02/09/2017    Bleeding from the nose     Bleeding nose 03/21/2018    Cancer     Cataract     CHF (congestive heart failure)     CKD (chronic kidney disease) stage 3, GFR 30-59 ml/min 05/27/2015    Claudication of left lower extremity 09/17/2014    Colon polyp     Encounter for blood transfusion     Gastroesophageal reflux disease without esophagitis 03/19/2018    Gastrointestinal hemorrhage associated with intestinal diverticulosis 04/01/2018    Glaucoma     H/O mechanical aortic valve replacement 09/17/2014    History of gout 09/26/2012    Hyperparathyroidism due to renal insufficiency 07/27/2015    Internal hemorrhoid 04/03/2018    Long term current use of anticoagulant therapy 09/26/2012    Mechanical heart valve present     Metabolic acidosis with normal anion gap and bicarbonate losses 03/20/2018    Mixed hyperlipidemia 09/26/2012    NSTEMI (non-ST elevated myocardial infarction) 03/21/2018    Obesity, diabetes, and hypertension syndrome 02/23/2016    Paroxysmal atrial fibrillation 02/06/2023    PVD (peripheral vascular disease) 09/11/2012    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       Past Surgical History:   Procedure Laterality Date    BACK SURGERY      CARDIAC CATHETERIZATION      CARDIAC VALVE REPLACEMENT      CARDIAC VALVE SURGERY      CARPAL TUNNEL RELEASE Right 05/19/2020    Procedure: RELEASE, CARPAL TUNNEL;  Surgeon: Rupesh Norris Jr., MD;  Location: [facility];  Service: Plastics;  Laterality: Right;    CATARACT EXTRACTION Left 11/13/2022        COLON SURGERY      COLONOSCOPY N/A  03/31/2017    Procedure: COLONOSCOPY;  Surgeon: Bruno Raymond MD;  Location: [facility] ENDO (4TH FLR);  Service: Endoscopy;  Laterality: N/A;  Patient's wife requesting date.    COLONOSCOPY N/A 04/03/2018    Procedure: COLONOSCOPY;  Surgeon: Bonifacio Pelletier MD;  Location: [facility] ENDO (2ND FLR);  Service: Endoscopy;  Laterality: N/A;    COLONOSCOPY N/A 08/13/2018    Procedure: COLONOSCOPY;  Surgeon: Kam Barba MD;  Location: [facility] ENDO (2ND FLR);  Service: Endoscopy;  Laterality: N/A;  2nd floor: PA pressure 49; hx of moderate-severe valve disease     per Coumadin clinic-Patient can hold 5 days with lovenox bridge       ok to schedule per Katarina    CORONARY ANGIOGRAPHY N/A 10/04/2021    Procedure: Left heart cath +/- peripheral angiogram;  Surgeon: Jose Ruiz MD;  Location: [facility] CATH LAB;  Service: Cardiology;  Laterality: N/A;    CORONARY ANGIOGRAPHY N/A 3/2/2023    Procedure: Angiogram, Coronary;  Surgeon: Devon Garnica MD;  Location: [facility] CATH LAB;  Service: Cardiology;  Laterality: N/A;    CORONARY ANGIOPLASTY      CORONARY ARTERY BYPASS GRAFT      CORONARY BYPASS GRAFT ANGIOGRAPHY  10/04/2021    Procedure: Bypass graft study;  Surgeon: Jose Ruiz MD;  Location: [facility] CATH LAB;  Service: Cardiology;;    CORONARY BYPASS GRAFT ANGIOGRAPHY  3/2/2023    Procedure: Bypass graft study;  Surgeon: Devon Garnica MD;  Location: [facility] CATH LAB;  Service: Cardiology;;    ECHOCARDIOGRAM,TRANSESOPHAGEAL N/A 4/14/2023    Procedure: Transesophageal echo (KIRSTEN) intra-procedure log documentation;  Surgeon: [facility] Diagnostic Provider;  Location: [facility] EP LAB;  Service: Cardiology;  Laterality: N/A;    ENDOSCOPIC NASAL CAUTERIZATION Bilateral 11/3/2023    Procedure: CAUTERIZATION, NOSE, ENDOSCOPIC;  Surgeon: Jono Russell MD;  Location: [facility] OR 2ND FLR;  Service: ENT;  Laterality: Bilateral;    ENDOSCOPIC NASAL CAUTERIZATION N/A 12/18/2023    Procedure: CAUTERIZATION, NOSE, ENDOSCOPIC;  Surgeon: Jono Russell MD;   Location: [facility] OR 2ND FLR;  Service: ENT;  Laterality: N/A;    EYE SURGERY      FESS, WITH IMAGING GUIDANCE Left 2/28/2024    Procedure: FESS, WITH IMAGING GUIDANCE;  Surgeon: Jono Russell MD;  Location: [facility] OR 2ND FLR;  Service: ENT;  Laterality: Left;  Scan loaded ENT [employer]. CL    FUNCTIONAL ENDOSCOPIC SINUS SURGERY (FESS) Bilateral 6/14/2023    Procedure: FESS (FUNCTIONAL ENDOSCOPIC SINUS SURGERY);  Surgeon: Jono Russell MD;  Location: [facility] OR 2ND FLR;  Service: ENT;  Laterality: Bilateral;    HERNIA REPAIR      INTRAOCULAR PROSTHESES INSERTION Left 11/13/2022    Procedure: INSERTION, IOL PROSTHESIS;  Surgeon: Alia Mckeon MD;  Location: [facility] OR 1ST FLR;  Service: Ophthalmology;  Laterality: Left;    INTRAOCULAR PROSTHESES INSERTION Right 12/04/2022    Procedure: INSERTION, IOL PROSTHESIS;  Surgeon: Alia Mckeon MD;  Location: [facility] OR 1ST FLR;  Service: Ophthalmology;  Laterality: Right;    LIGATION, ARTERY, ETHMOIDAL Left 2/28/2024    Procedure: LIGATION, ARTERY, ETHMOIDAL;  Surgeon: Jono Russell MD;  Location: [facility] OR 2ND FLR;  Service: ENT;  Laterality: Left;    LIGATION, ARTERY, SPHENOPALATINE, ENDOSCOPIC Bilateral 6/14/2023    Procedure: LIGATION, ARTERY, SPHENOPALATINE, ENDOSCOPIC;  Surgeon: Jono Russell MD;  Location: [facility] OR 2ND FLR;  Service: ENT;  Laterality: Bilateral;    PHACOEMULSIFICATION OF CATARACT Left 11/13/2022    Procedure: PHACOEMULSIFICATION, CATARACT;  Surgeon: Alia Mckeon MD;  Location: [facility] OR 1ST FLR;  Service: Ophthalmology;  Laterality: Left;    PHACOEMULSIFICATION OF CATARACT Right 12/04/2022    Procedure: PHACOEMULSIFICATION, CATARACT;  Surgeon: Alia Mckeon MD;  Location: [facility] OR 1ST FLR;  Service: Ophthalmology;  Laterality: Right;    SPINE SURGERY      TRANSESOPHAGEAL ECHOCARDIOGRAPHY N/A 3/22/2023    Procedure: ECHOCARDIOGRAM, TRANSESOPHAGEAL;  Surgeon: Zenia Diagnostic Provider;  Location: [facility] EP LAB;  Service: Anesthesiology;   Laterality: N/A;    TRANSESOPHAGEAL ECHOCARDIOGRAPHY N/A 4/11/2023    Procedure: ECHOCARDIOGRAM, TRANSESOPHAGEAL;  Surgeon: Zenia Diagnostic Provider;  Location: [facility] EP LAB;  Service: Anesthesiology;  Laterality: N/A;    VASECTOMY         Review of patient's allergies indicates:   Allergen Reactions    Lisinopril     Losartan      Intolerance- elevates potassium level       Current Facility-Administered Medications on File Prior to Encounter   Medication    ceFAZolin 2 g in dextrose 5 % in water (D5W) 50 mL IVPB (MB+)    HYDROmorphone injection 0.2 mg    sodium chloride 0.9% flush 10 mL     Current Outpatient Medications on File Prior to Encounter   Medication Sig    acetaminophen (TYLENOL) 500 MG tablet Take 500 mg by mouth daily as needed for Pain.    allopurinoL (ZYLOPRIM) 100 MG tablet Take 1 tablet (100 mg total) by mouth once daily.    amLODIPine (NORVASC) 5 MG tablet Take 1 tablet (5 mg total) by mouth once daily.    bumetanide (BUMEX) 1 MG tablet Take 2 tablets (2 mg total) by mouth every other day.    ferrous gluconate (FERGON) 324 MG tablet TAKE 1 TABLET EVERY DAY WITH BREAKFAST    isosorbide mononitrate (IMDUR) 60 MG 24 hr tablet TAKE 1 TABLET EVERY EVENING    metoprolol succinate (TOPROL-XL) 25 MG 24 hr tablet Take 1 tablet (25 mg total) by mouth once daily.    omega-3 fatty acids/fish oil (FISH OIL-OMEGA-3 FATTY ACIDS) 300-1,000 mg capsule Take 1 capsule by mouth once daily.    rosuvastatin (CRESTOR) 40 MG Tab TAKE 1 TABLET EVERY EVENING.    sodium chloride (SALINE NASAL) 0.65 % nasal spray 2 sprays by Nasal route 3 (three) times daily.    traZODone (DESYREL) 50 MG tablet Take 1 tablet (50 mg total) by mouth every evening.    warfarin (COUMADIN) 5 MG tablet TAKE 1/2 TABLET (2.5MG) SATURDAY, THEN TAKE 1 TABLET (5MG) ALL OTHER DAYS OR AS INSTRUCTED BY COUMADIN CLINIC     Family History       Problem Relation (Age of Onset)    Alcohol abuse Father    Diabetes Brother    Heart disease Mother, Father,  Brother, Sister    Heart failure Mother, Father, Brother    No Known Problems Sister, Maternal Grandmother, Maternal Grandfather, Paternal Grandmother, Paternal Grandfather, Maternal Aunt, Maternal Uncle, Paternal Aunt, Paternal Uncle          Tobacco Use    Smoking status: Former     Current packs/day: 0.00     Average packs/day: 1 pack/day for 20.0 years (20.0 ttl pk-yrs)     Types: Cigarettes     Start date: 1960     Quit date: 1980     Years since quittin.7     Passive exposure: Never    Smokeless tobacco: Never   Substance and Sexual Activity    Alcohol use: No    Drug use: No    Sexual activity: Not Currently     Partners: Female     Review of Systems   Respiratory:  Negative for chest tightness and shortness of breath.    Cardiovascular:  Negative for chest pain.   Gastrointestinal: Negative.    Genitourinary: Negative.    Psychiatric/Behavioral:  Negative for confusion.      Objective:     Vital Signs (Most Recent):  Temp: 98 °F (36.7 °C) (24)  Pulse: (!) 53 (24)  Resp: (!) 22 (24)  BP: (!) 150/63 (24)  SpO2: 99 % (24) Vital Signs (24h Range):  Temp:  [98 °F (36.7 °C)-98.2 °F (36.8 °C)] 98 °F (36.7 °C)  Pulse:  [53-61] 53  Resp:  [19-24] 22  SpO2:  [96 %-100 %] 99 %  BP: (118-150)/(57-65) 150/63     Weight: 69.9 kg (154 lb)  Body mass index is 25.63 kg/m².     Physical Exam  Vitals and nursing note reviewed.   Constitutional:       General: He is not in acute distress.     Appearance: He is not diaphoretic.   HENT:      Head: Normocephalic and atraumatic.      Nose:      Comments: Left nare rhino-rocket  Eyes:      General: No scleral icterus.  Cardiovascular:      Rate and Rhythm: Regular rhythm. Bradycardia present.      Comments: Mechanical click  Pulmonary:      Effort: Pulmonary effort is normal. No respiratory distress.      Breath sounds: No wheezing or rales.   Abdominal:      General: Bowel sounds are normal.      Palpations:  "Abdomen is soft.   Musculoskeletal:      Right lower leg: No edema.      Left lower le+ Pitting Edema present.   Skin:     General: Skin is warm and dry.   Neurological:      General: No focal deficit present.      Mental Status: He is alert and oriented to person, place, and time.                Significant Labs: All pertinent labs within the past 24 hours have been reviewed.  CBC:   Recent Labs   Lab 24  1422 24  1753   WBC 5.64 6.02   HGB 7.1* 6.6*   HCT 22.7* 21.4*    171     CMP:   Recent Labs   Lab 24  1422      K 4.5      CO2 23   *   BUN 73*   CREATININE 2.6*   CALCIUM 10.0   PROT 6.6   ALBUMIN 3.1*   BILITOT 0.4   ALKPHOS 56   AST 16   ALT 15   ANIONGAP 9     Coagulation:   Recent Labs   Lab 24  1422   INR 4.4*       Significant Imaging: I have reviewed all pertinent imaging results/findings within the past 24 hours.      Assessment/Plan:     * Epistaxis  -recurrent left nare bleeding with supratherapeutic INR causing acute blood loss anemia.    -s/p Rhinorocket placement in ED  - w/ afrin spray and tranexamic acid intranasally   -Consult ENT in AM     -per last ENT inpatient consult recs   Avoid use of nasal cannula if any hypoxia develops - use only humidified o2 via face mask/face tent  For recurrent bleeding - given presence of rhino rocket - would contact ENT on-call  Prior ENT notes have mentioned possible need for angiography and embolization with IR.       Prior history of ENT procedures "bilateral SP ligation followed by bilateral embolization, cautery on the left septum for persistent bleeding on 2023 and again recently on 23. He recently underwent left AEA ligation on 24. Most recently seen in clinic  and underwent L sided cautery."    Coronary artery disease involving native coronary artery of native heart without angina pectoris  Patient with known CAD s/p CABG, which is controlled Will continue  beta blocker, ASA, and " Statin and monitor for S/Sx of angina/ACS. Continue to monitor on telemetry.     HOLD IMDUR with blood loss anemia - resume when appropriate    Acute blood loss anemia  -secondary to epistaxis  -receiving 1unit PRBC this evening  -Check CBC every 6 hours and INR every 12 hours   -given hemostasis - last dose of warfarin on Tuesday.  PharmD consult to assist with management.    >will hold any FFP or vitamin K now - will f/u midnight INR if rising or not downtrending - will reconsider. Reversal vs lowering INR therapeies. 21.    Chronic anticoagulation  INR goal is 2.0-3.0   -last dose warfarin Tuesday  -q12 INR checks while supratherapeutic  -pharmD consult      H/O mechanical aortic valve replacement  As per chronic anticoagulation      Type 2 diabetes mellitus with stage 4 chronic kidney disease, without long-term current use of insulin  Trend chemistry panel  -glucose monitoring   -PRN insulin      VTE Risk Mitigation (From admission, onward)           Ordered     Reason for No Pharmacological VTE Prophylaxis  Once        Comments: Supratherapeutic inr   Question:  Reasons:  Answer:  Physician Provided (leave comment)    06/12/24 2221     IP VTE HIGH RISK PATIENT  Once         06/12/24 2221     Place sequential compression device  Until discontinued         06/12/24 2221                       On 06/12/2024, patient should be placed in hospital observation services under my care.             Jonathan Rawls MD  Department of Hospital Medicine  [facility] - Emergency Dept

## 2024-06-13 NOTE — HPI
84 y/o WM CAD s/p CABG, MR s/p mitraclib, Afib, Type 2 DM, CKD4,  s/p mechanical aortic valve placement on warfarin anticoagulation w/ hx of recurrent epistaxis presents to the ED for epistaxis.  He reports painless bleeding from left nare onset @ 0400 today.  He did not want to come to the hospital, but wife advised him to.  He is found with supratherapeutic INR 4.4.  His last dose of warfarin was yesterday.      ENT has placed rhino rocket to left nare. Consult note pending.  Patient reports its always left nare that has recurrent bleeding. He has Hemoglobin 6.6 and 1unit PRBC ordered in ED and infusing during my interview.      Pt has no complaints of chest pain/dyspnea, no nausea/vomiting, no hematemesis, hematuria, or other sites of bruising or bleeding.     He was recently admitted to [facility] for same reason (5/29-6/3), INR was >8 last admission. Required PRBC transfusion.  Hospital course was complicated by JIM on CKD and acute on chronic heart failure exacerbation.

## 2024-06-13 NOTE — PHARMACY MED REC
"Admission Medication History     The home medication history was taken by [facility].    You may go to "Admission" then "Reconcile Home Medications" tabs to review and/or act upon these items.     The home medication list has been updated by the Pharmacy department.   Please read ALL comments highlighted in yellow.   Please address this information as you see fit.    Feel free to contact us if you have any questions or require assistance.      Medications listed below were obtained from: Ameena - spouse and [employer]- [employer]  Current Outpatient Medications on File Prior to Encounter   Medication Sig    acetaminophen (TYLENOL) 500 MG tablet   Take 500 mg by mouth daily as needed for Pain.    allopurinoL (ZYLOPRIM) 100 MG tablet   Take 1 tablet (100 mg total) by mouth once daily.    amLODIPine (NORVASC) 5 MG tablet   Take 1 tablet (5 mg total) by mouth once daily.    bumetanide (BUMEX) 1 MG tablet   Take 2 tablets (2 mg total) by mouth every other day.    ferrous gluconate (FERGON) 324 MG tablet   Take 1 tablet by mouth every day with breakfast.    isosorbide mononitrate (IMDUR) 60 MG 24 hr tablet   Take 1 tablet by mouth every evening.    metoprolol succinate (TOPROL-XL) 25 MG 24 hr tablet   Take 1 tablet (25 mg total) by mouth once daily.    rosuvastatin (CRESTOR) 40 MG Tab   Take 1 tablet by mouth every evening.    sodium chloride (SALINE NASAL) 0.65 % nasal spray   2 sprays by Nasal route 3 (three) times daily.    traZODone (DESYREL) 50 MG tablet   Take 1 tablet (50 mg total) by mouth nightly as needed for Insomnia.    warfarin (COUMADIN) 5 MG tablet   Take 5 mg by mouth every evening.    omega-3 fatty acids/fish oil (FISH OIL-OMEGA-3 FATTY ACIDS) 300-1,000 mg capsule Take 1 capsule by mouth once daily.                 Selvin Live  EXT [id number]                  .          "

## 2024-06-13 NOTE — ED NOTES
I assumed care of this patient at this time. Report received from RADHA Jean. Pt is resting comfortably in ED stretcher. Pt is AAOx4. RR is even, unlabored, and spontaneous + pt's oxygen saturation is 100% at this time. Rhino-rocket in place to left nare + bleeding controlled at this time. Skin is warm, dry and intact. Pt ambulatory + full ROM of all extremities + no over deficits noted. Pt independent + able to void spontaneously. Pt denies pain or needs at this time. Denies chest pain, shortness of breath, nausea, vomiting. Bed low and locked; side rails up x2; call light within reach.

## 2024-06-13 NOTE — TELEPHONE ENCOUNTER
Ok-- well if he is in the ed with another nosebleed, I would reason they are not able to admit him to home health.  Maybe leave and arrange to come back when he is not in the the hospital??

## 2024-06-13 NOTE — CONSULTS
[facility] - Emergency Dept  Otorhinolaryngology-Head & Neck Surgery  Consult Note    Patient Name: Dariel Urbina  MRN: [id number]  Code Status: Full Code  Admission Date: 6/12/2024  Hospital Length of Stay: 0 days  Attending Physician: Radha Pressley MD  Primary Care Provider: Devon Langston Jr., MD    Patient information was obtained from patient, spouse/SO, past medical records, and ER records.     Inpatient consult to ENT  Consult performed by: Susanna Fuentes MD  Consult ordered by: Jonathan Rawls MD        Subjective:     Chief Complaint/Reason for Admission: Epistaxis    History of Present Illness: 83M well known to ENT service for refractory epistaxis, on coumadin. He previously undergone bilateral SP ligation followed by bilateral embolization, cautery on the left septum for persistent bleeding on 11/03/2023 and again recently on 12/18/23. He recently underwent left AEA ligation on 2/28/24. Most recently seen in clinic 5/21 and underwent L sided cautery. Came to ED yesterday w refractory epistaxis on L side, ED placed rhinorocket. Has remained hemostatic since. Required transfusion x 1. INR continues to be supratherapeutic.     Medications:  Continuous Infusions:  Scheduled Meds:   allopurinoL  100 mg Oral Daily    atorvastatin  80 mg Oral QHS    [START ON 6/14/2024] bumetanide  2 mg Oral Every other day    ferrous gluconate  324 mg Oral Daily with breakfast    metoprolol succinate  25 mg Oral Daily    sodium chloride  2 spray Each Nostril TID     PRN Meds:  Current Facility-Administered Medications:     0.9%  NaCl infusion (for blood administration), , Intravenous, Q24H PRN    acetaminophen, 650 mg, Oral, Q4H PRN    aluminum-magnesium hydroxide-simethicone, 30 mL, Oral, QID PRN    dextrose 10%, 12.5 g, Intravenous, PRN    dextrose 10%, 25 g, Intravenous, PRN    glucagon (human recombinant), 1 mg, Intramuscular, PRN    glucose, 16 g, Oral, PRN    glucose, 24 g, Oral, PRN    insulin aspart  U-100, 0-5 Units, Subcutaneous, QID (AC + HS) PRN    naloxone, 0.02 mg, Intravenous, PRN    ondansetron, 4 mg, Intravenous, Q8H PRN    polyethylene glycol, 17 g, Oral, BID PRN    prochlorperazine, 5 mg, Intravenous, Q6H PRN    senna-docusate 8.6-50 mg, 1 tablet, Oral, Daily PRN    sodium chloride 0.9%, 10 mL, Intravenous, Q6H PRN    traZODone, 50 mg, Oral, Nightly PRN     Current Facility-Administered Medications on File Prior to Encounter   Medication    ceFAZolin 2 g in dextrose 5 % in water (D5W) 50 mL IVPB (MB+)    HYDROmorphone injection 0.2 mg    sodium chloride 0.9% flush 10 mL     Current Outpatient Medications on File Prior to Encounter   Medication Sig    acetaminophen (TYLENOL) 500 MG tablet Take 500 mg by mouth daily as needed for Pain.    allopurinoL (ZYLOPRIM) 100 MG tablet Take 1 tablet (100 mg total) by mouth once daily.    amLODIPine (NORVASC) 5 MG tablet Take 1 tablet (5 mg total) by mouth once daily.    bumetanide (BUMEX) 1 MG tablet Take 2 tablets (2 mg total) by mouth every other day.    ferrous gluconate (FERGON) 324 MG tablet TAKE 1 TABLET EVERY DAY WITH BREAKFAST    isosorbide mononitrate (IMDUR) 60 MG 24 hr tablet TAKE 1 TABLET EVERY EVENING    metoprolol succinate (TOPROL-XL) 25 MG 24 hr tablet Take 1 tablet (25 mg total) by mouth once daily.    rosuvastatin (CRESTOR) 40 MG Tab TAKE 1 TABLET EVERY EVENING.    sodium chloride (SALINE NASAL) 0.65 % nasal spray 2 sprays by Nasal route 3 (three) times daily.    traZODone (DESYREL) 50 MG tablet Take 1 tablet (50 mg total) by mouth every evening.    warfarin (COUMADIN) 5 MG tablet TAKE 1/2 TABLET (2.5MG) SATURDAY, THEN TAKE 1 TABLET (5MG) ALL OTHER DAYS OR AS INSTRUCTED BY COUMADIN CLINIC    omega-3 fatty acids/fish oil (FISH OIL-OMEGA-3 FATTY ACIDS) 300-1,000 mg capsule Take 1 capsule by mouth once daily.       Review of patient's allergies indicates:   Allergen Reactions    Lisinopril     Losartan      Intolerance- elevates potassium level        Past Medical History:   Diagnosis Date    Anemia of chronic renal failure, stage 3 (moderate) 05/27/2015    Anticoagulant long-term use     Atherosclerosis of coronary artery bypass graft of native heart without angina pectoris 09/11/2012    3-27-18 [facility] Two vessel coronary artery disease.   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    Bilateral carotid artery disease 02/09/2017    Bleeding from the nose     Bleeding nose 03/21/2018    Cancer     Cataract     CHF (congestive heart failure)     CKD (chronic kidney disease) stage 3, GFR 30-59 ml/min 05/27/2015    Claudication of left lower extremity 09/17/2014    Colon polyp     Encounter for blood transfusion     Gastroesophageal reflux disease without esophagitis 03/19/2018    Gastrointestinal hemorrhage associated with intestinal diverticulosis 04/01/2018    Glaucoma     H/O mechanical aortic valve replacement 09/17/2014    History of gout 09/26/2012    Hyperparathyroidism due to renal insufficiency 07/27/2015    Internal hemorrhoid 04/03/2018    Long term current use of anticoagulant therapy 09/26/2012    Mechanical heart valve present     Metabolic acidosis with normal anion gap and bicarbonate losses 03/20/2018    Mixed hyperlipidemia 09/26/2012    NSTEMI (non-ST elevated myocardial infarction) 03/21/2018    Obesity, diabetes, and hypertension syndrome 02/23/2016    Paroxysmal atrial fibrillation 02/06/2023    PVD (peripheral vascular disease) 09/11/2012    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     Past Surgical History:   Procedure Laterality Date    BACK SURGERY      CARDIAC CATHETERIZATION      CARDIAC VALVE  REPLACEMENT      CARDIAC VALVE SURGERY      CARPAL TUNNEL RELEASE Right 05/19/2020    Procedure: RELEASE, CARPAL TUNNEL;  Surgeon: Rupesh Norris Jr., MD;  Location: [facility];  Service: Plastics;  Laterality: Right;    CATARACT EXTRACTION Left 11/13/2022        COLON SURGERY      COLONOSCOPY N/A 03/31/2017    Procedure: COLONOSCOPY;  Surgeon: Bruno Raymond MD;  Location: [facility] ENDO (4TH FLR);  Service: Endoscopy;  Laterality: N/A;  Patient's wife requesting date.    COLONOSCOPY N/A 04/03/2018    Procedure: COLONOSCOPY;  Surgeon: Bonifacio Pelletier MD;  Location: [facility] ENDO (2ND FLR);  Service: Endoscopy;  Laterality: N/A;    COLONOSCOPY N/A 08/13/2018    Procedure: COLONOSCOPY;  Surgeon: Kam Barba MD;  Location: [facility] ENDO (2ND FLR);  Service: Endoscopy;  Laterality: N/A;  2nd floor: PA pressure 49; hx of moderate-severe valve disease     per Coumadin clinic-Patient can hold 5 days with lovenox bridge       ok to schedule per Katarina    CORONARY ANGIOGRAPHY N/A 10/04/2021    Procedure: Left heart cath +/- peripheral angiogram;  Surgeon: Jose Ruiz MD;  Location: [facility] CATH LAB;  Service: Cardiology;  Laterality: N/A;    CORONARY ANGIOGRAPHY N/A 3/2/2023    Procedure: Angiogram, Coronary;  Surgeon: Devon Garnica MD;  Location: [facility] CATH LAB;  Service: Cardiology;  Laterality: N/A;    CORONARY ANGIOPLASTY      CORONARY ARTERY BYPASS GRAFT      CORONARY BYPASS GRAFT ANGIOGRAPHY  10/04/2021    Procedure: Bypass graft study;  Surgeon: Jose Ruiz MD;  Location: [facility] CATH LAB;  Service: Cardiology;;    CORONARY BYPASS GRAFT ANGIOGRAPHY  3/2/2023    Procedure: Bypass graft study;  Surgeon: Devon Garnica MD;  Location: [facility] CATH LAB;  Service: Cardiology;;    ECHOCARDIOGRAM,TRANSESOPHAGEAL N/A 4/14/2023    Procedure: Transesophageal echo (KIRSTEN) intra-procedure log documentation;  Surgeon: [facility] Diagnostic Provider;  Location: [facility] EP LAB;  Service: Cardiology;  Laterality: N/A;    ENDOSCOPIC  NASAL CAUTERIZATION Bilateral 11/3/2023    Procedure: CAUTERIZATION, NOSE, ENDOSCOPIC;  Surgeon: Jono Russell MD;  Location: [facility] OR 2ND FLR;  Service: ENT;  Laterality: Bilateral;    ENDOSCOPIC NASAL CAUTERIZATION N/A 12/18/2023    Procedure: CAUTERIZATION, NOSE, ENDOSCOPIC;  Surgeon: Jono Russell MD;  Location: [facility] OR 2ND FLR;  Service: ENT;  Laterality: N/A;    EYE SURGERY      FESS, WITH IMAGING GUIDANCE Left 2/28/2024    Procedure: FESS, WITH IMAGING GUIDANCE;  Surgeon: Jono Russell MD;  Location: [facility] OR 2ND FLR;  Service: ENT;  Laterality: Left;  Scan loaded [facility]    FUNCTIONAL ENDOSCOPIC SINUS SURGERY (FESS) Bilateral 6/14/2023    Procedure: FESS (FUNCTIONAL ENDOSCOPIC SINUS SURGERY);  Surgeon: Jono Russell MD;  Location: [facility] OR 2ND FLR;  Service: ENT;  Laterality: Bilateral;    HERNIA REPAIR      INTRAOCULAR PROSTHESES INSERTION Left 11/13/2022    Procedure: INSERTION, IOL PROSTHESIS;  Surgeon: Alia Mckeon MD;  Location: [facility] OR 1ST FLR;  Service: Ophthalmology;  Laterality: Left;    INTRAOCULAR PROSTHESES INSERTION Right 12/04/2022    Procedure: INSERTION, IOL PROSTHESIS;  Surgeon: Alia Mckeon MD;  Location: [facility] OR 1ST FLR;  Service: Ophthalmology;  Laterality: Right;    LIGATION, ARTERY, ETHMOIDAL Left 2/28/2024    Procedure: LIGATION, ARTERY, ETHMOIDAL;  Surgeon: Jono Russell MD;  Location: [facility] OR 2ND FLR;  Service: ENT;  Laterality: Left;    LIGATION, ARTERY, SPHENOPALATINE, ENDOSCOPIC Bilateral 6/14/2023    Procedure: LIGATION, ARTERY, SPHENOPALATINE, ENDOSCOPIC;  Surgeon: Jono Russell MD;  Location: [facility] OR 2ND FLR;  Service: ENT;  Laterality: Bilateral;    PHACOEMULSIFICATION OF CATARACT Left 11/13/2022    Procedure: PHACOEMULSIFICATION, CATARACT;  Surgeon: Alia Mckeon MD;  Location: [facility] OR 1ST FLR;  Service: Ophthalmology;  Laterality: Left;    PHACOEMULSIFICATION OF CATARACT Right 12/04/2022    Procedure: PHACOEMULSIFICATION, CATARACT;   Surgeon: Alia Mckeon MD;  Location: [facility] OR [address] [place] [address];  Service: Ophthalmology;  Laterality: Right;    SPINE SURGERY      TRANSESOPHAGEAL ECHOCARDIOGRAPHY N/A 3/22/2023    Procedure: ECHOCARDIOGRAM, TRANSESOPHAGEAL;  Surgeon: [facility] Diagnostic Provider;  Location: [facility] EP LAB;  Service: Anesthesiology;  Laterality: N/A;    TRANSESOPHAGEAL ECHOCARDIOGRAPHY N/A 2023    Procedure: ECHOCARDIOGRAM, TRANSESOPHAGEAL;  Surgeon: [facility] Diagnostic Provider;  Location: [facility] EP LAB;  Service: Anesthesiology;  Laterality: N/A;    VASECTOMY       Family History       Problem Relation (Age of Onset)    Alcohol abuse Father    Diabetes Brother    Heart disease Mother, Father, Brother, Sister    Heart failure Mother, Father, Brother    No Known Problems Sister, Maternal Grandmother, Maternal Grandfather, Paternal Grandmother, Paternal Grandfather, Maternal Aunt, Maternal Uncle, Paternal Aunt, Paternal Uncle          Tobacco Use    Smoking status: Former     Current packs/day: 0.00     Average packs/day: 1 pack/day for 20.0 years (20.0 ttl pk-yrs)     Types: Cigarettes     Start date: 1960     Quit date: 1980     Years since quittin.7     Passive exposure: Never    Smokeless tobacco: Never   Substance and Sexual Activity    Alcohol use: No    Drug use: No    Sexual activity: Not Currently     Partners: Female     Review of Systems   All other systems reviewed and are negative.    Objective:     Vital Signs (Most Recent):  Temp: 97.5 °F (36.4 °C) (24 0645)  Pulse: 63 (24 0849)  Resp: 17 (24 0645)  BP: 124/72 (24 0849)  SpO2: 100 % (24 0849) Vital Signs (24h Range):  Temp:  [97.4 °F (36.3 °C)-98.3 °F (36.8 °C)] 97.5 °F (36.4 °C)  Pulse:  [51-72] 63  Resp:  [14-24] 17  SpO2:  [96 %-100 %] 100 %  BP: (118-150)/(57-72) 124/72     Weight: 69.9 kg (154 lb)  Body mass index is 25.63 kg/m².         Physical Exam  Constitutional:       General: He is not in acute distress.  HENT:      Nose: No  congestion.      Comments: Rhinorocket to L nared, hemostatic  R side without evidnece of bleeding     Mouth/Throat:      Mouth: Mucous membranes are dry.      Pharynx: Oropharynx is clear.      Comments: No blood in oropharynx  Eyes:      Extraocular Movements: Extraocular movements intact.   Pulmonary:      Effort: No respiratory distress.      Breath sounds: No stridor.   Neurological:      Mental Status: He is alert.          Significant Labs:  CBC:   Recent Labs   Lab 06/13/24  0540   WBC 5.44   RBC 2.56*   HGB 7.4*   HCT 23.2*      MCV 91   MCH 28.9   MCHC 31.9*       Significant Diagnostics:  None    Assessment/Plan:     Recurrent epistaxis  83M w extensive PMH, known to ENT service for refractory epistaxis status post bilateral SP ligation followed by bilateral embolization, cautery on the left septum for persistent bleeding on 11/03/2023 and again recently on 12/18/23. He recently underwent left AEA ligation on 2/28/24 and nasal cautery on 5/21. Presents to ED w further bleeding, rhinorocket placed and remains hemostatic. Supratherapeutic INR.     - No acute ENT intervention at this time as patient is currently hemostatic with packing, will keep in place   - Patient to remain inpatient while packing in place, ENT will CTM  - Anti-staph prophylaxis while rhinorocket in place   - Recommend prompt correction of supratherapeutic INR, as this is likely cause of refractory bleeding  - Recommend against any instrumentation of the nose  - Recommend against nasal cannula - if patient requires supp O2, recommend humidified O2 via face tent or mask  - For further bleeding, recommend liberally spraying Afrin and holding manual pressure for 10 min. For further bleeding, page ENT on call. May require angiography and poss emobilization w IR as salvage treatment  - Please page w questions or concerns         VTE Risk Mitigation (From admission, onward)           Ordered     Reason for No Pharmacological VTE  Prophylaxis  Once        Comments: Supratherapeutic inr   Question:  Reasons:  Answer:  Physician Provided (leave comment)    06/12/24 2221     IP VTE HIGH RISK PATIENT  Once         06/12/24 2221     Place sequential compression device  Until discontinued         06/12/24 2221                    Susanna Fuentes MD  Otorhinolaryngology-Head & Neck Surgery  [facility] - Emergency Dept

## 2024-06-13 NOTE — ED NOTES
Assumed care of the patient. Report received from RADHA Bain. Pt on continuous cardiac monitoring. Pt in hospital gown, side rails up X2, bed low and locked, and call light is placed within reach. One family/visitors at bedside at this time. Pt denies any complaints or needs.

## 2024-06-13 NOTE — SUBJECTIVE & OBJECTIVE
Past Medical History:   Diagnosis Date    Anemia of chronic renal failure, stage 3 (moderate) 05/27/2015    Anticoagulant long-term use     Atherosclerosis of coronary artery bypass graft of native heart without angina pectoris 09/11/2012    3-27-18 [facility] Two vessel coronary artery disease.   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    Bilateral carotid artery disease 02/09/2017    Bleeding from the nose     Bleeding nose 03/21/2018    Cancer     Cataract     CHF (congestive heart failure)     CKD (chronic kidney disease) stage 3, GFR 30-59 ml/min 05/27/2015    Claudication of left lower extremity 09/17/2014    Colon polyp     Encounter for blood transfusion     Gastroesophageal reflux disease without esophagitis 03/19/2018    Gastrointestinal hemorrhage associated with intestinal diverticulosis 04/01/2018    Glaucoma     H/O mechanical aortic valve replacement 09/17/2014    History of gout 09/26/2012    Hyperparathyroidism due to renal insufficiency 07/27/2015    Internal hemorrhoid 04/03/2018    Long term current use of anticoagulant therapy 09/26/2012    Mechanical heart valve present     Metabolic acidosis with normal anion gap and bicarbonate losses 03/20/2018    Mixed hyperlipidemia 09/26/2012    NSTEMI (non-ST elevated myocardial infarction) 03/21/2018    Obesity, diabetes, and hypertension syndrome 02/23/2016    Paroxysmal atrial fibrillation 02/06/2023    PVD (peripheral vascular disease) 09/11/2012    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       Past Surgical History:   Procedure Laterality Date    BACK SURGERY      CARDIAC CATHETERIZATION      CARDIAC VALVE  REPLACEMENT      CARDIAC VALVE SURGERY      CARPAL TUNNEL RELEASE Right 05/19/2020    Procedure: RELEASE, CARPAL TUNNEL;  Surgeon: Rupesh Norris Jr., MD;  Location: [facility];  Service: Plastics;  Laterality: Right;    CATARACT EXTRACTION Left 11/13/2022        COLON SURGERY      COLONOSCOPY N/A 03/31/2017    Procedure: COLONOSCOPY;  Surgeon: Bruno Raymond MD;  Location: [facility] ENDO (4TH FLR);  Service: Endoscopy;  Laterality: N/A;  Patient's wife requesting date.    COLONOSCOPY N/A 04/03/2018    Procedure: COLONOSCOPY;  Surgeon: Bonifacio Pelletier MD;  Location: [facility] ENDO (2ND FLR);  Service: Endoscopy;  Laterality: N/A;    COLONOSCOPY N/A 08/13/2018    Procedure: COLONOSCOPY;  Surgeon: Kam Barba MD;  Location: [facility] ENDO (2ND FLR);  Service: Endoscopy;  Laterality: N/A;  2nd floor: PA pressure 49; hx of moderate-severe valve disease     per Coumadin clinic-Patient can hold 5 days with lovenox bridge       ok to schedule per Katarina    CORONARY ANGIOGRAPHY N/A 10/04/2021    Procedure: Left heart cath +/- peripheral angiogram;  Surgeon: Jose Ruiz MD;  Location: [facility] CATH LAB;  Service: Cardiology;  Laterality: N/A;    CORONARY ANGIOGRAPHY N/A 3/2/2023    Procedure: Angiogram, Coronary;  Surgeon: Devon Garnica MD;  Location: [facility] CATH LAB;  Service: Cardiology;  Laterality: N/A;    CORONARY ANGIOPLASTY      CORONARY ARTERY BYPASS GRAFT      CORONARY BYPASS GRAFT ANGIOGRAPHY  10/04/2021    Procedure: Bypass graft study;  Surgeon: Jose Ruiz MD;  Location: [facility] CATH LAB;  Service: Cardiology;;    CORONARY BYPASS GRAFT ANGIOGRAPHY  3/2/2023    Procedure: Bypass graft study;  Surgeon: Devon Garnica MD;  Location: [facility] CATH LAB;  Service: Cardiology;;    ECHOCARDIOGRAM,TRANSESOPHAGEAL N/A 4/14/2023    Procedure: Transesophageal echo (KIRSTEN) intra-procedure log documentation;  Surgeon: Dos Diagnostic Provider;  Location: [facility] EP LAB;  Service: Cardiology;  Laterality: N/A;    ENDOSCOPIC  NASAL CAUTERIZATION Bilateral 11/3/2023    Procedure: CAUTERIZATION, NOSE, ENDOSCOPIC;  Surgeon: Jono Russell MD;  Location: [facility] OR 2ND FLR;  Service: ENT;  Laterality: Bilateral;    ENDOSCOPIC NASAL CAUTERIZATION N/A 12/18/2023    Procedure: CAUTERIZATION, NOSE, ENDOSCOPIC;  Surgeon: Jono Russell MD;  Location: [facility] OR 2ND FLR;  Service: ENT;  Laterality: N/A;    EYE SURGERY      FESS, WITH IMAGING GUIDANCE Left 2/28/2024    Procedure: FESS, WITH IMAGING GUIDANCE;  Surgeon: Jono Russell MD;  Location: [facility] OR 2ND FLR;  Service: ENT;  Laterality: Left;  Scan loaded ENT [facility]    FUNCTIONAL ENDOSCOPIC SINUS SURGERY (FESS) Bilateral 6/14/2023    Procedure: FESS (FUNCTIONAL ENDOSCOPIC SINUS SURGERY);  Surgeon: Jono Russell MD;  Location: [facility] OR 2ND FLR;  Service: ENT;  Laterality: Bilateral;    HERNIA REPAIR      INTRAOCULAR PROSTHESES INSERTION Left 11/13/2022    Procedure: INSERTION, IOL PROSTHESIS;  Surgeon: Alia Mckeon MD;  Location: [facility] OR 1ST FLR;  Service: Ophthalmology;  Laterality: Left;    INTRAOCULAR PROSTHESES INSERTION Right 12/04/2022    Procedure: INSERTION, IOL PROSTHESIS;  Surgeon: Alia Mckeon MD;  Location: [facility] OR 1ST FLR;  Service: Ophthalmology;  Laterality: Right;    LIGATION, ARTERY, ETHMOIDAL Left 2/28/2024    Procedure: LIGATION, ARTERY, ETHMOIDAL;  Surgeon: Jono Russell MD;  Location: [facility] OR 2ND FLR;  Service: ENT;  Laterality: Left;    LIGATION, ARTERY, SPHENOPALATINE, ENDOSCOPIC Bilateral 6/14/2023    Procedure: LIGATION, ARTERY, SPHENOPALATINE, ENDOSCOPIC;  Surgeon: Jono Russell MD;  Location: [facility] OR 2ND FLR;  Service: ENT;  Laterality: Bilateral;    PHACOEMULSIFICATION OF CATARACT Left 11/13/2022    Procedure: PHACOEMULSIFICATION, CATARACT;  Surgeon: Alia Mckeon MD;  Location: [facility] OR 1ST FLR;  Service: Ophthalmology;  Laterality: Left;    PHACOEMULSIFICATION OF CATARACT Right 12/04/2022    Procedure: PHACOEMULSIFICATION, CATARACT;   Surgeon: Alia Mckeon MD;  [facility];  Service: Ophthalmology;  Laterality: Right;    SPINE SURGERY      TRANSESOPHAGEAL ECHOCARDIOGRAPHY N/A 3/22/2023    Procedure: ECHOCARDIOGRAM, TRANSESOPHAGEAL;  Surgeon: [facility] Diagnostic Provider;  Location: [facility] EP LAB;  Service: Anesthesiology;  Laterality: N/A;    TRANSESOPHAGEAL ECHOCARDIOGRAPHY N/A 4/11/2023    Procedure: ECHOCARDIOGRAM, TRANSESOPHAGEAL;  Surgeon: [facility] Diagnostic Provider;  Location: [facility] EP LAB;  Service: Anesthesiology;  Laterality: N/A;    VASECTOMY         Review of patient's allergies indicates:   Allergen Reactions    Lisinopril     Losartan      Intolerance- elevates potassium level       Current Facility-Administered Medications on File Prior to Encounter   Medication    ceFAZolin 2 g in dextrose 5 % in water (D5W) 50 mL IVPB (MB+)    HYDROmorphone injection 0.2 mg    sodium chloride 0.9% flush 10 mL     Current Outpatient Medications on File Prior to Encounter   Medication Sig    acetaminophen (TYLENOL) 500 MG tablet Take 500 mg by mouth daily as needed for Pain.    allopurinoL (ZYLOPRIM) 100 MG tablet Take 1 tablet (100 mg total) by mouth once daily.    amLODIPine (NORVASC) 5 MG tablet Take 1 tablet (5 mg total) by mouth once daily.    bumetanide (BUMEX) 1 MG tablet Take 2 tablets (2 mg total) by mouth every other day.    ferrous gluconate (FERGON) 324 MG tablet TAKE 1 TABLET EVERY DAY WITH BREAKFAST    isosorbide mononitrate (IMDUR) 60 MG 24 hr tablet TAKE 1 TABLET EVERY EVENING    metoprolol succinate (TOPROL-XL) 25 MG 24 hr tablet Take 1 tablet (25 mg total) by mouth once daily.    omega-3 fatty acids/fish oil (FISH OIL-OMEGA-3 FATTY ACIDS) 300-1,000 mg capsule Take 1 capsule by mouth once daily.    rosuvastatin (CRESTOR) 40 MG Tab TAKE 1 TABLET EVERY EVENING.    sodium chloride (SALINE NASAL) 0.65 % nasal spray 2 sprays by Nasal route 3 (three) times daily.    traZODone (DESYREL) 50 MG tablet Take 1 tablet (50 mg total) by  mouth every evening.    warfarin (COUMADIN) 5 MG tablet TAKE 1/2 TABLET (2.5MG) SATURDAY, THEN TAKE 1 TABLET (5MG) ALL OTHER DAYS OR AS INSTRUCTED BY COUMADIN CLINIC     Family History       Problem Relation (Age of Onset)    Alcohol abuse Father    Diabetes Brother    Heart disease Mother, Father, Brother, Sister    Heart failure Mother, Father, Brother    No Known Problems Sister, Maternal Grandmother, Maternal Grandfather, Paternal Grandmother, Paternal Grandfather, Maternal Aunt, Maternal Uncle, Paternal Aunt, Paternal Uncle          Tobacco Use    Smoking status: Former     Current packs/day: 0.00     Average packs/day: 1 pack/day for 20.0 years (20.0 ttl pk-yrs)     Types: Cigarettes     Start date: 1960     Quit date: 1980     Years since quittin.7     Passive exposure: Never    Smokeless tobacco: Never   Substance and Sexual Activity    Alcohol use: No    Drug use: No    Sexual activity: Not Currently     Partners: Female     Review of Systems   Respiratory:  Negative for chest tightness and shortness of breath.    Cardiovascular:  Negative for chest pain.   Gastrointestinal: Negative.    Genitourinary: Negative.    Psychiatric/Behavioral:  Negative for confusion.      Objective:     Vital Signs (Most Recent):  Temp: 98 °F (36.7 °C) (24)  Pulse: (!) 53 (24)  Resp: (!) 22 (24)  BP: (!) 150/63 (24)  SpO2: 99 % (24) Vital Signs (24h Range):  Temp:  [98 °F (36.7 °C)-98.2 °F (36.8 °C)] 98 °F (36.7 °C)  Pulse:  [53-61] 53  Resp:  [19-24] 22  SpO2:  [96 %-100 %] 99 %  BP: (118-150)/(57-65) 150/63     Weight: 69.9 kg (154 lb)  Body mass index is 25.63 kg/m².     Physical Exam  Vitals and nursing note reviewed.   Constitutional:       General: He is not in acute distress.     Appearance: He is not diaphoretic.   HENT:      Head: Normocephalic and atraumatic.      Nose:      Comments: Left nare rhino-rocket  Eyes:      General: No scleral  icterus.  Cardiovascular:      Rate and Rhythm: Regular rhythm. Bradycardia present.      Comments: Mechanical click  Pulmonary:      Effort: Pulmonary effort is normal. No respiratory distress.      Breath sounds: No wheezing or rales.   Abdominal:      General: Bowel sounds are normal.      Palpations: Abdomen is soft.   Musculoskeletal:      Right lower leg: No edema.      Left lower le+ Pitting Edema present.   Skin:     General: Skin is warm and dry.   Neurological:      General: No focal deficit present.      Mental Status: He is alert and oriented to person, place, and time.                Significant Labs: All pertinent labs within the past 24 hours have been reviewed.  CBC:   Recent Labs   Lab 24  1422 24  1753   WBC 5.64 6.02   HGB 7.1* 6.6*   HCT 22.7* 21.4*    171     CMP:   Recent Labs   Lab 24  1422      K 4.5      CO2 23   *   BUN 73*   CREATININE 2.6*   CALCIUM 10.0   PROT 6.6   ALBUMIN 3.1*   BILITOT 0.4   ALKPHOS 56   AST 16   ALT 15   ANIONGAP 9     Coagulation:   Recent Labs   Lab 24  1422   INR 4.4*       Significant Imaging: I have reviewed all pertinent imaging results/findings within the past 24 hours.

## 2024-06-13 NOTE — TELEPHONE ENCOUNTER
Ms. Bustos stated that they still will admit pt but she was just notifying us that they wasn't able to admit pt today because of this issue.     Lory GALLAGHER

## 2024-06-14 LAB
ANION GAP SERPL CALC-SCNC: 13 MMOL/L (ref 8–16)
BUN SERPL-MCNC: 85 MG/DL (ref 8–23)
CALCIUM SERPL-MCNC: 10.2 MG/DL (ref 8.7–10.5)
CHLORIDE SERPL-SCNC: 105 MMOL/L (ref 95–110)
CO2 SERPL-SCNC: 21 MMOL/L (ref 23–29)
CREAT SERPL-MCNC: 2.4 MG/DL (ref 0.5–1.4)
ERYTHROCYTE [DISTWIDTH] IN BLOOD BY AUTOMATED COUNT: 17.6 % (ref 11.5–14.5)
ERYTHROCYTE [DISTWIDTH] IN BLOOD BY AUTOMATED COUNT: 17.9 % (ref 11.5–14.5)
ERYTHROCYTE [DISTWIDTH] IN BLOOD BY AUTOMATED COUNT: 17.9 % (ref 11.5–14.5)
ERYTHROCYTE [DISTWIDTH] IN BLOOD BY AUTOMATED COUNT: 18.1 % (ref 11.5–14.5)
EST. GFR  (NO RACE VARIABLE): 26.3 ML/MIN/1.73 M^2
GLUCOSE SERPL-MCNC: 107 MG/DL (ref 70–110)
HCT VFR BLD AUTO: 29.9 % (ref 40–54)
HCT VFR BLD AUTO: 30.4 % (ref 40–54)
HCT VFR BLD AUTO: 30.8 % (ref 40–54)
HCT VFR BLD AUTO: 31.2 % (ref 40–54)
HGB BLD-MCNC: 10 G/DL (ref 14–18)
HGB BLD-MCNC: 10.1 G/DL (ref 14–18)
HGB BLD-MCNC: 9.6 G/DL (ref 14–18)
HGB BLD-MCNC: 9.6 G/DL (ref 14–18)
INR PPP: 2.1 (ref 0.8–1.2)
MAGNESIUM SERPL-MCNC: 2 MG/DL (ref 1.6–2.6)
MCH RBC QN AUTO: 29.2 PG (ref 27–31)
MCH RBC QN AUTO: 29.2 PG (ref 27–31)
MCH RBC QN AUTO: 29.4 PG (ref 27–31)
MCH RBC QN AUTO: 29.6 PG (ref 27–31)
MCHC RBC AUTO-ENTMCNC: 31.6 G/DL (ref 32–36)
MCHC RBC AUTO-ENTMCNC: 32.1 G/DL (ref 32–36)
MCHC RBC AUTO-ENTMCNC: 32.4 G/DL (ref 32–36)
MCHC RBC AUTO-ENTMCNC: 32.5 G/DL (ref 32–36)
MCV RBC AUTO: 90 FL (ref 82–98)
MCV RBC AUTO: 91 FL (ref 82–98)
MCV RBC AUTO: 91 FL (ref 82–98)
MCV RBC AUTO: 92 FL (ref 82–98)
PHOSPHATE SERPL-MCNC: 4.5 MG/DL (ref 2.7–4.5)
PLATELET # BLD AUTO: 148 K/UL (ref 150–450)
PLATELET # BLD AUTO: 153 K/UL (ref 150–450)
PLATELET # BLD AUTO: 157 K/UL (ref 150–450)
PLATELET # BLD AUTO: 161 K/UL (ref 150–450)
PMV BLD AUTO: 10 FL (ref 9.2–12.9)
PMV BLD AUTO: 10.1 FL (ref 9.2–12.9)
PMV BLD AUTO: 10.9 FL (ref 9.2–12.9)
PMV BLD AUTO: 11.6 FL (ref 9.2–12.9)
POCT GLUCOSE: 110 MG/DL (ref 70–110)
POCT GLUCOSE: 121 MG/DL (ref 70–110)
POCT GLUCOSE: 129 MG/DL (ref 70–110)
POCT GLUCOSE: 132 MG/DL (ref 70–110)
POTASSIUM SERPL-SCNC: 4.3 MMOL/L (ref 3.5–5.1)
PROTHROMBIN TIME: 22.4 SEC (ref 9–12.5)
RBC # BLD AUTO: 3.27 M/UL (ref 4.6–6.2)
RBC # BLD AUTO: 3.29 M/UL (ref 4.6–6.2)
RBC # BLD AUTO: 3.38 M/UL (ref 4.6–6.2)
RBC # BLD AUTO: 3.46 M/UL (ref 4.6–6.2)
SODIUM SERPL-SCNC: 139 MMOL/L (ref 136–145)
WBC # BLD AUTO: 7.34 K/UL (ref 3.9–12.7)
WBC # BLD AUTO: 8.41 K/UL (ref 3.9–12.7)
WBC # BLD AUTO: 8.66 K/UL (ref 3.9–12.7)
WBC # BLD AUTO: 9.09 K/UL (ref 3.9–12.7)

## 2024-06-14 PROCEDURE — 85027 COMPLETE CBC AUTOMATED: CPT | Mod: HCNC | Performed by: HOSPITALIST

## 2024-06-14 PROCEDURE — 21400001 HC TELEMETRY ROOM: Mod: HCNC

## 2024-06-14 PROCEDURE — 25000003 PHARM REV CODE 250: Mod: HCNC | Performed by: HOSPITALIST

## 2024-06-14 PROCEDURE — 36415 COLL VENOUS BLD VENIPUNCTURE: CPT | Mod: HCNC,XB | Performed by: HOSPITALIST

## 2024-06-14 PROCEDURE — 84100 ASSAY OF PHOSPHORUS: CPT | Mod: HCNC | Performed by: HOSPITALIST

## 2024-06-14 PROCEDURE — 85610 PROTHROMBIN TIME: CPT | Mod: HCNC | Performed by: HOSPITALIST

## 2024-06-14 PROCEDURE — 80048 BASIC METABOLIC PNL TOTAL CA: CPT | Mod: HCNC | Performed by: HOSPITALIST

## 2024-06-14 PROCEDURE — 83735 ASSAY OF MAGNESIUM: CPT | Mod: HCNC | Performed by: HOSPITALIST

## 2024-06-14 RX ORDER — OXYCODONE HYDROCHLORIDE 5 MG/1
5 TABLET ORAL EVERY 6 HOURS PRN
Status: DISCONTINUED | OUTPATIENT
Start: 2024-06-14 | End: 2024-06-19 | Stop reason: HOSPADM

## 2024-06-14 RX ORDER — WARFARIN 2.5 MG/1
2.5 TABLET ORAL ONCE
Status: COMPLETED | OUTPATIENT
Start: 2024-06-14 | End: 2024-06-14

## 2024-06-14 RX ADMIN — ACETAMINOPHEN 650 MG: 325 TABLET ORAL at 07:06

## 2024-06-14 RX ADMIN — ACETAMINOPHEN 650 MG: 325 TABLET ORAL at 11:06

## 2024-06-14 RX ADMIN — METOPROLOL SUCCINATE 25 MG: 25 TABLET, EXTENDED RELEASE ORAL at 09:06

## 2024-06-14 RX ADMIN — ALLOPURINOL 100 MG: 100 TABLET ORAL at 09:06

## 2024-06-14 RX ADMIN — ATORVASTATIN CALCIUM 80 MG: 40 TABLET, FILM COATED ORAL at 08:06

## 2024-06-14 RX ADMIN — OXYCODONE 5 MG: 5 TABLET ORAL at 03:06

## 2024-06-14 RX ADMIN — AMOXICILLIN AND CLAVULANATE POTASSIUM 500 MG: 500; 125 TABLET, FILM COATED ORAL at 09:06

## 2024-06-14 RX ADMIN — WARFARIN SODIUM 2.5 MG: 2.5 TABLET ORAL at 03:06

## 2024-06-14 RX ADMIN — Medication 324 MG: at 07:06

## 2024-06-14 RX ADMIN — AMOXICILLIN AND CLAVULANATE POTASSIUM 500 MG: 500; 125 TABLET, FILM COATED ORAL at 08:06

## 2024-06-14 RX ADMIN — OXYCODONE 5 MG: 5 TABLET ORAL at 08:06

## 2024-06-14 RX ADMIN — BUMETANIDE 2 MG: 1 TABLET ORAL at 09:06

## 2024-06-14 NOTE — CONSULTS
Hospital Medicine Pharmacy Consult Note    Pharmacy to review dose of warfarin  Dariel Urbina is a 83 y.o. male on warfarin therapy for A.fib Mechanical aortic Valve. PharmD has been consulted for warfarin dosing.    Current order: Holding  Home dose: Warfarin 5 mg PO QDay  Coumadin clinic enrollment: Active  INR goal: 2-3    Lab Results   Component Value Date    INR 2.1 (H) 06/14/2024    INR 6.2 (HH) 06/13/2024    INR 3.7 (H) 06/12/2024     Significant drug interactions: allopurinol- pt has been on allopurinol and it is not a new medication  Diet:  diet soft & bite sized, low sodium, coumadin restriction, fluid 1500 mL, thin  without supplements     Recommendation(s):   INR 2.1  On 6/13/24 INR 6.2- Vitamin K 10 mg IV x1 was given  If appropriate to restart  Would recommend restarting on Warfarin 2.5 mg PO x1  Daily INR  Pharmacy will continue to follow and monitor warfarin    Thank you for the consult,  Payton Leavitt  Extension [phone]    **Note: Consults are reviewed Monday-Friday 7:00am-3:30pm. The above recommendations are only suggested. The recommendations should be considered in conjunction with all patient factors.**

## 2024-06-14 NOTE — ED NOTES
Telemetry Verification   Patient placed on Telemetry Box  Verified with War Room  Box # [id number]       Rate 66   Rhythm NS

## 2024-06-14 NOTE — PLAN OF CARE
[facility] - Med Surg  Initial Discharge Assessment       Primary Care Provider: Devon Langston Jr., MD    Admission Diagnosis: Epistaxis [R04.0]  Chest pain [R07.9]    Admission Date: 6/12/2024  Expected Discharge Date:     Transition of Care Barriers: (P) None    Payor: [employer] MANAGED MEDICARE / Plan: [employer] MEDICARE HMO / Product Type: Capitation /     Extended Emergency Contact Information  Primary Emergency Contact: Ameena Urbina  Address: [address] [place] [address] [place] [address] [facility]  Home Phone: [phone]  Mobile Phone: [phone]  Relation: Spouse    Discharge Plan A: (P) Home with family  Discharge Plan B: (P) Home      [facility] - [place], [place] - [address]  [address] [address]  Phone: [phone] Fax: [phone]    [facility]  [address] [address]  Phone: [phone] Fax: [phone]    [facility]/pharmacy #[id number] - [place], [place] - [address]  [address]  [place] [address]  Phone: [phone] Fax: [phone]      CM met with patient to discuss discharge planning. Patient lives with Ameena Urbina (spouse) [phone] in a single story home with no steps to enter. Prior to hospital admission, patient was independent with ADLs and did not require medical equipment. Discharge Plan A and Plan B have been determined by review of patient's clinical status, future medical and therapeutic needs, and coverage/benefits for post-acute care in coordination with multidisciplinary team members.  Initial Assessment (most recent)       Adult Discharge Assessment - 06/14/24 1441          Discharge Assessment    Assessment Type Discharge Planning Assessment (P)      Confirmed/corrected address, phone number and insurance Yes (P)      Confirmed Demographics Correct on Facesheet (P)      Source of Information patient;family (P)      Communicated ASTER with patient/caregiver Date not available/Unable to  determine (P)      Reason For Admission Epistaxis (P)      People in Home spouse (P)      Facility Arrived From: home (P)      Do you expect to return to your current living situation? Yes (P)      Do you have help at home or someone to help you manage your care at home? Yes (P)      Who are your caregiver(s) and their phone number(s)? Ameena Urbina (spouse) [phone] (P)      Prior to hospitilization cognitive status: Alert/Oriented (P)      Current cognitive status: Alert/Oriented (P)      Walking or Climbing Stairs Difficulty yes (P)      Walking or Climbing Stairs -- (P)    none    Mobility Management none (P)      Dressing/Bathing Difficulty no (P)      Home Accessibility wheelchair accessible (P)      Home Layout Able to live on 1st floor (P)      Equipment Currently Used at Home none (P)      Readmission within 30 days? Yes (P)      Patient currently being followed by outpatient case management? No (P)      Do you currently have service(s) that help you manage your care at home? No (P)      Do you take prescription medications? Yes (P)      Do you have prescription coverage? Yes (P)      Coverage [employer] (P)      Do you have any problems affording any of your prescribed medications? No (P)      Is the patient taking medications as prescribed? yes (P)      Who is going to help you get home at discharge? Ameena Tonyajean (spouse) [phone] (P)      How do you get to doctors appointments? family or friend will provide (P)      Are you on dialysis? No (P)      Do you take coumadin? No (P)      Discharge Plan A Home with family (P)      Discharge Plan B Home (P)      DME Needed Upon Discharge  none (P)      Discharge Plan discussed with: Spouse/sig other;Patient (P)      Name(s) and Number(s) Ameena Urbina (spouse) [phone] (P)      Transition of Care Barriers None (P)         Physical Activity    On average, how many days per week do you engage in moderate to strenuous exercise (like a brisk  walk)? 0 days (P)      On average, how many minutes do you engage in exercise at this level? 0 min (P)         Housing Stability    In the last 12 months, was there a time when you were not able to pay the mortgage or rent on time? No (P)      At any time in the past 12 months, were you homeless or living in a shelter (including now)? No (P)         Transportation Needs    Has the lack of transportation kept you from medical appointments, meetings, work or from getting things needed for daily living? No (P)         Food Insecurity    Within the past 12 months, you worried that your food would run out before you got the money to buy more. Never true (P)      Within the past 12 months, the food you bought just didn't last and you didn't have money to get more. Never true (P)         Stress    Do you feel stress - tense, restless, nervous, or anxious, or unable to sleep at night because your mind is troubled all the time - these days? Only a little (P)         Social Isolation    How often do you feel lonely or isolated from those around you?  Never (P)         Alcohol Use    Q1: How often do you have a drink containing alcohol? Never (P)      Q2: How many drinks containing alcohol do you have on a typical day when you are drinking? Patient does not drink (P)      Q3: How often do you have six or more drinks on one occasion? Never (P)         Utilities    In the past 12 months has the electric, gas, oil, or water company threatened to shut off services in your home? No (P)         Health Literacy    How often do you need to have someone help you when you read instructions, pamphlets, or other written material from your doctor or pharmacy? Rarely (P)         OTHER    Name(s) of People in Home Ameena Urbina (spouse) [phone] (P)                      Readmission Assessment (most recent)       Readmission Assessment - 06/14/24 8149          Readmission    Was this a planned readmission? No     Why were you hospitalized  in the last 30 days? Anemia     Why were you readmitted? New medical problem     When you left the hospital how did you feel? Much Better     When you left the hospital where did you go? Home with Family     Did patient/caregiver refused recommended DC plan? No     Tell me about what happened between when you left the hospital and the day you returned. Nose began bleeding     When did you start not feeling well? the day of admission     Did you try to manage your symptoms your self? Yes     What did you do? Pinched nose     Did you call anyone? No     Did you try to see or did see a doctor or nurse before you came? No     Did you have  a follow-up appointment on discharge? Yes     Did you go? Yes                        NAHID Lucas  Case Management  [phone]

## 2024-06-14 NOTE — SUBJECTIVE & OBJECTIVE
Interval History:   : Hg stabilized, nasal bleeding has subsided.     Review of Systems   Respiratory:  Negative for chest tightness and shortness of breath.    Cardiovascular:  Negative for chest pain.   Gastrointestinal: Negative.    Genitourinary: Negative.    Psychiatric/Behavioral:  Negative for confusion.      Objective:     Vital Signs (Most Recent):  Temp: 98.1 °F (36.7 °C) (24 1141)  Pulse: 64 (24 1450)  Resp: 18 (24 1541)  BP: (!) 160/66 (24 1141)  SpO2: 98 % (24 1141) Vital Signs (24h Range):  Temp:  [96.8 °F (36 °C)-98.1 °F (36.7 °C)] 98.1 °F (36.7 °C)  Pulse:  [56-69] 64  Resp:  [14-20] 18  SpO2:  [96 %-100 %] 98 %  BP: (126-167)/(50-76) 160/66     Weight: 69.9 kg (154 lb)  Body mass index is 25.63 kg/m².    Intake/Output Summary (Last 24 hours) at 2024 1547  Last data filed at 2024 0932  Gross per 24 hour   Intake 286 ml   Output --   Net 286 ml         Physical Exam  Vitals and nursing note reviewed.   Constitutional:       General: He is not in acute distress.     Appearance: He is not diaphoretic.   HENT:      Head: Normocephalic and atraumatic.      Nose:      Comments: Left nare rhino-rocket  Eyes:      General: No scleral icterus.  Cardiovascular:      Rate and Rhythm: Regular rhythm. Bradycardia present.      Comments: Mechanical click  Pulmonary:      Effort: Pulmonary effort is normal. No respiratory distress.      Breath sounds: No wheezing or rales.   Abdominal:      General: Bowel sounds are normal.      Palpations: Abdomen is soft.   Musculoskeletal:      Right lower leg: No edema.      Left lower le+ Pitting Edema present.   Skin:     General: Skin is warm and dry.   Neurological:      General: No focal deficit present.      Mental Status: He is alert and oriented to person, place, and time.             Significant Labs: All pertinent labs within the past 24 hours have been reviewed.    Significant Imaging: I have reviewed all pertinent  imaging results/findings within the past 24 hours.

## 2024-06-14 NOTE — ED NOTES
Bed: EDOU10  Expected date: 6/13/24  Expected time: 7:17 PM  Means of arrival:   Comments:  Admit casillas

## 2024-06-14 NOTE — PLAN OF CARE
Otolaryngology Plan of Care Note    Called due to bright red blood coming from R nare that did not resolve with afrin and pressure.     On evaluation patient adamant that blood is coming from the L side, denies any coughing up blood or sensation of swallowing blood.     On exam, unable to identify source of bleeding from right nare. Blood noted in right oropharynx but not actively bleeding. L RR noted to be only partially inflated with saline.     L RR inflated with additional 5 cc of air. Bleeding did not subside. 7.5 cm merocel placed on the right with resolution of bleeding.       Page ENT with questions or concerns.      Keira Mcmillan MD   Otolaryngology-Head and Neck Surgery   PGY3

## 2024-06-14 NOTE — ASSESSMENT & PLAN NOTE
83M w extensive PMH, known to ENT service for refractory epistaxis status post bilateral SP ligation followed by bilateral embolization, cautery on the left septum for persistent bleeding on 11/03/2023 and again recently on 12/18/23. He recently underwent left AEA ligation on 2/28/24 and nasal cautery on 5/21. Presents to ED w further bleeding, rhinorocket placed and remains hemostatic. Supratherapeutic INR.    Packed with right merocel and inflated left rhino more over night. No bleeding this morning     - No acute ENT intervention at this time as patient is currently hemostatic with packing, will keep in place   - Patient to remain inpatient while packing in place, ENT will CTM  - Anti-staph prophylaxis while rhinorocket in place   - Recommend prompt correction of supratherapeutic INR, as this is likely cause of refractory bleeding  - Recommend against any instrumentation of the nose  - Recommend against nasal cannula - if patient requires supp O2, recommend humidified O2 via face tent or mask  - For further bleeding, recommend liberally spraying Afrin and holding manual pressure for 10 min. For further bleeding, page ENT on call. May require angiography and poss emobilization w IR as salvage treatment  - Please page w questions or concerns

## 2024-06-14 NOTE — ED NOTES
Assumed care of pt from RADHA Almanza.    The patient is resting quietly in ED stretcher, and is AAOx4 at this time. Respirations are even and unlabored, with no distress noted. The patient is aware of POC and all questions and concerns addressed at this time. The patient was offered restroom assistance and denies need to void. The patient denies further needs and has no complaints at this time. SR raised x2, bed locked and in low position with brake engaged. Call bell within reach and the patient verbalized he would call for assistance if needed. Personal belongings are at bedside within reach.

## 2024-06-14 NOTE — PROGRESS NOTES
[facility] Medicine  Progress Note    Patient Name: Dariel Urbina  MRN: [id number]  Patient Class: IP- Inpatient   Admission Date: 6/12/2024  Length of Stay: 1 days  Attending Physician: Aga Hernandez MD  Primary Care Provider: Devon Langston Jr., MD        Subjective:     Principal Problem:Epistaxis        HPI:  82 y/o WM CAD s/p CABG, MR s/p mitraclib, Afib, Type 2 DM, CKD4,  s/p mechanical aortic valve placement on warfarin anticoagulation w/ hx of recurrent epistaxis presents to the ED for epistaxis.  He reports painless bleeding from left nare onset @ 0400 today.  He did not want to come to the hospital, but wife advised him to.  He is found with supratherapeutic INR 4.4.  His last dose of warfarin was yesterday.      ENT has placed rhino rocket to left nare. Consult note pending.  Patient reports its always left nare that has recurrent bleeding. He has Hemoglobin 6.6 and 1unit PRBC ordered in ED and infusing during my interview.      Pt has no complaints of chest pain/dyspnea, no nausea/vomiting, no hematemesis, hematuria, or other sites of bruising or bleeding.     [place] [facility] [place] reason (5/29-6/3), INR was >8 last admission. Required PRBC transfusion.  Hospital course was complicated by JIM on CKD and acute on chronic heart failure exacerbation.     Overview/Hospital Course:  No notes on file    Interval History:   6/14: Hg stabilized, nasal bleeding has subsided.     Review of Systems   Respiratory:  Negative for chest tightness and shortness of breath.    Cardiovascular:  Negative for chest pain.   Gastrointestinal: Negative.    Genitourinary: Negative.    Psychiatric/Behavioral:  Negative for confusion.      Objective:     Vital Signs (Most Recent):  Temp: 98.1 °F (36.7 °C) (06/14/24 1141)  Pulse: 64 (06/14/24 1450)  Resp: 18 (06/14/24 1541)  BP: (!) 160/66 (06/14/24 1141)  SpO2: 98 % (06/14/24 1141) Vital Signs (24h Range):  Temp:  [96.8 °F (36  °C)-98.1 °F (36.7 °C)] 98.1 °F (36.7 °C)  Pulse:  [56-69] 64  Resp:  [14-20] 18  SpO2:  [96 %-100 %] 98 %  BP: (126-167)/(50-76) 160/66     Weight: 69.9 kg (154 lb)  Body mass index is 25.63 kg/m².    Intake/Output Summary (Last 24 hours) at 2024 1547  Last data filed at 2024 0932  Gross per 24 hour   Intake 286 ml   Output --   Net 286 ml         Physical Exam  Vitals and nursing note reviewed.   Constitutional:       General: He is not in acute distress.     Appearance: He is not diaphoretic.   HENT:      Head: Normocephalic and atraumatic.      Nose:      Comments: Left nare rhino-rocket  Eyes:      General: No scleral icterus.  Cardiovascular:      Rate and Rhythm: Regular rhythm. Bradycardia present.      Comments: Mechanical click  Pulmonary:      Effort: Pulmonary effort is normal. No respiratory distress.      Breath sounds: No wheezing or rales.   Abdominal:      General: Bowel sounds are normal.      Palpations: Abdomen is soft.   Musculoskeletal:      Right lower leg: No edema.      Left lower le+ Pitting Edema present.   Skin:     General: Skin is warm and dry.   Neurological:      General: No focal deficit present.      Mental Status: He is alert and oriented to person, place, and time.             Significant Labs: All pertinent labs within the past 24 hours have been reviewed.    Significant Imaging: I have reviewed all pertinent imaging results/findings within the past 24 hours.    Assessment/Plan:      * Epistaxis  -recurrent left nare bleeding with supratherapeutic INR causing acute blood loss anemia.    -s/p Rhinorocket placement in ED  - w/ afrin spray and tranexamic acid intranasally   -Consult ENT in AM     -per last ENT inpatient consult recs   Avoid use of nasal cannula if any hypoxia develops - use only humidified o2 via face mask/face tent  For recurrent bleeding - given presence of rhino rocket - would contact ENT on-call  Prior ENT notes have mentioned possible need for  "angiography and embolization with IR.       Prior history of ENT procedures "bilateral SP ligation followed by bilateral embolization, cautery on the left septum for persistent bleeding on 11/03/2023 and again recently on 12/18/23. He recently underwent left AEA ligation on 2/28/24. Most recently seen in clinic 5/21 and underwent L sided cautery."    Coronary artery disease involving native coronary artery of native heart without angina pectoris  Patient with known CAD s/p CABG, which is controlled Will continue  beta blocker, ASA, and Statin and monitor for S/Sx of angina/ACS. Continue to monitor on telemetry.     HOLD IMDUR with blood loss anemia - resume when appropriate    Acute blood loss anemia  -secondary to epistaxis  -receiving 1unit PRBC this evening  -Check CBC every 6 hours and INR every 12 hours   -given hemostasis - last dose of warfarin on Tuesday.  PharmD consult to assist with management.    >will hold any FFP or vitamin K now - will f/u midnight INR if rising or not downtrending - will reconsider. Reversal vs lowering INR therapeies. 21.    H/O mechanical aortic valve replacement  As per chronic anticoagulation      Type 2 diabetes mellitus with stage 4 chronic kidney disease, without long-term current use of insulin  Trend chemistry panel  -glucose monitoring   -PRN insulin    Chronic anticoagulation  INR goal is 2.0-3.0   -last dose warfarin Tuesday  -q12 INR checks while supratherapeutic  -pharmD consult        VTE Risk Mitigation (From admission, onward)           Ordered     Reason for No Pharmacological VTE Prophylaxis  Once        Comments: Supratherapeutic inr   Question:  Reasons:  Answer:  Physician Provided (leave comment)    06/12/24 2221     IP VTE HIGH RISK PATIENT  Once         06/12/24 2221     Place sequential compression device  Until discontinued         06/12/24 2221                    Discharge Planning   ASTER:      Code Status: Full Code   Is the patient medically ready for " discharge?:     Reason for patient still in hospital (select all that apply): Patient trending condition  Discharge Plan A: Home with family                  Aga Hernandez MD  Department of Hospital Medicine   [facility]

## 2024-06-14 NOTE — PROGRESS NOTES
[facility]  Otorhinolaryngology-Head & Neck Surgery  Progress Note    Subjective:     Post-Op Info:  * No surgery found *      Hospital Day: 3     Interval History: Bleeding overnight, packed with right merocel and left rhino rocket inflated more. No bleeding this morning.     Medications:  Continuous Infusions:  Scheduled Meds:   allopurinoL  100 mg Oral Daily    amoxicillin-clavulanate 500-125mg  1 tablet Oral BID    atorvastatin  80 mg Oral QHS    bumetanide  2 mg Oral Every other day    ferrous gluconate  324 mg Oral Daily with breakfast    metoprolol succinate  25 mg Oral Daily    sodium chloride  2 spray Each Nostril TID     PRN Meds:  Current Facility-Administered Medications:     0.9%  NaCl infusion (for blood administration), , Intravenous, Q24H PRN    0.9%  NaCl infusion (for blood administration), , Intravenous, Q24H PRN    acetaminophen, 650 mg, Oral, Q4H PRN    aluminum-magnesium hydroxide-simethicone, 30 mL, Oral, QID PRN    dextrose 10%, 12.5 g, Intravenous, PRN    dextrose 10%, 25 g, Intravenous, PRN    glucagon (human recombinant), 1 mg, Intramuscular, PRN    glucose, 16 g, Oral, PRN    glucose, 24 g, Oral, PRN    insulin aspart U-100, 0-5 Units, Subcutaneous, QID (AC + HS) PRN    naloxone, 0.02 mg, Intravenous, PRN    ondansetron, 4 mg, Intravenous, Q8H PRN    oxymetazoline, 2 spray, Each Nostril, PRN    polyethylene glycol, 17 g, Oral, BID PRN    prochlorperazine, 5 mg, Intravenous, Q6H PRN    senna-docusate 8.6-50 mg, 1 tablet, Oral, Daily PRN    sodium chloride 0.9%, 10 mL, Intravenous, Q6H PRN    traZODone, 50 mg, Oral, Nightly PRN     Review of patient's allergies indicates:   Allergen Reactions    Lisinopril     Losartan      Intolerance- elevates potassium level     Objective:     Vital Signs (24h Range):  Temp:  [96.8 °F (36 °C)-98.6 °F (37 °C)] 97.4 °F (36.3 °C)  Pulse:  [49-67] 64  Resp:  [14-20] 18  SpO2:  [96 %-100 %] 98 %  BP: (117-167)/(50-80) 158/50        Lines/Drains/Airways       Peripheral Intravenous Line  Duration                  Peripheral IV - Single Lumen 06/12/24 1423 20 G Anterior;Left Forearm 1 day         Peripheral IV - Single Lumen 06/14/24 0011 22 G Anterior;Right Forearm <1 day                     Physical Exam     NAD  Right merocel with bloody saturation but no oozing from around, left rhino rocket in place, no oozing aroudn it  OP with thin streak of old bloody mucus, no active drainage or bleeding    Significant Labs:  All pertinent labs from the last 24 hours have been reviewed.    Significant Diagnostics:  I have reviewed all pertinent imaging results/findings within the past 24 hours.  Assessment/Plan:     Recurrent epistaxis  83M w extensive PMH, known to ENT service for refractory epistaxis status post bilateral SP ligation followed by bilateral embolization, cautery on the left septum for persistent bleeding on 11/03/2023 and again recently on 12/18/23. He recently underwent left AEA ligation on 2/28/24 and nasal cautery on 5/21. Presents to ED w further bleeding, rhinorocket placed and remains hemostatic. Supratherapeutic INR.    Packed with right merocel and inflated left rhino more over night. No bleeding this morning     - No acute ENT intervention at this time as patient is currently hemostatic with packing, will keep in place   - Patient to remain inpatient while packing in place, ENT will CTM  - Anti-staph prophylaxis while rhinorocket in place   - Recommend prompt correction of supratherapeutic INR, as this is likely cause of refractory bleeding  - Recommend against any instrumentation of the nose  - Recommend against nasal cannula - if patient requires supp O2, recommend humidified O2 via face tent or mask  - For further bleeding, recommend liberally spraying Afrin and holding manual pressure for 10 min. For further bleeding, page ENT on call. May require angiography and poss emobilization w IR as salvage treatment  - Please  page w questions or concerns           Kyle Lee MD  Otorhinolaryngology-Head & Neck Surgery  [facility]

## 2024-06-14 NOTE — SUBJECTIVE & OBJECTIVE
Interval History: Bleeding overnight, packed with right merocel and left rhino rocket inflated more. No bleeding this morning.     Medications:  Continuous Infusions:  Scheduled Meds:   allopurinoL  100 mg Oral Daily    amoxicillin-clavulanate 500-125mg  1 tablet Oral BID    atorvastatin  80 mg Oral QHS    bumetanide  2 mg Oral Every other day    ferrous gluconate  324 mg Oral Daily with breakfast    metoprolol succinate  25 mg Oral Daily    sodium chloride  2 spray Each Nostril TID     PRN Meds:  Current Facility-Administered Medications:     0.9%  NaCl infusion (for blood administration), , Intravenous, Q24H PRN    0.9%  NaCl infusion (for blood administration), , Intravenous, Q24H PRN    acetaminophen, 650 mg, Oral, Q4H PRN    aluminum-magnesium hydroxide-simethicone, 30 mL, Oral, QID PRN    dextrose 10%, 12.5 g, Intravenous, PRN    dextrose 10%, 25 g, Intravenous, PRN    glucagon (human recombinant), 1 mg, Intramuscular, PRN    glucose, 16 g, Oral, PRN    glucose, 24 g, Oral, PRN    insulin aspart U-100, 0-5 Units, Subcutaneous, QID (AC + HS) PRN    naloxone, 0.02 mg, Intravenous, PRN    ondansetron, 4 mg, Intravenous, Q8H PRN    oxymetazoline, 2 spray, Each Nostril, PRN    polyethylene glycol, 17 g, Oral, BID PRN    prochlorperazine, 5 mg, Intravenous, Q6H PRN    senna-docusate 8.6-50 mg, 1 tablet, Oral, Daily PRN    sodium chloride 0.9%, 10 mL, Intravenous, Q6H PRN    traZODone, 50 mg, Oral, Nightly PRN     Review of patient's allergies indicates:   Allergen Reactions    Lisinopril     Losartan      Intolerance- elevates potassium level     Objective:     Vital Signs (24h Range):  Temp:  [96.8 °F (36 °C)-98.6 °F (37 °C)] 97.4 °F (36.3 °C)  Pulse:  [49-67] 64  Resp:  [14-20] 18  SpO2:  [96 %-100 %] 98 %  BP: (117-167)/(50-80) 158/50       Lines/Drains/Airways       Peripheral Intravenous Line  Duration                  Peripheral IV - Single Lumen 06/12/24 1423 20 G Anterior;Left Forearm 1 day          Peripheral IV - Single Lumen 06/14/24 0011 22 G Anterior;Right Forearm <1 day                     Physical Exam     NAD  Right merocel with bloody saturation but no oozing from around, left rhino rocket in place, no oozing aroudn it  OP with thin streak of old bloody mucus, no active drainage or bleeding    Significant Labs:  All pertinent labs from the last 24 hours have been reviewed.    Significant Diagnostics:  I have reviewed all pertinent imaging results/findings within the past 24 hours.

## 2024-06-15 LAB
ANION GAP SERPL CALC-SCNC: 10 MMOL/L (ref 8–16)
BUN SERPL-MCNC: 64 MG/DL (ref 8–23)
CALCIUM SERPL-MCNC: 10.1 MG/DL (ref 8.7–10.5)
CHLORIDE SERPL-SCNC: 107 MMOL/L (ref 95–110)
CO2 SERPL-SCNC: 22 MMOL/L (ref 23–29)
CREAT SERPL-MCNC: 2 MG/DL (ref 0.5–1.4)
ERYTHROCYTE [DISTWIDTH] IN BLOOD BY AUTOMATED COUNT: 17.9 % (ref 11.5–14.5)
ERYTHROCYTE [DISTWIDTH] IN BLOOD BY AUTOMATED COUNT: 17.9 % (ref 11.5–14.5)
ERYTHROCYTE [DISTWIDTH] IN BLOOD BY AUTOMATED COUNT: 18 % (ref 11.5–14.5)
ERYTHROCYTE [DISTWIDTH] IN BLOOD BY AUTOMATED COUNT: 18.2 % (ref 11.5–14.5)
EST. GFR  (NO RACE VARIABLE): 32.7 ML/MIN/1.73 M^2
GLUCOSE SERPL-MCNC: 87 MG/DL (ref 70–110)
HCT VFR BLD AUTO: 29.9 % (ref 40–54)
HCT VFR BLD AUTO: 30.7 % (ref 40–54)
HCT VFR BLD AUTO: 32.1 % (ref 40–54)
HCT VFR BLD AUTO: 32.2 % (ref 40–54)
HGB BLD-MCNC: 10 G/DL (ref 14–18)
HGB BLD-MCNC: 10 G/DL (ref 14–18)
HGB BLD-MCNC: 9.3 G/DL (ref 14–18)
HGB BLD-MCNC: 9.6 G/DL (ref 14–18)
INR PPP: 1.2 (ref 0.8–1.2)
MAGNESIUM SERPL-MCNC: 1.8 MG/DL (ref 1.6–2.6)
MCH RBC QN AUTO: 29.2 PG (ref 27–31)
MCH RBC QN AUTO: 29.2 PG (ref 27–31)
MCH RBC QN AUTO: 29.3 PG (ref 27–31)
MCH RBC QN AUTO: 29.6 PG (ref 27–31)
MCHC RBC AUTO-ENTMCNC: 31.1 G/DL (ref 32–36)
MCHC RBC AUTO-ENTMCNC: 31.1 G/DL (ref 32–36)
MCHC RBC AUTO-ENTMCNC: 31.2 G/DL (ref 32–36)
MCHC RBC AUTO-ENTMCNC: 31.3 G/DL (ref 32–36)
MCV RBC AUTO: 93 FL (ref 82–98)
MCV RBC AUTO: 94 FL (ref 82–98)
MCV RBC AUTO: 94 FL (ref 82–98)
MCV RBC AUTO: 95 FL (ref 82–98)
PHOSPHATE SERPL-MCNC: 3.8 MG/DL (ref 2.7–4.5)
PLATELET # BLD AUTO: 154 K/UL (ref 150–450)
PLATELET # BLD AUTO: 156 K/UL (ref 150–450)
PLATELET # BLD AUTO: 162 K/UL (ref 150–450)
PLATELET # BLD AUTO: 172 K/UL (ref 150–450)
PMV BLD AUTO: 10.5 FL (ref 9.2–12.9)
PMV BLD AUTO: 10.5 FL (ref 9.2–12.9)
PMV BLD AUTO: 10.6 FL (ref 9.2–12.9)
PMV BLD AUTO: 10.7 FL (ref 9.2–12.9)
POCT GLUCOSE: 102 MG/DL (ref 70–110)
POCT GLUCOSE: 128 MG/DL (ref 70–110)
POCT GLUCOSE: 132 MG/DL (ref 70–110)
POCT GLUCOSE: 167 MG/DL (ref 70–110)
POTASSIUM SERPL-SCNC: 3.9 MMOL/L (ref 3.5–5.1)
PROTHROMBIN TIME: 13.3 SEC (ref 9–12.5)
RBC # BLD AUTO: 3.14 M/UL (ref 4.6–6.2)
RBC # BLD AUTO: 3.29 M/UL (ref 4.6–6.2)
RBC # BLD AUTO: 3.41 M/UL (ref 4.6–6.2)
RBC # BLD AUTO: 3.42 M/UL (ref 4.6–6.2)
SODIUM SERPL-SCNC: 139 MMOL/L (ref 136–145)
WBC # BLD AUTO: 6.83 K/UL (ref 3.9–12.7)
WBC # BLD AUTO: 6.91 K/UL (ref 3.9–12.7)
WBC # BLD AUTO: 7.13 K/UL (ref 3.9–12.7)
WBC # BLD AUTO: 7.6 K/UL (ref 3.9–12.7)

## 2024-06-15 PROCEDURE — 84100 ASSAY OF PHOSPHORUS: CPT | Mod: HCNC | Performed by: HOSPITALIST

## 2024-06-15 PROCEDURE — 80048 BASIC METABOLIC PNL TOTAL CA: CPT | Mod: HCNC | Performed by: HOSPITALIST

## 2024-06-15 PROCEDURE — 83735 ASSAY OF MAGNESIUM: CPT | Mod: HCNC | Performed by: HOSPITALIST

## 2024-06-15 PROCEDURE — 25000003 PHARM REV CODE 250: Mod: HCNC | Performed by: HOSPITALIST

## 2024-06-15 PROCEDURE — 85610 PROTHROMBIN TIME: CPT | Mod: HCNC | Performed by: HOSPITALIST

## 2024-06-15 PROCEDURE — 21400001 HC TELEMETRY ROOM: Mod: HCNC

## 2024-06-15 PROCEDURE — 36415 COLL VENOUS BLD VENIPUNCTURE: CPT | Mod: HCNC | Performed by: HOSPITALIST

## 2024-06-15 PROCEDURE — 85027 COMPLETE CBC AUTOMATED: CPT | Mod: 91,HCNC | Performed by: HOSPITALIST

## 2024-06-15 RX ORDER — WARFARIN 2.5 MG/1
2.5 TABLET ORAL ONCE
Status: COMPLETED | OUTPATIENT
Start: 2024-06-15 | End: 2024-06-15

## 2024-06-15 RX ORDER — WARFARIN SODIUM 5 MG/1
5 TABLET ORAL DAILY
Status: DISCONTINUED | OUTPATIENT
Start: 2024-06-16 | End: 2024-06-19 | Stop reason: HOSPADM

## 2024-06-15 RX ADMIN — WARFARIN SODIUM 2.5 MG: 2.5 TABLET ORAL at 06:06

## 2024-06-15 RX ADMIN — OXYCODONE 5 MG: 5 TABLET ORAL at 09:06

## 2024-06-15 RX ADMIN — Medication 324 MG: at 09:06

## 2024-06-15 RX ADMIN — OXYCODONE 5 MG: 5 TABLET ORAL at 08:06

## 2024-06-15 RX ADMIN — AMOXICILLIN AND CLAVULANATE POTASSIUM 500 MG: 500; 125 TABLET, FILM COATED ORAL at 09:06

## 2024-06-15 RX ADMIN — ALLOPURINOL 100 MG: 100 TABLET ORAL at 09:06

## 2024-06-15 RX ADMIN — ATORVASTATIN CALCIUM 80 MG: 40 TABLET, FILM COATED ORAL at 08:06

## 2024-06-15 RX ADMIN — METOPROLOL SUCCINATE 25 MG: 25 TABLET, EXTENDED RELEASE ORAL at 09:06

## 2024-06-15 RX ADMIN — AMOXICILLIN AND CLAVULANATE POTASSIUM 500 MG: 500; 125 TABLET, FILM COATED ORAL at 08:06

## 2024-06-15 NOTE — PLAN OF CARE
Pt has been complaining of headache pain throughout day. Also has complaints of some blood coming out of tear ducks out of eyes. Also voices agitation with nasal rocket but was educated about it and will need to leave it in for now as per primary attending. Risks were explained to pt and the reasoning of why it must be left in place at risk that if nasal rocket/plug is removed from nose, it will resume and continue bleeding. Pt and wife understood and pt will request pain medication as needed.   Problem: Adult Inpatient Plan of Care  Goal: Plan of Care Review  Outcome: Progressing  Goal: Patient-Specific Goal (Individualized)  Outcome: Progressing  Flowsheets (Taken 6/14/2024 1951)  Individualized Care Needs: Wants nasal rocket removed b/c of discomfort  Anxieties, Fears or Concerns: Ongoing Epistaxis issues  Patient/Family-Specific Goals (Include Timeframe): Pt wants to go back home with issue resolved  Goal: Absence of Hospital-Acquired Illness or Injury  Outcome: Progressing  Intervention: Identify and Manage Fall Risk  Flowsheets (Taken 6/14/2024 1900)  Safety Promotion/Fall Prevention:   assistive device/personal item within reach   bed alarm refused   Fall Risk reviewed with patient/family   family to remain at bedside   medications reviewed   nonskid shoes/socks when out of bed   side rails raised x 2   supervised activity   toileting scheduled   instructed to call staff for mobility  Intervention: Prevent Skin Injury  Flowsheets (Taken 6/14/2024 1900)  Body Position: position changed independently  Skin Protection:   incontinence pads utilized   skin sealant/moisture barrier applied  Device Skin Pressure Protection:   adhesive use limited   positioning supports utilized   pressure points protected   skin-to-device areas padded   skin-to-skin areas padded   tubing/devices free from skin contact  Intervention: Prevent and Manage VTE (Venous Thromboembolism) Risk  Flowsheets (Taken 6/14/2024 1900)  VTE  Prevention/Management:   ambulation promoted   bleeding precations maintained   bleeding risk assessed   bleeding risk factor(s) identified, provider notified   dorsiflexion/plantar flexion performed   ROM (active) performed   ROM (passive) performed  Intervention: Prevent Infection  Flowsheets (Taken 6/14/2024 1900)  Infection Prevention:   cohorting utilized   environmental surveillance performed   equipment surfaces disinfected   hand hygiene promoted   personal protective equipment utilized   rest/sleep promoted   single patient room provided   visitors restricted/screened  Goal: Optimal Comfort and Wellbeing  Outcome: Progressing  Intervention: Monitor Pain and Promote Comfort  Flowsheets (Taken 6/14/2024 1900)  Pain Management Interventions:   care clustered   medication offered   pain management plan reviewed with patient/caregiver   position adjusted   quiet environment facilitated   relaxation techniques promoted  Intervention: Provide Person-Centered Care  Flowsheets (Taken 6/14/2024 1900)  Trust Relationship/Rapport:   care explained   choices provided   questions answered   questions encouraged   reassurance provided   thoughts/feelings acknowledged  Goal: Readiness for Transition of Care  Outcome: Progressing  Intervention: Mutually Develop Transition Plan  Flowsheets (Taken 6/14/2024 1900)  Equipment Currently Used at Home: none  Transportation Anticipated: family or friend will provide  Communicated ASTER with patient/caregiver: Yes  Do you expect to return to your current living situation?: Yes  Do you have help at home or someone to help you manage your care at home?: Yes  Readmission within 30 days?: Yes  Do you currently have service(s) that help you manage your care at home?: No  Is the pt/caregiver preference to resume services with current agency: No     Problem: Infection  Goal: Absence of Infection Signs and Symptoms  Outcome: Progressing  Intervention: Prevent or Manage Infection  Flowsheets  (Taken 6/14/2024 1900)  Infection Management: aseptic technique maintained  Isolation Precautions: protective     Problem: Diabetes Comorbidity  Goal: Blood Glucose Level Within Targeted Range  Outcome: Progressing  Intervention: Monitor and Manage Glycemia  Flowsheets (Taken 6/14/2024 1900)  Glycemic Management: blood glucose monitored     Problem: Fall Injury Risk  Goal: Absence of Fall and Fall-Related Injury  Outcome: Progressing  Intervention: Identify and Manage Contributors  Flowsheets (Taken 6/14/2024 1900)  Self-Care Promotion:   independence encouraged   BADL personal objects within reach   BADL personal routines maintained   meal set-up provided  Medication Review/Management:   medications reviewed   high-risk medications identified  Intervention: Promote Injury-Free Environment  Flowsheets (Taken 6/14/2024 1900)  Safety Promotion/Fall Prevention:   assistive device/personal item within reach   bed alarm refused   Fall Risk reviewed with patient/family   family to remain at bedside   medications reviewed   nonskid shoes/socks when out of bed   side rails raised x 2   supervised activity   toileting scheduled   instructed to call staff for mobility     Problem: Pain Acute  Goal: Optimal Pain Control and Function  Outcome: Progressing  Intervention: Develop Pain Management Plan  Flowsheets (Taken 6/14/2024 1900)  Pain Management Interventions:   care clustered   medication offered   pain management plan reviewed with patient/caregiver   position adjusted   quiet environment facilitated   relaxation techniques promoted  Intervention: Prevent or Manage Pain  Flowsheets (Taken 6/14/2024 1900)  Sleep/Rest Enhancement:   family presence promoted   regular sleep/rest pattern promoted   relaxation techniques promoted  Sensory Stimulation Regulation:   care clustered   quiet environment promoted   television on  Bowel Elimination Promotion: ambulation promoted  Medication Review/Management:   medications reviewed    high-risk medications identified  Intervention: Optimize Psychosocial Wellbeing  Flowsheets (Taken 6/14/2024 1900)  Supportive Measures:   active listening utilized   positive reinforcement provided   relaxation techniques promoted   self-care encouraged   self-reflection promoted   verbalization of feelings encouraged  Diversional Activities:   smartphone   television     Problem: Anemia  Goal: Anemia Symptom Improvement  Outcome: Progressing  Intervention: Monitor and Manage Anemia  Flowsheets (Taken 6/14/2024 1900)  Oral Nutrition Promotion:   adaptive equipment use encouraged   physical activity promoted   rest periods promoted  Safety Promotion/Fall Prevention:   assistive device/personal item within reach   bed alarm refused   Fall Risk reviewed with patient/family   family to remain at bedside   medications reviewed   nonskid shoes/socks when out of bed   side rails raised x 2   supervised activity   toileting scheduled   instructed to call staff for mobility  Fatigue Management:   activity assistance provided   frequent rest breaks encouraged

## 2024-06-15 NOTE — PLAN OF CARE
Problem: Adult Inpatient Plan of Care  Goal: Plan of Care Review  Outcome: Progressing  Goal: Patient-Specific Goal (Individualized)  Outcome: Progressing  Goal: Absence of Hospital-Acquired Illness or Injury  Outcome: Progressing  Goal: Optimal Comfort and Wellbeing  Outcome: Progressing  Goal: Readiness for Transition of Care  Outcome: Progressing     Problem: Infection  Goal: Absence of Infection Signs and Symptoms  Outcome: Progressing     Problem: Diabetes Comorbidity  Goal: Blood Glucose Level Within Targeted Range  Outcome: Progressing     Problem: Acute Kidney Injury/Impairment  Goal: Fluid and Electrolyte Balance  Outcome: Progressing  Goal: Improved Oral Intake  Outcome: Progressing  Goal: Effective Renal Function  Outcome: Progressing     Problem: Fall Injury Risk  Goal: Absence of Fall and Fall-Related Injury  Outcome: Progressing     Problem: Pain Acute  Goal: Optimal Pain Control and Function  Outcome: Progressing     Problem: Anemia  Goal: Anemia Symptom Improvement  Outcome: Progressing

## 2024-06-15 NOTE — PLAN OF CARE
Problem: Adult Inpatient Plan of Care  Goal: Plan of Care Review  Outcome: Not Progressing  Goal: Patient-Specific Goal (Individualized)  Outcome: Not Progressing  Goal: Absence of Hospital-Acquired Illness or Injury  Outcome: Not Progressing  Goal: Optimal Comfort and Wellbeing  Outcome: Not Progressing  Goal: Readiness for Transition of Care  Outcome: Not Progressing     Problem: Infection  Goal: Absence of Infection Signs and Symptoms  Outcome: Not Progressing     Problem: Diabetes Comorbidity  Goal: Blood Glucose Level Within Targeted Range  Outcome: Not Progressing     Problem: Acute Kidney Injury/Impairment  Goal: Fluid and Electrolyte Balance  Outcome: Not Progressing  Goal: Improved Oral Intake  Outcome: Not Progressing  Goal: Effective Renal Function  Outcome: Not Progressing     Problem: Fall Injury Risk  Goal: Absence of Fall and Fall-Related Injury  Outcome: Not Progressing     Problem: Pain Acute  Goal: Optimal Pain Control and Function  Outcome: Not Progressing     Problem: Anemia  Goal: Anemia Symptom Improvement  Outcome: Not Progressing

## 2024-06-16 LAB
ERYTHROCYTE [DISTWIDTH] IN BLOOD BY AUTOMATED COUNT: 17.5 % (ref 11.5–14.5)
ERYTHROCYTE [DISTWIDTH] IN BLOOD BY AUTOMATED COUNT: 17.8 % (ref 11.5–14.5)
ERYTHROCYTE [DISTWIDTH] IN BLOOD BY AUTOMATED COUNT: 17.8 % (ref 11.5–14.5)
HCT VFR BLD AUTO: 29.8 % (ref 40–54)
HCT VFR BLD AUTO: 30.6 % (ref 40–54)
HCT VFR BLD AUTO: 30.7 % (ref 40–54)
HGB BLD-MCNC: 9.7 G/DL (ref 14–18)
HGB BLD-MCNC: 9.7 G/DL (ref 14–18)
HGB BLD-MCNC: 9.8 G/DL (ref 14–18)
INR PPP: 1.1 (ref 0.8–1.2)
MCH RBC QN AUTO: 29.4 PG (ref 27–31)
MCH RBC QN AUTO: 29.9 PG (ref 27–31)
MCH RBC QN AUTO: 30 PG (ref 27–31)
MCHC RBC AUTO-ENTMCNC: 31.6 G/DL (ref 32–36)
MCHC RBC AUTO-ENTMCNC: 31.7 G/DL (ref 32–36)
MCHC RBC AUTO-ENTMCNC: 32.9 G/DL (ref 32–36)
MCV RBC AUTO: 91 FL (ref 82–98)
MCV RBC AUTO: 93 FL (ref 82–98)
MCV RBC AUTO: 95 FL (ref 82–98)
PLATELET # BLD AUTO: 156 K/UL (ref 150–450)
PLATELET # BLD AUTO: 162 K/UL (ref 150–450)
PLATELET # BLD AUTO: 170 K/UL (ref 150–450)
PMV BLD AUTO: 10 FL (ref 9.2–12.9)
PMV BLD AUTO: 10.8 FL (ref 9.2–12.9)
PMV BLD AUTO: 10.9 FL (ref 9.2–12.9)
POCT GLUCOSE: 118 MG/DL (ref 70–110)
POCT GLUCOSE: 136 MG/DL (ref 70–110)
POCT GLUCOSE: 226 MG/DL (ref 70–110)
POCT GLUCOSE: 83 MG/DL (ref 70–110)
PROTHROMBIN TIME: 12.2 SEC (ref 9–12.5)
RBC # BLD AUTO: 3.24 M/UL (ref 4.6–6.2)
RBC # BLD AUTO: 3.27 M/UL (ref 4.6–6.2)
RBC # BLD AUTO: 3.3 M/UL (ref 4.6–6.2)
WBC # BLD AUTO: 6.11 K/UL (ref 3.9–12.7)
WBC # BLD AUTO: 6.32 K/UL (ref 3.9–12.7)
WBC # BLD AUTO: 6.36 K/UL (ref 3.9–12.7)

## 2024-06-16 PROCEDURE — 25000003 PHARM REV CODE 250: Mod: HCNC | Performed by: HOSPITALIST

## 2024-06-16 PROCEDURE — 36415 COLL VENOUS BLD VENIPUNCTURE: CPT | Mod: HCNC,XB | Performed by: HOSPITALIST

## 2024-06-16 PROCEDURE — 63600175 PHARM REV CODE 636 W HCPCS: Mod: HCNC | Performed by: HOSPITALIST

## 2024-06-16 PROCEDURE — 85610 PROTHROMBIN TIME: CPT | Mod: HCNC | Performed by: HOSPITALIST

## 2024-06-16 PROCEDURE — 21400001 HC TELEMETRY ROOM: Mod: HCNC

## 2024-06-16 PROCEDURE — 36415 COLL VENOUS BLD VENIPUNCTURE: CPT | Mod: HCNC | Performed by: HOSPITALIST

## 2024-06-16 PROCEDURE — 85027 COMPLETE CBC AUTOMATED: CPT | Mod: HCNC | Performed by: HOSPITALIST

## 2024-06-16 RX ORDER — OXYCODONE AND ACETAMINOPHEN 10; 325 MG/1; MG/1
2 TABLET ORAL EVERY 6 HOURS PRN
Status: DISCONTINUED | OUTPATIENT
Start: 2024-06-16 | End: 2024-06-19 | Stop reason: HOSPADM

## 2024-06-16 RX ADMIN — WARFARIN SODIUM 5 MG: 5 TABLET ORAL at 05:06

## 2024-06-16 RX ADMIN — INSULIN ASPART 1 UNITS: 100 INJECTION, SOLUTION INTRAVENOUS; SUBCUTANEOUS at 10:06

## 2024-06-16 RX ADMIN — Medication 324 MG: at 08:06

## 2024-06-16 RX ADMIN — ALLOPURINOL 100 MG: 100 TABLET ORAL at 08:06

## 2024-06-16 RX ADMIN — BUMETANIDE 2 MG: 1 TABLET ORAL at 08:06

## 2024-06-16 RX ADMIN — AMOXICILLIN AND CLAVULANATE POTASSIUM 500 MG: 500; 125 TABLET, FILM COATED ORAL at 08:06

## 2024-06-16 RX ADMIN — METOPROLOL SUCCINATE 25 MG: 25 TABLET, EXTENDED RELEASE ORAL at 08:06

## 2024-06-16 RX ADMIN — SENNOSIDES AND DOCUSATE SODIUM 1 TABLET: 50; 8.6 TABLET ORAL at 02:06

## 2024-06-16 RX ADMIN — ATORVASTATIN CALCIUM 80 MG: 40 TABLET, FILM COATED ORAL at 08:06

## 2024-06-16 RX ADMIN — POLYETHYLENE GLYCOL 3350 17 G: 17 POWDER, FOR SOLUTION ORAL at 02:06

## 2024-06-16 NOTE — PLAN OF CARE
Patient understand the importance of keeping nasal packing intact. Patient  will have better control of headaches.  Problem: Adult Inpatient Plan of Care  Goal: Plan of Care Review  Outcome: Progressing  Goal: Patient-Specific Goal (Individualized)  Outcome: Progressing  Goal: Absence of Hospital-Acquired Illness or Injury  Outcome: Progressing  Goal: Optimal Comfort and Wellbeing  Outcome: Progressing  Goal: Readiness for Transition of Care  Outcome: Progressing     Problem: Infection  Goal: Absence of Infection Signs and Symptoms  Outcome: Progressing     Problem: Diabetes Comorbidity  Goal: Blood Glucose Level Within Targeted Range  Outcome: Progressing     Problem: Acute Kidney Injury/Impairment  Goal: Fluid and Electrolyte Balance  Outcome: Progressing  Goal: Improved Oral Intake  Outcome: Progressing  Goal: Effective Renal Function  Outcome: Progressing     Problem: Fall Injury Risk  Goal: Absence of Fall and Fall-Related Injury  Outcome: Progressing     Problem: Pain Acute  Goal: Optimal Pain Control and Function  Outcome: Progressing     Problem: Anemia  Goal: Anemia Symptom Improvement  Outcome: Progressing

## 2024-06-16 NOTE — SUBJECTIVE & OBJECTIVE
Interval History:   6/15: Discussed importance of leaving nasal packing in place.     Review of Systems   Respiratory:  Negative for chest tightness and shortness of breath.    Cardiovascular:  Negative for chest pain.   Gastrointestinal: Negative.    Genitourinary: Negative.    Psychiatric/Behavioral:  Negative for confusion.      Objective:     Vital Signs (Most Recent):  Temp: 97.6 °F (36.4 °C) (24 1134)  Pulse: 64 (24 1134)  Resp: 16 (24 1134)  BP: (!) 154/56 (24 1134)  SpO2: 99 % (24 1134) Vital Signs (24h Range):  Temp:  [97.4 °F (36.3 °C)-98.6 °F (37 °C)] 97.6 °F (36.4 °C)  Pulse:  [51-73] 64  Resp:  [16-18] 16  SpO2:  [95 %-99 %] 99 %  BP: (128-161)/(53-62) 154/56     Weight: 69.9 kg (154 lb)  Body mass index is 25.63 kg/m².  No intake or output data in the 24 hours ending 24 1432      Physical Exam  Vitals and nursing note reviewed.   Constitutional:       General: He is not in acute distress.     Appearance: He is not diaphoretic.   HENT:      Head: Normocephalic and atraumatic.      Nose:      Comments: Left nare rhino-rocket  Eyes:      General: No scleral icterus.  Cardiovascular:      Rate and Rhythm: Regular rhythm. Bradycardia present.      Comments: Mechanical click  Pulmonary:      Effort: Pulmonary effort is normal. No respiratory distress.      Breath sounds: No wheezing or rales.   Abdominal:      General: Bowel sounds are normal.      Palpations: Abdomen is soft.   Musculoskeletal:      Right lower leg: No edema.      Left lower le+ Pitting Edema present.   Skin:     General: Skin is warm and dry.   Neurological:      General: No focal deficit present.      Mental Status: He is alert and oriented to person, place, and time.             Significant Labs: All pertinent labs within the past 24 hours have been reviewed.    Significant Imaging: I have reviewed all pertinent imaging results/findings within the past 24 hours.

## 2024-06-16 NOTE — PROGRESS NOTES
[facility]  Progress Note    Patient Name: Dariel Urbina  MRN: [id number]  Patient Class: IP- Inpatient   Admission Date: 6/12/2024  Length of Stay: 3 days  Attending Physician: Aga Hernandez MD  Primary Care Provider: Devon Langston Jr., MD        Subjective:     Principal Problem:Epistaxis        HPI:  82 y/o WM CAD s/p CABG, MR s/p mitraclib, Afib, Type 2 DM, CKD4,  s/p mechanical aortic valve placement on warfarin anticoagulation w/ hx of recurrent epistaxis presents to the ED for epistaxis.  He reports painless bleeding from left nare onset @ 0400 today.  He did not want to come to the hospital, but wife advised him to.  He is found with supratherapeutic INR 4.4.  His last dose of warfarin was yesterday.      ENT has placed rhino rocket to left nare. Consult note pending.  Patient reports its always left nare that has recurrent bleeding. He has Hemoglobin 6.6 and 1unit PRBC ordered in ED and infusing during my interview.      Pt has no complaints of chest pain/dyspnea, no nausea/vomiting, no hematemesis, hematuria, or other sites of bruising or bleeding.     He was recently admitted to [facility] for same reason (5/29-6/3), INR was >8 last admission. Required PRBC transfusion.  Hospital course was complicated by JIM on CKD and acute on chronic heart failure exacerbation.     Overview/Hospital Course:  No notes on file    Interval History:   6/16: Some blood still present in sputum. Nasal packing remains in place INR low.     Review of Systems   Respiratory:  Negative for chest tightness and shortness of breath.    Cardiovascular:  Negative for chest pain.   Gastrointestinal: Negative.    Genitourinary: Negative.    Psychiatric/Behavioral:  Negative for confusion.      Objective:     Vital Signs (Most Recent):  Temp: 97.6 °F (36.4 °C) (06/16/24 1134)  Pulse: 64 (06/16/24 1134)  Resp: 16 (06/16/24 1134)  BP: (!) 154/56 (06/16/24 1134)  SpO2: 99 % (06/16/24 1134) Vital Signs (24h  Range):  Temp:  [97.4 °F (36.3 °C)-98.6 °F (37 °C)] 97.6 °F (36.4 °C)  Pulse:  [51-73] 64  Resp:  [16-18] 16  SpO2:  [95 %-99 %] 99 %  BP: (128-161)/(53-62) 154/56     Weight: 69.9 kg (154 lb)  Body mass index is 25.63 kg/m².  No intake or output data in the 24 hours ending 24 1430      Physical Exam  Vitals and nursing note reviewed.   Constitutional:       General: He is not in acute distress.     Appearance: He is not diaphoretic.   HENT:      Head: Normocephalic and atraumatic.      Nose:      Comments: Left nare rhino-rocket  Eyes:      General: No scleral icterus.  Cardiovascular:      Rate and Rhythm: Regular rhythm. Bradycardia present.      Comments: Mechanical click  Pulmonary:      Effort: Pulmonary effort is normal. No respiratory distress.      Breath sounds: No wheezing or rales.   Abdominal:      General: Bowel sounds are normal.      Palpations: Abdomen is soft.   Musculoskeletal:      Right lower leg: No edema.      Left lower le+ Pitting Edema present.   Skin:     General: Skin is warm and dry.   Neurological:      General: No focal deficit present.      Mental Status: He is alert and oriented to person, place, and time.             Significant Labs: All pertinent labs within the past 24 hours have been reviewed.    Significant Imaging: I have reviewed all pertinent imaging results/findings within the past 24 hours.    Assessment/Plan:      * Epistaxis  -recurrent left nare bleeding with supratherapeutic INR causing acute blood loss anemia.    -s/p Rhinorocket placement in ED  - w/ afrin spray and tranexamic acid intranasally   -Consult ENT in AM     -per last ENT inpatient consult recs   Avoid use of nasal cannula if any hypoxia develops - use only humidified o2 via face mask/face tent  For recurrent bleeding - given presence of rhino rocket - would contact ENT on-call  Prior ENT notes have mentioned possible need for angiography and embolization with IR.       Prior history of ENT  "procedures "bilateral SP ligation followed by bilateral embolization, cautery on the left septum for persistent bleeding on 11/03/2023 and again recently on 12/18/23. He recently underwent left AEA ligation on 2/28/24. Most recently seen in clinic 5/21 and underwent L sided cautery."    Coronary artery disease involving native coronary artery of native heart without angina pectoris  Patient with known CAD s/p CABG, which is controlled Will continue  beta blocker, ASA, and Statin and monitor for S/Sx of angina/ACS. Continue to monitor on telemetry.     HOLD IMDUR with blood loss anemia - resume when appropriate    Acute blood loss anemia  -secondary to epistaxis  -receiving 1unit PRBC this evening  -Check CBC every 6 hours and INR every 12 hours   -given hemostasis - last dose of warfarin on Tuesday.  PharmD consult to assist with management.    >will hold any FFP or vitamin K now - will f/u midnight INR if rising or not downtrending - will reconsider. Reversal vs lowering INR therapeies. 21.    H/O mechanical aortic valve replacement  As per chronic anticoagulation      Type 2 diabetes mellitus with stage 4 chronic kidney disease, without long-term current use of insulin  Trend chemistry panel  -glucose monitoring   -PRN insulin    Chronic anticoagulation  INR goal is 2.0-3.0   -last dose warfarin Tuesday  -q12 INR checks while supratherapeutic  -pharmD consult        VTE Risk Mitigation (From admission, onward)           Ordered     warfarin (COUMADIN) tablet 5 mg  Daily         06/15/24 1621     Reason for No Pharmacological VTE Prophylaxis  Once        Comments: Supratherapeutic inr   Question:  Reasons:  Answer:  Physician Provided (leave comment)    06/12/24 2221     IP VTE HIGH RISK PATIENT  Once         06/12/24 2221     Place sequential compression device  Until discontinued         06/12/24 2221                    Discharge Planning   ASTER: 6/16/2024     Code Status: Full Code   Is the patient medically " ready for discharge?:     Reason for patient still in hospital (select all that apply): Patient trending condition  Discharge Plan A: Home with family                  Aga Hernandez MD  Department of Hospital Medicine   [facility]

## 2024-06-16 NOTE — PROGRESS NOTES
[facility]  Progress Note    Patient Name: Dariel Urbina  MRN: [id number]  Patient Class: IP- Inpatient   Admission Date: 6/12/2024  Length of Stay: 3 days  Attending Physician: Aga Hernandez MD  Primary Care Provider: Devon Langston Jr., MD        Subjective:     Principal Problem:Epistaxis        HPI:  82 y/o WM CAD s/p CABG, MR s/p mitraclib, Afib, Type 2 DM, CKD4,  s/p mechanical aortic valve placement on warfarin anticoagulation w/ hx of recurrent epistaxis presents to the ED for epistaxis.  He reports painless bleeding from left nare onset @ 0400 today.  He did not want to come to the hospital, but wife advised him to.  He is found with supratherapeutic INR 4.4.  His last dose of warfarin was yesterday.      ENT has placed rhino rocket to left nare. Consult note pending.  Patient reports its always left nare that has recurrent bleeding. He has Hemoglobin 6.6 and 1unit PRBC ordered in ED and infusing during my interview.      Pt has no complaints of chest pain/dyspnea, no nausea/vomiting, no hematemesis, hematuria, or other sites of bruising or bleeding.     He was [place] [facility] [place] reason (5/29-6/3), INR was >8 last admission. Required PRBC transfusion.  Hospital course was complicated by JIM on CKD and acute on chronic heart failure exacerbation.     Overview/Hospital Course:  No notes on file    Interval History:   6/15: Discussed importance of leaving nasal packing in place.     Review of Systems   Respiratory:  Negative for chest tightness and shortness of breath.    Cardiovascular:  Negative for chest pain.   Gastrointestinal: Negative.    Genitourinary: Negative.    Psychiatric/Behavioral:  Negative for confusion.      Objective:     Vital Signs (Most Recent):  Temp: 97.6 °F (36.4 °C) (06/16/24 1134)  Pulse: 64 (06/16/24 1134)  Resp: 16 (06/16/24 1134)  BP: (!) 154/56 (06/16/24 1134)  SpO2: 99 % (06/16/24 1134) Vital Signs (24h Range):  Temp:  [97.4 °F  "(36.3 °C)-98.6 °F (37 °C)] 97.6 °F (36.4 °C)  Pulse:  [51-73] 64  Resp:  [16-18] 16  SpO2:  [95 %-99 %] 99 %  BP: (128-161)/(53-62) 154/56     Weight: 69.9 kg (154 lb)  Body mass index is 25.63 kg/m².  No intake or output data in the 24 hours ending 24 1432      Physical Exam  Vitals and nursing note reviewed.   Constitutional:       General: He is not in acute distress.     Appearance: He is not diaphoretic.   HENT:      Head: Normocephalic and atraumatic.      Nose:      Comments: Left nare rhino-rocket  Eyes:      General: No scleral icterus.  Cardiovascular:      Rate and Rhythm: Regular rhythm. Bradycardia present.      Comments: Mechanical click  Pulmonary:      Effort: Pulmonary effort is normal. No respiratory distress.      Breath sounds: No wheezing or rales.   Abdominal:      General: Bowel sounds are normal.      Palpations: Abdomen is soft.   Musculoskeletal:      Right lower leg: No edema.      Left lower le+ Pitting Edema present.   Skin:     General: Skin is warm and dry.   Neurological:      General: No focal deficit present.      Mental Status: He is alert and oriented to person, place, and time.             Significant Labs: All pertinent labs within the past 24 hours have been reviewed.    Significant Imaging: I have reviewed all pertinent imaging results/findings within the past 24 hours.    Assessment/Plan:      * Epistaxis  -recurrent left nare bleeding with supratherapeutic INR causing acute blood loss anemia.    -s/p Rhinorocket placement in ED  - w/ afrin spray and tranexamic acid intranasally   -Consult ENT in AM     -per last ENT inpatient consult recs   Avoid use of nasal cannula if any hypoxia develops - use only humidified o2 via face mask/face tent  For recurrent bleeding - given presence of rhino rocket - would contact ENT on-call  Prior ENT notes have mentioned possible need for angiography and embolization with IR.       Prior history of ENT procedures "bilateral SP " "ligation followed by bilateral embolization, cautery on the left septum for persistent bleeding on 11/03/2023 and again recently on 12/18/23. He recently underwent left AEA ligation on 2/28/24. Most recently seen in clinic 5/21 and underwent L sided cautery."    Coronary artery disease involving native coronary artery of native heart without angina pectoris  Patient with known CAD s/p CABG, which is controlled Will continue  beta blocker, ASA, and Statin and monitor for S/Sx of angina/ACS. Continue to monitor on telemetry.     HOLD IMDUR with blood loss anemia - resume when appropriate    Acute blood loss anemia  -secondary to epistaxis  -receiving 1unit PRBC this evening  -Check CBC every 6 hours and INR every 12 hours   -given hemostasis - last dose of warfarin on Tuesday.  PharmD consult to assist with management.    >will hold any FFP or vitamin K now - will f/u midnight INR if rising or not downtrending - will reconsider. Reversal vs lowering INR therapeies. 21.    H/O mechanical aortic valve replacement  As per chronic anticoagulation      Type 2 diabetes mellitus with stage 4 chronic kidney disease, without long-term current use of insulin  Trend chemistry panel  -glucose monitoring   -PRN insulin    Chronic anticoagulation  INR goal is 2.0-3.0   -last dose warfarin Tuesday  -q12 INR checks while supratherapeutic  -pharmD consult        VTE Risk Mitigation (From admission, onward)           Ordered     warfarin (COUMADIN) tablet 5 mg  Daily         06/15/24 1621     Reason for No Pharmacological VTE Prophylaxis  Once        Comments: Supratherapeutic inr   Question:  Reasons:  Answer:  Physician Provided (leave comment)    06/12/24 2221     IP VTE HIGH RISK PATIENT  Once         06/12/24 2221     Place sequential compression device  Until discontinued         06/12/24 2221                    Discharge Planning   ASTER: 6/16/2024     Code Status: Full Code   Is the patient medically ready for discharge?:     " Reason for patient still in hospital (select all that apply): Patient trending condition  Discharge Plan A: Home with family                  Aga Hernandez MD  Department of Hospital Medicine   [facility]

## 2024-06-16 NOTE — SUBJECTIVE & OBJECTIVE
Interval History:   : Some blood still present in sputum. Nasal packing remains in place INR low.     Review of Systems   Respiratory:  Negative for chest tightness and shortness of breath.    Cardiovascular:  Negative for chest pain.   Gastrointestinal: Negative.    Genitourinary: Negative.    Psychiatric/Behavioral:  Negative for confusion.      Objective:     Vital Signs (Most Recent):  Temp: 97.6 °F (36.4 °C) (24 1134)  Pulse: 64 (24 1134)  Resp: 16 (24 1134)  BP: (!) 154/56 (24 1134)  SpO2: 99 % (24 1134) Vital Signs (24h Range):  Temp:  [97.4 °F (36.3 °C)-98.6 °F (37 °C)] 97.6 °F (36.4 °C)  Pulse:  [51-73] 64  Resp:  [16-18] 16  SpO2:  [95 %-99 %] 99 %  BP: (128-161)/(53-62) 154/56     Weight: 69.9 kg (154 lb)  Body mass index is 25.63 kg/m².  No intake or output data in the 24 hours ending 24 1430      Physical Exam  Vitals and nursing note reviewed.   Constitutional:       General: He is not in acute distress.     Appearance: He is not diaphoretic.   HENT:      Head: Normocephalic and atraumatic.      Nose:      Comments: Left nare rhino-rocket  Eyes:      General: No scleral icterus.  Cardiovascular:      Rate and Rhythm: Regular rhythm. Bradycardia present.      Comments: Mechanical click  Pulmonary:      Effort: Pulmonary effort is normal. No respiratory distress.      Breath sounds: No wheezing or rales.   Abdominal:      General: Bowel sounds are normal.      Palpations: Abdomen is soft.   Musculoskeletal:      Right lower leg: No edema.      Left lower le+ Pitting Edema present.   Skin:     General: Skin is warm and dry.   Neurological:      General: No focal deficit present.      Mental Status: He is alert and oriented to person, place, and time.             Significant Labs: All pertinent labs within the past 24 hours have been reviewed.    Significant Imaging: I have reviewed all pertinent imaging results/findings within the past 24 hours.

## 2024-06-17 ENCOUNTER — ANESTHESIA EVENT (OUTPATIENT)
Dept: SURGERY | Facility: HOSPITAL | Age: 84
End: 2024-06-17
Payer: MEDICARE

## 2024-06-17 LAB
BASOPHILS # BLD AUTO: 0.04 K/UL (ref 0–0.2)
BASOPHILS NFR BLD: 0.7 % (ref 0–1.9)
DIFFERENTIAL METHOD BLD: ABNORMAL
EOSINOPHIL # BLD AUTO: 0.2 K/UL (ref 0–0.5)
EOSINOPHIL NFR BLD: 3.1 % (ref 0–8)
ERYTHROCYTE [DISTWIDTH] IN BLOOD BY AUTOMATED COUNT: 17.6 % (ref 11.5–14.5)
ERYTHROCYTE [DISTWIDTH] IN BLOOD BY AUTOMATED COUNT: 17.7 % (ref 11.5–14.5)
HCT VFR BLD AUTO: 28.1 % (ref 40–54)
HCT VFR BLD AUTO: 28.2 % (ref 40–54)
HCT VFR BLD AUTO: 28.5 % (ref 40–54)
HCT VFR BLD AUTO: 31.1 % (ref 40–54)
HGB BLD-MCNC: 8.9 G/DL (ref 14–18)
HGB BLD-MCNC: 9.1 G/DL (ref 14–18)
HGB BLD-MCNC: 9.1 G/DL (ref 14–18)
HGB BLD-MCNC: 9.8 G/DL (ref 14–18)
IMM GRANULOCYTES # BLD AUTO: 0.02 K/UL (ref 0–0.04)
IMM GRANULOCYTES NFR BLD AUTO: 0.3 % (ref 0–0.5)
INR PPP: 1.1 (ref 0.8–1.2)
LYMPHOCYTES # BLD AUTO: 1 K/UL (ref 1–4.8)
LYMPHOCYTES NFR BLD: 17.4 % (ref 18–48)
MCH RBC QN AUTO: 29.3 PG (ref 27–31)
MCH RBC QN AUTO: 29.4 PG (ref 27–31)
MCH RBC QN AUTO: 29.6 PG (ref 27–31)
MCH RBC QN AUTO: 29.8 PG (ref 27–31)
MCHC RBC AUTO-ENTMCNC: 31.5 G/DL (ref 32–36)
MCHC RBC AUTO-ENTMCNC: 31.6 G/DL (ref 32–36)
MCHC RBC AUTO-ENTMCNC: 31.9 G/DL (ref 32–36)
MCHC RBC AUTO-ENTMCNC: 32.4 G/DL (ref 32–36)
MCV RBC AUTO: 92 FL (ref 82–98)
MCV RBC AUTO: 92 FL (ref 82–98)
MCV RBC AUTO: 93 FL (ref 82–98)
MCV RBC AUTO: 94 FL (ref 82–98)
MONOCYTES # BLD AUTO: 0.8 K/UL (ref 0.3–1)
MONOCYTES NFR BLD: 13.5 % (ref 4–15)
NEUTROPHILS # BLD AUTO: 3.7 K/UL (ref 1.8–7.7)
NEUTROPHILS NFR BLD: 65 % (ref 38–73)
NRBC BLD-RTO: 0 /100 WBC
PLATELET # BLD AUTO: 155 K/UL (ref 150–450)
PLATELET # BLD AUTO: 157 K/UL (ref 150–450)
PLATELET # BLD AUTO: 160 K/UL (ref 150–450)
PLATELET # BLD AUTO: 164 K/UL (ref 150–450)
PMV BLD AUTO: 10.2 FL (ref 9.2–12.9)
PMV BLD AUTO: 10.3 FL (ref 9.2–12.9)
PMV BLD AUTO: 10.8 FL (ref 9.2–12.9)
PMV BLD AUTO: 10.8 FL (ref 9.2–12.9)
POCT GLUCOSE: 144 MG/DL (ref 70–110)
POCT GLUCOSE: 154 MG/DL (ref 70–110)
POCT GLUCOSE: 193 MG/DL (ref 70–110)
POCT GLUCOSE: 220 MG/DL (ref 70–110)
PROTHROMBIN TIME: 12.2 SEC (ref 9–12.5)
RBC # BLD AUTO: 3.04 M/UL (ref 4.6–6.2)
RBC # BLD AUTO: 3.05 M/UL (ref 4.6–6.2)
RBC # BLD AUTO: 3.1 M/UL (ref 4.6–6.2)
RBC # BLD AUTO: 3.31 M/UL (ref 4.6–6.2)
WBC # BLD AUTO: 5.76 K/UL (ref 3.9–12.7)
WBC # BLD AUTO: 5.76 K/UL (ref 3.9–12.7)
WBC # BLD AUTO: 5.8 K/UL (ref 3.9–12.7)
WBC # BLD AUTO: 5.93 K/UL (ref 3.9–12.7)

## 2024-06-17 PROCEDURE — 21400001 HC TELEMETRY ROOM: Mod: HCNC

## 2024-06-17 PROCEDURE — 85610 PROTHROMBIN TIME: CPT | Mod: HCNC | Performed by: HOSPITALIST

## 2024-06-17 PROCEDURE — 25000003 PHARM REV CODE 250: Mod: HCNC | Performed by: HOSPITALIST

## 2024-06-17 PROCEDURE — 85027 COMPLETE CBC AUTOMATED: CPT | Mod: 91,HCNC | Performed by: HOSPITALIST

## 2024-06-17 PROCEDURE — 36415 COLL VENOUS BLD VENIPUNCTURE: CPT | Mod: HCNC | Performed by: HOSPITALIST

## 2024-06-17 PROCEDURE — 94761 N-INVAS EAR/PLS OXIMETRY MLT: CPT | Mod: HCNC

## 2024-06-17 PROCEDURE — 85025 COMPLETE CBC W/AUTO DIFF WBC: CPT | Mod: HCNC | Performed by: HOSPITALIST

## 2024-06-17 RX ORDER — AMOXICILLIN AND CLAVULANATE POTASSIUM 500; 125 MG/1; MG/1
1 TABLET, FILM COATED ORAL 2 TIMES DAILY
Status: CANCELLED | OUTPATIENT
Start: 2024-06-17 | End: 2024-06-18

## 2024-06-17 RX ADMIN — WARFARIN SODIUM 5 MG: 5 TABLET ORAL at 05:06

## 2024-06-17 RX ADMIN — AMOXICILLIN AND CLAVULANATE POTASSIUM 500 MG: 500; 125 TABLET, FILM COATED ORAL at 09:06

## 2024-06-17 RX ADMIN — ALLOPURINOL 100 MG: 100 TABLET ORAL at 08:06

## 2024-06-17 RX ADMIN — Medication 324 MG: at 08:06

## 2024-06-17 RX ADMIN — ACETAMINOPHEN 650 MG: 325 TABLET ORAL at 06:06

## 2024-06-17 RX ADMIN — METOPROLOL SUCCINATE 25 MG: 25 TABLET, EXTENDED RELEASE ORAL at 08:06

## 2024-06-17 RX ADMIN — ATORVASTATIN CALCIUM 80 MG: 40 TABLET, FILM COATED ORAL at 09:06

## 2024-06-17 RX ADMIN — ACETAMINOPHEN 650 MG: 325 TABLET ORAL at 02:06

## 2024-06-17 RX ADMIN — AMOXICILLIN AND CLAVULANATE POTASSIUM 500 MG: 500; 125 TABLET, FILM COATED ORAL at 08:06

## 2024-06-17 NOTE — SUBJECTIVE & OBJECTIVE
Interval History:   : continue to monitor for signs of bleeding    Review of Systems   Respiratory:  Negative for chest tightness and shortness of breath.    Cardiovascular:  Negative for chest pain.   Gastrointestinal: Negative.    Genitourinary: Negative.    Psychiatric/Behavioral:  Negative for confusion.      Objective:     Vital Signs (Most Recent):  Temp: 99.3 °F (37.4 °C) (24 1449)  Pulse: 71 (24 1438)  Resp: 18 (24 1127)  BP: (!) 135/58 (24 1127)  SpO2: (!) 93 % (24 1127) Vital Signs (24h Range):  Temp:  [97.3 °F (36.3 °C)-99.3 °F (37.4 °C)] 99.3 °F (37.4 °C)  Pulse:  [42-71] 71  Resp:  [16-18] 18  SpO2:  [93 %-98 %] 93 %  BP: (110-146)/(48-82) 135/58     Weight: 69.9 kg (154 lb)  Body mass index is 25.63 kg/m².    Intake/Output Summary (Last 24 hours) at 2024 1457  Last data filed at 2024 0029  Gross per 24 hour   Intake 360 ml   Output --   Net 360 ml         Physical Exam  Vitals and nursing note reviewed.   Constitutional:       General: He is not in acute distress.     Appearance: He is not diaphoretic.   HENT:      Head: Normocephalic and atraumatic.      Nose:      Comments: Left nare rhino-rocket  Eyes:      General: No scleral icterus.  Cardiovascular:      Rate and Rhythm: Regular rhythm. Bradycardia present.      Comments: Mechanical click  Pulmonary:      Effort: Pulmonary effort is normal. No respiratory distress.      Breath sounds: No wheezing or rales.   Abdominal:      General: Bowel sounds are normal.      Palpations: Abdomen is soft.   Musculoskeletal:      Right lower leg: No edema.      Left lower le+ Pitting Edema present.   Skin:     General: Skin is warm and dry.   Neurological:      General: No focal deficit present.      Mental Status: He is alert and oriented to person, place, and time.             Significant Labs: All pertinent labs within the past 24 hours have been reviewed.    Significant Imaging: I have reviewed all pertinent  imaging results/findings within the past 24 hours.

## 2024-06-17 NOTE — ANESTHESIA PREPROCEDURE EVALUATION
"                                                                                                             [facility]  Anesthesia Pre-Operative Evaluation         Patient Name: Dariel Urbina  YOB: 1940  MRN: [id number]    SUBJECTIVE:     Pre-operative evaluation for Procedure(s) (LRB):  CONTROL OF EPISTAXIS, POSTERIOR, USING NASAL PACKING OR CAUTERIZATION (Bilateral)     06/17/2024    Dariel Urbina is a 83 y.o. male w/ a significant PMHx of CAD s/p CABG, MR s/p mitraclib, Afib, Type 2 DM, CKD4, s/p mechanical aortic valve placement on warfarin anticoagulation w/ hx of recurrent epistaxis. He presents with recurrent epistaxis with a Hemoglobin 6.6 on arrival now s/p 3unit PRBC.    Prior history of ENT procedures "bilateral SP ligation followed by bilateral embolization, cautery on the left septum for persistent bleeding on 11/03/2023 and again recently on 12/18/23. He recently underwent left AEA ligation on 2/28/24. Most recently seen in clinic 5/21 and underwent L sided cautery."     Patient now presents for the above procedure(s).    Results for orders placed during the hospital encounter of 05/29/24    Echo    Interpretation Summary    Left Ventricle: The left ventricle is normal in size. Normal wall thickness. There is concentric hypertrophy. Septal motion is consistent with post-operative status. There is low normal systolic function with a visually estimated ejection fraction of 50 - 55%. Biplane (2D) method of discs ejection fraction is 51%.    Right Ventricle: Mild right ventricular enlargement. Wall thickness is normal. Systolic function is normal.    Left Atrium: Left atrium is severely dilated.    Right Atrium: Right atrium is severely dilated.    Aortic Valve: There is a mechanical valve in the aortic position. Aortic valve area by VTI is 0.87 cm². Aortic valve peak velocity is 3.02 m/s. Mean gradient is 19 mmHg. The dimensionless index is 0.31. There is trace " aortic regurgitation.    Mitral Valve: There is mild bileaflet sclerosis. There is moderate mitral annular calcification present. There is moderate to severe regurgitation with a centrally directed jet.    Tricuspid Valve: There is moderate regurgitation.    Pulmonary Artery: There is moderate pulmonary hypertension. The estimated pulmonary artery systolic pressure is 57 mmHg.    IVC/SVC: Intermediate venous pressure at 8 mmHg.      LDA:        Peripheral IV - Single Lumen 06/12/24 1423 20 G Anterior;Left Forearm (Active)   Site Assessment Clean;Dry;Intact 06/16/24 1920   Extremity Assessment Distal to IV No abnormal discoloration 06/16/24 1920   Line Status Saline locked 06/16/24 1920   Dressing Status Clean;Dry;Intact 06/16/24 1920   Dressing Intervention Integrity maintained 06/16/24 1920   Site Change Due 06/16/24 06/16/24 1920   Reason Not Rotated Other (Comment) 06/16/24 1920   Number of days: 4            Peripheral IV - Single Lumen 06/14/24 0011 22 G Anterior;Right Forearm (Active)   Site Assessment Clean;Dry;Intact 06/16/24 1920   Extremity Assessment Distal to IV No abnormal discoloration 06/16/24 1920   Line Status Saline locked 06/16/24 1920   Dressing Status Clean;Dry;Intact 06/16/24 1920   Dressing Intervention Integrity maintained 06/16/24 1920   Site Change Due 06/18/24 06/16/24 1920   Reason Not Rotated Not due 06/16/24 1920   Number of days: 3       Prev airway: 2/28/2024     Mask Ventilation:  Not attempted    Method of Intubation:  Video laryngoscopy    Blade:  Osorio 3    Laryngeal View Grade: Grade I - full view of cords      Difficult Airway Encountered?: No      Complications:  None    Placement Verified By:  Capnometry and Fiber optic visualization    Drips: None documented.      Patient Active Problem List   Diagnosis    Chronic anticoagulation    Hyperlipidemia associated with type 2 diabetes mellitus    History of colon resection    Renovascular hypertension    History of gout    Type 2  diabetes mellitus with stage 4 chronic kidney disease, without long-term current use of insulin    H/O mechanical aortic valve replacement    Atherosclerosis of native artery of extremity with intermittent claudication    Type 2 diabetes mellitus with diabetic peripheral angiopathy without gangrene    Bilateral carotid artery disease    Metabolic acidosis with normal anion gap and bicarbonate losses    NSTEMI (non-ST elevated myocardial infarction)    Epistaxis    Gastrointestinal hemorrhage associated with intestinal diverticulosis    Hx of colonic polyp    Hypertension associated with diabetes    Bilateral carpal tunnel syndrome    Numbness and tingling in both hands    Severe carpal tunnel syndrome of right wrist    Atherosclerosis of native coronary artery of native heart without angina pectoris    Anemia    Acute kidney injury superimposed on chronic kidney disease    Acute blood loss anemia    Recurrent epistaxis    Supratherapeutic INR    Coronary artery disease involving native coronary artery of native heart without angina pectoris    Paroxysmal atrial fibrillation    Mitral insufficiency    Aortic calcification    Dysphagia    Secondary hyperparathyroidism of renal origin    Dysphonia    Benign essential HTN    ACP (advance care planning)    Chronic kidney disease, stage 4 (severe)       Review of patient's allergies indicates:   Allergen Reactions    Lisinopril     Losartan      Intolerance- elevates potassium level       Current Inpatient Medications:   allopurinoL  100 mg Oral Daily    amoxicillin-clavulanate 500-125mg  1 tablet Oral BID    atorvastatin  80 mg Oral QHS    bumetanide  2 mg Oral Every other day    ferrous gluconate  324 mg Oral Daily with breakfast    metoprolol succinate  25 mg Oral Daily    sodium chloride  2 spray Each Nostril TID    warfarin  5 mg Oral Daily       Current Facility-Administered Medications on File Prior to Encounter   Medication Dose Route Frequency Provider Last Rate  Last Admin    ceFAZolin 2 g in dextrose 5 % in water (D5W) 50 mL IVPB (MB+)  2 g Intravenous On Call Procedure Yenifer Sandoval MD        HYDROmorphone injection 0.2 mg  0.2 mg Intravenous Q5 Min PRN Saeed Farrell MD        sodium chloride 0.9% flush 10 mL  10 mL Intravenous PRN Saeed Farrell MD         Current Outpatient Medications on File Prior to Encounter   Medication Sig Dispense Refill    acetaminophen (TYLENOL) 500 MG tablet Take 500 mg by mouth daily as needed for Pain.      allopurinoL (ZYLOPRIM) 100 MG tablet Take 1 tablet (100 mg total) by mouth once daily. 90 tablet 3    amLODIPine (NORVASC) 5 MG tablet Take 1 tablet (5 mg total) by mouth once daily. 90 tablet 3    bumetanide (BUMEX) 1 MG tablet Take 2 tablets (2 mg total) by mouth every other day.      ferrous gluconate (FERGON) 324 MG tablet TAKE 1 TABLET EVERY DAY WITH BREAKFAST 90 tablet 3    isosorbide mononitrate (IMDUR) 60 MG 24 hr tablet TAKE 1 TABLET EVERY EVENING 90 tablet 3    metoprolol succinate (TOPROL-XL) 25 MG 24 hr tablet Take 1 tablet (25 mg total) by mouth once daily. 90 tablet 3    rosuvastatin (CRESTOR) 40 MG Tab TAKE 1 TABLET EVERY EVENING. 90 tablet 3    sodium chloride (SALINE NASAL) 0.65 % nasal spray 2 sprays by Nasal route 3 (three) times daily. 44 mL 3    traZODone (DESYREL) 50 MG tablet Take 1 tablet (50 mg total) by mouth every evening. (Patient taking differently: Take 50 mg by mouth nightly as needed for Insomnia.) 90 tablet 2    warfarin (COUMADIN) 5 MG tablet TAKE 1/2 TABLET (2.5MG) SATURDAY, THEN TAKE 1 TABLET (5MG) ALL OTHER DAYS OR AS INSTRUCTED BY COUMADIN CLINIC (Patient taking differently: Take 5 mg by mouth every evening.) 90 tablet 3    omega-3 fatty acids/fish oil (FISH OIL-OMEGA-3 FATTY ACIDS) 300-1,000 mg capsule Take 1 capsule by mouth once daily.         Past Surgical History:   Procedure Laterality Date    BACK SURGERY      CARDIAC CATHETERIZATION      CARDIAC VALVE REPLACEMENT      CARDIAC VALVE  SURGERY      CARPAL TUNNEL RELEASE Right 05/19/2020    Procedure: RELEASE, CARPAL TUNNEL;  Surgeon: Rupesh Norris Jr., MD;  Location: [facility];  Service: Plastics;  Laterality: Right;    CATARACT EXTRACTION Left 11/13/2022        COLON SURGERY      COLONOSCOPY N/A 03/31/2017    Procedure: COLONOSCOPY;  Surgeon: Bruno Raymond MD;  Location: [facility] ENDO (4TH FLR);  Service: Endoscopy;  Laterality: N/A;  Patient's wife requesting date.    COLONOSCOPY N/A 04/03/2018    Procedure: COLONOSCOPY;  Surgeon: Bonifacio Pelletier MD;  Location: [facility] ENDO (2ND FLR);  Service: Endoscopy;  Laterality: N/A;    COLONOSCOPY N/A 08/13/2018    Procedure: COLONOSCOPY;  Surgeon: Kam Barba MD;  Location: [facility] ENDO (2ND FLR);  Service: Endoscopy;  Laterality: N/A;  2nd floor: PA pressure 49; hx of moderate-severe valve disease     per Coumadin clinic-Patient can hold 5 days with lovenox bridge       ok to schedule per Katarina    CORONARY ANGIOGRAPHY N/A 10/04/2021    Procedure: Left heart cath +/- peripheral angiogram;  Surgeon: Jose Ruiz MD;  Location: [facility] CATH LAB;  Service: Cardiology;  Laterality: N/A;    CORONARY ANGIOGRAPHY N/A 3/2/2023    Procedure: Angiogram, Coronary;  Surgeon: Devon Garnica MD;  Location: [facility] CATH LAB;  Service: Cardiology;  Laterality: N/A;    CORONARY ANGIOPLASTY      CORONARY ARTERY BYPASS GRAFT      CORONARY BYPASS GRAFT ANGIOGRAPHY  10/04/2021    Procedure: Bypass graft study;  Surgeon: Jose Ruiz MD;  Location: [facility] CATH LAB;  Service: Cardiology;;    CORONARY BYPASS GRAFT ANGIOGRAPHY  3/2/2023    Procedure: Bypass graft study;  Surgeon: Devon Garnica MD;  Location: [facility] CATH LAB;  Service: Cardiology;;    ECHOCARDIOGRAM,TRANSESOPHAGEAL N/A 4/14/2023    Procedure: Transesophageal echo (KIRSTEN) intra-procedure log documentation;  Surgeon: Thuan Diagnostic Provider;  Location: [facility] EP LAB;  Service: Cardiology;  Laterality: N/A;    ENDOSCOPIC NASAL CAUTERIZATION Bilateral  11/3/2023    Procedure: CAUTERIZATION, NOSE, ENDOSCOPIC;  Surgeon: Jono Russell MD;  Location: [facility] OR 2ND FLR;  Service: ENT;  Laterality: Bilateral;    ENDOSCOPIC NASAL CAUTERIZATION N/A 12/18/2023    Procedure: CAUTERIZATION, NOSE, ENDOSCOPIC;  Surgeon: Jono Russell MD;  Location: [facility] OR 2ND FLR;  Service: ENT;  Laterality: N/A;    EYE SURGERY      FESS, WITH IMAGING GUIDANCE Left 2/28/2024    Procedure: FESS, WITH IMAGING GUIDANCE;  Surgeon: Jono Russell MD;  Location: [facility] OR 2ND FLR;  Service: ENT;  Laterality: Left;  Scan loaded ENT [employer]. CL    FUNCTIONAL ENDOSCOPIC SINUS SURGERY (FESS) Bilateral 6/14/2023    Procedure: FESS (FUNCTIONAL ENDOSCOPIC SINUS SURGERY);  Surgeon: Jono Russell MD;  Location: [facility] OR 2ND FLR;  Service: ENT;  Laterality: Bilateral;    HERNIA REPAIR      INTRAOCULAR PROSTHESES INSERTION Left 11/13/2022    Procedure: INSERTION, IOL PROSTHESIS;  Surgeon: Alia Mckeon MD;  Location: [facility] OR 1ST FLR;  Service: Ophthalmology;  Laterality: Left;    INTRAOCULAR PROSTHESES INSERTION Right 12/04/2022    Procedure: INSERTION, IOL PROSTHESIS;  Surgeon: Alia Mckeon MD;  Location: [facility] OR 1ST FLR;  Service: Ophthalmology;  Laterality: Right;    LIGATION, ARTERY, ETHMOIDAL Left 2/28/2024    Procedure: LIGATION, ARTERY, ETHMOIDAL;  Surgeon: Jono Russell MD;  Location: [facility] OR 2ND FLR;  Service: ENT;  Laterality: Left;    LIGATION, ARTERY, SPHENOPALATINE, ENDOSCOPIC Bilateral 6/14/2023    Procedure: LIGATION, ARTERY, SPHENOPALATINE, ENDOSCOPIC;  Surgeon: Jono Russell MD;  Location: [facility] OR 2ND FLR;  Service: ENT;  Laterality: Bilateral;    PHACOEMULSIFICATION OF CATARACT Left 11/13/2022    Procedure: PHACOEMULSIFICATION, CATARACT;  Surgeon: Alia Mckeon MD;  Location: [facility] OR 1ST FLR;  Service: Ophthalmology;  Laterality: Left;    PHACOEMULSIFICATION OF CATARACT Right 12/04/2022    Procedure: PHACOEMULSIFICATION, CATARACT;  Surgeon: Alia Mckeon  MD;  [facility];  Service: Ophthalmology;  Laterality: Right;    SPINE SURGERY      TRANSESOPHAGEAL ECHOCARDIOGRAPHY N/A 3/22/2023    Procedure: ECHOCARDIOGRAM, TRANSESOPHAGEAL;  Surgeon: [facility] Diagnostic Provider;  Location: [facility] EP LAB;  Service: Anesthesiology;  Laterality: N/A;    TRANSESOPHAGEAL ECHOCARDIOGRAPHY N/A 2023    Procedure: ECHOCARDIOGRAM, TRANSESOPHAGEAL;  Surgeon: Dosc Diagnostic Provider;  Location: [facility] EP LAB;  Service: Anesthesiology;  Laterality: N/A;    VASECTOMY         Social History     Socioeconomic History    Marital status:      Spouse name: Ameena    Number of children: 3   Occupational History    Occupation: retired   Tobacco Use    Smoking status: Former     Current packs/day: 0.00     Average packs/day: 1 pack/day for 20.0 years (20.0 ttl pk-yrs)     Types: Cigarettes     Start date: 1960     Quit date: 1980     Years since quittin.7     Passive exposure: Never    Smokeless tobacco: Never   Substance and Sexual Activity    Alcohol use: No    Drug use: No    Sexual activity: Not Currently     Partners: Female     Social Determinants of Health     Financial Resource Strain: Low Risk  (2024)    Overall Financial Resource Strain (CARDIA)     Difficulty of Paying Living Expenses: Not hard at all   Food Insecurity: No Food Insecurity (2024)    Hunger Vital Sign     Worried About Running Out of Food in the Last Year: Never true     Ran Out of Food in the Last Year: Never true   Transportation Needs: No Transportation Needs (2024)    TRANSPORTATION NEEDS     Transportation : No   Physical Activity: Inactive (2024)    Exercise Vital Sign     Days of Exercise per Week: 0 days     Minutes of Exercise per Session: 0 min   Stress: No Stress Concern Present (2024)    [employer] - Occupational Stress Questionnaire     Feeling of Stress : Only a little   Housing Stability: Low Risk  (2024)    Housing  Stability Vital Sign     Unable to Pay for Housing in the Last Year: No     Homeless in the Last Year: No       OBJECTIVE:     Vital Signs Range (Last 24H):  Temp:  [36.3 °C (97.3 °F)-36.8 °C (98.2 °F)]   Pulse:  [42-69]   Resp:  [16-18]   BP: (110-154)/(48-82)   SpO2:  [93 %-99 %]       Significant Labs:  Lab Results   Component Value Date    WBC 5.76 06/17/2024    HGB 9.1 (L) 06/17/2024    HCT 28.1 (L) 06/17/2024     06/17/2024    CHOL 96 (L) 05/29/2024    TRIG 144 05/29/2024    HDL 31 (L) 05/29/2024    ALT 15 06/12/2024    AST 16 06/12/2024     06/15/2024    K 3.9 06/15/2024     06/15/2024    CREATININE 2.0 (H) 06/15/2024    BUN 64 (H) 06/15/2024    CO2 22 (L) 06/15/2024    TSH 3.705 09/29/2022    PSA 0.35 07/27/2012    INR 1.1 06/17/2024    HGBA1C 5.3 05/30/2024       Diagnostic Studies: No relevant studies.    EKG:   Results for orders placed or performed during the hospital encounter of 05/29/24   EKG 12-lead    Collection Time: 05/31/24  9:14 AM   Result Value Ref Range    QRS Duration 170 ms    [facility] QTC Calculation 495 ms    Narrative    Test Reason : R07.9,    Vent. Rate : 074 BPM     Atrial Rate : 000 BPM     P-R Int : 000 ms          QRS Dur : 170 ms      QT Int : 446 ms       P-R-T Axes : 000 040 161 degrees     QTc Int : 495 ms    Atrial fibrillation  Left bundle branch block  Abnormal ECG  When compared with ECG of 29-MAY-2024 18:52,  No significant change was found  Confirmed by Jeremias APARICIO MD ([id number]) on 5/31/2024 10:29:15 AM    Referred By: [email or web address]   SELF           Confirmed By:Jeremias APARICIO MD       2D ECHO:  TTE:  Results for orders placed or performed during the hospital encounter of 05/29/24   Echo   Result Value Ref Range    RA Width 4.96 cm    LA volume (mod) 144.20 cm3    Left Atrium Major Axis 7.26 cm    Left Atrium Minor Axis 7.31 cm    RA Major Axis 6.30 cm    LV Diastolic Volume 111.77 mL    LV Systolic Volume 54.97 mL    PV Peak D Ashkan 1.04 m/s    PV Peak S Ashkan 0.24 m/s     MV stenosis pressure 1/2 time 138.38 ms    MV VTI 16.87 cm    TR Max Ashkan 3.5 m/s    MV Peak E Ashkan 1.62 m/s    MV peak gradient 2 mmHg    Mr max ashkan 0.05 m/s    Ao VTI 66.81 cm    Ao peak ashkan 3.02 m/s    LVOT peak VTI 20.50 cm    LVOT peak ashkan 0.80 m/s    LVOT diameter 1.90 cm    IVRT 45.67 msec    E wave deceleration time 477.16 msec    MV mean gradient 0 mmHg    AV mean gradient 19 mmHg    TAPSE 1.87 cm    RVDD 4.48 cm    LA size 5.74 cm    Ascending aorta 3.3 cm    STJ 2.53 cm    Sinus 2.73 cm    LVIDs 3.61 2.1 - 4.0 cm    Posterior Wall 1.41 (A) 0.6 - 1.1 cm    IVS 0.99 0.6 - 1.1 cm    LVIDd 4.88 3.5 - 6.0 cm    TDI LATERAL 0.08 m/s    LA WIDTH 5.57 cm    TDI SEPTAL 0.08 m/s    LV LATERAL E/E' RATIO 20.25 m/s    LV SEPTAL E/E' RATIO 20.25 m/s    RV/LV Ratio 0.92 cm    FS 26 (A) 28 - 44 %    LA volume 197.98 cm3    LV mass 224.92 g    ZLVIDD -0.59     ZLVIDS 0.97     Left Ventricle Relative Wall Thickness 0.58 cm    AV valve area 0.87 cm²    AV Velocity Ratio 0.26     AV index (prosthetic) 0.31     MV valve area p 1/2 method 1.59 cm2    MV valve area by continuity eq 3.44 cm2    Mean e' 0.08 m/s    Pulm vein S/D ratio 0.23     LVOT area 2.8 cm2    LVOT stroke volume 58.09 cm3    AV peak gradient 36 mmHg    E/E' ratio 20.25 m/s    LV Systolic Volume Index 29.6 mL/m2    LV Diastolic Volume Index 60.09 mL/m2    LA Volume Index 106.4 mL/m2    LV Mass Index 121 g/m2    Triscuspid Valve Regurgitation Peak Gradient 49 mmHg    LA Volume Index (Mod) 77.5 mL/m2    MATIAS by Velocity Ratio 0.75 cm²    BSA 1.89 m2    TV resting pulmonary artery pressure 57 mmHg    RV TB RVSP 12 mmHg    Est. RA pres 8 mmHg    Almeida's Biplane MOD Ejection Fraction 51 %    Narrative      Left Ventricle: The left ventricle is normal in size. Normal wall   thickness. There is concentric hypertrophy. Septal motion is consistent   with post-operative status. There is low normal systolic function with a   visually estimated ejection fraction of 50 -  55%. Biplane (2D) method of   discs ejection fraction is 51%.    Right Ventricle: Mild right ventricular enlargement. Wall thickness is   normal. Systolic function is normal.    Left Atrium: Left atrium is severely dilated.    Right Atrium: Right atrium is severely dilated.    Aortic Valve: There is a mechanical valve in the aortic position.   Aortic valve area by VTI is 0.87 cm². Aortic valve peak velocity is 3.02   m/s. Mean gradient is 19 mmHg. The dimensionless index is 0.31. There is   trace aortic regurgitation.    Mitral Valve: There is mild bileaflet sclerosis. There is moderate   mitral annular calcification present. There is moderate to severe   regurgitation with a centrally directed jet.    Tricuspid Valve: There is moderate regurgitation.    Pulmonary Artery: There is moderate pulmonary hypertension. The   estimated pulmonary artery systolic pressure is 57 mmHg.    IVC/SVC: Intermediate venous pressure at 8 mmHg.         KIRSTEN:  Results for orders placed or performed during the hospital encounter of 04/11/23   Transesophageal echo (KIRSTEN)   Result Value Ref Range    BSA 1.84 m2    EF 60 %    Narrative    · The left ventricle is normal in size with normal systolic function.The   estimated ejection fraction is 60%.  · Normal right ventricular size with normal right ventricular systolic   function.  · Moderate mitral regurgitation.  · There is a mechanical aortic valve present. There is no aortic   insufficiency present.  · Plaque present in the transverse aorta.          ASSESSMENT/PLAN:           Pre-op Assessment    I have reviewed the Patient Summary Reports.     I have reviewed the Nursing Notes.    I have reviewed the Medications.     Review of Systems  Anesthesia Hx:  No problems with previous Anesthesia   History of prior surgery of interest to airway management or planning: heart surgery.         Denies Family Hx of Anesthesia complications.    Denies Personal Hx of Anesthesia complications.                     Social:  Former Smoker       Hematology/Oncology:       -- Anemia:                                  Cardiovascular:     Hypertension Valvular problems/Murmurs, MR  CAD   CABG/stent Dysrhythmias atrial fibrillation   Denies CHF.    hyperlipidemia                             Pulmonary:    Denies COPD.  Denies Asthma.                    Renal/:  Chronic Renal Disease, CKD                Hepatic/GI:      Denies GERD.             Musculoskeletal:  Denies Arthritis.               Neurological:    Denies CVA.    Denies Seizures.                                Endocrine:  Diabetes, type 2           Psych:  Denies Psychiatric History.                  Physical Exam  General: Well nourished, Cooperative, Alert and Oriented  Nasal packing in place  Airway:  Mallampati: II / I  Mouth Opening: Normal  TM Distance: Normal  Tongue: Normal  Neck ROM: Normal ROM    Dental:  Partial Dentures, Periodontal disease  Only has 4 remaining teeth on bottom.      Anesthesia Plan  Type of Anesthesia, risks & benefits discussed:    Anesthesia Type: Gen ETT  Intra-op Monitoring Plan: Standard ASA Monitors  Post Op Pain Control Plan: multimodal analgesia and IV/PO Opioids PRN  Induction:  IV  Airway Plan: Direct and Video, Post-Induction  Informed Consent: Informed consent signed with the Patient and all parties understand the risks and agree with anesthesia plan.  All questions answered.   ASA Score: 3  Day of Surgery Review of History & Physical: H&P Update referred to the surgeon/provider.    Ready For Surgery From Anesthesia Perspective.     .

## 2024-06-17 NOTE — CONSULTS
PHARMACY CONSULT NOTE: WARFARIN  Dariel Urbina is a 83 y.o. male on warfarin therapy for  AF and mechanical aortic valve . PharmD has been consulted for warfarin dosing.    Current order: 5 mg daily  Home dose: 5 mg daily  Coumadin clinic enrollment: Active  INR goal: 2-3    Lab Results   Component Value Date    INR 1.1 06/17/2024    INR 1.1 06/16/2024    INR 1.2 06/15/2024     Significant drug interactions: allopurinol, augmentin  Diet: Adult Regular with Boost supplements     Recommendation(s):   Patient initially presented w/ epistaxis and elevated INR - reviewing coumadin clinic notes shows patient has been on 5 mg daily and end of May INR was 7.4 on the same regimen  Considering INR remains subtherapeutic okay to c/w 5 mg daily for now - its important that the dose is decreased once the INR starts to trend up. Even more important to not discharge the patient on 5 mg daily  C/w daily INR labs  Pharmacy will continue to follow and monitor warfarin    Thank you for the consult,  Teddy Whittington, PharmD, [facility]   Ext. [phone]    **Note: Consults are reviewed Monday-Friday 7:00am-3:30pm. THE ABOVE RECOMMENDATIONS ARE ONLY SUGGESTED.THE RECOMMENDATIONS SHOULD BE CONSIDERED IN CONJUNCTION WITH ALL PATIENT FACTORS. **

## 2024-06-17 NOTE — PLAN OF CARE
Care provided to patient as per POC and as charted in flowsheet.  No events overnight.   Problem: Adult Inpatient Plan of Care  Goal: Plan of Care Review  Outcome: Progressing  Goal: Patient-Specific Goal (Individualized)  Outcome: Progressing  Goal: Absence of Hospital-Acquired Illness or Injury  Outcome: Progressing  Goal: Optimal Comfort and Wellbeing  Outcome: Progressing  Goal: Readiness for Transition of Care  Outcome: Progressing     Problem: Infection  Goal: Absence of Infection Signs and Symptoms  Outcome: Progressing     Problem: Diabetes Comorbidity  Goal: Blood Glucose Level Within Targeted Range  Outcome: Progressing     Problem: Acute Kidney Injury/Impairment  Goal: Fluid and Electrolyte Balance  Outcome: Progressing  Goal: Improved Oral Intake  Outcome: Progressing  Goal: Effective Renal Function  Outcome: Progressing     Problem: Fall Injury Risk  Goal: Absence of Fall and Fall-Related Injury  Outcome: Progressing     Problem: Pain Acute  Goal: Optimal Pain Control and Function  Outcome: Progressing     Problem: Anemia  Goal: Anemia Symptom Improvement  Outcome: Progressing

## 2024-06-17 NOTE — PROGRESS NOTES
[facility] Medicine  Progress Note    Patient Name: Dariel Urbina  MRN: [id number]  Patient Class: IP- Inpatient   Admission Date: 6/12/2024  Length of Stay: 4 days  Attending Physician: Aga Hernandez MD  Primary Care Provider: Devon Langston Jr., MD        Subjective:     Principal Problem:Epistaxis        HPI:  82 y/o WM CAD s/p CABG, MR s/p mitraclib, Afib, Type 2 DM, CKD4,  s/p mechanical aortic valve placement on warfarin anticoagulation w/ hx of recurrent epistaxis presents to the ED for epistaxis.  He reports painless bleeding from left nare onset @ 0400 today.  He did not want to come to the hospital, but wife advised him to.  He is found with supratherapeutic INR 4.4.  His last dose of warfarin was yesterday.      ENT has placed rhino rocket to left nare. Consult note pending.  Patient reports its always left nare that has recurrent bleeding. He has Hemoglobin 6.6 and 1unit PRBC ordered in ED and infusing during my interview.      Pt has no complaints of chest pain/dyspnea, no nausea/vomiting, no hematemesis, hematuria, or other sites of bruising or bleeding.     He was recently admitted to [facility] for same reason (5/29-6/3), INR was >8 last admission. Required PRBC transfusion.  Hospital course was complicated by JIM on CKD and acute on chronic heart failure exacerbation.     Overview/Hospital Course:  No notes on file    Interval History:   6/17: continue to monitor for signs of bleeding    Review of Systems   Respiratory:  Negative for chest tightness and shortness of breath.    Cardiovascular:  Negative for chest pain.   Gastrointestinal: Negative.    Genitourinary: Negative.    Psychiatric/Behavioral:  Negative for confusion.      Objective:     Vital Signs (Most Recent):  Temp: 99.3 °F (37.4 °C) (06/17/24 1449)  Pulse: 71 (06/17/24 1438)  Resp: 18 (06/17/24 1127)  BP: (!) 135/58 (06/17/24 1127)  SpO2: (!) 93 % (06/17/24 1127) Vital Signs (24h Range):  Temp:  [97.3 °F (36.3  °C)-99.3 °F (37.4 °C)] 99.3 °F (37.4 °C)  Pulse:  [42-71] 71  Resp:  [16-18] 18  SpO2:  [93 %-98 %] 93 %  BP: (110-146)/(48-82) 135/58     Weight: 69.9 kg (154 lb)  Body mass index is 25.63 kg/m².    Intake/Output Summary (Last 24 hours) at 2024 1457  Last data filed at 2024 0029  Gross per 24 hour   Intake 360 ml   Output --   Net 360 ml         Physical Exam  Vitals and nursing note reviewed.   Constitutional:       General: He is not in acute distress.     Appearance: He is not diaphoretic.   HENT:      Head: Normocephalic and atraumatic.      Nose:      Comments: Left nare rhino-rocket  Eyes:      General: No scleral icterus.  Cardiovascular:      Rate and Rhythm: Regular rhythm. Bradycardia present.      Comments: Mechanical click  Pulmonary:      Effort: Pulmonary effort is normal. No respiratory distress.      Breath sounds: No wheezing or rales.   Abdominal:      General: Bowel sounds are normal.      Palpations: Abdomen is soft.   Musculoskeletal:      Right lower leg: No edema.      Left lower le+ Pitting Edema present.   Skin:     General: Skin is warm and dry.   Neurological:      General: No focal deficit present.      Mental Status: He is alert and oriented to person, place, and time.             Significant Labs: All pertinent labs within the past 24 hours have been reviewed.    Significant Imaging: I have reviewed all pertinent imaging results/findings within the past 24 hours.    Assessment/Plan:      * Epistaxis  -recurrent left nare bleeding with supratherapeutic INR causing acute blood loss anemia.    -s/p Rhinorocket placement in ED  - w/ afrin spray and tranexamic acid intranasally   -Consult ENT in AM     -per last ENT inpatient consult recs   Avoid use of nasal cannula if any hypoxia develops - use only humidified o2 via face mask/face tent  For recurrent bleeding - given presence of rhino rocket - would contact ENT on-call  Prior ENT notes have mentioned possible need for  "angiography and embolization with IR.       Prior history of ENT procedures "bilateral SP ligation followed by bilateral embolization, cautery on the left septum for persistent bleeding on 11/03/2023 and again recently on 12/18/23. He recently underwent left AEA ligation on 2/28/24. Most recently seen in clinic 5/21 and underwent L sided cautery."    Coronary artery disease involving native coronary artery of native heart without angina pectoris  Patient with known CAD s/p CABG, which is controlled Will continue  beta blocker, ASA, and Statin and monitor for S/Sx of angina/ACS. Continue to monitor on telemetry.     HOLD IMDUR with blood loss anemia - resume when appropriate    Acute blood loss anemia  -secondary to epistaxis  -receiving 1unit PRBC this evening  -Check CBC every 6 hours and INR every 12 hours   -given hemostasis - last dose of warfarin on Tuesday.  PharmD consult to assist with management.    >will hold any FFP or vitamin K now - will f/u midnight INR if rising or not downtrending - will reconsider. Reversal vs lowering INR therapeies. 21.    H/O mechanical aortic valve replacement  As per chronic anticoagulation      Type 2 diabetes mellitus with stage 4 chronic kidney disease, without long-term current use of insulin  Trend chemistry panel  -glucose monitoring   -PRN insulin    Chronic anticoagulation  INR goal is 2.0-3.0   -last dose warfarin Tuesday  -q12 INR checks while supratherapeutic  -pharmD consult        VTE Risk Mitigation (From admission, onward)           Ordered     warfarin (COUMADIN) tablet 5 mg  Daily         06/15/24 1621     Reason for No Pharmacological VTE Prophylaxis  Once        Comments: Supratherapeutic inr   Question:  Reasons:  Answer:  Physician Provided (leave comment)    06/12/24 2221     IP VTE HIGH RISK PATIENT  Once         06/12/24 2221     Place sequential compression device  Until discontinued         06/12/24 2221                    Discharge Planning   ASTER: " 6/18/2024     Code Status: Full Code   Is the patient medically ready for discharge?:     Reason for patient still in hospital (select all that apply): Patient trending condition  Discharge Plan A: Home with family   Discharge Delays: None known at this time              Aga Hernandez MD  Department of Hospital Medicine   [facility] Surg

## 2024-06-17 NOTE — PROGRESS NOTES
[facility] Surg  Otorhinolaryngology-Head & Neck Surgery  Progress Note    Subjective:     Post-Op Info:  Procedure(s) (LRB):  CONTROL OF EPISTAXIS, POSTERIOR, USING NASAL PACKING OR CAUTERIZATION (Bilateral)      Hospital Day: 6     No new subjective & objective note has been filed under this hospital service since the last note was generated.    Assessment/Plan:     Recurrent epistaxis  83M w extensive PMH, known to ENT service for refractory epistaxis status post bilateral SP ligation followed by bilateral embolization, cautery on the left septum for persistent bleeding on 11/03/2023 and again recently on 12/18/23. He recently underwent left AEA ligation on 2/28/24 and nasal cautery on 5/21. Presents to ED w further bleeding, rhinorocket placed and remains hemostatic. Supratherapeutic INR.    Packed with right merocel and inflated left rhino. No further bleeding.     Plan to take to OR tomorrow for removal of nasal packing and nasal endoscopy w cautery w Dr. Russell 6/17/24. Consent obtained and placed in chart  Please make NPO at midnight, hold AC  Please page w questions or concerns        Cooper Ghotra MD  Otorhinolaryngology-Head & Neck Surgery  [facility] Surg

## 2024-06-17 NOTE — PROGRESS NOTES
[facility]  Otorhinolaryngology-Head & Neck Surgery  Progress Note    Subjective:     Post-Op Info:  Procedure(s) (LRB):  CONTROL OF EPISTAXIS, POSTERIOR, USING NASAL PACKING OR CAUTERIZATION (Bilateral)      Hospital Day: 6     Interval History: NAEON. AFVSS. No further epistaxis.     Medications:  Continuous Infusions:  Scheduled Meds:   allopurinoL  100 mg Oral Daily    amoxicillin-clavulanate 500-125mg  1 tablet Oral BID    atorvastatin  80 mg Oral QHS    bumetanide  2 mg Oral Every other day    ferrous gluconate  324 mg Oral Daily with breakfast    metoprolol succinate  25 mg Oral Daily    sodium chloride  2 spray Each Nostril TID    warfarin  5 mg Oral Daily     PRN Meds:  Current Facility-Administered Medications:     0.9%  NaCl infusion (for blood administration), , Intravenous, Q24H PRN    0.9%  NaCl infusion (for blood administration), , Intravenous, Q24H PRN    acetaminophen, 650 mg, Oral, Q4H PRN    aluminum-magnesium hydroxide-simethicone, 30 mL, Oral, QID PRN    dextrose 10%, 12.5 g, Intravenous, PRN    dextrose 10%, 25 g, Intravenous, PRN    glucagon (human recombinant), 1 mg, Intramuscular, PRN    glucose, 16 g, Oral, PRN    glucose, 24 g, Oral, PRN    insulin aspart U-100, 0-5 Units, Subcutaneous, QID (AC + HS) PRN    naloxone, 0.02 mg, Intravenous, PRN    ondansetron, 4 mg, Intravenous, Q8H PRN    oxyCODONE, 5 mg, Oral, Q6H PRN    oxyCODONE-acetaminophen, 2 tablet, Oral, Q6H PRN    polyethylene glycol, 17 g, Oral, BID PRN    prochlorperazine, 5 mg, Intravenous, Q6H PRN    senna-docusate 8.6-50 mg, 1 tablet, Oral, Daily PRN    sodium chloride 0.9%, 10 mL, Intravenous, Q6H PRN    traZODone, 50 mg, Oral, Nightly PRN     Review of patient's allergies indicates:   Allergen Reactions    Lisinopril     Losartan      Intolerance- elevates potassium level     Objective:     Vital Signs (24h Range):  Temp:  [97.3 °F (36.3 °C)-99.3 °F (37.4 °C)] 99.3 °F (37.4 °C)  Pulse:  [42-71] 71  Resp:  [16-18]  18  SpO2:  [93 %-98 %] 93 %  BP: (110-146)/(48-82) 135/58     Date 06/17/24 0700 - 06/18/24 0659   Shift 4958-0990 3744-4888 7525-6508 24 Hour Total   INTAKE   P.O. 720   720   Shift Total(mL/kg) 720(10.3)   720(10.3)   OUTPUT   Shift Total(mL/kg)       Weight (kg) 69.9 69.9 69.9 69.9     Lines/Drains/Airways       Peripheral Intravenous Line  Duration                  Peripheral IV - Single Lumen 06/12/24 1423 20 G Anterior;Left Forearm 5 days         Peripheral IV - Single Lumen 06/14/24 0011 22 G Anterior;Right Forearm 3 days                     Physical Exam  NAD  NCAT  L rhinorocket and R merocell in place, no epistaxis  Breathing well on RA     Significant Labs:  BMP:   Recent Labs   Lab 06/15/24  0342   GLU 87      CO2 22*   BUN 64*   CREATININE 2.0*   CALCIUM 10.1   MG 1.8     CBC:   Recent Labs   Lab 06/17/24  1143   WBC 5.80   RBC 3.04*   HGB 8.9*   HCT 28.2*      MCV 93   MCH 29.3   MCHC 31.6*     Coagulation:   Recent Labs   Lab 06/17/24  0509   LABPROT 12.2   INR 1.1       Significant Diagnostics:  I have reviewed and interpreted all pertinent imaging results/findings within the past 24 hours.  Assessment/Plan:     Recurrent epistaxis  83M w extensive PMH, known to ENT service for refractory epistaxis status post bilateral SP ligation followed by bilateral embolization, cautery on the left septum for persistent bleeding on 11/03/2023 and again recently on 12/18/23. He recently underwent left AEA ligation on 2/28/24 and nasal cautery on 5/21. Presents to ED w further bleeding, rhinorocket placed and remains hemostatic. Supratherapeutic INR.    Packed with right merocel and inflated left rhino. No further bleeding.     Plan to take to OR tomorrow for removal of nasal packing and nasal endoscopy w cautery w Dr. Russell 6/17/24. Consent obtained and placed in chart  Please make NPO at midnight, hold AC  Please page w questions or concerns        Cooper Ghotra MD  Otorhinolaryngology-Head  & Neck Surgery  [facility] Surg

## 2024-06-17 NOTE — ASSESSMENT & PLAN NOTE
83M w extensive PMH, known to ENT service for refractory epistaxis status post bilateral SP ligation followed by bilateral embolization, cautery on the left septum for persistent bleeding on 11/03/2023 and again recently on 12/18/23. He recently underwent left AEA ligation on 2/28/24 and nasal cautery on 5/21. Presents to ED w further bleeding, rhinorocket placed and remains hemostatic. Supratherapeutic INR.    Packed with right merocel and inflated left rhino. No further bleeding.     Plan to take to OR tomorrow for removal of nasal packing and nasal endoscopy w cautery w Dr. Russell 6/17/24. Consent obtained and placed in chart  Please make NPO at midnight, hold AC  Please page w questions or concerns

## 2024-06-17 NOTE — PLAN OF CARE
[facility]  Discharge Reassessment    Per MD team, patient is not medically ready for d/c at this time. Patient is scheduled for CONTROL OF EPISTAXIS, POSTERIOR, USING NASAL PACKING OR CAUTERIZATION procedure tomorrow, 6/18/24. Plan is to d/c to home w/family when ready. Will continue to follow for needs.    Primary Care Provider: Devon Langston Jr., MD    Expected Discharge Date: 6/18/2024    Reassessment (most recent)       Discharge Reassessment - 06/17/24 1135          Discharge Reassessment    Assessment Type Discharge Planning Reassessment (P)      Did the patient's condition or plan change since previous assessment? No (P)      Discharge Plan discussed with: Patient (P)      Communicated ASTER with patient/caregiver Yes (P)      Discharge Plan A Home with family (P)      Discharge Plan B Home with family;Home Health (P)      DME Needed Upon Discharge  other (see comments) (P)    TBD    Transition of Care Barriers None (P)      Why the patient remains in the hospital Requires continued medical care (P)         Post-Acute Status    Post-Acute Authorization Other (P)      Coverage [employer] MANAGED MEDICARE - [employer] MEDICARE HMO - (P)      Other Status No Post-Acute Service Needs (P)      Discharge Delays None known at this time (P)                    Discharge Plan A and Plan B have been determined by review of patient's clinical status, future medical and therapeutic needs, and coverage/benefits for post-acute care in coordination with multidisciplinary team members.    Juli Romano, ASHLEY, LMSW    Case Management Department  [facility]

## 2024-06-17 NOTE — SUBJECTIVE & OBJECTIVE
Interval History: NAEON. AFVSS. No further epistaxis.     Medications:  Continuous Infusions:  Scheduled Meds:   allopurinoL  100 mg Oral Daily    amoxicillin-clavulanate 500-125mg  1 tablet Oral BID    atorvastatin  80 mg Oral QHS    bumetanide  2 mg Oral Every other day    ferrous gluconate  324 mg Oral Daily with breakfast    metoprolol succinate  25 mg Oral Daily    sodium chloride  2 spray Each Nostril TID    warfarin  5 mg Oral Daily     PRN Meds:  Current Facility-Administered Medications:     0.9%  NaCl infusion (for blood administration), , Intravenous, Q24H PRN    0.9%  NaCl infusion (for blood administration), , Intravenous, Q24H PRN    acetaminophen, 650 mg, Oral, Q4H PRN    aluminum-magnesium hydroxide-simethicone, 30 mL, Oral, QID PRN    dextrose 10%, 12.5 g, Intravenous, PRN    dextrose 10%, 25 g, Intravenous, PRN    glucagon (human recombinant), 1 mg, Intramuscular, PRN    glucose, 16 g, Oral, PRN    glucose, 24 g, Oral, PRN    insulin aspart U-100, 0-5 Units, Subcutaneous, QID (AC + HS) PRN    naloxone, 0.02 mg, Intravenous, PRN    ondansetron, 4 mg, Intravenous, Q8H PRN    oxyCODONE, 5 mg, Oral, Q6H PRN    oxyCODONE-acetaminophen, 2 tablet, Oral, Q6H PRN    polyethylene glycol, 17 g, Oral, BID PRN    prochlorperazine, 5 mg, Intravenous, Q6H PRN    senna-docusate 8.6-50 mg, 1 tablet, Oral, Daily PRN    sodium chloride 0.9%, 10 mL, Intravenous, Q6H PRN    traZODone, 50 mg, Oral, Nightly PRN     Review of patient's allergies indicates:   Allergen Reactions    Lisinopril     Losartan      Intolerance- elevates potassium level     Objective:     Vital Signs (24h Range):  Temp:  [97.3 °F (36.3 °C)-99.3 °F (37.4 °C)] 99.3 °F (37.4 °C)  Pulse:  [42-71] 71  Resp:  [16-18] 18  SpO2:  [93 %-98 %] 93 %  BP: (110-146)/(48-82) 135/58     Date 06/17/24 0700 - 06/18/24 0659   Shift 1221-5780 4674-2877 1670-6263 24 Hour Total   INTAKE   P.O. 720   720   Shift Total(mL/kg) 720(10.3)   720(10.3)   OUTPUT   Shift  Total(mL/kg)       Weight (kg) 69.9 69.9 69.9 69.9     Lines/Drains/Airways       Peripheral Intravenous Line  Duration                  Peripheral IV - Single Lumen 06/12/24 1423 20 G Anterior;Left Forearm 5 days         Peripheral IV - Single Lumen 06/14/24 0011 22 G Anterior;Right Forearm 3 days                     Physical Exam  NAD  NCAT  L rhinorocket and R merocell in place, no epistaxis  Breathing well on RA     Significant Labs:  BMP:   Recent Labs   Lab 06/15/24  0342   GLU 87      CO2 22*   BUN 64*   CREATININE 2.0*   CALCIUM 10.1   MG 1.8     CBC:   Recent Labs   Lab 06/17/24  1143   WBC 5.80   RBC 3.04*   HGB 8.9*   HCT 28.2*      MCV 93   MCH 29.3   MCHC 31.6*     Coagulation:   Recent Labs   Lab 06/17/24  0509   LABPROT 12.2   INR 1.1       Significant Diagnostics:  I have reviewed and interpreted all pertinent imaging results/findings within the past 24 hours.

## 2024-06-18 ENCOUNTER — ANESTHESIA (OUTPATIENT)
Dept: SURGERY | Facility: HOSPITAL | Age: 84
End: 2024-06-18
Payer: MEDICARE

## 2024-06-18 LAB
BASOPHILS # BLD AUTO: 0.01 K/UL (ref 0–0.2)
BASOPHILS NFR BLD: 0.2 % (ref 0–1.9)
DIFFERENTIAL METHOD BLD: ABNORMAL
EOSINOPHIL # BLD AUTO: 0 K/UL (ref 0–0.5)
EOSINOPHIL NFR BLD: 0 % (ref 0–8)
ERYTHROCYTE [DISTWIDTH] IN BLOOD BY AUTOMATED COUNT: 17.6 % (ref 11.5–14.5)
ERYTHROCYTE [DISTWIDTH] IN BLOOD BY AUTOMATED COUNT: 17.7 % (ref 11.5–14.5)
ERYTHROCYTE [DISTWIDTH] IN BLOOD BY AUTOMATED COUNT: 17.7 % (ref 11.5–14.5)
HCT VFR BLD AUTO: 27.2 % (ref 40–54)
HCT VFR BLD AUTO: 29 % (ref 40–54)
HCT VFR BLD AUTO: 29.5 % (ref 40–54)
HCT VFR BLD AUTO: 29.8 % (ref 40–54)
HCT VFR BLD AUTO: 32.6 % (ref 40–54)
HGB BLD-MCNC: 10.2 G/DL (ref 14–18)
HGB BLD-MCNC: 8.8 G/DL (ref 14–18)
HGB BLD-MCNC: 9.2 G/DL (ref 14–18)
HGB BLD-MCNC: 9.4 G/DL (ref 14–18)
HGB BLD-MCNC: 9.4 G/DL (ref 14–18)
IMM GRANULOCYTES # BLD AUTO: 0.02 K/UL (ref 0–0.04)
IMM GRANULOCYTES NFR BLD AUTO: 0.4 % (ref 0–0.5)
INR PPP: 1.2 (ref 0.8–1.2)
LYMPHOCYTES # BLD AUTO: 0.4 K/UL (ref 1–4.8)
LYMPHOCYTES NFR BLD: 7.4 % (ref 18–48)
MCH RBC QN AUTO: 29.2 PG (ref 27–31)
MCH RBC QN AUTO: 29.4 PG (ref 27–31)
MCH RBC QN AUTO: 29.4 PG (ref 27–31)
MCH RBC QN AUTO: 29.7 PG (ref 27–31)
MCH RBC QN AUTO: 29.8 PG (ref 27–31)
MCHC RBC AUTO-ENTMCNC: 31.3 G/DL (ref 32–36)
MCHC RBC AUTO-ENTMCNC: 31.5 G/DL (ref 32–36)
MCHC RBC AUTO-ENTMCNC: 31.7 G/DL (ref 32–36)
MCHC RBC AUTO-ENTMCNC: 31.9 G/DL (ref 32–36)
MCHC RBC AUTO-ENTMCNC: 32.4 G/DL (ref 32–36)
MCV RBC AUTO: 92 FL (ref 82–98)
MCV RBC AUTO: 92 FL (ref 82–98)
MCV RBC AUTO: 93 FL (ref 82–98)
MCV RBC AUTO: 94 FL (ref 82–98)
MCV RBC AUTO: 94 FL (ref 82–98)
MONOCYTES # BLD AUTO: 0.2 K/UL (ref 0.3–1)
MONOCYTES NFR BLD: 3.2 % (ref 4–15)
NEUTROPHILS # BLD AUTO: 5.1 K/UL (ref 1.8–7.7)
NEUTROPHILS NFR BLD: 88.8 % (ref 38–73)
NRBC BLD-RTO: 0 /100 WBC
PLATELET # BLD AUTO: 157 K/UL (ref 150–450)
PLATELET # BLD AUTO: 157 K/UL (ref 150–450)
PLATELET # BLD AUTO: 163 K/UL (ref 150–450)
PLATELET # BLD AUTO: 180 K/UL (ref 150–450)
PLATELET # BLD AUTO: 185 K/UL (ref 150–450)
PMV BLD AUTO: 10.2 FL (ref 9.2–12.9)
PMV BLD AUTO: 10.4 FL (ref 9.2–12.9)
PMV BLD AUTO: 10.8 FL (ref 9.2–12.9)
PMV BLD AUTO: 11 FL (ref 9.2–12.9)
PMV BLD AUTO: 9.4 FL (ref 9.2–12.9)
POCT GLUCOSE: 103 MG/DL (ref 70–110)
POCT GLUCOSE: 108 MG/DL (ref 70–110)
POCT GLUCOSE: 178 MG/DL (ref 70–110)
POCT GLUCOSE: 302 MG/DL (ref 70–110)
PROTHROMBIN TIME: 13 SEC (ref 9–12.5)
RBC # BLD AUTO: 2.95 M/UL (ref 4.6–6.2)
RBC # BLD AUTO: 3.1 M/UL (ref 4.6–6.2)
RBC # BLD AUTO: 3.2 M/UL (ref 4.6–6.2)
RBC # BLD AUTO: 3.22 M/UL (ref 4.6–6.2)
RBC # BLD AUTO: 3.47 M/UL (ref 4.6–6.2)
WBC # BLD AUTO: 5.23 K/UL (ref 3.9–12.7)
WBC # BLD AUTO: 5.68 K/UL (ref 3.9–12.7)
WBC # BLD AUTO: 6.03 K/UL (ref 3.9–12.7)
WBC # BLD AUTO: 6.26 K/UL (ref 3.9–12.7)
WBC # BLD AUTO: 7.35 K/UL (ref 3.9–12.7)

## 2024-06-18 PROCEDURE — 63600175 PHARM REV CODE 636 W HCPCS: Mod: HCNC | Performed by: NURSE ANESTHETIST, CERTIFIED REGISTERED

## 2024-06-18 PROCEDURE — 63600175 PHARM REV CODE 636 W HCPCS: Mod: JZ,HCNC

## 2024-06-18 PROCEDURE — 09CR8ZZ EXTIRPATION OF MATTER FROM LEFT MAXILLARY SINUS, VIA NATURAL OR ARTIFICIAL OPENING ENDOSCOPIC: ICD-10-PCS | Performed by: STUDENT IN AN ORGANIZED HEALTH CARE EDUCATION/TRAINING PROGRAM

## 2024-06-18 PROCEDURE — 85025 COMPLETE CBC W/AUTO DIFF WBC: CPT | Mod: HCNC | Performed by: HOSPITALIST

## 2024-06-18 PROCEDURE — 25000003 PHARM REV CODE 250: Mod: HCNC | Performed by: NURSE ANESTHETIST, CERTIFIED REGISTERED

## 2024-06-18 PROCEDURE — D9220A PRA ANESTHESIA: Mod: HCNC,CRNA,, | Performed by: NURSE ANESTHETIST, CERTIFIED REGISTERED

## 2024-06-18 PROCEDURE — 093K8ZZ CONTROL BLEEDING IN NASAL MUCOSA AND SOFT TISSUE, VIA NATURAL OR ARTIFICIAL OPENING ENDOSCOPIC: ICD-10-PCS | Performed by: STUDENT IN AN ORGANIZED HEALTH CARE EDUCATION/TRAINING PROGRAM

## 2024-06-18 PROCEDURE — 36000706: Mod: HCNC | Performed by: STUDENT IN AN ORGANIZED HEALTH CARE EDUCATION/TRAINING PROGRAM

## 2024-06-18 PROCEDURE — 36415 COLL VENOUS BLD VENIPUNCTURE: CPT | Mod: HCNC | Performed by: HOSPITALIST

## 2024-06-18 PROCEDURE — 09CQ8ZZ EXTIRPATION OF MATTER FROM RIGHT MAXILLARY SINUS, VIA NATURAL OR ARTIFICIAL OPENING ENDOSCOPIC: ICD-10-PCS | Performed by: STUDENT IN AN ORGANIZED HEALTH CARE EDUCATION/TRAINING PROGRAM

## 2024-06-18 PROCEDURE — 63600175 PHARM REV CODE 636 W HCPCS: Mod: HCNC | Performed by: STUDENT IN AN ORGANIZED HEALTH CARE EDUCATION/TRAINING PROGRAM

## 2024-06-18 PROCEDURE — 71000015 HC POSTOP RECOV 1ST HR: Mod: HCNC | Performed by: STUDENT IN AN ORGANIZED HEALTH CARE EDUCATION/TRAINING PROGRAM

## 2024-06-18 PROCEDURE — 3E1988Z IRRIGATION OF NOSE USING IRRIGATING SUBSTANCE, VIA NATURAL OR ARTIFICIAL OPENING ENDOSCOPIC: ICD-10-PCS | Performed by: STUDENT IN AN ORGANIZED HEALTH CARE EDUCATION/TRAINING PROGRAM

## 2024-06-18 PROCEDURE — 09CV8ZZ EXTIRPATION OF MATTER FROM LEFT ETHMOID SINUS, VIA NATURAL OR ARTIFICIAL OPENING ENDOSCOPIC: ICD-10-PCS | Performed by: STUDENT IN AN ORGANIZED HEALTH CARE EDUCATION/TRAINING PROGRAM

## 2024-06-18 PROCEDURE — 71000033 HC RECOVERY, INTIAL HOUR: Mod: HCNC | Performed by: STUDENT IN AN ORGANIZED HEALTH CARE EDUCATION/TRAINING PROGRAM

## 2024-06-18 PROCEDURE — 09PK8YZ REMOVAL OF OTHER DEVICE FROM NASAL MUCOSA AND SOFT TISSUE, VIA NATURAL OR ARTIFICIAL OPENING ENDOSCOPIC: ICD-10-PCS | Performed by: STUDENT IN AN ORGANIZED HEALTH CARE EDUCATION/TRAINING PROGRAM

## 2024-06-18 PROCEDURE — 25000003 PHARM REV CODE 250: Mod: HCNC | Performed by: HOSPITALIST

## 2024-06-18 PROCEDURE — 85027 COMPLETE CBC AUTOMATED: CPT | Mod: 91,HCNC | Performed by: HOSPITALIST

## 2024-06-18 PROCEDURE — 85610 PROTHROMBIN TIME: CPT | Mod: HCNC | Performed by: HOSPITALIST

## 2024-06-18 PROCEDURE — 36000707: Mod: HCNC | Performed by: STUDENT IN AN ORGANIZED HEALTH CARE EDUCATION/TRAINING PROGRAM

## 2024-06-18 PROCEDURE — 21400001 HC TELEMETRY ROOM: Mod: HCNC

## 2024-06-18 PROCEDURE — 82962 GLUCOSE BLOOD TEST: CPT | Mod: HCNC | Performed by: STUDENT IN AN ORGANIZED HEALTH CARE EDUCATION/TRAINING PROGRAM

## 2024-06-18 PROCEDURE — 31238 NSL/SINS NDSC SRG NSL HEMRRG: CPT | Mod: 50,HCNC,, | Performed by: STUDENT IN AN ORGANIZED HEALTH CARE EDUCATION/TRAINING PROGRAM

## 2024-06-18 PROCEDURE — 25000003 PHARM REV CODE 250: Mod: HCNC | Performed by: STUDENT IN AN ORGANIZED HEALTH CARE EDUCATION/TRAINING PROGRAM

## 2024-06-18 PROCEDURE — 63600175 PHARM REV CODE 636 W HCPCS: Mod: HCNC | Performed by: HOSPITALIST

## 2024-06-18 PROCEDURE — 37000008 HC ANESTHESIA 1ST 15 MINUTES: Mod: HCNC | Performed by: STUDENT IN AN ORGANIZED HEALTH CARE EDUCATION/TRAINING PROGRAM

## 2024-06-18 PROCEDURE — 37000009 HC ANESTHESIA EA ADD 15 MINS: Mod: HCNC | Performed by: STUDENT IN AN ORGANIZED HEALTH CARE EDUCATION/TRAINING PROGRAM

## 2024-06-18 PROCEDURE — D9220A PRA ANESTHESIA: Mod: HCNC,ANES,, | Performed by: ANESTHESIOLOGY

## 2024-06-18 PROCEDURE — 94761 N-INVAS EAR/PLS OXIMETRY MLT: CPT | Mod: HCNC

## 2024-06-18 RX ORDER — FENTANYL CITRATE 50 UG/ML
INJECTION, SOLUTION INTRAMUSCULAR; INTRAVENOUS
Status: DISCONTINUED | OUTPATIENT
Start: 2024-06-18 | End: 2024-06-18

## 2024-06-18 RX ORDER — LABETALOL HYDROCHLORIDE 5 MG/ML
INJECTION, SOLUTION INTRAVENOUS
Status: DISCONTINUED
Start: 2024-06-18 | End: 2024-06-18 | Stop reason: WASHOUT

## 2024-06-18 RX ORDER — FENTANYL CITRATE 50 UG/ML
25 INJECTION, SOLUTION INTRAMUSCULAR; INTRAVENOUS EVERY 5 MIN PRN
Status: CANCELLED | OUTPATIENT
Start: 2024-06-18

## 2024-06-18 RX ORDER — PHENYLEPHRINE HYDROCHLORIDE 10 MG/ML
INJECTION INTRAVENOUS
Status: DISCONTINUED | OUTPATIENT
Start: 2024-06-18 | End: 2024-06-18

## 2024-06-18 RX ORDER — SODIUM CHLORIDE 9 MG/ML
INJECTION, SOLUTION INTRAVENOUS CONTINUOUS
Status: CANCELLED | OUTPATIENT
Start: 2024-06-18

## 2024-06-18 RX ORDER — OXYMETAZOLINE HCL 0.05 %
SPRAY, NON-AEROSOL (ML) NASAL
Status: DISCONTINUED | OUTPATIENT
Start: 2024-06-18 | End: 2024-06-18 | Stop reason: HOSPADM

## 2024-06-18 RX ORDER — PROPOFOL 10 MG/ML
VIAL (ML) INTRAVENOUS
Status: DISCONTINUED | OUTPATIENT
Start: 2024-06-18 | End: 2024-06-18

## 2024-06-18 RX ORDER — DEXAMETHASONE SODIUM PHOSPHATE 4 MG/ML
INJECTION, SOLUTION INTRA-ARTICULAR; INTRALESIONAL; INTRAMUSCULAR; INTRAVENOUS; SOFT TISSUE
Status: DISCONTINUED | OUTPATIENT
Start: 2024-06-18 | End: 2024-06-18

## 2024-06-18 RX ORDER — MUPIROCIN 20 MG/G
OINTMENT TOPICAL
Status: DISCONTINUED | OUTPATIENT
Start: 2024-06-18 | End: 2024-06-18 | Stop reason: HOSPADM

## 2024-06-18 RX ORDER — SUCCINYLCHOLINE CHLORIDE 20 MG/ML
INJECTION INTRAMUSCULAR; INTRAVENOUS
Status: DISCONTINUED | OUTPATIENT
Start: 2024-06-18 | End: 2024-06-18

## 2024-06-18 RX ORDER — EPINEPHRINE 1 MG/ML
INJECTION, SOLUTION, CONCENTRATE INTRAVENOUS
Status: DISCONTINUED | OUTPATIENT
Start: 2024-06-18 | End: 2024-06-18 | Stop reason: HOSPADM

## 2024-06-18 RX ORDER — SODIUM CHLORIDE 0.9 % (FLUSH) 0.9 %
3 SYRINGE (ML) INJECTION
Status: CANCELLED | OUTPATIENT
Start: 2024-06-18

## 2024-06-18 RX ORDER — ONDANSETRON HYDROCHLORIDE 2 MG/ML
INJECTION, SOLUTION INTRAVENOUS
Status: DISCONTINUED | OUTPATIENT
Start: 2024-06-18 | End: 2024-06-18

## 2024-06-18 RX ORDER — LABETALOL HCL 20 MG/4 ML
10 SYRINGE (ML) INTRAVENOUS ONCE AS NEEDED
Status: COMPLETED | OUTPATIENT
Start: 2024-06-18 | End: 2024-06-18

## 2024-06-18 RX ORDER — LIDOCAINE HYDROCHLORIDE 20 MG/ML
INJECTION, SOLUTION EPIDURAL; INFILTRATION; INTRACAUDAL; PERINEURAL
Status: DISCONTINUED | OUTPATIENT
Start: 2024-06-18 | End: 2024-06-18

## 2024-06-18 RX ADMIN — FENTANYL CITRATE 25 MCG: 50 INJECTION, SOLUTION INTRAMUSCULAR; INTRAVENOUS at 09:06

## 2024-06-18 RX ADMIN — LABETALOL HYDROCHLORIDE 10 MG: 5 INJECTION, SOLUTION INTRAVENOUS at 10:06

## 2024-06-18 RX ADMIN — ATORVASTATIN CALCIUM 80 MG: 40 TABLET, FILM COATED ORAL at 09:06

## 2024-06-18 RX ADMIN — FENTANYL CITRATE 25 MCG: 50 INJECTION, SOLUTION INTRAMUSCULAR; INTRAVENOUS at 10:06

## 2024-06-18 RX ADMIN — ONDANSETRON 4 MG: 2 INJECTION INTRAMUSCULAR; INTRAVENOUS at 10:06

## 2024-06-18 RX ADMIN — ACETAMINOPHEN 650 MG: 325 TABLET ORAL at 04:06

## 2024-06-18 RX ADMIN — AMOXICILLIN AND CLAVULANATE POTASSIUM 500 MG: 500; 125 TABLET, FILM COATED ORAL at 09:06

## 2024-06-18 RX ADMIN — PROPOFOL 100 MG: 10 INJECTION, EMULSION INTRAVENOUS at 09:06

## 2024-06-18 RX ADMIN — SODIUM CHLORIDE: 9 INJECTION, SOLUTION INTRAVENOUS at 09:06

## 2024-06-18 RX ADMIN — DEXAMETHASONE SODIUM PHOSPHATE 4 MG: 4 INJECTION, SOLUTION INTRAMUSCULAR; INTRAVENOUS at 10:06

## 2024-06-18 RX ADMIN — TRAZODONE HYDROCHLORIDE 50 MG: 50 TABLET ORAL at 09:06

## 2024-06-18 RX ADMIN — WARFARIN SODIUM 5 MG: 5 TABLET ORAL at 06:06

## 2024-06-18 RX ADMIN — LIDOCAINE HYDROCHLORIDE 80 MG: 20 INJECTION, SOLUTION EPIDURAL; INFILTRATION; INTRACAUDAL; PERINEURAL at 09:06

## 2024-06-18 RX ADMIN — INSULIN ASPART 1 UNITS: 100 INJECTION, SOLUTION INTRAVENOUS; SUBCUTANEOUS at 09:06

## 2024-06-18 RX ADMIN — PHENYLEPHRINE HYDROCHLORIDE 100 MCG: 10 INJECTION INTRAVENOUS at 09:06

## 2024-06-18 RX ADMIN — SUCCINYLCHOLINE CHLORIDE 120 MG: 20 INJECTION, SOLUTION INTRAMUSCULAR; INTRAVENOUS at 09:06

## 2024-06-18 NOTE — PLAN OF CARE
Alyssa HEBERT taking call for Dr Russell via hospital . Rec'd rtn call from Dr Mitchell, discussed with him need for blood consent. Md verbalized understanding.

## 2024-06-18 NOTE — ASSESSMENT & PLAN NOTE
83M w extensive PMH, known to ENT service for refractory epistaxis status post bilateral SP ligation followed by bilateral embolization, cautery on the left septum for persistent bleeding on 11/03/2023 and again recently on 12/18/23. He recently underwent left AEA ligation on 2/28/24 and nasal cautery on 5/21. Presents to ED w further bleeding, rhinorocket placed and remains hemostatic. Supratherapeutic INR.    Packed with right merocel and inflated left rhino. No further bleeding.     Plan to take to OR today for removal of nasal packing and nasal endoscopy w cautery w Dr. Russell 6/17/24. Consent obtained and placed in chart  NPO  Please page w questions or concerns

## 2024-06-18 NOTE — SUBJECTIVE & OBJECTIVE
Interval History: No further bleeding overnight.    Medications:  Continuous Infusions:  Scheduled Meds:   allopurinoL  100 mg Oral Daily    amoxicillin-clavulanate 500-125mg  1 tablet Oral BID    atorvastatin  80 mg Oral QHS    bumetanide  2 mg Oral Every other day    ferrous gluconate  324 mg Oral Daily with breakfast    metoprolol succinate  25 mg Oral Daily    sodium chloride  2 spray Each Nostril TID    warfarin  5 mg Oral Daily     PRN Meds:  Current Facility-Administered Medications:     0.9%  NaCl infusion (for blood administration), , Intravenous, Q24H PRN    0.9%  NaCl infusion (for blood administration), , Intravenous, Q24H PRN    acetaminophen, 650 mg, Oral, Q4H PRN    aluminum-magnesium hydroxide-simethicone, 30 mL, Oral, QID PRN    dextrose 10%, 12.5 g, Intravenous, PRN    dextrose 10%, 25 g, Intravenous, PRN    glucagon (human recombinant), 1 mg, Intramuscular, PRN    glucose, 16 g, Oral, PRN    glucose, 24 g, Oral, PRN    insulin aspart U-100, 0-5 Units, Subcutaneous, QID (AC + HS) PRN    naloxone, 0.02 mg, Intravenous, PRN    ondansetron, 4 mg, Intravenous, Q8H PRN    oxyCODONE, 5 mg, Oral, Q6H PRN    oxyCODONE-acetaminophen, 2 tablet, Oral, Q6H PRN    polyethylene glycol, 17 g, Oral, BID PRN    prochlorperazine, 5 mg, Intravenous, Q6H PRN    senna-docusate 8.6-50 mg, 1 tablet, Oral, Daily PRN    sodium chloride 0.9%, 10 mL, Intravenous, Q6H PRN    traZODone, 50 mg, Oral, Nightly PRN     Review of patient's allergies indicates:   Allergen Reactions    Lisinopril     Losartan      Intolerance- elevates potassium level     Objective:     Vital Signs (24h Range):  Temp:  [97.5 °F (36.4 °C)-99.3 °F (37.4 °C)] 97.6 °F (36.4 °C)  Pulse:  [42-71] 61  Resp:  [18-20] 18  SpO2:  [93 %-98 %] 98 %  BP: (110-148)/(41-70) 148/70       Lines/Drains/Airways       Peripheral Intravenous Line  Duration                  Peripheral IV - Single Lumen 06/12/24 1423 20 G Anterior;Left Forearm 5 days         Peripheral IV  - Single Lumen 06/14/24 0011 22 G Anterior;Right Forearm 4 days                     Physical Exam  NAD  NCAT  L rhinorocket and R merocel in place, no epistaxis  Breathing well on RA     Significant Labs:  CBC:   Recent Labs   Lab 06/18/24  0321   WBC 5.23   RBC 3.22*   HGB 9.4*   HCT 29.8*      MCV 93   MCH 29.2   MCHC 31.5*       Significant Diagnostics:  None

## 2024-06-18 NOTE — SUBJECTIVE & OBJECTIVE
Interval History:   : Tentative plan to go to OR with ENT to remove nasal packing.     Review of Systems   Respiratory:  Negative for chest tightness and shortness of breath.    Cardiovascular:  Negative for chest pain.   Gastrointestinal: Negative.    Genitourinary: Negative.    Psychiatric/Behavioral:  Negative for confusion.      Objective:     Vital Signs (Most Recent):  Temp: 97.3 °F (36.3 °C) (24 1122)  Pulse: 69 (24 1435)  Resp: 18 (24 1122)  BP: (!) 163/70 (24 1122)  SpO2: (!) 93 % (24 1122) Vital Signs (24h Range):  Temp:  [97 °F (36.1 °C)-99.3 °F (37.4 °C)] 97.3 °F (36.3 °C)  Pulse:  [54-69] 69  Resp:  [12-20] 18  SpO2:  [93 %-100 %] 93 %  BP: (126-168)/(41-74) 163/70     Weight: 69.9 kg (154 lb)  Body mass index is 25.63 kg/m².    Intake/Output Summary (Last 24 hours) at 2024 1444  Last data filed at 2024 1037  Gross per 24 hour   Intake 600 ml   Output --   Net 600 ml         Physical Exam  Vitals and nursing note reviewed.   Constitutional:       General: He is not in acute distress.     Appearance: He is not diaphoretic.   HENT:      Head: Normocephalic and atraumatic.      Nose:      Comments: Left nare rhino-rocket  Eyes:      General: No scleral icterus.  Cardiovascular:      Rate and Rhythm: Regular rhythm. Bradycardia present.      Comments: Mechanical click  Pulmonary:      Effort: Pulmonary effort is normal. No respiratory distress.      Breath sounds: No wheezing or rales.   Abdominal:      General: Bowel sounds are normal.      Palpations: Abdomen is soft.   Musculoskeletal:      Right lower leg: No edema.      Left lower le+ Pitting Edema present.   Skin:     General: Skin is warm and dry.   Neurological:      General: No focal deficit present.      Mental Status: He is alert and oriented to person, place, and time.             Significant Labs: All pertinent labs within the past 24 hours have been reviewed.    Significant Imaging: I have  reviewed all pertinent imaging results/findings within the past 24 hours.

## 2024-06-18 NOTE — TRANSFER OF CARE
"Anesthesia Transfer of Care Note    Patient: Dariel Urbina    Procedure(s) Performed: Procedure(s) (LRB):  CONTROL OF EPISTAXIS, POSTERIOR, USING NASAL PACKING OR CAUTERIZATION (Bilateral)    Patient location: PACU    Anesthesia Type: general    Transport from OR: Transported from OR on 6-10 L/min O2 by face mask with adequate spontaneous ventilation    Post pain: adequate analgesia    Post assessment: no apparent anesthetic complications    Post vital signs: stable    Level of consciousness: awake and sedated    Nausea/Vomiting: no nausea/vomiting    Complications: none    Transfer of care protocol was followed      Last vitals: Visit Vitals  BP (!) 168/70   Pulse 67   Temp 36.2 °C (97.2 °F) (Temporal)   Resp 15   Ht 5' 5" (1.651 m)   Wt 69.9 kg (154 lb)   SpO2 98%   BMI 25.63 kg/m²     "

## 2024-06-18 NOTE — ANESTHESIA POSTPROCEDURE EVALUATION
Anesthesia Post Evaluation    Patient: Dariel Urbina    Procedure(s) Performed: Procedure(s) (LRB):  CONTROL OF EPISTAXIS, POSTERIOR, USING NASAL PACKING OR CAUTERIZATION (Bilateral)    Final Anesthesia Type: general      Patient location during evaluation: PACU  Patient participation: No - Unable to Participate, Sedation  Level of consciousness: sedated  Post-procedure vital signs: reviewed and stable  Pain management: adequate  Airway patency: patent    PONV status at discharge: No PONV  Anesthetic complications: no      Cardiovascular status: hemodynamically stable  Respiratory status: unassisted and spontaneous ventilation  Hydration status: euvolemic  Follow-up not needed.          Vitals Value Taken Time   /67 06/18/24 1046   Temp 36.2 °C (97.2 °F) 06/18/24 1030   Pulse 62 06/18/24 1052   Resp 18 06/18/24 1052   SpO2 97 % 06/18/24 1052   Vitals shown include unfiled device data.      No case tracking events are documented in the log.      Pain/Sanjeev Score: Pain Rating Prior to Med Admin: 5 (6/17/2024  2:49 PM)  Sanjeev Score: 9 (6/18/2024 10:39 AM)

## 2024-06-18 NOTE — PROGRESS NOTES
[facility] Medicine  Progress Note    Patient Name: Dariel Urbina  MRN: [id number]  Patient Class: IP- Inpatient   Admission Date: 6/12/2024  Length of Stay: 5 days  Attending Physician: Aga Hernandez MD  Primary Care Provider: Devon Langston Jr., MD        Subjective:     Principal Problem:Epistaxis        HPI:  82 y/o WM CAD s/p CABG, MR s/p mitraclib, Afib, Type 2 DM, CKD4,  s/p mechanical aortic valve placement on warfarin anticoagulation w/ hx of recurrent epistaxis presents to the ED for epistaxis.  He reports painless bleeding from left nare onset @ 0400 today.  He did not want to come to the hospital, but wife advised him to.  He is found with supratherapeutic INR 4.4.  His last dose of warfarin was yesterday.      ENT has placed rhino rocket to left nare. Consult note pending.  Patient reports its always left nare that has recurrent bleeding. He has Hemoglobin 6.6 and 1unit PRBC ordered in ED and infusing during my interview.      Pt has no complaints of chest pain/dyspnea, no nausea/vomiting, no hematemesis, hematuria, or other sites of bruising or bleeding.     He was recently admitted to [facility] for same reason (5/29-6/3), INR was >8 last admission. Required PRBC transfusion.  Hospital course was complicated by JIM on CKD and acute on chronic heart failure exacerbation.     Overview/Hospital Course:  No notes on file    Interval History:   6/18: Tentative plan to go to OR with ENT to remove nasal packing.     Review of Systems   Respiratory:  Negative for chest tightness and shortness of breath.    Cardiovascular:  Negative for chest pain.   Gastrointestinal: Negative.    Genitourinary: Negative.    Psychiatric/Behavioral:  Negative for confusion.      Objective:     Vital Signs (Most Recent):  Temp: 97.3 °F (36.3 °C) (06/18/24 1122)  Pulse: 69 (06/18/24 1435)  Resp: 18 (06/18/24 1122)  BP: (!) 163/70 (06/18/24 1122)  SpO2: (!) 93 % (06/18/24 1122) Vital Signs (24h Range):  Temp:   [97 °F (36.1 °C)-99.3 °F (37.4 °C)] 97.3 °F (36.3 °C)  Pulse:  [54-69] 69  Resp:  [12-20] 18  SpO2:  [93 %-100 %] 93 %  BP: (126-168)/(41-74) 163/70     Weight: 69.9 kg (154 lb)  Body mass index is 25.63 kg/m².    Intake/Output Summary (Last 24 hours) at 2024 1444  Last data filed at 2024 1037  Gross per 24 hour   Intake 600 ml   Output --   Net 600 ml         Physical Exam  Vitals and nursing note reviewed.   Constitutional:       General: He is not in acute distress.     Appearance: He is not diaphoretic.   HENT:      Head: Normocephalic and atraumatic.      Nose:      Comments: Left nare rhino-rocket  Eyes:      General: No scleral icterus.  Cardiovascular:      Rate and Rhythm: Regular rhythm. Bradycardia present.      Comments: Mechanical click  Pulmonary:      Effort: Pulmonary effort is normal. No respiratory distress.      Breath sounds: No wheezing or rales.   Abdominal:      General: Bowel sounds are normal.      Palpations: Abdomen is soft.   Musculoskeletal:      Right lower leg: No edema.      Left lower le+ Pitting Edema present.   Skin:     General: Skin is warm and dry.   Neurological:      General: No focal deficit present.      Mental Status: He is alert and oriented to person, place, and time.             Significant Labs: All pertinent labs within the past 24 hours have been reviewed.    Significant Imaging: I have reviewed all pertinent imaging results/findings within the past 24 hours.    Assessment/Plan:      * Epistaxis  -recurrent left nare bleeding with supratherapeutic INR causing acute blood loss anemia.    -s/p Rhinorocket placement in ED  - w/ afrin spray and tranexamic acid intranasally   - ENT following  - : tentative plans to remove packing in OR     -per last ENT inpatient consult recs   Avoid use of nasal cannula if any hypoxia develops - use only humidified o2 via face mask/face tent  For recurrent bleeding - given presence of rhino rocket - would contact ENT  "on-call  Prior ENT notes have mentioned possible need for angiography and embolization with IR.       Prior history of ENT procedures "bilateral SP ligation followed by bilateral embolization, cautery on the left septum for persistent bleeding on 11/03/2023 and again recently on 12/18/23. He recently underwent left AEA ligation on 2/28/24. Most recently seen in clinic 5/21 and underwent L sided cautery."    Coronary artery disease involving native coronary artery of native heart without angina pectoris  Patient with known CAD s/p CABG, which is controlled Will continue  beta blocker, ASA, and Statin and monitor for S/Sx of angina/ACS. Continue to monitor on telemetry.     Recurrent epistaxis  See above      Acute blood loss anemia  -secondary to epistaxis  -receiving 3 U PRBC total this hospital stay:   - Vitamin K x 1 given on 6/13  - source control obtained via nasal packing  - notable that patient required emobolization previously  - resumed coumadin   - ENT following.     H/O mechanical aortic valve replacement  As per chronic anticoagulation      Type 2 diabetes mellitus with stage 4 chronic kidney disease, without long-term current use of insulin  Trend chemistry panel  -glucose monitoring   -PRN insulin    Chronic anticoagulation  INR goal is 2.0-3.0   -pharmD consulted.   - continue coumadin        VTE Risk Mitigation (From admission, onward)           Ordered     warfarin (COUMADIN) tablet 5 mg  Daily         06/15/24 1621     Reason for No Pharmacological VTE Prophylaxis  Once        Comments: Supratherapeutic inr   Question:  Reasons:  Answer:  Physician Provided (leave comment)    06/12/24 2221     IP VTE HIGH RISK PATIENT  Once         06/12/24 2221     Place sequential compression device  Until discontinued         06/12/24 2221                    Discharge Planning   ASTER: 6/19/2024     Code Status: Full Code   Is the patient medically ready for discharge?:     Reason for patient still in hospital " (select all that apply): Patient trending condition  Discharge Plan A: Home with family   Discharge Delays: None known at this time              Aga Hernandez MD  Department of Hospital Medicine   [facility] Surg

## 2024-06-18 NOTE — CONSULTS
PHARMACY CONSULT NOTE: WARFARIN  Dariel Urbina is a 83 y.o. male on warfarin therapy for  AF and mechanical aortic valve . PharmD has been consulted for warfarin dosing.     Current order: 5 mg daily  Home dose: 5 mg daily  Coumadin clinic enrollment: Active  INR goal: 2-3           Lab Results   Component Value Date     INR 1.1 06/17/2024     INR 1.1 06/16/2024     INR 1.2 06/15/2024      Significant drug interactions: allopurinol, augmentin  Diet: Adult Regular with Boost supplements      Recommendation(s):   Patient initially presented w/ epistaxis and elevated INR - reviewing coumadin clinic notes shows patient has been on 5 mg daily and end of May INR was 7.4 on the same regimen  Being cautious with dosing warfarin given previous response to 5 mg daily - INR starting to trend up, will c/w 5 mg for now. If INR does not continue to trend up Wednesday 6/19 will consider a single 7.5 mg dose  Its important that the patient is not discharged on 5 mg daily given previous supratherapeutic INR's associated with 5 mg daily  C/w daily INR labs  Pharmacy will continue to follow and monitor warfarin     Thank you for the consult,  Teddy Whittington, PharmD, [facility]   Ext. [phone]     **Note: Consults are reviewed Monday-Friday 7:00am-3:30pm. THE ABOVE RECOMMENDATIONS ARE ONLY SUGGESTED.THE RECOMMENDATIONS SHOULD BE CONSIDERED IN CONJUNCTION WITH ALL PATIENT FACTORS. **

## 2024-06-18 NOTE — NURSING TRANSFER
Nursing Transfer Note      6/18/2024   11:06 AM    Nurse giving handoff:JILLIAN Abad RN  Nurse receiving handoff:[facility] RN    Reason patient is being transferred: per md order    Transfer To: [id number]    Transfer via stretcher    Transfer with cardiac monitoring    Transported by pct    Order for Tele Monitor? Yes    Chart send with patient: Yes    Notified: spouse

## 2024-06-18 NOTE — PROGRESS NOTES
[facility] Surg  Otorhinolaryngology-Head & Neck Surgery  Progress Note    Subjective:     Post-Op Info:  Procedure(s) (LRB):  CONTROL OF EPISTAXIS, POSTERIOR, USING NASAL PACKING OR CAUTERIZATION (Bilateral)      Hospital Day: 7     Interval History: No further bleeding overnight.    Medications:  Continuous Infusions:  Scheduled Meds:   allopurinoL  100 mg Oral Daily    amoxicillin-clavulanate 500-125mg  1 tablet Oral BID    atorvastatin  80 mg Oral QHS    bumetanide  2 mg Oral Every other day    ferrous gluconate  324 mg Oral Daily with breakfast    metoprolol succinate  25 mg Oral Daily    sodium chloride  2 spray Each Nostril TID    warfarin  5 mg Oral Daily     PRN Meds:  Current Facility-Administered Medications:     0.9%  NaCl infusion (for blood administration), , Intravenous, Q24H PRN    0.9%  NaCl infusion (for blood administration), , Intravenous, Q24H PRN    acetaminophen, 650 mg, Oral, Q4H PRN    aluminum-magnesium hydroxide-simethicone, 30 mL, Oral, QID PRN    dextrose 10%, 12.5 g, Intravenous, PRN    dextrose 10%, 25 g, Intravenous, PRN    glucagon (human recombinant), 1 mg, Intramuscular, PRN    glucose, 16 g, Oral, PRN    glucose, 24 g, Oral, PRN    insulin aspart U-100, 0-5 Units, Subcutaneous, QID (AC + HS) PRN    naloxone, 0.02 mg, Intravenous, PRN    ondansetron, 4 mg, Intravenous, Q8H PRN    oxyCODONE, 5 mg, Oral, Q6H PRN    oxyCODONE-acetaminophen, 2 tablet, Oral, Q6H PRN    polyethylene glycol, 17 g, Oral, BID PRN    prochlorperazine, 5 mg, Intravenous, Q6H PRN    senna-docusate 8.6-50 mg, 1 tablet, Oral, Daily PRN    sodium chloride 0.9%, 10 mL, Intravenous, Q6H PRN    traZODone, 50 mg, Oral, Nightly PRN     Review of patient's allergies indicates:   Allergen Reactions    Lisinopril     Losartan      Intolerance- elevates potassium level     Objective:     Vital Signs (24h Range):  Temp:  [97.5 °F (36.4 °C)-99.3 °F (37.4 °C)] 97.6 °F (36.4 °C)  Pulse:  [42-71] 61  Resp:  [18-20]  18  SpO2:  [93 %-98 %] 98 %  BP: (110-148)/(41-70) 148/70       Lines/Drains/Airways       Peripheral Intravenous Line  Duration                  Peripheral IV - Single Lumen 06/12/24 1423 20 G Anterior;Left Forearm 5 days         Peripheral IV - Single Lumen 06/14/24 0011 22 G Anterior;Right Forearm 4 days                     Physical Exam  NAD  NCAT  L rhinorocket and R merocel in place, no epistaxis  Breathing well on RA     Significant Labs:  CBC:   Recent Labs   Lab 06/18/24  0321   WBC 5.23   RBC 3.22*   HGB 9.4*   HCT 29.8*      MCV 93   MCH 29.2   MCHC 31.5*       Significant Diagnostics:  None  Assessment/Plan:     Recurrent epistaxis  83M w extensive PMH, known to ENT service for refractory epistaxis status post bilateral SP ligation followed by bilateral embolization, cautery on the left septum for persistent bleeding on 11/03/2023 and again recently on 12/18/23. He recently underwent left AEA ligation on 2/28/24 and nasal cautery on 5/21. Presents to ED w further bleeding, rhinorocket placed and remains hemostatic. Supratherapeutic INR.    Packed with right merocel and inflated left rhino. No further bleeding.     Plan to take to OR today for removal of nasal packing and nasal endoscopy w cautery w Dr. Russell 6/17/24. Consent obtained and placed in chart  NPO  Please page w questions or concerns        Yenifer Sandoval MD  Otorhinolaryngology-Head & Neck Surgery  [facility] Surg

## 2024-06-18 NOTE — ASSESSMENT & PLAN NOTE
-secondary to epistaxis  -receiving 3 U PRBC total this hospital stay:   - Vitamin K x 1 given on 6/13  - source control obtained via nasal packing  - notable that patient required emobolization previously  - resumed coumadin   - ENT following.

## 2024-06-18 NOTE — OP NOTE
DATE OF PROCEDURE: 6/18/2024     PREOPERATIVE DIAGNOSES:   Epistaxis [R04.0]    POSTOPERATIVE DIAGNOSES:   Epistaxis [R04.0]    SURGEON:  Surgeons and Role:     * Jono Russell MD - Primary      PROCEDURES PERFORMED:   Bilateral nasal debridement.  Bilateral nasal endoscopy with repeat control of epistaxis using cautery.    ANESTHESIA: General    INDICATIONS FOR PROCEDURE:   Dariel Urbina is a 83 y.o. very well known to the ENT service.  In brief he has a gentleman that is undergone multiple surgical procedures for attempted control of his nosebleeds.  He was previously undergone bilateral SP ligation, left-sided anterior ethmoid ligation.  And multiple trips to the operating room for cautery.  He recently presented with a supratherapeutic INR and spontaneous bleeding on the left.  He had a rhino rocket placed by the ER and then subsequently started developed right-sided bleeding.  A Merocel was placed in the right side by the ENT service.  He was advised to undergo repeat cautery with removal of packs.  During his hospital stay he is INR had become in a more therapeutic range.    He was apprised of the risks, benefits and alternatives to surgery.  In spite of the risk inherent to surgery,he provided informed consent for the aforementioned procedures.     PROCEDURE IN DETAIL:  The patient was taken to the operating room and placed on the operating table in the supine position.  General endotracheal anesthesia was induced by the anesthesia team.     The patient was then rotated 90° away from anesthesia.  He was then prepped and draped in usual sterile fashion.  A preoperative time-out was taken to confirm the patient and procedure being performed.    Zero-degree endoscope was then used to assess the left and right nasal cavity.  First attention to the right.  The Merocel was removed from the right nose and nasal cavity.  The perforation was noted to be bleeding of the posterior rim.  The suction Bovie was  then used to cauterize the right posterior septum.  The bleeding was then controlled and the nose was copiously irrigated and debrided of all nonviable tissue.  Significant amount of blood clot within the right maxillary sinus that was removed.    Attention was then turned to the left nasal cavity.  The rhino rocket was then deflated and removed.  The zero-degree endoscope was then used to debride the nose and nasal cavity and sinuses of any fibrinous debris and old blood clots.  There was significant amount of blood clot within the left maxillary and ethmoid sinuses.  Once the blood clot nonviable tissue was removed.  The the zero-degree endoscope was used to assess the septum.  There was diffuse mucosal oozing noted along the right nasal septum.  The suction Bovie was then used to cauterize any actively being spots.  There was a separate lesion along the left lateral nasal wall that was actively bleeding.  This was cauterized using modest electrocautery.  Once hemostasis was confirmed the left nasal cavity was packed using a novapak to assist with healing of the mucosa and hemostasis.    At this point the procedure deemed to be complete.  The patient was handed back over to anesthesia to be extubated in a stable condition.    There were no intraoperative complications.  I was present for and participated in the entire procedure as dictated above.       ESTIMATED BLOOD LOSS: 10cc    SPECIMENS:   Specimen (24h ago, onward)      None            Jono Russell MD

## 2024-06-18 NOTE — ASSESSMENT & PLAN NOTE
"-recurrent left nare bleeding with supratherapeutic INR causing acute blood loss anemia.    -s/p Rhinorocket placement in ED  - w/ afrin spray and tranexamic acid intranasally   - ENT following  - 6/18: tentative plans to remove packing in OR     -per last ENT inpatient consult recs   Avoid use of nasal cannula if any hypoxia develops - use only humidified o2 via face mask/face tent  For recurrent bleeding - given presence of rhino rocket - would contact ENT on-call  Prior ENT notes have mentioned possible need for angiography and embolization with IR.       Prior history of ENT procedures "bilateral SP ligation followed by bilateral embolization, cautery on the left septum for persistent bleeding on 11/03/2023 and again recently on 12/18/23. He recently underwent left AEA ligation on 2/28/24. Most recently seen in clinic 5/21 and underwent L sided cautery."  "

## 2024-06-18 NOTE — ASSESSMENT & PLAN NOTE
Patient with known CAD s/p CABG, which is controlled Will continue  beta blocker, ASA, and Statin and monitor for S/Sx of angina/ACS. Continue to monitor on telemetry.

## 2024-06-18 NOTE — ANESTHESIA PROCEDURE NOTES
Intubation    Date/Time: 6/18/2024 9:47 AM    Performed by: Ailyn Hanna CRNA  Authorized by: Jigna Singh MD    Intubation:     Induction:  Rapid sequence induction    Intubated:  Postinduction    Mask Ventilation:  Not attempted    Attempts:  1    Attempted By:  CRNA    Method of Intubation:  Video laryngoscopy    Blade:  Osorio 3    Laryngeal View Grade: Grade IIA - cords partially seen      Difficult Airway Encountered?: No      Complications:  None    Airway Device:  Oral endotracheal tube    Airway Device Size:  7.0    Style/Cuff Inflation:  Cuffed (inflated to minimal occlusive pressure)    Tube secured:  22    Secured at:  The lips    Placement Verified By:  Capnometry    Complicating Factors:  None    Findings Post-Intubation:  BS equal bilateral and atraumatic/condition of teeth unchanged      
no

## 2024-06-18 NOTE — BRIEF OP NOTE
[facility] Surg  Brief Operative Note    Surgery Date: 6/18/2024     Surgeons and Role:     * Jono Russell MD - Primary    Assisting Surgeon: None    Pre-op Diagnosis:  Epistaxis [R04.0]    Post-op Diagnosis:  Post-Op Diagnosis Codes:     * Epistaxis [R04.0]    Procedure(s) (LRB):  CONTROL OF EPISTAXIS, POSTERIOR, USING NASAL PACKING OR CAUTERIZATION (Bilateral)    Anesthesia: General    Operative Findings: diffuse mucosal oozing along left lateral wall and septum. Small foci along right posterior wall of septal perforation.     Estimated Blood Loss: 10cc         Specimens:   Specimen (24h ago, onward)      None          Jono Russell MD

## 2024-06-18 NOTE — PLAN OF CARE
APPOINTMENT:    Patient Appointment(s) scheduled with [place] [facility]  Tuesday Jun 25, 2024 8:30 AM

## 2024-06-19 VITALS
RESPIRATION RATE: 18 BRPM | HEIGHT: 65 IN | WEIGHT: 154 LBS | HEART RATE: 58 BPM | TEMPERATURE: 98 F | BODY MASS INDEX: 25.66 KG/M2 | OXYGEN SATURATION: 93 % | SYSTOLIC BLOOD PRESSURE: 139 MMHG | DIASTOLIC BLOOD PRESSURE: 61 MMHG

## 2024-06-19 LAB
ERYTHROCYTE [DISTWIDTH] IN BLOOD BY AUTOMATED COUNT: 17.5 % (ref 11.5–14.5)
ERYTHROCYTE [DISTWIDTH] IN BLOOD BY AUTOMATED COUNT: 17.7 % (ref 11.5–14.5)
HCT VFR BLD AUTO: 27.4 % (ref 40–54)
HCT VFR BLD AUTO: 28.3 % (ref 40–54)
HGB BLD-MCNC: 8.6 G/DL (ref 14–18)
HGB BLD-MCNC: 8.7 G/DL (ref 14–18)
INR PPP: 1.3 (ref 0.8–1.2)
MCH RBC QN AUTO: 29.2 PG (ref 27–31)
MCH RBC QN AUTO: 29.3 PG (ref 27–31)
MCHC RBC AUTO-ENTMCNC: 30.7 G/DL (ref 32–36)
MCHC RBC AUTO-ENTMCNC: 31.4 G/DL (ref 32–36)
MCV RBC AUTO: 93 FL (ref 82–98)
MCV RBC AUTO: 95 FL (ref 82–98)
PLATELET # BLD AUTO: 161 K/UL (ref 150–450)
PLATELET # BLD AUTO: 178 K/UL (ref 150–450)
PMV BLD AUTO: 10.2 FL (ref 9.2–12.9)
PMV BLD AUTO: 10.4 FL (ref 9.2–12.9)
POCT GLUCOSE: 125 MG/DL (ref 70–110)
POCT GLUCOSE: 228 MG/DL (ref 70–110)
PROTHROMBIN TIME: 14.3 SEC (ref 9–12.5)
RBC # BLD AUTO: 2.95 M/UL (ref 4.6–6.2)
RBC # BLD AUTO: 2.97 M/UL (ref 4.6–6.2)
WBC # BLD AUTO: 6.57 K/UL (ref 3.9–12.7)
WBC # BLD AUTO: 8.96 K/UL (ref 3.9–12.7)

## 2024-06-19 PROCEDURE — 85610 PROTHROMBIN TIME: CPT | Mod: HCNC | Performed by: HOSPITALIST

## 2024-06-19 PROCEDURE — 25000003 PHARM REV CODE 250: Mod: HCNC | Performed by: HOSPITALIST

## 2024-06-19 PROCEDURE — 63600175 PHARM REV CODE 636 W HCPCS: Mod: HCNC | Performed by: HOSPITALIST

## 2024-06-19 PROCEDURE — 36415 COLL VENOUS BLD VENIPUNCTURE: CPT | Mod: HCNC | Performed by: HOSPITALIST

## 2024-06-19 PROCEDURE — 85027 COMPLETE CBC AUTOMATED: CPT | Mod: 91,HCNC | Performed by: HOSPITALIST

## 2024-06-19 RX ADMIN — ALLOPURINOL 100 MG: 100 TABLET ORAL at 09:06

## 2024-06-19 RX ADMIN — METOPROLOL SUCCINATE 25 MG: 25 TABLET, EXTENDED RELEASE ORAL at 09:06

## 2024-06-19 RX ADMIN — NASAL 2 SPRAY: 6.5 SPRAY NASAL at 09:06

## 2024-06-19 RX ADMIN — Medication 324 MG: at 09:06

## 2024-06-19 RX ADMIN — INSULIN ASPART 2 UNITS: 100 INJECTION, SOLUTION INTRAVENOUS; SUBCUTANEOUS at 12:06

## 2024-06-19 NOTE — ASSESSMENT & PLAN NOTE
83M w extensive PMH, known to ENT service for refractory epistaxis status post bilateral SP ligation followed by bilateral embolization, cautery on the left septum for persistent bleeding on 11/03/2023 and again recently on 12/18/23. He recently underwent left AEA ligation on 2/28/24 and nasal cautery on 5/21. Presents to ED w further bleeding, rhinorocket placed and remains hemostatic. Supratherapeutic INR.    S/p cauterization in OR yesterday. Dissolvable packing in left nares.    Okay to discharge from ENT standpoint. Will message clinic for follow up next week.    Continue BID nasal saline spray at discharge.

## 2024-06-19 NOTE — NURSING
AVS/DC instructions given to patient with all questions/concerns addressed. PIV removed. No concerns as of present.

## 2024-06-19 NOTE — PLAN OF CARE
Problem: Adult Inpatient Plan of Care  Goal: Plan of Care Review  Outcome: Met  Goal: Patient-Specific Goal (Individualized)  Outcome: Met  Goal: Absence of Hospital-Acquired Illness or Injury  Outcome: Met  Goal: Optimal Comfort and Wellbeing  Outcome: Met  Goal: Readiness for Transition of Care  Outcome: Met     Problem: Infection  Goal: Absence of Infection Signs and Symptoms  Outcome: Met     Problem: Diabetes Comorbidity  Goal: Blood Glucose Level Within Targeted Range  Outcome: Met     Problem: Acute Kidney Injury/Impairment  Goal: Fluid and Electrolyte Balance  Outcome: Met  Goal: Improved Oral Intake  Outcome: Met  Goal: Effective Renal Function  Outcome: Met     Problem: Fall Injury Risk  Goal: Absence of Fall and Fall-Related Injury  Outcome: Met     Problem: Pain Acute  Goal: Optimal Pain Control and Function  Outcome: Met     Problem: Anemia  Goal: Anemia Symptom Improvement  Outcome: Met     Problem: Wound  Goal: Optimal Coping  Outcome: Met  Goal: Optimal Functional Ability  Outcome: Met  Goal: Absence of Infection Signs and Symptoms  Outcome: Met  Goal: Improved Oral Intake  Outcome: Met  Goal: Optimal Pain Control and Function  Outcome: Met  Goal: Skin Health and Integrity  Outcome: Met  Goal: Optimal Wound Healing  Outcome: Met      Case Management Discharge Planning Note    Patient name Jody Varma  [facility] [address] [facility] MRN [id number]  : 1942 Date 2022       Current Admission Date: 2022  Current Admission Diagnosis:SOB (shortness of breath)   Patient Active Problem List    Diagnosis Date Noted   • COVID-19 2022   • Pleural effusion 2022   • SOB (shortness of breath) 2022   • Bronchitis 2022   • Fluid overload 2022   • Hyperphosphatemia associated with renal failure 10/19/2022   • Urinary retention 10/17/2022   • ESRD on dialysis [facility]) 2022   • Hyponatremia 2022   • ANCA-associated vasculitis 2022   • Pulmonary hypertension ([facility] [address] ) 2022   • Dialysis patient ([facility] [address] ) 2022   • Anemia due to chronic kidney disease, on chronic dialysis ([facility] [address] )    • Hemoptysis 2022   • Other proteinuria    • Symptomatic anemia 2022   • Chest pain 2022   • Melena 2022   • Elevated PSA 2021   • Chronic pain of right knee 2021   • Primary osteoarthritis of right knee 2021   • Bladder stones 12/15/2016   • Type 2 diabetes mellitus with chronic kidney disease on chronic dialysis, with long-term current use of insulin ([facility] [address] ) 2016   • Benign colon polyp 2013   • Benign prostatic hyperplasia with lower urinary tract symptoms 2013   • Benign essential hypertension 2013   • Hyperlipidemia 2012   • Vitamin D deficiency 2012      LOS (days): 2  Geometric Mean LOS (GMLOS) (days): 5 20  Days to GMLOS:3     OBJECTIVE:  Risk of Unplanned Readmission Score: 52 9         Current admission status: Inpatient   Preferred Pharmacy:   [facility] [address] [place]  [phone]  Phone: [phone] Fax: [phone]    [facility] ([address] [facility] [address]  [address] [address]  Phone: [phone] Fax: [phone]    Primary Care Provider: Catracho Haywood MD    Primary Insurance: MEDICARE  Secondary Insurance: BLUE CROSS    DISCHARGE DETAILS:    Other Referral/Resources/Interventions Provided:  Interventions: Dialysis, Transportation, Short Term Rehab  Referral Comments: WCV requested for 1900 and confirmed by Artur Perez, RN Jennifer Paz, and Mckenzie Lopez at [facility] made aware  Mckenzie Lopez confirmed she spoke with admissions director at [facility] and patient is all set up for dialysis as per  his schedule of 1000 TTS       Discharge Destination Plan[de-identified] Short Term Rehab  Transport at Discharge : Wheelchair van  Dispatcher Contacted: Yes     Transported by [employer] and Unit #): Artur for 1900  ETA of Transport (Date): 11/30/22  ETA of Transport (Time): 1900

## 2024-06-19 NOTE — PLAN OF CARE
Problem: Adult Inpatient Plan of Care  Goal: Plan of Care Review  Outcome: Progressing  Goal: Patient-Specific Goal (Individualized)  Outcome: Progressing  Goal: Absence of Hospital-Acquired Illness or Injury  Outcome: Progressing  Goal: Optimal Comfort and Wellbeing  Outcome: Progressing  Goal: Readiness for Transition of Care  Outcome: Progressing     Problem: Infection  Goal: Absence of Infection Signs and Symptoms  Outcome: Progressing     Problem: Diabetes Comorbidity  Goal: Blood Glucose Level Within Targeted Range  Outcome: Progressing     Problem: Acute Kidney Injury/Impairment  Goal: Fluid and Electrolyte Balance  Outcome: Progressing  Goal: Improved Oral Intake  Outcome: Progressing  Goal: Effective Renal Function  Outcome: Progressing     Problem: Fall Injury Risk  Goal: Absence of Fall and Fall-Related Injury  Outcome: Progressing     Problem: Pain Acute  Goal: Optimal Pain Control and Function  Outcome: Progressing     Problem: Anemia  Goal: Anemia Symptom Improvement  Outcome: Progressing     Problem: Wound  Goal: Optimal Coping  Outcome: Progressing  Goal: Optimal Functional Ability  Outcome: Progressing  Goal: Absence of Infection Signs and Symptoms  Outcome: Progressing  Goal: Improved Oral Intake  Outcome: Progressing  Goal: Optimal Pain Control and Function  Outcome: Progressing  Goal: Skin Health and Integrity  Outcome: Progressing  Goal: Optimal Wound Healing  Outcome: Progressing

## 2024-06-19 NOTE — PROGRESS NOTES
[facility] Surg  Otorhinolaryngology-Head & Neck Surgery  Progress Note    Subjective:     Post-Op Info:  Procedure(s) (LRB):  CONTROL OF EPISTAXIS, POSTERIOR, USING NASAL PACKING OR CAUTERIZATION (Bilateral)   1 Day Post-Op  Hospital Day: 8     Interval History: No bleeding overnight.    Medications:  Continuous Infusions:  Scheduled Meds:   allopurinoL  100 mg Oral Daily    amoxicillin-clavulanate 500-125mg  1 tablet Oral BID    atorvastatin  80 mg Oral QHS    bumetanide  2 mg Oral Every other day    ferrous gluconate  324 mg Oral Daily with breakfast    metoprolol succinate  25 mg Oral Daily    sodium chloride  2 spray Each Nostril TID    warfarin  5 mg Oral Daily     PRN Meds:  Current Facility-Administered Medications:     0.9%  NaCl infusion (for blood administration), , Intravenous, Q24H PRN    0.9%  NaCl infusion (for blood administration), , Intravenous, Q24H PRN    acetaminophen, 650 mg, Oral, Q4H PRN    aluminum-magnesium hydroxide-simethicone, 30 mL, Oral, QID PRN    dextrose 10%, 12.5 g, Intravenous, PRN    dextrose 10%, 25 g, Intravenous, PRN    glucagon (human recombinant), 1 mg, Intramuscular, PRN    glucose, 16 g, Oral, PRN    glucose, 24 g, Oral, PRN    insulin aspart U-100, 0-5 Units, Subcutaneous, QID (AC + HS) PRN    naloxone, 0.02 mg, Intravenous, PRN    ondansetron, 4 mg, Intravenous, Q8H PRN    oxyCODONE, 5 mg, Oral, Q6H PRN    oxyCODONE-acetaminophen, 2 tablet, Oral, Q6H PRN    polyethylene glycol, 17 g, Oral, BID PRN    prochlorperazine, 5 mg, Intravenous, Q6H PRN    senna-docusate 8.6-50 mg, 1 tablet, Oral, Daily PRN    sodium chloride 0.9%, 10 mL, Intravenous, Q6H PRN    traZODone, 50 mg, Oral, Nightly PRN     Review of patient's allergies indicates:   Allergen Reactions    Lisinopril     Losartan      Intolerance- elevates potassium level     Objective:     Vital Signs (24h Range):  Temp:  [97 °F (36.1 °C)-98.1 °F (36.7 °C)] 97.5 °F (36.4 °C)  Pulse:  [50-69] 59  Resp:  [12-20]  20  SpO2:  [93 %-100 %] 93 %  BP: (114-175)/(42-74) 114/42       Lines/Drains/Airways       Peripheral Intravenous Line  Duration                  Peripheral IV - Single Lumen 06/14/24 0011 22 G Anterior;Right Forearm 5 days                     Physical Exam  Oropharynx clear without blood  Dissolvable packing in left nares  Hemostatic in nose     Significant Labs:  CBC:   Recent Labs   Lab 06/19/24  0455   WBC 6.57   RBC 2.95*   HGB 8.6*   HCT 27.4*      MCV 93   MCH 29.2   MCHC 31.4*       Significant Diagnostics:  None  Assessment/Plan:     Recurrent epistaxis  83M w extensive PMH, known to ENT service for refractory epistaxis status post bilateral SP ligation followed by bilateral embolization, cautery on the left septum for persistent bleeding on 11/03/2023 and again recently on 12/18/23. He recently underwent left AEA ligation on 2/28/24 and nasal cautery on 5/21. Presents to ED w further bleeding, rhinorocket placed and remains hemostatic. Supratherapeutic INR.    S/p cauterization in OR yesterday. Dissolvable packing in left nares.    Okay to discharge from ENT standpoint. Will message clinic for follow up next week.    Continue BID nasal saline spray at discharge.          Yenifer Sandoval MD  Otorhinolaryngology-Head & Neck Surgery  [facility] Surg

## 2024-06-19 NOTE — SUBJECTIVE & OBJECTIVE
Interval History: No bleeding overnight.    Medications:  Continuous Infusions:  Scheduled Meds:   allopurinoL  100 mg Oral Daily    amoxicillin-clavulanate 500-125mg  1 tablet Oral BID    atorvastatin  80 mg Oral QHS    bumetanide  2 mg Oral Every other day    ferrous gluconate  324 mg Oral Daily with breakfast    metoprolol succinate  25 mg Oral Daily    sodium chloride  2 spray Each Nostril TID    warfarin  5 mg Oral Daily     PRN Meds:  Current Facility-Administered Medications:     0.9%  NaCl infusion (for blood administration), , Intravenous, Q24H PRN    0.9%  NaCl infusion (for blood administration), , Intravenous, Q24H PRN    acetaminophen, 650 mg, Oral, Q4H PRN    aluminum-magnesium hydroxide-simethicone, 30 mL, Oral, QID PRN    dextrose 10%, 12.5 g, Intravenous, PRN    dextrose 10%, 25 g, Intravenous, PRN    glucagon (human recombinant), 1 mg, Intramuscular, PRN    glucose, 16 g, Oral, PRN    glucose, 24 g, Oral, PRN    insulin aspart U-100, 0-5 Units, Subcutaneous, QID (AC + HS) PRN    naloxone, 0.02 mg, Intravenous, PRN    ondansetron, 4 mg, Intravenous, Q8H PRN    oxyCODONE, 5 mg, Oral, Q6H PRN    oxyCODONE-acetaminophen, 2 tablet, Oral, Q6H PRN    polyethylene glycol, 17 g, Oral, BID PRN    prochlorperazine, 5 mg, Intravenous, Q6H PRN    senna-docusate 8.6-50 mg, 1 tablet, Oral, Daily PRN    sodium chloride 0.9%, 10 mL, Intravenous, Q6H PRN    traZODone, 50 mg, Oral, Nightly PRN     Review of patient's allergies indicates:   Allergen Reactions    Lisinopril     Losartan      Intolerance- elevates potassium level     Objective:     Vital Signs (24h Range):  Temp:  [97 °F (36.1 °C)-98.1 °F (36.7 °C)] 97.5 °F (36.4 °C)  Pulse:  [50-69] 59  Resp:  [12-20] 20  SpO2:  [93 %-100 %] 93 %  BP: (114-175)/(42-74) 114/42       Lines/Drains/Airways       Peripheral Intravenous Line  Duration                  Peripheral IV - Single Lumen 06/14/24 0011 22 G Anterior;Right Forearm 5 days                     Physical  Exam  Oropharynx clear without blood  Dissolvable packing in left nares  Hemostatic in nose     Significant Labs:  CBC:   Recent Labs   Lab 06/19/24  0455   WBC 6.57   RBC 2.95*   HGB 8.6*   HCT 27.4*      MCV 93   MCH 29.2   MCHC 31.4*       Significant Diagnostics:  None

## 2024-06-19 NOTE — CONSULTS
PHARMACY CONSULT NOTE: WARFARIN  Dariel Urbina is a 83 y.o. male on warfarin therapy for Mechanical Heart Valve and Atrial Fibrillation. PharmD has been consulted for warfarin dosing.    Current order: Warfarin 5 mg daily  Home dose: as above  Coumadin clinic enrollment: Active  INR goal: 2-3    Lab Results   Component Value Date    INR 1.3 (H) 06/19/2024    INR 1.2 06/18/2024    INR 1.1 06/17/2024     Significant drug interactions: Allopurinol and Augmentin may enhance the anticoagulant effect of Warfarin  Diet: Adult Regular with Boost supplements     Recommendation(s):   INR slowly trending up 1.2 > 1.3  Given history of epistaxis, will be cautious and continue current Warfarin dose of 5 mg daily  INR daily  Pharmacy will continue to follow and monitor warfarin    Thank you for the consult,  Linda Zavala, PharmD  Ext - [phone]    **Note: Consults are reviewed Monday-Friday 7:00am-3:30pm. THE ABOVE RECOMMENDATIONS ARE ONLY SUGGESTED.THE RECOMMENDATIONS SHOULD BE CONSIDERED IN CONJUNCTION WITH ALL PATIENT FACTORS. **

## 2024-06-20 NOTE — PROGRESS NOTES
Pt represented to ED w/ recurrent nose bleed. S/p cauterization in OR yesterday. Dissolvable packing in left nares.     Will r/s f/u & calendar has been updated.

## 2024-06-20 NOTE — PLAN OF CARE
[facility] - Med Surg  Discharge Final Note    Primary Care Provider: Devon Langston Jr., MD    Expected Discharge Date: 6/19/2024      Patient discharged to home with no needs. Follow up appointment placed in AVS. Discharge Plan A and Plan B have been determined by review of patient's clinical status, future medical and therapeutic needs, and coverage/benefits for post-acute care in coordination with multidisciplinary team members.  Final Discharge Note (most recent)       Final Note - 06/20/24 0826          Final Note    Assessment Type Final Discharge Note (P)      Anticipated Discharge Disposition Home or Self Care (P)      Hospital Resources/Appts/Education Provided Appointments scheduled and added to AVS (P)         Post-Acute Status    Coverage [employer] (P)      Other Status No Post-Acute Service Needs (P)      Discharge Delays None known at this time (P)                      Important Message from Medicare  Important Message from Medicare regarding Discharge Appeal Rights: Other (comments)     Date IMM was signed: 06/19/24  Time IMM was signed: 1325    Contact Info       Devon Langston Jr., MD   Specialty: Internal Medicine   Relationship: PCP - General    [address]  [facility] [address]   Phone: [phone]       Next Steps: Follow up            NAHID Lucas  Case Management  [phone]

## 2024-06-21 ENCOUNTER — OFFICE VISIT (OUTPATIENT)
Dept: OPTOMETRY | Facility: CLINIC | Age: 84
End: 2024-06-21
Payer: COMMERCIAL

## 2024-06-21 DIAGNOSIS — E11.9 TYPE 2 DIABETES MELLITUS WITHOUT OPHTHALMIC MANIFESTATIONS: ICD-10-CM

## 2024-06-21 DIAGNOSIS — H52.4 PRESBYOPIA OF BOTH EYES: Primary | ICD-10-CM

## 2024-06-21 PROCEDURE — 92015 DETERMINE REFRACTIVE STATE: CPT | Mod: S$GLB,,, | Performed by: OPTOMETRIST

## 2024-06-21 PROCEDURE — 92014 COMPRE OPH EXAM EST PT 1/>: CPT | Mod: S$GLB,,, | Performed by: OPTOMETRIST

## 2024-06-21 PROCEDURE — 99999 PR PBB SHADOW E&M-EST. PATIENT-LVL III: CPT | Mod: PBBFAC,,, | Performed by: OPTOMETRIST

## 2024-06-24 NOTE — PROGRESS NOTES
6/24- Pt's wife and called pt's nose started bleeding on Saturday and stopped, then it resumed on Sunday and stopped. His nose has not bled so far today (6/24). Pt's wife wants to know if he needs to take his Coumadin for tonight (6/24) or if the pt needs to hold instead.

## 2024-06-25 ENCOUNTER — LAB VISIT (OUTPATIENT)
Dept: LAB | Facility: HOSPITAL | Age: 84
End: 2024-06-25
Attending: INTERNAL MEDICINE
Payer: MEDICARE

## 2024-06-25 ENCOUNTER — OUTPATIENT CASE MANAGEMENT (OUTPATIENT)
Dept: ADMINISTRATIVE | Facility: OTHER | Age: 84
End: 2024-06-25
Payer: MEDICARE

## 2024-06-25 ENCOUNTER — OFFICE VISIT (OUTPATIENT)
Dept: OTOLARYNGOLOGY | Facility: CLINIC | Age: 84
End: 2024-06-25
Payer: MEDICARE

## 2024-06-25 ENCOUNTER — OFFICE VISIT (OUTPATIENT)
Dept: INTERNAL MEDICINE | Facility: CLINIC | Age: 84
End: 2024-06-25
Payer: MEDICARE

## 2024-06-25 ENCOUNTER — ANTI-COAG VISIT (OUTPATIENT)
Dept: CARDIOLOGY | Facility: CLINIC | Age: 84
End: 2024-06-25
Payer: MEDICARE

## 2024-06-25 VITALS
HEART RATE: 50 BPM | SYSTOLIC BLOOD PRESSURE: 160 MMHG | BODY MASS INDEX: 26.29 KG/M2 | WEIGHT: 158 LBS | DIASTOLIC BLOOD PRESSURE: 70 MMHG

## 2024-06-25 VITALS
DIASTOLIC BLOOD PRESSURE: 70 MMHG | WEIGHT: 158.31 LBS | OXYGEN SATURATION: 90 % | SYSTOLIC BLOOD PRESSURE: 160 MMHG | HEART RATE: 50 BPM | BODY MASS INDEX: 26.38 KG/M2 | HEIGHT: 65 IN

## 2024-06-25 DIAGNOSIS — R79.1 SUPRATHERAPEUTIC INR: ICD-10-CM

## 2024-06-25 DIAGNOSIS — R04.0 RECURRENT EPISTAXIS: Primary | ICD-10-CM

## 2024-06-25 DIAGNOSIS — J34.89 NASAL SEPTAL PERFORATION: ICD-10-CM

## 2024-06-25 DIAGNOSIS — I10 BENIGN ESSENTIAL HTN: ICD-10-CM

## 2024-06-25 DIAGNOSIS — Z09 HOSPITAL DISCHARGE FOLLOW-UP: ICD-10-CM

## 2024-06-25 DIAGNOSIS — Z79.01 CHRONIC ANTICOAGULATION: ICD-10-CM

## 2024-06-25 DIAGNOSIS — N18.32 STAGE 3B CHRONIC KIDNEY DISEASE: ICD-10-CM

## 2024-06-25 DIAGNOSIS — R04.0 EPISTAXIS: Primary | ICD-10-CM

## 2024-06-25 DIAGNOSIS — Z95.2 H/O MECHANICAL AORTIC VALVE REPLACEMENT: ICD-10-CM

## 2024-06-25 DIAGNOSIS — D62 ACUTE BLOOD LOSS ANEMIA: ICD-10-CM

## 2024-06-25 DIAGNOSIS — Z79.01 CHRONIC ANTICOAGULATION: Primary | ICD-10-CM

## 2024-06-25 DIAGNOSIS — Z79.01 LONG TERM (CURRENT) USE OF ANTICOAGULANTS: ICD-10-CM

## 2024-06-25 LAB
INR PPP: 1.5 (ref 0.8–1.2)
PROTHROMBIN TIME: 16.3 SEC (ref 9–12.5)

## 2024-06-25 PROCEDURE — 99213 OFFICE O/P EST LOW 20 MIN: CPT | Mod: HCNC,S$GLB,, | Performed by: STUDENT IN AN ORGANIZED HEALTH CARE EDUCATION/TRAINING PROGRAM

## 2024-06-25 PROCEDURE — 1101F PT FALLS ASSESS-DOCD LE1/YR: CPT | Mod: HCNC,CPTII,S$GLB, | Performed by: STUDENT IN AN ORGANIZED HEALTH CARE EDUCATION/TRAINING PROGRAM

## 2024-06-25 PROCEDURE — 36415 COLL VENOUS BLD VENIPUNCTURE: CPT | Mod: HCNC | Performed by: INTERNAL MEDICINE

## 2024-06-25 PROCEDURE — 1111F DSCHRG MED/CURRENT MED MERGE: CPT | Mod: HCNC,CPTII,S$GLB, | Performed by: STUDENT IN AN ORGANIZED HEALTH CARE EDUCATION/TRAINING PROGRAM

## 2024-06-25 PROCEDURE — 1159F MED LIST DOCD IN RCRD: CPT | Mod: HCNC,CPTII,S$GLB, | Performed by: STUDENT IN AN ORGANIZED HEALTH CARE EDUCATION/TRAINING PROGRAM

## 2024-06-25 PROCEDURE — 99214 OFFICE O/P EST MOD 30 MIN: CPT | Mod: HCNC,S$GLB,, | Performed by: STUDENT IN AN ORGANIZED HEALTH CARE EDUCATION/TRAINING PROGRAM

## 2024-06-25 PROCEDURE — 99999 PR PBB SHADOW E&M-EST. PATIENT-LVL IV: CPT | Mod: PBBFAC,HCNC,, | Performed by: STUDENT IN AN ORGANIZED HEALTH CARE EDUCATION/TRAINING PROGRAM

## 2024-06-25 PROCEDURE — 1126F AMNT PAIN NOTED NONE PRSNT: CPT | Mod: HCNC,CPTII,S$GLB, | Performed by: STUDENT IN AN ORGANIZED HEALTH CARE EDUCATION/TRAINING PROGRAM

## 2024-06-25 PROCEDURE — 3077F SYST BP >= 140 MM HG: CPT | Mod: HCNC,CPTII,S$GLB, | Performed by: STUDENT IN AN ORGANIZED HEALTH CARE EDUCATION/TRAINING PROGRAM

## 2024-06-25 PROCEDURE — 3288F FALL RISK ASSESSMENT DOCD: CPT | Mod: HCNC,CPTII,S$GLB, | Performed by: STUDENT IN AN ORGANIZED HEALTH CARE EDUCATION/TRAINING PROGRAM

## 2024-06-25 PROCEDURE — 1160F RVW MEDS BY RX/DR IN RCRD: CPT | Mod: HCNC,CPTII,S$GLB, | Performed by: STUDENT IN AN ORGANIZED HEALTH CARE EDUCATION/TRAINING PROGRAM

## 2024-06-25 PROCEDURE — 3078F DIAST BP <80 MM HG: CPT | Mod: HCNC,CPTII,S$GLB, | Performed by: STUDENT IN AN ORGANIZED HEALTH CARE EDUCATION/TRAINING PROGRAM

## 2024-06-25 PROCEDURE — 85610 PROTHROMBIN TIME: CPT | Mod: HCNC | Performed by: INTERNAL MEDICINE

## 2024-06-25 PROCEDURE — 99999 PR PBB SHADOW E&M-EST. PATIENT-LVL III: CPT | Mod: PBBFAC,HCNC,, | Performed by: STUDENT IN AN ORGANIZED HEALTH CARE EDUCATION/TRAINING PROGRAM

## 2024-06-25 RX ORDER — ISOSORBIDE MONONITRATE 60 MG/1
60 TABLET, EXTENDED RELEASE ORAL NIGHTLY
Qty: 90 TABLET | Refills: 3 | Status: SHIPPED | OUTPATIENT
Start: 2024-06-25

## 2024-06-25 NOTE — PROGRESS NOTES
[facility]    2024 12:47 PM    Assessment/Plan:    Patient presents today with subtherapeutic  INR.    Assessment of patient findings and chart review: Pt resumed warfarin per calendar & seen by MD today after d/c from hospital.     Recommendation for patient's warfarin regimen: Boost dose today to 7.5mg then resume current maintenance dose    Recommend repeat INR in 1 week  _________________________________________________________________    Dariel Urbina (83 y.o.) is followed by the [facility].    Anticoagulation Summary  As of 2024      INR goal:  2.0-3.0   TTR:  67.9% (11.8 y)   INR used for dosin.5 (2024)   Warfarin maintenance plan:  5 mg (5 mg x 1) every day   Weekly warfarin total:  35 mg   Plan last modified:  Laurie Patino, PharmD (2024)   Next INR check:  2024   Target end date:      Indications    Chronic anticoagulation [Z79.01]  H/O mechanical aortic valve replacement [Z95.2]                 Anticoagulation Episode Summary       INR check location:  Clinic Lab    Preferred lab:      Send INR reminders to:  [facility] COUMADIN MONITORING POOL    Comments:  [facility] - Internal Med Clinic only Mon - Thu // carpel tunnel release  - target low end of range          Anticoagulation Care Providers       Provider Role Specialty Phone number    Devon Garnica MD [facility] Cardiology [phone]

## 2024-06-25 NOTE — PROGRESS NOTES
"Outpatient Care Management  Initial Patient Assessment    Patient: Dariel Urbina  MRN: [id number]  Date of Service: 06/25/2024  Completed by: Tmaiko Winchester RN  Referral Date: 05/30/2024  Date of Eligibility: 5/31/2024  Program:   High Risk  Status: Ongoing  Effective Dates: 6/25/2024 - present  Responsible Staff: Tamiko Winchester, RN        Reason for Visit   Patient presents with    OPCM Enrollment Call       Brief Summary:  Dariel Urbina was referred by Dr. Leslie for anemia. Patient qualifies for program based on high risk score of 89.6%.   Active problem list, medical, surgical and social history reviewed. Active comorbidities include CAD s/p CABG 1990's, mechanical AVR on Warfarin, Afib, gout, DM2, HLD, HTN, CKD4, and recurrent epistaxis. Areas of need identified by patient include "preventing nosebleeds and focusing on his INR level being therapeutic, staying out of the hospital."   Next steps:   Review if Mr. Urbina has been free of epistaxis episodes.  Review INR level, advisement from Coumadin clinic.  Mrs. Guerra agreed to OPCM RN follow up on or around 7/2/24.    Disability Status  Is the patient alert and oriented (person, place, time, and situation)?: Alert and oriented x 4  Hearing Difficulty or Deaf: yes  Visual Difficulty or Blind: yes  Visual and Hearing Needs Conclusion: Mr. Vieira has trouble hearing, denies wearing hearing aids, wears glasses.  Difficulty Concentrating, Remembering or Making Decisions: no  Communication Difficulty: no  Eating/Swallowing Difficulty: yes  Eating/Swallowing: swallowing solid food  Walking or Climbing Stairs Difficulty: no  Walking or Climbing Stairs: -- (none)  Mobility Management: none  Dressing/Bathing Difficulty: yes  Dressing/Bathing: bathing difficulty, requires equipment (shower chair)  Toileting : Independent  Continence : Continence - Not a problem  Difficulty Managing Errands Independently: yes  Equipment Currently Used at Home: shower chair; blood " pressure machine  ADL Conclusion Statement: Mr. Vieira lives with his wife Mari, she drives and accompanies him to all scheduled appts. She reports that Mr. Vieira does not leave the house often due to SOB with ambulation and fear of having an episode of epistaxis that he will not be able to stop. He does not use an assistive device for ambulation, he utilizes a shower chair, denies any difficulty with toileting/continence is not a problem.  Change in Functional Status Since Onset of Current Illness/Injury: no        Spiritual Beliefs  Spiritual, Cultural Beliefs, Lutheran Practices, Values that Affect Care: no      Social History     Socioeconomic History    Marital status:      Spouse name: Ameena    Number of children: 3   Occupational History    Occupation: retired   Tobacco Use    Smoking status: Former     Current packs/day: 0.00     Average packs/day: 1 pack/day for 20.0 years (20.0 ttl pk-yrs)     Types: Cigarettes     Start date: 1960     Quit date: 1980     Years since quittin.8     Passive exposure: Never    Smokeless tobacco: Never   Substance and Sexual Activity    Alcohol use: No    Drug use: No    Sexual activity: Not Currently     Partners: Female     Social Determinants of Health     Financial Resource Strain: Low Risk  (2024)    Overall Financial Resource Strain (CARDIA)     Difficulty of Paying Living Expenses: Not hard at all   Food Insecurity: No Food Insecurity (2024)    Hunger Vital Sign     Worried About Running Out of Food in the Last Year: Never true     Ran Out of Food in the Last Year: Never true   Transportation Needs: No Transportation Needs (2024)    TRANSPORTATION NEEDS     Transportation : No   Physical Activity: Inactive (2024)    Exercise Vital Sign     Days of Exercise per Week: 0 days     Minutes of Exercise per Session: 0 min   Stress: No Stress Concern Present (2024)    [employer] - Occupational Stress  Questionnaire     Feeling of Stress : Only a little   Housing Stability: Low Risk  (6/14/2024)    Housing Stability Vital Sign     Unable to Pay for Housing in the Last Year: No     Homeless in the Last Year: No       Roles and Relationships  Primary Source of Support/Comfort: spouse  Name of Support/Comfort Primary Source: Mari      Advance Directives (For Healthcare)  Advance Directive  (If Adv Dir status is received, view document under Adv Dir in header or Chart Review Media tab): Patient does not have Advance Directive, requests information.  Patient Requests Assistance: Handouts provided        Patient Reported Insurance  Verified current insurance plan:: [employer] Medicare Advantage  [employer] benefits discussed:: Mail Order Pharmacy            6/25/2024    12:24 PM 4/5/2024     8:29 AM 7/26/2023     8:21 AM 4/25/2023     8:16 AM 2/6/2023     2:45 PM 10/19/2022     5:05 PM 6/1/2022     8:06 AM   Depression Patient Health Questionnaire   Over the last two weeks how often have you been bothered by little interest or pleasure in doing things Not at all Not at all Not at all Not at all Not at all Not at all Not at all   Over the last two weeks how often have you been bothered by feeling down, depressed or hopeless Not at all Not at all Not at all Not at all Not at all Not at all Not at all   PHQ-2 Total Score 0 0 0 0 0 0 0       Learning Assessment       06/25/2024 1454 [facility] (6/25/2024 - Present)   Created by Tamiko Winchester RN - RN (Nurse) Status: Complete                 PRIMARY LEARNER     Primary Learner Name:  Mari Ocasio ED - 06/25/2024 1454    Relationship:  Significant Other ED - 06/25/2024 1454    Does the primary learner have any barriers to learning?:  No Barriers ED - 06/25/2024 1454    What is the preferred language of the primary learner?:  English ED - 06/25/2024 1454    Is an  required?:  No ED - 06/25/2024 1454    How does the primary learner prefer to learn new  concepts?:  Listening, Reading ED - 06/25/2024 1454    How often do you need to have someone help you read instructions, pamphlets, or written material from your doctor or pharmacy?:  Never ED - 06/25/2024 1454        CO-LEARNER #1     No question answered        CO-LEARNER #2     No question answered        SPECIAL TOPICS     No question answered        ANSWERED BY:     No question answered        Comments         Edit Tamiko Harley, RN - RN (Nurse)   06/25/2024 1454

## 2024-06-25 NOTE — PROGRESS NOTES
[facility] FOLLOW-UP VISIT      CHIEF COMPLAINT:   Chief Complaint   Patient presents with    Follow-up       HISTORY OF PRESENT ILLNESS: Dariel Urbina is a 83 y.o. male who presents here today for hospital follow-up. Patient was admitted 6/12-6/19 after presenting with recurrent epistaxis. Extensive history of issues with epistaxis S/P multiple procedures, most recently nasal cautery 5/21. On presentation, also noted supratherapeutic INR and anemia Hgb 6.6. Warfarin was held, vitamin K given,  and he received blood transfusion x 3. He initially had packing placed in nare and subsequently went to the OR 6/18 for nasal endoscopy with cautery. Ultimately anemia and INR stabilized and he was able to re-start Warfarin. Bleeding remained controlled and he was discharged 6/19.    Since discharge, patient has been feeling well. Patient had 2 brief episodes of epistaxis on Saturday 6/22 and and again Sunday 6/23. Both episodes lasting 1-2 hours from left nare, scant amount of bleeding per patient. No episodes of bleeding yesterday or today. He is seeing ENT later today. He is also following with anticoagulation clinic.     Patient's BP is 160/70 today; relatively unchanged on repeat. It appears Amlodipine and Imdur were held while in the hospital due to anemia and soft BP.       REVIEW OF SYSTEMS:    ROS as in HPI.       MEDICAL HISTORY:    Past Medical History:   Diagnosis Date    Anemia of chronic renal failure, stage 3 (moderate) 05/27/2015    Anticoagulant long-term use     Atherosclerosis of coronary artery bypass graft of native heart without angina pectoris 09/11/2012    3-27-18 [facility] Two vessel coronary artery disease.   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    Bilateral carotid artery disease 02/09/2017    Bleeding from the nose     Bleeding nose 03/21/2018    Cancer     Cataract     CHF (congestive heart failure)     CKD (chronic kidney disease) stage 3, GFR  30-59 ml/min 05/27/2015    Claudication of left lower extremity 09/17/2014    Colon polyp     Encounter for blood transfusion     Gastroesophageal reflux disease without esophagitis 03/19/2018    Gastrointestinal hemorrhage associated with intestinal diverticulosis 04/01/2018    Glaucoma     H/O mechanical aortic valve replacement 09/17/2014    History of gout 09/26/2012    Hyperparathyroidism due to renal insufficiency 07/27/2015    Internal hemorrhoid 04/03/2018    Long term current use of anticoagulant therapy 09/26/2012    Mechanical heart valve present     Metabolic acidosis with normal anion gap and bicarbonate losses 03/20/2018    Mixed hyperlipidemia 09/26/2012    NSTEMI (non-ST elevated myocardial infarction) 03/21/2018    Obesity, diabetes, and hypertension syndrome 02/23/2016    Paroxysmal atrial fibrillation 02/06/2023    PVD (peripheral vascular disease) 09/11/2012    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      allopurinoL (ZYLOPRIM) 100 MG tablet Take 1 tablet (100 mg total) by mouth once daily. 90 tablet 3    bumetanide (BUMEX) 1 MG tablet Take 2 tablets (2 mg total) by mouth every other day.      ferrous gluconate (FERGON) 324 MG tablet TAKE 1 TABLET EVERY DAY WITH BREAKFAST 90 tablet 3    metoprolol succinate (TOPROL-XL) 25 MG 24 hr tablet Take 1 tablet (25 mg total) by mouth once daily. 90 tablet 3    omega-3 fatty acids/fish oil (FISH OIL-OMEGA-3 FATTY ACIDS) 300-1,000 mg capsule  "Take 1 capsule by mouth once daily.      rosuvastatin (CRESTOR) 40 MG Tab TAKE 1 TABLET EVERY EVENING. 90 tablet 3    sodium chloride (OCEAN) 0.65 % nasal spray 1 spray by Nasal route as needed for Congestion. 44 mL 12    sodium chloride (SALINE NASAL) 0.65 % nasal spray 2 sprays by Nasal route 3 (three) times daily. 44 mL 3    traZODone (DESYREL) 50 MG tablet Take 1 tablet (50 mg total) by mouth every evening. 90 tablet 2    warfarin (COUMADIN) 5 MG tablet TAKE 1/2 TABLET (2.5MG) SATURDAY, THEN TAKE 1 TABLET (5MG) ALL OTHER DAYS OR AS INSTRUCTED BY COUMADIN CLINIC (Patient taking differently: Take 5 mg by mouth every evening.) 90 tablet 3     Current Facility-Administered Medications on File Prior to Visit   Medication Dose Route Frequency Provider Last Rate Last Admin    ceFAZolin 2 g in dextrose 5 % in water (D5W) 50 mL IVPB (MB+)  2 g Intravenous On Call Procedure Yenifer Sandoval MD        HYDROmorphone injection 0.2 mg  0.2 mg Intravenous Q5 Min PRN Saeed Farrell MD        sodium chloride 0.9% flush 10 mL  10 mL Intravenous PRN Saeed Farrell MD             PHYSICAL EXAM:    BP (!) 160/70 (BP Location: Right arm, Patient Position: Sitting, BP Method: Medium (Manual))   Pulse (!) 50   Ht 5' 5" (1.651 m)   Wt 71.8 kg (158 lb 4.6 oz)   SpO2 (!) 90%   BMI 26.34 kg/m²     Physical Exam  Vitals and nursing note reviewed.   Constitutional:       General: He is not in acute distress.     Appearance: Normal appearance. He is not ill-appearing, toxic-appearing or diaphoretic.   HENT:      Head: Normocephalic and atraumatic.      Nose: Nose normal.   Eyes:      Extraocular Movements: Extraocular movements intact.      Conjunctiva/sclera: Conjunctivae normal.      Pupils: Pupils are equal, round, and reactive to light.   Cardiovascular:      Rate and Rhythm: Normal rate and regular rhythm.      Heart sounds: Normal heart sounds. No murmur heard.  Pulmonary:      Effort: Pulmonary effort is normal. No " respiratory distress.      Breath sounds: Normal breath sounds. No wheezing.   Musculoskeletal:         General: No deformity. Normal range of motion.   Skin:     Findings: No lesion or rash.   Neurological:      General: No focal deficit present.      Mental Status: He is alert.      Motor: No weakness.      Gait: Gait normal.   Psychiatric:         Mood and Affect: Mood normal.         Behavior: Behavior normal.         Thought Content: Thought content normal.         Judgment: Judgment normal.           ASSESSMENT & PLAN:    Dariel was seen today for follow-up.    Diagnoses and all orders for this visit:    Recurrent epistaxis  Pt with h/o recurrent epistaxis S/P multiple procedures, recently admitted 6/12-6/19 for left sided epistaxis, now S/P nasal endoscopy with cautery on 6/18.   Brief episodes of bleeding x 2 over the weekend, none past 2 days  F/U with ENT as scheduled    Acute blood loss anemia  Hgb down to 6.6 on 6/12 2/2 epistaxis as above. Received blood transfusion in hospital. Hgb improved to 8-9 range by time of discharge.     Chronic anticoagulation  Supratherapeutic INR  Patient on warfarin for mechanical valve. Epistaxis episodes often associated with supratherapeutic INR.   INR 6.2 on recent admission - was given Vit K, warfarin adjusted by anti-coagulation pharmacy.   INR 1.5 today - under goal of 2-3  Continue F/U with anticoagulation clinic    Benign essential HTN  /70 today. Unchanged on repeat. BP meds held/discontinued in hospital due to blood loss anemia and soft BP.   Restart Imdur - orders placed.     Hospital discharge follow-up  Details as above    Other orders  -     isosorbide mononitrate (IMDUR) 60 MG 24 hr tablet; Take 1 tablet (60 mg total) by mouth every evening.              Indiana Light MD  [facility]

## 2024-06-25 NOTE — PROGRESS NOTES
Subjective:      Dariel is a 83 y.o. male who comes for follow-up of  epistaxis . Since last cautery reports one episode of bleeding. Has been hemostatic over the last 2-3 days. INR pending from today.     He previously undergone bilateral SP ligation followed by bilateral embolization, cautery on the left septum for persistent bleeding on 11/03/2023 and again recently on 12/18/23. He recently underwent left AEA ligation on 2/28/24 and recently underwent nasal endoscopy and control of epistaxis on 6/18/24.    His current sinus regime consists of: Saline.     The assessment of quality and severity of symptoms as measured by the SNOT-22 score is deferred.    The patient's medications, allergies, past medical, surgical, social and family histories were reviewed and updated as appropriate.    ROS     A detailed review of systems was obtained with pertinent positives as per the above HPI, and otherwise negative.        Objective:     BP (!) 160/70   Pulse (!) 50   Wt 71.7 kg (158 lb)   BMI 26.29 kg/m²        Constitutional:   Vital signs are normal. He appears well-developed and well-nourished.     Head:  Normocephalic and atraumatic.     Ears:  Hearing normal to normal and whispered voice; external ear normal without scars, lesions, or masses; ear canal, tympanic membrane, and middle ear normal..   Right Ear: No swelling. Tympanic membrane is not perforated and not bulging. No middle ear effusion.   Left Ear: No swelling. Tympanic membrane is not perforated and not bulging.  No middle ear effusion.     Nose:  Nose normal including turbinates, nasal mucosa, sinuses and nasal septum. No epistaxis.         Mouth/Throat  Oropharynx clear and moist without lesions or asymmetry and normal uvula midline. Normal dentition. No tonsillar abscesses. Tonsillar exudate.      Neck:  Neck normal without thyromegaly masses, asymmetry, normal tracheal structure, crepitus, and tenderness, thyroid normal, trachea normal, phonation  "normal, full range of motion with neck supple and no adenopathy. No stridor present.        Head (right side): No submental adenopathy present.        Head (left side): No submental adenopathy present.     He has no cervical adenopathy.     Pulmonary/Chest:   No stridor.       Procedure    None    Data Reviewed    WBC (K/uL)   Date Value   06/19/2024 8.96     Eosinophil % (%)   Date Value   06/18/2024 0.0     Eos # (K/uL)   Date Value   06/18/2024 0.0     Platelets (K/uL)   Date Value   06/19/2024 161     Glucose (mg/dL)   Date Value   06/15/2024 87     No results found for: "IGE"    No sinus imaging available.    Assessment:     1. Epistaxis    2. Stage 3b chronic kidney disease    3. Nasal septal perforation       Plan:     - Nasal saline   - RTC 1 month    Jono Russell MD     "

## 2024-06-25 NOTE — PROGRESS NOTES
HPI    ALEXANDRA: 01/06/2023   Chief complaint (CC): 83 yr old Male in clinic today for Routine Eye Exam  Glasses? No  Contacts? No  H/o eye surgery, injections or laser: Cataract Sx OU 11/13/2022 and   12/04/2022  H/o eye injury: No  (-) Flashes (-)  Floaters (-) Mucous   (-)  Tearing (-) Itching (-) Burning   (-) Headaches (-) Eye Pain/discomfort (-) Irritation   (-)  Redness (-) Double vision (-) Blurry vision  Last edited by Chandrika Larkin, OD on 6/21/2024  2:24 PM.            Assessment /Plan     For exam results, see Encounter Report.    Presbyopia of both eyes    Type 2 diabetes mellitus without ophthalmic manifestations      1.   Eyeglass Final Rx       Eyeglass Final Rx         Sphere Cylinder Axis Add    Right [place]   +2.50    Left -0.50 +1.00 170 +2.50      Type: Bifocal    Expiration Date: 6/21/2025                   2. No retinopathy noted today.  Continued control with primary care physician and annual comprehensive eye exam.     RTC in 1 year for annual eye exam unless changes noted sooner.

## 2024-06-25 NOTE — PATIENT INSTRUCTIONS
Restart Imdur (isosorbide mononitrate). Check BP at home to ensure its not too high or low. Goal between 110-140/60-90.

## 2024-06-25 NOTE — LETTER
Dariel Urbina  [address] [address]    Dear Dariel Urbina,     Welcome to [facility] Out[facility]. We are here to assist patients with multiple long-term (chronic) conditions who often need more personalized healthcare.    It was a pleasure talking with you today. My name is Tamiko Lyons RN. I look forward to working with you as your Care Manager. I will be contacting you by telephone routinely to help coordinate care and resolve issues.    My goal is to help you function at the healthiest and highest level possible. You can contact me directly at [phone].    As an [facility] patient with [employer], some of the services we provide, at no cost to you, include:     Development of an individualized care plan with a Registered Nurse   Connection with a   Assistance from a Community Health Worker  Connection with available resources and services    Coordinate communication among your care team members   Provide coaching and education  Help you understand your doctor's treatment plan  Help you obtain information about your insurance coverage.    All services provided by [facility] Outpatient Care Managers and other care team members are coordinated with and communicated to your primary care team.      As part of your enrollment, you will be receiving education materials and more information about these services in your [facility] account, by phone, or through the mail. If you do not wish to participate or receive information, you can Opt Out by contacting our office at [phone].      Sincerely,        Tamiko Lyons RN  [facility]   Outpatient Care Management

## 2024-07-01 ENCOUNTER — LAB VISIT (OUTPATIENT)
Dept: LAB | Facility: HOSPITAL | Age: 84
End: 2024-07-01
Attending: INTERNAL MEDICINE
Payer: MEDICARE

## 2024-07-01 ENCOUNTER — ANTI-COAG VISIT (OUTPATIENT)
Dept: CARDIOLOGY | Facility: CLINIC | Age: 84
End: 2024-07-01
Payer: MEDICARE

## 2024-07-01 DIAGNOSIS — Z79.01 CHRONIC ANTICOAGULATION: Primary | ICD-10-CM

## 2024-07-01 DIAGNOSIS — Z79.01 LONG TERM (CURRENT) USE OF ANTICOAGULANTS: ICD-10-CM

## 2024-07-01 DIAGNOSIS — Z95.2 H/O MECHANICAL AORTIC VALVE REPLACEMENT: ICD-10-CM

## 2024-07-01 LAB
INR PPP: 1.6 (ref 0.8–1.2)
PROTHROMBIN TIME: 16.9 SEC (ref 9–12.5)

## 2024-07-01 PROCEDURE — 36415 COLL VENOUS BLD VENIPUNCTURE: CPT | Mod: HCNC | Performed by: INTERNAL MEDICINE

## 2024-07-01 PROCEDURE — 93793 ANTICOAG MGMT PT WARFARIN: CPT | Mod: S$GLB,,,

## 2024-07-01 PROCEDURE — 85610 PROTHROMBIN TIME: CPT | Mod: HCNC | Performed by: INTERNAL MEDICINE

## 2024-07-01 NOTE — PROGRESS NOTES
[facility]    2024 1:14 PM    Assessment/Plan:    Patient presents today with subtherapeutic  INR.    Assessment of patient findings and chart review: Missed dose.     Recommendation for patient's warfarin regimen: Boost dose today to 7.5mg then resume current maintenance dose    Recommend repeat INR in 1 week  _________________________________________________________________    Dariel Urbina (83 y.o.) is followed by the [facility].    Anticoagulation Summary  As of 2024      INR goal:  2.0-3.0   TTR:  67.8% (11.8 y)   INR used for dosin.6 (2024)   Warfarin maintenance plan:  5 mg (5 mg x 1) every day   Weekly warfarin total:  35 mg   Plan last modified:  Laurie Patino, PharmD (2024)   Next INR check:  2024   Target end date:      Indications    Chronic anticoagulation [Z79.01]  H/O mechanical aortic valve replacement [Z95.2]                 Anticoagulation Episode Summary       INR check location:  Clinic Lab    Preferred lab:      Send INR reminders to:  [facility] COUMADIN MONITORING POOL    Comments:  [facility] only Mon - Thu // carpel tunnel release  - target low end of range          Anticoagulation Care Providers       Provider Role Specialty Phone number    Devon Garnica MD [facility] [phone]

## 2024-07-02 ENCOUNTER — OUTPATIENT CASE MANAGEMENT (OUTPATIENT)
Dept: ADMINISTRATIVE | Facility: OTHER | Age: 84
End: 2024-07-02
Payer: MEDICARE

## 2024-07-02 NOTE — PROGRESS NOTES
7/2/2024  1st attempt to complete Follow-Up  for Outpatient Care Management, left message. Will send portal message with unable to assess letter.

## 2024-07-02 NOTE — LETTER
Dariel Urbina  [address] [address]      Dear Dariel Urbina,     I am your nurse with [facility]. I was unsuccessful in reaching you today. At your earliest convenience, I would like to discuss your healthcare progress.      Please contact me at [phone] from 8:00AM to 4:30 PM on Monday thru Friday.     As you know, [facility] is a program offered to you through [facility] where a nurse is available 24/7 to answer questions or provide medical advice, their number is [phone].    Thanks,      Tamiko Lyons, RN  Outpatient Care Management

## 2024-07-03 ENCOUNTER — OUTPATIENT CASE MANAGEMENT (OUTPATIENT)
Dept: ADMINISTRATIVE | Facility: OTHER | Age: 84
End: 2024-07-03
Payer: MEDICARE

## 2024-07-03 ENCOUNTER — PATIENT OUTREACH (OUTPATIENT)
Dept: ADMINISTRATIVE | Facility: OTHER | Age: 84
End: 2024-07-03
Payer: MEDICARE

## 2024-07-03 DIAGNOSIS — N18.9 CHRONIC KIDNEY DISEASE, UNSPECIFIED CKD STAGE: Primary | ICD-10-CM

## 2024-07-03 NOTE — PROGRESS NOTES
Outpatient Care Management  Plan of Care Follow Up Visit    Patient: Dariel Urbina  MRN: [id number]  Date of Service: 07/03/2024  Completed by: Tamiko Lyons RN  Referral Date: 05/30/2024    Reason for Visit   Patient presents with    OPCM RN Follow Up Call       Brief Summary: OPCM RN followed up with Mrs. Guerra today for care plan review.    Next Steps:   Review INR level.  Review if Mr. Urbina has had any epistaxis episodes.   Mrs. Guerra agreed to OPCM RN follow up on or around 7/17/24.

## 2024-07-03 NOTE — PROGRESS NOTES
Community Health Worker attempted to contact patient via telephone to assist with SDOH. Left a voicemail message on patient's telephone number [phone].  Will attempt again at a later date.

## 2024-07-04 NOTE — DISCHARGE SUMMARY
DISCHARGE SUMMARY  Hospital Medicine    Team: [facility]    Patient Name: Dariel Urbina  YOB: 1940    Admit Date: 2024    Discharge Date: 2024    Discharge Attending Physician: Aga Hernandez     Admitting Resident    Diagnoses:  Active Hospital Problems    Diagnosis  POA    *Epistaxis [R04.0]  Yes    Coronary artery disease involving native coronary artery of native heart without angina pectoris [I25.10]  Yes    Acute blood loss anemia [D62]  Yes    Recurrent epistaxis [R04.0]  Yes    H/O mechanical aortic valve replacement [Z95.2]  Not Applicable    Type 2 diabetes mellitus with stage 4 chronic kidney disease, without long-term current use of insulin [E11.22, N18.4]  Yes     Chronic    Chronic anticoagulation [Z79.01]  Not Applicable      Resolved Hospital Problems   No resolved problems to display.       Discharged Condition: admit problems have stabilized       Patient seen and examined on date of discharge. 45 min spent on face to face time and medical decision making.     Physical Exam  Vitals and nursing note reviewed.   Constitutional:       General: He is not in acute distress.     Appearance: He is not diaphoretic.   HENT:      Head: Normocephalic and atraumatic.      Nose:      Comments: Left nare rhino-rocket  Eyes:      General: No scleral icterus.  Cardiovascular:      Rate and Rhythm: Regular rhythm. Bradycardia present.      Comments: Mechanical click  Pulmonary:      Effort: Pulmonary effort is normal. No respiratory distress.      Breath sounds: No wheezing or rales.   Abdominal:      General: Bowel sounds are normal.      Palpations: Abdomen is soft.   Musculoskeletal:      Right lower leg: No edema.      Left lower le+ Pitting Edema present.   Skin:     General: Skin is warm and dry.   Neurological:      General: No focal deficit present.      Mental Status: He is alert and oriented to person, place, and time.        HOSPITAL COURSE:      Initial  Presentation:    82 y/o WM CAD s/p CABG, MR s/p mitraclib, Afib, Type 2 DM, CKD4,  s/p mechanical aortic valve placement on warfarin anticoagulation w/ hx of recurrent epistaxis presents to the ED for epistaxis.  He reports painless bleeding from left nare onset @ 0400 today.  He did not want to come to the hospital, but wife advised him to.  He is found with supratherapeutic INR 4.4.  His last dose of warfarin was yesterday.      ENT has placed rhino rocket to left nare. Consult note pending.  Patient reports its always left nare that has recurrent bleeding. He has Hemoglobin 6.6 and 1unit PRBC ordered in ED and infusing during my interview.      Pt has no complaints of chest pain/dyspnea, no nausea/vomiting, no hematemesis, hematuria, or other sites of bruising or bleeding.      He was recently admitted [facility] for same reason (5/29-6/3), INR was >8 last admission. Required PRBC transfusion.  Hospital course was complicated by JIM on CKD and acute on chronic heart failure exacerbation.        Course of Principle Problem for Admission:       Epistaxis  -recurrent left nare bleeding with supratherapeutic INR causing acute blood loss anemia.  2 U pRBC provided    -s/p Rhinorocket placement in ED  - w/ afrin spray and tranexamic acid intranasally   - ENT following  - 6/18: ENT removed remove packing in OR   - coumadin restarted without recurrence of bleeding.      -per last ENT inpatient consult recs   Avoid use of nasal cannula if any hypoxia develops - use only humidified o2 via face mask/face tent  For recurrent bleeding - given presence of rhino rocket - would contact ENT on-call  Prior ENT notes have mentioned possible need for angiography and embolization with IR.     CONSULTS: ENT      Disposition:  Home       Future Scheduled Appointments:  Future Appointments   Date Time Provider Department Center   7/8/2024  7:10 AM LAB, APPOINTMENT [facility]   7/8/2024  7:20 AM LAB, SPECIMEN ANTONETTE  INT[facility] SPLABIM [facility]   7/8/2024  7:40 AM LAB, APPOINTMENT [facility] INTMED NOM LAB IM Abdulkadir [facility]vis [facility]   7/11/2024  2:00 PM Mabel Birmingham MD [facility] NEPHRO [facility]   7/16/2024 10:00 AM Cody Aranda MD [facility] CARDIO [facility]   7/30/2024  9:00 AM Jono Russell MD [facility] HNSO [facility]   8/6/2024 10:00 AM Devon Langston Jr., MD [facility] Abd[facility]adir [facility] PCW       Follow-up Plans from This Hospitalization:  ENT    Discharge Medication List:          Medication List        CHANGE how you take these medications      * DEEP SEA NASAL 0.65 % nasal spray  Generic drug: sodium chloride  2 sprays by Nasal route 3 (three) times daily.  What changed: Another medication with the same name was added. Make sure you understand how and when to take each.     * DEEP SEA NASAL 0.65 % nasal spray  Generic drug: sodium chloride  1 spray by Nasal route as needed for Congestion.  What changed: You were already taking a medication with the same name, and this prescription was added. Make sure you understand how and when to take each.     traZODone 50 MG tablet  Commonly known as: DESYREL  Take 1 tablet (50 mg total) by mouth every evening.  What changed:   when to take this  reasons to take this     warfarin 5 MG tablet  Commonly known as: COUMADIN  TAKE 1/2 TABLET (2.5MG) SATURDAY, THEN TAKE 1 TABLET (5MG) ALL OTHER DAYS OR AS INSTRUCTED BY COUMADIN CLINIC  What changed:   how much to take  how to take this  when to take this  additional instructions           * This list has 2 medication(s) that are the same as other medications prescribed for you. Read the directions carefully, and ask your doctor or other care provider to review them with you.                CONTINUE taking these medications      acetaminophen 500 MG tablet  Commonly known as: TYLENOL     allopurinoL 100 MG tablet  Commonly known as: ZYLOPRIM  Take 1 tablet (100 mg total) by mouth once daily.     bumetanide 1 MG tablet  Commonly known as: BUMEX  Take 2  tablets (2 mg total) by mouth every other day.     ferrous gluconate 324 MG tablet  Commonly known as: FERGON  TAKE 1 TABLET EVERY DAY WITH BREAKFAST     fish oil-omega-3 fatty acids 300-1,000 mg capsule     metoprolol succinate 25 MG 24 hr tablet  Commonly known as: TOPROL-XL  Take 1 tablet (25 mg total) by mouth once daily.     rosuvastatin 40 MG Tab  Commonly known as: CRESTOR  TAKE 1 TABLET EVERY EVENING.            STOP taking these medications      amLODIPine 5 MG tablet  Commonly known as: NORVASC     isosorbide mononitrate 60 MG 24 hr tablet  Commonly known as: IMDUR               Where to Get Your Medications        These medications were sent to [facility] [address] [address]      Hours: Always Open Phone: [phone]   DEEP SEA NASAL 0.65 % nasal spray         Patient Instructions:  No discharge procedures on file.    Signing Physician:  Aga Hernandez MD

## 2024-07-08 ENCOUNTER — ANTI-COAG VISIT (OUTPATIENT)
Dept: CARDIOLOGY | Facility: CLINIC | Age: 84
End: 2024-07-08
Payer: MEDICARE

## 2024-07-08 ENCOUNTER — LAB VISIT (OUTPATIENT)
Dept: LAB | Facility: HOSPITAL | Age: 84
End: 2024-07-08
Attending: INTERNAL MEDICINE
Payer: MEDICARE

## 2024-07-08 DIAGNOSIS — Z79.01 LONG TERM (CURRENT) USE OF ANTICOAGULANTS: ICD-10-CM

## 2024-07-08 DIAGNOSIS — Z95.2 H/O MECHANICAL AORTIC VALVE REPLACEMENT: ICD-10-CM

## 2024-07-08 DIAGNOSIS — Z79.01 CHRONIC ANTICOAGULATION: Primary | ICD-10-CM

## 2024-07-08 PROBLEM — E79.0 HYPERURICEMIA: Status: ACTIVE | Noted: 2024-07-08

## 2024-07-08 PROBLEM — D63.1 ANEMIA OF CHRONIC RENAL FAILURE: Status: ACTIVE | Noted: 2024-07-08

## 2024-07-08 PROBLEM — N20.0 NEPHROLITHIASIS: Status: ACTIVE | Noted: 2024-07-08

## 2024-07-08 PROBLEM — N18.9 ANEMIA OF CHRONIC RENAL FAILURE: Status: ACTIVE | Noted: 2024-07-08

## 2024-07-08 LAB
INR PPP: 2.5 (ref 0.8–1.2)
PROTHROMBIN TIME: 25.5 SEC (ref 9–12.5)

## 2024-07-08 PROCEDURE — 93793 ANTICOAG MGMT PT WARFARIN: CPT | Mod: S$GLB,,,

## 2024-07-08 PROCEDURE — 36415 COLL VENOUS BLD VENIPUNCTURE: CPT | Mod: HCNC | Performed by: INTERNAL MEDICINE

## 2024-07-08 PROCEDURE — 85610 PROTHROMBIN TIME: CPT | Mod: HCNC | Performed by: INTERNAL MEDICINE

## 2024-07-08 NOTE — PROGRESS NOTES
[facility]    2024 11:53 AM    Assessment/Plan:    Patient presents today with therapeutic INR.    Assessment of patient findings and chart review: Reviewed     Recommendation for patient's warfarin regimen: Continue current maintenance dose    Recommend repeat INR in 1 week  _________________________________________________________________    Dariel Devon Urbina (83 y.o.) is followed by the [facility].    Anticoagulation Summary  As of 2024      INR goal:  2.0-3.0   TTR:  67.8% (11.9 y)   INR used for dosin.5 (2024)   Warfarin maintenance plan:  5 mg (5 mg x 1) every day   Weekly warfarin total:  35 mg   Plan last modified:  Laurie Patino, PharmD (2024)   Next INR check:  2024   Target end date:      Indications    Chronic anticoagulation [Z79.01]  H/O mechanical aortic valve replacement [Z95.2]                 Anticoagulation Episode Summary       INR check location:  Clinic Lab    Preferred lab:      Send INR reminders to:  [facility] COUMADIN MONITORING POOL    Comments:  [facility] - Internal Med Clinic only Mon - Thu // carpel tunnel release  - target low end of range          Anticoagulation Care Providers       Provider Role Specialty Phone number    Devon Garnica MD [facility] Cardiology [phone]

## 2024-07-15 ENCOUNTER — TELEPHONE (OUTPATIENT)
Dept: CARDIOLOGY | Facility: CLINIC | Age: 84
End: 2024-07-15
Payer: MEDICARE

## 2024-07-15 DIAGNOSIS — R00.2 PALPITATIONS: Primary | ICD-10-CM

## 2024-07-15 PROBLEM — I25.10 ATHEROSCLEROSIS OF NATIVE CORONARY ARTERY OF NATIVE HEART WITHOUT ANGINA PECTORIS: Status: RESOLVED | Noted: 2021-09-29 | Resolved: 2024-07-15

## 2024-07-15 PROBLEM — I70.0 AORTIC CALCIFICATION: Status: RESOLVED | Noted: 2023-07-26 | Resolved: 2024-07-15

## 2024-07-15 PROBLEM — I77.9 BILATERAL CAROTID ARTERY DISEASE: Status: RESOLVED | Noted: 2017-02-09 | Resolved: 2024-07-15

## 2024-07-15 PROBLEM — I10 BENIGN ESSENTIAL HTN: Status: RESOLVED | Noted: 2024-05-30 | Resolved: 2024-07-15

## 2024-07-15 RX ORDER — METOPROLOL SUCCINATE 25 MG/1
25 TABLET, EXTENDED RELEASE ORAL
Qty: 90 TABLET | Refills: 3 | Status: SHIPPED | OUTPATIENT
Start: 2024-07-15

## 2024-07-15 NOTE — PROGRESS NOTES
"Subjective:    Patient ID:  Dariel Urbina is a 83 y.o. male who presents for evaluation of chest heaviness    HPI  The patient  is an 83 year old male  with CAD s/p CABG (1990s), mechanical AVR (on warfarin), moderate-severe MR, CKD stage 4 . He presented to Interventional Clinic 4/25/23 for evaluation for CORNELL Mitral valve. He underwent coronary angiogram procedure due to abnormal stress test and also as part of pre mitral clip evaluation but no intervention was done. A KIRSTEN 4/14/23 revealed only mild mitral regurgitation and Griselda Clip was deferred . He was admitted 6/19/24 for epistaxis and continues to has posterior epistaxis "spit up clots" . He is in the process of dental extraction  for dental plates. He has poor appetite and has lost 20# since his last visit in November. He has increased SOB and LÓPEZ but no edema or orthopnea.      Summary 3/2/23         The Ost LAD lesion was 50% stenosed.    The Ost Cx to Mid Cx lesion was 100% stenosed.    The Mid LAD lesion was 100% stenosed.    The estimated blood loss was none.    Summary 5/30/24      Left Ventricle: The left ventricle is normal in size. Normal wall thickness. There is concentric hypertrophy. Septal motion is consistent with post-operative status. There is low normal systolic function with a visually estimated ejection fraction of 50 - 55%. Biplane (2D) method of discs ejection fraction is 51%.    Right Ventricle: Mild right ventricular enlargement. Wall thickness is normal. Systolic function is normal.    Left Atrium: Left atrium is severely dilated.    Right Atrium: Right atrium is severely dilated.    Aortic Valve: There is a mechanical valve in the aortic position. Aortic valve area by VTI is 0.87 cm². Aortic valve peak velocity is 3.02 m/s. Mean gradient is 19 mmHg. The dimensionless index is 0.31. There is trace aortic regurgitation.    Mitral Valve: There is mild bileaflet sclerosis. There is moderate mitral annular calcification present. " There is moderate to severe regurgitation with a centrally directed jet.    Tricuspid Valve: There is moderate regurgitation.    Pulmonary Artery: There is moderate pulmonary hypertension. The estimated pulmonary artery systolic pressure is 57 mmHg.    IVC/SVC: Intermediate venous pressure at 8 mmHg.     Lab Results   Component Value Date     07/08/2024    K 4.6 07/08/2024     07/08/2024    CO2 21 (L) 07/08/2024    BUN 42 (H) 07/08/2024    CREATININE 2.1 (H) 07/08/2024    GLU 86 07/08/2024    HGBA1C 5.3 05/30/2024    MG 1.8 06/15/2024    AST 16 06/12/2024    ALT 15 06/12/2024    ALBUMIN 3.0 (L) 07/08/2024    PROT 6.6 06/12/2024    BILITOT 0.4 06/12/2024    WBC 5.05 07/08/2024    HGB 8.4 (L) 07/08/2024    HCT 27.6 (L) 07/08/2024    HCT 25 (L) 05/31/2024    MCV 97 07/08/2024     07/08/2024    INR 2.5 (H) 07/08/2024    INR 3.7 01/31/2022    INR 2.0 (H) 03/15/2010    PSA 0.35 07/27/2012    TSH 3.705 09/29/2022         Lab Results   Component Value Date    CHOL 96 (L) 05/29/2024    HDL 31 (L) 05/29/2024    TRIG 144 05/29/2024       Lab Results   Component Value Date    LDLCALC 36.2 (L) 05/29/2024       Past Medical History:   Diagnosis Date    Anemia of chronic renal failure, stage 3 (moderate) 05/27/2015    Anticoagulant long-term use     Atherosclerosis of coronary artery bypass graft of native heart without angina pectoris 09/11/2012    3-27-18 [facility] Two vessel coronary artery disease.   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    Bilateral carotid artery disease 02/09/2017    Bleeding from the nose     Bleeding nose 03/21/2018    Cancer     Cataract     CHF (congestive heart failure)     CKD (chronic kidney disease) stage 3, GFR 30-59 ml/min 05/27/2015    Claudication of left lower extremity 09/17/2014    Colon polyp     Encounter for blood transfusion     Gastroesophageal reflux disease without esophagitis 03/19/2018    Gastrointestinal hemorrhage associated with intestinal  diverticulosis 04/01/2018    Glaucoma     H/O mechanical aortic valve replacement 09/17/2014    History of gout 09/26/2012    Hyperparathyroidism due to renal insufficiency 07/27/2015    Internal hemorrhoid 04/03/2018    Long term current use of anticoagulant therapy 09/26/2012    Mechanical heart valve present     Metabolic acidosis with normal anion gap and bicarbonate losses 03/20/2018    Mixed hyperlipidemia 09/26/2012    NSTEMI (non-ST elevated myocardial infarction) 03/21/2018    Obesity, diabetes, and hypertension syndrome 02/23/2016    Paroxysmal atrial fibrillation 02/06/2023    PVD (peripheral vascular disease) 09/11/2012    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       Current Outpatient Medications:     acetaminophen (TYLENOL) 500 MG tablet, Take 500 mg by mouth daily as needed for Pain., Disp: , Rfl:     allopurinoL (ZYLOPRIM) 100 MG tablet, Take 1 tablet (100 mg total) by mouth once daily., Disp: 90 tablet, Rfl: 3    bumetanide (BUMEX) 1 MG tablet, Take 2 tablets (2 mg total) by mouth every other day., Disp: , Rfl:     ferrous gluconate (FERGON) 324 MG tablet, TAKE 1 TABLET EVERY DAY WITH BREAKFAST, Disp: 90 tablet, Rfl: 3    isosorbide mononitrate (IMDUR) 60 MG 24 hr tablet, Take 1 tablet (60 mg total) by mouth every evening., Disp: 90 tablet, Rfl: 3    metoprolol succinate (TOPROL-XL) 25 MG 24 hr tablet, TAKE 1 TABLET ONE TIME DAILY, Disp: 90 tablet, Rfl: 3    omega-3 fatty acids/fish oil (FISH OIL-OMEGA-3 FATTY ACIDS) 300-1,000 mg capsule, Take 1 capsule by mouth once daily., Disp: , Rfl:     rosuvastatin (CRESTOR) 40 MG Tab, TAKE 1 TABLET EVERY EVENING., Disp: 90 tablet, Rfl:  3    sodium chloride (OCEAN) 0.65 % nasal spray, 1 spray by Nasal route as needed for Congestion., Disp: 44 mL, Rfl: 12    sodium chloride (SALINE NASAL) 0.65 % nasal spray, 2 sprays by Nasal route 3 (three) times daily., Disp: 44 mL, Rfl: 3    traZODone (DESYREL) 50 MG tablet, Take 1 tablet (50 mg total) by mouth every evening., Disp: 90 tablet, Rfl: 2    warfarin (COUMADIN) 5 MG tablet, TAKE 1/2 TABLET (2.5MG) SATURDAY, THEN TAKE 1 TABLET (5MG) ALL OTHER DAYS OR AS INSTRUCTED BY COUMADIN CLINIC (Patient taking differently: Take 5 mg by mouth every evening.), Disp: 90 tablet, Rfl: 3  No current facility-administered medications for this visit.    Facility-Administered Medications Ordered in Other Visits:     ceFAZolin 2 g in dextrose 5 % in water (D5W) 50 mL IVPB (MB+), 2 g, Intravenous, On Call Procedure, Yenifer Sandoval MD    HYDROmorphone injection 0.2 mg, 0.2 mg, Intravenous, Q5 Min PRN, Saeed Farrell MD    sodium chloride 0.9% flush 10 mL, 10 mL, Intravenous, PRN, Saeed Farrell MD          Review of Systems   Constitutional: Positive for decreased appetite and weight loss. Negative for diaphoresis, fever and malaise/fatigue.   HENT:  Positive for nosebleeds. Negative for congestion, ear discharge and ear pain.    Eyes:  Negative for blurred vision, double vision and visual disturbance.   Cardiovascular:  Positive for dyspnea on exertion. Negative for chest pain, claudication, cyanosis, irregular heartbeat, leg swelling, near-syncope, orthopnea, palpitations, paroxysmal nocturnal dyspnea and syncope.   Respiratory:  Positive for shortness of breath. Negative for cough, hemoptysis, sleep disturbances due to breathing, snoring, sputum production and wheezing.    Endocrine: Negative for polydipsia, polyphagia and polyuria.   Hematologic/Lymphatic: Negative for adenopathy and bleeding problem. Does not bruise/bleed easily.   Skin:  Negative for color change, nail changes, poor wound healing and rash.  "  Musculoskeletal:  Negative for muscle cramps and muscle weakness.   Gastrointestinal:  Positive for melena (intermittent). Negative for abdominal pain, anorexia, change in bowel habit, hematochezia, nausea and vomiting.   Genitourinary:  Negative for dysuria, frequency and hematuria.   Neurological:  Negative for brief paralysis, difficulty with concentration, excessive daytime sleepiness, dizziness, focal weakness, headaches, light-headedness, seizures, vertigo and weakness.   Psychiatric/Behavioral:  Negative for altered mental status and depression.    Allergic/Immunologic: Negative for persistent infections.        Objective:/72   Pulse 60   Ht 5' 5" (1.651 m)   Wt 69.3 kg (152 lb 12.5 oz)   SpO2 (!) 84%   BMI 25.42 kg/m²             Physical Exam  Constitutional:       Appearance: He is well-developed. He is ill-appearing.   HENT:      Head: Normocephalic.      Right Ear: External ear normal.      Left Ear: External ear normal.      Nose: Nose normal.   Eyes:      General: No scleral icterus.     Pupils: Pupils are equal, round, and reactive to light.   Neck:      Thyroid: No thyromegaly.      Vascular: No JVD.      Trachea: No tracheal deviation.   Cardiovascular:      Rate and Rhythm: Normal rate and regular rhythm.      Pulses: Intact distal pulses.           Carotid pulses are 2+ on the right side with bruit and 2+ on the left side.       Femoral pulses are 2+ on the right side and 2+ on the left side.       Dorsalis pedis pulses are 0 on the right side and 0 on the left side.        Posterior tibial pulses are 0 on the right side and 0 on the left side.      Heart sounds: S1 normal and S2 normal. No murmur heard.     No friction rub. No gallop.      Comments: No edema  JVP normal  Pulmonary:      Effort: Pulmonary effort is normal.      Breath sounds: Normal breath sounds.   Abdominal:      General: Bowel sounds are normal. There is no distension.      Tenderness: There is no abdominal " tenderness. There is no guarding.   Musculoskeletal:         General: No tenderness. Normal range of motion.      Cervical back: Normal range of motion and neck supple.   Lymphadenopathy:      Comments: Palpation of neck and groin lymph nodes normal   Skin:     General: Skin is dry.      Comments: Palpation of skin normal   Neurological:      Mental Status: He is alert and oriented to person, place, and time.      Cranial Nerves: No cranial nerve deficit.      Motor: No abnormal muscle tone.      Coordination: Coordination normal.   Psychiatric:         Behavior: Behavior normal.         Thought Content: Thought content normal.         Judgment: Judgment normal.           Assessment:       1. Epistaxis    2. Recurrent epistaxis    3. Stage 3b chronic kidney disease    4. Chronic kidney disease, stage 4 (severe)    5. Hypertension associated with diabetes    6. H/O mechanical aortic valve replacement    7. Hyperlipidemia associated with type 2 diabetes mellitus    8. Coronary artery disease of bypass graft of native heart with stable angina pectoris    9. NSTEMI (non-ST elevated myocardial infarction)    10. Paroxysmal atrial fibrillation    11. Chronic anticoagulation    12. Acute blood loss anemia    13. Type 2 diabetes mellitus with diabetic peripheral angiopathy without gangrene, without long-term current use of insulin    14. History of colon resection    15. Carotid bruit, unspecified laterality         Plan:       Dariel was seen today for congestive heart failure.    Diagnoses and all orders for this visit:    Epistaxis  -     CBC Auto Differential; Future    Recurrent epistaxis    Stage 3b chronic kidney disease  -     Comprehensive Metabolic Panel; Future    Chronic kidney disease, stage 4 (severe)    Hypertension associated with diabetes    H/O mechanical aortic valve replacement    Hyperlipidemia associated with type 2 diabetes mellitus    Coronary artery disease of bypass graft of native heart with stable  angina pectoris    NSTEMI (non-ST elevated myocardial infarction)    Paroxysmal atrial fibrillation    Chronic anticoagulation    Acute blood loss anemia    Type 2 diabetes mellitus with diabetic peripheral angiopathy without gangrene, without long-term current use of insulin    History of colon resection    Carotid bruit, unspecified laterality  -     CV Ultrasound Bilateral Doppler Carotid; Future

## 2024-07-15 NOTE — TELEPHONE ENCOUNTER
No care due was identified.  [facility] Embedded Care Due Messages. Reference number: [id number].   7/15/2024 10:59:49 AM CDT

## 2024-07-16 ENCOUNTER — PATIENT MESSAGE (OUTPATIENT)
Dept: CARDIOLOGY | Facility: CLINIC | Age: 84
End: 2024-07-16

## 2024-07-16 ENCOUNTER — ANTI-COAG VISIT (OUTPATIENT)
Dept: CARDIOLOGY | Facility: CLINIC | Age: 84
End: 2024-07-16
Payer: MEDICARE

## 2024-07-16 ENCOUNTER — OFFICE VISIT (OUTPATIENT)
Dept: CARDIOLOGY | Facility: CLINIC | Age: 84
End: 2024-07-16
Payer: MEDICARE

## 2024-07-16 ENCOUNTER — LAB VISIT (OUTPATIENT)
Dept: LAB | Facility: HOSPITAL | Age: 84
End: 2024-07-16
Payer: MEDICARE

## 2024-07-16 VITALS
WEIGHT: 152.75 LBS | OXYGEN SATURATION: 84 % | BODY MASS INDEX: 25.45 KG/M2 | DIASTOLIC BLOOD PRESSURE: 72 MMHG | HEIGHT: 65 IN | HEART RATE: 60 BPM | SYSTOLIC BLOOD PRESSURE: 110 MMHG

## 2024-07-16 DIAGNOSIS — N18.4 CHRONIC KIDNEY DISEASE, STAGE 4 (SEVERE): ICD-10-CM

## 2024-07-16 DIAGNOSIS — E11.59 HYPERTENSION ASSOCIATED WITH DIABETES: ICD-10-CM

## 2024-07-16 DIAGNOSIS — E11.69 HYPERLIPIDEMIA ASSOCIATED WITH TYPE 2 DIABETES MELLITUS: ICD-10-CM

## 2024-07-16 DIAGNOSIS — D62 ACUTE BLOOD LOSS ANEMIA: ICD-10-CM

## 2024-07-16 DIAGNOSIS — Z79.01 CHRONIC ANTICOAGULATION: Primary | ICD-10-CM

## 2024-07-16 DIAGNOSIS — I21.4 NSTEMI (NON-ST ELEVATED MYOCARDIAL INFARCTION): ICD-10-CM

## 2024-07-16 DIAGNOSIS — I48.0 PAROXYSMAL ATRIAL FIBRILLATION: ICD-10-CM

## 2024-07-16 DIAGNOSIS — Z79.01 LONG TERM (CURRENT) USE OF ANTICOAGULANTS: ICD-10-CM

## 2024-07-16 DIAGNOSIS — Z95.2 H/O MECHANICAL AORTIC VALVE REPLACEMENT: ICD-10-CM

## 2024-07-16 DIAGNOSIS — I15.2 HYPERTENSION ASSOCIATED WITH DIABETES: ICD-10-CM

## 2024-07-16 DIAGNOSIS — E11.51 TYPE 2 DIABETES MELLITUS WITH DIABETIC PERIPHERAL ANGIOPATHY WITHOUT GANGRENE, WITHOUT LONG-TERM CURRENT USE OF INSULIN: ICD-10-CM

## 2024-07-16 DIAGNOSIS — Z79.01 CHRONIC ANTICOAGULATION: ICD-10-CM

## 2024-07-16 DIAGNOSIS — Z90.49 HISTORY OF COLON RESECTION: ICD-10-CM

## 2024-07-16 DIAGNOSIS — I25.708 CORONARY ARTERY DISEASE OF BYPASS GRAFT OF NATIVE HEART WITH STABLE ANGINA PECTORIS: ICD-10-CM

## 2024-07-16 DIAGNOSIS — R04.0 RECURRENT EPISTAXIS: ICD-10-CM

## 2024-07-16 DIAGNOSIS — R09.89 CAROTID BRUIT, UNSPECIFIED LATERALITY: ICD-10-CM

## 2024-07-16 DIAGNOSIS — N18.32 STAGE 3B CHRONIC KIDNEY DISEASE: Chronic | ICD-10-CM

## 2024-07-16 DIAGNOSIS — R04.0 EPISTAXIS: Primary | ICD-10-CM

## 2024-07-16 DIAGNOSIS — E78.5 HYPERLIPIDEMIA ASSOCIATED WITH TYPE 2 DIABETES MELLITUS: ICD-10-CM

## 2024-07-16 LAB
INR PPP: 6.4 (ref 0.8–1.2)
PROTHROMBIN TIME: 61.9 SEC (ref 9–12.5)

## 2024-07-16 PROCEDURE — 99999 PR PBB SHADOW E&M-EST. PATIENT-LVL IV: CPT | Mod: PBBFAC,HCNC,, | Performed by: INTERNAL MEDICINE

## 2024-07-16 PROCEDURE — 36415 COLL VENOUS BLD VENIPUNCTURE: CPT | Performed by: INTERNAL MEDICINE

## 2024-07-16 PROCEDURE — 85610 PROTHROMBIN TIME: CPT | Performed by: INTERNAL MEDICINE

## 2024-07-16 NOTE — PROGRESS NOTES
[facility]    2024 12:58 PM    Assessment/Plan:    Patient presents today with supratherapeutic INR.    Assessment of patient findings and chart review: Likely due to decrease in appetite & weight loss.     Recommendation for patient's warfarin regimen: Pt hold warfarin until INR re-assessed.     Recommend repeat INR Thursday.   _________________________________________________________________    Dariel Urbina (83 y.o.) is followed by the [facility].    Anticoagulation Summary  As of 2024      INR goal:  2.0-3.0   TTR:  67.7% (11.9 y)   INR used for dosin.4 (2024)   Warfarin maintenance plan:  5 mg (5 mg x 1) every day   Weekly warfarin total:  35 mg   Plan last modified:  Laurie Patino, PharmD (2024)   Next INR check:  2024   Target end date:      Indications    Chronic anticoagulation [Z79.01]  H/O mechanical aortic valve replacement [Z95.2]                 Anticoagulation Episode Summary       INR check location:  Clinic Lab    Preferred lab:      Send INR reminders to:  [facility] COUMADIN MONITORING POOL    Comments:  [facility] - Internal Med Clinic only Mon - Thu // carpel tunnel release  - target low end of range          Anticoagulation Care Providers       Provider Role Specialty Phone number    Devon Garnica MD [facility] Cardiology [phone]

## 2024-07-16 NOTE — PROGRESS NOTES
Marvin with [facility] Lab called in a verbal result as: INR 6.4 dated today 7/16/24

## 2024-07-17 ENCOUNTER — OUTPATIENT CASE MANAGEMENT (OUTPATIENT)
Dept: ADMINISTRATIVE | Facility: OTHER | Age: 84
End: 2024-07-17
Payer: MEDICARE

## 2024-07-17 NOTE — PROGRESS NOTES
Outpatient Care Management  Plan of Care Follow Up Visit    Patient: Dariel Urbina  MRN: [id number]  Date of Service: 07/17/2024  Completed by: Tamiko Lyons RN  Referral Date: 05/30/2024    Reason for Visit   Patient presents with    OPCM RN Follow Up Call       Brief Summary: OPCM RN followed up with Mrs. Lindquist today for care plan review.    Next Steps:   Explore follow up appt with Dr. Russell for plan of care.  Review recent lab values/INR.   Review if Mr. Vieira has had any epistaxis episodes.   Mrs. Lindquist agreed to OPCM RN follow up on or around 7/31/24.

## 2024-07-18 ENCOUNTER — LAB VISIT (OUTPATIENT)
Dept: LAB | Facility: HOSPITAL | Age: 84
End: 2024-07-18
Attending: INTERNAL MEDICINE
Payer: MEDICARE

## 2024-07-18 ENCOUNTER — ANTI-COAG VISIT (OUTPATIENT)
Dept: CARDIOLOGY | Facility: CLINIC | Age: 84
End: 2024-07-18
Payer: MEDICARE

## 2024-07-18 ENCOUNTER — TELEPHONE (OUTPATIENT)
Dept: INTERNAL MEDICINE | Facility: CLINIC | Age: 84
End: 2024-07-18
Payer: MEDICARE

## 2024-07-18 DIAGNOSIS — Z79.01 CHRONIC ANTICOAGULATION: Primary | ICD-10-CM

## 2024-07-18 DIAGNOSIS — Z79.01 LONG TERM (CURRENT) USE OF ANTICOAGULANTS: ICD-10-CM

## 2024-07-18 DIAGNOSIS — Z95.2 H/O MECHANICAL AORTIC VALVE REPLACEMENT: ICD-10-CM

## 2024-07-18 LAB
INR PPP: 2.3 (ref 0.8–1.2)
PROTHROMBIN TIME: 23.9 SEC (ref 9–12.5)

## 2024-07-18 PROCEDURE — 93793 ANTICOAG MGMT PT WARFARIN: CPT | Mod: S$GLB,,,

## 2024-07-18 PROCEDURE — 85610 PROTHROMBIN TIME: CPT | Performed by: INTERNAL MEDICINE

## 2024-07-18 PROCEDURE — 36415 COLL VENOUS BLD VENIPUNCTURE: CPT | Performed by: INTERNAL MEDICINE

## 2024-07-18 NOTE — TELEPHONE ENCOUNTER
wife is here at check out would like   medication  Bumetanide to be change from one tablet every other day to two tablets every other day per hospital request .    Lory GALLAGHER

## 2024-07-19 RX ORDER — BUMETANIDE 1 MG/1
2 TABLET ORAL EVERY OTHER DAY
Start: 2024-07-19 | End: 2025-07-19

## 2024-07-19 NOTE — TELEPHONE ENCOUNTER
----- Message from Tiny Smith sent at 7/19/2024  2:06 PM CDT -----  Contact: Pt mobile   [phone] Pts wife Mrs. Urbina  Requesting an RX refill or new RX.    Is this a refill or new RX: Refill    RX name and strength bumetanide (BUMEX) 1 MG tablet    Is this a 30 day or 90 day RX: 90    Pharmacy name and phone #   [facility] - [facility] [address]  [address] [address]  Phone: [phone] Fax: [phone]      The doctors have asked that we provide their patients with the following 2 reminders -- prescription refills can take up to 72 hours, and a friendly reminder that in the future you can use your [facility] account to request refills:   Comment: Patients wife Mrs. Urbina said that her  was admitted [facility] and she said that the patient was told to take two bumetanide (BUMEX) 1 MG tablet every other day instead of one pill and now the patient is out of medication.

## 2024-07-19 NOTE — TELEPHONE ENCOUNTER
No care due was identified.  [facility] Embedded Care Due Messages. Reference number: [id number].   7/19/2024 2:17:11 PM CDT

## 2024-07-23 ENCOUNTER — HOSPITAL ENCOUNTER (OUTPATIENT)
Dept: CARDIOLOGY | Facility: HOSPITAL | Age: 84
Discharge: HOME | End: 2024-07-23
Attending: INTERNAL MEDICINE
Payer: MEDICARE

## 2024-07-23 ENCOUNTER — ANTI-COAG VISIT (OUTPATIENT)
Dept: CARDIOLOGY | Facility: CLINIC | Age: 84
End: 2024-07-23
Payer: MEDICARE

## 2024-07-23 DIAGNOSIS — R09.89 CAROTID BRUIT, UNSPECIFIED LATERALITY: ICD-10-CM

## 2024-07-23 DIAGNOSIS — Z95.2 H/O MECHANICAL AORTIC VALVE REPLACEMENT: ICD-10-CM

## 2024-07-23 DIAGNOSIS — Z79.01 CHRONIC ANTICOAGULATION: Primary | ICD-10-CM

## 2024-07-23 LAB
LEFT ARM DIASTOLIC BLOOD PRESSURE: 41 MMHG
LEFT ARM SYSTOLIC BLOOD PRESSURE: 122 MMHG
LEFT CBA DIAS: 52 CM/S
LEFT CBA SYS: 253 CM/S
LEFT CCA DIST DIAS: 21 CM/S
LEFT CCA DIST SYS: 203 CM/S
LEFT CCA MID DIAS: 16 CM/S
LEFT CCA MID SYS: 135 CM/S
LEFT CCA PROX DIAS: 9 CM/S
LEFT CCA PROX SYS: 83 CM/S
LEFT ECA DIAS: 0 CM/S
LEFT ECA SYS: 133 CM/S
LEFT ICA DIST DIAS: 23 CM/S
LEFT ICA DIST SYS: 124 CM/S
LEFT ICA MID DIAS: 41 CM/S
LEFT ICA MID SYS: 210 CM/S
LEFT ICA PROX DIAS: 39 CM/S
LEFT ICA PROX SYS: 218 CM/S
LEFT VERTEBRAL DIAS: 10 CM/S
LEFT VERTEBRAL SYS: 63 CM/S
OHS CV CAROTID RIGHT ICA EDV HIGHEST: 31
OHS CV CAROTID ULTRASOUND LEFT ICA/CCA RATIO: 1.07
OHS CV CAROTID ULTRASOUND RIGHT ICA/CCA RATIO: 2.51
OHS CV PV CAROTID LEFT HIGHEST CCA: 203
OHS CV PV CAROTID LEFT HIGHEST ICA: 218
OHS CV PV CAROTID RIGHT HIGHEST CCA: 115
OHS CV PV CAROTID RIGHT HIGHEST ICA: 233
OHS CV US CAROTID LEFT HIGHEST EDV: 41
RIGHT CBA DIAS: 14 CM/S
RIGHT CBA SYS: 92 CM/S
RIGHT CCA DIST DIAS: 9 CM/S
RIGHT CCA DIST SYS: 93 CM/S
RIGHT CCA MID DIAS: 8 CM/S
RIGHT CCA MID SYS: 113 CM/S
RIGHT CCA PROX DIAS: 10 CM/S
RIGHT CCA PROX SYS: 115 CM/S
RIGHT ECA DIAS: 0 CM/S
RIGHT ECA SYS: 224 CM/S
RIGHT ICA DIST DIAS: 23 CM/S
RIGHT ICA DIST SYS: 124 CM/S
RIGHT ICA MID DIAS: 26 CM/S
RIGHT ICA MID SYS: 115 CM/S
RIGHT ICA PROX DIAS: 31 CM/S
RIGHT ICA PROX SYS: 233 CM/S
RIGHT VERTEBRAL DIAS: 17 CM/S
RIGHT VERTEBRAL SYS: 133 CM/S

## 2024-07-23 PROCEDURE — 93880 EXTRACRANIAL BILAT STUDY: CPT | Mod: 26,,, | Performed by: INTERNAL MEDICINE

## 2024-07-23 PROCEDURE — 93880 EXTRACRANIAL BILAT STUDY: CPT

## 2024-07-23 PROCEDURE — 93793 ANTICOAG MGMT PT WARFARIN: CPT | Mod: S$GLB,,,

## 2024-07-23 NOTE — PROGRESS NOTES
[facility]    2024 10:13 AM    Assessment/Plan:    Patient presents today with therapeutic INR.    Assessment of patient findings and chart review: Reviewed.  Continue decreased maintenance dose.      Recommendation for patient's warfarin regimen: Continue current maintenance dose    Recommend repeat INR in 2 weeks  _________________________________________________________________    Dariel Urbina (83 y.o.) is followed by the [facility].    Anticoagulation Summary  As of 2024      INR goal:  2.0-3.0   TTR:  67.7% (11.9 y)   INR used for dosin.0 (2024)   Warfarin maintenance plan:  2.5 mg (5 mg x 0.5) every Mon, Fri; 5 mg (5 mg x 1) all other days   Weekly warfarin total:  30 mg   Plan last modified:  Laurie Patino, PharmD (2024)   Next INR check:  2024   Target end date:      Indications    Chronic anticoagulation [Z79.01]  H/O mechanical aortic valve replacement [Z95.2]                 Anticoagulation Episode Summary       INR check location:  Clinic Lab    Preferred lab:      Send INR reminders to:  [facility] COUMADIN MONITORING POOL    Comments:  [facility] - Internal Med Clinic only Mon - Th // carpel tunnel release  - target low end of range          Anticoagulation Care Providers       Provider Role Specialty Phone number    Devon Garnica MD [facility] Cardiology [phone]

## 2024-07-24 ENCOUNTER — PATIENT MESSAGE (OUTPATIENT)
Dept: INTERNAL MEDICINE | Facility: CLINIC | Age: 84
End: 2024-07-24
Payer: MEDICARE

## 2024-07-24 RX ORDER — BUMETANIDE 2 MG/1
TABLET ORAL
Qty: 180 TABLET | Refills: 2 | OUTPATIENT
Start: 2024-07-24

## 2024-07-24 RX ORDER — BUMETANIDE 1 MG/1
2 TABLET ORAL EVERY OTHER DAY
Qty: 90 TABLET | Refills: 3 | Status: SHIPPED | OUTPATIENT
Start: 2024-07-24

## 2024-07-24 NOTE — TELEPHONE ENCOUNTER
No care due was identified.  [facility] Embedded Care Due Messages. Reference number: [id number].   7/24/2024 11:06:37 AM CDT

## 2024-07-31 ENCOUNTER — OUTPATIENT CASE MANAGEMENT (OUTPATIENT)
Dept: ADMINISTRATIVE | Facility: OTHER | Age: 84
End: 2024-07-31
Payer: MEDICARE

## 2024-07-31 NOTE — PROGRESS NOTES
Outpatient Care Management  Plan of Care Follow Up Visit    Patient: Dariel Urbina  MRN: [id number]  Date of Service: 07/31/2024  Completed by: Tamiko Lyons RN  Referral Date: 05/30/2024    Reason for Visit   Patient presents with    OPCM RN Follow Up Call    Nursing Assessment       Brief Summary: OPCM RN followed up with Mrs. Lindquist today for care plan review.    Next Steps:   Review appt with Dr. Russell for plan of care.   Review appt with Dr. Langston for plan of care.  Explore recent lab values.   Explore if Mr. Vieira has had a decrease in epistaxis episodes.  Review if Mr. Vieira has been free from falls?  Encourage physical activity, such as performance of self-care at highest level of ability, strength and balance exercise program, and provision of appropriate assistive devices; refer to rehabilitation therapy.   Mrs. Lindquist agreed to OPCM RN follow up on or around 8/15/24.

## 2024-08-05 ENCOUNTER — ANESTHESIA EVENT (OUTPATIENT)
Dept: SURGERY | Facility: HOSPITAL | Age: 84
DRG: 144 | End: 2024-08-05
Payer: MEDICARE

## 2024-08-05 ENCOUNTER — HOSPITAL ENCOUNTER (INPATIENT)
Facility: HOSPITAL | Age: 84
LOS: 4 days | Discharge: HOME | DRG: 144 | End: 2024-08-09
Attending: EMERGENCY MEDICINE | Admitting: STUDENT IN AN ORGANIZED HEALTH CARE EDUCATION/TRAINING PROGRAM
Payer: MEDICARE

## 2024-08-05 DIAGNOSIS — Z79.01 CHRONIC ANTICOAGULATION: ICD-10-CM

## 2024-08-05 DIAGNOSIS — I48.0 PAROXYSMAL ATRIAL FIBRILLATION: ICD-10-CM

## 2024-08-05 DIAGNOSIS — R04.0 RECURRENT EPISTAXIS: ICD-10-CM

## 2024-08-05 DIAGNOSIS — E11.22 TYPE 2 DIABETES MELLITUS WITH STAGE 4 CHRONIC KIDNEY DISEASE, WITHOUT LONG-TERM CURRENT USE OF INSULIN: Chronic | ICD-10-CM

## 2024-08-05 DIAGNOSIS — R04.0 EPISTAXIS: Primary | ICD-10-CM

## 2024-08-05 DIAGNOSIS — N18.4 TYPE 2 DIABETES MELLITUS WITH STAGE 4 CHRONIC KIDNEY DISEASE, WITHOUT LONG-TERM CURRENT USE OF INSULIN: Chronic | ICD-10-CM

## 2024-08-05 DIAGNOSIS — R58 BLEEDING: ICD-10-CM

## 2024-08-05 DIAGNOSIS — R07.9 CHEST PAIN: ICD-10-CM

## 2024-08-05 DIAGNOSIS — I25.10 CORONARY ARTERY DISEASE INVOLVING NATIVE CORONARY ARTERY OF NATIVE HEART WITHOUT ANGINA PECTORIS: ICD-10-CM

## 2024-08-05 DIAGNOSIS — D62 ACUTE BLOOD LOSS ANEMIA: ICD-10-CM

## 2024-08-05 LAB
ABO + RH BLD: NORMAL
ALBUMIN SERPL BCP-MCNC: 2.9 G/DL (ref 3.5–5.2)
ALP SERPL-CCNC: 78 U/L (ref 55–135)
ALT SERPL W/O P-5'-P-CCNC: 14 U/L (ref 10–44)
ANION GAP SERPL CALC-SCNC: 9 MMOL/L (ref 8–16)
APTT PPP: 35.2 SEC (ref 21–32)
AST SERPL-CCNC: 16 U/L (ref 10–40)
BASOPHILS # BLD AUTO: 0.02 K/UL (ref 0–0.2)
BASOPHILS NFR BLD: 0.3 % (ref 0–1.9)
BILIRUB SERPL-MCNC: 0.4 MG/DL (ref 0.1–1)
BLD GP AB SCN CELLS X3 SERPL QL: NORMAL
BLD PROD TYP BPU: NORMAL
BLOOD UNIT EXPIRATION DATE: NORMAL
BLOOD UNIT TYPE CODE: 9500
BLOOD UNIT TYPE: NORMAL
BUN SERPL-MCNC: 48 MG/DL (ref 8–23)
CALCIUM SERPL-MCNC: 9.8 MG/DL (ref 8.7–10.5)
CHLORIDE SERPL-SCNC: 109 MMOL/L (ref 95–110)
CO2 SERPL-SCNC: 21 MMOL/L (ref 23–29)
CODING SYSTEM: NORMAL
CREAT SERPL-MCNC: 2.1 MG/DL (ref 0.5–1.4)
CROSSMATCH INTERPRETATION: NORMAL
DIFFERENTIAL METHOD BLD: ABNORMAL
DISPENSE STATUS: NORMAL
EOSINOPHIL # BLD AUTO: 0.1 K/UL (ref 0–0.5)
EOSINOPHIL NFR BLD: 1.1 % (ref 0–8)
ERYTHROCYTE [DISTWIDTH] IN BLOOD BY AUTOMATED COUNT: 17.9 % (ref 11.5–14.5)
EST. GFR  (NO RACE VARIABLE): 30.8 ML/MIN/1.73 M^2
GLUCOSE SERPL-MCNC: 134 MG/DL (ref 70–110)
HCT VFR BLD AUTO: 21.5 % (ref 40–54)
HGB BLD-MCNC: 6.4 G/DL (ref 14–18)
IMM GRANULOCYTES # BLD AUTO: 0.03 K/UL (ref 0–0.04)
IMM GRANULOCYTES NFR BLD AUTO: 0.4 % (ref 0–0.5)
INR PPP: 2.8 (ref 0.8–1.2)
LYMPHOCYTES # BLD AUTO: 1.1 K/UL (ref 1–4.8)
LYMPHOCYTES NFR BLD: 15.5 % (ref 18–48)
MCH RBC QN AUTO: 29.8 PG (ref 27–31)
MCHC RBC AUTO-ENTMCNC: 29.8 G/DL (ref 32–36)
MCV RBC AUTO: 100 FL (ref 82–98)
MONOCYTES # BLD AUTO: 0.7 K/UL (ref 0.3–1)
MONOCYTES NFR BLD: 9.6 % (ref 4–15)
NEUTROPHILS # BLD AUTO: 5.3 K/UL (ref 1.8–7.7)
NEUTROPHILS NFR BLD: 73.1 % (ref 38–73)
NRBC BLD-RTO: 0 /100 WBC
OHS QRS DURATION: 154 MS
OHS QTC CALCULATION: 499 MS
PLATELET # BLD AUTO: 159 K/UL (ref 150–450)
PMV BLD AUTO: 10.5 FL (ref 9.2–12.9)
POTASSIUM SERPL-SCNC: 4.3 MMOL/L (ref 3.5–5.1)
PROT SERPL-MCNC: 6.6 G/DL (ref 6–8.4)
PROTHROMBIN TIME: 29 SEC (ref 9–12.5)
RBC # BLD AUTO: 2.15 M/UL (ref 4.6–6.2)
SODIUM SERPL-SCNC: 139 MMOL/L (ref 136–145)
SPECIMEN OUTDATE: NORMAL
TRANS ERYTHROCYTES VOL PATIENT: NORMAL ML
WBC # BLD AUTO: 7.22 K/UL (ref 3.9–12.7)

## 2024-08-05 PROCEDURE — 80053 COMPREHEN METABOLIC PANEL: CPT | Mod: HCNC | Performed by: STUDENT IN AN ORGANIZED HEALTH CARE EDUCATION/TRAINING PROGRAM

## 2024-08-05 PROCEDURE — 86901 BLOOD TYPING SEROLOGIC RH(D): CPT | Mod: HCNC | Performed by: EMERGENCY MEDICINE

## 2024-08-05 PROCEDURE — 85610 PROTHROMBIN TIME: CPT | Mod: HCNC | Performed by: STUDENT IN AN ORGANIZED HEALTH CARE EDUCATION/TRAINING PROGRAM

## 2024-08-05 PROCEDURE — P9021 RED BLOOD CELLS UNIT: HCPCS | Mod: HCNC | Performed by: STUDENT IN AN ORGANIZED HEALTH CARE EDUCATION/TRAINING PROGRAM

## 2024-08-05 PROCEDURE — 2Y41X5Z PACKING OF NASAL REGION USING PACKING MATERIAL: ICD-10-PCS | Performed by: EMERGENCY MEDICINE

## 2024-08-05 PROCEDURE — 93005 ELECTROCARDIOGRAM TRACING: CPT | Mod: HCNC

## 2024-08-05 PROCEDURE — 85730 THROMBOPLASTIN TIME PARTIAL: CPT | Mod: HCNC | Performed by: STUDENT IN AN ORGANIZED HEALTH CARE EDUCATION/TRAINING PROGRAM

## 2024-08-05 PROCEDURE — 93010 ELECTROCARDIOGRAM REPORT: CPT | Mod: HCNC,,, | Performed by: INTERNAL MEDICINE

## 2024-08-05 PROCEDURE — 36430 TRANSFUSION BLD/BLD COMPNT: CPT | Mod: HCNC

## 2024-08-05 PROCEDURE — 25000003 PHARM REV CODE 250: Mod: HCNC | Performed by: PHYSICIAN ASSISTANT

## 2024-08-05 PROCEDURE — 30901 CONTROL OF NOSEBLEED: CPT | Mod: HCNC,LT

## 2024-08-05 PROCEDURE — 30233N1 TRANSFUSION OF NONAUTOLOGOUS RED BLOOD CELLS INTO PERIPHERAL VEIN, PERCUTANEOUS APPROACH: ICD-10-PCS | Performed by: STUDENT IN AN ORGANIZED HEALTH CARE EDUCATION/TRAINING PROGRAM

## 2024-08-05 PROCEDURE — 86920 COMPATIBILITY TEST SPIN: CPT | Mod: HCNC | Performed by: STUDENT IN AN ORGANIZED HEALTH CARE EDUCATION/TRAINING PROGRAM

## 2024-08-05 PROCEDURE — 85025 COMPLETE CBC W/AUTO DIFF WBC: CPT | Mod: HCNC | Performed by: STUDENT IN AN ORGANIZED HEALTH CARE EDUCATION/TRAINING PROGRAM

## 2024-08-05 PROCEDURE — 99233 SBSQ HOSP IP/OBS HIGH 50: CPT | Mod: HCNC,95,, | Performed by: STUDENT IN AN ORGANIZED HEALTH CARE EDUCATION/TRAINING PROGRAM

## 2024-08-05 PROCEDURE — 11000001 HC ACUTE MED/SURG PRIVATE ROOM: Mod: HCNC

## 2024-08-05 PROCEDURE — 86900 BLOOD TYPING SEROLOGIC ABO: CPT | Mod: HCNC | Performed by: EMERGENCY MEDICINE

## 2024-08-05 PROCEDURE — 99291 CRITICAL CARE FIRST HOUR: CPT | Mod: HCNC

## 2024-08-05 RX ORDER — IBUPROFEN 200 MG
16 TABLET ORAL
Status: DISCONTINUED | OUTPATIENT
Start: 2024-08-05 | End: 2024-08-09 | Stop reason: HOSPADM

## 2024-08-05 RX ORDER — SIMETHICONE 80 MG
1 TABLET,CHEWABLE ORAL 4 TIMES DAILY PRN
Status: DISCONTINUED | OUTPATIENT
Start: 2024-08-05 | End: 2024-08-09 | Stop reason: HOSPADM

## 2024-08-05 RX ORDER — BISACODYL 10 MG/1
10 SUPPOSITORY RECTAL DAILY PRN
Status: DISCONTINUED | OUTPATIENT
Start: 2024-08-05 | End: 2024-08-09 | Stop reason: HOSPADM

## 2024-08-05 RX ORDER — GLUCAGON 1 MG
1 KIT INJECTION
Status: DISCONTINUED | OUTPATIENT
Start: 2024-08-05 | End: 2024-08-09 | Stop reason: HOSPADM

## 2024-08-05 RX ORDER — TALC
6 POWDER (GRAM) TOPICAL NIGHTLY PRN
Status: DISCONTINUED | OUTPATIENT
Start: 2024-08-05 | End: 2024-08-09 | Stop reason: HOSPADM

## 2024-08-05 RX ORDER — IPRATROPIUM BROMIDE AND ALBUTEROL SULFATE 2.5; .5 MG/3ML; MG/3ML
3 SOLUTION RESPIRATORY (INHALATION) EVERY 4 HOURS PRN
Status: DISCONTINUED | OUTPATIENT
Start: 2024-08-05 | End: 2024-08-09 | Stop reason: HOSPADM

## 2024-08-05 RX ORDER — BUMETANIDE 1 MG/1
2 TABLET ORAL EVERY OTHER DAY
Status: DISCONTINUED | OUTPATIENT
Start: 2024-08-05 | End: 2024-08-09 | Stop reason: HOSPADM

## 2024-08-05 RX ORDER — SODIUM CHLORIDE 0.9 % (FLUSH) 0.9 %
5 SYRINGE (ML) INJECTION
Status: DISCONTINUED | OUTPATIENT
Start: 2024-08-05 | End: 2024-08-09 | Stop reason: HOSPADM

## 2024-08-05 RX ORDER — FERROUS GLUCONATE 324(37.5)
324 TABLET ORAL
Status: DISCONTINUED | OUTPATIENT
Start: 2024-08-06 | End: 2024-08-09 | Stop reason: HOSPADM

## 2024-08-05 RX ORDER — ACETAMINOPHEN 325 MG/1
650 TABLET ORAL EVERY 4 HOURS PRN
Status: DISCONTINUED | OUTPATIENT
Start: 2024-08-05 | End: 2024-08-09 | Stop reason: HOSPADM

## 2024-08-05 RX ORDER — HYDROCODONE BITARTRATE AND ACETAMINOPHEN 500; 5 MG/1; MG/1
TABLET ORAL
Status: DISCONTINUED | OUTPATIENT
Start: 2024-08-05 | End: 2024-08-09 | Stop reason: HOSPADM

## 2024-08-05 RX ORDER — CEPHALEXIN 500 MG/1
500 CAPSULE ORAL EVERY 8 HOURS
Status: DISCONTINUED | OUTPATIENT
Start: 2024-08-05 | End: 2024-08-09

## 2024-08-05 RX ORDER — ALUMINUM HYDROXIDE, MAGNESIUM HYDROXIDE, AND SIMETHICONE 1200; 120; 1200 MG/30ML; MG/30ML; MG/30ML
30 SUSPENSION ORAL 4 TIMES DAILY PRN
Status: DISCONTINUED | OUTPATIENT
Start: 2024-08-05 | End: 2024-08-09 | Stop reason: HOSPADM

## 2024-08-05 RX ORDER — OXYMETAZOLINE HCL 0.05 %
2 SPRAY, NON-AEROSOL (ML) NASAL ONCE
Status: DISCONTINUED | OUTPATIENT
Start: 2024-08-05 | End: 2024-08-05

## 2024-08-05 RX ORDER — INSULIN ASPART 100 [IU]/ML
0-5 INJECTION, SOLUTION INTRAVENOUS; SUBCUTANEOUS
Status: DISCONTINUED | OUTPATIENT
Start: 2024-08-05 | End: 2024-08-06

## 2024-08-05 RX ORDER — OXYMETAZOLINE HCL 0.05 %
2 SPRAY, NON-AEROSOL (ML) NASAL EVERY 8 HOURS
Status: DISPENSED | OUTPATIENT
Start: 2024-08-05 | End: 2024-08-08

## 2024-08-05 RX ORDER — METOPROLOL SUCCINATE 25 MG/1
25 TABLET, EXTENDED RELEASE ORAL DAILY
Status: DISCONTINUED | OUTPATIENT
Start: 2024-08-05 | End: 2024-08-06

## 2024-08-05 RX ORDER — PROCHLORPERAZINE EDISYLATE 5 MG/ML
5 INJECTION INTRAMUSCULAR; INTRAVENOUS EVERY 6 HOURS PRN
Status: DISCONTINUED | OUTPATIENT
Start: 2024-08-05 | End: 2024-08-09 | Stop reason: HOSPADM

## 2024-08-05 RX ORDER — NALOXONE HCL 0.4 MG/ML
0.02 VIAL (ML) INJECTION
Status: DISCONTINUED | OUTPATIENT
Start: 2024-08-05 | End: 2024-08-09 | Stop reason: HOSPADM

## 2024-08-05 RX ORDER — ALLOPURINOL 100 MG/1
100 TABLET ORAL DAILY
Status: DISCONTINUED | OUTPATIENT
Start: 2024-08-05 | End: 2024-08-09 | Stop reason: HOSPADM

## 2024-08-05 RX ORDER — ISOSORBIDE MONONITRATE 60 MG/1
60 TABLET, EXTENDED RELEASE ORAL NIGHTLY
Status: DISCONTINUED | OUTPATIENT
Start: 2024-08-05 | End: 2024-08-09 | Stop reason: HOSPADM

## 2024-08-05 RX ORDER — ACETAMINOPHEN 500 MG
1000 TABLET ORAL EVERY 8 HOURS PRN
Status: DISCONTINUED | OUTPATIENT
Start: 2024-08-05 | End: 2024-08-09 | Stop reason: HOSPADM

## 2024-08-05 RX ORDER — IBUPROFEN 200 MG
24 TABLET ORAL
Status: DISCONTINUED | OUTPATIENT
Start: 2024-08-05 | End: 2024-08-09 | Stop reason: HOSPADM

## 2024-08-05 RX ORDER — ONDANSETRON 8 MG/1
8 TABLET, ORALLY DISINTEGRATING ORAL EVERY 8 HOURS PRN
Status: DISCONTINUED | OUTPATIENT
Start: 2024-08-05 | End: 2024-08-09 | Stop reason: HOSPADM

## 2024-08-05 RX ADMIN — CEPHALEXIN 500 MG: 500 CAPSULE ORAL at 11:08

## 2024-08-05 RX ADMIN — ISOSORBIDE MONONITRATE 60 MG: 60 TABLET, EXTENDED RELEASE ORAL at 09:08

## 2024-08-05 RX ADMIN — ALLOPURINOL 100 MG: 100 TABLET ORAL at 11:08

## 2024-08-05 RX ADMIN — OXYMETAZOLINE HCL 2 SPRAY: 0.05 SPRAY NASAL at 02:08

## 2024-08-05 RX ADMIN — METOPROLOL SUCCINATE 25 MG: 25 TABLET, EXTENDED RELEASE ORAL at 11:08

## 2024-08-05 RX ADMIN — NASAL 2 SPRAY: 6.5 SPRAY NASAL at 06:08

## 2024-08-05 RX ADMIN — CEPHALEXIN 500 MG: 500 CAPSULE ORAL at 09:08

## 2024-08-05 RX ADMIN — NASAL 2 SPRAY: 6.5 SPRAY NASAL at 02:08

## 2024-08-05 NOTE — HPI
Patient is an 83 year old male with a PMHx of CHF, CKD, mechanical heart valve on Coumadin, recurrent epistaxis, anemia, NSTEMI, PVD and type 2 diabetes being admitted to medicine for recurrent epistaxis. Patient reports that epistaxis has been a recurrent issue for some time now. He is established with an ENT.  Patient states that he has daily nosebleeds a typically manages had home with compression with Afrin. Today he reports that his bleeding started approximately 10:00 p.m last night. He reports using almost half a bottle of Afrin to try to get the bleeding to stop and applying compression, but has been unsuccessful. Patient states that this amount of bleeding is unusual as it is causing him to gag and cough up the blood.  He states typically he is able to easily swallow clots that are dislodged. He does endorse some lightheadedness. He denies any chest pain or difficulty breathing.  He denies any abdominal pain, however he does endorse black tarry stools.  He has no other acute concerns at this time. Patient has an extensive surgical history attempting to resolve this issue.  Per patient and wife he has undergone multiple cauteries and ablation of blood vessels without success.    In the ED, vitals stable. Intake labs remarkable for Hgb 6.4, INR 2.8, Cr 2.1. He was type & screened, transfused 1 unit RBCs. ENT consulted and planning for IR embolization tomorrow on 8/6.

## 2024-08-05 NOTE — ED NOTES
Assumed care of pt at this time. VSS, RR even and unlabored. Resting in bed comfortably. No voiced compaints of pain or discomfort at this time. Safety protocols remain, will continue to monitor.     Patient identifiers verified and correct for Dariel Urbina  LOC: The patient is awake, alert and aware of environment with an appropriate affect, the patient is oriented x 4 and speaking appropriately.   APPEARANCE: Patient appears comfortable and in no acute distress, patient is clean and well groomed.  SKIN: The skin is warm and dry, color consistent with ethnicity, patient has normal skin turgor and moist mucus membranes.  MUSCULOSKELETAL: Patient moving all extremities spontaneously, no swelling noted.  RESPIRATORY: Airway is open and patent, respirations are spontaneous, patient has a normal effort and rate, no accessory muscle use noted, pt placed on continuous pulse ox with O2 sats noted at 100% on room air. + Rhino rocket in L nare, dried blood in R nare.  CARDIAC: Pt placed on cardiac monitor. Patient has a normal rate and regular rhythm, no edema noted, capillary refill < 3 seconds. Denies c/p and SOB.  GASTRO: Soft and non tender to palpation, no distention noted, normoactive bowel sounds present in all four quadrants. Pt states bowel movements have been regular.  : Pt denies any pain or frequency with urination.  NEURO: Pt opens eyes spontaneously, behavior appropriate to situation, follows commands, facial expression symmetrical, bilateral hand grasp equal and even, purposeful motor response noted, normal sensation in all extremities when touched with a finger.

## 2024-08-05 NOTE — ED NOTES
Nurses Note -- 4 Eyes      8/5/2024   7:45 AM      Skin assessed during: Q Shift Change      [x] No Altered Skin Integrity Present    []Prevention Measures Documented      [] Yes- Altered Skin Integrity Present or Discovered   [] LDA Added if Not in Epic (Describe Wound)   [] New Altered Skin Integrity was Present on Admit and Documented in LDA   [] Wound Image Taken    Wound Care Consulted? No    Attending Nurse:  Wm Vázquez RN/Staff Member:   Eddie

## 2024-08-05 NOTE — ASSESSMENT & PLAN NOTE
Dariel Urbina is a 83 y.o. male evaluated for Epistaxis on 8/5/2024. Well known to the ENT service for recurrent epistaxis requiring surgical intervention. 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 Previous surgical hx for control of epistaxis with bilateral SP ligation followed by embolization, bilateral facial and internal maxillary artery embolization on 11/03/2023, 12/18/23, left AEA ligation on 2/28/24, and bilateral endoscopic control/cauterization in OR on 6/18/24.     Now with left rhinorocket placed by ED, right merocel placed by ENT. Receiving 1u pRBC given Hgb 6.4. INR therapeutic at 2.8.  Admitted to hospital medicine for obs with packing in place and assist to optimize patient as appropriate prior to surgery   Discussed with staff Dr. Russell, given patient's extensive history of recurrent bleeds will plan for operative intervention Thursday following IR procedure to evaluate nasal cavities - booked for nasal endoscopy with control of nasal hemorrhage.      -- Consult IR for angio possible embolization in light of various interventions including previous embolizations and ligations -- once completed will plan for nasal endoscopy    -- IR  procedure today with possible embolization if appropriate  -- Plan for OR 8/8/24 for nasal endoscopy with control of hemorrhage/cauterization    - please keep NPO at midnight prior to procedure planned for 8/8   - consent obtained, in room/chart and in media tab   - case request placed   -- Please cont anti-staph antibiotics while packing is in place  -- Nasal packing instructions  - Afrin to bilateral nares q8h for the next 48h  - May place mustache dressing under nose for trickling, ok if dressing becomes saturated with some red/brown drainage  - Nasal saline q4h while awake to bilateral packing to keep moist; with nightly application of vaseline/aquaphor to anterior nares  - Keep head of bed elevated  -- Remainder of care per medicine  -- Please page ENT on call  with any additional questions or concerns

## 2024-08-05 NOTE — ED NOTES
Pt still bleeding from right nostril and reports he feels like left is leaking around rhino rocket. MD and resident notified.

## 2024-08-05 NOTE — ASSESSMENT & PLAN NOTE
Dariel Urbina is a 83 y.o. male evaluated for Epistaxis on 8/5/2024. Well known to the ENT service for recurrent epistaxis requiring surgical intervention. 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 Previous surgical hx for control of epistaxis with bilateral SP ligation followed by embolization, bilateral facial and internal maxillary artery embolization on 11/03/2023, 12/18/23, left AEA ligation on 2/28/24, and bilateral endoscopic control/cauterization in OR on 6/18/24.     Now with left rhinorocket placed by ED, right merocel placed by ENT. Receiving 1u pRBC given Hgb 6.4. INR therapeutic today at 2.8.  Discussed with staff Dr. Russell, given patient's extensive history of recurrent bleeds will plan for operative intervention tomorrow - nasal endoscopy with control of nasal hemorrhage.      -- Agree with admission to hospital medicine for obs with packing in place and assist to optimize patient as appropriate prior to surgery tomorrow   -- Plan for OR 8/6/24 for nasal endoscopy with control of hemorrhage/cauterization - discussed with patient and wife   - please keep NPO at midnight (ok for diet 8/5)   - consent obtained, in room/chart and in media tab   - case request placed   -- Nasal saline q2 hours while awake to bilateral packing   -- Afrin to packing q8h   -- Remainder of care per medicine  -- Please page ENT on call with any additional questions or concerns

## 2024-08-05 NOTE — PLAN OF CARE
Problem: Adult Inpatient Plan of Care  Goal: Absence of Hospital-Acquired Illness or Injury  Outcome: Progressing  Intervention: Identify and Manage Fall Risk  Flowsheets (Taken 8/5/2024 1748)  Safety Promotion/Fall Prevention:   assistive device/personal item within reach   commode/urinal/bedpan at bedside   diversional activities provided   Fall Risk reviewed with patient/family   family expresses understanding of fall risk and prevention   family to remain at bedside   high risk medications identified   in recliner, wheels locked   instructed to call staff for mobility   lighting adjusted   medications reviewed   muscle strengthening facilitated   nonskid shoes/socks when out of bed   patient expresses understanding of fall risk and prevention   room near unit station   side rails raised x 2  Intervention: Prevent Skin Injury  Flowsheets (Taken 8/5/2024 1748)  Body Position: position changed independently  Skin Protection:   incontinence pads utilized   pulse oximeter probe site changed  Device Skin Pressure Protection:   absorbent pad utilized/changed   positioning supports utilized  Intervention: Prevent and Manage VTE (Venous Thromboembolism) Risk  Flowsheets (Taken 8/5/2024 1748)  VTE Prevention/Management:   remove, assess skin, and reapply sequential compression device   ambulation promoted   bleeding precations maintained   bleeding risk assessed   bleeding risk factor(s) identified, provider notified   dorsiflexion/plantar flexion performed   fluids promoted   ROM (active) performed  Intervention: Prevent Infection  Flowsheets (Taken 8/5/2024 1748)  Infection Prevention:   cohorting utilized   environmental surveillance performed   equipment surfaces disinfected   hand hygiene promoted   single patient room provided   rest/sleep promoted  Goal: Optimal Comfort and Wellbeing  Outcome: Progressing  Intervention: Monitor Pain and Promote Comfort  Flowsheets (Taken 8/5/2024 1748)  Pain Management  Interventions:   around-the-clock dosing utilized   awakened for pain meds per patient request   care clustered   diversional activity provided   medication offered   medication offered but refused   pillow support provided   position adjusted   pain management plan reviewed with patient/caregiver   premedicated for activity   prescribed exercises encouraged   quiet environment facilitated   relaxation techniques promoted   warm blanket provided  Intervention: Provide Person-Centered Care  Flowsheets (Taken 8/5/2024 1748)  Trust Relationship/Rapport:   care explained   questions encouraged   reassurance provided   choices provided   emotional support provided   thoughts/feelings acknowledged   empathic listening provided   questions answered     Problem: Infection  Goal: Absence of Infection Signs and Symptoms  Outcome: Progressing  Intervention: Prevent or Manage Infection  Flowsheets (Taken 8/5/2024 1748)  Fever Reduction/Comfort Measures:   humidification temperature decreased   lightweight bedding   lightweight clothing   fluid intake increased  Infection Management: aseptic technique maintained     Problem: Diabetes Comorbidity  Goal: Blood Glucose Level Within Targeted Range  Outcome: Progressing  Intervention: Monitor and Manage Glycemia  Flowsheets (Taken 8/5/2024 1748)  Glycemic Management:   blood glucose monitored   supplemental insulin given   oral hydration promoted     Problem: Acute Kidney Injury/Impairment  Goal: Improved Oral Intake  Outcome: Progressing  Intervention: Promote and Optimize Oral Intake  Flowsheets (Taken 8/5/2024 1748)  Oral Nutrition Promotion:   adaptive equipment use encouraged   calorie-dense liquids provided   social interaction promoted   rest periods promoted   physical activity promoted  Nutrition Interventions:   meals from home/family encouraged   supplemental foods provided   referred to nutrition assistant/tech   supplemental drinks provided   meal set-up provided    frequent small meals provided   food preferences provided   diet adjusted  Goal: Effective Renal Function  Outcome: Progressing  Intervention: Monitor and Support Renal Function  Flowsheets (Taken 8/5/2024 1748)  Stabilization Measures:   provider notified   verbal stimulation provided  Medication Review/Management:   medications reviewed   high-risk medications identified   provider consulted   pharmacy consulted     Problem: Fall Injury Risk  Goal: Absence of Fall and Fall-Related Injury  Outcome: Progressing  Intervention: Identify and Manage Contributors  Flowsheets (Taken 8/5/2024 1748)  Self-Care Promotion:   independence encouraged   adaptive equipment use encouraged   BADL personal objects within reach   BADL personal routines maintained   meal set-up provided  Medication Review/Management:   medications reviewed   high-risk medications identified   provider consulted   pharmacy consulted  Intervention: Promote Injury-Free Environment  Flowsheets (Taken 8/5/2024 1748)  Safety Promotion/Fall Prevention:   assistive device/personal item within reach   commode/urinal/bedpan at bedside   diversional activities provided   Fall Risk reviewed with patient/family   family expresses understanding of fall risk and prevention   family to remain at bedside   high risk medications identified   in recliner, wheels locked   instructed to call staff for mobility   lighting adjusted   medications reviewed   muscle strengthening facilitated   nonskid shoes/socks when out of bed   patient expresses understanding of fall risk and prevention   room near unit station   side rails raised x 2

## 2024-08-05 NOTE — ED PROVIDER NOTES
Encounter Date: 8/5/2024       History     Chief Complaint   Patient presents with    Epistaxis     Started at 10pm last night; has nose clamp on from home; is vomiting clots; d/'d a mos ago for same issue; +coumadin     Mr. Watson is an 83-year-old male with past medical history significant for CHF, CKD, mechanical heart valve on Coumadin, recurrent epistaxis, anemia, NSTEMI, PVD and type 2 diabetes is presenting to the ED with epistaxis.    Patient reports that epistaxis has been a recurrent issue for some time now.  He is established with an ENT.  Patient states that he has almost daily nosebleeds he typically manages at home with compression with Afrin.  Today he reports that his bleeding started approximately 10:00 p.m..  He reports using almost half a bottle of Afrin to try to get the bleeding to stop and applying compression, but has been unsuccessful.  Patient states that this amount of bleeding is unusual as it is causing him to gag and cough up the blood.  He states typically he is able to easily swallow clots that are dislodged.  He does endorse some lightheadedness.  He denies any chest pain or difficulty breathing.  He denies any abdominal pain, however he does endorse black tarry stools after swallowing blood.  He has no other acute concerns at this time.    Patient has an extensive surgical history attempting to resolve this issue.  Per patient and wife he has undergone multiple cauteries and ablation of blood vessels without success.  Of note they do have an appointment with their ENT at 1000 this AM.    The history is provided by the patient, the spouse and medical records. No  was used.     Review of patient's allergies indicates:   Allergen Reactions    Lisinopril     Losartan      Intolerance- elevates potassium level     Past Medical History:   Diagnosis Date    Anemia of chronic renal failure, stage 3 (moderate) 05/27/2015    Anticoagulant long-term use     Atherosclerosis  of coronary artery bypass graft of native heart without angina pectoris 09/11/2012    3-27-18 [facility] Two vessel coronary artery disease.   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    Bilateral carotid artery disease 02/09/2017    Bleeding from the nose     Bleeding nose 03/21/2018    Cancer     Cataract     CHF (congestive heart failure)     CKD (chronic kidney disease) stage 3, GFR 30-59 ml/min 05/27/2015    Claudication of left lower extremity 09/17/2014    Colon polyp     Encounter for blood transfusion     Gastroesophageal reflux disease without esophagitis 03/19/2018    Gastrointestinal hemorrhage associated with intestinal diverticulosis 04/01/2018    Glaucoma     H/O mechanical aortic valve replacement 09/17/2014    History of gout 09/26/2012    Hyperparathyroidism due to renal insufficiency 07/27/2015    Internal hemorrhoid 04/03/2018    Long term current use of anticoagulant therapy 09/26/2012    Mechanical heart valve present     Metabolic acidosis with normal anion gap and bicarbonate losses 03/20/2018    Mixed hyperlipidemia 09/26/2012    NSTEMI (non-ST elevated myocardial infarction) 03/21/2018    Obesity, diabetes, and hypertension syndrome 02/23/2016    Paroxysmal atrial fibrillation 02/06/2023    PVD (peripheral vascular disease) 09/11/2012    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     Past Surgical History:   Procedure Laterality Date    BACK SURGERY      CARDIAC CATHETERIZATION      CARDIAC VALVE REPLACEMENT      CARDIAC VALVE SURGERY      CARPAL TUNNEL RELEASE Right 05/19/2020    Procedure: RELEASE, CARPAL TUNNEL;  Surgeon: Rupesh Norris Jr., MD;   Location: [facility] OR;  Service: Plastics;  Laterality: Right;    CATARACT EXTRACTION Left 11/13/2022        COLON SURGERY      COLONOSCOPY N/A 03/31/2017    Procedure: COLONOSCOPY;  Surgeon: Bruno Raymond MD;  Location: [facility] ENDO (4TH FLR);  Service: Endoscopy;  Laterality: N/A;  Patient's wife requesting date.    COLONOSCOPY N/A 04/03/2018    Procedure: COLONOSCOPY;  Surgeon: Bonifacio Pelletier MD;  Location: [facility] ENDO (2ND FLR);  Service: Endoscopy;  Laterality: N/A;    COLONOSCOPY N/A 08/13/2018    Procedure: COLONOSCOPY;  Surgeon: Kam Barab MD;  Location: [facility] ENDO (2ND FLR);  Service: Endoscopy;  Laterality: N/A;  2nd floor: PA pressure 49; hx of moderate-severe valve disease     per Coumadin clinic-Patient can hold 5 days with lovenox bridge       ok to schedule per Katarina    CONTROL OF EPISTAXIS, POSTERIOR, USING NASAL PACKING OR CAUTERIZATION Bilateral 6/18/2024    Procedure: CONTROL OF EPISTAXIS, POSTERIOR, USING NASAL PACKING OR CAUTERIZATION;  Surgeon: Jono Russell MD;  Location: [facility] 2ND FLR;  Service: ENT;  Laterality: Bilateral;    CORONARY ANGIOGRAPHY N/A 10/04/2021    Procedure: Left heart cath +/- peripheral angiogram;  Surgeon: Jose Ruiz MD;  Location: [facility] CATH LAB;  Service: Cardiology;  Laterality: N/A;    CORONARY ANGIOGRAPHY N/A 3/2/2023    Procedure: Angiogram, Coronary;  Surgeon: Devon Garnica MD;  Location: [facility] CATH LAB;  Service: Cardiology;  Laterality: N/A;    CORONARY ANGIOPLASTY      CORONARY ARTERY BYPASS GRAFT      CORONARY BYPASS GRAFT ANGIOGRAPHY  10/04/2021    Procedure: Bypass graft study;  Surgeon: Jose Ruiz MD;  Location: [facility] CATH LAB;  Service: Cardiology;;    CORONARY BYPASS GRAFT ANGIOGRAPHY  3/2/2023    Procedure: Bypass graft study;  Surgeon: Devon Garnica MD;  Location: [facility] CATH LAB;  Service: Cardiology;;    ECHOCARDIOGRAM,TRANSESOPHAGEAL N/A 4/14/2023    Procedure: Transesophageal echo (KIRSTEN) intra-procedure log documentation;   Surgeon: [facility] Diagnostic Provider;  Location: [facility] EP LAB;  Service: Cardiology;  Laterality: N/A;    ENDOSCOPIC NASAL CAUTERIZATION Bilateral 11/3/2023    Procedure: CAUTERIZATION, NOSE, ENDOSCOPIC;  Surgeon: Jono Russell MD;  Location: [facility] OR 2ND FLR;  Service: ENT;  Laterality: Bilateral;    ENDOSCOPIC NASAL CAUTERIZATION N/A 12/18/2023    Procedure: CAUTERIZATION, NOSE, ENDOSCOPIC;  Surgeon: Jono Russell MD;  Location: [facility] OR 2ND FLR;  Service: ENT;  Laterality: N/A;    EYE SURGERY      FESS, WITH IMAGING GUIDANCE Left 2/28/2024    Procedure: FESS, WITH IMAGING GUIDANCE;  Surgeon: Jono Russell MD;  Location: [facility] OR [facility] FLR;  Service: ENT;  Laterality: Left;  Scan loaded ENT [employer]. CL    FUNCTIONAL ENDOSCOPIC SINUS SURGERY (FESS) Bilateral 6/14/2023    Procedure: FESS (FUNCTIONAL ENDOSCOPIC SINUS SURGERY);  Surgeon: Jono Russell MD;  [facility];  Service: ENT;  Laterality: Bilateral;    HERNIA REPAIR      INTRAOCULAR PROSTHESES INSERTION Left 11/13/2022    Procedure: INSERTION, IOL PROSTHESIS;  Surgeon: Alia Mckeon MD;  Location: [facility] OR 1ST FLR;  Service: Ophthalmology;  Laterality: Left;    INTRAOCULAR PROSTHESES INSERTION Right 12/04/2022    Procedure: INSERTION, IOL PROSTHESIS;  Surgeon: Alia Mckeon MD;  Location: [facility] OR 1ST FLR;  Service: Ophthalmology;  Laterality: Right;    LIGATION, ARTERY, ETHMOIDAL Left 2/28/2024    Procedure: LIGATION, ARTERY, ETHMOIDAL;  Surgeon: Jono Russell MD;  Location: [facility] OR 2ND FLR;  Service: ENT;  Laterality: Left;    LIGATION, ARTERY, SPHENOPALATINE, ENDOSCOPIC Bilateral 6/14/2023    Procedure: LIGATION, ARTERY, SPHENOPALATINE, ENDOSCOPIC;  Surgeon: Jono Russell MD;  Location: [facility] OR 2ND FLR;  Service: ENT;  Laterality: Bilateral;    PHACOEMULSIFICATION OF CATARACT Left 11/13/2022    Procedure: PHACOEMULSIFICATION, CATARACT;  Surgeon: Alia Mckeon MD;  [facility];  Service: Ophthalmology;   Laterality: Left;    PHACOEMULSIFICATION OF CATARACT Right 12/04/2022    Procedure: PHACOEMULSIFICATION, CATARACT;  Surgeon: Alia Mckeon MD;  Location: [facility] OR [address] [place] [address];  Service: Ophthalmology;  Laterality: Right;    SPINE SURGERY      TRANSESOPHAGEAL ECHOCARDIOGRAPHY N/A 3/22/2023    Procedure: ECHOCARDIOGRAM, TRANSESOPHAGEAL;  Surgeon: [facility] Diagnostic Provider;  Location: [facility] EP LAB;  Service: Anesthesiology;  Laterality: N/A;    TRANSESOPHAGEAL ECHOCARDIOGRAPHY N/A 4/11/2023    Procedure: ECHOCARDIOGRAM, TRANSESOPHAGEAL;  Surgeon: [facility] Diagnostic Provider;  Location: [facility] EP LAB;  Service: Anesthesiology;  Laterality: N/A;    VASECTOMY       Family History   Problem Relation Name Age of Onset    Heart failure Mother      Heart disease Mother      Heart failure Father      Heart disease Father      Alcohol abuse Father      Heart failure Brother      Heart disease Brother      Diabetes Brother      No Known Problems Sister      No Known Problems Maternal Grandmother      No Known Problems Maternal Grandfather      No Known Problems Paternal Grandmother      No Known Problems Paternal Grandfather      Heart disease Sister      No Known Problems Maternal Aunt      No Known Problems Maternal Uncle      No Known Problems Paternal Aunt      No Known Problems Paternal Uncle      Amblyopia Neg Hx      Blindness Neg Hx      Cancer Neg Hx      Cataracts Neg Hx      Glaucoma Neg Hx      Hypertension Neg Hx      Macular degeneration Neg Hx      Retinal detachment Neg Hx      Strabismus Neg Hx      Stroke Neg Hx      Thyroid disease Neg Hx      Anemia Neg Hx      Arrhythmia Neg Hx      Asthma Neg Hx      Clotting disorder Neg Hx      Fainting Neg Hx      Heart attack Neg Hx      Hyperlipidemia Neg Hx      Atrial Septal Defect Neg Hx      Melanoma Neg Hx       Social History     Tobacco Use    Smoking status: Former     Current packs/day: 0.00     Average packs/day: 1 pack/day for 20.0 years (20.0 ttl pk-yrs)     Types:  Cigarettes     Start date: 1960     Quit date: 1980     Years since quittin.9     Passive exposure: Never    Smokeless tobacco: Never   Substance Use Topics    Alcohol use: No    Drug use: No         Physical Exam     Initial Vitals [24 0235]   BP Pulse Resp Temp SpO2   (!) 165/67 60 17 97.6 °F (36.4 °C) 100 %      MAP       --         Physical Exam    Nursing note and vitals reviewed.  Constitutional: He appears well-developed.   HENT:   Nose: Epistaxis is observed.   Nose clip in place. Bleeding appears to be coming from L nare. Patient has obvious blood in his mouth and frequently coughs/spits up bloody sputum.   Eyes: EOM are normal. Pupils are equal, round, and reactive to light.   Cardiovascular:  Normal rate and regular rhythm.           Murmur heard.  Pulmonary/Chest: Breath sounds normal. No respiratory distress. He has no wheezes. He has no rhonchi. He has no rales.   Abdominal: Abdomen is soft. Bowel sounds are normal. He exhibits no distension. There is no abdominal tenderness.   Musculoskeletal:         General: Normal range of motion.     Neurological: He is alert and oriented to person, place, and time.   Skin: Skin is warm and dry. Capillary refill takes less than 2 seconds.   Psychiatric: He has a normal mood and affect.         ED Course   Epistaxis Mgmt    Date/Time: 2024 7:33 AM    Performed by: Mary Beth Wallace MD  Authorized by: Logan Humphrey MD  Consent Done: Yes  Consent: Verbal consent obtained.  Consent given by: patient    Patient sedated: no  Treatment site: left anterior  Repair method: anterior pack  Post-procedure assessment: bleeding decreased  Treatment complexity: simple  Recurrence: recurrence of recent bleed  Patient tolerance: Patient tolerated the procedure well with no immediate complications  Comments: Presenting with anterior left night bleed.  Patient has already trialed prolonged compression and Afrin without success discussed options for  management and patient is agreeable to packing.  Verbal consent was obtained.  No anesthesia was used for sedation.  Anterior pack was placed in left nostril without difficulty.  Patient was reassessed with decreased bleeding, however continues to ooze.  This is a recurrence of a chronic issue.  ENT was consulted.  Patient tolerated procedure well without immediate complications.        Labs Reviewed   CBC W/ AUTO DIFFERENTIAL - Abnormal       Result Value    WBC 7.22      RBC 2.15 (*)     Hemoglobin 6.4 (*)     Hematocrit 21.5 (*)      (*)     MCH 29.8      MCHC 29.8 (*)     RDW 17.9 (*)     Platelets 159      MPV 10.5      Immature Granulocytes 0.4      Gran # (ANC) 5.3      Immature Grans (Abs) 0.03      Lymph # 1.1      Mono # 0.7      Eos # 0.1      Baso # 0.02      nRBC 0      Gran % 73.1 (*)     Lymph % 15.5 (*)     Mono % 9.6      Eosinophil % 1.1      Basophil % 0.3      Differential Method Automated     PROTIME-INR - Abnormal    Prothrombin Time 29.0 (*)     INR 2.8 (*)    APTT - Abnormal    aPTT 35.2 (*)    COMPREHENSIVE METABOLIC PANEL - Abnormal    Sodium 139      Potassium 4.3      Chloride 109      CO2 21 (*)     Glucose 134 (*)     BUN 48 (*)     Creatinine 2.1 (*)     Calcium 9.8      Total Protein 6.6      Albumin 2.9 (*)     Total Bilirubin 0.4      Alkaline Phosphatase 78      AST 16      ALT 14      eGFR 30.8 (*)     Anion Gap 9     POCT GLUCOSE, HAND-HELD DEVICE   TYPE & SCREEN    Group & Rh O NEG      Indirect Justyna NEG      Specimen Outdate 08/08/2024 23:59     PREPARE RBC SOFT    UNIT NUMBER [id number]      Product Code G9930F29      DISPENSE STATUS ISSUED      CODING SYSTEM [id number]      Unit Blood Type Code 9500      Unit Blood Type O NEG      Unit Expiration [id number]      CROSSMATCH INTERPRETATION Compatible       EKG Readings: (Independently Interpreted)   Initial Reading: No STEMI. Previous EKG: Compared with most recent EKG Previous EKG Date: 5/31/2024. Rhythm: Atrial  Fibrillation. Heart Rate: 66. Clinical Impression: Atrial Fibrillation and Movement Artifact     ECG Results              EKG 12-lead (Final result)        Collection Time Result Time QRS Duration OHS QTC Calculation    08/05/24 02:39:03 08/05/24 11:32:46 154 499                     Final result by Interface, Lab In [facility] (08/05/24 11:32:49)                   Narrative:    Test Reason : R58,    Vent. Rate : 066 BPM     Atrial Rate : 000 BPM     P-R Int : 000 ms          QRS Dur : 154 ms      QT Int : 476 ms       P-R-T Axes : 000 -16 007 degrees     QTc Int : 499 ms    Atrial fibrillation  Left bundle branch block  Abnormal ECG  When compared with ECG of 31-MAY-2024 09:14,  Questionable change in The axis  T wave inversion no longer evident in Lateral leads  Confirmed by Jeremias APARICIO MD ([id number]) on 8/5/2024 11:32:41 AM    Referred By: [email or web address]   SELF           Confirmed By:Jeremias APARICIO MD                                  Imaging Results    None          Medications   0.9%  NaCl infusion (for blood administration) (has no administration in time range)   sodium chloride 0.9% flush 5 mL (has no administration in time range)   albuterol-ipratropium 2.5 mg-0.5 mg/3 mL nebulizer solution 3 mL (has no administration in time range)   melatonin tablet 6 mg (has no administration in time range)   ondansetron disintegrating tablet 8 mg (has no administration in time range)   prochlorperazine injection Soln 5 mg (has no administration in time range)   bisacodyL suppository 10 mg (has no administration in time range)   simethicone chewable tablet 80 mg (has no administration in time range)   aluminum-magnesium hydroxide-simethicone 200-200-20 mg/5 mL suspension 30 mL (has no administration in time range)   acetaminophen tablet 650 mg (has no administration in time range)   acetaminophen tablet 1,000 mg (has no administration in time range)   naloxone 0.4 mg/mL injection 0.02 mg (has no administration in time range)   glucose  chewable tablet 16 g (has no administration in time range)   glucose chewable tablet 24 g (has no administration in time range)   glucagon (human recombinant) injection 1 mg (has no administration in time range)   insulin aspart U-100 pen 0-5 Units (has no administration in time range)   oxymetazoline 0.05 % nasal spray 2 spray (has no administration in time range)   cephALEXin capsule 500 mg (500 mg Oral Given 8/5/24 1133)   sodium chloride 0.65 % nasal spray 2 spray (has no administration in time range)   allopurinoL tablet 100 mg (100 mg Oral Given 8/5/24 1133)   bumetanide tablet 2 mg (has no administration in time range)   ferrous gluconate tablet 324 mg (has no administration in time range)   metoprolol succinate (TOPROL-XL) 24 hr tablet 25 mg (25 mg Oral Given 8/5/24 1133)   isosorbide mononitrate 24 hr tablet 60 mg (has no administration in time range)     Medical Decision Making  An 83-year-old male with past medical history significant for mechanical heart valve on warfarin, recurrent epistaxis, CHF, NSTEMI, type 2 diabetes is presenting with epistaxis that at the time of presentation has been ongoing for approximately 6 hours.  Patient is hypertensive on arrival but otherwise vital signs are normal.  On exam patient has continue bleeding from his left nares, blood in his mouth, patient's coughing and spitting bloody sputum.  Given patient's anticoagulation and persistent bleeding despite compression and Afrin plan to obtain CBC, CMP, PT/INR, and a PTT    Differential including, but not limited to:  Anemia, anterior epistaxis, posterior epistaxis, coagulopathy     CBC notable for acute on chronic anemia with a hemoglobin of 6.8.  Patient was consented for blood transfusion.  Patient was ordered 1 unit of RBCs.  Patient has prolonged compression and trialed Afrin and continues to bleed.  Your decision making to place packing anterior rhino rocket placed in left nostril.  Please refer to procedure note for  further detail.  On reassessment patient continues to ooze slowly from both left and right nostril, however is improved from previous.  Given persistence ENT was consulted.    Patient will require admission for his acute on chronic anemia and persistent epistaxis.  At this time utilization manager is aware of this patient patient was signed out to oncoming team.    Amount and/or Complexity of Data Reviewed  Independent Historian: spouse  External Data Reviewed: radiology.     Details: 2024 ECHO:  There is low normal systolic function with a visually estimated ejection fraction of 50 - 55%.   Labs: ordered. Decision-making details documented in ED Course.  ECG/medicine tests: ordered and independent interpretation performed.    Risk  Prescription drug management.  Decision regarding hospitalization.              Attending Attestation:   Physician Attestation Statement for Resident:  As the supervising MD   Physician Attestation Statement: I have personally seen and examined this patient.   I agree with the above history.  -: 83-year-old male presenting with epistaxis.  Denies facial trauma.  Noticed bleeding from his left nostril.  Tried Afrin and direct pressure at home with a clamp.  He notices his stool looks dark after he swallows blood from the epistaxis.   As the supervising MD I agree with the above PE.   -: No evidence of facial trauma  Bleeding from left nostril  Irregularly irregular rhythm, lungs clear  Abdomen soft  After direct pressure, no posterior or pharyngeal blood noted on exam   As the supervising MD I agree with the above treatment, course, plan, and disposition.   -: INR therapeutic.  Labs notable for anemia, consented in ordered for blood transfusion.  Replaced clamp with direct pressure for several minutes with some improvement.    With the patient's permission, placed left nasal packing.  On reassessment, still with mild ooze, inflated balloon further.  Patient has required interventions in  the past for his epistaxis, consult placed to ENT, who evaluated the patient in the emergency department.    Signed out to oncoming team pending ENT evaluation and will plan for admission to trend hemoglobin.  Suspect the patient is seeing dark stool secondary to swallowing line from epistaxis, lower suspicion for GI bleed, but would reconsider if no improvement in anemia after epistaxis has been controlled.  I was personally present during the entire procedure.  I have reviewed and agree with the residents interpretation of the following: lab data and EKG.  I have reviewed the following: old records at this facility.        Attending Critical Care:   Critical Care Times:   Direct Patient Care (initial evaluation, reassessments, and time considering the case)................................................................15 minutes.   Additional History from reviewing old medical records or taking additional history from the family, EMS, PCP, etc.......................5 minutes.   Ordering, Reviewing, and Interpreting Diagnostic Studies...............................................................................................................4 minutes.   Documentation..................................................................................................................................................................................5 minutes.   Consultation with other Physicians. .................................................................................................................................................3 minutes.   ==============================================================  Total Critical Care Time - exclusive of procedural time: 32 minutes.  ==============================================================  Critical care reasons: Acute blood loss anemia requiring blood transfusion.   Critical care was time spent personally by me on the following activities: obtaining history from  patient or relative, examination of patient, review of old charts, development of treatment plan with patient or relative, ordering and performing treatments and interventions, evaluation of patient's response to treatment, discussion with consultants and re-evaluation of patient's conition.   Critical Care Condition: potentially life-threatening           ED Course as of 08/05/24 1321   Mon Aug 05, 2024   0447 INR(!): 2.8  Therapeutic. Goal of 2-3 for mechanical valve [AC]   0448 PTT(!): 35.2 [AC]   0513 Hemoglobin(!): 6.4  Notified patient of finding and recommendation for blood transfusion.  Patient is agreeable with transfusion.  Rhino rocket placed in left nostril.  Approximately 12 cc of air use to endplate balloon.  Patient tolerated procedure without any immediate complications.  Plan to reassess to confirm cessation of bleeding. [AC]   0515 Creatinine(!): 2.1  At baseline [AC]   0635 Hemoglobin(!): 6.4  Most recent 7.9 [AB]   0703 Oozing from right and left long shows improved but still persistent.  Plan to consult ENT. [AC]   0708 Discussed case with ent resident. Will come to assess at bedside [AC]      ED Course User Index  [AB] Logan Humphrey MD  [AC] Mary Beth Wallace MD                           Clinical Impression:  Final diagnoses:  [R58] Bleeding  [R04.0] Epistaxis (Primary)  [D62] Acute blood loss anemia          ED Disposition Condition    Admit Stable               Plan discussed with Dr. Kam Wallace MD  Emergency Medicine, PGY1       Mary Beth Wallace MD  Resident  08/05/24 0738       Logan Humphrey MD  08/05/24 1321

## 2024-08-05 NOTE — ASSESSMENT & PLAN NOTE
Patient with Paroxysmal (<7 days) atrial fibrillation which is controlled currently with Beta Blocker. Patient is currently in sinus rhythm.[id number] Score: 4.  Anticoagulation indicated. Anticoagulation done with coumadin .

## 2024-08-05 NOTE — ANESTHESIA PREPROCEDURE EVALUATION
[facility]  Anesthesia Pre-Operative Evaluation         Patient Name: Dariel Urbina  YOB: 1940  MRN: [id number]    SUBJECTIVE:     Pre-operative evaluation for Procedure(s) (LRB):  CONTROL OF EPISTAXIS, POSTERIOR, USING NASAL PACKING OR CAUTERIZATION (Bilateral)  ENDOSCOPY, NOSE (N/A)     08/05/2024    Dariel Urbina is a 83 y.o. male w/ a significant PMHx of CHF, CKD, mechanical heart valve on Coumadin, recurrent epistaxis, anemia, NSTEMI, PVD and type 2 diabetes is presenting to the ED with epistaxis. .    Patient now presents for the above procedure(s).    Echo May 2024:      Left Ventricle: The left ventricle is normal in size. Normal wall thickness. There is concentric hypertrophy. Septal motion is consistent with post-operative status. There is low normal systolic function with a visually estimated ejection fraction of 50 - 55%. Biplane (2D) method of discs ejection fraction is 51%.    Right Ventricle: Mild right ventricular enlargement. Wall thickness is normal. Systolic function is normal.    Left Atrium: Left atrium is severely dilated.    Right Atrium: Right atrium is severely dilated.    Aortic Valve: There is a mechanical valve in the aortic position. Aortic valve area by VTI is 0.87 cm². Aortic valve peak velocity is 3.02 m/s. Mean gradient is 19 mmHg. The dimensionless index is 0.31. There is trace aortic regurgitation.    Mitral Valve: There is mild bileaflet sclerosis. There is moderate mitral annular calcification present. There is moderate to severe regurgitation with a centrally directed jet.    Tricuspid Valve: There is moderate regurgitation.    Pulmonary Artery: There is moderate pulmonary hypertension. The estimated pulmonary artery systolic pressure is 57 mmHg.    IVC/SVC: Intermediate venous pressure at 8 mmHg.     LDA:        Peripheral IV - Single Lumen 08/05/24 0336 18 G Anterior;Right Forearm (Active)   Site Assessment Clean;Dry;Intact 08/05/24  0336   Extremity Assessment Distal to IV No abnormal discoloration;No redness;No swelling 08/05/24 0336   Dressing Status Clean;Dry;Intact 08/05/24 0336   Dressing Intervention Integrity maintained 08/05/24 0336   Number of days: 0            Peripheral IV - Single Lumen 08/05/24 0701 20 G Anterior;Left Forearm (Active)   Number of days: 0       Prev airway:     Prev airway: 2/28/2024     Mask Ventilation:  Not attempted    Method of Intubation:  Video laryngoscopy    Blade:  Osorio 3    Laryngeal View Grade: Grade I - full view of cords      Difficult Airway Encountered?: No      Complications:  None    Placement Verified By:  Capnometry and Fiber optic visualization    Drips:     Patient Active Problem List   Diagnosis    Chronic anticoagulation    Hyperlipidemia associated with type 2 diabetes mellitus    History of colon resection    Renovascular hypertension    History of gout    Type 2 diabetes mellitus with stage 4 chronic kidney disease, without long-term current use of insulin    H/O mechanical aortic valve replacement    Atherosclerosis of native artery of extremity with intermittent claudication    Stage 3b chronic kidney disease    Type 2 diabetes mellitus with diabetic peripheral angiopathy without gangrene    Metabolic acidosis with normal anion gap and bicarbonate losses    NSTEMI (non-ST elevated myocardial infarction)    Epistaxis    Gastrointestinal hemorrhage associated with intestinal diverticulosis    Hx of colonic polyp    Hypertension associated with diabetes    Bilateral carpal tunnel syndrome    Numbness and tingling in both hands    Severe carpal tunnel syndrome of right wrist    Anemia    Acute kidney injury superimposed on chronic kidney disease    Acute blood loss anemia    Recurrent epistaxis    Supratherapeutic INR    Coronary artery disease involving native coronary artery of native heart without angina pectoris    Paroxysmal atrial fibrillation    Mitral insufficiency    Dysphagia     Secondary hyperparathyroidism of renal origin    Dysphonia    ACP (advance care planning)    Chronic kidney disease, stage 4 (severe)    Anemia of chronic renal failure    Hyperuricemia    Nephrolithiasis       Review of patient's allergies indicates:   Allergen Reactions    Lisinopril     Losartan      Intolerance- elevates potassium level       Current Inpatient Medications:   allopurinoL  100 mg Oral Daily    bumetanide  2 mg Oral Every other day    cephALEXin  500 mg Oral Q8H    [START ON 8/6/2024] ferrous gluconate  324 mg Oral Daily with breakfast    isosorbide mononitrate  60 mg Oral QHS    metoprolol succinate  25 mg Oral Daily    oxymetazoline  2 spray Each Nostril Q8H    sodium chloride  2 spray Each Nostril Q4H       Current Facility-Administered Medications on File Prior to Encounter   Medication Dose Route Frequency Provider Last Rate Last Admin    ceFAZolin 2 g in dextrose 5 % in water (D5W) 50 mL IVPB (MB+)  2 g Intravenous On Call Procedure Yenifer Sandoval MD        HYDROmorphone injection 0.2 mg  0.2 mg Intravenous Q5 Min PRN Saeed Farrell MD        sodium chloride 0.9% flush 10 mL  10 mL Intravenous PRN Saeed Farrell MD         Current Outpatient Medications on File Prior to Encounter   Medication Sig Dispense Refill    acetaminophen (TYLENOL) 500 MG tablet Take 500 mg by mouth daily as needed for Pain.      allopurinoL (ZYLOPRIM) 100 MG tablet Take 1 tablet (100 mg total) by mouth once daily. 90 tablet 3    bumetanide (BUMEX) 1 MG tablet Take 2 tablets (2 mg total) by mouth every other day. 90 tablet 3    ferrous gluconate (FERGON) 324 MG tablet TAKE 1 TABLET EVERY DAY WITH BREAKFAST 90 tablet 3    isosorbide mononitrate (IMDUR) 60 MG 24 hr tablet Take 1 tablet (60 mg total) by mouth every evening. 90 tablet 3    metoprolol succinate (TOPROL-XL) 25 MG 24 hr tablet TAKE 1 TABLET ONE TIME DAILY 90 tablet 3    omega-3 fatty acids/fish oil (FISH OIL-OMEGA-3 FATTY ACIDS) 300-1,000 mg  capsule Take 1 capsule by mouth once daily.      rosuvastatin (CRESTOR) 40 MG Tab TAKE 1 TABLET EVERY EVENING. 90 tablet 3    sodium chloride (OCEAN) 0.65 % nasal spray 1 spray by Nasal route as needed for Congestion. 44 mL 12    sodium chloride (SALINE NASAL) 0.65 % nasal spray 2 sprays by Nasal route 3 (three) times daily. 44 mL 3    traZODone (DESYREL) 50 MG tablet Take 1 tablet (50 mg total) by mouth every evening. 90 tablet 2    warfarin (COUMADIN) 5 MG tablet TAKE 1/2 TABLET (2.5MG) SATURDAY, THEN TAKE 1 TABLET (5MG) ALL OTHER DAYS OR AS INSTRUCTED BY COUMADIN CLINIC (Patient taking differently: Take 5 mg by mouth every evening.) 90 tablet 3       Past Surgical History:   Procedure Laterality Date    BACK SURGERY      CARDIAC CATHETERIZATION      CARDIAC VALVE REPLACEMENT      CARDIAC VALVE SURGERY      CARPAL TUNNEL RELEASE Right 05/19/2020    Procedure: RELEASE, CARPAL TUNNEL;  Surgeon: Rupesh Norris Jr., MD;  Location: [facility];  Service: Plastics;  Laterality: Right;    CATARACT EXTRACTION Left 11/13/2022        COLON SURGERY      COLONOSCOPY N/A 03/31/2017    Procedure: COLONOSCOPY;  Surgeon: Bruno Raymond MD;  Location: [facility] (4TH FLR);  Service: Endoscopy;  Laterality: N/A;  Patient's wife requesting date.    COLONOSCOPY N/A 04/03/2018    Procedure: COLONOSCOPY;  Surgeon: Bonifacio Pelletier MD;  Location: [facility] ENDO (2ND FLR);  Service: Endoscopy;  Laterality: N/A;    COLONOSCOPY N/A 08/13/2018    Procedure: COLONOSCOPY;  Surgeon: Kam Barba MD;  Location: [facility] ENDO (2ND FLR);  Service: Endoscopy;  Laterality: N/A;  2nd floor: PA pressure 49; hx of moderate-severe valve disease     per Coumadin clinic-Patient can hold 5 days with lovenox bridge       ok to schedule per Katarina    CONTROL OF EPISTAXIS, POSTERIOR, USING NASAL PACKING OR CAUTERIZATION Bilateral 6/18/2024    Procedure: CONTROL OF EPISTAXIS, POSTERIOR, USING NASAL PACKING OR CAUTERIZATION;  Surgeon: Jono Russell,  MD;  Location: [facility] OR [facility] FLR;  Service: ENT;  Laterality: Bilateral;    CORONARY ANGIOGRAPHY N/A 10/04/2021    Procedure: Left heart cath +/- peripheral angiogram;  Surgeon: Jose Ruiz MD;  Location: [facility] CATH LAB;  Service: Cardiology;  Laterality: N/A;    CORONARY ANGIOGRAPHY N/A 3/2/2023    Procedure: Angiogram, Coronary;  Surgeon: Devon Garnica MD;  Location: [facility] CATH LAB;  Service: Cardiology;  Laterality: N/A;    CORONARY ANGIOPLASTY      CORONARY ARTERY BYPASS GRAFT      CORONARY BYPASS GRAFT ANGIOGRAPHY  10/04/2021    Procedure: Bypass graft study;  Surgeon: Jose Ruiz MD;  Location: [facility] CATH LAB;  Service: Cardiology;;    CORONARY BYPASS GRAFT ANGIOGRAPHY  3/2/2023    Procedure: Bypass graft study;  Surgeon: Devon Garnica MD;  Location: [facility] CATH LAB;  Service: Cardiology;;    ECHOCARDIOGRAM,TRANSESOPHAGEAL N/A 4/14/2023    Procedure: Transesophageal echo (KIRSTEN) intra-procedure log documentation;  Surgeon: [facility] Diagnostic Provider;  Location: [facility] EP LAB;  Service: Cardiology;  Laterality: N/A;    ENDOSCOPIC NASAL CAUTERIZATION Bilateral 11/3/2023    Procedure: CAUTERIZATION, NOSE, ENDOSCOPIC;  Surgeon: Jono Russell MD;  [facility];  Service: ENT;  Laterality: Bilateral;    ENDOSCOPIC NASAL CAUTERIZATION N/A 12/18/2023    Procedure: CAUTERIZATION, NOSE, ENDOSCOPIC;  Surgeon: Jono Russell MD;  [facility];  Service: ENT;  Laterality: N/A;    EYE SURGERY      FESS, WITH IMAGING GUIDANCE Left 2/28/2024    Procedure: FESS, WITH IMAGING GUIDANCE;  Surgeon: Jono Russell MD;  [facility];  Service: ENT;  Laterality: Left;  Scan loaded ENT [employer]. CL    FUNCTIONAL ENDOSCOPIC SINUS SURGERY (FESS) Bilateral 6/14/2023    Procedure: FESS (FUNCTIONAL ENDOSCOPIC SINUS SURGERY);  Surgeon: Jono Russell MD;  [facility];  Service: ENT;  Laterality: Bilateral;    HERNIA REPAIR      INTRAOCULAR PROSTHESES INSERTION Left  11/13/2022    Procedure: INSERTION, IOL PROSTHESIS;  Surgeon: Alia Mckeon MD;  Location: [facility] OR 1ST FLR;  Service: Ophthalmology;  Laterality: Left;    INTRAOCULAR PROSTHESES INSERTION Right 12/04/2022    Procedure: INSERTION, IOL PROSTHESIS;  Surgeon: Alia Mckeon MD;  Location: [facility] OR [facility] FLR;  Service: Ophthalmology;  Laterality: Right;    LIGATION, ARTERY, ETHMOIDAL Left 2/28/2024    Procedure: LIGATION, ARTERY, ETHMOIDAL;  Surgeon: Jono Russell MD;  Location: [facility] OR 2ND FLR;  Service: ENT;  Laterality: Left;    LIGATION, ARTERY, SPHENOPALATINE, ENDOSCOPIC Bilateral 6/14/2023    Procedure: LIGATION, ARTERY, SPHENOPALATINE, ENDOSCOPIC;  Surgeon: Jono Russell MD;  Location: [facility] OR 2ND FLR;  Service: ENT;  Laterality: Bilateral;    PHACOEMULSIFICATION OF CATARACT Left 11/13/2022    Procedure: PHACOEMULSIFICATION, CATARACT;  Surgeon: Alia Mckeon MD;  [facility];  Service: Ophthalmology;  Laterality: Left;    PHACOEMULSIFICATION OF CATARACT Right 12/04/2022    Procedure: PHACOEMULSIFICATION, CATARACT;  Surgeon: Alia Mckeon MD;  [facility];  Service: Ophthalmology;  Laterality: Right;    SPINE SURGERY      TRANSESOPHAGEAL ECHOCARDIOGRAPHY N/A 3/22/2023    Procedure: ECHOCARDIOGRAM, TRANSESOPHAGEAL;  Surgeon: [facility] Diagnostic Provider;  Location: [facility] EP LAB;  Service: Anesthesiology;  Laterality: N/A;    TRANSESOPHAGEAL ECHOCARDIOGRAPHY N/A 4/11/2023    Procedure: ECHOCARDIOGRAM, TRANSESOPHAGEAL;  Surgeon: [facility] Diagnostic Provider;  Location: [facility] EP LAB;  Service: Anesthesiology;  Laterality: N/A;    VASECTOMY         Social History     Socioeconomic History    Marital status:      Spouse name: Ameena    Number of children: 3   Occupational History    Occupation: retired   Tobacco Use    Smoking status: Former     Current packs/day: 0.00     Average packs/day: 1 pack/day for 20.0 years (20.0 ttl pk-yrs)     Types: Cigarettes     Start date:  1960     Quit date: 1980     Years since quittin.9     Passive exposure: Never    Smokeless tobacco: Never   Substance and Sexual Activity    Alcohol use: No    Drug use: No    Sexual activity: Not Currently     Partners: Female     Social Determinants of Health     Financial Resource Strain: Low Risk  (2024)    Overall Financial Resource Strain (CARDIA)     Difficulty of Paying Living Expenses: Not hard at all   Food Insecurity: No Food Insecurity (2024)    Hunger Vital Sign     Worried About Running Out of Food in the Last Year: Never true     Ran Out of Food in the Last Year: Never true   Transportation Needs: No Transportation Needs (2024)    PRAPARE - Transportation     Lack of Transportation (Medical): No     Lack of Transportation (Non-Medical): No   Physical Activity: Inactive (2024)    Exercise Vital Sign     Days of Exercise per Week: 0 days     Minutes of Exercise per Session: 0 min   Stress: No Stress Concern Present (2024)    [employer] [place] [employer] - Occupational Stress Questionnaire     Feeling of Stress : Not at all   Housing Stability: Low Risk  (2024)    Housing Stability Vital Sign     Unable to Pay for Housing in the Last Year: No     Homeless in the Last Year: No       OBJECTIVE:     Vital Signs Range (Last 24H):  Temp:  [36.4 °C (97.5 °F)-36.7 °C (98 °F)]   Pulse:  [60-72]   Resp:  [15-20]   BP: (128-165)/(61-74)   SpO2:  [99 %-100 %]       Significant Labs:  Lab Results   Component Value Date    WBC 7.22 2024    HGB 6.4 (L) 2024    HCT 21.5 (L) 2024     2024    CHOL 96 (L) 2024    TRIG 144 2024    HDL 31 (L) 2024    ALT 14 2024    AST 16 2024     2024    K 4.3 2024     2024    CREATININE 2.1 (H) 2024    BUN 48 (H) 2024    CO2 21 (L) 2024    TSH 3.705 2022    PSA 0.35 2012    INR 2.8 (H) 2024    HGBA1C 5.3  05/30/2024       Diagnostic Studies: No relevant studies.    EKG:   Results for orders placed or performed during the hospital encounter of 05/29/24   EKG 12-lead    Collection Time: 05/31/24  9:14 AM   Result Value Ref Range    QRS Duration 170 ms    [facility] QTC Calculation 495 ms    Narrative    Test Reason : R07.9,    Vent. Rate : 074 BPM     Atrial Rate : 000 BPM     P-R Int : 000 ms          QRS Dur : 170 ms      QT Int : 446 ms       P-R-T Axes : 000 040 161 degrees     QTc Int : 495 ms    Atrial fibrillation  Left bundle branch block  Abnormal ECG  When compared with ECG of 29-MAY-2024 18:52,  No significant change was found  Confirmed by Jeremias APARICIO MD ([id number]) on 5/31/2024 10:29:15 AM    Referred By: [email or web address]   SELF           Confirmed By:Jeremias APARICIO MD       2D ECHO:  TTE:  Results for orders placed or performed during the hospital encounter of 05/29/24   Echo   Result Value Ref Range    RA Width 4.96 cm    LA volume (mod) 144.20 cm3    Left Atrium Major Axis 7.26 cm    Left Atrium Minor Axis 7.31 cm    RA Major Axis 6.30 cm    LV Diastolic Volume 111.77 mL    LV Systolic Volume 54.97 mL    PV Peak D Ashkan 1.04 m/s    PV Peak S Ashkan 0.24 m/s    MV stenosis pressure 1/2 time 138.38 ms    MV VTI 16.87 cm    TR Max Ashkan 3.5 m/s    MV Peak E Ashkan 1.62 m/s    MV peak gradient 2 mmHg    Mr max ashkan 0.05 m/s    Ao VTI 66.81 cm    Ao peak ashkan 3.02 m/s    LVOT peak VTI 20.50 cm    LVOT peak ashkan 0.80 m/s    LVOT diameter 1.90 cm    IVRT 45.67 msec    E wave deceleration time 477.16 msec    MV mean gradient 0 mmHg    AV mean gradient 19 mmHg    TAPSE 1.87 cm    RVDD 4.48 cm    LA size 5.74 cm    Ascending aorta 3.3 cm    STJ 2.53 cm    Sinus 2.73 cm    LVIDs 3.61 2.1 - 4.0 cm    Posterior Wall 1.41 (A) 0.6 - 1.1 cm    IVS 0.99 0.6 - 1.1 cm    LVIDd 4.88 3.5 - 6.0 cm    TDI LATERAL 0.08 m/s    LA WIDTH 5.57 cm    TDI SEPTAL 0.08 m/s    LV LATERAL E/E' RATIO 20.25 m/s    LV SEPTAL E/E' RATIO 20.25 m/s    RV/LV Ratio 0.92 cm    FS  26 (A) 28 - 44 %    LA volume 197.98 cm3    LV mass 224.92 g    ZLVIDD -0.59     ZLVIDS 0.97     Left Ventricle Relative Wall Thickness 0.58 cm    AV valve area 0.87 cm²    AV Velocity Ratio 0.26     AV index (prosthetic) 0.31     MV valve area p 1/2 method 1.59 cm2    MV valve area by continuity eq 3.44 cm2    Mean e' 0.08 m/s    Pulm vein S/D ratio 0.23     LVOT area 2.8 cm2    LVOT stroke volume 58.09 cm3    AV peak gradient 36 mmHg    E/E' ratio 20.25 m/s    LV Systolic Volume Index 29.6 mL/m2    LV Diastolic Volume Index 60.09 mL/m2    LA Volume Index 106.4 mL/m2    LV Mass Index 121 g/m2    Triscuspid Valve Regurgitation Peak Gradient 49 mmHg    LA Volume Index (Mod) 77.5 mL/m2    MATIAS by Velocity Ratio 0.75 cm²    BSA 1.89 m2    TV resting pulmonary artery pressure 57 mmHg    RV TB RVSP 12 mmHg    Est. RA pres 8 mmHg    Almeida's Biplane MOD Ejection Fraction 51 %    Narrative      Left Ventricle: The left ventricle is normal in size. Normal wall   thickness. There is concentric hypertrophy. Septal motion is consistent   with post-operative status. There is low normal systolic function with a   visually estimated ejection fraction of 50 - 55%. Biplane (2D) method of   discs ejection fraction is 51%.    Right Ventricle: Mild right ventricular enlargement. Wall thickness is   normal. Systolic function is normal.    Left Atrium: Left atrium is severely dilated.    Right Atrium: Right atrium is severely dilated.    Aortic Valve: There is a mechanical valve in the aortic position.   Aortic valve area by VTI is 0.87 cm². Aortic valve peak velocity is 3.02   m/s. Mean gradient is 19 mmHg. The dimensionless index is 0.31. There is   trace aortic regurgitation.    Mitral Valve: There is mild bileaflet sclerosis. There is moderate   mitral annular calcification present. There is moderate to severe   regurgitation with a centrally directed jet.    Tricuspid Valve: There is moderate regurgitation.    Pulmonary Artery:  There is moderate pulmonary hypertension. The   estimated pulmonary artery systolic pressure is 57 mmHg.    IVC/SVC: Intermediate venous pressure at 8 mmHg.         KIRSTEN:  Results for orders placed or performed during the hospital encounter of 04/11/23   Transesophageal echo (KIRSTEN)   Result Value Ref Range    BSA 1.84 m2    EF 60 %    Narrative    · The left ventricle is normal in size with normal systolic function.The   estimated ejection fraction is 60%.  · Normal right ventricular size with normal right ventricular systolic   function.  · Moderate mitral regurgitation.  · There is a mechanical aortic valve present. There is no aortic   insufficiency present.  · Plaque present in the transverse aorta.          ASSESSMENT/PLAN:          Pre-op Assessment    I have reviewed the Patient Summary Reports.     I have reviewed the Nursing Notes. I have reviewed the NPO Status.   I have reviewed the Medications.     Review of Systems  Anesthesia Hx:  No problems with previous Anesthesia   History of prior surgery of interest to airway management or planning: heart surgery. Previous anesthesia: General        Denies Family Hx of Anesthesia complications.    Denies Personal Hx of Anesthesia complications.                    Social:  Former Smoker       Hematology/Oncology:       -- Anemia:                                  Cardiovascular:     Hypertension Valvular problems/Murmurs, MR  CAD   CABG/stent Dysrhythmias atrial fibrillation   Denies CHF.    hyperlipidemia                             Pulmonary:    Denies COPD.  Denies Asthma.                    Renal/:  Chronic Renal Disease, CKD                Hepatic/GI:      Denies GERD.             Musculoskeletal:  Denies Arthritis.               Neurological:    Denies CVA.    Denies Seizures.                                Endocrine:  Diabetes, type 2           Psych:  Denies Psychiatric History.                  Physical Exam  General: Cooperative and  Alert    Airway:  Mallampati: II / II  Mouth Opening: Normal  TM Distance: Normal  Tongue: Normal  Neck ROM: Normal ROM    Dental:  Intact        Anesthesia Plan  Type of Anesthesia, risks & benefits discussed:    Anesthesia Type: Gen ETT, Gen Natural Airway  Intra-op Monitoring Plan: Standard ASA Monitors  Post Op Pain Control Plan: multimodal analgesia and IV/PO Opioids PRN  Induction:  IV and rapid sequence  Airway Plan: Direct, Post-Induction  Informed Consent: Informed consent signed with the Patient and all parties understand the risks and agree with anesthesia plan.  All questions answered.   ASA Score: 4  Day of Surgery Review of History & Physical: H&P Update referred to the surgeon/provider.    Ready For Surgery From Anesthesia Perspective.     .

## 2024-08-05 NOTE — SUBJECTIVE & OBJECTIVE
Past Medical History:   Diagnosis Date    Anemia of chronic renal failure, stage 3 (moderate) 05/27/2015    Anticoagulant long-term use     Atherosclerosis of coronary artery bypass graft of native heart without angina pectoris 09/11/2012    3-27-18 [facility] Two vessel coronary artery disease.   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    Bilateral carotid artery disease 02/09/2017    Bleeding from the nose     Bleeding nose 03/21/2018    Cancer     Cataract     CHF (congestive heart failure)     CKD (chronic kidney disease) stage 3, GFR 30-59 ml/min 05/27/2015    Claudication of left lower extremity 09/17/2014    Colon polyp     Encounter for blood transfusion     Gastroesophageal reflux disease without esophagitis 03/19/2018    Gastrointestinal hemorrhage associated with intestinal diverticulosis 04/01/2018    Glaucoma     H/O mechanical aortic valve replacement 09/17/2014    History of gout 09/26/2012    Hyperparathyroidism due to renal insufficiency 07/27/2015    Internal hemorrhoid 04/03/2018    Long term current use of anticoagulant therapy 09/26/2012    Mechanical heart valve present     Metabolic acidosis with normal anion gap and bicarbonate losses 03/20/2018    Mixed hyperlipidemia 09/26/2012    NSTEMI (non-ST elevated myocardial infarction) 03/21/2018    Obesity, diabetes, and hypertension syndrome 02/23/2016    Paroxysmal atrial fibrillation 02/06/2023    PVD (peripheral vascular disease) 09/11/2012    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       Past Surgical History:   Procedure Laterality Date    BACK SURGERY      CARDIAC CATHETERIZATION      CARDIAC VALVE  REPLACEMENT      CARDIAC VALVE SURGERY      CARPAL TUNNEL RELEASE Right 05/19/2020    Procedure: RELEASE, CARPAL TUNNEL;  Surgeon: Rupesh Norris Jr., MD;  Location: [facility];  Service: Plastics;  Laterality: Right;    CATARACT EXTRACTION Left 11/13/2022        COLON SURGERY      COLONOSCOPY N/A 03/31/2017    Procedure: COLONOSCOPY;  Surgeon: Bruno Raymond MD;  Location: [facility] ENDO (4TH FLR);  Service: Endoscopy;  Laterality: N/A;  Patient's wife requesting date.    COLONOSCOPY N/A 04/03/2018    Procedure: COLONOSCOPY;  Surgeon: Bonifacio Pelletier MD;  Location: [facility] ENDO (2ND FLR);  Service: Endoscopy;  Laterality: N/A;    COLONOSCOPY N/A 08/13/2018    Procedure: COLONOSCOPY;  Surgeon: Kam Barba MD;  Location: [facility] ENDO (2ND FLR);  Service: Endoscopy;  Laterality: N/A;  2nd floor: PA pressure 49; hx of moderate-severe valve disease     per Coumadin clinic-Patient can hold 5 days with lovenox bridge       ok to schedule per Katarina    CONTROL OF EPISTAXIS, POSTERIOR, USING NASAL PACKING OR CAUTERIZATION Bilateral 6/18/2024    Procedure: CONTROL OF EPISTAXIS, POSTERIOR, USING NASAL PACKING OR CAUTERIZATION;  Surgeon: Jono Russell MD;  Location: [facility] 2ND FLR;  Service: ENT;  Laterality: Bilateral;    CORONARY ANGIOGRAPHY N/A 10/04/2021    Procedure: Left heart cath +/- peripheral angiogram;  Surgeon: Jose Ruiz MD;  Location: [facility] CATH LAB;  Service: Cardiology;  Laterality: N/A;    CORONARY ANGIOGRAPHY N/A 3/2/2023    Procedure: Angiogram, Coronary;  Surgeon: Devon Garnica MD;  Location: [facility] CATH LAB;  Service: Cardiology;  Laterality: N/A;    CORONARY ANGIOPLASTY      CORONARY ARTERY BYPASS GRAFT      CORONARY BYPASS GRAFT ANGIOGRAPHY  10/04/2021    Procedure: Bypass graft study;  Surgeon: Jose Ruiz MD;  Location: [facility] CATH LAB;  Service: Cardiology;;    CORONARY BYPASS GRAFT ANGIOGRAPHY  3/2/2023    Procedure: Bypass graft study;  Surgeon: Devon Garnica MD;  Location:  [facility] CATH LAB;  Service: Cardiology;;    ECHOCARDIOGRAM,TRANSESOPHAGEAL N/A 4/14/2023    Procedure: Transesophageal echo (KIRSTEN) intra-procedure log documentation;  Surgeon: Zenia Diagnostic Provider;  Location: [facility] EP LAB;  Service: Cardiology;  Laterality: N/A;    ENDOSCOPIC NASAL CAUTERIZATION Bilateral 11/3/2023    Procedure: CAUTERIZATION, NOSE, ENDOSCOPIC;  Surgeon: Jono Russell MD;  Location: [facility] OR 2ND FLR;  Service: ENT;  Laterality: Bilateral;    ENDOSCOPIC NASAL CAUTERIZATION N/A 12/18/2023    Procedure: CAUTERIZATION, NOSE, ENDOSCOPIC;  Surgeon: Jono Russell MD;  Location: [facility] OR 2ND FLR;  Service: ENT;  Laterality: N/A;    EYE SURGERY      FESS, WITH IMAGING GUIDANCE Left 2/28/2024    Procedure: FESS, WITH IMAGING GUIDANCE;  Surgeon: Jono Russell MD;  Location: [facility] OR 2ND FLR;  Service: ENT;  Laterality: Left;  Scan loaded ENT [employer]. CL    FUNCTIONAL ENDOSCOPIC SINUS SURGERY (FESS) Bilateral 6/14/2023    Procedure: FESS (FUNCTIONAL ENDOSCOPIC SINUS SURGERY);  Surgeon: Jono Russell MD;  Location: [facility] OR 2ND FLR;  Service: ENT;  Laterality: Bilateral;    HERNIA REPAIR      INTRAOCULAR PROSTHESES INSERTION Left 11/13/2022    Procedure: INSERTION, IOL PROSTHESIS;  Surgeon: Alia Mckeon MD;  Location: [facility] OR 1ST FLR;  Service: Ophthalmology;  Laterality: Left;    INTRAOCULAR PROSTHESES INSERTION Right 12/04/2022    Procedure: INSERTION, IOL PROSTHESIS;  Surgeon: Alia Mckeon MD;  Location: [facility] OR 1ST FLR;  Service: Ophthalmology;  Laterality: Right;    LIGATION, ARTERY, ETHMOIDAL Left 2/28/2024    Procedure: LIGATION, ARTERY, ETHMOIDAL;  Surgeon: Jono Russell MD;  Location: [facility] OR 2ND FLR;  Service: ENT;  Laterality: Left;    LIGATION, ARTERY, SPHENOPALATINE, ENDOSCOPIC Bilateral 6/14/2023    Procedure: LIGATION, ARTERY, SPHENOPALATINE, ENDOSCOPIC;  Surgeon: Jono Russell MD;  Location: [facility] OR 2ND FLR;  Service: ENT;  Laterality: Bilateral;     PHACOEMULSIFICATION OF CATARACT Left 11/13/2022    Procedure: PHACOEMULSIFICATION, CATARACT;  Surgeon: Alia Mckeon MD;  Location: [facility] OR [address] [place] [address];  Service: Ophthalmology;  Laterality: Left;    PHACOEMULSIFICATION OF CATARACT Right 12/04/2022    Procedure: PHACOEMULSIFICATION, CATARACT;  Surgeon: Alia Mckeon MD;  [facility];  Service: Ophthalmology;  Laterality: Right;    SPINE SURGERY      TRANSESOPHAGEAL ECHOCARDIOGRAPHY N/A 3/22/2023    Procedure: ECHOCARDIOGRAM, TRANSESOPHAGEAL;  Surgeon: [facility] Diagnostic Provider;  Location: [facility] EP LAB;  Service: Anesthesiology;  Laterality: N/A;    TRANSESOPHAGEAL ECHOCARDIOGRAPHY N/A 4/11/2023    Procedure: ECHOCARDIOGRAM, TRANSESOPHAGEAL;  Surgeon: [facility] Diagnostic Provider;  Location: [facility] EP LAB;  Service: Anesthesiology;  Laterality: N/A;    VASECTOMY         Review of patient's allergies indicates:   Allergen Reactions    Lisinopril     Losartan      Intolerance- elevates potassium level       Current Facility-Administered Medications on File Prior to Encounter   Medication    ceFAZolin 2 g in dextrose 5 % in water (D5W) 50 mL IVPB (MB+)    HYDROmorphone injection 0.2 mg    sodium chloride 0.9% flush 10 mL     Current Outpatient Medications on File Prior to Encounter   Medication Sig    acetaminophen (TYLENOL) 500 MG tablet Take 500 mg by mouth daily as needed for Pain.    allopurinoL (ZYLOPRIM) 100 MG tablet Take 1 tablet (100 mg total) by mouth once daily.    bumetanide (BUMEX) 1 MG tablet Take 2 tablets (2 mg total) by mouth every other day.    ferrous gluconate (FERGON) 324 MG tablet TAKE 1 TABLET EVERY DAY WITH BREAKFAST    isosorbide mononitrate (IMDUR) 60 MG 24 hr tablet Take 1 tablet (60 mg total) by mouth every evening.    metoprolol succinate (TOPROL-XL) 25 MG 24 hr tablet TAKE 1 TABLET ONE TIME DAILY    omega-3 fatty acids/fish oil (FISH OIL-OMEGA-3 FATTY ACIDS) 300-1,000 mg capsule Take 1 capsule by mouth once daily.    rosuvastatin  (CRESTOR) 40 MG Tab TAKE 1 TABLET EVERY EVENING.    sodium chloride (OCEAN) 0.65 % nasal spray 1 spray by Nasal route as needed for Congestion.    sodium chloride (SALINE NASAL) 0.65 % nasal spray 2 sprays by Nasal route 3 (three) times daily.    traZODone (DESYREL) 50 MG tablet Take 1 tablet (50 mg total) by mouth every evening.    warfarin (COUMADIN) 5 MG tablet TAKE 1/2 TABLET (2.5MG) SATURDAY, THEN TAKE 1 TABLET (5MG) ALL OTHER DAYS OR AS INSTRUCTED BY COUMADIN CLINIC (Patient taking differently: Take 5 mg by mouth every evening.)     Family History       Problem Relation (Age of Onset)    Alcohol abuse Father    Diabetes Brother    Heart disease Mother, Father, Brother, Sister    Heart failure Mother, Father, Brother    No Known Problems Sister, Maternal Grandmother, Maternal Grandfather, Paternal Grandmother, Paternal Grandfather, Maternal Aunt, Maternal Uncle, Paternal Aunt, Paternal Uncle          Tobacco Use    Smoking status: Former     Current packs/day: 0.00     Average packs/day: 1 pack/day for 20.0 years (20.0 ttl pk-yrs)     Types: Cigarettes     Start date: 1960     Quit date: 1980     Years since quittin.9     Passive exposure: Never    Smokeless tobacco: Never   Substance and Sexual Activity    Alcohol use: No    Drug use: No    Sexual activity: Not Currently     Partners: Female     Review of Systems   Constitutional:  Negative for activity change, chills and fever.   HENT:  Positive for nosebleeds. Negative for trouble swallowing.    Eyes:  Negative for photophobia and visual disturbance.   Respiratory:  Negative for chest tightness, shortness of breath and wheezing.    Cardiovascular:  Negative for chest pain, palpitations and leg swelling.   Gastrointestinal:  Negative for abdominal pain, constipation, diarrhea, nausea and vomiting.   Genitourinary:  Negative for dysuria, frequency, hematuria and urgency.   Musculoskeletal:  Negative for arthralgias, back pain and gait problem.    Skin:  Negative for color change and rash.   Neurological:  Negative for dizziness, syncope, weakness, light-headedness, numbness and headaches.   Psychiatric/Behavioral:  Negative for agitation and confusion. The patient is not nervous/anxious.      Objective:     Vital Signs (Most Recent):  Temp: 97.5 °F (36.4 °C) (08/05/24 0903)  Pulse: 66 (08/05/24 0903)  Resp: 15 (08/05/24 0903)  BP: (!) 151/70 (08/05/24 0903)  SpO2: 100 % (08/05/24 0903) Vital Signs (24h Range):  Temp:  [97.5 °F (36.4 °C)-98 °F (36.7 °C)] 97.5 °F (36.4 °C)  Pulse:  [60-72] 66  Resp:  [15-20] 15  SpO2:  [99 %-100 %] 100 %  BP: (128-165)/(61-74) 151/70     Weight: 69.3 kg (152 lb 12.5 oz)  Body mass index is 25.42 kg/m².     Physical Exam  Vitals and nursing note reviewed.   Constitutional:       General: He is not in acute distress.     Appearance: He is well-developed.   HENT:      Head: Normocephalic and atraumatic.      Nose:      Right Nostril: Epistaxis present.      Left Nostril: Epistaxis present.      Comments: Rhinorockets in place, bilateral     Mouth/Throat:      Pharynx: No oropharyngeal exudate.   Eyes:      Conjunctiva/sclera: Conjunctivae normal.      Pupils: Pupils are equal, round, and reactive to light.   Cardiovascular:      Rate and Rhythm: Normal rate and regular rhythm.      Heart sounds: Normal heart sounds.   Pulmonary:      Effort: Pulmonary effort is normal. No respiratory distress.      Breath sounds: Normal breath sounds. No wheezing.   Abdominal:      General: Bowel sounds are normal. There is no distension.      Palpations: Abdomen is soft.      Tenderness: There is no abdominal tenderness.   Musculoskeletal:         General: No tenderness. Normal range of motion.      Cervical back: Normal range of motion and neck supple.   Lymphadenopathy:      Cervical: No cervical adenopathy.   Skin:     General: Skin is warm and dry.      Capillary Refill: Capillary refill takes less than 2 seconds.      Findings: No rash.    Neurological:      Mental Status: He is alert and oriented to person, place, and time.      Cranial Nerves: No cranial nerve deficit.      Sensory: No sensory deficit.      Coordination: Coordination normal.   Psychiatric:         Behavior: Behavior normal.         Thought Content: Thought content normal.         Judgment: Judgment normal.              CRANIAL NERVES     CN III, IV, VI   Pupils are equal, round, and reactive to light.       Significant Labs: All pertinent labs within the past 24 hours have been reviewed.    Significant Imaging: I have reviewed all pertinent imaging results/findings within the past 24 hours.

## 2024-08-05 NOTE — ASSESSMENT & PLAN NOTE
Acute blood loss anemia    Patient presents with recurrent epistaxis, follows closely outpatient with ENT.   - Hgb 6.4, transfused 1 unit  - trend CBC  - start Afrin q8h bilateral nares + q4h saline spray to packing  - start Keflex for staph coverage while packing in place  - ENT consulted  - planning for IR embolization tomorrow

## 2024-08-05 NOTE — ED TRIAGE NOTES
"Dariel Urbina, a 83 y.o. male presents to the ED w/ complaint of epistaxis. Pt reports been dealing with this on and off for last 2 weeks. This episode started last night and has not stopped. Pt arrives with nose clamp from home in place. Pt reports once clamp is released, "giant clots come out". Pt does take coumadin. Pt does reports having to have transfusion of 3 units last time in hospital for this.     Triage note:  Chief Complaint   Patient presents with    Epistaxis     Started at 10pm last night; has nose clamp on from home; is vomiting clots; d/'d a mos ago for same issue; +coumadin     Review of patient's allergies indicates:   Allergen Reactions    Lisinopril     Losartan      Intolerance- elevates potassium level     Past Medical History:   Diagnosis Date    Anemia of chronic renal failure, stage 3 (moderate) 05/27/2015    Anticoagulant long-term use     Atherosclerosis of coronary artery bypass graft of native heart without angina pectoris 09/11/2012    3-27-18 [facility] Two vessel coronary artery disease.   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    Bilateral carotid artery disease 02/09/2017    Bleeding from the nose     Bleeding nose 03/21/2018    Cancer     Cataract     CHF (congestive heart failure)     CKD (chronic kidney disease) stage 3, GFR 30-59 ml/min 05/27/2015    Claudication of left lower extremity 09/17/2014    Colon polyp     Encounter for blood transfusion     Gastroesophageal reflux disease without esophagitis 03/19/2018    Gastrointestinal hemorrhage associated with intestinal diverticulosis 04/01/2018    Glaucoma     H/O mechanical aortic valve replacement 09/17/2014    History of gout 09/26/2012    Hyperparathyroidism due to renal insufficiency 07/27/2015    Internal hemorrhoid 04/03/2018    Long term current use of anticoagulant therapy 09/26/2012    Mechanical heart valve present     Metabolic acidosis with normal anion gap and bicarbonate losses " 03/20/2018    Mixed hyperlipidemia 09/26/2012    NSTEMI (non-ST elevated myocardial infarction) 03/21/2018    Obesity, diabetes, and hypertension syndrome 02/23/2016    Paroxysmal atrial fibrillation 02/06/2023    PVD (peripheral vascular disease) 09/11/2012    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

## 2024-08-05 NOTE — SUBJECTIVE & OBJECTIVE
Medications:  Continuous Infusions:  Scheduled Meds:   oxymetazoline  2 spray Topical (Top) Once     PRN Meds:  Current Facility-Administered Medications:     0.9%  NaCl infusion (for blood administration), , Intravenous, Q24H PRN     Current Facility-Administered Medications on File Prior to Encounter   Medication    ceFAZolin 2 g in dextrose 5 % in water (D5W) 50 mL IVPB (MB+)    HYDROmorphone injection 0.2 mg    sodium chloride 0.9% flush 10 mL     Current Outpatient Medications on File Prior to Encounter   Medication Sig    acetaminophen (TYLENOL) 500 MG tablet Take 500 mg by mouth daily as needed for Pain.    allopurinoL (ZYLOPRIM) 100 MG tablet Take 1 tablet (100 mg total) by mouth once daily.    bumetanide (BUMEX) 1 MG tablet Take 2 tablets (2 mg total) by mouth every other day.    ferrous gluconate (FERGON) 324 MG tablet TAKE 1 TABLET EVERY DAY WITH BREAKFAST    isosorbide mononitrate (IMDUR) 60 MG 24 hr tablet Take 1 tablet (60 mg total) by mouth every evening.    metoprolol succinate (TOPROL-XL) 25 MG 24 hr tablet TAKE 1 TABLET ONE TIME DAILY    omega-3 fatty acids/fish oil (FISH OIL-OMEGA-3 FATTY ACIDS) 300-1,000 mg capsule Take 1 capsule by mouth once daily.    rosuvastatin (CRESTOR) 40 MG Tab TAKE 1 TABLET EVERY EVENING.    sodium chloride (OCEAN) 0.65 % nasal spray 1 spray by Nasal route as needed for Congestion.    sodium chloride (SALINE NASAL) 0.65 % nasal spray 2 sprays by Nasal route 3 (three) times daily.    traZODone (DESYREL) 50 MG tablet Take 1 tablet (50 mg total) by mouth every evening.    warfarin (COUMADIN) 5 MG tablet TAKE 1/2 TABLET (2.5MG) SATURDAY, THEN TAKE 1 TABLET (5MG) ALL OTHER DAYS OR AS INSTRUCTED BY COUMADIN CLINIC (Patient taking differently: Take 5 mg by mouth every evening.)       Review of patient's allergies indicates:   Allergen Reactions    Lisinopril     Losartan      Intolerance- elevates potassium level       Past Medical History:   Diagnosis Date    Anemia of chronic  renal failure, stage 3 (moderate) 05/27/2015    Anticoagulant long-term use     Atherosclerosis of coronary artery bypass graft of native heart without angina pectoris 09/11/2012    3-27-18 [facility] Two vessel coronary artery disease.   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    Bilateral carotid artery disease 02/09/2017    Bleeding from the nose     Bleeding nose 03/21/2018    Cancer     Cataract     CHF (congestive heart failure)     CKD (chronic kidney disease) stage 3, GFR 30-59 ml/min 05/27/2015    Claudication of left lower extremity 09/17/2014    Colon polyp     Encounter for blood transfusion     Gastroesophageal reflux disease without esophagitis 03/19/2018    Gastrointestinal hemorrhage associated with intestinal diverticulosis 04/01/2018    Glaucoma     H/O mechanical aortic valve replacement 09/17/2014    History of gout 09/26/2012    Hyperparathyroidism due to renal insufficiency 07/27/2015    Internal hemorrhoid 04/03/2018    Long term current use of anticoagulant therapy 09/26/2012    Mechanical heart valve present     Metabolic acidosis with normal anion gap and bicarbonate losses 03/20/2018    Mixed hyperlipidemia 09/26/2012    NSTEMI (non-ST elevated myocardial infarction) 03/21/2018    Obesity, diabetes, and hypertension syndrome 02/23/2016    Paroxysmal atrial fibrillation 02/06/2023    PVD (peripheral vascular disease) 09/11/2012    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     Past Surgical History:   Procedure Laterality Date    BACK SURGERY      CARDIAC CATHETERIZATION      CARDIAC VALVE REPLACEMENT      CARDIAC VALVE SURGERY      CARPAL TUNNEL RELEASE  Right 05/19/2020    Procedure: RELEASE, CARPAL TUNNEL;  Surgeon: Rupesh Norris Jr., MD;  Location: [facility];  Service: Plastics;  Laterality: Right;    CATARACT EXTRACTION Left 11/13/2022        COLON SURGERY      COLONOSCOPY N/A 03/31/2017    Procedure: COLONOSCOPY;  Surgeon: Bruno Raymond MD;  Location: [facility] ENDO (4TH FLR);  Service: Endoscopy;  Laterality: N/A;  Patient's wife requesting date.    COLONOSCOPY N/A 04/03/2018    Procedure: COLONOSCOPY;  Surgeon: Bonifacio Pelletier MD;  Location: [facility] ENDO (2ND FLR);  Service: Endoscopy;  Laterality: N/A;    COLONOSCOPY N/A 08/13/2018    Procedure: COLONOSCOPY;  Surgeon: Kam Barba MD;  Location: [facility] ENDO (2ND FLR);  Service: Endoscopy;  Laterality: N/A;  2nd floor: PA pressure 49; hx of moderate-severe valve disease     per Coumadin clinic-Patient can hold 5 days with lovenox bridge       ok to schedule per Katarina    CONTROL OF EPISTAXIS, POSTERIOR, USING NASAL PACKING OR CAUTERIZATION Bilateral 6/18/2024    Procedure: CONTROL OF EPISTAXIS, POSTERIOR, USING NASAL PACKING OR CAUTERIZATION;  Surgeon: Jono Russell MD;  Location: [facility] OR 2ND FLR;  Service: ENT;  Laterality: Bilateral;    CORONARY ANGIOGRAPHY N/A 10/04/2021    Procedure: Left heart cath +/- peripheral angiogram;  Surgeon: Jose Ruiz MD;  Location: [facility] CATH LAB;  Service: Cardiology;  Laterality: N/A;    CORONARY ANGIOGRAPHY N/A 3/2/2023    Procedure: Angiogram, Coronary;  Surgeon: Devon Garnica MD;  Location: [facility] CATH LAB;  Service: Cardiology;  Laterality: N/A;    CORONARY ANGIOPLASTY      CORONARY ARTERY BYPASS GRAFT      CORONARY BYPASS GRAFT ANGIOGRAPHY  10/04/2021    Procedure: Bypass graft study;  Surgeon: Jose Ruiz MD;  Location: [facility] CATH LAB;  Service: Cardiology;;    CORONARY BYPASS GRAFT ANGIOGRAPHY  3/2/2023    Procedure: Bypass graft study;  Surgeon: Devon Garnica MD;  Location: [facility] CATH LAB;  Service: Cardiology;;     ECHOCARDIOGRAM,TRANSESOPHAGEAL N/A 4/14/2023    Procedure: Transesophageal echo (KIRSTEN) intra-procedure log documentation;  [facility];  Location: [facility] EP LAB;  Service: Cardiology;  Laterality: N/A;    ENDOSCOPIC NASAL CAUTERIZATION Bilateral 11/3/2023    Procedure: CAUTERIZATION, NOSE, ENDOSCOPIC;  Surgeon: Jono Russell MD;  Location: [facility] OR 2ND FLR;  Service: ENT;  Laterality: Bilateral;    ENDOSCOPIC NASAL CAUTERIZATION N/A 12/18/2023    Procedure: CAUTERIZATION, NOSE, ENDOSCOPIC;  Surgeon: Jono Russell MD;  Location: [facility] OR 2ND FLR;  Service: ENT;  Laterality: N/A;    EYE SURGERY      FESS, WITH IMAGING GUIDANCE Left 2/28/2024    Procedure: FESS, WITH IMAGING GUIDANCE;  Surgeon: Jono Russell MD;  Location: [facility] OR 2ND FLR;  Service: ENT;  Laterality: Left;  Scan loaded ENT [employer]. CL    FUNCTIONAL ENDOSCOPIC SINUS SURGERY (FESS) Bilateral 6/14/2023    Procedure: FESS (FUNCTIONAL ENDOSCOPIC SINUS SURGERY);  Surgeon: Jono Russell MD;  Location: [facility] OR 2ND FLR;  Service: ENT;  Laterality: Bilateral;    HERNIA REPAIR      INTRAOCULAR PROSTHESES INSERTION Left 11/13/2022    Procedure: INSERTION, IOL PROSTHESIS;  Surgeon: Alia Mckeon MD;  Location: [facility] OR 1ST FLR;  Service: Ophthalmology;  Laterality: Left;    INTRAOCULAR PROSTHESES INSERTION Right 12/04/2022    Procedure: INSERTION, IOL PROSTHESIS;  Surgeon: Alia Mckeon MD;  Location: [facility] OR 1ST FLR;  Service: Ophthalmology;  Laterality: Right;    LIGATION, ARTERY, ETHMOIDAL Left 2/28/2024    Procedure: LIGATION, ARTERY, ETHMOIDAL;  Surgeon: Jono Russell MD;  Location: [facility] OR 2ND FLR;  Service: ENT;  Laterality: Left;    LIGATION, ARTERY, SPHENOPALATINE, ENDOSCOPIC Bilateral 6/14/2023    Procedure: LIGATION, ARTERY, SPHENOPALATINE, ENDOSCOPIC;  Surgeon: Jono Russell MD;  Location: [facility] OR 2ND FLR;  Service: ENT;  Laterality: Bilateral;    PHACOEMULSIFICATION OF CATARACT Left 11/13/2022     Procedure: PHACOEMULSIFICATION, CATARACT;  Surgeon: Alia Mckeon MD;  Location: [facility] OR [address] [place] [address];  Service: Ophthalmology;  Laterality: Left;    PHACOEMULSIFICATION OF CATARACT Right 2022    Procedure: PHACOEMULSIFICATION, CATARACT;  Surgeon: Alia Mckeon MD;  Location: [facility] OR [address] [place] [address];  Service: Ophthalmology;  Laterality: Right;    SPINE SURGERY      TRANSESOPHAGEAL ECHOCARDIOGRAPHY N/A 3/22/2023    Procedure: ECHOCARDIOGRAM, TRANSESOPHAGEAL;  Surgeon: [facility] Diagnostic Provider;  Location: [facility] EP LAB;  Service: Anesthesiology;  Laterality: N/A;    TRANSESOPHAGEAL ECHOCARDIOGRAPHY N/A 2023    Procedure: ECHOCARDIOGRAM, TRANSESOPHAGEAL;  Surgeon: [facility] Diagnostic Provider;  Location: [facility] EP LAB;  Service: Anesthesiology;  Laterality: N/A;    VASECTOMY       Family History       Problem Relation (Age of Onset)    Alcohol abuse Father    Diabetes Brother    Heart disease Mother, Father, Brother, Sister    Heart failure Mother, Father, Brother    No Known Problems Sister, Maternal Grandmother, Maternal Grandfather, Paternal Grandmother, Paternal Grandfather, Maternal Aunt, Maternal Uncle, Paternal Aunt, Paternal Uncle          Tobacco Use    Smoking status: Former     Current packs/day: 0.00     Average packs/day: 1 pack/day for 20.0 years (20.0 ttl pk-yrs)     Types: Cigarettes     Start date: 1960     Quit date: 1980     Years since quittin.9     Passive exposure: Never    Smokeless tobacco: Never   Substance and Sexual Activity    Alcohol use: No    Drug use: No    Sexual activity: Not Currently     Partners: Female     Review of Systems   Constitutional:  Negative for chills, fever and unexpected weight change.   HENT:  Positive for nosebleeds and postnasal drip. Negative for congestion, drooling, facial swelling, sore throat, trouble swallowing and voice change.    Eyes:  Negative for visual disturbance.   Respiratory:  Negative for choking, shortness of breath and stridor.     Gastrointestinal:  Negative for nausea and vomiting.     Objective:     Vital Signs (Most Recent):  Temp: 98 °F (36.7 °C) (08/05/24 0755)  Pulse: 67 (08/05/24 0755)  Resp: 20 (08/05/24 0755)  BP: (!) 164/74 (08/05/24 0755)  SpO2: 100 % (08/05/24 0755) Vital Signs (24h Range):  Temp:  [97.6 °F (36.4 °C)-98 °F (36.7 °C)] 98 °F (36.7 °C)  Pulse:  [60-72] 67  Resp:  [17-20] 20  SpO2:  [99 %-100 %] 100 %  BP: (128-165)/(61-74) 164/74     Weight: 69.3 kg (152 lb 12.5 oz)  Body mass index is 25.42 kg/m².         Physical Exam  NAD  Awake and alert  Nose w/ left rhino rocket in place with balloon inflated - documented as anterior pack though unclear of length used or amt in balloon  Posterior OP with large clot extending from nasopharynx into hypopharynx - unable to readily suction out  Right nasal cavity with clot and oozing anteriorly though not profusely bleeding prior to disturbing clot  Normal WOB, no stridor or stertor on room air      Procedure: Control of epistaxis -- Nasal packing  After obtaining consent from the patient/family, anterior rhinoscopy performed. The left rhino rocket was left in place in light of patient history and overall stable appearance of bleeding from left. A large clot was removed from the right nasal cavity revealing known anterior septal perforation. Initially without bleeding though with some time with bright red blood around nasal packing on left through perforation. A merocel was trimmed minimally and placed in right nasal cavity sprayed with Afrin. At the conclusion of the procedure, no active bleeding around the nasal pack was seen.       Significant Labs:  CBC:   Recent Labs   Lab 08/05/24 0409   WBC 7.22   RBC 2.15*   HGB 6.4*   HCT 21.5*      *   MCH 29.8   MCHC 29.8*     Coagulation:   Recent Labs   Lab 08/05/24 0409   LABPROT 29.0*   INR 2.8*   APTT 35.2*       Significant Diagnostics:  None

## 2024-08-05 NOTE — CONSULTS
Interventional Neuroradiology Pre-procedure Note    Procedure: Diagnostic cerebral angiogram and intervention    History of Present Illness:  Dariel Urbina is a 83 y.o. male with h/o recurrent epistaxis s/p bilateral SP ligation followed by IR embolization on 09/18/23, bilateral facial and internal maxillary artery embolization on 11/03/2023, 12/18/23, left AEA ligation on 2/28/24, and bilateral endoscopic control/cauterization in OR on 6/18/24 who presents for recurrent epistaxis. Pt is on coumadin with therapeutic INR for mechanical heart valve. ENT has planned endoscopy on Thu and would like repeat angiogram with possible intervention.     ROS:   Hematological: no known coagulopathies  Respiratory: no shortness of breath  Cardiovascular: no chest pain  Gastrointestinal: no abdominal pain  Genito-Urinary: no dysuria  Musculoskeletal: negative  Neurological: no TIA or stroke symptoms     Scheduled Meds:    allopurinoL  100 mg Oral Daily    bumetanide  2 mg Oral Every other day    cephALEXin  500 mg Oral Q8H    [START ON 8/6/2024] ferrous gluconate  324 mg Oral Daily with breakfast    isosorbide mononitrate  60 mg Oral QHS    metoprolol succinate  25 mg Oral Daily    oxymetazoline  2 spray Each Nostril Q8H    sodium chloride  2 spray Each Nostril Q4H     Current Meds:   Current Facility-Administered Medications:     0.9%  NaCl infusion (for blood administration), , Intravenous, Q24H PRN, Lorne Angel Jr., PA-C    acetaminophen tablet 1,000 mg, 1,000 mg, Oral, Q8H PRN, Lorne Angel Jr., PA-DAVID    acetaminophen tablet 650 mg, 650 mg, Oral, Q4H PRN, Lorne Angel Jr., PA-C    albuterol-ipratropium 2.5 mg-0.5 mg/3 mL nebulizer solution 3 mL, 3 mL, Nebulization, Q4H PRN, Lorne Angel Jr., PA-DAVID    allopurinoL tablet 100 mg, 100 mg, Oral, Daily, Lorne Angel Jr., PA-C    aluminum-magnesium hydroxide-simethicone 200-200-20 mg/5 mL suspension 30 mL, 30 mL, Oral, QID PRN, Jeanne  Lorne GALLAGHER Jr., PA-C    bisacodyL suppository 10 mg, 10 mg, Rectal, Daily PRN, Lorne Angel Jr., PA-DAVID    bumetanide tablet 2 mg, 2 mg, Oral, Every other day, Lorne Angel Jr., PA-DAVID    cephALEXin capsule 500 mg, 500 mg, Oral, Q8H, Loren Angel Jr., TOSHIA    [START ON 8/6/2024] ferrous gluconate tablet 324 mg, 324 mg, Oral, Daily with breakfast, Lorne Angel Jr., PA-C    glucagon (human recombinant) injection 1 mg, 1 mg, Intramuscular, PRN, Lorne Angel Jr., PA-DAVID    glucose chewable tablet 16 g, 16 g, Oral, PRN, Lorne Angel Jr., PA-DAVID    glucose chewable tablet 24 g, 24 g, Oral, PRN, Lorne Angel Jr., PA-C    insulin aspart U-100 pen 0-5 Units, 0-5 Units, Subcutaneous, QID (AC + HS) PRN, Lorne Angel Jr., PA-C    isosorbide mononitrate 24 hr tablet 60 mg, 60 mg, Oral, QHS, Lorne Angel Jr., TOSHIA    melatonin tablet 6 mg, 6 mg, Oral, Nightly PRN, Lorne Angel Jr., PA-C    metoprolol succinate (TOPROL-XL) 24 hr tablet 25 mg, 25 mg, Oral, Daily, Lorne Angel Jr., TOSHIA    naloxone 0.4 mg/mL injection 0.02 mg, 0.02 mg, Intravenous, PRN, Lorne Angel Jr., PA-C    ondansetron disintegrating tablet 8 mg, 8 mg, Oral, Q8H PRN, Lorne Angel Jr., PA-C    oxymetazoline 0.05 % nasal spray 2 spray, 2 spray, Each Nostril, Q8H, Lorne Angel Jr., PA-C    prochlorperazine injection Soln 5 mg, 5 mg, Intravenous, Q6H PRN, Lorne Angel Jr., PA-C    simethicone chewable tablet 80 mg, 1 tablet, Oral, QID PRN, Lorne Angel Jr., PA-C    sodium chloride 0.65 % nasal spray 2 spray, 2 spray, Each Nostril, Q4H, Lorne Angel Jr., TOSHIA    sodium chloride 0.9% flush 5 mL, 5 mL, Intravenous, PRN, Lorne Angel Jr., TOSHIA    Current Outpatient Medications:     acetaminophen (TYLENOL) 500 MG tablet, Take 500 mg by mouth daily as needed for Pain., Disp: , Rfl:     allopurinoL (ZYLOPRIM) 100 MG tablet, Take 1 tablet  (100 mg total) by mouth once daily., Disp: 90 tablet, Rfl: 3    bumetanide (BUMEX) 1 MG tablet, Take 2 tablets (2 mg total) by mouth every other day., Disp: 90 tablet, Rfl: 3    ferrous gluconate (FERGON) 324 MG tablet, TAKE 1 TABLET EVERY DAY WITH BREAKFAST, Disp: 90 tablet, Rfl: 3    isosorbide mononitrate (IMDUR) 60 MG 24 hr tablet, Take 1 tablet (60 mg total) by mouth every evening., Disp: 90 tablet, Rfl: 3    metoprolol succinate (TOPROL-XL) 25 MG 24 hr tablet, TAKE 1 TABLET ONE TIME DAILY, Disp: 90 tablet, Rfl: 3    omega-3 fatty acids/fish oil (FISH OIL-OMEGA-3 FATTY ACIDS) 300-1,000 mg capsule, Take 1 capsule by mouth once daily., Disp: , Rfl:     rosuvastatin (CRESTOR) 40 MG Tab, TAKE 1 TABLET EVERY EVENING., Disp: 90 tablet, Rfl: 3    sodium chloride (OCEAN) 0.65 % nasal spray, 1 spray by Nasal route as needed for Congestion., Disp: 44 mL, Rfl: 12    sodium chloride (SALINE NASAL) 0.65 % nasal spray, 2 sprays by Nasal route 3 (three) times daily., Disp: 44 mL, Rfl: 3    traZODone (DESYREL) 50 MG tablet, Take 1 tablet (50 mg total) by mouth every evening., Disp: 90 tablet, Rfl: 2    warfarin (COUMADIN) 5 MG tablet, TAKE 1/2 TABLET (2.5MG) SATURDAY, THEN TAKE 1 TABLET (5MG) ALL OTHER DAYS OR AS INSTRUCTED BY COUMADIN CLINIC (Patient taking differently: Take 5 mg by mouth every evening.), Disp: 90 tablet, Rfl: 3    Facility-Administered Medications Ordered in Other Encounters:     ceFAZolin 2 g in dextrose 5 % in water (D5W) 50 mL IVPB (MB+), 2 g, Intravenous, On Call Procedure, Yenifer Sandoval MD    HYDROmorphone injection 0.2 mg, 0.2 mg, Intravenous, Q5 Min PRN, Saeed Farrell MD    sodium chloride 0.9% flush 10 mL, 10 mL, Intravenous, PRN, Saeed Farrell MD   Continuous Infusions:   PRN Meds:  Current Facility-Administered Medications:     0.9%  NaCl infusion (for blood administration), , Intravenous, Q24H PRN    acetaminophen, 1,000 mg, Oral, Q8H PRN    acetaminophen, 650 mg, Oral, Q4H PRN     "albuterol-ipratropium, 3 mL, Nebulization, Q4H PRN    aluminum-magnesium hydroxide-simethicone, 30 mL, Oral, QID PRN    bisacodyL, 10 mg, Rectal, Daily PRN    glucagon (human recombinant), 1 mg, Intramuscular, PRN    glucose, 16 g, Oral, PRN    glucose, 24 g, Oral, PRN    insulin aspart U-100, 0-5 Units, Subcutaneous, QID (AC + HS) PRN    melatonin, 6 mg, Oral, Nightly PRN    naloxone, 0.02 mg, Intravenous, PRN    ondansetron, 8 mg, Oral, Q8H PRN    prochlorperazine, 5 mg, Intravenous, Q6H PRN    simethicone, 1 tablet, Oral, QID PRN    sodium chloride 0.9%, 5 mL, Intravenous, PRN    Allergies:   Review of patient's allergies indicates:   Allergen Reactions    Lisinopril     Losartan      Intolerance- elevates potassium level     Sedation Hx: No adverse events.    Labs:  PT/INR/PTT:  29.0/2.8/35.2 (08/05 0409)  WBC/Hgb/Hct/Plts:  7.22/6.4/21.5/159 (08/05 0409)  BUN/Cr/glu/ALT/AST/amyl/lip:  48/2.1/--/14/16/--/-- (08/05 0409)     Objective:  Vitals: Blood pressure (!) 151/70, pulse 66, temperature 97.5 °F (36.4 °C), temperature source Oral, resp. rate 15, height 5' 5" (1.651 m), weight 69.3 kg (152 lb 12.5 oz), SpO2 100%.     Physical Exam:  General: well developed, well nourished, no distress.   Head: normocephalic, atraumatic. Blood at left nare  Neck: No tracheal deviation. No palpable masses. Full ROM.   Neurologic: Alert and oriented. Thought content appropriate.  GCS: E4 V5 M6; Total: 15  Language: No aphasia  Speech: No dysarthria  Cranial nerves: face symmetric, tongue midline, CN II-XII grossly intact.   Eyes: pupils equal, round, reactive to light with accomodation, EOMI.   Pulmonary: normal respirations, no signs of respiratory distress  Abdomen: soft, non-distended, not tender to palpation  Vascular: Pulses 2+ and symmetric radial and dorsalis pedis. No LE edema.   Skin: Skin is warm, dry and intact.  Sensory: intact to light touch throughout  Motor Strength: Moves all extremities spontaneously with good " tone.  Full strength upper and lower extremities. No abnormal movements seen.     ASA: 2  MAL: 2    Plan:  -Plan for cerebral angiogram with possible intervention tomorrow (08/06/24)  -Sedation Plan: General anesthesia  -All diagnostics and imaging reviewed  -Please keep the pt NPO after midnight  -Risks & benefits of procedure explained in detail; patient consented and all questions answered  -Further reccs to follow procedure          Armin Jaramillo MD, MHA  Fellow, NeuroEndovascular Surgery, [facility]  Neurologist, [facility] [place], [place]

## 2024-08-05 NOTE — ASSESSMENT & PLAN NOTE
Creatine stable for now. BMP reviewed- noted Estimated Creatinine Clearance: 23.6 mL/min (A) (based on SCr of 2.1 mg/dL (H)). according to latest data. 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  Monitor UOP and serial BMP and adjust therapy as needed. Renally dose meds. Avoid nephrotoxic medications and procedures.  - low dose SSI, ACHS  - diabetic diet

## 2024-08-05 NOTE — HPI
Dariel Urbina is a 83 y.o. male with past medical and surgical history as detailed below that presents to the hospital for Epistaxis [R04.0] on 8/5/2024. Mr. Urbina is well known to the ENT service for recurrent epistaxis requiring surgical intervention. 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 Previous surgical hx for control of epistaxis with bilateral SP ligation followed by embolization, bilateral facial and internal maxillary artery embolization on 11/03/2023, 12/18/23, left AEA ligation on 2/28/24, and bilateral endoscopic control/cauterization in OR on 6/18/24.     Today he presents w his wife after bleeding overnight starting around 10pm which was persistent despite nasal clip, Afrin and pressure. Notes bleeding is primarily from left side again, reports episode was unprovoked as he was lying in bed. He was to see Dr. Russell in clinic today for routine ENT appt, endorses no significant bleeds for the past 2 weeks. ED provider placed rhino rocket on left with persistent nasal bleeding around packing prompting ENT consult. Receiving 1u pRBC given Hgb 6.4. INR therapeutic today at 2.8.      Denies difficulty breathing, SOB, known provoking factors.  Current anticoagulation: Coumadin

## 2024-08-05 NOTE — CONSULTS
[facility] Dept  Otorhinolaryngology-Head & Neck Surgery  Consult Note    Patient Name: Dariel Urbina  MRN: [id number]  Code Status: Full Code  Admission Date: 8/5/2024  Hospital Length of Stay: 0 days  Attending Physician: Suzanne Colunga MD  Primary Care Provider: Devon Langston Jr., MD    Patient information was obtained from patient, spouse/SO, past medical records, and ER records.     Inpatient consult to ENT  Consult performed by: Yesenia Cross MD  Consult ordered by: Mary Beth Wallace MD        Subjective:     Chief Complaint/Reason for Admission: epistaxis, anemia    History of Present Illness: Dariel Urbina is a 83 y.o. male with past medical and surgical history as detailed below that presents to the hospital for Epistaxis [R04.0] on 8/5/2024. Mr. Urbina is well known to the ENT service for recurrent epistaxis requiring surgical intervention. 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 Previous surgical hx for control of epistaxis with bilateral SP ligation followed by embolization, bilateral facial and internal maxillary artery embolization on 11/03/2023, 12/18/23, left AEA ligation on 2/28/24, and bilateral endoscopic control/cauterization in OR on 6/18/24.     Today he presents w his wife after bleeding overnight starting around 10pm which was persistent despite nasal clip, Afrin and pressure. Notes bleeding is primarily from left side again, reports episode was unprovoked as he was lying in bed. He was to see Dr. Russell in clinic today for routine ENT appt, endorses no significant bleeds for the past 2 weeks. ED provider placed rhino rocket on left with persistent nasal bleeding around packing prompting ENT consult. Receiving 1u pRBC given Hgb 6.4. INR therapeutic today at 2.8.      Denies difficulty breathing, SOB, known provoking factors.  Current anticoagulation: Coumadin      Medications:  Continuous Infusions:  Scheduled Meds:   oxymetazoline  2 spray Topical (Top) Once      PRN Meds:  Current Facility-Administered Medications:     0.9%  NaCl infusion (for blood administration), , Intravenous, Q24H PRN     Current Facility-Administered Medications on File Prior to Encounter   Medication    ceFAZolin 2 g in dextrose 5 % in water (D5W) 50 mL IVPB (MB+)    HYDROmorphone injection 0.2 mg    sodium chloride 0.9% flush 10 mL     Current Outpatient Medications on File Prior to Encounter   Medication Sig    acetaminophen (TYLENOL) 500 MG tablet Take 500 mg by mouth daily as needed for Pain.    allopurinoL (ZYLOPRIM) 100 MG tablet Take 1 tablet (100 mg total) by mouth once daily.    bumetanide (BUMEX) 1 MG tablet Take 2 tablets (2 mg total) by mouth every other day.    ferrous gluconate (FERGON) 324 MG tablet TAKE 1 TABLET EVERY DAY WITH BREAKFAST    isosorbide mononitrate (IMDUR) 60 MG 24 hr tablet Take 1 tablet (60 mg total) by mouth every evening.    metoprolol succinate (TOPROL-XL) 25 MG 24 hr tablet TAKE 1 TABLET ONE TIME DAILY    omega-3 fatty acids/fish oil (FISH OIL-OMEGA-3 FATTY ACIDS) 300-1,000 mg capsule Take 1 capsule by mouth once daily.    rosuvastatin (CRESTOR) 40 MG Tab TAKE 1 TABLET EVERY EVENING.    sodium chloride (OCEAN) 0.65 % nasal spray 1 spray by Nasal route as needed for Congestion.    sodium chloride (SALINE NASAL) 0.65 % nasal spray 2 sprays by Nasal route 3 (three) times daily.    traZODone (DESYREL) 50 MG tablet Take 1 tablet (50 mg total) by mouth every evening.    warfarin (COUMADIN) 5 MG tablet TAKE 1/2 TABLET (2.5MG) SATURDAY, THEN TAKE 1 TABLET (5MG) ALL OTHER DAYS OR AS INSTRUCTED BY COUMADIN CLINIC (Patient taking differently: Take 5 mg by mouth every evening.)       Review of patient's allergies indicates:   Allergen Reactions    Lisinopril     Losartan      Intolerance- elevates potassium level       Past Medical History:   Diagnosis Date    Anemia of chronic renal failure, stage 3 (moderate) 05/27/2015    Anticoagulant long-term use     Atherosclerosis  of coronary artery bypass graft of native heart without angina pectoris 09/11/2012    3-27-18 [facility] Two vessel coronary artery disease.   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    Bilateral carotid artery disease 02/09/2017    Bleeding from the nose     Bleeding nose 03/21/2018    Cancer     Cataract     CHF (congestive heart failure)     CKD (chronic kidney disease) stage 3, GFR 30-59 ml/min 05/27/2015    Claudication of left lower extremity 09/17/2014    Colon polyp     Encounter for blood transfusion     Gastroesophageal reflux disease without esophagitis 03/19/2018    Gastrointestinal hemorrhage associated with intestinal diverticulosis 04/01/2018    Glaucoma     H/O mechanical aortic valve replacement 09/17/2014    History of gout 09/26/2012    Hyperparathyroidism due to renal insufficiency 07/27/2015    Internal hemorrhoid 04/03/2018    Long term current use of anticoagulant therapy 09/26/2012    Mechanical heart valve present     Metabolic acidosis with normal anion gap and bicarbonate losses 03/20/2018    Mixed hyperlipidemia 09/26/2012    NSTEMI (non-ST elevated myocardial infarction) 03/21/2018    Obesity, diabetes, and hypertension syndrome 02/23/2016    Paroxysmal atrial fibrillation 02/06/2023    PVD (peripheral vascular disease) 09/11/2012    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     Past Surgical History:   Procedure Laterality Date    BACK SURGERY      CARDIAC CATHETERIZATION      CARDIAC VALVE REPLACEMENT      CARDIAC VALVE SURGERY      CARPAL TUNNEL RELEASE Right 05/19/2020    Procedure: RELEASE, CARPAL TUNNEL;  Surgeon: Rupesh Norris Jr., MD;   Location: [facility], [place], operated by [facility] OR;  Service: Plastics;  Laterality: Right;    CATARACT EXTRACTION Left 11/13/2022        COLON SURGERY      COLONOSCOPY N/A 03/31/2017    Procedure: COLONOSCOPY;  Surgeon: Bruno Raymond MD;  Location: [facility] ENDO (4TH FLR);  Service: Endoscopy;  Laterality: N/A;  Patient's wife requesting date.    COLONOSCOPY N/A 04/03/2018    Procedure: COLONOSCOPY;  Surgeon: Bonifacio Pelletier MD;  Location: [facility] ENDO (2ND FLR);  Service: Endoscopy;  Laterality: N/A;    COLONOSCOPY N/A 08/13/2018    Procedure: COLONOSCOPY;  Surgeon: Kam Barba MD;  Location: [facility] ENDO (2ND FLR);  Service: Endoscopy;  Laterality: N/A;  2nd floor: PA pressure 49; hx of moderate-severe valve disease     per Coumadin clinic-Patient can hold 5 days with lovenox bridge       ok to schedule per Katarina    CONTROL OF EPISTAXIS, POSTERIOR, USING NASAL PACKING OR CAUTERIZATION Bilateral 6/18/2024    Procedure: CONTROL OF EPISTAXIS, POSTERIOR, USING NASAL PACKING OR CAUTERIZATION;  Surgeon: Jono Russell MD;  Location: [facility] 2ND FLR;  Service: ENT;  Laterality: Bilateral;    CORONARY ANGIOGRAPHY N/A 10/04/2021    Procedure: Left heart cath +/- peripheral angiogram;  Surgeon: Jose Ruiz MD;  Location: [facility] CATH LAB;  Service: Cardiology;  Laterality: N/A;    CORONARY ANGIOGRAPHY N/A 3/2/2023    Procedure: Angiogram, Coronary;  Surgeon: Devon Garnica MD;  Location: [facility] CATH LAB;  Service: Cardiology;  Laterality: N/A;    CORONARY ANGIOPLASTY      CORONARY ARTERY BYPASS GRAFT      CORONARY BYPASS GRAFT ANGIOGRAPHY  10/04/2021    Procedure: Bypass graft study;  Surgeon: Jose Ruiz MD;  Location: [facility] CATH LAB;  Service: Cardiology;;    CORONARY BYPASS GRAFT ANGIOGRAPHY  3/2/2023    Procedure: Bypass graft study;  Surgeon: Devon Garnica MD;  Location: [facility] CATH LAB;  Service: Cardiology;;    ECHOCARDIOGRAM,TRANSESOPHAGEAL N/A 4/14/2023    Procedure: Transesophageal echo (KIRSTEN) intra-procedure log documentation;   Surgeon: [facility] Diagnostic Provider;  Location: [facility] EP LAB;  Service: Cardiology;  Laterality: N/A;    ENDOSCOPIC NASAL CAUTERIZATION Bilateral 11/3/2023    Procedure: CAUTERIZATION, NOSE, ENDOSCOPIC;  Surgeon: Jono Russell MD;  Location: [facility] OR 2ND FLR;  Service: ENT;  Laterality: Bilateral;    ENDOSCOPIC NASAL CAUTERIZATION N/A 12/18/2023    Procedure: CAUTERIZATION, NOSE, ENDOSCOPIC;  Surgeon: Jono Russell MD;  Location: [facility] OR 2ND FLR;  Service: ENT;  Laterality: N/A;    EYE SURGERY      FESS, WITH IMAGING GUIDANCE Left 2/28/2024    Procedure: FESS, WITH IMAGING GUIDANCE;  Surgeon: Jono Russell MD;  Location: [facility] OR [facility] FLR;  Service: ENT;  Laterality: Left;  Scan loaded ENT [employer]. CL    FUNCTIONAL ENDOSCOPIC SINUS SURGERY (FESS) Bilateral 6/14/2023    Procedure: FESS (FUNCTIONAL ENDOSCOPIC SINUS SURGERY);  Surgeon: Jono Russell MD;  [facility];  Service: ENT;  Laterality: Bilateral;    HERNIA REPAIR      INTRAOCULAR PROSTHESES INSERTION Left 11/13/2022    Procedure: INSERTION, IOL PROSTHESIS;  Surgeon: Alia Mckeon MD;  Location: [facility] OR 1ST FLR;  Service: Ophthalmology;  Laterality: Left;    INTRAOCULAR PROSTHESES INSERTION Right 12/04/2022    Procedure: INSERTION, IOL PROSTHESIS;  Surgeon: Alia Mckeon MD;  Location: [facility] OR 1ST FLR;  Service: Ophthalmology;  Laterality: Right;    LIGATION, ARTERY, ETHMOIDAL Left 2/28/2024    Procedure: LIGATION, ARTERY, ETHMOIDAL;  Surgeon: Jono Russell MD;  Location: [facility] OR 2ND FLR;  Service: ENT;  Laterality: Left;    LIGATION, ARTERY, SPHENOPALATINE, ENDOSCOPIC Bilateral 6/14/2023    Procedure: LIGATION, ARTERY, SPHENOPALATINE, ENDOSCOPIC;  Surgeon: Jono Russell MD;  Location: [facility] OR 2ND FLR;  Service: ENT;  Laterality: Bilateral;    PHACOEMULSIFICATION OF CATARACT Left 11/13/2022    Procedure: PHACOEMULSIFICATION, CATARACT;  Surgeon: Alia Mckeon MD;  [facility];  Service: Ophthalmology;   Laterality: Left;    PHACOEMULSIFICATION OF CATARACT Right 2022    Procedure: PHACOEMULSIFICATION, CATARACT;  Surgeon: Alia Mckeon MD;  Location: [facility] OR [address] [place] [address];  Service: Ophthalmology;  Laterality: Right;    SPINE SURGERY      TRANSESOPHAGEAL ECHOCARDIOGRAPHY N/A 3/22/2023    Procedure: ECHOCARDIOGRAM, TRANSESOPHAGEAL;  Surgeon: [facility] Diagnostic Provider;  Location: [facility] EP LAB;  Service: Anesthesiology;  Laterality: N/A;    TRANSESOPHAGEAL ECHOCARDIOGRAPHY N/A 2023    Procedure: ECHOCARDIOGRAM, TRANSESOPHAGEAL;  Surgeon: [facility] Diagnostic Provider;  Location: [facility] EP LAB;  Service: Anesthesiology;  Laterality: N/A;    VASECTOMY       Family History       Problem Relation (Age of Onset)    Alcohol abuse Father    Diabetes Brother    Heart disease Mother, Father, Brother, Sister    Heart failure Mother, Father, Brother    No Known Problems Sister, Maternal Grandmother, Maternal Grandfather, Paternal Grandmother, Paternal Grandfather, Maternal Aunt, Maternal Uncle, Paternal Aunt, Paternal Uncle          Tobacco Use    Smoking status: Former     Current packs/day: 0.00     Average packs/day: 1 pack/day for 20.0 years (20.0 ttl pk-yrs)     Types: Cigarettes     Start date: 1960     Quit date: 1980     Years since quittin.9     Passive exposure: Never    Smokeless tobacco: Never   Substance and Sexual Activity    Alcohol use: No    Drug use: No    Sexual activity: Not Currently     Partners: Female     Review of Systems   Constitutional:  Negative for chills, fever and unexpected weight change.   HENT:  Positive for nosebleeds and postnasal drip. Negative for congestion, drooling, facial swelling, sore throat, trouble swallowing and voice change.    Eyes:  Negative for visual disturbance.   Respiratory:  Negative for choking, shortness of breath and stridor.    Gastrointestinal:  Negative for nausea and vomiting.     Objective:     Vital Signs (Most Recent):  Temp: 98 °F (36.7 °C)  (08/05/24 0755)  Pulse: 67 (08/05/24 0755)  Resp: 20 (08/05/24 0755)  BP: (!) 164/74 (08/05/24 0755)  SpO2: 100 % (08/05/24 0755) Vital Signs (24h Range):  Temp:  [97.6 °F (36.4 °C)-98 °F (36.7 °C)] 98 °F (36.7 °C)  Pulse:  [60-72] 67  Resp:  [17-20] 20  SpO2:  [99 %-100 %] 100 %  BP: (128-165)/(61-74) 164/74     Weight: 69.3 kg (152 lb 12.5 oz)  Body mass index is 25.42 kg/m².         Physical Exam  NAD  Awake and alert  Nose w/ left rhino rocket in place with balloon inflated - documented as anterior pack though unclear of length used or amt in balloon  Posterior OP with large clot extending from nasopharynx into hypopharynx - unable to readily suction out  Right nasal cavity with clot and oozing anteriorly though not profusely bleeding prior to disturbing clot  Anterior septal perforation approx 1.5cm with rhino rocket visualized   Normal WOB, no stridor or stertor on room air      Procedure: Control of epistaxis -- Nasal packing  After obtaining consent from the patient/family, anterior rhinoscopy performed. The left rhino rocket was left in place in light of patient history and overall stable appearance of bleeding from left. A large clot was removed from the right nasal cavity revealing known anterior septal perforation. Initially without bleeding though with some time with bright red blood around nasal packing on left through perforation. A merocel was trimmed minimally and placed in right nasal cavity sprayed with Afrin. At the conclusion of the procedure, no active bleeding around the nasal pack was seen.       Significant Labs:  CBC:   Recent Labs   Lab 08/05/24  0409   WBC 7.22   RBC 2.15*   HGB 6.4*   HCT 21.5*      *   MCH 29.8   MCHC 29.8*     Coagulation:   Recent Labs   Lab 08/05/24  0409   LABPROT 29.0*   INR 2.8*   APTT 35.2*       Significant Diagnostics:  None    Assessment/Plan:     * Recurrent epistaxis  Dariel Urbina is a 83 y.o. male evaluated for Epistaxis on 8/5/2024.  Well known to the ENT service for recurrent epistaxis requiring surgical intervention. 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 Previous surgical hx for control of epistaxis with bilateral SP ligation followed by embolization, bilateral facial and internal maxillary artery embolization on 11/03/2023, 12/18/23, left AEA ligation on 2/28/24, and bilateral endoscopic control/cauterization in OR on 6/18/24.     Now with left rhinorocket placed by ED, right merocel placed by ENT. Receiving 1u pRBC given Hgb 6.4. INR therapeutic today at 2.8.  Discussed with staff Dr. Russell, given patient's extensive history of recurrent bleeds will plan for operative intervention tomorrow - nasal endoscopy with control of nasal hemorrhage.      -- Agree with admission to hospital medicine for obs with packing in place and assist to optimize patient as appropriate prior to surgery   -- Consult IR for angio possible embolization in light of various interventions including previous embolizations and ligations -- once completed will plan for nasal endoscopy    -- IR planning for 8/6 procedure  -- Plan for OR 8/8/24 for nasal endoscopy with control of hemorrhage/cauterization    - please keep NPO at midnight for ENT surgery planned for 8/8   - consent obtained, in room/chart and in media tab   - case request placed   -- Please cont anti-staph antibiotics while packing is in place  -- Nasal packing instructions  - Afrin to bilateral nares q8h for the next 48h  - May place mustache dressing under nose for trickling, ok if dressing becomes saturated with some red/brown drainage  - Nasal saline q4h while awake to bilateral packing to keep moist; with nightly application of vaseline/aquaphor to anterior nares  - Keep head of bed elevated  -- Remainder of care per medicine  -- Please page ENT on call with any additional questions or concerns         Addended as included above to include updated POC after discussion with staff and IR  VTE Risk  Mitigation (From admission, onward)           Ordered     IP VTE LOW RISK PATIENT  Once         08/05/24 0941     Place sequential compression device  Until discontinued         08/05/24 0941                    Thank you for your consult. I will follow-up with patient. Please contact us if you have any additional questions.    Yesenia Cross MD  Otorhinolaryngology-Head & Neck Surgery  [facility] - Emergency Dept

## 2024-08-05 NOTE — H&P
[facility] - Emergency Dept  [facility] Medicine  History & Physical    Patient Name: Dariel Urbina  MRN: [id number]  Patient Class: IP- Inpatient  Admission Date: 8/5/2024  Attending Physician: Suzanne Colunga MD   Primary Care Provider: Devon Langston Jr., MD         Patient information was obtained from patient, past medical records, and ER records.     Subjective:     Principal Problem:Recurrent epistaxis    Chief Complaint:   Chief Complaint   Patient presents with    Epistaxis     Started at 10pm last night; has nose clamp on from home; is vomiting clots; d/'d a mos ago for same issue; +coumadin        HPI: Patient is an 83 year old male with a PMHx of CHF, CKD, mechanical heart valve on Coumadin, recurrent epistaxis, anemia, NSTEMI, PVD and type 2 diabetes being admitted to medicine for recurrent epistaxis. Patient reports that epistaxis has been a recurrent issue for some time now. He is established with an ENT.  Patient states that he has daily nosebleeds a typically manages had home with compression with Afrin. Today he reports that his bleeding started approximately 10:00 p.m last night. He reports using almost half a bottle of Afrin to try to get the bleeding to stop and applying compression, but has been unsuccessful. Patient states that this amount of bleeding is unusual as it is causing him to gag and cough up the blood.  He states typically he is able to easily swallow clots that are dislodged. He does endorse some lightheadedness. He denies any chest pain or difficulty breathing.  He denies any abdominal pain, however he does endorse black tarry stools.  He has no other acute concerns at this time. Patient has an extensive surgical history attempting to resolve this issue.  Per patient and wife he has undergone multiple cauteries and ablation of blood vessels without success.    In the ED, vitals stable. Intake labs remarkable for Hgb 6.4, INR 2.8, Cr 2.1. He was type & screened, transfused 1  unit RBCs. ENT consulted and planning for IR embolization tomorrow on 8/6.    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    Bilateral carotid artery disease 02/09/2017    Bleeding from the nose     Bleeding nose 03/21/2018    Cancer     Cataract     CHF (congestive heart failure)     CKD (chronic kidney disease) stage 3, GFR 30-59 ml/min 05/27/2015    Claudication of left lower extremity 09/17/2014    Colon polyp     Encounter for blood transfusion     Gastroesophageal reflux disease without esophagitis 03/19/2018    Gastrointestinal hemorrhage associated with intestinal diverticulosis 04/01/2018    Glaucoma     H/O mechanical aortic valve replacement 09/17/2014    History of gout 09/26/2012    Hyperparathyroidism due to renal insufficiency 07/27/2015    Internal hemorrhoid 04/03/2018    Long term current use of anticoagulant therapy 09/26/2012    Mechanical heart valve present     Metabolic acidosis with normal anion gap and bicarbonate losses 03/20/2018    Mixed hyperlipidemia 09/26/2012    NSTEMI (non-ST elevated myocardial infarction) 03/21/2018    Obesity, diabetes, and hypertension syndrome 02/23/2016    Paroxysmal atrial fibrillation 02/06/2023    PVD (peripheral vascular disease) 09/11/2012    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       Past Surgical History:   Procedure  Laterality Date    BACK SURGERY      CARDIAC CATHETERIZATION      CARDIAC VALVE REPLACEMENT      CARDIAC VALVE SURGERY      CARPAL TUNNEL RELEASE Right 05/19/2020    Procedure: RELEASE, CARPAL TUNNEL;  Surgeon: Rupesh Norris Jr., MD;  Location: [facility];  Service: Plastics;  Laterality: Right;    CATARACT EXTRACTION Left 11/13/2022        COLON SURGERY      COLONOSCOPY N/A 03/31/2017    Procedure: COLONOSCOPY;  Surgeon: Bruno Raymond MD;  Location: [facility] ENDO (4TH FLR);  Service: Endoscopy;  Laterality: N/A;  Patient's wife requesting date.    COLONOSCOPY N/A 04/03/2018    Procedure: COLONOSCOPY;  Surgeon: Bonifacio Pelletier MD;  Location: [facility] ENDO (2ND FLR);  Service: Endoscopy;  Laterality: N/A;    COLONOSCOPY N/A 08/13/2018    Procedure: COLONOSCOPY;  Surgeon: Kam Barba MD;  Location: [facility] ENDO (2ND FLR);  Service: Endoscopy;  Laterality: N/A;  2nd floor: PA pressure 49; hx of moderate-severe valve disease     per Coumadin clinic-Patient can hold 5 days with lovenox bridge       ok to schedule per Katarina    CONTROL OF EPISTAXIS, POSTERIOR, USING NASAL PACKING OR CAUTERIZATION Bilateral 6/18/2024    Procedure: CONTROL OF EPISTAXIS, POSTERIOR, USING NASAL PACKING OR CAUTERIZATION;  Surgeon: Jono Russell MD;  Location: [facility] 2ND FLR;  Service: ENT;  Laterality: Bilateral;    CORONARY ANGIOGRAPHY N/A 10/04/2021    Procedure: Left heart cath +/- peripheral angiogram;  Surgeon: Jose Ruiz MD;  Location: [facility] CATH LAB;  Service: Cardiology;  Laterality: N/A;    CORONARY ANGIOGRAPHY N/A 3/2/2023    Procedure: Angiogram, Coronary;  Surgeon: Devon Garnica MD;  Location: [facility] CATH LAB;  Service: Cardiology;  Laterality: N/A;    CORONARY ANGIOPLASTY      CORONARY ARTERY BYPASS GRAFT      CORONARY BYPASS GRAFT ANGIOGRAPHY  10/04/2021    Procedure: Bypass graft study;  Surgeon: Jose Ruiz MD;  Location: [facility] CATH LAB;  Service: Cardiology;;    CORONARY BYPASS GRAFT ANGIOGRAPHY  3/2/2023     Procedure: Bypass graft study;  Surgeon: Devon Garnica MD;  Location: [facility] CATH LAB;  Service: Cardiology;;    ECHOCARDIOGRAM,TRANSESOPHAGEAL N/A 4/14/2023    Procedure: Transesophageal echo (KIRSTEN) intra-procedure log documentation;  Surgeon: [facility] Diagnostic Provider;  Location: [facility] EP LAB;  Service: Cardiology;  Laterality: N/A;    ENDOSCOPIC NASAL CAUTERIZATION Bilateral 11/3/2023    Procedure: CAUTERIZATION, NOSE, ENDOSCOPIC;  Surgeon: Jono Russell MD;  Location: [facility] OR 2ND FLR;  Service: ENT;  Laterality: Bilateral;    ENDOSCOPIC NASAL CAUTERIZATION N/A 12/18/2023    Procedure: CAUTERIZATION, NOSE, ENDOSCOPIC;  Surgeon: Jono Russell MD;  Location: [facility] OR 2ND FLR;  Service: ENT;  Laterality: N/A;    EYE SURGERY      FESS, WITH IMAGING GUIDANCE Left 2/28/2024    Procedure: FESS, WITH IMAGING GUIDANCE;  Surgeon: Jono Russell MD;  Location: [facility] OR 2ND FLR;  Service: ENT;  Laterality: Left;  Scan loaded ENT TitanFiletronic. CL    FUNCTIONAL ENDOSCOPIC SINUS SURGERY (FESS) Bilateral 6/14/2023    Procedure: FESS (FUNCTIONAL ENDOSCOPIC SINUS SURGERY);  Surgeon: Jono Russell MD;  Location: [facility] OR 2ND FLR;  Service: ENT;  Laterality: Bilateral;    HERNIA REPAIR      INTRAOCULAR PROSTHESES INSERTION Left 11/13/2022    Procedure: INSERTION, IOL PROSTHESIS;  Surgeon: Alia Mckeon MD;  Location: [facility] OR 1ST FLR;  Service: Ophthalmology;  Laterality: Left;    INTRAOCULAR PROSTHESES INSERTION Right 12/04/2022    Procedure: INSERTION, IOL PROSTHESIS;  Surgeon: Alia Mckeon MD;  Location: [facility] OR 1ST FLR;  Service: Ophthalmology;  Laterality: Right;    LIGATION, ARTERY, ETHMOIDAL Left 2/28/2024    Procedure: LIGATION, ARTERY, ETHMOIDAL;  Surgeon: Jono Russell MD;  Location: [facility] OR 2ND FLR;  Service: ENT;  Laterality: Left;    LIGATION, ARTERY, SPHENOPALATINE, ENDOSCOPIC Bilateral 6/14/2023    Procedure: LIGATION, ARTERY, SPHENOPALATINE, ENDOSCOPIC;  Surgeon: Jnoo Russell MD;  Location:  [facility] OR 2ND FLR;  Service: ENT;  Laterality: Bilateral;    PHACOEMULSIFICATION OF CATARACT Left 11/13/2022    Procedure: PHACOEMULSIFICATION, CATARACT;  Surgeon: Alia Mckeon MD;  Location: [facility] OR 1ST FLR;  Service: Ophthalmology;  Laterality: Left;    PHACOEMULSIFICATION OF CATARACT Right 12/04/2022    Procedure: PHACOEMULSIFICATION, CATARACT;  Surgeon: Alia Mckeon MD;  [facility];  Service: Ophthalmology;  Laterality: Right;    SPINE SURGERY      TRANSESOPHAGEAL ECHOCARDIOGRAPHY N/A 3/22/2023    Procedure: ECHOCARDIOGRAM, TRANSESOPHAGEAL;  Surgeon: [facility] Diagnostic Provider;  Location: [facility] EP LAB;  Service: Anesthesiology;  Laterality: N/A;    TRANSESOPHAGEAL ECHOCARDIOGRAPHY N/A 4/11/2023    Procedure: ECHOCARDIOGRAM, TRANSESOPHAGEAL;  Surgeon: [facility] Diagnostic Provider;  Location: [facility] EP LAB;  Service: Anesthesiology;  Laterality: N/A;    VASECTOMY         Review of patient's allergies indicates:   Allergen Reactions    Lisinopril     Losartan      Intolerance- elevates potassium level       Current Facility-Administered Medications on File Prior to Encounter   Medication    ceFAZolin 2 g in dextrose 5 % in water (D5W) 50 mL IVPB (MB+)    HYDROmorphone injection 0.2 mg    sodium chloride 0.9% flush 10 mL     Current Outpatient Medications on File Prior to Encounter   Medication Sig    acetaminophen (TYLENOL) 500 MG tablet Take 500 mg by mouth daily as needed for Pain.    allopurinoL (ZYLOPRIM) 100 MG tablet Take 1 tablet (100 mg total) by mouth once daily.    bumetanide (BUMEX) 1 MG tablet Take 2 tablets (2 mg total) by mouth every other day.    ferrous gluconate (FERGON) 324 MG tablet TAKE 1 TABLET EVERY DAY WITH BREAKFAST    isosorbide mononitrate (IMDUR) 60 MG 24 hr tablet Take 1 tablet (60 mg total) by mouth every evening.    metoprolol succinate (TOPROL-XL) 25 MG 24 hr tablet TAKE 1 TABLET ONE TIME DAILY    omega-3 fatty acids/fish oil (FISH OIL-OMEGA-3 FATTY ACIDS) 300-1,000 mg  capsule Take 1 capsule by mouth once daily.    rosuvastatin (CRESTOR) 40 MG Tab TAKE 1 TABLET EVERY EVENING.    sodium chloride (OCEAN) 0.65 % nasal spray 1 spray by Nasal route as needed for Congestion.    sodium chloride (SALINE NASAL) 0.65 % nasal spray 2 sprays by Nasal route 3 (three) times daily.    traZODone (DESYREL) 50 MG tablet Take 1 tablet (50 mg total) by mouth every evening.    warfarin (COUMADIN) 5 MG tablet TAKE 1/2 TABLET (2.5MG) SATURDAY, THEN TAKE 1 TABLET (5MG) ALL OTHER DAYS OR AS INSTRUCTED BY COUMADIN CLINIC (Patient taking differently: Take 5 mg by mouth every evening.)     Family History       Problem Relation (Age of Onset)    Alcohol abuse Father    Diabetes Brother    Heart disease Mother, Father, Brother, Sister    Heart failure Mother, Father, Brother    No Known Problems Sister, Maternal Grandmother, Maternal Grandfather, Paternal Grandmother, Paternal Grandfather, Maternal Aunt, Maternal Uncle, Paternal Aunt, Paternal Uncle          Tobacco Use    Smoking status: Former     Current packs/day: 0.00     Average packs/day: 1 pack/day for 20.0 years (20.0 ttl pk-yrs)     Types: Cigarettes     Start date: 1960     Quit date: 1980     Years since quittin.9     Passive exposure: Never    Smokeless tobacco: Never   Substance and Sexual Activity    Alcohol use: No    Drug use: No    Sexual activity: Not Currently     Partners: Female     Review of Systems   Constitutional:  Negative for activity change, chills and fever.   HENT:  Positive for nosebleeds. Negative for trouble swallowing.    Eyes:  Negative for photophobia and visual disturbance.   Respiratory:  Negative for chest tightness, shortness of breath and wheezing.    Cardiovascular:  Negative for chest pain, palpitations and leg swelling.   Gastrointestinal:  Negative for abdominal pain, constipation, diarrhea, nausea and vomiting.   Genitourinary:  Negative for dysuria, frequency, hematuria and urgency.    Musculoskeletal:  Negative for arthralgias, back pain and gait problem.   Skin:  Negative for color change and rash.   Neurological:  Negative for dizziness, syncope, weakness, light-headedness, numbness and headaches.   Psychiatric/Behavioral:  Negative for agitation and confusion. The patient is not nervous/anxious.      Objective:     Vital Signs (Most Recent):  Temp: 97.5 °F (36.4 °C) (08/05/24 0903)  Pulse: 66 (08/05/24 0903)  Resp: 15 (08/05/24 0903)  BP: (!) 151/70 (08/05/24 0903)  SpO2: 100 % (08/05/24 0903) Vital Signs (24h Range):  Temp:  [97.5 °F (36.4 °C)-98 °F (36.7 °C)] 97.5 °F (36.4 °C)  Pulse:  [60-72] 66  Resp:  [15-20] 15  SpO2:  [99 %-100 %] 100 %  BP: (128-165)/(61-74) 151/70     Weight: 69.3 kg (152 lb 12.5 oz)  Body mass index is 25.42 kg/m².     Physical Exam  Vitals and nursing note reviewed.   Constitutional:       General: He is not in acute distress.     Appearance: He is well-developed.   HENT:      Head: Normocephalic and atraumatic.      Nose:      Right Nostril: Epistaxis present.      Left Nostril: Epistaxis present.      Comments: Rhinorockets in place, bilateral     Mouth/Throat:      Pharynx: No oropharyngeal exudate.   Eyes:      Conjunctiva/sclera: Conjunctivae normal.      Pupils: Pupils are equal, round, and reactive to light.   Cardiovascular:      Rate and Rhythm: Normal rate and regular rhythm.      Heart sounds: Normal heart sounds.   Pulmonary:      Effort: Pulmonary effort is normal. No respiratory distress.      Breath sounds: Normal breath sounds. No wheezing.   Abdominal:      General: Bowel sounds are normal. There is no distension.      Palpations: Abdomen is soft.      Tenderness: There is no abdominal tenderness.   Musculoskeletal:         General: No tenderness. Normal range of motion.      Cervical back: Normal range of motion and neck supple.   Lymphadenopathy:      Cervical: No cervical adenopathy.   Skin:     General: Skin is warm and dry.      Capillary  Refill: Capillary refill takes less than 2 seconds.      Findings: No rash.   Neurological:      Mental Status: He is alert and oriented to person, place, and time.      Cranial Nerves: No cranial nerve deficit.      Sensory: No sensory deficit.      Coordination: Coordination normal.   Psychiatric:         Behavior: Behavior normal.         Thought Content: Thought content normal.         Judgment: Judgment normal.              CRANIAL NERVES     CN III, IV, VI   Pupils are equal, round, and reactive to light.       Significant Labs: All pertinent labs within the past 24 hours have been reviewed.    Significant Imaging: I have reviewed all pertinent imaging results/findings within the past 24 hours.  Assessment/Plan:     * Recurrent epistaxis  Acute blood loss anemia    Patient presents with recurrent epistaxis, follows closely outpatient with ENT.   - Hgb 6.4, transfused 1 unit  - trend CBC  - start Afrin q8h bilateral nares + q4h saline spray to packing  - start Keflex for staph coverage while packing in place  - ENT consulted  - planning for IR embolization tomorrow      Paroxysmal atrial fibrillation  Patient with Paroxysmal (<7 days) atrial fibrillation which is controlled currently with Beta Blocker. Patient is currently in sinus rhythm.[id number] Score: 4.  Anticoagulation indicated. Anticoagulation done with coumadin .    Type 2 diabetes mellitus with stage 4 chronic kidney disease, without long-term current use of insulin  Creatine stable for now. BMP reviewed- noted Estimated Creatinine Clearance: 23.6 mL/min (A) (based on SCr of 2.1 mg/dL (H)). according to latest data. 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  Monitor UOP and serial BMP and adjust therapy as needed. Renally dose meds. Avoid nephrotoxic medications and procedures.  - low dose SSI, ACHS  - diabetic diet    Chronic anticoagulation  - on coumadin for mechanical heart valve  - daily INR  - pharmacy consulted        VTE Risk  Mitigation (From admission, onward)           Ordered     IP VTE LOW RISK PATIENT  Once         08/05/24 0941     Place sequential compression device  Until discontinued         08/05/24 0941                                    Lorne Angel PA-C  Department of Hospital Medicine  [facility] - Emergency Dept

## 2024-08-05 NOTE — CONSULTS
PHARMACY CONSULT NOTE: WARFARIN  Dariel Urbina is a 83 y.o. male on warfarin therapy for Mechanical Heart Valve (aortic). PharmD has been consulted for warfarin dosing.    Current order: none  Home dose: 2.5 mg (5 mg x 0.5) every Mon, Fri; 5 mg (5 mg x 1) all other days  Coumadin clinic enrollment: Active  INR goal: 2-3    Lab Results   Component Value Date    INR 2.8 (H) 08/05/2024    INR 2.0 (H) 07/23/2024    INR 2.3 (H) 07/18/2024     Significant drug interactions: none  Diet: Adult Diabetic      Recommendation(s):   Patient admitted with epistaxis. Plan to hold warfarin tonight  Once stable, resume home dose of warfarin 5mg daily except 2.5mg on Monday and Friday.   If INR drops <2, will need to start heparin drip as patient has a mechanical valve.   Daily INR.  Pharmacy will continue to follow and monitor warfarin    Thank you for the consult,  Amna Salgado, PharmD, BCPS  [id number]    **Note: Consults are reviewed Monday-Friday 7:00am-3:30pm. THE ABOVE RECOMMENDATIONS ARE ONLY SUGGESTED.THE RECOMMENDATIONS SHOULD BE CONSIDERED IN CONJUNCTION WITH ALL PATIENT FACTORS. **

## 2024-08-06 ENCOUNTER — ANESTHESIA EVENT (OUTPATIENT)
Dept: INTERVENTIONAL RADIOLOGY/VASCULAR | Facility: HOSPITAL | Age: 84
End: 2024-08-06
Payer: MEDICARE

## 2024-08-06 ENCOUNTER — ANESTHESIA (OUTPATIENT)
Dept: SURGERY | Facility: HOSPITAL | Age: 84
DRG: 144 | End: 2024-08-06
Payer: MEDICARE

## 2024-08-06 LAB
ANION GAP SERPL CALC-SCNC: 8 MMOL/L (ref 8–16)
BASOPHILS # BLD AUTO: 0.03 K/UL (ref 0–0.2)
BASOPHILS NFR BLD: 0.6 % (ref 0–1.9)
BLD PROD TYP BPU: NORMAL
BLOOD UNIT EXPIRATION DATE: NORMAL
BLOOD UNIT TYPE CODE: 9500
BLOOD UNIT TYPE: NORMAL
BUN SERPL-MCNC: 49 MG/DL (ref 8–23)
CALCIUM SERPL-MCNC: 9.4 MG/DL (ref 8.7–10.5)
CHLORIDE SERPL-SCNC: 112 MMOL/L (ref 95–110)
CO2 SERPL-SCNC: 19 MMOL/L (ref 23–29)
CODING SYSTEM: NORMAL
CREAT SERPL-MCNC: 1.8 MG/DL (ref 0.5–1.4)
CROSSMATCH INTERPRETATION: NORMAL
DIFFERENTIAL METHOD BLD: ABNORMAL
DISPENSE STATUS: NORMAL
EOSINOPHIL # BLD AUTO: 0.1 K/UL (ref 0–0.5)
EOSINOPHIL NFR BLD: 2.7 % (ref 0–8)
ERYTHROCYTE [DISTWIDTH] IN BLOOD BY AUTOMATED COUNT: 17.9 % (ref 11.5–14.5)
EST. GFR  (NO RACE VARIABLE): 37.1 ML/MIN/1.73 M^2
GLUCOSE SERPL-MCNC: 74 MG/DL (ref 70–110)
HCT VFR BLD AUTO: 22.7 % (ref 40–54)
HGB BLD-MCNC: 6.7 G/DL (ref 14–18)
IMM GRANULOCYTES # BLD AUTO: 0.01 K/UL (ref 0–0.04)
IMM GRANULOCYTES NFR BLD AUTO: 0.2 % (ref 0–0.5)
INR PPP: 2.4 (ref 0.8–1.2)
LYMPHOCYTES # BLD AUTO: 1.1 K/UL (ref 1–4.8)
LYMPHOCYTES NFR BLD: 20 % (ref 18–48)
MCH RBC QN AUTO: 29.5 PG (ref 27–31)
MCHC RBC AUTO-ENTMCNC: 29.5 G/DL (ref 32–36)
MCV RBC AUTO: 100 FL (ref 82–98)
MONOCYTES # BLD AUTO: 0.6 K/UL (ref 0.3–1)
MONOCYTES NFR BLD: 11.4 % (ref 4–15)
NEUTROPHILS # BLD AUTO: 3.4 K/UL (ref 1.8–7.7)
NEUTROPHILS NFR BLD: 65.1 % (ref 38–73)
NRBC BLD-RTO: 0 /100 WBC
NUM UNITS TRANS PACKED RBC: NORMAL
PLATELET # BLD AUTO: 142 K/UL (ref 150–450)
PMV BLD AUTO: 10.2 FL (ref 9.2–12.9)
POCT GLUCOSE: 80 MG/DL (ref 70–110)
POCT GLUCOSE: 83 MG/DL (ref 70–110)
POTASSIUM SERPL-SCNC: 4.4 MMOL/L (ref 3.5–5.1)
PROTHROMBIN TIME: 25.1 SEC (ref 9–12.5)
RBC # BLD AUTO: 2.27 M/UL (ref 4.6–6.2)
SODIUM SERPL-SCNC: 139 MMOL/L (ref 136–145)
WBC # BLD AUTO: 5.26 K/UL (ref 3.9–12.7)

## 2024-08-06 PROCEDURE — B315YZZ FLUOROSCOPY OF BILATERAL COMMON CAROTID ARTERIES USING OTHER CONTRAST: ICD-10-PCS | Performed by: RADIOLOGY

## 2024-08-06 PROCEDURE — 25000003 PHARM REV CODE 250: Mod: HCNC | Performed by: PHYSICIAN ASSISTANT

## 2024-08-06 PROCEDURE — P9016 RBC LEUKOCYTES REDUCED: HCPCS | Mod: HCNC | Performed by: STUDENT IN AN ORGANIZED HEALTH CARE EDUCATION/TRAINING PROGRAM

## 2024-08-06 PROCEDURE — 21400001 HC TELEMETRY ROOM: Mod: HCNC

## 2024-08-06 PROCEDURE — 36415 COLL VENOUS BLD VENIPUNCTURE: CPT | Mod: HCNC | Performed by: STUDENT IN AN ORGANIZED HEALTH CARE EDUCATION/TRAINING PROGRAM

## 2024-08-06 PROCEDURE — B31CYZZ FLUOROSCOPY OF BILATERAL EXTERNAL CAROTID ARTERIES USING OTHER CONTRAST: ICD-10-PCS | Performed by: RADIOLOGY

## 2024-08-06 PROCEDURE — 86920 COMPATIBILITY TEST SPIN: CPT | Mod: HCNC | Performed by: STUDENT IN AN ORGANIZED HEALTH CARE EDUCATION/TRAINING PROGRAM

## 2024-08-06 PROCEDURE — 25000003 PHARM REV CODE 250: Mod: HCNC

## 2024-08-06 PROCEDURE — 80048 BASIC METABOLIC PNL TOTAL CA: CPT | Mod: HCNC | Performed by: PHYSICIAN ASSISTANT

## 2024-08-06 PROCEDURE — 63600175 PHARM REV CODE 636 W HCPCS: Mod: HCNC

## 2024-08-06 PROCEDURE — 85610 PROTHROMBIN TIME: CPT | Mod: HCNC | Performed by: STUDENT IN AN ORGANIZED HEALTH CARE EDUCATION/TRAINING PROGRAM

## 2024-08-06 PROCEDURE — 03LN3DZ OCCLUSION OF LEFT EXTERNAL CAROTID ARTERY WITH INTRALUMINAL DEVICE, PERCUTANEOUS APPROACH: ICD-10-PCS | Performed by: RADIOLOGY

## 2024-08-06 PROCEDURE — 03LM3DZ OCCLUSION OF RIGHT EXTERNAL CAROTID ARTERY WITH INTRALUMINAL DEVICE, PERCUTANEOUS APPROACH: ICD-10-PCS | Performed by: RADIOLOGY

## 2024-08-06 PROCEDURE — 85025 COMPLETE CBC W/AUTO DIFF WBC: CPT | Mod: HCNC | Performed by: PHYSICIAN ASSISTANT

## 2024-08-06 PROCEDURE — 36430 TRANSFUSION BLD/BLD COMPNT: CPT | Mod: HCNC

## 2024-08-06 PROCEDURE — 25000003 PHARM REV CODE 250: Mod: HCNC | Performed by: STUDENT IN AN ORGANIZED HEALTH CARE EDUCATION/TRAINING PROGRAM

## 2024-08-06 PROCEDURE — 25500020 PHARM REV CODE 255: Mod: HCNC | Performed by: STUDENT IN AN ORGANIZED HEALTH CARE EDUCATION/TRAINING PROGRAM

## 2024-08-06 PROCEDURE — 11000001 HC ACUTE MED/SURG PRIVATE ROOM: Mod: HCNC

## 2024-08-06 RX ORDER — GLUCAGON 1 MG
1 KIT INJECTION
Status: DISCONTINUED | OUTPATIENT
Start: 2024-08-06 | End: 2024-08-08 | Stop reason: HOSPADM

## 2024-08-06 RX ORDER — ONDANSETRON HYDROCHLORIDE 2 MG/ML
4 INJECTION, SOLUTION INTRAVENOUS DAILY PRN
Status: DISCONTINUED | OUTPATIENT
Start: 2024-08-06 | End: 2024-08-08 | Stop reason: HOSPADM

## 2024-08-06 RX ORDER — OXYCODONE HYDROCHLORIDE 5 MG/1
5 TABLET ORAL
Status: DISCONTINUED | OUTPATIENT
Start: 2024-08-06 | End: 2024-08-08 | Stop reason: HOSPADM

## 2024-08-06 RX ORDER — HALOPERIDOL 5 MG/ML
0.5 INJECTION INTRAMUSCULAR EVERY 10 MIN PRN
Status: DISCONTINUED | OUTPATIENT
Start: 2024-08-06 | End: 2024-08-08 | Stop reason: HOSPADM

## 2024-08-06 RX ORDER — LIDOCAINE HYDROCHLORIDE 20 MG/ML
INJECTION INTRAVENOUS
Status: DISCONTINUED | OUTPATIENT
Start: 2024-08-06 | End: 2024-08-06

## 2024-08-06 RX ORDER — HYDROMORPHONE HYDROCHLORIDE 1 MG/ML
0.2 INJECTION, SOLUTION INTRAMUSCULAR; INTRAVENOUS; SUBCUTANEOUS EVERY 5 MIN PRN
Status: DISCONTINUED | OUTPATIENT
Start: 2024-08-06 | End: 2024-08-08 | Stop reason: HOSPADM

## 2024-08-06 RX ORDER — SODIUM CHLORIDE 0.9 % (FLUSH) 0.9 %
10 SYRINGE (ML) INJECTION
Status: DISCONTINUED | OUTPATIENT
Start: 2024-08-06 | End: 2024-08-09 | Stop reason: HOSPADM

## 2024-08-06 RX ORDER — HYDROCODONE BITARTRATE AND ACETAMINOPHEN 500; 5 MG/1; MG/1
TABLET ORAL
Status: DISCONTINUED | OUTPATIENT
Start: 2024-08-06 | End: 2024-08-09 | Stop reason: HOSPADM

## 2024-08-06 RX ORDER — HYDRALAZINE HYDROCHLORIDE 25 MG/1
25 TABLET, FILM COATED ORAL EVERY 6 HOURS PRN
Status: DISCONTINUED | OUTPATIENT
Start: 2024-08-06 | End: 2024-08-09 | Stop reason: HOSPADM

## 2024-08-06 RX ORDER — IODIXANOL 320 MG/ML
102 INJECTION, SOLUTION INTRAVASCULAR
Status: COMPLETED | OUTPATIENT
Start: 2024-08-06 | End: 2024-08-06

## 2024-08-06 RX ORDER — ONDANSETRON HYDROCHLORIDE 2 MG/ML
INJECTION, SOLUTION INTRAVENOUS
Status: DISCONTINUED | OUTPATIENT
Start: 2024-08-06 | End: 2024-08-06

## 2024-08-06 RX ORDER — EPHEDRINE SULFATE 50 MG/ML
INJECTION, SOLUTION INTRAVENOUS
Status: DISCONTINUED | OUTPATIENT
Start: 2024-08-06 | End: 2024-08-06

## 2024-08-06 RX ORDER — PROPOFOL 10 MG/ML
VIAL (ML) INTRAVENOUS
Status: DISCONTINUED | OUTPATIENT
Start: 2024-08-06 | End: 2024-08-06

## 2024-08-06 RX ORDER — FENTANYL CITRATE 50 UG/ML
INJECTION, SOLUTION INTRAMUSCULAR; INTRAVENOUS
Status: DISCONTINUED | OUTPATIENT
Start: 2024-08-06 | End: 2024-08-06

## 2024-08-06 RX ORDER — DEXAMETHASONE SODIUM PHOSPHATE 4 MG/ML
INJECTION, SOLUTION INTRA-ARTICULAR; INTRALESIONAL; INTRAMUSCULAR; INTRAVENOUS; SOFT TISSUE
Status: DISCONTINUED | OUTPATIENT
Start: 2024-08-06 | End: 2024-08-06

## 2024-08-06 RX ORDER — ROCURONIUM BROMIDE 10 MG/ML
INJECTION, SOLUTION INTRAVENOUS
Status: DISCONTINUED | OUTPATIENT
Start: 2024-08-06 | End: 2024-08-06

## 2024-08-06 RX ORDER — SUCCINYLCHOLINE CHLORIDE 20 MG/ML
INJECTION INTRAMUSCULAR; INTRAVENOUS
Status: DISCONTINUED | OUTPATIENT
Start: 2024-08-06 | End: 2024-08-06

## 2024-08-06 RX ADMIN — NASAL 2 SPRAY: 6.5 SPRAY NASAL at 09:08

## 2024-08-06 RX ADMIN — ROCURONIUM BROMIDE 5 MG: 10 INJECTION INTRAVENOUS at 03:08

## 2024-08-06 RX ADMIN — ROCURONIUM BROMIDE 10 MG: 10 INJECTION INTRAVENOUS at 05:08

## 2024-08-06 RX ADMIN — LIDOCAINE HYDROCHLORIDE 100 MG: 20 INJECTION INTRAVENOUS at 03:08

## 2024-08-06 RX ADMIN — SODIUM CHLORIDE: 0.9 INJECTION, SOLUTION INTRAVENOUS at 03:08

## 2024-08-06 RX ADMIN — ROCURONIUM BROMIDE 25 MG: 10 INJECTION INTRAVENOUS at 04:08

## 2024-08-06 RX ADMIN — ALLOPURINOL 100 MG: 100 TABLET ORAL at 09:08

## 2024-08-06 RX ADMIN — EPHEDRINE SULFATE 5 MG: 50 INJECTION INTRAVENOUS at 04:08

## 2024-08-06 RX ADMIN — FENTANYL CITRATE 100 MCG: 50 INJECTION, SOLUTION INTRAMUSCULAR; INTRAVENOUS at 03:08

## 2024-08-06 RX ADMIN — HYDRALAZINE HYDROCHLORIDE 25 MG: 25 TABLET ORAL at 06:08

## 2024-08-06 RX ADMIN — SUGAMMADEX 200 MG: 100 INJECTION, SOLUTION INTRAVENOUS at 05:08

## 2024-08-06 RX ADMIN — EPHEDRINE SULFATE 10 MG: 50 INJECTION INTRAVENOUS at 04:08

## 2024-08-06 RX ADMIN — CEPHALEXIN 500 MG: 500 CAPSULE ORAL at 09:08

## 2024-08-06 RX ADMIN — PROPOFOL 120 MG: 10 INJECTION, EMULSION INTRAVENOUS at 03:08

## 2024-08-06 RX ADMIN — ONDANSETRON 4 MG: 2 INJECTION INTRAMUSCULAR; INTRAVENOUS at 05:08

## 2024-08-06 RX ADMIN — METOPROLOL SUCCINATE 25 MG: 25 TABLET, EXTENDED RELEASE ORAL at 09:08

## 2024-08-06 RX ADMIN — CEPHALEXIN 500 MG: 500 CAPSULE ORAL at 05:08

## 2024-08-06 RX ADMIN — SUCCINYLCHOLINE CHLORIDE 140 MG: 20 INJECTION, SOLUTION INTRAMUSCULAR; INTRAVENOUS at 03:08

## 2024-08-06 RX ADMIN — Medication 324 MG: at 09:08

## 2024-08-06 RX ADMIN — IODIXANOL 102 ML: 320 INJECTION, SOLUTION INTRAVASCULAR at 06:08

## 2024-08-06 RX ADMIN — DEXAMETHASONE SODIUM PHOSPHATE 4 MG: 4 INJECTION, SOLUTION INTRAMUSCULAR; INTRAVENOUS at 04:08

## 2024-08-06 RX ADMIN — ISOSORBIDE MONONITRATE 60 MG: 60 TABLET, EXTENDED RELEASE ORAL at 09:08

## 2024-08-06 NOTE — ASSESSMENT & PLAN NOTE
Acute blood loss anemia    Patient presents with recurrent epistaxis, follows closely outpatient with ENT. Hgb 6.4 on presentation. Given 1u pRBC on 6/5. Second unit on 8/6.   - trend CBC  - Afrin q8h bilateral nares + q4h saline spray to packing  - Keflex for staph coverage while packing in place  - ENT consulted  - planning for IR embolization 8/6

## 2024-08-06 NOTE — NURSING
There was an issue with the Pt's POCT BG uploading to Epic at bedtime reading. The Pt's POCT BG was 83 mg/dL at bedtime.

## 2024-08-06 NOTE — PLAN OF CARE
Pt arrived to 190 for carotid cerebral angiogram. Pt oriented to unit and staff, Pt safely transferred from stretcher to procedural table. Fall risk reviewed and comfort measures utilized with interventions. Safety strap applied, position pillows to minimize pressure points. Blankets applied. Pt prepped and draped utilizing standard sterile technique. Patient placed on continuous monitoring, as required by sedation policy. Timeouts implemented utilizing standard universal time-out per department and facility policy. CRNA to remain at bedside with continuous monitoring. Pt resting comfortably. Denies pain/discomfort. Will continue to monitor. See flow sheets for monitoring, medication administration, and updates. patient verbalizes understanding.

## 2024-08-06 NOTE — PLAN OF CARE
AAOX4. VS stable. PO ABX given. NPO at midnight for procedure, except for sips of water with medication. Rhino rockets remained in place with no bleed through noted. Pt refused scheduled nose sprays. Two soft, black BMs reported. No adverse events noted during this shift. No new complaints reported.    Problem: Adult Inpatient Plan of Care  Goal: Plan of Care Review  Outcome: Progressing  Flowsheets (Taken 8/6/2024 0519)  Plan of Care Reviewed With: patient  Goal: Patient-Specific Goal (Individualized)  Outcome: Progressing  Goal: Absence of Hospital-Acquired Illness or Injury  Outcome: Progressing  Intervention: Identify and Manage Fall Risk  Flowsheets (Taken 8/6/2024 0519)  Safety Promotion/Fall Prevention:   assistive device/personal item within reach   commode/urinal/bedpan at bedside   Fall Risk reviewed with patient/family   medications reviewed   nonskid shoes/socks when out of bed   patient expresses understanding of fall risk and prevention   room near unit station   side rails raised x 2  Goal: Optimal Comfort and Wellbeing  Outcome: Progressing  Intervention: Provide Person-Centered Care  Flowsheets (Taken 8/6/2024 0519)  Trust Relationship/Rapport:   care explained   choices provided   questions answered   questions encouraged   thoughts/feelings acknowledged  Goal: Readiness for Transition of Care  Outcome: Progressing     Problem: Diabetes Comorbidity  Goal: Blood Glucose Level Within Targeted Range  Outcome: Progressing  Intervention: Monitor and Manage Glycemia  Flowsheets (Taken 8/6/2024 0519)  Glycemic Management: blood glucose monitored     Problem: Fall Injury Risk  Goal: Absence of Fall and Fall-Related Injury  Outcome: Progressing  Intervention: Identify and Manage Contributors  Flowsheets (Taken 8/6/2024 0519)  Self-Care Promotion:   independence encouraged   BADL personal objects within reach  Medication Review/Management: medications reviewed  Intervention: Promote Injury-Free  Environment  Flowsheets (Taken 8/6/2024 7681)  Safety Promotion/Fall Prevention:   assistive device/personal item within reach   commode/urinal/bedpan at bedside   Fall Risk reviewed with patient/family   medications reviewed   nonskid shoes/socks when out of bed   patient expresses understanding of fall risk and prevention   room near unit station   side rails raised x 2

## 2024-08-06 NOTE — ASSESSMENT & PLAN NOTE
On coumadin for mechanical heart valve  - daily INR  - pharmacy consulted  - will need heparin if INR drops below 2

## 2024-08-06 NOTE — ASSESSMENT & PLAN NOTE
Anemia is likely due to acute blood loss which was from epistaxis . Most recent hemoglobin and hematocrit are listed below.  Recent Labs     08/05/24  0409 08/06/24  0223   HGB 6.4* 6.7*   HCT 21.5* 22.7*     Plan  - Monitor serial CBC: Daily  - Transfuse PRBC if patient becomes hemodynamically unstable, symptomatic or H/H drops below 7/21.  - Patient has received 2 units of PRBCs on 8/5, 8/6  - holding warfarin

## 2024-08-06 NOTE — DISCHARGE INSTRUCTIONS
For scheduling: Call Nellie at [phone]    For questions or concerns call: ELYSSA MON-FRI 8 AM- 5PM [phone]. Radiology resident on call [phone].    For immediate concerns that are not emergent, you may call our radiology clinic at: [phone]     PATIENT INSTRUCTIONS:    Nasal precautions  - DO NOT blow your nose for 2 weeks. The only exception is that you may lightly blow your nose after using a sinus rinse.  - Sneeze/cough with mouth open  - Avoid irritating substances that might make you sneeze, such as dust, chalk, harsh chemicals, and allergic triggers. This might also include spicy foods.  - Do not smoke   - Avoid flying or swimming for 2 weeks.    - Avoid all heavy lifting, straining or bending for 2 weeks.   - Avoid semi-contact sports or vigorous exercising for 3-4 weeks.      Aftercare/ moisturizing recommendations to decrease frequency of nosebleeds:  - Daily nasal saline sprays/rinses  - Nightly application of vaseline/aquaphor to anterior nares  - Room humidification  - Avoidance of nasal cannula if possible (always with humidification and please use face tent, nasal cup, facemask if possible)  Management of medical conditions contributing to epistaxis (coagulopathy, hypertension, etc.)  - Humidification of CPAP appliance if applicable    If rebleeding occurs:   - Apply Afrin liberally to both nostrils  - Apply pressure over the soft part of the nose for 15 minutes (clip or digital pressure, important to have pressure over soft part of nose and consistent pressure (do not release pressure at any point))  - Instruct patient to lean forward, avoid swallowing blood. This can cause nausea and vomiting  - Can repeat these steps above up to 3 times  - Call/reconsult ENT or return to the nearest emergency department if this is unsuccessful

## 2024-08-06 NOTE — SUBJECTIVE & OBJECTIVE
Interval History: patient feeling well this morning. Reports his pain is well controlled. Denies chest pain, SOB, n/v, c/d. Per nursing patient had dark BM overnight.       Review of Systems  Objective:     Vital Signs (Most Recent):  Temp: 97.1 °F (36.2 °C) (08/06/24 1225)  Pulse: (!) 53 (08/06/24 1225)  Resp: 18 (08/06/24 1225)  BP: (!) 183/70 (08/06/24 1225)  SpO2: (!) 94 % (08/06/24 1225) Vital Signs (24h Range):  Temp:  [97.1 °F (36.2 °C)-99 °F (37.2 °C)] 97.1 °F (36.2 °C)  Pulse:  [53-67] 53  Resp:  [17-20] 18  SpO2:  [94 %-100 %] 94 %  BP: (127-183)/() 183/70     Weight: 69.3 kg (152 lb 12.5 oz)  Body mass index is 25.42 kg/m².    Intake/Output Summary (Last 24 hours) at 8/6/2024 1310  Last data filed at 8/6/2024 1225  Gross per 24 hour   Intake 862 ml   Output --   Net 862 ml         Physical Exam  Vitals and nursing note reviewed.   Constitutional:       General: He is not in acute distress.     Appearance: He is well-developed.   HENT:      Head: Normocephalic and atraumatic.      Nose:      Right Nostril: Epistaxis present.      Left Nostril: Epistaxis present.      Comments: Rhinorockets in place, bilateral     Mouth/Throat:      Pharynx: No oropharyngeal exudate.   Eyes:      Conjunctiva/sclera: Conjunctivae normal.      Pupils: Pupils are equal, round, and reactive to light.   Cardiovascular:      Rate and Rhythm: Normal rate and regular rhythm.      Heart sounds: Normal heart sounds.   Pulmonary:      Effort: Pulmonary effort is normal. No respiratory distress.      Breath sounds: Normal breath sounds. No wheezing.   Abdominal:      General: Bowel sounds are normal. There is no distension.      Palpations: Abdomen is soft.      Tenderness: There is no abdominal tenderness.   Musculoskeletal:         General: No tenderness. Normal range of motion.      Cervical back: Normal range of motion and neck supple.   Lymphadenopathy:      Cervical: No cervical adenopathy.   Skin:     General: Skin is warm  and dry.      Capillary Refill: Capillary refill takes less than 2 seconds.      Findings: No rash.   Neurological:      Mental Status: He is alert and oriented to person, place, and time.      Cranial Nerves: No cranial nerve deficit.      Sensory: No sensory deficit.      Coordination: Coordination normal.   Psychiatric:         Behavior: Behavior normal.         Thought Content: Thought content normal.         Judgment: Judgment normal.             Significant Labs: All pertinent labs within the past 24 hours have been reviewed.  CBC:   Recent Labs   Lab 08/05/24 0409 08/06/24 0223   WBC 7.22 5.26   HGB 6.4* 6.7*   HCT 21.5* 22.7*    142*     CMP:   Recent Labs   Lab 08/05/24 0409 08/06/24 0223    139   K 4.3 4.4    112*   CO2 21* 19*   * 74   BUN 48* 49*   CREATININE 2.1* 1.8*   CALCIUM 9.8 9.4   PROT 6.6  --    ALBUMIN 2.9*  --    BILITOT 0.4  --    ALKPHOS 78  --    AST 16  --    ALT 14  --    ANIONGAP 9 8       Significant Imaging: I have reviewed all pertinent imaging results/findings within the past 24 hours.

## 2024-08-06 NOTE — ASSESSMENT & PLAN NOTE
Patient with Paroxysmal (<7 days) atrial fibrillation which is controlled currently with Beta Blocker. Patient is currently in sinus rhythm.[id number] Score: 4.  Anticoagulation indicated. Anticoagulation done with coumadin .

## 2024-08-06 NOTE — PLAN OF CARE
Pt tolerated carotid cerebral angiogram well under crna care, rg groin  6fr vascade closure device placed 1741, bed rest up 1941, transfer to pacu, report given @ bedside

## 2024-08-06 NOTE — HOSPITAL COURSE
Patient admitted for recurrent significant epistaxis requiring blood transfusions. Warfarin held on 8/5. Required 2nd unit of blood on 8/6 prior to going to OR for embolization. S/p IR embolization of the right maxillary, 2 left maxillary collaterals, and a dominant left facial arteries on 8/7. Patient's hgb stable on 8/7. Okay to restart warfarin, pharmacy managing. Plan for OR with ENT 8/8 for nasal endoscopy.     8/8 PMHx of CHF, CKD, mechanical heart valve on Coumadin, recurrent epistaxis, anemia, NSTEMI, PVD and type 2 diabetes - Patient admitted for recurrent significant epistaxis requiring blood transfusions. Warfarin held on 8/5. Required 2nd unit of blood on 8/6 prior to going to OR for embolization. S/p IR embolization of the right maxillary, 2 left maxillary collaterals, and a dominant left facial arteries on 8/7. Patient's hgb stable on 8/7. Okay to restart warfarin, pharmacy managing.  No epistaxis. OR with ENT today  for nasal endoscopy. continue keflex while packing is in place. Afrin to bilateral nares q8h for the next 48h. packs removed. ENT  cauterized  very superficial oozing on the left side - no packing in - irrigated his nasal passages well. okay to restart all blood thinners from ENT perspective outpatient follow up   8/9 Hb stable at 7.9. keflex discontinued .  ok for discharge for ENT perspective. Discharge with Nasal saline spray q4h WA applied into nasal cavities daily with nightly application of vaseline/aquaphor to anterior nares.  Keep head of bed elevated. Discharge with ENT follow up

## 2024-08-06 NOTE — PLAN OF CARE
[facility] ([place] [address])  Initial Discharge Assessment       Primary Care Provider: Devon Langston Jr., MD    Admission Diagnosis: Epistaxis [R04.0]  Acute blood loss anemia [D62]  Bleeding [R58]  Chest pain [R07.9]    Admission Date: 8/5/2024  Expected Discharge Date: 8/9/2024    Transition of Care Barriers: (P) None    Payor: [employer] MANAGED MEDICARE / Plan: [employer] MEDICARE HMO / Product Type: Capitation /     Extended Emergency Contact Information  Primary Emergency Contact: Ameena Urbina  Address: [address] [place], [place] [address], [place] [address] [facility]  Home Phone: [phone]  Mobile Phone: [phone]  Relation: Spouse    Discharge Plan A: (P) Home with family  Discharge Plan B: (P) Home with family      [facility] - [place], [place] - [address]  [address] [facility] [address] [address]  Phone: [phone] Fax: [phone]    [facility]  [address] [address]  Phone: [phone] Fax: [phone]    [facility][id number] - [place], [place] - [address]  [address]  [place] [address]  Phone: [phone] Fax: [phone]      Initial Assessment (most recent)       Adult Discharge Assessment - 08/06/24 1026          Discharge Assessment    Assessment Type Discharge Planning Assessment (P)      Confirmed/corrected address, phone number and insurance Yes (P)      Confirmed Demographics Correct on Facesheet (P)      Source of Information patient;family (P)      Communicated ASTER with patient/caregiver No (P)      Reason For Admission epistaxis (P)      People in Home spouse (P)      Facility Arrived From: n/a (P)      Do you expect to return to your current living situation? Yes (P)      Do you have help at home or someone to help you manage your care at home? Yes (P)      Who are your caregiver(s) and their phone number(s)? Ameena Urbina, spouse, [phone] (P)      Prior to hospitilization cognitive status: Unable to  Assess (P)      Current cognitive status: Alert/Oriented (P)      Walking or Climbing Stairs Difficulty no (P)      Dressing/Bathing Difficulty no (P)      Home Layout Able to live on 1st floor (P)      Equipment Currently Used at Home none (P)      Readmission within 30 days? No (P)      Do you currently have service(s) that help you manage your care at home? No (P)      Do you take prescription medications? Yes (P)      Do you have prescription coverage? Yes (P)      Coverage [employer] (P)      Do you have any problems affording any of your prescribed medications? No (P)      Who is going to help you get home at discharge? Ameena Urbina, spouse, [phone] (P)      How do you get to doctors appointments? family or friend will provide (P)      Are you on dialysis? No (P)      Do you take coumadin? Yes (P)      Who monitors your labs? [facility] (P)      Discharge Plan A Home with family (P)      Discharge Plan B Home with family (P)      DME Needed Upon Discharge  none (P)      Discharge Plan discussed with: Spouse/sig other;Patient (P)      Name(s) and Number(s) Ameena Urbina, spouse, [phone] (P)      Transition of Care Barriers None (P)         Physical Activity    On average, how many days per week do you engage in moderate to strenuous exercise (like a brisk walk)? 0 days (P)      On average, how many minutes do you engage in exercise at this level? 0 min (P)         Financial Resource Strain    How hard is it for you to pay for the very basics like food, housing, medical care, and heating? Not hard at all (P)         Housing Stability    In the last 12 months, was there a time when you were not able to pay the mortgage or rent on time? No (P)      At any time in the past 12 months, were you homeless or living in a shelter (including now)? No (P)         Transportation Needs    Has the lack of transportation kept you from medical appointments, meetings, work or from getting things needed for  daily living? No (P)         Food Insecurity    Within the past 12 months, you worried that your food would run out before you got the money to buy more. Never true (P)      Within the past 12 months, the food you bought just didn't last and you didn't have money to get more. Never true (P)         Stress    Do you feel stress - tense, restless, nervous, or anxious, or unable to sleep at night because your mind is troubled all the time - these days? Not at all (P)         Social Isolation    How often do you feel lonely or isolated from those around you?  Never (P)         Alcohol Use    Q1: How often do you have a drink containing alcohol? Never (P)      Q2: How many drinks containing alcohol do you have on a typical day when you are drinking? Patient does not drink (P)      Q3: How often do you have six or more drinks on one occasion? Never (P)         Utilities    In the past 12 months has the electric, gas, oil, or water company threatened to shut off services in your home? No (P)         Health Literacy    How often do you need to have someone help you when you read instructions, pamphlets, or other written material from your doctor or pharmacy? Always (P)         OTHER    Name(s) of People in Home Ameena Urbina, spouse, [phone] (P)                  CM spoke with pt and spouse in room.  Pt lives with Ameena Urbina, spouse, [phone] in 1 story home, came from home, wife will provide transportation home upon d/c.  No Hh, DME, HD.  Pt gets coumadin monitored at [facility].  Pt independent with bathing, dressing.    JAIME Schaefer, BS, RN, CCM      Discharge Plan A and Plan B have been determined by review of patient's clinical status, future medical and therapeutic needs, and coverage/benefits for post-acute care in coordination with multidisciplinary team members.

## 2024-08-06 NOTE — PROCEDURES
Interventional Neuroradiology Post-Procedure Note    Pre Op Diagnosis: Recurrent active epistaxis    Post Op Diagnosis: Same    Procedure: Diagnostic cerebral angiogram and Feeder Arteries Embolization    Procedure performed by: Jeff HEBERT, Devon; Armin Jaramillo MD    Written Informed Consent Obtained: Yes    Specimen Removed: NO    Estimated Blood Loss: Minimal    Procedure report:   A 6F sheath was placed into the right femoral artery and a 5F Ángel catheter was advanced into the aortic arch.  The bilateral CCA followed by ECA and selective bilateral Imax and left facial arteries were subselected and angiography of the brain was performed after injection into each of these vessels that revealed hypervascular blush from the distal right Imax, left Imax branches and left facial artery anastomoses to nasal cavity and turbinates.    Preliminary interpretation: Successful particle embolization using 150-250 micron PVA particles of the distal right maxillary, two left maxillary collaterals, and a dominant left facial artery.  Please see Imaging report for full details.    A right femoral artery angiogram was performed, the sheath removed and hemostasis achieved using 5F Vascade.  No hematoma was present at the time of hemostasis.    The patient tolerated the procedure well.     Plan:  -Bed rest for 2h  -Groin check and pulse check q2h   -Avoid carrying heavy weights > 10 lbs x 24 hrs   -Remove groin dressing tomorrow                       Armin Jaramillo MD, MHA  Fellow, NeuroEndovascular Surgery, [facility]  Neurologist, [facility] [place], [place]

## 2024-08-06 NOTE — PROGRESS NOTES
[facility] - [facility] ([place] [address])  Otorhinolaryngology-Head & Neck Surgery  Progress Note    Subjective:     Post-Op Info:  Procedure(s) (LRB):  CONTROL OF EPISTAXIS, POSTERIOR, USING NASAL PACKING OR CAUTERIZATION (Bilateral)  ENDOSCOPY, NOSE (N/A)      Hospital Day: 2     Interval History: No bleeding overnight from packing. Minor oozing per patient. Slept well, NPO today for IR procedure.     Medications:  Continuous Infusions:  Scheduled Meds:   allopurinoL  100 mg Oral Daily    bumetanide  2 mg Oral Every other day    cephALEXin  500 mg Oral Q8H    ferrous gluconate  324 mg Oral Daily with breakfast    isosorbide mononitrate  60 mg Oral QHS    metoprolol succinate  25 mg Oral Daily    oxymetazoline  2 spray Each Nostril Q8H    sodium chloride  2 spray Each Nostril Q4H     PRN Meds:  Current Facility-Administered Medications:     0.9%  NaCl infusion (for blood administration), , Intravenous, Q24H PRN    0.9%  NaCl infusion (for blood administration), , Intravenous, Q24H PRN    acetaminophen, 1,000 mg, Oral, Q8H PRN    acetaminophen, 650 mg, Oral, Q4H PRN    albuterol-ipratropium, 3 mL, Nebulization, Q4H PRN    aluminum-magnesium hydroxide-simethicone, 30 mL, Oral, QID PRN    bisacodyL, 10 mg, Rectal, Daily PRN    glucagon (human recombinant), 1 mg, Intramuscular, PRN    glucose, 16 g, Oral, PRN    glucose, 24 g, Oral, PRN    insulin aspart U-100, 0-5 Units, Subcutaneous, QID (AC + HS) PRN    melatonin, 6 mg, Oral, Nightly PRN    naloxone, 0.02 mg, Intravenous, PRN    ondansetron, 8 mg, Oral, Q8H PRN    prochlorperazine, 5 mg, Intravenous, Q6H PRN    simethicone, 1 tablet, Oral, QID PRN    sodium chloride, 1 spray, Each Nostril, PRN    sodium chloride 0.9%, 5 mL, Intravenous, PRN     Review of patient's allergies indicates:   Allergen Reactions    Lisinopril     Losartan      Intolerance- elevates potassium level     Objective:     Vital Signs (24h Range):  Temp:  [97.5 °F (36.4 °C)-99 °F (37.2 °C)] 98.3 °F  (36.8 °C)  Pulse:  [57-67] 58  Resp:  [15-21] 17  SpO2:  [95 %-100 %] 95 %  BP: (127-166)/() 161/105       Lines/Drains/Airways       Peripheral Intravenous Line  Duration                  Peripheral IV - Single Lumen 08/05/24 0701 20 G Anterior;Left Forearm 1 day                     Physical Exam  Left rhinorocket secured with tape with balloon inflated  Right merocel secured with tape with no significant saturation anteriorly   Posterior oropharynx clear of blood/clot   Resting in bed lying flat without difficulty      Significant Labs:  CBC:   Recent Labs   Lab 08/06/24  0223   WBC 5.26   RBC 2.27*   HGB 6.7*   HCT 22.7*   *   *   MCH 29.5   MCHC 29.5*     Coagulation:   Recent Labs   Lab 08/05/24  0409 08/06/24 0223   LABPROT 29.0* 25.1*   INR 2.8* 2.4*   APTT 35.2*  --        Significant Diagnostics:  None  Assessment/Plan:     * Recurrent epistaxis  Dariel Urbina is a 83 y.o. male evaluated for Epistaxis on 8/5/2024. Well known to the ENT service for recurrent epistaxis requiring surgical intervention. 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 Previous surgical hx for control of epistaxis with bilateral SP ligation followed by embolization, bilateral facial and internal maxillary artery embolization on 11/03/2023, 12/18/23, left AEA ligation on 2/28/24, and bilateral endoscopic control/cauterization in OR on 6/18/24.     Now with left rhinorocket placed by ED, right merocel placed by ENT. Receiving 1u pRBC given Hgb 6.4. INR therapeutic at 2.8.  Admitted to hospital medicine for obs with packing in place and assist to optimize patient as appropriate prior to surgery   Discussed with staff Dr. Russell, given patient's extensive history of recurrent bleeds will plan for operative intervention Thursday following IR procedure to evaluate nasal cavities - booked for nasal endoscopy with control of nasal hemorrhage.      -- Consult IR for angio possible embolization in light of various  interventions including previous embolizations and ligations -- once completed will plan for nasal endoscopy    -- IR  procedure today with possible embolization if appropriate  -- Plan for OR 8/8/24 for nasal endoscopy with control of hemorrhage/cauterization    - please keep NPO at midnight prior to procedure planned for 8/8   - consent obtained, in room/chart and in media tab   - case request placed   -- Please cont anti-staph antibiotics while packing is in place  -- Nasal packing instructions  - Afrin to bilateral nares q8h for the next 48h  - May place mustache dressing under nose for trickling, ok if dressing becomes saturated with some red/brown drainage  - Nasal saline q4h while awake to bilateral packing to keep moist; with nightly application of vaseline/aquaphor to anterior nares  - Keep head of bed elevated  -- Remainder of care per medicine  -- Please page ENT on call with any additional questions or concerns             Yesenia Cross MD  Otorhinolaryngology-Head & Neck Surgery  [facility] ([facility])

## 2024-08-06 NOTE — SUBJECTIVE & OBJECTIVE
Interval History: No bleeding overnight from packing. Minor oozing per patient. Slept well, NPO today for IR procedure.     Medications:  Continuous Infusions:  Scheduled Meds:   allopurinoL  100 mg Oral Daily    bumetanide  2 mg Oral Every other day    cephALEXin  500 mg Oral Q8H    ferrous gluconate  324 mg Oral Daily with breakfast    isosorbide mononitrate  60 mg Oral QHS    metoprolol succinate  25 mg Oral Daily    oxymetazoline  2 spray Each Nostril Q8H    sodium chloride  2 spray Each Nostril Q4H     PRN Meds:  Current Facility-Administered Medications:     0.9%  NaCl infusion (for blood administration), , Intravenous, Q24H PRN    0.9%  NaCl infusion (for blood administration), , Intravenous, Q24H PRN    acetaminophen, 1,000 mg, Oral, Q8H PRN    acetaminophen, 650 mg, Oral, Q4H PRN    albuterol-ipratropium, 3 mL, Nebulization, Q4H PRN    aluminum-magnesium hydroxide-simethicone, 30 mL, Oral, QID PRN    bisacodyL, 10 mg, Rectal, Daily PRN    glucagon (human recombinant), 1 mg, Intramuscular, PRN    glucose, 16 g, Oral, PRN    glucose, 24 g, Oral, PRN    insulin aspart U-100, 0-5 Units, Subcutaneous, QID (AC + HS) PRN    melatonin, 6 mg, Oral, Nightly PRN    naloxone, 0.02 mg, Intravenous, PRN    ondansetron, 8 mg, Oral, Q8H PRN    prochlorperazine, 5 mg, Intravenous, Q6H PRN    simethicone, 1 tablet, Oral, QID PRN    sodium chloride, 1 spray, Each Nostril, PRN    sodium chloride 0.9%, 5 mL, Intravenous, PRN     Review of patient's allergies indicates:   Allergen Reactions    Lisinopril     Losartan      Intolerance- elevates potassium level     Objective:     Vital Signs (24h Range):  Temp:  [97.5 °F (36.4 °C)-99 °F (37.2 °C)] 98.3 °F (36.8 °C)  Pulse:  [57-67] 58  Resp:  [15-21] 17  SpO2:  [95 %-100 %] 95 %  BP: (127-166)/() 161/105       Lines/Drains/Airways       Peripheral Intravenous Line  Duration                  Peripheral IV - Single Lumen 08/05/24 0701 20 G Anterior;Left Forearm 1 day                      Physical Exam  Left rhinorocket secured with tape with balloon inflated  Right merocel secured with tape with no significant saturation anteriorly   Posterior oropharynx clear of blood/clot   Resting in bed lying flat without difficulty      Significant Labs:  CBC:   Recent Labs   Lab 08/06/24 0223   WBC 5.26   RBC 2.27*   HGB 6.7*   HCT 22.7*   *   *   MCH 29.5   MCHC 29.5*     Coagulation:   Recent Labs   Lab 08/05/24  0409 08/06/24 0223   LABPROT 29.0* 25.1*   INR 2.8* 2.4*   APTT 35.2*  --        Significant Diagnostics:  None

## 2024-08-07 ENCOUNTER — PATIENT MESSAGE (OUTPATIENT)
Dept: OTOLARYNGOLOGY | Facility: CLINIC | Age: 84
End: 2024-08-07
Payer: MEDICARE

## 2024-08-07 LAB
ANION GAP SERPL CALC-SCNC: 11 MMOL/L (ref 8–16)
BASOPHILS # BLD AUTO: 0.01 K/UL (ref 0–0.2)
BASOPHILS NFR BLD: 0.3 % (ref 0–1.9)
BUN SERPL-MCNC: 41 MG/DL (ref 8–23)
CALCIUM SERPL-MCNC: 9.4 MG/DL (ref 8.7–10.5)
CHLORIDE SERPL-SCNC: 113 MMOL/L (ref 95–110)
CO2 SERPL-SCNC: 14 MMOL/L (ref 23–29)
CREAT SERPL-MCNC: 1.7 MG/DL (ref 0.5–1.4)
DIFFERENTIAL METHOD BLD: ABNORMAL
EOSINOPHIL # BLD AUTO: 0 K/UL (ref 0–0.5)
EOSINOPHIL NFR BLD: 0 % (ref 0–8)
ERYTHROCYTE [DISTWIDTH] IN BLOOD BY AUTOMATED COUNT: 17.9 % (ref 11.5–14.5)
EST. GFR  (NO RACE VARIABLE): 39.5 ML/MIN/1.73 M^2
GLUCOSE SERPL-MCNC: 111 MG/DL (ref 70–110)
HCT VFR BLD AUTO: 26.2 % (ref 40–54)
HGB BLD-MCNC: 8 G/DL (ref 14–18)
IMM GRANULOCYTES # BLD AUTO: 0.02 K/UL (ref 0–0.04)
IMM GRANULOCYTES NFR BLD AUTO: 0.6 % (ref 0–0.5)
INR PPP: 2.3 (ref 0.8–1.2)
LYMPHOCYTES # BLD AUTO: 0.3 K/UL (ref 1–4.8)
LYMPHOCYTES NFR BLD: 8.5 % (ref 18–48)
MCH RBC QN AUTO: 30 PG (ref 27–31)
MCHC RBC AUTO-ENTMCNC: 30.5 G/DL (ref 32–36)
MCV RBC AUTO: 98 FL (ref 82–98)
MONOCYTES # BLD AUTO: 0 K/UL (ref 0.3–1)
MONOCYTES NFR BLD: 1.1 % (ref 4–15)
NEUTROPHILS # BLD AUTO: 3.3 K/UL (ref 1.8–7.7)
NEUTROPHILS NFR BLD: 89.5 % (ref 38–73)
NRBC BLD-RTO: 0 /100 WBC
PLATELET # BLD AUTO: 139 K/UL (ref 150–450)
PMV BLD AUTO: 10.4 FL (ref 9.2–12.9)
POTASSIUM SERPL-SCNC: 4.7 MMOL/L (ref 3.5–5.1)
PROTHROMBIN TIME: 24.2 SEC (ref 9–12.5)
RBC # BLD AUTO: 2.67 M/UL (ref 4.6–6.2)
SODIUM SERPL-SCNC: 138 MMOL/L (ref 136–145)
WBC # BLD AUTO: 3.63 K/UL (ref 3.9–12.7)

## 2024-08-07 PROCEDURE — 85610 PROTHROMBIN TIME: CPT | Mod: HCNC | Performed by: STUDENT IN AN ORGANIZED HEALTH CARE EDUCATION/TRAINING PROGRAM

## 2024-08-07 PROCEDURE — 25000003 PHARM REV CODE 250: Mod: HCNC | Performed by: PHYSICIAN ASSISTANT

## 2024-08-07 PROCEDURE — 25000003 PHARM REV CODE 250: Mod: HCNC | Performed by: STUDENT IN AN ORGANIZED HEALTH CARE EDUCATION/TRAINING PROGRAM

## 2024-08-07 PROCEDURE — 85025 COMPLETE CBC W/AUTO DIFF WBC: CPT | Mod: HCNC | Performed by: STUDENT IN AN ORGANIZED HEALTH CARE EDUCATION/TRAINING PROGRAM

## 2024-08-07 PROCEDURE — 21400001 HC TELEMETRY ROOM: Mod: HCNC

## 2024-08-07 PROCEDURE — 80048 BASIC METABOLIC PNL TOTAL CA: CPT | Mod: HCNC | Performed by: STUDENT IN AN ORGANIZED HEALTH CARE EDUCATION/TRAINING PROGRAM

## 2024-08-07 PROCEDURE — 36415 COLL VENOUS BLD VENIPUNCTURE: CPT | Mod: HCNC | Performed by: STUDENT IN AN ORGANIZED HEALTH CARE EDUCATION/TRAINING PROGRAM

## 2024-08-07 RX ORDER — WARFARIN 2.5 MG/1
2.5 TABLET ORAL
Status: CANCELLED | OUTPATIENT
Start: 2024-08-09

## 2024-08-07 RX ORDER — WARFARIN 2.5 MG/1
5 TABLET ORAL ONCE
Status: COMPLETED | OUTPATIENT
Start: 2024-08-07 | End: 2024-08-07

## 2024-08-07 RX ORDER — WARFARIN 2.5 MG/1
5 TABLET ORAL
Status: CANCELLED | OUTPATIENT
Start: 2024-08-07

## 2024-08-07 RX ORDER — POLYETHYLENE GLYCOL 3350 17 G/17G
17 POWDER, FOR SOLUTION ORAL DAILY
Status: DISCONTINUED | OUTPATIENT
Start: 2024-08-07 | End: 2024-08-09 | Stop reason: HOSPADM

## 2024-08-07 RX ORDER — WARFARIN 2.5 MG/1
5 TABLET ORAL
Status: CANCELLED | OUTPATIENT
Start: 2024-08-08

## 2024-08-07 RX ADMIN — ALLOPURINOL 100 MG: 100 TABLET ORAL at 08:08

## 2024-08-07 RX ADMIN — POLYETHYLENE GLYCOL 3350 17 G: 17 POWDER, FOR SOLUTION ORAL at 08:08

## 2024-08-07 RX ADMIN — NASAL 2 SPRAY: 6.5 SPRAY NASAL at 04:08

## 2024-08-07 RX ADMIN — METOPROLOL SUCCINATE 12.5 MG: 25 TABLET, EXTENDED RELEASE ORAL at 08:08

## 2024-08-07 RX ADMIN — CEPHALEXIN 500 MG: 500 CAPSULE ORAL at 09:08

## 2024-08-07 RX ADMIN — ISOSORBIDE MONONITRATE 60 MG: 60 TABLET, EXTENDED RELEASE ORAL at 09:08

## 2024-08-07 RX ADMIN — NASAL 2 SPRAY: 6.5 SPRAY NASAL at 08:08

## 2024-08-07 RX ADMIN — NASAL 2 SPRAY: 6.5 SPRAY NASAL at 06:08

## 2024-08-07 RX ADMIN — BUMETANIDE 2 MG: 1 TABLET ORAL at 08:08

## 2024-08-07 RX ADMIN — NASAL 2 SPRAY: 6.5 SPRAY NASAL at 03:08

## 2024-08-07 RX ADMIN — WARFARIN SODIUM 5 MG: 2.5 TABLET ORAL at 04:08

## 2024-08-07 RX ADMIN — Medication 324 MG: at 08:08

## 2024-08-07 RX ADMIN — CEPHALEXIN 500 MG: 500 CAPSULE ORAL at 03:08

## 2024-08-07 RX ADMIN — NASAL 2 SPRAY: 6.5 SPRAY NASAL at 09:08

## 2024-08-07 RX ADMIN — CEPHALEXIN 500 MG: 500 CAPSULE ORAL at 06:08

## 2024-08-07 NOTE — NURSING
Pt arrived to unit with PACU nurse. Cardiac monitoring transferred to bedside monitor. VS stable. Dressing clean, dry, and intact. Left leg remains in brace. Pt in bed at low position, with 2-side rails up, and call light in bed. No new concerns reported.

## 2024-08-07 NOTE — PROGRESS NOTES
Abdulkadir travis - [facility] Surg ([address] Medicine  Progress Note    Patient Name: Dariel Urbina  MRN: [id number]  Patient Class: IP- Inpatient   Admission Date: 8/5/2024  Length of Stay: 2 days  Attending Physician: Suzanne Colunga MD  Primary Care Provider: Devon Langston Jr., MD        Subjective:     Principal Problem:Recurrent epistaxis        HPI:  Patient is an 83 year old male with a PMHx of CHF, CKD, mechanical heart valve on Coumadin, recurrent epistaxis, anemia, NSTEMI, PVD and type 2 diabetes being admitted to medicine for recurrent epistaxis. Patient reports that epistaxis has been a recurrent issue for some time now. He is established with an ENT.  Patient states that he has daily nosebleeds a typically manages had home with compression with Afrin. Today he reports that his bleeding started approximately 10:00 p.m last night. He reports using almost half a bottle of Afrin to try to get the bleeding to stop and applying compression, but has been unsuccessful. Patient states that this amount of bleeding is unusual as it is causing him to gag and cough up the blood.  He states typically he is able to easily swallow clots that are dislodged. He does endorse some lightheadedness. He denies any chest pain or difficulty breathing.  He denies any abdominal pain, however he does endorse black tarry stools.  He has no other acute concerns at this time. Patient has an extensive surgical history attempting to resolve this issue.  Per patient and wife he has undergone multiple cauteries and ablation of blood vessels without success.    In the ED, vitals stable. Intake labs remarkable for Hgb 6.4, INR 2.8, Cr 2.1. He was type & screened, transfused 1 unit RBCs. ENT consulted and planning for IR embolization tomorrow on 8/6.    Overview/Hospital Course:  Patient admitted for recurrent significant epistaxis requiring blood transfusions. Warfarin held on 8/5. Required 2nd unit of blood on 8/6 prior to going  to OR for embolization. S/p IR embolization of the right maxillary, 2 left maxillary collaterals, and a dominant left facial arteries on 8/7. Patient's hgb stable on 8/7. Okay to restart warfarin, pharmacy managing. Plan for OR with ENT 8/8 for nasal endoscopy.     Interval History: No acute events overnight. Denies chest pain, SOB, n/v, c/d. Patient reports some mild bloody drainage from nostrils while eating but otherwise no signs of bleeding. He reports he is feeling good.         Review of Systems  Objective:     Vital Signs (Most Recent):  Temp: 97.6 °F (36.4 °C) (08/07/24 0741)  Pulse: 63 (08/07/24 0741)  Resp: 18 (08/07/24 0741)  BP: 133/62 (08/07/24 0741)  SpO2: 98 % (08/07/24 1015) Vital Signs (24h Range):  Temp:  [97.1 °F (36.2 °C)-98.6 °F (37 °C)] 97.6 °F (36.4 °C)  Pulse:  [53-79] 63  Resp:  [16-20] 18  SpO2:  [93 %-100 %] 98 %  BP: (133-188)/(58-77) 133/62     Weight: 69.3 kg (152 lb 12.5 oz)  Body mass index is 25.42 kg/m².    Intake/Output Summary (Last 24 hours) at 8/7/2024 1043  Last data filed at 8/7/2024 0629  Gross per 24 hour   Intake 1613 ml   Output 750 ml   Net 863 ml         Physical Exam  Vitals and nursing note reviewed.   Constitutional:       General: He is not in acute distress.     Appearance: He is well-developed.   HENT:      Head: Normocephalic and atraumatic.      Nose:      Comments: Rhinorockets in place, bilateral     Mouth/Throat:      Pharynx: No oropharyngeal exudate.   Eyes:      Conjunctiva/sclera: Conjunctivae normal.      Pupils: Pupils are equal, round, and reactive to light.   Cardiovascular:      Rate and Rhythm: Normal rate and regular rhythm.      Heart sounds: Murmur heard.   Pulmonary:      Effort: Pulmonary effort is normal. No respiratory distress.      Breath sounds: Normal breath sounds. No wheezing.   Abdominal:      General: Bowel sounds are normal. There is no distension.      Palpations: Abdomen is soft.      Tenderness: There is no abdominal tenderness.    Musculoskeletal:         General: No tenderness. Normal range of motion.      Cervical back: Normal range of motion and neck supple.   Lymphadenopathy:      Cervical: No cervical adenopathy.   Skin:     General: Skin is warm and dry.      Capillary Refill: Capillary refill takes less than 2 seconds.      Findings: No rash.   Neurological:      Mental Status: He is alert and oriented to person, place, and time.      Cranial Nerves: No cranial nerve deficit.      Sensory: No sensory deficit.      Coordination: Coordination normal.   Psychiatric:         Behavior: Behavior normal.         Thought Content: Thought content normal.         Judgment: Judgment normal.             Significant Labs: All pertinent labs within the past 24 hours have been reviewed.  CBC:   Recent Labs   Lab 08/06/24 0223 08/07/24  0324   WBC 5.26 3.63*   HGB 6.7* 8.0*   HCT 22.7* 26.2*   * 139*     CMP:   Recent Labs   Lab 08/06/24 0223 08/07/24 0324    138   K 4.4 4.7   * 113*   CO2 19* 14*   GLU 74 111*   BUN 49* 41*   CREATININE 1.8* 1.7*   CALCIUM 9.4 9.4   ANIONGAP 8 11       Significant Imaging: I have reviewed all pertinent imaging results/findings within the past 24 hours.    Assessment/Plan:      * Recurrent epistaxis  + Acute blood loss anemia  Patient presents with recurrent epistaxis, follows closely outpatient with ENT. Hgb 6.4 on presentation. Given 1u pRBC on 6/5. Second unit on 8/6. S/p IR embolization of the right maxillary, 2 left maxillary collaterals, and a dominant left facial arteries on 8/7. Patient's hgb stable on 8/7. Okay to restart warfarin, pharmacy managing. Plan for OR with ENT 8/8 for nasal endoscopy.   - trend CBC  - Afrin q8h bilateral nares + q4h saline spray to packing  - Keflex for staph coverage while packing in place  - ENT consulted > procedure planned 8/8  - HOB elevated      Acute blood loss anemia  Anemia is likely due to acute blood loss which was from epistaxis . Most recent  hemoglobin and hematocrit are listed below.  Recent Labs     08/05/24  0409 08/06/24  0223 08/07/24  0324   HGB 6.4* 6.7* 8.0*   HCT 21.5* 22.7* 26.2*       Plan  - Monitor serial CBC: Daily  - Transfuse PRBC if patient becomes hemodynamically unstable, symptomatic or H/H drops below 7/21.  - Patient has received 2 units of PRBCs on 8/5, 8/6  - resuming warfarin on 8/7    Paroxysmal atrial fibrillation  Patient with Paroxysmal (<7 days) atrial fibrillation which is controlled currently with Beta Blocker. Patient is currently in sinus rhythm.[id number] Score: 4.  Anticoagulation indicated. Anticoagulation done with coumadin .      Type 2 diabetes mellitus with stage 4 chronic kidney disease, without long-term current use of insulin  Creatine stable for now. BMP reviewed- noted Estimated Creatinine Clearance: 28.6 mL/min (A) (based on SCr of 1.7 mg/dL (H)). according to latest data. 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  Monitor UOP and serial BMP and adjust therapy as needed. Renally dose meds. Avoid nephrotoxic medications and procedures.  - low dose SSI, ACHS  - diabetic diet    Chronic anticoagulation  On coumadin for mechanical heart valve  - daily INR  - pharmacy consulted  - restarting warfarin 8/7         VTE Risk Mitigation (From admission, onward)           Ordered     IP VTE LOW RISK PATIENT  Once         08/05/24 0941     Place sequential compression device  Until discontinued         08/05/24 0941                    Discharge Planning   ASTER: 8/9/2024     Code Status: Full Code   Is the patient medically ready for discharge?:     Reason for patient still in hospital (select all that apply): Patient trending condition, Treatment, and Consult recommendations  Discharge Plan A: Home with family                  Suzanne Colunga MD  Department of Hospital Medicine   [facility] - Med Surg ([place])

## 2024-08-07 NOTE — SUBJECTIVE & OBJECTIVE
Interval History: No oozing after IR procedure. Notes nasal passages feel dry. Planning for OR tomorrow for nasal endoscopy.     Medications:  Continuous Infusions:  Scheduled Meds:   allopurinoL  100 mg Oral Daily    bumetanide  2 mg Oral Every other day    cephALEXin  500 mg Oral Q8H    ferrous gluconate  324 mg Oral Daily with breakfast    isosorbide mononitrate  60 mg Oral QHS    metoprolol succinate  12.5 mg Oral Daily    oxymetazoline  2 spray Each Nostril Q8H    sodium chloride  2 spray Each Nostril Q4H     PRN Meds:  Current Facility-Administered Medications:     0.9%  NaCl infusion (for blood administration), , Intravenous, Q24H PRN    0.9%  NaCl infusion (for blood administration), , Intravenous, Q24H PRN    acetaminophen, 1,000 mg, Oral, Q8H PRN    acetaminophen, 650 mg, Oral, Q4H PRN    albuterol-ipratropium, 3 mL, Nebulization, Q4H PRN    aluminum-magnesium hydroxide-simethicone, 30 mL, Oral, QID PRN    bisacodyL, 10 mg, Rectal, Daily PRN    glucagon (human recombinant), 1 mg, Intramuscular, PRN    glucagon (human recombinant), 1 mg, Intramuscular, PRN    glucose, 16 g, Oral, PRN    glucose, 24 g, Oral, PRN    haloperidol lactate, 0.5 mg, Intravenous, Q10 Min PRN    hydrALAZINE, 25 mg, Oral, Q6H PRN    HYDROmorphone, 0.2 mg, Intravenous, Q5 Min PRN    HYDROmorphone, 0.2 mg, Intravenous, Q5 Min PRN    melatonin, 6 mg, Oral, Nightly PRN    naloxone, 0.02 mg, Intravenous, PRN    ondansetron, 8 mg, Oral, Q8H PRN    ondansetron, 4 mg, Intravenous, Daily PRN    oxyCODONE, 5 mg, Oral, Q3H PRN    prochlorperazine, 5 mg, Intravenous, Q6H PRN    simethicone, 1 tablet, Oral, QID PRN    sodium chloride, 1 spray, Each Nostril, PRN    sodium chloride 0.9%, 10 mL, Intravenous, PRN    sodium chloride 0.9%, 5 mL, Intravenous, PRN     Review of patient's allergies indicates:   Allergen Reactions    Lisinopril     Losartan      Intolerance- elevates potassium level     Objective:     Vital Signs (24h Range):  Temp:  [97.1  °F (36.2 °C)-98.6 °F (37 °C)] 97.8 °F (36.6 °C)  Pulse:  [53-79] 58  Resp:  [16-20] 20  SpO2:  [93 %-100 %] 100 %  BP: (133-188)/() 133/58       Lines/Drains/Airways       Peripheral Intravenous Line  Duration                  Peripheral IV - Single Lumen 08/05/24 0701 20 G Anterior;Left Forearm 1 day         Peripheral IV - Single Lumen 08/06/24 1223 20 G Posterior;Right Hand <1 day                     Physical Exam  NAD  Awake and alert  Nose w/right merocel; left rhino rocket inflated, no anterior oozing or bloody crusting  OP without any clot   Normal WOB, no stridor or stertor

## 2024-08-07 NOTE — PLAN OF CARE
AAOX4. VS stable - bradycardic. Hypertensive in PACU, PRN PO hydralazine administered with decrease in BP. PO ABX given. Dressing remained clean, dry, intact throughout shift. Rhino rockets remained in place with no bleed through noted. Pt ambulated in room with 1-person assistance without issue. No adverse events noted during this shift. No new complaints reported.     Problem: Adult Inpatient Plan of Care  Goal: Plan of Care Review  Outcome: Progressing  Flowsheets (Taken 8/7/2024 0516)  Plan of Care Reviewed With:   patient   spouse  Goal: Patient-Specific Goal (Individualized)  Outcome: Progressing  Goal: Absence of Hospital-Acquired Illness or Injury  Outcome: Progressing  Intervention: Prevent Infection  Flowsheets (Taken 8/7/2024 0516)  Infection Prevention:   environmental surveillance performed   hand hygiene promoted   personal protective equipment utilized   rest/sleep promoted   single patient room provided  Goal: Optimal Comfort and Wellbeing  Outcome: Progressing  Intervention: Monitor Pain and Promote Comfort  Flowsheets (Taken 8/7/2024 0516)  Pain Management Interventions:   care clustered   pain management plan reviewed with patient/caregiver   medication offered but refused   quiet environment facilitated   relaxation techniques promoted   warm blanket provided   position adjusted  Goal: Readiness for Transition of Care  Outcome: Progressing     Problem: Infection  Goal: Absence of Infection Signs and Symptoms  Outcome: Progressing     Problem: Fall Injury Risk  Goal: Absence of Fall and Fall-Related Injury  Outcome: Progressing  Intervention: Promote Injury-Free Environment  Flowsheets (Taken 8/7/2024 0516)  Safety Promotion/Fall Prevention:   assistive device/personal item within reach   Fall Risk reviewed with patient/family   medications reviewed   nonskid shoes/socks when out of bed   instructed to call staff for mobility   room near unit station   side rails raised x 2     Problem:  Wound  Goal: Optimal Coping  Outcome: Progressing  Intervention: Support Patient and Family Response  Flowsheets (Taken 8/7/2024 0516)  Supportive Measures:   active listening utilized   relaxation techniques promoted   self-care encouraged   verbalization of feelings encouraged  Family/Support System Care: self-care encouraged  Goal: Optimal Functional Ability  Outcome: Progressing  Intervention: Optimize Functional Ability  Flowsheets (Taken 8/7/2024 0516)  Activity Management: Ambulated in room - L4  Activity Assistance Provided: assistance, 1 person  Goal: Optimal Wound Healing  Outcome: Progressing  Intervention: Promote Wound Healing  Flowsheets (Taken 8/7/2024 0516)  Sleep/Rest Enhancement:   awakenings minimized   noise level reduced   regular sleep/rest pattern promoted   relaxation techniques promoted   room darkened

## 2024-08-07 NOTE — SUBJECTIVE & OBJECTIVE
Interval History: No acute events overnight. Denies chest pain, SOB, n/v, c/d. Patient reports some mild bloody drainage from nostrils while eating but otherwise no signs of bleeding. He reports he is feeling good.         Review of Systems  Objective:     Vital Signs (Most Recent):  Temp: 97.6 °F (36.4 °C) (08/07/24 0741)  Pulse: 63 (08/07/24 0741)  Resp: 18 (08/07/24 0741)  BP: 133/62 (08/07/24 0741)  SpO2: 98 % (08/07/24 1015) Vital Signs (24h Range):  Temp:  [97.1 °F (36.2 °C)-98.6 °F (37 °C)] 97.6 °F (36.4 °C)  Pulse:  [53-79] 63  Resp:  [16-20] 18  SpO2:  [93 %-100 %] 98 %  BP: (133-188)/(58-77) 133/62     Weight: 69.3 kg (152 lb 12.5 oz)  Body mass index is 25.42 kg/m².    Intake/Output Summary (Last 24 hours) at 8/7/2024 1043  Last data filed at 8/7/2024 0629  Gross per 24 hour   Intake 1613 ml   Output 750 ml   Net 863 ml         Physical Exam  Vitals and nursing note reviewed.   Constitutional:       General: He is not in acute distress.     Appearance: He is well-developed.   HENT:      Head: Normocephalic and atraumatic.      Nose:      Comments: Rhinorockets in place, bilateral     Mouth/Throat:      Pharynx: No oropharyngeal exudate.   Eyes:      Conjunctiva/sclera: Conjunctivae normal.      Pupils: Pupils are equal, round, and reactive to light.   Cardiovascular:      Rate and Rhythm: Normal rate and regular rhythm.      Heart sounds: Murmur heard.   Pulmonary:      Effort: Pulmonary effort is normal. No respiratory distress.      Breath sounds: Normal breath sounds. No wheezing.   Abdominal:      General: Bowel sounds are normal. There is no distension.      Palpations: Abdomen is soft.      Tenderness: There is no abdominal tenderness.   Musculoskeletal:         General: No tenderness. Normal range of motion.      Cervical back: Normal range of motion and neck supple.   Lymphadenopathy:      Cervical: No cervical adenopathy.   Skin:     General: Skin is warm and dry.      Capillary Refill: Capillary  refill takes less than 2 seconds.      Findings: No rash.   Neurological:      Mental Status: He is alert and oriented to person, place, and time.      Cranial Nerves: No cranial nerve deficit.      Sensory: No sensory deficit.      Coordination: Coordination normal.   Psychiatric:         Behavior: Behavior normal.         Thought Content: Thought content normal.         Judgment: Judgment normal.             Significant Labs: All pertinent labs within the past 24 hours have been reviewed.  CBC:   Recent Labs   Lab 08/06/24 0223 08/07/24 0324   WBC 5.26 3.63*   HGB 6.7* 8.0*   HCT 22.7* 26.2*   * 139*     CMP:   Recent Labs   Lab 08/06/24 0223 08/07/24 0324    138   K 4.4 4.7   * 113*   CO2 19* 14*   GLU 74 111*   BUN 49* 41*   CREATININE 1.8* 1.7*   CALCIUM 9.4 9.4   ANIONGAP 8 11       Significant Imaging: I have reviewed all pertinent imaging results/findings within the past 24 hours.

## 2024-08-07 NOTE — ASSESSMENT & PLAN NOTE
Patient with Paroxysmal (<7 days) atrial fibrillation which is controlled currently with Beta Blocker. Patient is currently in sinus rhythm.[id number] Score: 4.  Anticoagulation indicated. Anticoagulation done with coumadin .

## 2024-08-07 NOTE — CONSULTS
Hospital Medicine Pharmacy Consult Note    PharmD Consult Received For:     Pharmacy to review dose of warfarin  Dariel Urbina is a 84 y.o. male on warfarin therapy for Mechanical Heart Valve. PharmD has been consulted for warfarin dosing.    Current order: none  Home dose: 2.5 mg (5 mg x 0.5) every Mon, Fri; 5 mg (5 mg x 1) all other days   Coumadin clinic enrollment: Active  INR goal: 2-3    Lab Results   Component Value Date    INR 2.3 (H) 08/07/2024    INR 2.4 (H) 08/06/2024    INR 2.8 (H) 08/05/2024     Significant drug interactions: allopurinol- medication is home medication and stable on home warfarin  Diet:  Diet low sodium       Recommendation(s):   Patient admitted with epistaxis, but planned to restart warfarin tonight  Recommend resuming home warfarin dose; 8/7 warfarin 5 mg POx1  Daily INR  Pharmacy will continue to follow and monitor warfarin    Thank you for the consult,  Payton Leavitt  Extension [phone]    **Note: Consults are reviewed Monday-Friday 7:00am-3:30pm. The above recommendations are only suggested. The recommendations should be considered in conjunction with all patient factors.**

## 2024-08-07 NOTE — ASSESSMENT & PLAN NOTE
Anemia is likely due to acute blood loss which was from epistaxis . Most recent hemoglobin and hematocrit are listed below.  Recent Labs     08/05/24  0409 08/06/24  0223 08/07/24  0324   HGB 6.4* 6.7* 8.0*   HCT 21.5* 22.7* 26.2*       Plan  - Monitor serial CBC: Daily  - Transfuse PRBC if patient becomes hemodynamically unstable, symptomatic or H/H drops below 7/21.  - Patient has received 2 units of PRBCs on 8/5, 8/6  - resuming warfarin on 8/7   No

## 2024-08-07 NOTE — PLAN OF CARE
Problem: Adult Inpatient Plan of Care  Goal: Absence of Hospital-Acquired Illness or Injury  Outcome: Progressing  Intervention: Identify and Manage Fall Risk  Flowsheets (Taken 8/7/2024 1608)  Safety Promotion/Fall Prevention:   assistive device/personal item within reach   commode/urinal/bedpan at bedside   diversional activities provided   Fall Risk reviewed with patient/family   Fall Risk signage in place   family expresses understanding of fall risk and prevention   family to remain at bedside   instructed to call staff for mobility   in recliner, wheels locked   high risk medications identified   lighting adjusted   medications reviewed   muscle strengthening facilitated   nonskid shoes/socks when out of bed   patient expresses understanding of fall risk and prevention   pulse ox   side rails raised x 2   room near unit station  Intervention: Prevent Skin Injury  Flowsheets (Taken 8/7/2024 1608)  Body Position: position changed independently  Skin Protection:   incontinence pads utilized   pulse oximeter probe site changed  Device Skin Pressure Protection:   absorbent pad utilized/changed   adhesive use limited  Intervention: Prevent and Manage VTE (Venous Thromboembolism) Risk  Flowsheets (Taken 8/7/2024 1608)  VTE Prevention/Management:   remove, assess skin, and reapply sequential compression device   ambulation promoted   bleeding precations maintained   bleeding risk assessed   bleeding risk factor(s) identified, provider notified   remove, assess skin, and reapply compression stockings   dorsiflexion/plantar flexion performed   ROM (active) performed  Intervention: Prevent Infection  Flowsheets (Taken 8/7/2024 1608)  Infection Prevention:   cohorting utilized   environmental surveillance performed   equipment surfaces disinfected   hand hygiene promoted   single patient room provided   rest/sleep promoted  Goal: Optimal Comfort and Wellbeing  Outcome: Progressing  Intervention: Monitor Pain and Promote  Comfort  Flowsheets (Taken 8/7/2024 1608)  Pain Management Interventions:   around-the-clock dosing utilized   awakened for pain meds per patient request   cold applied   care clustered   diversional activity provided   medication offered   pain management plan reviewed with patient/caregiver   pillow support provided   position adjusted   relaxation techniques promoted   quiet environment facilitated   prescribed exercises encouraged   premedicated for activity   warm blanket provided  Intervention: Provide Person-Centered Care  Flowsheets (Taken 8/7/2024 1608)  Trust Relationship/Rapport:   care explained   questions encouraged   reassurance provided   choices provided   emotional support provided   thoughts/feelings acknowledged   empathic listening provided   questions answered     Problem: Infection  Goal: Absence of Infection Signs and Symptoms  Outcome: Progressing  Intervention: Prevent or Manage Infection  Flowsheets (Taken 8/7/2024 1608)  Fever Reduction/Comfort Measures:   humidification temperature decreased   lightweight bedding   lightweight clothing   fluid intake increased  Infection Management: aseptic technique maintained     Problem: Diabetes Comorbidity  Goal: Blood Glucose Level Within Targeted Range  Outcome: Progressing  Intervention: Monitor and Manage Glycemia  Flowsheets (Taken 8/7/2024 1608)  Glycemic Management:   blood glucose monitored   supplemental insulin given   oral hydration promoted     Problem: Acute Kidney Injury/Impairment  Goal: Improved Oral Intake  Outcome: Progressing  Intervention: Promote and Optimize Oral Intake  Flowsheets (Taken 8/7/2024 1608)  Oral Nutrition Promotion:   adaptive equipment use encouraged   physical activity promoted   rest periods promoted   social interaction promoted   nutrition counseling provided   calorie-dense liquids provided  Nutrition Interventions:   meals from home/family encouraged   supplemental foods provided   referred to nutrition  assistant/tech   supplemental drinks provided   meal set-up provided   frequent small meals provided   referred to dietitian   diet advanced   food preferences provided   diet liberalized  Goal: Effective Renal Function  Outcome: Progressing  Intervention: Monitor and Support Renal Function  Flowsheets (Taken 8/7/2024 1608)  Stabilization Measures:   verbal stimulation provided   provider notified   tactile stimulation provided  Medication Review/Management:   medications reviewed   high-risk medications identified   provider consulted   pharmacy consulted     Problem: Fall Injury Risk  Goal: Absence of Fall and Fall-Related Injury  Outcome: Progressing  Intervention: Identify and Manage Contributors  Flowsheets (Taken 8/7/2024 1608)  Self-Care Promotion:   independence encouraged   adaptive equipment use encouraged   BADL personal objects within reach   BADL personal routines maintained   meal set-up provided  Medication Review/Management:   medications reviewed   high-risk medications identified   provider consulted   pharmacy consulted  Intervention: Promote Injury-Free Environment  Flowsheets (Taken 8/7/2024 1608)  Safety Promotion/Fall Prevention:   assistive device/personal item within reach   commode/urinal/bedpan at bedside   diversional activities provided   Fall Risk reviewed with patient/family   Fall Risk signage in place   family expresses understanding of fall risk and prevention   family to remain at bedside   instructed to call staff for mobility   in recliner, wheels locked   high risk medications identified   lighting adjusted   medications reviewed   muscle strengthening facilitated   nonskid shoes/socks when out of bed   patient expresses understanding of fall risk and prevention   pulse ox   side rails raised x 2   room near unit station     Problem: Wound  Goal: Skin Health and Integrity  Outcome: Progressing  Intervention: Optimize Skin Protection  Flowsheets (Taken 8/7/2024 1608)  Pressure  Reduction Techniques:   frequent weight shift encouraged   heels elevated off bed   positioned off wounds   pressure points protected  Skin Protection:   incontinence pads utilized   pulse oximeter probe site changed  Activity Management: Ambulated in room - L4  Head of Bed (HOB) Positioning: HOB elevated  Goal: Optimal Wound Healing  Outcome: Progressing  Intervention: Promote Wound Healing  Flowsheets (Taken 8/7/2024 5544)  Sleep/Rest Enhancement:   awakenings minimized   consistent schedule promoted   noise level reduced   room darkened   relaxation techniques promoted   natural light exposure provided   therapeutic touch utilized   reading promoted   regular sleep/rest pattern promoted   family presence promoted

## 2024-08-07 NOTE — NURSING TRANSFER
Nursing Transfer Note      8/6/2024   7:52 PM    Nurse giving handoff:Chitra CARR PACU RN  Nurse receiving handoff:Phi ([address] [place])    Reason patient is being transferred: post op    Transfer To: [facility]    Transfer via stretcher    Transfer with cardiac monitoring    Transported by RN    Transfer Vital Signs: see flowsheet    Telemetry: no  Order for Tele Monitor? No    Additional Lines: no    4eyes on Skin: yes    Medicines sent: none    Any special needs or follow-up needed: no    Patient belongings transferred with patient: No    Chart send with patient: Yes    Notified: spouse    Patient reassessed at: 8/6/2024 5614

## 2024-08-07 NOTE — ASSESSMENT & PLAN NOTE
On coumadin for mechanical heart valve  - daily INR  - pharmacy consulted  - restarting warfarin 8/7

## 2024-08-07 NOTE — ASSESSMENT & PLAN NOTE
Dariel Urbina is a 84 y.o. male evaluated for Epistaxis; Acute blood loss anemia; Bleeding; Chest pain on 8/5/2024. Well known to the ENT service for recurrent epistaxis requiring surgical intervention. 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 Previous surgical hx for control of epistaxis with bilateral SP ligation followed by embolization, bilateral facial and internal maxillary artery embolization on 11/03/2023, 12/18/23, left AEA ligation on 2/28/24, and bilateral endoscopic control/cauterization in OR on 6/18/24.     Now with left rhinorocket placed by ED, right merocel placed by ENT. Receiving 1u pRBC given Hgb 6.4. INR therapeutic at 2.8.  Admitted to hospital medicine for obs with packing in place and assist to optimize patient as appropriate prior to surgery   Discussed with staff Dr. Russell, given patient's extensive history of recurrent bleeds will plan for operative intervention Thursday following IR procedure to evaluate nasal cavities - booked for nasal endoscopy with control of nasal hemorrhage.    S/p IR embolization of right maxillary, 2 left maxillary collaterals, and a dominant left facial. Plan for OR tomorrow       -- Consult IR for angio possible embolization in light of various interventions including previous embolizations and ligations -- once completed will plan for nasal endoscopy    -- IR  procedure 8/7 with embolization of right maxillary, 2 left maxillary collaterals, and a dominant left facial   -- Plan for OR 8/8/24 for nasal endoscopy with control of hemorrhage/cauterization    - please keep NPO at midnight prior to procedure planned for 8/8   - consent obtained, in room/chart and in media tab   - case request placed   -- Please cont anti-staph antibiotics while packing is in place  -- Nasal packing instructions  - Afrin to bilateral nares q8h for the next 48h  - May place mustache dressing under nose for trickling, ok if dressing becomes saturated with some red/brown  drainage  - Nasal saline q4h while awake to bilateral packing to keep moist; with nightly application of vaseline/aquaphor to anterior nares  - Keep head of bed elevated  -- Remainder of care per medicine  -- Please page ENT on call with any additional questions or concerns

## 2024-08-07 NOTE — PROGRESS NOTES
[facility] - Med Surg ([place] [id number])  Otorhinolaryngology-Head & Neck Surgery  Progress Note    Subjective:     Post-Op Info:  Procedure(s) (LRB):  CONTROL OF EPISTAXIS, POSTERIOR, USING NASAL PACKING OR CAUTERIZATION (Bilateral)  ENDOSCOPY, NOSE (N/A)      Hospital Day: 3     Interval History: No oozing after IR procedure. Notes nasal passages feel dry. Planning for OR tomorrow for nasal endoscopy.     Medications:  Continuous Infusions:  Scheduled Meds:   allopurinoL  100 mg Oral Daily    bumetanide  2 mg Oral Every other day    cephALEXin  500 mg Oral Q8H    ferrous gluconate  324 mg Oral Daily with breakfast    isosorbide mononitrate  60 mg Oral QHS    metoprolol succinate  12.5 mg Oral Daily    oxymetazoline  2 spray Each Nostril Q8H    sodium chloride  2 spray Each Nostril Q4H     PRN Meds:  Current Facility-Administered Medications:     0.9%  NaCl infusion (for blood administration), , Intravenous, Q24H PRN    0.9%  NaCl infusion (for blood administration), , Intravenous, Q24H PRN    acetaminophen, 1,000 mg, Oral, Q8H PRN    acetaminophen, 650 mg, Oral, Q4H PRN    albuterol-ipratropium, 3 mL, Nebulization, Q4H PRN    aluminum-magnesium hydroxide-simethicone, 30 mL, Oral, QID PRN    bisacodyL, 10 mg, Rectal, Daily PRN    glucagon (human recombinant), 1 mg, Intramuscular, PRN    glucagon (human recombinant), 1 mg, Intramuscular, PRN    glucose, 16 g, Oral, PRN    glucose, 24 g, Oral, PRN    haloperidol lactate, 0.5 mg, Intravenous, Q10 Min PRN    hydrALAZINE, 25 mg, Oral, Q6H PRN    HYDROmorphone, 0.2 mg, Intravenous, Q5 Min PRN    HYDROmorphone, 0.2 mg, Intravenous, Q5 Min PRN    melatonin, 6 mg, Oral, Nightly PRN    naloxone, 0.02 mg, Intravenous, PRN    ondansetron, 8 mg, Oral, Q8H PRN    ondansetron, 4 mg, Intravenous, Daily PRN    oxyCODONE, 5 mg, Oral, Q3H PRN    prochlorperazine, 5 mg, Intravenous, Q6H PRN    simethicone, 1 tablet, Oral, QID PRN    sodium chloride, 1 spray, Each Nostril, PRN    sodium  chloride 0.9%, 10 mL, Intravenous, PRN    sodium chloride 0.9%, 5 mL, Intravenous, PRN     Review of patient's allergies indicates:   Allergen Reactions    Lisinopril     Losartan      Intolerance- elevates potassium level     Objective:     Vital Signs (24h Range):  Temp:  [97.1 °F (36.2 °C)-98.6 °F (37 °C)] 97.8 °F (36.6 °C)  Pulse:  [53-79] 58  Resp:  [16-20] 20  SpO2:  [93 %-100 %] 100 %  BP: (133-188)/() 133/58       Lines/Drains/Airways       Peripheral Intravenous Line  Duration                  Peripheral IV - Single Lumen 08/05/24 0701 20 G Anterior;Left Forearm 1 day         Peripheral IV - Single Lumen 08/06/24 1223 20 G Posterior;Right Hand <1 day                     Physical Exam  NAD  Awake and alert  Nose w/right merocel; left rhino rocket inflated, no anterior oozing or bloody crusting  OP without any clot   Normal WOB, no stridor or stertor    Assessment/Plan:     * Recurrent epistaxis  Dariel Urbina is a 84 y.o. male evaluated for Epistaxis; Acute blood loss anemia; Bleeding; Chest pain on 8/5/2024. Well known to the ENT service for recurrent epistaxis requiring surgical intervention. 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 Previous surgical hx for control of epistaxis with bilateral SP ligation followed by embolization, bilateral facial and internal maxillary artery embolization on 11/03/2023, 12/18/23, left AEA ligation on 2/28/24, and bilateral endoscopic control/cauterization in OR on 6/18/24.     Now with left rhinorocket placed by ED, right merocel placed by ENT. Receiving 1u pRBC given Hgb 6.4. INR therapeutic at 2.8.  Admitted to hospital medicine for obs with packing in place and assist to optimize patient as appropriate prior to surgery   Discussed with staff Dr. Russell, given patient's extensive history of recurrent bleeds will plan for operative intervention Thursday following IR procedure to evaluate nasal cavities - booked for nasal endoscopy with control of nasal  hemorrhage.    S/p IR embolization of right maxillary, 2 left maxillary collaterals, and a dominant left facial. Plan for OR tomorrow       -- Consult IR for angio possible embolization in light of various interventions including previous embolizations and ligations -- once completed will plan for nasal endoscopy    -- IR  procedure 8/7 with embolization of right maxillary, 2 left maxillary collaterals, and a dominant left facial   -- Plan for OR 8/8/24 for nasal endoscopy with control of hemorrhage/cauterization    - please keep NPO at midnight prior to procedure planned for 8/8   - consent obtained, in room/chart and in media tab   - case request placed   -- Please cont anti-staph antibiotics while packing is in place  -- Nasal packing instructions  - Afrin to bilateral nares q8h for the next 48h  - May place mustache dressing under nose for trickling, ok if dressing becomes saturated with some red/brown drainage  - Nasal saline q4h while awake to bilateral packing to keep moist; with nightly application of vaseline/aquaphor to anterior nares  - Keep head of bed elevated  -- Remainder of care per medicine  -- Please page ENT on call with any additional questions or concerns             Yesenia Cross MD  Otorhinolaryngology-Head & Neck Surgery  [facility] - Med Surg ([facility])

## 2024-08-07 NOTE — ASSESSMENT & PLAN NOTE
Creatine stable for now. BMP reviewed- noted Estimated Creatinine Clearance: 28.6 mL/min (A) (based on SCr of 1.7 mg/dL (H)). according to latest data. 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  Monitor UOP and serial BMP and adjust therapy as needed. Renally dose meds. Avoid nephrotoxic medications and procedures.  - low dose SSI, ACHS  - diabetic diet

## 2024-08-07 NOTE — ASSESSMENT & PLAN NOTE
+ Acute blood loss anemia  Patient presents with recurrent epistaxis, follows closely outpatient with ENT. Hgb 6.4 on presentation. Given 1u pRBC on 6/5. Second unit on 8/6. S/p IR embolization of the right maxillary, 2 left maxillary collaterals, and a dominant left facial arteries on 8/7. Patient's hgb stable on 8/7. Okay to restart warfarin, pharmacy managing. Plan for OR with ENT 8/8 for nasal endoscopy.   - trend CBC  - Afrin q8h bilateral nares + q4h saline spray to packing  - Keflex for staph coverage while packing in place  - ENT consulted > procedure planned 8/8  - HOB elevated

## 2024-08-08 LAB
ANION GAP SERPL CALC-SCNC: 9 MMOL/L (ref 8–16)
BASOPHILS # BLD AUTO: 0.01 K/UL (ref 0–0.2)
BASOPHILS NFR BLD: 0.1 % (ref 0–1.9)
BUN SERPL-MCNC: 47 MG/DL (ref 8–23)
CALCIUM SERPL-MCNC: 9.9 MG/DL (ref 8.7–10.5)
CHLORIDE SERPL-SCNC: 108 MMOL/L (ref 95–110)
CO2 SERPL-SCNC: 19 MMOL/L (ref 23–29)
CREAT SERPL-MCNC: 2 MG/DL (ref 0.5–1.4)
DIFFERENTIAL METHOD BLD: ABNORMAL
EOSINOPHIL # BLD AUTO: 0 K/UL (ref 0–0.5)
EOSINOPHIL NFR BLD: 0.6 % (ref 0–8)
ERYTHROCYTE [DISTWIDTH] IN BLOOD BY AUTOMATED COUNT: 17.6 % (ref 11.5–14.5)
EST. GFR  (NO RACE VARIABLE): 32.5 ML/MIN/1.73 M^2
GLUCOSE SERPL-MCNC: 82 MG/DL (ref 70–110)
HCT VFR BLD AUTO: 24.5 % (ref 40–54)
HGB BLD-MCNC: 7.6 G/DL (ref 14–18)
IMM GRANULOCYTES # BLD AUTO: 0.03 K/UL (ref 0–0.04)
IMM GRANULOCYTES NFR BLD AUTO: 0.4 % (ref 0–0.5)
INR PPP: 2.6 (ref 0.8–1.2)
LYMPHOCYTES # BLD AUTO: 0.7 K/UL (ref 1–4.8)
LYMPHOCYTES NFR BLD: 10 % (ref 18–48)
MCH RBC QN AUTO: 29.9 PG (ref 27–31)
MCHC RBC AUTO-ENTMCNC: 31 G/DL (ref 32–36)
MCV RBC AUTO: 97 FL (ref 82–98)
MONOCYTES # BLD AUTO: 0.5 K/UL (ref 0.3–1)
MONOCYTES NFR BLD: 7.1 % (ref 4–15)
NEUTROPHILS # BLD AUTO: 5.8 K/UL (ref 1.8–7.7)
NEUTROPHILS NFR BLD: 81.8 % (ref 38–73)
NRBC BLD-RTO: 0 /100 WBC
PLATELET # BLD AUTO: 149 K/UL (ref 150–450)
PMV BLD AUTO: 10.8 FL (ref 9.2–12.9)
POCT GLUCOSE: 75 MG/DL (ref 70–110)
POTASSIUM SERPL-SCNC: 4.5 MMOL/L (ref 3.5–5.1)
PROTHROMBIN TIME: 26.8 SEC (ref 9–12.5)
RBC # BLD AUTO: 2.54 M/UL (ref 4.6–6.2)
SODIUM SERPL-SCNC: 136 MMOL/L (ref 136–145)
WBC # BLD AUTO: 7.08 K/UL (ref 3.9–12.7)

## 2024-08-08 PROCEDURE — 36000707: Mod: HCNC | Performed by: STUDENT IN AN ORGANIZED HEALTH CARE EDUCATION/TRAINING PROGRAM

## 2024-08-08 PROCEDURE — 25000003 PHARM REV CODE 250: Mod: HCNC

## 2024-08-08 PROCEDURE — 25000003 PHARM REV CODE 250: Mod: HCNC | Performed by: PHYSICIAN ASSISTANT

## 2024-08-08 PROCEDURE — 63600175 PHARM REV CODE 636 W HCPCS: Mod: HCNC

## 2024-08-08 PROCEDURE — 36000706: Mod: HCNC | Performed by: STUDENT IN AN ORGANIZED HEALTH CARE EDUCATION/TRAINING PROGRAM

## 2024-08-08 PROCEDURE — 37000008 HC ANESTHESIA 1ST 15 MINUTES: Mod: HCNC | Performed by: STUDENT IN AN ORGANIZED HEALTH CARE EDUCATION/TRAINING PROGRAM

## 2024-08-08 PROCEDURE — 82962 GLUCOSE BLOOD TEST: CPT | Mod: HCNC | Performed by: STUDENT IN AN ORGANIZED HEALTH CARE EDUCATION/TRAINING PROGRAM

## 2024-08-08 PROCEDURE — 25000003 PHARM REV CODE 250: Mod: HCNC | Performed by: HOSPITALIST

## 2024-08-08 PROCEDURE — 85025 COMPLETE CBC W/AUTO DIFF WBC: CPT | Mod: HCNC | Performed by: STUDENT IN AN ORGANIZED HEALTH CARE EDUCATION/TRAINING PROGRAM

## 2024-08-08 PROCEDURE — 093K8ZZ CONTROL BLEEDING IN NASAL MUCOSA AND SOFT TISSUE, VIA NATURAL OR ARTIFICIAL OPENING ENDOSCOPIC: ICD-10-PCS | Performed by: STUDENT IN AN ORGANIZED HEALTH CARE EDUCATION/TRAINING PROGRAM

## 2024-08-08 PROCEDURE — 85610 PROTHROMBIN TIME: CPT | Mod: HCNC | Performed by: STUDENT IN AN ORGANIZED HEALTH CARE EDUCATION/TRAINING PROGRAM

## 2024-08-08 PROCEDURE — 71000033 HC RECOVERY, INTIAL HOUR: Mod: HCNC | Performed by: STUDENT IN AN ORGANIZED HEALTH CARE EDUCATION/TRAINING PROGRAM

## 2024-08-08 PROCEDURE — 25000003 PHARM REV CODE 250: Mod: HCNC | Performed by: STUDENT IN AN ORGANIZED HEALTH CARE EDUCATION/TRAINING PROGRAM

## 2024-08-08 PROCEDURE — 37000009 HC ANESTHESIA EA ADD 15 MINS: Mod: HCNC | Performed by: STUDENT IN AN ORGANIZED HEALTH CARE EDUCATION/TRAINING PROGRAM

## 2024-08-08 PROCEDURE — 71000015 HC POSTOP RECOV 1ST HR: Mod: HCNC | Performed by: STUDENT IN AN ORGANIZED HEALTH CARE EDUCATION/TRAINING PROGRAM

## 2024-08-08 PROCEDURE — 30905 CONTROL OF NOSEBLEED: CPT | Mod: HCNC,,, | Performed by: STUDENT IN AN ORGANIZED HEALTH CARE EDUCATION/TRAINING PROGRAM

## 2024-08-08 PROCEDURE — 94761 N-INVAS EAR/PLS OXIMETRY MLT: CPT | Mod: HCNC

## 2024-08-08 PROCEDURE — 21400001 HC TELEMETRY ROOM: Mod: HCNC

## 2024-08-08 PROCEDURE — 80048 BASIC METABOLIC PNL TOTAL CA: CPT | Mod: HCNC | Performed by: STUDENT IN AN ORGANIZED HEALTH CARE EDUCATION/TRAINING PROGRAM

## 2024-08-08 PROCEDURE — 63600175 PHARM REV CODE 636 W HCPCS: Mod: HCNC | Performed by: STUDENT IN AN ORGANIZED HEALTH CARE EDUCATION/TRAINING PROGRAM

## 2024-08-08 PROCEDURE — 36415 COLL VENOUS BLD VENIPUNCTURE: CPT | Mod: HCNC | Performed by: STUDENT IN AN ORGANIZED HEALTH CARE EDUCATION/TRAINING PROGRAM

## 2024-08-08 RX ORDER — DEXAMETHASONE SODIUM PHOSPHATE 4 MG/ML
INJECTION, SOLUTION INTRA-ARTICULAR; INTRALESIONAL; INTRAMUSCULAR; INTRAVENOUS; SOFT TISSUE
Status: DISCONTINUED | OUTPATIENT
Start: 2024-08-08 | End: 2024-08-08

## 2024-08-08 RX ORDER — EPINEPHRINE 1 MG/ML
INJECTION, SOLUTION, CONCENTRATE INTRAVENOUS
Status: DISCONTINUED | OUTPATIENT
Start: 2024-08-08 | End: 2024-08-08 | Stop reason: HOSPADM

## 2024-08-08 RX ORDER — DEXMEDETOMIDINE HYDROCHLORIDE 100 UG/ML
INJECTION, SOLUTION INTRAVENOUS
Status: DISCONTINUED | OUTPATIENT
Start: 2024-08-08 | End: 2024-08-08

## 2024-08-08 RX ORDER — ONDANSETRON HYDROCHLORIDE 2 MG/ML
INJECTION, SOLUTION INTRAVENOUS
Status: DISCONTINUED | OUTPATIENT
Start: 2024-08-08 | End: 2024-08-08

## 2024-08-08 RX ORDER — HALOPERIDOL 5 MG/ML
0.5 INJECTION INTRAMUSCULAR EVERY 10 MIN PRN
Status: DISCONTINUED | OUTPATIENT
Start: 2024-08-08 | End: 2024-08-08 | Stop reason: HOSPADM

## 2024-08-08 RX ORDER — GLUCAGON 1 MG
1 KIT INJECTION
Status: DISCONTINUED | OUTPATIENT
Start: 2024-08-08 | End: 2024-08-08 | Stop reason: HOSPADM

## 2024-08-08 RX ORDER — LIDOCAINE HYDROCHLORIDE 20 MG/ML
INJECTION, SOLUTION EPIDURAL; INFILTRATION; INTRACAUDAL; PERINEURAL
Status: DISCONTINUED | OUTPATIENT
Start: 2024-08-08 | End: 2024-08-08

## 2024-08-08 RX ORDER — WARFARIN 2.5 MG/1
5 TABLET ORAL
Status: DISCONTINUED | OUTPATIENT
Start: 2024-08-14 | End: 2024-08-09 | Stop reason: HOSPADM

## 2024-08-08 RX ORDER — OXYMETAZOLINE HCL 0.05 %
SPRAY, NON-AEROSOL (ML) NASAL
Status: DISCONTINUED | OUTPATIENT
Start: 2024-08-08 | End: 2024-08-08 | Stop reason: HOSPADM

## 2024-08-08 RX ORDER — WARFARIN 2.5 MG/1
5 TABLET ORAL
Status: DISCONTINUED | OUTPATIENT
Start: 2024-08-08 | End: 2024-08-09 | Stop reason: HOSPADM

## 2024-08-08 RX ORDER — PROPOFOL 10 MG/ML
VIAL (ML) INTRAVENOUS
Status: DISCONTINUED | OUTPATIENT
Start: 2024-08-08 | End: 2024-08-08

## 2024-08-08 RX ORDER — HYDROMORPHONE HYDROCHLORIDE 1 MG/ML
0.2 INJECTION, SOLUTION INTRAMUSCULAR; INTRAVENOUS; SUBCUTANEOUS EVERY 5 MIN PRN
Status: DISCONTINUED | OUTPATIENT
Start: 2024-08-08 | End: 2024-08-08 | Stop reason: HOSPADM

## 2024-08-08 RX ORDER — WARFARIN 2.5 MG/1
2.5 TABLET ORAL
Status: DISCONTINUED | OUTPATIENT
Start: 2024-08-09 | End: 2024-08-09 | Stop reason: HOSPADM

## 2024-08-08 RX ORDER — FENTANYL CITRATE 50 UG/ML
INJECTION, SOLUTION INTRAMUSCULAR; INTRAVENOUS
Status: DISCONTINUED | OUTPATIENT
Start: 2024-08-08 | End: 2024-08-08

## 2024-08-08 RX ORDER — SODIUM CHLORIDE 0.9 % (FLUSH) 0.9 %
10 SYRINGE (ML) INJECTION
Status: DISCONTINUED | OUTPATIENT
Start: 2024-08-08 | End: 2024-08-08 | Stop reason: HOSPADM

## 2024-08-08 RX ORDER — SUCCINYLCHOLINE CHLORIDE 20 MG/ML
INJECTION INTRAMUSCULAR; INTRAVENOUS
Status: DISCONTINUED | OUTPATIENT
Start: 2024-08-08 | End: 2024-08-08

## 2024-08-08 RX ORDER — ROCURONIUM BROMIDE 10 MG/ML
INJECTION, SOLUTION INTRAVENOUS
Status: DISCONTINUED | OUTPATIENT
Start: 2024-08-08 | End: 2024-08-08

## 2024-08-08 RX ADMIN — CEPHALEXIN 500 MG: 500 CAPSULE ORAL at 09:08

## 2024-08-08 RX ADMIN — ISOSORBIDE MONONITRATE 60 MG: 60 TABLET, EXTENDED RELEASE ORAL at 09:08

## 2024-08-08 RX ADMIN — ONDANSETRON 4 MG: 2 INJECTION INTRAMUSCULAR; INTRAVENOUS at 10:08

## 2024-08-08 RX ADMIN — FENTANYL CITRATE 100 MCG: 50 INJECTION, SOLUTION INTRAMUSCULAR; INTRAVENOUS at 09:08

## 2024-08-08 RX ADMIN — DEXMEDETOMIDINE 4 MCG: 200 INJECTION, SOLUTION INTRAVENOUS at 10:08

## 2024-08-08 RX ADMIN — WARFARIN SODIUM 5 MG: 2.5 TABLET ORAL at 05:08

## 2024-08-08 RX ADMIN — PROPOFOL 160 MG: 10 INJECTION, EMULSION INTRAVENOUS at 09:08

## 2024-08-08 RX ADMIN — SUCCINYLCHOLINE CHLORIDE 80 MG: 20 INJECTION, SOLUTION INTRAMUSCULAR; INTRAVENOUS; PARENTERAL at 09:08

## 2024-08-08 RX ADMIN — CEPHALEXIN 500 MG: 500 CAPSULE ORAL at 05:08

## 2024-08-08 RX ADMIN — NASAL 2 SPRAY: 6.5 SPRAY NASAL at 05:08

## 2024-08-08 RX ADMIN — SODIUM CHLORIDE: 0.9 INJECTION, SOLUTION INTRAVENOUS at 09:08

## 2024-08-08 RX ADMIN — ROCURONIUM BROMIDE 10 MG: 10 INJECTION, SOLUTION INTRAVENOUS at 09:08

## 2024-08-08 RX ADMIN — CEPHALEXIN 500 MG: 500 CAPSULE ORAL at 02:08

## 2024-08-08 RX ADMIN — NASAL 2 SPRAY: 6.5 SPRAY NASAL at 02:08

## 2024-08-08 RX ADMIN — DEXAMETHASONE SODIUM PHOSPHATE 4 MG: 4 INJECTION INTRA-ARTICULAR; INTRALESIONAL; INTRAMUSCULAR; INTRAVENOUS; SOFT TISSUE at 10:08

## 2024-08-08 RX ADMIN — NASAL 2 SPRAY: 6.5 SPRAY NASAL at 09:08

## 2024-08-08 RX ADMIN — LIDOCAINE HYDROCHLORIDE 80 MG: 20 INJECTION, SOLUTION EPIDURAL; INFILTRATION; INTRACAUDAL at 09:08

## 2024-08-08 RX ADMIN — SUGAMMADEX 400 MG: 100 INJECTION, SOLUTION INTRAVENOUS at 10:08

## 2024-08-08 NOTE — ASSESSMENT & PLAN NOTE
On coumadin for mechanical heart valve  - daily INR  - pharmacy consulted  - restarting warfarin 8/7   patient with INR   Recent Labs   Lab 08/07/24  0324 08/08/24  0535 08/09/24  0517   INR 2.3* 2.6* 2.7*

## 2024-08-08 NOTE — PROGRESS NOTES
[facility] - Med Surg ([place] [address])  Otorhinolaryngology-Head & Neck Surgery  Progress Note    Subjective:     Post-Op Info:  Procedure(s) (LRB):  CONTROL OF EPISTAXIS, POSTERIOR, USING NASAL PACKING OR CAUTERIZATION (Bilateral)  ENDOSCOPY, NOSE (N/A)      Hospital Day: 4     Interval History: No epistaxis. OR today for nasal endoscopy    Medications:  Continuous Infusions:  Scheduled Meds:   allopurinoL  100 mg Oral Daily    bumetanide  2 mg Oral Every other day    cephALEXin  500 mg Oral Q8H    ferrous gluconate  324 mg Oral Daily with breakfast    isosorbide mononitrate  60 mg Oral QHS    metoprolol succinate  12.5 mg Oral Daily    polyethylene glycol  17 g Oral Daily    sodium chloride  2 spray Each Nostril Q4H     PRN Meds:  Current Facility-Administered Medications:     0.9%  NaCl infusion (for blood administration), , Intravenous, Q24H PRN    0.9%  NaCl infusion (for blood administration), , Intravenous, Q24H PRN    acetaminophen, 1,000 mg, Oral, Q8H PRN    acetaminophen, 650 mg, Oral, Q4H PRN    albuterol-ipratropium, 3 mL, Nebulization, Q4H PRN    aluminum-magnesium hydroxide-simethicone, 30 mL, Oral, QID PRN    bisacodyL, 10 mg, Rectal, Daily PRN    glucagon (human recombinant), 1 mg, Intramuscular, PRN    glucagon (human recombinant), 1 mg, Intramuscular, PRN    glucose, 16 g, Oral, PRN    glucose, 24 g, Oral, PRN    haloperidol lactate, 0.5 mg, Intravenous, Q10 Min PRN    hydrALAZINE, 25 mg, Oral, Q6H PRN    HYDROmorphone, 0.2 mg, Intravenous, Q5 Min PRN    HYDROmorphone, 0.2 mg, Intravenous, Q5 Min PRN    melatonin, 6 mg, Oral, Nightly PRN    naloxone, 0.02 mg, Intravenous, PRN    ondansetron, 8 mg, Oral, Q8H PRN    ondansetron, 4 mg, Intravenous, Daily PRN    oxyCODONE, 5 mg, Oral, Q3H PRN    prochlorperazine, 5 mg, Intravenous, Q6H PRN    simethicone, 1 tablet, Oral, QID PRN    sodium chloride, 1 spray, Each Nostril, PRN    sodium chloride 0.9%, 10 mL, Intravenous, PRN    sodium chloride 0.9%, 5 mL,  Intravenous, PRN     Review of patient's allergies indicates:   Allergen Reactions    Lisinopril     Losartan      Intolerance- elevates potassium level     Objective:     Vital Signs (24h Range):  Temp:  [97.4 °F (36.3 °C)-98.5 °F (36.9 °C)] 98.5 °F (36.9 °C)  Pulse:  [] 60  Resp:  [18] 18  SpO2:  [92 %-100 %] 92 %  BP: (112-150)/(49-68) 135/49       Lines/Drains/Airways       Peripheral Intravenous Line  Duration                  Peripheral IV - Single Lumen 08/05/24 0701 20 G Anterior;Left Forearm 3 days         Peripheral IV - Single Lumen 08/06/24 1223 20 G Posterior;Right Hand 1 day                     Physical Exam   NAD  Awake and alert  Nose w/right merocel; left rhino rocket inflated, no anterior oozing or bloody crusting  OP without any clot   Normal WOB, no stridor or stertor  Significant Labs:  BMP:   Recent Labs   Lab 08/08/24  0535   GLU 82      CO2 19*   BUN 47*   CREATININE 2.0*   CALCIUM 9.9     CBC:   Recent Labs   Lab 08/07/24  0324   WBC 3.63*   RBC 2.67*   HGB 8.0*   HCT 26.2*   *   MCV 98   MCH 30.0   MCHC 30.5*       Significant Diagnostics:  I have reviewed all pertinent imaging results/findings within the past 24 hours.  Assessment/Plan:     * Recurrent epistaxis  Dariel Urbina is a 84 y.o. male evaluated for Epistaxis; Acute blood loss anemia; Bleeding; Chest pain on 8/5/2024. Well known to the ENT service for recurrent epistaxis requiring surgical intervention. 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 Previous surgical hx for control of epistaxis with bilateral SP ligation followed by embolization, bilateral facial and internal maxillary artery embolization on 11/03/2023, 12/18/23, left AEA ligation on 2/28/24, and bilateral endoscopic control/cauterization in OR on 6/18/24.     Now with left rhinorocket placed by ED, right merocel placed by ENT. Receiving 1u pRBC given Hgb 6.4. INR therapeutic at 2.8.  Admitted to hospital medicine for obs with packing in place  and assist to optimize patient as appropriate prior to surgery     S/p IR embolization of right maxillary, 2 left maxillary collaterals, and a dominant left facial with IR 8/7.      -- Plan for OR today for nasal endoscopy with control of hemorrhage/cauterization    - NPO  -- Please cont anti-staph antibiotics while packing is in place  -- Nasal packing instructions  - Afrin to bilateral nares q8h for the next 48h  - May place mustache dressing under nose for trickling, ok if dressing becomes saturated with some red/brown drainage  - Nasal saline q4h while awake to bilateral packing to keep moist; with nightly application of vaseline/aquaphor to anterior nares  - Keep head of bed elevated  -- Remainder of care per medicine  -- Please page ENT on call with any additional questions or concerns             Manny Mccartney MD  Otorhinolaryngology-Head & Neck Surgery  [facility] - Med Surg ([facility]-16)

## 2024-08-08 NOTE — CONSULTS
Hospital Medicine Pharmacy Consult Note    PharmD Consult Received For:     Pharmacy to review dose of warfarin  Dariel Urbina is a 84 y.o. male on warfarin therapy for Mechanical Heart Valve(aortic). PharmD has been consulted for warfarin dosing.    Current order: warfarin 5 mg PO X1 8/7  Home dose: Warfarin  2.5 mg (5 mg x 0.5) every Mon, Fri; 5 mg (5 mg x 1) all other days   Coumadin clinic enrollment: Active  INR goal: 2-3    Lab Results   Component Value Date    INR 2.6 (H) 08/08/2024    INR 2.3 (H) 08/07/2024    INR 2.4 (H) 08/06/2024     Significant drug interactions: allopurinol  Diet:  Low sodium       Recommendation(s):     Recommend resuming home warfarin dose; Warfarin  2.5 mg (5 mg x 0.5) every Mon, Fri; 5 mg (5 mg x 1) all other days   If INR drops <2, will need to start heparin drip as patient has a mechanical valve.   Daily INR  Pharmacy will continue to follow and monitor warfarin    Thank you for the consult,  Payton Leavitt  Extension [phone]    **Note: Consults are reviewed Monday-Friday 7:00am-3:30pm. The above recommendations are only suggested. The recommendations should be considered in conjunction with all patient factors.**

## 2024-08-08 NOTE — ANESTHESIA PROCEDURE NOTES
Intubation    Date/Time: 8/8/2024 9:58 AM    Performed by: Sergei Winston MD  Authorized by: Sergei Winston MD    Intubation:     Induction:  Intravenous    Intubated:  Postinduction    Mask Ventilation:  Easy mask    Attempts:  1    Attempted By:  Resident anesthesiologist    Method of Intubation:  Video laryngoscopy    Blade:  Osorio 3    Laryngeal View Grade: Grade I - full view of cords      Difficult Airway Encountered?: No      Complications:  None    Airway Device:  Oral endotracheal tube    Airway Device Size:  7.5    Style/Cuff Inflation:  Cuffed (inflated to minimal occlusive pressure)    Inflation Amount (mL):  8    Tube secured:  24    Secured at:  The lips    Placement Verified By:  Capnometry    Complicating Factors:  None    Findings Post-Intubation:  BS equal bilateral and atraumatic/condition of teeth unchanged

## 2024-08-08 NOTE — ASSESSMENT & PLAN NOTE
Patient aware. Note sent and contact information for referral sent through [employer]. Patient said she wants to be seen today   She said she has open sores around her anus that are bleeding for a couple days now   Very painful  Desitin is not doing anything   She showers after every BM  Keeping area very clean   Had little BM this am      Patient with Paroxysmal (<7 days) atrial fibrillation which is controlled currently with Beta Blocker. Patient is currently in sinus rhythm.[id number] Score: 4.  Anticoagulation indicated. Anticoagulation done with coumadin .

## 2024-08-08 NOTE — ASSESSMENT & PLAN NOTE
Dariel Urbina is a 84 y.o. male evaluated for Epistaxis; Acute blood loss anemia; Bleeding; Chest pain on 8/5/2024. Well known to the ENT service for recurrent epistaxis requiring surgical intervention. 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 Previous surgical hx for control of epistaxis with bilateral SP ligation followed by embolization, bilateral facial and internal maxillary artery embolization on 11/03/2023, 12/18/23, left AEA ligation on 2/28/24, and bilateral endoscopic control/cauterization in OR on 6/18/24.     Now with left rhinorocket placed by ED, right merocel placed by ENT. Receiving 1u pRBC given Hgb 6.4. INR therapeutic at 2.8.  Admitted to hospital medicine for obs with packing in place and assist to optimize patient as appropriate prior to surgery     S/p IR embolization of right maxillary, 2 left maxillary collaterals, and a dominant left facial with IR 8/7.      -- Plan for OR today for nasal endoscopy with control of hemorrhage/cauterization    - NPO  -- Please cont anti-staph antibiotics while packing is in place  -- Nasal packing instructions  - Afrin to bilateral nares q8h for the next 48h  - May place mustache dressing under nose for trickling, ok if dressing becomes saturated with some red/brown drainage  - Nasal saline q4h while awake to bilateral packing to keep moist; with nightly application of vaseline/aquaphor to anterior nares  - Keep head of bed elevated  -- Remainder of care per medicine  -- Please page ENT on call with any additional questions or concerns

## 2024-08-08 NOTE — BRIEF OP NOTE
[facility] - Surgery (2nd Fl)  Brief Operative Note/Discharge Note    Surgery Date: 8/8/2024     Surgeons and Role:     * Jono Russell MD - Primary     * Yesenia Cross MD - Resident - Assisting    Procedure(s) (LRB):  Procedure(s):  CONTROL OF EPISTAXIS, POSTERIOR, USING NASAL PACKING OR CAUTERIZATION  ENDOSCOPY, NOSE    Pre-op Diagnosis:  Epistaxis [R04.0]    Post-op Diagnosis:  Post-Op Diagnosis Codes:     * Epistaxis [R04.0]    Procedure(s) (LRB):  CONTROL OF EPISTAXIS, POSTERIOR, USING NASAL PACKING OR CAUTERIZATION (Bilateral)  ENDOSCOPY, NOSE (N/A)    Anesthesia: General    Operative Findings: See full op note for full details -- first right packing removed, no active bleeding noted, nasal cavity irrigated and suctioned out; then left packing deflated and removed with minor spots along septum primarily with oozing once irritated, lateral nasal wall and anterior nasal floor were cauterized minimally to prevent further bleeding. Septal perforation again appreciated.    Estimated Blood Loss: Minimal <5cc           Specimens:   Specimens (From admission, onward)      None            Implants:  * No implants in log *

## 2024-08-08 NOTE — DISCHARGE SUMMARY
[facility] - Surgery ([facility])  [facility] Medicine  Discharge Summary      Patient Name: Dariel Urbina  MRN: [id number]  EMELIA: [id number]  Patient Class: IP- Inpatient  Admission Date: 8/5/2024  Hospital Length of Stay: 4 days  Discharge Date and Time:  08/09/2024 10:48 AM  Attending Physician: Sina Childers MD   Discharging Provider: Sina Childers MD  Primary Care Provider: Devon Langston Jr., MD  [facility] Medicine Team: [facility] HOSP MED Q Sina Childers MD  Primary Care Team: [facility] MED Q    HPI:   Patient is an 83 year old male with a PMHx of CHF, CKD, mechanical heart valve on Coumadin, recurrent epistaxis, anemia, NSTEMI, PVD and type 2 diabetes being admitted to medicine for recurrent epistaxis. Patient reports that epistaxis has been a recurrent issue for some time now. He is established with an ENT.  Patient states that he has daily nosebleeds a typically manages had home with compression with Afrin. Today he reports that his bleeding started approximately 10:00 p.m last night. He reports using almost half a bottle of Afrin to try to get the bleeding to stop and applying compression, but has been unsuccessful. Patient states that this amount of bleeding is unusual as it is causing him to gag and cough up the blood.  He states typically he is able to easily swallow clots that are dislodged. He does endorse some lightheadedness. He denies any chest pain or difficulty breathing.  He denies any abdominal pain, however he does endorse black tarry stools.  He has no other acute concerns at this time. Patient has an extensive surgical history attempting to resolve this issue.  Per patient and wife he has undergone multiple cauteries and ablation of blood vessels without success.    In the ED, vitals stable. Intake labs remarkable for Hgb 6.4, INR 2.8, Cr 2.1. He was type & screened, transfused 1 unit RBCs. ENT consulted and planning for IR embolization tomorrow on 8/6.    Procedure(s) (LRB):  CONTROL OF  EPISTAXIS, POSTERIOR, USING NASAL PACKING OR CAUTERIZATION (Bilateral)  ENDOSCOPY, NOSE (N/A)      Hospital Course:   Patient admitted for recurrent significant epistaxis requiring blood transfusions. Warfarin held on 8/5. Required 2nd unit of blood on 8/6 prior to going to OR for embolization. S/p IR embolization of the right maxillary, 2 left maxillary collaterals, and a dominant left facial arteries on 8/7. Patient's hgb stable on 8/7. Okay to restart warfarin, pharmacy managing. Plan for OR with ENT 8/8 for nasal endoscopy.     8/8 PMHx of CHF, CKD, mechanical heart valve on Coumadin, recurrent epistaxis, anemia, NSTEMI, PVD and type 2 diabetes - Patient admitted for recurrent significant epistaxis requiring blood transfusions. Warfarin held on 8/5. Required 2nd unit of blood on 8/6 prior to going to OR for embolization. S/p IR embolization of the right maxillary, 2 left maxillary collaterals, and a dominant left facial arteries on 8/7. Patient's hgb stable on 8/7. Okay to restart warfarin, pharmacy managing.  No epistaxis. OR with ENT today  for nasal endoscopy. continue keflex while packing is in place. Afrin to bilateral nares q8h for the next 48h. packs removed. ENT  cauterized  very superficial oozing on the left side - no packing in - irrigated his nasal passages well. okay to restart all blood thinners from ENT perspective outpatient follow up   8/9 Hb stable at 7.9. keflex discontinued .  ok for discharge for ENT perspective. Discharge with Nasal saline spray q4h WA applied into nasal cavities daily with nightly application of vaseline/aquaphor to anterior nares.  Keep head of bed elevated. Discharge with ENT follow up        Goals of Care Treatment Preferences:  Code Status: Full Code          What is most important right now is to focus on remaining as independent as possible, symptom/pain control, extending life as long as possible, even it it means sacrificing quality.  Accordingly, we have decided  that the best plan to meet the patient's goals includes continuing with treatment.      SDOH Screening:  The patient was screened for utility difficulties, food insecurity, transport difficulties, housing insecurity, and interpersonal safety and there were no concerns identified this admission.     Consults:   Consults (From admission, onward)          Status Ordering Provider     [facility] Medicine PharmD Consult  Once        Provider:  (Not yet assigned)    Acknowledged SASHA CARY JR     Inpatient consult to Interventional Radiology  Once        Provider:  (Not yet assigned)    Completed SASHA CARY JR     Inpatient consult to ENT  Once        Provider:  (Not yet assigned)    Completed JAYJAY CAROLINA                       Assessment/Plan:      * Recurrent epistaxis  + Acute blood loss anemia  Patient presents with recurrent epistaxis, follows closely outpatient with ENT. Hgb 6.4 on presentation. Given 1u pRBC on 6/5. Second unit on 8/6. S/p IR embolization of the right maxillary, 2 left maxillary collaterals, and a dominant left facial arteries on 8/7. Patient's hgb stable on 8/7. Okay to restart warfarin, pharmacy managing. Plan for OR with ENT 8/8 for nasal endoscopy.   - trend CBC  - Afrin q8h bilateral nares + q4h saline spray to packing  - Keflex for staph coverage while packing in place  - ENT consulted > procedure planned 8/8  - HOB elevated  8/8 PMHx of CHF, CKD, mechanical heart valve on Coumadin, recurrent epistaxis, anemia, NSTEMI, PVD and type 2 diabetes - Patient admitted for recurrent significant epistaxis requiring blood transfusions. Warfarin held on 8/5. Required 2nd unit of blood on 8/6 prior to going to OR for embolization. S/p IR embolization of the right maxillary, 2 left maxillary collaterals, and a dominant left facial arteries on 8/7. Patient's hgb stable on 8/7. Okay to restart warfarin, pharmacy managing.  No epistaxis. Plan for OR with ENT today  for nasal endoscopy.  continue keflex while packing is in place. Afrin to bilateral nares q8h for the next 48h  8/9 Hb stable at 7.9. keflex discontinued .  ok for discharge for ENT perspective. Discharge with Nasal saline spray q4h WA applied into nasal cavities daily with nightly application of vaseline/aquaphor to anterior nares.  Keep head of bed elevated. Discharge with ENT follow up      Paroxysmal atrial fibrillation  Patient with Paroxysmal (<7 days) atrial fibrillation which is controlled currently with Beta Blocker. Patient is currently in sinus rhythm.[id number] Score: 4.  Anticoagulation indicated. Anticoagulation done with coumadin .        Coronary artery disease involving native coronary artery of native heart without angina pectoris  s/p CABG      Acute blood loss anemia  Anemia is likely due to acute blood loss which was from epistaxis . Most recent hemoglobin and hematocrit are listed below.        Recent Labs     08/06/24  0223 08/07/24  0324 08/08/24  0535   HGB 6.7* 8.0* 7.6*   HCT 22.7* 26.2* 24.5*      Plan  - Monitor serial CBC: Daily  - Transfuse PRBC if patient becomes hemodynamically unstable, symptomatic or H/H drops below 7/21.  - Patient has received 2 units of PRBCs on 8/5, 8/6  - resuming warfarin on 8/7     Type 2 diabetes mellitus with stage 4 chronic kidney disease, without long-term current use of insulin  Creatine stable for now. BMP reviewed- noted Estimated Creatinine Clearance: 24.3 mL/min (A) (based on SCr of 2 mg/dL (H)). according to latest data. 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  Monitor UOP and serial BMP and adjust therapy as needed. Renally dose meds. Avoid nephrotoxic medications and procedures.  - low dose SSI, ACHS  - diabetic diet     Chronic anticoagulation  On coumadin for mechanical heart valve  - daily INR  - pharmacy consulted  - restarting warfarin 8/7         Final Active Diagnoses:    Diagnosis Date Noted POA    PRINCIPAL PROBLEM:  Recurrent epistaxis  "[R04.0] 12/21/2022 Yes    Paroxysmal atrial fibrillation [I48.0] 02/06/2023 Yes    Coronary artery disease involving native coronary artery of native heart without angina pectoris [I25.10] 12/29/2022 Yes    Acute blood loss anemia [D62] 12/21/2022 Yes    Type 2 diabetes mellitus with stage 4 chronic kidney disease, without long-term current use of insulin [E11.22, N18.4] 10/02/2013 Yes     Chronic    Chronic anticoagulation [Z79.01] 09/26/2012 Not Applicable      Problems Resolved During this Admission:       Discharged Condition: fair    Disposition: home    Follow Up:    Patient Instructions:      Ambulatory referral/consult to ENT   Standing Status: Future   Referral Priority: Routine Referral Type: Consultation   Referral Reason: Specialty Services Required   Requested Specialty: Otolaryngology   Number of Visits Requested: 1       Significant Diagnostic Studies: Labs: BMP:   Recent Labs   Lab 08/08/24  0535 08/09/24  0517   GLU 82 132*    135*   K 4.5 5.1    108   CO2 19* 19*   BUN 47* 45*   CREATININE 2.0* 1.9*   CALCIUM 9.9 9.9   , CMP   Recent Labs   Lab 08/08/24  0535 08/09/24  0517    135*   K 4.5 5.1    108   CO2 19* 19*   GLU 82 132*   BUN 47* 45*   CREATININE 2.0* 1.9*   CALCIUM 9.9 9.9   ANIONGAP 9 8   , CBC   Recent Labs   Lab 08/08/24  0535 08/09/24  0517   WBC 7.08 7.40   HGB 7.6* 7.9*   HCT 24.5* 26.2*   * 152   , INR   Lab Results   Component Value Date    INR 2.7 (H) 08/09/2024    INR 2.6 (H) 08/08/2024    INR 2.3 (H) 08/07/2024   , Lipid Panel   Lab Results   Component Value Date    CHOL 96 (L) 05/29/2024    HDL 31 (L) 05/29/2024    LDLCALC 36.2 (L) 05/29/2024    TRIG 144 05/29/2024    CHOLHDL 32.3 05/29/2024   , Troponin No results for input(s): "TROPONINI" in the last 168 hours., A1C:   Recent Labs   Lab 03/28/24  0710 05/29/24  1010 05/30/24  0713   HGBA1C 5.9* 5.5 5.3   , and All labs within the past 24 hours have been reviewed  Microbiology: Blood Culture " "  Lab Results   Component Value Date    LABBLOO No growth after 5 days. 03/19/2018   , Sputum Culture No results found for: "GSRESP", "RESPIRATORYC", and Urine Culture    Lab Results   Component Value Date    LABURIN No significant growth 01/21/2016     IR Angiogram Carotid Cerebral Bilateral inc Arch  Narrative: EXAM:    Cerebral angiogram.Refractory epistaxis PVA particle embolization.    CPT CODES:    Common carotid artery: 36224 x 2    External carotid artery: 36227 x 2    Additional selective branches: 36218 x 3    Embolization (non-CNS) Face/Neck: 61626 x 2; 75894 x 2    F/U Angio post embo: 75898 x 8    Closure device placement:     CLINICAL HISTORY AND INDICATION:    83 y.o. male with h/o recurrent epistaxis s/p bilateral SP ligation followed by IR embolization on 09/18/23, bilateral facial and internal maxillary artery embolization on 11/03/2023, 12/18/23, left AEA ligation on 2/28/24, and bilateral endoscopic control/cauterization in OR on 6/18/24 who presents for recurrent epistaxis. Pt is on coumadin with therapeutic INR for mechanical heart valve. ENT has planned endoscopy on thursday and would like repeat angiogram with possible intervention.    PROCEDURE COMMENT:    Informed consent was obtained from the patient's family prior to the examination. A timeout was performed.    Sedation: Patient was intubated prior to the procedure and general anesthesia provided by independent anesthesiology team. The patient was monitored throughout the procedure by an independent certified registered nursing anesthetist.    The right groin was prepared using standard sterile technique and a right common femoral artery puncture was performed with a micropuncture needle, under ultrasound guidance.  A 6F sheath was placed and connected to saline flush.  Through this sheath a 5F Ángel catheter was advanced over a wire and used to perform selective angiography of the following vessels: Right common femoral artery, " right common and external carotid arteries, left common and external carotid arteries with selective external carotid artery branches angiogram bilaterally.    Right common femoral artery demonstrates luminal irregularity in the distal common femoral artery and proximal deep peroneal branch indicative of severe atherosclerosis with mild flow-limiting stenosis.    FINDINGS AND INTERVENTION:    The right common carotid artery was selected and an angiogram was performed. Angiogram demonstrated luminal irregularity involving the proximal extracranial internal carotid artery segment and proximal external carotid artery segment.  No concern of any intraluminal floating thrombus/focal dissection.  Angiogram demonstrates bulge on the posterior wall of the proximal internal carotid artery segment serration measure 5 mm x 3 mm.  There is contrast stagnation and delayed contrast clearance noted from the ulceration in the plaque.  Internal carotid artery origin stenosis appears to be of mild severity (33% by NASCET criteria).    Right external carotid artery stenosis appears to be of moderate severity (6 by NASCET criteria).  External carotid artery demonstrates poststenotic dilatation after branching of superior thyroid artery.    Intracranial views demonstrate there is no intracranial flow delay.  Intracranial projections demonstrates filling of the right posterior cerebral artery directly and right middle cerebral artery branches.  The OTF is not seen from this injection.  There is good ophthalmic artery filling of the globe.    The right external carotid artery, followed by selective right internal maxillary artery angiogram was performed. Good flow is noted in all the major branches of the external carotid artery.  Distal branches from the internal maxillary artery demonstrates hypervascularity over posterior nasal fossa suggesting active extravasation.  Nasal branches appear are seen opacifying the nasal fossa appears  under roadmapping guidance, trueselect microcatheter was advanced over synchro 0.014 microwire first into distal right internal maxillary artery.  Distal maxillary angiography showed good filling of the nasal mucosa with tortuous arteries.  There were no dangerous collaterals from this branch.  Therefore, embolization was performed with 150-250 micron PVA particles until near stasis.  Microcatheter was pulled back into the common right internal maxillary artery trunk.  Follow-up right external carotid artery angiogram demonstrates preserved flow to all the branches of external carotid artery with the exception of the targeted internal maxillary feeders to posterior nasal fossa.    The left common carotid artery was selected and an angiogram was performed. Angiogram demonstrates excellent flow at the bifurcation without significant plaque buildup or stenosis.  Intracranial projection demonstrates good flow into the intracranial segments of the internal carotid artery with opacification of off the bilateral anterior cerebral arteries and left middle cerebral artery.  Angiogram is negative for any aneurysms or arteriovenous malformations.    The left external carotid artery angiogram was performed. Good flow is noted in all the major branches of the external carotid artery, with negative concerns for any atherosclerotic irregularity or stenosis. Prominent hypervascularity was noted in the nasal fossa, mostly from supplied by collateral branches from the internal maxillary artery, likely secondary to prior liquid + coil embolization.  Using roadmapping guidance, microcatheter was advanced over a microwire and a common origin of these feeding arteries 2 different branch arteries were selected followed by angiography showing no dangerous collaterals and sub sh embolization using 150-250 micron PVA particles.  Follow-up angiogram demonstrates no residual flow going to nasal fossa and preserved flow into the left middle  meningeal artery that has branch going to the left orbit.    Follow-up external carotid angiography demonstrated a large left facial artery supplying the nasal fossa anteriorly.  Under road mapping guidance the left facial artery was selected distally.  Super selective left facial artery angiogram was performed that demonstrated hypervascularity over nasal septum and inferior segment of nasal fossa.  Under roadmapping guidance, to select microcatheter was advanced into the left facial artery and tip of catheter was placed and the 1st segment of the facial artery.  This was followed by particle embolization using 150-250 micron PVA particles until near stasis was achieved.  Follow-up angiograms demonstrate no residue flow going to nasal fossa or septum indicative of satisfactory embolization.    The catheter was removed and a closure device was deployed in the right groin.    Hemostasis was obtained in the right groin using the 6 F Vascade device.    The total contrast dose for the procedure was: 102 cc of Visipaque.    Radiation dose:    Radiation DAP 67400 [id number]    Reference air kerma was 1385 mGy    Fluoro time was 28.1 minutes    Physicians in the procedure: Devon Aguilar MD    Fellow in the procedure: Armin Jaramillo MD, [facility]    Images and report were permanently recorded.  Impression: 1. Successful particle embolization of the right distal internal maxillary artery to the right nasal fossa.    2.  Successful particle embolization of the left nasal fossa via 2 collateral branches of the  maxillary artery and a large left facial artery.    3.  Left internal carotid artery atherosclerotic disease including and ulcerated plaque in the bulb.    Electronically signed by resident: Armin Jaramillo  Date:    08/07/2024  Time:    07:53    Electronically signed by: Devon Aguilar MD  Date:    08/07/2024  Time:    11:38       Pending Diagnostic Studies:       None           Medications:  Reconciled Home  Medications:      Medication List        CHANGE how you take these medications      Saline NasaL 0.65 % nasal spray  Generic drug: sodium chloride  2 sprays by Nasal route 4 (four) times daily. Nasal saline spray q4h WA applied into nasal cavities daily with nightly application of vaseline/aquaphor to anterior nares  - Keep head of bed elevated  What changed:   when to take this  additional instructions  Another medication with the same name was removed. Continue taking this medication, and follow the directions you see here.     warfarin 5 MG tablet  Commonly known as: COUMADIN  TAKE 1/2 TABLET (2.5MG) SATURDAY, THEN TAKE 1 TABLET (5MG) ALL OTHER DAYS OR AS INSTRUCTED BY COUMADIN CLINIC  What changed:   how much to take  how to take this  when to take this  additional instructions            CONTINUE taking these medications      acetaminophen 500 MG tablet  Commonly known as: TYLENOL  Take 500 mg by mouth daily as needed for Pain.     allopurinoL 100 MG tablet  Commonly known as: ZYLOPRIM  Take 1 tablet (100 mg total) by mouth once daily.     bumetanide 1 MG tablet  Commonly known as: BUMEX  Take 2 tablets (2 mg total) by mouth every other day.     ferrous gluconate 324 MG tablet  Commonly known as: FERGON  TAKE 1 TABLET EVERY DAY WITH BREAKFAST     fish oil-omega-3 fatty acids 300-1,000 mg capsule  Take 1 capsule by mouth once daily.     isosorbide mononitrate 60 MG 24 hr tablet  Commonly known as: IMDUR  Take 1 tablet (60 mg total) by mouth every evening.     metoprolol succinate 25 MG 24 hr tablet  Commonly known as: TOPROL-XL  TAKE 1 TABLET ONE TIME DAILY     rosuvastatin 40 MG Tab  Commonly known as: CRESTOR  TAKE 1 TABLET EVERY EVENING.     traZODone 50 MG tablet  Commonly known as: DESYREL  Take 1 tablet (50 mg total) by mouth every evening.              Indwelling Lines/Drains at time of discharge:   Lines/Drains/Airways       None                   45  minutes of time spent on discharge, including  examining the patient, providing discharge instructions, arranging   follow up and documentation        Sina Childers MD  Attending Staff Physician  [facility] Medicine  pager- [phone] [facility] - [phone]

## 2024-08-08 NOTE — SUBJECTIVE & OBJECTIVE
Interval History: No epistaxis. OR today for nasal endoscopy    Medications:  Continuous Infusions:  Scheduled Meds:   allopurinoL  100 mg Oral Daily    bumetanide  2 mg Oral Every other day    cephALEXin  500 mg Oral Q8H    ferrous gluconate  324 mg Oral Daily with breakfast    isosorbide mononitrate  60 mg Oral QHS    metoprolol succinate  12.5 mg Oral Daily    polyethylene glycol  17 g Oral Daily    sodium chloride  2 spray Each Nostril Q4H     PRN Meds:  Current Facility-Administered Medications:     0.9%  NaCl infusion (for blood administration), , Intravenous, Q24H PRN    0.9%  NaCl infusion (for blood administration), , Intravenous, Q24H PRN    acetaminophen, 1,000 mg, Oral, Q8H PRN    acetaminophen, 650 mg, Oral, Q4H PRN    albuterol-ipratropium, 3 mL, Nebulization, Q4H PRN    aluminum-magnesium hydroxide-simethicone, 30 mL, Oral, QID PRN    bisacodyL, 10 mg, Rectal, Daily PRN    glucagon (human recombinant), 1 mg, Intramuscular, PRN    glucagon (human recombinant), 1 mg, Intramuscular, PRN    glucose, 16 g, Oral, PRN    glucose, 24 g, Oral, PRN    haloperidol lactate, 0.5 mg, Intravenous, Q10 Min PRN    hydrALAZINE, 25 mg, Oral, Q6H PRN    HYDROmorphone, 0.2 mg, Intravenous, Q5 Min PRN    HYDROmorphone, 0.2 mg, Intravenous, Q5 Min PRN    melatonin, 6 mg, Oral, Nightly PRN    naloxone, 0.02 mg, Intravenous, PRN    ondansetron, 8 mg, Oral, Q8H PRN    ondansetron, 4 mg, Intravenous, Daily PRN    oxyCODONE, 5 mg, Oral, Q3H PRN    prochlorperazine, 5 mg, Intravenous, Q6H PRN    simethicone, 1 tablet, Oral, QID PRN    sodium chloride, 1 spray, Each Nostril, PRN    sodium chloride 0.9%, 10 mL, Intravenous, PRN    sodium chloride 0.9%, 5 mL, Intravenous, PRN     Review of patient's allergies indicates:   Allergen Reactions    Lisinopril     Losartan      Intolerance- elevates potassium level     Objective:     Vital Signs (24h Range):  Temp:  [97.4 °F (36.3 °C)-98.5 °F (36.9 °C)] 98.5 °F (36.9 °C)  Pulse:  []  60  Resp:  [18] 18  SpO2:  [92 %-100 %] 92 %  BP: (112-150)/(49-68) 135/49       Lines/Drains/Airways       Peripheral Intravenous Line  Duration                  Peripheral IV - Single Lumen 08/05/24 0701 20 G Anterior;Left Forearm 3 days         Peripheral IV - Single Lumen 08/06/24 1223 20 G Posterior;Right Hand 1 day                     Physical Exam   NAD  Awake and alert  Nose w/right merocel; left rhino rocket inflated, no anterior oozing or bloody crusting  OP without any clot   Normal WOB, no stridor or stertor  Significant Labs:  BMP:   Recent Labs   Lab 08/08/24  0535   GLU 82      CO2 19*   BUN 47*   CREATININE 2.0*   CALCIUM 9.9     CBC:   Recent Labs   Lab 08/07/24  0324   WBC 3.63*   RBC 2.67*   HGB 8.0*   HCT 26.2*   *   MCV 98   MCH 30.0   MCHC 30.5*       Significant Diagnostics:  I have reviewed all pertinent imaging results/findings within the past 24 hours.

## 2024-08-08 NOTE — OP NOTE
DATE OF PROCEDURE: 8/8/2024     PREOPERATIVE DIAGNOSES:   Epistaxis [R04.0]    POSTOPERATIVE DIAGNOSES:   Epistaxis [R04.0]    SURGEON:  Surgeons and Role:     * Jono Russell MD - Primary     * Yesenia Cross MD - Resident - Assisting      PROCEDURES PERFORMED:   Nasal endoscopy with control of epistaxis with monopolar cautery.     ANESTHESIA: General    FINDINGS:  Very small amount of diffuse oozing present on along the left septum and lateral nasal wall.    INDICATIONS FOR PROCEDURE:   Dariel Urbina is a 84 y.o. very well known to me.  He was history of mechanical valve is on chronic anticoagulation therapy.  He was previously been treated for epistaxis with endoscopic ligation of his sphenopalatine artery, anterior ethmoids.  He was also undergone a previous embolization with endovascular.  He was then returned with left-sided bleeding his INR was found to be therapeutic.  He was packed in the ER and subsequently went revision/repeat endovascular embolization.  This was a staged procedure for removal was packs and cautery of his nose for any potential mucosal oozing.    He was apprised of the risks, benefits and alternatives to surgery.  In spite of the risk inherent to surgery,he provided informed consent for the aforementioned procedures.     PROCEDURE IN DETAIL:  The patient was taken to the operating room and placed on the operating table in the supine position.  General endotracheal anesthesia was induced by the anesthesia team.     Preoperative time was taken to confirm the patient and procedure being performed.  The patient was then rotated 90° away from anesthesia.  He was prepped and draped in usual sterile fashion.    Zero-degree endoscope was used to assess the left and right nasal cavities.  A right with a Merocel removed.  A large blood clot was removed from the right maxillary sinus.  There was no evidence of mucosal oozing from the right side.  The 5.5 cm rhino rocket was then deflated  from the left nasal cavity.  The nose was copiously irrigated there was no distinct discrete pulsatile areas.  Some diffuse oozing was noted along the left septum and lateral nasal wall which was cauterized using monopolar cautery.    It was point the procedure deemed to be complete.  The patient was then handed back to anesthesia where he was extubated in stable condition.    There were no intraoperative complications.  I was present for and participated in the entire procedure as dictated above.       ESTIMATED BLOOD LOSS: <5cc    SPECIMENS:   Specimen (24h ago, onward)      None          Jono Russell MD

## 2024-08-08 NOTE — ASSESSMENT & PLAN NOTE
Creatine stable for now. BMP reviewed- noted Estimated Creatinine Clearance: 24.3 mL/min (A) (based on SCr of 2 mg/dL (H)). according to latest data. 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  Monitor UOP and serial BMP and adjust therapy as needed. Renally dose meds. Avoid nephrotoxic medications and procedures.  - low dose SSI, ACHS  - diabetic diet

## 2024-08-08 NOTE — ASSESSMENT & PLAN NOTE
+ Acute blood loss anemia  Patient presents with recurrent epistaxis, follows closely outpatient with ENT. Hgb 6.4 on presentation. Given 1u pRBC on 6/5. Second unit on 8/6. S/p IR embolization of the right maxillary, 2 left maxillary collaterals, and a dominant left facial arteries on 8/7. Patient's hgb stable on 8/7. Okay to restart warfarin, pharmacy managing. Plan for OR with ENT 8/8 for nasal endoscopy.   - trend CBC  - Afrin q8h bilateral nares + q4h saline spray to packing  - Keflex for staph coverage while packing in place  - ENT consulted > procedure planned 8/8  - HOB elevated  8/8 PMHx of CHF, CKD, mechanical heart valve on Coumadin, recurrent epistaxis, anemia, NSTEMI, PVD and type 2 diabetes - Patient admitted for recurrent significant epistaxis requiring blood transfusions. Warfarin held on 8/5. Required 2nd unit of blood on 8/6 prior to going to OR for embolization. S/p IR embolization of the right maxillary, 2 left maxillary collaterals, and a dominant left facial arteries on 8/7. Patient's hgb stable on 8/7. Okay to restart warfarin, pharmacy managing.  No epistaxis. Plan for OR with ENT today  for nasal endoscopy. continue keflex while packing is in place. Afrin to bilateral nares q8h for the next 48h.   8/8 PMHx of CHF, CKD, mechanical heart valve on Coumadin, recurrent epistaxis, anemia, NSTEMI, PVD and type 2 diabetes - Patient admitted for recurrent significant epistaxis requiring blood transfusions. Warfarin held on 8/5. Required 2nd unit of blood on 8/6 prior to going to OR for embolization. S/p IR embolization of the right maxillary, 2 left maxillary collaterals, and a dominant left facial arteries on 8/7. Patient's hgb stable on 8/7. Okay to restart warfarin, pharmacy managing.  No epistaxis. OR with ENT today  for nasal endoscopy. continue keflex while packing is in place. Afrin to bilateral nares q8h for the next 48h. packs removed. ENT  cauterized  very superficial oozing on the left  side - no packing in - irrigated his nasal passages well. okay to restart all blood thinners from ENT perspective outpatient follow up

## 2024-08-08 NOTE — ASSESSMENT & PLAN NOTE
Anemia is likely due to acute blood loss which was from epistaxis . Most recent hemoglobin and hematocrit are listed below.  Recent Labs     08/06/24  0223 08/07/24  0324 08/08/24  0535   HGB 6.7* 8.0* 7.6*   HCT 22.7* 26.2* 24.5*     Plan  - Monitor serial CBC: Daily  - Transfuse PRBC if patient becomes hemodynamically unstable, symptomatic or H/H drops below 7/21.  - Patient has received 2 units of PRBCs on 8/5, 8/6  - resuming warfarin on 8/7

## 2024-08-08 NOTE — PROGRESS NOTES
Abdulkadir Duval - Surgery ([facility])  [facility] Medicine  Progress Note    Patient Name: Dariel Urbina  MRN: [id number]  Patient Class: IP- Inpatient   Admission Date: 8/5/2024  Length of Stay: 3 days  Attending Physician: Sina Childers MD  Primary Care Provider: Devon Langston Jr., MD        Subjective:     Principal Problem:Recurrent epistaxis        HPI:  Patient is an 83 year old male with a PMHx of CHF, CKD, mechanical heart valve on Coumadin, recurrent epistaxis, anemia, NSTEMI, PVD and type 2 diabetes being admitted to medicine for recurrent epistaxis. Patient reports that epistaxis has been a recurrent issue for some time now. He is established with an ENT.  Patient states that he has daily nosebleeds a typically manages had home with compression with Afrin. Today he reports that his bleeding started approximately 10:00 p.m last night. He reports using almost half a bottle of Afrin to try to get the bleeding to stop and applying compression, but has been unsuccessful. Patient states that this amount of bleeding is unusual as it is causing him to gag and cough up the blood.  He states typically he is able to easily swallow clots that are dislodged. He does endorse some lightheadedness. He denies any chest pain or difficulty breathing.  He denies any abdominal pain, however he does endorse black tarry stools.  He has no other acute concerns at this time. Patient has an extensive surgical history attempting to resolve this issue.  Per patient and wife he has undergone multiple cauteries and ablation of blood vessels without success.    In the ED, vitals stable. Intake labs remarkable for Hgb 6.4, INR 2.8, Cr 2.1. He was type & screened, transfused 1 unit RBCs. ENT consulted and planning for IR embolization tomorrow on 8/6.    Overview/Hospital Course:  Patient admitted for recurrent significant epistaxis requiring blood transfusions. Warfarin held on 8/5. Required 2nd unit of blood on 8/6 prior to going to OR  for embolization. S/p IR embolization of the right maxillary, 2 left maxillary collaterals, and a dominant left facial arteries on 8/7. Patient's hgb stable on 8/7. Okay to restart warfarin, pharmacy managing. Plan for OR with ENT 8/8 for nasal endoscopy.     8/8 PMHx of CHF, CKD, mechanical heart valve on Coumadin, recurrent epistaxis, anemia, NSTEMI, PVD and type 2 diabetes - Patient admitted for recurrent significant epistaxis requiring blood transfusions. Warfarin held on 8/5. Required 2nd unit of blood on 8/6 prior to going to OR for embolization. S/p IR embolization of the right maxillary, 2 left maxillary collaterals, and a dominant left facial arteries on 8/7. Patient's hgb stable on 8/7. Okay to restart warfarin, pharmacy managing.  No epistaxis. OR with ENT today  for nasal endoscopy. continue keflex while packing is in place. Afrin to bilateral nares q8h for the next 48h. packs removed. ENT  cauterized  very superficial oozing on the left side - no packing in - irrigated his nasal passages well. okay to restart all blood thinners from ENT perspective outpatient follow up       Review of Systems:   Pain scale:  Constitutional:  fever,  chills, headache, vision loss, hearing loss, weight loss, Generalized weakness, falls, loss of smell, loss of taste, poor appetite,  sore throat  Respiratory: cough, shortness of breath.   Cardiovascular: chest pain, dizziness, palpitations, orthopnea, swelling of feet, syncope  Gastrointestinal: nausea, vomiting, abdominal pain, diarrhea, black stool,  blood in stool, change in bowel habits, constipation  Genitourinary: hematuria, dysuria, urgency, frequency  Integument/Breast: rash,  pruritis  Hematologic/Lymphatic: easy bruising, lymphadenopathy  Musculoskeletal: arthralgias , myalgias, back pain, neck pain, knee pain  Neurological: confusion, seizures, tremors, slurred speech  Behavioral/Psych:  depression, anxiety, auditory or visual hallucinations     OBJECTIVE:      Physical Exam:  Body mass index is 25.42 kg/m².    Constitutional: Appears well-developed and well-nourished.   Head: Normocephalic and atraumatic.   Neck: Normal range of motion. Neck supple.   Cardiovascular: Normal heart rate.  Regular heart rhythm.  Pulmonary/Chest: Effort normal.   Abdominal: No distension.  No tenderness  Musculoskeletal: Normal range of motion. No edema.   Neurological: Alert and oriented to person, place, and time.   Skin: Skin is warm and dry.   Psychiatric: Normal mood and affect. Behavior is normal.                  Vital Signs  Temp: 97.5 °F (36.4 °C) (08/08/24 1131)  Pulse: 66 (08/08/24 1442)  Resp: 18 (08/08/24 1131)  BP: (!) 113/53 (08/08/24 1131)  SpO2: 99 % (08/08/24 1300)     24 Hour VS Range    Temp:  [97.4 °F (36.3 °C)-98.5 °F (36.9 °C)]   Pulse:  []   Resp:  [11-18]   BP: (112-150)/(49-64)   SpO2:  [92 %-100 %]     Intake/Output Summary (Last 24 hours) at 8/8/2024 1527  Last data filed at 8/8/2024 1050  Gross per 24 hour   Intake 1010 ml   Output 700 ml   Net 310 ml         I/O This Shift:  I/O this shift:  In: 470 [P.O.:120; IV Piggyback:350]  Out: -     Wt Readings from Last 3 Encounters:   08/08/24 69.3 kg (152 lb 12.5 oz)   07/16/24 69.3 kg (152 lb 12.5 oz)   06/25/24 71.7 kg (158 lb)       I have personally reviewed the vitals and recorded Intake/Output     Laboratory/Diagnostic Data:    CBC/Anemia Labs: Coags:    Recent Labs   Lab 08/06/24  0223 08/07/24  0324 08/08/24  0535   WBC 5.26 3.63* 7.08   HGB 6.7* 8.0* 7.6*   HCT 22.7* 26.2* 24.5*   * 139* 149*   * 98 97   RDW 17.9* 17.9* 17.6*    Recent Labs   Lab 08/05/24  0409 08/06/24 0223 08/07/24 0324 08/08/24  0535   INR 2.8* 2.4* 2.3* 2.6*   APTT 35.2*  --   --   --         Chemistries: ABG:   Recent Labs   Lab 08/05/24 0409 08/06/24 0223 08/07/24 0324 08/08/24  0535    139 138 136   K 4.3 4.4 4.7 4.5    112* 113* 108   CO2 21* 19* 14* 19*   BUN 48* 49* 41* 47*   CREATININE 2.1*  "1.8* 1.7* 2.0*   CALCIUM 9.8 9.4 9.4 9.9   PROT 6.6  --   --   --    BILITOT 0.4  --   --   --    ALKPHOS 78  --   --   --    ALT 14  --   --   --    AST 16  --   --   --     No results for input(s): "PH", "PCO2", "PO2", "HCO3", "POCSATURATED", "BE" in the last 168 hours.     POCT Glucose: HbA1c:    Recent Labs   Lab 08/05/24 2017 08/06/24  0741 08/08/24  1038   POCTGLUCOSE 83 80 75    Hemoglobin A1C   Date Value Ref Range Status   05/30/2024 5.3 4.0 - 5.6 % Final     Comment:     ADA Screening Guidelines:  5.7-6.4%  Consistent with prediabetes  >or=6.5%  Consistent with diabetes    High levels of fetal hemoglobin interfere with the HbA1C  assay. Heterozygous hemoglobin variants (HbS, HgC, etc)do  not significantly interfere with this assay.   However, presence of multiple variants may affect accuracy.     05/29/2024 5.5 4.0 - 5.6 % Final     Comment:     ADA Screening Guidelines:  5.7-6.4%  Consistent with prediabetes  >or=6.5%  Consistent with diabetes    High levels of fetal hemoglobin interfere with the HbA1C  assay. Heterozygous hemoglobin variants (HbS, HgC, etc)do  not significantly interfere with this assay.   However, presence of multiple variants may affect accuracy.     03/28/2024 5.9 (H) 4.0 - 5.6 % Final     Comment:     ADA Screening Guidelines:  5.7-6.4%  Consistent with prediabetes  >or=6.5%  Consistent with diabetes    High levels of fetal hemoglobin interfere with the HbA1C  assay. Heterozygous hemoglobin variants (HbS, HgC, etc)do  not significantly interfere with this assay.   However, presence of multiple variants may affect accuracy.          Cardiac Enzymes: Ejection Fractions:    No results for input(s): "CPK", "CPKMB", "MB", "TROPONINI" in the last 72 hours. EF   Date Value Ref Range Status   04/14/2023 60 % Final   12/30/2022 48 % Final     Nuc Stress EF   Date Value Ref Range Status   01/17/2023 46 % Final     Nuc Rest EF   Date Value Ref Range Status   01/17/2023 46  Final          No " "results for input(s): "COLORU", "APPEARANCEUA", "PHUR", "SPECGRAV", "PROTEINUA", "GLUCUA", "KETONESU", "BILIRUBINUA", "OCCULTUA", "NITRITE", "UROBILINOGEN", "LEUKOCYTESUR", "RBCUA", "WBCUA", "BACTERIA", "SQUAMEPITHEL", "HYALINECASTS" in the last 48 hours.    Invalid input(s): "WRIGHTSUR"    Procalcitonin (ng/mL)   Date Value   03/19/2018 0.20     Lactate (Lactic Acid) (mmol/L)   Date Value   09/29/2022 0.7   04/01/2018 0.6   03/19/2018 0.8     BNP (pg/mL)   Date Value   06/12/2024 808 (H)   05/29/2024 1,073 (H)   12/15/2023 793 (H)   04/12/2023 354 (H)   12/29/2022 1,188 (H)     CRP (mg/L)   Date Value   05/25/2010 3.4     Sed Rate (mm/hr)   Date Value   05/25/2010 57 (H)   06/01/2004 48 (H)     D-Dimer (mg/L FEU)   Date Value   12/29/2022 1.66 (H)     Ferritin (ng/mL)   Date Value   11/07/2023 41   08/21/2023 55   06/28/2023 41   05/31/2023 42   01/09/2023 71   10/20/2022 82   03/15/2022 61   11/19/2019 128   04/02/2018 267   10/12/2017 205     No results found for: "LDH"  Troponin I (ng/mL)   Date Value   06/01/2024 0.495 (H)   06/01/2024 0.566 (H)   05/31/2024 0.338 (H)   05/31/2024 0.101 (H)   05/31/2024 0.057 (H)   05/29/2024 0.034 (H)   12/15/2023 0.047 (H)   12/15/2023 0.055 (H)     CPK (U/L)   Date Value   04/13/2023 96   04/11/2008 126   03/11/2008 325 (H)   08/08/2005 177   06/27/2005 145   06/01/2004 145     Results for orders placed or performed in visit on 03/15/22   Vitamin D   Result Value Ref Range    Vit D, 25-Hydroxy 25 (L) 30 - 96 ng/mL     *Note: Due to a large number of results and/or encounters for the requested time period, some results have not been displayed. A complete set of results can be found in Results Review.     SARS-CoV2 (COVID-19) Qualitative PCR (no units)   Date Value   01/26/2022 Detected (A)   10/01/2021 Not Detected   05/18/2020 Not Detected       Microbiology labs for the last week  Microbiology Results (last 7 days)       ** No results found for the last 168 hours. **      "       Reviewed and noted in plan where applicable- Please see chart for full lab data.    Lines/Drains:       Peripheral IV - Single Lumen 08/05/24 0701 20 G Anterior;Left Forearm (Active)   Site Assessment Clean;Dry;Intact;No redness;No swelling;No warmth;No drainage 08/08/24 0400   Extremity Assessment Distal to IV No abnormal discoloration;No redness;No swelling;No warmth 08/07/24 2140   Line Status Saline locked 08/08/24 0400   Dressing Status Clean;Dry;Intact 08/08/24 0400   Dressing Intervention Integrity maintained 08/08/24 0400   Dressing Change Due 08/09/24 08/07/24 2140   Site Change Due 08/09/24 08/07/24 2140   Reason Not Rotated Not due 08/07/24 2140   Number of days: 3            Peripheral IV - Single Lumen 08/06/24 1223 20 G Posterior;Right Hand (Active)   Site Assessment Clean;Dry;Intact;No redness;No swelling;No warmth;No drainage 08/08/24 0400   Extremity Assessment Distal to IV No abnormal discoloration;No redness;No swelling;No warmth 08/07/24 2140   Line Status Saline locked 08/08/24 0400   Dressing Status Clean;Dry;Intact 08/08/24 0400   Dressing Intervention Integrity maintained 08/08/24 0400   Dressing Change Due 08/10/24 08/07/24 2140   Site Change Due 08/10/24 08/07/24 2140   Reason Not Rotated Not due 08/07/24 2140   Number of days: 1       Imaging  ECG Results              EKG 12-lead (Final result)        Collection Time Result Time QRS Duration OHS QTC Calculation    08/05/24 02:39:03 08/05/24 11:32:46 154 499                     Final result by Interface, Lab In [facility] (08/05/24 11:32:49)                   Narrative:    Test Reason : R58,    Vent. Rate : 066 BPM     Atrial Rate : 000 BPM     P-R Int : 000 ms          QRS Dur : 154 ms      QT Int : 476 ms       P-R-T Axes : 000 -16 007 degrees     QTc Int : 499 ms    Atrial fibrillation  Left bundle branch block  Abnormal ECG  When compared with ECG of 31-MAY-2024 09:14,  Questionable change in The axis  T wave inversion no longer  evident in Lateral leads  Confirmed by Jeremias APARICIO MD ([id number]) on 8/5/2024 11:32:41 AM    Referred By: [email or web address]   SELF           Confirmed By:Jeremias APARICIO MD                                    Results for orders placed during the hospital encounter of 05/29/24    Echo    Interpretation Summary    Left Ventricle: The left ventricle is normal in size. Normal wall thickness. There is concentric hypertrophy. Septal motion is consistent with post-operative status. There is low normal systolic function with a visually estimated ejection fraction of 50 - 55%. Biplane (2D) method of discs ejection fraction is 51%.    Right Ventricle: Mild right ventricular enlargement. Wall thickness is normal. Systolic function is normal.    Left Atrium: Left atrium is severely dilated.    Right Atrium: Right atrium is severely dilated.    Aortic Valve: There is a mechanical valve in the aortic position. Aortic valve area by VTI is 0.87 cm². Aortic valve peak velocity is 3.02 m/s. Mean gradient is 19 mmHg. The dimensionless index is 0.31. There is trace aortic regurgitation.    Mitral Valve: There is mild bileaflet sclerosis. There is moderate mitral annular calcification present. There is moderate to severe regurgitation with a centrally directed jet.    Tricuspid Valve: There is moderate regurgitation.    Pulmonary Artery: There is moderate pulmonary hypertension. The estimated pulmonary artery systolic pressure is 57 mmHg.    IVC/SVC: Intermediate venous pressure at 8 mmHg.      IR Angiogram Carotid Cerebral Bilateral inc Arch  Narrative: EXAM:    Cerebral angiogram.Refractory epistaxis PVA particle embolization.    CPT CODES:    Common carotid artery: 36224 x 2    External carotid artery: 36227 x 2    Additional selective branches: 36218 x 3    Embolization (non-CNS) Face/Neck: 61626 x 2; 75894 x 2    F/U Angio post embo: 75898 x 8    Closure device placement:     CLINICAL HISTORY AND INDICATION:    83 y.o. male with h/o recurrent  epistaxis s/p bilateral SP ligation followed by IR embolization on 09/18/23, bilateral facial and internal maxillary artery embolization on 11/03/2023, 12/18/23, left AEA ligation on 2/28/24, and bilateral endoscopic control/cauterization in OR on 6/18/24 who presents for recurrent epistaxis. Pt is on coumadin with therapeutic INR for mechanical heart valve. ENT has planned endoscopy on thursday and would like repeat angiogram with possible intervention.    PROCEDURE COMMENT:    Informed consent was obtained from the patient's family prior to the examination. A timeout was performed.    Sedation: Patient was intubated prior to the procedure and general anesthesia provided by independent anesthesiology team. The patient was monitored throughout the procedure by an independent certified registered nursing anesthetist.    The right groin was prepared using standard sterile technique and a right common femoral artery puncture was performed with a micropuncture needle, under ultrasound guidance.  A 6F sheath was placed and connected to saline flush.  Through this sheath a 5F Ángel catheter was advanced over a wire and used to perform selective angiography of the following vessels: Right common femoral artery, right common and external carotid arteries, left common and external carotid arteries with selective external carotid artery branches angiogram bilaterally.    Right common femoral artery demonstrates luminal irregularity in the distal common femoral artery and proximal deep peroneal branch indicative of severe atherosclerosis with mild flow-limiting stenosis.    FINDINGS AND INTERVENTION:    The right common carotid artery was selected and an angiogram was performed. Angiogram demonstrated luminal irregularity involving the proximal extracranial internal carotid artery segment and proximal external carotid artery segment.  No concern of any intraluminal floating thrombus/focal dissection.  Angiogram demonstrates  bulge on the posterior wall of the proximal internal carotid artery segment serration measure 5 mm x 3 mm.  There is contrast stagnation and delayed contrast clearance noted from the ulceration in the plaque.  Internal carotid artery origin stenosis appears to be of mild severity (33% by NASCET criteria).    Right external carotid artery stenosis appears to be of moderate severity (6 by NASCET criteria).  External carotid artery demonstrates poststenotic dilatation after branching of superior thyroid artery.    Intracranial views demonstrate there is no intracranial flow delay.  Intracranial projections demonstrates filling of the right posterior cerebral artery directly and right middle cerebral artery branches.  The OTF is not seen from this injection.  There is good ophthalmic artery filling of the globe.    The right external carotid artery, followed by selective right internal maxillary artery angiogram was performed. Good flow is noted in all the major branches of the external carotid artery.  Distal branches from the internal maxillary artery demonstrates hypervascularity over posterior nasal fossa suggesting active extravasation.  Nasal branches appear are seen opacifying the nasal fossa appears under roadmapping guidance, trueselect microcatheter was advanced over synchro 0.014 microwire first into distal right internal maxillary artery.  Distal maxillary angiography showed good filling of the nasal mucosa with tortuous arteries.  There were no dangerous collaterals from this branch.  Therefore, embolization was performed with 150-250 micron PVA particles until near stasis.  Microcatheter was pulled back into the common right internal maxillary artery trunk.  Follow-up right external carotid artery angiogram demonstrates preserved flow to all the branches of external carotid artery with the exception of the targeted internal maxillary feeders to posterior nasal fossa.    The left common carotid artery was  selected and an angiogram was performed. Angiogram demonstrates excellent flow at the bifurcation without significant plaque buildup or stenosis.  Intracranial projection demonstrates good flow into the intracranial segments of the internal carotid artery with opacification of off the bilateral anterior cerebral arteries and left middle cerebral artery.  Angiogram is negative for any aneurysms or arteriovenous malformations.    The left external carotid artery angiogram was performed. Good flow is noted in all the major branches of the external carotid artery, with negative concerns for any atherosclerotic irregularity or stenosis. Prominent hypervascularity was noted in the nasal fossa, mostly from supplied by collateral branches from the internal maxillary artery, likely secondary to prior liquid + coil embolization.  Using roadmapping guidance, microcatheter was advanced over a microwire and a common origin of these feeding arteries 2 different branch arteries were selected followed by angiography showing no dangerous collaterals and sub sh embolization using 150-250 micron PVA particles.  Follow-up angiogram demonstrates no residual flow going to nasal fossa and preserved flow into the left middle meningeal artery that has branch going to the left orbit.    Follow-up external carotid angiography demonstrated a large left facial artery supplying the nasal fossa anteriorly.  Under road mapping guidance the left facial artery was selected distally.  Super selective left facial artery angiogram was performed that demonstrated hypervascularity over nasal septum and inferior segment of nasal fossa.  Under roadmapping guidance, to select microcatheter was advanced into the left facial artery and tip of catheter was placed and the 1st segment of the facial artery.  This was followed by particle embolization using 150-250 micron PVA particles until near stasis was achieved.  Follow-up angiograms demonstrate no residue  flow going to nasal fossa or septum indicative of satisfactory embolization.    The catheter was removed and a closure device was deployed in the right groin.    Hemostasis was obtained in the right groin using the 6 F Vascade device.    The total contrast dose for the procedure was: 102 cc of Visipaque.    Radiation dose:    Radiation DAP [id number]    Reference air kerma was 1385 mGy    Fluoro time was 28.1 minutes    Physicians in the procedure: Devon Aguilar MD    Fellow in the procedure: Armin Jaramillo MD, [facility]    Images and report were permanently recorded.  Impression: 1. Successful particle embolization of the right distal internal maxillary artery to the right nasal fossa.    2.  Successful particle embolization of the left nasal fossa via 2 collateral branches of the  maxillary artery and a large left facial artery.    3.  Left internal carotid artery atherosclerotic disease including and ulcerated plaque in the bulb.    Electronically signed by resident: Armin Jaramillo  Date:    08/07/2024  Time:    07:53    Electronically signed by: Devon Aguilar MD  Date:    08/07/2024  Time:    11:38      Labs, Imaging, EKG and Diagnostic results from 8/8/2024 were reviewed.    Medications:  Medication list was reviewed and changes noted under Assessment/Plan.  Current Facility-Administered Medications on File Prior to Encounter   Medication Dose Route Frequency Provider Last Rate Last Admin    ceFAZolin 2 g in dextrose 5 % in water (D5W) 50 mL IVPB (MB+)  2 g Intravenous On Call Procedure Yenifer Sandoval MD        HYDROmorphone injection 0.2 mg  0.2 mg Intravenous Q5 Min PRN Saeed Farrell MD        sodium chloride 0.9% flush 10 mL  10 mL Intravenous PRN Saeed Farrell MD         Current Outpatient Medications on File Prior to Encounter   Medication Sig Dispense Refill    acetaminophen (TYLENOL) 500 MG tablet Take 500 mg by mouth daily as needed for Pain.      allopurinoL (ZYLOPRIM) 100 MG tablet  Take 1 tablet (100 mg total) by mouth once daily. 90 tablet 3    bumetanide (BUMEX) 1 MG tablet Take 2 tablets (2 mg total) by mouth every other day. 90 tablet 3    ferrous gluconate (FERGON) 324 MG tablet TAKE 1 TABLET EVERY DAY WITH BREAKFAST 90 tablet 3    isosorbide mononitrate (IMDUR) 60 MG 24 hr tablet Take 1 tablet (60 mg total) by mouth every evening. 90 tablet 3    metoprolol succinate (TOPROL-XL) 25 MG 24 hr tablet TAKE 1 TABLET ONE TIME DAILY 90 tablet 3    omega-3 fatty acids/fish oil (FISH OIL-OMEGA-3 FATTY ACIDS) 300-1,000 mg capsule Take 1 capsule by mouth once daily.      rosuvastatin (CRESTOR) 40 MG Tab TAKE 1 TABLET EVERY EVENING. 90 tablet 3    sodium chloride (OCEAN) 0.65 % nasal spray 1 spray by Nasal route as needed for Congestion. 44 mL 12    sodium chloride (SALINE NASAL) 0.65 % nasal spray 2 sprays by Nasal route 3 (three) times daily. 44 mL 3    traZODone (DESYREL) 50 MG tablet Take 1 tablet (50 mg total) by mouth every evening. 90 tablet 2    warfarin (COUMADIN) 5 MG tablet TAKE 1/2 TABLET (2.5MG) SATURDAY, THEN TAKE 1 TABLET (5MG) ALL OTHER DAYS OR AS INSTRUCTED BY COUMADIN CLINIC (Patient taking differently: Take 5 mg by mouth every evening.) 90 tablet 3     Scheduled Medications:  Current Facility-Administered Medications   Medication Dose Route Frequency    allopurinoL  100 mg Oral Daily    bumetanide  2 mg Oral Every other day    cephALEXin  500 mg Oral Q8H    ferrous gluconate  324 mg Oral Daily with breakfast    isosorbide mononitrate  60 mg Oral QHS    metoprolol succinate  12.5 mg Oral Daily    polyethylene glycol  17 g Oral Daily    sodium chloride  2 spray Each Nostril Q4H    [START ON 8/9/2024] warfarin  2.5 mg Oral Every Mon, Fri    warfarin  5 mg Oral Every Tues, Thurs, Sat, Sun    [START ON 8/14/2024] warfarin  5 mg Oral Every Wed     PRN:   Current Facility-Administered Medications:     0.9%  NaCl infusion (for blood administration), , Intravenous, Q24H PRN    0.9%  NaCl  infusion (for blood administration), , Intravenous, Q24H PRN    acetaminophen, 1,000 mg, Oral, Q8H PRN    acetaminophen, 650 mg, Oral, Q4H PRN    albuterol-ipratropium, 3 mL, Nebulization, Q4H PRN    aluminum-magnesium hydroxide-simethicone, 30 mL, Oral, QID PRN    bisacodyL, 10 mg, Rectal, Daily PRN    glucagon (human recombinant), 1 mg, Intramuscular, PRN    glucose, 16 g, Oral, PRN    glucose, 24 g, Oral, PRN    hydrALAZINE, 25 mg, Oral, Q6H PRN    melatonin, 6 mg, Oral, Nightly PRN    naloxone, 0.02 mg, Intravenous, PRN    ondansetron, 8 mg, Oral, Q8H PRN    prochlorperazine, 5 mg, Intravenous, Q6H PRN    simethicone, 1 tablet, Oral, QID PRN    sodium chloride, 1 spray, Each Nostril, PRN    sodium chloride 0.9%, 10 mL, Intravenous, PRN    sodium chloride 0.9%, 5 mL, Intravenous, PRN  Infusions:   Estimated Creatinine Clearance: 24.3 mL/min (A) (based on SCr of 2 mg/dL (H)).             Assessment/Plan:      * Recurrent epistaxis  + Acute blood loss anemia  Patient presents with recurrent epistaxis, follows closely outpatient with ENT. Hgb 6.4 on presentation. Given 1u pRBC on 6/5. Second unit on 8/6. S/p IR embolization of the right maxillary, 2 left maxillary collaterals, and a dominant left facial arteries on 8/7. Patient's hgb stable on 8/7. Okay to restart warfarin, pharmacy managing. Plan for OR with ENT 8/8 for nasal endoscopy.   - trend CBC  - Afrin q8h bilateral nares + q4h saline spray to packing  - Keflex for staph coverage while packing in place  - ENT consulted > procedure planned 8/8  - HOB elevated  8/8 PMHx of CHF, CKD, mechanical heart valve on Coumadin, recurrent epistaxis, anemia, NSTEMI, PVD and type 2 diabetes - Patient admitted for recurrent significant epistaxis requiring blood transfusions. Warfarin held on 8/5. Required 2nd unit of blood on 8/6 prior to going to OR for embolization. S/p IR embolization of the right maxillary, 2 left maxillary collaterals, and a dominant left facial  arteries on 8/7. Patient's hgb stable on 8/7. Okay to restart warfarin, pharmacy managing.  No epistaxis. Plan for OR with ENT today  for nasal endoscopy. continue keflex while packing is in place. Afrin to bilateral nares q8h for the next 48h.   8/8 PMHx of CHF, CKD, mechanical heart valve on Coumadin, recurrent epistaxis, anemia, NSTEMI, PVD and type 2 diabetes - Patient admitted for recurrent significant epistaxis requiring blood transfusions. Warfarin held on 8/5. Required 2nd unit of blood on 8/6 prior to going to OR for embolization. S/p IR embolization of the right maxillary, 2 left maxillary collaterals, and a dominant left facial arteries on 8/7. Patient's hgb stable on 8/7. Okay to restart warfarin, pharmacy managing.  No epistaxis. OR with ENT today  for nasal endoscopy. continue keflex while packing is in place. Afrin to bilateral nares q8h for the next 48h. packs removed. ENT  cauterized  very superficial oozing on the left side - no packing in - irrigated his nasal passages well. okay to restart all blood thinners from ENT perspective outpatient follow up     Paroxysmal atrial fibrillation  Patient with Paroxysmal (<7 days) atrial fibrillation which is controlled currently with Beta Blocker. Patient is currently in sinus rhythm.[id number] Score: 4.  Anticoagulation indicated. Anticoagulation done with coumadin .      Coronary artery disease involving native coronary artery of native heart without angina pectoris  s/p CABG     Acute blood loss anemia  Anemia is likely due to acute blood loss which was from epistaxis . Most recent hemoglobin and hematocrit are listed below.  Recent Labs     08/06/24  0223 08/07/24  0324 08/08/24  0535   HGB 6.7* 8.0* 7.6*   HCT 22.7* 26.2* 24.5*     Plan  - Monitor serial CBC: Daily  - Transfuse PRBC if patient becomes hemodynamically unstable, symptomatic or H/H drops below 7/21.  - Patient has received 2 units of PRBCs on 8/5, 8/6  - resuming warfarin on 8/7    Type 2  diabetes mellitus with stage 4 chronic kidney disease, without long-term current use of insulin  Creatine stable for now. BMP reviewed- noted Estimated Creatinine Clearance: 24.3 mL/min (A) (based on SCr of 2 mg/dL (H)). according to latest data. 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  Monitor UOP and serial BMP and adjust therapy as needed. Renally dose meds. Avoid nephrotoxic medications and procedures.  - low dose SSI, ACHS  - diabetic diet    Chronic anticoagulation  On coumadin for mechanical heart valve  - daily INR  - pharmacy consulted  - restarting warfarin 8/7   patient with INR   Recent Labs   Lab 08/06/24  0223 08/07/24  0324 08/08/24  0535   INR 2.4* 2.3* 2.6*         VTE Risk Mitigation (From admission, onward)           Ordered     warfarin (COUMADIN) tablet 5 mg  Every Wednesday 08/08/24 1439     warfarin (COUMADIN) tablet 2.5 mg  Every Mon, Fri 08/08/24 1439     warfarin (COUMADIN) tablet 5 mg  Every Tues, Thurs, Sat, Sun         08/08/24 1439     IP VTE LOW RISK PATIENT  Once         08/05/24 0941     Place sequential compression device  Until discontinued         08/05/24 0941                    Discharge Planning   ASTER: 8/9/2024     Code Status: Full Code   Is the patient medically ready for discharge?:     Reason for patient still in hospital (select all that apply): Treatment  Discharge Plan A: Home with family                  Sina Childers MD  Department of Hospital Medicine   [facility] - Surgery ([facility])

## 2024-08-08 NOTE — ASSESSMENT & PLAN NOTE
Dariel Urbina is a 84 y.o. male evaluated for Epistaxis; Acute blood loss anemia; Bleeding; Chest pain on 8/5/2024. Well known to the ENT service for recurrent epistaxis requiring surgical intervention. 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 Previous surgical hx for control of epistaxis with bilateral SP ligation followed by embolization, bilateral facial and internal maxillary artery embolization on 11/03/2023, 12/18/23, left AEA ligation on 2/28/24, and bilateral endoscopic control/cauterization in OR on 6/18/24.     Now with left rhinorocket placed by ED, right merocel placed by ENT. Receiving 1u pRBC given Hgb 6.4. INR therapeutic at 2.8.  Admitted to hospital medicine for obs with packing in place and assist to optimize patient as appropriate prior to surgery     S/p IR embolization of right maxillary, 2 left maxillary collaterals, and a dominant left facial with IR 8/7. Now s/p OR nasal endoscopy with packing removal 8/8 with only minor oozing noted on within left nasal cavity along septum, nasal floor, and one region of lateral nasal wall.     - ok for discharge for ENT perspective. Will arrange follow up.  - Discharge with Nasal saline spray q4h WA applied into nasal cavities daily with nightly application of vaseline/aquaphor to anterior nares  - Keep head of bed elevated  -- Continue anticoagulation as directed by medicine/cardiology teams   -- Remainder of care per medicine  -- Please page ENT on call with any additional questions or concerns

## 2024-08-08 NOTE — TRANSFER OF CARE
"Anesthesia Transfer of Care Note    Patient: Dariel Urbina    Procedure(s) Performed: Procedure(s) (LRB):  CONTROL OF EPISTAXIS, POSTERIOR, USING NASAL PACKING OR CAUTERIZATION (Bilateral)  ENDOSCOPY, NOSE (N/A)    Patient location: [facility]    Anesthesia Type: general    Transport from OR: Transported from OR on 6-10 L/min O2 by face mask with adequate spontaneous ventilation    Post pain: adequate analgesia    Post assessment: no apparent anesthetic complications    Post vital signs: stable    Level of consciousness: awake    Nausea/Vomiting: no nausea/vomiting    Complications: none    Transfer of care protocol was followed      Last vitals: Visit Vitals  /61 (BP Location: Right arm, Patient Position: Lying)   Pulse (!) 51   Temp 36.4 °C (97.5 °F) (Temporal)   Resp 18   Ht 5' 5" (1.651 m)   Wt 69.3 kg (152 lb 12.5 oz)   SpO2 100%   BMI 25.42 kg/m²     "

## 2024-08-08 NOTE — NURSING TRANSFER
Nursing Transfer Note      8/8/2024   11:10 AM    Nurse giving handoff:RADHA Benavides PACU  Nurse receiving handoff:RADHA Neri [address]    Reason patient is being transferred: post anesthesia    Transfer To: [id number]    Transfer via stretcher    Transported by PCT    Transfer Vital Signs:  Blood Pressure:139/63  Heart Rate:58  O2:100%-- RA  Temperature:97.5  Respirations:14    Medicines sent: n/a    Any special needs or follow-up needed: n/a    Patient belongings transferred with patient: No    Chart send with patient: Yes    Patient reassessed at: 8/8/24@1100

## 2024-08-08 NOTE — PLAN OF CARE
AAOX4. VS stable - bradycardic. PO ABX given. Femoral dressing remained clean, dry, intact. Rhino rockets remained in place with no bleed through noted. Pt ambulated in room with standby assistance without issue. NPO at midnight except for sips of water with medication. No adverse events noted during this shift. No new complaints reported.    Problem: Adult Inpatient Plan of Care  Goal: Plan of Care Review  Outcome: Progressing  Flowsheets (Taken 8/8/2024 0547)  Plan of Care Reviewed With: patient  Goal: Patient-Specific Goal (Individualized)  Outcome: Progressing  Goal: Absence of Hospital-Acquired Illness or Injury  Outcome: Progressing  Intervention: Prevent and Manage VTE (Venous Thromboembolism) Risk  Flowsheets (Taken 8/8/2024 0547)  VTE Prevention/Management:   ambulation promoted   bleeding precations maintained   dorsiflexion/plantar flexion performed   fluids promoted   ROM (active) performed  Goal: Optimal Comfort and Wellbeing  Outcome: Progressing  Intervention: Monitor Pain and Promote Comfort  Flowsheets (Taken 8/8/2024 0547)  Pain Management Interventions:   care clustered   pain management plan reviewed with patient/caregiver   medication offered but refused   pillow support provided   position adjusted   quiet environment facilitated   relaxation techniques promoted  Goal: Readiness for Transition of Care  Outcome: Progressing     Problem: Infection  Goal: Absence of Infection Signs and Symptoms  Outcome: Progressing     Problem: Acute Kidney Injury/Impairment  Goal: Fluid and Electrolyte Balance  Outcome: Progressing  Intervention: Monitor and Manage Fluid and Electrolyte Balance  Flowsheets (Taken 8/8/2024 0547)  Fluid/Electrolyte Management: fluids provided     Problem: Fall Injury Risk  Goal: Absence of Fall and Fall-Related Injury  Outcome: Progressing  Intervention: Identify and Manage Contributors  Flowsheets (Taken 8/8/2024 0547)  Self-Care Promotion:   independence encouraged   BADL  personal objects within reach     Problem: Wound  Goal: Optimal Wound Healing  Outcome: Progressing  Intervention: Promote Wound Healing  Flowsheets (Taken 8/8/2024 1059)  Sleep/Rest Enhancement:   room darkened   relaxation techniques promoted   regular sleep/rest pattern promoted   noise level reduced   awakenings minimized

## 2024-08-09 ENCOUNTER — TELEPHONE (OUTPATIENT)
Dept: OTOLARYNGOLOGY | Facility: CLINIC | Age: 84
End: 2024-08-09
Payer: MEDICARE

## 2024-08-09 VITALS
OXYGEN SATURATION: 97 % | DIASTOLIC BLOOD PRESSURE: 62 MMHG | HEIGHT: 65 IN | HEART RATE: 54 BPM | RESPIRATION RATE: 18 BRPM | WEIGHT: 152.75 LBS | SYSTOLIC BLOOD PRESSURE: 161 MMHG | BODY MASS INDEX: 25.45 KG/M2 | TEMPERATURE: 98 F

## 2024-08-09 LAB
ANION GAP SERPL CALC-SCNC: 8 MMOL/L (ref 8–16)
BASOPHILS # BLD AUTO: 0.01 K/UL (ref 0–0.2)
BASOPHILS NFR BLD: 0.1 % (ref 0–1.9)
BUN SERPL-MCNC: 45 MG/DL (ref 8–23)
CALCIUM SERPL-MCNC: 9.9 MG/DL (ref 8.7–10.5)
CHLORIDE SERPL-SCNC: 108 MMOL/L (ref 95–110)
CO2 SERPL-SCNC: 19 MMOL/L (ref 23–29)
CREAT SERPL-MCNC: 1.9 MG/DL (ref 0.5–1.4)
DIFFERENTIAL METHOD BLD: ABNORMAL
EOSINOPHIL # BLD AUTO: 0 K/UL (ref 0–0.5)
EOSINOPHIL NFR BLD: 0 % (ref 0–8)
ERYTHROCYTE [DISTWIDTH] IN BLOOD BY AUTOMATED COUNT: 18 % (ref 11.5–14.5)
EST. GFR  (NO RACE VARIABLE): 34.6 ML/MIN/1.73 M^2
GLUCOSE SERPL-MCNC: 132 MG/DL (ref 70–110)
HCT VFR BLD AUTO: 26.2 % (ref 40–54)
HGB BLD-MCNC: 7.9 G/DL (ref 14–18)
IMM GRANULOCYTES # BLD AUTO: 0.09 K/UL (ref 0–0.04)
IMM GRANULOCYTES NFR BLD AUTO: 1.2 % (ref 0–0.5)
INR PPP: 2.7 (ref 0.8–1.2)
LYMPHOCYTES # BLD AUTO: 0.4 K/UL (ref 1–4.8)
LYMPHOCYTES NFR BLD: 5.4 % (ref 18–48)
MCH RBC QN AUTO: 30.2 PG (ref 27–31)
MCHC RBC AUTO-ENTMCNC: 30.2 G/DL (ref 32–36)
MCV RBC AUTO: 100 FL (ref 82–98)
MONOCYTES # BLD AUTO: 0.3 K/UL (ref 0.3–1)
MONOCYTES NFR BLD: 3.5 % (ref 4–15)
NEUTROPHILS # BLD AUTO: 6.6 K/UL (ref 1.8–7.7)
NEUTROPHILS NFR BLD: 89.8 % (ref 38–73)
NRBC BLD-RTO: 0 /100 WBC
PLATELET # BLD AUTO: 152 K/UL (ref 150–450)
PMV BLD AUTO: 10.8 FL (ref 9.2–12.9)
POTASSIUM SERPL-SCNC: 5.1 MMOL/L (ref 3.5–5.1)
PROTHROMBIN TIME: 27.7 SEC (ref 9–12.5)
RBC # BLD AUTO: 2.62 M/UL (ref 4.6–6.2)
SODIUM SERPL-SCNC: 135 MMOL/L (ref 136–145)
WBC # BLD AUTO: 7.4 K/UL (ref 3.9–12.7)

## 2024-08-09 PROCEDURE — 36415 COLL VENOUS BLD VENIPUNCTURE: CPT | Mod: HCNC | Performed by: STUDENT IN AN ORGANIZED HEALTH CARE EDUCATION/TRAINING PROGRAM

## 2024-08-09 PROCEDURE — 85025 COMPLETE CBC W/AUTO DIFF WBC: CPT | Mod: HCNC | Performed by: STUDENT IN AN ORGANIZED HEALTH CARE EDUCATION/TRAINING PROGRAM

## 2024-08-09 PROCEDURE — 85610 PROTHROMBIN TIME: CPT | Mod: HCNC | Performed by: STUDENT IN AN ORGANIZED HEALTH CARE EDUCATION/TRAINING PROGRAM

## 2024-08-09 PROCEDURE — 80048 BASIC METABOLIC PNL TOTAL CA: CPT | Mod: HCNC | Performed by: STUDENT IN AN ORGANIZED HEALTH CARE EDUCATION/TRAINING PROGRAM

## 2024-08-09 PROCEDURE — 25000003 PHARM REV CODE 250: Mod: HCNC | Performed by: PHYSICIAN ASSISTANT

## 2024-08-09 PROCEDURE — 25000003 PHARM REV CODE 250: Mod: HCNC | Performed by: STUDENT IN AN ORGANIZED HEALTH CARE EDUCATION/TRAINING PROGRAM

## 2024-08-09 RX ORDER — VITAMIN A 3000 MCG
2 CAPSULE ORAL 4 TIMES DAILY
Qty: 44 ML | Refills: 3 | Status: SHIPPED | OUTPATIENT
Start: 2024-08-09

## 2024-08-09 RX ORDER — VITAMIN A 3000 MCG
2 CAPSULE ORAL 4 TIMES DAILY
Qty: 44 ML | Refills: 3 | Status: SHIPPED | OUTPATIENT
Start: 2024-08-09 | End: 2024-08-09

## 2024-08-09 RX ADMIN — NASAL 2 SPRAY: 6.5 SPRAY NASAL at 02:08

## 2024-08-09 RX ADMIN — Medication 324 MG: at 08:08

## 2024-08-09 RX ADMIN — NASAL 2 SPRAY: 6.5 SPRAY NASAL at 06:08

## 2024-08-09 RX ADMIN — CEPHALEXIN 500 MG: 500 CAPSULE ORAL at 06:08

## 2024-08-09 RX ADMIN — BUMETANIDE 2 MG: 1 TABLET ORAL at 08:08

## 2024-08-09 RX ADMIN — ALLOPURINOL 100 MG: 100 TABLET ORAL at 08:08

## 2024-08-09 RX ADMIN — METOPROLOL SUCCINATE 12.5 MG: 25 TABLET, EXTENDED RELEASE ORAL at 08:08

## 2024-08-09 NOTE — PROGRESS NOTES
Abdulkadir Duval - Surgery ([facility])  [facility] Medicine  Progress Note    Patient Name: Dariel Urbina  MRN: [id number]  Patient Class: IP- Inpatient   Admission Date: 8/5/2024  Length of Stay: 4 days  Attending Physician: Sina Childers MD  Primary Care Provider: Devon Langston Jr., MD        Subjective:     Principal Problem:Recurrent epistaxis        HPI:  Patient is an 83 year old male with a PMHx of CHF, CKD, mechanical heart valve on Coumadin, recurrent epistaxis, anemia, NSTEMI, PVD and type 2 diabetes being admitted to medicine for recurrent epistaxis. Patient reports that epistaxis has been a recurrent issue for some time now. He is established with an ENT.  Patient states that he has daily nosebleeds a typically manages had home with compression with Afrin. Today he reports that his bleeding started approximately 10:00 p.m last night. He reports using almost half a bottle of Afrin to try to get the bleeding to stop and applying compression, but has been unsuccessful. Patient states that this amount of bleeding is unusual as it is causing him to gag and cough up the blood.  He states typically he is able to easily swallow clots that are dislodged. He does endorse some lightheadedness. He denies any chest pain or difficulty breathing.  He denies any abdominal pain, however he does endorse black tarry stools.  He has no other acute concerns at this time. Patient has an extensive surgical history attempting to resolve this issue.  Per patient and wife he has undergone multiple cauteries and ablation of blood vessels without success.    In the ED, vitals stable. Intake labs remarkable for Hgb 6.4, INR 2.8, Cr 2.1. He was type & screened, transfused 1 unit RBCs. ENT consulted and planning for IR embolization tomorrow on 8/6.    Overview/Hospital Course:  Patient admitted for recurrent significant epistaxis requiring blood transfusions. Warfarin held on 8/5. Required 2nd unit of blood on 8/6 prior to going to OR  for embolization. S/p IR embolization of the right maxillary, 2 left maxillary collaterals, and a dominant left facial arteries on 8/7. Patient's hgb stable on 8/7. Okay to restart warfarin, pharmacy managing. Plan for OR with ENT 8/8 for nasal endoscopy.     8/8 PMHx of CHF, CKD, mechanical heart valve on Coumadin, recurrent epistaxis, anemia, NSTEMI, PVD and type 2 diabetes - Patient admitted for recurrent significant epistaxis requiring blood transfusions. Warfarin held on 8/5. Required 2nd unit of blood on 8/6 prior to going to OR for embolization. S/p IR embolization of the right maxillary, 2 left maxillary collaterals, and a dominant left facial arteries on 8/7. Patient's hgb stable on 8/7. Okay to restart warfarin, pharmacy managing.  No epistaxis. OR with ENT today  for nasal endoscopy. continue keflex while packing is in place. Afrin to bilateral nares q8h for the next 48h. packs removed. ENT  cauterized  very superficial oozing on the left side - no packing in - irrigated his nasal passages well. okay to restart all blood thinners from ENT perspective outpatient follow up   8/9 Hb stable at 7.9. keflex discontinued .  ok for discharge for ENT perspective. Discharge with Nasal saline spray q4h WA applied into nasal cavities daily with nightly application of vaseline/aquaphor to anterior nares.  Keep head of bed elevated. Discharge with ENT follow up       Review of Systems:   Pain scale:  Constitutional:  fever,  chills, headache, vision loss, hearing loss, weight loss, Generalized weakness, falls, loss of smell, loss of taste, poor appetite,  sore throat  Respiratory: cough, shortness of breath.   Cardiovascular: chest pain, dizziness, palpitations, orthopnea, swelling of feet, syncope  Gastrointestinal: nausea, vomiting, abdominal pain, diarrhea, black stool,  blood in stool, change in bowel habits, constipation  Genitourinary: hematuria, dysuria, urgency, frequency  Integument/Breast: rash,   pruritis  Hematologic/Lymphatic: easy bruising, lymphadenopathy  Musculoskeletal: arthralgias , myalgias, back pain, neck pain, knee pain  Neurological: confusion, seizures, tremors, slurred speech  Behavioral/Psych:  depression, anxiety, auditory or visual hallucinations     OBJECTIVE:     Physical Exam:  Body mass index is 25.42 kg/m².    Constitutional: Appears well-developed and well-nourished.   Head: Normocephalic and atraumatic.   Neck: Normal range of motion. Neck supple.   Cardiovascular: Normal heart rate.  Regular heart rhythm.  Pulmonary/Chest: Effort normal.   Abdominal: No distension.  No tenderness  Musculoskeletal: Normal range of motion. No edema.   Neurological: Alert and oriented to person, place, and time.   Skin: Skin is warm and dry.   Psychiatric: Normal mood and affect. Behavior is normal.                  Vital Signs  Temp: 97.9 °F (36.6 °C) (08/09/24 0413)  Pulse: (!) 57 (08/09/24 0413)  Resp: 18 (08/09/24 0413)  BP: (!) 132/58 (08/09/24 0413)  SpO2: 98 % (08/09/24 0413)     24 Hour VS Range    Temp:  [97.5 °F (36.4 °C)-98.2 °F (36.8 °C)]   Pulse:  [50-66]   Resp:  [11-18]   BP: (113-160)/(53-64)   SpO2:  [95 %-100 %]     Intake/Output Summary (Last 24 hours) at 8/9/2024 0715  Last data filed at 8/8/2024 1050  Gross per 24 hour   Intake 470 ml   Output --   Net 470 ml         I/O This Shift:  No intake/output data recorded.    Wt Readings from Last 3 Encounters:   08/08/24 69.3 kg (152 lb 12.5 oz)   07/16/24 69.3 kg (152 lb 12.5 oz)   06/25/24 71.7 kg (158 lb)       I have personally reviewed the vitals and recorded Intake/Output     Laboratory/Diagnostic Data:    CBC/Anemia Labs: Coags:    Recent Labs   Lab 08/07/24  0324 08/08/24  0535 08/09/24  0517   WBC 3.63* 7.08 7.40   HGB 8.0* 7.6* 7.9*   HCT 26.2* 24.5* 26.2*   * 149* 152   MCV 98 97 100*   RDW 17.9* 17.6* 18.0*    Recent Labs   Lab 08/05/24  0409 08/06/24  0223 08/07/24  0324 08/08/24  0535 08/09/24  0517   INR 2.8*   < >  "2.3* 2.6* 2.7*   APTT 35.2*  --   --   --   --     < > = values in this interval not displayed.        Chemistries: ABG:   Recent Labs   Lab 08/05/24  0409 08/06/24  0223 08/07/24  0324 08/08/24  0535 08/09/24  0517      < > 138 136 135*   K 4.3   < > 4.7 4.5 5.1      < > 113* 108 108   CO2 21*   < > 14* 19* 19*   BUN 48*   < > 41* 47* 45*   CREATININE 2.1*   < > 1.7* 2.0* 1.9*   CALCIUM 9.8   < > 9.4 9.9 9.9   PROT 6.6  --   --   --   --    BILITOT 0.4  --   --   --   --    ALKPHOS 78  --   --   --   --    ALT 14  --   --   --   --    AST 16  --   --   --   --     < > = values in this interval not displayed.    No results for input(s): "PH", "PCO2", "PO2", "HCO3", "POCSATURATED", "BE" in the last 168 hours.     POCT Glucose: HbA1c:    Recent Labs   Lab 08/05/24  2017 08/06/24  0741 08/08/24  1038   POCTGLUCOSE 83 80 75    Hemoglobin A1C   Date Value Ref Range Status   05/30/2024 5.3 4.0 - 5.6 % Final     Comment:     ADA Screening Guidelines:  5.7-6.4%  Consistent with prediabetes  >or=6.5%  Consistent with diabetes    High levels of fetal hemoglobin interfere with the HbA1C  assay. Heterozygous hemoglobin variants (HbS, HgC, etc)do  not significantly interfere with this assay.   However, presence of multiple variants may affect accuracy.     05/29/2024 5.5 4.0 - 5.6 % Final     Comment:     ADA Screening Guidelines:  5.7-6.4%  Consistent with prediabetes  >or=6.5%  Consistent with diabetes    High levels of fetal hemoglobin interfere with the HbA1C  assay. Heterozygous hemoglobin variants (HbS, HgC, etc)do  not significantly interfere with this assay.   However, presence of multiple variants may affect accuracy.     03/28/2024 5.9 (H) 4.0 - 5.6 % Final     Comment:     ADA Screening Guidelines:  5.7-6.4%  Consistent with prediabetes  >or=6.5%  Consistent with diabetes    High levels of fetal hemoglobin interfere with the HbA1C  assay. Heterozygous hemoglobin variants (HbS, HgC, etc)do  not " "significantly interfere with this assay.   However, presence of multiple variants may affect accuracy.          Cardiac Enzymes: Ejection Fractions:    No results for input(s): "CPK", "CPKMB", "MB", "TROPONINI" in the last 72 hours. EF   Date Value Ref Range Status   04/14/2023 60 % Final   12/30/2022 48 % Final     Nuc Stress EF   Date Value Ref Range Status   01/17/2023 46 % Final     Nuc Rest EF   Date Value Ref Range Status   01/17/2023 46  Final          No results for input(s): "COLORU", "APPEARANCEUA", "PHUR", "SPECGRAV", "PROTEINUA", "GLUCUA", "KETONESU", "BILIRUBINUA", "OCCULTUA", "NITRITE", "UROBILINOGEN", "LEUKOCYTESUR", "RBCUA", "WBCUA", "BACTERIA", "SQUAMEPITHEL", "HYALINECASTS" in the last 48 hours.    Invalid input(s): "WRIGHTSUR"    Procalcitonin (ng/mL)   Date Value   03/19/2018 0.20     Lactate (Lactic Acid) (mmol/L)   Date Value   09/29/2022 0.7   04/01/2018 0.6   03/19/2018 0.8     BNP (pg/mL)   Date Value   06/12/2024 808 (H)   05/29/2024 1,073 (H)   12/15/2023 793 (H)   04/12/2023 354 (H)   12/29/2022 1,188 (H)     CRP (mg/L)   Date Value   05/25/2010 3.4     Sed Rate (mm/hr)   Date Value   05/25/2010 57 (H)   06/01/2004 48 (H)     D-Dimer (mg/L FEU)   Date Value   12/29/2022 1.66 (H)     Ferritin (ng/mL)   Date Value   11/07/2023 41   08/21/2023 55   06/28/2023 41   05/31/2023 42   01/09/2023 71   10/20/2022 82   03/15/2022 61   11/19/2019 128   04/02/2018 267   10/12/2017 205     No results found for: "LDH"  Troponin I (ng/mL)   Date Value   06/01/2024 0.495 (H)   06/01/2024 0.566 (H)   05/31/2024 0.338 (H)   05/31/2024 0.101 (H)   05/31/2024 0.057 (H)   05/29/2024 0.034 (H)   12/15/2023 0.047 (H)   12/15/2023 0.055 (H)     CPK (U/L)   Date Value   04/13/2023 96   04/11/2008 126   03/11/2008 325 (H)   08/08/2005 177   06/27/2005 145   06/01/2004 145     Results for orders placed or performed in visit on 03/15/22   Vitamin D   Result Value Ref Range    Vit D, 25-Hydroxy 25 (L) 30 - 96 ng/mL "     *Note: Due to a large number of results and/or encounters for the requested time period, some results have not been displayed. A complete set of results can be found in Results Review.     SARS-CoV2 (COVID-19) Qualitative PCR (no units)   Date Value   01/26/2022 Detected (A)   10/01/2021 Not Detected   05/18/2020 Not Detected       Microbiology labs for the last week  Microbiology Results (last 7 days)       ** No results found for the last 168 hours. **            Reviewed and noted in plan where applicable- Please see chart for full lab data.    Lines/Drains:       Peripheral IV - Single Lumen 08/05/24 0701 20 G Anterior;Left Forearm (Active)   Site Assessment Clean;Dry;Intact;No redness;No swelling;No warmth;No drainage 08/08/24 0400   Extremity Assessment Distal to IV No abnormal discoloration;No redness;No swelling;No warmth 08/07/24 2140   Line Status Saline locked 08/08/24 0400   Dressing Status Clean;Dry;Intact 08/08/24 0400   Dressing Intervention Integrity maintained 08/08/24 0400   Dressing Change Due 08/09/24 08/07/24 2140   Site Change Due 08/09/24 08/07/24 2140   Reason Not Rotated Not due 08/07/24 2140   Number of days: 3            Peripheral IV - Single Lumen 08/06/24 1223 20 G Posterior;Right Hand (Active)   Site Assessment Clean;Dry;Intact;No redness;No swelling;No warmth;No drainage 08/08/24 0400   Extremity Assessment Distal to IV No abnormal discoloration;No redness;No swelling;No warmth 08/07/24 2140   Line Status Saline locked 08/08/24 0400   Dressing Status Clean;Dry;Intact 08/08/24 0400   Dressing Intervention Integrity maintained 08/08/24 0400   Dressing Change Due 08/10/24 08/07/24 2140   Site Change Due 08/10/24 08/07/24 2140   Reason Not Rotated Not due 08/07/24 2140   Number of days: 1       Imaging  ECG Results              EKG 12-lead (Final result)        Collection Time Result Time QRS Duration OHS QTC Calculation    08/05/24 02:39:03 08/05/24 11:32:46 154 499                      Final result by Interface, [facility] (08/05/24 11:32:49)                   Narrative:    Test Reason : R58,    Vent. Rate : 066 BPM     Atrial Rate : 000 BPM     P-R Int : 000 ms          QRS Dur : 154 ms      QT Int : 476 ms       P-R-T Axes : 000 -16 007 degrees     QTc Int : 499 ms    Atrial fibrillation  Left bundle branch block  Abnormal ECG  When compared with ECG of 31-MAY-2024 09:14,  Questionable change in The axis  T wave inversion no longer evident in Lateral leads  Confirmed by Jeremias APARICIO MD ([id number]) on 8/5/2024 11:32:41 AM    Referred By: [email or web address]   SELF           Confirmed By:Jeremias APARICIO MD                                    Results for orders placed during the hospital encounter of 05/29/24    Echo    Interpretation Summary    Left Ventricle: The left ventricle is normal in size. Normal wall thickness. There is concentric hypertrophy. Septal motion is consistent with post-operative status. There is low normal systolic function with a visually estimated ejection fraction of 50 - 55%. Biplane (2D) method of discs ejection fraction is 51%.    Right Ventricle: Mild right ventricular enlargement. Wall thickness is normal. Systolic function is normal.    Left Atrium: Left atrium is severely dilated.    Right Atrium: Right atrium is severely dilated.    Aortic Valve: There is a mechanical valve in the aortic position. Aortic valve area by VTI is 0.87 cm². Aortic valve peak velocity is 3.02 m/s. Mean gradient is 19 mmHg. The dimensionless index is 0.31. There is trace aortic regurgitation.    Mitral Valve: There is mild bileaflet sclerosis. There is moderate mitral annular calcification present. There is moderate to severe regurgitation with a centrally directed jet.    Tricuspid Valve: There is moderate regurgitation.    Pulmonary Artery: There is moderate pulmonary hypertension. The estimated pulmonary artery systolic pressure is 57 mmHg.    IVC/SVC: Intermediate venous pressure at 8 mmHg.      IR  Angiogram Carotid Cerebral Bilateral inc Arch  Narrative: EXAM:    Cerebral angiogram.Refractory epistaxis PVA particle embolization.    CPT CODES:    Common carotid artery: 36224 x 2    External carotid artery: 36227 x 2    Additional selective branches: 36218 x 3    Embolization (non-CNS) Face/Neck: 61626 x 2; 75894 x 2    F/U Angio post embo: 75898 x 8    Closure device placement:     CLINICAL HISTORY AND INDICATION:    83 y.o. male with h/o recurrent epistaxis s/p bilateral SP ligation followed by IR embolization on 09/18/23, bilateral facial and internal maxillary artery embolization on 11/03/2023, 12/18/23, left AEA ligation on 2/28/24, and bilateral endoscopic control/cauterization in OR on 6/18/24 who presents for recurrent epistaxis. Pt is on coumadin with therapeutic INR for mechanical heart valve. ENT has planned endoscopy on thursday and would like repeat angiogram with possible intervention.    PROCEDURE COMMENT:    Informed consent was obtained from the patient's family prior to the examination. A timeout was performed.    Sedation: Patient was intubated prior to the procedure and general anesthesia provided by independent anesthesiology team. The patient was monitored throughout the procedure by an independent certified registered nursing anesthetist.    The right groin was prepared using standard sterile technique and a right common femoral artery puncture was performed with a micropuncture needle, under ultrasound guidance.  A 6F sheath was placed and connected to saline flush.  Through this sheath a 5F Ángel catheter was advanced over a wire and used to perform selective angiography of the following vessels: Right common femoral artery, right common and external carotid arteries, left common and external carotid arteries with selective external carotid artery branches angiogram bilaterally.    Right common femoral artery demonstrates luminal irregularity in the distal common femoral artery and  proximal deep peroneal branch indicative of severe atherosclerosis with mild flow-limiting stenosis.    FINDINGS AND INTERVENTION:    The right common carotid artery was selected and an angiogram was performed. Angiogram demonstrated luminal irregularity involving the proximal extracranial internal carotid artery segment and proximal external carotid artery segment.  No concern of any intraluminal floating thrombus/focal dissection.  Angiogram demonstrates bulge on the posterior wall of the proximal internal carotid artery segment serration measure 5 mm x 3 mm.  There is contrast stagnation and delayed contrast clearance noted from the ulceration in the plaque.  Internal carotid artery origin stenosis appears to be of mild severity (33% by NASCET criteria).    Right external carotid artery stenosis appears to be of moderate severity (6 by NASCET criteria).  External carotid artery demonstrates poststenotic dilatation after branching of superior thyroid artery.    Intracranial views demonstrate there is no intracranial flow delay.  Intracranial projections demonstrates filling of the right posterior cerebral artery directly and right middle cerebral artery branches.  The OTF is not seen from this injection.  There is good ophthalmic artery filling of the globe.    The right external carotid artery, followed by selective right internal maxillary artery angiogram was performed. Good flow is noted in all the major branches of the external carotid artery.  Distal branches from the internal maxillary artery demonstrates hypervascularity over posterior nasal fossa suggesting active extravasation.  Nasal branches appear are seen opacifying the nasal fossa appears under roadmapping guidance, trueselect microcatheter was advanced over synchro 0.014 microwire first into distal right internal maxillary artery.  Distal maxillary angiography showed good filling of the nasal mucosa with tortuous arteries.  There were no dangerous  collaterals from this branch.  Therefore, embolization was performed with 150-250 micron PVA particles until near stasis.  Microcatheter was pulled back into the common right internal maxillary artery trunk.  Follow-up right external carotid artery angiogram demonstrates preserved flow to all the branches of external carotid artery with the exception of the targeted internal maxillary feeders to posterior nasal fossa.    The left common carotid artery was selected and an angiogram was performed. Angiogram demonstrates excellent flow at the bifurcation without significant plaque buildup or stenosis.  Intracranial projection demonstrates good flow into the intracranial segments of the internal carotid artery with opacification of off the bilateral anterior cerebral arteries and left middle cerebral artery.  Angiogram is negative for any aneurysms or arteriovenous malformations.    The left external carotid artery angiogram was performed. Good flow is noted in all the major branches of the external carotid artery, with negative concerns for any atherosclerotic irregularity or stenosis. Prominent hypervascularity was noted in the nasal fossa, mostly from supplied by collateral branches from the internal maxillary artery, likely secondary to prior liquid + coil embolization.  Using roadmapping guidance, microcatheter was advanced over a microwire and a common origin of these feeding arteries 2 different branch arteries were selected followed by angiography showing no dangerous collaterals and sub sh embolization using 150-250 micron PVA particles.  Follow-up angiogram demonstrates no residual flow going to nasal fossa and preserved flow into the left middle meningeal artery that has branch going to the left orbit.    Follow-up external carotid angiography demonstrated a large left facial artery supplying the nasal fossa anteriorly.  Under road mapping guidance the left facial artery was selected distally.  Super  selective left facial artery angiogram was performed that demonstrated hypervascularity over nasal septum and inferior segment of nasal fossa.  Under roadmapping guidance, to select microcatheter was advanced into the left facial artery and tip of catheter was placed and the 1st segment of the facial artery.  This was followed by particle embolization using 150-250 micron PVA particles until near stasis was achieved.  Follow-up angiograms demonstrate no residue flow going to nasal fossa or septum indicative of satisfactory embolization.    The catheter was removed and a closure device was deployed in the right groin.    Hemostasis was obtained in the right groin using the 6 F Vascade device.    The total contrast dose for the procedure was: 102 cc of Visipaque.    Radiation dose:    Radiation DAP [id number]    Reference air kerma was 1385 mGy    Fluoro time was 28.1 minutes    Physicians in the procedure: Devon Aguilar MD    Fellow in the procedure: Armin Jaramillo MD, [facility]    Images and report were permanently recorded.  Impression: 1. Successful particle embolization of the right distal internal maxillary artery to the right nasal fossa.    2.  Successful particle embolization of the left nasal fossa via 2 collateral branches of the  maxillary artery and a large left facial artery.    3.  Left internal carotid artery atherosclerotic disease including and ulcerated plaque in the bulb.    Electronically signed by resident: Armin Jaramillo  Date:    08/07/2024  Time:    07:53    Electronically signed by: Devon Aguilar MD  Date:    08/07/2024  Time:    11:38      Labs, Imaging, EKG and Diagnostic results from 8/9/2024 were reviewed.    Medications:  Medication list was reviewed and changes noted under Assessment/Plan.  Current Facility-Administered Medications on File Prior to Encounter   Medication Dose Route Frequency Provider Last Rate Last Admin    ceFAZolin 2 g in dextrose 5 % in water (D5W) 50 mL IVPB  (MB+)  2 g Intravenous On Call Procedure Yenifer Sandoval MD        HYDROmorphone injection 0.2 mg  0.2 mg Intravenous Q5 Min PRN Saeed Farrell MD        sodium chloride 0.9% flush 10 mL  10 mL Intravenous PRN Saeed Farrell MD         Current Outpatient Medications on File Prior to Encounter   Medication Sig Dispense Refill    acetaminophen (TYLENOL) 500 MG tablet Take 500 mg by mouth daily as needed for Pain.      allopurinoL (ZYLOPRIM) 100 MG tablet Take 1 tablet (100 mg total) by mouth once daily. 90 tablet 3    bumetanide (BUMEX) 1 MG tablet Take 2 tablets (2 mg total) by mouth every other day. 90 tablet 3    ferrous gluconate (FERGON) 324 MG tablet TAKE 1 TABLET EVERY DAY WITH BREAKFAST 90 tablet 3    isosorbide mononitrate (IMDUR) 60 MG 24 hr tablet Take 1 tablet (60 mg total) by mouth every evening. 90 tablet 3    metoprolol succinate (TOPROL-XL) 25 MG 24 hr tablet TAKE 1 TABLET ONE TIME DAILY 90 tablet 3    omega-3 fatty acids/fish oil (FISH OIL-OMEGA-3 FATTY ACIDS) 300-1,000 mg capsule Take 1 capsule by mouth once daily.      rosuvastatin (CRESTOR) 40 MG Tab TAKE 1 TABLET EVERY EVENING. 90 tablet 3    sodium chloride (OCEAN) 0.65 % nasal spray 1 spray by Nasal route as needed for Congestion. 44 mL 12    sodium chloride (SALINE NASAL) 0.65 % nasal spray 2 sprays by Nasal route 3 (three) times daily. 44 mL 3    traZODone (DESYREL) 50 MG tablet Take 1 tablet (50 mg total) by mouth every evening. 90 tablet 2    warfarin (COUMADIN) 5 MG tablet TAKE 1/2 TABLET (2.5MG) SATURDAY, THEN TAKE 1 TABLET (5MG) ALL OTHER DAYS OR AS INSTRUCTED BY COUMADIN CLINIC (Patient taking differently: Take 5 mg by mouth every evening.) 90 tablet 3     Scheduled Medications:  Current Facility-Administered Medications   Medication Dose Route Frequency    allopurinoL  100 mg Oral Daily    bumetanide  2 mg Oral Every other day    ferrous gluconate  324 mg Oral Daily with breakfast    isosorbide mononitrate  60 mg Oral QHS     metoprolol succinate  12.5 mg Oral Daily    polyethylene glycol  17 g Oral Daily    sodium chloride  2 spray Each Nostril Q4H    warfarin  2.5 mg Oral Every Mon, Fri    warfarin  5 mg Oral Every Tues, Thurs, Sat, Sun    [START ON 8/14/2024] warfarin  5 mg Oral Every Wed     PRN:   Current Facility-Administered Medications:     0.9%  NaCl infusion (for blood administration), , Intravenous, Q24H PRN    0.9%  NaCl infusion (for blood administration), , Intravenous, Q24H PRN    acetaminophen, 1,000 mg, Oral, Q8H PRN    acetaminophen, 650 mg, Oral, Q4H PRN    albuterol-ipratropium, 3 mL, Nebulization, Q4H PRN    aluminum-magnesium hydroxide-simethicone, 30 mL, Oral, QID PRN    bisacodyL, 10 mg, Rectal, Daily PRN    glucagon (human recombinant), 1 mg, Intramuscular, PRN    glucose, 16 g, Oral, PRN    glucose, 24 g, Oral, PRN    hydrALAZINE, 25 mg, Oral, Q6H PRN    melatonin, 6 mg, Oral, Nightly PRN    naloxone, 0.02 mg, Intravenous, PRN    ondansetron, 8 mg, Oral, Q8H PRN    prochlorperazine, 5 mg, Intravenous, Q6H PRN    simethicone, 1 tablet, Oral, QID PRN    sodium chloride, 1 spray, Each Nostril, PRN    sodium chloride 0.9%, 10 mL, Intravenous, PRN    sodium chloride 0.9%, 5 mL, Intravenous, PRN  Infusions:   Estimated Creatinine Clearance: 25.6 mL/min (A) (based on SCr of 1.9 mg/dL (H)).             Assessment/Plan:      * Recurrent epistaxis  + Acute blood loss anemia  Patient presents with recurrent epistaxis, follows closely outpatient with ENT. Hgb 6.4 on presentation. Given 1u pRBC on 6/5. Second unit on 8/6. S/p IR embolization of the right maxillary, 2 left maxillary collaterals, and a dominant left facial arteries on 8/7. Patient's hgb stable on 8/7. Okay to restart warfarin, pharmacy managing. Plan for OR with ENT 8/8 for nasal endoscopy.   - trend CBC  - Afrin q8h bilateral nares + q4h saline spray to packing  - Keflex for staph coverage while packing in place  - ENT consulted > procedure planned 8/8  - HOB  elevated  8/8 PMHx of CHF, CKD, mechanical heart valve on Coumadin, recurrent epistaxis, anemia, NSTEMI, PVD and type 2 diabetes - Patient admitted for recurrent significant epistaxis requiring blood transfusions. Warfarin held on 8/5. Required 2nd unit of blood on 8/6 prior to going to OR for embolization. S/p IR embolization of the right maxillary, 2 left maxillary collaterals, and a dominant left facial arteries on 8/7. Patient's hgb stable on 8/7. Okay to restart warfarin, pharmacy managing.  No epistaxis. Plan for OR with ENT today  for nasal endoscopy. continue keflex while packing is in place. Afrin to bilateral nares q8h for the next 48h.   8/8 PMHx of CHF, CKD, mechanical heart valve on Coumadin, recurrent epistaxis, anemia, NSTEMI, PVD and type 2 diabetes - Patient admitted for recurrent significant epistaxis requiring blood transfusions. Warfarin held on 8/5. Required 2nd unit of blood on 8/6 prior to going to OR for embolization. S/p IR embolization of the right maxillary, 2 left maxillary collaterals, and a dominant left facial arteries on 8/7. Patient's hgb stable on 8/7. Okay to restart warfarin, pharmacy managing.  No epistaxis. OR with ENT today  for nasal endoscopy. continue keflex while packing is in place. Afrin to bilateral nares q8h for the next 48h. packs removed. ENT  cauterized  very superficial oozing on the left side - no packing in - irrigated his nasal passages well. okay to restart all blood thinners from ENT perspective outpatient follow up   8/9 Hb stable at 7.9. keflex discontinued .  ok for discharge for ENT perspective. Discharge with Nasal saline spray q4h WA applied into nasal cavities daily with nightly application of vaseline/aquaphor to anterior nares.  Keep head of bed elevated. Discharge with ENT follow up      Paroxysmal atrial fibrillation  Patient with Paroxysmal (<7 days) atrial fibrillation which is controlled currently with Beta Blocker. Patient is currently in sinus  rhythm.[id number] Score: 4.  Anticoagulation indicated. Anticoagulation done with coumadin .      Coronary artery disease involving native coronary artery of native heart without angina pectoris  s/p CABG     Acute blood loss anemia  Anemia is likely due to acute blood loss which was from epistaxis . Most recent hemoglobin and hematocrit are listed below.  Recent Labs     08/07/24  0324 08/08/24  0535 08/09/24  0517   HGB 8.0* 7.6* 7.9*   HCT 26.2* 24.5* 26.2*     Plan  - Monitor serial CBC: Daily  - Transfuse PRBC if patient becomes hemodynamically unstable, symptomatic or H/H drops below 7/21.  - Patient has received 2 units of PRBCs on 8/5, 8/6  - resuming warfarin on 8/7    Type 2 diabetes mellitus with stage 4 chronic kidney disease, without long-term current use of insulin  Creatine stable for now. BMP reviewed- noted Estimated Creatinine Clearance: 24.3 mL/min (A) (based on SCr of 2 mg/dL (H)). according to latest data. 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  Monitor UOP and serial BMP and adjust therapy as needed. Renally dose meds. Avoid nephrotoxic medications and procedures.  - low dose SSI, ACHS  - diabetic diet    Chronic anticoagulation  On coumadin for mechanical heart valve  - daily INR  - pharmacy consulted  - restarting warfarin 8/7   patient with INR   Recent Labs   Lab 08/07/24  0324 08/08/24  0535 08/09/24  0517   INR 2.3* 2.6* 2.7*         VTE Risk Mitigation (From admission, onward)           Ordered     warfarin (COUMADIN) tablet 5 mg  Every Wednesday 08/08/24 1439     warfarin (COUMADIN) tablet 2.5 mg  Every Mon, Fri 08/08/24 1439     warfarin (COUMADIN) tablet 5 mg  Every Tues, Thurs, Sat, Sun         08/08/24 1439     IP VTE LOW RISK PATIENT  Once         08/05/24 0941     Place sequential compression device  Until discontinued         08/05/24 0941                    Discharge Planning   ASTER: 8/9/2024     Code Status: Full Code   Is the patient medically  ready for discharge?:     Reason for patient still in hospital (select all that apply): Treatment  Discharge Plan A: Home with family                  Sina Childers MD  Department of Hospital Medicine   [facility] - Surgery (2nd Fl)

## 2024-08-09 NOTE — PLAN OF CARE
CM spoke with pt in room.  Dispo: home, his wife will give him a ride around 11.  No DME needed.    ADDIS SchaeferN, BS, RN, CCM

## 2024-08-09 NOTE — ASSESSMENT & PLAN NOTE
Anemia is likely due to acute blood loss which was from epistaxis . Most recent hemoglobin and hematocrit are listed below.  Recent Labs     08/07/24  0324 08/08/24  0535 08/09/24  0517   HGB 8.0* 7.6* 7.9*   HCT 26.2* 24.5* 26.2*     Plan  - Monitor serial CBC: Daily  - Transfuse PRBC if patient becomes hemodynamically unstable, symptomatic or H/H drops below 7/21.  - Patient has received 2 units of PRBCs on 8/5, 8/6  - resuming warfarin on 8/7

## 2024-08-09 NOTE — PROGRESS NOTES
[facility] - Med Surg ([place] [address])  Otorhinolaryngology-Head & Neck Surgery  Progress Note    Subjective:     Post-Op Info:  Procedure(s) (LRB):  CONTROL OF EPISTAXIS, POSTERIOR, USING NASAL PACKING OR CAUTERIZATION (Bilateral)  ENDOSCOPY, NOSE (N/A)   1 Day Post-Op  Hospital Day: 5     Interval History: POD 1 sp nasal endoscopy and cautery. No epistaxis ovn.     Medications:  Continuous Infusions:  Scheduled Meds:   allopurinoL  100 mg Oral Daily    bumetanide  2 mg Oral Every other day    cephALEXin  500 mg Oral Q8H    ferrous gluconate  324 mg Oral Daily with breakfast    isosorbide mononitrate  60 mg Oral QHS    metoprolol succinate  12.5 mg Oral Daily    polyethylene glycol  17 g Oral Daily    sodium chloride  2 spray Each Nostril Q4H    warfarin  2.5 mg Oral Every Mon, Fri    warfarin  5 mg Oral Every Tues, Thurs, Sat, Sun    [START ON 8/14/2024] warfarin  5 mg Oral Every Wed     PRN Meds:  Current Facility-Administered Medications:     0.9%  NaCl infusion (for blood administration), , Intravenous, Q24H PRN    0.9%  NaCl infusion (for blood administration), , Intravenous, Q24H PRN    acetaminophen, 1,000 mg, Oral, Q8H PRN    acetaminophen, 650 mg, Oral, Q4H PRN    albuterol-ipratropium, 3 mL, Nebulization, Q4H PRN    aluminum-magnesium hydroxide-simethicone, 30 mL, Oral, QID PRN    bisacodyL, 10 mg, Rectal, Daily PRN    glucagon (human recombinant), 1 mg, Intramuscular, PRN    glucose, 16 g, Oral, PRN    glucose, 24 g, Oral, PRN    hydrALAZINE, 25 mg, Oral, Q6H PRN    melatonin, 6 mg, Oral, Nightly PRN    naloxone, 0.02 mg, Intravenous, PRN    ondansetron, 8 mg, Oral, Q8H PRN    prochlorperazine, 5 mg, Intravenous, Q6H PRN    simethicone, 1 tablet, Oral, QID PRN    sodium chloride, 1 spray, Each Nostril, PRN    sodium chloride 0.9%, 10 mL, Intravenous, PRN    sodium chloride 0.9%, 5 mL, Intravenous, PRN     Review of patient's allergies indicates:   Allergen Reactions    Lisinopril     Losartan       Intolerance- elevates potassium level     Objective:     Vital Signs (24h Range):  Temp:  [97.5 °F (36.4 °C)-98.2 °F (36.8 °C)] 97.9 °F (36.6 °C)  Pulse:  [50-66] 57  Resp:  [11-18] 18  SpO2:  [95 %-100 %] 98 %  BP: (113-160)/(53-64) 132/58       Lines/Drains/Airways       Peripheral Intravenous Line  Duration                  Peripheral IV - Single Lumen 08/05/24 0701 20 G Anterior;Left Forearm 4 days         Peripheral IV - Single Lumen 08/06/24 1223 20 G Posterior;Right Hand 2 days                     Physical Exam   NAD  Awake and alert  Nasal packing removed. No epistaxis  OP without any clot   Normal WOB, no stridor or stertor  Significant Labs:  BMP:   Recent Labs   Lab 08/09/24  0517   *      CO2 19*   BUN 45*   CREATININE 1.9*   CALCIUM 9.9     CBC:   Recent Labs   Lab 08/09/24  0517   WBC 7.40   RBC 2.62*   HGB 7.9*   HCT 26.2*      *   MCH 30.2   MCHC 30.2*       Significant Diagnostics:  I have reviewed all pertinent imaging results/findings within the past 24 hours.  Assessment/Plan:     * Recurrent epistaxis  Dariel Urbina is a 84 y.o. male evaluated for Epistaxis; Acute blood loss anemia; Bleeding; Chest pain on 8/5/2024. Well known to the ENT service for recurrent epistaxis requiring surgical intervention. 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 Previous surgical hx for control of epistaxis with bilateral SP ligation followed by embolization, bilateral facial and internal maxillary artery embolization on 11/03/2023, 12/18/23, left AEA ligation on 2/28/24, and bilateral endoscopic control/cauterization in OR on 6/18/24.     Now with left rhinorocket placed by ED, right merocel placed by ENT. Receiving 1u pRBC given Hgb 6.4. INR therapeutic at 2.8.  Admitted to hospital medicine for obs with packing in place and assist to optimize patient as appropriate prior to surgery     S/p IR embolization of right maxillary, 2 left maxillary collaterals, and a dominant left facial  with IR 8/7. Now s/p OR nasal endoscopy with packing removal 8/8 with only minor oozing noted on within left nasal cavity along septum, nasal floor, and one region of lateral nasal wall.     - ok for discharge for ENT perspective. Will arrange follow up.  - Discharge with Nasal saline spray q4h WA applied into nasal cavities daily with nightly application of vaseline/aquaphor to anterior nares  - Keep head of bed elevated  -- Continue anticoagulation as directed by medicine/cardiology teams   -- Remainder of care per medicine  -- Please page ENT on call with any additional questions or concerns             Manny Mccartney MD  Otorhinolaryngology-Head & Neck Surgery  [facility] - Med Surg ([facility]-16)

## 2024-08-09 NOTE — PLAN OF CARE
[facility] - Med Surg ([facility]-16)  Discharge Final Note    Primary Care Provider: Devon Langston Jr., MD    Expected Discharge Date: 8/9/2024    Final Discharge Note (most recent)       Final Note - 08/09/24 1603          Final Note    Assessment Type Final Discharge Note (P)      Anticipated Discharge Disposition Home or Self Care (P)         Post-Acute Status    Discharge Delays None known at this time (P)                      Important Message from Medicare   Pt d/c'd home with ride from wife.    Juli Moore, ADDISN, BS, RN, CCM

## 2024-08-09 NOTE — ASSESSMENT & PLAN NOTE
+ Acute blood loss anemia  Patient presents with recurrent epistaxis, follows closely outpatient with ENT. Hgb 6.4 on presentation. Given 1u pRBC on 6/5. Second unit on 8/6. S/p IR embolization of the right maxillary, 2 left maxillary collaterals, and a dominant left facial arteries on 8/7. Patient's hgb stable on 8/7. Okay to restart warfarin, pharmacy managing. Plan for OR with ENT 8/8 for nasal endoscopy.   - trend CBC  - Afrin q8h bilateral nares + q4h saline spray to packing  - Keflex for staph coverage while packing in place  - ENT consulted > procedure planned 8/8  - HOB elevated  8/8 PMHx of CHF, CKD, mechanical heart valve on Coumadin, recurrent epistaxis, anemia, NSTEMI, PVD and type 2 diabetes - Patient admitted for recurrent significant epistaxis requiring blood transfusions. Warfarin held on 8/5. Required 2nd unit of blood on 8/6 prior to going to OR for embolization. S/p IR embolization of the right maxillary, 2 left maxillary collaterals, and a dominant left facial arteries on 8/7. Patient's hgb stable on 8/7. Okay to restart warfarin, pharmacy managing.  No epistaxis. Plan for OR with ENT today  for nasal endoscopy. continue keflex while packing is in place. Afrin to bilateral nares q8h for the next 48h.   8/8 PMHx of CHF, CKD, mechanical heart valve on Coumadin, recurrent epistaxis, anemia, NSTEMI, PVD and type 2 diabetes - Patient admitted for recurrent significant epistaxis requiring blood transfusions. Warfarin held on 8/5. Required 2nd unit of blood on 8/6 prior to going to OR for embolization. S/p IR embolization of the right maxillary, 2 left maxillary collaterals, and a dominant left facial arteries on 8/7. Patient's hgb stable on 8/7. Okay to restart warfarin, pharmacy managing.  No epistaxis. OR with ENT today  for nasal endoscopy. continue keflex while packing is in place. Afrin to bilateral nares q8h for the next 48h. packs removed. ENT  cauterized  very superficial oozing on the left  side - no packing in - irrigated his nasal passages well. okay to restart all blood thinners from ENT perspective outpatient follow up   8/9 Hb stable at 7.9. keflex discontinued .  ok for discharge for ENT perspective. Discharge with Nasal saline spray q4h WA applied into nasal cavities daily with nightly application of vaseline/aquaphor to anterior nares.  Keep head of bed elevated. Discharge with ENT follow up

## 2024-08-09 NOTE — SUBJECTIVE & OBJECTIVE
Interval History: POD 1 sp nasal endoscopy and cautery. No epistaxis ovn.     Medications:  Continuous Infusions:  Scheduled Meds:   allopurinoL  100 mg Oral Daily    bumetanide  2 mg Oral Every other day    cephALEXin  500 mg Oral Q8H    ferrous gluconate  324 mg Oral Daily with breakfast    isosorbide mononitrate  60 mg Oral QHS    metoprolol succinate  12.5 mg Oral Daily    polyethylene glycol  17 g Oral Daily    sodium chloride  2 spray Each Nostril Q4H    warfarin  2.5 mg Oral Every Mon, Fri    warfarin  5 mg Oral Every Tues, Thurs, Sat, Sun    [START ON 8/14/2024] warfarin  5 mg Oral Every Wed     PRN Meds:  Current Facility-Administered Medications:     0.9%  NaCl infusion (for blood administration), , Intravenous, Q24H PRN    0.9%  NaCl infusion (for blood administration), , Intravenous, Q24H PRN    acetaminophen, 1,000 mg, Oral, Q8H PRN    acetaminophen, 650 mg, Oral, Q4H PRN    albuterol-ipratropium, 3 mL, Nebulization, Q4H PRN    aluminum-magnesium hydroxide-simethicone, 30 mL, Oral, QID PRN    bisacodyL, 10 mg, Rectal, Daily PRN    glucagon (human recombinant), 1 mg, Intramuscular, PRN    glucose, 16 g, Oral, PRN    glucose, 24 g, Oral, PRN    hydrALAZINE, 25 mg, Oral, Q6H PRN    melatonin, 6 mg, Oral, Nightly PRN    naloxone, 0.02 mg, Intravenous, PRN    ondansetron, 8 mg, Oral, Q8H PRN    prochlorperazine, 5 mg, Intravenous, Q6H PRN    simethicone, 1 tablet, Oral, QID PRN    sodium chloride, 1 spray, Each Nostril, PRN    sodium chloride 0.9%, 10 mL, Intravenous, PRN    sodium chloride 0.9%, 5 mL, Intravenous, PRN     Review of patient's allergies indicates:   Allergen Reactions    Lisinopril     Losartan      Intolerance- elevates potassium level     Objective:     Vital Signs (24h Range):  Temp:  [97.5 °F (36.4 °C)-98.2 °F (36.8 °C)] 97.9 °F (36.6 °C)  Pulse:  [50-66] 57  Resp:  [11-18] 18  SpO2:  [95 %-100 %] 98 %  BP: (113-160)/(53-64) 132/58       Lines/Drains/Airways       Peripheral Intravenous  Line  Duration                  Peripheral IV - Single Lumen 08/05/24 0701 20 G Anterior;Left Forearm 4 days         Peripheral IV - Single Lumen 08/06/24 1223 20 G Posterior;Right Hand 2 days                     Physical Exam     Significant Labs:  BMP:   Recent Labs   Lab 08/09/24  0517   *      CO2 19*   BUN 45*   CREATININE 1.9*   CALCIUM 9.9     CBC:   Recent Labs   Lab 08/09/24  0517   WBC 7.40   RBC 2.62*   HGB 7.9*   HCT 26.2*      *   MCH 30.2   MCHC 30.2*       Significant Diagnostics:  I have reviewed all pertinent imaging results/findings within the past 24 hours.

## 2024-08-09 NOTE — CONSULTS
PHARMACY CONSULT NOTE: WARFARIN  Dariel Urbina is a 84 y.o. male on warfarin therapy for Mechanical Heart Valve (aortic). PharmD has been consulted for warfarin dosing.    Current order: Warfarin  2.5 mg (5 mg x 0.5) every Mon, Fri; 5 mg (5 mg x 1) all other days   Home dose: Warfarin  2.5 mg (5 mg x 0.5) every Mon, Fri; 5 mg (5 mg x 1) all other days   Coumadin clinic enrollment: Active  INR goal: 2-3    Lab Results   Component Value Date    INR 2.7 (H) 08/09/2024    INR 2.6 (H) 08/08/2024    INR 2.3 (H) 08/07/2024     Significant drug interactions: no major interactions noted  Diet:  Low sodium      Recommendation(s):   INR remains therapeutic  Continue home warfarin dose  PT/INR daily while admitted   Pharmacy will continue to follow and monitor warfarin    Thank you for the consult,  Teddy Blanco, PharmD  Ext: [phone]    **Note: Consults are reviewed Monday-Friday 7:00am-3:30pm. THE ABOVE RECOMMENDATIONS ARE ONLY SUGGESTED.THE RECOMMENDATIONS SHOULD BE CONSIDERED IN CONJUNCTION WITH ALL PATIENT FACTORS. **

## 2024-08-09 NOTE — PLAN OF CARE
Pcp scheduled   Future Appointments   Date Time Provider Department Center   8/14/2024  9:10 AM LAB, APPOINTMENT [facility] INTMED [facility] LAB IM [facility]   8/16/2024  3:00 PM Jono Russell MD [facility] HNSO [facility]   9/3/2024 11:40 AM Mabel Birmingham MD [facility]

## 2024-08-13 ENCOUNTER — OUTPATIENT CASE MANAGEMENT (OUTPATIENT)
Dept: ADMINISTRATIVE | Facility: OTHER | Age: 84
End: 2024-08-13
Payer: MEDICARE

## 2024-08-13 NOTE — ANESTHESIA POSTPROCEDURE EVALUATION
Anesthesia Post Evaluation    Patient: Dariel Urbina    Procedure(s) Performed: Procedure(s) (LRB):  CONTROL OF EPISTAXIS, POSTERIOR, USING NASAL PACKING OR CAUTERIZATION (Bilateral)  ENDOSCOPY, NOSE (N/A)    Final Anesthesia Type: general      Patient location during evaluation: floor  Patient participation: Yes- Able to Participate  Level of consciousness: awake and alert and oriented  Post-procedure vital signs: reviewed and stable  Pain management: adequate  Airway patency: patent    PONV status at discharge: No PONV  Anesthetic complications: no      Cardiovascular status: hemodynamically stable  Respiratory status: unassisted, spontaneous ventilation and room air  Hydration status: euvolemic  Follow-up not needed.              Vitals Value Taken Time   /62 08/09/24 1133   Temp 36.4 °C (97.6 °F) 08/09/24 1133   Pulse 65 08/09/24 1136   Resp 18 08/09/24 0413   SpO2 97 % 08/09/24 1133   Vitals shown include unfiled device data.      Event Time   Out of Recovery 08/08/2024 10:45:00         Pain/Sanjeev Score: No data recorded

## 2024-08-13 NOTE — PROGRESS NOTES
8/13/2024  1st attempt to complete Post discharge assessment for Outpatient Care Management, left message.

## 2024-08-13 NOTE — PHYSICIAN QUERY
Please further specify the heart failure diagnosis.    Chronic Diastolic Heart Failure (HFpEF)

## 2024-08-14 ENCOUNTER — OUTPATIENT CASE MANAGEMENT (OUTPATIENT)
Dept: ADMINISTRATIVE | Facility: OTHER | Age: 84
End: 2024-08-14
Payer: MEDICARE

## 2024-08-14 ENCOUNTER — LAB VISIT (OUTPATIENT)
Dept: LAB | Facility: HOSPITAL | Age: 84
End: 2024-08-14
Attending: INTERNAL MEDICINE
Payer: MEDICARE

## 2024-08-14 ENCOUNTER — ANTI-COAG VISIT (OUTPATIENT)
Dept: CARDIOLOGY | Facility: CLINIC | Age: 84
End: 2024-08-14
Payer: MEDICARE

## 2024-08-14 DIAGNOSIS — Z79.01 CHRONIC ANTICOAGULATION: Primary | ICD-10-CM

## 2024-08-14 DIAGNOSIS — Z95.2 H/O MECHANICAL AORTIC VALVE REPLACEMENT: ICD-10-CM

## 2024-08-14 DIAGNOSIS — Z79.01 LONG TERM (CURRENT) USE OF ANTICOAGULANTS: ICD-10-CM

## 2024-08-14 LAB
INR PPP: 1.3 (ref 0.8–1.2)
PROTHROMBIN TIME: 14.3 SEC (ref 9–12.5)

## 2024-08-14 PROCEDURE — 85610 PROTHROMBIN TIME: CPT | Mod: HCNC | Performed by: INTERNAL MEDICINE

## 2024-08-14 PROCEDURE — 36415 COLL VENOUS BLD VENIPUNCTURE: CPT | Mod: HCNC | Performed by: INTERNAL MEDICINE

## 2024-08-14 PROCEDURE — 93793 ANTICOAG MGMT PT WARFARIN: CPT | Mod: S$GLB,,,

## 2024-08-14 NOTE — PROGRESS NOTES
[facility]tico[facility] Management [facility]    2024 1:13 PM    Assessment/Plan:    Patient presents today with subtherapeutic  INR.    Assessment of patient findings and chart review: Pt denies any changes.    Recommendation for patient's warfarin regimen: Boost dose today to 7.5mg then resume current maintenance dose    Recommend repeat INR in 1 week  _________________________________________________________________    Dariel Urbina (84 y.o.) is followed by the [facility].    Anticoagulation Summary  As of 2024      INR goal:  2.0-3.0   TTR:  67.8% (11.9 y)   INR used for dosin.3 (2024)   Warfarin maintenance plan:  2.5 mg (5 mg x 0.5) every Mon, Fri; 5 mg (5 mg x 1) all other days   Weekly warfarin total:  30 mg   Plan last modified:  Laurie Patino, PharmD (2024)   Next INR check:     Target end date:      Indications    Chronic anticoagulation [Z79.01]  H/O mechanical aortic valve replacement [Z95.2]                 Anticoagulation Episode Summary       INR check location:  Clinic Lab    Preferred lab:      Send INR reminders to:  [facility] COUMADIN MONITORING POOL    Comments:  [facility] - Internal Med Clinic only Mon - u // carpel tunnel release  - target low end of range          Anticoagulation Care Providers       Provider Role Specialty Phone number    Devon Garnica MD [facility] [phone]

## 2024-08-14 NOTE — PROGRESS NOTES
Subjective:      Dariel is a 84 y.o. male who comes for follow-up of  epistaxis .  He follows up after recently being admitted and discharged from the hospital after acute epistaxis.  He eventually underwent repeat angiography with subsequent bilateral embolizations again.  He was then taken to the OR for nasal endoscopy and removal of his nasal packs.  Since discharge he was not had any significant episodes of bleeding.  States he was able to sleep well at night in his gaining weight.    He previously undergone bilateral SP ligation followed by bilateral embolization, cautery on the left septum for persistent bleeding on 11/03/2023 and again recently on 12/18/23. He recently underwent left AEA ligation on 2/28/24 and recently underwent nasal endoscopy and control of epistaxis on 6/18/24.  He then had repeat embolization on 08/06/24.     His current sinus regime consists of: Saline.     The assessment of quality and severity of symptoms as measured by the SNOT-22 score is deferred.    The patient's medications, allergies, past medical, surgical, social and family histories were reviewed and updated as appropriate.    ROS     A detailed review of systems was obtained with pertinent positives as per the above HPI, and otherwise negative.        Objective:     BP (!) 148/61 (BP Location: Left arm, Patient Position: Sitting, BP Method: Medium (Automatic))   Pulse (!) 52        Constitutional:   Vital signs are normal. He appears well-developed and well-nourished.     Head:  Normocephalic and atraumatic.     Ears:  Hearing normal to normal and whispered voice; external ear normal without scars, lesions, or masses; ear canal, tympanic membrane, and middle ear normal..   Right Ear: No swelling. Tympanic membrane is not perforated and not bulging. No middle ear effusion.   Left Ear: No swelling. Tympanic membrane is not perforated and not bulging.  No middle ear effusion.     Nose:  Nose normal including turbinates,  "nasal mucosa, sinuses and nasal septum. No epistaxis.         Mouth/Throat  Oropharynx clear and moist without lesions or asymmetry and normal uvula midline. Normal dentition. No tonsillar abscesses. Tonsillar exudate.      Neck:  Neck normal without thyromegaly masses, asymmetry, normal tracheal structure, crepitus, and tenderness, thyroid normal, trachea normal, phonation normal, full range of motion with neck supple and no adenopathy. No stridor present.        Head (right side): No submental adenopathy present.        Head (left side): No submental adenopathy present.     He has no cervical adenopathy.     Pulmonary/Chest:   No stridor.     Procedure    None    Data Reviewed    WBC (K/uL)   Date Value   08/09/2024 7.40     Eosinophil % (%)   Date Value   08/09/2024 0.0     Eos # (K/uL)   Date Value   08/09/2024 0.0     Platelets (K/uL)   Date Value   08/09/2024 152     Glucose (mg/dL)   Date Value   08/09/2024 132 (H)     No results found for: "IGE"    No sinus imaging available.    Assessment:     1. Epistaxis    2. Recurrent epistaxis    3. Nasal septal perforation         Plan:     - No bleeding currently, crusting in nose from pack removal.  - Nasal saline   - RTC 1 month    Jono Russell MD     "

## 2024-08-14 NOTE — PROGRESS NOTES
8/14/2024  1st attempt to complete Follow-Up  for Outpatient Care Management, left message.  Will send portal message with unable to assess letter.     8/14/2024  2nd attempt to complete Post Discharge Assessment for Outpatient Care Management, left message.

## 2024-08-15 ENCOUNTER — OUTPATIENT CASE MANAGEMENT (OUTPATIENT)
Dept: ADMINISTRATIVE | Facility: OTHER | Age: 84
End: 2024-08-15
Payer: MEDICARE

## 2024-08-15 NOTE — PROGRESS NOTES
Outpatient Care Management  Plan of Care Follow Up Visit    Patient: Dariel Urbina  MRN: [id number]  Date of Service: 08/15/2024  Completed by: Tamiko Lyons RN  Referral Date: 05/30/2024    Reason for Visit   Patient presents with    OPCM RN Follow Up Call    OPCM Post Discharge              Brief Summary: OPCM RN followed up with Mrs. Lindquist and Mr. Vieira today for care plan review and post discharge assessment completion.    Next Steps:   Explore if Mr. Vieira has been free of epistaxis episodes.  Review how Mr. Vieira is feeling. (More energy?)  Free from falls?  Encourage physical activity, such as performance of self-care at highest level of ability, strength and balance exercise program, and provision of appropriate assistive devices; refer to rehabilitation therapy.   Mrs. Lindquist agreed to OPCM RN follow up on or around 8/29/24.

## 2024-08-16 ENCOUNTER — OFFICE VISIT (OUTPATIENT)
Dept: OTOLARYNGOLOGY | Facility: CLINIC | Age: 84
End: 2024-08-16
Payer: MEDICARE

## 2024-08-16 VITALS — SYSTOLIC BLOOD PRESSURE: 148 MMHG | HEART RATE: 52 BPM | DIASTOLIC BLOOD PRESSURE: 61 MMHG

## 2024-08-16 DIAGNOSIS — R04.0 EPISTAXIS: Primary | ICD-10-CM

## 2024-08-16 DIAGNOSIS — R04.0 RECURRENT EPISTAXIS: ICD-10-CM

## 2024-08-16 DIAGNOSIS — J34.89 NASAL SEPTAL PERFORATION: ICD-10-CM

## 2024-08-16 PROCEDURE — 99999 PR PBB SHADOW E&M-EST. PATIENT-LVL III: CPT | Mod: PBBFAC,HCNC,, | Performed by: STUDENT IN AN ORGANIZED HEALTH CARE EDUCATION/TRAINING PROGRAM

## 2024-08-21 ENCOUNTER — LAB VISIT (OUTPATIENT)
Dept: LAB | Facility: HOSPITAL | Age: 84
End: 2024-08-21
Attending: INTERNAL MEDICINE
Payer: MEDICARE

## 2024-08-21 ENCOUNTER — ANTI-COAG VISIT (OUTPATIENT)
Dept: CARDIOLOGY | Facility: CLINIC | Age: 84
End: 2024-08-21
Payer: MEDICARE

## 2024-08-21 DIAGNOSIS — Z95.2 H/O MECHANICAL AORTIC VALVE REPLACEMENT: ICD-10-CM

## 2024-08-21 DIAGNOSIS — Z79.01 LONG TERM (CURRENT) USE OF ANTICOAGULANTS: ICD-10-CM

## 2024-08-21 DIAGNOSIS — Z79.01 CHRONIC ANTICOAGULATION: Primary | ICD-10-CM

## 2024-08-21 LAB
INR PPP: 1.7 (ref 0.8–1.2)
PROTHROMBIN TIME: 17.6 SEC (ref 9–12.5)

## 2024-08-21 PROCEDURE — 93793 ANTICOAG MGMT PT WARFARIN: CPT | Mod: S$GLB,,,

## 2024-08-21 PROCEDURE — 85610 PROTHROMBIN TIME: CPT | Mod: HCNC | Performed by: INTERNAL MEDICINE

## 2024-08-21 PROCEDURE — 36415 COLL VENOUS BLD VENIPUNCTURE: CPT | Mod: HCNC | Performed by: INTERNAL MEDICINE

## 2024-08-21 NOTE — PROGRESS NOTES
[facility]    2024 11:44 AM    Assessment/Plan:    Patient presents today with subtherapeutic  INR.    Assessment of patient findings and chart review: Pt remains subtherapeutic after re-challenging.      Recommendation for patient's warfarin regimen: Boost dose today to 7.5mg then increase maintenance dose    Recommend repeat INR in 1 week  _________________________________________________________________    Dariel Urbina (84 y.o.) is followed by the [facility].    Anticoagulation Summary  As of 2024      INR goal:  2.0-3.0   TTR:  67.7% (11.9 y)   INR used for dosin.7 (2024)   Warfarin maintenance plan:  2.5 mg (5 mg x 0.5) every Mon; 5 mg (5 mg x 1) all other days   Weekly warfarin total:  32.5 mg   Plan last modified:  Laurie Patino, PharmD (2024)   Next INR check:  2024   Target end date:      Indications    Chronic anticoagulation [Z79.01]  H/O mechanical aortic valve replacement [Z95.2]                 Anticoagulation Episode Summary       INR check location:  Clinic Lab    Preferred lab:      Send INR reminders to:  [facility] COUMADIN MONITORING POOL    Comments:  [facility] - Internal Med Clinic only Mon - Thu // carpel tunnel release  - target low end of range          Anticoagulation Care Providers       Provider Role Specialty Phone number    Devon Garnica MD [facility] Card[facility] [phone]

## 2024-08-27 ENCOUNTER — OUTPATIENT CASE MANAGEMENT (OUTPATIENT)
Dept: ADMINISTRATIVE | Facility: OTHER | Age: 84
End: 2024-08-27
Payer: MEDICARE

## 2024-08-27 NOTE — PROGRESS NOTES
Outpatient Care Management   - Patient Assessment    Patient: Dariel Urbina  MRN:  [id number]  Date of Service:  8/27/2024  Completed by:  Sherin Petit LCSW  Referral Date: 05/30/2024    Reason for Visit   Patient presents with    OPCM Post Discharge     08/27/2024       Brief Summary:  received a referral from OPCM RN for the following HR SW psychosocial needs: Post DC Assessment. Spoke with patient who reports he is currently peeling shrimp and prefers for SW to speak with his wife, Ameena. Ameena reports patient has an extensive history of nosebleeds and has had multiple procedures and medication adjustments to attempt to correct the issue, but nothing has worked. Ameena reports patient has nosebleeds most days, but is normally able to stop the flow on his own. Ameena reports they have been advised by his doctor to go to the ER whenever patient cannot stop the bleeding. Patient does take coumadin and Ameena reports they have lowered the dose in an attempt to help with the nosebleeds. Ameena reports patient has strong family support. Ameena states patient does not want to leave the home very often anymore since his issue with nosebleeds and needing new teeth; patient has to slowly undergo dentist appts for tooth pulling due to the bleeding risk. Patient is independent with ADLs and IADLs. Ameena denies any urgent financial strain, food insecurity, or transportation issues. Ameena declined [employer] post DC meals, states patient would not like them and prefers to eat home cooked meals or cereal. Care plan was created in collaboration with patient/caregiver input.

## 2024-08-28 ENCOUNTER — ANTI-COAG VISIT (OUTPATIENT)
Dept: CARDIOLOGY | Facility: CLINIC | Age: 84
End: 2024-08-28
Payer: MEDICARE

## 2024-08-28 ENCOUNTER — LAB VISIT (OUTPATIENT)
Dept: LAB | Facility: HOSPITAL | Age: 84
End: 2024-08-28
Attending: INTERNAL MEDICINE
Payer: MEDICARE

## 2024-08-28 DIAGNOSIS — Z95.2 H/O MECHANICAL AORTIC VALVE REPLACEMENT: ICD-10-CM

## 2024-08-28 DIAGNOSIS — Z79.01 CHRONIC ANTICOAGULATION: Primary | ICD-10-CM

## 2024-08-28 DIAGNOSIS — Z79.01 LONG TERM (CURRENT) USE OF ANTICOAGULANTS: ICD-10-CM

## 2024-08-28 LAB
INR PPP: 2.4 (ref 0.8–1.2)
PROTHROMBIN TIME: 24.6 SEC (ref 9–12.5)

## 2024-08-28 PROCEDURE — 36415 COLL VENOUS BLD VENIPUNCTURE: CPT | Mod: HCNC | Performed by: INTERNAL MEDICINE

## 2024-08-28 PROCEDURE — 93793 ANTICOAG MGMT PT WARFARIN: CPT | Mod: S$GLB,,,

## 2024-08-28 PROCEDURE — 85610 PROTHROMBIN TIME: CPT | Mod: HCNC | Performed by: INTERNAL MEDICINE

## 2024-08-28 NOTE — PROGRESS NOTES
[facility]    2024 10:19 AM    Assessment/Plan:    Patient presents today with therapeutic INR.    Assessment of patient findings and chart review: Reviewed     Recommendation for patient's warfarin regimen: Continue current maintenance dose    Recommend repeat INR in 2 weeks  _________________________________________________________________    Dariel Urbina (84 y.o.) is followed by the [facility].    Anticoagulation Summary  As of 2024      INR goal:  2.0-3.0   TTR:  67.7% (12 y)   INR used for dosin.4 (2024)   Warfarin maintenance plan:  2.5 mg (5 mg x 0.5) every Mon; 5 mg (5 mg x 1) all other days   Weekly warfarin total:  32.5 mg   Plan last modified:  Laurie Patino, PharmD (2024)   Next INR check:  2024   Target end date:      Indications    Chronic anticoagulation [Z79.01]  H/O mechanical aortic valve replacement [Z95.2]                 Anticoagulation Episode Summary       INR check location:  Clinic Lab    Preferred lab:      Send INR reminders to:  [facility] COUMADIN MONITORING POOL    Comments:  [facility] - Internal Med Clinic only Mon - Thu // carpel tunnel release  - target low end of range          Anticoagulation Care Providers       Provider Role Specialty Phone number    Devon Garnica MD [facility] Card[facility] [phone]

## 2024-08-29 ENCOUNTER — OUTPATIENT CASE MANAGEMENT (OUTPATIENT)
Dept: ADMINISTRATIVE | Facility: OTHER | Age: 84
End: 2024-08-29
Payer: MEDICARE

## 2024-08-29 NOTE — PROGRESS NOTES
8/29/2024  1st attempt to complete Follow-Up  for Outpatient Care Management, Mrs. Lindquist reports that she is unable to talk at this time, requested a call back at a later date.

## 2024-08-30 ENCOUNTER — OUTPATIENT CASE MANAGEMENT (OUTPATIENT)
Dept: ADMINISTRATIVE | Facility: OTHER | Age: 84
End: 2024-08-30
Payer: MEDICARE

## 2024-08-30 NOTE — PROGRESS NOTES
Outpatient Care Management  Plan of Care Follow Up Visit    Patient: Dariel Urbina  MRN: [id number]  Date of Service: 08/30/2024  Completed by: Tamiko Lyons RN  Referral Date: 05/30/2024    Reason for Visit   Patient presents with    OPCM RN Follow Up Call    Case Closure     8/30/24  Mrs. Lindquist agreed to graduation from [facility], ensured she has contact information for any future questions/concerns. Case closure.        Brief Summary: OPCM RN followed up with Mrs. Lindquist today for care plan review.    Next Steps:   8/30/24  Mrs. Lindquist agreed to graduation from [facility], ensured she has contact information for any future questions/concerns. Case closure.

## 2024-09-02 PROBLEM — I21.4 NSTEMI (NON-ST ELEVATED MYOCARDIAL INFARCTION): Status: RESOLVED | Noted: 2018-03-21 | Resolved: 2024-09-02

## 2024-09-02 PROBLEM — N18.9 ACUTE KIDNEY INJURY SUPERIMPOSED ON CHRONIC KIDNEY DISEASE: Status: RESOLVED | Noted: 2022-02-10 | Resolved: 2024-09-02

## 2024-09-02 PROBLEM — N17.9 ACUTE KIDNEY INJURY SUPERIMPOSED ON CHRONIC KIDNEY DISEASE: Status: RESOLVED | Noted: 2022-02-10 | Resolved: 2024-09-02

## 2024-09-05 ENCOUNTER — ANTI-COAG VISIT (OUTPATIENT)
Dept: CARDIOLOGY | Facility: CLINIC | Age: 84
End: 2024-09-05
Payer: MEDICARE

## 2024-09-05 ENCOUNTER — LAB VISIT (OUTPATIENT)
Dept: LAB | Facility: HOSPITAL | Age: 84
End: 2024-09-05
Attending: INTERNAL MEDICINE
Payer: MEDICARE

## 2024-09-05 DIAGNOSIS — Z79.01 CHRONIC ANTICOAGULATION: Primary | ICD-10-CM

## 2024-09-05 DIAGNOSIS — Z79.01 LONG TERM (CURRENT) USE OF ANTICOAGULANTS: ICD-10-CM

## 2024-09-05 DIAGNOSIS — Z95.2 H/O MECHANICAL AORTIC VALVE REPLACEMENT: ICD-10-CM

## 2024-09-05 LAB
INR PPP: 1.5 (ref 0.8–1.2)
PROTHROMBIN TIME: 15.6 SEC (ref 9–12.5)

## 2024-09-05 PROCEDURE — 36415 COLL VENOUS BLD VENIPUNCTURE: CPT | Mod: HCNC | Performed by: INTERNAL MEDICINE

## 2024-09-05 PROCEDURE — 85610 PROTHROMBIN TIME: CPT | Mod: HCNC | Performed by: INTERNAL MEDICINE

## 2024-09-05 PROCEDURE — 93793 ANTICOAG MGMT PT WARFARIN: CPT | Mod: S$GLB,,,

## 2024-09-05 NOTE — PROGRESS NOTES
[facility]    2024 11:28 AM    Assessment/Plan:    Patient presents today with subtherapeutic  INR.    Assessment of patient findings and chart review: Pt is subtherapeutic cause unknown.  Could be due to increase in appetite.  Of note, pt has decreased vit k intake.  See findings (ensure intake decrease from 3x daily to 4x/week). Pt's last therapeutic INR was on ~32.5mg/week.     Recommendation for patient's warfarin regimen: Increase maintenance dose    Recommend repeat INR in 1 week  _________________________________________________________________    Dariel Urbina (84 y.o.) is followed by the [facility].    Anticoagulation Summary  As of 2024      INR goal:  2.0-3.0   TTR:  67.6% (12 y)   INR used for dosin.5 (2024)   Warfarin maintenance plan:  2.5 mg (5 mg x 0.5) every Fri; 5 mg (5 mg x 1) all other days   Weekly warfarin total:  32.5 mg   Plan last modified:  Laurie Patino, PharmD (2024)   Next INR check:     Target end date:      Indications    Chronic anticoagulation [Z79.01]  H/O mechanical aortic valve replacement [Z95.2]                 Anticoagulation Episode Summary       INR check location:  Clinic Lab    Preferred lab:      Send INR reminders to:  [facility] COUMADIN MONITORING POOL    Comments:  [facility] - Internal Med Clinic only Mon - Thu // carpel tunnel release  - target low end of range          Anticoagulation Care Providers       Provider Role Specialty Phone number    Devon Garnica MD [facility] Cardiology [phone]

## 2024-09-13 ENCOUNTER — LAB VISIT (OUTPATIENT)
Dept: LAB | Facility: HOSPITAL | Age: 84
End: 2024-09-13
Attending: INTERNAL MEDICINE
Payer: MEDICARE

## 2024-09-13 ENCOUNTER — ANTI-COAG VISIT (OUTPATIENT)
Dept: CARDIOLOGY | Facility: CLINIC | Age: 84
End: 2024-09-13
Payer: MEDICARE

## 2024-09-13 DIAGNOSIS — Z95.2 H/O MECHANICAL AORTIC VALVE REPLACEMENT: ICD-10-CM

## 2024-09-13 DIAGNOSIS — Z79.01 CHRONIC ANTICOAGULATION: Primary | ICD-10-CM

## 2024-09-13 DIAGNOSIS — Z79.01 LONG TERM (CURRENT) USE OF ANTICOAGULANTS: ICD-10-CM

## 2024-09-13 LAB
INR PPP: 2.4 (ref 0.8–1.2)
PROTHROMBIN TIME: 24.6 SEC (ref 9–12.5)

## 2024-09-13 PROCEDURE — 36415 COLL VENOUS BLD VENIPUNCTURE: CPT | Mod: HCNC | Performed by: INTERNAL MEDICINE

## 2024-09-13 PROCEDURE — 85610 PROTHROMBIN TIME: CPT | Mod: HCNC | Performed by: INTERNAL MEDICINE

## 2024-09-13 NOTE — PROGRESS NOTES
[facility]    2024 7:46 AM    Assessment/Plan:    Patient presents today with therapeutic INR.    Recommendation for patient's warfarin regimen: Continue current maintenance dose    Recommend repeat INR in 1 week  _________________________________________________________________    Dariel Devon Urbina (84 y.o.) is followed by the [facility].    Anticoagulation Summary  As of 2024      INR goal:  2.0-3.0   TTR:  67.6% (12 y)   INR used for dosin.4 (2024)   Warfarin maintenance plan:  2.5 mg (5 mg x 0.5) every Fri; 5 mg (5 mg x 1) all other days   Weekly warfarin total:  32.5 mg   No change documented:  Jelly Nava, PharmD   Plan last modified:  Laurie Patino, PharmD (2024)   Next INR check:  2024   Target end date:      Indications    Chronic anticoagulation [Z79.01]  H/O mechanical aortic valve replacement [Z95.2]                 Anticoagulation Episode Summary       INR check location:  Clinic Lab    Preferred lab:      Send INR reminders to:  [facility] COUMADIN MONITORING POOL    Comments:  [facility] - Internal Med Clinic only Mon - Thu // carpel tunnel release  - target low end of range          Anticoagulation Care Providers       Provider Role Specialty Phone number    Devon Garnica MD [facility] Cardiology [phone]

## 2024-09-19 ENCOUNTER — LAB VISIT (OUTPATIENT)
Dept: LAB | Facility: HOSPITAL | Age: 84
End: 2024-09-19
Attending: INTERNAL MEDICINE
Payer: MEDICARE

## 2024-09-19 ENCOUNTER — ANTI-COAG VISIT (OUTPATIENT)
Dept: CARDIOLOGY | Facility: CLINIC | Age: 84
End: 2024-09-19
Payer: MEDICARE

## 2024-09-19 DIAGNOSIS — Z79.01 LONG TERM (CURRENT) USE OF ANTICOAGULANTS: ICD-10-CM

## 2024-09-19 DIAGNOSIS — Z95.2 H/O MECHANICAL AORTIC VALVE REPLACEMENT: ICD-10-CM

## 2024-09-19 DIAGNOSIS — Z79.01 CHRONIC ANTICOAGULATION: Primary | ICD-10-CM

## 2024-09-19 LAB
INR PPP: 3.7 (ref 0.8–1.2)
PROTHROMBIN TIME: 37 SEC (ref 9–12.5)

## 2024-09-19 PROCEDURE — 93793 ANTICOAG MGMT PT WARFARIN: CPT | Mod: S$GLB,,,

## 2024-09-19 PROCEDURE — 36415 COLL VENOUS BLD VENIPUNCTURE: CPT | Mod: HCNC | Performed by: INTERNAL MEDICINE

## 2024-09-19 PROCEDURE — 85610 PROTHROMBIN TIME: CPT | Mod: HCNC | Performed by: INTERNAL MEDICINE

## 2024-09-19 NOTE — PROGRESS NOTES
[facility]    09/19/2024 11:13 AM    Assessment/Plan:    Patient presents today with supratherapeutic INR.    Assessment of patient findings and chart review: INR supratherapeutic likely due to due to decrease in vit k/protein. Pt to resume Ensure.  Increase in INR is likely source of increased nose bleeds as well.     Recommendation for patient's warfarin regimen: Hold dose today then resume current maintenance dose & resume normal diet.     Recommend repeat INR in 1 week  _________________________________________________________________    Dariel Urbina (84 y.o.) is followed by the [email or web address].    Anticoagulation Summary  As of 9/19/2024      INR goal:  2.0-3.0   TTR:  67.5% (12 y)   INR used for dosing:  3.7 (9/19/2024)   Warfarin maintenance plan:  2.5 mg (5 mg x 0.5) every Fri; 5 mg (5 mg x 1) all other days   Weekly warfarin total:  32.5 mg   Plan last modified:  Laurie Patino, PharmD (9/5/2024)   Next INR check:     Target end date:      Indications    Chronic anticoagulation [Z79.01]  H/O mechanical aortic valve replacement [Z95.2]                 Anticoagulation Episode Summary       INR check location:  Clinic Lab    Preferred lab:      Send INR reminders to:  [facility] COUMADIN MONITORING POOL    Comments:  [facility] - Internal Med Clinic only Mon - Thu // carpel tunnel release 5/19 - target low end of range          Anticoagulation Care Providers       Provider Role Specialty Phone number    Devon Garnica MD [facility] Cardiology [phone]

## 2024-09-22 ENCOUNTER — HOSPITAL ENCOUNTER (EMERGENCY)
Facility: HOSPITAL | Age: 84
Discharge: HOME | End: 2024-09-22
Attending: STUDENT IN AN ORGANIZED HEALTH CARE EDUCATION/TRAINING PROGRAM
Payer: MEDICARE

## 2024-09-22 VITALS
HEIGHT: 65 IN | OXYGEN SATURATION: 99 % | RESPIRATION RATE: 18 BRPM | BODY MASS INDEX: 25.45 KG/M2 | DIASTOLIC BLOOD PRESSURE: 61 MMHG | SYSTOLIC BLOOD PRESSURE: 136 MMHG | TEMPERATURE: 98 F | WEIGHT: 152.75 LBS | HEART RATE: 62 BPM

## 2024-09-22 DIAGNOSIS — R04.0 EPISTAXIS: Primary | ICD-10-CM

## 2024-09-22 DIAGNOSIS — D64.9 ANEMIA, UNSPECIFIED TYPE: ICD-10-CM

## 2024-09-22 LAB
ABO + RH BLD: NORMAL
ALBUMIN SERPL BCP-MCNC: 3 G/DL (ref 3.5–5.2)
ALP SERPL-CCNC: 67 U/L (ref 55–135)
ALT SERPL W/O P-5'-P-CCNC: 17 U/L (ref 10–44)
ANION GAP SERPL CALC-SCNC: 9 MMOL/L (ref 8–16)
AST SERPL-CCNC: 21 U/L (ref 10–40)
BASOPHILS # BLD AUTO: 0.04 K/UL (ref 0–0.2)
BASOPHILS NFR BLD: 0.8 % (ref 0–1.9)
BILIRUB SERPL-MCNC: 0.4 MG/DL (ref 0.1–1)
BLD GP AB SCN CELLS X3 SERPL QL: NORMAL
BUN SERPL-MCNC: 68 MG/DL (ref 8–23)
CALCIUM SERPL-MCNC: 10.2 MG/DL (ref 8.7–10.5)
CHLORIDE SERPL-SCNC: 111 MMOL/L (ref 95–110)
CO2 SERPL-SCNC: 18 MMOL/L (ref 23–29)
CREAT SERPL-MCNC: 2.2 MG/DL (ref 0.5–1.4)
DIFFERENTIAL METHOD BLD: ABNORMAL
EOSINOPHIL # BLD AUTO: 0.1 K/UL (ref 0–0.5)
EOSINOPHIL NFR BLD: 1.5 % (ref 0–8)
ERYTHROCYTE [DISTWIDTH] IN BLOOD BY AUTOMATED COUNT: 14.7 % (ref 11.5–14.5)
EST. GFR  (NO RACE VARIABLE): 29 ML/MIN/1.73 M^2
GLUCOSE SERPL-MCNC: 95 MG/DL (ref 70–110)
HCT VFR BLD AUTO: 24.5 % (ref 40–54)
HGB BLD-MCNC: 7.4 G/DL (ref 14–18)
IMM GRANULOCYTES # BLD AUTO: 0.01 K/UL (ref 0–0.04)
IMM GRANULOCYTES NFR BLD AUTO: 0.2 % (ref 0–0.5)
INR PPP: 2.4 (ref 0.8–1.2)
LYMPHOCYTES # BLD AUTO: 1.3 K/UL (ref 1–4.8)
LYMPHOCYTES NFR BLD: 26.6 % (ref 18–48)
MCH RBC QN AUTO: 30 PG (ref 27–31)
MCHC RBC AUTO-ENTMCNC: 30.2 G/DL (ref 32–36)
MCV RBC AUTO: 99 FL (ref 82–98)
MONOCYTES # BLD AUTO: 0.5 K/UL (ref 0.3–1)
MONOCYTES NFR BLD: 11.2 % (ref 4–15)
NEUTROPHILS # BLD AUTO: 2.8 K/UL (ref 1.8–7.7)
NEUTROPHILS NFR BLD: 59.7 % (ref 38–73)
NRBC BLD-RTO: 0 /100 WBC
PLATELET # BLD AUTO: 188 K/UL (ref 150–450)
PMV BLD AUTO: 11 FL (ref 9.2–12.9)
POTASSIUM SERPL-SCNC: 4.7 MMOL/L (ref 3.5–5.1)
PROT SERPL-MCNC: 6.7 G/DL (ref 6–8.4)
PROTHROMBIN TIME: 24.8 SEC (ref 9–12.5)
RBC # BLD AUTO: 2.47 M/UL (ref 4.6–6.2)
SODIUM SERPL-SCNC: 138 MMOL/L (ref 136–145)
SPECIMEN OUTDATE: NORMAL
WBC # BLD AUTO: 4.73 K/UL (ref 3.9–12.7)

## 2024-09-22 PROCEDURE — 80053 COMPREHEN METABOLIC PANEL: CPT | Mod: HCNC

## 2024-09-22 PROCEDURE — 85025 COMPLETE CBC W/AUTO DIFF WBC: CPT | Mod: HCNC

## 2024-09-22 PROCEDURE — 86901 BLOOD TYPING SEROLOGIC RH(D): CPT | Mod: HCNC

## 2024-09-22 PROCEDURE — 85610 PROTHROMBIN TIME: CPT | Mod: HCNC

## 2024-09-22 PROCEDURE — 86900 BLOOD TYPING SEROLOGIC ABO: CPT | Mod: HCNC

## 2024-09-22 PROCEDURE — 99283 EMERGENCY DEPT VISIT LOW MDM: CPT | Mod: 25,HCNC

## 2024-09-22 PROCEDURE — 86850 RBC ANTIBODY SCREEN: CPT | Mod: HCNC

## 2024-09-22 RX ORDER — OXYMETAZOLINE HCL 0.05 %
1 SPRAY, NON-AEROSOL (ML) NASAL
Status: DISCONTINUED | OUTPATIENT
Start: 2024-09-22 | End: 2024-09-22 | Stop reason: HOSPADM

## 2024-09-22 NOTE — DISCHARGE INSTRUCTIONS
Thank you for coming to our Emergency Department today! It is important to remember that some problems or medical conditions are difficult to diagnose and may not be found or addressed during your Emergency Department visit.     Be sure to follow up with your primary care doctor and review all labs/imaging/tests that were performed during your ER visit with them. Some labs/imaging/tests may be outside of the normal range, and require non-emergent follow-up and/or further investigation/treatment/procedures/testing to help diagnose/exclude/prevent complications or other potentially serious medical conditions that were not discussed or addressed during your ER visit.    Please take all medications as directed. All medications may potentially have side-effects and it is impossible to predict which medications may give you side-effects or what side-effects (if any) they will give you.. If you feel that you are having a negative effect or side-effect of any medication you should immediately stop taking them and seek medical attention. If you feel that you are having a life-threatening reaction call 911.    Return to the ER with any questions/concerns, new/concerning symptoms, worsening or failure to improve.     Do not drive, swim, climb to height, take a bath, operate heavy machinery, drink alcohol or take potentially sedating medications, sign any legal documents or make any important decisions for 24 hours if you have received any pain medications, sedatives or mood altering drugs during your ER visit or within 24 hours of taking them if they have been prescribed to you.     If you do not have a primary care doctor, you may contact the one listed on your discharge paperwork or you may also call the [facility] Appointment Desk at [phone] to schedule an appointment and establish care with one. It is important to your health that you have a primary care doctor.

## 2024-09-22 NOTE — ED PROVIDER NOTES
"Encounter Date: 9/22/2024       History     Chief Complaint   Patient presents with    Epistaxis     Nosebleed started around 9am, states having clots coming out. +blood thinners, bleeding controlled in triage     84-year-old male with past medical history of CHF, CKD, mechanical heart valve on Coumadin, anemia, NSTEMI, PVD and type 2 diabetes and recurrent epistaxis with recent right maxillary, 2 left maxillary collaterals, and a dominant left facial arteries on 8/7 (additionally requiring 3 units of blood) now presents with a chief complaint of epistaxis.  Reports that all day he has experienced ongoing epistaxis soaking multiple tissues and passing multiple clots.  Patient additionally reports feeling weak and patient's wife endorses that the patient "appears pale".  Patient tells me that he is worried his hemoglobin is low again.  Patient denies any digital trauma to his nose, recent viral infections, or bleeding from any other sites.  On arrival in the emergency department patient's bleeding has stopped.    The history is provided by the patient and a relative.     Review of patient's allergies indicates:   Allergen Reactions    Lisinopril     Losartan      Intolerance- elevates potassium level     Past Medical History:   Diagnosis Date    Anemia of chronic renal failure, stage 3 (moderate) 05/27/2015    Anticoagulant long-term use     Atherosclerosis of coronary artery bypass graft of native heart without angina pectoris 09/11/2012    3-27-18 [facility] Two vessel coronary artery disease.   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    Bilateral carotid artery disease 02/09/2017    Bleeding from the nose     Bleeding nose 03/21/2018    Cancer     Cataract     CHF (congestive heart failure)     CKD (chronic kidney disease) stage 3, GFR 30-59 ml/min 05/27/2015    Claudication of left lower extremity 09/17/2014    Colon polyp     Encounter for blood transfusion     Gastroesophageal reflux disease " without esophagitis 03/19/2018    Gastrointestinal hemorrhage associated with intestinal diverticulosis 04/01/2018    Glaucoma     H/O mechanical aortic valve replacement 09/17/2014    History of gout 09/26/2012    Hyperparathyroidism due to renal insufficiency 07/27/2015    Internal hemorrhoid 04/03/2018    Long term current use of anticoagulant therapy 09/26/2012    Mechanical heart valve present     Metabolic acidosis with normal anion gap and bicarbonate losses 03/20/2018    Mixed hyperlipidemia 09/26/2012    NSTEMI (non-ST elevated myocardial infarction) 03/21/2018    Obesity, diabetes, and hypertension syndrome 02/23/2016    Paroxysmal atrial fibrillation 02/06/2023    PVD (peripheral vascular disease) 09/11/2012    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     Past Surgical History:   Procedure Laterality Date    BACK SURGERY      CARDIAC CATHETERIZATION      CARDIAC VALVE REPLACEMENT      CARDIAC VALVE SURGERY      CARPAL TUNNEL RELEASE Right 05/19/2020    Procedure: RELEASE, CARPAL TUNNEL;  Surgeon: Rupesh Norris Jr., MD;  Location: [facility];  Service: Plastics;  Laterality: Right;    CATARACT EXTRACTION Left 11/13/2022        COLON SURGERY      COLONOSCOPY N/A 03/31/2017    Procedure: COLONOSCOPY;  Surgeon: Bruno Raymond MD;  Location: [facility] (4TH FLR);  Service: Endoscopy;  Laterality: N/A;  Patient's wife requesting date.    COLONOSCOPY N/A 04/03/2018    Procedure: COLONOSCOPY;  Surgeon: Bonifacio Pelletier MD;  Location: [facility] (2ND FLR);  Service: Endoscopy;  Laterality: N/A;    COLONOSCOPY N/A 08/13/2018    Procedure: COLONOSCOPY;  Surgeon: Kam Barba MD;   Location: [facility] ENDO (2ND FLR);  Service: Endoscopy;  Laterality: N/A;  2nd floor: PA pressure 49; hx of moderate-severe valve disease     per Coumadin clinic-Patient can hold 5 days with lovenox bridge       ok to schedule per Katarina    CONTROL OF EPISTAXIS, POSTERIOR, USING NASAL PACKING OR CAUTERIZATION Bilateral 6/18/2024    Procedure: CONTROL OF EPISTAXIS, POSTERIOR, USING NASAL PACKING OR CAUTERIZATION;  Surgeon: Jono Russell MD;  Location: [facility] OR [facility] FLR;  Service: ENT;  Laterality: Bilateral;    CONTROL OF EPISTAXIS, POSTERIOR, USING NASAL PACKING OR CAUTERIZATION Bilateral 8/8/2024    Procedure: CONTROL OF EPISTAXIS, POSTERIOR, USING NASAL PACKING OR CAUTERIZATION;  Surgeon: Jono Russell MD;  [address]  Service: ENT;  Laterality: Bilateral;  suction bovie, nasopore, endoscopes    CORONARY ANGIOGRAPHY N/A 10/04/2021    Procedure: Left heart cath +/- peripheral angiogram;  Surgeon: Jose Ruiz MD;  Location: [facility] CATH LAB;  Service: Cardiology;  Laterality: N/A;    CORONARY ANGIOGRAPHY N/A 3/2/2023    Procedure: Angiogram, Coronary;  Surgeon: Devon Garnica MD;  Location: [facility] CATH LAB;  Service: Cardiology;  Laterality: N/A;    CORONARY ANGIOPLASTY      CORONARY ARTERY BYPASS GRAFT      CORONARY BYPASS GRAFT ANGIOGRAPHY  10/04/2021    Procedure: Bypass graft study;  Surgeon: Jose Ruiz MD;  Location: [facility] CATH LAB;  Service: Cardiology;;    CORONARY BYPASS GRAFT ANGIOGRAPHY  3/2/2023    Procedure: Bypass graft study;  Surgeon: Devon Garnica MD;  Location: [facility] CATH LAB;  Service: Cardiology;;    ECHOCARDIOGRAM,TRANSESOPHAGEAL N/A 4/14/2023    Procedure: Transesophageal echo (KIRSTEN) intra-procedure log documentation;  Surgeon: [facility] Diagnostic Provider;  Location: [facility] EP LAB;  Service: Cardiology;  Laterality: N/A;    ENDOSCOPIC NASAL CAUTERIZATION Bilateral 11/3/2023    Procedure: CAUTERIZATION, NOSE, ENDOSCOPIC;  Surgeon: Jono Russell MD;  Location: [address]  FLR;  Service: ENT;  Laterality: Bilateral;    ENDOSCOPIC NASAL CAUTERIZATION N/A 12/18/2023    Procedure: CAUTERIZATION, NOSE, ENDOSCOPIC;  Surgeon: Jono Russell MD;  Location: [facility] OR 2ND FLR;  Service: ENT;  Laterality: N/A;    EYE SURGERY      FESS, WITH IMAGING GUIDANCE Left 2/28/2024    Procedure: FESS, WITH IMAGING GUIDANCE;  Surgeon: Jono Russell MD;  Location: [facility] OR 2ND FLR;  Service: ENT;  Laterality: Left;  Scan loaded ENT [employer]. CL    FUNCTIONAL ENDOSCOPIC SINUS SURGERY (FESS) Bilateral 6/14/2023    Procedure: FESS (FUNCTIONAL ENDOSCOPIC SINUS SURGERY);  Surgeon: Jono Russell MD;  [facility]: [facility] OR 2ND FLR;  Service: ENT;  Laterality: Bilateral;    HERNIA REPAIR      INTRAOCULAR PROSTHESES INSERTION Left 11/13/2022    Procedure: INSERTION, IOL PROSTHESIS;  Surgeon: Alia Mckeon MD;  Location: [facility] OR 1ST FLR;  Service: Ophthalmology;  Laterality: Left;    INTRAOCULAR PROSTHESES INSERTION Right 12/04/2022    Procedure: INSERTION, IOL PROSTHESIS;  Surgeon: Alia Mckeon MD;  Location: [facility] OR 1ST FLR;  Service: Ophthalmology;  Laterality: Right;    LIGATION, ARTERY, ETHMOIDAL Left 2/28/2024    Procedure: LIGATION, ARTERY, ETHMOIDAL;  Surgeon: Jono Russell MD;  [facility]: [facility] OR 2ND FLR;  Service: ENT;  Laterality: Left;    LIGATION, ARTERY, SPHENOPALATINE, ENDOSCOPIC Bilateral 6/14/2023    Procedure: LIGATION, ARTERY, SPHENOPALATINE, ENDOSCOPIC;  Surgeon: Jono Russell MD;  [facility]: [facility] OR 2ND FLR;  Service: ENT;  Laterality: Bilateral;    NASAL ENDOSCOPY N/A 8/8/2024    Procedure: ENDOSCOPY, NOSE;  Surgeon: Jono Russell MD;  Location: [facility] OR 2ND FLR;  Service: ENT;  Laterality: N/A;    PHACOEMULSIFICATION OF CATARACT Left 11/13/2022    Procedure: PHACOEMULSIFICATION, CATARACT;  Surgeon: Alia Mckeon MD;  Location: [facility] OR 1ST FLR;  Service: Ophthalmology;  Laterality: Left;    PHACOEMULSIFICATION OF CATARACT Right 12/04/2022    Procedure:  PHACOEMULSIFICATION, CATARACT;  Surgeon: Alia Mckeon MD;  Location: [address];  Service: Ophthalmology;  Laterality: Right;    SPINE SURGERY      TRANSESOPHAGEAL ECHOCARDIOGRAPHY N/A 3/22/2023    Procedure: ECHOCARDIOGRAM, TRANSESOPHAGEAL;  Surgeon: [facility] Diagnostic Provider;  Location: [facility] EP LAB;  Service: Anesthesiology;  Laterality: N/A;    TRANSESOPHAGEAL ECHOCARDIOGRAPHY N/A 4/11/2023    Procedure: ECHOCARDIOGRAM, TRANSESOPHAGEAL;  Surgeon: [facility] Diagnostic Provider;  Location: [facility] EP LAB;  Service: Anesthesiology;  Laterality: N/A;    VASECTOMY       Family History   Problem Relation Name Age of Onset    Heart failure Mother      Heart disease Mother      Heart failure Father      Heart disease Father      Alcohol abuse Father      Heart failure Brother      Heart disease Brother      Diabetes Brother      No Known Problems Sister      No Known Problems Maternal Grandmother      No Known Problems Maternal Grandfather      No Known Problems Paternal Grandmother      No Known Problems Paternal Grandfather      Heart disease Sister      No Known Problems Maternal Aunt      No Known Problems Maternal Uncle      No Known Problems Paternal Aunt      No Known Problems Paternal Uncle      Amblyopia Neg Hx      Blindness Neg Hx      Cancer Neg Hx      Cataracts Neg Hx      Glaucoma Neg Hx      Hypertension Neg Hx      Macular degeneration Neg Hx      Retinal detachment Neg Hx      Strabismus Neg Hx      Stroke Neg Hx      Thyroid disease Neg Hx      Anemia Neg Hx      Arrhythmia Neg Hx      Asthma Neg Hx      Clotting disorder Neg Hx      Fainting Neg Hx      Heart attack Neg Hx      Hyperlipidemia Neg Hx      Atrial Septal Defect Neg Hx      Melanoma Neg Hx       Social History     Tobacco Use    Smoking status: Former     Current packs/day: 0.00     Average packs/day: 1 pack/day for 20.0 years (20.0 ttl pk-yrs)     Types: Cigarettes     Start date: 9/11/1960     Quit date: 9/11/1980     Years since  quittin.0     Passive exposure: Never    Smokeless tobacco: Never   Substance Use Topics    Alcohol use: No    Drug use: No     Review of Systems  See HPI    Physical Exam     Initial Vitals [24 0152]   BP Pulse Resp Temp SpO2   (!) 124/57 62 18 98.2 °F (36.8 °C) 99 %      MAP       --         Physical Exam    Nursing note and vitals reviewed.      Gen: AxOx3, well nourished, appears stated age, conjunctival pallor present, no jaundice, appears well hydrated  Eye: EOMI, no scleral icterus, no periorbital edema or ecchymosis  Head: Normocephalic, atraumatic, no lesions, scalp appears normal  ENT: Neck supple, no stridor, no masses, no drooling or voice changes  Dried blood around naris  No friable areas noted within anterior nasal passage  CVS: All distal pulses intact with normal rate and rhythm, no JVD, normal S1/S2, soft ES murmur  Pulm: Normal breath sounds, no wheezes, rales or rhonchi, no increased work of breathing  Abd:  Nondistended, soft, nontender, no organomegaly, no CVAT  Ext: No edema, no lesions, rashes, or deformity  Neuro: GCS15, moving all extremities, gait intact, face grossly symmetric  Psych: normal affect, cooperative, well groomed, makes good eye contact      ED Course   Procedures  Labs Reviewed   CBC W/ AUTO DIFFERENTIAL - Abnormal       Result Value    WBC 4.73      RBC 2.47 (*)     Hemoglobin 7.4 (*)     Hematocrit 24.5 (*)     MCV 99 (*)     MCH 30.0      MCHC 30.2 (*)     RDW 14.7 (*)     Platelets 188      MPV 11.0      Immature Granulocytes 0.2      Gran # (ANC) 2.8      Immature Grans (Abs) 0.01      Lymph # 1.3      Mono # 0.5      Eos # 0.1      Baso # 0.04      nRBC 0      Gran % 59.7      Lymph % 26.6      Mono % 11.2      Eosinophil % 1.5      Basophil % 0.8      Differential Method Automated     COMPREHENSIVE METABOLIC PANEL - Abnormal    Sodium 138      Potassium 4.7      Chloride 111 (*)     CO2 18 (*)     Glucose 95      BUN 68 (*)     Creatinine 2.2 (*)     Calcium  10.2      Total Protein 6.7      Albumin 3.0 (*)     Total Bilirubin 0.4      Alkaline Phosphatase 67      AST 21      ALT 17      eGFR 29.0 (*)     Anion Gap 9     PROTIME-INR - Abnormal    Prothrombin Time 24.8 (*)     INR 2.4 (*)    TYPE & SCREEN          Imaging Results    None          Medications   oxymetazoline 0.05 % nasal spray 1 spray (has no administration in time range)     Medical Decision Making  Initial assessment  84-year-old male with past medical history of CHF, CKD, mechanical heart valve on Coumadin, anemia, NSTEMI, PVD and type 2 diabetes and recurrent epistaxis with recent right maxillary, 2 left maxillary collaterals, and a dominant left facial arteries on 8/7 (additionally requiring 3 units of blood) now presents with a chief complaint of epistaxis. Patient is able to vocalise, breathing spontaneously, hemodynamically stable, oriented, moving all 4 limbs spontaneously.  Examination consistent with conjunctival pallor and dried blood around naris.      Differential diagnosis  Epistaxis considered anterior versus posterior  Anemia  Supratherapeutic INR      ED management  Patient did not have any active bleeding upon arrival in the emergency department, however given pallor history of significant epistaxis I was concerned for anemia in setting of supratherapeutic INR.  Workup consistent with CMP showing baseline CKD.  CBC showing hemoglobin of 7.4 which is grossly patient's baseline.  INR was 2.4 and therapeutic.  I had lengthy conversation with patient regarding admission for observation of ongoing bleeding versus discharged with strict return precautions.  Patient wishes to be discharged and he has ENT follow-up in 3 days with whom he will follow up with.  Patient instructed to return to the emergency department if he has worsening bleeding, weakness, or shortness of breath.  Patient discharged    Amount and/or Complexity of Data Reviewed  Labs: ordered. Decision-making details documented in  ED Course.    Risk  OTC drugs.               ED Course as of 09/22/24 0555   Sun Sep 22, 2024   0514 WBC: 4.73 [PM]   0514 Hemoglobin(!): 7.4  Consistent with baseline [PM]   0542 Sodium: 138 [PM]   0542 Potassium: 4.7 [PM]   0542 BUN(!): 68 [PM]   0542 Creatinine(!): 2.2 [PM]   0543 Comprehensive metabolic panel(!)  Baseline CKD [PM]   0543 INR(!): 2.4  Therapeutic [PM]      ED Course User Index  [PM] Marva Mart MD                           Clinical Impression:  Final diagnoses:  [R04.0] Epistaxis (Primary)  [D64.9] Anemia, unspecified type          ED Disposition Condition    Discharge Stable          ED Prescriptions    None       Follow-up Information       Follow up With Specialties Details Why Contact Info    Devon Langston Jr., MD Internal Medicine   [address]  [place] [address]  [phone]      [facility] - Emergency Dept Emergency Medicine  As needed, If symptoms worsen [address] [id number] [phone]    [facility] ENT Otolaryngology   [address] [address]  [phone]             Marva Mart MD  Resident  09/22/24 0555

## 2024-09-22 NOTE — ED TRIAGE NOTES
Dariel Ubrina, a 84 y.o. male presents to the ED w/ complaint of nose bleed started around 9am, clots coming out earlier. +blood thinners. No longer bleeding in intake    Triage note:  Chief Complaint   Patient presents with    Epistaxis     Nosebleed started around 9am, states having clots coming out. +blood thinners, bleeding controlled in triage     Review of patient's allergies indicates:   Allergen Reactions    Lisinopril     Losartan      Intolerance- elevates potassium level     Past Medical History:   Diagnosis Date    Anemia of chronic renal failure, stage 3 (moderate) 05/27/2015    Anticoagulant long-term use     Atherosclerosis of coronary artery bypass graft of native heart without angina pectoris 09/11/2012    3-27-18 [facility] Two vessel coronary artery disease.   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    Bilateral carotid artery disease 02/09/2017    Bleeding from the nose     Bleeding nose 03/21/2018    Cancer     Cataract     CHF (congestive heart failure)     CKD (chronic kidney disease) stage 3, GFR 30-59 ml/min 05/27/2015    Claudication of left lower extremity 09/17/2014    Colon polyp     Encounter for blood transfusion     Gastroesophageal reflux disease without esophagitis 03/19/2018    Gastrointestinal hemorrhage associated with intestinal diverticulosis 04/01/2018    Glaucoma     H/O mechanical aortic valve replacement 09/17/2014    History of gout 09/26/2012    Hyperparathyroidism due to renal insufficiency 07/27/2015    Internal hemorrhoid 04/03/2018    Long term current use of anticoagulant therapy 09/26/2012    Mechanical heart valve present     Metabolic acidosis with normal anion gap and bicarbonate losses 03/20/2018    Mixed hyperlipidemia 09/26/2012    NSTEMI (non-ST elevated myocardial infarction) 03/21/2018    Obesity, diabetes, and hypertension syndrome 02/23/2016    Paroxysmal atrial fibrillation 02/06/2023    PVD (peripheral vascular disease)  09/11/2012    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    LOC: The patient is awake, alert and aware of environment with an appropriate affect, the patient is oriented x 4 and speaking appropriately.    APPEARANCE: Patient resting comfortably and in no acute distress, patient is clean and well groomed, patient's clothing is properly fastened.    SKIN: The skin is warm and dry, color consistent with ethnicity, patient has normal skin turgor and moist mucus membranes, skin intact, no breakdown or bruising noted.    MUSCULOSKELETAL: Patient moving all extremities well, no obvious swelling or deformities noted.    RESPIRATORY: Airway is open and patent, respirations are spontaneous and even, patient has a normal effort and rate.    CARDIAC: Patient has a normal rate and rhythm, no periphreal edema noted, capillary refill < 3 seconds. +blood thinners    ABDOMEN: Soft and non tender to palpation, no distention noted. Patient denies any nausea, vomiting, diarrhea, or constipation.     NEUROLOGIC: Eyes open spontaneously, PERRL, behavior appropriate to situation, follows commands, facial expression symmetrical, bilateral hand grasp equal and even, purposeful motor response noted, normal sensation in all extremities.     HEENT: No abnormalities noted. White sclera and pupils equal round and reactive to light. Denies headache, dizziness. Nose bleed     : Pt voids independently, denies dysuria, hematuria, frequency.

## 2024-09-23 ENCOUNTER — PATIENT OUTREACH (OUTPATIENT)
Dept: EMERGENCY MEDICINE | Facility: HOSPITAL | Age: 84
End: 2024-09-23
Payer: MEDICARE

## 2024-09-23 NOTE — PROGRESS NOTES
Spoke to Pt's wife stating that he is doing ok and is currently resting. He has a f/u appt with [facility], Otolaryngology on 9-24-24. She states he has had 4 sx on his nose. She denies wanting to schedule anything at this time. ED navigator will follow-up with patient as needed to provide assist.

## 2024-09-24 ENCOUNTER — OFFICE VISIT (OUTPATIENT)
Dept: OTOLARYNGOLOGY | Facility: CLINIC | Age: 84
End: 2024-09-24
Payer: MEDICARE

## 2024-09-24 DIAGNOSIS — Z79.01 ANTICOAGULANT LONG-TERM USE: ICD-10-CM

## 2024-09-24 DIAGNOSIS — N18.32 STAGE 3B CHRONIC KIDNEY DISEASE: ICD-10-CM

## 2024-09-24 DIAGNOSIS — R04.0 EPISTAXIS: Primary | ICD-10-CM

## 2024-09-24 DIAGNOSIS — J34.89 NASAL SEPTAL PERFORATION: ICD-10-CM

## 2024-09-24 DIAGNOSIS — J34.3 HYPERTROPHY OF BOTH INFERIOR NASAL TURBINATES: ICD-10-CM

## 2024-09-24 PROCEDURE — 99999 PR PBB SHADOW E&M-EST. PATIENT-LVL II: CPT | Mod: PBBFAC,HCNC,, | Performed by: STUDENT IN AN ORGANIZED HEALTH CARE EDUCATION/TRAINING PROGRAM

## 2024-09-24 NOTE — PROGRESS NOTES
Subjective:      Dariel is a 84 y.o. male who comes for follow-up of  epistaxis . His last visit of me was on 8/16/24. Was seen in the ED 2 days ago with bleeding, mostly from the left. Bleeding stopped prior to arrival H/H stable on check. INR therapeutic. Has still been having episodes of bleeding.     He previously undergone bilateral SP ligation followed by bilateral embolization, cautery on the left septum for persistent bleeding on 11/03/2023 and again recently on 12/18/23. He recently underwent left AEA ligation on 2/28/24 and recently underwent nasal endoscopy and control of epistaxis on 6/18/24.  He then had repeat embolization on 08/06/24.     His current sinus regime consists of: Saline.     The assessment of quality and severity of symptoms as measured by the SNOT-22 score is deferred.    The patient's medications, allergies, past medical, surgical, social and family histories were reviewed and updated as appropriate.    ROS     A detailed review of systems was obtained with pertinent positives as per the above HPI, and otherwise negative.        Objective:     There were no vitals taken for this visit.       Constitutional:   Vital signs are normal. He appears well-developed and well-nourished.     Head:  Normocephalic and atraumatic.     Ears:  Hearing normal to normal and whispered voice; external ear normal without scars, lesions, or masses; ear canal, tympanic membrane, and middle ear normal..   Right Ear: No swelling. Tympanic membrane is not perforated and not bulging. No middle ear effusion.   Left Ear: No swelling. Tympanic membrane is not perforated and not bulging.  No middle ear effusion.     Nose:  Nose normal including turbinates, nasal mucosa, sinuses and nasal septum. No epistaxis.         Mouth/Throat  Oropharynx clear and moist without lesions or asymmetry and normal uvula midline. Normal dentition. No tonsillar abscesses. Tonsillar exudate.      Neck:  Neck normal without  "thyromegaly masses, asymmetry, normal tracheal structure, crepitus, and tenderness, thyroid normal, trachea normal, phonation normal, full range of motion with neck supple and no adenopathy. No stridor present.        Head (right side): No submental adenopathy present.        Head (left side): No submental adenopathy present.     He has no cervical adenopathy.     Pulmonary/Chest:   No stridor.     Procedure    Nasal Endoscopy with Control of Epistaxis    After written consent was obtained the nose was anesthetized with topical pontocaine and phenylephrine pledgets.  silver nitrate was used to cauterize the prominent vascularity on the left superior septum.  The patient tolerated the procedure well and there were no complications.  Hemostasis was obtained.  Bacitracin ointment was placed after cauterization.    Data Reviewed    WBC (K/uL)   Date Value   09/22/2024 4.73     Eosinophil % (%)   Date Value   09/22/2024 1.5     Eos # (K/uL)   Date Value   09/22/2024 0.1     Platelets (K/uL)   Date Value   09/22/2024 188     Glucose (mg/dL)   Date Value   09/22/2024 95     No results found for: "IGE"    No sinus imaging available.    Assessment:     1. Epistaxis    2. Hypertrophy of both inferior nasal turbinates    3. Nasal septal perforation    4. Stage 3b chronic kidney disease    5. Anticoagulant long-term use           Plan:     - Fibrillar packed in left nasal cavity along with AgNO3 cautery  - Has coumadin clinic visit tomorrow to check H/H  - RTC 1 month    Jono Russell MD     "

## 2024-09-25 ENCOUNTER — ANTI-COAG VISIT (OUTPATIENT)
Dept: CARDIOLOGY | Facility: CLINIC | Age: 84
End: 2024-09-25
Payer: MEDICARE

## 2024-09-25 DIAGNOSIS — Z79.01 CHRONIC ANTICOAGULATION: Primary | ICD-10-CM

## 2024-09-25 DIAGNOSIS — Z95.2 H/O MECHANICAL AORTIC VALVE REPLACEMENT: ICD-10-CM

## 2024-09-25 PROCEDURE — 93793 ANTICOAG MGMT PT WARFARIN: CPT | Mod: S$GLB,,,

## 2024-09-25 NOTE — PROGRESS NOTES
[facility]    09/25/2024 10:03 AM    Assessment/Plan:    Patient presents today with supratherapeutic INR.    Assessment of patient findings and chart review: Pt continues to trend above range.     Recommendation for patient's warfarin regimen: Decrease maintenance dose     Recommend repeat INR in 1 week.  Will continue to monitor closely.   _________________________________________________________________    Dariel Urbina (84 y.o.) is followed by the [facility].    Anticoagulation Summary  As of 9/25/2024      INR goal:  2.0-3.0   TTR:  67.5% (12 y)   INR used for dosing:  3.2 (9/25/2024)   Warfarin maintenance plan:  2.5 mg (5 mg x 0.5) every Fri; 5 mg (5 mg x 1) all other days   Weekly warfarin total:  32.5 mg   Plan last modified:  Laurie Patino, PharmD (9/5/2024)   Next INR check:     Target end date:      Indications    Chronic anticoagulation [Z79.01]  H/O mechanical aortic valve replacement [Z95.2]                 Anticoagulation Episode Summary       INR check location:  Clinic Lab    Preferred lab:      Send INR reminders to:  [facility] COUMADIN MONITORING POOL    Comments:  [facility] Med Clinic only Mon - Thu // carpel tunnel release 5/19 - target low end of range          Anticoagulation Care Providers       Provider Role Specialty Phone number    Devon Garnica MD [facility] Cardiology [phone]

## 2024-09-26 ENCOUNTER — HOSPITAL ENCOUNTER (INPATIENT)
Facility: HOSPITAL | Age: 84
LOS: 1 days | Discharge: HOME | DRG: 811 | End: 2024-09-28
Attending: EMERGENCY MEDICINE | Admitting: HOSPITALIST
Payer: MEDICARE

## 2024-09-26 ENCOUNTER — TELEPHONE (OUTPATIENT)
Dept: OTOLARYNGOLOGY | Facility: CLINIC | Age: 84
End: 2024-09-26
Payer: MEDICARE

## 2024-09-26 DIAGNOSIS — D62 ACUTE BLOOD LOSS ANEMIA: ICD-10-CM

## 2024-09-26 DIAGNOSIS — R07.9 CHEST PAIN: ICD-10-CM

## 2024-09-26 DIAGNOSIS — N17.9 AKI (ACUTE KIDNEY INJURY): ICD-10-CM

## 2024-09-26 DIAGNOSIS — I50.9 CONGESTIVE HEART FAILURE, UNSPECIFIED HF CHRONICITY, UNSPECIFIED HEART FAILURE TYPE: ICD-10-CM

## 2024-09-26 DIAGNOSIS — I48.91 ATRIAL FIBRILLATION: ICD-10-CM

## 2024-09-26 DIAGNOSIS — R07.9 CHEST PAIN, UNSPECIFIED TYPE: Primary | ICD-10-CM

## 2024-09-26 DIAGNOSIS — D64.9 ANEMIA, UNSPECIFIED TYPE: ICD-10-CM

## 2024-09-26 DIAGNOSIS — I50.9 HF (HEART FAILURE): ICD-10-CM

## 2024-09-26 LAB
ABO + RH BLD: NORMAL
ALBUMIN SERPL BCP-MCNC: 3.2 G/DL (ref 3.5–5.2)
ALP SERPL-CCNC: 79 U/L (ref 55–135)
ALT SERPL W/O P-5'-P-CCNC: 21 U/L (ref 10–44)
ANION GAP SERPL CALC-SCNC: 8 MMOL/L (ref 8–16)
AST SERPL-CCNC: 24 U/L (ref 10–40)
BASOPHILS # BLD AUTO: 0.03 K/UL (ref 0–0.2)
BASOPHILS NFR BLD: 0.8 % (ref 0–1.9)
BILIRUB SERPL-MCNC: 0.3 MG/DL (ref 0.1–1)
BILIRUB UR QL STRIP: NEGATIVE
BLD GP AB SCN CELLS X3 SERPL QL: NORMAL
BLD PROD TYP BPU: NORMAL
BLOOD UNIT EXPIRATION DATE: NORMAL
BLOOD UNIT TYPE CODE: 9500
BLOOD UNIT TYPE: NORMAL
BNP SERPL-MCNC: 1357 PG/ML (ref 0–99)
BUN SERPL-MCNC: 61 MG/DL (ref 8–23)
CALCIUM SERPL-MCNC: 10.3 MG/DL (ref 8.7–10.5)
CHLORIDE SERPL-SCNC: 109 MMOL/L (ref 95–110)
CLARITY UR REFRACT.AUTO: CLEAR
CO2 SERPL-SCNC: 22 MMOL/L (ref 23–29)
CODING SYSTEM: NORMAL
COLOR UR AUTO: COLORLESS
CREAT SERPL-MCNC: 2.6 MG/DL (ref 0.5–1.4)
CROSSMATCH INTERPRETATION: NORMAL
DIFFERENTIAL METHOD BLD: ABNORMAL
DISPENSE STATUS: NORMAL
EOSINOPHIL # BLD AUTO: 0.1 K/UL (ref 0–0.5)
EOSINOPHIL NFR BLD: 2.5 % (ref 0–8)
ERYTHROCYTE [DISTWIDTH] IN BLOOD BY AUTOMATED COUNT: 15 % (ref 11.5–14.5)
EST. GFR  (NO RACE VARIABLE): 23.7 ML/MIN/1.73 M^2
GLUCOSE SERPL-MCNC: 130 MG/DL (ref 70–110)
GLUCOSE UR QL STRIP: NEGATIVE
HCT VFR BLD AUTO: 20 % (ref 40–54)
HGB BLD-MCNC: 6.4 G/DL (ref 14–18)
HGB UR QL STRIP: NEGATIVE
IMM GRANULOCYTES # BLD AUTO: 0.01 K/UL (ref 0–0.04)
IMM GRANULOCYTES NFR BLD AUTO: 0.3 % (ref 0–0.5)
INR PPP: 2.6 (ref 0.8–1.2)
KETONES UR QL STRIP: NEGATIVE
LEUKOCYTE ESTERASE UR QL STRIP: NEGATIVE
LYMPHOCYTES # BLD AUTO: 0.9 K/UL (ref 1–4.8)
LYMPHOCYTES NFR BLD: 21.8 % (ref 18–48)
MCH RBC QN AUTO: 31.2 PG (ref 27–31)
MCHC RBC AUTO-ENTMCNC: 32 G/DL (ref 32–36)
MCV RBC AUTO: 98 FL (ref 82–98)
MONOCYTES # BLD AUTO: 0.4 K/UL (ref 0.3–1)
MONOCYTES NFR BLD: 10.8 % (ref 4–15)
NEUTROPHILS # BLD AUTO: 2.6 K/UL (ref 1.8–7.7)
NEUTROPHILS NFR BLD: 63.8 % (ref 38–73)
NITRITE UR QL STRIP: NEGATIVE
NRBC BLD-RTO: 0 /100 WBC
OHS QRS DURATION: 158 MS
OHS QTC CALCULATION: 488 MS
PH UR STRIP: 7 [PH] (ref 5–8)
PLATELET # BLD AUTO: 185 K/UL (ref 150–450)
PMV BLD AUTO: 10.5 FL (ref 9.2–12.9)
POCT GLUCOSE: 106 MG/DL (ref 70–110)
POCT GLUCOSE: 159 MG/DL (ref 70–110)
POCT GLUCOSE: 47 MG/DL (ref 70–110)
POTASSIUM SERPL-SCNC: 4.9 MMOL/L (ref 3.5–5.1)
PROT SERPL-MCNC: 6.8 G/DL (ref 6–8.4)
PROT UR QL STRIP: NEGATIVE
PROTHROMBIN TIME: 26.5 SEC (ref 9–12.5)
RBC # BLD AUTO: 2.05 M/UL (ref 4.6–6.2)
SODIUM SERPL-SCNC: 139 MMOL/L (ref 136–145)
SP GR UR STRIP: 1 (ref 1–1.03)
SPECIMEN OUTDATE: NORMAL
TRANS ERYTHROCYTES VOL PATIENT: NORMAL ML
TROPONIN I SERPL DL<=0.01 NG/ML-MCNC: 0.03 NG/ML (ref 0–0.03)
TROPONIN I SERPL DL<=0.01 NG/ML-MCNC: 0.04 NG/ML (ref 0–0.03)
URN SPEC COLLECT METH UR: ABNORMAL
WBC # BLD AUTO: 4 K/UL (ref 3.9–12.7)

## 2024-09-26 PROCEDURE — 84484 ASSAY OF TROPONIN QUANT: CPT | Mod: 91,HCNC | Performed by: NURSE PRACTITIONER

## 2024-09-26 PROCEDURE — P9021 RED BLOOD CELLS UNIT: HCPCS | Mod: HCNC

## 2024-09-26 PROCEDURE — 99285 EMERGENCY DEPT VISIT HI MDM: CPT | Mod: 25,HCNC

## 2024-09-26 PROCEDURE — 93010 ELECTROCARDIOGRAM REPORT: CPT | Mod: HCNC,,, | Performed by: INTERNAL MEDICINE

## 2024-09-26 PROCEDURE — 93005 ELECTROCARDIOGRAM TRACING: CPT | Mod: HCNC

## 2024-09-26 PROCEDURE — 83880 ASSAY OF NATRIURETIC PEPTIDE: CPT | Mod: HCNC | Performed by: NURSE PRACTITIONER

## 2024-09-26 PROCEDURE — 63600175 PHARM REV CODE 636 W HCPCS: Mod: HCNC

## 2024-09-26 PROCEDURE — 86850 RBC ANTIBODY SCREEN: CPT | Mod: HCNC | Performed by: NURSE PRACTITIONER

## 2024-09-26 PROCEDURE — 30233N1 TRANSFUSION OF NONAUTOLOGOUS RED BLOOD CELLS INTO PERIPHERAL VEIN, PERCUTANEOUS APPROACH: ICD-10-PCS | Performed by: EMERGENCY MEDICINE

## 2024-09-26 PROCEDURE — 86900 BLOOD TYPING SEROLOGIC ABO: CPT | Mod: HCNC | Performed by: NURSE PRACTITIONER

## 2024-09-26 PROCEDURE — G0378 HOSPITAL OBSERVATION PER HR: HCPCS | Mod: HCNC

## 2024-09-26 PROCEDURE — 85610 PROTHROMBIN TIME: CPT | Mod: HCNC

## 2024-09-26 PROCEDURE — 96374 THER/PROPH/DIAG INJ IV PUSH: CPT | Mod: HCNC

## 2024-09-26 PROCEDURE — 93010 ELECTROCARDIOGRAM REPORT: CPT | Mod: HCNC,76,, | Performed by: INTERNAL MEDICINE

## 2024-09-26 PROCEDURE — 80053 COMPREHEN METABOLIC PANEL: CPT | Mod: HCNC | Performed by: NURSE PRACTITIONER

## 2024-09-26 PROCEDURE — 82962 GLUCOSE BLOOD TEST: CPT | Mod: HCNC

## 2024-09-26 PROCEDURE — 86920 COMPATIBILITY TEST SPIN: CPT | Mod: HCNC

## 2024-09-26 PROCEDURE — 25000003 PHARM REV CODE 250: Mod: HCNC

## 2024-09-26 PROCEDURE — 86901 BLOOD TYPING SEROLOGIC RH(D): CPT | Mod: HCNC | Performed by: NURSE PRACTITIONER

## 2024-09-26 PROCEDURE — 85025 COMPLETE CBC W/AUTO DIFF WBC: CPT | Mod: HCNC | Performed by: NURSE PRACTITIONER

## 2024-09-26 PROCEDURE — 81003 URINALYSIS AUTO W/O SCOPE: CPT | Mod: HCNC | Performed by: NURSE PRACTITIONER

## 2024-09-26 RX ORDER — SODIUM CHLORIDE 0.9 % (FLUSH) 0.9 %
10 SYRINGE (ML) INJECTION EVERY 12 HOURS PRN
Status: DISCONTINUED | OUTPATIENT
Start: 2024-09-26 | End: 2024-09-28 | Stop reason: HOSPADM

## 2024-09-26 RX ORDER — ACETAMINOPHEN 325 MG/1
650 TABLET ORAL EVERY 4 HOURS PRN
Status: DISCONTINUED | OUTPATIENT
Start: 2024-09-26 | End: 2024-09-28 | Stop reason: HOSPADM

## 2024-09-26 RX ORDER — GLUCAGON 1 MG
1 KIT INJECTION
Status: CANCELLED | OUTPATIENT
Start: 2024-09-26

## 2024-09-26 RX ORDER — ISOSORBIDE MONONITRATE 60 MG/1
60 TABLET, EXTENDED RELEASE ORAL NIGHTLY
Status: DISCONTINUED | OUTPATIENT
Start: 2024-09-26 | End: 2024-09-28 | Stop reason: HOSPADM

## 2024-09-26 RX ORDER — FUROSEMIDE 10 MG/ML
40 INJECTION INTRAMUSCULAR; INTRAVENOUS DAILY
Status: DISCONTINUED | OUTPATIENT
Start: 2024-09-27 | End: 2024-09-28 | Stop reason: HOSPADM

## 2024-09-26 RX ORDER — IBUPROFEN 200 MG
24 TABLET ORAL
Status: CANCELLED | OUTPATIENT
Start: 2024-09-26

## 2024-09-26 RX ORDER — INSULIN ASPART 100 [IU]/ML
0-5 INJECTION, SOLUTION INTRAVENOUS; SUBCUTANEOUS
Status: DISCONTINUED | OUTPATIENT
Start: 2024-09-26 | End: 2024-09-28 | Stop reason: HOSPADM

## 2024-09-26 RX ORDER — ONDANSETRON 8 MG/1
8 TABLET, ORALLY DISINTEGRATING ORAL EVERY 8 HOURS PRN
Status: DISCONTINUED | OUTPATIENT
Start: 2024-09-26 | End: 2024-09-28 | Stop reason: HOSPADM

## 2024-09-26 RX ORDER — NALOXONE HCL 0.4 MG/ML
0.02 VIAL (ML) INJECTION
Status: DISCONTINUED | OUTPATIENT
Start: 2024-09-26 | End: 2024-09-28 | Stop reason: HOSPADM

## 2024-09-26 RX ORDER — ATORVASTATIN CALCIUM 20 MG/1
80 TABLET, FILM COATED ORAL DAILY
Status: DISCONTINUED | OUTPATIENT
Start: 2024-09-27 | End: 2024-09-28 | Stop reason: HOSPADM

## 2024-09-26 RX ORDER — GLUCAGON 1 MG
1 KIT INJECTION
Status: DISCONTINUED | OUTPATIENT
Start: 2024-09-26 | End: 2024-09-28 | Stop reason: HOSPADM

## 2024-09-26 RX ORDER — HYDROCODONE BITARTRATE AND ACETAMINOPHEN 500; 5 MG/1; MG/1
TABLET ORAL
Status: DISCONTINUED | OUTPATIENT
Start: 2024-09-26 | End: 2024-09-28 | Stop reason: HOSPADM

## 2024-09-26 RX ORDER — IBUPROFEN 200 MG
24 TABLET ORAL
Status: DISCONTINUED | OUTPATIENT
Start: 2024-09-26 | End: 2024-09-28 | Stop reason: HOSPADM

## 2024-09-26 RX ORDER — FUROSEMIDE 10 MG/ML
40 INJECTION INTRAMUSCULAR; INTRAVENOUS
Status: COMPLETED | OUTPATIENT
Start: 2024-09-26 | End: 2024-09-26

## 2024-09-26 RX ORDER — IBUPROFEN 200 MG
16 TABLET ORAL
Status: DISCONTINUED | OUTPATIENT
Start: 2024-09-26 | End: 2024-09-28 | Stop reason: HOSPADM

## 2024-09-26 RX ORDER — NALOXONE HCL 0.4 MG/ML
0.02 VIAL (ML) INJECTION
Status: CANCELLED | OUTPATIENT
Start: 2024-09-26

## 2024-09-26 RX ORDER — IBUPROFEN 200 MG
16 TABLET ORAL
Status: CANCELLED | OUTPATIENT
Start: 2024-09-26

## 2024-09-26 RX ORDER — SODIUM CHLORIDE 0.9 % (FLUSH) 0.9 %
10 SYRINGE (ML) INJECTION EVERY 12 HOURS PRN
Status: CANCELLED | OUTPATIENT
Start: 2024-09-26

## 2024-09-26 RX ORDER — FERROUS GLUCONATE 324(37.5)
324 TABLET ORAL EVERY OTHER DAY
Status: DISCONTINUED | OUTPATIENT
Start: 2024-09-27 | End: 2024-09-28 | Stop reason: HOSPADM

## 2024-09-26 RX ADMIN — ISOSORBIDE MONONITRATE 60 MG: 60 TABLET, EXTENDED RELEASE ORAL at 10:09

## 2024-09-26 RX ADMIN — FUROSEMIDE 40 MG: 10 INJECTION, SOLUTION INTRAVENOUS at 05:09

## 2024-09-26 NOTE — ED NOTES
Pt to ED for CP and low H&H. Pt denies CP at this time.    LOC: The patient is awake, alert and aware of environment with an appropriate affect, the patient is oriented x 3 and speaking appropriately.  APPEARANCE: Patient resting comfortably and in no acute distress, patient is clean and well groomed, patient's clothing is properly fastened.  SKIN: The skin is warm and dry, color consistent with ethnicity, patient has normal skin turgor and moist mucus membranes, skin intact, no breakdown or bruising noted.  MUSCULOSKELETAL: Patient moving all extremities spontaneously, no obvious swelling or deformities noted.  RESPIRATORY: Airway is open and patent, respirations are spontaneous, patient has a normal effort and rate, no accessory muscle use noted.  CARDIAC: Patient afib in the 50's, no periphreal edema noted, capillary refill < 3 seconds.  ABDOMEN: Soft and non tender to palpation, no distention noted, normoactive bowel sounds present in all four quadrants.  NEUROLOGIC:  facial expression is symmetrical, patient moving all extremities spontaneously, normal sensation in all extremities when touched with a finger.  Follows all commands appropriately.

## 2024-09-26 NOTE — Clinical Note
Diagnosis: Chest pain [908985]   Future Attending Provider: AJAY PASCUAL [[id number]]   Special Needs:: No Special Needs [1]

## 2024-09-26 NOTE — FIRST PROVIDER EVALUATION
Emergency Department TeleTriage Encounter Note      CHIEF COMPLAINT    Chief Complaint   Patient presents with    Chest Pain     Chest pain since yesterday, had blood work done on 25th and Hgb was 6.5 so was told to come to ER for blood transfusion        VITAL SIGNS   Initial Vitals [09/26/24 1525]   BP Pulse Resp Temp SpO2   (!) 132/100 (!) 51 16 98 °F (36.7 °C) 97 %      MAP       --            ALLERGIES    Review of patient's allergies indicates:   Allergen Reactions    Lisinopril     Losartan      Intolerance- elevates potassium level       PROVIDER TRIAGE NOTE  Verbal consent for the teletriage evaluation was given by the patient at the start of the evaluation.  All efforts will be made to maintain patient's privacy during the evaluation.      This is a teletriage evaluation of a 84 y.o. male presenting to the ED with c/o called by MD and told to come in due to low H&H.  Also reports CP that started yesterday. Limited physical exam via telehealth: The patient is awake, alert, answering questions appropriately and is not in respiratory distress.  As the Teletriage provider, I performed an initial assessment and ordered appropriate labs and imaging studies, if any, to facilitate the patient's care once placed in the ED. Once a room is available, care and a full evaluation will be completed by an alternate ED provider.  Any additional orders and the final disposition will be determined by that provider.  All imaging and labs will not be followed-up by the Teletriage Team, including myself.          ORDERS  Labs Reviewed - No data to display    ED Orders (720h ago, onward)      Start Ordered     Status Ordering Provider    09/26/24 1840 09/26/24 1539  Troponin I #2  Once Timed         Ordered PILAR NICKERSON    09/26/24 1540 09/26/24 1539  Vital signs  Every 15 min         Ordered PILAR NICKERSON    09/26/24 1540 09/26/24 1539  Cardiac Monitoring - Adult  Continuous        Comments: Notify Physician If:    Ordered  PILAR NICKERSON    09/26/24 1540 09/26/24 1539  Pulse Oximetry Continuous  Continuous         Ordered PILAR NICKERSON    09/26/24 1540 09/26/24 1539  Diet NPO  Diet effective now         Ordered PILAR NICKERSON    09/26/24 1540 09/26/24 1539  Saline lock IV  Once         Ordered PILAR NICKERSON    09/26/24 1540 09/26/24 1539  EKG 12-lead  Once        Comments: Do not perform if previously done during this visit/ triage    Ordered IPLAR NICKERSON    09/26/24 1540 09/26/24 1539  CBC auto differential  STAT         Ordered PILAR NICKERSON    09/26/24 1540 09/26/24 1539  Comprehensive metabolic panel  STAT         Ordered PILAR NICKERSON    09/26/24 1540 09/26/24 1539  Troponin I #1  STAT         Ordered PILAR NICKERSON    09/26/24 1540 09/26/24 1539  B-Type natriuretic peptide (BNP)  STAT         Ordered PILAR NICKERSON    09/26/24 1540 09/26/24 1539  X-Ray Chest AP Portable  1 time imaging         Ordered PILAR NICKERSON    09/26/24 1540 09/26/24 1539  Urinalysis, Reflex to Urine Culture Urine, Clean Catch  STAT         Ordered PILAR NICKERSON    09/26/24 1540 09/26/24 1539  Type & Screen  STAT         Ordered PILAR NICKERSON    09/26/24 1528 09/26/24 1527  EKG 12-lead  Once         Final result ERIKA ROBERTO              Virtual Visit Note: The provider triage portion of this emergency department evaluation and documentation was performed via Clifford Thames, a HIPAA-compliant telemedicine application, in concert with a tele-presenter in the room. A face to face patient evaluation with one of my colleagues will occur once the patient is placed in an emergency department room.      DISCLAIMER: This note was prepared with [employer] voice recognition transcription software. Garbled syntax, mangled pronouns, and other bizarre constructions may be attributed to that software system.

## 2024-09-26 NOTE — HPI
Dariel Urbina is a 83 year old male with a PMHx of CHF, CKD stage 4, CAD s/p CABG (1990s), mechanical AVR (on warfarin), moderate-severe MR, recurrent epistaxis, anemia, NSTEMI, PVD, recurrent epistaxis and type 2 diabetes presenting to the ED for low hgb and chest pain.     Patient has been having recurrent episodes of epistaxis for the past few months and has been followed by ENT. He previously had undergone bilateral SP ligation followed by bilateral embolization, cautery on the left septum for persistent bleeding on 11/03/2023 and again recently on 12/18/23. He recently underwent left AEA ligation on 2/28/24 and recently underwent nasal endoscopy and control of epistaxis on 6/18/24. He then had repeat embolization on 08/06/24. These procedures have not fully controlled epistaxis. Patient also on coumadin for an aortic valve replacement for which he was undergoing H/H. It was found that his Hgb was 6.5. Patient was called and recommended to come into the ED for further evaluation and pRBC.     Patient has noted increased dizziness and lightheadedness for the past few days worse than baseline. It has not affected his ADLs however he noted that he has had to be more careful performing daily tasks. Patient also with complaints of chest pain and shortness of breath. He states that his chest pain typically occurs on exertion after waling appx 10-20 steps after resting. These symptoms go away at rest. Patient with hx CAD on IMDUR at home which he mentions helps with his chest pain, however he has noticed his shortness of breath has been mildly worsened.    In the ED, VSS. Labs significant for Hgb 6.4, Cr 2.6 (BL 1.7-2.2, consistently elevated on previous admissions), BNP 1357, troponin 0.041. CXR without evidence of pulmonary edema. Patient given 1 U pRBC and 40 mg IV lasix and admitted to hospital medicine for further workup and evaluation

## 2024-09-26 NOTE — SUBJECTIVE & OBJECTIVE
Past Medical History:   Diagnosis Date    Anemia of chronic renal failure, stage 3 (moderate) 05/27/2015    Anticoagulant long-term use     Atherosclerosis of coronary artery bypass graft of native heart without angina pectoris 09/11/2012    3-27-18 [facility] Two vessel coronary artery disease.   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    Bilateral carotid artery disease 02/09/2017    Bleeding from the nose     Bleeding nose 03/21/2018    Cancer     Cataract     CHF (congestive heart failure)     CKD (chronic kidney disease) stage 3, GFR 30-59 ml/min 05/27/2015    Claudication of left lower extremity 09/17/2014    Colon polyp     Encounter for blood transfusion     Gastroesophageal reflux disease without esophagitis 03/19/2018    Gastrointestinal hemorrhage associated with intestinal diverticulosis 04/01/2018    Glaucoma     H/O mechanical aortic valve replacement 09/17/2014    History of gout 09/26/2012    Hyperparathyroidism due to renal insufficiency 07/27/2015    Internal hemorrhoid 04/03/2018    Long term current use of anticoagulant therapy 09/26/2012    Mechanical heart valve present     Metabolic acidosis with normal anion gap and bicarbonate losses 03/20/2018    Mixed hyperlipidemia 09/26/2012    NSTEMI (non-ST elevated myocardial infarction) 03/21/2018    Obesity, diabetes, and hypertension syndrome 02/23/2016    Paroxysmal atrial fibrillation 02/06/2023    PVD (peripheral vascular disease) 09/11/2012    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       Past Surgical History:   Procedure Laterality Date    BACK SURGERY      CARDIAC CATHETERIZATION      CARDIAC VALVE  REPLACEMENT      CARDIAC VALVE SURGERY      CARPAL TUNNEL RELEASE Right 05/19/2020    Procedure: RELEASE, CARPAL TUNNEL;  Surgeon: Rupesh Norris Jr., MD;  Location: [facility];  Service: Plastics;  Laterality: Right;    CATARACT EXTRACTION Left 11/13/2022        COLON SURGERY      COLONOSCOPY N/A 03/31/2017    Procedure: COLONOSCOPY;  Surgeon: Bruno Raymond MD;  Location: [facility] ENDO (4TH FLR);  Service: Endoscopy;  Laterality: N/A;  Patient's wife requesting date.    COLONOSCOPY N/A 04/03/2018    Procedure: COLONOSCOPY;  Surgeon: Bonifacio Pelletier MD;  Location: [facility] ENDO (2ND FLR);  Service: Endoscopy;  Laterality: N/A;    COLONOSCOPY N/A 08/13/2018    Procedure: COLONOSCOPY;  Surgeon: Kam Barba MD;  Location: [facility] ENDO (2ND FLR);  Service: Endoscopy;  Laterality: N/A;  2nd floor: PA pressure 49; hx of moderate-severe valve disease     per Coumadin clinic-Patient can hold 5 days with lovenox bridge       ok to schedule per Katarina    CONTROL OF EPISTAXIS, POSTERIOR, USING NASAL PACKING OR CAUTERIZATION Bilateral 6/18/2024    Procedure: CONTROL OF EPISTAXIS, POSTERIOR, USING NASAL PACKING OR CAUTERIZATION;  Surgeon: Jono Russell MD;  Location: [facility] 2ND FLR;  Service: ENT;  Laterality: Bilateral;    CONTROL OF EPISTAXIS, POSTERIOR, USING NASAL PACKING OR CAUTERIZATION Bilateral 8/8/2024    Procedure: CONTROL OF EPISTAXIS, POSTERIOR, USING NASAL PACKING OR CAUTERIZATION;  Surgeon: Jono Russell MD;  Location: [facility] 2ND FLR;  Service: ENT;  Laterality: Bilateral;  suction bovie, nasopore, endoscopes    CORONARY ANGIOGRAPHY N/A 10/04/2021    Procedure: Left heart cath +/- peripheral angiogram;  Surgeon: Jose Ruiz MD;  Location: [facility] CATH LAB;  Service: Cardiology;  Laterality: N/A;    CORONARY ANGIOGRAPHY N/A 3/2/2023    Procedure: Angiogram, Coronary;  Surgeon: Devon Garnica MD;  Location: [facility] CATH LAB;  Service: Cardiology;  Laterality: N/A;    CORONARY ANGIOPLASTY       CORONARY ARTERY BYPASS GRAFT      CORONARY BYPASS GRAFT ANGIOGRAPHY  10/04/2021    Procedure: Bypass graft study;  Surgeon: Jose Ruiz MD;  Location: [facility] CATH LAB;  Service: Cardiology;;    CORONARY BYPASS GRAFT ANGIOGRAPHY  3/2/2023    Procedure: Bypass graft study;  Surgeon: Devon Garnica MD;  Location: [facility] CATH LAB;  Service: Cardiology;;    ECHOCARDIOGRAM,TRANSESOPHAGEAL N/A 4/14/2023    Procedure: Transesophageal echo (KIRSTEN) intra-procedure log documentation;  Surgeon: [facility] Diagnostic Provider;  Location: [facility] EP LAB;  Service: Cardiology;  Laterality: N/A;    ENDOSCOPIC NASAL CAUTERIZATION Bilateral 11/3/2023    Procedure: CAUTERIZATION, NOSE, ENDOSCOPIC;  Surgeon: Jono Russell MD;  Location: [facility] OR 2ND FLR;  Service: ENT;  Laterality: Bilateral;    ENDOSCOPIC NASAL CAUTERIZATION N/A 12/18/2023    Procedure: CAUTERIZATION, NOSE, ENDOSCOPIC;  Surgeon: Jono Russell MD;  Location: [facility] OR 2ND FLR;  Service: ENT;  Laterality: N/A;    EYE SURGERY      FESS, WITH IMAGING GUIDANCE Left 2/28/2024    Procedure: FESS, WITH IMAGING GUIDANCE;  Surgeon: Jono Russell MD;  Location: [facility] OR 2ND FLR;  Service: ENT;  Laterality: Left;  Scan loaded ENT [employer]. CL    FUNCTIONAL ENDOSCOPIC SINUS SURGERY (FESS) Bilateral 6/14/2023    Procedure: FESS (FUNCTIONAL ENDOSCOPIC SINUS SURGERY);  Surgeon: Jono Russell MD;  Location: [facility] OR 2ND FLR;  Service: ENT;  Laterality: Bilateral;    HERNIA REPAIR      INTRAOCULAR PROSTHESES INSERTION Left 11/13/2022    Procedure: INSERTION, IOL PROSTHESIS;  Surgeon: Alia Mckeon MD;  Location: [facility] OR 1ST FLR;  Service: Ophthalmology;  Laterality: Left;    INTRAOCULAR PROSTHESES INSERTION Right 12/04/2022    Procedure: INSERTION, IOL PROSTHESIS;  Surgeon: Alia Mckeon MD;  Location: [facility] OR 1ST FLR;  Service: Ophthalmology;  Laterality: Right;    LIGATION, ARTERY, ETHMOIDAL Left 2/28/2024    Procedure: LIGATION, ARTERY, ETHMOIDAL;  Surgeon: Jono Russell  MD JACQUELYN;  Location: [facility] OR [facility] FLR;  Service: ENT;  Laterality: Left;    LIGATION, ARTERY, SPHENOPALATINE, ENDOSCOPIC Bilateral 6/14/2023    Procedure: LIGATION, ARTERY, SPHENOPALATINE, ENDOSCOPIC;  Surgeon: Jono Russell MD;  [facility];  Service: ENT;  Laterality: Bilateral;    NASAL ENDOSCOPY N/A 8/8/2024    Procedure: ENDOSCOPY, NOSE;  Surgeon: Jono Russell MD;  [facility];  Service: ENT;  Laterality: N/A;    PHACOEMULSIFICATION OF CATARACT Left 11/13/2022    Procedure: PHACOEMULSIFICATION, CATARACT;  Surgeon: Alia Mckeon MD;  [facility];  Service: Ophthalmology;  Laterality: Left;    PHACOEMULSIFICATION OF CATARACT Right 12/04/2022    Procedure: PHACOEMULSIFICATION, CATARACT;  Surgeon: Alia Mckeon MD;  Location: [address];  Service: Ophthalmology;  Laterality: Right;    SPINE SURGERY      TRANSESOPHAGEAL ECHOCARDIOGRAPHY N/A 3/22/2023    Procedure: ECHOCARDIOGRAM, TRANSESOPHAGEAL;  Surgeon: [facility] Diagnostic Provider;  Location: [facility] EP LAB;  Service: Anesthesiology;  Laterality: N/A;    TRANSESOPHAGEAL ECHOCARDIOGRAPHY N/A 4/11/2023    Procedure: ECHOCARDIOGRAM, TRANSESOPHAGEAL;  Surgeon: [facility] Diagnostic Provider;  Location: [facility] EP LAB;  Service: Anesthesiology;  Laterality: N/A;    VASECTOMY         Review of patient's allergies indicates:   Allergen Reactions    Lisinopril     Losartan      Intolerance- elevates potassium level       Current Facility-Administered Medications on File Prior to Encounter   Medication    ceFAZolin 2 g in dextrose 5 % in water (D5W) 50 mL IVPB (MB+)    HYDROmorphone injection 0.2 mg    sodium chloride 0.9% flush 10 mL     Current Outpatient Medications on File Prior to Encounter   Medication Sig    acetaminophen (TYLENOL) 500 MG tablet Take 500 mg by mouth daily as needed for Pain.    allopurinoL (ZYLOPRIM) 100 MG tablet Take 1 tablet (100 mg total) by mouth once daily.    bumetanide (BUMEX) 1 MG tablet Take 2  tablets (2 mg total) by mouth every other day.    ferrous gluconate (FERGON) 324 MG tablet TAKE 1 TABLET EVERY DAY WITH BREAKFAST    isosorbide mononitrate (IMDUR) 60 MG 24 hr tablet Take 1 tablet (60 mg total) by mouth every evening.    metoprolol succinate (TOPROL-XL) 25 MG 24 hr tablet TAKE 1 TABLET ONE TIME DAILY    omega-3 fatty acids/fish oil (FISH OIL-OMEGA-3 FATTY ACIDS) 300-1,000 mg capsule Take 1 capsule by mouth once daily.    rosuvastatin (CRESTOR) 40 MG Tab TAKE 1 TABLET EVERY EVENING.    sodium chloride (SALINE NASAL) 0.65 % nasal spray Use 2 sprays by Nasal route every 4 hours. Apply into nasal cavities daily with nightly application of vaseline/aquaphor to anterior nares. Keep head of bed elevated.    traZODone (DESYREL) 50 MG tablet Take 1 tablet (50 mg total) by mouth every evening.    warfarin (COUMADIN) 5 MG tablet TAKE 1/2 TABLET (2.5MG) SATURDAY, THEN TAKE 1 TABLET (5MG) ALL OTHER DAYS OR AS INSTRUCTED BY COUMADIN CLINIC (Patient taking differently: Take 5 mg by mouth every evening.)     Family History       Problem Relation (Age of Onset)    Alcohol abuse Father    Diabetes Brother    Heart disease Mother, Father, Brother, Sister    Heart failure Mother, Father, Brother    No Known Problems Sister, Maternal Grandmother, Maternal Grandfather, Paternal Grandmother, Paternal Grandfather, Maternal Aunt, Maternal Uncle, Paternal Aunt, Paternal Uncle          Tobacco Use    Smoking status: Former     Current packs/day: 0.00     Average packs/day: 1 pack/day for 20.0 years (20.0 ttl pk-yrs)     Types: Cigarettes     Start date: 1960     Quit date: 1980     Years since quittin.0     Passive exposure: Never    Smokeless tobacco: Never   Substance and Sexual Activity    Alcohol use: No    Drug use: No    Sexual activity: Not Currently     Partners: Female     Review of Systems   Constitutional:  Negative for chills and fever.   HENT:  Positive for nosebleeds. Negative for congestion,  postnasal drip, rhinorrhea, sinus pain, sore throat and trouble swallowing.    Respiratory:  Positive for cough and shortness of breath. Negative for wheezing and stridor.    Cardiovascular:  Positive for chest pain. Negative for palpitations and leg swelling.   Gastrointestinal:  Negative for abdominal distention, abdominal pain, constipation, diarrhea, nausea and vomiting.   Genitourinary:  Negative for dysuria.     Objective:     Vital Signs (Most Recent):  Temp: 97.7 °F (36.5 °C) (09/26/24 1812)  Pulse: 80 (09/26/24 1812)  Resp: 18 (09/26/24 1812)  BP: (!) 132/48 (09/26/24 1812)  SpO2: (!) 94 % (09/26/24 1812) Vital Signs (24h Range):  Temp:  [97.7 °F (36.5 °C)-98 °F (36.7 °C)] 97.7 °F (36.5 °C)  Pulse:  [51-80] 80  Resp:  [16-25] 18  SpO2:  [94 %-97 %] 94 %  BP: (132-145)/() 132/48     Weight: 68.9 kg (152 lb)  Body mass index is 25.29 kg/m².     Physical Exam  Constitutional:       General: He is not in acute distress.     Appearance: Normal appearance.   HENT:      Head: Normocephalic and atraumatic.   Cardiovascular:      Rate and Rhythm: Regular rhythm. Bradycardia present.      Pulses: Normal pulses.      Heart sounds: Murmur heard.      No friction rub. No gallop.   Pulmonary:      Effort: Pulmonary effort is normal. No respiratory distress.      Breath sounds: Normal breath sounds. No stridor. No wheezing, rhonchi or rales.   Chest:      Chest wall: No tenderness.   Abdominal:      General: Abdomen is flat. Bowel sounds are normal. There is no distension.      Palpations: Abdomen is soft. There is no mass.      Tenderness: There is no abdominal tenderness. There is no guarding or rebound.      Hernia: No hernia is present.   Musculoskeletal:      Right lower leg: No edema.      Left lower leg: No edema.   Skin:     General: Skin is warm and dry.      Capillary Refill: Capillary refill takes less than 2 seconds.      Coloration: Skin is not jaundiced.      Findings: No erythema or rash.    Neurological:      Mental Status: He is alert. Mental status is at baseline.                Significant Labs: All pertinent labs within the past 24 hours have been reviewed.  Recent Lab Results         09/26/24  1708   09/26/24  1551   09/26/24  1527        Albumin   3.2         ALP   79         ALT   21         Anion Gap   8         AST   24         Baso #   0.03         Basophil %   0.8         BILIRUBIN TOTAL   0.3  Comment: For infants and newborns, interpretation of results should be based  on gestational age, weight and in agreement with clinical  observations.    Premature Infant recommended reference ranges:  Up to 24 hours.............<8.0 mg/dL  Up to 48 hours............<12.0 mg/dL  3-5 days..................<15.0 mg/dL  6-29 days.................<15.0 mg/dL           BNP   1,357  Comment: Values of less than 100 pg/ml are consistent with non-CHF populations.         BUN   61         Calcium   10.3         Chloride   109         CO2   22         Creatinine   2.6         Differential Method   Automated         eGFR   23.7         Eos #   0.1         Eos %   2.5         Glucose   130         Gran # (ANC)   2.6         Gran %   63.8         Group & Rh   O NEG         Hematocrit   20.0         Hemoglobin   6.4         Immature Grans (Abs)   0.01  Comment: Mild elevation in immature granulocytes is non specific and   can be seen in a variety of conditions including stress response,   acute inflammation, trauma and pregnancy. Correlation with other   laboratory and clinical findings is essential.           Immature Granulocytes   0.3         INDIRECT CRISTIANO   NEG         INR 2.6  Comment: Coumadin Therapy:  2.0 - 3.0 for INR for all indicators except mechanical heart valves  and antiphospholipid syndromes which should use 2.5 - 3.5.             Lymph #   0.9         Lymph %   21.8         MCH   31.2         MCHC   32.0         MCV   98         Mono #   0.4         Mono %   10.8         MPV   10.5         nRBC    0         QRS Duration     158       OHS QTC Calculation     488       Platelet Count   185         Potassium   4.9         PROTEIN TOTAL   6.8         PT 26.5           RBC   2.05         RDW   15.0         Sodium   139         Specimen Outdate   09/29/2024 23:59         Troponin I   0.041  Comment: The reference interval for Troponin I represents the 99th percentile   cutoff   for our facility and is consistent with 3rd generation assay   performance.           WBC   4.00                 Significant Imaging:   X-Ray Chest AP Portable   Final Result      Cardiomegaly.  No evidence of pulmonary edema.         Electronically signed by: Lew Gaytan MD   Date:    09/26/2024   Time:    16:56

## 2024-09-26 NOTE — TELEPHONE ENCOUNTER
----- Message from Mary Noyola sent at 9/26/2024  2:13 PM CDT -----  Regarding: Pt advice  Contact: [phone]  Good afternoon the pt wife is following on call from Dr. Russell today.  Please have provider or nurse call and discuss with patient wife thank you.

## 2024-09-27 ENCOUNTER — EPISODE CHANGES (OUTPATIENT)
Dept: CARDIOLOGY | Facility: CLINIC | Age: 84
End: 2024-09-27

## 2024-09-27 ENCOUNTER — TELEPHONE (OUTPATIENT)
Dept: INTERNAL MEDICINE | Facility: CLINIC | Age: 84
End: 2024-09-27
Payer: MEDICARE

## 2024-09-27 LAB
ALBUMIN SERPL BCP-MCNC: 3.1 G/DL (ref 3.5–5.2)
ALP SERPL-CCNC: 75 U/L (ref 55–135)
ALT SERPL W/O P-5'-P-CCNC: 18 U/L (ref 10–44)
ANION GAP SERPL CALC-SCNC: 9 MMOL/L (ref 8–16)
ASCENDING AORTA: 3.05 CM
AST SERPL-CCNC: 25 U/L (ref 10–40)
AV AREA BY CONTINUOUS VTI: 1.3 CM2
AV INDEX (PROSTH): 0.5
AV LVOT MEAN GRADIENT: 8 MMHG
AV LVOT PEAK GRADIENT: 12 MMHG
AV MEAN GRADIENT: 20 MMHG
AV PEAK GRADIENT: 32 MMHG
AV VALVE AREA BY VELOCITY RATIO: 1.56 CM²
AV VALVE AREA: 1.3 CM2
AV VELOCITY RATIO: 0.6
BASOPHILS # BLD AUTO: 0.04 K/UL (ref 0–0.2)
BASOPHILS NFR BLD: 0.4 % (ref 0–1.9)
BILIRUB SERPL-MCNC: 1.7 MG/DL (ref 0.1–1)
BLD PROD TYP BPU: NORMAL
BLOOD UNIT EXPIRATION DATE: NORMAL
BLOOD UNIT TYPE CODE: 9500
BLOOD UNIT TYPE: NORMAL
BSA FOR ECHO PROCEDURE: 1.78 M2
BUN SERPL-MCNC: 56 MG/DL (ref 8–23)
CALCIUM SERPL-MCNC: 9.9 MG/DL (ref 8.7–10.5)
CHLORIDE SERPL-SCNC: 109 MMOL/L (ref 95–110)
CO2 SERPL-SCNC: 21 MMOL/L (ref 23–29)
CODING SYSTEM: NORMAL
CREAT SERPL-MCNC: 2.4 MG/DL (ref 0.5–1.4)
CROSSMATCH INTERPRETATION: NORMAL
CV ECHO LV RWT: 0.48 CM
DIFFERENTIAL METHOD BLD: ABNORMAL
DISPENSE STATUS: NORMAL
DOP CALC AO PEAK VEL: 2.84 M/S
DOP CALC AO VTI: 105.67 CM
DOP CALC LVOT AREA: 2.6 CM2
DOP CALC LVOT DIAMETER: 1.82 CM
DOP CALC LVOT PEAK VEL: 1.7 M/S
DOP CALC LVOT STROKE VOLUME: 137.45 CM3
DOP CALCLVOT PEAK VEL VTI: 52.86 CM
E WAVE DECELERATION TIME: 266.02 MS
E/A RATIO: 2.4
E/E' RATIO: 15.07 M/S
ECHO EF ESTIMATED: 30 %
ECHO LV POSTERIOR WALL: 1.07 CM (ref 0.6–1.1)
EJECTION FRACTION: 43 %
EOSINOPHIL # BLD AUTO: 0.1 K/UL (ref 0–0.5)
EOSINOPHIL NFR BLD: 1.6 % (ref 0–8)
ERYTHROCYTE [DISTWIDTH] IN BLOOD BY AUTOMATED COUNT: 15.3 % (ref 11.5–14.5)
ERYTHROCYTE [DISTWIDTH] IN BLOOD BY AUTOMATED COUNT: 15.9 % (ref 11.5–14.5)
EST. GFR  (NO RACE VARIABLE): 26.1 ML/MIN/1.73 M^2
FRACTIONAL SHORTENING: 14 % (ref 28–44)
GLUCOSE SERPL-MCNC: 79 MG/DL (ref 70–110)
HCT VFR BLD AUTO: 25.4 % (ref 40–54)
HCT VFR BLD AUTO: 25.7 % (ref 40–54)
HGB BLD-MCNC: 8 G/DL (ref 14–18)
HGB BLD-MCNC: 8.1 G/DL (ref 14–18)
IMM GRANULOCYTES # BLD AUTO: 0.05 K/UL (ref 0–0.04)
IMM GRANULOCYTES NFR BLD AUTO: 0.6 % (ref 0–0.5)
INR PPP: 2.2 (ref 0.8–1.2)
INTERVENTRICULAR SEPTUM: 1.14 CM (ref 0.6–1.1)
IVC DIAMETER: 2.21 CM
LA MAJOR: 7.15 CM
LA MINOR: 7.16 CM
LA WIDTH: 7.06 CM
LEFT ATRIUM SIZE: 5.69 CM
LEFT ATRIUM VOLUME INDEX MOD: 101.6 ML/M2
LEFT ATRIUM VOLUME INDEX: 138.8 ML/M2
LEFT ATRIUM VOLUME MOD: 178.84 ML
LEFT ATRIUM VOLUME: 244.31 CM3
LEFT INTERNAL DIMENSION IN SYSTOLE: 3.85 CM (ref 2.1–4)
LEFT VENTRICLE DIASTOLIC VOLUME INDEX: 51.72 ML/M2
LEFT VENTRICLE DIASTOLIC VOLUME: 91.02 ML
LEFT VENTRICLE MASS INDEX: 99 G/M2
LEFT VENTRICLE SYSTOLIC VOLUME INDEX: 36.3 ML/M2
LEFT VENTRICLE SYSTOLIC VOLUME: 63.88 ML
LEFT VENTRICULAR INTERNAL DIMENSION IN DIASTOLE: 4.47 CM (ref 3.5–6)
LEFT VENTRICULAR MASS: 174.29 G
LV LATERAL E/E' RATIO: 12.56
LV SEPTAL E/E' RATIO: 18.83
LYMPHOCYTES # BLD AUTO: 0.9 K/UL (ref 1–4.8)
LYMPHOCYTES NFR BLD: 10.5 % (ref 18–48)
MAGNESIUM SERPL-MCNC: 2.1 MG/DL (ref 1.6–2.6)
MCH RBC QN AUTO: 30 PG (ref 27–31)
MCH RBC QN AUTO: 30.6 PG (ref 27–31)
MCHC RBC AUTO-ENTMCNC: 31.1 G/DL (ref 32–36)
MCHC RBC AUTO-ENTMCNC: 31.9 G/DL (ref 32–36)
MCV RBC AUTO: 96 FL (ref 82–98)
MCV RBC AUTO: 96 FL (ref 82–98)
MONOCYTES # BLD AUTO: 0.7 K/UL (ref 0.3–1)
MONOCYTES NFR BLD: 7.5 % (ref 4–15)
MV PEAK A VEL: 0.47 M/S
MV PEAK E VEL: 1.13 M/S
NEUTROPHILS # BLD AUTO: 7.1 K/UL (ref 1.8–7.7)
NEUTROPHILS NFR BLD: 79.4 % (ref 38–73)
NRBC BLD-RTO: 0 /100 WBC
OHS QRS DURATION: 158 MS
OHS QRS DURATION: 164 MS
OHS QTC CALCULATION: 451 MS
OHS QTC CALCULATION: 483 MS
PHOSPHATE SERPL-MCNC: 4.1 MG/DL (ref 2.7–4.5)
PISA TR MAX VEL: 4.2 M/S
PLATELET # BLD AUTO: 180 K/UL (ref 150–450)
PLATELET # BLD AUTO: 184 K/UL (ref 150–450)
PMV BLD AUTO: 10.3 FL (ref 9.2–12.9)
PMV BLD AUTO: 10.3 FL (ref 9.2–12.9)
POCT GLUCOSE: 114 MG/DL (ref 70–110)
POCT GLUCOSE: 126 MG/DL (ref 70–110)
POCT GLUCOSE: 134 MG/DL (ref 70–110)
POCT GLUCOSE: 85 MG/DL (ref 70–110)
POCT GLUCOSE: 89 MG/DL (ref 70–110)
POTASSIUM SERPL-SCNC: 4.7 MMOL/L (ref 3.5–5.1)
PROT SERPL-MCNC: 6.6 G/DL (ref 6–8.4)
PROTHROMBIN TIME: 22.6 SEC (ref 9–12.5)
RA MAJOR: 6.58 CM
RA PRESSURE ESTIMATED: 8 MMHG
RA WIDTH: 4.05 CM
RBC # BLD AUTO: 2.65 M/UL (ref 4.6–6.2)
RBC # BLD AUTO: 2.67 M/UL (ref 4.6–6.2)
RIGHT ATRIAL AREA: 26.2 CM2
RIGHT VENTRICLE DIASTOLIC BASEL DIMENSION: 4.7 CM
RV TB RVSP: 12 MMHG
RV TISSUE DOPPLER FREE WALL SYSTOLIC VELOCITY 1 (APICAL 4 CHAMBER VIEW): 7.35 CM/S
SINUS: 3.29 CM
SODIUM SERPL-SCNC: 139 MMOL/L (ref 136–145)
STJ: 2.36 CM
TDI LATERAL: 0.09 M/S
TDI SEPTAL: 0.06 M/S
TDI: 0.08 M/S
TR MAX PG: 71 MMHG
TRANS ERYTHROCYTES VOL PATIENT: NORMAL ML
TRICUSPID ANNULAR PLANE SYSTOLIC EXCURSION: 1.88 CM
TV PEAK GRADIENT: 70 MMHG
TV REST PULMONARY ARTERY PRESSURE: 79 MMHG
WBC # BLD AUTO: 5.46 K/UL (ref 3.9–12.7)
WBC # BLD AUTO: 8.93 K/UL (ref 3.9–12.7)
Z-SCORE OF LEFT VENTRICULAR DIMENSION IN END DIASTOLE: -0.85
Z-SCORE OF LEFT VENTRICULAR DIMENSION IN END SYSTOLE: 1.95

## 2024-09-27 PROCEDURE — 36415 COLL VENOUS BLD VENIPUNCTURE: CPT | Mod: HCNC

## 2024-09-27 PROCEDURE — 84100 ASSAY OF PHOSPHORUS: CPT | Mod: HCNC

## 2024-09-27 PROCEDURE — 20600001 HC STEP DOWN PRIVATE ROOM: Mod: HCNC

## 2024-09-27 PROCEDURE — 86920 COMPATIBILITY TEST SPIN: CPT | Mod: HCNC

## 2024-09-27 PROCEDURE — 25500020 PHARM REV CODE 255: Mod: HCNC

## 2024-09-27 PROCEDURE — 85610 PROTHROMBIN TIME: CPT | Mod: HCNC

## 2024-09-27 PROCEDURE — 09CK8ZZ EXTIRPATION OF MATTER FROM NASAL MUCOSA AND SOFT TISSUE, VIA NATURAL OR ARTIFICIAL OPENING ENDOSCOPIC: ICD-10-PCS | Performed by: STUDENT IN AN ORGANIZED HEALTH CARE EDUCATION/TRAINING PROGRAM

## 2024-09-27 PROCEDURE — P9021 RED BLOOD CELLS UNIT: HCPCS | Mod: HCNC

## 2024-09-27 PROCEDURE — 85025 COMPLETE CBC W/AUTO DIFF WBC: CPT | Mod: HCNC

## 2024-09-27 PROCEDURE — 83735 ASSAY OF MAGNESIUM: CPT | Mod: HCNC

## 2024-09-27 PROCEDURE — 80053 COMPREHEN METABOLIC PANEL: CPT | Mod: HCNC

## 2024-09-27 PROCEDURE — 85027 COMPLETE CBC AUTOMATED: CPT | Mod: HCNC

## 2024-09-27 PROCEDURE — 2Y41X5Z PACKING OF NASAL REGION USING PACKING MATERIAL: ICD-10-PCS | Performed by: STUDENT IN AN ORGANIZED HEALTH CARE EDUCATION/TRAINING PROGRAM

## 2024-09-27 PROCEDURE — 96376 TX/PRO/DX INJ SAME DRUG ADON: CPT

## 2024-09-27 PROCEDURE — 63600175 PHARM REV CODE 636 W HCPCS: Mod: HCNC

## 2024-09-27 PROCEDURE — 25000003 PHARM REV CODE 250: Mod: HCNC

## 2024-09-27 RX ORDER — BACITRACIN ZINC 500 [USP'U]/G
OINTMENT TOPICAL 2 TIMES DAILY
Status: DISCONTINUED | OUTPATIENT
Start: 2024-09-27 | End: 2024-09-27

## 2024-09-27 RX ORDER — WARFARIN SODIUM 5 MG/1
5 TABLET ORAL
Status: DISCONTINUED | OUTPATIENT
Start: 2024-09-28 | End: 2024-09-28 | Stop reason: HOSPADM

## 2024-09-27 RX ORDER — OXYMETAZOLINE HCL 0.05 %
2 SPRAY, NON-AEROSOL (ML) NASAL 2 TIMES DAILY
Status: DISCONTINUED | OUTPATIENT
Start: 2024-09-27 | End: 2024-09-27

## 2024-09-27 RX ORDER — WARFARIN 2.5 MG/1
2.5 TABLET ORAL
Status: DISCONTINUED | OUTPATIENT
Start: 2024-09-27 | End: 2024-09-28 | Stop reason: HOSPADM

## 2024-09-27 RX ORDER — OXYMETAZOLINE HCL 0.05 %
2 SPRAY, NON-AEROSOL (ML) NASAL
COMMUNITY

## 2024-09-27 RX ORDER — HYDROCODONE BITARTRATE AND ACETAMINOPHEN 500; 5 MG/1; MG/1
TABLET ORAL
Status: DISCONTINUED | OUTPATIENT
Start: 2024-09-27 | End: 2024-09-27

## 2024-09-27 RX ORDER — OXYMETAZOLINE HCL 0.05 %
2 SPRAY, NON-AEROSOL (ML) NASAL 2 TIMES DAILY
Status: DISCONTINUED | OUTPATIENT
Start: 2024-09-27 | End: 2024-09-28 | Stop reason: HOSPADM

## 2024-09-27 RX ADMIN — HUMAN ALBUMIN MICROSPHERES AND PERFLUTREN 0.11 MG: 10; .22 INJECTION, SOLUTION INTRAVENOUS at 09:09

## 2024-09-27 RX ADMIN — NASAL 2 SPRAY: 6.5 SPRAY NASAL at 08:09

## 2024-09-27 RX ADMIN — Medication 324 MG: at 08:09

## 2024-09-27 RX ADMIN — ISOSORBIDE MONONITRATE 60 MG: 60 TABLET, EXTENDED RELEASE ORAL at 09:09

## 2024-09-27 RX ADMIN — WARFARIN SODIUM 2.5 MG: 2.5 TABLET ORAL at 05:09

## 2024-09-27 RX ADMIN — ALLOPURINOL 100 MG: 300 TABLET ORAL at 08:09

## 2024-09-27 RX ADMIN — FUROSEMIDE 40 MG: 10 INJECTION, SOLUTION INTRAVENOUS at 08:09

## 2024-09-27 RX ADMIN — BACITRACIN: 500 OINTMENT TOPICAL at 06:09

## 2024-09-27 RX ADMIN — ATORVASTATIN CALCIUM 80 MG: 20 TABLET, FILM COATED ORAL at 08:09

## 2024-09-27 NOTE — H&P
[facility] - Emergency Dept  [facility] Medicine  History & Physical    Patient Name: Dariel Urbina  MRN: [id number]  Patient Class: OP- Observation  Admission Date: 9/26/2024  Attending Physician: Inga Muir*   Primary Care Provider: Devon Langston Jr., MD         Patient information was obtained from patient, spouse/SO, and ER records.     Subjective:     Principal Problem:Anemia    Chief Complaint:   Chief Complaint   Patient presents with    Chest Pain     Chest pain since yesterday, had blood work done on 25th and Hgb was 6.5 so was told to come to ER for blood transfusion         HPI: Dariel Urbina is a 83 year old male with a PMHx of CHF, CKD stage 4, CAD s/p CABG (1990s), mechanical AVR (on warfarin), moderate-severe MR, recurrent epistaxis, anemia, NSTEMI, PVD, recurrent epistaxis and type 2 diabetes presenting to the ED for low hgb and chest pain.     Patient has been having recurrent episodes of epistaxis for the past few months and has been followed by ENT. He previously had undergone bilateral SP ligation followed by bilateral embolization, cautery on the left septum for persistent bleeding on 11/03/2023 and again recently on 12/18/23. He recently underwent left AEA ligation on 2/28/24 and recently underwent nasal endoscopy and control of epistaxis on 6/18/24. He then had repeat embolization on 08/06/24. These procedures have not fully controlled epistaxis. Patient also on coumadin for an aortic valve replacement for which he was undergoing H/H. It was found that his Hgb was 6.5. Patient was called and recommended to come into the ED for further evaluation and pRBC.     Patient has noted increased dizziness and lightheadedness for the past few days worse than baseline. It has not affected his ADLs however he noted that he has had to be more careful performing daily tasks. Patient also with complaints of chest pain and shortness of breath. He states that his chest pain typically occurs on  exertion after waling appx 10-20 steps after resting. These symptoms go away at rest. Patient with hx CAD on IMDUR at home which he mentions helps with his chest pain, however he has noticed his shortness of breath has been mildly worsened.    In the ED, VSS. Labs significant for Hgb 6.4, Cr 2.6 (BL 1.7-2.2, consistently elevated on previous admissions), BNP 1357, troponin 0.041. CXR without evidence of pulmonary edema. Patient given 1 U pRBC and 40 mg IV lasix and admitted to hospital medicine for further workup and evaluation      Past Medical History:   Diagnosis Date    Anemia of chronic renal failure, stage 3 (moderate) 05/27/2015    Anticoagulant long-term use     Atherosclerosis of coronary artery bypass graft of native heart without angina pectoris 09/11/2012    3-27-18 [facility] Two vessel coronary artery disease.   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    Bilateral carotid artery disease 02/09/2017    Bleeding from the nose     Bleeding nose 03/21/2018    Cancer     Cataract     CHF (congestive heart failure)     CKD (chronic kidney disease) stage 3, GFR 30-59 ml/min 05/27/2015    Claudication of left lower extremity 09/17/2014    Colon polyp     Encounter for blood transfusion     Gastroesophageal reflux disease without esophagitis 03/19/2018    Gastrointestinal hemorrhage associated with intestinal diverticulosis 04/01/2018    Glaucoma     H/O mechanical aortic valve replacement 09/17/2014    History of gout 09/26/2012    Hyperparathyroidism due to renal insufficiency 07/27/2015    Internal hemorrhoid 04/03/2018    Long term current use of anticoagulant therapy 09/26/2012    Mechanical heart valve present     Metabolic acidosis with normal anion gap and bicarbonate losses 03/20/2018    Mixed hyperlipidemia 09/26/2012    NSTEMI (non-ST elevated myocardial infarction) 03/21/2018    Obesity, diabetes, and hypertension syndrome 02/23/2016    Paroxysmal atrial fibrillation 02/06/2023     PVD (peripheral vascular disease) 09/11/2012    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       Past Surgical History:   Procedure Laterality Date    BACK SURGERY      CARDIAC CATHETERIZATION      CARDIAC VALVE REPLACEMENT      CARDIAC VALVE SURGERY      CARPAL TUNNEL RELEASE Right 05/19/2020    Procedure: RELEASE, CARPAL TUNNEL;  Surgeon: Rupesh Norris Jr., MD;  Location: [facility];  Service: Plastics;  Laterality: Right;    CATARACT EXTRACTION Left 11/13/2022        COLON SURGERY      COLONOSCOPY N/A 03/31/2017    Procedure: COLONOSCOPY;  Surgeon: Bruno Raymond MD;  Location: [facility] ENDO (4TH FLR);  Service: Endoscopy;  Laterality: N/A;  Patient's wife requesting date.    COLONOSCOPY N/A 04/03/2018    Procedure: COLONOSCOPY;  Surgeon: Bonifacio Pelletier MD;  Location: [facility] ENDO (2ND FLR);  Service: Endoscopy;  Laterality: N/A;    COLONOSCOPY N/A 08/13/2018    Procedure: COLONOSCOPY;  Surgeon: Kam Barba MD;  Location: [facility] ENDO (2ND FLR);  Service: Endoscopy;  Laterality: N/A;  2nd floor: PA pressure 49; hx of moderate-severe valve disease     per Coumadin clinic-Patient can hold 5 days with lovenox bridge       ok to schedule per Katarina    CONTROL OF EPISTAXIS, POSTERIOR, USING NASAL PACKING OR CAUTERIZATION Bilateral 6/18/2024    Procedure: CONTROL OF EPISTAXIS, POSTERIOR, USING NASAL PACKING OR CAUTERIZATION;  Surgeon: Jono Russell MD;  Location: [facility] OR 2ND FLR;  Service: ENT;  Laterality: Bilateral;    CONTROL OF EPISTAXIS, POSTERIOR, USING NASAL PACKING OR CAUTERIZATION Bilateral 8/8/2024    Procedure: CONTROL OF EPISTAXIS, POSTERIOR, USING NASAL PACKING OR  CAUTERIZATION;  Surgeon: Jono Russell MD;  Location: [facility] OR 2ND FLR;  Service: ENT;  Laterality: Bilateral;  suction bovie, nasopore, endoscopes    CORONARY ANGIOGRAPHY N/A 10/04/2021    Procedure: Left heart cath +/- peripheral angiogram;  Surgeon: Jose Ruiz MD;  Location: [facility] CATH LAB;  Service: Cardiology;  Laterality: N/A;    CORONARY ANGIOGRAPHY N/A 3/2/2023    Procedure: Angiogram, Coronary;  Surgeon: Devon Garnica MD;  Location: [facility] CATH LAB;  Service: Cardiology;  Laterality: N/A;    CORONARY ANGIOPLASTY      CORONARY ARTERY BYPASS GRAFT      CORONARY BYPASS GRAFT ANGIOGRAPHY  10/04/2021    Procedure: Bypass graft study;  Surgeon: Jose Ruiz MD;  Location: [facility] CATH LAB;  Service: Cardiology;;    CORONARY BYPASS GRAFT ANGIOGRAPHY  3/2/2023    Procedure: Bypass graft study;  Surgeon: Devon Garnica MD;  Location: [facility] CATH LAB;  Service: Cardiology;;    ECHOCARDIOGRAM,TRANSESOPHAGEAL N/A 4/14/2023    Procedure: Transesophageal echo (KIRSTEN) intra-procedure log documentation;  Surgeon: [facility] Diagnostic Provider;  Location: [facility] EP LAB;  Service: Cardiology;  Laterality: N/A;    ENDOSCOPIC NASAL CAUTERIZATION Bilateral 11/3/2023    Procedure: CAUTERIZATION, NOSE, ENDOSCOPIC;  Surgeon: Jono Russell MD;  Location: [facility] OR 2ND FLR;  Service: ENT;  Laterality: Bilateral;    ENDOSCOPIC NASAL CAUTERIZATION N/A 12/18/2023    Procedure: CAUTERIZATION, NOSE, ENDOSCOPIC;  Surgeon: Jono Russell MD;  Location: [facility] OR 2ND FLR;  Service: ENT;  Laterality: N/A;    EYE SURGERY      FESS, WITH IMAGING GUIDANCE Left 2/28/2024    Procedure: FESS, WITH IMAGING GUIDANCE;  Surgeon: Jono Russell MD;  Location: [facility] OR 2ND FLR;  Service: ENT;  Laterality: Left;  Scan loaded ENT IO [employer]. CL    FUNCTIONAL ENDOSCOPIC SINUS SURGERY (FESS) Bilateral 6/14/2023    Procedure: FESS (FUNCTIONAL ENDOSCOPIC SINUS SURGERY);  Surgeon: Jono Russell MD;  Location: [facility] OR [facility] FLR;  Service: ENT;  Laterality:  Bilateral;    HERNIA REPAIR      INTRAOCULAR PROSTHESES INSERTION Left 11/13/2022    Procedure: INSERTION, IOL PROSTHESIS;  Surgeon: Alia Mckeon MD;  Location: [facility] OR 1ST FLR;  Service: Ophthalmology;  Laterality: Left;    INTRAOCULAR PROSTHESES INSERTION Right 12/04/2022    Procedure: INSERTION, IOL PROSTHESIS;  Surgeon: Alia Mckeon MD;  Location: [facility] OR 1ST FLR;  Service: Ophthalmology;  Laterality: Right;    LIGATION, ARTERY, ETHMOIDAL Left 2/28/2024    Procedure: LIGATION, ARTERY, ETHMOIDAL;  Surgeon: Jono Russell MD;  Location: [facility] OR 2ND FLR;  Service: ENT;  Laterality: Left;    LIGATION, ARTERY, SPHENOPALATINE, ENDOSCOPIC Bilateral 6/14/2023    Procedure: LIGATION, ARTERY, SPHENOPALATINE, ENDOSCOPIC;  Surgeon: Jono Russell MD;  Location: [facility] OR 2ND FLR;  Service: ENT;  Laterality: Bilateral;    NASAL ENDOSCOPY N/A 8/8/2024    Procedure: ENDOSCOPY, NOSE;  Surgeon: Jono Russell MD;  Location: [facility] OR 2ND FLR;  Service: ENT;  Laterality: N/A;    PHACOEMULSIFICATION OF CATARACT Left 11/13/2022    Procedure: PHACOEMULSIFICATION, CATARACT;  Surgeon: Alia Mckeon MD;  Location: [facility] OR 1ST FLR;  Service: Ophthalmology;  Laterality: Left;    PHACOEMULSIFICATION OF CATARACT Right 12/04/2022    Procedure: PHACOEMULSIFICATION, CATARACT;  Surgeon: Alia Mckeon MD;  Location: [facility] OR 1ST FLR;  Service: Ophthalmology;  Laterality: Right;    SPINE SURGERY      TRANSESOPHAGEAL ECHOCARDIOGRAPHY N/A 3/22/2023    Procedure: ECHOCARDIOGRAM, TRANSESOPHAGEAL;  Surgeon: [facility] Diagnostic Provider;  Location: [facility] EP LAB;  Service: Anesthesiology;  Laterality: N/A;    TRANSESOPHAGEAL ECHOCARDIOGRAPHY N/A 4/11/2023    Procedure: ECHOCARDIOGRAM, TRANSESOPHAGEAL;  Surgeon: [facility] Diagnostic Provider;  Location: [facility] EP LAB;  Service: Anesthesiology;  Laterality: N/A;    VASECTOMY         Review of patient's allergies indicates:   Allergen Reactions    Lisinopril     Losartan      Intolerance-  elevates potassium level       Current Facility-Administered Medications on File Prior to Encounter   Medication    ceFAZolin 2 g in dextrose 5 % in water (D5W) 50 mL IVPB (MB+)    HYDROmorphone injection 0.2 mg    sodium chloride 0.9% flush 10 mL     Current Outpatient Medications on File Prior to Encounter   Medication Sig    acetaminophen (TYLENOL) 500 MG tablet Take 500 mg by mouth daily as needed for Pain.    allopurinoL (ZYLOPRIM) 100 MG tablet Take 1 tablet (100 mg total) by mouth once daily.    bumetanide (BUMEX) 1 MG tablet Take 2 tablets (2 mg total) by mouth every other day.    ferrous gluconate (FERGON) 324 MG tablet TAKE 1 TABLET EVERY DAY WITH BREAKFAST    isosorbide mononitrate (IMDUR) 60 MG 24 hr tablet Take 1 tablet (60 mg total) by mouth every evening.    metoprolol succinate (TOPROL-XL) 25 MG 24 hr tablet TAKE 1 TABLET ONE TIME DAILY    omega-3 fatty acids/fish oil (FISH OIL-OMEGA-3 FATTY ACIDS) 300-1,000 mg capsule Take 1 capsule by mouth once daily.    rosuvastatin (CRESTOR) 40 MG Tab TAKE 1 TABLET EVERY EVENING.    sodium chloride (SALINE NASAL) 0.65 % nasal spray Use 2 sprays by Nasal route every 4 hours. Apply into nasal cavities daily with nightly application of vaseline/aquaphor to anterior nares. Keep head of bed elevated.    traZODone (DESYREL) 50 MG tablet Take 1 tablet (50 mg total) by mouth every evening.    warfarin (COUMADIN) 5 MG tablet TAKE 1/2 TABLET (2.5MG) SATURDAY, THEN TAKE 1 TABLET (5MG) ALL OTHER DAYS OR AS INSTRUCTED BY COUMADIN CLINIC (Patient taking differently: Take 5 mg by mouth every evening.)     Family History       Problem Relation (Age of Onset)    Alcohol abuse Father    Diabetes Brother    Heart disease Mother, Father, Brother, Sister    Heart failure Mother, Father, Brother    No Known Problems Sister, Maternal Grandmother, Maternal Grandfather, Paternal Grandmother, Paternal Grandfather, Maternal Aunt, Maternal Uncle, Paternal Aunt, Paternal Uncle           Tobacco Use    Smoking status: Former     Current packs/day: 0.00     Average packs/day: 1 pack/day for 20.0 years (20.0 ttl pk-yrs)     Types: Cigarettes     Start date: 1960     Quit date: 1980     Years since quittin.0     Passive exposure: Never    Smokeless tobacco: Never   Substance and Sexual Activity    Alcohol use: No    Drug use: No    Sexual activity: Not Currently     Partners: Female     Review of Systems   Constitutional:  Negative for chills and fever.   HENT:  Positive for nosebleeds. Negative for congestion, postnasal drip, rhinorrhea, sinus pain, sore throat and trouble swallowing.    Respiratory:  Positive for cough and shortness of breath. Negative for wheezing and stridor.    Cardiovascular:  Positive for chest pain. Negative for palpitations and leg swelling.   Gastrointestinal:  Negative for abdominal distention, abdominal pain, constipation, diarrhea, nausea and vomiting.   Genitourinary:  Negative for dysuria.     Objective:     Vital Signs (Most Recent):  Temp: 97.7 °F (36.5 °C) (24)  Pulse: 80 (24)  Resp: 18 (24)  BP: (!) 132/48 (24)  SpO2: (!) 94 % (24) Vital Signs (24h Range):  Temp:  [97.7 °F (36.5 °C)-98 °F (36.7 °C)] 97.7 °F (36.5 °C)  Pulse:  [51-80] 80  Resp:  [16-25] 18  SpO2:  [94 %-97 %] 94 %  BP: (132-145)/() 132/48     Weight: 68.9 kg (152 lb)  Body mass index is 25.29 kg/m².     Physical Exam  Constitutional:       General: He is not in acute distress.     Appearance: Normal appearance.   HENT:      Head: Normocephalic and atraumatic.   Cardiovascular:      Rate and Rhythm: Regular rhythm. Bradycardia present.      Pulses: Normal pulses.      Heart sounds: Murmur heard.      No friction rub. No gallop.   Pulmonary:      Effort: Pulmonary effort is normal. No respiratory distress.      Breath sounds: Normal breath sounds. No stridor. No wheezing, rhonchi or rales.   Chest:      Chest wall: No  tenderness.   Abdominal:      General: Abdomen is flat. Bowel sounds are normal. There is no distension.      Palpations: Abdomen is soft. There is no mass.      Tenderness: There is no abdominal tenderness. There is no guarding or rebound.      Hernia: No hernia is present.   Musculoskeletal:      Right lower leg: No edema.      Left lower leg: No edema.   Skin:     General: Skin is warm and dry.      Capillary Refill: Capillary refill takes less than 2 seconds.      Coloration: Skin is not jaundiced.      Findings: No erythema or rash.   Neurological:      Mental Status: He is alert. Mental status is at baseline.                Significant Labs: All pertinent labs within the past 24 hours have been reviewed.  Recent Lab Results         09/26/24  1708   09/26/24  1551   09/26/24  1527        Albumin   3.2         ALP   79         ALT   21         Anion Gap   8         AST   24         Baso #   0.03         Basophil %   0.8         BILIRUBIN TOTAL   0.3  Comment: For infants and newborns, interpretation of results should be based  on gestational age, weight and in agreement with clinical  observations.    Premature Infant recommended reference ranges:  Up to 24 hours.............<8.0 mg/dL  Up to 48 hours............<12.0 mg/dL  3-5 days..................<15.0 mg/dL  6-29 days.................<15.0 mg/dL           BNP   1,357  Comment: Values of less than 100 pg/ml are consistent with non-CHF populations.         BUN   61         Calcium   10.3         Chloride   109         CO2   22         Creatinine   2.6         Differential Method   Automated         eGFR   23.7         Eos #   0.1         Eos %   2.5         Glucose   130         Gran # (ANC)   2.6         Gran %   63.8         Group & Rh   O NEG         Hematocrit   20.0         Hemoglobin   6.4         Immature Grans (Abs)   0.01  Comment: Mild elevation in immature granulocytes is non specific and   can be seen in a variety of conditions including stress  response,   acute inflammation, trauma and pregnancy. Correlation with other   laboratory and clinical findings is essential.           Immature Granulocytes   0.3         INDIRECT CRISTIANO   NEG         INR 2.6  Comment: Coumadin Therapy:  2.0 - 3.0 for INR for all indicators except mechanical heart valves  and antiphospholipid syndromes which should use 2.5 - 3.5.             Lymph #   0.9         Lymph %   21.8         MCH   31.2         MCHC   32.0         MCV   98         Mono #   0.4         Mono %   10.8         MPV   10.5         nRBC   0         QRS Duration     158       OHS QTC Calculation     488       Platelet Count   185         Potassium   4.9         PROTEIN TOTAL   6.8         PT 26.5           RBC   2.05         RDW   15.0         Sodium   139         Specimen Outdate   09/29/2024 23:59         Troponin I   0.041  Comment: The reference interval for Troponin I represents the 99th percentile   cutoff   for our facility and is consistent with 3rd generation assay   performance.           WBC   4.00                 Significant Imaging:   X-Ray Chest AP Portable   Final Result      Cardiomegaly.  No evidence of pulmonary edema.         Electronically signed by: Lew Gaytan MD   Date:    09/26/2024   Time:    16:56          Assessment/Plan:     * Anemia  Patient with history of recurrent epistaxis requiring multiple ED visits presenting to ED for Hgb 6.5 on routine H/H for monitoring of coumadin. Currently with symptoms of dizziness and lightheadedness and shortness of breath. Etiology of anemia likely 2/2 recurrent epistaxis. Has been seen by ENT with multiple procedures without success in controlling epistaxis    -Transfuse 1 uPRBC  -CTM CBC qd  -Goal Hgb >7  -Holding warfarin in setting of anemia  -Consider ENT consult    Acute exacerbation of CHF (congestive heart failure)  Pt with hx HFmrEF with last TTE 5/30/2024 with EF 50-55%, severe dilation of left and right atrium, mechanical aortic valve,  severe MR. GDMT includes BB and bumex at home. Complaints of dyspnea on exertion and shortness of breath. Denies orthopnea, LE edema, PND. BNP 1,357 on admission, CXR without overt pulmonary edema on review. S/p 40 mg IV lasix in ED    - Continue lasix 40 mg qd  -Strict I/Os  -CTM BMP daily  -Daily weights  -CTM electrolytes and adjust      Recurrent epistaxis  Pt presenting to [facility] with recurrent epistaxis, follows closely with outpatient ENT. Hgb 6.4 on presentation. Patient currently denying epistaxis on 9/26 but with recent episodes.    -CTM daily CBC  -Nasal saline  -Consider ENT consult if continues to have epistaxis  -Transfuse 1 uPRBC      Chronic kidney disease, stage 4 (severe)  Creatine stable for now. BMP reviewed- noted Estimated Creatinine Clearance: 18.7 mL/min (A) (based on SCr of 2.6 mg/dL (H)). according to latest data. 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  Monitor UOP and serial BMP and adjust therapy as needed. Renally dose meds. Avoid nephrotoxic medications and procedures.    -Renally adjust medications  -Avoid nephrotoxic medications    Paroxysmal atrial fibrillation  Patient with Paroxysmal (<7 days) atrial fibrillation which is controlled currently with Beta Blocker. Patient is currently in sinus rhythm.Anticoagulation indicated. Anticoagulation done with coumadin . Currently denies palpitations    -Hold coumadin in setting of anemia  -Hold BB in setting of bradycardia, can consider restarting if HR increases    Coronary artery disease involving native coronary artery of native heart without angina pectoris  Pt with known CAD s/p CABG, on BB, Statin at home. Not on ASA per chart review.     -Hold BB in setting of bradycardia  -Continue statin  -Continue home IMDUR  -Continuous tele  -CTM CMP  -Trend troponin to peak    JIM (acute kidney injury)  JIM is likely due to pre-renal azotemia due to intravascular volume depletion. Baseline creatinine is  1.9-2.2 . Most recent  creatinine and eGFR are listed below.     Plan  - JIM is stable  - Avoid nephrotoxins and renally dose meds for GFR listed above  - Monitor urine output, serial BMP, and adjust therapy as needed  - Consider IV lasix in setting of fluid overload  - Transfuse 1 u pRBC    Type 2 diabetes mellitus with stage 4 chronic kidney disease, without long-term current use of insulin  Hx CKD stage for with EGFR 23.7. Cr elevated from baseline at 2.6. Likely 2/2 long term DM. Not on any antihyperglycemics. Last A1c 5.3    -CTM Cr  -Renally adjust meds  -Avoid nephrotoxic medications      Hyperlipidemia associated with type 2 diabetes mellitus  On crestor 40 mg at home. Last lipid panel with well controlled LDL    -Start atorvastatin 80 mg qd in placement of crestor, resume home meds at discharge      Chronic anticoagulation  Pt on coumadin for AVR. Currently with anemia likely 2/2 epistaxis. INR 2.6    -hold coumadin on admission  -Daily INR  -Consider restarting coumadin with control of anemia          VTE Risk Mitigation (From admission, onward)           Ordered     Reason for No Pharmacological VTE Prophylaxis  Once        Question:  Reasons:  Answer:  Risk of Bleeding    09/26/24 1747     IP VTE HIGH RISK PATIENT  Once         09/26/24 1747     Place sequential compression device  Until discontinued         09/26/24 1747                           On 09/26/2024, patient should be placed in hospital observation services under my care in collaboration with Hospital med team 4.         Ike Mccoy MD  Internal Medicine, PGY-1  Department of Hospital Medicine  [facility] - Emergency Dept

## 2024-09-27 NOTE — ASSESSMENT & PLAN NOTE
83 yo M with mechanical AVR on coumadin and warfarin. Extensive epistaxis history as detailed above who started having epistaxis from left nose. Hemoglobin currently stable at 8.1.    - Hemostasis achieved with Merocel to left nose. This will dissolve over 1-2 weeks.   - Bacitracin ointment applied liberally to nose. Keep nose packed with bacitracin  - Recommend afrin nasal spray BID  - Nasal saline spray TID x 3d to keep mucosa moist and facilitate resorption of the packing  - No Abx coverage required per ENT  - Call ENT for any major bleeding      Nasal precautions  - DO NOT blow your nose for 2 weeks. The only exception is that you may lightly blow your nose after using a sinus rinse.  - Sneeze/cough with mouth open  - Avoid irritating substances that might make you sneeze, such as dust, chalk, harsh chemicals, and allergic triggers. This might also include spicy foods.  - Do not smoke   - Avoid flying or swimming for 2 weeks.    - Avoid all heavy lifting, straining or bending for 2 weeks.   - Avoid semi-contact sports or vigorous exercising for 3-4 weeks.      Aftercare/ moisturizing recommendations to decrease frequency of nosebleeds:  - Daily nasal saline sprays/rinses  - Nightly application of vaseline/aquaphor to anterior nares  - Room humidification  - Avoidance of nasal cannula if possible (always with humidification and please use face tent, nasal cup, facemask if possible)  Management of medical conditions contributing to epistaxis (coagulopathy, hypertension, etc.)  - Humidification of CPAP appliance if applicable    If rebleeding occurs:   - Apply Afrin liberally to both nostrils  - Apply pressure over the soft part of the nose for 15 minutes (clip or digital pressure, important to have pressure over soft part of nose and consistent pressure (do not release pressure at any point))  - Instruct patient to lean forward, avoid swallowing blood. This can cause nausea and vomiting  - Can repeat these steps  above up to 3 times  - Call/reconsult ENT or return to the nearest emergency department if this is unsuccessful

## 2024-09-27 NOTE — ASSESSMENT & PLAN NOTE
Pt with hx HFmrEF with last TTE 5/30/2024 with EF 50-55%, severe dilation of left and right atrium, mechanical aortic valve, severe MR. GDMT includes BB and bumex at home. Complaints of dyspnea on exertion and shortness of breath. Denies orthopnea, LE edema, PND. BNP 1,357 on admission, CXR without overt pulmonary edema on review. S/p 40 mg IV lasix in ED. Repeat TTE on 9/27 with EF 45-50%, G3DD with abnormal wall motion noted    - Continue lasix 40 mg qd  -Strict I/Os  -CTM BMP daily  -Daily weights  -CTM electrolytes and adjust

## 2024-09-27 NOTE — ASSESSMENT & PLAN NOTE
Patient with history of recurrent epistaxis requiring multiple ED visits presenting to ED for Hgb 6.5 on routine H/H for monitoring of coumadin. Currently with symptoms of dizziness and lightheadedness and shortness of breath. Etiology of anemia likely 2/2 recurrent epistaxis. Has been seen by ENT with multiple procedures without success in controlling epistaxis    -S/p 1 uPRBC  -CTM CBC qd  -Goal Hgb >7  -Holding warfarin in setting of anemia, consider restarting if hgb improves  -ENT consulted in setting of recurrent epistaxis

## 2024-09-27 NOTE — ASSESSMENT & PLAN NOTE
On crestor 40 mg at home. Last lipid panel with well controlled LDL    -Start atorvastatin 80 mg qd in placement of crestor, resume home meds at discharge

## 2024-09-27 NOTE — ASSESSMENT & PLAN NOTE
Patient with history of recurrent epistaxis requiring multiple ED visits presenting to ED for Hgb 6.5 on routine H/H for monitoring of coumadin. Currently with symptoms of dizziness and lightheadedness and shortness of breath. Etiology of anemia likely 2/2 recurrent epistaxis. Has been seen by ENT with multiple procedures without success in controlling epistaxis    -Transfuse 1 uPRBC  -CTM CBC qd  -Goal Hgb >7  -Holding warfarin in setting of anemia  -Consider ENT consult

## 2024-09-27 NOTE — ASSESSMENT & PLAN NOTE
Creatine stable for now. BMP reviewed- noted Estimated Creatinine Clearance: 18.7 mL/min (A) (based on SCr of 2.6 mg/dL (H)). according to latest data. 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  Monitor UOP and serial BMP and adjust therapy as needed. Renally dose meds. Avoid nephrotoxic medications and procedures.    -Renally adjust medications  -Avoid nephrotoxic medications

## 2024-09-27 NOTE — PLAN OF CARE
[facility] - Cardiology Stepdown  Initial Discharge Assessment       Primary Care Provider: Devon Langston Jr., MD    Admission Diagnosis: HF (heart failure) [I50.9]  Chest pain [R07.9]  Chest pain, unspecified type [R07.9]    Admission Date: 9/26/2024  Expected Discharge Date: 9/28/2024    Transition of Care Barriers: None    Payor: HU[employer] MANAGED MEDICARE / Plan: [employer] SNP HMO PPO SPECIAL NEEDS / Product Type: Medicare Advantage /     Extended Emergency Contact Information  Primary Emergency Contact: Ameena Urbina  Address: [address] [place] [address] [facility]  Home Phone: [phone]  Mobile Phone: [phone]  Relation: Spouse    Discharge Plan A: Home with family  Discharge Plan B: Home      [facility] - [place], [place] - [address] [facility]  [address] [facility] [address]  Phone: [phone] Fax: [phone]    [facility] [address] [address]  Phone: [phone] Fax: [phone]    [id number] - [place], [place] - [address]  [address]  [place] [address]  Phone: [phone] Fax: [phone]      Initial Assessment (most recent)       Adult Discharge Assessment - 09/27/24 1212          Discharge Assessment    Assessment Type Discharge Planning Assessment     Confirmed/corrected address, phone number and insurance Yes     Confirmed Demographics Correct on Facesheet     Source of Information patient     Communicated ASTER with patient/caregiver Yes     Reason For Admission Anemia     People in Home spouse     Facility Arrived From: Home     Do you expect to return to your current living situation? Yes     Do you have help at home or someone to help you manage your care at home? Yes     Who are your caregiver(s) and their phone number(s)? Ameena Urbina (spouse) [phone]     Prior to hospitilization cognitive status: Alert/Oriented     Current cognitive status: Alert/Oriented     Walking or Climbing  Stairs Difficulty no     Dressing/Bathing Difficulty no     Equipment Currently Used at Home none     Readmission within 30 days? No     Patient currently being followed by outpatient case management? No     Do you currently have service(s) that help you manage your care at home? No     Do you take prescription medications? Yes     Do you have prescription coverage? Yes     Do you have any problems affording any of your prescribed medications? No     Is the patient taking medications as prescribed? yes     Who is going to help you get home at discharge? Ameena Urbina (spouse) [phone]     How do you get to doctors appointments? family or friend will provide     Are you on dialysis? No     Do you take coumadin? Yes     Who monitors your labs? [facility]     Discharge Plan A Home with family     Discharge Plan B Home     DME Needed Upon Discharge  none     Discharge Plan discussed with: Patient;Spouse/sig other     Name(s) and Number(s) Ameena Urbina (spouse) [phone]     Transition of Care Barriers None                   SW met with pt and wife Ameena at bedside to discuss discharge planning.  Pt lives with his wife and is independent with ambulation and ADLs.  Pt is on coumadin followed by the [facility].  No DME, HH, or dialysis.  PCP is Dr Rosenberg.  Pt will have transportation and assistance from his wife at discharge.  Discharge Plan A and Plan B have been determined by review of patient's clinical status, future medical and therapeutic needs, and coverage/benefits for post-acute care in coordination with multidisciplinary team members.  Discharge planning booklet provided.  Will continue to follow.      Rosalia Garrido, SABINO  [facility]  [id number]

## 2024-09-27 NOTE — PROGRESS NOTES
Patient arrived to the unit. Telemetry box on and vital signs documented in flow sheet. Identification band on patient. Pt oriented to room, plan of care reviewed, and admission completed. Call light and belongings within reach. Pt with no concerns at this time. Bed in low and locked position.    Nurses Note -- 4 Eyes        Skin assessed during: Admit      [] No Altered Skin Integrity Present    [x]Prevention Measures Documented      [] Yes- Altered Skin Integrity Present or Discovered   [] LDA Added if Not in Epic (Describe Wound)   [] New Altered Skin Integrity was Present on Admit and Documented in LDA   [] Wound Image Taken    Wound Care Consulted? No    Attending Nurse:  Chelsey Vázquez RN/Staff Member:   Noah

## 2024-09-27 NOTE — ASSESSMENT & PLAN NOTE
[place] [facility], follows closely with outpatient ENT. Hgb 6.4 on presentation. Patient currently denying epistaxis on 9/26 but with recent episodes.    -CTM daily CBC  -Nasal saline  -Nasal packing as needed  -ENT consulted for epistaxis, appreciate assistance      -S/p fibrillin packing  -S/p 1 uPRBC

## 2024-09-27 NOTE — ASSESSMENT & PLAN NOTE
Pt on coumadin for AVR. Currently with anemia likely 2/2 epistaxis. INR 2.6    -hold coumadin on admission  -Daily INR  -Consider restarting coumadin with control of anemia

## 2024-09-27 NOTE — ASSESSMENT & PLAN NOTE
JIM is likely due to pre-renal azotemia due to intravascular volume depletion. Baseline creatinine is  1.9-2.2 . Most recent creatinine and eGFR are listed below.     Plan  - JIM is stable  - Avoid nephrotoxins and renally dose meds for GFR listed above  - Monitor urine output, serial BMP, and adjust therapy as needed  - Consider IV lasix in setting of fluid overload  - S/p 1 u pRBC

## 2024-09-27 NOTE — ASSESSMENT & PLAN NOTE
JIM is likely due to pre-renal azotemia due to intravascular volume depletion. Baseline creatinine is  1.9-2.2 . Most recent creatinine and eGFR are listed below.     Plan  - JIM is stable  - Avoid nephrotoxins and renally dose meds for GFR listed above  - Monitor urine output, serial BMP, and adjust therapy as needed  - Consider IV lasix in setting of fluid overload  - Transfuse 1 u pRBC

## 2024-09-27 NOTE — PROGRESS NOTES
Pt blood in filter chamber during blood transfusion clotted. Blood bank notified and informed nurse a new order is needed and that they didn't need the remaining blood in the bag. Pt remained asymptomatic throughout transfusion process. VSS aside from baseline afib and bradycardia. Pt stated he experienced x1 black stool at 2345. MD notified, new order for blood palced. Pt with no concerns at this time. Call light and belongings within reach.

## 2024-09-27 NOTE — CONSULTS
[facility] - [facility]iology Step[facility]  Otorhinolaryngology-Head & Neck Surgery  Consult Note    Patient Name: Dariel Urbina  MRN: [id number]  Code Status: Full Code  Admission Date: 9/26/2024  Hospital Length of Stay: 0 days  Attending Physician: Inga Muir*  Primary Care Provider: Devon Langston Jr., MD    Patient information was obtained from patient, spouse/SO, past medical records, and ER records.     Inpatient consult to ENT  Consult performed by: Tatiana Jensen MD  Consult ordered by: Ike Mccoy MD        Subjective:     Chief Complaint/Reason for Admission: Low hemoglobin incidentally identified.    History of Present Illness: Dariel Urbina is a 83 year old male with a PMHx of CHF, CKD stage 4, CAD s/p CABG (1990s), mechanical AVR (on warfarin), moderate-severe MR, recurrent epistaxis, anemia, NSTEMI, PVD, recurrent epistaxis and type 2 diabetes who presented to the ED for low hgb and chest pain.      Patient is well known to ENT department and is under the care of Dr. Russell. He previously had undergone bilateral SP ligation followed by bilateral embolization, cautery on the left septum for persistent bleeding on 11/03/2023 and again on 12/18/23. He underwent left AEA ligation on 2/28/24 and underwent nasal endoscopy and control of epistaxis on 6/18/24. He then had repeat embolization on 08/06/24. Last seen by Dr. Russell in clinic on 9/24/2024 and Fibrillar packed in left nasal cavity along with AgNO3 cautery. Of note, patient is on Coumadin for mechanical AVR. He was incidentally found to have Hgb of 6.4 and sent to ED. In the ED, VSS. Labs significant for Hgb 6.4, Cr 2.6 (BL 1.7-2.2, consistently elevated on previous admissions), BNP 1357, troponin 0.041. CXR without evidence of pulmonary edema. Patient given 1 U pRBC and 40 mg IV lasix and admitted to hospital medicine for further workup and evaluation    ENT consulted for control of epistaxis that started on  9/27.    Medications:  Continuous Infusions:  Scheduled Meds:   allopurinoL  100 mg Oral Daily    atorvastatin  80 mg Oral Daily    ferrous gluconate  324 mg Oral Every other day    furosemide (LASIX) injection  40 mg Intravenous Daily    isosorbide mononitrate  60 mg Oral QHS    oxymetazoline  2 spray Each Nostril BID    sodium chloride  2 spray Each Nostril QID    warfarin  2.5 mg Oral Once per day on Wednesday Friday    [START ON 9/28/2024] warfarin  5 mg Oral Once per day on Sunday Monday Tuesday Thursday Saturday     PRN Meds:  Current Facility-Administered Medications:     0.9%  NaCl infusion (for blood administration), , Intravenous, Q24H PRN    acetaminophen, 650 mg, Oral, Q4H PRN    dextrose 10%, 12.5 g, Intravenous, PRN    dextrose 10%, 25 g, Intravenous, PRN    glucagon (human recombinant), 1 mg, Intramuscular, PRN    glucose, 16 g, Oral, PRN    glucose, 24 g, Oral, PRN    insulin aspart U-100, 0-5 Units, Subcutaneous, QID (AC + HS) PRN    naloxone, 0.02 mg, Intravenous, PRN    ondansetron, 8 mg, Oral, Q8H PRN    sodium chloride 0.9%, 10 mL, Intravenous, Q12H PRN     Current Facility-Administered Medications on File Prior to Encounter   Medication    ceFAZolin 2 g in dextrose 5 % in water (D5W) 50 mL IVPB (MB+)    HYDROmorphone injection 0.2 mg    sodium chloride 0.9% flush 10 mL     Current Outpatient Medications on File Prior to Encounter   Medication Sig    acetaminophen (TYLENOL) 500 MG tablet Take 500 mg by mouth daily as needed for Pain.    allopurinoL (ZYLOPRIM) 100 MG tablet Take 1 tablet (100 mg total) by mouth once daily.    bumetanide (BUMEX) 1 MG tablet Take 2 tablets (2 mg total) by mouth every other day.    ferrous gluconate (FERGON) 324 MG tablet TAKE 1 TABLET EVERY DAY WITH BREAKFAST    isosorbide mononitrate (IMDUR) 60 MG 24 hr tablet Take 1 tablet (60 mg total) by mouth every evening.    metoprolol succinate (TOPROL-XL) 25 MG 24 hr tablet TAKE 1 TABLET ONE TIME DAILY    omega-3 fatty  acids/fish oil (FISH OIL-OMEGA-3 FATTY ACIDS) 300-1,000 mg capsule Take 1 capsule by mouth once daily.    oxymetazoline (AFRIN) 0.05 % nasal spray 2 sprays by Nasal route as needed for Congestion (nose bleeds).    rosuvastatin (CRESTOR) 40 MG Tab TAKE 1 TABLET EVERY EVENING.    sodium chloride (SALINE NASAL) 0.65 % nasal spray Use 2 sprays by Nasal route every 4 hours. Apply into nasal cavities daily with nightly application of vaseline/aquaphor to anterior nares. Keep head of bed elevated.    traZODone (DESYREL) 50 MG tablet Take 1 tablet (50 mg total) by mouth every evening.    warfarin (COUMADIN) 5 MG tablet TAKE 1/2 TABLET (2.5MG) SATURDAY, THEN TAKE 1 TABLET (5MG) ALL OTHER DAYS OR AS INSTRUCTED BY COUMADIN CLINIC (Patient taking differently: Take 5 mg by mouth every evening.)       Review of patient's allergies indicates:   Allergen Reactions    Lisinopril     Losartan      Intolerance- elevates potassium level       Past Medical History:   Diagnosis Date    Anemia of chronic renal failure, stage 3 (moderate) 05/27/2015    Anticoagulant long-term use     Atherosclerosis of coronary artery bypass graft of native heart without angina pectoris 09/11/2012    3-27-18 [facility] Two vessel coronary artery disease.   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    Bilateral carotid artery disease 02/09/2017    Bleeding from the nose     Bleeding nose 03/21/2018    Cancer     Cataract     CHF (congestive heart failure)     CKD (chronic kidney disease) stage 3, GFR 30-59 ml/min 05/27/2015    Claudication of left lower extremity 09/17/2014    Colon polyp     Encounter for blood transfusion     Gastroesophageal reflux disease without esophagitis 03/19/2018    Gastrointestinal hemorrhage associated with intestinal diverticulosis 04/01/2018    Glaucoma     H/O mechanical aortic valve replacement 09/17/2014    History of gout 09/26/2012    Hyperparathyroidism due to renal insufficiency 07/27/2015    Internal  hemorrhoid 04/03/2018    Long term current use of anticoagulant therapy 09/26/2012    Mechanical heart valve present     Metabolic acidosis with normal anion gap and bicarbonate losses 03/20/2018    Mixed hyperlipidemia 09/26/2012    NSTEMI (non-ST elevated myocardial infarction) 03/21/2018    Obesity, diabetes, and hypertension syndrome 02/23/2016    Paroxysmal atrial fibrillation 02/06/2023    PVD (peripheral vascular disease) 09/11/2012    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     Past Surgical History:   Procedure Laterality Date    BACK SURGERY      CARDIAC CATHETERIZATION      CARDIAC VALVE REPLACEMENT      CARDIAC VALVE SURGERY      CARPAL TUNNEL RELEASE Right 05/19/2020    Procedure: RELEASE, CARPAL TUNNEL;  Surgeon: Rupesh Norris Jr., MD;  Location: [facility];  Service: Plastics;  Laterality: Right;    CATARACT EXTRACTION Left 11/13/2022        COLON SURGERY      COLONOSCOPY N/A 03/31/2017    Procedure: COLONOSCOPY;  Surgeon: Bruno Raymond MD;  [facility]  Service: Endoscopy;  Laterality: N/A;  Patient's wife requesting date.    COLONOSCOPY N/A 04/03/2018    Procedure: COLONOSCOPY;  Surgeon: Bonifacio Pelletier MD;  Location: [facility] ENDO (2ND FLR);  Service: Endoscopy;  Laterality: N/A;    COLONOSCOPY N/A 08/13/2018    Procedure: COLONOSCOPY;  Surgeon: Kam Barba MD;  Location: [facility] ENDO (2ND FLR);  Service: Endoscopy;  Laterality: N/A;  2nd floor: PA pressure 49; hx of moderate-severe valve disease     per Coumadin clinic-Patient can hold 5 days with lovenox bridge       ok to schedule per Katarina    CONTROL OF EPISTAXIS, POSTERIOR, USING NASAL  PACKING OR CAUTERIZATION Bilateral 6/18/2024    Procedure: CONTROL OF EPISTAXIS, POSTERIOR, USING NASAL PACKING OR CAUTERIZATION;  Surgeon: Jono Russell MD;  [facility];  Service: ENT;  Laterality: Bilateral;    CONTROL OF EPISTAXIS, POSTERIOR, USING NASAL PACKING OR CAUTERIZATION Bilateral 8/8/2024    Procedure: CONTROL OF EPISTAXIS, POSTERIOR, USING NASAL PACKING OR CAUTERIZATION;  Surgeon: Jono Russell MD;  [facility];  Service: ENT;  Laterality: Bilateral;  suction bovie, nasopore, endoscopes    CORONARY ANGIOGRAPHY N/A 10/04/2021    Procedure: Left heart cath +/- peripheral angiogram;  Surgeon: Jose Ruiz MD;  Location: [facility] CATH LAB;  Service: Cardiology;  Laterality: N/A;    CORONARY ANGIOGRAPHY N/A 3/2/2023    Procedure: Angiogram, Coronary;  Surgeon: Devon Garnica MD;  Location: [facility] CATH LAB;  Service: Cardiology;  Laterality: N/A;    CORONARY ANGIOPLASTY      CORONARY ARTERY BYPASS GRAFT      CORONARY BYPASS GRAFT ANGIOGRAPHY  10/04/2021    Procedure: Bypass graft study;  Surgeon: Jose Ruiz MD;  Location: [facility] CATH LAB;  Service: Cardiology;;    CORONARY BYPASS GRAFT ANGIOGRAPHY  3/2/2023    Procedure: Bypass graft study;  Surgeon: Devon Garnica MD;  Location: [facility] CATH LAB;  Service: Cardiology;;    ECHOCARDIOGRAM,TRANSESOPHAGEAL N/A 4/14/2023    Procedure: Transesophageal echo (KIRSTEN) intra-procedure log documentation;  Surgeon: [facility] Diagnostic Provider;  Location: [facility] EP LAB;  Service: Cardiology;  Laterality: N/A;    ENDOSCOPIC NASAL CAUTERIZATION Bilateral 11/3/2023    Procedure: CAUTERIZATION, NOSE, ENDOSCOPIC;  Surgeon: Jono Russell MD;  [facility];  Service: ENT;  Laterality: Bilateral;    ENDOSCOPIC NASAL CAUTERIZATION N/A 12/18/2023    Procedure: CAUTERIZATION, NOSE, ENDOSCOPIC;  Surgeon: Jono Russell MD;  [facility];  Service: ENT;  Laterality: N/A;    EYE SURGERY      FESS, WITH IMAGING GUIDANCE  Left 2/28/2024    Procedure: FESS, WITH IMAGING GUIDANCE;  Surgeon: Jono Russell MD;  Location: [facility] OR 2ND FLR;  Service: ENT;  Laterality: Left;  [facility]    FUNCTIONAL ENDOSCOPIC SINUS SURGERY (FESS) Bilateral 6/14/2023    Procedure: FESS (FUNCTIONAL ENDOSCOPIC SINUS SURGERY);  Surgeon: Jono Russell MD;  Location: [facility] OR 2ND FLR;  Service: ENT;  Laterality: Bilateral;    HERNIA REPAIR      INTRAOCULAR PROSTHESES INSERTION Left 11/13/2022    Procedure: INSERTION, IOL PROSTHESIS;  Surgeon: Alia Mckeon MD;  Location: [facility] OR 1ST FLR;  Service: Ophthalmology;  Laterality: Left;    INTRAOCULAR PROSTHESES INSERTION Right 12/04/2022    Procedure: INSERTION, IOL PROSTHESIS;  Surgeon: Alia Mckeon MD;  Location: [facility] OR 1ST FLR;  Service: Ophthalmology;  Laterality: Right;    LIGATION, ARTERY, ETHMOIDAL Left 2/28/2024    Procedure: LIGATION, ARTERY, ETHMOIDAL;  Surgeon: Jono Russell MD;  [facility] 2[facility] F[facility];  Service: ENT;  Laterality: Left;    LIGATION, ARTERY, SPHENOPALATINE, ENDOSCOPIC Bilateral 6/14/2023    Procedure: LIGATION, ARTERY, SPHENOPALATINE, ENDOSCOPIC;  Surgeon: Jono Russell MD;  [facility] 2[facility] F[facility];  Service: ENT;  Laterality: Bilateral;    NASAL ENDOSCOPY N/A 8/8/2024    Procedure: ENDOSCOPY, NOSE;  Surgeon: Jono Russell MD;  [facility] 2[facility] FLR;  Service: ENT;  Laterality: N/A;    PHACOEMULSIFICATION OF CATARACT Left 11/13/2022    Procedure: PHACOEMULSIFICATION, CATARACT;  Surgeon: Alia Mckeon MD;  [facility] 1ST FLR;  Service: Ophthalmology;  Laterality: Left;    PHACOEMULSIFICATION OF CATARACT Right 12/04/2022    Procedure: PHACOEMULSIFICATION, CATARACT;  Surgeon: Alia Mckeon MD;  Location: [facility] OR 1ST FLR;  Service: Ophthalmology;  Laterality: Right;    SPINE SURGERY      TRANSESOPHAGEAL ECHOCARDIOGRAPHY N/A 3/22/2023    Procedure: ECHOCARDIOGRAM, TRANSESOPHAGEAL;  Surgeon: [facility] Diagnostic Provider;  Location:  [facility] EP LAB;  Service: Anesthesiology;  Laterality: N/A;    TRANSESOPHAGEAL ECHOCARDIOGRAPHY N/A 2023    Procedure: ECHOCARDIOGRAM, TRANSESOPHAGEAL;  Surgeon: [facility] Diagnostic Provider;  Location: [facility] EP LAB;  Service: Anesthesiology;  Laterality: N/A;    VASECTOMY       Family History       Problem Relation (Age of Onset)    Alcohol abuse Father    Diabetes Brother    Heart disease Mother, Father, Brother, Sister    Heart failure Mother, Father, Brother    No Known Problems Sister, Maternal Grandmother, Maternal Grandfather, Paternal Grandmother, Paternal Grandfather, Maternal Aunt, Maternal Uncle, Paternal Aunt, Paternal Uncle          Tobacco Use    Smoking status: Former     Current packs/day: 0.00     Average packs/day: 1 pack/day for 20.0 years (20.0 ttl pk-yrs)     Types: Cigarettes     Start date: 1960     Quit date: 1980     Years since quittin.0     Passive exposure: Never    Smokeless tobacco: Never   Substance and Sexual Activity    Alcohol use: No    Drug use: No    Sexual activity: Not Currently     Partners: Female     Review of Systems   HENT:  Positive for congestion, nosebleeds and postnasal drip.    All other systems reviewed and are negative.    Objective:     Vital Signs (Most Recent):  Temp: 97.8 °F (36.6 °C) (24 1630)  Pulse: (!) 58 (24 1630)  Resp: 18 (24 1630)  BP: 134/62 (24 1630)  SpO2: 96 % (24 1630) Vital Signs (24h Range):  Temp:  [97.5 °F (36.4 °C)-98.6 °F (37 °C)] 97.8 °F (36.6 °C)  Pulse:  [45-80] 58  Resp:  [16-18] 18  SpO2:  [94 %-100 %] 96 %  BP: (125-168)/(48-72) 134/62     Weight: 68.9 kg (152 lb)  Body mass index is 25.29 kg/m².    Date 24 0700 - 24 0659   Shift 3427-4678 7489-2591 9761-9716 24 Hour Total   INTAKE   P.O. 360 120  480   Shift Total(mL/kg) 360(5.2) 120(1.7)  480(7)   OUTPUT   Urine(mL/kg/hr) 1250(2.3) 450  1700   Shift Total(mL/kg) 1250(18.1) 450(6.5)  1700(24.7)   Weight (kg) 68.9 68.9 68.9 68.9         Physical Exam     NAD  Awake and alert  Left nasal cavity with anterior septal perforation that is actively bleeding.   Right nasal cavity with no obvious site of bleeding identified  Posterior OP with blood streaks oozing from nasopharynx into hypopharynx - readily suctioned with no blood clot.  Anterior septal perforation approx 1.5cm  Normal WOB, no stridor or stertor on room air        Procedure: Control of epistaxis -- Nasal packing  After obtaining consent from the patient/family, anterior rhinoscopy performed. Blood and clot was suctioned from the left nasal cavity. Surgicel was then packed into the left nasal cavity with emphasis on packing the perforation site. At the conclusion of the procedure, no active bleeding around the nasal pack was seen. Bacitracin ointment was then liberally put into both nose    Significant Labs:  CBC:   Recent Labs   Lab 09/27/24  1327   WBC 5.46   RBC 2.67*   HGB 8.0*   HCT 25.7*      MCV 96   MCH 30.0   MCHC 31.1*     CMP:   Recent Labs   Lab 09/27/24  0528   GLU 79   CALCIUM 9.9   ALBUMIN 3.1*   PROT 6.6      K 4.7   CO2 21*      BUN 56*   CREATININE 2.4*   ALKPHOS 75   ALT 18   AST 25   BILITOT 1.7*       Significant Diagnostics:  None    Assessment/Plan:     Epistaxis  83 yo M with mechanical AVR on coumadin and warfarin. Extensive epistaxis history as detailed above who started having epistaxis from left nose. Hemoglobin currently stable at 8.1.    - Hemostasis achieved with Fibrillar to left nose. This will dissolve over 1-2 weeks.   - Bacitracin ointment applied liberally to nose. Keep nose packed with bacitracin  - Recommend afrin nasal spray BID  - Nasal saline spray TID x 3d to keep mucosa moist and facilitate resorption of the packing  - No Abx coverage required per ENT  - Call ENT for any major bleeding      Nasal precautions  - DO NOT blow your nose for 2 weeks. The only exception is that you may lightly blow your nose after using a sinus  rinse.  - Sneeze/cough with mouth open  - Avoid irritating substances that might make you sneeze, such as dust, chalk, harsh chemicals, and allergic triggers. This might also include spicy foods.  - Do not smoke   - Avoid flying or swimming for 2 weeks.    - Avoid all heavy lifting, straining or bending for 2 weeks.   - Avoid semi-contact sports or vigorous exercising for 3-4 weeks.      Aftercare/ moisturizing recommendations to decrease frequency of nosebleeds:  - Daily nasal saline sprays/rinses  - Nightly application of vaseline/aquaphor to anterior nares  - Room humidification  - Avoidance of nasal cannula if possible (always with humidification and please use face tent, nasal cup, facemask if possible)  Management of medical conditions contributing to epistaxis (coagulopathy, hypertension, etc.)  - Humidification of CPAP appliance if applicable    If rebleeding occurs:   - Apply Afrin liberally to both nostrils  - Apply pressure over the soft part of the nose for 15 minutes (clip or digital pressure, important to have pressure over soft part of nose and consistent pressure (do not release pressure at any point))  - Instruct patient to lean forward, avoid swallowing blood. This can cause nausea and vomiting  - Can repeat these steps above up to 3 times  - Call/reconsult ENT or return to the nearest emergency department if this is unsuccessful    VTE Risk Mitigation (From admission, onward)           Ordered     warfarin (COUMADIN) tablet 5 mg  Once per day on Sunday Monday Tuesday Thursday Saturday 09/27/24 1421     warfarin (COUMADIN) tablet 2.5 mg  Once per day on Wednesday Friday 09/27/24 1421     Reason for No Pharmacological VTE Prophylaxis  Once        Question:  Reasons:  Answer:  Risk of Bleeding    09/26/24 1747     IP VTE HIGH RISK PATIENT  Once         09/26/24 1747     Place sequential compression device  Until discontinued         09/26/24 1747                    Thank you for your  consult. I will follow-up with patient. Please contact us if you have any additional questions.    Tatiana Jensen MD  Otorhinolaryngology-Head & Neck Surgery  [facility] - Cardiology Stepdown

## 2024-09-27 NOTE — TELEPHONE ENCOUNTER
----- Message from Tiny Smith sent at 9/27/2024 10:03 AM CDT -----  Contact: [facility] mobile  [phone] Ms. Crook  [facility]  Caller is requesting an earlier appointment then we can schedule.  Caller is requesting a message be sent to the provider.    If this is for urgent care symptoms, did you offer other providers at this location, providers at other locations, or [facility]? (yes, no, n/a):  N/A    If this is for the patients physical, did you offer to schedule next available and put on wait list, or to see NP or PA for their physical?  (yes, no, n/a):  N/A    When is the next available appointment with their provider:  12/31/2024    Reason for the appointment:  HFU    Patient preference of timeframe to be scheduled:  As soon as possible    Would the patient like a call back, or a response through their [facility] portal?:   Call

## 2024-09-27 NOTE — SUBJECTIVE & OBJECTIVE
Medications:  Continuous Infusions:  Scheduled Meds:   allopurinoL  100 mg Oral Daily    atorvastatin  80 mg Oral Daily    ferrous gluconate  324 mg Oral Every other day    furosemide (LASIX) injection  40 mg Intravenous Daily    isosorbide mononitrate  60 mg Oral QHS    oxymetazoline  2 spray Each Nostril BID    sodium chloride  2 spray Each Nostril QID    warfarin  2.5 mg Oral Once per day on Wednesday Friday    [START ON 9/28/2024] warfarin  5 mg Oral Once per day on Sunday Monday Tuesday Thursday Saturday     PRN Meds:  Current Facility-Administered Medications:     0.9%  NaCl infusion (for blood administration), , Intravenous, Q24H PRN    acetaminophen, 650 mg, Oral, Q4H PRN    dextrose 10%, 12.5 g, Intravenous, PRN    dextrose 10%, 25 g, Intravenous, PRN    glucagon (human recombinant), 1 mg, Intramuscular, PRN    glucose, 16 g, Oral, PRN    glucose, 24 g, Oral, PRN    insulin aspart U-100, 0-5 Units, Subcutaneous, QID (AC + HS) PRN    naloxone, 0.02 mg, Intravenous, PRN    ondansetron, 8 mg, Oral, Q8H PRN    sodium chloride 0.9%, 10 mL, Intravenous, Q12H PRN     Current Facility-Administered Medications on File Prior to Encounter   Medication    ceFAZolin 2 g in dextrose 5 % in water (D5W) 50 mL IVPB (MB+)    HYDROmorphone injection 0.2 mg    sodium chloride 0.9% flush 10 mL     Current Outpatient Medications on File Prior to Encounter   Medication Sig    acetaminophen (TYLENOL) 500 MG tablet Take 500 mg by mouth daily as needed for Pain.    allopurinoL (ZYLOPRIM) 100 MG tablet Take 1 tablet (100 mg total) by mouth once daily.    bumetanide (BUMEX) 1 MG tablet Take 2 tablets (2 mg total) by mouth every other day.    ferrous gluconate (FERGON) 324 MG tablet TAKE 1 TABLET EVERY DAY WITH BREAKFAST    isosorbide mononitrate (IMDUR) 60 MG 24 hr tablet Take 1 tablet (60 mg total) by mouth every evening.    metoprolol succinate (TOPROL-XL) 25 MG 24 hr tablet TAKE 1 TABLET ONE TIME DAILY    omega-3 fatty acids/fish  oil (FISH OIL-OMEGA-3 FATTY ACIDS) 300-1,000 mg capsule Take 1 capsule by mouth once daily.    oxymetazoline (AFRIN) 0.05 % nasal spray 2 sprays by Nasal route as needed for Congestion (nose bleeds).    rosuvastatin (CRESTOR) 40 MG Tab TAKE 1 TABLET EVERY EVENING.    sodium chloride (SALINE NASAL) 0.65 % nasal spray Use 2 sprays by Nasal route every 4 hours. Apply into nasal cavities daily with nightly application of vaseline/aquaphor to anterior nares. Keep head of bed elevated.    traZODone (DESYREL) 50 MG tablet Take 1 tablet (50 mg total) by mouth every evening.    warfarin (COUMADIN) 5 MG tablet TAKE 1/2 TABLET (2.5MG) SATURDAY, THEN TAKE 1 TABLET (5MG) ALL OTHER DAYS OR AS INSTRUCTED BY COUMADIN CLINIC (Patient taking differently: Take 5 mg by mouth every evening.)       Review of patient's allergies indicates:   Allergen Reactions    Lisinopril     Losartan      Intolerance- elevates potassium level       Past Medical History:   Diagnosis Date    Anemia of chronic renal failure, stage 3 (moderate) 05/27/2015    Anticoagulant long-term use     Atherosclerosis of coronary artery bypass graft of native heart without angina pectoris 09/11/2012    3-27-18 [facility] Two vessel coronary artery disease.   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    Bilateral carotid artery disease 02/09/2017    Bleeding from the nose     Bleeding nose 03/21/2018    Cancer     Cataract     CHF (congestive heart failure)     CKD (chronic kidney disease) stage 3, GFR 30-59 ml/min 05/27/2015    Claudication of left lower extremity 09/17/2014    Colon polyp     Encounter for blood transfusion     Gastroesophageal reflux disease without esophagitis 03/19/2018    Gastrointestinal hemorrhage associated with intestinal diverticulosis 04/01/2018    Glaucoma     H/O mechanical aortic valve replacement 09/17/2014    History of gout 09/26/2012    Hyperparathyroidism due to renal insufficiency 07/27/2015    Internal hemorrhoid  04/03/2018    Long term current use of anticoagulant therapy 09/26/2012    Mechanical heart valve present     Metabolic acidosis with normal anion gap and bicarbonate losses 03/20/2018    Mixed hyperlipidemia 09/26/2012    NSTEMI (non-ST elevated myocardial infarction) 03/21/2018    Obesity, diabetes, and hypertension syndrome 02/23/2016    Paroxysmal atrial fibrillation 02/06/2023    PVD (peripheral vascular disease) 09/11/2012    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     Past Surgical History:   Procedure Laterality Date    BACK SURGERY      CARDIAC CATHETERIZATION      CARDIAC VALVE REPLACEMENT      CARDIAC VALVE SURGERY      CARPAL TUNNEL RELEASE Right 05/19/2020    Procedure: RELEASE, CARPAL TUNNEL;  Surgeon: Rupesh Norris Jr., MD;  Location: [facility];  Service: Plastics;  Laterality: Right;    CATARACT EXTRACTION Left 11/13/2022        COLON SURGERY      COLONOSCOPY N/A 03/31/2017    Procedure: COLONOSCOPY;  Surgeon: Bruno Raymond MD;  Location: [facility] (4TH FLR);  Service: Endoscopy;  Laterality: N/A;  Patient's wife requesting date.    COLONOSCOPY N/A 04/03/2018    Procedure: COLONOSCOPY;  Surgeon: Bonifacio Pelletier MD;  Location: [facility] ENDO (2ND FLR);  Service: Endoscopy;  Laterality: N/A;    COLONOSCOPY N/A 08/13/2018    Procedure: COLONOSCOPY;  Surgeon: Kam Barba MD;  Location: [facility] ENDO (2ND FLR);  Service: Endoscopy;  Laterality: N/A;  2nd floor: PA pressure 49; hx of moderate-severe valve disease     per Coumadin clinic-Patient can hold 5 days with lovenox bridge       ok to schedule per Katarina    CONTROL OF EPISTAXIS, POSTERIOR, USING NASAL PACKING OR  CAUTERIZATION Bilateral 6/18/2024    Procedure: CONTROL OF EPISTAXIS, POSTERIOR, USING NASAL PACKING OR CAUTERIZATION;  Surgeon: Jono Russell MD;  [facility];  Service: ENT;  Laterality: Bilateral;    CONTROL OF EPISTAXIS, POSTERIOR, USING NASAL PACKING OR CAUTERIZATION Bilateral 8/8/2024    Procedure: CONTROL OF EPISTAXIS, POSTERIOR, USING NASAL PACKING OR CAUTERIZATION;  Surgeon: Jono Russell MD;  [facility];  Service: ENT;  Laterality: Bilateral;  suction bovie, nasopore, endoscopes    CORONARY ANGIOGRAPHY N/A 10/04/2021    Procedure: Left heart cath +/- peripheral angiogram;  Surgeon: Jose Ruiz MD;  Location: [facility] CATH LAB;  Service: Cardiology;  Laterality: N/A;    CORONARY ANGIOGRAPHY N/A 3/2/2023    Procedure: Angiogram, Coronary;  Surgeon: Devon Garnica MD;  Location: [facility] CATH LAB;  Service: Cardiology;  Laterality: N/A;    CORONARY ANGIOPLASTY      CORONARY ARTERY BYPASS GRAFT      CORONARY BYPASS GRAFT ANGIOGRAPHY  10/04/2021    Procedure: Bypass graft study;  Surgeon: Jose Ruiz MD;  Location: [facility] CATH LAB;  Service: Cardiology;;    CORONARY BYPASS GRAFT ANGIOGRAPHY  3/2/2023    Procedure: Bypass graft study;  Surgeon: Devon Garnica MD;  Location: [facility] CATH LAB;  Service: Cardiology;;    ECHOCARDIOGRAM,TRANSESOPHAGEAL N/A 4/14/2023    Procedure: Transesophageal echo (KIRSTEN) intra-procedure log documentation;  Surgeon: [facility] Diagnostic Provider;  Location: [facility] EP LAB;  Service: Cardiology;  Laterality: N/A;    ENDOSCOPIC NASAL CAUTERIZATION Bilateral 11/3/2023    Procedure: CAUTERIZATION, NOSE, ENDOSCOPIC;  Surgeon: Jono Russell MD;  [facility];  Service: ENT;  Laterality: Bilateral;    ENDOSCOPIC NASAL CAUTERIZATION N/A 12/18/2023    Procedure: CAUTERIZATION, NOSE, ENDOSCOPIC;  Surgeon: Jono Russell MD;  [facility];  Service: ENT;  Laterality: N/A;    EYE SURGERY      FESS, WITH IMAGING GUIDANCE Left 2/28/2024     Procedure: FESS, WITH IMAGING GUIDANCE;  Surgeon: Jono Russell MD;  Location: [facility] OR 2ND FLR;  Service: ENT;  Laterality: Left;  Scan loaded ENT [employer]. CL    FUNCTIONAL ENDOSCOPIC SINUS SURGERY (FESS) Bilateral 6/14/2023    Procedure: FESS (FUNCTIONAL ENDOSCOPIC SINUS SURGERY);  Surgeon: Jono Russell MD;  Location: [facility] OR 2ND FLR;  Service: ENT;  Laterality: Bilateral;    HERNIA REPAIR      INTRAOCULAR PROSTHESES INSERTION Left 11/13/2022    Procedure: INSERTION, IOL PROSTHESIS;  Surgeon: Alia Mckeon MD;  Location: [facility] OR 1ST FLR;  Service: Ophthalmology;  Laterality: Left;    INTRAOCULAR PROSTHESES INSERTION Right 12/04/2022    Procedure: INSERTION, IOL PROSTHESIS;  Surgeon: Alia Mckeon MD;  Location: [facility] OR 1ST FLR;  Service: Ophthalmology;  Laterality: Right;    LIGATION, ARTERY, ETHMOIDAL Left 2/28/2024    Procedure: LIGATION, ARTERY, ETHMOIDAL;  Surgeon: Jono Russell MD;  Location: [facility] OR 2ND FLR;  Service: ENT;  Laterality: Left;    LIGATION, ARTERY, SPHENOPALATINE, ENDOSCOPIC Bilateral 6/14/2023    Procedure: LIGATION, ARTERY, SPHENOPALATINE, ENDOSCOPIC;  Surgeon: Jono Russell MD;  Location: [facility] OR 2ND FLR;  Service: ENT;  Laterality: Bilateral;    NASAL ENDOSCOPY N/A 8/8/2024    Procedure: ENDOSCOPY, NOSE;  Surgeon: Jono Russell MD;  Location: [facility] OR 2ND FLR;  Service: ENT;  Laterality: N/A;    PHACOEMULSIFICATION OF CATARACT Left 11/13/2022    Procedure: PHACOEMULSIFICATION, CATARACT;  Surgeon: Alia Mckeon MD;  Location: [facility] OR 1ST FLR;  Service: Ophthalmology;  Laterality: Left;    PHACOEMULSIFICATION OF CATARACT Right 12/04/2022    Procedure: PHACOEMULSIFICATION, CATARACT;  Surgeon: Alia Mckeon MD;  Location: [facility] OR 1ST FLR;  Service: Ophthalmology;  Laterality: Right;    SPINE SURGERY      TRANSESOPHAGEAL ECHOCARDIOGRAPHY N/A 3/22/2023    Procedure: ECHOCARDIOGRAM, TRANSESOPHAGEAL;  Surgeon: [facility] Diagnostic Provider;  Location: [facility] EP LAB;   Service: Anesthesiology;  Laterality: N/A;    TRANSESOPHAGEAL ECHOCARDIOGRAPHY N/A 2023    Procedure: ECHOCARDIOGRAM, TRANSESOPHAGEAL;  Surgeon: Z[facility] Diagnostic Provider;  Location: [facility] LAB;  Service: Anesthesiology;  Laterality: N/A;    VASECTOMY       Family History       Problem Relation (Age of Onset)    Alcohol abuse Father    Diabetes Brother    Heart disease Mother, Father, Brother, Sister    Heart failure Mother, Father, Brother    No Known Problems Sister, Maternal Grandmother, Maternal Grandfather, Paternal Grandmother, Paternal Grandfather, Maternal Aunt, Maternal Uncle, Paternal Aunt, Paternal Uncle          Tobacco Use    Smoking status: Former     Current packs/day: 0.00     Average packs/day: 1 pack/day for 20.0 years (20.0 ttl pk-yrs)     Types: Cigarettes     Start date: 1960     Quit date: 1980     Years since quittin.0     Passive exposure: Never    Smokeless tobacco: Never   Substance and Sexual Activity    Alcohol use: No    Drug use: No    Sexual activity: Not Currently     Partners: Female     Review of Systems   HENT:  Positive for congestion, nosebleeds and postnasal drip.    All other systems reviewed and are negative.    Objective:     Vital Signs (Most Recent):  Temp: 97.8 °F (36.6 °C) (24 1630)  Pulse: (!) 58 (24 1630)  Resp: 18 (24 1630)  BP: 134/62 (24 1630)  SpO2: 96 % (24 1630) Vital Signs (24h Range):  Temp:  [97.5 °F (36.4 °C)-98.6 °F (37 °C)] 97.8 °F (36.6 °C)  Pulse:  [45-80] 58  Resp:  [16-18] 18  SpO2:  [94 %-100 %] 96 %  BP: (125-168)/(48-72) 134/62     Weight: 68.9 kg (152 lb)  Body mass index is 25.29 kg/m².    Date 24 0700 - 24 0659   Shift 4491-7966 1612-0894 1599-9740 24 Hour Total   INTAKE   P.O. 360 120  480   Shift Total(mL/kg) 360(5.2) 120(1.7)  480(7)   OUTPUT   Urine(mL/kg/hr) 1250(2.3) 450  1700   Shift Total(mL/kg) 1250(18.1) 450(6.5)  1700(24.7)   Weight (kg) 68.9 68.9 68.9 68.9        Physical  Exam     NAD  Awake and alert  Left nasal cavity with anterior septal perforation that is actively bleeding.   Right nasal cavity with no obvious site of bleeding identified  Posterior OP with blood streaks oozing from nasopharynx into hypopharynx - readily suctioned with no blood clot.  Anterior septal perforation approx 1.5cm  Normal WOB, no stridor or stertor on room air        Procedure: Control of epistaxis -- Nasal packing  After obtaining consent from the patient/family, anterior rhinoscopy performed. Blood and clot was suctioned from the left nasal cavity. Surgicel was then packed into the left nasal cavity with emphasis on packing the perforation site. At the conclusion of the procedure, no active bleeding around the nasal pack was seen. Bacitracin ointment was then liberally put into both nose    Significant Labs:  CBC:   Recent Labs   Lab 09/27/24  1327   WBC 5.46   RBC 2.67*   HGB 8.0*   HCT 25.7*      MCV 96   MCH 30.0   MCHC 31.1*     CMP:   Recent Labs   Lab 09/27/24  0528   GLU 79   CALCIUM 9.9   ALBUMIN 3.1*   PROT 6.6      K 4.7   CO2 21*      BUN 56*   CREATININE 2.4*   ALKPHOS 75   ALT 18   AST 25   BILITOT 1.7*       Significant Diagnostics:  None

## 2024-09-27 NOTE — HOSPITAL COURSE
83M with hx AVR on coumadin, recurrent epistaxis followed by ENT with multiple procedures sent to ED after routine lab work noted low Hb (6.5). Patient did report symptoms of SOB, fatigue, daily epistaixs.  Also with reported chest pain on exertion and dyspnea on exertion. Also with JIM, elevated BNP concerning for CHF exacerbation. Given lasix and 1 uPRBC on admission. Improved shortness of breath and chest pain stable. EKG stable without acute ischemic findings. Troponin trended to peak. On 9/27 patient with episode of prolonged epistaxis. ENT consulted and packed tissue with fibrillin. Hemoglobin stable, has not had nose bleed since packing. Discharge today with referral to OP cardiology for heart failure management.

## 2024-09-27 NOTE — NURSING
Notified team pt's left nostril is bleeding, applied pressure and Pt said blood is going down the throat from nose, he has to spit out and looks bloody. Nose packed with gauze per Nadine.MD. Notified Nadine.MD pt refused gauze or pressure after first gauzed packing removed by pt, pt said nose still bleeding, he said he need to lay down to slow down the bleeding regardless RN suggested to sit up and lean forward and applied pressure. Afrin ordered. ENT consulted. 1335: pt refused Afrin d/t nose stopped bleeding.

## 2024-09-27 NOTE — PHARMACY MED REC
"Admission Medication History     The home medication history was taken by Jeannette Alas.    You may go to "Admission" then "Reconcile Home Medications" tabs to review and/or act upon these items.     The home medication list has been updated by the Pharmacy department.   Please read ALL comments highlighted in yellow.   Please address this information as you see fit.    Feel free to contact us if you have any questions or require assistance.    Medications listed below were obtained from: Patient/family and Analytic software- [facility] Medications   Medication Sig    acetaminophen (TYLENOL) 500 MG tablet   Take 500 mg by mouth daily as needed for Pain.    allopurinoL (ZYLOPRIM) 100 MG tablet   Take 1 tablet (100 mg total) by mouth once daily.    bumetanide (BUMEX) 1 MG tablet   Take 2 tablets (2 mg total) by mouth every other day.    ferrous gluconate (FERGON) 324 MG tablet   TAKE 1 TABLET EVERY DAY WITH BREAKFAST    isosorbide mononitrate (IMDUR) 60 MG 24 hr tablet   Take 1 tablet (60 mg total) by mouth every evening.    metoprolol succinate (TOPROL-XL) 25 MG 24 hr tablet   TAKE 1 TABLET ONE TIME DAILY    omega-3 fatty acids/fish oil (FISH OIL-OMEGA-3 FATTY ACIDS) 300-1,000 mg capsule   Take 1 capsule by mouth once daily.    oxymetazoline (AFRIN) 0.05 % nasal spray   2 sprays by Nasal route as needed for Congestion (nose bleeds).    rosuvastatin (CRESTOR) 40 MG Tab   TAKE 1 TABLET EVERY EVENING.    sodium chloride (SALINE NASAL) 0.65 % nasal spray Use 2 sprays by Nasal route every 4 hours. Apply into nasal cavities daily with nightly application of vaseline/aquaphor to anterior nares. Keep head of bed elevated.    traZODone (DESYREL) 50 MG tablet   Take 1 tablet (50 mg total) by mouth every evening.    warfarin (COUMADIN) 5 MG tablet TAKE 1/2 TABLET (2.5MG) SATURDAY, THEN TAKE 1 TABLET (5MG) ALL OTHER DAYS OR AS INSTRUCTED BY COUMADIN CLINIC (Patient taking differently: Take 5 mg by mouth every evening.) "     Jeannette Lassen  [id number]           .

## 2024-09-27 NOTE — ASSESSMENT & PLAN NOTE
Pt with known CAD s/p CABG, on BB, Statin at home. Not on ASA per chart review.     -Hold BB in setting of bradycardia  -Continue statin  -Continue home IMDUR  -Continuous tele  -CTM CMP  -Trend troponin to peak

## 2024-09-27 NOTE — PROGRESS NOTES
Abdulkadir Duval - Cardiology [facility] Medicine  Progress Note    Patient Name: Dariel Urbina  MRN: [id number]  Patient Class: IP- Inpatient   Admission Date: 9/26/2024  Length of Stay: 0 days  Attending Physician: Inga Muir*  Primary Care Provider: Devon Langston Jr., MD        Subjective:     Principal Problem:Anemia        HPI:  Dariel Urbina is a 83 year old male with a PMHx of CHF, CKD stage 4, CAD s/p CABG (1990s), mechanical AVR (on warfarin), moderate-severe MR, recurrent epistaxis, anemia, NSTEMI, PVD, recurrent epistaxis and type 2 diabetes presenting to the ED for low hgb and chest pain.     Patient has been having recurrent episodes of epistaxis for the past few months and has been followed by ENT. He previously had undergone bilateral SP ligation followed by bilateral embolization, cautery on the left septum for persistent bleeding on 11/03/2023 and again recently on 12/18/23. He recently underwent left AEA ligation on 2/28/24 and recently underwent nasal endoscopy and control of epistaxis on 6/18/24. He then had repeat embolization on 08/06/24. These procedures have not fully controlled epistaxis. Patient also on coumadin for an aortic valve replacement for which he was undergoing H/H. It was found that his Hgb was 6.5. Patient was called and recommended to come into the ED for further evaluation and pRBC.     Patient has noted increased dizziness and lightheadedness for the past few days worse than baseline. It has not affected his ADLs however he noted that he has had to be more careful performing daily tasks. Patient also with complaints of chest pain and shortness of breath. He states that his chest pain typically occurs on exertion after waling appx 10-20 steps after resting. These symptoms go away at rest. Patient with hx CAD on IMDUR at home which he mentions helps with his chest pain, however he has noticed his shortness of breath has been mildly worsened.    In the ED,  VSS. Labs significant for Hgb 6.4, Cr 2.6 (BL 1.7-2.2, consistently elevated on previous admissions), BNP 1357, troponin 0.041. CXR without evidence of pulmonary edema. Patient given 1 U pRBC and 40 mg IV lasix and admitted to hospital medicine for further workup and evaluation      Overview/Hospital Course:  83M with hx AVR on coumadin, recurrent epistaxis followed by ENT with multiple procedures sent to ED after routine lab work noted low Hb (6.5). Patient did report symptoms of SOB, fatigue, daily epistaixs.  Also with reported chest pain on exertion and dyspnea on exertion. Also with JIM, elevated BNP concerning for CHF exacerbation. Given lasix and 1 uPRBC on admission. Improved shortness of breath and chest pain stable. EKG stable without acute ischemic findings. Troponin trended to peak. On 9/27 patient with episode of prolonged epistaxis. ENT consulted and packed tissue with fibrillin. Continuing to monitor hgb status with recurrent epistaxis    Interval History: Overnight with episode of dark bowel movement, otherwise no acute events. Patient states he's feeling well after receiving unit of blood overnight with significant improvement in lightheadedness and shortness of breath. Denies orthopnea, PND, LE edema.      Review of Systems   Constitutional:  Negative for chills and fever.   HENT:  Positive for nosebleeds. Negative for congestion.    Respiratory:  Negative for cough, shortness of breath, wheezing and stridor.    Cardiovascular:  Negative for chest pain, palpitations and leg swelling.   Gastrointestinal:  Negative for abdominal distention, abdominal pain, constipation, diarrhea, nausea and vomiting.   Genitourinary:  Negative for dysuria.     Objective:     Vital Signs (Most Recent):  Temp: 98.2 °F (36.8 °C) (09/27/24 0750)  Pulse: (!) 54 (09/27/24 0750)  Resp: 18 (09/27/24 0750)  BP: (!) 148/68 (09/27/24 0750)  SpO2: 97 % (09/27/24 0750) Vital Signs (24h Range):  Temp:  [97.5 °F (36.4 °C)-98.6 °F  (37 °C)] 98.2 °F (36.8 °C)  Pulse:  [45-80] 54  Resp:  [16-25] 18  SpO2:  [94 %-98 %] 97 %  BP: (125-168)/() 148/68     Weight: 68.9 kg (152 lb)  Body mass index is 25.29 kg/m².    Intake/Output Summary (Last 24 hours) at 9/27/2024 0804  Last data filed at 9/27/2024 0421  Gross per 24 hour   Intake 812.17 ml   Output 325 ml   Net 487.17 ml         Physical Exam  Constitutional:       General: He is not in acute distress.     Appearance: Normal appearance.   HENT:      Head: Normocephalic and atraumatic.   Eyes:      General: No scleral icterus.     Extraocular Movements: Extraocular movements intact.   Cardiovascular:      Rate and Rhythm: Regular rhythm. Bradycardia present.      Pulses: Normal pulses.      Heart sounds: Murmur heard.      No friction rub. No gallop.   Pulmonary:      Effort: Pulmonary effort is normal. No respiratory distress.      Breath sounds: Normal breath sounds. No stridor. No wheezing, rhonchi or rales.   Chest:      Chest wall: No tenderness.   Abdominal:      General: Abdomen is flat. Bowel sounds are normal. There is no distension.      Palpations: Abdomen is soft. There is no mass.      Tenderness: There is no abdominal tenderness. There is no guarding or rebound.      Hernia: No hernia is present.   Musculoskeletal:      Right lower leg: No edema.      Left lower leg: No edema.   Skin:     General: Skin is warm and dry.      Capillary Refill: Capillary refill takes less than 2 seconds.      Coloration: Skin is not jaundiced.      Findings: No erythema or rash.   Neurological:      Mental Status: He is alert. Mental status is at baseline.             Significant Labs: All pertinent labs within the past 24 hours have been reviewed.    Significant Imaging: I have reviewed all pertinent imaging results/findings within the past 24 hours.    Assessment/Plan:      * Anemia  Patient with history of recurrent epistaxis requiring multiple ED visits presenting to ED for Hgb 6.5 on routine  H/H for monitoring of coumadin. Currently with symptoms of dizziness and lightheadedness and shortness of breath. Etiology of anemia likely 2/2 recurrent epistaxis. Has been seen by ENT with multiple procedures without success in controlling epistaxis    -S/p 1 uPRBC  -CTM CBC qd  -Goal Hgb >7  -Holding warfarin in setting of anemia, consider restarting if hgb improves  -ENT consulted in setting of recurrent epistaxis    Acute exacerbation of CHF (congestive heart failure)  Pt with hx HFmrEF with last TTE 5/30/2024 with EF 50-55%, severe dilation of left and right atrium, mechanical aortic valve, severe MR. GDMT includes BB and bumex at home. Complaints of dyspnea on exertion and shortness of breath. Denies orthopnea, LE edema, PND. BNP 1,357 on admission, CXR without overt pulmonary edema on review. S/p 40 mg IV lasix in ED. Repeat TTE on 9/27 with EF 45-50%, G3DD with abnormal wall motion noted    - Continue lasix 40 mg qd  -Strict I/Os  -CTM BMP daily  -Daily weights  -CTM electrolytes and adjust      Recurrent epistaxis  Pt presenting to [facility] with recurrent epistaxis, follows closely with outpatient ENT. Hgb 6.4 on presentation. Patient currently denying epistaxis on 9/26 but with recent episodes.    -CTM daily CBC  -Nasal saline  -Nasal packing as needed  -ENT consulted for epistaxis, appreciate assistance      -S/p fibrillin packing  -S/p 1 [facility]      Chronic kidney disease, stage 4 (severe)  Creatine stable for now. BMP reviewed- noted Estimated Creatinine Clearance: 20.3 mL/min (A) (based on SCr of 2.4 mg/dL (H)). according to latest data. 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  Monitor UOP and serial BMP and adjust therapy as needed. Renally dose meds. Avoid nephrotoxic medications and procedures.    -Renally adjust medications  -Avoid nephrotoxic medications    Paroxysmal atrial fibrillation  Patient with Paroxysmal (<7 days) atrial fibrillation which is controlled currently with Beta  Blocker. Patient is currently in sinus rhythm.Anticoagulation indicated. Anticoagulation done with coumadin . Currently denies palpitations    -Hold coumadin in setting of anemia  -Hold BB in setting of bradycardia, can consider restarting if HR increases    Coronary artery disease involving native coronary artery of native heart without angina pectoris  Pt with known CAD s/p CABG, on BB, Statin at home. Not on ASA per chart review.     -Hold BB in setting of bradycardia  -Continue statin  -Continue home IMDUR  -Continuous tele  -CTM CMP  -Trend troponin to peak    JIM (acute kidney injury)  JIM is likely due to pre-renal azotemia due to intravascular volume depletion. Baseline creatinine is  1.9-2.2 . Most recent creatinine and eGFR are listed below.     Plan  - JIM is stable  - Avoid nephrotoxins and renally dose meds for GFR listed above  - Monitor urine output, serial BMP, and adjust therapy as needed  - Consider IV lasix in setting of fluid overload  - S/p 1 u pRBC    Type 2 diabetes mellitus with stage 4 chronic kidney disease, without long-term current use of insulin  Hx CKD stage for with EGFR 23.7. Cr elevated from baseline at 2.6. Likely 2/2 long term DM. Not on any antihyperglycemics. Last A1c 5.3    -CTM Cr  -Renally adjust meds  -Avoid nephrotoxic medications      Hyperlipidemia associated with type 2 diabetes mellitus  On crestor 40 mg at home. Last lipid panel with well controlled LDL    -Continue atorvastatin 80 mg qd in placement of crestor, resume home meds at discharge      Chronic anticoagulation  Pt on coumadin for AVR. Currently with anemia likely 2/2 epistaxis. INR 2.6    -hold coumadin on admission  -Daily INR  -Consider restarting coumadin with control of anemia          VTE Risk Mitigation (From admission, onward)           Ordered     Reason for No Pharmacological VTE Prophylaxis  Once        Question:  Reasons:  Answer:  Risk of Bleeding    09/26/24 0203     IP VTE HIGH RISK PATIENT  Once          09/26/24 1747     Place sequential compression device  Until discontinued         09/26/24 1747                    Discharge Planning   ASTER: 9/28/2024     Code Status: Full Code   Is the patient medically ready for discharge?:     Reason for patient still in hospital (select all that apply): Patient trending condition  Discharge Plan A: Home with family                  Ike Mccoy MD  Internal Medicine, PGY-1  Department of [facility] Medicine   [facility] - Cardiology Stepdown

## 2024-09-27 NOTE — HPI
Dariel Urbina is a 83 year old male with a PMHx of CHF, CKD stage 4, CAD s/p CABG (1990s), mechanical AVR (on warfarin), moderate-severe MR, recurrent epistaxis, anemia, NSTEMI, PVD, recurrent epistaxis and type 2 diabetes who presented to the ED for low hgb and chest pain.      Patient is well known to ENT department and is under the care of Dr. Russell. He previously had undergone bilateral SP ligation followed by bilateral embolization, cautery on the left septum for persistent bleeding on 11/03/2023 and again on 12/18/23. He underwent left AEA ligation on 2/28/24 and underwent nasal endoscopy and control of epistaxis on 6/18/24. He then had repeat embolization on 08/06/24. Last seen by Dr. Russell in clinic on 9/24/2024 and Fibrillar packed in left nasal cavity along with AgNO3 cautery. Of note, patient is on Coumadin for mechanical AVR. He was incidentally found to have Hgb of 6.4 and sent to ED. In the ED, VSS. Labs significant for Hgb 6.4, Cr 2.6 (BL 1.7-2.2, consistently elevated on previous admissions), BNP 1357, troponin 0.041. CXR without evidence of pulmonary edema. Patient given 1 U pRBC and 40 mg IV lasix and admitted to hospital medicine for further workup and evaluation    ENT consulted for control of epistaxis that started on 9/27.

## 2024-09-27 NOTE — ASSESSMENT & PLAN NOTE
On crestor 40 mg at home. Last lipid panel with well controlled LDL    -Continue atorvastatin 80 mg qd in placement of crestor, resume home meds at discharge

## 2024-09-27 NOTE — ASSESSMENT & PLAN NOTE
Creatine stable for now. BMP reviewed- noted Estimated Creatinine Clearance: 20.3 mL/min (A) (based on SCr of 2.4 mg/dL (H)). according to latest data. 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  Monitor UOP and serial BMP and adjust therapy as needed. Renally dose meds. Avoid nephrotoxic medications and procedures.    -Renally adjust medications  -Avoid nephrotoxic medications

## 2024-09-27 NOTE — ASSESSMENT & PLAN NOTE
Patient with Paroxysmal (<7 days) atrial fibrillation which is controlled currently with Beta Blocker. Patient is currently in sinus rhythm.Anticoagulation indicated. Anticoagulation done with coumadin . Currently denies palpitations    -Hold coumadin in setting of anemia  -Hold BB in setting of bradycardia, can consider restarting if HR increases

## 2024-09-27 NOTE — ASSESSMENT & PLAN NOTE
Pt presenting to [facility] with recurrent epistaxis, follows closely with outpatient ENT. Hgb 6.4 on presentation. Patient currently denying epistaxis on 9/26 but with recent episodes.    -CTM daily CBC  -Nasal saline  -Consider ENT consult if continues to have epistaxis  -Transfuse 1 uPRBC

## 2024-09-27 NOTE — SUBJECTIVE & OBJECTIVE
Interval History: Overnight with episode of dark bowel movement, otherwise no acute events. Patient states he's feeling well after receiving unit of blood overnight with significant improvement in lightheadedness and shortness of breath. Denies orthopnea, PND, LE edema.      Review of Systems   Constitutional:  Negative for chills and fever.   HENT:  Positive for nosebleeds. Negative for congestion.    Respiratory:  Negative for cough, shortness of breath, wheezing and stridor.    Cardiovascular:  Negative for chest pain, palpitations and leg swelling.   Gastrointestinal:  Negative for abdominal distention, abdominal pain, constipation, diarrhea, nausea and vomiting.   Genitourinary:  Negative for dysuria.     Objective:     Vital Signs (Most Recent):  Temp: 98.2 °F (36.8 °C) (09/27/24 0750)  Pulse: (!) 54 (09/27/24 0750)  Resp: 18 (09/27/24 0750)  BP: (!) 148/68 (09/27/24 0750)  SpO2: 97 % (09/27/24 0750) Vital Signs (24h Range):  Temp:  [97.5 °F (36.4 °C)-98.6 °F (37 °C)] 98.2 °F (36.8 °C)  Pulse:  [45-80] 54  Resp:  [16-25] 18  SpO2:  [94 %-98 %] 97 %  BP: (125-168)/() 148/68     Weight: 68.9 kg (152 lb)  Body mass index is 25.29 kg/m².    Intake/Output Summary (Last 24 hours) at 9/27/2024 0804  Last data filed at 9/27/2024 0421  Gross per 24 hour   Intake 812.17 ml   Output 325 ml   Net 487.17 ml         Physical Exam  Constitutional:       General: He is not in acute distress.     Appearance: Normal appearance.   HENT:      Head: Normocephalic and atraumatic.   Eyes:      General: No scleral icterus.     Extraocular Movements: Extraocular movements intact.   Cardiovascular:      Rate and Rhythm: Regular rhythm. Bradycardia present.      Pulses: Normal pulses.      Heart sounds: Murmur heard.      No friction rub. No gallop.   Pulmonary:      Effort: Pulmonary effort is normal. No respiratory distress.      Breath sounds: Normal breath sounds. No stridor. No wheezing, rhonchi or rales.   Chest:      Chest  wall: No tenderness.   Abdominal:      General: Abdomen is flat. Bowel sounds are normal. There is no distension.      Palpations: Abdomen is soft. There is no mass.      Tenderness: There is no abdominal tenderness. There is no guarding or rebound.      Hernia: No hernia is present.   Musculoskeletal:      Right lower leg: No edema.      Left lower leg: No edema.   Skin:     General: Skin is warm and dry.      Capillary Refill: Capillary refill takes less than 2 seconds.      Coloration: Skin is not jaundiced.      Findings: No erythema or rash.   Neurological:      Mental Status: He is alert. Mental status is at baseline.             Significant Labs: All pertinent labs within the past 24 hours have been reviewed.    Significant Imaging: I have reviewed all pertinent imaging results/findings within the past 24 hours.

## 2024-09-27 NOTE — PLAN OF CARE
Notified via patient's nurse that patient is bradycardic (mid to high 40s). Pt evaluated and is asymptomatic at this time. EKG ordered which shows AF with rate in high 40s. All beta-blockers being held at this time. Will continue to monitor.

## 2024-09-27 NOTE — PLAN OF CARE
Pt maintained free from falls/trauma/injuries. Pt denied pain or discomfort. Plan of care reviewed. Pt verbalized understanding. 1 unit PRBCs given. All questions and concerns addressed. VSS; call light and belongings within reach. Pt with no concerns at this time.    Problem: Adult Inpatient Plan of Care  Goal: Plan of Care Review  Outcome: Progressing  Goal: Patient-Specific Goal (Individualized)  Outcome: Progressing     Problem: Fall Injury Risk  Goal: Absence of Fall and Fall-Related Injury  Outcome: Progressing     Problem: Diabetes Comorbidity  Goal: Blood Glucose Level Within Targeted Range  Outcome: Progressing     Problem: Acute Kidney Injury/Impairment  Goal: Fluid and Electrolyte Balance  Outcome: Progressing  Goal: Improved Oral Intake  Outcome: Progressing  Goal: Effective Renal Function  Outcome: Progressing

## 2024-09-27 NOTE — ASSESSMENT & PLAN NOTE
Pt with hx HFmrEF with last TTE 5/30/2024 with EF 50-55%, severe dilation of left and right atrium, mechanical aortic valve, severe MR. GDMT includes BB and bumex at home. Complaints of dyspnea on exertion and shortness of breath. Denies orthopnea, LE edema, PND. BNP 1,357 on admission, CXR without overt pulmonary edema on review. S/p 40 mg IV lasix in ED    - Continue lasix 40 mg qd  -Strict I/Os  -CTM BMP daily  -Daily weights  -CTM electrolytes and adjust

## 2024-09-27 NOTE — ED PROVIDER NOTES
Encounter Date: 9/26/2024       History     Chief Complaint   Patient presents with    Chest Pain     Chest pain since yesterday, had blood work done on 25th and Hgb was 6.5 so was told to come to ER for blood transfusion      Pt is an 84 year old male with hx of CHF, CKD, mechanical aortic valve, GERD, DM, peripheral vascular disease, HTN, chronic epistaxis who presents for anemia found on routine outpatient labs. He also complains of left sided chest pain and shortness of breath that worsen with exertion. No fever, chills, nausea, vomiting, or abdominal pain    The history is provided by the patient and the spouse.     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    Bilateral carotid artery disease 02/09/2017    Bleeding from the nose     Bleeding nose 03/21/2018    Cancer     Cataract     CHF (congestive heart failure)     CKD (chronic kidney disease) stage 3, GFR 30-59 ml/min 05/27/2015    Claudication of left lower extremity 09/17/2014    Colon polyp     Encounter for blood transfusion     Gastroesophageal reflux disease without esophagitis 03/19/2018    Gastrointestinal hemorrhage associated with intestinal diverticulosis 04/01/2018    Glaucoma     H/O mechanical aortic valve replacement 09/17/2014    History of gout 09/26/2012    Hyperparathyroidism due to renal insufficiency 07/27/2015    Internal hemorrhoid 04/03/2018    Long term current use of anticoagulant therapy 09/26/2012    Mechanical heart valve present     Metabolic acidosis with normal anion gap and  bicarbonate losses 03/20/2018    Mixed hyperlipidemia 09/26/2012    NSTEMI (non-ST elevated myocardial infarction) 03/21/2018    Obesity, diabetes, and hypertension syndrome 02/23/2016    Paroxysmal atrial fibrillation 02/06/2023    PVD (peripheral vascular disease) 09/11/2012    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     Past Surgical History:   Procedure Laterality Date    BACK SURGERY      CARDIAC CATHETERIZATION      CARDIAC VALVE REPLACEMENT      CARDIAC VALVE SURGERY      CARPAL TUNNEL RELEASE Right 05/19/2020    Procedure: RELEASE, CARPAL TUNNEL;  Surgeon: Rupesh Norris Jr., MD;  Location: [facility];  Service: Plastics;  Laterality: Right;    CATARACT EXTRACTION Left 11/13/2022        COLON SURGERY      COLONOSCOPY N/A 03/31/2017    Procedure: COLONOSCOPY;  Surgeon: Bruno Raymond MD;  Location: [facility] (4TH FLR);  Service: Endoscopy;  Laterality: N/A;  Patient's wife requesting date.    COLONOSCOPY N/A 04/03/2018    Procedure: COLONOSCOPY;  Surgeon: Bonifacio Pelletier MD;  Location: [facility] (2ND FLR);  Service: Endoscopy;  Laterality: N/A;    COLONOSCOPY N/A 08/13/2018    Procedure: COLONOSCOPY;  Surgeon: Kam Barba MD;  Location: [facility] (2ND FLR);  Service: Endoscopy;  Laterality: N/A;  2nd floor: PA pressure 49; hx of moderate-severe valve disease     per Coumadin clinic-Patient can hold 5 days with lovenox bridge       ok to schedule per Katarina    CONTROL OF EPISTAXIS, POSTERIOR, USING NASAL PACKING OR CAUTERIZATION Bilateral 6/18/2024    Procedure: CONTROL OF EPISTAXIS, POSTERIOR, USING NASAL PACKING OR CAUTERIZATION;  Surgeon: Jono Russell MD;   Location: [facility] OR 2ND FLR;  Service: ENT;  Laterality: Bilateral;    CONTROL OF EPISTAXIS, POSTERIOR, USING NASAL PACKING OR CAUTERIZATION Bilateral 8/8/2024    Procedure: CONTROL OF EPISTAXIS, POSTERIOR, USING NASAL PACKING OR CAUTERIZATION;  Surgeon: Jono Russell MD;  Location: [facility] OR 2ND FLR;  Service: ENT;  Laterality: Bilateral;  suction bovie, nasopore, endoscopes    CORONARY ANGIOGRAPHY N/A 10/04/2021    Procedure: Left heart cath +/- peripheral angiogram;  Surgeon: Jose Ruiz MD;  Location: [facility] CATH LAB;  Service: Cardiology;  Laterality: N/A;    CORONARY ANGIOGRAPHY N/A 3/2/2023    Procedure: Angiogram, Coronary;  Surgeon: Devon Garnica MD;  Location: [facility] CATH LAB;  Service: Cardiology;  Laterality: N/A;    CORONARY ANGIOPLASTY      CORONARY ARTERY BYPASS GRAFT      CORONARY BYPASS GRAFT ANGIOGRAPHY  10/04/2021    Procedure: Bypass graft study;  Surgeon: Jose Ruiz MD;  Location: [facility] CATH LAB;  Service: Cardiology;;    CORONARY BYPASS GRAFT ANGIOGRAPHY  3/2/2023    Procedure: Bypass graft study;  Surgeon: Devon Garnica MD;  Location: [facility] CATH LAB;  Service: Cardiology;;    ECHOCARDIOGRAM,TRANSESOPHAGEAL N/A 4/14/2023    Procedure: Transesophageal echo (KIRSTEN) intra-procedure log documentation;  Surgeon: [facility] Diagnostic Provider;  Location: [facility] EP LAB;  Service: Cardiology;  Laterality: N/A;    ENDOSCOPIC NASAL CAUTERIZATION Bilateral 11/3/2023    Procedure: CAUTERIZATION, NOSE, ENDOSCOPIC;  Surgeon: Jono Russell MD;  Location: [facility] OR [facility] FLR;  Service: ENT;  Laterality: Bilateral;    ENDOSCOPIC NASAL CAUTERIZATION N/A 12/18/2023    Procedure: CAUTERIZATION, NOSE, ENDOSCOPIC;  Surgeon: Jono Russell MD;  Location: [facility] OR [facility] FLR;  Service: ENT;  Laterality: N/A;    EYE SURGERY      FESS, WITH IMAGING GUIDANCE Left 2/28/2024    Procedure: FESS, WITH IMAGING GUIDANCE;  Surgeon: Jono Russell MD;  Location: [facility] OR 2ND FLR;  Service: ENT;  Laterality: Left;  Scan loaded  ENT [facility]. CL    FUNCTIONAL ENDOSCOPIC SINUS SURGERY (FESS) Bilateral 6/14/2023    Procedure: FESS (FUNCTIONAL ENDOSCOPIC SINUS SURGERY);  Surgeon: Jono Russell MD;  Location: [facility] OR 2ND FLR;  Service: ENT;  Laterality: Bilateral;    HERNIA REPAIR      INTRAOCULAR PROSTHESES INSERTION Left 11/13/2022    Procedure: INSERTION, IOL PROSTHESIS;  Surgeon: Alia Mckeon MD;  Location: [facility] OR 1ST FLR;  Service: Ophthalmology;  Laterality: Left;    INTRAOCULAR PROSTHESES INSERTION Right 12/04/2022    Procedure: INSERTION, IOL PROSTHESIS;  Surgeon: Alia Mckeon MD;  Location: [facility] OR 1ST FLR;  Service: Ophthalmology;  Laterality: Right;    LIGATION, ARTERY, ETHMOIDAL Left 2/28/2024    Procedure: LIGATION, ARTERY, ETHMOIDAL;  Surgeon: Jono Russell MD;  Location: [facility] OR 2ND FLR;  Service: ENT;  Laterality: Left;    LIGATION, ARTERY, SPHENOPALATINE, ENDOSCOPIC Bilateral 6/14/2023    Procedure: LIGATION, ARTERY, SPHENOPALATINE, ENDOSCOPIC;  Surgeon: Jono Russell MD;  Location: [facility] OR 2ND FLR;  Service: ENT;  Laterality: Bilateral;    NASAL ENDOSCOPY N/A 8/8/2024    Procedure: ENDOSCOPY, NOSE;  Surgeon: Jono Russell MD;  Location: [facility] OR 2ND FLR;  Service: ENT;  Laterality: N/A;    PHACOEMULSIFICATION OF CATARACT Left 11/13/2022    Procedure: PHACOEMULSIFICATION, CATARACT;  Surgeon: Alia Mckeon MD;  Location: [facility] OR 1ST FLR;  Service: Ophthalmology;  Laterality: Left;    PHACOEMULSIFICATION OF CATARACT Right 12/04/2022    Procedure: PHACOEMULSIFICATION, CATARACT;  Surgeon: Alia Mckeon MD;  Location: [facility] OR 1ST FLR;  Service: Ophthalmology;  Laterality: Right;    SPINE SURGERY      TRANSESOPHAGEAL ECHOCARDIOGRAPHY N/A 3/22/2023    Procedure: ECHOCARDIOGRAM, TRANSESOPHAGEAL;  Surgeon: [facility] Diagnostic Provider;  Location: [facility] EP LAB;  Service: Anesthesiology;  Laterality: N/A;    TRANSESOPHAGEAL ECHOCARDIOGRAPHY N/A 4/11/2023    Procedure: ECHOCARDIOGRAM, TRANSESOPHAGEAL;  Surgeon:  [facility] Diagnostic Provider;  Location: [facility] EP LAB;  Service: Anesthesiology;  Laterality: N/A;    VASECTOMY       Family History   Problem Relation Name Age of Onset    Heart failure Mother      Heart disease Mother      Heart failure Father      Heart disease Father      Alcohol abuse Father      Heart failure Brother      Heart disease Brother      Diabetes Brother      No Known Problems Sister      No Known Problems Maternal Grandmother      No Known Problems Maternal Grandfather      No Known Problems Paternal Grandmother      No Known Problems Paternal Grandfather      Heart disease Sister      No Known Problems Maternal Aunt      No Known Problems Maternal Uncle      No Known Problems Paternal Aunt      No Known Problems Paternal Uncle      Amblyopia Neg Hx      Blindness Neg Hx      Cancer Neg Hx      Cataracts Neg Hx      Glaucoma Neg Hx      Hypertension Neg Hx      Macular degeneration Neg Hx      Retinal detachment Neg Hx      Strabismus Neg Hx      Stroke Neg Hx      Thyroid disease Neg Hx      Anemia Neg Hx      Arrhythmia Neg Hx      Asthma Neg Hx      Clotting disorder Neg Hx      Fainting Neg Hx      Heart attack Neg Hx      Hyperlipidemia Neg Hx      Atrial Septal Defect Neg Hx      Melanoma Neg Hx       Social History     Tobacco Use    Smoking status: Former     Current packs/day: 0.00     Average packs/day: 1 pack/day for 20.0 years (20.0 ttl pk-yrs)     Types: Cigarettes     Start date: 1960     Quit date: 1980     Years since quittin.0     Passive exposure: Never    Smokeless tobacco: Never   Substance Use Topics    Alcohol use: No    Drug use: No     Review of Systems    Physical Exam     Initial Vitals [24 1525]   BP Pulse Resp Temp SpO2   (!) 132/100 (!) 51 16 98 °F (36.7 °C) 97 %      MAP       --         Physical Exam    Nursing note and vitals reviewed.  Constitutional: He appears well-developed and well-nourished. No distress.   HENT:   Head: Normocephalic and  atraumatic.   Eyes: EOM are normal. Pupils are equal, round, and reactive to light.   Neck: Neck supple.   Cardiovascular:  Normal rate, regular rhythm and intact distal pulses.           Pulmonary/Chest: Breath sounds normal. No respiratory distress. He has no wheezes.   Abdominal: Abdomen is soft. He exhibits no distension. There is no abdominal tenderness.   Musculoskeletal:         General: No edema. Normal range of motion.      Cervical back: Neck supple.     Neurological: He is alert and oriented to person, place, and time.   Skin: Skin is warm and dry.         ED Course   Procedures  Labs Reviewed   CBC W/ AUTO DIFFERENTIAL - Abnormal       Result Value    WBC 4.00      RBC 2.05 (*)     Hemoglobin 6.4 (*)     Hematocrit 20.0 (*)     MCV 98      MCH 31.2 (*)     MCHC 32.0      RDW 15.0 (*)     Platelets 185      MPV 10.5      Immature Granulocytes 0.3      Gran # (ANC) 2.6      Immature Grans (Abs) 0.01      Lymph # 0.9 (*)     Mono # 0.4      Eos # 0.1      Baso # 0.03      nRBC 0      Gran % 63.8      Lymph % 21.8      Mono % 10.8      Eosinophil % 2.5      Basophil % 0.8      Differential Method Automated     COMPREHENSIVE METABOLIC PANEL - Abnormal    Sodium 139      Potassium 4.9      Chloride 109      CO2 22 (*)     Glucose 130 (*)     BUN 61 (*)     Creatinine 2.6 (*)     Calcium 10.3      Total Protein 6.8      Albumin 3.2 (*)     Total Bilirubin 0.3      Alkaline Phosphatase 79      AST 24      ALT 21      eGFR 23.7 (*)     Anion Gap 8     TROPONIN I - Abnormal    Troponin I 0.041 (*)    B-TYPE NATRIURETIC PEPTIDE - Abnormal    BNP 1,357 (*)    URINALYSIS, REFLEX TO URINE CULTURE - Abnormal    Specimen UA Urine, Clean Catch      Color, UA Colorless (*)     Appearance, UA Clear      pH, UA 7.0      Specific Gravity, UA 1.005      Protein, UA Negative      Glucose, UA Negative      Ketones, UA Negative      Bilirubin (UA) Negative      Occult Blood UA Negative      Nitrite, UA Negative      Leukocytes,  UA Negative      Narrative:     Specimen Source->Urine   TROPONIN I - Abnormal    Troponin I 0.030 (*)    PROTIME-INR - Abnormal    Prothrombin Time 26.5 (*)     INR 2.6 (*)    TYPE & SCREEN    Group & Rh O NEG      Indirect Justyna NEG      Specimen Outdate 09/29/2024 23:59     POCT GLUCOSE MONITORING CONTINUOUS        ECG Results              EKG 12-lead (Final result)        Collection Time Result Time QRS Duration OHS QTC Calculation    09/26/24 15:27:02 09/26/24 15:32:13 158 488                     Final result by Interface, Lab In [facility] (09/26/24 15:32:22)                   Narrative:    Test Reason : R07.9,    Vent. Rate : 057 BPM     Atrial Rate : 000 BPM     P-R Int : 000 ms          QRS Dur : 158 ms      QT Int : 502 ms       P-R-T Axes : 000 -14 135 degrees     QTc Int : 488 ms    Atrial fibrillation with slow ventricular response  Left bundle branch block  Abnormal ECG  When compared with ECG of 05-AUG-2024 02:39,  T wave inversion now evident in Lateral leads  Confirmed by MELISSA MCCARTY MD ([id number]) on 9/26/2024 3:32:10 PM    Referred By: EVAN   SELF           Confirmed By:MELISSA MCCARTY MD                                  Imaging Results              X-Ray Chest AP Portable (Final result)  Result time 09/26/24 16:56:33      Final result by Lew Gaytan MD (09/26/24 16:56:33)                   Impression:      Cardiomegaly.  No evidence of pulmonary edema.      Electronically signed by: Lew Gaytan MD  Date:    09/26/2024  Time:    16:56               Narrative:    EXAMINATION:  XR CHEST AP PORTABLE    CLINICAL HISTORY:  Chest Pain;    TECHNIQUE:  Single frontal view of the chest was performed.    COMPARISON:  06/12/2024.    FINDINGS:  There are postop changes of median sternotomy.  The sternal wires are intact.  Monitoring EKG leads are present.    The trachea is unremarkable.  There is stable enlargement of the cardiac silhouette.  There is no evidence of free air beneath the hemidiaphragms.   There are no pleural effusions.  There is no evidence of a pneumothorax.  There is no evidence of pneumomediastinum.  No airspace opacity is present.  There are degenerative changes in the osseous structures.                                       Medications   0.9%  NaCl infusion (for blood administration) (has no administration in time range)   sodium chloride 0.9% flush 10 mL (has no administration in time range)   naloxone 0.4 mg/mL injection 0.02 mg (has no administration in time range)   glucose chewable tablet 16 g (has no administration in time range)   glucose chewable tablet 24 g (has no administration in time range)   glucagon (human recombinant) injection 1 mg (has no administration in time range)   acetaminophen tablet 650 mg (has no administration in time range)   ondansetron disintegrating tablet 8 mg (has no administration in time range)   insulin aspart U-100 pen 0-5 Units (has no administration in time range)   dextrose 10% bolus 125 mL 125 mL (has no administration in time range)   dextrose 10% bolus 250 mL 250 mL (has no administration in time range)   furosemide injection 40 mg (has no administration in time range)   allopurinol split tablet 100 mg (has no administration in time range)   ferrous gluconate tablet 324 mg (has no administration in time range)   isosorbide mononitrate 24 hr tablet 60 mg (60 mg Oral Given 9/26/24 2220)   atorvastatin tablet 80 mg (has no administration in time range)   sodium chloride 0.65 % nasal spray 2 spray (2 sprays Each Nostril Not Given 9/27/24 0019)   furosemide injection 40 mg (40 mg Intravenous Given 9/26/24 1707)     Medical Decision Making  Pt presents for anemia and chest pain. Pt well appearing and in no acute distress. Afebrile and hemodynamically stable. BNP 1300(above baseline). Trop is also elevated at 0.041 but overall downtrended. JIM with creatinine of 2.6(baseline around 2) Anemia noted at 6.4. Will treat with lasix and blood transfusion in the  ED. Pt discussed with HM who plan for admission     Amount and/or Complexity of Data Reviewed  Labs: ordered.    Risk  Prescription drug management.                                      Clinical Impression:  Final diagnoses:  [R07.9] Chest pain, unspecified type (Primary)  [I50.9] Congestive heart failure, unspecified HF chronicity, unspecified heart failure type  [N17.9] JIM (acute kidney injury)          ED Disposition Condition    Observation Stable                Guy Bazan Jr., MD  Resident  09/27/24 005

## 2024-09-27 NOTE — ASSESSMENT & PLAN NOTE
Hx CKD stage for with EGFR 23.7. Cr elevated from baseline at 2.6. Likely 2/2 long term DM. Not on any antihyperglycemics. Last A1c 5.3    -CTM Cr  -Renally adjust meds  -Avoid nephrotoxic medications

## 2024-09-28 VITALS
RESPIRATION RATE: 18 BRPM | HEIGHT: 65 IN | DIASTOLIC BLOOD PRESSURE: 58 MMHG | BODY MASS INDEX: 25.33 KG/M2 | WEIGHT: 152 LBS | HEART RATE: 60 BPM | OXYGEN SATURATION: 99 % | TEMPERATURE: 98 F | SYSTOLIC BLOOD PRESSURE: 120 MMHG

## 2024-09-28 LAB
ALBUMIN SERPL BCP-MCNC: 3 G/DL (ref 3.5–5.2)
ALP SERPL-CCNC: 67 U/L (ref 55–135)
ALT SERPL W/O P-5'-P-CCNC: 15 U/L (ref 10–44)
ANION GAP SERPL CALC-SCNC: 9 MMOL/L (ref 8–16)
AST SERPL-CCNC: 18 U/L (ref 10–40)
BASOPHILS # BLD AUTO: 0.03 K/UL (ref 0–0.2)
BASOPHILS NFR BLD: 0.6 % (ref 0–1.9)
BILIRUB SERPL-MCNC: 1 MG/DL (ref 0.1–1)
BUN SERPL-MCNC: 64 MG/DL (ref 8–23)
CALCIUM SERPL-MCNC: 9.9 MG/DL (ref 8.7–10.5)
CHLORIDE SERPL-SCNC: 109 MMOL/L (ref 95–110)
CO2 SERPL-SCNC: 21 MMOL/L (ref 23–29)
CREAT SERPL-MCNC: 2.2 MG/DL (ref 0.5–1.4)
DIFFERENTIAL METHOD BLD: ABNORMAL
EOSINOPHIL # BLD AUTO: 0.2 K/UL (ref 0–0.5)
EOSINOPHIL NFR BLD: 3.3 % (ref 0–8)
ERYTHROCYTE [DISTWIDTH] IN BLOOD BY AUTOMATED COUNT: 15.8 % (ref 11.5–14.5)
EST. GFR  (NO RACE VARIABLE): 29 ML/MIN/1.73 M^2
FERRITIN SERPL-MCNC: 80 NG/ML (ref 20–300)
GLUCOSE SERPL-MCNC: 79 MG/DL (ref 70–110)
HCT VFR BLD AUTO: 24.4 % (ref 40–54)
HGB BLD-MCNC: 7.7 G/DL (ref 14–18)
IMM GRANULOCYTES # BLD AUTO: 0.02 K/UL (ref 0–0.04)
IMM GRANULOCYTES NFR BLD AUTO: 0.4 % (ref 0–0.5)
INR PPP: 1.8 (ref 0.8–1.2)
IRON SERPL-MCNC: 58 UG/DL (ref 45–160)
LYMPHOCYTES # BLD AUTO: 1.5 K/UL (ref 1–4.8)
LYMPHOCYTES NFR BLD: 28.9 % (ref 18–48)
MAGNESIUM SERPL-MCNC: 2.1 MG/DL (ref 1.6–2.6)
MCH RBC QN AUTO: 29.6 PG (ref 27–31)
MCHC RBC AUTO-ENTMCNC: 31.6 G/DL (ref 32–36)
MCV RBC AUTO: 94 FL (ref 82–98)
MONOCYTES # BLD AUTO: 0.8 K/UL (ref 0.3–1)
MONOCYTES NFR BLD: 14.7 % (ref 4–15)
NEUTROPHILS # BLD AUTO: 2.7 K/UL (ref 1.8–7.7)
NEUTROPHILS NFR BLD: 52.1 % (ref 38–73)
NRBC BLD-RTO: 0 /100 WBC
PHOSPHATE SERPL-MCNC: 3.5 MG/DL (ref 2.7–4.5)
PLATELET # BLD AUTO: 183 K/UL (ref 150–450)
PMV BLD AUTO: 10.8 FL (ref 9.2–12.9)
POCT GLUCOSE: 151 MG/DL (ref 70–110)
POCT GLUCOSE: 85 MG/DL (ref 70–110)
POCT GLUCOSE: 87 MG/DL (ref 70–110)
POTASSIUM SERPL-SCNC: 4 MMOL/L (ref 3.5–5.1)
PROT SERPL-MCNC: 6.3 G/DL (ref 6–8.4)
PROTHROMBIN TIME: 19.4 SEC (ref 9–12.5)
RBC # BLD AUTO: 2.6 M/UL (ref 4.6–6.2)
SATURATED IRON: 18 % (ref 20–50)
SODIUM SERPL-SCNC: 139 MMOL/L (ref 136–145)
TOTAL IRON BINDING CAPACITY: 318 UG/DL (ref 250–450)
TRANSFERRIN SERPL-MCNC: 215 MG/DL (ref 200–375)
WBC # BLD AUTO: 5.16 K/UL (ref 3.9–12.7)

## 2024-09-28 PROCEDURE — 83540 ASSAY OF IRON: CPT | Mod: HCNC

## 2024-09-28 PROCEDURE — 82728 ASSAY OF FERRITIN: CPT | Mod: HCNC

## 2024-09-28 PROCEDURE — 25000003 PHARM REV CODE 250: Mod: HCNC

## 2024-09-28 PROCEDURE — 84100 ASSAY OF PHOSPHORUS: CPT | Mod: HCNC

## 2024-09-28 PROCEDURE — 85610 PROTHROMBIN TIME: CPT | Mod: HCNC

## 2024-09-28 PROCEDURE — 63600175 PHARM REV CODE 636 W HCPCS: Mod: HCNC

## 2024-09-28 PROCEDURE — 85025 COMPLETE CBC W/AUTO DIFF WBC: CPT | Mod: HCNC

## 2024-09-28 PROCEDURE — 36415 COLL VENOUS BLD VENIPUNCTURE: CPT | Mod: HCNC

## 2024-09-28 PROCEDURE — 80053 COMPREHEN METABOLIC PANEL: CPT | Mod: HCNC

## 2024-09-28 PROCEDURE — 83735 ASSAY OF MAGNESIUM: CPT | Mod: HCNC

## 2024-09-28 RX ADMIN — OXYMETAZOLINE HYDROCHLORIDE 2 SPRAY: 0.05 SPRAY NASAL at 08:09

## 2024-09-28 RX ADMIN — NASAL 2 SPRAY: 6.5 SPRAY NASAL at 08:09

## 2024-09-28 RX ADMIN — FUROSEMIDE 40 MG: 10 INJECTION, SOLUTION INTRAVENOUS at 10:09

## 2024-09-28 RX ADMIN — ATORVASTATIN CALCIUM 80 MG: 20 TABLET, FILM COATED ORAL at 08:09

## 2024-09-28 RX ADMIN — ALLOPURINOL 100 MG: 300 TABLET ORAL at 08:09

## 2024-09-28 NOTE — ASSESSMENT & PLAN NOTE
Pt on coumadin for AVR. Currently with anemia likely 2/2 epistaxis. INR 2.6    -Daily INR  -restarted coumadin, anemia controlled

## 2024-09-28 NOTE — DISCHARGE SUMMARY
[facility] - Cardiology [facility] Medicine  Discharge Summary      Patient Name: Dariel Urbina  MRN: [id number]  EMELIA: [id number]  Patient Class: IP- Inpatient  Admission Date: 9/26/2024  Hospital Length of Stay: 1 days  Discharge Date and Time:  09/28/2024 1:19 PM  Attending Physician: Inga Muir*   Discharging Provider: Akua No MD  Primary Care Provider: Devon Langston Jr., MD  [facility] Medicine Team: [place] [id number] Akua No MD  Primary Care Team: [place] [id number]    HPI:   Dariel Urbina is a 83 year old male with a PMHx of CHF, CKD stage 4, CAD s/p CABG (1990s), mechanical AVR (on warfarin), moderate-severe MR, recurrent epistaxis, anemia, NSTEMI, PVD, recurrent epistaxis and type 2 diabetes presenting to the ED for low hgb and chest pain.     Patient has been having recurrent episodes of epistaxis for the past few months and has been followed by ENT. He previously had undergone bilateral SP ligation followed by bilateral embolization, cautery on the left septum for persistent bleeding on 11/03/2023 and again recently on 12/18/23. He recently underwent left AEA ligation on 2/28/24 and recently underwent nasal endoscopy and control of epistaxis on 6/18/24. He then had repeat embolization on 08/06/24. These procedures have not fully controlled epistaxis. Patient also on coumadin for an aortic valve replacement for which he was undergoing H/H. It was found that his Hgb was 6.5. Patient was called and recommended to come into the ED for further evaluation and pRBC.     Patient has noted increased dizziness and lightheadedness for the past few days worse than baseline. It has not affected his ADLs however he noted that he has had to be more careful performing daily tasks. Patient also with complaints of chest pain and shortness of breath. He states that his chest pain typically occurs on exertion after waling appx 10-20 steps after resting. These symptoms go away at rest. Patient  with hx CAD on IMDUR at home which he mentions helps with his chest pain, however he has noticed his shortness of breath has been mildly worsened.    In the ED, VSS. Labs significant for Hgb 6.4, Cr 2.6 (BL 1.7-2.2, consistently elevated on previous admissions), BNP 1357, troponin 0.041. CXR without evidence of pulmonary edema. Patient given 1 U pRBC and 40 mg IV lasix and admitted to hospital medicine for further workup and evaluation      * No surgery found *      Hospital Course:   83M with hx AVR on coumadin, recurrent epistaxis followed by ENT with multiple procedures sent to ED after routine lab work noted low Hb (6.5). Patient did report symptoms of SOB, fatigue, daily epistaixs.  Also with reported chest pain on exertion and dyspnea on exertion. Also with JIM, elevated BNP concerning for CHF exacerbation. Given lasix and 1 uPRBC on admission. Improved shortness of breath and chest pain stable. EKG stable without acute ischemic findings. Troponin trended to peak. On 9/27 patient with episode of prolonged epistaxis. ENT consulted and packed tissue with fibrillin. Hemoglobin stable, has not had nose bleed since packing. Discharge today with referral to OP cardiology for heart failure management.      Goals of Care Treatment Preferences:  Code Status: Full Code          What is most important right now is to focus on remaining as independent as possible, symptom/pain control, extending life as long as possible, even it it means sacrificing quality.  Accordingly, we have decided that the best plan to meet the patient's goals includes continuing with treatment.      SDOH Screening:  The patient was screened for utility difficulties, food insecurity, transport difficulties, housing insecurity, and interpersonal safety and there were no concerns identified this admission.     Consults:   Consults (From admission, onward)          Status Ordering Provider     Inpatient consult to ENT  Once        Provider:  (Not yet  assigned)    Completed TALHA BENNETT            ENT  Recurrent epistaxis  Pt presenting to [facility] with recurrent epistaxis, follows closely with outpatient ENT. Hgb 6.4 on presentation. Patient currently denying epistaxis on 9/26 but with recent episodes.    -CTM daily CBC  -Nasal saline  -Nasal packing as needed  -ENT consulted for epistaxis, appreciate assistance      -S/p fibrillin packing  -S/p 1 [facility]      Cardiac/Vascular  Paroxysmal atrial fibrillation  Patient with Paroxysmal (<7 days) atrial fibrillation which is controlled currently with Beta Blocker. Patient is currently in sinus rhythm.Anticoagulation indicated. Anticoagulation done with coumadin . Currently denies palpitations    -restarted coumadin   -Hold BB in setting of bradycardia, can consider restarting if HR increases    Acute exacerbation of CHF (congestive heart failure)  Pt with hx HFmrEF with last TTE 5/30/2024 with EF 50-55%, severe dilation of left and right atrium, mechanical aortic valve, severe MR. GDMT includes BB and bumex at home. Complaints of dyspnea on exertion and shortness of breath. Denies orthopnea, LE edema, PND. BNP 1,357 on admission, CXR without overt pulmonary edema on review. S/p 40 mg IV lasix in ED. Repeat TTE on 9/27 with EF 45-50%, G3DD with abnormal wall motion noted    - Continue lasix 40 mg qd  -Strict I/Os  -CTM BMP daily  -Daily weights  -CTM electrolytes and adjust      Coronary artery disease involving native coronary artery of native heart without angina pectoris  Pt with known CAD s/p CABG, on BB, Statin at home. Not on ASA per chart review.     -Hold BB in setting of bradycardia  -Continue statin  -Continue home IMDUR  -Continuous tele  -CTM CMP  -Trend troponin to peak    Hyperlipidemia associated with type 2 diabetes mellitus  On crestor 40 mg at home. Last lipid panel with well controlled LDL    -Continue atorvastatin 80 mg qd in placement of crestor, resume home meds at discharge      Renal/  Chronic  kidney disease, stage 4 (severe)  Creatine stable for now. BMP reviewed- noted Estimated Creatinine Clearance: 22.1 mL/min (A) (based on SCr of 2.2 mg/dL (H)). according to latest data. 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  Monitor UOP and serial BMP and adjust therapy as needed. Renally dose meds. Avoid nephrotoxic medications and procedures.    -Renally adjust medications  -Avoid nephrotoxic medications    Hematology  Chronic anticoagulation  Pt on coumadin for AVR. Currently with anemia likely 2/2 epistaxis. INR 2.6    -Daily INR  -restarted coumadin, anemia controlled         Oncology  * Anemia  Patient with history of recurrent epistaxis requiring multiple ED visits presenting to ED for Hgb 6.5 on routine H/H for monitoring of coumadin. Currently with symptoms of dizziness and lightheadedness and shortness of breath. Etiology of anemia likely 2/2 recurrent epistaxis. Has been seen by ENT with multiple procedures without success in controlling epistaxis    -S/p 1 uPRB  -CTM CBC qd  -Goal Hgb >7  -Hgb improved, restarted warfarin   -ENT consulted in setting of recurrent epistaxis, packed nose with fibrillin. Packing care instructions given.     Endocrine  Type 2 diabetes mellitus with stage 4 chronic kidney disease, without long-term current use of insulin  Hx CKD stage for with EGFR 23.7. Cr elevated from baseline at 2.6. Likely 2/2 long term DM. Not on any antihyperglycemics. Last A1c 5.3    -CTM Cr  -Renally adjust meds  -Avoid nephrotoxic medications        Final Active Diagnoses:    Diagnosis Date Noted POA    PRINCIPAL PROBLEM:  Anemia [D64.9] 02/03/2022 Yes    Chronic kidney disease, stage 4 (severe) [N18.4] 06/12/2024 Yes    Paroxysmal atrial fibrillation [I48.0] 02/06/2023 Yes    Acute exacerbation of CHF (congestive heart failure) [I50.9] 12/30/2022 Yes    Coronary artery disease involving native coronary artery of native heart without angina pectoris [I25.10] 12/29/2022 Yes     Recurrent epistaxis [R04.0] 12/21/2022 Yes    JIM (acute kidney injury) [N17.9] 12/21/2022 Yes    Epistaxis [R04.0] 03/21/2018 Yes    Type 2 diabetes mellitus with stage 4 chronic kidney disease, without long-term current use of insulin [E11.22, N18.4] 10/02/2013 Yes     Chronic    Hyperlipidemia associated with type 2 diabetes mellitus [E11.69, E78.5] 09/26/2012 Yes    Chronic anticoagulation [Z79.01] 09/26/2012 Not Applicable      Problems Resolved During this Admission:       Discharged Condition: good    Disposition: Home or Self Care    Follow Up:   Follow-up Information       Devon Langston Jr., MD Follow up on 9/27/2024.    Specialty: Internal Medicine  Why: Provider's  Ramo stated there are no available appointments within a week. She will message provider and call patient with scheduled follow up appointment.  Contact information:  [address] CASTRO [facility] [id number] [phone]                           Patient Instructions:      Ambulatory referral/consult to Cardiology   Standing Status: Future   Referral Priority: Urgent Referral Type: Consultation   Referral Reason: Specialty Services Required   Requested Specialty: Cardiology   Number of Visits Requested: 1     Ambulatory referral/consult to Heart Failure Transitional Care Clinic   Standing Status: Future   Referral Priority: Urgent Referral Type: Consultation   Referral Reason: Specialty Services Required   Requested Specialty: Cardiology   Number of Visits Requested: 1     Diet Adult Regular     Activity as tolerated       Significant Diagnostic Studies: Labs: CBC   Recent Labs   Lab 09/27/24  0528 09/27/24  1327 09/28/24  0306   WBC 8.93 5.46 5.16   HGB 8.1* 8.0* 7.7*   HCT 25.4* 25.7* 24.4*    184 183       Pending Diagnostic Studies:       None           Medications:  Reconciled Home Medications:      Medication List        CHANGE how you take these medications      warfarin 5 MG tablet  Commonly known as: COUMADIN  TAKE  1/2 TABLET (2.5MG) SATURDAY, THEN TAKE 1 TABLET (5MG) ALL OTHER DAYS OR AS INSTRUCTED BY COUMADIN CLINIC  What changed:   how much to take  how to take this  when to take this  additional instructions            CONTINUE taking these medications      acetaminophen 500 MG tablet  Commonly known as: TYLENOL  Take 500 mg by mouth daily as needed for Pain.     allopurinoL 100 MG tablet  Commonly known as: ZYLOPRIM  Take 1 tablet (100 mg total) by mouth once daily.     bumetanide 1 MG tablet  Commonly known as: BUMEX  Take 2 tablets (2 mg total) by mouth every other day.     DEEP SEA NASAL 0.65 % nasal spray  Generic drug: sodium chloride  Use 2 sprays by Nasal route every 4 hours. Apply into nasal cavities daily with nightly application of vaseline/aquaphor to anterior nares. Keep head of bed elevated.     ferrous gluconate 324 MG tablet  Commonly known as: FERGON  TAKE 1 TABLET EVERY DAY WITH BREAKFAST     fish oil-omega-3 fatty acids 300-1,000 mg capsule  Take 1 capsule by mouth once daily.     isosorbide mononitrate 60 MG 24 hr tablet  Commonly known as: IMDUR  Take 1 tablet (60 mg total) by mouth every evening.     oxymetazoline 0.05 % nasal spray  Commonly known as: AFRIN  2 sprays by Nasal route as needed for Congestion (nose bleeds).     rosuvastatin 40 MG Tab  Commonly known as: CRESTOR  TAKE 1 TABLET EVERY EVENING.     traZODone 50 MG tablet  Commonly known as: DESYREL  Take 1 tablet (50 mg total) by mouth every evening.            STOP taking these medications      metoprolol succinate 25 MG 24 hr tablet  Commonly known as: TOPROL-XL              Indwelling Lines/Drains at time of discharge:   Lines/Drains/Airways       None                   Time spent on the discharge of patient: 30 minutes         Akua No MD  Department of Hospital Medicine  [facility] - Cardiology Stepdown

## 2024-09-28 NOTE — ASSESSMENT & PLAN NOTE
Creatine stable for now. BMP reviewed- noted Estimated Creatinine Clearance: 22.1 mL/min (A) (based on SCr of 2.2 mg/dL (H)). according to latest data. 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  Monitor UOP and serial BMP and adjust therapy as needed. Renally dose meds. Avoid nephrotoxic medications and procedures.    -Renally adjust medications  -Avoid nephrotoxic medications

## 2024-09-28 NOTE — PLAN OF CARE
VSS. BG monitored. Strict I & O. Pt educated on fall risk and remained free from falls/trauma/injury. Denies chest pain, SOB, palpitations, dizziness, pain, or discomfort. Plan of care reviewed with pt, all questions answered. Bed locked in lowest position, call bell within reach, no acute distress noted, will continue to monitor.

## 2024-09-28 NOTE — ASSESSMENT & PLAN NOTE
Pt presenting to [facility] with recurrent epistaxis, follows closely with outpatient ENT. Hgb 6.4 on presentation. Patient currently denying epistaxis on 9/26 but with recent episodes.    -CTM daily CBC  -Nasal saline  -Nasal packing as needed  -ENT consulted for epistaxis, appreciate assistance      -S/p fibrillin packing  -S/p 1 uPRBC

## 2024-09-28 NOTE — PLAN OF CARE
Pt maintained free from falls/trauma/injuries. Pt denied pain or discomfort. Plan of care reviewed. Pt verbalized understanding. All questions and concerns addressed. VSS with call light and belongings within reach. Pt with no complaints at this time.    Problem: Adult Inpatient Plan of Care  Goal: Plan of Care Review  Outcome: Progressing  Goal: Patient-Specific Goal (Individualized)  Outcome: Progressing  Goal: Absence of Hospital-Acquired Illness or Injury  Outcome: Progressing     Problem: Fall Injury Risk  Goal: Absence of Fall and Fall-Related Injury  Outcome: Progressing     Problem: Diabetes Comorbidity  Goal: Blood Glucose Level Within Targeted Range  Outcome: Progressing     Problem: Acute Kidney Injury/Impairment  Goal: Fluid and Electrolyte Balance  Outcome: Progressing  Goal: Improved Oral Intake  Outcome: Progressing

## 2024-09-28 NOTE — ASSESSMENT & PLAN NOTE
Patient with history of recurrent epistaxis requiring multiple ED visits presenting to ED for Hgb 6.5 on routine H/H for monitoring of coumadin. Currently with symptoms of dizziness and lightheadedness and shortness of breath. Etiology of anemia likely 2/2 recurrent epistaxis. Has been seen by ENT with multiple procedures without success in controlling epistaxis    -S/p 1 uPRB  -CTM CBC qd  -Goal Hgb >7  -Hgb improved, restarted warfarin   -ENT consulted in setting of recurrent epistaxis, packed nose with fibrillin. Packing care instructions given.

## 2024-09-28 NOTE — PLAN OF CARE
Future Appointments   Date Time Provider Department Center   10/2/2024  7:00 AM LAB, APPOINTMENT [facility] INTMED [facility] LAB IM [facility]val PCW   10/7/2024  3:40 PM Sameer Espitia MD [facility] CARDIO [facility]   10/25/2024  2:00 PM Jono Russell MD [facility] HNSO [facility]         Gen Cards appointment scheduled. Prev Cardiologist booked, patient scheduled with another provider.        KELLY Wang  Case Management  [phone]

## 2024-09-28 NOTE — ASSESSMENT & PLAN NOTE
Patient with Paroxysmal (<7 days) atrial fibrillation which is controlled currently with Beta Blocker. Patient is currently in sinus rhythm.Anticoagulation indicated. Anticoagulation done with coumadin . Currently denies palpitations    -restarted coumadin   -Hold BB in setting of bradycardia, can consider restarting if HR increases

## 2024-09-28 NOTE — PLAN OF CARE
[facility] - Cardiology Stepdown  Discharge Final Note    Primary Care Provider: Devon Langston Jr., MD    Expected Discharge Date: 9/28/2024    Final Discharge Note (most recent)       Final Note - 09/28/24 1817          Final Note    Assessment Type Final Discharge Note (P)      Anticipated Discharge Disposition Home or Self Care (P)      What phone number can be called within the next 1-3 days to see how you are doing after discharge? [phone] (P)      Hospital Resources/Appts/Education Provided Provided patient/caregiver with written discharge plan information;Provided education on problems/symptoms using teachback;Appointments scheduled and added to AVS (P)         Post-Acute Status    Post-Acute Authorization HME (P)      HME Status Set-up Complete/Auth obtained (P)    [facility]    Discharge Delays None known at this time (P)                      Important Message from Medicare             Contact Info       Devon Langston Jr., MD   Specialty: Internal Medicine   Relationship: PCP - General    [address]  [facility] [id number]   Phone: [phone]       Next Steps: Follow up on 9/27/2024    Instructions: Provider's  Ramo stated there are no available appointments within a week. She will message provider and call patient with scheduled follow up appointment.          Pt. discharge home. Pt. was receiving home health services prior to hospital admission. Noted, no HME.     Faviola Vines LMSW

## 2024-09-28 NOTE — NURSING
Patient is ready for discharge. Patient stable alert and oriented. IV and telemetry removed. No complaints of pain. Discussed discharge plan. Reviewed medications and side effects, appointments, and answered questions with patient and family.

## 2024-09-30 ENCOUNTER — DOCUMENTATION ONLY (OUTPATIENT)
Dept: CARDIOLOGY | Facility: CLINIC | Age: 84
End: 2024-09-30
Payer: MEDICARE

## 2024-09-30 NOTE — PROGRESS NOTES
Heart Failure Transitional Care Clinic (HFTCC) Team notified of pt referral via Ambulatory Referral to Heart Failure Transitional Care ([id number]).    Patient screened today by provider and RN for enrollment to program.      Pt was deemed not a candidate for enrollment at this time related to patient primary presenting complaint would not benefit from Heart Failure Transitional Care Program.  Recurrent epistaxis with anemia    Pt will require additional follow up planning per primary team.     If pt status, diagnosis, or treatment plan changes , please place AMB referral to Heart Failure Transitional Care Clinic ([id number]) for HFTCC enrollment re-evalution.

## 2024-10-01 ENCOUNTER — PATIENT OUTREACH (OUTPATIENT)
Dept: ADMINISTRATIVE | Facility: CLINIC | Age: 84
End: 2024-10-01
Payer: MEDICARE

## 2024-10-01 NOTE — PROGRESS NOTES
C3 nurse attempted to contact Dariel Urbina  for a TCC post hospital discharge follow up call. No answer. Left voicemail with callback information. The patient does not have a scheduled HOSFU appointment. Message sent to PCP staff for assistance with scheduling visit with patient.

## 2024-10-02 ENCOUNTER — LAB VISIT (OUTPATIENT)
Dept: LAB | Facility: HOSPITAL | Age: 84
End: 2024-10-02
Attending: INTERNAL MEDICINE
Payer: MEDICARE

## 2024-10-02 ENCOUNTER — ANTI-COAG VISIT (OUTPATIENT)
Dept: CARDIOLOGY | Facility: CLINIC | Age: 84
End: 2024-10-02
Payer: MEDICARE

## 2024-10-02 DIAGNOSIS — Z79.01 LONG TERM (CURRENT) USE OF ANTICOAGULANTS: ICD-10-CM

## 2024-10-02 DIAGNOSIS — Z79.01 CHRONIC ANTICOAGULATION: Primary | ICD-10-CM

## 2024-10-02 DIAGNOSIS — Z95.2 H/O MECHANICAL AORTIC VALVE REPLACEMENT: ICD-10-CM

## 2024-10-02 LAB
INR PPP: 1.5 (ref 0.8–1.2)
PROTHROMBIN TIME: 16.2 SEC (ref 9–12.5)

## 2024-10-02 PROCEDURE — 93793 ANTICOAG MGMT PT WARFARIN: CPT | Mod: S$GLB,,,

## 2024-10-02 PROCEDURE — 85610 PROTHROMBIN TIME: CPT | Mod: HCNC | Performed by: INTERNAL MEDICINE

## 2024-10-02 PROCEDURE — 36415 COLL VENOUS BLD VENIPUNCTURE: CPT | Mod: HCNC | Performed by: INTERNAL MEDICINE

## 2024-10-02 NOTE — PROGRESS NOTES
[facility] Anticoagulation Management Program    10/02/2024 12:06 PM    Assessment/Plan:    Patient presents today with subtherapeutic  INR.    Assessment of patient findings and chart review: Reviewed    Recommendation for patient's warfarin regimen: Per calendar     Recommend repeat INR Monday w/ upcoming appt   _________________________________________________________________    Dariel Osorio Tonyajean (84 y.o.) is followed by the [facility] Anticoagulation Management Program.    Anticoagulation Summary  As of 10/2/2024      INR goal:  2.0-3.0   TTR:  67.5% (12 y)   INR used for dosin.5 (10/2/2024)   Warfarin maintenance plan:  2.5 mg (5 mg x 0.5) every Wed, Fri; 5 mg (5 mg x 1) all other days   Weekly warfarin total:  30 mg   Plan last modified:  Laurie Patino, PharmD (2024)   Next INR check:  10/7/2024   Target end date:  --    Indications    Chronic anticoagulation [Z79.01]  H/O mechanical aortic valve replacement [Z95.2]                 Anticoagulation Episode Summary       INR check location:  Clinic Lab    Preferred lab:  --    Send INR reminders to:  [facility] COUMADIN MONITORING POOL    Comments:  [facility] IM - Internal Med Clinic only Mon - Thu // carpel tunnel release  - target low end of range          Anticoagulation Care Providers       Provider Role Specialty Phone number    Devon Garnica MD [facility] Cardiology [phone]

## 2024-10-02 NOTE — PROGRESS NOTES
C3 nurse spoke with Dariel Urbina's wife, Ameena, for a TCC post hospital discharge follow up call. The patient does not have a scheduled HOSFU appointment with Devon Langston Jr, MD (PCP) within 5-7 days post hospital discharge date 9/28/24. C3 nurse was unable to schedule HOSFU appointment in [facility].    Message previously sent to PCP staff requesting they contact patient and schedule follow up appointment.    The patient does have a follow up appointment with Sameer Espitia MD (cardiology) on 10/7/24 @ 3:40pm.     The patient had to cancel a new patient appointment that was scheduled with Mabel Birmingham MD (nephrology) on 9/19/24, but per his wife, the patient does not wish to travel to [facility] for this, therefore his wife has not attempted to reschedule this appointment at this time. She will discuss with PCP and determine if this is still needed at this time.

## 2024-10-07 ENCOUNTER — OFFICE VISIT (OUTPATIENT)
Dept: CARDIOLOGY | Facility: CLINIC | Age: 84
End: 2024-10-07
Payer: MEDICARE

## 2024-10-07 VITALS
WEIGHT: 154.19 LBS | HEART RATE: 60 BPM | BODY MASS INDEX: 25.69 KG/M2 | OXYGEN SATURATION: 100 % | HEIGHT: 65 IN | SYSTOLIC BLOOD PRESSURE: 112 MMHG | DIASTOLIC BLOOD PRESSURE: 47 MMHG

## 2024-10-07 DIAGNOSIS — N18.32 STAGE 3B CHRONIC KIDNEY DISEASE: ICD-10-CM

## 2024-10-07 DIAGNOSIS — Z95.2 H/O MECHANICAL AORTIC VALVE REPLACEMENT: ICD-10-CM

## 2024-10-07 DIAGNOSIS — R04.0 RECURRENT EPISTAXIS: ICD-10-CM

## 2024-10-07 DIAGNOSIS — Z79.01 CHRONIC ANTICOAGULATION: Primary | ICD-10-CM

## 2024-10-07 DIAGNOSIS — D64.9 ANEMIA, UNSPECIFIED TYPE: ICD-10-CM

## 2024-10-07 DIAGNOSIS — I50.9 CONGESTIVE HEART FAILURE, UNSPECIFIED HF CHRONICITY, UNSPECIFIED HEART FAILURE TYPE: ICD-10-CM

## 2024-10-07 PROCEDURE — 99999 PR PBB SHADOW E&M-EST. PATIENT-LVL III: CPT | Mod: PBBFAC,HCNC,, | Performed by: INTERNAL MEDICINE

## 2024-10-08 ENCOUNTER — ANTI-COAG VISIT (OUTPATIENT)
Dept: CARDIOLOGY | Facility: CLINIC | Age: 84
End: 2024-10-08
Payer: MEDICARE

## 2024-10-08 ENCOUNTER — LAB VISIT (OUTPATIENT)
Dept: LAB | Facility: HOSPITAL | Age: 84
End: 2024-10-08
Attending: INTERNAL MEDICINE
Payer: MEDICARE

## 2024-10-08 ENCOUNTER — OFFICE VISIT (OUTPATIENT)
Dept: INTERNAL MEDICINE | Facility: CLINIC | Age: 84
End: 2024-10-08
Payer: MEDICARE

## 2024-10-08 VITALS
HEART RATE: 64 BPM | DIASTOLIC BLOOD PRESSURE: 60 MMHG | SYSTOLIC BLOOD PRESSURE: 130 MMHG | OXYGEN SATURATION: 98 % | WEIGHT: 155 LBS | HEIGHT: 65 IN | BODY MASS INDEX: 25.83 KG/M2

## 2024-10-08 DIAGNOSIS — Z79.01 CHRONIC ANTICOAGULATION: Primary | ICD-10-CM

## 2024-10-08 DIAGNOSIS — Z79.01 LONG TERM (CURRENT) USE OF ANTICOAGULANTS: ICD-10-CM

## 2024-10-08 DIAGNOSIS — I50.42 CHRONIC COMBINED SYSTOLIC AND DIASTOLIC HEART FAILURE: ICD-10-CM

## 2024-10-08 DIAGNOSIS — N18.32 STAGE 3B CHRONIC KIDNEY DISEASE: Chronic | ICD-10-CM

## 2024-10-08 DIAGNOSIS — R04.0 EPISTAXIS: Primary | ICD-10-CM

## 2024-10-08 DIAGNOSIS — Z95.2 H/O MECHANICAL AORTIC VALVE REPLACEMENT: ICD-10-CM

## 2024-10-08 LAB
INR PPP: 1.4 (ref 0.8–1.2)
PROTHROMBIN TIME: 14.9 SEC (ref 9–12.5)

## 2024-10-08 PROCEDURE — 36415 COLL VENOUS BLD VENIPUNCTURE: CPT | Mod: HCNC | Performed by: INTERNAL MEDICINE

## 2024-10-08 PROCEDURE — 93793 ANTICOAG MGMT PT WARFARIN: CPT | Mod: S$GLB,,,

## 2024-10-08 PROCEDURE — 85610 PROTHROMBIN TIME: CPT | Mod: HCNC | Performed by: INTERNAL MEDICINE

## 2024-10-08 PROCEDURE — 99999 PR PBB SHADOW E&M-EST. PATIENT-LVL IV: CPT | Mod: PBBFAC,HCNC,, | Performed by: NURSE PRACTITIONER

## 2024-10-08 NOTE — PROGRESS NOTES
Subjective:   @Patient ID:  Dariel Urbina is a 84 y.o. male who presents for follow-up of No chief complaint on file.      HPI:     Patient is a very pleasant 84-year-old gentleman with extensive cardiac history, has had history of CABG in 1990s along with mechanical aortic valve replacement done by Dr. 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 Martins in Cardiology, has had multiple admissions to the hospital and had several procedures for epistaxis including most recently having embolization of branches of bilateral maxillary arteries, now here to establish care with a new cardiologist.  Patient wants to see me at [facility] that is closer to him.      He is accompanied by his wife.  He had another episode of large epistaxis.  He is scheduled to see his ENT doctor later this week, last hemoglobin of 7.7 creatinine of 2.2.  He was recently admitted to the hospital with hemoglobin of 6.5 requiring PRBC transfusion and embolization of branches of the maxillary arteries.  Patient had chest pain and troponin elevation at that time likely from demand supply mismatch.  Currently not on aspirin therapy.  We have discussed significant anemia as likely the culprit for majority of his clinical issues.  He does have some claudication symptoms in bilateral lower extremity.   Most recently had echocardiogram in September 24 that showed EF of 45%, grade 3 diastolic dysfunction, moderate-to-severe mitral regurgitation, he was previously evaluated for mitral clip procedure but KIRSTEN showed mild-to-moderate mitral regurgitation.    Results for orders placed during the hospital encounter of 09/26/24    Echo    Interpretation Summary    Left Ventricle: The left ventricle is normal in size. Mildly increased wall thickness. Mild septal thickening. There is concentric remodeling. Regional wall motion abnormalities present. Septal motion is abnormal. There is mildly reduced systolic function with a visually estimated ejection fraction of 40 - 45%. Ejection fraction by visual approximation is 43%. Grade III diastolic dysfunction.    Right Ventricle: Mild right ventricular enlargement. Wall thickness is normal. Systolic function is borderline low.    Left Atrium: Left atrium is severely dilated.    Right Atrium: Right atrium is moderately dilated.    Aortic Valve: There is a mechanical valve in the aortic position. There is moderate stenosis. Aortic valve area by VTI is 1.30 cm2. Aortic valve peak velocity is 2.84 m/s. Mean gradient is 20 mmHg. The dimensionless index is 0.50.    Mitral Valve: There is moderate bileaflet sclerosis. There is mild mitral annular calcification present. There is moderate to severe regurgitation.    Tricuspid Valve: There is moderate to severe regurgitation.    Pulmonic Valve: There is mild regurgitation.    Pulmonary Artery: There is severe pulmonary hypertension. The estimated pulmonary artery systolic pressure is 79 mmHg.    IVC/SVC: Intermediate venous pressure at 8 mmHg.      No results found for this or any previous visit.      Results for orders placed during the hospital encounter of 04/11/23    Intra-Procedure Documentation    Narrative  KIRSTEN performed in the Invasive Lab  - See Procedure Log link below for nursing documentation  - See KIRSTEN order on Card Proc Tab  for physician findings      Please document below the medical necessity for continuous telemetry monitoring or discontinue the current order if appropriate.    Current rhythm from flowsheet:               Patient Active Problem List    Diagnosis Date Noted    Anemia of chronic renal failure 07/08/2024    Hyperuricemia 07/08/2024    Nephrolithiasis 07/08/2024    Chronic kidney disease, stage 4 (severe) 06/12/2024     Noted by MELODIE BARRAGAN MD II last documented on [id number]      ACP (advance care planning) 05/30/2024    Dysphonia 01/18/2024    Secondary hyperparathyroidism of renal origin 01/03/2024    Dysphagia 11/21/2023    Mitral insufficiency 03/22/2023    Paroxysmal atrial fibrillation 02/06/2023    Acute exacerbation of CHF (congestive heart failure) 12/30/2022    Coronary artery disease involving native coronary artery of native heart without angina pectoris 12/29/2022    Acute blood loss anemia 12/21/2022    Recurrent epistaxis 12/21/2022    Supratherapeutic INR 12/21/2022    JIM (acute kidney injury) 12/21/2022    Anemia 02/03/2022    Severe carpal tunnel syndrome of right wrist 05/19/2020    Bilateral carpal tunnel syndrome 04/30/2020    Numbness and tingling in both hands 04/30/2020    Hypertension associated with diabetes 09/25/2019    Hx of colonic polyp 07/28/2018    Gastrointestinal hemorrhage associated with intestinal diverticulosis 04/01/2018     4-3-18 C-scope  - Hemorrhoids found on perianal exam.   - Blood in the rectum and in the sigmoid colon.  - Patent end-to-end colo-colonic anastomosis,  characterized by healthy appearing mucosa.  - Diverticulosis in the sigmoid colon.   - Diverticulosis in the sigmoid colon. There was  active bleeding coming from the diverticular      opening. Clip (MR conditional) was placed.   - One 4 mm polyp in the descending colon. Resection   not attempted.   - One 10 mm polyp at the splenic flexure. Resection  not attempted. Tattooed.      Epistaxis 03/21/2018     Hyperkalemia 03/20/2018    Acidosis 03/20/2018    Stage 3b chronic kidney disease 05/27/2015    Type 2 diabetes mellitus with diabetic peripheral angiopathy without gangrene 05/27/2015    H/O mechanical aortic valve replacement 09/17/2014    Atherosclerosis of native artery of extremity with intermittent claudication 09/17/2014    Type 2 diabetes mellitus with stage 4 chronic kidney disease, without long-term current use of insulin 10/02/2013    Chronic anticoagulation 09/26/2012    Hyperlipidemia associated with type 2 diabetes mellitus 09/26/2012    History of colon resection 09/26/2012    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    History of gout 09/26/2012                    LAST HbA1c  Lab Results   Component Value Date    HGBA1C 5.3 05/30/2024       Lipid panel  Lab Results   Component Value Date    CHOL 96 (L) 05/29/2024    CHOL 115 (L) 02/28/2023    CHOL 103 (L) 02/10/2022     Lab Results   Component Value Date    HDL 31 (L) 05/29/2024    HDL 37 (L) 02/28/2023    HDL 34 (L) 02/10/2022     Lab Results   Component Value Date    LDLCALC 36.2 (L) 05/29/2024    LDLCALC 47.0 (L) 02/28/2023    LDLCALC 43.4 (L) 02/10/2022     Lab Results   Component Value Date    TRIG 144 05/29/2024    TRIG 155 (H) 02/28/2023    TRIG 128 02/10/2022     Lab Results   Component Value Date    CHOLHDL 32.3 05/29/2024    CHOLHDL 32.2 02/28/2023    CHOLHDL 33.0 02/10/2022            Review of Systems   Constitutional: Negative for chills and fever.   HENT:  Positive for nosebleeds. Negative for hearing loss.    Eyes:  Negative for blurred vision.   Cardiovascular:  Positive for chest pain and claudication. Negative for leg swelling and palpitations.   Respiratory:  Positive for shortness of breath. Negative for hemoptysis.    Hematologic/Lymphatic: Negative for bleeding problem.   Skin:  Negative for itching.   Musculoskeletal:  Negative for falls.   Gastrointestinal:  Negative for abdominal pain and hematochezia.   Genitourinary:  Negative  for hematuria.   Neurological:  Negative for dizziness and loss of balance.   Psychiatric/Behavioral:  Negative for altered mental status and depression.        Objective:   Physical Exam    Assessment:     1. Chronic anticoagulation    2. Anemia, unspecified type    3. H/O mechanical aortic valve replacement    4. Congestive heart failure, unspecified HF chronicity, unspecified heart failure type    5. Stage 3b chronic kidney disease    6. Recurrent epistaxis        Plan:       -multiple complex clinical problems that all seemed to get worse after episodes of epistaxis which cause significant anemia.  Has a mechanical aortic valve and needs to be on Coumadin with goal INR between 2-3.  Had 1 more episode of bleeding on Friday.  I recommend that he follows up with his ENT doctor later this week.  Goal hemoglobin greater than 8.  I am reluctant to proceed with coronary or peripheral angiogram at this point considering significant bleeding history requiring PRBC transfusion.  Once hemoglobin is stable my plan is to start him on aspirin 81 mg daily  -continue Imdur 60 mg daily.  He has baseline CKD.  Recent echocardiogram showed elevated intracardiac filling pressures.  Recommend taking Bumex 1 mg daily and take an additional Bumex 1 mg for worsening shortness of breath.  No significant crackles or pitting edema noted on examination.  - once hemoglobin and creatinine is stable I will start the patient on Entresto as blood pressure allows.  - already has low ventricular response with left bundle-branch block on EKG and hence avoiding beta blocker  - follow up within 2 weeks.  My plan is to see him weight regularly in the clinic    Pertinent cardiac images and EKG reviewed independently.    Continue with current medical plan and lifestyle changes.  Return sooner for concerns or questions. If symptoms persist go to the ED  I have reviewed all pertinent data including patient's medical history in detail and updated the  computerized patient record.     No orders of the defined types were placed in this encounter.      Follow up as scheduled.     He expressed verbal understanding and agreed with the plan    Patient's Medications   New Prescriptions    No medications on file   Previous Medications    ACETAMINOPHEN (TYLENOL) 500 MG TABLET    Take 500 mg by mouth daily as needed for Pain.    ALLOPURINOL (ZYLOPRIM) 100 MG TABLET    Take 1 tablet (100 mg total) by mouth once daily.    BUMETANIDE (BUMEX) 1 MG TABLET    Take 2 tablets (2 mg total) by mouth every other day.    FERROUS GLUCONATE (FERGON) 324 MG TABLET    TAKE 1 TABLET EVERY DAY WITH BREAKFAST    ISOSORBIDE MONONITRATE (IMDUR) 60 MG 24 HR TABLET    Take 1 tablet (60 mg total) by mouth every evening.    OMEGA-3 FATTY ACIDS/FISH OIL (FISH OIL-OMEGA-3 FATTY ACIDS) 300-1,000 MG CAPSULE    Take 1 capsule by mouth once daily.    OXYMETAZOLINE (AFRIN) 0.05 % NASAL SPRAY    2 sprays by Nasal route as needed for Congestion (nose bleeds).    ROSUVASTATIN (CRESTOR) 40 MG TAB    TAKE 1 TABLET EVERY EVENING.    SODIUM CHLORIDE (SALINE NASAL) 0.65 % NASAL SPRAY    Use 2 sprays by Nasal route every 4 hours. Apply into nasal cavities daily with nightly application of vaseline/aquaphor to anterior nares. Keep head of bed elevated.    TRAZODONE (DESYREL) 50 MG TABLET    Take 1 tablet (50 mg total) by mouth every evening.    WARFARIN (COUMADIN) 5 MG TABLET    TAKE 1/2 TABLET (2.5MG) SATURDAY, THEN TAKE 1 TABLET (5MG) ALL OTHER DAYS OR AS INSTRUCTED BY COUMADIN CLINIC   Modified Medications    No medications on file   Discontinued Medications    No medications on file        Sameer Espitia M.D

## 2024-10-08 NOTE — PROGRESS NOTES
"Subjective     Patient ID: Dariel Urbina is a 84 y.o. male.  There were no vitals taken for this visit.     Chief Complaint: No chief complaint on file.    Dariel Urbina is a 83 year old male with a PMHx of CHF, CKD stage 4, CAD s/p CABG (1990s), mechanical AVR (on warfarin), moderate-severe MR, recurrent epistaxis, anemia, NSTEMI, PVD, recurrent epistaxis and type 2 diabetes who is presenting for hospital follow up. Patient initially presented to ED with epistaxis and was found to have Hgb of 6.5. He later developed chest pain that was attributed to acute blood loss anemia. He was transfused and given follow up with cardiology and ENT. Saw cards recently who has set a Hgb goal of >8 Cards also with plans to start aspirin and entresto in the future, but this is dependent on stable BP's/renal function, as well as resolution of bleeding. Patient states that he still has slight epistaxis every day, but that it has improved greatly. He denies CP, SOB, fatigue, palpations, lightheadedness/syncope.       *see experts from hospital stay/cardiology visit below:      Hospital course:   "...HPI:   Dariel Urbina is a 83 year old male with a PMHx of CHF, CKD stage 4, CAD s/p CABG (1990s), mechanical AVR (on warfarin), moderate-severe MR, recurrent epistaxis, anemia, NSTEMI, PVD, recurrent epistaxis and type 2 diabetes presenting to the ED for low hgb and chest pain.      Patient has been having recurrent episodes of epistaxis for the past few months and has been followed by ENT. He previously had undergone bilateral SP ligation followed by bilateral embolization, cautery on the left septum for persistent bleeding on 11/03/2023 and again recently on 12/18/23. He recently underwent left AEA ligation on 2/28/24 and recently underwent nasal endoscopy and control of epistaxis on 6/18/24. He then had repeat embolization on 08/06/24. These procedures have not fully controlled epistaxis. Patient also on coumadin for an aortic " "valve replacement for which he was undergoing H/H. It was found that his Hgb was 6.5. Patient was called and recommended to come into the ED for further evaluation and pRBC.      Patient has noted increased dizziness and lightheadedness for the past few days worse than baseline. It has not affected his ADLs however he noted that he has had to be more careful performing daily tasks. Patient also with complaints of chest pain and shortness of breath. He states that his chest pain typically occurs on exertion after waling appx 10-20 steps after resting. These symptoms go away at rest. Patient with hx CAD on IMDUR at home which he mentions helps with his chest pain, however he has noticed his shortness of breath has been mildly worsened.     In the ED, VSS. Labs significant for Hgb 6.4, Cr 2.6 (BL 1.7-2.2, consistently elevated on previous admissions), BNP 1357, troponin 0.041. CXR without evidence of pulmonary edema. Patient given 1 U pRBC and 40 mg IV lasix and admitted to hospital medicine for further workup and evaluation        * No surgery found *       Hospital Course:   83M with hx AVR on coumadin, recurrent epistaxis followed by ENT with multiple procedures sent to ED after routine lab work noted low Hb (6.5). Patient did report symptoms of SOB, fatigue, daily epistaixs.  Also with reported chest pain on exertion and dyspnea on exertion. Also with JIM, elevated BNP concerning for CHF exacerbation. Given lasix and 1 uPRBC on admission. Improved shortness of breath and chest pain stable. EKG stable without acute ischemic findings. Troponin trended to peak. On 9/27 patient with episode of prolonged epistaxis. ENT consulted and packed tissue with fibrillin. Hemoglobin stable, has not had nose bleed since packing. Discharge today with referral to OP cardiology for heart failure management.."         Most recent Cardiology plan:   "..-multiple complex clinical problems that all seemed to get worse after episodes of " "epistaxis which cause significant anemia.  Has a mechanical aortic valve and needs to be on Coumadin with goal INR between 2-3.  Had 1 more episode of bleeding on Friday.  I recommend that he follows up with his ENT doctor later this week.  Goal hemoglobin greater than 8.  I am reluctant to proceed with coronary or peripheral angiogram at this point considering significant bleeding history requiring PRBC transfusion.  Once hemoglobin is stable my plan is to start him on aspirin 81 mg daily  -continue Imdur 60 mg daily.  He has baseline CKD.  Recent echocardiogram showed elevated intracardiac filling pressures.  Recommend taking Bumex 1 mg daily and take an additional Bumex 1 mg for worsening shortness of breath.  No significant crackles or pitting edema noted on examination.  - once hemoglobin and creatinine is stable I will start the patient on Entresto as blood pressure allows.  - already has low ventricular response with left bundle-branch block on EKG and hence avoiding beta blocker  - follow up within 2 weeks.  My plan is to see him weight regularly in the clinic.."        Patient Active Problem List   Diagnosis    Chronic anticoagulation    Hyperlipidemia associated with type 2 diabetes mellitus    History of colon resection    Renovascular hypertension    History of gout    Type 2 diabetes mellitus with stage 4 chronic kidney disease, without long-term current use of insulin    H/O mechanical aortic valve replacement    Atherosclerosis of native artery of extremity with intermittent claudication    Stage 3b chronic kidney disease    Type 2 diabetes mellitus with diabetic peripheral angiopathy without gangrene    Hyperkalemia    Acidosis    Epistaxis    Gastrointestinal hemorrhage associated with intestinal diverticulosis    Hx of colonic polyp    Hypertension associated with diabetes    Bilateral carpal tunnel syndrome    Numbness and tingling in both hands    Severe carpal tunnel syndrome " of right wrist    Anemia    Acute blood loss anemia    Recurrent epistaxis    Supratherapeutic INR    JIM (acute kidney injury)    Coronary artery disease involving native coronary artery of native heart without angina pectoris    Acute exacerbation of CHF (congestive heart failure)    Paroxysmal atrial fibrillation    Mitral insufficiency    Dysphagia    Secondary hyperparathyroidism of renal origin    Dysphonia    ACP (advance care planning)    Chronic kidney disease, stage 4 (severe)    Anemia of chronic renal failure    Hyperuricemia    Nephrolithiasis        Current Outpatient Medications   Medication Sig Dispense Refill    acetaminophen (TYLENOL) 500 MG tablet Take 500 mg by mouth daily as needed for Pain.      allopurinoL (ZYLOPRIM) 100 MG tablet Take 1 tablet (100 mg total) by mouth once daily. 90 tablet 3    bumetanide (BUMEX) 1 MG tablet Take 2 tablets (2 mg total) by mouth every other day. 90 tablet 3    ferrous gluconate (FERGON) 324 MG tablet TAKE 1 TABLET EVERY DAY WITH BREAKFAST 90 tablet 3    isosorbide mononitrate (IMDUR) 60 MG 24 hr tablet Take 1 tablet (60 mg total) by mouth every evening. 90 tablet 3    omega-3 fatty acids/fish oil (FISH OIL-OMEGA-3 FATTY ACIDS) 300-1,000 mg capsule Take 1 capsule by mouth once daily.      oxymetazoline (AFRIN) 0.05 % nasal spray 2 sprays by Nasal route as needed for Congestion (nose bleeds).      rosuvastatin (CRESTOR) 40 MG Tab TAKE 1 TABLET EVERY EVENING. 90 tablet 3    sodium chloride (SALINE NASAL) 0.65 % nasal spray Use 2 sprays by Nasal route every 4 hours. Apply into nasal cavities daily with nightly application of vaseline/aquaphor to anterior nares. Keep head of bed elevated. 44 mL 3    traZODone (DESYREL) 50 MG tablet Take 1 tablet (50 mg total) by mouth every evening. 90 tablet 2    warfarin (COUMADIN) 5 MG tablet TAKE 1/2 TABLET (2.5MG) SATURDAY, THEN TAKE 1 TABLET (5MG) ALL OTHER DAYS OR AS INSTRUCTED BY COUMADIN CLINIC  90 tablet 3     No current facility-administered medications for this visit.     Facility-Administered Medications Ordered in Other Visits   Medication Dose Route Frequency Provider Last Rate Last Admin    ceFAZolin 2 g in dextrose 5 % in water (D5W) 50 mL IVPB (MB+)  2 g Intravenous On Call Procedure Yenifer Sandoval MD        HYDROmorphone injection 0.2 mg  0.2 mg Intravenous Q5 Min PRN Saeed Farrell MD        sodium chloride 0.9% flush 10 mL  10 mL Intravenous PRN Saeed Farrell MD            Review of Systems       Objective     Physical Exam       Assessment and Plan     {There are no diagnoses linked to this encounter. (Refresh or delete this SmartLink)}      David Delaney NP   Internal Medicine           No follow-ups on file.

## 2024-10-08 NOTE — PROGRESS NOTES
[facility]ticoagulation [facility]    10/08/2024 4:07 PM    Assessment/Plan:    Patient presents today with subtherapeutic  INR.    Assessment of patient findings and chart review: Reviewed     Recommendation for patient's warfarin regimen: Increase this week's dose per calendar     Recommend repeat INR in 1 week  _________________________________________________________________    Dariel Devon Urbina (84 y.o.) is followed by the [facility].    Anticoagulation Summary  As of 10/8/2024      INR goal:  2.0-3.0   TTR:  67.4% (12.1 y)   INR used for dosin.4 (10/8/2024)   Warfarin maintenance plan:  2.5 mg (5 mg x 0.5) every Wed, Fri; 5 mg (5 mg x 1) all other days   Weekly warfarin total:  30 mg   Plan last modified:  Laurie Patino, PharmD (2024)   Next INR check:  --   Target end date:  --    Indications    Chronic anticoagulation [Z79.01]  H/O mechanical aortic valve replacement [Z95.2]                 Anticoagulation Episode Summary       INR check location:  Clinic Lab    Preferred lab:  --    Send INR reminders to:  [facility] COUMADIN MONITORING POOL    Comments:  [facility] IM - Internal Med Clinic only Mon - Thu // carpel tunnel release  - target low end of range          Anticoagulation Care Providers       Provider Role Specialty Phone number    Devon Garnica MD [facility] Cardiology [phone]

## 2024-10-08 NOTE — PROGRESS NOTES
"Transitional Care Note  Subjective:       Patient ID: Dariel Urbina is a 84 y.o. male.  Chief Complaint: Follow-up    Family and/or Caretaker present at visit?  Yes.  Diagnostic tests reviewed/disposition: I have reviewed all completed as well as pending diagnostic tests at the time of discharge.  Disease/illness education: Epistaxis, CHF, CKD3b  Home health/community services discussion/referrals: Patient has home health established at [facility] .   Establishment or re-establishment of referral orders for community resources: No other necessary community resources.   Discussion with other health care providers: No discussion with other health care providers necessary.     Dariel Urbina is a 83 year old male with a PMHx of CHF, CKD stage 4, CAD s/p CABG (1990s), mechanical AVR (on warfarin), moderate-severe MR, recurrent epistaxis, anemia, NSTEMI, PVD, recurrent epistaxis and type 2 diabetes who is presenting for hospital follow up. Patient initially presented to ED with epistaxis and was found to have Hgb of 6.5. He later developed chest pain that was attributed to acute blood loss anemia. He was transfused and given follow up with cardiology and ENT. Saw cards recently who has set a Hgb goal of >8 Cards also with plans to start aspirin and entresto in the future, but this is dependent on stable BP's/renal function, as well as resolution of bleeding. Patient states that he still has slight epistaxis every day, but that it has improved greatly. He denies CP, SOB, fatigue, palpations, lightheadedness/syncope.       *see experts from hospital stay/cardiology visit below:      Hospital course:   "...HPI:   Dariel Urbina is a 83 year old male with a PMHx of CHF, CKD stage 4, CAD s/p CABG (1990s), mechanical AVR (on warfarin), moderate-severe MR, recurrent epistaxis, anemia, NSTEMI, PVD, recurrent epistaxis and type 2 diabetes presenting to the ED for low hgb and chest pain.      Patient has been having recurrent " episodes of epistaxis for the past few months and has been followed by ENT. He previously had undergone bilateral SP ligation followed by bilateral embolization, cautery on the left septum for persistent bleeding on 11/03/2023 and again recently on 12/18/23. He recently underwent left AEA ligation on 2/28/24 and recently underwent nasal endoscopy and control of epistaxis on 6/18/24. He then had repeat embolization on 08/06/24. These procedures have not fully controlled epistaxis. Patient also on coumadin for an aortic valve replacement for which he was undergoing H/H. It was found that his Hgb was 6.5. Patient was called and recommended to come into the ED for further evaluation and pRBC.      Patient has noted increased dizziness and lightheadedness for the past few days worse than baseline. It has not affected his ADLs however he noted that he has had to be more careful performing daily tasks. Patient also with complaints of chest pain and shortness of breath. He states that his chest pain typically occurs on exertion after waling appx 10-20 steps after resting. These symptoms go away at rest. Patient with hx CAD on IMDUR at home which he mentions helps with his chest pain, however he has noticed his shortness of breath has been mildly worsened.     In the ED, VSS. Labs significant for Hgb 6.4, Cr 2.6 (BL 1.7-2.2, consistently elevated on previous admissions), BNP 1357, troponin 0.041. CXR without evidence of pulmonary edema. Patient given 1 U pRBC and 40 mg IV lasix and admitted to hospital medicine for further workup and evaluation        * No surgery found *       Hospital Course:   83M with hx AVR on coumadin, recurrent epistaxis followed by ENT with multiple procedures sent to ED after routine lab work noted low Hb (6.5). Patient did report symptoms of SOB, fatigue, daily epistaixs.  Also with reported chest pain on exertion and dyspnea on exertion. Also with JIM, elevated BNP concerning for CHF exacerbation.  "Given lasix and 1 uPRBC on admission. Improved shortness of breath and chest pain stable. EKG stable without acute ischemic findings. Troponin trended to peak. On 9/27 patient with episode of prolonged epistaxis. ENT consulted and packed tissue with fibrillin. Hemoglobin stable, has not had nose bleed since packing. Discharge today with referral to OP cardiology for heart failure management.."         Most recent Cardiology plan:   "..-multiple complex clinical problems that all seemed to get worse after episodes of epistaxis which cause significant anemia.  Has a mechanical aortic valve and needs to be on Coumadin with goal INR between 2-3.  Had 1 more episode of bleeding on Friday.  I recommend that he follows up with his ENT doctor later this week.  Goal hemoglobin greater than 8.  I am reluctant to proceed with coronary or peripheral angiogram at this point considering significant bleeding history requiring PRBC transfusion.  Once hemoglobin is stable my plan is to start him on aspirin 81 mg daily  -continue Imdur 60 mg daily.  He has baseline CKD.  Recent echocardiogram showed elevated intracardiac filling pressures.  Recommend taking Bumex 1 mg daily and take an additional Bumex 1 mg for worsening shortness of breath.  No significant crackles or pitting edema noted on examination.  - once hemoglobin and creatinine is stable I will start the patient on Entresto as blood pressure allows.  - already has low ventricular response with left bundle-branch block on EKG and hence avoiding beta blocker  - follow up within 2 weeks.  My plan is to see him weight regularly in the clinic.."  Review of Systems   HENT:  Positive for nosebleeds.    All other systems reviewed and are negative.    Objective:      Physical Exam  Constitutional:       General: He is not in acute distress.     Appearance: Normal appearance. He is not ill-appearing, toxic-appearing or diaphoretic.   HENT:      Head: Normocephalic and atraumatic.     " " Nose:      Comments: Dried blood noted in bilateral nares      Mouth/Throat:      Mouth: Mucous membranes are moist.      Pharynx: Oropharynx is clear.   Eyes:      Conjunctiva/sclera: Conjunctivae normal.   Cardiovascular:      Rate and Rhythm: Normal rate and regular rhythm.      Heart sounds: Murmur heard.   Pulmonary:      Effort: Pulmonary effort is normal.      Breath sounds: Normal breath sounds.   Abdominal:      General: Abdomen is flat.   Musculoskeletal:         General: Normal range of motion.   Skin:     General: Skin is warm and dry.   Neurological:      General: No focal deficit present.      Mental Status: He is alert and oriented to person, place, and time.   Psychiatric:         Mood and Affect: Mood normal.         Behavior: Behavior normal.       Assessment:       1. Epistaxis    2. Stage 3b chronic kidney disease    3. Chronic combined systolic and diastolic heart failure        Plan:          Epistaxis; ongoing problem, followed by ENT. Will check labs today to assess need for transfusion. Continue current management with afrin and saline spray. Keep follow up appointment with ENT. Return to ED if bleeding reoccurs   -     CBC W/ AUTO DIFFERENTIAL; Future  -     COMPREHENSIVE METABOLIC PANEL; Future    Stage 3b chronic kidney disease; labs today to trend renal function  -     CBC W/ AUTO DIFFERENTIAL; Future  -     COMPREHENSIVE METABOLIC PANEL; Future    Chronic combined systolic and diastolic heart failure  -     CBC W/ AUTO DIFFERENTIAL; Future  -     COMPREHENSIVE METABOLIC PANEL; Future  -Continue current management per cardiology recommendations (see plan below)    "..-multiple complex clinical problems that all seemed to get worse after episodes of epistaxis which cause significant anemia.  Has a mechanical aortic valve and needs to be on Coumadin with goal INR between 2-3.  Had 1 more episode of bleeding on Friday.  I recommend that he follows up with his ENT doctor later this week.  " "Goal hemoglobin greater than 8.  I am reluctant to proceed with coronary or peripheral angiogram at this point considering significant bleeding history requiring PRBC transfusion.  Once hemoglobin is stable my plan is to start him on aspirin 81 mg daily  -continue Imdur 60 mg daily.  He has baseline CKD.  Recent echocardiogram showed elevated intracardiac filling pressures.  Recommend taking Bumex 1 mg daily and take an additional Bumex 1 mg for worsening shortness of breath.  No significant crackles or pitting edema noted on examination.  - once hemoglobin and creatinine is stable I will start the patient on Entresto as blood pressure allows.  - already has low ventricular response with left bundle-branch block on EKG and hence avoiding beta blocker  - follow up within 2 weeks.  My plan is to see him weight regularly in the clinic.."      "

## 2024-10-14 ENCOUNTER — ANTI-COAG VISIT (OUTPATIENT)
Dept: CARDIOLOGY | Facility: CLINIC | Age: 84
End: 2024-10-14
Payer: MEDICARE

## 2024-10-14 ENCOUNTER — LAB VISIT (OUTPATIENT)
Dept: LAB | Facility: HOSPITAL | Age: 84
End: 2024-10-14
Attending: INTERNAL MEDICINE
Payer: MEDICARE

## 2024-10-14 DIAGNOSIS — Z79.01 CHRONIC ANTICOAGULATION: Primary | ICD-10-CM

## 2024-10-14 DIAGNOSIS — Z95.2 H/O MECHANICAL AORTIC VALVE REPLACEMENT: ICD-10-CM

## 2024-10-14 DIAGNOSIS — Z79.01 LONG TERM (CURRENT) USE OF ANTICOAGULANTS: ICD-10-CM

## 2024-10-14 LAB
INR PPP: 1.8 (ref 0.8–1.2)
PROTHROMBIN TIME: 18.7 SEC (ref 9–12.5)

## 2024-10-14 PROCEDURE — 36415 COLL VENOUS BLD VENIPUNCTURE: CPT | Mod: HCNC | Performed by: INTERNAL MEDICINE

## 2024-10-14 PROCEDURE — 93793 ANTICOAG MGMT PT WARFARIN: CPT | Mod: S$GLB,,,

## 2024-10-14 PROCEDURE — 85610 PROTHROMBIN TIME: CPT | Mod: HCNC | Performed by: INTERNAL MEDICINE

## 2024-10-14 NOTE — PROGRESS NOTES
[facility]    10/14/2024 9:57 AM    Assessment/Plan:    Patient presents today with subtherapeutic  INR.    Recommendation for patient's warfarin regimen: Increase maintenance dose    Recommend repeat INR in 1 week  _________________________________________________________________    Dariel Urbina (84 y.o.) is followed by the [facility].    Anticoagulation Summary  As of 10/14/2024      INR goal:  2.0-3.0   TTR:  67.4% (12.1 y)   INR used for dosin.8 (10/14/2024)   Warfarin maintenance plan:  2.5 mg (5 mg x 0.5) every Wed; 5 mg (5 mg x 1) all other days   Weekly warfarin total:  32.5 mg   Plan last modified:  Laurie Patino, PharmD (10/14/2024)   Next INR check:  10/21/2024   Target end date:  --    Indications    Chronic anticoagulation [Z79.01]  H/O mechanical aortic valve replacement [Z95.2]                 Anticoagulation Episode Summary       INR check location:  Clinic Lab    Preferred lab:  --    Send INR reminders to:  [facility] COUMADIN MONITORING POOL    Comments:  [facility] IM - Internal Med Clinic only Mon - Thu // carpel tunnel release  - target low end of range          Anticoagulation Care Providers       Provider Role Specialty Phone number    Devon Garnica MD [facility] Cardiology [phone]

## 2024-10-18 DIAGNOSIS — M10.9 GOUT, UNSPECIFIED CAUSE, UNSPECIFIED CHRONICITY, UNSPECIFIED SITE: ICD-10-CM

## 2024-10-18 RX ORDER — ALLOPURINOL 100 MG/1
100 TABLET ORAL DAILY
Qty: 90 TABLET | Refills: 3 | Status: SHIPPED | OUTPATIENT
Start: 2024-10-18

## 2024-10-18 NOTE — TELEPHONE ENCOUNTER
No care due was identified.  [facility] Embedded Care Due Messages. Reference number: [id number].   10/18/2024 10:10:15 AM CDT

## 2024-10-18 NOTE — TELEPHONE ENCOUNTER
----- Message from Airam sent at 10/18/2024  9:56 AM CDT -----  Contact: [phone] Ameena(wife)  Requesting an RX refill or new RX.    Is this a refill or new RX: new    RX name and strength (copy/paste from chart):  allopurinoL (ZYLOPRIM) 100 MG tablet    Is this a 30 day or 90 day RX: 90    Pharmacy name and phone # (copy/paste from chart):    [facility] - [place], [place] - [address]  [address] [address]  Phone: [phone] Fax: [phone]    The doctors have asked that we provide their patients with the following 2 reminders -- prescription refills can take up to 72 hours, and a friendly reminder that in the future you can use your [facility] account to request refills: no

## 2024-10-21 ENCOUNTER — LAB VISIT (OUTPATIENT)
Dept: LAB | Facility: HOSPITAL | Age: 84
End: 2024-10-21
Attending: INTERNAL MEDICINE
Payer: MEDICARE

## 2024-10-21 ENCOUNTER — ANTI-COAG VISIT (OUTPATIENT)
Dept: CARDIOLOGY | Facility: CLINIC | Age: 84
End: 2024-10-21
Payer: MEDICARE

## 2024-10-21 DIAGNOSIS — Z95.2 H/O MECHANICAL AORTIC VALVE REPLACEMENT: ICD-10-CM

## 2024-10-21 DIAGNOSIS — Z79.01 CHRONIC ANTICOAGULATION: Primary | ICD-10-CM

## 2024-10-21 DIAGNOSIS — Z79.01 LONG TERM (CURRENT) USE OF ANTICOAGULANTS: ICD-10-CM

## 2024-10-21 LAB
INR PPP: 2.6 (ref 0.8–1.2)
PROTHROMBIN TIME: 27.3 SEC (ref 9–12.5)

## 2024-10-21 PROCEDURE — 85610 PROTHROMBIN TIME: CPT | Mod: HCNC | Performed by: INTERNAL MEDICINE

## 2024-10-21 PROCEDURE — 93793 ANTICOAG MGMT PT WARFARIN: CPT | Mod: S$GLB,,,

## 2024-10-21 PROCEDURE — 36415 COLL VENOUS BLD VENIPUNCTURE: CPT | Mod: HCNC | Performed by: INTERNAL MEDICINE

## 2024-10-21 NOTE — PROGRESS NOTES
[facility]    10/21/2024 10:45 AM    Assessment/Plan:    Patient presents today with therapeutic INR.    Assessment of patient findings and chart review: Reviewed     Recommendation for patient's warfarin regimen: Continue current maintenance dose    Recommend repeat INR in 1 week  _________________________________________________________________    Dariel Devon Urbina (84 y.o.) is followed by the [facility].    Anticoagulation Summary  As of 10/21/2024      INR goal:  2.0-3.0   TTR:  67.4% (12.1 y)   INR used for dosin.6 (10/21/2024)   Warfarin maintenance plan:  2.5 mg (5 mg x 0.5) every Wed; 5 mg (5 mg x 1) all other days   Weekly warfarin total:  32.5 mg   Plan last modified:  Laurie Patino, PharmD (10/14/2024)   Next INR check:  10/28/2024   Target end date:  --    Indications    Chronic anticoagulation [Z79.01]  H/O mechanical aortic valve replacement [Z95.2]                 Anticoagulation Episode Summary       INR check location:  Clinic Lab    Preferred lab:  --    Send INR reminders to:  [facility] COUMADIN MONITORING POOL    Comments:  [facility] IM - Internal Med Clinic only Mon - Th // carpel tunnel release  - target low end of range          Anticoagulation Care Providers       Provider Role Specialty Phone number    Devon Garnica MD [facility] Cardiology [phone]

## 2024-10-22 ENCOUNTER — OFFICE VISIT (OUTPATIENT)
Dept: DERMATOLOGY | Facility: CLINIC | Age: 84
End: 2024-10-22
Payer: MEDICARE

## 2024-10-22 DIAGNOSIS — L57.0 ACTINIC KERATOSIS: ICD-10-CM

## 2024-10-22 DIAGNOSIS — L82.1 SEBORRHEIC KERATOSIS: ICD-10-CM

## 2024-10-22 DIAGNOSIS — H61.001 CNH (CHONDRODERMATITIS NODULARIS HELICIS), RIGHT: ICD-10-CM

## 2024-10-22 DIAGNOSIS — D48.5 NEOPLASM OF UNCERTAIN BEHAVIOR OF SKIN: Primary | ICD-10-CM

## 2024-10-22 PROCEDURE — 17000 DESTRUCT PREMALG LESION: CPT | Mod: 59,PBBFAC,HCNC | Performed by: DERMATOLOGY

## 2024-10-22 PROCEDURE — 17110 DESTRUCTION B9 LES UP TO 14: CPT | Mod: 59,PBBFAC,HCNC | Performed by: DERMATOLOGY

## 2024-10-22 PROCEDURE — 99999 PR PBB SHADOW E&M-EST. PATIENT-LVL II: CPT | Mod: PBBFAC,,, | Performed by: DERMATOLOGY

## 2024-10-22 PROCEDURE — 99212 OFFICE O/P EST SF 10 MIN: CPT | Mod: PBBFAC | Performed by: DERMATOLOGY

## 2024-10-22 PROCEDURE — 17110 DESTRUCTION B9 LES UP TO 14: CPT | Mod: 59,S$PBB,HCNC, | Performed by: DERMATOLOGY

## 2024-10-22 PROCEDURE — 11102 TANGNTL BX SKIN SINGLE LES: CPT | Mod: PBBFAC,HCNC | Performed by: DERMATOLOGY

## 2024-10-22 PROCEDURE — 17000 DESTRUCT PREMALG LESION: CPT | Mod: 59,S$PBB,HCNC, | Performed by: DERMATOLOGY

## 2024-10-22 PROCEDURE — 11102 TANGNTL BX SKIN SINGLE LES: CPT | Mod: S$PBB,HCNC,, | Performed by: DERMATOLOGY

## 2024-10-22 PROCEDURE — 99213 OFFICE O/P EST LOW 20 MIN: CPT | Mod: 25,S$PBB,HCNC, | Performed by: DERMATOLOGY

## 2024-10-22 NOTE — PROGRESS NOTES
"  Subjective:      Patient ID:  Dariel Urbina is a 84 y.o. male who presents for   Chief Complaint   Patient presents with    Lesion     HPI  84 y.o. 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  "Sore" on the crown of his scalp. He will pick the scab off but it always comes back. Not tender.  Tenderness to right inferior helix. This is the side that he sleeps on. Tender to the touch.  Scaly spot on midline frontal scalp.  Brown waxy spot on left frontal scalp.    Review of Systems   Skin:  Positive for wears hat. Negative for itching and rash.       Objective:   Physical Exam   Constitutional: He appears well-developed and well-nourished. No distress.   Neurological: He is alert and oriented to person, place, and time. He is not disoriented.   Psychiatric: He has a normal mood and affect.   Skin:   Areas Examined (abnormalities noted in diagram):   Scalp / Hair Palpated and Inspected  Head / Face Inspection Performed            Diagram Legend     Erythematous scaling macule/papule c/w actinic keratosis       Vascular papule c/w angioma      Pigmented verrucoid papule/plaque c/w seborrheic keratosis      Yellow umbilicated papule c/w sebaceous hyperplasia      Irregularly shaped tan macule c/w lentigo     1-2 mm smooth white papules consistent with Milia      Movable subcutaneous cyst with punctum c/w epidermal inclusion cyst      Subcutaneous movable cyst c/w pilar cyst      Firm pink to brown papule c/w dermatofibroma      Pedunculated fleshy papule(s) c/w skin tag(s)      Evenly pigmented macule c/w junctional nevus     Mildly variegated pigmented, slightly irregular-bordered macule c/w mildly atypical nevus      Flesh colored to evenly pigmented papule c/w intradermal nevus       Pink pearly papule/plaque c/w basal cell carcinoma      Erythematous hyperkeratotic cursted plaque c/w SCC      Surgical scar with no sign of skin cancer " recurrence      Open and closed comedones      Inflammatory papules and pustules      Verrucoid papule consistent consistent with wart     Erythematous eczematous patches and plaques     Dystrophic onycholytic nail with subungual debris c/w onychomycosis     Umbilicated papule    Erythematous-base heme-crusted tan verrucoid plaque consistent with inflamed seborrheic keratosis     Erythematous Silvery Scaling Plaque c/w Psoriasis     See annotation                  Assessment / Plan:      Pathology Orders:       Normal Orders This Visit    Specimen to Pathology, Dermatology     Questions:    Procedure Type: Dermatology and skin neoplasms    Number of Specimens: 1    ------------------------: -------------------------    Spec 1 Procedure: Biopsy    Spec 1 Clinical Impression: Ddx SCC vs ISK vs HAK    Spec 1 Source: Crown of scalp    Clinical Information: 84 y.o. 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    Release to patient: Immediate    Send normal result to authorizing provider's In Basket if patient is active on MyChart: Yes          Neoplasm of uncertain behavior of skin  -     Specimen to Pathology, Dermatology  Crown of scalp/vertex scalp with 1.7 cm yellow crusted adherent plaque upon an erythematous base. Ddx SCC vs ISK vs HAK.  - shave biopsy performed today; see procedure note below  - will inform patient of results once available and plan accordingly  - recommend scheduling a FBSE with one of our providers/Dr. Muñiz within the next year    Shave biopsy procedure note:    Shave biopsy performed after verbal consent including risk of infection, scar, recurrence, need for additional treatment of site. Area prepped with alcohol, anesthetized with approximately 1.0cc of 1% lidocaine with epinephrine. Lesional tissue shaved with razor blade. Hemostasis achieved with application of aluminum chloride followed by hyfrecation. No complications. Dressing  applied. Wound care explained.      CNH (chondrodermatitis nodularis helicis), right  Right inferior helix with erythema, tenderness to the touch, and mild crusting. On side that patient sleeps on. Discussed etiology and treatment options. Patient opted for cryodestruction today.  - area treated with cryodestruction today; see procedure note below  - recommend patient avoid significant pressure on this area while sleeping at night    - Cryosurgery procedure note:    Verbal consent from the patient is obtained including, but not limited to, risk of hypopigmentation/hyperpigmentation, scar, recurrence of lesion. Liquid nitrogen cryosurgery is applied to 1 lesions to produce a freeze injury. The patient is aware that blisters may form and is instructed on wound care with gentle cleansing and use of vaseline ointment to keep moist until healed. The patient is supplied a handout on cryosurgery and is instructed to call if lesions do not completely resolve.     Actinic keratosis  Noted on the midline frontal scalp.  - treated with cryodestruction today; see procedure note below    Cryosurgery Procedure Note    Verbal consent from the patient is obtained including, but not limited to, risk of hypopigmentation/hyperpigmentation, scar, recurrence of lesion. The patient is aware of the precancerous quality and need for treatment of these lesions. Liquid nitrogen cryosurgery is applied to the 1 actinic keratoses, as detailed in the physical exam, to produce a freeze injury. The patient is aware that blisters may form and is instructed on wound care with gentle cleansing and use of vaseline ointment to keep moist until healed. The patient is supplied a handout on cryosurgery and is instructed to call if lesions do not completely resolve.      Seborrheic keratosis  Noted on the left frontal scalp. Discussed benign nature of lesion.  - reassurance provided         Yaneth Walker MD  [facility] Dermatology PGY-III

## 2024-10-23 NOTE — PROGRESS NOTES
I have seen the patient, reviewed the Resident's progress note. I have personally interviewed and examined the patient at bedside and agree with the findings.     Usha Muñiz MD  [facility]

## 2024-10-24 ENCOUNTER — OFFICE VISIT (OUTPATIENT)
Dept: CARDIOLOGY | Facility: CLINIC | Age: 84
End: 2024-10-24
Payer: MEDICARE

## 2024-10-24 VITALS
DIASTOLIC BLOOD PRESSURE: 50 MMHG | HEART RATE: 60 BPM | SYSTOLIC BLOOD PRESSURE: 135 MMHG | BODY MASS INDEX: 26.41 KG/M2 | HEIGHT: 65 IN | OXYGEN SATURATION: 97 % | WEIGHT: 158.5 LBS

## 2024-10-24 DIAGNOSIS — I15.2 HYPERTENSION ASSOCIATED WITH DIABETES: ICD-10-CM

## 2024-10-24 DIAGNOSIS — E78.5 HYPERLIPIDEMIA ASSOCIATED WITH TYPE 2 DIABETES MELLITUS: ICD-10-CM

## 2024-10-24 DIAGNOSIS — Z79.01 LONG TERM (CURRENT) USE OF ANTICOAGULANTS: ICD-10-CM

## 2024-10-24 DIAGNOSIS — E11.69 HYPERLIPIDEMIA ASSOCIATED WITH TYPE 2 DIABETES MELLITUS: ICD-10-CM

## 2024-10-24 DIAGNOSIS — E11.59 HYPERTENSION ASSOCIATED WITH DIABETES: ICD-10-CM

## 2024-10-24 DIAGNOSIS — Z95.2 H/O HEART VALVE REPLACEMENT WITH MECHANICAL VALVE: Primary | ICD-10-CM

## 2024-10-24 PROCEDURE — 99999 PR PBB SHADOW E&M-EST. PATIENT-LVL IV: CPT | Mod: PBBFAC,HCNC,, | Performed by: INTERNAL MEDICINE

## 2024-10-24 NOTE — H&P (VIEW-ONLY)
Subjective:   @Patient ID:  Dariel Urbina is a 84 y.o. male who presents for follow-up of No chief complaint on file.      HPI:       Patient is a very pleasant 84-year-old gentleman with extensive cardiac history, has had history of CABG in 1992 along with mechanical aortic valve replacement done by Dr. 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 Martins in Cardiology, has had multiple admissions to the hospital and had several procedures for epistaxis including most recently having embolization of branches of bilateral maxillary arteries,    Patient has had 1 more episode overnight and is seeing an ENT physician tomorrow.  We have discussed anticoagulation recommendations with him.  We are planning to keep the INR between 2 and 2.5.  He has not had any history of thromboembolism or complications with mechanical aortic valve, EF is above 40%.Most recently had echocardiogram in September 24 that showed EF of 45%, grade 3 diastolic dysfunction, moderate-to-severe mitral regurgitation, he was previously evaluated for mitral clip procedure but KIRSTEN showed mild-to-moderate mitral regurgitation.  There are anymore    Results for orders placed during the hospital encounter of 09/26/24    Echo    Interpretation Summary    Left Ventricle: The left ventricle is normal in size. Mildly  increased wall thickness. Mild septal thickening. There is concentric remodeling. Regional wall motion abnormalities present. Septal motion is abnormal. There is mildly reduced systolic function with a visually estimated ejection fraction of 40 - 45%. Ejection fraction by visual approximation is 43%. Grade III diastolic dysfunction.    Right Ventricle: Mild right ventricular enlargement. Wall thickness is normal. Systolic function is borderline low.    Left Atrium: Left atrium is severely dilated.    Right Atrium: Right atrium is moderately dilated.    Aortic Valve: There is a mechanical valve in the aortic position. There is moderate stenosis. Aortic valve area by VTI is 1.30 cm2. Aortic valve peak velocity is 2.84 m/s. Mean gradient is 20 mmHg. The dimensionless index is 0.50.    Mitral Valve: There is moderate bileaflet sclerosis. There is mild mitral annular calcification present. There is moderate to severe regurgitation.    Tricuspid Valve: There is moderate to severe regurgitation.    Pulmonic Valve: There is mild regurgitation.    Pulmonary Artery: There is severe pulmonary hypertension. The estimated pulmonary artery systolic pressure is 79 mmHg.    IVC/SVC: Intermediate venous pressure at 8 mmHg.      No results found for this or any previous visit.      Results for orders placed during the hospital encounter of 04/11/23    Intra-Procedure Documentation    Narrative  KIRSTEN performed in the Invasive Lab  - See Procedure Log link below for nursing documentation  - See KIRSTEN order on Card Proc Tab for physician findings      Please document below the medical necessity for continuous telemetry monitoring or discontinue the current order if appropriate.    Current rhythm from flowsheet:               Patient Active Problem List    Diagnosis Date Noted    Anemia of chronic renal failure 07/08/2024    Hyperuricemia 07/08/2024    Nephrolithiasis 07/08/2024    Chronic kidney disease, stage 4 (severe) 06/12/2024      Noted by MELODIE BARRAGAN MD II last documented on [id number]      ACP (advance care planning) 05/30/2024    Dysphonia 01/18/2024    Secondary hyperparathyroidism of renal origin 01/03/2024    Dysphagia 11/21/2023    Mitral insufficiency 03/22/2023    Paroxysmal atrial fibrillation 02/06/2023    Acute exacerbation of CHF (congestive heart failure) 12/30/2022    Coronary artery disease involving native coronary artery of native heart without angina pectoris 12/29/2022    Acute blood loss anemia 12/21/2022    Recurrent epistaxis 12/21/2022    Supratherapeutic INR 12/21/2022    JIM (acute kidney injury) 12/21/2022    Anemia 02/03/2022    Severe carpal tunnel syndrome of right wrist 05/19/2020    Bilateral carpal tunnel syndrome 04/30/2020    Numbness and tingling in both hands 04/30/2020    Hypertension associated with diabetes 09/25/2019    Hx of colonic polyp 07/28/2018    Gastrointestinal hemorrhage associated with intestinal diverticulosis 04/01/2018     4-3-18 C-scope  - Hemorrhoids found on perianal exam.   - Blood in the rectum and in the sigmoid colon.  - Patent end-to-end colo-colonic anastomosis,  characterized by healthy appearing mucosa.  - Diverticulosis in the sigmoid colon.   - Diverticulosis in the sigmoid colon. There was  active bleeding coming from the diverticular      opening. Clip (MR conditional) was placed.   - One 4 mm polyp in the descending colon. Resection   not attempted.   - One 10 mm polyp at the splenic flexure. Resection  not attempted. Tattooed.      Epistaxis 03/21/2018    Hyperkalemia 03/20/2018    Acidosis 03/20/2018    Stage 3b chronic kidney disease 05/27/2015    Type 2 diabetes mellitus with diabetic peripheral angiopathy without gangrene 05/27/2015    H/O mechanical aortic valve replacement 09/17/2014    Atherosclerosis of native artery of extremity with intermittent claudication 09/17/2014    Type 2 diabetes mellitus with stage 4 chronic kidney disease, without long-term current use  of insulin 10/02/2013    Chronic anticoagulation 09/26/2012    Hyperlipidemia associated with type 2 diabetes mellitus 09/26/2012    History of colon resection 09/26/2012    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    History of gout 09/26/2012                    LAST HbA1c  Lab Results   Component Value Date    HGBA1C 5.3 05/30/2024       Lipid panel  Lab Results   Component Value Date    CHOL 96 (L) 05/29/2024    CHOL 115 (L) 02/28/2023    CHOL 103 (L) 02/10/2022     Lab Results   Component Value Date    HDL 31 (L) 05/29/2024    HDL 37 (L) 02/28/2023    HDL 34 (L) 02/10/2022     Lab Results   Component Value Date    LDLCALC 36.2 (L) 05/29/2024    LDLCALC 47.0 (L) 02/28/2023    LDLCALC 43.4 (L) 02/10/2022     Lab Results   Component Value Date    TRIG 144 05/29/2024    TRIG 155 (H) 02/28/2023    TRIG 128 02/10/2022     Lab Results   Component Value Date    CHOLHDL 32.3 05/29/2024    CHOLHDL 32.2 02/28/2023    CHOLHDL 33.0 02/10/2022            Review of Systems   Constitutional: Negative for chills and fever.   HENT:  Negative for hearing loss and nosebleeds.    Eyes:  Negative for blurred vision.   Cardiovascular:  Negative for chest pain, leg swelling and palpitations.   Respiratory:  Negative for hemoptysis and shortness of breath.    Hematologic/Lymphatic: Negative for bleeding problem.   Skin:  Negative for itching.   Musculoskeletal:  Negative for falls.   Gastrointestinal:  Negative for abdominal pain and hematochezia.   Genitourinary:  Negative for hematuria.   Neurological:  Negative for dizziness and loss of balance.   Psychiatric/Behavioral:  Negative for altered mental status and depression.        Objective:   Physical Exam  Constitutional:       Appearance: He is well-developed.   HENT:      Head: Normocephalic and atraumatic.   Eyes:      Conjunctiva/sclera: Conjunctivae normal.   Neck:      Vascular: No carotid bruit or JVD.   Cardiovascular:      Rate and Rhythm: Normal rate and regular rhythm.       Pulses:           Carotid pulses are 2+ on the right side and 2+ on the left side.       Radial pulses are 2+ on the right side and 2+ on the left side.      Heart sounds: Murmur heard.      No friction rub. No gallop.      Comments: Opening snap noted.  Pulmonary:      Effort: Pulmonary effort is normal. No respiratory distress.      Breath sounds: Normal breath sounds. No stridor. No wheezing.   Musculoskeletal:      Cervical back: Neck supple.   Skin:     General: Skin is warm and dry.   Neurological:      Mental Status: He is alert and oriented to person, place, and time.   Psychiatric:         Behavior: Behavior normal.         Assessment:     1. H/O heart valve replacement with mechanical valve    2. Long term (current) use of anticoagulants    3. Hypertension associated with diabetes    4. Hyperlipidemia associated with type 2 diabetes mellitus        Plan:     -patient has had multiple admissions to the hospital with epistaxis and has undergone multiple ENT procedures.  Considering overall risks and benefit ratio I recommend that his INR be kept between 2 and 2.5.  He is unsure about the type of mechanical valve he had.  I am schedule him for a fluoroscopy to assess for mechanical valve anatomy.  From limited echocardiographic windows he likely does not have a ball and cage or tilting disc mechanical aortic valve  -continue Imdur 60 mg daily.  He has baseline CKD.  Recent echocardiogram showed elevated intracardiac filling pressures.  Recommend taking Bumex 1 mg daily and take an additional Bumex 1 mg for worsening shortness of breath.  No significant crackles or pitting edema noted on examination.  - no more episodes of chest pain.  I am hoping that the chest pain and shortness of breath improves with hemoglobin staying above 8.  No current plans of proceeding with left heart catheterization or PCI of the left circumflex artery in the presence of significant anemia and renal dysfunction.  - see me in the  hospital for fluoroscopy and then follow up in clinic after 1-2 months.      Pertinent cardiac images and EKG reviewed independently.    Continue with current medical plan and lifestyle changes.  Return sooner for concerns or questions. If symptoms persist go to the ED  I have reviewed all pertinent data including patient's medical history in detail and updated the computerized patient record.     Orders Placed This Encounter   Procedures    Case Request Operating Room: Fluoroscopy     Order Specific Question:   Medical Necessity:     Answer:   Medically Non-Urgent [100]     Order Specific Question:   Case classification     Answer:   E - Elective [90]     Order Specific Question:   Post-Procedure Disposition:     Answer:   Other [999]     Order Specific Question:   Estimated Length of Stay:     Answer:   0 midnight     Order Specific Question:   Is an on-site pathologist required for this procedure?     Answer:   N/A     Order Specific Question:   Is the patient currently on anticoagulants and/or antiplatelets?     Answer:   No       Follow up as scheduled.     He expressed verbal understanding and agreed with the plan    Patient's Medications   New Prescriptions    No medications on file   Previous Medications    ACETAMINOPHEN (TYLENOL) 500 MG TABLET    Take 500 mg by mouth daily as needed for Pain.    ALLOPURINOL (ZYLOPRIM) 100 MG TABLET    Take 1 tablet (100 mg total) by mouth once daily.    BUMETANIDE (BUMEX) 1 MG TABLET    Take 2 tablets (2 mg total) by mouth every other day.    FERROUS GLUCONATE (FERGON) 324 MG TABLET    TAKE 1 TABLET EVERY DAY WITH BREAKFAST    ISOSORBIDE MONONITRATE (IMDUR) 60 MG 24 HR TABLET    Take 1 tablet (60 mg total) by mouth every evening.    OMEGA-3 FATTY ACIDS/FISH OIL (FISH OIL-OMEGA-3 FATTY ACIDS) 300-1,000 MG CAPSULE    Take 1 capsule by mouth once daily.    OXYMETAZOLINE (AFRIN) 0.05 % NASAL SPRAY    2 sprays by Nasal route as needed for Congestion (nose bleeds).     ROSUVASTATIN (CRESTOR) 40 MG TAB    TAKE 1 TABLET EVERY EVENING.    SODIUM CHLORIDE (SALINE NASAL) 0.65 % NASAL SPRAY    Use 2 sprays by Nasal route every 4 hours. Apply into nasal cavities daily with nightly application of vaseline/aquaphor to anterior nares. Keep head of bed elevated.    TRAZODONE (DESYREL) 50 MG TABLET    Take 1 tablet (50 mg total) by mouth every evening.    WARFARIN (COUMADIN) 5 MG TABLET    TAKE 1/2 TABLET (2.5MG) SATURDAY, THEN TAKE 1 TABLET (5MG) ALL OTHER DAYS OR AS INSTRUCTED BY COUMADIN CLINIC   Modified Medications    No medications on file   Discontinued Medications    No medications on file        Sameer Espitia M.D

## 2024-10-25 ENCOUNTER — OFFICE VISIT (OUTPATIENT)
Dept: OTOLARYNGOLOGY | Facility: CLINIC | Age: 84
End: 2024-10-25
Payer: MEDICARE

## 2024-10-25 DIAGNOSIS — R04.0 EPISTAXIS: ICD-10-CM

## 2024-10-25 DIAGNOSIS — J34.2 NASAL SEPTAL DEVIATION: Primary | ICD-10-CM

## 2024-10-25 DIAGNOSIS — R04.0 RECURRENT EPISTAXIS: ICD-10-CM

## 2024-10-25 PROCEDURE — 99999 PR PBB SHADOW E&M-EST. PATIENT-LVL II: CPT | Mod: PBBFAC,HCNC,, | Performed by: STUDENT IN AN ORGANIZED HEALTH CARE EDUCATION/TRAINING PROGRAM

## 2024-10-25 NOTE — PROGRESS NOTES
Subjective:      Dariel is a 84 y.o. male who comes for follow-up of  epistaxis . His last visit of me was on 9/24/24.  Having procedure in the near future for his valve assessment to see if he can have INR drainage decreased.  Has been having some episodic bleeding but it was currently controlled.    He previously undergone bilateral SP ligation followed by bilateral embolization, cautery on the left septum for persistent bleeding on 11/03/2023 and again recently on 12/18/23. He recently underwent left AEA ligation on 2/28/24 and recently underwent nasal endoscopy and control of epistaxis on 6/18/24.  He then had repeat embolization on 08/06/24.     His current sinus regime consists of: Saline.     The assessment of quality and severity of symptoms as measured by the SNOT-22 score is deferred.    The patient's medications, allergies, past medical, surgical, social and family histories were reviewed and updated as appropriate.    ROS     A detailed review of systems was obtained with pertinent positives as per the above HPI, and otherwise negative.        Objective:     There were no vitals taken for this visit.       Constitutional:   Vital signs are normal. He appears well-developed and well-nourished.     Head:  Normocephalic and atraumatic.     Ears:  Hearing normal to normal and whispered voice; external ear normal without scars, lesions, or masses; ear canal, tympanic membrane, and middle ear normal..   Right Ear: No swelling. Tympanic membrane is not perforated and not bulging. No middle ear effusion.   Left Ear: No swelling. Tympanic membrane is not perforated and not bulging.  No middle ear effusion.     Nose:  Nose normal including turbinates, nasal mucosa, sinuses and nasal septum. No epistaxis.         Mouth/Throat  Oropharynx clear and moist without lesions or asymmetry and normal uvula midline. Normal dentition. No tonsillar abscesses. Tonsillar exudate.      Neck:  Neck normal without  thyromegaly masses, asymmetry, normal tracheal structure, crepitus, and tenderness, thyroid normal, trachea normal, phonation normal, full range of motion with neck supple and no adenopathy. No stridor present.        Head (right side): No submental adenopathy present.        Head (left side): No submental adenopathy present.     He has no cervical adenopathy.     Pulmonary/Chest:   No stridor.     Procedure    Nasal Endoscopy:  10/25/2024    The use of diagnostic nasal endoscopy was considered medically necessary for the evaluation and visualization of the nasal anatomy for symptoms suggestive of nasal or sinus origin. Physical examination (including a nasal speculum evaluation) did not provide sufficient clinical information to establish a diagnosis, or symptoms did not improve or worsened following treatment.     The nasal cavity was decongested with topical 1% phenylephrine and anesthetized with 4% lidocaine.  A rigid 0-degree endoscope was introduced into the nasal cavity.    The patient was seated in the examination chair. After discussion of risks and benefits, a nasal endoscope was inserted into the nose the endoscope was passed along the left nasal floor to the nasopharynx. It was then passed between the middle and superior meatus, nasal turbinates, nasal septum, nasopharynx and sphenoethmoid region. The nasal endoscope was withdrawn and there was no complications. An identical procedure was performed on the right side. I was present for the entire procedure.The patient tolerated the above procedure well. The findings of this procedure can be found in the dictated note from 10/25/2024 visit.      Very very dry nasal cavity bilaterally.  Stable appearing septal perforation.  Old blood clot part seen in the nasal cavity without any active bleeding.    Data Reviewed    WBC (K/uL)   Date Value   10/08/2024 6.10     Eosinophil % (%)   Date Value   10/08/2024 1.5     Eos # (K/uL)   Date Value   10/08/2024 0.1  "    Platelets (K/uL)   Date Value   10/08/2024 175     Glucose (mg/dL)   Date Value   10/08/2024 156 (H)     No results found for: "IGE"    No sinus imaging available.    Assessment:     1. Nasal septal deviation    2. Epistaxis    3. Recurrent epistaxis      Plan:     - Aggressive nasal saline/Ayr  - Vaseline in nasal cavity at night  - RTC 1 month    Jono Russell MD     "

## 2024-10-29 ENCOUNTER — ANTI-COAG VISIT (OUTPATIENT)
Dept: CARDIOLOGY | Facility: CLINIC | Age: 84
End: 2024-10-29
Payer: MEDICARE

## 2024-10-29 ENCOUNTER — LAB VISIT (OUTPATIENT)
Dept: LAB | Facility: HOSPITAL | Age: 84
End: 2024-10-29
Attending: INTERNAL MEDICINE
Payer: MEDICARE

## 2024-10-29 ENCOUNTER — HOSPITAL ENCOUNTER (OUTPATIENT)
Facility: HOSPITAL | Age: 84
Discharge: HOME | End: 2024-10-29
Attending: INTERNAL MEDICINE | Admitting: INTERNAL MEDICINE
Payer: MEDICARE

## 2024-10-29 VITALS
RESPIRATION RATE: 20 BRPM | HEART RATE: 61 BPM | OXYGEN SATURATION: 98 % | SYSTOLIC BLOOD PRESSURE: 179 MMHG | DIASTOLIC BLOOD PRESSURE: 74 MMHG | TEMPERATURE: 97 F

## 2024-10-29 DIAGNOSIS — Z95.2 H/O MECHANICAL AORTIC VALVE REPLACEMENT: ICD-10-CM

## 2024-10-29 DIAGNOSIS — Z95.2 H/O HEART VALVE REPLACEMENT WITH MECHANICAL VALVE: ICD-10-CM

## 2024-10-29 DIAGNOSIS — Z79.01 CHRONIC ANTICOAGULATION: Primary | ICD-10-CM

## 2024-10-29 DIAGNOSIS — Z79.01 LONG TERM (CURRENT) USE OF ANTICOAGULANTS: ICD-10-CM

## 2024-10-29 LAB
INR PPP: 1.4 (ref 0.8–1.2)
PROTHROMBIN TIME: 15.4 SEC (ref 9–12.5)

## 2024-10-29 PROCEDURE — 85610 PROTHROMBIN TIME: CPT | Mod: HCNC | Performed by: INTERNAL MEDICINE

## 2024-10-29 PROCEDURE — 76000 FLUOROSCOPY <1 HR PHYS/QHP: CPT | Mod: 26,HCNC,, | Performed by: INTERNAL MEDICINE

## 2024-10-29 PROCEDURE — 36415 COLL VENOUS BLD VENIPUNCTURE: CPT | Mod: HCNC | Performed by: INTERNAL MEDICINE

## 2024-10-29 PROCEDURE — 93793 ANTICOAG MGMT PT WARFARIN: CPT | Mod: S$GLB,,,

## 2024-10-29 PROCEDURE — 76000 FLUOROSCOPY <1 HR PHYS/QHP: CPT | Mod: HCNC | Performed by: INTERNAL MEDICINE

## 2024-10-29 NOTE — PLAN OF CARE
Patient gowned and is here just for a cardiac fluoroscopy . Patient arrived per whch and spouse in waiting room.Patient is aao, vss, and has no complaints; Patient did report a nose bleed earlier this am and has them often. Patient sitting on side of bed in pre/post cath awaiting procedure.

## 2024-10-29 NOTE — PLAN OF CARE
Fluoroscopy completed and pt discharged home and has belongings and has no complaints. Patient dressed and escorted to spouse in waiting room per cathi mendieta/ nurse Fatou WALSH

## 2024-11-05 ENCOUNTER — LAB VISIT (OUTPATIENT)
Dept: LAB | Facility: HOSPITAL | Age: 84
End: 2024-11-05
Attending: INTERNAL MEDICINE
Payer: MEDICARE

## 2024-11-05 ENCOUNTER — ANTI-COAG VISIT (OUTPATIENT)
Dept: CARDIOLOGY | Facility: CLINIC | Age: 84
End: 2024-11-05
Payer: MEDICARE

## 2024-11-05 DIAGNOSIS — Z79.01 LONG TERM (CURRENT) USE OF ANTICOAGULANTS: ICD-10-CM

## 2024-11-05 DIAGNOSIS — Z79.01 CHRONIC ANTICOAGULATION: Primary | ICD-10-CM

## 2024-11-05 DIAGNOSIS — Z95.2 H/O MECHANICAL AORTIC VALVE REPLACEMENT: ICD-10-CM

## 2024-11-05 LAB
INR PPP: 1.9 (ref 0.8–1.2)
PROTHROMBIN TIME: 19.5 SEC (ref 9–12.5)

## 2024-11-05 PROCEDURE — 85610 PROTHROMBIN TIME: CPT | Mod: HCNC | Performed by: INTERNAL MEDICINE

## 2024-11-05 PROCEDURE — 36415 COLL VENOUS BLD VENIPUNCTURE: CPT | Mod: HCNC | Performed by: INTERNAL MEDICINE

## 2024-11-05 PROCEDURE — 93793 ANTICOAG MGMT PT WARFARIN: CPT | Mod: S$GLB,,,

## 2024-11-05 NOTE — PROGRESS NOTES
[facility]    2024 1:05 PM    Assessment/Plan:    Patient presents today with subtherapeutic  INR.    Assessment of patient findings and chart review: Reviewed     Recommendation for patient's warfarin regimen: Boost dose today to 7.5mg then resume current maintenance dose    Recommend repeat INR in 2 weeks  _________________________________________________________________    Dariel Devon Urbina (84 y.o.) is followed by the [facility].    Anticoagulation Summary  As of 2024      INR goal:  2.0-3.0   TTR:  67.2% (12.1 y)   INR used for dosin.9 (2024)   Warfarin maintenance plan:  2.5 mg (5 mg x 0.5) every Wed; 5 mg (5 mg x 1) all other days   Weekly warfarin total:  32.5 mg   Plan last modified:  Laurie Patino, PharmD (10/14/2024)   Next INR check:  2024   Target end date:  --    Indications    Chronic anticoagulation [Z79.01]  H/O mechanical aortic valve replacement [Z95.2]                 Anticoagulation Episode Summary       INR check location:  Clinic Lab    Preferred lab:  --    Send INR reminders to:  [facility] COUMADIN MONITORING POOL    Comments:  [facility] IM - Internal Med Clinic only Mon - Thu // carpel tunnel release  - target low end of range          Anticoagulation Care Providers       Provider Role Specialty Phone number    Devon Garnica MD [facility] Cardiology [phone]

## 2024-11-12 ENCOUNTER — TELEPHONE (OUTPATIENT)
Dept: INTERNAL MEDICINE | Facility: CLINIC | Age: 84
End: 2024-11-12
Payer: MEDICARE

## 2024-11-12 NOTE — TELEPHONE ENCOUNTER
----- Message from Vibenita sent at 11/12/2024 12:31 PM CST -----  Contact: [phone]  Requesting an RX refill or new RX.    Is this a refill or new RX: refill    RX name and strength (copy/paste from chart):  albuterol 90 mg    Is this a 30 day or 90 day RX: 30    Pharmacy name and phone # (copy/paste from chart):      [id number] - [place] - [address]  [address]  [place] [address]  Phone: [phone] Fax: [phone]        The doctors have asked that we provide their patients with the following 2 reminders -- prescription refills can take up to 72 hours, and a friendly reminder that in the future you can use your [facility] account to request refills: y

## 2024-11-18 ENCOUNTER — ANTI-COAG VISIT (OUTPATIENT)
Dept: CARDIOLOGY | Facility: CLINIC | Age: 84
End: 2024-11-18
Payer: MEDICARE

## 2024-11-18 ENCOUNTER — LAB VISIT (OUTPATIENT)
Dept: LAB | Facility: HOSPITAL | Age: 84
End: 2024-11-18
Attending: INTERNAL MEDICINE
Payer: MEDICARE

## 2024-11-18 DIAGNOSIS — Z79.01 CHRONIC ANTICOAGULATION: Primary | ICD-10-CM

## 2024-11-18 DIAGNOSIS — Z95.2 H/O MECHANICAL AORTIC VALVE REPLACEMENT: ICD-10-CM

## 2024-11-18 DIAGNOSIS — Z79.01 CHRONIC ANTICOAGULATION: ICD-10-CM

## 2024-11-18 LAB
INR PPP: 2.2 (ref 0.8–1.2)
PROTHROMBIN TIME: 23.4 SEC (ref 9–12.5)

## 2024-11-18 PROCEDURE — 93793 ANTICOAG MGMT PT WARFARIN: CPT | Mod: S$GLB,,,

## 2024-11-18 PROCEDURE — 36415 COLL VENOUS BLD VENIPUNCTURE: CPT | Mod: HCNC | Performed by: INTERNAL MEDICINE

## 2024-11-18 PROCEDURE — 85610 PROTHROMBIN TIME: CPT | Mod: HCNC | Performed by: INTERNAL MEDICINE

## 2024-11-20 NOTE — PROGRESS NOTES
[facility]    2024 4:23 PM    Assessment/Plan:    Patient presents today with therapeutic INR.    Assessment of patient findings and chart review: Reviewed     Recommendation for patient's warfarin regimen: Continue current maintenance dose    Recommend repeat INR in 2 weeks  _________________________________________________________________    Dariel Devon Urbina (84 y.o.) is followed by the [facility].    Anticoagulation Summary  As of 2024      INR goal:  2.0-2.5   TTR:  67.2% (12.1 y)   INR used for dosin.2 (2024)   Warfarin maintenance plan:  2.5 mg (5 mg x 0.5) every Wed; 5 mg (5 mg x 1) all other days   Weekly warfarin total:  32.5 mg   Plan last modified:  Laurie Patino, PharmD (10/14/2024)   Next INR check:  --   Target end date:  --    Indications    Chronic anticoagulation [Z79.01]  H/O mechanical aortic valve replacement [Z95.2]                 Anticoagulation Episode Summary       INR check location:  Clinic Lab    Preferred lab:  --    Send INR reminders to:  [facility] COUMADIN MONITORING POOL    Comments:  [facility] - Internal Med Clinic only Mon - Thu // carpel tunnel release  - target low end of range          Anticoagulation Care Providers       Provider Role Specialty Phone number    Devon Langston Jr., MD [facility] Internal Medicine [phone]

## 2024-12-02 ENCOUNTER — TELEPHONE (OUTPATIENT)
Dept: INTERNAL MEDICINE | Facility: CLINIC | Age: 84
End: 2024-12-02
Payer: MEDICARE

## 2024-12-02 DIAGNOSIS — D64.9 ANEMIA, UNSPECIFIED TYPE: Primary | ICD-10-CM

## 2024-12-02 NOTE — TELEPHONE ENCOUNTER
----- Message from Leonor sent at 12/2/2024  8:52 AM CST -----  Contact: Ameena (wife) [phone]  Attn: David    .[email or web address]     Patient is calling for Medical Advice regarding: Pt wife states Pt experiencing nose bleeds - not now but over the holiday. Pt wife would like to know if Hemoglobin test can be added to labs for tomorrow at 8:30 am?    How long has patient had these symptoms: n/a    Pharmacy name and phone#: n/a    Patient wants a call back or thru [facility]: call back to wife Ameena    Comments:    Please advise patient replies from provider may take up to 48 hours.

## 2024-12-03 ENCOUNTER — ANTI-COAG VISIT (OUTPATIENT)
Dept: CARDIOLOGY | Facility: CLINIC | Age: 84
End: 2024-12-03
Payer: MEDICARE

## 2024-12-03 ENCOUNTER — LAB VISIT (OUTPATIENT)
Dept: LAB | Facility: HOSPITAL | Age: 84
End: 2024-12-03
Attending: INTERNAL MEDICINE
Payer: MEDICARE

## 2024-12-03 DIAGNOSIS — Z79.01 CHRONIC ANTICOAGULATION: ICD-10-CM

## 2024-12-03 DIAGNOSIS — D64.9 ANEMIA, UNSPECIFIED TYPE: ICD-10-CM

## 2024-12-03 DIAGNOSIS — Z95.2 H/O MECHANICAL AORTIC VALVE REPLACEMENT: ICD-10-CM

## 2024-12-03 DIAGNOSIS — Z79.01 CHRONIC ANTICOAGULATION: Primary | ICD-10-CM

## 2024-12-03 LAB
BASOPHILS # BLD AUTO: 0.04 K/UL (ref 0–0.2)
BASOPHILS NFR BLD: 0.8 % (ref 0–1.9)
DIFFERENTIAL METHOD BLD: ABNORMAL
EOSINOPHIL # BLD AUTO: 0.4 K/UL (ref 0–0.5)
EOSINOPHIL NFR BLD: 7.5 % (ref 0–8)
ERYTHROCYTE [DISTWIDTH] IN BLOOD BY AUTOMATED COUNT: 14.9 % (ref 11.5–14.5)
HCT VFR BLD AUTO: 23.2 % (ref 40–54)
HGB BLD-MCNC: 7 G/DL (ref 14–18)
IMM GRANULOCYTES # BLD AUTO: 0.01 K/UL (ref 0–0.04)
IMM GRANULOCYTES NFR BLD AUTO: 0.2 % (ref 0–0.5)
INR PPP: 1.6 (ref 0.8–1.2)
LYMPHOCYTES # BLD AUTO: 1.5 K/UL (ref 1–4.8)
LYMPHOCYTES NFR BLD: 30.5 % (ref 18–48)
MCH RBC QN AUTO: 30.3 PG (ref 27–31)
MCHC RBC AUTO-ENTMCNC: 30.2 G/DL (ref 32–36)
MCV RBC AUTO: 100 FL (ref 82–98)
MONOCYTES # BLD AUTO: 0.6 K/UL (ref 0.3–1)
MONOCYTES NFR BLD: 11.4 % (ref 4–15)
NEUTROPHILS # BLD AUTO: 2.4 K/UL (ref 1.8–7.7)
NEUTROPHILS NFR BLD: 49.6 % (ref 38–73)
NRBC BLD-RTO: 0 /100 WBC
PLATELET # BLD AUTO: 143 K/UL (ref 150–450)
PMV BLD AUTO: 11.6 FL (ref 9.2–12.9)
PROTHROMBIN TIME: 17.5 SEC (ref 9–12.5)
RBC # BLD AUTO: 2.31 M/UL (ref 4.6–6.2)
WBC # BLD AUTO: 4.92 K/UL (ref 3.9–12.7)

## 2024-12-03 PROCEDURE — 85025 COMPLETE CBC W/AUTO DIFF WBC: CPT | Mod: HCNC | Performed by: INTERNAL MEDICINE

## 2024-12-03 PROCEDURE — 36415 COLL VENOUS BLD VENIPUNCTURE: CPT | Mod: HCNC | Performed by: INTERNAL MEDICINE

## 2024-12-03 PROCEDURE — 85610 PROTHROMBIN TIME: CPT | Mod: HCNC | Performed by: INTERNAL MEDICINE

## 2024-12-03 PROCEDURE — 93793 ANTICOAG MGMT PT WARFARIN: CPT | Mod: S$GLB,,,

## 2024-12-03 NOTE — PROGRESS NOTES
[facility]    2024 12:31 PM    Assessment/Plan:    Patient presents today with subtherapeutic  INR.    Assessment of patient findings and chart review: Reviewed.  Pt missed dosing (see calendar).  Pt to apply pressure, use Afrin & notify CC/PCP if bleeding last for an extended time.    Recommendation for patient's warfarin regimen: Boost dose today to 7.5mg then resume current maintenance dose    Recommend repeat INR in 1 week  _________________________________________________________________    Dariel Urbina (84 y.o.) is followed by the [facility].    Anticoagulation Summary  As of 12/3/2024      INR goal:  2.0-2.5   TTR:  67.1% (12.2 y)   INR used for dosin.6 (12/3/2024)   Warfarin maintenance plan:  2.5 mg (5 mg x 0.5) every Wed; 5 mg (5 mg x 1) all other days   Weekly warfarin total:  32.5 mg   Plan last modified:  Laurie Patino, PharmD (10/14/2024)   Next INR check:  12/10/2024   Target end date:  --    Indications    Chronic anticoagulation [Z79.01]  H/O mechanical aortic valve replacement [Z95.2]                 Anticoagulation Episode Summary       INR check location:  Clinic Lab    Preferred lab:  --    Send INR reminders to:  [facility] COUMADIN MONITORING POOL    Comments:  [facility] - Internal Med Clinic only Mon - Thu // carpel tunnel release  - target low end of range          Anticoagulation Care Providers       Provider Role Specialty Phone number    Devon Langston Jr., MD [facility] [phone]

## 2024-12-10 ENCOUNTER — LAB VISIT (OUTPATIENT)
Dept: LAB | Facility: HOSPITAL | Age: 84
End: 2024-12-10
Attending: INTERNAL MEDICINE
Payer: MEDICARE

## 2024-12-10 ENCOUNTER — ANTI-COAG VISIT (OUTPATIENT)
Dept: CARDIOLOGY | Facility: CLINIC | Age: 84
End: 2024-12-10
Payer: MEDICARE

## 2024-12-10 DIAGNOSIS — Z95.2 H/O MECHANICAL AORTIC VALVE REPLACEMENT: ICD-10-CM

## 2024-12-10 DIAGNOSIS — Z79.01 CHRONIC ANTICOAGULATION: Primary | ICD-10-CM

## 2024-12-10 DIAGNOSIS — Z79.01 CHRONIC ANTICOAGULATION: ICD-10-CM

## 2024-12-10 LAB
INR PPP: 2.7 (ref 0.8–1.2)
PROTHROMBIN TIME: 28.2 SEC (ref 9–12.5)

## 2024-12-10 PROCEDURE — 93793 ANTICOAG MGMT PT WARFARIN: CPT | Mod: S$GLB,,,

## 2024-12-10 PROCEDURE — 85610 PROTHROMBIN TIME: CPT | Mod: HCNC | Performed by: INTERNAL MEDICINE

## 2024-12-10 PROCEDURE — 36415 COLL VENOUS BLD VENIPUNCTURE: CPT | Mod: HCNC | Performed by: INTERNAL MEDICINE

## 2024-12-10 NOTE — PROGRESS NOTES
[facility]    12/10/2024 12:35 PM    Assessment/Plan:    Patient presents today with supratherapeutic INR.    Assessment of patient findings and chart review: Reviewed    Recommendation for patient's warfarin regimen: Lower dose today to 2.5mg then resume current maintenance dose    Recommend repeat INR in 1 week  _________________________________________________________________    Dariel Devon Urbina (84 y.o.) is followed by the [email or web address].    Anticoagulation Summary  As of 12/10/2024      INR goal:  2.0-2.5   TTR:  67.1% (12.2 y)   INR used for dosin.7 (12/10/2024)   Warfarin maintenance plan:  2.5 mg (5 mg x 0.5) every Wed; 5 mg (5 mg x 1) all other days   Weekly warfarin total:  32.5 mg   Plan last modified:  Laurie Patino, PharmD (10/14/2024)   Next INR check:  2024   Target end date:  --    Indications    Chronic anticoagulation [Z79.01]  H/O mechanical aortic valve replacement [Z95.2]                 Anticoagulation Episode Summary       INR check location:  Clinic Lab    Preferred lab:  --    Send INR reminders to:  [facility] COUMADIN MONITORING POOL    Comments:  [facility] - Internal Med Clinic only Mon - Thu // carpel tunnel release  - target low end of range          Anticoagulation Care Providers       Provider Role Specialty Phone number    Devon Langston Jr., MD [facility] Internal Medicine [phone]

## 2024-12-13 ENCOUNTER — TELEPHONE (OUTPATIENT)
Dept: OTOLARYNGOLOGY | Facility: CLINIC | Age: 84
End: 2024-12-13
Payer: MEDICARE

## 2024-12-13 ENCOUNTER — HOSPITAL ENCOUNTER (INPATIENT)
Facility: HOSPITAL | Age: 84
LOS: 4 days | Discharge: HOME | DRG: 151 | End: 2024-12-18
Attending: EMERGENCY MEDICINE | Admitting: STUDENT IN AN ORGANIZED HEALTH CARE EDUCATION/TRAINING PROGRAM
Payer: MEDICARE

## 2024-12-13 DIAGNOSIS — R04.0 LEFT-SIDED EPISTAXIS: Primary | ICD-10-CM

## 2024-12-13 DIAGNOSIS — R07.9 CHEST PAIN: ICD-10-CM

## 2024-12-13 DIAGNOSIS — D64.9 ANEMIA, UNSPECIFIED TYPE: ICD-10-CM

## 2024-12-13 DIAGNOSIS — R04.0 RECURRENT EPISTAXIS: ICD-10-CM

## 2024-12-13 DIAGNOSIS — Z95.2 H/O MECHANICAL AORTIC VALVE REPLACEMENT: ICD-10-CM

## 2024-12-13 LAB
ABO + RH BLD: NORMAL
ALBUMIN SERPL BCP-MCNC: 3.1 G/DL (ref 3.5–5.2)
ALP SERPL-CCNC: 64 U/L (ref 40–150)
ALT SERPL W/O P-5'-P-CCNC: 11 U/L (ref 10–44)
ANION GAP SERPL CALC-SCNC: 12 MMOL/L (ref 8–16)
AST SERPL-CCNC: 15 U/L (ref 10–40)
BASOPHILS # BLD AUTO: 0.03 K/UL (ref 0–0.2)
BASOPHILS # BLD AUTO: 0.04 K/UL (ref 0–0.2)
BASOPHILS NFR BLD: 0.4 % (ref 0–1.9)
BASOPHILS NFR BLD: 0.6 % (ref 0–1.9)
BILIRUB SERPL-MCNC: 0.4 MG/DL (ref 0.1–1)
BLD GP AB SCN CELLS X3 SERPL QL: NORMAL
BLD PROD TYP BPU: NORMAL
BLOOD UNIT EXPIRATION DATE: NORMAL
BLOOD UNIT TYPE CODE: 9500
BLOOD UNIT TYPE: NORMAL
BUN SERPL-MCNC: 58 MG/DL (ref 8–23)
CALCIUM SERPL-MCNC: 9.6 MG/DL (ref 8.7–10.5)
CHLORIDE SERPL-SCNC: 107 MMOL/L (ref 95–110)
CO2 SERPL-SCNC: 20 MMOL/L (ref 23–29)
CODING SYSTEM: NORMAL
CREAT SERPL-MCNC: 2.6 MG/DL (ref 0.5–1.4)
CROSSMATCH INTERPRETATION: NORMAL
DIFFERENTIAL METHOD BLD: ABNORMAL
DIFFERENTIAL METHOD BLD: ABNORMAL
DISPENSE STATUS: NORMAL
EOSINOPHIL # BLD AUTO: 0.1 K/UL (ref 0–0.5)
EOSINOPHIL # BLD AUTO: 0.1 K/UL (ref 0–0.5)
EOSINOPHIL NFR BLD: 0.8 % (ref 0–8)
EOSINOPHIL NFR BLD: 2 % (ref 0–8)
ERYTHROCYTE [DISTWIDTH] IN BLOOD BY AUTOMATED COUNT: 14.6 % (ref 11.5–14.5)
ERYTHROCYTE [DISTWIDTH] IN BLOOD BY AUTOMATED COUNT: 15.9 % (ref 11.5–14.5)
EST. GFR  (NO RACE VARIABLE): 23.7 ML/MIN/1.73 M^2
ESTIMATED AVG GLUCOSE: 100 MG/DL (ref 68–131)
GLUCOSE SERPL-MCNC: 118 MG/DL (ref 70–110)
HBA1C MFR BLD: 5.1 % (ref 4–5.6)
HCT VFR BLD AUTO: 19.5 % (ref 40–54)
HCT VFR BLD AUTO: 24.6 % (ref 40–54)
HGB BLD-MCNC: 6 G/DL (ref 14–18)
HGB BLD-MCNC: 8.1 G/DL (ref 14–18)
IMM GRANULOCYTES # BLD AUTO: 0.04 K/UL (ref 0–0.04)
IMM GRANULOCYTES # BLD AUTO: 0.07 K/UL (ref 0–0.04)
IMM GRANULOCYTES NFR BLD AUTO: 0.7 % (ref 0–0.5)
IMM GRANULOCYTES NFR BLD AUTO: 0.7 % (ref 0–0.5)
INR PPP: 2.4 (ref 0.8–1.2)
LYMPHOCYTES # BLD AUTO: 0.9 K/UL (ref 1–4.8)
LYMPHOCYTES # BLD AUTO: 1 K/UL (ref 1–4.8)
LYMPHOCYTES NFR BLD: 15.9 % (ref 18–48)
LYMPHOCYTES NFR BLD: 9.2 % (ref 18–48)
MCH RBC QN AUTO: 30.6 PG (ref 27–31)
MCH RBC QN AUTO: 31.6 PG (ref 27–31)
MCHC RBC AUTO-ENTMCNC: 30.8 G/DL (ref 32–36)
MCHC RBC AUTO-ENTMCNC: 32.9 G/DL (ref 32–36)
MCV RBC AUTO: 100 FL (ref 82–98)
MCV RBC AUTO: 96 FL (ref 82–98)
MONOCYTES # BLD AUTO: 0.5 K/UL (ref 0.3–1)
MONOCYTES # BLD AUTO: 0.5 K/UL (ref 0.3–1)
MONOCYTES NFR BLD: 5 % (ref 4–15)
MONOCYTES NFR BLD: 8.7 % (ref 4–15)
NEUTROPHILS # BLD AUTO: 3.9 K/UL (ref 1.8–7.7)
NEUTROPHILS # BLD AUTO: 9 K/UL (ref 1.8–7.7)
NEUTROPHILS NFR BLD: 72.1 % (ref 38–73)
NEUTROPHILS NFR BLD: 83.9 % (ref 38–73)
NRBC BLD-RTO: 0 /100 WBC
NRBC BLD-RTO: 0 /100 WBC
OHS QRS DURATION: 154 MS
OHS QTC CALCULATION: 474 MS
PLATELET # BLD AUTO: 174 K/UL (ref 150–450)
PLATELET # BLD AUTO: 195 K/UL (ref 150–450)
PMV BLD AUTO: 10.5 FL (ref 9.2–12.9)
PMV BLD AUTO: 10.6 FL (ref 9.2–12.9)
POCT GLUCOSE: 114 MG/DL (ref 70–110)
POTASSIUM SERPL-SCNC: 4.5 MMOL/L (ref 3.5–5.1)
PROT SERPL-MCNC: 6.6 G/DL (ref 6–8.4)
PROTHROMBIN TIME: 24.6 SEC (ref 9–12.5)
RBC # BLD AUTO: 1.96 M/UL (ref 4.6–6.2)
RBC # BLD AUTO: 2.56 M/UL (ref 4.6–6.2)
SODIUM SERPL-SCNC: 139 MMOL/L (ref 136–145)
SPECIMEN OUTDATE: NORMAL
TRANS ERYTHROCYTES VOL PATIENT: NORMAL ML
WBC # BLD AUTO: 10.75 K/UL (ref 3.9–12.7)
WBC # BLD AUTO: 5.4 K/UL (ref 3.9–12.7)

## 2024-12-13 PROCEDURE — 80053 COMPREHEN METABOLIC PANEL: CPT | Mod: HCNC | Performed by: EMERGENCY MEDICINE

## 2024-12-13 PROCEDURE — 63600175 PHARM REV CODE 636 W HCPCS: Mod: HCNC | Performed by: EMERGENCY MEDICINE

## 2024-12-13 PROCEDURE — 25000003 PHARM REV CODE 250: Mod: HCNC

## 2024-12-13 PROCEDURE — G0378 HOSPITAL OBSERVATION PER HR: HCPCS | Mod: HCNC

## 2024-12-13 PROCEDURE — 85025 COMPLETE CBC W/AUTO DIFF WBC: CPT | Mod: HCNC | Performed by: EMERGENCY MEDICINE

## 2024-12-13 PROCEDURE — 99222 1ST HOSP IP/OBS MODERATE 55: CPT | Mod: HCNC,,, | Performed by: OTOLARYNGOLOGY

## 2024-12-13 PROCEDURE — 83036 HEMOGLOBIN GLYCOSYLATED A1C: CPT | Mod: HCNC | Performed by: EMERGENCY MEDICINE

## 2024-12-13 PROCEDURE — 85610 PROTHROMBIN TIME: CPT | Mod: HCNC | Performed by: EMERGENCY MEDICINE

## 2024-12-13 PROCEDURE — 96360 HYDRATION IV INFUSION INIT: CPT | Mod: HCNC

## 2024-12-13 PROCEDURE — 36430 TRANSFUSION BLD/BLD COMPNT: CPT | Mod: HCNC

## 2024-12-13 PROCEDURE — P9021 RED BLOOD CELLS UNIT: HCPCS | Mod: HCNC

## 2024-12-13 PROCEDURE — 30233N1 TRANSFUSION OF NONAUTOLOGOUS RED BLOOD CELLS INTO PERIPHERAL VEIN, PERCUTANEOUS APPROACH: ICD-10-PCS | Performed by: STUDENT IN AN ORGANIZED HEALTH CARE EDUCATION/TRAINING PROGRAM

## 2024-12-13 PROCEDURE — 86920 COMPATIBILITY TEST SPIN: CPT | Mod: HCNC

## 2024-12-13 PROCEDURE — 25000003 PHARM REV CODE 250: Mod: HCNC | Performed by: EMERGENCY MEDICINE

## 2024-12-13 PROCEDURE — 85025 COMPLETE CBC W/AUTO DIFF WBC: CPT | Mod: 91,HCNC

## 2024-12-13 PROCEDURE — 86850 RBC ANTIBODY SCREEN: CPT | Mod: HCNC | Performed by: EMERGENCY MEDICINE

## 2024-12-13 PROCEDURE — 99285 EMERGENCY DEPT VISIT HI MDM: CPT | Mod: 25,HCNC

## 2024-12-13 RX ORDER — LIDOCAINE HYDROCHLORIDE AND EPINEPHRINE 10; 10 UG/ML; MG/ML
10 INJECTION, SOLUTION INFILTRATION; PERINEURAL ONCE
Status: COMPLETED | OUTPATIENT
Start: 2024-12-13 | End: 2024-12-13

## 2024-12-13 RX ORDER — INSULIN ASPART 100 [IU]/ML
0-5 INJECTION, SOLUTION INTRAVENOUS; SUBCUTANEOUS
Status: DISCONTINUED | OUTPATIENT
Start: 2024-12-13 | End: 2024-12-18 | Stop reason: HOSPADM

## 2024-12-13 RX ORDER — TALC
6 POWDER (GRAM) TOPICAL NIGHTLY PRN
Status: DISCONTINUED | OUTPATIENT
Start: 2024-12-13 | End: 2024-12-13

## 2024-12-13 RX ORDER — GLUCAGON 1 MG
1 KIT INJECTION
Status: DISCONTINUED | OUTPATIENT
Start: 2024-12-13 | End: 2024-12-18 | Stop reason: HOSPADM

## 2024-12-13 RX ORDER — IBUPROFEN 200 MG
24 TABLET ORAL
Status: DISCONTINUED | OUTPATIENT
Start: 2024-12-13 | End: 2024-12-18 | Stop reason: HOSPADM

## 2024-12-13 RX ORDER — ATORVASTATIN CALCIUM 40 MG/1
80 TABLET, FILM COATED ORAL DAILY
Status: DISCONTINUED | OUTPATIENT
Start: 2024-12-13 | End: 2024-12-18 | Stop reason: HOSPADM

## 2024-12-13 RX ORDER — SODIUM CHLORIDE 0.9 % (FLUSH) 0.9 %
10 SYRINGE (ML) INJECTION EVERY 12 HOURS PRN
Status: DISCONTINUED | OUTPATIENT
Start: 2024-12-13 | End: 2024-12-18 | Stop reason: HOSPADM

## 2024-12-13 RX ORDER — OXYMETAZOLINE HCL 0.05 %
2 SPRAY, NON-AEROSOL (ML) NASAL EVERY 8 HOURS
Status: COMPLETED | OUTPATIENT
Start: 2024-12-13 | End: 2024-12-16

## 2024-12-13 RX ORDER — ACETAMINOPHEN 500 MG
1000 TABLET ORAL EVERY 6 HOURS PRN
Status: DISCONTINUED | OUTPATIENT
Start: 2024-12-13 | End: 2024-12-18 | Stop reason: HOSPADM

## 2024-12-13 RX ORDER — TRAZODONE HYDROCHLORIDE 50 MG/1
50 TABLET ORAL NIGHTLY PRN
Status: DISCONTINUED | OUTPATIENT
Start: 2024-12-13 | End: 2024-12-18 | Stop reason: HOSPADM

## 2024-12-13 RX ORDER — MUPIROCIN 20 MG/G
OINTMENT TOPICAL DAILY
Status: DISCONTINUED | OUTPATIENT
Start: 2024-12-14 | End: 2024-12-18 | Stop reason: HOSPADM

## 2024-12-13 RX ORDER — ALUMINUM HYDROXIDE, MAGNESIUM HYDROXIDE, AND SIMETHICONE 1200; 120; 1200 MG/30ML; MG/30ML; MG/30ML
30 SUSPENSION ORAL 4 TIMES DAILY PRN
Status: DISCONTINUED | OUTPATIENT
Start: 2024-12-13 | End: 2024-12-18 | Stop reason: HOSPADM

## 2024-12-13 RX ORDER — IBUPROFEN 200 MG
16 TABLET ORAL
Status: DISCONTINUED | OUTPATIENT
Start: 2024-12-13 | End: 2024-12-18 | Stop reason: HOSPADM

## 2024-12-13 RX ORDER — BISACODYL 10 MG/1
10 SUPPOSITORY RECTAL DAILY PRN
Status: DISCONTINUED | OUTPATIENT
Start: 2024-12-13 | End: 2024-12-18 | Stop reason: HOSPADM

## 2024-12-13 RX ORDER — BUMETANIDE 1 MG/1
2 TABLET ORAL EVERY OTHER DAY
Status: DISCONTINUED | OUTPATIENT
Start: 2024-12-14 | End: 2024-12-18 | Stop reason: HOSPADM

## 2024-12-13 RX ORDER — ONDANSETRON 8 MG/1
8 TABLET, ORALLY DISINTEGRATING ORAL EVERY 8 HOURS PRN
Status: DISCONTINUED | OUTPATIENT
Start: 2024-12-13 | End: 2024-12-18 | Stop reason: HOSPADM

## 2024-12-13 RX ORDER — NALOXONE HCL 0.4 MG/ML
0.02 VIAL (ML) INJECTION
Status: DISCONTINUED | OUTPATIENT
Start: 2024-12-13 | End: 2024-12-18 | Stop reason: HOSPADM

## 2024-12-13 RX ORDER — OXYMETAZOLINE HCL 0.05 %
2 SPRAY, NON-AEROSOL (ML) NASAL
Status: COMPLETED | OUTPATIENT
Start: 2024-12-13 | End: 2024-12-13

## 2024-12-13 RX ORDER — DOXYCYCLINE HYCLATE 100 MG
100 TABLET ORAL EVERY 12 HOURS
Status: DISCONTINUED | OUTPATIENT
Start: 2024-12-13 | End: 2024-12-17

## 2024-12-13 RX ORDER — POLYETHYLENE GLYCOL 3350 17 G/17G
17 POWDER, FOR SOLUTION ORAL 2 TIMES DAILY PRN
Status: DISCONTINUED | OUTPATIENT
Start: 2024-12-13 | End: 2024-12-18 | Stop reason: HOSPADM

## 2024-12-13 RX ORDER — PROCHLORPERAZINE EDISYLATE 5 MG/ML
5 INJECTION INTRAMUSCULAR; INTRAVENOUS EVERY 6 HOURS PRN
Status: DISCONTINUED | OUTPATIENT
Start: 2024-12-13 | End: 2024-12-18 | Stop reason: HOSPADM

## 2024-12-13 RX ORDER — ALLOPURINOL 100 MG/1
100 TABLET ORAL DAILY
Status: DISCONTINUED | OUTPATIENT
Start: 2024-12-14 | End: 2024-12-18 | Stop reason: HOSPADM

## 2024-12-13 RX ORDER — SIMETHICONE 80 MG
1 TABLET,CHEWABLE ORAL 4 TIMES DAILY PRN
Status: DISCONTINUED | OUTPATIENT
Start: 2024-12-13 | End: 2024-12-18 | Stop reason: HOSPADM

## 2024-12-13 RX ORDER — FERROUS GLUCONATE 324(37.5)
324 TABLET ORAL
Status: DISCONTINUED | OUTPATIENT
Start: 2024-12-14 | End: 2024-12-18 | Stop reason: HOSPADM

## 2024-12-13 RX ORDER — HYDROCODONE BITARTRATE AND ACETAMINOPHEN 500; 5 MG/1; MG/1
TABLET ORAL
Status: DISCONTINUED | OUTPATIENT
Start: 2024-12-13 | End: 2024-12-16

## 2024-12-13 RX ORDER — ISOSORBIDE MONONITRATE 60 MG/1
60 TABLET, EXTENDED RELEASE ORAL NIGHTLY
Status: DISCONTINUED | OUTPATIENT
Start: 2024-12-13 | End: 2024-12-18 | Stop reason: HOSPADM

## 2024-12-13 RX ADMIN — TRANEXAMIC ACID 500 MG: 100 INJECTION INTRAVENOUS at 04:12

## 2024-12-13 RX ADMIN — Medication 2 SPRAY: at 10:12

## 2024-12-13 RX ADMIN — NASAL 1 SPRAY: 6.5 SPRAY NASAL at 10:12

## 2024-12-13 RX ADMIN — LIDOCAINE HYDROCHLORIDE AND EPINEPHRINE 10 ML: 10; 10 INJECTION, SOLUTION INFILTRATION; PERINEURAL at 01:12

## 2024-12-13 RX ADMIN — SODIUM CHLORIDE 1000 ML: 9 INJECTION, SOLUTION INTRAVENOUS at 02:12

## 2024-12-13 RX ADMIN — ISOSORBIDE MONONITRATE 60 MG: 60 TABLET, EXTENDED RELEASE ORAL at 10:12

## 2024-12-13 RX ADMIN — Medication 2 SPRAY: at 04:12

## 2024-12-13 RX ADMIN — DOXYCYCLINE HYCLATE 100 MG: 100 TABLET, COATED ORAL at 10:12

## 2024-12-13 RX ADMIN — ATORVASTATIN CALCIUM 80 MG: 40 TABLET, FILM COATED ORAL at 03:12

## 2024-12-13 RX ADMIN — Medication 2 SPRAY: at 12:12

## 2024-12-13 NOTE — ASSESSMENT & PLAN NOTE
Hypertension associated with diabetes  - Latest BP and vitals reviewed  - Continue home meds for HTN:   Hypertension Medications               bumetanide (BUMEX) 1 MG tablet Take 2 tablets (2 mg total) by mouth every other day.    isosorbide mononitrate (IMDUR) 60 MG 24 hr tablet Take 1 tablet (60 mg total) by mouth every evening.   - Goal SBP 120s-140s. Utilize p.r.n. antihypertensives only if patient's BP >180/110 & develop symptoms of worsening CP or SOB.

## 2024-12-13 NOTE — SUBJECTIVE & OBJECTIVE
Past Medical History:   Diagnosis Date    Anemia of chronic renal failure, stage 3 (moderate) 05/27/2015    Anticoagulant long-term use     Atherosclerosis of coronary artery bypass graft of native heart without angina pectoris 09/11/2012    3-27-18 [facility] Two vessel coronary artery disease.   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    Bilateral carotid artery disease 02/09/2017    Bleeding from the nose     Bleeding nose 03/21/2018    Cancer     Cataract     CHF (congestive heart failure)     CKD (chronic kidney disease) stage 3, GFR 30-59 ml/min 05/27/2015    Claudication of left lower extremity 09/17/2014    Colon polyp     Encounter for blood transfusion     Gastroesophageal reflux disease without esophagitis 03/19/2018    Gastrointestinal hemorrhage associated with intestinal diverticulosis 04/01/2018    Glaucoma     H/O mechanical aortic valve replacement 09/17/2014    History of gout 09/26/2012    Hyperparathyroidism due to renal insufficiency 07/27/2015    Internal hemorrhoid 04/03/2018    Long term current use of anticoagulant therapy 09/26/2012    Mechanical heart valve present     Metabolic acidosis with normal anion gap and bicarbonate losses 03/20/2018    Mixed hyperlipidemia 09/26/2012    NSTEMI (non-ST elevated myocardial infarction) 03/21/2018    Obesity, diabetes, and hypertension syndrome 02/23/2016    Paroxysmal atrial fibrillation 02/06/2023    PVD (peripheral vascular disease) 09/11/2012    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       Past Surgical History:   Procedure Laterality Date    BACK SURGERY      CARDIAC CATHETERIZATION      CARDIAC VALVE  REPLACEMENT      CARDIAC VALVE SURGERY      CARPAL TUNNEL RELEASE Right 05/19/2020    Procedure: RELEASE, CARPAL TUNNEL;  Surgeon: Rupesh Norris Jr., MD;  Location: [facility];  Service: Plastics;  Laterality: Right;    CATARACT EXTRACTION Left 11/13/2022        COLON SURGERY      COLONOSCOPY N/A 03/31/2017    Procedure: COLONOSCOPY;  Surgeon: Bruno Raymond MD;  Location: [facility] ENDO (4TH FLR);  Service: Endoscopy;  Laterality: N/A;  Patient's wife requesting date.    COLONOSCOPY N/A 04/03/2018    Procedure: COLONOSCOPY;  Surgeon: Bonifacio Pelletier MD;  Location: [facility] ENDO (2ND FLR);  Service: Endoscopy;  Laterality: N/A;    COLONOSCOPY N/A 08/13/2018    Procedure: COLONOSCOPY;  Surgeon: Kam Barba MD;  Location: [facility] ENDO (2ND FLR);  Service: Endoscopy;  Laterality: N/A;  2nd floor: PA pressure 49; hx of moderate-severe valve disease     per Coumadin clinic-Patient can hold 5 days with lovenox bridge       ok to schedule per Katarina    CONTROL OF EPISTAXIS, POSTERIOR, USING NASAL PACKING OR CAUTERIZATION Bilateral 6/18/2024    Procedure: CONTROL OF EPISTAXIS, POSTERIOR, USING NASAL PACKING OR CAUTERIZATION;  Surgeon: Jono Russell MD;  Location: [facility] 2ND FLR;  Service: ENT;  Laterality: Bilateral;    CONTROL OF EPISTAXIS, POSTERIOR, USING NASAL PACKING OR CAUTERIZATION Bilateral 8/8/2024    Procedure: CONTROL OF EPISTAXIS, POSTERIOR, USING NASAL PACKING OR CAUTERIZATION;  Surgeon: Jono Russell MD;  Location: [facility] 2ND FLR;  Service: ENT;  Laterality: Bilateral;  suction bovie, nasopore, endoscopes    CORONARY ANGIOGRAPHY N/A 10/04/2021    Procedure: Left heart cath +/- peripheral angiogram;  Surgeon: Jose Ruiz MD;  Location: [facility] CATH LAB;  Service: Cardiology;  Laterality: N/A;    CORONARY ANGIOGRAPHY N/A 3/2/2023    Procedure: Angiogram, Coronary;  Surgeon: Devon Garnica MD;  Location: [facility] CATH LAB;  Service: Cardiology;  Laterality: N/A;    CORONARY ANGIOPLASTY       CORONARY ARTERY BYPASS GRAFT      CORONARY BYPASS GRAFT ANGIOGRAPHY  10/04/2021    Procedure: Bypass graft study;  Surgeon: Jose Ruiz MD;  Location: [facility] CATH LAB;  Service: Cardiology;;    CORONARY BYPASS GRAFT ANGIOGRAPHY  3/2/2023    Procedure: Bypass graft study;  Surgeon: Devon Garnica MD;  Location: [facility] CATH LAB;  Service: Cardiology;;    ECHOCARDIOGRAM,TRANSESOPHAGEAL N/A 4/14/2023    Procedure: Transesophageal echo (KIRSTEN) intra-procedure log documentation;  Surgeon: [facility] Diagnostic Provider;  Location: [facility] EP LAB;  Service: Cardiology;  Laterality: N/A;    ENDOSCOPIC NASAL CAUTERIZATION Bilateral 11/3/2023    Procedure: CAUTERIZATION, NOSE, ENDOSCOPIC;  Surgeon: Jono Russell MD;  Location: [facility] OR 2ND FLR;  Service: ENT;  Laterality: Bilateral;    ENDOSCOPIC NASAL CAUTERIZATION N/A 12/18/2023    Procedure: CAUTERIZATION, NOSE, ENDOSCOPIC;  Surgeon: Jono Russell MD;  Location: [facility] OR 2ND FLR;  Service: ENT;  Laterality: N/A;    EYE SURGERY      FESS, WITH IMAGING GUIDANCE Left 2/28/2024    Procedure: FESS, WITH IMAGING GUIDANCE;  Surgeon: Jono Russell MD;  Location: [facility] OR 2ND FLR;  Service: ENT;  Laterality: Left;  Scan loaded ENT [employer]str[employer]. CL    FLUOROSCOPY Bilateral 10/29/2024    Procedure: Fluoroscopy;  Surgeon: Sameer Espitia MD;  Location: [facility] CATH LAB/EP;  Service: Cardiology;  Laterality: Bilateral;  fluoroscopy of the mechanical aortic valve    FUNCTIONAL ENDOSCOPIC SINUS SURGERY (FESS) Bilateral 6/14/2023    Procedure: FESS (FUNCTIONAL ENDOSCOPIC SINUS SURGERY);  Surgeon: Jono Russell MD;  Location: [facility] OR 2ND FLR;  Service: ENT;  Laterality: Bilateral;    HERNIA REPAIR      INTRAOCULAR PROSTHESES INSERTION Left 11/13/2022    Procedure: INSERTION, IOL PROSTHESIS;  Surgeon: Alia Mckeon MD;  Location: [facility] OR 1ST FLR;  Service: Ophthalmology;  Laterality: Left;    INTRAOCULAR PROSTHESES INSERTION Right 12/04/2022    Procedure: INSERTION, IOL PROSTHESIS;   Surgeon: Alia Mckeon MD;  Location: [facility] OR 1ST FLR;  Service: Ophthalmology;  Laterality: Right;    LIGATION, ARTERY, ETHMOIDAL Left 2/28/2024    Procedure: LIGATION, ARTERY, ETHMOIDAL;  Surgeon: Jono Russell MD;  Location: [facility] OR 2ND FLR;  Service: ENT;  Laterality: Left;    LIGATION, ARTERY, SPHENOPALATINE, ENDOSCOPIC Bilateral 6/14/2023    Procedure: LIGATION, ARTERY, SPHENOPALATINE, ENDOSCOPIC;  Surgeon: Jono Russell MD;  Location: [facility] OR 2ND FLR;  Service: ENT;  Laterality: Bilateral;    NASAL ENDOSCOPY N/A 8/8/2024    Procedure: ENDOSCOPY, NOSE;  Surgeon: Jono Russell MD;  Location: [facility] OR 2ND FLR;  Service: ENT;  Laterality: N/A;    PHACOEMULSIFICATION OF CATARACT Left 11/13/2022    Procedure: PHACOEMULSIFICATION, CATARACT;  Surgeon: Alia Mckeon MD;  [facility];  Service: Ophthalmology;  Laterality: Left;    PHACOEMULSIFICATION OF CATARACT Right 12/04/2022    Procedure: PHACOEMULSIFICATION, CATARACT;  Surgeon: Alia Mckeon MD;  [facility];  Service: Ophthalmology;  Laterality: Right;    SPINE SURGERY      TRANSESOPHAGEAL ECHOCARDIOGRAPHY N/A 3/22/2023    Procedure: ECHOCARDIOGRAM, TRANSESOPHAGEAL;  Surgeon: [facility] Diagnostic Provider;  Location: [facility] EP LAB;  Service: Anesthesiology;  Laterality: N/A;    TRANSESOPHAGEAL ECHOCARDIOGRAPHY N/A 4/11/2023    Procedure: ECHOCARDIOGRAM, TRANSESOPHAGEAL;  Surgeon: [facility] Diagnostic Provider;  Location: [facility] EP LAB;  Service: Anesthesiology;  Laterality: N/A;    VASECTOMY         Review of patient's allergies indicates:   Allergen Reactions    Lisinopril     Losartan      Intolerance- elevates potassium level       No current facility-administered medications on file prior to encounter.     Current Outpatient Medications on File Prior to Encounter   Medication Sig    acetaminophen (TYLENOL) 500 MG tablet Take 500 mg by mouth daily as needed for Pain.    albuterol (VENTOLIN HFA) 90 mcg/actuation inhaler  Inhale 2 puffs into the lungs every 6 (six) hours as needed for Wheezing. Rescue    allopurinoL (ZYLOPRIM) 100 MG tablet Take 1 tablet (100 mg total) by mouth once daily.    bumetanide (BUMEX) 1 MG tablet Take 2 tablets (2 mg total) by mouth every other day.    ferrous gluconate (FERGON) 324 MG tablet TAKE 1 TABLET EVERY DAY WITH BREAKFAST    isosorbide mononitrate (IMDUR) 60 MG 24 hr tablet Take 1 tablet (60 mg total) by mouth every evening.    omega-3 fatty acids/fish oil (FISH OIL-OMEGA-3 FATTY ACIDS) 300-1,000 mg capsule Take 1 capsule by mouth once daily.    oxymetazoline (AFRIN) 0.05 % nasal spray 2 sprays by Nasal route as needed for Congestion (nose bleeds).    rosuvastatin (CRESTOR) 40 MG Tab TAKE 1 TABLET EVERY EVENING.    sodium chloride (SALINE NASAL) 0.65 % nasal spray Use 2 sprays by Nasal route every 4 hours. Apply into nasal cavities daily with nightly application of vaseline/aquaphor to anterior nares. Keep head of bed elevated.    traZODone (DESYREL) 50 MG tablet Take 1 tablet (50 mg total) by mouth every evening.    warfarin (COUMADIN) 5 MG tablet TAKE 1/2 TABLET (2.5MG) SATURDAY, THEN TAKE 1 TABLET (5MG) ALL OTHER DAYS OR AS INSTRUCTED BY COUMADIN CLINIC     Family History       Problem Relation (Age of Onset)    Alcohol abuse Father    Diabetes Brother    Heart disease Mother, Father, Brother, Sister    Heart failure Mother, Father, Brother    No Known Problems Sister, Maternal Grandmother, Maternal Grandfather, Paternal Grandmother, Paternal Grandfather, Maternal Aunt, Maternal Uncle, Paternal Aunt, Paternal Uncle          Tobacco Use    Smoking status: Former     Current packs/day: 0.00     Average packs/day: 1 pack/day for 20.0 years (20.0 ttl pk-yrs)     Types: Cigarettes     Start date: 1960     Quit date: 1980     Years since quittin.2     Passive exposure: Never    Smokeless tobacco: Never   Substance and Sexual Activity    Alcohol use: No    Drug use: No    Sexual  activity: Not Currently     Partners: Female     Review of Systems   Constitutional:  Positive for activity change and fatigue. Negative for chills and fever.   HENT:  Negative for trouble swallowing.    Eyes:  Negative for photophobia and visual disturbance.   Respiratory:  Positive for shortness of breath. Negative for chest tightness and wheezing.    Cardiovascular:  Positive for palpitations. Negative for chest pain and leg swelling.   Gastrointestinal:  Negative for abdominal pain, constipation, diarrhea, nausea and vomiting.   Genitourinary:  Negative for dysuria, frequency, hematuria and urgency.   Musculoskeletal:  Negative for arthralgias, back pain and gait problem.   Skin:  Negative for color change and rash.   Neurological:  Positive for dizziness and light-headedness. Negative for syncope, weakness, numbness and headaches.   Psychiatric/Behavioral:  Negative for agitation and confusion. The patient is not nervous/anxious.      Objective:     Vital Signs (Most Recent):  Temp: 97.8 °F (36.6 °C) (12/13/24 1501)  Pulse: 63 (12/13/24 1520)  Resp: 18 (12/13/24 1520)  BP: (!) 156/72 (ENT at bedside suctioning pt.) (12/13/24 1501)  SpO2: 100 % (12/13/24 1520) Vital Signs (24h Range):  Temp:  [97.4 °F (36.3 °C)-97.8 °F (36.6 °C)] 97.8 °F (36.6 °C)  Pulse:  [63-85] 63  Resp:  [16-20] 18  SpO2:  [100 %] 100 %  BP: (137-156)/(56-72) 156/72     Weight: 70.8 kg (156 lb)  Body mass index is 25.18 kg/m².     Physical Exam  Vitals and nursing note reviewed.   Constitutional:       General: He is not in acute distress.     Appearance: He is well-developed. He is ill-appearing.   HENT:      Head: Normocephalic and atraumatic.      Nose:      Right Nostril: No epistaxis.      Left Nostril: Epistaxis present.      Comments: L nasal packing in place with active bleeding around the packing, afrin sprayed, pressure applied until resolved  ENT notified on my exam     Mouth/Throat:      Mouth: Mucous membranes are dry.   Eyes:       Extraocular Movements: Extraocular movements intact.      Conjunctiva/sclera: Conjunctivae normal.   Cardiovascular:      Rate and Rhythm: Normal rate and regular rhythm.      Heart sounds: Normal heart sounds.   Pulmonary:      Effort: Pulmonary effort is normal. No respiratory distress.      Breath sounds: Normal breath sounds. No wheezing.   Abdominal:      General: Bowel sounds are normal. There is no distension.      Palpations: Abdomen is soft.      Tenderness: There is no abdominal tenderness.   Musculoskeletal:         General: No tenderness. Normal range of motion.      Cervical back: Normal range of motion and neck supple.   Skin:     General: Skin is warm and dry.      Capillary Refill: Capillary refill takes less than 2 seconds.      Coloration: Skin is pale.      Findings: No rash.   Neurological:      Mental Status: He is alert and oriented to person, place, and time.      Cranial Nerves: No cranial nerve deficit.      Sensory: No sensory deficit.      Coordination: Coordination normal.   Psychiatric:         Behavior: Behavior normal.         Thought Content: Thought content normal.         Judgment: Judgment normal.                Significant Labs: All pertinent labs within the past 24 hours have been reviewed.    Significant Imaging: I have reviewed all pertinent imaging results/findings within the past 24 hours.

## 2024-12-13 NOTE — ASSESSMENT & PLAN NOTE
Chronic anticoagulation  - PT/INR 24/2.4 on admission  - Hold home warfarin for now given active bleeding

## 2024-12-13 NOTE — CONSULTS
[facility] - Emergency Dept  Otorhinolaryngology-Head & Neck Surgery  Consult Note    Patient Name: Dariel Urbina  MRN: [id number]  Code Status: Full Code  Admission Date: 12/13/2024  Hospital Length of Stay: 0 days  Attending Physician: Cody Carrington MD  Primary Care Provider: Devon Langston Jr., MD    Patient information was obtained from patient and ER records.     Inpatient consult to ENT  Consult performed by: Ying Nath MD  Consult ordered by: Vilma Dowd PA-C        Subjective:     Chief Complaint/Reason for Admission: epistaxis     History of Present Illness: Dariel is a 84 y.o. with history of recurrent epistaxis, on Coumadin for heart valve, who is s/p bilateral SP ligation followed by bilateral embolization, cautery on the left septum for persistent bleeding on 11/03/2023 and 12/18/23. He recently underwent left AEA ligation on 2/28/24 and underwent nasal endoscopy and control of epistaxis on 6/18/24.  He then had repeat embolization on 08/06/24.     He reports intermitted nose bleeds for the last 3 days which worsened this morning and did not stop with pressure and Afrin. ED attempted nasal packing without improvement. ENT consulted for management of epistaxis.    Medications:  Continuous Infusions:  Scheduled Meds:   [START ON 12/14/2024] allopurinoL  100 mg Oral Daily    atorvastatin  80 mg Oral Daily    [START ON 12/14/2024] bumetanide  2 mg Oral Every other day    [START ON 12/14/2024] ferrous gluconate  324 mg Oral Daily with breakfast    isosorbide mononitrate  60 mg Oral QHS     PRN Meds:  Current Facility-Administered Medications:     0.9%  NaCl infusion (for blood administration), , Intravenous, Q24H PRN    acetaminophen, 1,000 mg, Oral, Q6H PRN    aluminum-magnesium hydroxide-simethicone, 30 mL, Oral, QID PRN    bisacodyL, 10 mg, Rectal, Daily PRN    dextrose 10%, 12.5 g, Intravenous, PRN    dextrose 10%, 25 g, Intravenous, PRN    glucagon (human recombinant), 1 mg,  Intramuscular, PRN    glucose, 16 g, Oral, PRN    glucose, 24 g, Oral, PRN    insulin aspart U-100, 0-5 Units, Subcutaneous, QID (AC + HS) PRN    naloxone, 0.02 mg, Intravenous, PRN    ondansetron, 8 mg, Oral, Q8H PRN    polyethylene glycol, 17 g, Oral, BID PRN    prochlorperazine, 5 mg, Intravenous, Q6H PRN    simethicone, 1 tablet, Oral, QID PRN    sodium chloride 0.9%, 10 mL, Intravenous, Q12H PRN    traZODone, 50 mg, Oral, Nightly PRN     No current facility-administered medications on file prior to encounter.     Current Outpatient Medications on File Prior to Encounter   Medication Sig    acetaminophen (TYLENOL) 500 MG tablet Take 500 mg by mouth daily as needed for Pain.    albuterol (VENTOLIN HFA) 90 mcg/actuation inhaler Inhale 2 puffs into the lungs every 6 (six) hours as needed for Wheezing. Rescue    allopurinoL (ZYLOPRIM) 100 MG tablet Take 1 tablet (100 mg total) by mouth once daily.    bumetanide (BUMEX) 1 MG tablet Take 2 tablets (2 mg total) by mouth every other day.    ferrous gluconate (FERGON) 324 MG tablet TAKE 1 TABLET EVERY DAY WITH BREAKFAST    isosorbide mononitrate (IMDUR) 60 MG 24 hr tablet Take 1 tablet (60 mg total) by mouth every evening.    omega-3 fatty acids/fish oil (FISH OIL-OMEGA-3 FATTY ACIDS) 300-1,000 mg capsule Take 1 capsule by mouth once daily.    oxymetazoline (AFRIN) 0.05 % nasal spray 2 sprays by Nasal route as needed for Congestion (nose bleeds).    rosuvastatin (CRESTOR) 40 MG Tab TAKE 1 TABLET EVERY EVENING.    sodium chloride (SALINE NASAL) 0.65 % nasal spray Use 2 sprays by Nasal route every 4 hours. Apply into nasal cavities daily with nightly application of vaseline/aquaphor to anterior nares. Keep head of bed elevated.    traZODone (DESYREL) 50 MG tablet Take 1 tablet (50 mg total) by mouth every evening.    warfarin (COUMADIN) 5 MG tablet TAKE 1/2 TABLET (2.5MG) SATURDAY, THEN TAKE 1 TABLET (5MG) ALL OTHER DAYS OR AS INSTRUCTED BY COUMADIN CLINIC       Review of  patient's allergies indicates:   Allergen Reactions    Lisinopril     Losartan      Intolerance- elevates potassium level       Past Medical History:   Diagnosis Date    Anemia of chronic renal failure, stage 3 (moderate) 05/27/2015    Anticoagulant long-term use     Atherosclerosis of coronary artery bypass graft of native heart without angina pectoris 09/11/2012    3-27-18 [facility] Two vessel coronary artery disease.   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    Bilateral carotid artery disease 02/09/2017    Bleeding from the nose     Bleeding nose 03/21/2018    Cancer     Cataract     CHF (congestive heart failure)     CKD (chronic kidney disease) stage 3, GFR 30-59 ml/min 05/27/2015    Claudication of left lower extremity 09/17/2014    Colon polyp     Encounter for blood transfusion     Gastroesophageal reflux disease without esophagitis 03/19/2018    Gastrointestinal hemorrhage associated with intestinal diverticulosis 04/01/2018    Glaucoma     H/O mechanical aortic valve replacement 09/17/2014    History of gout 09/26/2012    Hyperparathyroidism due to renal insufficiency 07/27/2015    Internal hemorrhoid 04/03/2018    Long term current use of anticoagulant therapy 09/26/2012    Mechanical heart valve present     Metabolic acidosis with normal anion gap and bicarbonate losses 03/20/2018    Mixed hyperlipidemia 09/26/2012    NSTEMI (non-ST elevated myocardial infarction) 03/21/2018    Obesity, diabetes, and hypertension syndrome 02/23/2016    Paroxysmal atrial fibrillation 02/06/2023    PVD (peripheral vascular disease) 09/11/2012    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    Volume overload  5/30/2024     Past Surgical History:   Procedure Laterality Date    BACK SURGERY      CARDIAC CATHETERIZATION      CARDIAC VALVE REPLACEMENT      CARDIAC VALVE SURGERY      CARPAL TUNNEL RELEASE Right 05/19/2020    Procedure: RELEASE, CARPAL TUNNEL;  Surgeon: Rupesh Norris Jr., MD;  Location: [facility];  Service: Plastics;  Laterality: Right;    CATARACT EXTRACTION Left 11/13/2022        COLON SURGERY      COLONOSCOPY N/A 03/31/2017    Procedure: COLONOSCOPY;  Surgeon: Bruno Raymond MD;  Location: [facility] ENDO (4TH FLR);  Service: Endoscopy;  Laterality: N/A;  Patient's wife requesting date.    COLONOSCOPY N/A 04/03/2018    Procedure: COLONOSCOPY;  Surgeon: Bonifacio Pelletier MD;  Location: [facility] ENDO (2ND FLR);  Service: Endoscopy;  Laterality: N/A;    COLONOSCOPY N/A 08/13/2018    Procedure: COLONOSCOPY;  Surgeon: Kam Barba MD;  Location: [facility] ENDO (2ND FLR);  Service: Endoscopy;  Laterality: N/A;  2nd floor: PA pressure 49; hx of moderate-severe valve disease     per Coumadin clinic-Patient can hold 5 days with lovenox bridge       ok to schedule per Katarina    CONTROL OF EPISTAXIS, POSTERIOR, USING NASAL PACKING OR CAUTERIZATION Bilateral 6/18/2024    Procedure: CONTROL OF EPISTAXIS, POSTERIOR, USING NASAL PACKING OR CAUTERIZATION;  Surgeon: Jono Russell MD;  Location: [facility] 2ND FLR;  Service: ENT;  Laterality: Bilateral;    CONTROL OF EPISTAXIS, POSTERIOR, USING NASAL PACKING OR CAUTERIZATION Bilateral 8/8/2024    Procedure: CONTROL OF EPISTAXIS, POSTERIOR, USING NASAL PACKING OR CAUTERIZATION;  Surgeon: Jono Russell MD;  Location: [facility] 2ND FLR;  Service: ENT;  Laterality: Bilateral;  suction bovie, nasopore, endoscopes    CORONARY ANGIOGRAPHY N/A 10/04/2021    Procedure: Left heart cath +/- peripheral angiogram;  Surgeon: Jose Ruiz MD;  Location: [facility] CATH LAB;  Service: Cardiology;  Laterality: N/A;    CORONARY ANGIOGRAPHY N/A 3/2/2023    Procedure: Angiogram, Coronary;   Surgeon: Devon Garnica MD;  Location: [facility] CATH LAB;  Service: Cardiology;  Laterality: N/A;    CORONARY ANGIOPLASTY      CORONARY ARTERY BYPASS GRAFT      CORONARY BYPASS GRAFT ANGIOGRAPHY  10/04/2021    Procedure: Bypass graft study;  Surgeon: Jose Ruiz MD;  Location: [facility] CATH LAB;  Service: Cardiology;;    CORONARY BYPASS GRAFT ANGIOGRAPHY  3/2/2023    Procedure: Bypass graft study;  Surgeon: Devon Garnica MD;  Location: [facility] CATH LAB;  Service: Cardiology;;    ECHOCARDIOGRAM,TRANSESOPHAGEAL N/A 4/14/2023    Procedure: Transesophageal echo (KIRSTEN) intra-procedure log documentation;  Surgeon: [facility] Diagnostic Provider;  Location: [facility] EP LAB;  Service: Cardiology;  Laterality: N/A;    ENDOSCOPIC NASAL CAUTERIZATION Bilateral 11/3/2023    Procedure: CAUTERIZATION, NOSE, ENDOSCOPIC;  Surgeon: Jono Russell MD;  Location: [facility] OR 2ND FLR;  Service: ENT;  Laterality: Bilateral;    ENDOSCOPIC NASAL CAUTERIZATION N/A 12/18/2023    Procedure: CAUTERIZATION, NOSE, ENDOSCOPIC;  Surgeon: Jono Russell MD;  Location: [facility] OR 2ND FLR;  Service: ENT;  Laterality: N/A;    EYE SURGERY      FESS, WITH IMAGING GUIDANCE Left 2/28/2024    Procedure: FESS, WITH IMAGING GUIDANCE;  Surgeon: Jono Russell MD;  Location: [facility] OR 2ND FLR;  Service: ENT;  Laterality: Left;  Scan loaded ENT [employer]. CL    FLUOROSCOPY Bilateral 10/29/2024    Procedure: Fluoroscopy;  Surgeon: Sameer Espitia MD;  Location: [facility] CATH LAB/EP;  Service: Cardiology;  Laterality: Bilateral;  fluoroscopy of the mechanical aortic valve    FUNCTIONAL ENDOSCOPIC SINUS SURGERY (FESS) Bilateral 6/14/2023    Procedure: FESS (FUNCTIONAL ENDOSCOPIC SINUS SURGERY);  Surgeon: Jono Russell MD;  Location: [facility] OR 2ND FLR;  Service: ENT;  Laterality: Bilateral;    HERNIA REPAIR      INTRAOCULAR PROSTHESES INSERTION Left 11/13/2022    Procedure: INSERTION, IOL PROSTHESIS;  Surgeon: Alia Mckeon MD;  Location: [facility] OR 1ST FLR;  Service:  Ophthalmology;  Laterality: Left;    INTRAOCULAR PROSTHESES INSERTION Right 12/04/2022    Procedure: INSERTION, IOL PROSTHESIS;  Surgeon: Alia Mckeon MD;  Location: [facility] OR 1ST FLR;  Service: Ophthalmology;  Laterality: Right;    LIGATION, ARTERY, ETHMOIDAL Left 2/28/2024    Procedure: LIGATION, ARTERY, ETHMOIDAL;  Surgeon: Jono Russell MD;  Location: [facility] OR 2ND FLR;  Service: ENT;  Laterality: Left;    LIGATION, ARTERY, SPHENOPALATINE, ENDOSCOPIC Bilateral 6/14/2023    Procedure: LIGATION, ARTERY, SPHENOPALATINE, ENDOSCOPIC;  Surgeon: Jono Russell MD;  Location: [facility] OR 2ND FLR;  Service: ENT;  Laterality: Bilateral;    NASAL ENDOSCOPY N/A 8/8/2024    Procedure: ENDOSCOPY, NOSE;  Surgeon: Jono Russell MD;  [facility];  Service: ENT;  Laterality: N/A;    PHACOEMULSIFICATION OF CATARACT Left 11/13/2022    Procedure: PHACOEMULSIFICATION, CATARACT;  Surgeon: Alia Mckeon MD;  [facility];  Service: Ophthalmology;  Laterality: Left;    PHACOEMULSIFICATION OF CATARACT Right 12/04/2022    Procedure: PHACOEMULSIFICATION, CATARACT;  Surgeon: Alia Mckeon MD;  [facility];  Service: Ophthalmology;  Laterality: Right;    SPINE SURGERY      TRANSESOPHAGEAL ECHOCARDIOGRAPHY N/A 3/22/2023    Procedure: ECHOCARDIOGRAM, TRANSESOPHAGEAL;  Surgeon: [facility] Diagnostic Provider;  Location: [facility] EP LAB;  Service: Anesthesiology;  Laterality: N/A;    TRANSESOPHAGEAL ECHOCARDIOGRAPHY N/A 4/11/2023    Procedure: ECHOCARDIOGRAM, TRANSESOPHAGEAL;  Surgeon: [facility] Diagnostic Provider;  Location: [facility] EP LAB;  Service: Anesthesiology;  Laterality: N/A;    VASECTOMY       Family History       Problem Relation (Age of Onset)    Alcohol abuse Father    Diabetes Brother    Heart disease Mother, Father, Brother, Sister    Heart failure Mother, Father, Brother    No Known Problems Sister, Maternal Grandmother, Maternal Grandfather, Paternal Grandmother, Paternal Grandfather,  Maternal Aunt, Maternal Uncle, Paternal Aunt, Paternal Uncle          Tobacco Use    Smoking status: Former     Current packs/day: 0.00     Average packs/day: 1 pack/day for 20.0 years (20.0 ttl pk-yrs)     Types: Cigarettes     Start date: 1960     Quit date: 1980     Years since quittin.2     Passive exposure: Never    Smokeless tobacco: Never   Substance and Sexual Activity    Alcohol use: No    Drug use: No    Sexual activity: Not Currently     Partners: Female     Review of Systems  Objective:     Vital Signs (Most Recent):  Temp: 97.8 °F (36.6 °C) (24 1501)  Pulse: 63 (24 1520)  Resp: 18 (24 1520)  BP: (!) 156/72 (ENT at bedside suctioning pt.) (24 1501)  SpO2: 100 % (24 1520) Vital Signs (24h Range):  Temp:  [97.4 °F (36.3 °C)-97.8 °F (36.6 °C)] 97.8 °F (36.6 °C)  Pulse:  [63-85] 63  Resp:  [16-20] 18  SpO2:  [100 %] 100 %  BP: (137-156)/(56-72) 156/72     Weight: 70.8 kg (156 lb)  Body mass index is 25.18 kg/m².         Physical Exam  NAD  Head atraumatic   EOMI   Auricles WNL AU  Bilateral nares with dry mucosa, septal perforation anteriorly (~1.5cm), bilateral merocel packing in place with fibrillar around packing. No active bleeding.  Clot in posterior oropharynx   Neck soft  Unlabored breathing, no stridor         Significant Labs:  CBC:   Recent Labs   Lab 24  1252   WBC 5.40   RBC 1.96*   HGB 6.0*   HCT 19.5*      *   MCH 30.6   MCHC 30.8*     CMP:   Recent Labs   Lab 24  1252   *   CALCIUM 9.6   ALBUMIN 3.1*   PROT 6.6      K 4.5   CO2 20*      BUN 58*   CREATININE 2.6*   ALKPHOS 64   ALT 11   AST 15   BILITOT 0.4       Significant Diagnostics:  None    Assessment/Plan:     * Recurrent epistaxis  85 yo male with heart valve on [place] and recurrent epistaxis presents to ER due to severe epistaxis. Currently packed with bilateral merocels. Hgb 6. Getting transfused with pRBC and will be admitted to medicine for   close monitoring.    - Maintain bilateral merocel  - Please place patient on anti-staph antibiotics while packing is in place  - Afrin to bilateral nares q8h for the next 48h  - May place mustache dressing under nose for trickling, ok if dressing becomes saturated with some red/brown drainage  - Nasal saline q4h while packing in place, even on side with nasal packing. Can continue nasal saline sprays to keep mucosa moist to prevent future bleeding.  - Keep head of bed elevated  - Apply Afrin to affected nasal cavity if epistaxis recurs, please see detailed instructions below   - Please call ENT with questions  - Remainder of care per primary team        PATIENT INSTRUCTIONS:    Nasal precautions  - DO NOT blow your nose for 2 weeks. The only exception is that you may lightly blow your nose after using a sinus rinse.  - Sneeze/cough with mouth open  - Avoid irritating substances that might make you sneeze, such as dust, chalk, harsh chemicals, and allergic triggers. This might also include spicy foods.  - Do not smoke   - Avoid flying or swimming for 2 weeks.    - Avoid all heavy lifting, straining or bending for 2 weeks.   - Avoid semi-contact sports or vigorous exercising for 3-4 weeks.      Aftercare/ moisturizing recommendations to decrease frequency of nosebleeds:  - Daily nasal saline sprays/rinses  - Nightly application of vaseline/aquaphor to anterior nares  - Room humidification  - Avoidance of nasal cannula if possible (always with humidification and please use face tent, nasal cup, facemask if possible)  Management of medical conditions contributing to epistaxis (coagulopathy, hypertension, etc.)  - Humidification of CPAP appliance if applicable    If rebleeding occurs:   - Apply Afrin liberally to both nostrils  - Apply pressure over the soft part of the nose for 15 minutes (clip or digital pressure, important to have pressure over soft part of nose and consistent pressure (do not release pressure at any  point))  - Instruct patient to lean forward, avoid swallowing blood. This can cause nausea and vomiting  - Can repeat these steps above up to 3 times  - Call/reconsult ENT or return to the nearest emergency department if this is unsuccessful        VTE Risk Mitigation (From admission, onward)           Ordered     IP VTE HIGH RISK PATIENT  Once         12/13/24 1517     Place sequential compression device  Until discontinued         12/13/24 1517                    Thank you for your consult. I will follow-up with patient. Please contact us if you have any additional questions.    Ying Nath MD  Otorhinolaryngology-Head & Neck Surgery  [facility] - Emergency Dept

## 2024-12-13 NOTE — TELEPHONE ENCOUNTER
----- Message from Chetna sent at 12/13/2024 10:28 AM CST -----  Regarding: Nose Bleed  Contact: Ameena/wife [phone]  Type:  Needs Medical Advice    Who Called: Ameena  Symptoms (please be specific): severe nose bleed   How long has patient had these symptoms:  three days  Pharmacy name and phone #:  n/a  Would the patient rather a call back or a response via [facility]? call  Best Call Back Number: [phone]  Additional Information: Calling to speak with provider/nurse regarding plan of care, severe nose bleed. Please call and discuss with patient as soon as possible.

## 2024-12-13 NOTE — H&P
[facility] - Emergency Dept  [facility] Medicine  History & Physical    Patient Name: Dariel Urbina  MRN: [id number]  Patient Class: OP- Observation  Admission Date: 12/13/2024  Attending Physician: Cody Carrington MD   Primary Care Provider: Devon Langston Jr., MD         Patient information was obtained from patient, past medical records, and ER records.     Subjective:     Principal Problem:Recurrent epistaxis    Chief Complaint:   Chief Complaint   Patient presents with    Epistaxis     Bleeding for 3days on coumadin, hgb was 7        HPI: Dariel Urbina is a 84 y.o. M with HTN, HLD, combined CHF, paroxysmal A fib, CAD s/p CABG and stent placement,  h/o mechanical aortic valve replacement on warfarin, T2DM, stage IV CKD, gout, and recurrent epistaxis s/p bilateral SP ligation followed by bilateral embolization, cautery on the left septum x2 for persistent bleeding (11/2023 & 12/2023), left AEA ligation (2/2024) and repeat embolization in 08/2024 presenting with recurrent epistaxis. The patient reports his most recent episode of epistaxis started ~3-4 days ago and has been intermittent with persistent worsening and did not stop with persistent pressure and afrin. Pt endorses associated dizziness, lightheadedness, palpitations, and SOB but otherwise denies fever, chills, chest pain, abdominal pain, N/V/D, dysuria, leg swelling or pain, HA, or syncope.       In the ED: Hypertensive to 170s/70s, o/w VSSAF. CBC with Hgb 6.0 (from baseline ~8), no leukocytosis. PT 24/INR 2.4. CMP with Cr 2.6 (from baseline 2.2-2.4). ED provider attempted nasal packing without significant improvement. The patient was given 1u pRBCs, afrin, and NS bolus. ENT consulted & patient was placed in observation for further management.     Past Medical History:   Diagnosis Date    Anemia of chronic renal failure, stage 3 (moderate) 05/27/2015    Anticoagulant long-term use     Atherosclerosis of coronary artery bypass graft of native  heart without angina pectoris 09/11/2012    3-27-18 [facility] Two vessel coronary artery disease.   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    Bilateral carotid artery disease 02/09/2017    Bleeding from the nose     Bleeding nose 03/21/2018    Cancer     Cataract     CHF (congestive heart failure)     CKD (chronic kidney disease) stage 3, GFR 30-59 ml/min 05/27/2015    Claudication of left lower extremity 09/17/2014    Colon polyp     Encounter for blood transfusion     Gastroesophageal reflux disease without esophagitis 03/19/2018    Gastrointestinal hemorrhage associated with intestinal diverticulosis 04/01/2018    Glaucoma     H/O mechanical aortic valve replacement 09/17/2014    History of gout 09/26/2012    Hyperparathyroidism due to renal insufficiency 07/27/2015    Internal hemorrhoid 04/03/2018    Long term current use of anticoagulant therapy 09/26/2012    Mechanical heart valve present     Metabolic acidosis with normal anion gap and bicarbonate losses 03/20/2018    Mixed hyperlipidemia 09/26/2012    NSTEMI (non-ST elevated myocardial infarction) 03/21/2018    Obesity, diabetes, and hypertension syndrome 02/23/2016    Paroxysmal atrial fibrillation 02/06/2023    PVD (peripheral vascular disease) 09/11/2012    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       Past Surgical History:   Procedure Laterality Date    BACK SURGERY      CARDIAC CATHETERIZATION      CARDIAC VALVE REPLACEMENT      CARDIAC VALVE SURGERY      CARPAL TUNNEL RELEASE Right 05/19/2020    Procedure: RELEASE, CARPAL TUNNEL;  Surgeon: Rupesh Norris Jr., MD;  Location: [facility];  Service: Plastics;   Laterality: Right;    CATARACT EXTRACTION Left 11/13/2022        COLON SURGERY      COLONOSCOPY N/A 03/31/2017    Procedure: COLONOSCOPY;  Surgeon: Bruno Raymond MD;  Location: [facility] ENDO (4TH FLR);  Service: Endoscopy;  Laterality: N/A;  Patient's wife requesting date.    COLONOSCOPY N/A 04/03/2018    Procedure: COLONOSCOPY;  Surgeon: Bonifacio Pelletier MD;  Location: [facility] ENDO (2ND FLR);  Service: Endoscopy;  Laterality: N/A;    COLONOSCOPY N/A 08/13/2018    Procedure: COLONOSCOPY;  Surgeon: Kam Barba MD;  Location: [facility] ENDO (2ND FLR);  Service: Endoscopy;  Laterality: N/A;  2nd floor: PA pressure 49; hx of moderate-severe valve disease     per Coumadin clinic-Patient can hold 5 days with lovenox bridge       ok to schedule per Katarina    CONTROL OF EPISTAXIS, POSTERIOR, USING NASAL PACKING OR CAUTERIZATION Bilateral 6/18/2024    Procedure: CONTROL OF EPISTAXIS, POSTERIOR, USING NASAL PACKING OR CAUTERIZATION;  Surgeon: Jono Russell MD;  Location: [facility] OR 2ND FLR;  Service: ENT;  Laterality: Bilateral;    CONTROL OF EPISTAXIS, POSTERIOR, USING NASAL PACKING OR CAUTERIZATION Bilateral 8/8/2024    Procedure: CONTROL OF EPISTAXIS, POSTERIOR, USING NASAL PACKING OR CAUTERIZATION;  Surgeon: Jono Russell MD;  Location: [facility] OR 2ND FLR;  Service: ENT;  Laterality: Bilateral;  suction bovie, nasopore, endoscopes    CORONARY ANGIOGRAPHY N/A 10/04/2021    Procedure: Left heart cath +/- peripheral angiogram;  Surgeon: Jose Ruiz MD;  Location: [facility] CATH LAB;  Service: Cardiology;  Laterality: N/A;    CORONARY ANGIOGRAPHY N/A 3/2/2023    Procedure: Angiogram, Coronary;  Surgeon: Devon Garnica MD;  Location: [facility] CATH LAB;  Service: Cardiology;  Laterality: N/A;    CORONARY ANGIOPLASTY      CORONARY ARTERY BYPASS GRAFT      CORONARY BYPASS GRAFT ANGIOGRAPHY  10/04/2021    Procedure: Bypass graft study;  Surgeon: Jose Ruiz MD;  Location: [facility] CATH LAB;  Service: Cardiology;;    CORONARY  BYPASS GRAFT ANGIOGRAPHY  3/2/2023    Procedure: Bypass graft study;  Surgeon: Devon Garnica MD;  Location: [facility] CATH LAB;  Service: Cardiology;;    ECHOCARDIOGRAM,TRANSESOPHAGEAL N/A 4/14/2023    Procedure: Transesophageal echo (KIRSTEN) intra-procedure log documentation;  Surgeon: [facility] Diagnostic Provider;  Location: [facility] EP LAB;  Service: Cardiology;  Laterality: N/A;    ENDOSCOPIC NASAL CAUTERIZATION Bilateral 11/3/2023    Procedure: CAUTERIZATION, NOSE, ENDOSCOPIC;  Surgeon: Jono Russell MD;  Location: [facility] OR 2ND FLR;  Service: ENT;  Laterality: Bilateral;    ENDOSCOPIC NASAL CAUTERIZATION N/A 12/18/2023    Procedure: CAUTERIZATION, NOSE, ENDOSCOPIC;  Surgeon: Jono Russell MD;  Location: [facility] OR 2ND FLR;  Service: ENT;  Laterality: N/A;    EYE SURGERY      FESS, WITH IMAGING GUIDANCE Left 2/28/2024    Procedure: FESS, WITH IMAGING GUIDANCE;  Surgeon: Jono Russell MD;  Location: [facility] OR 2ND FLR;  Service: ENT;  Laterality: Left;  Scan loaded ENT [employer]. CL    FLUOROSCOPY Bilateral 10/29/2024    Procedure: Fluoroscopy;  Surgeon: Sameer Espitia MD;  Location: [facility] CATH LAB/EP;  Service: Cardiology;  Laterality: Bilateral;  fluoroscopy of the mechanical aortic valve    FUNCTIONAL ENDOSCOPIC SINUS SURGERY (FESS) Bilateral 6/14/2023    Procedure: FESS (FUNCTIONAL ENDOSCOPIC SINUS SURGERY);  Surgeon: Jono Russell MD;  Location: [facility] OR 2ND FLR;  Service: ENT;  Laterality: Bilateral;    HERNIA REPAIR      INTRAOCULAR PROSTHESES INSERTION Left 11/13/2022    Procedure: INSERTION, IOL PROSTHESIS;  Surgeon: Alia Mckeon MD;  Location: [facility] OR 1ST FLR;  Service: Ophthalmology;  Laterality: Left;    INTRAOCULAR PROSTHESES INSERTION Right 12/04/2022    Procedure: INSERTION, IOL PROSTHESIS;  Surgeon: Alia Mckeon MD;  Location: [facility] OR 1ST FLR;  Service: Ophthalmology;  Laterality: Right;    LIGATION, ARTERY, ETHMOIDAL Left 2/28/2024    Procedure: LIGATION, ARTERY, ETHMOIDAL;  Surgeon: Drew  Jono VALLADARES MD;  [facility];  Service: ENT;  Laterality: Left;    LIGATION, ARTERY, SPHENOPALATINE, ENDOSCOPIC Bilateral 6/14/2023    Procedure: LIGATION, ARTERY, SPHENOPALATINE, ENDOSCOPIC;  Surgeon: Jono Russell MD;  [facility];  Service: ENT;  Laterality: Bilateral;    NASAL ENDOSCOPY N/A 8/8/2024    Procedure: ENDOSCOPY, NOSE;  Surgeon: Jono Russell MD;  [facility];  Service: ENT;  Laterality: N/A;    PHACOEMULSIFICATION OF CATARACT Left 11/13/2022    Procedure: PHACOEMULSIFICATION, CATARACT;  Surgeon: Alia Mckeon MD;  [facility];  Service: Ophthalmology;  Laterality: Left;    PHACOEMULSIFICATION OF CATARACT Right 12/04/2022    Procedure: PHACOEMULSIFICATION, CATARACT;  Surgeon: Alia Mckeon MD;  Location: [address];  Service: Ophthalmology;  Laterality: Right;    SPINE SURGERY      TRANSESOPHAGEAL ECHOCARDIOGRAPHY N/A 3/22/2023    Procedure: ECHOCARDIOGRAM, TRANSESOPHAGEAL;  Surgeon: [facility] Diagnostic Provider;  Location: [facility] EP LAB;  Service: Anesthesiology;  Laterality: N/A;    TRANSESOPHAGEAL ECHOCARDIOGRAPHY N/A 4/11/2023    Procedure: ECHOCARDIOGRAM, TRANSESOPHAGEAL;  Surgeon: [facility] Diagnostic Provider;  Location: [facility] EP LAB;  Service: Anesthesiology;  Laterality: N/A;    VASECTOMY         Review of patient's allergies indicates:   Allergen Reactions    Lisinopril     Losartan      Intolerance- elevates potassium level       No current facility-administered medications on file prior to encounter.     Current Outpatient Medications on File Prior to Encounter   Medication Sig    acetaminophen (TYLENOL) 500 MG tablet Take 500 mg by mouth daily as needed for Pain.    albuterol (VENTOLIN HFA) 90 mcg/actuation inhaler Inhale 2 puffs into the lungs every 6 (six) hours as needed for Wheezing. Rescue    allopurinoL (ZYLOPRIM) 100 MG tablet Take 1 tablet (100 mg total) by mouth once daily.    bumetanide (BUMEX) 1 MG tablet Take 2  tablets (2 mg total) by mouth every other day.    ferrous gluconate (FERGON) 324 MG tablet TAKE 1 TABLET EVERY DAY WITH BREAKFAST    isosorbide mononitrate (IMDUR) 60 MG 24 hr tablet Take 1 tablet (60 mg total) by mouth every evening.    omega-3 fatty acids/fish oil (FISH OIL-OMEGA-3 FATTY ACIDS) 300-1,000 mg capsule Take 1 capsule by mouth once daily.    oxymetazoline (AFRIN) 0.05 % nasal spray 2 sprays by Nasal route as needed for Congestion (nose bleeds).    rosuvastatin (CRESTOR) 40 MG Tab TAKE 1 TABLET EVERY EVENING.    sodium chloride (SALINE NASAL) 0.65 % nasal spray Use 2 sprays by Nasal route every 4 hours. Apply into nasal cavities daily with nightly application of vaseline/aquaphor to anterior nares. Keep head of bed elevated.    traZODone (DESYREL) 50 MG tablet Take 1 tablet (50 mg total) by mouth every evening.    warfarin (COUMADIN) 5 MG tablet TAKE 1/2 TABLET (2.5MG) SATURDAY, THEN TAKE 1 TABLET (5MG) ALL OTHER DAYS OR AS INSTRUCTED BY COUMADIN CLINIC     Family History       Problem Relation (Age of Onset)    Alcohol abuse Father    Diabetes Brother    Heart disease Mother, Father, Brother, Sister    Heart failure Mother, Father, Brother    No Known Problems Sister, Maternal Grandmother, Maternal Grandfather, Paternal Grandmother, Paternal Grandfather, Maternal Aunt, Maternal Uncle, Paternal Aunt, Paternal Uncle          Tobacco Use    Smoking status: Former     Current packs/day: 0.00     Average packs/day: 1 pack/day for 20.0 years (20.0 ttl pk-yrs)     Types: Cigarettes     Start date: 1960     Quit date: 1980     Years since quittin.2     Passive exposure: Never    Smokeless tobacco: Never   Substance and Sexual Activity    Alcohol use: No    Drug use: No    Sexual activity: Not Currently     Partners: Female     Review of Systems   Constitutional:  Positive for activity change and fatigue. Negative for chills and fever.   HENT:  Negative for trouble swallowing.    Eyes:  Negative  for photophobia and visual disturbance.   Respiratory:  Positive for shortness of breath. Negative for chest tightness and wheezing.    Cardiovascular:  Positive for palpitations. Negative for chest pain and leg swelling.   Gastrointestinal:  Negative for abdominal pain, constipation, diarrhea, nausea and vomiting.   Genitourinary:  Negative for dysuria, frequency, hematuria and urgency.   Musculoskeletal:  Negative for arthralgias, back pain and gait problem.   Skin:  Negative for color change and rash.   Neurological:  Positive for dizziness and light-headedness. Negative for syncope, weakness, numbness and headaches.   Psychiatric/Behavioral:  Negative for agitation and confusion. The patient is not nervous/anxious.      Objective:     Vital Signs (Most Recent):  Temp: 97.8 °F (36.6 °C) (12/13/24 1501)  Pulse: 63 (12/13/24 1520)  Resp: 18 (12/13/24 1520)  BP: (!) 156/72 (ENT at bedside suctioning pt.) (12/13/24 1501)  SpO2: 100 % (12/13/24 1520) Vital Signs (24h Range):  Temp:  [97.4 °F (36.3 °C)-97.8 °F (36.6 °C)] 97.8 °F (36.6 °C)  Pulse:  [63-85] 63  Resp:  [16-20] 18  SpO2:  [100 %] 100 %  BP: (137-156)/(56-72) 156/72     Weight: 70.8 kg (156 lb)  Body mass index is 25.18 kg/m².     Physical Exam  Vitals and nursing note reviewed.   Constitutional:       General: He is not in acute distress.     Appearance: He is well-developed. He is ill-appearing.   HENT:      Head: Normocephalic and atraumatic.      Nose:      Right Nostril: No epistaxis.      Left Nostril: Epistaxis present.      Comments: L nasal packing in place with active bleeding around the packing, afrin sprayed, pressure applied until resolved  ENT notified on my exam     Mouth/Throat:      Mouth: Mucous membranes are dry.   Eyes:      Extraocular Movements: Extraocular movements intact.      Conjunctiva/sclera: Conjunctivae normal.   Cardiovascular:      Rate and Rhythm: Normal rate and regular rhythm.      Heart sounds: Normal heart sounds.    Pulmonary:      Effort: Pulmonary effort is normal. No respiratory distress.      Breath sounds: Normal breath sounds. No wheezing.   Abdominal:      General: Bowel sounds are normal. There is no distension.      Palpations: Abdomen is soft.      Tenderness: There is no abdominal tenderness.   Musculoskeletal:         General: No tenderness. Normal range of motion.      Cervical back: Normal range of motion and neck supple.   Skin:     General: Skin is warm and dry.      Capillary Refill: Capillary refill takes less than 2 seconds.      Coloration: Skin is pale.      Findings: No rash.   Neurological:      Mental Status: He is alert and oriented to person, place, and time.      Cranial Nerves: No cranial nerve deficit.      Sensory: No sensory deficit.      Coordination: Coordination normal.   Psychiatric:         Behavior: Behavior normal.         Thought Content: Thought content normal.         Judgment: Judgment normal.                Significant Labs: All pertinent labs within the past 24 hours have been reviewed.    Significant Imaging: I have reviewed all pertinent imaging results/findings within the past 24 hours.  Assessment/Plan:     * Recurrent epistaxis  - 84 y.o. M on chronic anticoagulation with warfarin and extensive history of recurrent epistaxis s/p bilateral SP ligation followed by bilateral embolization, cautery on the left septum x2 for persistent bleeding (11/2023 & 12/2023), left AEA ligation (2/2024) and repeat embolization in 08/2024 presenting with recurrent epistaxis   - Hgb 6.0 from baseline ~8.0   - Nasal clamp placed in the ED  - ENT consulted, appreciate recs:   - Currently packed with bilateral merocels. Hgb 6. Getting transfused with pRBC and will be admitted to medicine for  close monitoring.  - Maintain bilateral merocel  - Please place patient on anti-staph antibiotics while packing is in place  - Afrin to bilateral nares q8h for the next 48h  - May place mustache dressing under  nose for trickling, ok if dressing becomes saturated with some red/brown drainage  - Nasal saline q4h while packing in place, even on side with nasal packing. Can continue nasal saline sprays to keep mucosa moist to prevent future bleeding.  - Keep head of bed elevated  - Apply Afrin to affected nasal cavity if epistaxis recurs, please see detailed instructions below   - Please call ENT with questions  - Remainder of care per primary team    Coronary artery disease involving native coronary artery of native heart without angina pectoris  Patient with known CAD s/p stent placement and CABG, which is controlled Will continue Statin and monitor for S/Sx of angina/ACS. Continue to monitor on telemetry.     JIM (acute kidney injury)  CKD IV  JIM is likely due to pre-renal azotemia due to intravascular volume depletion. Baseline creatinine is  2.2-2.4 . Most recent creatinine and eGFR are listed below.  Recent Labs     12/13/24  1252   CREATININE 2.6*   EGFRNORACEVR 23.7*   Plan  - JIM is stable  - Avoid nephrotoxins and renally dose meds for GFR listed above  - Monitor urine output, serial BMP, and adjust therapy as needed  - s/p IVFs in the ED    Acute blood loss anemia  Anemia is likely due to acute blood loss which was from epistaxis and Iron deficiency. Most recent hemoglobin and hematocrit are listed below.  Recent Labs     12/13/24  1252   HGB 6.0*   HCT 19.5*   Plan  - Monitor serial CBC: Every 12 hours  - Transfuse PRBC if patient becomes hemodynamically unstable, symptomatic or H/H drops below 7/21.  - Patient has received 1 unit of PRBCs in the ED on 12/13  - Patient's anemia is currently stable  - See epistaxis     Acidosis  - CO2 20 on BMP  - Likely 2/2 active bleeding  - Continue to monitor on daily labs     H/O mechanical aortic valve replacement  Chronic anticoagulation  - PT/INR 24/2.4 on admission  - Hold home warfarin for now given active bleeding     Type 2 diabetes mellitus with stage 4 chronic kidney  "disease, without long-term current use of insulin  - Patient's FSGs are controlled on current hypoglycemics.   - Last A1c reviewed-   Lab Results   Component Value Date    HGBA1C 5.3 05/30/2024     - Most recent fingerstick glucose reviewed- No results for input(s): "POCTGLUCOSE" in the last 24 hours.  - Current correctional scale Low  Maintain anti-hyperglycemic dose as follows-   Antihyperglycemics (From admission, onward)      Start     Stop Route Frequency Ordered    12/13/24 1616  insulin aspart U-100 pen 0-5 Units         -- SubQ Before meals & nightly PRN 12/13/24 1517        - Diabetic diet when not NPO    Renovascular hypertension  Hypertension associated with diabetes  - Latest BP and vitals reviewed  - Continue home meds for HTN:   Hypertension Medications               bumetanide (BUMEX) 1 MG tablet Take 2 tablets (2 mg total) by mouth every other day.    isosorbide mononitrate (IMDUR) 60 MG 24 hr tablet Take 1 tablet (60 mg total) by mouth every evening.   - Goal SBP 120s-140s. Utilize p.r.n. antihypertensives only if patient's BP >180/110 & develop symptoms of worsening CP or SOB.    Hyperlipidemia associated with type 2 diabetes mellitus  - Continue statin       VTE Risk Mitigation (From admission, onward)           Ordered     IP VTE HIGH RISK PATIENT  Once         12/13/24 1517     Place sequential compression device  Until discontinued         12/13/24 1517                         On 12/13/2024, patient should be placed in hospital observation services under my care in collaboration with Dr. Cody Carrington.           BELTRAN HernandezC  Department of Hospital Medicine  [facility] - Emergency Dept          "

## 2024-12-13 NOTE — ASSESSMENT & PLAN NOTE
"- Patient's FSGs are controlled on current hypoglycemics.   - Last A1c reviewed-   Lab Results   Component Value Date    HGBA1C 5.3 05/30/2024     - Most recent fingerstick glucose reviewed- No results for input(s): "POCTGLUCOSE" in the last 24 hours.  - Current correctional scale Low  Maintain anti-hyperglycemic dose as follows-   Antihyperglycemics (From admission, onward)      Start     Stop Route Frequency Ordered    12/13/24 1616  insulin aspart U-100 pen 0-5 Units         -- SubQ Before meals & nightly PRN 12/13/24 1517        - Diabetic diet when not NPO  "

## 2024-12-13 NOTE — ASSESSMENT & PLAN NOTE
Anemia is likely due to acute blood loss which was from epistaxis and Iron deficiency. Most recent hemoglobin and hematocrit are listed below.  Recent Labs     12/13/24  1252   HGB 6.0*   HCT 19.5*   Plan  - Monitor serial CBC: Every 12 hours  - Transfuse PRBC if patient becomes hemodynamically unstable, symptomatic or H/H drops below 7/21.  - Patient has received 1 unit of PRBCs in the ED on 12/13  - Patient's anemia is currently stable  - See epistaxis

## 2024-12-13 NOTE — HPI
Dariel Urbina is a 84 y.o. M with HTN, HLD, combined CHF, paroxysmal A fib, CAD s/p CABG and stent placement,  h/o mechanical aortic valve replacement on warfarin, T2DM, stage IV CKD, gout, and recurrent epistaxis s/p bilateral SP ligation followed by bilateral embolization, cautery on the left septum x2 for persistent bleeding (11/2023 & 12/2023), left AEA ligation (2/2024) and repeat embolization in 08/2024 presenting with recurrent epistaxis. The patient reports his most recent episode of epistaxis started ~3-4 days ago and has been intermittent with persistent worsening and did not stop with persistent pressure and afrin. Pt endorses associated dizziness, lightheadedness, palpitations, and SOB but otherwise denies fever, chills, chest pain, abdominal pain, N/V/D, dysuria, leg swelling or pain, HA, or syncope.       In the ED: Hypertensive to 170s/70s, o/w VSSAF. CBC with Hgb 6.0 (from baseline ~8), no leukocytosis. PT 24/INR 2.4. CMP with Cr 2.6 (from baseline 2.2-2.4). ED provider attempted nasal packing without significant improvement. The patient was given 1u pRBCs, afrin, and NS bolus. ENT consulted & patient was placed in observation for further management.

## 2024-12-13 NOTE — ASSESSMENT & PLAN NOTE
"-recurrent left nare bleeding with supratherapeutic INR causing acute blood loss anemia.    -s/p Rhinorocket placement in ED  - w/ afrin spray and tranexamic acid intranasally   -Consult ENT in AM     -per last ENT inpatient consult recs   Avoid use of nasal cannula if any hypoxia develops - use only humidified o2 via face mask/face tent  For recurrent bleeding - given presence of rhino rocket - would contact ENT on-call  Prior ENT notes have mentioned possible need for angiography and embolization with IR.       Prior history of ENT procedures "bilateral SP ligation followed by bilateral embolization, cautery on the left septum for persistent bleeding on 11/03/2023 and again recently on 12/18/23. He recently underwent left AEA ligation on 2/28/24. Most recently seen in clinic 5/21 and underwent L sided cautery."  " Type 2 diabetes mellitus, with long-term current use of insulin Chronic diastolic congestive heart failure

## 2024-12-13 NOTE — ASSESSMENT & PLAN NOTE
CKD IV  JIM is likely due to pre-renal azotemia due to intravascular volume depletion. Baseline creatinine is  2.2-2.4 . Most recent creatinine and eGFR are listed below.  Recent Labs     12/13/24  1252   CREATININE 2.6*   EGFRNORACEVR 23.7*   Plan  - JIM is stable  - Avoid nephrotoxins and renally dose meds for GFR listed above  - Monitor urine output, serial BMP, and adjust therapy as needed  - s/p IVFs in the ED

## 2024-12-13 NOTE — HPI
Dariel is a 84 y.o. with history of recurrent epistaxis, on Coumadin for heart valve, who is s/p bilateral SP ligation followed by bilateral embolization, cautery on the left septum for persistent bleeding on 11/03/2023 and 12/18/23. He recently underwent left AEA ligation on 2/28/24 and underwent nasal endoscopy and control of epistaxis on 6/18/24.  He then had repeat embolization on 08/06/24.     He reports intermitted nose bleeds for the last 3 days which worsened this morning and did not stop with pressure and Afrin. ED attempted nasal packing without improvement. ENT consulted for management of epistaxis.

## 2024-12-13 NOTE — ASSESSMENT & PLAN NOTE
- 84 y.o. M on chronic anticoagulation with warfarin and extensive history of recurrent epistaxis s/p bilateral SP ligation followed by bilateral embolization, cautery on the left septum x2 for persistent bleeding (11/2023 & 12/2023), left AEA ligation (2/2024) and repeat embolization in 08/2024 presenting with recurrent epistaxis   - Hgb 6.0 from baseline ~8.0   - Nasal clamp placed in the ED  - ENT consulted, appreciate recs:   - Currently packed with bilateral merocels. Hgb 6. Getting transfused with pRBC and will be admitted to medicine for  close monitoring.  - Maintain bilateral merocel  - Please place patient on anti-staph antibiotics while packing is in place  - Afrin to bilateral nares q8h for the next 48h  - May place mustache dressing under nose for trickling, ok if dressing becomes saturated with some red/brown drainage  - Nasal saline q4h while packing in place, even on side with nasal packing. Can continue nasal saline sprays to keep mucosa moist to prevent future bleeding.  - Keep head of bed elevated  - Apply Afrin to affected nasal cavity if epistaxis recurs, please see detailed instructions below   - Please call ENT with questions  - Remainder of care per primary team

## 2024-12-13 NOTE — ASSESSMENT & PLAN NOTE
Patient with known CAD s/p stent placement and CABG, which is controlled Will continue Statin and monitor for S/Sx of angina/ACS. Continue to monitor on telemetry.

## 2024-12-13 NOTE — ED PROVIDER NOTES
Show Encounter Date: 12/13/2024       History     Chief Complaint   Patient presents with    Epistaxis     Bleeding for 3days on coumadin, hgb was 7     84-year-old male significant medical history of mechanical valve on Coumadin, recurrent epistaxis requiring embolization presents to the emergency department due to 3 days of intermittent episodes of epistaxis.  He endorses large clots evacuated from the nose at home.  He also states he can feel the blood dripping down his throat causing him to spit it up.  He has used Afrin at home with little improvement.  He states if he keeps still the bleeding will resolve however soon as he moves it returns.  His wife is at bedside who reports on his most recent labwork his hemoglobin was low.  Patient is not endorsing weakness or shortness, chest pain or shortness of breath at this time.      Review of patient's allergies indicates:   Allergen Reactions    Lisinopril     Losartan      Intolerance- elevates potassium level     Past Medical History:   Diagnosis Date    Anemia of chronic renal failure, stage 3 (moderate) 05/27/2015    Anticoagulant long-term use     Atherosclerosis of coronary artery bypass graft of native heart without angina pectoris 09/11/2012    3-27-18 [facility] Two vessel coronary artery disease.   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    Bilateral carotid artery disease 02/09/2017    Bleeding from the nose     Bleeding nose 03/21/2018    Cancer     Cataract     CHF (congestive heart failure)     CKD (chronic kidney disease) stage 3, GFR 30-59 ml/min 05/27/2015    Claudication of left lower extremity 09/17/2014    Colon polyp     Encounter for blood transfusion     Gastroesophageal reflux disease without esophagitis 03/19/2018    Gastrointestinal hemorrhage associated with intestinal diverticulosis 04/01/2018    Glaucoma     H/O mechanical aortic valve replacement 09/17/2014    History of gout 09/26/2012    Hyperparathyroidism due to  renal insufficiency 07/27/2015    Internal hemorrhoid 04/03/2018    Long term current use of anticoagulant therapy 09/26/2012    Mechanical heart valve present     Metabolic acidosis with normal anion gap and bicarbonate losses 03/20/2018    Mixed hyperlipidemia 09/26/2012    NSTEMI (non-ST elevated myocardial infarction) 03/21/2018    Obesity, diabetes, and hypertension syndrome 02/23/2016    Paroxysmal atrial fibrillation 02/06/2023    PVD (peripheral vascular disease) 09/11/2012    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     Past Surgical History:   Procedure Laterality Date    BACK SURGERY      CARDIAC CATHETERIZATION      CARDIAC VALVE REPLACEMENT      CARDIAC VALVE SURGERY      CARPAL TUNNEL RELEASE Right 05/19/2020    Procedure: RELEASE, CARPAL TUNNEL;  Surgeon: Rupesh Norris Jr., MD;  Location: [facility];  Service: Plastics;  Laterality: Right;    CATARACT EXTRACTION Left 11/13/2022        COLON SURGERY      COLONOSCOPY N/A 03/31/2017    Procedure: COLONOSCOPY;  Surgeon: Bruno Raymond MD;  Location: [facility] (4TH FLR);  Service: Endoscopy;  Laterality: N/A;  Patient's wife requesting date.    COLONOSCOPY N/A 04/03/2018    Procedure: COLONOSCOPY;  Surgeon: Bonifacio Pelletier MD;  Location: [facility] ENDO (2ND FLR);  Service: Endoscopy;  Laterality: N/A;    COLONOSCOPY N/A 08/13/2018    Procedure: COLONOSCOPY;  Surgeon: Kam Barba MD;  Location: [facility] ENDO (2ND FLR);  Service: Endoscopy;  Laterality: N/A;  2nd floor: PA pressure 49; hx of moderate-severe valve disease     per Coumadin clinic-Patient can hold 5 days with lovenox bridge       ok to schedule per Katarina     CONTROL OF EPISTAXIS, POSTERIOR, USING NASAL PACKING OR CAUTERIZATION Bilateral 6/18/2024    Procedure: CONTROL OF EPISTAXIS, POSTERIOR, USING NASAL PACKING OR CAUTERIZATION;  Surgeon: Jono Russell MD;  [facility];  Service: ENT;  Laterality: Bilateral;    CONTROL OF EPISTAXIS, POSTERIOR, USING NASAL PACKING OR CAUTERIZATION Bilateral 8/8/2024    Procedure: CONTROL OF EPISTAXIS, POSTERIOR, USING NASAL PACKING OR CAUTERIZATION;  Surgeon: Jono Russell MD;  [facility];  Service: ENT;  Laterality: Bilateral;  suction bovie, nasopore, endoscopes    CORONARY ANGIOGRAPHY N/A 10/04/2021    Procedure: Left heart cath +/- peripheral angiogram;  Surgeon: Jose Ruiz MD;  Location: [facility] CATH LAB;  Service: Cardiology;  Laterality: N/A;    CORONARY ANGIOGRAPHY N/A 3/2/2023    Procedure: Angiogram, Coronary;  Surgeon: Devon Garnica MD;  Location: [facility] CATH LAB;  Service: Cardiology;  Laterality: N/A;    CORONARY ANGIOPLASTY      CORONARY ARTERY BYPASS GRAFT      CORONARY BYPASS GRAFT ANGIOGRAPHY  10/04/2021    Procedure: Bypass graft study;  Surgeon: Jose Ruiz MD;  Location: [facility] CATH LAB;  Service: Cardiology;;    CORONARY BYPASS GRAFT ANGIOGRAPHY  3/2/2023    Procedure: Bypass graft study;  Surgeon: Devon Garnica MD;  Location: [facility] CATH LAB;  Service: Cardiology;;    ECHOCARDIOGRAM,TRANSESOPHAGEAL N/A 4/14/2023    Procedure: Transesophageal echo (KIRSTEN) intra-procedure log documentation;  Surgeon: [facility] Diagnostic Provider;  Location: [facility] EP LAB;  Service: Cardiology;  Laterality: N/A;    ENDOSCOPIC NASAL CAUTERIZATION Bilateral 11/3/2023    Procedure: CAUTERIZATION, NOSE, ENDOSCOPIC;  Surgeon: Jono Russell MD;  [facility];  Service: ENT;  Laterality: Bilateral;    ENDOSCOPIC NASAL CAUTERIZATION N/A 12/18/2023    Procedure: CAUTERIZATION, NOSE, ENDOSCOPIC;  Surgeon: Jono Russell MD;  [facility];  Service: ENT;  Laterality: N/A;    EYE  SURGERY      FESS, WITH IMAGING GUIDANCE Left 2/28/2024    Procedure: FESS, WITH IMAGING GUIDANCE;  Surgeon: Jono Russell MD;  Location: [facility] OR 2ND FLR;  Service: ENT;  Laterality: Left;  Scan loaded ENT [employer]. CL    FLUOROSCOPY Bilateral 10/29/2024    Procedure: Fluoroscopy;  Surgeon: Sameer Espitia MD;  Location: [facility] CATH LAB/EP;  Service: Cardiology;  Laterality: Bilateral;  fluoroscopy of the mechanical aortic valve    FUNCTIONAL ENDOSCOPIC SINUS SURGERY (FESS) Bilateral 6/14/2023    Procedure: FESS (FUNCTIONAL ENDOSCOPIC SINUS SURGERY);  Surgeon: Jono Russell MD;  Location: [facility] OR 2ND FLR;  Service: ENT;  Laterality: Bilateral;    HERNIA REPAIR      INTRAOCULAR PROSTHESES INSERTION Left 11/13/2022    Procedure: INSERTION, IOL PROSTHESIS;  Surgeon: Alia Mckeon MD;  Location: [facility] OR 1ST FLR;  Service: Ophthalmology;  Laterality: Left;    INTRAOCULAR PROSTHESES INSERTION Right 12/04/2022    Procedure: INSERTION, IOL PROSTHESIS;  Surgeon: Alia Mckeon MD;  [facility] 1ST FLR;  Service: Ophthalmology;  Laterality: Right;    LIGATION, ARTERY, ETHMOIDAL Left 2/28/2024    Procedure: LIGATION, ARTERY, ETHMOIDAL;  Surgeon: Jono Russell MD;  Location: [facility] OR 2ND FLR;  Service: ENT;  Laterality: Left;    LIGATION, ARTERY, SPHENOPALATINE, ENDOSCOPIC Bilateral 6/14/2023    Procedure: LIGATION, ARTERY, SPHENOPALATINE, ENDOSCOPIC;  Surgeon: Jono Russell MD;  [facility] 2ND FLR;  Service: ENT;  Laterality: Bilateral;    NASAL ENDOSCOPY N/A 8/8/2024    Procedure: ENDOSCOPY, NOSE;  Surgeon: Jono Russell MD;  [facility] 2ND FLR;  Service: ENT;  Laterality: N/A;    PHACOEMULSIFICATION OF CATARACT Left 11/13/2022    Procedure: PHACOEMULSIFICATION, CATARACT;  Surgeon: Alia Mckeon MD;  [facility] 1ST FLR;  Service: Ophthalmology;  Laterality: Left;    PHACOEMULSIFICATION OF CATARACT Right 12/04/2022    Procedure: PHACOEMULSIFICATION, CATARACT;  Surgeon:  Alia Mckeon MD;  [address]  Service: Ophthalmology;  Laterality: Right;    SPINE SURGERY      TRANSESOPHAGEAL ECHOCARDIOGRAPHY N/A 3/22/2023    Procedure: ECHOCARDIOGRAM, TRANSESOPHAGEAL;  Surgeon: [facility] Diagnostic Provider;  Location: [facility] EP LAB;  Service: Anesthesiology;  Laterality: N/A;    TRANSESOPHAGEAL ECHOCARDIOGRAPHY N/A 2023    Procedure: ECHOCARDIOGRAM, TRANSESOPHAGEAL;  Surgeon: [facility] Diagnostic Provider;  Location: [facility] EP LAB;  Service: Anesthesiology;  Laterality: N/A;    VASECTOMY       Family History   Problem Relation Name Age of Onset    Heart failure Mother      Heart disease Mother      Heart failure Father      Heart disease Father      Alcohol abuse Father      Heart failure Brother      Heart disease Brother      Diabetes Brother      No Known Problems Sister      No Known Problems Maternal Grandmother      No Known Problems Maternal Grandfather      No Known Problems Paternal Grandmother      No Known Problems Paternal Grandfather      Heart disease Sister      No Known Problems Maternal Aunt      No Known Problems Maternal Uncle      No Known Problems Paternal Aunt      No Known Problems Paternal Uncle      Amblyopia Neg Hx      Blindness Neg Hx      Cancer Neg Hx      Cataracts Neg Hx      Glaucoma Neg Hx      Hypertension Neg Hx      Macular degeneration Neg Hx      Retinal detachment Neg Hx      Strabismus Neg Hx      Stroke Neg Hx      Thyroid disease Neg Hx      Anemia Neg Hx      Arrhythmia Neg Hx      Asthma Neg Hx      Clotting disorder Neg Hx      Fainting Neg Hx      Heart attack Neg Hx      Hyperlipidemia Neg Hx      Atrial Septal Defect Neg Hx      Melanoma Neg Hx       Social History     Tobacco Use    Smoking status: Former     Current packs/day: 0.00     Average packs/day: 1 pack/day for 20.0 years (20.0 ttl pk-yrs)     Types: Cigarettes     Start date: 1960     Quit date: 1980     Years since quittin.2     Passive exposure: Never     Smokeless tobacco: Never   Substance Use Topics    Alcohol use: No    Drug use: No     Review of Systems  See HPI  Physical Exam     Initial Vitals   BP Pulse Resp Temp SpO2   12/13/24 1212 12/13/24 1212 12/13/24 1211 12/13/24 1211 12/13/24 1303   (!) 137/56 67 20 97.4 °F (36.3 °C) 100 %      MAP       --                Physical Exam    Vitals reviewed.  Constitutional: He appears well-developed.   HENT:   Head: Normocephalic and atraumatic.   Chronic perforated nasal septum.  Bright red blood seen in nares bilaterally.  Erythema and bleeding appears to be originating along medial posterior wall of left nare.  Bleeding can also be visualized in the posterior oropharynx.  No large clots feel visualized.   Eyes: Conjunctivae and EOM are normal.   Neck:   Normal range of motion.  Cardiovascular:  Normal rate.           Pulmonary/Chest: No respiratory distress.   Abdominal: Abdomen is soft. He exhibits no distension. There is no abdominal tenderness. There is no rebound.   Musculoskeletal:         General: Normal range of motion.      Cervical back: Normal range of motion.     Neurological: He is alert and oriented to person, place, and time.   Skin: Skin is warm and dry.   Psychiatric: He has a normal mood and affect. Thought content normal.         ED Course   Procedures  Labs Reviewed   CBC W/ AUTO DIFFERENTIAL - Abnormal       Result Value    WBC 5.40      RBC 1.96 (*)     Hemoglobin 6.0 (*)     Hematocrit 19.5 (*)      (*)     MCH 30.6      MCHC 30.8 (*)     RDW 14.6 (*)     Platelets 195      MPV 10.6      Immature Granulocytes 0.7 (*)     Gran # (ANC) 3.9      Immature Grans (Abs) 0.04      Lymph # 0.9 (*)     Mono # 0.5      Eos # 0.1      Baso # 0.03      nRBC 0      Gran % 72.1      Lymph % 15.9 (*)     Mono % 8.7      Eosinophil % 2.0      Basophil % 0.6      Differential Method Automated      Narrative:      HCT  critical result(s) called and verbal readback obtained from   Olvin Goss RN  by DARIN  12/13/2024 14:02   COMPREHENSIVE METABOLIC PANEL - Abnormal    Sodium 139      Potassium 4.5      Chloride 107      CO2 20 (*)     Glucose 118 (*)     BUN 58 (*)     Creatinine 2.6 (*)     Calcium 9.6      Total Protein 6.6      Albumin 3.1 (*)     Total Bilirubin 0.4      Alkaline Phosphatase 64      AST 15      ALT 11      eGFR 23.7 (*)     Anion Gap 12     PROTIME-INR - Abnormal    Prothrombin Time 24.6 (*)     INR 2.4 (*)    POCT GLUCOSE - Abnormal    POCT Glucose 114 (*)    HEMOGLOBIN A1C   HEMOGLOBIN A1C    Hemoglobin A1C 5.1      Estimated Avg Glucose 100      Narrative:     ADD ON HEMOGLOBIN A1C [id number] PER MIKHAIL LUCAS PA-C 15:34    12/13/2024    CBC W/ AUTO DIFFERENTIAL   POCT GLUCOSE, HAND-HELD DEVICE   TYPE & SCREEN    Group & Rh O NEG      Indirect Justyna NEG      Specimen Outdate 12/16/2024 23:59     PREPARE RBC SOFT    UNIT NUMBER [id number]      Product Code N9223O40      DISPENSE STATUS ISSUED      CODING SYSTEM FLXF323      Unit Blood Type Code 9500      Unit Blood Type O NEG      Unit Expiration [id number]      CROSSMATCH INTERPRETATION Compatible            Imaging Results    None          Medications   0.9%  NaCl infusion (for blood administration) (has no administration in time range)   allopurinoL tablet 100 mg (has no administration in time range)   bumetanide tablet 2 mg (has no administration in time range)   ferrous gluconate tablet 324 mg (has no administration in time range)   isosorbide mononitrate 24 hr tablet 60 mg (has no administration in time range)   atorvastatin tablet 80 mg (80 mg Oral Given 12/13/24 7957)   traZODone tablet 50 mg (has no administration in time range)   sodium chloride 0.9% flush 10 mL (has no administration in time range)   ondansetron disintegrating tablet 8 mg (has no administration in time range)   prochlorperazine injection Soln 5 mg (has no administration in time range)   polyethylene glycol packet 17 g (has no administration in time range)    bisacodyL suppository 10 mg (has no administration in time range)   simethicone chewable tablet 80 mg (has no administration in time range)   aluminum-magnesium hydroxide-simethicone 200-200-20 mg/5 mL suspension 30 mL (has no administration in time range)   acetaminophen tablet 1,000 mg (has no administration in time range)   naloxone 0.4 mg/mL injection 0.02 mg (has no administration in time range)   glucose chewable tablet 16 g (has no administration in time range)   glucose chewable tablet 24 g (has no administration in time range)   glucagon (human recombinant) injection 1 mg (has no administration in time range)   insulin aspart U-100 pen 0-5 Units (has no administration in time range)   dextrose 10% bolus 125 mL 125 mL (has no administration in time range)   dextrose 10% bolus 250 mL 250 mL (has no administration in time range)   tranexamic acid nebulizer Soln 500 mg (has no administration in time range)   mupirocin 2 % ointment (has no administration in time range)   doxycycline tablet 100 mg (has no administration in time range)   oxymetazoline 0.05 % nasal spray 2 spray (2 sprays Each Nostril Given 12/13/24 1614)   sodium chloride 0.65 % nasal spray 1 spray (has no administration in time range)   oxymetazoline 0.05 % nasal spray 2 spray (2 sprays Each Nostril Given 12/13/24 1230)   LIDOcaine-EPINEPHrine 1%-1:100,000 injection 10 mL (10 mLs Other Given 12/13/24 1330)   sodium chloride 0.9% bolus 1,000 mL 1,000 mL (0 mLs Intravenous Stopped 12/13/24 1544)     Medical Decision Making  84-year-old male anticoagulated presenting with recurrent epistaxis.  Ordered labwork due to concerns of anemia, reviewed INR which is in therapeutic range. Patient is presenting with intermittent episodes of active bleeding.  Creatinine mildly elevated from baseline given IV fluids.    Reviewed chart:  He previously undergone bilateral SP ligation followed by bilateral embolization, cautery on the left septum for persistent  bleeding on 11/03/2023 and again recently on 12/18/23. He recently underwent left AEA ligation on 2/28/24 and recently underwent nasal endoscopy and control of epistaxis on 6/18/24.  He then had repeat embolization on 08/06/24.     Methods used for epistaxis in ED includes Afrin, Merocel with lidocaine/ epi however no significant improvement, but source appears to be from the left nare as there is erythema along the medial aspect of the septum.  Consulted ENT.    Labwork also remarkable for hemoglobin of 6, I consented patient for blood.  Patient admitted to hospital medicine for anemia requiring transfusion and continue  ENT evaluation for recurrent epistaxis.    Patient discussed with supervising physician  Hospital medicine agreeable to plan for admission.    Amount and/or Complexity of Data Reviewed  Independent Historian: spouse  Labs: ordered. Decision-making details documented in ED Course.    Risk  OTC drugs.  Prescription drug management.               ED Course as of 12/13/24 1720   Fri Dec 13, 2024   1326 INR(!): 2.4 [CR]      ED Course User Index  [CR] Vilma Dowd PA-C                           Clinical Impression:  Final diagnoses:  [R04.0] Left-sided epistaxis (Primary)  [R04.0] Recurrent epistaxis  [D64.9] Anemia, unspecified type  [R07.9] Chest pain          ED Disposition Condition    Observation Stable                   Vilma Dowd PA-C  12/13/24 1603       Vilma Dowd PA-C  12/13/24 1720

## 2024-12-13 NOTE — ED NOTES
"Patient comes into the emergency department by POV with complaints of epistaxis. Patient states that his nose has been bleeding continuously for 3 days, on coumadin at home, this morning spitting up clots, dizziness. After spitting up "large clot" pt reports CP to left side that has since resolved. Pt states it was bleeding for a few days but he was able to control bleeding at home. Pt has hx of similar symptoms, needing blood transfusions. Patient denies SOB, CP, N/V/D, HA, vision changes.    LOC: The patient is awake, alert and aware of environment with an appropriate affect, the patient is oriented x 3 and speaking appropriately.   APPEARANCE: Patient appears comfortable and in no acute distress, patient is clean and well groomed.  SKIN: The skin is warm and dry, color consistent with ethnicity, patient has normal skin turgor and moist mucus membranes, skin intact, no breakdown or bruising noted. Pt actively bleeding, using gauze/tissues to control bleeding.  MUSCULOSKELETAL: Patient moving all extremities spontaneously, no swelling noted. Reports dizziness.  RESPIRATORY: Airway is open and patent, respirations are spontaneous, patient has a normal effort and rate, no accessory muscle. Denies SOB, currently on RA, no labored breathing noted.  CARDIAC: Pt placed on cardiac monitor. Patient has a normal rate and regular rhythm, no edema noted, capillary refill < 3 seconds. Denies CP.  GASTRO: Soft and non tender to palpation, no distention noted.  : Pt denies any pain or frequency with urination.  NEURO: Pt opens eyes spontaneously, behavior appropriate to situation, follows commands, facial expression symmetrical, bilateral hand grasp equal and even, purposeful motor response noted, normal sensation in all extremities when touched with a finger.        "

## 2024-12-13 NOTE — SUBJECTIVE & OBJECTIVE
Medications:  Continuous Infusions:  Scheduled Meds:   [START ON 12/14/2024] allopurinoL  100 mg Oral Daily    atorvastatin  80 mg Oral Daily    [START ON 12/14/2024] bumetanide  2 mg Oral Every other day    [START ON 12/14/2024] ferrous gluconate  324 mg Oral Daily with breakfast    isosorbide mononitrate  60 mg Oral QHS     PRN Meds:  Current Facility-Administered Medications:     0.9%  NaCl infusion (for blood administration), , Intravenous, Q24H PRN    acetaminophen, 1,000 mg, Oral, Q6H PRN    aluminum-magnesium hydroxide-simethicone, 30 mL, Oral, QID PRN    bisacodyL, 10 mg, Rectal, Daily PRN    dextrose 10%, 12.5 g, Intravenous, PRN    dextrose 10%, 25 g, Intravenous, PRN    glucagon (human recombinant), 1 mg, Intramuscular, PRN    glucose, 16 g, Oral, PRN    glucose, 24 g, Oral, PRN    insulin aspart U-100, 0-5 Units, Subcutaneous, QID (AC + HS) PRN    naloxone, 0.02 mg, Intravenous, PRN    ondansetron, 8 mg, Oral, Q8H PRN    polyethylene glycol, 17 g, Oral, BID PRN    prochlorperazine, 5 mg, Intravenous, Q6H PRN    simethicone, 1 tablet, Oral, QID PRN    sodium chloride 0.9%, 10 mL, Intravenous, Q12H PRN    traZODone, 50 mg, Oral, Nightly PRN     No current facility-administered medications on file prior to encounter.     Current Outpatient Medications on File Prior to Encounter   Medication Sig    acetaminophen (TYLENOL) 500 MG tablet Take 500 mg by mouth daily as needed for Pain.    albuterol (VENTOLIN HFA) 90 mcg/actuation inhaler Inhale 2 puffs into the lungs every 6 (six) hours as needed for Wheezing. Rescue    allopurinoL (ZYLOPRIM) 100 MG tablet Take 1 tablet (100 mg total) by mouth once daily.    bumetanide (BUMEX) 1 MG tablet Take 2 tablets (2 mg total) by mouth every other day.    ferrous gluconate (FERGON) 324 MG tablet TAKE 1 TABLET EVERY DAY WITH BREAKFAST    isosorbide mononitrate (IMDUR) 60 MG 24 hr tablet Take 1 tablet (60 mg total) by mouth every evening.    omega-3 fatty acids/fish oil  (FISH OIL-OMEGA-3 FATTY ACIDS) 300-1,000 mg capsule Take 1 capsule by mouth once daily.    oxymetazoline (AFRIN) 0.05 % nasal spray 2 sprays by Nasal route as needed for Congestion (nose bleeds).    rosuvastatin (CRESTOR) 40 MG Tab TAKE 1 TABLET EVERY EVENING.    sodium chloride (SALINE NASAL) 0.65 % nasal spray Use 2 sprays by Nasal route every 4 hours. Apply into nasal cavities daily with nightly application of vaseline/aquaphor to anterior nares. Keep head of bed elevated.    traZODone (DESYREL) 50 MG tablet Take 1 tablet (50 mg total) by mouth every evening.    warfarin (COUMADIN) 5 MG tablet TAKE 1/2 TABLET (2.5MG) SATURDAY, THEN TAKE 1 TABLET (5MG) ALL OTHER DAYS OR AS INSTRUCTED BY COUMADIN CLINIC       Review of patient's allergies indicates:   Allergen Reactions    Lisinopril     Losartan      Intolerance- elevates potassium level       Past Medical History:   Diagnosis Date    Anemia of chronic renal failure, stage 3 (moderate) 05/27/2015    Anticoagulant long-term use     Atherosclerosis of coronary artery bypass graft of native heart without angina pectoris 09/11/2012    3-27-18 [facility] Two vessel coronary artery disease.   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    Bilateral carotid artery disease 02/09/2017    Bleeding from the nose     Bleeding nose 03/21/2018    Cancer     Cataract     CHF (congestive heart failure)     CKD (chronic kidney disease) stage 3, GFR 30-59 ml/min 05/27/2015    Claudication of left lower extremity 09/17/2014    Colon polyp     Encounter for blood transfusion     Gastroesophageal reflux disease without esophagitis 03/19/2018    Gastrointestinal hemorrhage associated with intestinal diverticulosis 04/01/2018    Glaucoma     H/O mechanical aortic valve replacement 09/17/2014    History of gout 09/26/2012    Hyperparathyroidism due to renal insufficiency 07/27/2015    Internal hemorrhoid 04/03/2018    Long term current use of anticoagulant therapy  09/26/2012    Mechanical heart valve present     Metabolic acidosis with normal anion gap and bicarbonate losses 03/20/2018    Mixed hyperlipidemia 09/26/2012    NSTEMI (non-ST elevated myocardial infarction) 03/21/2018    Obesity, diabetes, and hypertension syndrome 02/23/2016    Paroxysmal atrial fibrillation 02/06/2023    PVD (peripheral vascular disease) 09/11/2012    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     Past Surgical History:   Procedure Laterality Date    BACK SURGERY      CARDIAC CATHETERIZATION      CARDIAC VALVE REPLACEMENT      CARDIAC VALVE SURGERY      CARPAL TUNNEL RELEASE Right 05/19/2020    Procedure: RELEASE, CARPAL TUNNEL;  Surgeon: Rupesh Norris Jr., MD;  Location: [facility];  Service: Plastics;  Laterality: Right;    CATARACT EXTRACTION Left 11/13/2022        COLON SURGERY      COLONOSCOPY N/A 03/31/2017    Procedure: COLONOSCOPY;  Surgeon: Bruno Raymond MD;  Location: [facility] (4TH FLR);  Service: Endoscopy;  Laterality: N/A;  Patient's wife requesting date.    COLONOSCOPY N/A 04/03/2018    Procedure: COLONOSCOPY;  Surgeon: Bonifacio Pelletier MD;  Location: [facility] ENDO (2ND FLR);  Service: Endoscopy;  Laterality: N/A;    COLONOSCOPY N/A 08/13/2018    Procedure: COLONOSCOPY;  Surgeon: Kam Barba MD;  Location: [facility] ENDO (2ND FLR);  Service: Endoscopy;  Laterality: N/A;  2nd floor: PA pressure 49; hx of moderate-severe valve disease     per Coumadin clinic-Patient can hold 5 days with lovenox bridge       ok to schedule per Katarina    CONTROL OF EPISTAXIS, POSTERIOR, USING NASAL PACKING OR CAUTERIZATION Bilateral 6/18/2024    Procedure: CONTROL OF  EPISTAXIS, POSTERIOR, USING NASAL PACKING OR CAUTERIZATION;  Surgeon: oJno Russell MD;  Location: [facility] OR [facility] FLR;  Service: ENT;  Laterality: Bilateral;    CONTROL OF EPISTAXIS, POSTERIOR, USING NASAL PACKING OR CAUTERIZATION Bilateral 8/8/2024    Procedure: CONTROL OF EPISTAXIS, POSTERIOR, USING NASAL PACKING OR CAUTERIZATION;  Surgeon: Jono Russell MD;  [facility];  Service: ENT;  Laterality: Bilateral;  suction bovie, nasopore, endoscopes    CORONARY ANGIOGRAPHY N/A 10/04/2021    Procedure: Left heart cath +/- peripheral angiogram;  Surgeon: Jose Ruiz MD;  Location: [facility] CATH LAB;  Service: Cardiology;  Laterality: N/A;    CORONARY ANGIOGRAPHY N/A 3/2/2023    Procedure: Angiogram, Coronary;  Surgeon: Devon Garnica MD;  Location: [facility] CATH LAB;  Service: Cardiology;  Laterality: N/A;    CORONARY ANGIOPLASTY      CORONARY ARTERY BYPASS GRAFT      CORONARY BYPASS GRAFT ANGIOGRAPHY  10/04/2021    Procedure: Bypass graft study;  Surgeon: Jose Ruiz MD;  Location: [facility] CATH LAB;  Service: Cardiology;;    CORONARY BYPASS GRAFT ANGIOGRAPHY  3/2/2023    Procedure: Bypass graft study;  Surgeon: Devon Garnica MD;  Location: [facility] CATH LAB;  Service: Cardiology;;    ECHOCARDIOGRAM,TRANSESOPHAGEAL N/A 4/14/2023    Procedure: Transesophageal echo (KIRSTEN) intra-procedure log documentation;  Surgeon: [facility] Diagnostic Provider;  Location: [facility] EP LAB;  Service: Cardiology;  Laterality: N/A;    ENDOSCOPIC NASAL CAUTERIZATION Bilateral 11/3/2023    Procedure: CAUTERIZATION, NOSE, ENDOSCOPIC;  Surgeon: Jono Russell MD;  [facility];  Service: ENT;  Laterality: Bilateral;    ENDOSCOPIC NASAL CAUTERIZATION N/A 12/18/2023    Procedure: CAUTERIZATION, NOSE, ENDOSCOPIC;  Surgeon: Jono Russell MD;  [facility];  Service: ENT;  Laterality: N/A;    EYE SURGERY      FESS, WITH IMAGING GUIDANCE Left 2/28/2024    Procedure: FESS, WITH IMAGING GUIDANCE;  Surgeon:  Jono Russell MD;  Location: [facility] OR 2ND FLR;  Service: ENT;  Laterality: Left;  Scan loaded ENT [employer]. CL    FLUOROSCOPY Bilateral 10/29/2024    Procedure: Fluoroscopy;  Surgeon: Sameer Espitia MD;  Location: [facility] CATH LAB/EP;  Service: Cardiology;  Laterality: Bilateral;  fluoroscopy of the mechanical aortic valve    FUNCTIONAL ENDOSCOPIC SINUS SURGERY (FESS) Bilateral 6/14/2023    Procedure: FESS (FUNCTIONAL ENDOSCOPIC SINUS SURGERY);  Surgeon: Jono Russell MD;  Location: [facility] OR 2ND FLR;  Service: ENT;  Laterality: Bilateral;    HERNIA REPAIR      INTRAOCULAR PROSTHESES INSERTION Left 11/13/2022    Procedure: INSERTION, IOL PROSTHESIS;  Surgeon: Alia Mckeon MD;  Location: [facility] OR 1ST FLR;  Service: Ophthalmology;  Laterality: Left;    INTRAOCULAR PROSTHESES INSERTION Right 12/04/2022    Procedure: INSERTION, IOL PROSTHESIS;  Surgeon: Alia Mckeon MD;  Location: [facility] OR 1ST FLR;  Service: Ophthalmology;  Laterality: Right;    LIGATION, ARTERY, ETHMOIDAL Left 2/28/2024    Procedure: LIGATION, ARTERY, ETHMOIDAL;  Surgeon: Jono Russell MD;  Location: [facility] OR 2ND FLR;  Service: ENT;  Laterality: Left;    LIGATION, ARTERY, SPHENOPALATINE, ENDOSCOPIC Bilateral 6/14/2023    Procedure: LIGATION, ARTERY, SPHENOPALATINE, ENDOSCOPIC;  Surgeon: Jono Russell MD;  Location: [facility] OR 2ND FLR;  Service: ENT;  Laterality: Bilateral;    NASAL ENDOSCOPY N/A 8/8/2024    Procedure: ENDOSCOPY, NOSE;  Surgeon: Jono Russell MD;  Location: [facility] OR 2ND FLR;  Service: ENT;  Laterality: N/A;    PHACOEMULSIFICATION OF CATARACT Left 11/13/2022    Procedure: PHACOEMULSIFICATION, CATARACT;  Surgeon: Alia Mckeon MD;  Location: [facility] OR 1ST FLR;  Service: Ophthalmology;  Laterality: Left;    PHACOEMULSIFICATION OF CATARACT Right 12/04/2022    Procedure: PHACOEMULSIFICATION, CATARACT;  Surgeon: Alia Mckeon MD;  Location: [facility] OR 1ST FLR;  Service: Ophthalmology;  Laterality: Right;    SPINE SURGERY       TRANSESOPHAGEAL ECHOCARDIOGRAPHY N/A 3/22/2023    Procedure: ECHOCARDIOGRAM, TRANSESOPHAGEAL;  Surgeon: [facility] Diagnostic Provider;  Location: [facility] EP LAB;  Service: Anesthesiology;  Laterality: N/A;    TRANSESOPHAGEAL ECHOCARDIOGRAPHY N/A 2023    Procedure: ECHOCARDIOGRAM, TRANSESOPHAGEAL;  Surgeon: [facility] Diagnostic Provider;  Location: [facility] EP LAB;  Service: Anesthesiology;  Laterality: N/A;    VASECTOMY       Family History       Problem Relation (Age of Onset)    Alcohol abuse Father    Diabetes Brother    Heart disease Mother, Father, Brother, Sister    Heart failure Mother, Father, Brother    No Known Problems Sister, Maternal Grandmother, Maternal Grandfather, Paternal Grandmother, Paternal Grandfather, Maternal Aunt, Maternal Uncle, Paternal Aunt, Paternal Uncle          Tobacco Use    Smoking status: Former     Current packs/day: 0.00     Average packs/day: 1 pack/day for 20.0 years (20.0 ttl pk-yrs)     Types: Cigarettes     Start date: 1960     Quit date: 1980     Years since quittin.2     Passive exposure: Never    Smokeless tobacco: Never   Substance and Sexual Activity    Alcohol use: No    Drug use: No    Sexual activity: Not Currently     Partners: Female     Review of Systems  Objective:     Vital Signs (Most Recent):  Temp: 97.8 °F (36.6 °C) (24 1501)  Pulse: 63 (24 1520)  Resp: 18 (24 1520)  BP: (!) 156/72 (ENT at bedside suctioning pt.) (24 1501)  SpO2: 100 % (24 1520) Vital Signs (24h Range):  Temp:  [97.4 °F (36.3 °C)-97.8 °F (36.6 °C)] 97.8 °F (36.6 °C)  Pulse:  [63-85] 63  Resp:  [16-20] 18  SpO2:  [100 %] 100 %  BP: (137-156)/(56-72) 156/72     Weight: 70.8 kg (156 lb)  Body mass index is 25.18 kg/m².         Physical Exam  NAD  Head atraumatic   EOMI   Auricles WNL AU  Bilateral nares with dry mucosa, septal perforation anteriorly (~1.5cm), bilateral merocel packing in place with fibrillar around packing   Clot in posterior oropharynx   Neck  soft  Unlabored breathing, no stridor         Significant Labs:  CBC:   Recent Labs   Lab 12/13/24  1252   WBC 5.40   RBC 1.96*   HGB 6.0*   HCT 19.5*      *   MCH 30.6   MCHC 30.8*     CMP:   Recent Labs   Lab 12/13/24  1252   *   CALCIUM 9.6   ALBUMIN 3.1*   PROT 6.6      K 4.5   CO2 20*      BUN 58*   CREATININE 2.6*   ALKPHOS 64   ALT 11   AST 15   BILITOT 0.4       Significant Diagnostics:  None

## 2024-12-13 NOTE — ASSESSMENT & PLAN NOTE
85 yo male with heart valve on [place] and recurrent epistaxis presents to ER due to severe epistaxis. Currently packed with bilateral merocels. Hgb 6. Getting transfused with pRBC and will be admitted to medicine for  close monitoring.    - Maintain bilateral merocel  - Please place patient on anti-staph antibiotics while packing is in place  - Afrin to bilateral nares q8h for the next 48h  - May place mustache dressing under nose for trickling, ok if dressing becomes saturated with some red/brown drainage  - Nasal saline q4h while packing in place, even on side with nasal packing. Can continue nasal saline sprays to keep mucosa moist to prevent future bleeding.  - Keep head of bed elevated  - Apply Afrin to affected nasal cavity if epistaxis recurs, please see detailed instructions below   - Please call ENT with questions  - Remainder of care per primary team        PATIENT INSTRUCTIONS:    Nasal precautions  - DO NOT blow your nose for 2 weeks. The only exception is that you may lightly blow your nose after using a sinus rinse.  - Sneeze/cough with mouth open  - Avoid irritating substances that might make you sneeze, such as dust, chalk, harsh chemicals, and allergic triggers. This might also include spicy foods.  - Do not smoke   - Avoid flying or swimming for 2 weeks.    - Avoid all heavy lifting, straining or bending for 2 weeks.   - Avoid semi-contact sports or vigorous exercising for 3-4 weeks.      Aftercare/ moisturizing recommendations to decrease frequency of nosebleeds:  - Daily nasal saline sprays/rinses  - Nightly application of vaseline/aquaphor to anterior nares  - Room humidification  - Avoidance of nasal cannula if possible (always with humidification and please use face tent, nasal cup, facemask if possible)  Management of medical conditions contributing to epistaxis (coagulopathy, hypertension, etc.)  - Humidification of CPAP appliance if applicable    If rebleeding occurs:   - Apply Afrin  liberally to both nostrils  - Apply pressure over the soft part of the nose for 15 minutes (clip or digital pressure, important to have pressure over soft part of nose and consistent pressure (do not release pressure at any point))  - Instruct patient to lean forward, avoid swallowing blood. This can cause nausea and vomiting  - Can repeat these steps above up to 3 times  - Call/reconsult ENT or return to the nearest emergency department if this is unsuccessful

## 2024-12-14 LAB
ANION GAP SERPL CALC-SCNC: 9 MMOL/L (ref 8–16)
BASOPHILS # BLD AUTO: 0.03 K/UL (ref 0–0.2)
BASOPHILS # BLD AUTO: 0.05 K/UL (ref 0–0.2)
BASOPHILS NFR BLD: 0.6 % (ref 0–1.9)
BASOPHILS NFR BLD: 0.7 % (ref 0–1.9)
BLD PROD TYP BPU: NORMAL
BLOOD UNIT EXPIRATION DATE: NORMAL
BLOOD UNIT TYPE CODE: 9500
BLOOD UNIT TYPE: NORMAL
BUN SERPL-MCNC: 59 MG/DL (ref 8–23)
CALCIUM SERPL-MCNC: 9.7 MG/DL (ref 8.7–10.5)
CHLORIDE SERPL-SCNC: 111 MMOL/L (ref 95–110)
CO2 SERPL-SCNC: 19 MMOL/L (ref 23–29)
CODING SYSTEM: NORMAL
CREAT SERPL-MCNC: 2.3 MG/DL (ref 0.5–1.4)
CROSSMATCH INTERPRETATION: NORMAL
DIFFERENTIAL METHOD BLD: ABNORMAL
DIFFERENTIAL METHOD BLD: ABNORMAL
DISPENSE STATUS: NORMAL
EOSINOPHIL # BLD AUTO: 0.2 K/UL (ref 0–0.5)
EOSINOPHIL # BLD AUTO: 0.2 K/UL (ref 0–0.5)
EOSINOPHIL NFR BLD: 2.8 % (ref 0–8)
EOSINOPHIL NFR BLD: 4.4 % (ref 0–8)
ERYTHROCYTE [DISTWIDTH] IN BLOOD BY AUTOMATED COUNT: 16.5 % (ref 11.5–14.5)
ERYTHROCYTE [DISTWIDTH] IN BLOOD BY AUTOMATED COUNT: 16.6 % (ref 11.5–14.5)
EST. GFR  (NO RACE VARIABLE): 27.5 ML/MIN/1.73 M^2
GLUCOSE SERPL-MCNC: 88 MG/DL (ref 70–110)
HCT VFR BLD AUTO: 20.3 % (ref 40–54)
HCT VFR BLD AUTO: 24.7 % (ref 40–54)
HGB BLD-MCNC: 6.2 G/DL (ref 14–18)
HGB BLD-MCNC: 8 G/DL (ref 14–18)
IMM GRANULOCYTES # BLD AUTO: 0.02 K/UL (ref 0–0.04)
IMM GRANULOCYTES # BLD AUTO: 0.03 K/UL (ref 0–0.04)
IMM GRANULOCYTES NFR BLD AUTO: 0.3 % (ref 0–0.5)
IMM GRANULOCYTES NFR BLD AUTO: 0.6 % (ref 0–0.5)
LYMPHOCYTES # BLD AUTO: 0.8 K/UL (ref 1–4.8)
LYMPHOCYTES # BLD AUTO: 1.3 K/UL (ref 1–4.8)
LYMPHOCYTES NFR BLD: 14.2 % (ref 18–48)
LYMPHOCYTES NFR BLD: 19.4 % (ref 18–48)
MAGNESIUM SERPL-MCNC: 2 MG/DL (ref 1.6–2.6)
MCH RBC QN AUTO: 29.4 PG (ref 27–31)
MCH RBC QN AUTO: 30.9 PG (ref 27–31)
MCHC RBC AUTO-ENTMCNC: 30.5 G/DL (ref 32–36)
MCHC RBC AUTO-ENTMCNC: 32.4 G/DL (ref 32–36)
MCV RBC AUTO: 95 FL (ref 82–98)
MCV RBC AUTO: 96 FL (ref 82–98)
MONOCYTES # BLD AUTO: 0.6 K/UL (ref 0.3–1)
MONOCYTES # BLD AUTO: 0.6 K/UL (ref 0.3–1)
MONOCYTES NFR BLD: 10.3 % (ref 4–15)
MONOCYTES NFR BLD: 8.4 % (ref 4–15)
NEUTROPHILS # BLD AUTO: 3.8 K/UL (ref 1.8–7.7)
NEUTROPHILS # BLD AUTO: 4.6 K/UL (ref 1.8–7.7)
NEUTROPHILS NFR BLD: 68.4 % (ref 38–73)
NEUTROPHILS NFR BLD: 69.9 % (ref 38–73)
NRBC BLD-RTO: 0 /100 WBC
NRBC BLD-RTO: 0 /100 WBC
PHOSPHATE SERPL-MCNC: 3.7 MG/DL (ref 2.7–4.5)
PLATELET # BLD AUTO: 148 K/UL (ref 150–450)
PLATELET # BLD AUTO: 155 K/UL (ref 150–450)
PMV BLD AUTO: 10.8 FL (ref 9.2–12.9)
PMV BLD AUTO: 11.1 FL (ref 9.2–12.9)
POCT GLUCOSE: 109 MG/DL (ref 70–110)
POCT GLUCOSE: 176 MG/DL (ref 70–110)
POCT GLUCOSE: 81 MG/DL (ref 70–110)
POCT GLUCOSE: 90 MG/DL (ref 70–110)
POTASSIUM SERPL-SCNC: 4.2 MMOL/L (ref 3.5–5.1)
RBC # BLD AUTO: 2.11 M/UL (ref 4.6–6.2)
RBC # BLD AUTO: 2.59 M/UL (ref 4.6–6.2)
SODIUM SERPL-SCNC: 139 MMOL/L (ref 136–145)
TRANS ERYTHROCYTES VOL PATIENT: NORMAL ML
WBC # BLD AUTO: 5.42 K/UL (ref 3.9–12.7)
WBC # BLD AUTO: 6.7 K/UL (ref 3.9–12.7)

## 2024-12-14 PROCEDURE — 36415 COLL VENOUS BLD VENIPUNCTURE: CPT | Mod: HCNC

## 2024-12-14 PROCEDURE — 36430 TRANSFUSION BLD/BLD COMPNT: CPT | Mod: HCNC

## 2024-12-14 PROCEDURE — P9021 RED BLOOD CELLS UNIT: HCPCS | Mod: HCNC | Performed by: STUDENT IN AN ORGANIZED HEALTH CARE EDUCATION/TRAINING PROGRAM

## 2024-12-14 PROCEDURE — 85025 COMPLETE CBC W/AUTO DIFF WBC: CPT | Mod: HCNC

## 2024-12-14 PROCEDURE — 21400001 HC TELEMETRY ROOM: Mod: HCNC

## 2024-12-14 PROCEDURE — 25000003 PHARM REV CODE 250: Mod: HCNC

## 2024-12-14 PROCEDURE — 80048 BASIC METABOLIC PNL TOTAL CA: CPT | Mod: HCNC

## 2024-12-14 PROCEDURE — 83735 ASSAY OF MAGNESIUM: CPT | Mod: HCNC

## 2024-12-14 PROCEDURE — 84100 ASSAY OF PHOSPHORUS: CPT | Mod: HCNC

## 2024-12-14 PROCEDURE — 99232 SBSQ HOSP IP/OBS MODERATE 35: CPT | Mod: HCNC,,, | Performed by: OTOLARYNGOLOGY

## 2024-12-14 PROCEDURE — 86920 COMPATIBILITY TEST SPIN: CPT | Mod: HCNC | Performed by: STUDENT IN AN ORGANIZED HEALTH CARE EDUCATION/TRAINING PROGRAM

## 2024-12-14 PROCEDURE — 093K7ZZ CONTROL BLEEDING IN NASAL MUCOSA AND SOFT TISSUE, VIA NATURAL OR ARTIFICIAL OPENING: ICD-10-PCS | Performed by: EMERGENCY MEDICINE

## 2024-12-14 RX ORDER — HYDROCODONE BITARTRATE AND ACETAMINOPHEN 500; 5 MG/1; MG/1
TABLET ORAL
Status: DISCONTINUED | OUTPATIENT
Start: 2024-12-14 | End: 2024-12-16

## 2024-12-14 RX ORDER — SODIUM BICARBONATE 650 MG/1
650 TABLET ORAL 2 TIMES DAILY
Status: DISCONTINUED | OUTPATIENT
Start: 2024-12-14 | End: 2024-12-18 | Stop reason: HOSPADM

## 2024-12-14 RX ADMIN — Medication 2 SPRAY: at 03:12

## 2024-12-14 RX ADMIN — NASAL 1 SPRAY: 6.5 SPRAY NASAL at 03:12

## 2024-12-14 RX ADMIN — ATORVASTATIN CALCIUM 80 MG: 40 TABLET, FILM COATED ORAL at 08:12

## 2024-12-14 RX ADMIN — NASAL 1 SPRAY: 6.5 SPRAY NASAL at 05:12

## 2024-12-14 RX ADMIN — MUPIROCIN: 20 OINTMENT TOPICAL at 08:12

## 2024-12-14 RX ADMIN — NASAL 1 SPRAY: 6.5 SPRAY NASAL at 11:12

## 2024-12-14 RX ADMIN — ACETAMINOPHEN 1000 MG: 500 TABLET ORAL at 05:12

## 2024-12-14 RX ADMIN — ALLOPURINOL 100 MG: 100 TABLET ORAL at 08:12

## 2024-12-14 RX ADMIN — DOXYCYCLINE HYCLATE 100 MG: 100 TABLET, COATED ORAL at 08:12

## 2024-12-14 RX ADMIN — Medication 2 SPRAY: at 09:12

## 2024-12-14 RX ADMIN — ISOSORBIDE MONONITRATE 60 MG: 60 TABLET, EXTENDED RELEASE ORAL at 09:12

## 2024-12-14 RX ADMIN — Medication 2 SPRAY: at 05:12

## 2024-12-14 RX ADMIN — SODIUM BICARBONATE 650 MG TABLET 650 MG: at 09:12

## 2024-12-14 RX ADMIN — NASAL 1 SPRAY: 6.5 SPRAY NASAL at 09:12

## 2024-12-14 RX ADMIN — BUMETANIDE 2 MG: 1 TABLET ORAL at 08:12

## 2024-12-14 RX ADMIN — DOXYCYCLINE HYCLATE 100 MG: 100 TABLET, COATED ORAL at 09:12

## 2024-12-14 RX ADMIN — NASAL 1 SPRAY: 6.5 SPRAY NASAL at 02:12

## 2024-12-14 RX ADMIN — Medication 324 MG: at 08:12

## 2024-12-14 NOTE — ASSESSMENT & PLAN NOTE
Chronic anticoagulation  - PT/INR 24/2.4 on admission  - Hold home warfarin for now given active bleeding   - PT/INR daily

## 2024-12-14 NOTE — PROVIDER PROGRESS NOTES - EMERGENCY DEPT.
Encounter Date: 12/13/2024    ED Physician Progress Notes        Physician Note:   Assumed care of patient from Dr. Rios at 1400 pending CBC.   Consented for blood transfusion, HGB 6 (down-trended from 7).   ENT to evaluate.   Admitted to Hospital Medicine to trend hemoglobin.

## 2024-12-14 NOTE — PROCEDURES - EMERGENCY DEPT.
Epistaxis Mgmt    Date/Time: 12/14/2024 7:14 AM    Performed by: Kyle Rios MD  Authorized by: Kyle Rios MD  Consent Done: Yes  Consent: Verbal consent obtained. Written consent not obtained.  Consent given by: patient  Patient understanding: patient states understanding of the procedure being performed  Patient consent: the patient's understanding of the procedure matches consent given  Patient identity confirmed: verbally with patient    Anesthesia:  Local Anesthetic: lidocaine 1% with epinephrine  Anesthetic total: 10 mL    Patient sedated: no  Treatment site: left anterior  Repair method: merocel sponge and oxymetazoline  Post-procedure assessment: bleeding decreased  Treatment complexity: complex  Recurrence: recurrence of recent bleed  Patient tolerance: Patient tolerated the procedure well with no immediate complications  Comments: Round 1: afrin then clamp.  Round 2:  lido with epi on merocele with clamp.

## 2024-12-14 NOTE — ED NOTES
Took report from Mónica GARCIA, and assumed care of pt at this time. Pt resting comfortably and independently repositioned in stretcher with bed locked in lowest position for safety. NAD noted at this time. Respirations even and unlabored and visible chest rise noted.  Patient offered bathroom assistance and denies need at this time. Pt instructed to call if assistance is needed. Pt on continuous cardiac monitoring. Call light within reach. Family at bedside. No needs at this time. Will continue to monitor.

## 2024-12-14 NOTE — ASSESSMENT & PLAN NOTE
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 Baseline creatinine is  2.2-2.4 . Most recent creatinine and eGFR are listed below.  Recent Labs     12/13/24  1252 12/14/24  0423   CREATININE 2.6* 2.3*   EGFRNORACEVR 23.7* 27.5*     Plan  - JIM is resolved  - Avoid nephrotoxins and renally dose meds for GFR listed above  - Monitor urine output, serial BMP, and adjust therapy as needed  - s/p IVFs in the ED

## 2024-12-14 NOTE — ASSESSMENT & PLAN NOTE
- Patient's FSGs are controlled on current hypoglycemics.   - Last A1c reviewed-   Lab Results   Component Value Date    HGBA1C 5.1 12/13/2024     - Most recent fingerstick glucose reviewed-   Recent Labs   Lab 12/13/24  1611 12/14/24  0745 12/14/24  1133   POCTGLUCOSE 114* 90 81     - Current correctional scale Low  Maintain anti-hyperglycemic dose as follows-   Antihyperglycemics (From admission, onward)    Start     Stop Route Frequency Ordered    12/13/24 1616  insulin aspart U-100 pen 0-5 Units         -- SubQ Before meals & nightly PRN 12/13/24 1517      - Diabetic diet when not NPO

## 2024-12-14 NOTE — PLAN OF CARE
Pt arrived to unit via w/c, AAOX4. Able to verbalize needs. Inupiat. Ambulated in room w/o difficulty. Tele monitor intact. Resp even and unlabored. Skin w/d to touch, ecchymotic. Bilateral nostril with nasal packing noted, nasal spray administered at this time as ordered. Cont of B/B. NAD noted at present. Spouse at bedside. Instructed pt to notify staff for assistance. Louise to room/times/staff. Will continue to monitor.  Problem: Adult Inpatient Plan of Care  Goal: Plan of Care Review  Outcome: Progressing  Flowsheets (Taken 12/13/2024 2235)  Plan of Care Reviewed With:   patient   spouse  Goal: Patient-Specific Goal (Individualized)  Outcome: Progressing  Goal: Absence of Hospital-Acquired Illness or Injury  Outcome: Progressing  Goal: Optimal Comfort and Wellbeing  Outcome: Progressing  Goal: Readiness for Transition of Care  Outcome: Progressing     Problem: Infection  Goal: Absence of Infection Signs and Symptoms  Outcome: Progressing     Problem: Fall Injury Risk  Goal: Absence of Fall and Fall-Related Injury  Outcome: Progressing     Problem: Diabetes Comorbidity  Goal: Blood Glucose Level Within Targeted Range  Outcome: Progressing     Problem: Acute Kidney Injury/Impairment  Goal: Fluid and Electrolyte Balance  Outcome: Progressing  Goal: Improved Oral Intake  Outcome: Progressing  Goal: Effective Renal Function  Outcome: Progressing  Intervention: Monitor and Support Renal Function  Flowsheets (Taken 12/13/2024 2235)  Medication Review/Management: medications reviewed

## 2024-12-14 NOTE — ED NOTES
Pt being transported to ED, blood transfusion just completed. Notified RADHA Lomeli about drawing CBC lab once pt arrives to ED.

## 2024-12-14 NOTE — PROGRESS NOTES
[facility]  Otorhinolaryngology-Head & Neck Surgery  Progress Note    Subjective:     Post-Op Info:  * No surgery found *      Hospital Day: 2     Interval History: NAEON. AFVSS. No further epistaxis. Getting 1U RBC this am.     Medications:  Continuous Infusions:  Scheduled Meds:   allopurinoL  100 mg Oral Daily    atorvastatin  80 mg Oral Daily    bumetanide  2 mg Oral Every other day    doxycycline  100 mg Oral Q12H    ferrous gluconate  324 mg Oral Daily with breakfast    isosorbide mononitrate  60 mg Oral QHS    mupirocin   Topical (Top) Daily    oxymetazoline  2 spray Each Nostril Q8H    sodium chloride  1 spray Each Nostril Q4H    tranexamic acid  500 mg Nebulization BID     PRN Meds:  Current Facility-Administered Medications:     0.9%  NaCl infusion (for blood administration), , Intravenous, Q24H PRN    0.9%  NaCl infusion (for blood administration), , Intravenous, Q24H PRN    acetaminophen, 1,000 mg, Oral, Q6H PRN    aluminum-magnesium hydroxide-simethicone, 30 mL, Oral, QID PRN    bisacodyL, 10 mg, Rectal, Daily PRN    dextrose 10%, 12.5 g, Intravenous, PRN    dextrose 10%, 25 g, Intravenous, PRN    glucagon (human recombinant), 1 mg, Intramuscular, PRN    glucose, 16 g, Oral, PRN    glucose, 24 g, Oral, PRN    insulin aspart U-100, 0-5 Units, Subcutaneous, QID (AC + HS) PRN    naloxone, 0.02 mg, Intravenous, PRN    ondansetron, 8 mg, Oral, Q8H PRN    polyethylene glycol, 17 g, Oral, BID PRN    prochlorperazine, 5 mg, Intravenous, Q6H PRN    simethicone, 1 tablet, Oral, QID PRN    sodium chloride 0.9%, 10 mL, Intravenous, Q12H PRN    traZODone, 50 mg, Oral, Nightly PRN     Review of patient's allergies indicates:   Allergen Reactions    Lisinopril     Losartan      Intolerance- elevates potassium level     Objective:     Vital Signs (24h Range):  Temp:  [97.4 °F (36.3 °C)-98.2 °F (36.8 °C)] 98 °F (36.7 °C)  Pulse:  [] 64  Resp:  [16-20] 18  SpO2:  [93 %-100 %] 98 %  BP:  (123-177)/(56-72) 123/66     Date 12/14/24 0700 - 12/15/24 0659   Shift 3754-8886 5993-3106 3464-9559 24 Hour Total   INTAKE   Blood 233   233   Shift Total(mL/kg) 233(3.4)   233(3.4)   OUTPUT   Shift Total(mL/kg)       Weight (kg) 67.6 67.6 67.6 67.6     Lines/Drains/Airways       Peripheral Intravenous Line  Duration                  Peripheral IV - Single Lumen 12/13/24 1253 18 G Left Antecubital <1 day         Peripheral IV - Single Lumen 12/13/24 1430 20 G Distal;Left;Posterior Forearm <1 day                     Physical Exam  NAD  Head atraumatic   EOMI   Auricles WNL AU  Bilateral nares with dry mucosa, septal perforation anteriorly (~1.5cm), bilateral merocel packing in place with fibrillar around packing. No active bleeding.  Clot in posterior oropharynx, no trickling  Neck soft  Unlabored breathing, no stridor       Significant Labs:  BMP:   Recent Labs   Lab 12/14/24  0423   GLU 88   *   CO2 19*   BUN 59*   CREATININE 2.3*   CALCIUM 9.7   MG 2.0     CBC:   Recent Labs   Lab 12/14/24  0423   WBC 6.70   RBC 2.11*   HGB 6.2*   HCT 20.3*      MCV 96   MCH 29.4   MCHC 30.5*       Significant Diagnostics:  I have reviewed and interpreted all pertinent imaging results/findings within the past 24 hours.  Assessment/Plan:     * Recurrent epistaxis  83 yo male with heart valve on Wafarin and recurrent epistaxis presents to ER due to severe epistaxis. Currently packed with bilateral merocels. Hgb 6. Getting transfused with pRBC and will be admitted to medicine for  close monitoring.    - No acute ENT intervention at this time, okay for diet  - Transfuse PRN  - Maintain bilateral merocel at least 3-5 days  - Please place patient on anti-staph antibiotics while packing is in place  - Afrin to bilateral nares q8h for the next 48h  - May place mustache dressing under nose for trickling, ok if dressing becomes saturated with some red/brown drainage  - Nasal saline q4h while packing in place, even on side  with nasal packing. Can continue nasal saline sprays to keep mucosa moist to prevent future bleeding.  - Keep head of bed elevated  - Apply Afrin to affected nasal cavity if epistaxis recurs, please see detailed instructions below   - Please call ENT with questions  - Remainder of care per primary team  - Please page ENT w questions or concerns        PATIENT INSTRUCTIONS:    Nasal precautions  - DO NOT blow your nose for 2 weeks. The only exception is that you may lightly blow your nose after using a sinus rinse.  - Sneeze/cough with mouth open  - Avoid irritating substances that might make you sneeze, such as dust, chalk, harsh chemicals, and allergic triggers. This might also include spicy foods.  - Do not smoke   - Avoid flying or swimming for 2 weeks.    - Avoid all heavy lifting, straining or bending for 2 weeks.   - Avoid semi-contact sports or vigorous exercising for 3-4 weeks.      Aftercare/ moisturizing recommendations to decrease frequency of nosebleeds:  - Daily nasal saline sprays/rinses  - Nightly application of vaseline/aquaphor to anterior nares  - Room humidification  - Avoidance of nasal cannula if possible (always with humidification and please use face tent, nasal cup, facemask if possible)  Management of medical conditions contributing to epistaxis (coagulopathy, hypertension, etc.)  - Humidification of CPAP appliance if applicable    If rebleeding occurs:   - Apply Afrin liberally to both nostrils  - Apply pressure over the soft part of the nose for 15 minutes (clip or digital pressure, important to have pressure over soft part of nose and consistent pressure (do not release pressure at any point))  - Instruct patient to lean forward, avoid swallowing blood. This can cause nausea and vomiting  - Can repeat these steps above up to 3 times  - Call/reconsult ENT or return to the nearest emergency department if this is unsuccessful          Cooper Ghotra MD  Otorhinolaryngology-Head & Neck  Surgery  [facility]

## 2024-12-14 NOTE — CONSULTS
[facility] was consulted for possible embolization of epistaxis.  Mr. Urbina is well known to our service, having had 2 prior embolizations.  The last embolization was performed bilaterally in multiple maxillary artery collaterals plus the left facial artery.  The patient and wife said it only completely stopped the bleeding for about a week.      I discussed his case with hospital med and ENT services, and he has no active bleeding apparent, although his H/H continues to drop.  The plan is to hold of on embolization today, follow the H/H throughout the weekend and call us if another embolization is required.  He will need anesthesia due to his comorbidities.   Thank you.

## 2024-12-14 NOTE — ASSESSMENT & PLAN NOTE
83 yo male with heart valve on Wafarin and recurrent epistaxis presents to ER due to severe epistaxis. Currently packed with bilateral merocels. Hgb 6. Getting transfused with pRBC and will be admitted to medicine for  close monitoring.    - No acute ENT intervention at this time, okay for diet  - Transfuse PRN  - Maintain bilateral merocel at least 3-5 days  - Please place patient on anti-staph antibiotics while packing is in place  - Afrin to bilateral nares q8h for the next 48h  - May place mustache dressing under nose for trickling, ok if dressing becomes saturated with some red/brown drainage  - Nasal saline q4h while packing in place, even on side with nasal packing. Can continue nasal saline sprays to keep mucosa moist to prevent future bleeding.  - Keep head of bed elevated  - Apply Afrin to affected nasal cavity if epistaxis recurs, please see detailed instructions below   - Please call ENT with questions  - Remainder of care per primary team  - Please page ENT w questions or concerns        PATIENT INSTRUCTIONS:    Nasal precautions  - DO NOT blow your nose for 2 weeks. The only exception is that you may lightly blow your nose after using a sinus rinse.  - Sneeze/cough with mouth open  - Avoid irritating substances that might make you sneeze, such as dust, chalk, harsh chemicals, and allergic triggers. This might also include spicy foods.  - Do not smoke   - Avoid flying or swimming for 2 weeks.    - Avoid all heavy lifting, straining or bending for 2 weeks.   - Avoid semi-contact sports or vigorous exercising for 3-4 weeks.      Aftercare/ moisturizing recommendations to decrease frequency of nosebleeds:  - Daily nasal saline sprays/rinses  - Nightly application of vaseline/aquaphor to anterior nares  - Room humidification  - Avoidance of nasal cannula if possible (always with humidification and please use face tent, nasal cup, facemask if possible)  Management of medical conditions contributing to  epistaxis (coagulopathy, hypertension, etc.)  - Humidification of CPAP appliance if applicable    If rebleeding occurs:   - Apply Afrin liberally to both nostrils  - Apply pressure over the soft part of the nose for 15 minutes (clip or digital pressure, important to have pressure over soft part of nose and consistent pressure (do not release pressure at any point))  - Instruct patient to lean forward, avoid swallowing blood. This can cause nausea and vomiting  - Can repeat these steps above up to 3 times  - Call/reconsult ENT or return to the nearest emergency department if this is unsuccessful

## 2024-12-14 NOTE — ASSESSMENT & PLAN NOTE
- 84 y.o. M on chronic anticoagulation with warfarin and extensive history of recurrent epistaxis s/p bilateral SP ligation followed by bilateral embolization, cautery on the left septum x2 for persistent bleeding (11/2023 & 12/2023), left AEA ligation (2/2024) and repeat embolization in 08/2024 presenting with recurrent epistaxis   - Hgb 6.0 from baseline ~8.0   - Nasal clamp placed in the ED  - ENT consulted, appreciate recs:   - No acute ENT intervention at this time, okay for diet  - Transfuse PRN  - Maintain bilateral merocel at least 3-5 days  - Please place patient on anti-staph antibiotics while packing is in place  - Afrin to bilateral nares q8h for the next 48h  - May place mustache dressing under nose for trickling, ok if dressing becomes saturated with some red/brown drainage  - Nasal saline q4h while packing in place, even on side with nasal packing. Can continue nasal saline sprays to keep mucosa moist to prevent future bleeding.  - Keep head of bed elevated  - Apply Afrin to affected nasal cavity if epistaxis recurs, please see detailed instructions below   - Please call ENT with questions  - Remainder of care per primary team  - Please page ENT w questions or concerns

## 2024-12-14 NOTE — SUBJECTIVE & OBJECTIVE
Interval History: Pt seen and examined by me this morning. JAKE OLIVO. Hgb back down to 6.2 from 8.1 overnight. Pt denies recurrent bleeding out of his nose (around bilateral packing) since he was re-evaluated by ENT last night. He continues to have intermittent post-nasal/oropharyngeal bleeding, which he reports is improving, as well as persistent and non-improving dizziness, fatigue, SOB, and palpitations.     Review of Systems   Constitutional:  Positive for activity change and fatigue. Negative for chills and fever.   HENT:  Negative for trouble swallowing.    Eyes:  Negative for photophobia and visual disturbance.   Respiratory:  Positive for shortness of breath. Negative for chest tightness and wheezing.    Cardiovascular:  Positive for palpitations. Negative for chest pain and leg swelling.   Gastrointestinal:  Negative for abdominal pain, constipation, diarrhea, nausea and vomiting.   Genitourinary:  Negative for dysuria, frequency, hematuria and urgency.   Musculoskeletal:  Negative for arthralgias, back pain and gait problem.   Skin:  Negative for color change and rash.   Neurological:  Positive for dizziness and light-headedness. Negative for syncope, weakness, numbness and headaches.   Psychiatric/Behavioral:  Negative for agitation and confusion. The patient is not nervous/anxious.      Objective:     Vital Signs (Most Recent):  Temp: 97.6 °F (36.4 °C) (12/14/24 1132)  Pulse: (!) 58 (12/14/24 1132)  Resp: 20 (12/14/24 1132)  BP: (!) 163/62 (12/14/24 1132)  SpO2: 95 % (12/14/24 1132) Vital Signs (24h Range):  Temp:  [97.5 °F (36.4 °C)-98.2 °F (36.8 °C)] 97.6 °F (36.4 °C)  Pulse:  [] 58  Resp:  [16-20] 20  SpO2:  [93 %-100 %] 95 %  BP: (123-177)/(59-72) 163/62     Weight: 67.6 kg (149 lb 0.5 oz)  Body mass index is 24.05 kg/m².    Intake/Output Summary (Last 24 hours) at 12/14/2024 1219  Last data filed at 12/14/2024 0758  Gross per 24 hour   Intake 1582 ml   Output --   Net 1582 ml          Physical Exam  Vitals and nursing note reviewed.   Constitutional:       General: He is not in acute distress.     Appearance: He is well-developed. He is ill-appearing.   HENT:      Head: Normocephalic and atraumatic.      Nose:      Comments: Bilateral merocel packing in place with fibrillar around packing. No active bleeding from his packing (as seen prior)  Blood clot in posterior oropharynx without evidence of active trickling     Mouth/Throat:      Mouth: Mucous membranes are dry.   Eyes:      Extraocular Movements: Extraocular movements intact.      Conjunctiva/sclera: Conjunctivae normal.   Cardiovascular:      Rate and Rhythm: Normal rate and regular rhythm.      Heart sounds: Normal heart sounds.   Pulmonary:      Effort: Pulmonary effort is normal. No respiratory distress.      Breath sounds: Normal breath sounds. No wheezing.   Abdominal:      General: Bowel sounds are normal. There is no distension.      Palpations: Abdomen is soft.      Tenderness: There is no abdominal tenderness.   Musculoskeletal:         General: No tenderness. Normal range of motion.      Cervical back: Normal range of motion and neck supple.   Skin:     General: Skin is warm and dry.      Capillary Refill: Capillary refill takes less than 2 seconds.      Coloration: Skin is pale.      Findings: No rash.   Neurological:      Mental Status: He is alert and oriented to person, place, and time.      Cranial Nerves: No cranial nerve deficit.      Sensory: No sensory deficit.      Coordination: Coordination normal.   Psychiatric:         Behavior: Behavior normal.         Thought Content: Thought content normal.         Judgment: Judgment normal.             Significant Labs: All pertinent labs within the past 24 hours have been reviewed.  CBC:   Recent Labs   Lab 12/13/24  1252 12/13/24  1859 12/14/24  0423   WBC 5.40 10.75 6.70   HGB 6.0* 8.1* 6.2*   HCT 19.5* 24.6* 20.3*    174 155       Significant Imaging: I have reviewed  all pertinent imaging results/findings within the past 24 hours.

## 2024-12-14 NOTE — PROGRESS NOTES
[facility] [address] [place] [address] Medicine  Progress Note    Patient Name: Dariel Urbina  MRN: [id number]  Patient Class: IP- Inpatient   Admission Date: 12/13/2024  Length of Stay: 0 days  Attending Physician: Cody Carrington MD  Primary Care Provider: Devon Langston Jr., MD    Principal Problem:Recurrent epistaxis    HPI:  Dariel Urbina is a 84 y.o. M with HTN, HLD, combined CHF, paroxysmal A fib, CAD s/p CABG and stent placement,  h/o mechanical aortic valve replacement on warfarin, T2DM, stage IV CKD, gout, and recurrent epistaxis s/p bilateral SP ligation followed by bilateral embolization, cautery on the left septum x2 for persistent bleeding (11/2023 & 12/2023), left AEA ligation (2/2024) and repeat embolization in 08/2024 presenting with recurrent epistaxis. The patient reports his most recent episode of epistaxis started ~3-4 days ago and has been intermittent with persistent worsening and did not stop with persistent pressure and afrin. Pt endorses associated dizziness, lightheadedness, palpitations, and SOB but otherwise denies fever, chills, chest pain, abdominal pain, N/V/D, dysuria, leg swelling or pain, HA, or syncope.       In the ED: Hypertensive to 170s/70s, o/w VSSAF. CBC with Hgb 6.0 (from baseline ~8), no leukocytosis. PT 24/INR 2.4. CMP with Cr 2.6 (from baseline 2.2-2.4). ED provider attempted nasal packing without significant improvement. The patient was given 1u pRBCs, afrin, and NS bolus. ENT consulted & patient was placed in observation for further management.     Overview/Hospital Course:  Mr. Urbina was admitted to hospital medicine for recurrent epistaxis and symptomatic anemia. Hgb 6.0 (from baseline of ~8) on admission, which improved to 8.1 s/p 1U pRBCs but unfortunately dropped back down to 6.2 this morning. Additional 1U pRBCs ordered on 12/14. ENT consulted and placed bilateral merocel packing with fibrillar around the packing. IR consulted for consideration of  inpatient embolization.     Interval History: Pt seen and examined by me this morning. JAKE OLIVO. Hgb back down to 6.2 from 8.1 overnight. Pt denies recurrent bleeding out of his nose (around bilateral packing) since he was re-evaluated by ENT last night. He continues to have intermittent post-nasal/oropharyngeal bleeding, which he reports is improving, as well as persistent and non-improving dizziness, fatigue, SOB, and palpitations.     Review of Systems   Constitutional:  Positive for activity change and fatigue. Negative for chills and fever.   HENT:  Negative for trouble swallowing.    Eyes:  Negative for photophobia and visual disturbance.   Respiratory:  Positive for shortness of breath. Negative for chest tightness and wheezing.    Cardiovascular:  Positive for palpitations. Negative for chest pain and leg swelling.   Gastrointestinal:  Negative for abdominal pain, constipation, diarrhea, nausea and vomiting.   Genitourinary:  Negative for dysuria, frequency, hematuria and urgency.   Musculoskeletal:  Negative for arthralgias, back pain and gait problem.   Skin:  Negative for color change and rash.   Neurological:  Positive for dizziness and light-headedness. Negative for syncope, weakness, numbness and headaches.   Psychiatric/Behavioral:  Negative for agitation and confusion. The patient is not nervous/anxious.      Objective:     Vital Signs (Most Recent):  Temp: 97.6 °F (36.4 °C) (12/14/24 1132)  Pulse: (!) 58 (12/14/24 1132)  Resp: 20 (12/14/24 1132)  BP: (!) 163/62 (12/14/24 1132)  SpO2: 95 % (12/14/24 1132) Vital Signs (24h Range):  Temp:  [97.5 °F (36.4 °C)-98.2 °F (36.8 °C)] 97.6 °F (36.4 °C)  Pulse:  [] 58  Resp:  [16-20] 20  SpO2:  [93 %-100 %] 95 %  BP: (123-177)/(59-72) 163/62     Weight: 67.6 kg (149 lb 0.5 oz)  Body mass index is 24.05 kg/m².    Intake/Output Summary (Last 24 hours) at 12/14/2024 1219  Last data filed at 12/14/2024 0758  Gross per 24 hour   Intake 1582 ml   Output --    Net 1582 ml         Physical Exam  Vitals and nursing note reviewed.   Constitutional:       General: He is not in acute distress.     Appearance: He is well-developed. He is ill-appearing.   HENT:      Head: Normocephalic and atraumatic.      Nose:      Comments: Bilateral merocel packing in place with fibrillar around packing. No active bleeding from his packing (as seen prior)  Blood clot in posterior oropharynx without evidence of active trickling     Mouth/Throat:      Mouth: Mucous membranes are dry.   Eyes:      Extraocular Movements: Extraocular movements intact.      Conjunctiva/sclera: Conjunctivae normal.   Cardiovascular:      Rate and Rhythm: Normal rate and regular rhythm.      Heart sounds: Normal heart sounds.   Pulmonary:      Effort: Pulmonary effort is normal. No respiratory distress.      Breath sounds: Normal breath sounds. No wheezing.   Abdominal:      General: Bowel sounds are normal. There is no distension.      Palpations: Abdomen is soft.      Tenderness: There is no abdominal tenderness.   Musculoskeletal:         General: No tenderness. Normal range of motion.      Cervical back: Normal range of motion and neck supple.   Skin:     General: Skin is warm and dry.      Capillary Refill: Capillary refill takes less than 2 seconds.      Coloration: Skin is pale.      Findings: No rash.   Neurological:      Mental Status: He is alert and oriented to person, place, and time.      Cranial Nerves: No cranial nerve deficit.      Sensory: No sensory deficit.      Coordination: Coordination normal.   Psychiatric:         Behavior: Behavior normal.         Thought Content: Thought content normal.         Judgment: Judgment normal.             Significant Labs: All pertinent labs within the past 24 hours have been reviewed.  CBC:   Recent Labs   Lab 12/13/24  1252 12/13/24  1859 12/14/24  0423   WBC 5.40 10.75 6.70   HGB 6.0* 8.1* 6.2*   HCT 19.5* 24.6* 20.3*    174 155       Significant  Imaging: I have reviewed all pertinent imaging results/findings within the past 24 hours.    Assessment and Plan     * Recurrent epistaxis  - 84 y.o. M on chronic anticoagulation with warfarin and extensive history of recurrent epistaxis s/p bilateral SP ligation followed by bilateral embolization, cautery on the left septum x2 for persistent bleeding (11/2023 & 12/2023), left AEA ligation (2/2024) and repeat embolization in 08/2024 presenting with recurrent epistaxis   - Hgb 6.0 from baseline ~8.0   - Nasal clamp placed in the ED  - ENT consulted, appreciate recs:   - No acute ENT intervention at this time, okay for diet  - Transfuse PRN  - Maintain bilateral merocel at least 3-5 days  - Please place patient on anti-staph antibiotics while packing is in place  - Afrin to bilateral nares q8h for the next 48h  - May place mustache dressing under nose for trickling, ok if dressing becomes saturated with some red/brown drainage  - Nasal saline q4h while packing in place, even on side with nasal packing. Can continue nasal saline sprays to keep mucosa moist to prevent future bleeding.  - Keep head of bed elevated  - Apply Afrin to affected nasal cavity if epistaxis recurs, please see detailed instructions below   - Please call ENT with questions  - Remainder of care per primary team  - Please page ENT w questions or concerns    Coronary artery disease involving native coronary artery of native heart without angina pectoris  Patient with known CAD s/p stent placement and CABG, which is controlled Will continue Statin and monitor for S/Sx of angina/ACS. Continue to monitor on telemetry.     JIM (acute kidney injury)  Now back at baseline CKD IV  JIM is likely due to pre-renal azotemia due to intravascular volume depletion. Baseline creatinine is  2.2-2.4 . Most recent creatinine and eGFR are listed below.  Recent Labs     12/13/24  1252 12/14/24  0423   CREATININE 2.6* 2.3*   EGFRNORACEVR 23.7* 27.5*     Plan  - JIM is  resolved  - Avoid nephrotoxins and renally dose meds for GFR listed above  - Monitor urine output, serial BMP, and adjust therapy as needed  - s/p IVFs in the ED    Acute blood loss anemia  Anemia is likely due to acute blood loss which was from epistaxis and Iron deficiency. Most recent hemoglobin and hematocrit are listed below.  Recent Labs     12/13/24  1252 12/13/24  1859 12/14/24  0423   HGB 6.0* 8.1* 6.2*   HCT 19.5* 24.6* 20.3*     Plan  - Monitor serial CBC: Every 12 hours  - Transfuse PRBC if patient becomes hemodynamically unstable, symptomatic or H/H drops below 7/21.  - Patient received 1 unit of PRBCs in the ED on 12/13, addition unit ordered this AM  - Patient's anemia is currently stable  - See epistaxis     Acidosis  - CO2 20 on BMP  - Likely 2/2 active bleeding  - Continue to monitor on daily labs     H/O mechanical aortic valve replacement  Chronic anticoagulation  - PT/INR 24/2.4 on admission  - Hold home warfarin for now given active bleeding   - PT/INR daily     Type 2 diabetes mellitus with stage 4 chronic kidney disease, without long-term current use of insulin  - Patient's FSGs are controlled on current hypoglycemics.   - Last A1c reviewed-   Lab Results   Component Value Date    HGBA1C 5.1 12/13/2024     - Most recent fingerstick glucose reviewed-   Recent Labs   Lab 12/13/24  1611 12/14/24  0745 12/14/24  1133   POCTGLUCOSE 114* 90 81     - Current correctional scale Low  Maintain anti-hyperglycemic dose as follows-   Antihyperglycemics (From admission, onward)      Start     Stop Route Frequency Ordered    12/13/24 1616  insulin aspart U-100 pen 0-5 Units         -- SubQ Before meals & nightly PRN 12/13/24 1517        - Diabetic diet when not NPO    Renovascular hypertension  Hypertension associated with diabetes  - Latest BP and vitals reviewed  - Continue home meds for HTN:   Hypertension Medications               bumetanide (BUMEX) 1 MG tablet Take 2 tablets (2 mg total) by mouth every  other day.    isosorbide mononitrate (IMDUR) 60 MG 24 hr tablet Take 1 tablet (60 mg total) by mouth every evening.   - Goal SBP 120s-140s. Utilize p.r.n. antihypertensives only if patient's BP >180/110 & develop symptoms of worsening CP or SOB.    Hyperlipidemia associated with type 2 diabetes mellitus  - Continue statin       VTE Risk Mitigation (From admission, onward)           Ordered     IP VTE HIGH RISK PATIENT  Once         12/13/24 1517     Place sequential compression device  Until discontinued         12/13/24 1517                    Discharge Planning   ASTER: 12/15/2024     Code Status: Full Code   Medical Readiness for Discharge Date:                            Jenn Frye PA-C  Department of Hospital Medicine   [facility]

## 2024-12-14 NOTE — ASSESSMENT & PLAN NOTE
Anemia is likely due to acute blood loss which was from epistaxis and Iron deficiency. Most recent hemoglobin and hematocrit are listed below.  Recent Labs     12/13/24  1252 12/13/24  1859 12/14/24  0423   HGB 6.0* 8.1* 6.2*   HCT 19.5* 24.6* 20.3*     Plan  - Monitor serial CBC: Every 12 hours  - Transfuse PRBC if patient becomes hemodynamically unstable, symptomatic or H/H drops below 7/21.  - Patient received 1 unit of PRBCs in the ED on 12/13, addition unit ordered this AM  - Patient's anemia is currently stable  - See epistaxis

## 2024-12-14 NOTE — SUBJECTIVE & OBJECTIVE
Interval History: NAEON. AFVSS. No further epistaxis. Getting 1U RBC this am.     Medications:  Continuous Infusions:  Scheduled Meds:   allopurinoL  100 mg Oral Daily    atorvastatin  80 mg Oral Daily    bumetanide  2 mg Oral Every other day    doxycycline  100 mg Oral Q12H    ferrous gluconate  324 mg Oral Daily with breakfast    isosorbide mononitrate  60 mg Oral QHS    mupirocin   Topical (Top) Daily    oxymetazoline  2 spray Each Nostril Q8H    sodium chloride  1 spray Each Nostril Q4H    tranexamic acid  500 mg Nebulization BID     PRN Meds:  Current Facility-Administered Medications:     0.9%  NaCl infusion (for blood administration), , Intravenous, Q24H PRN    0.9%  NaCl infusion (for blood administration), , Intravenous, Q24H PRN    acetaminophen, 1,000 mg, Oral, Q6H PRN    aluminum-magnesium hydroxide-simethicone, 30 mL, Oral, QID PRN    bisacodyL, 10 mg, Rectal, Daily PRN    dextrose 10%, 12.5 g, Intravenous, PRN    dextrose 10%, 25 g, Intravenous, PRN    glucagon (human recombinant), 1 mg, Intramuscular, PRN    glucose, 16 g, Oral, PRN    glucose, 24 g, Oral, PRN    insulin aspart U-100, 0-5 Units, Subcutaneous, QID (AC + HS) PRN    naloxone, 0.02 mg, Intravenous, PRN    ondansetron, 8 mg, Oral, Q8H PRN    polyethylene glycol, 17 g, Oral, BID PRN    prochlorperazine, 5 mg, Intravenous, Q6H PRN    simethicone, 1 tablet, Oral, QID PRN    sodium chloride 0.9%, 10 mL, Intravenous, Q12H PRN    traZODone, 50 mg, Oral, Nightly PRN     Review of patient's allergies indicates:   Allergen Reactions    Lisinopril     Losartan      Intolerance- elevates potassium level     Objective:     Vital Signs (24h Range):  Temp:  [97.4 °F (36.3 °C)-98.2 °F (36.8 °C)] 98 °F (36.7 °C)  Pulse:  [] 64  Resp:  [16-20] 18  SpO2:  [93 %-100 %] 98 %  BP: (123-177)/(56-72) 123/66     Date 12/14/24 0700 - 12/15/24 0659   Shift 7532-6064 4668-7775 6111-3502 24 Hour Total   INTAKE   Blood 233   233   Shift Total(mL/kg) 233(3.4)    233(3.4)   OUTPUT   Shift Total(mL/kg)       Weight (kg) 67.6 67.6 67.6 67.6     Lines/Drains/Airways       Peripheral Intravenous Line  Duration                  Peripheral IV - Single Lumen 12/13/24 1253 18 G Left Antecubital <1 day         Peripheral IV - Single Lumen 12/13/24 1430 20 G Distal;Left;Posterior Forearm <1 day                     Physical Exam  NAD  Head atraumatic   EOMI   Auricles WNL AU  Bilateral nares with dry mucosa, septal perforation anteriorly (~1.5cm), bilateral merocel packing in place with fibrillar around packing. No active bleeding.  Clot in posterior oropharynx, no trickling  Neck soft  Unlabored breathing, no stridor       Significant Labs:  BMP:   Recent Labs   Lab 12/14/24  0423   GLU 88   *   CO2 19*   BUN 59*   CREATININE 2.3*   CALCIUM 9.7   MG 2.0     CBC:   Recent Labs   Lab 12/14/24  0423   WBC 6.70   RBC 2.11*   HGB 6.2*   HCT 20.3*      MCV 96   MCH 29.4   MCHC 30.5*       Significant Diagnostics:  I have reviewed and interpreted all pertinent imaging results/findings within the past 24 hours.

## 2024-12-14 NOTE — HOSPITAL COURSE
Mr. Urbina was admitted to hospital medicine for recurrent epistaxis and symptomatic anemia. Hgb 6.0 (from baseline of ~8) on admission, which improved to 8.3 s/p 2u pRBCs. ENT consulted and placed bilateral merocel packing with fibrillar around the packing. Pt was started on topical mupirocin and PO doxycycline while packing was in place. IR consulted for consideration of inpatient embolization, which was deferred given hemostasis with packing in place. Therapeutic lovenox initiated on 12/15 with active bridging to therapeutic coumadin underway. R nares packing was removed on 12/16 without active re-bleeding. L nares packing removed on 12/17. Pt with 4-5 episodes of loose stools on evening of 12/17. C. Diff ordered and later cancelled given complete resolution of diarrhea with peptobismol and probiotiocs. Pt had a mild, brief episode of bleeding from the L nares on the AM on 12/18, which was quickly controlled with Afrin and pressure. Repeat Hgb following re-bleed improved from 7.9 -> 8.4.     Pt was seen and evaluated by me this morning, reports feeling well, and is very eager to discharge home. All questions were answered. Patient acknowledged understanding of discharge instructions and feels safe to discharge home. Patient was discharged on 12/18/2024 in stable condition with ENT follow-up and acute care at home services. Education regarding condition provided and return precautions given.     *Of note, the patient was instructed to continue lovenox 60 mg daily, as well as warfarin 5 mg daily, (per pharmacy recs) until repeat INR on Friday 12/20. Outpatient coumadin clinic notified of plan to repeat INR and will continue anticoagulation recommendations from there.     Physical Exam  Gen: in NAD, appears stated age  Neuro: AAOx3, motor, sensory, and strength grossly intact BL  HEENT: EOMI, PERRLA; no JVD appreciated  CVS: RRR, no m/r/g  Resp: lungs CTAB, no w/r/r; no belabored breathing or accessory muscle use  appreciated   Abd: NTND, soft to palpation  Extrem: no UE or LE edema BL

## 2024-12-15 LAB
ANION GAP SERPL CALC-SCNC: 9 MMOL/L (ref 8–16)
BASOPHILS # BLD AUTO: 0.04 K/UL (ref 0–0.2)
BASOPHILS NFR BLD: 0.8 % (ref 0–1.9)
BUN SERPL-MCNC: 58 MG/DL (ref 8–23)
CALCIUM SERPL-MCNC: 9.6 MG/DL (ref 8.7–10.5)
CHLORIDE SERPL-SCNC: 109 MMOL/L (ref 95–110)
CO2 SERPL-SCNC: 20 MMOL/L (ref 23–29)
CREAT SERPL-MCNC: 2.6 MG/DL (ref 0.5–1.4)
DIFFERENTIAL METHOD BLD: ABNORMAL
EOSINOPHIL # BLD AUTO: 0.3 K/UL (ref 0–0.5)
EOSINOPHIL NFR BLD: 6.6 % (ref 0–8)
ERYTHROCYTE [DISTWIDTH] IN BLOOD BY AUTOMATED COUNT: 16.7 % (ref 11.5–14.5)
EST. GFR  (NO RACE VARIABLE): 23.7 ML/MIN/1.73 M^2
GLUCOSE SERPL-MCNC: 78 MG/DL (ref 70–110)
HCT VFR BLD AUTO: 25.6 % (ref 40–54)
HGB BLD-MCNC: 8.3 G/DL (ref 14–18)
IMM GRANULOCYTES # BLD AUTO: 0.01 K/UL (ref 0–0.04)
IMM GRANULOCYTES NFR BLD AUTO: 0.2 % (ref 0–0.5)
INR PPP: 2 (ref 0.8–1.2)
LYMPHOCYTES # BLD AUTO: 1.2 K/UL (ref 1–4.8)
LYMPHOCYTES NFR BLD: 24.4 % (ref 18–48)
MAGNESIUM SERPL-MCNC: 1.9 MG/DL (ref 1.6–2.6)
MCH RBC QN AUTO: 31.2 PG (ref 27–31)
MCHC RBC AUTO-ENTMCNC: 32.4 G/DL (ref 32–36)
MCV RBC AUTO: 96 FL (ref 82–98)
MONOCYTES # BLD AUTO: 0.6 K/UL (ref 0.3–1)
MONOCYTES NFR BLD: 11.2 % (ref 4–15)
NEUTROPHILS # BLD AUTO: 2.8 K/UL (ref 1.8–7.7)
NEUTROPHILS NFR BLD: 56.8 % (ref 38–73)
NRBC BLD-RTO: 0 /100 WBC
PHOSPHATE SERPL-MCNC: 3.4 MG/DL (ref 2.7–4.5)
PLATELET # BLD AUTO: 160 K/UL (ref 150–450)
PMV BLD AUTO: 11 FL (ref 9.2–12.9)
POCT GLUCOSE: 127 MG/DL (ref 70–110)
POCT GLUCOSE: 130 MG/DL (ref 70–110)
POCT GLUCOSE: 145 MG/DL (ref 70–110)
POCT GLUCOSE: 73 MG/DL (ref 70–110)
POTASSIUM SERPL-SCNC: 3.8 MMOL/L (ref 3.5–5.1)
PROTHROMBIN TIME: 21.4 SEC (ref 9–12.5)
RBC # BLD AUTO: 2.66 M/UL (ref 4.6–6.2)
SODIUM SERPL-SCNC: 138 MMOL/L (ref 136–145)
WBC # BLD AUTO: 4.99 K/UL (ref 3.9–12.7)

## 2024-12-15 PROCEDURE — 25000003 PHARM REV CODE 250: Mod: HCNC

## 2024-12-15 PROCEDURE — 83735 ASSAY OF MAGNESIUM: CPT | Mod: HCNC

## 2024-12-15 PROCEDURE — 85610 PROTHROMBIN TIME: CPT | Mod: HCNC

## 2024-12-15 PROCEDURE — 84100 ASSAY OF PHOSPHORUS: CPT | Mod: HCNC

## 2024-12-15 PROCEDURE — 21400001 HC TELEMETRY ROOM: Mod: HCNC

## 2024-12-15 PROCEDURE — 80048 BASIC METABOLIC PNL TOTAL CA: CPT | Mod: HCNC

## 2024-12-15 PROCEDURE — 63600175 PHARM REV CODE 636 W HCPCS: Mod: HCNC

## 2024-12-15 PROCEDURE — 85025 COMPLETE CBC W/AUTO DIFF WBC: CPT | Mod: HCNC

## 2024-12-15 PROCEDURE — 99232 SBSQ HOSP IP/OBS MODERATE 35: CPT | Mod: HCNC,,, | Performed by: OTOLARYNGOLOGY

## 2024-12-15 RX ORDER — ENOXAPARIN SODIUM 100 MG/ML
1 INJECTION SUBCUTANEOUS EVERY 12 HOURS
Status: DISCONTINUED | OUTPATIENT
Start: 2024-12-15 | End: 2024-12-16

## 2024-12-15 RX ADMIN — DOXYCYCLINE HYCLATE 100 MG: 100 TABLET, COATED ORAL at 08:12

## 2024-12-15 RX ADMIN — ISOSORBIDE MONONITRATE 60 MG: 60 TABLET, EXTENDED RELEASE ORAL at 08:12

## 2024-12-15 RX ADMIN — SODIUM BICARBONATE 650 MG TABLET 650 MG: at 08:12

## 2024-12-15 RX ADMIN — Medication 2 SPRAY: at 05:12

## 2024-12-15 RX ADMIN — ENOXAPARIN SODIUM 70 MG: 100 INJECTION SUBCUTANEOUS at 08:12

## 2024-12-15 RX ADMIN — ATORVASTATIN CALCIUM 80 MG: 40 TABLET, FILM COATED ORAL at 08:12

## 2024-12-15 RX ADMIN — Medication 324 MG: at 08:12

## 2024-12-15 RX ADMIN — NASAL 1 SPRAY: 6.5 SPRAY NASAL at 02:12

## 2024-12-15 RX ADMIN — MUPIROCIN: 20 OINTMENT TOPICAL at 09:12

## 2024-12-15 RX ADMIN — ALLOPURINOL 100 MG: 100 TABLET ORAL at 08:12

## 2024-12-15 RX ADMIN — Medication 2 SPRAY: at 01:12

## 2024-12-15 RX ADMIN — NASAL 1 SPRAY: 6.5 SPRAY NASAL at 05:12

## 2024-12-15 RX ADMIN — NASAL 1 SPRAY: 6.5 SPRAY NASAL at 10:12

## 2024-12-15 RX ADMIN — Medication 2 SPRAY: at 10:12

## 2024-12-15 RX ADMIN — NASAL 1 SPRAY: 6.5 SPRAY NASAL at 06:12

## 2024-12-15 NOTE — ASSESSMENT & PLAN NOTE
- CO2 20 on BMP  - Likely 2/2 active bleeding  - Continue sodium bicarb supplementation for now  - Continue to monitor on daily labs

## 2024-12-15 NOTE — PROGRESS NOTES
[facility]  Otorhinolaryngology-Head & Neck Surgery  Progress Note    Subjective:     Post-Op Info:  * No surgery found *      Hospital Day: 3     Interval History: No bleeding overnight. Hgb stable this AM at 8.3.    Medications:  Continuous Infusions:  Scheduled Meds:   allopurinoL  100 mg Oral Daily    atorvastatin  80 mg Oral Daily    bumetanide  2 mg Oral Every other day    doxycycline  100 mg Oral Q12H    enoxparin  1 mg/kg Subcutaneous Q12H (treatment, non-standard time)    ferrous gluconate  324 mg Oral Daily with breakfast    isosorbide mononitrate  60 mg Oral QHS    mupirocin   Topical (Top) Daily    oxymetazoline  2 spray Each Nostril Q8H    sodium bicarbonate  650 mg Oral BID    sodium chloride  1 spray Each Nostril Q4H     PRN Meds:  Current Facility-Administered Medications:     0.9%  NaCl infusion (for blood administration), , Intravenous, Q24H PRN    0.9%  NaCl infusion (for blood administration), , Intravenous, Q24H PRN    acetaminophen, 1,000 mg, Oral, Q6H PRN    aluminum-magnesium hydroxide-simethicone, 30 mL, Oral, QID PRN    bisacodyL, 10 mg, Rectal, Daily PRN    dextrose 10%, 12.5 g, Intravenous, PRN    dextrose 10%, 25 g, Intravenous, PRN    glucagon (human recombinant), 1 mg, Intramuscular, PRN    glucose, 16 g, Oral, PRN    glucose, 24 g, Oral, PRN    insulin aspart U-100, 0-5 Units, Subcutaneous, QID (AC + HS) PRN    naloxone, 0.02 mg, Intravenous, PRN    ondansetron, 8 mg, Oral, Q8H PRN    polyethylene glycol, 17 g, Oral, BID PRN    prochlorperazine, 5 mg, Intravenous, Q6H PRN    simethicone, 1 tablet, Oral, QID PRN    sodium chloride 0.9%, 10 mL, Intravenous, Q12H PRN    tranexamic acid, 500 mg, Nebulization, PRN    traZODone, 50 mg, Oral, Nightly PRN     Review of patient's allergies indicates:   Allergen Reactions    Lisinopril     Losartan      Intolerance- elevates potassium level     Objective:     Vital Signs (24h Range):  Temp:  [97.6 °F (36.4 °C)-98.4 °F (36.9 °C)]  98 °F (36.7 °C)  Pulse:  [52-59] 58  Resp:  [17-20] 17  SpO2:  [95 %-100 %] 97 %  BP: (115-163)/(53-63) 118/53       Lines/Drains/Airways       Peripheral Intravenous Line  Duration                  Peripheral IV - Single Lumen 12/13/24 1253 18 G Left Antecubital 1 day         Peripheral IV - Single Lumen 12/13/24 1430 20 G Distal;Left;Posterior Forearm 1 day                     Physical Exam  NAD  Head atraumatic   EOMI   Auricles WNL AU  Bilateral nares with dry mucosa, septal perforation anteriorly (~1.5cm), bilateral merocel packing in place with fibrillar around packing. No active bleeding.  Clot in posterior oropharynx, no trickling  Neck soft  Unlabored breathing, no stridor       Significant Labs:  CBC:   Recent Labs   Lab 12/15/24  0323   WBC 4.99   RBC 2.66*   HGB 8.3*   HCT 25.6*      MCV 96   MCH 31.2*   MCHC 32.4     CMP:   Recent Labs   Lab 12/13/24  1252 12/14/24  0423 12/15/24  0323   *   < > 78   CALCIUM 9.6   < > 9.6   ALBUMIN 3.1*  --   --    PROT 6.6  --   --       < > 138   K 4.5   < > 3.8   CO2 20*   < > 20*      < > 109   BUN 58*   < > 58*   CREATININE 2.6*   < > 2.6*   ALKPHOS 64  --   --    ALT 11  --   --    AST 15  --   --    BILITOT 0.4  --   --     < > = values in this interval not displayed.       Significant Diagnostics:  None  Assessment/Plan:     * Recurrent epistaxis  85 yo male with heart valve on [place] and recurrent epistaxis presents to ER due to severe epistaxis. Currently packed with bilateral merocels. Hgb 6. Getting transfused with pRBC and will be admitted to medicine for  close monitoring.    - No acute ENT intervention at this time, okay for diet  - Transfuse PRN  - Okay for anti-coagulation  - Maintain bilateral merocel at least 3-5 days - will likely serial remove starting 12/16  - Please place patient on anti-staph antibiotics while packing is in place  - May place mustache dressing under nose for trickling, ok if dressing becomes saturated  with some red/brown drainage  - Nasal saline q4h while packing in place, even on side with nasal packing. Can continue nasal saline sprays to keep mucosa moist to prevent future bleeding.  - Keep head of bed elevated  - Apply Afrin to affected nasal cavity if epistaxis recurs, please see detailed instructions below   - Please call ENT with questions          PATIENT INSTRUCTIONS:    Nasal precautions  - DO NOT blow your nose for 2 weeks. The only exception is that you may lightly blow your nose after using a sinus rinse.  - Sneeze/cough with mouth open  - Avoid irritating substances that might make you sneeze, such as dust, chalk, harsh chemicals, and allergic triggers. This might also include spicy foods.  - Do not smoke   - Avoid flying or swimming for 2 weeks.    - Avoid all heavy lifting, straining or bending for 2 weeks.   - Avoid semi-contact sports or vigorous exercising for 3-4 weeks.      Aftercare/ moisturizing recommendations to decrease frequency of nosebleeds:  - Daily nasal saline sprays/rinses  - Nightly application of vaseline/aquaphor to anterior nares  - Room humidification  - Avoidance of nasal cannula if possible (always with humidification and please use face tent, nasal cup, facemask if possible)  Management of medical conditions contributing to epistaxis (coagulopathy, hypertension, etc.)  - Humidification of CPAP appliance if applicable    If rebleeding occurs:   - Apply Afrin liberally to both nostrils  - Apply pressure over the soft part of the nose for 15 minutes (clip or digital pressure, important to have pressure over soft part of nose and consistent pressure (do not release pressure at any point))  - Instruct patient to lean forward, avoid swallowing blood. This can cause nausea and vomiting  - Can repeat these steps above up to 3 times  - Call/reconsult ENT or return to the nearest emergency department if this is unsuccessful          Ying Nath MD  Otorhinolaryngology-Head & Neck  Surgery  [facility]

## 2024-12-15 NOTE — ASSESSMENT & PLAN NOTE
Chronic anticoagulation  - PT/INR 24/2.4 on admission, now 21/2.0  - Discussed with pharmacy - plan to start therapeutic lovenox today with plan to restart warfarin in the am if hemostasis remains on therapeutic lovenox  - PT/INR daily

## 2024-12-15 NOTE — ASSESSMENT & PLAN NOTE
- Patient's FSGs are controlled on current hypoglycemics.   - Last A1c reviewed-   Lab Results   Component Value Date    HGBA1C 5.1 12/13/2024     - Most recent fingerstick glucose reviewed-   Recent Labs   Lab 12/14/24  1133 12/14/24  1531 12/14/24  2126 12/15/24  0739   POCTGLUCOSE 81 176* 109 73       - Current correctional scale Low  Maintain anti-hyperglycemic dose as follows-   Antihyperglycemics (From admission, onward)    Start     Stop Route Frequency Ordered    12/13/24 1616  insulin aspart U-100 pen 0-5 Units         -- SubQ Before meals & nightly PRN 12/13/24 1517      - Diabetic diet when not NPO

## 2024-12-15 NOTE — SUBJECTIVE & OBJECTIVE
Interval History: Pt seen and examined by me this morning. JAKE OLIVO. Hgb stable without evidence of re-bleeding in the last 24 hrs. He also reports interval improvement in dizziness, fatigue, SOB, and palpitations.     Review of Systems   Constitutional:  Positive for activity change and fatigue. Negative for chills and fever.   HENT:  Negative for trouble swallowing.    Eyes:  Negative for photophobia and visual disturbance.   Respiratory:  Positive for shortness of breath. Negative for chest tightness and wheezing.    Cardiovascular:  Positive for palpitations. Negative for chest pain and leg swelling.   Gastrointestinal:  Negative for abdominal pain, constipation, diarrhea, nausea and vomiting.   Genitourinary:  Negative for dysuria, frequency, hematuria and urgency.   Musculoskeletal:  Negative for arthralgias, back pain and gait problem.   Skin:  Negative for color change and rash.   Neurological:  Positive for dizziness and light-headedness. Negative for syncope, weakness, numbness and headaches.   Psychiatric/Behavioral:  Negative for agitation and confusion. The patient is not nervous/anxious.      Objective:     Vital Signs (Most Recent):  Temp: 98 °F (36.7 °C) (12/15/24 0730)  Pulse: (!) 58 (12/15/24 0730)  Resp: 17 (12/15/24 0730)  BP: (!) 118/53 (12/15/24 0730)  SpO2: 97 % (12/15/24 0730) Vital Signs (24h Range):  Temp:  [97.6 °F (36.4 °C)-98.4 °F (36.9 °C)] 98 °F (36.7 °C)  Pulse:  [52-59] 58  Resp:  [17-20] 17  SpO2:  [95 %-100 %] 97 %  BP: (115-163)/(53-63) 118/53     Weight: 67.6 kg (149 lb 0.5 oz)  Body mass index is 24.05 kg/m².  No intake or output data in the 24 hours ending 12/15/24 1044        Physical Exam  Vitals and nursing note reviewed.   Constitutional:       General: He is not in acute distress.     Appearance: He is well-developed. He is ill-appearing.   HENT:      Head: Normocephalic and atraumatic.      Nose:      Comments: Bilateral merocel packing in place with fibrillar around  packing. No active bleeding from his packing (as seen prior)  Blood clot in posterior oropharynx without evidence of active trickling     Mouth/Throat:      Mouth: Mucous membranes are dry.   Eyes:      Extraocular Movements: Extraocular movements intact.      Conjunctiva/sclera: Conjunctivae normal.   Cardiovascular:      Rate and Rhythm: Normal rate and regular rhythm.      Heart sounds: Normal heart sounds.   Pulmonary:      Effort: Pulmonary effort is normal. No respiratory distress.      Breath sounds: Normal breath sounds. No wheezing.   Abdominal:      General: Bowel sounds are normal. There is no distension.      Palpations: Abdomen is soft.      Tenderness: There is no abdominal tenderness.   Musculoskeletal:         General: No tenderness. Normal range of motion.      Cervical back: Normal range of motion and neck supple.   Skin:     General: Skin is warm and dry.      Capillary Refill: Capillary refill takes less than 2 seconds.      Coloration: Skin is pale (improving).      Findings: No rash.   Neurological:      Mental Status: He is alert and oriented to person, place, and time.      Cranial Nerves: No cranial nerve deficit.      Sensory: No sensory deficit.      Coordination: Coordination normal.   Psychiatric:         Behavior: Behavior normal.         Thought Content: Thought content normal.         Judgment: Judgment normal.             Significant Labs: All pertinent labs within the past 24 hours have been reviewed.  CBC:   Recent Labs   Lab 12/14/24  0423 12/14/24  1644 12/15/24  0323   WBC 6.70 5.42 4.99   HGB 6.2* 8.0* 8.3*   HCT 20.3* 24.7* 25.6*    148* 160       Significant Imaging: I have reviewed all pertinent imaging results/findings within the past 24 hours.

## 2024-12-15 NOTE — PROGRESS NOTES
[facility] [address] [place] [address] Medicine  Progress Note    Patient Name: Dariel Urbina  MRN: [id number]  Patient Class: IP- Inpatient   Admission Date: 12/13/2024  Length of Stay: 1 days  Attending Physician: Cody Carrington MD  Primary Care Provider: Devon Langston Jr., MD    Principal Problem:Recurrent epistaxis    HPI:  Dariel Urbina is a 84 y.o. M with HTN, HLD, combined CHF, paroxysmal A fib, CAD s/p CABG and stent placement,  h/o mechanical aortic valve replacement on warfarin, T2DM, stage IV CKD, gout, and recurrent epistaxis s/p bilateral SP ligation followed by bilateral embolization, cautery on the left septum x2 for persistent bleeding (11/2023 & 12/2023), left AEA ligation (2/2024) and repeat embolization in 08/2024 presenting with recurrent epistaxis. The patient reports his most recent episode of epistaxis started ~3-4 days ago and has been intermittent with persistent worsening and did not stop with persistent pressure and afrin. Pt endorses associated dizziness, lightheadedness, palpitations, and SOB but otherwise denies fever, chills, chest pain, abdominal pain, N/V/D, dysuria, leg swelling or pain, HA, or syncope.       In the ED: Hypertensive to 170s/70s, o/w VSSAF. CBC with Hgb 6.0 (from baseline ~8), no leukocytosis. PT 24/INR 2.4. CMP with Cr 2.6 (from baseline 2.2-2.4). ED provider attempted nasal packing without significant improvement. The patient was given 1u pRBCs, afrin, and NS bolus. ENT consulted & patient was placed in observation for further management.     Overview/Hospital Course:  Mr. Urbina was admitted to hospital medicine for recurrent epistaxis and symptomatic anemia. Hgb 6.0 (from baseline of ~8) on admission, which improved to 8.1 s/p 1U pRBCs but unfortunately dropped back down to 6.2 shortly after. Additional 1U pRBCs ordered on 12/14 with improvement in Hgb to 8.3. ENT consulted and placed bilateral merocel packing with fibrillar around the packing.  Anti-staph antibiotics in place. IR consulted for consideration of inpatient embolization, which was deferred given hemostasis with packing in place. Therapeutic lovenox initiated on 12/15 with plan to restart home coumadin dosing and trial serial packing removal on 12/16.     Interval History: Pt seen and examined by me this morning. JAKE OLIVO. Hgb stable without evidence of re-bleeding in the last 24 hrs. He also reports interval improvement in dizziness, fatigue, SOB, and palpitations.     Review of Systems   Constitutional:  Positive for activity change and fatigue. Negative for chills and fever.   HENT:  Negative for trouble swallowing.    Eyes:  Negative for photophobia and visual disturbance.   Respiratory:  Positive for shortness of breath. Negative for chest tightness and wheezing.    Cardiovascular:  Positive for palpitations. Negative for chest pain and leg swelling.   Gastrointestinal:  Negative for abdominal pain, constipation, diarrhea, nausea and vomiting.   Genitourinary:  Negative for dysuria, frequency, hematuria and urgency.   Musculoskeletal:  Negative for arthralgias, back pain and gait problem.   Skin:  Negative for color change and rash.   Neurological:  Positive for dizziness and light-headedness. Negative for syncope, weakness, numbness and headaches.   Psychiatric/Behavioral:  Negative for agitation and confusion. The patient is not nervous/anxious.      Objective:     Vital Signs (Most Recent):  Temp: 98 °F (36.7 °C) (12/15/24 0730)  Pulse: (!) 58 (12/15/24 0730)  Resp: 17 (12/15/24 0730)  BP: (!) 118/53 (12/15/24 0730)  SpO2: 97 % (12/15/24 0730) Vital Signs (24h Range):  Temp:  [97.6 °F (36.4 °C)-98.4 °F (36.9 °C)] 98 °F (36.7 °C)  Pulse:  [52-59] 58  Resp:  [17-20] 17  SpO2:  [95 %-100 %] 97 %  BP: (115-163)/(53-63) 118/53     Weight: 67.6 kg (149 lb 0.5 oz)  Body mass index is 24.05 kg/m².  No intake or output data in the 24 hours ending 12/15/24 1044        Physical Exam  Vitals  and nursing note reviewed.   Constitutional:       General: He is not in acute distress.     Appearance: He is well-developed. He is ill-appearing.   HENT:      Head: Normocephalic and atraumatic.      Nose:      Comments: Bilateral merocel packing in place with fibrillar around packing. No active bleeding from his packing (as seen prior)  Blood clot in posterior oropharynx without evidence of active trickling     Mouth/Throat:      Mouth: Mucous membranes are dry.   Eyes:      Extraocular Movements: Extraocular movements intact.      Conjunctiva/sclera: Conjunctivae normal.   Cardiovascular:      Rate and Rhythm: Normal rate and regular rhythm.      Heart sounds: Normal heart sounds.   Pulmonary:      Effort: Pulmonary effort is normal. No respiratory distress.      Breath sounds: Normal breath sounds. No wheezing.   Abdominal:      General: Bowel sounds are normal. There is no distension.      Palpations: Abdomen is soft.      Tenderness: There is no abdominal tenderness.   Musculoskeletal:         General: No tenderness. Normal range of motion.      Cervical back: Normal range of motion and neck supple.   Skin:     General: Skin is warm and dry.      Capillary Refill: Capillary refill takes less than 2 seconds.      Coloration: Skin is pale (improving).      Findings: No rash.   Neurological:      Mental Status: He is alert and oriented to person, place, and time.      Cranial Nerves: No cranial nerve deficit.      Sensory: No sensory deficit.      Coordination: Coordination normal.   Psychiatric:         Behavior: Behavior normal.         Thought Content: Thought content normal.         Judgment: Judgment normal.             Significant Labs: All pertinent labs within the past 24 hours have been reviewed.  CBC:   Recent Labs   Lab 12/14/24  0423 12/14/24  1644 12/15/24  0323   WBC 6.70 5.42 4.99   HGB 6.2* 8.0* 8.3*   HCT 20.3* 24.7* 25.6*    148* 160       Significant Imaging: I have reviewed all  pertinent imaging results/findings within the past 24 hours.    Assessment and Plan     * Recurrent epistaxis  - 84 y.o. M on chronic anticoagulation with warfarin and extensive history of recurrent epistaxis s/p bilateral SP ligation followed by bilateral embolization, cautery on the left septum x2 for persistent bleeding (11/2023 & 12/2023), left AEA ligation (2/2024) and repeat embolization in 08/2024 presenting with recurrent epistaxis   - Hgb 6.0 from baseline ~8.0   - Nasal clamp placed in the ED  - ENT consulted, appreciate recs:   - No acute ENT intervention at this time, okay for diet  - Transfuse PRN  - Okay for anti-coagulation  - Maintain bilateral merocel at least 3-5 days - will likely serial remove starting 12/16  - Please place patient on anti-staph antibiotics while packing is in place  - May place mustache dressing under nose for trickling, ok if dressing becomes saturated with some red/brown drainage  - Nasal saline q4h while packing in place, even on side with nasal packing. Can continue nasal saline sprays to keep mucosa moist to prevent future bleeding.  - Keep head of bed elevated  - Apply Afrin to affected nasal cavity if epistaxis recurs, please see detailed instructions below   - Please call ENT with questions    Coronary artery disease involving native coronary artery of native heart without angina pectoris  Patient with known CAD s/p stent placement and CABG, which is controlled Will continue Statin and monitor for S/Sx of angina/ACS. Continue to monitor on telemetry.     JIM (acute kidney injury)  Now back at baseline CKD IV  JIM is likely due to pre-renal azotemia due to intravascular volume depletion. Baseline creatinine is  2.2-2.4 . Most recent creatinine and eGFR are listed below.  Recent Labs     12/13/24  1252 12/14/24  0423 12/15/24  0323   CREATININE 2.6* 2.3* 2.6*   EGFRNORACEVR 23.7* 27.5* 23.7*     Plan  - JIM is stable  - Avoid nephrotoxins and renally dose meds for GFR  listed above  - Monitor urine output, serial BMP, and adjust therapy as needed  - s/p IVFs in the ED, encourage PO hydration given IVF shortage     Acute blood loss anemia  Anemia is likely due to acute blood loss which was from epistaxis and Iron deficiency. Most recent hemoglobin and hematocrit are listed below.  Recent Labs     12/14/24  0423 12/14/24  1644 12/15/24  0323   HGB 6.2* 8.0* 8.3*   HCT 20.3* 24.7* 25.6*     Plan  - Monitor serial CBC: Daily  - Transfuse PRBC if patient becomes hemodynamically unstable, symptomatic or H/H drops below 7/21.  - Patient received 1 unit of PRBCs in the ED on 12/13, addition unit ordered on 12/14  - Patient's anemia is currently stable  - See epistaxis     Acidosis  - CO2 20 on BMP  - Likely 2/2 active bleeding  - Continue sodium bicarb supplementation for now  - Continue to monitor on daily labs     H/O mechanical aortic valve replacement  Chronic anticoagulation  - PT/INR 24/2.4 on admission, now 21/2.0  - Discussed with pharmacy - plan to start therapeutic lovenox today with plan to restart warfarin in the am if hemostasis remains on therapeutic lovenox  - PT/INR daily     Type 2 diabetes mellitus with stage 4 chronic kidney disease, without long-term current use of insulin  - Patient's FSGs are controlled on current hypoglycemics.   - Last A1c reviewed-   Lab Results   Component Value Date    HGBA1C 5.1 12/13/2024     - Most recent fingerstick glucose reviewed-   Recent Labs   Lab 12/14/24  1133 12/14/24  1531 12/14/24  2126 12/15/24  0739   POCTGLUCOSE 81 176* 109 73       - Current correctional scale Low  Maintain anti-hyperglycemic dose as follows-   Antihyperglycemics (From admission, onward)      Start     Stop Route Frequency Ordered    12/13/24 1616  insulin aspart U-100 pen 0-5 Units         -- SubQ Before meals & nightly PRN 12/13/24 1517        - Diabetic diet when not NPO    Renovascular hypertension  Hypertension associated with diabetes  - Latest BP and  vitals reviewed  - Continue home meds for HTN:   Hypertension Medications               bumetanide (BUMEX) 1 MG tablet Take 2 tablets (2 mg total) by mouth every other day.    isosorbide mononitrate (IMDUR) 60 MG 24 hr tablet Take 1 tablet (60 mg total) by mouth every evening.   - Goal SBP 120s-140s. Utilize p.r.n. antihypertensives only if patient's BP >180/110 & develop symptoms of worsening CP or SOB.    Hyperlipidemia associated with type 2 diabetes mellitus  - Continue statin       VTE Risk Mitigation (From admission, onward)           Ordered     enoxaparin injection 70 mg  Every 12 hours         12/15/24 0757     IP VTE HIGH RISK PATIENT  Once         12/13/24 1517     Place sequential compression device  Until discontinued         12/13/24 1517                    Discharge Planning   ASTER: 12/16/2024     Code Status: Full Code   Medical Readiness for Discharge Date:                            Jenn Frye PA-C  Department of Hospital Medicine   [facility]

## 2024-12-15 NOTE — ASSESSMENT & PLAN NOTE
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 Baseline creatinine is  2.2-2.4 . Most recent creatinine and eGFR are listed below.  Recent Labs     12/13/24  1252 12/14/24  0423 12/15/24  0323   CREATININE 2.6* 2.3* 2.6*   EGFRNORACEVR 23.7* 27.5* 23.7*     Plan  - JIM is stable  - Avoid nephrotoxins and renally dose meds for GFR listed above  - Monitor urine output, serial BMP, and adjust therapy as needed  - s/p IVFs in the ED, encourage PO hydration given IVF shortage

## 2024-12-15 NOTE — SUBJECTIVE & OBJECTIVE
Interval History: No bleeding overnight. Hgb stable this AM at 8.3.    Medications:  Continuous Infusions:  Scheduled Meds:   allopurinoL  100 mg Oral Daily    atorvastatin  80 mg Oral Daily    bumetanide  2 mg Oral Every other day    doxycycline  100 mg Oral Q12H    enoxparin  1 mg/kg Subcutaneous Q12H (treatment, non-standard time)    ferrous gluconate  324 mg Oral Daily with breakfast    isosorbide mononitrate  60 mg Oral QHS    mupirocin   Topical (Top) Daily    oxymetazoline  2 spray Each Nostril Q8H    sodium bicarbonate  650 mg Oral BID    sodium chloride  1 spray Each Nostril Q4H     PRN Meds:  Current Facility-Administered Medications:     0.9%  NaCl infusion (for blood administration), , Intravenous, Q24H PRN    0.9%  NaCl infusion (for blood administration), , Intravenous, Q24H PRN    acetaminophen, 1,000 mg, Oral, Q6H PRN    aluminum-magnesium hydroxide-simethicone, 30 mL, Oral, QID PRN    bisacodyL, 10 mg, Rectal, Daily PRN    dextrose 10%, 12.5 g, Intravenous, PRN    dextrose 10%, 25 g, Intravenous, PRN    glucagon (human recombinant), 1 mg, Intramuscular, PRN    glucose, 16 g, Oral, PRN    glucose, 24 g, Oral, PRN    insulin aspart U-100, 0-5 Units, Subcutaneous, QID (AC + HS) PRN    naloxone, 0.02 mg, Intravenous, PRN    ondansetron, 8 mg, Oral, Q8H PRN    polyethylene glycol, 17 g, Oral, BID PRN    prochlorperazine, 5 mg, Intravenous, Q6H PRN    simethicone, 1 tablet, Oral, QID PRN    sodium chloride 0.9%, 10 mL, Intravenous, Q12H PRN    tranexamic acid, 500 mg, Nebulization, PRN    traZODone, 50 mg, Oral, Nightly PRN     Review of patient's allergies indicates:   Allergen Reactions    Lisinopril     Losartan      Intolerance- elevates potassium level     Objective:     Vital Signs (24h Range):  Temp:  [97.6 °F (36.4 °C)-98.4 °F (36.9 °C)] 98 °F (36.7 °C)  Pulse:  [52-59] 58  Resp:  [17-20] 17  SpO2:  [95 %-100 %] 97 %  BP: (115-163)/(53-63) 118/53       Lines/Drains/Airways       Peripheral  Intravenous Line  Duration                  Peripheral IV - Single Lumen 12/13/24 1253 18 G Left Antecubital 1 day         Peripheral IV - Single Lumen 12/13/24 1430 20 G Distal;Left;Posterior Forearm 1 day                     Physical Exam  NAD  Head atraumatic   EOMI   Auricles WNL AU  Bilateral nares with dry mucosa, septal perforation anteriorly (~1.5cm), bilateral merocel packing in place with fibrillar around packing. No active bleeding.  Clot in posterior oropharynx, no trickling  Neck soft  Unlabored breathing, no stridor       Significant Labs:  CBC:   Recent Labs   Lab 12/15/24  0323   WBC 4.99   RBC 2.66*   HGB 8.3*   HCT 25.6*      MCV 96   MCH 31.2*   MCHC 32.4     CMP:   Recent Labs   Lab 12/13/24  1252 12/14/24  0423 12/15/24  0323   *   < > 78   CALCIUM 9.6   < > 9.6   ALBUMIN 3.1*  --   --    PROT 6.6  --   --       < > 138   K 4.5   < > 3.8   CO2 20*   < > 20*      < > 109   BUN 58*   < > 58*   CREATININE 2.6*   < > 2.6*   ALKPHOS 64  --   --    ALT 11  --   --    AST 15  --   --    BILITOT 0.4  --   --     < > = values in this interval not displayed.       Significant Diagnostics:  None

## 2024-12-15 NOTE — ASSESSMENT & PLAN NOTE
Anemia is likely due to acute blood loss which was from epistaxis and Iron deficiency. Most recent hemoglobin and hematocrit are listed below.  Recent Labs     12/14/24  0423 12/14/24  1644 12/15/24  0323   HGB 6.2* 8.0* 8.3*   HCT 20.3* 24.7* 25.6*     Plan  - Monitor serial CBC: Daily  - Transfuse PRBC if patient becomes hemodynamically unstable, symptomatic or H/H drops below 7/21.  - Patient received 1 unit of PRBCs in the ED on 12/13, addition unit ordered on 12/14  - Patient's anemia is currently stable  - See epistaxis

## 2024-12-15 NOTE — PLAN OF CARE
Problem: Adult Inpatient Plan of Care  Goal: Plan of Care Review  Outcome: Progressing  Goal: Patient-Specific Goal (Individualized)  Outcome: Progressing  Goal: Absence of Hospital-Acquired Illness or Injury  Outcome: Progressing  Goal: Optimal Comfort and Wellbeing  Outcome: Progressing  Goal: Readiness for Transition of Care  Outcome: Progressing     Problem: Infection  Goal: Absence of Infection Signs and Symptoms  Outcome: Progressing     Problem: Fall Injury Risk  Goal: Absence of Fall and Fall-Related Injury  Outcome: Progressing     Problem: Diabetes Comorbidity  Goal: Blood Glucose Level Within Targeted Range  Outcome: Progressing     Problem: Acute Kidney Injury/Impairment  Goal: Fluid and Electrolyte Balance  Outcome: Progressing  Goal: Improved Oral Intake  Outcome: Progressing  Goal: Effective Renal Function  Outcome: Progressing

## 2024-12-15 NOTE — ASSESSMENT & PLAN NOTE
85 yo male with heart valve on [place] and recurrent epistaxis presents to ER due to severe epistaxis. Currently packed with bilateral merocels. Hgb 6. Getting transfused with pRBC and will be admitted to medicine for  close monitoring.    - No acute ENT intervention at this time, okay for diet  - Transfuse PRN  - Okay for anti-coagulation  - Maintain bilateral merocel at least 3-5 days - will likely serial remove starting 12/16  - Please place patient on anti-staph antibiotics while packing is in place  - May place mustache dressing under nose for trickling, ok if dressing becomes saturated with some red/brown drainage  - Nasal saline q4h while packing in place, even on side with nasal packing. Can continue nasal saline sprays to keep mucosa moist to prevent future bleeding.  - Keep head of bed elevated  - Apply Afrin to affected nasal cavity if epistaxis recurs, please see detailed instructions below   - Please call ENT with questions          PATIENT INSTRUCTIONS:    Nasal precautions  - DO NOT blow your nose for 2 weeks. The only exception is that you may lightly blow your nose after using a sinus rinse.  - Sneeze/cough with mouth open  - Avoid irritating substances that might make you sneeze, such as dust, chalk, harsh chemicals, and allergic triggers. This might also include spicy foods.  - Do not smoke   - Avoid flying or swimming for 2 weeks.    - Avoid all heavy lifting, straining or bending for 2 weeks.   - Avoid semi-contact sports or vigorous exercising for 3-4 weeks.      Aftercare/ moisturizing recommendations to decrease frequency of nosebleeds:  - Daily nasal saline sprays/rinses  - Nightly application of vaseline/aquaphor to anterior nares  - Room humidification  - Avoidance of nasal cannula if possible (always with humidification and please use face tent, nasal cup, facemask if possible)  Management of medical conditions contributing to epistaxis (coagulopathy, hypertension, etc.)  - Humidification  of CPAP appliance if applicable    If rebleeding occurs:   - Apply Afrin liberally to both nostrils  - Apply pressure over the soft part of the nose for 15 minutes (clip or digital pressure, important to have pressure over soft part of nose and consistent pressure (do not release pressure at any point))  - Instruct patient to lean forward, avoid swallowing blood. This can cause nausea and vomiting  - Can repeat these steps above up to 3 times  - Call/reconsult ENT or return to the nearest emergency department if this is unsuccessful

## 2024-12-15 NOTE — ASSESSMENT & PLAN NOTE
- 84 y.o. M on chronic anticoagulation with warfarin and extensive history of recurrent epistaxis s/p bilateral SP ligation followed by bilateral embolization, cautery on the left septum x2 for persistent bleeding (11/2023 & 12/2023), left AEA ligation (2/2024) and repeat embolization in 08/2024 presenting with recurrent epistaxis   - Hgb 6.0 from baseline ~8.0   - Nasal clamp placed in the ED  - ENT consulted, appreciate recs:   - No acute ENT intervention at this time, okay for diet  - Transfuse PRN  - Okay for anti-coagulation  - Maintain bilateral merocel at least 3-5 days - will likely serial remove starting 12/16  - Please place patient on anti-staph antibiotics while packing is in place  - May place mustache dressing under nose for trickling, ok if dressing becomes saturated with some red/brown drainage  - Nasal saline q4h while packing in place, even on side with nasal packing. Can continue nasal saline sprays to keep mucosa moist to prevent future bleeding.  - Keep head of bed elevated  - Apply Afrin to affected nasal cavity if epistaxis recurs, please see detailed instructions below   - Please call ENT with questions

## 2024-12-16 LAB
ANION GAP SERPL CALC-SCNC: 8 MMOL/L (ref 8–16)
BASOPHILS # BLD AUTO: 0.03 K/UL (ref 0–0.2)
BASOPHILS NFR BLD: 0.5 % (ref 0–1.9)
BUN SERPL-MCNC: 51 MG/DL (ref 8–23)
CALCIUM SERPL-MCNC: 9.5 MG/DL (ref 8.7–10.5)
CHLORIDE SERPL-SCNC: 112 MMOL/L (ref 95–110)
CO2 SERPL-SCNC: 18 MMOL/L (ref 23–29)
CREAT SERPL-MCNC: 2.3 MG/DL (ref 0.5–1.4)
DIFFERENTIAL METHOD BLD: ABNORMAL
EOSINOPHIL # BLD AUTO: 0.2 K/UL (ref 0–0.5)
EOSINOPHIL NFR BLD: 4.1 % (ref 0–8)
ERYTHROCYTE [DISTWIDTH] IN BLOOD BY AUTOMATED COUNT: 16.4 % (ref 11.5–14.5)
ERYTHROCYTE [DISTWIDTH] IN BLOOD BY AUTOMATED COUNT: 16.5 % (ref 11.5–14.5)
EST. GFR  (NO RACE VARIABLE): 27.5 ML/MIN/1.73 M^2
GLUCOSE SERPL-MCNC: 78 MG/DL (ref 70–110)
HCT VFR BLD AUTO: 23.9 % (ref 40–54)
HCT VFR BLD AUTO: 26.6 % (ref 40–54)
HGB BLD-MCNC: 7.5 G/DL (ref 14–18)
HGB BLD-MCNC: 8.3 G/DL (ref 14–18)
IMM GRANULOCYTES # BLD AUTO: 0.02 K/UL (ref 0–0.04)
IMM GRANULOCYTES NFR BLD AUTO: 0.4 % (ref 0–0.5)
INR PPP: 1.6 (ref 0.8–1.2)
LYMPHOCYTES # BLD AUTO: 1.2 K/UL (ref 1–4.8)
LYMPHOCYTES NFR BLD: 20.4 % (ref 18–48)
MAGNESIUM SERPL-MCNC: 1.9 MG/DL (ref 1.6–2.6)
MCH RBC QN AUTO: 29.8 PG (ref 27–31)
MCH RBC QN AUTO: 29.9 PG (ref 27–31)
MCHC RBC AUTO-ENTMCNC: 31.2 G/DL (ref 32–36)
MCHC RBC AUTO-ENTMCNC: 31.4 G/DL (ref 32–36)
MCV RBC AUTO: 95 FL (ref 82–98)
MCV RBC AUTO: 96 FL (ref 82–98)
MONOCYTES # BLD AUTO: 0.7 K/UL (ref 0.3–1)
MONOCYTES NFR BLD: 12.1 % (ref 4–15)
NEUTROPHILS # BLD AUTO: 3.5 K/UL (ref 1.8–7.7)
NEUTROPHILS NFR BLD: 62.5 % (ref 38–73)
NRBC BLD-RTO: 0 /100 WBC
PHOSPHATE SERPL-MCNC: 3.3 MG/DL (ref 2.7–4.5)
PLATELET # BLD AUTO: 159 K/UL (ref 150–450)
PLATELET # BLD AUTO: 168 K/UL (ref 150–450)
PMV BLD AUTO: 10.4 FL (ref 9.2–12.9)
PMV BLD AUTO: 10.6 FL (ref 9.2–12.9)
POCT GLUCOSE: 103 MG/DL (ref 70–110)
POCT GLUCOSE: 104 MG/DL (ref 70–110)
POCT GLUCOSE: 107 MG/DL (ref 70–110)
POCT GLUCOSE: 127 MG/DL (ref 70–110)
POTASSIUM SERPL-SCNC: 4.1 MMOL/L (ref 3.5–5.1)
PROTHROMBIN TIME: 17.3 SEC (ref 9–12.5)
RBC # BLD AUTO: 2.52 M/UL (ref 4.6–6.2)
RBC # BLD AUTO: 2.78 M/UL (ref 4.6–6.2)
SODIUM SERPL-SCNC: 138 MMOL/L (ref 136–145)
WBC # BLD AUTO: 5.1 K/UL (ref 3.9–12.7)
WBC # BLD AUTO: 5.63 K/UL (ref 3.9–12.7)

## 2024-12-16 PROCEDURE — 63600175 PHARM REV CODE 636 W HCPCS: Mod: HCNC

## 2024-12-16 PROCEDURE — 25000003 PHARM REV CODE 250: Mod: HCNC

## 2024-12-16 PROCEDURE — 80048 BASIC METABOLIC PNL TOTAL CA: CPT | Mod: HCNC

## 2024-12-16 PROCEDURE — 84100 ASSAY OF PHOSPHORUS: CPT | Mod: HCNC

## 2024-12-16 PROCEDURE — 85027 COMPLETE CBC AUTOMATED: CPT | Mod: HCNC

## 2024-12-16 PROCEDURE — 36415 COLL VENOUS BLD VENIPUNCTURE: CPT | Mod: HCNC

## 2024-12-16 PROCEDURE — 85025 COMPLETE CBC W/AUTO DIFF WBC: CPT | Mod: HCNC

## 2024-12-16 PROCEDURE — 83735 ASSAY OF MAGNESIUM: CPT | Mod: HCNC

## 2024-12-16 PROCEDURE — 85610 PROTHROMBIN TIME: CPT | Mod: HCNC

## 2024-12-16 PROCEDURE — 21400001 HC TELEMETRY ROOM: Mod: HCNC

## 2024-12-16 RX ORDER — WARFARIN SODIUM 5 MG/1
5 TABLET ORAL
Status: DISCONTINUED | OUTPATIENT
Start: 2024-12-16 | End: 2024-12-17

## 2024-12-16 RX ORDER — WARFARIN 2.5 MG/1
2.5 TABLET ORAL
Status: DISCONTINUED | OUTPATIENT
Start: 2024-12-18 | End: 2024-12-18

## 2024-12-16 RX ORDER — ENOXAPARIN SODIUM 100 MG/ML
1 INJECTION SUBCUTANEOUS EVERY 24 HOURS
Status: DISCONTINUED | OUTPATIENT
Start: 2024-12-17 | End: 2024-12-18 | Stop reason: HOSPADM

## 2024-12-16 RX ADMIN — DOXYCYCLINE HYCLATE 100 MG: 100 TABLET, COATED ORAL at 09:12

## 2024-12-16 RX ADMIN — NASAL 1 SPRAY: 6.5 SPRAY NASAL at 02:12

## 2024-12-16 RX ADMIN — Medication 324 MG: at 08:12

## 2024-12-16 RX ADMIN — BUMETANIDE 2 MG: 1 TABLET ORAL at 08:12

## 2024-12-16 RX ADMIN — Medication 2 SPRAY: at 06:12

## 2024-12-16 RX ADMIN — DOXYCYCLINE HYCLATE 100 MG: 100 TABLET, COATED ORAL at 08:12

## 2024-12-16 RX ADMIN — NASAL 1 SPRAY: 6.5 SPRAY NASAL at 09:12

## 2024-12-16 RX ADMIN — NASAL 1 SPRAY: 6.5 SPRAY NASAL at 10:12

## 2024-12-16 RX ADMIN — NASAL 1 SPRAY: 6.5 SPRAY NASAL at 06:12

## 2024-12-16 RX ADMIN — ENOXAPARIN SODIUM 70 MG: 100 INJECTION SUBCUTANEOUS at 08:12

## 2024-12-16 RX ADMIN — ISOSORBIDE MONONITRATE 60 MG: 60 TABLET, EXTENDED RELEASE ORAL at 09:12

## 2024-12-16 RX ADMIN — SODIUM BICARBONATE 650 MG TABLET 650 MG: at 08:12

## 2024-12-16 RX ADMIN — NASAL 1 SPRAY: 6.5 SPRAY NASAL at 05:12

## 2024-12-16 RX ADMIN — WARFARIN SODIUM 5 MG: 5 TABLET ORAL at 05:12

## 2024-12-16 RX ADMIN — SODIUM BICARBONATE 650 MG TABLET 650 MG: at 09:12

## 2024-12-16 RX ADMIN — ALLOPURINOL 100 MG: 100 TABLET ORAL at 08:12

## 2024-12-16 RX ADMIN — ATORVASTATIN CALCIUM 80 MG: 40 TABLET, FILM COATED ORAL at 08:12

## 2024-12-16 RX ADMIN — MUPIROCIN: 20 OINTMENT TOPICAL at 08:12

## 2024-12-16 NOTE — ASSESSMENT & PLAN NOTE
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 Baseline creatinine is  2.2-2.4 . Most recent creatinine and eGFR are listed below.  Recent Labs     12/14/24  0423 12/15/24  0323 12/16/24  0511   CREATININE 2.3* 2.6* 2.3*   EGFRNORACEVR 27.5* 23.7* 27.5*     Plan  - JIM is stable  - Avoid nephrotoxins and renally dose meds for GFR listed above  - Monitor urine output, serial BMP, and adjust therapy as needed  - s/p IVFs in the ED, encourage PO hydration given IVF shortage

## 2024-12-16 NOTE — PLAN OF CARE
Problem: Adult Inpatient Plan of Care  Goal: Plan of Care Review  Outcome: Progressing     Problem: Fall Injury Risk  Goal: Absence of Fall and Fall-Related Injury  Outcome: Progressing     Problem: Acute Kidney Injury/Impairment  Goal: Fluid and Electrolyte Balance  Outcome: Progressing  Goal: Improved Oral Intake  Outcome: Progressing  Goal: Effective Renal Function  Outcome: Progressing     Problem: Acute Kidney Injury/Impairment  Goal: Effective Renal Function  Outcome: Progressing

## 2024-12-16 NOTE — PLAN OF CARE
[facility]  Initial Discharge Assessment       Primary Care Provider: Devon Langston Jr., MD    Admission Diagnosis: Recurrent epistaxis [R04.0]  Left-sided epistaxis [R04.0]  Chest pain [R07.9]  Anemia, unspecified type [D64.9]    Admission Date: 12/13/2024  Expected Discharge Date: 12/17/2024         Payor: HU[employer] MANAGED MEDICARE / Plan: HU[employer] MEDICARE HMO / Product Type: Capitation /     Extended Emergency Contact Information  Primary Emergency Contact: Ameena Urbina  Address: [address] [place], [place] [address] [address] [facility]  Home Phone: [phone]  Mobile Phone: [phone]  Relation: Spouse    Discharge Plan A: (P) Home with family  Discharge Plan B: (P) Home with family      [facility] - [place], [place] - [address]  [address] [address]  Phone: [phone] Fax: [phone]    [facility]  [address] [address]  Phone: [phone] Fax: [phone]    [id number] - [place], [place] - [address]  [address]  [place] [address]  Phone: [phone] Fax: [phone]      Initial Assessment (most recent)       Adult Discharge Assessment - 12/16/24 1232          Discharge Assessment    Assessment Type Discharge Planning Assessment (P)      Confirmed/corrected address, phone number and insurance Yes (P)      Confirmed Demographics Correct on Facesheet (P)      Source of Information patient (P)      Reason For Admission Recurrent epistaxis (P)      People in Home spouse (P)      Do you expect to return to your current living situation? Yes (P)      Prior to hospitilization cognitive status: Alert/Oriented (P)      Current cognitive status: Alert/Oriented (P)      Equipment Currently Used at Home none (P)      Readmission within 30 days? No (P)      Patient currently being followed by outpatient case management? No (P)    Referred to [facility] 12/16    Do you take prescription  medications? Yes (P)      Do you have prescription coverage? Yes (P)      Do you have any problems affording any of your prescribed medications? TBD (P)      Are you on dialysis? No (P)      Do you take coumadin? Yes (P)      Who monitors your labs? [facility] (P)      Discharge Plan A Home with family (P)      Discharge Plan B Home with family (P)                           SW completed Discharge Planning Assessment with patient via bedside. Discharge planning booklet given to patient/family and whiteboard updated with ASTER and phone #. All questions answered.    Patient reported that his wife will provide transportation upon discharge.     Patient reported that he live with his wife, and prior to hospitalization he was independent with his ADL's. Patient reported that he is not on dialysis. Patient reported that he is on Coumadin and is monitored by [facility].      Patient lives in a [facility] with 0 steps to enter.     Discharge Plan A and Plan B have been determined by review of patient's clinical status, future medical and therapeutic needs, and coverage/benefits for post-acute care in coordination with multidisciplinary team members.      Payton Valera LMSW  [facility]  Ext. [phone]

## 2024-12-16 NOTE — ASSESSMENT & PLAN NOTE
85 yo male with heart valve on Wafarin and recurrent epistaxis presents to ER due to severe epistaxis. Currently packed with bilateral merocels. Hgb 6. Getting transfused with pRBC and will be admitted to medicine for  close monitoring.    Today pulled right merocel (placed given perforation) with Hgb noted to slightly dip to 7.6 from 8.3 though no active epistaxis.   Will likely pull left merocel this afternoon v tomorrow     - No acute ENT intervention at this time, okay for diet  - Transfuse PRN   - Okay for anti-coagulation  - Maintain merocel at least 3-5 days -serial remove started 12/16 w right pack out, left remains in place    - may have minor drainage on right given large anterior perforation in septum  - Please place patient on anti-staph antibiotics while packing is in place  - May place mustache dressing under nose for trickling, ok if dressing becomes saturated with some red/brown drainage  - Nasal saline q4h while packing in place, even on side with nasal packing. Can continue nasal saline sprays to keep mucosa moist to prevent future bleeding.  - Keep head of bed elevated  - Apply Afrin to affected nasal cavity if epistaxis recurs, please see detailed instructions below   - Please call ENT with questions          PATIENT INSTRUCTIONS:    Nasal precautions  - DO NOT blow your nose for 2 weeks. The only exception is that you may lightly blow your nose after using a sinus rinse.  - Sneeze/cough with mouth open  - Avoid irritating substances that might make you sneeze, such as dust, chalk, harsh chemicals, and allergic triggers. This might also include spicy foods.  - Do not smoke   - Avoid flying or swimming for 2 weeks.    - Avoid all heavy lifting, straining or bending for 2 weeks.   - Avoid semi-contact sports or vigorous exercising for 3-4 weeks.      Aftercare/ moisturizing recommendations to decrease frequency of nosebleeds:  - Daily nasal saline sprays/rinses  - Nightly application of  vaseline/aquaphor to anterior nares  - Room humidification  - Avoidance of nasal cannula if possible (always with humidification and please use face tent, nasal cup, facemask if possible)  Management of medical conditions contributing to epistaxis (coagulopathy, hypertension, etc.)  - Humidification of CPAP appliance if applicable    If rebleeding occurs:   - Apply Afrin liberally to both nostrils  - Apply pressure over the soft part of the nose for 15 minutes (clip or digital pressure, important to have pressure over soft part of nose and consistent pressure (do not release pressure at any point))  - Instruct patient to lean forward, avoid swallowing blood. This can cause nausea and vomiting  - Can repeat these steps above up to 3 times  - Call/reconsult ENT or return to the nearest emergency department if this is unsuccessful

## 2024-12-16 NOTE — PROGRESS NOTES
[facility] [address] [place] [address] Medicine  Progress Note    Patient Name: Dariel Urbina  MRN: [id number]  Patient Class: IP- Inpatient   Admission Date: 12/13/2024  Length of Stay: 2 days  Attending Physician: Cody Carrington MD  Primary Care Provider: Devon Langston Jr., MD    Principal Problem:Recurrent epistaxis    HPI:  Dariel Urbina is a 84 y.o. M with HTN, HLD, combined CHF, paroxysmal A fib, CAD s/p CABG and stent placement,  h/o mechanical aortic valve replacement on warfarin, T2DM, stage IV CKD, gout, and recurrent epistaxis s/p bilateral SP ligation followed by bilateral embolization, cautery on the left septum x2 for persistent bleeding (11/2023 & 12/2023), left AEA ligation (2/2024) and repeat embolization in 08/2024 presenting with recurrent epistaxis. The patient reports his most recent episode of epistaxis started ~3-4 days ago and has been intermittent with persistent worsening and did not stop with persistent pressure and afrin. Pt endorses associated dizziness, lightheadedness, palpitations, and SOB but otherwise denies fever, chills, chest pain, abdominal pain, N/V/D, dysuria, leg swelling or pain, HA, or syncope.       In the ED: Hypertensive to 170s/70s, o/w VSSAF. CBC with Hgb 6.0 (from baseline ~8), no leukocytosis. PT 24/INR 2.4. CMP with Cr 2.6 (from baseline 2.2-2.4). ED provider attempted nasal packing without significant improvement. The patient was given 1u pRBCs, afrin, and NS bolus. ENT consulted & patient was placed in observation for further management.     Overview/Hospital Course:  Mr. Urbina was admitted to hospital medicine for recurrent epistaxis and symptomatic anemia. Hgb 6.0 (from baseline of ~8) on admission, which improved to 8.1 s/p 1U pRBCs but unfortunately dropped back down to 6.2 shortly after. Additional 1U pRBCs ordered on 12/14 with improvement in Hgb to 8.3. ENT consulted and placed bilateral merocel packing with fibrillar around the packing.  Anti-staph antibiotics in place. IR consulted for consideration of inpatient embolization, which was deferred given hemostasis with packing in place. Therapeutic lovenox initiated on 12/15 with plan to restart home coumadin dosing and trial serial packing removal on 12/16.     Interval History: Pt seen and examined by me this morning. NAMRATAÁngela PALLAVI. Pt denies evidence of re-bleeding in the last 24 hrs though hgb did drop from 8.3 -> 7.5. He reports interval improvement in dizziness, fatigue, SOB, and palpitations.     Review of Systems   Constitutional:  Positive for activity change and fatigue. Negative for chills and fever.   HENT:  Negative for trouble swallowing.    Eyes:  Negative for photophobia and visual disturbance.   Respiratory:  Positive for shortness of breath. Negative for chest tightness and wheezing.    Cardiovascular:  Positive for palpitations. Negative for chest pain and leg swelling.   Gastrointestinal:  Negative for abdominal pain, constipation, diarrhea, nausea and vomiting.   Genitourinary:  Negative for dysuria, frequency, hematuria and urgency.   Musculoskeletal:  Negative for arthralgias, back pain and gait problem.   Skin:  Negative for color change and rash.   Neurological:  Positive for dizziness and light-headedness. Negative for syncope, weakness, numbness and headaches.   Psychiatric/Behavioral:  Negative for agitation and confusion. The patient is not nervous/anxious.      Objective:     Vital Signs (Most Recent):  Temp: 97.8 °F (36.6 °C) (12/16/24 0800)  Pulse: (!) 52 (12/16/24 0758)  Resp: 20 (12/16/24 0758)  BP: (!) 160/94 (12/16/24 0758)  SpO2: 95 % (12/16/24 0758) Vital Signs (24h Range):  Temp:  [97 °F (36.1 °C)-97.8 °F (36.6 °C)] 97.8 °F (36.6 °C)  Pulse:  [52-67] 52  Resp:  [15-20] 20  SpO2:  [95 %-99 %] 95 %  BP: (135-160)/(63-94) 160/94     Weight: 67.6 kg (149 lb 0.5 oz)  Body mass index is 24.05 kg/m².    Intake/Output Summary (Last 24 hours) at 12/16/2024 1044  Last data  filed at 12/15/2024 8382  Gross per 24 hour   Intake 320 ml   Output 300 ml   Net 20 ml           Physical Exam  Vitals and nursing note reviewed.   Constitutional:       General: He is not in acute distress.     Appearance: He is well-developed. He is ill-appearing.   HENT:      Head: Normocephalic and atraumatic.      Nose:      Comments: L merocel packing in place with fibrillar around packing. No active bleeding from his packing.      Mouth/Throat:      Mouth: Mucous membranes are moist.   Eyes:      Extraocular Movements: Extraocular movements intact.      Conjunctiva/sclera: Conjunctivae normal.   Cardiovascular:      Rate and Rhythm: Normal rate and regular rhythm.      Heart sounds: Normal heart sounds.   Pulmonary:      Effort: Pulmonary effort is normal. No respiratory distress.      Breath sounds: Normal breath sounds. No wheezing.   Abdominal:      General: Bowel sounds are normal. There is no distension.      Palpations: Abdomen is soft.      Tenderness: There is no abdominal tenderness.   Musculoskeletal:         General: No tenderness. Normal range of motion.      Cervical back: Normal range of motion and neck supple.   Skin:     General: Skin is warm and dry.      Capillary Refill: Capillary refill takes less than 2 seconds.      Coloration: Skin is pale (improving).      Findings: No rash.   Neurological:      Mental Status: He is alert and oriented to person, place, and time.      Cranial Nerves: No cranial nerve deficit.      Sensory: No sensory deficit.      Coordination: Coordination normal.   Psychiatric:         Behavior: Behavior normal.         Thought Content: Thought content normal.         Judgment: Judgment normal.             Significant Labs: All pertinent labs within the past 24 hours have been reviewed.  CBC:   Recent Labs   Lab 12/14/24  1644 12/15/24  0323 12/16/24  0511   WBC 5.42 4.99 5.10   HGB 8.0* 8.3* 7.5*   HCT 24.7* 25.6* 23.9*   * 160 159       Significant Imaging:  I have reviewed all pertinent imaging results/findings within the past 24 hours.    Assessment and Plan     * Recurrent epistaxis  - 84 y.o. M on chronic anticoagulation with warfarin and extensive history of recurrent epistaxis s/p bilateral SP ligation followed by bilateral embolization, cautery on the left septum x2 for persistent bleeding (11/2023 & 12/2023), left AEA ligation (2/2024) and repeat embolization in 08/2024 presenting with recurrent epistaxis   - Hgb 6.0 from baseline ~8.0   - Nasal clamp placed in the ED  - ENT consulted, appreciate recs:   Today pulled right merocel (placed given perforation) with Hgb noted to slightly dip to 7.6 from 8.3 though no active epistaxis.   Will likely pull left merocel this afternoon v tomorrow      - No acute ENT intervention at this time, okay for diet  - Transfuse PRN   - Okay for anti-coagulation  - Maintain merocel at least 3-5 days -serial remove started 12/16 w right pack out, left remains in place          - may have minor drainage on right given large anterior perforation in septum  - Please place patient on anti-staph antibiotics while packing is in place  - May place mustache dressing under nose for trickling, ok if dressing becomes saturated with some red/brown drainage  - Nasal saline q4h while packing in place, even on side with nasal packing. Can continue nasal saline sprays to keep mucosa moist to prevent future bleeding.  - Keep head of bed elevated  - Apply Afrin to affected nasal cavity if epistaxis recurs, please see detailed instructions below   - Please call ENT with questions    Coronary artery disease involving native coronary artery of native heart without angina pectoris  Patient with known CAD s/p stent placement and CABG, which is controlled Will continue Statin and monitor for S/Sx of angina/ACS. Continue to monitor on telemetry.     JIM (acute kidney injury)  Now back at baseline CKD IV  JIM is likely due to pre-renal azotemia due to  intravascular volume depletion. Baseline creatinine is  2.2-2.4 . Most recent creatinine and eGFR are listed below.  Recent Labs     12/14/24  0423 12/15/24  0323 12/16/24  0511   CREATININE 2.3* 2.6* 2.3*   EGFRNORACEVR 27.5* 23.7* 27.5*     Plan  - JIM is stable  - Avoid nephrotoxins and renally dose meds for GFR listed above  - Monitor urine output, serial BMP, and adjust therapy as needed  - s/p IVFs in the ED, encourage PO hydration given IVF shortage     Acute blood loss anemia  Anemia is likely due to acute blood loss which was from epistaxis and Iron deficiency. Most recent hemoglobin and hematocrit are listed below.  Recent Labs     12/14/24  1644 12/15/24  0323 12/16/24  0511   HGB 8.0* 8.3* 7.5*   HCT 24.7* 25.6* 23.9*     Plan  - Monitor serial CBC: Daily  - Transfuse PRBC if patient becomes hemodynamically unstable, symptomatic or H/H drops below 7/21.  - Patient received 1 unit of PRBCs in the ED on 12/13, addition unit ordered on 12/14  - Patient's anemia is currently stable  - See epistaxis     Acidosis  - CO2 20 on BMP  - Likely 2/2 active bleeding  - Continue sodium bicarb supplementation for now  - Continue to monitor on daily labs     H/O mechanical aortic valve replacement  Chronic anticoagulation  - PT/INR 24/2.4 on admission, now 21/2.0  - Discussed with pharmacy - started therapeutic lovenox on 12/15 with plan to restart warfarin today  - PharmD consulted, appreciate recs  - PT/INR daily     Type 2 diabetes mellitus with stage 4 chronic kidney disease, without long-term current use of insulin  - Patient's FSGs are controlled on current hypoglycemics.   - Last A1c reviewed-   Lab Results   Component Value Date    HGBA1C 5.1 12/13/2024     - Most recent fingerstick glucose reviewed-   Recent Labs   Lab 12/15/24  1127 12/15/24  1614 12/15/24  2025 12/16/24  0756   POCTGLUCOSE 127* 130* 145* 107       - Current correctional scale Low  Maintain anti-hyperglycemic dose as follows-    Antihyperglycemics (From admission, onward)      Start     Stop Route Frequency Ordered    12/13/24 1616  insulin aspart U-100 pen 0-5 Units         -- SubQ Before meals & nightly PRN 12/13/24 1517        - Diabetic diet when not NPO    Renovascular hypertension  Hypertension associated with diabetes  - Latest BP and vitals reviewed  - Continue home meds for HTN:   Hypertension Medications               bumetanide (BUMEX) 1 MG tablet Take 2 tablets (2 mg total) by mouth every other day.    isosorbide mononitrate (IMDUR) 60 MG 24 hr tablet Take 1 tablet (60 mg total) by mouth every evening.   - Goal SBP 120s-140s. Utilize p.r.n. antihypertensives only if patient's BP >180/110 & develop symptoms of worsening CP or SOB.    Hyperlipidemia associated with type 2 diabetes mellitus  - Continue statin       VTE Risk Mitigation (From admission, onward)           Ordered     enoxaparin injection 70 mg  Every 24 hours         12/16/24 0918     IP VTE HIGH RISK PATIENT  Once         12/13/24 1517     Place sequential compression device  Until discontinued         12/13/24 1517                    Discharge Planning   ASTER: 12/17/2024     Code Status: Full Code   Medical Readiness for Discharge Date:                            Jenn Frye PA-C  Department of Hospital Medicine   [facility]

## 2024-12-16 NOTE — ASSESSMENT & PLAN NOTE
Chronic anticoagulation  - PT/INR 24/2.4 on admission, now 21/2.0  - Discussed with pharmacy - started therapeutic lovenox on 12/15 with plan to restart warfarin today  - PharmD consulted, appreciate recs  - PT/INR daily

## 2024-12-16 NOTE — ASSESSMENT & PLAN NOTE
- Patient's FSGs are controlled on current hypoglycemics.   - Last A1c reviewed-   Lab Results   Component Value Date    HGBA1C 5.1 12/13/2024     - Most recent fingerstick glucose reviewed-   Recent Labs   Lab 12/15/24  1127 12/15/24  1614 12/15/24  2025 12/16/24  0756   POCTGLUCOSE 127* 130* 145* 107       - Current correctional scale Low  Maintain anti-hyperglycemic dose as follows-   Antihyperglycemics (From admission, onward)    Start     Stop Route Frequency Ordered    12/13/24 1616  insulin aspart U-100 pen 0-5 Units         -- SubQ Before meals & nightly PRN 12/13/24 1517      - Diabetic diet when not NPO

## 2024-12-16 NOTE — SUBJECTIVE & OBJECTIVE
Interval History: No complaints of bleeding or posterior drip overnight.    Medications:  Continuous Infusions:  Scheduled Meds:   allopurinoL  100 mg Oral Daily    atorvastatin  80 mg Oral Daily    bumetanide  2 mg Oral Every other day    doxycycline  100 mg Oral Q12H    enoxparin  1 mg/kg Subcutaneous Q12H (treatment, non-standard time)    ferrous gluconate  324 mg Oral Daily with breakfast    isosorbide mononitrate  60 mg Oral QHS    mupirocin   Topical (Top) Daily    sodium bicarbonate  650 mg Oral BID    sodium chloride  1 spray Each Nostril Q4H     PRN Meds:  Current Facility-Administered Medications:     0.9%  NaCl infusion (for blood administration), , Intravenous, Q24H PRN    0.9%  NaCl infusion (for blood administration), , Intravenous, Q24H PRN    acetaminophen, 1,000 mg, Oral, Q6H PRN    aluminum-magnesium hydroxide-simethicone, 30 mL, Oral, QID PRN    bisacodyL, 10 mg, Rectal, Daily PRN    dextrose 10%, 12.5 g, Intravenous, PRN    dextrose 10%, 25 g, Intravenous, PRN    glucagon (human recombinant), 1 mg, Intramuscular, PRN    glucose, 16 g, Oral, PRN    glucose, 24 g, Oral, PRN    insulin aspart U-100, 0-5 Units, Subcutaneous, QID (AC + HS) PRN    naloxone, 0.02 mg, Intravenous, PRN    ondansetron, 8 mg, Oral, Q8H PRN    polyethylene glycol, 17 g, Oral, BID PRN    prochlorperazine, 5 mg, Intravenous, Q6H PRN    simethicone, 1 tablet, Oral, QID PRN    sodium chloride 0.9%, 10 mL, Intravenous, Q12H PRN    tranexamic acid, 500 mg, Nebulization, PRN    traZODone, 50 mg, Oral, Nightly PRN     Review of patient's allergies indicates:   Allergen Reactions    Lisinopril     Losartan      Intolerance- elevates potassium level     Objective:     Vital Signs (24h Range):  Temp:  [97 °F (36.1 °C)-98 °F (36.7 °C)] 97.5 °F (36.4 °C)  Pulse:  [52-67] 67  Resp:  [15-18] 18  SpO2:  [95 %-99 %] 99 %  BP: (118-144)/(53-70) 141/63       Lines/Drains/Airways       Peripheral Intravenous Line  Duration                   Peripheral IV - Single Lumen 12/13/24 1253 18 G Left Antecubital 2 days         Peripheral IV - Single Lumen 12/13/24 1430 20 G Distal;Left;Posterior Forearm 2 days                     Physical Exam  Bilateral merocels with right merocel pulled this AM without epistaxis   Posterior oropharynx clear w/o fresh blood post packing pull   Resting in bed   No inc WOB, no stridor     Significant Labs:  CBC:   Recent Labs   Lab 12/16/24  0511   WBC 5.10   RBC 2.52*   HGB 7.5*   HCT 23.9*      MCV 95   MCH 29.8   MCHC 31.4*     Coagulation:   Recent Labs   Lab 12/15/24  0323   LABPROT 21.4*   INR 2.0*       Significant Diagnostics:  None

## 2024-12-16 NOTE — ASSESSMENT & PLAN NOTE
Anemia is likely due to acute blood loss which was from epistaxis and Iron deficiency. Most recent hemoglobin and hematocrit are listed below.  Recent Labs     12/14/24  1644 12/15/24  0323 12/16/24  0511   HGB 8.0* 8.3* 7.5*   HCT 24.7* 25.6* 23.9*     Plan  - Monitor serial CBC: Daily  - Transfuse PRBC if patient becomes hemodynamically unstable, symptomatic or H/H drops below 7/21.  - Patient received 1 unit of PRBCs in the ED on 12/13, addition unit ordered on 12/14  - Patient's anemia is currently stable  - See epistaxis

## 2024-12-16 NOTE — ASSESSMENT & PLAN NOTE
- 84 y.o. M on chronic anticoagulation with warfarin and extensive history of recurrent epistaxis s/p bilateral SP ligation followed by bilateral embolization, cautery on the left septum x2 for persistent bleeding (11/2023 & 12/2023), left AEA ligation (2/2024) and repeat embolization in 08/2024 presenting with recurrent epistaxis   - Hgb 6.0 from baseline ~8.0   - Nasal clamp placed in the ED  - ENT consulted, appreciate recs:   Today pulled right merocel (placed given perforation) with Hgb noted to slightly dip to 7.6 from 8.3 though no active epistaxis.   Will likely pull left merocel this afternoon v tomorrow      - No acute ENT intervention at this time, okay for diet  - Transfuse PRN   - Okay for anti-coagulation  - Maintain merocel at least 3-5 days -serial remove started 12/16 w right pack out, left remains in place          - may have minor drainage on right given large anterior perforation in septum  - Please place patient on anti-staph antibiotics while packing is in place  - May place mustache dressing under nose for trickling, ok if dressing becomes saturated with some red/brown drainage  - Nasal saline q4h while packing in place, even on side with nasal packing. Can continue nasal saline sprays to keep mucosa moist to prevent future bleeding.  - Keep head of bed elevated  - Apply Afrin to affected nasal cavity if epistaxis recurs, please see detailed instructions below   - Please call ENT with questions

## 2024-12-16 NOTE — PROGRESS NOTES
[facility]  Otorhinolaryngology-Head & Neck Surgery  Progress Note    Subjective:     Post-Op Info:  * No surgery found *      Hospital Day: 4     Interval History: No complaints of bleeding or posterior drip overnight.    Medications:  Continuous Infusions:  Scheduled Meds:   allopurinoL  100 mg Oral Daily    atorvastatin  80 mg Oral Daily    bumetanide  2 mg Oral Every other day    doxycycline  100 mg Oral Q12H    enoxparin  1 mg/kg Subcutaneous Q12H (treatment, non-standard time)    ferrous gluconate  324 mg Oral Daily with breakfast    isosorbide mononitrate  60 mg Oral QHS    mupirocin   Topical (Top) Daily    sodium bicarbonate  650 mg Oral BID    sodium chloride  1 spray Each Nostril Q4H     PRN Meds:  Current Facility-Administered Medications:     0.9%  NaCl infusion (for blood administration), , Intravenous, Q24H PRN    0.9%  NaCl infusion (for blood administration), , Intravenous, Q24H PRN    acetaminophen, 1,000 mg, Oral, Q6H PRN    aluminum-magnesium hydroxide-simethicone, 30 mL, Oral, QID PRN    bisacodyL, 10 mg, Rectal, Daily PRN    dextrose 10%, 12.5 g, Intravenous, PRN    dextrose 10%, 25 g, Intravenous, PRN    glucagon (human recombinant), 1 mg, Intramuscular, PRN    glucose, 16 g, Oral, PRN    glucose, 24 g, Oral, PRN    insulin aspart U-100, 0-5 Units, Subcutaneous, QID (AC + HS) PRN    naloxone, 0.02 mg, Intravenous, PRN    ondansetron, 8 mg, Oral, Q8H PRN    polyethylene glycol, 17 g, Oral, BID PRN    prochlorperazine, 5 mg, Intravenous, Q6H PRN    simethicone, 1 tablet, Oral, QID PRN    sodium chloride 0.9%, 10 mL, Intravenous, Q12H PRN    tranexamic acid, 500 mg, Nebulization, PRN    traZODone, 50 mg, Oral, Nightly PRN     Review of patient's allergies indicates:   Allergen Reactions    Lisinopril     Losartan      Intolerance- elevates potassium level     Objective:     Vital Signs (24h Range):  Temp:  [97 °F (36.1 °C)-98 °F (36.7 °C)] 97.5 °F (36.4 °C)  Pulse:  [52-67]  67  Resp:  [15-18] 18  SpO2:  [95 %-99 %] 99 %  BP: (118-144)/(53-70) 141/63       Lines/Drains/Airways       Peripheral Intravenous Line  Duration                  Peripheral IV - Single Lumen 12/13/24 1253 18 G Left Antecubital 2 days         Peripheral IV - Single Lumen 12/13/24 1430 20 G Distal;Left;Posterior Forearm 2 days                     Physical Exam  Bilateral merocels with right merocel pulled this AM without epistaxis   Posterior oropharynx clear w/o fresh blood post packing pull   Resting in bed   No inc WOB, no stridor     Significant Labs:  CBC:   Recent Labs   Lab 12/16/24  0511   WBC 5.10   RBC 2.52*   HGB 7.5*   HCT 23.9*      MCV 95   MCH 29.8   MCHC 31.4*     Coagulation:   Recent Labs   Lab 12/15/24  0323   LABPROT 21.4*   INR 2.0*       Significant Diagnostics:  None  Assessment/Plan:     * Recurrent epistaxis  85 yo male with heart valve on Waf[place] and recurrent epistaxis presents to ER due to severe epistaxis. Currently packed with bilateral merocels. Hgb 6. Getting transfused with pRBC and will be admitted to medicine for  close monitoring.    Today pulled right merocel (placed given perforation) with Hgb noted to slightly dip to 7.6 from 8.3 though no active epistaxis.   Will likely pull left merocel this afternoon v tomorrow     - No acute ENT intervention at this time, okay for diet  - Transfuse PRN   - Okay for anti-coagulation  - Maintain merocel at least 3-5 days -serial remove started 12/16 w right pack out, left remains in place    - may have minor drainage on right given large anterior perforation in septum  - Please place patient on anti-staph antibiotics while packing is in place  - May place mustache dressing under nose for trickling, ok if dressing becomes saturated with some red/brown drainage  - Nasal saline q4h while packing in place, even on side with nasal packing. Can continue nasal saline sprays to keep mucosa moist to prevent future bleeding.  - Keep head of bed  elevated  - Apply Afrin to affected nasal cavity if epistaxis recurs, please see detailed instructions below   - Please call ENT with questions          PATIENT INSTRUCTIONS:    Nasal precautions  - DO NOT blow your nose for 2 weeks. The only exception is that you may lightly blow your nose after using a sinus rinse.  - Sneeze/cough with mouth open  - Avoid irritating substances that might make you sneeze, such as dust, chalk, harsh chemicals, and allergic triggers. This might also include spicy foods.  - Do not smoke   - Avoid flying or swimming for 2 weeks.    - Avoid all heavy lifting, straining or bending for 2 weeks.   - Avoid semi-contact sports or vigorous exercising for 3-4 weeks.      Aftercare/ moisturizing recommendations to decrease frequency of nosebleeds:  - Daily nasal saline sprays/rinses  - Nightly application of vaseline/aquaphor to anterior nares  - Room humidification  - Avoidance of nasal cannula if possible (always with humidification and please use face tent, nasal cup, facemask if possible)  Management of medical conditions contributing to epistaxis (coagulopathy, hypertension, etc.)  - Humidification of CPAP appliance if applicable    If rebleeding occurs:   - Apply Afrin liberally to both nostrils  - Apply pressure over the soft part of the nose for 15 minutes (clip or digital pressure, important to have pressure over soft part of nose and consistent pressure (do not release pressure at any point))  - Instruct patient to lean forward, avoid swallowing blood. This can cause nausea and vomiting  - Can repeat these steps above up to 3 times  - Call/reconsult ENT or return to the nearest emergency department if this is unsuccessful          Yesenia Cross MD  Otorhinolaryngology-Head & Neck Surgery  [facility]

## 2024-12-16 NOTE — SUBJECTIVE & OBJECTIVE
Interval History: Pt seen and examined by me this morning. JAKE OLIVO. Pt denies evidence of re-bleeding in the last 24 hrs though hgb did drop from 8.3 -> 7.5. He reports interval improvement in dizziness, fatigue, SOB, and palpitations.     Review of Systems   Constitutional:  Positive for activity change and fatigue. Negative for chills and fever.   HENT:  Negative for trouble swallowing.    Eyes:  Negative for photophobia and visual disturbance.   Respiratory:  Positive for shortness of breath. Negative for chest tightness and wheezing.    Cardiovascular:  Positive for palpitations. Negative for chest pain and leg swelling.   Gastrointestinal:  Negative for abdominal pain, constipation, diarrhea, nausea and vomiting.   Genitourinary:  Negative for dysuria, frequency, hematuria and urgency.   Musculoskeletal:  Negative for arthralgias, back pain and gait problem.   Skin:  Negative for color change and rash.   Neurological:  Positive for dizziness and light-headedness. Negative for syncope, weakness, numbness and headaches.   Psychiatric/Behavioral:  Negative for agitation and confusion. The patient is not nervous/anxious.      Objective:     Vital Signs (Most Recent):  Temp: 97.8 °F (36.6 °C) (12/16/24 0800)  Pulse: (!) 52 (12/16/24 0758)  Resp: 20 (12/16/24 0758)  BP: (!) 160/94 (12/16/24 0758)  SpO2: 95 % (12/16/24 0758) Vital Signs (24h Range):  Temp:  [97 °F (36.1 °C)-97.8 °F (36.6 °C)] 97.8 °F (36.6 °C)  Pulse:  [52-67] 52  Resp:  [15-20] 20  SpO2:  [95 %-99 %] 95 %  BP: (135-160)/(63-94) 160/94     Weight: 67.6 kg (149 lb 0.5 oz)  Body mass index is 24.05 kg/m².    Intake/Output Summary (Last 24 hours) at 12/16/2024 1044  Last data filed at 12/15/2024 1745  Gross per 24 hour   Intake 320 ml   Output 300 ml   Net 20 ml           Physical Exam  Vitals and nursing note reviewed.   Constitutional:       General: He is not in acute distress.     Appearance: He is well-developed. He is ill-appearing.   HENT:       Head: Normocephalic and atraumatic.      Nose:      Comments: L merocel packing in place with fibrillar around packing. No active bleeding from his packing.      Mouth/Throat:      Mouth: Mucous membranes are moist.   Eyes:      Extraocular Movements: Extraocular movements intact.      Conjunctiva/sclera: Conjunctivae normal.   Cardiovascular:      Rate and Rhythm: Normal rate and regular rhythm.      Heart sounds: Normal heart sounds.   Pulmonary:      Effort: Pulmonary effort is normal. No respiratory distress.      Breath sounds: Normal breath sounds. No wheezing.   Abdominal:      General: Bowel sounds are normal. There is no distension.      Palpations: Abdomen is soft.      Tenderness: There is no abdominal tenderness.   Musculoskeletal:         General: No tenderness. Normal range of motion.      Cervical back: Normal range of motion and neck supple.   Skin:     General: Skin is warm and dry.      Capillary Refill: Capillary refill takes less than 2 seconds.      Coloration: Skin is pale (improving).      Findings: No rash.   Neurological:      Mental Status: He is alert and oriented to person, place, and time.      Cranial Nerves: No cranial nerve deficit.      Sensory: No sensory deficit.      Coordination: Coordination normal.   Psychiatric:         Behavior: Behavior normal.         Thought Content: Thought content normal.         Judgment: Judgment normal.             Significant Labs: All pertinent labs within the past 24 hours have been reviewed.  CBC:   Recent Labs   Lab 12/14/24  1644 12/15/24  0323 12/16/24  0511   WBC 5.42 4.99 5.10   HGB 8.0* 8.3* 7.5*   HCT 24.7* 25.6* 23.9*   * 160 159       Significant Imaging: I have reviewed all pertinent imaging results/findings within the past 24 hours.

## 2024-12-16 NOTE — CONSULTS
Bed: 11  Expected date:   Expected time:   Means of arrival:   Comments:  Amb *PAT*  ETOH/ with [employer]    Hospital Medicine Pharmacy Consult Note    Pharmacy to review dose of warfarin  Dariel Urbina is a 84 y.o. male on warfarin therapy for  AF, mechanical aortic valve . PharmD has been consulted for warfarin dosing.    Current order: N/A  Home dose: warfarin 5 mg daily except 2.5 mg on Wednesdays  Coumadin clinic enrollment: Active  INR goal:  2-2.5    Lab Results   Component Value Date    INR 1.6 (H) 12/16/2024    INR 2.0 (H) 12/15/2024    INR 2.4 (H) 12/13/2024     Significant drug interactions: N/A  Diet: Adult Cardiac without supplements     Recommendation(s):   INR subtherapeutic, restart home dose of warfarin 5 mg daily except 2.5 mg on Wednesdays  Continue enoxaparin 70 mg/kg Q24H (CrCl < 30 mL/min)  Can stop when INR >= 2.0  If worsening kidney function would consider changing to heparin drip  Daily INR  Pharmacy will continue to follow and monitor warfarin    Thank you for the consult,  Manny Tapia  Extension [phone]    **Note: Consults are reviewed Monday-Friday 7:00am-3:30pm. The above recommendations are only suggested. The recommendations should be considered in conjunction with all patient factors.**

## 2024-12-17 LAB
ERYTHROCYTE [DISTWIDTH] IN BLOOD BY AUTOMATED COUNT: 16.6 % (ref 11.5–14.5)
HCT VFR BLD AUTO: 25.6 % (ref 40–54)
HGB BLD-MCNC: 8 G/DL (ref 14–18)
INR PPP: 1.3 (ref 0.8–1.2)
MCH RBC QN AUTO: 30 PG (ref 27–31)
MCHC RBC AUTO-ENTMCNC: 31.3 G/DL (ref 32–36)
MCV RBC AUTO: 96 FL (ref 82–98)
PLATELET # BLD AUTO: 167 K/UL (ref 150–450)
PMV BLD AUTO: 10.6 FL (ref 9.2–12.9)
POCT GLUCOSE: 150 MG/DL (ref 70–110)
POCT GLUCOSE: 82 MG/DL (ref 70–110)
POCT GLUCOSE: 92 MG/DL (ref 70–110)
POCT GLUCOSE: 97 MG/DL (ref 70–110)
PROTHROMBIN TIME: 13.8 SEC (ref 9–12.5)
RBC # BLD AUTO: 2.67 M/UL (ref 4.6–6.2)
WBC # BLD AUTO: 4.8 K/UL (ref 3.9–12.7)

## 2024-12-17 PROCEDURE — 63600175 PHARM REV CODE 636 W HCPCS: Mod: HCNC | Performed by: STUDENT IN AN ORGANIZED HEALTH CARE EDUCATION/TRAINING PROGRAM

## 2024-12-17 PROCEDURE — 85027 COMPLETE CBC AUTOMATED: CPT | Mod: HCNC

## 2024-12-17 PROCEDURE — 85610 PROTHROMBIN TIME: CPT | Mod: HCNC | Performed by: STUDENT IN AN ORGANIZED HEALTH CARE EDUCATION/TRAINING PROGRAM

## 2024-12-17 PROCEDURE — 25000003 PHARM REV CODE 250: Mod: HCNC

## 2024-12-17 PROCEDURE — 36415 COLL VENOUS BLD VENIPUNCTURE: CPT | Mod: HCNC | Performed by: STUDENT IN AN ORGANIZED HEALTH CARE EDUCATION/TRAINING PROGRAM

## 2024-12-17 PROCEDURE — 21400001 HC TELEMETRY ROOM: Mod: HCNC

## 2024-12-17 RX ORDER — WARFARIN 7.5 MG/1
7.5 TABLET ORAL ONCE
Status: COMPLETED | OUTPATIENT
Start: 2024-12-17 | End: 2024-12-17

## 2024-12-17 RX ORDER — OXYMETAZOLINE HCL 0.05 %
2 SPRAY, NON-AEROSOL (ML) NASAL 2 TIMES DAILY
Status: DISCONTINUED | OUTPATIENT
Start: 2024-12-17 | End: 2024-12-18 | Stop reason: HOSPADM

## 2024-12-17 RX ORDER — WARFARIN SODIUM 5 MG/1
5 TABLET ORAL
Status: DISCONTINUED | OUTPATIENT
Start: 2024-12-19 | End: 2024-12-18

## 2024-12-17 RX ADMIN — NASAL 1 SPRAY: 6.5 SPRAY NASAL at 10:12

## 2024-12-17 RX ADMIN — ATORVASTATIN CALCIUM 80 MG: 40 TABLET, FILM COATED ORAL at 08:12

## 2024-12-17 RX ADMIN — ALLOPURINOL 100 MG: 100 TABLET ORAL at 08:12

## 2024-12-17 RX ADMIN — DOXYCYCLINE HYCLATE 100 MG: 100 TABLET, COATED ORAL at 08:12

## 2024-12-17 RX ADMIN — NASAL 1 SPRAY: 6.5 SPRAY NASAL at 03:12

## 2024-12-17 RX ADMIN — NASAL 1 SPRAY: 6.5 SPRAY NASAL at 06:12

## 2024-12-17 RX ADMIN — SODIUM BICARBONATE 650 MG TABLET 650 MG: at 09:12

## 2024-12-17 RX ADMIN — MUPIROCIN: 20 OINTMENT TOPICAL at 08:12

## 2024-12-17 RX ADMIN — ISOSORBIDE MONONITRATE 60 MG: 60 TABLET, EXTENDED RELEASE ORAL at 09:12

## 2024-12-17 RX ADMIN — ENOXAPARIN SODIUM 70 MG: 100 INJECTION SUBCUTANEOUS at 08:12

## 2024-12-17 RX ADMIN — SODIUM BICARBONATE 650 MG TABLET 650 MG: at 08:12

## 2024-12-17 RX ADMIN — WARFARIN SODIUM 7.5 MG: 7.5 TABLET ORAL at 06:12

## 2024-12-17 RX ADMIN — Medication 324 MG: at 08:12

## 2024-12-17 RX ADMIN — Medication 2 SPRAY: at 09:12

## 2024-12-17 RX ADMIN — NASAL 1 SPRAY: 6.5 SPRAY NASAL at 09:12

## 2024-12-17 RX ADMIN — NASAL 1 SPRAY: 6.5 SPRAY NASAL at 05:12

## 2024-12-17 NOTE — ASSESSMENT & PLAN NOTE
Anemia is likely due to acute blood loss which was from epistaxis and Iron deficiency. Most recent hemoglobin and hematocrit are listed below.  Recent Labs     12/16/24  0511 12/16/24  1610 12/17/24  0620   HGB 7.5* 8.3* 8.0*   HCT 23.9* 26.6* 25.6*     Plan  - Monitor serial CBC: Daily  - Transfuse PRBC if patient becomes hemodynamically unstable, symptomatic or H/H drops below 7/21.  - Patient received 1 unit of PRBCs in the ED on 12/13, addition unit ordered on 12/14  - Patient's anemia is currently stable  - See epistaxis

## 2024-12-17 NOTE — PLAN OF CARE
Problem: Adult Inpatient Plan of Care  Goal: Plan of Care Review  Outcome: Progressing     Problem: Infection  Goal: Absence of Infection Signs and Symptoms  Outcome: Progressing     Problem: Fall Injury Risk  Goal: Absence of Fall and Fall-Related Injury  Outcome: Progressing     Problem: Diabetes Comorbidity  Goal: Blood Glucose Level Within Targeted Range  Outcome: Progressing

## 2024-12-17 NOTE — PROGRESS NOTES
Hospital Medicine Pharmacy Consult Note    Pharmacy to review dose of warfarin  Dariel Urbina is a 84 y.o. male on warfarin therapy for  AF, mechanical aortic valve . PharmD has been consulted for warfarin dosing.     Current order: N/A  Home dose: warfarin 5 mg daily except 2.5 mg on Wednesdays  Coumadin clinic enrollment: Active  INR goal:  2-2.5    Lab Results   Component Value Date    INR 1.3 (H) 12/17/2024    INR 1.6 (H) 12/16/2024    INR 2.0 (H) 12/15/2024     Significant drug interactions: N/A  Diet: Adult Cardiac without supplements     Recommendation(s):   INR decreased  Boosted dose of warfarin 7.5 mg x1 today then plans to resume home dose  Continue enoxaparin 70 mg/kg Q24H (CrCl < 30 mL/min)  Can stop when INR >= 2.0  If worsening kidney function would consider changing to heparin drip  Daily INR  Pharmacy will continue to follow and monitor warfarin    Thank you for the consult,  Manny Tapia  Extension [phone]    **Note: Consults are reviewed Monday-Friday 7:00am-3:30pm. The above recommendations are only suggested. The recommendations should be considered in conjunction with all patient factors.**

## 2024-12-17 NOTE — PROGRESS NOTES
[facility] [address] [place] [address] Medicine  Progress Note    Patient Name: Dariel Urbina  MRN: [id number]  Patient Class: IP- Inpatient   Admission Date: 12/13/2024  Length of Stay: 3 days  Attending Physician: Cody Carrington MD  Primary Care Provider: Devon Langston Jr., MD      Principal Problem:Recurrent epistaxis    HPI:  Dariel Urbina is a 84 y.o. M with HTN, HLD, combined CHF, paroxysmal A fib, CAD s/p CABG and stent placement,  h/o mechanical aortic valve replacement on warfarin, T2DM, stage IV CKD, gout, and recurrent epistaxis s/p bilateral SP ligation followed by bilateral embolization, cautery on the left septum x2 for persistent bleeding (11/2023 & 12/2023), left AEA ligation (2/2024) and repeat embolization in 08/2024 presenting with recurrent epistaxis. The patient reports his most recent episode of epistaxis started ~3-4 days ago and has been intermittent with persistent worsening and did not stop with persistent pressure and afrin. Pt endorses associated dizziness, lightheadedness, palpitations, and SOB but otherwise denies fever, chills, chest pain, abdominal pain, N/V/D, dysuria, leg swelling or pain, HA, or syncope.       In the ED: Hypertensive to 170s/70s, o/w VSSAF. CBC with Hgb 6.0 (from baseline ~8), no leukocytosis. PT 24/INR 2.4. CMP with Cr 2.6 (from baseline 2.2-2.4). ED provider attempted nasal packing without significant improvement. The patient was given 1u pRBCs, afrin, and NS bolus. ENT consulted & patient was placed in observation for further management.     Overview/Hospital Course:  Mr. Urbina was admitted to hospital medicine for recurrent epistaxis and symptomatic anemia. Hgb 6.0 (from baseline of ~8) on admission, which improved to 8.1 s/p 1U pRBCs but unfortunately dropped back down to 6.2 shortly after. Additional 1U pRBCs ordered on 12/14 with improvement in Hgb to 8.3. ENT consulted and placed bilateral merocel packing with fibrillar around the packing.  Anti-staph antibiotics in place with packing. IR consulted for consideration of inpatient embolization, which was deferred given hemostasis with packing in place. Therapeutic lovenox initiated on 12/15 with active bridging to therapeutic coumadin underway. R nares packing was removed on 12/16 without active re-bleeding. L nares packing removed this morning (12/17).    Interval History: Pt seen and examined by me this morning. VSSAF. PALLAVI. Pt denies evidence of re-bleeding in the last 24 hrs and hgb remains stable this morning. L nares packing removed by ENT this morning. Given the patient is very high risk for rebleeding and is still bridging to therapeutic INR, plan to monitor closely for 24 hrs after packing is removed with tentative plan to discharge in the AM if hgb remains stable and there is no evidence of recurrent epistaxis.     Review of Systems   Constitutional:  Positive for activity change and fatigue. Negative for chills and fever.   HENT:  Negative for trouble swallowing.    Eyes:  Negative for photophobia and visual disturbance.   Respiratory:  Positive for shortness of breath. Negative for chest tightness and wheezing.    Cardiovascular:  Positive for palpitations. Negative for chest pain and leg swelling.   Gastrointestinal:  Negative for abdominal pain, constipation, diarrhea, nausea and vomiting.   Genitourinary:  Negative for dysuria, frequency, hematuria and urgency.   Musculoskeletal:  Negative for arthralgias, back pain and gait problem.   Skin:  Negative for color change and rash.   Neurological:  Positive for dizziness and light-headedness. Negative for syncope, weakness, numbness and headaches.   Psychiatric/Behavioral:  Negative for agitation and confusion. The patient is not nervous/anxious.      Objective:     Vital Signs (Most Recent):  Temp: 98.5 °F (36.9 °C) (12/17/24 1116)  Pulse: (!) 54 (12/17/24 1158)  Resp: 18 (12/17/24 1116)  BP: (!) 114/58 (12/17/24 1116)  SpO2: 96 % (12/17/24  1116) Vital Signs (24h Range):  Temp:  [97.5 °F (36.4 °C)-98.5 °F (36.9 °C)] 98.5 °F (36.9 °C)  Pulse:  [54-67] 54  Resp:  [18-20] 18  SpO2:  [96 %-100 %] 96 %  BP: (114-164)/(58-76) 114/58     Weight: 67.6 kg (149 lb 0.5 oz)  Body mass index is 24.05 kg/m².  No intake or output data in the 24 hours ending 12/17/24 1220          Physical Exam  Vitals and nursing note reviewed.   Constitutional:       General: He is not in acute distress.     Appearance: He is well-developed. He is ill-appearing.   HENT:      Head: Normocephalic and atraumatic.      Nose:      Comments: No evidence of epistaxis     Mouth/Throat:      Mouth: Mucous membranes are moist.   Eyes:      Extraocular Movements: Extraocular movements intact.      Conjunctiva/sclera: Conjunctivae normal.   Cardiovascular:      Rate and Rhythm: Normal rate and regular rhythm.      Heart sounds: + Murmur   Pulmonary:      Effort: Pulmonary effort is normal. No respiratory distress.      Breath sounds: Normal breath sounds. No wheezing.   Abdominal:      General: Bowel sounds are normal. There is no distension.      Palpations: Abdomen is soft.      Tenderness: There is no abdominal tenderness.   Musculoskeletal:         General: No tenderness. Normal range of motion.      Cervical back: Normal range of motion and neck supple.   Skin:     General: Skin is warm and dry.      Capillary Refill: Capillary refill takes less than 2 seconds.      Coloration: Skin is pale (improving).      Findings: No rash.   Neurological:      Mental Status: He is alert and oriented to person, place, and time.      Cranial Nerves: No cranial nerve deficit.      Sensory: No sensory deficit.      Coordination: Coordination normal.   Psychiatric:         Behavior: Behavior normal.         Thought Content: Thought content normal.         Judgment: Judgment normal.             Significant Labs: All pertinent labs within the past 24 hours have been reviewed.  CBC:   Recent Labs   Lab  12/16/24  0511 12/16/24  1610 12/17/24  0620   WBC 5.10 5.63 4.80   HGB 7.5* 8.3* 8.0*   HCT 23.9* 26.6* 25.6*    168 167       Significant Imaging: I have reviewed all pertinent imaging results/findings within the past 24 hours.    Assessment and Plan     * Recurrent epistaxis  - 84 y.o. M on chronic anticoagulation with warfarin and extensive history of recurrent epistaxis s/p bilateral SP ligation followed by bilateral embolization, cautery on the left septum x2 for persistent bleeding (11/2023 & 12/2023), left AEA ligation (2/2024) and repeat embolization in 08/2024 presenting with recurrent epistaxis   - Hgb 6.0 from baseline ~8.0   - Nasal clamp placed in the ED  - ENT consulted, appreciate recs:   - No acute ENT intervention at this time, okay for diet  - Okay for anti-coagulation - currently subtherapeutic          - recommend Afrin for next 48hrs BID while reinitiating warfarin - then stop and encourage FREQUENT daily nasal saline and nasal Ayr gel use          - may have minor drainage given large anterior perforation in septum  - no further need for antibiotics  - Nasal saline q4h to prevent future bleeding.  - Keep head of bed elevated  - Apply Afrin to affected nasal cavity if epistaxis recurs  - Please call ENT with questions    Coronary artery disease involving native coronary artery of native heart without angina pectoris  Patient with known CAD s/p stent placement and CABG, which is controlled Will continue Statin and monitor for S/Sx of angina/ACS. Continue to monitor on telemetry.     JIM (acute kidney injury)  Now back at baseline CKD IV  JIM is likely due to pre-renal azotemia due to intravascular volume depletion. Baseline creatinine is  2.2-2.4 . Most recent creatinine and eGFR are listed below.  Recent Labs     12/15/24  0323 12/16/24  0511   CREATININE 2.6* 2.3*   EGFRNORACEVR 23.7* 27.5*     Plan  - JIM is stable  - Avoid nephrotoxins and renally dose meds for GFR listed above  -  Monitor urine output, serial BMP, and adjust therapy as needed  - s/p IVFs in the ED, encourage PO hydration given IVF shortage     Acute blood loss anemia  Anemia is likely due to acute blood loss which was from epistaxis and Iron deficiency. Most recent hemoglobin and hematocrit are listed below.  Recent Labs     12/16/24  0511 12/16/24  1610 12/17/24  0620   HGB 7.5* 8.3* 8.0*   HCT 23.9* 26.6* 25.6*     Plan  - Monitor serial CBC: Daily  - Transfuse PRBC if patient becomes hemodynamically unstable, symptomatic or H/H drops below 7/21.  - Patient received 1 unit of PRBCs in the ED on 12/13, addition unit ordered on 12/14  - Patient's anemia is currently stable  - See epistaxis     Acidosis  - CO2 20 on BMP  - Likely 2/2 active bleeding  - Continue sodium bicarb supplementation for now  - Continue to monitor on daily labs     H/O mechanical aortic valve replacement  Chronic anticoagulation  - PT/INR 24/2.4 on admission, now 21/2.0  - Discussed with pharmacy - started therapeutic lovenox on 12/15 with plan to continue until INR is >2  - PharmD consulted, appreciate recs  - PT/INR daily     Type 2 diabetes mellitus with stage 4 chronic kidney disease, without long-term current use of insulin  - Patient's FSGs are controlled on current hypoglycemics.   - Last A1c reviewed-   Lab Results   Component Value Date    HGBA1C 5.1 12/13/2024     - Most recent fingerstick glucose reviewed-   Recent Labs   Lab 12/16/24  1600 12/16/24  2058 12/17/24  0714 12/17/24  1115   POCTGLUCOSE 127* 103 82 150*       - Current correctional scale Low  Maintain anti-hyperglycemic dose as follows-   Antihyperglycemics (From admission, onward)      Start     Stop Route Frequency Ordered    12/13/24 1616  insulin aspart U-100 pen 0-5 Units         -- SubQ Before meals & nightly PRN 12/13/24 1517        - Diabetic diet when not NPO    Renovascular hypertension  Hypertension associated with diabetes  - Latest BP and vitals reviewed  - Continue  home meds for HTN:   Hypertension Medications               bumetanide (BUMEX) 1 MG tablet Take 2 tablets (2 mg total) by mouth every other day.    isosorbide mononitrate (IMDUR) 60 MG 24 hr tablet Take 1 tablet (60 mg total) by mouth every evening.   - Goal SBP 120s-140s. Utilize p.r.n. antihypertensives only if patient's BP >180/110 & develop symptoms of worsening CP or SOB.    Hyperlipidemia associated with type 2 diabetes mellitus  - Continue statin       VTE Risk Mitigation (From admission, onward)           Ordered     warfarin (COUMADIN) tablet 2.5 mg  Every Wednesday 12/16/24 1337     enoxaparin injection 70 mg  Every 24 hours         12/16/24 0918     warfarin (COUMADIN) tablet 5 mg  Once per day on Sunday Monday Tuesday Thursday Friday Saturday 12/16/24 1337     IP VTE HIGH RISK PATIENT  Once         12/13/24 1517     Place sequential compression device  Until discontinued         12/13/24 1517                    Discharge Planning   ASTER: 12/17/2024     Code Status: Full Code   Medical Readiness for Discharge Date:   Discharge Plan A: Home with family                        Jenn Frye PA-C  Department of Hospital Medicine   [facility]

## 2024-12-17 NOTE — ASSESSMENT & PLAN NOTE
- 84 y.o. M on chronic anticoagulation with warfarin and extensive history of recurrent epistaxis s/p bilateral SP ligation followed by bilateral embolization, cautery on the left septum x2 for persistent bleeding (11/2023 & 12/2023), left AEA ligation (2/2024) and repeat embolization in 08/2024 presenting with recurrent epistaxis   - Hgb 6.0 from baseline ~8.0   - Nasal clamp placed in the ED  - ENT consulted, appreciate recs:   - No acute ENT intervention at this time, okay for diet  - Okay for anti-coagulation - currently subtherapeutic          - recommend Afrin for next 48hrs BID while reinitiating warfarin - then stop and encourage FREQUENT daily nasal saline and nasal Ayr gel use          - may have minor drainage given large anterior perforation in septum  - no further need for antibiotics  - Nasal saline q4h to prevent future bleeding.  - Keep head of bed elevated  - Apply Afrin to affected nasal cavity if epistaxis recurs  - Please call ENT with questions

## 2024-12-17 NOTE — SUBJECTIVE & OBJECTIVE
Interval History: No acute bleeding overnight. Remaining nasal packing removed this AM.     Medications:  Continuous Infusions:  Scheduled Meds:   allopurinoL  100 mg Oral Daily    atorvastatin  80 mg Oral Daily    bumetanide  2 mg Oral Every other day    doxycycline  100 mg Oral Q12H    enoxparin  1 mg/kg Subcutaneous Q24H (treatment, non-standard time)    ferrous gluconate  324 mg Oral Daily with breakfast    isosorbide mononitrate  60 mg Oral QHS    mupirocin   Topical (Top) Daily    sodium bicarbonate  650 mg Oral BID    sodium chloride  1 spray Each Nostril Q4H    [START ON 12/18/2024] warfarin  2.5 mg Oral Every Wednesday    warfarin  5 mg Oral Once per day on Sunday Monday Tuesday Thursday Friday Saturday     PRN Meds:  Current Facility-Administered Medications:     acetaminophen, 1,000 mg, Oral, Q6H PRN    aluminum-magnesium hydroxide-simethicone, 30 mL, Oral, QID PRN    bisacodyL, 10 mg, Rectal, Daily PRN    dextrose 10%, 12.5 g, Intravenous, PRN    dextrose 10%, 25 g, Intravenous, PRN    glucagon (human recombinant), 1 mg, Intramuscular, PRN    glucose, 16 g, Oral, PRN    glucose, 24 g, Oral, PRN    insulin aspart U-100, 0-5 Units, Subcutaneous, QID (AC + HS) PRN    naloxone, 0.02 mg, Intravenous, PRN    ondansetron, 8 mg, Oral, Q8H PRN    polyethylene glycol, 17 g, Oral, BID PRN    prochlorperazine, 5 mg, Intravenous, Q6H PRN    simethicone, 1 tablet, Oral, QID PRN    sodium chloride 0.9%, 10 mL, Intravenous, Q12H PRN    tranexamic acid, 500 mg, Nebulization, PRN    traZODone, 50 mg, Oral, Nightly PRN     Review of patient's allergies indicates:   Allergen Reactions    Lisinopril     Losartan      Intolerance- elevates potassium level     Objective:     Vital Signs (24h Range):  Temp:  [97.2 °F (36.2 °C)-98.2 °F (36.8 °C)] 98.2 °F (36.8 °C)  Pulse:  [52-67] 66  Resp:  [18-20] 18  SpO2:  [95 %-100 %] 100 %  BP: (117-164)/(58-94) 117/76       Lines/Drains/Airways       Peripheral Intravenous Line  Duration                   Peripheral IV - Single Lumen 12/13/24 1253 18 G Left Antecubital 3 days         Peripheral IV - Single Lumen 12/13/24 1430 20 G Distal;Left;Posterior Forearm 3 days                     Physical Exam  Left merocel removed at bedside without significant oozing post pull  Large approx 1.5-2cm anterior septal perforation without crusting   Sitting up in bed, no discomfort/distress     Significant Labs:  CBC:   Recent Labs   Lab 12/16/24  1610   WBC 5.63   RBC 2.78*   HGB 8.3*   HCT 26.6*      MCV 96   MCH 29.9   MCHC 31.2*     Coagulation:   Recent Labs   Lab 12/16/24  0511   LABPROT 17.3*   INR 1.6*

## 2024-12-17 NOTE — SUBJECTIVE & OBJECTIVE
Interval History: Pt seen and examined by me this morning. KIRSTENBRIANNA. SHAON. Pt denies evidence of re-bleeding in the last 24 hrs and hgb remains stable this morning. L nares packing removed by ENT this morning. Given the patient is very high risk for rebleeding and is still bridging to therapeutic INR, plan to monitor closely for 24 hrs after packing is removed with tentative plan to discharge in the AM if hgb remains stable and there is no evidence of recurrent epistaxis.     Review of Systems   Constitutional:  Positive for activity change and fatigue. Negative for chills and fever.   HENT:  Negative for trouble swallowing.    Eyes:  Negative for photophobia and visual disturbance.   Respiratory:  Positive for shortness of breath. Negative for chest tightness and wheezing.    Cardiovascular:  Positive for palpitations. Negative for chest pain and leg swelling.   Gastrointestinal:  Negative for abdominal pain, constipation, diarrhea, nausea and vomiting.   Genitourinary:  Negative for dysuria, frequency, hematuria and urgency.   Musculoskeletal:  Negative for arthralgias, back pain and gait problem.   Skin:  Negative for color change and rash.   Neurological:  Positive for dizziness and light-headedness. Negative for syncope, weakness, numbness and headaches.   Psychiatric/Behavioral:  Negative for agitation and confusion. The patient is not nervous/anxious.      Objective:     Vital Signs (Most Recent):  Temp: 98.5 °F (36.9 °C) (12/17/24 1116)  Pulse: (!) 54 (12/17/24 1158)  Resp: 18 (12/17/24 1116)  BP: (!) 114/58 (12/17/24 1116)  SpO2: 96 % (12/17/24 1116) Vital Signs (24h Range):  Temp:  [97.5 °F (36.4 °C)-98.5 °F (36.9 °C)] 98.5 °F (36.9 °C)  Pulse:  [54-67] 54  Resp:  [18-20] 18  SpO2:  [96 %-100 %] 96 %  BP: (114-164)/(58-76) 114/58     Weight: 67.6 kg (149 lb 0.5 oz)  Body mass index is 24.05 kg/m².  No intake or output data in the 24 hours ending 12/17/24 1220          Physical Exam  Vitals and nursing note  reviewed.   Constitutional:       General: He is not in acute distress.     Appearance: He is well-developed. He is ill-appearing.   HENT:      Head: Normocephalic and atraumatic.      Nose:      Comments: No evidence of epistaxis     Mouth/Throat:      Mouth: Mucous membranes are moist.   Eyes:      Extraocular Movements: Extraocular movements intact.      Conjunctiva/sclera: Conjunctivae normal.   Cardiovascular:      Rate and Rhythm: Normal rate and regular rhythm.      Heart sounds: Normal heart sounds.   Pulmonary:      Effort: Pulmonary effort is normal. No respiratory distress.      Breath sounds: Normal breath sounds. No wheezing.   Abdominal:      General: Bowel sounds are normal. There is no distension.      Palpations: Abdomen is soft.      Tenderness: There is no abdominal tenderness.   Musculoskeletal:         General: No tenderness. Normal range of motion.      Cervical back: Normal range of motion and neck supple.   Skin:     General: Skin is warm and dry.      Capillary Refill: Capillary refill takes less than 2 seconds.      Coloration: Skin is pale (improving).      Findings: No rash.   Neurological:      Mental Status: He is alert and oriented to person, place, and time.      Cranial Nerves: No cranial nerve deficit.      Sensory: No sensory deficit.      Coordination: Coordination normal.   Psychiatric:         Behavior: Behavior normal.         Thought Content: Thought content normal.         Judgment: Judgment normal.             Significant Labs: All pertinent labs within the past 24 hours have been reviewed.  CBC:   Recent Labs   Lab 12/16/24  0511 12/16/24  1610 12/17/24  0620   WBC 5.10 5.63 4.80   HGB 7.5* 8.3* 8.0*   HCT 23.9* 26.6* 25.6*    168 167       Significant Imaging: I have reviewed all pertinent imaging results/findings within the past 24 hours.

## 2024-12-17 NOTE — ASSESSMENT & PLAN NOTE
Chronic anticoagulation  - PT/INR 24/2.4 on admission, now 21/2.0  - Discussed with pharmacy - started therapeutic lovenox on 12/15 with plan to continue until INR is >2  - PharmD consulted, appreciate recs  - PT/INR daily

## 2024-12-17 NOTE — PROGRESS NOTES
[facility]  Otorhinolaryngology-Head & Neck Surgery  Progress Note    Subjective:     Post-Op Info:  * No surgery found *      Hospital Day: 5     Interval History: No acute bleeding overnight. Remaining nasal packing removed this AM.     Medications:  Continuous Infusions:  Scheduled Meds:   allopurinoL  100 mg Oral Daily    atorvastatin  80 mg Oral Daily    bumetanide  2 mg Oral Every other day    doxycycline  100 mg Oral Q12H    enoxparin  1 mg/kg Subcutaneous Q24H (treatment, non-standard time)    ferrous gluconate  324 mg Oral Daily with breakfast    isosorbide mononitrate  60 mg Oral QHS    mupirocin   Topical (Top) Daily    sodium bicarbonate  650 mg Oral BID    sodium chloride  1 spray Each Nostril Q4H    [START ON 12/18/2024] warfarin  2.5 mg Oral Every Wednesday    warfarin  5 mg Oral Once per day on Sunday Monday Tuesday Thursday Friday Saturday     PRN Meds:  Current Facility-Administered Medications:     acetaminophen, 1,000 mg, Oral, Q6H PRN    aluminum-magnesium hydroxide-simethicone, 30 mL, Oral, QID PRN    bisacodyL, 10 mg, Rectal, Daily PRN    dextrose 10%, 12.5 g, Intravenous, PRN    dextrose 10%, 25 g, Intravenous, PRN    glucagon (human recombinant), 1 mg, Intramuscular, PRN    glucose, 16 g, Oral, PRN    glucose, 24 g, Oral, PRN    insulin aspart U-100, 0-5 Units, Subcutaneous, QID (AC + HS) PRN    naloxone, 0.02 mg, Intravenous, PRN    ondansetron, 8 mg, Oral, Q8H PRN    polyethylene glycol, 17 g, Oral, BID PRN    prochlorperazine, 5 mg, Intravenous, Q6H PRN    simethicone, 1 tablet, Oral, QID PRN    sodium chloride 0.9%, 10 mL, Intravenous, Q12H PRN    tranexamic acid, 500 mg, Nebulization, PRN    traZODone, 50 mg, Oral, Nightly PRN     Review of patient's allergies indicates:   Allergen Reactions    Lisinopril     Losartan      Intolerance- elevates potassium level     Objective:     Vital Signs (24h Range):  Temp:  [97.2 °F (36.2 °C)-98.2 °F (36.8 °C)] 98.2 °F (36.8  °C)  Pulse:  [52-67] 66  Resp:  [18-20] 18  SpO2:  [95 %-100 %] 100 %  BP: (117-164)/(58-94) 117/76       Lines/Drains/Airways       Peripheral Intravenous Line  Duration                  Peripheral IV - Single Lumen 12/13/24 1253 18 G Left Antecubital 3 days         Peripheral IV - Single Lumen 12/13/24 1430 20 G Distal;Left;Posterior Forearm 3 days                     Physical Exam  Left merocel removed at bedside without significant oozing post pull  Large approx 1.5-2cm anterior septal perforation without crusting   Sitting up in bed, no discomfort/distress     Significant Labs:  CBC:   Recent Labs   Lab 12/16/24  1610   WBC 5.63   RBC 2.78*   HGB 8.3*   HCT 26.6*      MCV 96   MCH 29.9   MCHC 31.2*     Coagulation:   Recent Labs   Lab 12/16/24  0511   LABPROT 17.3*   INR 1.6*         Assessment/Plan:     * Recurrent epistaxis  83 yo male with heart valve on [place] and recurrent epistaxis presents to ER due to severe epistaxis. Currently packed with bilateral merocels. Hgb 6. Getting transfused with pRBC and will be admitted to medicine for  close monitoring.    Pulled right merocel (placed given perforation) with Hgb noted to slightly dip to 7.6 from 8.3 though no active epistaxis on 12/16.  12/17 pulled left merocel     - No acute ENT intervention at this time, okay for diet  - Okay for anti-coagulation - currently subtherapeutic    - recommend Afrin for next 48hrs BID while reinitiating warfarin - then stop and encourage FREQUENT daily nasal saline and nasal Ayr gel use    - may have minor drainage given large anterior perforation in septum  - no further need for antibiotics  - Nasal saline q4h to prevent future bleeding.  - Keep head of bed elevated  - Apply Afrin to affected nasal cavity if epistaxis recurs  - Please call ENT with questions    Will arrange outpatient follow up with ENT - will sign off         Yesenia Cross MD  Otorhinolaryngology-Head & Neck Surgery  [facility]

## 2024-12-17 NOTE — ASSESSMENT & PLAN NOTE
- Patient's FSGs are controlled on current hypoglycemics.   - Last A1c reviewed-   Lab Results   Component Value Date    HGBA1C 5.1 12/13/2024     - Most recent fingerstick glucose reviewed-   Recent Labs   Lab 12/16/24  1600 12/16/24 2058 12/17/24  0714 12/17/24  1115   POCTGLUCOSE 127* 103 82 150*       - Current correctional scale Low  Maintain anti-hyperglycemic dose as follows-   Antihyperglycemics (From admission, onward)    Start     Stop Route Frequency Ordered    12/13/24 1616  insulin aspart U-100 pen 0-5 Units         -- SubQ Before meals & nightly PRN 12/13/24 1517      - Diabetic diet when not NPO

## 2024-12-17 NOTE — ASSESSMENT & PLAN NOTE
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 Baseline creatinine is  2.2-2.4 . Most recent creatinine and eGFR are listed below.  Recent Labs     12/15/24  0323 12/16/24  0511   CREATININE 2.6* 2.3*   EGFRNORACEVR 23.7* 27.5*     Plan  - JIM is stable  - Avoid nephrotoxins and renally dose meds for GFR listed above  - Monitor urine output, serial BMP, and adjust therapy as needed  - s/p IVFs in the ED, encourage PO hydration given IVF shortage

## 2024-12-17 NOTE — ASSESSMENT & PLAN NOTE
83 yo male with heart valve on [place]afarin and recurrent epistaxis presents to ER due to severe epistaxis. Currently packed with bilateral merocels. Hgb 6. Getting transfused with pRBC and will be admitted to medicine for  close monitoring.    Pulled right merocel (placed given perforation) with Hgb noted to slightly dip to 7.6 from 8.3 though no active epistaxis on 12/16.  12/17 pulled left merocel     - No acute ENT intervention at this time, okay for diet  - Okay for anti-coagulation - currently subtherapeutic    - recommend Afrin for next 48hrs BID while reinitiating warfarin - then stop and encourage FREQUENT daily nasal saline and nasal Ayr gel use    - may have minor drainage given large anterior perforation in septum  - no further need for antibiotics  - Nasal saline q4h to prevent future bleeding.  - Keep head of bed elevated  - Apply Afrin to affected nasal cavity if epistaxis recurs  - Please call ENT with questions    Will arrange outpatient follow up with ENT - will sign off

## 2024-12-18 VITALS
RESPIRATION RATE: 16 BRPM | SYSTOLIC BLOOD PRESSURE: 142 MMHG | HEART RATE: 69 BPM | WEIGHT: 149.06 LBS | OXYGEN SATURATION: 98 % | HEIGHT: 66 IN | TEMPERATURE: 98 F | DIASTOLIC BLOOD PRESSURE: 89 MMHG | BODY MASS INDEX: 23.95 KG/M2

## 2024-12-18 LAB
ALBUMIN SERPL BCP-MCNC: 3.4 G/DL (ref 3.5–5.2)
ALP SERPL-CCNC: 66 U/L (ref 40–150)
ALT SERPL W/O P-5'-P-CCNC: 11 U/L (ref 10–44)
ANION GAP SERPL CALC-SCNC: 10 MMOL/L (ref 8–16)
AST SERPL-CCNC: 19 U/L (ref 10–40)
BASOPHILS # BLD AUTO: 0.04 K/UL (ref 0–0.2)
BASOPHILS NFR BLD: 0.9 % (ref 0–1.9)
BILIRUB SERPL-MCNC: 0.7 MG/DL (ref 0.1–1)
BUN SERPL-MCNC: 44 MG/DL (ref 8–23)
CALCIUM SERPL-MCNC: 10.2 MG/DL (ref 8.7–10.5)
CHLORIDE SERPL-SCNC: 108 MMOL/L (ref 95–110)
CO2 SERPL-SCNC: 20 MMOL/L (ref 23–29)
CREAT SERPL-MCNC: 2.1 MG/DL (ref 0.5–1.4)
DIFFERENTIAL METHOD BLD: ABNORMAL
EOSINOPHIL # BLD AUTO: 0.2 K/UL (ref 0–0.5)
EOSINOPHIL NFR BLD: 3.9 % (ref 0–8)
ERYTHROCYTE [DISTWIDTH] IN BLOOD BY AUTOMATED COUNT: 16.3 % (ref 11.5–14.5)
ERYTHROCYTE [DISTWIDTH] IN BLOOD BY AUTOMATED COUNT: 16.5 % (ref 11.5–14.5)
EST. GFR  (NO RACE VARIABLE): 30.7 ML/MIN/1.73 M^2
GLUCOSE SERPL-MCNC: 111 MG/DL (ref 70–110)
HCT VFR BLD AUTO: 25.6 % (ref 40–54)
HCT VFR BLD AUTO: 27.5 % (ref 40–54)
HGB BLD-MCNC: 7.9 G/DL (ref 14–18)
HGB BLD-MCNC: 8.4 G/DL (ref 14–18)
IMM GRANULOCYTES # BLD AUTO: 0.01 K/UL (ref 0–0.04)
IMM GRANULOCYTES NFR BLD AUTO: 0.2 % (ref 0–0.5)
INR PPP: 1.3 (ref 0.8–1.2)
LYMPHOCYTES # BLD AUTO: 1.2 K/UL (ref 1–4.8)
LYMPHOCYTES NFR BLD: 25.6 % (ref 18–48)
MCH RBC QN AUTO: 30 PG (ref 27–31)
MCH RBC QN AUTO: 30.1 PG (ref 27–31)
MCHC RBC AUTO-ENTMCNC: 30.5 G/DL (ref 32–36)
MCHC RBC AUTO-ENTMCNC: 30.9 G/DL (ref 32–36)
MCV RBC AUTO: 97 FL (ref 82–98)
MCV RBC AUTO: 99 FL (ref 82–98)
MONOCYTES # BLD AUTO: 0.6 K/UL (ref 0.3–1)
MONOCYTES NFR BLD: 12.9 % (ref 4–15)
NEUTROPHILS # BLD AUTO: 2.6 K/UL (ref 1.8–7.7)
NEUTROPHILS NFR BLD: 56.5 % (ref 38–73)
NRBC BLD-RTO: 0 /100 WBC
PLATELET # BLD AUTO: 173 K/UL (ref 150–450)
PLATELET # BLD AUTO: 184 K/UL (ref 150–450)
PMV BLD AUTO: 10.3 FL (ref 9.2–12.9)
PMV BLD AUTO: 10.7 FL (ref 9.2–12.9)
POCT GLUCOSE: 119 MG/DL (ref 70–110)
POTASSIUM SERPL-SCNC: 4 MMOL/L (ref 3.5–5.1)
PROT SERPL-MCNC: 6.8 G/DL (ref 6–8.4)
PROTHROMBIN TIME: 13.6 SEC (ref 9–12.5)
RBC # BLD AUTO: 2.63 M/UL (ref 4.6–6.2)
RBC # BLD AUTO: 2.79 M/UL (ref 4.6–6.2)
SODIUM SERPL-SCNC: 138 MMOL/L (ref 136–145)
WBC # BLD AUTO: 4.65 K/UL (ref 3.9–12.7)
WBC # BLD AUTO: 5.31 K/UL (ref 3.9–12.7)

## 2024-12-18 PROCEDURE — 85027 COMPLETE CBC AUTOMATED: CPT | Mod: HCNC

## 2024-12-18 PROCEDURE — 36415 COLL VENOUS BLD VENIPUNCTURE: CPT | Mod: HCNC

## 2024-12-18 PROCEDURE — 85025 COMPLETE CBC W/AUTO DIFF WBC: CPT | Mod: HCNC

## 2024-12-18 PROCEDURE — 80053 COMPREHEN METABOLIC PANEL: CPT | Mod: HCNC

## 2024-12-18 PROCEDURE — 25000003 PHARM REV CODE 250: Mod: HCNC

## 2024-12-18 PROCEDURE — 36415 COLL VENOUS BLD VENIPUNCTURE: CPT | Mod: HCNC | Performed by: STUDENT IN AN ORGANIZED HEALTH CARE EDUCATION/TRAINING PROGRAM

## 2024-12-18 PROCEDURE — 85610 PROTHROMBIN TIME: CPT | Mod: HCNC | Performed by: STUDENT IN AN ORGANIZED HEALTH CARE EDUCATION/TRAINING PROGRAM

## 2024-12-18 RX ORDER — BISMUTH SUBSALICYLATE 525 MG/30ML
30 LIQUID ORAL EVERY 6 HOURS PRN
Status: DISCONTINUED | OUTPATIENT
Start: 2024-12-18 | End: 2024-12-18 | Stop reason: HOSPADM

## 2024-12-18 RX ORDER — WARFARIN SODIUM 5 MG/1
5 TABLET ORAL DAILY
Status: DISCONTINUED | OUTPATIENT
Start: 2024-12-18 | End: 2024-12-18 | Stop reason: HOSPADM

## 2024-12-18 RX ORDER — VITAMIN A 3000 MCG
2 CAPSULE ORAL 4 TIMES DAILY
Qty: 60 ML | Refills: 3 | Status: SHIPPED | OUTPATIENT
Start: 2024-12-18

## 2024-12-18 RX ORDER — ENOXAPARIN SODIUM 100 MG/ML
60 INJECTION SUBCUTANEOUS DAILY
Qty: 3 ML | Refills: 0 | Status: SHIPPED | OUTPATIENT
Start: 2024-12-18 | End: 2024-12-23

## 2024-12-18 RX ORDER — OXYMETAZOLINE HCL 0.05 %
2 SPRAY, NON-AEROSOL (ML) NASAL
Qty: 30 ML | Refills: 0 | Status: SHIPPED | OUTPATIENT
Start: 2024-12-18

## 2024-12-18 RX ADMIN — MUPIROCIN: 20 OINTMENT TOPICAL at 08:12

## 2024-12-18 RX ADMIN — Medication 324 MG: at 08:12

## 2024-12-18 RX ADMIN — Medication 2 SPRAY: at 05:12

## 2024-12-18 RX ADMIN — NASAL 1 SPRAY: 6.5 SPRAY NASAL at 01:12

## 2024-12-18 RX ADMIN — SODIUM BICARBONATE 650 MG TABLET 650 MG: at 08:12

## 2024-12-18 RX ADMIN — BISMUTH SUBSALICYLATE 30 ML: 525 LIQUID ORAL at 05:12

## 2024-12-18 RX ADMIN — Medication 1 CAPSULE: at 08:12

## 2024-12-18 RX ADMIN — Medication 2 SPRAY: at 08:12

## 2024-12-18 RX ADMIN — ALLOPURINOL 100 MG: 100 TABLET ORAL at 08:12

## 2024-12-18 RX ADMIN — NASAL 1 SPRAY: 6.5 SPRAY NASAL at 05:12

## 2024-12-18 RX ADMIN — BUMETANIDE 2 MG: 1 TABLET ORAL at 08:12

## 2024-12-18 RX ADMIN — ATORVASTATIN CALCIUM 80 MG: 40 TABLET, FILM COATED ORAL at 08:12

## 2024-12-18 NOTE — PLAN OF CARE
[facility]  Discharge Final Note    Primary Care Provider: Devon Langston Jr., MD    Expected Discharge Date: 12/18/2024    Patient discharged to home via personal transportation.     Patient's bedside nurse and patient notified of the above.    Discharge Plan A and Plan B have been determined by review of patient's clinical status, future medical and therapeutic needs, and coverage/benefits for post-acute care in coordination with multidisciplinary team members.        Final Discharge Note (most recent)       Final Note - 12/18/24 1600          Final Note    Assessment Type Final Discharge Note (P)      Anticipated Discharge Disposition Home or Self Care (P)         Post-Acute Status    Post-Acute Authorization Other (P)      Other Status No Post-Acute Service Needs (P)                      Important Message from Medicare  Important Message from Medicare regarding Discharge Appeal Rights: Given to patient/caregiver, Explained to patient/caregiver, Signed/date by patient/caregiver     Date IMM was signed: 12/18/24  Time IMM was signed: 0842    Contact Info       [facility]   Specialty: Otolaryngology    [address] [facility] [address]   Phone: [phone]       Next Steps: Follow up    Instructions: Message sent for nurse to call and schedule follow up appointment; however, if you do not hear from someone within 48 hours contact the number listed.            Future Appointments   Date Time Provider Department Center   12/19/2024 10:00 AM Jesus Bueno MD [facility]   12/20/2024 10:50 AM LAB, APPOINTMENT [facility]   12/26/2024  1:30 PM David Delaney NP [facility]   12/27/2024 10:30 AM Jono Russell MD [facility]   1/10/2025 10:00 AM Devon Langston Jr., MD [facility] [address] [facility]   1/22/2025 11:15 AM Usha Muñiz MD [facility] DERM [facility] [address]   1/27/2025  1:00 PM Jono Russell MD [facility] scheduled  post-discharge follow-up appointment and information added to AVS.     Payton Valera LMSW  [facility]  Ext. [phone]

## 2024-12-18 NOTE — DISCHARGE SUMMARY
[facility] - Observation [address] [place] [address] Medicine  Discharge Summary      Patient Name: Dariel Urbina  MRN: [id number]  EMELIA: [id number]  Patient Class: IP- Inpatient  Admission Date: 12/13/2024  Hospital Length of Stay: 4 days  Discharge Date and Time: 12/18/2024  3:00 PM  Attending Physician: Gertrude att. providers found   Discharging Provider: Jenn Frye PA-C  Primary Care Provider: Devon Langston Jr., MD  [facility] Medicine Team: [facility] MED E Jenn Frye PA-C  Primary Care Team: [facility] MED E    HPI:   Dariel Urbina is a 84 y.o. M with HTN, HLD, combined CHF, paroxysmal A fib, CAD s/p CABG and stent placement,  h/o mechanical aortic valve replacement on warfarin, T2DM, stage IV CKD, gout, and recurrent epistaxis s/p bilateral SP ligation followed by bilateral embolization, cautery on the left septum x2 for persistent bleeding (11/2023 & 12/2023), left AEA ligation (2/2024) and repeat embolization in 08/2024 presenting with recurrent epistaxis. The patient reports his most recent episode of epistaxis started ~3-4 days ago and has been intermittent with persistent worsening and did not stop with persistent pressure and afrin. Pt endorses associated dizziness, lightheadedness, palpitations, and SOB but otherwise denies fever, chills, chest pain, abdominal pain, N/V/D, dysuria, leg swelling or pain, HA, or syncope.       In the ED: Hypertensive to 170s/70s, o/w VSSAF. CBC with Hgb 6.0 (from baseline ~8), no leukocytosis. PT 24/INR 2.4. CMP with Cr 2.6 (from baseline 2.2-2.4). ED provider attempted nasal packing without significant improvement. The patient was given 1u pRBCs, afrin, and NS bolus. ENT consulted & patient was placed in observation for further management.     * No surgery found *      Hospital Course:   Mr. Urbina was admitted to hospital medicine for recurrent epistaxis and symptomatic anemia. Hgb 6.0 (from baseline of ~8) on admission, which improved to 8.3 s/p 2u pRBCs. ENT consulted and  placed bilateral merocel packing with fibrillar around the packing. Pt was started on topical mupirocin and PO doxycycline while packing was in place. IR consulted for consideration of inpatient embolization, which was deferred given hemostasis with packing in place. Therapeutic lovenox initiated on 12/15 with active bridging to therapeutic coumadin underway. R nares packing was removed on 12/16 without active re-bleeding. L nares packing removed on 12/17. Pt with 4-5 episodes of loose stools on evening of 12/17. C. Diff ordered and later cancelled given complete resolution of diarrhea with peptobismol and probiotiocs. Pt had a mild, brief episode of bleeding from the L nares on the AM on 12/18, which was quickly controlled with Afrin and pressure. Repeat Hgb following re-bleed improved from 7.9 -> 8.4.     Pt was seen and evaluated by me this morning, reports feeling well, and is very eager to discharge home. All questions were answered. Patient acknowledged understanding of discharge instructions and feels safe to discharge home. Patient was discharged on 12/18/2024 in stable condition with ENT follow-up and acute care at home services. Education regarding condition provided and return precautions given.     *Of note, the patient was instructed to continue lovenox 60 mg daily, as well as warfarin 5 mg daily, (per pharmacy recs) until repeat INR on Friday 12/20. Outpatient coumadin clinic notified of plan to repeat INR and will continue anticoagulation recommendations from there.     Physical Exam  Gen: in NAD, appears stated age  Neuro: AAOx3, motor, sensory, and strength grossly intact BL  HEENT: EOMI, PERRLA; no JVD appreciated  CVS: RRR, no m/r/g  Resp: lungs CTAB, no w/r/r; no belabored breathing or accessory muscle use appreciated   Abd: NTND, soft to palpation  Extrem: no UE or LE edema BL     Goals of Care Treatment Preferences:  Code Status: Full Code          What is most important right now is to focus  on remaining as independent as possible, symptom/pain control, extending life as long as possible, even it it means sacrificing quality.  Accordingly, we have decided that the best plan to meet the patient's goals includes continuing with treatment.      SDOH Screening:  The patient was screened for utility difficulties, food insecurity, transport difficulties, housing insecurity, and interpersonal safety and there were no concerns identified this admission.     Consults:   Consults (From admission, onward)          Status Ordering Provider     [facility] Medicine PharmD Consult  Once        Provider:  (Not yet assigned)    Acknowledged MIKHAIL LUCAS     Inpatient consult to Interventional Radiology  Once        Provider:  (Not yet assigned)    Completed MIKHALI LUCAS     Inpatient consult to ENT  Once        Provider:  (Not yet assigned)    Completed TRISTON ROSSI            Final Active Diagnoses:    Diagnosis Date Noted POA    PRINCIPAL PROBLEM:  Recurrent epistaxis [R04.0] 12/21/2022 Yes    Chronic kidney disease, stage 4 (severe) [N18.4] 06/12/2024 Yes    Paroxysmal atrial fibrillation [I48.0] 02/06/2023 Yes    Coronary artery disease involving native coronary artery of native heart without angina pectoris [I25.10] 12/29/2022 Yes    Acute blood loss anemia [D62] 12/21/2022 Yes    JIM (acute kidney injury) [N17.9] 12/21/2022 Yes    Hypertension associated with diabetes [E11.59, I15.2] 09/25/2019 Yes    Acidosis [E87.20] 03/20/2018 Yes    H/O mechanical aortic valve replacement [Z95.2] 09/17/2014 Not Applicable    Type 2 diabetes mellitus with stage 4 chronic kidney disease, without long-term current use of insulin [E11.22, N18.4] 10/02/2013 Yes     Chronic    Chronic anticoagulation [Z79.01] 09/26/2012 Not Applicable    Hyperlipidemia associated with type 2 diabetes mellitus [E11.69, E78.5] 09/26/2012 Yes    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] 09/26/2012 Yes      Problems Resolved During this Admission:        Discharged Condition: good    Disposition: Home or Self Care    Follow Up:   Follow-up Information       [facility] E[facility] Follow up.    Specialty: Otolaryngology  Why: Message sent for nurse to call and schedule follow up appointment; however, if you do not hear from someone within 48 hours contact the number listed.  Contact information:  Erika Duval  [facility] [id number]  [phone]                         Patient Instructions:      PROTIME-INR   Standing Status: Future Standing Exp. Date: 03/18/26     Order Specific Question Answer Comments   Send normal result to authorizing provider's In Basket if patient is active on MyChart: Yes      CBC W/ AUTO DIFFERENTIAL   Standing Status: Future Standing Exp. Date: 03/18/26     Ambulatory referral/consult to Outpatient Case Management   Referral Priority: Routine Referral Type: Consultation   Referral Reason: Specialty Services Required   Number of Visits Requested: 1     Ambulatory referral/consult to Acute Care at Home   Standing Status: Future   Referral Priority: Routine Referral Type: Consultation   Referred to Provider: CHARAN PROVIDER Requested Specialty: Internal Medicine   Number of Visits Requested: 1     Ambulatory referral/consult to ENT   Standing Status: Future   Referral Priority: Urgent Referral Type: Consultation   Referral Reason: Specialty Services Required   Requested Specialty: Otolaryngology   Number of Visits Requested: 1       Significant Diagnostic Studies: Labs: CBC   Recent Labs   Lab 12/17/24  0620 12/18/24  0235 12/18/24  0833   WBC 4.80 5.31 4.65   HGB 8.0* 7.9* 8.4*   HCT 25.6* 25.6* 27.5*    173 184       Pending Diagnostic Studies:       None           Medications:  Reconciled Home Medications:      Medication List        START taking these medications      enoxaparin 60 mg/0.6 mL Syrg  Commonly known as: LOVENOX  Inject 0.6 mLs (60 mg total) into the skin once daily. for 5 days     Lactobacillus rhamnosus GG 10  billion cell capsule  Commonly known as: CULTURELLE  Take 1 capsule by mouth 2 (two) times daily.            CHANGE how you take these medications      NASAL DECONGESTANT (OXYMETAZL) 0.05 % nasal spray  Generic drug: oxymetazoline  2 sprays by Nasal route as needed (nose bleeds).  What changed: reasons to take this            CONTINUE taking these medications      acetaminophen 500 MG tablet  Commonly known as: TYLENOL  Take 500 mg by mouth daily as needed for Pain.     albuterol 90 mcg/actuation inhaler  Commonly known as: VENTOLIN HFA  Inhale 2 puffs into the lungs every 6 (six) hours as needed for Wheezing. Rescue     allopurinoL 100 MG tablet  Commonly known as: ZYLOPRIM  Take 1 tablet (100 mg total) by mouth once daily.     bumetanide 1 MG tablet  Commonly known as: BUMEX  Take 2 tablets (2 mg total) by mouth every other day.     DEEP SEA NASAL 0.65 % nasal spray  Generic drug: sodium chloride  Use 2 sprays by Nasal route every 4 hours. Apply into nasal cavities daily with nightly application of vaseline/aquaphor to anterior nares. Keep head of bed elevated.     ferrous gluconate 324 MG tablet  Commonly known as: FERGON  TAKE 1 TABLET EVERY DAY WITH BREAKFAST     fish oil-omega-3 fatty acids 300-1,000 mg capsule  Take 1 capsule by mouth once daily.     isosorbide mononitrate 60 MG 24 hr tablet  Commonly known as: IMDUR  Take 1 tablet (60 mg total) by mouth every evening.     rosuvastatin 40 MG Tab  Commonly known as: CRESTOR  TAKE 1 TABLET EVERY EVENING.     traZODone 50 MG tablet  Commonly known as: DESYREL  Take 1 tablet (50 mg total) by mouth every evening.     warfarin 5 MG tablet  Commonly known as: COUMADIN  TAKE 1/2 TABLET (2.5MG) SATURDAY, THEN TAKE 1 TABLET (5MG) ALL OTHER DAYS OR AS INSTRUCTED BY COUMADIN CLINIC              Indwelling Lines/Drains at time of discharge:   Lines/Drains/Airways       None                   Time spent on the discharge of patient: 36 minutes         Jenn Frye  TOSHIA  Department of Hospital Medicine  [facility] - Observation 11H

## 2024-12-18 NOTE — NURSING
Pt requesting imodium and reports having diarrhea 4x starting about an hour ago. Reports black tarry stool has resolved and stool now brown liquid. Pt denies abdominal cramps. Jan PA notified. C. Diff precautions and sample collection ordered.   Per C. Diff evaluation form, without new abd pain, leukocytosis, fever, or immunocompromised status, [facility] approval required before sending off C. Diff sample. PRN Pepto ordered for diarrhea.

## 2024-12-18 NOTE — PLAN OF CARE
Plan of care discussed with patient. No signs of nose bleed. Pt with new diarrhea, pepto given. Pending [facility] approval for C. Diff collection. C. Diff precautions in place per order. Patient is free of fall/trauma/injury.     Problem: Adult Inpatient Plan of Care  Goal: Plan of Care Review  Outcome: Progressing  Goal: Patient-Specific Goal (Individualized)  Outcome: Progressing     Problem: Infection  Goal: Absence of Infection Signs and Symptoms  Outcome: Progressing     Problem: Fall Injury Risk  Goal: Absence of Fall and Fall-Related Injury  Outcome: Progressing     Problem: Diabetes Comorbidity  Goal: Blood Glucose Level Within Targeted Range  Outcome: Progressing     Problem: Acute Kidney Injury/Impairment  Goal: Fluid and Electrolyte Balance  Outcome: Progressing

## 2024-12-18 NOTE — NURSING
"0530 pt reports nose bleeding again. Mild bleeding with some spots on tissue. Alexander GOLDSMITH contacted, clarified if should give additional dose of Afrin or PRN transexamic acid nebulizer, instructed to give Afrin now per ENT note stating administer if bleeding returns and also give nebulizer for active bleeding. Charge RN aware and called RT. 0545 Afrin given, pt states he "thinks it stopped." Alexander GOLDSMITH aware, stated if bleeding has stopped, hold off on neb and give if bleeding returns.   "

## 2024-12-18 NOTE — PROGRESS NOTES
Hospital Medicine Pharmacy Consult Note    Pharmacy to review dose of warfarin  Dariel Urbina is a 84 y.o. male on warfarin therapy for  AF, mechanical aortic valve . PharmD has been consulted for warfarin dosing.     Current order: N/A  Home dose: warfarin 5 mg daily except 2.5 mg on Wednesdays  Coumadin clinic enrollment: Active  INR goal:  2-2.5    Lab Results   Component Value Date    INR 1.3 (H) 12/18/2024    INR 1.3 (H) 12/17/2024    INR 1.6 (H) 12/16/2024     Significant drug interactions:N/A  Diet: Adult Cardiac without supplements     Recommendation(s):   INR remains subtherapeutic - received warfarin 7.5 mg yesterday, increase today's dose from 2.5 mg to 5 mg and continue 5 mg daily  Continue enoxaparin bridge (1 mg/kg Q24H) until INR >= 2.0  If discharging today, check INR Friday (I already messaged Coumadin Clinic in anticipation of discharge)  Daily INR  Pharmacy will continue to follow and monitor warfarin    Thank you for the consult,  Manny Tapia  Extension [phone]    **Note: Consults are reviewed Monday-Friday 7:00am-3:30pm. The above recommendations are only suggested. The recommendations should be considered in conjunction with all patient factors.**

## 2024-12-18 NOTE — DISCHARGE INSTRUCTIONS
Your INR is still subtherapeutic, so please continue your lovenox 60 mg daily along with your warfarin 5mg daily until otherwise instructed by your coumadin clinic. INR and CBC recheck is on Friday.    Nasal precautions  - DO NOT blow your nose for 2 weeks. The only exception is that you may lightly blow your nose after using a sinus rinse.  - Sneeze/cough with mouth open  - Avoid irritating substances that might make you sneeze, such as dust, chalk, harsh chemicals, and allergic triggers. This might also include spicy foods.  - Do not smoke   - Avoid flying or swimming for 2 weeks.    - Avoid all heavy lifting, straining or bending for 2 weeks.   - Avoid semi-contact sports or vigorous exercising for 3-4 weeks.       Aftercare/ moisturizing recommendations to decrease frequency of nosebleeds:  - VERY FREQUENT (at least every 4 hours) nasal saline sprays/rinses  - Nightly application of vaseline/aquaphor to anterior nares  - Room humidification  - Avoidance of nasal cannula if possible (always with humidification and please use face tent, nasal cup, facemask if possible)  Management of medical conditions contributing to epistaxis (coagulopathy, hypertension, etc.)  - Humidification of CPAP appliance if applicable     If rebleeding occurs:   - Apply Afrin liberally to both nostrils  - Apply pressure over the soft part of the nose for 15 minutes (clip or digital pressure, important to have pressure over soft part of nose and consistent pressure (do not release pressure at any point))  - Instruct patient to lean forward, avoid swallowing blood. This can cause nausea and vomiting  - Can repeat these steps above up to 3 times    Take Culturelle (your probiotic)     It has been a pleasure caring for you, and I hope you continue to feel better and stronger every day! Please do not hesitate to reach out to me with any questions or concerns.     Jenn Frye PA-C  [facility] Medicine  [email or web address]  [phone]

## 2024-12-19 ENCOUNTER — LAB VISIT (OUTPATIENT)
Dept: LAB | Facility: HOSPITAL | Age: 84
End: 2024-12-19
Payer: MEDICARE

## 2024-12-19 ENCOUNTER — ANTI-COAG VISIT (OUTPATIENT)
Dept: CARDIOLOGY | Facility: CLINIC | Age: 84
End: 2024-12-19
Payer: MEDICARE

## 2024-12-19 ENCOUNTER — PROCEDURE VISIT (OUTPATIENT)
Dept: DERMATOLOGY | Facility: CLINIC | Age: 84
End: 2024-12-19
Payer: MEDICARE

## 2024-12-19 VITALS
BODY MASS INDEX: 23.84 KG/M2 | WEIGHT: 148.38 LBS | HEIGHT: 66 IN | DIASTOLIC BLOOD PRESSURE: 60 MMHG | SYSTOLIC BLOOD PRESSURE: 148 MMHG | HEART RATE: 59 BPM

## 2024-12-19 DIAGNOSIS — D04.4 SQUAMOUS CELL CARCINOMA IN SITU OF SCALP: Primary | ICD-10-CM

## 2024-12-19 DIAGNOSIS — Z95.2 H/O MECHANICAL AORTIC VALVE REPLACEMENT: ICD-10-CM

## 2024-12-19 DIAGNOSIS — Z79.01 CHRONIC ANTICOAGULATION: Primary | ICD-10-CM

## 2024-12-19 DIAGNOSIS — Z79.01 CHRONIC ANTICOAGULATION: ICD-10-CM

## 2024-12-19 LAB
INR PPP: 1.3 (ref 0.8–1.2)
PROTHROMBIN TIME: 14.4 SEC (ref 9–12.5)

## 2024-12-19 PROCEDURE — 85610 PROTHROMBIN TIME: CPT | Mod: HCNC | Performed by: INTERNAL MEDICINE

## 2024-12-19 PROCEDURE — 93793 ANTICOAG MGMT PT WARFARIN: CPT | Mod: S$GLB,,,

## 2024-12-19 PROCEDURE — 36415 COLL VENOUS BLD VENIPUNCTURE: CPT | Mod: HCNC | Performed by: INTERNAL MEDICINE

## 2024-12-19 NOTE — PROGRESS NOTES
PROCEDURE: Mohs' Micrographic Surgery    INDICATION: Biopsy-proven skin cancer of cosmetically and functionally important areas, including head, neck, genital, hand, foot, or areas known for having difficulty in healing, such as the lower anterior legs. Tumor with ill-defined borders.    REFERRING PROVIDER: Usha Muñiz MD    CASE NUMBER:     ANESTHETIC: 0.5 cc 1% Lidocaine with Epinephrine 1:100,000 and 2.5 cc 0.5% Lidocaine with Epi 1:200,000 mixed 1:1 with 0.5% Bupivacaine    SURGICAL PREP: Hibiclens    SURGEON: Jesus Bueno MD    ASSISTANTS: Kimmie Arzola PA-C and Haley Matthews Surg Tech    PREOPERATIVE DIAGNOSIS: squamous cell carcinoma in situ    POSTOPERATIVE DIAGNOSIS: squamous cell carcinoma in situ; squamous cell carcinoma- well differentiated    PATHOLOGIC DIAGNOSIS: squamous cell carcinoma in situ; squamous cell carcinoma- well differentiated    HISTOLOGY OF SPECIMENS IN FIRST STAGE:   Debulking tumor specimen revealed well differentiated SCC resulting in an addendum to the initial diagnosis.  Tumor Type:  No tumor seen.    STAGES OF MOHS' SURGERY PERFORMED: 1    TUMOR-FREE PLANE ACHIEVED: Yes    HEMOSTASIS: electrocoagulation     SPECIMENS: 2     LOCATION: crown of scalp. Location verified with Dr. Muñiz's clinical photograph. Patient also verified location by looking at photo taken prior to procedure.     INITIAL LESION SIZE: 0.6 x 0.9 cm    FINAL DEFECT SIZE: 0.9 x 1.3 cm    WOUND REPAIR/DISPOSITION: The patient tolerated Mohs' Micrographic Surgery for a squamous cell carcinoma in situ very well. When the tumor was completely removed, a repair of the surgical defect was undertaken.      PROCEDURE: Complex Linear Repair    INDICATION: Status post Mohs' Micrographic Surgery for squamous cell carcinoma in situ.    CASE NUMBER:     SURGEON: Jesus Bueno MD    ASSISTANTS: Kimmie Arzola PA-C and Haley Matthews Surg Tech    ANESTHETIC: 1.5 cc 1% Lidocaine with Epinephrine 1:100,000    SURGICAL  "PREP: Hibiclens, prepped by Haley Matthews, Surg Tech    LOCATION: crown of scalp    DEFECT SIZE: 0.9 x 1.3 cm    WOUND REPAIR/DISPOSITION:  After the patient's carcinoma had been completely removed with Mohs' Micrographic Surgery, a repair of the surgical defect was undertaken. The patient was returned to the operating suite where the area of crown of scalp was prepped, draped, and anesthetized in the usual sterile fashion. The wound was widely undermined in all directions. The wound was undermined to a distance at least the maximum width of the defect as measured perpendicular to the closure line along at least one entire edge of the defect, in this case 2 cm. Then, electrocoagulation was used to obtain meticulous hemostasis. 3-0 Vicryl and 4-0 Vicryl buried vertical mattress sutures were placed into the subcutaneous and dermal plane to close the wound and birdie the cutaneous wound edge. Bilateral dog ears were identified and were removed by a standard Burow's triangle technique. The cutaneous wound edges were closed using interrupted 3-0 Prolene suture.    The patient tolerated the procedure well.    The area was cleaned and dressed appropriately and the patient was given wound care instructions, as well as appointment for follow-up evaluation and suture removal in 14 days.    LENGTH OF REPAIR: 2.7 cm    Vitals:    12/19/24 0955 12/19/24 1139   BP: (!) 138/53 (!) 148/60   BP Location: Left arm Left arm   Patient Position: Sitting Sitting   Pulse: 67 (!) 59   Weight: 67.3 kg (148 lb 5.9 oz)    Height: 5' 6" (1.676 m)          "

## 2024-12-19 NOTE — PROGRESS NOTES
[facility]    12/19/2024 2:49 PM    Assessment/Plan:    Patient presents today with subtherapeutic  INR.    Assessment of patient findings and chart review: Reviewed    Recommendation for patient's warfarin regimen: Boost dose today to 7.5mg then resume current maintenance dose. Pt to continue lovenox.     Recommend repeat INR Monday.   _________________________________________________________________    Dariel Urbina (84 y.o.) is followed by the [facility].    Anticoagulation Summary  As of 12/19/2024      INR goal:  2.0-2.5   TTR:  67.1% (12.2 y)   INR used for dosing:  --   Warfarin maintenance plan:  2.5 mg (5 mg x 0.5) every Wed; 5 mg (5 mg x 1) all other days   Weekly warfarin total:  32.5 mg   Plan last modified:  Laurie Patino, PharmD (10/14/2024)   Next INR check:  12/23/2024   Target end date:  --    Indications    Chronic anticoagulation [Z79.01]  H/O mechanical aortic valve replacement [Z95.2]                 Anticoagulation Episode Summary       INR check location:  Clinic Lab    Preferred lab:  --    Send INR reminders to:  [facility] COUMADIN MONITORING POOL    Comments:  [facility] IM - Internal Med Clinic only Mon - Thu // carpel tunnel release 5/19 - target low end of range          Anticoagulation Care Providers       Provider Role Specialty Phone number    Devon Langston Jr., MD [facility] Internal Medicine [phone]

## 2024-12-20 ENCOUNTER — PATIENT OUTREACH (OUTPATIENT)
Dept: ADMINISTRATIVE | Facility: CLINIC | Age: 84
End: 2024-12-20
Payer: MEDICARE

## 2024-12-20 ENCOUNTER — PATIENT OUTREACH (OUTPATIENT)
Dept: ADMINISTRATIVE | Facility: OTHER | Age: 84
End: 2024-12-20
Payer: MEDICARE

## 2024-12-20 ENCOUNTER — OUTPATIENT CASE MANAGEMENT (OUTPATIENT)
Dept: ADMINISTRATIVE | Facility: OTHER | Age: 84
End: 2024-12-20
Payer: MEDICARE

## 2024-12-20 NOTE — PROGRESS NOTES
This Community Health Worker (CHW) completed Social Determinant of Health (SDOH)  Questionnaire with patient/caregiver via telephone today.  Notified OPCM CM Michaela Tang RN of completion.      belkis denied any SDOH needs at this time.

## 2024-12-20 NOTE — PROGRESS NOTES
Outpatient Care Management  Initial Patient Assessment    Patient: Dariel Urbina  MRN: [id number]  Date of Service: 12/20/2024  Completed by: Michaela Tang RN  Referral Date: 12/16/2024  Date of Eligibility: 12/17/2024  Program:   High Risk  Status: Ongoing  Effective Dates: 12/20/2024 - present  Responsible Staff: Michaela Tang RN        Reason for Visit   Patient presents with    OPCM Enrollment Call    Nursing Assessment       Brief Summary:  Dariel Urbina was referred by Dr. Cody Carrington for left sided epistaxis. Patient qualifies for program based on risk score of 78.2%.   Active problem list, medical, surgical and social history reviewed. Active comorbidities include HTN, HLD, CHF, PAF, CAD s/p CABG and stent placement, mechanical aortic valve on coumadin, DM2, CKD4, gout, recurrent epistaxis, anemia. Areas of need identified by patient's spouse, Mari, include CKD4.     CM sent referral to Claudia Fraga YUNG for SDOH to be completed.    Next steps:   Follow up if received educational materials  Educate on S&S of CKD  Educate on low sodium diet  Educate on self care      Disability Status  Is the patient alert and oriented (person, place, time, and situation)?: Alert and oriented x 4  Hearing Difficulty or Deaf: yes  Hearing Management: other  Visual Difficulty or Blind: yes  Visual and Hearing Needs Conclusion: is hard of hearing, people talk louder to him, wears glasses sometimes but supposed to wear them all the time  Vision Management: Other  Difficulty Concentrating, Remembering or Making Decisions: no  Communication Difficulty: yes  Communication: difficulty understanding  Communication Management: Due to [place]  Eating/Swallowing Difficulty: yes  Eating/Swallowing: eating  Eating/Swallowing Management: Does not have a lot of teeth for chewing  Walking or Climbing Stairs Difficulty: yes  Walking or Climbing Stairs: stair climbing difficulty, requires equipment  Mobility Management:  uses a railing  Dressing/Bathing Difficulty: no  Toileting : Independent  Continence : Continence - Not a problem  Difficulty Managing Errands Independently: no  Equipment Currently Used at Home: shower chair; wheelchair; blood pressure machine  ADL Conclusion Statement: Patient is very independent with ADL's.  He is very [place] and people speak loudly to him.  He does not wear his glasses all the time like he is supposed to.  He has difficulty chewing because he does not have many teeth left, only four so he does not eat a lot of meats.  Service Animal Required: no  Change in Functional Status Since Onset of Current Illness/Injury: no        Spiritual Beliefs  Spiritual, Cultural Beliefs, Nondenominational Practices, Values that Affect Care: no      Social History     Socioeconomic History    Marital status:      Spouse name: Ameena    Number of children: 3   Occupational History    Occupation: retired   Tobacco Use    Smoking status: Former     Current packs/day: 0.00     Average packs/day: 1 pack/day for 20.0 years (20.0 ttl pk-yrs)     Types: Cigarettes     Start date: 1960     Quit date: 1980     Years since quittin.3     Passive exposure: Never    Smokeless tobacco: Never   Substance and Sexual Activity    Alcohol use: No    Drug use: No    Sexual activity: Not Currently     Partners: Female     Social Drivers of Health     Financial Resource Strain: Low Risk  (2024)    Overall Financial Resource Strain (CARDIA)     Difficulty of Paying Living Expenses: Not hard at all   Food Insecurity: No Food Insecurity (2024)    Hunger Vital Sign     Worried About Running Out of Food in the Last Year: Never true     Ran Out of Food in the Last Year: Never true   Transportation Needs: No Transportation Needs (2024)    TRANSPORTATION NEEDS     Transportation : No   Physical Activity: Inactive (2024)    Exercise Vital Sign     Days of Exercise per Week: 0 days     Minutes of Exercise per  Session: 0 min   Stress: No Stress Concern Present (12/13/2024)    [employer] [place] [employer] - Occupational Stress Questionnaire     Feeling of Stress : Not at all   Housing Stability: Low Risk  (12/13/2024)    Housing Stability Vital Sign     Unable to Pay for Housing in the Last Year: No     Homeless in the Last Year: No       Roles and Relationships  Primary Source of Support/Comfort: spouse  Name of Support/Comfort Primary Source: Ameena  Primary Roles/Responsibilities: retired  Secondary Source of Support/Comfort: child(sumit)  Name of Support/Comfort Secondary Source: Sandra      Advance Directives (For Healthcare)  Advance Directive  (If Adv Dir status is received, view document under Adv Dir in header or Chart Review Media tab): Patient does not have Advance Directive, requests information.  Patient Requests Assistance: Handouts provided        Patient Reported Insurance  Verified current insurance plan:: [employer] Advantage  [employer] benefits discussed:: [employer]; Silver Sneakers; Mail Order Pharmacy            12/20/2024     1:55 PM 6/25/2024    12:24 PM 4/5/2024     8:29 AM 7/26/2023     8:21 AM 4/25/2023     8:16 AM 2/6/2023     2:45 PM 10/19/2022     5:05 PM   Depression Patient Health Questionnaire   Over the last two weeks how often have you been bothered by little interest or pleasure in doing things Not at all Not at all Not at all Not at all Not at all Not at all Not at all   Over the last two weeks how often have you been bothered by feeling down, depressed or hopeless Not at all Not at all Not at all Not at all Not at all Not at all Not at all   PHQ-2 Total Score 0 0 0 0 0 0 0       Learning Assessment       12/20/2024 1422 [facility] (12/20/2024 - Present)   Created by Michaela Tang RN -  (Nurse) Status: Complete                 PRIMARY LEARNER     Primary Learner Name:  Mari Urbina, spouse DB - 12/20/2024 1422    Relationship:  Significant  Other DB - 12/20/2024 1422    Does the primary learner have any barriers to learning?:  No Barriers DB - 12/20/2024 1422    What is the preferred language of the primary learner?:  English DB - 12/20/2024 1422    Is an  required?:  No DB - 12/20/2024 1422    How does the primary learner prefer to learn new concepts?:  Listening, Reading DB - 12/20/2024 1422    How often do you need to have someone help you read instructions, pamphlets, or written material from your doctor or pharmacy?:  Never DB - 12/20/2024 1422        CO-LEARNER #1     No question answered        CO-LEARNER #2     No question answered        SPECIAL TOPICS     No question answered        ANSWERED BY:     No question answered        Comments         Edit History       Michaela Tang, RN -  (Nurse)   12/20/2024 1422

## 2024-12-20 NOTE — PROGRESS NOTES
C3 nurse spoke with Dariel Urbina's spouse, Ameena for a TCC post hospital discharge follow up call. The patient has a scheduled HOSFU appointment with David Delaney NP on 12/26/24 @ [id number].

## 2024-12-20 NOTE — LETTER
Dariel Urbina  [address] [address]    Dear Dariel Urbina,     Welcome to [facility] Outpatient Care Management Program. We are here to assist patients with multiple long-term (chronic) conditions who often need more personalized healthcare.    It was a pleasure talking with you today. My name is Kimberlee Tang RN. I look forward to working with you as your Care Manager. I will be contacting you by telephone routinely to help coordinate care and resolve issues.    My goal is to help you function at the healthiest and highest level possible. You can contact me directly at [phone].    As an [facility] patient with [employer], some of the services we provide, at no cost to you, include:     Development of an individualized care plan with a Registered Nurse   Connection with a   Assistance from a Community Health Worker  Connection with available resources and services    Coordinate communication among your care team members   Provide coaching and education  Help you understand your doctor's treatment plan  Help you obtain information about your insurance coverage.    All services provided by [facility]s Outpatient Care Managers and other care team members are coordinated with and communicated to your primary care team.      As part of your enrollment, you will be receiving education materials and more information about these services in your [facility] account, by phone, or through the mail. If you do not wish to participate or receive information, you can Opt Out by contacting our office at [phone].      Sincerely,    Kimberlee Tang RN  [facility]   Outpatient Care Management

## 2024-12-23 ENCOUNTER — LAB VISIT (OUTPATIENT)
Dept: LAB | Facility: HOSPITAL | Age: 84
End: 2024-12-23
Attending: INTERNAL MEDICINE
Payer: MEDICARE

## 2024-12-23 ENCOUNTER — ANTI-COAG VISIT (OUTPATIENT)
Dept: CARDIOLOGY | Facility: CLINIC | Age: 84
End: 2024-12-23
Payer: MEDICARE

## 2024-12-23 DIAGNOSIS — Z95.2 H/O MECHANICAL AORTIC VALVE REPLACEMENT: ICD-10-CM

## 2024-12-23 DIAGNOSIS — Z79.01 CHRONIC ANTICOAGULATION: ICD-10-CM

## 2024-12-23 DIAGNOSIS — Z79.01 CHRONIC ANTICOAGULATION: Primary | ICD-10-CM

## 2024-12-23 LAB
INR PPP: 2.4 (ref 0.8–1.2)
PROTHROMBIN TIME: 25.2 SEC (ref 9–12.5)

## 2024-12-23 PROCEDURE — 93793 ANTICOAG MGMT PT WARFARIN: CPT | Mod: S$GLB,,,

## 2024-12-23 PROCEDURE — 36415 COLL VENOUS BLD VENIPUNCTURE: CPT | Mod: HCNC | Performed by: INTERNAL MEDICINE

## 2024-12-23 PROCEDURE — 85610 PROTHROMBIN TIME: CPT | Mod: HCNC | Performed by: INTERNAL MEDICINE

## 2024-12-23 NOTE — PROGRESS NOTES
[facility]    2024 3:23 PM    Assessment/Plan:    Patient presents today with therapeutic INR.    Assessment of patient findings and chart review: Reviewed    Recommendation for patient's warfarin regimen: Continue current maintenance dose + pt can d/c lovenox    Recommend repeat INR in 1 week  _________________________________________________________________    Dariel Devon Urbina (84 y.o.) is followed by the [facility].    Anticoagulation Summary  As of 2024      INR goal:  2.0-2.5   TTR:  67.0% (12.3 y)   INR used for dosin.4 (2024)   Warfarin maintenance plan:  2.5 mg (5 mg x 0.5) every Wed; 5 mg (5 mg x 1) all other days   Weekly warfarin total:  32.5 mg   Plan last modified:  Laurie Patino, PharmD (10/14/2024)   Next INR check:  2024   Target end date:  --    Indications    Chronic anticoagulation [Z79.01]  H/O mechanical aortic valve replacement [Z95.2]                 Anticoagulation Episode Summary       INR check location:  Clinic Lab    Preferred lab:  --    Send INR reminders to:  [facility] COUMADIN MONITORING POOL    Comments:  [facility] IM - Internal Med Clinic only Mon - Thu // carpel tunnel release  - target low end of range          Anticoagulation Care Providers       Provider Role Specialty Phone number    Devon Langston Jr., MD [facility] Internal Medicine [phone]

## 2024-12-26 ENCOUNTER — OFFICE VISIT (OUTPATIENT)
Dept: INTERNAL MEDICINE | Facility: CLINIC | Age: 84
End: 2024-12-26
Payer: MEDICARE

## 2024-12-26 ENCOUNTER — LAB VISIT (OUTPATIENT)
Dept: LAB | Facility: HOSPITAL | Age: 84
End: 2024-12-26
Payer: MEDICARE

## 2024-12-26 VITALS
WEIGHT: 148.13 LBS | SYSTOLIC BLOOD PRESSURE: 137 MMHG | HEART RATE: 44 BPM | DIASTOLIC BLOOD PRESSURE: 83 MMHG | BODY MASS INDEX: 23.81 KG/M2 | OXYGEN SATURATION: 98 % | HEIGHT: 66 IN

## 2024-12-26 DIAGNOSIS — R04.0 RECURRENT EPISTAXIS: Primary | ICD-10-CM

## 2024-12-26 DIAGNOSIS — R04.0 RECURRENT EPISTAXIS: ICD-10-CM

## 2024-12-26 LAB
BASOPHILS # BLD AUTO: 0.02 K/UL (ref 0–0.2)
BASOPHILS NFR BLD: 0.5 % (ref 0–1.9)
DIFFERENTIAL METHOD BLD: ABNORMAL
EOSINOPHIL # BLD AUTO: 0.2 K/UL (ref 0–0.5)
EOSINOPHIL NFR BLD: 5 % (ref 0–8)
ERYTHROCYTE [DISTWIDTH] IN BLOOD BY AUTOMATED COUNT: 15.9 % (ref 11.5–14.5)
HCT VFR BLD AUTO: 26.1 % (ref 40–54)
HGB BLD-MCNC: 7.9 G/DL (ref 14–18)
IMM GRANULOCYTES # BLD AUTO: 0.03 K/UL (ref 0–0.04)
IMM GRANULOCYTES NFR BLD AUTO: 0.7 % (ref 0–0.5)
LYMPHOCYTES # BLD AUTO: 1.2 K/UL (ref 1–4.8)
LYMPHOCYTES NFR BLD: 27.3 % (ref 18–48)
MCH RBC QN AUTO: 29.9 PG (ref 27–31)
MCHC RBC AUTO-ENTMCNC: 30.3 G/DL (ref 32–36)
MCV RBC AUTO: 99 FL (ref 82–98)
MONOCYTES # BLD AUTO: 0.5 K/UL (ref 0.3–1)
MONOCYTES NFR BLD: 10.6 % (ref 4–15)
NEUTROPHILS # BLD AUTO: 2.5 K/UL (ref 1.8–7.7)
NEUTROPHILS NFR BLD: 55.9 % (ref 38–73)
NRBC BLD-RTO: 0 /100 WBC
PLATELET # BLD AUTO: 173 K/UL (ref 150–450)
PMV BLD AUTO: 11.4 FL (ref 9.2–12.9)
RBC # BLD AUTO: 2.64 M/UL (ref 4.6–6.2)
WBC # BLD AUTO: 4.44 K/UL (ref 3.9–12.7)

## 2024-12-26 PROCEDURE — 36415 COLL VENOUS BLD VENIPUNCTURE: CPT | Mod: HCNC | Performed by: NURSE PRACTITIONER

## 2024-12-26 PROCEDURE — 85025 COMPLETE CBC W/AUTO DIFF WBC: CPT | Mod: HCNC | Performed by: NURSE PRACTITIONER

## 2024-12-26 PROCEDURE — 99999 PR PBB SHADOW E&M-EST. PATIENT-LVL III: CPT | Mod: PBBFAC,HCNC,, | Performed by: NURSE PRACTITIONER

## 2024-12-26 NOTE — PROGRESS NOTES
"Transitional Care Note  Subjective:       Patient ID: Dariel Urbina is a 84 y.o. male.  Chief Complaint: Hospital Follow Up    Family and/or Caretaker present at visit?  Yes.  Diagnostic tests reviewed/disposition: No diagnosic tests pending after this hospitalization.  Disease/illness education: Recurrent epistaxis   Home health/community services discussion/referrals: Patient does not have home health established from hospital visit.  They do not need home health.  If needed, we will set up home health for the patient.   Establishment or re-establishment of referral orders for community resources: No other necessary community resources.   Discussion with other health care providers: No discussion with other health care providers necessary.     Dariel Urbina is a 84 y.o. M with HTN, HLD, combined CHF, paroxysmal A fib, CAD s/p CABG and stent placement,  h/o mechanical aortic valve replacement on warfarin, T2DM, stage IV CKD, gout, and recurrent epistaxis s/p bilateral SP ligation followed by bilateral embolization, cautery on the left septum x2 for persistent bleeding (11/2023 & 12/2023), left AEA ligation (2/2024) and repeat embolization in 08/2024, presenting for hospital follow up. Patient admitted to observation for recurrent epistaxis which resulted in need for transfusion. Hgb on discharge up to 8.4.  He reports having nose bleeds every day since leaving the hospital. He is able to stop the bleeds with afrin and a nose clamp, but reports they can last for hours. Patient feels well today and denies chest pain and fatigue. He endorses chornic shortness of breath that is unchanged and not any worse from prior.      Patient with ENT follow up tomorrow.           ED discharge summary from 12/1824:    ".......HPI:   Dariel Urbina is a 84 y.o. M with HTN, HLD, combined CHF, paroxysmal A fib, CAD s/p CABG and stent placement,  h/o mechanical aortic valve replacement on warfarin, T2DM, stage IV CKD, " gout, and recurrent epistaxis s/p bilateral SP ligation followed by bilateral embolization, cautery on the left septum x2 for persistent bleeding (11/2023 & 12/2023), left AEA ligation (2/2024) and repeat embolization in 08/2024 presenting with recurrent epistaxis. The patient reports his most recent episode of epistaxis started ~3-4 days ago and has been intermittent with persistent worsening and did not stop with persistent pressure and afrin. Pt endorses associated dizziness, lightheadedness, palpitations, and SOB but otherwise denies fever, chills, chest pain, abdominal pain, N/V/D, dysuria, leg swelling or pain, HA, or syncope.         In the ED: Hypertensive to 170s/70s, o/w VSSAF. CBC with Hgb 6.0 (from baseline ~8), no leukocytosis. PT 24/INR 2.4. CMP with Cr 2.6 (from baseline 2.2-2.4). ED provider attempted nasal packing without significant improvement. The patient was given 1u pRBCs, afrin, and NS bolus. ENT consulted & patient was placed in observation for further management.      * No surgery found *       Hospital Course:   Mr. Urbina was admitted to hospital medicine for recurrent epistaxis and symptomatic anemia. Hgb 6.0 (from baseline of ~8) on admission, which improved to 8.3 s/p 2u pRBCs. ENT consulted and placed bilateral merocel packing with fibrillar around the packing. Pt was started on topical mupirocin and PO doxycycline while packing was in place. IR consulted for consideration of inpatient embolization, which was deferred given hemostasis with packing in place. Therapeutic lovenox initiated on 12/15 with active bridging to therapeutic coumadin underway. R nares packing was removed on 12/16 without active re-bleeding. L nares packing removed on 12/17. Pt with 4-5 episodes of loose stools on evening of 12/17. C. Diff ordered and later cancelled given complete resolution of diarrhea with peptobismol and probiotiocs. Pt had a mild, brief episode of bleeding from the L nares on the AM on  "12/18, which was quickly controlled with Afrin and pressure. Repeat Hgb following re-bleed improved from 7.9 -> 8.4.      Pt was seen and evaluated by me this morning, reports feeling well, and is very eager to discharge home. All questions were answered. Patient acknowledged understanding of discharge instructions and feels safe to discharge home. Patient was discharged on 12/18/2024 in stable condition with ENT follow-up and acute care at home services. Education regarding condition provided and return precautions given.      *Of note, the patient was instructed to continue lovenox 60 mg daily, as well as warfarin 5 mg daily, (per pharmacy recs) until repeat INR on Friday 12/20. Outpatient coumadin clinic notified of plan to repeat INR and will continue anticoagulation recommendations from there........."        Review of Systems   HENT:  Positive for nosebleeds.    Respiratory:  Positive for shortness of breath.    Gastrointestinal:  Negative for diarrhea.   All other systems reviewed and are negative.      Objective:      Physical Exam  Constitutional:       General: He is not in acute distress.     Appearance: Normal appearance. He is not ill-appearing, toxic-appearing or diaphoretic.   HENT:      Head: Normocephalic and atraumatic.      Nose:      Comments: Coagulated blood noted in nares bilaterally   Cardiovascular:      Rate and Rhythm: Regular rhythm. Bradycardia present.   Pulmonary:      Effort: Pulmonary effort is normal.      Breath sounds: Normal breath sounds.   Abdominal:      General: Abdomen is flat.   Skin:     General: Skin is warm and dry.   Neurological:      Mental Status: He is alert and oriented to person, place, and time.   Psychiatric:         Mood and Affect: Mood normal.         Behavior: Behavior normal.         Assessment:       1. Recurrent epistaxis        Plan:       Dariel was seen today for hospital follow up.    Diagnoses and all orders for this visit:    Recurrent epistaxis; " chronic problem with recent exacerbation that required transfusion. Continue afrin and nose clamp prn epistaxis. Follow up with ENT tomorrow. Will obtain CBC today to evaluate H&H, given daily nosebleeds since discharge. Patient given strict ED precautions.   -     CBC W/ AUTO DIFFERENTIAL; Future    RTC prn and if symptoms worsen or do not improve. Next appointment with PCP on 1/10/25

## 2024-12-27 ENCOUNTER — TELEPHONE (OUTPATIENT)
Dept: INTERNAL MEDICINE | Facility: CLINIC | Age: 84
End: 2024-12-27
Payer: MEDICARE

## 2024-12-27 ENCOUNTER — OFFICE VISIT (OUTPATIENT)
Dept: OTOLARYNGOLOGY | Facility: CLINIC | Age: 84
End: 2024-12-27
Payer: MEDICARE

## 2024-12-27 ENCOUNTER — OUTPATIENT CASE MANAGEMENT (OUTPATIENT)
Dept: ADMINISTRATIVE | Facility: OTHER | Age: 84
End: 2024-12-27
Payer: MEDICARE

## 2024-12-27 VITALS — SYSTOLIC BLOOD PRESSURE: 144 MMHG | HEART RATE: 61 BPM | DIASTOLIC BLOOD PRESSURE: 63 MMHG

## 2024-12-27 DIAGNOSIS — R04.0 EPISTAXIS: Primary | ICD-10-CM

## 2024-12-27 DIAGNOSIS — J34.2 NASAL SEPTAL DEVIATION: ICD-10-CM

## 2024-12-27 PROCEDURE — 99999 PR PBB SHADOW E&M-EST. PATIENT-LVL III: CPT | Mod: PBBFAC,HCNC,, | Performed by: STUDENT IN AN ORGANIZED HEALTH CARE EDUCATION/TRAINING PROGRAM

## 2024-12-27 NOTE — H&P (VIEW-ONLY)
Subjective:      Dariel is a 84 y.o. male who comes for follow-up of  epistaxis . His last visit of me was on 10/25/24. Recently discharged from hospital for bleeding. Bilateral packs placed and were able to be removed. Since discharge notes continues bleeding episodes. Hgb recenly 7.9, down from 8.4.  Reports bleeding started this morning.  Mostly in the left side.  Filling fatigued.    He previously undergone bilateral SP ligation followed by bilateral embolization, cautery on the left septum for persistent bleeding on 11/03/2023 and again recently on 12/18/23. He recently underwent left AEA ligation on 2/28/24 and recently underwent nasal endoscopy and control of epistaxis on 6/18/24.  He then had repeat embolization on 08/06/24.     His current sinus regime consists of: Saline.     The assessment of quality and severity of symptoms as measured by the SNOT-22 score is deferred.    The patient's medications, allergies, past medical, surgical, social and family histories were reviewed and updated as appropriate.    ROS     A detailed review of systems was obtained with pertinent positives as per the above HPI, and otherwise negative.        Objective:     BP (!) 144/63 (BP Location: Right arm, Patient Position: Sitting)   Pulse 61        Constitutional:   Vital signs are normal. He appears well-developed and well-nourished.     Head:  Normocephalic and atraumatic.     Ears:  Hearing normal to normal and whispered voice; external ear normal without scars, lesions, or masses; ear canal, tympanic membrane, and middle ear normal..   Right Ear: No swelling. Tympanic membrane is not perforated and not bulging. No middle ear effusion.   Left Ear: No swelling. Tympanic membrane is not perforated and not bulging.  No middle ear effusion.     Nose:  Nose normal including turbinates, nasal mucosa, sinuses and nasal septum. No epistaxis.         Mouth/Throat  Oropharynx clear and moist without lesions or asymmetry  and normal uvula midline. Normal dentition. No tonsillar abscesses. Tonsillar exudate.      Neck:  Neck normal without thyromegaly masses, asymmetry, normal tracheal structure, crepitus, and tenderness, thyroid normal, trachea normal, phonation normal, full range of motion with neck supple and no adenopathy. No stridor present.        Head (right side): No submental adenopathy present.        Head (left side): No submental adenopathy present.     He has no cervical adenopathy.     Pulmonary/Chest:   No stridor.     Procedure    Nasal Endoscopy:  12/27/2024    The use of diagnostic nasal endoscopy was considered medically necessary for the evaluation and visualization of the nasal anatomy for symptoms suggestive of nasal or sinus origin. Physical examination (including a nasal speculum evaluation) did not provide sufficient clinical information to establish a diagnosis, or symptoms did not improve or worsened following treatment.     The nasal cavity was decongested with topical 1% phenylephrine and anesthetized with 4% lidocaine.  A rigid 0-degree endoscope was introduced into the nasal cavity.    The patient was seated in the examination chair. After discussion of risks and benefits, a nasal endoscope was inserted into the nose the endoscope was passed along the left nasal floor to the nasopharynx. It was then passed between the middle and superior meatus, nasal turbinates, nasal septum, nasopharynx and sphenoethmoid region. The nasal endoscope was withdrawn and there was no complications. An identical procedure was performed on the right side. I was present for the entire procedure.The patient tolerated the above procedure well. The findings of this procedure can be found in the dictated note from 12/27/2024 visit.      Very very dry nasal cavity bilaterally.  Large right-sided blood clot removed from the nasal cavity and maxillary sinus.  Stable appearing septal perforation.  No active bleeding however large  "crust along the left septum likely spot for rebleeding episode.  Crusting was left untouched and a dissolvable packing was placed in the left nasal cavity.    Data Reviewed    WBC (K/uL)   Date Value   12/26/2024 4.44     Eosinophil % (%)   Date Value   12/26/2024 5.0     Eos # (K/uL)   Date Value   12/26/2024 0.2     Platelets (K/uL)   Date Value   12/26/2024 173     Glucose (mg/dL)   Date Value   12/18/2024 111 (H)     No results found for: "IGE"    No sinus imaging available.    Assessment:     1. Epistaxis    2. Nasal septal deviation        Plan:     - plan for nasal endoscopy cautery of his left septum next week.  - Posicept nasal packing placement in the left nasal cavity to assist with the hemostasis over the weekend.  - continue nasal saline and Afrin p.r.n.  - plans were repeat CBC morning of surgery.  - Young's procedure was discussed the patient that has not interested at this time.    Jono Russell MD     "

## 2024-12-27 NOTE — TELEPHONE ENCOUNTER
Called patient to discuss CBC results. Drop in Hgb to 7.9, from 8.4, over the course of 9 days. Patient with ENT follow up today. Recommended patient keep a low threshold to return to ER if his nosebleeds continue or worsen over the next couple of days. Patient expressed understanding.

## 2024-12-27 NOTE — LETTER
Dariel Urbina  [address] [address]      Dear Dariel Urbina,     I am your nurse with [facility] Outpatient Care Management Department. I was unsuccessful in reaching you today. At your earliest convenience, I would like to discuss your healthcare progress.      Please contact me at [phone] from 8:00AM to 4:30 PM on Monday thru Friday.     As you know, [facility] is a program offered to you through [facility] where a nurse is available 24/7 to answer questions or provide medical advice, their number is [phone].    Thanks,    Kimberlee Tang RN  Outpatient Care Management

## 2024-12-29 ENCOUNTER — ANESTHESIA EVENT (OUTPATIENT)
Dept: SURGERY | Facility: HOSPITAL | Age: 84
End: 2024-12-29
Payer: MEDICARE

## 2024-12-29 NOTE — ANESTHESIA PREPROCEDURE EVALUATION
[facility]  Anesthesia Pre-Operative Evaluation         Patient Name: Dariel Urbina  YOB: 1940  MRN: [id number]    SUBJECTIVE:     Pre-operative evaluation for Procedure(s) (LRB):  CONTROL OF EPISTAXIS, POSTERIOR, USING NASAL PACKING OR CAUTERIZATION (Bilateral)     12/29/2024    Dariel Urbina is a 84 y.o. male w/ a significant PMHx of HTN, HLD, combined CHF, paroxysmal A fib, CAD s/p CABG and stent placement, h/o mechanical aortic valve replacement on warfarin, T2DM, stage IV CKD, gout, and recurrent epistaxis s/p bilateral SP ligation followed by bilateral embolization, cautery on the left septum x2 for persistent bleeding (11/2023 & 12/2023), left AEA ligation (2/2024) and repeat embolization in 08/2024, now presenting for repeat nasal cauterization.    Patient now presents for the above procedure(s).    Results for orders placed during the hospital encounter of 09/26/24    Echo    Interpretation Summary    Left Ventricle: The left ventricle is normal in size. Mildly increased wall thickness. Mild septal thickening. There is concentric remodeling. Regional wall motion abnormalities present. Septal motion is abnormal. There is mildly reduced systolic function with a visually estimated ejection fraction of 40 - 45%. Ejection fraction by visual approximation is 43%. Grade III diastolic dysfunction.    Right Ventricle: Mild right ventricular enlargement. Wall thickness is normal. Systolic function is borderline low.    Left Atrium: Left atrium is severely dilated.    Right Atrium: Right atrium is moderately dilated.    Aortic Valve: There is a mechanical valve in the aortic position. There is moderate stenosis. Aortic valve area by VTI is 1.30 cm2. Aortic valve peak velocity is 2.84 m/s. Mean gradient is 20 mmHg. The dimensionless index is 0.50.    Mitral Valve: There is moderate bileaflet sclerosis. There is mild mitral annular calcification present. There is moderate to  severe regurgitation.    Tricuspid Valve: There is moderate to severe regurgitation.    Pulmonic Valve: There is mild regurgitation.    Pulmonary Artery: There is severe pulmonary hypertension. The estimated pulmonary artery systolic pressure is 79 mmHg.    IVC/SVC: Intermediate venous pressure at 8 mmHg.       LDA: None documented.       Prev airway:        Mask Ventilation:  Easy mask    Attempts:  1    Attempted By:  Resident anesthesiologist    Method of Intubation:  Video laryngoscopy    Blade:  Osorio 3    Laryngeal View Grade: Grade I - full view of cords      Difficult Airway Encountered?: No      Complications:  None    Airway Device:  Oral endotracheal tube    Airway Device Size:  7.5     Drips: None documented.      Patient Active Problem List   Diagnosis    Chronic anticoagulation    Hyperlipidemia associated with type 2 diabetes mellitus    History of colon resection    Renovascular hypertension    History of gout    Type 2 diabetes mellitus with stage 4 chronic kidney disease, without long-term current use of insulin    H/O mechanical aortic valve replacement    Atherosclerosis of native artery of extremity with intermittent claudication    Stage 3b chronic kidney disease    Type 2 diabetes mellitus with diabetic peripheral angiopathy without gangrene    Hyperkalemia    Acidosis    Epistaxis    Gastrointestinal hemorrhage associated with intestinal diverticulosis    Hx of colonic polyp    Hypertension associated with diabetes    Bilateral carpal tunnel syndrome    Numbness and tingling in both hands    Severe carpal tunnel syndrome of right wrist    Anemia    Acute blood loss anemia    Recurrent epistaxis    Supratherapeutic INR    JIM (acute kidney injury)    Coronary artery disease involving native coronary artery of native heart without angina pectoris    Acute exacerbation of CHF (congestive heart failure)    Paroxysmal atrial fibrillation    Mitral insufficiency    Dysphagia    Secondary  hyperparathyroidism of renal origin    Dysphonia    ACP (advance care planning)    Chronic kidney disease, stage 4 (severe)    Anemia of chronic renal failure    Hyperuricemia    Nephrolithiasis       Review of patient's allergies indicates:   Allergen Reactions    Lisinopril     Losartan      Intolerance- elevates potassium level       Current Inpatient Medications:      No current facility-administered medications on file prior to encounter.     Current Outpatient Medications on File Prior to Encounter   Medication Sig Dispense Refill    acetaminophen (TYLENOL) 500 MG tablet Take 500 mg by mouth daily as needed for Pain.      albuterol (VENTOLIN HFA) 90 mcg/actuation inhaler Inhale 2 puffs into the lungs every 6 (six) hours as needed for Wheezing. Rescue 18 g 3    allopurinoL (ZYLOPRIM) 100 MG tablet Take 1 tablet (100 mg total) by mouth once daily. 90 tablet 3    bumetanide (BUMEX) 1 MG tablet Take 2 tablets (2 mg total) by mouth every other day. 90 tablet 3    ferrous gluconate (FERGON) 324 MG tablet TAKE 1 TABLET EVERY DAY WITH BREAKFAST 90 tablet 3    isosorbide mononitrate (IMDUR) 60 MG 24 hr tablet Take 1 tablet (60 mg total) by mouth every evening. 90 tablet 3    Lactobacillus rhamnosus GG (CULTURELLE) 10 billion cell capsule Take 1 capsule by mouth 2 (two) times daily. 60 capsule 0    omega-3 fatty acids/fish oil (FISH OIL-OMEGA-3 FATTY ACIDS) 300-1,000 mg capsule Take 1 capsule by mouth once daily.      oxymetazoline (AFRIN) 0.05 % nasal spray 2 sprays by Nasal route as needed (nose bleeds). 30 mL 0    rosuvastatin (CRESTOR) 40 MG Tab TAKE 1 TABLET EVERY EVENING. 90 tablet 3    sodium chloride (SALINE NASAL) 0.65 % nasal spray Use 2 sprays by Nasal route every 4 hours. Apply into nasal cavities daily with nightly application of vaseline/aquaphor to anterior nares. Keep head of bed elevated. 60 mL 3    traZODone (DESYREL) 50 MG tablet Take 1 tablet (50 mg total) by mouth every evening. (Patient taking  differently: Take 50 mg by mouth every evening. As needed) 90 tablet 2    warfarin (COUMADIN) 5 MG tablet TAKE 1/2 TABLET (2.5MG) SATURDAY, THEN TAKE 1 TABLET (5MG) ALL OTHER DAYS OR AS INSTRUCTED BY COUMADIN CLINIC 90 tablet 3       Past Surgical History:   Procedure Laterality Date    BACK SURGERY      CARDIAC CATHETERIZATION      CARDIAC VALVE REPLACEMENT      CARDIAC VALVE SURGERY      CARPAL TUNNEL RELEASE Right 05/19/2020    Procedure: RELEASE, CARPAL TUNNEL;  Surgeon: Rupesh Norris Jr., MD;  Location: [facility];  Service: Plastics;  Laterality: Right;    CATARACT EXTRACTION Left 11/13/2022        COLON SURGERY      COLONOSCOPY N/A 03/31/2017    Procedure: COLONOSCOPY;  Surgeon: Bruno Raymond MD;  Location: [facility] (4TH FLR);  Service: Endoscopy;  Laterality: N/A;  Patient's wife requesting date.    COLONOSCOPY N/A 04/03/2018    Procedure: COLONOSCOPY;  Surgeon: Bonifacio Pelletier MD;  Location: [facility] ENDO (2ND FLR);  Service: Endoscopy;  Laterality: N/A;    COLONOSCOPY N/A 08/13/2018    Procedure: COLONOSCOPY;  Surgeon: Kam Barba MD;  Location: [facility] ENDO (2ND FLR);  Service: Endoscopy;  Laterality: N/A;  2nd floor: PA pressure 49; hx of moderate-severe valve disease     per Coumadin clinic-Patient can hold 5 days with lovenox bridge       ok to schedule per [facility]    CONTROL OF EPISTAXIS, POSTERIOR, USING NASAL PACKING OR CAUTERIZATION Bilateral 6/18/2024    Procedure: CONTROL OF EPISTAXIS, POSTERIOR, USING NASAL PACKING OR CAUTERIZATION;  Surgeon: Jono Russell MD;  Location: [facility] OR 2ND FLR;  Service: ENT;  Laterality: Bilateral;    CONTROL OF EPISTAXIS, POSTERIOR, USING NASAL PACKING OR CAUTERIZATION Bilateral 8/8/2024    Procedure: CONTROL OF EPISTAXIS, POSTERIOR, USING NASAL PACKING OR CAUTERIZATION;  Surgeon: Jono Russell MD;  Location: [facility] OR 2ND FLR;  Service: ENT;  Laterality: Bilateral;  suction bovie, nasopore, endoscopes    CORONARY ANGIOGRAPHY N/A 10/04/2021     Procedure: Left heart cath +/- peripheral angiogram;  Surgeon: Jose Ruiz MD;  Location: [facility] CATH LAB;  Service: Cardiology;  Laterality: N/A;    CORONARY ANGIOGRAPHY N/A 3/2/2023    Procedure: Angiogram, Coronary;  Surgeon: Devon Garnica MD;  Location: [facility] CATH LAB;  Service: Cardiology;  Laterality: N/A;    CORONARY ANGIOPLASTY      CORONARY ARTERY BYPASS GRAFT      CORONARY BYPASS GRAFT ANGIOGRAPHY  10/04/2021    Procedure: Bypass graft study;  Surgeon: Jose Ruiz MD;  Location: [facility] CATH LAB;  Service: Cardiology;;    CORONARY BYPASS GRAFT ANGIOGRAPHY  3/2/2023    Procedure: Bypass graft study;  Surgeon: Devon Garnica MD;  Location: [facility] CATH LAB;  Service: Cardiology;;    ECHOCARDIOGRAM,TRANSESOPHAGEAL N/A 4/14/2023    Procedure: Transesophageal echo (KIRSTEN) intra-procedure log documentation;  Surgeon: [facility] Diagnostic Provider;  Location: [facility] EP LAB;  Service: Cardiology;  Laterality: N/A;    ENDOSCOPIC NASAL CAUTERIZATION Bilateral 11/3/2023    Procedure: CAUTERIZATION, NOSE, ENDOSCOPIC;  Surgeon: Jono Russell MD;  Location: [facility] OR [facility] FLR;  Service: ENT;  Laterality: Bilateral;    ENDOSCOPIC NASAL CAUTERIZATION N/A 12/18/2023    Procedure: CAUTERIZATION, NOSE, ENDOSCOPIC;  Surgeon: Jono Russell MD;  Location: [facility] OR 2ND FLR;  Service: ENT;  Laterality: N/A;    EYE SURGERY      FESS, WITH IMAGING GUIDANCE Left 2/28/2024    Procedure: FESS, WITH IMAGING GUIDANCE;  Surgeon: Jono Russell MD;  Location: [facility] OR [facility] FLR;  Service: ENT;  Laterality: Left;  Scan loaded ENT [employer]. CL    FLUOROSCOPY Bilateral 10/29/2024    Procedure: Fluoroscopy;  Surgeon: Sameer Espitia MD;  Location: [facility] CATH LAB/EP;  Service: Cardiology;  Laterality: Bilateral;  fluoroscopy of the mechanical aortic valve    FUNCTIONAL ENDOSCOPIC SINUS SURGERY (FESS) Bilateral 6/14/2023    Procedure: FESS (FUNCTIONAL ENDOSCOPIC SINUS SURGERY);  Surgeon: Jono Russell MD;  Location: [facility] OR 2ND FLR;  Service:  ENT;  Laterality: Bilateral;    HERNIA REPAIR      INTRAOCULAR PROSTHESES INSERTION Left 11/13/2022    Procedure: INSERTION, IOL PROSTHESIS;  Surgeon: Alia Mckeon MD;  Location: [facility] OR 1ST FLR;  Service: Ophthalmology;  Laterality: Left;    INTRAOCULAR PROSTHESES INSERTION Right 12/04/2022    Procedure: INSERTION, IOL PROSTHESIS;  Surgeon: Alia Mckeon MD;  Location: [facility] OR 1ST FLR;  Service: Ophthalmology;  Laterality: Right;    LIGATION, ARTERY, ETHMOIDAL Left 2/28/2024    Procedure: LIGATION, ARTERY, ETHMOIDAL;  Surgeon: Jono Russell MD;  Location: [facility] OR 2ND FLR;  Service: ENT;  Laterality: Left;    LIGATION, ARTERY, SPHENOPALATINE, ENDOSCOPIC Bilateral 6/14/2023    Procedure: LIGATION, ARTERY, SPHENOPALATINE, ENDOSCOPIC;  Surgeon: Jono Russell MD;  Location: [facility] OR 2ND FLR;  Service: ENT;  Laterality: Bilateral;    NASAL ENDOSCOPY N/A 8/8/2024    Procedure: ENDOSCOPY, NOSE;  Surgeon: Jono Russell MD;  Location: [facility] OR 2ND FLR;  Service: ENT;  Laterality: N/A;    PHACOEMULSIFICATION OF CATARACT Left 11/13/2022    Procedure: PHACOEMULSIFICATION, CATARACT;  Surgeon: Alia Mckeon MD;  [facility];  Service: Ophthalmology;  Laterality: Left;    PHACOEMULSIFICATION OF CATARACT Right 12/04/2022    Procedure: PHACOEMULSIFICATION, CATARACT;  Surgeon: Alia Mckeon MD;  [facility];  Service: Ophthalmology;  Laterality: Right;    SPINE SURGERY      TRANSESOPHAGEAL ECHOCARDIOGRAPHY N/A 3/22/2023    Procedure: ECHOCARDIOGRAM, TRANSESOPHAGEAL;  Surgeon: Thuan Diagnostic Provider;  Location: [facility] EP LAB;  Service: Anesthesiology;  Laterality: N/A;    TRANSESOPHAGEAL ECHOCARDIOGRAPHY N/A 4/11/2023    Procedure: ECHOCARDIOGRAM, TRANSESOPHAGEAL;  Surgeon: Zenia Diagnostic Provider;  Location: [facility] EP LAB;  Service: Anesthesiology;  Laterality: N/A;    VASECTOMY         Social History     Socioeconomic History    Marital status:      Spouse name: Ameena     Number of children: 3   Occupational History    Occupation: retired   Tobacco Use    Smoking status: Former     Current packs/day: 0.00     Average packs/day: 1 pack/day for 20.0 years (20.0 ttl pk-yrs)     Types: Cigarettes     Start date: 1960     Quit date: 1980     Years since quittin.3     Passive exposure: Never    Smokeless tobacco: Never   Substance and Sexual Activity    Alcohol use: No    Drug use: No    Sexual activity: Not Currently     Partners: Female     Social Drivers of Health     Financial Resource Strain: Low Risk  (2024)    Overall Financial Resource Strain (CARDIA)     Difficulty of Paying Living Expenses: Not hard at all   Food Insecurity: No Food Insecurity (2024)    Hunger Vital Sign     Worried About Running Out of Food in the Last Year: Never true     Ran Out of Food in the Last Year: Never true   Transportation Needs: No Transportation Needs (2024)    PRAPARE - Transportation     Lack of Transportation (Medical): No     Lack of Transportation (Non-Medical): No   Physical Activity: Inactive (2024)    Exercise Vital Sign     Days of Exercise per Week: 0 days     Minutes of Exercise per Session: 0 min   Stress: No Stress Concern Present (2024)    [employer] [place] [employer] - Occupational Stress Questionnaire     Feeling of Stress : Only a little   Housing Stability: Low Risk  (2024)    Housing Stability Vital Sign     Unable to Pay for Housing in the Last Year: No     Homeless in the Last Year: No       OBJECTIVE:     Vital Signs Range (Last 24H):         Significant Labs:  Lab Results   Component Value Date    WBC 4.44 2024    HGB 7.9 (L) 2024    HCT 26.1 (L) 2024     2024    CHOL 96 (L) 2024    TRIG 144 2024    HDL 31 (L) 2024    ALT 11 2024    AST 19 2024     2024    K 4.0 2024     2024    CREATININE 2.1 (H) 2024    BUN 44 (H)  12/18/2024    CO2 20 (L) 12/18/2024    TSH 3.705 09/29/2022    PSA 0.35 07/27/2012    INR 2.4 (H) 12/23/2024    HGBA1C 5.1 12/13/2024       Diagnostic Studies: No relevant studies.    EKG:   Results for orders placed or performed in visit on 12/13/24   EKG 12-lead    Collection Time: 12/13/24 12:30 PM   Result Value Ref Range    QRS Duration 154 ms    [facility] QTC Calculation 474 ms    Narrative    Test Reason :    Vent. Rate :  64 BPM     Atrial Rate : 312 BPM     P-R Int :    ms          QRS Dur : 154 ms      QT Int : 460 ms       P-R-T Axes :     17  90 degrees    QTcB Int : 474 ms    Atrial fibrillation  Nonspecific intraventricular block  Abnormal ECG  When compared with ECG of 26-Sep-2024 22:58,  Vent. rate has increased by  21 bpm  T wave inversion no longer evident in Lateral leads  Confirmed by Edi Owen ([id number]) on 12/13/2024 1:48:12 PM    Referred By:            Confirmed By: Edi Owen       2D ECHO:  TTE:  Results for orders placed or performed during the hospital encounter of 09/26/24   Echo   Result Value Ref Range    LV Diastolic Volume 91.02 mL    Echo EF Estimated 30 %    LV Systolic Volume 63.88 mL    IVS 1.14 (A) 0.6 - 1.1 cm    LVIDd 4.47 3.5 - 6.0 cm    LVIDs 3.85 2.1 - 4.0 cm    LVOT diameter 1.82 cm    PW 1.07 0.6 - 1.1 cm    AV LVOT peak gradient 12 mmHg    LVOT mn grad 8 mmHg    LVOT peak lydia 1.70 m/s    LVOT peak VTI 52.86 cm    RV- erwin basal diam 4.7 cm    RV S' 7.35 cm/s    LA size 5.69 cm    Left Atrium Major Axis 7.15 cm    Left Atrium Minor Axis 7.16 cm    LA Vol (MOD) 178.84 mL    RA Major Axis 6.58 cm    RA Area 26.2 cm2    AV valve area 1.30 cm2    AV area by cont VTI 1.3 cm2    AV peak gradient 32 mmHg    AV mean gradient 20 mmHg    Ao peak lydia 2.84 m/s    Ao .67 cm    MV Peak A Lydia 0.47 m/s    E wave deceleration time 266.02 ms    MV Peak E Lydia 1.13 m/s    E/A ratio 2.40     LV LATERAL E/E' RATIO 12.56     LV SEPTAL E/E' RATIO 18.83     TDI LATERAL 0.09 m/s    TDI SEPTAL  0.06 m/s    TV peak gradient 70 mmHg    TR Max Ashkan 4.20 m/s    Ascending aorta 3.05 cm    STJ 2.36 cm    IVC diameter 2.21 cm    Sinus 3.29 cm    LA WIDTH 7.06 cm    RA Width 4.05 cm    TAPSE 1.88 cm    BSA 1.78 m2    LVOT stroke volume 137.45 cm3    LV Systolic Volume Index 36.3 mL/m2    LV Diastolic Volume Index 51.72 mL/m2    LVOT area 2.6 cm2    FS 14 (A) 28 - 44 %    Left Ventricle Relative Wall Thickness 0.48 cm    LV mass 174.29 g    LV Mass Index 99 g/m2    E/E' ratio 15.07 m/s    MARIA INES 138.8 mL/m2    LA Vol 244.31 cm3    MARIA INES (MOD) 101.6 mL/m2    AV Velocity Ratio 0.60     AV index (prosthetic) 0.50     MATIAS by Velocity Ratio 1.56 cm²    Triscuspid Valve Regurgitation Peak Gradient 71 mmHg    Mean e' 0.08 m/s    ZLVIDS 1.95     ZLVIDD -0.85     EF 43 %    TV resting pulmonary artery pressure 79 mmHg    RV TB RVSP 12 mmHg    Est. RA pres 8 mmHg    Narrative      Left Ventricle: The left ventricle is normal in size. Mildly increased   wall thickness. Mild septal thickening. There is concentric remodeling.   Regional wall motion abnormalities present. Septal motion is abnormal.   There is mildly reduced systolic function with a visually estimated   ejection fraction of 40 - 45%. Ejection fraction by visual approximation   is 43%. Grade III diastolic dysfunction.    Right Ventricle: Mild right ventricular enlargement. Wall thickness is   normal. Systolic function is borderline low.    Left Atrium: Left atrium is severely dilated.    Right Atrium: Right atrium is moderately dilated.    Aortic Valve: There is a mechanical valve in the aortic position. There   is moderate stenosis. Aortic valve area by VTI is 1.30 cm2. Aortic valve   peak velocity is 2.84 m/s. Mean gradient is 20 mmHg. The dimensionless   index is 0.50.    Mitral Valve: There is moderate bileaflet sclerosis. There is mild   mitral annular calcification present. There is moderate to severe   regurgitation.    Tricuspid Valve: There is moderate to  severe regurgitation.    Pulmonic Valve: There is mild regurgitation.    Pulmonary Artery: There is severe pulmonary hypertension. The estimated   pulmonary artery systolic pressure is 79 mmHg.    IVC/SVC: Intermediate venous pressure at 8 mmHg.         KIRSTEN:  Results for orders placed or performed during the hospital encounter of 04/11/23   Transesophageal echo (KIRSTEN)   Result Value Ref Range    BSA 1.84 m2    EF 60 %    Narrative    · The left ventricle is normal in size with normal systolic function.The   estimated ejection fraction is 60%.  · Normal right ventricular size with normal right ventricular systolic   function.  · Moderate mitral regurgitation.  · There is a mechanical aortic valve present. There is no aortic   insufficiency present.  · Plaque present in the transverse aorta.          ASSESSMENT/PLAN:           Pre-op Assessment    I have reviewed the Patient Summary Reports.     I have reviewed the Nursing Notes. I have reviewed the NPO Status.   I have reviewed the Medications.     Review of Systems  Anesthesia Hx:  No problems with previous Anesthesia   History of prior surgery of interest to airway management or planning:          Denies Family Hx of Anesthesia complications.    Denies Personal Hx of Anesthesia complications.                    Hematology/Oncology:       -- Anemia:                                  Cardiovascular:     Hypertension Valvular problems/Murmurs Past MI CAD    Dysrhythmias atrial fibrillation  CHF             Coronary Artery Disease:          Hx of Myocardial Infarction     Congestive Heart Failure (CHF)                Hypertension     Atrial Fibrillation     Pulmonary:    Denies COPD.  Denies Asthma.                    Renal/:  Chronic Renal Disease        Kidney Function/Disease             Hepatic/GI:     GERD         Gerd          Neurological:    Denies CVA. Neuromuscular Disease,   Denies Seizures.                              Neuromuscular Disease    Endocrine:  Diabetes, type 2    Diabetes                          Physical Exam  General: Cooperative, Alert, Oriented and Cachexia    Airway:  Mallampati: II   Mouth Opening: Normal  TM Distance: Normal  Tongue: Normal  Neck ROM: Normal ROM  Oropharynx: Active Airway Bleeding    Dental:  Dentures    Chest/Lungs:  Normal Respiratory Rate        Anesthesia Plan  Type of Anesthesia, risks & benefits discussed:    Anesthesia Type: Gen ETT  Intra-op Monitoring Plan: Standard ASA Monitors  Post Op Pain Control Plan: multimodal analgesia and IV/PO Opioids PRN  Induction:  IV  Airway Plan: Direct and Video, Post-Induction  Informed Consent: Informed consent signed with the Patient and all parties understand the risks and agree with anesthesia plan.  All questions answered.   ASA Score: 3  Day of Surgery Review of History & Physical: H&P Update referred to the surgeon/provider.    Ready For Surgery From Anesthesia Perspective.     .

## 2024-12-30 ENCOUNTER — ANESTHESIA (OUTPATIENT)
Dept: SURGERY | Facility: HOSPITAL | Age: 84
End: 2024-12-30
Payer: MEDICARE

## 2024-12-30 ENCOUNTER — HOSPITAL ENCOUNTER (OUTPATIENT)
Facility: HOSPITAL | Age: 84
Discharge: HOME | End: 2024-12-30
Attending: STUDENT IN AN ORGANIZED HEALTH CARE EDUCATION/TRAINING PROGRAM | Admitting: STUDENT IN AN ORGANIZED HEALTH CARE EDUCATION/TRAINING PROGRAM
Payer: MEDICARE

## 2024-12-30 VITALS
HEIGHT: 66 IN | SYSTOLIC BLOOD PRESSURE: 143 MMHG | DIASTOLIC BLOOD PRESSURE: 67 MMHG | OXYGEN SATURATION: 100 % | RESPIRATION RATE: 13 BRPM | BODY MASS INDEX: 23.3 KG/M2 | TEMPERATURE: 97 F | HEART RATE: 59 BPM | WEIGHT: 145 LBS

## 2024-12-30 DIAGNOSIS — R04.0 RECURRENT EPISTAXIS: ICD-10-CM

## 2024-12-30 LAB
BASOPHILS # BLD AUTO: 0 K/UL (ref 0–0.2)
BASOPHILS NFR BLD: 0 % (ref 0–1.9)
DIFFERENTIAL METHOD BLD: ABNORMAL
EOSINOPHIL # BLD AUTO: 0.2 K/UL (ref 0–0.5)
EOSINOPHIL NFR BLD: 4.1 % (ref 0–8)
ERYTHROCYTE [DISTWIDTH] IN BLOOD BY AUTOMATED COUNT: 16.1 % (ref 11.5–14.5)
HCT VFR BLD AUTO: 26.5 % (ref 40–54)
HGB BLD-MCNC: 8.1 G/DL (ref 14–18)
IMM GRANULOCYTES # BLD AUTO: 0.02 K/UL (ref 0–0.04)
IMM GRANULOCYTES NFR BLD AUTO: 0.4 % (ref 0–0.5)
INR PPP: 3.5 (ref 0.8–1.2)
LYMPHOCYTES # BLD AUTO: 1.2 K/UL (ref 1–4.8)
LYMPHOCYTES NFR BLD: 24.2 % (ref 18–48)
MCH RBC QN AUTO: 30.3 PG (ref 27–31)
MCHC RBC AUTO-ENTMCNC: 30.6 G/DL (ref 32–36)
MCV RBC AUTO: 99 FL (ref 82–98)
MONOCYTES # BLD AUTO: 0.5 K/UL (ref 0.3–1)
MONOCYTES NFR BLD: 9.7 % (ref 4–15)
NEUTROPHILS # BLD AUTO: 3 K/UL (ref 1.8–7.7)
NEUTROPHILS NFR BLD: 61.6 % (ref 38–73)
NRBC BLD-RTO: 0 /100 WBC
PLATELET # BLD AUTO: 161 K/UL (ref 150–450)
PMV BLD AUTO: 10.9 FL (ref 9.2–12.9)
PROTHROMBIN TIME: 35 SEC (ref 9–12.5)
RBC # BLD AUTO: 2.67 M/UL (ref 4.6–6.2)
WBC # BLD AUTO: 4.87 K/UL (ref 3.9–12.7)

## 2024-12-30 PROCEDURE — 37000008 HC ANESTHESIA 1ST 15 MINUTES: Performed by: STUDENT IN AN ORGANIZED HEALTH CARE EDUCATION/TRAINING PROGRAM

## 2024-12-30 PROCEDURE — 36000706: Performed by: STUDENT IN AN ORGANIZED HEALTH CARE EDUCATION/TRAINING PROGRAM

## 2024-12-30 PROCEDURE — 63600175 PHARM REV CODE 636 W HCPCS: Performed by: STUDENT IN AN ORGANIZED HEALTH CARE EDUCATION/TRAINING PROGRAM

## 2024-12-30 PROCEDURE — 71000015 HC POSTOP RECOV 1ST HR: Performed by: STUDENT IN AN ORGANIZED HEALTH CARE EDUCATION/TRAINING PROGRAM

## 2024-12-30 PROCEDURE — 63600175 PHARM REV CODE 636 W HCPCS

## 2024-12-30 PROCEDURE — 85025 COMPLETE CBC W/AUTO DIFF WBC: CPT | Performed by: STUDENT IN AN ORGANIZED HEALTH CARE EDUCATION/TRAINING PROGRAM

## 2024-12-30 PROCEDURE — 71000044 HC DOSC ROUTINE RECOVERY FIRST HOUR: Performed by: STUDENT IN AN ORGANIZED HEALTH CARE EDUCATION/TRAINING PROGRAM

## 2024-12-30 PROCEDURE — 27201423 OPTIME MED/SURG SUP & DEVICES STERILE SUPPLY: Performed by: STUDENT IN AN ORGANIZED HEALTH CARE EDUCATION/TRAINING PROGRAM

## 2024-12-30 PROCEDURE — 25000003 PHARM REV CODE 250: Performed by: STUDENT IN AN ORGANIZED HEALTH CARE EDUCATION/TRAINING PROGRAM

## 2024-12-30 PROCEDURE — 63600175 PHARM REV CODE 636 W HCPCS: Mod: JG

## 2024-12-30 PROCEDURE — 25000003 PHARM REV CODE 250

## 2024-12-30 PROCEDURE — 85610 PROTHROMBIN TIME: CPT | Performed by: STUDENT IN AN ORGANIZED HEALTH CARE EDUCATION/TRAINING PROGRAM

## 2024-12-30 PROCEDURE — 36000707: Performed by: STUDENT IN AN ORGANIZED HEALTH CARE EDUCATION/TRAINING PROGRAM

## 2024-12-30 PROCEDURE — 31237 NSL/SINS NDSC SURG BX POLYPC: CPT | Mod: 50,,, | Performed by: STUDENT IN AN ORGANIZED HEALTH CARE EDUCATION/TRAINING PROGRAM

## 2024-12-30 PROCEDURE — 37000009 HC ANESTHESIA EA ADD 15 MINS: Performed by: STUDENT IN AN ORGANIZED HEALTH CARE EDUCATION/TRAINING PROGRAM

## 2024-12-30 RX ORDER — PROPOFOL 10 MG/ML
VIAL (ML) INTRAVENOUS
Status: DISCONTINUED | OUTPATIENT
Start: 2024-12-30 | End: 2024-12-30

## 2024-12-30 RX ORDER — ONDANSETRON HYDROCHLORIDE 2 MG/ML
INJECTION, SOLUTION INTRAVENOUS
Status: DISCONTINUED | OUTPATIENT
Start: 2024-12-30 | End: 2024-12-30

## 2024-12-30 RX ORDER — CEFAZOLIN 2 G/1
INJECTION, POWDER, FOR SOLUTION INTRAMUSCULAR; INTRAVENOUS
Status: DISCONTINUED | OUTPATIENT
Start: 2024-12-30 | End: 2024-12-30

## 2024-12-30 RX ORDER — GLUCAGON 1 MG
1 KIT INJECTION
Status: DISCONTINUED | OUTPATIENT
Start: 2024-12-30 | End: 2024-12-30 | Stop reason: HOSPADM

## 2024-12-30 RX ORDER — LIDOCAINE HYDROCHLORIDE 10 MG/ML
1 INJECTION, SOLUTION EPIDURAL; INFILTRATION; INTRACAUDAL; PERINEURAL ONCE AS NEEDED
Status: COMPLETED | OUTPATIENT
Start: 2024-12-30 | End: 2024-12-30

## 2024-12-30 RX ORDER — VASOPRESSIN 20 [USP'U]/ML
INJECTION, SOLUTION INTRAMUSCULAR; SUBCUTANEOUS
Status: DISCONTINUED | OUTPATIENT
Start: 2024-12-30 | End: 2024-12-30

## 2024-12-30 RX ORDER — DEXAMETHASONE SODIUM PHOSPHATE 4 MG/ML
INJECTION, SOLUTION INTRA-ARTICULAR; INTRALESIONAL; INTRAMUSCULAR; INTRAVENOUS; SOFT TISSUE
Status: DISCONTINUED | OUTPATIENT
Start: 2024-12-30 | End: 2024-12-30

## 2024-12-30 RX ORDER — SUCCINYLCHOLINE CHLORIDE 20 MG/ML
INJECTION INTRAMUSCULAR; INTRAVENOUS
Status: DISCONTINUED | OUTPATIENT
Start: 2024-12-30 | End: 2024-12-30

## 2024-12-30 RX ORDER — OXYMETAZOLINE HCL 0.05 %
SPRAY, NON-AEROSOL (ML) NASAL
Status: DISCONTINUED | OUTPATIENT
Start: 2024-12-30 | End: 2024-12-30 | Stop reason: HOSPADM

## 2024-12-30 RX ORDER — FENTANYL CITRATE 50 UG/ML
INJECTION, SOLUTION INTRAMUSCULAR; INTRAVENOUS
Status: DISCONTINUED | OUTPATIENT
Start: 2024-12-30 | End: 2024-12-30

## 2024-12-30 RX ORDER — LIDOCAINE HYDROCHLORIDE 20 MG/ML
INJECTION, SOLUTION EPIDURAL; INFILTRATION; INTRACAUDAL; PERINEURAL
Status: DISCONTINUED | OUTPATIENT
Start: 2024-12-30 | End: 2024-12-30

## 2024-12-30 RX ORDER — PHENYLEPHRINE HYDROCHLORIDE 10 MG/ML
INJECTION INTRAVENOUS
Status: DISCONTINUED | OUTPATIENT
Start: 2024-12-30 | End: 2024-12-30

## 2024-12-30 RX ORDER — EPINEPHRINE 1 MG/ML
INJECTION, SOLUTION, CONCENTRATE INTRAVENOUS
Status: DISCONTINUED | OUTPATIENT
Start: 2024-12-30 | End: 2024-12-30 | Stop reason: HOSPADM

## 2024-12-30 RX ORDER — ROCURONIUM BROMIDE 10 MG/ML
INJECTION, SOLUTION INTRAVENOUS
Status: DISCONTINUED | OUTPATIENT
Start: 2024-12-30 | End: 2024-12-30

## 2024-12-30 RX ORDER — OXYCODONE HYDROCHLORIDE 5 MG/1
5 TABLET ORAL
Status: DISCONTINUED | OUTPATIENT
Start: 2024-12-30 | End: 2024-12-30 | Stop reason: HOSPADM

## 2024-12-30 RX ORDER — SODIUM CHLORIDE 0.9 % (FLUSH) 0.9 %
10 SYRINGE (ML) INJECTION
Status: DISCONTINUED | OUTPATIENT
Start: 2024-12-30 | End: 2024-12-30 | Stop reason: HOSPADM

## 2024-12-30 RX ADMIN — LIDOCAINE HYDROCHLORIDE 40 MG: 20 INJECTION, SOLUTION EPIDURAL; INFILTRATION; INTRACAUDAL at 12:12

## 2024-12-30 RX ADMIN — SUCCINYLCHOLINE CHLORIDE 80 MG: 20 INJECTION, SOLUTION INTRAMUSCULAR; INTRAVENOUS; PARENTERAL at 12:12

## 2024-12-30 RX ADMIN — DEXAMETHASONE SODIUM PHOSPHATE 4 MG: 4 INJECTION INTRA-ARTICULAR; INTRALESIONAL; INTRAMUSCULAR; INTRAVENOUS; SOFT TISSUE at 12:12

## 2024-12-30 RX ADMIN — SODIUM CHLORIDE, SODIUM ACETATE ANHYDROUS, SODIUM GLUCONATE, POTASSIUM CHLORIDE, AND MAGNESIUM CHLORIDE: 526; 222; 502; 37; 30 INJECTION, SOLUTION INTRAVENOUS at 12:12

## 2024-12-30 RX ADMIN — ONDANSETRON 4 MG: 2 INJECTION INTRAMUSCULAR; INTRAVENOUS at 12:12

## 2024-12-30 RX ADMIN — PHENYLEPHRINE HYDROCHLORIDE 100 MCG: 10 INJECTION INTRAVENOUS at 12:12

## 2024-12-30 RX ADMIN — GLYCOPYRROLATE 0.2 MG: 0.2 INJECTION INTRAMUSCULAR; INTRAVENOUS at 12:12

## 2024-12-30 RX ADMIN — PROPOFOL 100 MG: 10 INJECTION, EMULSION INTRAVENOUS at 12:12

## 2024-12-30 RX ADMIN — ROCURONIUM BROMIDE 20 MG: 10 INJECTION, SOLUTION INTRAVENOUS at 12:12

## 2024-12-30 RX ADMIN — LIDOCAINE HYDROCHLORIDE 10 MG: 10 INJECTION, SOLUTION EPIDURAL; INFILTRATION; INTRACAUDAL; PERINEURAL at 09:12

## 2024-12-30 RX ADMIN — VASOPRESSIN 2 UNITS: 20 INJECTION INTRAVENOUS at 12:12

## 2024-12-30 RX ADMIN — FENTANYL CITRATE 75 MCG: 50 INJECTION, SOLUTION INTRAMUSCULAR; INTRAVENOUS at 12:12

## 2024-12-30 RX ADMIN — CEFAZOLIN 2 G: 2 INJECTION, POWDER, FOR SOLUTION INTRAMUSCULAR; INTRAVENOUS at 12:12

## 2024-12-30 RX ADMIN — VASOPRESSIN 3 UNITS: 20 INJECTION INTRAVENOUS at 12:12

## 2024-12-30 RX ADMIN — SUGAMMADEX 200 MG: 100 INJECTION, SOLUTION INTRAVENOUS at 12:12

## 2024-12-30 NOTE — ANESTHESIA POSTPROCEDURE EVALUATION
Anesthesia Post Evaluation    Patient: Dariel Urbina    Procedure(s) Performed: Procedure(s) (LRB):  CONTROL OF EPISTAXIS, POSTERIOR, USING NASAL PACKING OR CAUTERIZATION (Bilateral)    Final Anesthesia Type: general      Patient location during evaluation: PACU  Patient participation: Yes- Able to Participate  Level of consciousness: awake and alert and oriented  Post-procedure vital signs: reviewed and stable  Pain management: adequate  Airway patency: patent    PONV status at discharge: No PONV  Anesthetic complications: no      Cardiovascular status: hemodynamically stable  Respiratory status: unassisted  Hydration status: euvolemic  Follow-up not needed.              Vitals Value Taken Time   /63 12/30/24 1331   Temp 36.2 °C (97.2 °F) 12/30/24 1312   Pulse 60 12/30/24 1335   Resp 11 12/30/24 1335   SpO2 100 % 12/30/24 1335   Vitals shown include unfiled device data.      No case tracking events are documented in the log.      Pain/Sanjeev Score: Sanjeev Score: 9 (12/30/2024  1:30 PM)

## 2024-12-30 NOTE — PLAN OF CARE
Lab drawn and hand carried for processing. Pt provided with warm blankets, pt spouse present at bedside, call light in reach, bed low and locked. Pt denies needs at this time.

## 2024-12-30 NOTE — TRANSFER OF CARE
"Anesthesia Transfer of Care Note    Patient: Dariel Urbina    Procedure(s) Performed: Procedure(s) (LRB):  CONTROL OF EPISTAXIS, POSTERIOR, USING NASAL PACKING OR CAUTERIZATION (Bilateral)    Patient location: PACU    Anesthesia Type: general    Transport from OR: Transported from OR on 6-10 L/min O2 by face mask with adequate spontaneous ventilation    Post pain: adequate analgesia    Post assessment: no apparent anesthetic complications and tolerated procedure well    Post vital signs: stable    Level of consciousness: awake and alert    Nausea/Vomiting: no nausea/vomiting    Complications: none    Transfer of care protocol was followed      Last vitals: Visit Vitals  BP (!) 158/70   Pulse 62   Temp 36.2 °C (97.2 °F) (Temporal)   Resp 13   Ht 5' 6" (1.676 m)   Wt 65.8 kg (145 lb)   SpO2 100%   BMI 23.40 kg/m²     "

## 2024-12-30 NOTE — INTERVAL H&P NOTE
The patient has been examined and the H&P has been reviewed:    I concur with the findings and changes have been noted since the H&P was written: Patient reports packing fell out almost immediately after his most recent appointment and has had persistent epistaxis over the weekend.    Surgery risks, benefits and alternative options discussed and understood by patient/family.          There are no hospital problems to display for this patient.

## 2024-12-30 NOTE — BRIEF OP NOTE
Abdulkadir Duval - Surgery ([facility])  Brief Operative Note    Surgery Date: 12/30/2024     Surgeons and Role:     * Jono Russell MD - Primary     * Keira Mcmillan MD - Resident - Assisting        Pre-op Diagnosis:  Nasal septal deviation [J34.2]  Epistaxis [R04.0]    Post-op Diagnosis:  Post-Op Diagnosis Codes:     * Nasal septal deviation [J34.2]     * Epistaxis [R04.0]    Procedure(s) (LRB):  CONTROL OF EPISTAXIS, POSTERIOR, USING NASAL PACKING OR CAUTERIZATION (Bilateral)    Anesthesia: General    Operative Findings:   Diffuse oozing of nasal mucosa L > R   Coblator used with control of epistaxis     Estimated Blood Loss: * No values recorded between 12/30/2024 12:25 PM and 12/30/2024  1:11 PM *         Specimens:   Specimen (24h ago, onward)      None              Discharge Note    OUTCOME: Patient tolerated treatment/procedure well without complication and is now ready for discharge.    DISPOSITION: Home or Self Care    FINAL DIAGNOSIS:  <principal problem not specified>    FOLLOWUP: In clinic    DISCHARGE INSTRUCTIONS:  No discharge procedures on file.

## 2024-12-30 NOTE — OP NOTE
DATE OF PROCEDURE: 12/30/2024     PREOPERATIVE DIAGNOSES:   Nasal septal deviation [J34.2]  Epistaxis [R04.0]    POSTOPERATIVE DIAGNOSES:   Nasal septal deviation [J34.2]  Epistaxis [R04.0]    SURGEON:  Surgeons and Role:     * Jono Russell MD - Primary     * Keira Mcmillan MD - Resident - Assisting    PROCEDURES PERFORMED:   Nasal endoscopy cautery of nose, bilateral.  Nasal endoscopy and debridement of nonviable tissue, bilateral.    ANESTHESIA: General    INDICATIONS FOR PROCEDURE:   Dariel Urbina is a 84 y.o. well know to me presents to operative management of epistaxis.  He has previously undergone multiple ligations, embolizations and cauteries for this in the past.  He has recently seen in the clinic core he had continued epistaxis with declining H&H.    He was apprised of the risks, benefits and alternatives to surgery.  In spite of the risk inherent to surgery,he provided informed consent for the aforementioned procedures.     PROCEDURE IN DETAIL:  The patient was taken to the operating room and placed on the operating table in the supine position.  General endotracheal anesthesia was induced by the anesthesia team.     Preoperative time-out was taken to confirm the patient and procedure being performed.     Zero-degree endoscope was used to assess the left and right nasal cavity.  There was no active bleeding but fresh blood was noted within the nasopharynx.  Old blood products were suctioned free from the bilateral maxillary sinuses.  Again there was diffuse mucosal oozing throughout the left nasal septum.  A Coblator was used on 4 w to cauterize multiple spots along the left nasal septum.  One area on the left side upper lateral nasal wall which was similarly cauterized.  Attention was then turned to the right nasal cavity.  Significant blood vessels were present along the head of the middle turbinate.  This was similarly cauterized on the right.  That has point procedure was deemed to  be complete.  There was not a extravasation of blood products.    Puragel that has placed over the areas of cautery to assist with wound healing.  The patient was then handed over to anesthesia where he was extubated in a stable condition.    There were no intraoperative complications.  I was present for and participated in the entire procedure as dictated above.       ESTIMATED BLOOD LOSS: 5cc    SPECIMENS:   Specimen (24h ago, onward)      None          Jono Russell MD

## 2025-01-02 ENCOUNTER — LAB VISIT (OUTPATIENT)
Dept: LAB | Facility: HOSPITAL | Age: 85
End: 2025-01-02
Attending: INTERNAL MEDICINE
Payer: MEDICARE

## 2025-01-02 ENCOUNTER — ANTI-COAG VISIT (OUTPATIENT)
Dept: CARDIOLOGY | Facility: CLINIC | Age: 85
End: 2025-01-02
Payer: MEDICARE

## 2025-01-02 DIAGNOSIS — Z95.2 H/O MECHANICAL AORTIC VALVE REPLACEMENT: ICD-10-CM

## 2025-01-02 DIAGNOSIS — Z79.01 CHRONIC ANTICOAGULATION: ICD-10-CM

## 2025-01-02 DIAGNOSIS — Z79.01 CHRONIC ANTICOAGULATION: Primary | ICD-10-CM

## 2025-01-02 LAB
INR PPP: 2.3 (ref 0.8–1.2)
PROTHROMBIN TIME: 24 SEC (ref 9–12.5)

## 2025-01-02 PROCEDURE — 93793 ANTICOAG MGMT PT WARFARIN: CPT | Mod: S$GLB,,,

## 2025-01-02 PROCEDURE — 36415 COLL VENOUS BLD VENIPUNCTURE: CPT | Mod: HCNC | Performed by: INTERNAL MEDICINE

## 2025-01-02 PROCEDURE — 85610 PROTHROMBIN TIME: CPT | Mod: HCNC | Performed by: INTERNAL MEDICINE

## 2025-01-02 NOTE — PROGRESS NOTES
[email or web address]    2025 2:39 PM    Assessment/Plan:    Patient presents today with therapeutic INR.    Assessment of patient findings and chart review: Reviewed    Recommendation for patient's warfarin regimen: Decrease maintenance dose    Recommend repeat INR in 1 week  _________________________________________________________________    Dariel Urbina (84 y.o.) is followed by the "[employer] Anticoagulation Management Program.    Anticoagulation Summary  As of 2025      INR goal:  2.0-2.5   TTR:  66.9% (12.3 y)   INR used for dosin.3 (2025)   Warfarin maintenance plan:  2.5 mg (5 mg x 0.5) every Mon, Fri; 5 mg (5 mg x 1) all other days   Weekly warfarin total:  30 mg   Plan last modified:  Laurie Patino, PharmD (2025)   Next INR check:  1/10/2025   Target end date:  --    Indications    Chronic anticoagulation [Z79.01]  H/O mechanical aortic valve replacement [Z95.2]                 Anticoagulation Episode Summary       INR check location:  Clinic Lab    Preferred lab:  --    Send INR reminders to:  [facility] COUMADIN MONITORING POOL    Comments:  [facility] IM - Internal Med Clinic only Mon - Thu // carpel tunnel release  - target low end of range          Anticoagulation Care Providers       Provider Role Specialty Phone number    Devon Langston Jr., MD [facility] Internal Medicine [phone]

## 2025-01-06 ENCOUNTER — OFFICE VISIT (OUTPATIENT)
Dept: DERMATOLOGY | Facility: CLINIC | Age: 85
End: 2025-01-06
Payer: MEDICARE

## 2025-01-06 DIAGNOSIS — Z09 POSTOP CHECK: Primary | ICD-10-CM

## 2025-01-06 PROCEDURE — 1126F AMNT PAIN NOTED NONE PRSNT: CPT | Mod: HCNC,CPTII,S$GLB, | Performed by: DERMATOLOGY

## 2025-01-06 PROCEDURE — 99024 POSTOP FOLLOW-UP VISIT: CPT | Mod: HCNC,S$GLB,, | Performed by: DERMATOLOGY

## 2025-01-06 PROCEDURE — 1101F PT FALLS ASSESS-DOCD LE1/YR: CPT | Mod: HCNC,CPTII,S$GLB, | Performed by: DERMATOLOGY

## 2025-01-06 PROCEDURE — 1160F RVW MEDS BY RX/DR IN RCRD: CPT | Mod: HCNC,CPTII,S$GLB, | Performed by: DERMATOLOGY

## 2025-01-06 PROCEDURE — 1159F MED LIST DOCD IN RCRD: CPT | Mod: HCNC,CPTII,S$GLB, | Performed by: DERMATOLOGY

## 2025-01-06 PROCEDURE — 99999 PR PBB SHADOW E&M-EST. PATIENT-LVL III: CPT | Mod: PBBFAC,HCNC,, | Performed by: DERMATOLOGY

## 2025-01-06 PROCEDURE — 3288F FALL RISK ASSESSMENT DOCD: CPT | Mod: HCNC,CPTII,S$GLB, | Performed by: DERMATOLOGY

## 2025-01-06 NOTE — PROGRESS NOTES
84 y.o. male patient is here for suture removal following Mohs' surgery.    Patient reports no problems.    WOUND PE:  The crown of scalp sutures intact. Wound healing well. Good skin edges. No signs or symptoms of infection.    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    PLAN:  Sutures removed today by  Trinity Quinn MA . Steri-strips applied.  Continue wound care.  Keep moist with Aquaphor.  Call if any issues arise    RTC:  In 3-6 months with Usha Muñiz MD for skin check or sooner if new concern arises.

## 2025-01-07 ENCOUNTER — OFFICE VISIT (OUTPATIENT)
Dept: OTOLARYNGOLOGY | Facility: CLINIC | Age: 85
End: 2025-01-07
Payer: MEDICARE

## 2025-01-07 VITALS — SYSTOLIC BLOOD PRESSURE: 133 MMHG | WEIGHT: 149 LBS | BODY MASS INDEX: 24.05 KG/M2 | DIASTOLIC BLOOD PRESSURE: 49 MMHG

## 2025-01-07 DIAGNOSIS — R04.0 EPISTAXIS: Primary | ICD-10-CM

## 2025-01-07 DIAGNOSIS — Z79.01 ANTICOAGULATED BY ANTICOAGULATION TREATMENT: ICD-10-CM

## 2025-01-07 DIAGNOSIS — J34.89 NASAL SEPTAL PERFORATION: ICD-10-CM

## 2025-01-07 DIAGNOSIS — N18.32 STAGE 3B CHRONIC KIDNEY DISEASE: ICD-10-CM

## 2025-01-07 PROCEDURE — 3075F SYST BP GE 130 - 139MM HG: CPT | Mod: HCNC,CPTII,S$GLB, | Performed by: STUDENT IN AN ORGANIZED HEALTH CARE EDUCATION/TRAINING PROGRAM

## 2025-01-07 PROCEDURE — 3078F DIAST BP <80 MM HG: CPT | Mod: HCNC,CPTII,S$GLB, | Performed by: STUDENT IN AN ORGANIZED HEALTH CARE EDUCATION/TRAINING PROGRAM

## 2025-01-07 PROCEDURE — 1160F RVW MEDS BY RX/DR IN RCRD: CPT | Mod: HCNC,CPTII,S$GLB, | Performed by: STUDENT IN AN ORGANIZED HEALTH CARE EDUCATION/TRAINING PROGRAM

## 2025-01-07 PROCEDURE — 99999 PR PBB SHADOW E&M-EST. PATIENT-LVL III: CPT | Mod: PBBFAC,HCNC,, | Performed by: STUDENT IN AN ORGANIZED HEALTH CARE EDUCATION/TRAINING PROGRAM

## 2025-01-07 PROCEDURE — 99213 OFFICE O/P EST LOW 20 MIN: CPT | Mod: HCNC,S$GLB,, | Performed by: STUDENT IN AN ORGANIZED HEALTH CARE EDUCATION/TRAINING PROGRAM

## 2025-01-07 PROCEDURE — 1111F DSCHRG MED/CURRENT MED MERGE: CPT | Mod: HCNC,CPTII,S$GLB, | Performed by: STUDENT IN AN ORGANIZED HEALTH CARE EDUCATION/TRAINING PROGRAM

## 2025-01-07 PROCEDURE — 1159F MED LIST DOCD IN RCRD: CPT | Mod: HCNC,CPTII,S$GLB, | Performed by: STUDENT IN AN ORGANIZED HEALTH CARE EDUCATION/TRAINING PROGRAM

## 2025-01-07 NOTE — PROGRESS NOTES
Subjective:      Dariel is a 84 y.o. male who comes for follow-up of  epistaxis . His last visit of me was on 12/27/24. Since most recent endoscopy and cautery has not had further bleeding. Reports nasal obstruction from dissolvable packing. Sleeping well.     He previously undergone bilateral SP ligation followed by bilateral embolization, cautery on the left septum for persistent bleeding on 11/03/2023 and again recently on 12/18/23. He recently underwent left AEA ligation on 2/28/24 and recently underwent nasal endoscopy and control of epistaxis on 6/18/24.  He then had repeat embolization on 08/06/24. Most recently s/p cautery on 12/30/24.    His current sinus regime consists of: Saline.     The assessment of quality and severity of symptoms as measured by the SNOT-22 score is deferred.    The patient's medications, allergies, past medical, surgical, social and family histories were reviewed and updated as appropriate.    ROS     A detailed review of systems was obtained with pertinent positives as per the above HPI, and otherwise negative.        Objective:     BP (!) 133/49   Wt 67.6 kg (149 lb)   BMI 24.05 kg/m²        Constitutional:   Vital signs are normal. He appears well-developed and well-nourished.     Head:  Normocephalic and atraumatic.     Ears:  Hearing normal to normal and whispered voice; external ear normal without scars, lesions, or masses; ear canal, tympanic membrane, and middle ear normal..   Right Ear: No swelling. Tympanic membrane is not perforated and not bulging. No middle ear effusion.   Left Ear: No swelling. Tympanic membrane is not perforated and not bulging.  No middle ear effusion.     Nose:  Nose normal including turbinates, nasal mucosa, sinuses and nasal septum. No epistaxis.         Mouth/Throat  Oropharynx clear and moist without lesions or asymmetry and normal uvula midline. Normal dentition. No tonsillar abscesses. Tonsillar exudate.      Neck:  Neck normal  "without thyromegaly masses, asymmetry, normal tracheal structure, crepitus, and tenderness, thyroid normal, trachea normal, phonation normal, full range of motion with neck supple and no adenopathy. No stridor present.        Head (right side): No submental adenopathy present.        Head (left side): No submental adenopathy present.     He has no cervical adenopathy.     Pulmonary/Chest:   No stridor.     Procedure    None.     Data Reviewed    WBC (K/uL)   Date Value   12/30/2024 4.87     Eosinophil % (%)   Date Value   12/30/2024 4.1     Eos # (K/uL)   Date Value   12/30/2024 0.2     Platelets (K/uL)   Date Value   12/30/2024 161     Glucose (mg/dL)   Date Value   12/18/2024 111 (H)     No results found for: "IGE"    No sinus imaging available.    Assessment:     1. Epistaxis    2. Nasal septal perforation    3. Stage 3b chronic kidney disease    4. Anticoagulated by anticoagulation treatment          Plan:     - Aggressive nasal saline to help with packing.  - F/u coumadin clinic  - RTC 1 month    Jono Russell MD     "

## 2025-01-10 ENCOUNTER — LAB VISIT (OUTPATIENT)
Dept: LAB | Facility: HOSPITAL | Age: 85
End: 2025-01-10
Attending: INTERNAL MEDICINE
Payer: MEDICARE

## 2025-01-10 ENCOUNTER — OFFICE VISIT (OUTPATIENT)
Dept: INTERNAL MEDICINE | Facility: CLINIC | Age: 85
End: 2025-01-10
Payer: MEDICARE

## 2025-01-10 ENCOUNTER — ANTI-COAG VISIT (OUTPATIENT)
Dept: CARDIOLOGY | Facility: CLINIC | Age: 85
End: 2025-01-10
Payer: MEDICARE

## 2025-01-10 VITALS
WEIGHT: 152.75 LBS | SYSTOLIC BLOOD PRESSURE: 140 MMHG | HEIGHT: 66 IN | BODY MASS INDEX: 24.55 KG/M2 | DIASTOLIC BLOOD PRESSURE: 56 MMHG

## 2025-01-10 DIAGNOSIS — Z95.2 H/O MECHANICAL AORTIC VALVE REPLACEMENT: ICD-10-CM

## 2025-01-10 DIAGNOSIS — I48.0 PAROXYSMAL ATRIAL FIBRILLATION: ICD-10-CM

## 2025-01-10 DIAGNOSIS — I15.2 HYPERTENSION ASSOCIATED WITH DIABETES: ICD-10-CM

## 2025-01-10 DIAGNOSIS — E11.59 HYPERTENSION ASSOCIATED WITH DIABETES: ICD-10-CM

## 2025-01-10 DIAGNOSIS — R04.0 EPISTAXIS: ICD-10-CM

## 2025-01-10 DIAGNOSIS — Z79.01 CHRONIC ANTICOAGULATION: Primary | ICD-10-CM

## 2025-01-10 DIAGNOSIS — E11.69 HYPERLIPIDEMIA ASSOCIATED WITH TYPE 2 DIABETES MELLITUS: ICD-10-CM

## 2025-01-10 DIAGNOSIS — N18.4 TYPE 2 DIABETES MELLITUS WITH STAGE 4 CHRONIC KIDNEY DISEASE, WITHOUT LONG-TERM CURRENT USE OF INSULIN: Primary | Chronic | ICD-10-CM

## 2025-01-10 DIAGNOSIS — R04.0 RECURRENT EPISTAXIS: ICD-10-CM

## 2025-01-10 DIAGNOSIS — I50.42 CHRONIC COMBINED SYSTOLIC AND DIASTOLIC HEART FAILURE: ICD-10-CM

## 2025-01-10 DIAGNOSIS — D62 ACUTE BLOOD LOSS ANEMIA: ICD-10-CM

## 2025-01-10 DIAGNOSIS — D64.9 ANEMIA, UNSPECIFIED TYPE: ICD-10-CM

## 2025-01-10 DIAGNOSIS — Z79.01 CHRONIC ANTICOAGULATION: ICD-10-CM

## 2025-01-10 DIAGNOSIS — E11.22 TYPE 2 DIABETES MELLITUS WITH STAGE 4 CHRONIC KIDNEY DISEASE, WITHOUT LONG-TERM CURRENT USE OF INSULIN: Primary | Chronic | ICD-10-CM

## 2025-01-10 DIAGNOSIS — I70.219 ATHEROSCLEROSIS OF NATIVE ARTERY OF LOWER EXTREMITY WITH INTERMITTENT CLAUDICATION, UNSPECIFIED LATERALITY: ICD-10-CM

## 2025-01-10 DIAGNOSIS — N18.4 CHRONIC KIDNEY DISEASE, STAGE 4 (SEVERE): ICD-10-CM

## 2025-01-10 DIAGNOSIS — E78.5 HYPERLIPIDEMIA ASSOCIATED WITH TYPE 2 DIABETES MELLITUS: ICD-10-CM

## 2025-01-10 PROBLEM — E87.20 ACIDOSIS: Status: RESOLVED | Noted: 2018-03-20 | Resolved: 2025-01-10

## 2025-01-10 PROBLEM — R13.10 DYSPHAGIA: Status: RESOLVED | Noted: 2023-11-21 | Resolved: 2025-01-10

## 2025-01-10 PROBLEM — N17.9 AKI (ACUTE KIDNEY INJURY): Status: RESOLVED | Noted: 2022-12-21 | Resolved: 2025-01-10

## 2025-01-10 PROBLEM — R49.0 DYSPHONIA: Status: RESOLVED | Noted: 2024-01-18 | Resolved: 2025-01-10

## 2025-01-10 LAB
BASOPHILS # BLD AUTO: 0.02 K/UL (ref 0–0.2)
BASOPHILS NFR BLD: 0.3 % (ref 0–1.9)
DIFFERENTIAL METHOD BLD: ABNORMAL
EOSINOPHIL # BLD AUTO: 0.1 K/UL (ref 0–0.5)
EOSINOPHIL NFR BLD: 0.8 % (ref 0–8)
ERYTHROCYTE [DISTWIDTH] IN BLOOD BY AUTOMATED COUNT: 16.4 % (ref 11.5–14.5)
HCT VFR BLD AUTO: 26.8 % (ref 40–54)
HGB BLD-MCNC: 8 G/DL (ref 14–18)
IMM GRANULOCYTES # BLD AUTO: 0.03 K/UL (ref 0–0.04)
IMM GRANULOCYTES NFR BLD AUTO: 0.4 % (ref 0–0.5)
INR PPP: 1.4 (ref 0.8–1.2)
LYMPHOCYTES # BLD AUTO: 0.9 K/UL (ref 1–4.8)
LYMPHOCYTES NFR BLD: 12.7 % (ref 18–48)
MCH RBC QN AUTO: 30.5 PG (ref 27–31)
MCHC RBC AUTO-ENTMCNC: 29.9 G/DL (ref 32–36)
MCV RBC AUTO: 102 FL (ref 82–98)
MONOCYTES # BLD AUTO: 0.5 K/UL (ref 0.3–1)
MONOCYTES NFR BLD: 7.5 % (ref 4–15)
NEUTROPHILS # BLD AUTO: 5.6 K/UL (ref 1.8–7.7)
NEUTROPHILS NFR BLD: 78.3 % (ref 38–73)
NRBC BLD-RTO: 0 /100 WBC
PLATELET # BLD AUTO: 160 K/UL (ref 150–450)
PMV BLD AUTO: 11.1 FL (ref 9.2–12.9)
PROTHROMBIN TIME: 15 SEC (ref 9–12.5)
RBC # BLD AUTO: 2.62 M/UL (ref 4.6–6.2)
WBC # BLD AUTO: 7.09 K/UL (ref 3.9–12.7)

## 2025-01-10 PROCEDURE — 99999 PR PBB SHADOW E&M-EST. PATIENT-LVL III: CPT | Mod: PBBFAC,HCNC,, | Performed by: INTERNAL MEDICINE

## 2025-01-10 PROCEDURE — 36415 COLL VENOUS BLD VENIPUNCTURE: CPT | Mod: HCNC | Performed by: INTERNAL MEDICINE

## 2025-01-10 PROCEDURE — 85610 PROTHROMBIN TIME: CPT | Mod: HCNC | Performed by: INTERNAL MEDICINE

## 2025-01-10 PROCEDURE — 85025 COMPLETE CBC W/AUTO DIFF WBC: CPT | Mod: HCNC | Performed by: INTERNAL MEDICINE

## 2025-01-10 NOTE — PROGRESS NOTES
[facility]    01/10/2025 1:50 PM    Assessment/Plan:    Patient presents today with subtherapeutic  INR.    Assessment of patient findings and chart review: Subtherapeutic INR could be due to absorption decrease 2/2 to diarrhea.    Recommendation for patient's warfarin regimen: Boost dose today to 5mg then resume current maintenance dose    Recommend repeat INR in 1 week  _________________________________________________________________    Dariel Urbina (84 y.o.) is followed by the [facility]ticoagulation Management [facility].    Anticoagulation Summary  As of 1/10/2025      INR goal:  2.0-2.5   TTR:  66.8% (12.3 y)   INR used for dosin.4 (1/10/2025)   Warfarin maintenance plan:  2.5 mg (5 mg x 0.5) every Mon, Fri; 5 mg (5 mg x 1) all other days   Weekly warfarin total:  30 mg   Plan last modified:  Laurie Patino, PharmD (2025)   Next INR check:  2025   Target end date:  --    Indications    Chronic anticoagulation [Z79.01]  H/O mechanical aortic valve replacement [Z95.2]                 Anticoagulation Episode Summary       INR check location:  Clinic Lab    Preferred lab:  --    Send INR reminders to:  [facility] COUMADIN MONITORING POOL    Comments:  [facility] IM - Internal Med Clinic only Mon - Thu // carpel tunnel release  - target low end of range          Anticoagulation Care Providers       Provider Role Specialty Phone number    Devon Langston Jr., MD [facility] Internal Medicine [phone]

## 2025-01-10 NOTE — PROGRESS NOTES
This note was generated with [employer] voice recognition software. I apologize for any possible typographical errors.  Ashvin seems to have trouble with pronouns so I apologize if I Mis pronoun anyone         He comes to follow up recent hospitalization  He comes in to follow up his anemia and   epistaxis.  He was in the hospital 12/30/2024.  He hd a repeat nose cauterization and if has helped.  He dis bleed some this morning.  Last saw ENT 1/7/2025.    He previously undergone bilateral SP ligation followed by bilateral embolization, cautery on the left septum for persistent bleeding on 11/03/2023 and again recently on 12/18/23. He   underwent left AEA ligation on 2/28/24.   He is on coumadin and is getting INR checked today.       He has a past medical history of CAD s/p CABG 1990's, mechanical AVR (on warfarin c/b long standing epistaxis) c/b mod-severe MR w/ mitraclip, afib, GIB 2/2 diverticulosis s/p partial colon resection, T2DM (diet controlled), gout, HLD, HTN, CKD4 (Cr 2.3-2.5), and recurrent epistaxis on warfarin .             D/c labs-- 8.1/26.5.    and bun/creatinine was 44/2.1 -- - He has stage 3 B ckd.   .  Taking iron pills.      Has Nepho a nephrology but he does not want to go to [facility].                Active Problem List with Overview Notes     Diagnosis Date Noted    Acute hypoxic respiratory failure 05/30/2024    Benign essential HTN 05/30/2024    ACP (advance care planning) 05/30/2024    Acute on chronic systolic heart failure 05/30/2024       POA  After blood transfusion, requiring O2  Diuresed with IV Lasix       Dysphonia 01/18/2024    Secondary hyperparathyroidism of renal origin 01/03/2024    Dysphagia 11/21/2023    Aortic calcification 07/26/2023    Mitral insufficiency 03/22/2023    Paroxysmal atrial fibrillation 02/06/2023    Coronary artery disease involving native coronary artery of native heart without angina pectoris 12/29/2022    Acute blood loss anemia 12/21/2022    Recurrent  "epistaxis 12/21/2022    Supratherapeutic INR 12/21/2022    Syncope 09/29/2022    Acute kidney injury superimposed on chronic kidney disease 02/10/2022    Anemia 02/03/2022    Atherosclerosis of native coronary artery of native heart without angina pectoris 09/29/2021    Severe carpal tunnel syndrome of right wrist 05/19/2020    Bilateral carpal tunnel syndrome 04/30/2020    Numbness and tingling in both hands 04/30/2020    Hypertension associated with diabetes 09/25/2019    Hx of colonic polyp 07/28/2018    Gastrointestinal hemorrhage associated with intestinal diverticulosis 04/01/2018       4-3-18 C-scope  - Hemorrhoids found on perianal exam.   - Blood in the rectum and in the sigmoid colon.  - Patent end-to-end colo-colonic anastomosis,  characterized by healthy appearing mucosa.  - Diverticulosis in the sigmoid colon.   - Diverticulosis in the sigmoid colon. There was  active bleeding coming from the diverticular      opening. Clip (MR conditional) was placed.   - One 4 mm polyp in the descending colon. Resection   not attempted.   - One 10 mm polyp at the splenic flexure. Resection  not attempted. Tattooed.       NSTEMI (non-ST elevated myocardial infarction) 03/21/2018    Epistaxis 03/21/2018    Metabolic acidosis with normal anion gap and bicarbonate losses 03/20/2018    Bilateral carotid artery disease 02/09/2017    Type 2 diabetes mellitus with diabetic peripheral angiopathy without gangrene 05/27/2015    H/O mechanical aortic valve replacement 09/17/2014    Atherosclerosis of native artery of extremity with intermittent claudication 09/17/2014    Chronic anticoagulation 09/26/2012    Hyperlipidemia associated with type 2 diabetes mellitus 09/26/2012    History of colon resection 09/26/2012    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    History of gout 09/26/2012          BP (!) 140/56 (BP Location: Right arm, Patient Position: Sitting)   Ht 5' 6" (1.676 m)   Wt 69.3 kg (152 lb 12.5 oz)   BMI 24.66 kg/m² "        Physical Exam  Vitals reviewed.   Constitutional:       General: He is not in acute distress.     Appearance: He is not ill-appearing or toxic-appearing.   HENT:      Head: Normocephalic and atraumatic.   Eyes:      General: No scleral icterus.  Cardiovascular:      Rate and Rhythm: Normal rate. Rhythm irregular.      Comments: Metallic click  Pulmonary:      Effort: Pulmonary effort is normal. No respiratory distress.      Breath sounds: No wheezing or rales.   Abdominal:      General: Abdomen is flat. There is no distension.      Tenderness: There is no abdominal tenderness.   Musculoskeletal:      Right lower leg: Edema (1+ at ankles) present.      Left lower leg: Edema present.   Skin:     General: Skin is warm.   Neurological:      Mental Status: He is alert and oriented to person, place, and time. Mental status is at baseline.      Motor: No weakness.      Assessment and plan:     Acute blood loss anemia  -    will recheck cbc     chronic kidney disease stage 3 B-  -     will refer to Nephrology at [facility]-  has been pretty stable so we can wait.    Anemia, unspecified type  -     Ambulatory referral/consult to Home Health; Future; Expected date: 06/07/2024     Recurrent Epistaxis-- following with ENT-       H/O mechanical aortic valve replacement

## 2025-01-13 ENCOUNTER — OUTPATIENT CASE MANAGEMENT (OUTPATIENT)
Dept: ADMINISTRATIVE | Facility: OTHER | Age: 85
End: 2025-01-13
Payer: MEDICARE

## 2025-01-13 NOTE — PROGRESS NOTES
Outpatient Care Management  Plan of Care Follow Up Visit    Patient: Dariel Urbina  MRN: [id number]  Date of Service: 01/13/2025  Completed by: Michaela Tang RN  Referral Date: 12/16/2024    Reason for Visit   Patient presents with    OPCM RN Follow Up Call    Nursing Assessment       Brief Summary: Patient's spouse, Ameena, states that the patient is still nose bleeding everyday since his nasal cautery surgery on 12/30.  She states she has seen advertisements for a Watchman device that he can have implanted and get him off of the coumadin.  She states she does not know the signs and symptoms of CKD.  She does not know what labs to look for when he gets his lab results back in the portal.  She states his systolic BP has been running about 136-138.  She states that the patient has an appointment to see a Nephrologist in [facility] but he does not want to go there, he wants to see a Nephrologist at the [facility].  Patient's spouse, Ameena, states that she uses Mrs Dash and used to use an artificial salt that she cannot remember the name of and she already threw away the bottle.  She states she did not like it so she does not use it anymore.  She also uses chicken and pot roast salt free seasoning.  But she states the patient is eating junk food lately.  She states he is all of a sudden complaining about her food not tasting good anymore and he does not eat much of it.  She states he has been losing some weight.  According to his record, his last weight was 152 on 1/10 at the MD's office.     -CM encouraged Ameena to talk to the PCP, Dr. Langston, about the Watchman device.    -CM called to schedule an appointment with Nephrology at the [facility], a message was sent to the Nephrology office to call the patient to schedule.  The Nephrology office called and scheduled an appointment with OSWALD Loza for 1/23 at 8:30 AM at [facility].   -NOLA reviewed the signs and symptoms of CKD including anorexia; fatigue;  weakness; difficulty sleeping; confusion; frequent urination, especially at night; muscle cramps at night; edema; periorbital edema, especially in the morning; nausea and vomiting; itchy skin (pruritus); weight gain or decreased urine output.   -CM encouraged Ameena to not keep junk foods on hand so he will not be tempted to eat them.    -CM reviewed a 2300 mg sodium diet that he should be following.    -CM commended Ameena for using Mrs Hernandez and told her about the no salt Krystian Chachere's in case she wants to try cajun seasoning.      SDOH completed by Claudia MENDOZA and reviewed by this CM.  No needs noted at this time.    Next steps:   Follow up if received educational materials  Follow up on S&S of CKD  Follow up if talked to Dr. Langston about Watchman device  Follow up on nephrology appointment on 1/23 with NP  Follow up on low 2300 mg sodium diet  Follow up if cooking without salt still  Follow up if got no salt Krystian's  Educate on self care

## 2025-01-16 ENCOUNTER — LAB VISIT (OUTPATIENT)
Dept: LAB | Facility: HOSPITAL | Age: 85
End: 2025-01-16
Attending: INTERNAL MEDICINE
Payer: MEDICARE

## 2025-01-16 DIAGNOSIS — Z95.2 H/O MECHANICAL AORTIC VALVE REPLACEMENT: ICD-10-CM

## 2025-01-16 DIAGNOSIS — Z79.01 CHRONIC ANTICOAGULATION: ICD-10-CM

## 2025-01-16 LAB
INR PPP: 2 (ref 0.8–1.2)
PROTHROMBIN TIME: 21.2 SEC (ref 9–12.5)

## 2025-01-16 PROCEDURE — 85610 PROTHROMBIN TIME: CPT | Mod: HCNC | Performed by: INTERNAL MEDICINE

## 2025-01-16 PROCEDURE — 36415 COLL VENOUS BLD VENIPUNCTURE: CPT | Mod: HCNC | Performed by: INTERNAL MEDICINE

## 2025-01-17 ENCOUNTER — ANTI-COAG VISIT (OUTPATIENT)
Dept: CARDIOLOGY | Facility: CLINIC | Age: 85
End: 2025-01-17
Payer: MEDICARE

## 2025-01-17 DIAGNOSIS — Z79.01 CHRONIC ANTICOAGULATION: Primary | ICD-10-CM

## 2025-01-17 DIAGNOSIS — Z95.2 H/O MECHANICAL AORTIC VALVE REPLACEMENT: ICD-10-CM

## 2025-01-17 PROCEDURE — 93793 ANTICOAG MGMT PT WARFARIN: CPT | Mod: S$GLB,,,

## 2025-01-17 NOTE — PROGRESS NOTES
[facility]    2025 8:27 AM    Assessment/Plan:    Patient presents today with therapeutic INR.    Assessment of patient findings and chart review: Reviewed    Recommendation for patient's warfarin regimen: Continue current maintenance dose    Recommend repeat INR in 1 week  _________________________________________________________________    Dariel Devon Urbina (84 y.o.) is followed by the [facility].    Anticoagulation Summary  As of 2025      INR goal:  2.0-2.5   TTR:  66.8% (12.3 y)   INR used for dosin.0 (2025)   Warfarin maintenance plan:  2.5 mg (5 mg x 0.5) every Mon, Fri; 5 mg (5 mg x 1) all other days   Weekly warfarin total:  30 mg   Plan last modified:  Laurie Patino, PharmD (2025)   Next INR check:  2025   Target end date:  --    Indications    Chronic anticoagulation [Z79.01]  H/O mechanical aortic valve replacement [Z95.2]                 Anticoagulation Episode Summary       INR check location:  Clinic Lab    Preferred lab:  --    Send INR reminders to:  [facility] COUMADIN MONITORING POOL    Comments:  [facility] IM - Internal Med Clinic only Mon - Thu // carpel tunnel release  - target low end of range          Anticoagulation Care Providers       Provider Role Specialty Phone number    Devon Langston Jr., MD [facility] Internal Medicine [phone]

## 2025-01-27 ENCOUNTER — OUTPATIENT CASE MANAGEMENT (OUTPATIENT)
Dept: ADMINISTRATIVE | Facility: OTHER | Age: 85
End: 2025-01-27
Payer: MEDICARE

## 2025-01-27 NOTE — PROGRESS NOTES
Outpatient Care Management  Plan of Care Follow Up Visit    Patient: Dariel Urbina  MRN: [id number]  Date of Service: 01/27/2025  Completed by: Michaela Tang RN  Referral Date: 12/16/2024    Reason for Visit   Patient presents with    OPCM RN Follow Up Call       Brief Summary: Ameena states the patient still has a nose bleed every morning when he wakes up for the day.  She states he will not go to the emergency room because he is doing the same things that they would do.  He states he would go if there was something else they could do for him.  Ameena states she has not spoken to the doctor about a watchman device yet.  She states she does not really know what CKD is.  She also states that the signs and symptoms of CKD are lack of eating, losing weight for no reason, and not urinating a lot. She states that the patient has lost weight over the last six months and he weighs himself at home but she is not sure how much.  Patient's spouse, Ameena, states that she is still cooking without salt.  She states the patient eats the same thing every morning for breakfast, a biscuit with jelly and a cup of coffee.  She states he then does not eat anything until around 2:00 when she gets hungry and fixes him something at the same time.  She states if she did not give him food at this time and have him eat, he would just go the rest of the day without eating. She states he does eat two cookies with milk in the evening time but does not eat more than two.     -CM encouraged Ameena to contact the patient's cardiologist, Dr. Espitia, to find out if the patient qualifies for a watchman device for his A Fib as soon as possible.    -CM explained that CKD is a wide spectrum of kidney conditions ranging from decreased function requiring monitoring to serious impairment requiring dialysis or renal transplant. With advanced chronic kidney disease, dangerous levels of fluid, electrolytes and waste can accumulate, causing kidney  damage. Damage can slowly occur and worsen over many years. Symptoms may not become apparent until the disease is far advanced.   -CM also explained that diabetes and hypertension are common causes.  -CM educated Ameena that the patient should be weighing himself every morning after urinating, and before eating breakfast because of his history of CHF.  -CM educated Ameena on the 3 lb/5 lb rule and that the patient should write his weights down to compare them and notify the doctor accordingly.  -CM reiterated the signs and symptoms of CKD including anorexia; fatigue; weakness; difficulty sleeping; confusion; frequent urination, especially at night; muscle cramps at night; edema; periorbital edema, especially in the morning; nausea and vomiting; itchy skin (pruritus); weight gain or decreased urine output.  -CM educated Ameena on [employer]'s Perfect Pinch Salt Free Seasoning.  CM informed patient that this is another seasoning she can try for the patient to see if he likes his food again.      [facility] Welcome letter resent along with educational materials to the patient's home.    Next steps:   Follow up if received educational materials  Follow up on S&S of CKD  Follow up if talked to Dr. Espitia about Watchman device  Follow up on low 2300 mg sodium diet  Follow up if patient eating more  Follow up if got no salt Krystian's and Belle's Perfect Pinch seasoning  Follow up on self care

## 2025-01-27 NOTE — LETTER
Dariel Urbina  [address] [facility] [address]    Dear Dariel Urbina,     Welcome to [facility]. We are here to assist patients with multiple long-term (chronic) conditions who often need more personalized healthcare.    It was a pleasure talking with you today. My name is Kimberlee Tang RN. I look forward to working with you as your Care Manager. I will be contacting you by telephone routinely to help coordinate care and resolve issues.    My goal is to help you function at the healthiest and highest level possible. You can contact me directly at [phone].    As an [facility] patient with [employer], some of the services we provide, at no cost to you, include:     Development of an individualized care plan with a Registered Nurse   Connection with a   Assistance from a Community Health Worker  Connection with available resources and services    Coordinate communication among your care team members   Provide coaching and education  Help you understand your doctor's treatment plan  Help you obtain information about your insurance coverage.    All services provided by [facility] Outpatient Care Managers and other care team members are coordinated with and communicated to your primary care team.      As part of your enrollment, you will be receiving education materials and more information about these services in your [facility] account, by phone, or through the mail. If you do not wish to participate or receive information, you can Opt Out by contacting our office at [phone].      Sincerely,    Kimberlee Tang RN  [facility]   Outpatient Care Management

## 2025-01-27 NOTE — PROGRESS NOTES
Pt wife reports having a nosebleed for about 2.5 hours now, not a steady bleed. It is finally under control after applying pressure and using afrin. Pt wife states she will try to have pt have INR done this afternoon, she did not want him driving while trying to stop bleed.

## 2025-01-28 ENCOUNTER — LAB VISIT (OUTPATIENT)
Dept: LAB | Facility: HOSPITAL | Age: 85
End: 2025-01-28
Payer: MEDICARE

## 2025-01-28 ENCOUNTER — TELEPHONE (OUTPATIENT)
Dept: CARDIOLOGY | Facility: CLINIC | Age: 85
End: 2025-01-28
Payer: MEDICARE

## 2025-01-28 DIAGNOSIS — Z79.01 CHRONIC ANTICOAGULATION: ICD-10-CM

## 2025-01-28 DIAGNOSIS — Z95.2 H/O MECHANICAL AORTIC VALVE REPLACEMENT: ICD-10-CM

## 2025-01-28 LAB
INR PPP: 3.5 (ref 0.8–1.2)
PROTHROMBIN TIME: 35.5 SEC (ref 9–12.5)

## 2025-01-28 PROCEDURE — 36415 COLL VENOUS BLD VENIPUNCTURE: CPT | Mod: HCNC | Performed by: INTERNAL MEDICINE

## 2025-01-28 PROCEDURE — 85610 PROTHROMBIN TIME: CPT | Mod: HCNC | Performed by: INTERNAL MEDICINE

## 2025-01-28 NOTE — TELEPHONE ENCOUNTER
Returned a call for Mr Urbina, to get him scheduled to see Dr Espitia. Spoke to pts wife. Mrs Urbina stated that pt always has SOB. They really just want to speak to the Dr to get info on a new device. I advised her I have March 6th appt. And I added him to the wait list. She agreed and thanked me for reaching out.    Thank you,    Fatou Fay  Medical Assistant   [facility]  [phone]-Phone  [phone]-Fax

## 2025-01-29 ENCOUNTER — ANTI-COAG VISIT (OUTPATIENT)
Dept: CARDIOLOGY | Facility: CLINIC | Age: 85
End: 2025-01-29
Payer: MEDICARE

## 2025-01-29 DIAGNOSIS — Z95.2 H/O MECHANICAL AORTIC VALVE REPLACEMENT: ICD-10-CM

## 2025-01-29 DIAGNOSIS — Z79.01 CHRONIC ANTICOAGULATION: Primary | ICD-10-CM

## 2025-01-29 PROCEDURE — 93793 ANTICOAG MGMT PT WARFARIN: CPT | Mod: S$GLB,,,

## 2025-01-29 NOTE — PROGRESS NOTES
[facility]    01/29/2025 10:36 AM    Assessment/Plan:    Patient presents today with supratherapeutic INR.    Assessment of patient findings and chart review: See findings.  Pt took warfarin yesterday.     Recommendation for patient's warfarin regimen: Per calendar.     Recommend repeat INR in 1 week  _________________________________________________________________    Dariel Devon Urbina (84 y.o.) is followed by the [facility].    Anticoagulation Summary  As of 1/29/2025      INR goal:  2.0-2.5   TTR:  66.7% (12.4 y)   INR used for dosing:  3.5 (1/28/2025)   Warfarin maintenance plan:  2.5 mg (5 mg x 0.5) every Mon, Fri; 5 mg (5 mg x 1) all other days   Weekly warfarin total:  30 mg   Plan last modified:  Laurie Patino, PharmD (1/2/2025)   Next INR check:  2/5/2025   Target end date:  --    Indications    Chronic anticoagulation [Z79.01]  H/O mechanical aortic valve replacement [Z95.2]                 Anticoagulation Episode Summary       INR check location:  Clinic Lab    Preferred lab:  --    Send INR reminders to:  [facility] COUMADIN MONITORING POOL    Comments:  [facility] IM - Internal Med Clinic only Mon - Thu // carpel tunnel release 5/19 - target low end of range          Anticoagulation Care Providers       Provider Role Specialty Phone number    Devon Langston Jr., MD [facility] Internal Medicine [phone]

## 2025-02-05 ENCOUNTER — ANTI-COAG VISIT (OUTPATIENT)
Dept: CARDIOLOGY | Facility: CLINIC | Age: 85
End: 2025-02-05
Payer: MEDICARE

## 2025-02-05 ENCOUNTER — LAB VISIT (OUTPATIENT)
Dept: LAB | Facility: HOSPITAL | Age: 85
End: 2025-02-05
Attending: INTERNAL MEDICINE
Payer: MEDICARE

## 2025-02-05 DIAGNOSIS — Z79.01 CHRONIC ANTICOAGULATION: ICD-10-CM

## 2025-02-05 DIAGNOSIS — Z79.01 CHRONIC ANTICOAGULATION: Primary | ICD-10-CM

## 2025-02-05 DIAGNOSIS — Z95.2 H/O MECHANICAL AORTIC VALVE REPLACEMENT: ICD-10-CM

## 2025-02-05 LAB
INR PPP: 1.6 (ref 0.8–1.2)
PROTHROMBIN TIME: 17.2 SEC (ref 9–12.5)

## 2025-02-05 PROCEDURE — 85610 PROTHROMBIN TIME: CPT | Mod: HCNC | Performed by: INTERNAL MEDICINE

## 2025-02-05 PROCEDURE — 93793 ANTICOAG MGMT PT WARFARIN: CPT | Mod: S$GLB,,,

## 2025-02-05 PROCEDURE — 36415 COLL VENOUS BLD VENIPUNCTURE: CPT | Mod: HCNC | Performed by: INTERNAL MEDICINE

## 2025-02-05 NOTE — PROGRESS NOTES
[facility]    2025 2:43 PM    Assessment/Plan:    Patient presents today with subtherapeutic  INR.    Assessment of patient findings and chart review: Pt likely subtherapeutic 2/2 to increase in vit k intake.     Recommendation for patient's warfarin regimen:   Increase per calendar   Recommend repeat INR in 1 week  _________________________________________________________________    Dariel Devon Urbina (84 y.o.) is followed by the [facility].    Anticoagulation Summary  As of 2025      INR goal:  2.0-2.5   TTR:  66.6% (12.4 y)   INR used for dosin.6 (2025)   Warfarin maintenance plan:  2.5 mg (5 mg x 0.5) every Mon, Fri; 5 mg (5 mg x 1) all other days   Weekly warfarin total:  30 mg   Plan last modified:  Laurie Patino, PharmD (2025)   Next INR check:  2025   Target end date:  --    Indications    Chronic anticoagulation [Z79.01]  H/O mechanical aortic valve replacement [Z95.2]                 Anticoagulation Episode Summary       INR check location:  Clinic Lab    Preferred lab:  --    Send INR reminders to:  [facility] COUMADIN MONITORING POOL    Comments:  [facility] IM - Internal Med Clinic only Mon - Thu // carpel tunnel release  - target low end of range          Anticoagulation Care Providers       Provider Role Specialty Phone number    Devon Langston Jr., MD [facility] Internal Medicine [phone]

## 2025-02-07 RX ORDER — TRAZODONE HYDROCHLORIDE 50 MG/1
50 TABLET ORAL NIGHTLY
Qty: 90 TABLET | Refills: 2 | Status: ON HOLD | OUTPATIENT
Start: 2025-02-07

## 2025-02-07 NOTE — TELEPHONE ENCOUNTER
No care due was identified.  [facility] Embedded Care Due Messages. Reference number: [id number].   2/07/2025 10:15:12 AM CST

## 2025-02-07 NOTE — TELEPHONE ENCOUNTER
----- Message from Reny sent at 2/7/2025 10:09 AM CST -----  Contact: [phone]  Requesting an RX refill or new RX.    Is this a refill or new RX: Refill 1    RX name and strength (copy/paste from chart):  traZODone (DESYREL) 50 MG tablet    Is this a 30 day or 90 day RX:     Pharmacy name and phone # (copy/paste from chart): [facility]/pharmacy #[id number] - [place], [place] - [address]   Phone: [phone]  Fax: [phone]           The doctors have asked that we provide their patients with the following 2 reminders -- prescription refills can take up to 72 hours, and a friendly reminder that in the future you can use your [facility] account to request refills:

## 2025-02-12 ENCOUNTER — ANTI-COAG VISIT (OUTPATIENT)
Dept: CARDIOLOGY | Facility: CLINIC | Age: 85
End: 2025-02-12
Payer: MEDICARE

## 2025-02-12 ENCOUNTER — LAB VISIT (OUTPATIENT)
Dept: LAB | Facility: HOSPITAL | Age: 85
End: 2025-02-12
Attending: INTERNAL MEDICINE
Payer: MEDICARE

## 2025-02-12 DIAGNOSIS — Z95.2 H/O MECHANICAL AORTIC VALVE REPLACEMENT: ICD-10-CM

## 2025-02-12 DIAGNOSIS — Z79.01 CHRONIC ANTICOAGULATION: ICD-10-CM

## 2025-02-12 DIAGNOSIS — Z79.01 CHRONIC ANTICOAGULATION: Primary | ICD-10-CM

## 2025-02-12 LAB
INR PPP: 3 (ref 0.8–1.2)
PROTHROMBIN TIME: 30.3 SEC (ref 9–12.5)

## 2025-02-12 PROCEDURE — 85610 PROTHROMBIN TIME: CPT | Mod: HCNC | Performed by: INTERNAL MEDICINE

## 2025-02-12 PROCEDURE — 93793 ANTICOAG MGMT PT WARFARIN: CPT | Mod: S$GLB,,,

## 2025-02-12 PROCEDURE — 36415 COLL VENOUS BLD VENIPUNCTURE: CPT | Mod: HCNC | Performed by: INTERNAL MEDICINE

## 2025-02-12 NOTE — PROGRESS NOTES
[facility]    02/12/2025 11:57 AM    Assessment/Plan:    Patient presents today with supratherapeutic INR.    Assessment of patient findings and chart review: Reviewed    Recommendation for patient's warfarin regimen: Lower dose today to 2.5mg then resume current maintenance dose    Recommend repeat INR in 1 week  _________________________________________________________________    Dariel Devon Urbina (84 y.o.) is followed by the [facility].    Anticoagulation Summary  As of 2/12/2025      INR goal:  2.0-2.5   TTR:  66.6% (12.4 y)   INR used for dosing:  3.0 (2/12/2025)   Warfarin maintenance plan:  2.5 mg (5 mg x 0.5) every Mon, Fri; 5 mg (5 mg x 1) all other days   Weekly warfarin total:  30 mg   Plan last modified:  Laurie Patino, PharmD (1/2/2025)   Next INR check:  2/18/2025   Target end date:  --    Indications    Chronic anticoagulation [Z79.01]  H/O mechanical aortic valve replacement [Z95.2]                 Anticoagulation Episode Summary       INR check location:  Clinic Lab    Preferred lab:  --    Send INR reminders to:  [facility] COUMADIN MONITORING POOL    Comments:  [facility] - Internal Med Clinic only Mon - Thu // carpel tunnel release 5/19 - target low end of range          Anticoagulation Care Providers       Provider Role Specialty Phone number    Devon Langston Jr., MD [facility] Internal Medicine [phone]

## 2025-02-19 ENCOUNTER — LAB VISIT (OUTPATIENT)
Dept: LAB | Facility: HOSPITAL | Age: 85
End: 2025-02-19
Attending: INTERNAL MEDICINE
Payer: MEDICARE

## 2025-02-19 ENCOUNTER — ANTI-COAG VISIT (OUTPATIENT)
Dept: CARDIOLOGY | Facility: CLINIC | Age: 85
End: 2025-02-19
Payer: MEDICARE

## 2025-02-19 DIAGNOSIS — Z95.2 H/O MECHANICAL AORTIC VALVE REPLACEMENT: ICD-10-CM

## 2025-02-19 DIAGNOSIS — Z79.01 CHRONIC ANTICOAGULATION: Primary | ICD-10-CM

## 2025-02-19 DIAGNOSIS — Z79.01 CHRONIC ANTICOAGULATION: ICD-10-CM

## 2025-02-19 LAB
INR PPP: 3.7 (ref 0.8–1.2)
PROTHROMBIN TIME: 36.9 SEC (ref 9–12.5)

## 2025-02-19 PROCEDURE — 36415 COLL VENOUS BLD VENIPUNCTURE: CPT | Mod: HCNC | Performed by: INTERNAL MEDICINE

## 2025-02-19 PROCEDURE — 85610 PROTHROMBIN TIME: CPT | Mod: HCNC | Performed by: INTERNAL MEDICINE

## 2025-02-19 NOTE — PROGRESS NOTES
[facility]    02/19/2025 12:59 PM    Assessment/Plan:    Patient presents today with supratherapeutic INR.    Assessment of patient findings and chart review: Pt denies any changes     Recommendation for patient's warfarin regimen: Hold dose today then decrease maintenance dose    Recommend repeat INR in 1 week  _________________________________________________________________    Dariel Devon Urbina (84 y.o.) is followed by the [facility].    Anticoagulation Summary  As of 2/19/2025      INR goal:  2.0-2.5   TTR:  66.5% (12.4 y)   INR used for dosing:  3.7 (2/19/2025)   Warfarin maintenance plan:  2.5 mg (5 mg x 0.5) every Mon, Wed, Fri; 5 mg (5 mg x 1) all other days   Weekly warfarin total:  27.5 mg   Plan last modified:  Laurie Patino, PharmD (2/19/2025)   Next INR check:  --   Target end date:  --    Indications    Chronic anticoagulation [Z79.01]  H/O mechanical aortic valve replacement [Z95.2]                 Anticoagulation Episode Summary       INR check location:  Clinic Lab    Preferred lab:  --    Send INR reminders to:  [facility] COUMADIN MONITORING POOL    Comments:  [facility] IM - Internal Med Clinic only Mon - Thu // carpel tunnel release 5/19 - target low end of range          Anticoagulation Care Providers       Provider Role Specialty Phone number    Devon Langston Jr., MD [facility] Internal Medicine [phone]

## 2025-02-24 ENCOUNTER — HOSPITAL ENCOUNTER (INPATIENT)
Facility: HOSPITAL | Age: 85
LOS: 10 days | Discharge: HOME | DRG: 811 | End: 2025-03-07
Attending: EMERGENCY MEDICINE
Payer: MEDICARE

## 2025-02-24 ENCOUNTER — TELEPHONE (OUTPATIENT)
Dept: OTOLARYNGOLOGY | Facility: CLINIC | Age: 85
End: 2025-02-24
Payer: MEDICARE

## 2025-02-24 DIAGNOSIS — D62 ACUTE BLOOD LOSS ANEMIA: ICD-10-CM

## 2025-02-24 DIAGNOSIS — Z79.01 ANTICOAGULANT LONG-TERM USE: ICD-10-CM

## 2025-02-24 DIAGNOSIS — Z95.2 H/O MECHANICAL AORTIC VALVE REPLACEMENT: ICD-10-CM

## 2025-02-24 DIAGNOSIS — R07.9 CHEST PAIN: ICD-10-CM

## 2025-02-24 DIAGNOSIS — R04.0 EPISTAXIS: Primary | ICD-10-CM

## 2025-02-24 DIAGNOSIS — R94.31 QT PROLONGATION: ICD-10-CM

## 2025-02-24 PROBLEM — Z87.898 HISTORY OF EPISTAXIS: Status: ACTIVE | Noted: 2025-02-24

## 2025-02-24 PROBLEM — R06.2 WHEEZES: Status: ACTIVE | Noted: 2025-02-24

## 2025-02-24 LAB
ABO + RH BLD: NORMAL
ALBUMIN SERPL BCP-MCNC: 3 G/DL (ref 3.5–5.2)
ALP SERPL-CCNC: 71 U/L (ref 40–150)
ALT SERPL W/O P-5'-P-CCNC: 14 U/L (ref 10–44)
ANION GAP SERPL CALC-SCNC: 10 MMOL/L (ref 8–16)
APTT PPP: 42.1 SEC (ref 21–32)
AST SERPL-CCNC: 27 U/L (ref 10–40)
BASOPHILS # BLD AUTO: 0.02 K/UL (ref 0–0.2)
BASOPHILS # BLD AUTO: 0.03 K/UL (ref 0–0.2)
BASOPHILS NFR BLD: 0.4 % (ref 0–1.9)
BASOPHILS NFR BLD: 0.5 % (ref 0–1.9)
BILIRUB SERPL-MCNC: 0.3 MG/DL (ref 0.1–1)
BLD GP AB SCN CELLS X3 SERPL QL: NORMAL
BLD PROD TYP BPU: NORMAL
BLOOD UNIT EXPIRATION DATE: NORMAL
BLOOD UNIT TYPE CODE: 9500
BLOOD UNIT TYPE: NORMAL
BUN SERPL-MCNC: 47 MG/DL (ref 8–23)
CALCIUM SERPL-MCNC: 9.6 MG/DL (ref 8.7–10.5)
CHLORIDE SERPL-SCNC: 111 MMOL/L (ref 95–110)
CO2 SERPL-SCNC: 21 MMOL/L (ref 23–29)
CODING SYSTEM: NORMAL
CREAT SERPL-MCNC: 2.5 MG/DL (ref 0.5–1.4)
CROSSMATCH INTERPRETATION: NORMAL
DIFFERENTIAL METHOD BLD: ABNORMAL
DIFFERENTIAL METHOD BLD: ABNORMAL
DISPENSE STATUS: NORMAL
EOSINOPHIL # BLD AUTO: 0.2 K/UL (ref 0–0.5)
EOSINOPHIL # BLD AUTO: 0.2 K/UL (ref 0–0.5)
EOSINOPHIL NFR BLD: 3 % (ref 0–8)
EOSINOPHIL NFR BLD: 3.4 % (ref 0–8)
ERYTHROCYTE [DISTWIDTH] IN BLOOD BY AUTOMATED COUNT: 14.7 % (ref 11.5–14.5)
ERYTHROCYTE [DISTWIDTH] IN BLOOD BY AUTOMATED COUNT: 14.8 % (ref 11.5–14.5)
EST. GFR  (NO RACE VARIABLE): 24.9 ML/MIN/1.73 M^2
GLUCOSE SERPL-MCNC: 100 MG/DL (ref 70–110)
HCT VFR BLD AUTO: 22.2 % (ref 40–54)
HCT VFR BLD AUTO: 24.2 % (ref 40–54)
HCV AB SERPL QL IA: NORMAL
HGB BLD-MCNC: 6.7 G/DL (ref 14–18)
HGB BLD-MCNC: 7.2 G/DL (ref 14–18)
HIV 1+2 AB+HIV1 P24 AG SERPL QL IA: NORMAL
IMM GRANULOCYTES # BLD AUTO: 0.02 K/UL (ref 0–0.04)
IMM GRANULOCYTES # BLD AUTO: 0.02 K/UL (ref 0–0.04)
IMM GRANULOCYTES NFR BLD AUTO: 0.3 % (ref 0–0.5)
IMM GRANULOCYTES NFR BLD AUTO: 0.4 % (ref 0–0.5)
INFLUENZA A, MOLECULAR: NOT DETECTED
INFLUENZA B, MOLECULAR: NOT DETECTED
INR PPP: 3.8 (ref 0.8–1.2)
LYMPHOCYTES # BLD AUTO: 0.6 K/UL (ref 1–4.8)
LYMPHOCYTES # BLD AUTO: 0.8 K/UL (ref 1–4.8)
LYMPHOCYTES NFR BLD: 12.3 % (ref 18–48)
LYMPHOCYTES NFR BLD: 13.2 % (ref 18–48)
MCH RBC QN AUTO: 28.1 PG (ref 27–31)
MCH RBC QN AUTO: 29.3 PG (ref 27–31)
MCHC RBC AUTO-ENTMCNC: 29.8 G/DL (ref 32–36)
MCHC RBC AUTO-ENTMCNC: 30.2 G/DL (ref 32–36)
MCV RBC AUTO: 95 FL (ref 82–98)
MCV RBC AUTO: 97 FL (ref 82–98)
MONOCYTES # BLD AUTO: 0.4 K/UL (ref 0.3–1)
MONOCYTES # BLD AUTO: 0.5 K/UL (ref 0.3–1)
MONOCYTES NFR BLD: 7.6 % (ref 4–15)
MONOCYTES NFR BLD: 8.5 % (ref 4–15)
NEUTROPHILS # BLD AUTO: 3.7 K/UL (ref 1.8–7.7)
NEUTROPHILS # BLD AUTO: 4.6 K/UL (ref 1.8–7.7)
NEUTROPHILS NFR BLD: 75 % (ref 38–73)
NEUTROPHILS NFR BLD: 75.4 % (ref 38–73)
NRBC BLD-RTO: 0 /100 WBC
NRBC BLD-RTO: 0 /100 WBC
PLATELET # BLD AUTO: 140 K/UL (ref 150–450)
PLATELET # BLD AUTO: 153 K/UL (ref 150–450)
PMV BLD AUTO: 10.4 FL (ref 9.2–12.9)
PMV BLD AUTO: 10.4 FL (ref 9.2–12.9)
POTASSIUM SERPL-SCNC: 3.7 MMOL/L (ref 3.5–5.1)
PROT SERPL-MCNC: 6.9 G/DL (ref 6–8.4)
PROTHROMBIN TIME: 38.1 SEC (ref 9–12.5)
RBC # BLD AUTO: 2.29 M/UL (ref 4.6–6.2)
RBC # BLD AUTO: 2.56 M/UL (ref 4.6–6.2)
RSV AG BY MOLECULAR METHOD: NOT DETECTED
SARS-COV-2 RNA RESP QL NAA+PROBE: NOT DETECTED
SODIUM SERPL-SCNC: 142 MMOL/L (ref 136–145)
SPECIMEN OUTDATE: NORMAL
TRANS ERYTHROCYTES VOL PATIENT: NORMAL ML
WBC # BLD AUTO: 4.94 K/UL (ref 3.9–12.7)
WBC # BLD AUTO: 6.05 K/UL (ref 3.9–12.7)

## 2025-02-24 PROCEDURE — P9021 RED BLOOD CELLS UNIT: HCPCS | Mod: HCNC

## 2025-02-24 PROCEDURE — 80053 COMPREHEN METABOLIC PANEL: CPT | Mod: HCNC

## 2025-02-24 PROCEDURE — 25000242 PHARM REV CODE 250 ALT 637 W/ HCPCS: Mod: HCNC

## 2025-02-24 PROCEDURE — 87389 HIV-1 AG W/HIV-1&-2 AB AG IA: CPT | Mod: HCNC | Performed by: PHYSICIAN ASSISTANT

## 2025-02-24 PROCEDURE — 86803 HEPATITIS C AB TEST: CPT | Mod: HCNC | Performed by: PHYSICIAN ASSISTANT

## 2025-02-24 PROCEDURE — 85025 COMPLETE CBC W/AUTO DIFF WBC: CPT | Mod: HCNC

## 2025-02-24 PROCEDURE — 99285 EMERGENCY DEPT VISIT HI MDM: CPT | Mod: 25,HCNC

## 2025-02-24 PROCEDURE — 30233N0 TRANSFUSION OF AUTOLOGOUS RED BLOOD CELLS INTO PERIPHERAL VEIN, PERCUTANEOUS APPROACH: ICD-10-PCS

## 2025-02-24 PROCEDURE — 25000003 PHARM REV CODE 250: Mod: HCNC

## 2025-02-24 PROCEDURE — 85610 PROTHROMBIN TIME: CPT | Mod: HCNC

## 2025-02-24 PROCEDURE — 94640 AIRWAY INHALATION TREATMENT: CPT | Mod: HCNC

## 2025-02-24 PROCEDURE — 86850 RBC ANTIBODY SCREEN: CPT | Mod: HCNC | Performed by: EMERGENCY MEDICINE

## 2025-02-24 PROCEDURE — G0378 HOSPITAL OBSERVATION PER HR: HCPCS | Mod: HCNC

## 2025-02-24 PROCEDURE — 86920 COMPATIBILITY TEST SPIN: CPT | Mod: HCNC

## 2025-02-24 PROCEDURE — 0241U SARS-COV2 (COVID) WITH FLU/RSV BY PCR: CPT | Mod: HCNC | Performed by: EMERGENCY MEDICINE

## 2025-02-24 PROCEDURE — 85730 THROMBOPLASTIN TIME PARTIAL: CPT | Mod: HCNC

## 2025-02-24 PROCEDURE — 82962 GLUCOSE BLOOD TEST: CPT | Mod: HCNC

## 2025-02-24 PROCEDURE — 85025 COMPLETE CBC W/AUTO DIFF WBC: CPT | Mod: 91,HCNC

## 2025-02-24 PROCEDURE — 36430 TRANSFUSION BLD/BLD COMPNT: CPT | Mod: HCNC

## 2025-02-24 RX ORDER — OXYMETAZOLINE HCL 0.05 %
2 SPRAY, NON-AEROSOL (ML) NASAL
Status: DISPENSED | OUTPATIENT
Start: 2025-02-24 | End: 2025-02-27

## 2025-02-24 RX ORDER — IPRATROPIUM BROMIDE AND ALBUTEROL SULFATE 2.5; .5 MG/3ML; MG/3ML
3 SOLUTION RESPIRATORY (INHALATION) EVERY 4 HOURS PRN
Status: DISCONTINUED | OUTPATIENT
Start: 2025-02-24 | End: 2025-02-24

## 2025-02-24 RX ORDER — GLUCAGON 1 MG
1 KIT INJECTION
Status: DISCONTINUED | OUTPATIENT
Start: 2025-02-24 | End: 2025-03-07 | Stop reason: HOSPADM

## 2025-02-24 RX ORDER — INSULIN ASPART 100 [IU]/ML
0-5 INJECTION, SOLUTION INTRAVENOUS; SUBCUTANEOUS
Status: DISCONTINUED | OUTPATIENT
Start: 2025-02-24 | End: 2025-03-07 | Stop reason: HOSPADM

## 2025-02-24 RX ORDER — TRAZODONE HYDROCHLORIDE 50 MG/1
50 TABLET ORAL NIGHTLY
Status: DISCONTINUED | OUTPATIENT
Start: 2025-02-24 | End: 2025-03-07 | Stop reason: HOSPADM

## 2025-02-24 RX ORDER — ALLOPURINOL 100 MG/1
100 TABLET ORAL DAILY
Status: DISCONTINUED | OUTPATIENT
Start: 2025-02-24 | End: 2025-02-24

## 2025-02-24 RX ORDER — IBUPROFEN 200 MG
16 TABLET ORAL
Status: DISCONTINUED | OUTPATIENT
Start: 2025-02-24 | End: 2025-02-24

## 2025-02-24 RX ORDER — IBUPROFEN 200 MG
16 TABLET ORAL
Status: DISCONTINUED | OUTPATIENT
Start: 2025-02-24 | End: 2025-03-07 | Stop reason: HOSPADM

## 2025-02-24 RX ORDER — GLUCAGON 1 MG
1 KIT INJECTION
Status: DISCONTINUED | OUTPATIENT
Start: 2025-02-24 | End: 2025-02-24

## 2025-02-24 RX ORDER — IBUPROFEN 200 MG
24 TABLET ORAL
Status: DISCONTINUED | OUTPATIENT
Start: 2025-02-24 | End: 2025-02-24

## 2025-02-24 RX ORDER — ATORVASTATIN CALCIUM 40 MG/1
80 TABLET, FILM COATED ORAL DAILY
Status: DISCONTINUED | OUTPATIENT
Start: 2025-02-24 | End: 2025-03-07 | Stop reason: HOSPADM

## 2025-02-24 RX ORDER — NALOXONE HCL 0.4 MG/ML
0.02 VIAL (ML) INJECTION
Status: DISCONTINUED | OUTPATIENT
Start: 2025-02-24 | End: 2025-03-07 | Stop reason: HOSPADM

## 2025-02-24 RX ORDER — ISOSORBIDE MONONITRATE 60 MG/1
60 TABLET, EXTENDED RELEASE ORAL NIGHTLY
Status: DISCONTINUED | OUTPATIENT
Start: 2025-02-24 | End: 2025-03-07 | Stop reason: HOSPADM

## 2025-02-24 RX ORDER — IBUPROFEN 200 MG
24 TABLET ORAL
Status: DISCONTINUED | OUTPATIENT
Start: 2025-02-24 | End: 2025-03-07 | Stop reason: HOSPADM

## 2025-02-24 RX ORDER — SODIUM CHLORIDE 0.9 % (FLUSH) 0.9 %
10 SYRINGE (ML) INJECTION EVERY 12 HOURS PRN
Status: DISCONTINUED | OUTPATIENT
Start: 2025-02-24 | End: 2025-03-07 | Stop reason: HOSPADM

## 2025-02-24 RX ORDER — IPRATROPIUM BROMIDE AND ALBUTEROL SULFATE 2.5; .5 MG/3ML; MG/3ML
3 SOLUTION RESPIRATORY (INHALATION)
Status: DISCONTINUED | OUTPATIENT
Start: 2025-02-24 | End: 2025-02-27

## 2025-02-24 RX ORDER — HYDROCODONE BITARTRATE AND ACETAMINOPHEN 500; 5 MG/1; MG/1
TABLET ORAL
Status: DISCONTINUED | OUTPATIENT
Start: 2025-02-24 | End: 2025-02-26

## 2025-02-24 RX ADMIN — ATORVASTATIN CALCIUM 80 MG: 40 TABLET, FILM COATED ORAL at 03:02

## 2025-02-24 RX ADMIN — IPRATROPIUM BROMIDE AND ALBUTEROL SULFATE 3 ML: 2.5; .5 SOLUTION RESPIRATORY (INHALATION) at 09:02

## 2025-02-24 RX ADMIN — ISOSORBIDE MONONITRATE 60 MG: 60 TABLET, EXTENDED RELEASE ORAL at 09:02

## 2025-02-24 RX ADMIN — TRAZODONE HYDROCHLORIDE 50 MG: 50 TABLET ORAL at 09:02

## 2025-02-24 NOTE — SUBJECTIVE & OBJECTIVE
Medications:  Continuous Infusions:  Scheduled Meds:  PRN Meds:  Current Facility-Administered Medications:     0.9%  NaCl infusion (for blood administration), , Intravenous, Q24H PRN     No current facility-administered medications on file prior to encounter.     Current Outpatient Medications on File Prior to Encounter   Medication Sig    acetaminophen (TYLENOL) 500 MG tablet Take 500 mg by mouth daily as needed for Pain.    albuterol (VENTOLIN HFA) 90 mcg/actuation inhaler Inhale 2 puffs into the lungs every 6 (six) hours as needed for Wheezing. Rescue    allopurinoL (ZYLOPRIM) 100 MG tablet Take 1 tablet (100 mg total) by mouth once daily.    bumetanide (BUMEX) 1 MG tablet Take 2 tablets (2 mg total) by mouth every other day.    ferrous gluconate (FERGON) 324 MG tablet TAKE 1 TABLET EVERY DAY WITH BREAKFAST    isosorbide mononitrate (IMDUR) 60 MG 24 hr tablet Take 1 tablet (60 mg total) by mouth every evening.    omega-3 fatty acids/fish oil (FISH OIL-OMEGA-3 FATTY ACIDS) 300-1,000 mg capsule Take 1 capsule by mouth once daily.    oxymetazoline (AFRIN) 0.05 % nasal spray 2 sprays by Nasal route as needed (nose bleeds).    rosuvastatin (CRESTOR) 40 MG Tab TAKE 1 TABLET EVERY EVENING.    sodium chloride (SALINE NASAL) 0.65 % nasal spray Use 2 sprays by Nasal route every 4 hours. Apply into nasal cavities daily with nightly application of vaseline/aquaphor to anterior nares. Keep head of bed elevated.    traZODone (DESYREL) 50 MG tablet Take 1 tablet (50 mg total) by mouth every evening. As needed    warfarin (COUMADIN) 5 MG tablet TAKE 1/2 TABLET (2.5MG) SATURDAY, THEN TAKE 1 TABLET (5MG) ALL OTHER DAYS OR AS INSTRUCTED BY COUMADIN CLINIC       Review of patient's allergies indicates:   Allergen Reactions    Lisinopril     Losartan      Intolerance- elevates potassium level       Past Medical History:   Diagnosis Date    Anemia of chronic renal failure, stage 3 (moderate) 05/27/2015    Anticoagulant long-term use      Atherosclerosis of coronary artery bypass graft of native heart without angina pectoris 09/11/2012    3-27-18 [facility] Two vessel coronary artery disease.   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    Bilateral carotid artery disease 02/09/2017    Bleeding from the nose     Bleeding nose 03/21/2018    Cancer     Cataract     CHF (congestive heart failure)     CKD (chronic kidney disease) stage 3, GFR 30-59 ml/min 05/27/2015    Claudication of left lower extremity 09/17/2014    Colon polyp     Encounter for blood transfusion     Gastroesophageal reflux disease without esophagitis 03/19/2018    Gastrointestinal hemorrhage associated with intestinal diverticulosis 04/01/2018    Glaucoma     H/O mechanical aortic valve replacement 09/17/2014    History of gout 09/26/2012    Hyperparathyroidism due to renal insufficiency 07/27/2015    Internal hemorrhoid 04/03/2018    Long term current use of anticoagulant therapy 09/26/2012    Mechanical heart valve present     Metabolic acidosis with normal anion gap and bicarbonate losses 03/20/2018    Mixed hyperlipidemia 09/26/2012    NSTEMI (non-ST elevated myocardial infarction) 03/21/2018    Obesity, diabetes, and hypertension syndrome 02/23/2016    Paroxysmal atrial fibrillation 02/06/2023    PVD (peripheral vascular disease) 09/11/2012    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     Past Surgical History:   Procedure Laterality Date    BACK SURGERY      CARDIAC CATHETERIZATION      CARDIAC VALVE REPLACEMENT      CARDIAC VALVE SURGERY      CARPAL TUNNEL RELEASE Right 05/19/2020    Procedure: RELEASE, CARPAL TUNNEL;  Surgeon: Rupesh CAPONE  Jr Awad MD;  Location: [facility];  Service: Plastics;  Laterality: Right;    CATARACT EXTRACTION Left 11/13/2022        COLON SURGERY      COLONOSCOPY N/A 03/31/2017    Procedure: COLONOSCOPY;  Surgeon: Bruno Raymond MD;  Location: [facility] ENDO (4TH FLR);  Service: Endoscopy;  Laterality: N/A;  Patient's wife requesting date.    COLONOSCOPY N/A 04/03/2018    Procedure: COLONOSCOPY;  Surgeon: Bonifacio Pelletier MD;  Location: [facility] ENDO (2ND FLR);  Service: Endoscopy;  Laterality: N/A;    COLONOSCOPY N/A 08/13/2018    Procedure: COLONOSCOPY;  Surgeon: Kam Barba MD;  Location: [facility] ENDO (2ND FLR);  Service: Endoscopy;  Laterality: N/A;  2nd floor: PA pressure 49; hx of moderate-severe valve disease     per Coumadin clinic-Patient can hold 5 days with lovenox bridge       ok to schedule per [facility]    CONTROL OF EPISTAXIS, POSTERIOR, USING NASAL PACKING OR CAUTERIZATION Bilateral 6/18/2024    Procedure: CONTROL OF EPISTAXIS, POSTERIOR, USING NASAL PACKING OR CAUTERIZATION;  Surgeon: Jono Russell MD;  [address]  Service: ENT;  Laterality: Bilateral;    CONTROL OF EPISTAXIS, POSTERIOR, USING NASAL PACKING OR CAUTERIZATION Bilateral 8/8/2024    Procedure: CONTROL OF EPISTAXIS, POSTERIOR, USING NASAL PACKING OR CAUTERIZATION;  Surgeon: Jono Russell MD;  [address];  Service: ENT;  Laterality: Bilateral;  suction bovie, nasopore, endoscopes    CONTROL OF EPISTAXIS, POSTERIOR, USING NASAL PACKING OR CAUTERIZATION Bilateral 12/30/2024    Procedure: CONTROL OF EPISTAXIS, POSTERIOR, USING NASAL PACKING OR CAUTERIZATION;  Surgeon: Jono Russell MD;  [address];  Service: ENT;  Laterality: Bilateral;    CORONARY ANGIOGRAPHY N/A 10/04/2021    Procedure: Left heart cath +/- peripheral angiogram;  Surgeon: Jose Ruiz MD;  Location: [facility] CATH LAB;  Service: Cardiology;  Laterality: N/A;    CORONARY ANGIOGRAPHY N/A 3/2/2023    Procedure: Angiogram,  Coronary;  Surgeon: Devon Garnica MD;  Location: [facility] CATH LAB;  Service: Cardiology;  Laterality: N/A;    CORONARY ANGIOPLASTY      CORONARY ARTERY BYPASS GRAFT      CORONARY BYPASS GRAFT ANGIOGRAPHY  10/04/2021    Procedure: Bypass graft study;  Surgeon: Jose Ruiz MD;  Location: [facility] CATH LAB;  Service: Cardiology;;    CORONARY BYPASS GRAFT ANGIOGRAPHY  3/2/2023    Procedure: Bypass graft study;  Surgeon: Devon Garnica MD;  Location: [facility] CATH LAB;  Service: Cardiology;;    ECHOCARDIOGRAM,TRANSESOPHAGEAL N/A 4/14/2023    Procedure: Transesophageal echo (KIRSTEN) intra-procedure log documentation;  Surgeon: [facility] Diagnostic Provider;  Location: [facility] EP LAB;  Service: Cardiology;  Laterality: N/A;    ENDOSCOPIC NASAL CAUTERIZATION Bilateral 11/3/2023    Procedure: CAUTERIZATION, NOSE, ENDOSCOPIC;  Surgeon: Jono Russell MD;  Location: [facility] OR 2ND FLR;  Service: ENT;  Laterality: Bilateral;    ENDOSCOPIC NASAL CAUTERIZATION N/A 12/18/2023    Procedure: CAUTERIZATION, NOSE, ENDOSCOPIC;  Surgeon: Jono Russell MD;  Location: [facility] OR 2ND FLR;  Service: ENT;  Laterality: N/A;    EYE SURGERY      FESS, WITH IMAGING GUIDANCE Left 2/28/2024    Procedure: FESS, WITH IMAGING GUIDANCE;  Surgeon: Jono Russell MD;  Location: [facility] OR 2ND FLR;  Service: ENT;  Laterality: Left;  Scan loaded ENT Medtr[employer]. CL    FLUOROSCOPY Bilateral 10/29/2024    Procedure: Fluoroscopy;  Surgeon: Sameer Espitia MD;  Location: [facility] CATH LAB/EP;  Service: Cardiology;  Laterality: Bilateral;  fluoroscopy of the mechanical aortic valve    FUNCTIONAL ENDOSCOPIC SINUS SURGERY (FESS) Bilateral 6/14/2023    Procedure: FESS (FUNCTIONAL ENDOSCOPIC SINUS SURGERY);  Surgeon: Jono Russell MD;  Location: [facility] OR 2ND FLR;  Service: ENT;  Laterality: Bilateral;    HERNIA REPAIR      INTRAOCULAR PROSTHESES INSERTION Left 11/13/2022    Procedure: INSERTION, IOL PROSTHESIS;  Surgeon: Alia Mckeon MD;  Location: [facility] OR 1ST FLR;   Service: Ophthalmology;  Laterality: Left;    INTRAOCULAR PROSTHESES INSERTION Right 12/04/2022    Procedure: INSERTION, IOL PROSTHESIS;  Surgeon: Alia Mckeon MD;  Location: [facility] OR 1ST FLR;  Service: Ophthalmology;  Laterality: Right;    LIGATION, ARTERY, ETHMOIDAL Left 2/28/2024    Procedure: LIGATION, ARTERY, ETHMOIDAL;  Surgeon: Jono Russell MD;  Location: [facility] OR 2ND FLR;  Service: ENT;  Laterality: Left;    LIGATION, ARTERY, SPHENOPALATINE, ENDOSCOPIC Bilateral 6/14/2023    Procedure: LIGATION, ARTERY, SPHENOPALATINE, ENDOSCOPIC;  Surgeon: Jono Russell MD;  Location: [facility] OR 2ND FLR;  Service: ENT;  Laterality: Bilateral;    NASAL ENDOSCOPY N/A 8/8/2024    Procedure: ENDOSCOPY, NOSE;  Surgeon: Jono Russell MD;  [facility];  Service: ENT;  Laterality: N/A;    PHACOEMULSIFICATION OF CATARACT Left 11/13/2022    Procedure: PHACOEMULSIFICATION, CATARACT;  Surgeon: Alia Mckeon MD;  [facility];  Service: Ophthalmology;  Laterality: Left;    PHACOEMULSIFICATION OF CATARACT Right 12/04/2022    Procedure: PHACOEMULSIFICATION, CATARACT;  Surgeon: Alia Mckeon MD;  [facility];  Service: Ophthalmology;  Laterality: Right;    SPINE SURGERY      TRANSESOPHAGEAL ECHOCARDIOGRAPHY N/A 3/22/2023    Procedure: ECHOCARDIOGRAM, TRANSESOPHAGEAL;  Surgeon: [facility] Diagnostic Provider;  Location: [facility] EP LAB;  Service: Anesthesiology;  Laterality: N/A;    TRANSESOPHAGEAL ECHOCARDIOGRAPHY N/A 4/11/2023    Procedure: ECHOCARDIOGRAM, TRANSESOPHAGEAL;  Surgeon: [facility] Diagnostic Provider;  Location: [facility] EP LAB;  Service: Anesthesiology;  Laterality: N/A;    VASECTOMY       Family History       Problem Relation (Age of Onset)    Alcohol abuse Father    Diabetes Brother    Heart disease Mother, Father, Brother, Sister    Heart failure Mother, Father, Brother    No Known Problems Sister, Maternal Grandmother, Maternal Grandfather, Paternal Grandmother, Paternal  Grandfather, Maternal Aunt, Maternal Uncle, Paternal Aunt, Paternal Uncle          Tobacco Use    Smoking status: Former     Current packs/day: 0.00     Average packs/day: 1 pack/day for 20.0 years (20.0 ttl pk-yrs)     Types: Cigarettes     Start date: 1960     Quit date: 1980     Years since quittin.4     Passive exposure: Never    Smokeless tobacco: Never   Substance and Sexual Activity    Alcohol use: No    Drug use: No    Sexual activity: Not Currently     Partners: Female     Review of Systems  Objective:     Vital Signs (Most Recent):  Temp: 97.9 °F (36.6 °C) (25 1035)  Pulse: 64 (25 1300)  Resp: 17 (25 1300)  BP: (!) 162/70 (25 1300)  SpO2: 100 % (25 1300) Vital Signs (24h Range):  Temp:  [97.9 °F (36.6 °C)] 97.9 °F (36.6 °C)  Pulse:  [64-79] 64  Resp:  [17-20] 17  SpO2:  [100 %] 100 %  BP: (121-162)/(55-70) 162/70     Weight: 65.8 kg (145 lb)  Body mass index is 23.4 kg/m².         Physical Exam  NAD  Resting in bed comfortably   Head atraumatic   EOMI   Auricles WNL AU  Dried blood in nasal cavity, large anterior septal perforation, no active bleeding   Dried blood in oropharynx  Neck soft  Unlabored breathing, no stridor         Significant Labs:  CBC:   Recent Labs   Lab 25  1237   WBC 4.94   RBC 2.29*   HGB 6.7*   HCT 22.2*      MCV 97   MCH 29.3   MCHC 30.2*     CMP:   Recent Labs   Lab 25  1237      CALCIUM 9.6   ALBUMIN 3.0*   PROT 6.9      K 3.7   CO2 21*   *   BUN 47*   CREATININE 2.5*   ALKPHOS 71   ALT 14   AST 27   BILITOT 0.3     INR 25: 3.7    Significant Diagnostics:  I have reviewed all pertinent imaging results/findings within the past 24 hours.

## 2025-02-24 NOTE — ED NOTES
Received report and assumed care of patient at this time.  Patient is AAOx4 with a calm affect and aware of environment. Airway is open and patent, respirations are spontaneous, normal effort and rate noted. Pt denies chest pain at this time. Skin warm and dry. Movement to all extremities noted. Bed placed in low position, side rails up x 2, call light is within reach of patient. Explanation of care provided to patient, pt placed on cardiac monitoring. Patient offers no complaints at this time. Awaiting further MD orders and bed assignment, POC continues.

## 2025-02-24 NOTE — HPI
Dariel is a 84 y.o. with history of recurrent epistaxis, on Coumadin for heart valve, who is s/p bilateral SP ligation followed by bilateral embolization, cautery on the left septum for persistent bleeding on 11/03/2023 and 12/18/23. He recently underwent left AEA ligation on 2/28/24 and underwent nasal endoscopy and control of epistaxis on 6/18/24.  He then had repeat embolization on 08/06/24. Most recently s/p cautery on 12/30/24.    Per wife, he has had intermittent nose bleeds since last cautery and has progressed this past week. The nose bleeds usually stop with pressure and Afrin. However, this morning at 2am he had a large volume nose bleeds that did not stop until 11:30am prior to arrival to ED. ENT consulted for management of epistaxis. INR on 2/19 was 3.7. Hgb today was 6.7, getting 1u pRBC in ED.

## 2025-02-24 NOTE — ASSESSMENT & PLAN NOTE
83 yo male with recurrent epistaxis, currently hemostatic in the ER.    - Recommend admission to  for observation  - No packing in place currently  - Keep head of bed elevated  - Apply Afrin to affected nasal cavity if epistaxis recurs, please see detailed instructions below   - Please call ENT with questions  - Remainder of care per primary team        PATIENT INSTRUCTIONS:    Nasal precautions  - DO NOT blow your nose for 2 weeks. The only exception is that you may lightly blow your nose after using a sinus rinse.  - Sneeze/cough with mouth open  - Avoid irritating substances that might make you sneeze, such as dust, chalk, harsh chemicals, and allergic triggers. This might also include spicy foods.  - Do not smoke   - Avoid flying or swimming for 2 weeks.    - Avoid all heavy lifting, straining or bending for 2 weeks.   - Avoid semi-contact sports or vigorous exercising for 3-4 weeks.      Aftercare/ moisturizing recommendations to decrease frequency of nosebleeds:  - Daily nasal saline sprays/rinses  - Nightly application of vaseline/aquaphor to anterior nares  - Room humidification  - Avoidance of nasal cannula if possible (always with humidification and please use face tent, nasal cup, facemask if possible)  Management of medical conditions contributing to epistaxis (coagulopathy, hypertension, etc.)  - Humidification of CPAP appliance if applicable    If rebleeding occurs:   - Apply Afrin liberally to both nostrils  - Apply pressure over the soft part of the nose for 15 minutes (clip or digital pressure, important to have pressure over soft part of nose and consistent pressure (do not release pressure at any point))  - Instruct patient to lean forward, avoid swallowing blood. This can cause nausea and vomiting  - Can repeat these steps above up to 3 times  - Call/reconsult ENT or return to the nearest emergency department if this is unsuccessful

## 2025-02-24 NOTE — TELEPHONE ENCOUNTER
Patient scheduled today at 11:30 am with Dr Russell for nosebleed. Per Dr Russell if it is a active nosebleed he needs to go to ER. Advised patient's wife of this, she voiced understanding.

## 2025-02-24 NOTE — ED TRIAGE NOTES
Patient arrived to ER for chief complaint of epistaxis starting from 2am till 1130am this morning. The patient is also endorsing a cough for around one week.

## 2025-02-24 NOTE — CONSULTS
[facility] - Emergency Dept  Otorhinolaryngology-Head & Neck Surgery  Consult Note    Patient Name: Dariel Urbina  MRN: [id number]  Code Status: Prior  Admission Date: 2/24/2025  Hospital Length of Stay: 0 days  Attending Physician: Avelino Huang III, MD  Primary Care Provider: Devon Langston Jr., MD    Patient information was obtained from patient and ER records.     Inpatient consult to ENT  Consult performed by: Ying Nath MD  Consult ordered by: Vincent Valladares MD        Subjective:     Chief Complaint/Reason for Admission: epistaxis     History of Present Illness: Dariel is a 84 y.o. with history of recurrent epistaxis, on Coumadin for heart valve, who is s/p bilateral SP ligation followed by bilateral embolization, cautery on the left septum for persistent bleeding on 11/03/2023 and 12/18/23. He recently underwent left AEA ligation on 2/28/24 and underwent nasal endoscopy and control of epistaxis on 6/18/24.  He then had repeat embolization on 08/06/24. Most recently s/p cautery on 12/30/24.    Per wife, he has had intermittent nose bleeds since last cautery and has progressed this past week. The nose bleeds usually stop with pressure and Afrin. However, this morning at 2am he had a large volume nose bleed that did not stop until 11:30am prior to arrival to ED. Wife also notes cough and wheezing x 1 week. ENT consulted for management of epistaxis. INR on 2/19 was 3.7. Hgb today was 6.7, getting 1u pRBC in ED.     Medications:  Continuous Infusions:  Scheduled Meds:  PRN Meds:  Current Facility-Administered Medications:     0.9%  NaCl infusion (for blood administration), , Intravenous, Q24H PRN     No current facility-administered medications on file prior to encounter.     Current Outpatient Medications on File Prior to Encounter   Medication Sig    acetaminophen (TYLENOL) 500 MG tablet Take 500 mg by mouth daily as needed for Pain.    albuterol (VENTOLIN HFA) 90 mcg/actuation inhaler Inhale 2 puffs into  the lungs every 6 (six) hours as needed for Wheezing. Rescue    allopurinoL (ZYLOPRIM) 100 MG tablet Take 1 tablet (100 mg total) by mouth once daily.    bumetanide (BUMEX) 1 MG tablet Take 2 tablets (2 mg total) by mouth every other day.    ferrous gluconate (FERGON) 324 MG tablet TAKE 1 TABLET EVERY DAY WITH BREAKFAST    isosorbide mononitrate (IMDUR) 60 MG 24 hr tablet Take 1 tablet (60 mg total) by mouth every evening.    omega-3 fatty acids/fish oil (FISH OIL-OMEGA-3 FATTY ACIDS) 300-1,000 mg capsule Take 1 capsule by mouth once daily.    oxymetazoline (AFRIN) 0.05 % nasal spray 2 sprays by Nasal route as needed (nose bleeds).    rosuvastatin (CRESTOR) 40 MG Tab TAKE 1 TABLET EVERY EVENING.    sodium chloride (SALINE NASAL) 0.65 % nasal spray Use 2 sprays by Nasal route every 4 hours. Apply into nasal cavities daily with nightly application of vaseline/aquaphor to anterior nares. Keep head of bed elevated.    traZODone (DESYREL) 50 MG tablet Take 1 tablet (50 mg total) by mouth every evening. As needed    warfarin (COUMADIN) 5 MG tablet TAKE 1/2 TABLET (2.5MG) SATURDAY, THEN TAKE 1 TABLET (5MG) ALL OTHER DAYS OR AS INSTRUCTED BY COUMADIN CLINIC       Review of patient's allergies indicates:   Allergen Reactions    Lisinopril     Losartan      Intolerance- elevates potassium level       Past Medical History:   Diagnosis Date    Anemia of chronic renal failure, stage 3 (moderate) 05/27/2015    Anticoagulant long-term use     Atherosclerosis of coronary artery bypass graft of native heart without angina pectoris 09/11/2012    3-27-18 [facility] Two vessel coronary artery disease.   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    Bilateral carotid artery disease 02/09/2017    Bleeding from the nose     Bleeding nose 03/21/2018    Cancer     Cataract     CHF (congestive heart failure)     CKD (chronic kidney disease) stage 3, GFR 30-59 ml/min 05/27/2015    Claudication of left lower extremity 09/17/2014     Colon polyp     Encounter for blood transfusion     Gastroesophageal reflux disease without esophagitis 03/19/2018    Gastrointestinal hemorrhage associated with intestinal diverticulosis 04/01/2018    Glaucoma     H/O mechanical aortic valve replacement 09/17/2014    History of gout 09/26/2012    Hyperparathyroidism due to renal insufficiency 07/27/2015    Internal hemorrhoid 04/03/2018    Long term current use of anticoagulant therapy 09/26/2012    Mechanical heart valve present     Metabolic acidosis with normal anion gap and bicarbonate losses 03/20/2018    Mixed hyperlipidemia 09/26/2012    NSTEMI (non-ST elevated myocardial infarction) 03/21/2018    Obesity, diabetes, and hypertension syndrome 02/23/2016    Paroxysmal atrial fibrillation 02/06/2023    PVD (peripheral vascular disease) 09/11/2012    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     Past Surgical History:   Procedure Laterality Date    BACK SURGERY      CARDIAC CATHETERIZATION      CARDIAC VALVE REPLACEMENT      CARDIAC VALVE SURGERY      CARPAL TUNNEL RELEASE Right 05/19/2020    Procedure: RELEASE, CARPAL TUNNEL;  Surgeon: Rupesh Norris Jr., MD;  Location: [facility];  Service: Plastics;  Laterality: Right;    CATARACT EXTRACTION Left 11/13/2022        COLON SURGERY      COLONOSCOPY N/A 03/31/2017    Procedure: COLONOSCOPY;  Surgeon: Bruno Raymond MD;  Location: [facility] (4TH FLR);  Service: Endoscopy;  Laterality: N/A;  Patient's wife requesting date.    COLONOSCOPY N/A 04/03/2018    Procedure: COLONOSCOPY;  Surgeon: Bonifacio Pelletier MD;  Location: [facility] ENDO (2ND FLR);  Service: Endoscopy;  Laterality: N/A;     COLONOSCOPY N/A 08/13/2018    Procedure: COLONOSCOPY;  Surgeon: Kam Barba MD;  Location: [facility] ENDO (2ND FLR);  Service: Endoscopy;  Laterality: N/A;  2nd floor: PA pressure 49; hx of moderate-severe valve disease     per Coumadin clinic-Patient can hold 5 days with lovenox bridge       ok to schedule per Katarina    CONTROL OF EPISTAXIS, POSTERIOR, USING NASAL PACKING OR CAUTERIZATION Bilateral 6/18/2024    Procedure: CONTROL OF EPISTAXIS, POSTERIOR, USING NASAL PACKING OR CAUTERIZATION;  Surgeon: Jono Russell MD;  [facility];  Service: ENT;  Laterality: Bilateral;    CONTROL OF EPISTAXIS, POSTERIOR, USING NASAL PACKING OR CAUTERIZATION Bilateral 8/8/2024    Procedure: CONTROL OF EPISTAXIS, POSTERIOR, USING NASAL PACKING OR CAUTERIZATION;  Surgeon: Jono Russell MD;  [facility];  Service: ENT;  Laterality: Bilateral;  suction bovie, nasopore, endoscopes    CONTROL OF EPISTAXIS, POSTERIOR, USING NASAL PACKING OR CAUTERIZATION Bilateral 12/30/2024    Procedure: CONTROL OF EPISTAXIS, POSTERIOR, USING NASAL PACKING OR CAUTERIZATION;  Surgeon: Jono Russell MD;  [facility];  Service: ENT;  Laterality: Bilateral;    CORONARY ANGIOGRAPHY N/A 10/04/2021    Procedure: Left heart cath +/- peripheral angiogram;  Surgeon: Jose Ruiz MD;  Location: [facility] CATH LAB;  Service: Cardiology;  Laterality: N/A;    CORONARY ANGIOGRAPHY N/A 3/2/2023    Procedure: Angiogram, Coronary;  Surgeon: Devon Garnica MD;  Location: [facility] CATH LAB;  Service: Cardiology;  Laterality: N/A;    CORONARY ANGIOPLASTY      CORONARY ARTERY BYPASS GRAFT      CORONARY BYPASS GRAFT ANGIOGRAPHY  10/04/2021    Procedure: Bypass graft study;  Surgeon: Jose Ruiz MD;  Location: [facility] CATH LAB;  Service: Cardiology;;    CORONARY BYPASS GRAFT ANGIOGRAPHY  3/2/2023    Procedure: Bypass graft study;  Surgeon: Devon Garnica MD;  Location: [facility] CATH LAB;  Service: Cardiology;;     ECHOCARDIOGRAM,TRANSESOPHAGEAL N/A 4/14/2023    Procedure: Transesophageal echo (KIRSTEN) intra-procedure log documentation;  Surgeon: [facility] Diagnostic Provider;  Location: [facility] EP LAB;  Service: Cardiology;  Laterality: N/A;    ENDOSCOPIC NASAL CAUTERIZATION Bilateral 11/3/2023    Procedure: CAUTERIZATION, NOSE, ENDOSCOPIC;  Surgeon: Jono Russell MD;  Location: [facility] OR 2ND FLR;  Service: ENT;  Laterality: Bilateral;    ENDOSCOPIC NASAL CAUTERIZATION N/A 12/18/2023    Procedure: CAUTERIZATION, NOSE, ENDOSCOPIC;  Surgeon: Jono Russell MD;  Location: [facility] OR 2ND FLR;  Service: ENT;  Laterality: N/A;    EYE SURGERY      FESS, WITH IMAGING GUIDANCE Left 2/28/2024    Procedure: FESS, WITH IMAGING GUIDANCE;  Surgeon: Jono Russell MD;  Location: [facility] OR 2ND FLR;  Service: ENT;  Laterality: Left;  Scan loaded ENT [employer]. CL    FLUOROSCOPY Bilateral 10/29/2024    Procedure: Fluoroscopy;  Surgeon: Sameer Espitia MD;  Location: [facility] CATH LAB/EP;  Service: Cardiology;  Laterality: Bilateral;  fluoroscopy of the mechanical aortic valve    FUNCTIONAL ENDOSCOPIC SINUS SURGERY (FESS) Bilateral 6/14/2023    Procedure: FESS (FUNCTIONAL ENDOSCOPIC SINUS SURGERY);  Surgeon: Jono Russell MD;  Location: [facility] OR 2ND FLR;  Service: ENT;  Laterality: Bilateral;    HERNIA REPAIR      INTRAOCULAR PROSTHESES INSERTION Left 11/13/2022    Procedure: INSERTION, IOL PROSTHESIS;  Surgeon: Alia Mckeon MD;  Location: [facility] OR 1ST FLR;  Service: Ophthalmology;  Laterality: Left;    INTRAOCULAR PROSTHESES INSERTION Right 12/04/2022    Procedure: INSERTION, IOL PROSTHESIS;  Surgeon: Alia Mcekon MD;  Location: [facility] OR 1ST FLR;  Service: Ophthalmology;  Laterality: Right;    LIGATION, ARTERY, ETHMOIDAL Left 2/28/2024    Procedure: LIGATION, ARTERY, ETHMOIDAL;  Surgeon: Jono Russell MD;  Location: [facility] OR 2ND FLR;  Service: ENT;  Laterality: Left;    LIGATION, ARTERY, SPHENOPALATINE, ENDOSCOPIC Bilateral 6/14/2023     Procedure: LIGATION, ARTERY, SPHENOPALATINE, ENDOSCOPIC;  Surgeon: Jono Russell MD;  Location: [facility] OR 2ND FLR;  Service: ENT;  Laterality: Bilateral;    NASAL ENDOSCOPY N/A 2024    Procedure: ENDOSCOPY, NOSE;  Surgeon: Jono Russell MD;  Location: [facility] OR 2ND FLR;  Service: ENT;  Laterality: N/A;    PHACOEMULSIFICATION OF CATARACT Left 2022    Procedure: PHACOEMULSIFICATION, CATARACT;  Surgeon: Alia Mckeon MD;  Location: [facility] OR 1ST FLR;  Service: Ophthalmology;  Laterality: Left;    PHACOEMULSIFICATION OF CATARACT Right 2022    Procedure: PHACOEMULSIFICATION, CATARACT;  Surgeon: Alia Mckeon MD;  Location: [facility] OR 1ST FLR;  Service: Ophthalmology;  Laterality: Right;    SPINE SURGERY      TRANSESOPHAGEAL ECHOCARDIOGRAPHY N/A 3/22/2023    Procedure: ECHOCARDIOGRAM, TRANSESOPHAGEAL;  Surgeon: Dos Diagnostic Provider;  Location: [facility] EP LAB;  Service: Anesthesiology;  Laterality: N/A;    TRANSESOPHAGEAL ECHOCARDIOGRAPHY N/A 2023    Procedure: ECHOCARDIOGRAM, TRANSESOPHAGEAL;  Surgeon: Dosc Diagnostic Provider;  Location: [facility] EP LAB;  Service: Anesthesiology;  Laterality: N/A;    VASECTOMY       Family History       Problem Relation (Age of Onset)    Alcohol abuse Father    Diabetes Brother    Heart disease Mother, Father, Brother, Sister    Heart failure Mother, Father, Brother    No Known Problems Sister, Maternal Grandmother, Maternal Grandfather, Paternal Grandmother, Paternal Grandfather, Maternal Aunt, Maternal Uncle, Paternal Aunt, Paternal Uncle          Tobacco Use    Smoking status: Former     Current packs/day: 0.00     Average packs/day: 1 pack/day for 20.0 years (20.0 ttl pk-yrs)     Types: Cigarettes     Start date: 1960     Quit date: 1980     Years since quittin.4     Passive exposure: Never    Smokeless tobacco: Never   Substance and Sexual Activity    Alcohol use: No    Drug use: No    Sexual activity: Not Currently     Partners: Female      Review of Systems  Objective:     Vital Signs (Most Recent):  Temp: 97.9 °F (36.6 °C) (02/24/25 1035)  Pulse: 64 (02/24/25 1300)  Resp: 17 (02/24/25 1300)  BP: (!) 162/70 (02/24/25 1300)  SpO2: 100 % (02/24/25 1300) Vital Signs (24h Range):  Temp:  [97.9 °F (36.6 °C)] 97.9 °F (36.6 °C)  Pulse:  [64-79] 64  Resp:  [17-20] 17  SpO2:  [100 %] 100 %  BP: (121-162)/(55-70) 162/70     Weight: 65.8 kg (145 lb)  Body mass index is 23.4 kg/m².         Physical Exam  NAD  Resting in bed comfortably   Head atraumatic   EOMI   Auricles WNL AU  Dried blood in nasal cavity, large anterior septal perforation, no active bleeding   Dried blood in oropharynx  Neck soft  Unlabored breathing, no stridor         Significant Labs:  CBC:   Recent Labs   Lab 02/24/25  1237   WBC 4.94   RBC 2.29*   HGB 6.7*   HCT 22.2*      MCV 97   MCH 29.3   MCHC 30.2*     CMP:   Recent Labs   Lab 02/24/25  1237      CALCIUM 9.6   ALBUMIN 3.0*   PROT 6.9      K 3.7   CO2 21*   *   BUN 47*   CREATININE 2.5*   ALKPHOS 71   ALT 14   AST 27   BILITOT 0.3     INR 2/19/25: 3.7    Significant Diagnostics:  I have reviewed all pertinent imaging results/findings within the past 24 hours.    Assessment/Plan:     History of epistaxis  83 yo male with recurrent epistaxis, currently hemostatic in the ER.    - Recommend admission to  for observation, further workup per ED/primary  - No packing in place currently  - Plan for nasal cauterization with Dr. Russell on Wednesday 2/26/25  - Keep head of bed elevated  - Apply Afrin to affected nasal cavity if epistaxis recurs, please see detailed instructions below   - Please call ENT with questions or with further bleeding   - Remainder of care per primary team      If rebleeding occurs:   - Apply Afrin liberally to both nostrils  - Apply pressure over the soft part of the nose for 15 minutes (clip or digital pressure, important to have pressure over soft part of nose and consistent pressure (do  not release pressure at any point))  - Instruct patient to lean forward, avoid swallowing blood. This can cause nausea and vomiting  - Can repeat these steps above up to 3 times  - Page ENT if this is unsuccessful      VTE Risk Mitigation (From admission, onward)      None            Thank you for your consult. I will follow-up with patient. Please contact us if you have any additional questions.    Ying Nath MD  Otorhinolaryngology-Head & Neck Surgery  [facility] - Emergency Dept

## 2025-02-24 NOTE — ED PROVIDER NOTES
Encounter Date: 2/24/2025       History     Chief Complaint   Patient presents with    Epistaxis     Arrived with clamp to both nares from home, on coumadin, nail beds pale     Patient has been bleeding since 2:00 a.m. until around 11:00 a.m. when arriving to ED. patient is denies fatigue.  States multiple episodes of epistaxis which have required transfusion.  Currently on warfarin.  Denies any current bleeding.  Denies fatigue, shortness of breath, chest pain, back pain, abdominal pain, nausea, vomiting, dizziness. States recent coughing this week.       Review of patient's allergies indicates:   Allergen Reactions    Lisinopril     Losartan      Intolerance- elevates potassium level     Past Medical History:   Diagnosis Date    Anemia of chronic renal failure, stage 3 (moderate) 05/27/2015    Anticoagulant long-term use     Atherosclerosis of coronary artery bypass graft of native heart without angina pectoris 09/11/2012    3-27-18 [facility] Two vessel coronary artery disease.   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    Bilateral carotid artery disease 02/09/2017    Bleeding from the nose     Bleeding nose 03/21/2018    Cancer     Cataract     CHF (congestive heart failure)     CKD (chronic kidney disease) stage 3, GFR 30-59 ml/min 05/27/2015    Claudication of left lower extremity 09/17/2014    Colon polyp     Encounter for blood transfusion     Gastroesophageal reflux disease without esophagitis 03/19/2018    Gastrointestinal hemorrhage associated with intestinal diverticulosis 04/01/2018    Glaucoma     H/O mechanical aortic valve replacement 09/17/2014    History of gout 09/26/2012    Hyperparathyroidism due to renal insufficiency 07/27/2015    Internal hemorrhoid 04/03/2018    Long term current use of anticoagulant therapy 09/26/2012    Mechanical heart valve present     Metabolic acidosis with normal anion gap and bicarbonate losses 03/20/2018    Mixed hyperlipidemia 09/26/2012    NSTEMI  (non-ST elevated myocardial infarction) 03/21/2018    Obesity, diabetes, and hypertension syndrome 02/23/2016    Paroxysmal atrial fibrillation 02/06/2023    PVD (peripheral vascular disease) 09/11/2012    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     Past Surgical History:   Procedure Laterality Date    BACK SURGERY      CARDIAC CATHETERIZATION      CARDIAC VALVE REPLACEMENT      CARDIAC VALVE SURGERY      CARPAL TUNNEL RELEASE Right 05/19/2020    Procedure: RELEASE, CARPAL TUNNEL;  Surgeon: Rupesh Norris Jr., MD;  Location: [facility];  Service: Plastics;  Laterality: Right;    CATARACT EXTRACTION Left 11/13/2022        COLON SURGERY      COLONOSCOPY N/A 03/31/2017    Procedure: COLONOSCOPY;  Surgeon: Bruno Raymond MD;  Location: [facility] (4TH FLR);  Service: Endoscopy;  Laterality: N/A;  Patient's wife requesting date.    COLONOSCOPY N/A 04/03/2018    Procedure: COLONOSCOPY;  Surgeon: Bonifacio Pelletier MD;  Location: [facility] (2ND FLR);  Service: Endoscopy;  Laterality: N/A;    COLONOSCOPY N/A 08/13/2018    Procedure: COLONOSCOPY;  Surgeon: Kam Barba MD;  Location: [facility] (2ND FLR);  Service: Endoscopy;  Laterality: N/A;  2nd floor: PA pressure 49; hx of moderate-severe valve disease     per Coumadin clinic-Patient can hold 5 days with lovenox bridge       ok to schedule per Katarina    CONTROL OF EPISTAXIS, POSTERIOR, USING NASAL PACKING OR CAUTERIZATION Bilateral 6/18/2024    Procedure: CONTROL OF EPISTAXIS, POSTERIOR, USING NASAL PACKING OR CAUTERIZATION;  Surgeon: Jono Russell MD;  Location: [facility] OR 2ND FLR;  Service: ENT;  Laterality: Bilateral;    CONTROL OF  EPISTAXIS, POSTERIOR, USING NASAL PACKING OR CAUTERIZATION Bilateral 8/8/2024    Procedure: CONTROL OF EPISTAXIS, POSTERIOR, USING NASAL PACKING OR CAUTERIZATION;  Surgeon: Jono Russell MD;  [facility];  Service: ENT;  Laterality: Bilateral;  suction bovie, nasopore, endoscopes    CONTROL OF EPISTAXIS, POSTERIOR, USING NASAL PACKING OR CAUTERIZATION Bilateral 12/30/2024    Procedure: CONTROL OF EPISTAXIS, POSTERIOR, USING NASAL PACKING OR CAUTERIZATION;  Surgeon: Jono Russell MD;  [facility];  Service: ENT;  Laterality: Bilateral;    CORONARY ANGIOGRAPHY N/A 10/04/2021    Procedure: Left heart cath +/- peripheral angiogram;  Surgeon: Jose Ruiz MD;  Location: [facility] CATH LAB;  Service: Cardiology;  Laterality: N/A;    CORONARY ANGIOGRAPHY N/A 3/2/2023    Procedure: Angiogram, Coronary;  Surgeon: Devon Garnica MD;  Location: [facility] CATH LAB;  Service: Cardiology;  Laterality: N/A;    CORONARY ANGIOPLASTY      CORONARY ARTERY BYPASS GRAFT      CORONARY BYPASS GRAFT ANGIOGRAPHY  10/04/2021    Procedure: Bypass graft study;  Surgeon: Jose Ruiz MD;  Location: [facility] CATH LAB;  Service: Cardiology;;    CORONARY BYPASS GRAFT ANGIOGRAPHY  3/2/2023    Procedure: Bypass graft study;  Surgeon: Devon Garnica MD;  Location: [facility] CATH LAB;  Service: Cardiology;;    ECHOCARDIOGRAM,TRANSESOPHAGEAL N/A 4/14/2023    Procedure: Transesophageal echo (KIRSTEN) intra-procedure log documentation;  Surgeon: [facility] Diagnostic Provider;  Location: [facility] EP LAB;  Service: Cardiology;  Laterality: N/A;    ENDOSCOPIC NASAL CAUTERIZATION Bilateral 11/3/2023    Procedure: CAUTERIZATION, NOSE, ENDOSCOPIC;  Surgeon: Jono Russell MD;  [facility];  Service: ENT;  Laterality: Bilateral;    ENDOSCOPIC NASAL CAUTERIZATION N/A 12/18/2023    Procedure: CAUTERIZATION, NOSE, ENDOSCOPIC;  Surgeon: Jono Russell MD;  [facility];  Service: ENT;  Laterality: N/A;    EYE SURGERY       FESS, WITH IMAGING GUIDANCE Left 2/28/2024    Procedure: FESS, WITH IMAGING GUIDANCE;  Surgeon: Jono Russell MD;  Location: [facility] OR 2ND FLR;  Service: ENT;  Laterality: Left;  Scan loaded ENT [employer]. CL    FLUOROSCOPY Bilateral 10/29/2024    Procedure: Fluoroscopy;  Surgeon: Sameer Espitia MD;  Location: [facility] CATH LAB/EP;  Service: Cardiology;  Laterality: Bilateral;  fluoroscopy of the mechanical aortic valve    FUNCTIONAL ENDOSCOPIC SINUS SURGERY (FESS) Bilateral 6/14/2023    Procedure: FESS (FUNCTIONAL ENDOSCOPIC SINUS SURGERY);  Surgeon: Jono Russell MD;  Location: [facility] OR 2ND FLR;  Service: ENT;  Laterality: Bilateral;    HERNIA REPAIR      INTRAOCULAR PROSTHESES INSERTION Left 11/13/2022    Procedure: INSERTION, IOL PROSTHESIS;  Surgeon: Alia Mckeon MD;  Location: [facility] OR 1ST FLR;  Service: Ophthalmology;  Laterality: Left;    INTRAOCULAR PROSTHESES INSERTION Right 12/04/2022    Procedure: INSERTION, IOL PROSTHESIS;  Surgeon: Alia Mckeon MD;  Location: [facility] OR 1ST FLR;  Service: Ophthalmology;  Laterality: Right;    LIGATION, ARTERY, ETHMOIDAL Left 2/28/2024    Procedure: LIGATION, ARTERY, ETHMOIDAL;  Surgeon: Jono Russell MD;  Location: [facility] OR 2ND FLR;  Service: ENT;  Laterality: Left;    LIGATION, ARTERY, SPHENOPALATINE, ENDOSCOPIC Bilateral 6/14/2023    Procedure: LIGATION, ARTERY, SPHENOPALATINE, ENDOSCOPIC;  Surgeon: Jono Russell MD;  [facility] OR 2ND FLR;  Service: ENT;  Laterality: Bilateral;    NASAL ENDOSCOPY N/A 8/8/2024    Procedure: ENDOSCOPY, NOSE;  Surgeon: Jono Russell MD;  Location: [facility] OR 2ND FLR;  Service: ENT;  Laterality: N/A;    PHACOEMULSIFICATION OF CATARACT Left 11/13/2022    Procedure: PHACOEMULSIFICATION, CATARACT;  Surgeon: Alia Mckeon MD;  Location: [facility] OR 1ST FLR;  Service: Ophthalmology;  Laterality: Left;    PHACOEMULSIFICATION OF CATARACT Right 12/04/2022    Procedure: PHACOEMULSIFICATION, CATARACT;  Surgeon: Alia BANG  MD Mike;  [facility];  Service: Ophthalmology;  Laterality: Right;    SPINE SURGERY      TRANSESOPHAGEAL ECHOCARDIOGRAPHY N/A 3/22/2023    Procedure: ECHOCARDIOGRAM, TRANSESOPHAGEAL;  Surgeon: [facility] Diagnostic Provider;  Location: [facility] EP LAB;  Service: Anesthesiology;  Laterality: N/A;    TRANSESOPHAGEAL ECHOCARDIOGRAPHY N/A 4/11/2023    Procedure: ECHOCARDIOGRAM, TRANSESOPHAGEAL;  Surgeon: [facility] Diagnostic Provider;  Location: [facility] EP LAB;  Service: Anesthesiology;  Laterality: N/A;    VASECTOMY       Family History   Problem Relation Name Age of Onset    Heart failure Mother      Heart disease Mother      Heart failure Father      Heart disease Father      Alcohol abuse Father      Heart failure Brother      Heart disease Brother      Diabetes Brother      No Known Problems Sister      No Known Problems Maternal Grandmother      No Known Problems Maternal Grandfather      No Known Problems Paternal Grandmother      No Known Problems Paternal Grandfather      Heart disease Sister      No Known Problems Maternal Aunt      No Known Problems Maternal Uncle      No Known Problems Paternal Aunt      No Known Problems Paternal Uncle      Amblyopia Neg Hx      Blindness Neg Hx      Cancer Neg Hx      Cataracts Neg Hx      Glaucoma Neg Hx      Hypertension Neg Hx      Macular degeneration Neg Hx      Retinal detachment Neg Hx      Strabismus Neg Hx      Stroke Neg Hx      Thyroid disease Neg Hx      Anemia Neg Hx      Arrhythmia Neg Hx      Asthma Neg Hx      Clotting disorder Neg Hx      Fainting Neg Hx      Heart attack Neg Hx      Hyperlipidemia Neg Hx      Atrial Septal Defect Neg Hx      Melanoma Neg Hx       Social History[1]  Review of Systems    Physical Exam     Initial Vitals   BP Pulse Resp Temp SpO2   02/24/25 1035 02/24/25 1035 02/24/25 1035 02/24/25 1035 02/24/25 1105   (!) 131/55 79 20 97.9 °F (36.6 °C) 100 %      MAP       --                Physical Exam    Constitutional: He appears  well-developed and well-nourished. He is not diaphoretic. No distress.   HENT:   Head: Normocephalic and atraumatic.   No active epistaxis.   Eyes: Conjunctivae and EOM are normal. Pupils are equal, round, and reactive to light. Right eye exhibits no discharge. Left eye exhibits no discharge. No scleral icterus.   Cardiovascular:  Normal rate, regular rhythm, normal heart sounds and intact distal pulses.           No murmur heard.  Pulmonary/Chest: Breath sounds normal. No respiratory distress. He has no wheezes. He has no rhonchi. He has no rales.   Able to speak full sentences.   Abdominal: Abdomen is soft. He exhibits no distension. There is no abdominal tenderness. There is no rebound and no guarding.     Neurological: He is alert and oriented to person, place, and time.   Skin: Capillary refill takes 2 to 3 seconds. No rash and no abscess noted. No erythema.   Pale nail bed   Psychiatric: He has a normal mood and affect.         ED Course   Critical Care    Date/Time: 2/26/2025 8:47 AM    Performed by: Avelino Huang III, MD  Authorized by: Gina Spencer MD  Total critical care time (exclusive of procedural time) : 30 minutes  Critical care time was exclusive of separately billable procedures and treating other patients and teaching time.  Comments: Patient required numerous evaluations of his cardiopulmonary status his course emergency department for his life-threatening severe epistaxis with anemia requiring emergent transfusion        Labs Reviewed   CBC W/ AUTO DIFFERENTIAL - Abnormal       Result Value    WBC 4.94      RBC 2.29 (*)     Hemoglobin 6.7 (*)     Hematocrit 22.2 (*)     MCV 97      MCH 29.3      MCHC 30.2 (*)     RDW 14.8 (*)     Platelets 153      MPV 10.4      Immature Granulocytes 0.4      Gran # (ANC) 3.7      Immature Grans (Abs) 0.02      Lymph # 0.6 (*)     Mono # 0.4      Eos # 0.2      Baso # 0.02      nRBC 0      Gran % 75.0 (*)     Lymph % 12.3 (*)     Mono % 8.5      Eosinophil %  3.4      Basophil % 0.4      Differential Method Automated     COMPREHENSIVE METABOLIC PANEL - Abnormal    Sodium 142      Potassium 3.7      Chloride 111 (*)     CO2 21 (*)     Glucose 100      BUN 47 (*)     Creatinine 2.5 (*)     Calcium 9.6      Total Protein 6.9      Albumin 3.0 (*)     Total Bilirubin 0.3      Alkaline Phosphatase 71      AST 27      ALT 14      eGFR 24.9 (*)     Anion Gap 10     PROTIME-INR - Abnormal    Prothrombin Time 38.1 (*)     INR 3.8 (*)    APTT - Abnormal    aPTT 42.1 (*)    CBC W/ AUTO DIFFERENTIAL - Abnormal    WBC 6.05      RBC 2.56 (*)     Hemoglobin 7.2 (*)     Hematocrit 24.2 (*)     MCV 95      MCH 28.1      MCHC 29.8 (*)     RDW 14.7 (*)     Platelets 140 (*)     MPV 10.4      Immature Granulocytes 0.3      Gran # (ANC) 4.6      Immature Grans (Abs) 0.02      Lymph # 0.8 (*)     Mono # 0.5      Eos # 0.2      Baso # 0.03      nRBC 0      Gran % 75.4 (*)     Lymph % 13.2 (*)     Mono % 7.6      Eosinophil % 3.0      Basophil % 0.5      Differential Method Automated     HEPATITIS C ANTIBODY    Hepatitis C Ab Non-reactive      Narrative:     Release to patient->Immediate   HIV 1 / 2 ANTIBODY    HIV 1/2 Ag/Ab Non-reactive      Narrative:     Release to patient->Immediate   SARS-COV2 (COVID) WITH FLU/RSV BY PCR    SARS-CoV2 (COVID-19) Qualitative PCR Not Detected      Influenza A, Molecular Not Detected      Influenza B, Molecular Not Detected      RSV Ag by Molecular Method Not Detected     POCT INFLUENZA A/B MOLECULAR   TYPE & SCREEN    Group & Rh O NEG      Indirect Justyna NEG      Specimen Outdate 02/27/2025 23:59     POCT GLUCOSE MONITORING CONTINUOUS   PREPARE RBC SOFT    UNIT NUMBER [id number]      Product Code Q9996Y44      DISPENSE STATUS TRANSFUSED      CODING SYSTEM [id number]      Unit Blood Type Code 9500      Unit Blood Type O NEG      Unit Expiration [id number]      CROSSMATCH INTERPRETATION Compatible            Imaging Results              X-Ray Chest AP  Portable (Final result)  Result time 02/24/25 15:10:10      Final result by Kam Dash MD (02/24/25 15:10:10)                   Impression:      See above      Electronically signed by: Kam Dash MD  Date:    02/24/2025  Time:    15:10               Narrative:    EXAMINATION:  XR CHEST AP PORTABLE    CLINICAL HISTORY:  Epistaxis    TECHNIQUE:  Single frontal view of the chest was performed.    COMPARISON:  N 09/26/2024 one    FINDINGS:  Postoperative changes as before.  Mild cardiomegaly.  No significant airspace consolidation or pleural effusion identified                                       Medications   0.9%  NaCl infusion (for blood administration) (has no administration in time range)   sodium chloride 0.9% flush 10 mL (has no administration in time range)   naloxone 0.4 mg/mL injection 0.02 mg (has no administration in time range)   isosorbide mononitrate 24 hr tablet 60 mg (60 mg Oral Given 2/25/25 2300)   oxymetazoline 0.05 % nasal spray 2 spray (has no administration in time range)   atorvastatin tablet 80 mg (80 mg Oral Given 2/26/25 0842)   traZODone tablet 50 mg (50 mg Oral Given 2/25/25 2300)   albuterol-ipratropium 2.5 mg-0.5 mg/3 mL nebulizer solution 3 mL (3 mLs Nebulization Given 2/26/25 0805)   glucose chewable tablet 16 g (has no administration in time range)   glucose chewable tablet 24 g (has no administration in time range)   dextrose 50% injection 12.5 g (has no administration in time range)   dextrose 50% injection 25 g (has no administration in time range)   glucagon (human recombinant) injection 1 mg (has no administration in time range)   insulin aspart U-100 pen 0-5 Units (has no administration in time range)   0.9%  NaCl infusion (for blood administration) (has no administration in time range)   potassium bicarbonate disintegrating tablet 40 mEq (40 mEq Oral Given 2/25/25 1241)     Medical Decision Making  Patient with a past medical history of epistaxis requiring transfusion  has been bleeding since 2:00 a.m. until around 11:00 a.m. after arriving to ED.     Differential diagnosis includes, but is not limited to:     Epistaxis    Anemia    URI    Management:    ENT consulted for epistaxis. Type and screen ordered.  CBC shows hemoglobin is 6.7.  Preparation of RBCs ordered. Pending transfusion. Portable CXR, covid and flu ordered to evaluate for cough. Patient admitted to hospital medicine for observation at recommendation of ENT.    Amount and/or Complexity of Data Reviewed  Independent Historian: spouse     Details: Collateral history obtained from spouse.  Labs: ordered. Decision-making details documented in ED Course.     Details: Cbc cmp type&screen  Radiology: ordered and independent interpretation performed.     Details: Portable chest xray: no acute process  ECG/medicine tests: ordered.  Discussion of management or test interpretation with external provider(s): IM will admit    Risk  Prescription drug management.  Decision regarding hospitalization.              Attending Attestation:   Physician Attestation Statement for Resident:  As the supervising MD   Physician Attestation Statement: I have personally seen and examined this patient.   I agree with the above history.  -: Epistaxis and anemia   As the supervising MD I agree with the above PE.     As the supervising MD I agree with the above treatment, course, plan, and disposition.                    ED Course as of 02/26/25 0846   Mon Feb 24, 2025   1323 Comprehensive metabolic panel(!) [AG]      ED Course User Index  [AG] Vincent Valladares MD                           Clinical Impression:  Final diagnoses:  [R04.0] Epistaxis (Primary)          ED Disposition Condition    Observation Stable                  Vincent Valladares MD  Resident  02/24/25 1428         [1]   Social History  Tobacco Use    Smoking status: Former     Current packs/day: 0.00     Average packs/day: 1 pack/day for 20.0 years (20.0 ttl pk-yrs)     Types:  Cigarettes     Start date: 1960     Quit date: 1980     Years since quittin.4     Passive exposure: Never    Smokeless tobacco: Never   Substance Use Topics    Alcohol use: No    Drug use: No        Avelino Huang III, MD  25 [id number]

## 2025-02-24 NOTE — ASSESSMENT & PLAN NOTE
84 y.o. with history of recurrent epistaxis, on Coumadin for heart valve, who is s/p bilateral SP ligation and bilateral embolization and multiple cauterizations by ENT. He presents due to epistaxis 2/24/25. He is currently hemostatic in the ER. Hgb was 6.7, s/p 1u pRBC in the ER. Admitted to  for observation    - No packing in place currently - currently hemostatic  - Keep head of bed elevated  - Apply Afrin to affected nasal cavity if epistaxis recurs, please see detailed instructions below   - Plan to OR on Wednesday 2/26 for nasal endoscopy and control of epistaxis. Will consent later this AM.   - Please call ENT with questions  - Remainder of care per primary team    Aftercare/ moisturizing recommendations to decrease frequency of nosebleeds:  - Daily nasal saline sprays/rinses  - Nightly application of vaseline/aquaphor to anterior nares  - Room humidification  - Avoidance of nasal cannula if possible (always with humidification and please use face tent, nasal cup, facemask if possible)  Management of medical conditions contributing to epistaxis (coagulopathy, hypertension, etc.)  - Humidification of CPAP appliance if applicable    If rebleeding occurs:   - Apply Afrin liberally to both nostrils  - Apply pressure over the soft part of the nose for 15 minutes (clip or digital pressure, important to have pressure over soft part of nose and consistent pressure (do not release pressure at any point))  - Instruct patient to lean forward, avoid swallowing blood. This can cause nausea and vomiting  - Can repeat these steps above up to 3 times  - Call/reconsult ENT or return to the nearest emergency department if this is unsuccessful

## 2025-02-25 LAB
ALBUMIN SERPL BCP-MCNC: 2.8 G/DL (ref 3.5–5.2)
ALP SERPL-CCNC: 69 U/L (ref 40–150)
ALT SERPL W/O P-5'-P-CCNC: 12 U/L (ref 10–44)
ANION GAP SERPL CALC-SCNC: 11 MMOL/L (ref 8–16)
AST SERPL-CCNC: 25 U/L (ref 10–40)
BASOPHILS # BLD AUTO: 0.03 K/UL (ref 0–0.2)
BASOPHILS NFR BLD: 0.6 % (ref 0–1.9)
BILIRUB SERPL-MCNC: 0.9 MG/DL (ref 0.1–1)
BUN SERPL-MCNC: 43 MG/DL (ref 8–23)
CALCIUM SERPL-MCNC: 9.1 MG/DL (ref 8.7–10.5)
CHLORIDE SERPL-SCNC: 110 MMOL/L (ref 95–110)
CO2 SERPL-SCNC: 18 MMOL/L (ref 23–29)
CREAT SERPL-MCNC: 2.2 MG/DL (ref 0.5–1.4)
DIFFERENTIAL METHOD BLD: ABNORMAL
EOSINOPHIL # BLD AUTO: 0.2 K/UL (ref 0–0.5)
EOSINOPHIL NFR BLD: 3.4 % (ref 0–8)
ERYTHROCYTE [DISTWIDTH] IN BLOOD BY AUTOMATED COUNT: 15.1 % (ref 11.5–14.5)
EST. GFR  (NO RACE VARIABLE): 29 ML/MIN/1.73 M^2
GLUCOSE SERPL-MCNC: 67 MG/DL (ref 70–110)
HCT VFR BLD AUTO: 22.9 % (ref 40–54)
HGB BLD-MCNC: 7 G/DL (ref 14–18)
IMM GRANULOCYTES # BLD AUTO: 0.01 K/UL (ref 0–0.04)
IMM GRANULOCYTES NFR BLD AUTO: 0.2 % (ref 0–0.5)
INR PPP: 3.6 (ref 0.8–1.2)
LYMPHOCYTES # BLD AUTO: 0.8 K/UL (ref 1–4.8)
LYMPHOCYTES NFR BLD: 15.9 % (ref 18–48)
MAGNESIUM SERPL-MCNC: 1.8 MG/DL (ref 1.6–2.6)
MCH RBC QN AUTO: 28.5 PG (ref 27–31)
MCHC RBC AUTO-ENTMCNC: 30.6 G/DL (ref 32–36)
MCV RBC AUTO: 93 FL (ref 82–98)
MONOCYTES # BLD AUTO: 0.5 K/UL (ref 0.3–1)
MONOCYTES NFR BLD: 9.7 % (ref 4–15)
NEUTROPHILS # BLD AUTO: 3.3 K/UL (ref 1.8–7.7)
NEUTROPHILS NFR BLD: 70.2 % (ref 38–73)
NRBC BLD-RTO: 0 /100 WBC
PHOSPHATE SERPL-MCNC: 2.8 MG/DL (ref 2.7–4.5)
PLATELET # BLD AUTO: 133 K/UL (ref 150–450)
PMV BLD AUTO: 10.5 FL (ref 9.2–12.9)
POCT GLUCOSE: 124 MG/DL (ref 70–110)
POCT GLUCOSE: 126 MG/DL (ref 70–110)
POCT GLUCOSE: 71 MG/DL (ref 70–110)
POCT GLUCOSE: 82 MG/DL (ref 70–110)
POTASSIUM SERPL-SCNC: 3.6 MMOL/L (ref 3.5–5.1)
PROT SERPL-MCNC: 6.3 G/DL (ref 6–8.4)
PROTHROMBIN TIME: 35.8 SEC (ref 9–12.5)
RBC # BLD AUTO: 2.46 M/UL (ref 4.6–6.2)
SODIUM SERPL-SCNC: 139 MMOL/L (ref 136–145)
WBC # BLD AUTO: 4.72 K/UL (ref 3.9–12.7)

## 2025-02-25 PROCEDURE — 97535 SELF CARE MNGMENT TRAINING: CPT | Mod: HCNC

## 2025-02-25 PROCEDURE — 21400001 HC TELEMETRY ROOM: Mod: HCNC

## 2025-02-25 PROCEDURE — 25000242 PHARM REV CODE 250 ALT 637 W/ HCPCS: Mod: HCNC

## 2025-02-25 PROCEDURE — 85610 PROTHROMBIN TIME: CPT | Mod: HCNC

## 2025-02-25 PROCEDURE — 84100 ASSAY OF PHOSPHORUS: CPT | Mod: HCNC

## 2025-02-25 PROCEDURE — 99900035 HC TECH TIME PER 15 MIN (STAT): Mod: HCNC

## 2025-02-25 PROCEDURE — 83735 ASSAY OF MAGNESIUM: CPT | Mod: HCNC

## 2025-02-25 PROCEDURE — 36415 COLL VENOUS BLD VENIPUNCTURE: CPT | Mod: HCNC

## 2025-02-25 PROCEDURE — 94761 N-INVAS EAR/PLS OXIMETRY MLT: CPT | Mod: HCNC

## 2025-02-25 PROCEDURE — 80053 COMPREHEN METABOLIC PANEL: CPT | Mod: HCNC

## 2025-02-25 PROCEDURE — 25000003 PHARM REV CODE 250: Mod: HCNC

## 2025-02-25 PROCEDURE — 94640 AIRWAY INHALATION TREATMENT: CPT | Mod: HCNC,XB

## 2025-02-25 PROCEDURE — 85025 COMPLETE CBC W/AUTO DIFF WBC: CPT | Mod: HCNC

## 2025-02-25 PROCEDURE — 92610 EVALUATE SWALLOWING FUNCTION: CPT | Mod: HCNC

## 2025-02-25 RX ADMIN — TRAZODONE HYDROCHLORIDE 50 MG: 50 TABLET ORAL at 11:02

## 2025-02-25 RX ADMIN — POTASSIUM BICARBONATE 40 MEQ: 391 TABLET, EFFERVESCENT ORAL at 10:02

## 2025-02-25 RX ADMIN — IPRATROPIUM BROMIDE AND ALBUTEROL SULFATE 3 ML: 2.5; .5 SOLUTION RESPIRATORY (INHALATION) at 07:02

## 2025-02-25 RX ADMIN — IPRATROPIUM BROMIDE AND ALBUTEROL SULFATE 3 ML: 2.5; .5 SOLUTION RESPIRATORY (INHALATION) at 04:02

## 2025-02-25 RX ADMIN — ISOSORBIDE MONONITRATE 60 MG: 60 TABLET, EXTENDED RELEASE ORAL at 11:02

## 2025-02-25 RX ADMIN — ATORVASTATIN CALCIUM 80 MG: 40 TABLET, FILM COATED ORAL at 10:02

## 2025-02-25 RX ADMIN — POTASSIUM BICARBONATE 40 MEQ: 391 TABLET, EFFERVESCENT ORAL at 12:02

## 2025-02-25 RX ADMIN — IPRATROPIUM BROMIDE AND ALBUTEROL SULFATE 3 ML: 2.5; .5 SOLUTION RESPIRATORY (INHALATION) at 08:02

## 2025-02-25 RX ADMIN — IPRATROPIUM BROMIDE AND ALBUTEROL SULFATE 3 ML: 2.5; .5 SOLUTION RESPIRATORY (INHALATION) at 12:02

## 2025-02-25 NOTE — ED NOTES
Telemetry Verification   Patient placed on Telemetry Box  Verified with War Room  Box # [id number]   Monitor Tech    Rate 75   Rhythm afib

## 2025-02-25 NOTE — HPI
84 y.o. male with  paroxysmal afib, mechanical aortic valve replacement on Coumadin, CHF, CKD stage 3, T2DM, HTN, recurrent epistaxis s/p multiple cauterizations who is admitted to hospital medicine for acute blood loss anemia 2/2 to profound epistaxis.     Pt noted to have intermittent nosebleeds since prior cauterization which resolve with pressure and afrin. Usually has 1-3 epistaxis episodes a day that last for 1-2 hours. But this past week they have worsened in severity and have been more difficult to control with pressure and Afrin. Today morning 2 AM, he had a large episode of epistaxis that wouldn't resolve with Afrin and pressure till about 11:30 AM prior to arrival at ED. Denies any trauma to the area or any specific triggering events. He reports this past week he has had cough, sore throat, and congestion. He denies any fatigue, dizziness, chest pain, or abdominal pain.     ED Course: XR chest unremarkable, Hgb 6.7, Hct 22.2 - 1 unit pRBC given

## 2025-02-25 NOTE — H&P
[facility] - Emergency Dept  [facility] Medicine  History & Physical    Patient Name: Dariel Urbina  MRN: [id number]  Patient Class: OP- Observation  Admission Date: 2/24/2025  Attending Physician: Gina Spencer MD   Primary Care Provider: Devon Langston Jr., MD         Patient information was obtained from patient and ER records.     Subjective:     Principal Problem:Acute blood loss anemia    Chief Complaint:   Chief Complaint   Patient presents with    Epistaxis     Arrived with clamp to both nares from home, on coumadin, nail beds pale        HPI: Dariel Urbina is a 84 y.o. male with a relevant medical history of paroxysmal afib on warfarin, mechanical aortic valve replacement, CHF, CKD stage 3, T2DM, HTN who presents with epistaxis.     He notes bleeding started at 2am and continued until 11am right before arriving to the ED. He denies any trauma to the area or any specific triggering events. He has had recurrent nosebleeds for years, usually 1-3 a day that last for 1-2 hours. But this past week they have worsened in severity and have been more difficult to control with pressure and Afrin. He reports this past week he has had cough, sore throat, and congestion. He states a history of recurrent nosebleeds requiring transfusions. He denies any fatigue, dizziness, chest pain, or abdominal pain.     ED Course: XR chest unremarkable, Hgb 6.7, Hct 22.2 - 1 unit pRBC given    Past Medical History:   Diagnosis Date    Anemia of chronic renal failure, stage 3 (moderate) 05/27/2015    Anticoagulant long-term use     Atherosclerosis of coronary artery bypass graft of native heart without angina pectoris 09/11/2012    3-27-18 [facility] Two vessel coronary artery disease.   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    Bilateral carotid artery disease 02/09/2017    Bleeding from the nose     Bleeding nose 03/21/2018    Cancer     Cataract     CHF (congestive heart failure)     CKD (chronic kidney  disease) stage 3, GFR 30-59 ml/min 05/27/2015    Claudication of left lower extremity 09/17/2014    Colon polyp     Encounter for blood transfusion     Gastroesophageal reflux disease without esophagitis 03/19/2018    Gastrointestinal hemorrhage associated with intestinal diverticulosis 04/01/2018    Glaucoma     H/O mechanical aortic valve replacement 09/17/2014    History of gout 09/26/2012    Hyperparathyroidism due to renal insufficiency 07/27/2015    Internal hemorrhoid 04/03/2018    Long term current use of anticoagulant therapy 09/26/2012    Mechanical heart valve present     Metabolic acidosis with normal anion gap and bicarbonate losses 03/20/2018    Mixed hyperlipidemia 09/26/2012    NSTEMI (non-ST elevated myocardial infarction) 03/21/2018    Obesity, diabetes, and hypertension syndrome 02/23/2016    Paroxysmal atrial fibrillation 02/06/2023    PVD (peripheral vascular disease) 09/11/2012    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       Past Surgical History:   Procedure Laterality Date    BACK SURGERY      CARDIAC CATHETERIZATION      CARDIAC VALVE REPLACEMENT      CARDIAC VALVE SURGERY      CARPAL TUNNEL RELEASE Right 05/19/2020    Procedure: RELEASE, CARPAL TUNNEL;  Surgeon: Rupesh Norris Jr., MD;  Location: [facility];  Service: Plastics;  Laterality: Right;    CATARACT EXTRACTION Left 11/13/2022        COLON SURGERY      COLONOSCOPY N/A 03/31/2017    Procedure: COLONOSCOPY;  Surgeon: Bruno Raymond MD;  Location: [address]);  Service: Endoscopy;  Laterality: N/A;  Patient's wife requesting date.    COLONOSCOPY N/A 04/03/2018    Procedure: COLONOSCOPY;   Surgeon: Bonifacio Pelletier MD;  Location: [facility] ENDO (2ND FLR);  Service: Endoscopy;  Laterality: N/A;    COLONOSCOPY N/A 08/13/2018    Procedure: COLONOSCOPY;  Surgeon: Kam Barba MD;  Location: [facility] ENDO (2ND FLR);  Service: Endoscopy;  Laterality: N/A;  2nd floor: PA pressure 49; hx of moderate-severe valve disease     per Coumadin clinic-Patient can hold 5 days with lovenox bridge       ok to schedule per Katarina    CONTROL OF EPISTAXIS, POSTERIOR, USING NASAL PACKING OR CAUTERIZATION Bilateral 6/18/2024    Procedure: CONTROL OF EPISTAXIS, POSTERIOR, USING NASAL PACKING OR CAUTERIZATION;  Surgeon: Jono Russell MD;  Location: [facility] OR [address] [place] [address];  Service: ENT;  Laterality: Bilateral;    CONTROL OF EPISTAXIS, POSTERIOR, USING NASAL PACKING OR CAUTERIZATION Bilateral 8/8/2024    Procedure: CONTROL OF EPISTAXIS, POSTERIOR, USING NASAL PACKING OR CAUTERIZATION;  Surgeon: Jono Russell MD;  [facility];  Service: ENT;  Laterality: Bilateral;  suction bovie, nasopore, endoscopes    CONTROL OF EPISTAXIS, POSTERIOR, USING NASAL PACKING OR CAUTERIZATION Bilateral 12/30/2024    Procedure: CONTROL OF EPISTAXIS, POSTERIOR, USING NASAL PACKING OR CAUTERIZATION;  Surgeon: Jono Russell MD;  Location: [facility] OR [address] [place] [address];  Service: ENT;  Laterality: Bilateral;    CORONARY ANGIOGRAPHY N/A 10/04/2021    Procedure: Left heart cath +/- peripheral angiogram;  Surgeon: Jose Ruiz MD;  Location: [facility] CATH LAB;  Service: Cardiology;  Laterality: N/A;    CORONARY ANGIOGRAPHY N/A 3/2/2023    Procedure: Angiogram, Coronary;  Surgeon: Devon Garnica MD;  Location: [facility] CATH LAB;  Service: Cardiology;  Laterality: N/A;    CORONARY ANGIOPLASTY      CORONARY ARTERY BYPASS GRAFT      CORONARY BYPASS GRAFT ANGIOGRAPHY  10/04/2021    Procedure: Bypass graft study;  Surgeon: Jose Ruiz MD;  Location: [facility] CATH LAB;  Service: Cardiology;;    CORONARY BYPASS GRAFT ANGIOGRAPHY  3/2/2023    Procedure: Bypass graft  study;  Surgeon: Devon Garnica MD;  Location: [facility] CATH LAB;  Service: Cardiology;;    ECHOCARDIOGRAM,TRANSESOPHAGEAL N/A 4/14/2023    Procedure: Transesophageal echo (KIRSTEN) intra-procedure log documentation;  Surgeon: [facility] Diagnostic Provider;  Location: [facility] EP LAB;  Service: Cardiology;  Laterality: N/A;    ENDOSCOPIC NASAL CAUTERIZATION Bilateral 11/3/2023    Procedure: CAUTERIZATION, NOSE, ENDOSCOPIC;  Surgeon: Jono Russell MD;  Location: [facility] OR 2ND FLR;  Service: ENT;  Laterality: Bilateral;    ENDOSCOPIC NASAL CAUTERIZATION N/A 12/18/2023    Procedure: CAUTERIZATION, NOSE, ENDOSCOPIC;  Surgeon: Jono Russell MD;  Location: [facility] OR 2ND FLR;  Service: ENT;  Laterality: N/A;    EYE SURGERY      FESS, WITH IMAGING GUIDANCE Left 2/28/2024    Procedure: FESS, WITH IMAGING GUIDANCE;  Surgeon: Jono Russell MD;  [facility];  Service: ENT;  Laterality: Left;  Scan loaded ENT Clinical Pathology Laboratoriestronic. CL    FLUOROSCOPY Bilateral 10/29/2024    Procedure: Fluoroscopy;  Surgeon: Sameer Espitia MD;  Location: [facility] CATH LAB/EP;  Service: Cardiology;  Laterality: Bilateral;  fluoroscopy of the mechanical aortic valve    FUNCTIONAL ENDOSCOPIC SINUS SURGERY (FESS) Bilateral 6/14/2023    Procedure: FESS (FUNCTIONAL ENDOSCOPIC SINUS SURGERY);  Surgeon: Jono Russell MD;  [facility];  Service: ENT;  Laterality: Bilateral;    HERNIA REPAIR      INTRAOCULAR PROSTHESES INSERTION Left 11/13/2022    Procedure: INSERTION, IOL PROSTHESIS;  Surgeon: Alia Mckeon MD;  Location: [facility] OR 1ST FLR;  Service: Ophthalmology;  Laterality: Left;    INTRAOCULAR PROSTHESES INSERTION Right 12/04/2022    Procedure: INSERTION, IOL PROSTHESIS;  Surgeon: Alia Mckeon MD;  Location: [facility] OR 1ST FLR;  Service: Ophthalmology;  Laterality: Right;    LIGATION, ARTERY, ETHMOIDAL Left 2/28/2024    Procedure: LIGATION, ARTERY, ETHMOIDAL;  Surgeon: Jono Russell MD;  [facility];  Service: ENT;   Laterality: Left;    LIGATION, ARTERY, SPHENOPALATINE, ENDOSCOPIC Bilateral 6/14/2023    Procedure: LIGATION, ARTERY, SPHENOPALATINE, ENDOSCOPIC;  Surgeon: Jono Russell MD;  Location: [facility] OR 2ND FLR;  Service: ENT;  Laterality: Bilateral;    NASAL ENDOSCOPY N/A 8/8/2024    Procedure: ENDOSCOPY, NOSE;  Surgeon: Jono Russell MD;  Location: [facility] OR 2ND FLR;  Service: ENT;  Laterality: N/A;    PHACOEMULSIFICATION OF CATARACT Left 11/13/2022    Procedure: PHACOEMULSIFICATION, CATARACT;  Surgeon: Alia Mckeon MD;  Location: [facility] OR 1ST FLR;  Service: Ophthalmology;  Laterality: Left;    PHACOEMULSIFICATION OF CATARACT Right 12/04/2022    Procedure: PHACOEMULSIFICATION, CATARACT;  Surgeon: Alia Mckeon MD;  Location: [facility] OR 1ST FLR;  Service: Ophthalmology;  Laterality: Right;    SPINE SURGERY      TRANSESOPHAGEAL ECHOCARDIOGRAPHY N/A 3/22/2023    Procedure: ECHOCARDIOGRAM, TRANSESOPHAGEAL;  Surgeon: [facility] Diagnostic Provider;  Location: [facility] EP LAB;  Service: Anesthesiology;  Laterality: N/A;    TRANSESOPHAGEAL ECHOCARDIOGRAPHY N/A 4/11/2023    Procedure: ECHOCARDIOGRAM, TRANSESOPHAGEAL;  Surgeon: [facility] Diagnostic Provider;  Location: [facility] EP LAB;  Service: Anesthesiology;  Laterality: N/A;    VASECTOMY         Review of patient's allergies indicates:   Allergen Reactions    Lisinopril     Losartan      Intolerance- elevates potassium level       No current facility-administered medications on file prior to encounter.     Current Outpatient Medications on File Prior to Encounter   Medication Sig    acetaminophen (TYLENOL) 500 MG tablet Take 500 mg by mouth daily as needed for Pain.    albuterol (VENTOLIN HFA) 90 mcg/actuation inhaler Inhale 2 puffs into the lungs every 6 (six) hours as needed for Wheezing. Rescue    allopurinoL (ZYLOPRIM) 100 MG tablet Take 1 tablet (100 mg total) by mouth once daily.    bumetanide (BUMEX) 1 MG tablet Take 2 tablets (2 mg total) by mouth every other day.    ferrous  gluconate (FERGON) 324 MG tablet TAKE 1 TABLET EVERY DAY WITH BREAKFAST    isosorbide mononitrate (IMDUR) 60 MG 24 hr tablet Take 1 tablet (60 mg total) by mouth every evening.    omega-3 fatty acids/fish oil (FISH OIL-OMEGA-3 FATTY ACIDS) 300-1,000 mg capsule Take 1 capsule by mouth once daily.    oxymetazoline (AFRIN) 0.05 % nasal spray 2 sprays by Nasal route as needed (nose bleeds).    rosuvastatin (CRESTOR) 40 MG Tab TAKE 1 TABLET EVERY EVENING.    sodium chloride (SALINE NASAL) 0.65 % nasal spray Use 2 sprays by Nasal route every 4 hours. Apply into nasal cavities daily with nightly application of vaseline/aquaphor to anterior nares. Keep head of bed elevated.    traZODone (DESYREL) 50 MG tablet Take 1 tablet (50 mg total) by mouth every evening. As needed    warfarin (COUMADIN) 5 MG tablet TAKE 1/2 TABLET (2.5MG) SATURDAY, THEN TAKE 1 TABLET (5MG) ALL OTHER DAYS OR AS INSTRUCTED BY COUMADIN CLINIC     Family History       Problem Relation (Age of Onset)    Alcohol abuse Father    Diabetes Brother    Heart disease Mother, Father, Brother, Sister    Heart failure Mother, Father, Brother    No Known Problems Sister, Maternal Grandmother, Maternal Grandfather, Paternal Grandmother, Paternal Grandfather, Maternal Aunt, Maternal Uncle, Paternal Aunt, Paternal Uncle          Tobacco Use    Smoking status: Former     Current packs/day: 0.00     Average packs/day: 1 pack/day for 20.0 years (20.0 ttl pk-yrs)     Types: Cigarettes     Start date: 1960     Quit date: 1980     Years since quittin.4     Passive exposure: Never    Smokeless tobacco: Never   Substance and Sexual Activity    Alcohol use: No    Drug use: No    Sexual activity: Not Currently     Partners: Female     Review of Systems  Objective:     Vital Signs (Most Recent):  Temp: 97.9 °F (36.6 °C) (25)  Pulse: 65 (25)  Resp: 18 (25)  BP: (!) 161/71 (25)  SpO2: 98 % (25) Vital Signs (24h  Range):  Temp:  [97.9 °F (36.6 °C)-98.2 °F (36.8 °C)] 97.9 °F (36.6 °C)  Pulse:  [64-79] 65  Resp:  [14-20] 18  SpO2:  [98 %-100 %] 98 %  BP: (121-162)/(55-71) 161/71     Weight: 65.8 kg (145 lb)  Body mass index is 23.4 kg/m².     Physical Exam  HENT:      Nose:      Right Nostril: No epistaxis.      Left Nostril: No epistaxis.      Comments: Dry blood present in nostrils bilaterally. No active bleeding  Eyes:      Comments: Pale palpebra   Cardiovascular:      Rate and Rhythm: Normal rate.      Heart sounds: Murmur heard.   Pulmonary:      Breath sounds: Wheezing present.   Neurological:      Mental Status: He is alert.                Significant Labs: All pertinent labs within the past 24 hours have been reviewed.    Significant Imaging: I have reviewed all pertinent imaging results/findings within the past 24 hours.  Assessment/Plan:     * Acute blood loss anemia  Anemia is likely due to acute blood loss which was from Epistaxis related to warfarin . Most recent hemoglobin and hematocrit are listed below.  Recent Labs     02/24/25  1237   HGB 6.7*   HCT 22.2*       Plan  - If pt has bleed again, afrin instructions per ENT.   - Monitor serial CBC: Every 12 hours  - Transfuse PRBC if patient becomes hemodynamically unstable, symptomatic or H/H drops below 7/21.  - Patient has received 1 units of PRBCs on 2/24/25  - Patient's anemia is currently improving      Wheezes  Wheezes present on PE    Plan  - Duo-nebs  - monitor O2      History of epistaxis  Pt with chronic nosebleeds. Had multiple cauterizations in the past. ENT consulted. Plan for cauterization Wednesday. Holding anticoagulation.      H/O mechanical aortic valve replacement  Holding warfarin      Type 2 diabetes mellitus with stage 4 chronic kidney disease, without long-term current use of insulin  Creatine stable for now. BMP reviewed- noted Estimated Creatinine Clearance: 20.2 mL/min (A) (based on SCr of 2.5 mg/dL (H)). according to latest data. Based  on current GFR, CKD stage is stage 4 - GFR 15-29.  Monitor UOP and serial BMP and adjust therapy as needed. Renally dose meds. Avoid nephrotoxic medications and procedures.    Plan  - Low dose sliding scale  -Holding allopurinol    Hyperlipidemia associated with type 2 diabetes mellitus    Continue home statin      VTE Risk Mitigation (From admission, onward)           Ordered     Reason for No Pharmacological VTE Prophylaxis  Once        Question:  Reasons:  Answer:  Active Bleeding    02/24/25 1421     IP VTE HIGH RISK PATIENT  Once         02/24/25 1421     Place sequential compression device  Until discontinued         02/24/25 1421                           On 02/24/2025, patient should be placed in hospital observation services under my care in collaboration with Dr. pSencer.         Puma Arvizu MD  Department of Hospital Medicine  [facility] - Emergency Dept

## 2025-02-25 NOTE — PT/OT/SLP EVAL
Speech Language Pathology Evaluation/Discharge  Bedside Swallow    Patient Name:  Dariel Urbina   MRN:  [id number]  Admitting Diagnosis: Acute blood loss anemia    Recommendations:                 General Recommendations:  Follow-up not indicated  Diet recommendations:  Regular Diet - IDDSI Level 7, Thin liquids - IDDSI Level 0   Aspiration Precautions: Small bites/sips, 1 bite/sip at a time, Alternating bites/sips, Double swallow with each bite/sip, HOB to 90 degrees, Monitor for s/s of aspiration, Remain upright 30 minutes post meal, , and Strict aspiration precautions   General Precautions: Standard, aspiration, fall  Communication strategies:  none    Assessment:     Dariel Urbina is a 84 y.o. male with an SLP diagnosis of mild Dysphagia at baseline.  Pt appears safe to continue on baseline diet while implementing safe swallowing/aspiration precautions as list above.  No further skilled SLP services warranted at this time.     History:     Past Medical History:   Diagnosis Date    Anemia of chronic renal failure, stage 3 (moderate) 05/27/2015    Anticoagulant long-term use     Atherosclerosis of coronary artery bypass graft of native heart without angina pectoris 09/11/2012    3-27-18 [facility] Two vessel coronary artery disease.   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    Bilateral carotid artery disease 02/09/2017    Bleeding from the nose     Bleeding nose 03/21/2018    Cancer     Cataract     CHF (congestive heart failure)     CKD (chronic kidney disease) stage 3, GFR 30-59 ml/min 05/27/2015    Claudication of left lower extremity 09/17/2014    Colon polyp     Encounter for blood transfusion     Gastroesophageal reflux disease without esophagitis 03/19/2018    Gastrointestinal hemorrhage associated with intestinal diverticulosis 04/01/2018    Glaucoma     H/O mechanical aortic valve replacement 09/17/2014    History of gout 09/26/2012    Hyperparathyroidism due to renal  insufficiency 07/27/2015    Internal hemorrhoid 04/03/2018    Long term current use of anticoagulant therapy 09/26/2012    Mechanical heart valve present     Metabolic acidosis with normal anion gap and bicarbonate losses 03/20/2018    Mixed hyperlipidemia 09/26/2012    NSTEMI (non-ST elevated myocardial infarction) 03/21/2018    Obesity, diabetes, and hypertension syndrome 02/23/2016    Paroxysmal atrial fibrillation 02/06/2023    PVD (peripheral vascular disease) 09/11/2012    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       Past Surgical History:   Procedure Laterality Date    BACK SURGERY      CARDIAC CATHETERIZATION      CARDIAC VALVE REPLACEMENT      CARDIAC VALVE SURGERY      CARPAL TUNNEL RELEASE Right 05/19/2020    Procedure: RELEASE, CARPAL TUNNEL;  Surgeon: Rupesh Norris Jr., MD;  Location: [facility];  Service: Plastics;  Laterality: Right;    CATARACT EXTRACTION Left 11/13/2022        COLON SURGERY      COLONOSCOPY N/A 03/31/2017    Procedure: COLONOSCOPY;  Surgeon: Bruno Raymond MD;  Location: [facility] (4TH FLR);  Service: Endoscopy;  Laterality: N/A;  Patient's wife requesting date.    COLONOSCOPY N/A 04/03/2018    Procedure: COLONOSCOPY;  Surgeon: Bonifacio Pelletier MD;  Location: [facility] ENDO (2ND FLR);  Service: Endoscopy;  Laterality: N/A;    COLONOSCOPY N/A 08/13/2018    Procedure: COLONOSCOPY;  Surgeon: Kam Barba MD;  Location: [facility] ENDO (2ND FLR);  Service: Endoscopy;  Laterality: N/A;  2nd floor: PA pressure 49; hx of moderate-severe valve disease     per Coumadin clinic-Patient can hold 5 days with lovenox bridge       ok to schedule per Katarina    CONTROL  OF EPISTAXIS, POSTERIOR, USING NASAL PACKING OR CAUTERIZATION Bilateral 6/18/2024    Procedure: CONTROL OF EPISTAXIS, POSTERIOR, USING NASAL PACKING OR CAUTERIZATION;  Surgeon: Jono Russell MD;  [facility];  Service: ENT;  Laterality: Bilateral;    CONTROL OF EPISTAXIS, POSTERIOR, USING NASAL PACKING OR CAUTERIZATION Bilateral 8/8/2024    Procedure: CONTROL OF EPISTAXIS, POSTERIOR, USING NASAL PACKING OR CAUTERIZATION;  Surgeon: Jono Russell MD;  [facility];  Service: ENT;  Laterality: Bilateral;  suction bovie, nasopore, endoscopes    CONTROL OF EPISTAXIS, POSTERIOR, USING NASAL PACKING OR CAUTERIZATION Bilateral 12/30/2024    Procedure: CONTROL OF EPISTAXIS, POSTERIOR, USING NASAL PACKING OR CAUTERIZATION;  Surgeon: Jono Russell MD;  [facility];  Service: ENT;  Laterality: Bilateral;    CORONARY ANGIOGRAPHY N/A 10/04/2021    Procedure: Left heart cath +/- peripheral angiogram;  Surgeon: Jose Ruiz MD;  Location: [facility] CATH LAB;  Service: Cardiology;  Laterality: N/A;    CORONARY ANGIOGRAPHY N/A 3/2/2023    Procedure: Angiogram, Coronary;  Surgeon: Devon Garnica MD;  Location: [facility] CATH LAB;  Service: Cardiology;  Laterality: N/A;    CORONARY ANGIOPLASTY      CORONARY ARTERY BYPASS GRAFT      CORONARY BYPASS GRAFT ANGIOGRAPHY  10/04/2021    Procedure: Bypass graft study;  Surgeon: Jose Ruiz MD;  Location: [facility] CATH LAB;  Service: Cardiology;;    CORONARY BYPASS GRAFT ANGIOGRAPHY  3/2/2023    Procedure: Bypass graft study;  Surgeon: Devon Garnica MD;  Location: [facility] CATH LAB;  Service: Cardiology;;    ECHOCARDIOGRAM,TRANSESOPHAGEAL N/A 4/14/2023    Procedure: Transesophageal echo (KIRSTEN) intra-procedure log documentation;  Surgeon: [facility] Diagnostic Provider;  Location: [facility] EP LAB;  Service: Cardiology;  Laterality: N/A;    ENDOSCOPIC NASAL CAUTERIZATION Bilateral 11/3/2023    Procedure: CAUTERIZATION, NOSE, ENDOSCOPIC;  Surgeon: Jono Russell  MD JACQUELYN;  Location: [facility] OR 2ND FLR;  Service: ENT;  Laterality: Bilateral;    ENDOSCOPIC NASAL CAUTERIZATION N/A 12/18/2023    Procedure: CAUTERIZATION, NOSE, ENDOSCOPIC;  Surgeon: Jono Russell MD;  Location: [facility] OR 2ND FLR;  Service: ENT;  Laterality: N/A;    EYE SURGERY      FESS, WITH IMAGING GUIDANCE Left 2/28/2024    Procedure: FESS, WITH IMAGING GUIDANCE;  Surgeon: Jono Russell MD;  Location: [facility] OR 2ND FLR;  Service: ENT;  Laterality: Left;  Scan loaded ENT [employer]. CL    FLUOROSCOPY Bilateral 10/29/2024    Procedure: Fluoroscopy;  Surgeon: Sameer Espitia MD;  Location: [facility] CATH LAB/EP;  Service: Cardiology;  Laterality: Bilateral;  fluoroscopy of the mechanical aortic valve    FUNCTIONAL ENDOSCOPIC SINUS SURGERY (FESS) Bilateral 6/14/2023    Procedure: FESS (FUNCTIONAL ENDOSCOPIC SINUS SURGERY);  Surgeon: Jono Russell MD;  [facility] 2ND FLR;  Service: ENT;  Laterality: Bilateral;    HERNIA REPAIR      INTRAOCULAR PROSTHESES INSERTION Left 11/13/2022    Procedure: INSERTION, IOL PROSTHESIS;  Surgeon: Alia Mckeon MD;  Location: [facility] OR 1ST FLR;  Service: Ophthalmology;  Laterality: Left;    INTRAOCULAR PROSTHESES INSERTION Right 12/04/2022    Procedure: INSERTION, IOL PROSTHESIS;  Surgeon: Alia Mckeon MD;  Location: [facility] OR 1ST FLR;  Service: Ophthalmology;  Laterality: Right;    LIGATION, ARTERY, ETHMOIDAL Left 2/28/2024    Procedure: LIGATION, ARTERY, ETHMOIDAL;  Surgeon: Jono Russell MD;  Location: [facility] OR 2ND FLR;  Service: ENT;  Laterality: Left;    LIGATION, ARTERY, SPHENOPALATINE, ENDOSCOPIC Bilateral 6/14/2023    Procedure: LIGATION, ARTERY, SPHENOPALATINE, ENDOSCOPIC;  Surgeon: Jono Russell MD;  Location: [facility] OR 2ND FLR;  Service: ENT;  Laterality: Bilateral;    NASAL ENDOSCOPY N/A 8/8/2024    Procedure: ENDOSCOPY, NOSE;  Surgeon: Jono Russell MD;  Location: [facility] OR 2ND FLR;  Service: ENT;  Laterality: N/A;    PHACOEMULSIFICATION OF CATARACT Left  "11/13/2022    Procedure: PHACOEMULSIFICATION, CATARACT;  Surgeon: Alia Mckeon MD;  Location: [facility] OR [address] [place] [address];  Service: Ophthalmology;  Laterality: Left;    PHACOEMULSIFICATION OF CATARACT Right 12/04/2022    Procedure: PHACOEMULSIFICATION, CATARACT;  Surgeon: Alia Mckeon MD;  Location: [facility] OR [facility] FLR;  Service: Ophthalmology;  Laterality: Right;    SPINE SURGERY      TRANSESOPHAGEAL ECHOCARDIOGRAPHY N/A 3/22/2023    Procedure: ECHOCARDIOGRAM, TRANSESOPHAGEAL;  Surgeon: [facility] Diagnostic Provider;  Location: [facility] EP LAB;  Service: Anesthesiology;  Laterality: N/A;    TRANSESOPHAGEAL ECHOCARDIOGRAPHY N/A 4/11/2023    Procedure: ECHOCARDIOGRAM, TRANSESOPHAGEAL;  Surgeon: [facility] Diagnostic Provider;  Location: [facility] EP LAB;  Service: Anesthesiology;  Laterality: N/A;    VASECTOMY       HPI: 84 y.o. male with  paroxysmal afib, mechanical aortic valve replacement on Coumadin, CHF, CKD stage 3, T2DM, HTN, recurrent epistaxis s/p multiple cauterizations who is admitted to hospital medicine for acute blood loss anemia 2/2 to profound epistaxis.     Pt noted to have intermittent nosebleeds since prior cauterization which resolve with pressure and afrin. Usually has 1-3 epistaxis episodes a day that last for 1-2 hours. But this past week they have worsened in severity and have been more difficult to control with pressure and Afrin. Today morning 2 AM, he had a large episode of epistaxis that wouldn't resolve with Afrin and pressure till about 11:30 AM prior to arrival at ED. Denies any trauma to the area or any specific triggering events. He reports this past week he has had cough, sore throat, and congestion. He denies any fatigue, dizziness, chest pain, or abdominal pain.         Prior Intubation HX:  none during this admission    Modified Barium Swallow: 1/31/24: "IMPRESSIONS:   This 83 y.o. man appears to present with  1.  Mild to mild-moderate oropharyngeal dysphagia characterized by premature spillage of liquids " "resulting in consistent flash laryngeal penetration and reduced pharyngeal contraction resulting in pharyngeal residue (trace thin liquid and thick puree, significant masticated solid).  A trace volume of thin liquid residue was aspirated (x1) on a subsequent swallow when the swallow contraction expressed the fluid from the valleculae into the airway.      RECOMMENDATIONS/PLAN OF CARE:   It is felt that Mr. Urbina would benefit from  1.  Continuation of his current regular consistency diet with thin liquids using the following strategies and common aspiration precautions, including, but not limited to  A.  Appropriate upright seating for all eating and drinking.  B.  Preparing foods to a tender, moist state and/or adding moisture (e.g., condiments, gravies, sauces, rouxs) and/or addition a tiny sip of liquid to the oral cavity during mastication to facilitate breakdown of the food and clearance through the pharynx.  C.  Dry swallows after liquids.  D.  Liquid wash (small sips) to help clear solid residue.  E.  Pills with liquids or embedded in puree.  F.  Monitoring for any signs/symptoms of aspiration (such as wet/gurgly voice that does not clear with coughing, inability to make any voice sounds, any persistent coughing with oral intake, otherwise unexplained fever, unexplained increased or new difficulty or discomfort breathing, unexplained increase in sleepiness/lethargy/significant fatigue, unexplained increase or new onset confusion or change in cognitive functioning, or any other unexplained change in health or well-being that could be related to swallowing).   2.  Consider short course of ST (1-2 visits) for training in above and in swallowing exercises to strengthen swallow contraction, including, but not limited to               A.  Effortful swallow              B.  CTAR or Shaker  3.  Continued f/u with OSWALD Muir as directed."       Chest X-Rays: 2/24/25: FINDINGS:  Postoperative changes as before.  " "Mild cardiomegaly.  No significant airspace consolidation or pleural effusion identified    Prior diet: regular/thins - pt and wife reporting decreased PO intake over the past 6 months due to decreased appetite and change in taste buds      Subjective     "I'm having diarrhea." Pt reported and PCT told upon entry and stated plan to notify nurse. Pt still agreeable to participate in swallow evaluation.     Pain/Comfort:  Pain Rating 1: 0/10    Respiratory Status: Room air    Objective:     Oral Musculature Evaluation  Oral Musculature: WFL  Dentition: scattered dentition (minimal upper dentition, lower partial dental plate)  Secretion Management: adequate  Mucosal Quality: adequate  Mandibular Strength and Mobility: WFL  Oral Labial Strength and Mobility: WFL  Lingual Strength and Mobility: WFL  Buccal Strength and Mobility: WFL  Volitional Cough: strong  Volitional Swallow: elicited  Voice Prior to PO Intake: dry, clear    Bedside Swallow Eval:   Consistencies Assessed:  Thin liquids cup x 1, straw sips x 3  Soft solids rice and gravy x 2  Solids 1/5 cracker x 1      Oral Phase:   WFL    Pharyngeal Phase:   no overt clinical signs/symptoms of aspiration    Compensatory Strategies  Pt encouraged to take small bites/sips and alternate bites/sips    Treatment: Education was provided to pt and wife regarding role of SLP, purpose of swallowing evaluation, results and recommendations from previous swallowing assessment (MBSS 1/31/24), aspiration and its risks, s/s of aspiration, and aspiration precautions and compensatory strategies recommended to reduce risks of aspiration.  Understanding was demonstrated.  No further skilled SLP services warranted at this time as pt's swallowing function appears to be at baseline and pt is able to display proper use of safe swallowing techniques to reduce risks of aspiration.    Goals:   Multidisciplinary Problems       SLP Goals       Not on file                    Plan:     Patient to " be seen:      Plan of Care expires:     Plan of Care reviewed with:  patient, spouse   SLP Follow-Up:  No       Discharge recommendations:  No Therapy Indicated     Time Tracking:     SLP Treatment Date:   02/25/25  Speech Start Time:  1401  Speech Stop Time:  1417     Speech Total Time (min):  16 min    Billable Minutes: Eval Swallow and Oral Function 8 and Self Care/Home Management Training 8    02/25/2025

## 2025-02-25 NOTE — SUBJECTIVE & OBJECTIVE
Interval History: NAEON, AFVSS. No epistaxis overnight. Received 1u pRBC in ED yesterday afternoon. No other specific complaints    Medications:  Continuous Infusions:  Scheduled Meds:   albuterol-ipratropium  3 mL Nebulization Q4H WAKE    atorvastatin  80 mg Oral Daily    isosorbide mononitrate  60 mg Oral QHS    potassium bicarbonate  40 mEq Oral Q2H    traZODone  50 mg Oral QHS     PRN Meds:  Current Facility-Administered Medications:     0.9%  NaCl infusion (for blood administration), , Intravenous, Q24H PRN    dextrose 50%, 12.5 g, Intravenous, PRN    dextrose 50%, 25 g, Intravenous, PRN    glucagon (human recombinant), 1 mg, Intramuscular, PRN    glucose, 16 g, Oral, PRN    glucose, 24 g, Oral, PRN    insulin aspart U-100, 0-5 Units, Subcutaneous, QID (AC + HS) PRN    naloxone, 0.02 mg, Intravenous, PRN    oxymetazoline, 2 spray, Each Nostril, PRN    sodium chloride 0.9%, 10 mL, Intravenous, Q12H PRN     Review of patient's allergies indicates:   Allergen Reactions    Lisinopril     Losartan      Intolerance- elevates potassium level     Objective:     Vital Signs (24h Range):  Temp:  [97.5 °F (36.4 °C)-98.2 °F (36.8 °C)] 97.5 °F (36.4 °C)  Pulse:  [60-79] 62  Resp:  [14-20] 18  SpO2:  [96 %-100 %] 98 %  BP: (121-162)/(55-71) 142/63       Lines/Drains/Airways       Peripheral Intravenous Line  Duration                  Peripheral IV - Single Lumen 02/24/25 1234 20 G Anterior;Distal;Right Upper Arm <1 day                     Physical Exam  NAD  Resting in bed comfortably   Head atraumatic   EOMI   Auricles WNL AU  Dried crust in b/l nasal cavity, large anterior septal perforation, no active bleeding   No active bleeding in posterior oropharynx   Neck soft  Unlabored breathing, no stridor       Significant Labs:  CBC:   Recent Labs   Lab 02/24/25 2014   WBC 6.05   RBC 2.56*   HGB 7.2*   HCT 24.2*   *   MCV 95   MCH 28.1   MCHC 29.8*     CMP:   Recent Labs   Lab 02/25/25  0447   GLU 67*   CALCIUM 9.1    ALBUMIN 2.8*   PROT 6.3      K 3.6   CO2 18*      BUN 43*   CREATININE 2.2*   ALKPHOS 69   ALT 12   AST 25   BILITOT 0.9       Significant Diagnostics:  X-Ray: I have reviewed all pertinent results/findings within the past 24 hours and my personal findings are:  no acute findings

## 2025-02-25 NOTE — ASSESSMENT & PLAN NOTE
Anemia is likely due to acute blood loss which was from Epistaxis related to warfarin . Most recent hemoglobin and hematocrit are listed below.  Recent Labs     02/24/25  1237   HGB 6.7*   HCT 22.2*       Plan  - If pt has bleed again, afrin instructions per ENT.   - Monitor serial CBC: Every 12 hours  - Transfuse PRBC if patient becomes hemodynamically unstable, symptomatic or H/H drops below 7/21.  - Patient has received 1 units of PRBCs on 2/24/25  - Patient's anemia is currently improving

## 2025-02-25 NOTE — PROGRESS NOTES
[facility] - [facility]  Otorhinolaryngology-Head & Neck Surgery  Progress Note    Subjective:     Post-Op Info:  Procedure(s) (LRB):  CAUTERIZATION, NOSE, ENDOSCOPIC (N/A)      Hospital Day: 2     Interval History: TINA OLIVO. No epistaxis overnight. Received 1u pRBC in ED yesterday afternoon. No other specific complaints    Medications:  Continuous Infusions:  Scheduled Meds:   albuterol-ipratropium  3 mL Nebulization Q4H WAKE    atorvastatin  80 mg Oral Daily    isosorbide mononitrate  60 mg Oral QHS    potassium bicarbonate  40 mEq Oral Q2H    traZODone  50 mg Oral QHS     PRN Meds:  Current Facility-Administered Medications:     0.9%  NaCl infusion (for blood administration), , Intravenous, Q24H PRN    dextrose 50%, 12.5 g, Intravenous, PRN    dextrose 50%, 25 g, Intravenous, PRN    glucagon (human recombinant), 1 mg, Intramuscular, PRN    glucose, 16 g, Oral, PRN    glucose, 24 g, Oral, PRN    insulin aspart U-100, 0-5 Units, Subcutaneous, QID (AC + HS) PRN    naloxone, 0.02 mg, Intravenous, PRN    oxymetazoline, 2 spray, Each Nostril, PRN    sodium chloride 0.9%, 10 mL, Intravenous, Q12H PRN     Review of patient's allergies indicates:   Allergen Reactions    Lisinopril     Losartan      Intolerance- elevates potassium level     Objective:     Vital Signs (24h Range):  Temp:  [97.5 °F (36.4 °C)-98.2 °F (36.8 °C)] 97.5 °F (36.4 °C)  Pulse:  [60-79] 62  Resp:  [14-20] 18  SpO2:  [96 %-100 %] 98 %  BP: (121-162)/(55-71) 142/63       Lines/Drains/Airways       Peripheral Intravenous Line  Duration                  Peripheral IV - Single Lumen 02/24/25 1234 20 G Anterior;Distal;Right Upper Arm <1 day                     Physical Exam  NAD  Resting in bed comfortably   Head atraumatic   EOMI   Auricles WNL AU  Dried crust in b/l nasal cavity, large anterior septal perforation, no active bleeding   No active bleeding in posterior oropharynx   Neck soft  Unlabored breathing, no stridor       Significant Labs:  CBC:    Recent Labs   Lab 02/24/25 2014   WBC 6.05   RBC 2.56*   HGB 7.2*   HCT 24.2*   *   MCV 95   MCH 28.1   MCHC 29.8*     CMP:   Recent Labs   Lab 02/25/25  0447   GLU 67*   CALCIUM 9.1   ALBUMIN 2.8*   PROT 6.3      K 3.6   CO2 18*      BUN 43*   CREATININE 2.2*   ALKPHOS 69   ALT 12   AST 25   BILITOT 0.9       Significant Diagnostics:  X-Ray: I have reviewed all pertinent results/findings within the past 24 hours and my personal findings are:  no acute findings  Assessment/Plan:     History of epistaxis  84 y.o. with history of recurrent epistaxis, on Coumadin for heart valve, who is s/p bilateral SP ligation and bilateral embolization and multiple cauterizations by ENT. He presents due to epistaxis 2/24/25. He is currently hemostatic in the ER. Hgb was 6.7, s/p 1u pRBC in the ER. Admitted to  for observation    - No packing in place currently - currently hemostatic  - Keep head of bed elevated  - Apply Afrin to affected nasal cavity if epistaxis recurs, please see detailed instructions below   - Plan to OR on Wednesday 2/26 for nasal endoscopy and control of epistaxis. Will consent later this AM.   - Please call ENT with questions  - Remainder of care per primary team    Aftercare/ moisturizing recommendations to decrease frequency of nosebleeds:  - Daily nasal saline sprays/rinses  - Nightly application of vaseline/aquaphor to anterior nares  - Room humidification  - Avoidance of nasal cannula if possible (always with humidification and please use face tent, nasal cup, facemask if possible)  Management of medical conditions contributing to epistaxis (coagulopathy, hypertension, etc.)  - Humidification of CPAP appliance if applicable    If rebleeding occurs:   - Apply Afrin liberally to both nostrils  - Apply pressure over the soft part of the nose for 15 minutes (clip or digital pressure, important to have pressure over soft part of nose and consistent pressure (do not release pressure at  any point))  - Instruct patient to lean forward, avoid swallowing blood. This can cause nausea and vomiting  - Can repeat these steps above up to 3 times  - Call/reconsult ENT or return to the nearest emergency department if this is unsuccessful          Xiomara Strong MD  Otorhinolaryngology-Head & Neck Surgery  [facility]

## 2025-02-25 NOTE — ASSESSMENT & PLAN NOTE
Pt with chronic nosebleeds. Had multiple cauterizations in the past. ENT consulted. Plan for cauterization Wednesday. Holding anticoagulation.

## 2025-02-25 NOTE — PROGRESS NOTES
[facility] Medicine  Progress Note    Patient Name: Dariel Urbina  MRN: [id number]  Patient Class: OP- Observation   Admission Date: 2/24/2025  Length of Stay: 0 days  Attending Physician: Gina Spencer MD  Primary Care Provider: Devon Langston Jr., MD        Subjective     Principal Problem:Acute blood loss anemia        HPI:  84 y.o. male with  paroxysmal afib, mechanical aortic valve replacement on Coumadin, CHF, CKD stage 3, T2DM, HTN, recurrent epistaxis s/p multiple cauterizations who is admitted to hospital medicine for acute blood loss anemia 2/2 to profound epistaxis.     Pt noted to have intermittent nosebleeds since prior cauterization which resolve with pressure and afrin. Usually has 1-3 epistaxis episodes a day that last for 1-2 hours. But this past week they have worsened in severity and have been more difficult to control with pressure and Afrin. Today morning 2 AM, he had a large episode of epistaxis that wouldn't resolve with Afrin and pressure till about 11:30 AM prior to arrival at ED. Denies any trauma to the area or any specific triggering events. He reports this past week he has had cough, sore throat, and congestion. He denies any fatigue, dizziness, chest pain, or abdominal pain.     ED Course: XR chest unremarkable, Hgb 6.7, Hct 22.2 - 1 unit pRBC given    Overview/Hospital Course:  84 y.o. male with  paroxysmal afib, mechanical aortic valve replacement on Coumadin, CHF, CKD stage 3, T2DM, HTN, recurrent epistaxis s/p multiple cauterizations who is admitted to hospital medicine for acute blood loss anemia 2/2 to profound epistaxis. On admission, HgB- 6.7, received 1 unit of pRBCs. Repeat HgB s/p transfusion is 7.2. PT-INR goal per coumadin clinic is 2.2.5. Has wheezes on physical exam which is new. RIP panel ordered. ENT plans for cauterization on 2/26.     Interval History: No acute events overnight. Pt doing well, no bleed overnight. He does complain of wheezes and  difficult breathing when laying flat due to clotted nosebleeds.     Review of patient's allergies indicates:   Allergen Reactions    Lisinopril     Losartan      Intolerance- elevates potassium level       No current facility-administered medications on file prior to encounter.     Current Outpatient Medications on File Prior to Encounter   Medication Sig    acetaminophen (TYLENOL) 500 MG tablet Take 500 mg by mouth daily as needed for Pain.    albuterol (VENTOLIN HFA) 90 mcg/actuation inhaler Inhale 2 puffs into the lungs every 6 (six) hours as needed for Wheezing. Rescue    allopurinoL (ZYLOPRIM) 100 MG tablet Take 1 tablet (100 mg total) by mouth once daily.    bumetanide (BUMEX) 1 MG tablet Take 2 tablets (2 mg total) by mouth every other day.    ferrous gluconate (FERGON) 324 MG tablet TAKE 1 TABLET EVERY DAY WITH BREAKFAST    isosorbide mononitrate (IMDUR) 60 MG 24 hr tablet Take 1 tablet (60 mg total) by mouth every evening.    omega-3 fatty acids/fish oil (FISH OIL-OMEGA-3 FATTY ACIDS) 300-1,000 mg capsule Take 1 capsule by mouth once daily.    oxymetazoline (AFRIN) 0.05 % nasal spray 2 sprays by Nasal route as needed (nose bleeds).    rosuvastatin (CRESTOR) 40 MG Tab TAKE 1 TABLET EVERY EVENING.    sodium chloride (SALINE NASAL) 0.65 % nasal spray Use 2 sprays by Nasal route every 4 hours. Apply into nasal cavities daily with nightly application of vaseline/aquaphor to anterior nares. Keep head of bed elevated.    traZODone (DESYREL) 50 MG tablet Take 1 tablet (50 mg total) by mouth every evening. As needed    warfarin (COUMADIN) 5 MG tablet TAKE 1/2 TABLET (2.5MG) SATURDAY, THEN TAKE 1 TABLET (5MG) ALL OTHER DAYS OR AS INSTRUCTED BY COUMADIN CLINIC     Family History       Problem Relation (Age of Onset)    Alcohol abuse Father    Diabetes Brother    Heart disease Mother, Father, Brother, Sister    Heart failure Mother, Father, Brother    No Known Problems Sister, Maternal Grandmother, Maternal  Grandfather, Paternal Grandmother, Paternal Grandfather, Maternal Aunt, Maternal Uncle, Paternal Aunt, Paternal Uncle          Tobacco Use    Smoking status: Former     Current packs/day: 0.00     Average packs/day: 1 pack/day for 20.0 years (20.0 ttl pk-yrs)     Types: Cigarettes     Start date: 1960     Quit date: 1980     Years since quittin.4     Passive exposure: Never    Smokeless tobacco: Never   Substance and Sexual Activity    Alcohol use: No    Drug use: No    Sexual activity: Not Currently     Partners: Female     Review of Systems  Objective:     Vital Signs (Most Recent):  Temp: 97.9 °F (36.6 °C) (25)  Pulse: 65 (25)  Resp: 18 (25)  BP: (!) 161/71 (25)  SpO2: 98 % (25) Vital Signs (24h Range):  Temp:  [97.9 °F (36.6 °C)-98.2 °F (36.8 °C)] 97.9 °F (36.6 °C)  Pulse:  [64-79] 65  Resp:  [14-20] 18  SpO2:  [98 %-100 %] 98 %  BP: (121-162)/(55-71) 161/71     Weight: 65.8 kg (145 lb)  Body mass index is 23.4 kg/m².     Physical Exam  HENT:      Nose:      Right Nostril: No epistaxis.      Left Nostril: No epistaxis.      Comments: Dry blood present in nostrils bilaterally. No active bleeding  Eyes:      Comments: Pale palpebra   Cardiovascular:      Rate and Rhythm: Normal rate.      Heart sounds: Murmur heard.   Pulmonary:      Breath sounds: Wheezing present.   Neurological:      Mental Status: He is alert.                Significant Labs: All pertinent labs within the past 24 hours have been reviewed.    Significant Imaging: I have reviewed all pertinent imaging results/findings within the past 24 hours.    Assessment and Plan     * Acute blood loss anemia  Anemia is likely due to acute blood loss which was from Epistaxis related to warfarin . Most recent hemoglobin and hematocrit are listed below.  Recent Labs     25  1237   HGB 6.7*   HCT 22.2*       Plan  - If pt has bleed again, afrin instructions per ENT.   - Monitor serial  CBC: Every 12 hours  - Transfuse PRBC if patient becomes hemodynamically unstable, symptomatic or H/H drops below 7/21.  - Patient has received 1 units of PRBCs on 2/24/25  - Patient's anemia is currently improving      Wheezes  Wheezes present on PE    Plan  -f/u RIP panel  - Duo-nebs  - monitor O2      History of epistaxis  Pt with chronic nosebleeds. Had multiple cauterizations in the past. ENT consulted. Plan for cauterization Wednesday. Holding anticoagulation.      H/O mechanical aortic valve replacement  Holding warfarin      Type 2 diabetes mellitus with stage 4 chronic kidney disease, without long-term current use of insulin  Creatine stable for now. BMP reviewed- noted Estimated Creatinine Clearance: 20.2 mL/min (A) (based on SCr of 2.5 mg/dL (H)). according to latest data. 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  Monitor UOP and serial BMP and adjust therapy as needed. Renally dose meds. Avoid nephrotoxic medications and procedures.    Plan  - Low dose sliding scale  -Holding allopurinol    Hyperlipidemia associated with type 2 diabetes mellitus    Continue home statin    Chronic anticoagulation  Holding in setting of acute blood loss        VTE Risk Mitigation (From admission, onward)           Ordered     Reason for No Pharmacological VTE Prophylaxis  Once        Question:  Reasons:  Answer:  Active Bleeding    02/24/25 1421     IP VTE HIGH RISK PATIENT  Once         02/24/25 1421     Place sequential compression device  Until discontinued         02/24/25 1421                    Discharge Planning   ASTER: 2/27/2025     Code Status: Full Code   Medical Readiness for Discharge Date:                            Puma Arvizu MD  Department of Hospital Medicine   [facility]

## 2025-02-25 NOTE — HOSPITAL COURSE
84 y.o. male with  paroxysmal afib, mechanical aortic valve replacement on Coumadin, CHF, CKD stage 3, T2DM, HTN, recurrent epistaxis s/p multiple cauterizations who is admitted to hospital medicine for acute blood loss anemia 2/2 to profound epistaxis. On admission, HgB- 6.7, received 1 unit of pRBCs. Repeat HgB s/p transfusion is 7.2. PT-INR goal per coumadin clinic is 2.2.5. Has wheezes on physical exam which is new. Covid/Flu/RSV panel unremarkable CXR concerning for possible infectious process. ENT planned for cauterization on 2/26, however pt wheezing worsened and it was decided to postpone surgery. Started on Ceftriaxone/Doxy for CAP.  Monitoring Pt-INR. Started on Warfarin 5 mg but PT-INR subtherapeutic now. Started on Heparin to bridge, but pt experienced another episode of epistaxis on 3/1. Heparin discontinued. Increased warfarin to 5 mg daily due to subtherapeutic INR and subsequently decreased to 2.5 mg. Underwent cauterization by ENT on 3/6, doing well. Transfused 1 post procedure, repeat HgB-8. Pt doing well over all s/p procedure. He is stable for discharge. Will discharge pt with Coumadin 2.5 mg till follow up with Warfarin clinic.

## 2025-02-25 NOTE — ASSESSMENT & PLAN NOTE
Creatine stable for now. BMP reviewed- noted Estimated Creatinine Clearance: 20.2 mL/min (A) (based on SCr of 2.5 mg/dL (H)). according to latest data. 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  Monitor UOP and serial BMP and adjust therapy as needed. Renally dose meds. Avoid nephrotoxic medications and procedures.    Plan  - Low dose sliding scale  -Holding allopurinol

## 2025-02-25 NOTE — PLAN OF CARE
[facility] - Med Surg  Initial Discharge Assessment       Primary Care Provider: Devon Langston Jr., MD    Admission Diagnosis: Epistaxis [R04.0]  Chest pain [R07.9]    Admission Date: 2/24/2025  Expected Discharge Date: 2/27/2025    Transition of Care Barriers: (P) None    Payor: [employer] MANAGED MEDICARE / Plan: [employer] MEDICARE HMO / Product Type: Capitation /     Extended Emergency Contact Information  Primary Emergency Contact: Ameena Urbina  Address: [address] [place] [address] [place]  Mobile Phone: [phone]  Relation: Spouse    Discharge Plan A: (P) Home with family, Home  Discharge Plan B: (P) Home, Home with family      [facility] - [place], [place] - [address]  [address] [address]  Phone: [phone] Fax: [phone]    [facility]  [address] [address]  Phone: [phone] Fax: [phone]    [facility] #[id number] - [place], [place] - [address]  [address]  [place] [address]  Phone: [phone] Fax: [phone]      Initial Assessment (most recent)       Adult Discharge Assessment - 02/25/25 1549          Discharge Assessment    Assessment Type Discharge Planning Assessment (P)      Confirmed/corrected address, phone number and insurance Yes (P)      Confirmed Demographics Correct on Facesheet (P)      Source of Information patient (P)      Communicated ASTER with patient/caregiver Yes (P)      Reason For Admission acute blood loss (P)      People in Home spouse (P)      Facility Arrived From: Home (P)      Do you expect to return to your current living situation? Yes (P)      Do you have help at home or someone to help you manage your care at home? Yes (P)      Who are your caregiver(s) and their phone number(s)? Ameena Urbina (spouse) [phone] (P)      Prior to hospitilization cognitive status: Alert/Oriented (P)      Current cognitive status: Alert/Oriented (P)      Walking or  Climbing Stairs Difficulty no (P)      Dressing/Bathing Difficulty no (P)      Home Accessibility wheelchair accessible (P)      Equipment Currently Used at Home none (P)      Readmission within 30 days? No (P)      Patient currently being followed by outpatient case management? Yes (P)      If yes, name of outpatient case management following: [facility] outpatient case management (P)      Do you currently have service(s) that help you manage your care at home? No (P)      Do you take prescription medications? Yes (P)      Do you have prescription coverage? Yes (P)      Coverage [employer] Managed Medicare (P)      Do you have any problems affording any of your prescribed medications? No (P)      Is the patient taking medications as prescribed? yes (P)      Who is going to help you get home at discharge? Ameena Urbina (spouse) [phone] (P)      How do you get to doctors appointments? car, drives self;family or friend will provide (P)      Are you on dialysis? No (P)      Do you take coumadin? No (P)      Discharge Plan A Home with family;Home (P)      Discharge Plan B Home;Home with family (P)      DME Needed Upon Discharge  none (P)      Discharge Plan discussed with: Patient;Spouse/sig other (P)      Name(s) and Number(s) Ameena Urbina (spouse) [phone] (P)      Transition of Care Barriers None (P)         Physical Activity    On average, how many days per week do you engage in moderate to strenuous exercise (like a brisk walk)? 0 days (P)      On average, how many minutes do you engage in exercise at this level? 0 min (P)         Financial Resource Strain    How hard is it for you to pay for the very basics like food, housing, medical care, and heating? Not hard at all (P)         Housing Stability    In the last 12 months, was there a time when you were not able to pay the mortgage or rent on time? No (P)      At any time in the past 12 months, were you homeless or living in a shelter (including now)? No (P)          Transportation Needs    Has the lack of transportation kept you from medical appointments, meetings, work or from getting things needed for daily living? No (P)         Food Insecurity    Within the past 12 months, you worried that your food would run out before you got the money to buy more. Never true (P)      Within the past 12 months, the food you bought just didn't last and you didn't have money to get more. Never true (P)         Stress    Do you feel stress - tense, restless, nervous, or anxious, or unable to sleep at night because your mind is troubled all the time - these days? Only a little (P)         Social Isolation    How often do you feel lonely or isolated from those around you?  Never (P)         Alcohol Use    Q1: How often do you have a drink containing alcohol? Never (P)      Q2: How many drinks containing alcohol do you have on a typical day when you are drinking? Patient does not drink (P)      Q3: How often do you have six or more drinks on one occasion? Never (P)         [employer]    In the past 12 months has the electric, gas, oil, or water company threatened to shut off services in your home? No (P)         Health Literacy    How often do you need to have someone help you when you read instructions, pamphlets, or other written material from your doctor or pharmacy? Never (P)         OTHER    Name(s) of People in Home Ameena rUbina (spouse) [phone] (P)                         CM to the bedside to complete discharge assessment.  Pt is independent of all ADLs and ambulate without the use of equipment. Pt lives in a one story home with no steps. Pt denies smoking, the use of alcohol or receives dialysis. Pt and spouse states they have no food insecurities at this time. Pt's spouse states she will assist with transportation upon discharge.    Discharge Plan A and Plan B have been determined by review of patient's clinical status, future medical and therapeutic needs, and  coverage/benefits for post-acute care in coordination with multidisciplinary team members.     Maria Del Carmen Singleton, MSN   [facility]  [phone]

## 2025-02-25 NOTE — SUBJECTIVE & OBJECTIVE
Interval History: No acute events overnight. Pt doing well, no bleed overnight. He does complain of wheezes and difficult breathing when laying flat due to clotted nosebleeds.     Review of patient's allergies indicates:   Allergen Reactions    Lisinopril     Losartan      Intolerance- elevates potassium level       No current facility-administered medications on file prior to encounter.     Current Outpatient Medications on File Prior to Encounter   Medication Sig    acetaminophen (TYLENOL) 500 MG tablet Take 500 mg by mouth daily as needed for Pain.    albuterol (VENTOLIN HFA) 90 mcg/actuation inhaler Inhale 2 puffs into the lungs every 6 (six) hours as needed for Wheezing. Rescue    allopurinoL (ZYLOPRIM) 100 MG tablet Take 1 tablet (100 mg total) by mouth once daily.    bumetanide (BUMEX) 1 MG tablet Take 2 tablets (2 mg total) by mouth every other day.    ferrous gluconate (FERGON) 324 MG tablet TAKE 1 TABLET EVERY DAY WITH BREAKFAST    isosorbide mononitrate (IMDUR) 60 MG 24 hr tablet Take 1 tablet (60 mg total) by mouth every evening.    omega-3 fatty acids/fish oil (FISH OIL-OMEGA-3 FATTY ACIDS) 300-1,000 mg capsule Take 1 capsule by mouth once daily.    oxymetazoline (AFRIN) 0.05 % nasal spray 2 sprays by Nasal route as needed (nose bleeds).    rosuvastatin (CRESTOR) 40 MG Tab TAKE 1 TABLET EVERY EVENING.    sodium chloride (SALINE NASAL) 0.65 % nasal spray Use 2 sprays by Nasal route every 4 hours. Apply into nasal cavities daily with nightly application of vaseline/aquaphor to anterior nares. Keep head of bed elevated.    traZODone (DESYREL) 50 MG tablet Take 1 tablet (50 mg total) by mouth every evening. As needed    warfarin (COUMADIN) 5 MG tablet TAKE 1/2 TABLET (2.5MG) SATURDAY, THEN TAKE 1 TABLET (5MG) ALL OTHER DAYS OR AS INSTRUCTED BY COUMADIN CLINIC     Family History       Problem Relation (Age of Onset)    Alcohol abuse Father    Diabetes Brother    Heart disease Mother, Father, Brother, Sister     Heart failure Mother, Father, Brother    No Known Problems Sister, Maternal Grandmother, Maternal Grandfather, Paternal Grandmother, Paternal Grandfather, Maternal Aunt, Maternal Uncle, Paternal Aunt, Paternal Uncle          Tobacco Use    Smoking status: Former     Current packs/day: 0.00     Average packs/day: 1 pack/day for 20.0 years (20.0 ttl pk-yrs)     Types: Cigarettes     Start date: 1960     Quit date: 1980     Years since quittin.4     Passive exposure: Never    Smokeless tobacco: Never   Substance and Sexual Activity    Alcohol use: No    Drug use: No    Sexual activity: Not Currently     Partners: Female     Review of Systems  Objective:     Vital Signs (Most Recent):  Temp: 97.9 °F (36.6 °C) (25 1753)  Pulse: 65 (25 1753)  Resp: 18 (25)  BP: (!) 161/71 (25 1753)  SpO2: 98 % (25) Vital Signs (24h Range):  Temp:  [97.9 °F (36.6 °C)-98.2 °F (36.8 °C)] 97.9 °F (36.6 °C)  Pulse:  [64-79] 65  Resp:  [14-20] 18  SpO2:  [98 %-100 %] 98 %  BP: (121-162)/(55-71) 161/71     Weight: 65.8 kg (145 lb)  Body mass index is 23.4 kg/m².     Physical Exam  HENT:      Nose:      Right Nostril: No epistaxis.      Left Nostril: No epistaxis.      Comments: Dry blood present in nostrils bilaterally. No active bleeding  Eyes:      Comments: Pale palpebra   Cardiovascular:      Rate and Rhythm: Normal rate.      Heart sounds: Murmur heard.   Pulmonary:      Breath sounds: Wheezing present.   Neurological:      Mental Status: He is alert.                Significant Labs: All pertinent labs within the past 24 hours have been reviewed.    Significant Imaging: I have reviewed all pertinent imaging results/findings within the past 24 hours.

## 2025-02-25 NOTE — H&P
[facility] - Emergency Dept  Hospital Medicine  History & Physical    Patient Name: Dariel Urbina  MRN: [id number]  Patient Class: OP- Observation  Admission Date: 2/24/2025  Attending Physician: Gina Spencer MD   Primary Care Provider: Devon Langston Jr., MD         Patient information was obtained from patient and ER records.     Subjective:     Principal Problem:Acute blood loss anemia    Chief Complaint:   Chief Complaint   Patient presents with    Epistaxis     Arrived with clamp to both nares from home, on coumadin, nail beds pale        HPI: 84 y.o. male with  paroxysmal afib, mechanical aortic valve replacement on Coumadin, CHF, CKD stage 3, T2DM, HTN, recurrent epistaxis s/p multiple cauterizations who is admitted to hospital medicine for acute blood loss anemia 2/2 to profound epistaxis.     Pt noted to have intermittent nosebleeds since prior cauterization which resolve with pressure and afrin. Usually has 1-3 epistaxis episodes a day that last for 1-2 hours. But this past week they have worsened in severity and have been more difficult to control with pressure and Afrin. Today morning 2 AM, he had a large episode of epistaxis that wouldn't resolve with Afrin and pressure till about 11:30 AM prior to arrival at ED. Denies any trauma to the area or any specific triggering events. He reports this past week he has had cough, sore throat, and congestion. He denies any fatigue, dizziness, chest pain, or abdominal pain.     ED Course: XR chest unremarkable, Hgb 6.7, Hct 22.2 - 1 unit pRBC given    No new subjective & objective note has been filed under this hospital service since the last note was generated.  PE remarkable for wheezes and dry blood present in nostrils. Alert, pale palpebra.   Assessment/Plan:     * Acute blood loss anemia  Anemia is likely due to acute blood loss which was from Epistaxis related to warfarin . Most recent hemoglobin and hematocrit are listed below.  Recent Labs      02/24/25  1237   HGB 6.7*   HCT 22.2*       Plan  - If pt has bleed again, afrin instructions per ENT.   - Monitor serial CBC: Every 12 hours  - Transfuse PRBC if patient becomes hemodynamically unstable, symptomatic or H/H drops below 7/21.  - Patient has received 1 units of PRBCs on 2/24/25  - Patient's anemia is currently improving      Wheezes  Wheezes present on PE    Plan  - Duo-nebs  - monitor O2      History of epistaxis  Pt with chronic nosebleeds. Had multiple cauterizations in the past. ENT consulted. Plan for cauterization Wednesday. Holding anticoagulation.      H/O mechanical aortic valve replacement  Holding warfarin      Type 2 diabetes mellitus with stage 4 chronic kidney disease, without long-term current use of insulin  Creatine stable for now. BMP reviewed- noted Estimated Creatinine Clearance: 20.2 mL/min (A) (based on SCr of 2.5 mg/dL (H)). according to latest data. 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  Monitor UOP and serial BMP and adjust therapy as needed. Renally dose meds. Avoid nephrotoxic medications and procedures.    Plan  - Low dose sliding scale  -Holding allopurinol    Hyperlipidemia associated with type 2 diabetes mellitus    Continue home statin    Chronic anticoagulation  Holding in setting of acute blood loss        VTE Risk Mitigation (From admission, onward)           Ordered     Reason for No Pharmacological VTE Prophylaxis  Once        Question:  Reasons:  Answer:  Active Bleeding    02/24/25 1421     IP VTE HIGH RISK PATIENT  Once         02/24/25 1421     Place sequential compression device  Until discontinued         02/24/25 1421                           On 02/24/2025, patient should be placed in hospital observation services under my care in collaboration with Dr. Spencer.         Puma Arvizu MD  Department of Hospital Medicine  [facility]

## 2025-02-25 NOTE — ASSESSMENT & PLAN NOTE
Anemia is likely due to acute blood loss which was from Epistaxis related to warfarin . Most recent hemoglobin and hematocrit are listed below.  Recent Labs     02/24/25  1237   HGB 6.7*   HCT 22.2*     Plan  - Monitor serial CBC: Every 12 hours  - Transfuse PRBC if patient becomes hemodynamically unstable, symptomatic or H/H drops below 7/21.  - Patient has received 1 units of PRBCs on 2/24/25  - Patient's anemia is currently improving

## 2025-02-25 NOTE — PLAN OF CARE
Inpatient Upgrade Note    Dariel Urbina has warranted treatment spanning two or more midnights of hospital level care for the management of   Acute blood loss anemia. Epistaxis . He continues to require daily labs, further testing/imaging, monitoring of vital signs, and further evaluation by consultants. His condition is also complicated by the following comorbidities:   paroxysmal afib, mechanical aortic valve replacement on Coumadin, CHF, CKD stage 3, T2DM, HTN, recurrent epistaxis s/p multiple cauterizations .

## 2025-02-26 ENCOUNTER — ANTI-COAG VISIT (OUTPATIENT)
Dept: CARDIOLOGY | Facility: CLINIC | Age: 85
End: 2025-02-26
Payer: MEDICARE

## 2025-02-26 DIAGNOSIS — Z95.2 H/O MECHANICAL AORTIC VALVE REPLACEMENT: ICD-10-CM

## 2025-02-26 DIAGNOSIS — Z79.01 CHRONIC ANTICOAGULATION: Primary | ICD-10-CM

## 2025-02-26 PROBLEM — J18.9 RIGHT LOWER LOBE PNEUMONIA: Status: ACTIVE | Noted: 2025-02-24

## 2025-02-26 LAB
ALBUMIN SERPL BCP-MCNC: 2.7 G/DL (ref 3.5–5.2)
ALP SERPL-CCNC: 69 U/L (ref 40–150)
ALT SERPL W/O P-5'-P-CCNC: 11 U/L (ref 10–44)
ANION GAP SERPL CALC-SCNC: 7 MMOL/L (ref 8–16)
AST SERPL-CCNC: 19 U/L (ref 10–40)
BASOPHILS # BLD AUTO: 0.02 K/UL (ref 0–0.2)
BASOPHILS NFR BLD: 0.4 % (ref 0–1.9)
BILIRUB SERPL-MCNC: 0.5 MG/DL (ref 0.1–1)
BLD PROD TYP BPU: NORMAL
BLOOD UNIT EXPIRATION DATE: NORMAL
BLOOD UNIT TYPE CODE: 9500
BLOOD UNIT TYPE: NORMAL
BUN SERPL-MCNC: 39 MG/DL (ref 8–23)
CALCIUM SERPL-MCNC: 9 MG/DL (ref 8.7–10.5)
CHLORIDE SERPL-SCNC: 112 MMOL/L (ref 95–110)
CO2 SERPL-SCNC: 19 MMOL/L (ref 23–29)
CODING SYSTEM: NORMAL
CREAT SERPL-MCNC: 2.4 MG/DL (ref 0.5–1.4)
CROSSMATCH INTERPRETATION: NORMAL
DIFFERENTIAL METHOD BLD: ABNORMAL
DISPENSE STATUS: NORMAL
EOSINOPHIL # BLD AUTO: 0.1 K/UL (ref 0–0.5)
EOSINOPHIL NFR BLD: 2.5 % (ref 0–8)
ERYTHROCYTE [DISTWIDTH] IN BLOOD BY AUTOMATED COUNT: 15 % (ref 11.5–14.5)
EST. GFR  (NO RACE VARIABLE): 26.1 ML/MIN/1.73 M^2
GLUCOSE SERPL-MCNC: 66 MG/DL (ref 70–110)
HCT VFR BLD AUTO: 21.2 % (ref 40–54)
HGB BLD-MCNC: 6.6 G/DL (ref 14–18)
IMM GRANULOCYTES # BLD AUTO: 0.02 K/UL (ref 0–0.04)
IMM GRANULOCYTES NFR BLD AUTO: 0.4 % (ref 0–0.5)
INR PPP: 3.7 (ref 0.8–1.2)
LYMPHOCYTES # BLD AUTO: 0.7 K/UL (ref 1–4.8)
LYMPHOCYTES NFR BLD: 14 % (ref 18–48)
MAGNESIUM SERPL-MCNC: 1.8 MG/DL (ref 1.6–2.6)
MCH RBC QN AUTO: 28.8 PG (ref 27–31)
MCHC RBC AUTO-ENTMCNC: 31.1 G/DL (ref 32–36)
MCV RBC AUTO: 93 FL (ref 82–98)
MONOCYTES # BLD AUTO: 0.6 K/UL (ref 0.3–1)
MONOCYTES NFR BLD: 10.9 % (ref 4–15)
NEUTROPHILS # BLD AUTO: 3.7 K/UL (ref 1.8–7.7)
NEUTROPHILS NFR BLD: 71.8 % (ref 38–73)
NRBC BLD-RTO: 0 /100 WBC
OHS QRS DURATION: 156 MS
OHS QTC CALCULATION: 506 MS
PHOSPHATE SERPL-MCNC: 2.6 MG/DL (ref 2.7–4.5)
PLATELET # BLD AUTO: 142 K/UL (ref 150–450)
PMV BLD AUTO: 11.2 FL (ref 9.2–12.9)
POCT GLUCOSE: 153 MG/DL (ref 70–110)
POCT GLUCOSE: 72 MG/DL (ref 70–110)
POCT GLUCOSE: 77 MG/DL (ref 70–110)
POCT GLUCOSE: 81 MG/DL (ref 70–110)
POCT GLUCOSE: 83 MG/DL (ref 70–110)
POTASSIUM SERPL-SCNC: 4.2 MMOL/L (ref 3.5–5.1)
PROT SERPL-MCNC: 6.1 G/DL (ref 6–8.4)
PROTHROMBIN TIME: 36.8 SEC (ref 9–12.5)
RBC # BLD AUTO: 2.29 M/UL (ref 4.6–6.2)
SODIUM SERPL-SCNC: 138 MMOL/L (ref 136–145)
TRANS ERYTHROCYTES VOL PATIENT: NORMAL ML
WBC # BLD AUTO: 5.14 K/UL (ref 3.9–12.7)

## 2025-02-26 PROCEDURE — 94761 N-INVAS EAR/PLS OXIMETRY MLT: CPT | Mod: HCNC

## 2025-02-26 PROCEDURE — 93010 ELECTROCARDIOGRAM REPORT: CPT | Mod: HCNC,,, | Performed by: INTERNAL MEDICINE

## 2025-02-26 PROCEDURE — 21400001 HC TELEMETRY ROOM: Mod: HCNC

## 2025-02-26 PROCEDURE — 25000003 PHARM REV CODE 250: Mod: HCNC

## 2025-02-26 PROCEDURE — 86920 COMPATIBILITY TEST SPIN: CPT | Mod: HCNC

## 2025-02-26 PROCEDURE — 36430 TRANSFUSION BLD/BLD COMPNT: CPT | Mod: HCNC

## 2025-02-26 PROCEDURE — 93005 ELECTROCARDIOGRAM TRACING: CPT | Mod: HCNC

## 2025-02-26 PROCEDURE — P9021 RED BLOOD CELLS UNIT: HCPCS | Mod: HCNC

## 2025-02-26 PROCEDURE — 85025 COMPLETE CBC W/AUTO DIFF WBC: CPT | Mod: HCNC

## 2025-02-26 PROCEDURE — 84100 ASSAY OF PHOSPHORUS: CPT | Mod: HCNC

## 2025-02-26 PROCEDURE — 25000242 PHARM REV CODE 250 ALT 637 W/ HCPCS: Mod: HCNC

## 2025-02-26 PROCEDURE — 80053 COMPREHEN METABOLIC PANEL: CPT | Mod: HCNC

## 2025-02-26 PROCEDURE — 85610 PROTHROMBIN TIME: CPT | Mod: HCNC

## 2025-02-26 PROCEDURE — 82962 GLUCOSE BLOOD TEST: CPT | Mod: HCNC | Performed by: STUDENT IN AN ORGANIZED HEALTH CARE EDUCATION/TRAINING PROGRAM

## 2025-02-26 PROCEDURE — 94640 AIRWAY INHALATION TREATMENT: CPT | Mod: HCNC

## 2025-02-26 PROCEDURE — 83735 ASSAY OF MAGNESIUM: CPT | Mod: HCNC

## 2025-02-26 PROCEDURE — 63600175 PHARM REV CODE 636 W HCPCS: Mod: HCNC

## 2025-02-26 PROCEDURE — 99499 UNLISTED E&M SERVICE: CPT | Mod: HCNC,,, | Performed by: STUDENT IN AN ORGANIZED HEALTH CARE EDUCATION/TRAINING PROGRAM

## 2025-02-26 PROCEDURE — 11000001 HC ACUTE MED/SURG PRIVATE ROOM: Mod: HCNC

## 2025-02-26 PROCEDURE — 36415 COLL VENOUS BLD VENIPUNCTURE: CPT | Mod: HCNC

## 2025-02-26 RX ORDER — HYDROCODONE BITARTRATE AND ACETAMINOPHEN 500; 5 MG/1; MG/1
TABLET ORAL
Status: DISCONTINUED | OUTPATIENT
Start: 2025-02-26 | End: 2025-02-26

## 2025-02-26 RX ORDER — DOXYCYCLINE HYCLATE 100 MG
100 TABLET ORAL EVERY 12 HOURS
Status: DISCONTINUED | OUTPATIENT
Start: 2025-02-26 | End: 2025-03-07

## 2025-02-26 RX ORDER — CEFTRIAXONE 1 G/1
1 INJECTION, POWDER, FOR SOLUTION INTRAMUSCULAR; INTRAVENOUS
Status: DISCONTINUED | OUTPATIENT
Start: 2025-02-26 | End: 2025-03-05

## 2025-02-26 RX ORDER — CEFTRIAXONE 1 G/1
1 INJECTION, POWDER, FOR SOLUTION INTRAMUSCULAR; INTRAVENOUS
Status: DISCONTINUED | OUTPATIENT
Start: 2025-02-26 | End: 2025-02-26

## 2025-02-26 RX ADMIN — DOXYCYCLINE HYCLATE 100 MG: 100 TABLET, COATED ORAL at 03:02

## 2025-02-26 RX ADMIN — Medication 2 SPRAY: at 06:02

## 2025-02-26 RX ADMIN — TRAZODONE HYDROCHLORIDE 50 MG: 50 TABLET ORAL at 10:02

## 2025-02-26 RX ADMIN — DOXYCYCLINE HYCLATE 100 MG: 100 TABLET, COATED ORAL at 10:02

## 2025-02-26 RX ADMIN — ISOSORBIDE MONONITRATE 60 MG: 60 TABLET, EXTENDED RELEASE ORAL at 10:02

## 2025-02-26 RX ADMIN — IPRATROPIUM BROMIDE AND ALBUTEROL SULFATE 3 ML: 2.5; .5 SOLUTION RESPIRATORY (INHALATION) at 04:02

## 2025-02-26 RX ADMIN — ATORVASTATIN CALCIUM 80 MG: 40 TABLET, FILM COATED ORAL at 08:02

## 2025-02-26 RX ADMIN — IPRATROPIUM BROMIDE AND ALBUTEROL SULFATE 3 ML: 2.5; .5 SOLUTION RESPIRATORY (INHALATION) at 08:02

## 2025-02-26 RX ADMIN — CEFTRIAXONE 1 G: 1 INJECTION, POWDER, FOR SOLUTION INTRAMUSCULAR; INTRAVENOUS at 03:02

## 2025-02-26 RX ADMIN — IPRATROPIUM BROMIDE AND ALBUTEROL SULFATE 3 ML: 2.5; .5 SOLUTION RESPIRATORY (INHALATION) at 12:02

## 2025-02-26 RX ADMIN — IPRATROPIUM BROMIDE AND ALBUTEROL SULFATE 3 ML: 2.5; .5 SOLUTION RESPIRATORY (INHALATION) at 09:02

## 2025-02-26 NOTE — SUBJECTIVE & OBJECTIVE
Interval History: NAEON. AFVSS. Hg dropped this am. Pending OR today.     Medications:  Continuous Infusions:  Scheduled Meds:   albuterol-ipratropium  3 mL Nebulization Q4H WAKE    atorvastatin  80 mg Oral Daily    isosorbide mononitrate  60 mg Oral QHS    traZODone  50 mg Oral QHS     PRN Meds:  Current Facility-Administered Medications:     0.9%  NaCl infusion (for blood administration), , Intravenous, Q24H PRN    0.9%  NaCl infusion (for blood administration), , Intravenous, Q24H PRN    dextrose 50%, 12.5 g, Intravenous, PRN    dextrose 50%, 25 g, Intravenous, PRN    glucagon (human recombinant), 1 mg, Intramuscular, PRN    glucose, 16 g, Oral, PRN    glucose, 24 g, Oral, PRN    insulin aspart U-100, 0-5 Units, Subcutaneous, QID (AC + HS) PRN    naloxone, 0.02 mg, Intravenous, PRN    oxymetazoline, 2 spray, Each Nostril, PRN    sodium chloride 0.9%, 10 mL, Intravenous, Q12H PRN     Review of patient's allergies indicates:   Allergen Reactions    Lisinopril     Losartan      Intolerance- elevates potassium level     Objective:     Vital Signs (24h Range):  Temp:  [97.4 °F (36.3 °C)-98.3 °F (36.8 °C)] 97.4 °F (36.3 °C)  Pulse:  [] 68  Resp:  [16-19] 18  SpO2:  [86 %-98 %] 97 %  BP: (112-137)/(39-64) 125/39       Lines/Drains/Airways       Peripheral Intravenous Line  Duration                  Peripheral IV - Single Lumen 02/24/25 1234 20 G Anterior;Distal;Right Upper Arm 1 day                     Physical Exam  NAD  Resting in bed comfortably   Head atraumatic   EOMI   Auricles WNL AU  Dried crust in b/l nasal cavity, large anterior septal perforation, no active bleeding   No active bleeding in posterior oropharynx   Neck soft  Unlabored breathing, no stridor      Significant Labs:  BMP:   Recent Labs   Lab 02/26/25  0325   GLU 66*   *   CO2 19*   BUN 39*   CREATININE 2.4*   CALCIUM 9.0   MG 1.8     CBC:   Recent Labs   Lab 02/26/25  0325   WBC 5.14   RBC 2.29*   HGB 6.6*   HCT 21.2*   *   MCV  93   [facility] 28.8   [facility] 31.1*       Significant Diagnostics:  I have reviewed and interpreted all pertinent imaging results/findings within the past 24 hours.

## 2025-02-26 NOTE — SUBJECTIVE & OBJECTIVE
Interval History: HgB-6.6. Receiving 1 unit of blood. Scheduled for ENT procedure. Pt with worsening wheezes today. Started on Ceftriaxone/doxy for possible CAP. PT-INR still supra therapeutic despite being off warfarin and hgb trending down.     Review of Systems  Objective:     Vital Signs (Most Recent):  Temp: 98 °F (36.7 °C) (02/26/25 0958)  Pulse: 71 (02/26/25 0958)  Resp: 18 (02/26/25 0958)  BP: (!) 138/43 (02/26/25 0958)  SpO2: 96 % (02/26/25 0958) Vital Signs (24h Range):  Temp:  [97.4 °F (36.3 °C)-98.3 °F (36.8 °C)] 98 °F (36.7 °C)  Pulse:  [] 71  Resp:  [16-19] 18  SpO2:  [86 %-98 %] 96 %  BP: (112-138)/(39-64) 138/43     Weight: 65.8 kg (145 lb)  Body mass index is 23.4 kg/m².    Intake/Output Summary (Last 24 hours) at 2/26/2025 1116  Last data filed at 2/26/2025 0932  Gross per 24 hour   Intake 0 ml   Output --   Net 0 ml         Physical Exam  HENT:      Nose:      Right Nostril: No epistaxis.      Left Nostril: No epistaxis.      Comments: Dry blood present in nostrils bilaterally. No active bleeding  Eyes:      Comments: Pale palpebra   Cardiovascular:      Rate and Rhythm: Normal rate.      Heart sounds: Murmur heard.   Pulmonary:      Breath sounds: Wheezing present.   Neurological:      Mental Status: He is alert.             Significant Labs: All pertinent labs within the past 24 hours have been reviewed.    Significant Imaging: I have reviewed all pertinent imaging results/findings within the past 24 hours.

## 2025-02-26 NOTE — PROGRESS NOTES
[facility]  Otorhinolaryngology-Head & Neck Surgery  Progress Note    Subjective:     Post-Op Info:  Procedure(s) (LRB):  CAUTERIZATION, NOSE, ENDOSCOPIC (N/A)      Hospital Day: 3     Interval History: NAEON. AFVSS. Hg dropped this am. Pending OR today.     Medications:  Continuous Infusions:  Scheduled Meds:   albuterol-ipratropium  3 mL Nebulization Q4H WAKE    atorvastatin  80 mg Oral Daily    isosorbide mononitrate  60 mg Oral QHS    traZODone  50 mg Oral QHS     PRN Meds:  Current Facility-Administered Medications:     0.9%  NaCl infusion (for blood administration), , Intravenous, Q24H PRN    0.9%  NaCl infusion (for blood administration), , Intravenous, Q24H PRN    dextrose 50%, 12.5 g, Intravenous, PRN    dextrose 50%, 25 g, Intravenous, PRN    glucagon (human recombinant), 1 mg, Intramuscular, PRN    glucose, 16 g, Oral, PRN    glucose, 24 g, Oral, PRN    insulin aspart U-100, 0-5 Units, Subcutaneous, QID (AC + HS) PRN    naloxone, 0.02 mg, Intravenous, PRN    oxymetazoline, 2 spray, Each Nostril, PRN    sodium chloride 0.9%, 10 mL, Intravenous, Q12H PRN     Review of patient's allergies indicates:   Allergen Reactions    Lisinopril     Losartan      Intolerance- elevates potassium level     Objective:     Vital Signs (24h Range):  Temp:  [97.4 °F (36.3 °C)-98.3 °F (36.8 °C)] 97.4 °F (36.3 °C)  Pulse:  [] 68  Resp:  [16-19] 18  SpO2:  [86 %-98 %] 97 %  BP: (112-137)/(39-64) 125/39       Lines/Drains/Airways       Peripheral Intravenous Line  Duration                  Peripheral IV - Single Lumen 02/24/25 1234 20 G Anterior;Distal;Right Upper Arm 1 day                     Physical Exam  NAD  Resting in bed comfortably   Head atraumatic   EOMI   Auricles WNL AU  Dried crust in b/l nasal cavity, large anterior septal perforation, no active bleeding   No active bleeding in posterior oropharynx   Neck soft  Unlabored breathing, no stridor      Significant Labs:  BMP:   Recent Labs   Lab  02/26/25  0325   GLU 66*   *   CO2 19*   BUN 39*   CREATININE 2.4*   CALCIUM 9.0   MG 1.8     CBC:   Recent Labs   Lab 02/26/25  0325   WBC 5.14   RBC 2.29*   HGB 6.6*   HCT 21.2*   *   MCV 93   MCH 28.8   MCHC 31.1*       Significant Diagnostics:  I have reviewed and interpreted all pertinent imaging results/findings within the past 24 hours.  Assessment/Plan:     History of epistaxis  84 y.o. with history of recurrent epistaxis, on Coumadin for heart valve, who is s/p bilateral SP ligation and bilateral embolization and multiple cauterizations by ENT. He presents due to epistaxis 2/24/25. He is currently hemostatic in the ER. Hgb was 6.7, s/p 1u pRBC in the ER. Admitted to  for observation    - No packing in place currently - currently hemostatic  - Keep head of bed elevated  - Apply Afrin to affected nasal cavity if epistaxis recurs, please see detailed instructions below   - Plan to OR on TODAY Wednesday 2/26 for nasal endoscopy and control of epistaxis. Will consent later this AM.   - Please call ENT with questions  - Remainder of care per primary team    Aftercare/ moisturizing recommendations to decrease frequency of nosebleeds:  - Daily nasal saline sprays/rinses  - Nightly application of vaseline/aquaphor to anterior nares  - Room humidification  - Avoidance of nasal cannula if possible (always with humidification and please use face tent, nasal cup, facemask if possible)  Management of medical conditions contributing to epistaxis (coagulopathy, hypertension, etc.)  - Humidification of CPAP appliance if applicable    If rebleeding occurs:   - Apply Afrin liberally to both nostrils  - Apply pressure over the soft part of the nose for 15 minutes (clip or digital pressure, important to have pressure over soft part of nose and consistent pressure (do not release pressure at any point))  - Instruct patient to lean forward, avoid swallowing blood. This can cause nausea and vomiting  - Can repeat  these steps above up to 3 times  - Call/reconsult ENT or return to the nearest emergency department if this is unsuccessful          Cooper Ghotra MD  Otorhinolaryngology-Head & Neck Surgery  [facility]

## 2025-02-26 NOTE — MEDICAL/APP STUDENT
[facility]   Progress Note  [facility] [place] [facility] 5    Admit Date: 2/24/2025  Hospital Day: 1  02/26/2025  6:59 AM    SUBJECTIVE:   Mr. Dariel Urbina is a 84 y.o. male with a relevant medical history of paroxysmal afib, mechanical aortic valve replacement on warfarin, CHF, CKD stage 4, T2DM, HTN, and recurrent epistaxis who is being followed up for Acute blood loss anemia 2/2 to epistaxis.    Interval history: 1 unit pRBC given, 2/26 for H/H 6.6 / 21.2 , no nosebleed overnight. Wheeze, SOB, and cough have worsened. Procedure canceled due to abnormal CXR.      Please refer to the H&P for past medical, family, and social history.    OBJECTIVE:     Vital Signs Recent:  Temp: 98.3 °F (36.8 °C) (02/26/25 0424)  Pulse: 71 (02/26/25 0424)  Resp: 16 (02/26/25 0424)  BP: (!) 123/45 (02/26/25 0424)  SpO2: 95 % (02/26/25 0424)  Oxygen Documentation:                Device (Oxygen Therapy): room air         Vital Signs Range (Last 24H):  Temp:  [97.5 °F (36.4 °C)-98.3 °F (36.8 °C)]   Pulse:  []   Resp:  [16-19]   BP: (112-142)/(42-64)   SpO2:  [86 %-98 %]        I & O (Last 24H):No intake or output data in the 24 hours ending 02/26/25 0659     Physical Exam:  Physical Exam  Constitutional:       General: He is not in acute distress.  HENT:      Head: Normocephalic and atraumatic.      Nose: Congestion present.   Cardiovascular:      Rate and Rhythm: Normal rate and regular rhythm.      Heart sounds: Murmur heard.   Pulmonary:      Effort: Pulmonary effort is normal.      Breath sounds: Wheezing present.   Abdominal:      General: Abdomen is flat. Bowel sounds are normal. There is no distension.      Palpations: Abdomen is soft.      Tenderness: There is no abdominal tenderness.   Musculoskeletal:         General: No swelling.   Skin:     General: Skin is warm and dry.   Neurological:      General: No focal deficit present.      Mental Status: He is alert and oriented to person, place, and time.         Labs:    Recent Labs   Lab 02/24/25  1237 02/25/25 0447 02/26/25  0325    139 138   K 3.7 3.6 4.2   * 110 112*   CO2 21* 18* 19*   BUN 47* 43* 39*   CREATININE 2.5* 2.2* 2.4*    67* 66*   CALCIUM 9.6 9.1 9.0   MG  --  1.8 1.8   PHOS  --  2.8 2.6*     Recent Labs   Lab 02/24/25  1237 02/25/25 0447 02/25/25 0902 02/26/25  0325   ALKPHOS 71 69  --  69   ALT 14 12  --  11   AST 27 25  --  19   ALBUMIN 3.0* 2.8*  --  2.7*   PROT 6.9 6.3  --  6.1   BILITOT 0.3 0.9  --  0.5   INR 3.8*  --  3.6* 3.7*     Recent Labs   Lab 02/24/25 2014 02/25/25  0902 02/26/25  0325   WBC 6.05 4.72 5.14   HGB 7.2* 7.0* 6.6*   HCT 24.2* 22.9* 21.2*   * 133* 142*       Diagnostic Results:  Repeat CXR suspicious for developing pneumonia.    Scheduled Meds:   albuterol-ipratropium  3 mL Nebulization Q4H WAKE    atorvastatin  80 mg Oral Daily    isosorbide mononitrate  60 mg Oral QHS    traZODone  50 mg Oral QHS     Continuous Infusions:  PRN Meds:  Current Facility-Administered Medications:     0.9%  NaCl infusion (for blood administration), , Intravenous, Q24H PRN    0.9%  NaCl infusion (for blood administration), , Intravenous, Q24H PRN    dextrose 50%, 12.5 g, Intravenous, PRN    dextrose 50%, 25 g, Intravenous, PRN    glucagon (human recombinant), 1 mg, Intramuscular, PRN    glucose, 16 g, Oral, PRN    glucose, 24 g, Oral, PRN    insulin aspart U-100, 0-5 Units, Subcutaneous, QID (AC + HS) PRN    naloxone, 0.02 mg, Intravenous, PRN    oxymetazoline, 2 spray, Each Nostril, PRN    sodium chloride 0.9%, 10 mL, Intravenous, Q12H PRN    ASSESSMENT/PLAN:   Mr. Dariel Urbina is a 84 y.o. male with a relevant medical history of paroxysmal afib, mechanical aortic valve replacement on warfarin, CHF, CKD stage 4, T2DM, HTN, and recurrent epistaxis who is being followed up for Acute blood loss anemia.    Active Hospital Problems    Diagnosis  POA    *Acute blood loss anemia [D62]  Yes    History of epistaxis [Z87.898]  Not  Applicable    Wheezes [R06.2]  Yes    H/O mechanical aortic valve replacement [Z95.2]  Not Applicable    Type 2 diabetes mellitus with stage 4 chronic kidney disease, without long-term current use of insulin [E11.22, N18.4]  Yes     Chronic    Chronic anticoagulation [Z79.01]  Not Applicable    Hyperlipidemia associated with type 2 diabetes mellitus [E11.69, E78.5]  Yes      Resolved Hospital Problems   No resolved problems to display.       1.Recurrent epistaxis 2/2 to supratherapeutic INR on warfarin  PLAN:  - Cauterization delayed due to abnormal CXR  - Monitor serial CBC every 12 hours  - Transfuse PRBC if patient becomes hemodynamically unstable, symptomatic or H/H drops below 7/21.   - 1 unit pRBC given on 2/26 for H/H 6.6 / 21.2  - Nasal Afrin, pressure, packing PRN      2. New onset wheeze  Likely 2/2 to URI, wheeze worsening, prior CXR unremarkable, repeat CXR with early signs of consolidation  PLAN:  - 1g IV ceftriaxone daily  - EKG ordered, if qTC normal start azithromycin, otherwise will start doxycycline  - duonebs Q4hr  - monitor O2  - will not collect respiratory infection panel as pt is having recurrent epistaxis      3. Anemia  Anemia is likely due to acute blood loss from epistaxis related to warfarin  PLAN:  - CBC every 12 hours    4. H/O mechanical aortic valve replacement  - Holding warfarin        5. Type 2 diabetes mellitus with stage 4 chronic kidney disease, without long-term current use of insulin  Creatine stable for now. BMP reviewed- noted Estimated Creatinine Clearance: 20.2 mL/min (A) (based on SCr of 2.5 mg/dL (H)). according to latest data. 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  Monitor UOP and serial BMP and adjust therapy as needed. Renally dose meds. Avoid nephrotoxic medications and procedures  PLAN:  - Low dose sliding scale  - Holding allopurinol     6. Hyperlipidemia associated with type 2 diabetes mellitus  - Continue home statin     7. Chronic  anticoagulation  - Holding in setting of acute blood loss    Discharge planning:   ASTER: 2/27/2025     Code Status: Full Code   Medical Readiness for Discharge Date

## 2025-02-26 NOTE — PROGRESS NOTES
[facility] Medicine  Progress Note    Patient Name: Dariel Urbina  MRN: [id number]  Patient Class: IP- Inpatient   Admission Date: 2/24/2025  Length of Stay: 1 days  Attending Physician: Gina Spencer MD  Primary Care Provider: Devon Langston Jr., MD        Subjective     Principal Problem:Acute blood loss anemia        HPI:  84 y.o. male with  paroxysmal afib, mechanical aortic valve replacement on Coumadin, CHF, CKD stage 3, T2DM, HTN, recurrent epistaxis s/p multiple cauterizations who is admitted to hospital medicine for acute blood loss anemia 2/2 to profound epistaxis.     Pt noted to have intermittent nosebleeds since prior cauterization which resolve with pressure and afrin. Usually has 1-3 epistaxis episodes a day that last for 1-2 hours. But this past week they have worsened in severity and have been more difficult to control with pressure and Afrin. Today morning 2 AM, he had a large episode of epistaxis that wouldn't resolve with Afrin and pressure till about 11:30 AM prior to arrival at ED. Denies any trauma to the area or any specific triggering events. He reports this past week he has had cough, sore throat, and congestion. He denies any fatigue, dizziness, chest pain, or abdominal pain.     ED Course: XR chest unremarkable, Hgb 6.7, Hct 22.2 - 1 unit pRBC given    Overview/Hospital Course:  84 y.o. male with  paroxysmal afib, mechanical aortic valve replacement on Coumadin, CHF, CKD stage 3, T2DM, HTN, recurrent epistaxis s/p multiple cauterizations who is admitted to hospital medicine for acute blood loss anemia 2/2 to profound epistaxis. On admission, HgB- 6.7, received 1 unit of pRBCs. Repeat HgB s/p transfusion is 7.2. PT-INR goal per coumadin clinic is 2.2.5. Has wheezes on physical exam which is new. RIP panel unremarkable CXR concerning for possible infectious process. ENT planned for cauterization on 2/26, however pt wheezing worsened and it was decided to postpone  surgery. Started on Ceftriaxone/Doxy for CAP.  Monitoring Pt-INR.    Interval History: HgB-6.6. Receiving 1 unit of blood. Scheduled for ENT procedure. Pt with worsening wheezes today. Started on Ceftriaxone/doxy for possible CAP. PT-INR still supra therapeutic despite being off warfarin and hgb trending down.     Review of Systems  Objective:     Vital Signs (Most Recent):  Temp: 98 °F (36.7 °C) (02/26/25 0958)  Pulse: 71 (02/26/25 0958)  Resp: 18 (02/26/25 0958)  BP: (!) 138/43 (02/26/25 0958)  SpO2: 96 % (02/26/25 0958) Vital Signs (24h Range):  Temp:  [97.4 °F (36.3 °C)-98.3 °F (36.8 °C)] 98 °F (36.7 °C)  Pulse:  [] 71  Resp:  [16-19] 18  SpO2:  [86 %-98 %] 96 %  BP: (112-138)/(39-64) 138/43     Weight: 65.8 kg (145 lb)  Body mass index is 23.4 kg/m².    Intake/Output Summary (Last 24 hours) at 2/26/2025 1116  Last data filed at 2/26/2025 0932  Gross per 24 hour   Intake 0 ml   Output --   Net 0 ml         Physical Exam  HENT:      Nose:      Right Nostril: No epistaxis.      Left Nostril: No epistaxis.      Comments: Dry blood present in nostrils bilaterally. No active bleeding  Eyes:      Comments: Pale palpebra   Cardiovascular:      Rate and Rhythm: Normal rate.      Heart sounds: Murmur heard.   Pulmonary:      Breath sounds: Wheezing present.   Neurological:      Mental Status: He is alert.             Significant Labs: All pertinent labs within the past 24 hours have been reviewed.    Significant Imaging: I have reviewed all pertinent imaging results/findings within the past 24 hours.    Assessment and Plan     * Acute blood loss anemia  Anemia is likely due to acute blood loss which was from Epistaxis related to warfarin . Most recent hemoglobin and hematocrit are listed below.  Recent Labs     02/24/25 2014 02/25/25  0902 02/26/25  0325   HGB 7.2* 7.0* 6.6*   HCT 24.2* 22.9* 21.2*       Plan  - If pt has bleed again, afrin instructions per ENT.   - Monitor serial CBC: Every 12 hours  - Transfuse  PRBC if patient becomes hemodynamically unstable, symptomatic or H/H drops below 7/21.        Right lower lobe pneumonia  Wheezes present on PE. Concerning for developing infection, at least post viral pneumonia. Not on O2.    Plan  -Started on ceftriaxone/doxy      History of epistaxis  Pt with chronic nosebleeds. Had multiple cauterizations in the past. ENT consulted. Holding anticoagulation. Cauterization per ENT.      H/O mechanical aortic valve replacement  Holding warfarin      Type 2 diabetes mellitus with stage 4 chronic kidney disease, without long-term current use of insulin  Creatine stable for now. BMP reviewed- noted Estimated Creatinine Clearance: 20.2 mL/min (A) (based on SCr of 2.5 mg/dL (H)). according to latest data. 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  Monitor UOP and serial BMP and adjust therapy as needed. Renally dose meds. Avoid nephrotoxic medications and procedures.    Plan  - Low dose sliding scale  -Holding allopurinol    Hyperlipidemia associated with type 2 diabetes mellitus    Continue home statin    Chronic anticoagulation  Holding in setting of acute blood loss        VTE Risk Mitigation (From admission, onward)           Ordered     Reason for No Pharmacological VTE Prophylaxis  Once        Question:  Reasons:  Answer:  Active Bleeding    02/24/25 1421     IP VTE HIGH RISK PATIENT  Once         02/24/25 1421     Place sequential compression device  Until discontinued         02/24/25 1421                    Discharge Planning   ASTER: 2/27/2025     Code Status: Full Code   Medical Readiness for Discharge Date:   Discharge Plan A: Home with family, Home                        Puma Arvizu MD  Department of Hospital Medicine   [facility]

## 2025-02-26 NOTE — PROGRESS NOTES
Wife called to report Patient is still in the hospital In Patient, advised wife to call coumadin clinic once he is discharged from the hospital

## 2025-02-26 NOTE — ASSESSMENT & PLAN NOTE
84 y.o. with history of recurrent epistaxis, on Coumadin for heart valve, who is s/p bilateral SP ligation and bilateral embolization and multiple cauterizations by ENT. He presents due to epistaxis 2/24/25. He is currently hemostatic in the ER. Hgb was 6.7, s/p 1u pRBC in the ER. Admitted to  for observation    - No packing in place currently - currently hemostatic  - Keep head of bed elevated  - Apply Afrin to affected nasal cavity if epistaxis recurs, please see detailed instructions below   - Plan to OR on TODAY Wednesday 2/26 for nasal endoscopy and control of epistaxis. Will consent later this AM.   - Please call ENT with questions  - Remainder of care per primary team    Aftercare/ moisturizing recommendations to decrease frequency of nosebleeds:  - Daily nasal saline sprays/rinses  - Nightly application of vaseline/aquaphor to anterior nares  - Room humidification  - Avoidance of nasal cannula if possible (always with humidification and please use face tent, nasal cup, facemask if possible)  Management of medical conditions contributing to epistaxis (coagulopathy, hypertension, etc.)  - Humidification of CPAP appliance if applicable    If rebleeding occurs:   - Apply Afrin liberally to both nostrils  - Apply pressure over the soft part of the nose for 15 minutes (clip or digital pressure, important to have pressure over soft part of nose and consistent pressure (do not release pressure at any point))  - Instruct patient to lean forward, avoid swallowing blood. This can cause nausea and vomiting  - Can repeat these steps above up to 3 times  - Call/reconsult ENT or return to the nearest emergency department if this is unsuccessful

## 2025-02-26 NOTE — ASSESSMENT & PLAN NOTE
Wheezes present on PE. Concerning for developing infection, at least post viral pneumonia. Not on O2.    Plan  -Started on ceftriaxone/doxy

## 2025-02-26 NOTE — ASSESSMENT & PLAN NOTE
Anemia is likely due to acute blood loss which was from Epistaxis related to warfarin . Most recent hemoglobin and hematocrit are listed below.  Recent Labs     02/24/25 2014 02/25/25  0902 02/26/25  0325   HGB 7.2* 7.0* 6.6*   HCT 24.2* 22.9* 21.2*       Plan  - If pt has bleed again, afrin instructions per ENT.   - Monitor serial CBC: Every 12 hours  - Transfuse PRBC if patient becomes hemodynamically unstable, symptomatic or H/H drops below 7/21.

## 2025-02-27 ENCOUNTER — OUTPATIENT CASE MANAGEMENT (OUTPATIENT)
Dept: ADMINISTRATIVE | Facility: OTHER | Age: 85
End: 2025-02-27
Payer: MEDICARE

## 2025-02-27 LAB
ALBUMIN SERPL BCP-MCNC: 2.9 G/DL (ref 3.5–5.2)
ALP SERPL-CCNC: 77 U/L (ref 40–150)
ALT SERPL W/O P-5'-P-CCNC: 9 U/L (ref 10–44)
ANION GAP SERPL CALC-SCNC: 9 MMOL/L (ref 8–16)
AST SERPL-CCNC: 23 U/L (ref 10–40)
BASOPHILS # BLD AUTO: 0.02 K/UL (ref 0–0.2)
BASOPHILS NFR BLD: 0.4 % (ref 0–1.9)
BILIRUB SERPL-MCNC: 0.5 MG/DL (ref 0.1–1)
BUN SERPL-MCNC: 32 MG/DL (ref 8–23)
CALCIUM SERPL-MCNC: 9.4 MG/DL (ref 8.7–10.5)
CHLORIDE SERPL-SCNC: 113 MMOL/L (ref 95–110)
CO2 SERPL-SCNC: 19 MMOL/L (ref 23–29)
CREAT SERPL-MCNC: 2.4 MG/DL (ref 0.5–1.4)
DIFFERENTIAL METHOD BLD: ABNORMAL
EOSINOPHIL # BLD AUTO: 0.1 K/UL (ref 0–0.5)
EOSINOPHIL NFR BLD: 2.7 % (ref 0–8)
ERYTHROCYTE [DISTWIDTH] IN BLOOD BY AUTOMATED COUNT: 15.3 % (ref 11.5–14.5)
EST. GFR  (NO RACE VARIABLE): 26.1 ML/MIN/1.73 M^2
GLUCOSE SERPL-MCNC: 79 MG/DL (ref 70–110)
HCT VFR BLD AUTO: 25 % (ref 40–54)
HGB BLD-MCNC: 7.6 G/DL (ref 14–18)
IMM GRANULOCYTES # BLD AUTO: 0.02 K/UL (ref 0–0.04)
IMM GRANULOCYTES NFR BLD AUTO: 0.4 % (ref 0–0.5)
INR PPP: 3.2 (ref 0.8–1.2)
LYMPHOCYTES # BLD AUTO: 0.8 K/UL (ref 1–4.8)
LYMPHOCYTES NFR BLD: 17.4 % (ref 18–48)
MAGNESIUM SERPL-MCNC: 2 MG/DL (ref 1.6–2.6)
MCH RBC QN AUTO: 28 PG (ref 27–31)
MCHC RBC AUTO-ENTMCNC: 30.4 G/DL (ref 32–36)
MCV RBC AUTO: 92 FL (ref 82–98)
MONOCYTES # BLD AUTO: 0.5 K/UL (ref 0.3–1)
MONOCYTES NFR BLD: 11.9 % (ref 4–15)
NEUTROPHILS # BLD AUTO: 3 K/UL (ref 1.8–7.7)
NEUTROPHILS NFR BLD: 67.2 % (ref 38–73)
NRBC BLD-RTO: 0 /100 WBC
PHOSPHATE SERPL-MCNC: 2.6 MG/DL (ref 2.7–4.5)
PLATELET # BLD AUTO: 146 K/UL (ref 150–450)
PMV BLD AUTO: 10 FL (ref 9.2–12.9)
POCT GLUCOSE: 110 MG/DL (ref 70–110)
POCT GLUCOSE: 131 MG/DL (ref 70–110)
POCT GLUCOSE: 143 MG/DL (ref 70–110)
POCT GLUCOSE: 77 MG/DL (ref 70–110)
POTASSIUM SERPL-SCNC: 3.8 MMOL/L (ref 3.5–5.1)
PROT SERPL-MCNC: 6.5 G/DL (ref 6–8.4)
PROTHROMBIN TIME: 31.9 SEC (ref 9–12.5)
RBC # BLD AUTO: 2.71 M/UL (ref 4.6–6.2)
SODIUM SERPL-SCNC: 141 MMOL/L (ref 136–145)
WBC # BLD AUTO: 4.47 K/UL (ref 3.9–12.7)

## 2025-02-27 PROCEDURE — 80053 COMPREHEN METABOLIC PANEL: CPT | Mod: HCNC

## 2025-02-27 PROCEDURE — 11000001 HC ACUTE MED/SURG PRIVATE ROOM: Mod: HCNC

## 2025-02-27 PROCEDURE — 63600175 PHARM REV CODE 636 W HCPCS: Mod: HCNC

## 2025-02-27 PROCEDURE — 85025 COMPLETE CBC W/AUTO DIFF WBC: CPT | Mod: HCNC

## 2025-02-27 PROCEDURE — 94799 UNLISTED PULMONARY SVC/PX: CPT | Mod: HCNC

## 2025-02-27 PROCEDURE — 36415 COLL VENOUS BLD VENIPUNCTURE: CPT | Mod: HCNC

## 2025-02-27 PROCEDURE — 25000242 PHARM REV CODE 250 ALT 637 W/ HCPCS: Mod: HCNC

## 2025-02-27 PROCEDURE — 84100 ASSAY OF PHOSPHORUS: CPT | Mod: HCNC

## 2025-02-27 PROCEDURE — 83735 ASSAY OF MAGNESIUM: CPT | Mod: HCNC

## 2025-02-27 PROCEDURE — 94640 AIRWAY INHALATION TREATMENT: CPT | Mod: HCNC

## 2025-02-27 PROCEDURE — 99900035 HC TECH TIME PER 15 MIN (STAT): Mod: HCNC

## 2025-02-27 PROCEDURE — 94761 N-INVAS EAR/PLS OXIMETRY MLT: CPT | Mod: HCNC

## 2025-02-27 PROCEDURE — 85610 PROTHROMBIN TIME: CPT | Mod: HCNC

## 2025-02-27 PROCEDURE — 25000003 PHARM REV CODE 250: Mod: HCNC

## 2025-02-27 RX ORDER — IPRATROPIUM BROMIDE AND ALBUTEROL SULFATE 2.5; .5 MG/3ML; MG/3ML
3 SOLUTION RESPIRATORY (INHALATION)
Status: DISCONTINUED | OUTPATIENT
Start: 2025-02-27 | End: 2025-02-27

## 2025-02-27 RX ORDER — IPRATROPIUM BROMIDE AND ALBUTEROL SULFATE 2.5; .5 MG/3ML; MG/3ML
3 SOLUTION RESPIRATORY (INHALATION)
Status: DISCONTINUED | OUTPATIENT
Start: 2025-02-27 | End: 2025-03-01

## 2025-02-27 RX ADMIN — IPRATROPIUM BROMIDE AND ALBUTEROL SULFATE 3 ML: 2.5; .5 SOLUTION RESPIRATORY (INHALATION) at 01:02

## 2025-02-27 RX ADMIN — ISOSORBIDE MONONITRATE 60 MG: 60 TABLET, EXTENDED RELEASE ORAL at 09:02

## 2025-02-27 RX ADMIN — DOXYCYCLINE HYCLATE 100 MG: 100 TABLET, COATED ORAL at 09:02

## 2025-02-27 RX ADMIN — TRAZODONE HYDROCHLORIDE 50 MG: 50 TABLET ORAL at 09:02

## 2025-02-27 RX ADMIN — ATORVASTATIN CALCIUM 80 MG: 40 TABLET, FILM COATED ORAL at 09:02

## 2025-02-27 RX ADMIN — CEFTRIAXONE 1 G: 1 INJECTION, POWDER, FOR SOLUTION INTRAMUSCULAR; INTRAVENOUS at 11:02

## 2025-02-27 RX ADMIN — IPRATROPIUM BROMIDE AND ALBUTEROL SULFATE 3 ML: 2.5; .5 SOLUTION RESPIRATORY (INHALATION) at 04:02

## 2025-02-27 RX ADMIN — IPRATROPIUM BROMIDE AND ALBUTEROL SULFATE 3 ML: 2.5; .5 SOLUTION RESPIRATORY (INHALATION) at 07:02

## 2025-02-27 NOTE — SUBJECTIVE & OBJECTIVE
Interval History: No issues overnight. Hemostatic on exam this morning.     Nasal endoscopy deferred due to concerns for developing RLL infiltrate consistent with pneumonia.     Medications:  Continuous Infusions:  Scheduled Meds:   albuterol-ipratropium  3 mL Nebulization Q8H WA    atorvastatin  80 mg Oral Daily    cefTRIAXone (Rocephin) IV (PEDS and ADULTS)  1 g Intravenous Q24H    doxycycline  100 mg Oral Q12H    isosorbide mononitrate  60 mg Oral QHS    traZODone  50 mg Oral QHS     PRN Meds:  Current Facility-Administered Medications:     dextrose 50%, 12.5 g, Intravenous, PRN    dextrose 50%, 25 g, Intravenous, PRN    glucagon (human recombinant), 1 mg, Intramuscular, PRN    glucose, 16 g, Oral, PRN    glucose, 24 g, Oral, PRN    insulin aspart U-100, 0-5 Units, Subcutaneous, QID (AC + HS) PRN    naloxone, 0.02 mg, Intravenous, PRN    oxymetazoline, 2 spray, Each Nostril, PRN    sodium chloride 0.9%, 10 mL, Intravenous, Q12H PRN     Review of patient's allergies indicates:   Allergen Reactions    Lisinopril     Losartan      Intolerance- elevates potassium level     Objective:     Vital Signs (24h Range):  Temp:  [97.5 °F (36.4 °C)-98.3 °F (36.8 °C)] 97.6 °F (36.4 °C)  Pulse:  [59-79] 64  Resp:  [17-18] 18  SpO2:  [93 %-98 %] 94 %  BP: (112-178)/(50-64) 123/51       Lines/Drains/Airways       Peripheral Intravenous Line  Duration                  Peripheral IV - Single Lumen 02/24/25 1234 20 G Anterior;Distal;Right Upper Arm 2 days         Peripheral IV - Single Lumen 02/26/25 1126 22 G Anterior;Left Hand <1 day                     Physical Exam  NAD  Resting in bed comfortably   Head atraumatic   EOMI   Auricles WNL AU  Dried crust in b/l nasal cavity, large anterior septal perforation, no active bleeding   No active bleeding in posterior oropharynx, some dried blood in posterior oropharynx  Neck soft  Unlabored breathing, no stridor      Significant Labs:  CBC:   Recent Labs   Lab 02/27/25  0216   WBC 4.47    RBC 2.71*   HGB 7.6*   HCT 25.0*   *   MCV 92   MCH 28.0   MCHC 30.4*     CMP:   Recent Labs   Lab 02/27/25  0216   GLU 79   CALCIUM 9.4   ALBUMIN 2.9*   PROT 6.5      K 3.8   CO2 19*   *   BUN 32*   CREATININE 2.4*   ALKPHOS 77   ALT 9*   AST 23   BILITOT 0.5       Significant Diagnostics:  X-Ray: I have reviewed all pertinent results/findings within the past 24 hours and my personal findings are:  RLL infiltrate

## 2025-02-27 NOTE — ASSESSMENT & PLAN NOTE
Anemia is likely due to acute blood loss which was from Epistaxis related to warfarin . Most recent hemoglobin and hematocrit are listed below.  Recent Labs     02/25/25  0902 02/26/25  0325 02/27/25  0216   HGB 7.0* 6.6* 7.6*   HCT 22.9* 21.2* 25.0*       Plan  - If pt has bleed again, afrin instructions per ENT.   - Monitor serial CBC: Every 12 hours  - Transfuse PRBC if patient becomes hemodynamically unstable, symptomatic or H/H drops below 7/21.

## 2025-02-27 NOTE — PROGRESS NOTES
[facility]  Otorhinolaryngology-Head & Neck Surgery  Progress Note    Subjective:     Post-Op Info:  Procedure(s) (LRB):  CAUTERIZATION, NOSE, ENDOSCOPIC (N/A)   1 Day Post-Op  Hospital Day: 4     Interval History: No issues overnight. Hemostatic on exam this morning.     Nasal endoscopy deferred due to concerns for developing RLL infiltrate consistent with pneumonia.     Medications:  Continuous Infusions:  Scheduled Meds:   albuterol-ipratropium  3 mL Nebulization Q8H WA    atorvastatin  80 mg Oral Daily    cefTRIAXone (Rocephin) IV (PEDS and ADULTS)  1 g Intravenous Q24H    doxycycline  100 mg Oral Q12H    isosorbide mononitrate  60 mg Oral QHS    traZODone  50 mg Oral QHS     PRN Meds:  Current Facility-Administered Medications:     dextrose 50%, 12.5 g, Intravenous, PRN    dextrose 50%, 25 g, Intravenous, PRN    glucagon (human recombinant), 1 mg, Intramuscular, PRN    glucose, 16 g, Oral, PRN    glucose, 24 g, Oral, PRN    insulin aspart U-100, 0-5 Units, Subcutaneous, QID (AC + HS) PRN    naloxone, 0.02 mg, Intravenous, PRN    oxymetazoline, 2 spray, Each Nostril, PRN    sodium chloride 0.9%, 10 mL, Intravenous, Q12H PRN     Review of patient's allergies indicates:   Allergen Reactions    Lisinopril     Losartan      Intolerance- elevates potassium level     Objective:     Vital Signs (24h Range):  Temp:  [97.5 °F (36.4 °C)-98.3 °F (36.8 °C)] 97.6 °F (36.4 °C)  Pulse:  [59-79] 64  Resp:  [17-18] 18  SpO2:  [93 %-98 %] 94 %  BP: (112-178)/(50-64) 123/51       Lines/Drains/Airways       Peripheral Intravenous Line  Duration                  Peripheral IV - Single Lumen 02/24/25 1234 20 G Anterior;Distal;Right Upper Arm 2 days         Peripheral IV - Single Lumen 02/26/25 1126 22 G Anterior;Left Hand <1 day                     Physical Exam  NAD  Resting in bed comfortably   Head atraumatic   EOMI   Auricles WNL AU  Dried crust in b/l nasal cavity, large anterior septal perforation, no active bleeding    No active bleeding in posterior oropharynx, some dried blood in posterior oropharynx  Neck soft  Unlabored breathing, no stridor      Significant Labs:  CBC:   Recent Labs   Lab 02/27/25  0216   WBC 4.47   RBC 2.71*   HGB 7.6*   HCT 25.0*   *   MCV 92   MCH 28.0   MCHC 30.4*     CMP:   Recent Labs   Lab 02/27/25  0216   GLU 79   CALCIUM 9.4   ALBUMIN 2.9*   PROT 6.5      K 3.8   CO2 19*   *   BUN 32*   CREATININE 2.4*   ALKPHOS 77   ALT 9*   AST 23   BILITOT 0.5       Significant Diagnostics:  X-Ray: I have reviewed all pertinent results/findings within the past 24 hours and my personal findings are:  RLL infiltrate  Assessment/Plan:     History of epistaxis  84 y.o. with history of recurrent epistaxis, on Coumadin for heart valve, who is s/p bilateral SP ligation and bilateral embolization and multiple cauterizations by ENT. He presents due to epistaxis 2/24/25. He is currently hemostatic in the ER. Hgb was 6.7, s/p 1u pRBC in the ER. Admitted to  for observation    - Patient needs to remain inpatient until INR normalizes and not requiring transfusions. Patient does not need to remain inpatient only for surgical intervention. Will coordinate surgery as an outpatient or inpatient depending on patient's status  - No packing in place currently - currently hemostatic  - Keep head of bed elevated  - Apply Afrin to affected nasal cavity if epistaxis recurs, please see detailed instructions below   - Please call ENT with questions  - Remainder of care per primary team    Aftercare/ moisturizing recommendations to decrease frequency of nosebleeds:  - Daily nasal saline sprays/rinses  - Nightly application of vaseline/aquaphor to anterior nares  - Room humidification  - Avoidance of nasal cannula if possible (always with humidification and please use face tent, nasal cup, facemask if possible)  Management of medical conditions contributing to epistaxis (coagulopathy, hypertension, etc.)  -  Humidification of CPAP appliance if applicable    If rebleeding occurs:   - Apply Afrin liberally to both nostrils  - Apply pressure over the soft part of the nose for 15 minutes (clip or digital pressure, important to have pressure over soft part of nose and consistent pressure (do not release pressure at any point))  - Instruct patient to lean forward, avoid swallowing blood. This can cause nausea and vomiting  - Can repeat these steps above up to 3 times  - Call/reconsult ENT or return to the nearest emergency department if this is unsuccessful          Johnna Rodriguez MD  Otorhinolaryngology-Head & Neck Surgery  [facility]

## 2025-02-27 NOTE — ASSESSMENT & PLAN NOTE
Pt with chronic nosebleeds. Had multiple cauterizations in the past. ENT consulted. Holding anticoagulation.     - ENT consulted, appreciate recs  - Cauterization with ENT post-poned on 2/26 d/t supratherapeutic INR and developing PNA   - Likely outpatient cauterization with ENT

## 2025-02-27 NOTE — SUBJECTIVE & OBJECTIVE
Interval History: NAEO. Hemoglobin stable. INR is improving, almost normalized. SOB has improved. Will continue IV abx for CAP coverage.      Review of Systems  Objective:     Vital Signs (Most Recent):  Temp: 98.2 °F (36.8 °C) (02/27/25 1151)  Pulse: 70 (02/27/25 1301)  Resp: 18 (02/27/25 1301)  BP: (!) 130/48 (02/27/25 1151)  SpO2: 98 % (02/27/25 1301) Vital Signs (24h Range):  Temp:  [97.6 °F (36.4 °C)-98.3 °F (36.8 °C)] 98.2 °F (36.8 °C)  Pulse:  [58-72] 70  Resp:  [17-18] 18  SpO2:  [93 %-98 %] 98 %  BP: (112-143)/(48-64) 130/48     Weight: 65.8 kg (145 lb)  Body mass index is 23.4 kg/m².  No intake or output data in the 24 hours ending 02/27/25 1550        Physical Exam  HENT:      Nose:      Right Nostril: No epistaxis.      Left Nostril: No epistaxis.      Comments: Dry blood present in nostrils bilaterally. No active bleeding  Eyes:      Comments: Pale palpebra   Cardiovascular:      Rate and Rhythm: Normal rate.      Heart sounds: Murmur heard.   Pulmonary:      Breath sounds: Rhonchi present.   Neurological:      Mental Status: He is alert and oriented to person, place, and time. Mental status is at baseline.      Cranial Nerves: No cranial nerve deficit.             Significant Labs: All pertinent labs within the past 24 hours have been reviewed.    Significant Imaging: I have reviewed all pertinent imaging results/findings within the past 24 hours.

## 2025-02-27 NOTE — ASSESSMENT & PLAN NOTE
84 y.o. with history of recurrent epistaxis, on Coumadin for heart valve, who is s/p bilateral SP ligation and bilateral embolization and multiple cauterizations by ENT. He presents due to epistaxis 2/24/25. He is currently hemostatic in the ER. Hgb was 6.7, s/p 1u pRBC in the ER. Admitted to  for observation    - Patient needs to remain inpatient until INR normalizes and not requiring transfusions. Patient does not need to remain inpatient only for surgical intervention. Will coordinate surgery as an outpatient or inpatient depending on patient's status  - No packing in place currently - currently hemostatic  - Keep head of bed elevated  - Apply Afrin to affected nasal cavity if epistaxis recurs, please see detailed instructions below   - Please call ENT with questions  - Remainder of care per primary team    Aftercare/ moisturizing recommendations to decrease frequency of nosebleeds:  - Daily nasal saline sprays/rinses  - Nightly application of vaseline/aquaphor to anterior nares  - Room humidification  - Avoidance of nasal cannula if possible (always with humidification and please use face tent, nasal cup, facemask if possible)  Management of medical conditions contributing to epistaxis (coagulopathy, hypertension, etc.)  - Humidification of CPAP appliance if applicable    If rebleeding occurs:   - Apply Afrin liberally to both nostrils  - Apply pressure over the soft part of the nose for 15 minutes (clip or digital pressure, important to have pressure over soft part of nose and consistent pressure (do not release pressure at any point))  - Instruct patient to lean forward, avoid swallowing blood. This can cause nausea and vomiting  - Can repeat these steps above up to 3 times  - Call/reconsult ENT or return to the nearest emergency department if this is unsuccessful

## 2025-02-27 NOTE — PROGRESS NOTES
[facility] Medicine  Progress Note    Patient Name: Dariel Urbina  MRN: [id number]  Patient Class: IP- Inpatient   Admission Date: 2/24/2025  Length of Stay: 2 days  Attending Physician: Gina Spencer MD  Primary Care Provider: Devon Langston Jr., MD        Subjective     Principal Problem:Acute blood loss anemia        HPI:  84 y.o. male with  paroxysmal afib, mechanical aortic valve replacement on Coumadin, CHF, CKD stage 3, T2DM, HTN, recurrent epistaxis s/p multiple cauterizations who is admitted to hospital medicine for acute blood loss anemia 2/2 to profound epistaxis.     Pt noted to have intermittent nosebleeds since prior cauterization which resolve with pressure and afrin. Usually has 1-3 epistaxis episodes a day that last for 1-2 hours. But this past week they have worsened in severity and have been more difficult to control with pressure and Afrin. Today morning 2 AM, he had a large episode of epistaxis that wouldn't resolve with Afrin and pressure till about 11:30 AM prior to arrival at ED. Denies any trauma to the area or any specific triggering events. He reports this past week he has had cough, sore throat, and congestion. He denies any fatigue, dizziness, chest pain, or abdominal pain.     ED Course: XR chest unremarkable, Hgb 6.7, Hct 22.2 - 1 unit pRBC given    Overview/Hospital Course:  84 y.o. male with  paroxysmal afib, mechanical aortic valve replacement on Coumadin, CHF, CKD stage 3, T2DM, HTN, recurrent epistaxis s/p multiple cauterizations who is admitted to hospital medicine for acute blood loss anemia 2/2 to profound epistaxis. On admission, HgB- 6.7, received 1 unit of pRBCs. Repeat HgB s/p transfusion is 7.2. PT-INR goal per coumadin clinic is 2.2.5. Has wheezes on physical exam which is new. RIP panel unremarkable CXR concerning for possible infectious process. ENT planned for cauterization on 2/26, however pt wheezing worsened and it was decided to postpone  surgery. Started on Ceftriaxone/Doxy for CAP.  Monitoring Pt-INR.    Interval History: NAEO. Hemoglobin stable. INR is improving, almost normalized. SOB has improved. Will continue IV abx for CAP coverage.      Review of Systems  Objective:     Vital Signs (Most Recent):  Temp: 98.2 °F (36.8 °C) (02/27/25 1151)  Pulse: 70 (02/27/25 1301)  Resp: 18 (02/27/25 1301)  BP: (!) 130/48 (02/27/25 1151)  SpO2: 98 % (02/27/25 1301) Vital Signs (24h Range):  Temp:  [97.6 °F (36.4 °C)-98.3 °F (36.8 °C)] 98.2 °F (36.8 °C)  Pulse:  [58-72] 70  Resp:  [17-18] 18  SpO2:  [93 %-98 %] 98 %  BP: (112-143)/(48-64) 130/48     Weight: 65.8 kg (145 lb)  Body mass index is 23.4 kg/m².  No intake or output data in the 24 hours ending 02/27/25 1550        Physical Exam  HENT:      Nose:      Right Nostril: No epistaxis.      Left Nostril: No epistaxis.      Comments: Dry blood present in nostrils bilaterally. No active bleeding  Eyes:      Comments: Pale palpebra   Cardiovascular:      Rate and Rhythm: Normal rate.      Heart sounds: Murmur heard.   Pulmonary:      Breath sounds: Rhonchi present.   Neurological:      Mental Status: He is alert and oriented to person, place, and time. Mental status is at baseline.      Cranial Nerves: No cranial nerve deficit.             Significant Labs: All pertinent labs within the past 24 hours have been reviewed.    Significant Imaging: I have reviewed all pertinent imaging results/findings within the past 24 hours.    Assessment and Plan     * Acute blood loss anemia  Anemia is likely due to acute blood loss which was from Epistaxis related to warfarin . Most recent hemoglobin and hematocrit are listed below.  Recent Labs     02/25/25  0902 02/26/25  0325 02/27/25  0216   HGB 7.0* 6.6* 7.6*   HCT 22.9* 21.2* 25.0*       Plan  - If pt has bleed again, afrin instructions per ENT.   - Monitor serial CBC: Every 12 hours  - Transfuse PRBC if patient becomes hemodynamically unstable, symptomatic or H/H drops  below 7/21.        Right lower lobe pneumonia  Wheezes present on PE. Concerning for developing infection, at least post viral pneumonia. Not on O2.    Plan  -Started on ceftriaxone/doxy      History of epistaxis  Pt with chronic nosebleeds. Had multiple cauterizations in the past. ENT consulted. Holding anticoagulation.     - ENT consulted, appreciate recs  - Cauterization with ENT post-poned on 2/26 d/t supratherapeutic INR and developing PNA   - Likely outpatient cauterization with ENT       H/O mechanical aortic valve replacement  Holding warfarin      Type 2 diabetes mellitus with stage 4 chronic kidney disease, without long-term current use of insulin  Creatine stable for now. BMP reviewed- noted Estimated Creatinine Clearance: 20.2 mL/min (A) (based on SCr of 2.5 mg/dL (H)). according to latest data. 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  Monitor UOP and serial BMP and adjust therapy as needed. Renally dose meds. Avoid nephrotoxic medications and procedures.    Plan  - Low dose sliding scale  -Holding allopurinol    Hyperlipidemia associated with type 2 diabetes mellitus    Continue home statin    Chronic anticoagulation  Holding in setting of acute blood loss        VTE Risk Mitigation (From admission, onward)           Ordered     Reason for No Pharmacological VTE Prophylaxis  Once        Question:  Reasons:  Answer:  Active Bleeding    02/24/25 1421     IP VTE HIGH RISK PATIENT  Once         02/24/25 1421     Place sequential compression device  Until discontinued         02/24/25 1421                    Discharge Planning   ASTER: 3/1/2025     Code Status: Full Code   Medical Readiness for Discharge Date:   Discharge Plan A: Home with family, Home                        Sushma Diggs MD  Department of Hospital Medicine   [facility]

## 2025-02-28 LAB
ALBUMIN SERPL BCP-MCNC: 2.9 G/DL (ref 3.5–5.2)
ALP SERPL-CCNC: 90 U/L (ref 40–150)
ALT SERPL W/O P-5'-P-CCNC: 9 U/L (ref 10–44)
ANION GAP SERPL CALC-SCNC: 10 MMOL/L (ref 8–16)
AST SERPL-CCNC: 23 U/L (ref 10–40)
BASOPHILS # BLD AUTO: 0.02 K/UL (ref 0–0.2)
BASOPHILS NFR BLD: 0.5 % (ref 0–1.9)
BILIRUB SERPL-MCNC: 0.4 MG/DL (ref 0.1–1)
BUN SERPL-MCNC: 30 MG/DL (ref 8–23)
CALCIUM SERPL-MCNC: 9.5 MG/DL (ref 8.7–10.5)
CHLORIDE SERPL-SCNC: 111 MMOL/L (ref 95–110)
CO2 SERPL-SCNC: 19 MMOL/L (ref 23–29)
CREAT SERPL-MCNC: 2.2 MG/DL (ref 0.5–1.4)
DIFFERENTIAL METHOD BLD: ABNORMAL
EOSINOPHIL # BLD AUTO: 0.2 K/UL (ref 0–0.5)
EOSINOPHIL NFR BLD: 3.9 % (ref 0–8)
ERYTHROCYTE [DISTWIDTH] IN BLOOD BY AUTOMATED COUNT: 15.3 % (ref 11.5–14.5)
EST. GFR  (NO RACE VARIABLE): 29 ML/MIN/1.73 M^2
GLUCOSE SERPL-MCNC: 96 MG/DL (ref 70–110)
HCT VFR BLD AUTO: 27.6 % (ref 40–54)
HGB BLD-MCNC: 8.2 G/DL (ref 14–18)
IMM GRANULOCYTES # BLD AUTO: 0.02 K/UL (ref 0–0.04)
IMM GRANULOCYTES NFR BLD AUTO: 0.5 % (ref 0–0.5)
INR PPP: 2.3 (ref 0.8–1.2)
LYMPHOCYTES # BLD AUTO: 0.8 K/UL (ref 1–4.8)
LYMPHOCYTES NFR BLD: 18.8 % (ref 18–48)
MAGNESIUM SERPL-MCNC: 1.9 MG/DL (ref 1.6–2.6)
MCH RBC QN AUTO: 28.3 PG (ref 27–31)
MCHC RBC AUTO-ENTMCNC: 29.7 G/DL (ref 32–36)
MCV RBC AUTO: 95 FL (ref 82–98)
MONOCYTES # BLD AUTO: 0.6 K/UL (ref 0.3–1)
MONOCYTES NFR BLD: 13.5 % (ref 4–15)
NEUTROPHILS # BLD AUTO: 2.7 K/UL (ref 1.8–7.7)
NEUTROPHILS NFR BLD: 62.8 % (ref 38–73)
NRBC BLD-RTO: 0 /100 WBC
PHOSPHATE SERPL-MCNC: 2.6 MG/DL (ref 2.7–4.5)
PLATELET # BLD AUTO: 171 K/UL (ref 150–450)
PMV BLD AUTO: 10.2 FL (ref 9.2–12.9)
POCT GLUCOSE: 124 MG/DL (ref 70–110)
POCT GLUCOSE: 83 MG/DL (ref 70–110)
POCT GLUCOSE: 84 MG/DL (ref 70–110)
POCT GLUCOSE: 91 MG/DL (ref 70–110)
POTASSIUM SERPL-SCNC: 4.1 MMOL/L (ref 3.5–5.1)
PROT SERPL-MCNC: 6.8 G/DL (ref 6–8.4)
PROTHROMBIN TIME: 23.5 SEC (ref 9–12.5)
RBC # BLD AUTO: 2.9 M/UL (ref 4.6–6.2)
SODIUM SERPL-SCNC: 140 MMOL/L (ref 136–145)
WBC # BLD AUTO: 4.31 K/UL (ref 3.9–12.7)

## 2025-02-28 PROCEDURE — 94640 AIRWAY INHALATION TREATMENT: CPT | Mod: HCNC

## 2025-02-28 PROCEDURE — 25000242 PHARM REV CODE 250 ALT 637 W/ HCPCS: Mod: HCNC

## 2025-02-28 PROCEDURE — 63600175 PHARM REV CODE 636 W HCPCS: Mod: HCNC

## 2025-02-28 PROCEDURE — 94761 N-INVAS EAR/PLS OXIMETRY MLT: CPT | Mod: HCNC

## 2025-02-28 PROCEDURE — 25000003 PHARM REV CODE 250: Mod: HCNC

## 2025-02-28 PROCEDURE — 85025 COMPLETE CBC W/AUTO DIFF WBC: CPT | Mod: HCNC

## 2025-02-28 PROCEDURE — 84100 ASSAY OF PHOSPHORUS: CPT | Mod: HCNC

## 2025-02-28 PROCEDURE — 36415 COLL VENOUS BLD VENIPUNCTURE: CPT | Mod: HCNC

## 2025-02-28 PROCEDURE — 80053 COMPREHEN METABOLIC PANEL: CPT | Mod: HCNC

## 2025-02-28 PROCEDURE — 85610 PROTHROMBIN TIME: CPT | Mod: HCNC

## 2025-02-28 PROCEDURE — 99900035 HC TECH TIME PER 15 MIN (STAT): Mod: HCNC

## 2025-02-28 PROCEDURE — 11000001 HC ACUTE MED/SURG PRIVATE ROOM: Mod: HCNC

## 2025-02-28 PROCEDURE — 83735 ASSAY OF MAGNESIUM: CPT | Mod: HCNC

## 2025-02-28 RX ORDER — WARFARIN 2.5 MG/1
2.5 TABLET ORAL DAILY
Status: DISCONTINUED | OUTPATIENT
Start: 2025-02-28 | End: 2025-02-28

## 2025-02-28 RX ORDER — WARFARIN 2.5 MG/1
2.5 TABLET ORAL DAILY
Status: DISCONTINUED | OUTPATIENT
Start: 2025-03-01 | End: 2025-03-01

## 2025-02-28 RX ORDER — SODIUM,POTASSIUM PHOSPHATES 280-250MG
2 POWDER IN PACKET (EA) ORAL EVERY 4 HOURS
Status: COMPLETED | OUTPATIENT
Start: 2025-02-28 | End: 2025-02-28

## 2025-02-28 RX ORDER — WARFARIN SODIUM 5 MG/1
5 TABLET ORAL ONCE
Status: COMPLETED | OUTPATIENT
Start: 2025-02-28 | End: 2025-02-28

## 2025-02-28 RX ORDER — WARFARIN 2.5 MG/1
2.5 TABLET ORAL DAILY
Status: DISCONTINUED | OUTPATIENT
Start: 2025-03-01 | End: 2025-02-28

## 2025-02-28 RX ADMIN — POTASSIUM & SODIUM PHOSPHATES POWDER PACK 280-160-250 MG 2 PACKET: 280-160-250 PACK at 01:02

## 2025-02-28 RX ADMIN — NASAL 1 SPRAY: 6.5 SPRAY NASAL at 06:02

## 2025-02-28 RX ADMIN — TRAZODONE HYDROCHLORIDE 50 MG: 50 TABLET ORAL at 08:02

## 2025-02-28 RX ADMIN — IPRATROPIUM BROMIDE AND ALBUTEROL SULFATE 3 ML: 2.5; .5 SOLUTION RESPIRATORY (INHALATION) at 08:02

## 2025-02-28 RX ADMIN — IPRATROPIUM BROMIDE AND ALBUTEROL SULFATE 3 ML: 2.5; .5 SOLUTION RESPIRATORY (INHALATION) at 12:02

## 2025-02-28 RX ADMIN — CEFTRIAXONE 1 G: 1 INJECTION, POWDER, FOR SOLUTION INTRAMUSCULAR; INTRAVENOUS at 12:02

## 2025-02-28 RX ADMIN — ISOSORBIDE MONONITRATE 60 MG: 60 TABLET, EXTENDED RELEASE ORAL at 08:02

## 2025-02-28 RX ADMIN — DOXYCYCLINE HYCLATE 100 MG: 100 TABLET, COATED ORAL at 08:02

## 2025-02-28 RX ADMIN — IPRATROPIUM BROMIDE AND ALBUTEROL SULFATE 3 ML: 2.5; .5 SOLUTION RESPIRATORY (INHALATION) at 04:02

## 2025-02-28 RX ADMIN — WARFARIN SODIUM 5 MG: 5 TABLET ORAL at 04:02

## 2025-02-28 RX ADMIN — POTASSIUM & SODIUM PHOSPHATES POWDER PACK 280-160-250 MG 2 PACKET: 280-160-250 PACK at 09:02

## 2025-02-28 RX ADMIN — ATORVASTATIN CALCIUM 80 MG: 40 TABLET, FILM COATED ORAL at 09:02

## 2025-02-28 RX ADMIN — DOXYCYCLINE HYCLATE 100 MG: 100 TABLET, COATED ORAL at 09:02

## 2025-02-28 RX ADMIN — IPRATROPIUM BROMIDE AND ALBUTEROL SULFATE 3 ML: 2.5; .5 SOLUTION RESPIRATORY (INHALATION) at 09:02

## 2025-02-28 NOTE — PROGRESS NOTES
[facility]  Otorhinolaryngology-Head & Neck Surgery  Progress Note    Subjective:     Post-Op Info:  Procedure(s) (LRB):  CAUTERIZATION, NOSE, ENDOSCOPIC (N/A)   2 Days Post-Op  Hospital Day: 5     Interval History: Minimal scant bleeding from nose overnight that self resolved though in part due to fact he could not access the Afrin in the med lockbox.    Medications:  Continuous Infusions:  Scheduled Meds:   albuterol-ipratropium  3 mL Nebulization Q4H WAKE    atorvastatin  80 mg Oral Daily    cefTRIAXone (Rocephin) IV (PEDS and ADULTS)  1 g Intravenous Q24H    doxycycline  100 mg Oral Q12H    isosorbide mononitrate  60 mg Oral QHS    potassium, sodium phosphates  2 packet Oral Q4H    traZODone  50 mg Oral QHS     PRN Meds:  Current Facility-Administered Medications:     dextrose 50%, 12.5 g, Intravenous, PRN    dextrose 50%, 25 g, Intravenous, PRN    glucagon (human recombinant), 1 mg, Intramuscular, PRN    glucose, 16 g, Oral, PRN    glucose, 24 g, Oral, PRN    insulin aspart U-100, 0-5 Units, Subcutaneous, QID (AC + HS) PRN    naloxone, 0.02 mg, Intravenous, PRN    sodium chloride 0.9%, 10 mL, Intravenous, Q12H PRN     Review of patient's allergies indicates:   Allergen Reactions    Lisinopril     Losartan      Intolerance- elevates potassium level     Objective:     Vital Signs (24h Range):  Temp:  [96.7 °F (35.9 °C)-98.2 °F (36.8 °C)] 97.4 °F (36.3 °C)  Pulse:  [58-81] 73  Resp:  [16-18] 17  SpO2:  [94 %-99 %] 96 %  BP: (121-166)/(48-68) 166/63       Lines/Drains/Airways       Peripheral Intravenous Line  Duration                  Peripheral IV - Single Lumen 02/24/25 1234 20 G Anterior;Distal;Right Upper Arm 3 days         Peripheral IV - Single Lumen 02/26/25 1126 22 G Anterior;Left Hand 1 day                     Physical Exam  Posterior oropharynx with mixed old blood clot and mucoid drainage  No anterior epistaxis  Anterior septal perforation  Normal WOB on room air      Significant Labs:  CBC:    Recent Labs   Lab 02/28/25  0240   WBC 4.31   RBC 2.90*   HGB 8.2*   HCT 27.6*      MCV 95   MCH 28.3   MCHC 29.7*   INR normalized to goal      Assessment/Plan:     History of epistaxis  84 y.o. with history of recurrent epistaxis, on Coumadin for heart valve, who is s/p bilateral SP ligation and bilateral embolization and multiple cauterizations by ENT. He presents due to epistaxis 2/24/25. He is currently hemostatic in the ER. Hgb was 6.7, s/p 1u pRBC in the ER. Admitted to  for observation    lNR normalized to goal and no longer requiring transfusions.     Patient does not need to remain inpatient only for surgical intervention. Will coordinate surgery as an outpatient w follow up in Dr Russell's clinic next week    - No packing in place currently - currently hemostatic  - Keep head of bed elevated  - Apply Afrin to affected nasal cavity if epistaxis recurs, please see detailed instructions below   - Please call ENT with questions  - Remainder of care per primary team    Aftercare/ moisturizing recommendations to decrease frequency of nosebleeds:  - Daily nasal saline sprays/rinses  - Nightly application of vaseline/aquaphor to anterior nares  - Room humidification  - Avoidance of nasal cannula if possible (always with humidification and please use face tent, nasal cup, facemask if possible)  Management of medical conditions contributing to epistaxis (coagulopathy, hypertension, etc.)  - Humidification of CPAP appliance if applicable    If rebleeding occurs:   - Apply Afrin liberally to both nostrils  - Apply pressure over the soft part of the nose for 15 minutes (clip or digital pressure, important to have pressure over soft part of nose and consistent pressure (do not release pressure at any point))  - Instruct patient to lean forward, avoid swallowing blood. This can cause nausea and vomiting  - Can repeat these steps above up to 3 times  - Call/reconsult ENT or return to the nearest emergency  department if this is unsuccessful          Yesenia Cross MD  Otorhinolaryngology-Head & Neck Surgery  [facility]

## 2025-02-28 NOTE — ASSESSMENT & PLAN NOTE
Initially held warfarin due to supratherapeutic INR.     Plan  - Started 5 mg warfarin and 2.5 mg tomorrow  - Monitor PT-INR

## 2025-02-28 NOTE — PLAN OF CARE
[facility]  Discharge Reassessment    Primary Care Provider: Devon Langston Jr., MD    Expected Discharge Date: 2/28/2025    Reassessment (most recent)       Discharge Reassessment - 02/28/25 0814          Discharge Reassessment    Assessment Type Discharge Planning Reassessment (P)      Did the patient's condition or plan change since previous assessment? Yes (P)      Discharge Plan discussed with: Patient (P)      Communicated ASTER with patient/caregiver Yes (P)      Discharge Plan A Home with family;Home (P)      Discharge Plan B Home;Home with family (P)      DME Needed Upon Discharge  none (P)      Transition of Care Barriers None (P)      Why the patient remains in the hospital Requires continued medical care (P)         Post-Acute Status    Coverage [employer] (P)      Discharge Delays None known at this time (P)                      CM reviewed clinicals, spoke with medical team regarding pts discharge.  Pt will discharge home with follow up appts to PCP and ENT. No DME or HH needed or desired at this time. Will cont to coordinate care until discharge.   Discharge Plan A and Plan B have been determined by review of patient's clinical status, future medical and therapeutic needs, and coverage/benefits for post-acute care in coordination with multidisciplinary team members.     Maria Del Carmen Singleton, MSN   [facility]  [phone]

## 2025-02-28 NOTE — SUBJECTIVE & OBJECTIVE
Interval History: Minimal scant bleeding from nose overnight that self resolved though in part due to fact he could not access the Afrin in the med lockbox.    Medications:  Continuous Infusions:  Scheduled Meds:   albuterol-ipratropium  3 mL Nebulization Q4H WAKE    atorvastatin  80 mg Oral Daily    cefTRIAXone (Rocephin) IV (PEDS and ADULTS)  1 g Intravenous Q24H    doxycycline  100 mg Oral Q12H    isosorbide mononitrate  60 mg Oral QHS    potassium, sodium phosphates  2 packet Oral Q4H    traZODone  50 mg Oral QHS     PRN Meds:  Current Facility-Administered Medications:     dextrose 50%, 12.5 g, Intravenous, PRN    dextrose 50%, 25 g, Intravenous, PRN    glucagon (human recombinant), 1 mg, Intramuscular, PRN    glucose, 16 g, Oral, PRN    glucose, 24 g, Oral, PRN    insulin aspart U-100, 0-5 Units, Subcutaneous, QID (AC + HS) PRN    naloxone, 0.02 mg, Intravenous, PRN    sodium chloride 0.9%, 10 mL, Intravenous, Q12H PRN     Review of patient's allergies indicates:   Allergen Reactions    Lisinopril     Losartan      Intolerance- elevates potassium level     Objective:     Vital Signs (24h Range):  Temp:  [96.7 °F (35.9 °C)-98.2 °F (36.8 °C)] 97.4 °F (36.3 °C)  Pulse:  [58-81] 73  Resp:  [16-18] 17  SpO2:  [94 %-99 %] 96 %  BP: (121-166)/(48-68) 166/63       Lines/Drains/Airways       Peripheral Intravenous Line  Duration                  Peripheral IV - Single Lumen 02/24/25 1234 20 G Anterior;Distal;Right Upper Arm 3 days         Peripheral IV - Single Lumen 02/26/25 1126 22 G Anterior;Left Hand 1 day                     Physical Exam  Posterior oropharynx with mixed old blood clot and mucoid drainage  No anterior epistaxis  Anterior septal perforation  Normal WOB on room air      Significant Labs:  CBC:   Recent Labs   Lab 02/28/25  0240   WBC 4.31   RBC 2.90*   HGB 8.2*   HCT 27.6*      MCV 95   MCH 28.3   MCHC 29.7*   INR normalized to goal

## 2025-02-28 NOTE — PROGRESS NOTES
[facility] Medicine  Progress Note    Patient Name: Dariel Urbina  MRN: [id number]  Patient Class: IP- Inpatient   Admission Date: 2/24/2025  Length of Stay: 3 days  Attending Physician: Gina Spencer MD  Primary Care Provider: Devon Langston Jr., MD        Subjective     Principal Problem:Acute blood loss anemia        HPI:  84 y.o. male with  paroxysmal afib, mechanical aortic valve replacement on Coumadin, CHF, CKD stage 3, T2DM, HTN, recurrent epistaxis s/p multiple cauterizations who is admitted to hospital medicine for acute blood loss anemia 2/2 to profound epistaxis.     Pt noted to have intermittent nosebleeds since prior cauterization which resolve with pressure and afrin. Usually has 1-3 epistaxis episodes a day that last for 1-2 hours. But this past week they have worsened in severity and have been more difficult to control with pressure and Afrin. Today morning 2 AM, he had a large episode of epistaxis that wouldn't resolve with Afrin and pressure till about 11:30 AM prior to arrival at ED. Denies any trauma to the area or any specific triggering events. He reports this past week he has had cough, sore throat, and congestion. He denies any fatigue, dizziness, chest pain, or abdominal pain.     ED Course: XR chest unremarkable, Hgb 6.7, Hct 22.2 - 1 unit pRBC given    Overview/Hospital Course:  84 y.o. male with  paroxysmal afib, mechanical aortic valve replacement on Coumadin, CHF, CKD stage 3, T2DM, HTN, recurrent epistaxis s/p multiple cauterizations who is admitted to hospital medicine for acute blood loss anemia 2/2 to profound epistaxis. On admission, HgB- 6.7, received 1 unit of pRBCs. Repeat HgB s/p transfusion is 7.2. PT-INR goal per coumadin clinic is 2.2.5. Has wheezes on physical exam which is new. RIP panel unremarkable CXR concerning for possible infectious process. ENT planned for cauterization on 2/26, however pt wheezing worsened and it was decided to postpone  surgery. Started on Ceftriaxone/Doxy for CAP.  Monitoring Pt-INR. Started on Warfarin 5 mg bridging to 2.5 mg tomorrow.     Interval History: NAEO. Hemoglobin stable. INR is 2.3. Will resume Warfarin 5mg and 2.5 mg tomorrow. He did have a mild episode of epistaxis overnight.      Review of Systems  Objective:     Vital Signs (Most Recent):  Temp: 97.4 °F (36.3 °C) (02/28/25 0812)  Pulse: 65 (02/28/25 0839)  Resp: 18 (02/28/25 0839)  BP: (!) 166/63 (02/28/25 0812)  SpO2: 95 % (02/28/25 0839) Vital Signs (24h Range):  Temp:  [96.7 °F (35.9 °C)-98.2 °F (36.8 °C)] 97.4 °F (36.3 °C)  Pulse:  [58-81] 65  Resp:  [16-18] 18  SpO2:  [94 %-99 %] 95 %  BP: (121-166)/(48-68) 166/63     Weight: 65.8 kg (145 lb)  Body mass index is 23.4 kg/m².  No intake or output data in the 24 hours ending 02/28/25 0937        Physical Exam  HENT:      Nose:      Right Nostril: No epistaxis.      Left Nostril: No epistaxis.      Comments: Dry blood present in nostrils bilaterally. No active bleeding  Eyes:      Comments: Pale palpebra   Cardiovascular:      Rate and Rhythm: Normal rate.      Heart sounds: Murmur heard.   Pulmonary:      Breath sounds: Rhonchi present.   Neurological:      Mental Status: He is alert and oriented to person, place, and time. Mental status is at baseline.      Cranial Nerves: No cranial nerve deficit.             Significant Labs: All pertinent labs within the past 24 hours have been reviewed.    Significant Imaging: I have reviewed all pertinent imaging results/findings within the past 24 hours.    Assessment and Plan     * Acute blood loss anemia  Anemia is likely due to acute blood loss which was from Epistaxis related to warfarin . Most recent hemoglobin and hematocrit are listed below.  Recent Labs     02/25/25  0902 02/26/25  0325 02/27/25  0216   HGB 7.0* 6.6* 7.6*   HCT 22.9* 21.2* 25.0*       Plan  - If pt has bleed again, afrin instructions per ENT.   - Monitor serial CBC: Every 12 hours  - Transfuse PRBC  if patient becomes hemodynamically unstable, symptomatic or H/H drops below 7/21.        Right lower lobe pneumonia  Wheezes present on PE. Concerning for developing infection, at least post viral pneumonia. Not on O2.    Plan  -Started on ceftriaxone/doxy      History of epistaxis  Pt with chronic nosebleeds. Had multiple cauterizations in the past. ENT consulted. Holding anticoagulation.     - ENT consulted, appreciate recs  - Cauterization with ENT post-poned on 2/26 d/t supratherapeutic INR and developing PNA   - Likely outpatient cauterization with ENT       H/O mechanical aortic valve replacement  Resuming warfarin      Type 2 diabetes mellitus with stage 4 chronic kidney disease, without long-term current use of insulin  Creatine stable for now. BMP reviewed- noted Estimated Creatinine Clearance: 20.2 mL/min (A) (based on SCr of 2.5 mg/dL (H)). according to latest data. 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  Monitor UOP and serial BMP and adjust therapy as needed. Renally dose meds. Avoid nephrotoxic medications and procedures.    Plan  - Low dose sliding scale  -Holding allopurinol    Hyperlipidemia associated with type 2 diabetes mellitus    Continue home statin    Chronic anticoagulation  Initially held warfarin due to supratherapeutic INR.     Plan  - Started 5 mg warfarin and 2.5 mg tomorrow  - Monitor PT-INR        VTE Risk Mitigation (From admission, onward)           Ordered     warfarin (COUMADIN) tablet 2.5 mg  Daily         02/28/25 0841     warfarin (COUMADIN) tablet 5 mg  Once         02/28/25 0840     Reason for No Pharmacological VTE Prophylaxis  Once        Question:  Reasons:  Answer:  Active Bleeding    02/24/25 1421     IP VTE HIGH RISK PATIENT  Once         02/24/25 1421     Place sequential compression device  Until discontinued         02/24/25 1421                    Discharge Planning   ASTER: 2/28/2025     Code Status: Full Code   Medical Readiness for Discharge Date:  2/28/2025  Discharge Plan A: Home with family, Home   Discharge Delays: None known at this time                    Puma Arvizu MD  Department of Hospital Medicine   [facility]

## 2025-02-28 NOTE — PLAN OF CARE
Problem: Adult Inpatient Plan of Care  Goal: Plan of Care Review  Outcome: Progressing  Goal: Patient-Specific Goal (Individualized)  Outcome: Progressing  Goal: Absence of Hospital-Acquired Illness or Injury  Outcome: Progressing  Goal: Optimal Comfort and Wellbeing  Outcome: Progressing  Goal: Readiness for Transition of Care  Outcome: Progressing     Problem: Diabetes Comorbidity  Goal: Blood Glucose Level Within Targeted Range  Outcome: Progressing     Problem: Pneumonia  Goal: Fluid Balance  Outcome: Progressing  Goal: Resolution of Infection Signs and Symptoms  Outcome: Progressing  Goal: Effective Oxygenation and Ventilation  Outcome: Progressing   Pt Aox4, RA. Independent in room. Call light within reach, safety measures in place, rounded per facility policy.

## 2025-02-28 NOTE — SUBJECTIVE & OBJECTIVE
Interval History: NAEO. Hemoglobin stable. INR is 2.3. Will resume Warfarin 5mg and 2.5 mg tomorrow. He did have a mild episode of epistaxis overnight.      Review of Systems  Objective:     Vital Signs (Most Recent):  Temp: 97.4 °F (36.3 °C) (02/28/25 0812)  Pulse: 65 (02/28/25 0839)  Resp: 18 (02/28/25 0839)  BP: (!) 166/63 (02/28/25 0812)  SpO2: 95 % (02/28/25 0839) Vital Signs (24h Range):  Temp:  [96.7 °F (35.9 °C)-98.2 °F (36.8 °C)] 97.4 °F (36.3 °C)  Pulse:  [58-81] 65  Resp:  [16-18] 18  SpO2:  [94 %-99 %] 95 %  BP: (121-166)/(48-68) 166/63     Weight: 65.8 kg (145 lb)  Body mass index is 23.4 kg/m².  No intake or output data in the 24 hours ending 02/28/25 0937        Physical Exam  HENT:      Nose:      Right Nostril: No epistaxis.      Left Nostril: No epistaxis.      Comments: Dry blood present in nostrils bilaterally. No active bleeding  Eyes:      Comments: Pale palpebra   Cardiovascular:      Rate and Rhythm: Normal rate.      Heart sounds: Murmur heard.   Pulmonary:      Breath sounds: Rhonchi present.   Neurological:      Mental Status: He is alert and oriented to person, place, and time. Mental status is at baseline.      Cranial Nerves: No cranial nerve deficit.             Significant Labs: All pertinent labs within the past 24 hours have been reviewed.    Significant Imaging: I have reviewed all pertinent imaging results/findings within the past 24 hours.

## 2025-02-28 NOTE — ASSESSMENT & PLAN NOTE
84 y.o. with history of recurrent epistaxis, on Coumadin for heart valve, who is s/p bilateral SP ligation and bilateral embolization and multiple cauterizations by ENT. He presents due to epistaxis 2/24/25. He is currently hemostatic in the ER. Hgb was 6.7, s/p 1u pRBC in the ER. Admitted to  for observation    lNR normalized to goal and no longer requiring transfusions.     Patient does not need to remain inpatient only for surgical intervention. Will coordinate surgery as an outpatient w follow up in Dr Russell's clinic next week    - No packing in place currently - currently hemostatic  - Keep head of bed elevated  - Apply Afrin to affected nasal cavity if epistaxis recurs, please see detailed instructions below   - Please call ENT with questions  - Remainder of care per primary team    Aftercare/ moisturizing recommendations to decrease frequency of nosebleeds:  - Daily nasal saline sprays/rinses  - Nightly application of vaseline/aquaphor to anterior nares  - Room humidification  - Avoidance of nasal cannula if possible (always with humidification and please use face tent, nasal cup, facemask if possible)  Management of medical conditions contributing to epistaxis (coagulopathy, hypertension, etc.)  - Humidification of CPAP appliance if applicable    If rebleeding occurs:   - Apply Afrin liberally to both nostrils  - Apply pressure over the soft part of the nose for 15 minutes (clip or digital pressure, important to have pressure over soft part of nose and consistent pressure (do not release pressure at any point))  - Instruct patient to lean forward, avoid swallowing blood. This can cause nausea and vomiting  - Can repeat these steps above up to 3 times  - Call/reconsult ENT or return to the nearest emergency department if this is unsuccessful

## 2025-02-28 NOTE — PHARMACY MED REC
"              .  Admission Medication History     The home medication history was taken by Fabiola Vargas.    You may go to "Admission" then "Reconcile Home Medications" tabs to review and/or act upon these items.     The home medication list has been updated by the Pharmacy department.   Please read ALL comments highlighted in yellow.   Please address this information as you see fit.    Feel free to contact us if you have any questions or require assistance.        Medications listed below were obtained from: Patient/family and Analytic software- [employer] Medications   Medication Sig    acetaminophen (TYLENOL) 500 MG tablet   Take 500 mg by mouth daily as needed for Pain.    albuterol (VENTOLIN HFA) 90 mcg/actuation inhaler   Inhale 2 puffs into the lungs every 6 (six) hours as needed for Wheezing. Rescue    allopurinoL (ZYLOPRIM) 100 MG tablet   Take 1 tablet (100 mg total) by mouth once daily.    bumetanide (BUMEX) 1 MG tablet   Take 1 tablet by mouth once daily.   Patient takes 2 tablets as needed.      ferrous gluconate (FERGON) 324 MG tablet   Take 1 tablet every day with breakfast.     isosorbide mononitrate (IMDUR) 60 MG 24 hr tablet   Take 1 tablet (60 mg total) by mouth once daily.      omega-3 fatty acids/fish oil (FISH OIL-OMEGA-3 FATTY ACIDS) 300-1,000 mg capsule   Take 1 capsule by mouth once daily.    oxymetazoline (AFRIN) 0.05 % nasal spray   2 sprays by Nasal route as needed (nose bleeds).    rosuvastatin (CRESTOR) 40 MG Tab   Take 1 tablet every evening.     sodium chloride (SALINE NASAL) 0.65 % nasal spray   Use 2 sprays by Nasal route every 4 hours. Apply into nasal cavities daily with nightly application of vaseline/aquaphor to anterior nares. Keep head of bed elevated.      traZODone (DESYREL) 50 MG tablet   Take 1 tablet (50 mg total) by mouth every evening as needed for insomnia.    warfarin (COUMADIN) 5 MG tablet Take 1/2 tablet (2.5 mg) Friday, then take 1 tablet (5 mg) Saturday, " Sunday, Take 1/2 tablet (2.5 mg) Monday, and Then take 1 tablet (5 mg) Tuesday, Wednesday, and Thursday.                 Fabiola Vargas  [id number]

## 2025-03-01 LAB
ALBUMIN SERPL BCP-MCNC: 2.7 G/DL (ref 3.5–5.2)
ALP SERPL-CCNC: 81 U/L (ref 40–150)
ALT SERPL W/O P-5'-P-CCNC: 8 U/L (ref 10–44)
ANION GAP SERPL CALC-SCNC: 8 MMOL/L (ref 8–16)
APTT PPP: 30.2 SEC (ref 21–32)
APTT PPP: 30.2 SEC (ref 21–32)
APTT PPP: 31.6 SEC (ref 21–32)
AST SERPL-CCNC: 16 U/L (ref 10–40)
BASOPHILS # BLD AUTO: 0.02 K/UL (ref 0–0.2)
BASOPHILS # BLD AUTO: 0.03 K/UL (ref 0–0.2)
BASOPHILS NFR BLD: 0.5 % (ref 0–1.9)
BASOPHILS NFR BLD: 0.7 % (ref 0–1.9)
BILIRUB SERPL-MCNC: 0.4 MG/DL (ref 0.1–1)
BUN SERPL-MCNC: 26 MG/DL (ref 8–23)
CALCIUM SERPL-MCNC: 9.3 MG/DL (ref 8.7–10.5)
CHLORIDE SERPL-SCNC: 113 MMOL/L (ref 95–110)
CO2 SERPL-SCNC: 18 MMOL/L (ref 23–29)
CREAT SERPL-MCNC: 2 MG/DL (ref 0.5–1.4)
DIFFERENTIAL METHOD BLD: ABNORMAL
DIFFERENTIAL METHOD BLD: ABNORMAL
EOSINOPHIL # BLD AUTO: 0.2 K/UL (ref 0–0.5)
EOSINOPHIL # BLD AUTO: 0.2 K/UL (ref 0–0.5)
EOSINOPHIL NFR BLD: 3.5 % (ref 0–8)
EOSINOPHIL NFR BLD: 4.6 % (ref 0–8)
ERYTHROCYTE [DISTWIDTH] IN BLOOD BY AUTOMATED COUNT: 15.2 % (ref 11.5–14.5)
ERYTHROCYTE [DISTWIDTH] IN BLOOD BY AUTOMATED COUNT: 15.3 % (ref 11.5–14.5)
EST. GFR  (NO RACE VARIABLE): 32.5 ML/MIN/1.73 M^2
GLUCOSE SERPL-MCNC: 68 MG/DL (ref 70–110)
HCT VFR BLD AUTO: 24.3 % (ref 40–54)
HCT VFR BLD AUTO: 25.7 % (ref 40–54)
HGB BLD-MCNC: 7.5 G/DL (ref 14–18)
HGB BLD-MCNC: 7.8 G/DL (ref 14–18)
IMM GRANULOCYTES # BLD AUTO: 0.02 K/UL (ref 0–0.04)
IMM GRANULOCYTES # BLD AUTO: 0.02 K/UL (ref 0–0.04)
IMM GRANULOCYTES NFR BLD AUTO: 0.5 % (ref 0–0.5)
IMM GRANULOCYTES NFR BLD AUTO: 0.5 % (ref 0–0.5)
INR PPP: 1.7 (ref 0.8–1.2)
INR PPP: 1.7 (ref 0.8–1.2)
LYMPHOCYTES # BLD AUTO: 0.7 K/UL (ref 1–4.8)
LYMPHOCYTES # BLD AUTO: 0.9 K/UL (ref 1–4.8)
LYMPHOCYTES NFR BLD: 16.9 % (ref 18–48)
LYMPHOCYTES NFR BLD: 24.2 % (ref 18–48)
MAGNESIUM SERPL-MCNC: 1.7 MG/DL (ref 1.6–2.6)
MCH RBC QN AUTO: 28.2 PG (ref 27–31)
MCH RBC QN AUTO: 28.8 PG (ref 27–31)
MCHC RBC AUTO-ENTMCNC: 30.4 G/DL (ref 32–36)
MCHC RBC AUTO-ENTMCNC: 30.9 G/DL (ref 32–36)
MCV RBC AUTO: 93 FL (ref 82–98)
MCV RBC AUTO: 94 FL (ref 82–98)
MONOCYTES # BLD AUTO: 0.5 K/UL (ref 0.3–1)
MONOCYTES # BLD AUTO: 0.6 K/UL (ref 0.3–1)
MONOCYTES NFR BLD: 11.6 % (ref 4–15)
MONOCYTES NFR BLD: 15.5 % (ref 4–15)
NEUTROPHILS # BLD AUTO: 2 K/UL (ref 1.8–7.7)
NEUTROPHILS # BLD AUTO: 2.9 K/UL (ref 1.8–7.7)
NEUTROPHILS NFR BLD: 54.7 % (ref 38–73)
NEUTROPHILS NFR BLD: 66.8 % (ref 38–73)
NRBC BLD-RTO: 0 /100 WBC
NRBC BLD-RTO: 0 /100 WBC
PHOSPHATE SERPL-MCNC: 3.9 MG/DL (ref 2.7–4.5)
PLATELET # BLD AUTO: 164 K/UL (ref 150–450)
PLATELET # BLD AUTO: 191 K/UL (ref 150–450)
PMV BLD AUTO: 10.5 FL (ref 9.2–12.9)
PMV BLD AUTO: 10.5 FL (ref 9.2–12.9)
POCT GLUCOSE: 105 MG/DL (ref 70–110)
POCT GLUCOSE: 76 MG/DL (ref 70–110)
POCT GLUCOSE: 85 MG/DL (ref 70–110)
POCT GLUCOSE: 95 MG/DL (ref 70–110)
POTASSIUM SERPL-SCNC: 4.3 MMOL/L (ref 3.5–5.1)
PROT SERPL-MCNC: 6.2 G/DL (ref 6–8.4)
PROTHROMBIN TIME: 17.9 SEC (ref 9–12.5)
PROTHROMBIN TIME: 18.2 SEC (ref 9–12.5)
RBC # BLD AUTO: 2.6 M/UL (ref 4.6–6.2)
RBC # BLD AUTO: 2.77 M/UL (ref 4.6–6.2)
SODIUM SERPL-SCNC: 139 MMOL/L (ref 136–145)
WBC # BLD AUTO: 3.68 K/UL (ref 3.9–12.7)
WBC # BLD AUTO: 4.31 K/UL (ref 3.9–12.7)

## 2025-03-01 PROCEDURE — 85610 PROTHROMBIN TIME: CPT | Mod: HCNC

## 2025-03-01 PROCEDURE — 36415 COLL VENOUS BLD VENIPUNCTURE: CPT | Mod: HCNC

## 2025-03-01 PROCEDURE — 36415 COLL VENOUS BLD VENIPUNCTURE: CPT | Mod: HCNC,XB

## 2025-03-01 PROCEDURE — 25000003 PHARM REV CODE 250: Mod: HCNC

## 2025-03-01 PROCEDURE — 85025 COMPLETE CBC W/AUTO DIFF WBC: CPT | Mod: HCNC

## 2025-03-01 PROCEDURE — 85025 COMPLETE CBC W/AUTO DIFF WBC: CPT | Mod: 91,HCNC

## 2025-03-01 PROCEDURE — 85610 PROTHROMBIN TIME: CPT | Mod: 91,HCNC

## 2025-03-01 PROCEDURE — 99900035 HC TECH TIME PER 15 MIN (STAT): Mod: HCNC

## 2025-03-01 PROCEDURE — 84100 ASSAY OF PHOSPHORUS: CPT | Mod: HCNC

## 2025-03-01 PROCEDURE — 11000001 HC ACUTE MED/SURG PRIVATE ROOM: Mod: HCNC

## 2025-03-01 PROCEDURE — 94761 N-INVAS EAR/PLS OXIMETRY MLT: CPT | Mod: HCNC

## 2025-03-01 PROCEDURE — 85730 THROMBOPLASTIN TIME PARTIAL: CPT | Mod: HCNC

## 2025-03-01 PROCEDURE — 25000242 PHARM REV CODE 250 ALT 637 W/ HCPCS: Mod: HCNC

## 2025-03-01 PROCEDURE — 94640 AIRWAY INHALATION TREATMENT: CPT | Mod: HCNC

## 2025-03-01 PROCEDURE — 85730 THROMBOPLASTIN TIME PARTIAL: CPT | Mod: 91,HCNC

## 2025-03-01 PROCEDURE — 63600175 PHARM REV CODE 636 W HCPCS: Mod: HCNC

## 2025-03-01 PROCEDURE — 83735 ASSAY OF MAGNESIUM: CPT | Mod: HCNC

## 2025-03-01 PROCEDURE — 80053 COMPREHEN METABOLIC PANEL: CPT | Mod: HCNC

## 2025-03-01 RX ORDER — SENNOSIDES 8.6 MG/1
8.6 TABLET ORAL DAILY PRN
Status: DISCONTINUED | OUTPATIENT
Start: 2025-03-01 | End: 2025-03-07 | Stop reason: HOSPADM

## 2025-03-01 RX ORDER — HEPARIN SODIUM,PORCINE/D5W 25000/250
0-40 INTRAVENOUS SOLUTION INTRAVENOUS CONTINUOUS
Status: DISCONTINUED | OUTPATIENT
Start: 2025-03-01 | End: 2025-03-01

## 2025-03-01 RX ORDER — IPRATROPIUM BROMIDE AND ALBUTEROL SULFATE 2.5; .5 MG/3ML; MG/3ML
3 SOLUTION RESPIRATORY (INHALATION) EVERY 6 HOURS PRN
Status: DISCONTINUED | OUTPATIENT
Start: 2025-03-01 | End: 2025-03-07 | Stop reason: HOSPADM

## 2025-03-01 RX ORDER — WARFARIN 2.5 MG/1
2.5 TABLET ORAL DAILY
Status: DISCONTINUED | OUTPATIENT
Start: 2025-03-01 | End: 2025-03-03

## 2025-03-01 RX ORDER — POLYETHYLENE GLYCOL 3350 17 G/17G
17 POWDER, FOR SOLUTION ORAL DAILY
Status: DISCONTINUED | OUTPATIENT
Start: 2025-03-01 | End: 2025-03-07 | Stop reason: HOSPADM

## 2025-03-01 RX ADMIN — CEFTRIAXONE 1 G: 1 INJECTION, POWDER, FOR SOLUTION INTRAMUSCULAR; INTRAVENOUS at 11:03

## 2025-03-01 RX ADMIN — DOXYCYCLINE HYCLATE 100 MG: 100 TABLET, COATED ORAL at 08:03

## 2025-03-01 RX ADMIN — WARFARIN SODIUM 2.5 MG: 2.5 TABLET ORAL at 05:03

## 2025-03-01 RX ADMIN — ISOSORBIDE MONONITRATE 60 MG: 60 TABLET, EXTENDED RELEASE ORAL at 08:03

## 2025-03-01 RX ADMIN — DOXYCYCLINE HYCLATE 100 MG: 100 TABLET, COATED ORAL at 09:03

## 2025-03-01 RX ADMIN — IPRATROPIUM BROMIDE AND ALBUTEROL SULFATE 3 ML: 2.5; .5 SOLUTION RESPIRATORY (INHALATION) at 07:03

## 2025-03-01 RX ADMIN — HEPARIN SODIUM AND DEXTROSE 12 UNITS/KG/HR: 10000; 5 INJECTION INTRAVENOUS at 12:03

## 2025-03-01 RX ADMIN — ATORVASTATIN CALCIUM 80 MG: 40 TABLET, FILM COATED ORAL at 09:03

## 2025-03-01 RX ADMIN — NASAL 1 SPRAY: 6.5 SPRAY NASAL at 09:03

## 2025-03-01 RX ADMIN — TRAZODONE HYDROCHLORIDE 50 MG: 50 TABLET ORAL at 08:03

## 2025-03-01 NOTE — ASSESSMENT & PLAN NOTE
Initially held warfarin due to supratherapeutic INR. Now subtherapeutic.     Plan  - Started 5 mg warfarin, bridging with heparin.   - Monitor PT-INR

## 2025-03-01 NOTE — SUBJECTIVE & OBJECTIVE
Interval History: No acute events overnight. INR subtherapeutic. Started on heparin to bridge.     Review of Systems  Objective:     Vital Signs (Most Recent):  Temp: 98 °F (36.7 °C) (03/01/25 0745)  Pulse: 67 (03/01/25 0758)  Resp: 20 (03/01/25 0758)  BP: (!) 154/70 (03/01/25 0745)  SpO2: 98 % (03/01/25 0758) Vital Signs (24h Range):  Temp:  [97.1 °F (36.2 °C)-98.7 °F (37.1 °C)] 98 °F (36.7 °C)  Pulse:  [54-83] 67  Resp:  [17-20] 20  SpO2:  [95 %-99 %] 98 %  BP: (122-160)/(58-70) 154/70     Weight: 65.8 kg (145 lb)  Body mass index is 23.4 kg/m².  No intake or output data in the 24 hours ending 03/01/25 0842      Physical Exam  HENT:      Nose:      Right Nostril: No epistaxis.      Left Nostril: No epistaxis.      Comments: Dry blood present in nostrils bilaterally. No active bleeding  Eyes:      Comments: Pale palpebra   Cardiovascular:      Rate and Rhythm: Normal rate.      Heart sounds: Murmur heard.   Pulmonary:      Breath sounds: Rhonchi present.   Neurological:      Mental Status: He is alert and oriented to person, place, and time. Mental status is at baseline.      Cranial Nerves: No cranial nerve deficit.             Significant Labs: All pertinent labs within the past 24 hours have been reviewed.    Significant Imaging: I have reviewed all pertinent imaging results/findings within the past 24 hours.

## 2025-03-01 NOTE — PROGRESS NOTES
[facility] Medicine  Progress Note    Patient Name: Dariel Urbina  MRN: [id number]  Patient Class: IP- Inpatient   Admission Date: 2/24/2025  Length of Stay: 4 days  Attending Physician: Gina Spencer MD  Primary Care Provider: Devon Langston Jr., MD        Subjective     Principal Problem:Acute blood loss anemia        HPI:  84 y.o. male with  paroxysmal afib, mechanical aortic valve replacement on Coumadin, CHF, CKD stage 3, T2DM, HTN, recurrent epistaxis s/p multiple cauterizations who is admitted to hospital medicine for acute blood loss anemia 2/2 to profound epistaxis.     Pt noted to have intermittent nosebleeds since prior cauterization which resolve with pressure and afrin. Usually has 1-3 epistaxis episodes a day that last for 1-2 hours. But this past week they have worsened in severity and have been more difficult to control with pressure and Afrin. Today morning 2 AM, he had a large episode of epistaxis that wouldn't resolve with Afrin and pressure till about 11:30 AM prior to arrival at ED. Denies any trauma to the area or any specific triggering events. He reports this past week he has had cough, sore throat, and congestion. He denies any fatigue, dizziness, chest pain, or abdominal pain.     ED Course: XR chest unremarkable, Hgb 6.7, Hct 22.2 - 1 unit pRBC given    Overview/Hospital Course:  84 y.o. male with  paroxysmal afib, mechanical aortic valve replacement on Coumadin, CHF, CKD stage 3, T2DM, HTN, recurrent epistaxis s/p multiple cauterizations who is admitted to hospital medicine for acute blood loss anemia 2/2 to profound epistaxis. On admission, HgB- 6.7, received 1 unit of pRBCs. Repeat HgB s/p transfusion is 7.2. PT-INR goal per coumadin clinic is 2.2.5. Has wheezes on physical exam which is new. Covid/Flu/RSV panel unremarkable CXR concerning for possible infectious process. ENT planned for cauterization on 2/26, however pt wheezing worsened and it was decided to  postpone surgery. Started on Ceftriaxone/Doxy for CAP.  Monitoring Pt-INR. Started on Warfarin 5 mg but PT-INR subtherapeutic now. Started on Heparin to bridge.     Interval History: No acute events overnight. INR subtherapeutic. Started on heparin to bridge.     Review of Systems  Objective:     Vital Signs (Most Recent):  Temp: 98 °F (36.7 °C) (03/01/25 0745)  Pulse: 67 (03/01/25 0758)  Resp: 20 (03/01/25 0758)  BP: (!) 154/70 (03/01/25 0745)  SpO2: 98 % (03/01/25 0758) Vital Signs (24h Range):  Temp:  [97.1 °F (36.2 °C)-98.7 °F (37.1 °C)] 98 °F (36.7 °C)  Pulse:  [54-83] 67  Resp:  [17-20] 20  SpO2:  [95 %-99 %] 98 %  BP: (122-160)/(58-70) 154/70     Weight: 65.8 kg (145 lb)  Body mass index is 23.4 kg/m².  No intake or output data in the 24 hours ending 03/01/25 0842      Physical Exam  HENT:      Nose:      Right Nostril: No epistaxis.      Left Nostril: No epistaxis.      Comments: Dry blood present in nostrils bilaterally. No active bleeding  Eyes:      Comments: Pale palpebra   Cardiovascular:      Rate and Rhythm: Normal rate.      Heart sounds: Murmur heard.   Pulmonary:      Breath sounds: Rhonchi present.   Neurological:      Mental Status: He is alert and oriented to person, place, and time. Mental status is at baseline.      Cranial Nerves: No cranial nerve deficit.             Significant Labs: All pertinent labs within the past 24 hours have been reviewed.    Significant Imaging: I have reviewed all pertinent imaging results/findings within the past 24 hours.    Assessment and Plan     * Acute blood loss anemia  Anemia is likely due to acute blood loss which was from Epistaxis related to warfarin . Most recent hemoglobin and hematocrit are listed below.  Recent Labs     02/25/25  0902 02/26/25  0325 02/27/25  0216   HGB 7.0* 6.6* 7.6*   HCT 22.9* 21.2* 25.0*       Plan  - If pt has bleed again, afrin instructions per ENT.   - Monitor serial CBC: Every 12 hours  - Transfuse PRBC if patient becomes  hemodynamically unstable, symptomatic or H/H drops below 7/21.        Right lower lobe pneumonia  Wheezes present on PE. Concerning for developing infection, at least post viral pneumonia. Not on O2.    Plan  -Started on ceftriaxone/doxy      History of epistaxis  Pt with chronic nosebleeds. Had multiple cauterizations in the past. ENT consulted. Holding anticoagulation.     - ENT consulted, appreciate recs  - Cauterization with ENT post-poned on 2/26 d/t supratherapeutic INR and developing PNA   - Likely outpatient cauterization with ENT       H/O mechanical aortic valve replacement  Resuming warfarin      Type 2 diabetes mellitus with stage 4 chronic kidney disease, without long-term current use of insulin  Creatine stable for now. BMP reviewed- noted Estimated Creatinine Clearance: 20.2 mL/min (A) (based on SCr of 2.5 mg/dL (H)). according to latest data. 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  Monitor UOP and serial BMP and adjust therapy as needed. Renally dose meds. Avoid nephrotoxic medications and procedures.    Plan  - Low dose sliding scale  -Holding allopurinol    Hyperlipidemia associated with type 2 diabetes mellitus    Continue home statin    Chronic anticoagulation  Initially held warfarin due to supratherapeutic INR. Now subtherapeutic.     Plan  - Started 5 mg warfarin, bridging with heparin.   - Monitor PT-INR        VTE Risk Mitigation (From admission, onward)           Ordered     warfarin (COUMADIN) tablet 2.5 mg  Daily         02/28/25 0841     heparin 25,000 units in dextrose 5% (100 units/ml) IV bolus from bag LOW INTENSITY nomogram - [facility]  As needed (PRN)        Question:  Heparin Infusion Adjustment (DO NOT MODIFY ANSWER)  Answer:  [email or web address]\Images\Pharmacy\HeparinInfusions\heparin LOW INTENSITY nomogram for [facility] [id number]    03/01/25 0713     heparin 25,000 units in dextrose 5% (100 units/ml) IV bolus from bag LOW INTENSITY nomogram - [facility]  As needed (PRN)         Question:  Heparin Infusion Adjustment (DO NOT MODIFY ANSWER)  Answer:  [email or web address]\Images\Pharmacy\HeparinInfusions\heparin LOW INTENSITY nomogram for [facility] [id number]    03/01/25 0713     heparin 25,000 units in dextrose 5% (100 units/ml) IV bolus from bag LOW INTENSITY nomogram - [facility]  Once        Question:  Heparin Infusion Adjustment (DO NOT MODIFY ANSWER)  Answer:  [email or web address]\Images\Pharmacy\HeparinInfusions\heparin LOW INTENSITY nomogram for [facility] [id number]    03/01/25 0713     heparin 25,000 units in dextrose 5% 250 mL (100 units/mL) infusion LOW INTENSITY nomogram - [facility]  Continuous        Question:  Begin at (units/kg/hr)  Answer:  12    03/01/25 0713     Reason for No Pharmacological VTE Prophylaxis  Once        Question:  Reasons:  Answer:  Active Bleeding    02/24/25 1421     IP VTE HIGH RISK PATIENT  Once         02/24/25 1421     Place sequential compression device  Until discontinued         02/24/25 1421                    Discharge Planning   ASTER: 3/3/2025     Code Status: Full Code   Medical Readiness for Discharge Date:   Discharge Plan A: Home with family, Home   Discharge Delays: None known at this time                    Puma Arvizu MD  Department of Hospital Medicine   [facility]

## 2025-03-02 LAB
ALBUMIN SERPL BCP-MCNC: 3 G/DL (ref 3.5–5.2)
ALP SERPL-CCNC: 83 U/L (ref 40–150)
ALT SERPL W/O P-5'-P-CCNC: 9 U/L (ref 10–44)
ANION GAP SERPL CALC-SCNC: 8 MMOL/L (ref 8–16)
AST SERPL-CCNC: 16 U/L (ref 10–40)
BASOPHILS # BLD AUTO: 0.03 K/UL (ref 0–0.2)
BASOPHILS NFR BLD: 0.6 % (ref 0–1.9)
BILIRUB SERPL-MCNC: 0.5 MG/DL (ref 0.1–1)
BUN SERPL-MCNC: 30 MG/DL (ref 8–23)
CALCIUM SERPL-MCNC: 9.7 MG/DL (ref 8.7–10.5)
CHLORIDE SERPL-SCNC: 111 MMOL/L (ref 95–110)
CO2 SERPL-SCNC: 18 MMOL/L (ref 23–29)
CREAT SERPL-MCNC: 1.9 MG/DL (ref 0.5–1.4)
DIFFERENTIAL METHOD BLD: ABNORMAL
EOSINOPHIL # BLD AUTO: 0.2 K/UL (ref 0–0.5)
EOSINOPHIL NFR BLD: 3.7 % (ref 0–8)
ERYTHROCYTE [DISTWIDTH] IN BLOOD BY AUTOMATED COUNT: 15.1 % (ref 11.5–14.5)
EST. GFR  (NO RACE VARIABLE): 34.6 ML/MIN/1.73 M^2
GLUCOSE SERPL-MCNC: 80 MG/DL (ref 70–110)
HCT VFR BLD AUTO: 25.6 % (ref 40–54)
HGB BLD-MCNC: 7.7 G/DL (ref 14–18)
IMM GRANULOCYTES # BLD AUTO: 0.02 K/UL (ref 0–0.04)
IMM GRANULOCYTES NFR BLD AUTO: 0.4 % (ref 0–0.5)
INR PPP: 1.7 (ref 0.8–1.2)
LYMPHOCYTES # BLD AUTO: 0.7 K/UL (ref 1–4.8)
LYMPHOCYTES NFR BLD: 14.3 % (ref 18–48)
MAGNESIUM SERPL-MCNC: 1.7 MG/DL (ref 1.6–2.6)
MCH RBC QN AUTO: 28.4 PG (ref 27–31)
MCHC RBC AUTO-ENTMCNC: 30.1 G/DL (ref 32–36)
MCV RBC AUTO: 95 FL (ref 82–98)
MONOCYTES # BLD AUTO: 0.5 K/UL (ref 0.3–1)
MONOCYTES NFR BLD: 9.3 % (ref 4–15)
NEUTROPHILS # BLD AUTO: 3.5 K/UL (ref 1.8–7.7)
NEUTROPHILS NFR BLD: 71.7 % (ref 38–73)
NRBC BLD-RTO: 0 /100 WBC
PHOSPHATE SERPL-MCNC: 3 MG/DL (ref 2.7–4.5)
PLATELET # BLD AUTO: 185 K/UL (ref 150–450)
PMV BLD AUTO: 10 FL (ref 9.2–12.9)
POCT GLUCOSE: 103 MG/DL (ref 70–110)
POCT GLUCOSE: 105 MG/DL (ref 70–110)
POCT GLUCOSE: 82 MG/DL (ref 70–110)
POCT GLUCOSE: 95 MG/DL (ref 70–110)
POTASSIUM SERPL-SCNC: 4.5 MMOL/L (ref 3.5–5.1)
PROT SERPL-MCNC: 6.6 G/DL (ref 6–8.4)
PROTHROMBIN TIME: 17.6 SEC (ref 9–12.5)
RBC # BLD AUTO: 2.71 M/UL (ref 4.6–6.2)
SODIUM SERPL-SCNC: 137 MMOL/L (ref 136–145)
WBC # BLD AUTO: 4.84 K/UL (ref 3.9–12.7)

## 2025-03-02 PROCEDURE — 85610 PROTHROMBIN TIME: CPT | Mod: HCNC

## 2025-03-02 PROCEDURE — 84100 ASSAY OF PHOSPHORUS: CPT | Mod: HCNC

## 2025-03-02 PROCEDURE — 25000003 PHARM REV CODE 250: Mod: HCNC

## 2025-03-02 PROCEDURE — 63600175 PHARM REV CODE 636 W HCPCS: Mod: HCNC

## 2025-03-02 PROCEDURE — 83735 ASSAY OF MAGNESIUM: CPT | Mod: HCNC

## 2025-03-02 PROCEDURE — 11000001 HC ACUTE MED/SURG PRIVATE ROOM: Mod: HCNC

## 2025-03-02 PROCEDURE — 36415 COLL VENOUS BLD VENIPUNCTURE: CPT | Mod: HCNC

## 2025-03-02 PROCEDURE — 99222 1ST HOSP IP/OBS MODERATE 55: CPT | Mod: HCNC,,, | Performed by: OTOLARYNGOLOGY

## 2025-03-02 PROCEDURE — 80053 COMPREHEN METABOLIC PANEL: CPT | Mod: HCNC

## 2025-03-02 PROCEDURE — 85025 COMPLETE CBC W/AUTO DIFF WBC: CPT | Mod: HCNC

## 2025-03-02 RX ADMIN — TRAZODONE HYDROCHLORIDE 50 MG: 50 TABLET ORAL at 08:03

## 2025-03-02 RX ADMIN — DOXYCYCLINE HYCLATE 100 MG: 100 TABLET, COATED ORAL at 08:03

## 2025-03-02 RX ADMIN — CEFTRIAXONE 1 G: 1 INJECTION, POWDER, FOR SOLUTION INTRAMUSCULAR; INTRAVENOUS at 01:03

## 2025-03-02 RX ADMIN — ISOSORBIDE MONONITRATE 60 MG: 60 TABLET, EXTENDED RELEASE ORAL at 08:03

## 2025-03-02 RX ADMIN — ATORVASTATIN CALCIUM 80 MG: 40 TABLET, FILM COATED ORAL at 08:03

## 2025-03-02 RX ADMIN — WARFARIN SODIUM 2.5 MG: 2.5 TABLET ORAL at 05:03

## 2025-03-02 NOTE — MEDICAL/APP STUDENT
[facility] Medicine Student   Progress Note  [facility] HOSP MED 5    Admit Date: 2/24/2025  Hospital Day: 5  03/02/2025  10:10 AM    SUBJECTIVE:   Mr. Dariel Urbina is a 84 y.o. male with a relevant medical history of T2DM, CKD IV, Afib with mechanical aortic valve on Warfarin, combined CHF, recurrent epistaxis s/p multiple interventions who is being followed up for Acute blood loss anemia 2/2 to profound epistaxis.    Interval history: Persistent nosebleed throughout the night that did not respond to pressure or afrin. ENT placed merocel nasal packing in L naris. Hemoglobin stable. INR continues to be subtherapeutic at 1.7.      Please refer to the H&P for past medical, family, and social history.    OBJECTIVE:     Vital Signs Recent:  Temp: 97.3 °F (36.3 °C) (03/02/25 0730)  Pulse: 65 (03/02/25 0730)  Resp: 18 (03/02/25 0730)  BP: (!) 155/67 (03/02/25 0730)  SpO2: 98 % (03/02/25 0730)  Oxygen Documentation:                Device (Oxygen Therapy): room air         Vital Signs Range (Last 24H):  Temp:  [97.3 °F (36.3 °C)-98.5 °F (36.9 °C)]   Pulse:  [62-75]   Resp:  [17-20]   BP: (145-169)/(63-68)   SpO2:  [95 %-99 %]        I & O (Last 24H):No intake or output data in the 24 hours ending 03/02/25 1010     Physical Exam:  Physical Exam  Constitutional:       General: He is not in acute distress.     Appearance: Normal appearance. He is not ill-appearing.   HENT:      Head: Normocephalic and atraumatic.      Nose: Congestion present.      Comments: Dried blood in R naris. Nasal packing in L naris.     Mouth/Throat:      Mouth: Mucous membranes are dry.      Pharynx: Oropharynx is clear.   Eyes:      Extraocular Movements: Extraocular movements intact.      Pupils: Pupils are equal, round, and reactive to light.      Comments: Pale palpebra   Cardiovascular:      Rate and Rhythm: Normal rate and regular rhythm.      Heart sounds: Murmur heard.   Pulmonary:      Effort: Pulmonary effort is normal. No respiratory  distress.      Breath sounds: Wheezing present.   Abdominal:      General: Abdomen is flat. Bowel sounds are normal. There is no distension.      Palpations: Abdomen is soft.      Tenderness: There is no abdominal tenderness.   Musculoskeletal:      Cervical back: Normal range of motion and neck supple.      Right lower leg: No edema.      Left lower leg: No edema.   Skin:     General: Skin is warm and dry.   Neurological:      General: No focal deficit present.      Mental Status: He is alert and oriented to person, place, and time.         Labs:   Recent Labs   Lab 02/28/25  0239 03/01/25  0534    139   K 4.1 4.3   * 113*   CO2 19* 18*   BUN 30* 26*   CREATININE 2.2* 2.0*   GLU 96 68*   CALCIUM 9.5 9.3   MG 1.9 1.7   PHOS 2.6* 3.9     Recent Labs   Lab 02/27/25  0216 02/28/25  0239 03/01/25  0534 03/01/25  1604   ALKPHOS 77 90 81  --    ALT 9* 9* 8*  --    AST 23 23 16  --    ALBUMIN 2.9* 2.9* 2.7*  --    PROT 6.5 6.8 6.2  --    BILITOT 0.5 0.4 0.4  --    INR 3.2* 2.3* 1.7* 1.7*     Recent Labs   Lab 02/28/25  0240 03/01/25  0534 03/01/25  1604   WBC 4.31 3.68* 4.31   HGB 8.2* 7.5* 7.8*   HCT 27.6* 24.3* 25.7*    164 191        Significant Imaging: I have reviewed all pertinent imaging results/findings within the past 24 hours.    Scheduled Meds:   atorvastatin  80 mg Oral Daily    cefTRIAXone (Rocephin) IV (PEDS and ADULTS)  1 g Intravenous Q24H    doxycycline  100 mg Oral Q12H    isosorbide mononitrate  60 mg Oral QHS    polyethylene glycol  17 g Oral Daily    sodium chloride  1 spray Each Nostril Daily    traZODone  50 mg Oral QHS    warfarin  2.5 mg Oral Daily     Continuous Infusions:  PRN Meds:  Current Facility-Administered Medications:     albuterol-ipratropium, 3 mL, Nebulization, Q6H PRN    dextrose 50%, 12.5 g, Intravenous, PRN    dextrose 50%, 25 g, Intravenous, PRN    glucagon (human recombinant), 1 mg, Intramuscular, PRN    glucose, 16 g, Oral, PRN    glucose, 24 g, Oral, PRN     insulin aspart U-100, 0-5 Units, Subcutaneous, QID (AC + HS) PRN    naloxone, 0.02 mg, Intravenous, PRN    senna, 8.6 mg, Oral, Daily PRN    sodium chloride 0.9%, 10 mL, Intravenous, Q12H PRN    ASSESSMENT/PLAN:   Mr. Dariel Urbina is a 84 y.o. male with a relevant medical history of T2DM, CKD IV, Afib with mechanical aortic valve on Warfarin, combined CHF, recurrent epistaxis s/p multiple interventions who is being followed up for Acute blood loss anemia.    Active Hospital Problems    Diagnosis  POA    *Acute blood loss anemia [D62]  Yes    History of epistaxis [Z87.898]  Not Applicable    Right lower lobe pneumonia [J18.9]  Yes    H/O mechanical aortic valve replacement [Z95.2]  Not Applicable    Type 2 diabetes mellitus with stage 4 chronic kidney disease, without long-term current use of insulin [E11.22, N18.4]  Yes     Chronic    Chronic anticoagulation [Z79.01]  Not Applicable    Hyperlipidemia associated with type 2 diabetes mellitus [E11.69, E78.5]  Yes      Resolved Hospital Problems   No resolved problems to display.     Acute blood loss anemia  Anemia is likely due to acute blood loss which was from Epistaxis related to supratherapeutic INR on warfarin    Plan  - If pt has bleed again, afrin instructions per ENT.   - Monitor serial CBC: Every 12 hours  - ENT possibly performing intervention tomorrow  - Transfuse PRBC if patient becomes hemodynamically unstable, symptomatic or H/H drops below 7/21.    Right lower lobe pneumonia  Wheezes present on PE. Concerning for developing infection, at least post viral pneumonia. Not on O2.    Plan  -Continue ceftriaxone/doxy     History of epistaxis  Pt with chronic nosebleeds. Had multiple cauterizations in the past. ENT consulted.     - ENT consulted, possibly performing cauterization tomorrow        H/O mechanical aortic valve replacement  Resuming warfarin        Type 2 diabetes mellitus with stage 4 chronic kidney disease, without long-term current use of  insulin  Creatine stable for now. BMP reviewed- noted Estimated Creatinine Clearance: 20.2 mL/min (A) (based on SCr of 2.5 mg/dL (H)). according to latest data. 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  Monitor UOP and serial BMP and adjust therapy as needed. Renally dose meds. Avoid nephrotoxic medications and procedures.     Plan  - Low dose sliding scale  -Holding allopurinol     Hyperlipidemia associated with type 2 diabetes mellitus     Continue home statin     Chronic anticoagulation  Initially held warfarin due to supratherapeutic INR. Now subtherapeutic.      Plan  - Started 5 mg warfarin  - Monitor PT-INR      VTE Risk Mitigation (From admission, onward)           Ordered     warfarin (COUMADIN) tablet 2.5 mg  Daily         03/01/25 1417     Reason for No Pharmacological VTE Prophylaxis  Once        Question:  Reasons:  Answer:  Active Bleeding    02/24/25 1421     IP VTE HIGH RISK PATIENT  Once         02/24/25 1421     Place sequential compression device  Until discontinued         02/24/25 1421

## 2025-03-02 NOTE — SUBJECTIVE & OBJECTIVE
Interval History: Patient started to bleed from left and subsequently right nare around 2pm today, constant moderate oozing, not responding to pressure and afrin.     Medications:  Continuous Infusions:  Scheduled Meds:   atorvastatin  80 mg Oral Daily    cefTRIAXone (Rocephin) IV (PEDS and ADULTS)  1 g Intravenous Q24H    doxycycline  100 mg Oral Q12H    isosorbide mononitrate  60 mg Oral QHS    polyethylene glycol  17 g Oral Daily    sodium chloride  1 spray Each Nostril Daily    traZODone  50 mg Oral QHS    warfarin  2.5 mg Oral Daily     PRN Meds:  Current Facility-Administered Medications:     albuterol-ipratropium, 3 mL, Nebulization, Q6H PRN    dextrose 50%, 12.5 g, Intravenous, PRN    dextrose 50%, 25 g, Intravenous, PRN    glucagon (human recombinant), 1 mg, Intramuscular, PRN    glucose, 16 g, Oral, PRN    glucose, 24 g, Oral, PRN    insulin aspart U-100, 0-5 Units, Subcutaneous, QID (AC + HS) PRN    naloxone, 0.02 mg, Intravenous, PRN    senna, 8.6 mg, Oral, Daily PRN    sodium chloride 0.9%, 10 mL, Intravenous, Q12H PRN     Review of patient's allergies indicates:   Allergen Reactions    Lisinopril     Losartan      Intolerance- elevates potassium level     Objective:     Vital Signs (24h Range):  Temp:  [97.1 °F (36.2 °C)-98.7 °F (37.1 °C)] 97.6 °F (36.4 °C)  Pulse:  [54-80] 72  Resp:  [18-20] 18  SpO2:  [95 %-99 %] 99 %  BP: (122-169)/(58-70) 169/68       Lines/Drains/Airways       Peripheral Intravenous Line  Duration                  Peripheral IV - Single Lumen 02/26/25 1126 22 G Anterior;Left Hand 3 days                     Physical Exam   Slow active oozing from bilateral nares, appears to be coming from left septum behind perforation with trickling past septal perforation into R nasal vestibule, L merocel placed  Posterior oropharynx with slow trickle of blood, slowed after placement of left merocel  Anterior septal perforation  Normal WOB on room air     Significant Labs:  CBC:   Recent Labs    Lab 03/01/25  1604   WBC 4.31   RBC 2.77*   HGB 7.8*   HCT 25.7*      MCV 93   MCH 28.2   MCHC 30.4*       Significant Diagnostics:  I have reviewed all pertinent imaging results/findings within the past 24 hours.

## 2025-03-02 NOTE — ASSESSMENT & PLAN NOTE
84 y.o. with history of recurrent epistaxis, on Coumadin for heart valve, who is s/p bilateral SP ligation and bilateral embolization and multiple cauterizations by ENT. He presents due to epistaxis 2/24/25. He was hemostatic in the ER. Hgb was 6.7, s/p 1u pRBC in the ER. Admitted to  for observation    3/1  INR lowered but started bridge to heparin, patient started to bleed from left nares and did not respond to afrin and pressure. Slowed with left merocel placement    3/2  - Maintain left nasal packing  - Ok for diet today, no plans for surgical intervention today given cessation of bleeding. Can plan for possible surgical intervention later this week or sooner if he begins to bleed again    - Keep head of bed elevated  - Please call ENT with questions  - Remainder of care per primary team

## 2025-03-02 NOTE — ASSESSMENT & PLAN NOTE
Pt with chronic nosebleeds. Had multiple cauterizations in the past. ENT consulted. Holding anticoagulation.     - ENT consulted, appreciate recs  - Cauterization with ENT post-poned on 2/26 d/t supratherapeutic INR and developing PNA   - Cauterization on 3/3, planned.

## 2025-03-02 NOTE — PROGRESS NOTES
[facility] - [facility] Surg  Otorhinolaryngology-Head & Neck Surgery  Progress Note    Subjective:     Post-Op Info:  Procedure(s) (LRB):  CAUTERIZATION, NOSE, ENDOSCOPIC (N/A)   4 Days Post-Op  Hospital Day: 7     Interval History: Had some bleeding from right nares overnight, stopped as of this morning.     Medications:  Continuous Infusions:  Scheduled Meds:   atorvastatin  80 mg Oral Daily    cefTRIAXone (Rocephin) IV (PEDS and ADULTS)  1 g Intravenous Q24H    doxycycline  100 mg Oral Q12H    isosorbide mononitrate  60 mg Oral QHS    polyethylene glycol  17 g Oral Daily    sodium chloride  1 spray Each Nostril Daily    traZODone  50 mg Oral QHS    warfarin  2.5 mg Oral Daily     PRN Meds:  Current Facility-Administered Medications:     albuterol-ipratropium, 3 mL, Nebulization, Q6H PRN    dextrose 50%, 12.5 g, Intravenous, PRN    dextrose 50%, 25 g, Intravenous, PRN    glucagon (human recombinant), 1 mg, Intramuscular, PRN    glucose, 16 g, Oral, PRN    glucose, 24 g, Oral, PRN    insulin aspart U-100, 0-5 Units, Subcutaneous, QID (AC + HS) PRN    naloxone, 0.02 mg, Intravenous, PRN    senna, 8.6 mg, Oral, Daily PRN    sodium chloride 0.9%, 10 mL, Intravenous, Q12H PRN     Review of patient's allergies indicates:   Allergen Reactions    Lisinopril     Losartan      Intolerance- elevates potassium level     Objective:     Vital Signs (24h Range):  Temp:  [97.3 °F (36.3 °C)-98.5 °F (36.9 °C)] 97.6 °F (36.4 °C)  Pulse:  [62-75] 66  Resp:  [17-18] 18  SpO2:  [95 %-100 %] 100 %  BP: (139-169)/(63-68) 139/63       Lines/Drains/Airways       Peripheral Intravenous Line  Duration                  Peripheral IV - Single Lumen 03/02/25 0536 20 G Anterior;Proximal;Right Forearm <1 day                     Physical Exam   NAD  Merocel left nares  Dried blood right anterior nares  Anterior septal perforation    Significant Labs:  CBC:   Recent Labs   Lab 03/02/25  0950   WBC 4.84   RBC 2.71*   HGB 7.7*   HCT 25.6*      MCV 95    [facility] 28.4   [facility] 30.1*       Significant Diagnostics:  I have reviewed all pertinent imaging results/findings within the past 24 hours.  Assessment/Plan:     History of epistaxis  84 y.o. with history of recurrent epistaxis, on Coumadin for heart valve, who is s/p bilateral SP ligation and bilateral embolization and multiple cauterizations by ENT. He presents due to epistaxis 2/24/25. He was hemostatic in the ER. Hgb was 6.7, s/p 1u pRBC in the ER. Admitted to  for observation    3/1  INR lowered but started bridge to heparin, patient started to bleed from left nares and did not respond to afrin and pressure. Slowed with left merocel placement    3/2  - Maintain left nasal packing  - Ok for diet today, no plans for surgical intervention today given cessation of bleeding. Can plan for possible surgical intervention later this week or sooner if he begins to bleed again    - Keep head of bed elevated  - Please call ENT with questions  - Remainder of care per primary team              Abdi Morejon MD  Otorhinolaryngology-Head & Neck Surgery  [facility] Surg

## 2025-03-02 NOTE — PROGRESS NOTES
[facility] Medicine  Progress Note    Patient Name: Dariel Urbina  MRN: [id number]  Patient Class: IP- Inpatient   Admission Date: 2/24/2025  Length of Stay: 5 days  Attending Physician: Nikkie Monsivais MD  Primary Care Provider: Devon Langston Jr., MD        Subjective     Principal Problem:Acute blood loss anemia        HPI:  84 y.o. male with  paroxysmal afib, mechanical aortic valve replacement on Coumadin, CHF, CKD stage 3, T2DM, HTN, recurrent epistaxis s/p multiple cauterizations who is admitted to hospital medicine for acute blood loss anemia 2/2 to profound epistaxis.     Pt noted to have intermittent nosebleeds since prior cauterization which resolve with pressure and afrin. Usually has 1-3 epistaxis episodes a day that last for 1-2 hours. But this past week they have worsened in severity and have been more difficult to control with pressure and Afrin. Today morning 2 AM, he had a large episode of epistaxis that wouldn't resolve with Afrin and pressure till about 11:30 AM prior to arrival at ED. Denies any trauma to the area or any specific triggering events. He reports this past week he has had cough, sore throat, and congestion. He denies any fatigue, dizziness, chest pain, or abdominal pain.     ED Course: XR chest unremarkable, Hgb 6.7, Hct 22.2 - 1 unit pRBC given    Overview/Hospital Course:  84 y.o. male with  paroxysmal afib, mechanical aortic valve replacement on Coumadin, CHF, CKD stage 3, T2DM, HTN, recurrent epistaxis s/p multiple cauterizations who is admitted to hospital medicine for acute blood loss anemia 2/2 to profound epistaxis. On admission, HgB- 6.7, received 1 unit of pRBCs. Repeat HgB s/p transfusion is 7.2. PT-INR goal per coumadin clinic is 2.2.5. Has wheezes on physical exam which is new. Covid/Flu/RSV panel unremarkable CXR concerning for possible infectious process. ENT planned for cauterization on 2/26, however pt wheezing worsened and it was decided to  postpone surgery. Started on Ceftriaxone/Doxy for CAP.  Monitoring Pt-INR. Started on Warfarin 5 mg but PT-INR subtherapeutic now. Started on Heparin to bridge, but pt experienced another episode of epistaxis on 3/1. ENT saw pt and opted for cauterization of 3/3/25.     Interval History: Pt had episode of epistaxis on 3/1 around 2 PM that resolved around 6 AM today. Heparin bridge discontinued. INR subtherapeutic. Plan for cauterization on 3/3.     Review of Systems  Objective:     Vital Signs (Most Recent):  Temp: 97.3 °F (36.3 °C) (03/02/25 0730)  Pulse: 65 (03/02/25 0730)  Resp: 18 (03/02/25 0730)  BP: (!) 155/67 (03/02/25 0730)  SpO2: 98 % (03/02/25 0730) Vital Signs (24h Range):  Temp:  [97.3 °F (36.3 °C)-98.5 °F (36.9 °C)] 97.3 °F (36.3 °C)  Pulse:  [62-75] 65  Resp:  [17-20] 18  SpO2:  [95 %-99 %] 98 %  BP: (145-169)/(63-68) 155/67     Weight: 65.8 kg (145 lb)  Body mass index is 23.4 kg/m².  No intake or output data in the 24 hours ending 03/02/25 1047      Physical Exam  HENT:      Nose:      Right Nostril: No epistaxis.      Left Nostril: No epistaxis.      Comments: Dry blood present in nostrils bilaterally. No active bleeding  Eyes:      Comments: Pale palpebra   Cardiovascular:      Rate and Rhythm: Normal rate.      Heart sounds: Murmur heard.   Pulmonary:      Breath sounds: Rhonchi present.   Neurological:      Mental Status: He is alert and oriented to person, place, and time. Mental status is at baseline.      Cranial Nerves: No cranial nerve deficit.             Significant Labs: All pertinent labs within the past 24 hours have been reviewed.    Significant Imaging: I have reviewed all pertinent imaging results/findings within the past 24 hours.    Assessment and Plan     * Acute blood loss anemia  Anemia is likely due to acute blood loss which was from Epistaxis related to warfarin . Most recent hemoglobin and hematocrit are listed below.  Recent Labs     02/25/25  0902 02/26/25  3972  02/27/25  0216   HGB 7.0* 6.6* 7.6*   HCT 22.9* 21.2* 25.0*       Plan  - If pt has bleed again, afrin instructions per ENT.   - Monitor serial CBC: Every 12 hours  - Transfuse PRBC if patient becomes hemodynamically unstable, symptomatic or H/H drops below 7/21.        Right lower lobe pneumonia  Wheezes present on PE. Concerning for developing infection, at least post viral pneumonia. Not on O2.    Plan  -Started on ceftriaxone/doxy      History of epistaxis  Pt with chronic nosebleeds. Had multiple cauterizations in the past. ENT consulted. Holding anticoagulation.     - ENT consulted, appreciate recs  - Cauterization with ENT post-poned on 2/26 d/t supratherapeutic INR and developing PNA   - Cauterization on 3/3, planned.      H/O mechanical aortic valve replacement  Resuming warfarin      Type 2 diabetes mellitus with stage 4 chronic kidney disease, without long-term current use of insulin  Creatine stable for now. BMP reviewed- noted Estimated Creatinine Clearance: 20.2 mL/min (A) (based on SCr of 2.5 mg/dL (H)). according to latest data. 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  Monitor UOP and serial BMP and adjust therapy as needed. Renally dose meds. Avoid nephrotoxic medications and procedures.    Plan  - Low dose sliding scale  -Holding allopurinol    Hyperlipidemia associated with type 2 diabetes mellitus    Continue home statin    Chronic anticoagulation  Initially held warfarin due to supratherapeutic INR. Now subtherapeutic.     Plan  - Started 5 mg warfarin, bridging with heparin.   - Monitor PT-INR        VTE Risk Mitigation (From admission, onward)           Ordered     warfarin (COUMADIN) tablet 2.5 mg  Daily         03/01/25 1417     Reason for No Pharmacological VTE Prophylaxis  Once        Question:  Reasons:  Answer:  Active Bleeding    02/24/25 1421     IP VTE HIGH RISK PATIENT  Once         02/24/25 1421     Place sequential compression device  Until discontinued          02/24/25 1421                    Discharge Planning   ASTER: 3/3/2025     Code Status: Full Code   Medical Readiness for Discharge Date:   Discharge Plan A: Home with family, Home   Discharge Delays: None known at this time                    Puma Arvizu MD  Department of Hospital Medicine   [facility]urg

## 2025-03-02 NOTE — SUBJECTIVE & OBJECTIVE
Interval History: Had some bleeding from right nares overnight, stopped as of this morning.     Medications:  Continuous Infusions:  Scheduled Meds:   atorvastatin  80 mg Oral Daily    cefTRIAXone (Rocephin) IV (PEDS and ADULTS)  1 g Intravenous Q24H    doxycycline  100 mg Oral Q12H    isosorbide mononitrate  60 mg Oral QHS    polyethylene glycol  17 g Oral Daily    sodium chloride  1 spray Each Nostril Daily    traZODone  50 mg Oral QHS    warfarin  2.5 mg Oral Daily     PRN Meds:  Current Facility-Administered Medications:     albuterol-ipratropium, 3 mL, Nebulization, Q6H PRN    dextrose 50%, 12.5 g, Intravenous, PRN    dextrose 50%, 25 g, Intravenous, PRN    glucagon (human recombinant), 1 mg, Intramuscular, PRN    glucose, 16 g, Oral, PRN    glucose, 24 g, Oral, PRN    insulin aspart U-100, 0-5 Units, Subcutaneous, QID (AC + HS) PRN    naloxone, 0.02 mg, Intravenous, PRN    senna, 8.6 mg, Oral, Daily PRN    sodium chloride 0.9%, 10 mL, Intravenous, Q12H PRN     Review of patient's allergies indicates:   Allergen Reactions    Lisinopril     Losartan      Intolerance- elevates potassium level     Objective:     Vital Signs (24h Range):  Temp:  [97.3 °F (36.3 °C)-98.5 °F (36.9 °C)] 97.6 °F (36.4 °C)  Pulse:  [62-75] 66  Resp:  [17-18] 18  SpO2:  [95 %-100 %] 100 %  BP: (139-169)/(63-68) 139/63       Lines/Drains/Airways       Peripheral Intravenous Line  Duration                  Peripheral IV - Single Lumen 03/02/25 0536 20 G Anterior;Proximal;Right Forearm <1 day                     Physical Exam   NAD  Merocel left nares  Dried blood right anterior nares  Anterior septal perforation    Significant Labs:  CBC:   Recent Labs   Lab 03/02/25  0950   WBC 4.84   RBC 2.71*   HGB 7.7*   HCT 25.6*      MCV 95   MCH 28.4   MCHC 30.1*       Significant Diagnostics:  I have reviewed all pertinent imaging results/findings within the past 24 hours.

## 2025-03-02 NOTE — ASSESSMENT & PLAN NOTE
84 y.o. with history of recurrent epistaxis, on Coumadin for heart valve, who is s/p bilateral SP ligation and bilateral embolization and multiple cauterizations by ENT. He presents due to epistaxis 2/24/25. He is currently hemostatic in the ER. Hgb was 6.7, s/p 1u pRBC in the ER. Admitted to  for observation    3/1  INR lowered but started bridge to heparin, patient started to bleed from left nares and did not respond to afrin and pressure. Slowed with left merocel placement    - Maintain left nasal packing  - Please have patient NPO at midnight, will check again in the morning and decide on timing of surgical intervention pending patient's bleeding status  - Keep head of bed elevated  - Please call ENT with questions  - Remainder of care per primary team

## 2025-03-02 NOTE — PROGRESS NOTES
Abdulkadir Duval - [facility] Surg  Otorhinolaryngology-Head & Neck Surgery  Progress Note    Subjective:     Post-Op Info:  Procedure(s) (LRB):  CAUTERIZATION, NOSE, ENDOSCOPIC (N/A)   3 Days Post-Op  Hospital Day: 6     Interval History: Patient started to bleed from left and subsequently right nare around 2pm today, constant moderate oozing, not responding to pressure and afrin.     Medications:  Continuous Infusions:  Scheduled Meds:   atorvastatin  80 mg Oral Daily    cefTRIAXone (Rocephin) IV (PEDS and ADULTS)  1 g Intravenous Q24H    doxycycline  100 mg Oral Q12H    isosorbide mononitrate  60 mg Oral QHS    polyethylene glycol  17 g Oral Daily    sodium chloride  1 spray Each Nostril Daily    traZODone  50 mg Oral QHS    warfarin  2.5 mg Oral Daily     PRN Meds:  Current Facility-Administered Medications:     albuterol-ipratropium, 3 mL, Nebulization, Q6H PRN    dextrose 50%, 12.5 g, Intravenous, PRN    dextrose 50%, 25 g, Intravenous, PRN    glucagon (human recombinant), 1 mg, Intramuscular, PRN    glucose, 16 g, Oral, PRN    glucose, 24 g, Oral, PRN    insulin aspart U-100, 0-5 Units, Subcutaneous, QID (AC + HS) PRN    naloxone, 0.02 mg, Intravenous, PRN    senna, 8.6 mg, Oral, Daily PRN    sodium chloride 0.9%, 10 mL, Intravenous, Q12H PRN     Review of patient's allergies indicates:   Allergen Reactions    Lisinopril     Losartan      Intolerance- elevates potassium level     Objective:     Vital Signs (24h Range):  Temp:  [97.1 °F (36.2 °C)-98.7 °F (37.1 °C)] 97.6 °F (36.4 °C)  Pulse:  [54-80] 72  Resp:  [18-20] 18  SpO2:  [95 %-99 %] 99 %  BP: (122-169)/(58-70) 169/68       Lines/Drains/Airways       Peripheral Intravenous Line  Duration                  Peripheral IV - Single Lumen 02/26/25 1126 22 G Anterior;Left Hand 3 days                     Physical Exam   Slow active oozing from bilateral nares, appears to be coming from left septum behind perforation with trickling past septal perforation into R nasal  vestibule, L merocel placed  Posterior oropharynx with slow trickle of blood, slowed after placement of left merocel  Anterior septal perforation  Normal WOB on room air     Significant Labs:  CBC:   Recent Labs   Lab 03/01/25  1604   WBC 4.31   RBC 2.77*   HGB 7.8*   HCT 25.7*      MCV 93   MCH 28.2   MCHC 30.4*       Significant Diagnostics:  I have reviewed all pertinent imaging results/findings within the past 24 hours.  Assessment/Plan:     History of epistaxis  84 y.o. with history of recurrent epistaxis, on Coumadin for heart valve, who is s/p bilateral SP ligation and bilateral embolization and multiple cauterizations by ENT. He presents due to epistaxis 2/24/25. He is currently hemostatic in the ER. Hgb was 6.7, s/p 1u pRBC in the ER. Admitted to  for observation    3/1  INR lowered but started bridge to heparin, patient started to bleed from left nares and did not respond to afrin and pressure. Slowed with left merocel placement    - Maintain left nasal packing  - Please have patient NPO at midnight, will check again in the morning and decide on timing of surgical intervention pending patient's bleeding status  - Keep head of bed elevated  - Please call ENT with questions  - Remainder of care per primary team              Abdi Morejon MD  Otorhinolaryngology-Head & Neck Surgery  [facility] Surg

## 2025-03-03 ENCOUNTER — ANESTHESIA EVENT (OUTPATIENT)
Dept: SURGERY | Facility: HOSPITAL | Age: 85
End: 2025-03-03
Payer: MEDICARE

## 2025-03-03 LAB
ALBUMIN SERPL BCP-MCNC: 2.7 G/DL (ref 3.5–5.2)
ALP SERPL-CCNC: 76 U/L (ref 40–150)
ALT SERPL W/O P-5'-P-CCNC: 8 U/L (ref 10–44)
ANION GAP SERPL CALC-SCNC: 5 MMOL/L (ref 8–16)
AST SERPL-CCNC: 16 U/L (ref 10–40)
BASOPHILS # BLD AUTO: 0.03 K/UL (ref 0–0.2)
BASOPHILS NFR BLD: 0.6 % (ref 0–1.9)
BILIRUB SERPL-MCNC: 0.4 MG/DL (ref 0.1–1)
BUN SERPL-MCNC: 31 MG/DL (ref 8–23)
CALCIUM SERPL-MCNC: 9.3 MG/DL (ref 8.7–10.5)
CHLORIDE SERPL-SCNC: 113 MMOL/L (ref 95–110)
CO2 SERPL-SCNC: 19 MMOL/L (ref 23–29)
CREAT SERPL-MCNC: 1.8 MG/DL (ref 0.5–1.4)
DIFFERENTIAL METHOD BLD: ABNORMAL
EOSINOPHIL # BLD AUTO: 0.2 K/UL (ref 0–0.5)
EOSINOPHIL NFR BLD: 3.9 % (ref 0–8)
ERYTHROCYTE [DISTWIDTH] IN BLOOD BY AUTOMATED COUNT: 15 % (ref 11.5–14.5)
EST. GFR  (NO RACE VARIABLE): 36.9 ML/MIN/1.73 M^2
GLUCOSE SERPL-MCNC: 66 MG/DL (ref 70–110)
HCT VFR BLD AUTO: 26.6 % (ref 40–54)
HGB BLD-MCNC: 8 G/DL (ref 14–18)
IMM GRANULOCYTES # BLD AUTO: 0.02 K/UL (ref 0–0.04)
IMM GRANULOCYTES NFR BLD AUTO: 0.4 % (ref 0–0.5)
INR PPP: 1.7 (ref 0.8–1.2)
LYMPHOCYTES # BLD AUTO: 0.8 K/UL (ref 1–4.8)
LYMPHOCYTES NFR BLD: 13.8 % (ref 18–48)
MAGNESIUM SERPL-MCNC: 1.7 MG/DL (ref 1.6–2.6)
MCH RBC QN AUTO: 28.6 PG (ref 27–31)
MCHC RBC AUTO-ENTMCNC: 30.1 G/DL (ref 32–36)
MCV RBC AUTO: 95 FL (ref 82–98)
MONOCYTES # BLD AUTO: 0.6 K/UL (ref 0.3–1)
MONOCYTES NFR BLD: 11 % (ref 4–15)
NEUTROPHILS # BLD AUTO: 3.8 K/UL (ref 1.8–7.7)
NEUTROPHILS NFR BLD: 70.3 % (ref 38–73)
NRBC BLD-RTO: 0 /100 WBC
PHOSPHATE SERPL-MCNC: 2.9 MG/DL (ref 2.7–4.5)
PLATELET # BLD AUTO: 186 K/UL (ref 150–450)
PMV BLD AUTO: 10 FL (ref 9.2–12.9)
POCT GLUCOSE: 100 MG/DL (ref 70–110)
POCT GLUCOSE: 119 MG/DL (ref 70–110)
POCT GLUCOSE: 76 MG/DL (ref 70–110)
POCT GLUCOSE: 91 MG/DL (ref 70–110)
POTASSIUM SERPL-SCNC: 4.2 MMOL/L (ref 3.5–5.1)
PROT SERPL-MCNC: 6.2 G/DL (ref 6–8.4)
PROTHROMBIN TIME: 17.9 SEC (ref 9–12.5)
RBC # BLD AUTO: 2.8 M/UL (ref 4.6–6.2)
SODIUM SERPL-SCNC: 137 MMOL/L (ref 136–145)
WBC # BLD AUTO: 5.45 K/UL (ref 3.9–12.7)

## 2025-03-03 PROCEDURE — 11000001 HC ACUTE MED/SURG PRIVATE ROOM: Mod: HCNC

## 2025-03-03 PROCEDURE — 25000003 PHARM REV CODE 250: Mod: HCNC | Performed by: STUDENT IN AN ORGANIZED HEALTH CARE EDUCATION/TRAINING PROGRAM

## 2025-03-03 PROCEDURE — 83735 ASSAY OF MAGNESIUM: CPT | Mod: HCNC

## 2025-03-03 PROCEDURE — 80053 COMPREHEN METABOLIC PANEL: CPT | Mod: HCNC

## 2025-03-03 PROCEDURE — 85025 COMPLETE CBC W/AUTO DIFF WBC: CPT | Mod: HCNC

## 2025-03-03 PROCEDURE — 36415 COLL VENOUS BLD VENIPUNCTURE: CPT | Mod: HCNC

## 2025-03-03 PROCEDURE — 84100 ASSAY OF PHOSPHORUS: CPT | Mod: HCNC

## 2025-03-03 PROCEDURE — 63600175 PHARM REV CODE 636 W HCPCS: Mod: HCNC

## 2025-03-03 PROCEDURE — 85610 PROTHROMBIN TIME: CPT | Mod: HCNC

## 2025-03-03 PROCEDURE — 25000003 PHARM REV CODE 250: Mod: HCNC

## 2025-03-03 RX ORDER — WARFARIN SODIUM 5 MG/1
5 TABLET ORAL DAILY
Status: DISCONTINUED | OUTPATIENT
Start: 2025-03-03 | End: 2025-03-05

## 2025-03-03 RX ORDER — GUAIFENESIN AND DEXTROMETHORPHAN HYDROBROMIDE 10; 100 MG/5ML; MG/5ML
5 SYRUP ORAL EVERY 4 HOURS PRN
Status: DISCONTINUED | OUTPATIENT
Start: 2025-03-03 | End: 2025-03-07 | Stop reason: HOSPADM

## 2025-03-03 RX ADMIN — DOXYCYCLINE HYCLATE 100 MG: 100 TABLET, COATED ORAL at 09:03

## 2025-03-03 RX ADMIN — NASAL 2 SPRAY: 6.5 SPRAY NASAL at 09:03

## 2025-03-03 RX ADMIN — GUAIFENESIN AND DEXTROMETHORPHAN 5 ML: 100; 10 SYRUP ORAL at 11:03

## 2025-03-03 RX ADMIN — TRAZODONE HYDROCHLORIDE 50 MG: 50 TABLET ORAL at 09:03

## 2025-03-03 RX ADMIN — ATORVASTATIN CALCIUM 80 MG: 40 TABLET, FILM COATED ORAL at 09:03

## 2025-03-03 RX ADMIN — GUAIFENESIN AND DEXTROMETHORPHAN 5 ML: 100; 10 SYRUP ORAL at 05:03

## 2025-03-03 RX ADMIN — CEFTRIAXONE 1 G: 1 INJECTION, POWDER, FOR SOLUTION INTRAMUSCULAR; INTRAVENOUS at 01:03

## 2025-03-03 RX ADMIN — ISOSORBIDE MONONITRATE 60 MG: 60 TABLET, EXTENDED RELEASE ORAL at 09:03

## 2025-03-03 RX ADMIN — WARFARIN SODIUM 5 MG: 5 TABLET ORAL at 04:03

## 2025-03-03 NOTE — ANESTHESIA PREPROCEDURE EVALUATION
[facility]  Anesthesia Pre-Operative Evaluation    Patient Name: Dariel Urbina  YOB: 1940  MRN: [id number]    SUBJECTIVE:   03/03/2025    Pre-operative evaluation for Procedure(s) (LRB):  CAUTERIZATION, NOSE, ENDOSCOPIC (Bilateral)    Dariel Urbina is a 84 y.o. male with a PMHx significant for HTN, T2DM, CKD4, GERD, recent RLL pneumonia, pAF, CAD, CHF (EF 40-45%), aortic valve replacement on warfarin and recurrent epistaxis causing acute blood loss anemia . Patient now presents for the above procedure(s).    Previous Airway  Mask Ventilation: Easy mask  Attempts: 1  Attempted By: Resident anesthesiologist  Method of Intubation: Video laryngoscopy  Blade: Osorio 3  Laryngeal View Grade: Grade I - full view of cords     LDA:        Peripheral IV - Single Lumen 03/02/25 0536 20 G Anterior;Proximal;Right Forearm (Active)   Site Assessment Clean;Dry;Intact 03/03/25 0400   Line Securement Device Secured with sutureless device 03/02/25 2000   Extremity Assessment Distal to IV No abnormal discoloration 03/02/25 2000   Line Status Saline locked;Flushed 03/03/25 0400   Dressing Status Clean;Dry;Intact 03/03/25 0400   Dressing Intervention Integrity maintained 03/03/25 0400   Dressing Change Due 03/06/25 03/02/25 2000   Site Change Due 03/06/25 03/02/25 2000   Reason Not Rotated Not due 03/02/25 2000   Number of days: 1       Transthoracic Echo :  Results for orders placed during the hospital encounter of 09/26/24    Echo    Interpretation Summary    Left Ventricle: The left ventricle is normal in size. Mildly increased wall thickness. Mild septal thickening. There is concentric remodeling. Regional wall motion abnormalities present. Septal motion is abnormal. There is mildly reduced systolic function with a visually estimated ejection fraction of 40 - 45%. Ejection fraction by visual approximation is 43%. Grade III diastolic dysfunction.    Right Ventricle: Mild right  ventricular enlargement. Wall thickness is normal. Systolic function is borderline low.    Left Atrium: Left atrium is severely dilated.    Right Atrium: Right atrium is moderately dilated.    Aortic Valve: There is a mechanical valve in the aortic position. There is moderate stenosis. Aortic valve area by VTI is 1.30 cm2. Aortic valve peak velocity is 2.84 m/s. Mean gradient is 20 mmHg. The dimensionless index is 0.50.    Mitral Valve: There is moderate bileaflet sclerosis. There is mild mitral annular calcification present. There is moderate to severe regurgitation.    Tricuspid Valve: There is moderate to severe regurgitation.    Pulmonic Valve: There is mild regurgitation.    Pulmonary Artery: There is severe pulmonary hypertension. The estimated pulmonary artery systolic pressure is 79 mmHg.    IVC/SVC: Intermediate venous pressure at 8 mmHg.      Problem List[1]    Review of patient's allergies indicates:   Allergen Reactions    Lisinopril     Losartan      Intolerance- elevates potassium level       Current Outpatient Medications   Medication Instructions    acetaminophen (TYLENOL) 500 mg, Daily PRN    albuterol (VENTOLIN HFA) 90 mcg/actuation inhaler 2 puffs, Inhalation, Every 6 hours PRN, Rescue    allopurinoL (ZYLOPRIM) 100 mg, Oral, Daily    bumetanide (BUMEX) 2 mg, Oral, Every other day    ferrous gluconate (FERGON) 324 MG tablet TAKE 1 TABLET EVERY DAY WITH BREAKFAST    isosorbide mononitrate (IMDUR) 60 mg, Oral, Nightly    omega-3 fatty acids/fish oil (FISH OIL-OMEGA-3 FATTY ACIDS) 300-1,000 mg capsule 1 capsule, Daily    oxymetazoline (AFRIN) 0.05 % nasal spray 2 sprays, Nasal, As needed (PRN)    rosuvastatin (CRESTOR) 40 MG Tab TAKE 1 TABLET EVERY EVENING.    sodium chloride (SALINE NASAL) 0.65 % nasal spray Use 2 sprays by Nasal route every 4 hours. Apply into nasal cavities daily with nightly application of vaseline/aquaphor to anterior nares. Keep head of bed elevated.    traZODone (DESYREL) 50  mg, Oral, Nightly, As needed    warfarin (COUMADIN) 5 MG tablet TAKE 1/2 TABLET (2.5MG) SATURDAY, THEN TAKE 1 TABLET (5MG) ALL OTHER DAYS OR AS INSTRUCTED BY COUMADIN CLINIC       Past Surgical History:   Procedure Laterality Date    BACK SURGERY      CARDIAC CATHETERIZATION      CARDIAC VALVE REPLACEMENT      CARDIAC VALVE SURGERY      CARPAL TUNNEL RELEASE Right 05/19/2020    Procedure: RELEASE, CARPAL TUNNEL;  Surgeon: Rupesh Norris Jr., MD;  Location: [facility];  Service: Plastics;  Laterality: Right;    CATARACT EXTRACTION Left 11/13/2022        COLON SURGERY      COLONOSCOPY N/A 03/31/2017    Procedure: COLONOSCOPY;  Surgeon: Bruno Raymond MD;  Location: [facility] ENDO (4TH FLR);  Service: Endoscopy;  Laterality: N/A;  Patient's wife requesting date.    COLONOSCOPY N/A 04/03/2018    Procedure: COLONOSCOPY;  Surgeon: Bonifacio Pelletier MD;  Location: [facility] ENDO (2ND FLR);  Service: Endoscopy;  Laterality: N/A;    COLONOSCOPY N/A 08/13/2018    Procedure: COLONOSCOPY;  Surgeon: Kam Barba MD;  Location: [facility] ENDO (2ND FLR);  Service: Endoscopy;  Laterality: N/A;  2nd floor: PA pressure 49; hx of moderate-severe valve disease     per Coumadin clinic-Patient can hold 5 days with lovenox bridge       ok to schedule per [facility]    CONTROL OF EPISTAXIS, POSTERIOR, USING NASAL PACKING OR CAUTERIZATION Bilateral 6/18/2024    Procedure: CONTROL OF EPISTAXIS, POSTERIOR, USING NASAL PACKING OR CAUTERIZATION;  Surgeon: Jono Russell MD;  [facility];  Service: ENT;  Laterality: Bilateral;    CONTROL OF EPISTAXIS, POSTERIOR, USING NASAL PACKING OR CAUTERIZATION Bilateral 8/8/2024    Procedure: CONTROL OF EPISTAXIS, POSTERIOR, USING NASAL PACKING OR CAUTERIZATION;  Surgeon: Jono Russell MD;  Location: [facility] OR 2ND FLR;  Service: ENT;  Laterality: Bilateral;  suction bovie, nasopore, endoscopes    CONTROL OF EPISTAXIS, POSTERIOR, USING NASAL PACKING OR CAUTERIZATION Bilateral 12/30/2024     Procedure: CONTROL OF EPISTAXIS, POSTERIOR, USING NASAL PACKING OR CAUTERIZATION;  Surgeon: Jono Russell MD;  Location: [facility] OR 2ND FLR;  Service: ENT;  Laterality: Bilateral;    CORONARY ANGIOGRAPHY N/A 10/04/2021    Procedure: Left heart cath +/- peripheral angiogram;  Surgeon: Jose Ruiz MD;  Location: [facility] CATH LAB;  Service: Cardiology;  Laterality: N/A;    CORONARY ANGIOGRAPHY N/A 3/2/2023    Procedure: Angiogram, Coronary;  Surgeon: Devon Garnica MD;  Location: [facility] CATH LAB;  Service: Cardiology;  Laterality: N/A;    CORONARY ANGIOPLASTY      CORONARY ARTERY BYPASS GRAFT      CORONARY BYPASS GRAFT ANGIOGRAPHY  10/04/2021    Procedure: Bypass graft study;  Surgeon: Jose Ruiz MD;  Location: [facility] CATH LAB;  Service: Cardiology;;    CORONARY BYPASS GRAFT ANGIOGRAPHY  3/2/2023    Procedure: Bypass graft study;  Surgeon: Devon Garnica MD;  Location: [facility] CATH LAB;  Service: Cardiology;;    ECHOCARDIOGRAM,TRANSESOPHAGEAL N/A 4/14/2023    Procedure: Transesophageal echo (KIRSTEN) intra-procedure log documentation;  Surgeon: [facility] Diagnostic Provider;  Location: [facility] EP LAB;  Service: Cardiology;  Laterality: N/A;    ENDOSCOPIC NASAL CAUTERIZATION Bilateral 11/3/2023    Procedure: CAUTERIZATION, NOSE, ENDOSCOPIC;  Surgeon: Jono Russell MD;  Location: [facility] OR 2ND FLR;  Service: ENT;  Laterality: Bilateral;    ENDOSCOPIC NASAL CAUTERIZATION N/A 12/18/2023    Procedure: CAUTERIZATION, NOSE, ENDOSCOPIC;  Surgeon: Jono Russell MD;  Location: [facility] OR 2ND FLR;  Service: ENT;  Laterality: N/A;    ENDOSCOPIC NASAL CAUTERIZATION N/A 2/26/2025    Procedure: CAUTERIZATION, NOSE, ENDOSCOPIC;  Surgeon: Jono Russell MD;  Location: [facility] OR [facility] FLR;  Service: ENT;  Laterality: N/A;    EYE SURGERY      FESS, WITH IMAGING GUIDANCE Left 2/28/2024    Procedure: FESS, WITH IMAGING GUIDANCE;  Surgeon: Jono Russell MD;  Location: [facility] OR [facility] FLR;  Service: ENT;  Laterality: Left;  Scan loaded ENT  [employer]. CL    FLUOROSCOPY Bilateral 10/29/2024    Procedure: Fluoroscopy;  Surgeon: Sameer Espitia MD;  Location: [facility] CATH LAB/EP;  Service: Cardiology;  Laterality: Bilateral;  fluoroscopy of the mechanical aortic valve    FUNCTIONAL ENDOSCOPIC SINUS SURGERY (FESS) Bilateral 6/14/2023    Procedure: FESS (FUNCTIONAL ENDOSCOPIC SINUS SURGERY);  Surgeon: Jono Russell MD;  Location: [facility] OR 2ND FLR;  Service: ENT;  Laterality: Bilateral;    HERNIA REPAIR      INTRAOCULAR PROSTHESES INSERTION Left 11/13/2022    Procedure: INSERTION, IOL PROSTHESIS;  Surgeon: Alia Mckeon MD;  Location: [facility] OR 1ST FLR;  Service: Ophthalmology;  Laterality: Left;    INTRAOCULAR PROSTHESES INSERTION Right 12/04/2022    Procedure: INSERTION, IOL PROSTHESIS;  Surgeon: Alia Mckeon MD;  Location: [facility] OR 1ST FLR;  Service: Ophthalmology;  Laterality: Right;    LIGATION, ARTERY, ETHMOIDAL Left 2/28/2024    Procedure: LIGATION, ARTERY, ETHMOIDAL;  Surgeon: Jono Russell MD;  Location: [facility] OR 2ND FLR;  Service: ENT;  Laterality: Left;    LIGATION, ARTERY, SPHENOPALATINE, ENDOSCOPIC Bilateral 6/14/2023    Procedure: LIGATION, ARTERY, SPHENOPALATINE, ENDOSCOPIC;  Surgeon: Jono Russell MD;  Location: [facility] OR 2ND FLR;  Service: ENT;  Laterality: Bilateral;    NASAL ENDOSCOPY N/A 8/8/2024    Procedure: ENDOSCOPY, NOSE;  Surgeon: Jono Russell MD;  Location: [facility] OR 2ND FLR;  Service: ENT;  Laterality: N/A;    PHACOEMULSIFICATION OF CATARACT Left 11/13/2022    Procedure: PHACOEMULSIFICATION, CATARACT;  Surgeon: Alia Mckeon MD;  Location: [facility] OR 1ST FLR;  Service: Ophthalmology;  Laterality: Left;    PHACOEMULSIFICATION OF CATARACT Right 12/04/2022    Procedure: PHACOEMULSIFICATION, CATARACT;  Surgeon: Alia Mckeon MD;  Location: [facility] OR 1ST FLR;  Service: Ophthalmology;  Laterality: Right;    SPINE SURGERY      TRANSESOPHAGEAL ECHOCARDIOGRAPHY N/A 3/22/2023    Procedure: ECHOCARDIOGRAM, TRANSESOPHAGEAL;   "Surgeon: [facility] Diagnostic Provider;  Location: [facility] EP LAB;  Service: Anesthesiology;  Laterality: N/A;    TRANSESOPHAGEAL ECHOCARDIOGRAPHY N/A 4/11/2023    Procedure: ECHOCARDIOGRAM, TRANSESOPHAGEAL;  Surgeon: [facility] Diagnostic Provider;  Location: [facility] EP LAB;  Service: Anesthesiology;  Laterality: N/A;    VASECTOMY         Social History     Substance and Sexual Activity   Drug Use No     Alcohol Use: Not At Risk (2/25/2025)    AUDIT-C     Frequency of Alcohol Consumption: Never     Average Number of Drinks: Patient does not drink     Frequency of Binge Drinking: Never     Tobacco Use: Medium Risk (2/26/2025)    Patient History     Smoking Tobacco Use: Former     Smokeless Tobacco Use: Never     Passive Exposure: Never       OBJECTIVE:     Vital Signs Range:      1/10/2025    10:05 AM 2/24/2025    10:35 AM 2/24/2025    11:05 AM   Vitals - 1 value per visit   SYSTOLIC 140 131    DIASTOLIC 56 55    Pulse  79    Temp  36.6 °C (97.9 °F)    Resp  20    SPO2   100 %   Weight (lb) 152.78 145    Weight (kg) 69.3 65.772    Height 5' 6" (1.676 m) 5' 6" (1.676 m)    BMI (Calculated) 24.7 23.4    Pain Score Zero           CBC:   Lab Results   Component Value Date    WBC 5.45 03/03/2025    HGB 8.0 (L) 03/03/2025    HCT 26.6 (L) 03/03/2025    MCV 95 03/03/2025     03/03/2025         CMP:   Sodium   Date Value Ref Range Status   03/03/2025 137 136 - 145 mmol/L Final     Potassium   Date Value Ref Range Status   03/03/2025 4.2 3.5 - 5.1 mmol/L Final     Chloride   Date Value Ref Range Status   03/03/2025 113 (H) 95 - 110 mmol/L Final     CO2   Date Value Ref Range Status   03/03/2025 19 (L) 23 - 29 mmol/L Final     Glucose   Date Value Ref Range Status   03/03/2025 66 (L) 70 - 110 mg/dL Final     BUN   Date Value Ref Range Status   03/03/2025 31 (H) 8 - 23 mg/dL Final     Creatinine   Date Value Ref Range Status   03/03/2025 1.8 (H) 0.5 - 1.4 mg/dL Final     Calcium   Date Value Ref Range Status   03/03/2025 9.3 8.7 - 10.5 " mg/dL Final     Total Protein   Date Value Ref Range Status   03/03/2025 6.2 6.0 - 8.4 g/dL Final     Albumin   Date Value Ref Range Status   03/03/2025 2.7 (L) 3.5 - 5.2 g/dL Final     Total Bilirubin   Date Value Ref Range Status   03/03/2025 0.4 0.1 - 1.0 mg/dL Final     Comment:     For infants and newborns, interpretation of results should be based  on gestational age, weight and in agreement with clinical  observations.    Premature Infant recommended reference ranges:  Up to 24 hours.............<8.0 mg/dL  Up to 48 hours............<12.0 mg/dL  3-5 days..................<15.0 mg/dL  6-29 days.................<15.0 mg/dL       Alkaline Phosphatase   Date Value Ref Range Status   03/03/2025 76 40 - 150 U/L Final     AST   Date Value Ref Range Status   03/03/2025 16 10 - 40 U/L Final     ALT   Date Value Ref Range Status   03/03/2025 8 (L) 10 - 44 U/L Final     Anion Gap   Date Value Ref Range Status   03/03/2025 5 (L) 8 - 16 mmol/L Final     eGFR if    Date Value Ref Range Status   06/01/2022 20.0 (A) >60 mL/min/1.73 m^2 Final     eGFR if non    Date Value Ref Range Status   06/01/2022 17.3 (A) >60 mL/min/1.73 m^2 Final     Comment:     Calculation used to obtain the estimated glomerular filtration  rate (eGFR) is the CKD-EPI equation.          INR:  Lab Results   Component Value Date    INR 1.7 (H) 03/03/2025    INR 1.7 (H) 03/02/2025    INR 1.7 (H) 03/01/2025       Cardiac Studies    EKG:   Results for orders placed or performed during the hospital encounter of 02/24/25   EKG 12-lead    Collection Time: 02/26/25 12:55 PM   Result Value Ref Range    QRS Duration 156 ms    [facility] QTC Calculation 506 ms    Narrative    Test Reason : R94.31,    Vent. Rate :  73 BPM     Atrial Rate :    BPM     P-R Int :    ms          QRS Dur : 156 ms      QT Int : 460 ms       P-R-T Axes :    -11 128 degrees    QTcB Int : 506 ms    Atrial fibrillation  Left bundle branch block  Abnormal ECG  When  compared with ECG of 13-Dec-2024 12:30,  Left bundle branch block has replaced Nonspecific intraventricular block  Confirmed by Gareth Armas ([id number]) on 2/26/2025 1:07:59 PM    Referred By: AAAREFERRAL SELF           Confirmed By: Gareth Armas       Transthoracic Echo:  Results for orders placed during the hospital encounter of 09/26/24    Echo    Interpretation Summary    Left Ventricle: The left ventricle is normal in size. Mildly increased wall thickness. Mild septal thickening. There is concentric remodeling. Regional wall motion abnormalities present. Septal motion is abnormal. There is mildly reduced systolic function with a visually estimated ejection fraction of 40 - 45%. Ejection fraction by visual approximation is 43%. Grade III diastolic dysfunction.    Right Ventricle: Mild right ventricular enlargement. Wall thickness is normal. Systolic function is borderline low.    Left Atrium: Left atrium is severely dilated.    Right Atrium: Right atrium is moderately dilated.    Aortic Valve: There is a mechanical valve in the aortic position. There is moderate stenosis. Aortic valve area by VTI is 1.30 cm2. Aortic valve peak velocity is 2.84 m/s. Mean gradient is 20 mmHg. The dimensionless index is 0.50.    Mitral Valve: There is moderate bileaflet sclerosis. There is mild mitral annular calcification present. There is moderate to severe regurgitation.    Tricuspid Valve: There is moderate to severe regurgitation.    Pulmonic Valve: There is mild regurgitation.    Pulmonary Artery: There is severe pulmonary hypertension. The estimated pulmonary artery systolic pressure is 79 mmHg.    IVC/SVC: Intermediate venous pressure at 8 mmHg.      Transesophageal Echo:  Results for orders placed during the hospital encounter of 04/11/23    Transesophageal echo (KIRSTEN)    Interpretation Summary  · The left ventricle is normal in size with normal systolic function.The estimated ejection fraction is 60%.  · Normal right ventricular  size with normal right ventricular systolic function.  · Moderate mitral regurgitation.  · There is a mechanical aortic valve present. There is no aortic insufficiency present.  · Plaque present in the transverse aorta.      Nuclear Stress Test:  No results found for this or any previous visit.      Stress Echo:  No results found for this or any previous visit.      Nuclear Stress Echo:  No results found for this or any previous visit.      Cardiac Catheterization:  Results for orders placed during the hospital encounter of 10/29/24    Cardiac catheterization    Conclusion    The estimated blood loss was none.    Indication for the procedure:  History of mechanical aortic valve with persistent bleeding on Coumadin.  Fluoroscopy of the valve was done to assess for mechanical valve morphology to adjust Coumadin dosing.      Description of the procedure:    The patient was brought to the cath lab and fluoroscopic views of the valve were taken in multiple cranial and caudal projections.  No IV access was achieved.  No invasive procedures done.    Findings:      Patient has a bileaflet mechanical aortic valve (likely [facility]) with normal leaflet mobility.        The procedure log was documented by Documenter: Fred Snyder RT and verified by Sameer Espitia MD.    Date: 10/29/2024  Time: 9:50 AM      Cardiac Device Check:  No results found for this or any previous visit.      ASSESSMENT/PLAN:                                                                                                                03/03/2025  Dariel Urbina is a 84 y.o., male.      Pre-op Assessment    I have reviewed the Patient Summary Reports.     I have reviewed the Nursing Notes. I have reviewed the NPO Status.   I have reviewed the Medications.     Review of Systems  Cardiovascular:     Hypertension   CAD       CHF                                   Pulmonary:  Pneumonia                      Renal/:  Chronic Renal Disease, CKD                 Hepatic/GI:     GERD                Endocrine:  Diabetes               Physical Exam  General: Well nourished, Cooperative and Alert    Airway:  Mallampati: III   Mouth Opening: Normal  TM Distance: Normal  Tongue: Normal  Neck ROM: Normal ROM    Dental:  Periodontal disease    Chest/Lungs:  Normal Respiratory Rate    Heart:  Rate: Normal        Anesthesia Plan  Type of Anesthesia, risks & benefits discussed:    Anesthesia Type: Gen ETT  Intra-op Monitoring Plan: Standard ASA Monitors  Post Op Pain Control Plan: multimodal analgesia  Induction:  IV  Airway Plan: Direct and Video  Informed Consent: Informed consent signed with the Patient and all parties understand the risks and agree with anesthesia plan.  All questions answered.   ASA Score: 3  Day of Surgery Review of History & Physical: H&P Update referred to the surgeon/provider.    Ready For Surgery From Anesthesia Perspective.     .           [1]   Patient Active Problem List  Diagnosis    Chronic anticoagulation    Hyperlipidemia associated with type 2 diabetes mellitus    History of colon resection    Renovascular hypertension    History of gout    Type 2 diabetes mellitus with stage 4 chronic kidney disease, without long-term current use of insulin    H/O mechanical aortic valve replacement    Atherosclerosis of native artery of extremity with intermittent claudication    Stage 3b chronic kidney disease    Type 2 diabetes mellitus with diabetic peripheral angiopathy without gangrene    Hyperkalemia    Epistaxis    Gastrointestinal hemorrhage associated with intestinal diverticulosis    Hx of colonic polyp    Hypertension associated with diabetes    Bilateral carpal tunnel syndrome    Numbness and tingling in both hands    Severe carpal tunnel syndrome of right wrist    Anemia    Acute blood loss anemia    Recurrent epistaxis    Supratherapeutic INR    Coronary artery disease involving native coronary artery of native heart without angina pectoris     Acute exacerbation of CHF (congestive heart failure)    Paroxysmal atrial fibrillation    Mitral insufficiency    Secondary hyperparathyroidism of renal origin    ACP (advance care planning)    Chronic kidney disease, stage 4 (severe)    Anemia of chronic renal failure    Hyperuricemia    Nephrolithiasis    History of epistaxis    Right lower lobe pneumonia

## 2025-03-03 NOTE — ASSESSMENT & PLAN NOTE
Pt with chronic nosebleeds. Had multiple cauterizations in the past. ENT consulted. On warfarin. Has nasal packing due to rebleeds.     - ENT consulted, appreciate recs  - Cauterization with ENT post-poned on 2/26 d/t supratherapeutic INR and developing PNA   - Cauterization on 3/5, planned.  - On Doxy for MRSA coverage

## 2025-03-03 NOTE — PLAN OF CARE
[facility]  Discharge Reassessment    Primary Care Provider: Devon Langston Jr., MD    Expected Discharge Date: 3/5/2025    Reassessment (most recent)       Discharge Reassessment - 03/03/25 1758          Discharge Reassessment    Assessment Type Discharge Planning Reassessment (P)      Did the patient's condition or plan change since previous assessment? Yes (P)      Discharge Plan discussed with: Patient (P)      Name(s) and Number(s) Ameena Urbina (spouse) [phone] (P)      Communicated ASTER with patient/caregiver Yes (P)      Discharge Plan A Home;Home with family (P)      Discharge Plan B Home;Home with family (P)      DME Needed Upon Discharge  none (P)      Transition of Care Barriers None (P)      Why the patient remains in the hospital Requires continued medical care (P)         Post-Acute Status    Coverage [employer] (P)      Discharge Delays None known at this time (P)                      CM spoke with pt and spouse at bedside, not medically ready will need procedure scheduled for 3/5. Pt is independent with no needs at this time.  Will cont to coordinate care until discharge.     Discharge Plan A and Plan B have been determined by review of patient's clinical status, future medical and therapeutic needs, and coverage/benefits for post-acute care in coordination with multidisciplinary team members.     Maria Del Carmen Singleton, MSN   [facility]  [phone]

## 2025-03-03 NOTE — PROGRESS NOTES
[facility] Medicine  Progress Note    Patient Name: Dariel Urbina  MRN: [id number]  Patient Class: IP- Inpatient   Admission Date: 2/24/2025  Length of Stay: 6 days  Attending Physician: Nikkie Monsivais MD  Primary Care Provider: Devon Langston Jr., MD        Subjective     Principal Problem:Acute blood loss anemia        HPI:  84 y.o. male with  paroxysmal afib, mechanical aortic valve replacement on Coumadin, CHF, CKD stage 3, T2DM, HTN, recurrent epistaxis s/p multiple cauterizations who is admitted to hospital medicine for acute blood loss anemia 2/2 to profound epistaxis.     Pt noted to have intermittent nosebleeds since prior cauterization which resolve with pressure and afrin. Usually has 1-3 epistaxis episodes a day that last for 1-2 hours. But this past week they have worsened in severity and have been more difficult to control with pressure and Afrin. Today morning 2 AM, he had a large episode of epistaxis that wouldn't resolve with Afrin and pressure till about 11:30 AM prior to arrival at ED. Denies any trauma to the area or any specific triggering events. He reports this past week he has had cough, sore throat, and congestion. He denies any fatigue, dizziness, chest pain, or abdominal pain.     ED Course: XR chest unremarkable, Hgb 6.7, Hct 22.2 - 1 unit pRBC given    Overview/Hospital Course:  84 y.o. male with  paroxysmal afib, mechanical aortic valve replacement on Coumadin, CHF, CKD stage 3, T2DM, HTN, recurrent epistaxis s/p multiple cauterizations who is admitted to hospital medicine for acute blood loss anemia 2/2 to profound epistaxis. On admission, HgB- 6.7, received 1 unit of pRBCs. Repeat HgB s/p transfusion is 7.2. PT-INR goal per coumadin clinic is 2.2.5. Has wheezes on physical exam which is new. Covid/Flu/RSV panel unremarkable CXR concerning for possible infectious process. ENT planned for cauterization on 2/26, however pt wheezing worsened and it was decided to  postpone surgery. Started on Ceftriaxone/Doxy for CAP.  Monitoring Pt-INR. Started on Warfarin 5 mg but PT-INR subtherapeutic now. Started on Heparin to bridge, but pt experienced another episode of epistaxis on 3/1. Heparin discontinued. Increased warfarin to 5 mg daily due to subtherapeutic INR. ENT saw pt and opted for cauterization of 3/5/25.     Interval History: INR subtherapeutic, increased warfarin to 5 mg daily. No bleeds overnight. Plan for cauterization on 3/5. Has nasal packing inside.     Review of Systems  Objective:     Vital Signs (Most Recent):  Temp: 98 °F (36.7 °C) (03/03/25 0747)  Pulse: 66 (03/03/25 0747)  Resp: 18 (03/03/25 0747)  BP: (!) 128/55 (03/03/25 0747)  SpO2: (!) 94 % (03/03/25 0747) Vital Signs (24h Range):  Temp:  [97.2 °F (36.2 °C)-98 °F (36.7 °C)] 98 °F (36.7 °C)  Pulse:  [59-73] 66  Resp:  [18] 18  SpO2:  [88 %-100 %] 94 %  BP: (115-156)/(55-72) 128/55     Weight: 65.8 kg (145 lb)  Body mass index is 23.4 kg/m².  No intake or output data in the 24 hours ending 03/03/25 1107      Physical Exam  HENT:      Nose:      Right Nostril: No epistaxis.      Left Nostril: No epistaxis.      Comments: Nasal packing inside  Eyes:      Comments: Pale palpebra   Cardiovascular:      Rate and Rhythm: Normal rate.      Heart sounds: Murmur heard.   Pulmonary:      Breath sounds: Rhonchi present.   Neurological:      Mental Status: He is alert and oriented to person, place, and time. Mental status is at baseline.      Cranial Nerves: No cranial nerve deficit.             Significant Labs: All pertinent labs within the past 24 hours have been reviewed.    Significant Imaging: I have reviewed all pertinent imaging results/findings within the past 24 hours.    Assessment and Plan     * Acute blood loss anemia  Anemia is likely due to acute blood loss which was from Epistaxis related to warfarin . Most recent hemoglobin and hematocrit are listed below.  Recent Labs     03/01/25  1604 03/02/25  09  03/03/25  0523   HGB 7.8* 7.7* 8.0*   HCT 25.7* 25.6* 26.6*       Plan  - If pt has bleed again, afrin instructions per ENT.   - Monitor serial CBC: Every 12 hours  - Transfuse PRBC if patient becomes hemodynamically unstable, symptomatic or H/H drops below 7/21.        Right lower lobe pneumonia  Wheezes present on PE. Concerning for developing infection, at least post viral pneumonia. Not on O2.    Plan  -Started on ceftriaxone/doxy      History of epistaxis  Pt with chronic nosebleeds. Had multiple cauterizations in the past. ENT consulted. On warfarin. Has nasal packing due to rebleeds.     - ENT consulted, appreciate recs  - Cauterization with ENT post-poned on 2/26 d/t supratherapeutic INR and developing PNA   - Cauterization on 3/5, planned.  - On Doxy for MRSA coverage      H/O mechanical aortic valve replacement  Resuming warfarin      Type 2 diabetes mellitus with stage 4 chronic kidney disease, without long-term current use of insulin  Creatine stable for now. BMP reviewed- noted Estimated Creatinine Clearance: 20.2 mL/min (A) (based on SCr of 2.5 mg/dL (H)). according to latest data. 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  Monitor UOP and serial BMP and adjust therapy as needed. Renally dose meds. Avoid nephrotoxic medications and procedures.    Plan  - Low dose sliding scale  -Holding allopurinol    Hyperlipidemia associated with type 2 diabetes mellitus    Continue home statin    Chronic anticoagulation  Initially held warfarin due to supratherapeutic INR. Now subtherapeutic.     Plan  - Started 5 mg warfarin, bridging with heparin.   - Monitor PT-INR        VTE Risk Mitigation (From admission, onward)           Ordered     warfarin (COUMADIN) tablet 5 mg  Daily         03/03/25 0925     Reason for No Pharmacological VTE Prophylaxis  Once        Question:  Reasons:  Answer:  Active Bleeding    02/24/25 1421     IP VTE HIGH RISK PATIENT  Once         02/24/25 1421     Place sequential  compression device  Until discontinued         02/24/25 1421                    Discharge Planning   ASTER: 3/5/2025     Code Status: Full Code   Medical Readiness for Discharge Date:   Discharge Plan A: Home with family, Home   Discharge Delays: None known at this time                    Puma Arvizu MD  Department of Hospital Medicine   [facility] Surg

## 2025-03-03 NOTE — PROGRESS NOTES
[facility]  Otorhinolaryngology-Head & Neck Surgery  Progress Note    Subjective:     Post-Op Info:  Procedure(s) (LRB):  CAUTERIZATION, NOSE, ENDOSCOPIC (N/A)   5 Days Post-Op  Hospital Day: 8     Interval History: Very minimal ooze from right nares overnight but overall improved compared to yesterday. Denies hemoptysis. Reports good sleep overnight. H/H improved.     Medications:  Continuous Infusions:  Scheduled Meds:   atorvastatin  80 mg Oral Daily    cefTRIAXone (Rocephin) IV (PEDS and ADULTS)  1 g Intravenous Q24H    doxycycline  100 mg Oral Q12H    isosorbide mononitrate  60 mg Oral QHS    polyethylene glycol  17 g Oral Daily    sodium chloride  1 spray Each Nostril Daily    traZODone  50 mg Oral QHS    warfarin  2.5 mg Oral Daily     PRN Meds:  Current Facility-Administered Medications:     albuterol-ipratropium, 3 mL, Nebulization, Q6H PRN    dextromethorphan-guaiFENesin  mg/5 ml, 5 mL, Oral, Q4H PRN    dextrose 50%, 12.5 g, Intravenous, PRN    dextrose 50%, 25 g, Intravenous, PRN    glucagon (human recombinant), 1 mg, Intramuscular, PRN    glucose, 16 g, Oral, PRN    glucose, 24 g, Oral, PRN    insulin aspart U-100, 0-5 Units, Subcutaneous, QID (AC + HS) PRN    naloxone, 0.02 mg, Intravenous, PRN    senna, 8.6 mg, Oral, Daily PRN    sodium chloride 0.9%, 10 mL, Intravenous, Q12H PRN     Review of patient's allergies indicates:   Allergen Reactions    Lisinopril     Losartan      Intolerance- elevates potassium level     Objective:     Vital Signs (24h Range):  Temp:  [97.2 °F (36.2 °C)-98 °F (36.7 °C)] 97.7 °F (36.5 °C)  Pulse:  [59-73] 59  Resp:  [18] 18  SpO2:  [88 %-100 %] 98 %  BP: (115-156)/(55-72) 131/59       Lines/Drains/Airways       Peripheral Intravenous Line  Duration                  Peripheral IV - Single Lumen 03/02/25 0536 20 G Anterior;Proximal;Right Forearm 1 day                     Physical Exam     NAD  Merocel left nares  Dried blood right anterior nares  Anterior  septal perforation  Oropharynx clear    Significant Labs:  CBC:   Recent Labs   Lab 03/03/25  0523   WBC 5.45   RBC 2.80*   HGB 8.0*   HCT 26.6*      MCV 95   MCH 28.6   MCHC 30.1*       Significant Diagnostics:  None  Assessment/Plan:     History of epistaxis  84 y.o. with history of recurrent epistaxis, on Coumadin for heart valve, who is s/p bilateral SP ligation and bilateral embolization and multiple cauterizations by ENT. He presents due to epistaxis 2/24/25. He was hemostatic in the ER. Hgb was 6.7, s/p 1u pRBC in the ER. Admitted to  for observation    - Patient hemostatic this morning   - ok for diet today   - NPO again at MN in case bleeds overnight  - Tentative plan for nasal endoscopy in OR Wednesday, sooner if bleeds again   - will consent  - saline spray  - abx while packing in place - anti staph coverage  - Keep head of bed elevated  - Please call ENT with questions  - Remainder of care per primary team              Kyle Lee MD  Otorhinolaryngology-Head & Neck Surgery  [facility] - [facility] S[facility]

## 2025-03-03 NOTE — ASSESSMENT & PLAN NOTE
84 y.o. with history of recurrent epistaxis, on Coumadin for heart valve, who is s/p bilateral SP ligation and bilateral embolization and multiple cauterizations by ENT. He presents due to epistaxis 2/24/25. He was hemostatic in the ER. Hgb was 6.7, s/p 1u pRBC in the ER. Admitted to  for observation    - Patient hemostatic this morning - ok for diet today  - Tentative plan for nasal endoscopy in OR Wednesday, sooner if bleeds again   - will place case request   - will consent  - saline spray  - abx while packing in place - anti staph coverage  - Keep head of bed elevated  - Please call ENT with questions  - Remainder of care per primary team

## 2025-03-03 NOTE — ASSESSMENT & PLAN NOTE
Anemia is likely due to acute blood loss which was from Epistaxis related to warfarin . Most recent hemoglobin and hematocrit are listed below.  Recent Labs     03/01/25  1604 03/02/25  0950 03/03/25  0523   HGB 7.8* 7.7* 8.0*   HCT 25.7* 25.6* 26.6*       Plan  - If pt has bleed again, afrin instructions per ENT.   - Monitor serial CBC: Every 12 hours  - Transfuse PRBC if patient becomes hemodynamically unstable, symptomatic or H/H drops below 7/21.

## 2025-03-03 NOTE — SUBJECTIVE & OBJECTIVE
Interval History: INR subtherapeutic, increased warfarin to 5 mg daily. No bleeds overnight. Plan for cauterization on 3/5. Has nasal packing inside.     Review of Systems  Objective:     Vital Signs (Most Recent):  Temp: 98 °F (36.7 °C) (03/03/25 0747)  Pulse: 66 (03/03/25 0747)  Resp: 18 (03/03/25 0747)  BP: (!) 128/55 (03/03/25 0747)  SpO2: (!) 94 % (03/03/25 0747) Vital Signs (24h Range):  Temp:  [97.2 °F (36.2 °C)-98 °F (36.7 °C)] 98 °F (36.7 °C)  Pulse:  [59-73] 66  Resp:  [18] 18  SpO2:  [88 %-100 %] 94 %  BP: (115-156)/(55-72) 128/55     Weight: 65.8 kg (145 lb)  Body mass index is 23.4 kg/m².  No intake or output data in the 24 hours ending 03/03/25 1107      Physical Exam  HENT:      Nose:      Right Nostril: No epistaxis.      Left Nostril: No epistaxis.      Comments: Nasal packing inside  Eyes:      Comments: Pale palpebra   Cardiovascular:      Rate and Rhythm: Normal rate.      Heart sounds: Murmur heard.   Pulmonary:      Breath sounds: Rhonchi present.   Neurological:      Mental Status: He is alert and oriented to person, place, and time. Mental status is at baseline.      Cranial Nerves: No cranial nerve deficit.             Significant Labs: All pertinent labs within the past 24 hours have been reviewed.    Significant Imaging: I have reviewed all pertinent imaging results/findings within the past 24 hours.

## 2025-03-03 NOTE — SUBJECTIVE & OBJECTIVE
Interval History: Very minimal ooze from right nares overnight but overall improved compared to yesterday. Denies hemoptysis. Reports good sleep overnight. H/H improved.     Medications:  Continuous Infusions:  Scheduled Meds:   atorvastatin  80 mg Oral Daily    cefTRIAXone (Rocephin) IV (PEDS and ADULTS)  1 g Intravenous Q24H    doxycycline  100 mg Oral Q12H    isosorbide mononitrate  60 mg Oral QHS    polyethylene glycol  17 g Oral Daily    sodium chloride  1 spray Each Nostril Daily    traZODone  50 mg Oral QHS    warfarin  2.5 mg Oral Daily     PRN Meds:  Current Facility-Administered Medications:     albuterol-ipratropium, 3 mL, Nebulization, Q6H PRN    dextromethorphan-guaiFENesin  mg/5 ml, 5 mL, Oral, Q4H PRN    dextrose 50%, 12.5 g, Intravenous, PRN    dextrose 50%, 25 g, Intravenous, PRN    glucagon (human recombinant), 1 mg, Intramuscular, PRN    glucose, 16 g, Oral, PRN    glucose, 24 g, Oral, PRN    insulin aspart U-100, 0-5 Units, Subcutaneous, QID (AC + HS) PRN    naloxone, 0.02 mg, Intravenous, PRN    senna, 8.6 mg, Oral, Daily PRN    sodium chloride 0.9%, 10 mL, Intravenous, Q12H PRN     Review of patient's allergies indicates:   Allergen Reactions    Lisinopril     Losartan      Intolerance- elevates potassium level     Objective:     Vital Signs (24h Range):  Temp:  [97.2 °F (36.2 °C)-98 °F (36.7 °C)] 97.7 °F (36.5 °C)  Pulse:  [59-73] 59  Resp:  [18] 18  SpO2:  [88 %-100 %] 98 %  BP: (115-156)/(55-72) 131/59       Lines/Drains/Airways       Peripheral Intravenous Line  Duration                  Peripheral IV - Single Lumen 03/02/25 0536 20 G Anterior;Proximal;Right Forearm 1 day                     Physical Exam     NAD  Merocel left nares  Dried blood right anterior nares  Anterior septal perforation  Oropharynx clear    Significant Labs:  CBC:   Recent Labs   Lab 03/03/25  0523   WBC 5.45   RBC 2.80*   HGB 8.0*   HCT 26.6*      MCV 95   MCH 28.6   MCHC 30.1*       Significant  Diagnostics:  None

## 2025-03-04 LAB
ALBUMIN SERPL BCP-MCNC: 2.8 G/DL (ref 3.5–5.2)
ALP SERPL-CCNC: 79 U/L (ref 40–150)
ALT SERPL W/O P-5'-P-CCNC: 8 U/L (ref 10–44)
ANION GAP SERPL CALC-SCNC: 8 MMOL/L (ref 8–16)
AST SERPL-CCNC: 21 U/L (ref 10–40)
BASOPHILS # BLD AUTO: 0.03 K/UL (ref 0–0.2)
BASOPHILS NFR BLD: 0.7 % (ref 0–1.9)
BILIRUB SERPL-MCNC: 0.3 MG/DL (ref 0.1–1)
BUN SERPL-MCNC: 33 MG/DL (ref 8–23)
CALCIUM SERPL-MCNC: 9.1 MG/DL (ref 8.7–10.5)
CHLORIDE SERPL-SCNC: 112 MMOL/L (ref 95–110)
CO2 SERPL-SCNC: 17 MMOL/L (ref 23–29)
CREAT SERPL-MCNC: 1.8 MG/DL (ref 0.5–1.4)
DIFFERENTIAL METHOD BLD: ABNORMAL
EOSINOPHIL # BLD AUTO: 0.2 K/UL (ref 0–0.5)
EOSINOPHIL NFR BLD: 4.4 % (ref 0–8)
ERYTHROCYTE [DISTWIDTH] IN BLOOD BY AUTOMATED COUNT: 15 % (ref 11.5–14.5)
EST. GFR  (NO RACE VARIABLE): 36.9 ML/MIN/1.73 M^2
GLUCOSE SERPL-MCNC: 74 MG/DL (ref 70–110)
HCT VFR BLD AUTO: 24.9 % (ref 40–54)
HGB BLD-MCNC: 7.5 G/DL (ref 14–18)
IMM GRANULOCYTES # BLD AUTO: 0.01 K/UL (ref 0–0.04)
IMM GRANULOCYTES NFR BLD AUTO: 0.2 % (ref 0–0.5)
INR PPP: 2.1 (ref 0.8–1.2)
LYMPHOCYTES # BLD AUTO: 0.9 K/UL (ref 1–4.8)
LYMPHOCYTES NFR BLD: 20.1 % (ref 18–48)
MAGNESIUM SERPL-MCNC: 1.7 MG/DL (ref 1.6–2.6)
MCH RBC QN AUTO: 28.4 PG (ref 27–31)
MCHC RBC AUTO-ENTMCNC: 30.1 G/DL (ref 32–36)
MCV RBC AUTO: 94 FL (ref 82–98)
MONOCYTES # BLD AUTO: 0.5 K/UL (ref 0.3–1)
MONOCYTES NFR BLD: 11.1 % (ref 4–15)
NEUTROPHILS # BLD AUTO: 2.9 K/UL (ref 1.8–7.7)
NEUTROPHILS NFR BLD: 63.5 % (ref 38–73)
NRBC BLD-RTO: 0 /100 WBC
PHOSPHATE SERPL-MCNC: 2.8 MG/DL (ref 2.7–4.5)
PLATELET # BLD AUTO: 188 K/UL (ref 150–450)
PMV BLD AUTO: 10.3 FL (ref 9.2–12.9)
POCT GLUCOSE: 124 MG/DL (ref 70–110)
POCT GLUCOSE: 73 MG/DL (ref 70–110)
POCT GLUCOSE: 80 MG/DL (ref 70–110)
POCT GLUCOSE: 95 MG/DL (ref 70–110)
POTASSIUM SERPL-SCNC: 4.5 MMOL/L (ref 3.5–5.1)
PROT SERPL-MCNC: 6.2 G/DL (ref 6–8.4)
PROTHROMBIN TIME: 21.9 SEC (ref 9–12.5)
RBC # BLD AUTO: 2.64 M/UL (ref 4.6–6.2)
SODIUM SERPL-SCNC: 137 MMOL/L (ref 136–145)
WBC # BLD AUTO: 4.58 K/UL (ref 3.9–12.7)

## 2025-03-04 PROCEDURE — 25000003 PHARM REV CODE 250: Mod: HCNC

## 2025-03-04 PROCEDURE — 11000001 HC ACUTE MED/SURG PRIVATE ROOM: Mod: HCNC

## 2025-03-04 PROCEDURE — 80053 COMPREHEN METABOLIC PANEL: CPT | Mod: HCNC

## 2025-03-04 PROCEDURE — 99222 1ST HOSP IP/OBS MODERATE 55: CPT | Mod: HCNC,,, | Performed by: STUDENT IN AN ORGANIZED HEALTH CARE EDUCATION/TRAINING PROGRAM

## 2025-03-04 PROCEDURE — 83735 ASSAY OF MAGNESIUM: CPT | Mod: HCNC

## 2025-03-04 PROCEDURE — 63600175 PHARM REV CODE 636 W HCPCS: Mod: HCNC

## 2025-03-04 PROCEDURE — 84100 ASSAY OF PHOSPHORUS: CPT | Mod: HCNC

## 2025-03-04 PROCEDURE — 85025 COMPLETE CBC W/AUTO DIFF WBC: CPT | Mod: HCNC

## 2025-03-04 PROCEDURE — 36415 COLL VENOUS BLD VENIPUNCTURE: CPT | Mod: HCNC

## 2025-03-04 PROCEDURE — 85610 PROTHROMBIN TIME: CPT | Mod: HCNC

## 2025-03-04 RX ADMIN — CEFTRIAXONE 1 G: 1 INJECTION, POWDER, FOR SOLUTION INTRAMUSCULAR; INTRAVENOUS at 11:03

## 2025-03-04 RX ADMIN — DOXYCYCLINE HYCLATE 100 MG: 100 TABLET, COATED ORAL at 09:03

## 2025-03-04 RX ADMIN — TRAZODONE HYDROCHLORIDE 50 MG: 50 TABLET ORAL at 09:03

## 2025-03-04 RX ADMIN — ATORVASTATIN CALCIUM 80 MG: 40 TABLET, FILM COATED ORAL at 09:03

## 2025-03-04 RX ADMIN — WARFARIN SODIUM 5 MG: 5 TABLET ORAL at 03:03

## 2025-03-04 RX ADMIN — GUAIFENESIN AND DEXTROMETHORPHAN 5 ML: 100; 10 SYRUP ORAL at 11:03

## 2025-03-04 RX ADMIN — ISOSORBIDE MONONITRATE 60 MG: 60 TABLET, EXTENDED RELEASE ORAL at 09:03

## 2025-03-04 NOTE — PROGRESS NOTES
[facility] Medicine  Progress Note    Patient Name: Dariel Urbina  MRN: [id number]  Patient Class: IP- Inpatient   Admission Date: 2/24/2025  Length of Stay: 7 days  Attending Physician: Nikkie Monsivais MD  Primary Care Provider: Devon Langston Jr., MD        Subjective     Principal Problem:Acute blood loss anemia        HPI:  84 y.o. male with  paroxysmal afib, mechanical aortic valve replacement on Coumadin, CHF, CKD stage 3, T2DM, HTN, recurrent epistaxis s/p multiple cauterizations who is admitted to hospital medicine for acute blood loss anemia 2/2 to profound epistaxis.     Pt noted to have intermittent nosebleeds since prior cauterization which resolve with pressure and afrin. Usually has 1-3 epistaxis episodes a day that last for 1-2 hours. But this past week they have worsened in severity and have been more difficult to control with pressure and Afrin. Today morning 2 AM, he had a large episode of epistaxis that wouldn't resolve with Afrin and pressure till about 11:30 AM prior to arrival at ED. Denies any trauma to the area or any specific triggering events. He reports this past week he has had cough, sore throat, and congestion. He denies any fatigue, dizziness, chest pain, or abdominal pain.     ED Course: XR chest unremarkable, Hgb 6.7, Hct 22.2 - 1 unit pRBC given    Overview/Hospital Course:  84 y.o. male with  paroxysmal afib, mechanical aortic valve replacement on Coumadin, CHF, CKD stage 3, T2DM, HTN, recurrent epistaxis s/p multiple cauterizations who is admitted to hospital medicine for acute blood loss anemia 2/2 to profound epistaxis. On admission, HgB- 6.7, received 1 unit of pRBCs. Repeat HgB s/p transfusion is 7.2. PT-INR goal per coumadin clinic is 2.2.5. Has wheezes on physical exam which is new. Covid/Flu/RSV panel unremarkable CXR concerning for possible infectious process. ENT planned for cauterization on 2/26, however pt wheezing worsened and it was decided to  postpone surgery. Started on Ceftriaxone/Doxy for CAP.  Monitoring Pt-INR. Started on Warfarin 5 mg but PT-INR subtherapeutic now. Started on Heparin to bridge, but pt experienced another episode of epistaxis on 3/1. Heparin discontinued. Increased warfarin to 5 mg daily due to subtherapeutic INR. ENT saw pt and opted for cauterization of 3/5/25.     Interval History: INR within goal at 2.1. No acute bleeds overnight. Robitussin PRN for cough. Plan for Cauterization tomorrow. NPO at MN.     Review of Systems  Objective:     Vital Signs (Most Recent):  Temp: 98.2 °F (36.8 °C) (03/04/25 1202)  Pulse: 61 (03/04/25 1202)  Resp: 18 (03/04/25 1202)  BP: 134/68 (03/04/25 1202)  SpO2: 99 % (03/04/25 1202) Vital Signs (24h Range):  Temp:  [97.1 °F (36.2 °C)-98.2 °F (36.8 °C)] 98.2 °F (36.8 °C)  Pulse:  [60-66] 61  Resp:  [16-18] 18  SpO2:  [91 %-99 %] 99 %  BP: (127-143)/(58-68) 134/68     Weight: 65.8 kg (145 lb)  Body mass index is 23.4 kg/m².  No intake or output data in the 24 hours ending 03/04/25 1211      Physical Exam  HENT:      Nose:      Right Nostril: No epistaxis.      Left Nostril: No epistaxis.      Comments: Nasal packing inside  Eyes:      Comments: Pale palpebra   Cardiovascular:      Rate and Rhythm: Normal rate.      Heart sounds: Murmur heard.   Pulmonary:      Effort: No respiratory distress.      Breath sounds: Rhonchi present.      Comments: Productive cough present  Neurological:      Mental Status: He is alert and oriented to person, place, and time. Mental status is at baseline.      Cranial Nerves: No cranial nerve deficit.             Significant Labs: All pertinent labs within the past 24 hours have been reviewed.    Significant Imaging: I have reviewed all pertinent imaging results/findings within the past 24 hours.    Assessment and Plan     * Acute blood loss anemia  Anemia is likely due to acute blood loss which was from Epistaxis related to warfarin . Most recent hemoglobin and hematocrit  are listed below.  Recent Labs     03/01/25  1604 03/02/25  0950 03/03/25  0523   HGB 7.8* 7.7* 8.0*   HCT 25.7* 25.6* 26.6*       Plan  - If pt has bleed again, afrin instructions per ENT.   - Monitor serial CBC: Every 12 hours  - Transfuse PRBC if patient becomes hemodynamically unstable, symptomatic or H/H drops below 7/21.        Right lower lobe pneumonia  Wheezes present on PE. Concerning for developing infection, at least post viral pneumonia. Not on O2.    Plan  -Started on ceftriaxone/doxy      History of epistaxis  Pt with chronic nosebleeds. Had multiple cauterizations in the past. ENT consulted. On warfarin. Has nasal packing due to rebleeds.     - ENT consulted, appreciate recs  - Cauterization with ENT post-poned on 2/26 d/t supratherapeutic INR and developing PNA   - Cauterization on 3/5, planned.  - On Doxy for MRSA coverage      H/O mechanical aortic valve replacement  Resuming warfarin      Type 2 diabetes mellitus with stage 4 chronic kidney disease, without long-term current use of insulin  Creatine stable for now. BMP reviewed- noted Estimated Creatinine Clearance: 20.2 mL/min (A) (based on SCr of 2.5 mg/dL (H)). according to latest data. 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  Monitor UOP and serial BMP and adjust therapy as needed. Renally dose meds. Avoid nephrotoxic medications and procedures.    Plan  - Low dose sliding scale  -Holding allopurinol    Hyperlipidemia associated with type 2 diabetes mellitus    Continue home statin    Chronic anticoagulation  Initially held warfarin due to supratherapeutic INR. Now subtherapeutic.     Plan  - Increased Warfarin to 5mg   - Monitor PT-INR        VTE Risk Mitigation (From admission, onward)           Ordered     warfarin (COUMADIN) tablet 5 mg  Daily         03/03/25 0944     Reason for No Pharmacological VTE Prophylaxis  Once        Question:  Reasons:  Answer:  Active Bleeding    02/24/25 1421     IP VTE HIGH RISK PATIENT  Once          02/24/25 1421     Place sequential compression device  Until discontinued         02/24/25 1421                    Discharge Planning   ASTER: 3/6/2025     Code Status: Full Code   Medical Readiness for Discharge Date:   Discharge Plan A: Home, Home with family   Discharge Delays: None known at this time                    Sushma Diggs MD  Department of Hospital Medicine   [facility] Surg

## 2025-03-04 NOTE — PROGRESS NOTES
[facility] - [facility]  Otorhinolaryngology-Head & Neck Surgery  Progress Note    Subjective:     Post-Op Info:  Procedure(s) (LRB):  CAUTERIZATION, NOSE, ENDOSCOPIC (N/A)   6 Days Post-Op  Hospital Day: 9     Interval History: No issues overnight. Hemostatic on exam this morning.     Medications:  Continuous Infusions:  Scheduled Meds:   atorvastatin  80 mg Oral Daily    cefTRIAXone (Rocephin) IV (PEDS and ADULTS)  1 g Intravenous Q24H    doxycycline  100 mg Oral Q12H    isosorbide mononitrate  60 mg Oral QHS    polyethylene glycol  17 g Oral Daily    sodium chloride  2 spray Each Nostril TID    traZODone  50 mg Oral QHS    warfarin  5 mg Oral Daily     PRN Meds:  Current Facility-Administered Medications:     albuterol-ipratropium, 3 mL, Nebulization, Q6H PRN    dextromethorphan-guaiFENesin  mg/5 ml, 5 mL, Oral, Q4H PRN    dextrose 50%, 12.5 g, Intravenous, PRN    dextrose 50%, 25 g, Intravenous, PRN    glucagon (human recombinant), 1 mg, Intramuscular, PRN    glucose, 16 g, Oral, PRN    glucose, 24 g, Oral, PRN    insulin aspart U-100, 0-5 Units, Subcutaneous, QID (AC + HS) PRN    naloxone, 0.02 mg, Intravenous, PRN    senna, 8.6 mg, Oral, Daily PRN    sodium chloride 0.9%, 10 mL, Intravenous, Q12H PRN     Review of patient's allergies indicates:   Allergen Reactions    Lisinopril     Losartan      Intolerance- elevates potassium level     Objective:     Vital Signs (24h Range):  Temp:  [97.1 °F (36.2 °C)-97.7 °F (36.5 °C)] 97.7 °F (36.5 °C)  Pulse:  [60-66] 66  Resp:  [16-18] 16  SpO2:  [91 %-97 %] 96 %  BP: (127-143)/(58-66) 127/58       Lines/Drains/Airways       Peripheral Intravenous Line  Duration                  Peripheral IV - Single Lumen 03/02/25 0536 20 G Anterior;Proximal;Right Forearm 2 days                     Physical Exam  NAD  Merocel bilateral nares  Anterior septal perforation  Oropharynx clear       Significant Labs:  CBC:   Recent Labs   Lab 03/04/25  0811   WBC 4.58   RBC 2.64*   HGB  7.5*   HCT 24.9*      MCV 94   MCH 28.4   MCHC 30.1*     CMP:   Recent Labs   Lab 03/04/25  0811   GLU 74   CALCIUM 9.1   ALBUMIN 2.8*   PROT 6.2      K 4.5   CO2 17*   *   BUN 33*   CREATININE 1.8*   ALKPHOS 79   ALT 8*   AST 21   BILITOT 0.3       Significant Diagnostics:  None  Assessment/Plan:     History of epistaxis  84 y.o. with history of recurrent epistaxis, on Coumadin for heart valve, who is s/p bilateral SP ligation and bilateral embolization and multiple cauterizations by ENT. He presents due to epistaxis 2/24/25. He was hemostatic in the ER. Hgb was 6.7, s/p 1u pRBC in the ER. Admitted to  for observation      - Patient hemostatic this morning - ok for diet today  - Tentative plan for nasal endoscopy in OR Wednesday 3/5, sooner if bleeds again   - will consent  - Saline spray  - Abx while packing in place - anti staph coverage  - Keep head of bed elevated  - Please call ENT with questions  - Remainder of care per primary team              Johnna Rodriguez MD  Otorhinolaryngology-Head & Neck Surgery  [facility] - Med Surg

## 2025-03-04 NOTE — ASSESSMENT & PLAN NOTE
Initially held warfarin due to supratherapeutic INR. Now subtherapeutic.     Plan  - Increased Warfarin to 5mg   - Monitor PT-INR

## 2025-03-04 NOTE — SUBJECTIVE & OBJECTIVE
Interval History: INR within goal at 2.1. No acute bleeds overnight. Robitussin PRN for cough. Plan for Cauterization tomorrow. NPO at MN.     Review of Systems  Objective:     Vital Signs (Most Recent):  Temp: 98.2 °F (36.8 °C) (03/04/25 1202)  Pulse: 61 (03/04/25 1202)  Resp: 18 (03/04/25 1202)  BP: 134/68 (03/04/25 1202)  SpO2: 99 % (03/04/25 1202) Vital Signs (24h Range):  Temp:  [97.1 °F (36.2 °C)-98.2 °F (36.8 °C)] 98.2 °F (36.8 °C)  Pulse:  [60-66] 61  Resp:  [16-18] 18  SpO2:  [91 %-99 %] 99 %  BP: (127-143)/(58-68) 134/68     Weight: 65.8 kg (145 lb)  Body mass index is 23.4 kg/m².  No intake or output data in the 24 hours ending 03/04/25 1211      Physical Exam  HENT:      Nose:      Right Nostril: No epistaxis.      Left Nostril: No epistaxis.      Comments: Nasal packing inside  Eyes:      Comments: Pale palpebra   Cardiovascular:      Rate and Rhythm: Normal rate.      Heart sounds: Murmur heard.   Pulmonary:      Effort: No respiratory distress.      Breath sounds: Rhonchi present.      Comments: Productive cough present  Neurological:      Mental Status: He is alert and oriented to person, place, and time. Mental status is at baseline.      Cranial Nerves: No cranial nerve deficit.             Significant Labs: All pertinent labs within the past 24 hours have been reviewed.    Significant Imaging: I have reviewed all pertinent imaging results/findings within the past 24 hours.

## 2025-03-04 NOTE — SUBJECTIVE & OBJECTIVE
Interval History: No issues overnight. Hemostatic on exam this morning.     Medications:  Continuous Infusions:  Scheduled Meds:   atorvastatin  80 mg Oral Daily    cefTRIAXone (Rocephin) IV (PEDS and ADULTS)  1 g Intravenous Q24H    doxycycline  100 mg Oral Q12H    isosorbide mononitrate  60 mg Oral QHS    polyethylene glycol  17 g Oral Daily    sodium chloride  2 spray Each Nostril TID    traZODone  50 mg Oral QHS    warfarin  5 mg Oral Daily     PRN Meds:  Current Facility-Administered Medications:     albuterol-ipratropium, 3 mL, Nebulization, Q6H PRN    dextromethorphan-guaiFENesin  mg/5 ml, 5 mL, Oral, Q4H PRN    dextrose 50%, 12.5 g, Intravenous, PRN    dextrose 50%, 25 g, Intravenous, PRN    glucagon (human recombinant), 1 mg, Intramuscular, PRN    glucose, 16 g, Oral, PRN    glucose, 24 g, Oral, PRN    insulin aspart U-100, 0-5 Units, Subcutaneous, QID (AC + HS) PRN    naloxone, 0.02 mg, Intravenous, PRN    senna, 8.6 mg, Oral, Daily PRN    sodium chloride 0.9%, 10 mL, Intravenous, Q12H PRN     Review of patient's allergies indicates:   Allergen Reactions    Lisinopril     Losartan      Intolerance- elevates potassium level     Objective:     Vital Signs (24h Range):  Temp:  [97.1 °F (36.2 °C)-97.7 °F (36.5 °C)] 97.7 °F (36.5 °C)  Pulse:  [60-66] 66  Resp:  [16-18] 16  SpO2:  [91 %-97 %] 96 %  BP: (127-143)/(58-66) 127/58       Lines/Drains/Airways       Peripheral Intravenous Line  Duration                  Peripheral IV - Single Lumen 03/02/25 0536 20 G Anterior;Proximal;Right Forearm 2 days                     Physical Exam  NAD  Merocel bilateral nares  Anterior septal perforation  Oropharynx clear       Significant Labs:  CBC:   Recent Labs   Lab 03/04/25  0811   WBC 4.58   RBC 2.64*   HGB 7.5*   HCT 24.9*      MCV 94   MCH 28.4   MCHC 30.1*     CMP:   Recent Labs   Lab 03/04/25  0811   GLU 74   CALCIUM 9.1   ALBUMIN 2.8*   PROT 6.2      K 4.5   CO2 17*   *   BUN 33*    CREATININE 1.8*   ALKPHOS 79   ALT 8*   AST 21   BILITOT 0.3       Significant Diagnostics:  None

## 2025-03-04 NOTE — ASSESSMENT & PLAN NOTE
84 y.o. with history of recurrent epistaxis, on Coumadin for heart valve, who is s/p bilateral SP ligation and bilateral embolization and multiple cauterizations by ENT. He presents due to epistaxis 2/24/25. He was hemostatic in the ER. Hgb was 6.7, s/p 1u pRBC in the ER. Admitted to  for observation      - Patient hemostatic this morning - ok for diet today  - Tentative plan for nasal endoscopy in OR Wednesday 3/5, sooner if bleeds again   - will consent  - Saline spray  - Abx while packing in place - anti staph coverage  - Keep head of bed elevated  - Please call ENT with questions  - Remainder of care per primary team

## 2025-03-05 ENCOUNTER — ANESTHESIA (OUTPATIENT)
Dept: SURGERY | Facility: HOSPITAL | Age: 85
End: 2025-03-05
Payer: MEDICARE

## 2025-03-05 LAB
ALBUMIN SERPL BCP-MCNC: 2.7 G/DL (ref 3.5–5.2)
ALP SERPL-CCNC: 77 U/L (ref 40–150)
ALT SERPL W/O P-5'-P-CCNC: 7 U/L (ref 10–44)
ANION GAP SERPL CALC-SCNC: 7 MMOL/L (ref 8–16)
AST SERPL-CCNC: 17 U/L (ref 10–40)
BASOPHILS # BLD AUTO: 0.04 K/UL (ref 0–0.2)
BASOPHILS NFR BLD: 0.8 % (ref 0–1.9)
BILIRUB SERPL-MCNC: 0.3 MG/DL (ref 0.1–1)
BUN SERPL-MCNC: 33 MG/DL (ref 8–23)
CALCIUM SERPL-MCNC: 9.1 MG/DL (ref 8.7–10.5)
CHLORIDE SERPL-SCNC: 111 MMOL/L (ref 95–110)
CO2 SERPL-SCNC: 18 MMOL/L (ref 23–29)
CREAT SERPL-MCNC: 1.7 MG/DL (ref 0.5–1.4)
DIFFERENTIAL METHOD BLD: ABNORMAL
EOSINOPHIL # BLD AUTO: 0.2 K/UL (ref 0–0.5)
EOSINOPHIL NFR BLD: 4.3 % (ref 0–8)
ERYTHROCYTE [DISTWIDTH] IN BLOOD BY AUTOMATED COUNT: 15 % (ref 11.5–14.5)
EST. GFR  (NO RACE VARIABLE): 39.5 ML/MIN/1.73 M^2
GLUCOSE SERPL-MCNC: 57 MG/DL (ref 70–110)
HCT VFR BLD AUTO: 24.9 % (ref 40–54)
HGB BLD-MCNC: 7.5 G/DL (ref 14–18)
IMM GRANULOCYTES # BLD AUTO: 0.01 K/UL (ref 0–0.04)
IMM GRANULOCYTES NFR BLD AUTO: 0.2 % (ref 0–0.5)
INR PPP: 2.4 (ref 0.8–1.2)
LYMPHOCYTES # BLD AUTO: 1 K/UL (ref 1–4.8)
LYMPHOCYTES NFR BLD: 21.4 % (ref 18–48)
MAGNESIUM SERPL-MCNC: 1.7 MG/DL (ref 1.6–2.6)
MCH RBC QN AUTO: 28.3 PG (ref 27–31)
MCHC RBC AUTO-ENTMCNC: 30.1 G/DL (ref 32–36)
MCV RBC AUTO: 94 FL (ref 82–98)
MONOCYTES # BLD AUTO: 0.6 K/UL (ref 0.3–1)
MONOCYTES NFR BLD: 12.3 % (ref 4–15)
NEUTROPHILS # BLD AUTO: 3 K/UL (ref 1.8–7.7)
NEUTROPHILS NFR BLD: 61 % (ref 38–73)
NRBC BLD-RTO: 0 /100 WBC
PHOSPHATE SERPL-MCNC: 2.8 MG/DL (ref 2.7–4.5)
PLATELET # BLD AUTO: 189 K/UL (ref 150–450)
PMV BLD AUTO: 10.6 FL (ref 9.2–12.9)
POCT GLUCOSE: 100 MG/DL (ref 70–110)
POCT GLUCOSE: 128 MG/DL (ref 70–110)
POCT GLUCOSE: 79 MG/DL (ref 70–110)
POCT GLUCOSE: 94 MG/DL (ref 70–110)
POTASSIUM SERPL-SCNC: 4.3 MMOL/L (ref 3.5–5.1)
PROT SERPL-MCNC: 5.9 G/DL (ref 6–8.4)
PROTHROMBIN TIME: 24.8 SEC (ref 9–12.5)
RBC # BLD AUTO: 2.65 M/UL (ref 4.6–6.2)
SODIUM SERPL-SCNC: 136 MMOL/L (ref 136–145)
WBC # BLD AUTO: 4.87 K/UL (ref 3.9–12.7)

## 2025-03-05 PROCEDURE — 36415 COLL VENOUS BLD VENIPUNCTURE: CPT | Mod: HCNC

## 2025-03-05 PROCEDURE — 83735 ASSAY OF MAGNESIUM: CPT | Mod: HCNC

## 2025-03-05 PROCEDURE — 85610 PROTHROMBIN TIME: CPT | Mod: HCNC

## 2025-03-05 PROCEDURE — 11000001 HC ACUTE MED/SURG PRIVATE ROOM: Mod: HCNC

## 2025-03-05 PROCEDURE — 27000646 HC AEROBIKA DEVICE: Mod: HCNC

## 2025-03-05 PROCEDURE — 94761 N-INVAS EAR/PLS OXIMETRY MLT: CPT | Mod: HCNC

## 2025-03-05 PROCEDURE — 25000242 PHARM REV CODE 250 ALT 637 W/ HCPCS: Mod: HCNC

## 2025-03-05 PROCEDURE — 85025 COMPLETE CBC W/AUTO DIFF WBC: CPT | Mod: HCNC

## 2025-03-05 PROCEDURE — 94799 UNLISTED PULMONARY SVC/PX: CPT | Mod: HCNC

## 2025-03-05 PROCEDURE — 80053 COMPREHEN METABOLIC PANEL: CPT | Mod: HCNC

## 2025-03-05 PROCEDURE — 94664 DEMO&/EVAL PT USE INHALER: CPT | Mod: HCNC

## 2025-03-05 PROCEDURE — 94640 AIRWAY INHALATION TREATMENT: CPT | Mod: HCNC

## 2025-03-05 PROCEDURE — 99900035 HC TECH TIME PER 15 MIN (STAT): Mod: HCNC

## 2025-03-05 PROCEDURE — 84100 ASSAY OF PHOSPHORUS: CPT | Mod: HCNC

## 2025-03-05 PROCEDURE — 25000003 PHARM REV CODE 250: Mod: HCNC

## 2025-03-05 RX ORDER — SODIUM CHLORIDE FOR INHALATION 3 %
4 VIAL, NEBULIZER (ML) INHALATION ONCE
Status: COMPLETED | OUTPATIENT
Start: 2025-03-05 | End: 2025-03-05

## 2025-03-05 RX ORDER — WARFARIN 2.5 MG/1
2.5 TABLET ORAL DAILY
Status: DISCONTINUED | OUTPATIENT
Start: 2025-03-05 | End: 2025-03-07

## 2025-03-05 RX ADMIN — DOXYCYCLINE HYCLATE 100 MG: 100 TABLET, COATED ORAL at 09:03

## 2025-03-05 RX ADMIN — ISOSORBIDE MONONITRATE 60 MG: 60 TABLET, EXTENDED RELEASE ORAL at 09:03

## 2025-03-05 RX ADMIN — TRAZODONE HYDROCHLORIDE 50 MG: 50 TABLET ORAL at 09:03

## 2025-03-05 RX ADMIN — SODIUM CHLORIDE SOLN NEBU 3% 4 ML: 3 NEBU SOLN at 11:03

## 2025-03-05 RX ADMIN — WARFARIN SODIUM 2.5 MG: 2.5 TABLET ORAL at 03:03

## 2025-03-05 RX ADMIN — ATORVASTATIN CALCIUM 80 MG: 40 TABLET, FILM COATED ORAL at 09:03

## 2025-03-05 NOTE — ASSESSMENT & PLAN NOTE
Pt with chronic nosebleeds. Had multiple cauterizations in the past. ENT consulted. On warfarin. Has nasal packing due to rebleeds.     - ENT consulted, appreciate recs  - Cauterization with ENT post-poned on 2/26 d/t supratherapeutic INR and developing PNA   - Cauterization on 3/6, planned.  - On Doxy for MRSA coverage

## 2025-03-05 NOTE — PROGRESS NOTES
[facility] Duval - [facility]  Otorhinolaryngology-Head & Neck Surgery  Progress Note    Subjective:     Post-Op Info:  Procedure(s) (LRB):  CAUTERIZATION, NOSE, ENDOSCOPIC (N/A)   7 Days Post-Op  Hospital Day: 10     Interval History: No issues overnight. Hemostatic on exam this morning.     Medications:  Continuous Infusions:  Scheduled Meds:   atorvastatin  80 mg Oral Daily    doxycycline  100 mg Oral Q12H    isosorbide mononitrate  60 mg Oral QHS    polyethylene glycol  17 g Oral Daily    sodium chloride  2 spray Each Nostril TID    sodium chloride 3%  4 mL Nebulization Once    traZODone  50 mg Oral QHS    warfarin  2.5 mg Oral Daily     PRN Meds:  Current Facility-Administered Medications:     albuterol-ipratropium, 3 mL, Nebulization, Q6H PRN    dextromethorphan-guaiFENesin  mg/5 ml, 5 mL, Oral, Q4H PRN    dextrose 50%, 12.5 g, Intravenous, PRN    dextrose 50%, 25 g, Intravenous, PRN    glucagon (human recombinant), 1 mg, Intramuscular, PRN    glucose, 16 g, Oral, PRN    glucose, 24 g, Oral, PRN    insulin aspart U-100, 0-5 Units, Subcutaneous, QID (AC + HS) PRN    naloxone, 0.02 mg, Intravenous, PRN    senna, 8.6 mg, Oral, Daily PRN    sodium chloride 0.9%, 10 mL, Intravenous, Q12H PRN     Review of patient's allergies indicates:   Allergen Reactions    Lisinopril     Losartan      Intolerance- elevates potassium level     Objective:     Vital Signs (24h Range):  Temp:  [97.1 °F (36.2 °C)-98.2 °F (36.8 °C)] 97.1 °F (36.2 °C)  Pulse:  [] 68  Resp:  [16-18] 16  SpO2:  [87 %-99 %] 99 %  BP: (127-155)/(55-68) 147/55       Lines/Drains/Airways       Peripheral Intravenous Line  Duration                  Peripheral IV - Single Lumen 03/02/25 0536 20 G Anterior;Proximal;Right Forearm 3 days                     Physical Exam  NAD  Merocel bilateral nares  Anterior septal perforation  Oropharynx with small amount of old blood tinged mucus, otherwise clear and no active bleeding       Significant Labs:  CBC:   Recent  Labs   Lab 03/05/25  0254   WBC 4.87   RBC 2.65*   HGB 7.5*   HCT 24.9*      MCV 94   MCH 28.3   MCHC 30.1*     CMP:   Recent Labs   Lab 03/05/25  0254   GLU 57*   CALCIUM 9.1   ALBUMIN 2.7*   PROT 5.9*      K 4.3   CO2 18*   *   BUN 33*   CREATININE 1.7*   ALKPHOS 77   ALT 7*   AST 17   BILITOT 0.3       Significant Diagnostics:  None  Assessment/Plan:     History of epistaxis  84 y.o. with history of recurrent epistaxis, on Coumadin for heart valve, who is s/p bilateral SP ligation and bilateral embolization and multiple cauterizations by ENT. He presents due to epistaxis 2/24/25. He was hemostatic in the ER. Hgb was 6.7, s/p 1u pRBC in the ER. Admitted to  for observation      - Patient hemostatic this morning - ok for diet today  - Plan for nasal endoscopy in OR Thursday 3/6, sooner if bleeds again  - Saline spray  - Abx while packing in place - anti staph coverage  - Keep head of bed elevated  - Please call ENT with questions  - Remainder of care per primary team              Johnna Rodriguez MD  Otorhinolaryngology-Head & Neck Surgery  [facility] - Med Surg

## 2025-03-05 NOTE — ASSESSMENT & PLAN NOTE
84 y.o. with history of recurrent epistaxis, on Coumadin for heart valve, who is s/p bilateral SP ligation and bilateral embolization and multiple cauterizations by ENT. He presents due to epistaxis 2/24/25. He was hemostatic in the ER. Hgb was 6.7, s/p 1u pRBC in the ER. Admitted to  for observation      - Patient hemostatic this morning - ok for diet today  - Plan for nasal endoscopy in OR Thursday 3/6, sooner if bleeds again  - Saline spray  - Abx while packing in place - anti staph coverage  - Keep head of bed elevated  - Please call ENT with questions  - Remainder of care per primary team

## 2025-03-05 NOTE — SUBJECTIVE & OBJECTIVE
Interval History: No issues overnight. Hemostatic on exam this morning.     Medications:  Continuous Infusions:  Scheduled Meds:   atorvastatin  80 mg Oral Daily    doxycycline  100 mg Oral Q12H    isosorbide mononitrate  60 mg Oral QHS    polyethylene glycol  17 g Oral Daily    sodium chloride  2 spray Each Nostril TID    sodium chloride 3%  4 mL Nebulization Once    traZODone  50 mg Oral QHS    warfarin  2.5 mg Oral Daily     PRN Meds:  Current Facility-Administered Medications:     albuterol-ipratropium, 3 mL, Nebulization, Q6H PRN    dextromethorphan-guaiFENesin  mg/5 ml, 5 mL, Oral, Q4H PRN    dextrose 50%, 12.5 g, Intravenous, PRN    dextrose 50%, 25 g, Intravenous, PRN    glucagon (human recombinant), 1 mg, Intramuscular, PRN    glucose, 16 g, Oral, PRN    glucose, 24 g, Oral, PRN    insulin aspart U-100, 0-5 Units, Subcutaneous, QID (AC + HS) PRN    naloxone, 0.02 mg, Intravenous, PRN    senna, 8.6 mg, Oral, Daily PRN    sodium chloride 0.9%, 10 mL, Intravenous, Q12H PRN     Review of patient's allergies indicates:   Allergen Reactions    Lisinopril     Losartan      Intolerance- elevates potassium level     Objective:     Vital Signs (24h Range):  Temp:  [97.1 °F (36.2 °C)-98.2 °F (36.8 °C)] 97.1 °F (36.2 °C)  Pulse:  [] 68  Resp:  [16-18] 16  SpO2:  [87 %-99 %] 99 %  BP: (127-155)/(55-68) 147/55       Lines/Drains/Airways       Peripheral Intravenous Line  Duration                  Peripheral IV - Single Lumen 03/02/25 0536 20 G Anterior;Proximal;Right Forearm 3 days                     Physical Exam  NAD  Merocel bilateral nares  Anterior septal perforation  Oropharynx with small amount of old blood tinged mucus, otherwise clear and no active bleeding       Significant Labs:  CBC:   Recent Labs   Lab 03/05/25  0254   WBC 4.87   RBC 2.65*   HGB 7.5*   HCT 24.9*      MCV 94   MCH 28.3   MCHC 30.1*     CMP:   Recent Labs   Lab 03/05/25  0254   GLU 57*   CALCIUM 9.1   ALBUMIN 2.7*   PROT  5.9*      K 4.3   CO2 18*   *   BUN 33*   CREATININE 1.7*   ALKPHOS 77   ALT 7*   AST 17   BILITOT 0.3       Significant Diagnostics:  None

## 2025-03-05 NOTE — SUBJECTIVE & OBJECTIVE
Interval History: No acute events overnight. Pt complaining of cough. INR-2.4.    Review of Systems  Objective:     Vital Signs (Most Recent):  Temp: 97.1 °F (36.2 °C) (03/05/25 0751)  Pulse: 68 (03/05/25 0800)  Resp: 16 (03/05/25 0032)  BP: (!) 147/55 (03/05/25 0751)  SpO2: 99 % (03/05/25 0800) Vital Signs (24h Range):  Temp:  [97.1 °F (36.2 °C)-98.2 °F (36.8 °C)] 97.1 °F (36.2 °C)  Pulse:  [] 68  Resp:  [16-18] 16  SpO2:  [87 %-99 %] 99 %  BP: (127-155)/(55-68) 147/55     Weight: 65.8 kg (145 lb)  Body mass index is 23.4 kg/m².  No intake or output data in the 24 hours ending 03/05/25 0855      Physical Exam  HENT:      Nose:      Right Nostril: No epistaxis.      Left Nostril: No epistaxis.      Comments: Nasal packing inside  Eyes:      Comments: Pale palpebra   Cardiovascular:      Rate and Rhythm: Normal rate.      Heart sounds: Murmur heard.   Pulmonary:      Effort: No respiratory distress.      Breath sounds: Rhonchi present.      Comments: Productive cough present  Neurological:      Mental Status: He is alert and oriented to person, place, and time. Mental status is at baseline.      Cranial Nerves: No cranial nerve deficit.             Significant Labs: All pertinent labs within the past 24 hours have been reviewed.    Significant Imaging: I have reviewed all pertinent imaging results/findings within the past 24 hours.

## 2025-03-05 NOTE — PROGRESS NOTES
[facility]  Progress Note    Patient Name: Dariel Urbina  MRN: [id number]  Patient Class: IP- Inpatient   Admission Date: 2/24/2025  Length of Stay: 8 days  Attending Physician: Nikkie Monsivais MD  Primary Care Provider: Devon Langston Jr., MD        Subjective     Principal Problem:Acute blood loss anemia        HPI:  84 y.o. male with  paroxysmal afib, mechanical aortic valve replacement on Coumadin, CHF, CKD stage 3, T2DM, HTN, recurrent epistaxis s/p multiple cauterizations who is admitted to hospital medicine for acute blood loss anemia 2/2 to profound epistaxis.     Pt noted to have intermittent nosebleeds since prior cauterization which resolve with pressure and afrin. Usually has 1-3 epistaxis episodes a day that last for 1-2 hours. But this past week they have worsened in severity and have been more difficult to control with pressure and Afrin. Today morning 2 AM, he had a large episode of epistaxis that wouldn't resolve with Afrin and pressure till about 11:30 AM prior to arrival at ED. Denies any trauma to the area or any specific triggering events. He reports this past week he has had cough, sore throat, and congestion. He denies any fatigue, dizziness, chest pain, or abdominal pain.     ED Course: XR chest unremarkable, Hgb 6.7, Hct 22.2 - 1 unit pRBC given    Overview/Hospital Course:  84 y.o. male with  paroxysmal afib, mechanical aortic valve replacement on Coumadin, CHF, CKD stage 3, T2DM, HTN, recurrent epistaxis s/p multiple cauterizations who is admitted to hospital medicine for acute blood loss anemia 2/2 to profound epistaxis. On admission, HgB- 6.7, received 1 unit of pRBCs. Repeat HgB s/p transfusion is 7.2. PT-INR goal per coumadin clinic is 2.2.5. Has wheezes on physical exam which is new. Covid/Flu/RSV panel unremarkable CXR concerning for possible infectious process. ENT planned for cauterization on 2/26, however pt wheezing worsened and it was decided to  postpone surgery. Started on Ceftriaxone/Doxy for CAP.  Monitoring Pt-INR. Started on Warfarin 5 mg but PT-INR subtherapeutic now. Started on Heparin to bridge, but pt experienced another episode of epistaxis on 3/1. Heparin discontinued. Increased warfarin to 5 mg daily due to subtherapeutic INR. ENT saw pt and opted for cauterization of 3/6/25.     Interval History: No acute events overnight. Pt complaining of cough. INR-2.4.    Review of Systems  Objective:     Vital Signs (Most Recent):  Temp: 97.1 °F (36.2 °C) (03/05/25 0751)  Pulse: 68 (03/05/25 0800)  Resp: 16 (03/05/25 0032)  BP: (!) 147/55 (03/05/25 0751)  SpO2: 99 % (03/05/25 0800) Vital Signs (24h Range):  Temp:  [97.1 °F (36.2 °C)-98.2 °F (36.8 °C)] 97.1 °F (36.2 °C)  Pulse:  [] 68  Resp:  [16-18] 16  SpO2:  [87 %-99 %] 99 %  BP: (127-155)/(55-68) 147/55     Weight: 65.8 kg (145 lb)  Body mass index is 23.4 kg/m².  No intake or output data in the 24 hours ending 03/05/25 0855      Physical Exam  HENT:      Nose:      Right Nostril: No epistaxis.      Left Nostril: No epistaxis.      Comments: Nasal packing inside  Eyes:      Comments: Pale palpebra   Cardiovascular:      Rate and Rhythm: Normal rate.      Heart sounds: Murmur heard.   Pulmonary:      Effort: No respiratory distress.      Breath sounds: Rhonchi present.      Comments: Productive cough present  Neurological:      Mental Status: He is alert and oriented to person, place, and time. Mental status is at baseline.      Cranial Nerves: No cranial nerve deficit.             Significant Labs: All pertinent labs within the past 24 hours have been reviewed.    Significant Imaging: I have reviewed all pertinent imaging results/findings within the past 24 hours.    Assessment and Plan     * Acute blood loss anemia  Anemia is likely due to acute blood loss which was from Epistaxis related to warfarin . Most recent hemoglobin and hematocrit are listed below.  Recent Labs     03/01/25  1604  03/02/25  0950 03/03/25  0523   HGB 7.8* 7.7* 8.0*   HCT 25.7* 25.6* 26.6*       Plan  - If pt has bleed again, afrin instructions per ENT.   - Monitor serial CBC: Every 12 hours  - Transfuse PRBC if patient becomes hemodynamically unstable, symptomatic or H/H drops below 7/21.        Right lower lobe pneumonia  Wheezes present on PE. Concerning for developing infection, at least post viral pneumonia. Not on O2.    Plan  -Started on ceftriaxone/doxy      History of epistaxis  Pt with chronic nosebleeds. Had multiple cauterizations in the past. ENT consulted. On warfarin. Has nasal packing due to rebleeds.     - ENT consulted, appreciate recs  - Cauterization with ENT post-poned on 2/26 d/t supratherapeutic INR and developing PNA   - Cauterization on 3/6, planned.  - On Doxy for MRSA coverage      H/O mechanical aortic valve replacement  Resuming warfarin      Type 2 diabetes mellitus with stage 4 chronic kidney disease, without long-term current use of insulin  Creatine stable for now. BMP reviewed- noted Estimated Creatinine Clearance: 20.2 mL/min (A) (based on SCr of 2.5 mg/dL (H)). according to latest data. 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  Monitor UOP and serial BMP and adjust therapy as needed. Renally dose meds. Avoid nephrotoxic medications and procedures.    Plan  - Low dose sliding scale  -Holding allopurinol    Hyperlipidemia associated with type 2 diabetes mellitus    Continue home statin    Chronic anticoagulation  Initially held warfarin due to supratherapeutic INR. Now in therapeutic goal.     Plan  - Warfarin management per pharmacy  - Monitor PT-INR        VTE Risk Mitigation (From admission, onward)           Ordered     warfarin (COUMADIN) tablet 5 mg  Daily         03/03/25 0944     Reason for No Pharmacological VTE Prophylaxis  Once        Question:  Reasons:  Answer:  Active Bleeding    02/24/25 1421     IP VTE HIGH RISK PATIENT  Once         02/24/25 1421     Place  sequential compression device  Until discontinued         02/24/25 1421                    Discharge Planning   ASTER: 3/6/2025     Code Status: Full Code   Medical Readiness for Discharge Date:   Discharge Plan A: Home, Home with family   Discharge Delays: None known at this time                    Puma Arvizu MD  Department of Hospital Medicine   [facility]

## 2025-03-05 NOTE — ASSESSMENT & PLAN NOTE
Initially held warfarin due to supratherapeutic INR. Now in therapeutic goal.     Plan  - Warfarin management per pharmacy  - Monitor PT-INR

## 2025-03-06 LAB
ABO + RH BLD: NORMAL
ALBUMIN SERPL BCP-MCNC: 2.7 G/DL (ref 3.5–5.2)
ALP SERPL-CCNC: 82 U/L (ref 40–150)
ALT SERPL W/O P-5'-P-CCNC: 8 U/L (ref 10–44)
ANION GAP SERPL CALC-SCNC: 7 MMOL/L (ref 8–16)
AST SERPL-CCNC: 34 U/L (ref 10–40)
BASOPHILS # BLD AUTO: 0.06 K/UL (ref 0–0.2)
BASOPHILS NFR BLD: 1.3 % (ref 0–1.9)
BILIRUB SERPL-MCNC: 0.3 MG/DL (ref 0.1–1)
BLD GP AB SCN CELLS X3 SERPL QL: NORMAL
BLD PROD TYP BPU: NORMAL
BLOOD UNIT EXPIRATION DATE: NORMAL
BLOOD UNIT TYPE CODE: 9500
BLOOD UNIT TYPE: NORMAL
BUN SERPL-MCNC: 34 MG/DL (ref 8–23)
CALCIUM SERPL-MCNC: 9.4 MG/DL (ref 8.7–10.5)
CHLORIDE SERPL-SCNC: 116 MMOL/L (ref 95–110)
CO2 SERPL-SCNC: 13 MMOL/L (ref 23–29)
CODING SYSTEM: NORMAL
CREAT SERPL-MCNC: 1.9 MG/DL (ref 0.5–1.4)
CROSSMATCH INTERPRETATION: NORMAL
DIFFERENTIAL METHOD BLD: ABNORMAL
DISPENSE STATUS: NORMAL
EOSINOPHIL # BLD AUTO: 0.2 K/UL (ref 0–0.5)
EOSINOPHIL NFR BLD: 3.1 % (ref 0–8)
ERYTHROCYTE [DISTWIDTH] IN BLOOD BY AUTOMATED COUNT: 14.9 % (ref 11.5–14.5)
EST. GFR  (NO RACE VARIABLE): 34.6 ML/MIN/1.73 M^2
GLUCOSE SERPL-MCNC: 55 MG/DL (ref 70–110)
HCT VFR BLD AUTO: 26.8 % (ref 40–54)
HGB BLD-MCNC: 8.2 G/DL (ref 14–18)
IMM GRANULOCYTES # BLD AUTO: 0.02 K/UL (ref 0–0.04)
IMM GRANULOCYTES NFR BLD AUTO: 0.4 % (ref 0–0.5)
INR PPP: 2.6 (ref 0.8–1.2)
LYMPHOCYTES # BLD AUTO: 0.8 K/UL (ref 1–4.8)
LYMPHOCYTES NFR BLD: 17.3 % (ref 18–48)
MAGNESIUM SERPL-MCNC: 1.7 MG/DL (ref 1.6–2.6)
MCH RBC QN AUTO: 28.8 PG (ref 27–31)
MCHC RBC AUTO-ENTMCNC: 30.6 G/DL (ref 32–36)
MCV RBC AUTO: 94 FL (ref 82–98)
MONOCYTES # BLD AUTO: 0.5 K/UL (ref 0.3–1)
MONOCYTES NFR BLD: 10 % (ref 4–15)
NEUTROPHILS # BLD AUTO: 3.3 K/UL (ref 1.8–7.7)
NEUTROPHILS NFR BLD: 67.9 % (ref 38–73)
NRBC BLD-RTO: 0 /100 WBC
PHOSPHATE SERPL-MCNC: 3 MG/DL (ref 2.7–4.5)
PLATELET # BLD AUTO: 199 K/UL (ref 150–450)
PMV BLD AUTO: 10.7 FL (ref 9.2–12.9)
POCT GLUCOSE: 75 MG/DL (ref 70–110)
POCT GLUCOSE: 81 MG/DL (ref 70–110)
POCT GLUCOSE: 84 MG/DL (ref 70–110)
POCT GLUCOSE: 87 MG/DL (ref 70–110)
POCT GLUCOSE: 95 MG/DL (ref 70–110)
POCT GLUCOSE: 97 MG/DL (ref 70–110)
POTASSIUM SERPL-SCNC: 5 MMOL/L (ref 3.5–5.1)
PROT SERPL-MCNC: 6 G/DL (ref 6–8.4)
PROTHROMBIN TIME: 26.5 SEC (ref 9–12.5)
RBC # BLD AUTO: 2.85 M/UL (ref 4.6–6.2)
SODIUM SERPL-SCNC: 136 MMOL/L (ref 136–145)
SPECIMEN OUTDATE: NORMAL
TRANS ERYTHROCYTES VOL PATIENT: NORMAL ML
WBC # BLD AUTO: 4.79 K/UL (ref 3.9–12.7)

## 2025-03-06 PROCEDURE — 63600175 PHARM REV CODE 636 W HCPCS: Mod: HCNC | Performed by: STUDENT IN AN ORGANIZED HEALTH CARE EDUCATION/TRAINING PROGRAM

## 2025-03-06 PROCEDURE — 85610 PROTHROMBIN TIME: CPT | Mod: HCNC

## 2025-03-06 PROCEDURE — 83735 ASSAY OF MAGNESIUM: CPT | Mod: HCNC

## 2025-03-06 PROCEDURE — 85025 COMPLETE CBC W/AUTO DIFF WBC: CPT | Mod: HCNC

## 2025-03-06 PROCEDURE — 80053 COMPREHEN METABOLIC PANEL: CPT | Mod: HCNC

## 2025-03-06 PROCEDURE — 27201423 OPTIME MED/SURG SUP & DEVICES STERILE SUPPLY: Mod: HCNC | Performed by: STUDENT IN AN ORGANIZED HEALTH CARE EDUCATION/TRAINING PROGRAM

## 2025-03-06 PROCEDURE — 71000015 HC POSTOP RECOV 1ST HR: Mod: HCNC | Performed by: STUDENT IN AN ORGANIZED HEALTH CARE EDUCATION/TRAINING PROGRAM

## 2025-03-06 PROCEDURE — 86920 COMPATIBILITY TEST SPIN: CPT | Mod: HCNC

## 2025-03-06 PROCEDURE — 36000707: Mod: HCNC | Performed by: STUDENT IN AN ORGANIZED HEALTH CARE EDUCATION/TRAINING PROGRAM

## 2025-03-06 PROCEDURE — 36430 TRANSFUSION BLD/BLD COMPNT: CPT | Mod: HCNC

## 2025-03-06 PROCEDURE — 82962 GLUCOSE BLOOD TEST: CPT | Mod: HCNC | Performed by: STUDENT IN AN ORGANIZED HEALTH CARE EDUCATION/TRAINING PROGRAM

## 2025-03-06 PROCEDURE — 37000008 HC ANESTHESIA 1ST 15 MINUTES: Mod: HCNC | Performed by: STUDENT IN AN ORGANIZED HEALTH CARE EDUCATION/TRAINING PROGRAM

## 2025-03-06 PROCEDURE — 37000009 HC ANESTHESIA EA ADD 15 MINS: Mod: HCNC | Performed by: STUDENT IN AN ORGANIZED HEALTH CARE EDUCATION/TRAINING PROGRAM

## 2025-03-06 PROCEDURE — 63600175 PHARM REV CODE 636 W HCPCS: Mod: HCNC

## 2025-03-06 PROCEDURE — 71000033 HC RECOVERY, INTIAL HOUR: Mod: HCNC | Performed by: STUDENT IN AN ORGANIZED HEALTH CARE EDUCATION/TRAINING PROGRAM

## 2025-03-06 PROCEDURE — 36415 COLL VENOUS BLD VENIPUNCTURE: CPT | Mod: HCNC,XB

## 2025-03-06 PROCEDURE — P9021 RED BLOOD CELLS UNIT: HCPCS | Mod: HCNC

## 2025-03-06 PROCEDURE — 36415 COLL VENOUS BLD VENIPUNCTURE: CPT | Mod: HCNC

## 2025-03-06 PROCEDURE — 36000706: Mod: HCNC | Performed by: STUDENT IN AN ORGANIZED HEALTH CARE EDUCATION/TRAINING PROGRAM

## 2025-03-06 PROCEDURE — 84100 ASSAY OF PHOSPHORUS: CPT | Mod: HCNC

## 2025-03-06 PROCEDURE — 86850 RBC ANTIBODY SCREEN: CPT | Mod: HCNC

## 2025-03-06 PROCEDURE — 99232 SBSQ HOSP IP/OBS MODERATE 35: CPT | Mod: 25,HCNC,, | Performed by: STUDENT IN AN ORGANIZED HEALTH CARE EDUCATION/TRAINING PROGRAM

## 2025-03-06 PROCEDURE — 25000003 PHARM REV CODE 250: Mod: HCNC | Performed by: STUDENT IN AN ORGANIZED HEALTH CARE EDUCATION/TRAINING PROGRAM

## 2025-03-06 PROCEDURE — 21400001 HC TELEMETRY ROOM: Mod: HCNC

## 2025-03-06 PROCEDURE — 25000003 PHARM REV CODE 250: Mod: HCNC

## 2025-03-06 PROCEDURE — 093K8ZZ CONTROL BLEEDING IN NASAL MUCOSA AND SOFT TISSUE, VIA NATURAL OR ARTIFICIAL OPENING ENDOSCOPIC: ICD-10-PCS | Performed by: STUDENT IN AN ORGANIZED HEALTH CARE EDUCATION/TRAINING PROGRAM

## 2025-03-06 PROCEDURE — 31238 NSL/SINS NDSC SRG NSL HEMRRG: CPT | Mod: 50,HCNC,, | Performed by: STUDENT IN AN ORGANIZED HEALTH CARE EDUCATION/TRAINING PROGRAM

## 2025-03-06 RX ORDER — HYDROMORPHONE HYDROCHLORIDE 1 MG/ML
0.2 INJECTION, SOLUTION INTRAMUSCULAR; INTRAVENOUS; SUBCUTANEOUS EVERY 5 MIN PRN
Refills: 0 | Status: DISCONTINUED | OUTPATIENT
Start: 2025-03-06 | End: 2025-03-06 | Stop reason: HOSPADM

## 2025-03-06 RX ORDER — GLUCAGON 1 MG
1 KIT INJECTION
Status: DISCONTINUED | OUTPATIENT
Start: 2025-03-06 | End: 2025-03-06 | Stop reason: HOSPADM

## 2025-03-06 RX ORDER — VASOPRESSIN 20 [USP'U]/ML
INJECTION, SOLUTION INTRAMUSCULAR; SUBCUTANEOUS
Status: DISCONTINUED | OUTPATIENT
Start: 2025-03-06 | End: 2025-03-06

## 2025-03-06 RX ORDER — ROCURONIUM BROMIDE 10 MG/ML
INJECTION, SOLUTION INTRAVENOUS
Status: DISCONTINUED | OUTPATIENT
Start: 2025-03-06 | End: 2025-03-06

## 2025-03-06 RX ORDER — LIDOCAINE HYDROCHLORIDE 20 MG/ML
INJECTION, SOLUTION EPIDURAL; INFILTRATION; INTRACAUDAL; PERINEURAL
Status: DISCONTINUED | OUTPATIENT
Start: 2025-03-06 | End: 2025-03-06

## 2025-03-06 RX ORDER — HYDROCODONE BITARTRATE AND ACETAMINOPHEN 500; 5 MG/1; MG/1
TABLET ORAL
Status: DISCONTINUED | OUTPATIENT
Start: 2025-03-06 | End: 2025-03-07 | Stop reason: HOSPADM

## 2025-03-06 RX ORDER — DIPHENHYDRAMINE HYDROCHLORIDE 50 MG/ML
25 INJECTION, SOLUTION INTRAMUSCULAR; INTRAVENOUS EVERY 6 HOURS PRN
Status: DISCONTINUED | OUTPATIENT
Start: 2025-03-06 | End: 2025-03-06 | Stop reason: HOSPADM

## 2025-03-06 RX ORDER — ONDANSETRON HYDROCHLORIDE 2 MG/ML
4 INJECTION, SOLUTION INTRAVENOUS DAILY PRN
Status: DISCONTINUED | OUTPATIENT
Start: 2025-03-06 | End: 2025-03-06 | Stop reason: HOSPADM

## 2025-03-06 RX ORDER — EPINEPHRINE 1 MG/ML
INJECTION, SOLUTION, CONCENTRATE INTRAVENOUS
Status: DISCONTINUED | OUTPATIENT
Start: 2025-03-06 | End: 2025-03-06 | Stop reason: HOSPADM

## 2025-03-06 RX ORDER — ONDANSETRON HYDROCHLORIDE 2 MG/ML
INJECTION, SOLUTION INTRAVENOUS
Status: DISCONTINUED | OUTPATIENT
Start: 2025-03-06 | End: 2025-03-06

## 2025-03-06 RX ORDER — PROPOFOL 10 MG/ML
VIAL (ML) INTRAVENOUS
Status: DISCONTINUED | OUTPATIENT
Start: 2025-03-06 | End: 2025-03-06

## 2025-03-06 RX ORDER — DEXMEDETOMIDINE HYDROCHLORIDE 100 UG/ML
INJECTION, SOLUTION INTRAVENOUS
Status: DISCONTINUED | OUTPATIENT
Start: 2025-03-06 | End: 2025-03-06

## 2025-03-06 RX ORDER — FENTANYL CITRATE 50 UG/ML
INJECTION, SOLUTION INTRAMUSCULAR; INTRAVENOUS
Status: DISCONTINUED | OUTPATIENT
Start: 2025-03-06 | End: 2025-03-06

## 2025-03-06 RX ADMIN — ROCURONIUM BROMIDE 40 MG: 10 INJECTION, SOLUTION INTRAVENOUS at 09:03

## 2025-03-06 RX ADMIN — DEXMEDETOMIDINE 4 MCG: 100 INJECTION, SOLUTION, CONCENTRATE INTRAVENOUS at 09:03

## 2025-03-06 RX ADMIN — FENTANYL CITRATE 25 MCG: 50 INJECTION, SOLUTION INTRAMUSCULAR; INTRAVENOUS at 09:03

## 2025-03-06 RX ADMIN — ATORVASTATIN CALCIUM 80 MG: 40 TABLET, FILM COATED ORAL at 12:03

## 2025-03-06 RX ADMIN — ONDANSETRON 4 MG: 2 INJECTION INTRAMUSCULAR; INTRAVENOUS at 09:03

## 2025-03-06 RX ADMIN — SUGAMMADEX 200 MG: 100 INJECTION, SOLUTION INTRAVENOUS at 09:03

## 2025-03-06 RX ADMIN — ISOSORBIDE MONONITRATE 60 MG: 60 TABLET, EXTENDED RELEASE ORAL at 08:03

## 2025-03-06 RX ADMIN — PROPOFOL 100 MG: 10 INJECTION, EMULSION INTRAVENOUS at 09:03

## 2025-03-06 RX ADMIN — WARFARIN SODIUM 2.5 MG: 2.5 TABLET ORAL at 04:03

## 2025-03-06 RX ADMIN — GLYCOPYRROLATE 0.2 MG: 0.2 INJECTION, SOLUTION INTRAMUSCULAR; INTRAVENOUS at 09:03

## 2025-03-06 RX ADMIN — DOXYCYCLINE HYCLATE 100 MG: 100 TABLET, COATED ORAL at 08:03

## 2025-03-06 RX ADMIN — VASOPRESSIN 2 UNITS: 20 INJECTION INTRAVENOUS at 09:03

## 2025-03-06 RX ADMIN — LIDOCAINE HYDROCHLORIDE 80 MG: 20 INJECTION, SOLUTION EPIDURAL; INFILTRATION; INTRACAUDAL; PERINEURAL at 09:03

## 2025-03-06 RX ADMIN — ROCURONIUM BROMIDE 10 MG: 10 INJECTION, SOLUTION INTRAVENOUS at 09:03

## 2025-03-06 RX ADMIN — TRAZODONE HYDROCHLORIDE 50 MG: 50 TABLET ORAL at 08:03

## 2025-03-06 RX ADMIN — SODIUM CHLORIDE: 0.9 INJECTION, SOLUTION INTRAVENOUS at 09:03

## 2025-03-06 RX ADMIN — DOXYCYCLINE HYCLATE 100 MG: 100 TABLET, COATED ORAL at 12:03

## 2025-03-06 NOTE — NURSING
Reported to Endoscopy nurse Iram. Pt procedure consent and blood consent signed and on chart. No pxs overnight. No bleeding noted.

## 2025-03-06 NOTE — BRIEF OP NOTE
[facility] - Surgery (2nd Fl)  Brief Operative Note    Surgery Date: 3/6/2025     Surgeons and Role:     * Jnoo Russell MD - Primary    Assisting Surgeon: None    Pre-op Diagnosis:  Epistaxis [R04.0]    Post-op Diagnosis:  Post-Op Diagnosis Codes:     * Epistaxis [R04.0]    Procedure(s) (LRB):  CAUTERIZATION, NOSE, ENDOSCOPIC (Bilateral)    Anesthesia: General    Operative Findings: diffuse musocal oozing L>R. Bilateral cautery performed    Estimated Blood Loss: * No values recorded between 3/6/2025  9:01 AM and 3/6/2025  9:46 AM *         Specimens:   Specimen (24h ago, onward)      None

## 2025-03-06 NOTE — SUBJECTIVE & OBJECTIVE
Interval History: No bleeding overnight.    Medications:  Continuous Infusions:  Scheduled Meds:   atorvastatin  80 mg Oral Daily    doxycycline  100 mg Oral Q12H    isosorbide mononitrate  60 mg Oral QHS    polyethylene glycol  17 g Oral Daily    sodium chloride  2 spray Each Nostril TID    traZODone  50 mg Oral QHS    warfarin  2.5 mg Oral Daily     PRN Meds:  Current Facility-Administered Medications:     albuterol-ipratropium, 3 mL, Nebulization, Q6H PRN    dextromethorphan-guaiFENesin  mg/5 ml, 5 mL, Oral, Q4H PRN    dextrose 50%, 12.5 g, Intravenous, PRN    dextrose 50%, 25 g, Intravenous, PRN    glucagon (human recombinant), 1 mg, Intramuscular, PRN    glucose, 16 g, Oral, PRN    glucose, 24 g, Oral, PRN    insulin aspart U-100, 0-5 Units, Subcutaneous, QID (AC + HS) PRN    naloxone, 0.02 mg, Intravenous, PRN    senna, 8.6 mg, Oral, Daily PRN    sodium chloride 0.9%, 10 mL, Intravenous, Q12H PRN     Review of patient's allergies indicates:   Allergen Reactions    Lisinopril     Losartan      Intolerance- elevates potassium level     Objective:     Vital Signs (24h Range):  Temp:  [96.4 °F (35.8 °C)-98.5 °F (36.9 °C)] 97.3 °F (36.3 °C)  Pulse:  [] 67  Resp:  [16-18] 18  SpO2:  [87 %-99 %] 99 %  BP: (122-147)/(55-66) 122/58       Lines/Drains/Airways       Peripheral Intravenous Line  Duration                  Peripheral IV - Single Lumen 03/02/25 0536 20 G Anterior;Proximal;Right Forearm 4 days                     Physical Exam  Bilateral nasal packs in place  No bleeding from nose or into oropharynx     Significant Labs:  CBC:   Recent Labs   Lab 03/05/25  0254   WBC 4.87   RBC 2.65*   HGB 7.5*   HCT 24.9*      MCV 94   MCH 28.3   MCHC 30.1*       Significant Diagnostics:  None

## 2025-03-06 NOTE — PLAN OF CARE
[facility]  Discharge Reassessment    Primary Care Provider: Devon Langston Jr., MD    Expected Discharge Date: 3/7/2025    Reassessment (most recent)       Discharge Reassessment - 03/06/25 1345          Discharge Reassessment    Assessment Type Discharge Planning Reassessment (P)      Did the patient's condition or plan change since previous assessment? Yes (P)      Discharge Plan discussed with: Patient;Spouse/sig other (P)      Name(s) and Number(s) Ameena Urbina (spouse) [phone] (P)      Communicated ASTER with patient/caregiver Yes (P)      Discharge Plan A Home (P)      Discharge Plan B Home;Home with family (P)      DME Needed Upon Discharge  none (P)      Transition of Care Barriers None (P)      Why the patient remains in the hospital Requires continued medical care (P)         Post-Acute Status    Coverage [employer] (P)      Discharge Delays None known at this time (P)                    CM reviewed chart and spoke with wife at bedside (pt in procedure), once medically ready, pt will discharge home with family.  Spouse will assist with transportation at discharge.  CM will assist with follow up appointments as needed.  Will cont to coordinate care until discharge.     Discharge Plan A and Plan B have been determined by review of patient's clinical status, future medical and therapeutic needs, and coverage/benefits for post-acute care in coordination with multidisciplinary team members.     Maria Del Carmen Singleton, MSN   [facility]  [phone]

## 2025-03-06 NOTE — SUBJECTIVE & OBJECTIVE
Interval History: No acute events overnight. Underwent cauterization on 3/6. Doing well.     Review of Systems  Objective:     Vital Signs (Most Recent):  Temp: 97.3 °F (36.3 °C) (03/06/25 1152)  Pulse: 71 (03/06/25 1152)  Resp: 18 (03/06/25 1152)  BP: 137/62 (03/06/25 1152)  SpO2: 96 % (03/06/25 1152) Vital Signs (24h Range):  Temp:  [96.4 °F (35.8 °C)-98.5 °F (36.9 °C)] 97.3 °F (36.3 °C)  Pulse:  [65-86] 71  Resp:  [14-19] 18  SpO2:  [94 %-100 %] 96 %  BP: (122-164)/(55-74) 137/62     Weight: 65.8 kg (145 lb 1 oz)  Body mass index is 23.41 kg/m².    Intake/Output Summary (Last 24 hours) at 3/6/2025 1548  Last data filed at 3/6/2025 0951  Gross per 24 hour   Intake 1140 ml   Output --   Net 1140 ml         Physical Exam  HENT:      Nose:      Right Nostril: No epistaxis.      Left Nostril: No epistaxis.      Comments: Nasal packing inside  Eyes:      Comments: Pale palpebra   Cardiovascular:      Rate and Rhythm: Normal rate.      Heart sounds: Murmur heard.   Pulmonary:      Effort: No respiratory distress.      Breath sounds: Rhonchi present.      Comments: Productive cough present  Neurological:      Mental Status: He is alert and oriented to person, place, and time. Mental status is at baseline.      Cranial Nerves: No cranial nerve deficit.             Significant Labs: All pertinent labs within the past 24 hours have been reviewed.    Significant Imaging: I have reviewed all pertinent imaging results/findings within the past 24 hours.

## 2025-03-06 NOTE — NURSING
Status stable and unchanged. Slept well. Remains NPO for procedure today. CHG bath with gown changed by pt. Packing and rhinorocket intact to nostrils. NAD. HOB up 45 degrees.

## 2025-03-06 NOTE — PROGRESS NOTES
[facility]  Progress Note    Patient Name: Dariel Urbina  MRN: [id number]  Patient Class: IP- Inpatient   Admission Date: 2/24/2025  Length of Stay: 9 days  Attending Physician: Nikkie Monsivais MD  Primary Care Provider: Devon Langston Jr., MD        Subjective     Principal Problem:Acute blood loss anemia        HPI:  84 y.o. male with  paroxysmal afib, mechanical aortic valve replacement on Coumadin, CHF, CKD stage 3, T2DM, HTN, recurrent epistaxis s/p multiple cauterizations who is admitted to hospital medicine for acute blood loss anemia 2/2 to profound epistaxis.     Pt noted to have intermittent nosebleeds since prior cauterization which resolve with pressure and afrin. Usually has 1-3 epistaxis episodes a day that last for 1-2 hours. But this past week they have worsened in severity and have been more difficult to control with pressure and Afrin. Today morning 2 AM, he had a large episode of epistaxis that wouldn't resolve with Afrin and pressure till about 11:30 AM prior to arrival at ED. Denies any trauma to the area or any specific triggering events. He reports this past week he has had cough, sore throat, and congestion. He denies any fatigue, dizziness, chest pain, or abdominal pain.     ED Course: XR chest unremarkable, Hgb 6.7, Hct 22.2 - 1 unit pRBC given    Overview/Hospital Course:  84 y.o. male with  paroxysmal afib, mechanical aortic valve replacement on Coumadin, CHF, CKD stage 3, T2DM, HTN, recurrent epistaxis s/p multiple cauterizations who is admitted to hospital medicine for acute blood loss anemia 2/2 to profound epistaxis. On admission, HgB- 6.7, received 1 unit of pRBCs. Repeat HgB s/p transfusion is 7.2. PT-INR goal per coumadin clinic is 2.2.5. Has wheezes on physical exam which is new. Covid/Flu/RSV panel unremarkable CXR concerning for possible infectious process. ENT planned for cauterization on 2/26, however pt wheezing worsened and it was decided to  postpone surgery. Started on Ceftriaxone/Doxy for CAP.  Monitoring Pt-INR. Started on Warfarin 5 mg but PT-INR subtherapeutic now. Started on Heparin to bridge, but pt experienced another episode of epistaxis on 3/1. Heparin discontinued. Increased warfarin to 5 mg daily due to subtherapeutic INR and subsequently decreased to 2.5 mg. Underwent cauterization by ENT on 3/6, doing well. Transfusing 1 unit prior to discharge.     Interval History: No acute events overnight. Underwent cauterization on 3/6. Doing well.     Review of Systems  Objective:     Vital Signs (Most Recent):  Temp: 97.3 °F (36.3 °C) (03/06/25 1152)  Pulse: 71 (03/06/25 1152)  Resp: 18 (03/06/25 1152)  BP: 137/62 (03/06/25 1152)  SpO2: 96 % (03/06/25 1152) Vital Signs (24h Range):  Temp:  [96.4 °F (35.8 °C)-98.5 °F (36.9 °C)] 97.3 °F (36.3 °C)  Pulse:  [65-86] 71  Resp:  [14-19] 18  SpO2:  [94 %-100 %] 96 %  BP: (122-164)/(55-74) 137/62     Weight: 65.8 kg (145 lb 1 oz)  Body mass index is 23.41 kg/m².    Intake/Output Summary (Last 24 hours) at 3/6/2025 1548  Last data filed at 3/6/2025 0951  Gross per 24 hour   Intake 1140 ml   Output --   Net 1140 ml         Physical Exam  HENT:      Nose:      Right Nostril: No epistaxis.      Left Nostril: No epistaxis.      Comments: Nasal packing inside  Eyes:      Comments: Pale palpebra   Cardiovascular:      Rate and Rhythm: Normal rate.      Heart sounds: Murmur heard.   Pulmonary:      Effort: No respiratory distress.      Breath sounds: Rhonchi present.      Comments: Productive cough present  Neurological:      Mental Status: He is alert and oriented to person, place, and time. Mental status is at baseline.      Cranial Nerves: No cranial nerve deficit.             Significant Labs: All pertinent labs within the past 24 hours have been reviewed.    Significant Imaging: I have reviewed all pertinent imaging results/findings within the past 24 hours.      Assessment & Plan  Acute blood loss  anemia  Anemia is likely due to acute blood loss which was from Epistaxis related to warfarin . Most recent hemoglobin and hematocrit are listed below.  Recent Labs     03/04/25  0811 03/05/25  0254   HGB 7.5* 7.5*   HCT 24.9* 24.9*     Plan  - If pt has bleed again, afrin instructions per ENT.   - Monitor serial CBC: Every 12 hours  - Transfuse PRBC if patient becomes hemodynamically unstable, symptomatic or H/H drops below 7/21.      Chronic anticoagulation  Initially held warfarin due to supratherapeutic INR. Now in therapeutic goal.     Plan  - Warfarin management per pharmacy  - Monitor PT-INR    Hyperlipidemia associated with type 2 diabetes mellitus    Continue home statin  Type 2 diabetes mellitus with stage 4 chronic kidney disease, without long-term current use of insulin  Creatine stable for now. BMP reviewed- noted Estimated Creatinine Clearance: 26.6 mL/min (A) (based on SCr of 1.9 mg/dL (H)). according to latest data. 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  Monitor UOP and serial BMP and adjust therapy as needed. Renally dose meds. Avoid nephrotoxic medications and procedures.    Plan  - Low dose sliding scale  -Holding allopurinol  H/O mechanical aortic valve replacement  Resuming warfarin    History of epistaxis  Pt with chronic nosebleeds. Had multiple cauterizations in the past. ENT consulted. On warfarin. Has nasal packing due to rebleeds.     - ENT consulted, appreciate recs  - Cauterization with ENT post-poned on 2/26 d/t supratherapeutic INR and developing PNA   - Cauterization on 3/6, planned.  - On Doxy for MRSA coverage    Right lower lobe pneumonia  Wheezes present on PE. Concerning for developing infection, at least post viral pneumonia. Not on O2.    Plan  -Started on ceftriaxone/doxy    VTE Risk Mitigation (From admission, onward)           Ordered     warfarin (COUMADIN) tablet 2.5 mg  Daily         03/05/25 0959     Reason for No Pharmacological VTE Prophylaxis  Once         Question:  Reasons:  Answer:  Active Bleeding    02/24/25 1421     IP VTE HIGH RISK PATIENT  Once         02/24/25 1421     Place sequential compression device  Until discontinued         02/24/25 1421                    Discharge Planning   ASTER: 3/7/2025     Code Status: Full Code   Medical Readiness for Discharge Date:   Discharge Plan A: Home   Discharge Delays: None known at this time                    Puma Arvizu MD  Department of Hospital Medicine   [facility]

## 2025-03-06 NOTE — TRANSFER OF CARE
"Anesthesia Transfer of Care Note    Patient: Dariel Urbina    Procedure(s) Performed: Procedure(s) (LRB):  CAUTERIZATION, NOSE, ENDOSCOPIC (Bilateral)    Patient location: PACU    Anesthesia Type: general    Transport from OR: Transported from OR on 6-10 L/min O2 by face mask with adequate spontaneous ventilation    Post pain: adequate analgesia    Post assessment: no apparent anesthetic complications and tolerated procedure well    Post vital signs: stable    Level of consciousness: responds to stimulation    Nausea/Vomiting: no nausea/vomiting    Complications: none    Transfer of care protocol was followed      Last vitals: Visit Vitals  /74 (BP Location: Right arm, Patient Position: Lying)   Pulse 75   Temp 36.4 °C (97.5 °F) (Skin)   Resp 16   Ht 5' 6" (1.676 m)   Wt 65.8 kg (145 lb 1 oz)   SpO2 100%   BMI 23.41 kg/m²     "

## 2025-03-06 NOTE — ANESTHESIA POSTPROCEDURE EVALUATION
Anesthesia Post Evaluation    Patient: Dariel Urbina    Procedure(s) Performed: Procedure(s) (LRB):  CAUTERIZATION, NOSE, ENDOSCOPIC (Bilateral)    Final Anesthesia Type: general      Patient location during evaluation: PACU  Patient participation: Yes- Able to Participate  Level of consciousness: awake and alert and oriented  Post-procedure vital signs: reviewed and stable  Pain management: adequate  Airway patency: patent    PONV status at discharge: No PONV  Anesthetic complications: no      Cardiovascular status: hemodynamically stable  Respiratory status: unassisted, spontaneous ventilation and room air  Hydration status: euvolemic  Follow-up not needed.              Vitals Value Taken Time   /62 03/06/25 11:52   Temp 36.3 °C (97.3 °F) 03/06/25 11:52   Pulse 71 03/06/25 11:52   Resp 18 03/06/25 11:52   SpO2 96 % 03/06/25 11:52         Event Time   Out of Recovery 03/06/2025 10:30:00         Pain/Sanjeev Score: Sanjeev Score: 9 (3/6/2025 10:30 AM)

## 2025-03-06 NOTE — ANESTHESIA PROCEDURE NOTES
Intubation    Date/Time: 3/6/2025 9:14 AM    Performed by: Tiffanie Ashley CRNA  Authorized by: Derick Farrell MD    Intubation:     Induction:  Intravenous    Intubated:  Postinduction    Mask Ventilation:  Not attempted    Attempts:  1    Attempted By:  CRNA    Method of Intubation:  Video laryngoscopy    Blade:  Osorio 3    Laryngeal View Grade: Grade I - full view of cords      Difficult Airway Encountered?: No      Complications:  None    Airway Device:  Oral endotracheal tube    Airway Device Size:  7.5    Style/Cuff Inflation:  Cuffed    Inflation Amount (mL):  8    Tube secured:  21    Secured at:  The lips    Placement Verified By:  Capnometry    Complicating Factors:  None    Findings Post-Intubation:  BS equal bilateral and atraumatic/condition of teeth unchanged

## 2025-03-06 NOTE — ASSESSMENT & PLAN NOTE
Pt with chronic nosebleeds. Had multiple cauterizations in the past. ENT consulted. On warfarin. Has nasal packing due to rebleeds.     - ENT consulted, appreciate recs  - Cauterization with ENT post-poned on 2/26 d/t supratherapeutic INR and developing PNA   - Cauterization on 3/6, planned.  - On Doxy for MRSA coverage     Will repeat BMP tomorrow as last Cr went up to 2 02

## 2025-03-06 NOTE — PLAN OF CARE
Problem: Adult Inpatient Plan of Care  Goal: Plan of Care Review  Outcome: Progressing  Goal: Patient-Specific Goal (Individualized)  Outcome: Progressing  Goal: Absence of Hospital-Acquired Illness or Injury  Outcome: Progressing  Goal: Optimal Comfort and Wellbeing  Outcome: Progressing  Goal: Readiness for Transition of Care  Outcome: Progressing     Problem: Diabetes Comorbidity  Goal: Blood Glucose Level Within Targeted Range  Outcome: Progressing     Problem: Pneumonia  Goal: Fluid Balance  Outcome: Progressing  Goal: Resolution of Infection Signs and Symptoms  Outcome: Progressing  Goal: Effective Oxygenation and Ventilation  Outcome: Progressing     Problem: Pneumonia  Goal: Fluid Balance  Outcome: Progressing

## 2025-03-06 NOTE — PHYSICIAN QUERY
Please clarify conflicting stage of chronic kidney disease (CKD):     Chronic kidney disease (CKD) stage 3b - (eGFR 30-44)

## 2025-03-06 NOTE — NURSING TRANSFER
Nursing Transfer Note      3/6/2025   11:06 AM    Nurse giving handoff:sixto rn   Nurse receiving handoff:med surg rn     Reason patient is being transferred: post procedure    Transfer To: 655      Transfer via stretcher    Transfer with na    Transported by tech    Medicines sent: na    Any special needs or follow-up needed: na    Patient belongings transferred with patient: No    Chart send with patient: Yes    Notified: spouse    Patient reassessed at: 3/6/25 @1100

## 2025-03-06 NOTE — PROGRESS NOTES
[facility]  Otorhinolaryngology-Head & Neck Surgery  Progress Note    Subjective:     Post-Op Info:  Procedure(s) (LRB):  CAUTERIZATION, NOSE, ENDOSCOPIC (N/A)   8 Days Post-Op  Hospital Day: 11     Interval History: No bleeding overnight.    Medications:  Continuous Infusions:  Scheduled Meds:   atorvastatin  80 mg Oral Daily    doxycycline  100 mg Oral Q12H    isosorbide mononitrate  60 mg Oral QHS    polyethylene glycol  17 g Oral Daily    sodium chloride  2 spray Each Nostril TID    traZODone  50 mg Oral QHS    warfarin  2.5 mg Oral Daily     PRN Meds:  Current Facility-Administered Medications:     albuterol-ipratropium, 3 mL, Nebulization, Q6H PRN    dextromethorphan-guaiFENesin  mg/5 ml, 5 mL, Oral, Q4H PRN    dextrose 50%, 12.5 g, Intravenous, PRN    dextrose 50%, 25 g, Intravenous, PRN    glucagon (human recombinant), 1 mg, Intramuscular, PRN    glucose, 16 g, Oral, PRN    glucose, 24 g, Oral, PRN    insulin aspart U-100, 0-5 Units, Subcutaneous, QID (AC + HS) PRN    naloxone, 0.02 mg, Intravenous, PRN    senna, 8.6 mg, Oral, Daily PRN    sodium chloride 0.9%, 10 mL, Intravenous, Q12H PRN     Review of patient's allergies indicates:   Allergen Reactions    Lisinopril     Losartan      Intolerance- elevates potassium level     Objective:     Vital Signs (24h Range):  Temp:  [96.4 °F (35.8 °C)-98.5 °F (36.9 °C)] 97.3 °F (36.3 °C)  Pulse:  [] 67  Resp:  [16-18] 18  SpO2:  [87 %-99 %] 99 %  BP: (122-147)/(55-66) 122/58       Lines/Drains/Airways       Peripheral Intravenous Line  Duration                  Peripheral IV - Single Lumen 03/02/25 0536 20 G Anterior;Proximal;Right Forearm 4 days                     Physical Exam  Bilateral nasal packs in place  No bleeding from nose or into oropharynx     Significant Labs:  CBC:   Recent Labs   Lab 03/05/25  0254   WBC 4.87   RBC 2.65*   HGB 7.5*   HCT 24.9*      MCV 94   MCH 28.3   MCHC 30.1*       Significant  Diagnostics:  None  Assessment/Plan:     History of epistaxis  84 y.o. with history of recurrent epistaxis, on Coumadin for heart valve, who is s/p bilateral SP ligation and bilateral embolization and multiple cauterizations by ENT. He presents due to epistaxis 2/24/25. He was hemostatic in the ER. Hgb was 6.7, s/p 1u pRBC in the ER. Admitted to  for observation      - Patient hemostatic this morning  - Plan for nasal endoscopy in OR Thursday 3/6  - Saline spray  - Abx while packing in place - anti staph coverage  - Keep head of bed elevated  - Please call ENT with questions  - Remainder of care per primary team        Yenifer Sandoval MD  Otorhinolaryngology-Head & Neck Surgery  [facility] - Med Surg

## 2025-03-06 NOTE — ASSESSMENT & PLAN NOTE
84 y.o. with history of recurrent epistaxis, on Coumadin for heart valve, who is s/p bilateral SP ligation and bilateral embolization and multiple cauterizations by ENT. He presents due to epistaxis 2/24/25. He was hemostatic in the ER. Hgb was 6.7, s/p 1u pRBC in the ER. Admitted to  for observation      - Patient hemostatic this morning  - Plan for nasal endoscopy in OR Thursday 3/6  - Saline spray  - Abx while packing in place - anti staph coverage  - Keep head of bed elevated  - Please call ENT with questions  - Remainder of care per primary team

## 2025-03-06 NOTE — OP NOTE
DATE OF PROCEDURE: 3/6/2025     PREOPERATIVE DIAGNOSES:   Epistaxis [R04.0]    POSTOPERATIVE DIAGNOSES:   Epistaxis [R04.0]    SURGEON:  Surgeons and Role:     * Jono Russell MD - Primary    PROCEDURES PERFORMED:   Nasal endoscopy with control of epistaxis, bilaterally.    ANESTHESIA: General    INDICATIONS FOR PROCEDURE:   Dariel Urbian is a 84 y.o. well known to me.  Has a history of multiple episodes of epistaxis.  He had previously undergone bilateral sphenopalatine artery ligation, bilateral facial and internal maxillary artery embolizations, left anterior ethmoid artery ligation along with several previous endoscopy procedure were control of the epistaxis.  He then re-presented with the anemia and report of left-sided oozing.  He was packed bilaterally given his septal perforation.     He was apprised of the risks, benefits and alternatives to surgery.  In spite of the risk inherent to surgery,he provided informed consent for the aforementioned procedures.     PROCEDURE IN DETAIL:  The patient was taken to the operating room and placed on the operating table in the supine position.  General endotracheal anesthesia was induced by the anesthesia team.     The patient was prepped and draped in usual sterile fashion.  He had bilateral nasal packs placed.  The 1st pack on the right was removed.  There was no significant amount of bleeding noted.  The left-sided nasal pack was then removed.  The zero-degree endoscope was then used to assess the left and right nasal cavity.  There was diffuse mucosal oozing noted along the left mucosa.  A Coblator was then used to cauterize the diffuse mucosal oozing all of the left nasal septum.  Attention was then turned to the right nasal cavity.  Some small prominent vessels noted in the right lateral nasal sidewall.  These were similarly cauterized using a Coblator.    The nose was then copiously irrigated.  Blood products and removed in the bilateral maxillary  sinuses. .  Puragel was then placed along the left nasal septum to assist was healing.  The patient was then awoken from anesthesia.    There were no intraoperative complications.  I was present for and participated in the entire procedure as dictated above.       ESTIMATED BLOOD LOSS: 5cc    SPECIMENS:   Specimen (24h ago, onward)      None          Jono Russell MD

## 2025-03-06 NOTE — ASSESSMENT & PLAN NOTE
Creatine stable for now. BMP reviewed- noted Estimated Creatinine Clearance: 26.6 mL/min (A) (based on SCr of 1.9 mg/dL (H)). according to latest data. 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  Monitor UOP and serial BMP and adjust therapy as needed. Renally dose meds. Avoid nephrotoxic medications and procedures.    Plan  - Low dose sliding scale  -Holding allopurinol

## 2025-03-06 NOTE — ASSESSMENT & PLAN NOTE
Anemia is likely due to acute blood loss which was from Epistaxis related to warfarin. Most recent hemoglobin and hematocrit are listed below.  Recent Labs     03/04/25  0811 03/05/25  0254   HGB 7.5* 7.5*   HCT 24.9* 24.9*     Plan  - If pt has bleed again, afrin instructions per ENT.   - Monitor serial CBC: Every 12 hours  - Transfuse PRBC if patient becomes hemodynamically unstable, symptomatic or H/H drops below 7/21.

## 2025-03-07 VITALS
OXYGEN SATURATION: 97 % | BODY MASS INDEX: 23.31 KG/M2 | RESPIRATION RATE: 17 BRPM | HEIGHT: 66 IN | WEIGHT: 145.06 LBS | TEMPERATURE: 96 F | DIASTOLIC BLOOD PRESSURE: 63 MMHG | HEART RATE: 60 BPM | SYSTOLIC BLOOD PRESSURE: 136 MMHG

## 2025-03-07 LAB
ALBUMIN SERPL BCP-MCNC: 2.6 G/DL (ref 3.5–5.2)
ALP SERPL-CCNC: 72 U/L (ref 40–150)
ALT SERPL W/O P-5'-P-CCNC: 6 U/L (ref 10–44)
ANION GAP SERPL CALC-SCNC: 4 MMOL/L (ref 8–16)
AST SERPL-CCNC: 17 U/L (ref 10–40)
BASOPHILS # BLD AUTO: 0.05 K/UL (ref 0–0.2)
BASOPHILS NFR BLD: 1 % (ref 0–1.9)
BILIRUB SERPL-MCNC: 0.8 MG/DL (ref 0.1–1)
BUN SERPL-MCNC: 38 MG/DL (ref 8–23)
CALCIUM SERPL-MCNC: 9.3 MG/DL (ref 8.7–10.5)
CHLORIDE SERPL-SCNC: 114 MMOL/L (ref 95–110)
CO2 SERPL-SCNC: 18 MMOL/L (ref 23–29)
CREAT SERPL-MCNC: 1.9 MG/DL (ref 0.5–1.4)
DIFFERENTIAL METHOD BLD: ABNORMAL
EOSINOPHIL # BLD AUTO: 0.2 K/UL (ref 0–0.5)
EOSINOPHIL NFR BLD: 3.3 % (ref 0–8)
ERYTHROCYTE [DISTWIDTH] IN BLOOD BY AUTOMATED COUNT: 14.9 % (ref 11.5–14.5)
EST. GFR  (NO RACE VARIABLE): 34.6 ML/MIN/1.73 M^2
GLUCOSE SERPL-MCNC: 67 MG/DL (ref 70–110)
HCT VFR BLD AUTO: 25.5 % (ref 40–54)
HGB BLD-MCNC: 8 G/DL (ref 14–18)
IMM GRANULOCYTES # BLD AUTO: 0.03 K/UL (ref 0–0.04)
IMM GRANULOCYTES NFR BLD AUTO: 0.6 % (ref 0–0.5)
INR PPP: 2.7 (ref 0.8–1.2)
LYMPHOCYTES # BLD AUTO: 0.8 K/UL (ref 1–4.8)
LYMPHOCYTES NFR BLD: 15.1 % (ref 18–48)
MAGNESIUM SERPL-MCNC: 1.7 MG/DL (ref 1.6–2.6)
MCH RBC QN AUTO: 29.1 PG (ref 27–31)
MCHC RBC AUTO-ENTMCNC: 31.4 G/DL (ref 32–36)
MCV RBC AUTO: 93 FL (ref 82–98)
MONOCYTES # BLD AUTO: 0.6 K/UL (ref 0.3–1)
MONOCYTES NFR BLD: 10.6 % (ref 4–15)
NEUTROPHILS # BLD AUTO: 3.6 K/UL (ref 1.8–7.7)
NEUTROPHILS NFR BLD: 69.4 % (ref 38–73)
NRBC BLD-RTO: 0 /100 WBC
PHOSPHATE SERPL-MCNC: 2.8 MG/DL (ref 2.7–4.5)
PLATELET # BLD AUTO: 162 K/UL (ref 150–450)
PMV BLD AUTO: 10.4 FL (ref 9.2–12.9)
POCT GLUCOSE: 81 MG/DL (ref 70–110)
POCT GLUCOSE: 89 MG/DL (ref 70–110)
POTASSIUM SERPL-SCNC: 5 MMOL/L (ref 3.5–5.1)
PROT SERPL-MCNC: 5.8 G/DL (ref 6–8.4)
PROTHROMBIN TIME: 28 SEC (ref 9–12.5)
RBC # BLD AUTO: 2.75 M/UL (ref 4.6–6.2)
SODIUM SERPL-SCNC: 136 MMOL/L (ref 136–145)
WBC # BLD AUTO: 5.17 K/UL (ref 3.9–12.7)

## 2025-03-07 PROCEDURE — 83735 ASSAY OF MAGNESIUM: CPT | Mod: HCNC | Performed by: STUDENT IN AN ORGANIZED HEALTH CARE EDUCATION/TRAINING PROGRAM

## 2025-03-07 PROCEDURE — 36415 COLL VENOUS BLD VENIPUNCTURE: CPT | Mod: HCNC | Performed by: STUDENT IN AN ORGANIZED HEALTH CARE EDUCATION/TRAINING PROGRAM

## 2025-03-07 PROCEDURE — 80053 COMPREHEN METABOLIC PANEL: CPT | Mod: HCNC | Performed by: STUDENT IN AN ORGANIZED HEALTH CARE EDUCATION/TRAINING PROGRAM

## 2025-03-07 PROCEDURE — 94664 DEMO&/EVAL PT USE INHALER: CPT | Mod: HCNC

## 2025-03-07 PROCEDURE — 84100 ASSAY OF PHOSPHORUS: CPT | Mod: HCNC | Performed by: STUDENT IN AN ORGANIZED HEALTH CARE EDUCATION/TRAINING PROGRAM

## 2025-03-07 PROCEDURE — 85610 PROTHROMBIN TIME: CPT | Mod: HCNC | Performed by: STUDENT IN AN ORGANIZED HEALTH CARE EDUCATION/TRAINING PROGRAM

## 2025-03-07 PROCEDURE — 25000003 PHARM REV CODE 250: Mod: HCNC | Performed by: STUDENT IN AN ORGANIZED HEALTH CARE EDUCATION/TRAINING PROGRAM

## 2025-03-07 PROCEDURE — 85025 COMPLETE CBC W/AUTO DIFF WBC: CPT | Mod: HCNC | Performed by: STUDENT IN AN ORGANIZED HEALTH CARE EDUCATION/TRAINING PROGRAM

## 2025-03-07 PROCEDURE — 94799 UNLISTED PULMONARY SVC/PX: CPT | Mod: HCNC

## 2025-03-07 PROCEDURE — 25000003 PHARM REV CODE 250: Mod: HCNC

## 2025-03-07 RX ORDER — WARFARIN 1 MG/1
1 TABLET ORAL ONCE
Status: DISCONTINUED | OUTPATIENT
Start: 2025-03-07 | End: 2025-03-07

## 2025-03-07 RX ORDER — WARFARIN 1 MG/1
1 TABLET ORAL ONCE
Status: COMPLETED | OUTPATIENT
Start: 2025-03-07 | End: 2025-03-07

## 2025-03-07 RX ORDER — TRAZODONE HYDROCHLORIDE 50 MG/1
50 TABLET ORAL NIGHTLY
Start: 2025-03-07 | End: 2025-03-10

## 2025-03-07 RX ORDER — WARFARIN SODIUM 5 MG/1
TABLET ORAL
Start: 2025-03-08

## 2025-03-07 RX ORDER — TRAZODONE HYDROCHLORIDE 50 MG/1
50 TABLET ORAL NIGHTLY
Status: CANCELLED
Start: 2025-03-07

## 2025-03-07 RX ORDER — WARFARIN 2.5 MG/1
2.5 TABLET ORAL DAILY
Status: DISCONTINUED | OUTPATIENT
Start: 2025-03-08 | End: 2025-03-07 | Stop reason: HOSPADM

## 2025-03-07 RX ORDER — BUMETANIDE 1 MG/1
1 TABLET ORAL DAILY
Status: CANCELLED
Start: 2025-03-07

## 2025-03-07 RX ORDER — WARFARIN 1 MG/1
1 TABLET ORAL ONCE
Qty: 1 TABLET | Refills: 0 | Status: SHIPPED | OUTPATIENT
Start: 2025-03-07 | End: 2025-03-07 | Stop reason: HOSPADM

## 2025-03-07 RX ORDER — BUMETANIDE 1 MG/1
1 TABLET ORAL DAILY
Start: 2025-03-07

## 2025-03-07 RX ORDER — WARFARIN SODIUM 5 MG/1
TABLET ORAL
Start: 2025-03-07 | End: 2025-03-07

## 2025-03-07 RX ORDER — WARFARIN 1 MG/1
1 TABLET ORAL DAILY
Status: DISCONTINUED | OUTPATIENT
Start: 2025-03-07 | End: 2025-03-07

## 2025-03-07 RX ADMIN — ATORVASTATIN CALCIUM 80 MG: 40 TABLET, FILM COATED ORAL at 09:03

## 2025-03-07 RX ADMIN — WARFARIN SODIUM 1 MG: 1 TABLET ORAL at 12:03

## 2025-03-07 NOTE — DISCHARGE INSTRUCTIONS
Our goal at [facility] is to always give you outstanding care and exceptional service. You may receive a survey from [employer] by mail, text or e-mail in the next 24-48 hours asking about the care you received with us. The survey should only take 5-10 minutes to complete and is very important to us.     Your feedback provides us with a way to recognize our staff who work tirelessly to provide the best care! Also, your responses help us learn how to improve when your experience was below our aspiration of excellence. We are always looking for ways to improve your stay. We WILL use your feedback to continue making improvements to help us provide the highest quality care. We keep your personal information and feedback confidential. We appreciate your time completing this survey and can't wait to hear from you!!!    We look forward to your continued care with us! Thanks so much for choosing [facility] for your healthcare needs!

## 2025-03-07 NOTE — ASSESSMENT & PLAN NOTE
Anemia is likely due to acute blood loss which was from Epistaxis related to warfarin. Most recent hemoglobin and hematocrit are listed below.  Recent Labs     03/05/25  0254 03/06/25  1708 03/07/25  0316   HGB 7.5* 8.2* 8.0*   HCT 24.9* 26.8* 25.5*     Plan  - If pt has bleed again, afrin instructions per ENT.   - Monitor serial CBC: Every 12 hours  - Transfuse PRBC if patient becomes hemodynamically unstable, symptomatic or H/H drops below 7/21.

## 2025-03-07 NOTE — PLAN OF CARE
[facility]  Discharge Final Note    Primary Care Provider: Devon Langston Jr., MD    Expected Discharge Date: 3/7/2025    Final Discharge Note (most recent)       Final Note - 03/07/25 1346          Final Note    Assessment Type Final Discharge Note (P)      Anticipated Discharge Disposition Home or Self Care (P)      What phone number can be called within the next 1-3 days to see how you are doing after discharge? [phone] (P)         Post-Acute Status    Coverage [employer] Managed  Medicare (P)      Discharge Delays None known at this time (P)                      Important Message from Medicare             Contact Info       Devon Langston Jr., MD   Specialty: Internal Medicine   Relationship: PCP - General    [address]  [facility] [address]   Phone: [phone]       Next Steps: Follow up          Pt discharged home with spouse. Follow up appts added to AVS, no further needs identified at this time.   Discharge Plan A and Plan B have been determined by review of patient's clinical status, future medical and therapeutic needs, and coverage/benefits for post-acute care in coordination with multidisciplinary team members.   Future Appointments   Date Time Provider Department Center   3/10/2025  9:20 AM LAB, APPOINTMENT [facility] [address] [facility]   3/10/2025  2:00 PM Shweta Holland DO [facility] IM [address] [facility]   3/18/2025  3:00 PM Jono Russell MD [facility] HNSO [address]   4/3/2025 11:00 AM Priscilla Loza, NP [facility] NEPHRO [address]   5/1/2025  7:00 AM LAB, APPOINTMENT [facility] IM [address] [facility]   5/8/2025 10:40 AM Devon Langston Jr., MD [facility] IM [address] [facility]   5/12/2025 10:30 AM Usha Muñiz MD [facility] DERM [address]        Maria Del Carmen Singleton, MSN   [facility]  [phone]

## 2025-03-07 NOTE — PLAN OF CARE
Problem: Adult Inpatient Plan of Care  Goal: Plan of Care Review  Outcome: Progressing  Goal: Patient-Specific Goal (Individualized)  Outcome: Progressing  Goal: Absence of Hospital-Acquired Illness or Injury  Outcome: Progressing  Goal: Optimal Comfort and Wellbeing  Outcome: Progressing  Goal: Readiness for Transition of Care  Outcome: Progressing     Problem: Diabetes Comorbidity  Goal: Blood Glucose Level Within Targeted Range  Outcome: Progressing     Problem: Pneumonia  Goal: Fluid Balance  Outcome: Progressing  Goal: Resolution of Infection Signs and Symptoms  Outcome: Progressing  Goal: Effective Oxygenation and Ventilation  Outcome: Progressing     Problem: Wound  Goal: Optimal Coping  Outcome: Progressing  Goal: Optimal Functional Ability  Outcome: Progressing  Goal: Absence of Infection Signs and Symptoms  Outcome: Progressing  Goal: Improved Oral Intake  Outcome: Progressing  Goal: Optimal Pain Control and Function  Outcome: Progressing  Goal: Skin Health and Integrity  Outcome: Progressing  Goal: Optimal Wound Healing  Outcome: Progressing     Problem: Diabetes Comorbidity  Goal: Blood Glucose Level Within Targeted Range  Outcome: Progressing

## 2025-03-07 NOTE — NURSING
Blood transfusion completed. Piv flushed with NS. No adverse reaction noted. VSS. Safety and monitoring ongoing.

## 2025-03-07 NOTE — NURSING
Status stable and unchanged, slept well, had an uneventful night. No bleeding noted. Continues to rest quietly in bed. Safety maintained.

## 2025-03-07 NOTE — ASSESSMENT & PLAN NOTE
4 Eyes Skin Assessment     The patient is being assess for   Admission    I agree that 2 RN's have performed a thorough Head to Toe Skin Assessment on the patient. ALL assessment sites listed below have been assessed. Areas assessed for pressure by both nurses:   [x]   Head, Face, and Ears   [x]   Shoulders, Back, and Chest, Abdomen     Arms, Elbows, and Hands   [][x]   Coccyx, Sacrum, and Ischium  [x]   Legs, Feet, and Heels        Skin Assessed Under all Medical Devices by both nurses:  O2 device tubing    vu           All Mepilex Borders were peeled back and area peeked at by both nurses:  No:    Please list where Mepilex Borders are located:  na             **SHARE this note so that the co-signing nurse is able to place an eSignature**    Co-signer eSignature: Electronically signed by Juanpablo Cooper RN on 1/14/23 at 5:34 AM EST    Does the Patient have Skin Breakdown related to pressure?   No     (         [facility] Prevention initiated:  Yes   Wound Care Orders initiated:  NA      [facility] nurse consulted for Pressure Injury (Stage 3,4, Unstageable, DTI, NWPT, Complex wounds)and New or Established Ostomies:  NA      Primary Nurse eSignature: Electronically signed by Rk Silva RN on 1/14/23 at 5:30 AM EST Initially held warfarin due to supratherapeutic INR. Now in therapeutic goal.     Plan  - Warfarin management per pharmacy  - Monitor PT-INR

## 2025-03-07 NOTE — DISCHARGE SUMMARY
[facility] Medicine  Discharge Summary      Patient Name: Dariel Urbina  MRN: [id number]  EMELIA: [id number]  Patient Class: IP- Inpatient  Admission Date: 2/24/2025  Hospital Length of Stay: 10 days  Discharge Date and Time:  03/07/2025 10:33 AM  Attending Physician: Nikkie Monsivais MD   Discharging Provider: Puma Arvizu MD  Primary Care Provider: Devon Langston Jr., MD  [facility] Medicine Team: [place] [id number] Puma Arvizu MD  Primary Care Team: [place] [id number]    HPI:   84 y.o. male with  paroxysmal afib, mechanical aortic valve replacement on Coumadin, CHF, CKD stage 3, T2DM, HTN, recurrent epistaxis s/p multiple cauterizations who is admitted to hospital medicine for acute blood loss anemia 2/2 to profound epistaxis.     Pt noted to have intermittent nosebleeds since prior cauterization which resolve with pressure and afrin. Usually has 1-3 epistaxis episodes a day that last for 1-2 hours. But this past week they have worsened in severity and have been more difficult to control with pressure and Afrin. Today morning 2 AM, he had a large episode of epistaxis that wouldn't resolve with Afrin and pressure till about 11:30 AM prior to arrival at ED. Denies any trauma to the area or any specific triggering events. He reports this past week he has had cough, sore throat, and congestion. He denies any fatigue, dizziness, chest pain, or abdominal pain.     ED Course: XR chest unremarkable, Hgb 6.7, Hct 22.2 - 1 unit pRBC given    Procedure(s) (LRB):  CAUTERIZATION, NOSE, ENDOSCOPIC (Bilateral)      Hospital Course:   84 y.o. male with  paroxysmal afib, mechanical aortic valve replacement on Coumadin, CHF, CKD stage 3, T2DM, HTN, recurrent epistaxis s/p multiple cauterizations who is admitted to hospital medicine for acute blood loss anemia 2/2 to profound epistaxis. On admission, HgB- 6.7, received 1 unit of pRBCs. Repeat HgB s/p transfusion is 7.2. PT-INR goal per coumadin clinic is 2.2.5.  Has wheezes on physical exam which is new. Covid/Flu/RSV panel unremarkable CXR concerning for possible infectious process. ENT planned for cauterization on 2/26, however pt wheezing worsened and it was decided to postpone surgery. Started on Ceftriaxone/Doxy for CAP.  Monitoring Pt-INR. Started on Warfarin 5 mg but PT-INR subtherapeutic now. Started on Heparin to bridge, but pt experienced another episode of epistaxis on 3/1. Heparin discontinued. Increased warfarin to 5 mg daily due to subtherapeutic INR and subsequently decreased to 2.5 mg. Underwent cauterization by ENT on 3/6, doing well. Transfused 1 post procedure, repeat HgB-8. Pt doing well over all s/p procedure. He is stable for discharge. Will discharge pt with Coumadin 2.5 mg till follow up with Warfarin clinic.      Goals of Care Treatment Preferences:  Code Status: Full Code          What is most important right now is to focus on remaining as independent as possible, symptom/pain control, extending life as long as possible, even it it means sacrificing quality.  Accordingly, we have decided that the best plan to meet the patient's goals includes continuing with treatment.      SDOH Screening:  The patient was screened for utility difficulties, food insecurity, transport difficulties, housing insecurity, and interpersonal safety and there were no concerns identified this admission.     Consults:   Consults (From admission, onward)          Status Ordering Provider     Inpatient consult to ENT  Once        Provider:  (Not yet assigned)    Completed DEYANIRA BUCKLEY            Assessment & Plan  Acute blood loss anemia  Anemia is likely due to acute blood loss which was from Epistaxis related to warfarin . Most recent hemoglobin and hematocrit are listed below.  Recent Labs     03/05/25  0254 03/06/25  1708 03/07/25  0316   HGB 7.5* 8.2* 8.0*   HCT 24.9* 26.8* 25.5*     Plan  - If pt has bleed again, afrin instructions per ENT.   - Monitor serial CBC:  Every 12 hours  - Transfuse PRBC if patient becomes hemodynamically unstable, symptomatic or H/H drops below 7/21.      Chronic anticoagulation  Initially held warfarin due to supratherapeutic INR. Now in therapeutic goal.     Plan  - Warfarin management per pharmacy  - Monitor PT-INR    Hyperlipidemia associated with type 2 diabetes mellitus    Continue home statin  Type 2 diabetes mellitus with stage 4 chronic kidney disease, without long-term current use of insulin  Creatine stable for now. BMP reviewed- noted Estimated Creatinine Clearance: 26.6 mL/min (A) (based on SCr of 1.9 mg/dL (H)). according to latest data. 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  Monitor UOP and serial BMP and adjust therapy as needed. Renally dose meds. Avoid nephrotoxic medications and procedures.    Plan  - Low dose sliding scale  -Holding allopurinol  H/O mechanical aortic valve replacement  Resuming warfarin    History of epistaxis  Pt with chronic nosebleeds. Had multiple cauterizations in the past. ENT consulted. On warfarin. Has nasal packing due to rebleeds.     - ENT consulted, appreciate recs  - Cauterization with ENT post-poned on 2/26 d/t supratherapeutic INR and developing PNA   - Cauterization on 3/6, planned.  - On Doxy for MRSA coverage    Right lower lobe pneumonia  Wheezes present on PE. Concerning for developing infection, at least post viral pneumonia. Not on O2.    Plan  -Started on ceftriaxone/doxy    Final Active Diagnoses:    Diagnosis Date Noted POA    PRINCIPAL PROBLEM:  Acute blood loss anemia [D62] 12/21/2022 Yes    History of epistaxis [Z87.898] 02/24/2025 Not Applicable    Right lower lobe pneumonia [J18.9] 02/24/2025 Yes    H/O mechanical aortic valve replacement [Z95.2] 09/17/2014 Not Applicable    Type 2 diabetes mellitus with stage 4 chronic kidney disease, without long-term current use of insulin [E11.22, N18.4] 10/02/2013 Yes     Chronic    Chronic anticoagulation [Z79.01] 09/26/2012  Not Applicable    Hyperlipidemia associated with type 2 diabetes mellitus [E11.69, E78.5] 09/26/2012 Yes      Problems Resolved During this Admission:       Discharged Condition: stable    Disposition: Home or Self Care    Follow Up:    Patient Instructions:   No discharge procedures on file.    Significant Diagnostic Studies: N/A    Pending Diagnostic Studies:       Procedure Component Value Units Date/Time    CBC with Automated Differential [[id number]] Collected: 03/06/25 0452    Order Status: Sent Lab Status: No result     Specimen: Blood            Medications:  Reconciled Home Medications:      Medication List        CHANGE how you take these medications      isosorbide mononitrate 60 MG 24 hr tablet  Commonly known as: IMDUR  Take 1 tablet (60 mg total) by mouth every evening.  What changed: when to take this     warfarin 5 MG tablet  Commonly known as: COUMADIN  Take 2.5 mg (one-half tablet) daily until you follow up with coumadin clinic  What changed: additional instructions            CONTINUE taking these medications      acetaminophen 500 MG tablet  Commonly known as: TYLENOL  Take 500 mg by mouth daily as needed for Pain.     albuterol 90 mcg/actuation inhaler  Commonly known as: VENTOLIN HFA  Inhale 2 puffs into the lungs every 6 (six) hours as needed for Wheezing. Rescue     allopurinoL 100 MG tablet  Commonly known as: ZYLOPRIM  Take 1 tablet (100 mg total) by mouth once daily.     bumetanide 1 MG tablet  Commonly known as: BUMEX  Take 1 tablet (1 mg total) by mouth once daily. Patient takes 2 tablets as needed     DEEP SEA NASAL 0.65 % nasal spray  Generic drug: sodium chloride  Use 2 sprays by Nasal route every 4 hours. Apply into nasal cavities daily with nightly application of vaseline/aquaphor to anterior nares. Keep head of bed elevated.     ferrous gluconate 324 MG tablet  Commonly known as: FERGON  TAKE 1 TABLET EVERY DAY WITH BREAKFAST     fish oil-omega-3 fatty acids 300-1,000 mg  capsule  Take 1 capsule by mouth once daily.     NASAL DECONGESTANT (OXYMETAZL) 0.05 % nasal spray  Generic drug: oxymetazoline  2 sprays by Nasal route as needed (nose bleeds).     rosuvastatin 40 MG Tab  Commonly known as: CRESTOR  TAKE 1 TABLET EVERY EVENING.     traZODone 50 MG tablet  Commonly known as: DESYREL  Take 1 tablet (50 mg total) by mouth every evening. As needed for insomnia              Indwelling Lines/Drains at time of discharge:   Lines/Drains/Airways       None                   Time spent on the discharge of patient: 35 minutes         Pmua Arvizu MD  Department of Hospital Medicine  [facility] Surg

## 2025-03-10 ENCOUNTER — LAB VISIT (OUTPATIENT)
Dept: LAB | Facility: HOSPITAL | Age: 85
End: 2025-03-10
Attending: INTERNAL MEDICINE
Payer: MEDICARE

## 2025-03-10 ENCOUNTER — OUTPATIENT CASE MANAGEMENT (OUTPATIENT)
Dept: ADMINISTRATIVE | Facility: OTHER | Age: 85
End: 2025-03-10
Payer: MEDICARE

## 2025-03-10 ENCOUNTER — OFFICE VISIT (OUTPATIENT)
Dept: INTERNAL MEDICINE | Facility: CLINIC | Age: 85
End: 2025-03-10
Payer: MEDICARE

## 2025-03-10 VITALS
HEART RATE: 72 BPM | WEIGHT: 147.25 LBS | BODY MASS INDEX: 23.66 KG/M2 | SYSTOLIC BLOOD PRESSURE: 130 MMHG | DIASTOLIC BLOOD PRESSURE: 40 MMHG | OXYGEN SATURATION: 96 % | HEIGHT: 66 IN

## 2025-03-10 DIAGNOSIS — E11.22 TYPE 2 DIABETES MELLITUS WITH STAGE 4 CHRONIC KIDNEY DISEASE, WITHOUT LONG-TERM CURRENT USE OF INSULIN: Primary | ICD-10-CM

## 2025-03-10 DIAGNOSIS — N18.4 TYPE 2 DIABETES MELLITUS WITH STAGE 4 CHRONIC KIDNEY DISEASE, WITHOUT LONG-TERM CURRENT USE OF INSULIN: Primary | ICD-10-CM

## 2025-03-10 DIAGNOSIS — D62 ACUTE BLOOD LOSS ANEMIA: ICD-10-CM

## 2025-03-10 DIAGNOSIS — E44.0 MODERATE MALNUTRITION: ICD-10-CM

## 2025-03-10 DIAGNOSIS — R04.0 RECURRENT EPISTAXIS: ICD-10-CM

## 2025-03-10 DIAGNOSIS — N18.4 CHRONIC KIDNEY DISEASE, STAGE 4 (SEVERE): ICD-10-CM

## 2025-03-10 DIAGNOSIS — Z95.2 H/O MECHANICAL AORTIC VALVE REPLACEMENT: ICD-10-CM

## 2025-03-10 LAB
INR PPP: 2.3 (ref 0.8–1.2)
PROTHROMBIN TIME: 23.8 SEC (ref 9–12.5)

## 2025-03-10 PROCEDURE — 36415 COLL VENOUS BLD VENIPUNCTURE: CPT | Mod: HCNC

## 2025-03-10 PROCEDURE — 85610 PROTHROMBIN TIME: CPT | Mod: HCNC

## 2025-03-10 PROCEDURE — 99999 PR PBB SHADOW E&M-EST. PATIENT-LVL IV: CPT | Mod: PBBFAC,HCNC,GC, | Performed by: STUDENT IN AN ORGANIZED HEALTH CARE EDUCATION/TRAINING PROGRAM

## 2025-03-10 NOTE — PROGRESS NOTES
Subjective     Chief Complaint:    History of Present Illness:  Mr. Dariel Urbina is a 84 y.o. male with a history of CHF, CKD, Afib/Mechanical AVR on Warfarin who presented on 2/24-3/7 for profound epistaxis s/p multiple cauterizations who presents for hospital follow-up. He has had his warfarin dose reduced. No bleeding afterwards. Currently on Warfarin 2.5mg, went to Warfarin clinic today.    Main complaint from patient is decreased taste and appetite. Has been steadily losing weight over the past year and a half. Otherwise without complaints     Wt Readings from Last 10 Encounters:   03/10/25 66.8 kg (147 lb 4.3 oz)   03/06/25 65.8 kg (145 lb 1 oz)   01/10/25 69.3 kg (152 lb 12.5 oz)   01/07/25 67.6 kg (149 lb)   12/30/24 65.8 kg (145 lb)   12/26/24 67.2 kg (148 lb 2.4 oz)   12/19/24 67.3 kg (148 lb 5.9 oz)   12/13/24 67.6 kg (149 lb 0.5 oz)   10/24/24 71.9 kg (158 lb 8.2 oz)   10/08/24 70.3 kg (154 lb 15.7 oz)       Transitional Care Note    Family and/or Caretaker present at visit?  Yes.  Diagnostic tests reviewed/disposition: I have reviewed all completed as well as pending diagnostic tests at the time of discharge.  Disease/illness education: Warfarin education given  Home health/community services discussion/referrals: Patient does not have home health established from hospital visit.  They do not need home health.  If needed, we will set up home health for the patient.   Establishment or re-establishment of referral orders for community resources: No other necessary community resources.   Discussion with other health care providers: No discussion with other health care providers necessary.           Review of Systems   Constitutional:  Positive for weight loss. Negative for chills and fever.   HENT:  Negative for congestion and sore throat.    Respiratory:  Negative for cough and shortness of breath.    Cardiovascular:  Negative for chest pain and leg swelling.   Gastrointestinal:  Negative for  abdominal pain, diarrhea, nausea and vomiting.   Genitourinary:  Negative for dysuria and flank pain.   Musculoskeletal:  Negative for back pain and myalgias.   Neurological:  Negative for weakness and headaches.   Psychiatric/Behavioral:  Negative for depression. The patient is not nervous/anxious.        PAST HISTORY:     Past Medical History:   Diagnosis Date    Anemia of chronic renal failure, stage 3 (moderate) 05/27/2015    Anticoagulant long-term use     Atherosclerosis of coronary artery bypass graft of native heart without angina pectoris 09/11/2012    3-27-18 [facility] Two vessel coronary artery disease.   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    Bilateral carotid artery disease 02/09/2017    Bleeding from the nose     Bleeding nose 03/21/2018    Cancer     Cataract     CHF (congestive heart failure)     CKD (chronic kidney disease) stage 3, GFR 30-59 ml/min 05/27/2015    Claudication of left lower extremity 09/17/2014    Colon polyp     Encounter for blood transfusion     Gastroesophageal reflux disease without esophagitis 03/19/2018    Gastrointestinal hemorrhage associated with intestinal diverticulosis 04/01/2018    Glaucoma     H/O mechanical aortic valve replacement 09/17/2014    History of gout 09/26/2012    Hyperparathyroidism due to renal insufficiency 07/27/2015    Internal hemorrhoid 04/03/2018    Long term current use of anticoagulant therapy 09/26/2012    Mechanical heart valve present     Metabolic acidosis with normal anion gap and bicarbonate losses 03/20/2018    Mixed hyperlipidemia 09/26/2012    NSTEMI (non-ST elevated myocardial infarction) 03/21/2018    Obesity, diabetes, and hypertension syndrome 02/23/2016    Paroxysmal atrial fibrillation 02/06/2023    PVD (peripheral vascular disease) 09/11/2012    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 02/01/2023    vertex scalp    Syncope 09/29/2022    Type 2 diabetes mellitus with  diabetic peripheral angiopathy without gangrene 05/27/2015    Type 2 diabetes mellitus with stage 3 chronic kidney disease, without long-term current use of insulin 10/02/2013    Volume overload 5/30/2024       Past Surgical History:   Procedure Laterality Date    BACK SURGERY      CARDIAC CATHETERIZATION      CARDIAC VALVE REPLACEMENT      CARDIAC VALVE SURGERY      CARPAL TUNNEL RELEASE Right 05/19/2020    Procedure: RELEASE, CARPAL TUNNEL;  Surgeon: Rupesh Norris Jr., MD;  Location: [facility];  Service: Plastics;  Laterality: Right;    CATARACT EXTRACTION Left 11/13/2022        COLON SURGERY      COLONOSCOPY N/A 03/31/2017    Procedure: COLONOSCOPY;  Surgeon: Bruno Raymond MD;  Location: [facility] ENDO (4TH FLR);  Service: Endoscopy;  Laterality: N/A;  Patient's wife requesting date.    COLONOSCOPY N/A 04/03/2018    Procedure: COLONOSCOPY;  Surgeon: Bonifacio Pelletier MD;  Location: [facility] ENDO (2ND FLR);  Service: Endoscopy;  Laterality: N/A;    COLONOSCOPY N/A 08/13/2018    Procedure: COLONOSCOPY;  Surgeon: Kam Barba MD;  Location: [facility] ENDO (2ND FLR);  Service: Endoscopy;  Laterality: N/A;  2nd floor: PA pressure 49; hx of moderate-severe valve disease     per Coumadin clinic-Patient can hold 5 days with lovenox bridge       ok to schedule per Katarina    CONTROL OF EPISTAXIS, POSTERIOR, USING NASAL PACKING OR CAUTERIZATION Bilateral 6/18/2024    Procedure: CONTROL OF EPISTAXIS, POSTERIOR, USING NASAL PACKING OR CAUTERIZATION;  Surgeon: Jono Russell MD;  Location: [facility] OR 2ND FLR;  Service: ENT;  Laterality: Bilateral;    CONTROL OF EPISTAXIS, POSTERIOR, USING NASAL PACKING OR CAUTERIZATION Bilateral 8/8/2024    Procedure: CONTROL OF EPISTAXIS, POSTERIOR, USING NASAL PACKING OR CAUTERIZATION;  Surgeon: Jono Russell MD;  Location: [facility] OR 2ND FLR;  Service: ENT;  Laterality: Bilateral;  suction bovie, nasopore, endoscopes    CONTROL OF EPISTAXIS, POSTERIOR, USING NASAL PACKING OR  CAUTERIZATION Bilateral 12/30/2024    Procedure: CONTROL OF EPISTAXIS, POSTERIOR, USING NASAL PACKING OR CAUTERIZATION;  Surgeon: Jono Russell MD;  [facility];  Service: ENT;  Laterality: Bilateral;    CORONARY ANGIOGRAPHY N/A 10/04/2021    Procedure: Left heart cath +/- peripheral angiogram;  Surgeon: Jose Ruiz MD;  Location: [facility] CATH LAB;  Service: Cardiology;  Laterality: N/A;    CORONARY ANGIOGRAPHY N/A 3/2/2023    Procedure: Angiogram, Coronary;  Surgeon: Devon Garnica MD;  Location: [facility] CATH LAB;  Service: Cardiology;  Laterality: N/A;    CORONARY ANGIOPLASTY      CORONARY ARTERY BYPASS GRAFT      CORONARY BYPASS GRAFT ANGIOGRAPHY  10/04/2021    Procedure: Bypass graft study;  Surgeon: Jose Ruiz MD;  Location: [facility] CATH LAB;  Service: Cardiology;;    CORONARY BYPASS GRAFT ANGIOGRAPHY  3/2/2023    Procedure: Bypass graft study;  Surgeon: Devon Garnica MD;  Location: [facility] CATH LAB;  Service: Cardiology;;    ECHOCARDIOGRAM,TRANSESOPHAGEAL N/A 4/14/2023    Procedure: Transesophageal echo (KIRSTEN) intra-procedure log documentation;  Surgeon: [facility] Diagnostic Provider;  Location: [facility] EP LAB;  Service: Cardiology;  Laterality: N/A;    ENDOSCOPIC NASAL CAUTERIZATION Bilateral 11/3/2023    Procedure: CAUTERIZATION, NOSE, ENDOSCOPIC;  Surgeon: Jono Russell MD;  [facility];  Service: ENT;  Laterality: Bilateral;    ENDOSCOPIC NASAL CAUTERIZATION N/A 12/18/2023    Procedure: CAUTERIZATION, NOSE, ENDOSCOPIC;  Surgeon: Joon Russell MD;  [facility];  Service: ENT;  Laterality: N/A;    ENDOSCOPIC NASAL CAUTERIZATION N/A 2/26/2025    Procedure: CAUTERIZATION, NOSE, ENDOSCOPIC;  Surgeon: Jono Russell MD;  [facility];  Service: ENT;  Laterality: N/A;    ENDOSCOPIC NASAL CAUTERIZATION Bilateral 3/6/2025    Procedure: CAUTERIZATION, NOSE, ENDOSCOPIC;  Surgeon: Jono Russell MD;  [facility];  Service: ENT;   Laterality: Bilateral;  If he has an outpatient case request for same day, use the inpatient one instead.    EYE SURGERY      FESS, WITH IMAGING GUIDANCE Left 2/28/2024    Procedure: FESS, WITH IMAGING GUIDANCE;  Surgeon: Jono Russell MD;  Location: [facility] OR 2ND FLR;  Service: ENT;  Laterality: Left;  Scan loaded ENT [employer]. CL    FLUOROSCOPY Bilateral 10/29/2024    Procedure: Fluoroscopy;  Surgeon: Sameer Espitia MD;  Location: [facility] CATH LAB[facility]EP;  Service: Cardiology;  Laterality: Bilateral;  fluoroscopy of the mechanical aortic valve    FUNCTIONAL ENDOSCOPIC SINUS SURGERY (FESS) Bilateral 6/14/2023    Procedure: FESS (FUNCTIONAL ENDOSCOPIC SINUS SURGERY);  Surgeon: Jono Russell MD;  Location: [facility] OR 2ND FLR;  Service: ENT;  Laterality: Bilateral;    HERNIA REPAIR      INTRAOCULAR PROSTHESES INSERTION Left 11/13/2022    Procedure: INSERTION, IOL PROSTHESIS;  Surgeon: Alia Mckeon MD;  Location: [facility] OR 1ST FLR;  Service: Ophthalmology;  Laterality: Left;    INTRAOCULAR PROSTHESES INSERTION Right 12/04/2022    Procedure: INSERTION, IOL PROSTHESIS;  Surgeon: Alia Mckeon MD;  Location: [facility] OR 1ST FLR;  Service: Ophthalmology;  Laterality: Right;    LIGATION, ARTERY, ETHMOIDAL Left 2/28/2024    Procedure: LIGATION, ARTERY, ETHMOIDAL;  Surgeon: Jono Russell MD;  Location: [facility] OR 2ND FLR;  Service: ENT;  Laterality: Left;    LIGATION, ARTERY, SPHENOPALATINE, ENDOSCOPIC Bilateral 6/14/2023    Procedure: LIGATION, ARTERY, SPHENOPALATINE, ENDOSCOPIC;  Surgeon: Jono Russell MD;  Location: [facility] OR 2ND FLR;  Service: ENT;  Laterality: Bilateral;    NASAL ENDOSCOPY N/A 8/8/2024    Procedure: ENDOSCOPY, NOSE;  Surgeon: Jono Russell MD;  Location: [facility] OR 2ND FLR;  Service: ENT;  Laterality: N/A;    PHACOEMULSIFICATION OF CATARACT Left 11/13/2022    Procedure: PHACOEMULSIFICATION, CATARACT;  Surgeon: Alia Mckeon MD;  Location: [facility] OR 1ST FLR;  Service: Ophthalmology;  Laterality:  Left;    PHACOEMULSIFICATION OF CATARACT Right 12/04/2022    Procedure: PHACOEMULSIFICATION, CATARACT;  Surgeon: Alia Mckeon MD;  Location: [facility] OR [address] [place] [address];  Service: Ophthalmology;  Laterality: Right;    SPINE SURGERY      TRANSESOPHAGEAL ECHOCARDIOGRAPHY N/A 3/22/2023    Procedure: ECHOCARDIOGRAM, TRANSESOPHAGEAL;  Surgeon: [facility] Diagnostic Provider;  Location: [facility] EP LAB;  Service: Anesthesiology;  Laterality: N/A;    TRANSESOPHAGEAL ECHOCARDIOGRAPHY N/A 4/11/2023    Procedure: ECHOCARDIOGRAM, TRANSESOPHAGEAL;  Surgeon: [facility] Diagnostic Provider;  Location: [facility] EP LAB;  Service: Anesthesiology;  Laterality: N/A;    VASECTOMY         Family History   Problem Relation Name Age of Onset    Heart failure Mother      Heart disease Mother      Heart failure Father      Heart disease Father      Alcohol abuse Father      Heart failure Brother      Heart disease Brother      Diabetes Brother      No Known Problems Sister      No Known Problems Maternal Grandmother      No Known Problems Maternal Grandfather      No Known Problems Paternal Grandmother      No Known Problems Paternal Grandfather      Heart disease Sister      No Known Problems Maternal Aunt      No Known Problems Maternal Uncle      No Known Problems Paternal Aunt      No Known Problems Paternal Uncle      Amblyopia Neg Hx      Blindness Neg Hx      Cancer Neg Hx      Cataracts Neg Hx      Glaucoma Neg Hx      Hypertension Neg Hx      Macular degeneration Neg Hx      Retinal detachment Neg Hx      Strabismus Neg Hx      Stroke Neg Hx      Thyroid disease Neg Hx      Anemia Neg Hx      Arrhythmia Neg Hx      Asthma Neg Hx      Clotting disorder Neg Hx      Fainting Neg Hx      Heart attack Neg Hx      Hyperlipidemia Neg Hx      Atrial Septal Defect Neg Hx      Melanoma Neg Hx         Social History     Socioeconomic History    Marital status:      Spouse name: Ameena    Number of children: 3   Occupational History    Occupation: retired    Tobacco Use    Smoking status: Former     Current packs/day: 0.00     Average packs/day: 1 pack/day for 20.0 years (20.0 ttl pk-yrs)     Types: Cigarettes     Start date: 1960     Quit date: 1980     Years since quittin.5     Passive exposure: Never    Smokeless tobacco: Never   Substance and Sexual Activity    Alcohol use: No    Drug use: No    Sexual activity: Not Currently     Partners: Female     Social Drivers of Health     Financial Resource Strain: Low Risk  (2025)    Overall Financial Resource Strain (CARDIA)     Difficulty of Paying Living Expenses: Not hard at all   Food Insecurity: No Food Insecurity (2025)    Hunger Vital Sign     Worried About Running Out of Food in the Last Year: Never true     Ran Out of Food in the Last Year: Never true   Transportation Needs: No Transportation Needs (2024)    PRAPARE - Transportation     Lack of Transportation (Medical): No     Lack of Transportation (Non-Medical): No   Physical Activity: Inactive (2025)    Exercise Vital Sign     Days of Exercise per Week: 0 days     Minutes of Exercise per Session: 0 min   Stress: No Stress Concern Present (2025)    [employer] [place] [employer] - Occupational Stress Questionnaire     Feeling of Stress : Only a little   Housing Stability: Low Risk  (2025)    Housing Stability Vital Sign     Unable to Pay for Housing in the Last Year: No     Homeless in the Last Year: No       MEDICATIONS & ALLERGIES:     Current Outpatient Medications on File Prior to Visit   Medication Sig    acetaminophen (TYLENOL) 500 MG tablet Take 500 mg by mouth daily as needed for Pain.    albuterol (VENTOLIN HFA) 90 mcg/actuation inhaler Inhale 2 puffs into the lungs every 6 (six) hours as needed for Wheezing. Rescue    allopurinoL (ZYLOPRIM) 100 MG tablet Take 1 tablet (100 mg total) by mouth once daily.    bumetanide (BUMEX) 1 MG tablet Take 1 tablet (1 mg total) by mouth once daily. Patient  "takes 2 tablets as needed    ferrous gluconate (FERGON) 324 MG tablet TAKE 1 TABLET EVERY DAY WITH BREAKFAST    isosorbide mononitrate (IMDUR) 60 MG 24 hr tablet Take 1 tablet (60 mg total) by mouth every evening. (Patient taking differently: Take 60 mg by mouth once daily.)    omega-3 fatty acids/fish oil (FISH OIL-OMEGA-3 FATTY ACIDS) 300-1,000 mg capsule Take 1 capsule by mouth once daily.    oxymetazoline (AFRIN) 0.05 % nasal spray 2 sprays by Nasal route as needed (nose bleeds).    rosuvastatin (CRESTOR) 40 MG Tab TAKE 1 TABLET EVERY EVENING.    sodium chloride (SALINE NASAL) 0.65 % nasal spray Use 2 sprays by Nasal route every 4 hours. Apply into nasal cavities daily with nightly application of vaseline/aquaphor to anterior nares. Keep head of bed elevated.    traZODone (DESYREL) 50 MG tablet Take 1 tablet (50 mg total) by mouth every evening. As needed for insomnia    warfarin (COUMADIN) 5 MG tablet Take 2.5 mg (one-half tablet) daily until you follow up with coumadin clinic     No current facility-administered medications on file prior to visit.       Review of patient's allergies indicates:   Allergen Reactions    Lisinopril     Losartan      Intolerance- elevates potassium level       OBJECTIVE:     Vital Signs:  Vitals:    03/10/25 1405   BP: (!) 130/40   BP Location: Right arm   Patient Position: Sitting   Pulse: 72   SpO2: 96%   Weight: 66.8 kg (147 lb 4.3 oz)   Height: 5' 6" (1.676 m)       Body mass index is 23.77 kg/m².     Physical Exam:  General:  Well developed, well nourished, no acute distress  Head: Normocephalic, atraumatic  Eyes: No conjunctival abnormality  Neck: Normal ROM  CVS:  RRR, S1 and S2 normal, no murmurs, rubs, gallops, 2+ radial pulses  Resp:  Lungs clear to auscultation, no wheezes, rales, rhonchi, cough  GI:  Abdomen soft, non-tender, non-distended  MSK:  No muscle atrophy, cyanosis, peripheral edema   Skin:  No rashes  Neuro:  CNII-XII grossly intact, no focal deficits " "noted  Psych:  Appropriate mood and affect, normal judgement    Laboratory  Lab Results   Component Value Date    WBC 5.17 03/07/2025    HGB 8.0 (L) 03/07/2025    HCT 25.5 (L) 03/07/2025    MCV 93 03/07/2025     03/07/2025     Lab Results   Component Value Date    INR 2.7 (H) 03/07/2025    INR 2.6 (H) 03/06/2025    INR 2.4 (H) 03/05/2025     Lab Results   Component Value Date    HGBA1C 5.1 12/13/2024     No results for input(s): "POCTGLUCOSE" in the last 72 hours.          Diagnostic Results:  Labs: Reviewed    ASSESSMENT & PLAN:   Mr. Dariel Urbina is a 84 y.o. male who presents today for after hospital follow-up.    Dariel was seen today for hospital follow up.    Diagnoses and all orders for this visit:    Type 2 diabetes mellitus with stage 4 chronic kidney disease, without long-term current use of insulin  Last A1c, 5.1, given decreased appetite and weight loss, recommended diet liberalization for now. Avoid Vit K foods.    Acute blood loss anemia  Stable, no blood lose noted. No black stools. Stable BP.    Chronic kidney disease, stage 4 (severe)  Follow-up with nephro    Recurrent epistaxis  Stable, black mucosa in nares with no ulceration. No active bleeding.     Moderate malnutrition  Decreased appetite. Recommended whey, unfortunately cannot take other formulations given coumadin, waiting on coumadin clinic recommendations.   Given weight loss offerred geriatric follow-up, patient declined offer.  Also offered CT C/A/P for malignant work-up, patient would like to think about it and discuss with his PCP.                 RTC in 2 months with Dr. Langston    Case discussed with Dr Lyric Holland, DO  Internal Medicine PGY3  [facility]  [address] [place] [id number] [phone]\  "

## 2025-03-10 NOTE — PROGRESS NOTES
I have discussed the case with house staff and I have reviewed the appropriate portions of the patient's chart, and the house staff's history and physical, assessment and plan. I agree with the findings, assessment and plan.     Weight loss. Consider GI consult. Family discussion re: CT. RTC PCP.  ER if bleeding recurs. ENT f/u.    HOSPFU:  Admission Date: 2/24/2025  Hospital Length of Stay: 10 days  Discharge Date and Time:  03/07/2025 10:33 AM  Attending Physician: Nikkie Monsivais MD   Discharging Provider: Puma Arvizu MD  Primary Care Provider: Devon Langston Jr., MD  Hospital Medicine Team: [facility] Puma Arvizu MD  Primary Care Team: [facility]     HPI:   84 y.o. male with  paroxysmal afib, mechanical aortic valve replacement on Coumadin, CHF, CKD stage 3, T2DM, HTN, recurrent epistaxis s/p multiple cauterizations who is admitted to hospital medicine for acute blood loss anemia 2/2 to profound epistaxis.     Pt noted to have intermittent nosebleeds since prior cauterization which resolve with pressure and afrin. Usually has 1-3 epistaxis episodes a day that last for 1-2 hours. But this past week they have worsened in severity and have been more difficult to control with pressure and Afrin. Today morning 2 AM, he had a large episode of epistaxis that wouldn't resolve with Afrin and pressure till about 11:30 AM prior to arrival at ED. Denies any trauma to the area or any specific triggering events. He reports this past week he has had cough, sore throat, and congestion. He denies any fatigue, dizziness, chest pain, or abdominal pain.     ED Course: XR chest unremarkable, Hgb 6.7, Hct 22.2 - 1 unit pRBC given     Procedure(s) (LRB):  CAUTERIZATION, NOSE, ENDOSCOPIC (Bilateral)       Hospital Course:   84 y.o. male with  paroxysmal afib, mechanical aortic valve replacement on Coumadin, CHF, CKD stage 3, T2DM, HTN, recurrent epistaxis s/p multiple cauterizations who is admitted to hospital  medicine for acute blood loss anemia 2/2 to profound epistaxis. On admission, HgB- 6.7, received 1 unit of pRBCs. Repeat HgB s/p transfusion is 7.2. PT-INR goal per coumadin clinic is 2.2.5. Has wheezes on physical exam which is new. Covid/Flu/RSV panel unremarkable CXR concerning for possible infectious process. ENT planned for cauterization on 2/26, however pt wheezing worsened and it was decided to postpone surgery. Started on Ceftriaxone/Doxy for CAP.  Monitoring Pt-INR. Started on Warfarin 5 mg but PT-INR subtherapeutic now. Started on Heparin to bridge, but pt experienced another episode of epistaxis on 3/1. Heparin discontinued. Increased warfarin to 5 mg daily due to subtherapeutic INR and subsequently decreased to 2.5 mg. Underwent cauterization by ENT on 3/6, doing well. Transfused 1 post procedure, repeat HgB-8. Pt doing well over all s/p procedure. He is stable for discharge. Will discharge pt with Coumadin 2.5 mg till follow up with Warfarin clinic.

## 2025-03-10 NOTE — PATIENT INSTRUCTIONS
Can supplement diet with whey. Would recommend liberalizing diet but watching out for foods with Vitamin K (see attached handout).     Follow-up with Dr. Langston in 2 months.       If you have worsening bleeding that doesn't step please go to ED.

## 2025-03-10 NOTE — PROGRESS NOTES
Outpatient Care Management  Plan of Care Follow Up Visit    Patient: Dariel Urbina  MRN: [id number]  Date of Service: 03/10/2025  Completed by: Michaela Tang RN  Referral Date: 12/16/2024    Reason for Visit   Patient presents with    OPCM RN Follow Up Call    OPCM Post Discharge       Brief Summary: Patient was hospitalized at [facility] on 2/24-3/7 for profound epistaxis.  He went to the ED because he had an episode that lasted from 2 AM to 11:30 AM.  His Hgb was 6.7 on arrival to the ED so was transfused 1 unit of PRBC's.  He had wheezing with unremarkable covid, flu and RSV per MD note.  CXR was concerning for infectious process so was started on Ceftrizxone and Doxy for CAP.  He Had surgery on 3/6 but they did not find any bleeding once packing was removed so they just cleaned him up according to the spouse.  He received another unit of PRBC's post procedure.  He was discharged home on 3/7 and has not had a nose bleed since then.  His spouse, Ameena, states she received the educational materials but has not read them yet.  She states they spoke to the cardiologist in the hospital that told them he is not a candidate for a watchmen device due to an artificial valve.  She states his blood pressures are always fine and never run above 138/68.  She states he does not exercise because of the nose bleeds.  She also states he does not smoke or drink.  She states she does not remember any of the signs and symptoms of CKD.  Patient's spouse, Ameena, states that the patient is not eating very much at all because he has no appetite.  She states last night he ate one barbecue rib, some smothered potatoes and had argenis cake and milk.  She states that is all he ate yesterday.  She states that she got him some Ensure Shakes but was told by the [facility] that he cannot have the shakes.  She states the patient used to weigh 206 lbs and is now 145 lbs as of 3/6 during his hospital stay.  She states he has  lost this weight over the last two years.      -CM informed Ameena that if the patient is having signs and symptoms of CKD then she should call Dr. Langston to get renal lab work drawn to be certain that it is not progressing.  -CM encouraged Ameena to read the educational materials [facility] sent her.    -CM reiterated the signs and symptoms of CKD including anorexia; fatigue; weakness; difficulty sleeping; confusion; frequent urination, especially at night; muscle cramps at night; edema; periorbital edema, especially in the morning; nausea and vomiting; itchy skin (pruritus); weight gain or decreased urine output.  -[facility]A called the [facility], spoke to Elsa, to inquire if there are any protein shakes that the patient can drink with the coumadin.  Elsa is sending message to the pharmacist to call the patient's spouse and speak to her about this subject matter.  -NOLA sent message to Dr. Langston concerning patient's weight loss and not eating due to lack of appetite.     Next steps:   Follow up if read educational materials  Follow up on S&S of CKD  Follow up on low 2300 mg sodium diet  Follow up if patient eating more  Follow up if spoke to [facility] about protein supplements again  Follow up on patient's weight  Follow up on message to Dr. Langston about lack of appetite.

## 2025-03-11 ENCOUNTER — ANTI-COAG VISIT (OUTPATIENT)
Dept: CARDIOLOGY | Facility: CLINIC | Age: 85
End: 2025-03-11
Payer: MEDICARE

## 2025-03-11 DIAGNOSIS — Z79.01 CHRONIC ANTICOAGULATION: Primary | ICD-10-CM

## 2025-03-11 DIAGNOSIS — Z95.2 H/O MECHANICAL AORTIC VALVE REPLACEMENT: ICD-10-CM

## 2025-03-11 PROCEDURE — 93793 ANTICOAG MGMT PT WARFARIN: CPT | Mod: S$GLB,,,

## 2025-03-11 NOTE — PROGRESS NOTES
[facility]    2025 12:14 PM    Assessment/Plan:    Patient presents today with therapeutic INR.    Assessment of patient findings and chart review: per  pt is not tolerating diet/appetite decreased.  Pt inquired about supplement. Recommend [employer] protein supplement or another supplement w/o vit k would be ideal. If pt starts taking a protein supplement drink w/ vit k please notify CC of amount & frequency.     Recommendation for patient's warfarin regimen: Decrease per calendar.     Recommend repeat INR Thursday.   _________________________________________________________________    Dariel Urbina (84 y.o.) is followed by the [facility] Anticoagulation Management Program.    Anticoagulation Summary  As of 3/11/2025      INR goal:  2.0-2.5   TTR:  66.4% (12.4 y)   INR used for dosin.3 (3/10/2025)   Warfarin maintenance plan:  2.5 mg (5 mg x 0.5) every Mon, Wed, Fri; 5 mg (5 mg x 1) all other days   Weekly warfarin total:  27.5 mg   Plan last modified:  Laurie Patino, PharmD (2025)   Next INR check:  3/13/2025   Target end date:  --    Indications    Chronic anticoagulation [Z79.01]  H/O mechanical aortic valve replacement [Z95.2]                 Anticoagulation Episode Summary       INR check location:  Clinic Lab    Preferred lab:  --    Send INR reminders to:  [facility] COUMADIN MONITORING POOL    Comments:  [facility] - Internal Med Clinic only Mon - Thu // carpel tunnel release  - target low end of range          Anticoagulation Care Providers       Provider Role Specialty Phone number    Devon Langston Jr., MD [facility] Internal Medicine [phone]

## 2025-03-13 ENCOUNTER — ANTI-COAG VISIT (OUTPATIENT)
Dept: CARDIOLOGY | Facility: CLINIC | Age: 85
End: 2025-03-13
Payer: MEDICARE

## 2025-03-13 ENCOUNTER — LAB VISIT (OUTPATIENT)
Dept: LAB | Facility: HOSPITAL | Age: 85
End: 2025-03-13
Payer: MEDICARE

## 2025-03-13 ENCOUNTER — OUTPATIENT CASE MANAGEMENT (OUTPATIENT)
Dept: ADMINISTRATIVE | Facility: OTHER | Age: 85
End: 2025-03-13
Payer: MEDICARE

## 2025-03-13 DIAGNOSIS — Z95.2 H/O MECHANICAL AORTIC VALVE REPLACEMENT: ICD-10-CM

## 2025-03-13 DIAGNOSIS — Z79.01 CHRONIC ANTICOAGULATION: Primary | ICD-10-CM

## 2025-03-13 DIAGNOSIS — Z79.01 CHRONIC ANTICOAGULATION: ICD-10-CM

## 2025-03-13 LAB
INR PPP: 2.2 (ref 0.8–1.2)
PROTHROMBIN TIME: 22.4 SEC (ref 9–12.5)

## 2025-03-13 PROCEDURE — 93793 ANTICOAG MGMT PT WARFARIN: CPT | Mod: S$GLB,,,

## 2025-03-13 PROCEDURE — 36415 COLL VENOUS BLD VENIPUNCTURE: CPT | Mod: HCNC | Performed by: INTERNAL MEDICINE

## 2025-03-13 PROCEDURE — 85610 PROTHROMBIN TIME: CPT | Mod: HCNC | Performed by: INTERNAL MEDICINE

## 2025-03-13 NOTE — PROGRESS NOTES
Outpatient Care Management   - Care Plan Follow Up    Patient: Dariel Urbina  MRN:  [id number]  Date of Service:  3/13/2025  Completed by:  Chandrika Cline LMSW  Referral Date: 12/16/2024    Reason for Visit   Patient presents with    OPCM Post Discharge     3/13/2025       Brief Summary:  completed post discharge assessment with patient's wife, Ameena. Patient was sleeping at the time of the call. Patient was recently hospitalized for an active nosebleed and was encouraged to go to the ED when they had notified the ENT clinic. Ameena is providing transportation for patient. She also helps with the cooking and the cleaning in the home. No other concerns at this time. Case closed, notified OPCM RN.    Complex Care Plan     Care plan was discussed and completed today with input from patient and/or caregiver.    Patient Instructions     No follow-ups on file.

## 2025-03-13 NOTE — PROGRESS NOTES
[facility]    2025 2:27 PM    Assessment/Plan:    Patient presents today with therapeutic INR.    Assessment of patient findings and chart review: Proceed w/ decreased dose.  Will slightly increase to account for protein that pt will start.     Recommendation for patient's warfarin regimen: Per calendar.     Recommend repeat INR Tuesday.   _________________________________________________________________    Dariel Urbina (84 y.o.) is followed by the [facility] Anticoagulation Management Program.    Anticoagulation Summary  As of 3/13/2025      INR goal:  2.0-2.5   TTR:  66.4% (12.4 y)   INR used for dosin.2 (3/13/2025)   Warfarin maintenance plan:  5 mg (5 mg x 1) every Sun; 2.5 mg (5 mg x 0.5) all other days   Weekly warfarin total:  20 mg   Plan last modified:  Laurie Patino, PharmD (3/13/2025)   Next INR check:  3/18/2025   Target end date:  --    Indications    Chronic anticoagulation [Z79.01]  H/O mechanical aortic valve replacement [Z95.2]                 Anticoagulation Episode Summary       INR check location:  Clinic Lab    Preferred lab:  --    Send INR reminders to:  [facility] COUMADIN MONITORING POOL    Comments:  [facility] - Internal Med Clinic only Mon - Thu // carpel tunnel release  - target low end of range          Anticoagulation Care Providers       Provider Role Specialty Phone number    Devon Langston Jr., MD [facility] Internal Medicine [phone]

## 2025-03-18 ENCOUNTER — OFFICE VISIT (OUTPATIENT)
Dept: OTOLARYNGOLOGY | Facility: CLINIC | Age: 85
End: 2025-03-18
Payer: MEDICARE

## 2025-03-18 ENCOUNTER — ANTI-COAG VISIT (OUTPATIENT)
Dept: CARDIOLOGY | Facility: CLINIC | Age: 85
End: 2025-03-18
Payer: MEDICARE

## 2025-03-18 ENCOUNTER — LAB VISIT (OUTPATIENT)
Dept: LAB | Facility: HOSPITAL | Age: 85
End: 2025-03-18
Attending: INTERNAL MEDICINE
Payer: MEDICARE

## 2025-03-18 DIAGNOSIS — Z95.2 H/O MECHANICAL AORTIC VALVE REPLACEMENT: ICD-10-CM

## 2025-03-18 DIAGNOSIS — J34.89 NASAL SEPTAL PERFORATION: ICD-10-CM

## 2025-03-18 DIAGNOSIS — Z79.01 CHRONIC ANTICOAGULATION: ICD-10-CM

## 2025-03-18 DIAGNOSIS — N18.32 STAGE 3B CHRONIC KIDNEY DISEASE: ICD-10-CM

## 2025-03-18 DIAGNOSIS — Z79.01 CHRONIC ANTICOAGULATION: Primary | ICD-10-CM

## 2025-03-18 DIAGNOSIS — R04.0 EPISTAXIS: Primary | ICD-10-CM

## 2025-03-18 LAB
INR PPP: 3.2 (ref 0.8–1.2)
PROTHROMBIN TIME: 32 SEC (ref 9–12.5)

## 2025-03-18 PROCEDURE — 1111F DSCHRG MED/CURRENT MED MERGE: CPT | Mod: HCNC,CPTII,S$GLB, | Performed by: STUDENT IN AN ORGANIZED HEALTH CARE EDUCATION/TRAINING PROGRAM

## 2025-03-18 PROCEDURE — 36415 COLL VENOUS BLD VENIPUNCTURE: CPT | Mod: HCNC | Performed by: INTERNAL MEDICINE

## 2025-03-18 PROCEDURE — 99213 OFFICE O/P EST LOW 20 MIN: CPT | Mod: 25,HCNC,S$GLB, | Performed by: STUDENT IN AN ORGANIZED HEALTH CARE EDUCATION/TRAINING PROGRAM

## 2025-03-18 PROCEDURE — 1126F AMNT PAIN NOTED NONE PRSNT: CPT | Mod: HCNC,CPTII,S$GLB, | Performed by: STUDENT IN AN ORGANIZED HEALTH CARE EDUCATION/TRAINING PROGRAM

## 2025-03-18 PROCEDURE — 1159F MED LIST DOCD IN RCRD: CPT | Mod: HCNC,CPTII,S$GLB, | Performed by: STUDENT IN AN ORGANIZED HEALTH CARE EDUCATION/TRAINING PROGRAM

## 2025-03-18 PROCEDURE — 1160F RVW MEDS BY RX/DR IN RCRD: CPT | Mod: HCNC,CPTII,S$GLB, | Performed by: STUDENT IN AN ORGANIZED HEALTH CARE EDUCATION/TRAINING PROGRAM

## 2025-03-18 PROCEDURE — 31231 NASAL ENDOSCOPY DX: CPT | Mod: HCNC,S$GLB,, | Performed by: STUDENT IN AN ORGANIZED HEALTH CARE EDUCATION/TRAINING PROGRAM

## 2025-03-18 PROCEDURE — 99999 PR PBB SHADOW E&M-EST. PATIENT-LVL II: CPT | Mod: PBBFAC,HCNC,, | Performed by: STUDENT IN AN ORGANIZED HEALTH CARE EDUCATION/TRAINING PROGRAM

## 2025-03-18 PROCEDURE — 85610 PROTHROMBIN TIME: CPT | Mod: HCNC | Performed by: INTERNAL MEDICINE

## 2025-03-18 NOTE — FIRST PROVIDER EVALUATION
"Medical screening examination initiated.  I have conducted a focused provider triage encounter, findings are as follows:    Brief history of present illness:  82 y/o male who presents with left nare bleeding since 0300 this AM. On coumadin. Had vein "cut" 3 weeks ago.     There were no vitals filed for this visit.    Pertinent physical exam:  alert, nonlabored, has clip on nose, not weak    Brief workup plan:  labs    Preliminary workup initiated; this workup will be continued and followed by the physician or advanced practice provider that is assigned to the patient when roomed.  " Detail Level: Zone

## 2025-03-18 NOTE — PROGRESS NOTES
Subjective:      Dariel is a 84 y.o. male who comes for follow-up of  epistaxis .  He was recently admitted for bleeding and underwent nasal endoscopy and control of the epistaxis on 03/06/2025.  Since then no further bleeding episodes.  Feels much better postnasal which.    He previously undergone bilateral SP ligation followed by bilateral embolization, cautery on the left septum for persistent bleeding on 11/03/2023 and again recently on 12/18/23. He recently underwent left AEA ligation on 2/28/24 and recently underwent nasal endoscopy and control of epistaxis on 6/18/24.  He then had repeat embolization on 08/06/24. Most recently s/p cautery on 03/06/2025.    His current sinus regime consists of: Saline.     The assessment of quality and severity of symptoms as measured by the SNOT-22 score is deferred.    The patient's medications, allergies, past medical, surgical, social and family histories were reviewed and updated as appropriate.    ROS     A detailed review of systems was obtained with pertinent positives as per the above HPI, and otherwise negative.        Objective:     There were no vitals taken for this visit.       Constitutional:   Vital signs are normal. He appears well-developed and well-nourished.     Head:  Normocephalic and atraumatic.     Ears:  Hearing normal to normal and whispered voice; external ear normal without scars, lesions, or masses; ear canal, tympanic membrane, and middle ear normal..   Right Ear: No swelling. Tympanic membrane is not perforated and not bulging. No middle ear effusion.   Left Ear: No swelling. Tympanic membrane is not perforated and not bulging.  No middle ear effusion.     Nose:  Nose normal including turbinates, nasal mucosa, sinuses and nasal septum. No epistaxis.         Mouth/Throat  Oropharynx clear and moist without lesions or asymmetry and normal uvula midline. Normal dentition. No tonsillar abscesses. Tonsillar exudate.      Neck:  Neck normal without  thyromegaly masses, asymmetry, normal tracheal structure, crepitus, and tenderness, thyroid normal, trachea normal, phonation normal, full range of motion with neck supple and no adenopathy. No stridor present.        Head (right side): No submental adenopathy present.        Head (left side): No submental adenopathy present.     He has no cervical adenopathy.     Pulmonary/Chest:   No stridor.     Procedure    Nasal Endoscopy:  3/18/2025    The use of diagnostic nasal endoscopy was considered medically necessary for the evaluation and visualization of the nasal anatomy for symptoms suggestive of nasal or sinus origin. Physical examination (including a nasal speculum evaluation) did not provide sufficient clinical information to establish a diagnosis, or symptoms did not improve or worsened following treatment.     The nasal cavity was decongested with topical 1% phenylephrine and anesthetized with 4% lidocaine.  A rigid 0-degree endoscope was introduced into the nasal cavity.    The patient was seated in the examination chair. After discussion of risks and benefits, a nasal endoscope was inserted into the nose the endoscope was passed along the left nasal floor to the nasopharynx. It was then passed between the middle and superior meatus, nasal turbinates, nasal septum, nasopharynx and sphenoethmoid region. The nasal endoscope was withdrawn and there was no complications. An identical procedure was performed on the right side. I was present for the entire procedure.The patient tolerated the above procedure well. The findings of this procedure can be found in the dictated note from 3/18/2025 visit.      Completely nasal cavity.  Stable appearing septal perforation.  No blood clots noted in the nasal cavity no crusting appreciated.     Data Reviewed    WBC (K/uL)   Date Value   03/07/2025 5.17     Eosinophil % (%)   Date Value   03/07/2025 3.3     Eos # (K/uL)   Date Value   03/07/2025 0.2     Platelets (K/uL)   Date  "Value   03/07/2025 162     Glucose (mg/dL)   Date Value   03/07/2025 67 (L)     No results found for: "IGE"    No sinus imaging available.    Assessment:     1. Epistaxis    2. Nasal septal perforation    3. Stage 3b chronic kidney disease          Plan:     - Aggressive nasal saline to help with packing.  - RTC 1 months    Jono Russell MD     "

## 2025-03-18 NOTE — PROGRESS NOTES
[facility]    03/18/2025 4:21 PM    Assessment/Plan:    Patient presents today with supratherapeutic INR.    Assessment of patient findings and chart review: Pt has not started Whey which was to be integrated into diet due to decrease in appetite.     Recommendation for patient's warfarin regimen: Hold dose today then decrease maintenance dose    Recommend repeat INR in 1 week  _________________________________________________________________    Dariel Urbina (84 y.o.) is followed by the [facility] Anticoagulation Management Program.    Anticoagulation Summary  As of 3/18/2025      INR goal:  2.0-2.5   TTR:  66.3% (12.5 y)   INR used for dosing:  3.2 (3/18/2025)   Warfarin maintenance plan:  2.5 mg (5 mg x 0.5) every day   Weekly warfarin total:  17.5 mg   Plan last modified:  Laurie Patino, PharmD (3/18/2025)   Next INR check:  3/25/2025   Target end date:  --    Indications    Chronic anticoagulation [Z79.01]  H/O mechanical aortic valve replacement [Z95.2]                 Anticoagulation Episode Summary       INR check location:  Clinic Lab    Preferred lab:  --    Send INR reminders to:  [facility] COUMADIN MONITORING POOL    Comments:  [facility] IM - Internal Med Clinic only Mon - Thu // carpel tunnel release 5/19 - target low end of range          Anticoagulation Care Providers       Provider Role Specialty Phone number    Devon Langston Jr., MD [facility] Internal Medicine [phone]

## 2025-03-25 ENCOUNTER — TELEPHONE (OUTPATIENT)
Dept: INTERNAL MEDICINE | Facility: CLINIC | Age: 85
End: 2025-03-25
Payer: MEDICARE

## 2025-03-25 ENCOUNTER — ANTI-COAG VISIT (OUTPATIENT)
Dept: CARDIOLOGY | Facility: CLINIC | Age: 85
End: 2025-03-25
Payer: MEDICARE

## 2025-03-25 ENCOUNTER — LAB VISIT (OUTPATIENT)
Dept: LAB | Facility: HOSPITAL | Age: 85
End: 2025-03-25
Attending: INTERNAL MEDICINE
Payer: MEDICARE

## 2025-03-25 DIAGNOSIS — Z79.01 CHRONIC ANTICOAGULATION: Primary | ICD-10-CM

## 2025-03-25 DIAGNOSIS — Z79.01 CHRONIC ANTICOAGULATION: ICD-10-CM

## 2025-03-25 DIAGNOSIS — Z95.2 H/O MECHANICAL AORTIC VALVE REPLACEMENT: ICD-10-CM

## 2025-03-25 LAB
INR PPP: 1.4 (ref 0.8–1.2)
PROTHROMBIN TIME: 15 SECONDS (ref 9–12.5)

## 2025-03-25 PROCEDURE — 85610 PROTHROMBIN TIME: CPT | Mod: HCNC

## 2025-03-25 PROCEDURE — 93793 ANTICOAG MGMT PT WARFARIN: CPT | Mod: S$GLB,,,

## 2025-03-25 PROCEDURE — 36415 COLL VENOUS BLD VENIPUNCTURE: CPT | Mod: HCNC

## 2025-03-25 NOTE — PROGRESS NOTES
[facility]    2025 1:46 PM    Assessment/Plan:    Patient presents today with subtherapeutic  INR.    Assessment of patient findings and chart review: See findings.  Pt to avoid NSAIDs due to DDI w/ warfarin/increased risk of bleeding.     Recommendation for patient's warfarin regimen: Boost dose today to 5mg then resume current maintenance dose    Recommend repeat INR in 1 week  _________________________________________________________________    Dariel rUbina (84 y.o.) is followed by the [facility] Anticoagulation Management Program.    Anticoagulation Summary  As of 3/25/2025      INR goal:  2.0-2.5   TTR:  66.3% (12.5 y)   INR used for dosin.4 (3/25/2025)   Warfarin maintenance plan:  2.5 mg (5 mg x 0.5) every day   Weekly warfarin total:  17.5 mg   Plan last modified:  Laurie Patino, PharmD (3/18/2025)   Next INR check:  4/3/2025   Target end date:  --    Indications    Chronic anticoagulation [Z79.01]  H/O mechanical aortic valve replacement [Z95.2]                 Anticoagulation Episode Summary       INR check location:  Clinic Lab    Preferred lab:  --    Send INR reminders to:  [facility] COUMADIN MONITORING POOL    Comments:  [facility] IM - Internal Med Clinic only Mon - Thu // carpel tunnel release  - target low end of range          Anticoagulation Care Providers       Provider Role Specialty Phone number    Devon Langston Jr., MD [facility] Internal Medicine [phone]

## 2025-03-26 NOTE — PROGRESS NOTES
3/26/25  Wife called and reported Patient is scheduled to have 2 teeth extracted 3/27 by Dr Segundo Jean ph # [phone]

## 2025-03-31 ENCOUNTER — OUTPATIENT CASE MANAGEMENT (OUTPATIENT)
Dept: ADMINISTRATIVE | Facility: OTHER | Age: 85
End: 2025-03-31
Payer: MEDICARE

## 2025-03-31 NOTE — PLAN OF CARE
----- Message from [employer] sent at 3/31/2025  1:21 PM CDT -----  Contact: [phone]  Type: Returning a callWho left a message? officeWhen did the practice call? todayDoes patient know what this is regarding: nWould the patient rather a call back or a response via [facility]? callComments: pt lives alone, please let phone ring or call more than once   Provider's  Ramo stated there are no available appointments within a week. She will message provider and call patient with scheduled follow up appointment.

## 2025-03-31 NOTE — PROGRESS NOTES
Outpatient Care Management  Plan of Care Follow Up Visit    Patient: Dariel Urbina  MRN: [id number]  Date of Service: 03/31/2025  Completed by: Michaela Tang RN  Referral Date: 12/16/2024    Reason for Visit   Patient presents with    OPCM RN Follow Up Call       Brief Summary:   MEMBER'S CURRENT STATUS  :  -S&S of CKD:  Patient's spouse, Ameena, states that she started reading the educational materials.  She states that the signs and symptoms of CKD are numbness of the feet, slurred speech and diarrhea.  She states he had some feet swelling yesterday but today his ankles are skinny.      -Nose bleeds:  Ameena states that the patient has only had a couple of drops of blood since his last surgery on 3/6.  She states he is not bleeding everyday like he was and he even went out and mowed the lawn yesterday on their riding .     -2300 mg sodium diet:  Patient's spouse, Ameena, states that the patient is not eating much at all.  She states he had smothered potatoes and dirty rice the day before yesterday, and he had a biscuit with butter and jam then had cornbread with milk yesterday.  She states he also had a strawberry malt last night.  She states it's so little that there is not enough sodium to count.      -Protein supplements:  Ameena states that the coumadin clinic told her that they do not recommend protein supplements for him because of his fluctuations in his INR levels.      -Appetite stimulant:  She states that she has not hear anything from Dr. Langston about appetite stimulant.  NOLA also has not gotten any return messages from Dr. Langston from the message that was sent to Dr. Langston on 3/10 about an appetite stimulant for the patient.      -Fluids intake:  Ameena states that the patient does not drink soda but does drink a lot of water and iced tea.  She states his last weight was 145 lbs at home.         INTERVENTIONS  :  -S&S of CKD:  NOLA reiterated the signs and symptoms of CKD including anorexia;  fatigue; weakness; difficulty sleeping; confusion; frequent urination, especially at night; muscle cramps at night; edema; periorbital edema, especially in the morning; nausea and vomiting; itchy skin (pruritus); weight gain or decreased urine output.   CM explained if he has these symptoms to call the patient's doctor so they can adjust medications as needed.      -Nose bleeds:  CM encouraged Ameena to continue to monitor for nose bleeds and to let the patient ENT doctor know if he starts having them again.    -2300 mg sodium diet:  CM encouraged Ameena to continue to try to get the patient to eat as much as possible.      -Protein supplements:  NOLA shared her disappointment that the patient cannot have protein supplements due to his coumadin.    -Appetite stimulant:  CM encouraged Ameena to write down questions for the nephrology NP Priscilla Loza that the patient is going to see.  CM encouraged Ameena to talk to her about an appetite stimulant for the patient.      -Fluids intake:  CM commended Ameena for the patient's water intake.  CM also encouraged Ameena to ask Priscilla NP in nephrology if the patient has a fluid restriction or if he should be drinking as much water as possible.      Next steps:   Follow up on S&S of CKD  Follow up on low 2300 mg sodium diet  Follow up if patient eating more  Follow up on patient's weight  Follow up on appetite stimulant from nephrology  Follow up on fluid restriction with nephrology

## 2025-04-01 DIAGNOSIS — N18.9 CHRONIC KIDNEY DISEASE, UNSPECIFIED CKD STAGE: Primary | ICD-10-CM

## 2025-04-03 ENCOUNTER — LAB VISIT (OUTPATIENT)
Dept: LAB | Facility: HOSPITAL | Age: 85
End: 2025-04-03
Attending: INTERNAL MEDICINE
Payer: MEDICARE

## 2025-04-03 ENCOUNTER — OFFICE VISIT (OUTPATIENT)
Dept: NEPHROLOGY | Facility: CLINIC | Age: 85
End: 2025-04-03
Payer: MEDICARE

## 2025-04-03 VITALS
OXYGEN SATURATION: 95 % | SYSTOLIC BLOOD PRESSURE: 169 MMHG | WEIGHT: 143.31 LBS | DIASTOLIC BLOOD PRESSURE: 57 MMHG | HEART RATE: 65 BPM | BODY MASS INDEX: 23.13 KG/M2

## 2025-04-03 DIAGNOSIS — N25.81 SECONDARY HYPERPARATHYROIDISM OF RENAL ORIGIN: ICD-10-CM

## 2025-04-03 DIAGNOSIS — R80.9 PROTEINURIA, UNSPECIFIED TYPE: ICD-10-CM

## 2025-04-03 DIAGNOSIS — N18.32 STAGE 3B CHRONIC KIDNEY DISEASE: Primary | ICD-10-CM

## 2025-04-03 DIAGNOSIS — E87.20 ACIDOSIS: ICD-10-CM

## 2025-04-03 DIAGNOSIS — D64.9 ANEMIA, UNSPECIFIED TYPE: ICD-10-CM

## 2025-04-03 DIAGNOSIS — N18.4 TYPE 2 DIABETES MELLITUS WITH STAGE 4 CHRONIC KIDNEY DISEASE, WITHOUT LONG-TERM CURRENT USE OF INSULIN: Chronic | ICD-10-CM

## 2025-04-03 DIAGNOSIS — E11.22 TYPE 2 DIABETES MELLITUS WITH STAGE 4 CHRONIC KIDNEY DISEASE, WITHOUT LONG-TERM CURRENT USE OF INSULIN: Chronic | ICD-10-CM

## 2025-04-03 DIAGNOSIS — I10 PRIMARY HYPERTENSION: ICD-10-CM

## 2025-04-03 DIAGNOSIS — N18.9 CHRONIC KIDNEY DISEASE, UNSPECIFIED CKD STAGE: ICD-10-CM

## 2025-04-03 LAB
ABSOLUTE EOSINOPHIL (OHS): 0.16 K/UL
ABSOLUTE MONOCYTE (OHS): 0.7 K/UL (ref 0.3–1)
ABSOLUTE NEUTROPHIL COUNT (OHS): 3.76 K/UL (ref 1.8–7.7)
ALBUMIN SERPL BCP-MCNC: 3.4 G/DL (ref 3.5–5.2)
ANION GAP (OHS): 7 MMOL/L (ref 8–16)
BASOPHILS # BLD AUTO: 0.03 K/UL
BASOPHILS NFR BLD AUTO: 0.5 %
BUN SERPL-MCNC: 30 MG/DL (ref 8–23)
CALCIUM SERPL-MCNC: 10.1 MG/DL (ref 8.7–10.5)
CHLORIDE SERPL-SCNC: 108 MMOL/L (ref 95–110)
CO2 SERPL-SCNC: 26 MMOL/L (ref 23–29)
CREAT SERPL-MCNC: 2.2 MG/DL (ref 0.5–1.4)
ERYTHROCYTE [DISTWIDTH] IN BLOOD BY AUTOMATED COUNT: 17.2 % (ref 11.5–14.5)
GFR SERPLBLD CREATININE-BSD FMLA CKD-EPI: 29 ML/MIN/1.73/M2
GLUCOSE SERPL-MCNC: 105 MG/DL (ref 70–110)
HCT VFR BLD AUTO: 33 % (ref 40–54)
HGB BLD-MCNC: 9.9 GM/DL (ref 14–18)
IMM GRANULOCYTES # BLD AUTO: 0.02 K/UL (ref 0–0.04)
IMM GRANULOCYTES NFR BLD AUTO: 0.4 % (ref 0–0.5)
LYMPHOCYTES # BLD AUTO: 0.86 K/UL (ref 1–4.8)
MCH RBC QN AUTO: 28.9 PG (ref 27–31)
MCHC RBC AUTO-ENTMCNC: 30 G/DL (ref 32–36)
MCV RBC AUTO: 97 FL (ref 82–98)
NUCLEATED RBC (/100WBC) (OHS): 0 /100 WBC
PHOSPHATE SERPL-MCNC: 3.9 MG/DL (ref 2.7–4.5)
PLATELET # BLD AUTO: 161 K/UL (ref 150–450)
PMV BLD AUTO: 11.1 FL (ref 9.2–12.9)
POTASSIUM SERPL-SCNC: 5.3 MMOL/L (ref 3.5–5.1)
RBC # BLD AUTO: 3.42 M/UL (ref 4.6–6.2)
RELATIVE EOSINOPHIL (OHS): 2.9 %
RELATIVE LYMPHOCYTE (OHS): 15.6 % (ref 18–48)
RELATIVE MONOCYTE (OHS): 12.7 % (ref 4–15)
RELATIVE NEUTROPHIL (OHS): 67.9 % (ref 38–73)
SODIUM SERPL-SCNC: 141 MMOL/L (ref 136–145)
WBC # BLD AUTO: 5.53 K/UL (ref 3.9–12.7)

## 2025-04-03 PROCEDURE — 36415 COLL VENOUS BLD VENIPUNCTURE: CPT | Mod: HCNC

## 2025-04-03 PROCEDURE — 99999 PR PBB SHADOW E&M-EST. PATIENT-LVL III: CPT | Mod: PBBFAC,HCNC,, | Performed by: NURSE PRACTITIONER

## 2025-04-03 PROCEDURE — 80069 RENAL FUNCTION PANEL: CPT | Mod: HCNC

## 2025-04-03 PROCEDURE — 85025 COMPLETE CBC W/AUTO DIFF WBC: CPT | Mod: HCNC

## 2025-04-03 RX ORDER — SODIUM BICARBONATE 650 MG/1
650 TABLET ORAL DAILY
Qty: 30 TABLET | Refills: 11 | Status: SHIPPED | OUTPATIENT
Start: 2025-04-03 | End: 2026-04-03

## 2025-04-03 NOTE — PROGRESS NOTES
Subjective:       Patient ID: Dariel Urbina is a 84 y.o. white male who presents for follow-up evaluation of CKD.    HPI     Patient is new to me. Last seen by Dr. Shaffer in 3/11/22 for CKD IIIb.  Prior pertinent chart reviewed since this is patient's first appointment with me.  Patient presents with his wife for follow-up of CKD.  Baseline creatinine of CKD IIIb-IV, h/o JIM, HLD, h/o gout, h/o mechanical aortic valve replacement on warfarin, T2DM, CAD s/p CABG, HTN, chronic combined systolic and diastolic heart failure, pAF, mitral insufficiency. Noted several admissions for intermittent CHF exacerbation, supratherapeutic INR, anemia related to epistaxis. Recently presented for outpt KIRSTEN on 4/11/23 for mitralclip eval, however INR supratherapeutic (4.9) on presentation. KIRSTEN performed on 4/14/23 showed moderate mitral regurgitation. Recent JIM on last admission with peak sCr of 3.5. US performed showed bladder mass with concerns for obstructive uropathy and recommendations for Urology eval. Renal function improved with no need for Urology intervention as an inpatient. Patient continued to follow-up as an outpatient with repeat CT without contrast showing no bladder mass or abnormalities. No urologic intervention necessary. Most recent sCr 2.3 in April. Will repeat labs. He denies volume issues on bumex 1mg qod. He denies urinary symptoms. BP stable today. The patient denies taking NSAIDs, herbal supplements, or new antibiotics, recreational drugs, recent episode of dehydration, diarrhea, nausea or vomiting, acute illness. He received contrast with angiogram on 3/2/23.       Significant family hx includes: Mother, father, brother- heart failure/ heart disease    Update 4/3/25:  - Presents with wife for follow-up of CKD. Cr 1.9.  - Has had several admissions recently epistaxis, anemia. Reports doing well over the last month.   - BP at home 130-140/60-70       Review of Systems   Respiratory:  Negative for  Unable to obtain Med Rec at this time.  Pt unable to participate in an interview at this time.    No pharmacy or family on file to call.  Nothing to query from [facility] or source from Outside Records Tab.    Follow up may be provided at a later time.       shortness of breath.    Cardiovascular:  Positive for leg swelling.   Gastrointestinal:  Negative for diarrhea, nausea and vomiting.   Genitourinary:  Negative for difficulty urinating, dysuria and hematuria.   All other systems reviewed and are negative.      Objective:       Blood pressure (!) 169/57, pulse 65, weight 65 kg (143 lb 4.8 oz), SpO2 95%.  Physical Exam  Vitals reviewed.   Constitutional:       General: He is not in acute distress.  HENT:      Head: Normocephalic and atraumatic.   Eyes:      General: No scleral icterus.  Cardiovascular:      Rate and Rhythm: Normal rate.   Pulmonary:      Effort: Pulmonary effort is normal. No respiratory distress.   Musculoskeletal:      Comments: Trace BLE edema   Skin:     General: Skin is warm and dry.   Neurological:      Mental Status: He is alert.   Psychiatric:         Mood and Affect: Mood normal.         Behavior: Behavior normal.           Lab Results   Component Value Date    CREATININE 1.9 (H) 03/07/2025    URICACID 5.8 07/08/2024     Prot/Creat Ratio, Urine   Date Value Ref Range Status   07/08/2024 0.73 (H) 0.00 - 0.20 Final   05/22/2023 1.48 (H) 0.00 - 0.20 Final   04/13/2023 0.99 (H) 0.00 - 0.20 Final     Lab Results   Component Value Date     03/07/2025    K 5.0 03/07/2025    CO2 18 (L) 03/07/2025     (H) 03/07/2025     Lab Results   Component Value Date    .6 (H) 07/08/2024    CALCIUM 9.3 03/07/2025    CAION 1.29 03/20/2018    PHOS 2.8 03/07/2025     Lab Results   Component Value Date    HGB 8.0 (L) 03/07/2025    WBC 5.17 03/07/2025    HCT 25.5 (L) 03/07/2025      Lab Results   Component Value Date    HGBA1C 5.1 12/13/2024     03/07/2025    BUN 38 (H) 03/07/2025     Lab Results   Component Value Date    LDLCALC 36.2 (L) 05/29/2024         Assessment:       1. Stage 3b chronic kidney disease    2. Proteinuria, unspecified type    3. Acidosis    4. Secondary hyperparathyroidism of renal origin    5. Anemia, unspecified type     6. Type 2 diabetes mellitus with stage 4 chronic kidney disease, without long-term current use of insulin    7. Primary hypertension          Plan:   CKD stage 3b-4  -  - Baseline Cr ~1.7-2.0, last at baseline around Dec 2022, now trending > 2  - CKD 2/2 HTN, history of AKIs  - Continue glycemic control. T2DM diet controlled. Continue BP control. Monitor volume status. On bumex    UPCR Sub nephrotic; No RASi with h/o hyperkalemia   Acid-base Bicarb low. Start low dose NaBicarb. Monitor.    Secondary hyperparathyroidism PTH elevated. Ca and phos okay.   Repeat PTH and Vit D   Anemia Below CKD goal, frequent epistaxis on warfarin, following with ENT s/p cauterization    Lipid Management On statin   ESRD planning Deferred today     HTN   - Overall has been stable on current regiment    Hyperuricemia  - h/o gout flare, on allopurinol    All questions patient had were answered.  Asked if further questions. None. F/u in clinic 3 months  with labs and urine prior to next visit or sooner if needed.  ER for emergency concerns.    Summary of Plan:  - Rx NaBicarb  - RTC 3 months with labs for monitoring

## 2025-04-07 ENCOUNTER — LAB VISIT (OUTPATIENT)
Dept: LAB | Facility: HOSPITAL | Age: 85
End: 2025-04-07
Attending: INTERNAL MEDICINE
Payer: MEDICARE

## 2025-04-07 ENCOUNTER — ANTI-COAG VISIT (OUTPATIENT)
Dept: CARDIOLOGY | Facility: CLINIC | Age: 85
End: 2025-04-07
Payer: MEDICARE

## 2025-04-07 DIAGNOSIS — Z95.2 H/O MECHANICAL AORTIC VALVE REPLACEMENT: ICD-10-CM

## 2025-04-07 DIAGNOSIS — Z79.01 CHRONIC ANTICOAGULATION: Primary | ICD-10-CM

## 2025-04-07 DIAGNOSIS — Z79.01 CHRONIC ANTICOAGULATION: ICD-10-CM

## 2025-04-07 LAB
INR PPP: 1.7 (ref 0.8–1.2)
PROTHROMBIN TIME: 17.6 SECONDS (ref 9–12.5)

## 2025-04-07 PROCEDURE — 93793 ANTICOAG MGMT PT WARFARIN: CPT | Mod: S$GLB,,,

## 2025-04-07 PROCEDURE — 36415 COLL VENOUS BLD VENIPUNCTURE: CPT | Mod: HCNC

## 2025-04-07 PROCEDURE — 85610 PROTHROMBIN TIME: CPT | Mod: HCNC

## 2025-04-07 NOTE — PROGRESS NOTES
[facility]    2025 2:06 PM    Assessment/Plan:    Patient presents today with subtherapeutic  INR.    Assessment of patient findings and chart review: Reviewed    Recommendation for patient's warfarin regimen: Boost dose today to 5mg then resume current maintenance dose    Recommend repeat INR in 1 week  _________________________________________________________________    Dariel Urbina (84 y.o.) is followed by the [facility].    Anticoagulation Summary  As of 2025      INR goal:  2.0-2.5   TTR:  66.1% (12.5 y)   INR used for dosin.7 (2025)   Warfarin maintenance plan:  2.5 mg (5 mg x 0.5) every day   Weekly warfarin total:  17.5 mg   Plan last modified:  Laurie Patino, PharmD (3/18/2025)   Next INR check:  2025   Target end date:  --    Indications    Chronic anticoagulation [Z79.01]  H/O mechanical aortic valve replacement [Z95.2]                 Anticoagulation Episode Summary       INR check location:  Clinic Lab    Preferred lab:  --    Send INR reminders to:  [facility] COUMADIN MONITORING POOL    Comments:  [facility] IM - Internal Med Clinic only Mon - Thu // carpel tunnel release  - target low end of range          Anticoagulation Care Providers       Provider Role Specialty Phone number    Devon Langston Jr., MD [facility] Internal Medicine [phone]

## 2025-04-14 ENCOUNTER — ANTI-COAG VISIT (OUTPATIENT)
Dept: CARDIOLOGY | Facility: CLINIC | Age: 85
End: 2025-04-14
Payer: MEDICARE

## 2025-04-14 ENCOUNTER — LAB VISIT (OUTPATIENT)
Dept: LAB | Facility: HOSPITAL | Age: 85
End: 2025-04-14
Attending: INTERNAL MEDICINE
Payer: MEDICARE

## 2025-04-14 DIAGNOSIS — Z79.01 CHRONIC ANTICOAGULATION: Primary | ICD-10-CM

## 2025-04-14 DIAGNOSIS — Z95.2 H/O MECHANICAL AORTIC VALVE REPLACEMENT: ICD-10-CM

## 2025-04-14 DIAGNOSIS — Z79.01 CHRONIC ANTICOAGULATION: ICD-10-CM

## 2025-04-14 LAB
INR PPP: 2.1 (ref 0.8–1.2)
PROTHROMBIN TIME: 21.9 SECONDS (ref 9–12.5)

## 2025-04-14 PROCEDURE — 85610 PROTHROMBIN TIME: CPT | Mod: HCNC

## 2025-04-14 PROCEDURE — 93793 ANTICOAG MGMT PT WARFARIN: CPT | Mod: S$GLB,,,

## 2025-04-14 PROCEDURE — 36415 COLL VENOUS BLD VENIPUNCTURE: CPT | Mod: HCNC

## 2025-04-14 NOTE — PROGRESS NOTES
[facility]    2025 2:21 PM    Assessment/Plan:    Patient presents today with therapeutic INR.    Assessment of patient findings and chart review: Reviewed     Recommendation for patient's warfarin regimen: Continue current maintenance dose    Recommend repeat INR in 2 weeks  _________________________________________________________________    Dariel Devon Urbina (84 y.o.) is followed by the [facility].    Anticoagulation Summary  As of 2025      INR goal:  2.0-2.5   TTR:  66.0% (12.5 y)   INR used for dosin.1 (2025)   Warfarin maintenance plan:  2.5 mg (5 mg x 0.5) every day   Weekly warfarin total:  17.5 mg   Plan last modified:  Laurie Patino, PharmD (3/18/2025)   Next INR check:  2025   Target end date:  --    Indications    Chronic anticoagulation [Z79.01]  H/O mechanical aortic valve replacement [Z95.2]                 Anticoagulation Episode Summary       INR check location:  Clinic Lab    Preferred lab:  --    Send INR reminders to:  [facility] COUMADIN MONITORING POOL    Comments:  [facility] - Internal Med Clinic only Mon - Thu // carpel tunnel release  - target low end of range          Anticoagulation Care Providers       Provider Role Specialty Phone number    Devon Langston Jr., MD [facility] Internal Medicine [phone]

## 2025-04-21 ENCOUNTER — ANTI-COAG VISIT (OUTPATIENT)
Dept: CARDIOLOGY | Facility: CLINIC | Age: 85
End: 2025-04-21
Payer: MEDICARE

## 2025-04-21 ENCOUNTER — LAB VISIT (OUTPATIENT)
Dept: LAB | Facility: HOSPITAL | Age: 85
End: 2025-04-21
Payer: MEDICARE

## 2025-04-21 DIAGNOSIS — Z95.2 H/O MECHANICAL AORTIC VALVE REPLACEMENT: ICD-10-CM

## 2025-04-21 DIAGNOSIS — Z79.01 CHRONIC ANTICOAGULATION: ICD-10-CM

## 2025-04-21 DIAGNOSIS — Z79.01 CHRONIC ANTICOAGULATION: Primary | ICD-10-CM

## 2025-04-21 LAB
INR PPP: 1.6 (ref 0.8–1.2)
PROTHROMBIN TIME: 17.4 SECONDS (ref 9–12.5)

## 2025-04-21 PROCEDURE — 85610 PROTHROMBIN TIME: CPT | Mod: HCNC

## 2025-04-21 PROCEDURE — 93793 ANTICOAG MGMT PT WARFARIN: CPT | Mod: S$GLB,,,

## 2025-04-21 PROCEDURE — 36415 COLL VENOUS BLD VENIPUNCTURE: CPT | Mod: HCNC

## 2025-04-21 NOTE — PROGRESS NOTES
[facility]    2025 11:21 AM    Assessment/Plan:    Patient presents today with subtherapeutic  INR.    Assessment of patient findings and chart review: Reviewed.  Pt has been taking a higher dose than recommended.     Recommendation for patient's warfarin regimen: Increase per calendar.     Recommend repeat INR 1 week.   _________________________________________________________________    Dariel Osorio Ciara (84 y.o.) is followed by the [facility].    Anticoagulation Summary  As of 2025      INR goal:  2.0-2.5   TTR:  66.0% (12.5 y)   INR used for dosin.6 (2025)   Warfarin maintenance plan:  2.5 mg (5 mg x 0.5) every day   Weekly warfarin total:  17.5 mg   Plan last modified:  Laurie Patino, PharmD (3/18/2025)   Next INR check:  2025   Target end date:  --    Indications    Chronic anticoagulation [Z79.01]  H/O mechanical aortic valve replacement [Z95.2]                 Anticoagulation Episode Summary       INR check location:  Clinic Lab    Preferred lab:  --    Send INR reminders to:  [facility] COUMADIN MONITORING POOL    Comments:  [facility] IM - Internal Med Clinic only Mon - Th // carpel tunnel release  - target low end of range          Anticoagulation Care Providers       Provider Role Specialty Phone number    Devon Langston Jr., MD [facility] Internal Medicine [phone]

## 2025-04-22 ENCOUNTER — OUTPATIENT CASE MANAGEMENT (OUTPATIENT)
Dept: ADMINISTRATIVE | Facility: OTHER | Age: 85
End: 2025-04-22
Payer: MEDICARE

## 2025-04-22 NOTE — LETTER
Dariel Urbina  [address]  [place] [address]      Dear Dariel Urbina,     I am your nurse with [facility]. I was unsuccessful in reaching you today. At your earliest convenience, I would like to discuss your healthcare progress.      Please contact me at [phone] from 8:00AM to 4:30 PM on Monday thru Friday.     As you know, [facility] is a program offered to you through [facility] where a nurse is available 24/7 to answer questions or provide medical advice, their number is [phone].    Thanks,    Kimberlee Tang RN  Outpatient Care Management

## 2025-04-28 ENCOUNTER — LAB VISIT (OUTPATIENT)
Dept: LAB | Facility: HOSPITAL | Age: 85
End: 2025-04-28
Attending: INTERNAL MEDICINE
Payer: MEDICARE

## 2025-04-28 ENCOUNTER — ANTI-COAG VISIT (OUTPATIENT)
Dept: CARDIOLOGY | Facility: CLINIC | Age: 85
End: 2025-04-28
Payer: MEDICARE

## 2025-04-28 DIAGNOSIS — Z95.2 H/O MECHANICAL AORTIC VALVE REPLACEMENT: ICD-10-CM

## 2025-04-28 DIAGNOSIS — Z79.01 CHRONIC ANTICOAGULATION: Primary | ICD-10-CM

## 2025-04-28 DIAGNOSIS — Z79.01 CHRONIC ANTICOAGULATION: ICD-10-CM

## 2025-04-28 LAB
INR PPP: 1.4 (ref 0.8–1.2)
PROTHROMBIN TIME: 14.5 SECONDS (ref 9–12.5)

## 2025-04-28 PROCEDURE — 85610 PROTHROMBIN TIME: CPT | Mod: HCNC

## 2025-04-28 PROCEDURE — 93793 ANTICOAG MGMT PT WARFARIN: CPT | Mod: S$GLB,,,

## 2025-04-28 PROCEDURE — 36415 COLL VENOUS BLD VENIPUNCTURE: CPT | Mod: HCNC

## 2025-04-28 NOTE — PROGRESS NOTES
[facility]    2025 4:23 PM    Assessment/Plan:    Patient presents today with subtherapeutic  INR.    Assessment of patient findings and chart review: Reviewed.  Pt to notify PCP of change in health.  Recommend to all have CBC w/o diff assessed.     Recommendation for patient's warfarin regimen: Per calendar.     Recommend repeat INR Thursday.  _________________________________________________________________    Dariel Urbina (84 y.o.) is followed by the [facility].    Anticoagulation Summary  As of 2025      INR goal:  2.0-2.5   TTR:  65.9% (12.6 y)   INR used for dosin.4 (2025)   Warfarin maintenance plan:  2.5 mg (5 mg x 0.5) every day   Weekly warfarin total:  17.5 mg   Plan last modified:  Laurie Patino, PharmD (3/18/2025)   Next INR check:  2025   Target end date:  --    Indications    Chronic anticoagulation [Z79.01]  H/O mechanical aortic valve replacement [Z95.2]                 Anticoagulation Episode Summary       INR check location:  Clinic Lab    Preferred lab:  --    Send INR reminders to:  [facility] COUMADIN MONITORING POOL    Comments:  [facility] - Internal Med Clinic only Mon - Thu // carpel tunnel release  - target low end of range          Anticoagulation Care Providers       Provider Role Specialty Phone number    Devon Langston Jr., MD [facility] Internal Medicine [phone]

## 2025-05-01 ENCOUNTER — ANTI-COAG VISIT (OUTPATIENT)
Dept: CARDIOLOGY | Facility: CLINIC | Age: 85
End: 2025-05-01
Payer: MEDICARE

## 2025-05-01 ENCOUNTER — LAB VISIT (OUTPATIENT)
Dept: LAB | Facility: HOSPITAL | Age: 85
End: 2025-05-01
Attending: INTERNAL MEDICINE
Payer: MEDICARE

## 2025-05-01 DIAGNOSIS — Z95.2 H/O MECHANICAL AORTIC VALVE REPLACEMENT: ICD-10-CM

## 2025-05-01 DIAGNOSIS — Z79.01 CHRONIC ANTICOAGULATION: Primary | ICD-10-CM

## 2025-05-01 DIAGNOSIS — Z79.01 CHRONIC ANTICOAGULATION: ICD-10-CM

## 2025-05-01 LAB
INR PPP: 1.8 (ref 0.8–1.2)
PROTHROMBIN TIME: 18.9 SECONDS (ref 9–12.5)

## 2025-05-01 PROCEDURE — 36415 COLL VENOUS BLD VENIPUNCTURE: CPT | Mod: HCNC

## 2025-05-01 PROCEDURE — 85610 PROTHROMBIN TIME: CPT | Mod: HCNC

## 2025-05-01 PROCEDURE — 93793 ANTICOAG MGMT PT WARFARIN: CPT | Mod: S$GLB,,,

## 2025-05-01 NOTE — PROGRESS NOTES
[email or web address]    2025 1:29 PM    Assessment/Plan:    Patient presents today with subtherapeutic  INR.    Assessment of patient findings and chart review: reviewed; pt to have CBC soon    Recommendation for patient's warfarin regimen: Continue current maintenance dose; since pt was recently boosted , will hold off on additional boosting     Recommend repeat INR in 4 days  _________________________________________________________________    Dariel Urbina (84 y.o.) is followed by the [facility].    Anticoagulation Summary  As of 2025      INR goal:  2.0-2.5   TTR:  65.8% (12.6 y)   INR used for dosin.8 (2025)   Warfarin maintenance plan:  2.5 mg (5 mg x 0.5) every day   Weekly warfarin total:  17.5 mg   Plan last modified:  Laurie Patino, PharmD (3/18/2025)   Next INR check:  2025   Target end date:  --    Indications    Chronic anticoagulation [Z79.01]  H/O mechanical aortic valve replacement [Z95.2]                 Anticoagulation Episode Summary       INR check location:  Clinic Lab    Preferred lab:  --    Send INR reminders to:  [facility] COUMADIN MONITORING POOL    Comments:  [facility] - Internal Med Clinic only Mon - Thu // carpel tunnel release  - target low end of range          Anticoagulation Care Providers       Provider Role Specialty Phone number    Devon Langston Jr., MD [facility] Internal Medicine [phone]

## 2025-05-06 ENCOUNTER — OUTPATIENT CASE MANAGEMENT (OUTPATIENT)
Dept: ADMINISTRATIVE | Facility: OTHER | Age: 85
End: 2025-05-06
Payer: MEDICARE

## 2025-05-06 ENCOUNTER — ANTI-COAG VISIT (OUTPATIENT)
Dept: CARDIOLOGY | Facility: CLINIC | Age: 85
End: 2025-05-06
Payer: MEDICARE

## 2025-05-06 ENCOUNTER — LAB VISIT (OUTPATIENT)
Dept: LAB | Facility: HOSPITAL | Age: 85
End: 2025-05-06
Attending: INTERNAL MEDICINE
Payer: MEDICARE

## 2025-05-06 DIAGNOSIS — Z95.2 H/O MECHANICAL AORTIC VALVE REPLACEMENT: ICD-10-CM

## 2025-05-06 DIAGNOSIS — Z79.01 CHRONIC ANTICOAGULATION: ICD-10-CM

## 2025-05-06 DIAGNOSIS — Z79.01 CHRONIC ANTICOAGULATION: Primary | ICD-10-CM

## 2025-05-06 LAB
INR PPP: 1.3 (ref 0.8–1.2)
PROTHROMBIN TIME: 13.9 SECONDS (ref 9–12.5)

## 2025-05-06 PROCEDURE — 93793 ANTICOAG MGMT PT WARFARIN: CPT | Mod: S$GLB,,,

## 2025-05-06 PROCEDURE — 85610 PROTHROMBIN TIME: CPT | Mod: HCNC

## 2025-05-06 PROCEDURE — 36415 COLL VENOUS BLD VENIPUNCTURE: CPT | Mod: HCNC

## 2025-05-06 NOTE — PROGRESS NOTES
[facility]    2025 12:36 PM    Assessment/Plan:    Patient presents today with subtherapeutic  INR.    Assessment of patient findings and chart review: INR below goal. Pt missed 5/2 dose, took all other doses as instructed. Reports a nose bleed this morning that stopped.     Recommendation for patient's warfarin regimen: Boost dose today to 7.5 mg then increase maintenance dose    Recommend repeat INR in 1 week  _________________________________________________________________    Dariel Urbina (84 y.o.) is followed by the [facility] Management [facility].    Anticoagulation Summary  As of 2025      INR goal:  2.0-2.5   TTR:  65.7% (12.6 y)   INR used for dosin.3 (2025)   Warfarin maintenance plan:  5 mg (5 mg x 1) every Sun, Thu; 2.5 mg (5 mg x 0.5) all other days   Weekly warfarin total:  22.5 mg   Plan last modified:  Wilton Rangel, PharmD (2025)   Next INR check:  2025   Target end date:  --    Indications    Chronic anticoagulation [Z79.01]  H/O mechanical aortic valve replacement [Z95.2]                 Anticoagulation Episode Summary       INR check location:  Clinic Lab    Preferred lab:  --    Send INR reminders to:  [facility] COUMADIN MONITORING POOL    Comments:  [facility] IM - Internal Med Clinic only Mon - Thu // carpel tunnel release  - target low end of range          Anticoagulation Care Providers       Provider Role Specialty Phone number    Devon Langston Jr., MD [facility] Internal Medicine [phone]                            
No

## 2025-05-06 NOTE — PROGRESS NOTES
Outpatient Care Management  Plan of Care Follow Up Visit    Patient: Dariel Urbina  MRN: [id number]  Date of Service: 05/06/2025  Completed by: Michaela Tang RN  Referral Date: 12/16/2024    Reason for Visit   Patient presents with    OP[facility] SW Follow Up Call       Brief Summary:   MEMBER'S CURRENT STATUS  :  -Shortness of breath:  Patient's spouse, mAeena, states that the patient is having trouble breathing when he lays down flat.  She states he has no swelling, coughing or weight gain.  She states he can sleep sitting up so he sleeps in his lounge chair for now.  She states his O2 sat is 93% with a HR of 76 right now.  She states he has an appointment with Dr. Langston on Thursday, today is Tuesday, and will talk to him about the breathing then.    -2300 mg sodium diet:  Patient's spouse, Ameena, states that the patient is not eating much.  She states he got his new dentures and they are bothering him.  She states he is still having appetite problems in addition to the dentures not fitting right.  She states he does not like to use the paste to hold them in but uses the tapes instead.  She states she told him that he has to go back to the dentist and get them grinded so they fit better.     -Appetite stimulant:  Ameena states that they have not talked to anyone about an appetite stimulant yet.  She states he has an appointment to see Dr. Langston on Thursday, today being Tuesday.      -Fluids intake:  Ameena states that the patient drinks mostly water with a glass of milk for breakfast.  She states they do not measure the water he drinks, he keeps a cup in the fridge and comes in to sip on it all day long.  She states he weighs 146 and has had no weight gain at this time.        INTERVENTIONS  :  -Shortness of breath:  CM reviewed the signs and symptoms of CHF with Ameena and instructed her to keep an eye on his O2 sats.  CM instructed her to take the patient to the ED if his sats drop below 90%.  CM encouraged Ameena  to keep the appointment for the patient with Dr. Langston on Thursday.    -2300 mg sodium diet:  CM encouraged Ameena to continue to remind the patient about making a dentist appointment for his dentures to be adjusted.      -Appetite stimulant:  CM encouraged Ameena to speak to Dr. Langston on Thursday about an appetite stimulant to help him.      -Fluids intake:  CM encouraged Ameena to measure how much fluid the patient is consuming.  CM also encouraged Ameena to ask Dr. Langston how much fluid he is allowed to have daily due to his CHF and CKD.         Next steps:   Follow up on S&S of CKD  Follow up on low 2300 mg sodium diet  Follow up if patient eating more  Follow up on patient's weight  Follow up on appetite stimulant from Dr. Langston  Follow up on fluid restriction from Dr. Langston  Follow up if still SOB when laying down

## 2025-05-08 ENCOUNTER — LAB VISIT (OUTPATIENT)
Dept: LAB | Facility: HOSPITAL | Age: 85
End: 2025-05-08
Attending: INTERNAL MEDICINE
Payer: MEDICARE

## 2025-05-08 ENCOUNTER — ANTI-COAG VISIT (OUTPATIENT)
Dept: CARDIOLOGY | Facility: CLINIC | Age: 85
End: 2025-05-08
Payer: MEDICARE

## 2025-05-08 ENCOUNTER — OFFICE VISIT (OUTPATIENT)
Dept: INTERNAL MEDICINE | Facility: CLINIC | Age: 85
End: 2025-05-08
Payer: MEDICARE

## 2025-05-08 VITALS
HEART RATE: 65 BPM | BODY MASS INDEX: 24.65 KG/M2 | WEIGHT: 147.94 LBS | HEIGHT: 65 IN | DIASTOLIC BLOOD PRESSURE: 52 MMHG | SYSTOLIC BLOOD PRESSURE: 128 MMHG | OXYGEN SATURATION: 97 %

## 2025-05-08 DIAGNOSIS — Z79.01 CHRONIC ANTICOAGULATION: Primary | ICD-10-CM

## 2025-05-08 DIAGNOSIS — G47.00 INSOMNIA, UNSPECIFIED TYPE: ICD-10-CM

## 2025-05-08 DIAGNOSIS — R06.09 DOE (DYSPNEA ON EXERTION): Primary | ICD-10-CM

## 2025-05-08 DIAGNOSIS — N18.32 STAGE 3B CHRONIC KIDNEY DISEASE: Chronic | ICD-10-CM

## 2025-05-08 DIAGNOSIS — Z95.2 H/O MECHANICAL AORTIC VALVE REPLACEMENT: ICD-10-CM

## 2025-05-08 DIAGNOSIS — D64.9 ANEMIA, UNSPECIFIED TYPE: ICD-10-CM

## 2025-05-08 DIAGNOSIS — Z79.01 CHRONIC ANTICOAGULATION: ICD-10-CM

## 2025-05-08 LAB
ABSOLUTE EOSINOPHIL (OHS): 0.13 K/UL
ABSOLUTE MONOCYTE (OHS): 0.51 K/UL (ref 0.3–1)
ABSOLUTE NEUTROPHIL COUNT (OHS): 2.79 K/UL (ref 1.8–7.7)
ALBUMIN SERPL BCP-MCNC: 3.6 G/DL (ref 3.5–5.2)
ALP SERPL-CCNC: 101 UNIT/L (ref 40–150)
ALT SERPL W/O P-5'-P-CCNC: 8 UNIT/L (ref 10–44)
ANION GAP (OHS): 8 MMOL/L (ref 8–16)
AST SERPL-CCNC: 17 UNIT/L (ref 11–45)
BASOPHILS # BLD AUTO: 0.05 K/UL
BASOPHILS NFR BLD AUTO: 1.1 %
BILIRUB SERPL-MCNC: 0.6 MG/DL (ref 0.1–1)
BUN SERPL-MCNC: 33 MG/DL (ref 8–23)
CALCIUM SERPL-MCNC: 10.7 MG/DL (ref 8.7–10.5)
CHLORIDE SERPL-SCNC: 106 MMOL/L (ref 95–110)
CO2 SERPL-SCNC: 26 MMOL/L (ref 23–29)
CREAT SERPL-MCNC: 2.2 MG/DL (ref 0.5–1.4)
ERYTHROCYTE [DISTWIDTH] IN BLOOD BY AUTOMATED COUNT: 17 % (ref 11.5–14.5)
GFR SERPLBLD CREATININE-BSD FMLA CKD-EPI: 29 ML/MIN/1.73/M2
GLUCOSE SERPL-MCNC: 99 MG/DL (ref 70–110)
HCT VFR BLD AUTO: 33.7 % (ref 40–54)
HGB BLD-MCNC: 10.4 GM/DL (ref 14–18)
IMM GRANULOCYTES # BLD AUTO: 0.04 K/UL (ref 0–0.04)
IMM GRANULOCYTES NFR BLD AUTO: 0.9 % (ref 0–0.5)
INR PPP: 1.5 (ref 0.8–1.2)
LYMPHOCYTES # BLD AUTO: 0.9 K/UL (ref 1–4.8)
MCH RBC QN AUTO: 30.1 PG (ref 27–31)
MCHC RBC AUTO-ENTMCNC: 30.9 G/DL (ref 32–36)
MCV RBC AUTO: 97 FL (ref 82–98)
NUCLEATED RBC (/100WBC) (OHS): 0 /100 WBC
PLATELET # BLD AUTO: 158 K/UL (ref 150–450)
PMV BLD AUTO: 11.9 FL (ref 9.2–12.9)
POTASSIUM SERPL-SCNC: 4.5 MMOL/L (ref 3.5–5.1)
PROT SERPL-MCNC: 7.9 GM/DL (ref 6–8.4)
PROTHROMBIN TIME: 16.4 SECONDS (ref 9–12.5)
RBC # BLD AUTO: 3.46 M/UL (ref 4.6–6.2)
RELATIVE EOSINOPHIL (OHS): 2.9 %
RELATIVE LYMPHOCYTE (OHS): 20.4 % (ref 18–48)
RELATIVE MONOCYTE (OHS): 11.5 % (ref 4–15)
RELATIVE NEUTROPHIL (OHS): 63.2 % (ref 38–73)
SODIUM SERPL-SCNC: 140 MMOL/L (ref 136–145)
WBC # BLD AUTO: 4.42 K/UL (ref 3.9–12.7)

## 2025-05-08 PROCEDURE — 99999 PR PBB SHADOW E&M-EST. PATIENT-LVL III: CPT | Mod: PBBFAC,HCNC,, | Performed by: INTERNAL MEDICINE

## 2025-05-08 PROCEDURE — 93793 ANTICOAG MGMT PT WARFARIN: CPT | Mod: S$GLB,,,

## 2025-05-08 PROCEDURE — 80053 COMPREHEN METABOLIC PANEL: CPT | Mod: HCNC

## 2025-05-08 PROCEDURE — 85025 COMPLETE CBC W/AUTO DIFF WBC: CPT | Mod: HCNC

## 2025-05-08 PROCEDURE — 36415 COLL VENOUS BLD VENIPUNCTURE: CPT | Mod: HCNC

## 2025-05-08 PROCEDURE — 85610 PROTHROMBIN TIME: CPT | Mod: HCNC

## 2025-05-08 RX ORDER — QUETIAPINE FUMARATE 50 MG/1
50 TABLET, FILM COATED ORAL NIGHTLY
Qty: 30 TABLET | Refills: 11 | Status: SHIPPED | OUTPATIENT
Start: 2025-05-08 | End: 2026-05-08

## 2025-05-08 RX ORDER — QUETIAPINE FUMARATE 50 MG/1
50 TABLET, FILM COATED ORAL NIGHTLY
Qty: 30 TABLET | Refills: 11 | Status: SHIPPED | OUTPATIENT
Start: 2025-05-08 | End: 2025-05-08

## 2025-05-08 NOTE — PROGRESS NOTES
[email or web address]    2025 4:25 PM    Assessment/Plan:    Patient presents today with subtherapeutic  INR.    Assessment of patient findings and chart review: patient reported correct dosing    Recommendation for patient's warfarin regimen: Boost dose today to 7.5mg then resume current maintenance dose    Recommend repeat INR in 4 days  _________________________________________________________________    Dariel Osorio Tonyajean (84 y.o.) is followed by the [facility].    Anticoagulation Summary  As of 2025      INR goal:  2.0-2.5   TTR:  65.7% (12.6 y)   INR used for dosin.5 (2025)   Warfarin maintenance plan:  5 mg (5 mg x 1) every Sun, Thu; 2.5 mg (5 mg x 0.5) all other days   Weekly warfarin total:  22.5 mg   Plan last modified:  Wilton Rangel, PharmD (2025)   Next INR check:  2025   Target end date:  --    Indications    Chronic anticoagulation [Z79.01]  H/O mechanical aortic valve replacement [Z95.2]                 Anticoagulation Episode Summary       INR check location:  Clinic Lab    Preferred lab:  --    Send INR reminders to:  [facility] COUMADIN MONITORING POOL    Comments:  [facility] IM - Internal Med Clinic only Mon - Thu // carpel tunnel release  - target low end of range          Anticoagulation Care Providers       Provider Role Specialty Phone number    Devon Langston Jr., MD [facility] Internal Medicine [phone]

## 2025-05-12 ENCOUNTER — OFFICE VISIT (OUTPATIENT)
Dept: DERMATOLOGY | Facility: CLINIC | Age: 85
End: 2025-05-12
Payer: MEDICARE

## 2025-05-12 ENCOUNTER — LAB VISIT (OUTPATIENT)
Dept: LAB | Facility: HOSPITAL | Age: 85
End: 2025-05-12
Attending: INTERNAL MEDICINE
Payer: MEDICARE

## 2025-05-12 ENCOUNTER — ANTI-COAG VISIT (OUTPATIENT)
Dept: CARDIOLOGY | Facility: CLINIC | Age: 85
End: 2025-05-12
Payer: MEDICARE

## 2025-05-12 DIAGNOSIS — L21.9 SEBORRHEIC DERMATITIS: Primary | ICD-10-CM

## 2025-05-12 DIAGNOSIS — Z95.2 H/O MECHANICAL AORTIC VALVE REPLACEMENT: ICD-10-CM

## 2025-05-12 DIAGNOSIS — H61.001 CNH (CHONDRODERMATITIS NODULARIS HELICIS), RIGHT: ICD-10-CM

## 2025-05-12 DIAGNOSIS — Z79.01 CHRONIC ANTICOAGULATION: ICD-10-CM

## 2025-05-12 DIAGNOSIS — Z79.01 CHRONIC ANTICOAGULATION: Primary | ICD-10-CM

## 2025-05-12 DIAGNOSIS — L57.0 ACTINIC KERATOSIS: ICD-10-CM

## 2025-05-12 LAB
INR PPP: 1.8 (ref 0.8–1.2)
PROTHROMBIN TIME: 19.1 SECONDS (ref 9–12.5)

## 2025-05-12 PROCEDURE — 1159F MED LIST DOCD IN RCRD: CPT | Mod: CPTII,HCNC,S$GLB, | Performed by: DERMATOLOGY

## 2025-05-12 PROCEDURE — 99214 OFFICE O/P EST MOD 30 MIN: CPT | Mod: 25,HCNC,S$GLB, | Performed by: DERMATOLOGY

## 2025-05-12 PROCEDURE — 17000 DESTRUCT PREMALG LESION: CPT | Mod: HCNC,S$GLB,, | Performed by: DERMATOLOGY

## 2025-05-12 PROCEDURE — 85610 PROTHROMBIN TIME: CPT | Mod: HCNC

## 2025-05-12 PROCEDURE — 99999 PR PBB SHADOW E&M-EST. PATIENT-LVL II: CPT | Mod: PBBFAC,HCNC,, | Performed by: DERMATOLOGY

## 2025-05-12 PROCEDURE — 36415 COLL VENOUS BLD VENIPUNCTURE: CPT | Mod: HCNC

## 2025-05-12 PROCEDURE — 17003 DESTRUCT PREMALG LES 2-14: CPT | Mod: 59,HCNC,S$GLB, | Performed by: DERMATOLOGY

## 2025-05-12 RX ORDER — KETOCONAZOLE 20 MG/G
CREAM TOPICAL 2 TIMES DAILY
Qty: 60 G | Refills: 1 | Status: SHIPPED | OUTPATIENT
Start: 2025-05-12

## 2025-05-12 NOTE — PROGRESS NOTES
Subjective:      Patient ID:  Dariel Urbina is a 84 y.o. male who presents for   Chief Complaint   Patient presents with    Lesion     HPI  Patient with new area of concern:   Location: right ear  Duration-more than 1 year  S/s-painful   Previous treatments: none    Hx of NMSC  This is a high risk patient here to check for the development of new lesions.    Scalp well healed s/p recent Mohs.    Review of Systems    Objective:   Physical Exam   Constitutional: He appears well-developed and well-nourished. No distress.   Neurological: He is alert and oriented to person, place, and time. He is not disoriented.   Psychiatric: He has a normal mood and affect.   Skin:   Areas Examined (abnormalities noted in diagram):   Scalp / Hair Palpated and Inspected  Head / Face Inspection Performed            Diagram Legend     Erythematous scaling macule/papule c/w actinic keratosis       Vascular papule c/w angioma      Pigmented verrucoid papule/plaque c/w seborrheic keratosis      Yellow umbilicated papule c/w sebaceous hyperplasia      Irregularly shaped tan macule c/w lentigo     1-2 mm smooth white papules consistent with Milia      Movable subcutaneous cyst with punctum c/w epidermal inclusion cyst      Subcutaneous movable cyst c/w pilar cyst      Firm pink to brown papule c/w dermatofibroma      Pedunculated fleshy papule(s) c/w skin tag(s)      Evenly pigmented macule c/w junctional nevus     Mildly variegated pigmented, slightly irregular-bordered macule c/w mildly atypical nevus      Flesh colored to evenly pigmented papule c/w intradermal nevus       Pink pearly papule/plaque c/w basal cell carcinoma      Erythematous hyperkeratotic cursted plaque c/w SCC      Surgical scar with no sign of skin cancer recurrence      Open and closed comedones      Inflammatory papules and pustules      Verrucoid papule consistent consistent with wart     Erythematous eczematous patches and plaques     Dystrophic onycholytic  nail with subungual debris c/w onychomycosis     Umbilicated papule    Erythematous-base heme-crusted tan verrucoid plaque consistent with inflamed seborrheic keratosis     Erythematous Silvery Scaling Plaque c/w Psoriasis     See annotation      Assessment / Plan:        Seborrheic dermatitis  -     ketoconazole (NIZORAL) 2 % cream; Apply topically 2 (two) times daily. For dry skin around nose and ears  Dispense: 60 g; Refill: 1    CNH (chondrodermatitis nodularis helicis), right    Actinic keratosis    Cryosurgery Procedure Note    Verbal consent from the patient is obtained including, but not limited to, risk of hypopigmentation/hyperpigmentation, scar, recurrence of lesion. The patient is aware of the precancerous quality and need for treatment of these lesions. Liquid nitrogen cryosurgery is applied to the 6 actinic keratoses, as detailed in the physical exam, to produce a freeze injury. The patient is aware that blisters may form and is instructed on wound care with gentle cleansing and use of vaseline ointment to keep moist until healed. The patient is supplied a handout on cryosurgery and is instructed to call if lesions do not completely resolve.       Right mid post ear helix AK vs CNH pt sleeps on this side  If not resolving c/s biopsy next visit      No follow-ups on file.  6 mo

## 2025-05-12 NOTE — PROGRESS NOTES
[facility]    2025 10:21 AM    Assessment/Plan:    Patient presents today with subtherapeutic  INR.    Assessment of patient findings and chart review: Reviewed    Recommendation for patient's warfarin regimen: Recommend pt takes warfarin 5mg tonight & increase maintenance dose.    Recommend repeat INR in 1 week  _________________________________________________________________    Dariel Osorio Ciara (84 y.o.) is followed by the [facility].    Anticoagulation Summary  As of 2025      INR goal:  2.0-2.5   TTR:  65.7% (12.6 y)   INR used for dosin.8 (2025)   Warfarin maintenance plan:  5 mg (5 mg x 1) every Sun, Tue, u; 2.5 mg (5 mg x 0.5) all other days   Weekly warfarin total:  25 mg   Plan last modified:  Laurie Patino, PharmD (2025)   Next INR check:  2025   Target end date:  --    Indications    Chronic anticoagulation [Z79.01]  H/O mechanical aortic valve replacement [Z95.2]                 Anticoagulation Episode Summary       INR check location:  Clinic Lab    Preferred lab:  --    Send INR reminders to:  [facility] COUMADIN MONITORING POOL    Comments:  [facility] IM - Internal Med Clinic only Mon - Thu // carpel tunnel release  - target low end of range          Anticoagulation Care Providers       Provider Role Specialty Phone number    Devon Langston Jr., MD [facility] Internal Medicine [phone]

## 2025-05-14 RX ORDER — BUMETANIDE 1 MG/1
TABLET ORAL
Qty: 90 TABLET | Refills: 3 | Status: SHIPPED | OUTPATIENT
Start: 2025-05-14

## 2025-05-14 NOTE — TELEPHONE ENCOUNTER
Refill Routing Note   Medication(s) are not appropriate for processing by [facility] for the following reason(s):        Clarification of medication (Rx) details: Current directions unclear. Sig states: Take 1 tablet (1 mg total) by mouth once daily. Patient takes 2 tablets as needed     [facility] action(s):  Defer     Requires labs : Yes    Medication Therapy Plan: Lab(s) recommended: Lipid Panel, Uric Acid.      Appointments  past 12m or future 3m with PCP    Date Provider   Last Visit   5/8/2025 Devon Langston Jr., MD   Next Visit   8/12/2025 Devon Langston Jr., MD   ED visits in past 90 days: 0        Note composed:12:15 PM 05/14/2025

## 2025-05-14 NOTE — TELEPHONE ENCOUNTER
Care Due:                  Date            Visit Type   Department     Provider  --------------------------------------------------------------------------------                                EP -                              PRIMARY      [facility] INTERNAL  Last Visit: 05-      CARE ([facility])   JOSHUA Langston                              [facility]                              PRIMARY      [facility] INTERNAL  Next Visit: 08-      CARE ([facility])   [facility]       Devon Langston                                                            Last  Test          Frequency    Reason                     Performed    Due Date  --------------------------------------------------------------------------------    Lipid Panel.  12 months..  rosuvastatin.............  05- 05-    Uric Acid...  12 months..  allopurinoL..............  07- 07-    [facility] Embedded Care Due Messages. Reference number: [id number].   5/14/2025 3:07:13 AM CDT

## 2025-05-15 ENCOUNTER — TELEPHONE (OUTPATIENT)
Dept: INTERNAL MEDICINE | Facility: CLINIC | Age: 85
End: 2025-05-15
Payer: MEDICARE

## 2025-05-15 NOTE — TELEPHONE ENCOUNTER
----- Message from Melissa sent at 5/15/2025  1:58 PM CDT -----  Contact: [phone]  [email or web address] Patient is calling for Medical Advice regarding:referral for gastro How long has patient had these symptoms:Pharmacy name and phone#:Patient wants a call back or [facility]:call back Comments:Pts wife states they are needing a referral for a gastro dr for the pt please advise Please advise patient replies from provider may take up to 48 hours.

## 2025-05-15 NOTE — PROGRESS NOTES
This note was generated with [employer] voice recognition software. I apologize for any possible typographical errors.  Ashvin seems to have trouble with pronouns so I apologize if I Mis pronoun anyone       He comes in today to follow-up his ongoing medical problems.  I last saw him in January 2025.  He has had difficulty with nasal bleeding for the last several years and has had multiple hospitalizations secondary to his epistaxis.  He has a mechanical heart valve he is on chronic Coumadin for this which has made this worse.  Also his he suffers from chronic kidney disease stage IIIB.    His biggest complaint today is dyspnea on exertion.  Which he has had for awhile.  He has not been very active.  He is also has had to be since you several times for anemia.  He denies any chest pain associated with his dyspnea.  No PND or orthopnea.  He does get out of breath with walking.  He does have some swelling of lower extremities.  He has not had any significant bleeding recently and has not required any hospitalizations in the last couple months.    he also complains of insomnia.  His wife complains a sleeps the lot during the day which he denies.  He says he has trouble falling asleep and staying asleep.      He comes to follow up recent hospitalization  He comes in to follow up his anemia and   epistaxis.  He was in the hospital 12/30/2024.  He hd a repeat nose cauterization and if has helped.  He dis bleed some this morning.  Last saw ENT 1/7/2025.    He previously undergone bilateral SP ligation followed by bilateral embolization, cautery on the left septum for persistent bleeding on 11/03/2023 and again recently on 12/18/23. He   underwent left AEA ligation on 2/28/24.   He is on coumadin and is getting INR checked today.       He has a past medical history of CAD s/p CABG 1990's, mechanical AVR (on warfarin c/b long standing epistaxis) c/b mod-severe MR w/ mitraclip, afib, GIB 2/2 diverticulosis s/p partial  colon resection, T2DM (diet controlled), gout, HLD, HTN, CKD4 (Cr 2.3-2.5), and recurrent epistaxis on warfarin.     Past Medical History:   Diagnosis Date    Anemia of chronic renal failure, stage 3 (moderate) 05/27/2015    Anticoagulant long-term use     Atherosclerosis of coronary artery bypass graft of native heart without angina pectoris 09/11/2012    3-27-18 [facility] Two vessel coronary artery disease.   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    Bilateral carotid artery disease 02/09/2017    Bleeding from the nose     Bleeding nose 03/21/2018    Cancer     Cataract     CHF (congestive heart failure)     CKD (chronic kidney disease) stage 3, GFR 30-59 ml/min 05/27/2015    Claudication of left lower extremity 09/17/2014    Colon polyp     Encounter for blood transfusion     Gastroesophageal reflux disease without esophagitis 03/19/2018    Gastrointestinal hemorrhage associated with intestinal diverticulosis 04/01/2018    Glaucoma     H/O mechanical aortic valve replacement 09/17/2014    History of gout 09/26/2012    Hyperparathyroidism due to renal insufficiency 07/27/2015    Internal hemorrhoid 04/03/2018    Long term current use of anticoagulant therapy 09/26/2012    Mechanical heart valve present     Metabolic acidosis with normal anion gap and bicarbonate losses 03/20/2018    Mixed hyperlipidemia 09/26/2012    NSTEMI (non-ST elevated myocardial infarction) 03/21/2018    Obesity, diabetes, and hypertension syndrome 02/23/2016    Paroxysmal atrial fibrillation 02/06/2023    PVD (peripheral vascular disease) 09/11/2012    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  He looks tired.  Skin coloring is good.  Neck is supple.  No JVD.  Thyroid is not enlarged.  Cardiovascular shows S1, S2 with both systolic and diastolic murmurs.  Abdomen is soft and nontender.  Bowel sounds are normal.  Lungs are clear bilaterally.  There is no wheezing.  No dullness to percussion.  Lower extremities has trace edema bilaterally.      Assessment and plan:    84-year-old gentleman that has been very inactive with dyspnea on exertion.  We are going to recheck a CBC to make sure he is has not becoming anemic but he has not had very much epistaxis.  I did get it back 10.4 and 33.7 sat looks pretty good.  He has been quite sedentary for quite awhile.  We discussed a gated exercise program.  Reviewed note from Cardiology back in October.  He does have cardiomyopathy with EF of about 40-45%.  He also has grade 3 diastolic dysfunction with moderate to severe mitral valve regurgitation.  He does not look like he is in overt heart failure right now serious I imagine his dyspnea on exertion is multifactorial secondary to 1 chronic anemia 2.  Heart failure 3.  And being sedentary in all his recent hospitalist physicians in illnesses he had to deal with, and lastly age.  I discussed with him physical therapy but they do not want to do this at this time.  I will check a chemistry and a CBC to make sure we have not had acute worsening of his anemia.  His coloring looks good so I do not think that is a problem.  We are going to try to have him up and moving around during the day and we will give a low Seroquel at night to help with the asleep.    LÓPEZ (dyspnea on exertion)    Anemia, unspecified type  -     CBC Auto Differential; Future; Expected date: 05/08/2025    Stage 3b chronic kidney " disease  -     Comprehensive Metabolic Panel; Future; Expected date: 05/08/2025    Insomnia, unspecified type           -     QUEtiapine (SEROQUEL) 50 MG tablet; Take 1 tablet (50 mg total) by mouth every evening.  Dispense: 30 tablet; Refill: 11     Our office providers are the focal point for all needed health care services that are part of ongoing care related to a patient's single serious condition or the patient's complex conditions.

## 2025-05-16 NOTE — TELEPHONE ENCOUNTER
I need to know a reason and then I will be happy to put referral in-- I did not get answer when I called today

## 2025-05-19 ENCOUNTER — LAB VISIT (OUTPATIENT)
Dept: LAB | Facility: HOSPITAL | Age: 85
End: 2025-05-19
Attending: INTERNAL MEDICINE
Payer: MEDICARE

## 2025-05-19 ENCOUNTER — TELEPHONE (OUTPATIENT)
Dept: INTERNAL MEDICINE | Facility: CLINIC | Age: 85
End: 2025-05-19
Payer: MEDICARE

## 2025-05-19 ENCOUNTER — ANTI-COAG VISIT (OUTPATIENT)
Dept: CARDIOLOGY | Facility: CLINIC | Age: 85
End: 2025-05-19
Payer: MEDICARE

## 2025-05-19 DIAGNOSIS — Z95.2 H/O MECHANICAL AORTIC VALVE REPLACEMENT: ICD-10-CM

## 2025-05-19 DIAGNOSIS — Z79.01 CHRONIC ANTICOAGULATION: ICD-10-CM

## 2025-05-19 DIAGNOSIS — Z79.01 CHRONIC ANTICOAGULATION: Primary | ICD-10-CM

## 2025-05-19 DIAGNOSIS — K22.2 ESOPHAGEAL STENOSIS: Primary | ICD-10-CM

## 2025-05-19 LAB
INR PPP: 2.2 (ref 0.8–1.2)
PROTHROMBIN TIME: 23.2 SECONDS (ref 9–12.5)

## 2025-05-19 PROCEDURE — 93793 ANTICOAG MGMT PT WARFARIN: CPT | Mod: S$GLB,,,

## 2025-05-19 PROCEDURE — 36415 COLL VENOUS BLD VENIPUNCTURE: CPT | Mod: HCNC

## 2025-05-19 PROCEDURE — 85610 PROTHROMBIN TIME: CPT | Mod: HCNC

## 2025-05-19 NOTE — TELEPHONE ENCOUNTER
Pt wife requesting GI referral because she feels pt esophagus needs to be stretched. Pt is having trouble swallowing, and when he eats or drinks he's either coughing or choking. She said her brother was having these same problems and went through this before which is why she feels he needs this.    Lory GALLAGHER

## 2025-05-19 NOTE — PROGRESS NOTES
[facility]    2025 11:41 AM    Assessment/Plan:    Patient presents today with therapeutic INR.    Assessment of patient findings and chart review: Reviewed    Recommendation for patient's warfarin regimen: Continue current maintenance dose    Recommend repeat INR in 1 week  _________________________________________________________________    Dariel Devon Urbina (84 y.o.) is followed by the [facility].    Anticoagulation Summary  As of 2025      INR goal:  2.0-2.5   TTR:  65.6% (12.6 y)   INR used for dosin.2 (2025)   Warfarin maintenance plan:  5 mg (5 mg x 1) every Sun, Tue, Thu; 2.5 mg (5 mg x 0.5) all other days   Weekly warfarin total:  25 mg   Plan last modified:  Laurie Patino, PharmD (2025)   Next INR check:  2025   Target end date:  --    Indications    Chronic anticoagulation [Z79.01]  H/O mechanical aortic valve replacement [Z95.2]                 Anticoagulation Episode Summary       INR check location:  Clinic Lab    Preferred lab:  --    Send INR reminders to:  [facility] COUMADIN MONITORING POOL    Comments:  [facility] IM - Internal Med Clinic only Mon - Thu // carpel tunnel release  - target low end of range          Anticoagulation Care Providers       Provider Role Specialty Phone number    Devon Langston Jr., MD [facility] Internal Medicine [phone]

## 2025-05-19 NOTE — TELEPHONE ENCOUNTER
----- Message from Joyce sent at 5/19/2025  8:32 AM CDT -----  Contact: [phone]  Type: Returning a callWho left a message? DDWhen did the practice call?5/16/25Does patient know what this is regarding:possibly referral request Would the patient rather a call back or a response via [facility]? Call Comments:

## 2025-05-21 ENCOUNTER — OFFICE VISIT (OUTPATIENT)
Dept: GASTROENTEROLOGY | Facility: CLINIC | Age: 85
End: 2025-05-21
Payer: MEDICARE

## 2025-05-21 VITALS
BODY MASS INDEX: 24.16 KG/M2 | WEIGHT: 145 LBS | DIASTOLIC BLOOD PRESSURE: 70 MMHG | HEART RATE: 57 BPM | HEIGHT: 65 IN | SYSTOLIC BLOOD PRESSURE: 147 MMHG

## 2025-05-21 DIAGNOSIS — R13.10 DYSPHAGIA, UNSPECIFIED TYPE: Primary | ICD-10-CM

## 2025-05-21 DIAGNOSIS — R04.0 RECURRENT EPISTAXIS: ICD-10-CM

## 2025-05-21 DIAGNOSIS — Z95.2 HEART VALVE REPLACED: ICD-10-CM

## 2025-05-21 DIAGNOSIS — Z79.01 ENCOUNTER FOR CURRENT LONG-TERM USE OF ANTICOAGULANTS: ICD-10-CM

## 2025-05-21 PROCEDURE — 1101F PT FALLS ASSESS-DOCD LE1/YR: CPT | Mod: CPTII,HCNC,S$GLB,

## 2025-05-21 PROCEDURE — 3078F DIAST BP <80 MM HG: CPT | Mod: CPTII,HCNC,S$GLB,

## 2025-05-21 PROCEDURE — 3077F SYST BP >= 140 MM HG: CPT | Mod: CPTII,HCNC,S$GLB,

## 2025-05-21 PROCEDURE — 99999 PR PBB SHADOW E&M-EST. PATIENT-LVL III: CPT | Mod: PBBFAC,HCNC,,

## 2025-05-21 PROCEDURE — 1126F AMNT PAIN NOTED NONE PRSNT: CPT | Mod: CPTII,HCNC,S$GLB,

## 2025-05-21 PROCEDURE — 99214 OFFICE O/P EST MOD 30 MIN: CPT | Mod: HCNC,S$GLB,,

## 2025-05-21 PROCEDURE — 1159F MED LIST DOCD IN RCRD: CPT | Mod: CPTII,HCNC,S$GLB,

## 2025-05-21 PROCEDURE — 3288F FALL RISK ASSESSMENT DOCD: CPT | Mod: CPTII,HCNC,S$GLB,

## 2025-05-21 NOTE — SUBJECTIVE & OBJECTIVE
Stable on vitamin D supplements   Interval History:   5/31, developed pressure like chest pain and SOB prior to blood transfusion. Rales head on bedside assessment. Increase in oxygen requirements from 5 L to 10 L simple face mask given oxygen saturation of 89%. EKG similar to prior and mildly elevated troponin. Given total of IV lasix 80 mg with improvement in symptoms.     Review of Systems   Constitutional:  Negative for chills and fever.   Respiratory:  Positive for shortness of breath.    Cardiovascular:  Positive for chest pain. Negative for palpitations and leg swelling.   Gastrointestinal:  Positive for constipation. Negative for abdominal pain, nausea and vomiting.     Objective:     Vital Signs (Most Recent):  Temp: 97.4 °F (36.3 °C) (05/31/24 1147)  Pulse: 60 (05/31/24 1149)  Resp: 17 (05/31/24 1147)  BP: (!) 153/65 (05/31/24 1147)  SpO2: 100 % (05/31/24 1149) Vital Signs (24h Range):  Temp:  [97 °F (36.1 °C)-98 °F (36.7 °C)] 97.4 °F (36.3 °C)  Pulse:  [47-82] 60  Resp:  [17-20] 17  SpO2:  [93 %-100 %] 100 %  BP: (118-153)/(46-72) 153/65     Weight: 78.5 kg (173 lb 1 oz)  Body mass index is 28.8 kg/m².    Intake/Output Summary (Last 24 hours) at 5/31/2024 1334  Last data filed at 5/31/2024 1215  Gross per 24 hour   Intake 1021 ml   Output 800 ml   Net 221 ml         Physical Exam  Vitals reviewed.   Constitutional:       General: He is not in acute distress.     Appearance: He is ill-appearing.   HENT:      Head: Normocephalic.   Eyes:      Extraocular Movements: Extraocular movements intact.   Cardiovascular:      Rate and Rhythm: Normal rate. Rhythm irregular.   Pulmonary:      Effort: Respiratory distress present.      Breath sounds: Rales present. No wheezing.   Abdominal:      General: There is no distension.      Palpations: Abdomen is soft.      Tenderness: There is no abdominal tenderness. There is no guarding.   Musculoskeletal:      Cervical back: Normal range of motion.   Skin:     Comments: Small open wound to L cheek with  active oozing.    Neurological:      Mental Status: He is alert. Mental status is at baseline.             Significant Labs: All pertinent labs within the past 24 hours have been reviewed.    Significant Imaging: I have reviewed all pertinent imaging results/findings within the past 24 hours.

## 2025-05-21 NOTE — PROGRESS NOTES
"GENERAL GI PATIENT INTAKE:    COVID symptoms in the last 7 days (runny nose, sore throat, congestion, cough, fever): No  PCP: Devon Langston Jr.  If not PCP-  number given to establish [phone]: N/A    ALLERGIES REVIEWED:  Yes    CHIEF COMPLAINT:    Chief Complaint   Patient presents with    Dysphagia       VITAL SIGNS:  BP (!) 147/70   Pulse (!) 57   Ht 5' 5" (1.651 m)   Wt 65.8 kg (145 lb)   BMI 24.13 kg/m²      Change in medical, surgical, family or social history: No      REVIEWED MEDICATION LIST RECONCILED INCLUDING ABOVE MEDS:  Yes     "

## 2025-05-21 NOTE — PROGRESS NOTES
Gastroenterology Clinic Consultation Note    Patient ID: Dariel Urbina is a 84 y.o. male.    Chief Complaint: Dysphagia    History of Present Illness    CHIEF COMPLAINT:  Patient presents today for difficulty swallowing.    HISTORY OF PRESENT ILLNESS:  He reports progressive dysphagia that began over a year ago and has been worsening. He experiences throat tightness with cold beverages and must take very small sips of cold water. He reports coughing and choking with eating. He denies pain with swallowing, food getting stuck, and any history of pneumonia or aspiration.    MEDICAL HISTORY:  He has atrial fibrillation with mechanical valve requiring Coumadin therapy, which is managed by the Coumadin clinic. He has a history of severe epistaxis requiring multiple blood draws, with four visits for treatment. He denies diabetes or pre-diabetes.    FAMILY HISTORY:  His brother requires esophageal dilation every 6 months due to recurrent dysphagia.      ROS:  General: -fever, -chills, -fatigue, -weight gain, -weight loss  Eyes: -vision changes, -redness, -discharge  ENT: -ear pain, -nasal congestion, -sore throat, +difficulty swallowing, +choking sensation, +nosebleeds  Cardiovascular: -chest pain, -palpitations, -lower extremity edema  Respiratory: +cough, -shortness of breath  Gastrointestinal: -abdominal pain, -nausea, -vomiting, -diarrhea, -constipation, -blood in stool  Genitourinary: -dysuria, -hematuria, -frequency  Musculoskeletal: -joint pain, -muscle pain  Skin: -rash, -lesion  Neurological: -headache, -dizziness, -numbness, -tingling  Psychiatric: -anxiety, -depression, -sleep difficulty         Physical Exam    General: No acute distress. Well-developed. Well-nourished.  Eyes: EOMI. Sclerae anicteric.  HENT: Normocephalic. Atraumatic. Nares patent. Moist oral mucosa.  Ears: Bilateral TMs clear. Bilateral EACs clear.  Cardiovascular: Regular rate. Regular rhythm. No murmurs. No rubs. No gallops. Normal S1,  S2.  Respiratory: Normal respiratory effort. Clear to auscultation bilaterally. No rales. No rhonchi. No wheezing.  Abdomen: Soft. Non-tender. Non-distended. Normoactive bowel sounds.  Musculoskeletal: No  obvious deformity.  Extremities: No lower extremity edema.  Neurological: Alert & oriented x3. No slurred speech. Normal gait.  Psychiatric: Normal mood. Normal affect. Good insight. Good judgment.  Skin: Warm. Dry. No rash.         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 without angina pectoris (09/11/2012), Bilateral carotid artery disease (02/09/2017), Bleeding from the nose, Bleeding nose (03/21/2018), Cancer, Cataract, CHF (congestive heart failure), CKD (chronic kidney disease) stage 3, GFR 30-59 ml/min (05/27/2015), Claudication of left lower extremity (09/17/2014), Colon polyp, Encounter for blood transfusion, Gastroesophageal reflux disease without esophagitis (03/19/2018), Gastrointestinal hemorrhage associated with intestinal diverticulosis (04/01/2018), Glaucoma, H/O mechanical aortic valve replacement (09/17/2014), History of gout (09/26/2012), Hyperparathyroidism due to renal insufficiency (07/27/2015), Internal hemorrhoid (04/03/2018), Long term current use of anticoagulant therapy (09/26/2012), Mechanical heart valve present, Metabolic acidosis with normal anion gap and bicarbonate losses (03/20/2018), Mixed hyperlipidemia (09/26/2012), NSTEMI (non-ST elevated myocardial infarction) (03/21/2018), Obesity, diabetes, and hypertension syndrome (02/23/2016), Paroxysmal atrial fibrillation (02/06/2023), PVD (peripheral vascular disease) (09/11/2012), Renovascular hypertension (09/26/2012), 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 (02/01/2023), Syncope (09/29/2022), Type 2 diabetes mellitus with diabetic peripheral angiopathy without  gangrene (05/27/2015), Type 2 diabetes mellitus with stage 3 chronic kidney disease, without long-term current use of insulin (10/02/2013), and Volume overload (5/30/2024).    Surgical History:  has a past surgical history that includes Cardiac valuve replacement; Cardiac catheterization; Coronary artery bypass graft; Coronary angioplasty; Cardiac valve replacement; Spine surgery; Vasectomy; Colon surgery; Colonoscopy (N/A, 03/31/2017); Hernia repair; Colonoscopy (N/A, 04/03/2018); Colonoscopy (N/A, 08/13/2018); Carpal tunnel release (Right, 05/19/2020); Coronary angiography (N/A, 10/04/2021); Coronary bypass graft angiography (10/04/2021); Phacoemulsification of cataract (Left, 11/13/2022); Intraocular prosthesis insertion (Left, 11/13/2022); Cataract extraction (Left, 11/13/2022); Phacoemulsification of cataract (Right, 12/04/2022); Intraocular prosthesis insertion (Right, 12/04/2022); Eye surgery; Back surgery; Coronary angiography (N/A, 3/2/2023); Coronary bypass graft angiography (3/2/2023); Transesophageal echocardiography (N/A, 3/22/2023); Transesophageal echocardiography (N/A, 4/11/2023); echocardiogram,transesophageal (N/A, 4/14/2023); Functional endoscopic sinus surgery (FESS) (Bilateral, 6/14/2023); ligation, artery, sphenopalatine, endoscopic (Bilateral, 6/14/2023); Endoscopic nasal cauterization (Bilateral, 11/3/2023); Endoscopic nasal cauterization (N/A, 12/18/2023); ligation, artery, ethmoidal (Left, 2/28/2024); fess, with imaging guidance (Left, 2/28/2024); control of epistaxis, posterior, using nasal packing or cauterization (Bilateral, 6/18/2024); control of epistaxis, posterior, using nasal packing or cauterization (Bilateral, 8/8/2024); Nasal endoscopy (N/A, 8/8/2024); Fluoroscopy (Bilateral, 10/29/2024); control of epistaxis, posterior, using nasal packing or cauterization (Bilateral, 12/30/2024); Endoscopic nasal cauterization (N/A, 2/26/2025); and Endoscopic nasal cauterization (Bilateral,  "3/6/2025).    Family History: family history includes Alcohol abuse in his father; Diabetes in his brother; Heart disease in his brother, father, mother, and sister; Heart failure in his brother, father, and mother; No Known Problems in his maternal aunt, maternal grandfather, maternal grandmother, maternal uncle, paternal aunt, paternal grandfather, paternal grandmother, paternal uncle, and sister..       Review of patient's allergies indicates:   Allergen Reactions    Lisinopril     Losartan      Intolerance- elevates potassium level       Medications Ordered Prior to Encounter[1]    Labs:  Lab Results   Component Value Date    WBC 4.42 05/08/2025    HGB 10.4 (L) 05/08/2025    HCT 33.7 (L) 05/08/2025     05/08/2025    CRP 3.4 05/25/2010    CHOL 96 (L) 05/29/2024    TRIG 144 05/29/2024    HDL 31 (L) 05/29/2024    ALKPHOS 101 05/08/2025    LIPASE 35 10/20/2022    ALT 8 (L) 05/08/2025    AST 17 05/08/2025     05/08/2025    K 4.5 05/08/2025     05/08/2025    CREATININE 2.2 (H) 05/08/2025    BUN 33 (H) 05/08/2025    CO2 26 05/08/2025    TSH 3.705 09/29/2022    PSA 0.35 07/27/2012    INR 2.2 (H) 05/19/2025    HGBA1C 5.1 12/13/2024       Vital Signs:  BP (!) 147/70   Pulse (!) 57   Ht 5' 5" (1.651 m)   Wt 65.8 kg (145 lb)   BMI 24.13 kg/m²   Body mass index is 24.13 kg/m².    Imaging reviewed: No pertinent imaging reviewed       Endoscopy reviewed: Colonoscopy 8/13/2018  Impression:           - Hemorrhoids found on perianal exam.                         - Mild diverticulosis in the descending colon.                         There was no evidence of diverticular bleeding.                         - One 9 mm polyp at the splenic flexure, removed                         with a cold snare. Resected and retrieved. THis was                         located near the prior tattoo site                         - The examination was otherwise normal on direct                         and retroflexion views. "   Recommendation:       - Discharge patient to home.                         - Resume previous diet.                         - Continue present medications.                         - Await pathology results.                         - Repeat colonoscopy is not recommended based on age   Attending Participation:        I personally performed the entire procedure.   Kam Barba MD   8/13/2018 9:43:07 AM     Colonoscopy 4/3/2018  Per colonoscopy report: Hemorrhoids found on perianal exam. Blood in the rectum and in the sigmoid colon. Diverticulosis in the sigmoid colon. There was active bleeding coming from the diverticular opening. Clip (MR conditional) was placed. One 4mm polyp in descending colon and one 10mm polyp at splenic flexure, not resected      Assessment:  1. Dysphagia, unspecified type    2. Encounter for current long-term use of anticoagulants    3. Recurrent epistaxis    4. Heart valve replaced           Assessment & Plan      DYSPHAGIA (DIFFICULTY SWALLOWING):  - Considered esophageal evaluation due to reported difficulty swallowing, particularly with cold liquids and during meals.  - Noted potential esophageal spasm as a possible cause of symptoms.  - Explained anatomy of trachea and esophagus in relation to swallowing difficulties.  - Discussed potential causes of swallowing issues, including structural problems and motility issues.  - Patient to try taking 3 Altoid peppermints before eating to help calm the esophagus, specifically peppermint Altoids (avoid spearmint).  - Ordered upper endoscopy to evaluate esophagus and assess esophageal structure and rule out strictures.  - Referred to gastroenterology for upper endoscopy to evaluate esophagus.    EPISTAXIS (NOSEBLEEDS):  - Considered conservative management due to history of significant epistaxis, which precludes manometry testing.  - Recognized limitations in diagnostic workup due to nosebleed history.    PROSTHETIC HEART VALVE AND  ANTICOAGULATION:  - Acknowledged need for Coumadin management and bridging with Lovenox prior to endoscopy due to mechanical valve and anticoagulation therapy.    ENDOSCOPY PROCEDURE:  - Explained the upper endoscopy procedure, including anesthesia use, duration, and recovery process.           No follow-ups on file.        PAOLA EASTON  Gastroenterology Department  [facility] Office [phone]      This note was generated with the assistance of ambient listening technology. Verbal consent was obtained by the patient and accompanying visitor(s) for the recording of patient appointment to facilitate this note. I attest to having reviewed and edited the generated note for accuracy, though some syntax or spelling errors may persist. Please contact the author of this note for any clarification.                       [1]   Current Outpatient Medications on File Prior to Visit   Medication Sig Dispense Refill    acetaminophen (TYLENOL) 500 MG tablet Take 500 mg by mouth daily as needed for Pain.      albuterol (VENTOLIN HFA) 90 mcg/actuation inhaler Inhale 2 puffs into the lungs every 6 (six) hours as needed for Wheezing. Rescue 18 g 3    allopurinoL (ZYLOPRIM) 100 MG tablet Take 1 tablet (100 mg total) by mouth once daily. 90 tablet 3    bumetanide (BUMEX) 1 MG tablet TAKE 2 TABLETS EVERY OTHER DAY 90 tablet 3    ferrous gluconate (FERGON) 324 MG tablet TAKE 1 TABLET EVERY DAY WITH BREAKFAST 90 tablet 3    isosorbide mononitrate (IMDUR) 60 MG 24 hr tablet Take 1 tablet (60 mg total) by mouth every evening. 90 tablet 3    ketoconazole (NIZORAL) 2 % cream Apply topically 2 (two) times daily. For dry skin around nose and ears 60 g 1    omega-3 fatty acids/fish oil (FISH OIL-OMEGA-3 FATTY ACIDS) 300-1,000 mg capsule Take 1 capsule by mouth once daily.      oxymetazoline (AFRIN) 0.05 % nasal spray 2 sprays by Nasal route as needed (nose bleeds). 30 mL 0    QUEtiapine (SEROQUEL) 50 MG tablet  Take 1 tablet (50 mg total) by mouth every evening. 30 tablet 11    rosuvastatin (CRESTOR) 40 MG Tab TAKE 1 TABLET EVERY EVENING. 90 tablet 3    sodium chloride (SALINE NASAL) 0.65 % nasal spray Use 2 sprays by Nasal route every 4 hours. Apply into nasal cavities daily with nightly application of vaseline/aquaphor to anterior nares. Keep head of bed elevated. 60 mL 3    warfarin (COUMADIN) 5 MG tablet Take 2.5 mg (one-half tablet) daily until you follow up with coumadin clinic      sodium bicarbonate 650 MG tablet Take 1 tablet (650 mg total) by mouth once daily. (Patient not taking: Reported on 5/21/2025) 30 tablet 11     No current facility-administered medications on file prior to visit.

## 2025-05-26 ENCOUNTER — LAB VISIT (OUTPATIENT)
Dept: LAB | Facility: HOSPITAL | Age: 85
End: 2025-05-26
Attending: INTERNAL MEDICINE
Payer: MEDICARE

## 2025-05-26 ENCOUNTER — ANTI-COAG VISIT (OUTPATIENT)
Dept: CARDIOLOGY | Facility: CLINIC | Age: 85
End: 2025-05-26
Payer: MEDICARE

## 2025-05-26 DIAGNOSIS — Z79.01 CHRONIC ANTICOAGULATION: ICD-10-CM

## 2025-05-26 DIAGNOSIS — Z95.2 H/O MECHANICAL AORTIC VALVE REPLACEMENT: ICD-10-CM

## 2025-05-26 DIAGNOSIS — Z79.01 CHRONIC ANTICOAGULATION: Primary | ICD-10-CM

## 2025-05-26 LAB
INR PPP: 2.5 (ref 0.8–1.2)
PROTHROMBIN TIME: 25.3 SECONDS (ref 9–12.5)

## 2025-05-26 PROCEDURE — 93793 ANTICOAG MGMT PT WARFARIN: CPT | Mod: S$GLB,,,

## 2025-05-26 PROCEDURE — 36415 COLL VENOUS BLD VENIPUNCTURE: CPT | Mod: HCNC

## 2025-05-26 PROCEDURE — 85610 PROTHROMBIN TIME: CPT | Mod: HCNC

## 2025-05-26 NOTE — PROGRESS NOTES
[email or web address]    2025 12:44 PM    Assessment/Plan:    Patient presents today with therapeutic INR.    Assessment of patient findings and chart review: Reviewed    Recommendation for patient's warfarin regimen: Continue current maintenance dose    Recommend repeat INR in 2 weeks  _________________________________________________________________    Dariel Devon Urbina (84 y.o.) is followed by the [facility].    Anticoagulation Summary  As of 2025      INR goal:  2.0-2.5   TTR:  65.7% (12.6 y)   INR used for dosin.5 (2025)   Warfarin maintenance plan:  5 mg (5 mg x 1) every Sun, Tue, Thu; 2.5 mg (5 mg x 0.5) all other days   Weekly warfarin total:  25 mg   Plan last modified:  Laurie Patino, PharmD (2025)   Next INR check:  2025   Target end date:  --    Indications    Chronic anticoagulation [Z79.01]  H/O mechanical aortic valve replacement [Z95.2]                 Anticoagulation Episode Summary       INR check location:  Clinic Lab    Preferred lab:  --    Send INR reminders to:  [facility] COUMADIN MONITORING POOL    Comments:  [facility] IM - Internal Med Clinic only Mon - Thu // carpel tunnel release  - target low end of range          Anticoagulation Care Providers       Provider Role Specialty Phone number    Devon Langston Jr., MD [facility] [phone]

## 2025-05-29 NOTE — PROGRESS NOTES
5/29/25  Wife called to reschedule 6/09 lab appointment to 6/10 due to having another appointment on 6/10

## 2025-06-03 ENCOUNTER — OUTPATIENT CASE MANAGEMENT (OUTPATIENT)
Dept: ADMINISTRATIVE | Facility: OTHER | Age: 85
End: 2025-06-03
Payer: MEDICARE

## 2025-06-03 RX ORDER — ROSUVASTATIN CALCIUM 40 MG/1
40 TABLET, COATED ORAL NIGHTLY
Qty: 90 TABLET | Refills: 3 | Status: SHIPPED | OUTPATIENT
Start: 2025-06-03

## 2025-06-03 NOTE — PROGRESS NOTES
[facility]    2024 12:44 PM    Assessment/Plan:    Patient presents today with therapeutic INR.    Assessment of patient findings and chart review: Pt previously supratherapeutic due to decrease in appetite & weight loss.     Recommendation for patient's warfarin regimen: Decrease maintenance dose    Recommend repeat INR Monday.   _________________________________________________________________    Dariel Devon Urbina (83 y.o.) is followed by the [facility].    Anticoagulation Summary  As of 2024      INR goal:  2.0-3.0   TTR:  67.7% (11.9 y)   INR used for dosin.3 (2024)   Warfarin maintenance plan:  2.5 mg (5 mg x 0.5) every Mon, Fri; 5 mg (5 mg x 1) all other days   Weekly warfarin total:  30 mg   Plan last modified:  Laurie Patino, PharmD (2024)   Next INR check:  2024   Target end date:      Indications    Chronic anticoagulation [Z79.01]  H/O mechanical aortic valve replacement [Z95.2]                 Anticoagulation Episode Summary       INR check location:  Clinic Lab    Preferred lab:      Send INR reminders to:  [facility] COUMADIN MONITORING POOL    Comments:  [facility] - Internal Med Clinic only Mon - Thu // carpel tunnel release  - target low end of range          Anticoagulation Care Providers       Provider Role Specialty Phone number    Devon Garnica MD [facility] Cardiology [phone]                            
right hand: mild inflammation and swelling with ttp noted to palmar aspect of distal right 3rd finger, no increased warmth or streaking noted, FROM right middle finger, nl cap refill, distal sensation intact, pulses intact, NVI

## 2025-06-07 ENCOUNTER — LAB VISIT (OUTPATIENT)
Dept: LAB | Facility: HOSPITAL | Age: 85
End: 2025-06-07
Attending: INTERNAL MEDICINE
Payer: MEDICARE

## 2025-06-07 DIAGNOSIS — Z79.01 CHRONIC ANTICOAGULATION: ICD-10-CM

## 2025-06-07 DIAGNOSIS — Z95.2 H/O MECHANICAL AORTIC VALVE REPLACEMENT: ICD-10-CM

## 2025-06-07 LAB
INR PPP: 1.9 (ref 0.8–1.2)
PROTHROMBIN TIME: 19.6 SECONDS (ref 9–12.5)

## 2025-06-07 PROCEDURE — 36415 COLL VENOUS BLD VENIPUNCTURE: CPT

## 2025-06-07 PROCEDURE — 85610 PROTHROMBIN TIME: CPT

## 2025-06-09 ENCOUNTER — ANTI-COAG VISIT (OUTPATIENT)
Dept: CARDIOLOGY | Facility: CLINIC | Age: 85
End: 2025-06-09
Payer: MEDICARE

## 2025-06-09 DIAGNOSIS — Z95.2 H/O MECHANICAL AORTIC VALVE REPLACEMENT: ICD-10-CM

## 2025-06-09 DIAGNOSIS — Z79.01 CHRONIC ANTICOAGULATION: Primary | ICD-10-CM

## 2025-06-09 PROCEDURE — 93793 ANTICOAG MGMT PT WARFARIN: CPT | Mod: S$GLB,,,

## 2025-06-09 NOTE — PROGRESS NOTES
[facility]    2025 10:05 AM    Assessment/Plan:    Patient presents today with subtherapeutic  INR.    Assessment of patient findings and chart review: Pt had some nose bleeding that resolved.  INR is from .  Will continue current dosing.     Recommendation for patient's warfarin regimen: Continue current maintenance dose    Recommend repeat INR in 1 week  _________________________________________________________________    Dariel Urbina (84 y.o.) is followed by the [facility].    Anticoagulation Summary  As of 2025      INR goal:  2.0-2.5   TTR:  65.7% (12.7 y)   INR used for dosin.9 (2025)   Warfarin maintenance plan:  5 mg (5 mg x 1) every Sun, Tue, Thu; 2.5 mg (5 mg x 0.5) all other days   Weekly warfarin total:  25 mg   Plan last modified:  Laurie Patino, PharmD (2025)   Next INR check:  --   Target end date:  --    Indications    Chronic anticoagulation [Z79.01]  H/O mechanical aortic valve replacement [Z95.2]                 Anticoagulation Episode Summary       INR check location:  Clinic Lab    Preferred lab:  --    Send INR reminders to:  [facility] COUMADIN MONITORING POOL    Comments:  [facility] IM - Internal Med Clinic only Mon - Thu // carpel tunnel release  - target low end of range          Anticoagulation Care Providers       Provider Role Specialty Phone number    Devon Langston Jr., MD [facility] Internal Medicine [phone]

## 2025-06-10 ENCOUNTER — OUTPATIENT CASE MANAGEMENT (OUTPATIENT)
Dept: ADMINISTRATIVE | Facility: OTHER | Age: 85
End: 2025-06-10
Payer: MEDICARE

## 2025-06-10 ENCOUNTER — OFFICE VISIT (OUTPATIENT)
Dept: NEPHROLOGY | Facility: CLINIC | Age: 85
End: 2025-06-10
Payer: MEDICARE

## 2025-06-10 VITALS
BODY MASS INDEX: 23.85 KG/M2 | WEIGHT: 143.31 LBS | DIASTOLIC BLOOD PRESSURE: 53 MMHG | SYSTOLIC BLOOD PRESSURE: 130 MMHG | OXYGEN SATURATION: 95 % | HEART RATE: 56 BPM

## 2025-06-10 DIAGNOSIS — R80.9 PROTEINURIA, UNSPECIFIED TYPE: ICD-10-CM

## 2025-06-10 DIAGNOSIS — I10 PRIMARY HYPERTENSION: ICD-10-CM

## 2025-06-10 DIAGNOSIS — N25.81 SECONDARY HYPERPARATHYROIDISM OF RENAL ORIGIN: ICD-10-CM

## 2025-06-10 DIAGNOSIS — E11.22 TYPE 2 DIABETES MELLITUS WITH STAGE 4 CHRONIC KIDNEY DISEASE, WITHOUT LONG-TERM CURRENT USE OF INSULIN: ICD-10-CM

## 2025-06-10 DIAGNOSIS — E87.20 ACIDOSIS: ICD-10-CM

## 2025-06-10 DIAGNOSIS — N18.4 CKD (CHRONIC KIDNEY DISEASE) STAGE 4, GFR 15-29 ML/MIN: Primary | ICD-10-CM

## 2025-06-10 DIAGNOSIS — D64.9 ANEMIA, UNSPECIFIED TYPE: ICD-10-CM

## 2025-06-10 DIAGNOSIS — N18.4 TYPE 2 DIABETES MELLITUS WITH STAGE 4 CHRONIC KIDNEY DISEASE, WITHOUT LONG-TERM CURRENT USE OF INSULIN: ICD-10-CM

## 2025-06-10 PROCEDURE — 3078F DIAST BP <80 MM HG: CPT | Mod: CPTII,HCNC,S$GLB, | Performed by: NURSE PRACTITIONER

## 2025-06-10 PROCEDURE — 3288F FALL RISK ASSESSMENT DOCD: CPT | Mod: CPTII,HCNC,S$GLB, | Performed by: NURSE PRACTITIONER

## 2025-06-10 PROCEDURE — 3075F SYST BP GE 130 - 139MM HG: CPT | Mod: CPTII,HCNC,S$GLB, | Performed by: NURSE PRACTITIONER

## 2025-06-10 PROCEDURE — 99999 PR PBB SHADOW E&M-EST. PATIENT-LVL III: CPT | Mod: PBBFAC,HCNC,, | Performed by: NURSE PRACTITIONER

## 2025-06-10 PROCEDURE — 1101F PT FALLS ASSESS-DOCD LE1/YR: CPT | Mod: CPTII,HCNC,S$GLB, | Performed by: NURSE PRACTITIONER

## 2025-06-10 PROCEDURE — 1126F AMNT PAIN NOTED NONE PRSNT: CPT | Mod: CPTII,HCNC,S$GLB, | Performed by: NURSE PRACTITIONER

## 2025-06-10 PROCEDURE — G2211 COMPLEX E/M VISIT ADD ON: HCPCS | Mod: HCNC,S$GLB,, | Performed by: NURSE PRACTITIONER

## 2025-06-10 PROCEDURE — 1159F MED LIST DOCD IN RCRD: CPT | Mod: CPTII,HCNC,S$GLB, | Performed by: NURSE PRACTITIONER

## 2025-06-10 PROCEDURE — 99214 OFFICE O/P EST MOD 30 MIN: CPT | Mod: HCNC,S$GLB,, | Performed by: NURSE PRACTITIONER

## 2025-06-10 NOTE — PROGRESS NOTES
Outpatient Care Management  Plan of Care Follow Up Visit    Patient: Dariel Urbina  MRN: [id number]  Date of Service: 06/10/2025  Completed by: Michaela Tang RN  Referral Date: 12/16/2024    Reason for Visit   Patient presents with    Case Closure       Brief Summary:   MEMBER'S CURRENT STATUS  :  -S&S of CKD:  Patient's spouse, Ameena, states that she does not remember the signs and symptoms of CKD off hand but she does have the educational materials that CM sent her to refer to.  She states he has itchy skin from dryness so they put cream on him after he showers.    -Nose bleeds:  Patient's spouse, Ameena, states that he is still having some nose bleeds but they have not been that bad.      -Shortness of breath:  Ameena states the patient is not short of breath when he lays down anymore.  She states he now sleeps in the bed and not in the recliner.      -2300 mg sodium diet:  Ameena states that the patient is still following a low sodium diet.  She states yesterday he ate hash and rice and every morning he eats a biscuit with jelly for breakfast.  She states day before yesterday he ate two sandwiches with the crust cut off.      -Appetite stimulant:  Ameena states they did not ask Dr. Langston about an appetite stimulant.  She states they told Dr. Langston about him not eating much at all but there was nothing mentioned about a stimulant.      -Fluid intake:  Ameena states that the patient is drinking plenty of fluids.  She states that the nephrology doctor told him to drink more water but the patient is already drinking about 1/2 gallon of water a day.         INTERVENTIONS  :  -S&S of CKD:  CM reiterated the signs and symptoms of CKD including anorexia; fatigue; weakness; difficulty sleeping; confusion; frequent urination, especially at night; muscle cramps at night; edema; periorbital edema, especially in the morning; nausea and vomiting; itchy skin (pruritus); weight gain or decreased urine output.  CM encouraged  Ameena that if the patient starts having any symptoms out of the ordinary, to check the educational materials to see if it could be CKD related and to notify his doctor if it is.      -Nose bleeds:  CM commended Ameena for her part she takes in helping the patient with his nose bleeds and to notify the doctor if they get out of control again.     -Shortness of breath:  CM encouraged Ameena to keep an eye on his breathing and to notify the doctor if he has trouble with this again.    -2300 mg sodium diet:  CM encouraged Ameena to continue to keep him on a low sodium diet.    -Appetite stimulant:  CM encouraged Ameena to talk to Dr. Langston if the patient's eating does not  and he keeps not eating much food.      -Fluid intake:  CM commended Ameena and patient for him drinking so much water.  CM encouraged him to keep up this practice.     -Case closure:  CM discussed case closure with the patient who is in agreement with this decision.  CM instructed patient to keep CM's phone number and to call if they need anything in the future.

## 2025-06-10 NOTE — PROGRESS NOTES
Subjective:       Patient ID: Dariel Urbina is a 84 y.o. white male who presents for follow-up evaluation of CKD.    HPI     Patient is new to me. Last seen by Dr. Shaffer in 3/11/22 for CKD IIIb.  Prior pertinent chart reviewed since this is patient's first appointment with me.  Patient presents with his wife for follow-up of CKD.  Baseline creatinine of CKD IIIb-IV, h/o JIM, HLD, h/o gout, h/o mechanical aortic valve replacement on warfarin, T2DM, CAD s/p CABG, HTN, chronic combined systolic and diastolic heart failure, pAF, mitral insufficiency. Noted several admissions for intermittent CHF exacerbation, supratherapeutic INR, anemia related to epistaxis. Recently presented for outpt KIRSTEN on 4/11/23 for mitralclip eval, however INR supratherapeutic (4.9) on presentation. KIRSTEN performed on 4/14/23 showed moderate mitral regurgitation. Recent JIM on last admission with peak sCr of 3.5. US performed showed bladder mass with concerns for obstructive uropathy and recommendations for Urology eval. Renal function improved with no need for Urology intervention as an inpatient. Patient continued to follow-up as an outpatient with repeat CT without contrast showing no bladder mass or abnormalities. No urologic intervention necessary. Most recent sCr 2.3 in April. Will repeat labs. He denies volume issues on bumex 1mg qod. He denies urinary symptoms. BP stable today. The patient denies taking NSAIDs, herbal supplements, or new antibiotics, recreational drugs, recent episode of dehydration, diarrhea, nausea or vomiting, acute illness. He received contrast with angiogram on 3/2/23.       Significant family hx includes: Mother, father, brother- heart failure/ heart disease    Update 4/3/25:  - Presents with wife for follow-up of CKD. Cr 1.9.  - Has had several admissions recently epistaxis, anemia. Reports doing well over the last month.   - BP at home 130-140/60-70       Review of Systems   Respiratory:  Negative for  shortness of breath.    Cardiovascular:  Positive for leg swelling.   Gastrointestinal:  Negative for diarrhea, nausea and vomiting.   Genitourinary:  Negative for difficulty urinating, dysuria and hematuria.   All other systems reviewed and are negative.      Objective:       Blood pressure (!) 130/53, pulse (!) 56, weight 65 kg (143 lb 4.8 oz), SpO2 95%.  Physical Exam  Vitals reviewed.   Constitutional:       General: He is not in acute distress.  HENT:      Head: Normocephalic and atraumatic.   Eyes:      General: No scleral icterus.  Cardiovascular:      Rate and Rhythm: Normal rate.   Pulmonary:      Effort: Pulmonary effort is normal. No respiratory distress.   Skin:     General: Skin is warm and dry.   Neurological:      Mental Status: He is alert and oriented to person, place, and time.   Psychiatric:         Mood and Affect: Mood normal.         Behavior: Behavior normal.           Lab Results   Component Value Date    CREATININE 2.2 (H) 05/08/2025    URICACID 5.8 07/08/2024     Prot/Creat Ratio, Urine   Date Value Ref Range Status   07/08/2024 0.73 (H) 0.00 - 0.20 Final   05/22/2023 1.48 (H) 0.00 - 0.20 Final   04/13/2023 0.99 (H) 0.00 - 0.20 Final     Lab Results   Component Value Date     05/08/2025    K 4.5 05/08/2025    CO2 26 05/08/2025     05/08/2025     Lab Results   Component Value Date    .6 (H) 07/08/2024    CALCIUM 10.7 (H) 05/08/2025    CAION 1.29 03/20/2018    PHOS 3.9 04/03/2025     Lab Results   Component Value Date    HGB 10.4 (L) 05/08/2025    WBC 4.42 05/08/2025    HCT 33.7 (L) 05/08/2025      Lab Results   Component Value Date    HGBA1C 5.1 12/13/2024     05/08/2025    BUN 33 (H) 05/08/2025     Lab Results   Component Value Date    LDLCALC 36.2 (L) 05/29/2024         Assessment:       1. CKD (chronic kidney disease) stage 4, GFR 15-29 ml/min    2. Proteinuria, unspecified type    3. Acidosis    4. Secondary hyperparathyroidism of renal origin    5. Anemia,  unspecified type    6. Type 2 diabetes mellitus with stage 4 chronic kidney disease, without long-term current use of insulin    7. Primary hypertension            Plan:   CKD stage 4  -  - Baseline Cr ~1.7-2.0, last at baseline around Dec 2022, now trending > 2  - CKD 2/2 HTN, history of AKIs  - Continue glycemic control. T2DM diet controlled. Continue BP control. Monitor volume status. On bumex.     UPCR Sub nephrotic; Negative on last UA. No RASi with h/o hyperkalemia. Repeat next visit.    Acid-base Improved on NaBicarb 650 qd.    Secondary hyperparathyroidism PTH elevated. Ca mildly elevated. Repeat PTH and Vit D.    Anemia At CKD goal, frequent epistaxis on warfarin, following with ENT s/p cauterization    Lipid Management On statin   ESRD planning Provided education about the stages of CKD, usual progression, and need to monitor for and treat CKD-related disease in an effort to delay progression of CKD.        HTN   - Overall has been stable on current regiment    Hyperuricemia  - h/o gout flare, on allopurinol    All questions patient had were answered.  Asked if further questions. None. F/u in clinic 3 months  with labs and urine prior to next visit or sooner if needed.  ER for emergency concerns.    Summary of Plan:  - RTC 3 months with repeat labs      Visit today included increased complexity associated with the care of the patient due to the serious and/or complex managed problem(s) CKD IV, HTN, DM, acidosis, hyperparathyroidism, anemia.

## 2025-06-16 ENCOUNTER — ANTI-COAG VISIT (OUTPATIENT)
Dept: CARDIOLOGY | Facility: CLINIC | Age: 85
End: 2025-06-16
Payer: MEDICARE

## 2025-06-16 ENCOUNTER — LAB VISIT (OUTPATIENT)
Dept: LAB | Facility: HOSPITAL | Age: 85
End: 2025-06-16
Attending: INTERNAL MEDICINE
Payer: MEDICARE

## 2025-06-16 DIAGNOSIS — D62 ACUTE BLOOD LOSS ANEMIA: ICD-10-CM

## 2025-06-16 DIAGNOSIS — N17.9 ACUTE KIDNEY INJURY SUPERIMPOSED ON CHRONIC KIDNEY DISEASE: ICD-10-CM

## 2025-06-16 DIAGNOSIS — Z95.2 H/O MECHANICAL AORTIC VALVE REPLACEMENT: ICD-10-CM

## 2025-06-16 DIAGNOSIS — Z79.01 CHRONIC ANTICOAGULATION: ICD-10-CM

## 2025-06-16 DIAGNOSIS — Z79.01 CHRONIC ANTICOAGULATION: Primary | ICD-10-CM

## 2025-06-16 DIAGNOSIS — N18.9 ACUTE KIDNEY INJURY SUPERIMPOSED ON CHRONIC KIDNEY DISEASE: ICD-10-CM

## 2025-06-16 DIAGNOSIS — N18.4 CKD (CHRONIC KIDNEY DISEASE) STAGE 4, GFR 15-29 ML/MIN: ICD-10-CM

## 2025-06-16 LAB
ABSOLUTE EOSINOPHIL (OHS): 0.14 K/UL
ABSOLUTE MONOCYTE (OHS): 0.47 K/UL (ref 0.3–1)
ABSOLUTE NEUTROPHIL COUNT (OHS): 2.52 K/UL (ref 1.8–7.7)
ALBUMIN SERPL BCP-MCNC: 3.6 G/DL (ref 3.5–5.2)
ALP SERPL-CCNC: 77 UNIT/L (ref 40–150)
ALT SERPL W/O P-5'-P-CCNC: 13 UNIT/L (ref 10–44)
ANION GAP (OHS): 11 MMOL/L (ref 8–16)
AST SERPL-CCNC: 18 UNIT/L (ref 11–45)
BASOPHILS # BLD AUTO: 0.03 K/UL
BASOPHILS NFR BLD AUTO: 0.7 %
BILIRUB SERPL-MCNC: 0.5 MG/DL (ref 0.1–1)
BUN SERPL-MCNC: 41 MG/DL (ref 8–23)
CALCIUM SERPL-MCNC: 10.1 MG/DL (ref 8.7–10.5)
CHLORIDE SERPL-SCNC: 105 MMOL/L (ref 95–110)
CO2 SERPL-SCNC: 22 MMOL/L (ref 23–29)
CREAT SERPL-MCNC: 2.7 MG/DL (ref 0.5–1.4)
ERYTHROCYTE [DISTWIDTH] IN BLOOD BY AUTOMATED COUNT: 15.3 % (ref 11.5–14.5)
GFR SERPLBLD CREATININE-BSD FMLA CKD-EPI: 23 ML/MIN/1.73/M2
GLUCOSE SERPL-MCNC: 86 MG/DL (ref 70–110)
HCT VFR BLD AUTO: 24.5 % (ref 40–54)
HGB BLD-MCNC: 7.7 GM/DL (ref 14–18)
IMM GRANULOCYTES # BLD AUTO: 0.01 K/UL (ref 0–0.04)
IMM GRANULOCYTES NFR BLD AUTO: 0.2 % (ref 0–0.5)
INR PPP: 2.4 (ref 0.8–1.2)
LYMPHOCYTES # BLD AUTO: 1.23 K/UL (ref 1–4.8)
MCH RBC QN AUTO: 30.8 PG (ref 27–31)
MCHC RBC AUTO-ENTMCNC: 31.4 G/DL (ref 32–36)
MCV RBC AUTO: 98 FL (ref 82–98)
NUCLEATED RBC (/100WBC) (OHS): 0 /100 WBC
PHOSPHATE SERPL-MCNC: 4.1 MG/DL (ref 2.7–4.5)
PLATELET # BLD AUTO: 156 K/UL (ref 150–450)
PMV BLD AUTO: 11.5 FL (ref 9.2–12.9)
POTASSIUM SERPL-SCNC: 4.8 MMOL/L (ref 3.5–5.1)
PROT SERPL-MCNC: 7.1 GM/DL (ref 6–8.4)
PROTHROMBIN TIME: 24.6 SECONDS (ref 9–12.5)
RBC # BLD AUTO: 2.5 M/UL (ref 4.6–6.2)
RELATIVE EOSINOPHIL (OHS): 3.2 %
RELATIVE LYMPHOCYTE (OHS): 28 % (ref 18–48)
RELATIVE MONOCYTE (OHS): 10.7 % (ref 4–15)
RELATIVE NEUTROPHIL (OHS): 57.2 % (ref 38–73)
SODIUM SERPL-SCNC: 138 MMOL/L (ref 136–145)
WBC # BLD AUTO: 4.4 K/UL (ref 3.9–12.7)

## 2025-06-16 PROCEDURE — 84100 ASSAY OF PHOSPHORUS: CPT | Mod: HCNC

## 2025-06-16 PROCEDURE — 85610 PROTHROMBIN TIME: CPT | Mod: HCNC

## 2025-06-16 PROCEDURE — 85025 COMPLETE CBC W/AUTO DIFF WBC: CPT | Mod: HCNC

## 2025-06-16 PROCEDURE — 80053 COMPREHEN METABOLIC PANEL: CPT | Mod: HCNC

## 2025-06-16 PROCEDURE — 36415 COLL VENOUS BLD VENIPUNCTURE: CPT | Mod: HCNC

## 2025-06-16 PROCEDURE — 93793 ANTICOAG MGMT PT WARFARIN: CPT | Mod: S$GLB,,,

## 2025-06-16 NOTE — PROGRESS NOTES
[email or web address]    2025 12:36 PM    Assessment/Plan:    Patient presents today with therapeutic INR.    Assessment of patient findings and chart review: Reviewed.  Pt has been taking a higher dose than recommended.  Will adjust to per pt dosing.     Recommendation for patient's warfarin regimen: Per pt dosing.     Recommend repeat INR in 2 weeks  _________________________________________________________________    Dariel Urbina (84 y.o.) is followed by the [facility].    Anticoagulation Summary  As of 2025      INR goal:  2.0-2.5   TTR:  65.8% (12.7 y)   INR used for dosin.4 (2025)   Warfarin maintenance plan:  5 mg (5 mg x 1) every Sun, Tue, Thu; 2.5 mg (5 mg x 0.5) all other days   Weekly warfarin total:  25 mg   Plan last modified:  Laurie Patino, PharmD (2025)   Next INR check:  2025   Target end date:  --    Indications    Chronic anticoagulation [Z79.01]  H/O mechanical aortic valve replacement [Z95.2]                 Anticoagulation Episode Summary       INR check location:  Clinic Lab    Preferred lab:  --    Send INR reminders to:  [facility] COUMADIN MONITORING POOL    Comments:  [facility] IM - Internal Med Clinic only Mon - Thu // carpel tunnel release  - target low end of range          Anticoagulation Care Providers       Provider Role Specialty Phone number    Devon Langston Jr., MD [facility] Internal Medicine [phone]

## 2025-06-23 ENCOUNTER — RESULTS FOLLOW-UP (OUTPATIENT)
Dept: INTERNAL MEDICINE | Facility: CLINIC | Age: 85
End: 2025-06-23

## 2025-06-23 DIAGNOSIS — Z95.2 H/O MECHANICAL AORTIC VALVE REPLACEMENT: ICD-10-CM

## 2025-06-23 DIAGNOSIS — Z79.01 ANTICOAGULANT LONG-TERM USE: ICD-10-CM

## 2025-06-23 DIAGNOSIS — D64.9 ANEMIA, UNSPECIFIED TYPE: Primary | ICD-10-CM

## 2025-06-23 RX ORDER — WARFARIN SODIUM 5 MG/1
TABLET ORAL
Start: 2025-06-23 | End: 2025-06-27 | Stop reason: SDUPTHER

## 2025-06-23 NOTE — TELEPHONE ENCOUNTER
Copied from CRM #[id number]. Topic: Medications - Medication Refill  >> Jun 23, 2025  1:05 PM Melissa wrote:  Requesting an RX refill or new RX.    Is this a refill or new RX: refill    RX name and strength (copy/paste from chart):  warfarin (COUMADIN) 5 MG tablet    Is this a 30 day or 90 day RX: 90    Pharmacy name and phone # (copy/paste from chart):   [facility] - [facility] [address] [facility]  [address] [address]  Phone: [phone] Fax: [phone]        Who called and call back number:pts wife [phone]     The doctors have asked that we provide their patients with the following 2 reminders -- prescription refills can take up to 72 hours, and a friendly reminder that in the future you can use your [facility] account to request refills: yes

## 2025-06-23 NOTE — TELEPHONE ENCOUNTER
Spoke with wife SOB no change/ no worse no bleeding in awhile, however just started with a nose bleed-- has clamp on it.  Will check cbc whne he gets blood /inr check Monday-  asked them to get cbc I am about to place.  If his SOB worsens, Will get him to come in for blood transfusion.

## 2025-06-30 ENCOUNTER — LAB VISIT (OUTPATIENT)
Dept: LAB | Facility: HOSPITAL | Age: 85
End: 2025-06-30
Attending: INTERNAL MEDICINE
Payer: MEDICARE

## 2025-06-30 ENCOUNTER — ANTI-COAG VISIT (OUTPATIENT)
Dept: CARDIOLOGY | Facility: CLINIC | Age: 85
End: 2025-06-30
Payer: MEDICARE

## 2025-06-30 DIAGNOSIS — Z95.2 H/O MECHANICAL AORTIC VALVE REPLACEMENT: ICD-10-CM

## 2025-06-30 DIAGNOSIS — N17.9 AKI (ACUTE KIDNEY INJURY): Primary | ICD-10-CM

## 2025-06-30 DIAGNOSIS — N18.4 TYPE 2 DIABETES MELLITUS WITH STAGE 4 CHRONIC KIDNEY DISEASE, WITHOUT LONG-TERM CURRENT USE OF INSULIN: Chronic | ICD-10-CM

## 2025-06-30 DIAGNOSIS — Z79.01 CHRONIC ANTICOAGULATION: Primary | ICD-10-CM

## 2025-06-30 DIAGNOSIS — E11.69 HYPERLIPIDEMIA ASSOCIATED WITH TYPE 2 DIABETES MELLITUS: ICD-10-CM

## 2025-06-30 DIAGNOSIS — E11.22 TYPE 2 DIABETES MELLITUS WITH STAGE 4 CHRONIC KIDNEY DISEASE, WITHOUT LONG-TERM CURRENT USE OF INSULIN: Chronic | ICD-10-CM

## 2025-06-30 DIAGNOSIS — E78.5 HYPERLIPIDEMIA ASSOCIATED WITH TYPE 2 DIABETES MELLITUS: ICD-10-CM

## 2025-06-30 DIAGNOSIS — Z79.01 CHRONIC ANTICOAGULATION: ICD-10-CM

## 2025-06-30 DIAGNOSIS — D64.9 ANEMIA, UNSPECIFIED TYPE: ICD-10-CM

## 2025-06-30 LAB
ABSOLUTE EOSINOPHIL (OHS): 0.1 K/UL
ABSOLUTE MONOCYTE (OHS): 0.48 K/UL (ref 0.3–1)
ABSOLUTE NEUTROPHIL COUNT (OHS): 2.14 K/UL (ref 1.8–7.7)
ALBUMIN SERPL BCP-MCNC: 3.5 G/DL (ref 3.5–5.2)
ALP SERPL-CCNC: 72 UNIT/L (ref 40–150)
ALT SERPL W/O P-5'-P-CCNC: 12 UNIT/L (ref 10–44)
ANION GAP (OHS): 8 MMOL/L (ref 8–16)
AST SERPL-CCNC: 15 UNIT/L (ref 11–45)
BASOPHILS # BLD AUTO: 0.04 K/UL
BASOPHILS NFR BLD AUTO: 1 %
BILIRUB SERPL-MCNC: 0.6 MG/DL (ref 0.1–1)
BUN SERPL-MCNC: 32 MG/DL (ref 8–23)
CALCIUM SERPL-MCNC: 10 MG/DL (ref 8.7–10.5)
CHLORIDE SERPL-SCNC: 109 MMOL/L (ref 95–110)
CHOLEST SERPL-MCNC: 98 MG/DL (ref 120–199)
CHOLEST/HDLC SERPL: 2.5 {RATIO} (ref 2–5)
CO2 SERPL-SCNC: 22 MMOL/L (ref 23–29)
CREAT SERPL-MCNC: 2.9 MG/DL (ref 0.5–1.4)
EAG (OHS): 111 MG/DL (ref 68–131)
ERYTHROCYTE [DISTWIDTH] IN BLOOD BY AUTOMATED COUNT: 14.8 % (ref 11.5–14.5)
GFR SERPLBLD CREATININE-BSD FMLA CKD-EPI: 21 ML/MIN/1.73/M2
GLUCOSE SERPL-MCNC: 82 MG/DL (ref 70–110)
HBA1C MFR BLD: 5.5 % (ref 4–5.6)
HCT VFR BLD AUTO: 25.4 % (ref 40–54)
HDLC SERPL-MCNC: 40 MG/DL (ref 40–75)
HDLC SERPL: 40.8 % (ref 20–50)
HGB BLD-MCNC: 7.7 GM/DL (ref 14–18)
IMM GRANULOCYTES # BLD AUTO: 0.01 K/UL (ref 0–0.04)
IMM GRANULOCYTES NFR BLD AUTO: 0.3 % (ref 0–0.5)
INR PPP: 2.7 (ref 0.8–1.2)
LDLC SERPL CALC-MCNC: 40 MG/DL (ref 63–159)
LYMPHOCYTES # BLD AUTO: 1.2 K/UL (ref 1–4.8)
MCH RBC QN AUTO: 30 PG (ref 27–31)
MCHC RBC AUTO-ENTMCNC: 30.3 G/DL (ref 32–36)
MCV RBC AUTO: 99 FL (ref 82–98)
NONHDLC SERPL-MCNC: 58 MG/DL
NUCLEATED RBC (/100WBC) (OHS): 0 /100 WBC
PLATELET # BLD AUTO: 170 K/UL (ref 150–450)
PMV BLD AUTO: 11.8 FL (ref 9.2–12.9)
POTASSIUM SERPL-SCNC: 4.8 MMOL/L (ref 3.5–5.1)
PROT SERPL-MCNC: 7.3 GM/DL (ref 6–8.4)
PROTHROMBIN TIME: 27.2 SECONDS (ref 9–12.5)
RBC # BLD AUTO: 2.57 M/UL (ref 4.6–6.2)
RELATIVE EOSINOPHIL (OHS): 2.5 %
RELATIVE LYMPHOCYTE (OHS): 30.2 % (ref 18–48)
RELATIVE MONOCYTE (OHS): 12.1 % (ref 4–15)
RELATIVE NEUTROPHIL (OHS): 53.9 % (ref 38–73)
SODIUM SERPL-SCNC: 139 MMOL/L (ref 136–145)
TRIGL SERPL-MCNC: 90 MG/DL (ref 30–150)
WBC # BLD AUTO: 3.97 K/UL (ref 3.9–12.7)

## 2025-06-30 PROCEDURE — 85610 PROTHROMBIN TIME: CPT | Mod: HCNC

## 2025-06-30 PROCEDURE — 80061 LIPID PANEL: CPT | Mod: HCNC

## 2025-06-30 PROCEDURE — 36415 COLL VENOUS BLD VENIPUNCTURE: CPT | Mod: HCNC

## 2025-06-30 PROCEDURE — 80053 COMPREHEN METABOLIC PANEL: CPT | Mod: HCNC

## 2025-06-30 PROCEDURE — 93793 ANTICOAG MGMT PT WARFARIN: CPT | Mod: S$GLB,,,

## 2025-06-30 PROCEDURE — 85025 COMPLETE CBC W/AUTO DIFF WBC: CPT | Mod: HCNC

## 2025-06-30 PROCEDURE — 83036 HEMOGLOBIN GLYCOSYLATED A1C: CPT | Mod: HCNC

## 2025-06-30 NOTE — PROGRESS NOTES
[facility]    2025 10:55 AM    Assessment/Plan:    Patient presents today with supratherapeutic INR.    Assessment of patient findings and chart review: Reviewed.  CBC reading similar to 2 weeks ago; however, scr has increased.     Recommendation for patient's warfarin regimen: Continue current maintenance dose + serving of greens     Recommend repeat INR in 1 week  _________________________________________________________________    Dariel Urbina (84 y.o.) is followed by the [facility].    Anticoagulation Summary  As of 2025      INR goal:  2.0-2.5   TTR:  65.7% (12.7 y)   INR used for dosin.7 (2025)   Warfarin maintenance plan:  2.5 mg (5 mg x 0.5) every Mon, Wed, Fri; 5 mg (5 mg x 1) all other days   Weekly warfarin total:  27.5 mg   Plan last modified:  Laurie Patino, PharmD (2025)   Next INR check:  --   Target end date:  --    Indications    Chronic anticoagulation [Z79.01]  H/O mechanical aortic valve replacement [Z95.2]                 Anticoagulation Episode Summary       INR check location:  Clinic Lab    Preferred lab:  --    Send INR reminders to:  [facility] COUMADIN MONITORING POOL    Comments:  [facility] IM - Internal Med Clinic only Mon - Thu // carpel tunnel release  - target low end of range          Anticoagulation Care Providers       Provider Role Specialty Phone number    Devon Langston Jr., MD [facility] Internal Medicine [phone]

## 2025-06-30 NOTE — PROGRESS NOTES
Worsening renal function on labs. Discussed with patient's wife, Ameena, who reports he has been feeling well, urinating well, no new complaints. Denies swelling. Okay to reduce bumex to 1mg qod for now. Repeat labs in 1 week. She verbalizes understanding.

## 2025-07-04 DIAGNOSIS — D50.9 IRON DEFICIENCY ANEMIA, UNSPECIFIED IRON DEFICIENCY ANEMIA TYPE: Primary | ICD-10-CM

## 2025-07-04 DIAGNOSIS — R04.0 RECURRENT EPISTAXIS: ICD-10-CM

## 2025-07-05 ENCOUNTER — PATIENT MESSAGE (OUTPATIENT)
Dept: GASTROENTEROLOGY | Facility: CLINIC | Age: 85
End: 2025-07-05
Payer: MEDICARE

## 2025-07-05 RX ORDER — FERROUS GLUCONATE 324(38)MG
TABLET ORAL
Qty: 90 TABLET | Refills: 3 | OUTPATIENT
Start: 2025-07-05

## 2025-07-07 ENCOUNTER — LAB VISIT (OUTPATIENT)
Dept: LAB | Facility: HOSPITAL | Age: 85
End: 2025-07-07
Attending: INTERNAL MEDICINE
Payer: MEDICARE

## 2025-07-07 ENCOUNTER — ANTI-COAG VISIT (OUTPATIENT)
Dept: CARDIOLOGY | Facility: CLINIC | Age: 85
End: 2025-07-07
Payer: MEDICARE

## 2025-07-07 DIAGNOSIS — R04.0 RECURRENT EPISTAXIS: ICD-10-CM

## 2025-07-07 DIAGNOSIS — Z95.2 H/O MECHANICAL AORTIC VALVE REPLACEMENT: ICD-10-CM

## 2025-07-07 DIAGNOSIS — Z79.01 CHRONIC ANTICOAGULATION: Primary | ICD-10-CM

## 2025-07-07 DIAGNOSIS — N17.9 AKI (ACUTE KIDNEY INJURY): ICD-10-CM

## 2025-07-07 DIAGNOSIS — D50.9 IRON DEFICIENCY ANEMIA, UNSPECIFIED IRON DEFICIENCY ANEMIA TYPE: ICD-10-CM

## 2025-07-07 DIAGNOSIS — Z79.01 CHRONIC ANTICOAGULATION: ICD-10-CM

## 2025-07-07 LAB
ANION GAP (OHS): 9 MMOL/L (ref 8–16)
BUN SERPL-MCNC: 29 MG/DL (ref 8–23)
CALCIUM SERPL-MCNC: 9.7 MG/DL (ref 8.7–10.5)
CHLORIDE SERPL-SCNC: 110 MMOL/L (ref 95–110)
CO2 SERPL-SCNC: 20 MMOL/L (ref 23–29)
CREAT SERPL-MCNC: 2.8 MG/DL (ref 0.5–1.4)
FERRITIN SERPL-MCNC: 35 NG/ML (ref 20–300)
GFR SERPLBLD CREATININE-BSD FMLA CKD-EPI: 22 ML/MIN/1.73/M2
GLUCOSE SERPL-MCNC: 118 MG/DL (ref 70–110)
IGA SERPL-MCNC: 206 MG/DL (ref 40–350)
INR PPP: 2.7 (ref 0.8–1.2)
IRON SATN MFR SERPL: 49 % (ref 20–50)
IRON SERPL-MCNC: 171 UG/DL (ref 45–160)
POTASSIUM SERPL-SCNC: 4.2 MMOL/L (ref 3.5–5.1)
PROTHROMBIN TIME: 27.7 SECONDS (ref 9–12.5)
SODIUM SERPL-SCNC: 139 MMOL/L (ref 136–145)
TIBC SERPL-MCNC: 352 UG/DL (ref 250–450)
TRANSFERRIN SERPL-MCNC: 238 MG/DL (ref 200–375)

## 2025-07-07 PROCEDURE — 82374 ASSAY BLOOD CARBON DIOXIDE: CPT | Mod: HCNC

## 2025-07-07 PROCEDURE — 83540 ASSAY OF IRON: CPT | Mod: HCNC

## 2025-07-07 PROCEDURE — 82784 ASSAY IGA/IGD/IGG/IGM EACH: CPT | Mod: HCNC

## 2025-07-07 PROCEDURE — 82728 ASSAY OF FERRITIN: CPT | Mod: HCNC

## 2025-07-07 PROCEDURE — 86364 TISS TRNSGLTMNASE EA IG CLAS: CPT | Mod: HCNC

## 2025-07-07 PROCEDURE — 93793 ANTICOAG MGMT PT WARFARIN: CPT | Mod: S$GLB,,,

## 2025-07-07 PROCEDURE — 36415 COLL VENOUS BLD VENIPUNCTURE: CPT | Mod: HCNC

## 2025-07-07 PROCEDURE — 85610 PROTHROMBIN TIME: CPT | Mod: HCNC

## 2025-07-07 RX ORDER — FERROUS GLUCONATE 324(38)MG
324 TABLET ORAL
Qty: 90 TABLET | Refills: 3 | Status: SHIPPED | OUTPATIENT
Start: 2025-07-07

## 2025-07-07 NOTE — PROGRESS NOTES
[facility]    2025 2:13 PM    Assessment/Plan:    Patient presents today with supratherapeutic INR.    Assessment of patient findings and chart review: Reviewed.  Pt did not have serving of greens as recommended.     Recommendation for patient's warfarin regimen: Decrease per calendar     Recommend repeat INR in 1 week  _________________________________________________________________    Dariel Devon Urbina (84 y.o.) is followed by the [facility].    Anticoagulation Summary  As of 2025      INR goal:  2.0-2.5   TTR:  65.6% (12.8 y)   INR used for dosin.7 (2025)   Warfarin maintenance plan:  2.5 mg (5 mg x 0.5) every Mon, Wed, Fri; 5 mg (5 mg x 1) all other days   Weekly warfarin total:  27.5 mg   Plan last modified:  Laurie Patino, PharmD (2025)   Next INR check:  2025   Target end date:  --    Indications    Chronic anticoagulation [Z79.01]  H/O mechanical aortic valve replacement [Z95.2]                 Anticoagulation Episode Summary       INR check location:  Clinic Lab    Preferred lab:  --    Send INR reminders to:  [facility] COUMADIN MONITORING POOL    Comments:  [facility] IM - Internal Med Clinic only Mon - Thu // carpel tunnel release  - target low end of range          Anticoagulation Care Providers       Provider Role Specialty Phone number    Devon Langston Jr., MD [facility] Internal Medicine [phone]

## 2025-07-10 LAB — W TISSUE TRANSGLUTAMINASE IGA AB: 0.3 U/ML

## 2025-07-14 ENCOUNTER — LAB VISIT (OUTPATIENT)
Dept: LAB | Facility: HOSPITAL | Age: 85
End: 2025-07-14
Attending: INTERNAL MEDICINE
Payer: MEDICARE

## 2025-07-14 ENCOUNTER — ANTI-COAG VISIT (OUTPATIENT)
Dept: CARDIOLOGY | Facility: CLINIC | Age: 85
End: 2025-07-14
Payer: MEDICARE

## 2025-07-14 DIAGNOSIS — Z79.01 CHRONIC ANTICOAGULATION: ICD-10-CM

## 2025-07-14 DIAGNOSIS — Z95.2 H/O MECHANICAL AORTIC VALVE REPLACEMENT: ICD-10-CM

## 2025-07-14 DIAGNOSIS — Z79.01 CHRONIC ANTICOAGULATION: Primary | ICD-10-CM

## 2025-07-14 LAB
INR PPP: 2.7 (ref 0.8–1.2)
PROTHROMBIN TIME: 27.5 SECONDS (ref 9–12.5)

## 2025-07-14 PROCEDURE — 36415 COLL VENOUS BLD VENIPUNCTURE: CPT | Mod: HCNC

## 2025-07-14 PROCEDURE — 93793 ANTICOAG MGMT PT WARFARIN: CPT | Mod: S$GLB,,,

## 2025-07-14 PROCEDURE — 85610 PROTHROMBIN TIME: CPT | Mod: HCNC

## 2025-07-14 NOTE — PROGRESS NOTES
[facility]    2025 11:28 AM    Assessment/Plan:    Patient presents today with subtherapeutic  INR.    Assessment of patient findings and chart review: Reviewed     Recommendation for patient's warfarin regimen: Decrease maintenance dose    Recommend repeat INR in 1 week  _________________________________________________________________    Dariel Devon Urbina (84 y.o.) is followed by the [facility].    Anticoagulation Summary  As of 2025      INR goal:  2.0-2.5   TTR:  65.5% (12.8 y)   INR used for dosin.7 (2025)   Warfarin maintenance plan:  5 mg (5 mg x 1) every Sun, e, Thu; 2.5 mg (5 mg x 0.5) all other days   Weekly warfarin total:  25 mg   Plan last modified:  Laurie Patino, PharmD (2025)   Next INR check:  2025   Target end date:  --    Indications    Chronic anticoagulation [Z79.01]  H/O mechanical aortic valve replacement [Z95.2]                 Anticoagulation Episode Summary       INR check location:  Clinic Lab    Preferred lab:  --    Send INR reminders to:  [facility] COUMADIN MONITORING POOL    Comments:  [facility] IM - Internal Med Clinic only Mon - u // carpel tunnel release  - target low end of range          Anticoagulation Care Providers       Provider Role Specialty Phone number    Devon Langston Jr., MD [facility] Internal Medicine [phone]

## 2025-07-21 ENCOUNTER — ANTI-COAG VISIT (OUTPATIENT)
Dept: CARDIOLOGY | Facility: CLINIC | Age: 85
End: 2025-07-21
Payer: MEDICARE

## 2025-07-21 ENCOUNTER — LAB VISIT (OUTPATIENT)
Dept: LAB | Facility: HOSPITAL | Age: 85
End: 2025-07-21
Attending: INTERNAL MEDICINE
Payer: MEDICARE

## 2025-07-21 DIAGNOSIS — Z95.2 H/O MECHANICAL AORTIC VALVE REPLACEMENT: ICD-10-CM

## 2025-07-21 DIAGNOSIS — Z79.01 CHRONIC ANTICOAGULATION: Primary | ICD-10-CM

## 2025-07-21 DIAGNOSIS — Z79.01 CHRONIC ANTICOAGULATION: ICD-10-CM

## 2025-07-21 LAB
INR PPP: 2.4 (ref 0.8–1.2)
PROTHROMBIN TIME: 24.5 SECONDS (ref 9–12.5)

## 2025-07-21 PROCEDURE — 85610 PROTHROMBIN TIME: CPT | Mod: HCNC

## 2025-07-21 PROCEDURE — 36415 COLL VENOUS BLD VENIPUNCTURE: CPT | Mod: HCNC

## 2025-07-21 PROCEDURE — 93793 ANTICOAG MGMT PT WARFARIN: CPT | Mod: S$GLB,,,

## 2025-07-21 NOTE — PROGRESS NOTES
[facility]    2025 9:47 AM    Assessment/Plan:    Patient presents today with therapeutic INR.    Assessment of patient findings and chart review: Reviewed    Recommendation for patient's warfarin regimen: Continue current maintenance dose    Recommend repeat INR in 2 weeks  _________________________________________________________________    Dariel Devon Urbina (84 y.o.) is followed by the [facility].    Anticoagulation Summary  As of 2025      INR goal:  2.0-2.5   TTR:  65.4% (12.8 y)   INR used for dosin.4 (2025)   Warfarin maintenance plan:  5 mg (5 mg x 1) every Sun, Tue, Thu; 2.5 mg (5 mg x 0.5) all other days   Weekly warfarin total:  25 mg   Plan last modified:  Laurie Patino, PharmD (2025)   Next INR check:  2025   Target end date:  --    Indications    Chronic anticoagulation [Z79.01]  H/O mechanical aortic valve replacement [Z95.2]                 Anticoagulation Episode Summary       INR check location:  Clinic Lab    Preferred lab:  --    Send INR reminders to:  [facility] COUMADIN MONITORING POOL    Comments:  [facility] IM - Internal Med Clinic only Mon - Thu // carpel tunnel release  - target low end of range          Anticoagulation Care Providers       Provider Role Specialty Phone number    Devon Langston Jr., MD [facility] Internal Medicine [phone]

## 2025-07-22 ENCOUNTER — TELEPHONE (OUTPATIENT)
Dept: ENDOSCOPY | Facility: HOSPITAL | Age: 85
End: 2025-07-22
Payer: MEDICARE

## 2025-07-22 VITALS — WEIGHT: 143.31 LBS | BODY MASS INDEX: 23.88 KG/M2 | HEIGHT: 65 IN

## 2025-07-22 DIAGNOSIS — D50.9 IRON DEFICIENCY ANEMIA, UNSPECIFIED IRON DEFICIENCY ANEMIA TYPE: Primary | ICD-10-CM

## 2025-07-22 NOTE — TELEPHONE ENCOUNTER
Patient is scheduled for a Upper Endoscopy (EGD) on 9/2/25 with Dr. JACQUELYN Pelletier  Referral for procedure from [facility]

## 2025-07-22 NOTE — TELEPHONE ENCOUNTER
Dear Provider,     Patient has a scheduled procedure Upper Endoscopy (EGD) on 9/2/25  and is currently taking a blood thinner. In order to ensure patient safety, we would like to confirm that the patient can place their blood thinner medication on hold for the procedure. Can he/she discontinue Coumadin/Jantoven (warfarin) for a minimum of  2 days prior to the procedure?     Thank you for your prompt reply.     [facility] Endoscopy Scheduling

## 2025-07-22 NOTE — TELEPHONE ENCOUNTER
----- Message from Med Assistant Cifuentes sent at 2025 11:14 AM CDT -----  Regardin/2 Bt Cl  The patient is currently under an internal PCP, dimple Roca. Is patient okay to hold blood thinner Coumadin/Jantoven (warfarin) for their upcoming scheduled Upper Endoscopy (EGD) on 25.         Notes: Patient sees coumadin clinic also

## 2025-07-22 NOTE — TELEPHONE ENCOUNTER
----- Message from Beti sent at 7/7/2025  8:38 AM CDT -----    ----- Message -----  From: Sergei Ayon MD  Sent: 7/5/2025   4:12 PM CDT  To: [facility] Patients    Procedure: EGD/Colonoscopy 1st MD available    Diagnosis: Iron deficiency anemia    Procedure Timing: Within 4 weeks (Urgent)    [facility]), congestive heart failure, pulmonary edema, severe anemia, renal insufficiency    Additional Scheduling Information: Blood thinners    Prep Specifications:Standard prep    Is the patient taking a GLP-1 Agonist:no but please ask    Have you attached a patient to this message: yes

## 2025-07-23 ENCOUNTER — PATIENT MESSAGE (OUTPATIENT)
Dept: INTERNAL MEDICINE | Facility: CLINIC | Age: 85
End: 2025-07-23
Payer: MEDICARE

## 2025-07-23 DIAGNOSIS — Z95.2 H/O MECHANICAL AORTIC VALVE REPLACEMENT: ICD-10-CM

## 2025-07-23 DIAGNOSIS — Z79.01 CHRONIC ANTICOAGULATION: ICD-10-CM

## 2025-07-24 RX ORDER — WARFARIN SODIUM 5 MG/1
TABLET ORAL
Qty: 30 TABLET | Refills: 11 | Status: SHIPPED | OUTPATIENT
Start: 2025-07-24

## 2025-07-24 NOTE — TELEPHONE ENCOUNTER
LOV-5/8/25-Devon Langston Jr., MD     Pts wife is requesting:Can you please order labs for Dariel? He goes to Lab on August 4th for INR . He has been bleeding off and on for about a week,a little bit every day. He goes to you o August 12th.     Last labs 6/30/25

## 2025-07-27 ENCOUNTER — HOSPITAL ENCOUNTER (OUTPATIENT)
Facility: HOSPITAL | Age: 85
Discharge: HOME | End: 2025-07-30
Attending: EMERGENCY MEDICINE
Payer: MEDICARE

## 2025-07-27 DIAGNOSIS — D64.9 SYMPTOMATIC ANEMIA: ICD-10-CM

## 2025-07-27 DIAGNOSIS — R07.9 CHEST PAIN: ICD-10-CM

## 2025-07-27 DIAGNOSIS — Z79.01 CHRONIC ANTICOAGULATION: ICD-10-CM

## 2025-07-27 DIAGNOSIS — R04.0 EPISTAXIS: Primary | ICD-10-CM

## 2025-07-27 DIAGNOSIS — Z95.2 H/O MECHANICAL AORTIC VALVE REPLACEMENT: ICD-10-CM

## 2025-07-27 LAB
ABO + RH BLD: NORMAL
ABSOLUTE EOSINOPHIL (OHS): 0.06 K/UL
ABSOLUTE MONOCYTE (OHS): 0.44 K/UL (ref 0.3–1)
ABSOLUTE NEUTROPHIL COUNT (OHS): 3.91 K/UL (ref 1.8–7.7)
ALBUMIN SERPL BCP-MCNC: 3.2 G/DL (ref 3.5–5.2)
ALP SERPL-CCNC: 92 UNIT/L (ref 40–150)
ALT SERPL W/O P-5'-P-CCNC: 41 UNIT/L (ref 0–55)
ANION GAP (OHS): 7 MMOL/L (ref 8–16)
AST SERPL-CCNC: 51 UNIT/L (ref 0–50)
BASOPHILS # BLD AUTO: 0.03 K/UL
BASOPHILS NFR BLD AUTO: 0.5 %
BILIRUB SERPL-MCNC: 0.5 MG/DL (ref 0.1–1)
BLD PROD TYP BPU: NORMAL
BLOOD UNIT EXPIRATION DATE: NORMAL
BLOOD UNIT TYPE CODE: 9500
BUN SERPL-MCNC: 47 MG/DL (ref 8–23)
CALCIUM SERPL-MCNC: 9.7 MG/DL (ref 8.7–10.5)
CHLORIDE SERPL-SCNC: 108 MMOL/L (ref 95–110)
CO2 SERPL-SCNC: 23 MMOL/L (ref 23–29)
CREAT SERPL-MCNC: 2.5 MG/DL (ref 0.5–1.4)
CROSSMATCH INTERPRETATION: NORMAL
DISPENSE STATUS: NORMAL
ERYTHROCYTE [DISTWIDTH] IN BLOOD BY AUTOMATED COUNT: 14.5 % (ref 11.5–14.5)
GFR SERPLBLD CREATININE-BSD FMLA CKD-EPI: 25 ML/MIN/1.73/M2
GLUCOSE SERPL-MCNC: 105 MG/DL (ref 70–110)
HCT VFR BLD AUTO: 22.5 % (ref 40–54)
HGB BLD-MCNC: 7.1 GM/DL (ref 14–18)
IMM GRANULOCYTES # BLD AUTO: 0.02 K/UL (ref 0–0.04)
IMM GRANULOCYTES NFR BLD AUTO: 0.4 % (ref 0–0.5)
INDIRECT COOMBS: NORMAL
INR PPP: 3.1 (ref 0.8–1.2)
LYMPHOCYTES # BLD AUTO: 1.03 K/UL (ref 1–4.8)
MCH RBC QN AUTO: 30.7 PG (ref 27–31)
MCHC RBC AUTO-ENTMCNC: 31.6 G/DL (ref 32–36)
MCV RBC AUTO: 97 FL (ref 82–98)
NUCLEATED RBC (/100WBC) (OHS): 0 /100 WBC
PLATELET # BLD AUTO: 174 K/UL (ref 150–450)
PMV BLD AUTO: 9.7 FL (ref 9.2–12.9)
POTASSIUM SERPL-SCNC: 4.4 MMOL/L (ref 3.5–5.1)
PROT SERPL-MCNC: 7.4 GM/DL (ref 6–8.4)
PROTHROMBIN TIME: 31.2 SECONDS (ref 9–12.5)
RBC # BLD AUTO: 2.31 M/UL (ref 4.6–6.2)
RELATIVE EOSINOPHIL (OHS): 1.1 %
RELATIVE LYMPHOCYTE (OHS): 18.8 % (ref 18–48)
RELATIVE MONOCYTE (OHS): 8 % (ref 4–15)
RELATIVE NEUTROPHIL (OHS): 71.2 % (ref 38–73)
RH BLD: NORMAL
SODIUM SERPL-SCNC: 138 MMOL/L (ref 136–145)
SPECIMEN OUTDATE: NORMAL
UNIT NUMBER: NORMAL
WBC # BLD AUTO: 5.49 K/UL (ref 3.9–12.7)

## 2025-07-27 PROCEDURE — 85025 COMPLETE CBC W/AUTO DIFF WBC: CPT | Mod: HCNC

## 2025-07-27 PROCEDURE — 25000003 PHARM REV CODE 250: Mod: HCNC

## 2025-07-27 PROCEDURE — 80053 COMPREHEN METABOLIC PANEL: CPT | Mod: HCNC

## 2025-07-27 PROCEDURE — 85610 PROTHROMBIN TIME: CPT | Mod: HCNC

## 2025-07-27 PROCEDURE — 86920 COMPATIBILITY TEST SPIN: CPT | Mod: HCNC

## 2025-07-27 PROCEDURE — G0378 HOSPITAL OBSERVATION PER HR: HCPCS | Mod: HCNC

## 2025-07-27 PROCEDURE — 86850 RBC ANTIBODY SCREEN: CPT | Mod: HCNC

## 2025-07-27 PROCEDURE — P9016 RBC LEUKOCYTES REDUCED: HCPCS | Mod: HCNC

## 2025-07-27 PROCEDURE — 36430 TRANSFUSION BLD/BLD COMPNT: CPT | Mod: HCNC

## 2025-07-27 PROCEDURE — 99285 EMERGENCY DEPT VISIT HI MDM: CPT | Mod: 25,HCNC

## 2025-07-27 RX ORDER — ONDANSETRON HYDROCHLORIDE 2 MG/ML
4 INJECTION, SOLUTION INTRAVENOUS EVERY 8 HOURS PRN
Status: DISCONTINUED | OUTPATIENT
Start: 2025-07-27 | End: 2025-07-30 | Stop reason: HOSPADM

## 2025-07-27 RX ORDER — NALOXONE HCL 0.4 MG/ML
0.02 VIAL (ML) INJECTION
Status: DISCONTINUED | OUTPATIENT
Start: 2025-07-27 | End: 2025-07-30 | Stop reason: HOSPADM

## 2025-07-27 RX ORDER — ACETAMINOPHEN 500 MG
1000 TABLET ORAL EVERY 8 HOURS PRN
Status: DISCONTINUED | OUTPATIENT
Start: 2025-07-27 | End: 2025-07-30 | Stop reason: HOSPADM

## 2025-07-27 RX ORDER — TRANEXAMIC ACID 1 G/10ML
1000 INJECTION, SOLUTION INTRAVENOUS
Status: COMPLETED | OUTPATIENT
Start: 2025-07-27 | End: 2025-07-27

## 2025-07-27 RX ORDER — ACETAMINOPHEN 325 MG/1
650 TABLET ORAL EVERY 4 HOURS PRN
Status: DISCONTINUED | OUTPATIENT
Start: 2025-07-27 | End: 2025-07-30 | Stop reason: HOSPADM

## 2025-07-27 RX ORDER — OXYCODONE HYDROCHLORIDE 5 MG/1
5 TABLET ORAL EVERY 6 HOURS PRN
Refills: 0 | Status: DISCONTINUED | OUTPATIENT
Start: 2025-07-27 | End: 2025-07-27

## 2025-07-27 RX ORDER — QUETIAPINE FUMARATE 25 MG/1
50 TABLET, FILM COATED ORAL NIGHTLY
Status: DISCONTINUED | OUTPATIENT
Start: 2025-07-27 | End: 2025-07-30 | Stop reason: HOSPADM

## 2025-07-27 RX ORDER — HYDROMORPHONE HYDROCHLORIDE 1 MG/ML
1 INJECTION, SOLUTION INTRAMUSCULAR; INTRAVENOUS; SUBCUTANEOUS EVERY 4 HOURS PRN
Refills: 0 | Status: DISCONTINUED | OUTPATIENT
Start: 2025-07-27 | End: 2025-07-30 | Stop reason: HOSPADM

## 2025-07-27 RX ORDER — GLUCAGON 1 MG
1 KIT INJECTION
Status: DISCONTINUED | OUTPATIENT
Start: 2025-07-27 | End: 2025-07-30 | Stop reason: HOSPADM

## 2025-07-27 RX ORDER — AMOXICILLIN AND CLAVULANATE POTASSIUM 250; 125 MG/1; MG/1
2 TABLET, FILM COATED ORAL EVERY 12 HOURS
Status: DISCONTINUED | OUTPATIENT
Start: 2025-07-27 | End: 2025-07-29

## 2025-07-27 RX ORDER — ATORVASTATIN CALCIUM 40 MG/1
40 TABLET, FILM COATED ORAL DAILY
Status: DISCONTINUED | OUTPATIENT
Start: 2025-07-28 | End: 2025-07-30 | Stop reason: HOSPADM

## 2025-07-27 RX ORDER — IBUPROFEN 200 MG
24 TABLET ORAL
Status: DISCONTINUED | OUTPATIENT
Start: 2025-07-27 | End: 2025-07-30 | Stop reason: HOSPADM

## 2025-07-27 RX ORDER — IBUPROFEN 200 MG
16 TABLET ORAL
Status: DISCONTINUED | OUTPATIENT
Start: 2025-07-27 | End: 2025-07-30 | Stop reason: HOSPADM

## 2025-07-27 RX ORDER — FERROUS GLUCONATE 324(37.5)
324 TABLET ORAL
Status: DISCONTINUED | OUTPATIENT
Start: 2025-07-28 | End: 2025-07-30 | Stop reason: HOSPADM

## 2025-07-27 RX ORDER — ALLOPURINOL 100 MG/1
100 TABLET ORAL DAILY
Status: DISCONTINUED | OUTPATIENT
Start: 2025-07-28 | End: 2025-07-30 | Stop reason: HOSPADM

## 2025-07-27 RX ORDER — HYDROCODONE BITARTRATE AND ACETAMINOPHEN 500; 5 MG/1; MG/1
TABLET ORAL
Status: DISCONTINUED | OUTPATIENT
Start: 2025-07-27 | End: 2025-07-30 | Stop reason: HOSPADM

## 2025-07-27 RX ORDER — SODIUM CHLORIDE 0.9 % (FLUSH) 0.9 %
10 SYRINGE (ML) INJECTION EVERY 12 HOURS PRN
Status: DISCONTINUED | OUTPATIENT
Start: 2025-07-27 | End: 2025-07-30 | Stop reason: HOSPADM

## 2025-07-27 RX ORDER — ISOSORBIDE MONONITRATE 60 MG/1
60 TABLET, EXTENDED RELEASE ORAL NIGHTLY
Status: DISCONTINUED | OUTPATIENT
Start: 2025-07-27 | End: 2025-07-27

## 2025-07-27 RX ORDER — ISOSORBIDE MONONITRATE 60 MG/1
60 TABLET, EXTENDED RELEASE ORAL NIGHTLY
Status: DISCONTINUED | OUTPATIENT
Start: 2025-07-27 | End: 2025-07-30 | Stop reason: HOSPADM

## 2025-07-27 RX ADMIN — QUETIAPINE FUMARATE 50 MG: 25 TABLET ORAL at 08:07

## 2025-07-27 RX ADMIN — ACETAMINOPHEN 650 MG: 325 TABLET ORAL at 10:07

## 2025-07-27 RX ADMIN — ISOSORBIDE MONONITRATE 60 MG: 60 TABLET, EXTENDED RELEASE ORAL at 05:07

## 2025-07-27 RX ADMIN — TRANEXAMIC ACID 1000 MG: 100 INJECTION, SOLUTION INTRAVENOUS at 03:07

## 2025-07-27 RX ADMIN — TRANEXAMIC ACID 1000 MG: 100 INJECTION, SOLUTION INTRAVENOUS at 06:07

## 2025-07-27 RX ADMIN — ACETAMINOPHEN 1000 MG: 500 TABLET ORAL at 05:07

## 2025-07-27 NOTE — H&P
[facility] - Emergency Dept  [facility] Medicine  History & Physical    Patient Name: Dariel Urbina  MRN: [id number]  Patient Class: OP- Observation  Admission Date: 7/27/2025  Attending Physician: Zaid Faust MD   Primary Care Provider: Devon Langston Jr., MD         Patient information was obtained from patient, past medical records, and ER records.     Subjective:     Principal Problem:Epistaxis    Chief Complaint:   Chief Complaint   Patient presents with    Epistaxis     Started 0730 this morning, extensive history of this        HPI: Mr. Urbina is an 85 yo M with CAD s/p CABG in 1990's, mechanical AVR (on warfarin c/b long standing epistaxis) c/b mod-severe MR gayatri/ mitraclip, afib, GIB 2/2 diverticulosis s/p partial colon resection, T2DM (diet controlled), gout, HLD, HTN, CKD4 (Cr 2.3-2.5) presenting with an episode of epistaxis that started today at 7am. He felt weak, but no HA, vision changes, SOB, no new chest pain, no other overt bleeding nor stool changes, no LE edema.     In the ED he is afebrile and hemodynamically stable on RA with anemia to 7.1, Cr 2.5 (at baseline), INR 3.1. 1 u pRBC ordered, TXA ordered, and ENT consulted for nasal packing and recommendations for epistaxis management. Admitted to Hospital Medicine for evaluation of epistaxis in patient with mechanical AVR.     Past Medical History:   Diagnosis Date    Anemia of chronic renal failure, stage 3 (moderate) 05/27/2015    Anticoagulant long-term use     Atherosclerosis of coronary artery bypass graft of native heart without angina pectoris 09/11/2012    3-27-18 [facility] Two vessel coronary artery disease.   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    Bilateral carotid artery disease 02/09/2017    Bleeding from the nose     Bleeding nose 03/21/2018    Cancer     Cataract     CHF (congestive heart failure)     CKD (chronic kidney disease) stage 3, GFR 30-59 ml/min 05/27/2015    Claudication of left lower extremity  09/17/2014    Colon polyp     Encounter for blood transfusion     Gastroesophageal reflux disease without esophagitis 03/19/2018    Gastrointestinal hemorrhage associated with intestinal diverticulosis 04/01/2018    Glaucoma     H/O mechanical aortic valve replacement 09/17/2014    History of gout 09/26/2012    Hyperparathyroidism due to renal insufficiency 07/27/2015    Internal hemorrhoid 04/03/2018    Long term current use of anticoagulant therapy 09/26/2012    Mechanical heart valve present     Metabolic acidosis with normal anion gap and bicarbonate losses 03/20/2018    Mixed hyperlipidemia 09/26/2012    NSTEMI (non-ST elevated myocardial infarction) 03/21/2018    Obesity, diabetes, and hypertension syndrome 02/23/2016    Paroxysmal atrial fibrillation 02/06/2023    PVD (peripheral vascular disease) 09/11/2012    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       Past Surgical History:   Procedure Laterality Date    BACK SURGERY      CARDIAC CATHETERIZATION      CARDIAC VALVE REPLACEMENT      CARDIAC VALVE SURGERY      CARPAL TUNNEL RELEASE Right 05/19/2020    Procedure: RELEASE, CARPAL TUNNEL;  Surgeon: Rupesh Norris Jr., MD;  Location: [facility];  Service: Plastics;  Laterality: Right;    CATARACT EXTRACTION Left 11/13/2022        COLON SURGERY      COLONOSCOPY N/A 03/31/2017    Procedure: COLONOSCOPY;  Surgeon: Bruno Raymond MD;  Location: [facility] (4TH FLR);  Service: Endoscopy;  Laterality: N/A;  Patient's wife requesting date.    COLONOSCOPY N/A 04/03/2018    Procedure: COLONOSCOPY;  Surgeon: Bonifacio Pelletier MD;  Location: [facility] ENDO (2ND FLR);  Service: Endoscopy;   Laterality: N/A;    COLONOSCOPY N/A 08/13/2018    Procedure: COLONOSCOPY;  Surgeon: Kam Barba MD;  Location: [facility] ENDO (2ND FLR);  Service: Endoscopy;  Laterality: N/A;  2nd floor: PA pressure 49; hx of moderate-severe valve disease     per Coumadin clinic-Patient can hold 5 days with lovenox bridge       ok to schedule per Katarina    CONTROL OF EPISTAXIS, POSTERIOR, USING NASAL PACKING OR CAUTERIZATION Bilateral 6/18/2024    Procedure: CONTROL OF EPISTAXIS, POSTERIOR, USING NASAL PACKING OR CAUTERIZATION;  Surgeon: Jono Russell MD;  [facility];  Service: ENT;  Laterality: Bilateral;    CONTROL OF EPISTAXIS, POSTERIOR, USING NASAL PACKING OR CAUTERIZATION Bilateral 8/8/2024    Procedure: CONTROL OF EPISTAXIS, POSTERIOR, USING NASAL PACKING OR CAUTERIZATION;  Surgeon: Jono Russell MD;  [facility];  Service: ENT;  Laterality: Bilateral;  suction bovie, nasopore, endoscopes    CONTROL OF EPISTAXIS, POSTERIOR, USING NASAL PACKING OR CAUTERIZATION Bilateral 12/30/2024    Procedure: CONTROL OF EPISTAXIS, POSTERIOR, USING NASAL PACKING OR CAUTERIZATION;  Surgeon: Jono Russell MD;  [facility];  Service: ENT;  Laterality: Bilateral;    CORONARY ANGIOGRAPHY N/A 10/04/2021    Procedure: Left heart cath +/- peripheral angiogram;  Surgeon: Jose Ruiz MD;  Location: [facility] CATH LAB;  Service: Cardiology;  Laterality: N/A;    CORONARY ANGIOGRAPHY N/A 3/2/2023    Procedure: Angiogram, Coronary;  Surgeon: Devon Garnica MD;  Location: [facility] CATH LAB;  Service: Cardiology;  Laterality: N/A;    CORONARY ANGIOPLASTY      CORONARY ARTERY BYPASS GRAFT      CORONARY BYPASS GRAFT ANGIOGRAPHY  10/04/2021    Procedure: Bypass graft study;  Surgeon: Jose Ruiz MD;  Location: [facility] CATH LAB;  Service: Cardiology;;    CORONARY BYPASS GRAFT ANGIOGRAPHY  3/2/2023    Procedure: Bypass graft study;  Surgeon: Devon Garnica MD;  Location: [facility] CATH LAB;  Service: Cardiology;;     ECHOCARDIOGRAM,TRANSESOPHAGEAL N/A 4/14/2023    Procedure: Transesophageal echo (KIRSTEN) intra-procedure log documentation;  Surgeon: [facility] Diagnostic Provider;  Location: [facility] EP LAB;  Service: Cardiology;  Laterality: N/A;    ENDOSCOPIC NASAL CAUTERIZATION Bilateral 11/3/2023    Procedure: CAUTERIZATION, NOSE, ENDOSCOPIC;  Surgeon: Jono Russell MD;  Location: [facility] OR 2ND FLR;  Service: ENT;  Laterality: Bilateral;    ENDOSCOPIC NASAL CAUTERIZATION N/A 12/18/2023    Procedure: CAUTERIZATION, NOSE, ENDOSCOPIC;  Surgeon: Jono Russell MD;  Location: [facility] OR 2ND FLR;  Service: ENT;  Laterality: N/A;    ENDOSCOPIC NASAL CAUTERIZATION N/A 2/26/2025    Procedure: CAUTERIZATION, NOSE, ENDOSCOPIC;  Surgeon: Jono Russell MD;  Location: [facility] OR 2ND FLR;  Service: ENT;  Laterality: N/A;    ENDOSCOPIC NASAL CAUTERIZATION Bilateral 3/6/2025    Procedure: CAUTERIZATION, NOSE, ENDOSCOPIC;  Surgeon: Jono Russell MD;  Location: [facility] OR 2ND FLR;  Service: ENT;  Laterality: Bilateral;  If he has an outpatient case request for same day, use the inpatient one instead.    EYE SURGERY      FESS, WITH IMAGING GUIDANCE Left 2/28/2024    Procedure: FESS, WITH IMAGING GUIDANCE;  Surgeon: Jono Russell MD;  Location: [facility] OR 2ND FLR;  Service: ENT;  Laterality: Left;  Scan loaded ENT [employer]. CL    FLUOROSCOPY Bilateral 10/29/2024    Procedure: Fluoroscopy;  Surgeon: Sameer Espitia MD;  Location: [facility] CATH LAB/EP;  Service: Cardiology;  Laterality: Bilateral;  fluoroscopy of the mechanical aortic valve    FUNCTIONAL ENDOSCOPIC SINUS SURGERY (FESS) Bilateral 6/14/2023    Procedure: FESS (FUNCTIONAL ENDOSCOPIC SINUS SURGERY);  Surgeon: Jono Russell MD;  Location: [facility] OR 2ND FLR;  Service: ENT;  Laterality: Bilateral;    HERNIA REPAIR      INTRAOCULAR PROSTHESES INSERTION Left 11/13/2022    Procedure: INSERTION, IOL PROSTHESIS;  Surgeon: Alia Mckeon MD;  Location: [facility] OR 1ST FLR;  Service: Ophthalmology;   Laterality: Left;    INTRAOCULAR PROSTHESES INSERTION Right 12/04/2022    Procedure: INSERTION, IOL PROSTHESIS;  Surgeon: Alia Mckeon MD;  Location: [facility] OR 1ST FLR;  Service: Ophthalmology;  Laterality: Right;    LIGATION, ARTERY, ETHMOIDAL Left 2/28/2024    Procedure: LIGATION, ARTERY, ETHMOIDAL;  Surgeon: Jono Russell MD;  Location: [facility] OR 2ND FLR;  Service: ENT;  Laterality: Left;    LIGATION, ARTERY, SPHENOPALATINE, ENDOSCOPIC Bilateral 6/14/2023    Procedure: LIGATION, ARTERY, SPHENOPALATINE, ENDOSCOPIC;  Surgeon: Jono Russell MD;  Location: [facility] OR 2ND FLR;  Service: ENT;  Laterality: Bilateral;    NASAL ENDOSCOPY N/A 8/8/2024    Procedure: ENDOSCOPY, NOSE;  Surgeon: Jono Russell MD;  [facility];  Service: ENT;  Laterality: N/A;    PHACOEMULSIFICATION OF CATARACT Left 11/13/2022    Procedure: PHACOEMULSIFICATION, CATARACT;  Surgeon: Alia Mckeon MD;  [facility];  Service: Ophthalmology;  Laterality: Left;    PHACOEMULSIFICATION OF CATARACT Right 12/04/2022    Procedure: PHACOEMULSIFICATION, CATARACT;  Surgeon: Alia Mckeon MD;  [facility];  Service: Ophthalmology;  Laterality: Right;    SPINE SURGERY      TRANSESOPHAGEAL ECHOCARDIOGRAPHY N/A 3/22/2023    Procedure: ECHOCARDIOGRAM, TRANSESOPHAGEAL;  Surgeon: [facility] Diagnostic Provider;  Location: [facility] EP LAB;  Service: Anesthesiology;  Laterality: N/A;    TRANSESOPHAGEAL ECHOCARDIOGRAPHY N/A 4/11/2023    Procedure: ECHOCARDIOGRAM, TRANSESOPHAGEAL;  Surgeon: [facility] Diagnostic Provider;  Location: [facility] EP LAB;  Service: Anesthesiology;  Laterality: N/A;    VASECTOMY         Review of patient's allergies indicates:   Allergen Reactions    Lisinopril     Losartan      Intolerance- elevates potassium level       No current facility-administered medications on file prior to encounter.     Current Outpatient Medications on File Prior to Encounter   Medication Sig    acetaminophen (TYLENOL) 500 MG  tablet Take 500 mg by mouth daily as needed for Pain.    albuterol (VENTOLIN HFA) 90 mcg/actuation inhaler Inhale 2 puffs into the lungs every 6 (six) hours as needed for Wheezing. Rescue    allopurinoL (ZYLOPRIM) 100 MG tablet Take 1 tablet (100 mg total) by mouth once daily.    bumetanide (BUMEX) 1 MG tablet TAKE 2 TABLETS EVERY OTHER DAY    ferrous gluconate (FERGON) 324 MG tablet Take 1 tablet (324 mg total) by mouth daily with breakfast.    isosorbide mononitrate (IMDUR) 60 MG 24 hr tablet Take 1 tablet (60 mg total) by mouth every evening.    ketoconazole (NIZORAL) 2 % cream Apply topically 2 (two) times daily. For dry skin around nose and ears    omega-3 fatty acids/fish oil (FISH OIL-OMEGA-3 FATTY ACIDS) 300-1,000 mg capsule Take 1 capsule by mouth once daily.    oxymetazoline (AFRIN) 0.05 % nasal spray 2 sprays by Nasal route as needed (nose bleeds).    QUEtiapine (SEROQUEL) 50 MG tablet Take 1 tablet (50 mg total) by mouth every evening.    rosuvastatin (CRESTOR) 40 MG Tab Take 1 tablet (40 mg total) by mouth every evening.    sodium bicarbonate 650 MG tablet Take 1 tablet (650 mg total) by mouth once daily. (Patient not taking: Reported on 6/10/2025)    sodium chloride (SALINE NASAL) 0.65 % nasal spray Use 2 sprays by Nasal route every 4 hours. Apply into nasal cavities daily with nightly application of vaseline/aquaphor to anterior nares. Keep head of bed elevated.    warfarin (COUMADIN) 5 MG tablet TAKE 2.5MG MONDAY, WEDNESDAY, FRIDAY AND THEN TAKE 5MG ALL OTHER DAYS OR AS INSTRUCTED BY COUMADIN CLINIC     Family History       Problem Relation (Age of Onset)    Alcohol abuse Father    Diabetes Brother    Heart disease Mother, Father, Brother, Sister    Heart failure Mother, Father, Brother    No Known Problems Sister, Maternal Grandmother, Maternal Grandfather, Paternal Grandmother, Paternal Grandfather, Maternal Aunt, Maternal Uncle, Paternal Aunt, Paternal Uncle          Tobacco Use    Smoking status:  Former     Current packs/day: 0.00     Average packs/day: 1 pack/day for 20.0 years (20.0 ttl pk-yrs)     Types: Cigarettes     Start date: 1960     Quit date: 1980     Years since quittin.9     Passive exposure: Never    Smokeless tobacco: Never   Vaping Use    Vaping status: Never Used   Substance and Sexual Activity    Alcohol use: No    Drug use: No    Sexual activity: Not Currently     Partners: Female     Review of Systems   Constitutional:  Negative for activity change, appetite change and fever.   HENT:  Positive for nosebleeds.    Respiratory:  Negative for cough, shortness of breath and wheezing.    Cardiovascular:  Negative for chest pain and leg swelling.   Gastrointestinal:  Negative for abdominal pain, constipation, diarrhea, nausea and vomiting.   Skin:  Negative for rash.   Neurological:  Negative for headaches.     Objective:     Vital Signs (Most Recent):  Temp: 97.5 °F (36.4 °C) (25 1327)  Pulse: 66 (25 1600)  Resp: 20 (25 1500)  BP: (!) 144/65 (25 1327)  SpO2: 100 % (25 1600) Vital Signs (24h Range):  Temp:  [97.5 °F (36.4 °C)] 97.5 °F (36.4 °C)  Pulse:  [65-67] 66  Resp:  [20] 20  SpO2:  [100 %] 100 %  BP: (144)/(65) 144/65     Weight: 65 kg (143 lb 4.8 oz)  Body mass index is 23.85 kg/m².     Physical Exam  Vitals and nursing note reviewed.   Constitutional:       Appearance: He is ill-appearing (chronically).   HENT:      Head: Normocephalic and atraumatic.      Nose:      Comments: Dried blood in nares, nasal packing in place     Mouth/Throat:      Comments: Dried clot, suctioned out  Eyes:      Conjunctiva/sclera: Conjunctivae normal.   Cardiovascular:      Rate and Rhythm: Normal rate and regular rhythm.      Pulses: Normal pulses.   Pulmonary:      Effort: Pulmonary effort is normal. No respiratory distress.      Breath sounds: No wheezing or rales.   Abdominal:      Palpations: Abdomen is soft.      Tenderness: There is no abdominal tenderness.  There is no guarding.   Musculoskeletal:      Cervical back: Normal range of motion.      Right lower leg: No edema.      Left lower leg: No edema.   Skin:     General: Skin is warm and dry.   Neurological:      General: No focal deficit present.      Mental Status: He is alert and oriented to person, place, and time.   Psychiatric:         Mood and Affect: Mood normal.                Significant Labs: All pertinent labs within the past 24 hours have been reviewed.    Significant Imaging: I have reviewed all pertinent imaging results/findings within the past 24 hours.  Assessment/Plan:     Assessment & Plan  Epistaxis  Chronic anticoagulation  H/O mechanical aortic valve replacement  - appreciated ENT evaluation in the ED, follow up recs  - s/p TXA and 1 u pRBC ordered in ED  - given INR 3 on admission, hold coumadin tonight, check INR in AM    Acute blood loss anemia  Recurrent epistaxis  Anemia of chronic renal failure    Anemia is likely due to acute blood loss which was from epistaxis and chronic disease due to Chronic Kidney Disease. Most recent hemoglobin and hematocrit are listed below.  Recent Labs     07/27/25  1512   HGB 7.1*   HCT 22.5*     Plan  - Monitor serial CBC: Daily  - Transfuse PRBC if patient becomes hemodynamically unstable, symptomatic or H/H drops below 7/21.  - Patient has received 1 units of PRBCs on 07/27  - Patient's anemia is currently stable      Hyperlipidemia associated with type 2 diabetes mellitus  - transitioned to formulary statin    Type 2 diabetes mellitus with stage 4 chronic kidney disease, without long-term current use of insulin  Creatine stable for now. BMP reviewed- noted Estimated Creatinine Clearance: 19.5 mL/min (A) (based on SCr of 2.5 mg/dL (H)). according to latest data. 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  Monitor UOP and serial BMP and adjust therapy as needed. Renally dose meds. Avoid nephrotoxic medications and procedures.  Hypertension  associated with diabetes  - Resume home Imdur    Coronary artery disease involving native coronary artery of native heart without angina pectoris  Patient with known CAD s/p CABG, which is controlled Will continue Statin and monitor for S/Sx of angina/ACS. Continue to monitor on telemetry.   Paroxysmal atrial fibrillation  Patient has long standing persistent (>12 months) atrial fibrillation. Patient is currently in sinus rhythm. [id number] Score: 4. The patients heart rate in the last 24 hours is as follows:  Pulse  Min: 65  Max: 67     Antiarrhythmics       Anticoagulants       Plan  - Replete lytes with a goal of K>4, Mg >2  - Patient is anticoagulated, see medications listed above.  - Patient's afib is currently controlled        VTE Risk Mitigation (From admission, onward)      None               On 07/27/2025, patient should be placed in hospital observation services under my care.             Zaid Faust MD  Department of Hospital Medicine  [facility] - Emergency Dept

## 2025-07-27 NOTE — ASSESSMENT & PLAN NOTE
Patient has long standing persistent (>12 months) atrial fibrillation. Patient is currently in sinus rhythm. [id number] Score: 4. The patients heart rate in the last 24 hours is as follows:  Pulse  Min: 65  Max: 67     Antiarrhythmics       Anticoagulants       Plan  - Replete lytes with a goal of K>4, Mg >2  - Patient is anticoagulated, see medications listed above.  - Patient's afib is currently controlled      {Tip - No need to delete tips; these do not file into the chart.    Make sure to refresh the medications so your note is up to date!    :[id number]}

## 2025-07-27 NOTE — SUBJECTIVE & OBJECTIVE
Past Medical History:   Diagnosis Date    Anemia of chronic renal failure, stage 3 (moderate) 05/27/2015    Anticoagulant long-term use     Atherosclerosis of coronary artery bypass graft of native heart without angina pectoris 09/11/2012    3-27-18 [facility] Two vessel coronary artery disease.   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    Bilateral carotid artery disease 02/09/2017    Bleeding from the nose     Bleeding nose 03/21/2018    Cancer     Cataract     CHF (congestive heart failure)     CKD (chronic kidney disease) stage 3, GFR 30-59 ml/min 05/27/2015    Claudication of left lower extremity 09/17/2014    Colon polyp     Encounter for blood transfusion     Gastroesophageal reflux disease without esophagitis 03/19/2018    Gastrointestinal hemorrhage associated with intestinal diverticulosis 04/01/2018    Glaucoma     H/O mechanical aortic valve replacement 09/17/2014    History of gout 09/26/2012    Hyperparathyroidism due to renal insufficiency 07/27/2015    Internal hemorrhoid 04/03/2018    Long term current use of anticoagulant therapy 09/26/2012    Mechanical heart valve present     Metabolic acidosis with normal anion gap and bicarbonate losses 03/20/2018    Mixed hyperlipidemia 09/26/2012    NSTEMI (non-ST elevated myocardial infarction) 03/21/2018    Obesity, diabetes, and hypertension syndrome 02/23/2016    Paroxysmal atrial fibrillation 02/06/2023    PVD (peripheral vascular disease) 09/11/2012    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       Past Surgical History:   Procedure Laterality Date    BACK SURGERY      CARDIAC CATHETERIZATION      CARDIAC VALVE  REPLACEMENT      CARDIAC VALVE SURGERY      CARPAL TUNNEL RELEASE Right 05/19/2020    Procedure: RELEASE, CARPAL TUNNEL;  Surgeon: Rupesh Norris Jr., MD;  Location: [facility];  Service: Plastics;  Laterality: Right;    CATARACT EXTRACTION Left 11/13/2022        COLON SURGERY      COLONOSCOPY N/A 03/31/2017    Procedure: COLONOSCOPY;  Surgeon: Bruno Raymond MD;  Location: [facility] ENDO (4TH FLR);  Service: Endoscopy;  Laterality: N/A;  Patient's wife requesting date.    COLONOSCOPY N/A 04/03/2018    Procedure: COLONOSCOPY;  Surgeon: Bonifacio Pelletier MD;  Location: [facility] ENDO (2ND FLR);  Service: Endoscopy;  Laterality: N/A;    COLONOSCOPY N/A 08/13/2018    Procedure: COLONOSCOPY;  Surgeon: Kam Barba MD;  Location: [facility] ENDO (2ND FLR);  Service: Endoscopy;  Laterality: N/A;  2nd floor: PA pressure 49; hx of moderate-severe valve disease     per Coumadin clinic-Patient can hold 5 days with lovenox bridge       ok to schedule per Katarina    CONTROL OF EPISTAXIS, POSTERIOR, USING NASAL PACKING OR CAUTERIZATION Bilateral 6/18/2024    Procedure: CONTROL OF EPISTAXIS, POSTERIOR, USING NASAL PACKING OR CAUTERIZATION;  Surgeon: Jono Russell MD;  Location: [address];  Service: ENT;  Laterality: Bilateral;    CONTROL OF EPISTAXIS, POSTERIOR, USING NASAL PACKING OR CAUTERIZATION Bilateral 8/8/2024    Procedure: CONTROL OF EPISTAXIS, POSTERIOR, USING NASAL PACKING OR CAUTERIZATION;  Surgeon: Jono Russell MD;  Location: [address];  Service: ENT;  Laterality: Bilateral;  suction bovie, nasopore, endoscopes    CONTROL OF EPISTAXIS, POSTERIOR, USING NASAL PACKING OR CAUTERIZATION Bilateral 12/30/2024    Procedure: CONTROL OF EPISTAXIS, POSTERIOR, USING NASAL PACKING OR CAUTERIZATION;  Surgeon: Jono Russell MD;  Location: [address];  Service: ENT;  Laterality: Bilateral;    CORONARY ANGIOGRAPHY N/A 10/04/2021    Procedure: Left heart cath +/- peripheral angiogram;  Surgeon: Jose  MD Sara;  Location: [facility] CATH LAB;  Service: Cardiology;  Laterality: N/A;    CORONARY ANGIOGRAPHY N/A 3/2/2023    Procedure: Angiogram, Coronary;  Surgeon: Devon Garnica MD;  Location: [facility] CATH LAB;  Service: Cardiology;  Laterality: N/A;    CORONARY ANGIOPLASTY      CORONARY ARTERY BYPASS GRAFT      CORONARY BYPASS GRAFT ANGIOGRAPHY  10/04/2021    Procedure: Bypass graft study;  Surgeon: Jose Ruiz MD;  Location: [facility] CATH LAB;  Service: Cardiology;;    CORONARY BYPASS GRAFT ANGIOGRAPHY  3/2/2023    Procedure: Bypass graft study;  Surgeon: Devon Garnica MD;  Location: [facility] CATH LAB;  Service: Cardiology;;    ECHOCARDIOGRAM,TRANSESOPHAGEAL N/A 4/14/2023    Procedure: Transesophageal echo (KIRSTEN) intra-procedure log documentation;  Surgeon: [facility] Diagnostic Provider;  Location: [facility] EP LAB;  Service: Cardiology;  Laterality: N/A;    ENDOSCOPIC NASAL CAUTERIZATION Bilateral 11/3/2023    Procedure: CAUTERIZATION, NOSE, ENDOSCOPIC;  Surgeon: Jono Russell MD;  [facility];  Service: ENT;  Laterality: Bilateral;    ENDOSCOPIC NASAL CAUTERIZATION N/A 12/18/2023    Procedure: CAUTERIZATION, NOSE, ENDOSCOPIC;  Surgeon: Jono Russell MD;  Location: [facility] OR 2ND FLR;  Service: ENT;  Laterality: N/A;    ENDOSCOPIC NASAL CAUTERIZATION N/A 2/26/2025    Procedure: CAUTERIZATION, NOSE, ENDOSCOPIC;  Surgeon: Jono Russell MD;  Location: [facility] OR [facility] FLR;  Service: ENT;  Laterality: N/A;    ENDOSCOPIC NASAL CAUTERIZATION Bilateral 3/6/2025    Procedure: CAUTERIZATION, NOSE, ENDOSCOPIC;  Surgeon: Jono Russell MD;  Location: [facility] OR [facility] FLR;  Service: ENT;  Laterality: Bilateral;  If he has an outpatient case request for same day, use the inpatient one instead.    EYE SURGERY      FESS, WITH IMAGING GUIDANCE Left 2/28/2024    Procedure: FESS, WITH IMAGING GUIDANCE;  Surgeon: Jono Russell MD;  Location: [facility] OR 2ND FLR;  Service: ENT;  Laterality: Left;  Scan loaded ENT [employer]. CL     FLUOROSCOPY Bilateral 10/29/2024    Procedure: Fluoroscopy;  Surgeon: Sameer Espitia MD;  Location: [facility] CATH LAB/EP;  Service: Cardiology;  Laterality: Bilateral;  fluoroscopy of the mechanical aortic valve    FUNCTIONAL ENDOSCOPIC SINUS SURGERY (FESS) Bilateral 6/14/2023    Procedure: FESS (FUNCTIONAL ENDOSCOPIC SINUS SURGERY);  Surgeon: Jono Russell MD;  Location: [facility] OR 2ND FLR;  Service: ENT;  Laterality: Bilateral;    HERNIA REPAIR      INTRAOCULAR PROSTHESES INSERTION Left 11/13/2022    Procedure: INSERTION, IOL PROSTHESIS;  Surgeon: Alia Mckeon MD;  Location: [facility] OR 1ST FLR;  Service: Ophthalmology;  Laterality: Left;    INTRAOCULAR PROSTHESES INSERTION Right 12/04/2022    Procedure: INSERTION, IOL PROSTHESIS;  Surgeon: Alia Mckeon MD;  Location: [facility] OR 1ST FLR;  Service: Ophthalmology;  Laterality: Right;    LIGATION, ARTERY, ETHMOIDAL Left 2/28/2024    Procedure: LIGATION, ARTERY, ETHMOIDAL;  Surgeon: Jono Russell MD;  Location: [facility] OR 2ND FLR;  Service: ENT;  Laterality: Left;    LIGATION, ARTERY, SPHENOPALATINE, ENDOSCOPIC Bilateral 6/14/2023    Procedure: LIGATION, ARTERY, SPHENOPALATINE, ENDOSCOPIC;  Surgeon: Jono Russell MD;  Location: [facility] OR 2ND FLR;  Service: ENT;  Laterality: Bilateral;    NASAL ENDOSCOPY N/A 8/8/2024    Procedure: ENDOSCOPY, NOSE;  Surgeon: Jono Russell MD;  Location: [facility] OR 2ND FLR;  Service: ENT;  Laterality: N/A;    PHACOEMULSIFICATION OF CATARACT Left 11/13/2022    Procedure: PHACOEMULSIFICATION, CATARACT;  Surgeon: Alia Mckeon MD;  Location: [facility] OR 1ST FLR;  Service: Ophthalmology;  Laterality: Left;    PHACOEMULSIFICATION OF CATARACT Right 12/04/2022    Procedure: PHACOEMULSIFICATION, CATARACT;  Surgeon: Alia Mckeon MD;  Location: [facility] OR 1ST FLR;  Service: Ophthalmology;  Laterality: Right;    SPINE SURGERY      TRANSESOPHAGEAL ECHOCARDIOGRAPHY N/A 3/22/2023    Procedure: ECHOCARDIOGRAM, TRANSESOPHAGEAL;  Surgeon: Zenia  Diagnostic Provider;  Location: [facility] EP LAB;  Service: Anesthesiology;  Laterality: N/A;    TRANSESOPHAGEAL ECHOCARDIOGRAPHY N/A 4/11/2023    Procedure: ECHOCARDIOGRAM, TRANSESOPHAGEAL;  Surgeon: Dosc Diagnostic Provider;  Location: [facility] EP LAB;  Service: Anesthesiology;  Laterality: N/A;    VASECTOMY         Review of patient's allergies indicates:   Allergen Reactions    Lisinopril     Losartan      Intolerance- elevates potassium level       No current facility-administered medications on file prior to encounter.     Current Outpatient Medications on File Prior to Encounter   Medication Sig    acetaminophen (TYLENOL) 500 MG tablet Take 500 mg by mouth daily as needed for Pain.    albuterol (VENTOLIN HFA) 90 mcg/actuation inhaler Inhale 2 puffs into the lungs every 6 (six) hours as needed for Wheezing. Rescue    allopurinoL (ZYLOPRIM) 100 MG tablet Take 1 tablet (100 mg total) by mouth once daily.    bumetanide (BUMEX) 1 MG tablet TAKE 2 TABLETS EVERY OTHER DAY    ferrous gluconate (FERGON) 324 MG tablet Take 1 tablet (324 mg total) by mouth daily with breakfast.    isosorbide mononitrate (IMDUR) 60 MG 24 hr tablet Take 1 tablet (60 mg total) by mouth every evening.    ketoconazole (NIZORAL) 2 % cream Apply topically 2 (two) times daily. For dry skin around nose and ears    omega-3 fatty acids/fish oil (FISH OIL-OMEGA-3 FATTY ACIDS) 300-1,000 mg capsule Take 1 capsule by mouth once daily.    oxymetazoline (AFRIN) 0.05 % nasal spray 2 sprays by Nasal route as needed (nose bleeds).    QUEtiapine (SEROQUEL) 50 MG tablet Take 1 tablet (50 mg total) by mouth every evening.    rosuvastatin (CRESTOR) 40 MG Tab Take 1 tablet (40 mg total) by mouth every evening.    sodium bicarbonate 650 MG tablet Take 1 tablet (650 mg total) by mouth once daily. (Patient not taking: Reported on 6/10/2025)    sodium chloride (SALINE NASAL) 0.65 % nasal spray Use 2 sprays by Nasal route every 4 hours. Apply into nasal cavities daily with  nightly application of vaseline/aquaphor to anterior nares. Keep head of bed elevated.    warfarin (COUMADIN) 5 MG tablet TAKE 2.5MG MONDAY, WEDNESDAY, FRIDAY AND THEN TAKE 5MG ALL OTHER DAYS OR AS INSTRUCTED BY COUMADIN CLINIC     Family History       Problem Relation (Age of Onset)    Alcohol abuse Father    Diabetes Brother    Heart disease Mother, Father, Brother, Sister    Heart failure Mother, Father, Brother    No Known Problems Sister, Maternal Grandmother, Maternal Grandfather, Paternal Grandmother, Paternal Grandfather, Maternal Aunt, Maternal Uncle, Paternal Aunt, Paternal Uncle          Tobacco Use    Smoking status: Former     Current packs/day: 0.00     Average packs/day: 1 pack/day for 20.0 years (20.0 ttl pk-yrs)     Types: Cigarettes     Start date: 1960     Quit date: 1980     Years since quittin.9     Passive exposure: Never    Smokeless tobacco: Never   Vaping Use    Vaping status: Never Used   Substance and Sexual Activity    Alcohol use: No    Drug use: No    Sexual activity: Not Currently     Partners: Female     Review of Systems   Constitutional:  Negative for activity change, appetite change and fever.   HENT:  Positive for nosebleeds.    Respiratory:  Negative for cough, shortness of breath and wheezing.    Cardiovascular:  Negative for chest pain and leg swelling.   Gastrointestinal:  Negative for abdominal pain, constipation, diarrhea, nausea and vomiting.   Skin:  Negative for rash.   Neurological:  Negative for headaches.     Objective:     Vital Signs (Most Recent):  Temp: 97.5 °F (36.4 °C) (25 1327)  Pulse: 66 (25 1600)  Resp: 20 (25 1500)  BP: (!) 144/65 (25 1327)  SpO2: 100 % (25 1600) Vital Signs (24h Range):  Temp:  [97.5 °F (36.4 °C)] 97.5 °F (36.4 °C)  Pulse:  [65-67] 66  Resp:  [20] 20  SpO2:  [100 %] 100 %  BP: (144)/(65) 144/65     Weight: 65 kg (143 lb 4.8 oz)  Body mass index is 23.85 kg/m².     Physical Exam  Vitals and nursing  note reviewed.   Constitutional:       Appearance: He is ill-appearing (chronically).   HENT:      Head: Normocephalic and atraumatic.      Nose:      Comments: Dried blood in nares, nasal packing in place     Mouth/Throat:      Comments: Dried clot, suctioned out  Eyes:      Conjunctiva/sclera: Conjunctivae normal.   Cardiovascular:      Rate and Rhythm: Normal rate and regular rhythm.      Pulses: Normal pulses.   Pulmonary:      Effort: Pulmonary effort is normal. No respiratory distress.      Breath sounds: No wheezing or rales.   Abdominal:      Palpations: Abdomen is soft.      Tenderness: There is no abdominal tenderness. There is no guarding.   Musculoskeletal:      Cervical back: Normal range of motion.      Right lower leg: No edema.      Left lower leg: No edema.   Skin:     General: Skin is warm and dry.   Neurological:      General: No focal deficit present.      Mental Status: He is alert and oriented to person, place, and time.   Psychiatric:         Mood and Affect: Mood normal.                Significant Labs: All pertinent labs within the past 24 hours have been reviewed.    Significant Imaging: I have reviewed all pertinent imaging results/findings within the past 24 hours.

## 2025-07-27 NOTE — ASSESSMENT & PLAN NOTE
Creatine stable for now. BMP reviewed- noted Estimated Creatinine Clearance: 19.5 mL/min (A) (based on SCr of 2.5 mg/dL (H)). according to latest data. 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  Monitor UOP and serial BMP and adjust therapy as needed. Renally dose meds. Avoid nephrotoxic medications and procedures.

## 2025-07-27 NOTE — ED TRIAGE NOTES
Dariel Urbina, a 84 y.o. male presents to the ED w/ complaint of epitaxis. Pt has a hx of chronic nosebleeds d/t being on Coumadin for artificial valve. Wife states last night and this am the nosebleed was uncontrollable and pt became weak.    Triage note:  Chief Complaint   Patient presents with    Epistaxis     Started 0730 this morning, extensive history of this     Review of patient's allergies indicates:   Allergen Reactions    Lisinopril     Losartan      Intolerance- elevates potassium level     Past Medical History:   Diagnosis Date    Anemia of chronic renal failure, stage 3 (moderate) 05/27/2015    Anticoagulant long-term use     Atherosclerosis of coronary artery bypass graft of native heart without angina pectoris 09/11/2012    3-27-18 [facility] Two vessel coronary artery disease.   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    Bilateral carotid artery disease 02/09/2017    Bleeding from the nose     Bleeding nose 03/21/2018    Cancer     Cataract     CHF (congestive heart failure)     CKD (chronic kidney disease) stage 3, GFR 30-59 ml/min 05/27/2015    Claudication of left lower extremity 09/17/2014    Colon polyp     Encounter for blood transfusion     Gastroesophageal reflux disease without esophagitis 03/19/2018    Gastrointestinal hemorrhage associated with intestinal diverticulosis 04/01/2018    Glaucoma     H/O mechanical aortic valve replacement 09/17/2014    History of gout 09/26/2012    Hyperparathyroidism due to renal insufficiency 07/27/2015    Internal hemorrhoid 04/03/2018    Long term current use of anticoagulant therapy 09/26/2012    Mechanical heart valve present     Metabolic acidosis with normal anion gap and bicarbonate losses 03/20/2018    Mixed hyperlipidemia 09/26/2012    NSTEMI (non-ST elevated myocardial infarction) 03/21/2018    Obesity, diabetes, and hypertension syndrome 02/23/2016    Paroxysmal atrial fibrillation 02/06/2023    PVD (peripheral vascular  disease) 09/11/2012    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

## 2025-07-27 NOTE — HPI
Mr. Urbina is an 83 yo M with CAD s/p CABG in 1990's, mechanical AVR (on warfarin c/b long standing epistaxis) c/b mod-severe MR w/ mitraclip, afib, GIB 2/2 diverticulosis s/p partial colon resection, T2DM (diet controlled), gout, HLD, HTN, CKD4 (Cr 2.3-2.5) presenting with an episode of epistaxis that started today at 7am. He felt weak, but no HA, vision changes, SOB, no new chest pain, no other overt bleeding nor stool changes, no LE edema.     In the ED he is afebrile and hemodynamically stable on RA with anemia to 7.1, Cr 2.5 (at baseline), INR 3.1. 1 u pRBC ordered, TXA ordered, and ENT consulted for nasal packing and recommendations for epistaxis management. Admitted to Hospital Medicine for evaluation of epistaxis in patient with mechanical AVR.

## 2025-07-27 NOTE — ED PROVIDER NOTES
Encounter Date: 7/27/2025       History     Chief Complaint   Patient presents with    Epistaxis     Started 0730 this morning, extensive history of this     Dariel Urbina is an 84-year-old male with a history of Afib, mechanical aortic valve on warfarin, and recurrent epistaxis seen by ENT presenting to the ED with epistaxis starting at roughly 730 this morning.  Patient noted some bleeding and attempted direct pressure at home, which was ineffective.  Bleeding continued for few hours and patient came to the ED.  Here in the ED, patient states he is feeling weak and cool, wife at bedside says he has a little pale, but bleeding is largely controlled with direct pressure.  Patient denies any active abdominal pain or nausea, ongoing leakage of blood down the back of his throat, but he states that it feels like there is a clot.        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    Bilateral carotid artery disease 02/09/2017    Bleeding from the nose     Bleeding nose 03/21/2018    Cancer     Cataract     CHF (congestive heart failure)     CKD (chronic kidney disease) stage 3, GFR 30-59 ml/min 05/27/2015    Claudication of left lower extremity 09/17/2014    Colon polyp     Encounter for blood transfusion     Gastroesophageal reflux disease without esophagitis 03/19/2018    Gastrointestinal hemorrhage associated with intestinal diverticulosis 04/01/2018    Glaucoma     H/O mechanical aortic valve replacement 09/17/2014    History of gout 09/26/2012    Hyperparathyroidism due  to renal insufficiency 07/27/2015    Internal hemorrhoid 04/03/2018    Long term current use of anticoagulant therapy 09/26/2012    Mechanical heart valve present     Metabolic acidosis with normal anion gap and bicarbonate losses 03/20/2018    Mixed hyperlipidemia 09/26/2012    NSTEMI (non-ST elevated myocardial infarction) 03/21/2018    Obesity, diabetes, and hypertension syndrome 02/23/2016    Paroxysmal atrial fibrillation 02/06/2023    PVD (peripheral vascular disease) 09/11/2012    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     Past Surgical History:   Procedure Laterality Date    BACK SURGERY      CARDIAC CATHETERIZATION      CARDIAC VALVE REPLACEMENT      CARDIAC VALVE SURGERY      CARPAL TUNNEL RELEASE Right 05/19/2020    Procedure: RELEASE, CARPAL TUNNEL;  Surgeon: Rupesh Norris Jr., MD;  Location: [facility];  Service: Plastics;  Laterality: Right;    CATARACT EXTRACTION Left 11/13/2022        COLON SURGERY      COLONOSCOPY N/A 03/31/2017    Procedure: COLONOSCOPY;  Surgeon: Bruno Raymond MD;  Location: [facility] (4TH FLR);  Service: Endoscopy;  Laterality: N/A;  Patient's wife requesting date.    COLONOSCOPY N/A 04/03/2018    Procedure: COLONOSCOPY;  Surgeon: Bonifacio Pelletier MD;  Location: [facility] (2ND FLR);  Service: Endoscopy;  Laterality: N/A;    COLONOSCOPY N/A 08/13/2018    Procedure: COLONOSCOPY;  Surgeon: Kam Barba MD;  Location: [facility] ENDO (2ND FLR);  Service: Endoscopy;  Laterality: N/A;  2nd floor: PA pressure 49; hx of moderate-severe valve disease     per Coumadin clinic-Patient can hold 5 days with lovenox bridge       ok to schedule per Katarina     CONTROL OF EPISTAXIS, POSTERIOR, USING NASAL PACKING OR CAUTERIZATION Bilateral 6/18/2024    Procedure: CONTROL OF EPISTAXIS, POSTERIOR, USING NASAL PACKING OR CAUTERIZATION;  Surgeon: Jono Russell MD;  Location: [facility] OR [address] [place] [address];  Service: ENT;  Laterality: Bilateral;    CONTROL OF EPISTAXIS, POSTERIOR, USING NASAL PACKING OR CAUTERIZATION Bilateral 8/8/2024    Procedure: CONTROL OF EPISTAXIS, POSTERIOR, USING NASAL PACKING OR CAUTERIZATION;  Surgeon: Jono Russell MD;  [facility];  Service: ENT;  Laterality: Bilateral;  suction bovie, nasopore, endoscopes    CONTROL OF EPISTAXIS, POSTERIOR, USING NASAL PACKING OR CAUTERIZATION Bilateral 12/30/2024    Procedure: CONTROL OF EPISTAXIS, POSTERIOR, USING NASAL PACKING OR CAUTERIZATION;  Surgeon: Jono Russell MD;  Location: [facility] OR [address] [place] [address];  Service: ENT;  Laterality: Bilateral;    CORONARY ANGIOGRAPHY N/A 10/04/2021    Procedure: Left heart cath +/- peripheral angiogram;  Surgeon: Jose Ruiz MD;  Location: [facility] CATH LAB;  Service: Cardiology;  Laterality: N/A;    CORONARY ANGIOGRAPHY N/A 3/2/2023    Procedure: Angiogram, Coronary;  Surgeon: Devon Garnica MD;  Location: [facility] CATH LAB;  Service: Cardiology;  Laterality: N/A;    CORONARY ANGIOPLASTY      CORONARY ARTERY BYPASS GRAFT      CORONARY BYPASS GRAFT ANGIOGRAPHY  10/04/2021    Procedure: Bypass graft study;  Surgeon: Jose Ruiz MD;  Location: [facility] CATH LAB;  Service: Cardiology;;    CORONARY BYPASS GRAFT ANGIOGRAPHY  3/2/2023    Procedure: Bypass graft study;  Surgeon: Devon Garnica MD;  Location: [facility] CATH LAB;  Service: Cardiology;;    ECHOCARDIOGRAM,TRANSESOPHAGEAL N/A 4/14/2023    Procedure: Transesophageal echo (KIRSTEN) intra-procedure log documentation;  Surgeon: [facility] Diagnostic Provider;  Location: [facility] EP LAB;  Service: Cardiology;  Laterality: N/A;    ENDOSCOPIC NASAL CAUTERIZATION Bilateral 11/3/2023    Procedure: CAUTERIZATION, NOSE, ENDOSCOPIC;  Surgeon:  Jono Russell MD;  Location: [facility] OR 2ND FLR;  Service: ENT;  Laterality: Bilateral;    ENDOSCOPIC NASAL CAUTERIZATION N/A 12/18/2023    Procedure: CAUTERIZATION, NOSE, ENDOSCOPIC;  Surgeon: Jono Russell MD;  Location: [facility] OR 2ND FLR;  Service: ENT;  Laterality: N/A;    ENDOSCOPIC NASAL CAUTERIZATION N/A 2/26/2025    Procedure: CAUTERIZATION, NOSE, ENDOSCOPIC;  Surgeon: Jono Russell MD;  Location: [facility] OR 2ND FLR;  Service: ENT;  Laterality: N/A;    ENDOSCOPIC NASAL CAUTERIZATION Bilateral 3/6/2025    Procedure: CAUTERIZATION, NOSE, ENDOSCOPIC;  Surgeon: Jono Russell MD;  Location: [facility] OR 2ND FLR;  Service: ENT;  Laterality: Bilateral;  If he has an outpatient case request for same day, use the inpatient one instead.    EYE SURGERY      FESS, WITH IMAGING GUIDANCE Left 2/28/2024    Procedure: FESS, WITH IMAGING GUIDANCE;  Surgeon: Jono Russell MD;  [facility];  Service: ENT;  Laterality: Left;  Scan loaded ENT [employer]. CL    FLUOROSCOPY Bilateral 10/29/2024    Procedure: Fluoroscopy;  Surgeon: Sameer Espitia MD;  Location: [facility] CATH LAB/EP;  Service: Cardiology;  Laterality: Bilateral;  fluoroscopy of the mechanical aortic valve    FUNCTIONAL ENDOSCOPIC SINUS SURGERY (FESS) Bilateral 6/14/2023    Procedure: FESS (FUNCTIONAL ENDOSCOPIC SINUS SURGERY);  Surgeon: Jono Russell MD;  [facility];  Service: ENT;  Laterality: Bilateral;    HERNIA REPAIR      INTRAOCULAR PROSTHESES INSERTION Left 11/13/2022    Procedure: INSERTION, IOL PROSTHESIS;  Surgeon: Alia Mckeon MD;  Location: [facility] OR 1ST FLR;  Service: Ophthalmology;  Laterality: Left;    INTRAOCULAR PROSTHESES INSERTION Right 12/04/2022    Procedure: INSERTION, IOL PROSTHESIS;  Surgeon: Alia Mckeon MD;  Location: [facility] OR 1ST FLR;  Service: Ophthalmology;  Laterality: Right;    LIGATION, ARTERY, ETHMOIDAL Left 2/28/2024    Procedure: LIGATION, ARTERY, ETHMOIDAL;  Surgeon: Jono Russell MD;   Location: [facility] OR 2ND FLR;  Service: ENT;  Laterality: Left;    LIGATION, ARTERY, SPHENOPALATINE, ENDOSCOPIC Bilateral 6/14/2023    Procedure: LIGATION, ARTERY, SPHENOPALATINE, ENDOSCOPIC;  Surgeon: Jono Russell MD;  Location: [facility] OR 2ND FLR;  Service: ENT;  Laterality: Bilateral;    NASAL ENDOSCOPY N/A 8/8/2024    Procedure: ENDOSCOPY, NOSE;  Surgeon: Jono Russell MD;  Location: [facility] OR 2ND FLR;  Service: ENT;  Laterality: N/A;    PHACOEMULSIFICATION OF CATARACT Left 11/13/2022    Procedure: PHACOEMULSIFICATION, CATARACT;  Surgeon: Alia Mckeon MD;  Location: [facility] OR 1ST FLR;  Service: Ophthalmology;  Laterality: Left;    PHACOEMULSIFICATION OF CATARACT Right 12/04/2022    Procedure: PHACOEMULSIFICATION, CATARACT;  Surgeon: Alia Mckeon MD;  [facility];  Service: Ophthalmology;  Laterality: Right;    SPINE SURGERY      TRANSESOPHAGEAL ECHOCARDIOGRAPHY N/A 3/22/2023    Procedure: ECHOCARDIOGRAM, TRANSESOPHAGEAL;  Surgeon: [facility] Diagnostic Provider;  Location: [facility] EP LAB;  Service: Anesthesiology;  Laterality: N/A;    TRANSESOPHAGEAL ECHOCARDIOGRAPHY N/A 4/11/2023    Procedure: ECHOCARDIOGRAM, TRANSESOPHAGEAL;  Surgeon: [facility] Diagnostic Provider;  Location: [facility] EP LAB;  Service: Anesthesiology;  Laterality: N/A;    VASECTOMY       Family History   Problem Relation Name Age of Onset    Heart failure Mother      Heart disease Mother      Heart failure Father      Heart disease Father      Alcohol abuse Father      Heart failure Brother      Heart disease Brother      Diabetes Brother      No Known Problems Sister      No Known Problems Maternal Grandmother      No Known Problems Maternal Grandfather      No Known Problems Paternal Grandmother      No Known Problems Paternal Grandfather      Heart disease Sister      No Known Problems Maternal Aunt      No Known Problems Maternal Uncle      No Known Problems Paternal Aunt      No Known Problems Paternal Uncle      Amblyopia Neg Hx       Blindness Neg Hx      Cancer Neg Hx      Cataracts Neg Hx      Glaucoma Neg Hx      Hypertension Neg Hx      Macular degeneration Neg Hx      Retinal detachment Neg Hx      Strabismus Neg Hx      Stroke Neg Hx      Thyroid disease Neg Hx      Anemia Neg Hx      Arrhythmia Neg Hx      Asthma Neg Hx      Clotting disorder Neg Hx      Fainting Neg Hx      Heart attack Neg Hx      Hyperlipidemia Neg Hx      Atrial Septal Defect Neg Hx      Melanoma Neg Hx       Social History[1]  Review of Systems  10-point Review of Systems was performed and is negative/non-contributory unless otherwise stated in above HPI.    Physical Exam     Initial Vitals [07/27/25 1327]   BP Pulse Resp Temp SpO2   (!) 144/65 65 20 97.5 °F (36.4 °C) 100 %      MAP       --         Physical Exam    Vitals reviewed.  Constitutional: He appears well-developed and well-nourished. He is not diaphoretic. No distress.   HENT:   Head: Normocephalic and atraumatic.   Nose: Epistaxis is observed.   Epistaxis and large clot in left naris, large septal perforation, large clot in back of the throat with evidence of mild ongoing bleeding   Eyes: Conjunctivae and EOM are normal. Pupils are equal, round, and reactive to light.   Bilateral conjunctival pallor noted   Neck: Neck supple. No tracheal deviation present. No JVD present.   Normal range of motion.  Cardiovascular:  Normal rate, regular rhythm, normal heart sounds and intact distal pulses.     Exam reveals no gallop and no friction rub.       No murmur heard.  Pulmonary/Chest: Breath sounds normal. No respiratory distress. He has no wheezes. He has no rhonchi. He has no rales.   Abdominal: Abdomen is soft. Bowel sounds are normal. There is no abdominal tenderness.   Musculoskeletal:         General: Normal range of motion.      Cervical back: Normal range of motion and neck supple.     Neurological: He is alert and oriented to person, place, and time. He has normal strength.   Skin: Skin is warm and dry.    Psychiatric: He has a normal mood and affect. His behavior is normal. Thought content normal.         ED Course   Procedures  Labs Reviewed   COMPREHENSIVE METABOLIC PANEL - Abnormal       Result Value    Sodium 138      Potassium 4.4      Chloride 108      CO2 23      Glucose 105      BUN 47 (*)     Creatinine 2.5 (*)     Calcium 9.7      Protein Total 7.4      Albumin 3.2 (*)     Bilirubin Total 0.5      ALP 92      AST 51 (*)     ALT 41      Anion Gap 7 (*)     eGFR 25 (*)    PROTIME-INR - Abnormal    PT 31.2 (*)     INR 3.1 (*)    CBC WITH DIFFERENTIAL - Abnormal    WBC 5.49      RBC 2.31 (*)     HGB 7.1 (*)     HCT 22.5 (*)     MCV 97      MCH 30.7      MCHC 31.6 (*)     RDW 14.5      Platelet Count 174      MPV 9.7      Nucleated RBC 0      Neut % 71.2      Lymph % 18.8      Mono % 8.0      Eos % 1.1      Basophil % 0.5      Imm Grans % 0.4      Neut # 3.91      Lymph # 1.03      Mono # 0.44      Eos # 0.06      Baso # 0.03      Imm Grans # 0.02     CBC W/ AUTO DIFFERENTIAL    Narrative:     The following orders were created for panel order CBC auto differential.  Procedure                               Abnormality         Status                     ---------                               -----------         ------                     CBC with Differential[[id number]]       Abnormal            Final result                 Please view results for these tests on the individual orders.   TYPE & SCREEN    Specimen Outdate 07/30/2025 23:59      Group & Rh O NEG      Indirect Justyna NEG     PREPARE RBC SOFT    UNIT NUMBER [id number]      UNIT ABO/RH O NEG      DISPENSE STATUS Issued      Unit Expiration [id number]      Product Code [id number]      Unit Blood Type Code 9500      CROSSMATCH INTERPRETATION Compatible            Imaging Results    None          Medications   0.9%  NaCl infusion (for blood administration) (has no administration in time range)   allopurinoL tablet 100 mg (has no administration in  time range)   ferrous gluconate tablet 324 mg (has no administration in time range)   QUEtiapine tablet 50 mg (has no administration in time range)   atorvastatin tablet 40 mg (has no administration in time range)   sodium chloride 0.9% flush 10 mL (has no administration in time range)   naloxone 0.4 mg/mL injection 0.02 mg (has no administration in time range)   glucose chewable tablet 16 g (has no administration in time range)   glucose chewable tablet 24 g (has no administration in time range)   dextrose 50% injection 12.5 g (has no administration in time range)   dextrose 50% injection 25 g (has no administration in time range)   glucagon (human recombinant) injection 1 mg (has no administration in time range)   acetaminophen tablet 650 mg (has no administration in time range)   HYDROmorphone injection 1 mg (has no administration in time range)   ondansetron injection 4 mg (has no administration in time range)   isosorbide mononitrate 24 hr tablet 60 mg (60 mg Oral Given 7/27/25 1732)   acetaminophen tablet 1,000 mg (1,000 mg Oral Given 7/27/25 1732)   sodium chloride 0.65 % nasal spray 1 spray (has no administration in time range)   tranexamic acid injection Soln 1,000 mg (1,000 mg Nasal Given by Provider 7/27/25 1500)   tranexamic acid injection Soln 1,000 mg (1,000 mg Nasal Given by Provider 7/27/25 1815)     Medical Decision Making  Dariel Urbina is a 84 y.o. male with a past medical history of mechanical heart valve on warfarin and recurrent epistaxis presenting to the ED with epistaxis. History and physical exam as above. Initial vital signs stable and non-actionable. Initial work-up to include: Labs (CBC, CMP, Type and Screen), TXA with nasal packing (rhino rocket)    Differential diagnosis for this patient includes, but is not limited to:  Epistaxis, likely anterior given large control of bleeding with direct pressure.  TXA soaked rhino rocket placed in left nostril with good control of large  bleeding, with some ongoing minimal oozing around the rhino rocket.  ENT consulted for evaluation of recurrent epistaxis in the setting of large septal perforation.  CBC showing hemoglobin of 7.1 with active bleeding, consented patient for blood transfusion and admitted patient for H/H trending and ENT evaluation.          Amount and/or Complexity of Data Reviewed  Labs: ordered.    Risk  Prescription drug management.                      Oscar Hill MD  Emergency Medicine, PGY-2  [facility]                      Clinical Impression:  Final diagnoses:  [R04.0] Epistaxis (Primary)  [D64.9] Symptomatic anemia          ED Disposition Condition    Observation                       [1]   Social History  Tobacco Use    Smoking status: Former     Current packs/day: 0.00     Average packs/day: 1 pack/day for 20.0 years (20.0 ttl pk-yrs)     Types: Cigarettes     Start date: 1960     Quit date: 1980     Years since quittin.9     Passive exposure: Never    Smokeless tobacco: Never   Vaping Use    Vaping status: Never Used   Substance Use Topics    Alcohol use: No    Drug use: No        Oscar Howell MD  Resident  25

## 2025-07-27 NOTE — ASSESSMENT & PLAN NOTE
Anemia is likely due to acute blood loss which was from epistaxis and chronic disease due to Chronic Kidney Disease. Most recent hemoglobin and hematocrit are listed below.  Recent Labs     07/27/25  1512   HGB 7.1*   HCT 22.5*     Plan  - Monitor serial CBC: Daily  - Transfuse PRBC if patient becomes hemodynamically unstable, symptomatic or H/H drops below 7/21.  - Patient has received 1 units of PRBCs on 07/27  - Patient's anemia is currently stable

## 2025-07-27 NOTE — ASSESSMENT & PLAN NOTE
- appreciated ENT evaluation in the ED, follow up recs  - s/p TXA and 1 u pRBC ordered in ED  - given INR 3 on admission, hold coumadin tonight, check INR in AM

## 2025-07-28 ENCOUNTER — ANESTHESIA EVENT (OUTPATIENT)
Dept: SURGERY | Facility: HOSPITAL | Age: 85
End: 2025-07-28
Payer: MEDICARE

## 2025-07-28 ENCOUNTER — PATIENT MESSAGE (OUTPATIENT)
Dept: INTERNAL MEDICINE | Facility: CLINIC | Age: 85
End: 2025-07-28
Payer: MEDICARE

## 2025-07-28 LAB
ABSOLUTE EOSINOPHIL (OHS): 0.06 K/UL
ABSOLUTE MONOCYTE (OHS): 0.51 K/UL (ref 0.3–1)
ABSOLUTE NEUTROPHIL COUNT (OHS): 2.48 K/UL (ref 1.8–7.7)
ANION GAP (OHS): 9 MMOL/L (ref 8–16)
BASOPHILS # BLD AUTO: 0.04 K/UL
BASOPHILS NFR BLD AUTO: 1 %
BUN SERPL-MCNC: 55 MG/DL (ref 8–23)
CALCIUM SERPL-MCNC: 9.2 MG/DL (ref 8.7–10.5)
CHLORIDE SERPL-SCNC: 109 MMOL/L (ref 95–110)
CO2 SERPL-SCNC: 18 MMOL/L (ref 23–29)
CREAT SERPL-MCNC: 2.4 MG/DL (ref 0.5–1.4)
ERYTHROCYTE [DISTWIDTH] IN BLOOD BY AUTOMATED COUNT: 14.8 % (ref 11.5–14.5)
GFR SERPLBLD CREATININE-BSD FMLA CKD-EPI: 26 ML/MIN/1.73/M2
GLUCOSE SERPL-MCNC: 67 MG/DL (ref 70–110)
HCT VFR BLD AUTO: 24.1 % (ref 40–54)
HGB BLD-MCNC: 7.6 GM/DL (ref 14–18)
IMM GRANULOCYTES # BLD AUTO: 0.02 K/UL (ref 0–0.04)
IMM GRANULOCYTES NFR BLD AUTO: 0.5 % (ref 0–0.5)
INR PPP: 2.9 (ref 0.8–1.2)
LYMPHOCYTES # BLD AUTO: 0.8 K/UL (ref 1–4.8)
MCH RBC QN AUTO: 30 PG (ref 27–31)
MCHC RBC AUTO-ENTMCNC: 31.5 G/DL (ref 32–36)
MCV RBC AUTO: 95 FL (ref 82–98)
NUCLEATED RBC (/100WBC) (OHS): 0 /100 WBC
PLATELET # BLD AUTO: 154 K/UL (ref 150–450)
PMV BLD AUTO: 11.2 FL (ref 9.2–12.9)
POTASSIUM SERPL-SCNC: 4.1 MMOL/L (ref 3.5–5.1)
PROTHROMBIN TIME: 29.3 SECONDS (ref 9–12.5)
RBC # BLD AUTO: 2.53 M/UL (ref 4.6–6.2)
RELATIVE EOSINOPHIL (OHS): 1.5 %
RELATIVE LYMPHOCYTE (OHS): 20.5 % (ref 18–48)
RELATIVE MONOCYTE (OHS): 13 % (ref 4–15)
RELATIVE NEUTROPHIL (OHS): 63.5 % (ref 38–73)
SODIUM SERPL-SCNC: 136 MMOL/L (ref 136–145)
WBC # BLD AUTO: 3.91 K/UL (ref 3.9–12.7)

## 2025-07-28 PROCEDURE — G0378 HOSPITAL OBSERVATION PER HR: HCPCS | Mod: HCNC

## 2025-07-28 PROCEDURE — 25000003 PHARM REV CODE 250: Mod: HCNC | Performed by: STUDENT IN AN ORGANIZED HEALTH CARE EDUCATION/TRAINING PROGRAM

## 2025-07-28 PROCEDURE — 36415 COLL VENOUS BLD VENIPUNCTURE: CPT | Mod: HCNC

## 2025-07-28 PROCEDURE — 80048 BASIC METABOLIC PNL TOTAL CA: CPT | Mod: HCNC

## 2025-07-28 PROCEDURE — 85025 COMPLETE CBC W/AUTO DIFF WBC: CPT | Mod: HCNC

## 2025-07-28 PROCEDURE — 25000003 PHARM REV CODE 250: Mod: HCNC | Performed by: INTERNAL MEDICINE

## 2025-07-28 PROCEDURE — 25000003 PHARM REV CODE 250: Mod: HCNC

## 2025-07-28 PROCEDURE — 85610 PROTHROMBIN TIME: CPT | Mod: HCNC

## 2025-07-28 RX ORDER — OXYMETAZOLINE HCL 0.05 %
2 SPRAY, NON-AEROSOL (ML) NASAL 2 TIMES DAILY PRN
Status: DISCONTINUED | OUTPATIENT
Start: 2025-07-28 | End: 2025-07-30 | Stop reason: HOSPADM

## 2025-07-28 RX ADMIN — ALLOPURINOL 100 MG: 100 TABLET ORAL at 10:07

## 2025-07-28 RX ADMIN — Medication 324 MG: at 10:07

## 2025-07-28 RX ADMIN — QUETIAPINE FUMARATE 50 MG: 25 TABLET ORAL at 08:07

## 2025-07-28 RX ADMIN — AMOXICILLIN AND CLAVULANATE POTASSIUM 2 TABLET: 250; 125 TABLET, FILM COATED ORAL at 10:07

## 2025-07-28 RX ADMIN — ATORVASTATIN CALCIUM 40 MG: 40 TABLET, FILM COATED ORAL at 10:07

## 2025-07-28 RX ADMIN — ISOSORBIDE MONONITRATE 60 MG: 60 TABLET, EXTENDED RELEASE ORAL at 08:07

## 2025-07-28 RX ADMIN — AMOXICILLIN AND CLAVULANATE POTASSIUM 2 TABLET: 250; 125 TABLET, FILM COATED ORAL at 08:07

## 2025-07-28 RX ADMIN — AMOXICILLIN AND CLAVULANATE POTASSIUM 2 TABLET: 250; 125 TABLET, FILM COATED ORAL at 01:07

## 2025-07-28 RX ADMIN — NASAL 1 SPRAY: 6.5 SPRAY NASAL at 08:07

## 2025-07-28 NOTE — SUBJECTIVE & OBJECTIVE
Interval History: Pt seen and examined this morning on shahida OLIVO. Patient admitted for recurrent epistaxis in setting of warfarin use. INR 2.9 this morning. Patient with nose packing in place. Reports no bleed-through. Hgb stable this morning. Care plan reviewed. Otherwise, doing well and with no further complaints at this time.        Objective:     Vital Signs (Most Recent):  Temp: 96.9 °F (36.1 °C) (07/28/25 0804)  Pulse: 64 (07/28/25 0804)  Resp: 16 (07/28/25 0804)  BP: (!) 159/62 (07/28/25 0804)  SpO2: 99 % (07/28/25 0804) Vital Signs (24h Range):  Temp:  [96.9 °F (36.1 °C)-98.5 °F (36.9 °C)] 96.9 °F (36.1 °C)  Pulse:  [55-84] 64  Resp:  [16-20] 16  SpO2:  [96 %-100 %] 99 %  BP: (122-196)/(48-77) 159/62     Weight: 65 kg (143 lb 4.8 oz)  Body mass index is 23.85 kg/m².    Intake/Output Summary (Last 24 hours) at 7/28/2025 0841  Last data filed at 7/27/2025 1951  Gross per 24 hour   Intake 397.5 ml   Output --   Net 397.5 ml         Physical Exam  Vitals and nursing note reviewed.   Constitutional:       Appearance: He is ill-appearing (chronically).   HENT:      Head: Normocephalic and atraumatic.      Nose:      Comments: Dried blood in nares, nasal packing in place  Eyes:      Conjunctiva/sclera: Conjunctivae normal.   Cardiovascular:      Rate and Rhythm: Normal rate and regular rhythm.      Pulses: Normal pulses.   Pulmonary:      Effort: Pulmonary effort is normal. No respiratory distress.      Breath sounds: No wheezing or rales.   Abdominal:      Palpations: Abdomen is soft.      Tenderness: There is no abdominal tenderness. There is no guarding.   Musculoskeletal:      Cervical back: Normal range of motion.      Right lower leg: No edema.      Left lower leg: No edema.   Skin:     General: Skin is warm and dry.   Neurological:      General: No focal deficit present.      Mental Status: He is alert and oriented to person, place, and time.   Psychiatric:         Mood and Affect: Mood normal.            Significant Labs: All pertinent labs within the past 24 hours have been reviewed.    Significant Imaging: I have reviewed all pertinent imaging results/findings within the past 24 hours.

## 2025-07-28 NOTE — ASSESSMENT & PLAN NOTE
Creatine stable for now. BMP reviewed- noted Estimated Creatinine Clearance: 20.3 mL/min (A) (based on SCr of 2.4 mg/dL (H)). according to latest data. 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  Monitor UOP and serial BMP and adjust therapy as needed. Renally dose meds. Avoid nephrotoxic medications and procedures.

## 2025-07-28 NOTE — ASSESSMENT & PLAN NOTE
83 yo male with heart valve on Warfarin and recurrent epistaxis presents to ER due to left sided epistaxis. Currently packed with bilateral merocels. Hb 7.1 in ED, receiving 1u pRBC and admitted to medicine.     - Maintain bilateral merocel  - Please place patient on anti-staph antibiotics while packing is in place  - Afrin to bilateral nares q8h for the next 48h  - May place mustache dressing under nose for trickling, ok if dressing becomes saturated with some red/brown drainage  - Nasal saline q4h while packing in place, even on side with nasal packing. Can continue nasal saline sprays to keep mucosa moist to prevent future bleeding.  - Keep head of bed elevated  - Apply Afrin to affected nasal cavity if epistaxis recurs, please see detailed instructions below   - Please call ENT with questions  - Remainder of care per primary team

## 2025-07-28 NOTE — HPI
85 yo M well known to the ENT service is presenting with left sided epistaxis. Has undergone multiple cauterizations and an SPA ligation with Dr. Russell. Reports his nosebleed started this morning and didn't stop with pressure. He is admitted to hospital medicine and currently receiving 1u pRBC for Hb of 7.1.

## 2025-07-28 NOTE — ASSESSMENT & PLAN NOTE
- appreciated ENT evaluation in the ED, follow up recs  - s/p TXA and 1 u pRBC ordered in ED  - given INR 3 -> 2.9, hold coumadin  - Pharmacy consult for warfarin dosing

## 2025-07-28 NOTE — CONSULTS
Pharmacy to review dose of warfarin  Dariel Urbina is a 84 y.o. male on warfarin therapy for mechanical AVR, Acarlos. PharmD has been consulted for warfarin dosing.    Current order: none  Home dose: Warfarin 5 mg Sunday, Tuesday, Thursday; Warfarin 2.5 mg all other days  Coumadin clinic enrollment: Active  INR goal: 2.0-2.5    Lab Results   Component Value Date    INR 2.9 (H) 07/28/2025    INR 3.1 (H) 07/27/2025    INR 2.4 (H) 07/21/2025    PROTIME 29.3 (H) 07/28/2025    PROTIME 31.2 (H) 07/27/2025    PROTIME 24.5 (H) 07/21/2025     Significant drug interactions: Allopurinol can increased anticoagulant effect of Warfarin  Diet: Adult Regular      Recommendation(s):   INR 2.9; level above goal of 2.0-2.5   Warfarin is still being held   Daily INR  Pharmacy will continue to follow and monitor warfarin restart    Thank you for the consult,  Payton Leavitt  Extension [phone]    **Note: Consults are reviewed Monday-Friday 7:00am-3:30pm. The above recommendations are only suggested. The recommendations should be considered in conjunction with all patient factors.**

## 2025-07-28 NOTE — ANESTHESIA PREPROCEDURE EVALUATION
[facility]  Anesthesia Pre-Operative Evaluation         Patient Name/: Dariel Urbina, 1940  MRN: [id number]    SUBJECTIVE:     Pre-operative evaluation for Procedure(s) (LRB):  CONTROL OF HEMORRHAGE, NASOPHARYNX, COMPLICATED (Bilateral)     2025    Dariel Urbina is a 84 y.o. male w/ a significant PMHx of T2D not on insulin, CKD, paroxysmal afib, GERD, CAD, HTN, CHF, anf recurrent epistaxis admitted for epistaxis with associated weakness. Hgb stable at 7-8. Patient now presents for the above procedure(s).    Patient denies any other known cardiopulmonary disease.     Level of Care:  Telemetry    NPO status:  NPO since midnight    Access:  20G LUE  20G R forearm    Hemodynamics:  Stable    Previous Airway:  Date/Time: 3/6/2025 9:14 AM     Performed by: Tiffanie Ashley CRNA  Authorized by: Derick Farrell MD    Intubation:     Induction:  Intravenous    Intubated:  Postinduction    Mask Ventilation:  Not attempted    Attempts:  1    Attempted By:  CRNA    Method of Intubation:  Video laryngoscopy    Blade:  Osorio 3    Laryngeal View Grade: Grade I - full view of cords      Difficult Airway Encountered?: No      Complications:  None    Airway Device:  Oral endotracheal tube    Airway Device Size:  7.5    Style/Cuff Inflation:  Cuffed    Inflation Amount (mL):  8    Tube secured:  21    Secured at:  The lips    Placement Verified By:  Capnometry    Complicating Factors:  None    Findings Post-Intubation:  BS equal bilateral and atraumatic/condition of teeth unchanged    ________________________________________  Results for orders placed during the hospital encounter of 24    Echo    Interpretation Summary    Left Ventricle: The left ventricle is normal in size. Mildly increased wall thickness. Mild septal thickening. There is concentric remodeling. Regional wall motion abnormalities present. Septal motion is abnormal. There is mildly reduced systolic function with a  visually estimated ejection fraction of 40 - 45%. Ejection fraction by visual approximation is 43%. Grade III diastolic dysfunction.    Right Ventricle: Mild right ventricular enlargement. Wall thickness is normal. Systolic function is borderline low.    Left Atrium: Left atrium is severely dilated.    Right Atrium: Right atrium is moderately dilated.    Aortic Valve: There is a mechanical valve in the aortic position. There is moderate stenosis. Aortic valve area by VTI is 1.30 cm2. Aortic valve peak velocity is 2.84 m/s. Mean gradient is 20 mmHg. The dimensionless index is 0.50.    Mitral Valve: There is moderate bileaflet sclerosis. There is mild mitral annular calcification present. There is moderate to severe regurgitation.    Tricuspid Valve: There is moderate to severe regurgitation.    Pulmonic Valve: There is mild regurgitation.    Pulmonary Artery: There is severe pulmonary hypertension. The estimated pulmonary artery systolic pressure is 79 mmHg.    IVC/SVC: Intermediate venous pressure at 8 mmHg.    ________________________________________    LDA:   Peripheral IV Single Lumen 07/27/25 1510 20 G Anterior;Distal;Left Upper Arm (Active)   Site Assessment Clean;Dry;Intact;No redness;No swelling 07/27/25 2100   Extremity Assessment Distal to IV No warmth;No swelling;No redness;No abnormal discoloration 07/27/25 2100   Line Status Saline locked 07/27/25 2100   Dressing Status Clean;Dry;Intact 07/27/25 2100   Dressing Intervention Integrity maintained 07/27/25 2100   Number of days: 0       Peripheral IV Single Lumen 07/27/25 1618 20 G Posterior;Right Forearm (Active)   Site Assessment Clean;Dry;Intact 07/27/25 2100   Line Status Blood return noted 07/27/25 2100   Dressing Status Clean;Intact;Dry 07/27/25 2100   Number of days: 0       Drips: None documented      Problem List[1]    Review of patient's allergies indicates:   Allergen Reactions    Lisinopril     Losartan      Intolerance- elevates potassium level        Current Inpatient Medications:    allopurinoL  100 mg Oral Daily    amoxicillin-clavulanate 250-125mg  2 tablet Oral Q12H    atorvastatin  40 mg Oral Daily    ferrous gluconate  324 mg Oral Daily with breakfast    isosorbide mononitrate  60 mg Oral QHS    QUEtiapine  50 mg Oral QHS    sodium chloride  1 spray Each Nostril Q4H       Medications Ordered Prior to Encounter[2]    Past Surgical History:   Procedure Laterality Date    BACK SURGERY      CARDIAC CATHETERIZATION      CARDIAC VALVE REPLACEMENT      CARDIAC VALVE SURGERY      CARPAL TUNNEL RELEASE Right 05/19/2020    Procedure: RELEASE, CARPAL TUNNEL;  Surgeon: Rupesh Norris Jr., MD;  Location: [facility];  Service: Plastics;  Laterality: Right;    CATARACT EXTRACTION Left 11/13/2022        COLON SURGERY      COLONOSCOPY N/A 03/31/2017    Procedure: COLONOSCOPY;  Surgeon: Bruno Raymond MD;  Location: [facility] (4TH FLR);  Service: Endoscopy;  Laterality: N/A;  Patient's wife requesting date.    COLONOSCOPY N/A 04/03/2018    Procedure: COLONOSCOPY;  Surgeon: Bonifacio Pelletier MD;  Location: [facility] ENDO (2ND FLR);  Service: Endoscopy;  Laterality: N/A;    COLONOSCOPY N/A 08/13/2018    Procedure: COLONOSCOPY;  Surgeon: Kam Barba MD;  Location: [facility] ENDO (2ND FLR);  Service: Endoscopy;  Laterality: N/A;  2nd floor: PA pressure 49; hx of moderate-severe valve disease     per Coumadin clinic-Patient can hold 5 days with lovenox bridge       ok to schedule per Katarina    CONTROL OF EPISTAXIS, POSTERIOR, USING NASAL PACKING OR CAUTERIZATION Bilateral 6/18/2024    Procedure: CONTROL OF EPISTAXIS, POSTERIOR, USING NASAL PACKING OR CAUTERIZATION;  Surgeon: Jono Russell MD;  Location: [facility] OR 2ND FLR;  Service: ENT;  Laterality: Bilateral;    CONTROL OF EPISTAXIS, POSTERIOR, USING NASAL PACKING OR CAUTERIZATION Bilateral 8/8/2024    Procedure: CONTROL OF EPISTAXIS, POSTERIOR, USING NASAL PACKING OR CAUTERIZATION;  Surgeon: Jono Russell  MD;  Location: [facility] OR [facility] FLR;  Service: ENT;  Laterality: Bilateral;  suction bovie, nasopore, endoscopes    CONTROL OF EPISTAXIS, POSTERIOR, USING NASAL PACKING OR CAUTERIZATION Bilateral 12/30/2024    Procedure: CONTROL OF EPISTAXIS, POSTERIOR, USING NASAL PACKING OR CAUTERIZATION;  Surgeon: Jono Russell MD;  [facility];  Service: ENT;  Laterality: Bilateral;    CORONARY ANGIOGRAPHY N/A 10/04/2021    Procedure: Left heart cath +/- peripheral angiogram;  Surgeon: Jose Ruiz MD;  Location: [facility] CATH LAB;  Service: Cardiology;  Laterality: N/A;    CORONARY ANGIOGRAPHY N/A 3/2/2023    Procedure: Angiogram, Coronary;  Surgeon: Devon Garnica MD;  Location: [facility] CATH LAB;  Service: Cardiology;  Laterality: N/A;    CORONARY ANGIOPLASTY      CORONARY ARTERY BYPASS GRAFT      CORONARY BYPASS GRAFT ANGIOGRAPHY  10/04/2021    Procedure: Bypass graft study;  Surgeon: Jose Ruiz MD;  Location: [facility] CATH LAB;  Service: Cardiology;;    CORONARY BYPASS GRAFT ANGIOGRAPHY  3/2/2023    Procedure: Bypass graft study;  Surgeon: Devon Garnica MD;  Location: [facility] CATH LAB;  Service: Cardiology;;    ECHOCARDIOGRAM,TRANSESOPHAGEAL N/A 4/14/2023    Procedure: Transesophageal echo (KIRSTEN) intra-procedure log documentation;  Surgeon: [facility] Diagnostic Provider;  Location: [facility] EP LAB;  Service: Cardiology;  Laterality: N/A;    ENDOSCOPIC NASAL CAUTERIZATION Bilateral 11/3/2023    Procedure: CAUTERIZATION, NOSE, ENDOSCOPIC;  Surgeon: Jono Russell MD;  [address];  Service: ENT;  Laterality: Bilateral;    ENDOSCOPIC NASAL CAUTERIZATION N/A 12/18/2023    Procedure: CAUTERIZATION, NOSE, ENDOSCOPIC;  Surgeon: Jono Russell MD;  [facility];  Service: ENT;  Laterality: N/A;    ENDOSCOPIC NASAL CAUTERIZATION N/A 2/26/2025    Procedure: CAUTERIZATION, NOSE, ENDOSCOPIC;  Surgeon: Jono Russell MD;  [facility];  Service: ENT;  Laterality: N/A;    ENDOSCOPIC NASAL  CAUTERIZATION Bilateral 3/6/2025    Procedure: CAUTERIZATION, NOSE, ENDOSCOPIC;  Surgeon: Jono Russell MD;  Location: [facility] OR 2ND FLR;  Service: ENT;  Laterality: Bilateral;  If he has an outpatient case request for same day, use the inpatient one instead.    EYE SURGERY      FESS, WITH IMAGING GUIDANCE Left 2/28/2024    Procedure: FESS, WITH IMAGING GUIDANCE;  Surgeon: Jono Russell MD;  Location: [facility] OR 2ND FLR;  Service: ENT;  Laterality: Left;  Scan loaded ENT [employer]. CL    FLUOROSCOPY Bilateral 10/29/2024    Procedure: Fluoroscopy;  Surgeon: Sameer Espitia MD;  Location: [facility] CATH LAB/EP;  Service: Cardiology;  Laterality: Bilateral;  fluoroscopy of the mechanical aortic valve    FUNCTIONAL ENDOSCOPIC SINUS SURGERY (FESS) Bilateral 6/14/2023    Procedure: FESS (FUNCTIONAL ENDOSCOPIC SINUS SURGERY);  Surgeon: Jono Russell MD;  Location: [facility] OR 2ND FLR;  Service: ENT;  Laterality: Bilateral;    HERNIA REPAIR      INTRAOCULAR PROSTHESES INSERTION Left 11/13/2022    Procedure: INSERTION, IOL PROSTHESIS;  Surgeon: Alia Mckeon MD;  Location: [facility] OR 1ST FLR;  Service: Ophthalmology;  Laterality: Left;    INTRAOCULAR PROSTHESES INSERTION Right 12/04/2022    Procedure: INSERTION, IOL PROSTHESIS;  Surgeon: Alia Mckeon MD;  Location: [facility] OR 1ST FLR;  Service: Ophthalmology;  Laterality: Right;    LIGATION, ARTERY, ETHMOIDAL Left 2/28/2024    Procedure: LIGATION, ARTERY, ETHMOIDAL;  Surgeon: Jono Russell MD;  Location: [facility] OR 2ND FLR;  Service: ENT;  Laterality: Left;    LIGATION, ARTERY, SPHENOPALATINE, ENDOSCOPIC Bilateral 6/14/2023    Procedure: LIGATION, ARTERY, SPHENOPALATINE, ENDOSCOPIC;  Surgeon: Jono Russell MD;  Location: [facility] OR 2ND FLR;  Service: ENT;  Laterality: Bilateral;    NASAL ENDOSCOPY N/A 8/8/2024    Procedure: ENDOSCOPY, NOSE;  Surgeon: Jono Russell MD;  Location: [facility] OR 2ND FLR;  Service: ENT;  Laterality: N/A;    PHACOEMULSIFICATION OF CATARACT Left  11/13/2022    Procedure: PHACOEMULSIFICATION, CATARACT;  Surgeon: Alia Mckeon MD;  [facility];  Service: Ophthalmology;  Laterality: Left;    PHACOEMULSIFICATION OF CATARACT Right 12/04/2022    Procedure: PHACOEMULSIFICATION, CATARACT;  Surgeon: Alia Mckeon MD;  [facility];  Service: Ophthalmology;  Laterality: Right;    SPINE SURGERY      TRANSESOPHAGEAL ECHOCARDIOGRAPHY N/A 3/22/2023    Procedure: ECHOCARDIOGRAM, TRANSESOPHAGEAL;  Surgeon: [facility] Diagnostic Provider;  Location: [facility] EP LAB;  Service: Anesthesiology;  Laterality: N/A;    TRANSESOPHAGEAL ECHOCARDIOGRAPHY N/A 4/11/2023    Procedure: ECHOCARDIOGRAM, TRANSESOPHAGEAL;  Surgeon: [facility] Diagnostic Provider;  Location: [facility] EP LAB;  Service: Anesthesiology;  Laterality: N/A;    VASECTOMY         Social History:  Tobacco Use: Medium Risk (7/27/2025)    Patient History     Smoking Tobacco Use: Former     Smokeless Tobacco Use: Never     Passive Exposure: Never       Alcohol Use: Not At Risk (2/25/2025)    AUDIT-C     Frequency of Alcohol Consumption: Never     Average Number of Drinks: Patient does not drink     Frequency of Binge Drinking: Never       OBJECTIVE:     Vital Signs Range:  BMI Readings from Last 1 Encounters:   07/27/25 23.85 kg/m²       Temp:  [36.1 °C (96.9 °F)-36.6 °C (97.8 °F)]   Pulse:  [64]   Resp:  [16-18]   BP: (146-159)/(57-62)   SpO2:  [98 %-99 %]        Significant Labs:        Component Value Date/Time    WBC 3.91 07/28/2025 0547    HGB 7.6 (L) 07/28/2025 0547    HGB 8.0 (L) 03/07/2025 0316    HCT 24.1 (L) 07/28/2025 0547    HCT 25.5 (L) 03/07/2025 0316    HCT 25 (L) 05/31/2024 1246     07/28/2025 0547     03/07/2025 0316     07/28/2025 0547     03/07/2025 0316    K 4.1 07/28/2025 0547    K 5.0 03/07/2025 0316     07/28/2025 0547     (H) 03/07/2025 0316    CO2 18 (L) 07/28/2025 0547    CO2 18 (L) 03/07/2025 0316    GLU 67 (L) 07/28/2025 0547    GLU 67 (L)  03/07/2025 0316    BUN 55 (H) 07/28/2025 0547    CREATININE 2.4 (H) 07/28/2025 0547    MG 1.7 03/07/2025 0316    PHOS 4.1 06/16/2025 0655    PHOS 2.8 03/07/2025 0316    CALCIUM 9.2 07/28/2025 0547    CALCIUM 9.3 03/07/2025 0316    ALBUMIN 3.2 (L) 07/27/2025 1512    ALBUMIN 2.6 (L) 03/07/2025 0316    PROT 7.4 07/27/2025 1512    PROT 5.8 (L) 03/07/2025 0316    ALKPHOS 92 07/27/2025 1512    ALKPHOS 72 03/07/2025 0316    BILITOT 0.5 07/27/2025 1512    BILITOT 0.8 03/07/2025 0316    AST 51 (H) 07/27/2025 1512    AST 17 03/07/2025 0316    ALT 41 07/27/2025 1512    ALT 6 (L) 03/07/2025 0316    INR 2.9 (H) 07/28/2025 0547    INR 3.2 (H) 03/18/2025 1405    INR 3.7 01/31/2022 0000    INR 2.0 (H) 03/15/2010 0954    HGBA1C 5.5 06/30/2025 0650    HGBA1C 5.1 12/13/2024 1252        Please see Results Review for additional labs.     Diagnostic Studies: No relevant studies.    EKG:   Results for orders placed or performed during the hospital encounter of 02/24/25   EKG 12-lead    Collection Time: 02/26/25 12:55 PM   Result Value Ref Range    QRS Duration 156 ms    [facility]HS QTC Calculation 506 ms    Narrative    Test Reason : R94.31,    Vent. Rate :  73 BPM     Atrial Rate :    BPM     P-R Int :    ms          QRS Dur : 156 ms      QT Int : 460 ms       P-R-T Axes :    -11 128 degrees    QTcB Int : 506 ms    Atrial fibrillation  Left bundle branch block  Abnormal ECG  When compared with ECG of 13-Dec-2024 12:30,  Left bundle branch block has replaced Nonspecific intraventricular block  Confirmed by Gareth Armas ([id number]) on 2/26/2025 1:07:59 PM    Referred By: AAAREFERRAL SELF           Confirmed By: Gareth Armas       ECHO:  See subjective, if available.      ASSESSMENT/PLAN:         Pre-op Assessment    I have reviewed the Patient Summary Reports.     I have reviewed the Nursing Notes. I have reviewed the NPO Status.   I have reviewed the Medications.     Review of Systems  Anesthesia Hx:  No problems with previous Anesthesia              Denies Family Hx of Anesthesia complications.    Denies Personal Hx of Anesthesia complications.                    Social:  No Alcohol Use, Former Smoker       Hematology/Oncology:    Oncology Normal    -- Anemia:                                  EENT/Dental:  EENT/Dental Normal           Cardiovascular:     Hypertension   CAD       CHF       ECG has been reviewed.      Coronary Artery Disease:          Hx of Myocardial Infarction     Congestive Heart Failure (CHF)                Hypertension     Atrial Fibrillation     Pulmonary:             Pulmonary Infection:  Pneumonia.     Renal/:  Chronic Renal Disease, CKD        Kidney Function/Disease             Hepatic/GI:     GERD         Gerd          Musculoskeletal:  Musculoskeletal Normal                Neurological:    Neuromuscular Disease,                                 Neuromuscular Disease   Endocrine:  Diabetes, type 2    Diabetes                      Dermatological:  Skin Normal    Psych:  Psychiatric Normal                    Physical Exam  General: Well nourished, Cooperative, Alert and Oriented    Airway:  Mallampati: III   Mouth Opening: Normal  TM Distance: Normal  Tongue: Normal  Neck ROM: Normal ROM    Dental:  Intact    Chest/Lungs:  Normal Respiratory Rate    Heart:  Rate: Normal        Anesthesia Plan  Type of Anesthesia, risks & benefits discussed:    Anesthesia Type: Gen Natural Airway, MAC, Gen ETT  Intra-op Monitoring Plan: Standard ASA Monitors  Post Op Pain Control Plan: multimodal analgesia and IV/PO Opioids PRN  Induction:  IV  Airway Plan: Direct and Video, Post-Induction  Informed Consent: Informed consent signed with the Patient and all parties understand the risks and agree with anesthesia plan.  All questions answered.   ASA Score: 3  Day of Surgery Review of History & Physical: H&P Update referred to the surgeon/provider.    Ready For Surgery From Anesthesia Perspective.     .           [1]   Patient Active Problem List  Diagnosis     Chronic anticoagulation    Hyperlipidemia associated with type 2 diabetes mellitus    History of colon resection    Renovascular hypertension    History of gout    Type 2 diabetes mellitus with stage 4 chronic kidney disease, without long-term current use of insulin    H/O mechanical aortic valve replacement    Atherosclerosis of native artery of extremity with intermittent claudication    Stage 3b chronic kidney disease    Type 2 diabetes mellitus with diabetic peripheral angiopathy without gangrene    Hyperkalemia    Epistaxis    Gastrointestinal hemorrhage associated with intestinal diverticulosis    Hx of colonic polyp    Hypertension associated with diabetes    Bilateral carpal tunnel syndrome    Numbness and tingling in both hands    Severe carpal tunnel syndrome of right wrist    Anemia    Acute blood loss anemia    Recurrent epistaxis    Supratherapeutic INR    Coronary artery disease involving native coronary artery of native heart without angina pectoris    Acute exacerbation of CHF (congestive heart failure)    Paroxysmal atrial fibrillation    Mitral insufficiency    Secondary hyperparathyroidism of renal origin    ACP (advance care planning)    Chronic kidney disease, stage 4 (severe)    Anemia of chronic renal failure    Hyperuricemia    Nephrolithiasis    History of epistaxis    Right lower lobe pneumonia   [2]   No current facility-administered medications on file prior to encounter.     Current Outpatient Medications on File Prior to Encounter   Medication Sig Dispense Refill    acetaminophen (TYLENOL) 500 MG tablet Take 500 mg by mouth daily as needed for Pain.      albuterol (VENTOLIN HFA) 90 mcg/actuation inhaler Inhale 2 puffs into the lungs every 6 (six) hours as needed for Wheezing. Rescue 18 g 3    allopurinoL (ZYLOPRIM) 100 MG tablet Take 1 tablet (100 mg total) by mouth once daily. 90 tablet 3    bumetanide (BUMEX) 1 MG tablet TAKE 2 TABLETS EVERY OTHER DAY 90 tablet 3    ferrous  gluconate (FERGON) 324 MG tablet Take 1 tablet (324 mg total) by mouth daily with breakfast. 90 tablet 3    isosorbide mononitrate (IMDUR) 60 MG 24 hr tablet Take 1 tablet (60 mg total) by mouth every evening. 90 tablet 3    ketoconazole (NIZORAL) 2 % cream Apply topically 2 (two) times daily. For dry skin around nose and ears 60 g 1    omega-3 fatty acids/fish oil (FISH OIL-OMEGA-3 FATTY ACIDS) 300-1,000 mg capsule Take 1 capsule by mouth once daily.      oxymetazoline (AFRIN) 0.05 % nasal spray 2 sprays by Nasal route as needed (nose bleeds). 30 mL 0    QUEtiapine (SEROQUEL) 50 MG tablet Take 1 tablet (50 mg total) by mouth every evening. 30 tablet 11    rosuvastatin (CRESTOR) 40 MG Tab Take 1 tablet (40 mg total) by mouth every evening. 90 tablet 3    sodium bicarbonate 650 MG tablet Take 1 tablet (650 mg total) by mouth once daily. (Patient not taking: Reported on 6/10/2025) 30 tablet 11    sodium chloride (SALINE NASAL) 0.65 % nasal spray Use 2 sprays by Nasal route every 4 hours. Apply into nasal cavities daily with nightly application of vaseline/aquaphor to anterior nares. Keep head of bed elevated. 60 mL 3    warfarin (COUMADIN) 5 MG tablet TAKE 2.5MG MONDAY, WEDNESDAY, FRIDAY AND THEN TAKE 5MG ALL OTHER DAYS OR AS INSTRUCTED BY COUMADIN CLINIC 30 tablet 11

## 2025-07-28 NOTE — SUBJECTIVE & OBJECTIVE
Interval History: Bilateral merocels in place, patient denies further bleeding overnight. Resting comfortably in bed this AM.     Medications:  Continuous Infusions:  Scheduled Meds:   allopurinoL  100 mg Oral Daily    amoxicillin-clavulanate 250-125mg  2 tablet Oral Q12H    atorvastatin  40 mg Oral Daily    ferrous gluconate  324 mg Oral Daily with breakfast    isosorbide mononitrate  60 mg Oral QHS    QUEtiapine  50 mg Oral QHS    sodium chloride  1 spray Each Nostril Q4H     PRN Meds:  Current Facility-Administered Medications:     0.9%  NaCl infusion (for blood administration), , Intravenous, Q24H PRN    acetaminophen, 1,000 mg, Oral, Q8H PRN    acetaminophen, 650 mg, Oral, Q4H PRN    dextrose 50%, 12.5 g, Intravenous, PRN    dextrose 50%, 25 g, Intravenous, PRN    glucagon (human recombinant), 1 mg, Intramuscular, PRN    glucose, 16 g, Oral, PRN    glucose, 24 g, Oral, PRN    HYDROmorphone, 1 mg, Intravenous, Q4H PRN    naloxone, 0.02 mg, Intravenous, PRN    ondansetron, 4 mg, Intravenous, Q8H PRN    oxymetazoline, 2 spray, Each Nostril, BID PRN    sodium chloride, 1 spray, Each Nostril, PRN    sodium chloride 0.9%, 10 mL, Intravenous, Q12H PRN     Review of patient's allergies indicates:   Allergen Reactions    Lisinopril     Losartan      Intolerance- elevates potassium level     Objective:     Vital Signs (24h Range):  Temp:  [96.9 °F (36.1 °C)-98.5 °F (36.9 °C)] 96.9 °F (36.1 °C)  Pulse:  [55-84] 64  Resp:  [16-20] 16  SpO2:  [96 %-100 %] 99 %  BP: (122-196)/(48-77) 159/62       Lines/Drains/Airways       Peripheral Intravenous Line  Duration             Peripheral IV Single Lumen 07/27/25 1510 20 G Anterior;Distal;Left Upper Arm 1 day    Peripheral IV Single Lumen 07/27/25 1618 20 G Posterior;Right Forearm <1 day                     Physical Exam   NAD  Awake and alert  Head atraumatic   Bilateral merocels in place  Clot in posterior oropharynx, no active bleeding   Normal WOB, no stridor or  abner    Significant Labs:  CBC:   Recent Labs   Lab 07/28/25  0547   WBC 3.91   RBC 2.53*   HGB 7.6*   HCT 24.1*      MCV 95   MCH 30.0   MCHC 31.5*     CMP:   Recent Labs   Lab 07/27/25  1512 07/28/25  0547    67*   CALCIUM 9.7 9.2   ALBUMIN 3.2*  --    PROT 7.4  --     136   K 4.4 4.1   CO2 23 18*    109   BUN 47* 55*   CREATININE 2.5* 2.4*   ALKPHOS 92  --    ALT 41  --    AST 51*  --    BILITOT 0.5  --        Significant Diagnostics:  None

## 2025-07-28 NOTE — CONSULTS
[facility] Dept  Otorhinolaryngology-Head & Neck Surgery  Consult Note    Patient Name: Dariel Urbina  MRN: [id number]  Code Status: Full Code  Admission Date: 7/27/2025  Hospital Length of Stay: 0 days  Attending Physician: Zaid Faust MD  Primary Care Provider: Devon Langston Jr., MD    Patient information was obtained from patient, ER records, and primary team.     Inpatient consult to ENT  Consult performed by: Hortencia Delgado MD  Consult ordered by: Brandy Archer MD        Subjective:     Chief Complaint/Reason for Admission: epistaxis    History of Present Illness: 85 yo M well known to the ENT service is presenting with left sided epistaxis. Has undergone multiple cauterizations and an SPA ligation with Dr. Russell. Reports his nosebleed started this morning and didn't stop with pressure. He is admitted to hospital medicine and currently receiving 1u pRBC for Hb of 7.1.    Medications:  Continuous Infusions:  Scheduled Meds:   [START ON 7/28/2025] allopurinoL  100 mg Oral Daily    [START ON 7/28/2025] atorvastatin  40 mg Oral Daily    [START ON 7/28/2025] ferrous gluconate  324 mg Oral Daily with breakfast    isosorbide mononitrate  60 mg Oral QHS    QUEtiapine  50 mg Oral QHS     PRN Meds:  Current Facility-Administered Medications:     0.9%  NaCl infusion (for blood administration), , Intravenous, Q24H PRN    acetaminophen, 1,000 mg, Oral, Q8H PRN    acetaminophen, 650 mg, Oral, Q4H PRN    dextrose 50%, 12.5 g, Intravenous, PRN    dextrose 50%, 25 g, Intravenous, PRN    glucagon (human recombinant), 1 mg, Intramuscular, PRN    glucose, 16 g, Oral, PRN    glucose, 24 g, Oral, PRN    HYDROmorphone, 1 mg, Intravenous, Q4H PRN    naloxone, 0.02 mg, Intravenous, PRN    ondansetron, 4 mg, Intravenous, Q8H PRN    sodium chloride, 1 spray, Each Nostril, PRN    sodium chloride 0.9%, 10 mL, Intravenous, Q12H PRN     No current facility-administered medications on file prior to encounter.     Current  Outpatient Medications on File Prior to Encounter   Medication Sig    acetaminophen (TYLENOL) 500 MG tablet Take 500 mg by mouth daily as needed for Pain.    albuterol (VENTOLIN HFA) 90 mcg/actuation inhaler Inhale 2 puffs into the lungs every 6 (six) hours as needed for Wheezing. Rescue    allopurinoL (ZYLOPRIM) 100 MG tablet Take 1 tablet (100 mg total) by mouth once daily.    bumetanide (BUMEX) 1 MG tablet TAKE 2 TABLETS EVERY OTHER DAY    ferrous gluconate (FERGON) 324 MG tablet Take 1 tablet (324 mg total) by mouth daily with breakfast.    isosorbide mononitrate (IMDUR) 60 MG 24 hr tablet Take 1 tablet (60 mg total) by mouth every evening.    ketoconazole (NIZORAL) 2 % cream Apply topically 2 (two) times daily. For dry skin around nose and ears    omega-3 fatty acids/fish oil (FISH OIL-OMEGA-3 FATTY ACIDS) 300-1,000 mg capsule Take 1 capsule by mouth once daily.    oxymetazoline (AFRIN) 0.05 % nasal spray 2 sprays by Nasal route as needed (nose bleeds).    QUEtiapine (SEROQUEL) 50 MG tablet Take 1 tablet (50 mg total) by mouth every evening.    rosuvastatin (CRESTOR) 40 MG Tab Take 1 tablet (40 mg total) by mouth every evening.    sodium bicarbonate 650 MG tablet Take 1 tablet (650 mg total) by mouth once daily. (Patient not taking: Reported on 6/10/2025)    sodium chloride (SALINE NASAL) 0.65 % nasal spray Use 2 sprays by Nasal route every 4 hours. Apply into nasal cavities daily with nightly application of vaseline/aquaphor to anterior nares. Keep head of bed elevated.    warfarin (COUMADIN) 5 MG tablet TAKE 2.5MG MONDAY, WEDNESDAY, FRIDAY AND THEN TAKE 5MG ALL OTHER DAYS OR AS INSTRUCTED BY COUMADIN CLINIC       Review of patient's allergies indicates:   Allergen Reactions    Lisinopril     Losartan      Intolerance- elevates potassium level       Past Medical History:   Diagnosis Date    Anemia of chronic renal failure, stage 3 (moderate) 05/27/2015    Anticoagulant long-term use     Atherosclerosis of  coronary artery bypass graft of native heart without angina pectoris 09/11/2012    3-27-18 [facility] Two vessel coronary artery disease.   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    Bilateral carotid artery disease 02/09/2017    Bleeding from the nose     Bleeding nose 03/21/2018    Cancer     Cataract     CHF (congestive heart failure)     CKD (chronic kidney disease) stage 3, GFR 30-59 ml/min 05/27/2015    Claudication of left lower extremity 09/17/2014    Colon polyp     Encounter for blood transfusion     Gastroesophageal reflux disease without esophagitis 03/19/2018    Gastrointestinal hemorrhage associated with intestinal diverticulosis 04/01/2018    Glaucoma     H/O mechanical aortic valve replacement 09/17/2014    History of gout 09/26/2012    Hyperparathyroidism due to renal insufficiency 07/27/2015    Internal hemorrhoid 04/03/2018    Long term current use of anticoagulant therapy 09/26/2012    Mechanical heart valve present     Metabolic acidosis with normal anion gap and bicarbonate losses 03/20/2018    Mixed hyperlipidemia 09/26/2012    NSTEMI (non-ST elevated myocardial infarction) 03/21/2018    Obesity, diabetes, and hypertension syndrome 02/23/2016    Paroxysmal atrial fibrillation 02/06/2023    PVD (peripheral vascular disease) 09/11/2012    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     Past Surgical History:   Procedure Laterality Date    BACK SURGERY      CARDIAC CATHETERIZATION      CARDIAC VALVE REPLACEMENT      CARDIAC VALVE SURGERY      CARPAL TUNNEL RELEASE Right 05/19/2020    Procedure: RELEASE, CARPAL TUNNEL;  Surgeon: Rupesh Norris Jr., MD;   Location: [facility] OR;  Service: Plastics;  Laterality: Right;    CATARACT EXTRACTION Left 11/13/2022        COLON SURGERY      COLONOSCOPY N/A 03/31/2017    Procedure: COLONOSCOPY;  Surgeon: Bruno Raymond MD;  Location: [facility] ENDO (4TH FLR);  Service: Endoscopy;  Laterality: N/A;  Patient's wife requesting date.    COLONOSCOPY N/A 04/03/2018    Procedure: COLONOSCOPY;  Surgeon: Bonifacio Pelletier MD;  Location: [facility] ENDO (2ND FLR);  Service: Endoscopy;  Laterality: N/A;    COLONOSCOPY N/A 08/13/2018    Procedure: COLONOSCOPY;  Surgeon: Kam Barba MD;  Location: [facility] ENDO (2ND FLR);  Service: Endoscopy;  Laterality: N/A;  2nd floor: PA pressure 49; hx of moderate-severe valve disease     per Coumadin clinic-Patient can hold 5 days with lovenox bridge       ok to schedule per Katarina    CONTROL OF EPISTAXIS, POSTERIOR, USING NASAL PACKING OR CAUTERIZATION Bilateral 6/18/2024    Procedure: CONTROL OF EPISTAXIS, POSTERIOR, USING NASAL PACKING OR CAUTERIZATION;  Surgeon: Jono Russell MD;  Location: [facility] 2ND FLR;  Service: ENT;  Laterality: Bilateral;    CONTROL OF EPISTAXIS, POSTERIOR, USING NASAL PACKING OR CAUTERIZATION Bilateral 8/8/2024    Procedure: CONTROL OF EPISTAXIS, POSTERIOR, USING NASAL PACKING OR CAUTERIZATION;  Surgeon: Jono Russell MD;  [facility];  Service: ENT;  Laterality: Bilateral;  suction bovie, nasopore, endoscopes    CONTROL OF EPISTAXIS, POSTERIOR, USING NASAL PACKING OR CAUTERIZATION Bilateral 12/30/2024    Procedure: CONTROL OF EPISTAXIS, POSTERIOR, USING NASAL PACKING OR CAUTERIZATION;  Surgeon: Jono Russell MD;  [facility];  Service: ENT;  Laterality: Bilateral;    CORONARY ANGIOGRAPHY N/A 10/04/2021    Procedure: Left heart cath +/- peripheral angiogram;  Surgeon: Jose Ruiz MD;  Location: [facility] CATH LAB;  Service: Cardiology;  Laterality: N/A;    CORONARY ANGIOGRAPHY N/A 3/2/2023    Procedure: Angiogram, Coronary;  Surgeon: Devon  JILLIAN Garnica MD;  Location: [facility] CATH LAB;  Service: Cardiology;  Laterality: N/A;    CORONARY ANGIOPLASTY      CORONARY ARTERY BYPASS GRAFT      CORONARY BYPASS GRAFT ANGIOGRAPHY  10/04/2021    Procedure: Bypass graft study;  Surgeon: Jose Ruiz MD;  Location: [facility] CATH LAB;  Service: Cardiology;;    CORONARY BYPASS GRAFT ANGIOGRAPHY  3/2/2023    Procedure: Bypass graft study;  Surgeon: Devon Garnica MD;  Location: [facility] CATH LAB;  Service: Cardiology;;    ECHOCARDIOGRAM,TRANSESOPHAGEAL N/A 4/14/2023    Procedure: Transesophageal echo (KIRSTEN) intra-procedure log documentation;  Surgeon: [facility] Diagnostic Provider;  Location: [facility] EP LAB;  Service: Cardiology;  Laterality: N/A;    ENDOSCOPIC NASAL CAUTERIZATION Bilateral 11/3/2023    Procedure: CAUTERIZATION, NOSE, ENDOSCOPIC;  Surgeon: Jono Russell MD;  Location: [facility] OR [facility] FLR;  Service: ENT;  Laterality: Bilateral;    ENDOSCOPIC NASAL CAUTERIZATION N/A 12/18/2023    Procedure: CAUTERIZATION, NOSE, ENDOSCOPIC;  Surgeon: Jono Russell MD;  Location: [facility] OR [facility] FLR;  Service: ENT;  Laterality: N/A;    ENDOSCOPIC NASAL CAUTERIZATION N/A 2/26/2025    Procedure: CAUTERIZATION, NOSE, ENDOSCOPIC;  Surgeon: Jono Russell MD;  Location: [facility] OR [facility] FLR;  Service: ENT;  Laterality: N/A;    ENDOSCOPIC NASAL CAUTERIZATION Bilateral 3/6/2025    Procedure: CAUTERIZATION, NOSE, ENDOSCOPIC;  Surgeon: Jono Russell MD;  Location: [facility] OR [facility] FLR;  Service: ENT;  Laterality: Bilateral;  If he has an outpatient case request for same day, use the inpatient one instead.    EYE SURGERY      FESS, WITH IMAGING GUIDANCE Left 2/28/2024    Procedure: FESS, WITH IMAGING GUIDANCE;  Surgeon: Jono Russell MD;  Location: [facility] OR [facility] FLR;  Service: ENT;  Laterality: Left;  Scan loaded ENT [employer]. CL    FLUOROSCOPY Bilateral 10/29/2024    Procedure: Fluoroscopy;  Surgeon: Sameer Espitia MD;  Location: [facility] CATH LAB/EP;  Service: Cardiology;  Laterality: Bilateral;   fluoroscopy of the mechanical aortic valve    FUNCTIONAL ENDOSCOPIC SINUS SURGERY (FESS) Bilateral 6/14/2023    Procedure: FESS (FUNCTIONAL ENDOSCOPIC SINUS SURGERY);  Surgeon: Jono Russell MD;  Location: [facility] OR 2ND FLR;  Service: ENT;  Laterality: Bilateral;    HERNIA REPAIR      INTRAOCULAR PROSTHESES INSERTION Left 11/13/2022    Procedure: INSERTION, IOL PROSTHESIS;  Surgeon: Alia Mckeon MD;  Location: [facility] OR 1ST FLR;  Service: Ophthalmology;  Laterality: Left;    INTRAOCULAR PROSTHESES INSERTION Right 12/04/2022    Procedure: INSERTION, IOL PROSTHESIS;  Surgeon: Alia Mckeon MD;  Location: [facility] OR 1ST FLR;  Service: Ophthalmology;  Laterality: Right;    LIGATION, ARTERY, ETHMOIDAL Left 2/28/2024    Procedure: LIGATION, ARTERY, ETHMOIDAL;  Surgeon: Jono Russell MD;  Location: [facility] OR 2ND FLR;  Service: ENT;  Laterality: Left;    LIGATION, ARTERY, SPHENOPALATINE, ENDOSCOPIC Bilateral 6/14/2023    Procedure: LIGATION, ARTERY, SPHENOPALATINE, ENDOSCOPIC;  Surgeon: Jono Russell MD;  Location: [facility] OR 2ND FLR;  Service: ENT;  Laterality: Bilateral;    NASAL ENDOSCOPY N/A 8/8/2024    Procedure: ENDOSCOPY, NOSE;  Surgeon: Jono Russell MD;  Location: [facility] OR 2ND FLR;  Service: ENT;  Laterality: N/A;    PHACOEMULSIFICATION OF CATARACT Left 11/13/2022    Procedure: PHACOEMULSIFICATION, CATARACT;  Surgeon: Alia Mckeon MD;  [facility];  Service: Ophthalmology;  Laterality: Left;    PHACOEMULSIFICATION OF CATARACT Right 12/04/2022    Procedure: PHACOEMULSIFICATION, CATARACT;  Surgeon: Alia Mckeon MD;  [facility];  Service: Ophthalmology;  Laterality: Right;    SPINE SURGERY      TRANSESOPHAGEAL ECHOCARDIOGRAPHY N/A 3/22/2023    Procedure: ECHOCARDIOGRAM, TRANSESOPHAGEAL;  Surgeon: Zenia Diagnostic Provider;  Location: [facility] EP LAB;  Service: Anesthesiology;  Laterality: N/A;    TRANSESOPHAGEAL ECHOCARDIOGRAPHY N/A 4/11/2023    Procedure: ECHOCARDIOGRAM,  TRANSESOPHAGEAL;  [facility];  Location: [facility] EP LAB;  Service: Anesthesiology;  Laterality: N/A;    VASECTOMY       Family History       Problem Relation (Age of Onset)    Alcohol abuse Father    Diabetes Brother    Heart disease Mother, Father, Brother, Sister    Heart failure Mother, Father, Brother    No Known Problems Sister, Maternal Grandmother, Maternal Grandfather, Paternal Grandmother, Paternal Grandfather, Maternal Aunt, Maternal Uncle, Paternal Aunt, Paternal Uncle          Tobacco Use    Smoking status: Former     Current packs/day: 0.00     Average packs/day: 1 pack/day for 20.0 years (20.0 ttl pk-yrs)     Types: Cigarettes     Start date: 1960     Quit date: 1980     Years since quittin.9     Passive exposure: Never    Smokeless tobacco: Never   Vaping Use    Vaping status: Never Used   Substance and Sexual Activity    Alcohol use: No    Drug use: No    Sexual activity: Not Currently     Partners: Female     Review of Systems  ROS per HPI    Objective:     Vital Signs (Most Recent):  Temp: 98.4 °F (36.9 °C) (25 1730)  Pulse: (!) 55 (25)  Resp: 20 (25)  BP: (!) 177/74 (25 1900)  SpO2: 99 % (25) Vital Signs (24h Range):  Temp:  [97.1 °F (36.2 °C)-98.4 °F (36.9 °C)] 98.4 °F (36.9 °C)  Pulse:  [55-73] 55  Resp:  [20] 20  SpO2:  [98 %-100 %] 99 %  BP: (144-196)/(65-77) 177/74     Weight: 65 kg (143 lb 4.8 oz)  Body mass index is 23.85 kg/m².    Date 25 07 - 25 0659   Shift 7777-1210 9859-5929 2330-8961 24 Hour Total   INTAKE   Blood  397.5  397.5   Shift Total(mL/kg)  397.5(6.1)  397.5(6.1)   OUTPUT   Shift Total(mL/kg)       Weight (kg) 65 65 65 65        Physical Exam   NAD  Awake and alert  Head atraumatic   Anterior rhinoscopy with anterior septal perforation, active oozing from left septum just posterior to perforation and left inferior turbinate  Clot in posterior oropharynx  Normal WOB, no stridor or  abner    PROCEDURE: Flexible Nasal Endoscopy Exam  The risks, benefits, and alternatives to the procedure were explained. Patient/family  agreed to procedure; verbal consent obtained. The scope was inserted into the nasal cavity. Examination of the nasal cavity was performed; all were closely visualized to confirm presence or absence of erythema, edema, or structural lesions.     Pertinent exam findings include the following:  - Nasal cavity: anterior septal perforation; clot in left nasal cavity suctioned, appears to be active oozing from left anterior septum and left inferior turbinate; right nasal cavity appears hemostatic    The scope was removed. The patient tolerated the procedure well without complications.    PROCEDURE: Control of epistaxis -- Nasal packing  After obtaining consent from the patient/family, anterior rhinoscopy performed with debris suctioned from the nasal cavities. An active bleed was identified at the left anterior nasal septum. Bilateral merocels were placed to stop the bleed given some of the bleeding from left was going to the right nasal cavity via the septal perforation. The pack was sprayed with oxymetazoline. At the conclusion of the procedure, no active bleeding around the nasal pack was seen. Old clot suctioned from posterior oropharynx.     Significant Labs:  CBC:   Recent Labs   Lab 07/27/25  1512   WBC 5.49   RBC 2.31*   HGB 7.1*   HCT 22.5*      MCV 97   MCH 30.7   MCHC 31.6*     CMP:   Recent Labs   Lab 07/27/25  1512      CALCIUM 9.7   ALBUMIN 3.2*   PROT 7.4      K 4.4   CO2 23      BUN 47*   CREATININE 2.5*   ALKPHOS 92   ALT 41   AST 51*   BILITOT 0.5       Significant Diagnostics:  None    Assessment/Plan:     Recurrent epistaxis  85 yo male with heart valve on Warfarin and recurrent epistaxis presents to ER due to left sided epistaxis. Currently packed with bilateral merocels. Hb 7.1 in ED, receiving 1u pRBC and admitted to medicine.     -  Maintain bilateral merocel  - Please place patient on anti-staph antibiotics while packing is in place  - Afrin to bilateral nares q8h for the next 48h  - May place mustache dressing under nose for trickling, ok if dressing becomes saturated with some red/brown drainage  - Nasal saline q4h while packing in place, even on side with nasal packing. Can continue nasal saline sprays to keep mucosa moist to prevent future bleeding.  - Keep head of bed elevated  - Apply Afrin to affected nasal cavity if epistaxis recurs, please see detailed instructions below   - Please call ENT with questions  - Remainder of care per primary team         VTE Risk Mitigation (From admission, onward)           Ordered     IP VTE HIGH RISK PATIENT  Once         07/27/25 1640     Place sequential compression device  Until discontinued         07/27/25 1640     Reason for No Pharmacological VTE Prophylaxis  Once        Question:  Reasons:  Answer:  Active Bleeding    07/27/25 1640                    Thank you for your consult. I will follow-up with patient. Please contact us if you have any additional questions.    Hortencia Delgado MD  Otorhinolaryngology-Head & Neck Surgery  [facility] Emergency Dept

## 2025-07-28 NOTE — SUBJECTIVE & OBJECTIVE
Medications:  Continuous Infusions:  Scheduled Meds:   [START ON 7/28/2025] allopurinoL  100 mg Oral Daily    [START ON 7/28/2025] atorvastatin  40 mg Oral Daily    [START ON 7/28/2025] ferrous gluconate  324 mg Oral Daily with breakfast    isosorbide mononitrate  60 mg Oral QHS    QUEtiapine  50 mg Oral QHS     PRN Meds:  Current Facility-Administered Medications:     0.9%  NaCl infusion (for blood administration), , Intravenous, Q24H PRN    acetaminophen, 1,000 mg, Oral, Q8H PRN    acetaminophen, 650 mg, Oral, Q4H PRN    dextrose 50%, 12.5 g, Intravenous, PRN    dextrose 50%, 25 g, Intravenous, PRN    glucagon (human recombinant), 1 mg, Intramuscular, PRN    glucose, 16 g, Oral, PRN    glucose, 24 g, Oral, PRN    HYDROmorphone, 1 mg, Intravenous, Q4H PRN    naloxone, 0.02 mg, Intravenous, PRN    ondansetron, 4 mg, Intravenous, Q8H PRN    sodium chloride, 1 spray, Each Nostril, PRN    sodium chloride 0.9%, 10 mL, Intravenous, Q12H PRN     No current facility-administered medications on file prior to encounter.     Current Outpatient Medications on File Prior to Encounter   Medication Sig    acetaminophen (TYLENOL) 500 MG tablet Take 500 mg by mouth daily as needed for Pain.    albuterol (VENTOLIN HFA) 90 mcg/actuation inhaler Inhale 2 puffs into the lungs every 6 (six) hours as needed for Wheezing. Rescue    allopurinoL (ZYLOPRIM) 100 MG tablet Take 1 tablet (100 mg total) by mouth once daily.    bumetanide (BUMEX) 1 MG tablet TAKE 2 TABLETS EVERY OTHER DAY    ferrous gluconate (FERGON) 324 MG tablet Take 1 tablet (324 mg total) by mouth daily with breakfast.    isosorbide mononitrate (IMDUR) 60 MG 24 hr tablet Take 1 tablet (60 mg total) by mouth every evening.    ketoconazole (NIZORAL) 2 % cream Apply topically 2 (two) times daily. For dry skin around nose and ears    omega-3 fatty acids/fish oil (FISH OIL-OMEGA-3 FATTY ACIDS) 300-1,000 mg capsule Take 1 capsule by mouth once daily.    oxymetazoline (AFRIN) 0.05  % nasal spray 2 sprays by Nasal route as needed (nose bleeds).    QUEtiapine (SEROQUEL) 50 MG tablet Take 1 tablet (50 mg total) by mouth every evening.    rosuvastatin (CRESTOR) 40 MG Tab Take 1 tablet (40 mg total) by mouth every evening.    sodium bicarbonate 650 MG tablet Take 1 tablet (650 mg total) by mouth once daily. (Patient not taking: Reported on 6/10/2025)    sodium chloride (SALINE NASAL) 0.65 % nasal spray Use 2 sprays by Nasal route every 4 hours. Apply into nasal cavities daily with nightly application of vaseline/aquaphor to anterior nares. Keep head of bed elevated.    warfarin (COUMADIN) 5 MG tablet TAKE 2.5MG MONDAY, WEDNESDAY, FRIDAY AND THEN TAKE 5MG ALL OTHER DAYS OR AS INSTRUCTED BY COUMADIN CLINIC       Review of patient's allergies indicates:   Allergen Reactions    Lisinopril     Losartan      Intolerance- elevates potassium level       Past Medical History:   Diagnosis Date    Anemia of chronic renal failure, stage 3 (moderate) 05/27/2015    Anticoagulant long-term use     Atherosclerosis of coronary artery bypass graft of native heart without angina pectoris 09/11/2012    3-27-18 [facility] Two vessel coronary artery disease.   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    Bilateral carotid artery disease 02/09/2017    Bleeding from the nose     Bleeding nose 03/21/2018    Cancer     Cataract     CHF (congestive heart failure)     CKD (chronic kidney disease) stage 3, GFR 30-59 ml/min 05/27/2015    Claudication of left lower extremity 09/17/2014    Colon polyp     Encounter for blood transfusion     Gastroesophageal reflux disease without esophagitis 03/19/2018    Gastrointestinal hemorrhage associated with intestinal diverticulosis 04/01/2018    Glaucoma     H/O mechanical aortic valve replacement 09/17/2014    History of gout 09/26/2012    Hyperparathyroidism due to renal insufficiency 07/27/2015    Internal hemorrhoid 04/03/2018    Long term current use of anticoagulant  therapy 09/26/2012    Mechanical heart valve present     Metabolic acidosis with normal anion gap and bicarbonate losses 03/20/2018    Mixed hyperlipidemia 09/26/2012    NSTEMI (non-ST elevated myocardial infarction) 03/21/2018    Obesity, diabetes, and hypertension syndrome 02/23/2016    Paroxysmal atrial fibrillation 02/06/2023    PVD (peripheral vascular disease) 09/11/2012    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     Past Surgical History:   Procedure Laterality Date    BACK SURGERY      CARDIAC CATHETERIZATION      CARDIAC VALVE REPLACEMENT      CARDIAC VALVE SURGERY      CARPAL TUNNEL RELEASE Right 05/19/2020    Procedure: RELEASE, CARPAL TUNNEL;  Surgeon: Rupesh Norris Jr., MD;  Location: [facility];  Service: Plastics;  Laterality: Right;    CATARACT EXTRACTION Left 11/13/2022        COLON SURGERY      COLONOSCOPY N/A 03/31/2017    Procedure: COLONOSCOPY;  Surgeon: Bruno Raymond MD;  Location: [facility] (4TH FLR);  Service: Endoscopy;  Laterality: N/A;  Patient's wife requesting date.    COLONOSCOPY N/A 04/03/2018    Procedure: COLONOSCOPY;  Surgeon: Bonifacio Pelletier MD;  Location: [facility] ENDO (2ND FLR);  Service: Endoscopy;  Laterality: N/A;    COLONOSCOPY N/A 08/13/2018    Procedure: COLONOSCOPY;  Surgeon: Kam Barba MD;  Location: [facility] ENDO (2ND FLR);  Service: Endoscopy;  Laterality: N/A;  2nd floor: PA pressure 49; hx of moderate-severe valve disease     per Coumadin clinic-Patient can hold 5 days with lovenox bridge       ok to schedule per Katarina    CONTROL OF EPISTAXIS, POSTERIOR, USING NASAL PACKING OR CAUTERIZATION Bilateral 6/18/2024    Procedure: CONTROL  OF EPISTAXIS, POSTERIOR, USING NASAL PACKING OR CAUTERIZATION;  Surgeon: Jono Russell MD;  Location: [facility] OR [facility] FLR;  Service: ENT;  Laterality: Bilateral;    CONTROL OF EPISTAXIS, POSTERIOR, USING NASAL PACKING OR CAUTERIZATION Bilateral 8/8/2024    Procedure: CONTROL OF EPISTAXIS, POSTERIOR, USING NASAL PACKING OR CAUTERIZATION;  Surgeon: Jono Russell MD;  Location: [facility] OR [facility] FLR;  Service: ENT;  Laterality: Bilateral;  suction bovie, nasopore, endoscopes    CONTROL OF EPISTAXIS, POSTERIOR, USING NASAL PACKING OR CAUTERIZATION Bilateral 12/30/2024    Procedure: CONTROL OF EPISTAXIS, POSTERIOR, USING NASAL PACKING OR CAUTERIZATION;  Surgeon: Jono Russell MD;  [facility];  Service: ENT;  Laterality: Bilateral;    CORONARY ANGIOGRAPHY N/A 10/04/2021    Procedure: Left heart cath +/- peripheral angiogram;  Surgeon: Jose Ruiz MD;  Location: [facility] CATH LAB;  Service: Cardiology;  Laterality: N/A;    CORONARY ANGIOGRAPHY N/A 3/2/2023    Procedure: Angiogram, Coronary;  Surgeon: Devon Garnica MD;  Location: [facility] CATH LAB;  Service: Cardiology;  Laterality: N/A;    CORONARY ANGIOPLASTY      CORONARY ARTERY BYPASS GRAFT      CORONARY BYPASS GRAFT ANGIOGRAPHY  10/04/2021    Procedure: Bypass graft study;  Surgeon: Jose Ruiz MD;  Location: [facility] CATH LAB;  Service: Cardiology;;    CORONARY BYPASS GRAFT ANGIOGRAPHY  3/2/2023    Procedure: Bypass graft study;  Surgeon: Devon Garnica MD;  Location: [facility] CATH LAB;  Service: Cardiology;;    ECHOCARDIOGRAM,TRANSESOPHAGEAL N/A 4/14/2023    Procedure: Transesophageal echo (KIRSTEN) intra-procedure log documentation;  Surgeon: [facility] Diagnostic Provider;  Location: [facility] EP LAB;  Service: Cardiology;  Laterality: N/A;    ENDOSCOPIC NASAL CAUTERIZATION Bilateral 11/3/2023    Procedure: CAUTERIZATION, NOSE, ENDOSCOPIC;  Surgeon: Jono Russell MD;  [facility];  Service: ENT;  Laterality: Bilateral;    ENDOSCOPIC NASAL  CAUTERIZATION N/A 12/18/2023    Procedure: CAUTERIZATION, NOSE, ENDOSCOPIC;  Surgeon: Jono Russell MD;  Location: [facility] OR 2ND FLR;  Service: ENT;  Laterality: N/A;    ENDOSCOPIC NASAL CAUTERIZATION N/A 2/26/2025    Procedure: CAUTERIZATION, NOSE, ENDOSCOPIC;  Surgeon: Jono Russell MD;  Location: [facility] OR 2ND FLR;  Service: ENT;  Laterality: N/A;    ENDOSCOPIC NASAL CAUTERIZATION Bilateral 3/6/2025    Procedure: CAUTERIZATION, NOSE, ENDOSCOPIC;  Surgeon: Jono Russell MD;  Location: [facility] OR 2ND FLR;  Service: ENT;  Laterality: Bilateral;  If he has an outpatient case request for same day, use the inpatient one instead.    EYE SURGERY      FESS, WITH IMAGING GUIDANCE Left 2/28/2024    Procedure: FESS, WITH IMAGING GUIDANCE;  Surgeon: Jono Russell MD;  Location: [facility] OR 2ND FLR;  Service: ENT;  Laterality: Left;  Scan loaded ENT [employer]tr[employer]. CL    FLUOROSCOPY Bilateral 10/29/2024    Procedure: Fluoroscopy;  Surgeon: Sameer Espitia MD;  Location: [facility] CATH LAB/EP;  Service: Cardiology;  Laterality: Bilateral;  fluoroscopy of the mechanical aortic valve    FUNCTIONAL ENDOSCOPIC SINUS SURGERY (FESS) Bilateral 6/14/2023    Procedure: FESS (FUNCTIONAL ENDOSCOPIC SINUS SURGERY);  Surgeon: Jono Russell MD;  Location: [facility] OR 2ND FLR;  Service: ENT;  Laterality: Bilateral;    HERNIA REPAIR      INTRAOCULAR PROSTHESES INSERTION Left 11/13/2022    Procedure: INSERTION, IOL PROSTHESIS;  Surgeon: Alia Mckeon MD;  Location: [facility] OR 1ST FLR;  Service: Ophthalmology;  Laterality: Left;    INTRAOCULAR PROSTHESES INSERTION Right 12/04/2022    Procedure: INSERTION, IOL PROSTHESIS;  Surgeon: Alia Mckeon MD;  Location: [facility] OR 1ST FLR;  Service: Ophthalmology;  Laterality: Right;    LIGATION, ARTERY, ETHMOIDAL Left 2/28/2024    Procedure: LIGATION, ARTERY, ETHMOIDAL;  Surgeon: Jono Russell MD;  Location: [facility] OR 2ND FLR;  Service: ENT;  Laterality: Left;    LIGATION, ARTERY, SPHENOPALATINE, ENDOSCOPIC  Bilateral 2023    Procedure: LIGATION, ARTERY, SPHENOPALATINE, ENDOSCOPIC;  Surgeon: Jono Russell MD;  Location: [facility] OR 2ND FLR;  Service: ENT;  Laterality: Bilateral;    NASAL ENDOSCOPY N/A 2024    Procedure: ENDOSCOPY, NOSE;  Surgeon: Jono Russell MD;  Location: [facility] OR 2ND FLR;  Service: ENT;  Laterality: N/A;    PHACOEMULSIFICATION OF CATARACT Left 2022    Procedure: PHACOEMULSIFICATION, CATARACT;  Surgeon: Alia Mckeon MD;  Location: [facility] OR 1ST FLR;  Service: Ophthalmology;  Laterality: Left;    PHACOEMULSIFICATION OF CATARACT Right 2022    Procedure: PHACOEMULSIFICATION, CATARACT;  Surgeon: Alia Mckeon MD;  Location: [facility] OR 1ST FLR;  Service: Ophthalmology;  Laterality: Right;    SPINE SURGERY      TRANSESOPHAGEAL ECHOCARDIOGRAPHY N/A 3/22/2023    Procedure: ECHOCARDIOGRAM, TRANSESOPHAGEAL;  Surgeon: [facility] Diagnostic Provider;  Location: [facility] EP LAB;  Service: Anesthesiology;  Laterality: N/A;    TRANSESOPHAGEAL ECHOCARDIOGRAPHY N/A 2023    Procedure: ECHOCARDIOGRAM, TRANSESOPHAGEAL;  Surgeon: [facility] Diagnostic Provider;  Location: [facility] EP LAB;  Service: Anesthesiology;  Laterality: N/A;    VASECTOMY       Family History       Problem Relation (Age of Onset)    Alcohol abuse Father    Diabetes Brother    Heart disease Mother, Father, Brother, Sister    Heart failure Mother, Father, Brother    No Known Problems Sister, Maternal Grandmother, Maternal Grandfather, Paternal Grandmother, Paternal Grandfather, Maternal Aunt, Maternal Uncle, Paternal Aunt, Paternal Uncle          Tobacco Use    Smoking status: Former     Current packs/day: 0.00     Average packs/day: 1 pack/day for 20.0 years (20.0 ttl pk-yrs)     Types: Cigarettes     Start date: 1960     Quit date: 1980     Years since quittin.9     Passive exposure: Never    Smokeless tobacco: Never   Vaping Use    Vaping status: Never Used   Substance and Sexual Activity    Alcohol use: No    Drug use:  No    Sexual activity: Not Currently     Partners: Female     Review of Systems  ROS per HPI    Objective:     Vital Signs (Most Recent):  Temp: 98.4 °F (36.9 °C) (07/27/25 1730)  Pulse: (!) 55 (07/27/25 2109)  Resp: 20 (07/27/25 1730)  BP: (!) 177/74 (07/27/25 1900)  SpO2: 99 % (07/27/25 1900) Vital Signs (24h Range):  Temp:  [97.1 °F (36.2 °C)-98.4 °F (36.9 °C)] 98.4 °F (36.9 °C)  Pulse:  [55-73] 55  Resp:  [20] 20  SpO2:  [98 %-100 %] 99 %  BP: (144-196)/(65-77) 177/74     Weight: 65 kg (143 lb 4.8 oz)  Body mass index is 23.85 kg/m².    Date 07/27/25 0700 - 07/28/25 0659   Shift 8316-4003 8714-8661 3080-7369 24 Hour Total   INTAKE   Blood  397.5  397.5   Shift Total(mL/kg)  397.5(6.1)  397.5(6.1)   OUTPUT   Shift Total(mL/kg)       Weight (kg) 65 65 65 65        Physical Exam   NAD  Awake and alert  Head atraumatic   Anterior rhinoscopy with anterior septal perforation, active oozing from left septum just posterior to perforation and left inferior turbinate  Clot in posterior oropharynx  Normal WOB, no stridor or stertor    PROCEDURE: Flexible Nasal Endoscopy Exam  The risks, benefits, and alternatives to the procedure were explained. Patient/family  agreed to procedure; verbal consent obtained. The scope was inserted into the nasal cavity. Examination of the nasal cavity was performed; all were closely visualized to confirm presence or absence of erythema, edema, or structural lesions.     Pertinent exam findings include the following:  - Nasal cavity: anterior septal perforation; clot in left nasal cavity suctioned, appears to be active oozing from left anterior septum and left inferior turbinate; right nasal cavity appears hemostatic    The scope was removed. The patient tolerated the procedure well without complications.    PROCEDURE: Control of epistaxis -- Nasal packing  After obtaining consent from the patient/family, anterior rhinoscopy performed with debris suctioned from the nasal cavities. An active  bleed was identified at the left anterior nasal septum. Bilateral merocels were placed to stop the bleed given some of the bleeding from left was going to the right nasal cavity via the septal perforation. The pack was sprayed with oxymetazoline. At the conclusion of the procedure, no active bleeding around the nasal pack was seen. Old clot suctioned from posterior oropharynx.     Significant Labs:  CBC:   Recent Labs   Lab 07/27/25  1512   WBC 5.49   RBC 2.31*   HGB 7.1*   HCT 22.5*      MCV 97   MCH 30.7   MCHC 31.6*     CMP:   Recent Labs   Lab 07/27/25  1512      CALCIUM 9.7   ALBUMIN 3.2*   PROT 7.4      K 4.4   CO2 23      BUN 47*   CREATININE 2.5*   ALKPHOS 92   ALT 41   AST 51*   BILITOT 0.5       Significant Diagnostics:  None

## 2025-07-28 NOTE — PLAN OF CARE
[facility] - Internal Medicine Telemetry  Initial Discharge Assessment       Primary Care Provider: Devon Langston Jr., MD    Admission Diagnosis: Epistaxis [R04.0]  Chest pain [R07.9]  Symptomatic anemia [D64.9]    Admission Date: 7/27/2025  Expected Discharge Date: 7/31/2025    Transition of Care Barriers: None    Payor: HU[employer] MANAGED MEDICARE / Plan: HU[employer] MEDICARE HMO / Product Type: Capitation /     Extended Emergency Contact Information  Primary Emergency Contact: Ameena Urbina  Address: [address] [place] [id number] [place]  Mobile Phone: [phone]  Relation: Spouse    Discharge Plan A: Home with family, Home Health  Discharge Plan B: Home with family      [facility] - [place], [place] - [address]  [address] [address]  Phone: [phone] Fax: [phone]    [facility]  [address] [facility] [address]  Phone: [phone] Fax: [phone]    [facility][id number] - [place], [place] - [address]  [address]  [place] [address]  Phone: [phone] Fax: [phone]      Initial Assessment (most recent)       Adult Discharge Assessment - 07/28/25 1525          Discharge Assessment    Assessment Type Discharge Planning Assessment     Confirmed/corrected address, phone number and insurance Yes     Confirmed Demographics Correct on Facesheet     Source of Information patient     Communicated ASTER with patient/caregiver Yes     People in Home spouse     Name(s) of People in Home Ameena Urbina (Spouse)  [phone]     Do you expect to return to your current living situation? Yes     Do you have help at home or someone to help you manage your care at home? Yes     Who are your caregiver(s) and their phone number(s)? Ameena Urbina (Spouse)  [phone]     Prior to hospitilization cognitive status: Alert/Oriented     Current cognitive status: Alert/Oriented     Walking or Climbing Stairs Difficulty no      Dressing/Bathing Difficulty no     Equipment Currently Used at Home other (see comments)   TBD    Readmission within 30 days? No     Patient currently being followed by outpatient case management? Unable to determine (comments)     Do you currently have service(s) that help you manage your care at home? No     Do you take prescription medications? Yes     Do you have prescription coverage? Yes     Do you have any problems affording any of your prescribed medications? No     Is the patient taking medications as prescribed? yes     How do you get to doctors appointments? family or friend will provide;car, drives self     Are you on dialysis? No     Do you take coumadin? Yes     Who monitors your labs? Patient has labs monitored at [facility]     Discharge Plan A Home with family;Home Health     Discharge Plan B Home with family     DME Needed Upon Discharge  other (see comments)   TBD    Discharge Plan discussed with: Spouse/sig other;Patient     Name(s) and Number(s) CiaraAmeena (Spouse)  [phone]     Transition of Care Barriers None        Physical Activity    On average, how many days per week do you engage in moderate to strenuous exercise (like a brisk walk)? 0 days     On average, how many minutes do you engage in exercise at this level? 0 min        Financial Resource Strain    How hard is it for you to pay for the very basics like food, housing, medical care, and heating? Not hard at all        Housing Stability    In the last 12 months, was there a time when you were not able to pay the mortgage or rent on time? No     At any time in the past 12 months, were you homeless or living in a shelter (including now)? No        Transportation Needs    In the past 12 months, has lack of transportation kept you from medical appointments or from getting medications? No     In the past 12 months, has lack of transportation kept you from meetings, work, or from getting things needed for daily living? No         Food Insecurity    Within the past 12 months, you worried that your food would run out before you got the money to buy more. Never true     Within the past 12 months, the food you bought just didn't last and you didn't have money to get more. Never true        Stress    Do you feel stress - tense, restless, nervous, or anxious, or unable to sleep at night because your mind is troubled all the time - these days? Not at all        Social Isolation    How often do you feel lonely or isolated from those around you?  Never        Alcohol Use    Q1: How often do you have a drink containing alcohol? Never     Q2: How many drinks containing alcohol do you have on a typical day when you are drinking? Patient does not drink     Q3: How often do you have six or more drinks on one occasion? Never        Utilities    In the past 12 months has the electric, gas, oil, or water company threatened to shut off services in your home? No        Health Literacy    How often do you need to have someone help you when you read instructions, pamphlets, or other written material from your doctor or pharmacy? Often                   CM completed the initial assessment with the patient and his spouse. Patient states that he is able to ambulate, but he is unable to ambulate long distances. Patient denies using DME at home. CM to notify medical team.    Discharge Plan A and Plan B have been determined by review of patient's clinical status, future medical and therapeutic needs, and coverage/benefits for post-acute care in coordination with multidisciplinary team members.    Bobo Isaacs RN-CM  Case Management  [facility]  [phone]              WDL

## 2025-07-28 NOTE — ASSESSMENT & PLAN NOTE
Patient has long standing persistent (>12 months) atrial fibrillation. Patient is currently in sinus rhythm. [id number] Score: 4. The patients heart rate in the last 24 hours is as follows:  Pulse  Min: 65  Max: 67      Antiarrhythmics     Anticoagulants     Plan  - Replete lytes with a goal of K>4, Mg >2  - Holding warfarin in setting of epistaxis; currently therapeutic  - Patient's afib is currently controlled

## 2025-07-28 NOTE — PROGRESS NOTES
[facility] - Internal Medicine [facility] Medicine  Progress Note    Patient Name: Dariel Urbina  MRN: [id number]  Patient Class: OP- Observation   Admission Date: 7/27/2025  Length of Stay: 0 days  Attending Physician: Octavio Santos MD  Primary Care Provider: Devon Langston Jr., MD        Subjective     Principal Problem:Epistaxis        HPI:  Mr. 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 He felt weak, but no HA, vision changes, SOB, no new chest pain, no other overt bleeding nor stool changes, no LE edema.     In the ED he is afebrile and hemodynamically stable on RA with anemia to 7.1, Cr 2.5 (at baseline), INR 3.1. 1 u pRBC ordered, TXA ordered, and ENT consulted for nasal packing and recommendations for epistaxis management. Admitted to Hospital Medicine for evaluation of epistaxis in patient with mechanical AVR.     Overview/Hospital Course:  Patient admitted for recurrent epistaxis in setting of warfarin use. INR 3.1. Warfarin held and ENT consulted. Pharmacy consulted for warfarin administration. Nose packed by ENT; Hgb stable.    Interval History: Pt seen and examined this morning on rounds. PALLAVI. Patient admitted for recurrent epistaxis in setting of warfarin use. INR 2.9 this morning. Patient with nose packing in place. Reports no bleed-through. Hgb stable this morning. Care plan reviewed. Otherwise, doing well and with no further complaints at this time.        Objective:     Vital Signs (Most Recent):  Temp: 96.9 °F (36.1 °C) (07/28/25 0804)  Pulse: 64 (07/28/25 0804)  Resp: 16 (07/28/25 0804)  BP: (!) 159/62 (07/28/25 0804)  SpO2: 99 % (07/28/25 0804) Vital Signs (24h Range):  Temp:  [96.9 °F (36.1 °C)-98.5 °F  (36.9 °C)] 96.9 °F (36.1 °C)  Pulse:  [55-84] 64  Resp:  [16-20] 16  SpO2:  [96 %-100 %] 99 %  BP: (122-196)/(48-77) 159/62     Weight: 65 kg (143 lb 4.8 oz)  Body mass index is 23.85 kg/m².    Intake/Output Summary (Last 24 hours) at 7/28/2025 0841  Last data filed at 7/27/2025 1951  Gross per 24 hour   Intake 397.5 ml   Output --   Net 397.5 ml         Physical Exam  Vitals and nursing note reviewed.   Constitutional:       Appearance: He is ill-appearing (chronically).   HENT:      Head: Normocephalic and atraumatic.      Nose:      Comments: Dried blood in nares, nasal packing in place  Eyes:      Conjunctiva/sclera: Conjunctivae normal.   Cardiovascular:      Rate and Rhythm: Normal rate and regular rhythm.      Pulses: Normal pulses.   Pulmonary:      Effort: Pulmonary effort is normal. No respiratory distress.      Breath sounds: No wheezing or rales.   Abdominal:      Palpations: Abdomen is soft.      Tenderness: There is no abdominal tenderness. There is no guarding.   Musculoskeletal:      Cervical back: Normal range of motion.      Right lower leg: No edema.      Left lower leg: No edema.   Skin:     General: Skin is warm and dry.   Neurological:      General: No focal deficit present.      Mental Status: He is alert and oriented to person, place, and time.   Psychiatric:         Mood and Affect: Mood normal.           Significant Labs: All pertinent labs within the past 24 hours have been reviewed.    Significant Imaging: I have reviewed all pertinent imaging results/findings within the past 24 hours.      Assessment & Plan  Epistaxis  Chronic anticoagulation  H/O mechanical aortic valve replacement  - appreciated ENT evaluation in the ED, follow up recs  - s/p TXA and 1 u pRBC ordered in ED  - given INR 3 -> 2.9, hold coumadin  - Pharmacy consult for warfarin dosing     Acute blood loss anemia  Recurrent epistaxis  Anemia of chronic renal failure    Anemia is likely due to acute blood loss which was  from epistaxis and chronic disease due to Chronic Kidney Disease. Most recent hemoglobin and hematocrit are listed below.  Recent Labs     07/27/25  1512 07/28/25  0547   HGB 7.1* 7.6*   HCT 22.5* 24.1*     Plan  - Monitor serial CBC: Daily  - Transfuse PRBC if patient becomes hemodynamically unstable, symptomatic or H/H drops below 7/21.  - Patient has received 1 units of PRBCs on 07/27  - Patient's anemia is currently stable      Hyperlipidemia associated with type 2 diabetes mellitus  - transitioned to formulary statin    Type 2 diabetes mellitus with stage 4 chronic kidney disease, without long-term current use of insulin  Creatine stable for now. BMP reviewed- noted Estimated Creatinine Clearance: 20.3 mL/min (A) (based on SCr of 2.4 mg/dL (H)). according to latest data. 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  Monitor UOP and serial BMP and adjust therapy as needed. Renally dose meds. Avoid nephrotoxic medications and procedures.  Hypertension associated with diabetes  - Resume home Imdur    Coronary artery disease involving native coronary artery of native heart without angina pectoris  Patient with known CAD s/p CABG, which is controlled Will continue Statin and monitor for S/Sx of angina/ACS. Continue to monitor on telemetry.   Paroxysmal atrial fibrillation  Patient has long standing persistent (>12 months) atrial fibrillation. Patient is currently in sinus rhythm. [id number] Score: 4. The patients heart rate in the last 24 hours is as follows:  Pulse  Min: 65  Max: 67      Antiarrhythmics     Anticoagulants     Plan  - Replete lytes with a goal of K>4, Mg >2  - Holding warfarin in setting of epistaxis; currently therapeutic  - Patient's afib is currently controlled    Chronic kidney disease, stage 4 (severe)  - Cr baseline at admission  - Renally dosing all medications  - Avoiding nephrotoxins  - Will continue to monitor on daily labs      VTE Risk Mitigation (From admission, onward)            Ordered     IP VTE HIGH RISK PATIENT  Once         07/27/25 1640     Place sequential compression device  Until discontinued         07/27/25 1640     Reason for No Pharmacological VTE Prophylaxis  Once        Question:  Reasons:  Answer:  Active Bleeding    07/27/25 1640                    Discharge Planning   ASTER: 7/28/2025     Code Status: Full Code   Medical Readiness for Discharge Date:                            Octavio Santos MD  Department of Hospital Medicine   [facility] - Internal Medicine Telemetry

## 2025-07-28 NOTE — ASSESSMENT & PLAN NOTE
Anemia is likely due to acute blood loss which was from epistaxis and chronic disease due to Chronic Kidney Disease. Most recent hemoglobin and hematocrit are listed below.  Recent Labs     07/27/25  1512 07/28/25  0547   HGB 7.1* 7.6*   HCT 22.5* 24.1*     Plan  - Monitor serial CBC: Daily  - Transfuse PRBC if patient becomes hemodynamically unstable, symptomatic or H/H drops below 7/21.  - Patient has received 1 units of PRBCs on 07/27  - Patient's anemia is currently stable

## 2025-07-28 NOTE — ASSESSMENT & PLAN NOTE
83 yo male with heart valve on Warfarin and recurrent epistaxis presents to ER due to left sided epistaxis. Currently packed with bilateral merocels. Hb 7.1 in ED, receiving 1u pRBC and admitted to medicine.     07/28/2025: Hemostatic today. Will plan to go to OR renée for nasal endoscopy and control of hemorrhage.    - Transfuse 2u pRBC to optimize H/H prior to OR  - NPO at midnight  - Maintain bilateral merocel  - Please place patient on anti-staph antibiotics while packing is in place  - Afrin to bilateral nares q8h for the next 48h  - May place mustache dressing under nose for trickling, ok if dressing becomes saturated with some red/brown drainage  - Nasal saline q4h while packing in place, even on side with nasal packing. Can continue nasal saline sprays to keep mucosa moist to prevent future bleeding.  - Keep head of bed elevated  - Apply Afrin to affected nasal cavity if epistaxis recurs  - Please call ENT with questions  - Remainder of care per primary team

## 2025-07-28 NOTE — ED NOTES
Telemetry Verification   Patient placed on Telemetry Box  Verified with War Room  Box #    Monitor Tech Walker RN   Rate 60   Rhythm Normal Sinus

## 2025-07-28 NOTE — HOSPITAL COURSE
Patient admitted for recurrent epistaxis in setting of warfarin use. INR 3.1. Warfarin held and ENT consulted. Pharmacy consulted for warfarin administration. Nose packed by ENT; Hgb stable. Patient to OR with ENT on 7/29 for nasal cauterization which he tolerated well. Hgb uptrended and patient without recurrent epistaxis. INR remained above goal (goal 2.0-2.5 per cardiologist; INR was 2.8 at time of discharge). Held warfarin during his stay and patient to follow up with coumadin clinic (inpatient pharmacy to coordinate with coumadin clinic to follow up INR check) for continued warfarin dosing. Patient seen and evaluated on day of discharge. Pt deemed appropriate for discharge. Plan discussed with pt, who was agreeable and amenable; medications were discussed and reviewed, outpatient follow-up scheduled, ER precautions were given, all questions were answered to the pt's satisfaction, and patient was subsequently discharged.

## 2025-07-28 NOTE — PROGRESS NOTES
[facility] - Internal Medicine Telemetry  Otorhinolaryngology-Head & Neck Surgery  Progress Note    Subjective:     Post-Op Info:  Procedure(s) (LRB):  CONTROL OF HEMORRHAGE, NASOPHARYNX, COMPLICATED (Bilateral)      Hospital Day: 2     Interval History: Bilateral merocels in place, patient denies further bleeding overnight. Resting comfortably in bed this AM.     Medications:  Continuous Infusions:  Scheduled Meds:   allopurinoL  100 mg Oral Daily    amoxicillin-clavulanate 250-125mg  2 tablet Oral Q12H    atorvastatin  40 mg Oral Daily    ferrous gluconate  324 mg Oral Daily with breakfast    isosorbide mononitrate  60 mg Oral QHS    QUEtiapine  50 mg Oral QHS    sodium chloride  1 spray Each Nostril Q4H     PRN Meds:  Current Facility-Administered Medications:     0.9%  NaCl infusion (for blood administration), , Intravenous, Q24H PRN    acetaminophen, 1,000 mg, Oral, Q8H PRN    acetaminophen, 650 mg, Oral, Q4H PRN    dextrose 50%, 12.5 g, Intravenous, PRN    dextrose 50%, 25 g, Intravenous, PRN    glucagon (human recombinant), 1 mg, Intramuscular, PRN    glucose, 16 g, Oral, PRN    glucose, 24 g, Oral, PRN    HYDROmorphone, 1 mg, Intravenous, Q4H PRN    naloxone, 0.02 mg, Intravenous, PRN    ondansetron, 4 mg, Intravenous, Q8H PRN    oxymetazoline, 2 spray, Each Nostril, BID PRN    sodium chloride, 1 spray, Each Nostril, PRN    sodium chloride 0.9%, 10 mL, Intravenous, Q12H PRN     Review of patient's allergies indicates:   Allergen Reactions    Lisinopril     Losartan      Intolerance- elevates potassium level     Objective:     Vital Signs (24h Range):  Temp:  [96.9 °F (36.1 °C)-98.5 °F (36.9 °C)] 96.9 °F (36.1 °C)  Pulse:  [55-84] 64  Resp:  [16-20] 16  SpO2:  [96 %-100 %] 99 %  BP: (122-196)/(48-77) 159/62       Lines/Drains/Airways       Peripheral Intravenous Line  Duration             Peripheral IV Single Lumen 07/27/25 1510 20 G Anterior;Distal;Left Upper Arm 1 day    Peripheral IV Single Lumen 07/27/25  1618 20 G Posterior;Right Forearm <1 day                     Physical Exam   NAD  Awake and alert  Head atraumatic   Bilateral merocels in place  Clot in posterior oropharynx, no active bleeding   Normal WOB, no stridor or stertor    Significant Labs:  CBC:   Recent Labs   Lab 07/28/25  0547   WBC 3.91   RBC 2.53*   HGB 7.6*   HCT 24.1*      MCV 95   MCH 30.0   MCHC 31.5*     CMP:   Recent Labs   Lab 07/27/25  1512 07/28/25  0547    67*   CALCIUM 9.7 9.2   ALBUMIN 3.2*  --    PROT 7.4  --     136   K 4.4 4.1   CO2 23 18*    109   BUN 47* 55*   CREATININE 2.5* 2.4*   ALKPHOS 92  --    ALT 41  --    AST 51*  --    BILITOT 0.5  --        Significant Diagnostics:  None  Assessment/Plan:     Recurrent epistaxis  83 yo male with heart valve on Warfarin and recurrent epistaxis presents to ER due to left sided epistaxis. Currently packed with bilateral merocels. Hb 7.1 in ED, receiving 1u pRBC and admitted to medicine.     07/28/2025: Hemostatic today. Will plan to go to OR renée for nasal endoscopy and control of hemorrhage.    - Transfuse 2u pRBC to optimize H/H prior to OR  - NPO at midnight  - Maintain bilateral merocel  - Please place patient on anti-staph antibiotics while packing is in place  - Afrin to bilateral nares q8h for the next 48h  - May place mustache dressing under nose for trickling, ok if dressing becomes saturated with some red/brown drainage  - Nasal saline q4h while packing in place, even on side with nasal packing. Can continue nasal saline sprays to keep mucosa moist to prevent future bleeding.  - Keep head of bed elevated  - Apply Afrin to affected nasal cavity if epistaxis recurs  - Please call ENT with questions  - Remainder of care per primary team           Kyle Berg MD  Otorhinolaryngology-Head & Neck Surgery  [facility] - Internal Medicine Telemetry

## 2025-07-28 NOTE — ASSESSMENT & PLAN NOTE
- Cr baseline at admission  - Renally dosing all medications  - Avoiding nephrotoxins  - Will continue to monitor on daily labs

## 2025-07-29 ENCOUNTER — ANESTHESIA (OUTPATIENT)
Dept: SURGERY | Facility: HOSPITAL | Age: 85
End: 2025-07-29
Payer: MEDICARE

## 2025-07-29 LAB
ABO + RH BLD: NORMAL
ABO + RH BLD: NORMAL
ABSOLUTE EOSINOPHIL (OHS): 0.14 K/UL
ABSOLUTE MONOCYTE (OHS): 0.48 K/UL (ref 0.3–1)
ABSOLUTE NEUTROPHIL COUNT (OHS): 2.89 K/UL (ref 1.8–7.7)
ANION GAP (OHS): 8 MMOL/L (ref 8–16)
BASOPHILS # BLD AUTO: 0.03 K/UL
BASOPHILS NFR BLD AUTO: 0.7 %
BLD PROD TYP BPU: NORMAL
BLD PROD TYP BPU: NORMAL
BLOOD UNIT EXPIRATION DATE: NORMAL
BLOOD UNIT EXPIRATION DATE: NORMAL
BLOOD UNIT TYPE CODE: 9500
BLOOD UNIT TYPE CODE: 9500
BUN SERPL-MCNC: 54 MG/DL (ref 8–23)
CALCIUM SERPL-MCNC: 9 MG/DL (ref 8.7–10.5)
CHLORIDE SERPL-SCNC: 112 MMOL/L (ref 95–110)
CO2 SERPL-SCNC: 18 MMOL/L (ref 23–29)
CREAT SERPL-MCNC: 2.4 MG/DL (ref 0.5–1.4)
CROSSMATCH INTERPRETATION: NORMAL
CROSSMATCH INTERPRETATION: NORMAL
DISPENSE STATUS: NORMAL
DISPENSE STATUS: NORMAL
ERYTHROCYTE [DISTWIDTH] IN BLOOD BY AUTOMATED COUNT: 14.7 % (ref 11.5–14.5)
GFR SERPLBLD CREATININE-BSD FMLA CKD-EPI: 26 ML/MIN/1.73/M2
GLUCOSE SERPL-MCNC: 68 MG/DL (ref 70–110)
HCT VFR BLD AUTO: 23.3 % (ref 40–54)
HGB BLD-MCNC: 7.4 GM/DL (ref 14–18)
IMM GRANULOCYTES # BLD AUTO: 0.01 K/UL (ref 0–0.04)
IMM GRANULOCYTES NFR BLD AUTO: 0.2 % (ref 0–0.5)
INR PPP: 3 (ref 0.8–1.2)
LYMPHOCYTES # BLD AUTO: 0.81 K/UL (ref 1–4.8)
MCH RBC QN AUTO: 30 PG (ref 27–31)
MCHC RBC AUTO-ENTMCNC: 31.8 G/DL (ref 32–36)
MCV RBC AUTO: 94 FL (ref 82–98)
NUCLEATED RBC (/100WBC) (OHS): 0 /100 WBC
PLATELET # BLD AUTO: 155 K/UL (ref 150–450)
PMV BLD AUTO: 10.3 FL (ref 9.2–12.9)
POCT GLUCOSE: 87 MG/DL (ref 70–110)
POTASSIUM SERPL-SCNC: 4.2 MMOL/L (ref 3.5–5.1)
PROTHROMBIN TIME: 30.6 SECONDS (ref 9–12.5)
RBC # BLD AUTO: 2.47 M/UL (ref 4.6–6.2)
RELATIVE EOSINOPHIL (OHS): 3.2 %
RELATIVE LYMPHOCYTE (OHS): 18.6 % (ref 18–48)
RELATIVE MONOCYTE (OHS): 11 % (ref 4–15)
RELATIVE NEUTROPHIL (OHS): 66.3 % (ref 38–73)
SODIUM SERPL-SCNC: 138 MMOL/L (ref 136–145)
UNIT NUMBER: NORMAL
UNIT NUMBER: NORMAL
WBC # BLD AUTO: 4.36 K/UL (ref 3.9–12.7)

## 2025-07-29 PROCEDURE — 82962 GLUCOSE BLOOD TEST: CPT | Mod: HCNC | Performed by: STUDENT IN AN ORGANIZED HEALTH CARE EDUCATION/TRAINING PROGRAM

## 2025-07-29 PROCEDURE — 80048 BASIC METABOLIC PNL TOTAL CA: CPT | Mod: HCNC

## 2025-07-29 PROCEDURE — 25000003 PHARM REV CODE 250: Mod: HCNC | Performed by: INTERNAL MEDICINE

## 2025-07-29 PROCEDURE — 86920 COMPATIBILITY TEST SPIN: CPT | Mod: HCNC

## 2025-07-29 PROCEDURE — 36415 COLL VENOUS BLD VENIPUNCTURE: CPT | Mod: HCNC

## 2025-07-29 PROCEDURE — 85610 PROTHROMBIN TIME: CPT | Mod: HCNC

## 2025-07-29 PROCEDURE — 36000706: Mod: HCNC | Performed by: STUDENT IN AN ORGANIZED HEALTH CARE EDUCATION/TRAINING PROGRAM

## 2025-07-29 PROCEDURE — 97161 PT EVAL LOW COMPLEX 20 MIN: CPT | Mod: HCNC

## 2025-07-29 PROCEDURE — 36000707: Mod: HCNC | Performed by: STUDENT IN AN ORGANIZED HEALTH CARE EDUCATION/TRAINING PROGRAM

## 2025-07-29 PROCEDURE — 63600175 PHARM REV CODE 636 W HCPCS: Mod: HCNC

## 2025-07-29 PROCEDURE — 25000003 PHARM REV CODE 250: Mod: HCNC

## 2025-07-29 PROCEDURE — 71000033 HC RECOVERY, INTIAL HOUR: Mod: HCNC | Performed by: STUDENT IN AN ORGANIZED HEALTH CARE EDUCATION/TRAINING PROGRAM

## 2025-07-29 PROCEDURE — 94761 N-INVAS EAR/PLS OXIMETRY MLT: CPT | Mod: HCNC

## 2025-07-29 PROCEDURE — 25000003 PHARM REV CODE 250: Mod: HCNC | Performed by: STUDENT IN AN ORGANIZED HEALTH CARE EDUCATION/TRAINING PROGRAM

## 2025-07-29 PROCEDURE — 36430 TRANSFUSION BLD/BLD COMPNT: CPT | Mod: 59,HCNC

## 2025-07-29 PROCEDURE — 27201423 OPTIME MED/SURG SUP & DEVICES STERILE SUPPLY: Mod: HCNC | Performed by: STUDENT IN AN ORGANIZED HEALTH CARE EDUCATION/TRAINING PROGRAM

## 2025-07-29 PROCEDURE — 37000009 HC ANESTHESIA EA ADD 15 MINS: Mod: HCNC | Performed by: STUDENT IN AN ORGANIZED HEALTH CARE EDUCATION/TRAINING PROGRAM

## 2025-07-29 PROCEDURE — P9016 RBC LEUKOCYTES REDUCED: HCPCS | Mod: HCNC

## 2025-07-29 PROCEDURE — 31237 NSL/SINS NDSC SURG BX POLYPC: CPT | Mod: 50,HCNC,, | Performed by: STUDENT IN AN ORGANIZED HEALTH CARE EDUCATION/TRAINING PROGRAM

## 2025-07-29 PROCEDURE — 97535 SELF CARE MNGMENT TRAINING: CPT | Mod: HCNC

## 2025-07-29 PROCEDURE — 37000008 HC ANESTHESIA 1ST 15 MINUTES: Mod: HCNC | Performed by: STUDENT IN AN ORGANIZED HEALTH CARE EDUCATION/TRAINING PROGRAM

## 2025-07-29 PROCEDURE — G0378 HOSPITAL OBSERVATION PER HR: HCPCS | Mod: HCNC

## 2025-07-29 PROCEDURE — 71000015 HC POSTOP RECOV 1ST HR: Mod: HCNC | Performed by: STUDENT IN AN ORGANIZED HEALTH CARE EDUCATION/TRAINING PROGRAM

## 2025-07-29 PROCEDURE — 85025 COMPLETE CBC W/AUTO DIFF WBC: CPT | Mod: HCNC

## 2025-07-29 PROCEDURE — 63600175 PHARM REV CODE 636 W HCPCS: Mod: HCNC | Performed by: STUDENT IN AN ORGANIZED HEALTH CARE EDUCATION/TRAINING PROGRAM

## 2025-07-29 PROCEDURE — 97165 OT EVAL LOW COMPLEX 30 MIN: CPT | Mod: HCNC

## 2025-07-29 RX ORDER — PROPOFOL 10 MG/ML
VIAL (ML) INTRAVENOUS
Status: DISCONTINUED | OUTPATIENT
Start: 2025-07-29 | End: 2025-07-29

## 2025-07-29 RX ORDER — VASOPRESSIN 20 [USP'U]/ML
INJECTION, SOLUTION INTRAMUSCULAR; SUBCUTANEOUS
Status: DISCONTINUED | OUTPATIENT
Start: 2025-07-29 | End: 2025-07-29

## 2025-07-29 RX ORDER — OXYMETAZOLINE HCL 0.05 %
SPRAY, NON-AEROSOL (ML) NASAL
Status: DISCONTINUED | OUTPATIENT
Start: 2025-07-29 | End: 2025-07-29 | Stop reason: HOSPADM

## 2025-07-29 RX ORDER — HYDROCODONE BITARTRATE AND ACETAMINOPHEN 500; 5 MG/1; MG/1
TABLET ORAL
Status: DISCONTINUED | OUTPATIENT
Start: 2025-07-29 | End: 2025-07-30 | Stop reason: HOSPADM

## 2025-07-29 RX ORDER — ONDANSETRON HYDROCHLORIDE 2 MG/ML
4 INJECTION, SOLUTION INTRAVENOUS DAILY PRN
Status: DISCONTINUED | OUTPATIENT
Start: 2025-07-29 | End: 2025-07-29 | Stop reason: HOSPADM

## 2025-07-29 RX ORDER — EPINEPHRINE 1 MG/ML
INJECTION, SOLUTION, CONCENTRATE INTRAVENOUS
Status: DISCONTINUED | OUTPATIENT
Start: 2025-07-29 | End: 2025-07-29 | Stop reason: HOSPADM

## 2025-07-29 RX ORDER — FENTANYL CITRATE 50 UG/ML
INJECTION, SOLUTION INTRAMUSCULAR; INTRAVENOUS
Status: DISCONTINUED | OUTPATIENT
Start: 2025-07-29 | End: 2025-07-29

## 2025-07-29 RX ORDER — ROCURONIUM BROMIDE 10 MG/ML
INJECTION, SOLUTION INTRAVENOUS
Status: DISCONTINUED | OUTPATIENT
Start: 2025-07-29 | End: 2025-07-29

## 2025-07-29 RX ORDER — ONDANSETRON HYDROCHLORIDE 2 MG/ML
INJECTION, SOLUTION INTRAVENOUS
Status: DISCONTINUED | OUTPATIENT
Start: 2025-07-29 | End: 2025-07-29

## 2025-07-29 RX ORDER — SODIUM CHLORIDE 9 MG/ML
INJECTION, SOLUTION INTRAVENOUS CONTINUOUS
Status: DISCONTINUED | OUTPATIENT
Start: 2025-07-29 | End: 2025-07-30 | Stop reason: HOSPADM

## 2025-07-29 RX ORDER — ONDANSETRON HYDROCHLORIDE 2 MG/ML
4 INJECTION, SOLUTION INTRAVENOUS ONCE AS NEEDED
Status: DISCONTINUED | OUTPATIENT
Start: 2025-07-29 | End: 2025-07-29 | Stop reason: HOSPADM

## 2025-07-29 RX ORDER — HYDROMORPHONE HYDROCHLORIDE 1 MG/ML
0.2 INJECTION, SOLUTION INTRAMUSCULAR; INTRAVENOUS; SUBCUTANEOUS EVERY 5 MIN PRN
Status: DISCONTINUED | OUTPATIENT
Start: 2025-07-29 | End: 2025-07-29 | Stop reason: HOSPADM

## 2025-07-29 RX ORDER — PHENYLEPHRINE HYDROCHLORIDE 10 MG/ML
INJECTION INTRAVENOUS
Status: DISCONTINUED | OUTPATIENT
Start: 2025-07-29 | End: 2025-07-29

## 2025-07-29 RX ORDER — LIDOCAINE HYDROCHLORIDE 20 MG/ML
INJECTION INTRAVENOUS
Status: DISCONTINUED | OUTPATIENT
Start: 2025-07-29 | End: 2025-07-29

## 2025-07-29 RX ORDER — DEXAMETHASONE SODIUM PHOSPHATE 4 MG/ML
INJECTION, SOLUTION INTRA-ARTICULAR; INTRALESIONAL; INTRAMUSCULAR; INTRAVENOUS; SOFT TISSUE
Status: DISCONTINUED | OUTPATIENT
Start: 2025-07-29 | End: 2025-07-29

## 2025-07-29 RX ADMIN — ALLOPURINOL 100 MG: 100 TABLET ORAL at 11:07

## 2025-07-29 RX ADMIN — ROCURONIUM BROMIDE 50 MG: 10 INJECTION, SOLUTION INTRAVENOUS at 10:07

## 2025-07-29 RX ADMIN — SODIUM CHLORIDE: 9 INJECTION, SOLUTION INTRAVENOUS at 09:07

## 2025-07-29 RX ADMIN — GLYCOPYRROLATE 0.2 MG: 0.2 INJECTION, SOLUTION INTRAMUSCULAR; INTRAVENOUS at 10:07

## 2025-07-29 RX ADMIN — ONDANSETRON 4 MG: 2 INJECTION INTRAMUSCULAR; INTRAVENOUS at 11:07

## 2025-07-29 RX ADMIN — ISOSORBIDE MONONITRATE 60 MG: 60 TABLET, EXTENDED RELEASE ORAL at 09:07

## 2025-07-29 RX ADMIN — NASAL 1 SPRAY: 6.5 SPRAY NASAL at 05:07

## 2025-07-29 RX ADMIN — PHENYLEPHRINE HYDROCHLORIDE 200 MCG: 10 INJECTION INTRAVENOUS at 10:07

## 2025-07-29 RX ADMIN — ATORVASTATIN CALCIUM 40 MG: 40 TABLET, FILM COATED ORAL at 11:07

## 2025-07-29 RX ADMIN — DEXAMETHASONE SODIUM PHOSPHATE 4 MG: 4 INJECTION, SOLUTION INTRAMUSCULAR; INTRAVENOUS at 10:07

## 2025-07-29 RX ADMIN — LIDOCAINE HYDROCHLORIDE 100 MG: 20 INJECTION INTRAVENOUS at 10:07

## 2025-07-29 RX ADMIN — NASAL 1 SPRAY: 6.5 SPRAY NASAL at 01:07

## 2025-07-29 RX ADMIN — SUGAMMADEX 200 MG: 100 INJECTION, SOLUTION INTRAVENOUS at 11:07

## 2025-07-29 RX ADMIN — PHENYLEPHRINE HYDROCHLORIDE 100 MCG: 10 INJECTION INTRAVENOUS at 10:07

## 2025-07-29 RX ADMIN — VASOPRESSIN 1 UNITS: 20 INJECTION INTRAVENOUS at 10:07

## 2025-07-29 RX ADMIN — NASAL 1 SPRAY: 6.5 SPRAY NASAL at 02:07

## 2025-07-29 RX ADMIN — PROPOFOL 70 MG: 10 INJECTION, EMULSION INTRAVENOUS at 10:07

## 2025-07-29 RX ADMIN — NASAL 1 SPRAY: 6.5 SPRAY NASAL at 09:07

## 2025-07-29 RX ADMIN — QUETIAPINE FUMARATE 50 MG: 25 TABLET ORAL at 09:07

## 2025-07-29 RX ADMIN — SODIUM CHLORIDE: 9 INJECTION, SOLUTION INTRAVENOUS at 10:07

## 2025-07-29 RX ADMIN — FENTANYL CITRATE 100 MCG: 50 INJECTION, SOLUTION INTRAMUSCULAR; INTRAVENOUS at 10:07

## 2025-07-29 NOTE — ASSESSMENT & PLAN NOTE
- appreciated ENT evaluation in the ED, follow up recs   - Plan for OR for nasal cauterization 7/29  - s/p TXA and 1 u pRBC ordered in ED  - given INR 3 - hold coumadin  - Pharmacy consult for warfarin dosing

## 2025-07-29 NOTE — PLAN OF CARE
OT eval complete. OT POC and goals established.   Problem: Occupational Therapy  Goal: Occupational Therapy Goal  Description: Goals to be met by: 8/28/25     Patient will increase functional independence with ADLs by performing:    LE Dressing with [place].  Grooming while standing at sink with [place].  Toileting from toilet with [place] for hygiene and clothing management.   Supine to sit with [place].  Toilet transfer to toilet with [place].    Outcome: Progressing

## 2025-07-29 NOTE — PT/OT/SLP EVAL
Occupational Therapy   Co-Evaluation  Co-treatment performed due to patient's multiple deficits requiring two skilled therapists to appropriately and safely assess patient's strength and endurance while facilitating functional tasks in addition to accommodating for patient's activity tolerance.      Name: Dariel Urbina  MRN: [id number]  Admitting Diagnosis: Epistaxis  Recent Surgery: Procedure(s) (LRB):  ENDOSCOPY, NOSE (Bilateral)  CAUTERIZATION, NOSE (Bilateral) * Day of Surgery *    Recommendations:     Discharge Recommendations: No Therapy Indicated  Discharge Equipment Recommendations:  none  Barriers to discharge:  None    Assessment:     Dariel Urbina is a 84 y.o. male with a medical diagnosis of Epistaxis.  He presents with complaints of fatigue following a procedure performed this day, but agreeable to therapy. Pt required light assistance for all self-care tasks, functional transfers, and functional mobility this date without incident. Performance deficits affecting function: impaired endurance, impaired self care skills, impaired functional mobility, gait instability, impaired balance.  Pt would continue to benefit from skilled OT services to maximize functional independence with ADLs and functional mobility, reduce caregiver burden, and facilitate safe discharge in the least restrictive environment.      Rehab Prognosis: Good; patient would benefit from acute skilled OT services to address these deficits and reach maximum level of function.       Plan:     Patient to be seen 3 x/week to address the above listed problems via self-care/home management, therapeutic activities, therapeutic exercises  Plan of Care Expires: 08/28/25  Plan of Care Reviewed with: patient, spouse    Subjective     Chief Complaint: fatigue  Patient/Family Comments/goals: to return to PLOF; to rest    Occupational Profile:  Living Environment: Pt lives with his spouse in a [facility] with 1 JULISA. T/s combo with shower  chair  Previous level of function: Independent   Roles and Routines: (+) drives; retired; enjoys fishing  Equipment Used at Home: cane, straight, wheelchair  Assistance upon Discharge: wife    Pain/Comfort:  Pain Rating 1: 0/10  Pain Rating Post-Intervention 1: 0/10    Patients cultural, spiritual, Evangelical conflicts given the current situation: no    Objective:   Additional staff present: LEELEE Gabriel    Communicated with: RN prior to session.  Patient found HOB elevated with bed alarm upon OT entry to room.    General Precautions: Standard, fall  Orthopedic Precautions: N/A  Braces: N/A  Respiratory Status: Room air    Occupational Performance:    Bed Mobility:    Patient completed Scooting/Bridging with stand by assistance  Patient completed Supine to Sit with stand by assistance    Functional Mobility/Transfers:  Patient completed Sit <> Stand Transfer with stand by assistance  with  no assistive device   Patient completed Toilet Transfer Step Transfer technique with stand by assistance with  no AD  Functional Mobility: Pt engaged in functional mobility to simulate household/community distances 10 ft x2 trials with CGA progressing to SBA and no AD in order to maximize functional endurance and standing balance required for engagement in occupations of choice   No LOB or SOB noted    Activities of Daily Living:  Grooming: stand by assistance to complete hand hygiene at sink  Upper Body Dressing: stand by assistance to don hospital gown sitting EOB  Lower Body Dressing: stand by assistance to don slip on shoes EOB  Toileting: stand by assistance seated pericare on toilet    Cognitive/Visual Perceptual:  Cognitive/Psychosocial Skills:     -       Oriented to: Person, Place, Time, and Situation   -       Follows Commands/attention:Follows multistep  commands  -       Safety awareness/insight to disability: impaired   -       Mood/Affect/Coping skills/emotional control: Cooperative    Physical Exam:  Upper Extremity  Range of Motion:     -       Right Upper Extremity: WFL  -       Left Upper Extremity: WFL  Upper Extremity Strength:    -       Right Upper Extremity: WFL  -       Left Upper Extremity: WFL    AMPAC 6 Click ADL:  AMPAC Total Score: 20    Treatment & Education:  -Education on energy conservation and task modification to maximize safety and (I) during ADLs and mobility  -Education on importance of OOB activity to improve overall activity tolerance and promote recovery  -Pt educated to call for assistance and to transfer with hospital staff only  -Provided education regarding role of OT, POC, & discharge recommendations with pt and spouse verbalizing understanding.  Pt had no further questions & when asked whether there were any concerns pt reported none.     Patient left up in chair with all lines intact, call button in reach, RN notified, and pt's spouse present    GOALS:   Multidisciplinary Problems       Occupational Therapy Goals          Problem: Occupational Therapy    Goal Priority Disciplines Outcome Interventions   Occupational Therapy Goal     OT, PT/OT Progressing    Description: Goals to be met by: 8/28/25     Patient will increase functional independence with ADLs by performing:    LE Dressing with [place].  Grooming while standing at sink with [place].  Toileting from toilet with [place] for hygiene and clothing management.   Supine to sit with [place].  Toilet transfer to toilet with [place].                         DME Justifications:  No DME recommended requiring DME justifications    History:     Past Medical History:   Diagnosis Date    Anemia of chronic renal failure, stage 3 (moderate) 05/27/2015    Anticoagulant long-term use     Atherosclerosis of coronary artery bypass graft of native heart without angina pectoris 09/11/2012    3-27-18 [facility] Two vessel coronary artery disease.   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    Bilateral carotid artery  disease 02/09/2017    Bleeding from the nose     Bleeding nose 03/21/2018    Cancer     Cataract     CHF (congestive heart failure)     CKD (chronic kidney disease) stage 3, GFR 30-59 ml/min 05/27/2015    Claudication of left lower extremity 09/17/2014    Colon polyp     Encounter for blood transfusion     Gastroesophageal reflux disease without esophagitis 03/19/2018    Gastrointestinal hemorrhage associated with intestinal diverticulosis 04/01/2018    Glaucoma     H/O mechanical aortic valve replacement 09/17/2014    History of gout 09/26/2012    Hyperparathyroidism due to renal insufficiency 07/27/2015    Internal hemorrhoid 04/03/2018    Long term current use of anticoagulant therapy 09/26/2012    Mechanical heart valve present     Metabolic acidosis with normal anion gap and bicarbonate losses 03/20/2018    Mixed hyperlipidemia 09/26/2012    NSTEMI (non-ST elevated myocardial infarction) 03/21/2018    Obesity, diabetes, and hypertension syndrome 02/23/2016    Paroxysmal atrial fibrillation 02/06/2023    PVD (peripheral vascular disease) 09/11/2012    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         Past Surgical History:   Procedure Laterality Date    BACK SURGERY      CARDIAC CATHETERIZATION      CARDIAC VALVE REPLACEMENT      CARDIAC VALVE SURGERY      CARPAL TUNNEL RELEASE Right 05/19/2020    Procedure: RELEASE, CARPAL TUNNEL;  Surgeon: Rupesh Norris Jr., MD;  Location: [facility];  Service: Plastics;  Laterality: Right;    CATARACT EXTRACTION Left 11/13/2022        COLON SURGERY      COLONOSCOPY N/A 03/31/2017    Procedure: COLONOSCOPY;  Surgeon: Bruno Raymond MD;   Location: [facility] ENDO (4TH FLR);  Service: Endoscopy;  Laterality: N/A;  Patient's wife requesting date.    COLONOSCOPY N/A 04/03/2018    Procedure: COLONOSCOPY;  Surgeon: Bonifacio Pelletier MD;  Location: [facility] ENDO (2ND FLR);  Service: Endoscopy;  Laterality: N/A;    COLONOSCOPY N/A 08/13/2018    Procedure: COLONOSCOPY;  Surgeon: Kam Barba MD;  Location: [facility] ENDO (2ND FLR);  Service: Endoscopy;  Laterality: N/A;  2nd floor: PA pressure 49; hx of moderate-severe valve disease     per Coumadin clinic-Patient can hold 5 days with lovenox bridge       ok to schedule per [facility]    CONTROL OF EPISTAXIS, POSTERIOR, USING NASAL PACKING OR CAUTERIZATION Bilateral 6/18/2024    Procedure: CONTROL OF EPISTAXIS, POSTERIOR, USING NASAL PACKING OR CAUTERIZATION;  Surgeon: Jono Russell MD;  [facility];  Service: ENT;  Laterality: Bilateral;    CONTROL OF EPISTAXIS, POSTERIOR, USING NASAL PACKING OR CAUTERIZATION Bilateral 8/8/2024    Procedure: CONTROL OF EPISTAXIS, POSTERIOR, USING NASAL PACKING OR CAUTERIZATION;  Surgeon: Jono Russell MD;  [facility];  Service: ENT;  Laterality: Bilateral;  suction bovie, nasopore, endoscopes    CONTROL OF EPISTAXIS, POSTERIOR, USING NASAL PACKING OR CAUTERIZATION Bilateral 12/30/2024    Procedure: CONTROL OF EPISTAXIS, POSTERIOR, USING NASAL PACKING OR CAUTERIZATION;  Surgeon: Jono Russell MD;  [facility];  Service: ENT;  Laterality: Bilateral;    CORONARY ANGIOGRAPHY N/A 10/04/2021    Procedure: Left heart cath +/- peripheral angiogram;  Surgeon: Jose Ruiz MD;  Location: [facility] CATH LAB;  Service: Cardiology;  Laterality: N/A;    CORONARY ANGIOGRAPHY N/A 3/2/2023    Procedure: Angiogram, Coronary;  Surgeon: Devon Garnica MD;  Location: [facility] CATH LAB;  Service: Cardiology;  Laterality: N/A;    CORONARY ANGIOPLASTY      CORONARY ARTERY BYPASS GRAFT      CORONARY BYPASS GRAFT ANGIOGRAPHY  10/04/2021    Procedure: Bypass graft  study;  Surgeon: Jose Ruiz MD;  Location: [facility] CATH LAB;  Service: Cardiology;;    CORONARY BYPASS GRAFT ANGIOGRAPHY  3/2/2023    Procedure: Bypass graft study;  Surgeon: Devon Garnica MD;  Location: [facility] CATH LAB;  Service: Cardiology;;    ECHOCARDIOGRAM,TRANSESOPHAGEAL N/A 4/14/2023    Procedure: Transesophageal echo (KIRSTEN) intra-procedure log documentation;  Surgeon: [facility] Diagnostic Provider;  Location: [facility] EP LAB;  Service: Cardiology;  Laterality: N/A;    ENDOSCOPIC NASAL CAUTERIZATION Bilateral 11/3/2023    Procedure: CAUTERIZATION, NOSE, ENDOSCOPIC;  Surgeon: Jono Russell MD;  Location: [facility] OR [facility] FLR;  Service: ENT;  Laterality: Bilateral;    ENDOSCOPIC NASAL CAUTERIZATION N/A 12/18/2023    Procedure: CAUTERIZATION, NOSE, ENDOSCOPIC;  Surgeon: Jnoo Russell MD;  Location: [facility] OR [facility] FLR;  Service: ENT;  Laterality: N/A;    ENDOSCOPIC NASAL CAUTERIZATION N/A 2/26/2025    Procedure: CAUTERIZATION, NOSE, ENDOSCOPIC;  Surgeon: Jono Russell MD;  [facility];  Service: ENT;  Laterality: N/A;    ENDOSCOPIC NASAL CAUTERIZATION Bilateral 3/6/2025    Procedure: CAUTERIZATION, NOSE, ENDOSCOPIC;  Surgeon: Jono Russell MD;  [facility];  Service: ENT;  Laterality: Bilateral;  If he has an outpatient case request for same day, use the inpatient one instead.    EYE SURGERY      FESS, WITH IMAGING GUIDANCE Left 2/28/2024    Procedure: FESS, WITH IMAGING GUIDANCE;  Surgeon: Jono Russell MD;  [facility];  Service: ENT;  Laterality: Left;  Scan loaded ENT [employer]tr[employer]. CL    FLUOROSCOPY Bilateral 10/29/2024    Procedure: Fluoroscopy;  Surgeon: Sameer Espitia MD;  Location: [facility] CATH LAB/EP;  Service: Cardiology;  Laterality: Bilateral;  fluoroscopy of the mechanical aortic valve    FUNCTIONAL ENDOSCOPIC SINUS SURGERY (FESS) Bilateral 6/14/2023    Procedure: FESS (FUNCTIONAL ENDOSCOPIC SINUS SURGERY);  Surgeon: Jono Russell MD;  Location: [facility]  2ND FLR;  Service: ENT;  Laterality: Bilateral;    HERNIA REPAIR      INTRAOCULAR PROSTHESES INSERTION Left 11/13/2022    Procedure: INSERTION, IOL PROSTHESIS;  Surgeon: Alia Mckeon MD;  Location: [facility] OR 1ST FLR;  Service: Ophthalmology;  Laterality: Left;    INTRAOCULAR PROSTHESES INSERTION Right 12/04/2022    Procedure: INSERTION, IOL PROSTHESIS;  Surgeon: Alia Mckeon MD;  Location: [facility] OR 1ST FLR;  Service: Ophthalmology;  Laterality: Right;    LIGATION, ARTERY, ETHMOIDAL Left 2/28/2024    Procedure: LIGATION, ARTERY, ETHMOIDAL;  Surgeon: Jono Russell MD;  Location: [facility] OR 2ND FLR;  Service: ENT;  Laterality: Left;    LIGATION, ARTERY, SPHENOPALATINE, ENDOSCOPIC Bilateral 6/14/2023    Procedure: LIGATION, ARTERY, SPHENOPALATINE, ENDOSCOPIC;  Surgeon: Jono Russell MD;  Location: [facility] OR 2ND FLR;  Service: ENT;  Laterality: Bilateral;    NASAL ENDOSCOPY N/A 8/8/2024    Procedure: ENDOSCOPY, NOSE;  Surgeon: Jono Russell MD;  Location: [facility] OR 2ND FLR;  Service: ENT;  Laterality: N/A;    PHACOEMULSIFICATION OF CATARACT Left 11/13/2022    Procedure: PHACOEMULSIFICATION, CATARACT;  Surgeon: Alia Mckeon MD;  [facility];  Service: Ophthalmology;  Laterality: Left;    PHACOEMULSIFICATION OF CATARACT Right 12/04/2022    Procedure: PHACOEMULSIFICATION, CATARACT;  Surgeon: Alia Mckeon MD;  [facility];  Service: Ophthalmology;  Laterality: Right;    SPINE SURGERY      TRANSESOPHAGEAL ECHOCARDIOGRAPHY N/A 3/22/2023    Procedure: ECHOCARDIOGRAM, TRANSESOPHAGEAL;  Surgeon: [facility] Diagnostic Provider;  Location: [facility] EP LAB;  Service: Anesthesiology;  Laterality: N/A;    TRANSESOPHAGEAL ECHOCARDIOGRAPHY N/A 4/11/2023    Procedure: ECHOCARDIOGRAM, TRANSESOPHAGEAL;  Surgeon: [facility] Diagnostic Provider;  Location: [facility] EP LAB;  Service: Anesthesiology;  Laterality: N/A;    VASECTOMY         Time Tracking:     OT Date of Treatment: 07/29/25  OT Start Time: 1439  OT  Stop Time: 1455  OT Total Time (min): 16 min    Billable Minutes:Evaluation 8  Self Care/Home Management 8    7/29/2025

## 2025-07-29 NOTE — NURSING TRANSFER
Nursing Transfer Note      7/29/2025   12:03 PM    Nurse giving handoff:RADHA Rodrigues PACU  Nurse receiving handoff:RADHA Duncan     Reason patient is being transferred: Post op care     Transfer To: 1123    Transfer via stretcher    Transfer with N/A    Transported by PCT    Additional Lines: N/A    Medicines sent: None    Any special needs or follow-up needed: Routine care     Patient belongings transferred with patient: No    Chart send with patient: Yes    Notified: spouse    Patient reassessed at: 1145 7/29/25

## 2025-07-29 NOTE — SUBJECTIVE & OBJECTIVE
Interval History: NAEON. AFVSS. Bilateral merocels in place. No bleeding reported. Patient resting comfortably    Medications:  Continuous Infusions:  Scheduled Meds:   allopurinoL  100 mg Oral Daily    amoxicillin-clavulanate 250-125mg  2 tablet Oral Q12H    atorvastatin  40 mg Oral Daily    ferrous gluconate  324 mg Oral Daily with breakfast    isosorbide mononitrate  60 mg Oral QHS    QUEtiapine  50 mg Oral QHS    sodium chloride  1 spray Each Nostril Q4H     PRN Meds:  Current Facility-Administered Medications:     0.9%  NaCl infusion (for blood administration), , Intravenous, Q24H PRN    0.9%  NaCl infusion (for blood administration), , Intravenous, Q24H PRN    acetaminophen, 1,000 mg, Oral, Q8H PRN    acetaminophen, 650 mg, Oral, Q4H PRN    dextrose 50%, 12.5 g, Intravenous, PRN    dextrose 50%, 25 g, Intravenous, PRN    glucagon (human recombinant), 1 mg, Intramuscular, PRN    glucose, 16 g, Oral, PRN    glucose, 24 g, Oral, PRN    HYDROmorphone, 1 mg, Intravenous, Q4H PRN    naloxone, 0.02 mg, Intravenous, PRN    ondansetron, 4 mg, Intravenous, Q8H PRN    oxymetazoline, 2 spray, Each Nostril, BID PRN    sodium chloride, 1 spray, Each Nostril, PRN    sodium chloride 0.9%, 10 mL, Intravenous, Q12H PRN     Review of patient's allergies indicates:   Allergen Reactions    Lisinopril     Losartan      Intolerance- elevates potassium level     Objective:     Vital Signs (24h Range):  Temp:  [96.9 °F (36.1 °C)-97.7 °F (36.5 °C)] 97.7 °F (36.5 °C)  Pulse:  [61-65] 62  Resp:  [16-18] 16  SpO2:  [95 %-99 %] 97 %  BP: (101-159)/(41-72) 147/63       Lines/Drains/Airways       Peripheral Intravenous Line  Duration             Peripheral IV Single Lumen 07/27/25 1510 20 G Anterior;Distal;Left Upper Arm 1 day    Peripheral IV Single Lumen 07/27/25 1618 20 G Posterior;Right Forearm 1 day                     Physical Exam   NAD  Awake and alert  Head atraumatic   Bilateral merocels in place  Clot in posterior oropharynx, no  active bleeding   Normal WOB, no stridor or stertor    Significant Labs:  CBC:   Recent Labs   Lab 07/29/25  0345   WBC 4.36   RBC 2.47*   HGB 7.4*   HCT 23.3*      MCV 94   MCH 30.0   MCHC 31.8*     CMP:   Recent Labs   Lab 07/27/25  1512 07/28/25  0547 07/29/25  0345      < > 68*   CALCIUM 9.7   < > 9.0   ALBUMIN 3.2*  --   --    PROT 7.4  --   --       < > 138   K 4.4   < > 4.2   CO2 23   < > 18*      < > 112*   BUN 47*   < > 54*   CREATININE 2.5*   < > 2.4*   ALKPHOS 92  --   --    ALT 41  --   --    AST 51*  --   --    BILITOT 0.5  --   --     < > = values in this interval not displayed.       Significant Diagnostics:  None

## 2025-07-29 NOTE — PLAN OF CARE
Plan of care    Mr. Laguerre underwent nasal endoscopy with control of hemorrhage without complication. Merocels removed. No additional nasal packing placed. Transferred to PACU with plans to return to floor    -- Packing is removed, OK to stop antibiotic ppx   -- Monitor for further bleeding  -- Continue nasal saline spray QID  -- Rest of care per primary  -- Please page or call ENT on-call with further questions or concerns

## 2025-07-29 NOTE — CONSULTS
Pharmacy to review dose of warfarin  Dariel Urbina is a 84 y.o. male on warfarin therapy for mechanical AVR, Acarlos Motta PharmD has been consulted for warfarin dosing.    Current order: none  Home dose: Warfarin 5 mg Sunday, Tuesday, Thursday; Warfarin 2.5 mg all other days   Coumadin clinic enrollment: Active  INR goal: 2.0-2.5    Lab Results   Component Value Date    INR 3.0 (H) 07/29/2025    INR 2.9 (H) 07/28/2025    INR 3.1 (H) 07/27/2025    PROTIME 30.6 (H) 07/29/2025    PROTIME 29.3 (H) 07/28/2025    PROTIME 31.2 (H) 07/27/2025     Significant drug interactions:Allopurinol can increased anticoagulant effect of Warfarin   Diet: NPO except for medication      Recommendation(s):   INR still above goal- warfarin still held  Daily INR  Pharmacy will continue to follow and monitor warfarin      Thank you for the consult,  Payton Leavitt  Extension [phone]    **Note: Consults are reviewed Monday-Friday 7:00am-3:30pm. The above recommendations are only suggested. The recommendations should be considered in conjunction with all patient factors.**

## 2025-07-29 NOTE — ASSESSMENT & PLAN NOTE
Patient with known CAD s/p CABG, which is controlled Will continue Statin and monitor for S/Sx of angina/ACS. Continue to monitor on telemetry.

## 2025-07-29 NOTE — TRANSFER OF CARE
"Anesthesia Transfer of Care Note    Patient: Dariel Urbina    Procedure(s) Performed: Procedure(s) (LRB):  ENDOSCOPY, NOSE (Bilateral)  CAUTERIZATION, NOSE (Bilateral)    Patient location: PACU    Anesthesia Type: general    Transport from OR: Transported from OR on room air with adequate spontaneous ventilation    Post pain: adequate analgesia    Post assessment: no apparent anesthetic complications and tolerated procedure well    Post vital signs: stable    Level of consciousness: awake, alert and oriented    Nausea/Vomiting: no nausea/vomiting    Complications: none    Transfer of care protocol was followed      Last vitals: Visit Vitals  BP (!) 161/71   Pulse 65   Temp 36.4 °C (97.5 °F) (Temporal)   Resp 18   Ht 5' 5" (1.651 m)   Wt 65 kg (143 lb 4.8 oz)   SpO2 99%   BMI 23.85 kg/m²     "

## 2025-07-29 NOTE — PROGRESS NOTES
[facility] - Internal Medicine Telemetry  Otorhinolaryngology-Head & Neck Surgery  Progress Note    Subjective:     Post-Op Info:  Procedure(s) (LRB):  CONTROL OF HEMORRHAGE, NASOPHARYNX, COMPLICATED (Bilateral)      Hospital Day: 3     Interval History: NAEON. AFVSS. Bilateral merocels in place. No bleeding reported. Patient resting comfortably    Medications:  Continuous Infusions:  Scheduled Meds:   allopurinoL  100 mg Oral Daily    amoxicillin-clavulanate 250-125mg  2 tablet Oral Q12H    atorvastatin  40 mg Oral Daily    ferrous gluconate  324 mg Oral Daily with breakfast    isosorbide mononitrate  60 mg Oral QHS    QUEtiapine  50 mg Oral QHS    sodium chloride  1 spray Each Nostril Q4H     PRN Meds:  Current Facility-Administered Medications:     0.9%  NaCl infusion (for blood administration), , Intravenous, Q24H PRN    0.9%  NaCl infusion (for blood administration), , Intravenous, Q24H PRN    acetaminophen, 1,000 mg, Oral, Q8H PRN    acetaminophen, 650 mg, Oral, Q4H PRN    dextrose 50%, 12.5 g, Intravenous, PRN    dextrose 50%, 25 g, Intravenous, PRN    glucagon (human recombinant), 1 mg, Intramuscular, PRN    glucose, 16 g, Oral, PRN    glucose, 24 g, Oral, PRN    HYDROmorphone, 1 mg, Intravenous, Q4H PRN    naloxone, 0.02 mg, Intravenous, PRN    ondansetron, 4 mg, Intravenous, Q8H PRN    oxymetazoline, 2 spray, Each Nostril, BID PRN    sodium chloride, 1 spray, Each Nostril, PRN    sodium chloride 0.9%, 10 mL, Intravenous, Q12H PRN     Review of patient's allergies indicates:   Allergen Reactions    Lisinopril     Losartan      Intolerance- elevates potassium level     Objective:     Vital Signs (24h Range):  Temp:  [96.9 °F (36.1 °C)-97.7 °F (36.5 °C)] 97.7 °F (36.5 °C)  Pulse:  [61-65] 62  Resp:  [16-18] 16  SpO2:  [95 %-99 %] 97 %  BP: (101-159)/(41-72) 147/63       Lines/Drains/Airways       Peripheral Intravenous Line  Duration             Peripheral IV Single Lumen 07/27/25 1510 20 G Anterior;Distal;Left  Upper Arm 1 day    Peripheral IV Single Lumen 07/27/25 1618 20 G Posterior;Right Forearm 1 day                     Physical Exam   NAD  Awake and alert  Head atraumatic   Bilateral merocels in place  Clot in posterior oropharynx, no active bleeding   Normal WOB, no stridor or stertor    Significant Labs:  CBC:   Recent Labs   Lab 07/29/25  0345   WBC 4.36   RBC 2.47*   HGB 7.4*   HCT 23.3*      MCV 94   MCH 30.0   MCHC 31.8*     CMP:   Recent Labs   Lab 07/27/25  1512 07/28/25  0547 07/29/25  0345      < > 68*   CALCIUM 9.7   < > 9.0   ALBUMIN 3.2*  --   --    PROT 7.4  --   --       < > 138   K 4.4   < > 4.2   CO2 23   < > 18*      < > 112*   BUN 47*   < > 54*   CREATININE 2.5*   < > 2.4*   ALKPHOS 92  --   --    ALT 41  --   --    AST 51*  --   --    BILITOT 0.5  --   --     < > = values in this interval not displayed.       Significant Diagnostics:  None  Assessment/Plan:     Recurrent epistaxis  85 yo male with heart valve on Warfarin and recurrent epistaxis presents to ER due to left sided epistaxis. Currently packed with bilateral merocels. Hb 7.1 in ED, receiving 1u pRBC and admitted to medicine.     07/29/2025: Hemostatic today. OR today for nasal endoscopy and control of hemorrhage. Please attempt to transfuse 2u pRBC prior to OR    - Transfuse 2u pRBC to optimize H/H prior to OR  - Keep NPO until OR  - OK for diet per primary after OR  - Maintain bilateral merocel  - Please place patient on anti-staph antibiotics while packing is in place  - Afrin to bilateral nares q8h for the next 48h  - May place mustache dressing under nose for trickling, ok if dressing becomes saturated with some red/brown drainage  - Nasal saline q4h while packing in place, even on side with nasal packing. Can continue nasal saline sprays to keep mucosa moist to prevent future bleeding.  - Keep head of bed elevated  - Apply Afrin to affected nasal cavity if epistaxis recurs  - Please call ENT with  questions  - Remainder of care per primary team           Kyle Berg MD  Otorhinolaryngology-Head & Neck Surgery  [facility] - Internal Medicine Telemetry

## 2025-07-29 NOTE — PLAN OF CARE
Problem: Hospitalized Older Adult  Goal: Optimal Cognitive Function  Outcome: Progressing  Goal: Effective Bowel Elimination  Outcome: Progressing  Goal: Optimal Coping  Outcome: Progressing  Goal: Fluid and Electrolyte Balance  Outcome: Progressing  Goal: Optimal Functional Ability  Outcome: Progressing  Goal: Improved Oral Intake  Outcome: Progressing  Goal: Adequate Sleep/Rest  Outcome: Progressing  Goal: Effective Urinary Elimination  Outcome: Progressing   Pt receiving 1st unit of prbc at this time. Tolerating well. Pt has no signs of skin breakdown. 4 eyes done this shift. Changed into gown all armbands and fall risk socks on. Pt resting and nasal packing in place.

## 2025-07-29 NOTE — BRIEF OP NOTE
[facility] - Surgery ([facility])  Brief Operative Note    SUMMARY     Surgery Date: 7/29/2025     Surgeons and Role:     * Jono Russell MD - Primary     * Kyle Berg MD - Resident - Assisting        Pre-op Diagnosis:  Epistaxis [R04.0]    Post-op Diagnosis:  Post-Op Diagnosis Codes:     * Epistaxis [R04.0]    Procedure(s) (LRB):  ENDOSCOPY, NOSE (Bilateral)  CAUTERIZATION, NOSE (Bilateral)    Anesthesia: General    Implants:  * No implants in log *    Operative Findings: Multiple areas of bleeding controlled coblator and suction bovie. Awoken and taken to PACU without complication with plans to return to floor.    Estimated Blood Loss: * No values recorded between 7/29/2025 10:15 AM and 7/29/2025 11:26 AM *    Estimated Blood Loss has been documented.         Specimens:   Specimen (24h ago, onward)      None          * No specimens in log *    [id number]

## 2025-07-29 NOTE — ANESTHESIA PROCEDURE NOTES
Intubation    Date/Time: 7/29/2025 10:34 AM    Performed by: Regi Gauthier CRNA  Authorized by: Liborio Gonzalez MD    Intubation:     Induction:  Rapid sequence induction    Intubated:  Postinduction    Mask Ventilation:  Not attempted    Attempts:  1    Attempted By:  Student    Method of Intubation:  Video laryngoscopy    Blade:  Osorio 3    Laryngeal View Grade: Grade I - full view of cords      Difficult Airway Encountered?: No      Complications:  None    Airway Device:  Oral endotracheal tube    Airway Device Size:  7.5    Style/Cuff Inflation:  Cuffed (inflated to minimal occlusive pressure)    Tube secured:  23    Secured at:  The lips    Placement Verified By:  Capnometry    Complicating Factors:  None    Findings Post-Intubation:  BS equal bilateral and atraumatic/condition of teeth unchanged

## 2025-07-29 NOTE — PLAN OF CARE
Problem: Adult Inpatient Plan of Care  Goal: Plan of Care Review  7/29/2025 1845 by Breanna Castro, RN  Outcome: Not Progressing     Problem: Adult Inpatient Plan of Care  Goal: Optimal Comfort and Wellbeing  7/29/2025 1845 by Breanna Castro, RN  Outcome: Not Progressing     Problem: Anemia  Goal: Anemia Symptom Improvement  Outcome: Progressing

## 2025-07-29 NOTE — NURSING
Departed unit for procedure.Accompanied by nursing staff. 1 unit of RBC transfusing. No acute issues present.

## 2025-07-29 NOTE — ASSESSMENT & PLAN NOTE
Anemia is likely due to acute blood loss which was from epistaxis and chronic disease due to Chronic Kidney Disease. Most recent hemoglobin and hematocrit are listed below.  Recent Labs     07/27/25  1512 07/28/25  0547 07/29/25  0345   HGB 7.1* 7.6* 7.4*   HCT 22.5* 24.1* 23.3*     Plan  - Monitor serial CBC: Daily  - Transfuse PRBC if patient becomes hemodynamically unstable, symptomatic or H/H drops below 7/21.  - Patient has received 1 units of PRBCs on 07/27  - Patient's anemia is currently stable

## 2025-07-29 NOTE — OP NOTE
7/29/2025    DATE OF PROCEDURE: 7/29/2025     PREOPERATIVE DIAGNOSES:   Epistaxis [R04.0]    POSTOPERATIVE DIAGNOSES:   Epistaxis [R04.0]    SURGEON:  Surgeons and Role:     * Jono Russell MD - Primary     * Kyle Berg MD - Resident - Assisting      PROCEDURES PERFORMED:   Debridement of nasal cavity and paranasal sinuses, bilaterally.  Nasal endoscopy and control of epistaxis using electrocautery, bilaterally.    ANESTHESIA: General      INDICATIONS FOR PROCEDURE:   Dariel Urbina is a 84 y.o. well known to me in the ENT department for continued management of his epistaxis.  In brief has had multiple endoscopic ligations along with endovascular embolizations for management of his epistaxis.  He has been recently doing well but had large volume bleeding that was unable to be stopped by patient.  He was seen and evaluated in the ER where bilateral middle Merocel packs were placed to control his bleeding.  Given his history and recurrent epistaxis with need for long-term anticoagulation given his mechanical heart valve he was advised to undergo repeat nasal endoscopy control in the OR for further treatment of his epistaxis.    He was apprised of the risks, benefits and alternatives to surgery.  In spite of the risk inherent to surgery,He provided informed consent for the aforementioned procedures.     PROCEDURE IN DETAIL:  The patient was taken to the operating room and placed on the operating table in the supine position.  General endotracheal anesthesia was induced by the anesthesia team.     The patient was prepped and draped in usual sterile fashion.  A preoperative time-out was taken to confirm the patient and procedure being performed.    The right Merocel was 1st removed from the nose.  A 0 degree endoscope was used to assess the right nasal cavity and paranasal sinuses.  Copious amounts of blood products were removed from the paranasal sinuses.  Afrin-soaked pledgets were placed in the right  nasal cavity.  Next the left Merocel was removed copious bleeding arterial in nature was seen along the anterior right septum.  Afrin plus soaked pledgets were placed in order to achieve hemostasis.  Old blood products was removed from the paranasal sinuses and nasal cavity.    A Coblator was used to cauterize the arterial bleeding along the right nasal septum.  There were multiple other sites of bleeding along the lateral nasal wall on the left and posterior septum there were similarly cauterized.  The right nasal cavity was then reassessed where multiple sites along the septum were similarly cauterized to control bleeding.    Hemostasis was confirmed.  The nose was cleared of any old blood products.  Puragel was then placed along the mucosal edges specifically at the sites of cautery to assist with wound healing and hemostasis.    At this point the procedure deemed to be complete.  He was then awakened from general anesthesia and transferred to the recovery room in a stable condition at time of dictation.    There were no intraoperative complications.  I was present for and participated in the entire procedure as dictated above.       ESTIMATED BLOOD LOSS: 10cc    SPECIMENS:   Specimen (24h ago, onward)      None          Jono Russell MD

## 2025-07-29 NOTE — ANESTHESIA POSTPROCEDURE EVALUATION
Anesthesia Post Evaluation    Patient: Dariel Urbina    Procedure(s) Performed: Procedure(s) (LRB):  ENDOSCOPY, NOSE (Bilateral)  CAUTERIZATION, NOSE (Bilateral)    Final Anesthesia Type: general      Patient location during evaluation: PACU  Patient participation: Yes- Able to Participate  Level of consciousness: awake  Post-procedure vital signs: reviewed and stable  Pain management: adequate  Airway patency: patent    PONV status at discharge: No PONV  Anesthetic complications: no      Cardiovascular status: blood pressure returned to baseline  Respiratory status: unassisted  Hydration status: euvolemic  Follow-up not needed.              Vitals Value Taken Time   /62 07/29/25 15:08   Temp 36.4 °C (97.5 °F) 07/29/25 15:08   Pulse 77 07/29/25 15:08   Resp 18 07/29/25 15:08   SpO2 96 % 07/29/25 15:08         Event Time   Out of Recovery 07/29/2025 11:45:00         Pain/Sanjeev Score: Sanjeev Score: 9 (7/29/2025 12:00 PM)

## 2025-07-29 NOTE — PROGRESS NOTES
[facility] - Internal Medicine [facility] Medicine  Progress Note    Patient Name: Dariel Urbina  MRN: [id number]  Patient Class: OP- Observation   Admission Date: 7/27/2025  Length of Stay: 0 days  Attending Physician: Octavio Santos MD  Primary Care Provider: Devon Langston Jr., MD        Subjective     Principal Problem:Epistaxis        HPI:  Mr. Urbina is an 85 yo M with CAD s/p CABG in 1990's, mechanical AVR (on warfarin c/b long standing epistaxis) c/b mod-severe MR gayatri/ mitraclip, afib, GIB 2/2 diverticulosis s/p partial colon resection, T2DM (diet controlled), gout, HLD, HTN, CKD4 (Cr 2.3-2.5) presenting with an episode of epistaxis that started today at 7am. He felt weak, but no HA, vision changes, SOB, no new chest pain, no other overt bleeding nor stool changes, no LE edema.     In the ED he is afebrile and hemodynamically stable on RA with anemia to 7.1, Cr 2.5 (at baseline), INR 3.1. 1 u pRBC ordered, TXA ordered, and ENT consulted for nasal packing and recommendations for epistaxis management. Admitted to Hospital Medicine for evaluation of epistaxis in patient with mechanical AVR.     Overview/Hospital Course:  Patient admitted for recurrent epistaxis in setting of warfarin use. INR 3.1. Warfarin held and ENT consulted. Pharmacy consulted for warfarin administration. Nose packed by ENT; Hgb stable. Patient to OR with ENT on 7/29 for nasal cauterization.    Interval History: Pt seen and examined this morning on kaylee. PALLAVI. Hgb stable. Plan for OR with ENT on today for nasal cauterization. INR 3.0; continue holding warfarin. Patient without any complaints.    Objective:     Vital Signs (Most Recent):  Temp: 98.2 °F (36.8 °C) (07/29/25 0739)  Pulse: 62 (07/29/25 0739)  Resp: 17 (07/29/25 0739)  BP: 139/69 (07/29/25 0739)  SpO2: 96 % (07/29/25 0739) Vital Signs (24h Range):  Temp:  [97.5 °F (36.4 °C)-98.2 °F (36.8 °C)] 98.2 °F (36.8 °C)  Pulse:  [61-65] 62  Resp:  [16-18] 17  SpO2:  [95  %-98 %] 96 %  BP: (101-153)/(41-72) 139/69     Weight: 65 kg (143 lb 4.8 oz)  Body mass index is 23.85 kg/m².    Intake/Output Summary (Last 24 hours) at 7/29/2025 0828  Last data filed at 7/28/2025 1746  Gross per 24 hour   Intake 480 ml   Output --   Net 480 ml         Physical Exam  Vitals and nursing note reviewed.   Constitutional:       Appearance: He is ill-appearing (chronically).   HENT:      Head: Normocephalic and atraumatic.      Nose:      Comments: Dried blood in nares, nasal packing in place  Eyes:      Conjunctiva/sclera: Conjunctivae normal.   Cardiovascular:      Rate and Rhythm: Normal rate and regular rhythm.      Pulses: Normal pulses.   Pulmonary:      Effort: Pulmonary effort is normal. No respiratory distress.      Breath sounds: No wheezing or rales.   Abdominal:      Palpations: Abdomen is soft.      Tenderness: There is no abdominal tenderness. There is no guarding.   Musculoskeletal:      Cervical back: Normal range of motion.      Right lower leg: No edema.      Left lower leg: No edema.   Skin:     General: Skin is warm and dry.   Neurological:      General: No focal deficit present.      Mental Status: He is alert and oriented to person, place, and time.   Psychiatric:         Mood and Affect: Mood normal.           Significant Labs: All pertinent labs within the past 24 hours have been reviewed.    Significant Imaging: I have reviewed all pertinent imaging results/findings within the past 24 hours.      Assessment & Plan  Epistaxis  Chronic anticoagulation  H/O mechanical aortic valve replacement  - appreciated ENT evaluation in the ED, follow up recs   - Plan for OR for nasal cauterization 7/29  - s/p TXA and 1 u pRBC ordered in ED  - given INR 3 - hold coumadin  - Pharmacy consult for warfarin dosing     Acute blood loss anemia  Recurrent epistaxis  Anemia of chronic renal failure    Anemia is likely due to acute blood loss which was from epistaxis and chronic disease due to Chronic  Kidney Disease. Most recent hemoglobin and hematocrit are listed below.  Recent Labs     07/27/25  1512 07/28/25  0547 07/29/25  0345   HGB 7.1* 7.6* 7.4*   HCT 22.5* 24.1* 23.3*     Plan  - Monitor serial CBC: Daily  - Transfuse PRBC if patient becomes hemodynamically unstable, symptomatic or H/H drops below 7/21.  - Patient has received 1 units of PRBCs on 07/27  - Patient's anemia is currently stable      Hyperlipidemia associated with type 2 diabetes mellitus  - transitioned to formulary statin    Type 2 diabetes mellitus with stage 4 chronic kidney disease, without long-term current use of insulin  Creatine stable for now. BMP reviewed- noted Estimated Creatinine Clearance: 20.3 mL/min (A) (based on SCr of 2.4 mg/dL (H)). according to latest data. 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  Monitor UOP and serial BMP and adjust therapy as needed. Renally dose meds. Avoid nephrotoxic medications and procedures.  Hypertension associated with diabetes  - Resume home Imdur    Coronary artery disease involving native coronary artery of native heart without angina pectoris  Patient with known CAD s/p CABG, which is controlled Will continue Statin and monitor for S/Sx of angina/ACS. Continue to monitor on telemetry.     Paroxysmal atrial fibrillation  Patient has long standing persistent (>12 months) atrial fibrillation. Patient is currently in sinus rhythm. [id number] Score: 4. The patients heart rate in the last 24 hours is as follows:  Pulse  Min: 65  Max: 67      Antiarrhythmics     Anticoagulants     Plan  - Replete lytes with a goal of K>4, Mg >2  - Holding warfarin in setting of epistaxis; currently therapeutic  - Patient's afib is currently controlled    Chronic kidney disease, stage 4 (severe)  - Cr baseline at admission  - Renally dosing all medications  - Avoiding nephrotoxins  - Will continue to monitor on daily labs      VTE Risk Mitigation (From admission, onward)           Ordered     IP  VTE HIGH RISK PATIENT  Once         07/27/25 1640     Place sequential compression device  Until discontinued         07/27/25 1640     Reason for No Pharmacological VTE Prophylaxis  Once        Question:  Reasons:  Answer:  Active Bleeding    07/27/25 1640                    Discharge Planning   ASTER: 7/31/2025     Code Status: Full Code   Medical Readiness for Discharge Date:   Discharge Plan A: Home with family, Home Health                  Please place Justification for DME      Octavio Santos MD  Department of Hospital Medicine   [facility] - Internal Medicine Telemetry

## 2025-07-29 NOTE — SUBJECTIVE & OBJECTIVE
Interval History: Pt seen and examined this morning on shahida OLIVO. Hgb stable. Plan for OR with ENT on today for nasal cauterization. INR 3.0; continue holding warfarin. Patient without any complaints.    Objective:     Vital Signs (Most Recent):  Temp: 98.2 °F (36.8 °C) (07/29/25 0739)  Pulse: 62 (07/29/25 0739)  Resp: 17 (07/29/25 0739)  BP: 139/69 (07/29/25 0739)  SpO2: 96 % (07/29/25 0739) Vital Signs (24h Range):  Temp:  [97.5 °F (36.4 °C)-98.2 °F (36.8 °C)] 98.2 °F (36.8 °C)  Pulse:  [61-65] 62  Resp:  [16-18] 17  SpO2:  [95 %-98 %] 96 %  BP: (101-153)/(41-72) 139/69     Weight: 65 kg (143 lb 4.8 oz)  Body mass index is 23.85 kg/m².    Intake/Output Summary (Last 24 hours) at 7/29/2025 0828  Last data filed at 7/28/2025 1746  Gross per 24 hour   Intake 480 ml   Output --   Net 480 ml         Physical Exam  Vitals and nursing note reviewed.   Constitutional:       Appearance: He is ill-appearing (chronically).   HENT:      Head: Normocephalic and atraumatic.      Nose:      Comments: Dried blood in nares, nasal packing in place  Eyes:      Conjunctiva/sclera: Conjunctivae normal.   Cardiovascular:      Rate and Rhythm: Normal rate and regular rhythm.      Pulses: Normal pulses.   Pulmonary:      Effort: Pulmonary effort is normal. No respiratory distress.      Breath sounds: No wheezing or rales.   Abdominal:      Palpations: Abdomen is soft.      Tenderness: There is no abdominal tenderness. There is no guarding.   Musculoskeletal:      Cervical back: Normal range of motion.      Right lower leg: No edema.      Left lower leg: No edema.   Skin:     General: Skin is warm and dry.   Neurological:      General: No focal deficit present.      Mental Status: He is alert and oriented to person, place, and time.   Psychiatric:         Mood and Affect: Mood normal.           Significant Labs: All pertinent labs within the past 24 hours have been reviewed.    Significant Imaging: I have reviewed all pertinent  imaging results/findings within the past 24 hours.

## 2025-07-29 NOTE — PT/OT/SLP EVAL
"Physical Therapy Co-Evaluation    Patient Name:  Dariel Urbina   MRN:  [id number]  Admit Date: 7/27/2025  Admitting Diagnosis:  Epistaxis   Length of Stay: 0 days  Recent Surgery: Procedure(s) (LRB):  ENDOSCOPY, NOSE (Bilateral)  CAUTERIZATION, NOSE (Bilateral) * Day of Surgery *    Recommendations:     Discharge Recommendations: No Therapy Indicated  Discharge Equipment Recommendations: none   Barriers to discharge: None    Assessment:     Dariel Urbina is a 84 y.o. male admitted with a medical diagnosis of Epistaxis. He presents with impaired functional mobility, gait instability, impaired balance.    Pt found supine agreeable to therapy session. Pt tolerated session well completing mobility in room with little to no (A) required. Pt has good potential to progress towards his goals with increased mobility.     Patient would benefit from skilled therapy services to maximize safety and independence, increase activity tolerance, decrease fall risk, decrease caregiver burden, improve QOL, improve patient's functional mobility, and decrease risk of contractures and pressure sores.  Rehab Prognosis: Good; patient would benefit from acute skilled PT services to address these deficits and reach maximum level of function.      Plan:     During this hospitalization, patient to be seen 3 x/week to address the identified rehab impairments via gait training, therapeutic activities, therapeutic exercises, neuromuscular re-education and progress towards the established goals.  Plan of Care Expires:  08/21/25  Plan of Care Reviewed with: patient, spouse  Subjective     RN notified prior to session. Spouse present upon PT entrance into room. Patient agreeable to PT evaluation.    Chief Complaint: "I just want to sleep"  Patient/Family Comments/goals: go home  Pain/Comfort:  Pain Rating 1: 0/10  Pain Rating Post-Intervention 1: 0/10    Social History:  Residence: lives with their spouse 1-story house with 1 JULISA and no HR. " "Pt's bathroom has a tub shower with shower chair.  Support available: none, has not needed  Equipment Owned (not using): cane, straight, wheelchair  Equipment Used: None  Prior level of function: independent; uses facility w/c for medical appts for longer distances    Patient reports they will have assistance from spouse upon discharge.    Objective:     Additional staff present: OT; OT for co-evaluation due to suspected patient need for two skilled therapists to appropriately assess patient's functional deficits as well as ensure patient safety, accommodate for limited activity tolerance, and provide appropriate, skilled assistance to maximize functional potential during evaluation.    Patient found supine with: bed alarm     General Precautions: Standard, Cardiac fall   Orthopedic Precautions:N/A   Braces: N/A   Body mass index is 23.85 kg/m².  Oxygen Device: Room Air  Vitals: BP (!) 154/57   Pulse 66   Temp 97.5 °F (36.4 °C) (Oral)   Resp 13   Ht 5' 5" (1.651 m)   Wt 65 kg (143 lb 4.8 oz)   SpO2 97%   BMI 23.85 kg/m²     Exams:    Cognition:  Alert and Cooperative  Command following: Follows multistep verbal commands  Communication: clear/fluent    Sensation:   Light touch sensation: Intact BLEs    Gross Motor Coordination: No deficits noted during functional mobility tasks     Edema/Skin Integrity: None noted; Visible skin intact    Postural examination/scapula alignment: Rounded shoulder and Slouched posture    Lower Extremity Range of Motion:  Right Lower Extremity: WFL  Left Lower Extremity: WFL    Lower Extremity Strength:  Right Lower Extremity: WFL  Left Lower Extremity: WFL      Functional Mobility:    Bed Mobility:  Scooting with stand by assistance  Supine > Sit with stand by assistance    Transfers:   Sit <> Stand Transfer: Stand-by Assistance x 1 trials from EOB and toilet with no AD              Gait:  Distance: 10' x2 trials   Assistance level: Contact Guard Assistance during first trial; SBA " during second trial  Assistive Device: none  Gait Deviation(s): occasional unsteady gait, narrow base of support, and decreased alcides  all lines remained intact throughout ambulation trial  Comments: Pt with mild instability during first trial but improved balance in second trial.     Balance:  Sitting Balance  Static: stand by assistance  Dynamic: stand by assistance  Standing:  Static: stand by assistance  Dynamic: stand by assistance    Treatment and Education:    Time provided for education, counseling and discussion of health disposition in regards to patient's current status  All questions answered within PT scope of practice and to patient's satisfaction  PT role in POC to address current functional deficits  Call nursing/pct to transfer to chair/use bathroom. Pt stated understanding.    AM-PAC 6 CLICK MOBILITY  Turning over in bed (including adjusting bedclothes, sheets and blankets)?: 3  Sitting down on and standing up from a chair with arms (e.g., wheelchair, bedside commode, etc.): 3  Moving from lying on back to sitting on the side of the bed?: 3  Moving to and from a bed to a chair (including a wheelchair)?: 3  Need to walk in hospital room?: 3  Climbing 3-5 steps with a railing?: 3  Basic Mobility Total Score: 18     Patient left up in chair with all lines intact, call button in reach, RN notified, and spouse present.    Goals:   Multidisciplinary Problems       Physical Therapy Goals          Problem: Physical Therapy    Goal Priority Disciplines Outcome Interventions   Physical Therapy Goal     PT, PT/OT Progressing    Description: Goals to be met by: 25     Patient will increase functional independence with mobility by performin. Gait  x 300 feet with [place] using No Assistive Device.                          DME Justifications:  No DME recommended requiring DME justifications  History:     Past Medical History:   Diagnosis Date    Anemia of chronic renal failure, stage 3  (moderate) 05/27/2015    Anticoagulant long-term use     Atherosclerosis of coronary artery bypass graft of native heart without angina pectoris 09/11/2012    3-27-18 [facility] Two vessel coronary artery disease.   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    Bilateral carotid artery disease 02/09/2017    Bleeding from the nose     Bleeding nose 03/21/2018    Cancer     Cataract     CHF (congestive heart failure)     CKD (chronic kidney disease) stage 3, GFR 30-59 ml/min 05/27/2015    Claudication of left lower extremity 09/17/2014    Colon polyp     Encounter for blood transfusion     Gastroesophageal reflux disease without esophagitis 03/19/2018    Gastrointestinal hemorrhage associated with intestinal diverticulosis 04/01/2018    Glaucoma     H/O mechanical aortic valve replacement 09/17/2014    History of gout 09/26/2012    Hyperparathyroidism due to renal insufficiency 07/27/2015    Internal hemorrhoid 04/03/2018    Long term current use of anticoagulant therapy 09/26/2012    Mechanical heart valve present     Metabolic acidosis with normal anion gap and bicarbonate losses 03/20/2018    Mixed hyperlipidemia 09/26/2012    NSTEMI (non-ST elevated myocardial infarction) 03/21/2018    Obesity, diabetes, and hypertension syndrome 02/23/2016    Paroxysmal atrial fibrillation 02/06/2023    PVD (peripheral vascular disease) 09/11/2012    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       Past Surgical History:   Procedure Laterality Date    BACK SURGERY      CARDIAC CATHETERIZATION      CARDIAC VALVE REPLACEMENT      CARDIAC VALVE SURGERY      CARPAL TUNNEL RELEASE Right 05/19/2020     Procedure: RELEASE, CARPAL TUNNEL;  Surgeon: Rupesh Norris Jr., MD;  Location: [facility];  Service: Plastics;  Laterality: Right;    CATARACT EXTRACTION Left 11/13/2022        COLON SURGERY      COLONOSCOPY N/A 03/31/2017    Procedure: COLONOSCOPY;  Surgeon: Bruno Raymond MD;  Location: [facility] ENDO (4TH FLR);  Service: Endoscopy;  Laterality: N/A;  Patient's wife requesting date.    COLONOSCOPY N/A 04/03/2018    Procedure: COLONOSCOPY;  Surgeon: Bonifacio Pelletier MD;  Location: [facility] ENDO (2ND FLR);  Service: Endoscopy;  Laterality: N/A;    COLONOSCOPY N/A 08/13/2018    Procedure: COLONOSCOPY;  Surgeon: Kam Barba MD;  Location: [facility] ENDO (2ND FLR);  Service: Endoscopy;  Laterality: N/A;  2nd floor: PA pressure 49; hx of moderate-severe valve disease     per Coumadin clinic-Patient can hold 5 days with lovenox bridge       ok to schedule per Katarina    CONTROL OF EPISTAXIS, POSTERIOR, USING NASAL PACKING OR CAUTERIZATION Bilateral 6/18/2024    Procedure: CONTROL OF EPISTAXIS, POSTERIOR, USING NASAL PACKING OR CAUTERIZATION;  Surgeon: Jono Russell MD;  [address]  Service: ENT;  Laterality: Bilateral;    CONTROL OF EPISTAXIS, POSTERIOR, USING NASAL PACKING OR CAUTERIZATION Bilateral 8/8/2024    Procedure: CONTROL OF EPISTAXIS, POSTERIOR, USING NASAL PACKING OR CAUTERIZATION;  Surgeon: Jono Russell MD;  [address];  Service: ENT;  Laterality: Bilateral;  suction bovie, nasopore, endoscopes    CONTROL OF EPISTAXIS, POSTERIOR, USING NASAL PACKING OR CAUTERIZATION Bilateral 12/30/2024    Procedure: CONTROL OF EPISTAXIS, POSTERIOR, USING NASAL PACKING OR CAUTERIZATION;  Surgeon: Jono Russell MD;  [address];  Service: ENT;  Laterality: Bilateral;    CORONARY ANGIOGRAPHY N/A 10/04/2021    Procedure: Left heart cath +/- peripheral angiogram;  Surgeon: Jose Ruiz MD;  Location: [facility] CATH LAB;  Service: Cardiology;  Laterality: N/A;    CORONARY  ANGIOGRAPHY N/A 3/2/2023    Procedure: Angiogram, Coronary;  Surgeon: Devon Garnica MD;  Location: [facility] CATH LAB;  Service: Cardiology;  Laterality: N/A;    CORONARY ANGIOPLASTY      CORONARY ARTERY BYPASS GRAFT      CORONARY BYPASS GRAFT ANGIOGRAPHY  10/04/2021    Procedure: Bypass graft study;  Surgeon: Jose Ruiz MD;  Location: [facility] CATH LAB;  Service: Cardiology;;    CORONARY BYPASS GRAFT ANGIOGRAPHY  3/2/2023    Procedure: Bypass graft study;  Surgeon: Devon Garnica MD;  Location: [facility] CATH LAB;  Service: Cardiology;;    ECHOCARDIOGRAM,TRANSESOPHAGEAL N/A 4/14/2023    Procedure: Transesophageal echo (KIRSTEN) intra-procedure log documentation;  Surgeon: [facility];  Location: [facility] EP LAB;  Service: Cardiology;  Laterality: N/A;    ENDOSCOPIC NASAL CAUTERIZATION Bilateral 11/3/2023    Procedure: CAUTERIZATION, NOSE, ENDOSCOPIC;  Surgeon: Jono Russell MD;  Location: [facility] OR [facility] FLR;  Service: ENT;  Laterality: Bilateral;    ENDOSCOPIC NASAL CAUTERIZATION N/A 12/18/2023    Procedure: CAUTERIZATION, NOSE, ENDOSCOPIC;  Surgeon: Jono Russell MD;  Location: [facility] OR 2ND FLR;  Service: ENT;  Laterality: N/A;    ENDOSCOPIC NASAL CAUTERIZATION N/A 2/26/2025    Procedure: CAUTERIZATION, NOSE, ENDOSCOPIC;  Surgeon: Jono Russell MD;  Location: [facility] OR 2ND FLR;  Service: ENT;  Laterality: N/A;    ENDOSCOPIC NASAL CAUTERIZATION Bilateral 3/6/2025    Procedure: CAUTERIZATION, NOSE, ENDOSCOPIC;  Surgeon: Jono Russell MD;  Location: [facility] OR 2ND FLR;  Service: ENT;  Laterality: Bilateral;  If he has an outpatient case request for same day, use the inpatient one instead.    EYE SURGERY      FESS, WITH IMAGING GUIDANCE Left 2/28/2024    Procedure: FESS, WITH IMAGING GUIDANCE;  Surgeon: Jono Russell MD;  Location: [facility] OR 2ND FLR;  Service: ENT;  Laterality: Left;  Scan loaded ENT [employer]. CL    FLUOROSCOPY Bilateral 10/29/2024    Procedure: Fluoroscopy;  Surgeon: Sameer Espitia MD;   Location: [facility] CATH LAB/EP;  Service: Cardiology;  Laterality: Bilateral;  fluoroscopy of the mechanical aortic valve    FUNCTIONAL ENDOSCOPIC SINUS SURGERY (FESS) Bilateral 6/14/2023    Procedure: FESS (FUNCTIONAL ENDOSCOPIC SINUS SURGERY);  Surgeon: Jono Russell MD;  Location: [facility] OR 2ND FLR;  Service: ENT;  Laterality: Bilateral;    HERNIA REPAIR      INTRAOCULAR PROSTHESES INSERTION Left 11/13/2022    Procedure: INSERTION, IOL PROSTHESIS;  Surgeon: Alia Mckeon MD;  Location: [facility] OR 1ST FLR;  Service: Ophthalmology;  Laterality: Left;    INTRAOCULAR PROSTHESES INSERTION Right 12/04/2022    Procedure: INSERTION, IOL PROSTHESIS;  Surgeon: Alia Mckeon MD;  Location: [facility] OR 1ST FLR;  Service: Ophthalmology;  Laterality: Right;    LIGATION, ARTERY, ETHMOIDAL Left 2/28/2024    Procedure: LIGATION, ARTERY, ETHMOIDAL;  Surgeon: Jono Russell MD;  Location: [facility] OR 2ND FLR;  Service: ENT;  Laterality: Left;    LIGATION, ARTERY, SPHENOPALATINE, ENDOSCOPIC Bilateral 6/14/2023    Procedure: LIGATION, ARTERY, SPHENOPALATINE, ENDOSCOPIC;  Surgeon: Jono Russell MD;  Location: [facility] OR 2ND FLR;  Service: ENT;  Laterality: Bilateral;    NASAL ENDOSCOPY N/A 8/8/2024    Procedure: ENDOSCOPY, NOSE;  Surgeon: Jono Russell MD;  Location: [facility] OR 2ND FLR;  Service: ENT;  Laterality: N/A;    PHACOEMULSIFICATION OF CATARACT Left 11/13/2022    Procedure: PHACOEMULSIFICATION, CATARACT;  Surgeon: Alia Mckeon MD;  [facility];  Service: Ophthalmology;  Laterality: Left;    PHACOEMULSIFICATION OF CATARACT Right 12/04/2022    Procedure: PHACOEMULSIFICATION, CATARACT;  Surgeon: Alia Mckeon MD;  [facility];  Service: Ophthalmology;  Laterality: Right;    SPINE SURGERY      TRANSESOPHAGEAL ECHOCARDIOGRAPHY N/A 3/22/2023    Procedure: ECHOCARDIOGRAM, TRANSESOPHAGEAL;  Surgeon: [facility] Diagnostic Provider;  Location: [facility] EP LAB;  Service: Anesthesiology;  Laterality: N/A;     TRANSESOPHAGEAL ECHOCARDIOGRAPHY N/A 2023    Procedure: ECHOCARDIOGRAM, TRANSESOPHAGEAL;  Surgeon: Zenia Diagnostic Provider;  Location: [facility] EP LAB;  Service: Anesthesiology;  Laterality: N/A;    VASECTOMY         Family History   Problem Relation Name Age of Onset    Heart failure Mother      Heart disease Mother      Heart failure Father      Heart disease Father      Alcohol abuse Father      Heart failure Brother      Heart disease Brother      Diabetes Brother      No Known Problems Sister      No Known Problems Maternal Grandmother      No Known Problems Maternal Grandfather      No Known Problems Paternal Grandmother      No Known Problems Paternal Grandfather      Heart disease Sister      No Known Problems Maternal Aunt      No Known Problems Maternal Uncle      No Known Problems Paternal Aunt      No Known Problems Paternal Uncle      Amblyopia Neg Hx      Blindness Neg Hx      Cancer Neg Hx      Cataracts Neg Hx      Glaucoma Neg Hx      Hypertension Neg Hx      Macular degeneration Neg Hx      Retinal detachment Neg Hx      Strabismus Neg Hx      Stroke Neg Hx      Thyroid disease Neg Hx      Anemia Neg Hx      Arrhythmia Neg Hx      Asthma Neg Hx      Clotting disorder Neg Hx      Fainting Neg Hx      Heart attack Neg Hx      Hyperlipidemia Neg Hx      Atrial Septal Defect Neg Hx      Melanoma Neg Hx         Social History     Socioeconomic History    Marital status:      Spouse name: Ameena    Number of children: 3   Occupational History    Occupation: retired   Tobacco Use    Smoking status: Former     Current packs/day: 0.00     Average packs/day: 1 pack/day for 20.0 years (20.0 ttl pk-yrs)     Types: Cigarettes     Start date: 1960     Quit date: 1980     Years since quittin.9     Passive exposure: Never    Smokeless tobacco: Never   Vaping Use    Vaping status: Never Used   Substance and Sexual Activity    Alcohol use: No    Drug use: No    Sexual activity: Not Currently      Partners: Female     Social Drivers of Health     Financial Resource Strain: Low Risk  (7/28/2025)    Overall Financial Resource Strain (CARDIA)     Difficulty of Paying Living Expenses: Not hard at all   Food Insecurity: No Food Insecurity (7/28/2025)    Hunger Vital Sign     Worried About Running Out of Food in the Last Year: Never true     Ran Out of Food in the Last Year: Never true   Transportation Needs: No Transportation Needs (7/28/2025)    PRAPARE - Transportation     Lack of Transportation (Medical): No     Lack of Transportation (Non-Medical): No   Physical Activity: Inactive (7/28/2025)    Exercise Vital Sign     Days of Exercise per Week: 0 days     Minutes of Exercise per Session: 0 min   Stress: No Stress Concern Present (7/28/2025)    [employer] [place] [employer] - Occupational Stress Questionnaire     Feeling of Stress : Not at all   Housing Stability: Low Risk  (7/28/2025)    Housing Stability Vital Sign     Unable to Pay for Housing in the Last Year: No     Homeless in the Last Year: No       Time Tracking:     PT Received On: 07/29/25  PT Start Time: 1439     PT Stop Time: 1455  PT Total Time (min): 16 min     Billable Minutes: Evaluation 16 minutes    7/29/2025

## 2025-07-29 NOTE — ASSESSMENT & PLAN NOTE
85 yo male with heart valve on Warfarin and recurrent epistaxis presents to ER due to left sided epistaxis. Currently packed with bilateral merocels. Hb 7.1 in ED, receiving 1u pRBC and admitted to medicine.     07/29/2025: Hemostatic today. OR today for nasal endoscopy and control of hemorrhage. Please attempt to transfuse 2u pRBC prior to OR    - Transfuse 2u pRBC to optimize H/H prior to OR  - Keep NPO until OR  - OK for diet per primary after OR  - Maintain bilateral merocel  - Please place patient on anti-staph antibiotics while packing is in place  - Afrin to bilateral nares q8h for the next 48h  - May place mustache dressing under nose for trickling, ok if dressing becomes saturated with some red/brown drainage  - Nasal saline q4h while packing in place, even on side with nasal packing. Can continue nasal saline sprays to keep mucosa moist to prevent future bleeding.  - Keep head of bed elevated  - Apply Afrin to affected nasal cavity if epistaxis recurs  - Please call ENT with questions  - Remainder of care per primary team

## 2025-07-29 NOTE — NURSING
[facility] Nasal packing removed by md. No acute issues present.

## 2025-07-29 NOTE — PLAN OF CARE
PT evaluation completed- see note for details. PT POC and goals established.    Problem: Physical Therapy  Goal: Physical Therapy Goal  Description: Goals to be met by: 25     Patient will increase functional independence with mobility by performin. Gait  x 300 feet with [place] using No Assistive Device.     Outcome: Progressing

## 2025-07-29 NOTE — PLAN OF CARE
Problem: Adult Inpatient Plan of Care  Goal: Plan of Care Review  Outcome: Not Progressing  Goal: Optimal Comfort and Wellbeing  Outcome: Not Progressing      Aida Paez's office called in regards to her referral to GI for the positive Hep C screening and said that since the RNA was not detected it means she has likely healed herself at this point. They are sending referral back. Just wanted to make you aware in case we need to cancel out on our end.

## 2025-07-29 NOTE — ASSESSMENT & PLAN NOTE
Patient has long standing persistent (>12 months) atrial fibrillation. Patient is currently in sinus rhythm. [id number] Score: 4. The patients heart rate in the last 24 hours is as follows:  Pulse  Min: 65  Max: 67      Antiarrhythmics     Anticoagulants     Plan  - Replete lytes with a goal of K>4, Mg >2  - Holding warfarin in setting of epistaxis; currently therapeutic  - Patient's afib is currently controlled

## 2025-07-30 ENCOUNTER — TELEPHONE (OUTPATIENT)
Dept: OTOLARYNGOLOGY | Facility: CLINIC | Age: 85
End: 2025-07-30
Payer: MEDICARE

## 2025-07-30 VITALS
TEMPERATURE: 97 F | HEART RATE: 63 BPM | OXYGEN SATURATION: 93 % | RESPIRATION RATE: 16 BRPM | HEIGHT: 65 IN | DIASTOLIC BLOOD PRESSURE: 51 MMHG | SYSTOLIC BLOOD PRESSURE: 141 MMHG | BODY MASS INDEX: 23.88 KG/M2 | WEIGHT: 143.31 LBS

## 2025-07-30 LAB
ABSOLUTE EOSINOPHIL (OHS): 0.01 K/UL
ABSOLUTE MONOCYTE (OHS): 0.28 K/UL (ref 0.3–1)
ABSOLUTE NEUTROPHIL COUNT (OHS): 7.23 K/UL (ref 1.8–7.7)
ANION GAP (OHS): 10 MMOL/L (ref 8–16)
BASOPHILS # BLD AUTO: 0.02 K/UL
BASOPHILS NFR BLD AUTO: 0.2 %
BUN SERPL-MCNC: 49 MG/DL (ref 8–23)
CALCIUM SERPL-MCNC: 9.6 MG/DL (ref 8.7–10.5)
CHLORIDE SERPL-SCNC: 112 MMOL/L (ref 95–110)
CO2 SERPL-SCNC: 17 MMOL/L (ref 23–29)
CREAT SERPL-MCNC: 2.4 MG/DL (ref 0.5–1.4)
ERYTHROCYTE [DISTWIDTH] IN BLOOD BY AUTOMATED COUNT: 15.4 % (ref 11.5–14.5)
GFR SERPLBLD CREATININE-BSD FMLA CKD-EPI: 26 ML/MIN/1.73/M2
GLUCOSE SERPL-MCNC: 91 MG/DL (ref 70–110)
HCT VFR BLD AUTO: 31.1 % (ref 40–54)
HGB BLD-MCNC: 10.1 GM/DL (ref 14–18)
IMM GRANULOCYTES # BLD AUTO: 0.04 K/UL (ref 0–0.04)
IMM GRANULOCYTES NFR BLD AUTO: 0.5 % (ref 0–0.5)
INR PPP: 2.8 (ref 0.8–1.2)
LYMPHOCYTES # BLD AUTO: 0.47 K/UL (ref 1–4.8)
MCH RBC QN AUTO: 30.7 PG (ref 27–31)
MCHC RBC AUTO-ENTMCNC: 32.5 G/DL (ref 32–36)
MCV RBC AUTO: 95 FL (ref 82–98)
NUCLEATED RBC (/100WBC) (OHS): 0 /100 WBC
PLATELET # BLD AUTO: 160 K/UL (ref 150–450)
PMV BLD AUTO: 10 FL (ref 9.2–12.9)
POTASSIUM SERPL-SCNC: 4.9 MMOL/L (ref 3.5–5.1)
PROTHROMBIN TIME: 28.2 SECONDS (ref 9–12.5)
RBC # BLD AUTO: 3.29 M/UL (ref 4.6–6.2)
RELATIVE EOSINOPHIL (OHS): 0.1 %
RELATIVE LYMPHOCYTE (OHS): 5.8 % (ref 18–48)
RELATIVE MONOCYTE (OHS): 3.5 % (ref 4–15)
RELATIVE NEUTROPHIL (OHS): 89.9 % (ref 38–73)
SODIUM SERPL-SCNC: 139 MMOL/L (ref 136–145)
WBC # BLD AUTO: 8.05 K/UL (ref 3.9–12.7)

## 2025-07-30 PROCEDURE — 85610 PROTHROMBIN TIME: CPT | Mod: HCNC

## 2025-07-30 PROCEDURE — G0378 HOSPITAL OBSERVATION PER HR: HCPCS | Mod: HCNC

## 2025-07-30 PROCEDURE — 80048 BASIC METABOLIC PNL TOTAL CA: CPT | Mod: HCNC

## 2025-07-30 PROCEDURE — 25000003 PHARM REV CODE 250: Mod: HCNC | Performed by: INTERNAL MEDICINE

## 2025-07-30 PROCEDURE — 25000003 PHARM REV CODE 250: Mod: HCNC

## 2025-07-30 PROCEDURE — 36415 COLL VENOUS BLD VENIPUNCTURE: CPT | Mod: HCNC

## 2025-07-30 PROCEDURE — 85025 COMPLETE CBC W/AUTO DIFF WBC: CPT | Mod: HCNC

## 2025-07-30 RX ORDER — WARFARIN 2.5 MG/1
2.5 TABLET ORAL DAILY
Qty: 30 TABLET | Refills: 0 | Status: SHIPPED | OUTPATIENT
Start: 2025-07-30

## 2025-07-30 RX ADMIN — ALLOPURINOL 100 MG: 100 TABLET ORAL at 09:07

## 2025-07-30 RX ADMIN — NASAL 1 SPRAY: 6.5 SPRAY NASAL at 02:07

## 2025-07-30 RX ADMIN — NASAL 1 SPRAY: 6.5 SPRAY NASAL at 05:07

## 2025-07-30 RX ADMIN — ATORVASTATIN CALCIUM 40 MG: 40 TABLET, FILM COATED ORAL at 09:07

## 2025-07-30 RX ADMIN — Medication 324 MG: at 09:07

## 2025-07-30 RX ADMIN — NASAL 1 SPRAY: 6.5 SPRAY NASAL at 09:07

## 2025-07-30 NOTE — NURSING
.AVS virtually reviewed with patient in its entirety with emphasis on diagnosis, diet and lifestyle modifications, medications, follow-up appointments and reasons to return to the ED. Patient also encouraged to utilize their patient portal. Ease and convenience of use reiterated. Education complete and patient voiced understanding with teach back method. All questions answered. Discharge teaching complete. Care Plan and Education templates resolved.

## 2025-07-30 NOTE — DISCHARGE SUMMARY
[facility] - Internal Medicine [facility]  Discharge Summary      Patient Name: Dariel Urbina  MRN: [id number]  EMELIA: [id number]  Patient Class: OP- Observation  Admission Date: 7/27/2025  Hospital Length of Stay: 0 days  Discharge Date and Time: 07/30/2025 9:08 AM  Attending Physician: Octavio Santos MD   Discharging Provider: Octavio Santos MD  Primary Care Provider: Devon Langston Jr., MD  Hospital Medicine Team: [facility] Octavio Santos MD  Primary Care Team: [facility]    HPI:   Mr. 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 He felt weak, but no HA, vision changes, SOB, no new chest pain, no other overt bleeding nor stool changes, no LE edema.     In the ED he is afebrile and hemodynamically stable on RA with anemia to 7.1, Cr 2.5 (at baseline), INR 3.1. 1 u pRBC ordered, TXA ordered, and ENT consulted for nasal packing and recommendations for epistaxis management. Admitted to Hospital Medicine for evaluation of epistaxis in patient with mechanical AVR.     Procedure(s) (LRB):  ENDOSCOPY, NOSE (Bilateral)  CAUTERIZATION, NOSE (Bilateral)      Hospital Course:   Patient admitted for recurrent epistaxis in setting of warfarin use. INR 3.1. Warfarin held and ENT consulted. Pharmacy consulted for warfarin administration. Nose packed by ENT; Hgb stable. Patient to OR with ENT on 7/29 for nasal cauterization which he tolerated well. Hgb uptrended and patient without recurrent epistaxis. INR remained above goal (goal 2.0-2.5 per cardiologist; INR was 2.8 at time of discharge). Held warfarin during his stay and patient to follow up with coumadin clinic (inpatient pharmacy to coordinate with coumadin clinic to  follow up INR check) for continued warfarin dosing. Patient to start warfarin 2.5mg daily starting 7/30 per inpatient pharmacy until coumadin follow up. Patient seen and evaluated on day of discharge. Pt deemed appropriate for discharge. Plan discussed with pt, who was agreeable and amenable; medications were discussed and reviewed, outpatient follow-up scheduled, ER precautions were given, all questions were answered to the pt's satisfaction, and patient was subsequently discharged.     Goals of Care Treatment Preferences:  Code Status: Full Code         Consults:   Consults (From admission, onward)          Status Ordering Provider     [facility] PharmD Consult  Once        Provider:  (Not yet assigned)    Acknowledged KATHI PARRISH     Inpatient consult to ENT  Once        Provider:  (Not yet assigned)    Completed TIM ELLIS            Final Active Diagnoses:    Diagnosis Date Noted POA    PRINCIPAL PROBLEM:  Epistaxis [R04.0] 03/21/2018 Yes    Recurrent epistaxis [R04.0] 12/21/2022 Yes    Acute blood loss anemia [D62] 12/21/2022 Yes    Anemia of chronic renal failure [N18.9, D63.1] 07/08/2024 Yes    Chronic kidney disease, stage 4 (severe) [N18.4] 06/12/2024 Yes    Paroxysmal atrial fibrillation [I48.0] 02/06/2023 Yes    Coronary artery disease involving native coronary artery of native heart without angina pectoris [I25.10] 12/29/2022 Yes    Hypertension associated with diabetes [E11.59, I15.2] 09/25/2019 Yes    H/O mechanical aortic valve replacement [Z95.2] 09/17/2014 Not Applicable    Type 2 diabetes mellitus with stage 4 chronic kidney disease, without long-term current use of insulin [E11.22, N18.4] 10/02/2013 Yes     Chronic    Hyperlipidemia associated with type 2 diabetes mellitus [E11.69, E78.5] 09/26/2012 Yes    Chronic anticoagulation [Z79.01] 09/26/2012 Not Applicable      Problems Resolved During this Admission:       Discharged Condition: good    Disposition: Home or Self  Care    Follow Up:    Patient Instructions:      ACCEPT - Ambulatory referral/consult to [facility] Failure [facility]itional [facility]   Standing Status: Future   Referral Priority: Routine Referral Type: Consultation   Referral Reason: Specialty Services Required   Requested Specialty: Cardiology   Number of Visits Requested: 1       Significant Diagnostic Studies: N/A    Pending Diagnostic Studies:       None           Medications:  Reconciled Home Medications:      Medication List        CHANGE how you take these medications      warfarin 2.5 MG tablet  Commonly known as: COUMADIN  Take 1 tablet (2.5 mg total) by mouth Daily. TAKE 2.5MG MONDAY, WEDNESDAY, FRIDAY AND THEN TAKE 5MG ALL OTHER DAYS OR AS INSTRUCTED BY COUMADIN CLINIC  What changed:   medication strength  how much to take  how to take this  when to take this            CONTINUE taking these medications      acetaminophen 500 MG tablet  Commonly known as: TYLENOL  Take 500 mg by mouth daily as needed for Pain.     albuterol 90 mcg/actuation inhaler  Commonly known as: VENTOLIN HFA  Inhale 2 puffs into the lungs every 6 (six) hours as needed for Wheezing. Rescue     allopurinoL 100 MG tablet  Commonly known as: ZYLOPRIM  Take 1 tablet (100 mg total) by mouth once daily.     bumetanide 1 MG tablet  Commonly known as: BUMEX  TAKE 2 TABLETS EVERY OTHER DAY     DEEP SEA NASAL 0.65 % nasal spray  Generic drug: sodium chloride  Use 2 sprays by Nasal route every 4 hours. Apply into nasal cavities daily with nightly application of vaseline/aquaphor to anterior nares. Keep head of bed elevated.     ferrous gluconate 324 MG tablet  Commonly known as: FERGON  Take 1 tablet (324 mg total) by mouth daily with breakfast.     fish oil-omega-3 fatty acids 300-1,000 mg capsule  Take 1 capsule by mouth once daily.     isosorbide mononitrate 60 MG 24 hr tablet  Commonly known as: IMDUR  Take 1 tablet (60 mg total) by mouth every evening.     ketoconazole 2 % cream  Commonly  known as: NIZORAL  Apply topically 2 (two) times daily. For dry skin around nose and ears     NASAL DECONGESTANT (OXYMETAZL) 0.05 % nasal spray  Generic drug: oxymetazoline  2 sprays by Nasal route as needed (nose bleeds).     QUEtiapine 50 MG tablet  Commonly known as: SEROQUEL  Take 1 tablet (50 mg total) by mouth every evening.     rosuvastatin 40 MG Tab  Commonly known as: CRESTOR  Take 1 tablet (40 mg total) by mouth every evening.            ASK your doctor about these medications      sodium bicarbonate 650 MG tablet  Take 1 tablet (650 mg total) by mouth once daily.              Indwelling Lines/Drains at time of discharge:   Lines/Drains/Airways       None                       Time spent on the discharge of patient: 35 minutes         Octavio Santos MD  Department of Hospital Medicine  [facility] - Internal Medicine Telemetry

## 2025-07-30 NOTE — PROGRESS NOTES
[facility] - Internal Medicine Telemetry  Otorhinolaryngology-Head & Neck Surgery  Progress Note    Subjective:     Post-Op Info:  Procedure(s) (LRB):  ENDOSCOPY, NOSE (Bilateral)  CAUTERIZATION, NOSE (Bilateral)   1 Day Post-Op  Hospital Day: 4     Interval History: NAEON. AFVSS. No bleeding events reported.     Medications:  Continuous Infusions:   0.9% NaCl   Intravenous Continuous 10 mL/hr at 07/29/25 0928 New Bag at 07/29/25 0928     Scheduled Meds:   allopurinoL  100 mg Oral Daily    atorvastatin  40 mg Oral Daily    ferrous gluconate  324 mg Oral Daily with breakfast    isosorbide mononitrate  60 mg Oral QHS    QUEtiapine  50 mg Oral QHS    sodium chloride  1 spray Each Nostril Q4H     PRN Meds:  Current Facility-Administered Medications:     0.9%  NaCl infusion (for blood administration), , Intravenous, Q24H PRN    0.9%  NaCl infusion (for blood administration), , Intravenous, Q24H PRN    acetaminophen, 1,000 mg, Oral, Q8H PRN    acetaminophen, 650 mg, Oral, Q4H PRN    dextrose 50%, 12.5 g, Intravenous, PRN    dextrose 50%, 25 g, Intravenous, PRN    glucagon (human recombinant), 1 mg, Intramuscular, PRN    glucose, 16 g, Oral, PRN    glucose, 24 g, Oral, PRN    HYDROmorphone, 1 mg, Intravenous, Q4H PRN    naloxone, 0.02 mg, Intravenous, PRN    ondansetron, 4 mg, Intravenous, Q8H PRN    oxymetazoline, 2 spray, Each Nostril, BID PRN    sodium chloride, 1 spray, Each Nostril, PRN    sodium chloride 0.9%, 10 mL, Intravenous, Q12H PRN     Review of patient's allergies indicates:   Allergen Reactions    Lisinopril     Losartan      Intolerance- elevates potassium level     Objective:     Vital Signs (24h Range):  Temp:  [97.5 °F (36.4 °C)-98.2 °F (36.8 °C)] 97.8 °F (36.6 °C)  Pulse:  [32-88] 73  Resp:  [12-18] 18  SpO2:  [95 %-100 %] 96 %  BP: (118-174)/(40-97) 146/55       Lines/Drains/Airways       Peripheral Intravenous Line  Duration             Peripheral IV Single Lumen 07/27/25 1510 20 G Anterior;Distal;Left  Upper Arm 2 days    Peripheral IV Single Lumen 07/27/25 1618 20 G Posterior;Right Forearm 2 days                     Physical Exam   NAD  No blood in nose, mouth or OP    Significant Labs:  CBC:   Recent Labs   Lab 07/30/25  0440   WBC 8.05   RBC 3.29*   HGB 10.1*   HCT 31.1*      MCV 95   MCH 30.7   MCHC 32.5     CMP:   Recent Labs   Lab 07/27/25  1512 07/28/25  0547 07/30/25  0440      < > 91   CALCIUM 9.7   < > 9.6   ALBUMIN 3.2*  --   --    PROT 7.4  --   --       < > 139   K 4.4   < > 4.9   CO2 23   < > 17*      < > 112*   BUN 47*   < > 49*   CREATININE 2.5*   < > 2.4*   ALKPHOS 92  --   --    ALT 41  --   --    AST 51*  --   --    BILITOT 0.5  --   --     < > = values in this interval not displayed.       Significant Diagnostics:  None  Assessment/Plan:     Recurrent epistaxis  83 yo male with heart valve on Warfarin and recurrent epistaxis presents to ER due to left sided epistaxis. Currently packed with bilateral merocels. Hb 7.1 in ED, receiving 1u pRBC and admitted to medicine.     07/30/2025: Hemostatic since OR. OK for discharge from ENT perspective    - Routine epistaxis management as needed  - Anticoagulation per primary  - Please call ENT with questions  - Remainder of care per primary team  - Outpatient followup with Dr. Russell in 2 weeks        Kyle Berg MD  Otorhinolaryngology-Head & Neck Surgery  [facility] - Internal Medicine Telemetry

## 2025-07-30 NOTE — DISCHARGE INSTRUCTIONS
.Our goal at [facility] is to always give you outstanding care and exceptional service. You may receive a survey from [employer] by mail, text or e-mail in the next 24-48 hours asking about the care you received with us. The survey should only take 5-10 minutes to complete and is very important to us.     Your feedback provides us with a way to recognize our staff who work tirelessly to provide the best care! Also, your responses help us learn how to improve when your experience was below our aspiration of excellence. We are always looking for ways to improve your stay. We WILL use your feedback to continue making improvements to help us provide the highest quality care. We keep your personal information and feedback confidential. We appreciate your time completing this survey and can't wait to hear from you!!!    We look forward to your continued care with us! Thanks so much for choosing [facility] for your healthcare needs!

## 2025-07-30 NOTE — PLAN OF CARE
Problem: Hospitalized Older Adult  Goal: Optimal Cognitive Function  7/30/2025 1056 by Alberto Burnett LPN  Outcome: Met  7/30/2025 1013 by Alberto Burnett LPN  Outcome: Progressing  Goal: Effective Bowel Elimination  7/30/2025 1056 by Alberto Burnett LPN  Outcome: Met  7/30/2025 1013 by Alberto Burnett LPN  Outcome: Progressing  Goal: Optimal Coping  7/30/2025 1056 by Alberto Burnett LPN  Outcome: Met  7/30/2025 1013 by Alberto Burnett LPN  Outcome: Progressing  Goal: Fluid and Electrolyte Balance  7/30/2025 1056 by Alberto Burnett LPN  Outcome: Met  7/30/2025 1013 by Alberto Burnett LPN  Outcome: Progressing  Goal: Optimal Functional Ability  7/30/2025 1056 by Alberto Burnett LPN  Outcome: Met  7/30/2025 1013 by Alberto Burnett LPN  Outcome: Progressing  Goal: Improved Oral Intake  7/30/2025 1056 by Alberto Burnett LPN  Outcome: Met  7/30/2025 1013 by Alberto Burnett LPN  Outcome: Progressing  Goal: Adequate Sleep/Rest  7/30/2025 1056 by Alberto Burnett LPN  Outcome: Met  7/30/2025 1013 by Alberto Burnett LPN  Outcome: Progressing  Goal: Effective Urinary Elimination  7/30/2025 1056 by Alberto Burnett LPN  Outcome: Met  7/30/2025 1013 by Alberto Burnett LPN  Outcome: Progressing     Problem: Adult Inpatient Plan of Care  Goal: Plan of Care Review  7/30/2025 1056 by Alberto Burnett LPN  Outcome: Met  7/30/2025 1013 by Alberto Burnett LPN  Outcome: Progressing  Goal: Patient-Specific Goal (Individualized)  7/30/2025 1056 by Alberto Burnett LPN  Outcome: Met  7/30/2025 1013 by Alberto Burnett LPN  Outcome: Progressing  Goal: Absence of Hospital-Acquired Illness or Injury  7/30/2025 1056 by Alberto Burnett LPN  Outcome: Met  7/30/2025 1013 by Alberto Burnett LPN  Outcome: Progressing  Goal: Optimal Comfort and Wellbeing  7/30/2025 1056 by Alberto Burnett LPN  Outcome: Met  7/30/2025 1013 by Alberto Burnett LPN  Outcome: Progressing  Goal: Readiness for Transition of Care  7/30/2025 1056 by Alberto Burnett LPN  Outcome: Met  7/30/2025 1013 by Alberto Burnett,  LPN  Outcome: Progressing     Problem: Diabetes Comorbidity  Goal: Blood Glucose Level Within Targeted Range  7/30/2025 1056 by Alberto Burnett LPN  Outcome: Met  7/30/2025 1013 by Alberto Burnett LPN  Outcome: Progressing     Problem: Pneumonia  Goal: Fluid Balance  7/30/2025 1056 by Alberto Burnett LPN  Outcome: Met  7/30/2025 1013 by Alberto Burnett LPN  Outcome: Progressing  Goal: Resolution of Infection Signs and Symptoms  7/30/2025 1056 by Alberto Burnett LPN  Outcome: Met  7/30/2025 1013 by Alberto Burnett LPN  Outcome: Progressing  Goal: Effective Oxygenation and Ventilation  7/30/2025 1056 by Alberto Burnett LPN  Outcome: Met  7/30/2025 1013 by Alberto Burnett LPN  Outcome: Progressing     Problem: Wound  Goal: Optimal Coping  7/30/2025 1056 by Alberto Burnett LPN  Outcome: Met  7/30/2025 1013 by Alberto Burnett LPN  Outcome: Progressing  Goal: Optimal Functional Ability  7/30/2025 1056 by Alberto Burnett LPN  Outcome: Met  7/30/2025 1013 by Alberto Burnett LPN  Outcome: Progressing  Goal: Absence of Infection Signs and Symptoms  7/30/2025 1056 by Alberto Burnett LPN  Outcome: Met  7/30/2025 1013 by Alberto Burnett LPN  Outcome: Progressing  Goal: Improved Oral Intake  7/30/2025 1056 by Alberto Burnett LPN  Outcome: Met  7/30/2025 1013 by Alberto Burnett LPN  Outcome: Progressing  Goal: Optimal Pain Control and Function  7/30/2025 1056 by Alberto Burnett LPN  Outcome: Met  7/30/2025 1013 by Alberto Burnett LPN  Outcome: Progressing  Goal: Skin Health and Integrity  7/30/2025 1056 by Alberto Burnett LPN  Outcome: Met  7/30/2025 1013 by Alberto Burnett LPN  Outcome: Progressing  Goal: Optimal Wound Healing  7/30/2025 1056 by Alberto Burnett LPN  Outcome: Met  7/30/2025 1013 by Alberto Burnett LPN  Outcome: Progressing     Problem: Fall Injury Risk  Goal: Absence of Fall and Fall-Related Injury  7/30/2025 1056 by Alberto Burnett LPN  Outcome: Met  7/30/2025 1013 by Alberto Burnett LPN  Outcome: Progressing     Problem: Anemia  Goal: Anemia Symptom  Improvement  7/30/2025 1056 by Alberto Burnett LPN  Outcome: Met  7/30/2025 1013 by Alberto Burnett LPN  Outcome: Progressing

## 2025-07-30 NOTE — SUBJECTIVE & OBJECTIVE
Interval History: NAEON. AFVSS. No bleeding events reported.     Medications:  Continuous Infusions:   0.9% NaCl   Intravenous Continuous 10 mL/hr at 07/29/25 0928 New Bag at 07/29/25 0928     Scheduled Meds:   allopurinoL  100 mg Oral Daily    atorvastatin  40 mg Oral Daily    ferrous gluconate  324 mg Oral Daily with breakfast    isosorbide mononitrate  60 mg Oral QHS    QUEtiapine  50 mg Oral QHS    sodium chloride  1 spray Each Nostril Q4H     PRN Meds:  Current Facility-Administered Medications:     0.9%  NaCl infusion (for blood administration), , Intravenous, Q24H PRN    0.9%  NaCl infusion (for blood administration), , Intravenous, Q24H PRN    acetaminophen, 1,000 mg, Oral, Q8H PRN    acetaminophen, 650 mg, Oral, Q4H PRN    dextrose 50%, 12.5 g, Intravenous, PRN    dextrose 50%, 25 g, Intravenous, PRN    glucagon (human recombinant), 1 mg, Intramuscular, PRN    glucose, 16 g, Oral, PRN    glucose, 24 g, Oral, PRN    HYDROmorphone, 1 mg, Intravenous, Q4H PRN    naloxone, 0.02 mg, Intravenous, PRN    ondansetron, 4 mg, Intravenous, Q8H PRN    oxymetazoline, 2 spray, Each Nostril, BID PRN    sodium chloride, 1 spray, Each Nostril, PRN    sodium chloride 0.9%, 10 mL, Intravenous, Q12H PRN     Review of patient's allergies indicates:   Allergen Reactions    Lisinopril     Losartan      Intolerance- elevates potassium level     Objective:     Vital Signs (24h Range):  Temp:  [97.5 °F (36.4 °C)-98.2 °F (36.8 °C)] 97.8 °F (36.6 °C)  Pulse:  [32-88] 73  Resp:  [12-18] 18  SpO2:  [95 %-100 %] 96 %  BP: (118-174)/(40-97) 146/55       Lines/Drains/Airways       Peripheral Intravenous Line  Duration             Peripheral IV Single Lumen 07/27/25 1510 20 G Anterior;Distal;Left Upper Arm 2 days    Peripheral IV Single Lumen 07/27/25 1618 20 G Posterior;Right Forearm 2 days                     Physical Exam   NAD  No blood in nose, mouth or OP    Significant Labs:  CBC:   Recent Labs   Lab 07/30/25  0440   WBC 8.05   RBC  3.29*   HGB 10.1*   HCT 31.1*      MCV 95   MCH 30.7   MCHC 32.5     CMP:   Recent Labs   Lab 07/27/25  1512 07/28/25  0547 07/30/25  0440      < > 91   CALCIUM 9.7   < > 9.6   ALBUMIN 3.2*  --   --    PROT 7.4  --   --       < > 139   K 4.4   < > 4.9   CO2 23   < > 17*      < > 112*   BUN 47*   < > 49*   CREATININE 2.5*   < > 2.4*   ALKPHOS 92  --   --    ALT 41  --   --    AST 51*  --   --    BILITOT 0.5  --   --     < > = values in this interval not displayed.       Significant Diagnostics:  None

## 2025-07-30 NOTE — PLAN OF CARE
Problem: Hospitalized Older Adult  Goal: Optimal Cognitive Function  Outcome: Progressing  Goal: Effective Bowel Elimination  Outcome: Progressing  Goal: Optimal Coping  Outcome: Progressing  Goal: Fluid and Electrolyte Balance  Outcome: Progressing  Goal: Optimal Functional Ability  Outcome: Progressing  Goal: Improved Oral Intake  Outcome: Progressing  Goal: Adequate Sleep/Rest  Outcome: Progressing  Goal: Effective Urinary Elimination  Outcome: Progressing     Problem: Adult Inpatient Plan of Care  Goal: Plan of Care Review  Outcome: Progressing  Goal: Patient-Specific Goal (Individualized)  Outcome: Progressing  Goal: Absence of Hospital-Acquired Illness or Injury  Outcome: Progressing  Goal: Optimal Comfort and Wellbeing  Outcome: Progressing  Goal: Readiness for Transition of Care  Outcome: Progressing     Problem: Diabetes Comorbidity  Goal: Blood Glucose Level Within Targeted Range  Outcome: Progressing     Problem: Pneumonia  Goal: Fluid Balance  Outcome: Progressing  Goal: Resolution of Infection Signs and Symptoms  Outcome: Progressing  Goal: Effective Oxygenation and Ventilation  Outcome: Progressing     Problem: Wound  Goal: Optimal Coping  Outcome: Progressing  Goal: Optimal Functional Ability  Outcome: Progressing  Goal: Absence of Infection Signs and Symptoms  Outcome: Progressing  Goal: Improved Oral Intake  Outcome: Progressing  Goal: Optimal Pain Control and Function  Outcome: Progressing  Goal: Skin Health and Integrity  Outcome: Progressing  Goal: Optimal Wound Healing  Outcome: Progressing     Problem: Fall Injury Risk  Goal: Absence of Fall and Fall-Related Injury  Outcome: Progressing     Problem: Anemia  Goal: Anemia Symptom Improvement  Outcome: Progressing

## 2025-07-30 NOTE — ASSESSMENT & PLAN NOTE
83 yo male with heart valve on Warfarin and recurrent epistaxis presents to ER due to left sided epistaxis. Currently packed with bilateral merocels. Hb 7.1 in ED, receiving 1u pRBC and admitted to medicine.     07/30/2025: Hemostatic since OR. OK for discharge from ENT perspective    - Routine epistaxis management as needed  - Anticoagulation per primary  - Please call ENT with questions  - Remainder of care per primary team  - Outpatient followup with Dr. Russell in 2 weeks

## 2025-07-30 NOTE — PROGRESS NOTES
Pt diastolic has been running lower than normal (please see data below). Pt denies any complaints of headache, dizziness, blurred vision. Doctor notified of abnormal pressure.  Pt is aaox4, up in chair, bedside table within reach & call light in reach. No further nursing assessments needed.     07/30/25 0041 07/30/25 0100 07/30/25 0358   Vital Signs   Temp 97.5 °F (36.4 °C) 97.6 °F (36.4 °C) 97.8 °F (36.6 °C)   Temp Source  --  Oral  --    Pulse 60 (!) 58 63   Heart Rate Source  --  SpO2  --    Resp 18 16 18   SpO2 95 % 95 % 96 %   BP (!) 127/40 (!) 118/46 (!) 124/59   MAP (mmHg) 69 70 81   BP Location Left arm Right arm Right arm   Patient Position Sitting Sitting Sitting      07/30/25 0530   Vital Signs   Temp  --    Temp Source  --    Pulse 73   Heart Rate Source  --    Resp  --    SpO2  --    BP (!) 146/55   MAP (mmHg) 86   BP Location  --    Patient Position  --

## 2025-07-30 NOTE — TELEPHONE ENCOUNTER
----- Message from Kyle Berg sent at 7/30/2025  7:07 AM CDT -----  Hello!! Could you please set up followup for this patient in 2 weeks with Dr. Russell for post-op check

## 2025-07-31 NOTE — PLAN OF CARE
[facility] - Internal Medicine Telemetry  Discharge Final Note    Primary Care Provider: Devon Langston Jr., MD    Expected Discharge Date: 7/30/2025    Patient discharged to home.     Patient's bedside nurse and medical team notified of the above.    Discharge Plan A and Plan B have been determined by review of patient's clinical status, future medical and therapeutic needs, and coverage/benefits for post-acute care in coordination with multidisciplinary team members.        Final Discharge Note (most recent)       Final Note - 07/31/25 0958          Final Note    Assessment Type Final Discharge Note (P)      Anticipated Discharge Disposition Home or Self Care (P)      Hospital Resources/Appts/Education Provided Provided patient/caregiver with written discharge plan information;Appointments scheduled and added to AVS (P)         Post-Acute Status    Post-Acute Authorization Other (P)      Other Status No Post-Acute Service Needs (P)      Discharge Delays None known at this time (P)                      Important Message from Medicare                 Future Appointments   Date Time Provider Department Center   8/4/2025  7:30 AM LAB, APPOINTMENT [facility] INTMED [facility] LAB [facility]   8/12/2025 10:00 AM Devon Langston Jr., MD [facility] IM [facility]   8/14/2025  1:30 PM Jono Russell MD [facility] HNSO [facility]   9/9/2025 10:30 AM Priscilla Loza NP [facility] NEPHRO [facility]   9/18/2025  1:20 PM Sameer Espitia MD [facility] CARDIO [place]        scheduled post-discharge follow-up appointment and information added to AVS.     Discharge Plan A and Plan B have been determined by review of patient's clinical status, future medical and therapeutic needs, and coverage/benefits for post-acute care in coordination with multidisciplinary team members.    Bobo Isaacs, RADHA-CM  Case Management  [facility]  [phone]

## 2025-08-01 ENCOUNTER — ANTI-COAG VISIT (OUTPATIENT)
Dept: CARDIOLOGY | Facility: CLINIC | Age: 85
End: 2025-08-01
Payer: MEDICARE

## 2025-08-01 ENCOUNTER — LAB VISIT (OUTPATIENT)
Dept: LAB | Facility: HOSPITAL | Age: 85
End: 2025-08-01
Payer: MEDICARE

## 2025-08-01 DIAGNOSIS — Z79.01 CHRONIC ANTICOAGULATION: Primary | ICD-10-CM

## 2025-08-01 DIAGNOSIS — Z95.2 H/O MECHANICAL AORTIC VALVE REPLACEMENT: ICD-10-CM

## 2025-08-01 DIAGNOSIS — Z79.01 CHRONIC ANTICOAGULATION: ICD-10-CM

## 2025-08-01 LAB
INR PPP: 2.4 (ref 0.8–1.2)
PROTHROMBIN TIME: 25.1 SECONDS (ref 9–12.5)

## 2025-08-01 PROCEDURE — 36415 COLL VENOUS BLD VENIPUNCTURE: CPT | Mod: HCNC

## 2025-08-01 PROCEDURE — 85610 PROTHROMBIN TIME: CPT | Mod: HCNC

## 2025-08-01 NOTE — PROGRESS NOTES
[facility]    2025 9:56 AM    Assessment/Plan:    Patient presents today with therapeutic INR.    Assessment of patient findings and chart review: Recommend to resume dosing reduction as pt resumes routine (diet, normal activities, etc).  Will continue to monitor closely.     Recommendation for patient's warfarin regimen: Dose per calendar.     Recommend repeat INR Monday.   _________________________________________________________________    Dariel Urbina (84 y.o.) is followed by the [facility].    Anticoagulation Summary  As of 2025      INR goal:  2.0-2.5   TTR:  65.3% (12.8 y)   INR used for dosin.4 (2025)   Warfarin maintenance plan:  5 mg (5 mg x 1) every Sun, Tue, Thu; 2.5 mg (5 mg x 0.5) all other days   Weekly warfarin total:  25 mg   Plan last modified:  Laurie Patino, PharmD (2025)   Next INR check:  2025   Target end date:  --    Indications    Chronic anticoagulation [Z79.01]  H/O mechanical aortic valve replacement [Z95.2]                 Anticoagulation Episode Summary       INR check location:  Clinic Lab    Preferred lab:  --    Send INR reminders to:  [facility] COUMADIN MONITORING POOL    Comments:  [facility] IM - Internal Med Clinic only Mon - Thu // carpel tunnel release  - target low end of range          Anticoagulation Care Providers       Provider Role Specialty Phone number    Devon Langston Jr., MD [facility] Internal Medicine [phone]

## 2025-08-04 ENCOUNTER — LAB VISIT (OUTPATIENT)
Dept: LAB | Facility: HOSPITAL | Age: 85
End: 2025-08-04
Attending: INTERNAL MEDICINE
Payer: MEDICARE

## 2025-08-04 ENCOUNTER — ANTI-COAG VISIT (OUTPATIENT)
Dept: CARDIOLOGY | Facility: CLINIC | Age: 85
End: 2025-08-04
Payer: MEDICARE

## 2025-08-04 DIAGNOSIS — Z95.2 H/O MECHANICAL AORTIC VALVE REPLACEMENT: ICD-10-CM

## 2025-08-04 DIAGNOSIS — Z79.01 CHRONIC ANTICOAGULATION: ICD-10-CM

## 2025-08-04 DIAGNOSIS — Z79.01 CHRONIC ANTICOAGULATION: Primary | ICD-10-CM

## 2025-08-04 LAB
INR PPP: 1.8 (ref 0.8–1.2)
PROTHROMBIN TIME: 18.8 SECONDS (ref 9–12.5)

## 2025-08-04 PROCEDURE — 85610 PROTHROMBIN TIME: CPT | Mod: HCNC

## 2025-08-04 PROCEDURE — 93793 ANTICOAG MGMT PT WARFARIN: CPT | Mod: S$GLB,,,

## 2025-08-04 PROCEDURE — 36415 COLL VENOUS BLD VENIPUNCTURE: CPT | Mod: HCNC

## 2025-08-04 NOTE — PROGRESS NOTES
[facility]    2025 10:22 AM    Assessment/Plan:    Patient presents today with subtherapeutic  INR. Pt to start to glucerna 1/daily this afternoon, monitor INR closely.    Recommendation for patient's warfarin regimen: Continue current maintenance dose    Recommend repeat INR in 3 days.  _________________________________________________________________    Dariel Osorio Ciara (84 y.o.) is followed by the [facility].    Anticoagulation Summary  As of 2025      INR goal:  2.0-2.5   TTR:  65.3% (12.8 y)   INR used for dosin.8 (2025)   Warfarin maintenance plan:  5 mg (5 mg x 1) every Sun, Tue, Thu; 2.5 mg (5 mg x 0.5) all other days   Weekly warfarin total:  25 mg   No change documented:  Jelly Nava, PharmD   Plan last modified:  Laurie Patino, PharmD (2025)   Next INR check:  2025   Target end date:  --    Indications    Chronic anticoagulation [Z79.01]  H/O mechanical aortic valve replacement [Z95.2]                 Anticoagulation Episode Summary       INR check location:  Clinic Lab    Preferred lab:  --    Send INR reminders to:  [facility] COUMADIN MONITORING POOL    Comments:  [facility] IM - Internal Med Clinic only Mon - Thu // carpel tunnel release  - target low end of range          Anticoagulation Care Providers       Provider Role Specialty Phone number    Devon Langston Jr., MD [facility] Internal Medicine [phone]

## 2025-08-05 ENCOUNTER — DOCUMENTATION ONLY (OUTPATIENT)
Dept: CARDIOLOGY | Facility: CLINIC | Age: 85
End: 2025-08-05
Payer: MEDICARE

## 2025-08-05 ENCOUNTER — EPISODE CHANGES (OUTPATIENT)
Dept: CARDIOLOGY | Facility: CLINIC | Age: 85
End: 2025-08-05

## 2025-08-05 DIAGNOSIS — I10 BENIGN ESSENTIAL HTN: ICD-10-CM

## 2025-08-05 RX ORDER — ISOSORBIDE MONONITRATE 60 MG/1
60 TABLET, EXTENDED RELEASE ORAL NIGHTLY
Qty: 90 TABLET | Refills: 0 | Status: SHIPPED | OUTPATIENT
Start: 2025-08-05

## 2025-08-05 NOTE — TELEPHONE ENCOUNTER
Care Due:                  Date            Visit Type   Department     Provider  --------------------------------------------------------------------------------                                EP -                              PRIMARY      [facility] INTERNAL  Last Visit: 05-      CARE ([facility])   JOSHUA Langston                              [facility]                              PRIMARY      [facility] INTERNAL  Next Visit: 08-      CARE ([facility])   [facility]       Devon Langston                                                            Last  Test          Frequency    Reason                     Performed    Due Date  --------------------------------------------------------------------------------    Uric Acid...  12 months..  allopurinoL..............  07- 07-    [facility] Embedded Care Due Messages. Reference number: [id number].   8/05/2025 1:50:51 AM CDT

## 2025-08-05 NOTE — TELEPHONE ENCOUNTER
Refill Routing Note   Medication(s) are not appropriate for processing by [facility] for the following reason(s):        Drug-disease interaction  ED/Hospital Visit since last OV with provider    [facility] action(s):  Defer   Requires labs : Yes      Medication Therapy Plan: Acute blood loss anemia    Pharmacist review requested: Yes     Appointments  past 12m or future 3m with PCP    Date Provider   Last Visit   6/25/2024 Indiana Light MD   Next Visit   Visit date not found Indiana Light MD   ED visits in past 90 days: 0        Note composed:4:55 PM 08/05/2025

## 2025-08-05 NOTE — PROGRESS NOTES
[facility] ([facility]) Team notified of pt referral via [facility] (automated inbasket notification) .    Patient screened today8/5/25 by provider and RN for enrollment to program.      Pt was deemed not a candidate for enrollment at this time related to patient current admission diagnosis/ problem will not benefit from the Heart Failure [facility] Care Program.    Pt will require additional follow up planning per primary team.     If pt status, diagnosis, or treatment plan changes , please place AMB referral to [facility] ([id number]) for [facility] enrollment re-evalution.

## 2025-08-05 NOTE — TELEPHONE ENCOUNTER
Refill Routing Note   Medication(s) are not appropriate for processing by [facility] for the following reason(s):        Drug-disease interaction    [facility] action(s):  Defer     Requires labs : Yes      Medication Therapy Plan: Drug-Disease: isosorbide mononitrate and Acute blood loss anemia; Anemia; Anemia of chronic renal failure; Symptomatic anemia; Iron deficiency anemia, unspecified iron deficiency anemia type    Pharmacist review requested: Yes     Appointments  past 12m or future 3m with PCP    Date Provider   Last Visit   6/25/2024 Indiana Light MD   Next Visit   Visit date not found Indiana Light MD   ED visits in past 90 days: 0        Note composed:4:50 PM 08/05/2025

## 2025-08-12 ENCOUNTER — ANTI-COAG VISIT (OUTPATIENT)
Dept: CARDIOLOGY | Facility: CLINIC | Age: 85
End: 2025-08-12
Payer: MEDICARE

## 2025-08-12 ENCOUNTER — LAB VISIT (OUTPATIENT)
Dept: LAB | Facility: HOSPITAL | Age: 85
End: 2025-08-12
Attending: INTERNAL MEDICINE
Payer: MEDICARE

## 2025-08-12 ENCOUNTER — OFFICE VISIT (OUTPATIENT)
Dept: INTERNAL MEDICINE | Facility: CLINIC | Age: 85
End: 2025-08-12
Payer: MEDICARE

## 2025-08-12 VITALS
HEIGHT: 65 IN | OXYGEN SATURATION: 96 % | DIASTOLIC BLOOD PRESSURE: 62 MMHG | SYSTOLIC BLOOD PRESSURE: 130 MMHG | HEART RATE: 59 BPM | WEIGHT: 148.81 LBS | BODY MASS INDEX: 24.79 KG/M2

## 2025-08-12 DIAGNOSIS — Z95.2 H/O MECHANICAL AORTIC VALVE REPLACEMENT: ICD-10-CM

## 2025-08-12 DIAGNOSIS — R04.0 EPISTAXIS: ICD-10-CM

## 2025-08-12 DIAGNOSIS — D64.9 ANEMIA, UNSPECIFIED TYPE: ICD-10-CM

## 2025-08-12 DIAGNOSIS — Z79.01 CHRONIC ANTICOAGULATION: ICD-10-CM

## 2025-08-12 DIAGNOSIS — E11.51 TYPE 2 DIABETES MELLITUS WITH DIABETIC PERIPHERAL ANGIOPATHY WITHOUT GANGRENE, WITHOUT LONG-TERM CURRENT USE OF INSULIN: ICD-10-CM

## 2025-08-12 DIAGNOSIS — N18.32 STAGE 3B CHRONIC KIDNEY DISEASE: Chronic | ICD-10-CM

## 2025-08-12 DIAGNOSIS — Z79.01 CHRONIC ANTICOAGULATION: Primary | ICD-10-CM

## 2025-08-12 DIAGNOSIS — Z95.2 H/O MECHANICAL AORTIC VALVE REPLACEMENT: Primary | ICD-10-CM

## 2025-08-12 LAB
INR PPP: 2 (ref 0.8–1.2)
PROTHROMBIN TIME: 21.3 SECONDS (ref 9–12.5)

## 2025-08-12 PROCEDURE — 99999 PR PBB SHADOW E&M-EST. PATIENT-LVL III: CPT | Mod: PBBFAC,,, | Performed by: INTERNAL MEDICINE

## 2025-08-12 PROCEDURE — 36415 COLL VENOUS BLD VENIPUNCTURE: CPT

## 2025-08-12 PROCEDURE — 85610 PROTHROMBIN TIME: CPT

## 2025-08-14 ENCOUNTER — OFFICE VISIT (OUTPATIENT)
Dept: OTOLARYNGOLOGY | Facility: CLINIC | Age: 85
End: 2025-08-14
Payer: MEDICARE

## 2025-08-14 DIAGNOSIS — Z79.01 ANTICOAGULATED BY ANTICOAGULATION TREATMENT: ICD-10-CM

## 2025-08-14 DIAGNOSIS — N18.32 STAGE 3B CHRONIC KIDNEY DISEASE: ICD-10-CM

## 2025-08-14 DIAGNOSIS — R04.0 EPISTAXIS: Primary | ICD-10-CM

## 2025-08-14 DIAGNOSIS — J34.89 NASAL SEPTAL PERFORATION: ICD-10-CM

## 2025-08-14 PROCEDURE — 99213 OFFICE O/P EST LOW 20 MIN: CPT | Mod: S$GLB,,, | Performed by: STUDENT IN AN ORGANIZED HEALTH CARE EDUCATION/TRAINING PROGRAM

## 2025-08-14 PROCEDURE — 99999 PR PBB SHADOW E&M-EST. PATIENT-LVL II: CPT | Mod: PBBFAC,,, | Performed by: STUDENT IN AN ORGANIZED HEALTH CARE EDUCATION/TRAINING PROGRAM

## 2025-08-14 PROCEDURE — 1126F AMNT PAIN NOTED NONE PRSNT: CPT | Mod: CPTII,S$GLB,, | Performed by: STUDENT IN AN ORGANIZED HEALTH CARE EDUCATION/TRAINING PROGRAM

## 2025-08-14 PROCEDURE — 1159F MED LIST DOCD IN RCRD: CPT | Mod: CPTII,S$GLB,, | Performed by: STUDENT IN AN ORGANIZED HEALTH CARE EDUCATION/TRAINING PROGRAM

## 2025-08-14 RX ORDER — MUPIROCIN 20 MG/G
OINTMENT TOPICAL 3 TIMES DAILY
Qty: 22 G | Refills: 0 | Status: SHIPPED | OUTPATIENT
Start: 2025-08-14

## 2025-08-18 ENCOUNTER — TELEPHONE (OUTPATIENT)
Dept: INTERNAL MEDICINE | Facility: CLINIC | Age: 85
End: 2025-08-18
Payer: MEDICARE

## 2025-08-18 ENCOUNTER — TELEPHONE (OUTPATIENT)
Dept: OTOLARYNGOLOGY | Facility: CLINIC | Age: 85
End: 2025-08-18
Payer: MEDICARE

## 2025-08-19 RX ORDER — TRAZODONE HYDROCHLORIDE 50 MG/1
50 TABLET ORAL NIGHTLY
Qty: 90 TABLET | Refills: 3 | Status: ON HOLD | OUTPATIENT
Start: 2025-08-19

## 2025-08-21 ENCOUNTER — HOSPITAL ENCOUNTER (INPATIENT)
Facility: HOSPITAL | Age: 85
LOS: 5 days | Discharge: HOME | DRG: 144 | End: 2025-08-27
Attending: EMERGENCY MEDICINE | Admitting: STUDENT IN AN ORGANIZED HEALTH CARE EDUCATION/TRAINING PROGRAM
Payer: MEDICARE

## 2025-08-21 DIAGNOSIS — R94.31 QT PROLONGATION: ICD-10-CM

## 2025-08-21 DIAGNOSIS — R04.0 EPISTAXIS: Primary | ICD-10-CM

## 2025-08-21 DIAGNOSIS — R07.9 CHEST PAIN: ICD-10-CM

## 2025-08-21 DIAGNOSIS — Z79.01 CHRONIC ANTICOAGULATION: ICD-10-CM

## 2025-08-21 PROBLEM — I48.91 ATRIAL FIBRILLATION: Status: ACTIVE | Noted: 2023-02-06

## 2025-08-21 LAB
ABO + RH BLD: NORMAL
ABO + RH BLD: NORMAL
ABSOLUTE EOSINOPHIL (OHS): 0.03 K/UL
ABSOLUTE EOSINOPHIL (OHS): 0.06 K/UL
ABSOLUTE MONOCYTE (OHS): 0.41 K/UL (ref 0.3–1)
ABSOLUTE MONOCYTE (OHS): 0.49 K/UL (ref 0.3–1)
ABSOLUTE NEUTROPHIL COUNT (OHS): 3.45 K/UL (ref 1.8–7.7)
ABSOLUTE NEUTROPHIL COUNT (OHS): 5.18 K/UL (ref 1.8–7.7)
ALBUMIN SERPL BCP-MCNC: 3 G/DL (ref 3.5–5.2)
ALP SERPL-CCNC: 94 UNIT/L (ref 40–150)
ALT SERPL W/O P-5'-P-CCNC: 21 UNIT/L (ref 0–55)
ANION GAP (OHS): 10 MMOL/L (ref 8–16)
APTT PPP: 29.3 SECONDS (ref 21–32)
AST SERPL-CCNC: 32 UNIT/L (ref 0–50)
BASOPHILS # BLD AUTO: 0.03 K/UL
BASOPHILS # BLD AUTO: 0.04 K/UL
BASOPHILS NFR BLD AUTO: 0.6 %
BASOPHILS NFR BLD AUTO: 0.7 %
BILIRUB SERPL-MCNC: 0.6 MG/DL (ref 0.1–1)
BLD PROD TYP BPU: NORMAL
BLD PROD TYP BPU: NORMAL
BLOOD UNIT EXPIRATION DATE: NORMAL
BLOOD UNIT EXPIRATION DATE: NORMAL
BLOOD UNIT TYPE CODE: 9500
BLOOD UNIT TYPE CODE: 9500
BUN SERPL-MCNC: 45 MG/DL (ref 8–23)
CALCIUM SERPL-MCNC: 9.6 MG/DL (ref 8.7–10.5)
CHLORIDE SERPL-SCNC: 107 MMOL/L (ref 95–110)
CO2 SERPL-SCNC: 21 MMOL/L (ref 23–29)
CREAT SERPL-MCNC: 2.3 MG/DL (ref 0.5–1.4)
CROSSMATCH INTERPRETATION: NORMAL
CROSSMATCH INTERPRETATION: NORMAL
DISPENSE STATUS: NORMAL
DISPENSE STATUS: NORMAL
ERYTHROCYTE [DISTWIDTH] IN BLOOD BY AUTOMATED COUNT: 14.7 % (ref 11.5–14.5)
ERYTHROCYTE [DISTWIDTH] IN BLOOD BY AUTOMATED COUNT: 16.7 % (ref 11.5–14.5)
GFR SERPLBLD CREATININE-BSD FMLA CKD-EPI: 27 ML/MIN/1.73/M2
GLUCOSE SERPL-MCNC: 94 MG/DL (ref 70–110)
HCT VFR BLD AUTO: 17.9 % (ref 40–54)
HCT VFR BLD AUTO: 28 % (ref 40–54)
HGB BLD-MCNC: 5.7 GM/DL (ref 14–18)
HGB BLD-MCNC: 9.1 GM/DL (ref 14–18)
IMM GRANULOCYTES # BLD AUTO: 0.03 K/UL (ref 0–0.04)
IMM GRANULOCYTES # BLD AUTO: 0.06 K/UL (ref 0–0.04)
IMM GRANULOCYTES NFR BLD AUTO: 0.7 % (ref 0–0.5)
IMM GRANULOCYTES NFR BLD AUTO: 0.9 % (ref 0–0.5)
INDIRECT COOMBS: NORMAL
INR PPP: 2.3 (ref 0.8–1.2)
INR PPP: 2.5 (ref 0.8–1.2)
LYMPHOCYTES # BLD AUTO: 0.64 K/UL (ref 1–4.8)
LYMPHOCYTES # BLD AUTO: 0.66 K/UL (ref 1–4.8)
MCH RBC QN AUTO: 30.5 PG (ref 27–31)
MCH RBC QN AUTO: 31.1 PG (ref 27–31)
MCHC RBC AUTO-ENTMCNC: 31.8 G/DL (ref 32–36)
MCHC RBC AUTO-ENTMCNC: 32.5 G/DL (ref 32–36)
MCV RBC AUTO: 94 FL (ref 82–98)
MCV RBC AUTO: 98 FL (ref 82–98)
NUCLEATED RBC (/100WBC) (OHS): 0 /100 WBC
NUCLEATED RBC (/100WBC) (OHS): 0 /100 WBC
PLATELET # BLD AUTO: 151 K/UL (ref 150–450)
PLATELET # BLD AUTO: 180 K/UL (ref 150–450)
PMV BLD AUTO: 10 FL (ref 9.2–12.9)
PMV BLD AUTO: 10.6 FL (ref 9.2–12.9)
POTASSIUM SERPL-SCNC: 4.2 MMOL/L (ref 3.5–5.1)
PROT SERPL-MCNC: 6.7 GM/DL (ref 6–8.4)
PROTHROMBIN TIME: 23.5 SECONDS (ref 9–12.5)
PROTHROMBIN TIME: 25.6 SECONDS (ref 9–12.5)
RBC # BLD AUTO: 1.83 M/UL (ref 4.6–6.2)
RBC # BLD AUTO: 2.98 M/UL (ref 4.6–6.2)
RELATIVE EOSINOPHIL (OHS): 0.7 %
RELATIVE EOSINOPHIL (OHS): 0.9 %
RELATIVE LYMPHOCYTE (OHS): 10.2 % (ref 18–48)
RELATIVE LYMPHOCYTE (OHS): 13.9 % (ref 18–48)
RELATIVE MONOCYTE (OHS): 7.6 % (ref 4–15)
RELATIVE MONOCYTE (OHS): 8.9 % (ref 4–15)
RELATIVE NEUTROPHIL (OHS): 75.1 % (ref 38–73)
RELATIVE NEUTROPHIL (OHS): 79.8 % (ref 38–73)
RH BLD: NORMAL
SODIUM SERPL-SCNC: 138 MMOL/L (ref 136–145)
SPECIMEN OUTDATE: NORMAL
TROPONIN I SERPL HS-MCNC: 38 NG/L
UNIT NUMBER: NORMAL
UNIT NUMBER: NORMAL
WBC # BLD AUTO: 4.59 K/UL (ref 3.9–12.7)
WBC # BLD AUTO: 6.49 K/UL (ref 3.9–12.7)

## 2025-08-21 PROCEDURE — P9016 RBC LEUKOCYTES REDUCED: HCPCS

## 2025-08-21 PROCEDURE — 093K7ZZ CONTROL BLEEDING IN NASAL MUCOSA AND SOFT TISSUE, VIA NATURAL OR ARTIFICIAL OPENING: ICD-10-PCS | Performed by: STUDENT IN AN ORGANIZED HEALTH CARE EDUCATION/TRAINING PROGRAM

## 2025-08-21 PROCEDURE — 30233N1 TRANSFUSION OF NONAUTOLOGOUS RED BLOOD CELLS INTO PERIPHERAL VEIN, PERCUTANEOUS APPROACH: ICD-10-PCS | Performed by: EMERGENCY MEDICINE

## 2025-08-21 PROCEDURE — A4216 STERILE WATER/SALINE, 10 ML: HCPCS

## 2025-08-21 PROCEDURE — 25500020 PHARM REV CODE 255: Performed by: STUDENT IN AN ORGANIZED HEALTH CARE EDUCATION/TRAINING PROGRAM

## 2025-08-21 PROCEDURE — 99222 1ST HOSP IP/OBS MODERATE 55: CPT | Mod: HCNC,,, | Performed by: STUDENT IN AN ORGANIZED HEALTH CARE EDUCATION/TRAINING PROGRAM

## 2025-08-21 PROCEDURE — 25000003 PHARM REV CODE 250

## 2025-08-21 PROCEDURE — 85610 PROTHROMBIN TIME: CPT

## 2025-08-21 PROCEDURE — 25000003 PHARM REV CODE 250: Performed by: STUDENT IN AN ORGANIZED HEALTH CARE EDUCATION/TRAINING PROGRAM

## 2025-08-21 PROCEDURE — 80053 COMPREHEN METABOLIC PANEL: CPT

## 2025-08-21 PROCEDURE — 36415 COLL VENOUS BLD VENIPUNCTURE: CPT

## 2025-08-21 PROCEDURE — 85730 THROMBOPLASTIN TIME PARTIAL: CPT

## 2025-08-21 PROCEDURE — 85610 PROTHROMBIN TIME: CPT | Mod: 91

## 2025-08-21 PROCEDURE — G0378 HOSPITAL OBSERVATION PER HR: HCPCS

## 2025-08-21 PROCEDURE — 84484 ASSAY OF TROPONIN QUANT: CPT

## 2025-08-21 PROCEDURE — 85025 COMPLETE CBC W/AUTO DIFF WBC: CPT

## 2025-08-21 PROCEDURE — 86920 COMPATIBILITY TEST SPIN: CPT

## 2025-08-21 PROCEDURE — 99285 EMERGENCY DEPT VISIT HI MDM: CPT | Mod: 25,HCNC

## 2025-08-21 PROCEDURE — 63600175 PHARM REV CODE 636 W HCPCS

## 2025-08-21 PROCEDURE — 86850 RBC ANTIBODY SCREEN: CPT

## 2025-08-21 PROCEDURE — 36430 TRANSFUSION BLD/BLD COMPNT: CPT

## 2025-08-21 PROCEDURE — 2Y41X5Z PACKING OF NASAL REGION USING PACKING MATERIAL: ICD-10-PCS | Performed by: STUDENT IN AN ORGANIZED HEALTH CARE EDUCATION/TRAINING PROGRAM

## 2025-08-21 PROCEDURE — 25000003 PHARM REV CODE 250: Performed by: EMERGENCY MEDICINE

## 2025-08-21 RX ORDER — BUMETANIDE 1 MG/1
2 TABLET ORAL EVERY OTHER DAY
Status: DISCONTINUED | OUTPATIENT
Start: 2025-08-22 | End: 2025-08-25

## 2025-08-21 RX ORDER — IBUPROFEN 200 MG
24 TABLET ORAL
Status: DISCONTINUED | OUTPATIENT
Start: 2025-08-21 | End: 2025-08-27 | Stop reason: HOSPADM

## 2025-08-21 RX ORDER — ISOSORBIDE MONONITRATE 60 MG/1
60 TABLET, EXTENDED RELEASE ORAL NIGHTLY
Status: DISCONTINUED | OUTPATIENT
Start: 2025-08-21 | End: 2025-08-27 | Stop reason: HOSPADM

## 2025-08-21 RX ORDER — HYDROCODONE BITARTRATE AND ACETAMINOPHEN 500; 5 MG/1; MG/1
TABLET ORAL
Status: DISCONTINUED | OUTPATIENT
Start: 2025-08-21 | End: 2025-08-27 | Stop reason: HOSPADM

## 2025-08-21 RX ORDER — CEPHALEXIN 500 MG/1
500 CAPSULE ORAL EVERY 8 HOURS
Status: DISCONTINUED | OUTPATIENT
Start: 2025-08-21 | End: 2025-08-25

## 2025-08-21 RX ORDER — TALC
6 POWDER (GRAM) TOPICAL NIGHTLY PRN
Status: DISCONTINUED | OUTPATIENT
Start: 2025-08-21 | End: 2025-08-27 | Stop reason: HOSPADM

## 2025-08-21 RX ORDER — OXYMETAZOLINE HCL 0.05 %
2 SPRAY, NON-AEROSOL (ML) NASAL 3 TIMES DAILY
Status: DISCONTINUED | OUTPATIENT
Start: 2025-08-21 | End: 2025-08-27 | Stop reason: HOSPADM

## 2025-08-21 RX ORDER — ACETAMINOPHEN 325 MG/1
650 TABLET ORAL EVERY 4 HOURS PRN
Status: DISCONTINUED | OUTPATIENT
Start: 2025-08-21 | End: 2025-08-27 | Stop reason: HOSPADM

## 2025-08-21 RX ORDER — ALUMINUM HYDROXIDE, MAGNESIUM HYDROXIDE, AND SIMETHICONE 1200; 120; 1200 MG/30ML; MG/30ML; MG/30ML
30 SUSPENSION ORAL 4 TIMES DAILY PRN
Status: DISCONTINUED | OUTPATIENT
Start: 2025-08-21 | End: 2025-08-27 | Stop reason: HOSPADM

## 2025-08-21 RX ORDER — OXYMETAZOLINE HCL 0.05 %
2 SPRAY, NON-AEROSOL (ML) NASAL
Status: COMPLETED | OUTPATIENT
Start: 2025-08-21 | End: 2025-08-21

## 2025-08-21 RX ORDER — IPRATROPIUM BROMIDE AND ALBUTEROL SULFATE 2.5; .5 MG/3ML; MG/3ML
3 SOLUTION RESPIRATORY (INHALATION) EVERY 6 HOURS PRN
Status: DISCONTINUED | OUTPATIENT
Start: 2025-08-21 | End: 2025-08-27 | Stop reason: HOSPADM

## 2025-08-21 RX ORDER — ONDANSETRON 8 MG/1
8 TABLET, ORALLY DISINTEGRATING ORAL EVERY 8 HOURS PRN
Status: DISCONTINUED | OUTPATIENT
Start: 2025-08-21 | End: 2025-08-23

## 2025-08-21 RX ORDER — ALLOPURINOL 100 MG/1
100 TABLET ORAL DAILY
Status: DISCONTINUED | OUTPATIENT
Start: 2025-08-21 | End: 2025-08-27 | Stop reason: HOSPADM

## 2025-08-21 RX ORDER — NALOXONE HCL 0.4 MG/ML
0.02 VIAL (ML) INJECTION
Status: DISCONTINUED | OUTPATIENT
Start: 2025-08-21 | End: 2025-08-27 | Stop reason: HOSPADM

## 2025-08-21 RX ORDER — SIMETHICONE 80 MG
1 TABLET,CHEWABLE ORAL 4 TIMES DAILY PRN
Status: DISCONTINUED | OUTPATIENT
Start: 2025-08-21 | End: 2025-08-27 | Stop reason: HOSPADM

## 2025-08-21 RX ORDER — TRAZODONE HYDROCHLORIDE 50 MG/1
50 TABLET ORAL NIGHTLY
Status: DISCONTINUED | OUTPATIENT
Start: 2025-08-21 | End: 2025-08-27 | Stop reason: HOSPADM

## 2025-08-21 RX ORDER — FERROUS GLUCONATE 324(38)MG
324 TABLET ORAL
Status: DISCONTINUED | OUTPATIENT
Start: 2025-08-22 | End: 2025-08-27 | Stop reason: HOSPADM

## 2025-08-21 RX ORDER — SODIUM BICARBONATE 650 MG/1
650 TABLET ORAL DAILY
Status: DISCONTINUED | OUTPATIENT
Start: 2025-08-21 | End: 2025-08-27 | Stop reason: HOSPADM

## 2025-08-21 RX ORDER — GLUCAGON 1 MG
1 KIT INJECTION
Status: DISCONTINUED | OUTPATIENT
Start: 2025-08-21 | End: 2025-08-23

## 2025-08-21 RX ORDER — POLYETHYLENE GLYCOL 3350 17 G/17G
17 POWDER, FOR SOLUTION ORAL DAILY PRN
Status: DISCONTINUED | OUTPATIENT
Start: 2025-08-21 | End: 2025-08-27 | Stop reason: HOSPADM

## 2025-08-21 RX ORDER — SODIUM CHLORIDE 0.9 % (FLUSH) 0.9 %
10 SYRINGE (ML) INJECTION EVERY 8 HOURS
Status: DISCONTINUED | OUTPATIENT
Start: 2025-08-21 | End: 2025-08-27 | Stop reason: HOSPADM

## 2025-08-21 RX ORDER — OXYMETAZOLINE HCL 0.05 %
2 SPRAY, NON-AEROSOL (ML) NASAL
Status: DISCONTINUED | OUTPATIENT
Start: 2025-08-21 | End: 2025-08-21

## 2025-08-21 RX ORDER — ATORVASTATIN CALCIUM 40 MG/1
80 TABLET, FILM COATED ORAL NIGHTLY
Status: DISCONTINUED | OUTPATIENT
Start: 2025-08-21 | End: 2025-08-27 | Stop reason: HOSPADM

## 2025-08-21 RX ORDER — IBUPROFEN 200 MG
16 TABLET ORAL
Status: DISCONTINUED | OUTPATIENT
Start: 2025-08-21 | End: 2025-08-27 | Stop reason: HOSPADM

## 2025-08-21 RX ORDER — PROCHLORPERAZINE EDISYLATE 5 MG/ML
5 INJECTION INTRAMUSCULAR; INTRAVENOUS EVERY 6 HOURS PRN
Status: DISCONTINUED | OUTPATIENT
Start: 2025-08-21 | End: 2025-08-27 | Stop reason: HOSPADM

## 2025-08-21 RX ADMIN — Medication 10 ML: at 02:08

## 2025-08-21 RX ADMIN — NASAL 2 SPRAY: 6.5 SPRAY NASAL at 06:08

## 2025-08-21 RX ADMIN — OXYMETAZOLINE HYDROCHLORIDE 2 SPRAY: 0.05 SPRAY NASAL at 10:08

## 2025-08-21 RX ADMIN — ISOSORBIDE MONONITRATE 60 MG: 60 TABLET, EXTENDED RELEASE ORAL at 10:08

## 2025-08-21 RX ADMIN — ATORVASTATIN CALCIUM 80 MG: 40 TABLET, FILM COATED ORAL at 10:08

## 2025-08-21 RX ADMIN — CEPHALEXIN 500 MG: 500 CAPSULE ORAL at 10:08

## 2025-08-21 RX ADMIN — ALLOPURINOL 100 MG: 100 TABLET ORAL at 02:08

## 2025-08-21 RX ADMIN — TRAZODONE HYDROCHLORIDE 50 MG: 50 TABLET ORAL at 10:08

## 2025-08-21 RX ADMIN — SODIUM BICARBONATE 650 MG TABLET 650 MG: at 02:08

## 2025-08-21 RX ADMIN — IOHEXOL 100 ML: 350 INJECTION, SOLUTION INTRAVENOUS at 03:08

## 2025-08-21 RX ADMIN — Medication 10 ML: at 10:08

## 2025-08-21 RX ADMIN — Medication 2 SPRAY: at 10:08

## 2025-08-21 RX ADMIN — NASAL 2 SPRAY: 6.5 SPRAY NASAL at 10:08

## 2025-08-21 RX ADMIN — PHYTONADIONE 5 MG: 10 INJECTION, EMULSION INTRAMUSCULAR; INTRAVENOUS; SUBCUTANEOUS at 02:08

## 2025-08-22 ENCOUNTER — ANESTHESIA EVENT (OUTPATIENT)
Dept: INTERVENTIONAL RADIOLOGY/VASCULAR | Facility: HOSPITAL | Age: 85
End: 2025-08-22
Payer: MEDICARE

## 2025-08-22 LAB
ABSOLUTE EOSINOPHIL (OHS): 0.12 K/UL
ABSOLUTE MONOCYTE (OHS): 0.68 K/UL (ref 0.3–1)
ABSOLUTE NEUTROPHIL COUNT (OHS): 3.12 K/UL (ref 1.8–7.7)
ALBUMIN SERPL BCP-MCNC: 2.7 G/DL (ref 3.5–5.2)
ALP SERPL-CCNC: 82 UNIT/L (ref 40–150)
ALT SERPL W/O P-5'-P-CCNC: 15 UNIT/L (ref 0–55)
ANION GAP (OHS): 7 MMOL/L (ref 8–16)
AST SERPL-CCNC: 25 UNIT/L (ref 0–50)
BASOPHILS # BLD AUTO: 0.04 K/UL
BASOPHILS NFR BLD AUTO: 0.8 %
BILIRUB SERPL-MCNC: 1.3 MG/DL (ref 0.1–1)
BUN SERPL-MCNC: 44 MG/DL (ref 8–23)
CALCIUM SERPL-MCNC: 9.5 MG/DL (ref 8.7–10.5)
CHLORIDE SERPL-SCNC: 110 MMOL/L (ref 95–110)
CO2 SERPL-SCNC: 22 MMOL/L (ref 23–29)
CREAT SERPL-MCNC: 2.2 MG/DL (ref 0.5–1.4)
ERYTHROCYTE [DISTWIDTH] IN BLOOD BY AUTOMATED COUNT: 17.4 % (ref 11.5–14.5)
GFR SERPLBLD CREATININE-BSD FMLA CKD-EPI: 29 ML/MIN/1.73/M2
GLUCOSE SERPL-MCNC: 73 MG/DL (ref 70–110)
HCT VFR BLD AUTO: 25.2 % (ref 40–54)
HGB BLD-MCNC: 8.1 GM/DL (ref 14–18)
IMM GRANULOCYTES # BLD AUTO: 0.02 K/UL (ref 0–0.04)
IMM GRANULOCYTES NFR BLD AUTO: 0.4 % (ref 0–0.5)
INR PPP: 1.7 (ref 0.8–1.2)
LYMPHOCYTES # BLD AUTO: 0.91 K/UL (ref 1–4.8)
MAGNESIUM SERPL-MCNC: 2 MG/DL (ref 1.6–2.6)
MCH RBC QN AUTO: 30 PG (ref 27–31)
MCHC RBC AUTO-ENTMCNC: 32.1 G/DL (ref 32–36)
MCV RBC AUTO: 93 FL (ref 82–98)
NUCLEATED RBC (/100WBC) (OHS): 0 /100 WBC
PHOSPHATE SERPL-MCNC: 3.3 MG/DL (ref 2.7–4.5)
PLATELET # BLD AUTO: 153 K/UL (ref 150–450)
PMV BLD AUTO: 10.5 FL (ref 9.2–12.9)
POTASSIUM SERPL-SCNC: 4.7 MMOL/L (ref 3.5–5.1)
PROT SERPL-MCNC: 6 GM/DL (ref 6–8.4)
PROTHROMBIN TIME: 17.9 SECONDS (ref 9–12.5)
RBC # BLD AUTO: 2.7 M/UL (ref 4.6–6.2)
RELATIVE EOSINOPHIL (OHS): 2.5 %
RELATIVE LYMPHOCYTE (OHS): 18.6 % (ref 18–48)
RELATIVE MONOCYTE (OHS): 13.9 % (ref 4–15)
RELATIVE NEUTROPHIL (OHS): 63.8 % (ref 38–73)
SODIUM SERPL-SCNC: 139 MMOL/L (ref 136–145)
WBC # BLD AUTO: 4.89 K/UL (ref 3.9–12.7)

## 2025-08-22 PROCEDURE — 36415 COLL VENOUS BLD VENIPUNCTURE: CPT | Performed by: STUDENT IN AN ORGANIZED HEALTH CARE EDUCATION/TRAINING PROGRAM

## 2025-08-22 PROCEDURE — 03LG3DZ OCCLUSION OF INTRACRANIAL ARTERY WITH INTRALUMINAL DEVICE, PERCUTANEOUS APPROACH: ICD-10-PCS | Performed by: RADIOLOGY

## 2025-08-22 PROCEDURE — 85025 COMPLETE CBC W/AUTO DIFF WBC: CPT

## 2025-08-22 PROCEDURE — 25000003 PHARM REV CODE 250: Performed by: STUDENT IN AN ORGANIZED HEALTH CARE EDUCATION/TRAINING PROGRAM

## 2025-08-22 PROCEDURE — 03LN3DZ OCCLUSION OF LEFT EXTERNAL CAROTID ARTERY WITH INTRALUMINAL DEVICE, PERCUTANEOUS APPROACH: ICD-10-PCS | Performed by: RADIOLOGY

## 2025-08-22 PROCEDURE — 03LM3DZ OCCLUSION OF RIGHT EXTERNAL CAROTID ARTERY WITH INTRALUMINAL DEVICE, PERCUTANEOUS APPROACH: ICD-10-PCS | Performed by: RADIOLOGY

## 2025-08-22 PROCEDURE — 25000003 PHARM REV CODE 250

## 2025-08-22 PROCEDURE — B31CYZZ FLUOROSCOPY OF BILATERAL EXTERNAL CAROTID ARTERIES USING OTHER CONTRAST: ICD-10-PCS | Performed by: RADIOLOGY

## 2025-08-22 PROCEDURE — 63600175 PHARM REV CODE 636 W HCPCS: Performed by: STUDENT IN AN ORGANIZED HEALTH CARE EDUCATION/TRAINING PROGRAM

## 2025-08-22 PROCEDURE — 25500020 PHARM REV CODE 255: Performed by: STUDENT IN AN ORGANIZED HEALTH CARE EDUCATION/TRAINING PROGRAM

## 2025-08-22 PROCEDURE — 84100 ASSAY OF PHOSPHORUS: CPT

## 2025-08-22 PROCEDURE — A4216 STERILE WATER/SALINE, 10 ML: HCPCS

## 2025-08-22 PROCEDURE — 21400001 HC TELEMETRY ROOM

## 2025-08-22 PROCEDURE — 25000003 PHARM REV CODE 250: Performed by: NURSE ANESTHETIST, CERTIFIED REGISTERED

## 2025-08-22 PROCEDURE — B31RYZZ FLUOROSCOPY OF INTRACRANIAL ARTERIES USING OTHER CONTRAST: ICD-10-PCS | Performed by: RADIOLOGY

## 2025-08-22 PROCEDURE — 83735 ASSAY OF MAGNESIUM: CPT

## 2025-08-22 PROCEDURE — B315YZZ FLUOROSCOPY OF BILATERAL COMMON CAROTID ARTERIES USING OTHER CONTRAST: ICD-10-PCS | Performed by: RADIOLOGY

## 2025-08-22 PROCEDURE — 36415 COLL VENOUS BLD VENIPUNCTURE: CPT

## 2025-08-22 PROCEDURE — 80053 COMPREHEN METABOLIC PANEL: CPT

## 2025-08-22 RX ORDER — ONDANSETRON HYDROCHLORIDE 2 MG/ML
4 INJECTION, SOLUTION INTRAVENOUS DAILY PRN
Status: DISCONTINUED | OUTPATIENT
Start: 2025-08-22 | End: 2025-08-27 | Stop reason: HOSPADM

## 2025-08-22 RX ORDER — VASOPRESSIN 20 [USP'U]/ML
INJECTION, SOLUTION INTRAMUSCULAR; SUBCUTANEOUS
Status: DISCONTINUED | OUTPATIENT
Start: 2025-08-22 | End: 2025-08-22

## 2025-08-22 RX ORDER — ONDANSETRON HYDROCHLORIDE 2 MG/ML
INJECTION, SOLUTION INTRAVENOUS
Status: DISCONTINUED | OUTPATIENT
Start: 2025-08-22 | End: 2025-08-22

## 2025-08-22 RX ORDER — GLUCAGON 1 MG
1 KIT INJECTION
Status: DISCONTINUED | OUTPATIENT
Start: 2025-08-22 | End: 2025-08-27 | Stop reason: HOSPADM

## 2025-08-22 RX ORDER — SODIUM CHLORIDE 0.9 % (FLUSH) 0.9 %
10 SYRINGE (ML) INJECTION
Status: DISCONTINUED | OUTPATIENT
Start: 2025-08-22 | End: 2025-08-27 | Stop reason: HOSPADM

## 2025-08-22 RX ORDER — IODIXANOL 320 MG/ML
200 INJECTION, SOLUTION INTRAVASCULAR
Status: COMPLETED | OUTPATIENT
Start: 2025-08-22 | End: 2025-08-22

## 2025-08-22 RX ORDER — FENTANYL CITRATE 50 UG/ML
INJECTION, SOLUTION INTRAMUSCULAR; INTRAVENOUS
Status: DISCONTINUED | OUTPATIENT
Start: 2025-08-22 | End: 2025-08-22

## 2025-08-22 RX ORDER — LIDOCAINE HYDROCHLORIDE 20 MG/ML
INJECTION INTRAVENOUS
Status: DISCONTINUED | OUTPATIENT
Start: 2025-08-22 | End: 2025-08-22

## 2025-08-22 RX ORDER — WARFARIN SODIUM 5 MG/1
TABLET ORAL
COMMUNITY

## 2025-08-22 RX ORDER — ETOMIDATE 2 MG/ML
INJECTION INTRAVENOUS
Status: DISCONTINUED | OUTPATIENT
Start: 2025-08-22 | End: 2025-08-22

## 2025-08-22 RX ORDER — DEXMEDETOMIDINE HYDROCHLORIDE 100 UG/ML
INJECTION, SOLUTION INTRAVENOUS
Status: DISCONTINUED | OUTPATIENT
Start: 2025-08-22 | End: 2025-08-22

## 2025-08-22 RX ORDER — PROPOFOL 10 MG/ML
VIAL (ML) INTRAVENOUS
Status: DISCONTINUED | OUTPATIENT
Start: 2025-08-22 | End: 2025-08-22

## 2025-08-22 RX ORDER — FENTANYL CITRATE 50 UG/ML
25 INJECTION, SOLUTION INTRAMUSCULAR; INTRAVENOUS EVERY 5 MIN PRN
Status: DISCONTINUED | OUTPATIENT
Start: 2025-08-22 | End: 2025-08-23

## 2025-08-22 RX ORDER — DEXAMETHASONE SODIUM PHOSPHATE 4 MG/ML
INJECTION, SOLUTION INTRA-ARTICULAR; INTRALESIONAL; INTRAMUSCULAR; INTRAVENOUS; SOFT TISSUE
Status: DISCONTINUED | OUTPATIENT
Start: 2025-08-22 | End: 2025-08-22

## 2025-08-22 RX ORDER — ROCURONIUM BROMIDE 10 MG/ML
INJECTION, SOLUTION INTRAVENOUS
Status: DISCONTINUED | OUTPATIENT
Start: 2025-08-22 | End: 2025-08-22

## 2025-08-22 RX ADMIN — ROCURONIUM BROMIDE 20 MG: 10 INJECTION, SOLUTION INTRAVENOUS at 06:08

## 2025-08-22 RX ADMIN — Medication 10 ML: at 05:08

## 2025-08-22 RX ADMIN — IODIXANOL 150 ML: 320 INJECTION, SOLUTION INTRAVASCULAR at 07:08

## 2025-08-22 RX ADMIN — CEPHALEXIN 500 MG: 500 CAPSULE ORAL at 10:08

## 2025-08-22 RX ADMIN — VASOPRESSIN 1 UNITS: 20 INJECTION INTRAVENOUS at 06:08

## 2025-08-22 RX ADMIN — NASAL 2 SPRAY: 6.5 SPRAY NASAL at 01:08

## 2025-08-22 RX ADMIN — ISOSORBIDE MONONITRATE 60 MG: 60 TABLET, EXTENDED RELEASE ORAL at 10:08

## 2025-08-22 RX ADMIN — Medication 10 ML: at 01:08

## 2025-08-22 RX ADMIN — CEPHALEXIN 500 MG: 500 CAPSULE ORAL at 01:08

## 2025-08-22 RX ADMIN — TRAZODONE HYDROCHLORIDE 50 MG: 50 TABLET ORAL at 10:08

## 2025-08-22 RX ADMIN — ALLOPURINOL 100 MG: 100 TABLET ORAL at 08:08

## 2025-08-22 RX ADMIN — NASAL 2 SPRAY: 6.5 SPRAY NASAL at 08:08

## 2025-08-22 RX ADMIN — ONDANSETRON 4 MG: 2 INJECTION INTRAMUSCULAR; INTRAVENOUS at 06:08

## 2025-08-22 RX ADMIN — LIDOCAINE HYDROCHLORIDE 60 MG: 20 INJECTION INTRAVENOUS at 06:08

## 2025-08-22 RX ADMIN — Medication 2 SPRAY: at 08:08

## 2025-08-22 RX ADMIN — CEPHALEXIN 500 MG: 500 CAPSULE ORAL at 05:08

## 2025-08-22 RX ADMIN — Medication 2 SPRAY: at 09:08

## 2025-08-22 RX ADMIN — DEXAMETHASONE SODIUM PHOSPHATE 8 MG: 4 INJECTION, SOLUTION INTRAMUSCULAR; INTRAVENOUS at 06:08

## 2025-08-22 RX ADMIN — SODIUM CHLORIDE, SODIUM GLUCONATE, SODIUM ACETATE, POTASSIUM CHLORIDE, MAGNESIUM CHLORIDE, SODIUM PHOSPHATE, DIBASIC, AND POTASSIUM PHOSPHATE: .53; .5; .37; .037; .03; .012; .00082 INJECTION, SOLUTION INTRAVENOUS at 06:08

## 2025-08-22 RX ADMIN — SODIUM BICARBONATE 650 MG TABLET 650 MG: at 08:08

## 2025-08-22 RX ADMIN — BUMETANIDE 2 MG: 1 TABLET ORAL at 08:08

## 2025-08-22 RX ADMIN — FENTANYL CITRATE 25 MCG: 50 INJECTION, SOLUTION INTRAMUSCULAR; INTRAVENOUS at 06:08

## 2025-08-22 RX ADMIN — PHENYLEPHRINE HYDROCHLORIDE 0.5 MCG/KG/MIN: 10 INJECTION INTRAVENOUS at 06:08

## 2025-08-22 RX ADMIN — PROPOFOL 70 MG: 10 INJECTION, EMULSION INTRAVENOUS at 06:08

## 2025-08-22 RX ADMIN — ETOMIDATE 6 MG: 2 INJECTION, SOLUTION INTRAVENOUS at 06:08

## 2025-08-22 RX ADMIN — ATORVASTATIN CALCIUM 80 MG: 40 TABLET, FILM COATED ORAL at 10:08

## 2025-08-22 RX ADMIN — ROCURONIUM BROMIDE 60 MG: 10 INJECTION, SOLUTION INTRAVENOUS at 06:08

## 2025-08-22 RX ADMIN — ONDANSETRON 4 MG: 2 INJECTION INTRAMUSCULAR; INTRAVENOUS at 07:08

## 2025-08-22 RX ADMIN — SUGAMMADEX 400 MG: 100 INJECTION, SOLUTION INTRAVENOUS at 07:08

## 2025-08-22 RX ADMIN — Medication 10 ML: at 10:08

## 2025-08-22 RX ADMIN — DEXMEDETOMIDINE 12 MCG: 200 INJECTION, SOLUTION INTRAVENOUS at 07:08

## 2025-08-23 LAB
ABSOLUTE EOSINOPHIL (OHS): 0 K/UL
ABSOLUTE MONOCYTE (OHS): 0.04 K/UL (ref 0.3–1)
ABSOLUTE NEUTROPHIL COUNT (OHS): 3.57 K/UL (ref 1.8–7.7)
ALBUMIN SERPL BCP-MCNC: 2.9 G/DL (ref 3.5–5.2)
ALP SERPL-CCNC: 96 UNIT/L (ref 40–150)
ALT SERPL W/O P-5'-P-CCNC: 14 UNIT/L (ref 0–55)
ANION GAP (OHS): 14 MMOL/L (ref 8–16)
ANISOCYTOSIS BLD QL SMEAR: SLIGHT
AST SERPL-CCNC: 24 UNIT/L (ref 0–50)
BASOPHILS # BLD AUTO: 0.01 K/UL
BASOPHILS NFR BLD AUTO: 0.3 %
BILIRUB SERPL-MCNC: 1 MG/DL (ref 0.1–1)
BUN SERPL-MCNC: 48 MG/DL (ref 8–23)
CALCIUM SERPL-MCNC: 9.7 MG/DL (ref 8.7–10.5)
CHLORIDE SERPL-SCNC: 105 MMOL/L (ref 95–110)
CO2 SERPL-SCNC: 20 MMOL/L (ref 23–29)
CREAT SERPL-MCNC: 2.3 MG/DL (ref 0.5–1.4)
ERYTHROCYTE [DISTWIDTH] IN BLOOD BY AUTOMATED COUNT: 16.5 % (ref 11.5–14.5)
GFR SERPLBLD CREATININE-BSD FMLA CKD-EPI: 27 ML/MIN/1.73/M2
GLUCOSE SERPL-MCNC: 81 MG/DL (ref 70–110)
HCT VFR BLD AUTO: 27 % (ref 40–54)
HGB BLD-MCNC: 8.8 GM/DL (ref 14–18)
IMM GRANULOCYTES # BLD AUTO: 0.02 K/UL (ref 0–0.04)
IMM GRANULOCYTES NFR BLD AUTO: 0.5 % (ref 0–0.5)
INR PPP: 1.2 (ref 0.8–1.2)
LYMPHOCYTES # BLD AUTO: 0.32 K/UL (ref 1–4.8)
MAGNESIUM SERPL-MCNC: 2 MG/DL (ref 1.6–2.6)
MCH RBC QN AUTO: 30.4 PG (ref 27–31)
MCHC RBC AUTO-ENTMCNC: 32.6 G/DL (ref 32–36)
MCV RBC AUTO: 93 FL (ref 82–98)
NUCLEATED RBC (/100WBC) (OHS): 0 /100 WBC
PHOSPHATE SERPL-MCNC: 4.9 MG/DL (ref 2.7–4.5)
PLATELET # BLD AUTO: 162 K/UL (ref 150–450)
PLATELET BLD QL SMEAR: NORMAL
PMV BLD AUTO: 10.2 FL (ref 9.2–12.9)
POCT GLUCOSE: 194 MG/DL (ref 70–110)
POCT GLUCOSE: 241 MG/DL (ref 70–110)
POCT GLUCOSE: 86 MG/DL (ref 70–110)
POIKILOCYTOSIS BLD QL SMEAR: SLIGHT
POTASSIUM SERPL-SCNC: 4.6 MMOL/L (ref 3.5–5.1)
PROT SERPL-MCNC: 6.6 GM/DL (ref 6–8.4)
PROTHROMBIN TIME: 13.2 SECONDS (ref 9–12.5)
RBC # BLD AUTO: 2.89 M/UL (ref 4.6–6.2)
RELATIVE EOSINOPHIL (OHS): 0 %
RELATIVE LYMPHOCYTE (OHS): 8.1 % (ref 18–48)
RELATIVE MONOCYTE (OHS): 1 % (ref 4–15)
RELATIVE NEUTROPHIL (OHS): 90.1 % (ref 38–73)
SCHISTOCYTES BLD QL SMEAR: NORMAL
SODIUM SERPL-SCNC: 139 MMOL/L (ref 136–145)
WBC # BLD AUTO: 3.96 K/UL (ref 3.9–12.7)

## 2025-08-23 PROCEDURE — 80053 COMPREHEN METABOLIC PANEL: CPT

## 2025-08-23 PROCEDURE — 25000003 PHARM REV CODE 250

## 2025-08-23 PROCEDURE — 25000003 PHARM REV CODE 250: Performed by: STUDENT IN AN ORGANIZED HEALTH CARE EDUCATION/TRAINING PROGRAM

## 2025-08-23 PROCEDURE — 21400001 HC TELEMETRY ROOM

## 2025-08-23 PROCEDURE — 85025 COMPLETE CBC W/AUTO DIFF WBC: CPT

## 2025-08-23 PROCEDURE — 36415 COLL VENOUS BLD VENIPUNCTURE: CPT

## 2025-08-23 PROCEDURE — 84100 ASSAY OF PHOSPHORUS: CPT

## 2025-08-23 PROCEDURE — A4216 STERILE WATER/SALINE, 10 ML: HCPCS

## 2025-08-23 PROCEDURE — 83735 ASSAY OF MAGNESIUM: CPT

## 2025-08-23 PROCEDURE — 85610 PROTHROMBIN TIME: CPT | Performed by: STUDENT IN AN ORGANIZED HEALTH CARE EDUCATION/TRAINING PROGRAM

## 2025-08-23 PROCEDURE — 99232 SBSQ HOSP IP/OBS MODERATE 35: CPT | Mod: ,,, | Performed by: STUDENT IN AN ORGANIZED HEALTH CARE EDUCATION/TRAINING PROGRAM

## 2025-08-23 RX ORDER — WARFARIN 2.5 MG/1
5 TABLET ORAL
Status: DISCONTINUED | OUTPATIENT
Start: 2025-08-23 | End: 2025-08-26

## 2025-08-23 RX ORDER — WARFARIN 2.5 MG/1
2.5 TABLET ORAL
Status: DISCONTINUED | OUTPATIENT
Start: 2025-08-25 | End: 2025-08-26

## 2025-08-23 RX ADMIN — ISOSORBIDE MONONITRATE 60 MG: 60 TABLET, EXTENDED RELEASE ORAL at 08:08

## 2025-08-23 RX ADMIN — Medication 10 ML: at 06:08

## 2025-08-23 RX ADMIN — ALLOPURINOL 100 MG: 100 TABLET ORAL at 08:08

## 2025-08-23 RX ADMIN — Medication 10 ML: at 02:08

## 2025-08-23 RX ADMIN — CEPHALEXIN 500 MG: 500 CAPSULE ORAL at 02:08

## 2025-08-23 RX ADMIN — NASAL 2 SPRAY: 6.5 SPRAY NASAL at 05:08

## 2025-08-23 RX ADMIN — WARFARIN SODIUM 5 MG: 2.5 TABLET ORAL at 05:08

## 2025-08-23 RX ADMIN — Medication 10 ML: at 09:08

## 2025-08-23 RX ADMIN — SODIUM BICARBONATE 650 MG TABLET 650 MG: at 08:08

## 2025-08-23 RX ADMIN — Medication 2 SPRAY: at 08:08

## 2025-08-23 RX ADMIN — CEPHALEXIN 500 MG: 500 CAPSULE ORAL at 09:08

## 2025-08-23 RX ADMIN — NASAL 2 SPRAY: 6.5 SPRAY NASAL at 02:08

## 2025-08-23 RX ADMIN — ATORVASTATIN CALCIUM 80 MG: 40 TABLET, FILM COATED ORAL at 08:08

## 2025-08-23 RX ADMIN — CEPHALEXIN 500 MG: 500 CAPSULE ORAL at 05:08

## 2025-08-23 RX ADMIN — TRAZODONE HYDROCHLORIDE 50 MG: 50 TABLET ORAL at 08:08

## 2025-08-23 RX ADMIN — ACETAMINOPHEN 650 MG: 325 TABLET ORAL at 05:08

## 2025-08-23 RX ADMIN — Medication 2 SPRAY: at 02:08

## 2025-08-23 RX ADMIN — NASAL 2 SPRAY: 6.5 SPRAY NASAL at 08:08

## 2025-08-24 LAB
ABSOLUTE EOSINOPHIL (OHS): 0 K/UL
ABSOLUTE MONOCYTE (OHS): 0.55 K/UL (ref 0.3–1)
ABSOLUTE NEUTROPHIL COUNT (OHS): 7.86 K/UL (ref 1.8–7.7)
ALBUMIN SERPL BCP-MCNC: 2.7 G/DL (ref 3.5–5.2)
ALP SERPL-CCNC: 98 UNIT/L (ref 40–150)
ALT SERPL W/O P-5'-P-CCNC: 13 UNIT/L (ref 0–55)
ANION GAP (OHS): 11 MMOL/L (ref 8–16)
AST SERPL-CCNC: 22 UNIT/L (ref 0–50)
BASOPHILS # BLD AUTO: 0.01 K/UL
BASOPHILS NFR BLD AUTO: 0.1 %
BILIRUB SERPL-MCNC: 0.6 MG/DL (ref 0.1–1)
BUN SERPL-MCNC: 65 MG/DL (ref 8–23)
CALCIUM SERPL-MCNC: 9.3 MG/DL (ref 8.7–10.5)
CHLORIDE SERPL-SCNC: 103 MMOL/L (ref 95–110)
CO2 SERPL-SCNC: 20 MMOL/L (ref 23–29)
CREAT SERPL-MCNC: 3 MG/DL (ref 0.5–1.4)
CREAT UR-MCNC: 47 MG/DL (ref 23–375)
CREAT UR-MCNC: 48 MG/DL (ref 23–375)
ERYTHROCYTE [DISTWIDTH] IN BLOOD BY AUTOMATED COUNT: 15.8 % (ref 11.5–14.5)
GFR SERPLBLD CREATININE-BSD FMLA CKD-EPI: 20 ML/MIN/1.73/M2
GLUCOSE SERPL-MCNC: 130 MG/DL (ref 70–110)
HCT VFR BLD AUTO: 24.6 % (ref 40–54)
HGB BLD-MCNC: 7.7 GM/DL (ref 14–18)
IMM GRANULOCYTES # BLD AUTO: 0.04 K/UL (ref 0–0.04)
IMM GRANULOCYTES NFR BLD AUTO: 0.5 % (ref 0–0.5)
INR PPP: 1.3 (ref 0.8–1.2)
LYMPHOCYTES # BLD AUTO: 0.4 K/UL (ref 1–4.8)
MAGNESIUM SERPL-MCNC: 2.1 MG/DL (ref 1.6–2.6)
MCH RBC QN AUTO: 29.6 PG (ref 27–31)
MCHC RBC AUTO-ENTMCNC: 31.3 G/DL (ref 32–36)
MCV RBC AUTO: 95 FL (ref 82–98)
NUCLEATED RBC (/100WBC) (OHS): 0 /100 WBC
OSMOLALITY UR: 294 MOSM/KG (ref 50–1200)
PHOSPHATE SERPL-MCNC: 5.3 MG/DL (ref 2.7–4.5)
PLATELET # BLD AUTO: 164 K/UL (ref 150–450)
PMV BLD AUTO: 10.6 FL (ref 9.2–12.9)
POCT GLUCOSE: 113 MG/DL (ref 70–110)
POCT GLUCOSE: 126 MG/DL (ref 70–110)
POCT GLUCOSE: 140 MG/DL (ref 70–110)
POTASSIUM SERPL-SCNC: 4.8 MMOL/L (ref 3.5–5.1)
POTASSIUM UR-SCNC: 34 MMOL/L (ref 15–95)
PROT SERPL-MCNC: 5.9 GM/DL (ref 6–8.4)
PROT UR-MCNC: 20 MG/DL
PROT/CREAT UR: 0.42 MG/G{CREAT}
PROTHROMBIN TIME: 14.1 SECONDS (ref 9–12.5)
RBC # BLD AUTO: 2.6 M/UL (ref 4.6–6.2)
RELATIVE EOSINOPHIL (OHS): 0 %
RELATIVE LYMPHOCYTE (OHS): 4.5 % (ref 18–48)
RELATIVE MONOCYTE (OHS): 6.2 % (ref 4–15)
RELATIVE NEUTROPHIL (OHS): 88.7 % (ref 38–73)
SODIUM SERPL-SCNC: 134 MMOL/L (ref 136–145)
SODIUM UR-SCNC: 45 MMOL/L (ref 20–250)
UUN UR-MCNC: 281 MG/DL (ref 140–1050)
WBC # BLD AUTO: 8.86 K/UL (ref 3.9–12.7)

## 2025-08-24 PROCEDURE — 99232 SBSQ HOSP IP/OBS MODERATE 35: CPT | Mod: ,,, | Performed by: STUDENT IN AN ORGANIZED HEALTH CARE EDUCATION/TRAINING PROGRAM

## 2025-08-24 PROCEDURE — 84133 ASSAY OF URINE POTASSIUM: CPT

## 2025-08-24 PROCEDURE — 36415 COLL VENOUS BLD VENIPUNCTURE: CPT | Performed by: STUDENT IN AN ORGANIZED HEALTH CARE EDUCATION/TRAINING PROGRAM

## 2025-08-24 PROCEDURE — 25000003 PHARM REV CODE 250

## 2025-08-24 PROCEDURE — 82570 ASSAY OF URINE CREATININE: CPT

## 2025-08-24 PROCEDURE — 83935 ASSAY OF URINE OSMOLALITY: CPT

## 2025-08-24 PROCEDURE — 84100 ASSAY OF PHOSPHORUS: CPT

## 2025-08-24 PROCEDURE — 84300 ASSAY OF URINE SODIUM: CPT

## 2025-08-24 PROCEDURE — 25000003 PHARM REV CODE 250: Performed by: STUDENT IN AN ORGANIZED HEALTH CARE EDUCATION/TRAINING PROGRAM

## 2025-08-24 PROCEDURE — 21400001 HC TELEMETRY ROOM

## 2025-08-24 PROCEDURE — 84540 ASSAY OF URINE/UREA-N: CPT

## 2025-08-24 PROCEDURE — 80053 COMPREHEN METABOLIC PANEL: CPT

## 2025-08-24 PROCEDURE — A4216 STERILE WATER/SALINE, 10 ML: HCPCS

## 2025-08-24 PROCEDURE — 84156 ASSAY OF PROTEIN URINE: CPT

## 2025-08-24 PROCEDURE — 85610 PROTHROMBIN TIME: CPT | Performed by: STUDENT IN AN ORGANIZED HEALTH CARE EDUCATION/TRAINING PROGRAM

## 2025-08-24 PROCEDURE — 85025 COMPLETE CBC W/AUTO DIFF WBC: CPT

## 2025-08-24 PROCEDURE — 83735 ASSAY OF MAGNESIUM: CPT

## 2025-08-24 RX ADMIN — Medication 10 ML: at 05:08

## 2025-08-24 RX ADMIN — Medication 10 ML: at 02:08

## 2025-08-24 RX ADMIN — ALLOPURINOL 100 MG: 100 TABLET ORAL at 09:08

## 2025-08-24 RX ADMIN — NASAL 2 SPRAY: 6.5 SPRAY NASAL at 01:08

## 2025-08-24 RX ADMIN — CEPHALEXIN 500 MG: 500 CAPSULE ORAL at 05:08

## 2025-08-24 RX ADMIN — NASAL 2 SPRAY: 6.5 SPRAY NASAL at 09:08

## 2025-08-24 RX ADMIN — CEPHALEXIN 500 MG: 500 CAPSULE ORAL at 09:08

## 2025-08-24 RX ADMIN — WARFARIN SODIUM 5 MG: 2.5 TABLET ORAL at 05:08

## 2025-08-24 RX ADMIN — TRAZODONE HYDROCHLORIDE 50 MG: 50 TABLET ORAL at 09:08

## 2025-08-24 RX ADMIN — Medication 10 ML: at 10:08

## 2025-08-24 RX ADMIN — Medication 2 SPRAY: at 09:08

## 2025-08-24 RX ADMIN — ATORVASTATIN CALCIUM 80 MG: 40 TABLET, FILM COATED ORAL at 09:08

## 2025-08-24 RX ADMIN — BUMETANIDE 2 MG: 1 TABLET ORAL at 09:08

## 2025-08-24 RX ADMIN — NASAL 2 SPRAY: 6.5 SPRAY NASAL at 05:08

## 2025-08-24 RX ADMIN — ISOSORBIDE MONONITRATE 60 MG: 60 TABLET, EXTENDED RELEASE ORAL at 09:08

## 2025-08-24 RX ADMIN — Medication 2 SPRAY: at 05:08

## 2025-08-24 RX ADMIN — Medication 324 MG: at 09:08

## 2025-08-24 RX ADMIN — SODIUM BICARBONATE 650 MG TABLET 650 MG: at 09:08

## 2025-08-24 RX ADMIN — CEPHALEXIN 500 MG: 500 CAPSULE ORAL at 01:08

## 2025-08-25 LAB
ABO + RH BLD: NORMAL
ABSOLUTE EOSINOPHIL (OHS): 0.08 K/UL
ABSOLUTE EOSINOPHIL (OHS): 0.09 K/UL
ABSOLUTE MONOCYTE (OHS): 0.59 K/UL (ref 0.3–1)
ABSOLUTE MONOCYTE (OHS): 0.68 K/UL (ref 0.3–1)
ABSOLUTE NEUTROPHIL COUNT (OHS): 5.81 K/UL (ref 1.8–7.7)
ABSOLUTE NEUTROPHIL COUNT (OHS): 6.24 K/UL (ref 1.8–7.7)
ALBUMIN SERPL BCP-MCNC: 2.7 G/DL (ref 3.5–5.2)
ALP SERPL-CCNC: 90 UNIT/L (ref 40–150)
ALT SERPL W/O P-5'-P-CCNC: 20 UNIT/L (ref 0–55)
ANION GAP (OHS): 11 MMOL/L (ref 8–16)
AST SERPL-CCNC: 30 UNIT/L (ref 0–50)
BASOPHILS # BLD AUTO: 0.01 K/UL
BASOPHILS # BLD AUTO: 0.02 K/UL
BASOPHILS NFR BLD AUTO: 0.1 %
BASOPHILS NFR BLD AUTO: 0.3 %
BILIRUB SERPL-MCNC: 0.5 MG/DL (ref 0.1–1)
BLD PROD TYP BPU: NORMAL
BLOOD UNIT EXPIRATION DATE: NORMAL
BLOOD UNIT TYPE CODE: 9500
BUN SERPL-MCNC: 67 MG/DL (ref 8–23)
CALCIUM SERPL-MCNC: 9.1 MG/DL (ref 8.7–10.5)
CHLORIDE SERPL-SCNC: 108 MMOL/L (ref 95–110)
CO2 SERPL-SCNC: 20 MMOL/L (ref 23–29)
CREAT SERPL-MCNC: 3.4 MG/DL (ref 0.5–1.4)
CROSSMATCH INTERPRETATION: NORMAL
DISPENSE STATUS: NORMAL
ERYTHROCYTE [DISTWIDTH] IN BLOOD BY AUTOMATED COUNT: 16 % (ref 11.5–14.5)
ERYTHROCYTE [DISTWIDTH] IN BLOOD BY AUTOMATED COUNT: 16.1 % (ref 11.5–14.5)
GFR SERPLBLD CREATININE-BSD FMLA CKD-EPI: 17 ML/MIN/1.73/M2
GLUCOSE SERPL-MCNC: 76 MG/DL (ref 70–110)
HCT VFR BLD AUTO: 24.5 % (ref 40–54)
HCT VFR BLD AUTO: 24.6 % (ref 40–54)
HGB BLD-MCNC: 7.6 GM/DL (ref 14–18)
HGB BLD-MCNC: 7.8 GM/DL (ref 14–18)
IMM GRANULOCYTES # BLD AUTO: 0.03 K/UL (ref 0–0.04)
IMM GRANULOCYTES # BLD AUTO: 0.04 K/UL (ref 0–0.04)
IMM GRANULOCYTES NFR BLD AUTO: 0.4 % (ref 0–0.5)
IMM GRANULOCYTES NFR BLD AUTO: 0.5 % (ref 0–0.5)
INDIRECT COOMBS: NORMAL
INR PPP: 1.6 (ref 0.8–1.2)
LYMPHOCYTES # BLD AUTO: 0.41 K/UL (ref 1–4.8)
LYMPHOCYTES # BLD AUTO: 0.58 K/UL (ref 1–4.8)
MCH RBC QN AUTO: 29.9 PG (ref 27–31)
MCH RBC QN AUTO: 30.4 PG (ref 27–31)
MCHC RBC AUTO-ENTMCNC: 30.9 G/DL (ref 32–36)
MCHC RBC AUTO-ENTMCNC: 31.8 G/DL (ref 32–36)
MCV RBC AUTO: 95 FL (ref 82–98)
MCV RBC AUTO: 97 FL (ref 82–98)
NUCLEATED RBC (/100WBC) (OHS): 0 /100 WBC
NUCLEATED RBC (/100WBC) (OHS): 0 /100 WBC
PLATELET # BLD AUTO: 153 K/UL (ref 150–450)
PLATELET # BLD AUTO: 168 K/UL (ref 150–450)
PMV BLD AUTO: 10.2 FL (ref 9.2–12.9)
PMV BLD AUTO: 9.9 FL (ref 9.2–12.9)
POCT GLUCOSE: 116 MG/DL (ref 70–110)
POTASSIUM SERPL-SCNC: 4.6 MMOL/L (ref 3.5–5.1)
PROT SERPL-MCNC: 5.9 GM/DL (ref 6–8.4)
PROTHROMBIN TIME: 16.5 SECONDS (ref 9–12.5)
RBC # BLD AUTO: 2.54 M/UL (ref 4.6–6.2)
RBC # BLD AUTO: 2.57 M/UL (ref 4.6–6.2)
RELATIVE EOSINOPHIL (OHS): 1 %
RELATIVE EOSINOPHIL (OHS): 1.3 %
RELATIVE LYMPHOCYTE (OHS): 5.9 % (ref 18–48)
RELATIVE LYMPHOCYTE (OHS): 7.6 % (ref 18–48)
RELATIVE MONOCYTE (OHS): 8.5 % (ref 4–15)
RELATIVE MONOCYTE (OHS): 8.9 % (ref 4–15)
RELATIVE NEUTROPHIL (OHS): 81.7 % (ref 38–73)
RELATIVE NEUTROPHIL (OHS): 83.8 % (ref 38–73)
RH BLD: NORMAL
SODIUM SERPL-SCNC: 139 MMOL/L (ref 136–145)
SPECIMEN OUTDATE: NORMAL
UNIT NUMBER: NORMAL
WBC # BLD AUTO: 6.94 K/UL (ref 3.9–12.7)
WBC # BLD AUTO: 7.64 K/UL (ref 3.9–12.7)

## 2025-08-25 PROCEDURE — 86901 BLOOD TYPING SEROLOGIC RH(D): CPT

## 2025-08-25 PROCEDURE — 25000003 PHARM REV CODE 250

## 2025-08-25 PROCEDURE — 36415 COLL VENOUS BLD VENIPUNCTURE: CPT

## 2025-08-25 PROCEDURE — 85610 PROTHROMBIN TIME: CPT | Performed by: STUDENT IN AN ORGANIZED HEALTH CARE EDUCATION/TRAINING PROGRAM

## 2025-08-25 PROCEDURE — 36415 COLL VENOUS BLD VENIPUNCTURE: CPT | Performed by: STUDENT IN AN ORGANIZED HEALTH CARE EDUCATION/TRAINING PROGRAM

## 2025-08-25 PROCEDURE — 86920 COMPATIBILITY TEST SPIN: CPT

## 2025-08-25 PROCEDURE — 85025 COMPLETE CBC W/AUTO DIFF WBC: CPT

## 2025-08-25 PROCEDURE — P9016 RBC LEUKOCYTES REDUCED: HCPCS

## 2025-08-25 PROCEDURE — 25000003 PHARM REV CODE 250: Performed by: STUDENT IN AN ORGANIZED HEALTH CARE EDUCATION/TRAINING PROGRAM

## 2025-08-25 PROCEDURE — 36430 TRANSFUSION BLD/BLD COMPNT: CPT

## 2025-08-25 PROCEDURE — 21400001 HC TELEMETRY ROOM

## 2025-08-25 PROCEDURE — A4216 STERILE WATER/SALINE, 10 ML: HCPCS

## 2025-08-25 PROCEDURE — 80053 COMPREHEN METABOLIC PANEL: CPT

## 2025-08-25 RX ORDER — HYDROCODONE BITARTRATE AND ACETAMINOPHEN 500; 5 MG/1; MG/1
TABLET ORAL
Status: DISCONTINUED | OUTPATIENT
Start: 2025-08-25 | End: 2025-08-27 | Stop reason: HOSPADM

## 2025-08-25 RX ADMIN — ACETAMINOPHEN 650 MG: 325 TABLET ORAL at 09:08

## 2025-08-25 RX ADMIN — ATORVASTATIN CALCIUM 80 MG: 40 TABLET, FILM COATED ORAL at 09:08

## 2025-08-25 RX ADMIN — WARFARIN SODIUM 2.5 MG: 2.5 TABLET ORAL at 06:08

## 2025-08-25 RX ADMIN — ACETAMINOPHEN 650 MG: 325 TABLET ORAL at 04:08

## 2025-08-25 RX ADMIN — NASAL 2 SPRAY: 6.5 SPRAY NASAL at 09:08

## 2025-08-25 RX ADMIN — NASAL 2 SPRAY: 6.5 SPRAY NASAL at 12:08

## 2025-08-25 RX ADMIN — Medication 2 SPRAY: at 03:08

## 2025-08-25 RX ADMIN — ALLOPURINOL 100 MG: 100 TABLET ORAL at 09:08

## 2025-08-25 RX ADMIN — SODIUM BICARBONATE 650 MG TABLET 650 MG: at 09:08

## 2025-08-25 RX ADMIN — CEPHALEXIN 500 MG: 500 CAPSULE ORAL at 05:08

## 2025-08-25 RX ADMIN — SODIUM CHLORIDE 250 ML: 9 INJECTION, SOLUTION INTRAVENOUS at 12:08

## 2025-08-25 RX ADMIN — Medication 324 MG: at 09:08

## 2025-08-25 RX ADMIN — NASAL 2 SPRAY: 6.5 SPRAY NASAL at 05:08

## 2025-08-25 RX ADMIN — Medication 10 ML: at 09:08

## 2025-08-25 RX ADMIN — TRAZODONE HYDROCHLORIDE 50 MG: 50 TABLET ORAL at 09:08

## 2025-08-25 RX ADMIN — ISOSORBIDE MONONITRATE 60 MG: 60 TABLET, EXTENDED RELEASE ORAL at 09:08

## 2025-08-25 RX ADMIN — Medication 2 SPRAY: at 09:08

## 2025-08-25 RX ADMIN — ACETAMINOPHEN 650 MG: 325 TABLET ORAL at 11:08

## 2025-08-25 RX ADMIN — Medication 10 ML: at 05:08

## 2025-08-25 RX ADMIN — Medication 10 ML: at 06:08

## 2025-08-26 LAB
ABSOLUTE EOSINOPHIL (OHS): 0.13 K/UL
ABSOLUTE MONOCYTE (OHS): 0.79 K/UL (ref 0.3–1)
ABSOLUTE NEUTROPHIL COUNT (OHS): 6.22 K/UL (ref 1.8–7.7)
ANION GAP (OHS): 10 MMOL/L (ref 8–16)
BASOPHILS # BLD AUTO: 0.02 K/UL
BASOPHILS NFR BLD AUTO: 0.3 %
BUN SERPL-MCNC: 59 MG/DL (ref 8–23)
CALCIUM SERPL-MCNC: 8.8 MG/DL (ref 8.7–10.5)
CHLORIDE SERPL-SCNC: 110 MMOL/L (ref 95–110)
CO2 SERPL-SCNC: 18 MMOL/L (ref 23–29)
CREAT SERPL-MCNC: 3.1 MG/DL (ref 0.5–1.4)
ERYTHROCYTE [DISTWIDTH] IN BLOOD BY AUTOMATED COUNT: 16.2 % (ref 11.5–14.5)
GFR SERPLBLD CREATININE-BSD FMLA CKD-EPI: 19 ML/MIN/1.73/M2
GLUCOSE SERPL-MCNC: 82 MG/DL (ref 70–110)
HCT VFR BLD AUTO: 28.3 % (ref 40–54)
HGB BLD-MCNC: 8.8 GM/DL (ref 14–18)
IMM GRANULOCYTES # BLD AUTO: 0.04 K/UL (ref 0–0.04)
IMM GRANULOCYTES NFR BLD AUTO: 0.5 % (ref 0–0.5)
INR PPP: 2.1 (ref 0.8–1.2)
LYMPHOCYTES # BLD AUTO: 0.54 K/UL (ref 1–4.8)
MCH RBC QN AUTO: 29.7 PG (ref 27–31)
MCHC RBC AUTO-ENTMCNC: 31.1 G/DL (ref 32–36)
MCV RBC AUTO: 96 FL (ref 82–98)
NUCLEATED RBC (/100WBC) (OHS): 0 /100 WBC
PLATELET # BLD AUTO: 149 K/UL (ref 150–450)
PMV BLD AUTO: 10.4 FL (ref 9.2–12.9)
POCT GLUCOSE: 141 MG/DL (ref 70–110)
POCT GLUCOSE: 86 MG/DL (ref 70–110)
POCT GLUCOSE: 94 MG/DL (ref 70–110)
POCT GLUCOSE: 95 MG/DL (ref 70–110)
POTASSIUM SERPL-SCNC: 4.4 MMOL/L (ref 3.5–5.1)
PROTHROMBIN TIME: 21.9 SECONDS (ref 9–12.5)
RBC # BLD AUTO: 2.96 M/UL (ref 4.6–6.2)
RELATIVE EOSINOPHIL (OHS): 1.7 %
RELATIVE LYMPHOCYTE (OHS): 7 % (ref 18–48)
RELATIVE MONOCYTE (OHS): 10.2 % (ref 4–15)
RELATIVE NEUTROPHIL (OHS): 80.3 % (ref 38–73)
SODIUM SERPL-SCNC: 138 MMOL/L (ref 136–145)
WBC # BLD AUTO: 7.74 K/UL (ref 3.9–12.7)

## 2025-08-26 PROCEDURE — 85610 PROTHROMBIN TIME: CPT | Performed by: STUDENT IN AN ORGANIZED HEALTH CARE EDUCATION/TRAINING PROGRAM

## 2025-08-26 PROCEDURE — 21400001 HC TELEMETRY ROOM

## 2025-08-26 PROCEDURE — A4216 STERILE WATER/SALINE, 10 ML: HCPCS

## 2025-08-26 PROCEDURE — 93005 ELECTROCARDIOGRAM TRACING: CPT

## 2025-08-26 PROCEDURE — 80048 BASIC METABOLIC PNL TOTAL CA: CPT | Performed by: STUDENT IN AN ORGANIZED HEALTH CARE EDUCATION/TRAINING PROGRAM

## 2025-08-26 PROCEDURE — 63600175 PHARM REV CODE 636 W HCPCS

## 2025-08-26 PROCEDURE — 25000003 PHARM REV CODE 250: Performed by: STUDENT IN AN ORGANIZED HEALTH CARE EDUCATION/TRAINING PROGRAM

## 2025-08-26 PROCEDURE — 93010 ELECTROCARDIOGRAM REPORT: CPT | Mod: HCNC,,, | Performed by: INTERNAL MEDICINE

## 2025-08-26 PROCEDURE — 85025 COMPLETE CBC W/AUTO DIFF WBC: CPT | Performed by: STUDENT IN AN ORGANIZED HEALTH CARE EDUCATION/TRAINING PROGRAM

## 2025-08-26 PROCEDURE — 25000003 PHARM REV CODE 250

## 2025-08-26 PROCEDURE — 36415 COLL VENOUS BLD VENIPUNCTURE: CPT | Performed by: STUDENT IN AN ORGANIZED HEALTH CARE EDUCATION/TRAINING PROGRAM

## 2025-08-26 RX ORDER — PROCHLORPERAZINE EDISYLATE 5 MG/ML
5 INJECTION INTRAMUSCULAR; INTRAVENOUS ONCE
Status: COMPLETED | OUTPATIENT
Start: 2025-08-26 | End: 2025-08-26

## 2025-08-26 RX ORDER — WARFARIN 2.5 MG/1
2.5 TABLET ORAL
Status: DISCONTINUED | OUTPATIENT
Start: 2025-08-27 | End: 2025-08-27 | Stop reason: HOSPADM

## 2025-08-26 RX ORDER — DIPHENHYDRAMINE HYDROCHLORIDE 50 MG/ML
25 INJECTION, SOLUTION INTRAMUSCULAR; INTRAVENOUS ONCE
Status: COMPLETED | OUTPATIENT
Start: 2025-08-26 | End: 2025-08-26

## 2025-08-26 RX ORDER — WARFARIN 2.5 MG/1
5 TABLET ORAL
Status: DISCONTINUED | OUTPATIENT
Start: 2025-08-28 | End: 2025-08-27 | Stop reason: HOSPADM

## 2025-08-26 RX ORDER — WARFARIN 2.5 MG/1
2.5 TABLET ORAL ONCE
Status: COMPLETED | OUTPATIENT
Start: 2025-08-26 | End: 2025-08-26

## 2025-08-26 RX ORDER — MAGNESIUM SULFATE 1 G/100ML
1000 INJECTION INTRAVENOUS ONCE
Status: COMPLETED | OUTPATIENT
Start: 2025-08-26 | End: 2025-08-26

## 2025-08-26 RX ADMIN — NASAL 2 SPRAY: 6.5 SPRAY NASAL at 09:08

## 2025-08-26 RX ADMIN — TRAZODONE HYDROCHLORIDE 50 MG: 50 TABLET ORAL at 09:08

## 2025-08-26 RX ADMIN — ACETAMINOPHEN 650 MG: 325 TABLET ORAL at 04:08

## 2025-08-26 RX ADMIN — ACETAMINOPHEN 650 MG: 325 TABLET ORAL at 08:08

## 2025-08-26 RX ADMIN — WARFARIN SODIUM 2.5 MG: 2.5 TABLET ORAL at 05:08

## 2025-08-26 RX ADMIN — ACETAMINOPHEN 650 MG: 325 TABLET ORAL at 12:08

## 2025-08-26 RX ADMIN — NASAL 2 SPRAY: 6.5 SPRAY NASAL at 08:08

## 2025-08-26 RX ADMIN — DIPHENHYDRAMINE HYDROCHLORIDE 25 MG: 50 INJECTION, SOLUTION INTRAMUSCULAR; INTRAVENOUS at 05:08

## 2025-08-26 RX ADMIN — Medication 10 ML: at 05:08

## 2025-08-26 RX ADMIN — Medication 324 MG: at 08:08

## 2025-08-26 RX ADMIN — ACETAMINOPHEN 650 MG: 325 TABLET ORAL at 05:08

## 2025-08-26 RX ADMIN — NASAL 2 SPRAY: 6.5 SPRAY NASAL at 05:08

## 2025-08-26 RX ADMIN — PROCHLORPERAZINE EDISYLATE 5 MG: 5 INJECTION INTRAMUSCULAR; INTRAVENOUS at 05:08

## 2025-08-26 RX ADMIN — ALLOPURINOL 100 MG: 100 TABLET ORAL at 08:08

## 2025-08-26 RX ADMIN — Medication 10 ML: at 09:08

## 2025-08-26 RX ADMIN — NASAL 2 SPRAY: 6.5 SPRAY NASAL at 12:08

## 2025-08-26 RX ADMIN — ATORVASTATIN CALCIUM 80 MG: 40 TABLET, FILM COATED ORAL at 09:08

## 2025-08-26 RX ADMIN — MAGNESIUM SULFATE 1 G: 500 INJECTION, SOLUTION INTRAMUSCULAR; INTRAVENOUS at 06:08

## 2025-08-26 RX ADMIN — ISOSORBIDE MONONITRATE 60 MG: 60 TABLET, EXTENDED RELEASE ORAL at 09:08

## 2025-08-26 RX ADMIN — SODIUM BICARBONATE 650 MG TABLET 650 MG: at 08:08

## 2025-08-27 ENCOUNTER — TELEPHONE (OUTPATIENT)
Dept: ENDOSCOPY | Facility: HOSPITAL | Age: 85
End: 2025-08-27
Payer: MEDICARE

## 2025-08-27 VITALS
DIASTOLIC BLOOD PRESSURE: 58 MMHG | RESPIRATION RATE: 18 BRPM | WEIGHT: 150.38 LBS | TEMPERATURE: 98 F | HEART RATE: 69 BPM | HEIGHT: 63 IN | SYSTOLIC BLOOD PRESSURE: 155 MMHG | BODY MASS INDEX: 26.64 KG/M2 | OXYGEN SATURATION: 97 %

## 2025-08-27 LAB
ABSOLUTE EOSINOPHIL (OHS): 0.17 K/UL
ABSOLUTE MONOCYTE (OHS): 0.6 K/UL (ref 0.3–1)
ABSOLUTE NEUTROPHIL COUNT (OHS): 6.64 K/UL (ref 1.8–7.7)
ANION GAP (OHS): 11 MMOL/L (ref 8–16)
BASOPHILS # BLD AUTO: 0.03 K/UL
BASOPHILS NFR BLD AUTO: 0.4 %
BUN SERPL-MCNC: 54 MG/DL (ref 8–23)
CALCIUM SERPL-MCNC: 9.3 MG/DL (ref 8.7–10.5)
CHLORIDE SERPL-SCNC: 108 MMOL/L (ref 95–110)
CO2 SERPL-SCNC: 19 MMOL/L (ref 23–29)
CREAT SERPL-MCNC: 2.9 MG/DL (ref 0.5–1.4)
ERYTHROCYTE [DISTWIDTH] IN BLOOD BY AUTOMATED COUNT: 16 % (ref 11.5–14.5)
GFR SERPLBLD CREATININE-BSD FMLA CKD-EPI: 21 ML/MIN/1.73/M2
GLUCOSE SERPL-MCNC: 95 MG/DL (ref 70–110)
HCT VFR BLD AUTO: 28.6 % (ref 40–54)
HGB BLD-MCNC: 9.1 GM/DL (ref 14–18)
IMM GRANULOCYTES # BLD AUTO: 0.06 K/UL (ref 0–0.04)
IMM GRANULOCYTES NFR BLD AUTO: 0.7 % (ref 0–0.5)
INR PPP: 2.3 (ref 0.8–1.2)
LYMPHOCYTES # BLD AUTO: 0.54 K/UL (ref 1–4.8)
MCH RBC QN AUTO: 30.2 PG (ref 27–31)
MCHC RBC AUTO-ENTMCNC: 31.8 G/DL (ref 32–36)
MCV RBC AUTO: 95 FL (ref 82–98)
NUCLEATED RBC (/100WBC) (OHS): 0 /100 WBC
OHS QRS DURATION: 158 MS
OHS QTC CALCULATION: 464 MS
PLATELET # BLD AUTO: 159 K/UL (ref 150–450)
PMV BLD AUTO: 10.4 FL (ref 9.2–12.9)
POTASSIUM SERPL-SCNC: 4.7 MMOL/L (ref 3.5–5.1)
PROTHROMBIN TIME: 23.5 SECONDS (ref 9–12.5)
RBC # BLD AUTO: 3.01 M/UL (ref 4.6–6.2)
RELATIVE EOSINOPHIL (OHS): 2.1 %
RELATIVE LYMPHOCYTE (OHS): 6.7 % (ref 18–48)
RELATIVE MONOCYTE (OHS): 7.5 % (ref 4–15)
RELATIVE NEUTROPHIL (OHS): 82.6 % (ref 38–73)
SODIUM SERPL-SCNC: 138 MMOL/L (ref 136–145)
WBC # BLD AUTO: 8.04 K/UL (ref 3.9–12.7)

## 2025-08-27 PROCEDURE — 85025 COMPLETE CBC W/AUTO DIFF WBC: CPT

## 2025-08-27 PROCEDURE — 25000003 PHARM REV CODE 250

## 2025-08-27 PROCEDURE — 80048 BASIC METABOLIC PNL TOTAL CA: CPT | Performed by: STUDENT IN AN ORGANIZED HEALTH CARE EDUCATION/TRAINING PROGRAM

## 2025-08-27 PROCEDURE — A4216 STERILE WATER/SALINE, 10 ML: HCPCS

## 2025-08-27 PROCEDURE — 36415 COLL VENOUS BLD VENIPUNCTURE: CPT | Performed by: STUDENT IN AN ORGANIZED HEALTH CARE EDUCATION/TRAINING PROGRAM

## 2025-08-27 PROCEDURE — 85610 PROTHROMBIN TIME: CPT | Performed by: STUDENT IN AN ORGANIZED HEALTH CARE EDUCATION/TRAINING PROGRAM

## 2025-08-27 PROCEDURE — 25000003 PHARM REV CODE 250: Performed by: STUDENT IN AN ORGANIZED HEALTH CARE EDUCATION/TRAINING PROGRAM

## 2025-08-27 RX ORDER — BUTALBITAL, ACETAMINOPHEN AND CAFFEINE 50; 325; 40 MG/1; MG/1; MG/1
1 TABLET ORAL EVERY 4 HOURS PRN
Status: DISCONTINUED | OUTPATIENT
Start: 2025-08-27 | End: 2025-08-27 | Stop reason: HOSPADM

## 2025-08-27 RX ORDER — VITAMIN A 3000 MCG
1 CAPSULE ORAL
Qty: 44 ML | Refills: 12 | Status: SHIPPED | OUTPATIENT
Start: 2025-08-27 | End: 2025-09-11

## 2025-08-27 RX ADMIN — ALLOPURINOL 100 MG: 100 TABLET ORAL at 08:08

## 2025-08-27 RX ADMIN — Medication 324 MG: at 08:08

## 2025-08-27 RX ADMIN — BUTALBITAL, ACETAMINOPHEN, AND CAFFEINE 1 TABLET: 50; 325; 40 TABLET ORAL at 10:08

## 2025-08-27 RX ADMIN — NASAL 2 SPRAY: 6.5 SPRAY NASAL at 08:08

## 2025-08-27 RX ADMIN — SODIUM BICARBONATE 650 MG TABLET 650 MG: at 08:08

## 2025-08-27 RX ADMIN — Medication 10 ML: at 08:08

## 2025-08-27 RX ADMIN — ACETAMINOPHEN 650 MG: 325 TABLET ORAL at 08:08

## 2025-08-28 ENCOUNTER — ANTI-COAG VISIT (OUTPATIENT)
Dept: CARDIOLOGY | Facility: CLINIC | Age: 85
End: 2025-08-28
Payer: MEDICARE

## 2025-08-28 DIAGNOSIS — Z95.2 H/O MECHANICAL AORTIC VALVE REPLACEMENT: ICD-10-CM

## 2025-08-28 DIAGNOSIS — Z79.01 CHRONIC ANTICOAGULATION: Primary | ICD-10-CM

## 2025-08-31 ENCOUNTER — ANESTHESIA EVENT (OUTPATIENT)
Dept: ENDOSCOPY | Facility: HOSPITAL | Age: 85
End: 2025-08-31
Payer: MEDICARE

## 2025-09-02 ENCOUNTER — ANESTHESIA (OUTPATIENT)
Dept: ENDOSCOPY | Facility: HOSPITAL | Age: 85
End: 2025-09-02
Payer: MEDICARE

## 2025-09-02 PROCEDURE — 25000003 PHARM REV CODE 250: Mod: HCNC

## 2025-09-02 PROCEDURE — 63600175 PHARM REV CODE 636 W HCPCS: Mod: HCNC

## 2025-09-02 RX ORDER — LIDOCAINE HYDROCHLORIDE 20 MG/ML
INJECTION INTRAVENOUS
Status: DISCONTINUED | OUTPATIENT
Start: 2025-09-02 | End: 2025-09-02

## 2025-09-02 RX ORDER — PROPOFOL 10 MG/ML
VIAL (ML) INTRAVENOUS
Status: DISCONTINUED | OUTPATIENT
Start: 2025-09-02 | End: 2025-09-02

## 2025-09-02 RX ADMIN — PROPOFOL 150 MCG/KG/MIN: 10 INJECTION, EMULSION INTRAVENOUS at 01:09

## 2025-09-02 RX ADMIN — PROPOFOL 60 MG: 10 INJECTION, EMULSION INTRAVENOUS at 01:09

## 2025-09-02 RX ADMIN — SODIUM CHLORIDE: 9 INJECTION, SOLUTION INTRAVENOUS at 01:09

## 2025-09-02 RX ADMIN — LIDOCAINE HYDROCHLORIDE 60 MG: 20 INJECTION INTRAVENOUS at 01:09

## 2025-09-03 ENCOUNTER — RESULTS FOLLOW-UP (OUTPATIENT)
Dept: INTERNAL MEDICINE | Facility: CLINIC | Age: 85
End: 2025-09-03

## 2025-09-03 ENCOUNTER — OFFICE VISIT (OUTPATIENT)
Dept: INTERNAL MEDICINE | Facility: CLINIC | Age: 85
End: 2025-09-03
Payer: MEDICARE

## 2025-09-03 VITALS
BODY MASS INDEX: 26.41 KG/M2 | HEIGHT: 63 IN | DIASTOLIC BLOOD PRESSURE: 52 MMHG | OXYGEN SATURATION: 96 % | HEART RATE: 69 BPM | SYSTOLIC BLOOD PRESSURE: 120 MMHG | WEIGHT: 149.06 LBS

## 2025-09-03 DIAGNOSIS — R04.0 RECURRENT EPISTAXIS: Primary | ICD-10-CM

## 2025-09-03 DIAGNOSIS — J32.9 SINUSITIS, UNSPECIFIED CHRONICITY, UNSPECIFIED LOCATION: ICD-10-CM

## 2025-09-03 DIAGNOSIS — N18.32 STAGE 3B CHRONIC KIDNEY DISEASE: ICD-10-CM

## 2025-09-03 PROCEDURE — 99999 PR PBB SHADOW E&M-EST. PATIENT-LVL IV: CPT | Mod: PBBFAC,HCNC,, | Performed by: NURSE PRACTITIONER

## 2025-09-03 RX ORDER — AMOXICILLIN AND CLAVULANATE POTASSIUM 875; 125 MG/1; MG/1
1 TABLET, FILM COATED ORAL EVERY 12 HOURS
Qty: 14 TABLET | Refills: 0 | Status: SHIPPED | OUTPATIENT
Start: 2025-09-03 | End: 2025-09-10

## 2025-09-05 ENCOUNTER — TELEPHONE (OUTPATIENT)
Dept: NEPHROLOGY | Facility: CLINIC | Age: 85
End: 2025-09-05
Payer: MEDICARE

## (undated) DEVICE — DRESSING TRANS 4X4 TEGADERM

## (undated) DEVICE — SPONGE COTTON TRAY 4X4IN

## (undated) DEVICE — SPLINT INTRANASAL POSISEP .6X2

## (undated) DEVICE — SPONGE PATTY SURGICAL .5X3IN

## (undated) DEVICE — PAD EYE STERILE 1-5/8X2-5/8IN

## (undated) DEVICE — BLADE QUADCUT STRAIGHT 4.3MM

## (undated) DEVICE — UNDERGLOVES BIOGEL PI SIZE 8

## (undated) DEVICE — TOWEL OR DISP STRL BLUE 4/PK

## (undated) DEVICE — GUIDE WIRE WHOLEY EXCHANGE 300

## (undated) DEVICE — TRACKER ENT INSTRUMENT

## (undated) DEVICE — TAPE CURAD PAPER ADH 1INX10YD

## (undated) DEVICE — CONTAINER SPECIMEN STRL 4OZ

## (undated) DEVICE — SUCTION COAGULATOR 10FR 6IN

## (undated) DEVICE — TOWEL OR XRAY BLUE 17X26IN

## (undated) DEVICE — SOL 9P NACL IRR PIC IL

## (undated) DEVICE — GLOVE BIOGEL ECLIPSE SZ 6.5

## (undated) DEVICE — SHEATH INTRODUCER 4FR 11CM

## (undated) DEVICE — ELECTRODE REM PLYHSV RETURN 9

## (undated) DEVICE — POWDER ARISTA AH 3G

## (undated) DEVICE — SYR 50CC LL

## (undated) DEVICE — SPIKE CONTRAST CONTROLLER

## (undated) DEVICE — SHEATH CLN ENDOSCRB 4MM

## (undated) DEVICE — SOL BETADINE 5%

## (undated) DEVICE — SYR PURAGEL 3ML

## (undated) DEVICE — TRACKER PATIENT NON INVASIVE

## (undated) DEVICE — GUIDEWIRE SION BLUE STR 190CM

## (undated) DEVICE — WAND TURBINATOR COBLATION

## (undated) DEVICE — Device

## (undated) DEVICE — PROTECTION STATION PLUS

## (undated) DEVICE — DRAPE THREE-QTR REINF 53X77IN

## (undated) DEVICE — WAND COBLATION XTRA EVAC 70

## (undated) DEVICE — COVER MAYO STND XL 30X57IN

## (undated) DEVICE — TRANSDUCER ADULT DISP

## (undated) DEVICE — TRAY CATH LAB OMC

## (undated) DEVICE — DRAPE CORETEMP FLD WRM 56X62IN

## (undated) DEVICE — CATH EMPULSE ANGLED 5FR PIGTAI

## (undated) DEVICE — ADAPTER PURASTAT SYRINGE

## (undated) DEVICE — SUT 4/0 18IN ETHILON BL P3

## (undated) DEVICE — CONTAINER SPECIMEN OR STER 4OZ

## (undated) DEVICE — SEE MEDLINE ITEM 157187

## (undated) DEVICE — GLASSES EYE PROTECTIVE

## (undated) DEVICE — PACK UPPER EXTREMITY BAPTIST

## (undated) DEVICE — KIT CO-PILOT

## (undated) DEVICE — KIT MICROINTRO 4F .018X40X7CM

## (undated) DEVICE — CATH NC QUANTUM APEX MR 3X12

## (undated) DEVICE — SHEATH INTRODUCER 5F 10CM

## (undated) DEVICE — CATH ARI 4FR

## (undated) DEVICE — SEE MEDLINE ITEM 146308

## (undated) DEVICE — GLOVE BIOGEL SKINSENSE PI 7.5

## (undated) DEVICE — SEE MEDLINE ITEM 156894

## (undated) DEVICE — ELECTRODE MEGADYNE RETURN DUAL

## (undated) DEVICE — HEMOSTAT VASC BAND REG 24CM

## (undated) DEVICE — KIT SINUS OMC

## (undated) DEVICE — SEE MEDLINE ITEM 146270

## (undated) DEVICE — SYR B-D DISP CONTROL 10CC100/C

## (undated) DEVICE — ELECTRODE NEEDLE 1IN

## (undated) DEVICE — SPLINT FIBERGLASS PAD 4X15

## (undated) DEVICE — CATH BLLN FG APEX MR 2.50X12MM

## (undated) DEVICE — SEE MEDLINE ITEM 152529

## (undated) DEVICE — SOL WATER STRL IRR 1000ML

## (undated) DEVICE — GUIDEWIRE EMERALD 150CM PTFE

## (undated) DEVICE — BUCKET PLASTER DISPOSABLE

## (undated) DEVICE — CATH NC QUANTUM APEX MR 3.5X8

## (undated) DEVICE — OMNIPAQUE 350 200ML

## (undated) DEVICE — KIT GLIDESHEATH SLEND 6FR 10CM

## (undated) DEVICE — PAD CAST SPECIALIST STRL 4

## (undated) DEVICE — DRESSING XEROFORM FOIL PK 1X8

## (undated) DEVICE — CATH ALII 4FR INFINITY

## (undated) DEVICE — TOURNIQUET SB QC DP 18X4IN

## (undated) DEVICE — GUIDEWIRE STD .035X180CM ANG

## (undated) DEVICE — DRAPE OPHTHALMIC 48X62 FEN

## (undated) DEVICE — KIT SURGIFLO HEMOSTATIC MATRIX

## (undated) DEVICE — KIT GREY EYE

## (undated) DEVICE — GUIDE WIRE BMW 014 X190

## (undated) DEVICE — CATH IMPULSE 5F 100CM FR4

## (undated) DEVICE — SHIELD EYE CLEAR ACRYLIC UNIV

## (undated) DEVICE — INFLATOR ENCORE 26 BLLN INFL

## (undated) DEVICE — PAD DEFIB CADENCE ADULT R2

## (undated) DEVICE — GLOVE BIOGEL ECLIPSE SZ 8

## (undated) DEVICE — BANDAGE DERMACEA STRETCH 4X1IN

## (undated) DEVICE — POINTER AXIEM CRANIAL TRACER

## (undated) DEVICE — GUIDEWIRE SUPRA CORE 035 190CM

## (undated) DEVICE — KIT CUSTOM MANIFOLD

## (undated) DEVICE — SHEATH LENS CLN 4MM 0D A70940A

## (undated) DEVICE — BANDAGE GAUZE 6PLY FLUFF 2X3

## (undated) DEVICE — CATH IMPULSE FL4 5FR 100CM

## (undated) DEVICE — CATH IMA INFINITI 4FRX100CM

## (undated) DEVICE — SHEATH INTRODUCER 6FR 11CM

## (undated) DEVICE — PAD UNDERPAD 30X30

## (undated) DEVICE — PACKING NASOPORE NASAL STD 8CM

## (undated) DEVICE — GUIDE LAUNCHER 6FR EBU 3.5

## (undated) DEVICE — GOWN SURGICAL X-LARGE

## (undated) DEVICE — CATH EAGLE EYE PLATINUM

## (undated) DEVICE — DRAPE STERI-DRAPE 1000 17X11IN

## (undated) DEVICE — APPLICATOR CHLORAPREP ORN 26ML

## (undated) DEVICE — CATH GUIDE LINER  V3 6F

## (undated) DEVICE — NDL FLTR 5MCRN BLNT TIP 18GX1

## (undated) DEVICE — DRESSING NASOPORE FD 8CM

## (undated) DEVICE — SET BLOOD ADMIN MACROBRE CLVE

## (undated) DEVICE — CATH BLLN FG APEX MR 2.50X8MM

## (undated) DEVICE — SHIELD FOX W/GARTER

## (undated) DEVICE — CATH INFINITI 4F JL4 .042X100

## (undated) DEVICE — NDL HYPO REG 25G X 1 1/2